# Patient Record
Sex: FEMALE | Race: BLACK OR AFRICAN AMERICAN | NOT HISPANIC OR LATINO | Employment: UNEMPLOYED | URBAN - METROPOLITAN AREA
[De-identification: names, ages, dates, MRNs, and addresses within clinical notes are randomized per-mention and may not be internally consistent; named-entity substitution may affect disease eponyms.]

---

## 2017-01-03 ENCOUNTER — OFFICE VISIT (OUTPATIENT)
Dept: SURGERY | Facility: CLINIC | Age: 52
End: 2017-01-03
Payer: MEDICAID

## 2017-01-03 VITALS
SYSTOLIC BLOOD PRESSURE: 143 MMHG | WEIGHT: 240 LBS | DIASTOLIC BLOOD PRESSURE: 73 MMHG | BODY MASS INDEX: 37.67 KG/M2 | HEART RATE: 99 BPM | TEMPERATURE: 98 F | HEIGHT: 67 IN

## 2017-01-03 DIAGNOSIS — R10.13 EPIGASTRIC PAIN: Primary | ICD-10-CM

## 2017-01-03 PROCEDURE — 99024 POSTOP FOLLOW-UP VISIT: CPT | Mod: ,,, | Performed by: PHYSICIAN ASSISTANT

## 2017-01-03 PROCEDURE — 99999 PR PBB SHADOW E&M-EST. PATIENT-LVL III: CPT | Mod: PBBFAC,,, | Performed by: PHYSICIAN ASSISTANT

## 2017-01-03 PROCEDURE — 99213 OFFICE O/P EST LOW 20 MIN: CPT | Mod: PBBFAC | Performed by: PHYSICIAN ASSISTANT

## 2017-01-03 NOTE — PROGRESS NOTES
Subjective:       Patient ID: Edita Riley is a 51 y.o. female.    Chief Complaint: Post Op Lap Nena    HPI Comments: Ms. Riley is well known to Dr. Goldberg's service, s/p lap nena 11/9/16. She is here today for abdominal pain which has been present off and on since surgery. She states the pain is in the epigastric area and occurs mostly at night. She initially does not recall which medicine she takes for it, but then admits that she takes nexium for the pain and it does relieve her symptoms. She denies vomiting, and constipation. She has diarrhea depending on what she eats. Her abdominal pain is exacerbated by specific foods. Her  gives examples such as juices and tomato sauce. She reports to sleeping on multiple pillows at night. She has been worked up by GI prior to lap nena and was found to have Schatzki ring and hiatal hernia.    Review of Systems   Constitutional: Negative for chills and fever.   HENT: Negative for congestion and rhinorrhea.    Eyes: Negative for photophobia and visual disturbance.   Respiratory: Negative for cough and shortness of breath.    Cardiovascular: Negative for chest pain and palpitations.   Gastrointestinal: Positive for abdominal pain and diarrhea. Negative for nausea and vomiting.   Endocrine: Negative for cold intolerance and heat intolerance.   Musculoskeletal: Negative for arthralgias and myalgias.   Skin: Negative for rash and wound.   Allergic/Immunologic: Negative for immunocompromised state.   Neurological: Negative for tremors and weakness.   Hematological: Negative for adenopathy.   Psychiatric/Behavioral: Negative for agitation.       Objective:      Physical Exam   Constitutional: She is oriented to person, place, and time. She appears well-developed and well-nourished. No distress.   HENT:   Head: Normocephalic and atraumatic.   Eyes: Pupils are equal, round, and reactive to light.   Neck: Normal range of motion. Neck supple.    Cardiovascular: Normal rate and regular rhythm.    Pulmonary/Chest: Effort normal. No respiratory distress.   Abdominal: Soft. She exhibits no distension. There is no tenderness.   Musculoskeletal: Normal range of motion. She exhibits no edema or tenderness.   Neurological: She is alert and oriented to person, place, and time.   Skin: Skin is warm and dry.   Psychiatric: She has a normal mood and affect.       Assessment:   50 yo female s/p lap nena 11/9/16; with abdominal pain  Plan:   Recommend patient to follow back up with GI regarding hiatal and schatzki ring  If surgical intervention is necessary will refer to [facility] or Srir[facility] for hiatal hernia repair

## 2017-01-30 ENCOUNTER — TELEPHONE (OUTPATIENT)
Dept: GASTROENTEROLOGY | Facility: CLINIC | Age: 52
End: 2017-01-30

## 2017-01-31 ENCOUNTER — OFFICE VISIT (OUTPATIENT)
Dept: GASTROENTEROLOGY | Facility: CLINIC | Age: 52
End: 2017-01-31
Payer: MEDICAID

## 2017-01-31 VITALS
HEIGHT: 67 IN | DIASTOLIC BLOOD PRESSURE: 82 MMHG | HEART RATE: 103 BPM | WEIGHT: 234.81 LBS | BODY MASS INDEX: 36.85 KG/M2 | SYSTOLIC BLOOD PRESSURE: 122 MMHG

## 2017-01-31 DIAGNOSIS — R10.13 EPIGASTRIC ABDOMINAL PAIN: Primary | ICD-10-CM

## 2017-01-31 PROCEDURE — 99213 OFFICE O/P EST LOW 20 MIN: CPT | Mod: PBBFAC | Performed by: INTERNAL MEDICINE

## 2017-01-31 PROCEDURE — 99999 PR PBB SHADOW E&M-EST. PATIENT-LVL III: CPT | Mod: PBBFAC,,, | Performed by: INTERNAL MEDICINE

## 2017-01-31 PROCEDURE — 99214 OFFICE O/P EST MOD 30 MIN: CPT | Mod: S$PBB,,, | Performed by: INTERNAL MEDICINE

## 2017-01-31 RX ORDER — SUCRALFATE 1 G/1
1 TABLET ORAL 2 TIMES DAILY PRN
Qty: 60 TABLET | Refills: 1 | Status: SHIPPED | OUTPATIENT
Start: 2017-01-31 | End: 2017-04-07 | Stop reason: SDUPTHER

## 2017-01-31 NOTE — PROGRESS NOTES
REASON FOR VISIT:  Lower chest and epigastric abdominal pain.    HISTORY OF PRESENT ILLNESS:  Ms. Riley is a 52-year-old who was previously   seen by me in September for upper abdominal pain and she subsequently underwent   an upper endoscopy and colonoscopy, which revealed a Schatzki's ring and a   medium-sized hiatal hernia.  Biopsies for H. pylori were negative.  A   colonoscopy was rather unremarkable except for a 3-mm colon polyp that was   completely resected and found to be a hyperplastic polyp.  Subsequent imaging   revealed cholelithiasis without cholecystitis; however, the patient underwent   cholecystectomy because of continued complaints of epigastric pain.  She did   well and currently is complaining of retrosternal lower chest pain radiating   into her epigastric region, which is usually triggered by food, especially   tomato gravy or spicy foods.  She is currently on Nexium daily and we talked   about adding Carafate suspension on an as needed basis, but also we discussed   about checking her labs today to evaluate for liver function tests and lipase.    Denies any alcohol use.    PAST MEDICAL, SURGICAL, SOCIAL AND FAMILY HISTORY:  Reviewed.    MEDICATIONS AND ALLERGIES:  Reviewed.    REVIEW OF SYSTEMS:  CONSTITUTIONAL:  No fever.  No chills.  No malaise.  No fatigue.  EYES:  No visual changes.  ENT:  No odynophagia or hoarseness of voice.  CARDIOVASCULAR:  No angina or palpitations.  RESPIRATORY:  No shortness of breath or wheezing.  GASTROINTESTINAL:  See HPI.  She is also complaining of some occasional   straining with her bowels.  No blood in the stool.    PHYSICAL EXAMINATION:  VITAL SIGNS:  See EPIC.  GENERAL:  Awake, alert and oriented x3, in no acute distress.  NECK:  Supple.  No carotid bruit.  ABDOMEN:  Obese and soft.  Mild tenderness to deep palpation in the epigastrium.    No guarding or rigidity.  Bowel sounds are normal.  No abdominal bruits heard.  EYES:  Conjunctivae are  anicteric.  CARDIOVASCULAR:  S1 and S2 are normal.  RESPIRATORY:  Bilateral air entry equal.  LOWER EXTREMITY:  No pedal edema.    IMPRESSION:  Epigastric abdominal pain -- etiology unclear; however, GERD could   be playing a role.    RECOMMENDATIONS:  1.  Continue Nexium daily.  2.  Add Carafate 1 g suspension p.o. b.i.d. p.r.n.  3.  Check CMP and lipase.  4.  May consider a repeat ultrasound of the right upper quadrant in the future   if the pain continues to evaluate for choledocholithiasis.    A total of 25 minutes were spent with the patient, more than 50% in counseling.      ACT/HN  dd: 01/31/2017 11:14:54 (CST)  td: 02/01/2017 04:15:27 (CST)  Doc ID   #[id number]  Job ID #[id number]    CC:

## 2017-01-31 NOTE — MR AVS SNAPSHOT
[facility]  [address]  [facility] [address]  Phone: [phone]  Fax: [phone]                  Edita Riley   2017 11:00 AM   Office Visit    Description:  Female : 1965   Provider:  Arden Gutiérrez MD   Department:  [facility]           Reason for Visit     Follow-up           Diagnoses this Visit        Comments    Epigastric abdominal pain    -  Primary            To Do List           Future Appointments        Provider Department Dept Phone    2017 11:40 AM LAB, APPOINTMENT [place] [facility] [phone]    3/2/2017 10:00 AM Hammad Lackey MD [facility] [phone]      Goals (5 Years of Data)     None       These Medications        Disp Refills Start End    sucralfate (CARAFATE) 1 gram tablet 60 tablet 1 2017     Take 1 tablet (1 g total) by mouth 2 (two) times daily as needed. - Oral    Pharmacy: [facility]/pharmacy #[id number] - [place], [place] - [address]. Ph #: [phone]         [facility]     [facility] Care [facility] -  Assistance  Registered nurses in the [facility] provide clinical advisement, health education, appointment booking, and other advisory services.  Call for this free service at [phone].             Medications           Message regarding Medications     Verify the changes and/or additions to your medication regime listed below are the same as discussed with your clinician today.  If any of these changes or additions are incorrect, please notify your healthcare provider.        START taking these NEW medications        Refills    sucralfate (CARAFATE) 1 gram tablet 1    Sig: Take 1 tablet (1 g total) by mouth 2 (two) times daily as needed.    Class: Normal    Route: Oral           Verify that the below list of medications is an accurate representation of the medications you are currently taking.  If none reported,  "the list may be blank. If incorrect, please contact your healthcare provider. Carry this list with you in case of emergency.           Current Medications     duloxetine (CYMBALTA) 60 MG capsule Take 1 capsule (60 mg total) by mouth once daily.    esomeprazole (NEXIUM) 40 MG capsule Take 1 capsule (40 mg total) by mouth before breakfast.    estradiol (ESTRACE) 0.01 % (0.1 mg/gram) vaginal cream Place 1 g vaginally once daily.    estradiol (ESTRACE) 1 MG tablet Take 1 tablet (1 mg total) by mouth once daily.    lisinopril (PRINIVIL,ZESTRIL) 5 MG tablet Take 1 tablet (5 mg total) by mouth once daily.    meclizine (ANTIVERT) 25 mg tablet Take 1 tablet (25 mg total) by mouth 3 (three) times daily as needed for Dizziness.    sucralfate (CARAFATE) 1 gram tablet Take 1 tablet (1 g total) by mouth 2 (two) times daily as needed.           Clinical Reference Information           Vital Signs - Last Recorded  Most recent update: 1/31/2017 10:55 AM by Mohan Short MA    BP Pulse Ht Wt BMI    122/82 103 5' 7" (1.702 m) 106.5 kg (234 lb 12.6 oz) 36.77 kg/m2      Blood Pressure          Most Recent Value    BP  122/82      Allergies as of 1/31/2017     Bee Sting [Allergen Ext-venom-honey Bee]    Grass Pollen-bermuda, Standard      Immunizations Administered on Date of Encounter - 1/31/2017     None      Orders Placed During Today's Visit     Future Labs/Procedures Expected by Expires    Comprehensive metabolic panel  1/31/2017 4/1/2018    Lipase  1/31/2017 4/1/2018      [facility] Sign-Up     Activating your [facility] account is as easy as 1-2-3!     1) Visit [email or web address], select Sign Up Now, enter this activation code and your date of birth, then select Next.  [email or web address]  Expires: 3/17/2017 11:11 AM      2) Create a username and password to use when you visit [facility] in the future and select a security question in case you lose your password and select Next.    3) Enter your e-mail address and click Sign " Up!    Additional Information  If you have questions, please e-mail [email or web address] or call [phone] to talk to our [facility] staff. Remember, [facility] is NOT to be used for urgent needs. For medical emergencies, dial 911.

## 2017-02-01 ENCOUNTER — TELEPHONE (OUTPATIENT)
Dept: GASTROENTEROLOGY | Facility: CLINIC | Age: 52
End: 2017-02-01

## 2017-02-01 NOTE — TELEPHONE ENCOUNTER
----- Message from Arden Gutiérrez MD sent at 2/1/2017  8:23 AM CST -----  Labs are stable. Follow up in 6 weeks.

## 2017-02-07 DIAGNOSIS — R23.2 HOT FLASHES: ICD-10-CM

## 2017-02-07 RX ORDER — ESTRADIOL 1 MG/1
TABLET ORAL
Qty: 30 TABLET | Refills: 2 | Status: SHIPPED | OUTPATIENT
Start: 2017-02-07 | End: 2017-04-06 | Stop reason: SDUPTHER

## 2017-03-01 DIAGNOSIS — I10 ESSENTIAL HYPERTENSION: ICD-10-CM

## 2017-03-02 NOTE — TELEPHONE ENCOUNTER
Refill requested of omeprazole but pt's med list has omeprazole. Please clarify which she is taking. Thanks!

## 2017-03-02 NOTE — TELEPHONE ENCOUNTER
[place], Refill requested of omeprazole but pt's med list has esomeprazole. Please clarify which she is taking. Thanks!

## 2017-03-03 RX ORDER — OMEPRAZOLE 40 MG/1
CAPSULE, DELAYED RELEASE ORAL
Qty: 30 CAPSULE | Refills: 0 | Status: SHIPPED | OUTPATIENT
Start: 2017-03-03 | End: 2017-03-30 | Stop reason: SDUPTHER

## 2017-03-03 RX ORDER — LISINOPRIL 5 MG/1
TABLET ORAL
Qty: 30 TABLET | Refills: 0 | Status: SHIPPED | OUTPATIENT
Start: 2017-03-03 | End: 2017-03-30 | Stop reason: SDUPTHER

## 2017-03-03 NOTE — TELEPHONE ENCOUNTER
Refill requested of omeprazole but pt's med list has esomeprazole. Please clarify which she is taking. Thanks!

## 2017-03-28 ENCOUNTER — OFFICE VISIT (OUTPATIENT)
Dept: INTERNAL MEDICINE | Facility: CLINIC | Age: 52
End: 2017-03-28
Payer: MEDICAID

## 2017-03-28 ENCOUNTER — OFFICE VISIT (OUTPATIENT)
Dept: GASTROENTEROLOGY | Facility: CLINIC | Age: 52
End: 2017-03-28
Payer: MEDICAID

## 2017-03-28 VITALS
SYSTOLIC BLOOD PRESSURE: 130 MMHG | WEIGHT: 238.31 LBS | HEIGHT: 67 IN | DIASTOLIC BLOOD PRESSURE: 84 MMHG | BODY MASS INDEX: 37.4 KG/M2 | HEART RATE: 78 BPM

## 2017-03-28 VITALS
WEIGHT: 238.13 LBS | BODY MASS INDEX: 37.37 KG/M2 | DIASTOLIC BLOOD PRESSURE: 80 MMHG | SYSTOLIC BLOOD PRESSURE: 137 MMHG | HEART RATE: 91 BPM | HEIGHT: 67 IN

## 2017-03-28 DIAGNOSIS — I10 ESSENTIAL HYPERTENSION: ICD-10-CM

## 2017-03-28 DIAGNOSIS — M79.7 FIBROMYALGIA: ICD-10-CM

## 2017-03-28 DIAGNOSIS — R23.2 HOT FLASHES: ICD-10-CM

## 2017-03-28 DIAGNOSIS — Z23 NEED FOR TDAP VACCINATION: ICD-10-CM

## 2017-03-28 DIAGNOSIS — G47.9 SLEEP TROUBLE: ICD-10-CM

## 2017-03-28 DIAGNOSIS — K21.9 GASTROESOPHAGEAL REFLUX DISEASE, ESOPHAGITIS PRESENCE NOT SPECIFIED: Primary | ICD-10-CM

## 2017-03-28 DIAGNOSIS — K44.9 HIATAL HERNIA: ICD-10-CM

## 2017-03-28 DIAGNOSIS — Z00.00 ANNUAL PHYSICAL EXAM: Primary | ICD-10-CM

## 2017-03-28 DIAGNOSIS — R42 VERTIGO: ICD-10-CM

## 2017-03-28 PROCEDURE — 99213 OFFICE O/P EST LOW 20 MIN: CPT | Mod: S$PBB,,, | Performed by: INTERNAL MEDICINE

## 2017-03-28 PROCEDURE — 99213 OFFICE O/P EST LOW 20 MIN: CPT | Mod: PBBFAC | Performed by: INTERNAL MEDICINE

## 2017-03-28 PROCEDURE — 99999 PR PBB SHADOW E&M-EST. PATIENT-LVL III: CPT | Mod: PBBFAC,,, | Performed by: INTERNAL MEDICINE

## 2017-03-28 PROCEDURE — 99396 PREV VISIT EST AGE 40-64: CPT | Mod: S$PBB,,, | Performed by: INTERNAL MEDICINE

## 2017-03-28 RX ORDER — CYCLOBENZAPRINE HCL 10 MG
TABLET ORAL
Refills: 1 | COMMUNITY
Start: 2017-03-23 | End: 2020-03-12

## 2017-03-28 RX ORDER — MECLIZINE HYDROCHLORIDE 25 MG/1
25 TABLET ORAL 3 TIMES DAILY PRN
Qty: 30 TABLET | Refills: 6 | Status: SHIPPED | OUTPATIENT
Start: 2017-03-28 | End: 2017-11-16 | Stop reason: SDUPTHER

## 2017-03-28 NOTE — PROGRESS NOTES
Subjective:       Patient ID: Edita Riley is a 52 y.o. female.    Chief Complaint: Follow-up    HPI    Last visit with me 11/2016. Referred to [facility] at that time but no visits made. Seen by Gastroenterology and General Surgery, had cholecystectomy. Has appointment with Gastroenterology today.     Reports feeling better since cholecystectomy but still with occasionally abdominal pain, thinks due to hiatal hernia. Taking blood pressure meds, sees Gastroenterology later today. Going to be seeing Neurology for dropping things out of her hand. Reports legs and neck are also causing problems. Has arthralgias in legs, knees feel stiff, reports osteoarthritis. Reports whole leg hurts.    Never had sleep study. Reports when getting up in morning has bad sweats.    Reviewed PMH, PSH, SH, FH, allergies, and medications.     Review of Systems   All other systems reviewed and are negative.      Objective:      Physical Exam   Constitutional: She is oriented to person, place, and time. No distress.   -American woman whose Body mass index is 37.33 kg/(m^2).    HENT:   Head: Atraumatic.   Right Ear: Tympanic membrane normal.   Left Ear: Tympanic membrane normal.   Mouth/Throat: Oropharynx is clear and moist. No oropharyngeal exudate.   Eyes: Pupils are equal, round, and reactive to light. Right eye exhibits no discharge. Left eye exhibits no discharge.   Neck: Normal range of motion. No thyromegaly present.   Cardiovascular: Normal rate, regular rhythm and normal heart sounds.    Pulmonary/Chest: Effort normal and breath sounds normal. No stridor. She has no wheezes. She has no rales.   Abdominal: Soft. She exhibits no distension and no mass. There is no hepatosplenomegaly. There is no tenderness. There is no guarding and negative Kendrick's sign.   Musculoskeletal: She exhibits tenderness (to deep palpation throughout b/L leg muscles). She exhibits no edema.   Lymphadenopathy:     She has no cervical adenopathy.  "  Neurological: She is alert and oriented to person, place, and time.   left sided facial palsy   Skin: Skin is warm and dry. No rash noted.   Psychiatric: She has a normal mood and affect. Her behavior is normal.   Nursing note and vitals reviewed.      Vitals:    03/28/17 1056 03/28/17 1123   BP: (!) 144/88 130/84   BP Location: Right arm Right arm   Patient Position: Sitting Sitting   BP Method: Manual Manual   Pulse: 78    Weight: 108.1 kg (238 lb 5.1 oz)    Height: 5' 7" (1.702 m)      Body mass index is 37.33 kg/(m^2).    RESULTS: Reviewed labs from last 3 months    I have personally checked the blood pressure and documented my findings.     Assessment:       1. Annual physical exam    2. Vertigo    3. Need for Tdap vaccination    4. Hiatal hernia    5. Essential hypertension    6. Fibromyalgia    7. Sleep trouble    8. Hot flashes        Plan:   Edita was seen today for follow-up.    Diagnoses and all orders for this visit:    Annual physical exam:  Age-appropriate health screening reviewed, indicated tests ordered.     Vertigo:  Prior diagnosis, well controlled on current management, uses Antivert PRN. No changes at this time, will continue to monitor.   -     meclizine (ANTIVERT) 25 mg tablet; Take 1 tablet (25 mg total) by mouth 3 (three) times daily as needed for Dizziness.    Need for Tdap vaccination  -     Tdap Vaccine    Hiatal hernia:  Continue follow up with Gastroenterology for evaluation and management.    Essential hypertension:  Prior diagnosis, well controlled on current management. No changes at this time, will continue to monitor.     Fibromyalgia:  Continue duloxetine and stretching exercise, refer to sleep clinic.  -     Ambulatory consult for Sleep Study    Sleep trouble:  Reports having trouble with sleeping, given obesity, HTN, and fibromyalgia refer to Sleep Clinic.  -     Ambulatory consult for Sleep Study    Hot flashes:  Mostly occurring at night, had BSO 2 yrs ago, takes estrogen " daily. No weight loss or other symptoms to suggest malignancy or chronic infection causing sweats.    Return in about 6 months (around 9/28/2017).  Hammad Lackey MD  Internal Medicine    Portions of this note were completed using [employer]oon[employer] dictation software. Please excuse typographical or syntax errors.

## 2017-03-28 NOTE — PROGRESS NOTES
REASON FOR VISIT:  Epigastric abdominal pain.    HISTORY OF PRESENT ILLNESS:  Ms. Riley was last seen in January with lower   chest and epigastric pain.  She was already on Nexium and had previously   undergone an upper endoscopy where there was evidence of mild Schatzki's ring   and was dilated up to 20 mm.  Biopsies for H. pylori were negative.  She   subsequently underwent cholecystectomy with evidence of chronic cholecystitis   and cholelithiasis.  She was on Carafate suspension during the last clinic visit   and she has been taking it on an as needed basis with good relief of her   symptoms.  We had checked her CMP and lipase, which were unremarkable.  Today,   she returns with no new complaints.  Overall, she is feeling much better.  We   discussed about continuing Nexium daily and using Carafate on a p.r.n. basis.    She will call back and set up a clinic appointment sooner than a year if she has   worsening of her symptoms.    PAST MEDICAL, SURGICAL, SOCIAL AND FAMILY HISTORY:  Reviewed.    MEDICATIONS AND ALLERGIES:  Reviewed.    REVIEW OF SYSTEMS:  CONSTITUTIONAL:  No fever.  No chills.  No unintentional weight loss.  Appetite   is stable.  EYES:  No visual changes.  ENT:  No odynophagia or hoarseness of voice.  CARDIOVASCULAR:  No angina or palpitations.  RESPIRATORY:  No shortness of breath or wheezing.  GASTROINTESTINAL:  See HPI.    PHYSICAL EXAMINATION:  VITAL SIGNS:  See EPIC.  GENERAL:  Awake, alert and oriented x3, in no acute distress.  No physical exam was done today.  About 15 minutes were spent with the patient,   more than 50% in counseling regarding taking her Nexium daily and using Carafate   only on an as needed basis.    IMPRESSION:  GERD, symptoms well controlled.  No recent bout of epigastric   abdominal pain.    RECOMMENDATIONS:  1.  Continue Nexium daily and use Carafate suspension p.r.n.  2.  Follow up in GI Clinic in a year or sooner with any complaints.      ACT/HN  dd:  03/28/2017 16:25:51 (CDT)  td: 03/29/2017 15:46:10 (CDT)  Doc ID   #[id number]  Job ID #[id number]    CC:

## 2017-03-28 NOTE — MR AVS SNAPSHOT
[facility]  [address]  [facility] [address]  Phone: [phone]  Fax: [phone]                  Edita Riley   3/28/2017 11:00 AM   Office Visit    Description:  Female : 1965   Provider:  Hammad Lackey MD   Department:  [facility]           Reason for Visit     Follow-up           Diagnoses this Visit        Comments    Annual physical exam    -  Primary     Vertigo         Need for Tdap vaccination         Hiatal hernia         Essential hypertension         Fibromyalgia         Sleep trouble         Hot flashes                To Do List           Future Appointments        Provider Department Dept Phone    3/28/2017 4:00 PM Arden Gutiérrez MD [facility] - Gastroenterology [phone]      Goals (5 Years of Data)     None      Follow-Up and Disposition     Return in about 6 months (around 2017).       These Medications        Disp Refills Start End    meclizine (ANTIVERT) 25 mg tablet 30 tablet 6 3/28/2017     Take 1 tablet (25 mg total) by mouth 3 (three) times daily as needed for Dizziness. - Oral    Pharmacy: [facility] #[id number] - [place], [place] - [address].  #: [phone]         [facility]     [facility] On Call Nurse Care Line -  Assistance  Registered nurses in the [facility] On Call Center provide clinical advisement, health education, appointment booking, and other advisory services.  Call for this free service at [phone].             Medications           Message regarding Medications     Verify the changes and/or additions to your medication regime listed below are the same as discussed with your clinician today.  If any of these changes or additions are incorrect, please notify your healthcare provider.             Verify that the below list of medications is an accurate representation of the medications you are currently taking.  If none reported, the list may be blank. If incorrect,  "please contact your healthcare provider. Carry this list with you in case of emergency.           Current Medications     cyclobenzaprine (FLEXERIL) 10 MG tablet TAKE 1 TABLET (10 MG TOTAL) BY MOUTH NIGHTLY AS NEEDED FOR MUSCLE SPASMS.    duloxetine (CYMBALTA) 60 MG capsule Take 1 capsule (60 mg total) by mouth once daily.    estradiol (ESTRACE) 0.01 % (0.1 mg/gram) vaginal cream Place 1 g vaginally once daily.    estradiol (ESTRACE) 1 MG tablet TAKE ONE TABLET BY MOUTH DAILY    lisinopril (PRINIVIL,ZESTRIL) 5 MG tablet TAKE ONE TABLET BY MOUTH EVERY DAY FOR BLOOD PRESSURE    meclizine (ANTIVERT) 25 mg tablet Take 1 tablet (25 mg total) by mouth 3 (three) times daily as needed for Dizziness.    omeprazole (PRILOSEC) 40 MG capsule TAKE ONE CAPSULE BY MOUTH DAILY FOR STOMACH    sucralfate (CARAFATE) 1 gram tablet Take 1 tablet (1 g total) by mouth 2 (two) times daily as needed.           Clinical Reference Information           Your Vitals Were     BP Pulse Height Weight BMI    130/84 (BP Location: Right arm, Patient Position: Sitting, BP Method: Manual) 78 5' 7" (1.702 m) 108.1 kg (238 lb 5.1 oz) 37.33 kg/m2      Blood Pressure          Most Recent Value    BP  130/84      Allergies as of 3/28/2017     Bee Sting [Allergen Ext-venom-honey Bee]    Grass Pollen-bermuda, Standard      Immunizations Administered on Date of Encounter - 3/28/2017     Name Date Dose VIS Date Route    TDAP 3/28/2017 0.5 mL 2/24/2015 Intramuscular      Orders Placed During Today's Visit      Normal Orders This Visit    Ambulatory consult for Sleep Study     Tdap Vaccine       [facility] Sign-Up     Activating your [facility] account is as easy as 1-2-3!     1) Visit [email or web address], select Sign Up Now, enter this activation code and your date of birth, then select Next.  [email or web address]  Expires: 5/12/2017 11:46 AM      2) Create a username and password to use when you visit [facility] in the future and select a security question in case you " lose your password and select Next.    3) Enter your e-mail address and click Sign Up!    Additional Information  If you have questions, please e-mail [email or web address] or call [phone] to talk to our [facility] staff. Remember, [facility] is NOT to be used for urgent needs. For medical emergencies, dial 911.         Language Assistance Services     ATTENTION: Language assistance services are available, free of charge. Please call [phone].      ATENCIÓN: Si habla español, tiene a ocasio disposición servicios gratuitos de asistencia lingüística. Llame al [phone].     CHÚ Ý: N?u b?n nói Ti?ng Vi?t, có các d?ch v? h? tr? ngôn ng? mi?n phí dành cho b?n. G?i s? [phone].         [facility] complies with applicable Federal civil rights laws and does not discriminate on the basis of race, color, national origin, age, disability, or sex.

## 2017-03-28 NOTE — MR AVS SNAPSHOT
[facility]  [address]  [facility] [address]  Phone: [phone]  Fax: [phone]                  Edita Riley   3/28/2017 4:00 PM   Office Visit    Description:  Female : 1965   Provider:  Arden Gutiérrez MD   Department:  [facility]           Reason for Visit     Follow-up                To Do List           Future Appointments        Provider Department Dept Phone    2017 9:20 AM Marybeth Beaulieu MD [facility] [place], [facility] [phone]    2017 4:20 PM Refugio Benjamin MD [facility] [phone]      Goals (5 Years of Data)     None      [facility]     [facility] Line -  Assistance  Registered nurses in the [facility] provide clinical advisement, health education, appointment booking, and other advisory services.  Call for this free service at [phone].             Medications           Message regarding Medications     Verify the changes and/or additions to your medication regime listed below are the same as discussed with your clinician today.  If any of these changes or additions are incorrect, please notify your healthcare provider.             Verify that the below list of medications is an accurate representation of the medications you are currently taking.  If none reported, the list may be blank. If incorrect, please contact your healthcare provider. Carry this list with you in case of emergency.           Current Medications     cyclobenzaprine (FLEXERIL) 10 MG tablet TAKE 1 TABLET (10 MG TOTAL) BY MOUTH NIGHTLY AS NEEDED FOR MUSCLE SPASMS.    duloxetine (CYMBALTA) 60 MG capsule Take 1 capsule (60 mg total) by mouth once daily.    estradiol (ESTRACE) 0.01 % (0.1 mg/gram) vaginal cream Place 1 g vaginally once daily.    estradiol (ESTRACE) 1 MG tablet TAKE ONE TABLET BY MOUTH DAILY    lisinopril (PRINIVIL,ZESTRIL) 5 MG tablet TAKE ONE TABLET BY MOUTH  "EVERY DAY FOR BLOOD PRESSURE    meclizine (ANTIVERT) 25 mg tablet Take 1 tablet (25 mg total) by mouth 3 (three) times daily as needed for Dizziness.    omeprazole (PRILOSEC) 40 MG capsule TAKE ONE CAPSULE BY MOUTH DAILY FOR STOMACH    sucralfate (CARAFATE) 1 gram tablet Take 1 tablet (1 g total) by mouth 2 (two) times daily as needed.           Clinical Reference Information           Your Vitals Were     BP Pulse Height Weight BMI    137/80 91 5' 7" (1.702 m) 108 kg (238 lb 1.6 oz) 37.29 kg/m2      Blood Pressure          Most Recent Value    BP  137/80      Allergies as of 3/28/2017     Bee Sting [Allergen Ext-venom-honey Bee]    Grass Pollen-bermuda, Standard      Immunizations Administered on Date of Encounter - 3/28/2017     Name Date Dose VIS Date Route    TDAP 3/28/2017 0.5 mL 2/24/2015 Intramuscular      [facility] Sign-Up     Activating your [facility] account is as easy as 1-2-3!     1) Visit [email or web address], select Sign Up Now, enter this activation code and your date of birth, then select Next.  [email or web address]  Expires: 5/12/2017 11:46 AM      2) Create a username and password to use when you visit [facility] in the future and select a security question in case you lose your password and select Next.    3) Enter your e-mail address and click Sign Up!    Additional Information  If you have questions, please e-mail [email or web address] or call [phone] to talk to our [facility] staff. Remember, [facility] is NOT to be used for urgent needs. For medical emergencies, dial 911.         Language Assistance Services     ATTENTION: Language assistance services are available, free of charge. Please call [phone].      ATENCIÓN: Si habla johnyañol, tiene a ocasio disposición servicios gratuitos de asistencia lingüística. Llame al [phone].     CHÚ Ý: N?u b?n nói Ti?ng Vi?t, có các d?ch v? h? tr? ngôn ng? mi?n phí dành cho b?n. G?i s? [phone].         [facility] complies with " applicable Federal civil rights laws and does not discriminate on the basis of race, color, national origin, age, disability, or sex.

## 2017-03-30 DIAGNOSIS — I10 ESSENTIAL HYPERTENSION: ICD-10-CM

## 2017-03-30 RX ORDER — OMEPRAZOLE 40 MG/1
CAPSULE, DELAYED RELEASE ORAL
Qty: 30 CAPSULE | Refills: 11 | Status: SHIPPED | OUTPATIENT
Start: 2017-03-30 | End: 2017-11-16 | Stop reason: SDUPTHER

## 2017-03-30 RX ORDER — LISINOPRIL 5 MG/1
TABLET ORAL
Qty: 30 TABLET | Refills: 11 | Status: SHIPPED | OUTPATIENT
Start: 2017-03-30 | End: 2017-07-20 | Stop reason: DRUGHIGH

## 2017-04-06 DIAGNOSIS — R23.2 HOT FLASHES: ICD-10-CM

## 2017-04-06 RX ORDER — ESTRADIOL 1 MG/1
TABLET ORAL
Qty: 30 TABLET | Refills: 11 | Status: SHIPPED | OUTPATIENT
Start: 2017-04-06 | End: 2017-07-11

## 2017-04-07 RX ORDER — SUCRALFATE 1 G/1
TABLET ORAL
Qty: 60 TABLET | Refills: 1 | Status: SHIPPED | OUTPATIENT
Start: 2017-04-07 | End: 2017-06-02 | Stop reason: SDUPTHER

## 2017-05-18 ENCOUNTER — OFFICE VISIT (OUTPATIENT)
Dept: SLEEP MEDICINE | Facility: CLINIC | Age: 52
End: 2017-05-18
Payer: MEDICAID

## 2017-05-18 VITALS
HEART RATE: 96 BPM | BODY MASS INDEX: 37.89 KG/M2 | WEIGHT: 241.38 LBS | HEIGHT: 67 IN | SYSTOLIC BLOOD PRESSURE: 130 MMHG | DIASTOLIC BLOOD PRESSURE: 79 MMHG

## 2017-05-18 DIAGNOSIS — G47.30 SLEEP APNEA, UNSPECIFIED TYPE: Primary | ICD-10-CM

## 2017-05-18 PROCEDURE — 99999 PR PBB SHADOW E&M-EST. PATIENT-LVL III: CPT | Mod: PBBFAC,,, | Performed by: PSYCHIATRY & NEUROLOGY

## 2017-05-18 PROCEDURE — 99213 OFFICE O/P EST LOW 20 MIN: CPT | Mod: PBBFAC | Performed by: PSYCHIATRY & NEUROLOGY

## 2017-05-18 PROCEDURE — 99204 OFFICE O/P NEW MOD 45 MIN: CPT | Mod: S$PBB,,, | Performed by: PSYCHIATRY & NEUROLOGY

## 2017-05-18 NOTE — PATIENT INSTRUCTIONS
SLEEP LAB ([facility] or [facility]) will contact you to schedulethe sleep study. Their number is [phone] (ext 1). Please call them if you do not hear from them in 10 business days from now.  The [facility] is located on 7th floor of the [facility]; [place] [facility] is located in [facility].    SLEEP CLINIC (my assistant) will call you when the sleep study results are ready - if you have not heard from us by 2 weeks from the date of the study, please call [phone] (ext 2) or you can use [facility] to contact me.    You are advised to abstain from driving should you feel sleepy or drowsy.

## 2017-05-18 NOTE — PROGRESS NOTES
Edita Riley  was seen at the request of  Hammad Lackey MD for sleep evaluation.    05/18/2017 INITIAL HISTORY OF PRESENT ILLNESS:  Edita Riley is a 52 y.o. female is here to be evaluated for a sleep disorder.       CHIEF COMPLAINT:      The patient's complaints include excessive daytime sleepiness, excessive daytime fatigue, snoring,  witnessed breathing pauses,  gasping for air in sleep and interrupted sleep since 3 years ago.    Lifting head of the bed helped some - but still tired    Gained some weight prior the onset of her symptoms.    Reports  dry mouth and sore throat  Reports nasal congestion   Reports occasional  morning headaches  Reports  interrupted sleep  Denies frequent leg movements; but reports leg pain  Denies symptoms concerning for parasomnia    The ESS (Providence Sleepiness Score) taken on initial visit is 21 /24    The patient never had tonsillectomy, adenoidectomy or UPPP     At times feels lightheaded and week in her legs/drowsy when walking      SLEEP ROUTINE AND LIFESTYLE 05/18/2017 :    Occupation:    Bed partner:      Time to bed - wake up time on a workday : 10 to 9  Time to bed - wake up time on a day off: 12 to  10  Sleep onset latency: 1 hour  Disruptions or awakenings: 2-3  Time to fall back into sleep: 15 min   Perceived sleep quality: 3/5  Perceived total sleep time:  Sometimes 6 hrs  hours.  Daytime naps: 3    Exercise routine: no  Caffeine:  Coffee   Sometimes - 1 cup a day and later to stay awake     PREVIOUS SLEEP STUDIES:   NOne      DME:       PAST MEDICAL HISTORY:    Active Ambulatory Problems     Diagnosis Date Noted    Osteoarthritis of back 05/04/2015    Hiatal hernia 05/04/2015    Essential hypertension 05/04/2015    Lower extremity pain, diffuse 07/24/2015    Weakness of both lower extremities 07/24/2015    Impaired functional mobility, balance, gait, and endurance 07/24/2015    Schatzki's ring 09/14/2015    Colon polyp 09/14/2015     Internal hemorrhoids 09/14/2015    Cardiovascular event risk -- low 09/14/2015    Hemifacial spasm 09/16/2015    Depression 09/16/2015    Fibromyalgia 09/16/2015    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    Blood in stool 10/24/2016     Resolved Ambulatory Problems     Diagnosis Date Noted    Cervical cancer 05/04/2015    Biliary colic 11/09/2016     Past Medical History:   Diagnosis Date    Cervical cancer 2014    GERD (gastroesophageal reflux disease)     Hiatal hernia 2014    Hypertension     Lactose intolerance     Osteoarthritis of back     Stroke 1972                PAST SURGICAL HISTORY:    Past Surgical History:   Procedure Laterality Date    BILATERAL OOPHORECTOMY  2015    CHOLECYSTECTOMY  11/09/2016    [facility], path showed chronic cholecystitis and gallstones    COLONOSCOPY  2014    COLONOSCOPY N/A 10/24/2016    at [facility], Dr Thomas    ESOPHAGOGASTRODUODENOSCOPY  2014    HYSTERECTOMY  2015         FAMILY HISTORY:                Family History   Problem Relation Age of Onset    Heart disease Sister     Heart disease Maternal Grandmother     Colon cancer Father     Esophageal cancer Father     Cancer Father      Lung    Cancer Mother      Cervical    Rectal cancer Neg Hx     Stomach cancer Neg Hx     Crohn's disease Neg Hx     Ulcerative colitis Neg Hx        SOCIAL HISTORY:          Tobacco:   History   Smoking Status    Never Smoker   Smokeless Tobacco    Never Used       alcohol use:    History   Alcohol Use No                   ALLERGIES:    Review of patient's allergies indicates:   Allergen Reactions    Bee sting [allergen ext-venom-honey bee]      Rash      Grass pollen-bermuda, standard      rash       CURRENT MEDICATIONS:    Current Outpatient Prescriptions   Medication Sig Dispense Refill    cyclobenzaprine (FLEXERIL) 10 MG tablet TAKE 1 TABLET (10 MG TOTAL) BY MOUTH NIGHTLY AS NEEDED FOR MUSCLE SPASMS.  1    estradiol (ESTRACE) 1 MG tablet TAKE ONE TABLET BY  "MOUTH DAILY 30 tablet 11    lisinopril (PRINIVIL,ZESTRIL) 5 MG tablet TAKE ONE TABLET BY MOUTH EVERY DAY FOR BLOOD PRESSURE 30 tablet 11    meclizine (ANTIVERT) 25 mg tablet Take 1 tablet (25 mg total) by mouth 3 (three) times daily as needed for Dizziness. 30 tablet 6    omeprazole (PRILOSEC) 40 MG capsule TAKE ONE CAPSULE BY MOUTH DAILY FOR STOMACH 30 capsule 11    sucralfate (CARAFATE) 1 gram tablet TAKE 1 TABLET (1 G TOTAL) BY MOUTH 2 (TWO) TIMES DAILY AS NEEDED. 60 tablet 1    duloxetine (CYMBALTA) 60 MG capsule Take 1 capsule (60 mg total) by mouth once daily. 90 capsule 3     No current facility-administered medications for this visit.                       REVIEW OF SYSTEMS:   Sleep related symptoms as per HPI    reports weight gain 20 lbs up and down  Reports dyspnea  Reports occasional palpitations  Reports acid reflux - hiatal hernia  Reports polyuria  Reports  mood diturbance  Denies  anemia  Reports  muscle pain  Denies  Gait imbalance    Otherwise, a balance of 10 systems reviewed is negative.    PHYSICAL EXAM:  /79 (BP Location: Left arm, Patient Position: Sitting, BP Method: Automatic)  Pulse 96  Ht 5' 7" (1.702 m)  Wt 109.5 kg (241 lb 6.5 oz)  BMI 37.81 kg/m2  GENERAL: Overweight body habitus, well groomed.  HEENT:   HEENT:  Conjunctivae are non-erythematous; Pupils equal, round, and reactive to light; Nose is symmetrical; Nasal mucosa is pink and moist; Septum is midline; Inferior turbinates are normal; Nasal airflow is diminshed; Posterior pharynx is pink; Modified Mallampati:IV; Posterior palate is low; Tonsils not visualized; Uvula is wide and elongated;Tongue is enlarged; Dentition is fair; No TMJ tenderness; Jaw opening and protrusion without click and without discomfort.  NECK: Supple. Neck circumference is 16 inches. No thyromegaly. No palpable nodes.     SKIN: On face and neck: No abrasions, no rashes, no lesions.  No subcutaneous nodules are palpable.  RESPIRATORY: Chest is " "clear to auscultation.  Normal chest expansion and non-labored breathing at rest.  CARDIOVASCULAR: Normal S1, S2.  No murmurs, gallops or rubs. No carotid bruits bilaterally.  No edema. No clubbing. No cyanosis.    NEURO: Oriented to time, place and person. Normal attention span and concentration. Gait normal.    PSYCH: Affect is full. Mood is normal  MUSCULOSKELETAL: Moves 4 extremities. Gait normal.         Using [facility]: No      ASSESSMENT:    1. JENN (obstructive sleep apnea). The patient symptomatically has  excessive daytime sleepiness, snoring,  witnessed breathing pauses, excessive daytime fatigue, gasping for air in sleep and interrupted sleep  with exam findings of "a crowded oral airway and elevated body mass index. The patient has medical co-morbidities of hypertension, fibromyalgia and depression which can be worsened by JENN. This warrants further investigation for possible obstructive sleep apnea.    2. Lightheadedness, at times her legs would give away - I can not explain that by sleep apnea - related sleepiness; cataplexy is in the differential, but it is unusual to develop narcolepsy at this age.  Cardiovascular work up to be considered.       PLAN:    Diagnostic: Polysomnogram in lab. The nature of this procedure and its indication was discussed with the patient. she would  like to come discuss PSG results.    Weight loss strategies were discussed in detail     PSG in lab testing       More than 25 minutes of this 45 minutes visit was spent in counseling: during our discussion today, we talked about the etiology of  JENN as well as the potential ramifications of untreated sleep apnea, which could include daytime sleepiness, hypertension, heart disease and/or stroke.  We discussed potential treatment options, which could include weight loss, body positioning, continuous positive airway pressure (CPAP), or referral for surgical consideration. Meanwhile, she  is urged to avoid supine sleep, weight " gain and alcoholic beverages since all of these can worsen JENN.     Precautions: The patient was advised to abstain from driving should he feel sleepy or drowsy.    Follow up: MD/NP  after the sleep study has been completed.     Thank you for allowing me the opportunity to participate in the care of your patient.    This visit summary will be sent to referring provider via [employer]

## 2017-05-18 NOTE — LETTER
May 18, 2017      Hammad Lackey MD  [address]  [facility] [address]           [facility]  [address]  [facility] [address]  Phone: [phone]          Patient: Edita Riley   MR Number: [id number]   YOB: 1965   Date of Visit: 5/18/2017       Dear Dr. Hammad Lackey:    Thank you for referring Edita Riley to me for evaluation. Attached you will find relevant portions of my assessment and plan of care.    If you have questions, please do not hesitate to call me. I look forward to following Edita Riley along with you.    Sincerely,    Marybeth Beaulieu MD    Enclosure  CC:  No Recipients    If you would like to receive this communication electronically, please contact [email or web address] or [phone] to request more information on [employer] access.    For providers and/or their staff who would like to refer a patient to [facility], please contact us through our one-stop-shop provider referral line, [facility], at [phone].    If you feel you have received this communication in error or would no longer like to receive these types of communications, please e-mail [email or web address]

## 2017-05-18 NOTE — MR AVS SNAPSHOT
[facility]  [address]  [facility] [address]  Phone: [phone]                  Edita Riley   2017 9:20 AM   Office Visit    Description:  Female : 1965   Provider:  Marybeth Beaulieu MD   Department:  [facility]           Reason for Visit     Fatigue     Sleep Apnea           Diagnoses this Visit        Comments    Sleep apnea, unspecified type    -  Primary            To Do List           Future Appointments        Provider Department Dept Phone    2017 4:20 PM Refugio Benjamin MD [facility] [phone]      Goals (5 Years of Data)     None      [facility]     [facility] On Call Nurse Care Line -  Assistance  Unless otherwise directed by your provider, please contact [facility], our nurse care line that is available for  assistance.     Registered nurses in the [facility] Center provide: appointment scheduling, clinical advisement, health education, and other advisory services.  Call: [phone] (toll free)               Medications           Message regarding Medications     Verify the changes and/or additions to your medication regime listed below are the same as discussed with your clinician today.  If any of these changes or additions are incorrect, please notify your healthcare provider.             Verify that the below list of medications is an accurate representation of the medications you are currently taking.  If none reported, the list may be blank. If incorrect, please contact your healthcare provider. Carry this list with you in case of emergency.           Current Medications     cyclobenzaprine (FLEXERIL) 10 MG tablet TAKE 1 TABLET (10 MG TOTAL) BY MOUTH NIGHTLY AS NEEDED FOR MUSCLE SPASMS.    estradiol (ESTRACE) 1 MG tablet TAKE ONE TABLET BY MOUTH DAILY    lisinopril (PRINIVIL,ZESTRIL) 5 MG tablet TAKE ONE TABLET BY MOUTH EVERY DAY FOR BLOOD PRESSURE    meclizine  "(ANTIVERT) 25 mg tablet Take 1 tablet (25 mg total) by mouth 3 (three) times daily as needed for Dizziness.    omeprazole (PRILOSEC) 40 MG capsule TAKE ONE CAPSULE BY MOUTH DAILY FOR STOMACH    sucralfate (CARAFATE) 1 gram tablet TAKE 1 TABLET (1 G TOTAL) BY MOUTH 2 (TWO) TIMES DAILY AS NEEDED.    duloxetine (CYMBALTA) 60 MG capsule Take 1 capsule (60 mg total) by mouth once daily.           Clinical Reference Information           Your Vitals Were     BP Pulse Height Weight BMI    130/79 (BP Location: Left arm, Patient Position: Sitting, BP Method: Automatic) 96 5' 7" (1.702 m) 109.5 kg (241 lb 6.5 oz) 37.81 kg/m2      Blood Pressure          Most Recent Value    BP  130/79      Allergies as of 5/18/2017     Bee Sting [Allergen Ext-venom-honey Bee]    Grass Pollen-bermuda, Standard      Immunizations Administered on Date of Encounter - 5/18/2017     None      Orders Placed During Today's Visit     Future Labs/Procedures Expected by Expires    Polysomnogram (CPAP will be added if patient meets diagnostic criteria.)  As directed 5/18/2018      [facility] Sign-Up     Activating your [facility] account is as easy as 1-2-3!     1) Visit [email or web address], select Sign Up Now, enter this activation code and your date of birth, then select Next.  [email or web address]  Expires: 7/2/2017 10:40 AM      2) Create a username and password to use when you visit [facility] in the future and select a security question in case you lose your password and select Next.    3) Enter your e-mail address and click Sign Up!    Additional Information  If you have questions, please e-mail [email or web address] or call [phone] to talk to our [facility] staff. Remember, [facility] is NOT to be used for urgent needs. For medical emergencies, dial 911.         [facility]    SLEEP LAB ([facility] Good) will contact you to schedulethe sleep study. Their number is [phone] (ext 1). Please call them if you do not hear from them in 10 business days from " now.  The [facility] is located on 7th floor of the [facility]; [place] lab is located in [facility].    SLEEP CLINIC (my assistant) will call you when the sleep study results are ready - if you have not heard from us by 2 weeks from the date of the study, please call [phone] (ext 2) or you can use [facility] to contact me.    You are advised to abstain from driving should you feel sleepy or drowsy.         Language Assistance Services     ATTENTION: Language assistance services are available, free of charge. Please call [phone].      ATENCIÓN: Si habla español, tiene a ocasio disposición servicios gratuitos de asistencia lingüística. Llame al [phone].     CHÚ Ý: N?u b?n nói Ti?ng Vi?t, có các d?ch v? h? tr? ngôn ng? mi?n phí dành cho b?n. G?i s? [phone].         [facility] complies with applicable Federal civil rights laws and does not discriminate on the basis of race, color, national origin, age, disability, or sex.

## 2017-05-30 ENCOUNTER — HOSPITAL ENCOUNTER (OUTPATIENT)
Dept: SLEEP MEDICINE | Facility: OTHER | Age: 52
Discharge: HOME | End: 2017-05-30
Attending: PSYCHIATRY & NEUROLOGY
Payer: MEDICAID

## 2017-05-30 DIAGNOSIS — G47.20 SLEEP STAGE DYSFUNCTION: ICD-10-CM

## 2017-05-30 DIAGNOSIS — G47.30 SLEEP APNEA, UNSPECIFIED TYPE: ICD-10-CM

## 2017-05-30 PROCEDURE — 95810 POLYSOM 6/> YRS 4/> PARAM: CPT

## 2017-05-30 PROCEDURE — 95810 POLYSOM 6/> YRS 4/> PARAM: CPT | Mod: 26,,, | Performed by: PSYCHIATRY & NEUROLOGY

## 2017-05-31 NOTE — PROGRESS NOTES
Edita Riley was here for an overnight sleep study on 05/30/2017.  Pt education/cpap explanation given prior to study.    Pt did not meet Medicaid criteria for cpap. Some respiratory events are observed. Several did not meet  desat criteria.  Occasional soft snore noted. Poor sleep in early study.  Pt without complaint of distress or discomfort. Period of wake in later study- pt did report arthritic pain.  Pt reported this a typical night.    EKG Lead 2 appears with rare atrial couplet.  Low saturation 89%.    No technical difficulties to report.

## 2017-06-05 RX ORDER — SUCRALFATE 1 G/1
TABLET ORAL
Qty: 60 TABLET | Refills: 1 | Status: SHIPPED | OUTPATIENT
Start: 2017-06-05 | End: 2017-07-30 | Stop reason: SDUPTHER

## 2017-06-08 ENCOUNTER — TELEPHONE (OUTPATIENT)
Dept: SLEEP MEDICINE | Facility: CLINIC | Age: 52
End: 2017-06-08

## 2017-07-11 ENCOUNTER — OFFICE VISIT (OUTPATIENT)
Dept: OBSTETRICS AND GYNECOLOGY | Facility: CLINIC | Age: 52
End: 2017-07-11
Payer: MEDICAID

## 2017-07-11 VITALS
SYSTOLIC BLOOD PRESSURE: 126 MMHG | BODY MASS INDEX: 37.96 KG/M2 | HEIGHT: 67 IN | DIASTOLIC BLOOD PRESSURE: 86 MMHG | WEIGHT: 241.88 LBS

## 2017-07-11 DIAGNOSIS — Z12.4 PAP SMEAR FOR CERVICAL CANCER SCREENING: ICD-10-CM

## 2017-07-11 DIAGNOSIS — N95.2 VAGINAL ATROPHY: ICD-10-CM

## 2017-07-11 DIAGNOSIS — N89.8 VAGINAL DISCHARGE: Primary | ICD-10-CM

## 2017-07-11 DIAGNOSIS — R23.2 HOT FLASHES: ICD-10-CM

## 2017-07-11 PROCEDURE — 99213 OFFICE O/P EST LOW 20 MIN: CPT | Mod: S$PBB,,, | Performed by: OBSTETRICS & GYNECOLOGY

## 2017-07-11 PROCEDURE — 87480 CANDIDA DNA DIR PROBE: CPT

## 2017-07-11 PROCEDURE — 87660 TRICHOMONAS VAGIN DIR PROBE: CPT

## 2017-07-11 PROCEDURE — 99999 PR PBB SHADOW E&M-EST. PATIENT-LVL III: CPT | Mod: PBBFAC,,, | Performed by: OBSTETRICS & GYNECOLOGY

## 2017-07-11 PROCEDURE — 88142 CYTOPATH C/V THIN LAYER: CPT

## 2017-07-11 PROCEDURE — 99213 OFFICE O/P EST LOW 20 MIN: CPT | Mod: PBBFAC | Performed by: OBSTETRICS & GYNECOLOGY

## 2017-07-11 NOTE — PROGRESS NOTES
Gynecology    SUBJECTIVE:     Chief Complaint: Vaginal Discharge and Vaginal Itching       History of Present Illness:  52 year old who presents with two weeks of itching.  She reports some discharge as well with an odor. Also reports some burning.      Patient also reports a history of cervical cancer.  Reports that she had only her ovaries removed.  No radiation or chemotherapy? Ultrasound from 2016 here reveals absent uterus, tubes and ovaries.  Prior examinations from gyn's here also confirm.  Patient is unsure of actual history.  Surgery was done in [facility]- 2014. No records.     Review of Systems:  Review of Systems   Genitourinary: Positive for vaginal discharge and vaginal odor. Negative for pelvic pain, vaginal bleeding and vaginal pain.        OBJECTIVE:     Physical Exam:  Physical Exam   Constitutional: She is oriented to person, place, and time. She appears well-developed and well-nourished.   Pulmonary/Chest: Effort normal.   Abdominal: Soft.   Genitourinary: No labial fusion. There is no rash, tenderness, lesion or injury on the right labia. There is no rash, tenderness, lesion or injury on the left labia. No erythema, tenderness or bleeding in the vagina. No foreign body in the vagina. No signs of injury around the vagina. Vaginal discharge found.   Genitourinary Comments: Very narrow pelvis, vaginal atrophy, difficult exam;   Neurological: She is alert and oriented to person, place, and time.   Psychiatric: She has a normal mood and affect. Her behavior is normal. Judgment and thought content normal.   Nursing note and vitals reviewed.        ASSESSMENT:       ICD-10-CM ICD-9-CM    1. Vaginal discharge N89.8 623.5 Vaginosis Screen by DNA Probe   2. Hot flashes R23.2 782.62    3. Pap smear for cervical cancer screening Z12.4 V76.2 Liquid-based pap smear, screening   4. Vaginal atrophy N95.2 627.3 conjugated estrogens (PREMARIN) vaginal cream          Plan:      Edita was seen today for vaginal  discharge and vaginal itching.    Diagnoses and all orders for this visit:    Vaginal discharge  -     Vaginosis Screen by DNA Probe    Hot flashes    Pap smear for cervical cancer screening  -     Liquid-based pap smear, screening    Vaginal atrophy  -     conjugated estrogens (PREMARIN) vaginal cream; Place 0.5 g vaginally twice a week.    - vaginal estrogen sent to pharmacy; discussed use with patient  - pap smear today as patient reports cervical cancer was in 2014; last pap normal in 2015  - vaginosis screen for vaginal discharge  - will call/mail results to patient    Orders Placed This Encounter   Procedures    Vaginosis Screen by DNA Probe       Return for annual or prn.    Aisha Vega

## 2017-07-12 ENCOUNTER — TELEPHONE (OUTPATIENT)
Dept: OBSTETRICS AND GYNECOLOGY | Facility: CLINIC | Age: 52
End: 2017-07-12

## 2017-07-12 DIAGNOSIS — B37.9 YEAST INFECTION: Primary | ICD-10-CM

## 2017-07-12 LAB
CANDIDA RRNA VAG QL PROBE: POSITIVE
G VAGINALIS RRNA GENITAL QL PROBE: NEGATIVE
T VAGINALIS RRNA GENITAL QL PROBE: NEGATIVE

## 2017-07-12 RX ORDER — FLUCONAZOLE 150 MG/1
150 TABLET ORAL ONCE
Qty: 1 TABLET | Refills: 0 | Status: SHIPPED | OUTPATIENT
Start: 2017-07-12 | End: 2017-07-12

## 2017-07-12 NOTE — TELEPHONE ENCOUNTER
Called and informed pt that she does have a yeast infection and we sent Diflucan to her pharmacy. Pt verbalized understanding.

## 2017-07-12 NOTE — TELEPHONE ENCOUNTER
----- Message from Aisha Vega MD sent at 7/12/2017  9:05 AM CDT -----  Results reviewed.  Please call patient and notify her that she has a yeast infection.  I sent diflucan to her pharmacy.

## 2017-07-13 ENCOUNTER — TELEPHONE (OUTPATIENT)
Dept: NEUROLOGY | Facility: CLINIC | Age: 52
End: 2017-07-13

## 2017-07-13 NOTE — TELEPHONE ENCOUNTER
Spoke w/pt-    Explained that appt need to be rescheduled to a sooner time because provider would not be available. Agreed and rescheduled to same day @ 11:40am.

## 2017-07-20 ENCOUNTER — OFFICE VISIT (OUTPATIENT)
Dept: NEUROLOGY | Facility: CLINIC | Age: 52
End: 2017-07-20
Payer: MEDICAID

## 2017-07-20 VITALS
HEIGHT: 67 IN | SYSTOLIC BLOOD PRESSURE: 146 MMHG | HEART RATE: 79 BPM | DIASTOLIC BLOOD PRESSURE: 87 MMHG | WEIGHT: 242.5 LBS | BODY MASS INDEX: 38.06 KG/M2

## 2017-07-20 DIAGNOSIS — G56.01 CARPAL TUNNEL SYNDROME ON RIGHT: ICD-10-CM

## 2017-07-20 DIAGNOSIS — M79.7 FIBROMYALGIA: ICD-10-CM

## 2017-07-20 DIAGNOSIS — I10 ESSENTIAL HYPERTENSION: ICD-10-CM

## 2017-07-20 DIAGNOSIS — F32.A DEPRESSION, UNSPECIFIED DEPRESSION TYPE: ICD-10-CM

## 2017-07-20 DIAGNOSIS — G51.0 FACIAL PALSY: ICD-10-CM

## 2017-07-20 DIAGNOSIS — G51.39 HEMIFACIAL SPASM: Primary | ICD-10-CM

## 2017-07-20 PROCEDURE — 99214 OFFICE O/P EST MOD 30 MIN: CPT | Mod: S$PBB,,, | Performed by: PSYCHIATRY & NEUROLOGY

## 2017-07-20 PROCEDURE — 99213 OFFICE O/P EST LOW 20 MIN: CPT | Mod: PBBFAC | Performed by: PSYCHIATRY & NEUROLOGY

## 2017-07-20 PROCEDURE — 99999 PR PBB SHADOW E&M-EST. PATIENT-LVL III: CPT | Mod: PBBFAC,,, | Performed by: PSYCHIATRY & NEUROLOGY

## 2017-07-20 RX ORDER — DULOXETIN HYDROCHLORIDE 60 MG/1
60 CAPSULE, DELAYED RELEASE ORAL DAILY
Qty: 90 CAPSULE | Refills: 3 | Status: SHIPPED | OUTPATIENT
Start: 2017-07-20 | End: 2018-06-20 | Stop reason: SDUPTHER

## 2017-07-20 RX ORDER — LISINOPRIL 10 MG/1
10 TABLET ORAL DAILY
Qty: 90 TABLET | Refills: 3 | Status: SHIPPED | OUTPATIENT
Start: 2017-07-20 | End: 2018-06-20 | Stop reason: SDUPTHER

## 2017-07-20 NOTE — LETTER
July 20, 2017      Hammad Lackey MD  [address]  [place] [address]           [facility]  [address]  [place] [address]  Phone: [phone]          Patient: Edita Riley   MR Number: [id number]   YOB: 1965   Date of Visit: 7/20/2017       Dear Dr. Hammad Lackey:    Thank you for referring Edita Riley to me for evaluation. Attached you will find relevant portions of my assessment and plan of care.    If you have questions, please do not hesitate to call me. I look forward to following Edita Riley along with you.    Sincerely,    Refugio Benjamin MD    Enclosure  CC:  No Recipients    If you would like to receive this communication electronically, please contact [email or web address] or [phone] to request more information on [facility] [employer] access.    For providers and/or their staff who would like to refer a patient to [facility], please contact us through our one-stop-shop provider referral line, [facility], at [phone].    If you feel you have received this communication in error or would no longer like to receive these types of communications, please e-mail [email or web address]

## 2017-07-20 NOTE — PROGRESS NOTES
"Neurology Clinic Follow-Up Note    Impression:  1. 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 Evidence of fibromyalgia  3. Depression  4. Evidence of R CTS  5. HTN: uncontrolled    Plan:  1. Restart Cymbalta 60mg/d  2. R wrist splint  3. Call in 1 month if not better  4. Increase lisinospril to 10mg/d and f/u with Dr. Lackey re BP  5. RTC annually or sooner, prn      Problem List Items Addressed This Visit        1 - High    Carpal tunnel syndrome on right    Facial palsy    Hemifacial spasm - Primary       2     Depression    Essential hypertension    Fibromyalgia      Other Visit Diagnoses    None.         Patient returns for follow-up of above.  Face: hemifacial spasm worse since she ran out of Cymbalta a few months ago  Depression: worse since stopping Cymbalta  FM:  Worse since stopping Cymbalta  BP has been running high  New complaint of R hand pain and dropping objects from R hand.  Awakens from sleep with numbness      Outpatient Prescriptions Marked as Taking for the 7/20/17 encounter (Office Visit) with Refugio Benjamin MD   Medication Sig Dispense Refill    conjugated estrogens (PREMARIN) vaginal cream Place 0.5 g vaginally twice a week. 45 g 1    cyclobenzaprine (FLEXERIL) 10 MG tablet TAKE 1 TABLET (10 MG TOTAL) BY MOUTH NIGHTLY AS NEEDED FOR MUSCLE SPASMS.  1    meclizine (ANTIVERT) 25 mg tablet Take 1 tablet (25 mg total) by mouth 3 (three) times daily as needed for Dizziness. 30 tablet 6    omeprazole (PRILOSEC) 40 MG capsule TAKE ONE CAPSULE BY MOUTH DAILY FOR STOMACH 30 capsule 11    sucralfate (CARAFATE) 1 gram tablet TAKE 1 TABLET (1 G TOTAL) BY MOUTH 2 (TWO) TIMES DAILY AS NEEDED. 60 tablet 1    [DISCONTINUED] lisinopril (PRINIVIL,ZESTRIL) 5 MG tablet TAKE ONE TABLET BY MOUTH EVERY DAY FOR BLOOD PRESSURE 30 tablet 11       BP (!) 146/87   Pulse 79   Ht 5' 7" (1.702 m)   Wt 110 kg (242 lb 8.1 oz)   BMI 37.98 kg/m²   Overweight.  Awake, alert and " oriented.  Language is normal. EOMF without nystagmus, VFF, L LMN facial weakness with L HFS. No UE drift.  No extinction to double simultaneous tactile stimulation.  Muscle power is normal.  + R Tinel's and R Phalan's.  Sensation intact. + diffuse muscle tenderness. Gait is steady.    Refugio Benjamin MD

## 2017-07-30 RX ORDER — SUCRALFATE 1 G/1
TABLET ORAL
Qty: 60 TABLET | Refills: 1 | Status: SHIPPED | OUTPATIENT
Start: 2017-07-30 | End: 2017-11-16 | Stop reason: SDUPTHER

## 2017-08-02 ENCOUNTER — TELEPHONE (OUTPATIENT)
Dept: INTERNAL MEDICINE | Facility: CLINIC | Age: 52
End: 2017-08-02

## 2017-08-02 DIAGNOSIS — Z12.31 VISIT FOR SCREENING MAMMOGRAM: Primary | ICD-10-CM

## 2017-08-02 NOTE — TELEPHONE ENCOUNTER
----- Message from Katie Rothman MA sent at 8/2/2017  2:49 PM CDT -----  Contact: self - [phone]  Type: Orders Request    What orders/ testing are being requested? Mammogram     Is there a future appointment scheduled for the patient with PCP? No     When?    Comments: Please call. Thanks!

## 2017-08-07 ENCOUNTER — HOSPITAL ENCOUNTER (OUTPATIENT)
Dept: RADIOLOGY | Facility: HOSPITAL | Age: 52
Discharge: HOME | End: 2017-08-07
Attending: INTERNAL MEDICINE
Payer: MEDICAID

## 2017-08-07 VITALS — HEIGHT: 67 IN | WEIGHT: 242 LBS | BODY MASS INDEX: 37.98 KG/M2

## 2017-08-07 DIAGNOSIS — Z12.31 VISIT FOR SCREENING MAMMOGRAM: ICD-10-CM

## 2017-08-07 PROCEDURE — 77063 BREAST TOMOSYNTHESIS BI: CPT | Mod: 26,,, | Performed by: RADIOLOGY

## 2017-08-07 PROCEDURE — 77067 SCR MAMMO BI INCL CAD: CPT | Mod: 26,,, | Performed by: RADIOLOGY

## 2017-08-07 PROCEDURE — 77067 SCR MAMMO BI INCL CAD: CPT | Mod: TC

## 2017-09-29 PROCEDURE — 96374 THER/PROPH/DIAG INJ IV PUSH: CPT

## 2017-09-29 PROCEDURE — 93010 ELECTROCARDIOGRAM REPORT: CPT | Mod: ,,, | Performed by: INTERNAL MEDICINE

## 2017-09-29 PROCEDURE — 93005 ELECTROCARDIOGRAM TRACING: CPT

## 2017-09-29 PROCEDURE — 99285 EMERGENCY DEPT VISIT HI MDM: CPT | Mod: ,,, | Performed by: EMERGENCY MEDICINE

## 2017-09-29 PROCEDURE — 99284 EMERGENCY DEPT VISIT MOD MDM: CPT | Mod: 25

## 2017-09-30 ENCOUNTER — HOSPITAL ENCOUNTER (EMERGENCY)
Facility: HOSPITAL | Age: 52
Discharge: HOME | End: 2017-09-30
Attending: EMERGENCY MEDICINE
Payer: MEDICAID

## 2017-09-30 VITALS
HEART RATE: 80 BPM | RESPIRATION RATE: 18 BRPM | WEIGHT: 233 LBS | SYSTOLIC BLOOD PRESSURE: 134 MMHG | DIASTOLIC BLOOD PRESSURE: 65 MMHG | TEMPERATURE: 98 F | HEIGHT: 67 IN | OXYGEN SATURATION: 98 % | BODY MASS INDEX: 36.57 KG/M2

## 2017-09-30 DIAGNOSIS — R07.9 CHEST PAIN: Primary | ICD-10-CM

## 2017-09-30 DIAGNOSIS — R10.9 ABDOMINAL PAIN, UNSPECIFIED LOCATION: ICD-10-CM

## 2017-09-30 LAB
ALBUMIN SERPL BCP-MCNC: 3.3 G/DL
ALP SERPL-CCNC: 131 U/L
ALT SERPL W/O P-5'-P-CCNC: 17 U/L
ANION GAP SERPL CALC-SCNC: 9 MMOL/L
AST SERPL-CCNC: 18 U/L
BASOPHILS # BLD AUTO: 0.04 K/UL
BASOPHILS NFR BLD: 0.4 %
BILIRUB SERPL-MCNC: 0.3 MG/DL
BILIRUB UR QL STRIP: NEGATIVE
BUN SERPL-MCNC: 10 MG/DL
BUN SERPL-MCNC: 10 MG/DL (ref 6–30)
CALCIUM SERPL-MCNC: 9.3 MG/DL
CHLORIDE SERPL-SCNC: 100 MMOL/L (ref 95–110)
CHLORIDE SERPL-SCNC: 101 MMOL/L
CLARITY UR REFRACT.AUTO: CLEAR
CO2 SERPL-SCNC: 27 MMOL/L
COLOR UR AUTO: YELLOW
CREAT SERPL-MCNC: 1.2 MG/DL (ref 0.5–1.4)
CREAT SERPL-MCNC: 1.3 MG/DL
DIFFERENTIAL METHOD: NORMAL
EOSINOPHIL # BLD AUTO: 0.1 K/UL
EOSINOPHIL NFR BLD: 0.9 %
ERYTHROCYTE [DISTWIDTH] IN BLOOD BY AUTOMATED COUNT: 13.8 %
EST. GFR  (AFRICAN AMERICAN): 53.7 ML/MIN/1.73 M^2
EST. GFR  (NON AFRICAN AMERICAN): 46.6 ML/MIN/1.73 M^2
GLUCOSE SERPL-MCNC: 102 MG/DL (ref 70–110)
GLUCOSE SERPL-MCNC: 97 MG/DL
GLUCOSE UR QL STRIP: NEGATIVE
HCT VFR BLD AUTO: 41.5 %
HCT VFR BLD CALC: 44 %PCV (ref 36–54)
HGB BLD-MCNC: 13.5 G/DL
HGB UR QL STRIP: NEGATIVE
KETONES UR QL STRIP: NEGATIVE
LEUKOCYTE ESTERASE UR QL STRIP: NEGATIVE
LIPASE SERPL-CCNC: 15 U/L
LYMPHOCYTES # BLD AUTO: 2.6 K/UL
LYMPHOCYTES NFR BLD: 24.2 %
MCH RBC QN AUTO: 28.4 PG
MCHC RBC AUTO-ENTMCNC: 32.5 G/DL
MCV RBC AUTO: 87 FL
MONOCYTES # BLD AUTO: 1 K/UL
MONOCYTES NFR BLD: 9.6 %
NEUTROPHILS # BLD AUTO: 6.9 K/UL
NEUTROPHILS NFR BLD: 64.6 %
NITRITE UR QL STRIP: NEGATIVE
PH UR STRIP: 5 [PH] (ref 5–8)
PLATELET # BLD AUTO: 286 K/UL
PMV BLD AUTO: 10.9 FL
POC IONIZED CALCIUM: 1.15 MMOL/L (ref 1.06–1.42)
POC TCO2 (MEASURED): 26 MMOL/L (ref 23–29)
POTASSIUM BLD-SCNC: 3.9 MMOL/L (ref 3.5–5.1)
POTASSIUM SERPL-SCNC: 3.9 MMOL/L
PROT SERPL-MCNC: 7.8 G/DL
PROT UR QL STRIP: NEGATIVE
RBC # BLD AUTO: 4.76 M/UL
SAMPLE: NORMAL
SODIUM BLD-SCNC: 137 MMOL/L (ref 136–145)
SODIUM SERPL-SCNC: 137 MMOL/L
SP GR UR STRIP: >=1.03 (ref 1–1.03)
URN SPEC COLLECT METH UR: ABNORMAL
UROBILINOGEN UR STRIP-ACNC: NEGATIVE EU/DL
WBC # BLD AUTO: 10.71 K/UL

## 2017-09-30 PROCEDURE — 83690 ASSAY OF LIPASE: CPT

## 2017-09-30 PROCEDURE — 85025 COMPLETE CBC W/AUTO DIFF WBC: CPT

## 2017-09-30 PROCEDURE — 25000003 PHARM REV CODE 250: Performed by: EMERGENCY MEDICINE

## 2017-09-30 PROCEDURE — 80053 COMPREHEN METABOLIC PANEL: CPT

## 2017-09-30 PROCEDURE — 25500020 PHARM REV CODE 255: Performed by: EMERGENCY MEDICINE

## 2017-09-30 PROCEDURE — 81003 URINALYSIS AUTO W/O SCOPE: CPT

## 2017-09-30 PROCEDURE — 63600175 PHARM REV CODE 636 W HCPCS: Performed by: EMERGENCY MEDICINE

## 2017-09-30 RX ORDER — ONDANSETRON 2 MG/ML
4 INJECTION INTRAMUSCULAR; INTRAVENOUS
Status: COMPLETED | OUTPATIENT
Start: 2017-09-30 | End: 2017-09-30

## 2017-09-30 RX ADMIN — ALUMINUM HYDROXIDE, MAGNESIUM HYDROXIDE, AND SIMETHICONE 50 ML: 200; 200; 20 SUSPENSION ORAL at 01:09

## 2017-09-30 RX ADMIN — IOHEXOL 100 ML: 350 INJECTION, SOLUTION INTRAVENOUS at 02:09

## 2017-09-30 RX ADMIN — ONDANSETRON 4 MG: 2 INJECTION INTRAMUSCULAR; INTRAVENOUS at 01:09

## 2017-09-30 NOTE — ED PROVIDER NOTES
Encounter Date: 9/29/2017    SCRIBE #1 NOTE: I, Trudy Bucio, am scribing for, and in the presence of,  Dr. Lynn. I have scribed the entire note.       History     Chief Complaint   Patient presents with    Hernia     c/o hiatal hernia pain since monday.      Time patient was seen by the provider: 1:19 AM      The patient is a 52 y.o. female with hx of: HTN, hiatal hernia, stroke, GERD, and cervical cancer that presents to the ED with a complaint of abdominal pain that onset 5 days ago and has been constant. She believes that this pain is related to a hernia that she has had for the past 2 years. Pain is similar to previous episodes. Pt also complains of chest tightness and SOB and notes 2 episodes of vomiting 5 days ago. She takes Carafate which usually helps but is currently not providing relief of sx. She notes a BM every 2-3 days as baseline and also complains of having chills and night sweats since her hernia. She denies being seen by GI.         The history is provided by the patient, the spouse and medical records.     Review of patient's allergies indicates:   Allergen Reactions    Bee sting [allergen ext-venom-honey bee]      Rash      Grass pollen-bermuda, standard      rash     Past Medical History:   Diagnosis Date    Cervical cancer 2014    GERD (gastroesophageal reflux disease)     Hiatal hernia 2014    Hypertension     Lactose intolerance     Osteoarthritis of back     Stroke 1972     Past Surgical History:   Procedure Laterality Date    BILATERAL OOPHORECTOMY  2015    CHOLECYSTECTOMY  11/09/2016    Done at [facility], path showed chronic cholecystitis and gallstones    COLONOSCOPY  2014    COLONOSCOPY N/A 10/24/2016    at [facility], Dr Thomas    ESOPHAGOGASTRODUODENOSCOPY  2014    HYSTERECTOMY  2015    OOPHORECTOMY       Family History   Problem Relation Age of Onset    Heart disease Sister     Heart disease Maternal Grandmother     Colon cancer Father     Esophageal cancer  Father     Cancer Father      Lung    Cancer Mother      Cervical    Rectal cancer Neg Hx     Stomach cancer Neg Hx     Crohn's disease Neg Hx     Ulcerative colitis Neg Hx      Social History   Substance Use Topics    Smoking status: Never Smoker    Smokeless tobacco: Never Used    Alcohol use No     Review of Systems   Constitutional: Positive for chills and diaphoresis. Negative for fever.   HENT: Negative for nosebleeds and sore throat.    Eyes: Negative for visual disturbance.   Respiratory: Positive for chest tightness and shortness of breath.    Cardiovascular: Negative for chest pain.   Gastrointestinal: Positive for abdominal pain and vomiting.   Genitourinary: Negative for dysuria and hematuria.   Musculoskeletal: Negative for back pain.   Skin: Negative for rash.   Neurological: Negative for weakness.       Physical Exam     Initial Vitals [09/29/17 2142]   BP Pulse Resp Temp SpO2   (!) 164/79 91 18 97.7 °F (36.5 °C) 100 %      MAP       107.33         Physical Exam    Nursing note and vitals reviewed.  Constitutional: She appears well-developed and well-nourished. She is not diaphoretic. No distress.   HENT:   Head: Normocephalic and atraumatic.   Mouth/Throat: Oropharynx is clear and moist.   Eyes: EOM are normal. Pupils are equal, round, and reactive to light.   Neck: Normal range of motion. Neck supple. No JVD present.   Cardiovascular: Normal rate, regular rhythm, normal heart sounds and intact distal pulses.   Pulmonary/Chest: Breath sounds normal. No respiratory distress. She has no wheezes. She has no rhonchi. She has no rales.   Abdominal: Soft. She exhibits no distension. There is tenderness (diffuse).   Musculoskeletal: Normal range of motion. She exhibits no edema (no leg edema).   Lymphadenopathy:     She has no cervical adenopathy.   Neurological: She is alert and oriented to person, place, and time. She has normal strength. No cranial nerve deficit or sensory deficit.   Skin: Skin  is warm and dry.         ED Course   Procedures  Labs Reviewed   COMPREHENSIVE METABOLIC PANEL - Abnormal; Notable for the following:        Result Value    Albumin 3.3 (*)     eGFR if  53.7 (*)     eGFR if non  46.6 (*)     All other components within normal limits   URINALYSIS, REFLEX TO URINE CULTURE - Abnormal; Notable for the following:     Specific Gravity, UA >=1.030 (*)     All other components within normal limits    Narrative:     Preferred Collection Type->Urine, Clean Catch   CBC W/ AUTO DIFFERENTIAL   LIPASE   ISTAT PROCEDURE     EKG Readings: (Independently Interpreted)   NSR at 83 bpm with no ischemic changes       X-Rays:   Independently Interpreted Readings:   Abdomen:   Abdomen and Pelvis CT with Contrast - No acute changes.     Medical Decision Making:   History:   Old Medical Records: I decided to obtain old medical records.  Old Records Summarized: records from previous admission(s).  Initial Assessment:   This is an emergent evaluation of a 52 y.o. female presenting with abdominal pain. My initial differential diagnoses include, but are not limited to: bowel obstruction, colitis, gastritis, hernia.     2:53 AM Labs reviewed with no significant abnormality. CT Abd/Pel shows no acute process. Pt stable for discharge.   Clinical Tests:   Lab Tests: Ordered and Reviewed  Radiological Study: Ordered and Reviewed  Medical Tests: Ordered and Reviewed            Scribe Attestation:   Scribe #1: I performed the above scribed service and the documentation accurately describes the services I performed. I attest to the accuracy of the note.    Attending Attestation:           Physician Attestation for Scribe:  Physician Attestation Statement for Scribe #1: I, Dr. Lynn, reviewed documentation, as scribed by Trudy Bucio in my presence, and it is both accurate and complete.                 ED Course      Clinical Impression:   The primary encounter diagnosis was Chest  pain. A diagnosis of Abdominal pain, unspecified location was also pertinent to this visit.    Disposition:   Disposition: Discharged  Condition: Stable                        Chuyita Lynn MD  10/01/17 7158

## 2017-10-02 ENCOUNTER — NURSE TRIAGE (OUTPATIENT)
Dept: ADMINISTRATIVE | Facility: CLINIC | Age: 52
End: 2017-10-02

## 2017-10-02 ENCOUNTER — TELEPHONE (OUTPATIENT)
Dept: GASTROENTEROLOGY | Facility: CLINIC | Age: 52
End: 2017-10-02

## 2017-10-02 NOTE — TELEPHONE ENCOUNTER
Reason for Disposition   SEVERE chest pain    Protocols used: ST CHEST PAIN-A-OH    Edita is calling to report chest pain that is a 10. Per protocol advised ER. Edita states that she went to ER on Friday and they could not find anything.  States she has a hiatal hernia and knows this is what is causing pain.  Again advised ER.  Edita refused disposition.  Please contact Edita to advise.  She can be reached at [phone].

## 2017-10-02 NOTE — TELEPHONE ENCOUNTER
----- Message from Ashley Murry MA sent at 10/2/2017 12:49 PM CDT -----  Contact: self  [phone]  States she is calling due to having problems with pain all over due to her hernia.

## 2017-10-02 NOTE — TELEPHONE ENCOUNTER
Pt spoke with Gastroenterology and is going to be seen with them. Will defer to them for continued management of pain due to hiatal hernia.

## 2017-10-06 ENCOUNTER — TELEPHONE (OUTPATIENT)
Dept: GASTROENTEROLOGY | Facility: CLINIC | Age: 52
End: 2017-10-06

## 2017-10-06 ENCOUNTER — OFFICE VISIT (OUTPATIENT)
Dept: GASTROENTEROLOGY | Facility: CLINIC | Age: 52
End: 2017-10-06
Payer: MEDICAID

## 2017-10-06 VITALS
BODY MASS INDEX: 35.12 KG/M2 | SYSTOLIC BLOOD PRESSURE: 135 MMHG | HEIGHT: 67 IN | DIASTOLIC BLOOD PRESSURE: 82 MMHG | HEART RATE: 93 BPM | WEIGHT: 223.75 LBS

## 2017-10-06 DIAGNOSIS — M54.50 CHRONIC BILATERAL LOW BACK PAIN WITHOUT SCIATICA: ICD-10-CM

## 2017-10-06 DIAGNOSIS — K21.9 GASTROESOPHAGEAL REFLUX DISEASE, ESOPHAGITIS PRESENCE NOT SPECIFIED: ICD-10-CM

## 2017-10-06 DIAGNOSIS — G89.29 CHRONIC BILATERAL LOW BACK PAIN WITHOUT SCIATICA: ICD-10-CM

## 2017-10-06 DIAGNOSIS — M54.9 UPPER BACK PAIN: Primary | ICD-10-CM

## 2017-10-06 PROCEDURE — 99213 OFFICE O/P EST LOW 20 MIN: CPT | Mod: S$PBB,,, | Performed by: INTERNAL MEDICINE

## 2017-10-06 PROCEDURE — 99999 PR PBB SHADOW E&M-EST. PATIENT-LVL IV: CPT | Mod: PBBFAC,,, | Performed by: INTERNAL MEDICINE

## 2017-10-06 PROCEDURE — 99214 OFFICE O/P EST MOD 30 MIN: CPT | Mod: PBBFAC | Performed by: INTERNAL MEDICINE

## 2017-10-06 RX ORDER — LISINOPRIL 5 MG/1
5 TABLET ORAL DAILY
Refills: 11 | COMMUNITY
Start: 2017-07-25 | End: 2017-10-17

## 2017-10-06 RX ORDER — ESTRADIOL 1 MG/1
1 TABLET ORAL DAILY
Refills: 11 | Status: ON HOLD | COMMUNITY
Start: 2017-09-07 | End: 2018-12-06 | Stop reason: HOSPADM

## 2017-10-06 NOTE — PROGRESS NOTES
REASON FOR VISIT:  Generalized abdominal pain and regurgitation of gastric   contents.    HISTORY OF PRESENT ILLNESS:  Ms. Riley is a 52-year-old with known   medium-sized hiatal hernia who has been complaining of generalized abdominal   pain, back pain and diffuse muscle pain.  She was recently in the Emergency Room   and CT of the abdomen did not reveal any acute pathology.  She also has   intermittent issues with dysphagia, but currently her main issue is generalized   body pain.  She also complains of intermittent pain down her lower back into her   thigh, but not consistent with sciatica.  No fevers, no chills.  She continues   to be on Carafate on a p.r.n. basis along with omeprazole 40 mg daily.    PAST MEDICAL, SURGICAL, SOCIAL AND FAMILY HISTORY:  Reviewed.    MEDICATIONS AND ALLERGIES:  Reviewed.    REVIEW OF SYSTEMS:  CONSTITUTIONAL:  No fever, no chills.  Appetite is stable.  CARDIOVASCULAR:  No angina or palpitation.  RESPIRATORY:  No shortness of breath or wheezing.  GASTROINTESTINAL:  See HPI.    PHYSICAL EXAMINATION:  VITAL SIGNS:  See EPIC.  GENERAL:  Awake, alert and oriented x3, in no acute distress.  ABDOMEN:  Obese, soft, mild diffuse tenderness to palpation.  No guarding or   rigidity.  There is tenderness along her muscles of the upper extremity and   lower extremity.    Previous labs and imaging reviewed.    IMPRESSION:  Generalized abdominal pain, upper and low back pain - etiology   unclear, not certain if this is secondary to her fibromyalgia, although given   the fact that she does have intermittent regurgitation of gastric contents, we   will proceed with further evaluation.    RECOMMENDATIONS:  1.  Barium esophagram.  2.  Schedule MRI of the thoracic and lumbar spine and schedule a referral to   Rheumatology to see if she needs further evaluation besides the current   diagnosis of fibromyalgia.  Further recommendation will be based on the above.      ACT/HN  dd: 10/06/2017  10:29:58 (CDT)  td: 10/06/2017 22:11:25 (GEORGE)  Doc ID   #[id number]  Job ID #[id number]    CC:

## 2017-10-06 NOTE — TELEPHONE ENCOUNTER
----- Message from Ashley Murry MA sent at 10/6/2017 12:16 PM CDT -----  Contact: self  [phone]  States she saw him today and she needs the nurse to change the appointment.

## 2017-10-11 ENCOUNTER — HOSPITAL ENCOUNTER (OUTPATIENT)
Dept: RADIOLOGY | Facility: HOSPITAL | Age: 52
Discharge: HOME | End: 2017-10-11
Attending: INTERNAL MEDICINE
Payer: MEDICAID

## 2017-10-11 ENCOUNTER — TELEPHONE (OUTPATIENT)
Dept: GASTROENTEROLOGY | Facility: CLINIC | Age: 52
End: 2017-10-11

## 2017-10-11 DIAGNOSIS — K21.9 GASTROESOPHAGEAL REFLUX DISEASE, ESOPHAGITIS PRESENCE NOT SPECIFIED: ICD-10-CM

## 2017-10-11 PROCEDURE — 74220 X-RAY XM ESOPHAGUS 1CNTRST: CPT | Mod: 26,,, | Performed by: RADIOLOGY

## 2017-10-11 PROCEDURE — 74220 X-RAY XM ESOPHAGUS 1CNTRST: CPT | Mod: TC

## 2017-10-11 NOTE — TELEPHONE ENCOUNTER
----- Message from Arden Gutiérrez MD sent at 10/11/2017  3:35 PM CDT -----  Barium esophagram was normal.

## 2017-10-12 ENCOUNTER — TELEPHONE (OUTPATIENT)
Dept: GASTROENTEROLOGY | Facility: CLINIC | Age: 52
End: 2017-10-12

## 2017-10-12 NOTE — TELEPHONE ENCOUNTER
Spoke with pt informed her that the mri has been cancelled she needs to see back and spine dr. cedillo scheduled for pt and poncho mailed ,   [facility] sent a message for me to inform pt to go to [facility] or [facility] patient is unable to get an appt

## 2017-10-12 NOTE — TELEPHONE ENCOUNTER
----- Message from Lea Townsend RN sent at 10/12/2017  1:55 PM CDT -----  Contact: [phone]  Mohan,  I do not have any open appointments at this time, you can refer the pt. To [facility] @ [phone] or [facility] [phone].    Thanks, Lea  ----- Message -----  From: Mohan Short MA  Sent: 10/6/2017  10:50 AM  To: AMY Bowers Dr. would like this patient to be seen in rheumatology pt has medicaid and her diagnosis is fibromyalgia please call patient to schedule appointment   Thanks

## 2017-10-13 ENCOUNTER — NURSE TRIAGE (OUTPATIENT)
Dept: ADMINISTRATIVE | Facility: CLINIC | Age: 52
End: 2017-10-13

## 2017-10-13 NOTE — TELEPHONE ENCOUNTER
Pt called re MRI. Insurance wont cover procedure at [facility]. Pt Told to go to [facility]. Office message sent to ROLY RAMSAY to call pt as she states that she has repeated tried to get through to [facility] without success. Call back with questions.     Reason for Disposition   [1] Follow-up call to recent contact AND [2] information only call, no triage required    Protocols used: ST INFORMATION ONLY CALL-A-

## 2017-10-16 ENCOUNTER — TELEPHONE (OUTPATIENT)
Dept: INTERNAL MEDICINE | Facility: CLINIC | Age: 52
End: 2017-10-16

## 2017-10-16 DIAGNOSIS — M54.9 UPPER BACK PAIN: Primary | ICD-10-CM

## 2017-10-16 DIAGNOSIS — G89.29 CHRONIC MIDLINE LOW BACK PAIN WITHOUT SCIATICA: ICD-10-CM

## 2017-10-16 DIAGNOSIS — M54.50 CHRONIC MIDLINE LOW BACK PAIN WITHOUT SCIATICA: ICD-10-CM

## 2017-10-16 NOTE — TELEPHONE ENCOUNTER
----- Message from Makenna Medina sent at 10/16/2017  9:24 AM CDT -----  Contact: self   Patient would like to get a referral.  Does the patient already have the specialty clinic appointment scheduled:  No   If yes, what date is the appointment scheduled:  no   Referral to what specialty:  MRI   Reason (be specific):   Have Mri done as [facility]   Does the patient want the referral with a specific physician:  no  Is this an [facility] or [facility]:  [facility]  Comments:      ##Advise the patient that once the physician approves this either a nurse or the  will return their call##

## 2017-10-16 NOTE — TELEPHONE ENCOUNTER
New MRI orders for lumbar and thoracic spine placed into system. Please schedule this appointment with the patient.    Or, is the patient going to have the MRI done at [facility]? If so I will need to put in as an external order and have it printed and signed. Please confirm with the patient.

## 2017-10-16 NOTE — TELEPHONE ENCOUNTER
----- Message from Jerson Heaton sent at 10/16/2017  1:04 PM CDT -----  Contact: Pt at [phone]  Patient would like to get medical advice.  Symptoms (please be specific):  Muscle aches all over body   How long has patient had these symptoms:  About 3 weeks  Pharmacy name and phone #:  [id number] - [place], [place] - [address]   [phone] (Phone)  [phone] (Fax)      Any drug allergies:  See chart  Comments:

## 2017-10-16 NOTE — TELEPHONE ENCOUNTER
Pt currently has an order for a MRI, but she is stating that it needs to come as a referral from you per her insurance. Please advise. Thank you.

## 2017-10-17 ENCOUNTER — TELEPHONE (OUTPATIENT)
Dept: GASTROENTEROLOGY | Facility: CLINIC | Age: 52
End: 2017-10-17

## 2017-10-17 ENCOUNTER — OFFICE VISIT (OUTPATIENT)
Dept: INTERNAL MEDICINE | Facility: CLINIC | Age: 52
End: 2017-10-17
Payer: MEDICAID

## 2017-10-17 VITALS
SYSTOLIC BLOOD PRESSURE: 135 MMHG | DIASTOLIC BLOOD PRESSURE: 69 MMHG | WEIGHT: 225.31 LBS | BODY MASS INDEX: 35.36 KG/M2 | HEIGHT: 67 IN | HEART RATE: 108 BPM

## 2017-10-17 DIAGNOSIS — M79.7 FIBROMYALGIA: Primary | ICD-10-CM

## 2017-10-17 PROCEDURE — 99214 OFFICE O/P EST MOD 30 MIN: CPT | Mod: PBBFAC | Performed by: INTERNAL MEDICINE

## 2017-10-17 PROCEDURE — 99214 OFFICE O/P EST MOD 30 MIN: CPT | Mod: S$PBB,,, | Performed by: INTERNAL MEDICINE

## 2017-10-17 PROCEDURE — 99999 PR PBB SHADOW E&M-EST. PATIENT-LVL IV: CPT | Mod: PBBFAC,,, | Performed by: INTERNAL MEDICINE

## 2017-10-17 RX ORDER — PREGABALIN 75 MG/1
75 CAPSULE ORAL 2 TIMES DAILY
Qty: 60 CAPSULE | Refills: 3 | Status: SHIPPED | OUTPATIENT
Start: 2017-10-17 | End: 2017-10-18

## 2017-10-17 NOTE — TELEPHONE ENCOUNTER
Ma spoke with pt , and told patient that [facility] could not offer her an appt and she needs to contact [facility] or [facility], pt asked about her mri , it was denied dr almaguer referred pt to back and spine dr

## 2017-10-17 NOTE — PROGRESS NOTES
"Subjective:       Patient ID: Edita Riley is a 52 y.o. female.    Chief Complaint: Pain (x three weeks, generalized )    Ms. Riley is a 51 yo AAF w/ fibromyalgia, CN7 palsy, 2014 cervical cancer presenting to clinic today c/o 1 month history of progressive, constant 10/10 diffuse pain in every muscle. She reports this pain is not entirely like her fibromyalgia pain.  She went to PT last year and only noted mild improvement in her pain and currently takes flexeril & cymbalta with only minimal improvement noted.  There is nothing that makes her pain better and all things make her pain worse.  On review of systems she is pan-positive for all findings except active wound, hematochezia, melena, and hematuria.         Review of Systems   Constitutional: Positive for chills and fever.   HENT: Positive for ear pain, hearing loss, mouth sores and sore throat.    Eyes: Positive for photophobia, pain and visual disturbance.   Respiratory: Positive for apnea, cough, chest tightness and shortness of breath.    Cardiovascular: Positive for chest pain and palpitations.   Gastrointestinal: Positive for abdominal distention, abdominal pain, constipation, diarrhea, nausea, rectal pain and vomiting. Negative for anal bleeding and blood in stool.   Endocrine: Positive for cold intolerance, heat intolerance, polydipsia and polyuria.   Genitourinary: Positive for dysuria. Negative for hematuria.   Musculoskeletal: Positive for arthralgias, back pain, myalgias and neck stiffness.   Skin: Positive for rash. Negative for wound.   Neurological: Positive for dizziness, syncope and numbness.       Objective:       Vitals:    10/17/17 1542   BP: 135/69   Pulse: 108   Weight: 102.2 kg (225 lb 5 oz)   Height: 5' 7" (1.702 m)       Physical Exam   Constitutional: She is oriented to person, place, and time. She appears well-developed and well-nourished.   HENT:   Head: Normocephalic and atraumatic.   Mouth/Throat: Oropharynx is " clear and moist. No oropharyngeal exudate.   Eyes: Conjunctivae and EOM are normal. Pupils are equal, round, and reactive to light.   Neck: Normal range of motion. Neck supple. No thyromegaly present.   Cardiovascular: Regular rhythm, normal heart sounds and intact distal pulses.  Tachycardia present.  Exam reveals no gallop and no friction rub.    No murmur heard.  Pulmonary/Chest: Effort normal and breath sounds normal. No respiratory distress. She has no wheezes. She has no rales. She exhibits no tenderness.   Abdominal: Soft. Bowel sounds are normal. She exhibits no distension. There is generalized tenderness.   Musculoskeletal: Normal range of motion. She exhibits tenderness (Diffuse tenderness to all palpation). She exhibits no edema.   Neurological: She is alert and oriented to person, place, and time. A cranial nerve deficit (R CN7 ) is present.   Skin: Skin is warm and dry. No erythema.   Vitals reviewed.      Assessment:       1. Fibromyalgia        Plan:     1. Fibromyalgia  Given the diffuse nature of the pain and paucity of other physical exam findings, most likley her diffuse pain is 2/2 to her long-standing fibromyalgia  -Start Lyrica 75 mg po BID  -Ambulatory referral to PT  -f/u w/ her PCP in 4 weeks    Ender Mahan MD  PGY - 2  Pager: [phone]

## 2017-10-17 NOTE — TELEPHONE ENCOUNTER
----- Message from Kathleen Pallavi sent at 10/17/2017  1:39 PM CDT -----  Contact: self - [phone]  Brock - wants to talk to you re her 3 procedures - states you need to call [facility] for prior auth at  -  Please call patient at

## 2017-10-17 NOTE — PATIENT INSTRUCTIONS
Understanding Fibromyalgia     People with fibromyalgia tend to have at least 11 of the 18 tender points shown above.     Fibromyalgia is a condition that causes pain at specific points on the body, stiffness, and fatigue.  What are the symptoms of fibromyalgia?  In most cases, you will have tender points on your body. These points are very sore, especially when touched. Finding several of these points helps your healthcare provider diagnose fibromyalgia.  Along with the tender points, you may have some or all of the following symptoms:  · Constant tiredness (fatigue), even after a full nights sleep (non-restorative sleep)  · A burning or throbbing pain in many parts of the body (this pain may vary during the day)  · Stiffness or aching all over your body  · Numbness or tingling in your arms and legs  · Trouble sleeping  · Bowel problems (bloating, diarrhea, constipation)  · Headaches  · Depression  How is fibromyalgia diagnosed?  There is no lab test to diagnose fibromyalgia. Instead, your healthcare provider will take your health history and examine your joints and muscles. Certain criteria are used when diagnosing fibromyalgia. Symptoms need to be present for at least 3 months. Tests may be done to rule out other conditions with similar symptoms. Then, together you can design a plan to help you manage your symptoms.   How is fibromyalgia treated?  Several medications are approved to treat fibromyalgia. Two were originally made to treat depression. They are duloxetine, and milnacipran. A third, called pregaballin, was developed to treat nerve pain. Certain medicines used to treat depression have been helpful with fibromyalgia. Other medications include pain relievers such as acetaminophen or stronger narcotics. These may be prescribed short term.  NSAIDs (nonsteroidal anti-inflammatory drugs) including aspirin, ibuprofen, and naproxen are used to relieve pain.  Getting enough sleep, regular exercise, and eating  a healthy diet can help manage symptoms. Cognitive behavioral therapy (a form of psychotherapy) can be helpful for fibromyalgia. Some people find alternative treatments such as massage, chiropractic treatments, biofeedback, or acupuncture also help with symptoms.  Date Last Reviewed: 2/14/2016  © 9290-3930 [employer]. [address] [place] [address], [place] [address]. All rights reserved. This information is not intended as a substitute for professional medical care. Always follow your healthcare professional's instructions.        Managing Fibromyalgia    Fibromyalgia is a chronic illness that causes pain in specific points on the body, stiffness, and fatigue. But it doesnt have to keep you from doing what you enjoy. You can feel better. You and your healthcare provider can work together to develop a plan.  Take medications as directed  You may be given medications to help reduce pain and improve sleep.  Several medications are approved to treat fibromyalgia. Two were originally designed to treat depression. They are duloxetine, and milnacipran. A third, called pregaballin, was developed to treat nerve pain. Other medications include pain relievers such as acetaminophen or stronger narcotics. These may be prescribed short term.  NSAIDs (non-steroidal anti-inflammatory drugs) include aspirin, ibuprofen and naproxen are used to relieve pain.  Get exercise  Gentle exercise can help lessen your pain. Try these tips:  · Choose activities that are gentle on your joints, such as walking, biking, and swimming or other water exercises.  · Dont push yourself too hard. Build up your strength and endurance slowly, over time.  · Stick to it. For the most relief, exercise should become part of your daily life.  Get a good nights rest  To help you get more sleep, try the tips below:  · Sleep only in a bed, not on a couch or chair.  · Dont watch TV, read, or work in bed.  · Go to bed and get up at the same time each  day.  · Try to avoid naps.  · Avoid alcohol, caffeine, and tobacco for at least 3 hours before going to bed.  · Avoid fluids in the evening to avoid having to get up to urinate.  Other things you can do  No one knows what causes fibromyalgia. But stress, poor eating habits, and extra weight can make it worse. These tips may help you feel better:  · Eat a balanced diet with plenty of fruits and vegetables, whole grains, lean protein, and low-fat or nonfat dairy products.  · Maintain a healthy weight.  · Learn ways to reduce or manage the stress in your life.  · Ask your healthcare provider for resources to help you make changes. Or check out the resources below.  Resources  · Arthritis Foundation  www.arthritis.org  · National Fibromyalgia Association  www.fmaware.org  · American Fibromyalgia Syndrome Association  www.afsafund.org  Date Last Reviewed: 2/14/2016  © 6900-4925 [employer]. [address] [place] [address], [place] [address]. All rights reserved. This information is not intended as a substitute for professional medical care. Always follow your healthcare professional's instructions.

## 2017-10-17 NOTE — PROGRESS NOTES
I have personally interviewed and examined this patient and agree with resident's note as below.   Chiara Rodriguez MD

## 2017-10-18 ENCOUNTER — TELEPHONE (OUTPATIENT)
Dept: INTERNAL MEDICINE | Facility: CLINIC | Age: 52
End: 2017-10-18

## 2017-10-18 RX ORDER — GABAPENTIN 300 MG/1
CAPSULE ORAL
Qty: 60 CAPSULE | Refills: 1 | Status: SHIPPED | OUTPATIENT
Start: 2017-10-18 | End: 2017-12-12 | Stop reason: SDUPTHER

## 2017-10-18 NOTE — TELEPHONE ENCOUNTER
----- Message from Krista Adkins sent at 10/18/2017 12:01 PM CDT -----  Contact: Self [phone]  Pt states she seen Dr Mahan yesterday and was prescribed Lyrica 75 mg but she states it is to expensive and would like to know can it be changed ,please call

## 2017-10-18 NOTE — TELEPHONE ENCOUNTER
I have ordered for her a trial of Gabapentin until she sees her PCP. Please call her and tell her she can start Gabapentin 300 mg one at night for three days, then she can increase to 300 mg twice a day. Her PCP can increase the dose in the future when he sees her.

## 2017-11-03 ENCOUNTER — CLINICAL SUPPORT (OUTPATIENT)
Dept: REHABILITATION | Facility: HOSPITAL | Age: 52
End: 2017-11-03
Payer: MEDICAID

## 2017-11-03 DIAGNOSIS — R26.2 DIFFICULTY WALKING: Primary | ICD-10-CM

## 2017-11-03 DIAGNOSIS — R53.1 WEAKNESS: ICD-10-CM

## 2017-11-03 PROCEDURE — G8979 MOBILITY GOAL STATUS: HCPCS | Mod: CK

## 2017-11-03 PROCEDURE — 97162 PT EVAL MOD COMPLEX 30 MIN: CPT

## 2017-11-03 PROCEDURE — 97110 THERAPEUTIC EXERCISES: CPT

## 2017-11-03 PROCEDURE — G8978 MOBILITY CURRENT STATUS: HCPCS | Mod: CL

## 2017-11-03 NOTE — PLAN OF CARE
OUTPATIENT PHYSICAL THERAPY  PHYSICAL THERAPY EVALUATION    Name: Edita Hardin [facility]  Clinic Number: [id number]    Diagnosis:   Encounter Diagnoses   Name Primary?    Weakness     Difficulty walking      Physician: Ender Mahan August, *  Treatment Orders: PT Eval and Treat    History     Past Medical History:   Diagnosis Date    Cervical cancer 2014    GERD (gastroesophageal reflux disease)     Hiatal hernia 2014    Hypertension     Lactose intolerance     Osteoarthritis of back     Stroke 1972     Current Outpatient Prescriptions   Medication Sig    arm brace (NEOPRENE WRIST SPLINT SUPPORT) Misc 1 Units by Misc.(Non-Drug; Combo Route) route every evening.    conjugated estrogens (PREMARIN) vaginal cream Place 0.5 g vaginally twice a week.    cyclobenzaprine (FLEXERIL) 10 MG tablet TAKE 1 TABLET (10 MG TOTAL) BY MOUTH NIGHTLY AS NEEDED FOR MUSCLE SPASMS.    duloxetine (CYMBALTA) 60 MG capsule Take 1 capsule (60 mg total) by mouth once daily.    estradiol (ESTRACE) 1 MG tablet Take 1 mg by mouth once daily.    gabapentin (NEURONTIN) 300 MG capsule Take one 300 mg capsule once at night for three days, then increase to 300 mg twice a day.    lisinopril 10 MG tablet Take 1 tablet (10 mg total) by mouth once daily.    meclizine (ANTIVERT) 25 mg tablet Take 1 tablet (25 mg total) by mouth 3 (three) times daily as needed for Dizziness.    omeprazole (PRILOSEC) 40 MG capsule TAKE ONE CAPSULE BY MOUTH DAILY FOR STOMACH    sucralfate (CARAFATE) 1 gram tablet TAKE 1 TABLET (1 G TOTAL) BY MOUTH 2 (TWO) TIMES DAILY AS NEEDED.     No current facility-administered medications for this visit.      Review of patient's allergies indicates:   Allergen Reactions    Bee sting [allergen ext-venom-honey bee]      Rash      Grass pollen-bermuda, standard      rash     Nutrition:  Overweight  Mental status :confused    Precautions: standard; fall risk    Evaluation Date: 11/3/17  Visit # authorized:  1/13  Authorization period: 12/31/17  Plan of care Expiration: 1/12/17    MD referral: Ender Mahan, *    Subjective   PSH: 2015: cervical CA excision (removed ovaries); 2017: gall stone excision    PLOF:light cooking/household tasks; lives in an apartment with     Occupation: Pt is unable to work; not deemed disabled    Primary concern/ Chief complaints:    Edita is a 52 y.o. female that presents to [facility] secondary to fibromyalgia. Injury/surgery occurred approximately: 2015. Pt. presents with the following co-morbidities and personal factors that directly impact her plan of care: pain tolerance, decreased exercise ability, and sedentary lifestyle.   X-ray/MRI was taken and revealed no results listed; arthritis per pt. report. Pt reports numbness and tingling right hand and bilateral LEs (from feet to knees).     Onset/JENNIFER: gradual: started experiencing pain in 2015 had therapy with some improvement. Pt. has gradually declined over the past year. Pt. had a fall last week when she reached out of the bed to the answer the phone. Pt. rolled OOB landing onto her left side. Pt.'s current symptoms include bilateral diffuse leg pain, entire spine lumbar spine being greater than thoracic, and balance deficit/fall risk.    Previous treatment: 2015: PT    Pain Scale: Edita rates pain on a scale of 0-10 to be 9 at worst; 9 currently; 8 at best .    Aggravating Factors: a lot of physical activity, sleep disturbance, moderate activities, lifting/carrying objects, work (unable to perform), community ambulation, grooming/dressing, and prolonged sitting  Relieving Factors: gabapentin, flexiril, exercises    ADLs: Pt has a decreased ability to perform ADLs such as see above.    Patient Goals: Pt would like to decrease pain and increase function so she can return to her normal daily activities: with increased strength, endurance, decreased risk of falls, and manageable  symptoms    Objective     Posture Alignment :hyper lumbar lordosis    Sensation: Light Touch: Intact: bilateral UEs and LEs    ROM:   Lumbar flexion and SB bilaterally: WFL     UPPER EXTREMITIES: AROM/PROM: (measured in degrees):   · (R) UE: limited as follows: flexion: 130 deg.; abduction: 110 deg.   · (L) UE: limited as follows: 130 deg.; abduction: 110 deg.           LOWER EXTREMITIES: AROM/PROM: (measured in degrees)  ·  (R) LE: limited as follows:  knee extension: -10 from neutral  · Hamstrings Length: 65 degrees  · Ankle Dorsiflexion:   0 degrees  ·  (L) LE: limited as follows: knee extension: -10 from neutral  · Hamstrings Length: 65 degrees  · Ankle Dorsiflexion: 0 degrees    Upper Extremity Strength: (graded 1-5 out of 5)    RUE LUE   Shoulder flexion: 3+/5 3+/5   Shoulder Abduction: 3+/5 3+/5   Elbow flexion: 4+/5 4+/5   Elbow extension: 4+/5 4+/5   Wrist flexion: 4-/5 4-/5   Wrist extension: 4-/5 4-/5    gross assessment 4+/5 4+/5     Lower Extremity Strength: (graded 1-5 out of 5)    RLE LLE   Hip flexion:  4-/5 4-/5   Hip IR 4-/5 4-/5   Hip ER 4-/5 4-/5   Knee extension: 4+/5 4+/5   Great toe extension 3-/5 3-/5   Ankle DF: 4+/5 4+/5   Posterior fibers of Gluteus medius 3+/5 4-/5   PF 4+/5 4+/5   Knee Flexion 4-/5 4+/5   Hip extension 3/5 3/5     Special Tests  SLR: bilateral 70 deg.    GAIT: Edita ambulates 100 feet with no assistive device with supervision. Pt. ambulates with antalgia: decreased stride/step length, decreased stance time left LE, decreased TKE at heel strike, and slightly unsteady.    TREATMENT:  Therapeutic exercise: Edita received therapeutic exercises to develop strength and endurance, flexibility for 10 minutes including:UHHSGLE: bilateral piriformis stretch, modified push/pull, and sciatic nerve glide  Functional Status Measures:    Intake Score     Pts Physical FS Primary Measure      26                          Risk Adjustment Statistical FOTO     40        PT reviewed  FOTO scores for Edita Hardin [facility] on 11/03/2017.   FOTO scores were entered into [employer] - see media section.    History  Co-morbidities and personal factors that may impact the plan of care Examination  Body Structures and Functions, activity limitations and participation restrictions that may impact the plan of care Clinical Presentation   Decision Making/ Complexity Score   Co-morbidities:   stroke, lumbar DDD, and deconditioned            Personal Factors:   low pain tolerance Body Regions: bilateral shoulders, hips, knees, and lumbar/SIJ    Body Systems: Decreased bilateral shoulders/hips/Lumbar spine AROM; pelvic dysfunction; shoulder core hip lumbopelvic weakness; poor posture; pain with transitional activities, decrease exercise ability, and pain with ADLs.     Activity limitations: community ambulation, prolonged sitting/standing, lifting, stooping/bending, household activities/cooking, stair negotiation, and sleep disturbance    Participation Restrictions: social activities and ability to work evolving with changing clinical characteristics   moderate     Clinical Presentation/complexity category  Moderate complexity category: pt. has 1-2 personal factors and/or co-morbidities directly  impacting POC, 3 or more body system impairments/functional limitations/participation restrictions; as well as, condition is evolving with changing clinical characteristics.    Pt. Education: Pt/family educational information was provided, including: role of PT, goals for PT, scheduling - pt verbalized understanding. Instructed pt. regarding: proper form/technique with all exercises. Pt demo good understanding of the education provided. Edita demonstrated good return demonstration of activities.  · Pt has no cultural, educational or language barriers to learning provided.    Medical necessity is demonstrated by the following IMPAIRMENTS/PROBLEM LIST:   1) Pain limiting function   2) Posture dysfunction   3)  Shoulder/Core/Lumbar/LE weakness   4) Decreased thoracic/lumbar joint mobility   5) Decreased Shoulder/Lumbar ROM   6) Decreased soft tissue extensibility/fascia restriction   7) Decreased LE flexibility: bilateral piriformis and hamstrings   8) Lack of HEP   9) LE paresthesia bilateral LEs (more than likely neuropathy)   10) pelvis dysfunction    GOALS:   Short Term Goals:  4 weeks  1. Report decreased lumbar pain </= 5/10 at worst to increase tolerance for prolonged sitting/standing  2. Pt. to demonstrate proper cervical and scapula retraction requiring min. to no verbal cues from PT  3. Pt. to be independent with symptom management/pelvis correction  4. Pt to tolerate HEP to improve ROM and independence with ADL's    Long Term Goals: 8 weeks  1. Report decreased lumbar pain </=  2/10 at worst to increase tolerance for prolonged sitting/standing  2. Pt. to demonstrate proper cervical and scapula retraction requiring no verbal cues from PT  3. Pt. to demonstrate increased shoulder elevation to 160 deg. to improve ability to reach/lift OH  4. Pt. to demonstrate increased MMT for core/lumbar paraspinals to 3/5 to increase endurance with prolonged sitting.   5. Pt. to demonstrate increased MMT for bilateral gluteus medius to 4/5 to increase stability during ambulation on uneven surfaces.  6. Pt. to demonstrate Increased MMT for bilateral hip flexor to 4/5 to increase tolerance for ADL and work activities.   7. Pt to be independent with HEP to improve ROM and independence with ADL's  8. Pt. to report a decrease in bilateral LE paresthesia by >/= 50% to improve ability to perform community ambulation  Assessment     This is a 52 y.o. female referred to outpatient physical therapy who presents with a medical diagnosis of fibromyalgia and PT diagnosis of weakness and difficulty walking demonstrating joint dysfunction and functional limitation as described above. Level of complexity is moderate difficulty;  based on  patient's past medical history including the above co-morbidities and personal factors; functional limitations, and clinical presentation directly impacting his/her plan of care.    The goals were discussed with the patient and she is in agreement with proposed treatment plan. Pt was given a HEP: see above. Pt verbally understood instructions and demonstrated proper form/ technique. Pt was advised to perform these exercises free of pain, and to discontinue use if symptoms persist/worsen. Pt will benefit from physical therapy services in order to maximize pain free functional independence.     Plan     Outpatient physical therapy 1- 2 times weekly to include: pt education, HEP, therapeutic exercises, therapeutic activities, neuromuscular re-education/ balance exercises, soft tissue and joint mobilizations; aquatic therapy with transition to land based regimen after reassessment, and modalities prn.     Cont PT for  8 weeks. Pt may be seen by PTA as part of the rehabilitation team.     I certify the need for these services furnished under this plan of treatment and while under my care.  ____________________________________ Physician/Referring Practitioner   Date of Signature

## 2017-11-06 ENCOUNTER — CLINICAL SUPPORT (OUTPATIENT)
Dept: REHABILITATION | Facility: HOSPITAL | Age: 52
End: 2017-11-06
Payer: MEDICAID

## 2017-11-06 DIAGNOSIS — N95.2 VAGINAL ATROPHY: ICD-10-CM

## 2017-11-06 DIAGNOSIS — R26.2 DIFFICULTY WALKING: ICD-10-CM

## 2017-11-06 DIAGNOSIS — R53.1 WEAKNESS: ICD-10-CM

## 2017-11-06 PROCEDURE — 97110 THERAPEUTIC EXERCISES: CPT

## 2017-11-06 RX ORDER — ESTRADIOL 1 MG/1
1 TABLET ORAL DAILY
Qty: 90 TABLET | Refills: 0 | OUTPATIENT
Start: 2017-11-06

## 2017-11-06 NOTE — PROGRESS NOTES
Aquatic Progress Note  Time In: 1620  Time Out: 1720    Subjective  Pt reports to tx today stating that she continues to have pain and weakness of UE and LE bilaterally.  Reports compliant with HEP.    Pt pain level(0-10 pain scale, 0 being no pain and 10 being the worst): Pre-tx  8/10.  Post-tx    7/10               Objective    Treatment: Pt was instructed in and performed therapeutic exercises to develop  for 60 minutes. Patient performed therapeutic exercises consisting of the following exercises:    Warm-up Laps 2 x each  fwd/bkw/lat     Stretches: 2 x 30 sec  HS  Quad     LE exs: 10x each with vc  Mini Squats with QS  Heel Raise with GS  HS curl  Hip flx/ext  Hip flex/LAQ  Hip abd/add  Lunges fwd/lat       UE exs: 15x each with vc  Shld flex/ext apo   Shld abd/add apo  Lat pull ytb  Horiz Row rtc     Endurance: 2 min  Marching    Cool down laps 1 x each  fwd/bkw/lat      Patient was not issued HEP for pool.      Assessment  Pt's tolerated initial aquatic tx well w/ no increase in symptoms. Tx focused on BLE flexibility/strengthening and hip/core stabilization. Pt needed vc throughout all ex's. Will progress as tolerated.  Patient will continue to benefit from skilled PT intervention.    Edita Inez Rosemary is making good progress towards established goals.    Anticipated barriers to physical therapy: None    GOALS:   Short Term Goals:  4 weeks  1. Report decreased lumbar pain </= 5/10 at worst to increase tolerance for prolonged sitting/standing  2. Pt. to demonstrate proper cervical and scapula retraction requiring min. to no verbal cues from PT  3. Pt. to be independent with symptom management/pelvis correction  4. Pt to tolerate HEP to improve ROM and independence with ADL's     Long Term Goals: 8 weeks  1. Report decreased lumbar pain </=  2/10 at worst to increase tolerance for prolonged sitting/standing  2. Pt. to demonstrate proper cervical and scapula retraction requiring no verbal cues  from PT  3. Pt. to demonstrate increased shoulder elevation to 160 deg. to improve ability to reach/lift OH  4. Pt. to demonstrate increased MMT for core/lumbar paraspinals to 3/5 to increase endurance with prolonged sitting.   5. Pt. to demonstrate increased MMT for bilateral gluteus medius to 4/5 to increase stability during ambulation on uneven surfaces.  6. Pt. to demonstrate Increased MMT for bilateral hip flexor to 4/5 to increase tolerance for ADL and work activities.   7. Pt to be independent with HEP to improve ROM and independence with ADL's  8. Pt. to report a decrease in bilateral LE paresthesia by >/= 50% to improve ability to perform community ambulation      This is a 52 y.o. female referred to outpatient physical therapy who presents with a medical diagnosis of fibromyalgia and PT diagnosis of weakness and difficulty walking demonstrating joint dysfunction and functional limitation as described above. Level of complexity is moderate difficulty;  based on patient's past medical history including the above co-morbidities and personal factors; functional limitations, and clinical presentation directly impacting his/her plan of care.     The goals were discussed with the patient and she is in agreement with proposed treatment plan. Pt was given a HEP: see above. Pt verbally understood instructions and demonstrated proper form/ technique. Pt was advised to perform these exercises free of pain, and to discontinue use if symptoms persist/worsen. Pt will benefit from physical therapy services in order to maximize pain free functional independence.     Plan  Outpatient physical therapy 1- 2 times weekly to include: pt education, HEP, therapeutic exercises, therapeutic activities, neuromuscular re-education/ balance exercises, soft tissue and joint mobilizations; aquatic therapy with transition to land based regimen after reassessment, and modalities prn.      Cont PT for  8 weeks. Pt may be seen by PTA as  part of the rehabilitation team.

## 2017-11-10 ENCOUNTER — CLINICAL SUPPORT (OUTPATIENT)
Dept: REHABILITATION | Facility: HOSPITAL | Age: 52
End: 2017-11-10
Payer: MEDICAID

## 2017-11-10 DIAGNOSIS — R53.1 WEAKNESS: Primary | ICD-10-CM

## 2017-11-10 PROCEDURE — 97113 AQUATIC THERAPY/EXERCISES: CPT

## 2017-11-10 NOTE — PROGRESS NOTES
"                                                                                                      Aquatic Progress Note      Total treatment time: 60    Time In: 3:55  Time Out: 5:00      Subjective  Edita states "that her pn is 8/10 upon arrival today, 6/10 post tx, she reports no increase in sx post last tx.      Objective    Treatment: Edita was instructed in and performed therapeutic exercises to develop strength, endurance, ROM, flexibility, balance, posture and core stabilization for 60 minutes, progressing with reps on this date. Patient performed therapeutic exercises consisting of warm up laps without resistance, PROM/Stretching, UE strengthening, LE strengthening, balance exercises, isometrics, Endurance, march, theraband and cool down.      Warm-up Laps 3 x each  fwd/bkw/lat     Stretches: 3 x 30 sec  HS  Quad     LE exs: 12x each with vc  Mini Squats with QS  Heel Raise with GS  HS curl  Hip flx/ext  LAQ  Hip abd/add  Lunges fwd/lat        UE exs: 15x each with vc  Shld flex/ext    Shld abd/add   Lat pull ytb  Horiz Row ytc     Endurance: 3 min  Marching     Cool down laps 1 x each  fwd/bkw/lat   Patient was not issued HEP for pool.      Assessment  Patient's tolerance to treatment with progression of ther ex was good with benefit of decreased pn post tx as above. This is a 52 y.o. female referred to outpatient physical therapy who presents with a medical diagnosis of fibromyalgia and PT diagnosis of weakness and difficulty walking demonstrating joint dysfunction and functional limitation as described above. Level of complexity is moderate difficulty;  based on patient's past medical history including the above co-morbidities and personal factors; functional limitations, and clinical presentation directly impacting his/her plan of care.  Patient will continue to benefit from skilled PT intervention.    Edita is making good progress towards established goals.      Plan  Continue 2x per " week.

## 2017-11-13 ENCOUNTER — CLINICAL SUPPORT (OUTPATIENT)
Dept: REHABILITATION | Facility: HOSPITAL | Age: 52
End: 2017-11-13
Payer: MEDICAID

## 2017-11-13 DIAGNOSIS — R53.1 WEAKNESS: ICD-10-CM

## 2017-11-13 DIAGNOSIS — R26.2 DIFFICULTY WALKING: ICD-10-CM

## 2017-11-13 PROCEDURE — 97110 THERAPEUTIC EXERCISES: CPT

## 2017-11-13 NOTE — PROGRESS NOTES
Aquatic Progress Note      Total treatment time: 60    Time In: 4:30  Time Out: 5:30      Subjective  Edita states that her pn is 8/10 upon arrival today, 6/10 post tx, she reports no increase in sx post last tx.       Objective    Treatment: Edita was instructed in and performed therapeutic exercises to develop strength, endurance, ROM, flexibility, balance, posture and core stabilization for 60 minutes, progressing with reps on this date. Patient performed therapeutic exercises consisting of warm up laps without resistance, PROM/Stretching, UE strengthening, LE strengthening, balance exercises, isometrics, Endurance, march, theraband and cool down.      Warm-up Laps 3 x each  fwd/bkw/lat     Stretches: 3 x 30 sec  HS  Quad     LE exs: 15x each with vc  Mini Squats with QS  Heel Raise with GS  HS curl  Hip flx/ext  LAQ  Hip abd/add  Lunges fwd/lat         UE exs: 20x each with vc  Shld flex/ext    Shld abd/add   Lat pull ytb  Horiz Row ytc     Endurance: 3 min  Marching     Cool down laps 1 x each  Fwd/bkw/lat    Patient was not issued HEP for pool.      Assessment  Patient's tolerance to treatment with progression of ther ex was good with benefit of decreased pn post tx as above. This is a 52 y.o. female referred to outpatient physical therapy who presents with a medical diagnosis of fibromyalgia and PT diagnosis of weakness and difficulty walking demonstrating joint dysfunction and functional limitation as described above. Level of complexity is moderate difficulty;  based on patient's past medical history including the above co-morbidities and personal factors; functional limitations, and clinical presentation directly impacting his/her plan of care.  Patient will continue to benefit from skilled PT intervention.    Edita is making good progress towards established goals.      Plan  Continue 2x per  week.

## 2017-11-15 ENCOUNTER — TELEPHONE (OUTPATIENT)
Dept: INTERNAL MEDICINE | Facility: CLINIC | Age: 52
End: 2017-11-15

## 2017-11-16 ENCOUNTER — OFFICE VISIT (OUTPATIENT)
Dept: INTERNAL MEDICINE | Facility: CLINIC | Age: 52
End: 2017-11-16
Payer: MEDICAID

## 2017-11-16 ENCOUNTER — LAB VISIT (OUTPATIENT)
Dept: LAB | Facility: HOSPITAL | Age: 52
End: 2017-11-16
Payer: MEDICAID

## 2017-11-16 VITALS
BODY MASS INDEX: 35.33 KG/M2 | SYSTOLIC BLOOD PRESSURE: 140 MMHG | HEART RATE: 93 BPM | HEIGHT: 67 IN | WEIGHT: 225.06 LBS | DIASTOLIC BLOOD PRESSURE: 75 MMHG

## 2017-11-16 DIAGNOSIS — F51.01 PRIMARY INSOMNIA: ICD-10-CM

## 2017-11-16 DIAGNOSIS — E66.9 OBESITY, CLASS II, BMI 35-39.9: ICD-10-CM

## 2017-11-16 DIAGNOSIS — K21.00 GASTROESOPHAGEAL REFLUX DISEASE WITH ESOPHAGITIS: ICD-10-CM

## 2017-11-16 DIAGNOSIS — R42 VERTIGO: ICD-10-CM

## 2017-11-16 DIAGNOSIS — E66.9 OBESITY, CLASS II, BMI 35-39.9: Primary | ICD-10-CM

## 2017-11-16 DIAGNOSIS — Z00.00 HEALTHCARE MAINTENANCE: ICD-10-CM

## 2017-11-16 LAB
25(OH)D3+25(OH)D2 SERPL-MCNC: 9 NG/ML
CHOLEST SERPL-MCNC: 236 MG/DL
CHOLEST/HDLC SERPL: 3 {RATIO}
ESTIMATED AVG GLUCOSE: 114 MG/DL
HBA1C MFR BLD HPLC: 5.6 %
HDLC SERPL-MCNC: 80 MG/DL
HDLC SERPL: 33.9 %
LDLC SERPL CALC-MCNC: 109.4 MG/DL
NONHDLC SERPL-MCNC: 156 MG/DL
T4 FREE SERPL-MCNC: 0.91 NG/DL
TRIGL SERPL-MCNC: 233 MG/DL
TSH SERPL DL<=0.005 MIU/L-ACNC: 0.87 UIU/ML

## 2017-11-16 PROCEDURE — 84439 ASSAY OF FREE THYROXINE: CPT

## 2017-11-16 PROCEDURE — 99213 OFFICE O/P EST LOW 20 MIN: CPT | Mod: PBBFAC | Performed by: STUDENT IN AN ORGANIZED HEALTH CARE EDUCATION/TRAINING PROGRAM

## 2017-11-16 PROCEDURE — 99214 OFFICE O/P EST MOD 30 MIN: CPT | Mod: S$PBB,,, | Performed by: STUDENT IN AN ORGANIZED HEALTH CARE EDUCATION/TRAINING PROGRAM

## 2017-11-16 PROCEDURE — 36415 COLL VENOUS BLD VENIPUNCTURE: CPT

## 2017-11-16 PROCEDURE — 99999 PR PBB SHADOW E&M-EST. PATIENT-LVL III: CPT | Mod: PBBFAC,,, | Performed by: STUDENT IN AN ORGANIZED HEALTH CARE EDUCATION/TRAINING PROGRAM

## 2017-11-16 PROCEDURE — 84443 ASSAY THYROID STIM HORMONE: CPT

## 2017-11-16 PROCEDURE — 82306 VITAMIN D 25 HYDROXY: CPT

## 2017-11-16 PROCEDURE — 83036 HEMOGLOBIN GLYCOSYLATED A1C: CPT

## 2017-11-16 PROCEDURE — 80061 LIPID PANEL: CPT

## 2017-11-16 RX ORDER — MECLIZINE HYDROCHLORIDE 25 MG/1
25 TABLET ORAL 3 TIMES DAILY PRN
Qty: 30 TABLET | Refills: 6 | Status: SHIPPED | OUTPATIENT
Start: 2017-11-16 | End: 2019-08-22 | Stop reason: SDUPTHER

## 2017-11-16 RX ORDER — OMEPRAZOLE 40 MG/1
40 CAPSULE, DELAYED RELEASE ORAL DAILY
Qty: 90 CAPSULE | Refills: 3 | Status: SHIPPED | OUTPATIENT
Start: 2017-11-16 | End: 2018-10-18 | Stop reason: SDUPTHER

## 2017-11-16 RX ORDER — TRAZODONE HYDROCHLORIDE 50 MG/1
50 TABLET ORAL NIGHTLY
Qty: 90 TABLET | Refills: 3 | Status: SHIPPED | OUTPATIENT
Start: 2017-11-16 | End: 2018-10-18 | Stop reason: SDUPTHER

## 2017-11-16 RX ORDER — SUCRALFATE 1 G/1
1 TABLET ORAL 2 TIMES DAILY
Qty: 180 TABLET | Refills: 3 | Status: SHIPPED | OUTPATIENT
Start: 2017-11-16 | End: 2018-11-16

## 2017-11-16 RX ORDER — ESTRADIOL 1 MG/1
1 TABLET ORAL DAILY
Qty: 90 TABLET | Refills: 3 | OUTPATIENT
Start: 2017-11-16

## 2017-11-16 NOTE — PROGRESS NOTES
Subjective:       Patient ID: Edita Riley is a 52 y.o. female.    Chief Complaint: Annual Exam    52AAF presents for 6mo follow up of chronic medical problems.    Fibromyalgia: Symptoms improving with physical therapy. Presently engaging in pool exercises several days per week.   Obesity: 5+lbs weight loss since her last visit with PCP.  Hiatal hernia, GERD: Evaluated by GI who performed esophagram 10/11/17 with findings: (1)Minimal esophageal dysmotility (2)No reflux identified during the examination.   Her acid reflux symptoms are presently controlled on daily omeprazole, Carafate. She requests refills.  HTN: 140/75 today. Patient is not monitoring at home. Reports compliance with lisinopril 10mg daily.  Healthcare maintenance: Influenzae vaccine is due.      Review of Systems   Constitutional: Negative for chills, fatigue and fever.   HENT: Negative for congestion and rhinorrhea.    Eyes: Negative for pain and redness.   Respiratory: Negative for cough, chest tightness, shortness of breath and wheezing.    Cardiovascular: Negative for chest pain, palpitations and leg swelling.   Gastrointestinal: Negative for abdominal pain, constipation, diarrhea, nausea and vomiting.   Endocrine: Negative for cold intolerance, heat intolerance, polydipsia and polyuria.   Genitourinary: Negative for dysuria and hematuria.   Musculoskeletal: Positive for arthralgias and myalgias.   Allergic/Immunologic: Negative for environmental allergies, food allergies and immunocompromised state.   Neurological: Positive for weakness. Negative for dizziness, light-headedness and headaches.   Hematological: Negative for adenopathy. Does not bruise/bleed easily.   Psychiatric/Behavioral: Negative for agitation and confusion.       Objective:      Physical Exam   Constitutional: She is oriented to person, place, and time. No distress.   -American woman whose Body mass index is 37.33 kg/(m^2).    HENT:   Head: Atraumatic.    Right Ear: Tympanic membrane normal.   Left Ear: Tympanic membrane normal.   Mouth/Throat: Oropharynx is clear and moist. No oropharyngeal exudate.   Eyes: Pupils are equal, round, and reactive to light. Right eye exhibits no discharge. Left eye exhibits no discharge.   Neck: Normal range of motion. No thyromegaly present.   Cardiovascular: Normal rate, regular rhythm and normal heart sounds.    Pulmonary/Chest: Effort normal and breath sounds normal. No stridor. She has no wheezes. She has no rales.   Abdominal: Soft. She exhibits no distension and no mass. There is no hepatosplenomegaly. There is no tenderness. There is no guarding and negative Kendrick's sign.   Musculoskeletal: She exhibits tenderness (to deep palpation throughout b/L leg muscles). She exhibits no edema.   Lymphadenopathy:     She has no cervical adenopathy.   Neurological: She is alert and oriented to person, place, and time.   left sided facial palsy   Skin: Skin is warm and dry. No rash noted.   Psychiatric: She has a normal mood and affect. Her behavior is normal.   Nursing note and vitals reviewed.      Assessment:       1. Obesity, Class II, BMI 35-39.9    2. Vertigo    3. Gastroesophageal reflux disease with esophagitis    4. Healthcare maintenance    5. Primary insomnia        Plan:       Edita was seen today for annual exam.    - PMH, PSH, Allergies, Medications, SH, FH reviewed and updated in medical record.   - Healthcare maintenance brought up to date as detailed below  - Serum studies sent for obesity related risk factors  - Refill prescriptions provided  - Encouraged patient to continue active lifestyle in support of weight loss  - RTC 6mo for routine follow up     Diagnoses and all orders for this visit:    Obesity, Class II, BMI 35-39.9  -     Lipid panel; Future  -     Hemoglobin A1c; Future  -     TSH; Future  -     VITAMIN D; Future  -     T4, free; Future    Vertigo  -     meclizine (ANTIVERT) 25 mg tablet; Take 1 tablet  (25 mg total) by mouth 3 (three) times daily as needed for Dizziness.    Gastroesophageal reflux disease with esophagitis  -     omeprazole (PRILOSEC) 40 MG capsule; Take 1 capsule (40 mg total) by mouth once daily.  -     sucralfate (CARAFATE) 1 gram tablet; Take 1 tablet (1 g total) by mouth 2 (two) times daily.    Healthcare maintenance  -     Lipid panel; Future  -     Hemoglobin A1c; Future  -     TSH; Future  -     VITAMIN D; Future  -     T4, free; Future    Primary insomnia  -     traZODone (DESYREL) 50 MG tablet; Take 1 tablet (50 mg total) by mouth every evening.

## 2017-11-16 NOTE — TELEPHONE ENCOUNTER
The patient is already on topical estrogen treatment from gynecology.  I recommend she discuss this with gynecology to determine if it is appropriate to be taking oral estrogen supplementation as well.

## 2017-11-17 ENCOUNTER — CLINICAL SUPPORT (OUTPATIENT)
Dept: REHABILITATION | Facility: HOSPITAL | Age: 52
End: 2017-11-17
Payer: MEDICAID

## 2017-11-17 ENCOUNTER — TELEPHONE (OUTPATIENT)
Dept: INTERNAL MEDICINE | Facility: CLINIC | Age: 52
End: 2017-11-17

## 2017-11-17 DIAGNOSIS — R26.2 DIFFICULTY WALKING: Primary | ICD-10-CM

## 2017-11-17 PROCEDURE — 97113 AQUATIC THERAPY/EXERCISES: CPT

## 2017-11-17 NOTE — PROGRESS NOTES
"                                                                                                      Aquatic Progress Note      Total treatment time: 60    Time In: 3:00  Time Out: 4:00      Subjective  Edita states that her pn is "round about a 7 in my legs", she reports no increase in sx post last tx.       Objective    Treatment: Edita was instructed in and performed therapeutic exercises to develop strength, endurance, ROM, flexibility, balance, posture and core stabilization for 60 minutes, progressing with reps on this date. Patient performed therapeutic exercises consisting of warm up laps without resistance, PROM/Stretching, UE strengthening, LE strengthening, balance exercises, isometrics, Endurance, march, theraband and cool down.      Warm-up Laps 3 x each  fwd/bkw/lat     Stretches: 3 x 30 sec  HS  Quad     LE exs: 20x each with vc  Mini Squats with QS  Heel Raise with GS  HS curl  Hip flx/ext  LAQ  Hip abd/add  Lunges fwd/lat         UE exs: 20x each with vc  Shld flex/ext    Shld abd/add   Lat pull ytb  Horiz Row ytc     Endurance: 4 min  Marching     Cool down laps 1 x each  Fwd/bkw/lat    Patient was not issued HEP for pool.      Assessment  Patient's tolerance to treatment with progression of ther ex was good w/o c/o pn throughout tx and no increase in sx post tx. This is a 52 y.o. female referred to outpatient physical therapy who presents with a medical diagnosis of fibromyalgia and PT diagnosis of weakness and difficulty walking demonstrating joint dysfunction and functional limitation as described above. Level of complexity is moderate difficulty;  based on patient's past medical history including the above co-morbidities and personal factors; functional limitations, and clinical presentation directly impacting his/her plan of care.  Patient will continue to benefit from skilled PT intervention.    Edita is making good progress towards established goals.      Plan  Continue 2x per " week.

## 2017-11-20 ENCOUNTER — CLINICAL SUPPORT (OUTPATIENT)
Dept: REHABILITATION | Facility: HOSPITAL | Age: 52
End: 2017-11-20
Payer: MEDICAID

## 2017-11-20 DIAGNOSIS — R53.1 WEAKNESS: ICD-10-CM

## 2017-11-20 DIAGNOSIS — R26.2 DIFFICULTY WALKING: ICD-10-CM

## 2017-11-20 PROCEDURE — 97110 THERAPEUTIC EXERCISES: CPT

## 2017-11-20 RX ORDER — ESTRADIOL 1 MG/1
1 TABLET ORAL DAILY
Qty: 90 TABLET | Refills: 0 | OUTPATIENT
Start: 2017-11-20

## 2017-11-20 NOTE — PROGRESS NOTES
Teaching Statement  I have personally taken the history and examined this patient and agree with the resident's history, exam and assessment and plan as stated above.  Zbigniew Goldstein MD

## 2017-11-20 NOTE — PROGRESS NOTES
Aquatic Progress Note      Total treatment time: 60    Time In: 4:45  Time Out: 5:45      Subjective  Edita reported 6/10 pain down BLE upon arrival and reports no 5/10 BLE pain post tx session.       Objective    Treatment: Edita was instructed in and performed therapeutic exercises to develop strength, endurance, ROM, flexibility, balance, posture and core stabilization for 60 minutes, progressing with reps on this date. Patient performed therapeutic exercises consisting of warm up laps without resistance, PROM/Stretching, UE strengthening, LE strengthening, balance exercises, isometrics, Endurance, march, theraband and cool down.      Warm-up Laps 3 x each  fwd/bkw/lat     Stretches: 3 x 30 sec  HS  Quad     LE exs: 20x each with vc  Mini Squats with QS  Heel Raise with GS  HS curl  Hip flx/ext  LAQ  Hip abd/add  Lunges fwd/lat         UE exs: 20x each with vc  Shld flex/ext    Shld abd/add   Lat pull ytb  Horiz Row ytc     Endurance: 4 min  Marching     Cool down laps 1 x each  Fwd/bkw/lat    Patient was not issued HEP for pool.      Assessment  Patient's tolerance to treatment was good as pt reported a decrease in BLE pain post aquatic session. This is a 52 y.o. female referred to outpatient physical therapy who presents with a medical diagnosis of fibromyalgia and PT diagnosis of weakness and difficulty walking demonstrating joint dysfunction and functional limitation as described above. Level of complexity is moderate difficulty;  based on patient's past medical history including the above co-morbidities and personal factors; functional limitations, and clinical presentation directly impacting his/her plan of care.  Patient will continue to benefit from skilled PT intervention.    Edita is making good progress towards established goals.      Plan  Continue 2x per week.

## 2017-11-28 ENCOUNTER — CLINICAL SUPPORT (OUTPATIENT)
Dept: REHABILITATION | Facility: HOSPITAL | Age: 52
End: 2017-11-28
Payer: MEDICAID

## 2017-11-29 ENCOUNTER — CLINICAL SUPPORT (OUTPATIENT)
Dept: REHABILITATION | Facility: HOSPITAL | Age: 52
End: 2017-11-29
Payer: MEDICAID

## 2017-11-29 PROCEDURE — 97110 THERAPEUTIC EXERCISES: CPT

## 2017-11-29 NOTE — PROGRESS NOTES
Physical Therapy Reassessment      Total treatment time: 60    Time In: 4:15  Time Out: 5  30 min. 1:1    Subjective  Edita reports increased back, sides, and feet pain. Notes some relief of knees in the pool, but not back and feet. Notes compliance with HEP.      Objective  UPPER EXTREMITIES: AROM/PROM: (measured in degrees):   · (R) UE: limited as follows: flexion: 130 deg.; abduction: 130 deg.   · (L) UE: limited as follows: 130 deg.; abduction: 130 deg.            LOWER EXTREMITIES: AROM/PROM: (measured in degrees)  ·  (R) LE: limited as follows:  knee extension: -10 from neutral  ? Hamstrings Length: 65 degrees  ? Ankle Dorsiflexion:   0 degrees  ·  (L) LE: limited as follows: knee extension: -10 from neutral  ? Hamstrings Length: 65 degrees  ? Ankle Dorsiflexion: 0 degrees     Upper Extremity Strength: (graded 1-5 out of 5)     RUE LUE   Shoulder flexion: 3+/5 3+/5   Shoulder Abduction: 3+/5 3+/5   Elbow flexion: 4+/5 4+/5   Elbow extension: 4+/5 4+/5   Wrist flexion: 4/5 4/5   Wrist extension: 4/5 4/5    gross assessment 5/5 5/5      Lower Extremity Strength: (graded 1-5 out of 5)     RLE LLE   Hip flexion:  4-/5 4-/5   Hip IR 4/5 4/5   Hip ER 4/5 4/5   Knee extension: 4+/5 4+/5   Great toe extension 3-/5 3-/5   Ankle DF: 4+/5 4+/5   Posterior fibers of Gluteus medius 4-/5 4-/5   PF 4+/5 4+/5   Knee Flexion 4+/5 4+/5   Hip extension 3/5 3/5      Special Tests  SLR: bilateral 70 deg.    Treatment: Edita was instructed in and performed therapeutic exercises to develop strength, endurance, ROM, flexibility, balance, posture and core stabilization for 60 minutes, progressing with reps on this date.   Supine  · piriformis stretch: 3x30 sec. bilateral  · modified push/pull  · sciatic nerve glide: 3x10  · TA brace: 2x8    Assessment  Patient had some improved LE strength since starting therapy. Pt. notes compliance with HEP and has some relief. Pt. agreed with transition to land based regimen. Pt. will  schedule remaining visits on land.Will continue to progress as casandra.    This is a 52 y.o. female referred to outpatient physical therapy who presents with a medical diagnosis of fibromyalgia and PT diagnosis of weakness and difficulty walking demonstrating joint dysfunction and functional limitation as described above. Level of complexity is moderate difficulty;  based on patient's past medical history including the above co-morbidities and personal factors; functional limitations, and clinical presentation directly impacting his/her plan of care.  Patient will continue to benefit from skilled PT intervention.    Edita is making good progress towards established goals.      Plan  Continue 2x per week.

## 2017-12-06 ENCOUNTER — CLINICAL SUPPORT (OUTPATIENT)
Dept: REHABILITATION | Facility: HOSPITAL | Age: 52
End: 2017-12-06
Payer: MEDICAID

## 2017-12-06 PROCEDURE — 97110 THERAPEUTIC EXERCISES: CPT

## 2017-12-08 ENCOUNTER — CLINICAL SUPPORT (OUTPATIENT)
Dept: REHABILITATION | Facility: HOSPITAL | Age: 52
End: 2017-12-08
Payer: MEDICAID

## 2017-12-08 DIAGNOSIS — R26.2 DIFFICULTY WALKING: ICD-10-CM

## 2017-12-08 DIAGNOSIS — R53.1 WEAKNESS: Primary | ICD-10-CM

## 2017-12-08 PROCEDURE — 97110 THERAPEUTIC EXERCISES: CPT

## 2017-12-08 NOTE — PROGRESS NOTES
"Physical Therapy Progress Note    Evaluation Date: 11/3/17  Visit # authorized: 1/13  Authorization period: 12/31/17  Plan of care Expiration: 1/12/17  Time In: 3:00  Time Out: 3:50  50 min. 1:1    Subjective  Edita reports that back, sides, and feet pain continue. Pt also reports that pain is worse at night. Notes performing HEP every morning.    Objective     Treatment: Edita was instructed in and performed therapeutic exercises to develop strength, endurance, ROM, flexibility, balance, posture and core stabilization for 50 minutes, progressing with reps on this date.   warm-up:  · recumbent bike: no resistance 10'  Supine  · piriformis stretch: 3x30 sec. bilateral  · modified push/pull: 3x5"  · sciatic nerve glide: 3x10  · TA brace: 2x10  · TA brace with hip adduction with ball squeeze: 2x10  · TA brace with alternating march: 2x10  · SLR 1x8  · Knee fall outs 2 x 8    Assessment  Patient tolerated tx progression well during today's visit. Pt required mod verbal cues for proper technique. Pt. reports compliance with HEP. Will continue to progress as casandra.    This is a 52 y.o. female referred to outpatient physical therapy who presents with a medical diagnosis of fibromyalgia and PT diagnosis of weakness and difficulty walking demonstrating joint dysfunction and functional limitation as described above. Level of complexity is moderate difficulty;  based on patient's past medical history including the above co-morbidities and personal factors; functional limitations, and clinical presentation directly impacting his/her plan of care.  Patient will continue to benefit from skilled PT intervention.    Edita is making good progress towards established goals.      Plan  Continue 2x per week.  "

## 2017-12-12 ENCOUNTER — CLINICAL SUPPORT (OUTPATIENT)
Dept: REHABILITATION | Facility: HOSPITAL | Age: 52
End: 2017-12-12
Payer: MEDICAID

## 2017-12-12 PROCEDURE — 97110 THERAPEUTIC EXERCISES: CPT

## 2017-12-12 NOTE — PROGRESS NOTES
"Physical Therapy Progress Note    Evaluation Date: 11/3/17  Visit # authorized: 11/13  Authorization period: 12/31/17  Plan of care Expiration: 1/12/17  Time In: 10  Time Out: 11:30  50 min. 1:1    Subjective  Edita reports she has a lot of pain in her low back and pain to the legs: 9 out of 10. Notes compliance with HEP.    Objective     Treatment: Edita was instructed in and performed therapeutic exercises to develop strength, endurance, ROM, flexibility, balance, posture and core stabilization for 45 minutes  warm-up:  · recumbent bike: no resistance 10'  Supine  · DKTC: 3x20 sec.   · piriformis stretch: 3x30 sec. bilateral  · modified push/pull: 3x5"  · sciatic nerve glide: 3x10  · TA brace with hip adduction with ball squeeze: 3x8  · TA brace with alternating march:3x8  · SLR not performed due to lack core stability  · TA with LTR: 2x8  Sidelying  · s/l clams: 2x8    Manual therapy: Edita  received the following manual therapy techniques x 10 min. to include soft tissue and joint mobilizations were applied to the: bilateral piriformis To include:   Soft tissue mobilization  · MFR bilateral piriformis     Assessment   Patient had fair tolerance to manual therapy and exercise denying increased pain. Pt. appears to have great difficulty with understanding with pain scale as she does not demonstrate grimacing and/or complaint of pain during tx. However, pt. reports very high pain scores. Pt. was reinforced to not force through pain during tx and to communicate reaction to treatment next visit to determine appropriateness of regimen. Pt. has only a few authorized visits and will be given an updated land HEP. Pt. is also advised to either seek a medical fitness referral or seek another gym membership to continue with aquatic regimen in addition to land HEP. Will continue to progress core hip lumbopelvic stabilization as casandra. to ensure replication upon discharge.    This is a 52 y.o. female referred to " outpatient physical therapy who presents with a medical diagnosis of fibromyalgia and PT diagnosis of weakness and difficulty walking demonstrating joint dysfunction and functional limitation as described above. Level of complexity is moderate difficulty;  based on patient's past medical history including the above co-morbidities and personal factors; functional limitations, and clinical presentation directly impacting his/her plan of care.  Patient will continue to benefit from skilled PT intervention.    Edita is making good progress towards established goals.      Plan  Continue 2x per week.

## 2017-12-13 RX ORDER — GABAPENTIN 300 MG/1
300 CAPSULE ORAL 2 TIMES DAILY
Qty: 180 CAPSULE | Refills: 1 | Status: SHIPPED | OUTPATIENT
Start: 2017-12-13 | End: 2018-05-23 | Stop reason: SDUPTHER

## 2017-12-14 ENCOUNTER — CLINICAL SUPPORT (OUTPATIENT)
Dept: REHABILITATION | Facility: HOSPITAL | Age: 52
End: 2017-12-14
Payer: MEDICAID

## 2017-12-14 PROCEDURE — 97110 THERAPEUTIC EXERCISES: CPT

## 2017-12-14 NOTE — PROGRESS NOTES
"Physical Therapy Progress Note    Evaluation Date: 11/3/17  Visit # authorized: 12/13  Authorization period: 12/31/17  Plan of care Expiration: 1/12/17  Time In: 10  Time Out: 11:15  60 min. 1:1    Subjective  Edita reports she denies low back pain today only bilateral ankle pain about an 8 out of 10 today's session.    Objective     Treatment: Edita was instructed in and performed therapeutic exercises to develop strength, endurance, ROM, flexibility, balance, posture and core stabilization for 55 minutes  warm-up:  · recumbent bike: no resistance 10'  Supine  · DKTC: 3x20 sec.   · piriformis stretch: 3x45 sec. bilateral  · modified push/pull: 3x5"  · sciatic nerve glide: 3x10  · TA brace with hip adduction with ball squeeze: 3x8  · TA brace with alternating march:3x8  · SLR not performed due to lack core stability  · TA with LTR: 2x8  Sidelying  · s/l clams: 2x8 bilaterally    Manual therapy: Edita  received the following manual therapy techniques x 10 min. to include soft tissue and joint mobilizations were applied to the: bilateral piriformis To include:   Soft tissue mobilization  · MFR bilateral piriformis NP     Assessment   Patient had a lot more tolerance for exercise denying any pain. Pt. required less verbal cues to initiate and carry-out exercises. Fair progression. Will consider discharge next week due to insurance restriction.    This is a 52 y.o. female referred to outpatient physical therapy who presents with a medical diagnosis of fibromyalgia and PT diagnosis of weakness and difficulty walking demonstrating joint dysfunction and functional limitation as described above. Level of complexity is moderate difficulty;  based on patient's past medical history including the above co-morbidities and personal factors; functional limitations, and clinical presentation directly impacting his/her plan of care.  Patient will continue to benefit from skilled PT intervention.    Edita is making good " progress towards established goals.      Plan  Continue 2x per week.

## 2017-12-18 ENCOUNTER — CLINICAL SUPPORT (OUTPATIENT)
Dept: REHABILITATION | Facility: HOSPITAL | Age: 52
End: 2017-12-18
Payer: MEDICAID

## 2017-12-18 DIAGNOSIS — R26.2 DIFFICULTY WALKING: ICD-10-CM

## 2017-12-18 DIAGNOSIS — R53.1 WEAKNESS: Primary | ICD-10-CM

## 2017-12-18 NOTE — PROGRESS NOTES
"Physical Therapy Progress Note    Evaluation Date: 11/3/17  Visit # authorized: 12/13  Authorization period: 12/31/17  Plan of care Expiration: 1/12/17  Encounter Diagnoses   Name Primary?    Weakness Yes    Difficulty walking      Time In: 10  Time Out: 11:30  40 min. 1:1    Subjective  Edita reports low back pain with minimal leg pain. Pt. reports compliance with HEP several times per day.    Objective     Treatment: Edita was instructed in and performed therapeutic exercises to develop strength, endurance, ROM, flexibility, balance, posture and core stabilization for 55 minutes  warm-up:  · recumbent bike: no resistance 10'  Supine  · DKTC: 3x30 sec.   · piriformis stretch: 3x45 sec. bilateral  · modified push/pull: 3x5"  · sciatic nerve glide: 3x10  · TA brace with hip adduction with ball squeeze: 3x8  · TA brace with alternating march:3x8  · TA with LTR: 2x10  Sidelying  · s/l clams: 2x8 bilaterally (with verbal/tactile cues for proper exercise technique)    Manual therapy: Edita  received the following manual therapy techniques x 10 min. to include soft tissue and joint mobilizations were applied to the: bilateral piriformis To include:   Soft tissue mobilization  · MFR bilateral piriformis NP     Assessment   Patient had a lot more tolerance for exercise denying any pain. Pt. required less verbal cues to initiate and carry-out exercises. Fair progression. Will consider discharge next week due to insurance restriction.    This is a 52 y.o. female referred to outpatient physical therapy who presents with a medical diagnosis of fibromyalgia and PT diagnosis of weakness and difficulty walking demonstrating joint dysfunction and functional limitation as described above. Level of complexity is moderate difficulty;  based on patient's past medical history including the above co-morbidities and personal factors; functional limitations, and clinical presentation directly impacting his/her plan of " care.  Patient will continue to benefit from skilled PT intervention.    Edita is making good progress towards established goals.      Plan  Continue 2x per week.

## 2017-12-20 ENCOUNTER — CLINICAL SUPPORT (OUTPATIENT)
Dept: REHABILITATION | Facility: HOSPITAL | Age: 52
End: 2017-12-20
Payer: MEDICAID

## 2017-12-20 PROCEDURE — 97110 THERAPEUTIC EXERCISES: CPT

## 2017-12-20 NOTE — PROGRESS NOTES
"Physical Therapy Progress Note    Evaluation Date: 11/3/17  Visit # authorized: 13/13  Authorization period: 12/31/17  Plan of care Expiration: 1/12/17  Time In: 10  Time Out: 11:30  45min. 1:1    Subjective  Edita reports feeling real good denying increased pain. Pt. notes compliance with HEP and agrees with plan to continue with her HEP.    Objective     Treatment: Edita was instructed in and performed therapeutic exercises to develop strength, endurance, ROM, flexibility, balance, posture and core stabilization for 55 minutes  UPPER EXTREMITIES: AROM/PROM: (measured in degrees): bold indicates increased  · (R) UE: limited as follows: flexion: 135 deg.; abduction: 160 deg.   · (L) UE: limited as follows: 135 deg.; abduction: 160 deg.        LOWER EXTREMITIES: AROM/PROM: (measured in degrees)  ·  (R) LE: limited as follows:  knee extension: -10 from neutral  ? Hamstrings Length: 65 degrees  ? Ankle Dorsiflexion:   0 degrees  ·  (L) LE: limited as follows: knee extension: -10 from neutral  ? Hamstrings Length: 65 degrees  ? Ankle Dorsiflexion: 0 degrees     Upper Extremity Strength: (graded 1-5 out of 5)     RUE LUE   Shoulder flexion: 4-/5 4-/5   Shoulder Abduction: 4-/5 4-/5   Elbow flexion: 4+/5 4+/5   Elbow extension: 4+/5 4+/5   Wrist flexion: 4/5 4/5   Wrist extension: 4/5 4/5    gross assessment 5/5 5/5      Lower Extremity Strength: (graded 1-5 out of 5)     RLE LLE   Hip flexion:  4+/5 4/5   Hip IR 4/5 4/5   Hip ER 4/5 4/5   Knee extension: 4+/5 4+/5   Great toe extension 3-/5 3-/5   Ankle DF: 4+/5 4+/5   Posterior fibers of Gluteus medius 4-/5 4-/5   PF 4+/5 4+/5   Knee Flexion 4+/5 4+/5   Hip extension 3+/5 3+/5     warm-up:  · recumbent bike: no resistance 10'  Supine  · DKTC: 3x30 sec.   · piriformis stretch: 3x45 sec. bilateral  · modified push/pull: 3x5"  · sciatic nerve glide: 3x10  · TA brace with hip adduction with ball squeeze and bridge: 2x8  · TA brace with alternating march:3x8  · TA " with LTR: 2x10  Sidelying  · s/l clams: 2x8 bilaterally (with verbal/tactile cues for proper exercise technique)  · standing gastroc stretch: 3x30 sec.       Assessment   Patient had improved tolerance to exercise regimen denying increased pain. Pt. had increased UE AROM, overall strength, and function. Pt. is more independent with her exercise regimen and notes compliance. Pt. was given an updated HEP including two new exercises. Pt. noted a decrease in her FOTO survey, which is more than likely related to comprehension of questionnaire. Pt. is discharged at this time to continue on her own due to insurance restrictions.     This is a 52 y.o. female referred to outpatient physical therapy who presents with a medical diagnosis of fibromyalgia and PT diagnosis of weakness and difficulty walking demonstrating joint dysfunction and functional limitation as described above. Level of complexity is moderate difficulty;  based on patient's past medical history including the above co-morbidities and personal factors; functional limitations, and clinical presentation directly impacting his/her plan of care.  Patient will continue to benefit from skilled PT intervention.    Edita is making good progress towards established goals.  Medical necessity is demonstrated by the following IMPAIRMENTS/PROBLEM LIST:              1) Pain limiting function              2) Posture dysfunction              3) Shoulder/Core/Lumbar/LE weakness              4) Decreased thoracic/lumbar joint mobility              5) Decreased Shoulder/Lumbar ROM              6) Decreased soft tissue extensibility/fascia restriction              7) Decreased LE flexibility: bilateral piriformis and hamstrings              8) Lack of HEP              9) LE paresthesia bilateral LEs (more than likely neuropathy)              10) pelvis dysfunction     GOALS:   Short Term Goals:  4 weeks  1. Report decreased lumbar pain </= 5/10 at worst to increase tolerance  for prolonged sitting/standing MET  2. Pt. to demonstrate proper cervical and scapula retraction requiring min. to no verbal cues from PT MET  3. Pt. to be independent with symptom management/pelvis correction MET  4. Pt to tolerate HEP to improve ROM and independence with ADL's MET     Long Term Goals: 8 weeks  1. Report decreased lumbar pain </=  2/10 at worst to increase tolerance for prolonged sitting/standing MET  2. Pt. to demonstrate proper cervical and scapula retraction requiring no verbal cues from PT MET  3. Pt. to demonstrate increased shoulder elevation to 160 deg. to improve ability to reach/lift OH ongoing with HEP  4. Pt. to demonstrate increased MMT for core/lumbar paraspinals to 3/5 to increase endurance with prolonged sitting. MET  5. Pt. to demonstrate increased MMT for bilateral gluteus medius to 4/5 to increase stability during ambulation on uneven surfaces. ongoing with HEP  6. Pt. to demonstrate Increased MMT for bilateral hip flexor to 4/5 to increase tolerance for ADL and work activities. ongoing with HEP  7. Pt to be independent with HEP to improve ROM and independence with ADL's MET  8. Pt. to report a decrease in bilateral LE paresthesia by >/= 50% to improve ability to perform community ambulation MET    Plan  Continue 2x per week.

## 2018-01-16 ENCOUNTER — TELEPHONE (OUTPATIENT)
Dept: GASTROENTEROLOGY | Facility: CLINIC | Age: 53
End: 2018-01-16

## 2018-01-16 NOTE — TELEPHONE ENCOUNTER
----- Message from Kathleen Olivo sent at 1/16/2018  8:49 AM CST -----  Contact: self - [phone]  Brock - needs you to send orders to [facility] so she can have 2 mri's - please call patient at

## 2018-01-16 NOTE — TELEPHONE ENCOUNTER
Orders placed in the mail for patient to bring to [facility] , patient states she didn't have fax number for [facility]

## 2018-01-19 ENCOUNTER — TELEPHONE (OUTPATIENT)
Dept: GASTROENTEROLOGY | Facility: CLINIC | Age: 53
End: 2018-01-19

## 2018-01-19 NOTE — TELEPHONE ENCOUNTER
----- Message from Kathleen Olivo sent at 1/19/2018 10:30 AM CST -----  Contact: self [phone]  Brock - wants orders for mri at [facility] - please call patient at [phone]

## 2018-02-07 ENCOUNTER — TELEPHONE (OUTPATIENT)
Dept: GASTROENTEROLOGY | Facility: CLINIC | Age: 53
End: 2018-02-07

## 2018-02-07 ENCOUNTER — TELEPHONE (OUTPATIENT)
Dept: INTERNAL MEDICINE | Facility: CLINIC | Age: 53
End: 2018-02-07

## 2018-02-07 NOTE — TELEPHONE ENCOUNTER
----- Message from Kathleen Olivo sent at 2/7/2018 10:38 AM CST -----  Contact: self - [phone]  Brock - wants to talk to you re her kidneys - please call patient at  [phone]

## 2018-02-07 NOTE — TELEPHONE ENCOUNTER
----- Message from Tiff Meyer sent at 2/7/2018 10:52 AM CST -----  Contact: Patient [phone]  Requesting a Referral    Requesting to see: Urology    Reason for request: pain in the kidneys    Specific physician requested: No    Please call patient to schedule appt when referral has been placed.    Thank You

## 2018-02-07 NOTE — TELEPHONE ENCOUNTER
Pt states she has been having some pain around kidney area. Been going on for a few days now would like to get a consult in for her to be seen Urology

## 2018-02-07 NOTE — TELEPHONE ENCOUNTER
A spoke with pt , and pt was advised to contact her pcp or rhematology per dr almaguer last note , patient states that she had her mri done at [facility] and waiting for the results , patient states she would contact her pcp or nephrology  for her kidney pain

## 2018-02-08 NOTE — TELEPHONE ENCOUNTER
She needs to be evaluated to help determine the source of the pain; if it is a kidney problem she can be referred to Urology. Please schedule an urgent care visit for flank pain with an available provider.

## 2018-02-10 ENCOUNTER — OFFICE VISIT (OUTPATIENT)
Dept: URGENT CARE | Facility: CLINIC | Age: 53
End: 2018-02-10
Payer: MEDICAID

## 2018-02-10 VITALS
RESPIRATION RATE: 18 BRPM | HEIGHT: 66 IN | WEIGHT: 212 LBS | SYSTOLIC BLOOD PRESSURE: 142 MMHG | TEMPERATURE: 99 F | OXYGEN SATURATION: 98 % | HEART RATE: 91 BPM | DIASTOLIC BLOOD PRESSURE: 84 MMHG | BODY MASS INDEX: 34.07 KG/M2

## 2018-02-10 DIAGNOSIS — R35.0 FREQUENCY OF URINATION: ICD-10-CM

## 2018-02-10 DIAGNOSIS — M54.41 CHRONIC BILATERAL LOW BACK PAIN WITH BILATERAL SCIATICA: Primary | ICD-10-CM

## 2018-02-10 DIAGNOSIS — M26.622 ARTHRALGIA OF LEFT TEMPOROMANDIBULAR JOINT: ICD-10-CM

## 2018-02-10 DIAGNOSIS — G89.29 CHRONIC BILATERAL LOW BACK PAIN WITH BILATERAL SCIATICA: Primary | ICD-10-CM

## 2018-02-10 DIAGNOSIS — M54.42 CHRONIC BILATERAL LOW BACK PAIN WITH BILATERAL SCIATICA: Primary | ICD-10-CM

## 2018-02-10 DIAGNOSIS — Z86.73 HISTORY OF STROKE: ICD-10-CM

## 2018-02-10 LAB
BILIRUB UR QL STRIP: NEGATIVE
GLUCOSE UR QL STRIP: NEGATIVE
KETONES UR QL STRIP: NEGATIVE
LEUKOCYTE ESTERASE UR QL STRIP: NEGATIVE
PH, POC UA: 5.5
POC BLOOD, URINE: NEGATIVE
POC NITRATES, URINE: NEGATIVE
PROT UR QL STRIP: NEGATIVE
SP GR UR STRIP: 1.02 (ref 1–1.03)
UROBILINOGEN UR STRIP-ACNC: POSITIVE (ref 0.1–1.1)

## 2018-02-10 PROCEDURE — 81003 URINALYSIS AUTO W/O SCOPE: CPT | Mod: QW,S$GLB,, | Performed by: NURSE PRACTITIONER

## 2018-02-10 PROCEDURE — 99214 OFFICE O/P EST MOD 30 MIN: CPT | Mod: 25,S$GLB,, | Performed by: NURSE PRACTITIONER

## 2018-02-10 PROCEDURE — 3008F BODY MASS INDEX DOCD: CPT | Mod: S$GLB,,, | Performed by: NURSE PRACTITIONER

## 2018-02-10 NOTE — PATIENT INSTRUCTIONS
You may take Tylenol Extra Strength 1000 mg (2 tablets) every 8 hours, with a maximum daily dose of 3,000 mg for your chronic pain.    Please follow up with your Primary Care Provider for this chronic issue.      Back Pain (Acute or Chronic)    Back pain is one of the most common problems. The good news is that most people feel better in 1 to 2 weeks, and most of the rest in 1 to 2 months. Most people can remain active.  People experience and describe pain differently; not everyone is the same.  · The pain can be sharp, stabbing, shooting, aching, cramping or burning.  · Movement, standing, bending, lifting, sitting, or walking may worsen pain.  · It can be localized to one spot or area, or it can be more generalized.  · It can spread or radiate upwards, to the front, or go down your arms or legs (sciatica).  · It can cause muscle spasm.  Most of the time, mechanical problems with the muscles or spine cause the pain. Mechanical problems are usually caused by an injury to the muscles or ligaments. While illness can cause back pain, it is usually not caused by a serious illness. Mechanical problems include:   · Physical activity such as sports, exercise, work, or normal activity  · Overexertion, lifting, pushing, pulling incorrectly or too aggressively  · Sudden twisting, bending, or stretching from an accident, or accidental movement  · Poor posture  · Stretching or moving wrong, without noticing pain at the time  · Poor coordination, lack of regular exercise (check with your doctor about this)  · Spinal disc disease or arthritis  · Stress  Pain can also be related to pregnancy, or illness like appendicitis, bladder or kidney infections, pelvic infections, and many other things.  Acute back pain usually gets better in 1 to 2 weeks. Back pain related to disk disease, arthritis in the spinal joints or spinal stenosis (narrowing of the spinal canal) can become chronic and last for months or years.  Unless you had a  physical injury (for example, a car accident or fall) X-rays are usually not needed for the initial evaluation of back pain. If pain continues and does not respond to medical treatment, X-rays and other tests may be needed.  Home care  Try these home care recommendations:  · When in bed, try to find a position of comfort. A firm mattress is best. Try lying flat on your back with pillows under your knees. You can also try lying on your side with your knees bent up towards your chest and a pillow between your knees.  · At first, do not try to stretch out the sore spots. If there is a strain, it is not like the good soreness you get after exercising without an injury. In this case, stretching may make it worse.  · Avoid prolong sitting, long car rides, or travel. This puts more stress on the lower back than standing or walking.  · During the first 24 to 72 hours after an acute injury or flare up of chronic back pain, apply an ice pack to the painful area for 20 minutes and then remove it for 20 minutes. Do this over a period of 60 to 90 minutes or several times a day. This will reduce swelling and pain. Wrap the ice pack in a thin towel or plastic to protect your skin.  · You can start with ice, then switch to heat. Heat (hot shower, hot bath, or heating pad) reduces pain and works well for muscle spasms. Heat can be applied to the painful area for 20 minutes then remove it for 20 minutes. Do this over a period of 60 to 90 minutes or several times a day. Do not sleep on a heating pad. It can lead to skin burns or tissue damage.  · You can alternate ice and heat therapy. Talk with your doctor about the best treatment for your back pain.  · Therapeutic massage can help relax the back muscles without stretching them.  · Be aware of safe lifting methods and do not lift anything without stretching first.  Medicines  Talk to your doctor before using medicine, especially if you have other medical problems or are taking other  medicines.  · You may use over-the-counter medicine as directed on the bottle to control pain, unless another pain medicine was prescribed. If you have chronic conditions like diabetes, liver or kidney disease, stomach ulcers, or gastrointestinal bleeding, or are taking blood thinners, talk to your doctor before taking any medicine.  · Be careful if you are given a prescription medicines, narcotics, or medicine for muscle spasms. They can cause drowsiness, affect your coordination, reflexes, and judgement. Do not drive or operate heavy machinery.  Follow-up care  Follow up with your healthcare provider, or as advised.   A radiologist will review any X-rays that were taken. Your provide will notify you of any new findings that may affect your care.  Call 911  Call emergency services if any of the following occur:  · Trouble breathing  · Confusion  · Very drowsy or trouble awakening  · Fainting or loss of consciousness  · Rapid or very slow heart rate  · Loss of bowel or bladder control  When to seek medical advice  Call your healthcare provider right away if any of these occur:   · Pain becomes worse or spreads to your legs  · Weakness or numbness in one or both legs  · Numbness in the groin or genital area  Date Last Reviewed: 7/1/2016 © 2000-2017 [employer]. [address] [place] [address], [place] [address]. All rights reserved. This information is not intended as a substitute for professional medical care. Always follow your healthcare professional's instructions.        Self-Care for Temporomandibular Disorders (TMD)  You have temporomandibular disorder (TMD). This term describes a group of problems related to the temporomandibular joint (TMJ) and nearby muscles. The TMJ is located where the upper and lower jaws meet. Treatment will get your jaw back to normal function. But your care doesnt end there. Once youve had TMD, its important to avoid reinjury. Get in the habit of doing self-checks. This can  make you aware of any symptoms that begin to return, so you can take action right away.    Doing self-checks  Make it a habit to assess your body a few times each day. Try writing yourself a reminder. Or set an alarm on your watch or computer. When doing a self-check, ask yourself:  · Do I feel stressed?  · Are my muscles tense?  · Am I grinding or clenching my teeth?  · Is my posture healthy for my body?  · Is there anything I can do to make myself more comfortable?  If you answer yes to any of the questions above, you need to take action. Adjusting your posture or taking a short break can help prevent or relieve TMD symptoms.  Listening to your body  Many people get used to ignoring pain. But pain is a signal that your body needs care. To maintain your TMJ health:  · Avoid hard or chewy foods. Even if you feel fine, eating such foods can trigger symptoms again.  · Be aware of your body. Dont ignore TMD symptoms. The nagging pain in your neck or jaw may be a sign that you need care.  · Be sure to keep follow-up appointments with your health care team.  Managing stress  Stress is a key factor in TMD. Stress can cause you to clench your muscles or grind your teeth. It can also affect your sleep, reducing your bodys ability to heal. Here are a few tips to manage stress:  · Learn ways to relax. Try listening to music or gently stretching. Take a few slow deep breaths. Or, close your eyes and imagine a place or object that is calming.  · Get plenty of rest and sleep.  · Set goals you know you can attain.  · Make time for people and things you enjoy.  · Ask for help if you need it. Friends and family can run errands and cook meals for you.   Staying active  Activity helps the body in many ways. You stay looser and more relaxed. It also helps keep muscles and tissues conditioned. That way you can heal faster and make reinjury less likely. Here are some tips to get you started:  · Talk to your health care provider  before starting an exercise program.  · Always warm up and stretch before each activity. This helps prevent injury.  · Try walking or swimming. These activities are easy on your joints. They also benefit your heart and lungs.  · Try yoga or bairon chi. These are relaxing activities known for reducing stress.  Date Last Reviewed: 7/13/2015  © 4837-0816 [employer]. [address] [place] [address], [place] [address]. All rights reserved. This information is not intended as a substitute for professional medical care. Always follow your healthcare professional's instructions.

## 2018-02-10 NOTE — PROGRESS NOTES
"Subjective:       Patient ID: Edita Riley is a 53 y.o. female.    Vitals:  height is 5' 6" (1.676 m) and weight is 96.2 kg (212 lb). Her tympanic temperature is 98.5 °F (36.9 °C). Her blood pressure is 142/84 (abnormal) and her pulse is 91. Her respiration is 18 and oxygen saturation is 98%.     Chief Complaint: Back Pain and Otalgia    Chronic low back pain exacerbation without trauma or injury and left ear otalgia.  Takes gabapentin daily, but no other medication used for this pain. Has had this current exacerbation for 5 weeks and only contacted her PCP 2-3 days ago who suggested Urgent Care for this chronic issue.    Endorses frequency of urination and ran UA for possible UTI which was negative.    Pertinent Hx of stroke with left side hemiplegia.        Back Pain   This is a new problem. The current episode started 1 to 4 weeks ago. The problem occurs constantly. The problem has been gradually worsening since onset. The pain radiates to the left thigh, right thigh, right knee, left knee, left foot and right foot. The pain is at a severity of 10/10. The pain is severe. The symptoms are aggravated by twisting, urinating, lying down, position, standing and sitting. Stiffness is present all day. Associated symptoms include headaches. Pertinent negatives include no abdominal pain, dysuria or fever.   Otalgia    There is pain in the left ear. This is a new problem. The current episode started 1 to 4 weeks ago. The problem occurs constantly. The problem has been gradually worsening. There has been no fever. The pain is at a severity of 9/10. The pain is severe. Associated symptoms include headaches. Pertinent negatives include no abdominal pain or vomiting.     Review of Systems   Constitution: Negative for chills and fever.   HENT: Positive for ear pain.    Skin: Negative for itching.   Musculoskeletal: Positive for back pain.   Gastrointestinal: Negative for abdominal pain, nausea and vomiting. "   Genitourinary: Positive for frequency. Negative for dysuria, genital sores, hematuria, missed menses, non-menstrual bleeding and urgency.   Neurological: Positive for headaches.       Objective:      Physical Exam   Constitutional: She is oriented to person, place, and time. Vital signs are normal. She appears well-developed and well-nourished. She is active and cooperative. No distress.   HENT:   Head: Normocephalic and atraumatic.   Right Ear: Hearing, tympanic membrane, external ear and ear canal normal.   Left Ear: Hearing, tympanic membrane, external ear and ear canal normal.   Nose: Nose normal.   Mouth/Throat: Oropharynx is clear and moist and mucous membranes are normal.   Tenderness and crepitus over TMJ (left)   Eyes: Conjunctivae and lids are normal.   Neck: Trachea normal, normal range of motion, full passive range of motion without pain and phonation normal. Neck supple.   Cardiovascular: Normal rate, regular rhythm, normal heart sounds, intact distal pulses and normal pulses.    Pulmonary/Chest: Effort normal and breath sounds normal.   Abdominal: Soft. Normal appearance and bowel sounds are normal. She exhibits no abdominal bruit, no pulsatile midline mass and no mass.   Musculoskeletal: She exhibits no edema or deformity.        Lumbar back: She exhibits decreased range of motion. She exhibits no tenderness, no swelling and no deformity.   No TTP   Neurological: She is alert and oriented to person, place, and time. She has normal strength and normal reflexes. No sensory deficit.   Skin: Skin is warm, dry and intact. She is not diaphoretic.   Psychiatric: She has a normal mood and affect. Her speech is normal and behavior is normal. Judgment and thought content normal. Cognition and memory are normal.   Nursing note and vitals reviewed.      POCT Urinalysis, Dipstick, Automated, W/O Scope   Order: [id number]   Status:  Final result   Visible to patient:  No (Not Released)   Next appt:  None   Dx:   Frequency of urination    Ref Range & Units 10:12   POC Blood, Urine Negative Negative    POC Bilirubin, Urine Negative Negative    POC Urobilinogen, Urine 0.1 - 1.1 Positive    Comments: 1.0 mg/dL   POC Ketones, Urine Negative Negative    POC Protein, Urine Negative Negative    POC Nitrates, Urine Negative Negative    POC Glucose, Urine Negative Negative    pH, UA  5.5    POC Specific Gravity, Urine 1.003 - 1.029 1.025    POC Leukocytes, Urine Negative Negative    Resulting Agency  [facility]             Assessment:       1. Chronic bilateral low back pain with bilateral sciatica    2. Arthralgia of left temporomandibular joint    3. Frequency of urination    4. History of stroke        Plan:         Chronic bilateral low back pain with bilateral sciatica    Arthralgia of left temporomandibular joint    Frequency of urination  -     POCT Urinalysis, Dipstick, Automated, W/O Scope    History of stroke      Patient Instructions     You may take Tylenol Extra Strength 1000 mg (2 tablets) every 8 hours, with a maximum daily dose of 3,000 mg for your chronic pain.    Please follow up with your Primary Care Provider for this chronic issue.      Back Pain (Acute or Chronic)    Back pain is one of the most common problems. The good news is that most people feel better in 1 to 2 weeks, and most of the rest in 1 to 2 months. Most people can remain active.  People experience and describe pain differently; not everyone is the same.  · The pain can be sharp, stabbing, shooting, aching, cramping or burning.  · Movement, standing, bending, lifting, sitting, or walking may worsen pain.  · It can be localized to one spot or area, or it can be more generalized.  · It can spread or radiate upwards, to the front, or go down your arms or legs (sciatica).  · It can cause muscle spasm.  Most of the time, mechanical problems with the muscles or spine cause the pain. Mechanical problems are usually caused by an injury to the muscles or  ligaments. While illness can cause back pain, it is usually not caused by a serious illness. Mechanical problems include:   · Physical activity such as sports, exercise, work, or normal activity  · Overexertion, lifting, pushing, pulling incorrectly or too aggressively  · Sudden twisting, bending, or stretching from an accident, or accidental movement  · Poor posture  · Stretching or moving wrong, without noticing pain at the time  · Poor coordination, lack of regular exercise (check with your doctor about this)  · Spinal disc disease or arthritis  · Stress  Pain can also be related to pregnancy, or illness like appendicitis, bladder or kidney infections, pelvic infections, and many other things.  Acute back pain usually gets better in 1 to 2 weeks. Back pain related to disk disease, arthritis in the spinal joints or spinal stenosis (narrowing of the spinal canal) can become chronic and last for months or years.  Unless you had a physical injury (for example, a car accident or fall) X-rays are usually not needed for the initial evaluation of back pain. If pain continues and does not respond to medical treatment, X-rays and other tests may be needed.  Home care  Try these home care recommendations:  · When in bed, try to find a position of comfort. A firm mattress is best. Try lying flat on your back with pillows under your knees. You can also try lying on your side with your knees bent up towards your chest and a pillow between your knees.  · At first, do not try to stretch out the sore spots. If there is a strain, it is not like the good soreness you get after exercising without an injury. In this case, stretching may make it worse.  · Avoid prolong sitting, long car rides, or travel. This puts more stress on the lower back than standing or walking.  · During the first 24 to 72 hours after an acute injury or flare up of chronic back pain, apply an ice pack to the painful area for 20 minutes and then remove it for  20 minutes. Do this over a period of 60 to 90 minutes or several times a day. This will reduce swelling and pain. Wrap the ice pack in a thin towel or plastic to protect your skin.  · You can start with ice, then switch to heat. Heat (hot shower, hot bath, or heating pad) reduces pain and works well for muscle spasms. Heat can be applied to the painful area for 20 minutes then remove it for 20 minutes. Do this over a period of 60 to 90 minutes or several times a day. Do not sleep on a heating pad. It can lead to skin burns or tissue damage.  · You can alternate ice and heat therapy. Talk with your doctor about the best treatment for your back pain.  · Therapeutic massage can help relax the back muscles without stretching them.  · Be aware of safe lifting methods and do not lift anything without stretching first.  Medicines  Talk to your doctor before using medicine, especially if you have other medical problems or are taking other medicines.  · You may use over-the-counter medicine as directed on the bottle to control pain, unless another pain medicine was prescribed. If you have chronic conditions like diabetes, liver or kidney disease, stomach ulcers, or gastrointestinal bleeding, or are taking blood thinners, talk to your doctor before taking any medicine.  · Be careful if you are given a prescription medicines, narcotics, or medicine for muscle spasms. They can cause drowsiness, affect your coordination, reflexes, and judgement. Do not drive or operate heavy machinery.  Follow-up care  Follow up with your healthcare provider, or as advised.   A radiologist will review any X-rays that were taken. Your provide will notify you of any new findings that may affect your care.  Call 911  Call emergency services if any of the following occur:  · Trouble breathing  · Confusion  · Very drowsy or trouble awakening  · Fainting or loss of consciousness  · Rapid or very slow heart rate  · Loss of bowel or bladder  control  When to seek medical advice  Call your healthcare provider right away if any of these occur:   · Pain becomes worse or spreads to your legs  · Weakness or numbness in one or both legs  · Numbness in the groin or genital area  Date Last Reviewed: 7/1/2016 © 2000-2017 [employer]. [address] [place] [id number]. All rights reserved. This information is not intended as a substitute for professional medical care. Always follow your healthcare professional's instructions.        Self-Care for Temporomandibular Disorders (TMD)  You have temporomandibular disorder (TMD). This term describes a group of problems related to the temporomandibular joint (TMJ) and nearby muscles. The TMJ is located where the upper and lower jaws meet. Treatment will get your jaw back to normal function. But your care doesnt end there. Once youve had TMD, its important to avoid reinjury. Get in the habit of doing self-checks. This can make you aware of any symptoms that begin to return, so you can take action right away.    Doing self-checks  Make it a habit to assess your body a few times each day. Try writing yourself a reminder. Or set an alarm on your watch or computer. When doing a self-check, ask yourself:  · Do I feel stressed?  · Are my muscles tense?  · Am I grinding or clenching my teeth?  · Is my posture healthy for my body?  · Is there anything I can do to make myself more comfortable?  If you answer yes to any of the questions above, you need to take action. Adjusting your posture or taking a short break can help prevent or relieve TMD symptoms.  Listening to your body  Many people get used to ignoring pain. But pain is a signal that your body needs care. To maintain your TMJ health:  · Avoid hard or chewy foods. Even if you feel fine, eating such foods can trigger symptoms again.  · Be aware of your body. Dont ignore TMD symptoms. The nagging pain in your neck or jaw may be a sign that you  need care.  · Be sure to keep follow-up appointments with your health care team.  Managing stress  Stress is a key factor in TMD. Stress can cause you to clench your muscles or grind your teeth. It can also affect your sleep, reducing your bodys ability to heal. Here are a few tips to manage stress:  · Learn ways to relax. Try listening to music or gently stretching. Take a few slow deep breaths. Or, close your eyes and imagine a place or object that is calming.  · Get plenty of rest and sleep.  · Set goals you know you can attain.  · Make time for people and things you enjoy.  · Ask for help if you need it. Friends and family can run errands and cook meals for you.   Staying active  Activity helps the body in many ways. You stay looser and more relaxed. It also helps keep muscles and tissues conditioned. That way you can heal faster and make reinjury less likely. Here are some tips to get you started:  · Talk to your health care provider before starting an exercise program.  · Always warm up and stretch before each activity. This helps prevent injury.  · Try walking or swimming. These activities are easy on your joints. They also benefit your heart and lungs.  · Try yoga or bairon chi. These are relaxing activities known for reducing stress.  Date Last Reviewed: 7/13/2015  © 7032-5054 [employer]. [address] [place] [address], [place] [address]. All rights reserved. This information is not intended as a substitute for professional medical care. Always follow your healthcare professional's instructions.

## 2018-03-11 ENCOUNTER — HOSPITAL ENCOUNTER (EMERGENCY)
Facility: HOSPITAL | Age: 53
Discharge: HOME | End: 2018-03-11
Attending: EMERGENCY MEDICINE
Payer: MEDICAID

## 2018-03-11 VITALS
HEART RATE: 83 BPM | DIASTOLIC BLOOD PRESSURE: 72 MMHG | OXYGEN SATURATION: 96 % | TEMPERATURE: 98 F | SYSTOLIC BLOOD PRESSURE: 120 MMHG | RESPIRATION RATE: 18 BRPM | HEIGHT: 67 IN

## 2018-03-11 DIAGNOSIS — M54.50 LUMBAGO: ICD-10-CM

## 2018-03-11 DIAGNOSIS — M54.9 BACK PAIN, UNSPECIFIED BACK LOCATION, UNSPECIFIED BACK PAIN LATERALITY, UNSPECIFIED CHRONICITY: Primary | ICD-10-CM

## 2018-03-11 LAB
BACTERIA #/AREA URNS AUTO: NORMAL /HPF
BILIRUB UR QL STRIP: NEGATIVE
BUN SERPL-MCNC: 15 MG/DL (ref 6–30)
CHLORIDE SERPL-SCNC: 102 MMOL/L (ref 95–110)
CLARITY UR REFRACT.AUTO: ABNORMAL
COLOR UR AUTO: YELLOW
CREAT SERPL-MCNC: 0.9 MG/DL (ref 0.5–1.4)
GLUCOSE SERPL-MCNC: 104 MG/DL (ref 70–110)
GLUCOSE UR QL STRIP: NEGATIVE
HCT VFR BLD CALC: 43 %PCV (ref 36–54)
HGB UR QL STRIP: NEGATIVE
KETONES UR QL STRIP: NEGATIVE
LEUKOCYTE ESTERASE UR QL STRIP: NEGATIVE
MICROSCOPIC COMMENT: NORMAL
NITRITE UR QL STRIP: NEGATIVE
PH UR STRIP: 5 [PH] (ref 5–8)
POC IONIZED CALCIUM: 1.03 MMOL/L (ref 1.06–1.42)
POC TCO2 (MEASURED): 28 MMOL/L (ref 23–29)
POTASSIUM BLD-SCNC: 4.2 MMOL/L (ref 3.5–5.1)
PROT UR QL STRIP: NEGATIVE
RBC #/AREA URNS AUTO: 1 /HPF (ref 0–4)
SAMPLE: ABNORMAL
SODIUM BLD-SCNC: 137 MMOL/L (ref 136–145)
SP GR UR STRIP: 1.02 (ref 1–1.03)
URN SPEC COLLECT METH UR: ABNORMAL
UROBILINOGEN UR STRIP-ACNC: 2 EU/DL
WBC #/AREA URNS AUTO: 1 /HPF (ref 0–5)

## 2018-03-11 PROCEDURE — 81001 URINALYSIS AUTO W/SCOPE: CPT

## 2018-03-11 PROCEDURE — 99283 EMERGENCY DEPT VISIT LOW MDM: CPT

## 2018-03-11 PROCEDURE — 25000003 PHARM REV CODE 250: Performed by: EMERGENCY MEDICINE

## 2018-03-11 PROCEDURE — 99282 EMERGENCY DEPT VISIT SF MDM: CPT | Mod: ,,, | Performed by: EMERGENCY MEDICINE

## 2018-03-11 RX ORDER — ACETAMINOPHEN 500 MG
1000 TABLET ORAL
Status: COMPLETED | OUTPATIENT
Start: 2018-03-11 | End: 2018-03-11

## 2018-03-11 RX ORDER — TRAMADOL HYDROCHLORIDE 50 MG/1
50 TABLET ORAL EVERY 6 HOURS PRN
COMMUNITY
End: 2020-03-12

## 2018-03-11 RX ADMIN — ACETAMINOPHEN 1000 MG: 500 TABLET ORAL at 12:03

## 2018-03-11 NOTE — ED TRIAGE NOTES
"Patient states mid lower back pain for a "long time" worse x "many years" On Flexeril and Tramadol for  Fibromyalgia.   "

## 2018-03-11 NOTE — ED PROVIDER NOTES
"Encounter Date: 3/11/2018       History     Chief Complaint   Patient presents with    Back Pain     back has been hurting for months. pain getting worse and she "can barely walk". reports hx of kidney problems     Pt here with multiple complaints.  Reports back pain, lumbar region, x months, constant, no trauma, previous recent evaluations for this complaint by providers in [facility].  Pt reports numbness/weakness over her entire body but not localized to LE.  Also reports "dark urine" x months which she says is 2/2 her kidneys failing (though on review of EPIC, creatinine 5 months ago normal).  Also c/o dark stools x months, no BRBPR.  Does take MOM.        The history is provided by the patient.   Back Pain    Associated symptoms include numbness. Pertinent negatives include no chest pain, no fever, no headaches, no abdominal pain and no dysuria.     Review of patient's allergies indicates:   Allergen Reactions    Bee sting [allergen ext-venom-honey bee]      Rash      Grass pollen-bermuda, standard      rash     Past Medical History:   Diagnosis Date    Cervical cancer 2014    Chronic back pain     Fibromyalgia     GERD (gastroesophageal reflux disease)     Hiatal hernia 2014    Hypertension     Lactose intolerance     Osteoarthritis of back     Stroke 1972     Past Surgical History:   Procedure Laterality Date    BILATERAL OOPHORECTOMY  2015    CHOLECYSTECTOMY  11/09/2016    Done at [facility], path showed chronic cholecystitis and gallstones    COLONOSCOPY  2014    [facility]    ESOPHAGOGASTRODUODENOSCOPY  2014    HYSTERECTOMY  2015    OOPHORECTOMY       Family History   Problem Relation Age of Onset    Heart disease Sister     Heart disease Maternal Grandmother     Colon cancer Father     Esophageal cancer Father     Cancer Father      Lung    Cancer Mother      Cervical    Rectal cancer Neg Hx     Stomach cancer Neg Hx     Crohn's disease " Neg Hx     Ulcerative colitis Neg Hx      Social History   Substance Use Topics    Smoking status: Never Smoker    Smokeless tobacco: Never Used    Alcohol use No     Review of Systems   Constitutional: Negative for chills and fever.   Respiratory: Negative for shortness of breath.    Cardiovascular: Negative for chest pain.   Gastrointestinal: Negative for abdominal pain.   Genitourinary: Negative for difficulty urinating and dysuria.   Musculoskeletal: Positive for back pain. Negative for gait problem and neck pain.   Neurological: Positive for numbness. Negative for headaches.       Physical Exam     Initial Vitals [03/11/18 1154]   BP Pulse Resp Temp SpO2   (!) 182/76 93 18 97.9 °F (36.6 °C) 98 %      MAP       111.33         Physical Exam    Nursing note and vitals reviewed.  Constitutional: She appears well-developed and well-nourished. She is not diaphoretic. No distress.   HENT:   Head: Normocephalic and atraumatic.   Mouth/Throat: Oropharynx is clear and moist.   Eyes: Conjunctivae are normal.   Neck: Neck supple.   Cardiovascular: Normal rate.   Pulmonary/Chest: No respiratory distress.   Abdominal: Soft. She exhibits no distension. There is no tenderness.   Genitourinary: Rectal exam shows guaiac negative stool. Guaiac negative stool.   Musculoskeletal: She exhibits no edema.   Neurological: She is alert and oriented to person, place, and time. She has normal strength. No sensory deficit.   Skin: Skin is warm and dry. No pallor.   Psychiatric: She has a normal mood and affect.         ED Course   Procedures  Labs Reviewed   URINALYSIS, REFLEX TO URINE CULTURE - Abnormal; Notable for the following:        Result Value    Appearance, UA Cloudy (*)     All other components within normal limits    Narrative:     Preferred Collection Type->Urine, Clean Catch   ISTAT PROCEDURE - Abnormal; Notable for the following:     POC Ionized Calcium 1.03 (*)     All other components within normal limits   URINALYSIS  "MICROSCOPIC    Narrative:     Preferred Collection Type->Urine, Clean Catch             Medical Decision Making:   Initial Assessment:   54 yo f, h/o fibromyalgia, chronic back pain, stroke, here with multiple complaints, all ongoing x months without acute change.  Back pain, no red flags (LE strength 5/5, able to ambulate in the room), black stools but light stools on rectal exam, guaiac negative and "dark urine" in setting of normal recent kidney function.  Exam benign except for elevated BP and Pt has asymptomatic HTN.  Per most recent [facility] clinical policy, there is no indication for emergent work-up or treatment in the ED.  Pt to f/u in outpatient setting with PMD for further management of HTN.    ED Management:  Will get UA and ISTAT  If normal, safe for d/c home              Attending Attestation:             Attending ED Notes:   1:32 PM   ISTAT normal, UA normal  Will d/c home             Clinical Impression:   The encounter diagnosis was Back pain, unspecified back location, unspecified back pain laterality, unspecified chronicity.       I, Dr. Gissel Bryson, personally performed the services described in this documentation. All medical record entries made by the scribe were at my direction and in my presence.  I have reviewed the chart and agree that the record reflects my personal performance and is accurate and complete. Gissel Bryson MD.  4:44 PM 03/12/2018                        Gissel Bryson MD  03/12/18 [id number]    "

## 2018-03-11 NOTE — ED NOTES
"Patient identifiers verified and correct for Ms Riley  C/C: Low back pain, chronic  APPEARANCE: awake and alert in NAD.  SKIN: warm, dry and intact. No breakdown or bruising.  MUSCULOSKELETAL: Patient moving all extremities spontaneously, no obvious swelling or deformities noted. Ambulates independently.  RESPIRATORY: Denies shortness of breath.Respirations unlabored.   CARDIAC: Denies CP, 2+ distal pulses; no peripheral edema  ABDOMEN: S/ND/NT, Denies nausea  : voids spontaneously, denies difficulty, states urine dark colored.   Neurologic: AAO x 4; follows commands equal strength in all extremities; denies numbness/tingling. Denies dizziness Denies weakness, states "kidneys are bad" STates mid lower back pain that radiates down both legs, denies numbness  "

## 2018-03-12 ENCOUNTER — TELEPHONE (OUTPATIENT)
Dept: GASTROENTEROLOGY | Facility: CLINIC | Age: 53
End: 2018-03-12

## 2018-03-12 ENCOUNTER — PES CALL (OUTPATIENT)
Dept: ADMINISTRATIVE | Facility: CLINIC | Age: 53
End: 2018-03-12

## 2018-03-12 NOTE — TELEPHONE ENCOUNTER
----- Message from Donna Dorado sent at 3/12/2018  3:14 PM CDT -----  Contact: Pt  Pt is returning call and can be reached at [phone].    Thank you

## 2018-03-12 NOTE — TELEPHONE ENCOUNTER
----- Message from Kathleen Olivo sent at 3/12/2018  2:44 PM CDT -----  Contact: self - [phone]  Brock - is calling to schedule a cscope - please call patient at [phone]

## 2018-03-12 NOTE — TELEPHONE ENCOUNTER
Ma returned pt call no answer message left on pt vm , per pathology reports pt is not due for procedure until 2021 last procedure was in 2016 and needs repeat in 5 years

## 2018-03-22 DIAGNOSIS — R23.2 HOT FLASHES: ICD-10-CM

## 2018-03-22 RX ORDER — ESTRADIOL 1 MG/1
TABLET ORAL
Qty: 30 TABLET | Refills: 11 | OUTPATIENT
Start: 2018-03-22

## 2018-04-10 ENCOUNTER — OFFICE VISIT (OUTPATIENT)
Dept: INTERNAL MEDICINE | Facility: CLINIC | Age: 53
End: 2018-04-10
Payer: MEDICAID

## 2018-04-10 ENCOUNTER — TELEPHONE (OUTPATIENT)
Dept: INTERNAL MEDICINE | Facility: CLINIC | Age: 53
End: 2018-04-10

## 2018-04-10 ENCOUNTER — LAB VISIT (OUTPATIENT)
Dept: LAB | Facility: HOSPITAL | Age: 53
End: 2018-04-10
Attending: INTERNAL MEDICINE
Payer: MEDICAID

## 2018-04-10 ENCOUNTER — TELEPHONE (OUTPATIENT)
Dept: UROGYNECOLOGY | Facility: CLINIC | Age: 53
End: 2018-04-10

## 2018-04-10 VITALS
SYSTOLIC BLOOD PRESSURE: 122 MMHG | BODY MASS INDEX: 36.16 KG/M2 | DIASTOLIC BLOOD PRESSURE: 82 MMHG | WEIGHT: 230.38 LBS | HEIGHT: 67 IN | HEART RATE: 96 BPM

## 2018-04-10 DIAGNOSIS — E55.9 VITAMIN D DEFICIENCY: ICD-10-CM

## 2018-04-10 DIAGNOSIS — G89.29 CHRONIC MIDLINE THORACIC BACK PAIN: ICD-10-CM

## 2018-04-10 DIAGNOSIS — K92.1 MELENA: ICD-10-CM

## 2018-04-10 DIAGNOSIS — R10.9 CHRONIC ABDOMINAL PAIN: Primary | ICD-10-CM

## 2018-04-10 DIAGNOSIS — G89.29 CHRONIC MIDLINE LOW BACK PAIN WITHOUT SCIATICA: ICD-10-CM

## 2018-04-10 DIAGNOSIS — H92.02 LEFT EAR PAIN: ICD-10-CM

## 2018-04-10 DIAGNOSIS — G89.29 CHRONIC ABDOMINAL PAIN: Primary | ICD-10-CM

## 2018-04-10 DIAGNOSIS — R79.89 ELEVATED SERUM CREATININE: ICD-10-CM

## 2018-04-10 DIAGNOSIS — M54.50 CHRONIC MIDLINE LOW BACK PAIN WITHOUT SCIATICA: ICD-10-CM

## 2018-04-10 DIAGNOSIS — G47.20 SLEEP STAGE DYSFUNCTION: ICD-10-CM

## 2018-04-10 DIAGNOSIS — M54.6 CHRONIC MIDLINE THORACIC BACK PAIN: ICD-10-CM

## 2018-04-10 DIAGNOSIS — R30.0 DYSURIA: ICD-10-CM

## 2018-04-10 LAB
25(OH)D3+25(OH)D2 SERPL-MCNC: 13 NG/ML
ANION GAP SERPL CALC-SCNC: 8 MMOL/L
BACTERIA #/AREA URNS AUTO: NORMAL /HPF
BILIRUB UR QL STRIP: NEGATIVE
BUN SERPL-MCNC: 11 MG/DL
CALCIUM SERPL-MCNC: 9 MG/DL
CHLORIDE SERPL-SCNC: 104 MMOL/L
CLARITY UR REFRACT.AUTO: CLEAR
CO2 SERPL-SCNC: 28 MMOL/L
COLOR UR AUTO: YELLOW
CREAT SERPL-MCNC: 0.9 MG/DL
ERYTHROCYTE [DISTWIDTH] IN BLOOD BY AUTOMATED COUNT: 14 %
EST. GFR  (AFRICAN AMERICAN): >60 ML/MIN/1.73 M^2
EST. GFR  (NON AFRICAN AMERICAN): >60 ML/MIN/1.73 M^2
GLUCOSE SERPL-MCNC: 102 MG/DL
GLUCOSE UR QL STRIP: NEGATIVE
HCT VFR BLD AUTO: 39.5 %
HGB BLD-MCNC: 12.6 G/DL
HGB UR QL STRIP: NEGATIVE
KETONES UR QL STRIP: NEGATIVE
LEUKOCYTE ESTERASE UR QL STRIP: NEGATIVE
MCH RBC QN AUTO: 28.3 PG
MCHC RBC AUTO-ENTMCNC: 31.9 G/DL
MCV RBC AUTO: 89 FL
MICROSCOPIC COMMENT: NORMAL
NITRITE UR QL STRIP: NEGATIVE
PH UR STRIP: 6 [PH] (ref 5–8)
PLATELET # BLD AUTO: 290 K/UL
PMV BLD AUTO: 9.9 FL
POTASSIUM SERPL-SCNC: 3.9 MMOL/L
PROT UR QL STRIP: NEGATIVE
RBC # BLD AUTO: 4.45 M/UL
RBC #/AREA URNS AUTO: 1 /HPF (ref 0–4)
SODIUM SERPL-SCNC: 140 MMOL/L
SP GR UR STRIP: 1.02 (ref 1–1.03)
SQUAMOUS #/AREA URNS AUTO: 3 /HPF
URN SPEC COLLECT METH UR: NORMAL
UROBILINOGEN UR STRIP-ACNC: 4 EU/DL
WBC # BLD AUTO: 9.1 K/UL
WBC #/AREA URNS AUTO: 1 /HPF (ref 0–5)

## 2018-04-10 PROCEDURE — 80048 BASIC METABOLIC PNL TOTAL CA: CPT

## 2018-04-10 PROCEDURE — 99214 OFFICE O/P EST MOD 30 MIN: CPT | Mod: PBBFAC | Performed by: INTERNAL MEDICINE

## 2018-04-10 PROCEDURE — 81001 URINALYSIS AUTO W/SCOPE: CPT

## 2018-04-10 PROCEDURE — 99215 OFFICE O/P EST HI 40 MIN: CPT | Mod: S$PBB,,, | Performed by: INTERNAL MEDICINE

## 2018-04-10 PROCEDURE — 36415 COLL VENOUS BLD VENIPUNCTURE: CPT

## 2018-04-10 PROCEDURE — 82306 VITAMIN D 25 HYDROXY: CPT

## 2018-04-10 PROCEDURE — 99999 PR PBB SHADOW E&M-EST. PATIENT-LVL IV: CPT | Mod: PBBFAC,,, | Performed by: INTERNAL MEDICINE

## 2018-04-10 PROCEDURE — 85027 COMPLETE CBC AUTOMATED: CPT

## 2018-04-10 RX ORDER — ERGOCALCIFEROL 1.25 MG/1
50000 CAPSULE ORAL WEEKLY
Qty: 4 CAPSULE | Refills: 0 | Status: SHIPPED | OUTPATIENT
Start: 2018-04-10 | End: 2018-05-05 | Stop reason: SDUPTHER

## 2018-04-10 RX ORDER — ACETAMINOPHEN 500 MG
1 TABLET ORAL DAILY
COMMUNITY
End: 2020-05-04

## 2018-04-10 NOTE — TELEPHONE ENCOUNTER
Left voice message for pt to give the office a call back at [phone] to schedule consult for dysuria

## 2018-04-10 NOTE — PROGRESS NOTES
Subjective:       Patient ID: Edita Riley is a 53 y.o. female.    Chief Complaint: Back Pain (years) and Flank Pain (years)    HPI    Last visit with me 03/2017. Since then seen by Gastroenterology, Sleep Med, Gynecology, Neurology, Internal Medicine. Has gone thru Physical Therapy, also seen in ER for back pain last month. Reports pain in spine, also has bowel problems and ear pain. Has appointment in July with spine doctor for further evaluation. Reports dysuria and cloudy urine. Dark color, going on for 1 year. Also black stool, going on for years. Alternating diarrhea and constipation. Reports ear pain and tinnitus on left side. Also with urinary frequency.     Reviewed PMH, PSH, SH, FH, allergies, and medications.     Review of Systems    As per HPI    Objective:      Physical Exam   Constitutional: No distress.   -American woman whose Body mass index is 36.08 kg/m².    HENT:   Right Ear: Tympanic membrane normal. No tenderness. No middle ear effusion.   Left Ear: Tympanic membrane normal. No tenderness.  No middle ear effusion.   Mouth/Throat: Oropharynx is clear and moist. No oropharyngeal exudate.   Neck: No thyromegaly present.   Cardiovascular: Normal rate, regular rhythm and normal heart sounds.    Pulmonary/Chest: Effort normal and breath sounds normal. No stridor. She has no wheezes. She has no rales.   Abdominal: Soft. She exhibits no distension and no mass. There is tenderness (diffuse tenderness to moderate palpation throughout abdomen). There is no guarding.   Musculoskeletal: She exhibits tenderness (diffuse muscle tenderness to deep palpation). She exhibits no edema.        Lumbar back: She exhibits tenderness (paraspinal mm and overlying spine diffusely). She exhibits no swelling and no edema.   Lymphadenopathy:     She has no cervical adenopathy.   Psychiatric:   Affect generally restricted to frustration. Speech slightly dysarthric at baseline from facial hemiparesis.  "Thought process linear.   Nursing note and vitals reviewed.      Vitals:    04/10/18 1400   BP: 122/82   BP Location: Right arm   Patient Position: Sitting   BP Method: Large (Manual)   Pulse: 96   Weight: 104.5 kg (230 lb 6.1 oz)   Height: 5' 7" (1.702 m)     Body mass index is 36.08 kg/m².    RESULTS: Reviewed labs and urine results from last 12 months. Reviewed CT scan abdomen 09/2017.    Assessment:       1. Chronic abdominal pain    2. Chronic midline low back pain without sciatica    3. Chronic midline thoracic back pain    4. Dysuria    5. Elevated serum creatinine    6. Sleep stage dysfunction    7. Melena    8. Vitamin D deficiency    9. Left ear pain        Plan:   Edita was seen today for back pain and flank pain.    Diagnoses and all orders for this visit:    Chronic abdominal pain:  Long-standing problem, unclear etiology.  Patient has had upper endoscopy and routine follow-up with gastroenterology.  They have been notified via the phone of her ongoing complaints.  Possibly related to vitamin D deficiency, start supplementation.    Chronic midline low back pain without sciatica:  Long standing problem.  Pain is persisting and has not improved.  Has been going through rehabilitation.  Has upcoming appointment for orthopedics.  Reports she had an MRI at [facility], I am unable to access these records at this time.  I will fax a release of records requested to [facility] for the results.  -     Cancel: X-Ray Lumbar Spine Ap And Lateral; Future    Chronic midline thoracic back pain:  Long standing problem, not improved, request MRI records.  -     Cancel: X-Ray Thoracic Spine AP Lateral; Future    Dysuria:  Prior diagnosis, long-standing problem.  Recent urine tests in the emergency room were negative.  Check urinalysis again.  Refer to Urogyn for evaluation, possibly interstitial cystitis.  -     Urinalysis  -     Ambulatory consult to Urogynecology    Elevated serum creatinine:  New problem, seen on recent labs, " only mild Cr elevated, possibly secondary to dehydration, recheck today on labs.  -     Basic metabolic panel; Future    Sleep stage dysfunction:  Prior dx, seen on PSG in June 2017, unclear if problem has resolved, if sleep problems recur in future refer to Sleep Med.    Melena:  Prior dx, pt reports years of black stool but colonoscopy and EGD were negative in last 2 years. FOBT in ER negative. Check for chronic blood loss on CBC.  -     CBC Without Differential; Future    Vitamin D deficiency:  New problem, start daily supplement and take D2 for 4 weeks.  -     Vitamin D; Future  -     ergocalciferol (ERGOCALCIFEROL) 50,000 unit Cap; Take 1 capsule (50,000 Units total) by mouth once a week.    Left ear pain:  Chronic per the patient, however no evidence of otitis, no tenderness to palpation on exam. Unclear etiology, if persists refer to ENT for evaluation.    Follow-up in about 6 months (around 10/10/2018).  Hammad Lackey MD  Internal Medicine    Portions of this note were completed using [employer] dictation software. Please excuse typographical or syntax errors.

## 2018-04-10 NOTE — PATIENT INSTRUCTIONS
Dr Lackey will send in a weekly vitamin D2 supplement; take 1 capsule once a week for 4 weeks. Please start a daily Vitamin D3 supplement, taking 2000 (two thousand) units (IU) once a day. This can be found over-the-counter.    Please review with your Social Security team what paperwork is required for your disability evaluation.

## 2018-04-10 NOTE — TELEPHONE ENCOUNTER
----- Message from Dana Garcia sent at 4/10/2018  3:14 PM CDT -----  Patient needs appt with Urogyn

## 2018-04-11 NOTE — TELEPHONE ENCOUNTER
Please call the patient to notify that her vitamin D levels are low, she should continue the supplements as discussed at our visit. Her kidneys and urine function are normal. All other labs are normal as well.

## 2018-04-12 ENCOUNTER — TELEPHONE (OUTPATIENT)
Dept: INTERNAL MEDICINE | Facility: CLINIC | Age: 53
End: 2018-04-12

## 2018-04-12 NOTE — TELEPHONE ENCOUNTER
No sign of urine infection, no treatment recommendations at this time as I'm not sure why she is having the problems. She has been referred to urogyn for this problem. She was called today and they left a message for her to call their clinic at [phone] to schedule an appointment. Please encourage her to do the same.

## 2018-04-12 NOTE — TELEPHONE ENCOUNTER
----- Message from Kayla Hull RN sent at 4/12/2018  4:21 PM CDT -----  Contact: [phone]      ----- Message -----  From: Aarti Mitchell  Sent: 4/12/2018   4:01 PM  To: [facility]ue[facility] Staff    Patient would like to get medical advice.  Symptoms (please be specific):  Burning when urination   How long has patient had these symptoms:  2 years  Pharmacy name and phone #:  n/a  Any drug allergies:  n/a  Comments: Wants to talk to

## 2018-04-16 ENCOUNTER — OFFICE VISIT (OUTPATIENT)
Dept: OBSTETRICS AND GYNECOLOGY | Facility: CLINIC | Age: 53
End: 2018-04-16
Payer: MEDICAID

## 2018-04-16 VITALS
HEIGHT: 67 IN | WEIGHT: 231.69 LBS | DIASTOLIC BLOOD PRESSURE: 84 MMHG | SYSTOLIC BLOOD PRESSURE: 138 MMHG | BODY MASS INDEX: 36.36 KG/M2

## 2018-04-16 DIAGNOSIS — R30.0 DYSURIA: ICD-10-CM

## 2018-04-16 DIAGNOSIS — N89.8 VAGINAL IRRITATION: Primary | ICD-10-CM

## 2018-04-16 PROCEDURE — 87480 CANDIDA DNA DIR PROBE: CPT

## 2018-04-16 PROCEDURE — 87086 URINE CULTURE/COLONY COUNT: CPT

## 2018-04-16 PROCEDURE — 99213 OFFICE O/P EST LOW 20 MIN: CPT | Mod: S$PBB,,, | Performed by: OBSTETRICS & GYNECOLOGY

## 2018-04-16 PROCEDURE — 99212 OFFICE O/P EST SF 10 MIN: CPT | Mod: PBBFAC | Performed by: OBSTETRICS & GYNECOLOGY

## 2018-04-16 PROCEDURE — 99999 PR PBB SHADOW E&M-EST. PATIENT-LVL II: CPT | Mod: PBBFAC,,, | Performed by: OBSTETRICS & GYNECOLOGY

## 2018-04-16 PROCEDURE — 87510 GARDNER VAG DNA DIR PROBE: CPT

## 2018-04-16 PROCEDURE — 87088 URINE BACTERIA CULTURE: CPT

## 2018-04-16 NOTE — LETTER
April 19, 2018      Hammad Lackey MD  [address]  [facility] [address]           [facility] [address]  [address]  [address]  [facility] [address]  Phone: [phone]  Fax: [phone]          Patient: Edita Riley   MR Number: [id number]   YOB: 1965   Date of Visit: 4/16/2018       Dear Dr. Hammad Lackey:    Thank you for referring Edita Riley to me for evaluation. Attached you will find relevant portions of my assessment and plan of care.    If you have questions, please do not hesitate to call me. I look forward to following Edita Riley along with you.    Sincerely,        Enclosure  CC:  No Recipients    If you would like to receive this communication electronically, please contact [email or web address] or [phone] to request more information on [employer] access.    For providers and/or their staff who would like to refer a patient to [facility], please contact us through our one-stop-shop provider referral line, Critical access hospitalierge, at [phone].    If you feel you have received this communication in error or would no longer like to receive these types of communications, please e-mail [email or web address]

## 2018-04-16 NOTE — PROGRESS NOTES
Chief Complaint   Patient presents with    Well Woman       HPI:  53 y.o. female     No LMP recorded. Patient has had a hysterectomy.    - s/p hysterectomy, BSO in / for possible cervical cancer; patient did not have radiation treatment  - Pap in  and  was normal    - Patient uses estrace vaginal cream for atrophic vaginitis    - Today she presents with vaginal irritation and dysuria for at least the past week  - Recent urinalysis was normal    Contraception: s/p hysterectomy  Pap: 2017, NILM  Mammogram: 2017, BIRADS1    Past Medical History:   Diagnosis Date    Cervical cancer     Chronic back pain     Fibromyalgia     GERD (gastroesophageal reflux disease)     Hiatal hernia     Hypertension     Lactose intolerance     Osteoarthritis of back     Stroke      Past Surgical History:   Procedure Laterality Date    BILATERAL OOPHORECTOMY      CHOLECYSTECTOMY  2016    Done at [facility], path showed chronic cholecystitis and gallstones    COLONOSCOPY      COLONOSCOPY N/A 10/24/2016    at [facility], Dr Thomas    ESOPHAGOGASTRODUODENOSCOPY      HYSTERECTOMY  2015    OOPHORECTOMY         Social History   Substance Use Topics    Smoking status: Never Smoker    Smokeless tobacco: Never Used    Alcohol use No     Family History   Problem Relation Age of Onset    Heart disease Sister     Heart disease Maternal Grandmother     Colon cancer Father     Esophageal cancer Father     Cancer Father      Lung    Cancer Mother      Cervical    Rectal cancer Neg Hx     Stomach cancer Neg Hx     Crohn's disease Neg Hx     Ulcerative colitis Neg Hx      OB History    Para Term  AB Living   1 1 0     2   SAB TAB Ectopic Multiple Live Births         2        # Outcome Date GA Lbr Yeison/2nd Weight Sex Delivery Anes PTL Lv   1 Para                   MEDICATIONS: Reviewed with patient.  ALLERGIES: Bee sting [allergen ext-venom-honey bee] and Grass  "pollen-bermuda, standard     ROS:  Review of Systems   Constitutional: Negative for fever.   Respiratory: Negative for shortness of breath.    Cardiovascular: Negative for chest pain.   Gastrointestinal: Negative for abdominal pain, nausea and vomiting.   Genitourinary: Positive for dysuria and vaginal discharge.   Neurological: Negative for headaches.       PHYSICAL EXAM:    /84   Ht 5' 7" (1.702 m)   Wt 105.1 kg (231 lb 11.3 oz)   BMI 36.29 kg/m²     Physical Exam:   Constitutional: She is oriented to person, place, and time. She appears well-developed.    HENT:   Head: Normocephalic.       Pulmonary/Chest: Effort normal.          Genitourinary: Cervix is normal. Vaginal discharge found. Cervix exhibits absence.   Genitourinary Comments: External genitalia: Normal  Urethra: No tenderness; normal meatus  Bladder: No tenderness               Neurological: She is alert and oriented to person, place, and time.     Psychiatric: She has a normal mood and affect.         ASSESSMENT & PLAN:   Vaginal irritation  -     Vaginosis Screen by DNA Probe    Dysuria  -     Urine culture          - Vaginal discharge on exam  - Check vaginosis screen and treat based on results    - Check urine culture  "

## 2018-04-18 ENCOUNTER — TELEPHONE (OUTPATIENT)
Dept: OBSTETRICS AND GYNECOLOGY | Facility: CLINIC | Age: 53
End: 2018-04-18

## 2018-04-18 DIAGNOSIS — B37.31 VAGINITIS DUE TO CANDIDA: Primary | ICD-10-CM

## 2018-04-18 LAB — BACTERIA UR CULT: NORMAL

## 2018-04-18 RX ORDER — FLUCONAZOLE 150 MG/1
150 TABLET ORAL ONCE
Qty: 1 TABLET | Refills: 0 | Status: SHIPPED | OUTPATIENT
Start: 2018-04-18 | End: 2018-04-18

## 2018-04-24 ENCOUNTER — TELEPHONE (OUTPATIENT)
Dept: GASTROENTEROLOGY | Facility: CLINIC | Age: 53
End: 2018-04-24

## 2018-04-24 NOTE — TELEPHONE ENCOUNTER
----- Message from Ashley Murry MA sent at 4/23/2018  1:51 PM CDT -----  Contact: self  [phone]  States she is calling regarding if you received the MRI from [facility].  States they were to sent it to you since you ordered it.

## 2018-04-25 ENCOUNTER — TELEPHONE (OUTPATIENT)
Dept: GASTROENTEROLOGY | Facility: CLINIC | Age: 53
End: 2018-04-25

## 2018-04-25 DIAGNOSIS — R93.89 ABNORMAL MRI: Primary | ICD-10-CM

## 2018-04-25 NOTE — TELEPHONE ENCOUNTER
----- Message from Arden Gutiérrez MD sent at 4/25/2018  1:37 PM CDT -----  MRI scan showed a disc bulge and an enlarged lymph nose. Will refer to Back specialist  for evaluation. A CT scan will be done to evaluate the lymph node. Please schedule consult with Back specialist and CT scan.

## 2018-04-27 ENCOUNTER — HOSPITAL ENCOUNTER (OUTPATIENT)
Dept: RADIOLOGY | Facility: HOSPITAL | Age: 53
Discharge: HOME | End: 2018-04-27
Attending: INTERNAL MEDICINE
Payer: MEDICAID

## 2018-04-27 DIAGNOSIS — R93.89 ABNORMAL MRI: ICD-10-CM

## 2018-04-27 PROCEDURE — 25500020 PHARM REV CODE 255: Performed by: INTERNAL MEDICINE

## 2018-04-27 PROCEDURE — 74177 CT ABD & PELVIS W/CONTRAST: CPT | Mod: TC

## 2018-04-27 PROCEDURE — 74177 CT ABD & PELVIS W/CONTRAST: CPT | Mod: 26,,, | Performed by: RADIOLOGY

## 2018-04-27 RX ADMIN — IOHEXOL 15 ML: 350 INJECTION, SOLUTION INTRAVENOUS at 04:04

## 2018-04-27 RX ADMIN — IOHEXOL 100 ML: 350 INJECTION, SOLUTION INTRAVENOUS at 05:04

## 2018-04-27 RX ADMIN — IOHEXOL 15 ML: 350 INJECTION, SOLUTION INTRAVENOUS at 03:04

## 2018-04-28 DIAGNOSIS — R93.5 ABNORMAL ABDOMINAL CT SCAN: Primary | ICD-10-CM

## 2018-04-28 NOTE — PROGRESS NOTES
Please notify patient, the CT scan revealed enlarged lymph nodes. Will schedule colonoscopy first (order placed).

## 2018-04-30 ENCOUNTER — TELEPHONE (OUTPATIENT)
Dept: GASTROENTEROLOGY | Facility: CLINIC | Age: 53
End: 2018-04-30

## 2018-04-30 DIAGNOSIS — Z12.11 SPECIAL SCREENING FOR MALIGNANT NEOPLASMS, COLON: Primary | ICD-10-CM

## 2018-04-30 RX ORDER — POLYETHYLENE GLYCOL 3350, SODIUM SULFATE ANHYDROUS, SODIUM BICARBONATE, SODIUM CHLORIDE, POTASSIUM CHLORIDE 236; 22.74; 6.74; 5.86; 2.97 G/4L; G/4L; G/4L; G/4L; G/4L
4 POWDER, FOR SOLUTION ORAL ONCE
Qty: 4000 ML | Refills: 0 | Status: SHIPPED | OUTPATIENT
Start: 2018-04-30 | End: 2018-04-30

## 2018-04-30 NOTE — TELEPHONE ENCOUNTER
Ma spoke with pt ,test results given pt verbalized understanding, pt was transferred to endo to schedule her procedure

## 2018-04-30 NOTE — TELEPHONE ENCOUNTER
----- Message from Arden Gutiérrez MD sent at 4/28/2018 10:26 AM CDT -----  Please notify patient, the CT scan revealed enlarged lymph nodes. Will schedule colonoscopy first (order placed).

## 2018-05-02 ENCOUNTER — TELEPHONE (OUTPATIENT)
Dept: OBSTETRICS AND GYNECOLOGY | Facility: CLINIC | Age: 53
End: 2018-05-02

## 2018-05-02 DIAGNOSIS — N30.00 ACUTE CYSTITIS WITHOUT HEMATURIA: Primary | ICD-10-CM

## 2018-05-02 RX ORDER — SULFAMETHOXAZOLE AND TRIMETHOPRIM 800; 160 MG/1; MG/1
1 TABLET ORAL 2 TIMES DAILY
Qty: 14 TABLET | Refills: 0 | Status: SHIPPED | OUTPATIENT
Start: 2018-05-02 | End: 2018-05-09

## 2018-05-03 NOTE — TELEPHONE ENCOUNTER
Called pt and informed pt that urine showed infection and that medication was sent to her pharmacy. Pt verbalized understanding

## 2018-05-05 DIAGNOSIS — E55.9 VITAMIN D DEFICIENCY: ICD-10-CM

## 2018-05-07 RX ORDER — ERGOCALCIFEROL 1.25 MG/1
50000 CAPSULE ORAL WEEKLY
Qty: 12 CAPSULE | Refills: 3 | Status: SHIPPED | OUTPATIENT
Start: 2018-05-07 | End: 2018-06-06

## 2018-05-10 DIAGNOSIS — E55.9 VITAMIN D DEFICIENCY: ICD-10-CM

## 2018-05-11 RX ORDER — ERGOCALCIFEROL 1.25 MG/1
50000 CAPSULE ORAL WEEKLY
Qty: 4 CAPSULE | Refills: 0 | OUTPATIENT
Start: 2018-05-11 | End: 2018-06-10

## 2018-05-18 ENCOUNTER — ANESTHESIA EVENT (OUTPATIENT)
Dept: ENDOSCOPY | Facility: HOSPITAL | Age: 53
End: 2018-05-18
Payer: MEDICAID

## 2018-05-18 ENCOUNTER — HOSPITAL ENCOUNTER (OUTPATIENT)
Facility: HOSPITAL | Age: 53
Discharge: HOME | End: 2018-05-18
Attending: INTERNAL MEDICINE | Admitting: INTERNAL MEDICINE
Payer: MEDICAID

## 2018-05-18 ENCOUNTER — SURGERY (OUTPATIENT)
Age: 53
End: 2018-05-18

## 2018-05-18 ENCOUNTER — ANESTHESIA (OUTPATIENT)
Dept: ENDOSCOPY | Facility: HOSPITAL | Age: 53
End: 2018-05-18
Payer: MEDICAID

## 2018-05-18 VITALS
BODY MASS INDEX: 36.26 KG/M2 | SYSTOLIC BLOOD PRESSURE: 143 MMHG | TEMPERATURE: 98 F | RESPIRATION RATE: 18 BRPM | DIASTOLIC BLOOD PRESSURE: 72 MMHG | HEART RATE: 86 BPM | WEIGHT: 231 LBS | OXYGEN SATURATION: 98 % | HEIGHT: 67 IN

## 2018-05-18 DIAGNOSIS — R93.5 ABNORMAL CT OF THE ABDOMEN: Primary | ICD-10-CM

## 2018-05-18 DIAGNOSIS — R59.0 RETROPERITONEAL LYMPHADENOPATHY: Primary | ICD-10-CM

## 2018-05-18 PROCEDURE — 45380 COLONOSCOPY AND BIOPSY: CPT | Performed by: INTERNAL MEDICINE

## 2018-05-18 PROCEDURE — 37000008 HC ANESTHESIA 1ST 15 MINUTES: Performed by: INTERNAL MEDICINE

## 2018-05-18 PROCEDURE — 25000003 PHARM REV CODE 250: Performed by: INTERNAL MEDICINE

## 2018-05-18 PROCEDURE — 63600175 PHARM REV CODE 636 W HCPCS: Performed by: NURSE ANESTHETIST, CERTIFIED REGISTERED

## 2018-05-18 PROCEDURE — 88305 TISSUE EXAM BY PATHOLOGIST: CPT | Mod: 26,,, | Performed by: PATHOLOGY

## 2018-05-18 PROCEDURE — 27201012 HC FORCEPS, HOT/COLD, DISP: Performed by: INTERNAL MEDICINE

## 2018-05-18 PROCEDURE — 45380 COLONOSCOPY AND BIOPSY: CPT | Mod: ,,, | Performed by: INTERNAL MEDICINE

## 2018-05-18 PROCEDURE — 00811 ANES LWR INTST NDSC NOS: CPT | Performed by: INTERNAL MEDICINE

## 2018-05-18 PROCEDURE — 37000009 HC ANESTHESIA EA ADD 15 MINS: Performed by: INTERNAL MEDICINE

## 2018-05-18 PROCEDURE — E9220 PRA ENDO ANESTHESIA: HCPCS | Mod: ,,, | Performed by: NURSE ANESTHETIST, CERTIFIED REGISTERED

## 2018-05-18 PROCEDURE — 88305 TISSUE EXAM BY PATHOLOGIST: CPT | Performed by: PATHOLOGY

## 2018-05-18 RX ORDER — SODIUM CHLORIDE 9 MG/ML
INJECTION, SOLUTION INTRAVENOUS CONTINUOUS
Status: DISCONTINUED | OUTPATIENT
Start: 2018-05-18 | End: 2018-05-18 | Stop reason: HOSPADM

## 2018-05-18 RX ORDER — PROPOFOL 10 MG/ML
VIAL (ML) INTRAVENOUS
Status: DISCONTINUED | OUTPATIENT
Start: 2018-05-18 | End: 2018-05-18

## 2018-05-18 RX ORDER — PROPOFOL 10 MG/ML
VIAL (ML) INTRAVENOUS CONTINUOUS PRN
Status: DISCONTINUED | OUTPATIENT
Start: 2018-05-18 | End: 2018-05-18

## 2018-05-18 RX ORDER — LIDOCAINE HCL/PF 100 MG/5ML
SYRINGE (ML) INTRAVENOUS
Status: DISCONTINUED | OUTPATIENT
Start: 2018-05-18 | End: 2018-05-18

## 2018-05-18 RX ADMIN — SODIUM CHLORIDE: 9 INJECTION, SOLUTION INTRAVENOUS at 01:05

## 2018-05-18 RX ADMIN — PROPOFOL 80 MG: 10 INJECTION, EMULSION INTRAVENOUS at 01:05

## 2018-05-18 RX ADMIN — PROPOFOL 20 MG: 10 INJECTION, EMULSION INTRAVENOUS at 01:05

## 2018-05-18 RX ADMIN — PROPOFOL 150 MCG/KG/MIN: 10 INJECTION, EMULSION INTRAVENOUS at 01:05

## 2018-05-18 RX ADMIN — LIDOCAINE HYDROCHLORIDE 50 MG: 20 INJECTION, SOLUTION INTRAVENOUS at 01:05

## 2018-05-18 NOTE — TRANSFER OF CARE
"Anesthesia Transfer of Care Note    [facility]    Procedure(s) Performed: Procedure(s) (LRB):  COLONOSCOPY (N/A)    Patient location: PACU    Anesthesia Type: general    Transport from OR: Transported from OR on 6-10 L/min O2 by face mask with adequate spontaneous ventilation    Post pain: adequate analgesia    Post assessment: no apparent anesthetic complications and tolerated procedure well    Post vital signs: stable    Level of consciousness: awake and alert    Nausea/Vomiting: no nausea/vomiting    Complications: none    Transfer of care protocol was followed      Last vitals:   Visit Vitals  /68 (BP Location: Left arm, Patient Position: Lying)   Pulse 92   Temp 36.7 °C (98.1 °F) (Temporal)   Resp 16   Ht 5' 7" (1.702 m)   Wt 104.8 kg (231 lb)   SpO2 97%   Breastfeeding? No   BMI 36.18 kg/m²     "

## 2018-05-18 NOTE — H&P
Short Stay Endoscopy History and Physical    PCP - Hammad Lackey MD    Procedure - Colonoscopy  Sedation: GA  ASA - per anesthesia  Mallampati - per anesthesia  History of Anesthesia problems - no  Family history Anesthesia problems -  no     HPI:  This is a 53 y.o. female here for evaluation of : Abnormal CT scan with lymphadenopathy    Reflux - no  Dysphagia - no  Abdominal pain - yes  Diarrhea - no    ROS:  Constitutional: No fevers, chills, No weight loss  ENT: No allergies  CV: No chest pain  Pulm: No cough, No shortness of breath  Ophtho: No vision changes  GI: see HPI  Medical History:  has a past medical history of Cervical cancer (2014); Chronic back pain; Fibromyalgia; GERD (gastroesophageal reflux disease); Hiatal hernia (2014); Hypertension; Lactose intolerance; Osteoarthritis of back; and Stroke (1972).    Surgical History:  has a past surgical history that includes Hysterectomy (2015); Esophagogastroduodenoscopy (2014); Colonoscopy (2014); Bilateral oophorectomy (2015); Colonoscopy (N/A, 10/24/2016); Cholecystectomy (11/09/2016); and Oophorectomy.    Family History: family history includes Cancer in her father and mother; Colon cancer in her father; Esophageal cancer in her father; Heart disease in her maternal grandmother and sister.. Otherwise no colon cancer, inflammatory bowel disease, or GI malignancies.    Social History:  reports that she has never smoked. She has never used smokeless tobacco. She reports that she does not drink alcohol or use drugs.    Review of patient's allergies indicates:   Allergen Reactions    Bee sting [allergen ext-venom-honey bee]      Rash      Grass pollen-bermuda, standard      rash       Medications:   Prescriptions Prior to Admission   Medication Sig Dispense Refill Last Dose    cholecalciferol, vitamin D3, 2,000 unit Cap Take 1 capsule by mouth once daily.   5/17/2018 at Unknown time    cyclobenzaprine (FLEXERIL) 10 MG tablet TAKE 1 TABLET (10 MG TOTAL) BY  MOUTH NIGHTLY AS NEEDED FOR MUSCLE SPASMS.  1 5/17/2018 at Unknown time    duloxetine (CYMBALTA) 60 MG capsule Take 1 capsule (60 mg total) by mouth once daily. 90 capsule 3 5/17/2018 at Unknown time    gabapentin (NEURONTIN) 300 MG capsule Take 1 capsule (300 mg total) by mouth 2 (two) times daily. 180 capsule 1 5/17/2018 at Unknown time    lisinopril 10 MG tablet Take 1 tablet (10 mg total) by mouth once daily. 90 tablet 3 5/17/2018 at Unknown time    meclizine (ANTIVERT) 25 mg tablet Take 1 tablet (25 mg total) by mouth 3 (three) times daily as needed for Dizziness. 30 tablet 6 5/17/2018 at Unknown time    omeprazole (PRILOSEC) 40 MG capsule Take 1 capsule (40 mg total) by mouth once daily. 90 capsule 3 5/17/2018 at Unknown time    sucralfate (CARAFATE) 1 gram tablet Take 1 tablet (1 g total) by mouth 2 (two) times daily. 180 tablet 3 5/17/2018 at Unknown time    traMADol (ULTRAM) 50 mg tablet Take 50 mg by mouth every 6 (six) hours as needed for Pain.   5/17/2018 at Unknown time    traZODone (DESYREL) 50 MG tablet Take 1 tablet (50 mg total) by mouth every evening. 90 tablet 3 5/17/2018 at Unknown time    VITAMIN D2 50,000 unit capsule TAKE 1 CAPSULE (50,000 UNITS TOTAL) BY MOUTH ONCE A WEEK. 12 capsule 3 5/17/2018 at Unknown time    arm brace (NEOPRENE WRIST SPLINT SUPPORT) Misc 1 Units by Misc.(Non-Drug; Combo Route) route every evening. 1 each 0 Taking    conjugated estrogens (PREMARIN) vaginal cream Place 0.5 g vaginally twice a week. 45 g 1 Taking    estradiol (ESTRACE) 1 MG tablet Take 1 mg by mouth once daily.  11 Taking       Objective Findings:    Vital Signs: Per nursing notes.    Physical Exam:  General Appearance: Well appearing in no acute distress  Head:   Normocephalic, without obvious abnormality  Eyes:    No scleral icterus  Airway: Open  Neck: No restriction in mobility  Lungs: CTA bilaterally in anterior and posterior fields, no wheezes, no crackles.  Heart:  Regular rate and  rhythm, S1, S2 normal, no murmurs heard  Abdomen: Soft, non tender, non distended      Labs:  Lab Results   Component Value Date    WBC 9.10 04/10/2018    HGB 12.6 04/10/2018    HCT 39.5 04/10/2018     04/10/2018    CHOL 236 (H) 11/16/2017    TRIG 233 (H) 11/16/2017    HDL 80 (H) 11/16/2017    ALT 17 09/30/2017    AST 18 09/30/2017     04/10/2018    K 3.9 04/10/2018     04/10/2018    CREATININE 0.9 04/10/2018    BUN 11 04/10/2018    CO2 28 04/10/2018    TSH 0.869 11/16/2017    GLUF 94 09/20/2016    HGBA1C 5.6 11/16/2017         I have explained the risks and benefits of endoscopy procedures to the patient including but not limited to bleeding, perforation, infection, and death.    Thank you so much for allowing me to participate in the care of Edita Gutiérrez MD

## 2018-05-18 NOTE — DISCHARGE INSTRUCTIONS
Colonoscopy     A camera attached to a flexible tube with a viewing lens is used to take video pictures.     Colonoscopy is a test to view the inside of your lower digestive tract (colon and rectum). Sometimes it can show the last part of the small intestine (ileum). During the test, small pieces of tissue may be removed for testing. This is called a biopsy. Small growths, such as polyps, may also be removed.   Why is colonoscopy done?  The test is done to help look for colon cancer. And it can help find the source of abdominal pain, bleeding, and changes in bowel habits. It may be needed once a year, depending on factors such as your:  · Age  · Health history  · Family health history  · Symptoms  · Results from any prior colonoscopy  Risks and possible complications  These include:  · Bleeding               · A puncture or tear in the colon   · Risks of anesthesia  · A cancer lesion not being seen  Getting ready   To prepare for the test:  · Talk with your healthcare provider about the risks of the test (see below). Also ask your healthcare provider about alternatives to the test.  · Tell your healthcare provider about any medicines you take. Also tell him or her about any health conditions you may have.  · Make sure your rectum and colon are empty for the test. Follow the diet and bowel prep instructions exactly. If you dont, the test may need to be rescheduled.  · Plan for a friend or family member to drive you home after the test.     Colonoscopy provides an inside view of the entire colon.     You may discuss the results with your doctor right away or at a future visit.  During the test   The test is usually done in the hospital on an outpatient basis. This means you go home the same day. The procedure takes about 30 minutes. During that time:  · You are given relaxing (sedating) medicine through an IV line. You may be drowsy, or fully asleep.  · The healthcare provider will first give you a physical exam to  check for anal and rectal problems.  · Then the anus is lubricated and the scope inserted.  · If you are awake, you may have a feeling similar to needing to have a bowel movement. You may also feel pressure as air is pumped into the colon. Its OK to pass gas during the procedure.  · Biopsy, polyp removal, or other treatments may be done during the test.  After the test   You may have gas right after the test. It can help to try to pass it to help prevent later bloating. Your healthcare provider may discuss the results with you right away. Or you may need to schedule a follow-up visit to talk about the results. After the test, you can go back to your normal eating and other activities. You may be tired from the sedation and need to rest for a few hours.  Date Last Reviewed: 11/1/2016 © 2000-2017 [employer]. [address] [place] [address], [place] [address]. All rights reserved. This information is not intended as a substitute for professional medical care. Always follow your healthcare professional's instructions.

## 2018-05-18 NOTE — ANESTHESIA PREPROCEDURE EVALUATION
05/18/2018  Edita Riley is a 53 y.o., female.  Patient Active Problem List   Diagnosis    Osteoarthritis of back    Hiatal hernia    Essential hypertension    Lower extremity pain, diffuse    Weakness of both lower extremities    Impaired functional mobility, balance, gait, and endurance    Schatzki's ring    Colon polyp    Internal hemorrhoids    Cardiovascular event risk -- low    Hemifacial spasm    Depression    Fibromyalgia    Facial palsy    Blood in stool    Sleep stage dysfunction    Carpal tunnel syndrome on right    Weakness    Difficulty walking    Gastroesophageal reflux disease with esophagitis    Abnormal CT of the abdomen     Past Medical History:   Diagnosis Date    Cervical cancer 2014    Chronic back pain     Fibromyalgia     GERD (gastroesophageal reflux disease)     Hiatal hernia 2014    Hypertension     Lactose intolerance     Osteoarthritis of back     Stroke 1972         Anesthesia Evaluation    I have reviewed the Patient Summary Reports.     I have reviewed the Medications.     Review of Systems  Anesthesia Hx:  No problems with previous Anesthesia Denies Hx of Anesthetic complications  Denies Family Hx of Anesthesia complications.   Denies Personal Hx of Anesthesia complications.   Hematology/Oncology:  Hematology Normal   Oncology Normal     EENT/Dental:EENT/Dental Normal   Cardiovascular:   Exercise tolerance: good Hypertension    Pulmonary:  Pulmonary Normal    Renal/:  Renal/ Normal     Hepatic/GI:   Bowel Prep. Hiatal Hernia, GERD    Musculoskeletal:   Arthritis     Neurological:   CVA (left sided weakness), residual symptoms Neuromuscular Disease,    Endocrine:  Endocrine Normal    Dermatological:  Skin Normal    Psych:  Psychiatric Normal           Physical Exam  General:  Well nourished    Airway/Jaw/Neck:  Airway Findings:  Mouth Opening: Normal Tongue: Normal  General Airway Assessment: Adult  Mallampati: III  TM Distance: Normal, at least 6 cm  Jaw/Neck Findings:  Neck ROM: Normal ROM     Eyes/Ears/Nose:  EYES/EARS/NOSE FINDINGS: Normal   Dental:  Dental Findings: In tact    Chest/Lungs:  Chest/Lungs Findings: Clear to auscultation, Normal Respiratory Rate     Heart/Vascular:  Heart Findings: Rate: Normal  Sounds: Normal     Abdomen:  Abdomen Findings: Normal    Musculoskeletal:  Musculoskeletal Findings: Normal   Skin:  Skin Findings: Normal    Mental Status:  Mental Status Findings:  Cooperative, Alert and Oriented         Anesthesia Plan  Type of Anesthesia, risks & benefits discussed:  Anesthesia Type:  general  Patient's Preference: General  Intra-op Monitoring Plan: standard ASA monitors  Intra-op Monitoring Plan Comments:   Post Op Pain Control Plan:   Post Op Pain Control Plan Comments:   Induction:   IV  Beta Blocker:  Patient is not currently on a Beta-Blocker (No further documentation required).       Informed Consent: Patient understands risks and agrees with Anesthesia plan.  Questions answered. Anesthesia consent signed with patient.  ASA Score: 3     Day of Surgery Review of History & Physical: I have interviewed and examined the patient. I have reviewed the patient's H&P dated:  There are no significant changes.  H&P update referred to the provider.     Anesthesia Plan Notes: NPO confirmed        Ready For Surgery From Anesthesia Perspective.

## 2018-05-18 NOTE — [employer] PATIENT INSTRUCTIONS
Discharge Summary/Instructions after an Endoscopic Procedure  Patient Name: Edita Riley  Patient MRN: [id number]  Patient YOB: 1965  Friday, May 18, 2018  Arden Gutiérrez MD  RESTRICTIONS:  During your procedure today, you received medications for sedation.  These   medications may affect your judgment, balance and coordination.  Therefore,   for 24 hours, you have the following restrictions:   - DO NOT drive a car, operate machinery, make legal/financial decisions,   sign important papers or drink alcohol.    ACTIVITY:  The following day: return to full activity including work, except no heavy   lifting, straining or running for 3 days if polyps were removed.  DIET:  Eat and drink normally unless instructed otherwise.     TREATMENT FOR COMMON SIDE EFFECTS:  - Mild abdominal pain, nausea, belching, bloating or excessive gas:  rest,   eat lightly and use a heating pad.  - Sore Throat: treat with throat lozenges and/or gargle with warm salt   water.  - Because air was used during the procedure, expelling large amounts of air   from your rectum or belching is normal.  - If a bowel prep was taken, you may not have a bowel movement for 1-3 days.    This is normal.  SYMPTOMS TO WATCH FOR AND REPORT TO YOUR PHYSICIAN:  1. Abdominal pain or bloating, other than gas cramps.  2. Chest pain.  3. Back pain.  4. Signs of infection such as: chills or fever occurring within 24 hours   after the procedure.  5. Rectal bleeding, which would show as bright red, maroon, or black stools.   (A tablespoon of blood from the rectum is not serious, especially if   hemorrhoids are present.)  6. Vomiting.  7. Weakness or dizziness.  GO DIRECTLY TO THE NEAREST EMERGENCY ROOM IF YOU HAVE ANY OF THE FOLLOWING:      Difficulty breathing              Chills and/or fever over 101 F   Persistent vomiting and/or vomiting blood   Severe abdominal pain   Severe chest pain   Black, tarry stools   Bleeding- more than one  tablespoon   Any other symptom or condition that you feel may need urgent attention  Your doctor recommends these additional instructions:  If any biopsies were taken, your doctors clinic will contact you in 1 to 2   weeks with any results.  - Patient has a contact number available for emergencies.  The signs and   symptoms of potential delayed complications were discussed with the   patient.  Return to normal activities tomorrow.  Written discharge   instructions were provided to the patient.   - Discharge patient to home.   - Resume previous diet.   - Continue present medications.   - Refer to a gynecologist.     - Repeat colonoscopy in 5 years for surveillance.  For questions, problems or results please call your physician - Arden Gutiérrez MD at Work:  [phone].  [facility], EMERGENCY ROOM PHONE NUMBER: [phone]  IF A COMPLICATION OR EMERGENCY SITUATION ARISES AND YOU ARE UNABLE TO REACH   YOUR PHYSICIAN - GO DIRECTLY TO THE EMERGENCY ROOM.  Arden Gutiérrez MD  5/18/2018 2:01:17 PM  This report has been verified and signed electronically.  [employer]

## 2018-05-18 NOTE — ANESTHESIA POSTPROCEDURE EVALUATION
"Anesthesia Post Evaluation    [facility]    Procedure(s) Performed: Procedure(s) (LRB):  COLONOSCOPY (N/A)    Final Anesthesia Type: general  Patient location during evaluation: PACU  Patient participation: Yes- Able to Participate  Level of consciousness: awake and alert and oriented  Post-procedure vital signs: reviewed and stable  Pain management: adequate  Airway patency: patent  PONV status at discharge: No PONV  Anesthetic complications: no      Cardiovascular status: stable  Respiratory status: unassisted, spontaneous ventilation and room air  Hydration status: euvolemic  Follow-up not needed.        Visit Vitals  /68 (BP Location: Left arm, Patient Position: Lying)   Pulse 92   Temp 36.7 °C (98.1 °F) (Temporal)   Resp 16   Ht 5' 7" (1.702 m)   Wt 104.8 kg (231 lb)   SpO2 97%   Breastfeeding? No   BMI 36.18 kg/m²       Pain/Caitie Score: Pain Assessment Performed: Yes (5/18/2018  2:03 PM)  Presence of Pain: non-verbal indicators absent (5/18/2018  2:03 PM)  Caitie Score: 9 (5/18/2018  2:04 PM)      "

## 2018-05-19 DIAGNOSIS — E55.9 VITAMIN D DEFICIENCY: ICD-10-CM

## 2018-05-21 ENCOUNTER — TELEPHONE (OUTPATIENT)
Dept: GASTROENTEROLOGY | Facility: CLINIC | Age: 53
End: 2018-05-21

## 2018-05-21 RX ORDER — ERGOCALCIFEROL 1.25 MG/1
50000 CAPSULE ORAL WEEKLY
Qty: 4 CAPSULE | Refills: 0 | OUTPATIENT
Start: 2018-05-21 | End: 2018-06-20

## 2018-05-21 NOTE — TELEPHONE ENCOUNTER
----- Message from Arden Gutiérrez MD sent at 5/18/2018  2:09 PM CDT -----  Please schedule a visit with Gynecology for history of cervical cancer and lymphadenopathy.    Please schedule follow up with me in July.

## 2018-05-23 ENCOUNTER — TELEPHONE (OUTPATIENT)
Dept: GASTROENTEROLOGY | Facility: CLINIC | Age: 53
End: 2018-05-23

## 2018-05-23 RX ORDER — GABAPENTIN 300 MG/1
300 CAPSULE ORAL 2 TIMES DAILY
Qty: 180 CAPSULE | Refills: 1 | Status: SHIPPED | OUTPATIENT
Start: 2018-05-23 | End: 2018-07-26 | Stop reason: SDUPTHER

## 2018-05-23 NOTE — TELEPHONE ENCOUNTER
----- Message from Arden Gutiérrez MD sent at 5/23/2018  1:14 PM CDT -----  Please notify patient, the colon polyp was benign.

## 2018-05-25 ENCOUNTER — TELEPHONE (OUTPATIENT)
Dept: ENDOSCOPY | Facility: HOSPITAL | Age: 53
End: 2018-05-25

## 2018-05-26 DIAGNOSIS — E55.9 VITAMIN D DEFICIENCY: ICD-10-CM

## 2018-05-27 RX ORDER — ERGOCALCIFEROL 1.25 MG/1
50000 CAPSULE ORAL WEEKLY
Qty: 4 CAPSULE | Refills: 0 | OUTPATIENT
Start: 2018-05-27 | End: 2018-06-26

## 2018-06-08 ENCOUNTER — OFFICE VISIT (OUTPATIENT)
Dept: OBSTETRICS AND GYNECOLOGY | Facility: CLINIC | Age: 53
End: 2018-06-08
Payer: MEDICAID

## 2018-06-08 VITALS
SYSTOLIC BLOOD PRESSURE: 110 MMHG | HEIGHT: 67 IN | WEIGHT: 229.75 LBS | BODY MASS INDEX: 36.06 KG/M2 | DIASTOLIC BLOOD PRESSURE: 78 MMHG

## 2018-06-08 DIAGNOSIS — Z01.419 ENCOUNTER FOR GYNECOLOGICAL EXAMINATION WITHOUT ABNORMAL FINDING: Primary | ICD-10-CM

## 2018-06-08 DIAGNOSIS — Z85.41 HISTORY OF CERVICAL CANCER: ICD-10-CM

## 2018-06-08 DIAGNOSIS — Z90.710 VAGINAL PAP SMEAR FOLLOWING HYSTERECTOMY FOR MALIGNANCY: ICD-10-CM

## 2018-06-08 DIAGNOSIS — Z12.31 SCREENING MAMMOGRAM, ENCOUNTER FOR: ICD-10-CM

## 2018-06-08 DIAGNOSIS — Z08 VAGINAL PAP SMEAR FOLLOWING HYSTERECTOMY FOR MALIGNANCY: ICD-10-CM

## 2018-06-08 PROCEDURE — 87624 HPV HI-RISK TYP POOLED RSLT: CPT

## 2018-06-08 PROCEDURE — 99214 OFFICE O/P EST MOD 30 MIN: CPT | Mod: PBBFAC | Performed by: OBSTETRICS & GYNECOLOGY

## 2018-06-08 PROCEDURE — 88175 CYTOPATH C/V AUTO FLUID REDO: CPT

## 2018-06-08 PROCEDURE — 99396 PREV VISIT EST AGE 40-64: CPT | Mod: S$PBB,,, | Performed by: OBSTETRICS & GYNECOLOGY

## 2018-06-08 PROCEDURE — 99999 PR PBB SHADOW E&M-EST. PATIENT-LVL IV: CPT | Mod: PBBFAC,,, | Performed by: OBSTETRICS & GYNECOLOGY

## 2018-06-08 NOTE — LETTER
June 8, 2018      Arden Gutiérrez MD  [address]  [facility] [address]           [facility] 5th Floor  [address]  [facility] [address]  Phone: [phone]          Patient: Edita Riley   MR Number: [id number]   YOB: 1965   Date of Visit: 6/8/2018       Dear Dr. Arden Gutiérrez:    Thank you for referring Edita Riley to me for evaluation. Attached you will find relevant portions of my assessment and plan of care.    If you have questions, please do not hesitate to call me. I look forward to following Edita Riley along with you.    Sincerely,    Ursula Gauthier MD    Enclosure  CC:  No Recipients    If you would like to receive this communication electronically, please contact [email or web address] or [phone] to request more information on [employer] access.    For providers and/or their staff who would like to refer a patient to [facility], please contact us through our one-stop-shop provider referral line, [facility], at [phone].    If you feel you have received this communication in error or would no longer like to receive these types of communications, please e-mail [email or web address]

## 2018-06-08 NOTE — PROGRESS NOTES
Subjective:       Patient ID: Edita Riley is a 53 y.o. female.    Chief Complaint:  Gynecologic Exam      History of Present Illness  HPI  Edita Riley is a 53 y.o. female  NEW TO ME here for her annual GYN exam.   SHe has a history of hysterectomy for cervical cancer in .  denies vaginal itching or irritation.  Denies vaginal discharge.  She is sexually active. She has had 1 partner for the past 19 years .   History of abnormal pap: Yes - Cervical cancer   Last Pap: approximate date  and was normal  Last MMG: normal--routine follow-up in 12 months  Last Colonoscopy:  colonoscopy 1 month ago without abnormalities.  denies domestic violence. She does feel safe at home.     Past Medical History:   Diagnosis Date    Cervical cancer     Chronic back pain     Fibromyalgia     GERD (gastroesophageal reflux disease)     Hiatal hernia     Hypertension     Lactose intolerance     Osteoarthritis of back     Stroke      Past Surgical History:   Procedure Laterality Date    BILATERAL OOPHORECTOMY      CHOLECYSTECTOMY  2016    Done at [facility], path showed chronic cholecystitis and gallstones    COLONOSCOPY      COLONOSCOPY N/A 10/24/2016    at [facility], Dr Thomas    COLONOSCOPY N/A 2018    Procedure: COLONOSCOPY;  Surgeon: Arden Gutiérrez MD;  Location: [address]);  Service: Endoscopy;  Laterality: N/A;    ESOPHAGOGASTRODUODENOSCOPY      HYSTERECTOMY      [facility] for cervical cancer    OOPHORECTOMY       Social History     Social History    Marital status:      Spouse name: N/A    Number of children: N/A    Years of education: N/A     Occupational History    Not on file.     Social History Main Topics    Smoking status: Never Smoker    Smokeless tobacco: Never Used    Alcohol use No    Drug use: No    Sexual activity: Yes     Partners: Male     Birth control/ protection: None      Comment:  19  "years since      Other Topics Concern    Not on file     Social History Narrative    No narrative on file     Family History   Problem Relation Age of Onset    Heart disease Sister     Heart disease Maternal Grandmother     Colon cancer Father     Esophageal cancer Father     Cancer Father         Lung-smoker    Cancer Mother         Cervical    Cervical cancer Mother     Breast cancer Maternal Aunt     Rectal cancer Neg Hx     Stomach cancer Neg Hx     Crohn's disease Neg Hx     Ulcerative colitis Neg Hx     Diabetes Neg Hx     Hypertension Neg Hx      OB History      Para Term  AB Living    1 1 1 0 0 2    SAB TAB Ectopic Multiple Live Births    0 0 0 1 2          /78   Ht 5' 7" (1.702 m)   Wt 104.2 kg (229 lb 11.5 oz)   LMP 2014 (Approximate)   BMI 35.98 kg/m²         GYN & OB History    Date of Last Pap: 2017    OB History    Para Term  AB Living   1 1 1 0 0 2   SAB TAB Ectopic Multiple Live Births   0 0 0 1 2      # Outcome Date GA Lbr Yeison/2nd Weight Sex Delivery Anes PTL Lv   1A Term      CS-LTranv   PAOLA   1B Term      CS-LTranv   PAOLA          Review of Systems  Review of Systems   Constitutional: Negative for activity change, appetite change, fatigue and unexpected weight change.   HENT: Negative.    Eyes: Negative for visual disturbance.   Respiratory: Negative for shortness of breath and wheezing.    Cardiovascular: Negative for chest pain, palpitations and leg swelling.   Gastrointestinal: Negative for abdominal pain, bloating and blood in stool.   Endocrine: Negative for diabetes, hair loss and hot flashes.   Genitourinary: Negative for decreased libido, dyspareunia and vaginal discharge.   Musculoskeletal: Negative for back pain and joint swelling.   Skin:  Negative for no acne and hair changes.   Neurological: Negative for headaches.   Hematological: Does not bruise/bleed easily.   Psychiatric/Behavioral: Negative for depression and " sleep disturbance. The patient is not nervous/anxious.    Breast: Negative for breast pain and nipple discharge          Objective:    Physical Exam:   Constitutional: She is oriented to person, place, and time. She appears well-developed and well-nourished.    HENT:   Head: Normocephalic and atraumatic.    Eyes: EOM are normal. Pupils are equal, round, and reactive to light.    Neck: Normal range of motion. Neck supple.    Cardiovascular: Normal rate and regular rhythm.     Pulmonary/Chest: Effort normal and breath sounds normal.   BREASTS:  no mass, no tenderness, no deformity and no retraction. Right breast exhibits no inverted nipple, no mass, no nipple discharge, no skin change, no tenderness, no bleeding and no swelling. Left breast exhibits no inverted nipple, no mass, no nipple discharge, no skin change, no tenderness, no bleeding and no swelling. Breasts are symmetrical.              Abdominal: Soft. Bowel sounds are normal.     Genitourinary: Pelvic exam was performed with patient supine.   Genitourinary Comments: PELVIC: Normal external female genitalia without lesions. Normal hair distribution. Adequate perineal body, normal urethral meatus. Vagina atrophic without lesions or discharge. No significant cystocele or rectocele. Bimanual exam shows uterus and cervix to be surgically absent. Adnexa without masses or tenderness.  RECTAL: Deferred             Musculoskeletal: Normal range of motion and moves all extremeties.       Neurological: She is alert and oriented to person, place, and time.    Skin: Skin is warm and dry.    Psychiatric: She has a normal mood and affect.          Assessment:        1. Encounter for gynecological examination without abnormal finding    2. History of cervical cancer    3. Vaginal Pap smear following hysterectomy for malignancy    4. Screening mammogram, encounter for                Plan:      1. Encounter for gynecological examination without abnormal  finding  COUNSELING:  The patient was counseled today on osteoporosis prevention, calcium supplementation, and regular weight bearing exercise. The patient was also counseled today on ACS PAP guidelines, with recommendations for yearly pelvic exams unless their uterus, cervix, and ovaries were removed for benign reasons; in that case, examinations every 3-5 years are recommended. The patient was also counseled regarding monthly breast self-examination, routine STD screening for at-risk populations, prophylactic immunizations for transmitted infections such as HPV, Pertussis, or Influenza as appropriate, and yearly mammograms when indicated by ACS guidelines. She was advised to see her primary care physician for all other health maintenance.   FOLLOW-UP with me for next routine visit.         2. History of cervical cancer    - Liquid-based pap smear, screening  - HPV High Risk Genotypes, PCR    3. Vaginal Pap smear following hysterectomy for malignancy    - Liquid-based pap smear, screening  - HPV High Risk Genotypes, PCR    4. Screening mammogram, encounter for    - Mammo Digital Screening Bilat with Tomosynthesis CAD; Future       Follow-up in about 1 year (around 6/8/2019).

## 2018-06-13 LAB
HPV HR 12 DNA CVX QL NAA+PROBE: NEGATIVE
HPV16 AG SPEC QL: NEGATIVE
HPV18 DNA SPEC QL NAA+PROBE: NEGATIVE

## 2018-06-14 ENCOUNTER — TELEPHONE (OUTPATIENT)
Dept: GASTROENTEROLOGY | Facility: CLINIC | Age: 53
End: 2018-06-14

## 2018-06-14 DIAGNOSIS — R59.9 ENLARGED LYMPH NODES: ICD-10-CM

## 2018-06-14 NOTE — PROGRESS NOTES
Please notify patient, we will repeat CT scan next month (please schedule) to look at the lymph nodes.

## 2018-06-14 NOTE — TELEPHONE ENCOUNTER
----- Message from Arden Gutiérrez MD sent at 6/14/2018  7:26 AM CDT -----  Please notify patient, we will repeat CT scan next month (please schedule) to look at the lymph nodes.

## 2018-06-20 RX ORDER — LISINOPRIL 10 MG/1
10 TABLET ORAL DAILY
Qty: 90 TABLET | Refills: 3 | Status: SHIPPED | OUTPATIENT
Start: 2018-06-20 | End: 2019-04-15 | Stop reason: SDUPTHER

## 2018-06-20 RX ORDER — DULOXETIN HYDROCHLORIDE 60 MG/1
60 CAPSULE, DELAYED RELEASE ORAL DAILY
Qty: 90 CAPSULE | Refills: 3 | Status: SHIPPED | OUTPATIENT
Start: 2018-06-20 | End: 2019-04-15 | Stop reason: SDUPTHER

## 2018-07-19 ENCOUNTER — TELEPHONE (OUTPATIENT)
Dept: SPINE | Facility: CLINIC | Age: 53
End: 2018-07-19

## 2018-07-19 DIAGNOSIS — M54.5 LOW BACK PAIN, UNSPECIFIED BACK PAIN LATERALITY, UNSPECIFIED CHRONICITY, WITH SCIATICA PRESENCE UNSPECIFIED: Primary | ICD-10-CM

## 2018-07-20 ENCOUNTER — HOSPITAL ENCOUNTER (OUTPATIENT)
Dept: RADIOLOGY | Facility: OTHER | Age: 53
Discharge: HOME | End: 2018-07-20
Attending: PHYSICIAN ASSISTANT
Payer: MEDICAID

## 2018-07-20 ENCOUNTER — OFFICE VISIT (OUTPATIENT)
Dept: GASTROENTEROLOGY | Facility: CLINIC | Age: 53
End: 2018-07-20
Payer: MEDICAID

## 2018-07-20 ENCOUNTER — OFFICE VISIT (OUTPATIENT)
Dept: SPINE | Facility: CLINIC | Age: 53
End: 2018-07-20
Payer: MEDICAID

## 2018-07-20 ENCOUNTER — TELEPHONE (OUTPATIENT)
Dept: GASTROENTEROLOGY | Facility: CLINIC | Age: 53
End: 2018-07-20

## 2018-07-20 VITALS
BODY MASS INDEX: 36.88 KG/M2 | WEIGHT: 235 LBS | HEIGHT: 67 IN | SYSTOLIC BLOOD PRESSURE: 132 MMHG | DIASTOLIC BLOOD PRESSURE: 83 MMHG | HEART RATE: 92 BPM

## 2018-07-20 VITALS
SYSTOLIC BLOOD PRESSURE: 146 MMHG | HEIGHT: 67 IN | BODY MASS INDEX: 35.94 KG/M2 | DIASTOLIC BLOOD PRESSURE: 79 MMHG | HEART RATE: 83 BPM | WEIGHT: 229 LBS

## 2018-07-20 DIAGNOSIS — M54.5 LOW BACK PAIN, UNSPECIFIED BACK PAIN LATERALITY, UNSPECIFIED CHRONICITY, WITH SCIATICA PRESENCE UNSPECIFIED: ICD-10-CM

## 2018-07-20 DIAGNOSIS — M54.41 CHRONIC BILATERAL LOW BACK PAIN WITH BILATERAL SCIATICA: ICD-10-CM

## 2018-07-20 DIAGNOSIS — R59.0 RETROPERITONEAL LYMPHADENOPATHY: Primary | ICD-10-CM

## 2018-07-20 DIAGNOSIS — M54.17 THORACIC AND LUMBOSACRAL NEURITIS: ICD-10-CM

## 2018-07-20 DIAGNOSIS — M54.14 THORACIC AND LUMBOSACRAL NEURITIS: ICD-10-CM

## 2018-07-20 DIAGNOSIS — M54.42 CHRONIC BILATERAL LOW BACK PAIN WITH BILATERAL SCIATICA: ICD-10-CM

## 2018-07-20 DIAGNOSIS — G89.29 CHRONIC BILATERAL LOW BACK PAIN WITH BILATERAL SCIATICA: ICD-10-CM

## 2018-07-20 PROCEDURE — 99214 OFFICE O/P EST MOD 30 MIN: CPT | Mod: PBBFAC,25 | Performed by: PHYSICIAN ASSISTANT

## 2018-07-20 PROCEDURE — 99999 PR PBB SHADOW E&M-EST. PATIENT-LVL IV: CPT | Mod: PBBFAC,,, | Performed by: INTERNAL MEDICINE

## 2018-07-20 PROCEDURE — 72120 X-RAY BEND ONLY L-S SPINE: CPT | Mod: TC,FY

## 2018-07-20 PROCEDURE — 72120 X-RAY BEND ONLY L-S SPINE: CPT | Mod: 26,,, | Performed by: RADIOLOGY

## 2018-07-20 PROCEDURE — 72100 X-RAY EXAM L-S SPINE 2/3 VWS: CPT | Mod: 26,,, | Performed by: RADIOLOGY

## 2018-07-20 PROCEDURE — 99214 OFFICE O/P EST MOD 30 MIN: CPT | Mod: PBBFAC,25,27 | Performed by: INTERNAL MEDICINE

## 2018-07-20 PROCEDURE — 99204 OFFICE O/P NEW MOD 45 MIN: CPT | Mod: S$PBB,,, | Performed by: PHYSICIAN ASSISTANT

## 2018-07-20 PROCEDURE — 99213 OFFICE O/P EST LOW 20 MIN: CPT | Mod: S$PBB,,, | Performed by: INTERNAL MEDICINE

## 2018-07-20 PROCEDURE — 99999 PR PBB SHADOW E&M-EST. PATIENT-LVL IV: CPT | Mod: PBBFAC,,, | Performed by: PHYSICIAN ASSISTANT

## 2018-07-20 NOTE — TELEPHONE ENCOUNTER
----- Message from Annie Chavez sent at 7/20/2018 12:17 PM CDT -----  Good afternoon   Patient was seen in our clinic today.  She stated that your office has her MRI disk and report.  Is there anyway that you can get that sent over to us so we can review that?  Please let me know.  Thank you     Constanza
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
## 2018-07-20 NOTE — PROGRESS NOTES
REASON FOR VISIT:  Back pain and generalized abdominal pain.    HISTORY OF PRESENT ILLNESS:  Ms. Riley was last seen in clinic on   10/06/2017, been complaining about some generalized abdominal pain, back pain   and diffuse muscle pain.  She is also having intermittent dysphagia.  She   subsequently underwent an MRI scan, which was rather unremarkable with some disc   bulge, but a retroperitoneal lymph node was found and subsequent CT imaging of   the abdomen and pelvis with contrast revealed a 2 cm soft tissue nodule adjacent   to the right common iliac bifurcation suspicious for enlarged lymph node.    Today, we discussed about repeating the CT abdomen to see if this was mostly of   reactive etiology and if the size has increased or is still present.  We will   consider sampling of the lymph node with referral to General Surgery.    Otherwise, the patient continues to have low back pain and we will defer to   Spine Services for further evaluation regarding the disc bulge.  The patient   will try to obtain the MRI disc for further evaluation.    PAST MEDICAL, SURGICAL, SOCIAL AND FAMILY HISTORY:  Otherwise reviewed.    MEDICATIONS AND ALLERGIES:  Reviewed.    REVIEW OF SYSTEMS:  CONSTITUTIONAL:  No fever, no chills.  Weight gain.  Appetite is normal.  CARDIOVASCULAR:  No angina or palpitation.  RESPIRATORY:  No shortness of breath or wheezing.  GASTROINTESTINAL:  See HPI.  No blood in the stool.  Denies any dysphagia.    PHYSICAL EXAMINATION:  VITAL SIGNS:  See EPIC.  GENERAL:  Awake, alert and oriented x3, no acute distress.  No physical exam done today.  About 15 minutes spent with the patient with more   than 50% in counseling.    IMPRESSION:  1.  Low back pain.  2.  Incidental finding of retroperitoneal lymphadenopathy.    RECOMMENDATION:  1.  Repeat CT of the abdomen and pelvis with contrast.  2.  Spine Center evaluation for back pain.  3.  Further recommendation based on the repeat CT.      ACT/HN  dd:  07/20/2018 13:21:35 (CDT)  td: 07/20/2018 15:26:56 (ALMAZT)  Doc ID   #[id number]  Job ID #[id number]    CC:

## 2018-07-20 NOTE — LETTER
July 20, 2018      Hammad Lackey MD  [address]  [place] [address]           [facility] - Gastroenterology  [address]  [place] [address]  Phone: [phone]  Fax: [phone]          Patient: Edita Riley   MR Number: [id number]   YOB: 1965   Date of Visit: 7/20/2018       Dear Dr. Hammad Lackey:    Thank you for referring Edita Riley to me for evaluation. Attached you will find relevant portions of my assessment and plan of care.    If you have questions, please do not hesitate to call me. I look forward to following Edita Riley along with you.    Sincerely,    Arden Gutiérrez MD    Enclosure  CC:  No Recipients    If you would like to receive this communication electronically, please contact [email or web address] or [phone] to request more information on [employer] Link access.    For providers and/or their staff who would like to refer a patient to [facility], please contact us through our one-stop-shop provider referral line, [facility], at [phone].    If you feel you have received this communication in error or would no longer like to receive these types of communications, please e-mail [email or web address]

## 2018-07-24 ENCOUNTER — HOSPITAL ENCOUNTER (OUTPATIENT)
Dept: RADIOLOGY | Facility: HOSPITAL | Age: 53
Discharge: HOME | End: 2018-07-24
Attending: INTERNAL MEDICINE
Payer: MEDICAID

## 2018-07-24 ENCOUNTER — TELEPHONE (OUTPATIENT)
Dept: GASTROENTEROLOGY | Facility: CLINIC | Age: 53
End: 2018-07-24

## 2018-07-24 DIAGNOSIS — R59.9 ENLARGED LYMPH NODES: ICD-10-CM

## 2018-07-24 LAB
CREAT SERPL-MCNC: 0.8 MG/DL (ref 0.5–1.4)
SAMPLE: NORMAL

## 2018-07-24 PROCEDURE — 74177 CT ABD & PELVIS W/CONTRAST: CPT | Mod: 26,,, | Performed by: RADIOLOGY

## 2018-07-24 PROCEDURE — 74177 CT ABD & PELVIS W/CONTRAST: CPT | Mod: TC

## 2018-07-24 PROCEDURE — 25500020 PHARM REV CODE 255: Performed by: INTERNAL MEDICINE

## 2018-07-24 RX ADMIN — IOHEXOL 100 ML: 350 INJECTION, SOLUTION INTRAVENOUS at 01:07

## 2018-07-24 RX ADMIN — IOHEXOL 15 ML: 350 INJECTION, SOLUTION INTRAVENOUS at 01:07

## 2018-07-24 NOTE — TELEPHONE ENCOUNTER
----- Message from Arden Gutiérrez MD sent at 7/24/2018  2:26 PM CDT -----  Please notify patient the abdominal lymph nodes are stable. Please schedule Gen Surgery consult with  for enlarged abdominal lymph nodes.

## 2018-07-26 ENCOUNTER — LAB VISIT (OUTPATIENT)
Dept: LAB | Facility: HOSPITAL | Age: 53
End: 2018-07-26
Payer: MEDICAID

## 2018-07-26 ENCOUNTER — OFFICE VISIT (OUTPATIENT)
Dept: INTERNAL MEDICINE | Facility: CLINIC | Age: 53
End: 2018-07-26
Payer: MEDICAID

## 2018-07-26 VITALS
HEIGHT: 67 IN | BODY MASS INDEX: 37.16 KG/M2 | HEART RATE: 95 BPM | SYSTOLIC BLOOD PRESSURE: 131 MMHG | WEIGHT: 236.75 LBS | DIASTOLIC BLOOD PRESSURE: 70 MMHG

## 2018-07-26 DIAGNOSIS — R10.9 CHRONIC ABDOMINAL PAIN: ICD-10-CM

## 2018-07-26 DIAGNOSIS — I10 ESSENTIAL HYPERTENSION: ICD-10-CM

## 2018-07-26 DIAGNOSIS — K21.00 GASTROESOPHAGEAL REFLUX DISEASE WITH ESOPHAGITIS: ICD-10-CM

## 2018-07-26 DIAGNOSIS — E55.9 VITAMIN D DEFICIENCY: ICD-10-CM

## 2018-07-26 DIAGNOSIS — M79.7 FIBROMYALGIA: ICD-10-CM

## 2018-07-26 DIAGNOSIS — G89.29 CHRONIC BACK PAIN, UNSPECIFIED BACK LOCATION, UNSPECIFIED BACK PAIN LATERALITY: Primary | ICD-10-CM

## 2018-07-26 DIAGNOSIS — M54.9 CHRONIC BACK PAIN, UNSPECIFIED BACK LOCATION, UNSPECIFIED BACK PAIN LATERALITY: Primary | ICD-10-CM

## 2018-07-26 DIAGNOSIS — G89.29 CHRONIC ABDOMINAL PAIN: ICD-10-CM

## 2018-07-26 LAB — 25(OH)D3+25(OH)D2 SERPL-MCNC: 18 NG/ML

## 2018-07-26 PROCEDURE — 99213 OFFICE O/P EST LOW 20 MIN: CPT | Mod: S$PBB,,, | Performed by: STUDENT IN AN ORGANIZED HEALTH CARE EDUCATION/TRAINING PROGRAM

## 2018-07-26 PROCEDURE — 99999 PR PBB SHADOW E&M-EST. PATIENT-LVL IV: CPT | Mod: PBBFAC,,, | Performed by: STUDENT IN AN ORGANIZED HEALTH CARE EDUCATION/TRAINING PROGRAM

## 2018-07-26 PROCEDURE — 36415 COLL VENOUS BLD VENIPUNCTURE: CPT

## 2018-07-26 PROCEDURE — 99214 OFFICE O/P EST MOD 30 MIN: CPT | Mod: PBBFAC | Performed by: STUDENT IN AN ORGANIZED HEALTH CARE EDUCATION/TRAINING PROGRAM

## 2018-07-26 PROCEDURE — 82306 VITAMIN D 25 HYDROXY: CPT

## 2018-07-26 RX ORDER — GABAPENTIN 300 MG/1
600 CAPSULE ORAL 2 TIMES DAILY
Qty: 180 CAPSULE | Refills: 1 | Status: SHIPPED | OUTPATIENT
Start: 2018-07-26 | End: 2018-07-26 | Stop reason: SDUPTHER

## 2018-07-26 RX ORDER — GABAPENTIN 300 MG/1
600 CAPSULE ORAL 2 TIMES DAILY
Qty: 180 CAPSULE | Refills: 1 | Status: SHIPPED | OUTPATIENT
Start: 2018-07-26 | End: 2018-11-11 | Stop reason: SDUPTHER
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
## 2018-08-08 ENCOUNTER — HOSPITAL ENCOUNTER (OUTPATIENT)
Dept: RADIOLOGY | Facility: HOSPITAL | Age: 53
Discharge: HOME | End: 2018-08-08
Attending: OBSTETRICS & GYNECOLOGY
Payer: MEDICAID

## 2018-08-08 DIAGNOSIS — Z12.31 SCREENING MAMMOGRAM, ENCOUNTER FOR: ICD-10-CM

## 2018-08-08 PROCEDURE — 77063 BREAST TOMOSYNTHESIS BI: CPT | Mod: 26,,, | Performed by: RADIOLOGY

## 2018-08-08 PROCEDURE — 77067 SCR MAMMO BI INCL CAD: CPT | Mod: 26,,, | Performed by: RADIOLOGY

## 2018-08-08 PROCEDURE — 77067 SCR MAMMO BI INCL CAD: CPT | Mod: TC

## 2018-08-09 ENCOUNTER — TELEPHONE (OUTPATIENT)
Dept: HEPATOLOGY | Facility: HOSPITAL | Age: 53
End: 2018-08-09

## 2018-08-09 NOTE — TELEPHONE ENCOUNTER
Called patient to inform her that her vit D level was low, 18 from lab at last appointment. She is currently on Vit D3 (cholecalciferol) 2,000 units daily. I instructed her to take this supplement twice daily, instead of just once. I will repeat this vit D level when she comes back to her next visit in 3 months.     6:43 PM   8/9/2018

## 2018-08-23 ENCOUNTER — OFFICE VISIT (OUTPATIENT)
Dept: SURGERY | Facility: CLINIC | Age: 53
End: 2018-08-23
Payer: MEDICAID

## 2018-08-23 VITALS
DIASTOLIC BLOOD PRESSURE: 81 MMHG | WEIGHT: 237 LBS | BODY MASS INDEX: 37.2 KG/M2 | HEIGHT: 67 IN | TEMPERATURE: 98 F | SYSTOLIC BLOOD PRESSURE: 136 MMHG | HEART RATE: 85 BPM

## 2018-08-23 DIAGNOSIS — R59.9 ENLARGED LYMPH NODES: Primary | ICD-10-CM

## 2018-08-23 DIAGNOSIS — R59.1 LYMPHADENOPATHY: Primary | ICD-10-CM

## 2018-08-23 PROCEDURE — 99214 OFFICE O/P EST MOD 30 MIN: CPT | Mod: S$PBB,,, | Performed by: SURGERY

## 2018-08-23 PROCEDURE — 99999 PR PBB SHADOW E&M-EST. PATIENT-LVL III: CPT | Mod: PBBFAC,,, | Performed by: SURGERY

## 2018-08-23 PROCEDURE — 99213 OFFICE O/P EST LOW 20 MIN: CPT | Mod: PBBFAC | Performed by: SURGERY

## 2018-08-23 NOTE — PROGRESS NOTES
History & Physical    SUBJECTIVE:     History of Present Illness:  Patient is a 53 y.o. female presents with chronic abdominal pain referred for consideration of biopsy of a 2 cm iliac artery bifurcation node  Node was evident on scan in 4/18 and again in 7/18 - no change  Patient's symptoms are non specific, diffuse, have not resulted in weight loss  Patient already carries a diagnosis of diffuse pain syndromes including fibromyalgia       Chief Complaint   Patient presents with    Consult       Review of patient's allergies indicates:   Allergen Reactions    Bee sting [allergen ext-venom-honey bee]      Rash      Grass pollen-bermuda, standard      rash       Current Outpatient Medications   Medication Sig Dispense Refill    arm brace (NEOPRENE WRIST SPLINT SUPPORT) Misc 1 Units by Misc.(Non-Drug; Combo Route) route every evening. 1 each 0    cholecalciferol, vitamin D3, 2,000 unit Cap Take 1 capsule by mouth once daily.      conjugated estrogens (PREMARIN) vaginal cream Place 0.5 g vaginally twice a week. 45 g 1    cyclobenzaprine (FLEXERIL) 10 MG tablet TAKE 1 TABLET (10 MG TOTAL) BY MOUTH NIGHTLY AS NEEDED FOR MUSCLE SPASMS.  1    DULoxetine (CYMBALTA) 60 MG capsule TAKE 1 CAPSULE (60 MG TOTAL) BY MOUTH ONCE DAILY. 90 capsule 3    estradiol (ESTRACE) 1 MG tablet Take 1 mg by mouth once daily.  11    gabapentin (NEURONTIN) 300 MG capsule Take 2 capsules (600 mg total) by mouth 2 (two) times daily. 180 capsule 1    lisinopril 10 MG tablet TAKE 1 TABLET (10 MG TOTAL) BY MOUTH ONCE DAILY. 90 tablet 3    meclizine (ANTIVERT) 25 mg tablet Take 1 tablet (25 mg total) by mouth 3 (three) times daily as needed for Dizziness. 30 tablet 6    omeprazole (PRILOSEC) 40 MG capsule Take 1 capsule (40 mg total) by mouth once daily. 90 capsule 3    sucralfate (CARAFATE) 1 gram tablet Take 1 tablet (1 g total) by mouth 2 (two) times daily. 180 tablet 3    traMADol (ULTRAM) 50 mg tablet Take 50 mg by mouth every 6  (six) hours as needed for Pain.      traZODone (DESYREL) 50 MG tablet Take 1 tablet (50 mg total) by mouth every evening. 90 tablet 3     No current facility-administered medications for this visit.        Past Medical History:   Diagnosis Date    Cervical cancer 2014    Chronic back pain     Fibromyalgia     GERD (gastroesophageal reflux disease)     Hiatal hernia 2014    Hypertension     Lactose intolerance     Osteoarthritis of back     Stroke 1972     Past Surgical History:   Procedure Laterality Date    BILATERAL OOPHORECTOMY  2015    CHOLECYSTECTOMY  11/09/2016    Done at [facility], [facility] showed chronic cholecystitis and gallstones    cold knife conization  2014    COLONOSCOPY  2014    ESOPHAGOGASTRODUODENOSCOPY  2014    HYSTERECTOMY  2014    [facility] for cervical cancer    OOPHORECTOMY       Family History   Problem Relation Age of Onset    Heart disease Sister     Heart disease Maternal Grandmother     Colon cancer Father     Esophageal cancer Father     Cancer Father         Lung-smoker    Cancer Mother         Cervical    Cervical cancer Mother     Breast cancer Maternal Aunt     Rectal cancer Neg Hx     Stomach cancer Neg Hx     Crohn's disease Neg Hx     Ulcerative colitis Neg Hx     Diabetes Neg Hx     Hypertension Neg Hx      Social History     Tobacco Use    Smoking status: Never Smoker    Smokeless tobacco: Never Used   Substance Use Topics    Alcohol use: No     Alcohol/week: 0.0 oz    Drug use: No        Review of Systems:  Review of Systems   Constitutional: Positive for activity change, appetite change and fatigue. Negative for unexpected weight change.   Respiratory: Negative.    Cardiovascular: Negative.    Gastrointestinal: Positive for abdominal pain. Negative for abdominal distention and anal bleeding.   Endocrine: Negative.    Neurological: Negative.    Hematological: Positive for adenopathy. Does not bruise/bleed easily.       OBJECTIVE:     Vital Signs (Most  "Recent)  Temp: 98 °F (36.7 °C) (08/23/18 0950)  Pulse: 85 (08/23/18 0950)  BP: 136/81 (08/23/18 0950)  5' 7" (1.702 m)  107.5 kg (236 lb 15.9 oz)     Physical Exam:  Physical Exam   Constitutional: She is oriented to person, place, and time. She appears well-developed and well-nourished. She appears distressed.   HENT:   Head: Normocephalic and atraumatic.   Neck: Normal range of motion. Neck supple. No JVD present. No tracheal deviation present. No thyromegaly present.   Cardiovascular: Normal rate and regular rhythm.   Pulmonary/Chest: Effort normal and breath sounds normal.   Abdominal: Soft. Bowel sounds are normal. She exhibits no distension. There is no tenderness. There is no guarding.   Musculoskeletal: Normal range of motion.   Lymphadenopathy:     She has no cervical adenopathy.   Neurological: She is alert and oriented to person, place, and time.   Skin: She is not diaphoretic.           ASSESSMENT/PLAN:     Iliac adenopathy  Stable on imaging surveillance    PLAN:Plan     PET scan  If negative would not pursue further  If positive will consider biopsy - Given location neither needle or minimally invasive approach would be possible       "

## 2018-08-31 ENCOUNTER — HOSPITAL ENCOUNTER (OUTPATIENT)
Dept: RADIOLOGY | Facility: HOSPITAL | Age: 53
Discharge: HOME | End: 2018-08-31
Attending: SURGERY
Payer: MEDICAID

## 2018-08-31 ENCOUNTER — TELEPHONE (OUTPATIENT)
Dept: SURGERY | Facility: CLINIC | Age: 53
End: 2018-08-31

## 2018-08-31 DIAGNOSIS — R59.9 ENLARGED LYMPH NODES: ICD-10-CM

## 2018-08-31 LAB — POCT GLUCOSE: 128 MG/DL (ref 70–110)

## 2018-08-31 PROCEDURE — 78815 PET IMAGE W/CT SKULL-THIGH: CPT | Mod: TC

## 2018-08-31 PROCEDURE — A9552 F18 FDG: HCPCS

## 2018-08-31 PROCEDURE — 78815 PET IMAGE W/CT SKULL-THIGH: CPT | Mod: 26,PI,, | Performed by: RADIOLOGY

## 2018-08-31 NOTE — TELEPHONE ENCOUNTER
Call to pt per Dr Reed to have pt come in for f/u appt to discuss PET results and schedule lymph node bx. Pt scheduled 9/6/18 at 8:45 am.

## 2018-09-06 ENCOUNTER — OFFICE VISIT (OUTPATIENT)
Dept: SURGERY | Facility: CLINIC | Age: 53
End: 2018-09-06
Payer: MEDICAID

## 2018-09-06 VITALS
HEIGHT: 67 IN | DIASTOLIC BLOOD PRESSURE: 79 MMHG | WEIGHT: 238.13 LBS | HEART RATE: 88 BPM | TEMPERATURE: 98 F | SYSTOLIC BLOOD PRESSURE: 158 MMHG | BODY MASS INDEX: 37.37 KG/M2

## 2018-09-06 DIAGNOSIS — R59.0 RETROPERITONEAL LYMPHADENOPATHY: Primary | ICD-10-CM

## 2018-09-06 DIAGNOSIS — R59.1 LYMPHADENOPATHY: Primary | ICD-10-CM

## 2018-09-06 PROCEDURE — 99213 OFFICE O/P EST LOW 20 MIN: CPT | Mod: PBBFAC | Performed by: SURGERY

## 2018-09-06 PROCEDURE — 99213 OFFICE O/P EST LOW 20 MIN: CPT | Mod: S$PBB,,, | Performed by: SURGERY

## 2018-09-06 PROCEDURE — 99999 PR PBB SHADOW E&M-EST. PATIENT-LVL III: CPT | Mod: PBBFAC,,, | Performed by: SURGERY

## 2018-09-06 NOTE — PROGRESS NOTES
History of Present Illness:  Patient is a 53 y.o. female presents with chronic abdominal pain referred for consideration of biopsy of a 2 cm iliac artery bifurcation node  Node was evident on scan in 4/18 and again in 7/18 - no change  Patient's symptoms are non specific, diffuse, have not resulted in weight loss  Patient already carries a diagnosis of diffuse pain syndromes including fibromyalgia            Chief Complaint   Patient presents with    Consult               Review of patient's allergies indicates:   Allergen Reactions    Bee sting [allergen ext-venom-honey bee]         Rash       Grass pollen-bermuda, standard         rash         Current Medications          Current Outpatient Medications   Medication Sig Dispense Refill    arm brace (NEOPRENE WRIST SPLINT SUPPORT) Misc 1 Units by Misc.(Non-Drug; Combo Route) route every evening. 1 each 0    cholecalciferol, vitamin D3, 2,000 unit Cap Take 1 capsule by mouth once daily.        conjugated estrogens (PREMARIN) vaginal cream Place 0.5 g vaginally twice a week. 45 g 1    cyclobenzaprine (FLEXERIL) 10 MG tablet TAKE 1 TABLET (10 MG TOTAL) BY MOUTH NIGHTLY AS NEEDED FOR MUSCLE SPASMS.   1    DULoxetine (CYMBALTA) 60 MG capsule TAKE 1 CAPSULE (60 MG TOTAL) BY MOUTH ONCE DAILY. 90 capsule 3    estradiol (ESTRACE) 1 MG tablet Take 1 mg by mouth once daily.   11    gabapentin (NEURONTIN) 300 MG capsule Take 2 capsules (600 mg total) by mouth 2 (two) times daily. 180 capsule 1    lisinopril 10 MG tablet TAKE 1 TABLET (10 MG TOTAL) BY MOUTH ONCE DAILY. 90 tablet 3    meclizine (ANTIVERT) 25 mg tablet Take 1 tablet (25 mg total) by mouth 3 (three) times daily as needed for Dizziness. 30 tablet 6    omeprazole (PRILOSEC) 40 MG capsule Take 1 capsule (40 mg total) by mouth once daily. 90 capsule 3    sucralfate (CARAFATE) 1 gram tablet Take 1 tablet (1 g total) by mouth 2 (two) times daily. 180 tablet 3    traMADol (ULTRAM) 50 mg tablet Take 50 mg by  mouth every 6 (six) hours as needed for Pain.        traZODone (DESYREL) 50 MG tablet Take 1 tablet (50 mg total) by mouth every evening. 90 tablet 3      No current facility-administered medications for this visit.                  Past Medical History:   Diagnosis Date    Cervical cancer 2014    Chronic back pain      Fibromyalgia      GERD (gastroesophageal reflux disease)      Hiatal hernia 2014    Hypertension      Lactose intolerance      Osteoarthritis of back      Stroke 1972            Past Surgical History:   Procedure Laterality Date    BILATERAL OOPHORECTOMY   2015    CHOLECYSTECTOMY   11/09/2016     Done at [facility], path showed chronic cholecystitis and gallstones    cold knife conization   2014    COLONOSCOPY   2014    ESOPHAGOGASTRODUODENOSCOPY   2014    HYSTERECTOMY   2014     [facility] for cervical cancer    OOPHORECTOMY          Family History   Problem Relation Age of Onset    Heart disease Sister      Heart disease Maternal Grandmother      Colon cancer Father      Esophageal cancer Father      Cancer Father           Lung-smoker    Cancer Mother           Cervical    Cervical cancer Mother      Breast cancer Maternal Aunt      Rectal cancer Neg Hx      Stomach cancer Neg Hx      Crohn's disease Neg Hx      Ulcerative colitis Neg Hx      Diabetes Neg Hx      Hypertension Neg Hx        Social History            Tobacco Use    Smoking status: Never Smoker    Smokeless tobacco: Never Used   Substance Use Topics    Alcohol use: No       Alcohol/week: 0.0 oz    Drug use: No         Review of Systems:  Review of Systems   Constitutional: Positive for activity change, appetite change and fatigue. Negative for unexpected weight change.   Respiratory: Negative.    Cardiovascular: Negative.    Gastrointestinal: Positive for abdominal pain. Negative for abdominal distention and anal bleeding.   Endocrine: Negative.    Neurological: Negative.    Hematological: Positive for  "adenopathy. Does not bruise/bleed easily.         OBJECTIVE:      Vital Signs (Most Recent)  Temp: 98 °F (36.7 °C) (08/23/18 0950)  Pulse: 85 (08/23/18 0950)  BP: 136/81 (08/23/18 0950)  5' 7" (1.702 m)  107.5 kg (236 lb 15.9 oz)      Physical Exam:  Physical Exam   Constitutional: She is oriented to person, place, and time. She appears well-developed and well-nourished. She appears distressed.   HENT:   Head: Normocephalic and atraumatic.   Neck: Normal range of motion. Neck supple. No JVD present. No tracheal deviation present. No thyromegaly present.   Cardiovascular: Normal rate and regular rhythm.   Pulmonary/Chest: Effort normal and breath sounds normal.   Abdominal: Soft. Bowel sounds are normal. She exhibits no distension. There is no tenderness. There is no guarding.   Musculoskeletal: Normal range of motion.   Lymphadenopathy:     She has no cervical adenopathy.   Neurological: She is alert and oriented to person, place, and time.   Skin: She is not diaphoretic.               ASSESSMENT/PLAN:      Iliac adenopathy  Stable on imaging surveillance  A PET revealed that the node while stable has an SUV of 10    PLAN Given the node's location (at the iliac bifurcation)  Open biopsy will be required  Planned via a right retroperitoneal approach Monday Sept 17 "

## 2018-09-12 DIAGNOSIS — R59.1 LYMPHADENOPATHY: ICD-10-CM

## 2018-09-12 RX ORDER — SODIUM CHLORIDE 9 MG/ML
INJECTION, SOLUTION INTRAVENOUS CONTINUOUS
Status: CANCELLED | OUTPATIENT
Start: 2018-09-12

## 2018-09-15 ENCOUNTER — ANESTHESIA EVENT (OUTPATIENT)
Dept: SURGERY | Facility: HOSPITAL | Age: 53
DRG: 825 | End: 2018-09-15
Payer: MEDICAID

## 2018-09-15 NOTE — ANESTHESIA PREPROCEDURE EVALUATION
[facility]  Anesthesia Pre-Operative Evaluation         Patient Name: Edita Riley  YOB: 1965  MRN: [id number]    SUBJECTIVE:     Pre-operative evaluation for Procedure(s) (LRB):  LYMPHADENECTOMY, RETROPERITONEUM (Right)     09/15/2018    Edita Riley is a 53 y.o. female w/ a significant PMHx of HTN, GERD 2/2 hiatal hernia (well controlled), h/o CVA with residual LSW and facial droop, diffuse pain syndromes including fibromyalgia who is followed in general surgery clinic for chronic abdominal pain, referred for consideration of biopsy of a 2 cm iliac artery bifurcation node. Node was evident on scan in 4/18 and again in 7/18 - no change noted. Patient's symptoms are non specific, diffuse, have not resulted in weight loss.     Patient now presents for the above procedure(s).  Planned via a right retroperitoneal approach.      LDA: None documented.    Prev airway: Easy mask ventilation with oral airway. Grade II view on DL, no complications listed.    Drips: None documented.    Patient Active Problem List   Diagnosis    Osteoarthritis of back    Hiatal hernia    Essential hypertension    Lower extremity pain, diffuse    Weakness of both lower extremities    Impaired functional mobility, balance, gait, and endurance    Schatzki's ring    Colon polyp    Internal hemorrhoids    Cardiovascular event risk -- low    Hemifacial spasm    Depression    Fibromyalgia    Facial palsy    Blood in stool    Sleep stage dysfunction    Carpal tunnel syndrome on right    Weakness    Difficulty walking    Gastroesophageal reflux disease with esophagitis    Abnormal CT of the abdomen       Review of patient's allergies indicates:   Allergen Reactions    Bee sting [allergen ext-venom-honey bee]      Rash      Grass pollen-bermuda, standard      rash       Current Outpatient Medications:  No current facility-administered medications for this encounter.      Current Outpatient Medications:     arm brace (NEOPRENE WRIST SPLINT SUPPORT) Misc, 1 Units by Misc.(Non-Drug; Combo Route) route every evening., Disp: 1 each, Rfl: 0    cholecalciferol, vitamin D3, 2,000 unit Cap, Take 1 capsule by mouth once daily., Disp: , Rfl:     conjugated estrogens (PREMARIN) vaginal cream, Place 0.5 g vaginally twice a week., Disp: 45 g, Rfl: 1    cyclobenzaprine (FLEXERIL) 10 MG tablet, TAKE 1 TABLET (10 MG TOTAL) BY MOUTH NIGHTLY AS NEEDED FOR MUSCLE SPASMS., Disp: , Rfl: 1    DULoxetine (CYMBALTA) 60 MG capsule, TAKE 1 CAPSULE (60 MG TOTAL) BY MOUTH ONCE DAILY., Disp: 90 capsule, Rfl: 3    estradiol (ESTRACE) 1 MG tablet, Take 1 mg by mouth once daily., Disp: , Rfl: 11    gabapentin (NEURONTIN) 300 MG capsule, Take 2 capsules (600 mg total) by mouth 2 (two) times daily., Disp: 180 capsule, Rfl: 1    lisinopril 10 MG tablet, TAKE 1 TABLET (10 MG TOTAL) BY MOUTH ONCE DAILY., Disp: 90 tablet, Rfl: 3    meclizine (ANTIVERT) 25 mg tablet, Take 1 tablet (25 mg total) by mouth 3 (three) times daily as needed for Dizziness., Disp: 30 tablet, Rfl: 6    omeprazole (PRILOSEC) 40 MG capsule, Take 1 capsule (40 mg total) by mouth once daily., Disp: 90 capsule, Rfl: 3    sucralfate (CARAFATE) 1 gram tablet, Take 1 tablet (1 g total) by mouth 2 (two) times daily., Disp: 180 tablet, Rfl: 3    traMADol (ULTRAM) 50 mg tablet, Take 50 mg by mouth every 6 (six) hours as needed for Pain., Disp: , Rfl:     traZODone (DESYREL) 50 MG tablet, Take 1 tablet (50 mg total) by mouth every evening., Disp: 90 tablet, Rfl: 3    Past Surgical History:   Procedure Laterality Date    BILATERAL OOPHORECTOMY  2015    CHOLECYSTECTOMY  11/09/2016    Done at [facility], path showed chronic cholecystitis and gallstones    CHOLECYSTECTOMY-LAPAROSCOPIC N/A 11/9/2016    Performed by [facility] Goldberg[facility] at [facility] OR 2ND FLR    cold knife conization  2014    COLONOSCOPY  2014    COLONOSCOPY N/A 10/24/2016    at  [facility], Dr Thomas    COLONOSCOPY N/A 5/18/2018    Procedure: COLONOSCOPY;  Surgeon: Arden Gutiérrez MD;  Location: [facility] (4TH FLR);  Service: Endoscopy;  Laterality: N/A;    COLONOSCOPY N/A 5/18/2018    Performed by [facility]den [facility]rez [facility] at [facility] ENDO (4TH FLR)    COLONOSCOPY N/A 10/24/2016    Performed by Arden Gutiérrez MD at [facility] (4TH FLR)    ESOPHAGOGASTRODUODENOSCOPY  2014    ESOPHAGOGASTRODUODENOSCOPY (EGD) N/A 10/24/2016    Performed by Arden Gutiérrez MD at [facility] (4TH FLR)    HYSTERECTOMY  2014    [facility] for cervical cancer    OOPHORECTOMY         Social History     Socioeconomic History    Marital status:      Spouse name: Not on file    Number of children: Not on file    Years of education: Not on file    Highest education level: Not on file   Social Needs    Financial resource strain: Not on file    Food insecurity - worry: Not on file    Food insecurity - inability: Not on file    Transportation needs - medical: Not on file    Transportation needs - non-medical: Not on file   Occupational History    Not on file   Tobacco Use    Smoking status: Never Smoker    Smokeless tobacco: Never Used   Substance and Sexual Activity    Alcohol use: No     Alcohol/week: 0.0 oz    Drug use: No    Sexual activity: Yes     Partners: Male     Birth control/protection: None     Comment:  19 years since 1999   Other Topics Concern    Not on file   Social History Narrative    Not on file       OBJECTIVE:     Vital Signs Range (Last 24H):         Significant Labs:  Lab Results   Component Value Date    WBC 9.10 04/10/2018    HGB 12.6 04/10/2018    HCT 39.5 04/10/2018     04/10/2018    CHOL 236 (H) 11/16/2017    TRIG 233 (H) 11/16/2017    HDL 80 (H) 11/16/2017    ALT 17 09/30/2017    AST 18 09/30/2017     04/10/2018    K 3.9 04/10/2018     04/10/2018    CREATININE 0.9 04/10/2018    BUN 11 04/10/2018    CO2 28 04/10/2018    TSH 0.869 11/16/2017    GLUF 94  09/20/2016    HGBA1C 5.6 11/16/2017       Diagnostic Studies:  NM Pet CT Routine  Right common iliac lymph node suspicious for malignancy.  This could be benign or malignant lymphoproliferative disorder metastatic disease.  This is an a risky position for percutaneous biopsy.    EKG:  Vent. Rate : 091 BPM     Atrial Rate : 091 BPM     P-R Int : 148 ms          QRS Dur : 080 ms      QT Int : 378 ms       P-R-T Axes : 071 098 071 degrees     QTc Int : 464 ms    Normal sinus rhythm  Rightward axis  When compared with ECG of 03-NOV-2016 12:32,  No significant change was found  Confirmed by GHADA CHANG MD ([id number]) on 9/30/2017 3:09:22 PM    2D ECHO:  No results found for this or any previous visit.      ASSESSMENT/PLAN:       Anesthesia Evaluation    I have reviewed the Patient Summary Reports.     I have reviewed the Medications.     Review of Systems  Anesthesia Hx:  No problems with previous Anesthesia  History of prior surgery of interest to airway management or planning: Previous anesthesia: General  Denies Personal Hx of Anesthesia complications.   Hematology/Oncology:  Hematology Normal   Oncology Normal     EENT/Dental:EENT/Dental Normal   Cardiovascular:   Hypertension    Pulmonary:  Pulmonary Normal    Renal/:  Renal/ Normal     Hepatic/GI:   Hiatal Hernia, GERD, poorly controlled    Musculoskeletal:   Arthritis     Neurological:   CVA (with LSW and facial droop), residual symptoms Diffuse pain syndrome with fibromyalgia.   Psych:   depression          Physical Exam  General:  Well nourished, Obesity    Airway/Jaw/Neck:  Airway Findings: Mouth Opening: Normal Tongue: Normal  General Airway Assessment: Adult  Mallampati: III  Improves to III with phonation.  TM Distance: Normal, at least 6 cm  Jaw/Neck Findings:  Neck ROM: Extension Decreased, Mild      Dental:  Dental Findings: In tact   Chest/Lungs:  Chest/Lungs Findings: Clear to auscultation, Normal Respiratory Rate     Heart/Vascular:  Heart Findings:  Rate: Normal  Rhythm: Regular Rhythm     Abdomen:  Abdomen Findings:  Normal, Soft, Nontender     Musculoskeletal:  Musculoskeletal Findings: Normal    Mental Status:  Mental Status Findings:  Cooperative, Alert and Oriented         Anesthesia Plan  Type of Anesthesia, risks & benefits discussed:  Anesthesia Type:  general  Patient's Preference: General  Intra-op Monitoring Plan: standard ASA monitors  Intra-op Monitoring Plan Comments:   Post Op Pain Control Plan: multimodal analgesia, IV/PO Opioids PRN and per primary service following discharge from PACU  Post Op Pain Control Plan Comments:   Induction:   IV  Beta Blocker:  Patient is not currently on a Beta-Blocker (No further documentation required).       Informed Consent: Patient understands risks and agrees with Anesthesia plan.  Questions answered. Anesthesia consent signed with patient.  ASA Score: 3     Day of Surgery Review of History & Physical:    H&P update referred to the surgeon.         Ready For Surgery From Anesthesia Perspective.

## 2018-09-17 ENCOUNTER — HOSPITAL ENCOUNTER (INPATIENT)
Facility: HOSPITAL | Age: 53
LOS: 3 days | Discharge: HOME | DRG: 825 | End: 2018-09-20
Attending: SURGERY | Admitting: SURGERY
Payer: MEDICAID

## 2018-09-17 ENCOUNTER — ANESTHESIA (OUTPATIENT)
Dept: SURGERY | Facility: HOSPITAL | Age: 53
DRG: 825 | End: 2018-09-17
Payer: MEDICAID

## 2018-09-17 DIAGNOSIS — R59.1 LYMPHADENOPATHY: ICD-10-CM

## 2018-09-17 DIAGNOSIS — R93.5 ABNORMAL CT OF THE ABDOMEN: Primary | ICD-10-CM

## 2018-09-17 PROCEDURE — 88184 FLOWCYTOMETRY/ TC 1 MARKER: CPT | Performed by: PATHOLOGY

## 2018-09-17 PROCEDURE — 25000003 PHARM REV CODE 250: Performed by: SURGERY

## 2018-09-17 PROCEDURE — 71000033 HC RECOVERY, INTIAL HOUR: Performed by: SURGERY

## 2018-09-17 PROCEDURE — 88189 FLOWCYTOMETRY/READ 16 & >: CPT | Mod: ,,, | Performed by: PATHOLOGY

## 2018-09-17 PROCEDURE — 49010 EXPLORATION BEHIND ABDOMEN: CPT | Mod: ,,, | Performed by: SURGERY

## 2018-09-17 PROCEDURE — 37000008 HC ANESTHESIA 1ST 15 MINUTES: Performed by: SURGERY

## 2018-09-17 PROCEDURE — 04BC0ZX EXCISION OF RIGHT COMMON ILIAC ARTERY, OPEN APPROACH, DIAGNOSTIC: ICD-10-PCS | Performed by: SURGERY

## 2018-09-17 PROCEDURE — 25000003 PHARM REV CODE 250: Performed by: STUDENT IN AN ORGANIZED HEALTH CARE EDUCATION/TRAINING PROGRAM

## 2018-09-17 PROCEDURE — 27201423 OPTIME MED/SURG SUP & DEVICES STERILE SUPPLY: Performed by: SURGERY

## 2018-09-17 PROCEDURE — 37000009 HC ANESTHESIA EA ADD 15 MINS: Performed by: SURGERY

## 2018-09-17 PROCEDURE — 25000003 PHARM REV CODE 250: Performed by: PHYSICIAN ASSISTANT

## 2018-09-17 PROCEDURE — 36000707: Performed by: SURGERY

## 2018-09-17 PROCEDURE — 27000221 HC OXYGEN, UP TO 24 HOURS

## 2018-09-17 PROCEDURE — 63600175 PHARM REV CODE 636 W HCPCS: Performed by: PHYSICIAN ASSISTANT

## 2018-09-17 PROCEDURE — 63600175 PHARM REV CODE 636 W HCPCS: Performed by: STUDENT IN AN ORGANIZED HEALTH CARE EDUCATION/TRAINING PROGRAM

## 2018-09-17 PROCEDURE — 11000001 HC ACUTE MED/SURG PRIVATE ROOM

## 2018-09-17 PROCEDURE — 94761 N-INVAS EAR/PLS OXIMETRY MLT: CPT

## 2018-09-17 PROCEDURE — 94799 UNLISTED PULMONARY SVC/PX: CPT

## 2018-09-17 PROCEDURE — 25000003 PHARM REV CODE 250: Performed by: ANESTHESIOLOGY

## 2018-09-17 PROCEDURE — 36000706: Performed by: SURGERY

## 2018-09-17 PROCEDURE — 88185 FLOWCYTOMETRY/TC ADD-ON: CPT | Performed by: PATHOLOGY

## 2018-09-17 PROCEDURE — 71000039 HC RECOVERY, EACH ADD'L HOUR: Performed by: SURGERY

## 2018-09-17 PROCEDURE — D9220A PRA ANESTHESIA: Mod: ,,, | Performed by: ANESTHESIOLOGY

## 2018-09-17 RX ORDER — SUCRALFATE 1 G/1
1 TABLET ORAL 2 TIMES DAILY
Status: DISCONTINUED | OUTPATIENT
Start: 2018-09-17 | End: 2018-09-20 | Stop reason: HOSPADM

## 2018-09-17 RX ORDER — LIDOCAINE HCL/PF 100 MG/5ML
SYRINGE (ML) INTRAVENOUS
Status: DISCONTINUED | OUTPATIENT
Start: 2018-09-17 | End: 2018-09-17

## 2018-09-17 RX ORDER — NALOXONE HCL 0.4 MG/ML
0.02 VIAL (ML) INJECTION
Status: DISCONTINUED | OUTPATIENT
Start: 2018-09-17 | End: 2018-09-20 | Stop reason: HOSPADM

## 2018-09-17 RX ORDER — ACETAMINOPHEN 10 MG/ML
INJECTION, SOLUTION INTRAVENOUS
Status: DISCONTINUED | OUTPATIENT
Start: 2018-09-17 | End: 2018-09-17

## 2018-09-17 RX ORDER — POLYETHYLENE GLYCOL 3350 17 G/17G
17 POWDER, FOR SOLUTION ORAL DAILY
Status: DISCONTINUED | OUTPATIENT
Start: 2018-09-18 | End: 2018-09-20 | Stop reason: HOSPADM

## 2018-09-17 RX ORDER — MIDAZOLAM HYDROCHLORIDE 1 MG/ML
INJECTION, SOLUTION INTRAMUSCULAR; INTRAVENOUS
Status: DISCONTINUED | OUTPATIENT
Start: 2018-09-17 | End: 2018-09-17

## 2018-09-17 RX ORDER — SODIUM CHLORIDE 9 MG/ML
INJECTION, SOLUTION INTRAVENOUS CONTINUOUS
Status: DISCONTINUED | OUTPATIENT
Start: 2018-09-17 | End: 2018-09-18

## 2018-09-17 RX ORDER — CEFAZOLIN SODIUM 1 G/3ML
2 INJECTION, POWDER, FOR SOLUTION INTRAMUSCULAR; INTRAVENOUS
Status: COMPLETED | OUTPATIENT
Start: 2018-09-17 | End: 2018-09-17

## 2018-09-17 RX ORDER — GABAPENTIN 300 MG/1
600 CAPSULE ORAL 2 TIMES DAILY
Status: DISCONTINUED | OUTPATIENT
Start: 2018-09-17 | End: 2018-09-20 | Stop reason: HOSPADM

## 2018-09-17 RX ORDER — DIPHENHYDRAMINE HCL 25 MG
25 CAPSULE ORAL EVERY 4 HOURS PRN
Status: DISCONTINUED | OUTPATIENT
Start: 2018-09-17 | End: 2018-09-20 | Stop reason: HOSPADM

## 2018-09-17 RX ORDER — SODIUM CHLORIDE 9 MG/ML
INJECTION, SOLUTION INTRAVENOUS CONTINUOUS
Status: DISCONTINUED | OUTPATIENT
Start: 2018-09-17 | End: 2018-09-19

## 2018-09-17 RX ORDER — GLYCOPYRROLATE 0.2 MG/ML
INJECTION INTRAMUSCULAR; INTRAVENOUS
Status: DISCONTINUED | OUTPATIENT
Start: 2018-09-17 | End: 2018-09-17

## 2018-09-17 RX ORDER — OXYCODONE AND ACETAMINOPHEN 5; 325 MG/1; MG/1
1 TABLET ORAL
Status: DISCONTINUED | OUTPATIENT
Start: 2018-09-17 | End: 2018-09-17 | Stop reason: HOSPADM

## 2018-09-17 RX ORDER — OXYCODONE AND ACETAMINOPHEN 5; 325 MG/1; MG/1
1 TABLET ORAL EVERY 4 HOURS PRN
Status: DISCONTINUED | OUTPATIENT
Start: 2018-09-17 | End: 2018-09-20 | Stop reason: HOSPADM

## 2018-09-17 RX ORDER — ROCURONIUM BROMIDE 10 MG/ML
INJECTION, SOLUTION INTRAVENOUS
Status: DISCONTINUED | OUTPATIENT
Start: 2018-09-17 | End: 2018-09-17

## 2018-09-17 RX ORDER — ONDANSETRON 2 MG/ML
4 INJECTION INTRAMUSCULAR; INTRAVENOUS ONCE AS NEEDED
Status: DISCONTINUED | OUTPATIENT
Start: 2018-09-17 | End: 2018-09-17 | Stop reason: HOSPADM

## 2018-09-17 RX ORDER — FENTANYL CITRATE 50 UG/ML
INJECTION, SOLUTION INTRAMUSCULAR; INTRAVENOUS
Status: DISCONTINUED | OUTPATIENT
Start: 2018-09-17 | End: 2018-09-17

## 2018-09-17 RX ORDER — ONDANSETRON 2 MG/ML
INJECTION INTRAMUSCULAR; INTRAVENOUS
Status: DISCONTINUED | OUTPATIENT
Start: 2018-09-17 | End: 2018-09-17

## 2018-09-17 RX ORDER — KETAMINE HCL IN 0.9 % NACL 50 MG/5 ML
SYRINGE (ML) INTRAVENOUS
Status: DISCONTINUED | OUTPATIENT
Start: 2018-09-17 | End: 2018-09-17

## 2018-09-17 RX ORDER — LIDOCAINE HYDROCHLORIDE 10 MG/ML
1 INJECTION, SOLUTION EPIDURAL; INFILTRATION; INTRACAUDAL; PERINEURAL ONCE
Status: COMPLETED | OUTPATIENT
Start: 2018-09-17 | End: 2018-09-17

## 2018-09-17 RX ORDER — ONDANSETRON 2 MG/ML
4 INJECTION INTRAMUSCULAR; INTRAVENOUS EVERY 8 HOURS PRN
Status: DISCONTINUED | OUTPATIENT
Start: 2018-09-17 | End: 2018-09-20 | Stop reason: HOSPADM

## 2018-09-17 RX ORDER — NEOSTIGMINE METHYLSULFATE 1 MG/ML
INJECTION, SOLUTION INTRAVENOUS
Status: DISCONTINUED | OUTPATIENT
Start: 2018-09-17 | End: 2018-09-17

## 2018-09-17 RX ORDER — ENOXAPARIN SODIUM 100 MG/ML
40 INJECTION SUBCUTANEOUS EVERY 24 HOURS
Status: DISCONTINUED | OUTPATIENT
Start: 2018-09-18 | End: 2018-09-20 | Stop reason: HOSPADM

## 2018-09-17 RX ORDER — SODIUM CHLORIDE 0.9 % (FLUSH) 0.9 %
3 SYRINGE (ML) INJECTION
Status: DISCONTINUED | OUTPATIENT
Start: 2018-09-17 | End: 2018-09-17 | Stop reason: HOSPADM

## 2018-09-17 RX ORDER — OXYCODONE AND ACETAMINOPHEN 10; 325 MG/1; MG/1
1 TABLET ORAL EVERY 4 HOURS PRN
Status: DISCONTINUED | OUTPATIENT
Start: 2018-09-17 | End: 2018-09-20 | Stop reason: HOSPADM

## 2018-09-17 RX ORDER — TRAZODONE HYDROCHLORIDE 50 MG/1
50 TABLET ORAL NIGHTLY
Status: DISCONTINUED | OUTPATIENT
Start: 2018-09-17 | End: 2018-09-20 | Stop reason: HOSPADM

## 2018-09-17 RX ORDER — DULOXETIN HYDROCHLORIDE 60 MG/1
60 CAPSULE, DELAYED RELEASE ORAL DAILY
Status: DISCONTINUED | OUTPATIENT
Start: 2018-09-18 | End: 2018-09-20 | Stop reason: HOSPADM

## 2018-09-17 RX ORDER — PANTOPRAZOLE SODIUM 40 MG/1
40 TABLET, DELAYED RELEASE ORAL DAILY
Status: DISCONTINUED | OUTPATIENT
Start: 2018-09-18 | End: 2018-09-20 | Stop reason: HOSPADM

## 2018-09-17 RX ORDER — PROPOFOL 10 MG/ML
VIAL (ML) INTRAVENOUS
Status: DISCONTINUED | OUTPATIENT
Start: 2018-09-17 | End: 2018-09-17

## 2018-09-17 RX ORDER — HYDROMORPHONE HYDROCHLORIDE 1 MG/ML
0.2 INJECTION, SOLUTION INTRAMUSCULAR; INTRAVENOUS; SUBCUTANEOUS EVERY 5 MIN PRN
Status: DISCONTINUED | OUTPATIENT
Start: 2018-09-17 | End: 2018-09-17 | Stop reason: HOSPADM

## 2018-09-17 RX ADMIN — TRAZODONE HYDROCHLORIDE 50 MG: 50 TABLET ORAL at 09:09

## 2018-09-17 RX ADMIN — SODIUM CHLORIDE: 0.9 INJECTION, SOLUTION INTRAVENOUS at 11:09

## 2018-09-17 RX ADMIN — ROCURONIUM BROMIDE 40 MG: 10 INJECTION, SOLUTION INTRAVENOUS at 01:09

## 2018-09-17 RX ADMIN — NEOSTIGMINE METHYLSULFATE 5 MG: 1 INJECTION INTRAVENOUS at 02:09

## 2018-09-17 RX ADMIN — CEFAZOLIN 2 G: 330 INJECTION, POWDER, FOR SOLUTION INTRAMUSCULAR; INTRAVENOUS at 01:09

## 2018-09-17 RX ADMIN — PROPOFOL 200 MG: 10 INJECTION, EMULSION INTRAVENOUS at 01:09

## 2018-09-17 RX ADMIN — Medication 0.2 MG: at 03:09

## 2018-09-17 RX ADMIN — Medication 10 MG: at 02:09

## 2018-09-17 RX ADMIN — Medication 0.2 MG: at 04:09

## 2018-09-17 RX ADMIN — GABAPENTIN 600 MG: 300 CAPSULE ORAL at 09:09

## 2018-09-17 RX ADMIN — SUCRALFATE 1 G: 1 TABLET ORAL at 09:09

## 2018-09-17 RX ADMIN — ACETAMINOPHEN 1000 MG: 10 INJECTION, SOLUTION INTRAVENOUS at 01:09

## 2018-09-17 RX ADMIN — GLYCOPYRROLATE 0.6 MG: 0.2 INJECTION, SOLUTION INTRAMUSCULAR; INTRAVENOUS at 02:09

## 2018-09-17 RX ADMIN — SODIUM CHLORIDE: 0.9 INJECTION, SOLUTION INTRAVENOUS at 06:09

## 2018-09-17 RX ADMIN — LIDOCAINE HYDROCHLORIDE 100 MG: 20 INJECTION, SOLUTION INTRAVENOUS at 01:09

## 2018-09-17 RX ADMIN — ROCURONIUM BROMIDE 10 MG: 10 INJECTION, SOLUTION INTRAVENOUS at 02:09

## 2018-09-17 RX ADMIN — Medication 0.2 MG: at 05:09

## 2018-09-17 RX ADMIN — ONDANSETRON 4 MG: 2 INJECTION INTRAMUSCULAR; INTRAVENOUS at 02:09

## 2018-09-17 RX ADMIN — FENTANYL CITRATE 25 MCG: 50 INJECTION, SOLUTION INTRAMUSCULAR; INTRAVENOUS at 02:09

## 2018-09-17 RX ADMIN — ROCURONIUM BROMIDE 10 MG: 10 INJECTION, SOLUTION INTRAVENOUS at 01:09

## 2018-09-17 RX ADMIN — Medication 30 MG: at 01:09

## 2018-09-17 RX ADMIN — FENTANYL CITRATE 25 MCG: 50 INJECTION, SOLUTION INTRAMUSCULAR; INTRAVENOUS at 01:09

## 2018-09-17 RX ADMIN — FENTANYL CITRATE 50 MCG: 50 INJECTION, SOLUTION INTRAMUSCULAR; INTRAVENOUS at 02:09

## 2018-09-17 RX ADMIN — LIDOCAINE HYDROCHLORIDE 10 MG: 10 INJECTION, SOLUTION EPIDURAL; INFILTRATION; INTRACAUDAL; PERINEURAL at 11:09

## 2018-09-17 RX ADMIN — SODIUM CHLORIDE: 0.9 INJECTION, SOLUTION INTRAVENOUS at 01:09

## 2018-09-17 RX ADMIN — FENTANYL CITRATE 25 MCG: 50 INJECTION, SOLUTION INTRAMUSCULAR; INTRAVENOUS at 03:09

## 2018-09-17 RX ADMIN — FENTANYL CITRATE 50 MCG: 50 INJECTION, SOLUTION INTRAMUSCULAR; INTRAVENOUS at 01:09

## 2018-09-17 RX ADMIN — MIDAZOLAM HYDROCHLORIDE 2 MG: 1 INJECTION, SOLUTION INTRAMUSCULAR; INTRAVENOUS at 01:09

## 2018-09-17 RX ADMIN — SODIUM CHLORIDE, SODIUM GLUCONATE, SODIUM ACETATE, POTASSIUM CHLORIDE, MAGNESIUM CHLORIDE, SODIUM PHOSPHATE, DIBASIC, AND POTASSIUM PHOSPHATE: .53; .5; .37; .037; .03; .012; .00082 INJECTION, SOLUTION INTRAVENOUS at 02:09

## 2018-09-17 RX ADMIN — OXYCODONE HYDROCHLORIDE AND ACETAMINOPHEN 1 TABLET: 5; 325 TABLET ORAL at 03:09

## 2018-09-17 NOTE — OP NOTE
DATE OF PROCEDURE:  09/17/2018    PREOPERATIVE DIAGNOSIS:  Retroperitoneal lymphadenopathy.    POSTOPERATIVE DIAGNOSIS:  Retroperitoneal lymphadenopathy.    PROCEDURE PERFORMED:  Incisional biopsy of a common iliac lymph node.    SURGEON:  Sebas Reed M.D.    ASSISTANT:  Dr. Joyner.    ANESTHESIA:  General.    BLOOD LOSS:  Minimal.    COMPLICATIONS:  None.    INDICATIONS:  This 53-year-old patient had an incidental finding of an enlarged   lymph node, which has been followed.  It demonstrated enlargement and on PET   scan has an associated SUV of greater than 10, raising the concern for   malignancy.  The patient has a multitude of systemic illnesses that are   difficult to explain and so therefore the need to rule out malignancy is of   increased importance.    OPERATIVE REPORT IN DETAIL:  The patient was brought to the Operating Room,   placed in the supine position, and prepped and draped in sterile fashion.  Once   satisfactory general anesthesia was induced, an incision was made in the right   lower abdomen along the inguinal ligament and then curving up toward the   anterior superior iliac spine.  Skin and subcutaneous tissues were divided.  The   abdominal wall musculature was divided laterally, sweeping the peritoneum and   its contents medially.  The external iliac artery was identified and then   followed proximally at the bifurcation of the right and left of the common iliac   into the right internal and external iliac.  A hard 25 mm lymph node was   identified.  This was wedged between the pelvic bone, the ureter and the ureter   anteriorly, the pelvis posteriorly and the iliac artery and vein medially.  A   decision was made to simply perform an incisional biopsy, which was done with a   knife without cautery in order to provide the optimal specimen.  Specimen was   placed in saline and sent immediately to Pathology for lymphoma studies.    Hemostasis was achieved.  Oozing was controlled with  the application of Darell:    The abdominal wall was closed in layers of absorbable suture, starting with a   #1 PDS and then a 2-0 and 3-0 Vicryl.  Subcutaneous was irrigated and skin was   reapproximated with clips.  Needle, sponge and instrument counts were correct.    The patient remained stable throughout.      GF/HN  dd: 09/17/2018 14:53:48 (CDT)  td: 09/17/2018 18:17:24 (CDT)  Doc ID   #[id number]  Job ID #[id number]    CC:

## 2018-09-17 NOTE — TRANSFER OF CARE
"Anesthesia Transfer of Care Note    Patient: Edita Riley    Procedure(s) Performed: Procedure(s) (LRB):  LYMPHADENECTOMY, RETROPERITONEUM (Right)    Patient location: PACU    Anesthesia Type: general    Transport from OR: Transported from OR on 6-10 L/min O2 by face mask with adequate spontaneous ventilation    Post pain: adequate analgesia    Post assessment: no apparent anesthetic complications    Post vital signs: stable    Level of consciousness: awake    Nausea/Vomiting: no nausea/vomiting    Complications: none    Transfer of care protocol was followed      Last vitals:   Visit Vitals  BP (!) 175/80 (BP Location: Left arm, Patient Position: Lying)   Pulse 82   Temp 36.6 °C (97.9 °F) (Oral)   Resp 17   Ht 5' 9" (1.753 m)   Wt 107.5 kg (237 lb)   LMP 06/08/2014 (Approximate)   SpO2 97%   Breastfeeding? No   BMI 35.00 kg/m²     "

## 2018-09-17 NOTE — NURSING TRANSFER
Nursing Transfer Note      9/17/2018     Transfer 523 B    Transfer via bed    Transfer with SCDs. 2 L NC    Transported by x 2 transporter    Medicines sent: none    Chart send with patient: yes    Notified:, RN on the floor    Patient reassessed at: upon arrival to the unit

## 2018-09-17 NOTE — INTERVAL H&P NOTE
The patient has been examined and the H&P has been reviewed:    I concur with the findings and no changes have occurred since H&P was written.    Anesthesia/Surgery risks, benefits and alternative options discussed and understood by patient/family.          Active Hospital Problems    Diagnosis  POA    Lymphadenopathy [R59.1]  Yes      Resolved Hospital Problems   No resolved problems to display.

## 2018-09-17 NOTE — PLAN OF CARE
POC reviewed with pt, acknowledged understanding. Pt AAO x 4. Pt remains free of falls/injuries. Pt on telemetry remains SR. Pt tolerating clear liquid diet. Pt pain controlled with prescribed meds. Pt wearing SCD pumps. Pt on 2 L NC denies SOB. No acute events throughout shift. No distress noted, will continue to monitor.

## 2018-09-17 NOTE — BRIEF OP NOTE
[facility]  Brief Operative Note    SUMMARY     Surgery Date: 9/17/2018     Surgeon(s) and Role:     * Sebas Reed MD - Primary     * Mehga Joyner MD - Resident - Assisting        Pre-op Diagnosis:  Lymphadenopathy [R59.1]    Post-op Diagnosis:  Post-Op Diagnosis Codes:     * Lymphadenopathy [R59.1]    Procedure(s) (LRB):  LYMPHADENECTOMY, RETROPERITONEUM (Right)    Anesthesia: General    Description of Procedure: Retroperitoneal approach incisional biopsy of approx 2 cm hard lymph node by right iliac artery and right ureter    Description of the findings of the procedure: see op note    Estimated Blood Loss: * No values recorded between 9/17/2018  1:43 PM and 9/17/2018  3:10 PM *         Specimens:   Specimen (12h ago, onward)    None

## 2018-09-18 LAB
ANION GAP SERPL CALC-SCNC: 9 MMOL/L
BASOPHILS # BLD AUTO: 0.04 K/UL
BASOPHILS NFR BLD: 0.3 %
BUN SERPL-MCNC: 8 MG/DL
CALCIUM SERPL-MCNC: 9 MG/DL
CHLORIDE SERPL-SCNC: 103 MMOL/L
CO2 SERPL-SCNC: 26 MMOL/L
CREAT SERPL-MCNC: 0.8 MG/DL
DIFFERENTIAL METHOD: ABNORMAL
EOSINOPHIL # BLD AUTO: 0.1 K/UL
EOSINOPHIL NFR BLD: 0.6 %
ERYTHROCYTE [DISTWIDTH] IN BLOOD BY AUTOMATED COUNT: 14.4 %
EST. GFR  (AFRICAN AMERICAN): >60 ML/MIN/1.73 M^2
EST. GFR  (NON AFRICAN AMERICAN): >60 ML/MIN/1.73 M^2
FLOW CYTOMETRY ANTIBODIES ANALYZED - TISSUE: NORMAL
FLOW CYTOMETRY COMMENT - TISSUE: NORMAL
FLOW CYTOMETRY INTERPRETATION - TISSUE: NORMAL
GLUCOSE SERPL-MCNC: 128 MG/DL
HCT VFR BLD AUTO: 41.5 %
HGB BLD-MCNC: 12.5 G/DL
IMM GRANULOCYTES # BLD AUTO: 0.02 K/UL
IMM GRANULOCYTES NFR BLD AUTO: 0.2 %
LYMPHOCYTES # BLD AUTO: 1.8 K/UL
LYMPHOCYTES NFR BLD: 15.2 %
MAGNESIUM SERPL-MCNC: 2 MG/DL
MCH RBC QN AUTO: 27.7 PG
MCHC RBC AUTO-ENTMCNC: 30.1 G/DL
MCV RBC AUTO: 92 FL
MONOCYTES # BLD AUTO: 1.3 K/UL
MONOCYTES NFR BLD: 10.8 %
NEUTROPHILS # BLD AUTO: 8.5 K/UL
NEUTROPHILS NFR BLD: 72.9 %
NRBC BLD-RTO: 0 /100 WBC
PHOSPHATE SERPL-MCNC: 3.3 MG/DL
PLATELET # BLD AUTO: 297 K/UL
PMV BLD AUTO: 10.9 FL
POTASSIUM SERPL-SCNC: 4.2 MMOL/L
RBC # BLD AUTO: 4.51 M/UL
SODIUM SERPL-SCNC: 138 MMOL/L
SPECIMEN TYPE - TISSUE: NORMAL
WBC # BLD AUTO: 11.63 K/UL

## 2018-09-18 PROCEDURE — 88305 TISSUE EXAM BY PATHOLOGIST: CPT | Performed by: PATHOLOGY

## 2018-09-18 PROCEDURE — 88342 IMHCHEM/IMCYTCHM 1ST ANTB: CPT | Mod: 26,,, | Performed by: PATHOLOGY

## 2018-09-18 PROCEDURE — 11000001 HC ACUTE MED/SURG PRIVATE ROOM

## 2018-09-18 PROCEDURE — 84100 ASSAY OF PHOSPHORUS: CPT

## 2018-09-18 PROCEDURE — 63600175 PHARM REV CODE 636 W HCPCS: Performed by: SURGERY

## 2018-09-18 PROCEDURE — 88342 IMHCHEM/IMCYTCHM 1ST ANTB: CPT | Performed by: PATHOLOGY

## 2018-09-18 PROCEDURE — 88341 IMHCHEM/IMCYTCHM EA ADD ANTB: CPT | Mod: 26,,, | Performed by: PATHOLOGY

## 2018-09-18 PROCEDURE — 94799 UNLISTED PULMONARY SVC/PX: CPT

## 2018-09-18 PROCEDURE — 88305 TISSUE EXAM BY PATHOLOGIST: CPT | Mod: 26,,, | Performed by: PATHOLOGY

## 2018-09-18 PROCEDURE — 83735 ASSAY OF MAGNESIUM: CPT

## 2018-09-18 PROCEDURE — 25000003 PHARM REV CODE 250: Performed by: SURGERY

## 2018-09-18 PROCEDURE — 36415 COLL VENOUS BLD VENIPUNCTURE: CPT

## 2018-09-18 PROCEDURE — 80048 BASIC METABOLIC PNL TOTAL CA: CPT

## 2018-09-18 PROCEDURE — 85025 COMPLETE CBC W/AUTO DIFF WBC: CPT

## 2018-09-18 RX ADMIN — ENOXAPARIN SODIUM 40 MG: 100 INJECTION SUBCUTANEOUS at 06:09

## 2018-09-18 RX ADMIN — SODIUM CHLORIDE: 0.9 INJECTION, SOLUTION INTRAVENOUS at 07:09

## 2018-09-18 RX ADMIN — SUCRALFATE 1 G: 1 TABLET ORAL at 09:09

## 2018-09-18 RX ADMIN — OXYCODONE HYDROCHLORIDE AND ACETAMINOPHEN 1 TABLET: 10; 325 TABLET ORAL at 07:09

## 2018-09-18 RX ADMIN — PANTOPRAZOLE SODIUM 40 MG: 40 TABLET, DELAYED RELEASE ORAL at 08:09

## 2018-09-18 RX ADMIN — GABAPENTIN 600 MG: 300 CAPSULE ORAL at 08:09

## 2018-09-18 RX ADMIN — SUCRALFATE 1 G: 1 TABLET ORAL at 08:09

## 2018-09-18 RX ADMIN — SODIUM CHLORIDE: 0.9 INJECTION, SOLUTION INTRAVENOUS at 06:09

## 2018-09-18 RX ADMIN — TRAZODONE HYDROCHLORIDE 50 MG: 50 TABLET ORAL at 09:09

## 2018-09-18 RX ADMIN — DULOXETINE 60 MG: 60 CAPSULE, DELAYED RELEASE ORAL at 08:09

## 2018-09-18 RX ADMIN — GABAPENTIN 600 MG: 300 CAPSULE ORAL at 09:09

## 2018-09-18 RX ADMIN — POLYETHYLENE GLYCOL 3350 17 G: 17 POWDER, FOR SOLUTION ORAL at 08:09

## 2018-09-18 NOTE — PLAN OF CARE
Problem: Mobility, Physical Impaired (Adult)  Intervention: [employer]  Pt sqat up in chair a few hours, abulated to chair with assist x2

## 2018-09-18 NOTE — SUBJECTIVE & OBJECTIVE
Interval History:   Patient seen and examined, no acute events overnight  Denies N/V/F/BM  +abdominal pain  Afebrile/VSS    Medications:  Continuous Infusions:   sodium chloride 0.9% 75 mL/hr at 09/18/18 0714     Scheduled Meds:   DULoxetine  60 mg Oral Daily    enoxaparin  40 mg Subcutaneous Daily    gabapentin  600 mg Oral BID    pantoprazole  40 mg Oral Daily    polyethylene glycol  17 g Oral Daily    sucralfate  1 g Oral BID    traZODone  50 mg Oral QHS     PRN Meds:diphenhydrAMINE, naloxone, ondansetron, oxyCODONE-acetaminophen, oxyCODONE-acetaminophen, promethazine (PHENERGAN) IVPB     Review of patient's allergies indicates:   Allergen Reactions    Bee sting [allergen ext-venom-honey bee]      Rash      Grass pollen-bermuda, standard      rash     Objective:     Vital Signs (Most Recent):  Temp: 97.8 °F (36.6 °C) (09/18/18 0544)  Pulse: 94 (09/18/18 0544)  Resp: 18 (09/18/18 0544)  BP: 136/72 (09/18/18 0544)  SpO2: 98 % (09/18/18 0544) Vital Signs (24h Range):  Temp:  [96.3 °F (35.7 °C)-98.7 °F (37.1 °C)] 97.8 °F (36.6 °C)  Pulse:  [73-94] 94  Resp:  [7-20] 18  SpO2:  [92 %-100 %] 98 %  BP: (119-175)/(56-94) 136/72     Weight: 114 kg (251 lb 5.2 oz)  Body mass index is 37.11 kg/m².    Intake/Output - Last 3 Shifts       09/16 0700 - 09/17 0659 09/17 0700 - 09/18 0659 09/18 0700 - 09/19 0659    P.O.  420     I.V. (mL/kg)  1990 (17.5)     Total Intake(mL/kg)  2410 (21.1)     Urine (mL/kg/hr)  1575     Total Output  1575     Net  +835                  Physical Exam   Constitutional: She appears well-developed and well-nourished. No distress.   HENT:   Head: Normocephalic and atraumatic.   Cardiovascular: Normal rate and regular rhythm.   Pulmonary/Chest: Effort normal. No respiratory distress.   Abdominal:   Soft, appropriate TTP  Surgical dressing in place - clean, dry and intact       Significant Labs:  BMP:   Recent Labs   Lab  09/18/18   0526   GLU  128*   NA  138   K  4.2   CL  103   CO2  26   BUN   8   CREATININE  0.8   CALCIUM  9.0   MG  2.0     CMP:   Recent Labs   Lab  09/18/18   0526   GLU  128*   CALCIUM  9.0   NA  138   K  4.2   CO2  26   CL  103   BUN  8   CREATININE  0.8

## 2018-09-18 NOTE — PROGRESS NOTES
[facility]  General Surgery  Progress Note    Subjective:       Post-Op Info:  Procedure(s) (LRB):  LYMPHADENECTOMY, RETROPERITONEUM (Right)   1 Day Post-Op     Interval History:   Patient seen and examined, no acute events overnight  Denies N/V/F/BM  +abdominal pain  Afebrile/VSS    Medications:  Continuous Infusions:   sodium chloride 0.9% 75 mL/hr at 09/18/18 0714     Scheduled Meds:   DULoxetine  60 mg Oral Daily    enoxaparin  40 mg Subcutaneous Daily    gabapentin  600 mg Oral BID    pantoprazole  40 mg Oral Daily    polyethylene glycol  17 g Oral Daily    sucralfate  1 g Oral BID    traZODone  50 mg Oral QHS     PRN Meds:diphenhydrAMINE, naloxone, ondansetron, oxyCODONE-acetaminophen, oxyCODONE-acetaminophen, promethazine (PHENERGAN) IVPB     Review of patient's allergies indicates:   Allergen Reactions    Bee sting [allergen ext-venom-honey bee]      Rash      Grass pollen-bermuda, standard      rash     Objective:     Vital Signs (Most Recent):  Temp: 97.8 °F (36.6 °C) (09/18/18 0544)  Pulse: 94 (09/18/18 0544)  Resp: 18 (09/18/18 0544)  BP: 136/72 (09/18/18 0544)  SpO2: 98 % (09/18/18 0544) Vital Signs (24h Range):  Temp:  [96.3 °F (35.7 °C)-98.7 °F (37.1 °C)] 97.8 °F (36.6 °C)  Pulse:  [73-94] 94  Resp:  [7-20] 18  SpO2:  [92 %-100 %] 98 %  BP: (119-175)/(56-94) 136/72     Weight: 114 kg (251 lb 5.2 oz)  Body mass index is 37.11 kg/m².    Intake/Output - Last 3 Shifts       09/16 0700 - 09/17 0659 09/17 0700 - 09/18 0659 09/18 0700 - 09/19 0659    P.O.  420     I.V. (mL/kg)  1990 (17.5)     Total Intake(mL/kg)  2410 (21.1)     Urine (mL/kg/hr)  1575     Total Output  1575     Net  +835                  Physical Exam   Constitutional: She appears well-developed and well-nourished. No distress.   HENT:   Head: Normocephalic and atraumatic.   Cardiovascular: Normal rate and regular rhythm.   Pulmonary/Chest: Effort normal. No respiratory distress.   Abdominal:   Soft,  appropriate TTP  Surgical dressing in place - clean, dry and intact       Significant Labs:  BMP:   Recent Labs   Lab  09/18/18   0526   GLU  128*   NA  138   K  4.2   CL  103   CO2  26   BUN  8   CREATININE  0.8   CALCIUM  9.0   MG  2.0     CMP:   Recent Labs   Lab  09/18/18   0526   GLU  128*   CALCIUM  9.0   NA  138   K  4.2   CO2  26   CL  103   BUN  8   CREATININE  0.8     Assessment/Plan:     * Lymphadenopathy    52 yo female s/p retroperitoneal lymph node excision    -regular diet  -IVF NS 75  -d/c nino  -pain/nausea meds PRN  -bowel regimen  -DVT/GI prophylaxis        Sleep stage dysfunction    Home meds  -trazadone        Fibromyalgia    Home meds  -gabapentin        Depression    Home meds  -cymbalta            STACEY AdanC   [id number]  General Surgery  [facility]

## 2018-09-18 NOTE — ASSESSMENT & PLAN NOTE
54 yo female s/p retroperitoneal lymph node excision    -regular diet  -IVF NS 75  -d/c nino  -pain/nausea meds PRN  -bowel regimen  -DVT/GI prophylaxis

## 2018-09-18 NOTE — PLAN OF CARE
Patient lives in a 1 story house w/3 MANAV, w/railings, w/spouse & daughter. Patient was independent & agile prior to admit. No needs determined.        09/18/18 1215   Discharge Assessment   Assessment Type Discharge Planning Assessment   Confirmed/corrected address and phone number on facesheet? Yes   Assessment information obtained from? Patient;Medical Record;Other  (Spouse)   Expected Length of Stay (days) 2   Communicated expected length of stay with patient/caregiver no  (Per MD)   Prior to hospitilization cognitive status: Alert/Oriented;No Deficits   Prior to hospitalization functional status: Independent   Current cognitive status: Alert/Oriented;No Deficits   Current Functional Status: Independent;Needs Assistance   Facility Arrived From: (N/A)   Lives With spouse;child(abigail), dependent  (18 yr old daughter)   Able to Return to Prior Arrangements yes   Is patient able to care for self after discharge? Yes   Who are your caregiver(s) and their phone number(s)? (Hammad Riley Spouse [phone] )   Patient's perception of discharge disposition home or selfcare   Readmission Within The Last 30 Days no previous admission in last 30 days   Patient currently being followed by outpatient case management? No   Patient currently receives any other outside agency services? No   Equipment Currently Used at Home none   Do you have any problems affording any of your prescribed medications? No   Is the patient taking medications as prescribed? yes   Does the patient have transportation home? Yes   Transportation Available car;family or friend will provide   Dialysis Name and Scheduled days N/A   Does the patient receive services at the [facility]? No   Discharge Plan A Home with family   Discharge Plan B Home with family   Patient/Family In Agreement With Plan yes

## 2018-09-18 NOTE — ANESTHESIA POSTPROCEDURE EVALUATION
"Anesthesia Post Evaluation    Patient: Edita Riley    Procedure(s) Performed: Procedure(s) (LRB):  LYMPHADENECTOMY, RETROPERITONEUM (Right)      Patient location during evaluation: floor  Patient participation: Yes- Able to Participate  Level of consciousness: awake and alert and oriented  Post-procedure vital signs: reviewed and stable  Pain management: adequate  Airway patency: patent  PONV status at discharge: No PONV  Anesthetic complications: no      Cardiovascular status: blood pressure returned to baseline and hemodynamically stable  Respiratory status: unassisted and spontaneous ventilation  Hydration status: euvolemic  Follow-up not needed.        Visit Vitals  /62 (BP Location: Left arm, Patient Position: Sitting)   Pulse 93   Temp 37 °C (98.6 °F) (Oral)   Resp 18   Ht 5' 9" (1.753 m)   Wt 114 kg (251 lb 5.2 oz)   LMP 06/08/2014 (Approximate)   SpO2 99%   Breastfeeding? No   BMI 37.11 kg/m²       Pain/Caitie Score: Pain Assessment Performed: Yes (9/18/2018  6:00 AM)  Presence of Pain: denies (9/18/2018  6:00 AM)  Pain Rating Prior to Med Admin: 8 (9/18/2018  7:16 AM)  Caitie Score: 9 (9/17/2018  5:38 PM)        "

## 2018-09-19 ENCOUNTER — TELEPHONE (OUTPATIENT)
Dept: SURGERY | Facility: CLINIC | Age: 53
End: 2018-09-19

## 2018-09-19 LAB
ANION GAP SERPL CALC-SCNC: 5 MMOL/L
BASOPHILS # BLD AUTO: 0.04 K/UL
BASOPHILS NFR BLD: 0.3 %
BUN SERPL-MCNC: 7 MG/DL
CALCIUM SERPL-MCNC: 9.1 MG/DL
CHLORIDE SERPL-SCNC: 106 MMOL/L
CO2 SERPL-SCNC: 28 MMOL/L
CREAT SERPL-MCNC: 0.9 MG/DL
DIFFERENTIAL METHOD: ABNORMAL
EOSINOPHIL # BLD AUTO: 0.2 K/UL
EOSINOPHIL NFR BLD: 1.4 %
ERYTHROCYTE [DISTWIDTH] IN BLOOD BY AUTOMATED COUNT: 14.6 %
EST. GFR  (AFRICAN AMERICAN): >60 ML/MIN/1.73 M^2
EST. GFR  (NON AFRICAN AMERICAN): >60 ML/MIN/1.73 M^2
GLUCOSE SERPL-MCNC: 126 MG/DL
HCT VFR BLD AUTO: 39.1 %
HGB BLD-MCNC: 12.2 G/DL
IMM GRANULOCYTES # BLD AUTO: 0.07 K/UL
IMM GRANULOCYTES NFR BLD AUTO: 0.5 %
LYMPHOCYTES # BLD AUTO: 2.4 K/UL
LYMPHOCYTES NFR BLD: 17.9 %
MAGNESIUM SERPL-MCNC: 2.2 MG/DL
MCH RBC QN AUTO: 28.4 PG
MCHC RBC AUTO-ENTMCNC: 31.2 G/DL
MCV RBC AUTO: 91 FL
MONOCYTES # BLD AUTO: 1.9 K/UL
MONOCYTES NFR BLD: 14.3 %
NEUTROPHILS # BLD AUTO: 8.7 K/UL
NEUTROPHILS NFR BLD: 65.6 %
NRBC BLD-RTO: 0 /100 WBC
PHOSPHATE SERPL-MCNC: 2.3 MG/DL
PLATELET # BLD AUTO: 260 K/UL
PMV BLD AUTO: 10.6 FL
POTASSIUM SERPL-SCNC: 4.1 MMOL/L
RBC # BLD AUTO: 4.29 M/UL
SODIUM SERPL-SCNC: 139 MMOL/L
WBC # BLD AUTO: 13.31 K/UL

## 2018-09-19 PROCEDURE — 63600175 PHARM REV CODE 636 W HCPCS: Performed by: SURGERY

## 2018-09-19 PROCEDURE — 84100 ASSAY OF PHOSPHORUS: CPT

## 2018-09-19 PROCEDURE — 97535 SELF CARE MNGMENT TRAINING: CPT

## 2018-09-19 PROCEDURE — 36415 COLL VENOUS BLD VENIPUNCTURE: CPT

## 2018-09-19 PROCEDURE — 25000003 PHARM REV CODE 250: Performed by: SURGERY

## 2018-09-19 PROCEDURE — 80048 BASIC METABOLIC PNL TOTAL CA: CPT

## 2018-09-19 PROCEDURE — 97165 OT EVAL LOW COMPLEX 30 MIN: CPT

## 2018-09-19 PROCEDURE — 63600175 PHARM REV CODE 636 W HCPCS: Performed by: STUDENT IN AN ORGANIZED HEALTH CARE EDUCATION/TRAINING PROGRAM

## 2018-09-19 PROCEDURE — 85025 COMPLETE CBC W/AUTO DIFF WBC: CPT

## 2018-09-19 PROCEDURE — 11000001 HC ACUTE MED/SURG PRIVATE ROOM

## 2018-09-19 PROCEDURE — 97161 PT EVAL LOW COMPLEX 20 MIN: CPT

## 2018-09-19 PROCEDURE — 25000003 PHARM REV CODE 250: Performed by: STUDENT IN AN ORGANIZED HEALTH CARE EDUCATION/TRAINING PROGRAM

## 2018-09-19 PROCEDURE — 83735 ASSAY OF MAGNESIUM: CPT

## 2018-09-19 RX ORDER — KETOROLAC TROMETHAMINE 30 MG/ML
30 INJECTION, SOLUTION INTRAMUSCULAR; INTRAVENOUS EVERY 8 HOURS
Status: DISCONTINUED | OUTPATIENT
Start: 2018-09-19 | End: 2018-09-20 | Stop reason: HOSPADM

## 2018-09-19 RX ORDER — SODIUM,POTASSIUM PHOSPHATES 280-250MG
2 POWDER IN PACKET (EA) ORAL
Status: DISPENSED | OUTPATIENT
Start: 2018-09-19 | End: 2018-09-19

## 2018-09-19 RX ADMIN — SODIUM CHLORIDE, SODIUM LACTATE, POTASSIUM CHLORIDE, AND CALCIUM CHLORIDE 1000 ML: .6; .31; .03; .02 INJECTION, SOLUTION INTRAVENOUS at 03:09

## 2018-09-19 RX ADMIN — DULOXETINE 60 MG: 60 CAPSULE, DELAYED RELEASE ORAL at 08:09

## 2018-09-19 RX ADMIN — GABAPENTIN 600 MG: 300 CAPSULE ORAL at 08:09

## 2018-09-19 RX ADMIN — POLYETHYLENE GLYCOL 3350 17 G: 17 POWDER, FOR SOLUTION ORAL at 08:09

## 2018-09-19 RX ADMIN — SUCRALFATE 1 G: 1 TABLET ORAL at 08:09

## 2018-09-19 RX ADMIN — TRAZODONE HYDROCHLORIDE 50 MG: 50 TABLET ORAL at 08:09

## 2018-09-19 RX ADMIN — KETOROLAC TROMETHAMINE 30 MG: 30 INJECTION, SOLUTION INTRAMUSCULAR at 10:09

## 2018-09-19 RX ADMIN — PANTOPRAZOLE SODIUM 40 MG: 40 TABLET, DELAYED RELEASE ORAL at 08:09

## 2018-09-19 RX ADMIN — KETOROLAC TROMETHAMINE 30 MG: 30 INJECTION, SOLUTION INTRAMUSCULAR at 05:09

## 2018-09-19 RX ADMIN — POTASSIUM & SODIUM PHOSPHATES POWDER PACK 280-160-250 MG 2 PACKET: 280-160-250 PACK at 01:09

## 2018-09-19 RX ADMIN — ENOXAPARIN SODIUM 40 MG: 100 INJECTION SUBCUTANEOUS at 05:09

## 2018-09-19 NOTE — PT/OT/SLP EVAL
Occupational Therapy   Evaluation    Name: Edita Riley  MRN: [id number]  Admitting Diagnosis:  Lymphadenopathy 2 Days Post-Op    Recommendations:     Discharge Recommendations: home with home health  Discharge Equipment Recommendations:  none  Barriers to discharge:  None    History:     Occupational Profile:  Living Environment: Pt lives in a h w/ spouse and daughter w/ 3 steps to enter and L HR.   Previous level of function: Indep  Roles and Routines: N/A  Equipment Used at Home:  none  Assistance upon Discharge: Pt has assistance upon D/C.     Past Medical History:   Diagnosis Date    Cervical cancer 2014    Chronic back pain     Fibromyalgia     GERD (gastroesophageal reflux disease)     Hiatal hernia 2014    Hypertension     Lactose intolerance     Osteoarthritis of back     Stroke 1972       Past Surgical History:   Procedure Laterality Date    BILATERAL OOPHORECTOMY  2015    CHOLECYSTECTOMY  11/09/2016    Done at [facility], path showed chronic cholecystitis and gallstones    CHOLECYSTECTOMY-LAPAROSCOPIC N/A 11/9/2016    [facility] 2ND FLR    cold knife conization  2014    COLONOSCOPY  2014    [facility]    COLONOSCOPY N/A 5/18/2018    Procedure: COLONOSCOPY;  Surgeon: Arden Gutiérrez MD;  Location: [facility] (4TH FLR);  Service: Endoscopy;  Laterality: N/A;    COLONOSCOPY N/A 5/18/2018    Performed by Arden Gutiérrez MD at [facility] (4TH FLR)    COLONOSCOPY N/A 10/24/2016    Performed by Arden Gutiérrez MD at [facility] (4TH FLR)    ESOPHAGOGASTRODUODENOSCOPY  2014    ESOPHAGOGASTRODUODENOSCOPY (EGD) N/A 10/24/2016    Performed by Arden Gutiérrez MD at [facility] (4TH FLR)    HYSTERECTOMY  2014    [facility] for cervical cancer    LYMPHADENECTOMY, RETROPERITONEUM Right 9/17/2018    [facility] 2ND FLR    OOPHORECTOMY      RETROPERITONEAL LYMPHADENECTOMY Right 9/17/2018     Procedure: LYMPHADENECTOMY, RETROPERITONEUM;  Surgeon: Sebas Reed MD;  Location: [facility] OR [address] [place] [address];  Service: General;  Laterality: Right;  ILIAC       Subjective     Chief Complaint: no complaints   Patient/Family Comments/goals: return home    Pain/Comfort:  · Pain Rating 1: 10/10  · Location - Side 1: Bilateral  · Location - Orientation 1: generalized  · Location 1: abdomen  · Pain Addressed 1: Reposition, Distraction  · Pain Rating Post-Intervention 1: (did not rate)    Patients cultural, spiritual, Christianity conflicts given the current situation:      Objective:     Communicated with: RN prior to session.  Patient found call button in reach and UIC and   upon OT entry to room.    General Precautions: Standard, fall   Orthopedic Precautions:N/A   Braces: N/A     Occupational Performance:    Functional Mobility/Transfers:  · Patient completed Sit <> Stand Transfer with stand by assistance  with  no assistive device   · Functional Mobility: Pt ambulated in hallway w/ PT. Refer to PT note for details.     Activities of Daily Living:  · Grooming: stand by assistance oral and facial hygiene   · Upper Body Dressing: minimum assistance donned gown as robe  · Lower Body Dressing: stand by assistance donned/doffed socks seated UIC.     Cognitive/Visual Perceptual:  Cognitive/Psychosocial Skills:     -       Oriented to: Person, Place, Time and Situation   -       Follows Commands/attention:Follows multistep  commands  -       Communication: clear/fluent  -       Memory: No Deficits noted  -       Safety awareness/insight to disability: intact   -       Mood/Affect/Coping skills/emotional control: Appropriate to situation  Visual/Perceptual:      -Intact      Physical Exam:  Balance:    -       Pt displayed fair + to good overall balance   Postural examination/scapula alignment:    -       Rounded shoulders  -       Forward head  Skin integrity: Visible skin intact  Upper Extremity Range of Motion:     -        "Right Upper Extremity: WFL    -       Left Upper Extremity: WFL      Upper Extremity Strength:    -       Right Upper Extremity: WFL  -       Left Upper Extremity: WFL     Strength:    -       Right Upper Extremity: WFL    -       Left Upper Extremity: WFL    Fine Motor Coordination:    -       Intact  Gross motor coordination:   WFL    AMPAC 6 Click ADL:  AMPAC Total Score: 21    Treatment & Education:  Pt educated on POC.  Pt educated on LBD technique of figure 4 position for LBD.   Education:    Patient left up in chair with all lines intact and call button in reach    Assessment:     Edita Riley is a 53 y.o. female with a medical diagnosis of Lymphadenopathy.  She presents with the following performance deficits affecting function: weakness, impaired endurance, impaired self care skills, impaired functional mobilty, gait instability, impaired balance, decreased lower extremity function, pain.      Rehab Prognosis: Good; patient would benefit from acute skilled OT services to address these deficits and reach maximum level of function.         Clinical Decision Makin.  OT Low:  "Pt evaluation falls under low complexity for evaluation coding due to performance deficits noted in 1-3 areas as stated above and 0 co-morbities affecting current functional status. Data obtained from problem focused assessments. No modifications or assistance was required for completion of evaluation. Only brief occupational profile and history review completed."     Plan:     Patient to be seen 2 x/week to address the above listed problems via self-care/home management, therapeutic activities, therapeutic exercises  · Plan of Care Expires: 10/19/18  · Plan of Care Reviewed with: patient    This Plan of care has been discussed with the patient who was involved in its development and understands and is in agreement with the identified goals and treatment plan    GOALS:   Multidisciplinary Problems     Occupational " Therapy Goals        Problem: Occupational Therapy Goal    Goal Priority Disciplines Outcome Interventions   Occupational Therapy Goal     OT, PT/OT     Description:  Goals to be met by: 9/26/2018     Patient will increase functional independence with ADLs by performing:    UE Dressing with [place].  LE Dressing with [place].  Grooming while seated at sink with [place].  Toileting from toilet with [place] for hygiene and clothing management.   Toilet transfer to toilet with [place].                      Time Tracking:     OT Date of Treatment: 09/19/18  OT Start Time: 0937  OT Stop Time: 0953  OT Total Time (min): 16 min    Billable Minutes:Evaluation 8 minutes  Self Care/Home Management 8 minutes    Oziel Raya, OT  9/19/2018

## 2018-09-19 NOTE — TELEPHONE ENCOUNTER
Spoke with pt's  regarding post op appt. Appt scheduled 10/4/18 at 10:15 am and reminder slip mailed to pt's home.

## 2018-09-19 NOTE — SUBJECTIVE & OBJECTIVE
Interval History:   Patient seen and examined, no acute events overnight  Denies N/V  +F/BM  Abdominal pain improved  Mild tachycardia, afebrile    Medications:  Continuous Infusions:   sodium chloride 0.9% 75 mL/hr at 09/18/18 1843     Scheduled Meds:   DULoxetine  60 mg Oral Daily    enoxaparin  40 mg Subcutaneous Daily    gabapentin  600 mg Oral BID    pantoprazole  40 mg Oral Daily    polyethylene glycol  17 g Oral Daily    sucralfate  1 g Oral BID    traZODone  50 mg Oral QHS     PRN Meds:diphenhydrAMINE, naloxone, ondansetron, oxyCODONE-acetaminophen, oxyCODONE-acetaminophen, promethazine (PHENERGAN) IVPB     Review of patient's allergies indicates:   Allergen Reactions    Bee sting [allergen ext-venom-honey bee]      Rash      Grass pollen-bermuda, standard      rash     Objective:     Vital Signs (Most Recent):  Temp: 96.6 °F (35.9 °C) (09/19/18 0437)  Pulse: 97 (09/19/18 0437)  Resp: 18 (09/19/18 0437)  BP: 138/82 (09/19/18 0437)  SpO2: 100 % (09/19/18 0437) Vital Signs (24h Range):  Temp:  [96.6 °F (35.9 °C)-98.7 °F (37.1 °C)] 96.6 °F (35.9 °C)  Pulse:  [] 97  Resp:  [18] 18  SpO2:  [97 %-100 %] 100 %  BP: (128-139)/(62-82) 138/82     Weight: 114 kg (251 lb 5.2 oz)  Body mass index is 37.11 kg/m².    Intake/Output - Last 3 Shifts       09/17 0700 - 09/18 0659 09/18 0700 - 09/19 0659    P.O. 420 1420    I.V. (mL/kg) 1990 (17.5) 2235 (19.6)    Total Intake(mL/kg) 2410 (21.1) 3655 (32.1)    Urine (mL/kg/hr) 1575 2300 (0.8)    Total Output 1575 2300    Net +835 +1355          Urine Occurrence  1 x          Physical Exam   Constitutional: She appears well-developed and well-nourished. No distress.   HENT:   Head: Normocephalic and atraumatic.   Cardiovascular: Normal rate and regular rhythm.   Pulmonary/Chest: Effort normal. No respiratory distress.   Abdominal:   Soft, appropriate TTP  Surgical dressing in place - clean, dry and intact       Significant Labs:  CBC:   Recent Labs   Lab   09/19/18   0453   WBC  13.31*   RBC  4.29   HGB  12.2   HCT  39.1   PLT  260   MCV  91   MCH  28.4   MCHC  31.2*     BMP:   Recent Labs   Lab  09/18/18   0526   GLU  128*   NA  138   K  4.2   CL  103   CO2  26   BUN  8   CREATININE  0.8   CALCIUM  9.0   MG  2.0     CMP:   Recent Labs   Lab  09/18/18   0526   GLU  128*   CALCIUM  9.0   NA  138   K  4.2   CO2  26   CL  103   BUN  8   CREATININE  0.8

## 2018-09-19 NOTE — PLAN OF CARE
Problem: Occupational Therapy Goal  Goal: Occupational Therapy Goal  Goals to be met by: 9/26/2018     Patient will increase functional independence with ADLs by performing:    UE Dressing with [place].  LE Dressing with [place].  Grooming while seated at sink with [place].  Toileting from toilet with [place] for hygiene and clothing management.   Toilet transfer to toilet with [place].    Initiate OT POC    Comments: Oziel Raya OTR/L  9/19/2018

## 2018-09-19 NOTE — TELEPHONE ENCOUNTER
----- Message from Chiara Baeza sent at 9/19/2018  2:54 PM CDT -----  Contact: Inna /  Caller is calling to book an appt for the pt , it's suppose to be a follow up appt for 2 weeks , which is 10/01/18 , please call the pt at.[phone]

## 2018-09-19 NOTE — PT/OT/SLP EVAL
"Physical Therapy Evaluation    Patient Name:  Edita Riley   MRN:  [id number]    Recommendations:     Discharge Recommendations:  home health PT   Discharge Equipment Recommendations: none   Barriers to discharge: Inaccessible home 3 MANAV with L UE rail    Assessment:     Edita Riley is a 53 y.o. female admitted with a medical diagnosis of Lymphadenopathy.  She presents with the following impairments/functional limitations:  weakness, impaired endurance, gait instability, impaired functional mobilty, pain . Pt is motivated to progress with mobility.    Rehab Prognosis:  good; patient would benefit from acute skilled PT services to address these deficits and reach maximum level of function.      Recent Surgery: Procedure(s) (LRB):  LYMPHADENECTOMY, RETROPERITONEUM (Right) 2 Days Post-Op    Plan:     During this hospitalization, patient to be seen 3 x/week to address the above listed problems via therapeutic activities, gait training, therapeutic exercises, neuromuscular re-education  · Plan of Care Expires:  10/19/18   Plan of Care Reviewed with: patient    Subjective     Communicated with nurse prior to session.  Patient found sittingup  In bedside chair upon PT entry to room, agreeable to evaluation.    "Am I doing OK?"    Pain/Comfort:  · Pain Rating 1: 9/10  · Location - Orientation 1: generalized  · Location 1: abdomen  · Pain Addressed 1: Reposition, Distraction  · Pain Rating Post-Intervention 1: 9/10    Patients cultural, spiritual, [employer] conflicts given the current situation: none noted    Living Environment:  Pt lives with  and 18 yr old daughter in a 1 story home with 3 MANAV with L HR.  Prior to admission, patients level of function was independent.  Patient has the following equipment: none.   Upon discharge, patient will have assistance from .    Objective:     Patient found with: (none)     General Precautions: Standard, fall   Orthopedic Precautions:N/A "   Braces: N/A     Exams:  · Cognitive Exam:  Patient is oriented to Person, Place, Time and Situation  · Postural Exam:  Patient presented with the following abnormalities:    · -       Rounded shoulders  · -       Forward head  · Sensation:    · -       Intact  light/touch B LE  · RLE ROM: WFL  · RLE Strength: WFL except hip flex at least 3/5  · LLE ROM: WFL  · LLE Strength: WFL except hip flex at least 3/5    Functional Mobility:  · Transfers:     · Sit to Stand:  contact guard assistance with no AD  · Gait: 100ft with HHA at first then with no AD with CGA. pt peformed gait with flexed trunk and at slow pace.    AM-PAC 6 CLICK MOBILITY  Total Score:18     Patient left up in chair with all lines intact, call button in reach and nurse notified.    GOALS:   Multidisciplinary Problems     Physical Therapy Goals        Problem: Physical Therapy Goal    Goal Priority Disciplines Outcome Goal Variances Interventions   Physical Therapy Goal     PT, PT/OT Ongoing (interventions implemented as appropriate)     Description:  PT goals until 9/25/18    1. Pt supine to sit with supervision-not met  2. Pt sit to supine with supervision-not met  3. Pt sit to stand with no AD with supervision-not met  4. Pt to perform gait 250ft with no AD with supervision.-not met  5. Pt to up/down 3 steps with L UE rail with SBA.-not met                      History:     Past Medical History:   Diagnosis Date    Cervical cancer 2014    Chronic back pain     Fibromyalgia     GERD (gastroesophageal reflux disease)     Hiatal hernia 2014    Hypertension     Lactose intolerance     Osteoarthritis of back     Stroke 1972       Past Surgical History:   Procedure Laterality Date    BILATERAL OOPHORECTOMY  2015    CHOLECYSTECTOMY  11/09/2016    Done at [facility] showed chronic cholecystitis and gallstones    CHOLECYSTECTOMY-LAPAROSCOPIC N/A 11/9/2016    Performed by Joshua Goldberg[facility] at [facility] OR [address] [place] [address]    cold knife conization  2014     COLONOSCOPY  2014    COLONOSCOPY N/A 10/24/2016    at [facility], Dr Thomas    COLONOSCOPY N/A 5/18/2018    Procedure: COLONOSCOPY;  Surgeon: Arden Gutiérrez MD;  Location: [facility] (4TH FLR);  Service: Endoscopy;  Laterality: N/A;    COLONOSCOPY N/A 5/18/2018    Performed by Arden Gutiérrez MD at [facility] ENDO (4TH FLR)    COLONOSCOPY N/A 10/24/2016    Performed by Arden Gutiérrez MD at [facility] ENDO (4TH FLR)    ESOPHAGOGASTRODUODENOSCOPY  2014    ESOPHAGOGASTRODUODENOSCOPY (EGD) N/A 10/24/2016    Performed by Arden Gutiérrez MD at [facility] ENDO (4TH FLR)    HYSTERECTOMY  2014    H for cervical cancer    LYMPHADENECTOMY, RETROPERITONEUM Right 9/17/2018    Performed by Sebas Reed MD at [facility] OR 2ND FLR    OOPHORECTOMY      RETROPERITONEAL LYMPHADENECTOMY Right 9/17/2018    Procedure: LYMPHADENECTOMY, RETROPERITONEUM;  Surgeon: Sebas Reed MD;  Location: [facility] OR [address] [place] [address];  Service: General;  Laterality: Right;  ILIAC       Clinical Decision Making:     History  Co-morbidities and personal factors that may impact the plan of care Examination  Body Structures and Functions, activity limitations and participation restrictions that may impact the plan of care Clinical Presentation   Decision Making/ Complexity Score   Co-morbidities:   [] Time since onset of injury / illness / exacerbation  [] Status of current condition  []Patient's cognitive status and safety concerns    [] Multiple Medical Problems (see med hx)  Personal Factors:   [] Patient's age  [] Prior Level of function   [x] Patient's home situation (environment and family support)  [] Patient's level of motivation  [] Expected progression of patient      HISTORY:(criteria)    [] 54474 - no personal factors/history    [x] 05921 - has 1-2 personal factor/comorbidity     [] 87267 - has >3 personal factor/comorbidity     Body Regions:  [] Objective examination findings  [] Head     []  Neck  [] Trunk   [] Upper Extremity  [] Lower  Extremity    Body Systems:  [] For communication ability, affect, cognition, language, and learning style: the assessment of the ability to make needs known, consciousness, orientation (person, place, and time), expected emotional /behavioral responses, and learning preferences (eg, learning barriers, education  needs)  [x] For the neuromuscular system: a general assessment of gross coordinated movement (eg, balance, gait, locomotion, transfers, and transitions) and motor function  (motor control and motor learning)  [] For the musculoskeletal system: the assessment of gross symmetry, gross range of motion, gross strength, height, and weight  [] For the integumentary system: the assessment of pliability(texture), presence of scar formation, skin color, and skin integrity  [] For cardiovascular/pulmonary system: the assessment of heart rate, respiratory rate, blood pressure, and edema     Activity limitations:    [] Patient's cognitive status and saf ety concerns          [] Status of current condition      [] Weight bearing restriction  [] Cardiopulmunary Restriction    Participation Restrictions:   [] Goals and goal agreement with the patient     [] Rehab potential (prognosis) and probable outcome      Examination of Body System: (criteria)    [x] 96561 - addressing 1-2 elements    [] 37615 - addressing a total of 3 or more elements     [] 97099 -  Addressing a total of 4 or more elements         Clinical Presentation: (criteria)  Stable - 69630     On examination of body system using standardized tests and measures patient presents with (CHOOSE ONE) elements from any of the following: body structures and functions, activity limitations, and/or participation restrictions.  Leading to a clinical presentation that is considered (CHOOSE ONE)                              Clinical Decision Making  (Eval Complexity):  Low- 58115     Time Tracking:     PT Received On: 09/19/18  PT Start Time: 0932     PT Stop Time:  0959  PT Total Time (min): 27 min     Billable Minutes: Evaluation 27      Chana Sharp, PT  09/19/2018

## 2018-09-19 NOTE — PLAN OF CARE
Spoke to patient & spouse regarding PT/OT recs: HH PT/OT. Informed them her insurance, being a Medicaid insurer, does not have HH PT/OT as a benefit, but they do have OP PT where the  can order OP PT, CM would give them the order, and they would have to call to make arrangements near their home;Family will need to  drive her there 2-3 times/week. Asked them if patient would like this? They verbalized their understanding. Patient deferred to her spouse. Spouse wants patient to get OP PT.

## 2018-09-19 NOTE — PLAN OF CARE
Problem: Physical Therapy Goal  Goal: Physical Therapy Goal  PT goals until 9/25/18    1. Pt supine to sit with supervision-not met  2. Pt sit to supine with supervision-not met  3. Pt sit to stand with no AD with supervision-not met  4. Pt to perform gait 250ft with no AD with supervision.-not met  5. Pt to up/down 3 steps with L UE rail with SBA.-not met    Outcome: Ongoing (interventions implemented as appropriate)  Pt's goals set and pt will benefit from skilled PT services to work towards improved functional mobility including: bed mobility, transfers, up/down steps, and gait.   Chana Sharp, PT  9/19/2018

## 2018-09-19 NOTE — PLAN OF CARE
Problem: Mobility, Physical Impaired (Adult)  Intervention: Monitor/Assist with Self Care  Pt ambulating in fonseca and to bathroom with assitance x1. Tolerating well.

## 2018-09-19 NOTE — PROGRESS NOTES
[facility]  General Surgery  Progress Note    Subjective:     Post-Op Info:  Procedure(s) (LRB):  LYMPHADENECTOMY, RETROPERITONEUM (Right)   2 Days Post-Op     Interval History:   Patient seen and examined, no acute events overnight  Denies N/V  +F/BM  Abdominal pain improved  Mild tachycardia, afebrile    Medications:  Continuous Infusions:   sodium chloride 0.9% 75 mL/hr at 09/18/18 1843     Scheduled Meds:   DULoxetine  60 mg Oral Daily    enoxaparin  40 mg Subcutaneous Daily    gabapentin  600 mg Oral BID    pantoprazole  40 mg Oral Daily    polyethylene glycol  17 g Oral Daily    sucralfate  1 g Oral BID    traZODone  50 mg Oral QHS     PRN Meds:diphenhydrAMINE, naloxone, ondansetron, oxyCODONE-acetaminophen, oxyCODONE-acetaminophen, promethazine (PHENERGAN) IVPB     Review of patient's allergies indicates:   Allergen Reactions    Bee sting [allergen ext-venom-honey bee]      Rash      Grass pollen-bermuda, standard      rash     Objective:     Vital Signs (Most Recent):  Temp: 96.6 °F (35.9 °C) (09/19/18 0437)  Pulse: 97 (09/19/18 0437)  Resp: 18 (09/19/18 0437)  BP: 138/82 (09/19/18 0437)  SpO2: 100 % (09/19/18 0437) Vital Signs (24h Range):  Temp:  [96.6 °F (35.9 °C)-98.7 °F (37.1 °C)] 96.6 °F (35.9 °C)  Pulse:  [] 97  Resp:  [18] 18  SpO2:  [97 %-100 %] 100 %  BP: (128-139)/(62-82) 138/82     Weight: 114 kg (251 lb 5.2 oz)  Body mass index is 37.11 kg/m².    Intake/Output - Last 3 Shifts       09/17 0700 - 09/18 0659 09/18 0700 - 09/19 0659    P.O. 420 1420    I.V. (mL/kg) 1990 (17.5) 2235 (19.6)    Total Intake(mL/kg) 2410 (21.1) 3655 (32.1)    Urine (mL/kg/hr) 1575 2300 (0.8)    Total Output 1575 2300    Net +835 +1355          Urine Occurrence  1 x          Physical Exam   Constitutional: She appears well-developed and well-nourished. No distress.   HENT:   Head: Normocephalic and atraumatic.   Cardiovascular: Normal rate and regular rhythm.   Pulmonary/Chest: Effort  normal. No respiratory distress.   Abdominal:   Soft, appropriate TTP  Surgical dressing in place - clean, dry and intact       Significant Labs:  CBC:   Recent Labs   Lab  09/19/18   0453   WBC  13.31*   RBC  4.29   HGB  12.2   HCT  39.1   PLT  260   MCV  91   MCH  28.4   MCHC  31.2*     BMP:   Recent Labs   Lab  09/18/18   0526   GLU  128*   NA  138   K  4.2   CL  103   CO2  26   BUN  8   CREATININE  0.8   CALCIUM  9.0   MG  2.0     CMP:   Recent Labs   Lab  09/18/18   0526   GLU  128*   CALCIUM  9.0   NA  138   K  4.2   CO2  26   CL  103   BUN  8   CREATININE  0.8     Assessment/Plan:     * Lymphadenopathy    52 yo female s/p retroperitoneal lymph node excision    -regular diet  -d/c IVF  -pain/nausea meds PRN  -bowel regimen  -PT/OT  -DVT/GI prophylaxis  -stable for discharge         Sleep stage dysfunction    Home meds  -trazadone        Fibromyalgia    Home meds  -gabapentin        Depression    Home meds  -cymbalta            Anel Alcaraz PA-C   [id number]  General Surgery  [facility]

## 2018-09-19 NOTE — ASSESSMENT & PLAN NOTE
54 yo female s/p retroperitoneal lymph node excision    -regular diet  -d/c IVF  -pain/nausea meds PRN  -bowel regimen  -PT/OT  -DVT/GI prophylaxis  -stable for discharge

## 2018-09-20 VITALS
SYSTOLIC BLOOD PRESSURE: 137 MMHG | WEIGHT: 251.31 LBS | TEMPERATURE: 98 F | HEIGHT: 69 IN | DIASTOLIC BLOOD PRESSURE: 71 MMHG | RESPIRATION RATE: 16 BRPM | BODY MASS INDEX: 37.22 KG/M2 | HEART RATE: 106 BPM | OXYGEN SATURATION: 95 %

## 2018-09-20 LAB
ANION GAP SERPL CALC-SCNC: 7 MMOL/L
BASOPHILS # BLD AUTO: 0.04 K/UL
BASOPHILS NFR BLD: 0.4 %
BUN SERPL-MCNC: 10 MG/DL
CALCIUM SERPL-MCNC: 9.1 MG/DL
CHLORIDE SERPL-SCNC: 104 MMOL/L
CO2 SERPL-SCNC: 28 MMOL/L
CREAT SERPL-MCNC: 0.8 MG/DL
DIFFERENTIAL METHOD: ABNORMAL
EOSINOPHIL # BLD AUTO: 0.2 K/UL
EOSINOPHIL NFR BLD: 2 %
ERYTHROCYTE [DISTWIDTH] IN BLOOD BY AUTOMATED COUNT: 14.6 %
EST. GFR  (AFRICAN AMERICAN): >60 ML/MIN/1.73 M^2
EST. GFR  (NON AFRICAN AMERICAN): >60 ML/MIN/1.73 M^2
GLUCOSE SERPL-MCNC: 131 MG/DL
HCT VFR BLD AUTO: 36.9 %
HGB BLD-MCNC: 11.7 G/DL
IMM GRANULOCYTES # BLD AUTO: 0.05 K/UL
IMM GRANULOCYTES NFR BLD AUTO: 0.5 %
LYMPHOCYTES # BLD AUTO: 2.2 K/UL
LYMPHOCYTES NFR BLD: 20 %
MAGNESIUM SERPL-MCNC: 2 MG/DL
MCH RBC QN AUTO: 28.8 PG
MCHC RBC AUTO-ENTMCNC: 31.7 G/DL
MCV RBC AUTO: 91 FL
MONOCYTES # BLD AUTO: 1.3 K/UL
MONOCYTES NFR BLD: 12.2 %
NEUTROPHILS # BLD AUTO: 7.1 K/UL
NEUTROPHILS NFR BLD: 64.9 %
NRBC BLD-RTO: 0 /100 WBC
PHOSPHATE SERPL-MCNC: 2.9 MG/DL
PLATELET # BLD AUTO: 253 K/UL
PMV BLD AUTO: 10.6 FL
POTASSIUM SERPL-SCNC: 4 MMOL/L
RBC # BLD AUTO: 4.06 M/UL
SODIUM SERPL-SCNC: 139 MMOL/L
WBC # BLD AUTO: 10.94 K/UL

## 2018-09-20 PROCEDURE — 25000003 PHARM REV CODE 250: Performed by: SURGERY

## 2018-09-20 PROCEDURE — 83735 ASSAY OF MAGNESIUM: CPT

## 2018-09-20 PROCEDURE — 80048 BASIC METABOLIC PNL TOTAL CA: CPT

## 2018-09-20 PROCEDURE — 63600175 PHARM REV CODE 636 W HCPCS: Performed by: STUDENT IN AN ORGANIZED HEALTH CARE EDUCATION/TRAINING PROGRAM

## 2018-09-20 PROCEDURE — 84100 ASSAY OF PHOSPHORUS: CPT

## 2018-09-20 PROCEDURE — 85025 COMPLETE CBC W/AUTO DIFF WBC: CPT

## 2018-09-20 PROCEDURE — 36415 COLL VENOUS BLD VENIPUNCTURE: CPT

## 2018-09-20 RX ORDER — OXYCODONE AND ACETAMINOPHEN 5; 325 MG/1; MG/1
1 TABLET ORAL EVERY 4 HOURS PRN
Qty: 28 TABLET | Refills: 0 | Status: SHIPPED | OUTPATIENT
Start: 2018-09-20 | End: 2020-03-12

## 2018-09-20 RX ADMIN — POLYETHYLENE GLYCOL 3350 17 G: 17 POWDER, FOR SOLUTION ORAL at 09:09

## 2018-09-20 RX ADMIN — SUCRALFATE 1 G: 1 TABLET ORAL at 09:09

## 2018-09-20 RX ADMIN — OXYCODONE HYDROCHLORIDE AND ACETAMINOPHEN 1 TABLET: 5; 325 TABLET ORAL at 04:09

## 2018-09-20 RX ADMIN — KETOROLAC TROMETHAMINE 30 MG: 30 INJECTION, SOLUTION INTRAMUSCULAR at 05:09

## 2018-09-20 RX ADMIN — DULOXETINE 60 MG: 60 CAPSULE, DELAYED RELEASE ORAL at 09:09

## 2018-09-20 RX ADMIN — PANTOPRAZOLE SODIUM 40 MG: 40 TABLET, DELAYED RELEASE ORAL at 09:09

## 2018-09-20 RX ADMIN — GABAPENTIN 600 MG: 300 CAPSULE ORAL at 09:09

## 2018-09-20 NOTE — SUBJECTIVE & OBJECTIVE
Interval History:   Patient seen and examined, no acute events overnight  Denies N/V  +F/BM  Abdominal pain improved  Mild tachycardia, afebrile    Medications:  Continuous Infusions:    Scheduled Meds:   DULoxetine  60 mg Oral Daily    enoxaparin  40 mg Subcutaneous Daily    gabapentin  600 mg Oral BID    ketorolac  30 mg Intravenous Q8H    pantoprazole  40 mg Oral Daily    polyethylene glycol  17 g Oral Daily    sucralfate  1 g Oral BID    traZODone  50 mg Oral QHS     PRN Meds:diphenhydrAMINE, naloxone, ondansetron, oxyCODONE-acetaminophen, oxyCODONE-acetaminophen, promethazine (PHENERGAN) IVPB     Review of patient's allergies indicates:   Allergen Reactions    Bee sting [allergen ext-venom-honey bee]      Rash      Grass pollen-bermuda, standard      rash     Objective:     Vital Signs (Most Recent):  Temp: 97.5 °F (36.4 °C) (09/20/18 0559)  Pulse: 103 (09/20/18 0559)  Resp: 18 (09/20/18 0559)  BP: 137/80 (09/20/18 0559)  SpO2: (!) 93 % (09/20/18 0559) Vital Signs (24h Range):  Temp:  [97.1 °F (36.2 °C)-99.5 °F (37.5 °C)] 97.5 °F (36.4 °C)  Pulse:  [] 103  Resp:  [16-18] 18  SpO2:  [93 %-96 %] 93 %  BP: (137-173)/(71-89) 137/80     Weight: 114 kg (251 lb 5.2 oz)  Body mass index is 37.11 kg/m².    Intake/Output - Last 3 Shifts       09/18 0700 - 09/19 0659 09/19 0700 - 09/20 0659 09/20 0700 - 09/21 0659    P.O. 1420 1000     I.V. (mL/kg) 2235 (19.6)      Total Intake(mL/kg) 3655 (32.1) 1000 (8.8)     Urine (mL/kg/hr) 2300 (0.8)      Total Output 2300      Net +1355 +1000            Urine Occurrence 1 x 4 x           Physical Exam   Constitutional: She appears well-developed and well-nourished. No distress.   HENT:   Head: Normocephalic and atraumatic.   Cardiovascular: Normal rate and regular rhythm.   Pulmonary/Chest: Effort normal. No respiratory distress.   Abdominal:   Soft, appropriate TTP  Incision with staples- clean, dry and intact       Significant Labs:  CBC:   Recent Labs   Lab   09/20/18 0452   WBC  10.94   RBC  4.06   HGB  11.7*   HCT  36.9*   PLT  253   MCV  91   MCH  28.8   MCHC  31.7*     BMP:   Recent Labs   Lab  09/20/18 0452   GLU  131*   NA  139   K  4.0   CL  104   CO2  28   BUN  10   CREATININE  0.8   CALCIUM  9.1   MG  2.0     CMP:   Recent Labs   Lab  09/20/18 0452   GLU  131*   CALCIUM  9.1   NA  139   K  4.0   CO2  28   CL  104   BUN  10   CREATININE  0.8

## 2018-09-20 NOTE — PLAN OF CARE
09/20/18 1000   Final Note   Assessment Type Final Discharge Note   Discharge Disposition Home  (OP PT)   What phone number can be called within the next 1-3 days to see how you are doing after discharge? ([phone])   Hospital Follow Up  Appt(s) scheduled? Yes   Discharge plans and expectations educations in teach back method with documentation complete? Yes   Right Care Referral Info   Post Acute Recommendation (Incomplete)

## 2018-09-20 NOTE — PROGRESS NOTES
[facility]  General Surgery  Progress Note    Subjective:     History of Present Illness:  No notes on file    Post-Op Info:  Procedure(s) (LRB):  LYMPHADENECTOMY, RETROPERITONEUM (Right)   3 Days Post-Op     Interval History:   Patient seen and examined, no acute events overnight  Denies N/V  +F/BM  Abdominal pain improved  Mild tachycardia, afebrile    Medications:  Continuous Infusions:    Scheduled Meds:   DULoxetine  60 mg Oral Daily    enoxaparin  40 mg Subcutaneous Daily    gabapentin  600 mg Oral BID    ketorolac  30 mg Intravenous Q8H    pantoprazole  40 mg Oral Daily    polyethylene glycol  17 g Oral Daily    sucralfate  1 g Oral BID    traZODone  50 mg Oral QHS     PRN Meds:diphenhydrAMINE, naloxone, ondansetron, oxyCODONE-acetaminophen, oxyCODONE-acetaminophen, promethazine (PHENERGAN) IVPB     Review of patient's allergies indicates:   Allergen Reactions    Bee sting [allergen ext-venom-honey bee]      Rash      Grass pollen-bermuda, standard      rash     Objective:     Vital Signs (Most Recent):  Temp: 97.5 °F (36.4 °C) (09/20/18 0559)  Pulse: 103 (09/20/18 0559)  Resp: 18 (09/20/18 0559)  BP: 137/80 (09/20/18 0559)  SpO2: (!) 93 % (09/20/18 0559) Vital Signs (24h Range):  Temp:  [97.1 °F (36.2 °C)-99.5 °F (37.5 °C)] 97.5 °F (36.4 °C)  Pulse:  [] 103  Resp:  [16-18] 18  SpO2:  [93 %-96 %] 93 %  BP: (137-173)/(71-89) 137/80     Weight: 114 kg (251 lb 5.2 oz)  Body mass index is 37.11 kg/m².    Intake/Output - Last 3 Shifts       09/18 0700 - 09/19 0659 09/19 0700 - 09/20 0659 09/20 0700 - 09/21 0659    P.O. 1420 1000     I.V. (mL/kg) 2235 (19.6)      Total Intake(mL/kg) 3655 (32.1) 1000 (8.8)     Urine (mL/kg/hr) 2300 (0.8)      Total Output 2300      Net +1355 +1000            Urine Occurrence 1 x 4 x           Physical Exam   Constitutional: She appears well-developed and well-nourished. No distress.   HENT:   Head: Normocephalic and atraumatic.   Cardiovascular:  Normal rate and regular rhythm.   Pulmonary/Chest: Effort normal. No respiratory distress.   Abdominal:   Soft, appropriate TTP  Incision with staples- clean, dry and intact       Significant Labs:  CBC:   Recent Labs   Lab  09/20/18   0452   WBC  10.94   RBC  4.06   HGB  11.7*   HCT  36.9*   PLT  253   MCV  91   MCH  28.8   MCHC  31.7*     BMP:   Recent Labs   Lab  09/20/18   0452   GLU  131*   NA  139   K  4.0   CL  104   CO2  28   BUN  10   CREATININE  0.8   CALCIUM  9.1   MG  2.0     CMP:   Recent Labs   Lab  09/20/18   0452   GLU  131*   CALCIUM  9.1   NA  139   K  4.0   CO2  28   CL  104   BUN  10   CREATININE  0.8     Assessment/Plan:     * Lymphadenopathy    54 yo female s/p retroperitoneal lymph node excision POD#2    -regular diet  -pain/nausea meds PRN  -bowel regimen  -PT/OT  -DVT/GI prophylaxis  -stable for discharge after breakfast        Sleep stage dysfunction    Home meds  -trazadone        Fibromyalgia    Home meds  -gabapentin        Depression    Home meds  -cymbalta            Megha Joyner MD  General Surgery  [facility]

## 2018-09-20 NOTE — PROGRESS NOTES
Patient ambulated down the hallway with standby assistance.  Patient does not complain of any pain.  Will continue to monitor.

## 2018-09-20 NOTE — DISCHARGE SUMMARY
[facility]  General Surgery  Discharge Summary      Patient Name: Edita Riley  MRN: [id number]  Admission Date: 9/17/2018  Hospital Length of Stay: 3 days  Discharge Date and Time:  09/20/2018 7:49 AM  Attending Physician: Jose Antonio Reed MD   Discharging Provider: Megha Joyner MD  Primary Care Provider: Hammad Lackey MD     HPI: Patient is a 53 y.o. female presents with chronic abdominal pain referred for consideration of biopsy of a 2 cm iliac artery bifurcation node  Node was evident on scan in 4/18 and again in 7/18 - no change  Patient's symptoms are non specific, diffuse, have not resulted in weight loss  Patient already carries a diagnosis of diffuse pain syndromes including fibromyalgia        Procedure(s) (LRB):  LYMPHADENECTOMY, RETROPERITONEUM (Right)     Hospital Course: The patient tolerated the procedure well. Her diet was advanced to regular. Her pain was controlled and was close to her baseline level of abdominal pain. She ambulated. She had return of bowel function. She was discharged to home on POD3.     Consults:   Consults (From admission, onward)        Status Ordering Provider     IP consult to case management  Once     Provider:  (Not yet assigned)    Acknowledged JOSE ANTONIO REED          Significant Diagnostic Studies: Labs:   CBC   Recent Labs   Lab  09/19/18   0453  09/20/18   0452   WBC  13.31*  10.94   HGB  12.2  11.7*   HCT  39.1  36.9*   PLT  260  253       Pending Diagnostic Studies:     None        Final Active Diagnoses:    Diagnosis Date Noted POA    PRINCIPAL PROBLEM:  Lymphadenopathy [R59.1] 09/17/2018 Yes    Sleep stage dysfunction [G47.20]  Yes    Depression [F32.9] 09/16/2015 Yes    Fibromyalgia [M79.7] 09/16/2015 Yes      Problems Resolved During this Admission:      Discharged Condition: stable    Disposition: Home or Self Care    Follow Up:  Follow-up Information     Jose Antonio Reed MD In 2 weeks.    Specialties:  General  Surgery, Surgery  Why:  Post-op Appt: Office to schedule & call you w/Appt.   Contact information:  [address]  [facility] [address]  [phone]             Order given to patient for Ambulatory Referral to Physical/Occupational Therapy..    Why:  Patient & spouse instructed they need to arrange. List of local Outpatient Physical Therapy companies given.(PT/OT recs: Home Health PT/OT. Informed pt & spouse patients' insurance, being a Medicaid insurer, does not have HH PT/OT as a benefit.)                Patient Instructions:      Ambulatory Referral to Physical/Occupational Therapy   Referral Priority: Routine Referral Type: Physical Medicine   Referral Reason: Specialty Services Required   Number of Visits Requested: 1     Diet Adult Regular     Other restrictions (specify):   Order Comments: No driving while still taking pain medications daily.   Take a stool softener while taking pain medications daily.   No lifting more than 10 lbs for 6 weeks.   Ok to shower     Notify your health care provider if you experience any of the following:  temperature >100.4     Notify your health care provider if you experience any of the following:  persistent nausea and vomiting or diarrhea     Notify your health care provider if you experience any of the following:  severe uncontrolled pain     Notify your health care provider if you experience any of the following:  redness, tenderness, or signs of infection (pain, swelling, redness, odor or green/yellow discharge around incision site)     Notify your health care provider if you experience any of the following:  worsening rash     Notify your health care provider if you experience any of the following:  increased confusion or weakness     Notify your health care provider if you experience any of the following:  persistent dizziness, light-headedness, or visual disturbances     No dressing needed     Medications:  Reconciled Home Medications:      Medication List       START taking these medications    oxyCODONE-acetaminophen 5-325 mg per tablet  Commonly known as:  PERCOCET  Take 1 tablet by mouth every 4 (four) hours as needed.        CONTINUE taking these medications    arm brace Misc  Commonly known as:  NEOPRENE WRIST SPLINT SUPPORT  1 Units by Misc.(Non-Drug; Combo Route) route every evening.     cholecalciferol (vitamin D3) 2,000 unit Cap  Commonly known as:  VITAMIN D3  Take 1 capsule by mouth once daily.     conjugated estrogens vaginal cream  Commonly known as:  PREMARIN  Place 0.5 g vaginally twice a week.     cyclobenzaprine 10 MG tablet  Commonly known as:  FLEXERIL  TAKE 1 TABLET (10 MG TOTAL) BY MOUTH NIGHTLY AS NEEDED FOR MUSCLE SPASMS.     DULoxetine 60 MG capsule  Commonly known as:  CYMBALTA  TAKE 1 CAPSULE (60 MG TOTAL) BY MOUTH ONCE DAILY.     estradiol 1 MG tablet  Commonly known as:  ESTRACE  Take 1 mg by mouth once daily.     gabapentin 300 MG capsule  Commonly known as:  NEURONTIN  Take 2 capsules (600 mg total) by mouth 2 (two) times daily.     lisinopril 10 MG tablet  TAKE 1 TABLET (10 MG TOTAL) BY MOUTH ONCE DAILY.     meclizine 25 mg tablet  Commonly known as:  ANTIVERT  Take 1 tablet (25 mg total) by mouth 3 (three) times daily as needed for Dizziness.     omeprazole 40 MG capsule  Commonly known as:  PRILOSEC  Take 1 capsule (40 mg total) by mouth once daily.     sucralfate 1 gram tablet  Commonly known as:  CARAFATE  Take 1 tablet (1 g total) by mouth 2 (two) times daily.     traMADol 50 mg tablet  Commonly known as:  ULTRAM  Take 50 mg by mouth every 6 (six) hours as needed for Pain.     traZODone 50 MG tablet  Commonly known as:  DESYREL  Take 1 tablet (50 mg total) by mouth every evening.            Megha Joyner MD  General Surgery  [facility]

## 2018-09-20 NOTE — ASSESSMENT & PLAN NOTE
52 yo female s/p retroperitoneal lymph node excision POD#2    -regular diet  -pain/nausea meds PRN  -bowel regimen  -PT/OT  -DVT/GI prophylaxis  -stable for discharge after breakfast

## 2018-09-24 NOTE — PHYSICIAN QUERY
PT Name: Edita Riley  MR #: [id number]     Physician Query Form - Documentation Clarification      CDS/: Brandy E Capley               Contact information:  Spectralink:  [phone]    This form is a permanent document in the medical record.     Query Date: September 24, 2018    By submitting this query, we are merely seeking further clarification of documentation. Please utilize your independent clinical judgment when addressing the question(s) below.    The Medical record reflects the following:    Supporting Clinical Findings Location in Medical Record     Phosphorus: 2.3 (L)     Labs 9/19/2018 04:53     potassium, sodium phosphates 280-160-250 mg packet 2 packetDose: 2 packet : Oral : Before meals & nightly      MAR 9/19 1309                                                                            Doctor, Please specify diagnosis or diagnoses associated with above clinical findings.    Provider Use Only       ( x )  Hypophosphatemia     (  )  Other (please specify):  ________________________                                                                                                             [  ] Clinically undetermined

## 2018-09-24 NOTE — PHYSICIAN QUERY
PT Name: Edita Riley  MR #: [id number]    Physician Query Form - Pathology Findings Clarification     CDS/: Brandy E Capley               Contact information:  Spectralink:  [phone]  This form is a permanent document in the medical record.     Query Date: September 24, 2018      By submitting this query, we are merely seeking further clarification of documentation.  Please utilize your independent clinical judgment when addressing the question(s) below.      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 THE RESULTS OF  ADDITIONAL IMMUNOSTAINS ARE AS FOLLOWS: CK5/6, P63 AND CK7 ARE POSITIVE. TTF, S100 AND  CK20 ARE ALL NEGATIVE. THE CONTROLS STAIN APPROPRIATELY.  NOTE: GIVEN THESE RESULTS THIS MAY REPRESENT A PRIMARY IN THE URINARY OR LOWER GYN  TRACTS. DR. JAMESON HAS ALSO REVIEWED THIS CASE AND CONCURS WITH THE DIAGNOSIS.  CD20    FINAL PATHOLOGIC DIAGNOSIS  RIGHT ILIAC LYMPH NODE SHOWING METASTATIC CARCINOMA. PANCYTOKERATIN STAIN IS STRONGLY  POSITIVE. S100, CD3, CD 20 AND CD 30 ARE NEGATIVE IN TUMOR CELLS. THE CONTROLS STAIN  APPROPRIATELY. ADDITIONAL IMMUNOSTAINS ARE PENDING WITH REPORT TO FOLLOW.  NOTE: WE ADVISE CORRELATION WITH CLINICAL AND RADIOGRAPHIC FINDINGS. THIS CASE WAS  REVIEWED BY DR. PAZ WHO CONCURS WITH THE DIAGNOSIS.  CD20                                   POSTOPERATIVE DIAGNOSIS: Retroperitoneal lymphadenopathy    PROCEDURE PERFORMED: Incisional biopsy of a common iliac lymph node.         Surgical pathology 9/18                              Op note 9/17     Please document the clinical significance of the Pathologists findings of ___metastatic carcinoma of lymph node____.          [ x ] I agree with the Pathology Findings        [  ] I do not agree with the Pathology Findings        [  ] Clinically Undetermined         [  ] Other/Clarification of Findings: ______________________________________________    Please document in your progress notes daily for the duration of treatment until resolved and include in your discharge summary.

## 2018-09-26 DIAGNOSIS — C79.9 METASTATIC CARCINOMA: Primary | ICD-10-CM

## 2018-09-26 NOTE — PROGRESS NOTES
Physical Therapy Discharge Summary    Name: Edita Riley  MRN: [id number]   Principal Problem: Lymphadenopathy     Patient Discharged from acute Physical Therapy on 9/20/18.  Please refer to prior PT noted date on 9/19/18 for functional status.     Assessment:     Patient appropriate for care in another setting.    Objective:     GOALS:   Multidisciplinary Problems     Physical Therapy Goals        Problem: Physical Therapy Goal    Goal Priority Disciplines Outcome Goal Variances Interventions   Physical Therapy Goal     PT, PT/OT Ongoing (interventions implemented as appropriate)     Description:  PT goals until 9/25/18    1. Pt supine to sit with supervision-not met  2. Pt sit to supine with supervision-not met  3. Pt sit to stand with no AD with supervision-not met  4. Pt to perform gait 250ft with no AD with supervision.-not met  5. Pt to up/down 3 steps with L UE rail with SBA.-not met                  No goals met due to pt seen for eval only prior to D/c    Reasons for Discontinuation of Therapy Services  Transfer to alternate level of care.      Plan:     Patient Discharged to: Outpatient Therapy Services.    Chana Sharp, PT  9/26/2018

## 2018-09-27 DIAGNOSIS — R59.1 LYMPHADENOPATHY: Primary | ICD-10-CM

## 2018-10-04 ENCOUNTER — OFFICE VISIT (OUTPATIENT)
Dept: SURGERY | Facility: CLINIC | Age: 53
End: 2018-10-04
Payer: MEDICAID

## 2018-10-04 VITALS
HEIGHT: 69 IN | DIASTOLIC BLOOD PRESSURE: 69 MMHG | WEIGHT: 236.13 LBS | BODY MASS INDEX: 34.97 KG/M2 | HEART RATE: 86 BPM | SYSTOLIC BLOOD PRESSURE: 134 MMHG | TEMPERATURE: 98 F

## 2018-10-04 DIAGNOSIS — C79.9 METASTATIC ADENOCARCINOMA: Primary | ICD-10-CM

## 2018-10-04 PROCEDURE — 99024 POSTOP FOLLOW-UP VISIT: CPT | Mod: ,,, | Performed by: SURGERY

## 2018-10-04 PROCEDURE — 99213 OFFICE O/P EST LOW 20 MIN: CPT | Mod: PBBFAC | Performed by: SURGERY

## 2018-10-04 PROCEDURE — 99999 PR PBB SHADOW E&M-EST. PATIENT-LVL III: CPT | Mod: PBBFAC,,, | Performed by: SURGERY

## 2018-10-04 NOTE — PROGRESS NOTES
[facility]  Clinic Note    SUBJECTIVE:  The patient is a 53 y.o. y/o female 2 weeks s/p retroperitoneal lymphadenectomy. Denies pain, fevers, chills, nausea, vomiting, diarrhea, or constipation. Diet is progressing with normal appetite and bowel function. Denies redness around or drainage from incisions. Patient informed about path report findings and referred to Gyn onc service for further follow-up. Pain is moderate but controlled with oxycodone qnightly.     OBJECTIVE:  GEN: female in NAD  ABD: soft, non-tender, non-distended  INCISIONS: healing well without signs of infection or hernia    ASSESSMENT/PLAN:  Doing well 2 weeks s/p lymphadenectomy for enlarged lymph node. Staples removed today, incision is healing well. Patient is advised to avoid heavy lifting or strenuous activity for another 2-4 weeks. Patient may bathe and continue to take a regular diet. Will follow-up with gyn onc Dr. Lemon for further workup of positive lymph node. All questions answered; patient is comfortable with follow-up plan.      Gissel Juárez MD  General Surgery, PGY-III  Pager: [phone]     I have personally performed a detailed history and physical examination on this patient. My findings are summarized in the resident's note included in the record.  Patient instructed to bring her records from 2015 surgery in [place] to Dr Lemon's appointment

## 2018-10-05 ENCOUNTER — HOSPITAL ENCOUNTER (OUTPATIENT)
Dept: RADIOLOGY | Facility: HOSPITAL | Age: 53
Discharge: HOME | End: 2018-10-05
Attending: OBSTETRICS & GYNECOLOGY
Payer: MEDICAID

## 2018-10-05 DIAGNOSIS — R59.1 LYMPHADENOPATHY: ICD-10-CM

## 2018-10-05 PROCEDURE — 76856 US EXAM PELVIC COMPLETE: CPT | Mod: TC

## 2018-10-05 PROCEDURE — 76830 TRANSVAGINAL US NON-OB: CPT | Mod: 26,,, | Performed by: RADIOLOGY

## 2018-10-05 PROCEDURE — 76830 TRANSVAGINAL US NON-OB: CPT | Mod: TC

## 2018-10-05 PROCEDURE — 76856 US EXAM PELVIC COMPLETE: CPT | Mod: 26,,, | Performed by: RADIOLOGY

## 2018-10-07 DIAGNOSIS — K21.00 GASTROESOPHAGEAL REFLUX DISEASE WITH ESOPHAGITIS: ICD-10-CM

## 2018-10-07 DIAGNOSIS — F51.01 PRIMARY INSOMNIA: ICD-10-CM

## 2018-10-07 RX ORDER — OMEPRAZOLE 40 MG/1
40 CAPSULE, DELAYED RELEASE ORAL DAILY
Qty: 90 CAPSULE | Refills: 3 | Status: CANCELLED | OUTPATIENT
Start: 2018-10-07

## 2018-10-07 RX ORDER — TRAZODONE HYDROCHLORIDE 50 MG/1
50 TABLET ORAL NIGHTLY
Qty: 90 TABLET | Refills: 3 | Status: CANCELLED | OUTPATIENT
Start: 2018-10-07

## 2018-10-08 ENCOUNTER — OFFICE VISIT (OUTPATIENT)
Dept: INTERNAL MEDICINE | Facility: CLINIC | Age: 53
End: 2018-10-08
Payer: MEDICAID

## 2018-10-08 VITALS
WEIGHT: 235.69 LBS | OXYGEN SATURATION: 96 % | DIASTOLIC BLOOD PRESSURE: 75 MMHG | BODY MASS INDEX: 34.91 KG/M2 | HEART RATE: 93 BPM | HEIGHT: 69 IN | SYSTOLIC BLOOD PRESSURE: 125 MMHG

## 2018-10-08 DIAGNOSIS — R59.1 LYMPHADENOPATHY: ICD-10-CM

## 2018-10-08 DIAGNOSIS — M54.5 CHRONIC BILATERAL LOW BACK PAIN, WITH SCIATICA PRESENCE UNSPECIFIED: Primary | ICD-10-CM

## 2018-10-08 DIAGNOSIS — G89.29 CHRONIC BILATERAL LOW BACK PAIN, WITH SCIATICA PRESENCE UNSPECIFIED: Primary | ICD-10-CM

## 2018-10-08 PROCEDURE — 99215 OFFICE O/P EST HI 40 MIN: CPT | Mod: PBBFAC | Performed by: STUDENT IN AN ORGANIZED HEALTH CARE EDUCATION/TRAINING PROGRAM

## 2018-10-08 PROCEDURE — 99999 PR PBB SHADOW E&M-EST. PATIENT-LVL V: CPT | Mod: PBBFAC,,, | Performed by: STUDENT IN AN ORGANIZED HEALTH CARE EDUCATION/TRAINING PROGRAM

## 2018-10-08 PROCEDURE — 99213 OFFICE O/P EST LOW 20 MIN: CPT | Mod: S$PBB,,, | Performed by: STUDENT IN AN ORGANIZED HEALTH CARE EDUCATION/TRAINING PROGRAM

## 2018-10-08 NOTE — PROGRESS NOTES
"Edita Riley  1965        Subjective     Chief Complaint:    History of Present Illness:  Ms. Edita Riley is a 53 y.o. female here for a hospital follow-up. She recently had a retroperitoneal lymphadenectomy on 9/17 of an enlarged lymph node that was an incidental finding on CT abdomen originally performed to evaluate for chronic abdominal pain. The lymph node had demonstrated on PET scan to have an associated SUV of greater than 10 that was concerning for malignancy. Pathology report shows metastatic high-grade carcinoma growing with squamous cell features, suggesting possible primary in the urinary or lower gyn tracts. Her next appointment with gyn onc is 10/12. She notes right sided soreness since surgery was done. She notes this soreness mostly when she walks. She was prescribed Narco for this pain and it seems to help. She denies nausea and vomiting.    In addition, she states that she still has chronic lower back pain. She does not take anything for it at home except "Icy-hot" cream that helps only mildly. She states that the back pain is 9/10 on most days and is worsened with bending over. She states that this is the same pain that she has been having for years. The pain is located in her lower back and sometimes shoots down her legs bilaterally. She thinks that the pain is worse at night. She denies bowel and urinary incontinence. She does note some pain when she walks but states that it is mostly due to pulling around her surgical incision (which was retroperitoneal approach) on the right side of her abdomen and not her back. She was referred to the health back program for last appointment but was unable to be accepted due to insurance. A prior MRI spine from January 2018 performed at [facility] showed "L4-L5 circumferential disc bulge with superimposed midline protrusion and annular fissure, mild thecal sac narrowing, no significant foraminal narrowing."  She reports that " she doesn't think that she would be able to take therapy since she will likely have to be doing chemo soon.       Review of Systems   Constitutional: Negative for chills and fever.   HENT: Negative for hearing loss and sore throat.    Eyes: Negative for blurred vision and double vision.   Respiratory: Negative for cough and shortness of breath.    Cardiovascular: Negative for chest pain and leg swelling.   Gastrointestinal: Positive for abdominal pain. Negative for constipation, diarrhea, nausea and vomiting.   Genitourinary: Negative for dysuria and hematuria.   Musculoskeletal: Positive for back pain. Negative for falls and myalgias.   Skin: Negative for rash.   Neurological: Negative for dizziness, speech change, weakness and headaches.   Psychiatric/Behavioral: Negative for depression. The patient is not nervous/anxious.        PAST HISTORY:     Past Medical History:   Diagnosis Date    Cervical cancer 2014    Chronic back pain     Fibromyalgia     GERD (gastroesophageal reflux disease)     Hiatal hernia 2014    Hypertension     Lactose intolerance     Osteoarthritis of back     Stroke 1972       Past Surgical History:   Procedure Laterality Date    BILATERAL OOPHORECTOMY  2015    CHOLECYSTECTOMY  11/09/2016    Done at [facility], path showed chronic cholecystitis and gallstones    CHOLECYSTECTOMY-LAPAROSCOPIC N/A 11/9/2016    Performed by Joshua Goldberg[facility] 2ND FLR    cold knife conization  2014    COLONOSCOPY  2014    COLONOSCOPY N/A 10/24/2016    at [facility], Dr Thomas    COLONOSCOPY N/A 5/18/2018    Procedure: COLONOSCOPY;  Surgeon: Arden Gutiérrez MD;  Location: [facility] (4TH FLR);  Service: Endoscopy;  Laterality: N/A;    COLONOSCOPY N/A 5/18/2018    Performed by Arden Gutiérrez MD at [facility] (4TH FLR)    COLONOSCOPY N/A 10/24/2016    Performed by Arden Gutiérrez MD at [facility] (4TH FLR)    ESOPHAGOGASTRODUODENOSCOPY  2014    ESOPHAGOGASTRODUODENOSCOPY (EGD) N/A  10/24/2016    Performed by Arden Gutiérrez MD at [facility] (4TH FLR)    HYSTERECTOMY  2014    [facility] for cervical cancer    LYMPHADENECTOMY, RETROPERITONEUM Right 9/17/2018    Performed by Sebas Reed MD at [facility] OR 2ND FLR    OOPHORECTOMY      RETROPERITONEAL LYMPHADENECTOMY Right 9/17/2018    Procedure: LYMPHADENECTOMY, RETROPERITONEUM;  Surgeon: Sebas Reed MD;  [facility];  Service: General;  Laterality: Right;  ILIAC       Family History   Problem Relation Age of Onset    Heart disease Sister     Heart disease Maternal Grandmother     Colon cancer Father     Esophageal cancer Father     Cancer Father         Lung-smoker    Cancer Mother         Cervical    Cervical cancer Mother     Breast cancer Maternal Aunt     Rectal cancer Neg Hx     Stomach cancer Neg Hx     Crohn's disease Neg Hx     Ulcerative colitis Neg Hx     Diabetes Neg Hx     Hypertension Neg Hx        Social History     Socioeconomic History    Marital status:      Spouse name: None    Number of children: None    Years of education: None    Highest education level: None   Social Needs    Financial resource strain: None    Food insecurity - worry: None    Food insecurity - inability: None    Transportation needs - medical: None    Transportation needs - non-medical: None   Occupational History    None   Tobacco Use    Smoking status: Never Smoker    Smokeless tobacco: Never Used   Substance and Sexual Activity    Alcohol use: No     Alcohol/week: 0.0 oz    Drug use: No    Sexual activity: Yes     Partners: Male     Birth control/protection: None     Comment:  19 years since 1999   Other Topics Concern    None   Social History Narrative    None       MEDICATIONS & ALLERGIES:     Current Outpatient Medications on File Prior to Visit   Medication Sig    arm brace (NEOPRENE WRIST SPLINT SUPPORT) Misc 1 Units by Misc.(Non-Drug; Combo Route) route every evening.     "cholecalciferol, vitamin D3, 2,000 unit Cap Take 1 capsule by mouth once daily.    conjugated estrogens (PREMARIN) vaginal cream Place 0.5 g vaginally twice a week.    cyclobenzaprine (FLEXERIL) 10 MG tablet TAKE 1 TABLET (10 MG TOTAL) BY MOUTH NIGHTLY AS NEEDED FOR MUSCLE SPASMS.    DULoxetine (CYMBALTA) 60 MG capsule TAKE 1 CAPSULE (60 MG TOTAL) BY MOUTH ONCE DAILY.    estradiol (ESTRACE) 1 MG tablet Take 1 mg by mouth once daily.    gabapentin (NEURONTIN) 300 MG capsule Take 2 capsules (600 mg total) by mouth 2 (two) times daily.    lisinopril 10 MG tablet TAKE 1 TABLET (10 MG TOTAL) BY MOUTH ONCE DAILY.    meclizine (ANTIVERT) 25 mg tablet Take 1 tablet (25 mg total) by mouth 3 (three) times daily as needed for Dizziness.    omeprazole (PRILOSEC) 40 MG capsule Take 1 capsule (40 mg total) by mouth once daily.    oxyCODONE-acetaminophen (PERCOCET) 5-325 mg per tablet Take 1 tablet by mouth every 4 (four) hours as needed.    sucralfate (CARAFATE) 1 gram tablet Take 1 tablet (1 g total) by mouth 2 (two) times daily.    traMADol (ULTRAM) 50 mg tablet Take 50 mg by mouth every 6 (six) hours as needed for Pain.    traZODone (DESYREL) 50 MG tablet Take 1 tablet (50 mg total) by mouth every evening.     No current facility-administered medications on file prior to visit.        Review of patient's allergies indicates:   Allergen Reactions    Bee sting [allergen ext-venom-honey bee]      Rash      Grass pollen-bermuda, standard      rash       OBJECTIVE:     Vital Signs:  Vitals:    10/08/18 0905   BP: 125/75   BP Location: Left arm   Patient Position: Sitting   BP Method: Large (Manual)   Pulse: 93   SpO2: 96%   Weight: 106.9 kg (235 lb 10.8 oz)   Height: 5' 9" (1.753 m)       Body mass index is 34.8 kg/m².     Physical Exam:  General:  Well developed, well nourished, no acute distress  Head: Normocephalic, atraumatic  Eyes: PERRL, EOMI, clear sclera  Throat: No posterior pharyngeal erythema or exudate, no " tonsillar exudate  Neck: supple, normal ROM, no thyromegaly   CVS:  RRR, S1 and S2 normal, no murmurs, rubs, gallops, 2+ peripheral pulses  Resp:  Lungs clear to auscultation, no wheezes, rales, rhonchi, cough  GI:  Abdomen soft, non-distended, normoactive bowel sounds; slight tenderness on right side with deep palpation, well healing surgical right-sided incision without erythema, purulence or drainage.  MSK:  No muscle atrophy, cyanosis, peripheral edema   Skin:  No rashes, ulcers, erythema  Neuro:  Facial weakness on left side; positive straight leg test on right. Strength 5/5 in LE bilaterally. No pain elicited on flexion or extension of spine.  Psych:  Appropriate mood and affect, normal judgement    Laboratory  Lab Results   Component Value Date    WBC 10.94 09/20/2018    HGB 11.7 (L) 09/20/2018    HCT 36.9 (L) 09/20/2018    MCV 91 09/20/2018     09/20/2018     Lab Results   Component Value Date     (H) 09/20/2018     09/20/2018    K 4.0 09/20/2018     09/20/2018    CO2 28 09/20/2018    BUN 10 09/20/2018    CREATININE 0.8 09/20/2018    CALCIUM 9.1 09/20/2018    MG 2.0 09/20/2018     No results found for: INR, PROTIME  Lab Results   Component Value Date    HGBA1C 5.6 11/16/2017           ASSESSMENT & PLAN:   [facility] 53 y.[facility] female presenting for hospital follow up after recent retroperitoneal lymphadenectomy (9/17).     1. Chronic Lower Back Pain  -previous MRI from 1/2018 at [place] showed L4-L5 circumferential disc bulge with superimposed midline protrusion and annular fissure, mild thecal sac narrowing, no significant foraminal narrowing  -unclear if pain represents underlying Degenerative Disc Disease; Patient denies bowel incontinence and urinary incontinence.   -Advised to alternate between ibprofen and tylenol  -Will not repeat MRI today since Gyn Onc will likely perform MRI for cancer staging and evaluation at appointment this week    2. Malignant  Lymphadenopathy  -Patient recently diagnosed by pathology reports with metastatic high-grade carcinoma growing with squamous cell features, suggesting possible primary in the urinary or lower gyn tracts  -Next appointment with Gyn Onc this week on 10/12    RTC in 3 months for follow up.     Patient case discussed with Dr. Gilliam.     Audrey Mustafa MD  Internal Medicine PGY1  [facility]  [address], [place] [id number] [phone]

## 2018-10-08 NOTE — PROGRESS NOTES
I have reviewed the notes, assessments, and/or procedures performed by Dr. Mustafa, I concur with her/his documentation of Edita Riley.

## 2018-10-09 ENCOUNTER — DOCUMENTATION ONLY (OUTPATIENT)
Dept: GYNECOLOGIC ONCOLOGY | Facility: CLINIC | Age: 53
End: 2018-10-09

## 2018-10-09 NOTE — PROGRESS NOTES
Referring physician:  Dr. Sebas Reed  Reason for referral:  Incisional biopsy of retroperitoneal periaortic node that shows squamous cell carcinoma consistent with gynecologic primary      Sept 2018: chronic abdominal pain referred for consideration of biopsy of a 2 cm right common iliac artery bifurcation node  Node was evident on scan in 4/18 and again in 7/18 - no change  Patient's symptoms are non specific, diffuse, have not resulted in weight loss  Patient already carries a diagnosis of diffuse pain syndromes including fibromyalgia  Underwent retroperitoneal approach for incisional biopsy of right common iliac LN. Pathology showed:  Supplemental Diagnosis  METASTATIC HIGH-GRADE CARCINOMA GROWING WITH SQUAMOUS CELL FEATURES. THE RESULTS OF  ADDITIONAL IMMUNOSTAINS ARE AS FOLLOWS: CK5/6, P63 AND CK7 ARE POSITIVE. TTF, S100 AND  CK20 ARE ALL NEGATIVE. THE CONTROLS STAIN APPROPRIATELY.  NOTE: GIVEN THESE RESULTS THIS MAY REPRESENT A PRIMARY IN THE URINARY OR LOWER GYN  TRACTS. DR. JAMESON HAS ALSO REVIEWED THIS CASE AND CONCURS WITH THE DIAGNOSIS.    Pap:  June 6, 2018:  Normal    She has a history of hysterectomy for cervical cancer in 2014.

## 2018-10-11 PROBLEM — Z85.41 HISTORY OF CERVICAL CANCER: Status: ACTIVE | Noted: 2018-10-11

## 2018-10-11 PROBLEM — C77.9 METASTATIC SQUAMOUS CELL CARCINOMA TO LYMPH NODE: Status: ACTIVE | Noted: 2018-10-11

## 2018-10-11 NOTE — PROGRESS NOTES
Subjective:       Patient ID: Edita Riley is a 53 y.o. female.    Chief Complaint: Metastatic Carcinoma (Consult )    HPI     Referring physician:  Dr. Sebas Reed  Reason for referral:  Incisional biopsy of retroperitoneal periaortic node that shows squamous cell carcinoma consistent with gynecologic primary        Aug 2018: chronic abdominal pain referred for consideration of biopsy of a 2 cm right common iliac artery bifurcation node  Node was evident on scan in 4/18 and again in 7/18 - no change  Patient's symptoms are non specific, diffuse, have not resulted in weight loss  Patient already carries a diagnosis of diffuse pain syndromes including fibromyalgia      Aug 2018: PET scan Right common iliac lymph node suspicious for malignancy.  This could be benign or malignant lymphoproliferative disorder metastatic disease.  This is an a risky position for percutaneous biopsy.     Sept 17, 2018: Underwent retroperitoneal approach for incisional biopsy of right common iliac LN. Pathology showed:  Supplemental Diagnosis  METASTATIC HIGH-GRADE CARCINOMA GROWING WITH SQUAMOUS CELL FEATURES. THE RESULTS OF  ADDITIONAL IMMUNOSTAINS ARE AS FOLLOWS: CK5/6, P63 AND CK7 ARE POSITIVE. TTF, S100 AND  CK20 ARE ALL NEGATIVE. THE CONTROLS STAIN APPROPRIATELY.  NOTE: GIVEN THESE RESULTS THIS MAY REPRESENT A PRIMARY IN THE URINARY OR LOWER GYN  TRACTS. DR. JAMESON HAS ALSO REVIEWED THIS CASE AND CONCURS WITH THE DIAGNOSIS.     Pap:  June 6, 2018:  Normal     She has a history of hysterectomy for cervical cancer in 2015 in [place], [place]. Hyst was for abnormal pap. Incidental finding of cervical cancer on the hyst specimen. She does not know any other details.              Review of Systems   Constitutional: Positive for chills and fever (chronic temperatures due to fibromyalgia. ). Negative for fatigue.   Respiratory: Positive for cough and shortness of breath. Negative for wheezing.    Cardiovascular: Negative for  "chest pain, palpitations and leg swelling.   Gastrointestinal: Negative for abdominal pain, constipation, diarrhea, nausea and vomiting.   Genitourinary: Negative for difficulty urinating, dysuria, frequency, genital sores, hematuria, urgency, vaginal bleeding, vaginal discharge and vaginal pain.   Musculoskeletal: Positive for myalgias. Negative for gait problem.   Neurological: Positive for dizziness, weakness and headaches. Negative for numbness.   Hematological: Negative for adenopathy. Does not bruise/bleed easily.   Psychiatric/Behavioral: The patient is not nervous/anxious.        Objective:   /79   Pulse 100   Ht 5' 9" (1.753 m)   Wt 104.5 kg (230 lb 6.1 oz)   LMP 06/08/2014 (Approximate)   BMI 34.02 kg/m²      Physical Exam   Constitutional: She is oriented to person, place, and time. She appears well-developed and well-nourished.   HENT:   Head: Normocephalic and atraumatic.   Eyes: No scleral icterus.   Neck: No tracheal deviation present. No thyroid mass and no thyromegaly present.   Cardiovascular: Normal rate and regular rhythm.   Pulmonary/Chest: Effort normal and breath sounds normal. She has no wheezes. Right breast exhibits no mass, no nipple discharge, no skin change and no tenderness. Left breast exhibits no mass, no nipple discharge, no skin change and no tenderness.   Abdominal: She exhibits no distension and no mass. There is no hepatosplenomegaly. There is no tenderness. There is no rebound and no guarding.   Genitourinary:   Genitourinary Comments: Bimanual exam:  Vulva: no lesions. Normal appearance  Urethra: Normal size and location. No lesions  Bladder: No masses or tenderness.  Vagina: normal mucosa. No lesion  Cervix: absent.   Uterus: absent.  Adnexa: no masses.  Rectovaginal: No posterior cul de sac thickening or nodularity.  Rectal: no masses. Nontender. Normal tone.      Musculoskeletal: She exhibits no edema or tenderness.   Lymphadenopathy:     She has no cervical " adenopathy.     She has no axillary adenopathy.        Right: No inguinal and no supraclavicular adenopathy present.        Left: No inguinal and no supraclavicular adenopathy present.   Neurological: She is alert and oriented to person, place, and time.   Skin: Skin is warm and dry. No rash noted.   Psychiatric: She has a normal mood and affect. Her behavior is normal. Judgment and thought content normal.       Assessment:       1. 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 History of cervical cancer        Plan:   Metastatic squamous cell carcinoma to lymph node  Most likely metastatic SCCA from the cervix.   I have requested op and pathology report from [facility] to review.   Would recommend RT.   Had negative PET except for right pelvic node.     Patient with increased stress/depression over death of daughter in March 2018.     -     Ambulatory Referral to Hematology/Oncology/Psychology  -     Ambulatory referral to Radiation Oncology    History of cervical cancer    -     Ambulatory referral to Radiation Oncology      Distress Screening Results: Psychosocial Distress screening score of Distress Score: 10 noted and reviewed. A referral to Dr. Kumar Psychologist initiated.

## 2018-10-12 ENCOUNTER — INITIAL CONSULT (OUTPATIENT)
Dept: GYNECOLOGIC ONCOLOGY | Facility: CLINIC | Age: 53
End: 2018-10-12
Payer: MEDICAID

## 2018-10-12 ENCOUNTER — TELEPHONE (OUTPATIENT)
Dept: GYNECOLOGIC ONCOLOGY | Facility: CLINIC | Age: 53
End: 2018-10-12

## 2018-10-12 ENCOUNTER — TELEPHONE (OUTPATIENT)
Dept: UROLOGY | Facility: CLINIC | Age: 53
End: 2018-10-12

## 2018-10-12 VITALS
SYSTOLIC BLOOD PRESSURE: 133 MMHG | HEART RATE: 100 BPM | BODY MASS INDEX: 34.12 KG/M2 | HEIGHT: 69 IN | WEIGHT: 230.38 LBS | DIASTOLIC BLOOD PRESSURE: 79 MMHG

## 2018-10-12 DIAGNOSIS — Z85.41 HISTORY OF CERVICAL CANCER: ICD-10-CM

## 2018-10-12 DIAGNOSIS — C77.9 METASTATIC SQUAMOUS CELL CARCINOMA TO LYMPH NODE: Primary | ICD-10-CM

## 2018-10-12 PROCEDURE — 99205 OFFICE O/P NEW HI 60 MIN: CPT | Mod: S$PBB,,, | Performed by: OBSTETRICS & GYNECOLOGY

## 2018-10-12 PROCEDURE — 99214 OFFICE O/P EST MOD 30 MIN: CPT | Mod: PBBFAC | Performed by: OBSTETRICS & GYNECOLOGY

## 2018-10-12 PROCEDURE — 88175 CYTOPATH C/V AUTO FLUID REDO: CPT

## 2018-10-12 PROCEDURE — 99999 PR PBB SHADOW E&M-EST. PATIENT-LVL IV: CPT | Mod: PBBFAC,,, | Performed by: OBSTETRICS & GYNECOLOGY

## 2018-10-12 NOTE — TELEPHONE ENCOUNTER
----- Message from Veena Altamirano sent at 10/12/2018  3:05 PM CDT -----  RE: EMMA SWARTZ [[id number]]  Dr Reed /referring the above pt to see Dr Smith/please contact pt at     RE: METASTATIC HIGH-GRADE CARCINOMA GROWING WITH SQUAMOUS CELL FEATURES. THE RESULTS OF  ADDITIONAL IMMUNOSTAINS ARE AS FOLLOWS: CK5/6, P63 AND CK7 ARE POSITIVE. TTF, S100 AND  CK20 ARE ALL NEGATIVE. THE CONTROLS STAIN APPROPRIATELY.  NOTE: GIVEN THESE RESULTS THIS MAY REPRESENT A PRIMARY IN THE URINARY OR LOWER GYN  TRACTS. DR. JAMESON HAS ALSO REVIEWED THIS CASE AND CONCURS WITH THE DIAGNOSIS

## 2018-10-12 NOTE — LETTER
October 14, 2018      Sebas Reed MD  [address]  [facility] [address]           [facility] - GYN Oncology  [address]  [facility] [address]  Phone: [phone]          Patient: Edita Riley   MR Number: [id number]   YOB: 1965   Date of Visit: 10/12/2018       Dear Dr. Sebas Reed:    Thank you for referring Edita Riley to me for evaluation. Attached you will find relevant portions of my assessment and plan of care.    If you have questions, please do not hesitate to call me. I look forward to following Edita Riley along with you.    Sincerely,    Refugio Lemon MD    Enclosure  CC:  No Recipients    If you would like to receive this communication electronically, please contact [email or web address] or [phone] to request more information on [employer] access.    For providers and/or their staff who would like to refer a patient to [facility], please contact us through our one-stop-shop provider referral line, [facility], at [phone].    If you feel you have received this communication in error or would no longer like to receive these types of communications, please e-mail [email or web address]

## 2018-10-15 ENCOUNTER — OFFICE VISIT (OUTPATIENT)
Dept: UROLOGY | Facility: CLINIC | Age: 53
End: 2018-10-15
Payer: MEDICAID

## 2018-10-15 VITALS
BODY MASS INDEX: 34.29 KG/M2 | HEART RATE: 90 BPM | HEIGHT: 69 IN | WEIGHT: 231.5 LBS | SYSTOLIC BLOOD PRESSURE: 132 MMHG | DIASTOLIC BLOOD PRESSURE: 79 MMHG

## 2018-10-15 DIAGNOSIS — Z85.41 HISTORY OF CERVICAL CANCER: ICD-10-CM

## 2018-10-15 DIAGNOSIS — C77.9 METASTATIC SQUAMOUS CELL CARCINOMA TO LYMPH NODE: Primary | ICD-10-CM

## 2018-10-15 PROCEDURE — 99999 PR PBB SHADOW E&M-EST. PATIENT-LVL III: CPT | Mod: PBBFAC,,, | Performed by: UROLOGY

## 2018-10-15 PROCEDURE — 99204 OFFICE O/P NEW MOD 45 MIN: CPT | Mod: S$PBB,,, | Performed by: UROLOGY

## 2018-10-15 PROCEDURE — 88112 CYTOPATH CELL ENHANCE TECH: CPT | Mod: 26,,, | Performed by: PATHOLOGY

## 2018-10-15 PROCEDURE — 88112 CYTOPATH CELL ENHANCE TECH: CPT | Performed by: PATHOLOGY

## 2018-10-15 PROCEDURE — 99213 OFFICE O/P EST LOW 20 MIN: CPT | Mod: PBBFAC | Performed by: UROLOGY

## 2018-10-15 RX ORDER — AMOXICILLIN AND CLAVULANATE POTASSIUM 875; 125 MG/1; MG/1
1 TABLET, FILM COATED ORAL ONCE
Status: CANCELLED | OUTPATIENT
Start: 2018-10-15 | End: 2018-10-15

## 2018-10-15 NOTE — PROGRESS NOTES
Subjective:       Patient ID: Edita Riley is a 53 y.o. female.    Chief Complaint: Advice Only      HPI: Edita Riley is a 53 y.o. 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    The patient underwent a recent open excisional biopsy of an iliac lymph node by Dr. Reed. The node was PET positive. Pathology revealed metastatic squamous cell cancer. The patient has a history of a hysterectomy for cervical cancer in 2015 in [place], [place]. The hysterectomy was done for an abnormal pap smear. Incidental finding of cervical cancer were on the hysterectomy specimen. She does not know any other details.    The patient denies hematuria or voiding symptoms.    She has no history of smoking or occupational exposures.    She presents today to discuss urologic evaluation of her recent diagnosis.    Review of patient's allergies indicates:   Allergen Reactions    Bee sting [allergen ext-venom-honey bee]      Rash      Grass pollen-bermuda, standard      rash       Current Outpatient Medications   Medication Sig Dispense Refill    arm brace (NEOPRENE WRIST SPLINT SUPPORT) Misc 1 Units by Misc.(Non-Drug; Combo Route) route every evening. 1 each 0    cholecalciferol, vitamin D3, 2,000 unit Cap Take 1 capsule by mouth once daily.      conjugated estrogens (PREMARIN) vaginal cream Place 0.5 g vaginally twice a week. 45 g 1    cyclobenzaprine (FLEXERIL) 10 MG tablet TAKE 1 TABLET (10 MG TOTAL) BY MOUTH NIGHTLY AS NEEDED FOR MUSCLE SPASMS.  1    DULoxetine (CYMBALTA) 60 MG capsule TAKE 1 CAPSULE (60 MG TOTAL) BY MOUTH ONCE DAILY. 90 capsule 3    estradiol (ESTRACE) 1 MG tablet Take 1 mg by mouth once daily.  11    gabapentin (NEURONTIN) 300 MG capsule Take 2 capsules (600 mg total) by mouth 2 (two) times daily. 180 capsule 1    lisinopril 10 MG tablet TAKE 1 TABLET (10 MG TOTAL) BY MOUTH  ONCE DAILY. 90 tablet 3    meclizine (ANTIVERT) 25 mg tablet Take 1 tablet (25 mg total) by mouth 3 (three) times daily as needed for Dizziness. 30 tablet 6    omeprazole (PRILOSEC) 40 MG capsule Take 1 capsule (40 mg total) by mouth once daily. 90 capsule 3    oxyCODONE-acetaminophen (PERCOCET) 5-325 mg per tablet Take 1 tablet by mouth every 4 (four) hours as needed. 28 tablet 0    sucralfate (CARAFATE) 1 gram tablet Take 1 tablet (1 g total) by mouth 2 (two) times daily. 180 tablet 3    traMADol (ULTRAM) 50 mg tablet Take 50 mg by mouth every 6 (six) hours as needed for Pain.      traZODone (DESYREL) 50 MG tablet Take 1 tablet (50 mg total) by mouth every evening. 90 tablet 3     No current facility-administered medications for this visit.        Past Medical History:   Diagnosis Date    Cervical cancer 2014    Chronic back pain     Fibromyalgia     GERD (gastroesophageal reflux disease)     Hiatal hernia 2014    History of cervical cancer 10/11/2018    Hypertension     Lactose intolerance     Metastatic squamous cell carcinoma to lymph node 10/11/2018    Osteoarthritis of back     Stroke 1972       Past Surgical History:   Procedure Laterality Date    BILATERAL OOPHORECTOMY  2015    CHOLECYSTECTOMY  11/09/2016    Done at [facility], path showed chronic cholecystitis and gallstones    CHOLECYSTECTOMY-LAPAROSCOPIC N/A 11/9/2016    Performed by Joshua Goldberg, [facility] at [facility] OR 2ND FLR    cold knife conization  2014    COLONOSCOPY  2014    COLONOSCOPY N/A 10/24/2016    at [facility], Dr Thomas    COLONOSCOPY N/A 5/18/2018    Procedure: COLONOSCOPY;  Surgeon: Arden Gutiérrez MD;  Location: [facility] (4TH FLR);  Service: Endoscopy;  Laterality: N/A;    COLONOSCOPY N/A 5/18/2018    Performed by Arden Gutiérrez MD at [facility] (4TH FLR)    COLONOSCOPY N/A 10/24/2016    Performed by Arden Gutiérrez MD at [facility] (4TH FLR)    ESOPHAGOGASTRODUODENOSCOPY  2014    ESOPHAGOGASTRODUODENOSCOPY (EGD)  N/A 10/24/2016    Performed by Arden G[facility]érrez MD at [facility] ENDO (4TH FLR)    HYSTERECTOMY  2014    [facility]H for cervical cancer    LYMPHADENECTOMY, RETROPERITONEUM Right 9/17/2018    Performed by Sebas Reed MD at [facility] OR 2ND FLR    OOPHORECTOMY      RETROPERITONEAL LYMPHADENECTOMY Right 9/17/2018    Procedure: LYMPHADENECTOMY, RETROPERITONEUM;  Surgeon: Sebas Reed MD;  Location: [facility] OR 2ND FLR;  Service: General;  Laterality: Right;  ILIAC       Family History   Problem Relation Age of Onset    Heart disease Sister     Heart disease Maternal Grandmother     Colon cancer Father     Esophageal cancer Father     Cancer Father         Lung-smoker    Cancer Mother         Cervical    Cervical cancer Mother     Breast cancer Maternal Aunt     Rectal cancer Neg Hx     Stomach cancer Neg Hx     Crohn's disease Neg Hx     Ulcerative colitis Neg Hx     Diabetes Neg Hx     Hypertension Neg Hx        Review of Systems    Review of Systems   Constitutional: Negative for fever, chills, activity change, appetite change and unexpected weight change.   HENT: Negative for congestion, nosebleeds, sneezing, sore throat and trouble swallowing.    Eyes: Negative for pain, discharge, redness and visual disturbance.   Respiratory: Negative for cough, choking, chest tightness and shortness of breath.    Cardiovascular: Negative for chest pain, palpitations and leg swelling.   Gastrointestinal: Negative for nausea, vomiting, abdominal pain, diarrhea, blood in stool, abdominal distention and anal bleeding.  Genitourinary: As documented per HPI   Endocrine: Negative for cold intolerance, heat intolerance, polydipsia, polyphagia and polyuria.   Musculoskeletal: Negative for myalgias, gait problem, neck pain and neck stiffness.   Skin: Negative for color change, pallor, rash and wound.   Allergic/Immunologic: Negative for immunocompromised state.   Neurological: Negative for seizures, facial asymmetry, speech  difficulty, weakness and light-headedness.   Hematological: Negative for adenopathy. Does not bruise/bleed easily.   Psychiatric/Behavioral: Negative for hallucinations, behavioral problems, self-injury and agitation. The patient is not hyperactive.      All other systems were reviewed and were negative.    Objective:     Vitals:    10/15/18 1038   BP: 132/79   Pulse: 90     Physical Exam   Vitals reviewed.  Constitutional: She is oriented to person, place, and time. She appears well-developed and well-nourished. No distress.   HENT:   Head: Normocephalic and atraumatic.   Right Ear: External ear normal.   Left Ear: External ear normal.   Nose: Nose normal.   Eyes: EOM are normal. Pupils are equal, round, and reactive to light. Right eye exhibits no discharge. Left eye exhibits no discharge.   Neck: Normal range of motion. Neck supple. No tracheal deviation present. No thyroid enlargement or tenderness.  Cardiovascular: Normal heart sounds and intact distal pulses. No signs of peripheral edema.    Pulmonary/Chest: Effort normal and breath sounds normal. No respiratory distress. She has no wheezes.   Abdominal: Soft. Bowel sounds are normal. She exhibits no distension. There is no tenderness. There is no rebound. No hepatic or splenic enlargement.   Musculoskeletal: Normal range of motion. She exhibits no edema.   Neurological: She is alert and oriented to person, place, and time. Coordination normal.   Skin: Skin is warm and dry. She is not diaphoretic.   Lymphatic: No palpable lymph nodes.  Psychiatric: She has a normal mood and affect. Her behavior is normal. Judgment and thought content normal.     Lab Results   Component Value Date    CREATININE 0.8 09/20/2018     Lab Results   Component Value Date    EGFRNONAA >60.0 09/20/2018     Lab Results   Component Value Date    ESTGFRAFRICA >60.0 09/20/2018     I have personally reviewed all relevant  imaging.    CT abdomen/pelvis with contrast (7/24/18): Kidneys are  normal in size and location.  No hydronephrosis or renal mass.  Visualized ureters are normal in course to the urinary bladder.  Urinary bladder is normal.  Multiple phleboliths are noted in the pelvis.  Uterus and ovaries are surgically removed. There is redemonstration of an enlarged lymph node at the bifurcation of the right common iliac measuring 2.0 cm (previously measured 2.0 cm).  Multiple stable mildly enlarged lymph nodes are again identified along the bilateral iliac chains.  Left iliac lymph node measures 1.1 cm (previously 1.2 cm).  Right iliac lymph node measures 1.0 cm (previously 1.0 cm).  A few stable periaortic lymph nodes are also noted, unchanged.    Assessment:       1. 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 History of cervical cancer        Plan:     Edita was seen today for advice only.    Diagnoses and all orders for this visit:    Metastatic squamous cell carcinoma to lymph node  -     Cystoscopy; Future  -     Cytology, urine    History of cervical cancer  -     Cystoscopy; Future  -     Cytology, urine    Other orders  -     amoxicillin-clavulanate 875-125mg per tablet 1 tablet; Take 1 tablet by mouth once.    The patient has metastatic squamous cell cancer of unclear origin, however it is more likely to be gynecologic than urologic.    Nevertheless, I do think cystoscopy is indicated to rule out a bladder primary. We will also check her urine cytology today for abnormal cells.    If this evaluation is negative, I would favor a gynecologic origin.    I answered all her questions.    I encouraged her or any of her family members to call or email me with questions and/or concerns.    I spent 45 minutes with the patient of which more than half was spent in coordinating the patient's care as well as in direct consultation with the patient in regards to our treatment and plan.

## 2018-10-15 NOTE — H&P (VIEW-ONLY)
Subjective:       Patient ID: Edita Riley is a 53 y.o. female.    Chief Complaint: Advice Only      HPI: Edita Riley is a 53 y.o. 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    The patient underwent a recent open excisional biopsy of an iliac lymph node by Dr. Reed. The node was PET positive. Pathology revealed metastatic squamous cell cancer. The patient has a history of a hysterectomy for cervical cancer in 2015 in [place], [place]. The hysterectomy was done for an abnormal pap smear. Incidental finding of cervical cancer were on the hysterectomy specimen. She does not know any other details.    The patient denies hematuria or voiding symptoms.    She has no history of smoking or occupational exposures.    She presents today to discuss urologic evaluation of her recent diagnosis.    Review of patient's allergies indicates:   Allergen Reactions    Bee sting [allergen ext-venom-honey bee]      Rash      Grass pollen-bermuda, standard      rash       Current Outpatient Medications   Medication Sig Dispense Refill    arm brace (NEOPRENE WRIST SPLINT SUPPORT) Misc 1 Units by Misc.(Non-Drug; Combo Route) route every evening. 1 each 0    cholecalciferol, vitamin D3, 2,000 unit Cap Take 1 capsule by mouth once daily.      conjugated estrogens (PREMARIN) vaginal cream Place 0.5 g vaginally twice a week. 45 g 1    cyclobenzaprine (FLEXERIL) 10 MG tablet TAKE 1 TABLET (10 MG TOTAL) BY MOUTH NIGHTLY AS NEEDED FOR MUSCLE SPASMS.  1    DULoxetine (CYMBALTA) 60 MG capsule TAKE 1 CAPSULE (60 MG TOTAL) BY MOUTH ONCE DAILY. 90 capsule 3    estradiol (ESTRACE) 1 MG tablet Take 1 mg by mouth once daily.  11    gabapentin (NEURONTIN) 300 MG capsule Take 2 capsules (600 mg total) by mouth 2 (two) times daily. 180 capsule 1    lisinopril 10 MG tablet TAKE 1 TABLET (10 MG TOTAL) BY MOUTH  ONCE DAILY. 90 tablet 3    meclizine (ANTIVERT) 25 mg tablet Take 1 tablet (25 mg total) by mouth 3 (three) times daily as needed for Dizziness. 30 tablet 6    omeprazole (PRILOSEC) 40 MG capsule Take 1 capsule (40 mg total) by mouth once daily. 90 capsule 3    oxyCODONE-acetaminophen (PERCOCET) 5-325 mg per tablet Take 1 tablet by mouth every 4 (four) hours as needed. 28 tablet 0    sucralfate (CARAFATE) 1 gram tablet Take 1 tablet (1 g total) by mouth 2 (two) times daily. 180 tablet 3    traMADol (ULTRAM) 50 mg tablet Take 50 mg by mouth every 6 (six) hours as needed for Pain.      traZODone (DESYREL) 50 MG tablet Take 1 tablet (50 mg total) by mouth every evening. 90 tablet 3     No current facility-administered medications for this visit.        Past Medical History:   Diagnosis Date    Cervical cancer 2014    Chronic back pain     Fibromyalgia     GERD (gastroesophageal reflux disease)     Hiatal hernia 2014    History of cervical cancer 10/11/2018    Hypertension     Lactose intolerance     Metastatic squamous cell carcinoma to lymph node 10/11/2018    Osteoarthritis of back     Stroke 1972       Past Surgical History:   Procedure Laterality Date    BILATERAL OOPHORECTOMY  2015    CHOLECYSTECTOMY  11/09/2016    Done at [facility], path showed chronic cholecystitis and gallstones    CHOLECYSTECTOMY-LAPAROSCOPIC N/A 11/9/2016    Performed by Joshua Goldberg[facility] at [facility] OR 2ND FLR    cold knife conization  2014    COLONOSCOPY  2014    COLONOSCOPY N/A 10/24/2016    at [facility], Dr Thomas    COLONOSCOPY N/A 5/18/2018    Procedure: COLONOSCOPY;  Surgeon: Arden Gutiérrez MD;  [facility] ([facility]);  Service: Endoscopy;  Laterality: N/A;    COLONOSCOPY N/A 5/18/2018    [facility]    COLONOSCOPY N/A 10/24/2016    [facility]    ESOPHAGOGASTRODUODENOSCOPY  2014    ESOPHAGOGASTRODUODENOSCOPY (EGD)  N/A 10/24/2016    [facility] at [facility] ENDO (4TH FLR)    HYSTERECTOMY  2014    TVH for cervical cancer    LYMPHADENECTOMY, RETROPERITONEUM Right 9/17/2018    Performed [facility] Sebas Reed [facility] at [facility] OR 2ND FLR    OOPHORECTOMY      RETROPERITONEAL LYMPHADENECTOMY Right 9/17/2018    Procedure: LYMPHADENECTOMY, RETROPERITONEUM;  Surgeon: Sebas Reed MD;  Location: [facility] OR 2ND FLR;  Service: General;  Laterality: Right;  ILIAC       Family History   Problem Relation Age of Onset    Heart disease Sister     Heart disease Maternal Grandmother     Colon cancer Father     Esophageal cancer Father     Cancer Father         Lung-smoker    Cancer Mother         Cervical    Cervical cancer Mother     Breast cancer Maternal Aunt     Rectal cancer Neg Hx     Stomach cancer Neg Hx     Crohn's disease Neg Hx     Ulcerative colitis Neg Hx     Diabetes Neg Hx     Hypertension Neg Hx        Review of Systems    Review of Systems   Constitutional: Negative for fever, chills, activity change, appetite change and unexpected weight change.   HENT: Negative for congestion, nosebleeds, sneezing, sore throat and trouble swallowing.    Eyes: Negative for pain, discharge, redness and visual disturbance.   Respiratory: Negative for cough, choking, chest tightness and shortness of breath.    Cardiovascular: Negative for chest pain, palpitations and leg swelling.   Gastrointestinal: Negative for nausea, vomiting, abdominal pain, diarrhea, blood in stool, abdominal distention and anal bleeding.  Genitourinary: As documented per HPI   Endocrine: Negative for cold intolerance, heat intolerance, polydipsia, polyphagia and polyuria.   Musculoskeletal: Negative for myalgias, gait problem, neck pain and neck stiffness.   Skin: Negative for color change, pallor, rash and wound.   Allergic/Immunologic: Negative for immunocompromised state.   Neurological: Negative for seizures, facial asymmetry, speech  difficulty, weakness and light-headedness.   Hematological: Negative for adenopathy. Does not bruise/bleed easily.   Psychiatric/Behavioral: Negative for hallucinations, behavioral problems, self-injury and agitation. The patient is not hyperactive.      All other systems were reviewed and were negative.    Objective:     Vitals:    10/15/18 1038   BP: 132/79   Pulse: 90     Physical Exam   Vitals reviewed.  Constitutional: She is oriented to person, place, and time. She appears well-developed and well-nourished. No distress.   HENT:   Head: Normocephalic and atraumatic.   Right Ear: External ear normal.   Left Ear: External ear normal.   Nose: Nose normal.   Eyes: EOM are normal. Pupils are equal, round, and reactive to light. Right eye exhibits no discharge. Left eye exhibits no discharge.   Neck: Normal range of motion. Neck supple. No tracheal deviation present. No thyroid enlargement or tenderness.  Cardiovascular: Normal heart sounds and intact distal pulses. No signs of peripheral edema.    Pulmonary/Chest: Effort normal and breath sounds normal. No respiratory distress. She has no wheezes.   Abdominal: Soft. Bowel sounds are normal. She exhibits no distension. There is no tenderness. There is no rebound. No hepatic or splenic enlargement.   Musculoskeletal: Normal range of motion. She exhibits no edema.   Neurological: She is alert and oriented to person, place, and time. Coordination normal.   Skin: Skin is warm and dry. She is not diaphoretic.   Lymphatic: No palpable lymph nodes.  Psychiatric: She has a normal mood and affect. Her behavior is normal. Judgment and thought content normal.     Lab Results   Component Value Date    CREATININE 0.8 09/20/2018     Lab Results   Component Value Date    EGFRNONAA >60.0 09/20/2018     Lab Results   Component Value Date    ESTGFRAFRICA >60.0 09/20/2018     I have personally reviewed all relevant  imaging.    CT abdomen/pelvis with contrast (7/24/18): Kidneys are  normal in size and location.  No hydronephrosis or renal mass.  Visualized ureters are normal in course to the urinary bladder.  Urinary bladder is normal.  Multiple phleboliths are noted in the pelvis.  Uterus and ovaries are surgically removed. There is redemonstration of an enlarged lymph node at the bifurcation of the right common iliac measuring 2.0 cm (previously measured 2.0 cm).  Multiple stable mildly enlarged lymph nodes are again identified along the bilateral iliac chains.  Left iliac lymph node measures 1.1 cm (previously 1.2 cm).  Right iliac lymph node measures 1.0 cm (previously 1.0 cm).  A few stable periaortic lymph nodes are also noted, unchanged.    Assessment:       1. 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 History of cervical cancer        Plan:     Edita was seen today for advice only.    Diagnoses and all orders for this visit:    Metastatic squamous cell carcinoma to lymph node  -     Cystoscopy; Future  -     Cytology, urine    History of cervical cancer  -     Cystoscopy; Future  -     Cytology, urine    Other orders  -     amoxicillin-clavulanate 875-125mg per tablet 1 tablet; Take 1 tablet by mouth once.    The patient has metastatic squamous cell cancer of unclear origin, however it is more likely to be gynecologic than urologic.    Nevertheless, I do think cystoscopy is indicated to rule out a bladder primary. We will also check her urine cytology today for abnormal cells.    If this evaluation is negative, I would favor a gynecologic origin.    I answered all her questions.    I encouraged her or any of her family members to call or email me with questions and/or concerns.    I spent 45 minutes with the patient of which more than half was spent in coordinating the patient's care as well as in direct consultation with the patient in regards to our treatment and plan.

## 2018-10-15 NOTE — PATIENT INSTRUCTIONS
Cystoscopy    Cystoscopy is a procedure that lets your doctor look directly inside your urethra and bladder. It can be used to:  · Help diagnose a problem with your urethra, bladder, or kidneys.  · Take a sample (biopsy) of bladder or urethral tissue.  · Treat certain problems (such as removing kidney stones).  · Place a stent to bypass an obstruction.  · Take special X-rays of the kidneys.  Based on the findings, your doctor may recommend other tests or treatments.  What is a cystoscope?  A cystoscope is a telescope-like instrument that contains lenses and fiberoptics (small glass wires that make bright light). The cystoscope may be straight and rigid, or flexible to bend around curves in the urethra. The doctor may look directly into the cystoscope, or project the image onto a monitor.  Getting ready  · Ask your doctor if you should stop taking any medicines before the procedure.  · Ask whether you should avoid eating or drinking anything after midnight before the procedure.  · Follow any other instructions your doctor gives you.  Tell your doctor before the exam if you:  · Take any medicines, such as aspirin or blood thinners  · Have allergies to any medicines  · Are pregnant   The procedure  Cystoscopy is done in the doctors office, surgery center, or hospital. The doctor and a nurse are present during the procedure. It takes only a few minutes, longer if a biopsy, X-ray, or treatment needs to be done.  During the procedure:  · You lie on an exam table on your back, knees bent and legs apart. You are covered with a drape.  · Your urethra and the area around it are washed. Anesthetic jelly may be applied to numb the urethra. Other pain medicine is usually not needed. In some cases, you may be offered a mild sedative to help you relax. If a more extensive procedure is to be done, such as a biopsy or kidney stone removal, general anesthesia may be needed.  · The cystoscope is inserted. A sterile fluid is put  into the bladder to expand it. You may feel pressure from this fluid.  · When the procedure is done, the cystoscope is removed.  After the procedure  If you had a sedative, general anesthesia, or spinal anesthesia, you must have someone drive you home. Once youre home:  · Drink plenty of fluids.  · You may have burning or light bleeding when you urinate--this is normal.  · Medicines may be prescribed to ease any discomfort or prevent infection. Take these as directed.  · Call your doctor if you have heavy bleeding or blood clots, burning that lasts more than a day, a fever over 100°F  (38° C), or trouble urinating.  Date Last Reviewed: 1/1/2017  © 5943-1031 [employer]. [address] [place] [address], [place] [address]. All rights reserved. This information is not intended as a substitute for professional medical care. Always follow your healthcare professional's instructions.

## 2018-10-15 NOTE — LETTER
October 15, 2018      Sebas Reed MD  [address] [facility] [address]           [facility]  [address]  [facility] [address]  Phone: [phone]          Patient: Edita Riley   MR Number: [id number]   YOB: 1965   Date of Visit: 10/15/2018       Dear Dr. Sebas Reed:    Thank you for referring Edita Riley to me for evaluation. Attached you will find relevant portions of my assessment and plan of care.    If you have questions, please do not hesitate to call me. I look forward to following Edita Riley along with you.    Sincerely,    John Smith MD    Enclosure  CC:  No Recipients    If you would like to receive this communication electronically, please contact [email or web address] or [phone] to request more information on [facility] access.    For providers and/or their staff who would like to refer a patient to [facility], please contact us through our one-stop-shop provider referral line, [facility], at [phone].    If you feel you have received this communication in error or would no longer like to receive these types of communications, please e-mail [email or web address]

## 2018-10-16 ENCOUNTER — TELEPHONE (OUTPATIENT)
Dept: GYNECOLOGIC ONCOLOGY | Facility: CLINIC | Age: 53
End: 2018-10-16

## 2018-10-17 ENCOUNTER — OFFICE VISIT (OUTPATIENT)
Dept: PSYCHIATRY | Facility: CLINIC | Age: 53
End: 2018-10-17
Attending: UROLOGY
Payer: MEDICAID

## 2018-10-17 ENCOUNTER — TELEPHONE (OUTPATIENT)
Dept: INFUSION THERAPY | Facility: HOSPITAL | Age: 53
End: 2018-10-17

## 2018-10-17 DIAGNOSIS — F33.3 SEVERE RECURRENT MAJOR DEPRESSION WITH PSYCHOTIC FEATURES: Primary | ICD-10-CM

## 2018-10-17 DIAGNOSIS — F41.1 GENERALIZED ANXIETY DISORDER: ICD-10-CM

## 2018-10-17 DIAGNOSIS — F43.10 PTSD (POST-TRAUMATIC STRESS DISORDER): ICD-10-CM

## 2018-10-17 PROCEDURE — 99212 OFFICE O/P EST SF 10 MIN: CPT | Mod: PBBFAC | Performed by: PSYCHOLOGIST

## 2018-10-17 PROCEDURE — 90791 PSYCH DIAGNOSTIC EVALUATION: CPT | Mod: PBBFAC | Performed by: PSYCHOLOGIST

## 2018-10-17 PROCEDURE — 99999 PR PBB SHADOW E&M-EST. PATIENT-LVL II: CPT | Mod: PBBFAC,,, | Performed by: PSYCHOLOGIST

## 2018-10-17 PROCEDURE — 90791 PSYCH DIAGNOSTIC EVALUATION: CPT | Mod: HP,HB,S$PBB, | Performed by: PSYCHOLOGIST

## 2018-10-17 NOTE — LETTER
October 17, 2018        Refugio Lemon MD  [address] [facility] [address]             [facility]  [address] [facility] [address]  Phone: [phone]  Fax: [phone]   Patient: Edita Riley   MR Number: [id number]   YOB: 1965   Date of Visit: 10/17/2018       Dear Dr. Lemon:    Thank you for referring Edita Riley to me for evaluation. Below are the relevant portions of my assessment and plan of care.    Edita Riley is a 53 y.o. female referred by Refugio Lemon MD for psychological evaluation and treatment.  Ms. Riley appears to be coping with difficulty with her diagnosis and proposed treatment course due to her pre-existing mental health challenges (severe major depression with psychotic features, severe anxiety, PTSD) and bereavement related to the suicide death of her daughter in March 2018. She is interested in CBT to learn coping strategies and will follow up with me for that purpose.  She was very strongly encouraged to see a psychiatrist for medication management due to her ongoing auditory hallucinations and suicidal ideation. She does not currently meet criteria for PEC.  She is reluctant to see psychiatry, but her  is supportive of consultation. Patient and her  were reminded of the availability of crisis support, if needed, through calling 911 or going to the nearest ED.      If you have questions, please do not hesitate to call me. I look forward to following Edita along with you.    Sincerely,      Enoc Martinez, PhD           CC  No Recipients
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
## 2018-10-18 DIAGNOSIS — F51.01 PRIMARY INSOMNIA: ICD-10-CM

## 2018-10-18 DIAGNOSIS — K21.00 GASTROESOPHAGEAL REFLUX DISEASE WITH ESOPHAGITIS: ICD-10-CM

## 2018-10-18 RX ORDER — OMEPRAZOLE 40 MG/1
40 CAPSULE, DELAYED RELEASE ORAL DAILY
Qty: 90 CAPSULE | Refills: 3 | Status: SHIPPED | OUTPATIENT
Start: 2018-10-18 | End: 2019-04-15 | Stop reason: SDUPTHER

## 2018-10-18 RX ORDER — TRAZODONE HYDROCHLORIDE 50 MG/1
50 TABLET ORAL NIGHTLY
Qty: 90 TABLET | Refills: 3 | Status: SHIPPED | OUTPATIENT
Start: 2018-10-18 | End: 2019-11-13 | Stop reason: SDUPTHER

## 2018-10-19 ENCOUNTER — HOSPITAL ENCOUNTER (OUTPATIENT)
Dept: UROLOGY | Facility: HOSPITAL | Age: 53
Discharge: HOME | End: 2018-10-19
Attending: UROLOGY
Payer: MEDICAID

## 2018-10-19 ENCOUNTER — TELEPHONE (OUTPATIENT)
Dept: GYNECOLOGIC ONCOLOGY | Facility: CLINIC | Age: 53
End: 2018-10-19

## 2018-10-19 VITALS
RESPIRATION RATE: 18 BRPM | HEART RATE: 100 BPM | WEIGHT: 233.69 LBS | BODY MASS INDEX: 34.61 KG/M2 | TEMPERATURE: 98 F | HEIGHT: 69 IN | DIASTOLIC BLOOD PRESSURE: 74 MMHG | SYSTOLIC BLOOD PRESSURE: 149 MMHG

## 2018-10-19 DIAGNOSIS — C77.9 METASTATIC SQUAMOUS CELL CARCINOMA TO LYMPH NODE: ICD-10-CM

## 2018-10-19 DIAGNOSIS — Z85.41 HISTORY OF CERVICAL CANCER: ICD-10-CM

## 2018-10-19 PROCEDURE — 52000 CYSTOURETHROSCOPY: CPT | Mod: ,,, | Performed by: UROLOGY

## 2018-10-19 PROCEDURE — 52000 CYSTOURETHROSCOPY: CPT

## 2018-10-19 RX ORDER — LIDOCAINE HYDROCHLORIDE 20 MG/ML
JELLY TOPICAL
Status: COMPLETED | OUTPATIENT
Start: 2018-10-19 | End: 2018-10-19

## 2018-10-19 RX ORDER — AMOXICILLIN AND CLAVULANATE POTASSIUM 875; 125 MG/1; MG/1
1 TABLET, FILM COATED ORAL ONCE
Status: COMPLETED | OUTPATIENT
Start: 2018-10-19 | End: 2018-10-19

## 2018-10-19 RX ADMIN — AMOXICILLIN AND CLAVULANATE POTASSIUM 1 TABLET: 875; 125 TABLET, FILM COATED ORAL at 03:10

## 2018-10-19 RX ADMIN — LIDOCAINE HYDROCHLORIDE: 20 JELLY TOPICAL at 02:10

## 2018-10-19 NOTE — PROCEDURES
10/19/2018     Pre Procedure Diagnosis: Metastatic squamous cell carcinoma of unknown primary    Post Procedure Diagnosis: same    Anesthesia: 10 cc 2% lidocaine jelly applied per urethra.    14 FR Flexible Olympus cystoscope used.    FINDINGS: No obvious bladder or urethral mucosal lesion    Specimens: None    The patient was taken to the cystoscopy suite and placed in supine position with legs in frog legged position.  The genitalia was prepped and draped  in the usual sterile fashion.  Two percent lidocaine jelly was inserted in the urethra.  After sufficent time had passed to allow good local anesthesia, the cystoscope was inserted in the urethra.     The dome, anterior, posterior and lateral walls were examined systematically.  The ureteral orifices  in their usual position and configuration.  The cystoscope was turned upon itself 180 degrees to visualize the bladder neck. There was no obvious mucosal lesion to account for metastatic squamous cell carcinoma. The cystoscope was then brought to the level of the bladder neck, the water was turned on and the urethra was visualized.  The cystoscope was removed and the patient was instructed to urinate proir to leaving the office.     ASSESSMENT/PLAN: Patient status post flexible cystoscopy.  1. Push fluids for 24 hours.  2. May see blood in the urine, this should gradually improve over the next 2-3 days.  3. The patient was instructed to return to the office or go to the emergency should fever, chills, cloudy urine, or inability to urinate develop.  4. Follow up as needed.

## 2018-10-19 NOTE — TELEPHONE ENCOUNTER
----- Message from Refugio Lemon MD sent at 10/19/2018  3:04 PM CDT -----  Please send normal pap letter. Repeat in 1 year.

## 2018-10-19 NOTE — PATIENT INSTRUCTIONS
What to Expect After a Cystoscopy  For the next 24-48 hours, you may feel a mild burning when you urinate. This burning is normal and expected. Drink plenty of water to dilute the urine to help relieve the burning sensation. You may also see a small amount of blood in your urine after the procedure.    Unless you are already taking antibiotics, you may be given an antibiotic after the test to prevent infection.    Signs and Symptoms to Report  Call the [facility] at [phone] if you develop any of the following:  · Fever of 101 degrees or higher  · Chills or persistent bleeding  · Inability to urinate

## 2018-10-23 ENCOUNTER — TELEPHONE (OUTPATIENT)
Dept: INFUSION THERAPY | Facility: HOSPITAL | Age: 53
End: 2018-10-23

## 2018-10-24 ENCOUNTER — INITIAL CONSULT (OUTPATIENT)
Dept: RADIATION ONCOLOGY | Facility: CLINIC | Age: 53
End: 2018-10-24
Payer: MEDICAID

## 2018-10-24 VITALS
HEIGHT: 69 IN | WEIGHT: 235 LBS | HEART RATE: 101 BPM | RESPIRATION RATE: 18 BRPM | BODY MASS INDEX: 34.8 KG/M2 | DIASTOLIC BLOOD PRESSURE: 67 MMHG | SYSTOLIC BLOOD PRESSURE: 140 MMHG | TEMPERATURE: 97 F

## 2018-10-24 DIAGNOSIS — C77.9 METASTATIC SQUAMOUS CELL CARCINOMA TO LYMPH NODE: ICD-10-CM

## 2018-10-24 DIAGNOSIS — Z85.41 HISTORY OF CERVICAL CANCER: Primary | ICD-10-CM

## 2018-10-24 PROCEDURE — 99204 OFFICE O/P NEW MOD 45 MIN: CPT | Mod: S$PBB,,, | Performed by: RADIOLOGY

## 2018-10-24 PROCEDURE — 99999 PR PBB SHADOW E&M-EST. PATIENT-LVL IV: CPT | Mod: PBBFAC,,, | Performed by: RADIOLOGY

## 2018-10-24 PROCEDURE — 99214 OFFICE O/P EST MOD 30 MIN: CPT | Mod: PBBFAC | Performed by: RADIOLOGY

## 2018-10-24 NOTE — LETTER
October 25, 2018      Refugio Lemon MD  [address]  [facility] [address]           [facility]  [address]  [facility] [address]  Phone: [phone]          Patient: Edita Riley   MR Number: [id number]   YOB: 1965   Date of Visit: 10/24/2018       Dear Dr. Refugio Lemon:    Thank you for referring Edita Riley to me for evaluation. Attached you will find relevant portions of my assessment and plan of care.    If you have questions, please do not hesitate to call me. I look forward to following Edita Riley along with you.    Sincerely,    Kristan Fagan MD    Enclosure  CC:  No Recipients    If you would like to receive this communication electronically, please contact [email or web address] or [phone] to request more information on [employer] access.    For providers and/or their staff who would like to refer a patient to [facility], please contact us through our one-stop-shop provider referral line, [facility], at [phone].    If you feel you have received this communication in error or would no longer like to receive these types of communications, please e-mail [email or web address]

## 2018-10-24 NOTE — PROGRESS NOTES
REFERRING PHYSICIAN:   Refugio Lemon M.D., Sebas Reed MD    DIAGNOSIS:  History of cervical cancer now with pelvic lymph node recurrence    HISTORY OF PRESENT ILLNESS:   Ms. Riley is a 53-year-old female with history fibromyalgia, stroke at the age of 7, and cervical cancer status post hysterectomy in 2015 in [place], [place] who was recently found to have a pelvic lymph node recurrence after evaluation for generalized malaise and abdominal pain.  Workup with CT of the abdomen and pelvis on July 24, 2018 revealed an enlarged lymph node at the bifurcation of the right common iliac measuring 2 cm.  There was also mild enlargement of the liver with hypoattenuation suggestive of steatosis.  A PET scan on August 31, 2018 revealed hypermetabolic activity associated with the right iliac lymph node with SUV max of 10.  There are low-grade axillary and inguinal lymph nodes noted with SUV max less than 2.  There is no signs of metastatic disease.  On is September 18, 2018, she underwent incisional biopsy of this lesion with pathology revealing metastatic high-grade carcinoma with squamous cell features most likely GYN origin.  A Pap smear on October 12, 2018 is negative for malignancy.  She also underwent evaluation by Urology which was negative for any urological origin.  She is here today for recommendations regarding further treatment.    At present, she reports generalized fatigue secondary to fibromyalgia.  She denies vaginal bleeding or discharge. She also denies fever, night sweats, or weight loss. Regarding her history of stroke, she reports slurred speech, left-sided facial droop and left-sided upper and lower extremity weakness at the age of 7 with residual left-sided facial droop and left lower extremity weakness.  Of note, she also has history of depression related to death of her daughter earlier this year by suicide.  Dr. Lemon has already placed an ambulatory consultation to psychiatry for her.    REVIEW  OF SYSTEMS:  As above.  In addition, patient denies headaches, visual problems, dizziness, chest pain, shortness of breath, cough, nausea, vomiting, diarrhea, or any new bony pains. Patient also denies easy bruising, skin rashes, or numbness or tingling.    GYN HISTORY:      ECO    PAST MEDICAL HISTORY:  Past Medical History:   Diagnosis Date    Anxiety     Cervical cancer     Chronic back pain     Depression     Fibromyalgia     GERD (gastroesophageal reflux disease)     Hiatal hernia     History of cervical cancer 10/11/2018    Hx of psychiatric care     Cymbalta, trazodone    Hypertension     Lactose intolerance     Metastatic squamous cell carcinoma to lymph node 10/11/2018    Osteoarthritis of back     Psychiatric problem     Stroke 1972       PAST SURGICAL HISTORY:  Past Surgical History:   Procedure Laterality Date    BILATERAL OOPHORECTOMY      CHOLECYSTECTOMY  2016    Done at [facility], path showed chronic cholecystitis and gallstones    CHOLECYSTECTOMY-LAPAROSCOPIC N/A 2016    Performed by Joshua Goldberg, [facility] at [facility] OR 2ND FLR    cold knife conization      COLONOSCOPY      COLONOSCOPY N/A 10/24/2016    at [facility], Dr Thomas    COLONOSCOPY N/A 2018    Procedure: COLONOSCOPY;  Surgeon: Arden Gutiérrez MD;  Location: [facility] (4TH FLR);  Service: Endoscopy;  Laterality: N/A;    COLONOSCOPY N/A 2018    Performed by Arden Gutiérrez MD at [facility] (4TH FLR)    COLONOSCOPY N/A 10/24/2016    Performed by Arden Gutiérrez MD at [facility] (4TH FLR)    ESOPHAGOGASTRODUODENOSCOPY      ESOPHAGOGASTRODUODENOSCOPY (EGD) N/A 10/24/2016    Performed by Arden Gutiérrez MD at [facility] (4TH FLR)    HYSTERECTOMY      [facility] for [facility] cancer    LYMPHADENECTOMY, RETROPERITONEUM Right 2018    Performed by Sebas Reed MD at [facility] OR 2ND FLR    OOPHORECTOMY      RETROPERITONEAL LYMPHADENECTOMY Right 2018    Procedure:  LYMPHADENECTOMY, RETROPERITONEUM;  Surgeon: Sebas Reed MD;  Location: [facility] OR [address] [place] [address];  Service: General;  Laterality: Right;  ILIAC       ALLERGIES:   Review of patient's allergies indicates:   Allergen Reactions    Bee sting [allergen ext-venom-honey bee]      Rash      Grass pollen-bermuda, standard      rash       MEDICATIONS:  Current Outpatient Medications   Medication Sig    arm brace (NEOPRENE WRIST SPLINT SUPPORT) Misc 1 Units by Misc.(Non-Drug; Combo Route) route every evening.    cholecalciferol, vitamin D3, 2,000 unit Cap Take 1 capsule by mouth once daily.    conjugated estrogens (PREMARIN) vaginal cream Place 0.5 g vaginally twice a week.    cyclobenzaprine (FLEXERIL) 10 MG tablet TAKE 1 TABLET (10 MG TOTAL) BY MOUTH NIGHTLY AS NEEDED FOR MUSCLE SPASMS.    DULoxetine (CYMBALTA) 60 MG capsule TAKE 1 CAPSULE (60 MG TOTAL) BY MOUTH ONCE DAILY.    gabapentin (NEURONTIN) 300 MG capsule Take 2 capsules (600 mg total) by mouth 2 (two) times daily.    lisinopril 10 MG tablet TAKE 1 TABLET (10 MG TOTAL) BY MOUTH ONCE DAILY.    meclizine (ANTIVERT) 25 mg tablet Take 1 tablet (25 mg total) by mouth 3 (three) times daily as needed for Dizziness.    omeprazole (PRILOSEC) 40 MG capsule Take 1 capsule (40 mg total) by mouth once daily.    oxyCODONE-acetaminophen (PERCOCET) 5-325 mg per tablet Take 1 tablet by mouth every 4 (four) hours as needed.    sucralfate (CARAFATE) 1 gram tablet Take 1 tablet (1 g total) by mouth 2 (two) times daily.    traMADol (ULTRAM) 50 mg tablet Take 50 mg by mouth every 6 (six) hours as needed for Pain.    traZODone (DESYREL) 50 MG tablet Take 1 tablet (50 mg total) by mouth every evening.    estradiol (ESTRACE) 1 MG tablet Take 1 mg by mouth once daily.     No current facility-administered medications for this visit.        SOCIAL HISTORY:  Social History     Socioeconomic History    Marital status:      Spouse name: Hammad    Number of children: 2     "Years of education: Not on file    Highest education level: Not on file   Social Needs    Financial resource strain: Not on file    Food insecurity - worry: Not on file    Food insecurity - inability: Not on file    Transportation needs - medical: Not on file    Transportation needs - non-medical: Not on file   Occupational History    Not on file   Tobacco Use    Smoking status: Never Smoker    Smokeless tobacco: Never Used   Substance and Sexual Activity    Alcohol use: No     Alcohol/week: 0.0 oz    Drug use: No    Sexual activity: Yes     Partners: Male     Birth control/protection: None     Comment:  19 years since    Other Topics Concern    Patient feels they ought to cut down on drinking/drug use Not Asked    Patient annoyed by others criticizing their drinking/drug use Not Asked    Patient has felt bad or guilty about drinking/drug use Not Asked    Patient has had a drink/used drugs as an eye opener in the AM Not Asked   Social History Narrative    , twin daughters (1  2018), disabled due to childhood stroke, Christian sophia       FAMILY HISTORY:  Family History   Problem Relation Age of Onset    Heart disease Sister     Heart disease Maternal Grandmother     Colon cancer Father     Esophageal cancer Father     Cancer Father         Lung-smoker    Cancer Mother         Cervical    Cervical cancer Mother     Breast cancer Maternal Aunt     Suicide Daughter         jumped from parking structure    Drug abuse Daughter     Drug abuse Daughter     Rectal cancer Neg Hx     Stomach cancer Neg Hx     Crohn's disease Neg Hx     Ulcerative colitis Neg Hx     Diabetes Neg Hx     Hypertension Neg Hx          PHYSICAL EXAMINATION:  Vitals:    10/24/18 0938   BP: (!) 140/67   Pulse: 101   Resp: 18   Temp: 97.1 °F (36.2 °C)   TempSrc: Oral   Weight: 106.6 kg (235 lb)   Height: 5' 9" (1.753 m)   Body mass index is 34.7 kg/m².  GENERAL: Patient is alert " and oriented, in no acute distress.  HEENT:Extraocular muscles are intact.  Oropharynx is clear without lesions.  There is no cervical or supraclavicular lymphadenopathy palpated.  No thyromegaly noted.  HEART: Regular rate and rhythm.  LUNGS: Clear to auscultation bilaterally.  ABDOMEN:  Abdomen is obese.  Soft, nontender, nondistended, without hepatosplenomegaly.  Normoactive bowel sounds.  PELVIC EXAM:  A speculum examination reveals intact vaginal cuff with no suspicious lesions.  EXTREMITIES: No clubbing, cyanosis, or edema.  NEUROLOGICAL: Cranial nerve II through XII grossly intact.  Sensation is intact.  Strength is 5 out of 5 in the upper and lower extremities bilaterally.     ASSESSMENT:   This is a 53-year-old female with history of cervical cancer in 2015 who underwent hysterectomy at an outside facility, now with pelvic lymphadenopathy who underwent incisional biopsy on September 18, 2018 with pathology revealing high-grade carcinoma with squamous cell features.    PLAN:   After discussion with the patient and her , they might have some records from her previous diagnosis at home.  She is therefore requested to bring them in for my review.  A records release form was also signed to obtain any information regarding her diagnosis of cervical cancer and treatment in 2015 from [place], [place] (Dr. Wilder?).  She reports that she did not get chemotherapy or radiation after hysterectomy that time.  Therefore, given the pelvic lymph node recurrence, I recommend whole pelvic radiation with boost to the hypermetabolic lymph robert area with additional boost to the vaginal cuff.  I plan to deliver 4500 cGy to pelvis with 500 cGy boost. I plan to use IMRT technique for the external beam radiation to spare small bowel, bladder, rectum, and other normal structures.  She is unsure about plans for chemotherapy.  I plan to discuss that with Dr. Lemon.    The risks, benefits, and side effects of radiation were  explained in detail to the patient.  All questions were answered and informed consent was signed.  I plan to see the patient back for radiation planning CT.    Psychosocial Distress screening score of Distress Score: 9 noted and reviewed. A referral to  Psychologist already initiated by Dr. Lemon per his note.    I spent approximately 60 minutes reviewing the available records and evaluating the patient, out of which over 50% of the time was spent face to face with the patient in counseling and coordinating this patient's care.

## 2018-10-24 NOTE — PATIENT INSTRUCTIONS
Radiation Therapy Treatment  Radiation therapy can help you in your fight against cancer. It begins with a session to discuss treatment with your doctor. If you and your doctor decide on radiation, you will return for a simulation. The simulation is a planning session that helps the doctor target your cancer. He or she will design a radiation plan to protect normal tissues. When the simulation and plan are completed, you will begin your daily treatments. Treatment is usually once daily Monday to Friday. It takes less than a half an hour. Sometimes you may need radiation twice a day, with usually 6 hours between treatments. After the course of radiation is complete, you will be scheduled for follow-up appointments. This is to make sure the cancer is under control. The follow-ups will also make sure that any side effects from the treatment are taken care of.  Radiation therapy uses high-energy X-rays to kill cancer cells.   Your treatment planning visit: The simulation  Your radiation therapy team uses a special machine called a simulator to map out your treatment. The simulator is usually an X-ray machine (fluoroscopy), CT scanner, MRI scanner, or PET-CT scanner machine. Laser lights act as guides to help position your body accurately. During this visit:  · The team figures out the best position for your body. They make notes in your chart so youll be placed the same way each time.  · You may use special devices to keep your body correctly positioned and still during treatment. These may include molds, masks, rests, and blocks.  · The team makes ink marks on your skin. These will help you get in the same position for each treatment. Tiny permanent tattoos may also be used.   · Markers such as metal balls or wires may be put on or in your body. Sometime these are taped to the skin to help with the imaging process. These work with the X-rays to position your body. The markers are removed when the visit is  over.  After the team has the imaging and data, the information is sent into the computer planning system. Your doctor and the team of physicists and dosimetrists design a treatment field. The field will best target your cancer and how it might spread. It will also help limit radiation to nearby normal tissues.  Your treatments  Each treatment usually takes 10 to 30 minutes. You may need to change into a hospital gown. The radiation therapist puts you in the correct position on the treatment table, then leaves the room. Sometimes you may need more imaging before each treatment. The machine may take digital X-rays or a CT scan to help make sure you are lined up correctly. During treatment, lie as still as you can and breathe normally. You will hear noises coming from the machine. You can talk to the radiation therapist, who watches you from the control room on a TV monitor. After treatment, the therapist will help you off the table. You can then get dressed and go back to your normal activities.  Date Last Reviewed: 1/14/2016 © 2000-2017 [employer]. [address] [place] [address]. All rights reserved. This information is not intended as a substitute for professional medical care. Always follow your healthcare professional's instructions.        Discharge Instructions for Radiation Therapy  Radiation therapy uses high-energy X-rays to kill cancer cells and help you in your fight against cancer. Radiation destroys cancer cells gradually, over time. The goal of therapy is to focus on and kill as many cancer cells as possible. Radiation can also damage or kill some of the normal cells that are closest to the tumor. Damaged normal cells can repair themselves, often within a few days.  Caring for your skin  You should ask your healthcare provider for specific products that he or she recommends for washing and bathing. In general, use a mild nondetergent soap and warm (not hot) water to clean the  "area receiving radiation. Pat the region dry rather than rubbing.  Your healthcare provider may give you products to moisturize the skin and prevent infection. The goal is to prevent cracks or breaks in the skin that may be sensitive from treatment:   · Dont be surprised if your treatment causes skin redness, and a type of "sunburn" over time. Some radiation treatments can cause this.   · Ask your therapy team what lotion to use. Also ask for directions about when and how to apply it.  · Avoid prolonged or direct sunlight on the treated area. Ask your therapy team about using a sunscreen. You do not have to avoid going outside altogether, but must take appropriate precautions.  · Dont remove ink marks unless your radiation therapist says its OK. Dont scrub or use soap on the marks when you wash. Let water run over them and pat them dry.  · Protect your skin from heat or cold. Avoid hot tubs, saunas, heating pads, or ice packs.  · Avoid clothing that causes friction or rubbing on the skin.  Fighting fatigue  Radiation therapy may cause you to feel tired. Your body is working hard to heal and repair itself. To feel better, try these things:  · Do light exercise each day. Take short walks.  · Plan tasks for the times when you tend to have the most energy. Ask for help when you need it.  · Relax before you go to bed. This will help you sleep better. Try reading or listening to soothing music.  Coping with appetite changes  Here are ways to cope:  · Tell your therapy team if you find it hard to eat or you have no appetite. You may be referred to a nutritionist to help you with meal planning.  · Radiation to certain internal sites can cause nausea, depending on the location of treatment. This can affect your appetite. Think of healthy eating as part of your treatment. Try these tips:  ¨ Eat slowly.  ¨ Eat small meals several times a day.  ¨ Eat more food when youre feeling better.  ¨ Ask others to keep you company " when you eat.  ¨ Stock up on easy-to-prepare foods.  ¨ Eat foods high in protein and calories. Your healthcare provider may recommend liquid meal supplements.  ¨ Drink plenty of water and other fluids.  ¨ Ask your healthcare provider before taking any vitamins or over-the-counter supplements. Such products are not regulated by the FDA and can sometimes interfere with your treatments.   Dealing with other side effects  Here are suggestions to deal with other side effects:   · Be prepared for hair loss in the area being treated. The hair loss may be permanent. Be sure to discuss this with your healthcare provider.  · Sip cool water if your mouth or throat becomes dry or sore. Ice chips may also help.  · Tell your healthcare provider if you have diarrhea or constipation. You may be given a special diet.  · If you have trouble swallowing liquids, tell your healthcare provider.  Follow-up  Make a follow-up appointment as directed by your healthcare provider.     When to call your healthcare provider  Call your healthcare provider right away if you have any of the following:  · Unexpected headaches  · Trouble concentrating  · Ongoing fatigue  · Wheezing, shortness of breath, or trouble breathing  · Pain that doesnt go away, especially if its always in the same place  · New or unusual lumps, bumps, or swelling  · Dizziness or lightheadedness  · Unusual rashes, bruises, or bleeding  · Fever of 100.4°F (38°C) or higher, or chills  · Nausea and vomiting  · Diarrhea that doesnt improve with time  · Skin breakdown; significant pain due to skin irritation   Date Last Reviewed: 1/13/2016  © 6912-3508 [employer]. [address] [place] [address], [place] [address]. All rights reserved. This information is not intended as a substitute for professional medical care. Always follow your healthcare professional's instructions.        Radiation Therapy: Managing Short-Term Side Effects     Take short walks daily.   Radiation  therapy uses high-energy X-rays or particles to kill cancer cells. Some normal cells can also be affected. This causes side effects such as dry skin, tiredness (fatigue), or changes in your appetite. Most side effects go away when your radiation therapy is over.  Having side effects of radiation therapy does not mean that your cancer is getting worse or that therapy isnt working.   Caring for your skin  Skin problems may happen where your body gets radiation. Your skin may become dry, itchy, red, and peeling. It may darken in that spot, like a tan. To care for your skin:  · Dont scrub on the treatment area. Clean that area of the skin every day. Use warm water and mild soap, or as your healthcare provider advises. Pat the skin afterward or let it air dry.  · Ask your therapy team what lotion to use and when to use it.  · Keep the treated area out of the sun. Ask your team about using a sunscreen.  · Don't remove ink marks unless your radiation therapist says you can. Dont scrub the marks when you wash. Let water run over them and pat them dry.  · Protect your skin from heat or cold. Avoid hot tubs, saunas, hot pads, and ice packs.  · Wear soft, loose clothing to avoid rubbing skin.  Fighting tiredness  The cancer itself or the radiation therapy may cause you to feel tired. Your body is working hard to heal and repair itself. To feel better:  · Try light exercise each day. Take short walks.  · Plan tasks for the times when you tend to have the most energy. Ask for help when you need it.  · Relax before you go to bed to sleep better. Try reading or listening to soothing music.  · Be sure to let your cancer care team know if you continue to have fatigue that is not getting better. They may be able to offer ways to help.   Coping with appetite changes  Tell your therapy team if you find it hard to eat or have no appetite. You may need to see a nutritionist. This is a healthcare provider with special training in meal  planning. To keep your strength up, you need to eat well and maintain your weight. Think of healthy eating as part of your treatment. Try these tips:  · Eat slowly.  · Eat small meals several times a day.  · Eat more food when youre feeling better, even if it is not mealtime.  · Ask others to keep you company when you eat.  · Stock up on easy-to-prepare foods.  · Eat foods high in protein and calories.  · Drink plenty of water and other fluids.  · Ask your healthcare provider before taking any vitamins.  Site-specific side effects  These side effects include the following:   · You may lose hair in the area being treated. The hair often grows back after treatment.  · Your mouth or throat can become dry or sore if your head or neck is being treated. Sip cool water to help ease discomfort.  · Nausea and bowel changes can happen with radiation to the pelvic region. Tell your healthcare provider if you have nausea, diarrhea, or constipation. You may need to take medicine or follow a special diet.  Talk with your healthcare team  Radiation therapy can also have other side effects, including some that might not show up until years later. Be sure to talk with your healthcare team about what to expect with the type of radiation therapy you are getting, including when you should call them with concerns.   Date Last Reviewed: 5/1/2016  © 4316-6680 [employer]. [address] [place] [address], [place] [address]. All rights reserved. This information is not intended as a substitute for professional medical care. Always follow your healthcare professional's instructions.      Return for ct/ism as scheduled.

## 2018-10-29 ENCOUNTER — HOSPITAL ENCOUNTER (OUTPATIENT)
Dept: RADIATION THERAPY | Facility: HOSPITAL | Age: 53
Discharge: HOME | End: 2018-10-29
Attending: RADIOLOGY
Payer: MEDICAID

## 2018-10-29 PROCEDURE — 77263 THER RADIOLOGY TX PLNG CPLX: CPT | Mod: ,,, | Performed by: RADIOLOGY

## 2018-10-29 PROCEDURE — 77290 THER RAD SIMULAJ FIELD CPLX: CPT | Mod: 26,,, | Performed by: RADIOLOGY

## 2018-10-29 PROCEDURE — 77290 THER RAD SIMULAJ FIELD CPLX: CPT | Mod: TC | Performed by: RADIOLOGY

## 2018-10-29 PROCEDURE — 77334 RADIATION TREATMENT AID(S): CPT | Mod: 26,,, | Performed by: RADIOLOGY

## 2018-10-29 PROCEDURE — 77334 RADIATION TREATMENT AID(S): CPT | Mod: TC | Performed by: RADIOLOGY

## 2018-10-29 PROCEDURE — 77014 HC CT GUIDANCE RADIATION THERAPY FLDS PLACEMENT: CPT | Mod: TC | Performed by: RADIOLOGY

## 2018-10-30 DIAGNOSIS — C77.9 METASTATIC SQUAMOUS CELL CARCINOMA TO LYMPH NODE: Primary | ICD-10-CM

## 2018-11-01 ENCOUNTER — HOSPITAL ENCOUNTER (OUTPATIENT)
Dept: RADIATION THERAPY | Facility: HOSPITAL | Age: 53
Discharge: HOME | End: 2018-11-01
Attending: RADIOLOGY
Payer: MEDICAID

## 2018-11-05 ENCOUNTER — LAB VISIT (OUTPATIENT)
Dept: LAB | Facility: HOSPITAL | Age: 53
End: 2018-11-05
Payer: MEDICAID

## 2018-11-05 ENCOUNTER — CLINICAL SUPPORT (OUTPATIENT)
Dept: HEMATOLOGY/ONCOLOGY | Facility: CLINIC | Age: 53
End: 2018-11-05
Payer: MEDICAID

## 2018-11-05 ENCOUNTER — TELEPHONE (OUTPATIENT)
Dept: GYNECOLOGIC ONCOLOGY | Facility: CLINIC | Age: 53
End: 2018-11-05

## 2018-11-05 DIAGNOSIS — C77.9 METASTATIC SQUAMOUS CELL CARCINOMA TO LYMPH NODE: Primary | ICD-10-CM

## 2018-11-05 DIAGNOSIS — C77.9 METASTATIC SQUAMOUS CELL CARCINOMA TO LYMPH NODE: ICD-10-CM

## 2018-11-05 LAB
ANION GAP SERPL CALC-SCNC: 8 MMOL/L
BUN SERPL-MCNC: 12 MG/DL
CALCIUM SERPL-MCNC: 9.4 MG/DL
CHLORIDE SERPL-SCNC: 104 MMOL/L
CO2 SERPL-SCNC: 27 MMOL/L
CREAT SERPL-MCNC: 0.9 MG/DL
ERYTHROCYTE [DISTWIDTH] IN BLOOD BY AUTOMATED COUNT: 14.2 %
EST. GFR  (AFRICAN AMERICAN): >60 ML/MIN/1.73 M^2
EST. GFR  (NON AFRICAN AMERICAN): >60 ML/MIN/1.73 M^2
GLUCOSE SERPL-MCNC: 111 MG/DL
HCT VFR BLD AUTO: 39.9 %
HGB BLD-MCNC: 12.2 G/DL
IMM GRANULOCYTES # BLD AUTO: 0.02 K/UL
MCH RBC QN AUTO: 27.2 PG
MCHC RBC AUTO-ENTMCNC: 30.6 G/DL
MCV RBC AUTO: 89 FL
NEUTROPHILS # BLD AUTO: 6.2 K/UL
PLATELET # BLD AUTO: 317 K/UL
PMV BLD AUTO: 10.7 FL
POTASSIUM SERPL-SCNC: 4.2 MMOL/L
RBC # BLD AUTO: 4.49 M/UL
SODIUM SERPL-SCNC: 139 MMOL/L
WBC # BLD AUTO: 10.6 K/UL

## 2018-11-05 PROCEDURE — 36415 COLL VENOUS BLD VENIPUNCTURE: CPT

## 2018-11-05 PROCEDURE — 80048 BASIC METABOLIC PNL TOTAL CA: CPT

## 2018-11-05 PROCEDURE — 85027 COMPLETE CBC AUTOMATED: CPT

## 2018-11-05 RX ORDER — HEPARIN 100 UNIT/ML
500 SYRINGE INTRAVENOUS
Status: CANCELLED | OUTPATIENT
Start: 2018-11-07

## 2018-11-05 RX ORDER — SODIUM CHLORIDE 0.9 % (FLUSH) 0.9 %
10 SYRINGE (ML) INJECTION
Status: CANCELLED | OUTPATIENT
Start: 2018-11-07

## 2018-11-05 NOTE — TELEPHONE ENCOUNTER
----- Message from Refugio Lemon MD sent at 11/5/2018  5:29 PM CST -----  Labs are good for chemotherapy. Please let her know.

## 2018-11-05 NOTE — PROGRESS NOTES
Patient completed chemo class:  Viewed video presentation, received binder that has medication information specific to the patient, participated in Q&A.    Speakers included the  (Zina) and Dietician (Verónica).   The group was oriented to the [facility]fusion [facility].

## 2018-11-06 DIAGNOSIS — K21.00 GASTROESOPHAGEAL REFLUX DISEASE WITH ESOPHAGITIS: ICD-10-CM

## 2018-11-06 PROCEDURE — 77301 RADIOTHERAPY DOSE PLAN IMRT: CPT | Mod: TC | Performed by: RADIOLOGY

## 2018-11-06 PROCEDURE — 77301 RADIOTHERAPY DOSE PLAN IMRT: CPT | Mod: 26,,, | Performed by: RADIOLOGY

## 2018-11-06 RX ORDER — SUCRALFATE 1 G/1
1 TABLET ORAL 2 TIMES DAILY
Qty: 180 TABLET | Refills: 3 | Status: CANCELLED | OUTPATIENT
Start: 2018-11-06

## 2018-11-07 ENCOUNTER — DOCUMENTATION ONLY (OUTPATIENT)
Dept: RADIATION ONCOLOGY | Facility: CLINIC | Age: 53
End: 2018-11-07

## 2018-11-07 ENCOUNTER — INFUSION (OUTPATIENT)
Dept: INFUSION THERAPY | Facility: HOSPITAL | Age: 53
End: 2018-11-07
Attending: OBSTETRICS & GYNECOLOGY
Payer: MEDICAID

## 2018-11-07 VITALS
TEMPERATURE: 98 F | SYSTOLIC BLOOD PRESSURE: 142 MMHG | DIASTOLIC BLOOD PRESSURE: 66 MMHG | RESPIRATION RATE: 18 BRPM | HEART RATE: 92 BPM

## 2018-11-07 DIAGNOSIS — C77.9 METASTATIC SQUAMOUS CELL CARCINOMA TO LYMPH NODE: Primary | ICD-10-CM

## 2018-11-07 PROCEDURE — 77470 SPECIAL RADIATION TREATMENT: CPT | Mod: 26,59,, | Performed by: RADIOLOGY

## 2018-11-07 PROCEDURE — 77470 SPECIAL RADIATION TREATMENT: CPT | Mod: 59,TC | Performed by: RADIOLOGY

## 2018-11-07 PROCEDURE — 77417 THER RADIOLOGY PORT IMAGE(S): CPT | Performed by: RADIOLOGY

## 2018-11-07 PROCEDURE — 96367 TX/PROPH/DG ADDL SEQ IV INF: CPT

## 2018-11-07 PROCEDURE — 63600175 PHARM REV CODE 636 W HCPCS: Mod: JG | Performed by: OBSTETRICS & GYNECOLOGY

## 2018-11-07 PROCEDURE — 77338 DESIGN MLC DEVICE FOR IMRT: CPT | Mod: TC | Performed by: RADIOLOGY

## 2018-11-07 PROCEDURE — 96413 CHEMO IV INFUSION 1 HR: CPT

## 2018-11-07 PROCEDURE — 77338 DESIGN MLC DEVICE FOR IMRT: CPT | Mod: 26,,, | Performed by: RADIOLOGY

## 2018-11-07 PROCEDURE — 77300 RADIATION THERAPY DOSE PLAN: CPT | Mod: TC | Performed by: RADIOLOGY

## 2018-11-07 PROCEDURE — 96361 HYDRATE IV INFUSION ADD-ON: CPT

## 2018-11-07 PROCEDURE — 77300 RADIATION THERAPY DOSE PLAN: CPT | Mod: 26,,, | Performed by: RADIOLOGY

## 2018-11-07 PROCEDURE — 77386 HC IMRT, COMPLEX: CPT | Performed by: RADIOLOGY

## 2018-11-07 PROCEDURE — 77387 GUIDANCE FOR RADJ TX DLVR: CPT | Mod: ,,, | Performed by: RADIOLOGY

## 2018-11-07 PROCEDURE — 25000003 PHARM REV CODE 250: Performed by: OBSTETRICS & GYNECOLOGY

## 2018-11-07 RX ORDER — SODIUM CHLORIDE 0.9 % (FLUSH) 0.9 %
10 SYRINGE (ML) INJECTION
Status: DISCONTINUED | OUTPATIENT
Start: 2018-11-07 | End: 2018-11-07 | Stop reason: HOSPADM

## 2018-11-07 RX ORDER — HEPARIN 100 UNIT/ML
500 SYRINGE INTRAVENOUS
Status: DISCONTINUED | OUTPATIENT
Start: 2018-11-07 | End: 2018-11-07 | Stop reason: HOSPADM

## 2018-11-07 RX ADMIN — CISPLATIN 91 MG: 100 INJECTION, SOLUTION INTRAVENOUS at 11:11

## 2018-11-07 RX ADMIN — SODIUM CHLORIDE: 0.9 INJECTION, SOLUTION INTRAVENOUS at 09:11

## 2018-11-07 RX ADMIN — DEXAMETHASONE SODIUM PHOSPHATE: 4 INJECTION, SOLUTION INTRA-ARTICULAR; INTRALESIONAL; INTRAMUSCULAR; INTRAVENOUS; SOFT TISSUE at 10:11

## 2018-11-07 RX ADMIN — SODIUM CHLORIDE: 0.9 INJECTION, SOLUTION INTRAVENOUS at 12:11

## 2018-11-07 NOTE — PLAN OF CARE
Problem: Chemotherapy Effects (Adult)  Goal: Signs and Symptoms of Listed Potential Problems Will be Absent, Minimized or Managed (Chemotherapy Effects)  Signs and symptoms of listed potential problems will be absent, minimized or managed by discharge/transition of care (reference Chemotherapy Effects (Adult) CPG).  Outcome: Ongoing (interventions implemented as appropriate)  Does have preexisting conditions. Educated on chemo drug, side effects, and self management. Pt voiced understanding.  Pt did attend chemo class.

## 2018-11-07 NOTE — PLAN OF CARE
Problem: Patient Care Overview  Goal: Plan of Care Review  Outcome: Ongoing (interventions implemented as appropriate)  Day 1 of XRT to the cervix. Nursing education done and handout given and discussed.

## 2018-11-07 NOTE — PLAN OF CARE
Problem: Patient Care Overview  Goal: Plan of Care Review  Outcome: Ongoing (interventions implemented as appropriate)  Tolerated treatment well.  Advised to call MD for any problems or concerns.  AVS given. RTC in 1 week. NAD noted upon discharge.

## 2018-11-08 DIAGNOSIS — K21.00 GASTROESOPHAGEAL REFLUX DISEASE WITH ESOPHAGITIS: ICD-10-CM

## 2018-11-08 PROCEDURE — 77386 HC IMRT, COMPLEX: CPT | Performed by: RADIOLOGY

## 2018-11-08 PROCEDURE — 77387 GUIDANCE FOR RADJ TX DLVR: CPT | Mod: ,,, | Performed by: RADIOLOGY

## 2018-11-08 RX ORDER — SUCRALFATE 1 G/1
1 TABLET ORAL 2 TIMES DAILY
Qty: 180 TABLET | Refills: 3 | Status: CANCELLED | OUTPATIENT
Start: 2018-11-08 | End: 2019-11-08

## 2018-11-08 NOTE — TELEPHONE ENCOUNTER
----- Message from Richie Anne sent at 11/8/2018 10:39 AM CST -----  Contact: Pharmacy [facility] [phone]   RX request - refill or new RX.  Is this a refill or new RX:  Refill  RX name and strength: sucralfate (CARAFATE) 1 gram tablet     Pharmacy name and phone #: [facility]/PH[facility]ACY #[id number] - [place], [place] - [address]    Please call an advise  Thank you

## 2018-11-09 DIAGNOSIS — K21.00 GASTROESOPHAGEAL REFLUX DISEASE WITH ESOPHAGITIS: ICD-10-CM

## 2018-11-09 PROCEDURE — 77387 GUIDANCE FOR RADJ TX DLVR: CPT | Mod: ,,, | Performed by: RADIOLOGY

## 2018-11-09 PROCEDURE — 77386 HC IMRT, COMPLEX: CPT | Performed by: RADIOLOGY

## 2018-11-09 RX ORDER — SUCRALFATE 1 G/1
1 TABLET ORAL 2 TIMES DAILY
Qty: 180 TABLET | Refills: 3 | OUTPATIENT
Start: 2018-11-09

## 2018-11-11 RX ORDER — GABAPENTIN 300 MG/1
300 CAPSULE ORAL 2 TIMES DAILY
Qty: 180 CAPSULE | Refills: 3 | Status: SHIPPED | OUTPATIENT
Start: 2018-11-11 | End: 2019-09-03 | Stop reason: SDUPTHER

## 2018-11-12 ENCOUNTER — LAB VISIT (OUTPATIENT)
Dept: LAB | Facility: HOSPITAL | Age: 53
End: 2018-11-12
Payer: MEDICAID

## 2018-11-12 DIAGNOSIS — C77.9 METASTATIC SQUAMOUS CELL CARCINOMA TO LYMPH NODE: ICD-10-CM

## 2018-11-12 LAB
ANION GAP SERPL CALC-SCNC: 7 MMOL/L
BUN SERPL-MCNC: 13 MG/DL
CALCIUM SERPL-MCNC: 9.6 MG/DL
CHLORIDE SERPL-SCNC: 100 MMOL/L
CO2 SERPL-SCNC: 28 MMOL/L
CREAT SERPL-MCNC: 1.2 MG/DL
ERYTHROCYTE [DISTWIDTH] IN BLOOD BY AUTOMATED COUNT: 13.9 %
EST. GFR  (AFRICAN AMERICAN): 58.8 ML/MIN/1.73 M^2
EST. GFR  (NON AFRICAN AMERICAN): 51 ML/MIN/1.73 M^2
GLUCOSE SERPL-MCNC: 166 MG/DL
HCT VFR BLD AUTO: 39.7 %
HGB BLD-MCNC: 12.8 G/DL
IMM GRANULOCYTES # BLD AUTO: 0.02 K/UL
MCH RBC QN AUTO: 27.4 PG
MCHC RBC AUTO-ENTMCNC: 32.2 G/DL
MCV RBC AUTO: 85 FL
NEUTROPHILS # BLD AUTO: 6 K/UL
PLATELET # BLD AUTO: 310 K/UL
PMV BLD AUTO: 9.9 FL
POTASSIUM SERPL-SCNC: 3.8 MMOL/L
RBC # BLD AUTO: 4.67 M/UL
SODIUM SERPL-SCNC: 135 MMOL/L
WBC # BLD AUTO: 8.26 K/UL

## 2018-11-12 PROCEDURE — 77386 HC IMRT, COMPLEX: CPT | Performed by: RADIOLOGY

## 2018-11-12 PROCEDURE — 36415 COLL VENOUS BLD VENIPUNCTURE: CPT

## 2018-11-12 PROCEDURE — 80048 BASIC METABOLIC PNL TOTAL CA: CPT

## 2018-11-12 PROCEDURE — 85027 COMPLETE CBC AUTOMATED: CPT

## 2018-11-12 PROCEDURE — 77387 GUIDANCE FOR RADJ TX DLVR: CPT | Mod: ,,, | Performed by: RADIOLOGY

## 2018-11-12 RX ORDER — SODIUM CHLORIDE 0.9 % (FLUSH) 0.9 %
10 SYRINGE (ML) INJECTION
Status: CANCELLED | OUTPATIENT
Start: 2018-11-14

## 2018-11-12 RX ORDER — HEPARIN 100 UNIT/ML
500 SYRINGE INTRAVENOUS
Status: CANCELLED | OUTPATIENT
Start: 2018-11-14

## 2018-11-13 ENCOUNTER — DOCUMENTATION ONLY (OUTPATIENT)
Dept: RADIATION ONCOLOGY | Facility: CLINIC | Age: 53
End: 2018-11-13

## 2018-11-13 ENCOUNTER — TELEPHONE (OUTPATIENT)
Dept: GYNECOLOGIC ONCOLOGY | Facility: CLINIC | Age: 53
End: 2018-11-13

## 2018-11-13 PROCEDURE — 77386 HC IMRT, COMPLEX: CPT | Performed by: RADIOLOGY

## 2018-11-13 PROCEDURE — 77387 GUIDANCE FOR RADJ TX DLVR: CPT | Mod: ,,, | Performed by: RADIOLOGY

## 2018-11-13 PROCEDURE — 77336 RADIATION PHYSICS CONSULT: CPT | Performed by: RADIOLOGY

## 2018-11-13 NOTE — PLAN OF CARE
Problem: Patient Care Overview  Goal: Plan of Care Review  Outcome: Ongoing (interventions implemented as appropriate)  Pt. On day 4 of xrt to pelvis.  Pt. Has chemo on Wednesday.  No c/o.  Just started.

## 2018-11-14 ENCOUNTER — OFFICE VISIT (OUTPATIENT)
Dept: GYNECOLOGIC ONCOLOGY | Facility: CLINIC | Age: 53
End: 2018-11-14
Payer: MEDICAID

## 2018-11-14 ENCOUNTER — INFUSION (OUTPATIENT)
Dept: INFUSION THERAPY | Facility: HOSPITAL | Age: 53
End: 2018-11-14
Attending: OBSTETRICS & GYNECOLOGY
Payer: MEDICAID

## 2018-11-14 VITALS
DIASTOLIC BLOOD PRESSURE: 80 MMHG | WEIGHT: 229.5 LBS | HEART RATE: 109 BPM | SYSTOLIC BLOOD PRESSURE: 127 MMHG | BODY MASS INDEX: 33.99 KG/M2 | HEIGHT: 69 IN

## 2018-11-14 VITALS — RESPIRATION RATE: 20 BRPM | HEART RATE: 94 BPM | DIASTOLIC BLOOD PRESSURE: 61 MMHG | SYSTOLIC BLOOD PRESSURE: 111 MMHG

## 2018-11-14 DIAGNOSIS — Z51.11 CHEMOTHERAPY MANAGEMENT, ENCOUNTER FOR: ICD-10-CM

## 2018-11-14 DIAGNOSIS — Z85.41 HISTORY OF CERVICAL CANCER: ICD-10-CM

## 2018-11-14 DIAGNOSIS — G62.0 NEUROPATHY DUE TO CHEMOTHERAPEUTIC DRUG: ICD-10-CM

## 2018-11-14 DIAGNOSIS — T45.1X5A NEUROPATHY DUE TO CHEMOTHERAPEUTIC DRUG: ICD-10-CM

## 2018-11-14 DIAGNOSIS — K90.9 DIARRHEA DUE TO MALABSORPTION: ICD-10-CM

## 2018-11-14 DIAGNOSIS — C77.9 METASTATIC SQUAMOUS CELL CARCINOMA TO LYMPH NODE: Primary | ICD-10-CM

## 2018-11-14 DIAGNOSIS — R19.7 DIARRHEA DUE TO MALABSORPTION: ICD-10-CM

## 2018-11-14 PROCEDURE — 25000003 PHARM REV CODE 250: Performed by: OBSTETRICS & GYNECOLOGY

## 2018-11-14 PROCEDURE — 96361 HYDRATE IV INFUSION ADD-ON: CPT

## 2018-11-14 PROCEDURE — 77417 THER RADIOLOGY PORT IMAGE(S): CPT | Performed by: RADIOLOGY

## 2018-11-14 PROCEDURE — 99999 PR PBB SHADOW E&M-EST. PATIENT-LVL III: CPT | Mod: PBBFAC,,, | Performed by: OBSTETRICS & GYNECOLOGY

## 2018-11-14 PROCEDURE — 96367 TX/PROPH/DG ADDL SEQ IV INF: CPT

## 2018-11-14 PROCEDURE — 96413 CHEMO IV INFUSION 1 HR: CPT

## 2018-11-14 PROCEDURE — 99213 OFFICE O/P EST LOW 20 MIN: CPT | Mod: PBBFAC | Performed by: OBSTETRICS & GYNECOLOGY

## 2018-11-14 PROCEDURE — 63600175 PHARM REV CODE 636 W HCPCS: Performed by: OBSTETRICS & GYNECOLOGY

## 2018-11-14 PROCEDURE — 77387 GUIDANCE FOR RADJ TX DLVR: CPT | Mod: ,,, | Performed by: RADIOLOGY

## 2018-11-14 PROCEDURE — 99214 OFFICE O/P EST MOD 30 MIN: CPT | Mod: S$PBB,,, | Performed by: OBSTETRICS & GYNECOLOGY

## 2018-11-14 PROCEDURE — 77386 HC IMRT, COMPLEX: CPT | Performed by: RADIOLOGY

## 2018-11-14 RX ORDER — DIPHENOXYLATE HYDROCHLORIDE AND ATROPINE SULFATE 2.5; .025 MG/1; MG/1
TABLET ORAL
Qty: 30 TABLET | Refills: 1 | Status: SHIPPED | OUTPATIENT
Start: 2018-11-14 | End: 2019-05-23

## 2018-11-14 RX ADMIN — SODIUM CHLORIDE: 0.9 INJECTION, SOLUTION INTRAVENOUS at 11:11

## 2018-11-14 RX ADMIN — SODIUM CHLORIDE: 0.9 INJECTION, SOLUTION INTRAVENOUS at 01:11

## 2018-11-14 RX ADMIN — DEXAMETHASONE SODIUM PHOSPHATE: 4 INJECTION, SOLUTION INTRA-ARTICULAR; INTRALESIONAL; INTRAMUSCULAR; INTRAVENOUS; SOFT TISSUE at 12:11

## 2018-11-14 RX ADMIN — CISPLATIN 91 MG: 1 INJECTION, SOLUTION INTRAVENOUS at 12:11

## 2018-11-14 NOTE — PLAN OF CARE
Problem: Patient Care Overview  Goal: Plan of Care Review  Outcome: Ongoing (interventions implemented as appropriate)  Tolerated chemo infusion without difficulty. No complaints voiced. AVS given to pt.  Instructed to notify MD with any concerns or problems. Pt verbalized understanding.

## 2018-11-14 NOTE — PROGRESS NOTES
Subjective:       Patient ID: Edita Riley is a 53 y.o. female.    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   History of cervical cancer years ago.   Having some diarrhea. 2 stools a day.     Chemotherapy labs have been reviewed and are appropriate for treatment.     Her oncologic history is:  Referring physician:  Dr. Sebas Reed  Reason for referral:  Incisional biopsy of retroperitoneal periaortic node that shows squamous cell carcinoma consistent with gynecologic primary        Aug 2018: chronic abdominal pain referred for consideration of biopsy of a 2 cm right common iliac artery bifurcation node  Node was evident on scan in 4/18 and again in 7/18 - no change  Patient's symptoms are non specific, diffuse, have not resulted in weight loss  Patient already carries a diagnosis of diffuse pain syndromes including fibromyalgia        Aug 2018: PET scan Right common iliac lymph node suspicious for malignancy.  This could be benign or malignant lymphoproliferative disorder metastatic disease.  This is an a risky position for percutaneous biopsy.      Sept 17, 2018: Underwent retroperitoneal approach for incisional biopsy of right common iliac LN. Pathology showed:  Supplemental Diagnosis  METASTATIC HIGH-GRADE CARCINOMA GROWING WITH SQUAMOUS CELL FEATURES. THE RESULTS OF  ADDITIONAL IMMUNOSTAINS ARE AS FOLLOWS: CK5/6, P63 AND CK7 ARE POSITIVE. TTF, S100 AND  CK20 ARE ALL NEGATIVE. THE CONTROLS STAIN APPROPRIATELY.  NOTE: GIVEN THESE RESULTS THIS MAY REPRESENT A PRIMARY IN THE URINARY OR LOWER GYN  TRACTS. DR. JAMESON HAS ALSO REVIEWED THIS CASE AND CONCURS WITH THE DIAGNOSIS.     Pap:  June 6, 2018:  Normal     She has a history of hysterectomy for cervical cancer in 2015 in [place], [place]. Hyst was for abnormal pap. Incidental finding of cervical cancer on the hyst specimen.  "She does not know any other details.            Review of Systems   Constitutional: Negative for chills, fatigue and fever.   Respiratory: Negative for cough, shortness of breath and wheezing.    Cardiovascular: Negative for chest pain, palpitations and leg swelling.   Gastrointestinal: Negative for abdominal pain, constipation, diarrhea, nausea and vomiting.   Genitourinary: Negative for difficulty urinating, dysuria, frequency, genital sores, hematuria, urgency, vaginal bleeding, vaginal discharge and vaginal pain.   Musculoskeletal: Negative for gait problem.   Neurological: Positive for numbness. Negative for weakness.   Hematological: Negative for adenopathy. Does not bruise/bleed easily.   Psychiatric/Behavioral: The patient is not nervous/anxious.        Objective:   /80   Pulse 109   Ht 5' 9" (1.753 m)   Wt 104.1 kg (229 lb 8 oz)   LMP 06/08/2014 (Approximate)   BMI 33.89 kg/m²      Physical Exam   Constitutional: She is oriented to person, place, and time. She appears well-developed and well-nourished.   HENT:   Head: Normocephalic and atraumatic.   Eyes: No scleral icterus.   Neck: No tracheal deviation present. No thyromegaly present.   Cardiovascular: Normal rate and regular rhythm.   Pulmonary/Chest: Effort normal and breath sounds normal.   Abdominal: Soft. She exhibits no distension and no mass. There is no tenderness. There is no rebound and no guarding. No hernia.   Genitourinary:   Genitourinary Comments: Not performed.    Musculoskeletal: She exhibits no edema or tenderness.   Lymphadenopathy:     She has no cervical adenopathy.   Neurological: She is alert and oriented to person, place, and time.   Skin: Skin is warm and dry.   Psychiatric: She has a normal mood and affect. Her behavior is normal. Judgment and thought content normal.       Assessment:       1. 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 History of cervical cancer    3. 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 drug "    4. Chemotherapy management, encounter for    5. Diarrhea due to malabsorption        Plan:   Metastatic squamous cell carcinoma to lymph node  Proceed with cycle 2  RTC in 1 week.   History of cervical cancer    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 drug  Start glutamine.   Chemotherapy management, encounter for    Diarrhea due to RT  Rx sent for Lomotil

## 2018-11-15 PROCEDURE — 77386 HC IMRT, COMPLEX: CPT | Performed by: RADIOLOGY

## 2018-11-15 PROCEDURE — 77387 GUIDANCE FOR RADJ TX DLVR: CPT | Mod: ,,, | Performed by: RADIOLOGY

## 2018-11-16 PROCEDURE — 77386 HC IMRT, COMPLEX: CPT | Performed by: RADIOLOGY

## 2018-11-16 PROCEDURE — 77387 GUIDANCE FOR RADJ TX DLVR: CPT | Mod: ,,, | Performed by: RADIOLOGY

## 2018-11-19 ENCOUNTER — LAB VISIT (OUTPATIENT)
Dept: LAB | Facility: HOSPITAL | Age: 53
End: 2018-11-19
Attending: OBSTETRICS & GYNECOLOGY
Payer: MEDICAID

## 2018-11-19 DIAGNOSIS — C77.9 METASTATIC SQUAMOUS CELL CARCINOMA TO LYMPH NODE: ICD-10-CM

## 2018-11-19 LAB
ANION GAP SERPL CALC-SCNC: 8 MMOL/L
BUN SERPL-MCNC: 19 MG/DL
CALCIUM SERPL-MCNC: 9.2 MG/DL
CHLORIDE SERPL-SCNC: 101 MMOL/L
CO2 SERPL-SCNC: 30 MMOL/L
CREAT SERPL-MCNC: 1 MG/DL
ERYTHROCYTE [DISTWIDTH] IN BLOOD BY AUTOMATED COUNT: 13.7 %
EST. GFR  (AFRICAN AMERICAN): >60 ML/MIN/1.73 M^2
EST. GFR  (NON AFRICAN AMERICAN): >60 ML/MIN/1.73 M^2
GLUCOSE SERPL-MCNC: 121 MG/DL
HCT VFR BLD AUTO: 37.1 %
HGB BLD-MCNC: 11.9 G/DL
IMM GRANULOCYTES # BLD AUTO: 0.03 K/UL
MAGNESIUM SERPL-MCNC: 1.5 MG/DL
MCH RBC QN AUTO: 27.3 PG
MCHC RBC AUTO-ENTMCNC: 32.1 G/DL
MCV RBC AUTO: 85 FL
NEUTROPHILS # BLD AUTO: 5.1 K/UL
PLATELET # BLD AUTO: 182 K/UL
PMV BLD AUTO: 9.7 FL
POTASSIUM SERPL-SCNC: 3.6 MMOL/L
RBC # BLD AUTO: 4.36 M/UL
SODIUM SERPL-SCNC: 139 MMOL/L
WBC # BLD AUTO: 6.49 K/UL

## 2018-11-19 PROCEDURE — 83735 ASSAY OF MAGNESIUM: CPT

## 2018-11-19 PROCEDURE — 85027 COMPLETE CBC AUTOMATED: CPT

## 2018-11-19 PROCEDURE — 80048 BASIC METABOLIC PNL TOTAL CA: CPT

## 2018-11-19 PROCEDURE — 36415 COLL VENOUS BLD VENIPUNCTURE: CPT

## 2018-11-20 DIAGNOSIS — E83.42 HYPOMAGNESEMIA: ICD-10-CM

## 2018-11-20 PROCEDURE — 77386 HC IMRT, COMPLEX: CPT | Performed by: RADIOLOGY

## 2018-11-20 PROCEDURE — 77387 GUIDANCE FOR RADJ TX DLVR: CPT | Mod: ,,, | Performed by: RADIOLOGY

## 2018-11-20 RX ORDER — LANOLIN ALCOHOL/MO/W.PET/CERES
400 CREAM (GRAM) TOPICAL DAILY
Qty: 30 TABLET | Refills: 3 | Status: ON HOLD | COMMUNITY
Start: 2018-11-20 | End: 2018-12-06 | Stop reason: SDUPTHER

## 2018-11-21 ENCOUNTER — INFUSION (OUTPATIENT)
Dept: INFUSION THERAPY | Facility: HOSPITAL | Age: 53
End: 2018-11-21
Attending: OBSTETRICS & GYNECOLOGY
Payer: MEDICAID

## 2018-11-21 ENCOUNTER — OFFICE VISIT (OUTPATIENT)
Dept: GYNECOLOGIC ONCOLOGY | Facility: CLINIC | Age: 53
End: 2018-11-21
Payer: MEDICAID

## 2018-11-21 VITALS
RESPIRATION RATE: 20 BRPM | HEART RATE: 106 BPM | SYSTOLIC BLOOD PRESSURE: 138 MMHG | TEMPERATURE: 98 F | DIASTOLIC BLOOD PRESSURE: 81 MMHG

## 2018-11-21 VITALS
HEART RATE: 91 BPM | DIASTOLIC BLOOD PRESSURE: 64 MMHG | BODY MASS INDEX: 34.11 KG/M2 | SYSTOLIC BLOOD PRESSURE: 133 MMHG | WEIGHT: 231 LBS

## 2018-11-21 DIAGNOSIS — E83.42 HYPOMAGNESEMIA: ICD-10-CM

## 2018-11-21 DIAGNOSIS — C77.9 METASTATIC SQUAMOUS CELL CARCINOMA TO LYMPH NODE: Primary | ICD-10-CM

## 2018-11-21 DIAGNOSIS — Z85.41 HISTORY OF CERVICAL CANCER: ICD-10-CM

## 2018-11-21 DIAGNOSIS — Z51.11 CHEMOTHERAPY MANAGEMENT, ENCOUNTER FOR: ICD-10-CM

## 2018-11-21 PROCEDURE — 96413 CHEMO IV INFUSION 1 HR: CPT

## 2018-11-21 PROCEDURE — 96367 TX/PROPH/DG ADDL SEQ IV INF: CPT

## 2018-11-21 PROCEDURE — 99213 OFFICE O/P EST LOW 20 MIN: CPT | Mod: PBBFAC,25 | Performed by: OBSTETRICS & GYNECOLOGY

## 2018-11-21 PROCEDURE — 77386 HC IMRT, COMPLEX: CPT | Performed by: RADIOLOGY

## 2018-11-21 PROCEDURE — 77387 GUIDANCE FOR RADJ TX DLVR: CPT | Mod: ,,, | Performed by: RADIOLOGY

## 2018-11-21 PROCEDURE — 63600175 PHARM REV CODE 636 W HCPCS: Performed by: OBSTETRICS & GYNECOLOGY

## 2018-11-21 PROCEDURE — 99999 PR PBB SHADOW E&M-EST. PATIENT-LVL III: CPT | Mod: PBBFAC,,, | Performed by: OBSTETRICS & GYNECOLOGY

## 2018-11-21 PROCEDURE — 96361 HYDRATE IV INFUSION ADD-ON: CPT

## 2018-11-21 PROCEDURE — 99214 OFFICE O/P EST MOD 30 MIN: CPT | Mod: S$PBB,,, | Performed by: OBSTETRICS & GYNECOLOGY

## 2018-11-21 PROCEDURE — 25000003 PHARM REV CODE 250: Performed by: OBSTETRICS & GYNECOLOGY

## 2018-11-21 RX ORDER — SODIUM CHLORIDE 0.9 % (FLUSH) 0.9 %
10 SYRINGE (ML) INJECTION
Status: CANCELLED | OUTPATIENT
Start: 2018-11-21

## 2018-11-21 RX ORDER — HEPARIN 100 UNIT/ML
500 SYRINGE INTRAVENOUS
Status: CANCELLED | OUTPATIENT
Start: 2018-11-21

## 2018-11-21 RX ADMIN — SODIUM CHLORIDE: 0.9 INJECTION, SOLUTION INTRAVENOUS at 10:11

## 2018-11-21 RX ADMIN — DEXAMETHASONE SODIUM PHOSPHATE: 4 INJECTION, SOLUTION INTRA-ARTICULAR; INTRALESIONAL; INTRAMUSCULAR; INTRAVENOUS; SOFT TISSUE at 11:11

## 2018-11-21 RX ADMIN — CISPLATIN 91 MG: 100 INJECTION, SOLUTION INTRAVENOUS at 12:11

## 2018-11-21 RX ADMIN — SODIUM CHLORIDE: 0.9 INJECTION, SOLUTION INTRAVENOUS at 01:11

## 2018-11-21 NOTE — PROGRESS NOTES
Subjective:       Patient ID: Edita Riley is a 53 y.o. female.    Chief Complaint: Chemotherapy (cisplantin weekly week 3)    HPI     Patient comes in today for cycle 3 of cisplatin for concurrent chemoradiation for SCCA of a right common iliac LN.   History of cervical cancer years ago.   Diarrhea better with lomotil. Taking lomotil eery morning.        .   Chemotherapy labs have been reviewed and are appropriate for treatment.      Her oncologic history is:  Referring physician:  Dr. Sebas Reed  Reason for referral:  Incisional biopsy of retroperitoneal periaortic node that shows squamous cell carcinoma consistent with gynecologic primary        Aug 2018: chronic abdominal pain referred for consideration of biopsy of a 2 cm right common iliac artery bifurcation node  Node was evident on scan in 4/18 and again in 7/18 - no change  Patient's symptoms are non specific, diffuse, have not resulted in weight loss  Patient already carries a diagnosis of diffuse pain syndromes including fibromyalgia        Aug 2018: PET scan Right common iliac lymph node suspicious for malignancy.  This could be benign or malignant lymphoproliferative disorder metastatic disease.  This is an a risky position for percutaneous biopsy.      Sept 17, 2018: Underwent retroperitoneal approach for incisional biopsy of right common iliac LN. Pathology showed:  Supplemental Diagnosis  METASTATIC HIGH-GRADE CARCINOMA GROWING WITH SQUAMOUS CELL FEATURES. THE RESULTS OF  ADDITIONAL IMMUNOSTAINS ARE AS FOLLOWS: CK5/6, P63 AND CK7 ARE POSITIVE. TTF, S100 AND  CK20 ARE ALL NEGATIVE. THE CONTROLS STAIN APPROPRIATELY.  NOTE: GIVEN THESE RESULTS THIS MAY REPRESENT A PRIMARY IN THE URINARY OR LOWER GYN  TRACTS. DR. JAMESON HAS ALSO REVIEWED THIS CASE AND CONCURS WITH THE DIAGNOSIS.     Pap:  June 6, 2018:  Normal     She has a history of hysterectomy for cervical cancer in 2015 in [place], [place]. Hyst was for abnormal pap. Incidental finding  of cervical cancer on the hyst specimen. She does not know any other details.            Review of Systems   Constitutional: Negative for chills, fatigue and fever.   Respiratory: Negative for cough, shortness of breath and wheezing.    Cardiovascular: Negative for chest pain, palpitations and leg swelling.   Gastrointestinal: Negative for abdominal pain, constipation, diarrhea, nausea and vomiting.   Genitourinary: Negative for difficulty urinating, dysuria, frequency, genital sores, hematuria, urgency, vaginal bleeding, vaginal discharge and vaginal pain.   Musculoskeletal: Negative for gait problem.   Neurological: Positive for weakness (grade 1). Negative for numbness.   Hematological: Negative for adenopathy. Does not bruise/bleed easily.   Psychiatric/Behavioral: The patient is not nervous/anxious.        Objective:   /64   Pulse 91   Wt 104.8 kg (231 lb)   LMP 06/08/2014 (Approximate)   BMI 34.11 kg/m²      Physical Exam   Constitutional: She is oriented to person, place, and time. She appears well-developed and well-nourished.   HENT:   Head: Normocephalic and atraumatic.   Eyes: No scleral icterus.   Neck: No tracheal deviation present. No thyromegaly present.   Cardiovascular: Normal rate and regular rhythm.   Pulmonary/Chest: Effort normal and breath sounds normal.   Abdominal: Soft. She exhibits no distension and no mass. There is no tenderness. There is no rebound and no guarding. No hernia.   Genitourinary:   Genitourinary Comments: Not performed.    Musculoskeletal: She exhibits no edema or tenderness.   Lymphadenopathy:     She has no cervical adenopathy.   Neurological: She is alert and oriented to person, place, and time.   Skin: Skin is warm and dry.   Psychiatric: She has a normal mood and affect. Her behavior is normal. Judgment and thought content normal.       Assessment:       1. 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 Hypomagnesemia    3. History of cervical cancer     4. Chemotherapy management, encounter for        Plan:   Metastatic squamous cell carcinoma to lymph node  Proceed with cycle 3   RTC in 1 week   Hypomagnesemia  Increase mag oxide to bid  History of cervical cancer    Chemotherapy management, encounter for

## 2018-11-21 NOTE — PLAN OF CARE
Problem: Patient Care Overview  Goal: Plan of Care Review  Outcome: Ongoing (interventions implemented as appropriate)  Pt tolerated Cisplatin with no complications. Pt instructed to call MD with any problems. NAD. Pt discharged home independently with family member at side.

## 2018-11-23 PROCEDURE — 77386 HC IMRT, COMPLEX: CPT | Performed by: RADIOLOGY

## 2018-11-23 PROCEDURE — 77417 THER RADIOLOGY PORT IMAGE(S): CPT | Performed by: RADIOLOGY

## 2018-11-23 PROCEDURE — 77387 GUIDANCE FOR RADJ TX DLVR: CPT | Mod: ,,, | Performed by: RADIOLOGY

## 2018-11-26 ENCOUNTER — TELEPHONE (OUTPATIENT)
Dept: GYNECOLOGIC ONCOLOGY | Facility: CLINIC | Age: 53
End: 2018-11-26

## 2018-11-26 ENCOUNTER — LAB VISIT (OUTPATIENT)
Dept: LAB | Facility: HOSPITAL | Age: 53
End: 2018-11-26
Attending: OBSTETRICS & GYNECOLOGY
Payer: MEDICAID

## 2018-11-26 DIAGNOSIS — C77.9 METASTATIC SQUAMOUS CELL CARCINOMA TO LYMPH NODE: ICD-10-CM

## 2018-11-26 LAB
ANION GAP SERPL CALC-SCNC: 11 MMOL/L
BUN SERPL-MCNC: 18 MG/DL
CALCIUM SERPL-MCNC: 7.7 MG/DL
CHLORIDE SERPL-SCNC: 98 MMOL/L
CO2 SERPL-SCNC: 27 MMOL/L
CREAT SERPL-MCNC: 1.1 MG/DL
ERYTHROCYTE [DISTWIDTH] IN BLOOD BY AUTOMATED COUNT: 14 %
EST. GFR  (AFRICAN AMERICAN): >60 ML/MIN/1.73 M^2
EST. GFR  (NON AFRICAN AMERICAN): 56.7 ML/MIN/1.73 M^2
GLUCOSE SERPL-MCNC: 202 MG/DL
HCT VFR BLD AUTO: 37 %
HGB BLD-MCNC: 11.4 G/DL
IMM GRANULOCYTES # BLD AUTO: 0.02 K/UL
MAGNESIUM SERPL-MCNC: 1.2 MG/DL
MCH RBC QN AUTO: 26.7 PG
MCHC RBC AUTO-ENTMCNC: 30.8 G/DL
MCV RBC AUTO: 87 FL
NEUTROPHILS # BLD AUTO: 3.1 K/UL
PLATELET # BLD AUTO: 123 K/UL
PMV BLD AUTO: 9.6 FL
POTASSIUM SERPL-SCNC: 3.5 MMOL/L
RBC # BLD AUTO: 4.27 M/UL
SODIUM SERPL-SCNC: 136 MMOL/L
WBC # BLD AUTO: 4.09 K/UL

## 2018-11-26 PROCEDURE — 80048 BASIC METABOLIC PNL TOTAL CA: CPT

## 2018-11-26 PROCEDURE — 77387 GUIDANCE FOR RADJ TX DLVR: CPT | Mod: ,,, | Performed by: RADIOLOGY

## 2018-11-26 PROCEDURE — 83735 ASSAY OF MAGNESIUM: CPT

## 2018-11-26 PROCEDURE — 85027 COMPLETE CBC AUTOMATED: CPT

## 2018-11-26 PROCEDURE — 77386 HC IMRT, COMPLEX: CPT | Performed by: RADIOLOGY

## 2018-11-26 PROCEDURE — 36415 COLL VENOUS BLD VENIPUNCTURE: CPT

## 2018-11-26 PROCEDURE — 77336 RADIATION PHYSICS CONSULT: CPT | Performed by: RADIOLOGY

## 2018-11-26 RX ORDER — HEPARIN 100 UNIT/ML
500 SYRINGE INTRAVENOUS
Status: CANCELLED | OUTPATIENT
Start: 2018-11-28

## 2018-11-26 RX ORDER — SODIUM CHLORIDE 0.9 % (FLUSH) 0.9 %
10 SYRINGE (ML) INJECTION
Status: CANCELLED | OUTPATIENT
Start: 2018-11-28

## 2018-11-26 NOTE — TELEPHONE ENCOUNTER
Called patient per Dr. Lemon informing to increase her magnesium pills to 2 tablets a day. Patient voice understanding. BALTAZAR

## 2018-11-26 NOTE — TELEPHONE ENCOUNTER
----- Message from Refugio Lemon MD sent at 11/26/2018  1:55 PM CST -----  Please call and tell her to increase her magnesium to 2 tablets a day. Will also need IV magnesium when she comes in for chemotherapy please let infusion center know.

## 2018-11-27 ENCOUNTER — TELEPHONE (OUTPATIENT)
Dept: GYNECOLOGIC ONCOLOGY | Facility: CLINIC | Age: 53
End: 2018-11-27

## 2018-11-27 ENCOUNTER — PROCEDURE VISIT (OUTPATIENT)
Dept: RADIATION ONCOLOGY | Facility: CLINIC | Age: 53
End: 2018-11-27
Payer: MEDICAID

## 2018-11-27 DIAGNOSIS — Z85.41 HISTORY OF CERVICAL CANCER: ICD-10-CM

## 2018-11-27 DIAGNOSIS — C77.9 METASTATIC SQUAMOUS CELL CARCINOMA TO LYMPH NODE: Primary | ICD-10-CM

## 2018-11-27 PROCEDURE — 77290 THER RAD SIMULAJ FIELD CPLX: CPT | Mod: 26,,, | Performed by: RADIOLOGY

## 2018-11-27 PROCEDURE — 77290 THER RAD SIMULAJ FIELD CPLX: CPT | Mod: TC | Performed by: RADIOLOGY

## 2018-11-27 PROCEDURE — 77386 HC IMRT, COMPLEX: CPT | Performed by: RADIOLOGY

## 2018-11-27 PROCEDURE — 77387 GUIDANCE FOR RADJ TX DLVR: CPT | Mod: ,,, | Performed by: RADIOLOGY

## 2018-11-27 PROCEDURE — 77334 RADIATION TREATMENT AID(S): CPT | Mod: 26,,, | Performed by: RADIOLOGY

## 2018-11-27 PROCEDURE — 77014 HC CT GUIDANCE RADIATION THERAPY FLDS PLACEMENT: CPT | Mod: TC | Performed by: RADIOLOGY

## 2018-11-27 PROCEDURE — 77334 RADIATION TREATMENT AID(S): CPT | Mod: TC,59 | Performed by: RADIOLOGY

## 2018-11-27 NOTE — PROCEDURES
Ms. Riley is here today for treatment planning CT for vaginal cuff brachytherapy.  Patient is currently receiving whole pelvic radiation concurrent with chemotherapy.    She was placed supine on the CT table and a 2.5 cm vaginal cylinder was inserted into the vagina.  Images were obtained and verified.  Patient tolerated the procedure without any unexpected side effects.  She will continue whole pelvic radiation concurrent with chemotherapy as planned.  Upon completion of 4500 cGy, I plan to deliver additional boost to the periaortic lymph nodes as well as HDR brachytherapy to the vaginal cuff.

## 2018-11-28 ENCOUNTER — OFFICE VISIT (OUTPATIENT)
Dept: GYNECOLOGIC ONCOLOGY | Facility: CLINIC | Age: 53
End: 2018-11-28
Payer: MEDICAID

## 2018-11-28 ENCOUNTER — TELEPHONE (OUTPATIENT)
Dept: INFUSION THERAPY | Facility: HOSPITAL | Age: 53
End: 2018-11-28

## 2018-11-28 ENCOUNTER — INFUSION (OUTPATIENT)
Dept: INFUSION THERAPY | Facility: HOSPITAL | Age: 53
End: 2018-11-28
Attending: OBSTETRICS & GYNECOLOGY
Payer: MEDICAID

## 2018-11-28 ENCOUNTER — DOCUMENTATION ONLY (OUTPATIENT)
Dept: GYNECOLOGIC ONCOLOGY | Facility: CLINIC | Age: 53
End: 2018-11-28

## 2018-11-28 ENCOUNTER — TELEPHONE (OUTPATIENT)
Dept: GYNECOLOGIC ONCOLOGY | Facility: CLINIC | Age: 53
End: 2018-11-28

## 2018-11-28 VITALS
SYSTOLIC BLOOD PRESSURE: 120 MMHG | HEART RATE: 103 BPM | HEIGHT: 69 IN | BODY MASS INDEX: 33.8 KG/M2 | WEIGHT: 228.19 LBS | DIASTOLIC BLOOD PRESSURE: 71 MMHG

## 2018-11-28 VITALS
RESPIRATION RATE: 18 BRPM | SYSTOLIC BLOOD PRESSURE: 144 MMHG | TEMPERATURE: 99 F | HEIGHT: 69 IN | BODY MASS INDEX: 33.77 KG/M2 | HEART RATE: 106 BPM | DIASTOLIC BLOOD PRESSURE: 70 MMHG | WEIGHT: 228 LBS

## 2018-11-28 DIAGNOSIS — C77.9 METASTATIC SQUAMOUS CELL CARCINOMA TO LYMPH NODE: Primary | ICD-10-CM

## 2018-11-28 DIAGNOSIS — Z51.11 CHEMOTHERAPY MANAGEMENT, ENCOUNTER FOR: ICD-10-CM

## 2018-11-28 DIAGNOSIS — E83.42 HYPOMAGNESEMIA: ICD-10-CM

## 2018-11-28 PROCEDURE — 99213 OFFICE O/P EST LOW 20 MIN: CPT | Mod: PBBFAC | Performed by: OBSTETRICS & GYNECOLOGY

## 2018-11-28 PROCEDURE — 96367 TX/PROPH/DG ADDL SEQ IV INF: CPT

## 2018-11-28 PROCEDURE — 96365 THER/PROPH/DIAG IV INF INIT: CPT

## 2018-11-28 PROCEDURE — 77386 HC IMRT, COMPLEX: CPT | Performed by: RADIOLOGY

## 2018-11-28 PROCEDURE — 25000003 PHARM REV CODE 250: Performed by: OBSTETRICS & GYNECOLOGY

## 2018-11-28 PROCEDURE — 77387 GUIDANCE FOR RADJ TX DLVR: CPT | Mod: ,,, | Performed by: RADIOLOGY

## 2018-11-28 PROCEDURE — 96413 CHEMO IV INFUSION 1 HR: CPT

## 2018-11-28 PROCEDURE — 63600175 PHARM REV CODE 636 W HCPCS: Performed by: OBSTETRICS & GYNECOLOGY

## 2018-11-28 PROCEDURE — 96366 THER/PROPH/DIAG IV INF ADDON: CPT

## 2018-11-28 PROCEDURE — 96361 HYDRATE IV INFUSION ADD-ON: CPT

## 2018-11-28 PROCEDURE — 99214 OFFICE O/P EST MOD 30 MIN: CPT | Mod: S$PBB,,, | Performed by: OBSTETRICS & GYNECOLOGY

## 2018-11-28 PROCEDURE — 99999 PR PBB SHADOW E&M-EST. PATIENT-LVL III: CPT | Mod: PBBFAC,,, | Performed by: OBSTETRICS & GYNECOLOGY

## 2018-11-28 RX ORDER — HEPARIN 100 UNIT/ML
500 SYRINGE INTRAVENOUS
Status: DISCONTINUED | OUTPATIENT
Start: 2018-11-28 | End: 2018-11-28 | Stop reason: HOSPADM

## 2018-11-28 RX ORDER — MAGNESIUM SULFATE HEPTAHYDRATE 40 MG/ML
2 INJECTION, SOLUTION INTRAVENOUS
Status: COMPLETED | OUTPATIENT
Start: 2018-11-28 | End: 2018-11-28

## 2018-11-28 RX ORDER — SODIUM CHLORIDE 0.9 % (FLUSH) 0.9 %
10 SYRINGE (ML) INJECTION
Status: DISCONTINUED | OUTPATIENT
Start: 2018-11-28 | End: 2018-11-28 | Stop reason: HOSPADM

## 2018-11-28 RX ADMIN — DEXAMETHASONE SODIUM PHOSPHATE: 4 INJECTION, SOLUTION INTRA-ARTICULAR; INTRALESIONAL; INTRAMUSCULAR; INTRAVENOUS; SOFT TISSUE at 11:11

## 2018-11-28 RX ADMIN — MAGNESIUM SULFATE HEPTAHYDRATE 2 G: 40 INJECTION, SOLUTION INTRAVENOUS at 09:11

## 2018-11-28 RX ADMIN — CISPLATIN 91 MG: 1 INJECTION INTRAVENOUS at 11:11

## 2018-11-28 RX ADMIN — SODIUM CHLORIDE: 0.9 INJECTION, SOLUTION INTRAVENOUS at 09:11

## 2018-11-28 RX ADMIN — SODIUM CHLORIDE: 0.9 INJECTION, SOLUTION INTRAVENOUS at 12:11

## 2018-11-28 NOTE — PROGRESS NOTES
Pt. Seen per MD request. Met with pt. In chemo infusion and discussed recent recurrence of cancer and how she is coping with treatment and illness. Pt. States that she has had a hard time recently as she lost her 19 y.o. daughter earlier this year from suicide. Pt. Had twin girls and states 1 twin was going thru a lot and did not talk about her issues and unfortunately took her life. Pt. States that this has been extremely difficult for her, her  and her living twin daughter. Pt. Also states that she has had a hard time with recurrence of her cancer. She states that her living twin daughter is also having a hard time with loss of sister and now her mother is ill. Provided support to the pt. And also inquired about pts. Willingness to go to counseling. Pt. Very interested. Will speak with Dr. Lemon about a referral to Psychology/Oncology (Dr. Chantal Martinez). Pt. Also inquired about help with getting SSI, will speak with disability office to see if pt. Would qualify for SSI. Pt. Aware of the plan and in agreement. Will follow.

## 2018-11-28 NOTE — PROGRESS NOTES
Subjective:       Patient ID: Edita Riley is a 53 y.o. female.    Chief Complaint: metastatic squamous cell carcinmoa to lymph node and Chemotherapy (Cisplatin C4)    HPI     Patient comes in today for cycle 4 of cisplatin for concurrent chemoradiation for SCCA of a right common iliac LN.   History of cervical cancer years ago.   Diarrhea better with lomotil. Taking lomotil every morning.     Reports that she tried to hurt herself last week by bending her fingers backwards. No suicidal ideation.     Chemo labs shows low mag. Given 2 grams IV and on 800 mg mag oxide daily.        .   Chemotherapy labs have been reviewed and are appropriate for treatment.      Her oncologic history is:  Referring physician:  Dr. Sebas Rede  Reason for referral:  Incisional biopsy of retroperitoneal periaortic node that shows squamous cell carcinoma consistent with gynecologic primary        Aug 2018: chronic abdominal pain referred for consideration of biopsy of a 2 cm right common iliac artery bifurcation node  Node was evident on scan in 4/18 and again in 7/18 - no change  Patient's symptoms are non specific, diffuse, have not resulted in weight loss  Patient already carries a diagnosis of diffuse pain syndromes including fibromyalgia        Aug 2018: PET scan Right common iliac lymph node suspicious for malignancy.  This could be benign or malignant lymphoproliferative disorder metastatic disease.  This is an a risky position for percutaneous biopsy.      Sept 17, 2018: Underwent retroperitoneal approach for incisional biopsy of right common iliac LN. Pathology showed:  Supplemental Diagnosis  METASTATIC HIGH-GRADE CARCINOMA GROWING WITH SQUAMOUS CELL FEATURES. THE RESULTS OF  ADDITIONAL IMMUNOSTAINS ARE AS FOLLOWS: CK5/6, P63 AND CK7 ARE POSITIVE. TTF, S100 AND  CK20 ARE ALL NEGATIVE. THE CONTROLS STAIN APPROPRIATELY.  NOTE: GIVEN THESE RESULTS THIS MAY REPRESENT A PRIMARY IN THE URINARY OR LOWER GYN  TRACTS.   "TRINO HAS ALSO REVIEWED THIS CASE AND CONCURS WITH THE DIAGNOSIS.     Pap:  June 6, 2018:  Normal     She has a history of hysterectomy for cervical cancer in 2015 in [place], [place]. Hyst was for abnormal pap. Incidental finding of cervical cancer on the hyst specimen. She does not know any other details.            Review of Systems   Constitutional: Negative for chills, fatigue and fever.   Respiratory: Negative for cough, shortness of breath and wheezing.    Cardiovascular: Negative for chest pain, palpitations and leg swelling.   Gastrointestinal: Positive for nausea and vomiting. Negative for abdominal pain, constipation and diarrhea.   Genitourinary: Negative for difficulty urinating, dysuria, frequency, genital sores, hematuria, urgency, vaginal bleeding, vaginal discharge and vaginal pain.   Musculoskeletal: Negative for gait problem.   Neurological: Positive for numbness (bilateral feet. numbness. ). Negative for weakness.   Hematological: Negative for adenopathy. Does not bruise/bleed easily.   Psychiatric/Behavioral: The patient is not nervous/anxious.        Objective:   /71   Pulse 103   Ht 5' 9" (1.753 m)   Wt 103.5 kg (228 lb 2.8 oz)   LMP 06/08/2014 (Approximate)   BMI 33.70 kg/m²      Physical Exam   Constitutional: She is oriented to person, place, and time. She appears well-developed and well-nourished.   HENT:   Head: Normocephalic and atraumatic.   Eyes: No scleral icterus.   Neck: No tracheal deviation present. No thyromegaly present.   Cardiovascular: Normal rate and regular rhythm.   Pulmonary/Chest: Effort normal and breath sounds normal.   Abdominal: Soft. She exhibits no distension and no mass. There is no tenderness. There is no rebound and no guarding. No hernia.   Genitourinary:   Genitourinary Comments: Not performed.    Musculoskeletal: She exhibits no edema or tenderness.   Lymphadenopathy:     She has no cervical adenopathy.   Neurological: She is alert and oriented to " person, place, and time.   Skin: Skin is warm and dry.   Psychiatric: She has a normal mood and affect. Her behavior is normal. Judgment and thought content normal.       Assessment:       1. 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 Chemotherapy management, encounter for    3. Hypomagnesemia        Plan:   Metastatic squamous cell carcinoma to lymph node  Proceed with cycle 4.   RTC in 1 week.   Repeat mag today.   Chemotherapy management, encounter for    Hypomagnesemia  -     Magnesium; Future; Expected date: 11/28/2018

## 2018-11-28 NOTE — PLAN OF CARE
Problem: Patient Care Overview  Goal: Plan of Care Review  Outcome: Ongoing (interventions implemented as appropriate)  Pt tolerated ivf , Mg rider and cisplatin without issue, pt to rtc 12/5/18, no distress noted upon d/c to home

## 2018-11-28 NOTE — PLAN OF CARE
Problem: Chemotherapy Effects (Adult)  Goal: Signs and Symptoms of Listed Potential Problems Will be Absent, Minimized or Managed (Chemotherapy Effects)  Signs and symptoms of listed potential problems will be absent, minimized or managed by discharge/transition of care (reference Chemotherapy Effects (Adult) CPG).  Outcome: Ongoing (interventions implemented as appropriate)  Pt here for cisplatin,ivf and Mg rider, labs, hx, meds, allergies reviewed, pt with no complaints at this time, reclined in chair, continue to monitor

## 2018-11-29 DIAGNOSIS — F32.A DEPRESSION, UNSPECIFIED DEPRESSION TYPE: Primary | ICD-10-CM

## 2018-11-29 PROCEDURE — 77386 HC IMRT, COMPLEX: CPT | Performed by: RADIOLOGY

## 2018-11-29 PROCEDURE — 77387 GUIDANCE FOR RADJ TX DLVR: CPT | Mod: ,,, | Performed by: RADIOLOGY

## 2018-11-30 PROCEDURE — 77417 THER RADIOLOGY PORT IMAGE(S): CPT | Performed by: RADIOLOGY

## 2018-11-30 PROCEDURE — 77387 GUIDANCE FOR RADJ TX DLVR: CPT | Mod: ,,, | Performed by: RADIOLOGY

## 2018-11-30 PROCEDURE — 77386 HC IMRT, COMPLEX: CPT | Performed by: RADIOLOGY

## 2018-12-02 ENCOUNTER — HOSPITAL ENCOUNTER (INPATIENT)
Facility: HOSPITAL | Age: 53
LOS: 4 days | Discharge: HOME | DRG: 101 | End: 2018-12-06
Attending: EMERGENCY MEDICINE | Admitting: EMERGENCY MEDICINE
Payer: MEDICAID

## 2018-12-02 DIAGNOSIS — Z74.09 IMPAIRED FUNCTIONAL MOBILITY, BALANCE, GAIT, AND ENDURANCE: ICD-10-CM

## 2018-12-02 DIAGNOSIS — F43.10 PTSD (POST-TRAUMATIC STRESS DISORDER): ICD-10-CM

## 2018-12-02 DIAGNOSIS — G62.0 NEUROPATHY DUE TO CHEMOTHERAPEUTIC DRUG: ICD-10-CM

## 2018-12-02 DIAGNOSIS — G51.39 HEMIFACIAL SPASM: ICD-10-CM

## 2018-12-02 DIAGNOSIS — R53.1 WEAKNESS: ICD-10-CM

## 2018-12-02 DIAGNOSIS — G47.20 SLEEP STAGE DYSFUNCTION: ICD-10-CM

## 2018-12-02 DIAGNOSIS — F33.3 SEVERE EPISODE OF RECURRENT MAJOR DEPRESSIVE DISORDER, WITH PSYCHOTIC FEATURES: ICD-10-CM

## 2018-12-02 DIAGNOSIS — C77.9 METASTATIC SQUAMOUS CELL CARCINOMA TO LYMPH NODE: ICD-10-CM

## 2018-12-02 DIAGNOSIS — E83.51 HYPOCALCEMIA: ICD-10-CM

## 2018-12-02 DIAGNOSIS — F41.1 GENERALIZED ANXIETY DISORDER: ICD-10-CM

## 2018-12-02 DIAGNOSIS — G51.0 FACIAL PALSY: ICD-10-CM

## 2018-12-02 DIAGNOSIS — R29.898 WEAKNESS OF BOTH LOWER EXTREMITIES: ICD-10-CM

## 2018-12-02 DIAGNOSIS — R26.2 DIFFICULTY WALKING: ICD-10-CM

## 2018-12-02 DIAGNOSIS — F33.3 SEVERE RECURRENT MAJOR DEPRESSION WITH PSYCHOTIC FEATURES: ICD-10-CM

## 2018-12-02 DIAGNOSIS — E83.42 HYPOMAGNESEMIA: ICD-10-CM

## 2018-12-02 DIAGNOSIS — T45.1X5A NEUROPATHY DUE TO CHEMOTHERAPEUTIC DRUG: ICD-10-CM

## 2018-12-02 DIAGNOSIS — E87.6 HYPOKALEMIA: Primary | ICD-10-CM

## 2018-12-02 DIAGNOSIS — R94.31 QT PROLONGATION: ICD-10-CM

## 2018-12-02 DIAGNOSIS — Z85.41 HISTORY OF CERVICAL CANCER: ICD-10-CM

## 2018-12-02 DIAGNOSIS — R56.9 SEIZURE-LIKE ACTIVITY: ICD-10-CM

## 2018-12-02 PROBLEM — E87.8 ELECTROLYTE DISORDER: Status: ACTIVE | Noted: 2018-12-02

## 2018-12-02 PROBLEM — I63.9 STROKE: Status: ACTIVE | Noted: 2018-12-02

## 2018-12-02 LAB
ALBUMIN SERPL BCP-MCNC: 3.4 G/DL
ALP SERPL-CCNC: 120 U/L
ALT SERPL W/O P-5'-P-CCNC: 54 U/L
ANION GAP SERPL CALC-SCNC: 17 MMOL/L
APAP SERPL-MCNC: <3 UG/ML
AST SERPL-CCNC: 31 U/L
BASOPHILS # BLD AUTO: 0.01 K/UL
BASOPHILS NFR BLD: 0.2 %
BILIRUB SERPL-MCNC: 0.6 MG/DL
BUN SERPL-MCNC: 19 MG/DL
CALCIUM SERPL-MCNC: 7 MG/DL
CHLORIDE SERPL-SCNC: 96 MMOL/L
CHOLEST SERPL-MCNC: 174 MG/DL
CHOLEST/HDLC SERPL: 2.5 {RATIO}
CO2 SERPL-SCNC: 24 MMOL/L
CREAT SERPL-MCNC: 1.2 MG/DL
DIFFERENTIAL METHOD: ABNORMAL
EOSINOPHIL # BLD AUTO: 0 K/UL
EOSINOPHIL NFR BLD: 0.5 %
ERYTHROCYTE [DISTWIDTH] IN BLOOD BY AUTOMATED COUNT: 14 %
EST. GFR  (AFRICAN AMERICAN): 58.8 ML/MIN/1.73 M^2
EST. GFR  (NON AFRICAN AMERICAN): 51 ML/MIN/1.73 M^2
ETHANOL SERPL-MCNC: <10 MG/DL
GLUCOSE SERPL-MCNC: 197 MG/DL
HCT VFR BLD AUTO: 35 %
HDLC SERPL-MCNC: 69 MG/DL
HDLC SERPL: 39.7 %
HGB BLD-MCNC: 11.5 G/DL
IMM GRANULOCYTES # BLD AUTO: 0.02 K/UL
IMM GRANULOCYTES NFR BLD AUTO: 0.3 %
INR PPP: 1
LDLC SERPL CALC-MCNC: 76 MG/DL
LIPASE SERPL-CCNC: 61 U/L
LYMPHOCYTES # BLD AUTO: 0.4 K/UL
LYMPHOCYTES NFR BLD: 6.6 %
MAGNESIUM SERPL-MCNC: 0.7 MG/DL
MCH RBC QN AUTO: 27.8 PG
MCHC RBC AUTO-ENTMCNC: 32.9 G/DL
MCV RBC AUTO: 85 FL
MONOCYTES # BLD AUTO: 0.6 K/UL
MONOCYTES NFR BLD: 10.4 %
NEUTROPHILS # BLD AUTO: 4.7 K/UL
NEUTROPHILS NFR BLD: 82 %
NONHDLC SERPL-MCNC: 105 MG/DL
NRBC BLD-RTO: 0 /100 WBC
PLATELET # BLD AUTO: 135 K/UL
PMV BLD AUTO: 9.5 FL
POCT GLUCOSE: 163 MG/DL (ref 70–110)
POCT GLUCOSE: 176 MG/DL (ref 70–110)
POTASSIUM SERPL-SCNC: 3.2 MMOL/L
PROT SERPL-MCNC: 6.9 G/DL
PROTHROMBIN TIME: 10.8 SEC
RBC # BLD AUTO: 4.13 M/UL
SALICYLATES SERPL-MCNC: <5 MG/DL
SODIUM SERPL-SCNC: 137 MMOL/L
TRIGL SERPL-MCNC: 145 MG/DL
TROPONIN I SERPL DL<=0.01 NG/ML-MCNC: <0.006 NG/ML
TSH SERPL DL<=0.005 MIU/L-ACNC: 0.53 UIU/ML
WBC # BLD AUTO: 5.75 K/UL

## 2018-12-02 PROCEDURE — 93010 ELECTROCARDIOGRAM REPORT: CPT | Mod: ,,, | Performed by: INTERNAL MEDICINE

## 2018-12-02 PROCEDURE — 96375 TX/PRO/DX INJ NEW DRUG ADDON: CPT

## 2018-12-02 PROCEDURE — 83690 ASSAY OF LIPASE: CPT

## 2018-12-02 PROCEDURE — 84443 ASSAY THYROID STIM HORMONE: CPT

## 2018-12-02 PROCEDURE — C9254 INJECTION, LACOSAMIDE: HCPCS | Performed by: STUDENT IN AN ORGANIZED HEALTH CARE EDUCATION/TRAINING PROGRAM

## 2018-12-02 PROCEDURE — 63600175 PHARM REV CODE 636 W HCPCS: Performed by: STUDENT IN AN ORGANIZED HEALTH CARE EDUCATION/TRAINING PROGRAM

## 2018-12-02 PROCEDURE — 83735 ASSAY OF MAGNESIUM: CPT

## 2018-12-02 PROCEDURE — 80061 LIPID PANEL: CPT

## 2018-12-02 PROCEDURE — 12000002 HC ACUTE/MED SURGE SEMI-PRIVATE ROOM

## 2018-12-02 PROCEDURE — 96365 THER/PROPH/DIAG IV INF INIT: CPT | Mod: 59

## 2018-12-02 PROCEDURE — 84484 ASSAY OF TROPONIN QUANT: CPT

## 2018-12-02 PROCEDURE — 99285 EMERGENCY DEPT VISIT HI MDM: CPT | Mod: 25

## 2018-12-02 PROCEDURE — 80329 ANALGESICS NON-OPIOID 1 OR 2: CPT

## 2018-12-02 PROCEDURE — 96366 THER/PROPH/DIAG IV INF ADDON: CPT

## 2018-12-02 PROCEDURE — 82962 GLUCOSE BLOOD TEST: CPT

## 2018-12-02 PROCEDURE — 63600175 PHARM REV CODE 636 W HCPCS: Performed by: EMERGENCY MEDICINE

## 2018-12-02 PROCEDURE — 80320 DRUG SCREEN QUANTALCOHOLS: CPT

## 2018-12-02 PROCEDURE — 99285 EMERGENCY DEPT VISIT HI MDM: CPT | Mod: ,,, | Performed by: EMERGENCY MEDICINE

## 2018-12-02 PROCEDURE — 25000003 PHARM REV CODE 250: Performed by: EMERGENCY MEDICINE

## 2018-12-02 PROCEDURE — 99233 SBSQ HOSP IP/OBS HIGH 50: CPT | Mod: ,,, | Performed by: PSYCHIATRY & NEUROLOGY

## 2018-12-02 PROCEDURE — 80307 DRUG TEST PRSMV CHEM ANLYZR: CPT

## 2018-12-02 PROCEDURE — 85610 PROTHROMBIN TIME: CPT

## 2018-12-02 PROCEDURE — 85025 COMPLETE CBC W/AUTO DIFF WBC: CPT

## 2018-12-02 PROCEDURE — 80053 COMPREHEN METABOLIC PANEL: CPT

## 2018-12-02 PROCEDURE — 96367 TX/PROPH/DG ADDL SEQ IV INF: CPT

## 2018-12-02 PROCEDURE — 93005 ELECTROCARDIOGRAM TRACING: CPT

## 2018-12-02 PROCEDURE — 20600001 HC STEP DOWN PRIVATE ROOM

## 2018-12-02 RX ORDER — LISINOPRIL 10 MG/1
10 TABLET ORAL DAILY
Status: DISCONTINUED | OUTPATIENT
Start: 2018-12-03 | End: 2018-12-02

## 2018-12-02 RX ORDER — POTASSIUM CHLORIDE 7.45 MG/ML
10 INJECTION INTRAVENOUS
Status: COMPLETED | OUTPATIENT
Start: 2018-12-02 | End: 2018-12-02

## 2018-12-02 RX ORDER — DULOXETIN HYDROCHLORIDE 60 MG/1
60 CAPSULE, DELAYED RELEASE ORAL DAILY
Status: DISCONTINUED | OUTPATIENT
Start: 2018-12-03 | End: 2018-12-02

## 2018-12-02 RX ORDER — DEXTROSE MONOHYDRATE, SODIUM CHLORIDE, AND POTASSIUM CHLORIDE 50; 1.49; 4.5 G/1000ML; G/1000ML; G/1000ML
INJECTION, SOLUTION INTRAVENOUS CONTINUOUS
Status: DISCONTINUED | OUTPATIENT
Start: 2018-12-02 | End: 2018-12-06 | Stop reason: HOSPADM

## 2018-12-02 RX ORDER — TRAZODONE HYDROCHLORIDE 50 MG/1
50 TABLET ORAL NIGHTLY
Status: DISCONTINUED | OUTPATIENT
Start: 2018-12-02 | End: 2018-12-02

## 2018-12-02 RX ORDER — ONDANSETRON 2 MG/ML
8 INJECTION INTRAMUSCULAR; INTRAVENOUS EVERY 6 HOURS PRN
Status: DISCONTINUED | OUTPATIENT
Start: 2018-12-02 | End: 2018-12-06 | Stop reason: HOSPADM

## 2018-12-02 RX ORDER — PANTOPRAZOLE SODIUM 40 MG/10ML
40 INJECTION, POWDER, LYOPHILIZED, FOR SOLUTION INTRAVENOUS DAILY
Status: DISCONTINUED | OUTPATIENT
Start: 2018-12-03 | End: 2018-12-03

## 2018-12-02 RX ORDER — SODIUM CHLORIDE 0.9 % (FLUSH) 0.9 %
3 SYRINGE (ML) INJECTION
Status: DISCONTINUED | OUTPATIENT
Start: 2018-12-02 | End: 2018-12-06 | Stop reason: HOSPADM

## 2018-12-02 RX ORDER — LORAZEPAM 2 MG/ML
0.5 INJECTION INTRAMUSCULAR 3 TIMES DAILY PRN
Status: DISCONTINUED | OUTPATIENT
Start: 2018-12-02 | End: 2018-12-06 | Stop reason: HOSPADM

## 2018-12-02 RX ORDER — HYDROMORPHONE HYDROCHLORIDE 1 MG/ML
0.5 INJECTION, SOLUTION INTRAMUSCULAR; INTRAVENOUS; SUBCUTANEOUS
Status: DISCONTINUED | OUTPATIENT
Start: 2018-12-02 | End: 2018-12-03

## 2018-12-02 RX ORDER — LORAZEPAM 2 MG/ML
2 INJECTION INTRAMUSCULAR
Status: COMPLETED | OUTPATIENT
Start: 2018-12-02 | End: 2018-12-02

## 2018-12-02 RX ORDER — GABAPENTIN 300 MG/1
300 CAPSULE ORAL 2 TIMES DAILY
Status: DISCONTINUED | OUTPATIENT
Start: 2018-12-02 | End: 2018-12-02

## 2018-12-02 RX ORDER — PANTOPRAZOLE SODIUM 40 MG/1
40 TABLET, DELAYED RELEASE ORAL DAILY
Status: DISCONTINUED | OUTPATIENT
Start: 2018-12-03 | End: 2018-12-02

## 2018-12-02 RX ADMIN — MAGNESIUM SULFATE HEPTAHYDRATE 1 G: 500 INJECTION, SOLUTION INTRAMUSCULAR; INTRAVENOUS at 08:12

## 2018-12-02 RX ADMIN — LORAZEPAM 2 MG: 2 INJECTION INTRAMUSCULAR; INTRAVENOUS at 08:12

## 2018-12-02 RX ADMIN — LACOSAMIDE 200 MG: 10 INJECTION INTRAVENOUS at 11:12

## 2018-12-02 RX ADMIN — CALCIUM GLUCONATE 1 G: 94 INJECTION, SOLUTION INTRAVENOUS at 09:12

## 2018-12-02 RX ADMIN — POTASSIUM CHLORIDE 10 MEQ: 7.46 INJECTION, SOLUTION INTRAVENOUS at 10:12

## 2018-12-02 RX ADMIN — POTASSIUM CHLORIDE 10 MEQ: 7.46 INJECTION, SOLUTION INTRAVENOUS at 09:12

## 2018-12-03 ENCOUNTER — HOSPITAL ENCOUNTER (OUTPATIENT)
Dept: RADIATION THERAPY | Facility: HOSPITAL | Age: 53
Discharge: HOME | End: 2018-12-03
Attending: RADIOLOGY
Payer: MEDICAID

## 2018-12-03 LAB
ALBUMIN SERPL BCP-MCNC: 3.1 G/DL
ALP SERPL-CCNC: 112 U/L
ALT SERPL W/O P-5'-P-CCNC: 51 U/L
ANION GAP SERPL CALC-SCNC: 11 MMOL/L
ANION GAP SERPL CALC-SCNC: 11 MMOL/L
AST SERPL-CCNC: 28 U/L
BASOPHILS # BLD AUTO: 0.01 K/UL
BASOPHILS NFR BLD: 0.2 %
BILIRUB SERPL-MCNC: 0.6 MG/DL
BUN SERPL-MCNC: 12 MG/DL
BUN SERPL-MCNC: 15 MG/DL
BUN SERPL-MCNC: 18 MG/DL (ref 6–30)
CALCIUM SERPL-MCNC: 7 MG/DL
CALCIUM SERPL-MCNC: 7.5 MG/DL
CHLORIDE SERPL-SCNC: 95 MMOL/L (ref 95–110)
CHLORIDE SERPL-SCNC: 99 MMOL/L
CHLORIDE SERPL-SCNC: 99 MMOL/L
CO2 SERPL-SCNC: 27 MMOL/L
CO2 SERPL-SCNC: 28 MMOL/L
CREAT SERPL-MCNC: 1 MG/DL
CREAT SERPL-MCNC: 1.1 MG/DL
CREAT SERPL-MCNC: 1.1 MG/DL (ref 0.5–1.4)
DIFFERENTIAL METHOD: ABNORMAL
EOSINOPHIL # BLD AUTO: 0.1 K/UL
EOSINOPHIL NFR BLD: 1 %
ERYTHROCYTE [DISTWIDTH] IN BLOOD BY AUTOMATED COUNT: 14.1 %
EST. GFR  (AFRICAN AMERICAN): >60 ML/MIN/1.73 M^2
EST. GFR  (AFRICAN AMERICAN): >60 ML/MIN/1.73 M^2
EST. GFR  (NON AFRICAN AMERICAN): 56.7 ML/MIN/1.73 M^2
EST. GFR  (NON AFRICAN AMERICAN): >60 ML/MIN/1.73 M^2
GLUCOSE SERPL-MCNC: 128 MG/DL
GLUCOSE SERPL-MCNC: 162 MG/DL
GLUCOSE SERPL-MCNC: 196 MG/DL (ref 70–110)
HCT VFR BLD AUTO: 32.7 %
HCT VFR BLD CALC: 35 %PCV (ref 36–54)
HGB BLD-MCNC: 11 G/DL
IMM GRANULOCYTES # BLD AUTO: 0.01 K/UL
IMM GRANULOCYTES NFR BLD AUTO: 0.2 %
LYMPHOCYTES # BLD AUTO: 0.3 K/UL
LYMPHOCYTES NFR BLD: 6 %
MAGNESIUM SERPL-MCNC: 0.9 MG/DL
MAGNESIUM SERPL-MCNC: 1.1 MG/DL
MCH RBC QN AUTO: 28.3 PG
MCHC RBC AUTO-ENTMCNC: 33.6 G/DL
MCV RBC AUTO: 84 FL
MONOCYTES # BLD AUTO: 0.6 K/UL
MONOCYTES NFR BLD: 12.5 %
NEUTROPHILS # BLD AUTO: 3.9 K/UL
NEUTROPHILS NFR BLD: 80.1 %
NRBC BLD-RTO: 0 /100 WBC
PHOSPHATE SERPL-MCNC: 3 MG/DL
PLATELET # BLD AUTO: 123 K/UL
PMV BLD AUTO: 9.6 FL
POC IONIZED CALCIUM: 0.75 MMOL/L (ref 1.06–1.42)
POC TCO2 (MEASURED): 25 MMOL/L (ref 23–29)
POTASSIUM BLD-SCNC: 3.1 MMOL/L (ref 3.5–5.1)
POTASSIUM SERPL-SCNC: 3.1 MMOL/L
POTASSIUM SERPL-SCNC: 3.3 MMOL/L
PROT SERPL-MCNC: 6.5 G/DL
RBC # BLD AUTO: 3.89 M/UL
SAMPLE: ABNORMAL
SODIUM BLD-SCNC: 136 MMOL/L (ref 136–145)
SODIUM SERPL-SCNC: 137 MMOL/L
SODIUM SERPL-SCNC: 138 MMOL/L
WBC # BLD AUTO: 4.81 K/UL

## 2018-12-03 PROCEDURE — 80048 BASIC METABOLIC PNL TOTAL CA: CPT

## 2018-12-03 PROCEDURE — 36415 COLL VENOUS BLD VENIPUNCTURE: CPT

## 2018-12-03 PROCEDURE — G8996 SWALLOW CURRENT STATUS: HCPCS | Mod: CH

## 2018-12-03 PROCEDURE — 80053 COMPREHEN METABOLIC PANEL: CPT

## 2018-12-03 PROCEDURE — 85025 COMPLETE CBC W/AUTO DIFF WBC: CPT

## 2018-12-03 PROCEDURE — 83735 ASSAY OF MAGNESIUM: CPT

## 2018-12-03 PROCEDURE — 99231 SBSQ HOSP IP/OBS SF/LOW 25: CPT | Mod: ,,, | Performed by: OBSTETRICS & GYNECOLOGY

## 2018-12-03 PROCEDURE — 99233 SBSQ HOSP IP/OBS HIGH 50: CPT | Mod: ,,, | Performed by: PSYCHIATRY & NEUROLOGY

## 2018-12-03 PROCEDURE — 25000003 PHARM REV CODE 250: Performed by: STUDENT IN AN ORGANIZED HEALTH CARE EDUCATION/TRAINING PROGRAM

## 2018-12-03 PROCEDURE — 84100 ASSAY OF PHOSPHORUS: CPT

## 2018-12-03 PROCEDURE — G8998 SWALLOW D/C STATUS: HCPCS | Mod: CH

## 2018-12-03 PROCEDURE — 92610 EVALUATE SWALLOWING FUNCTION: CPT

## 2018-12-03 PROCEDURE — 90832 PSYTX W PT 30 MINUTES: CPT | Mod: HP,,, | Performed by: PSYCHOLOGIST

## 2018-12-03 PROCEDURE — C9113 INJ PANTOPRAZOLE SODIUM, VIA: HCPCS | Performed by: STUDENT IN AN ORGANIZED HEALTH CARE EDUCATION/TRAINING PROGRAM

## 2018-12-03 PROCEDURE — 63600175 PHARM REV CODE 636 W HCPCS: Performed by: STUDENT IN AN ORGANIZED HEALTH CARE EDUCATION/TRAINING PROGRAM

## 2018-12-03 PROCEDURE — 77336 RADIATION PHYSICS CONSULT: CPT | Performed by: RADIOLOGY

## 2018-12-03 PROCEDURE — G8997 SWALLOW GOAL STATUS: HCPCS | Mod: CH

## 2018-12-03 PROCEDURE — 95816 EEG AWAKE AND DROWSY: CPT

## 2018-12-03 PROCEDURE — C9254 INJECTION, LACOSAMIDE: HCPCS | Performed by: STUDENT IN AN ORGANIZED HEALTH CARE EDUCATION/TRAINING PROGRAM

## 2018-12-03 PROCEDURE — 20600001 HC STEP DOWN PRIVATE ROOM

## 2018-12-03 PROCEDURE — 95816 EEG AWAKE AND DROWSY: CPT | Mod: 26,,, | Performed by: PSYCHIATRY & NEUROLOGY

## 2018-12-03 RX ORDER — MAGNESIUM SULFATE HEPTAHYDRATE 40 MG/ML
1 INJECTION, SOLUTION INTRAVENOUS CONTINUOUS
Status: DISCONTINUED | OUTPATIENT
Start: 2018-12-03 | End: 2018-12-03

## 2018-12-03 RX ORDER — DULOXETIN HYDROCHLORIDE 60 MG/1
60 CAPSULE, DELAYED RELEASE ORAL DAILY
Status: DISCONTINUED | OUTPATIENT
Start: 2018-12-03 | End: 2018-12-06 | Stop reason: HOSPADM

## 2018-12-03 RX ORDER — MAGNESIUM SULFATE HEPTAHYDRATE 40 MG/ML
2 INJECTION, SOLUTION INTRAVENOUS ONCE
Status: COMPLETED | OUTPATIENT
Start: 2018-12-03 | End: 2018-12-03

## 2018-12-03 RX ORDER — PANTOPRAZOLE SODIUM 40 MG/1
40 TABLET, DELAYED RELEASE ORAL DAILY
Status: DISCONTINUED | OUTPATIENT
Start: 2018-12-04 | End: 2018-12-06 | Stop reason: HOSPADM

## 2018-12-03 RX ORDER — HYDROCODONE BITARTRATE AND ACETAMINOPHEN 5; 325 MG/1; MG/1
1 TABLET ORAL EVERY 6 HOURS PRN
Status: DISCONTINUED | OUTPATIENT
Start: 2018-12-03 | End: 2018-12-06 | Stop reason: HOSPADM

## 2018-12-03 RX ORDER — ACETAMINOPHEN 325 MG/1
650 TABLET ORAL EVERY 6 HOURS PRN
Status: DISCONTINUED | OUTPATIENT
Start: 2018-12-03 | End: 2018-12-06 | Stop reason: HOSPADM

## 2018-12-03 RX ORDER — TRAZODONE HYDROCHLORIDE 50 MG/1
50 TABLET ORAL NIGHTLY
Status: DISCONTINUED | OUTPATIENT
Start: 2018-12-03 | End: 2018-12-06 | Stop reason: HOSPADM

## 2018-12-03 RX ORDER — GABAPENTIN 300 MG/1
300 CAPSULE ORAL 2 TIMES DAILY
Status: DISCONTINUED | OUTPATIENT
Start: 2018-12-03 | End: 2018-12-06 | Stop reason: HOSPADM

## 2018-12-03 RX ORDER — LISINOPRIL 10 MG/1
10 TABLET ORAL DAILY
Status: DISCONTINUED | OUTPATIENT
Start: 2018-12-03 | End: 2018-12-06 | Stop reason: HOSPADM

## 2018-12-03 RX ORDER — POTASSIUM CHLORIDE 7.45 MG/ML
10 INJECTION INTRAVENOUS
Status: COMPLETED | OUTPATIENT
Start: 2018-12-03 | End: 2018-12-03

## 2018-12-03 RX ORDER — HYDROCODONE BITARTRATE AND ACETAMINOPHEN 10; 325 MG/1; MG/1
1 TABLET ORAL EVERY 6 HOURS PRN
Status: DISCONTINUED | OUTPATIENT
Start: 2018-12-03 | End: 2018-12-06 | Stop reason: HOSPADM

## 2018-12-03 RX ADMIN — SODIUM CHLORIDE 100 MG: 9 INJECTION, SOLUTION INTRAVENOUS at 10:12

## 2018-12-03 RX ADMIN — LISINOPRIL 10 MG: 10 TABLET ORAL at 03:12

## 2018-12-03 RX ADMIN — DEXTROSE MONOHYDRATE, SODIUM CHLORIDE, AND POTASSIUM CHLORIDE: 50; 4.5; 1.49 INJECTION, SOLUTION INTRAVENOUS at 08:12

## 2018-12-03 RX ADMIN — DEXTROSE MONOHYDRATE, SODIUM CHLORIDE, AND POTASSIUM CHLORIDE: 50; 4.5; 1.49 INJECTION, SOLUTION INTRAVENOUS at 12:12

## 2018-12-03 RX ADMIN — MAGNESIUM SULFATE IN WATER 2 G: 40 INJECTION, SOLUTION INTRAVENOUS at 08:12

## 2018-12-03 RX ADMIN — DULOXETINE 60 MG: 60 CAPSULE, DELAYED RELEASE ORAL at 03:12

## 2018-12-03 RX ADMIN — TRAZODONE HYDROCHLORIDE 50 MG: 50 TABLET ORAL at 09:12

## 2018-12-03 RX ADMIN — PANTOPRAZOLE SODIUM 40 MG: 40 INJECTION, POWDER, FOR SOLUTION INTRAVENOUS at 08:12

## 2018-12-03 RX ADMIN — POTASSIUM CHLORIDE 10 MEQ: 10 INJECTION, SOLUTION INTRAVENOUS at 02:12

## 2018-12-03 RX ADMIN — CALCIUM GLUCONATE 1000 MG: 94 INJECTION, SOLUTION INTRAVENOUS at 11:12

## 2018-12-03 RX ADMIN — POTASSIUM CHLORIDE 10 MEQ: 10 INJECTION, SOLUTION INTRAVENOUS at 01:12

## 2018-12-03 RX ADMIN — SODIUM CHLORIDE 100 MG: 9 INJECTION, SOLUTION INTRAVENOUS at 11:12

## 2018-12-03 RX ADMIN — GABAPENTIN 300 MG: 300 CAPSULE ORAL at 09:12

## 2018-12-03 NOTE — PT/OT/SLP EVAL
Speech Language Pathology Evaluation  Bedside Swallow    Patient Name:  Edita Riley   MRN:  [id number]   [id number]    Admitting Diagnosis: Seizure-like activity    Recommendations:                 General Recommendations:  Speech language evaluation and Cognitive-linguistic evaluation; GI consult should patient continue to report globus sensation in chest when eating/drinking  Diet recommendations:  Regular, Thin   Aspiration Precautions: Standard aspiration precautions   General Precautions: Standard,    Communication strategies:  none    History:     Past Medical History:   Diagnosis Date    Anxiety     Cervical cancer 2014    Chronic back pain     Depression     Diarrhea due to malabsorption 11/14/2018    Fibromyalgia     GERD (gastroesophageal reflux disease)     Hiatal hernia 2014    History of cervical cancer 10/11/2018    Hx of psychiatric care     Cymbalta, trazodone    Hypertension     Hypomagnesemia 11/21/2018    Lactose intolerance     Metastatic squamous cell carcinoma to lymph node 10/11/2018    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 drug 11/14/2018    Osteoarthritis of back     Psychiatric problem     Stroke 1972       Past Surgical History:   Procedure Laterality Date    BILATERAL OOPHORECTOMY  2015    CHOLECYSTECTOMY  11/09/2016    Done at [facility], path showed chronic cholecystitis and gallstones    CHOLECYSTECTOMY-LAPAROSCOPIC N/A 11/9/2016    Performed by Joshua Goldberg, MD at [facility] OR 2ND FLR    cold knife conization  2014    COLONOSCOPY  2014    COLONOSCOPY N/A 10/24/2016    at [facility], Dr Thomas    COLONOSCOPY N/A 5/18/2018    Procedure: COLONOSCOPY;  Surgeon: Arden Gutiérrez MD;  Location: [facility] (4TH FLR);  Service: Endoscopy;  Laterality: N/A;    COLONOSCOPY N/A 5/18/2018    Performed by Arden Gutiérrez MD at [facility] (4TH FLR)    COLONOSCOPY N/A 10/24/2016    Performed by Arden Gutiérrez MD at [facility] (4TH FLR)    ESOPHAGOGASTRODUODENOSCOPY  2014     ESOPHAGOGASTRODUODENOSCOPY (EGD) N/A 10/24/2016    Performed by Arden Gutiérrez MD at [facility] ENDO (4TH FLR)    HYSTERECTOMY  2014    [facility] for cervical cancer    LYMPHADENECTOMY, RETROPERITONEUM Right 9/17/2018    Performed by Sebas Reed MD at [facility] OR 2ND FLR    OOPHORECTOMY      RETROPERITONEAL LYMPHADENECTOMY Right 9/17/2018    Procedure: LYMPHADENECTOMY, RETROPERITONEUM;  Surgeon: Sebas Reed MD;  [facility];  Service: General;  Laterality: Right;  ILIAC       Social History: Patient lives with  and daughter (19).    Chest X-Rays: No definite acute cardiopulmonary finding identified on this hypoventilatory single-view.    Brain MRI: No evidence of diffusion restriction suggest acute infarction.  No acute intracranial process on this limited examination.  Unremarkable MRA of the intracranial circulation.    Prior diet: reg/thin    Subjective     Patient awake and cooperative.   Patient goals: to eat     Pain/Comfort:  · Pain Rating 1: 0/10    Objective:     Oral Musculature Evaluation  · Oral Musculature: left weakness, facial asymmetry present(asymmetry is baseline per patient)  · Dentition: present and adequate  · Mucosal Quality: good  · Mandibular Strength and Mobility: WFL  · Oral Labial Strength and Mobility: impaired retraction  · Lingual Strength and Mobility: WFL  · Buccal Strength and Mobility: WFL  · Volitional Cough: elicited  · Volitional Swallow: timely  · Voice Prior to PO Intake: clear; hoarse vocal quality improving as session continued    Bedside Swallow Eval:   Consistencies Assessed:  · Thin liquids ips of water via cup and straw x 6 ounces  · Puree tsp bites of apple sauce (whole cup)  · Solids bites of julieta cracker (whole packet)     Oral Phase:   · WFL     Pharyngeal Phase:   · no overt clinical signs/symptoms of aspiration  · no overt clinical signs/symptoms of pharyngeal dysphagia    Patient reporting inconsistent globus sensation and pointing  below sternal notch as indication for globus sensation.      Compensatory Strategies  · None     Treatment: SLP provided patient education on SLP role, s/s and risks of aspiraiton, safe swallow precautions, and POC. Patient v/u of all discussed. White board updated.     Noted aphasia and dysarthria noted in chart review. SLP screened patient's speech, language, and cognition. Patient presents with inconsistent hoarse vocal quality which was noted to improve as session continued. She reports inconsistent slurred speech which was not appreciated throughout ST session. Patient reporting difficulty finding words. She reports her speech, language, and cognition are not at baseline. With the exception of hoarse vocal quality, no speech and language impairments appreciated, however, patient may benefit from a Djsxav-Mwdksztw-Awyfnkmbk Evaluation to assess for further ST needs.     Assessment:     Edita Riley is a 53 y.o. female with no overt s/s of aspiration or difficulty swallowing all consistencies trialed. Patient would benefit from a Tyyqke-Rzcqdskg-Rfufaydxy Evaluation 2/2 patient report of word finding difficulties and impaired speech.    Goals:   Multidisciplinary Problems     SLP Goals        Problem: SLP Goal    Goal Priority Disciplines Outcome   SLP Goal     SLP Ongoing (interventions implemented as appropriate)   Description:  Speech Therapy Short Term Goals  Goal expected to be met by 12/10  1. Pt will participate in a bedside swallow study to determine the safest and least restrictive possible po diet with possible updated goals to follow pending results. -MET  2. Pt will participate in a speech, language, and cognitive evaluation with possible updated goals to follow pending results.                        Plan:     · Patient to be seen:  3 x/week   · Plan of Care expires:  01/01/19  · Plan of Care reviewed with:  patient   · SLP Follow-Up:  Yes       Discharge recommendations:  home,  outpatient speech therapy(OP ST vs. no needs)   Barriers to Discharge:  None per ST discipline    Time Tracking:     SLP Treatment Date:   12/03/18  Speech Start Time:  1143  Speech Stop Time:  1200     Speech Total Time (min):  17 min    Billable Minutes: Eval Swallow and Oral Function 17    VENUS Gill, CCC-SLP   Pager: [phone]  12/03/2018

## 2018-12-03 NOTE — CONSULTS
"[facility]  Psychology  Progress Note  Individual Psychotherapy (PhD/LCSW)    Patient Name: Edita Riley  MRN: [id number]    Patient Class: IP- Inpatient  Admission Date: 12/2/2018  Hospital Length of Stay: 1 days  Attending Physician: Refugio Lemon MD  Primary Care Provider: Hammad Lackey MD    Therapeutic Intervention: Met with patient.  Inpatient/Partial Hospital - Supportive psychotherapy 30 min - CPT Code 40849    Chief Complaint/Reason for Encounter: depression, adaptation to disease and treatment     Interval History and Content of Current Session: Patient previously assessed by me as an outpatient on 10/17/18 (see assessment note).  She has not followed up with recommendations for outpatient psychotherapy or evaluation by a psychiatrist. Patient states her cancer treatments have consumed her attention and caused n/v/d which have prevented her from following through with care plan. She continues to report significant depressed mood, anhedonia, psychomotor retardation, poor concentration and social isolation. She continues to experience mood congruent auditory hallucinations (most recently last week, telling her to "just give up").  She reports suicidal ideation without intent or plan. Patient is willing to participate in follow-up visits both inpatient and outpatient.  She is willing to see psychiatry as an inpatient, if possible.  She continues to take her Cymbalta and Desyrel as prescribed with minimal benefit (x 1 year? - initially prescribed in [place]).    Risk Parameters:  Patient reports suicidal ideation: without intent or plan  Patient reports no homicidal ideation  Patient reports no self-injurious behavior  Patient reports no violent behavior    Verbal Deficits: None ; mild hoarseness; no word finding difficulties noted during this visit    Patient's response to intervention:  The patient's response to intervention is accepting.    Progress toward goals and other " mental status changes:  The patient's progress toward goals is not progressing.    Diagnostic Impression - Plan:     Severe episode of recurrent major depressive disorder, with psychotic features    Patient with 5 year history of depression, currently at its worst       Ongoing depression with mood congruent auditory hallucinations and suicidal ideation without intent or plan.  Patient treated x 1 year (approximately) with current psychotropic regimen (Cymbalta, Desyrel) with minimal perceived benefit.    Patient with limited ability to follow through with outpatient psychiatric referral- May benefit from inpatient psychiatry consult, if medically appropriate.    Patient is willing to follow-up with me for supportive therapy inpatient and outpatient, as able.           Treatment Plan:  · Target symptoms: depression and anxiety  · Why chosen therapy is appropriate versus another modality: relevant to diagnosis, evidence based practice  · Outcome monitoring methods: self-report, checklist/rating scale  · Therapeutic intervention type: behavior modifying psychotherapy, supportive psychotherapy    Plan:  individual psychotherapy and consult psychiatrist for medication evaluation    Length of Service (minutes): 30    Enoc Martinez, PhD  Psychology  [facility]

## 2018-12-03 NOTE — ASSESSMENT & PLAN NOTE
Hypomagnesemia, hypokalemia and hypocalcemia on admit  Provoking factors for seizure  --management per primary team

## 2018-12-03 NOTE — PROGRESS NOTES
[facility]  Gynecologic Oncology  Progress Note      Patient Name: Edita Riley  MRN: [id number]  Admission Date: 12/2/2018  Hospital Length of Stay: 1 days  Attending Provider: Refugio Lemon MD  Primary Care Provider: Hammad Lackey MD  Principal Problem: Seizure-like activity    Follow-up For: * No surgery found *  Post-Operative Day:    Subjective:      History of Present Illness:  Pt is a 53 year old female with metastatic cervical cancer who presented to the ED with aphasia and hypertonic extremities. Per patient history, she was sitting at home and had sudden onset fist clenching/muscle spasms and was unable to speak. Says the symptoms are starting to improve but still present - speech continues to be slurred. In ED, stroke code was called - imaging showed no evidence of stroke. Labs did show low magnesium, calcium, and potassium, however. Gyn Onc was consulted for further management. Pt reports that nothing like this has happened to her before. Is on chemotherapy and radiation for her recurrent cancer. (She is s/p surgery in 2015.) S/p 4 cycles of cisplatin (last 11/28.) Patient has experienced vomiting for the past few days. Has not been able to eat/drink much. Mild abdominal pain. No vaginal bleeding. Should be noted that patient was referred to social work last week for concerns of self-harm behavior.     Hospital Course:  12/02/2018 Admitted to gyn onc service for seizure-like activity. [facility] neuro consult ruled out stroke. Gen neuro consult placed. Started on lacosamide per [facility] neuro recs. Correcting electrolyte abnormalities.   12/03/2018 Pt reports some improvement in symptoms but continued difficulty with speech and hypertonia. On tele monitoring.    Oncology Treatment Plan:   OP GYN CISPLATIN (Weekly)    Oncology History:   Her oncologic history is:  Referring physician:  Dr. Sebas Reed  Reason for referral:  Incisional biopsy of retroperitoneal periaortic node that  shows squamous cell carcinoma consistent with gynecologic primary     Aug 2018: chronic abdominal pain referred for consideration of biopsy of a 2 cm right common iliac artery bifurcation node  Node was evident on scan in 4/18 and again in 7/18 - no change  Patient's symptoms are non specific, diffuse, have not resulted in weight loss  Patient already carries a diagnosis of diffuse pain syndromes including fibromyalgia     Aug 2018: PET scan Right common iliac lymph node suspicious for malignancy.  This could be benign or malignant lymphoproliferative disorder metastatic disease.  This is an a risky position for percutaneous biopsy.      Sept 17, 2018: Underwent retroperitoneal approach for incisional biopsy of right common iliac LN. Pathology showed:  Supplemental Diagnosis  METASTATIC HIGH-GRADE CARCINOMA GROWING WITH SQUAMOUS CELL FEATURES. THE RESULTS OF  ADDITIONAL IMMUNOSTAINS ARE AS FOLLOWS: CK5/6, P63 AND CK7 ARE POSITIVE. TTF, S100 AND  CK20 ARE ALL NEGATIVE. THE CONTROLS STAIN APPROPRIATELY.  NOTE: GIVEN THESE RESULTS THIS MAY REPRESENT A PRIMARY IN THE URINARY OR LOWER GYN  TRACTS. DR. JAMESON HAS ALSO REVIEWED THIS CASE AND CONCURS WITH THE DIAGNOSIS.     Pap:  June 6, 2018:  Normal     She has a history of hysterectomy for cervical cancer in 2015 in [place], [place]. Hyst was for abnormal pap. Incidental finding of cervical cancer on the hyst specimen. She does not know any other details.         Past Medical History:   Diagnosis Date    Anxiety     Cervical cancer 2014    Chronic back pain     Depression     Diarrhea due to malabsorption 11/14/2018    Fibromyalgia     GERD (gastroesophageal reflux disease)     Hiatal hernia 2014    History of cervical cancer 10/11/2018    Hx of psychiatric care     Cymbalta, trazodone    Hypertension     Hypomagnesemia 11/21/2018    Lactose intolerance     Metastatic squamous cell carcinoma to lymph node 10/11/2018    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 drug  11/14/2018    Osteoarthritis of back     Psychiatric problem     Stroke 1972     Past Surgical History:   Procedure Laterality Date    BILATERAL OOPHORECTOMY  2015    CHOLECYSTECTOMY  11/09/2016    Done at [facility], path showed chronic cholecystitis and gallstones    CHOLECYSTECTOMY-LAPAROSCOPIC N/A 11/9/2016    Performed by Joshua Goldberg, [facility] at [facility] OR 2ND FLR    cold knife conization  2014    COLONOSCOPY  2014    COLONOSCOPY N/A 10/24/2016    at [facility], Dr Thomas    COLONOSCOPY N/A 5/18/2018    Procedure: COLONOSCOPY;  Surgeon: Arden Gutiérrez MD;  Location: [facility] (4TH FLR);  Service: Endoscopy;  Laterality: N/A;    COLONOSCOPY N/A 5/18/2018    Performed by Arden Gutiérrez MD at [facility] (4TH FLR)    COLONOSCOPY N/A 10/24/2016    Performed by Arden Gutiérrez MD at [facility] (4TH FLR)    ESOPHAGOGASTRODUODENOSCOPY  2014    ESOPHAGOGASTRODUODENOSCOPY (EGD) N/A 10/24/2016    Performed by Arden Gutiérrez MD at [facility] (4TH FLR)    HYSTERECTOMY  2014    [facility] for [facility]    LYMPHADENECTOMY, RETROPERITONEUM Right 9/17/2018    Performed by Sebas Reed MD at [facility] OR 2ND FLR    OOPHORECTOMY      RETROPERITONEAL LYMPHADENECTOMY Right 9/17/2018    Procedure: LYMPHADENECTOMY, RETROPERITONEUM;  Surgeon: Sebas Reed MD;  Location: [facility] OR [address] [place] [address];  Service: General;  Laterality: Right;  ILIAC     Family History     Problem Relation (Age of Onset)    Breast cancer Maternal Aunt    Cancer Father, Mother    Cervical cancer Mother    Colon cancer Father    Drug abuse Daughter, Daughter    Esophageal cancer Father    Heart disease Sister, Maternal Grandmother    Suicide Daughter        Tobacco Use    Smoking status: Never Smoker    Smokeless tobacco: Never Used   Substance and Sexual Activity    Alcohol use: No     Alcohol/week: 0.0 oz    Drug use: No    Sexual activity: Yes     Partners: Male     Birth control/protection: None     Comment:  19 years since 1999        PTA Medications   Medication Sig    arm brace (NEOPRENE WRIST SPLINT SUPPORT) Misc 1 Units by Misc.(Non-Drug; Combo Route) route every evening.    cholecalciferol, vitamin D3, 2,000 unit Cap Take 1 capsule by mouth once daily.    conjugated estrogens (PREMARIN) vaginal cream Place 0.5 g vaginally twice a week.    cyclobenzaprine (FLEXERIL) 10 MG tablet TAKE 1 TABLET (10 MG TOTAL) BY MOUTH NIGHTLY AS NEEDED FOR MUSCLE SPASMS.    diphenoxylate-atropine 2.5-0.025 mg (LOMOTIL) 2.5-0.025 mg per tablet Take 1 after each bowel movement. Up to 8 per day.    DULoxetine (CYMBALTA) 60 MG capsule TAKE 1 CAPSULE (60 MG TOTAL) BY MOUTH ONCE DAILY.    estradiol (ESTRACE) 1 MG tablet Take 1 mg by mouth once daily.    gabapentin (NEURONTIN) 300 MG capsule TAKE 1 CAPSULE (300 MG TOTAL) BY MOUTH 2 (TWO) TIMES DAILY.    lisinopril 10 MG tablet TAKE 1 TABLET (10 MG TOTAL) BY MOUTH ONCE DAILY.    magnesium oxide (MAG-OX) 400 mg (241.3 mg magnesium) tablet Take 1 tablet (400 mg total) by mouth once daily.    meclizine (ANTIVERT) 25 mg tablet Take 1 tablet (25 mg total) by mouth 3 (three) times daily as needed for Dizziness.    omeprazole (PRILOSEC) 40 MG capsule Take 1 capsule (40 mg total) by mouth once daily.    oxyCODONE-acetaminophen (PERCOCET) 5-325 mg per tablet Take 1 tablet by mouth every 4 (four) hours as needed.    traMADol (ULTRAM) 50 mg tablet Take 50 mg by mouth every 6 (six) hours as needed for Pain.    traZODone (DESYREL) 50 MG tablet Take 1 tablet (50 mg total) by mouth every evening.       Review of patient's allergies indicates:   Allergen Reactions    Bee sting [allergen ext-venom-honey bee]      Rash      Grass pollen-bermuda, standard      rash       Review of Systems   Constitutional: Negative for chills and fever.   Eyes: Negative for visual disturbance.   Respiratory: Negative for shortness of breath.    Cardiovascular: Positive for chest pain (chronic).   Gastrointestinal: Positive for  abdominal pain. Negative for nausea and vomiting.   Genitourinary: Negative for vaginal bleeding and vaginal discharge.   Neurological: Negative for headaches.        Slurred speech, hypertonic upper limbs      Objective:     Vital Signs (Most Recent):  Temp: 97.8 °F (36.6 °C) (12/03/18 0443)  Pulse: 83 (12/03/18 0443)  Resp: 16 (12/03/18 0443)  BP: (!) 141/84 (12/03/18 0443)  SpO2: 98 % (12/03/18 0443) Vital Signs (24h Range):  Temp:  [97.8 °F (36.6 °C)-98.1 °F (36.7 °C)] 97.8 °F (36.6 °C)  Pulse:  [] 83  Resp:  [16-18] 16  SpO2:  [98 %-100 %] 98 %  BP: (126-141)/(80-94) 141/84     Weight: 100.5 kg (221 lb 9 oz)  Body mass index is 33.69 kg/m².    Physical Exam:   Constitutional: She is oriented to person, place, and time. She appears well-developed and well-nourished. No distress.    HENT:   Head: Normocephalic and atraumatic.       Pulmonary/Chest: Effort normal. No respiratory distress.        Abdominal: Soft. She exhibits no distension. There is no tenderness. There is no rebound.             Musculoskeletal: Moves all extremeties.       Neurological: She is alert and oriented to person, place, and time.   Negative chvostek sign, continued hypertonia throughout, continued difficulty with speaking but appears to be improving    Skin: Skin is warm and dry. She is not diaphoretic.    Psychiatric: She has a normal mood and affect. Her behavior is normal. Judgment and thought content normal.       Laboratory:  CBC:   Recent Labs   Lab 12/02/18 1926   WBC 5.75   HGB 11.5*   HCT 35.0*   *    and CMP:   Recent Labs   Lab 12/02/18 1926      K 3.2*   CL 96   CO2 24   *   BUN 19   CREATININE 1.2   CALCIUM 7.0*   PROT 6.9   ALBUMIN 3.4*   BILITOT 0.6   ALKPHOS 120   AST 31   ALT 54*   ANIONGAP 17*   EGFRNONAA 51.0*     Mg: .7    Diagnostic Results:  MRI: No evidence of diffusion restriction suggest acute infarction. No acute intracranial process on this limited examination. Unremarkable MRA of  the intracranial circulation.  CT: No convincing acute intracranial abnormalities, noting motion limited exam.  CXR: No definite acute cardiopulmonary finding identified on this hypoventilatory single-view.    Assessment/Plan:     * Seizure-like activity    --underwent stroke eval per [facility] neuro: low suspicion for stroke  --episode may be related to seizure: general neurology consult placed and paged  ----will start lacosamide empirically per [facility] neuro recs  ----pt may need EEG. Will defer to gen neuro.  --electrolyte abnormalities noted on admission: will replace Ca2+, Mg, K and recheck in am  --holding all PO meds until neuro eval. May need speech eval prior to resuming PO.     Electrolyte disorder    --Mg .7, Ca2+ 7.0 (corrected - 7.5), K 3.2  --replacement ordered by ED physician  --am labs pending. 2g additional MgSO4 ordered for this am.   --continue repletion PRN     Metastatic squamous cell carcinoma to lymph node    --h/o cervical cancer s/p surgery in 2015  --now on cisplatin (s/p 4 cycles, last 11/28) and radiation (M-F)  --no evidence of brain mets on imaging, consider MRI w contrast to confirm     Gastroesophageal reflux disease with esophagitis    --IV protonix. Switch to PO once tolerating.     Depression    --h/o suicide attempt, recent sw encounter for selfharm behavior  --death of daughter by suicide earlier this year  --no suicidal ideation at this time     Essential hypertension    --BP: (126-141)/(80-94) 141/84  --holding home lisinopril until able to safely take PO meds         VTE Risk Mitigation (From admission, onward)        Ordered     Place sequential compression device  Until discontinued      12/02/18 2111     IP VTE HIGH RISK PATIENT  Once      12/02/18 2111          Pelon Burden MD  Gynecologic Oncology  [facility]

## 2018-12-03 NOTE — ASSESSMENT & PLAN NOTE
Patient found to have difficulty speaking with rigid extremities, clenched hands and UE convulsions upon awakening from nap yesterday at 6 pm. No previous hx of seizures, head trauma or CNS infections.   Of note, she has been sleep deprived with decreased PO intake over past few days after recent chemo 11/28. Stroke code called in ED and MRI/MRA unremarkable for acute intracranial pathology. She was empirically started on Vimpat 100 mg BID after 200 mg load.     >>Suspect provoked seizure, but will complete new onset seizure workup with repeat imaging and EEG    Recommendations:  --repeat MRI brain W WO contrast (epilepsy protocol)   --routine EEG  --serum paraneoplastic panel   --pending results of above, will decide whether continuation of Vimpat is necessary

## 2018-12-03 NOTE — PLAN OF CARE
Problem: SLP Goal  Goal: SLP Goal  Speech Therapy Short Term Goals  Goal expected to be met by 12/10  1. Pt will participate in a bedside swallow study to determine the safest and least restrictive possible po diet with possible updated goals to follow pending results. -MET  2. Pt will participate in a speech, language, and cognitive evaluation with possible updated goals to follow pending results.      Outcome: Ongoing (interventions implemented as appropriate)  Bedside Swallow Study completed. Recommend: regular diet, thin liquids, standard aspiration precautions. Patient reporting speech and language impairments and would benefit from a Nncmtn-Vmsyrobq-Oqbrxqqoj Evaluation.   EDGARDO Morley., CCC-SLP  Pager: [phone]  12/03/2018

## 2018-12-03 NOTE — HOSPITAL COURSE
12/02/2018 Admitted to gyn onc service for seizure-like activity. [facility] neuro consult ruled out stroke. Gen neuro consult placed. Started on lacosamide per [facility] neuro recs. Correcting electrolyte abnormalities.   12/03/2018 Pt reports some improvement in symptoms but continued difficulty with speech and hypertonia. On tele monitoring. Was evaluated by neuro and heme/onc psych. Desires psychiatry consult to discuss medications. EEG performed, awaiting results.  12/04/2018 [place] dc'ed per neuro recs. Am lyte replacement - pm labs wnl.   12/05/2018 Starting abilify per psych recs. MRI normal. PT/OT consults completed - rec outpatient therapy.  12/06/2018 Stable for dc. Home today w outpatient f/u in neuro, psychology, psychiatry, speech, PT, OT.

## 2018-12-03 NOTE — SUBJECTIVE & OBJECTIVE
"Therapeutic Intervention: Met with patient.  Inpatient/Partial Hospital - Supportive psychotherapy 30 min - CPT Code 44022    Chief Complaint/Reason for Encounter: depression, adaptation to disease and treatment     Interval History and Content of Current Session: Patient previously assessed by me as an outpatient on 10/17/18 (see assessment note).  She has not followed up with recommendations for outpatient psychotherapy or evaluation by a psychiatrist. Patient states her cancer treatments have consumed her attention and caused n/v/d which have prevented her from following through with care plan. She continues to report significant depressed mood, anhedonia, psychomotor retardation, poor concentration and social isolation. She continues to experience mood congruent auditory hallucinations (most recently last week, telling her to "just give up").  She reports suicidal ideation without intent or plan. Patient is willing to participate in follow-up visits both inpatient and outpatient.  She is willing to see psychiatry as an inpatient, if possible.  She continues to take her Cymbalta and Desyrel as prescribed with minimal benefit (x 1 year? - initially prescribed in [place]).    Risk Parameters:  Patient reports suicidal ideation: without intent or plan  Patient reports no homicidal ideation  Patient reports no self-injurious behavior  Patient reports no violent behavior    Verbal Deficits: None ; mild hoarseness; no word finding difficulties noted during this visit    Patient's response to intervention:  The patient's response to intervention is accepting.    Progress toward goals and other mental status changes:  The patient's progress toward goals is not progressing.  "

## 2018-12-03 NOTE — SUBJECTIVE & OBJECTIVE
Oncology Treatment Plan:   OP GYN CISPLATIN (Weekly)    Oncology History:   Her oncologic history is:  Referring physician:  Dr. Sebas Reed  Reason for referral:  Incisional biopsy of retroperitoneal periaortic node that shows squamous cell carcinoma consistent with gynecologic primary     Aug 2018: chronic abdominal pain referred for consideration of biopsy of a 2 cm right common iliac artery bifurcation node  Node was evident on scan in 4/18 and again in 7/18 - no change  Patient's symptoms are non specific, diffuse, have not resulted in weight loss  Patient already carries a diagnosis of diffuse pain syndromes including fibromyalgia     Aug 2018: PET scan Right common iliac lymph node suspicious for malignancy.  This could be benign or malignant lymphoproliferative disorder metastatic disease.  This is an a risky position for percutaneous biopsy.      Sept 17, 2018: Underwent retroperitoneal approach for incisional biopsy of right common iliac LN. Pathology showed:  Supplemental Diagnosis  METASTATIC HIGH-GRADE CARCINOMA GROWING WITH SQUAMOUS CELL FEATURES. THE RESULTS OF  ADDITIONAL IMMUNOSTAINS ARE AS FOLLOWS: CK5/6, P63 AND CK7 ARE POSITIVE. TTF, S100 AND  CK20 ARE ALL NEGATIVE. THE CONTROLS STAIN APPROPRIATELY.  NOTE: GIVEN THESE RESULTS THIS MAY REPRESENT A PRIMARY IN THE URINARY OR LOWER GYN  TRACTS. DR. JAMESON HAS ALSO REVIEWED THIS CASE AND CONCURS WITH THE DIAGNOSIS.     Pap:  June 6, 2018:  Normal     She has a history of hysterectomy for cervical cancer in 2015 in [place], [place]. Hyst was for abnormal pap. Incidental finding of cervical cancer on the hyst specimen. She does not know any other details.         Past Medical History:   Diagnosis Date    Anxiety     Cervical cancer 2014    Chronic back pain     Depression     Diarrhea due to malabsorption 11/14/2018    Fibromyalgia     GERD (gastroesophageal reflux disease)     Hiatal hernia 2014    History of cervical cancer 10/11/2018    Hx of  psychiatric care     Cymbalta, trazodone    Hypertension     Hypomagnesemia 11/21/2018    Lactose intolerance     Metastatic squamous cell carcinoma to lymph node 10/11/2018    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 drug 11/14/2018    Osteoarthritis of back     Psychiatric problem     Stroke 1972     Past Surgical History:   Procedure Laterality Date    BILATERAL OOPHORECTOMY  2015    CHOLECYSTECTOMY  11/09/2016    Done at [facility], path showed chronic cholecystitis and gallstones    CHOLECYSTECTOMY-LAPAROSCOPIC N/A 11/9/2016    Performed by Joshua Goldberg, [facility] at [facility] OR 2ND FLR    cold knife conization  2014    COLONOSCOPY  2014    [facility]    COLONOSCOPY N/A 5/18/2018    Procedure: COLONOSCOPY;  Surgeon: Arden Gutiérrez MD;  Location: [facility] (4TH FLR);  Service: Endoscopy;  Laterality: N/A;    COLONOSCOPY N/A 5/18/2018    Performed by Arden Gutiérrez MD at [facility] ENDO (4TH FLR)    COLONOSCOPY N/A 10/24/2016    Performed by Arden Gutiérrez MD at [facility] (4TH FLR)    ESOPHAGOGASTRODUODENOSCOPY  2014    ESOPHAGOGASTRODUODENOSCOPY (EGD) N/A 10/24/2016    Performed by Arden Gutiérrez MD at [facility] (4TH FLR)    HYSTERECTOMY  2014    [facility] for [facility]    LYMPHADENECTOMY, RETROPERITONEUM Right 9/17/2018    Performed by Sebas Reed MD at [facility] OR 2ND FLR    OOPHORECTOMY      RETROPERITONEAL LYMPHADENECTOMY Right 9/17/2018    Procedure: LYMPHADENECTOMY, RETROPERITONEUM;  Surgeon: Sebas Reed MD;  Location: [facility] OR [address] [place] [address];  Service: General;  Laterality: Right;  ILIAC     Family History     Problem Relation (Age of Onset)    Breast cancer Maternal Aunt    Cancer Father, Mother    Cervical cancer Mother    Colon cancer Father    Drug abuse Daughter, Daughter    Esophageal cancer Father    Heart disease Sister, Maternal Grandmother    Suicide Daughter        Tobacco Use    Smoking status: Never Smoker    Smokeless tobacco: Never  Used   Substance and Sexual Activity    Alcohol use: No     Alcohol/week: 0.0 oz    Drug use: No    Sexual activity: Yes     Partners: Male     Birth control/protection: None     Comment:  19 years since 1999       PTA Medications   Medication Sig    arm brace (NEOPRENE WRIST SPLINT SUPPORT) Misc 1 Units by Misc.(Non-Drug; Combo Route) route every evening.    cholecalciferol, vitamin D3, 2,000 unit Cap Take 1 capsule by mouth once daily.    conjugated estrogens (PREMARIN) vaginal cream Place 0.5 g vaginally twice a week.    cyclobenzaprine (FLEXERIL) 10 MG tablet TAKE 1 TABLET (10 MG TOTAL) BY MOUTH NIGHTLY AS NEEDED FOR MUSCLE SPASMS.    diphenoxylate-atropine 2.5-0.025 mg (LOMOTIL) 2.5-0.025 mg per tablet Take 1 after each bowel movement. Up to 8 per day.    DULoxetine (CYMBALTA) 60 MG capsule TAKE 1 CAPSULE (60 MG TOTAL) BY MOUTH ONCE DAILY.    estradiol (ESTRACE) 1 MG tablet Take 1 mg by mouth once daily.    gabapentin (NEURONTIN) 300 MG capsule TAKE 1 CAPSULE (300 MG TOTAL) BY MOUTH 2 (TWO) TIMES DAILY.    lisinopril 10 MG tablet TAKE 1 TABLET (10 MG TOTAL) BY MOUTH ONCE DAILY.    magnesium oxide (MAG-OX) 400 mg (241.3 mg magnesium) tablet Take 1 tablet (400 mg total) by mouth once daily.    meclizine (ANTIVERT) 25 mg tablet Take 1 tablet (25 mg total) by mouth 3 (three) times daily as needed for Dizziness.    omeprazole (PRILOSEC) 40 MG capsule Take 1 capsule (40 mg total) by mouth once daily.    oxyCODONE-acetaminophen (PERCOCET) 5-325 mg per tablet Take 1 tablet by mouth every 4 (four) hours as needed.    traMADol (ULTRAM) 50 mg tablet Take 50 mg by mouth every 6 (six) hours as needed for Pain.    traZODone (DESYREL) 50 MG tablet Take 1 tablet (50 mg total) by mouth every evening.       Review of patient's allergies indicates:   Allergen Reactions    Bee sting [allergen ext-venom-honey bee]      Rash      Grass pollen-bermuda, standard      rash       Review of Systems    Constitutional: Negative for chills and fever.   Eyes: Negative for visual disturbance.   Respiratory: Negative for shortness of breath.    Cardiovascular: Positive for chest pain (chronic).   Gastrointestinal: Positive for abdominal pain. Negative for nausea and vomiting.   Genitourinary: Negative for vaginal bleeding and vaginal discharge.   Neurological: Negative for headaches.        Slurred speech, hypertonic upper limbs      Objective:     Vital Signs (Most Recent):  Temp: 97.8 °F (36.6 °C) (12/03/18 0443)  Pulse: 83 (12/03/18 0443)  Resp: 16 (12/03/18 0443)  BP: (!) 141/84 (12/03/18 0443)  SpO2: 98 % (12/03/18 0443) Vital Signs (24h Range):  Temp:  [97.8 °F (36.6 °C)-98.1 °F (36.7 °C)] 97.8 °F (36.6 °C)  Pulse:  [] 83  Resp:  [16-18] 16  SpO2:  [98 %-100 %] 98 %  BP: (126-141)/(80-94) 141/84     Weight: 100.5 kg (221 lb 9 oz)  Body mass index is 33.69 kg/m².    Physical Exam:   Constitutional: She is oriented to person, place, and time. She appears well-developed and well-nourished. No distress.    HENT:   Head: Normocephalic and atraumatic.       Pulmonary/Chest: Effort normal. No respiratory distress.        Abdominal: Soft. She exhibits no distension. There is no tenderness. There is no rebound.             Musculoskeletal: Moves all extremeties.       Neurological: She is alert and oriented to person, place, and time.   Negative chvostek sign, continued hypertonia throughout, continued difficulty with speaking but appears to be improving    Skin: Skin is warm and dry. She is not diaphoretic.    Psychiatric: She has a normal mood and affect. Her behavior is normal. Judgment and thought content normal.       Laboratory:  CBC:   Recent Labs   Lab 12/02/18 1926   WBC 5.75   HGB 11.5*   HCT 35.0*   *    and CMP:   Recent Labs   Lab 12/02/18 1926      K 3.2*   CL 96   CO2 24   *   BUN 19   CREATININE 1.2   CALCIUM 7.0*   PROT 6.9   ALBUMIN 3.4*   BILITOT 0.6   ALKPHOS 120   AST 31    ALT 54*   ANIONGAP 17*   EGFRNONAA 51.0*     Mg: .7    Diagnostic Results:  MRI: No evidence of diffusion restriction suggest acute infarction. No acute intracranial process on this limited examination. Unremarkable MRA of the intracranial circulation.  CT: No convincing acute intracranial abnormalities, noting motion limited exam.  CXR: No definite acute cardiopulmonary finding identified on this hypoventilatory single-view.

## 2018-12-03 NOTE — PLAN OF CARE
Problem: Patient Care Overview  Goal: Plan of Care Review  Outcome: Ongoing (interventions implemented as appropriate)  Pt alert, oriented.  Reported minor pain; refused PRN medication.  No nausea, vomiting, or diarrhea.  IV fluids D5 1/2 NS with 20 mEq K @ 125 ml/hr.  Up with assistance x 1.  Neuro checks every 4 hours.  Cardiac monitoring cont.  RA.  NPO.  Expressive dysphagia; difficulty swallowing reported per patient.  AM labs.  SCDs.  Neurology consult.  Bed alarm on.  Safety precautions in place.  Stable vital signs.  Call light, bedside table in reach.  Will continue to monitor.

## 2018-12-03 NOTE — CONSULTS
[facility] [address]  Vascular Neurology  Comprehensive Stroke Center  Consult Note    Inpatient consult to Vascular (Stroke) Neurology  Consult performed by: Jose Guadalupe Muñoz MD  Consult ordered by: Ilda Hussein MD        Assessment/Plan:     Patient is a 53 y.o. year old female with:    * Seizure    - Pt with sudden onset difficulty talking and jerking of bilateral upper limbs and left facial twitching, Flexed upper limbs bilaterally - Tonic seizures    - MRI brain negative for any acute ischemic changes, no FLAIR changes for potential metastasis, MRI brain w/ contrast can be followed up to rule out metastasis.  - Pt also has stressor as one daughter ammitted suicide and pt also attempted suicide about a week ago.   - rec Vimapt load of 200 mg and start 100 mg BID  - rec admission to IM with consult to Gen Neuro  - rec EEG.              STROKE DOCUMENTATION     Acute Stroke Times   Last Known Normal Date: 12/02/18  Last Known Normal Time: 1700  Symptom Onset Date: 12/02/18  Stroke Team Called Date: 12/02/18  Stroke Team Called Time: 1900  Stroke Team Arrival Date: 12/02/18  Stroke Team Arrival Time: 1900  CT Interpretation Time: 1910    NIH Scale:  Interval: baseline (upon arrival/admit)  1a. Level Of Consciousness: 0-->Alert: keenly responsive  1b. LOC Questions: 0-->Answers both questions correctly  1c. LOC Commands: 0-->Performs both tasks correctly  2. Best Gaze: 0-->Normal  3. Visual: 0-->No visual loss  4. Facial Palsy: 1-->Minor paralysis (flattened nasolabial fold, asymmetry on smiling)  5a. Motor Arm, Left: 0-->No drift: limb holds 90 (or 45) degrees for full 10 secs  5b. Motor Arm, Right: 0-->No drift: limb holds 90 (or 45) degrees for full 10 secs  6a. Motor Leg, Left: 0-->No drift: leg holds 30 degree position for full 5 secs  6b. Motor Leg, Right: 0-->No drift: leg holds 30 degree position for full 5 secs  7. Limb Ataxia: 0-->Absent  8. Sensory: 0-->Normal: no sensory  loss  9. Best Language: 1-->Mild-to-moderate aphasia: some obvious loss of fluency or facility of comprehension, without significant limitation on ideas expressed or form of expression. Reduction of speech and/or comprehension, however, makes conversation. . . (see row details)  10. Dysarthria: 1-->Mild-to-moderate dysarthria: patient slurs at least some words and, at worst, can be understood with some difficulty  11. Extinction and Inattention (formerly Neglect): 0-->No abnormality  Total (NIH Stroke Scale): 3    Modified [place] Score: 0  Ivan Coma Scale:12   ABCD2 Score:    [id number] Score:   HAS -BLED Score:   ICH Score:   Hunt & López Classification:       Thrombolysis Candidate? No, Out of window , Strong suspicion for stroke mimic or alternative diagnosis       Interventional Revascularization Candidate?   Is the patient eligible for mechanical endovascular reperfusion (YONATAN)?  Stroke Mimic      Hemorrhagic change of an Ischemic Stroke: Does this patient have an ischemic stroke with hemorrhagic changes? No     Subjective:     History of Present Illness:  Ms Riley is a 52 y/o AAF with PMHx of HTN, Metastatic SCC, cervical cancer, hemifacial spasm, Fibromyalgia, depression  was brought to the ED per  for sudden onset change in speech.     Hx provided by  at bedside. Per  pt was relatively all right at 5 pm. She went to bed and woke up at 6 pm calling out his name. She was found to have flexed arm intermittently jerking for 15 mins with twitching and drooling from the left side. Not able to talk. He said it stopped on reaching the ED. While in the ED pt was able to talk short sentences and followed all commands. She was not weak on either side of her body. Twitching on left side of the face was noticed with bilateral wrist flexed. CTH was unremarkable.           Past Medical History:   Diagnosis Date    Anxiety     Cervical cancer 2014    Chronic back pain     Depression      Diarrhea due to malabsorption 11/14/2018    Fibromyalgia     GERD (gastroesophageal reflux disease)     Hiatal hernia 2014    History of cervical cancer 10/11/2018    Hx of psychiatric care     Cymbalta, trazodone    Hypertension     Hypomagnesemia 11/21/2018    Lactose intolerance     Metastatic squamous cell carcinoma to lymph node 10/11/2018    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 drug 11/14/2018    Osteoarthritis of back     Psychiatric problem     Stroke 1972     Past Surgical History:   Procedure Laterality Date    BILATERAL OOPHORECTOMY  2015    CHOLECYSTECTOMY  11/09/2016    Done at [facility], path showed chronic cholecystitis and gallstones    CHOLECYSTECTOMY-LAPAROSCOPIC N/A 11/9/2016    [facility] by [facility] 2ND FLR    cold knife conization  2014    COLONOSCOPY  2014    COLONOSCOPY N/A 10/24/2016    at [facility], Dr Thomas    COLONOSCOPY N/A 5/18/2018    Procedure: COLONOSCOPY;  Surgeon: Arden Gutiérrez MD;  Location: [facility] (4TH FLR);  Service: Endoscopy;  Laterality: N/A;    COLONOSCOPY N/A 5/18/2018    Performed by Arden Gutiérrez MD at [facility] (4TH FLR)    COLONOSCOPY N/A 10/24/2016    Performed by Arden Gutiérrez MD at [facility] (4TH FLR)    ESOPHAGOGASTRODUODENOSCOPY  2014    ESOPHAGOGASTRODUODENOSCOPY (EGD) N/A 10/24/2016    Performed by Arden [facility]utiérrez MD at [facility] (4TH FLR)    HYSTERECTOMY  2014    [facility] for [facility] cancer    LYMPHADENECTOMY, RETROPERITONEUM Right 9/17/2018    [facility]formed by Sebas Reed [facility] at [facility] 2ND FLR    OOPHORECTOMY      RETROPERITONEAL LYMPHADENECTOMY Right 9/17/2018    Procedure: LYMPHADENECTOMY, RETROPERITONEUM;  Surgeon: Sebas Reed MD;  Location: [facility] OR [address] [place] [address];  Service: General;  Laterality: Right;  ILIAC     Family History   Problem Relation Age of Onset    Heart disease Sister     Heart disease Maternal Grandmother     Colon cancer Father     Esophageal cancer Father     Cancer Father          Lung-smoker    Cancer Mother         Cervical    Cervical cancer Mother     Breast cancer Maternal Aunt     Suicide Daughter         jumped from parking structure    Drug abuse Daughter     Drug abuse Daughter     Rectal cancer Neg Hx     Stomach cancer Neg Hx     Crohn's disease Neg Hx     Ulcerative colitis Neg Hx     Diabetes Neg Hx     Hypertension Neg Hx      Social History     Tobacco Use    Smoking status: Never Smoker    Smokeless tobacco: Never Used   Substance Use Topics    Alcohol use: No     Alcohol/week: 0.0 oz    Drug use: No     Review of patient's allergies indicates:   Allergen Reactions    Bee sting [allergen ext-venom-honey bee]      Rash      Grass pollen-bermuda, standard      rash       Medications: I have reviewed the current medication administration record.      (Not in a hospital admission)    Review of Systems   Constitutional: Positive for diaphoresis and fatigue. Negative for appetite change, chills and fever.   HENT: Negative for ear discharge and ear pain.    Eyes: Negative for pain and itching.   Respiratory: Negative for cough and choking.    Gastrointestinal: Negative for abdominal pain.   Genitourinary: Negative for flank pain.   Neurological: Positive for seizures and facial asymmetry.     Objective:     Vital Signs (Most Recent):  Temp: 97.8 °F (36.6 °C) (12/02/18 1845)  Pulse: (!) 116 (12/02/18 1845)  Resp: 18 (12/02/18 1845)  BP: (!) 140/84 (12/02/18 1845)  SpO2: 100 % (12/02/18 1845)    Vital Signs Range (Last 24H):  Temp:  [97.8 °F (36.6 °C)]   Pulse:  [116]   Resp:  [18]   BP: (140)/(84)   SpO2:  [100 %]     Physical Exam    Neurological Exam:     MSE- Alert and awake, Orientation x 3 with intact comprehension of speech, follows complex commands, limited vocabulary, naming intact   CN - Pupils reactive to light, VF intact, EOMI, bilateral facial symmetry and sensation intact   Strength - at least 4 / 5 all 4 ext   Sensory - intact LT all 4 limbs   Co  - ordination - intact FTN and HTS       Laboratory:  CMP: No results for input(s): GLUCOSE, CALCIUM, ALBUMIN, PROT, NA, K, CO2, CL, BUN, CREATININE, ALKPHOS, ALT, AST, BILITOT in the last 24 hours.  CBC:   Recent Labs   Lab 11/26/18  1101   WBC 4.09   RBC 4.27   HGB 11.4*   HCT 37.0   *   MCV 87   MCH 26.7*   MCHC 30.8*     Lipid Panel: No results for input(s): CHOL, LDLCALC, HDL, TRIG in the last 168 hours.  Coagulation: No results for input(s): PT, INR, APTT in the last 168 hours.  Hgb A1C: No results for input(s): HGBA1C in the last 168 hours.  TSH: No results for input(s): TSH in the last 168 hours.    Diagnostic Results:      Brain imaging:    MRI brain - unremarkable, no ischemic changes     Vessel Imaging:    MRA brain - neg for any acute occlusion     Cardiac Evaluation:     None       Jose Guadalupe Muñoz MD  [facility] Stroke [facility]  Department of Vascular Neurology   [facility]

## 2018-12-03 NOTE — HPI
53 y.o. Female with HTN, facial palsy with hemifacial spasm, fibromyalgia, depression, hx of cervical cancer s/p hysterectomy (2015) and metastatic SCC to lymph nodes s/p chemoradiation (4 cycles of cisplatin, last dose 11/28) presented to ED 12/2/18 for seizure activity at home. Per chart review, patient was in normal state of health at 5 pm then  went to wake her from nap around 6 pm and she had difficulty speaking, was clenching her hands and extremities were rigid. She had intermittent jerking of her arms for ~15 minutes. No reports of tongue biting, urinary or bowel incontinence. Of note, she did receive chemo last week and has been vomiting with decreased PO intake. Denies medication changes. Denies hx of seizures, head trauma and CNS infections. Reports chronic left facial droop and was previously seen by Dr. Benjamin (07/2017) for left LMN CN VII palsy (since age 7) and hemifacial spasms. In ED, she was slurring her speech and stroke code was called. Vascular Neurology evaluated patient and MRI/MRA brain negative for acute intracranial pathology. Labs were remarkable for hypomagnesemia, hypocalcemia and hypokalemia. She was loaded with 200 mg of Vimpat and started on 100 mg BID.

## 2018-12-03 NOTE — ASSESSMENT & PLAN NOTE
--underwent stroke eval per [facility] neuro: low suspicion for stroke  --episode may be related to seizure: general neurology consult placed and paged  ----will start lacosamide empirically per [facility] neuro recs  ----pt may need EEG. Will defer to gen neuro.  --electrolyte abnormalities noted on admission: will replace Ca2+, Mg, K and recheck in am  --holding all PO meds until neuro eval. May need speech eval prior to resuming PO.

## 2018-12-03 NOTE — ASSESSMENT & PLAN NOTE
- Pt with sudden onset difficulty talking and jerking of bilateral upper limbs and left facial twitching, Flexed upper limbs bilaterally - Tonic seizures    - MRI brain negative for any acute ischemic changes, no FLAIR changes for potential metastasis, MRI brain w/ contrast can be followed up to rule out metastasis.  - Pt also has stressor as one daughter ammitted suicide and pt also attempted suicide about a week ago.   - rec Vimapt load of 200 mg and start 100 mg BID  - rec admission to IM with consult to Gen Neuro  - rec EEG.

## 2018-12-03 NOTE — HPI
Ms Riley is a 52 y/o AAF with PMHx of HTN, Metastatic SCC, cervical cancer, hemifacial spasm, Fibromyalgia, depression  was brought to the ED per  for sudden onset change in speech.     Hx provided by  at bedside. Per  pt was relatively all right at 5 pm. She went to bed and woke up at 6 pm calling out his name. She was found to have flexed arm intermittently jerking for 15 mins with twitching and drooling from the left side. Not able to talk. He said it stopped on reaching the ED. While in the ED pt was able to talk short sentences and followed all commands. She was not weak on either side of her body. Twitching on left side of the face was noticed with bilateral wrist flexed. CTH was unremarkable.

## 2018-12-03 NOTE — ASSESSMENT & PLAN NOTE
Hx of cervical CA (2015) and now on chemoradiation (radiation M-F) and cisplatin weekly, last given 11/28  --management per GYN ONC

## 2018-12-03 NOTE — ASSESSMENT & PLAN NOTE
--Mg .7, Ca2+ 7.0 (corrected - 7.5), K 3.2  --replacement ordered by ED physician  --am labs pending. 2g additional MgSO4 ordered for this am.   --continue repletion PRN

## 2018-12-03 NOTE — ASSESSMENT & PLAN NOTE
--h/o suicide attempt, recent  encounter for selfharm behavior  --death of daughter by suicide earlier this year  --no suicidal ideation at this time

## 2018-12-03 NOTE — HPI
Edita Riley, a 53 y.o. female, for therapy visit.  Met with patient.    Chief Complaint/Reason for Encounter: depression, adaptation to disease and treatment

## 2018-12-03 NOTE — ED NOTES
Patient identifiers for [facility] 53 y.o. female checked and correct.  Chief Complaint   Patient presents with    Aphasia     pt ws lying in bed, called for family member, and when he  got there pt couldn't get her words out and hands were clenched shut. on chemo and radiation. hx of stroke. SOB as well. LkN: today at 6pm     Past Medical History:   Diagnosis Date    Anxiety     Cervical cancer 2014    Chronic back pain     Depression     Diarrhea due to malabsorption 11/14/2018    Fibromyalgia     GERD (gastroesophageal reflux disease)     Hiatal hernia 2014    History of cervical cancer 10/11/2018    Hx of psychiatric care     Cymbalta, trazodone    Hypertension     Hypomagnesemia 11/21/2018    Lactose intolerance     Metastatic squamous cell carcinoma to lymph node 10/11/2018    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 drug 11/14/2018    Osteoarthritis of back     Psychiatric problem     Stroke 1972     Allergies reported:   Review of patient's allergies indicates:   Allergen Reactions    Bee sting [allergen ext-venom-honey bee]      Rash      Grass pollen-bermuda, standard      rash         LOC: Patient is awake, alert, and aware of environment with an appropriate affect. Patient is oriented x 3, slurred speech.  APPEARANCE: Patient is tearful with complaint of pain to bilateral hands. Patient is clean and well groomed, patient's clothing is properly fastened.  SKIN: The skin is warm and diaphoretic. Patient has normal skin turgor and moist mucus membranes. Skin is intact; no bruising or breakdown noted.  MUSKULOSKELETAL: Patient has limited ROM to bilateral hands, hand cramping noted to bilateral hands, Pulses intact.   RESPIRATORY: Airway is open and patent. Respirations are spontaneous and non-labored with normal effort and rate, BBS=clear  CARDIAC: Patient has a normal rate and rhythm. SR on cardiac monitor,No peripheral edema noted.   ABDOMEN: No distention noted. Bowel sounds  active in all 4 quadrants. Soft and non-tender upon palpation.  NEUROLOGICAL: pupils PERRL. Left facial droop noted. No drift noted x 4 extremities. Normal sensation in all extremities when touched with finger.

## 2018-12-03 NOTE — H&P
[facility]  Gynecologic Oncology  H&P    Patient Name: Edita Riley  MRN: [id number]  Admission Date: 12/2/2018  Primary Care Provider: Hammad Lackey MD   Principal Problem: Seizure-like activity    Subjective:     Chief Complaint/Reason for Admission: seizure like activity    History of Present Illness:  Pt is a 53 year old female with metastatic cervical cancer who presented to the ED with aphasia and hypertonic extremities. Per patient history, she was sitting at home and had sudden onset fist clenching/muscle spasms and was unable to speak. Says the symptoms are starting to improve but still present - speech continues to be slurred. In ED, stroke code was called - imaging showed no evidence of stroke. Labs did show low magnesium, calcium, and potassium, however. Gyn Onc was consulted for further management. Pt reports that nothing like this has happened to her before. Is on chemotherapy and radiation for her recurrent cancer. (She is s/p surgery in 2015.) S/p 4 cycles of cisplatin (last 11/28.) Patient has experienced vomiting for the past few days. Has not been able to eat/drink much. Mild abdominal pain. No vaginal bleeding. Should be noted that patient was referred to social work last week for concerns of self-harm behavior.     Hospital Course:  12/02/2018 Admitted to gyn onc service for seizure-like activity. Vas neuro consult ruled out stroke. Gen neuro consult placed. Started on lacosamide per vas neuro recs. Correcting electrolyte abnormalities.     Oncology Treatment Plan:   OP GYN CISPLATIN (Weekly)    Oncology History:   Her oncologic history is:  Referring physician:  Dr. Sebas Reed  Reason for referral:  Incisional biopsy of retroperitoneal periaortic node that shows squamous cell carcinoma consistent with gynecologic primary     Aug 2018: chronic abdominal pain referred for consideration of biopsy of a 2 cm right common iliac artery bifurcation node  Node was  evident on scan in 4/18 and again in 7/18 - no change  Patient's symptoms are non specific, diffuse, have not resulted in weight loss  Patient already carries a diagnosis of diffuse pain syndromes including fibromyalgia     Aug 2018: PET scan Right common iliac lymph node suspicious for malignancy.  This could be benign or malignant lymphoproliferative disorder metastatic disease.  This is an a risky position for percutaneous biopsy.      Sept 17, 2018: Underwent retroperitoneal approach for incisional biopsy of right common iliac LN. Pathology showed:  Supplemental Diagnosis  METASTATIC HIGH-GRADE CARCINOMA GROWING WITH SQUAMOUS CELL FEATURES. THE RESULTS OF  ADDITIONAL IMMUNOSTAINS ARE AS FOLLOWS: CK5/6, P63 AND CK7 ARE POSITIVE. TTF, S100 AND  CK20 ARE ALL NEGATIVE. THE CONTROLS STAIN APPROPRIATELY.  NOTE: GIVEN THESE RESULTS THIS MAY REPRESENT A PRIMARY IN THE URINARY OR LOWER GYN  TRACTS. DR. JAMESON HAS ALSO REVIEWED THIS CASE AND CONCURS WITH THE DIAGNOSIS.     Pap:  June 6, 2018:  Normal     She has a history of hysterectomy for cervical cancer in 2015 in [place], [place]. Hyst was for abnormal pap. Incidental finding of cervical cancer on the hyst specimen. She does not know any other details.         Past Medical History:   Diagnosis Date    Anxiety     Cervical cancer 2014    Chronic back pain     Depression     Diarrhea due to malabsorption 11/14/2018    Fibromyalgia     GERD (gastroesophageal reflux disease)     Hiatal hernia 2014    History of cervical cancer 10/11/2018    Hx of psychiatric care     Cymbalta, trazodone    Hypertension     Hypomagnesemia 11/21/2018    Lactose intolerance     Metastatic squamous cell carcinoma to lymph node 10/11/2018    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 drug 11/14/2018    Osteoarthritis of back     Psychiatric problem     Stroke 1972     Past Surgical History:   Procedure Laterality Date    BILATERAL OOPHORECTOMY  2015    CHOLECYSTECTOMY  11/09/2016     Done at [facility], path showed chronic cholecystitis and gallstones    CHOLECYSTECTOMY-LAPAROSCOPIC N/A 11/9/2016    [facility] 2ND FLR    cold knife conization  2014    COLONOSCOPY  2014    COLONOSCOPY N/A 10/24/2016    at [facility], Dr Thomas    COLONOSCOPY N/A 5/18/2018    Procedure: COLONOSCOPY;  Surgeon: Arden Gutiérrez MD;  Location: [facility] (4TH FLR);  Service: Endoscopy;  Laterality: N/A;    COLONOSCOPY N/A 5/18/2018    [facility] at [facility] (4TH FLR)    COLONOSCOPY N/A 10/24/2016    [facility] at [facility] (4TH FLR)    ESOPHAGOGASTRODUODENOSCOPY  2014    ESOPHAGOGASTRODUODENOSCOPY (EGD) N/A 10/24/2016    Per[facility] by Ar[facility] at [facility] (4TH FLR)    HYSTERECTOMY  2014    [facility] for cervical cancer    LYMPHADENECTOMY, RETROPERITONEUM Right 9/17/2018    [facility] 2ND FLR    OOPHORECTOMY      RETROPERITONEAL LYMPHADENECTOMY Right 9/17/2018    Procedure: LYMPHADENECTOMY, RETROPERITONEUM;  Surgeon: Sebas Reed MD;  Location: [facility] OR [address] [place] [address];  Service: General;  Laterality: Right;  ILIAC     Family History     Problem Relation (Age of Onset)    Breast cancer Maternal Aunt    Cancer Father, Mother    Cervical cancer Mother    Colon cancer Father    Drug abuse Daughter, Daughter    Esophageal cancer Father    Heart disease Sister, Maternal Grandmother    Suicide Daughter        Tobacco Use    Smoking status: Never Smoker    Smokeless tobacco: Never Used   Substance and Sexual Activity    Alcohol use: No     Alcohol/week: 0.0 oz    Drug use: No    Sexual activity: Yes     Partners: Male     Birth control/protection: None     Comment:  19 years since 1999         (Not in a hospital admission)    Review of patient's allergies indicates:   Allergen Reactions    Bee sting [allergen ext-venom-honey bee]      Rash      Grass pollen-bermuda, standard      rash        Review of Systems   Constitutional: Negative for chills and fever.   Eyes: Negative for visual disturbance.   Respiratory: Negative for shortness of breath.    Cardiovascular: Positive for chest pain (chronic).   Gastrointestinal: Positive for abdominal pain, nausea and vomiting.   Genitourinary: Negative for vaginal bleeding and vaginal discharge.   Neurological: Negative for headaches.        Slurred speech, hypertonic upper limbs      Objective:     Vital Signs (Most Recent):  Temp: 97.8 °F (36.6 °C) (12/02/18 1845)  Pulse: 89 (12/02/18 2031)  Resp: 18 (12/02/18 2031)  BP: 137/89 (12/02/18 2031)  SpO2: 100 % (12/02/18 2031) Vital Signs (24h Range):  Temp:  [97.8 °F (36.6 °C)] 97.8 °F (36.6 °C)  Pulse:  [] 89  Resp:  [18] 18  SpO2:  [100 %] 100 %  BP: (137-140)/(84-89) 137/89     Weight: 103 kg (227 lb)  Body mass index is 34.52 kg/m².    Physical Exam:   Constitutional: She is oriented to person, place, and time. She appears well-developed and well-nourished. No distress.    HENT:   Head: Normocephalic and atraumatic.       Pulmonary/Chest: Effort normal. No respiratory distress.        Abdominal: Soft. She exhibits no distension. There is no tenderness. There is no rebound.             Musculoskeletal: Moves all extremeties.       Neurological: She is alert and oriented to person, place, and time.   Does not appear post-ictal, + assymetric smile, facial movements otherwise symmetric, EOMI, sensation in tact throughout, strength equal in bilateral upper limbs, negative Romberg    Skin: Skin is warm and dry. She is not diaphoretic.    Psychiatric: She has a normal mood and affect. Her behavior is normal. Judgment and thought content normal.       Laboratory:  CBC:   Recent Labs   Lab 12/02/18 1926   WBC 5.75   HGB 11.5*   HCT 35.0*   *    and CMP:   Recent Labs   Lab 12/02/18 1926      K 3.2*   CL 96   CO2 24   *   BUN 19   CREATININE 1.2   CALCIUM 7.0*   PROT 6.9   ALBUMIN 3.4*    BILITOT 0.6   ALKPHOS 120   AST 31   ALT 54*   ANIONGAP 17*   EGFRNONAA 51.0*     Mg: .7    Diagnostic Results:  MRI: No evidence of diffusion restriction suggest acute infarction. No acute intracranial process on this limited examination. Unremarkable MRA of the intracranial circulation.  CT: No convincing acute intracranial abnormalities, noting motion limited exam.  CXR: No definite acute cardiopulmonary finding identified on this hypoventilatory single-view.    Assessment/Plan:     * Seizure-like activity    --underwent stroke eval per [facility] neuro: low suspicion for stroke  --episode may be related to seizure: general neurology consult placed and paged  ----will start lacosamide empirically per [facility] neuro recs  ----pt may need EEG. Will defer to gen neuro.  --electrolyte abnormalities noted on admission: will replace Ca2+, Mg, K and recheck in am  --holding all PO meds until neuro eval. May need speech eval prior to resuming PO.     Electrolyte disorder    --Mg .7, Ca2+ 7.0 (corrected - 7.5), K 3.2  --replacement ordered by ED physician  --recheck labs in am, replace PRN     Metastatic squamous cell carcinoma to lymph node    --h/o cervical cancer s/p surgery in 2015  --now on cisplatin (s/p 4 cycles, last 11/28) and radiation (M-F)  --no evidence of brain mets on imaging, consider MRI w contrast to confirm     Gastroesophageal reflux disease with esophagitis    --IV protonix. Switch to PO once tolerating.     Depression    --h/o suicide attempt, recent sw encounter for selfharm behavior  --death of daughter by suicide earlier this year  --no suicidal ideation at this time     Essential hypertension    --BP: (137-140)/(84-89) 137/89   --holding home lisinopril until able to safely take PO meds         Discussed case with Dr. Lowery who agrees with plan. General neurology paged. Awaiting call back.     Pelon Burden MD  Gynecologic Oncology  [facility]

## 2018-12-03 NOTE — CONSULTS
[facility]  Neurology  Consult Note    Patient Name: Edita Riley  MRN: [id number]  Admission Date: 12/2/2018  Hospital Length of Stay: 1 days  Code Status: Full Code   Attending Provider: Refugio Lemon MD   Consulting Provider: Kandice Christianson PA-C  Primary Care Physician: Hammad Lackey MD  Principal Problem:Seizure-like activity    Inpatient consult to Neurology  Consult performed by: Kandice Christianson PA-C  Consult ordered by: Pelon Burden MD         Subjective:     Chief Complaint:  seizure     HPI:   53 y.o. Female with HTN, facial palsy with hemifacial spasm, fibromyalgia, depression, hx of cervical cancer s/p hysterectomy (2015) and metastatic SCC to lymph nodes s/p chemoradiation (4 cycles of cisplatin, last dose 11/28) presented to ED 12/2/18 for seizure activity at home. Per chart review, patient was in normal state of health at 5 pm then  went to wake her from nap around 6 pm and she had difficulty speaking, was clenching her hands and extremities were rigid. She had intermittent jerking of her arms for ~15 minutes. No reports of tongue biting, urinary or bowel incontinence. Of note, she did receive chemo last week and has been vomiting with decreased PO intake. Denies medication changes. Denies hx of seizures, head trauma and CNS infections. Reports chronic left facial droop and was previously seen by Dr. Benjamin (07/2017) for left LMN CN VII palsy (since age 7) and hemifacial spasms. In ED, she was slurring her speech and stroke code was called. Vascular Neurology evaluated patient and MRI/MRA brain negative for acute intracranial pathology. Labs were remarkable for hypomagnesemia, hypocalcemia and hypokalemia. She was loaded with 200 mg of Vimpat and started on 100 mg BID.      Past Medical History:   Diagnosis Date    Anxiety     Cervical cancer 2014    Chronic back pain     Depression     Diarrhea due to malabsorption 11/14/2018    Fibromyalgia      GERD (gastroesophageal reflux disease)     Hiatal hernia 2014    History of cervical cancer 10/11/2018    Hx of psychiatric care     Cymbalta, trazodone    Hypertension     Hypomagnesemia 11/21/2018    Lactose intolerance     Metastatic squamous cell carcinoma to lymph node 10/11/2018    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 drug 11/14/2018    Osteoarthritis of back     Psychiatric problem     Stroke 1972     Past Surgical History:   Procedure Laterality Date    BILATERAL OOPHORECTOMY  2015    CHOLECYSTECTOMY  11/09/2016    Done at [facility], path showed chronic cholecystitis and gallstones    CHOLECYSTECTOMY-LAPAROSCOPIC N/A 11/9/2016    [facility] 2[facility]    cold knife conization  2014    COLONOSCOPY  2014    COLONOSCOPY N/A 10/24/2016    at [facility], Dr Thomas    COLONOSCOPY N/A 5/18/2018    Procedure: COLONOSCOPY;  Surgeon: Arden Gutiérrez MD;  Location: [facility] ([address] [place] [address]);  Service: Endoscopy;  Laterality: N/A;    COLONOSCOPY N/A 5/18/2018    [facility] by [facility] at [facility] (4TH FLR)    COLONOSCOPY N/A 10/24/2016    Per[facility] by [facility] at [facility] (4TH FLR)    ESOPHAGOGASTRODUODENOSCOPY  2014    ESOPHAGOGASTRODUODENOSCOPY (EGD) N/A 10/24/2016    Performed by Arden Gutirérez MD at [facility] (4TH FLR)    HYSTERECTOMY  2014    [facility] for cervical cancer    LYMPHADENECTOMY, RETROPERITONEUM Right 9/17/2018    Performed by Sebas Reed [facility] at [facility] OR 2ND FLR    OOPHORECTOMY      RETROPERITONEAL LYMPHADENECTOMY Right 9/17/2018    Procedure: LYMPHADENECTOMY, RETROPERITONEUM;  Surgeon: Sebas Reed MD;  Location: [facility] OR [address] [place] [address];  Service: General;  Laterality: Right;  ILIAC     Review of patient's allergies indicates:   Allergen Reactions    Bee sting [allergen ext-venom-honey bee]      Rash      Grass pollen-bermuda, standard      rash     No current facility-administered medications on file prior to encounter.       Current Outpatient Medications on File Prior to Encounter   Medication Sig    arm brace (NEOPRENE WRIST SPLINT SUPPORT) Misc 1 Units by Misc.(Non-Drug; Combo Route) route every evening.    cholecalciferol, vitamin D3, 2,000 unit Cap Take 1 capsule by mouth once daily.    conjugated estrogens (PREMARIN) vaginal cream Place 0.5 g vaginally twice a week.    cyclobenzaprine (FLEXERIL) 10 MG tablet TAKE 1 TABLET (10 MG TOTAL) BY MOUTH NIGHTLY AS NEEDED FOR MUSCLE SPASMS.    diphenoxylate-atropine 2.5-0.025 mg (LOMOTIL) 2.5-0.025 mg per tablet Take 1 after each bowel movement. Up to 8 per day.    DULoxetine (CYMBALTA) 60 MG capsule TAKE 1 CAPSULE (60 MG TOTAL) BY MOUTH ONCE DAILY.    estradiol (ESTRACE) 1 MG tablet Take 1 mg by mouth once daily.    gabapentin (NEURONTIN) 300 MG capsule TAKE 1 CAPSULE (300 MG TOTAL) BY MOUTH 2 (TWO) TIMES DAILY.    lisinopril 10 MG tablet TAKE 1 TABLET (10 MG TOTAL) BY MOUTH ONCE DAILY.    magnesium oxide (MAG-OX) 400 mg (241.3 mg magnesium) tablet Take 1 tablet (400 mg total) by mouth once daily.    meclizine (ANTIVERT) 25 mg tablet Take 1 tablet (25 mg total) by mouth 3 (three) times daily as needed for Dizziness.    omeprazole (PRILOSEC) 40 MG capsule Take 1 capsule (40 mg total) by mouth once daily.    oxyCODONE-acetaminophen (PERCOCET) 5-325 mg per tablet Take 1 tablet by mouth every 4 (four) hours as needed.    traMADol (ULTRAM) 50 mg tablet Take 50 mg by mouth every 6 (six) hours as needed for Pain.    traZODone (DESYREL) 50 MG tablet Take 1 tablet (50 mg total) by mouth every evening.     Family History     Problem Relation (Age of Onset)    Breast cancer Maternal Aunt    Cancer Father, Mother    Cervical cancer Mother    Colon cancer Father    Drug abuse Daughter, Daughter    Esophageal cancer Father    Heart disease Sister, Maternal Grandmother    Suicide Daughter        Tobacco Use    Smoking status: Never Smoker    Smokeless tobacco: Never Used   Substance  and Sexual Activity    Alcohol use: No     Alcohol/week: 0.0 oz    Drug use: No    Sexual activity: Yes     Partners: Male     Birth control/protection: None     Comment:  19 years since 1999     Review of Systems   Constitutional: Positive for fatigue. Negative for fever.   HENT: Negative for trouble swallowing.    Eyes: Negative for visual disturbance.   Musculoskeletal: Negative for neck stiffness.   Allergic/Immunologic: Positive for immunocompromised state.   Neurological: Positive for facial asymmetry. Negative for dizziness, tremors, weakness, light-headedness and headaches.     Objective:     Vital Signs (Most Recent):  Temp: 97.6 °F (36.4 °C) (12/03/18 1121)  Pulse: 85 (12/03/18 1129)  Resp: 18 (12/03/18 1121)  BP: 134/74 (12/03/18 1121)  SpO2: 99 % (12/03/18 1121) Vital Signs (24h Range):  Temp:  [97.6 °F (36.4 °C)-98.2 °F (36.8 °C)] 97.6 °F (36.4 °C)  Pulse:  [] 85  Resp:  [16-19] 18  SpO2:  [97 %-100 %] 99 %  BP: (126-141)/(74-94) 134/74     Weight: 100.5 kg (221 lb 9 oz)  Body mass index is 33.69 kg/m².    Physical Exam   Eyes: EOM are normal. Pupils are equal, round, and reactive to light.   Neurological: She has a normal Finger-Nose-Finger Test.   Reflex Scores:       Bicep reflexes are 2+ on the right side and 2+ on the left side.       Brachioradialis reflexes are 2+ on the right side and 2+ on the left side.       Patellar reflexes are 3+ on the right side and 3+ on the left side.      NEUROLOGICAL EXAMINATION:     MENTAL STATUS   Oriented to person.   Oriented to place. Oriented to city.   Oriented to year, month and day.   Follows 2 step commands.   Attention: normal. Concentration: normal.   Level of consciousness: alert  Knowledge: good.   Normal comprehension.     CRANIAL NERVES     CN III, IV, VI   Pupils are equal, round, and reactive to light.  Extraocular motions are normal.   Nystagmus: none   Diplopia: none    CN VIII   Hearing: intact    CN IX, X   Palate:  symmetric    CN XII   Tongue atrophy: no bite marks.       Left hemifacial spasm     MOTOR EXAM   Muscle bulk: normal  Overall muscle tone: normal  Right arm pronator drift: absent  Left arm pronator drift: absent       Moving all extremities antigravity      REFLEXES     Reflexes   Right brachioradialis: 2+  Left brachioradialis: 2+  Right biceps: 2+  Left biceps: 2+  Right patellar: 3+  Left patellar: 3+  Right plantar: normal  Left plantar: normal    SENSORY EXAM   Light touch normal.   Right leg vibration: decreased from ankle  Left leg vibration: decreased from ankle    GAIT AND COORDINATION     Gait  Gait: (deferred)     Coordination   Finger to nose coordination: normal    Tremor   Resting tremor: absent    Significant Labs:   CBC:   Recent Labs   Lab 12/02/18 1926 12/02/18  1934 12/03/18  0614   WBC 5.75  --  4.81   HGB 11.5*  --  11.0*   HCT 35.0* 35* 32.7*   *  --  123*     CMP:   Recent Labs   Lab 12/02/18 1926 12/03/18  0613   * 128*    138   K 3.2* 3.3*   CL 96 99   CO2 24 28   BUN 19 15   CREATININE 1.2 1.0   CALCIUM 7.0* 7.0*   MG 0.7* 0.9*   PROT 6.9 6.5   ALBUMIN 3.4* 3.1*   BILITOT 0.6 0.6   ALKPHOS 120 112   AST 31 28   ALT 54* 51*   ANIONGAP 17* 11   EGFRNONAA 51.0* >60.0     Inflammatory Markers: No results for input(s): SEDRATE, CRP, PROCAL in the last 48 hours.  Urine Culture: No results for input(s): LABURIN in the last 48 hours.  Urine Studies: No results for input(s): COLORU, APPEARANCEUA, PHUR, SPECGRAV, PROTEINUA, GLUCUA, KETONESU, BILIRUBINUA, OCCULTUA, NITRITE, UROBILINOGEN, LEUKOCYTESUR, RBCUA, WBCUA, BACTERIA, SQUAMEPITHEL, HYALINECASTS in the last 48 hours.    Invalid input(s): WRIGHTSUR  All pertinent lab results from the past 24 hours have been reviewed.    Significant Imaging: I have reviewed and interpreted all pertinent imaging results/findings within the past 24 hours.     CT Head WO contrast (12/2/18):  There is no evidence of acute major vascular  territory infarct, hemorrhage, or mass.  There is no hydrocephalus.  There are no abnormal extra-axial fluid collections.  The paranasal sinuses and mastoid air cells are clear, and there is no evidence of calvarial fracture.  The visualized soft tissues are unremarkable.    MRI/MRA Brain WO contrast (12/2/18):  No evidence of diffusion restriction suggest acute infarction. No acute intracranial process on this limited examination.  Unremarkable MRA of the intracranial circulation.    Assessment and Plan:     * Seizure-like activity    Patient found to have difficulty speaking with rigid extremities, clenched hands and UE convulsions upon awakening from nap yesterday at 6 pm. No previous hx of seizures, head trauma or CNS infections.   Of note, she has been sleep deprived with decreased PO intake over past few days after recent chemo 11/28. Stroke code called in ED and MRI/MRA unremarkable for acute intracranial pathology. She was empirically started on Vimpat 100 mg BID after 200 mg load.     >>Suspect provoked seizure in setting of numerous metabolic derangements, but will complete new onset seizure workup with repeat imaging and EEG    Recommendations:  --repeat MRI brain W WO contrast (epilepsy protocol)   --routine EEG  --serum paraneoplastic panel   --pending results of above, will decide whether continuation of Vimpat is necessary    Electrolyte disorder    Hypomagnesemia, hypokalemia and hypocalcemia on admit  Provoking factors for seizure  --management per primary team   Metastatic squamous cell carcinoma to lymph node    Hx of cervical CA (2015) and now on chemoradiation (radiation M-F) and cisplatin weekly, last given 11/28  --management per GYN ONC     VTE Risk Mitigation (From admission, onward)        Ordered     Place sequential compression device  Until discontinued      12/02/18 2111     IP VTE HIGH RISK PATIENT  Once      12/02/18 2111        Thank you for your consult. I will follow-up with  patient. Please contact us if you have any additional questions.    Kandice Christianson PA-C  General Neurology Consult  Neuro Consult [facility] # [id number]

## 2018-12-03 NOTE — SUBJECTIVE & OBJECTIVE
Past Medical History:   Diagnosis Date    Anxiety     Cervical cancer 2014    Chronic back pain     Depression     Diarrhea due to malabsorption 11/14/2018    Fibromyalgia     GERD (gastroesophageal reflux disease)     Hiatal hernia 2014    History of cervical cancer 10/11/2018    Hx of psychiatric care     Cymbalta, trazodone    Hypertension     Hypomagnesemia 11/21/2018    Lactose intolerance     Metastatic squamous cell carcinoma to lymph node 10/11/2018    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 drug 11/14/2018    Osteoarthritis of back     Psychiatric problem     Stroke 1972     Past Surgical History:   Procedure Laterality Date    BILATERAL OOPHORECTOMY  2015    CHOLECYSTECTOMY  11/09/2016    Done at [facility], path showed chronic cholecystitis and gallstones    CHOLECYSTECTOMY-LAPAROSCOPIC N/A 11/9/2016    Performed by Joshua Goldberg, MD at [facility] OR [facility]    cold knife conization  2014    COLONOSCOPY  2014    COLONOSCOPY N/A 10/24/2016    at [facility], Dr Thomas    COLONOSCOPY N/A 5/18/2018    Procedure: COLONOSCOPY;  Surgeon: Arden Gutiérrez MD;  Location: [facility] ([address] [place] [address]);  Service: Endoscopy;  Laterality: N/A;    COLONOSCOPY N/A 5/18/2018    Performed by Arden Gutiérrez MD at [facility] (4TH FLR)    COLONOSCOPY N/A 10/24/2016    Performed by Arden Gutiérrez MD at [facility] (4TH FLR)    ESOPHAGOGASTRODUODENOSCOPY  2014    ESOPHAGOGASTRODUODENOSCOPY (EGD) N/A 10/24/2016    Performed by Arden Gutiérrez MD at [facility] (4TH FLR)    HYSTERECTOMY  2014    TV for cervical cancer    LYMPHADENECTOMY, RETROPERITONEUM Right 9/17/2018    Performed by Sebas Reed MD at [facility] OR 2ND FLR    OOPHORECTOMY      RETROPERITONEAL LYMPHADENECTOMY Right 9/17/2018    Procedure: LYMPHADENECTOMY, RETROPERITONEUM;  Surgeon: Sebas Reed MD;  Location: [facility] OR [address] [place] [address];  Service: General;  Laterality: Right;  ILIAC     Review of patient's allergies indicates:   Allergen Reactions     Bee sting [allergen ext-venom-honey bee]      Rash      Grass pollen-bermuda, standard      rash     No current facility-administered medications on file prior to encounter.      Current Outpatient Medications on File Prior to Encounter   Medication Sig    arm brace (NEOPRENE WRIST SPLINT SUPPORT) Misc 1 Units by Misc.(Non-Drug; Combo Route) route every evening.    cholecalciferol, vitamin D3, 2,000 unit Cap Take 1 capsule by mouth once daily.    conjugated estrogens (PREMARIN) vaginal cream Place 0.5 g vaginally twice a week.    cyclobenzaprine (FLEXERIL) 10 MG tablet TAKE 1 TABLET (10 MG TOTAL) BY MOUTH NIGHTLY AS NEEDED FOR MUSCLE SPASMS.    diphenoxylate-atropine 2.5-0.025 mg (LOMOTIL) 2.5-0.025 mg per tablet Take 1 after each bowel movement. Up to 8 per day.    DULoxetine (CYMBALTA) 60 MG capsule TAKE 1 CAPSULE (60 MG TOTAL) BY MOUTH ONCE DAILY.    estradiol (ESTRACE) 1 MG tablet Take 1 mg by mouth once daily.    gabapentin (NEURONTIN) 300 MG capsule TAKE 1 CAPSULE (300 MG TOTAL) BY MOUTH 2 (TWO) TIMES DAILY.    lisinopril 10 MG tablet TAKE 1 TABLET (10 MG TOTAL) BY MOUTH ONCE DAILY.    magnesium oxide (MAG-OX) 400 mg (241.3 mg magnesium) tablet Take 1 tablet (400 mg total) by mouth once daily.    meclizine (ANTIVERT) 25 mg tablet Take 1 tablet (25 mg total) by mouth 3 (three) times daily as needed for Dizziness.    omeprazole (PRILOSEC) 40 MG capsule Take 1 capsule (40 mg total) by mouth once daily.    oxyCODONE-acetaminophen (PERCOCET) 5-325 mg per tablet Take 1 tablet by mouth every 4 (four) hours as needed.    traMADol (ULTRAM) 50 mg tablet Take 50 mg by mouth every 6 (six) hours as needed for Pain.    traZODone (DESYREL) 50 MG tablet Take 1 tablet (50 mg total) by mouth every evening.     Family History     Problem Relation (Age of Onset)    Breast cancer Maternal Aunt    Cancer Father, Mother    Cervical cancer Mother    Colon cancer Father    Drug abuse Daughter, Daughter    Esophageal  cancer Father    Heart disease Sister, Maternal Grandmother    Suicide Daughter        Tobacco Use    Smoking status: Never Smoker    Smokeless tobacco: Never Used   Substance and Sexual Activity    Alcohol use: No     Alcohol/week: 0.0 oz    Drug use: No    Sexual activity: Yes     Partners: Male     Birth control/protection: None     Comment:  19 years since 1999     Review of Systems   Constitutional: Positive for fatigue. Negative for fever.   HENT: Negative for trouble swallowing.    Eyes: Negative for visual disturbance.   Musculoskeletal: Negative for neck stiffness.   Allergic/Immunologic: Positive for immunocompromised state.   Neurological: Positive for facial asymmetry. Negative for dizziness, tremors, weakness, light-headedness and headaches.     Objective:     Vital Signs (Most Recent):  Temp: 97.6 °F (36.4 °C) (12/03/18 1121)  Pulse: 85 (12/03/18 1129)  Resp: 18 (12/03/18 1121)  BP: 134/74 (12/03/18 1121)  SpO2: 99 % (12/03/18 1121) Vital Signs (24h Range):  Temp:  [97.6 °F (36.4 °C)-98.2 °F (36.8 °C)] 97.6 °F (36.4 °C)  Pulse:  [] 85  Resp:  [16-19] 18  SpO2:  [97 %-100 %] 99 %  BP: (126-141)/(74-94) 134/74     Weight: 100.5 kg (221 lb 9 oz)  Body mass index is 33.69 kg/m².    Physical Exam   Eyes: EOM are normal. Pupils are equal, round, and reactive to light.   Neurological: She has a normal Finger-Nose-Finger Test.   Reflex Scores:       Bicep reflexes are 2+ on the right side and 2+ on the left side.       Brachioradialis reflexes are 2+ on the right side and 2+ on the left side.       Patellar reflexes are 3+ on the right side and 3+ on the left side.      NEUROLOGICAL EXAMINATION:     MENTAL STATUS   Oriented to person.   Oriented to place. Oriented to city.   Oriented to year, month and day.   Follows 2 step commands.   Attention: normal. Concentration: normal.   Level of consciousness: alert  Knowledge: good.   Normal comprehension.     CRANIAL NERVES     CN III, IV, VI    Pupils are equal, round, and reactive to light.  Extraocular motions are normal.   Nystagmus: none   Diplopia: none    CN VIII   Hearing: intact    CN IX, X   Palate: symmetric    CN XII   Tongue atrophy: no bite marks.       Left hemifacial spasm     MOTOR EXAM   Muscle bulk: normal  Overall muscle tone: normal  Right arm pronator drift: absent  Left arm pronator drift: absent       Moving all extremities antigravity      REFLEXES     Reflexes   Right brachioradialis: 2+  Left brachioradialis: 2+  Right biceps: 2+  Left biceps: 2+  Right patellar: 3+  Left patellar: 3+  Right plantar: normal  Left plantar: normal    SENSORY EXAM   Light touch normal.   Right leg vibration: decreased from ankle  Left leg vibration: decreased from ankle    GAIT AND COORDINATION     Gait  Gait: (deferred)     Coordination   Finger to nose coordination: normal    Tremor   Resting tremor: absent    Significant Labs:   CBC:   Recent Labs   Lab 12/02/18 1926 12/02/18 1934 12/03/18  0614   WBC 5.75  --  4.81   HGB 11.5*  --  11.0*   HCT 35.0* 35* 32.7*   *  --  123*     CMP:   Recent Labs   Lab 12/02/18 1926 12/03/18  0613   * 128*    138   K 3.2* 3.3*   CL 96 99   CO2 24 28   BUN 19 15   CREATININE 1.2 1.0   CALCIUM 7.0* 7.0*   MG 0.7* 0.9*   PROT 6.9 6.5   ALBUMIN 3.4* 3.1*   BILITOT 0.6 0.6   ALKPHOS 120 112   AST 31 28   ALT 54* 51*   ANIONGAP 17* 11   EGFRNONAA 51.0* >60.0     Inflammatory Markers: No results for input(s): SEDRATE, CRP, PROCAL in the last 48 hours.  Urine Culture: No results for input(s): LABURIN in the last 48 hours.  Urine Studies: No results for input(s): COLORU, APPEARANCEUA, PHUR, SPECGRAV, PROTEINUA, GLUCUA, KETONESU, BILIRUBINUA, OCCULTUA, NITRITE, UROBILINOGEN, LEUKOCYTESUR, RBCUA, WBCUA, BACTERIA, SQUAMEPITHEL, HYALINECASTS in the last 48 hours.    Invalid input(s): WRIGHTSUR  All pertinent lab results from the past 24 hours have been reviewed.    Significant Imaging: I have reviewed  and interpreted all pertinent imaging results/findings within the past 24 hours.     CT Head WO contrast (12/2/18):  There is no evidence of acute major vascular territory infarct, hemorrhage, or mass.  There is no hydrocephalus.  There are no abnormal extra-axial fluid collections.  The paranasal sinuses and mastoid air cells are clear, and there is no evidence of calvarial fracture.  The visualized soft tissues are unremarkable.    MRI/MRA Brain WO contrast (12/2/18):  No evidence of diffusion restriction suggest acute infarction. No acute intracranial process on this limited examination.  Unremarkable MRA of the intracranial circulation.

## 2018-12-03 NOTE — ASSESSMENT & PLAN NOTE
--h/o cervical cancer s/p surgery in 2015  --now on cisplatin (s/p 4 cycles, last 11/28) and radiation (M-F)  --no evidence of brain mets on imaging, consider MRI w contrast to confirm

## 2018-12-03 NOTE — HPI
Pt is a 53 year old female with metastatic cervical cancer who presented to the ED with aphasia and hypertonic extremities. Per patient history, she was sitting at home and had sudden onset fist clenching/muscle spasms and was unable to speak. Says the symptoms are starting to improve but still present - speech continues to be slurred. In ED, stroke code was called - imaging showed no evidence of stroke. Labs did show low magnesium, calcium, and potassium, however. Gyn Onc was consulted for further management. Pt reports that nothing like this has happened to her before. Is on chemotherapy and radiation for her recurrent cancer. (She is s/p surgery in 2015.) S/p 4 cycles of cisplatin (last 11/28.) Patient has experienced vomiting for the past few days. Has not been able to eat/drink much. Mild abdominal pain. No vaginal bleeding. Should be noted that patient was referred to social work last week for concerns of self-harm behavior.

## 2018-12-03 NOTE — ASSESSMENT & PLAN NOTE
--Mg .7, Ca2+ 7.0 (corrected - 7.5), K 3.2  --replacement ordered by ED physician  --recheck labs in am, replace PRN

## 2018-12-03 NOTE — CONSULTS
Patient seen and evaluated. Will admit to gyn onc service. Full H&P to follow.    Pelon Burden MD  PGY2, OBGYN  [facility]

## 2018-12-03 NOTE — SUBJECTIVE & OBJECTIVE
Past Medical History:   Diagnosis Date    Anxiety     Cervical cancer 2014    Chronic back pain     Depression     Diarrhea due to malabsorption 11/14/2018    Fibromyalgia     GERD (gastroesophageal reflux disease)     Hiatal hernia 2014    History of cervical cancer 10/11/2018    Hx of psychiatric care     Cymbalta, trazodone    Hypertension     Hypomagnesemia 11/21/2018    Lactose intolerance     Metastatic squamous cell carcinoma to lymph node 10/11/2018    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 drug 11/14/2018    Osteoarthritis of back     Psychiatric problem     Stroke 1972     Past Surgical History:   Procedure Laterality Date    BILATERAL OOPHORECTOMY  2015    CHOLECYSTECTOMY  11/09/2016    Done at [facility], path showed chronic cholecystitis and gallstones    CHOLECYSTECTOMY-LAPAROSCOPIC N/A 11/9/2016    Performed by Joshua Goldberg, MD at [facility] OR 2ND [facility]    cold knife conization  2014    COLONOSCOPY  2014    COLONOSCOPY N/A 10/24/2016    at [facility], Dr Thomas    COLONOSCOPY N/A 5/18/2018    Procedure: COLONOSCOPY;  Surgeon: Arden Gutiérrez MD;  Location: [facility] ([address] [place] [address]);  Service: Endoscopy;  Laterality: N/A;    COLONOSCOPY N/A 5/18/2018    Performed by Arden Gutiérrez MD at [facility] (4TH FLR)    COLONOSCOPY N/A 10/24/2016    Performed by Arden Gutiérrez MD at [facility] (4TH FLR)    ESOPHAGOGASTRODUODENOSCOPY  2014    ESOPHAGOGASTRODUODENOSCOPY (EGD) N/A 10/24/2016    Performed by Arden Gutiérrez MD at [facility] (4TH FLR)    HYSTERECTOMY  2014    [facility] for [facility] cancer    LYMPHADENECTOMY, RETROPERITONEUM Right 9/17/2018    Performed by Sebas Reed MD at [facility] OR 2ND FLR    OOPHORECTOMY      RETROPERITONEAL LYMPHADENECTOMY Right 9/17/2018    Procedure: LYMPHADENECTOMY, RETROPERITONEUM;  Surgeon: Sebas Reed MD;  Location: [facility] OR [address] [place] [address];  Service: General;  Laterality: Right;  ILIAC     Family History   Problem Relation Age of Onset    Heart disease  Sister     Heart disease Maternal Grandmother     Colon cancer Father     Esophageal cancer Father     Cancer Father         Lung-smoker    Cancer Mother         Cervical    Cervical cancer Mother     Breast cancer Maternal Aunt     Suicide Daughter         jumped from parking structure    Drug abuse Daughter     Drug abuse Daughter     Rectal cancer Neg Hx     Stomach cancer Neg Hx     Crohn's disease Neg Hx     Ulcerative colitis Neg Hx     Diabetes Neg Hx     Hypertension Neg Hx      Social History     Tobacco Use    Smoking status: Never Smoker    Smokeless tobacco: Never Used   Substance Use Topics    Alcohol use: No     Alcohol/week: 0.0 oz    Drug use: No     Review of patient's allergies indicates:   Allergen Reactions    Bee sting [allergen ext-venom-honey bee]      Rash      Grass pollen-bermuda, standard      rash       Medications: I have reviewed the current medication administration record.      (Not in a hospital admission)    Review of Systems   Constitutional: Positive for diaphoresis and fatigue. Negative for appetite change, chills and fever.   HENT: Negative for ear discharge and ear pain.    Eyes: Negative for pain and itching.   Respiratory: Negative for cough and choking.    Gastrointestinal: Negative for abdominal pain.   Genitourinary: Negative for flank pain.   Neurological: Positive for seizures and facial asymmetry.     Objective:     Vital Signs (Most Recent):  Temp: 97.8 °F (36.6 °C) (12/02/18 1845)  Pulse: (!) 116 (12/02/18 1845)  Resp: 18 (12/02/18 1845)  BP: (!) 140/84 (12/02/18 1845)  SpO2: 100 % (12/02/18 1845)    Vital Signs Range (Last 24H):  Temp:  [97.8 °F (36.6 °C)]   Pulse:  [116]   Resp:  [18]   BP: (140)/(84)   SpO2:  [100 %]     Physical Exam    Neurological Exam:     MSE- Alert and awake, Orientation x 3 with intact comprehension of speech, follows complex commands, limited vocabulary, naming intact   CN - Pupils reactive to light, VF intact, EOMI,  bilateral facial symmetry and sensation intact   Strength - at least 4 / 5 all 4 ext   Sensory - intact LT all 4 limbs   Co - ordination - intact FTN and HTS       Laboratory:  CMP: No results for input(s): GLUCOSE, CALCIUM, ALBUMIN, PROT, NA, K, CO2, CL, BUN, CREATININE, ALKPHOS, ALT, AST, BILITOT in the last 24 hours.  CBC:   Recent Labs   Lab 11/26/18  1101   WBC 4.09   RBC 4.27   HGB 11.4*   HCT 37.0   *   MCV 87   MCH 26.7*   MCHC 30.8*     Lipid Panel: No results for input(s): CHOL, LDLCALC, HDL, TRIG in the last 168 hours.  Coagulation: No results for input(s): PT, INR, APTT in the last 168 hours.  Hgb A1C: No results for input(s): HGBA1C in the last 168 hours.  TSH: No results for input(s): TSH in the last 168 hours.    Diagnostic Results:      Brain imaging:    MRI brain - unremarkable, no ischemic changes     Vessel Imaging:    MRA brain - neg for any acute occlusion     Cardiac Evaluation:     None

## 2018-12-03 NOTE — ASSESSMENT & PLAN NOTE
--underwent stroke eval per [facility] neuro: low suspicion for stroke  --episode may be related to seizure: general neurology consult placed and paged  ----will start lacosamide empirically per [facility] neuro recs  ----pt may need EEG. Will defer to gen neuro.  --electrolyte abnormalities noted on admission: will replace Ca2+, Mg, K and recheck in am  --holding all PO meds until neuro eval. May need speech eval prior to resuming PO.

## 2018-12-03 NOTE — PLAN OF CARE
Problem: Patient Care Overview  Goal: Plan of Care Review  Outcome: Ongoing (interventions implemented as appropriate)  Pt AAOx4. Pt placed on regular diet this shift; tolerating well. IV magnesium, calcium, and potassium replaced this shift; pt tolerated well. IVF maintained at 125 ml/hour. Pt has remained free from injury this shift. Pt had no c/o nausea or pain this shift. Bed in low locked position. Call light and personal belongings within reach. Side rails up x2. Nonskid socks in place. All questions and comments addressed at this time. Will continue to monitor.  Fall, Pressure ulcer, infection precautions continued.

## 2018-12-03 NOTE — PLAN OF CARE
CM rounded on patient. White board updated. Patient has a history of cervical cancer s/p hysterectomy 2014 and bilateral oophorectomy 2015 receiving chemoradiation (radiation M-F) and cisplatin weekly. Patient is s/p 4 cycles of cisplatin (last dose 11/28/18). Patient admitted on 12/2/18 for seizure-like activity and electrolyte abnormalities. Electrolyte replacements given, see MAR for details. Daily labs ordered. MD consulted Vascular Neurology and Neurology. Vascular Neurology ruled out stroke. Neurology recs for repeat MRI brain (epilepsy protocol), routine EEG and serum paraneoplastic panel. Patient placed on Vimpat 100 mg BID after 200 mg loading dose. Patient resting comfortably in bed. Patient c/o mild back pain but denied the need for pain medication when CM discussed PRN pain medication orders. VSS. Patient receiving IVF: dextrose 5 % and 0.45 % NaCl with KCl 20 mEq infusion  :  125 mL/hr  :  Intravenous  :  Continuous. CM and SW to continue to follow for any discharge needs. No discharge identified at this time. Patient stated her  is her support at home and will provide transportation at discharge. CM to schedule follow up appts at time of discharge.    Parul Talbert RN, CM  [facility]   [phone] -[phone]

## 2018-12-03 NOTE — ASSESSMENT & PLAN NOTE
Ongoing depression with mood congruent auditory hallucinations and suicidal ideation without intent or plan.  Patient treated x 1 year (approximately) with current psychotropic regimen (Cymbalta, Desyrel) with minimal perceived benefit.    Patient with limited ability to follow through with outpatient psychiatric referral- May benefit from inpatient psychiatry consult, if medically appropriate.    Patient is willing to follow-up with me for supportive therapy inpatient and outpatient, as able.

## 2018-12-03 NOTE — ED PROVIDER NOTES
Encounter Date: 12/2/2018       History     Chief Complaint   Patient presents with    Aphasia     pt ws lying in bed, called for family member, and when he  got there pt couldn't get her words out and hands were clenched shut. on chemo and radiation. hx of stroke. SOB as well. LkN: today at 6pm       Patient brought in by her  via private vehicle because she was last seen normal at 5:00 p.m. and at 6:00 p.m. when he went to wake her from her usual nap he found that she was aphasic.  Her speech is slightly improving at this time.  She has an old left facial droop per the  that resolves at 1 point but has returned.  She is clenching both of her hands.  Yesterday she was vomiting because she was on chemo this week.  Typical vomiting post chemo.  No abdominal pain at this time.  Otherwise no focal neurological deficits reported by the .  Stroke code was called from triage the patient went directly to CT scan.      She has a history of hysterectomy for cervical cancer in 2015 in [place], [place]  Cycle 4 of cisplatin for concurrent chemoradiation for SCCA of a right common iliac LN on 11/28  History of cervical cancer years ago.     Reports that she tried to hurt herself last week by bending her fingers backwards on recent note from office. No suicidal ideation.      Chemo labs shows low mag 4 d ago. Given 2 grams IV and on 800 mg mag oxide daily.        .     Her oncologic history is:  Referring physician:  Dr. Sebas Reed  Reason for referral:  Incisional biopsy of retroperitoneal periaortic node that shows squamous cell carcinoma consistent with gynecologic primary              Review of patient's allergies indicates:   Allergen Reactions    Bee sting [allergen ext-venom-honey bee]      Rash      Grass pollen-bermuda, standard      rash     Past Medical History:   Diagnosis Date    Anxiety     Cervical cancer 2014    Chronic back pain     Depression     Diarrhea due to malabsorption  11/14/2018    Fibromyalgia     GERD (gastroesophageal reflux disease)     Hiatal hernia 2014    History of cervical cancer 10/11/2018    Hx of psychiatric care     Cymbalta, trazodone    Hypertension     Hypomagnesemia 11/21/2018    Lactose intolerance     Metastatic squamous cell carcinoma to lymph node 10/11/2018    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 drug 11/14/2018    Osteoarthritis of back     Psychiatric problem     Stroke 1972     Past Surgical History:   Procedure Laterality Date    BILATERAL OOPHORECTOMY  2015    CHOLECYSTECTOMY  11/09/2016    Done at [facility], path showed chronic cholecystitis and gallstones    CHOLECYSTECTOMY-LAPAROSCOPIC N/A 11/9/2016    [facility] 2[facility]    cold knife conization  2014    COLONOSCOPY  2014    COLONOSCOPY N/A 10/24/2016    at [facility], Dr Thomas    COLONOSCOPY N/A 5/18/2018    Procedure: COLONOSCOPY;  Surgeon: Arden Gutiérrez MD;  Location: [facility] (4TH [facility]);  Service: Endoscopy;  Laterality: N/A;    COLONOSCOPY N/A 5/18/2018    Performed by Arden [facility]rez [facility] at [facility] (4TH FLR)    COLONOSCOPY N/A 10/24/2016    Performed by Arden Gutiérrez [facility] at [facility] (4TH FLR)    ESOPHAGOGASTRODUODENOSCOPY  2014    ESOPHAGOGASTRODUODENOSCOPY (EGD) N/A 10/24/2016    Performed by Arden Guti[facility]rez MD at [facility] (4TH FLR)    HYSTERECTOMY  2014    [facility] for cervical cancer    LYMPHADENECTOMY, RETROPERITONEUM Right 9/17/2018    [facility]    OOPHORECTOMY      RETROPERITONEAL LYMPHADENECTOMY Right 9/17/2018    Procedure: LYMPHADENECTOMY, RETROPERITONEUM;  Surgeon: Sebas Reed MD;  Location: [facility] OR [address] [place] [address];  Service: General;  Laterality: Right;  ILIAC     Family History   Problem Relation Age of Onset    Heart disease Sister     Heart disease Maternal Grandmother     Colon cancer Father     Esophageal cancer Father     Cancer Father         Lung-smoker    Cancer  Mother         Cervical    Cervical cancer Mother     Breast cancer Maternal Aunt     Suicide Daughter         jumped from parking structure    Drug abuse Daughter     Drug abuse Daughter     Rectal cancer Neg Hx     Stomach cancer Neg Hx     Crohn's disease Neg Hx     Ulcerative colitis Neg Hx     Diabetes Neg Hx     Hypertension Neg Hx      Social History     Tobacco Use    Smoking status: Never Smoker    Smokeless tobacco: Never Used   Substance Use Topics    Alcohol use: No     Alcohol/week: 0.0 oz    Drug use: No     Review of Systems   Constitutional: Negative.    HENT: Negative.    Eyes: Negative.    Respiratory: Negative.    Cardiovascular: Negative.    Gastrointestinal: Negative.    Endocrine: Negative.    Genitourinary: Negative.    Musculoskeletal:        Spasm in bilateral wrist.  Clinching bilateral hands.   Allergic/Immunologic: Negative.    Neurological:        Left facial droop.  Also slurred speech   Hematological: Negative.        Physical Exam     Initial Vitals [12/02/18 1845]   BP Pulse Resp Temp SpO2   (!) 140/84 (!) 116 18 97.8 °F (36.6 °C) 100 %      MAP       --         Physical Exam    Nursing note and vitals reviewed.  Constitutional: She appears well-developed and well-nourished.   HENT:   Head: Normocephalic and atraumatic.   Mouth/Throat: Oropharynx is clear and moist.   Patient clenching mouth; speech slurred.  Able to pronounce name.  Left facial droop.   Eyes: EOM are normal. Pupils are equal, round, and reactive to light.   Neck: Neck supple.   Cardiovascular: Normal rate, normal heart sounds and intact distal pulses.   Pulmonary/Chest: Breath sounds normal.   Abdominal: Soft.   Neurological: She is alert. She has normal strength. No sensory deficit. GCS score is 15. GCS eye subscore is 4. GCS verbal subscore is 5. GCS motor subscore is 6.   Maik bilateral hands and flexing the wrist.  Slight left facial droop.  Slurred speech.  Bilateral lower extremity strength  intact. Patient able to say name and date of birth with slurred speech.   Skin: Skin is warm and dry.   Psychiatric:   Anxious         ED Course   Procedures  Labs Reviewed   POCT GLUCOSE - Abnormal; Notable for the following components:       Result Value    POCT Glucose 176 (*)     All other components within normal limits   CBC W/ AUTO DIFFERENTIAL   COMPREHENSIVE METABOLIC PANEL   PROTIME-INR   TSH   LIPID PANEL   POCT GLUCOSE, HAND-HELD DEVICE     EKG Readings: (Independently Interpreted)   Normal sinus rhythm at 99.  Normal intervals.  .  No change from previous.  No STEMI.       Imaging Results          CT Head Without Contrast (In process)                               Attending Attestation:             Attending ED Notes:   Patient last seen normal at 5:00 p.m..  Slurred speech since 6:00 p.m..  Slight facial droop.  Patient minimally improved after CT scan with for intelligible speech.  Stroke team at bedside requesting MRI.  We will send labs.  Note from end of November reports patient had had some suicidal thoughts.  We will check for co ingestion.  She has no suicidal ideation reported at this time.       phone number is [phone] his name is all over.  Patient still with some slow overall speech and generalized weakness. I spoke with stroke team.  No stroke on imaging.  We will admit this patient for low magnesium calcium and potassium.  She has had her gallbladder taken out.  She has no abdominal pain or tenderness on palpation. No pain on palpation. She reports she vomited like this after her chemo.  She has scheduled radiation tomorrow at 2:00 pm    I will call Oncology team.  Corrected calcium is 7.5.    I spoke with Gynecology on-call.  They will admit this patient to observation.          ED Course as of Dec 02 2023   Sun Dec 02, 2018   2022 MRI Brain Without Contrast [RB]      ED Course User Index  [RB] Ilda Hussein MD     Clinical Impression:  Hypokalemia, hypomagnesemia,  hypocalcemia.                                Ilda Hussein MD  12/02/18 1914       Ilda Hussein MD  12/02/18 2029       Ilda Hussein MD  12/02/18 2047

## 2018-12-03 NOTE — SUBJECTIVE & OBJECTIVE
Oncology Treatment Plan:   OP GYN CISPLATIN (Weekly)    Oncology History:   Her oncologic history is:  Referring physician:  Dr. Sebas Reed  Reason for referral:  Incisional biopsy of retroperitoneal periaortic node that shows squamous cell carcinoma consistent with gynecologic primary     Aug 2018: chronic abdominal pain referred for consideration of biopsy of a 2 cm right common iliac artery bifurcation node  Node was evident on scan in 4/18 and again in 7/18 - no change  Patient's symptoms are non specific, diffuse, have not resulted in weight loss  Patient already carries a diagnosis of diffuse pain syndromes including fibromyalgia     Aug 2018: PET scan Right common iliac lymph node suspicious for malignancy.  This could be benign or malignant lymphoproliferative disorder metastatic disease.  This is an a risky position for percutaneous biopsy.      Sept 17, 2018: Underwent retroperitoneal approach for incisional biopsy of right common iliac LN. Pathology showed:  Supplemental Diagnosis  METASTATIC HIGH-GRADE CARCINOMA GROWING WITH SQUAMOUS CELL FEATURES. THE RESULTS OF  ADDITIONAL IMMUNOSTAINS ARE AS FOLLOWS: CK5/6, P63 AND CK7 ARE POSITIVE. TTF, S100 AND  CK20 ARE ALL NEGATIVE. THE CONTROLS STAIN APPROPRIATELY.  NOTE: GIVEN THESE RESULTS THIS MAY REPRESENT A PRIMARY IN THE URINARY OR LOWER GYN  TRACTS. DR. JAMESON HAS ALSO REVIEWED THIS CASE AND CONCURS WITH THE DIAGNOSIS.     Pap:  June 6, 2018:  Normal     She has a history of hysterectomy for cervical cancer in 2015 in [place], [place]. Hyst was for abnormal pap. Incidental finding of cervical cancer on the hyst specimen. She does not know any other details.         Past Medical History:   Diagnosis Date    Anxiety     Cervical cancer 2014    Chronic back pain     Depression     Diarrhea due to malabsorption 11/14/2018    Fibromyalgia     GERD (gastroesophageal reflux disease)     Hiatal hernia 2014    History of cervical cancer 10/11/2018    Hx of  psychiatric care     Cymbalta, trazodone    Hypertension     Hypomagnesemia 11/21/2018    Lactose intolerance     Metastatic squamous cell carcinoma to lymph node 10/11/2018    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 drug 11/14/2018    Osteoarthritis of back     Psychiatric problem     Stroke 1972     Past Surgical History:   Procedure Laterality Date    BILATERAL OOPHORECTOMY  2015    CHOLECYSTECTOMY  11/09/2016    Done at [facility], path showed chronic cholecystitis and gallstones    CHOLECYSTECTOMY-LAPAROSCOPIC N/A 11/9/2016    [facility] 2ND F[facility]    cold knife conization  2014    COLONOSCOPY  2014    [facility]    COLONOSCOPY N/A 5/18/2018    Procedure: COLONOSCOPY;  Surgeon: Arden Gutiérrez MD;  Location: [facility] (4TH FLR);  Service: Endoscopy;  Laterality: N/A;    COLONOSCOPY N/A 5/18/2018    [facility] Ar[facility] (4[facility])    COLONOSCOPY N/A 10/24/2016    Performed by Ar[facility] at [facility] (4TH FLR)    ESOPHAGOGASTRODUODENOSCOPY  2014    ESOPHAGOGASTRODUODENOSCOPY (EGD) N/A 10/24/2016    Performed by Arden [facility]rez MD at [facility] (4TH FLR)    HYSTERECTOMY  2014    [facility] for cervical cancer    LYMPHADENECTOMY, RETROPERITONEUM Right 9/17/2018    [facility] by Sebas Reed [facility] at [facility] 2ND [facility]    OOPHORECTOMY      RETROPERITONEAL LYMPHADENECTOMY Right 9/17/2018    Procedure: LYMPHADENECTOMY, RETROPERITONEUM;  Surgeon: Sebas Reed MD;  Location: [facility] OR [address] [place] [address];  Service: General;  Laterality: Right;  ILIAC     Family History     Problem Relation (Age of Onset)    Breast cancer Maternal Aunt    Cancer Father, Mother    Cervical cancer Mother    Colon cancer Father    Drug abuse Daughter, Daughter    Esophageal cancer Father    Heart disease Sister, Maternal Grandmother    Suicide Daughter        Tobacco Use    Smoking status: Never Smoker    Smokeless tobacco: Never  Used   Substance and Sexual Activity    Alcohol use: No     Alcohol/week: 0.0 oz    Drug use: No    Sexual activity: Yes     Partners: Male     Birth control/protection: None     Comment:  19 years since 1999         (Not in a hospital admission)    Review of patient's allergies indicates:   Allergen Reactions    Bee sting [allergen ext-venom-honey bee]      Rash      Grass pollen-bermuda, standard      rash       Review of Systems   Constitutional: Negative for chills and fever.   Eyes: Negative for visual disturbance.   Respiratory: Negative for shortness of breath.    Cardiovascular: Positive for chest pain (chronic).   Gastrointestinal: Positive for abdominal pain, nausea and vomiting.   Genitourinary: Negative for vaginal bleeding and vaginal discharge.   Neurological: Negative for headaches.        Slurred speech, hypertonic upper limbs      Objective:     Vital Signs (Most Recent):  Temp: 97.8 °F (36.6 °C) (12/02/18 1845)  Pulse: 89 (12/02/18 2031)  Resp: 18 (12/02/18 2031)  BP: 137/89 (12/02/18 2031)  SpO2: 100 % (12/02/18 2031) Vital Signs (24h Range):  Temp:  [97.8 °F (36.6 °C)] 97.8 °F (36.6 °C)  Pulse:  [] 89  Resp:  [18] 18  SpO2:  [100 %] 100 %  BP: (137-140)/(84-89) 137/89     Weight: 103 kg (227 lb)  Body mass index is 34.52 kg/m².    Physical Exam:   Constitutional: She is oriented to person, place, and time. She appears well-developed and well-nourished. No distress.    HENT:   Head: Normocephalic and atraumatic.       Pulmonary/Chest: Effort normal. No respiratory distress.        Abdominal: Soft. She exhibits no distension. There is no tenderness. There is no rebound.             Musculoskeletal: Moves all extremeties.       Neurological: She is alert and oriented to person, place, and time.   Does not appear post-ictal, + assymetric smile, facial movements otherwise symmetric, EOMI, sensation in tact throughout, strength equal in bilateral upper limbs, negative Romberg    Skin:  Skin is warm and dry. She is not diaphoretic.    Psychiatric: She has a normal mood and affect. Her behavior is normal. Judgment and thought content normal.       Laboratory:  CBC:   Recent Labs   Lab 12/02/18 1926   WBC 5.75   HGB 11.5*   HCT 35.0*   *    and CMP:   Recent Labs   Lab 12/02/18 1926      K 3.2*   CL 96   CO2 24   *   BUN 19   CREATININE 1.2   CALCIUM 7.0*   PROT 6.9   ALBUMIN 3.4*   BILITOT 0.6   ALKPHOS 120   AST 31   ALT 54*   ANIONGAP 17*   EGFRNONAA 51.0*     Mg: .7    Diagnostic Results:  MRI: No evidence of diffusion restriction suggest acute infarction. No acute intracranial process on this limited examination. Unremarkable MRA of the intracranial circulation.  CT: No convincing acute intracranial abnormalities, noting motion limited exam.  CXR: No definite acute cardiopulmonary finding identified on this hypoventilatory single-view.

## 2018-12-03 NOTE — ED NOTES
Attempted to obtain clean catch urine specimen, pt unable to urinate at this time, Dr. Espinoza made aware.

## 2018-12-04 LAB
ALBUMIN SERPL BCP-MCNC: 3 G/DL
ALP SERPL-CCNC: 118 U/L
ALT SERPL W/O P-5'-P-CCNC: 50 U/L
ANION GAP SERPL CALC-SCNC: 8 MMOL/L
ANION GAP SERPL CALC-SCNC: 9 MMOL/L
AST SERPL-CCNC: 24 U/L
BASOPHILS # BLD AUTO: 0.01 K/UL
BASOPHILS NFR BLD: 0.3 %
BILIRUB SERPL-MCNC: 0.4 MG/DL
BUN SERPL-MCNC: 8 MG/DL
BUN SERPL-MCNC: 9 MG/DL
CALCIUM SERPL-MCNC: 7.3 MG/DL
CALCIUM SERPL-MCNC: 8 MG/DL
CHLORIDE SERPL-SCNC: 100 MMOL/L
CHLORIDE SERPL-SCNC: 103 MMOL/L
CO2 SERPL-SCNC: 27 MMOL/L
CO2 SERPL-SCNC: 29 MMOL/L
CREAT SERPL-MCNC: 0.8 MG/DL
CREAT SERPL-MCNC: 1 MG/DL
DIFFERENTIAL METHOD: ABNORMAL
EOSINOPHIL # BLD AUTO: 0.1 K/UL
EOSINOPHIL NFR BLD: 1.5 %
ERYTHROCYTE [DISTWIDTH] IN BLOOD BY AUTOMATED COUNT: 14.3 %
EST. GFR  (AFRICAN AMERICAN): >60 ML/MIN/1.73 M^2
EST. GFR  (AFRICAN AMERICAN): >60 ML/MIN/1.73 M^2
EST. GFR  (NON AFRICAN AMERICAN): >60 ML/MIN/1.73 M^2
EST. GFR  (NON AFRICAN AMERICAN): >60 ML/MIN/1.73 M^2
GLUCOSE SERPL-MCNC: 113 MG/DL
GLUCOSE SERPL-MCNC: 130 MG/DL
HCT VFR BLD AUTO: 32.8 %
HGB BLD-MCNC: 10.5 G/DL
IMM GRANULOCYTES # BLD AUTO: 0.01 K/UL
IMM GRANULOCYTES NFR BLD AUTO: 0.3 %
LYMPHOCYTES # BLD AUTO: 0.2 K/UL
LYMPHOCYTES NFR BLD: 7.3 %
MAGNESIUM SERPL-MCNC: 1.5 MG/DL
MAGNESIUM SERPL-MCNC: 1.9 MG/DL
MCH RBC QN AUTO: 27.3 PG
MCHC RBC AUTO-ENTMCNC: 32 G/DL
MCV RBC AUTO: 85 FL
MONOCYTES # BLD AUTO: 0.5 K/UL
MONOCYTES NFR BLD: 13.9 %
NEUTROPHILS # BLD AUTO: 2.5 K/UL
NEUTROPHILS NFR BLD: 76.7 %
NRBC BLD-RTO: 0 /100 WBC
PHOSPHATE SERPL-MCNC: 3.1 MG/DL
PLATELET # BLD AUTO: 128 K/UL
PMV BLD AUTO: 9.7 FL
POTASSIUM SERPL-SCNC: 3.3 MMOL/L
POTASSIUM SERPL-SCNC: 3.5 MMOL/L
PROT SERPL-MCNC: 6.2 G/DL
RBC # BLD AUTO: 3.85 M/UL
SODIUM SERPL-SCNC: 138 MMOL/L
SODIUM SERPL-SCNC: 138 MMOL/L
WBC # BLD AUTO: 3.31 K/UL

## 2018-12-04 PROCEDURE — 25000003 PHARM REV CODE 250: Performed by: STUDENT IN AN ORGANIZED HEALTH CARE EDUCATION/TRAINING PROGRAM

## 2018-12-04 PROCEDURE — G9174 SPEECH LANG CURRENT STATUS: HCPCS | Mod: CJ

## 2018-12-04 PROCEDURE — 86256 FLUORESCENT ANTIBODY TITER: CPT | Mod: 91

## 2018-12-04 PROCEDURE — 63600175 PHARM REV CODE 636 W HCPCS: Performed by: STUDENT IN AN ORGANIZED HEALTH CARE EDUCATION/TRAINING PROGRAM

## 2018-12-04 PROCEDURE — 80053 COMPREHEN METABOLIC PANEL: CPT

## 2018-12-04 PROCEDURE — G9175 SPEECH LANG GOAL STATUS: HCPCS | Mod: CI

## 2018-12-04 PROCEDURE — A9585 GADOBUTROL INJECTION: HCPCS | Performed by: OBSTETRICS & GYNECOLOGY

## 2018-12-04 PROCEDURE — 93010 ELECTROCARDIOGRAM REPORT: CPT | Mod: ,,, | Performed by: INTERNAL MEDICINE

## 2018-12-04 PROCEDURE — 92523 SPEECH SOUND LANG COMPREHEN: CPT

## 2018-12-04 PROCEDURE — 90792 PSYCH DIAG EVAL W/MED SRVCS: CPT | Mod: AF,HB,, | Performed by: PSYCHIATRY & NEUROLOGY

## 2018-12-04 PROCEDURE — 80048 BASIC METABOLIC PNL TOTAL CA: CPT

## 2018-12-04 PROCEDURE — 85025 COMPLETE CBC W/AUTO DIFF WBC: CPT

## 2018-12-04 PROCEDURE — 36415 COLL VENOUS BLD VENIPUNCTURE: CPT

## 2018-12-04 PROCEDURE — 83735 ASSAY OF MAGNESIUM: CPT | Mod: 91

## 2018-12-04 PROCEDURE — 99231 SBSQ HOSP IP/OBS SF/LOW 25: CPT | Mod: ,,, | Performed by: OBSTETRICS & GYNECOLOGY

## 2018-12-04 PROCEDURE — 99232 SBSQ HOSP IP/OBS MODERATE 35: CPT | Mod: ,,, | Performed by: PSYCHIATRY & NEUROLOGY

## 2018-12-04 PROCEDURE — 25500020 PHARM REV CODE 255: Performed by: OBSTETRICS & GYNECOLOGY

## 2018-12-04 PROCEDURE — 20600001 HC STEP DOWN PRIVATE ROOM

## 2018-12-04 PROCEDURE — 93005 ELECTROCARDIOGRAM TRACING: CPT

## 2018-12-04 PROCEDURE — 84100 ASSAY OF PHOSPHORUS: CPT

## 2018-12-04 RX ORDER — GADOBUTROL 604.72 MG/ML
10 INJECTION INTRAVENOUS
Status: COMPLETED | OUTPATIENT
Start: 2018-12-04 | End: 2018-12-04

## 2018-12-04 RX ORDER — LACOSAMIDE 50 MG/1
100 TABLET ORAL EVERY 12 HOURS
Status: DISCONTINUED | OUTPATIENT
Start: 2018-12-04 | End: 2018-12-04

## 2018-12-04 RX ORDER — MAGNESIUM SULFATE HEPTAHYDRATE 40 MG/ML
2 INJECTION, SOLUTION INTRAVENOUS ONCE
Status: COMPLETED | OUTPATIENT
Start: 2018-12-04 | End: 2018-12-04

## 2018-12-04 RX ORDER — CALCIUM CARBONATE 200(500)MG
1000 TABLET,CHEWABLE ORAL 3 TIMES DAILY
Status: COMPLETED | OUTPATIENT
Start: 2018-12-04 | End: 2018-12-04

## 2018-12-04 RX ADMIN — GABAPENTIN 300 MG: 300 CAPSULE ORAL at 09:12

## 2018-12-04 RX ADMIN — GADOBUTROL 10 ML: 604.72 INJECTION INTRAVENOUS at 07:12

## 2018-12-04 RX ADMIN — TRAZODONE HYDROCHLORIDE 50 MG: 50 TABLET ORAL at 09:12

## 2018-12-04 RX ADMIN — POTASSIUM BICARBONATE 25 MEQ: 25 TABLET, EFFERVESCENT ORAL at 08:12

## 2018-12-04 RX ADMIN — MAGNESIUM SULFATE IN WATER 2 G: 40 INJECTION, SOLUTION INTRAVENOUS at 03:12

## 2018-12-04 RX ADMIN — LACOSAMIDE 100 MG: 50 TABLET, FILM COATED ORAL at 08:12

## 2018-12-04 RX ADMIN — CALCIUM CARBONATE (ANTACID) CHEW TAB 500 MG 1000 MG: 500 CHEW TAB at 03:12

## 2018-12-04 RX ADMIN — DULOXETINE 60 MG: 60 CAPSULE, DELAYED RELEASE ORAL at 08:12

## 2018-12-04 RX ADMIN — DEXTROSE MONOHYDRATE, SODIUM CHLORIDE, AND POTASSIUM CHLORIDE: 50; 4.5; 1.49 INJECTION, SOLUTION INTRAVENOUS at 05:12

## 2018-12-04 RX ADMIN — LISINOPRIL 10 MG: 10 TABLET ORAL at 08:12

## 2018-12-04 RX ADMIN — GABAPENTIN 300 MG: 300 CAPSULE ORAL at 08:12

## 2018-12-04 RX ADMIN — PANTOPRAZOLE SODIUM 40 MG: 40 TABLET, DELAYED RELEASE ORAL at 08:12

## 2018-12-04 RX ADMIN — CALCIUM CARBONATE (ANTACID) CHEW TAB 500 MG 1000 MG: 500 CHEW TAB at 09:12

## 2018-12-04 RX ADMIN — POTASSIUM BICARBONATE 25 MEQ: 25 TABLET, EFFERVESCENT ORAL at 03:12

## 2018-12-04 RX ADMIN — MAGNESIUM SULFATE IN WATER 2 G: 40 INJECTION, SOLUTION INTRAVENOUS at 08:12

## 2018-12-04 NOTE — SUBJECTIVE & OBJECTIVE
Interval History: Pt reporting some improvement in symptoms. Was evaluated by neuro and heme/onc psych yesterday. Requests psychiatry consult for med management for depression. Denies pain, nausea, vomiting.     Scheduled Meds:   DULoxetine  60 mg Oral Daily    gabapentin  300 mg Oral BID    lacosamide  100 mg Oral Q12H    lisinopril  10 mg Oral Daily    pantoprazole  40 mg Oral Daily    traZODone  50 mg Oral QHS     Continuous Infusions:   dextrose 5 % and 0.45 % NaCl with KCl 20 mEq 125 mL/hr at 12/03/18 0835     PRN Meds:acetaminophen, HYDROcodone-acetaminophen, HYDROcodone-acetaminophen, lorazepam, ondansetron, sodium chloride 0.9%    Review of patient's allergies indicates:   Allergen Reactions    Bee sting [allergen ext-venom-honey bee]      Rash      Grass pollen-bermuda, standard      rash       Objective:     Vital Signs (Most Recent):  Temp: 97.5 °F (36.4 °C) (12/04/18 0412)  Pulse: 90 (12/04/18 0412)  Resp: 18 (12/04/18 0412)  BP: 129/78 (12/04/18 0412)  SpO2: 96 % (12/04/18 0412) Vital Signs (24h Range):  Temp:  [97.5 °F (36.4 °C)-98.2 °F (36.8 °C)] 97.5 °F (36.4 °C)  Pulse:  [] 90  Resp:  [18-20] 18  SpO2:  [93 %-99 %] 96 %  BP: (129-150)/(70-82) 129/78     Weight: 100.5 kg (221 lb 9 oz)  Body mass index is 33.69 kg/m².    Intake/Output - Last 3 Shifts       12/02 0700 - 12/03 0659 12/03 0700 - 12/04 0659    P.O.  480    I.V. (mL/kg) 585.4 (5.8) 3175 (31.6)    IV Piggyback  500    Total Intake(mL/kg) 585.4 (5.8) 4155 (41.3)    Urine (mL/kg/hr)  750 (0.3)    Stool  0    Total Output  750    Net +585.4 +3405          Urine Occurrence 1 x 1 x    Stool Occurrence  1 x             Physical Exam:   Constitutional: She is oriented to person, place, and time. She appears well-developed and well-nourished. No distress.    HENT:   Head: Normocephalic and atraumatic.       Pulmonary/Chest: Effort normal. No respiratory distress.        Abdominal: Soft. She exhibits no distension. There is no  tenderness. There is no rebound and no guarding.             Musculoskeletal: Moves all extremeties.       Neurological: She is alert and oriented to person, place, and time.   Tone now normal in upper extremities. Possible increased tone in lower extremities but appears to be related to patient resisting exam.     Skin: Skin is warm and dry. She is not diaphoretic.    Psychiatric:   Flat affect       Lines/Drains/Airways     Peripheral Intravenous Line                 Peripheral IV - Single Lumen 12/02/18 1920 Left Forearm 1 day         Peripheral IV - Single Lumen 12/02/18 2107 Right Hand 1 day                Laboratory:  BMP:   Recent Labs   Lab 12/02/18 1926 12/03/18  0613 12/03/18  1635   * 128* 162*    138 137   K 3.2* 3.3* 3.1*   CL 96 99 99   CO2 24 28 27   BUN 19 15 12   CREATININE 1.2 1.0 1.1   CALCIUM 7.0* 7.0* 7.5*   MG 0.7* 0.9* 1.1*    and CBC:   Recent Labs   Lab 12/02/18 1926 12/02/18  1934 12/03/18  0614 12/04/18  0540   WBC 5.75  --  4.81 3.31*   HGB 11.5*  --  11.0* 10.5*   HCT 35.0* 35* 32.7* 32.8*   *  --  123* 128*     Mag: .7 > .9 > 1.1  Awaiting am labs    Diagnostic Results:  EEG pending

## 2018-12-04 NOTE — PT/OT/SLP EVAL
Speech Language Pathology Evaluation  Cognitive Communication    Patient Name:  Edita Riley   MRN:  [id number]   [id number]    Admitting Diagnosis: Seizure-like activity    Recommendations:     Recommendations:                General Recommendations:  Cognitive-linguistic therapy, consider ENT consult should hoarse vocal quality persist  Diet recommendations:  Regular, Thin   Aspiration Precautions: Standard aspiration precautions   General Precautions: Standard,    Communication strategies:  none    History:     Past Medical History:   Diagnosis Date    Anxiety     Cervical cancer 2014    Chronic back pain     Depression     Diarrhea due to malabsorption 11/14/2018    Fibromyalgia     GERD (gastroesophageal reflux disease)     Hiatal hernia 2014    History of cervical cancer 10/11/2018    Hx of psychiatric care     Cymbalta, trazodone    Hypertension     Hypomagnesemia 11/21/2018    Lactose intolerance     Metastatic squamous cell carcinoma to lymph node 10/11/2018    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 drug 11/14/2018    Osteoarthritis of back     Psychiatric problem     Stroke 1972       Past Surgical History:   Procedure Laterality Date    BILATERAL OOPHORECTOMY  2015    CHOLECYSTECTOMY  11/09/2016    Done at [facility], path showed chronic cholecystitis and gallstones    CHOLECYSTECTOMY-LAPAROSCOPIC N/A 11/9/2016    Performed by Joshua Goldberg, MD at [facility] OR 2ND FLR    cold knife conization  2014    COLONOSCOPY  2014    [facility]    COLONOSCOPY N/A 5/18/2018    Procedure: COLONOSCOPY;  Surgeon: Arden Gutiérrez MD;  Location: [facility] ([address] [place] [address]);  Service: Endoscopy;  Laterality: N/A;    COLONOSCOPY N/A 5/18/2018    [facility]    COLONOSCOPY N/A 10/24/2016    [facility]    ESOPHAGOGASTRODUODENOSCOPY  2014    ESOPHAGOGASTRODUODENOSCOPY (EGD) N/A  10/24/2016    Performed by Arden [facility]rez MD at [facility] ENDO (4TH FLR)    HYSTERECTOMY  2014    [facility] for cervical cancer    LYMPHADENECTOMY, RETROPERITONEUM Right 9/17/2018    Performed by Sebas Reed MD at [facility] OR 2ND FLR    OOPHORECTOMY      RETROPERITONEAL LYMPHADENECTOMY Right 9/17/2018    Procedure: LYMPHADENECTOMY, RETROPERITONEUM;  Surgeon: Sebas Reed MD;  Location: [facility] OR 2ND FLR;  Service: General;  Laterality: Right;  ILIAC       Social History: Patient lives with daughter (19) and .    Occupation/hobbies/homemaking: Patient does not work. She enjoys fishing.       Subjective     Patient found awake, cooperative, and speaking with neurology team upon ST entry.     Pain/Comfort:  · Pain Rating 1: 0/10    Objective:     COGNITIVE STATUS:  Behavioral Observations: alert, appropriate and cooperative-  Memory:  · Immediate: WFL  · Short Term: WFL  · Long Term: WFL  Orientation: oriented x4  Attention: WFL.  Problem Solving: mildly impaired  Insight Awareness: WFL  Sequencing:   · Functional Events: WFL  · Mental Manipulation: TBA  Pragmatics: WFL  Simple Money/Time Management: impaired money and time management skills     LANGUAGE:   Receptive Language:  Simple y/n Questions: WFL  Complex y/n Questions: WFL  Identification: WFL  1 Step Directions: WFL  2 Step Directions: impaired with before/after statements  Complex Directions: impaired despite repetition    Expressive Language: Patient reports some word finding difficulties, however, none noted in conversation. Will continue to monitor.  Naming:   · Divergent: WFL  · Convergent: impaired  · Confrontational: WFL  Automatic Speech: WFL  Sentence Completion: WFL  Responsive Speech: WFL  Repetition: WFL    Motor Speech: WFL    Voice: hoarse vocal quality which appears to fluctuate throughout session; patient reports hoarse vocal quality is new to current admission    Augmentative Alternative Communication: no    Reading: tba     Writing:  tba     Visual-Spatial: tba      Treatment: SLP provided patient education on SLP role, importance of vocal hygiene, and POC. Patient verbalized understanding of all discussed.     Assessment:   Edita Riley is a 53 y.o. female with an SLP diagnosis of Cognitive-Linguistic Impairment and hoarse vocal quality. ST will continue to follow. Should hoarseness persist, patient would benefit form an ENT consult.     Goals:   Multidisciplinary Problems     SLP Goals        Problem: SLP Goal    Goal Priority Disciplines Outcome   SLP Goal     SLP Ongoing (interventions implemented as appropriate)   Description:  Speech Therapy Short Term Goals  Goal expected to be met by 12/18  1. Patient will answer functional problem solving questions with 85% acc no cues.   2. Patient will solve simple money/time management problems with 75% acc min cues.   3. Patient will complete convergent naming tasks with 90% acc no cues.   4. Patient will follow functional complex directions with 85% acc given one repetition.  5. Patient will participate in further assessment of mental manipulation, reading, and writing skills.     Speech Therapy Short Term Goals  Goal expected to be met by 12/10  1. Pt will participate in a bedside swallow study to determine the safest and least restrictive possible po diet with possible updated goals to follow pending results. -MET  2. Pt will participate in a speech, language, and cognitive evaluation with possible updated goals to follow pending results. -MET                       Plan:   · Patient to be seen:  3 x/week   · Plan of Care expires:  01/01/19  · Plan of Care reviewed with:  patient   · SLP Follow-Up:  Yes       Discharge recommendations:  Discharge Facility/Level Of Care Needs: home, outpatient speech therapy   Barriers to Discharge:  None    Time Tracking:   SLP Treatment Date:   12/04/18  Speech Start Time:  1113  Speech Stop Time:  1130     Speech Total Time (min):  17  min    Billable Minutes: Eval 17     VENUS Gill, CCC-SLP   Pager: [phone]  12/04/2018

## 2018-12-04 NOTE — PLAN OF CARE
Problem: Patient Care Overview  Goal: Plan of Care Review  Outcome: Ongoing (interventions implemented as appropriate)  Pt involved in plan of care and communicating needs throughout shift.  Up in room with SBA; generalized weakness noted.  No c/o pain or discomfort today.  Tolerating regular diet, voiding without difficulty; had BM today.  Telemetry monitoring maintained; NSR with HR 80s-90s.  2g mag sulfate and a total of 50mEq K-lyte admin as ordered for mg=1.5, K=3.3.  EKG done as ordered; MRI of brain ordered for this afternoon.  All VSS; no acute events so far this shift.  Pt remaining free from falls or injury throughout shift; bed in lowest position; call light within reach.  Pt instructed to call for assistance as needed.  Q1H rounding done on pt.

## 2018-12-04 NOTE — ASSESSMENT & PLAN NOTE
--h/o suicide attempt, recent sw encounter for selfharm behavior  --death of daughter by suicide earlier this year  --seen by heme/onc psych -- reports suicidal ideation but no intent/plan  --requesting psychiatry consult for medication management  --continue home cymbalta, trazodone
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
## 2018-12-04 NOTE — PLAN OF CARE
Problem: Patient Care Overview  Goal: Plan of Care Review  Outcome: Ongoing (interventions implemented as appropriate)  Uneventful shift. Pt has had no c/o pain. IVF infusing. Pt Mg and K replaced this shift. Pt has remained free from injury this shift. Bed in low locked position. Call light and personal belongings within reach. Side rails up x2. Nonskid socks in place. Pt instructed to call with any needs. Will continue to monitor.

## 2018-12-04 NOTE — ASSESSMENT & PLAN NOTE
--Mg .7, Ca2+ 7.0 (corrected - 7.5), K 3.2 upon admission  --replacement ordered by ED physician  --continuing replacement PRN. Twice daily labs.

## 2018-12-04 NOTE — ASSESSMENT & PLAN NOTE
--underwent stroke eval per [facility] neuro: low suspicion for stroke  --episode may be related to seizure: neurology on board, appreciate recs  ----EEG performed yesterday  ----lacosamide 100mg BID  --continuing electrolyte replacement  --s/p speech eval: regular diet and PO meds okay
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
## 2018-12-04 NOTE — PLAN OF CARE
Problem: SLP Goal  Goal: SLP Goal  Speech Therapy Short Term Goals  Goal expected to be met by 12/18  1. Patient will answer functional problem solving questions with 85% acc no cues.   2. Patient will solve simple money/time management problems with 75% acc min cues.   3. Patient will complete convergent naming tasks with 90% acc no cues.   4. Patient will follow functional complex directions with 85% acc given one repetition.  5. Patient will participate in further assessment of mental manipulation, reading, and writing skills.     Speech Therapy Short Term Goals  Goal expected to be met by 12/10  1. Pt will participate in a bedside swallow study to determine the safest and least restrictive possible po diet with possible updated goals to follow pending results. -MET  2. Pt will participate in a speech, language, and cognitive evaluation with possible updated goals to follow pending results. -MET     Outcome: Ongoing (interventions implemented as appropriate)  Nlnxno-Vjsqslau-Gskjnlkae Evaluation completed. Patient presents with cognitive-linguistic impairments. ST will continue to follow.   EDGARDO Morley., CCC-SLP  Pager: [phone]  12/04/2018

## 2018-12-04 NOTE — ASSESSMENT & PLAN NOTE
Patient with long standing depression that is not well controlled on her current medication regimen.  Did report some mild benefits, but is not able to fully articulate them during my assessment.  I reviewed the risks vs benefits of various treatment options and she has elected to proceed with starting Abilify 2 mg to augment her Cymbalta and to address auditory hallucinations.  Could also consider increasing her Cymbalta to 90 mg.  She does not at this time meet legal criteria for an involuntary psychiatric hold and it is recommended that she follow up with an outpatient psychiatrist.    Recommendations:  1) Start Abilify 2 mg daily  2) Continue Cymbalta dose at 60 mg for now.  3) Will order repeat EKG.  4) Continue psychotherapy as outpatient.  5) Follow up with psychiatry as outpatient.

## 2018-12-04 NOTE — ASSESSMENT & PLAN NOTE
Patient found to have difficulty speaking with rigid extremities, clenched hands and UE convulsions upon awakening from nap yesterday at 6 pm. No previous hx of seizures, head trauma or CNS infections.   Of note, she has been sleep deprived with decreased PO intake and N/V over past few days after recent chemo 11/28. Stroke code called in ED and MRI/MRA unremarkable for acute intracranial pathology. She was empirically started on Vimpat 100 mg BID after 200 mg load.     >>Suspect provoked seizure vs. convulsive syncope in setting of hypocalcemia, hypokalemia and hypomagnesemia    12/4-no events overnight, routine EEG was unremarkable for epileptiform discharges    Recommendations:  --please call back if abnormality on repeat MRI brain W WO contrast (epilepsy protocol)  --discontinue Vimpat as it was one event and likely provoked by metabolic deragements  --serum paraneoplastic panel pending
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
## 2018-12-04 NOTE — PROCEDURES
DATE OF PROCEDURE:  12/03/2018    EEG #:  [id number].    REQUESTING PHYSICIAN:  Dr. Lemon.    LOCATION OF SERVICE:  858.    ELECTROENCEPHALOGRAM REPORT     METHODOLOGY:  Electroencephalographic (EEG) recording is recorded with   electrodes placed according to the International 10-20 placement system.  Thirty   two (32) channels of digital signal (sampling rate of 512/sec), including T1   and T2, were simultaneously recorded from the scalp and may include EKG, EMG,   and/or eye monitors.  Recording band pass was 0.1 to 512 Hz.  Digital video   recording of the patient is simultaneously recorded with the EEG.  The patient   is instructed to report clinical symptoms which may occur during the recording   session.  EEG and video recording are stored and archived in digital format.    Activation procedures, which include photic stimulation, hyperventilation and   instructing patients to perform simple tasks, are done in selected patients  The EEG is displayed on a monitor screen and can be reviewed using different   montages.  Computer assisted-analysis is employed to detect spike and   electrographic seizure activity.   The entire record is submitted for computer   analysis.  The entire recording is visually reviewed, and the times identified   by computer analysis as being spikes or seizures are reviewed again.    Compressed spectral analysis (CSA) is also performed on the activity recorded   from each individual channel.  This is displayed as a power display of   frequencies from 0 to 30 Hz over time.   The CSA is reviewed looking for   asymmetries in power between homologous areas of the scalp, then compared with   the original EEG recording.    [employer] software was also utilized in the review of this study.  This software   suite analyzes the EEG recording in multiple domains.  Coherence and rhythmicity   are computed to identify EEG sections which may contain organized seizures.    Each channel undergoes analysis to  detect the presence of spike and sharp waves   which have special and morphological characteristics of epileptic activity.  The   routine EEG recording is converted from special into frequency domain.  This is   then displayed comparing homologous areas to identify areas of significant   asymmetry.  Algorithm to identify non-cortically generated artifact is used to   separate artifact from the EEG.      EEG FINDINGS:  Recording was obtained at the patient's bedside in the hospital   room.  The patient was awake and moving around at that time.  Background in   general was a low-to-midrange beta activity, which was seen diffusely and was   fairly rhythmic.  Occasionally, an 8 to 9 Hz rhythm was seen in the occipital   leads.  Drowsiness or sleep was not recorded.  No lateralized or focal changes   were noted and no spike or sharp wave activity was seen.  The patient was asked   questions and correctly answered the location, the date, the year and the name   of the president.  Hyperventilation and photic stimulation were not carried out.    IMPRESSION:  Normal EEG.    CLINICAL CORRELATION:  The patient is a 53-year-old female with a history of   anxiety, depression, hypertension and prior strokes.  This tracing, however,   does not show evidence of cortical dysfunction.  The prior strokes, most of them   are either small or subcortical in location.  The patient presented with an   aphasia and hypertonic extremities and this recording does not show any evidence   for cortical dysfunction nor an epileptic process.      RR/HN  dd: 12/04/2018 10:28:38 (CST)  td: 12/04/2018 11:00:49 (CST)  Doc ID   #[id number]  Job ID #[id number]    CC:

## 2018-12-04 NOTE — SUBJECTIVE & OBJECTIVE
Subjective:     Interval History:   Routine EEG was unremarkable, awaiting repeat MRI brain W WO contrast   Doing well overnight besides mild headache  Still with numerous electrolyte derangements     Current Neurological Medications:   --Vimpat 100 mg BID    Current Facility-Administered Medications   Medication Dose Route Frequency Provider Last Rate Last Dose    acetaminophen tablet 650 mg  650 mg Oral Q6H PRN Pelon Burden MD        calcium carbonate 200 mg calcium (500 mg) chewable tablet 1,000 mg  1,000 mg Oral TID Pelon Burden MD   1,000 mg at 12/04/18 0901    dextrose 5 % and 0.45 % NaCl with KCl 20 mEq infusion   Intravenous Continuous Pelon Burden  mL/hr at 12/04/18 0545      DULoxetine DR capsule 60 mg  60 mg Oral Daily Pelon Burden MD   60 mg at 12/04/18 0855    gabapentin capsule 300 mg  300 mg Oral BID Pelon Burden MD   300 mg at 12/04/18 0855    HYDROcodone-acetaminophen  mg per tablet 1 tablet  1 tablet Oral Q6H PRN Pelon Burden MD        HYDROcodone-acetaminophen 5-325 mg per tablet 1 tablet  1 tablet Oral Q6H PRN Pelon Burden MD        lacosamide tablet 100 mg  100 mg Oral Q12H Pelon Burden MD   100 mg at 12/04/18 0855    lisinopril tablet 10 mg  10 mg Oral Daily Pelon Burden MD   10 mg at 12/04/18 0855    lorazepam injection 0.5 mg  0.5 mg Intravenous TID PRN Pelon Burden MD        ondansetron injection 8 mg  8 mg Intravenous Q6H PRN Pelon Burden MD        pantoprazole EC tablet 40 mg  40 mg Oral Daily Pelon Burden MD   40 mg at 12/04/18 0855    sodium chloride 0.9% flush 3 mL  3 mL Intravenous PRN Pelon Burden MD        traZODone tablet 50 mg  50 mg Oral QHS Pelon Burden MD   50 mg at 12/03/18 2125     Review of Systems   HENT: Negative for trouble swallowing and voice change.    Gastrointestinal: Negative for nausea and vomiting.   Allergic/Immunologic: Positive for immunocompromised state.   Neurological: Positive for facial asymmetry and  headaches. Negative for dizziness, tremors, seizures, speech difficulty and weakness.   Psychiatric/Behavioral: Negative for confusion and sleep disturbance.     Objective:     Vital Signs (Most Recent):  Temp: 98.6 °F (37 °C) (12/04/18 0854)  Pulse: 97 (12/04/18 0854)  Resp: 17 (12/04/18 0854)  BP: (!) 167/85 (12/04/18 0854)  SpO2: 98 % (12/04/18 0854) Vital Signs (24h Range):  Temp:  [97.5 °F (36.4 °C)-98.6 °F (37 °C)] 98.6 °F (37 °C)  Pulse:  [] 97  Resp:  [17-20] 17  SpO2:  [93 %-98 %] 98 %  BP: (129-167)/(70-85) 167/85     Weight: 100.5 kg (221 lb 9 oz)  Body mass index is 33.69 kg/m².    Physical Exam   Eyes: EOM are normal.   Neurological: She has a normal Finger-Nose-Finger Test.     NEUROLOGICAL EXAMINATION:     MENTAL STATUS   Oriented to place.   Oriented to year and month.   Follows 2 step commands.   Attention: normal. Concentration: normal.   Level of consciousness: alert  Knowledge: good.   Normal comprehension.     CRANIAL NERVES     CN III, IV, VI   Extraocular motions are normal.   Nystagmus: none   Ophthalmoparesis: none    CN VII   Left facial weakness: peripheral    CN VIII   Hearing: intact    CN IX, X   Palate: symmetric    CN XII   CN XII normal.     MOTOR EXAM   Muscle bulk: normal  Overall muscle tone: normal  Right arm pronator drift: absent  Left arm pronator drift: absent       Moving all extremities equally and antigravity      GAIT AND COORDINATION     Gait  Gait: (deferred)     Coordination   Finger to nose coordination: normal    Tremor   Resting tremor: absent    Significant Labs:   Blood Culture: No results for input(s): LABBLOO in the last 48 hours.  CBC:   Recent Labs   Lab 12/02/18 1926 12/02/18  1934 12/03/18  0614 12/04/18  0540   WBC 5.75  --  4.81 3.31*   HGB 11.5*  --  11.0* 10.5*   HCT 35.0* 35* 32.7* 32.8*   *  --  123* 128*     CMP:   Recent Labs   Lab 12/02/18 1926 12/03/18  0613 12/03/18  1635 12/04/18  0540   * 128* 162* 113*    138 137 138    K 3.2* 3.3* 3.1* 3.3*   CL 96 99 99 103   CO2 24 28 27 27   BUN 19 15 12 9   CREATININE 1.2 1.0 1.1 0.8   CALCIUM 7.0* 7.0* 7.5* 7.3*   MG 0.7* 0.9* 1.1* 1.5*   PROT 6.9 6.5  --  6.2   ALBUMIN 3.4* 3.1*  --  3.0*   BILITOT 0.6 0.6  --  0.4   ALKPHOS 120 112  --  118   AST 31 28  --  24   ALT 54* 51*  --  50*   ANIONGAP 17* 11 11 8   EGFRNONAA 51.0* >60.0 56.7* >60.0     Inflammatory Markers: No results for input(s): SEDRATE, CRP, PROCAL in the last 48 hours.  Urine Culture: No results for input(s): LABURIN in the last 48 hours.  Urine Studies: No results for input(s): COLORU, APPEARANCEUA, PHUR, SPECGRAV, PROTEINUA, GLUCUA, KETONESU, BILIRUBINUA, OCCULTUA, NITRITE, UROBILINOGEN, LEUKOCYTESUR, RBCUA, WBCUA, BACTERIA, SQUAMEPITHEL, HYALINECASTS in the last 48 hours.    Invalid input(s): WRIGHTSUR  All pertinent lab results from the past 24 hours have been reviewed.    Routine EEG (12/3/18):  Normal, no evidence of cortical dysfunction or epileptiform discharges    Significant Imaging: I have reviewed and interpreted all pertinent imaging results/findings within the past 24 hours.     MRI/MRA Brain WO contrast (12/2/18):  No evidence of diffusion restriction suggest acute infarction.Unremarkable MRA of the intracranial circulation.

## 2018-12-04 NOTE — PROGRESS NOTES
[facility]  Gynecologic Oncology  Progress Note      Patient Name: Edita Riley  MRN: [id number]  Admission Date: 12/2/2018  Hospital Length of Stay: 2 days  Attending Provider: Refugio Lemon MD  Primary Care Provider: Hammad Lackey MD  Principal Problem: Seizure-like activity    Follow-up For: * No surgery found *  Post-Operative Day:    Subjective:      History of Present Illness:  Pt is a 53 year old female with metastatic cervical cancer who presented to the ED with aphasia and hypertonic extremities. Per patient history, she was sitting at home and had sudden onset fist clenching/muscle spasms and was unable to speak. Says the symptoms are starting to improve but still present - speech continues to be slurred. In ED, stroke code was called - imaging showed no evidence of stroke. Labs did show low magnesium, calcium, and potassium, however. Gyn Onc was consulted for further management. Pt reports that nothing like this has happened to her before. Is on chemotherapy and radiation for her recurrent cancer. (She is s/p surgery in 2015.) S/p 4 cycles of cisplatin (last 11/28.) Patient has experienced vomiting for the past few days. Has not been able to eat/drink much. Mild abdominal pain. No vaginal bleeding. Should be noted that patient was referred to social work last week for concerns of self-harm behavior.     Hospital Course:  12/02/2018 Admitted to gyn onc service for seizure-like activity. [facility] neuro consult ruled out stroke. Gen neuro consult placed. Started on lacosamide per [facility] neuro recs. Correcting electrolyte abnormalities.   12/03/2018 Pt reports some improvement in symptoms but continued difficulty with speech and hypertonia. On tele monitoring. Was evaluated by neuro and heme/onc psych. Desires psychiatry consult to discuss medications. EEG performed, awaiting results.  12/04/2018 TONIO. Continuing to replace electrolytes PRN.    Interval History: Pt reporting some  improvement in symptoms. Was evaluated by neuro and heme/onc psych yesterday. Requests psychiatry consult for med management for depression. Denies pain, nausea, vomiting.     Scheduled Meds:   DULoxetine  60 mg Oral Daily    gabapentin  300 mg Oral BID    lacosamide  100 mg Oral Q12H    lisinopril  10 mg Oral Daily    pantoprazole  40 mg Oral Daily    traZODone  50 mg Oral QHS     Continuous Infusions:   dextrose 5 % and 0.45 % NaCl with KCl 20 mEq 125 mL/hr at 12/03/18 0835     PRN Meds:acetaminophen, HYDROcodone-acetaminophen, HYDROcodone-acetaminophen, lorazepam, ondansetron, sodium chloride 0.9%    Review of patient's allergies indicates:   Allergen Reactions    Bee sting [allergen ext-venom-honey bee]      Rash      Grass pollen-bermuda, standard      rash       Objective:     Vital Signs (Most Recent):  Temp: 97.5 °F (36.4 °C) (12/04/18 0412)  Pulse: 90 (12/04/18 0412)  Resp: 18 (12/04/18 0412)  BP: 129/78 (12/04/18 0412)  SpO2: 96 % (12/04/18 0412) Vital Signs (24h Range):  Temp:  [97.5 °F (36.4 °C)-98.2 °F (36.8 °C)] 97.5 °F (36.4 °C)  Pulse:  [] 90  Resp:  [18-20] 18  SpO2:  [93 %-99 %] 96 %  BP: (129-150)/(70-82) 129/78     Weight: 100.5 kg (221 lb 9 oz)  Body mass index is 33.69 kg/m².    Intake/Output - Last 3 Shifts       12/02 0700 - 12/03 0659 12/03 0700 - 12/04 0659    P.O.  480    I.V. (mL/kg) 585.4 (5.8) 3175 (31.6)    IV Piggyback  500    Total Intake(mL/kg) 585.4 (5.8) 4155 (41.3)    Urine (mL/kg/hr)  750 (0.3)    Stool  0    Total Output  750    Net +585.4 +3405          Urine Occurrence 1 x 1 x    Stool Occurrence  1 x             Physical Exam:   Constitutional: She is oriented to person, place, and time. She appears well-developed and well-nourished. No distress.    HENT:   Head: Normocephalic and atraumatic.       Pulmonary/Chest: Effort normal. No respiratory distress.        Abdominal: Soft. She exhibits no distension. There is no tenderness. There is no rebound and no  guarding.             Musculoskeletal: Moves all extremeties.       Neurological: She is alert and oriented to person, place, and time.   Tone now normal in upper extremities. Possible increased tone in lower extremities but appears to be related to patient resisting exam.     Skin: Skin is warm and dry. She is not diaphoretic.    Psychiatric:   Flat affect       Lines/Drains/Airways     Peripheral Intravenous Line                 Peripheral IV - Single Lumen 12/02/18 1920 Left Forearm 1 day         Peripheral IV - Single Lumen 12/02/18 2107 Right Hand 1 day                Laboratory:  BMP:   Recent Labs   Lab 12/02/18 1926 12/03/18  0613 12/03/18  1635   * 128* 162*    138 137   K 3.2* 3.3* 3.1*   CL 96 99 99   CO2 24 28 27   BUN 19 15 12   CREATININE 1.2 1.0 1.1   CALCIUM 7.0* 7.0* 7.5*   MG 0.7* 0.9* 1.1*    and CBC:   Recent Labs   Lab 12/02/18 1926 12/02/18  1934 12/03/18  0614 12/04/18  0540   WBC 5.75  --  4.81 3.31*   HGB 11.5*  --  11.0* 10.5*   HCT 35.0* 35* 32.7* 32.8*   *  --  123* 128*     Mag: .7 > .9 > 1.1  Awaiting am labs    Diagnostic Results:  EEG pending    Assessment/Plan:     * Seizure-like activity    --underwent stroke eval per [facility] neuro: low suspicion for stroke  --episode may be related to seizure: neurology on board, appreciate recs  ----EEG performed yesterday  ----lacosamide 100mg BID  --continuing electrolyte replacement  --s/p speech eval: regular diet and PO meds okay     Electrolyte disorder    --Mg .7, Ca2+ 7.0 (corrected - 7.5), K 3.2 upon admission  --replacement ordered by ED physician  --continuing replacement PRN. Twice daily labs.     Metastatic squamous cell carcinoma to lymph node    --h/o cervical cancer s/p surgery in 2015  --now on cisplatin (s/p 4 cycles, last 11/28) and radiation (M-F)  --no evidence of brain mets on imaging, consider MRI w contrast to confirm     Gastroesophageal reflux disease with esophagitis    --PO protonix daily     Severe  episode of recurrent major depressive disorder, with psychotic features    --h/o suicide attempt, recent sw encounter for selfharm behavior  --death of daughter by suicide earlier this year  --seen by heme/onc psych -- reports suicidal ideation but no intent/plan  --requesting psychiatry consult for medication management  --continue home cymbalta, trazodone      Essential hypertension    --BP: (129-150)/(70-82) 129/78  --continue home lisinopril         VTE Risk Mitigation (From admission, onward)        Ordered     Place sequential compression device  Until discontinued      12/02/18 2111     IP VTE HIGH RISK PATIENT  Once      12/02/18 2111          Was nino catheter removed? N/a no nino in place    DISPO: continue inpatient care. Electrolyte replacement. Psychiatry consult.     Pelon Burden MD  Gynecologic Oncology  [facility]

## 2018-12-04 NOTE — PROGRESS NOTES
[facility]  Neurology  Progress Note    Patient Name: Edita Riley  MRN: [id number]  Admission Date: 12/2/2018  Hospital Length of Stay: 2 days  Code Status: Full Code   Attending Provider: Refugio Lemon MD  Primary Care Physician: Hammad Lackey MD   Principal Problem:Seizure-like activity      Subjective:     Interval History:   Routine EEG was unremarkable, awaiting repeat MRI brain W WO contrast   Doing well overnight besides mild headache  Still with numerous electrolyte derangements     Current Neurological Medications:   --Vimpat 100 mg BID    Current Facility-Administered Medications   Medication Dose Route Frequency Provider Last Rate Last Dose    acetaminophen tablet 650 mg  650 mg Oral Q6H PRN Pelon Burden MD        calcium carbonate 200 mg calcium (500 mg) chewable tablet 1,000 mg  1,000 mg Oral TID Pelon Burden MD   1,000 mg at 12/04/18 0901    dextrose 5 % and 0.45 % NaCl with KCl 20 mEq infusion   Intravenous Continuous Pelon Burden  mL/hr at 12/04/18 0545      DULoxetine DR capsule 60 mg  60 mg Oral Daily Pelon Burden MD   60 mg at 12/04/18 0855    gabapentin capsule 300 mg  300 mg Oral BID Pelon Burden MD   300 mg at 12/04/18 0855    HYDROcodone-acetaminophen  mg per tablet 1 tablet  1 tablet Oral Q6H PRN Pelon Burden MD        HYDROcodone-acetaminophen 5-325 mg per tablet 1 tablet  1 tablet Oral Q6H PRN Pelon Burden MD        lacosamide tablet 100 mg  100 mg Oral Q12H Pelon Burden MD   100 mg at 12/04/18 0855    lisinopril tablet 10 mg  10 mg Oral Daily Pelon Burden MD   10 mg at 12/04/18 0855    lorazepam injection 0.5 mg  0.5 mg Intravenous TID PRN Pelon Burden MD        ondansetron injection 8 mg  8 mg Intravenous Q6H PRN Pelon Burden MD        pantoprazole EC tablet 40 mg  40 mg Oral Daily Pelon Burden MD   40 mg at 12/04/18 0855    sodium chloride 0.9% flush 3 mL  3 mL Intravenous PRN Pelon Burden MD         traZODone tablet 50 mg  50 mg Oral QHS Pelon Burden MD   50 mg at 12/03/18 2125     Review of Systems   HENT: Negative for trouble swallowing and voice change.    Gastrointestinal: Negative for nausea and vomiting.   Allergic/Immunologic: Positive for immunocompromised state.   Neurological: Positive for facial asymmetry and headaches. Negative for dizziness, tremors, seizures, speech difficulty and weakness.   Psychiatric/Behavioral: Negative for confusion and sleep disturbance.     Objective:     Vital Signs (Most Recent):  Temp: 98.6 °F (37 °C) (12/04/18 0854)  Pulse: 97 (12/04/18 0854)  Resp: 17 (12/04/18 0854)  BP: (!) 167/85 (12/04/18 0854)  SpO2: 98 % (12/04/18 0854) Vital Signs (24h Range):  Temp:  [97.5 °F (36.4 °C)-98.6 °F (37 °C)] 98.6 °F (37 °C)  Pulse:  [] 97  Resp:  [17-20] 17  SpO2:  [93 %-98 %] 98 %  BP: (129-167)/(70-85) 167/85     Weight: 100.5 kg (221 lb 9 oz)  Body mass index is 33.69 kg/m².    Physical Exam   Eyes: EOM are normal.   Neurological: She has a normal Finger-Nose-Finger Test.     NEUROLOGICAL EXAMINATION:     MENTAL STATUS   Oriented to place.   Oriented to year and month.   Follows 2 step commands.   Attention: normal. Concentration: normal.   Level of consciousness: alert  Knowledge: good.   Normal comprehension.     CRANIAL NERVES     CN III, IV, VI   Extraocular motions are normal.   Nystagmus: none   Ophthalmoparesis: none    CN VII   Left facial weakness: peripheral    CN VIII   Hearing: intact    CN IX, X   Palate: symmetric    CN XII   CN XII normal.     MOTOR EXAM   Muscle bulk: normal  Overall muscle tone: normal  Right arm pronator drift: absent  Left arm pronator drift: absent       Moving all extremities equally and antigravity      GAIT AND COORDINATION     Gait  Gait: (deferred)     Coordination   Finger to nose coordination: normal    Tremor   Resting tremor: absent    Significant Labs:   Blood Culture: No results for input(s): LABBLOO in the last 48  hours.  CBC:   Recent Labs   Lab 12/02/18  1926 12/02/18  1934 12/03/18  0614 12/04/18  0540   WBC 5.75  --  4.81 3.31*   HGB 11.5*  --  11.0* 10.5*   HCT 35.0* 35* 32.7* 32.8*   *  --  123* 128*     CMP:   Recent Labs   Lab 12/02/18  1926 12/03/18  0613 12/03/18  1635 12/04/18  0540   * 128* 162* 113*    138 137 138   K 3.2* 3.3* 3.1* 3.3*   CL 96 99 99 103   CO2 24 28 27 27   BUN 19 15 12 9   CREATININE 1.2 1.0 1.1 0.8   CALCIUM 7.0* 7.0* 7.5* 7.3*   MG 0.7* 0.9* 1.1* 1.5*   PROT 6.9 6.5  --  6.2   ALBUMIN 3.4* 3.1*  --  3.0*   BILITOT 0.6 0.6  --  0.4   ALKPHOS 120 112  --  118   AST 31 28  --  24   ALT 54* 51*  --  50*   ANIONGAP 17* 11 11 8   EGFRNONAA 51.0* >60.0 56.7* >60.0     Inflammatory Markers: No results for input(s): SEDRATE, CRP, PROCAL in the last 48 hours.  Urine Culture: No results for input(s): LABURIN in the last 48 hours.  Urine Studies: No results for input(s): COLORU, APPEARANCEUA, PHUR, SPECGRAV, PROTEINUA, GLUCUA, KETONESU, BILIRUBINUA, OCCULTUA, NITRITE, UROBILINOGEN, LEUKOCYTESUR, RBCUA, WBCUA, BACTERIA, SQUAMEPITHEL, HYALINECASTS in the last 48 hours.    Invalid input(s): WRIGHTSUR  All pertinent lab results from the past 24 hours have been reviewed.    Routine EEG (12/3/18):  Normal, no evidence of cortical dysfunction or epileptiform discharges    Significant Imaging: I have reviewed and interpreted all pertinent imaging results/findings within the past 24 hours.     MRI/MRA Brain WO contrast (12/2/18):  No evidence of diffusion restriction suggest acute infarction.Unremarkable MRA of the intracranial circulation.    Assessment and Plan:     * Seizure-like activity    Patient found to have difficulty speaking with rigid extremities, clenched hands and UE convulsions upon awakening from nap yesterday at 6 pm. No previous hx of seizures, head trauma or CNS infections.   Of note, she has been sleep deprived with decreased PO intake and N/V over past few days after  recent chemo 11/28. Stroke code called in ED and MRI/MRA unremarkable for acute intracranial pathology. She was empirically started on Vimpat 100 mg BID after 200 mg load.     >>Suspect provoked seizure vs. convulsive syncope in setting of hypocalcemia, hypokalemia and hypomagnesemia    12/4-no events overnight, routine EEG was unremarkable for epileptiform discharges    Recommendations:  --please call back if abnormality on repeat MRI brain W WO contrast (epilepsy protocol)  --discontinue Vimpat as it was one event and likely provoked by metabolic deragements  --serum paraneoplastic panel pending  --f/u with Dr. Villarreal in outpatient clinic in 4-6 weeks   Electrolyte disorder    Hypomagnesemia, hypokalemia and hypocalcemia on admit  Provoking factors for seizure  --management per primary team   Metastatic squamous cell carcinoma to lymph node    Hx of cervical CA (2015) and now on chemoradiation (radiation M-F) and cisplatin weekly, last given 11/28  --management per GYN ONC     VTE Risk Mitigation (From admission, onward)        Ordered     Place sequential compression device  Until discontinued      12/02/18 2111     IP VTE HIGH RISK PATIENT  Once      12/02/18 2111        Neurology to sign off. Please call back with any questions or clarifications    Kandice Christianson PA-C  General Neurology Consult  Neuro Consult [employer] # [id number]

## 2018-12-05 ENCOUNTER — TELEPHONE (OUTPATIENT)
Dept: NEUROLOGY | Facility: CLINIC | Age: 53
End: 2018-12-05

## 2018-12-05 LAB
ALBUMIN SERPL BCP-MCNC: 2.9 G/DL
ALP SERPL-CCNC: 131 U/L
ALT SERPL W/O P-5'-P-CCNC: 47 U/L
ANION GAP SERPL CALC-SCNC: 7 MMOL/L
ANION GAP SERPL CALC-SCNC: 8 MMOL/L
AST SERPL-CCNC: 22 U/L
BASOPHILS # BLD AUTO: 0.01 K/UL
BASOPHILS NFR BLD: 0.3 %
BILIRUB SERPL-MCNC: 0.3 MG/DL
BUN SERPL-MCNC: 6 MG/DL
BUN SERPL-MCNC: 7 MG/DL
CALCIUM SERPL-MCNC: 8 MG/DL
CALCIUM SERPL-MCNC: 8.5 MG/DL
CHLORIDE SERPL-SCNC: 103 MMOL/L
CHLORIDE SERPL-SCNC: 99 MMOL/L
CO2 SERPL-SCNC: 29 MMOL/L
CO2 SERPL-SCNC: 32 MMOL/L
CREAT SERPL-MCNC: 0.9 MG/DL
CREAT SERPL-MCNC: 1 MG/DL
DIFFERENTIAL METHOD: ABNORMAL
EOSINOPHIL # BLD AUTO: 0 K/UL
EOSINOPHIL NFR BLD: 0.9 %
ERYTHROCYTE [DISTWIDTH] IN BLOOD BY AUTOMATED COUNT: 14.2 %
EST. GFR  (AFRICAN AMERICAN): >60 ML/MIN/1.73 M^2
EST. GFR  (AFRICAN AMERICAN): >60 ML/MIN/1.73 M^2
EST. GFR  (NON AFRICAN AMERICAN): >60 ML/MIN/1.73 M^2
EST. GFR  (NON AFRICAN AMERICAN): >60 ML/MIN/1.73 M^2
GLUCOSE SERPL-MCNC: 132 MG/DL
GLUCOSE SERPL-MCNC: 137 MG/DL
HCT VFR BLD AUTO: 31.8 %
HGB BLD-MCNC: 10.5 G/DL
IMM GRANULOCYTES # BLD AUTO: 0.02 K/UL
IMM GRANULOCYTES NFR BLD AUTO: 0.6 %
LYMPHOCYTES # BLD AUTO: 0.3 K/UL
LYMPHOCYTES NFR BLD: 8.6 %
MAGNESIUM SERPL-MCNC: 1 MG/DL
MAGNESIUM SERPL-MCNC: 1.5 MG/DL
MCH RBC QN AUTO: 28.1 PG
MCHC RBC AUTO-ENTMCNC: 33 G/DL
MCV RBC AUTO: 85 FL
MONOCYTES # BLD AUTO: 0.5 K/UL
MONOCYTES NFR BLD: 15.4 %
NEUTROPHILS # BLD AUTO: 2.4 K/UL
NEUTROPHILS NFR BLD: 74.2 %
NRBC BLD-RTO: 0 /100 WBC
PHOSPHATE SERPL-MCNC: 2.9 MG/DL
PLATELET # BLD AUTO: 118 K/UL
PMV BLD AUTO: 9.8 FL
POTASSIUM SERPL-SCNC: 3.7 MMOL/L
POTASSIUM SERPL-SCNC: 3.8 MMOL/L
PROT SERPL-MCNC: 6.3 G/DL
RBC # BLD AUTO: 3.74 M/UL
SODIUM SERPL-SCNC: 138 MMOL/L
SODIUM SERPL-SCNC: 140 MMOL/L
WBC # BLD AUTO: 3.25 K/UL

## 2018-12-05 PROCEDURE — 77387 GUIDANCE FOR RADJ TX DLVR: CPT | Mod: TC | Performed by: RADIOLOGY

## 2018-12-05 PROCEDURE — 80048 BASIC METABOLIC PNL TOTAL CA: CPT

## 2018-12-05 PROCEDURE — 77387 GUIDANCE FOR RADJ TX DLVR: CPT | Mod: ,,, | Performed by: RADIOLOGY

## 2018-12-05 PROCEDURE — 85025 COMPLETE CBC W/AUTO DIFF WBC: CPT

## 2018-12-05 PROCEDURE — 97165 OT EVAL LOW COMPLEX 30 MIN: CPT

## 2018-12-05 PROCEDURE — 84100 ASSAY OF PHOSPHORUS: CPT

## 2018-12-05 PROCEDURE — 99231 SBSQ HOSP IP/OBS SF/LOW 25: CPT | Mod: ,,, | Performed by: OBSTETRICS & GYNECOLOGY

## 2018-12-05 PROCEDURE — 97161 PT EVAL LOW COMPLEX 20 MIN: CPT

## 2018-12-05 PROCEDURE — 20600001 HC STEP DOWN PRIVATE ROOM

## 2018-12-05 PROCEDURE — 25000003 PHARM REV CODE 250: Performed by: STUDENT IN AN ORGANIZED HEALTH CARE EDUCATION/TRAINING PROGRAM

## 2018-12-05 PROCEDURE — 36415 COLL VENOUS BLD VENIPUNCTURE: CPT

## 2018-12-05 PROCEDURE — 83735 ASSAY OF MAGNESIUM: CPT

## 2018-12-05 PROCEDURE — 77386 HC IMRT, COMPLEX: CPT | Performed by: RADIOLOGY

## 2018-12-05 PROCEDURE — 63600175 PHARM REV CODE 636 W HCPCS: Performed by: STUDENT IN AN ORGANIZED HEALTH CARE EDUCATION/TRAINING PROGRAM

## 2018-12-05 PROCEDURE — 80053 COMPREHEN METABOLIC PANEL: CPT

## 2018-12-05 PROCEDURE — 92507 TX SP LANG VOICE COMM INDIV: CPT

## 2018-12-05 RX ORDER — LANOLIN ALCOHOL/MO/W.PET/CERES
400 CREAM (GRAM) TOPICAL 2 TIMES DAILY
Status: DISCONTINUED | OUTPATIENT
Start: 2018-12-05 | End: 2018-12-06 | Stop reason: HOSPADM

## 2018-12-05 RX ORDER — ARIPIPRAZOLE 2 MG/1
2 TABLET ORAL DAILY
Status: DISCONTINUED | OUTPATIENT
Start: 2018-12-05 | End: 2018-12-06 | Stop reason: HOSPADM

## 2018-12-05 RX ORDER — MAGNESIUM SULFATE HEPTAHYDRATE 40 MG/ML
2 INJECTION, SOLUTION INTRAVENOUS ONCE
Status: COMPLETED | OUTPATIENT
Start: 2018-12-05 | End: 2018-12-05

## 2018-12-05 RX ADMIN — GABAPENTIN 300 MG: 300 CAPSULE ORAL at 08:12

## 2018-12-05 RX ADMIN — MAGNESIUM SULFATE IN WATER 2 G: 40 INJECTION, SOLUTION INTRAVENOUS at 09:12

## 2018-12-05 RX ADMIN — DEXTROSE MONOHYDRATE, SODIUM CHLORIDE, AND POTASSIUM CHLORIDE: 50; 4.5; 1.49 INJECTION, SOLUTION INTRAVENOUS at 11:12

## 2018-12-05 RX ADMIN — ARIPIPRAZOLE 2 MG: 2 TABLET ORAL at 08:12

## 2018-12-05 RX ADMIN — DULOXETINE 60 MG: 60 CAPSULE, DELAYED RELEASE ORAL at 08:12

## 2018-12-05 RX ADMIN — DEXTROSE MONOHYDRATE, SODIUM CHLORIDE, AND POTASSIUM CHLORIDE: 50; 4.5; 1.49 INJECTION, SOLUTION INTRAVENOUS at 03:12

## 2018-12-05 RX ADMIN — MAGNESIUM OXIDE TAB 400 MG (241.3 MG ELEMENTAL MG) 400 MG: 400 (241.3 MG) TAB at 08:12

## 2018-12-05 RX ADMIN — TRAZODONE HYDROCHLORIDE 50 MG: 50 TABLET ORAL at 08:12

## 2018-12-05 RX ADMIN — LISINOPRIL 10 MG: 10 TABLET ORAL at 08:12

## 2018-12-05 RX ADMIN — MAGNESIUM OXIDE TAB 400 MG (241.3 MG ELEMENTAL MG) 400 MG: 400 (241.3 MG) TAB at 09:12

## 2018-12-05 RX ADMIN — DEXTROSE MONOHYDRATE, SODIUM CHLORIDE, AND POTASSIUM CHLORIDE: 50; 4.5; 1.49 INJECTION, SOLUTION INTRAVENOUS at 07:12

## 2018-12-05 RX ADMIN — PANTOPRAZOLE SODIUM 40 MG: 40 TABLET, DELAYED RELEASE ORAL at 08:12

## 2018-12-05 RX ADMIN — MAGNESIUM SULFATE HEPTAHYDRATE 1 G: 500 INJECTION, SOLUTION INTRAMUSCULAR; INTRAVENOUS at 07:12

## 2018-12-05 NOTE — HOSPITAL COURSE
"12/05/2018  Chart reviewed. Upon initiation of interview, pt was lying in bed, dressed in hospital gown. No distress noted, patient agreeable and cooperative with interview. No acute events overnight. Patient states she is feeling "ok" this morning. Patient reports sleeping "well," and has a "fair" appetite. No somatic complaints. Patient has been compliant with medications.Tolerating medications without adverse side effects. Denies SI, HI, AVH, delusions, or paranoia. No psychiatric PRNs required.  Will receive first dose of Abilify this AM.     12/06/2018  Chart reviewed. Upon initiation of interview, pt was lying in bed, dressed in hospital gown. No distress noted, patient agreeable and cooperative with interview. No acute events overnight. Patient states she is feeling "better" this morning. Patient reports sleeping "well," and has a "fair" appetite. No somatic complaints. Patient has been compliant with medications.Tolerating medications without adverse side effects. Denies SI, HI, AVH, delusions, or paranoia. No psychiatric PRNs required.  She has tolerated the initiation of Abilify well.  Discussed with her that it will take time for the augmentation to take full effect, but that she should begin to notice an improvement in her mood over the next several weeks.  Encouraged her to follow up with outpatient psychiatry and gave resources for [facility].  She expressed understanding of the importance of this for further monitoring of her response and future titration of medications.    "

## 2018-12-05 NOTE — PT/OT/SLP EVAL
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, wife, homemaker  Equipment Used at Home:  none  Assistance upon Discharge: yes    Past Medical History:   Diagnosis Date    Anxiety     Cervical cancer 2014    Chronic back pain     Depression     Diarrhea due to malabsorption 11/14/2018    Fibromyalgia     GERD (gastroesophageal reflux disease)     Hiatal hernia 2014    History of cervical cancer 10/11/2018    Hx of psychiatric care     Cymbalta, trazodone    Hypertension     Hypomagnesemia 11/21/2018    Lactose intolerance     Metastatic squamous cell carcinoma to lymph node 10/11/2018    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 drug 11/14/2018    Osteoarthritis of back     Psychiatric problem     Stroke 1972       Past Surgical History:   Procedure Laterality Date    BILATERAL OOPHORECTOMY  2015    CHOLECYSTECTOMY  11/09/2016    Done at [facility], path showed chronic cholecystitis and gallstones    CHOLECYSTECTOMY-LAPAROSCOPIC N/A 11/9/2016    Performed by Joshua Goldberg, MD at [facility] OR 2ND FLR    cold knife conization  2014    COLONOSCOPY  2014    COLONOSCOPY N/A 10/24/2016    at [facility], Dr Thomas    COLONOSCOPY N/A 5/18/2018    Procedure: COLONOSCOPY;  Surgeon: Arden Gutiérrez MD;  Location: [facility] ([address] [place] [address]);  Service: Endoscopy;  Laterality: N/A;    COLONOSCOPY N/A 5/18/2018    Performed by Arden Gutiérrez MD at [facility] (4TH FLR)    COLONOSCOPY N/A 10/24/2016    Performed by Arden Gutiérrez MD at [facility] (4TH FLR)    ESOPHAGOGASTRODUODENOSCOPY  2014     "ESOPHAGOGASTRODUODENOSCOPY (EGD) N/A 10/24/2016    Performed by Arden Gutiérrez MD at [facility] ENDO (4TH FLR)    HYSTERECTOMY  2014    TVH for cervical cancer    LYMPHADENECTOMY, RETROPERITONEUM Right 9/17/2018    Performed by Sebas Reed MD at [facility] OR 2ND FLR    OOPHORECTOMY      RETROPERITONEAL LYMPHADENECTOMY Right 9/17/2018    Procedure: LYMPHADENECTOMY, RETROPERITONEUM;  Surgeon: Sebas Reed MD;  Location: [facility] OR 2ND FLR;  Service: General;  Laterality: Right;  ILIAC       Subjective     Chief Complaint: says she feels better  Patient/Family Comments/goals: "thank you"    Pain/Comfort:  · Pain Rating 1: 8/10(legs)  · Pain Addressed 1: Reposition, Distraction  · Pain Rating Post-Intervention 1: 8/10    Patients cultural, spiritual, Yarsani conflicts given the current situation: none    Objective:     Communicated with: nsg and PT prior to session.  Patient found with: all lines intact and telemetry, peripheral IV upon OT entry to room.    General Precautions: Standard, fall   Orthopedic Precautions:    Braces:       Occupational Performance:    Bed Mobility:    · Spvn    Functional Mobility/Transfers:  · T/f SBA  · Functional Mobility: ambulated CGA x community distance without AD    Activities of Daily Living:  · UE dress with gown with min assist  · Feeding independent  · Min assist LE dress    Cognitive/Visual Perceptual:  Follows basic commands, oriented x 3 and to situation    Physical Exam:  5/5 BUE strength, intact sensation, grossly intact FM/GM coordination    AMPAC 6 Click ADL:  AMPAC Total Score: 20    Treatment & Education:  -Pt edu on OT role/POC  -Importance of OOB activity with staff assistance  -Safety during functional t/f and mobility   - Multiple self care tasks completed-- assistance level noted above  - All questions/concerns answered within OT scope of practice           Education:    Patient left up in chair with all lines intact and call button in reach    Assessment: " "    Edita [facility] is a 53 y.o. female with a medical diagnosis of Seizure-like activity.  She presents with the following performance deficits affecting function: weakness, impaired endurance, impaired self care skills, impaired functional mobilty.      Rehab Prognosis: Good; patient would benefit from acute skilled OT services to address these deficits and reach maximum level of function.         Clinical Decision Makin.  OT Low:  "Pt evaluation falls under low complexity for evaluation coding due to performance deficits noted in 1-3 areas as stated above and 0 co-morbities affecting current functional status. Data obtained from problem focused assessments. No modifications or assistance was required for completion of evaluation. Only brief occupational profile and history review completed."     Plan:     Patient to be seen 3 x/week to address the above listed problems via self-care/home management, therapeutic activities, therapeutic exercises  · Plan of Care Expires: 19  · Plan of Care Reviewed with: patient    This Plan of care has been discussed with the patient who was involved in its development and understands and is in agreement with the identified goals and treatment plan    GOALS:   Multidisciplinary Problems     Occupational Therapy Goals        Problem: Occupational Therapy Goal    Goal Priority Disciplines Outcome Interventions   Occupational Therapy Goal     OT, PT/OT Ongoing (interventions implemented as appropriate)    Description:  Goals to be met by: 18     Patient will increase functional independence with ADLs by performing:    UE Dressing with Supervision.  LE Dressing with Supervision.  Grooming while standing at sink with Supervision.  Toileting from toilet with Supervision for hygiene and clothing management.   Toilet transfer to toilet with Supervision.                      Time Tracking:     OT Date of Treatment: 18  OT Start Time: 1249  OT Stop Time: " 1306  OT Total Time (min): 17 min    Billable Minutes:Evaluation 17 min    Cassidy Sampson OT  12/5/2018

## 2018-12-05 NOTE — PLAN OF CARE
Per MD request- CM requested follow up appointments for patient with outpatient speech therapy, Neurology and Psychiatry. Appt with Neurology pending. Psychiatry follow up scheduled for 12/13/18. Internal outpatient ambulatory referral sent for speech therapy.    Future Appointments   Date Time Provider Department Center   12/13/2018  9:00 AM Enoc Martinez, PhD [facility] KALYAN Fernando   1/14/2019 10:15 AM Audrey Mustafa MD [facility] Ralph Grafy W     Parul Talbert RN, CM  [facility]  [phone]

## 2018-12-05 NOTE — PROGRESS NOTES
Admit Assessment    Patient Identification  Edita Riley   :  1965  Admit Date:  2018  Attending Provider:  Refugio Lemon MD              Referral:   Pt was admitted to  with a diagnosis of Seizure-like activity, and was admitted this hospital stay due to Hypocalcemia [E83.51]  Hypokalemia [E87.6]  Hypomagnesemia [E83.42]  Stroke [I63.9].   is involved was referred to the Social Work Department via routine referral.  Patient presents as a 53 y.o. year old  female.    Persons interviewed: patient and spouse via phone (Hammad)    Living Situation:  Pt. Resides at home with her spouse and 19 y.o. Daughter. Pt. States that she has been independent with all adl's prior to admission. Pt. Reports that her  ([phone]) is her emergency contact and that he helps with needs as necessary.     Resides at [address] [place] [address], phone: [phone] (home).      Functional Status Prior  Ambulation: 2-->assistive person  Transferrin-->assistive person  Toiletin-->assistive person    Current or Past Agencies and Description of Services/Supplies    DME  Agency Name: none  Agency Phone Number: n/a       Home Health  Agency Name: none  Agency Phone Number: n/a  Services: n/a    IV Infusion  Agency Name: none  Agency Phone Number: n/a    Nutrition: oral    Outpatient Pharmacy:     [facility] #[id number] - [place], [place] - [address].  [address] [place] [address]  Phone: [phone] Fax: [phone]      Patient Preference of agencies include: none noted    Patient/Caregiver informed of right to choose providers or agencies.  Patient provides permission to release any necessary information to [facility] and to Non[facility] agencies as needed to facilitate patient care, treatment planning, and patient discharge planning.  Written and verbal resources provided.      Coping: pt. States that she is  having a difficult time due to recent death of her daughter. Daughter was a identical twin and pt. Says that she and her  are having to help living daughter cope with the loss of her sister and pt. States this has been a difficult time for the entire family. Pt. Also states that she has having to manage her recurrence of her cancer and this has also added a great deal of stress to her. Pt. Interested in meeting with psychologist (Dr. Martinez). Informed Dr. Lemon of the above so that referral can be made.           Adjustment to Diagnosis and Treatment: having a difficult time      Emotional/Behavioral/Cognitive Issues: none noted            History/Current Symptoms of Anxiety/Depression: Yes (due to illness and death of daughter)  History/Current Substance Use:   Social History     Tobacco Use    Smoking status: Never Smoker    Smokeless tobacco: Never Used   Substance and Sexual Activity    Alcohol use: No     Alcohol/week: 0.0 oz    Drug use: No    Sexual activity: Yes     Partners: Male     Birth control/protection: None     Comment:  19 years since        Indications of Abuse/Neglect: No:   Abuse Screen  Do You Feel Unsafe at Home, Work or School?: no    Financial:  Payor/Plan Subscr  Sex Relation Sub. Ins. ID Effective Group Num   1. MEDICAID - AM* MAIA SWARTZ* 1965 Female  [id number]* 12                                    [address]                            Other identified concerns/needs: pt. And spouse inquired about pt. Getting SSI payments as pt. States that her SSI payment was stopped once she  her  (19yrs ago), explained to pt/spouse that coy'er contacted Medicaid rep at [facility] and was informed that SSI was likely stopped due to husbands income. Per  he receives $2100/ month from social security and [employer]. It is likely that spouse income is above the SSI limit. Encouraged pt/spouse to contact social security to discuss concerns and to get  a firm answer. Pt. And spouse express understanding.     Plan: to return home with help from spouse.     Interventions/Referrals: TBD  Patient/caregiver engaged in treatment planning process.     providing psychosocial and supportive counseling, resources, education, assistance and discharge planning as appropriate.  Patient/caregiver state understanding of  available resources,  following, remains available. Provided pt. With sw'er phone # and encouraged her to call if needed. Will follow.

## 2018-12-05 NOTE — ASSESSMENT & PLAN NOTE
Patient with long standing depression that is not well controlled on her current medication regimen.  Did report some mild benefits, but is not able to fully articulate them during my assessment.  I reviewed the risks vs benefits of various treatment options and she has elected to proceed with starting Abilify 2 mg to augment her Cymbalta and to address auditory hallucinations.  Could also consider increasing her Cymbalta to 90 mg.  She does not at this time meet legal criteria for an involuntary psychiatric hold and it is recommended that she follow up with an outpatient psychiatrist.  On interview this AM has not yet received Abilify dose however will reassess this afternoon to ensure that she has no acute adverse effects.    Recommendations:  1) Continue Abilify 2 mg daily  2) Continue Cymbalta dose at 60 mg for now.  3) Will order repeat EKG.  4) Continue psychotherapy as outpatient.  5) Follow up with psychiatry as outpatient.

## 2018-12-05 NOTE — PROGRESS NOTES
[facility]  Gynecologic Oncology  Progress Note      Patient Name: Edita Riley  MRN: [id number]  Admission Date: 12/2/2018  Hospital Length of Stay: 3 days  Attending Provider: Refugio Lemon MD  Primary Care Provider: Hammad Lackey MD  Principal Problem: Seizure-like activity    Follow-up For: * No surgery found *  Post-Operative Day:    Subjective:      History of Present Illness:  Pt is a 53 year old female with metastatic cervical cancer who presented to the ED with aphasia and hypertonic extremities. Per patient history, she was sitting at home and had sudden onset fist clenching/muscle spasms and was unable to speak. Says the symptoms are starting to improve but still present - speech continues to be slurred. In ED, stroke code was called - imaging showed no evidence of stroke. Labs did show low magnesium, calcium, and potassium, however. Gyn Onc was consulted for further management. Pt reports that nothing like this has happened to her before. Is on chemotherapy and radiation for her recurrent cancer. (She is s/p surgery in 2015.) S/p 4 cycles of cisplatin (last 11/28.) Patient has experienced vomiting for the past few days. Has not been able to eat/drink much. Mild abdominal pain. No vaginal bleeding. Should be noted that patient was referred to social work last week for concerns of self-harm behavior.     Hospital Course:  12/02/2018 Admitted to gyn onc service for seizure-like activity. [facility] neuro consult ruled out stroke. Gen neuro consult placed. Started on lacosamide per [facility] neuro recs. Correcting electrolyte abnormalities.   12/03/2018 Pt reports some improvement in symptoms but continued difficulty with speech and hypertonia. On tele monitoring. Was evaluated by neuro and heme/onc psych. Desires psychiatry consult to discuss medications. EEG performed, awaiting results.  12/04/2018 kentrell neff'ed per neuro recs. Am lyte replacement - pm labs wnl.   12/05/2018 Starting  abilify per psych recs. Awaiting PT consult. Awaiting repeat MRI results.    Interval History: Pt reporting some improvement in symptoms. Was seen by psychiatry yesterday - started on abilify. Says she still feels weakness in her lower extremities. Denies pain, nausea, vomiting.     Scheduled Meds:   ARIPiprazole  2 mg Oral Daily    DULoxetine  60 mg Oral Daily    gabapentin  300 mg Oral BID    lisinopril  10 mg Oral Daily    pantoprazole  40 mg Oral Daily    traZODone  50 mg Oral QHS     Continuous Infusions:   dextrose 5 % and 0.45 % NaCl with KCl 20 mEq 125 mL/hr at 12/05/18 0306     PRN Meds:acetaminophen, HYDROcodone-acetaminophen, HYDROcodone-acetaminophen, lorazepam, ondansetron, sodium chloride 0.9%    Review of patient's allergies indicates:   Allergen Reactions    Bee sting [allergen ext-venom-honey bee]      Rash      Grass pollen-bermuda, standard      rash       Objective:     Vital Signs (Most Recent):  Temp: 97.7 °F (36.5 °C) (12/05/18 0305)  Pulse: 78 (12/05/18 0600)  Resp: 18 (12/05/18 0305)  BP: 139/83 (12/05/18 0305)  SpO2: 98 % (12/05/18 0305) Vital Signs (24h Range):  Temp:  [96.7 °F (35.9 °C)-98.6 °F (37 °C)] 97.7 °F (36.5 °C)  Pulse:  [] 78  Resp:  [16-18] 18  SpO2:  [95 %-99 %] 98 %  BP: (128-167)/(75-85) 139/83     Weight: 100.5 kg (221 lb 9 oz)  Body mass index is 33.69 kg/m².    Intake/Output - Last 3 Shifts       12/03 0700 - 12/04 0659 12/04 0700 - 12/05 0659    P.O. 480 1200    I.V. (mL/kg) 3175 (31.6) 3037.5 (30.2)    IV Piggyback 500     Total Intake(mL/kg) 4155 (41.3) 4237.5 (42.2)    Urine (mL/kg/hr) 750 (0.3) 600 (0.2)    Stool 0 0    Total Output 750 600    Net +3405 +3637.5          Urine Occurrence 1 x 4 x    Stool Occurrence 1 x 1 x             Physical Exam:   Constitutional: She is oriented to person, place, and time. She appears well-developed and well-nourished. No distress.    HENT:   Head: Normocephalic and atraumatic.       Pulmonary/Chest: Effort normal.  No respiratory distress.        Abdominal: Soft. She exhibits no distension. There is no tenderness. There is no rebound and no guarding.             Musculoskeletal: Moves all extremeties.       Neurological: She is alert and oriented to person, place, and time.   Tone now normal in upper extremities. Possible increased tone in lower extremities but appears to be related to patient resisting exam. Will not left legs off of bed but can wiggle toes.    Skin: Skin is warm and dry. She is not diaphoretic.    Psychiatric:   Flat affect       Lines/Drains/Airways     Peripheral Intravenous Line                 Peripheral IV - Single Lumen 12/02/18 1920 Left Forearm 2 days         Peripheral IV - Single Lumen 12/02/18 2107 Right Hand 2 days                Laboratory:  BMP:   Recent Labs   Lab 12/03/18  1635 12/04/18  0540 12/04/18  1520   * 113* 130*    138 138   K 3.1* 3.3* 3.5   CL 99 103 100   CO2 27 27 29   BUN 12 9 8   CREATININE 1.1 0.8 1.0   CALCIUM 7.5* 7.3* 8.0*   MG 1.1* 1.5* 1.9    and CBC:   Recent Labs   Lab 12/04/18  0540   WBC 3.31*   HGB 10.5*   HCT 32.8*   *     Mag: .7 > .9 > 1.1  Awaiting am labs    Diagnostic Results:  EEG normal per neurology note  Repeat MRI pending    Assessment/Plan:     * Seizure-like activity    --underwent stroke eval per [facility] neuro: low suspicion for stroke  --episode may be related to seizure: neurology on board, appreciate recs  ----EEG performed - normal per neuro note  [facility]  ----awaiting MRI w/w/o contrast (epilepsy protocol)  --continuing electrolyte replacement PRN  --s/p speech eval: regular diet and PO meds okay - rec outpatient speech therapy  --awaiting PT consult     Electrolyte disorder    --Mg .7, Ca2+ 7.0 (corrected - 7.5), K 3.2 upon admission  --replacement ordered by ED physician  --continuing replacement PRN. Labs normal yesterday afternoon. Awaiting am labs.     Metastatic squamous cell carcinoma to lymph node    --h/o  cervical cancer s/p surgery in 2015  --now on cisplatin (s/p 4 cycles, last 11/28) and radiation (M-F)  --no evidence of brain mets on imaging, MRI w contrast performed - pending results     Gastroesophageal reflux disease with esophagitis    --PO protonix daily     Severe episode of recurrent major depressive disorder, with psychotic features    --h/o suicide attempt, recent sw encounter for selfharm behavior  --death of daughter by suicide earlier this year  --seen by heme/onc psych -- reports suicidal ideation but no intent/plan  --psych on board  --starting abilify per psych recs  --continue home cymbalta, trazodone   --will need outpatient psychiatry follow up     Essential hypertension    --BP: (128-167)/(75-85) 139/83  --continue home lisinopril       VTE Risk Mitigation (From admission, onward)        Ordered     Place sequential compression device  Until discontinued      12/02/18 2111     IP VTE HIGH RISK PATIENT  Once      12/02/18 2111        DISPO: anticipate dc today vs tomorrow. Awaiting am labs and PT recs.    Pelon Burden MD  Gynecologic Oncology  [facility]

## 2018-12-05 NOTE — ASSESSMENT & PLAN NOTE
--Mg .7, Ca2+ 7.0 (corrected - 7.5), K 3.2 upon admission  --replacement ordered by ED physician  --continuing replacement PRN. Labs normal yesterday afternoon. Awaiting am labs.

## 2018-12-05 NOTE — PLAN OF CARE
Problem: Patient Care Overview  Goal: Plan of Care Review  Outcome: Ongoing (interventions implemented as appropriate)  Plan of care reviewed with patient. No acute events this shift. Radiation completed. IVF infusing. Bed low and locked with call light in reach. NAD at this time. Will monitor.

## 2018-12-05 NOTE — PT/OT/SLP PROGRESS
Speech Language Pathology Treatment    Patient Name:  Edita Riley   MRN:  [id number]   [id number]    Admitting Diagnosis: Seizure-like activity    Recommendations:                 General Recommendations:  Cognitive-linguistic therapy, consider ENT consult should hoarse vocal quality persist.  Diet recommendations:  Regular, Liquid Diet Level: Thin   Aspiration Precautions: Standard aspiration precautions   General Precautions: Standard,    Communication strategies:  none    Subjective     Patient awake and cooperative.   Patient goals: to get better     Pain/Comfort:  ·   no pain reported    Objective:     Has the patient been evaluated by SLP for swallowing?   Yes  Keep patient NPO? No   Current Respiratory Status: room air      Reading, writing, and visualspatial skills evaluated and noted to be at baseline. Patient completed 3 word mental manipulation tasks with 100% acc no repetitions and 4 word tasks with 40% acc given repetitions. She answered simple money/time management questions given moderate-max cues. She completed convergent naming tasks with 85% accuracy. She answered basic problem solving questions with 80% accuracy no cues and 100% acc given min cues. Patient's vocal quality intermittently improved throughout session, however, noted continue hoarseness. Patient reports noted improvement s/p drinking water. SLP reviewed good vocal hygiene recommendations. Patient verbalized understanding. SLP reviewed memory strategies, SLP POC, and ways to target language/cognition independently. She verbalized understanding.     Assessment:     Edita Riley is a 53 y.o. female with an SLP diagnosis of mild Cognitive-Linguistic Impairment. ST will continue to follow.      Goals:   Multidisciplinary Problems     SLP Goals        Problem: SLP Goal    Goal Priority Disciplines Outcome   SLP Goal     SLP Ongoing (interventions implemented as appropriate)   Description:  Speech Therapy Short Term  Goals  Goal expected to be met by 12/18  1. Patient will answer functional problem solving questions with 85% acc no cues.   2. Patient will solve simple money/time management problems with 75% acc min cues.   3. Patient will complete convergent naming tasks with 90% acc no cues.   4. Patient will follow functional complex directions with 85% acc given one repetition.  5. Patient will participate in further assessment of mental manipulation, reading, and writing skills. -MET    Speech Therapy Short Term Goals  Goal expected to be met by 12/10  1. Pt will participate in a bedside swallow study to determine the safest and least restrictive possible po diet with possible updated goals to follow pending results. -MET  2. Pt will participate in a speech, language, and cognitive evaluation with possible updated goals to follow pending results. -MET                        Plan:     · Patient to be seen:  3 x/week   · Plan of Care expires:  01/01/19  · Plan of Care reviewed with:  patient   · SLP Follow-Up:  Yes       Discharge recommendations:  home, outpatient speech therapy   Barriers to Discharge:  None per ST discipline    Time Tracking:     SLP Treatment Date:   12/05/18  Speech Start Time:  0954  Speech Stop Time:  1020     Speech Total Time (min):  26 min    Billable Minutes: Speech Therapy Individual 26    VENUS Gill, CCC-SLP   Pager: [phone]  12/05/2018

## 2018-12-05 NOTE — PT/OT/SLP EVAL
Physical Therapy Evaluation    Patient Name:  Edita Riley   MRN:  [id number]    Recommendations:     Discharge Recommendations:  home health PT, home health OT   Discharge Equipment Recommendations: none   Barriers to discharge: Inaccessible home (3 MANAV)    Assessment:     Edita Riley is a 53 y.o. female admitted with a medical diagnosis of Seizure-like activity.  She presents with the following impairments/functional limitations:  weakness, gait instability, impaired endurance, impaired balance, impaired self care skills, impaired functional mobilty. Pt able to complete functional mobility without physical assist or use of DME. Ambulated greater than household distance, but with some instability and altered mechanics noted. Decreased endurance and generalized weakness noted, limiting further gait distance. Pt would benefit from skilled acute PT in order to address current deficits and progress functional mobility.     Rehab Prognosis:  good; patient would benefit from acute skilled PT services to address these deficits and reach maximum level of function.      Recent Surgery: * No surgery found *      Plan:     During this hospitalization, patient to be seen 3 x/week to address the above listed problems via gait training, therapeutic activities, therapeutic exercises, neuromuscular re-education  · Plan of Care Expires:  01/03/19   Plan of Care Reviewed with: patient    Subjective     Communicated with RN prior to session.  Patient found long-sitting in bed upon PT entry to room, agreeable to evaluation.      Chief Complaint: none noted   Patient comments/goals: return home   Pain/Comfort:  · Pain Rating 1: 8/10  · Location - Side 1: Bilateral  · Location 1: leg  · Pain Addressed 1: Reposition, Distraction    Patients cultural, spiritual, Denominational conflicts given the current situation: none noted     Living Environment:  Pt lives with her  and 20 y/o daughter in an apt with 3 MANAV,  L handrail.   Prior to admission, patient was independent; but required assist just prior to admission.  Patient has the following equipment: none.  DME owned (not currently used): none.  Upon discharge, patient will have assistance from  and daughter.    Objective:     Patient found with: telemetry, peripheral IV     General Precautions: Standard, fall, seizure   Orthopedic Precautions:N/A   Braces: N/A     Exams:  · Cognitive Exam:  Patient is oriented to Person, Place, Time and Situation  · Fine Motor Coordination:    · -       Intact  · Rigid movements noted BUE  · Sensation:    · -       Impaired  light/touch B feet  · RLE ROM: WFL  · RLE Strength: WFL  · LLE ROM: WFL  · LLE Strength: WFL    Functional Mobility:  · Bed Mobility:     · Supine to Sit: supervision and with HOB elevated (long-sit > sit EOB)  · Transfers:     · Sit to Stand:  contact guard assistance with no AD  · Gait: 140 ft. with CGA and no AD  · Decreased karma, decreased step length, impaired weight-shifting ability, decreased toe-floor clearance, decreased arm swing, decreased fluidity of movements  · Cues for increased arm swing and increased fluidity of movements     AM-PAC 6 CLICK MOBILITY  Total Score:20       Therapeutic Activities and Exercises:   Pt educated on role of PT and PT POC. Pt verbalized understanding.   Pt instructed to have staff assist for all standing tasks/transfers. Pt v/u.     Patient left up in chair with all lines intact and call button in reach.    GOALS:   Multidisciplinary Problems     Physical Therapy Goals        Problem: Physical Therapy Goal    Goal Priority Disciplines Outcome Goal Variances Interventions   Physical Therapy Goal     PT, PT/OT Ongoing (interventions implemented as appropriate)     Description:  Goals to be met by: 12/15/18     Patient will increase functional independence with mobility by performin. Supine to sit with Mod-I  2. Sit to stand transfer with Supervision  3. Gait  x  150 feet with Supervision using LRAD if needed.   4. Ascend/descend 3 stair with left Handrails Contact Guard Assistance.   5. Lower extremity exercise program x15 reps, with supervision, in order to increase LE strength and (I) with functional mobility.                       History:     Past Medical History:   Diagnosis Date    Anxiety     Cervical cancer 2014    Chronic back pain     Depression     Diarrhea due to malabsorption 11/14/2018    Fibromyalgia     GERD (gastroesophageal reflux disease)     Hiatal hernia 2014    History of cervical cancer 10/11/2018    Hx of psychiatric care     Cymbalta, trazodone    Hypertension     Hypomagnesemia 11/21/2018    Lactose intolerance     Metastatic squamous cell carcinoma to lymph node 10/11/2018    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 drug 11/14/2018    Osteoarthritis of back     Psychiatric problem     Stroke 1972       Past Surgical History:   Procedure Laterality Date    BILATERAL OOPHORECTOMY  2015    CHOLECYSTECTOMY  11/09/2016    Done at [facility], path showed chronic cholecystitis and gallstones    CHOLECYSTECTOMY-LAPAROSCOPIC N/A 11/9/2016    Performed by Joshua Goldberg, [facility] at [facility] OR [facility]    cold knife conization  2014    COLONOSCOPY  2014    COLONOSCOPY N/A 10/24/2016    at [facility], Dr Thomas    COLONOSCOPY N/A 5/18/2018    Procedure: COLONOSCOPY;  Surgeon: Arden Gutiérrez MD;  Location: [facility] ([facility]);  Service: Endoscopy;  Laterality: N/A;    COLONOSCOPY N/A 5/18/2018    Performed by Arden Gutiérrez MD at [facility] ([facility])    COLONOSCOPY N/A 10/24/2016    Performed by Arden Gutiérrez MD at [facility] ([facility])    ESOPHAGOGASTRODUODENOSCOPY  2014    ESOPHAGOGASTRODUODENOSCOPY (EGD) N/A 10/24/2016    Performed by Arden Gutiérrez MD at [facility] ([facility])    HYSTERECTOMY  2014    [facility] for cervical [facility]    LYMPHADENECTOMY, RETROPERITONEUM Right 9/17/2018    Performed by Sebas Reed MD at [facility] OR [facility]     OOPHORECTOMY      RETROPERITONEAL LYMPHADENECTOMY Right 9/17/2018    Procedure: LYMPHADENECTOMY, RETROPERITONEUM;  Surgeon: Sebas Reed MD;  Location: [facility] OR [address] [place] [address];  Service: General;  Laterality: Right;  ILIAC       Clinical Decision Making:     History  Co-morbidities and personal factors that may impact the plan of care Examination  Body Structures and Functions, activity limitations and participation restrictions that may impact the plan of care Clinical Presentation   Decision Making/ Complexity Score   Co-morbidities:   [x] Time since onset of injury / illness / exacerbation  [] Status of current condition  []Patient's cognitive status and safety concerns    [x] Multiple Medical Problems (see med hx)  Personal Factors:   [] Patient's age  [] Prior Level of function   [] Patient's home situation (environment and family support)  [] Patient's level of motivation  [] Expected progression of patient      HISTORY:(criteria)    [] 78908 - no personal factors/history    [x] 34913 - has 1-2 personal factor/comorbidity     [] 11498 - has >3 personal factor/comorbidity     Body Regions:  [x] Objective examination findings  [] Head     []  Neck  [] Trunk   [] Upper Extremity  [] Lower Extremity    Body Systems:  [] For communication ability, affect, cognition, language, and learning style: the assessment of the ability to make needs known, consciousness, orientation (person, place, and time), expected emotional /behavioral responses, and learning preferences (eg, learning barriers, education  needs)  [x] For the neuromuscular system: a general assessment of gross coordinated movement (eg, balance, gait, locomotion, transfers, and transitions) and motor function  (motor control and motor learning)  [x] For the musculoskeletal system: the assessment of gross symmetry, gross range of motion, gross strength, height, and weight  [] For the integumentary system: the assessment of pliability(texture), presence of  scar formation, skin color, and skin integrity  [] For cardiovascular/pulmonary system: the assessment of heart rate, respiratory rate, blood pressure, and edema     Activity limitations:    [] Patient's cognitive status and saf ety concerns          [] Status of current condition      [] Weight bearing restriction  [] Cardiopulmunary Restriction    Participation Restrictions:   [] Goals and goal agreement with the patient     [] Rehab potential (prognosis) and probable outcome      Examination of Body System: (criteria)    [] 30509 - addressing 1-2 elements    [x] 25621 - addressing a total of 3 or more elements     [] 71481 -  Addressing a total of 4 or more elements         Clinical Presentation: (criteria)  Stable - 37727     On examination of body system using standardized tests and measures patient presents with 3 or more elements from any of the following: body structures and functions, activity limitations, and/or participation restrictions.  Leading to a clinical presentation that is considered stable and/or uncomplicated                              Clinical Decision Making  (Eval Complexity):  Low- 11257     Time Tracking:     PT Received On: 12/05/18  PT Start Time: 1245     PT Stop Time: 1305  PT Total Time (min): 20 min      Billable Minutes: Evaluation 20  (co-eval with OT)    Sofia Amaral, PT, DPT   12/5/2018  [phone]
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
## 2018-12-05 NOTE — PLAN OF CARE
Problem: Occupational Therapy Goal  Goal: Occupational Therapy Goal  Goals to be met by: 12/12/18     Patient will increase functional independence with ADLs by performing:    UE Dressing with Supervision.  LE Dressing with Supervision.  Grooming while standing at sink with Supervision.  Toileting from toilet with Supervision for hygiene and clothing management.   Toilet transfer to toilet with Supervision.    Outcome: Ongoing (interventions implemented as appropriate)  Goals established  Cassidy Sampson OTR

## 2018-12-05 NOTE — TELEPHONE ENCOUNTER
Patient's  says patient is still in the hospital and they will call to make a follow up appointment when she is released.
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
## 2018-12-05 NOTE — ASSESSMENT & PLAN NOTE
--h/o cervical cancer s/p surgery in 2015  --now on cisplatin (s/p 4 cycles, last 11/28) and radiation (M-F)  --no evidence of brain mets on imaging, MRI w contrast performed - pending results

## 2018-12-05 NOTE — PLAN OF CARE
Problem: SLP Goal  Goal: SLP Goal  Speech Therapy Short Term Goals  Goal expected to be met by 12/18  1. Patient will answer functional problem solving questions with 85% acc no cues.   2. Patient will solve simple money/time management problems with 75% acc min cues.   3. Patient will complete convergent naming tasks with 90% acc no cues.   4. Patient will follow functional complex directions with 85% acc given one repetition.  5. Patient will participate in further assessment of mental manipulation, reading, and writing skills. -MET    Speech Therapy Short Term Goals  Goal expected to be met by 12/10  1. Pt will participate in a bedside swallow study to determine the safest and least restrictive possible po diet with possible updated goals to follow pending results. -MET  2. Pt will participate in a speech, language, and cognitive evaluation with possible updated goals to follow pending results. -MET      Outcome: Ongoing (interventions implemented as appropriate)  Goals remain appropriate. ST will continue to follow.   EDGARDO Morley., CCC-SLP  Pager: [phone]  12/05/2018

## 2018-12-05 NOTE — ASSESSMENT & PLAN NOTE
--h/o suicide attempt, recent sw encounter for selfharm behavior  --death of daughter by suicide earlier this year  --seen by heme/onc psych -- reports suicidal ideation but no intent/plan  --psych on board  --starting abilify per psych recs  --continue home cymbalta, trazodone   --will need outpatient psychiatry follow up

## 2018-12-05 NOTE — ASSESSMENT & PLAN NOTE
--underwent stroke eval per [facility] neuro: low suspicion for stroke  --episode may be related to seizure: neurology on board, appreciate recs  ----EEG performed - normal per neuro note  ----lacosamide dc'ed  ----awaiting MRI w/w/o contrast (epilepsy protocol)  --continuing electrolyte replacement PRN  --s/p speech eval: regular diet and PO meds okay - rec outpatient speech therapy  --awaiting PT consult

## 2018-12-05 NOTE — SUBJECTIVE & OBJECTIVE
Interval History: Pt reporting some improvement in symptoms. Was seen by psychiatry yesterday - started on abilify. Says she still feels weakness in her lower extremities. Denies pain, nausea, vomiting.     Scheduled Meds:   ARIPiprazole  2 mg Oral Daily    DULoxetine  60 mg Oral Daily    gabapentin  300 mg Oral BID    lisinopril  10 mg Oral Daily    pantoprazole  40 mg Oral Daily    traZODone  50 mg Oral QHS     Continuous Infusions:   dextrose 5 % and 0.45 % NaCl with KCl 20 mEq 125 mL/hr at 12/05/18 0306     PRN Meds:acetaminophen, HYDROcodone-acetaminophen, HYDROcodone-acetaminophen, lorazepam, ondansetron, sodium chloride 0.9%    Review of patient's allergies indicates:   Allergen Reactions    Bee sting [allergen ext-venom-honey bee]      Rash      Grass pollen-bermuda, standard      rash       Objective:     Vital Signs (Most Recent):  Temp: 97.7 °F (36.5 °C) (12/05/18 0305)  Pulse: 78 (12/05/18 0600)  Resp: 18 (12/05/18 0305)  BP: 139/83 (12/05/18 0305)  SpO2: 98 % (12/05/18 0305) Vital Signs (24h Range):  Temp:  [96.7 °F (35.9 °C)-98.6 °F (37 °C)] 97.7 °F (36.5 °C)  Pulse:  [] 78  Resp:  [16-18] 18  SpO2:  [95 %-99 %] 98 %  BP: (128-167)/(75-85) 139/83     Weight: 100.5 kg (221 lb 9 oz)  Body mass index is 33.69 kg/m².    Intake/Output - Last 3 Shifts       12/03 0700 - 12/04 0659 12/04 0700 - 12/05 0659    P.O. 480 1200    I.V. (mL/kg) 3175 (31.6) 3037.5 (30.2)    IV Piggyback 500     Total Intake(mL/kg) 4155 (41.3) 4237.5 (42.2)    Urine (mL/kg/hr) 750 (0.3) 600 (0.2)    Stool 0 0    Total Output 750 600    Net +3405 +3637.5          Urine Occurrence 1 x 4 x    Stool Occurrence 1 x 1 x             Physical Exam:   Constitutional: She is oriented to person, place, and time. She appears well-developed and well-nourished. No distress.    HENT:   Head: Normocephalic and atraumatic.       Pulmonary/Chest: Effort normal. No respiratory distress.        Abdominal: Soft. She exhibits no distension.  There is no tenderness. There is no rebound and no guarding.             Musculoskeletal: Moves all extremeties.       Neurological: She is alert and oriented to person, place, and time.   Tone now normal in upper extremities. Possible increased tone in lower extremities but appears to be related to patient resisting exam. Will not left legs off of bed but can wiggle toes.    Skin: Skin is warm and dry. She is not diaphoretic.    Psychiatric:   Flat affect       Lines/Drains/Airways     Peripheral Intravenous Line                 Peripheral IV - Single Lumen 12/02/18 1920 Left Forearm 2 days         Peripheral IV - Single Lumen 12/02/18 2107 Right Hand 2 days                Laboratory:  BMP:   Recent Labs   Lab 12/03/18  1635 12/04/18  0540 12/04/18  1520   * 113* 130*    138 138   K 3.1* 3.3* 3.5   CL 99 103 100   CO2 27 27 29   BUN 12 9 8   CREATININE 1.1 0.8 1.0   CALCIUM 7.5* 7.3* 8.0*   MG 1.1* 1.5* 1.9    and CBC:   Recent Labs   Lab 12/04/18  0540   WBC 3.31*   HGB 10.5*   HCT 32.8*   *     Mag: .7 > .9 > 1.1  Awaiting am labs    Diagnostic Results:  EEG normal per neurology note  Repeat MRI pending

## 2018-12-05 NOTE — SUBJECTIVE & OBJECTIVE
"Interval History: see hospital course    Family History     Problem Relation (Age of Onset)    Breast cancer Maternal Aunt    Cancer Father, Mother    Cervical cancer Mother    Colon cancer Father    Drug abuse Daughter, Daughter    Esophageal cancer Father    Heart disease Sister, Maternal Grandmother    Suicide Daughter        Tobacco Use    Smoking status: Never Smoker    Smokeless tobacco: Never Used   Substance and Sexual Activity    Alcohol use: No     Alcohol/week: 0.0 oz    Drug use: No    Sexual activity: Yes     Partners: Male     Birth control/protection: None     Comment:  17 years since 1999     Psychotherapeutics (From admission, onward)    Start     Stop Route Frequency Ordered    12/05/18 0900  ARIPiprazole tablet 2 mg      -- Oral Daily 12/05/18 0606    12/03/18 2100  traZODone tablet 50 mg      -- Oral Nightly 12/03/18 1353    12/03/18 1500  DULoxetine DR capsule 60 mg      -- Oral Daily 12/03/18 1353    12/02/18 2326  lorazepam injection 0.5 mg      -- IV 3 times daily PRN 12/02/18 2227           Review of Systems  Objective:     Vital Signs (Most Recent):  Temp: 97.4 °F (36.3 °C) (12/05/18 0745)  Pulse: 87 (12/05/18 0814)  Resp: 16 (12/05/18 0745)  BP: (!) 146/81 (12/05/18 0745)  SpO2: 95 % (12/05/18 0745) Vital Signs (24h Range):  Temp:  [96.7 °F (35.9 °C)-98.3 °F (36.8 °C)] 97.4 °F (36.3 °C)  Pulse:  [] 87  Resp:  [16-18] 16  SpO2:  [95 %-99 %] 95 %  BP: (128-146)/(75-84) 146/81     Height: 5' 8" (172.7 cm)  Weight: 100.5 kg (221 lb 9 oz)  Body mass index is 33.69 kg/m².      Intake/Output Summary (Last 24 hours) at 12/5/2018 1025  Last data filed at 12/5/2018 0921  Gross per 24 hour   Intake 3328.33 ml   Output 500 ml   Net 2828.33 ml       Physical Exam   Psychiatric:   Mental Status Exam:  Appearance: age appropriate, overweight, and appropriately dressed  Level of Consciousness: alert  Behavior/Cooperation: normal, friendly and cooperative, psychomotor retardation, eye " "contact normal   Speech: normal pitch, normal volume, slowed, mumbled at times  Language: english, fluid  Orientation: person, place, situation, time/date  Attention Span/Concentration: able to report months of the year in reverse however delayed processing speed was noted.  Memory: Remote intact and Recent intact  Mood: "alright"  Affect: constricted  Thought Process: linear, normal and logical  Associations: no loosening of associations  Thought Content: Denies SI/HI/AH/VH does not appear to be responding to internal stimuli at this time.  Fund of Knowledge: Intact  Insight: fair  Judgment: improving        Significant Labs:   Last 24 Hours:   Recent Lab Results       12/05/18  0714   12/04/18  1520        Immature Granulocytes 0.6       Immature Grans (Abs) 0.02  Comment:  Mild elevation in immature granulocytes is non specific and   can be seen in a variety of conditions including stress response,   acute inflammation, trauma and pregnancy. Correlation with other   laboratory and clinical findings is essential.         Albumin 2.9       Alkaline Phosphatase 131       ALT 47       Anion Gap 8 9     AST 22       Baso # 0.01       Basophil% 0.3       Total Bilirubin 0.3  Comment:  For infants and newborns, interpretation of results should be based  on gestational age, weight and in agreement with clinical  observations.  Premature Infant recommended reference ranges:  Up to 24 hours.............<8.0 mg/dL  Up to 48 hours............<12.0 mg/dL  3-5 days..................<15.0 mg/dL  6-29 days.................<15.0 mg/dL         BUN, Bld 7 8     Calcium 8.0 8.0     Chloride 103 100     CO2 29 29     Creatinine 0.9 1.0     Differential Method Automated       eGFR if  >60.0 >60.0     eGFR if non  >60.0  Comment:  Calculation used to obtain the estimated glomerular filtration  rate (eGFR) is the CKD-EPI equation.    >60.0  Comment:  Calculation used to obtain the estimated glomerular " filtration  rate (eGFR) is the CKD-EPI equation.        Eos # 0.0       Eosinophil% 0.9       Glucose 132 130     Gran # (ANC) 2.4       Gran% 74.2       Hematocrit 31.8       Hemoglobin 10.5       Lymph # 0.3       Lymph% 8.6       Magnesium 1.0 1.9     MCH 28.1       MCHC 33.0       MCV 85       Mono # 0.5       Mono% 15.4       MPV 9.8       nRBC 0       Phosphorus 2.9       Platelets 118       Potassium 3.7 3.5     Total Protein 6.3       RBC 3.74       RDW 14.2       Sodium 140 138     WBC 3.25             Significant Imaging: I have reviewed all pertinent imaging results/findings within the past 24 hours.

## 2018-12-05 NOTE — PLAN OF CARE
Problem: Physical Therapy Goal  Goal: Physical Therapy Goal  Goals to be met by: 12/15/18     Patient will increase functional independence with mobility by performin. Supine to sit with Mod-I  2. Sit to stand transfer with Supervision  3. Gait  x 150 feet with Supervision using LRAD if needed.   4. Ascend/descend 3 stair with left Handrails Contact Guard Assistance.   5. Lower extremity exercise program x15 reps, with supervision, in order to increase LE strength and (I) with functional mobility.     Outcome: Ongoing (interventions implemented as appropriate)  PT evaluation complete and appropriate goals established.    Sofia Amaral, PT, DPT   2018  [phone]

## 2018-12-06 ENCOUNTER — DOCUMENTATION ONLY (OUTPATIENT)
Dept: RADIATION ONCOLOGY | Facility: CLINIC | Age: 53
End: 2018-12-06

## 2018-12-06 ENCOUNTER — TELEPHONE (OUTPATIENT)
Dept: NEUROLOGY | Facility: CLINIC | Age: 53
End: 2018-12-06

## 2018-12-06 VITALS
SYSTOLIC BLOOD PRESSURE: 144 MMHG | BODY MASS INDEX: 33.58 KG/M2 | WEIGHT: 221.56 LBS | OXYGEN SATURATION: 99 % | HEIGHT: 68 IN | DIASTOLIC BLOOD PRESSURE: 83 MMHG | TEMPERATURE: 98 F | HEART RATE: 85 BPM | RESPIRATION RATE: 18 BRPM

## 2018-12-06 LAB
ALBUMIN SERPL BCP-MCNC: 2.9 G/DL
ALP SERPL-CCNC: 136 U/L
ALT SERPL W/O P-5'-P-CCNC: 43 U/L
ANION GAP SERPL CALC-SCNC: 8 MMOL/L
AST SERPL-CCNC: 18 U/L
BASOPHILS # BLD AUTO: 0.01 K/UL
BASOPHILS NFR BLD: 0.3 %
BILIRUB SERPL-MCNC: 0.3 MG/DL
BUN SERPL-MCNC: 9 MG/DL
CALCIUM SERPL-MCNC: 8.1 MG/DL
CHLORIDE SERPL-SCNC: 100 MMOL/L
CO2 SERPL-SCNC: 30 MMOL/L
CREAT SERPL-MCNC: 0.8 MG/DL
DIFFERENTIAL METHOD: ABNORMAL
EOSINOPHIL # BLD AUTO: 0 K/UL
EOSINOPHIL NFR BLD: 0.8 %
ERYTHROCYTE [DISTWIDTH] IN BLOOD BY AUTOMATED COUNT: 14.1 %
EST. GFR  (AFRICAN AMERICAN): >60 ML/MIN/1.73 M^2
EST. GFR  (NON AFRICAN AMERICAN): >60 ML/MIN/1.73 M^2
GLUCOSE SERPL-MCNC: 154 MG/DL
HCT VFR BLD AUTO: 31.1 %
HGB BLD-MCNC: 9.9 G/DL
IMM GRANULOCYTES # BLD AUTO: 0.01 K/UL
IMM GRANULOCYTES NFR BLD AUTO: 0.3 %
LYMPHOCYTES # BLD AUTO: 0.3 K/UL
LYMPHOCYTES NFR BLD: 8.2 %
MAGNESIUM SERPL-MCNC: 1.2 MG/DL
MCH RBC QN AUTO: 27.1 PG
MCHC RBC AUTO-ENTMCNC: 31.8 G/DL
MCV RBC AUTO: 85 FL
MONOCYTES # BLD AUTO: 0.5 K/UL
MONOCYTES NFR BLD: 14.9 %
NEUTROPHILS # BLD AUTO: 2.7 K/UL
NEUTROPHILS NFR BLD: 75.5 %
NRBC BLD-RTO: 0 /100 WBC
PLATELET # BLD AUTO: 120 K/UL
PMV BLD AUTO: 9.6 FL
POTASSIUM SERPL-SCNC: 3.9 MMOL/L
PROT SERPL-MCNC: 6.3 G/DL
RBC # BLD AUTO: 3.65 M/UL
SODIUM SERPL-SCNC: 138 MMOL/L
WBC # BLD AUTO: 3.55 K/UL

## 2018-12-06 PROCEDURE — 25000003 PHARM REV CODE 250: Performed by: STUDENT IN AN ORGANIZED HEALTH CARE EDUCATION/TRAINING PROGRAM

## 2018-12-06 PROCEDURE — 80053 COMPREHEN METABOLIC PANEL: CPT

## 2018-12-06 PROCEDURE — 85025 COMPLETE CBC W/AUTO DIFF WBC: CPT

## 2018-12-06 PROCEDURE — 99231 SBSQ HOSP IP/OBS SF/LOW 25: CPT | Mod: ,,, | Performed by: OBSTETRICS & GYNECOLOGY

## 2018-12-06 PROCEDURE — 77300 RADIATION THERAPY DOSE PLAN: CPT | Mod: TC | Performed by: RADIOLOGY

## 2018-12-06 PROCEDURE — 77338 DESIGN MLC DEVICE FOR IMRT: CPT | Mod: TC | Performed by: RADIOLOGY

## 2018-12-06 PROCEDURE — 77387 GUIDANCE FOR RADJ TX DLVR: CPT | Mod: TC | Performed by: RADIOLOGY

## 2018-12-06 PROCEDURE — 83735 ASSAY OF MAGNESIUM: CPT

## 2018-12-06 PROCEDURE — 77300 RADIATION THERAPY DOSE PLAN: CPT | Mod: 26,,, | Performed by: RADIOLOGY

## 2018-12-06 PROCEDURE — 77338 DESIGN MLC DEVICE FOR IMRT: CPT | Mod: 26,,, | Performed by: RADIOLOGY

## 2018-12-06 PROCEDURE — 77386 HC IMRT, COMPLEX: CPT | Performed by: RADIOLOGY

## 2018-12-06 PROCEDURE — 63600175 PHARM REV CODE 636 W HCPCS: Performed by: STUDENT IN AN ORGANIZED HEALTH CARE EDUCATION/TRAINING PROGRAM

## 2018-12-06 PROCEDURE — 77387 GUIDANCE FOR RADJ TX DLVR: CPT | Mod: ,,, | Performed by: RADIOLOGY

## 2018-12-06 PROCEDURE — 36415 COLL VENOUS BLD VENIPUNCTURE: CPT

## 2018-12-06 RX ORDER — SUMATRIPTAN 50 MG/1
50 TABLET, FILM COATED ORAL ONCE
Status: COMPLETED | OUTPATIENT
Start: 2018-12-06 | End: 2018-12-06

## 2018-12-06 RX ORDER — POTASSIUM CHLORIDE 20 MEQ/1
TABLET, EXTENDED RELEASE ORAL DAILY
Qty: 30 TABLET | Refills: 0 | Status: SHIPPED | OUTPATIENT
Start: 2018-12-06 | End: 2019-01-18 | Stop reason: SDUPTHER

## 2018-12-06 RX ORDER — LANOLIN ALCOHOL/MO/W.PET/CERES
400 CREAM (GRAM) TOPICAL 3 TIMES DAILY
Qty: 30 TABLET | Refills: 3 | COMMUNITY
Start: 2018-12-06 | End: 2019-01-18 | Stop reason: SDUPTHER

## 2018-12-06 RX ORDER — ARIPIPRAZOLE 2 MG/1
2 TABLET ORAL DAILY
Qty: 30 TABLET | Refills: 11 | Status: SHIPPED | OUTPATIENT
Start: 2018-12-06 | End: 2019-09-19

## 2018-12-06 RX ORDER — MAGNESIUM SULFATE HEPTAHYDRATE 40 MG/ML
2 INJECTION, SOLUTION INTRAVENOUS ONCE
Status: COMPLETED | OUTPATIENT
Start: 2018-12-06 | End: 2018-12-06

## 2018-12-06 RX ORDER — IBUPROFEN 400 MG/1
800 TABLET ORAL ONCE
Status: COMPLETED | OUTPATIENT
Start: 2018-12-06 | End: 2018-12-06

## 2018-12-06 RX ADMIN — PANTOPRAZOLE SODIUM 40 MG: 40 TABLET, DELAYED RELEASE ORAL at 08:12

## 2018-12-06 RX ADMIN — ARIPIPRAZOLE 2 MG: 2 TABLET ORAL at 08:12

## 2018-12-06 RX ADMIN — GABAPENTIN 300 MG: 300 CAPSULE ORAL at 08:12

## 2018-12-06 RX ADMIN — SUMATRIPTAN SUCCINATE 50 MG: 50 TABLET ORAL at 11:12

## 2018-12-06 RX ADMIN — IBUPROFEN 800 MG: 400 TABLET, FILM COATED ORAL at 08:12

## 2018-12-06 RX ADMIN — DEXTROSE MONOHYDRATE, SODIUM CHLORIDE, AND POTASSIUM CHLORIDE: 50; 4.5; 1.49 INJECTION, SOLUTION INTRAVENOUS at 04:12

## 2018-12-06 RX ADMIN — MAGNESIUM SULFATE IN WATER 2 G: 40 INJECTION, SOLUTION INTRAVENOUS at 11:12

## 2018-12-06 RX ADMIN — DULOXETINE 60 MG: 60 CAPSULE, DELAYED RELEASE ORAL at 08:12

## 2018-12-06 RX ADMIN — LISINOPRIL 10 MG: 10 TABLET ORAL at 08:12

## 2018-12-06 RX ADMIN — MAGNESIUM OXIDE TAB 400 MG (241.3 MG ELEMENTAL MG) 400 MG: 400 (241.3 MG) TAB at 08:12

## 2018-12-06 NOTE — ASSESSMENT & PLAN NOTE
--h/o cervical cancer s/p surgery in 2015  --now on cisplatin (s/p 4 cycles, last 11/28) and radiation (M-F)  --no evidence of brain mets on imaging

## 2018-12-06 NOTE — SUBJECTIVE & OBJECTIVE
Interval History: Pt reporting migraine today. Otherwise no complaints. Tolerating diet. Some pain/weakness in legs but improved. No nausea/vomiting.     Scheduled Meds:   ARIPiprazole  2 mg Oral Daily    DULoxetine  60 mg Oral Daily    gabapentin  300 mg Oral BID    ibuprofen  800 mg Oral Once    lisinopril  10 mg Oral Daily    magnesium oxide  400 mg Oral BID    pantoprazole  40 mg Oral Daily    traZODone  50 mg Oral QHS     Continuous Infusions:   dextrose 5 % and 0.45 % NaCl with KCl 20 mEq 125 mL/hr at 12/06/18 0459     PRN Meds:acetaminophen, HYDROcodone-acetaminophen, HYDROcodone-acetaminophen, lorazepam, ondansetron, sodium chloride 0.9%    Review of patient's allergies indicates:   Allergen Reactions    Bee sting [allergen ext-venom-honey bee]      Rash      Grass pollen-bermuda, standard      rash       Objective:     Vital Signs (Most Recent):  Temp: 97.8 °F (36.6 °C) (12/06/18 0438)  Pulse: 95 (12/06/18 0438)  Resp: 20 (12/06/18 0438)  BP: 139/78 (12/06/18 0438)  SpO2: 97 % (12/06/18 0438) Vital Signs (24h Range):  Temp:  [97.4 °F (36.3 °C)-98.3 °F (36.8 °C)] 97.8 °F (36.6 °C)  Pulse:  [81-95] 95  Resp:  [16-20] 20  SpO2:  [95 %-100 %] 97 %  BP: (126-156)/(77-84) 139/78     Weight: 100.5 kg (221 lb 9 oz)  Body mass index is 33.69 kg/m².    Intake/Output - Last 3 Shifts       12/04 0700 - 12/05 0659 12/05 0700 - 12/06 0659    P.O. 1200     I.V. (mL/kg) 3037.5 (30.2) 2812.5 (28)    Total Intake(mL/kg) 4237.5 (42.2) 2812.5 (28)    Urine (mL/kg/hr) 600 (0.2) 500 (0.2)    Stool 0     Total Output 600 500    Net +3637.5 +2312.5          Urine Occurrence 4 x 1 x    Stool Occurrence 1 x              Physical Exam:   Constitutional: She is oriented to person, place, and time. She appears well-developed and well-nourished. No distress.    HENT:   Head: Normocephalic and atraumatic.       Pulmonary/Chest: Effort normal. No respiratory distress.        Abdominal: Soft. She exhibits no distension. There is  no tenderness. There is no rebound and no guarding.             Musculoskeletal: Moves all extremeties.       Neurological: She is alert and oriented to person, place, and time.    Skin: Skin is warm and dry. She is not diaphoretic.    Psychiatric:   Flat affect, quiet/hoarse voice       Lines/Drains/Airways     Peripheral Intravenous Line                 Peripheral IV - Single Lumen 12/02/18 1920 Left Forearm 3 days         Peripheral IV - Single Lumen 12/02/18 2107 Right Hand 3 days                Laboratory:  BMP:   Recent Labs   Lab 12/04/18  1520 12/05/18  0714 12/05/18  1605   * 132* 137*    140 138   K 3.5 3.7 3.8    103 99   CO2 29 29 32*   BUN 8 7 6   CREATININE 1.0 0.9 1.0   CALCIUM 8.0* 8.0* 8.5*   MG 1.9 1.0* 1.5*    and CBC:   Recent Labs   Lab 12/05/18  0714 12/06/18  0500   WBC 3.25* 3.55*   HGB 10.5* 9.9*   HCT 31.8* 31.1*   * 120*     Mag: .7 > .9 > 1.1>1.5  Awaiting am labs    Diagnostic Results:  EEG normal per neurology note  Repeat MRI normal

## 2018-12-06 NOTE — CARE UPDATE
[facility]    HOME HEALTH ORDERS  FACE TO FACE ENCOUNTER    Patient Name: Edita Riley  YOB: 1965    PCP: Hammad Lackey MD   [facility] [address] [facility] [place] [address]  PCP Phone Number: [phone]  PCP Fax: [phone]    Encounter Date: 12/06/2018    Admit to Home Health    Diagnoses:  Active Hospital Problems    Diagnosis  POA    *Seizure-like activity [R56.9]  Yes    Stroke [I63.9]  Yes    Electrolyte disorder [E87.8]  Yes    Metastatic squamous cell carcinoma to lymph node [C77.9]  Yes    Gastroesophageal reflux disease with esophagitis [K21.0]  Yes    Severe episode of recurrent major depressive disorder, with psychotic features [F33.3]  Yes     Patient with 5 year history of depression, currently at its worst       Essential hypertension [I10]  Yes      Resolved Hospital Problems   No resolved problems to display.       Future Appointments   Date Time Provider Department Center   12/13/2018  9:00 AM Enoc Martinez, PhD [facility]   1/14/2019 10:15 AM Audrey Mustafa MD [facility] PCW     Follow-up Information     Refugio Lemon MD.    Specialty:  Gynecologic Oncology  Why:  As scheduled by the clinic  Contact information:  [address]  [facility] [address]  [phone]                     I have seen and examined this patient face to face today. My clinical findings that support the need for the home health skilled services and home bound status are the following:  Weakness/numbness causing balance and gait disturbance due to Weakness/Debility and Malignancy/Cancer making it taxing to leave home.    Allergies:  Review of patient's allergies indicates:   Allergen Reactions    Bee sting [allergen ext-venom-honey bee]      Rash      Grass pollen-bermuda, standard      rash       Diet: regular diet    Activities: activity as tolerated    Nursing:   SN to complete comprehensive assessment including  routine vital signs. Instruct on disease process and s/s of complications to report to MD. Review/verify medication list sent home with the patient at time of discharge  and instruct patient/caregiver as needed. Frequency may be adjusted depending on start of care date.    Notify MD if SBP > 160 or < 90; DBP > 90 or < 50; HR > 120 or < 50; Temp > 101; Other:   Altered mental status, uncontrolled pain      CONSULTS:    Physical Therapy to evaluate and treat. Evaluate for home safety and equipment needs; Establish/upgrade home exercise program. Perform / instruct on therapeutic exercises, gait training, transfer training, and Range of Motion.  Occupational Therapy to evaluate and treat. Evaluate home environment for safety and equipment needs. Perform/Instruct on transfers, ADL training, ROM, and therapeutic exercises.  Speech Therapy  to evaluate and treat for  Language and Cognition.    MISCELLANEOUS CARE:  N/A    WOUND CARE ORDERS  n/a      Medications: Review discharge medications with patient and family and provide education.      Current Discharge Medication List      CONTINUE these medications which have NOT CHANGED    Details   arm brace (NEOPRENE WRIST SPLINT SUPPORT) Misc 1 Units by Misc.(Non-Drug; Combo Route) route every evening.  Qty: 1 each, Refills: 0      cholecalciferol, vitamin D3, 2,000 unit Cap Take 1 capsule by mouth once daily.      conjugated estrogens (PREMARIN) vaginal cream Place 0.5 g vaginally twice a week.  Qty: 45 g, Refills: 1    Associated Diagnoses: Vaginal atrophy      cyclobenzaprine (FLEXERIL) 10 MG tablet TAKE 1 TABLET (10 MG TOTAL) BY MOUTH NIGHTLY AS NEEDED FOR MUSCLE SPASMS.  Refills: 1      diphenoxylate-atropine 2.5-0.025 mg (LOMOTIL) 2.5-0.025 mg per tablet Take 1 after each bowel movement. Up to 8 per day.  Qty: 30 tablet, Refills: 1    Associated Diagnoses: Diarrhea due to malabsorption      DULoxetine (CYMBALTA) 60 MG capsule TAKE 1 CAPSULE (60 MG TOTAL) BY MOUTH ONCE  DAILY.  Qty: 90 capsule, Refills: 3      estradiol (ESTRACE) 1 MG tablet Take 1 mg by mouth once daily.  Refills: 11      gabapentin (NEURONTIN) 300 MG capsule TAKE 1 CAPSULE (300 MG TOTAL) BY MOUTH 2 (TWO) TIMES DAILY.  Qty: 180 capsule, Refills: 3      lisinopril 10 MG tablet TAKE 1 TABLET (10 MG TOTAL) BY MOUTH ONCE DAILY.  Qty: 90 tablet, Refills: 3      magnesium oxide (MAG-OX) 400 mg (241.3 mg magnesium) tablet Take 1 tablet (400 mg total) by mouth once daily.  Qty: 30 tablet, Refills: 3    Associated Diagnoses: Hypomagnesemia      meclizine (ANTIVERT) 25 mg tablet Take 1 tablet (25 mg total) by mouth 3 (three) times daily as needed for Dizziness.  Qty: 30 tablet, Refills: 6    Associated Diagnoses: Vertigo      omeprazole (PRILOSEC) 40 MG capsule Take 1 capsule (40 mg total) by mouth once daily.  Qty: 90 capsule, Refills: 3    Associated Diagnoses: Gastroesophageal reflux disease with esophagitis      oxyCODONE-acetaminophen (PERCOCET) 5-325 mg per tablet Take 1 tablet by mouth every 4 (four) hours as needed.  Qty: 28 tablet, Refills: 0      traMADol (ULTRAM) 50 mg tablet Take 50 mg by mouth every 6 (six) hours as needed for Pain.      traZODone (DESYREL) 50 MG tablet Take 1 tablet (50 mg total) by mouth every evening.  Qty: 90 tablet, Refills: 3    Associated Diagnoses: Primary insomnia             I certify that this patient is confined to her home and needs physical therapy, speech therapy and occupational therapy.    Pelon Burden MD  PGY2, OBN  [facility]

## 2018-12-06 NOTE — ASSESSMENT & PLAN NOTE
--underwent stroke eval per [facility] neuro: low suspicion for stroke  --episode may be related to seizure: neurology on board, appreciate recs  ----EEG performed - normal per neuro note  ----lacosamide dc'ed  ----repeat MRI w ctst unremarkable  --continuing electrolyte replacement PRN  --s/p speech eval: regular diet and PO meds okay - rec outpatient speech therapy  --PT/OT consult: rec outpatient therapy

## 2018-12-06 NOTE — PLAN OF CARE
Problem: Patient Care Overview  Goal: Plan of Care Review  Outcome: Ongoing (interventions implemented as appropriate)  Mrs. Riley is AAO, with a flat affect. She denies pain, SOB, and N/V. No seizure activity noted this shift. D5 1/2 with 20 mEq KCl infusing via pump to PIV at 125 ml/hr. Bed in lowest position, rails up x 2, wheels locked, call bell within reach, patient reminded to call nurse for assistance prior to attempting to get out of bed. Patient verbalized understanding and agreement.

## 2018-12-06 NOTE — TELEPHONE ENCOUNTER
----- Message from Parul Talbert RN sent at 12/6/2018  8:20 AM CST -----  Contact: Case Management Parul Talbert RN  Patient has been seen inpatient by Dr. Villarreal and Kandice Christianson PA-C. Plans for patient to discharging today. Patient needs a follow up appt with Neurology outpatient. Thank you!
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
## 2018-12-06 NOTE — PROGRESS NOTES
[facility]  Gynecologic Oncology  Progress Note      Patient Name: Edita Riley  MRN: [id number]  Admission Date: 12/2/2018  Hospital Length of Stay: 4 days  Attending Provider: Refugio Lemon MD  Primary Care Provider: Hammad Lackey MD  Principal Problem: Seizure-like activity    Follow-up For: * No surgery found *  Post-Operative Day:    Subjective:      History of Present Illness:  Pt is a 53 year old female with metastatic cervical cancer who presented to the ED with aphasia and hypertonic extremities. Per patient history, she was sitting at home and had sudden onset fist clenching/muscle spasms and was unable to speak. Says the symptoms are starting to improve but still present - speech continues to be slurred. In ED, stroke code was called - imaging showed no evidence of stroke. Labs did show low magnesium, calcium, and potassium, however. Gyn Onc was consulted for further management. Pt reports that nothing like this has happened to her before. Is on chemotherapy and radiation for her recurrent cancer. (She is s/p surgery in 2015.) S/p 4 cycles of cisplatin (last 11/28.) Patient has experienced vomiting for the past few days. Has not been able to eat/drink much. Mild abdominal pain. No vaginal bleeding. Should be noted that patient was referred to social work last week for concerns of self-harm behavior.     Hospital Course:  12/02/2018 Admitted to gyn onc service for seizure-like activity. [facility] neuro consult ruled out stroke. Gen neuro consult placed. Started on lacosamide per [facility] neuro recs. Correcting electrolyte abnormalities.   12/03/2018 Pt reports some improvement in symptoms but continued difficulty with speech and hypertonia. On tele monitoring. Was evaluated by neuro and heme/onc psych. Desires psychiatry consult to discuss medications. EEG performed, awaiting results.  12/04/2018 kentrell neff'ed per neuro recs. Am lyte replacement - pm labs wnl.   12/05/2018 Starting  abilify per psych recs. MRI normal. PT/OT consults completed - rec outpatient therapy.  12/06/2018 Stable for dc. Home today w outpatient f/u in neuro, psychology, psychiatry, speech, PT, OT.    Interval History: Pt reporting migraine today. Otherwise no complaints. Tolerating diet. Some pain/weakness in legs but improved. No nausea/vomiting.     Scheduled Meds:   ARIPiprazole  2 mg Oral Daily    DULoxetine  60 mg Oral Daily    gabapentin  300 mg Oral BID    ibuprofen  800 mg Oral Once    lisinopril  10 mg Oral Daily    magnesium oxide  400 mg Oral BID    pantoprazole  40 mg Oral Daily    traZODone  50 mg Oral QHS     Continuous Infusions:   dextrose 5 % and 0.45 % NaCl with KCl 20 mEq 125 mL/hr at 12/06/18 0459     PRN Meds:acetaminophen, HYDROcodone-acetaminophen, HYDROcodone-acetaminophen, lorazepam, ondansetron, sodium chloride 0.9%    Review of patient's allergies indicates:   Allergen Reactions    Bee sting [allergen ext-venom-honey bee]      Rash      Grass pollen-bermuda, standard      rash       Objective:     Vital Signs (Most Recent):  Temp: 97.8 °F (36.6 °C) (12/06/18 0438)  Pulse: 95 (12/06/18 0438)  Resp: 20 (12/06/18 0438)  BP: 139/78 (12/06/18 0438)  SpO2: 97 % (12/06/18 0438) Vital Signs (24h Range):  Temp:  [97.4 °F (36.3 °C)-98.3 °F (36.8 °C)] 97.8 °F (36.6 °C)  Pulse:  [81-95] 95  Resp:  [16-20] 20  SpO2:  [95 %-100 %] 97 %  BP: (126-156)/(77-84) 139/78     Weight: 100.5 kg (221 lb 9 oz)  Body mass index is 33.69 kg/m².    Intake/Output - Last 3 Shifts       12/04 0700 - 12/05 0659 12/05 0700 - 12/06 0659    P.O. 1200     I.V. (mL/kg) 3037.5 (30.2) 2812.5 (28)    Total Intake(mL/kg) 4237.5 (42.2) 2812.5 (28)    Urine (mL/kg/hr) 600 (0.2) 500 (0.2)    Stool 0     Total Output 600 500    Net +3637.5 +2312.5          Urine Occurrence 4 x 1 x    Stool Occurrence 1 x              Physical Exam:   Constitutional: She is oriented to person, place, and time. She appears well-developed and  well-nourished. No distress.    HENT:   Head: Normocephalic and atraumatic.       Pulmonary/Chest: Effort normal. No respiratory distress.        Abdominal: Soft. She exhibits no distension. There is no tenderness. There is no rebound and no guarding.             Musculoskeletal: Moves all extremeties.       Neurological: She is alert and oriented to person, place, and time.    Skin: Skin is warm and dry. She is not diaphoretic.    Psychiatric:   Flat affect, quiet/hoarse voice       Lines/Drains/Airways     Peripheral Intravenous Line                 Peripheral IV - Single Lumen 12/02/18 1920 Left Forearm 3 days         Peripheral IV - Single Lumen 12/02/18 2107 Right Hand 3 days                Laboratory:  BMP:   Recent Labs   Lab 12/04/18  1520 12/05/18  0714 12/05/18  1605   * 132* 137*    140 138   K 3.5 3.7 3.8    103 99   CO2 29 29 32*   BUN 8 7 6   CREATININE 1.0 0.9 1.0   CALCIUM 8.0* 8.0* 8.5*   MG 1.9 1.0* 1.5*    and CBC:   Recent Labs   Lab 12/05/18  0714 12/06/18  0500   WBC 3.25* 3.55*   HGB 10.5* 9.9*   HCT 31.8* 31.1*   * 120*     Mag: .7 > .9 > 1.1>1.5  Awaiting am labs    Diagnostic Results:  EEG normal per neurology note  Repeat MRI normal    Assessment/Plan:     * Seizure-like activity    --underwent stroke eval per [facility] neuro: low suspicion for stroke  --episode may be related to seizure: neurology on board, [facility]  ----EEG performed - normal per neuro note  ----lacosamide dc'ed  ----repeat MRI w ctst unremarkable  --continuing electrolyte replacement PRN  --s/p speech eval: regular diet and PO meds okay - [facility] outpatient speech therapy  --PT/OT consult: [facility] outpatient therapy     Electrolyte disorder    --Mg .7, Ca2+ 7.0 (corrected - 7.5), K 3.2 upon admission  --replacement ordered by ED physician  --resolved. Awaiting am labs to confirm.     Metastatic squamous cell carcinoma to lymph node    --h/o cervical cancer s/p surgery in 2015  --now on cisplatin (s/p  4 cycles, last 11/28) and radiation (M-F)  --no evidence of brain mets on imaging     Gastroesophageal reflux disease with esophagitis    --PO protonix daily     Severe episode of recurrent major depressive disorder, with psychotic features    --h/o suicide attempt, recent sw encounter for selfharm behavior  --death of daughter by suicide earlier this year  --seen by heme/onc psych -- reports suicidal ideation but no intent/plan  --psych on board  --starting abilify per psych recs  --continue home cymbalta, trazodone   --will need outpatient psychiatry follow up     Essential hypertension    --BP: (126-156)/(77-84) 139/78  --continue home lisinopril       VTE Risk Mitigation (From admission, onward)        Ordered     Place sequential compression device  Until discontinued      12/02/18 2111     IP VTE HIGH RISK PATIENT  Once      12/02/18 2111        DISPO: home today. Outpatient neuro, psychology, psychiatry, speech f/u. Home PT & OT.     Pelon Burden MD  Gynecologic Oncology  [facility]

## 2018-12-06 NOTE — ASSESSMENT & PLAN NOTE
Patient with long standing depression that is not well controlled on her current medication regimen.  Did report some mild benefits, but is not able to fully articulate them during my assessment.  I reviewed the risks vs benefits of various treatment options and she has elected to proceed with starting Abilify 2 mg to augment her Cymbalta and to address auditory hallucinations. She does not at this time meet legal criteria for an involuntary psychiatric hold and it is recommended that she follow up with an outpatient psychiatrist.  Patient is planned to be discharged today and thus will not make any adjustments to her regimen.  However could also consider increasing her Cymbalta to 90 mg as an outpatient when she follows up with [facility].    Recommendations:  1) Continue Abilify 2 mg daily  2) Continue Cymbalta dose at 60 mg for now.  3) Consider repeat EKG. Last QTc was 473ms   4) Continue psychotherapy as outpatient.  5) Follow up with psychiatry as outpatient. [facility] contact information provided at bedside.

## 2018-12-06 NOTE — DISCHARGE SUMMARY
[facility]  Gynecologic Oncology  Discharge Summary    Patient Name: Edita Riley  MRN: [id number]  Admission Date: 12/2/2018  Hospital Length of Stay: 4 days  Discharge Date and Time:  12/06/2018 12:10 PM  Attending Physician: Refugio Lemon MD   Discharging Provider: Pelon Burden MD  Primary Care Provider: Hammad Lackey MD    HPI:   Pt is a 53 year old female with metastatic cervical cancer who presented to the ED with aphasia and hypertonic extremities. Per patient history, she was sitting at home and had sudden onset fist clenching/muscle spasms and was unable to speak. Says the symptoms are starting to improve but still present - speech continues to be slurred. In ED, stroke code was called - imaging showed no evidence of stroke. Labs did show low magnesium, calcium, and potassium, however. Gyn Onc was consulted for further management. Pt reports that nothing like this has happened to her before. Is on chemotherapy and radiation for her recurrent cancer. (She is s/p surgery in 2015.) S/p 4 cycles of cisplatin (last 11/28.) Patient has experienced vomiting for the past few days. Has not been able to eat/drink much. Mild abdominal pain. No vaginal bleeding. Should be noted that patient was referred to social work last week for concerns of self-harm behavior.     Hospital Course:  12/02/2018 Admitted to gyn onc service for seizure-like activity. [facility] neuro consult ruled out stroke. Gen neuro consult placed. Started on lacosamide per vas neuro recs. Correcting electrolyte abnormalities.   12/03/2018 Pt reports some improvement in symptoms but continued difficulty with speech and hypertonia. On tele monitoring. Was evaluated by neuro and heme/onc psych. Desires psychiatry consult to discuss medications. EEG performed, awaiting results.  12/04/2018 kentrell neff'ed per neuro recs. Am lyte replacement - pm labs wnl.   12/05/2018 Starting abilify per psych recs. MRI normal. PT/OT consults  completed - rec outpatient therapy.  12/06/2018 Stable for dc. Home today w outpatient f/u in neuro, psychology, psychiatry, speech, PT, OT.    * No surgery found *     Consults (From admission, onward)        Status Ordering Provider     Inpatient consult to Gynecologic Oncology  Once     Provider:  (Not yet assigned)    JACQUI Ramirez     Inpatient consult to Hematology/Oncology Psychology  Once     Provider:  (Not yet assigned)    Completed PRAMOD RADER     Inpatient consult to Neurology  Once     Provider:  (Not yet assigned)    Completed PRAMOD RADER     Inpatient consult to Psychiatry  Once     Provider:  (Not yet assigned)    Completed PRAMOD RADER     Inpatient consult to Vascular (Stroke) Neurology  Once     Provider:  (Not yet assigned)    Completed JACQUI DORSEY          Significant Diagnostic Studies: Labs:   CMP   Recent Labs   Lab 12/05/18  0714 12/05/18  1605 12/06/18  0500    138 138   K 3.7 3.8 3.9    99 100   CO2 29 32* 30*   * 137* 154*   BUN 7 6 9   CREATININE 0.9 1.0 0.8   CALCIUM 8.0* 8.5* 8.1*   PROT 6.3  --  6.3   ALBUMIN 2.9*  --  2.9*   BILITOT 0.3  --  0.3   ALKPHOS 131  --  136*   AST 22  --  18   ALT 47*  --  43   ANIONGAP 8 7* 8   ESTGFRAFRICA >60.0 >60.0 >60.0   EGFRNONAA >60.0 >60.0 >60.0    and CBC   Recent Labs   Lab 12/05/18  0714 12/06/18  0500   WBC 3.25* 3.55*   HGB 10.5* 9.9*   HCT 31.8* 31.1*   * 120*       Pending Diagnostic Studies:     Procedure Component Value Units Date/Time    Paraneoplastic Autoantibody Evaulation, Serum [[id number]] Collected:  12/04/18 0844    Order Status:  Sent Lab Status:  In process Updated:  12/04/18 0912    Specimen:  Blood         Final Active Diagnoses:    Diagnosis Date Noted POA    PRINCIPAL PROBLEM:  Seizure-like activity [R56.9] 12/02/2018 Yes    Stroke [I63.9] 12/02/2018 Yes    Electrolyte disorder [E87.8] 12/02/2018 Yes    Metastatic squamous cell carcinoma to lymph node [C77.9]  10/11/2018 Yes    Gastroesophageal reflux disease with esophagitis [K21.0] 11/16/2017 Yes    Severe episode of recurrent major depressive disorder, with psychotic features [F33.3] 09/16/2015 Yes    Essential hypertension [I10] 05/04/2015 Yes      Problems Resolved During this Admission:        Does this patient meet criteria for extended DVT prophylaxis? No, because no surgery this admission    Discharged Condition: good    Disposition: Home or Self Care    Follow Up:  Follow-up Information     Refugio Lemon MD. Go on 12/14/2018.    Specialty:  Gynecologic Oncology  Why:  Follow-Up Appointment at 9:45 am  Contact information:  [address]  [facility] [address]  [phone]             Enoc Martinez, PhD. Go on 12/13/2018.    Specialty:  Psychology  Why:  Follow-Up Appointment at 9:00 am  Contact information:  [address]  [facility] [address]  [phone]             Audrey Mustafa MD. Go on 1/14/2019.    Specialty:  Internal Medicine  Why:  Follow-Up Appointment at 10:15 am  Contact information:  [address]  [facility] [address]  [phone]                 Patient Instructions:      Ambulatory Referral to Speech Therapy   Referral Priority: Urgent Referral Type: Speech Therapy   Referral Reason: Specialty Services Required   Requested Specialty: Speech Pathology   Number of Visits Requested: 1     Ambulatory referral to Outpatient Case Management   Referral Priority: Urgent Referral Type: Consultation   Referral Reason: Specialty Services Required   Number of Visits Requested: 1     Diet Adult Regular     Notify your health care provider if you experience any of the following:  temperature >100.4     Notify your health care provider if you experience any of the following:  persistent nausea and vomiting or diarrhea     Notify your health care provider if you experience any of the following:  severe uncontrolled pain     Notify your health care provider if you experience any  of the following:  persistent dizziness, light-headedness, or visual disturbances     Notify your health care provider if you experience any of the following:  increased confusion or weakness     Activity as tolerated     Medications:  Reconciled Home Medications:      Medication List      START taking these medications    ARIPiprazole 2 MG Tab  Commonly known as:  ABILIFY  Take 1 tablet (2 mg total) by mouth once daily.     potassium chloride SA 20 MEQ tablet  Commonly known as:  K-DUR,KLOR-CON  Take 1 tablet by mouth once daily.        CHANGE how you take these medications    magnesium oxide 400 mg (241.3 mg magnesium) tablet  Commonly known as:  MAG-OX  Take 1 tablet (400 mg total) by mouth 3 (three) times daily.  What changed:  when to take this        CONTINUE taking these medications    arm brace Misc  Commonly known as:  NEOPRENE WRIST SPLINT SUPPORT  1 Units by Misc.(Non-Drug; Combo Route) route every evening.     cholecalciferol (vitamin D3) 2,000 unit Cap  Commonly known as:  VITAMIN D3  Take 1 capsule by mouth once daily.     cyclobenzaprine 10 MG tablet  Commonly known as:  FLEXERIL  TAKE 1 TABLET (10 MG TOTAL) BY MOUTH NIGHTLY AS NEEDED FOR MUSCLE SPASMS.     diphenoxylate-atropine 2.5-0.025 mg 2.5-0.025 mg per tablet  Commonly known as:  LOMOTIL  Take 1 after each bowel movement. Up to 8 per day.     DULoxetine 60 MG capsule  Commonly known as:  CYMBALTA  TAKE 1 CAPSULE (60 MG TOTAL) BY MOUTH ONCE DAILY.     gabapentin 300 MG capsule  Commonly known as:  NEURONTIN  TAKE 1 CAPSULE (300 MG TOTAL) BY MOUTH 2 (TWO) TIMES DAILY.     lisinopril 10 MG tablet  TAKE 1 TABLET (10 MG TOTAL) BY MOUTH ONCE DAILY.     meclizine 25 mg tablet  Commonly known as:  ANTIVERT  Take 1 tablet (25 mg total) by mouth 3 (three) times daily as needed for Dizziness.     omeprazole 40 MG capsule  Commonly known as:  PRILOSEC  Take 1 capsule (40 mg total) by mouth once daily.     oxyCODONE-acetaminophen 5-325 mg per  tablet  Commonly known as:  PERCOCET  Take 1 tablet by mouth every 4 (four) hours as needed.     traMADol 50 mg tablet  Commonly known as:  ULTRAM  Take 50 mg by mouth every 6 (six) hours as needed for Pain.     traZODone 50 MG tablet  Commonly known as:  DESYREL  Take 1 tablet (50 mg total) by mouth every evening.        STOP taking these medications    conjugated estrogens vaginal cream  Commonly known as:  PREMARIN     estradiol 1 MG tablet  Commonly known as:  ESTRDB Burden MD  Gynecologic Oncology  [facility]

## 2018-12-06 NOTE — ASSESSMENT & PLAN NOTE
--Mg .7, Ca2+ 7.0 (corrected - 7.5), K 3.2 upon admission  --replacement ordered by ED physician  --resolved. Awaiting am labs to confirm.

## 2018-12-06 NOTE — PLAN OF CARE
Discharge and follow-up instructions to be completed by the bedside nurse.    Patient has Medicaid. Plans for outpatient PT/OT. Outpatient case management referral placed for follow up with psychiatrist appt. Patient is scheduled to see her psychologist on 12/13/18. Patient given information for [facility] for psychiatry (for patient's with Medicaid) by the psychiatrist that saw her inpatient this admit. Neurology follow up requested. Patient scheduled to follow up with Dr. Lemon. Outpatient referral to speech therapy placed as well.    Future Appointments   Date Time Provider Department Center   12/13/2018  9:00 AM Enoc Martinez, PhD [facility] CAN [facility]   12/14/2018  9:45 AM Refugio Lemon MD [facility] GYN ONC [facility]   1/14/2019 10:15 AM Audrey Mustafa MD [facility] IM [facility]      12/06/18 1231   Final Note   Assessment Type Final Discharge Note   Anticipated Discharge Disposition Home   What phone number can be called within the next 1-3 days to see how you are doing after discharge? ([phone])   Hospital Follow Up  Appt(s) scheduled? Yes   Discharge plans and expectations educations in teach back method with documentation complete? Yes

## 2018-12-06 NOTE — SUBJECTIVE & OBJECTIVE
"Interval History: see hospital course    Family History     Problem Relation (Age of Onset)    Breast cancer Maternal Aunt    Cancer Father, Mother    Cervical cancer Mother    Colon cancer Father    Drug abuse Daughter, Daughter    Esophageal cancer Father    Heart disease Sister, Maternal Grandmother    Suicide Daughter        Tobacco Use    Smoking status: Never Smoker    Smokeless tobacco: Never Used   Substance and Sexual Activity    Alcohol use: No     Alcohol/week: 0.0 oz    Drug use: No    Sexual activity: Yes     Partners: Male     Birth control/protection: None     Comment:  19 years since 1999     Psychotherapeutics (From admission, onward)    Start     Stop Route Frequency Ordered    12/05/18 0900  ARIPiprazole tablet 2 mg      -- Oral Daily 12/05/18 0606    12/03/18 2100  traZODone tablet 50 mg      -- Oral Nightly 12/03/18 1353    12/03/18 1500  DULoxetine DR capsule 60 mg      -- Oral Daily 12/03/18 1353    12/02/18 2326  lorazepam injection 0.5 mg      -- IV 3 times daily PRN 12/02/18 2227           Review of Systems  Objective:     Vital Signs (Most Recent):  Temp: 98.7 °F (37.1 °C) (12/06/18 0800)  Pulse: 95 (12/06/18 0800)  Resp: 20 (12/06/18 0800)  BP: (!) 146/79 (12/06/18 0800)  SpO2: 95 % (12/06/18 0800) Vital Signs (24h Range):  Temp:  [97.4 °F (36.3 °C)-98.7 °F (37.1 °C)] 98.7 °F (37.1 °C)  Pulse:  [81-96] 95  Resp:  [18-20] 20  SpO2:  [95 %-100 %] 95 %  BP: (126-156)/(77-84) 146/79     Height: 5' 8" (172.7 cm)  Weight: 100.5 kg (221 lb 9 oz)  Body mass index is 33.69 kg/m².      Intake/Output Summary (Last 24 hours) at 12/6/2018 0835  Last data filed at 12/6/2018 0400  Gross per 24 hour   Intake 2812.5 ml   Output 500 ml   Net 2312.5 ml       Physical Exam   Psychiatric:   Appearance: age appropriate, overweight, and appropriately dressed  Level of Consciousness: alert  Behavior/Cooperation: normal, friendly and cooperative, psychomotor retardation, eye contact normal   Speech: " "normal pitch, normal volume, slowed, mumbled at times  Language: english, fluid  Orientation: person, place, situation, time/date  Attention Span/Concentration: able to report months of the year in reverse however delayed processing speed was noted.  Memory: Remote intact and Recent intact  Mood: "fine"  Affect: restricted, but with improving range approaching euthymia  Thought Process: linear, normal and logical  Associations: no loosening of associations  Thought Content: Denies SI/HI/AH/VH does not appear to be responding to internal stimuli at this time.  Fund of Knowledge: Intact  Insight: fair  Judgment: improving         Significant Labs:   Last 24 Hours:   Recent Lab Results       12/06/18  0500   12/05/18  1605        Immature Granulocytes 0.3       Immature Grans (Abs) 0.01  Comment:  Mild elevation in immature granulocytes is non specific and   can be seen in a variety of conditions including stress response,   acute inflammation, trauma and pregnancy. Correlation with other   laboratory and clinical findings is essential.         Albumin 2.9       Alkaline Phosphatase 136       ALT 43       Anion Gap 8 7     AST 18       Baso # 0.01       Basophil% 0.3       Total Bilirubin 0.3  Comment:  For infants and newborns, interpretation of results should be based  on gestational age, weight and in agreement with clinical  observations.  Premature Infant recommended reference ranges:  Up to 24 hours.............<8.0 mg/dL  Up to 48 hours............<12.0 mg/dL  3-5 days..................<15.0 mg/dL  6-29 days.................<15.0 mg/dL         BUN, Bld 9 6     Calcium 8.1 8.5     Chloride 100 99     CO2 30 32     Creatinine 0.8 1.0     Differential Method Automated       eGFR if  >60.0 >60.0     eGFR if non  >60.0  Comment:  Calculation used to obtain the estimated glomerular filtration  rate (eGFR) is the CKD-EPI equation.    >60.0  Comment:  Calculation used to obtain the " estimated glomerular filtration  rate (eGFR) is the CKD-EPI equation.        Eos # 0.0       Eosinophil% 0.8       Glucose 154 137     Gran # (ANC) 2.7       Gran% 75.5       Hematocrit 31.1       Hemoglobin 9.9       Lymph # 0.3       Lymph% 8.2       Magnesium 1.2 1.5     MCH 27.1       MCHC 31.8       MCV 85       Mono # 0.5       Mono% 14.9       MPV 9.6       nRBC 0       Platelets 120       Potassium 3.9 3.8     Total Protein 6.3       RBC 3.65       RDW 14.1       Sodium 138 138     WBC 3.55             Significant Imaging: I have reviewed all pertinent imaging results/findings within the past 24 hours.

## 2018-12-06 NOTE — TELEPHONE ENCOUNTER
I spoke with the patient's , Mr. Cueva and he says they are still at the hospital and he wants to wait until she is actually discharged and see when Ms. Kohler would be up to coming to the visit. So they will call back to schedule the appointment.

## 2018-12-06 NOTE — PROGRESS NOTES
Attempted to call pt. To discuss outpt. PT but pt. Off floor getting radiation treatment. Spoke with pts.  and he states that he will be able to provide pt. Transportation to PT appointments if needed.  will speak with pt. About PT and will await call from office about scheduling appt. No other needs identified.

## 2018-12-07 PROCEDURE — 77386 HC IMRT, COMPLEX: CPT | Performed by: RADIOLOGY

## 2018-12-07 PROCEDURE — 77387 GUIDANCE FOR RADJ TX DLVR: CPT | Mod: ,,, | Performed by: RADIOLOGY

## 2018-12-07 NOTE — NURSING
Pt discharged per MD order. No needs assessed at this time. Pt verbalized understanding of discharge instructions. PIV removed, catheter tip intact. Pt wheeled off of unit by .

## 2018-12-10 PROCEDURE — 77417 THER RADIOLOGY PORT IMAGE(S): CPT | Performed by: RADIOLOGY

## 2018-12-10 PROCEDURE — 77386 HC IMRT, COMPLEX: CPT | Performed by: RADIOLOGY

## 2018-12-11 PROCEDURE — 77386 HC IMRT, COMPLEX: CPT | Performed by: RADIOLOGY

## 2018-12-12 ENCOUNTER — DOCUMENTATION ONLY (OUTPATIENT)
Dept: RADIATION ONCOLOGY | Facility: CLINIC | Age: 53
End: 2018-12-12

## 2018-12-12 PROCEDURE — 77336 RADIATION PHYSICS CONSULT: CPT | Performed by: RADIOLOGY

## 2018-12-12 PROCEDURE — 77386 HC IMRT, COMPLEX: CPT | Performed by: RADIOLOGY

## 2018-12-13 ENCOUNTER — TELEPHONE (OUTPATIENT)
Dept: GYNECOLOGIC ONCOLOGY | Facility: CLINIC | Age: 53
End: 2018-12-13

## 2018-12-13 ENCOUNTER — OFFICE VISIT (OUTPATIENT)
Dept: PSYCHIATRY | Facility: CLINIC | Age: 53
End: 2018-12-13
Payer: MEDICAID

## 2018-12-13 DIAGNOSIS — F33.3 SEVERE EPISODE OF RECURRENT MAJOR DEPRESSIVE DISORDER, WITH PSYCHOTIC FEATURES: Primary | ICD-10-CM

## 2018-12-13 PROCEDURE — 99999 PR PBB SHADOW E&M-EST. PATIENT-LVL II: CPT | Mod: PBBFAC,,, | Performed by: PSYCHOLOGIST

## 2018-12-13 PROCEDURE — 90834 PSYTX W PT 45 MINUTES: CPT | Mod: AH,HB,S$PBB, | Performed by: PSYCHOLOGIST

## 2018-12-13 PROCEDURE — 77386 HC IMRT, COMPLEX: CPT | Performed by: RADIOLOGY

## 2018-12-13 PROCEDURE — 99212 OFFICE O/P EST SF 10 MIN: CPT | Mod: PBBFAC,25 | Performed by: PSYCHOLOGIST

## 2018-12-13 PROCEDURE — 77387 GUIDANCE FOR RADJ TX DLVR: CPT | Mod: ,,, | Performed by: RADIOLOGY

## 2018-12-13 PROCEDURE — 90834 PSYTX W PT 45 MINUTES: CPT | Mod: PBBFAC | Performed by: PSYCHOLOGIST

## 2018-12-13 NOTE — PROGRESS NOTES
PSYCHO-ONCOLOGY NOTE/ Individual Psychotherapy     Date: 12/13/2018   [facility]        Therapeutic Intervention: Met with patient.  Outpatient - Behavior modifying psychotherapy 45 min - CPT code 31442    The patient's last visit with me was on 10/17/2018.    Problem list  Patient Active Problem List   Diagnosis    Osteoarthritis of back    Hiatal hernia    Essential hypertension    Lower extremity pain, diffuse    Weakness of both lower extremities    Impaired functional mobility, balance, gait, and endurance    Schatzki's ring    Colon polyp    Internal hemorrhoids    Cardiovascular event risk -- low    Hemifacial spasm    Severe episode of recurrent major depressive disorder, with psychotic features    Fibromyalgia    Facial palsy    Blood in stool    Sleep stage dysfunction    Carpal tunnel syndrome on right    Weakness    Difficulty walking    Gastroesophageal reflux disease with esophagitis    Abnormal CT of the abdomen    Lymphadenopathy    History of cervical cancer    Metastatic squamous cell carcinoma to lymph node    Severe recurrent major depression with psychotic features    Generalized anxiety disorder    PTSD (post-traumatic stress disorder)    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 drug    Diarrhea due to malabsorption    Hypomagnesemia    Hypomagnesemia    Seizure-like activity    Stroke    Electrolyte disorder       Chief complaint/reason for encounter: depression and anxiety   Met with patient to evaluate psychosocial adaptation to diagnosis/treatment/survivorship of metastatic cervical cancer    Current Medications  Current Outpatient Medications   Medication    ARIPiprazole (ABILIFY) 2 MG Tab    arm brace (NEOPRENE WRIST SPLINT SUPPORT) Misc    cholecalciferol, vitamin D3, 2,000 unit Cap    cyclobenzaprine (FLEXERIL) 10 MG tablet    diphenoxylate-atropine 2.5-0.025 mg (LOMOTIL) 2.5-0.025 mg per tablet    DULoxetine (CYMBALTA) 60 MG capsule     gabapentin (NEURONTIN) 300 MG capsule    lisinopril 10 MG tablet    magnesium oxide (MAG-OX) 400 mg (241.3 mg magnesium) tablet    meclizine (ANTIVERT) 25 mg tablet    omeprazole (PRILOSEC) 40 MG capsule    oxyCODONE-acetaminophen (PERCOCET) 5-325 mg per tablet    potassium chloride SA (K-DUR,KLOR-CON) 20 MEQ tablet    traMADol (ULTRAM) 50 mg tablet    traZODone (DESYREL) 50 MG tablet     No current facility-administered medications for this visit.        Objective:  Edita Riley arrived promptly for the session.  Ms. Riley was independently ambulatory at the time of session. The patient was fully cooperative throughout the session.  Appearance: age appropriate, casually  dressed, adequately  groomed  Behavior/Cooperation: friendly and cooperative  Speech: inflexionless , slowed  and slightly slurred  Mood: dysthymic  Affect: blunted  Thought Process: goal-directed, logical  Thought Content: normal,  No delusions or paranoia; did not appear to be responding to internal stimuli during the session  Orientation: grossly intact  Memory: Grossly intact  Attention Span/Concentration: Attends to session without distraction; reports no difficulty  Fund of Knowledge: average  Estimate of Intelligence: average from verbal skills and history  Cognition: grossly intact  Insight: patient has awareness of illness; good insight into own behavior and behavior of others  Judgment: the patient's behavior is adequate to circumstances    Interval history and content of current session: Patient discussed coping with side effects of radiation treatment.  Discussed current adaptation to disease and treatment status and family's adaptation to disease and treatment status. Reports to be coping in an adequate manner. She reports her family is coping well.     Discussed medication adherence with patient. She is not taking her Abilify regularly.  Discussed importance of routine adherence to this medication. She has  "noticed a decrease in incidence of auditory hallucinations since starting the Abilify.  Patient also discussed struggles to "keep food down" and beliefs about inability to eat certain types of food and drinks. Patient is amenable to contact with RD.     Identified and evaluated psychosocial and environmental stressors secondary to diagnosis and treatment.  Examined proactive behaviors that may be implemented to minimize or ameliorate psychosocial stressors secondary to diagnosis and treatment.     Risk parameters:   Patient reports no homicidal ideation  Patient reports no self-injurious behavior  Patient reports no violent behavior   Patient reports mild suicidal ideation without intent or plan   Safety needs:  None at this time      Verbal deficits: slight slurring, no word-finding difficulty during today's visit     Patient's response to intervention:The patient's response to intervention is accepting.     Progress toward goals and other mental status changes:  The patient's progress toward goals is fair .      Progress to date:Progress as Expected      Goals from last visit: Not attempted      Patient reported outcomes:  Distress Thermometer:   Distress Score    Distress Score: 8        Practical Problems Physical Problems                                                   Family Problems                                         Emotional Problems                                                         Spiritual/Religions Concerns               Other Problems             PHQ-9=  22; 26 at intake   QUOC-7= 18; 19 at intake      Client Strengths: good social support, commitment to wellness    Diagnosis:     ICD-10-CM ICD-9-CM   1. Severe episode of recurrent major depressive disorder, with psychotic features F33.3 296.34       · Treatment Plan:individual psychotherapy and consult psychiatrist for medication evaluation  (JPA)  · Target symptoms: depression, anxiety   · Why chosen therapy is appropriate versus " another modality: relevant to diagnosis, evidence based practice  · Outcome monitoring methods: self-report, checklist/rating scale  · Therapeutic intervention type: behavior modifying psychotherapy  · Prognosis: Fair      Behavioral goals:    Exercise:  Increase walking to combat radiation-related fatigue   Stress management:   Social engagement:   Nutrition:   Smoking Cessation:   Therapy:  Buy a pillbox to improve medication adherence    Call [facility] now for appt due to lag time in new patient visits    Return to clinic: 3 weeks     Length of Service (minutes direct face-to-face contact): 45    Enoc Martinez, PhD  [place] License #[id number]

## 2018-12-14 ENCOUNTER — TELEPHONE (OUTPATIENT)
Dept: GYNECOLOGIC ONCOLOGY | Facility: CLINIC | Age: 53
End: 2018-12-14

## 2018-12-14 ENCOUNTER — LAB VISIT (OUTPATIENT)
Dept: LAB | Facility: HOSPITAL | Age: 53
End: 2018-12-14
Attending: OBSTETRICS & GYNECOLOGY
Payer: MEDICAID

## 2018-12-14 ENCOUNTER — OFFICE VISIT (OUTPATIENT)
Dept: GYNECOLOGIC ONCOLOGY | Facility: CLINIC | Age: 53
End: 2018-12-14
Payer: MEDICAID

## 2018-12-14 VITALS
SYSTOLIC BLOOD PRESSURE: 138 MMHG | HEIGHT: 68 IN | DIASTOLIC BLOOD PRESSURE: 63 MMHG | WEIGHT: 220 LBS | BODY MASS INDEX: 33.34 KG/M2 | HEART RATE: 104 BPM

## 2018-12-14 DIAGNOSIS — E87.8 ELECTROLYTE DISORDER: ICD-10-CM

## 2018-12-14 DIAGNOSIS — E83.42 HYPOMAGNESEMIA: ICD-10-CM

## 2018-12-14 DIAGNOSIS — E87.8 ELECTROLYTE DISORDER: Primary | ICD-10-CM

## 2018-12-14 DIAGNOSIS — C77.9 METASTATIC SQUAMOUS CELL CARCINOMA TO LYMPH NODE: Primary | ICD-10-CM

## 2018-12-14 LAB
ALBUMIN SERPL BCP-MCNC: 3.3 G/DL
ALP SERPL-CCNC: 120 U/L
ALT SERPL W/O P-5'-P-CCNC: 57 U/L
ANION GAP SERPL CALC-SCNC: 10 MMOL/L
AST SERPL-CCNC: 38 U/L
BILIRUB SERPL-MCNC: 0.4 MG/DL
BUN SERPL-MCNC: 9 MG/DL
CA-I BLDV-SCNC: 0.87 MMOL/L
CALCIUM SERPL-MCNC: 7.6 MG/DL
CHLORIDE SERPL-SCNC: 104 MMOL/L
CO2 SERPL-SCNC: 28 MMOL/L
CREAT SERPL-MCNC: 1.2 MG/DL
EST. GFR  (AFRICAN AMERICAN): 58.8 ML/MIN/1.73 M^2
EST. GFR  (NON AFRICAN AMERICAN): 51 ML/MIN/1.73 M^2
GLUCOSE SERPL-MCNC: 127 MG/DL
MAGNESIUM SERPL-MCNC: 0.9 MG/DL
POTASSIUM SERPL-SCNC: 4 MMOL/L
PROT SERPL-MCNC: 7.1 G/DL
SODIUM SERPL-SCNC: 142 MMOL/L

## 2018-12-14 PROCEDURE — 77387 GUIDANCE FOR RADJ TX DLVR: CPT | Mod: ,,, | Performed by: RADIOLOGY

## 2018-12-14 PROCEDURE — 99214 OFFICE O/P EST MOD 30 MIN: CPT | Mod: S$PBB,,, | Performed by: OBSTETRICS & GYNECOLOGY

## 2018-12-14 PROCEDURE — 82330 ASSAY OF CALCIUM: CPT

## 2018-12-14 PROCEDURE — 77386 HC IMRT, COMPLEX: CPT | Performed by: RADIOLOGY

## 2018-12-14 PROCEDURE — 99213 OFFICE O/P EST LOW 20 MIN: CPT | Mod: PBBFAC | Performed by: OBSTETRICS & GYNECOLOGY

## 2018-12-14 PROCEDURE — 83735 ASSAY OF MAGNESIUM: CPT

## 2018-12-14 PROCEDURE — 80053 COMPREHEN METABOLIC PANEL: CPT

## 2018-12-14 PROCEDURE — 36415 COLL VENOUS BLD VENIPUNCTURE: CPT

## 2018-12-14 PROCEDURE — 99999 PR PBB SHADOW E&M-EST. PATIENT-LVL III: CPT | Mod: PBBFAC,,, | Performed by: OBSTETRICS & GYNECOLOGY

## 2018-12-14 NOTE — TELEPHONE ENCOUNTER
----- Message from Refugio Lemon MD sent at 12/14/2018  3:29 PM CST -----  LM for patient to call me.   Left message to start Tums 2 tablets a day and to increase mag oxide to 5 tablets a day. Repeat labs in  1 week. Order placed. Needs lab appt. Thank you.

## 2018-12-14 NOTE — PROGRESS NOTES
Subjective:       Patient ID: Edita Riley is a 53 y.o. female.    Chief Complaint: metastatic squamous cell carcinoma to lymph node and Follow-up (post op )    HPI     Patient comes in today for follow up. Recent hospitalization for electrolyte abnormalities and seizure like activity. And worsening depression. Electrolytes corrected. Started Abilify. antiseizure medication was stopped prior to discharge.     She is feeling better.       Pap:  June 6, 2018: Normal      Her oncologic history is:  Referring physician:  Dr. Sebas Reed  Reason for referral:  Incisional biopsy of retroperitoneal periaortic node that shows squamous cell carcinoma consistent with gynecologic primary        Aug 2018: chronic abdominal pain referred for consideration of biopsy of a 2 cm right common iliac artery bifurcation node  Node was evident on scan in 4/18 and again in 7/18 - no change  Patient's symptoms are non specific, diffuse, have not resulted in weight loss  Patient already carries a diagnosis of diffuse pain syndromes including fibromyalgia        Aug 2018: PET scan Right common iliac lymph node suspicious for malignancy.  This could be benign or malignant lymphoproliferative disorder metastatic disease.  This is an a risky position for percutaneous biopsy.      Sept 17, 2018: Underwent retroperitoneal approach for incisional biopsy of right common iliac LN. Pathology showed:  Supplemental Diagnosis  METASTATIC HIGH-GRADE CARCINOMA GROWING WITH SQUAMOUS CELL FEATURES. THE RESULTS OF  ADDITIONAL IMMUNOSTAINS ARE AS FOLLOWS: CK5/6, P63 AND CK7 ARE POSITIVE. TTF, S100 AND  CK20 ARE ALL NEGATIVE. THE CONTROLS STAIN APPROPRIATELY.  NOTE: GIVEN THESE RESULTS THIS MAY REPRESENT A PRIMARY IN THE URINARY OR LOWER GYN  TRACTS. DR. JAMESON HAS ALSO REVIEWED THIS CASE AND CONCURS WITH THE DIAGNOSIS.          She has a history of hysterectomy for cervical cancer in 2015 in [place], [place]. Hyst was for abnormal pap. Incidental  "finding of cervical cancer on the hyst specimen. She does not know any other details.      Nov 2018: started concurrent chemoradiation.     Dec 2018: admitted with hypokalemia, hypomagnesemia and hypocalcemia. Seizure like activity. Worsening depression. Electrolyte replacement. Started on Abilify by psychiatry. S/p 4 cycles of cisplatin.      Review of Systems   Constitutional: Negative for chills, fatigue and fever.   Respiratory: Negative for cough, shortness of breath and wheezing.    Cardiovascular: Negative for chest pain, palpitations and leg swelling.   Gastrointestinal: Negative for abdominal pain, constipation, diarrhea, nausea and vomiting.   Genitourinary: Negative for difficulty urinating, dysuria, frequency, genital sores, hematuria, urgency, vaginal bleeding, vaginal discharge and vaginal pain.   Musculoskeletal: Negative for gait problem.   Neurological: Negative for weakness and numbness.   Hematological: Negative for adenopathy. Does not bruise/bleed easily.   Psychiatric/Behavioral: The patient is not nervous/anxious.        Objective:   /63   Pulse 104   Ht 5' 8" (1.727 m)   Wt 99.8 kg (220 lb 0.3 oz)   LMP 06/08/2014 (Approximate)   BMI 33.45 kg/m²      Physical Exam   Constitutional: She is oriented to person, place, and time. She appears well-developed and well-nourished.   HENT:   Head: Normocephalic and atraumatic.   Eyes: No scleral icterus.   Neck: No tracheal deviation present. No thyromegaly present.   Cardiovascular: Normal rate and regular rhythm.   Pulmonary/Chest: Effort normal and breath sounds normal.   Abdominal: Soft. She exhibits no distension and no mass. There is no tenderness. There is no rebound and no guarding. No hernia.   Genitourinary:   Genitourinary Comments: Not performed.    Musculoskeletal: She exhibits no edema or tenderness.   Lymphadenopathy:     She has no cervical adenopathy.   Neurological: She is alert and oriented to person, place, and time. "   Skin: Skin is warm and dry.   Psychiatric: She has a normal mood and affect. Her behavior is normal. Judgment and thought content normal.       Assessment:       1. 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 Hypomagnesemia    3. Electrolyte disorder        Plan:   Metastatic squamous cell carcinoma to lymph node  Complete RT.   Stop cisplatin    RTC in 2 months with CT scan.     Check labs today.     -     NM PET CT Routine Skull to Mid Thigh; Future; Expected date: 12/14/2018    Hypomagnesemia  -     Magnesium; Future; Expected date: 12/14/2018    Electrolyte disorder  -     Calcium, ionized; Future; Expected date: 12/14/2018  -     Comprehensive metabolic panel; Future; Expected date: 12/14/2018

## 2018-12-14 NOTE — TELEPHONE ENCOUNTER
Spoke with pt. Pt informed per Dr. Lemon, to take tums 2 tablets a day and to increase mag oxide to 5 tablets a day. Repeat labs in  1 week. Pt given date and time for labs. She voiced understanding

## 2018-12-17 ENCOUNTER — DOCUMENTATION ONLY (OUTPATIENT)
Dept: RADIATION ONCOLOGY | Facility: CLINIC | Age: 53
End: 2018-12-17

## 2018-12-17 ENCOUNTER — TELEPHONE (OUTPATIENT)
Dept: GYNECOLOGIC ONCOLOGY | Facility: CLINIC | Age: 53
End: 2018-12-17

## 2018-12-17 PROCEDURE — 77387 GUIDANCE FOR RADJ TX DLVR: CPT | Mod: ,,, | Performed by: RADIOLOGY

## 2018-12-17 PROCEDURE — 77386 HC IMRT, COMPLEX: CPT | Performed by: RADIOLOGY

## 2018-12-17 NOTE — TELEPHONE ENCOUNTER
----- Message from Refugio Lemon MD sent at 12/17/2018 12:40 PM CST -----  Discussed with patient. Knows to take ca and mag. Repeat labs.

## 2018-12-18 PROCEDURE — 77370 RADIATION PHYSICS CONSULT: CPT | Performed by: RADIOLOGY

## 2018-12-18 PROCEDURE — 77295 3-D RADIOTHERAPY PLAN: CPT | Mod: 26,,, | Performed by: RADIOLOGY

## 2018-12-18 PROCEDURE — 77417 THER RADIOLOGY PORT IMAGE(S): CPT | Performed by: RADIOLOGY

## 2018-12-18 PROCEDURE — 77386 HC IMRT, COMPLEX: CPT | Performed by: RADIOLOGY

## 2018-12-18 PROCEDURE — 77470 SPECIAL RADIATION TREATMENT: CPT | Mod: 26,59,, | Performed by: RADIOLOGY

## 2018-12-18 PROCEDURE — 77300 RADIATION THERAPY DOSE PLAN: CPT | Mod: TC | Performed by: RADIOLOGY

## 2018-12-18 PROCEDURE — 77470 SPECIAL RADIATION TREATMENT: CPT | Mod: 59,TC | Performed by: RADIOLOGY

## 2018-12-18 PROCEDURE — 77295 3-D RADIOTHERAPY PLAN: CPT | Mod: TC | Performed by: RADIOLOGY

## 2018-12-18 PROCEDURE — 77387 GUIDANCE FOR RADJ TX DLVR: CPT | Mod: ,,, | Performed by: RADIOLOGY

## 2018-12-18 PROCEDURE — 77300 RADIATION THERAPY DOSE PLAN: CPT | Mod: 26,,, | Performed by: RADIOLOGY

## 2018-12-19 ENCOUNTER — DOCUMENTATION ONLY (OUTPATIENT)
Dept: RADIATION ONCOLOGY | Facility: CLINIC | Age: 53
End: 2018-12-19

## 2018-12-19 ENCOUNTER — PROCEDURE VISIT (OUTPATIENT)
Dept: RADIATION ONCOLOGY | Facility: CLINIC | Age: 53
End: 2018-12-19
Payer: MEDICAID

## 2018-12-19 DIAGNOSIS — Z85.41 HISTORY OF CERVICAL CANCER: ICD-10-CM

## 2018-12-19 DIAGNOSIS — C77.9 METASTATIC SQUAMOUS CELL CARCINOMA TO LYMPH NODE: Primary | ICD-10-CM

## 2018-12-19 LAB
ACHR BIND AB SER-SCNC: 0 NMOL/L
AMPHIPHYSIN AB TITR SER: NEGATIVE TITER
CV2 IGG TITR SER: NEGATIVE TITER
GLIAL NUC TYPE 1 AB TITR SER: NEGATIVE TITER
HU1 AB TITR SER: NEGATIVE TITER
HU2 AB TITR SER IF: NEGATIVE TITER
HU3 AB TITR SER: NEGATIVE TITER
IMMUNOLOGIST REVIEW: ABNORMAL
NACHR AB SER-SCNC: 0 NMOL/L
PAVAL REFLEX TEST ADDED: ABNORMAL
PCA-1 AB TITR SER: NEGATIVE TITER
PCA-2 AB TITR SER: NEGATIVE TITER
PCA-TR AB TITR SER: NEGATIVE TITER
STRIA MUS AB TITR SER: ABNORMAL TITER
VGCC-N BIND AB SER-SCNC: 0 NMOL/L
VGCC-P/Q BIND AB SER-SCNC: 0 NMOL/L
VGKC AB SER-SCNC: 0 NMOL/L

## 2018-12-19 NOTE — NURSING
12/19/2018  Nurse: Carmen Carrillo    Procedure: Vaginal Cylinder    1415: Patient arrived from Home.  Time out performed.    1421: 2.5 cm vaginal cylinder inserted.  hdr treatment given with full bladder.  Pat. Tolerated well.

## 2018-12-19 NOTE — PLAN OF CARE
Problem: Adult Inpatient Plan of Care  Goal: Plan of Care Review  Outcome: Ongoing (interventions implemented as appropriate)  Pt. On day 26 of xrt to pelvis.  Pt. Doing well.  No c/o.  No n/v/d.  Pt. To start HDR tx this week.

## 2018-12-19 NOTE — PROCEDURES
PROCEDURE NOTE:         REFERRING PHYSICIAN: Sebas Reed MD      DIAGNOSIS: History of cervical cancer now with pelvic lymph node recurrence    Ms. Riley is a 53-year-old female with history fibromyalgia, stroke at the age of 7, and cervical cancer status post hysterectomy in 2015 in [place], [place] who was recently found to have a pelvic lymph node recurrence after evaluation for generalized malaise and abdominal pain. Workup with CT of the abdomen and pelvis on July 24, 2018 revealed an enlarged lymph node at the bifurcation of the right common iliac measuring 2 cm. There was also mild enlargement of the liver with hypoattenuation suggestive of steatosis. A PET scan on August 31, 2018 revealed hypermetabolic activity associated with the right iliac lymph node with SUV max of 10. There are low-grade axillary and inguinal lymph nodes noted with SUV max less than 2. There is no signs of metastatic disease. On is September 18, 2018, she underwent incisional biopsy of this lesion with pathology revealing metastatic high-grade carcinoma with squamous cell features most likely GYN origin. A Pap smear on October 12, 2018 is negative for malignancy. She also underwent evaluation by Urology which was negative for any urological origin.     She is currently receiving external beam radiation to the pelvis with a boost to the pelvic lymph node.  She is also recommended to undergo vaginal cuff brachytherapy to reduce her chance of recurrence. She is here today for her 1st treatment.      DATE OF PROCEDURE: 12/19/2018      PROCEDURE: Intracavitary vaginal HDR #1      AREA TREATED: Vaginal cuff and upper 1/2 of the vagina      TREATMENT METHOD: Insertion of 2.5 cm vaginal cylinder into vagina      RADIATION: Iridium 192, high-dose-rate      DEPTH OF CALCULATION:  vaginal (cylinder) surface      DOSE: 5.0 gray      Time Out:   Performed by Carmen Carrillo RN  Identifiers used: Name and date of birth      She was brought to the  HDR delivery room and a 2.5 cm vaginal cylinder was placed into the vagina. The brachytherapy catheter was connected to the cylinder and the treatment was delivered. She received the 1st out of 3 fractions of HDR vaginal brachytherapy as above. She tolerated the treatment without any unexpected side effects. She will return in 1 week to continue her treatment. I was present during the entire procedure.  She will continue daily external beam treatments as planned.

## 2018-12-20 PROCEDURE — 77386 HC IMRT, COMPLEX: CPT | Performed by: RADIOLOGY

## 2018-12-20 PROCEDURE — 77387 GUIDANCE FOR RADJ TX DLVR: CPT | Mod: ,,, | Performed by: RADIOLOGY

## 2018-12-21 ENCOUNTER — LAB VISIT (OUTPATIENT)
Dept: LAB | Facility: HOSPITAL | Age: 53
End: 2018-12-21
Attending: OBSTETRICS & GYNECOLOGY
Payer: MEDICAID

## 2018-12-21 ENCOUNTER — TELEPHONE (OUTPATIENT)
Dept: GYNECOLOGIC ONCOLOGY | Facility: CLINIC | Age: 53
End: 2018-12-21

## 2018-12-21 DIAGNOSIS — E87.8 ELECTROLYTE DISORDER: ICD-10-CM

## 2018-12-21 LAB
ANION GAP SERPL CALC-SCNC: 10 MMOL/L
BUN SERPL-MCNC: 11 MG/DL
CA-I BLDV-SCNC: 0.92 MMOL/L
CALCIUM SERPL-MCNC: 7.5 MG/DL
CHLORIDE SERPL-SCNC: 105 MMOL/L
CO2 SERPL-SCNC: 26 MMOL/L
CREAT SERPL-MCNC: 1.1 MG/DL
EST. GFR  (AFRICAN AMERICAN): >60 ML/MIN/1.73 M^2
EST. GFR  (NON AFRICAN AMERICAN): 56.7 ML/MIN/1.73 M^2
GLUCOSE SERPL-MCNC: 201 MG/DL
MAGNESIUM SERPL-MCNC: 0.8 MG/DL
POTASSIUM SERPL-SCNC: 4.1 MMOL/L
SODIUM SERPL-SCNC: 141 MMOL/L

## 2018-12-21 PROCEDURE — 36415 COLL VENOUS BLD VENIPUNCTURE: CPT

## 2018-12-21 PROCEDURE — 77386 HC IMRT, COMPLEX: CPT | Performed by: RADIOLOGY

## 2018-12-21 PROCEDURE — 80048 BASIC METABOLIC PNL TOTAL CA: CPT

## 2018-12-21 PROCEDURE — 82330 ASSAY OF CALCIUM: CPT

## 2018-12-21 PROCEDURE — 77387 GUIDANCE FOR RADJ TX DLVR: CPT | Mod: ,,, | Performed by: RADIOLOGY

## 2018-12-21 PROCEDURE — 77336 RADIATION PHYSICS CONSULT: CPT | Performed by: RADIOLOGY

## 2018-12-21 PROCEDURE — 83735 ASSAY OF MAGNESIUM: CPT

## 2018-12-24 PROCEDURE — 77386 HC IMRT, COMPLEX: CPT | Performed by: RADIOLOGY

## 2018-12-24 PROCEDURE — 77387 GUIDANCE FOR RADJ TX DLVR: CPT | Mod: ,,, | Performed by: RADIOLOGY

## 2018-12-26 ENCOUNTER — LAB VISIT (OUTPATIENT)
Dept: LAB | Facility: HOSPITAL | Age: 53
End: 2018-12-26
Attending: OBSTETRICS & GYNECOLOGY
Payer: MEDICAID

## 2018-12-26 DIAGNOSIS — E83.42 HYPOMAGNESEMIA: ICD-10-CM

## 2018-12-26 DIAGNOSIS — C77.9 METASTATIC SQUAMOUS CELL CARCINOMA TO LYMPH NODE: ICD-10-CM

## 2018-12-26 LAB
ANION GAP SERPL CALC-SCNC: 11 MMOL/L
BUN SERPL-MCNC: 10 MG/DL
CALCIUM SERPL-MCNC: 8.2 MG/DL
CHLORIDE SERPL-SCNC: 104 MMOL/L
CO2 SERPL-SCNC: 26 MMOL/L
CREAT SERPL-MCNC: 1.1 MG/DL
EST. GFR  (AFRICAN AMERICAN): >60 ML/MIN/1.73 M^2
EST. GFR  (NON AFRICAN AMERICAN): 56.7 ML/MIN/1.73 M^2
GLUCOSE SERPL-MCNC: 147 MG/DL
MAGNESIUM SERPL-MCNC: 1 MG/DL
POTASSIUM SERPL-SCNC: 4 MMOL/L
SODIUM SERPL-SCNC: 141 MMOL/L

## 2018-12-26 PROCEDURE — 83735 ASSAY OF MAGNESIUM: CPT

## 2018-12-26 PROCEDURE — 36415 COLL VENOUS BLD VENIPUNCTURE: CPT

## 2018-12-26 PROCEDURE — 77387 GUIDANCE FOR RADJ TX DLVR: CPT | Mod: ,,, | Performed by: RADIOLOGY

## 2018-12-26 PROCEDURE — 80048 BASIC METABOLIC PNL TOTAL CA: CPT

## 2018-12-26 PROCEDURE — 77386 HC IMRT, COMPLEX: CPT | Performed by: RADIOLOGY

## 2018-12-27 ENCOUNTER — PROCEDURE VISIT (OUTPATIENT)
Dept: RADIATION ONCOLOGY | Facility: CLINIC | Age: 53
End: 2018-12-27
Payer: MEDICAID

## 2018-12-27 DIAGNOSIS — C77.9 METASTATIC SQUAMOUS CELL CARCINOMA TO LYMPH NODE: Primary | ICD-10-CM

## 2018-12-27 DIAGNOSIS — R59.1 LYMPHADENOPATHY: ICD-10-CM

## 2018-12-27 PROCEDURE — 77770 HDR RDNCL NTRSTL/ICAV BRCHTX: CPT | Mod: TC | Performed by: RADIOLOGY

## 2018-12-27 PROCEDURE — 57156 INS VAG BRACHYTX DEVICE: CPT | Mod: 26,,, | Performed by: RADIOLOGY

## 2018-12-27 PROCEDURE — C1717 BRACHYTX, NON-STR,HDR IR-192: HCPCS | Performed by: RADIOLOGY

## 2018-12-27 PROCEDURE — 57156 INS VAG BRACHYTX DEVICE: CPT | Mod: TC | Performed by: RADIOLOGY

## 2018-12-27 PROCEDURE — 77770 HDR RDNCL NTRSTL/ICAV BRCHTX: CPT | Mod: 26,,, | Performed by: RADIOLOGY

## 2018-12-27 NOTE — NURSING
12/27/2018  Nurse: Carmen Carrillo    Procedure: Vaginal Cylinder    1315: Patient arrived from Home.  Time out performed.    1321: 2.5 cm vaginal cylinder inserted.  Hdr treatment given with full bladder.  Pt. Tolerated well.

## 2018-12-27 NOTE — PROCEDURES
PROCEDURE NOTE:         REFERRING PHYSICIAN: Sebas Reed MD      DIAGNOSIS: History of cervical cancer now with pelvic lymph node recurrence     Ms. Riley is a 53-year-old female with history fibromyalgia, stroke at the age of 7, and cervical cancer status post hysterectomy in 2015 in [place], [place] who was recently found to have a pelvic lymph node recurrence after evaluation for generalized malaise and abdominal pain. Workup with CT of the abdomen and pelvis on July 24, 2018 revealed an enlarged lymph node at the bifurcation of the right common iliac measuring 2 cm. There was also mild enlargement of the liver with hypoattenuation suggestive of steatosis. A PET scan on August 31, 2018 revealed hypermetabolic activity associated with the right iliac lymph node with SUV max of 10. There are low-grade axillary and inguinal lymph nodes noted with SUV max less than 2. There is no signs of metastatic disease. On is September 18, 2018, she underwent incisional biopsy of this lesion with pathology revealing metastatic high-grade carcinoma with squamous cell features most likely GYN origin. A Pap smear on October 12, 2018 is negative for malignancy. She also underwent evaluation by Urology which was negative for any urological origin.      She is currently receiving external beam radiation to the pelvis with a boost to the pelvic lymph node.  She is also recommended to undergo vaginal cuff brachytherapy to reduce her chance of recurrence. She is here today for her 2nd treatment.      DATE OF PROCEDURE: 12/27/2018      PROCEDURE: Intracavitary vaginal HDR #2      AREA TREATED: Vaginal cuff and upper 1/2 of the vagina      TREATMENT METHOD: Insertion of 2.5 cm vaginal cylinder into vagina      RADIATION: Iridium 192, high-dose-rate      DEPTH OF CALCULATION:  vaginal (cylinder) surface      DOSE: 5.0 gray      Time Out:   Performed by Carmen Carrillo RN  Identifiers used: Name and date of birth      She was brought to  the HDR delivery room and a 2.5 cm vaginal cylinder was placed into the vagina. The brachytherapy catheter was connected to the cylinder and the treatment was delivered. She received the 2nd out of 3 fractions of HDR vaginal brachytherapy as above. She tolerated the treatment without any unexpected side effects. She will return in 1 week to continue her treatment. I was present during the entire procedure.  She will continue daily external beam treatments as planned.

## 2018-12-28 ENCOUNTER — OUTPATIENT CASE MANAGEMENT (OUTPATIENT)
Dept: ADMINISTRATIVE | Facility: OTHER | Age: 53
End: 2018-12-28

## 2018-12-28 PROCEDURE — 77386 HC IMRT, COMPLEX: CPT | Performed by: RADIOLOGY

## 2018-12-28 PROCEDURE — 77387 GUIDANCE FOR RADJ TX DLVR: CPT | Mod: ,,, | Performed by: RADIOLOGY

## 2018-12-28 NOTE — PROGRESS NOTES
Please note the following patient's information was forwarded to the Medicaid Case Management office on 12/7/18.    Please contact Outpatient Complex Care Management at ext [phone] with any questions.    Thank you,    Vonda Duran, [facility]  Outpatient Care Mgmt.  [phone]

## 2018-12-31 PROCEDURE — 77386 HC IMRT, COMPLEX: CPT | Performed by: RADIOLOGY

## 2018-12-31 PROCEDURE — 77336 RADIATION PHYSICS CONSULT: CPT | Performed by: RADIOLOGY

## 2018-12-31 PROCEDURE — 77387 GUIDANCE FOR RADJ TX DLVR: CPT | Mod: ,,, | Performed by: RADIOLOGY

## 2019-01-02 ENCOUNTER — HOSPITAL ENCOUNTER (OUTPATIENT)
Dept: RADIATION THERAPY | Facility: HOSPITAL | Age: 54
Discharge: HOME | End: 2019-01-02
Attending: RADIOLOGY
Payer: MEDICAID

## 2019-01-02 PROCEDURE — 77386 HC IMRT, COMPLEX: CPT | Performed by: RADIOLOGY

## 2019-01-02 PROCEDURE — 77387 GUIDANCE FOR RADJ TX DLVR: CPT | Mod: ,,, | Performed by: RADIOLOGY

## 2019-01-02 PROCEDURE — 77387 PR GUIDANCE FOR RADIATION TREATMENT DELIVERY: ICD-10-PCS | Mod: ,,, | Performed by: RADIOLOGY

## 2019-01-03 ENCOUNTER — TREATMENT PLANNING (OUTPATIENT)
Dept: RADIATION ONCOLOGY | Facility: CLINIC | Age: 54
End: 2019-01-03

## 2019-01-03 ENCOUNTER — DOCUMENTATION ONLY (OUTPATIENT)
Dept: RADIATION ONCOLOGY | Facility: CLINIC | Age: 54
End: 2019-01-03

## 2019-01-03 PROCEDURE — C1717 BRACHYTX, NON-STR,HDR IR-192: HCPCS | Performed by: RADIOLOGY

## 2019-01-03 PROCEDURE — 77770 PR HDR RDNCL NTRSTL/ICAV BRCHTX 1 CH: ICD-10-PCS | Mod: 26,,, | Performed by: RADIOLOGY

## 2019-01-03 PROCEDURE — 57156 INS VAG BRACHYTX DEVICE: CPT | Mod: 26,,, | Performed by: RADIOLOGY

## 2019-01-03 PROCEDURE — 57156 INS VAG BRACHYTX DEVICE: CPT | Mod: TC | Performed by: RADIOLOGY

## 2019-01-03 PROCEDURE — 77770 HDR RDNCL NTRSTL/ICAV BRCHTX: CPT | Mod: 26,,, | Performed by: RADIOLOGY

## 2019-01-03 PROCEDURE — 57156 PR INSERT VAGINAL RADIATION DEVICE: ICD-10-PCS | Mod: 26,,, | Performed by: RADIOLOGY

## 2019-01-03 PROCEDURE — 77770 HDR RDNCL NTRSTL/ICAV BRCHTX: CPT | Mod: TC | Performed by: RADIOLOGY

## 2019-01-04 ENCOUNTER — DOCUMENTATION ONLY (OUTPATIENT)
Dept: RADIATION ONCOLOGY | Facility: CLINIC | Age: 54
End: 2019-01-04

## 2019-01-04 NOTE — PLAN OF CARE
Problem: Adult Inpatient Plan of Care  Goal: Plan of Care Review  Outcome: Outcome(s) achieved Date Met: 01/04/19  Pt. Completed xrt and hdr to pelvis and vaginal cuff on 1/3.  RTC 6 weeks.

## 2019-01-10 ENCOUNTER — OFFICE VISIT (OUTPATIENT)
Dept: PSYCHIATRY | Facility: CLINIC | Age: 54
End: 2019-01-10
Payer: MEDICAID

## 2019-01-10 DIAGNOSIS — F33.3 SEVERE EPISODE OF RECURRENT MAJOR DEPRESSIVE DISORDER, WITH PSYCHOTIC FEATURES: Primary | ICD-10-CM

## 2019-01-10 DIAGNOSIS — F41.1 GENERALIZED ANXIETY DISORDER: ICD-10-CM

## 2019-01-10 PROCEDURE — 99212 OFFICE O/P EST SF 10 MIN: CPT | Mod: PBBFAC,25 | Performed by: PSYCHOLOGIST

## 2019-01-10 PROCEDURE — 90834 PSYTX W PT 45 MINUTES: CPT | Mod: AH,HB,S$PBB, | Performed by: PSYCHOLOGIST

## 2019-01-10 PROCEDURE — 99999 PR PBB SHADOW E&M-EST. PATIENT-LVL II: CPT | Mod: PBBFAC,,, | Performed by: PSYCHOLOGIST

## 2019-01-10 PROCEDURE — 99999 PR PBB SHADOW E&M-EST. PATIENT-LVL II: ICD-10-PCS | Mod: PBBFAC,,, | Performed by: PSYCHOLOGIST

## 2019-01-10 PROCEDURE — 90834 PR PSYCHOTHERAPY W/PATIENT, 45 MIN: ICD-10-PCS | Mod: AH,HB,S$PBB, | Performed by: PSYCHOLOGIST

## 2019-01-10 PROCEDURE — 90834 PSYTX W PT 45 MINUTES: CPT | Mod: PBBFAC | Performed by: PSYCHOLOGIST

## 2019-01-10 NOTE — PROGRESS NOTES
PSYCHO-ONCOLOGY NOTE/ Individual Psychotherapy     Date: 1/10/2019   [facility]        Therapeutic Intervention: Met with patient.  Outpatient - Behavior modifying psychotherapy 45 min - CPT code 11672    The patient's last visit with me was on 12/13/2018.     Problem list  Patient Active Problem List   Diagnosis    Osteoarthritis of back    Hiatal hernia    Essential hypertension    Lower extremity pain, diffuse    Weakness of both lower extremities    Impaired functional mobility, balance, gait, and endurance    Schatzki's ring    Colon polyp    Internal hemorrhoids    Cardiovascular event risk -- low    Hemifacial spasm    Severe episode of recurrent major depressive disorder, with psychotic features    Fibromyalgia    Facial palsy    Blood in stool    Sleep stage dysfunction    Carpal tunnel syndrome on right    Weakness    Difficulty walking    Gastroesophageal reflux disease with esophagitis    Abnormal CT of the abdomen    Lymphadenopathy    History of cervical cancer    Metastatic squamous cell carcinoma to lymph node    Severe recurrent major depression with psychotic features    Generalized anxiety disorder    PTSD (post-traumatic stress disorder)    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 drug    Diarrhea due to malabsorption    Hypomagnesemia    Hypomagnesemia    Seizure-like activity    Stroke    Electrolyte disorder       Chief complaint/reason for encounter: depression and anxiety   Met with patient to evaluate psychosocial adaptation to diagnosis/treatment/survivorship of metastatic cervical cancer    Current Medications  Current Outpatient Medications   Medication    ARIPiprazole (ABILIFY) 2 MG Tab    arm brace (NEOPRENE WRIST SPLINT SUPPORT) Misc    cholecalciferol, vitamin D3, 2,000 unit Cap    cyclobenzaprine (FLEXERIL) 10 MG tablet    diphenoxylate-atropine 2.5-0.025 mg (LOMOTIL) 2.5-0.025 mg per tablet    DULoxetine (CYMBALTA) 60 MG capsule     gabapentin (NEURONTIN) 300 MG capsule    lisinopril 10 MG tablet    magnesium oxide (MAG-OX) 400 mg (241.3 mg magnesium) tablet    meclizine (ANTIVERT) 25 mg tablet    omeprazole (PRILOSEC) 40 MG capsule    oxyCODONE-acetaminophen (PERCOCET) 5-325 mg per tablet    potassium chloride SA (K-DUR,KLOR-CON) 20 MEQ tablet    traMADol (ULTRAM) 50 mg tablet    traZODone (DESYREL) 50 MG tablet     No current facility-administered medications for this visit.        Objective:  Edita Riley arrived promptly for the session.  Ms. Riley was independently ambulatory at the time of session. The patient was fully cooperative throughout the session.  Appearance: age appropriate, casually  dressed, adequately  groomed  Behavior/Cooperation: friendly and cooperative  Speech: inflexionless , slowed  and slightly slurred  Mood: euthymic  Affect: blunted  Thought Process: goal-directed, logical  Thought Content: normal,  No delusions or paranoia; did not appear to be responding to internal stimuli during the session  Orientation: grossly intact  Memory: Grossly intact  Attention Span/Concentration: Attends to session without distraction; reports no difficulty  Fund of Knowledge: average  Estimate of Intelligence: average from verbal skills and history  Cognition: grossly intact  Insight: patient has awareness of illness; good insight into own behavior and behavior of others  Judgment: the patient's behavior is adequate to circumstances    Interval history and content of current session: Patient discussed improved emotional and physical functioning with completion of radiation treatment. Discussed current adaptation to disease and treatment status and family's adaptation to disease and treatment status. Reports to be coping in an adequate manner. She reports her family is coping well, especially now that treatment is completed.     Discussed medication adherence with patient. She is taking her Abilify regularly  "and notes improvement to hallucinations ("hardly ever happen now").  Patient discussed improved mood and decreased worry with routine use of Cymbalta and trazodone for sleep. Reinforced the importance of [facility] to ensure [facility] to psychotropic medications.     Identified and evaluated psychosocial and environmental stressors secondary to diagnosis and treatment.  Examined proactive behaviors that may be implemented to minimize or ameliorate psychosocial stressors secondary to diagnosis and treatment.     Risk parameters:   Patient reports no homicidal ideation  Patient reports no self-injurious behavior  Patient reports no violent behavior   Patient reports mild suicidal ideation without intent or plan   Safety needs:  None at this time      Verbal deficits: slight slurring, no word-finding difficulty during today's visit     Patient's response to intervention:The patient's response to intervention is accepting.     Progress toward goals and other mental status changes:  The patient's progress toward goals is fair .      Progress to date:Progress as Expected      Goals from last visit: Attempted, partially met      Patient reported outcomes:  Distress Thermometer:   Distress Score    Distress Score: 6        Practical Problems Physical Problems                                                   Family Problems                                         Emotional Problems                                                         Spiritual/Religions Concerns               Other Problems             PHQ-9=  18; 26 at intake   QUOC-7= 10; 19 at intake      Client Strengths: good social support, commitment to wellness    Diagnosis:     ICD-10-CM ICD-9-CM   1. Severe episode of recurrent major depressive disorder, with psychotic features F33.3 296.34   2. Generalized anxiety disorder F41.1 300.02       · Treatment Plan:individual psychotherapy and consult psychiatrist for medication evaluation  " ([facility])  · Target symptoms: depression, anxiety   · Why chosen therapy is appropriate versus another modality: relevant to diagnosis, evidence based practice  · Outcome monitoring methods: self-report, checklist/rating scale  · Therapeutic intervention type: behavior modifying psychotherapy  · Prognosis: Fair      Behavioral goals:    Exercise:     Stress management: reading, Pac Man game   Social engagement: return to Judaism ?   Nutrition:   Smoking Cessation:   Therapy:  Buy a pillbox to improve medication adherence    Call [facility] now for appt due to lag time in new patient visits    Return to clinic: 1 month     Length of Service (minutes direct face-to-face contact): 45    Enoc Martinez, PhD  [place] License #[id number]

## 2019-01-14 ENCOUNTER — OFFICE VISIT (OUTPATIENT)
Dept: INTERNAL MEDICINE | Facility: CLINIC | Age: 54
End: 2019-01-14
Payer: MEDICAID

## 2019-01-14 ENCOUNTER — LAB VISIT (OUTPATIENT)
Dept: LAB | Facility: HOSPITAL | Age: 54
End: 2019-01-14
Payer: MEDICAID

## 2019-01-14 VITALS
WEIGHT: 226.19 LBS | BODY MASS INDEX: 34.28 KG/M2 | HEART RATE: 109 BPM | DIASTOLIC BLOOD PRESSURE: 85 MMHG | SYSTOLIC BLOOD PRESSURE: 135 MMHG | HEIGHT: 68 IN | OXYGEN SATURATION: 97 %

## 2019-01-14 DIAGNOSIS — E87.8 ELECTROLYTE DISORDER: ICD-10-CM

## 2019-01-14 DIAGNOSIS — G89.29 CHRONIC BILATERAL LOW BACK PAIN WITH LEFT-SIDED SCIATICA: Primary | ICD-10-CM

## 2019-01-14 DIAGNOSIS — M54.42 CHRONIC BILATERAL LOW BACK PAIN WITH LEFT-SIDED SCIATICA: Primary | ICD-10-CM

## 2019-01-14 LAB
ANION GAP SERPL CALC-SCNC: 11 MMOL/L
BUN SERPL-MCNC: 13 MG/DL
CALCIUM SERPL-MCNC: 8.9 MG/DL
CHLORIDE SERPL-SCNC: 103 MMOL/L
CO2 SERPL-SCNC: 29 MMOL/L
CREAT SERPL-MCNC: 1.1 MG/DL
EST. GFR  (AFRICAN AMERICAN): >60 ML/MIN/1.73 M^2
EST. GFR  (NON AFRICAN AMERICAN): 56 ML/MIN/1.73 M^2
GLUCOSE SERPL-MCNC: 187 MG/DL
MAGNESIUM SERPL-MCNC: 1.4 MG/DL
POTASSIUM SERPL-SCNC: 3.8 MMOL/L
SODIUM SERPL-SCNC: 143 MMOL/L

## 2019-01-14 PROCEDURE — 99214 OFFICE O/P EST MOD 30 MIN: CPT | Mod: PBBFAC | Performed by: STUDENT IN AN ORGANIZED HEALTH CARE EDUCATION/TRAINING PROGRAM

## 2019-01-14 PROCEDURE — 36415 COLL VENOUS BLD VENIPUNCTURE: CPT

## 2019-01-14 PROCEDURE — 99999 PR PBB SHADOW E&M-EST. PATIENT-LVL IV: CPT | Mod: PBBFAC,,, | Performed by: STUDENT IN AN ORGANIZED HEALTH CARE EDUCATION/TRAINING PROGRAM

## 2019-01-14 PROCEDURE — 99213 PR OFFICE/OUTPT VISIT, EST, LEVL III, 20-29 MIN: ICD-10-PCS | Mod: S$PBB,,, | Performed by: STUDENT IN AN ORGANIZED HEALTH CARE EDUCATION/TRAINING PROGRAM

## 2019-01-14 PROCEDURE — 80048 BASIC METABOLIC PNL TOTAL CA: CPT

## 2019-01-14 PROCEDURE — 83735 ASSAY OF MAGNESIUM: CPT

## 2019-01-14 PROCEDURE — 99213 OFFICE O/P EST LOW 20 MIN: CPT | Mod: S$PBB,,, | Performed by: STUDENT IN AN ORGANIZED HEALTH CARE EDUCATION/TRAINING PROGRAM

## 2019-01-14 PROCEDURE — 99999 PR PBB SHADOW E&M-EST. PATIENT-LVL IV: ICD-10-PCS | Mod: PBBFAC,,, | Performed by: STUDENT IN AN ORGANIZED HEALTH CARE EDUCATION/TRAINING PROGRAM

## 2019-01-14 RX ORDER — POTASSIUM CHLORIDE 20 MEQ/1
TABLET, EXTENDED RELEASE ORAL DAILY
Qty: 30 TABLET | Refills: 11 | Status: CANCELLED | OUTPATIENT
Start: 2019-01-14
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
## 2019-01-16 NOTE — PROGRESS NOTES
I have reviewed the notes, assessments, and/or procedures performed by Dr. Snider, I concur with her/his documentation of Edita Riley.

## 2019-01-18 ENCOUNTER — TELEPHONE (OUTPATIENT)
Dept: INTERNAL MEDICINE | Facility: CLINIC | Age: 54
End: 2019-01-18

## 2019-01-18 DIAGNOSIS — E83.42 HYPOMAGNESEMIA: ICD-10-CM

## 2019-01-18 DIAGNOSIS — E87.8 ELECTROLYTE ABNORMALITY: Primary | ICD-10-CM

## 2019-01-18 RX ORDER — POTASSIUM CHLORIDE 20 MEQ/1
TABLET, EXTENDED RELEASE ORAL DAILY
Qty: 30 TABLET | Refills: 1 | Status: SHIPPED | OUTPATIENT
Start: 2019-01-18 | End: 2019-04-15 | Stop reason: SDUPTHER

## 2019-01-18 RX ORDER — LANOLIN ALCOHOL/MO/W.PET/CERES
400 CREAM (GRAM) TOPICAL 3 TIMES DAILY
Qty: 90 TABLET | Refills: 1 | COMMUNITY
Start: 2019-01-18 | End: 2020-05-04

## 2019-01-18 NOTE — TELEPHONE ENCOUNTER
Called patient to inform about low Magnesium. She stated that she was taking Magnesium oxide 400mg TID and had only had few tablets left. Also stated out of potassium supplements 20meq which she was taking on daily basis. Refilled Magnesium and Potassium supplements. Informed will schedule Lab appointment for next two weeks to monitor potassium and Magnesium.              Ms. Blackwell,  Please schedule lab appointment for patient for 2 weeks from now for BMP and Mg.    Thank you

## 2019-01-21 ENCOUNTER — TELEPHONE (OUTPATIENT)
Dept: INTERNAL MEDICINE | Facility: CLINIC | Age: 54
End: 2019-01-21

## 2019-01-22 ENCOUNTER — TELEPHONE (OUTPATIENT)
Dept: GYNECOLOGIC ONCOLOGY | Facility: CLINIC | Age: 54
End: 2019-01-22

## 2019-02-04 ENCOUNTER — LAB VISIT (OUTPATIENT)
Dept: LAB | Facility: HOSPITAL | Age: 54
End: 2019-02-04
Payer: MEDICAID

## 2019-02-04 DIAGNOSIS — E87.8 ELECTROLYTE ABNORMALITY: ICD-10-CM

## 2019-02-04 LAB
ANION GAP SERPL CALC-SCNC: 9 MMOL/L
BUN SERPL-MCNC: 18 MG/DL
CALCIUM SERPL-MCNC: 9.4 MG/DL
CHLORIDE SERPL-SCNC: 104 MMOL/L
CO2 SERPL-SCNC: 26 MMOL/L
CREAT SERPL-MCNC: 1.3 MG/DL
EST. GFR  (AFRICAN AMERICAN): 53 ML/MIN/1.73 M^2
EST. GFR  (NON AFRICAN AMERICAN): 46 ML/MIN/1.73 M^2
GLUCOSE SERPL-MCNC: 173 MG/DL
MAGNESIUM SERPL-MCNC: 1.7 MG/DL
POTASSIUM SERPL-SCNC: 4.2 MMOL/L
SODIUM SERPL-SCNC: 139 MMOL/L

## 2019-02-04 PROCEDURE — 80048 BASIC METABOLIC PNL TOTAL CA: CPT

## 2019-02-04 PROCEDURE — 36415 COLL VENOUS BLD VENIPUNCTURE: CPT

## 2019-02-04 PROCEDURE — 83735 ASSAY OF MAGNESIUM: CPT

## 2019-02-11 ENCOUNTER — TELEPHONE (OUTPATIENT)
Dept: HEPATOLOGY | Facility: HOSPITAL | Age: 54
End: 2019-02-11

## 2019-02-12 ENCOUNTER — OFFICE VISIT (OUTPATIENT)
Dept: PSYCHIATRY | Facility: CLINIC | Age: 54
End: 2019-02-12
Payer: MEDICAID

## 2019-02-12 DIAGNOSIS — F41.1 GENERALIZED ANXIETY DISORDER: ICD-10-CM

## 2019-02-12 DIAGNOSIS — F33.3 SEVERE EPISODE OF RECURRENT MAJOR DEPRESSIVE DISORDER, WITH PSYCHOTIC FEATURES: Primary | ICD-10-CM

## 2019-02-12 PROCEDURE — 90834 PSYTX W PT 45 MINUTES: CPT | Mod: PBBFAC | Performed by: PSYCHOLOGIST

## 2019-02-12 PROCEDURE — 99212 OFFICE O/P EST SF 10 MIN: CPT | Mod: PBBFAC | Performed by: PSYCHOLOGIST

## 2019-02-12 PROCEDURE — 90834 PSYTX W PT 45 MINUTES: CPT | Mod: HP,HB,S$PBB, | Performed by: PSYCHOLOGIST

## 2019-02-12 PROCEDURE — 99999 PR PBB SHADOW E&M-EST. PATIENT-LVL II: CPT | Mod: PBBFAC,,, | Performed by: PSYCHOLOGIST

## 2019-02-12 PROCEDURE — 99999 PR PBB SHADOW E&M-EST. PATIENT-LVL II: ICD-10-PCS | Mod: PBBFAC,,, | Performed by: PSYCHOLOGIST

## 2019-02-12 PROCEDURE — 90834 PR PSYCHOTHERAPY W/PATIENT, 45 MIN: ICD-10-PCS | Mod: HP,HB,S$PBB, | Performed by: PSYCHOLOGIST

## 2019-02-12 NOTE — TELEPHONE ENCOUNTER
Called patient to inform that Magnesium and Potassium were within normal limits. Also informed that creatinine was slightly elevated from 1.1 to 1.3 and encouraged patient to continue to remain hydrated with lots of fluids. Patient voiced understanding.    2/11/19   7:00PM

## 2019-02-13 ENCOUNTER — HOSPITAL ENCOUNTER (OUTPATIENT)
Dept: RADIOLOGY | Facility: HOSPITAL | Age: 54
Discharge: HOME | End: 2019-02-13
Attending: OBSTETRICS & GYNECOLOGY
Payer: MEDICAID

## 2019-02-13 DIAGNOSIS — C77.9 METASTATIC SQUAMOUS CELL CARCINOMA TO LYMPH NODE: ICD-10-CM

## 2019-02-13 LAB — POCT GLUCOSE: 101 MG/DL (ref 70–110)

## 2019-02-13 PROCEDURE — 78815 NM PET CT ROUTINE: ICD-10-PCS | Mod: 26,PS,, | Performed by: RADIOLOGY

## 2019-02-13 PROCEDURE — 78815 PET IMAGE W/CT SKULL-THIGH: CPT | Mod: 26,PS,, | Performed by: RADIOLOGY

## 2019-02-13 PROCEDURE — 78815 PET IMAGE W/CT SKULL-THIGH: CPT | Mod: TC

## 2019-02-13 PROCEDURE — A9552 F18 FDG: HCPCS

## 2019-02-14 NOTE — PROGRESS NOTES
Subjective:       Patient ID: Edita Riley is a 54 y.o. female.    Chief Complaint: Metastatic squamous cell carcinoma to lymph node (2 month)    HPI   Patient comes in for follow up for recurrent SCCA of the cervix.    Completed concurrent chemoradiation in  2019.     2019: PET:   Right common iliac lymph node, shows max SUV of 5.6 on today's exam versus 10.2 on prior exam.  A subcentimeter lymph node is seen in CT in this region.    The previously seen avid left axillary lymph node is no longer visualized is FDG avid.            Pap:  2018: Normal  MM2018: normal        Her oncologic history is:  Referring physician:  Dr. Sebas Reed  Reason for referral:  Incisional biopsy of retroperitoneal periaortic node that shows squamous cell carcinoma consistent with gynecologic primary        Aug 2018: chronic abdominal pain referred for consideration of biopsy of a 2 cm right common iliac artery bifurcation node  Node was evident on scan in  and again in  - no change  Patient's symptoms are non specific, diffuse, have not resulted in weight loss  Patient already carries a diagnosis of diffuse pain syndromes including fibromyalgia        Aug 2018: PET scan Right common iliac lymph node suspicious for malignancy.  This could be benign or malignant lymphoproliferative disorder metastatic disease.  This is an a risky position for percutaneous biopsy.      2018: Underwent retroperitoneal approach for incisional biopsy of right common iliac LN. Pathology showed:  Supplemental Diagnosis  METASTATIC HIGH-GRADE CARCINOMA GROWING WITH SQUAMOUS CELL FEATURES. THE RESULTS OF  ADDITIONAL IMMUNOSTAINS ARE AS FOLLOWS: CK5/6, P63 AND CK7 ARE POSITIVE. TTF, S100 AND  CK20 ARE ALL NEGATIVE. THE CONTROLS STAIN APPROPRIATELY.  NOTE: GIVEN THESE RESULTS THIS MAY REPRESENT A PRIMARY IN THE URINARY OR LOWER GYN  TRACTS. DR. JAMESON HAS ALSO REVIEWED THIS CASE AND CONCURS WITH THE  "DIAGNOSIS.           She has a history of hysterectomy for cervical cancer in 2015 in [place], [place]. Hyst was for abnormal pap. Incidental finding of cervical cancer on the hyst specimen. She does not know any other details.      Nov 2018: started concurrent chemoradiation.      Dec 2018: admitted with hypokalemia, hypomagnesemia and hypocalcemia. Seizure like activity. Worsening depression. Electrolyte replacement. Started on Abilify by psychiatry. S/p 4 cycles of cisplatin.     Jan 2019: completed "WPRT and 4 cycles of cisplatin. Received only 4 due to electrolyte abnormalities.     FEb 2019:PET:   Right common iliac lymph node, shows max SUV of 5.6 on today's exam versus 10.2 on prior exam.  A subcentimeter lymph node is seen in CT in this region.    The previously seen avid left axillary lymph node is no longer visualized is FDG avid.             Review of Systems   Constitutional: Positive for fatigue. Negative for chills and fever.   Respiratory: Negative for cough, shortness of breath and wheezing.    Cardiovascular: Negative for chest pain, palpitations and leg swelling.   Gastrointestinal: Negative for abdominal pain, constipation, diarrhea, nausea and vomiting.   Genitourinary: Negative for difficulty urinating, dysuria, frequency, genital sores, hematuria, urgency, vaginal bleeding, vaginal discharge and vaginal pain.   Musculoskeletal: Negative for gait problem.   Neurological: Positive for weakness and numbness (grade 1 finger tips).   Hematological: Negative for adenopathy. Does not bruise/bleed easily.   Psychiatric/Behavioral: The patient is not nervous/anxious.        Objective:   /65   Pulse 100   Ht 5' 8" (1.727 m)   Wt 104.5 kg (230 lb 6.1 oz)   LMP 06/08/2014 (Approximate)   BMI 35.03 kg/m²      Physical Exam   Constitutional: She is oriented to person, place, and time. She appears well-developed and well-nourished.   HENT:   Head: Normocephalic and atraumatic.   Eyes: No scleral " icterus.   Neck: No tracheal deviation present. No thyromegaly present.   Cardiovascular: Normal rate and regular rhythm.   Pulmonary/Chest: Effort normal and breath sounds normal.   Abdominal: Soft. She exhibits no distension and no mass. There is no tenderness. There is no rebound and no guarding. No hernia.   Genitourinary:   Genitourinary Comments: Not performed.    Musculoskeletal: She exhibits no edema or tenderness.   Lymphadenopathy:     She has no cervical adenopathy.   Neurological: She is alert and oriented to person, place, and time.   Skin: Skin is warm and dry.   Psychiatric: She has a normal mood and affect. Her behavior is normal. Judgment and thought content normal.       Assessment:       1. 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 History of cervical cancer        Plan:   Metastatic squamous cell carcinoma to lymph node    STEVEN    RTC in 3 months.   Repeat PET scan in 3 months to confirm STEVEN     -     NM PET CT Routine Skull to Mid Thigh; Future; Expected date: 05/19/2019    History of cervical cancer  -     NM PET CT Routine Skull to Mid Thigh; Future; Expected date: 05/19/2019

## 2019-02-18 ENCOUNTER — TELEPHONE (OUTPATIENT)
Dept: GYNECOLOGIC ONCOLOGY | Facility: CLINIC | Age: 54
End: 2019-02-18

## 2019-02-19 ENCOUNTER — OFFICE VISIT (OUTPATIENT)
Dept: GYNECOLOGIC ONCOLOGY | Facility: CLINIC | Age: 54
End: 2019-02-19
Payer: MEDICAID

## 2019-02-19 VITALS
SYSTOLIC BLOOD PRESSURE: 107 MMHG | DIASTOLIC BLOOD PRESSURE: 65 MMHG | HEIGHT: 68 IN | WEIGHT: 230.38 LBS | BODY MASS INDEX: 34.92 KG/M2 | HEART RATE: 100 BPM

## 2019-02-19 DIAGNOSIS — C77.9 METASTATIC SQUAMOUS CELL CARCINOMA TO LYMPH NODE: Primary | ICD-10-CM

## 2019-02-19 DIAGNOSIS — Z85.41 HISTORY OF CERVICAL CANCER: ICD-10-CM

## 2019-02-19 PROCEDURE — 99999 PR PBB SHADOW E&M-EST. PATIENT-LVL III: ICD-10-PCS | Mod: PBBFAC,,, | Performed by: OBSTETRICS & GYNECOLOGY

## 2019-02-19 PROCEDURE — 99213 OFFICE O/P EST LOW 20 MIN: CPT | Mod: PBBFAC | Performed by: OBSTETRICS & GYNECOLOGY

## 2019-02-19 PROCEDURE — 99214 OFFICE O/P EST MOD 30 MIN: CPT | Mod: S$PBB,,, | Performed by: OBSTETRICS & GYNECOLOGY

## 2019-02-19 PROCEDURE — 99999 PR PBB SHADOW E&M-EST. PATIENT-LVL III: CPT | Mod: PBBFAC,,, | Performed by: OBSTETRICS & GYNECOLOGY

## 2019-02-19 PROCEDURE — 99214 PR OFFICE/OUTPT VISIT, EST, LEVL IV, 30-39 MIN: ICD-10-PCS | Mod: S$PBB,,, | Performed by: OBSTETRICS & GYNECOLOGY

## 2019-02-26 ENCOUNTER — TELEPHONE (OUTPATIENT)
Dept: INFUSION THERAPY | Facility: HOSPITAL | Age: 54
End: 2019-02-26

## 2019-03-12 ENCOUNTER — OFFICE VISIT (OUTPATIENT)
Dept: RADIATION ONCOLOGY | Facility: CLINIC | Age: 54
End: 2019-03-12
Payer: MEDICAID

## 2019-03-12 VITALS
SYSTOLIC BLOOD PRESSURE: 136 MMHG | BODY MASS INDEX: 34.44 KG/M2 | WEIGHT: 232.5 LBS | RESPIRATION RATE: 16 BRPM | TEMPERATURE: 98 F | HEART RATE: 89 BPM | DIASTOLIC BLOOD PRESSURE: 71 MMHG | HEIGHT: 69 IN

## 2019-03-12 DIAGNOSIS — C77.9 METASTATIC SQUAMOUS CELL CARCINOMA TO LYMPH NODE: Primary | ICD-10-CM

## 2019-03-12 DIAGNOSIS — Z85.41 HISTORY OF CERVICAL CANCER: ICD-10-CM

## 2019-03-12 PROCEDURE — 99999 PR PBB SHADOW E&M-EST. PATIENT-LVL III: CPT | Mod: PBBFAC,,, | Performed by: RADIOLOGY

## 2019-03-12 PROCEDURE — 99213 OFFICE O/P EST LOW 20 MIN: CPT | Mod: PBBFAC | Performed by: RADIOLOGY

## 2019-03-12 PROCEDURE — 99999 PR PBB SHADOW E&M-EST. PATIENT-LVL III: ICD-10-PCS | Mod: PBBFAC,,, | Performed by: RADIOLOGY

## 2019-03-12 PROCEDURE — 99213 OFFICE O/P EST LOW 20 MIN: CPT | Mod: S$PBB,,, | Performed by: RADIOLOGY

## 2019-03-12 PROCEDURE — 99213 PR OFFICE/OUTPT VISIT, EST, LEVL III, 20-29 MIN: ICD-10-PCS | Mod: S$PBB,,, | Performed by: RADIOLOGY

## 2019-03-12 NOTE — PROGRESS NOTES
REFERRING PHYSICIAN: Sebas Reed MD    DIAGNOSIS: History of cervical cancer now with pelvic lymph node recurrence     Radiation Treatment Summary:  Ms. Riley completed radiation to the pelvis and periaortic lymph nodes concurrent with chemotherapy as shown below.    Treatment Site Energy Dose/Fx (Gy) #Fx Total Dose (Gy) Start Date End Date   PELVIS 6X 1.8 25 / 25 45 11/7/2018 12/17/2018   Right LN BOOST 6X 1.8 8 / 8 14.4 12/18/2018 1/2/2019   Vaginal cuff/ upper vagina HDR 5 3/3 15 12/19/2018 1/3/2019     INTERVAL HISTORY:   Ms. Riley is a 54-year-old female with history fibromyalgia, major depressive disorder, stroke at the age of 7, and cervical cancer status post hysterectomy in 2015 in [place], [place] who was recently found to have a pelvic lymph node recurrence after evaluation for generalized malaise and abdominal pain. Workup with CT of the abdomen and pelvis on July 24, 2018 revealed an enlarged lymph node at the bifurcation of the right common iliac measuring 2 cm. There was also mild enlargement of the liver with hypoattenuation suggestive of steatosis. A PET scan on August 31, 2018 revealed hypermetabolic activity associated with the right iliac lymph node with SUV max of 10. There are low-grade axillary and inguinal lymph nodes noted with SUV max less than 2. There is no signs of metastatic disease. On is September 18, 2018, she underwent incisional biopsy of this lesion with pathology revealing metastatic high-grade carcinoma with squamous cell features most likely GYN origin. A Pap smear on October 12, 2018 is negative for malignancy. She also underwent evaluation by Urology which was negative for any urological origin. She received definitive radiation to the pelvis followed by a boost to the right iliac region concurrent with chemotherapy as above.  She is here today for a routine follow-up.    Since completion of radiation, she underwent a PET scan on February 13, 2019 which reveals  "decrease in the uptake involving the right common iliac lymph node down to 5.6 compared to 10.2 previously.  The previously seen left axillary lymph node is no longer visualized.  She is planned to repeat a PET scan on May 22, 2019.  At present, she reports occasional loose stools which was present prior to her treatment.  She denies nausea, vomiting, vaginal bleeding, vaginal discharge, dysuria, hematuria, fever, night sweats, or weight loss.    PHYSICAL EXAMINATION:  VITAL SINGS: /71   Pulse 89   Temp 97.5 °F (36.4 °C) (Oral)   Resp 16   Ht 5' 9" (1.753 m)   Wt 105.5 kg (232 lb 8 oz)   LMP 06/08/2014 (Approximate)   BMI 34.33 kg/m²   GENERAL: Patient is alert and oriented, in no acute distress.  HEENT: Extraocular muscles are intact.  Oropharynx is clear without lesions.  There is no cervical or supraclavicular adenopathy palpated.    CHEST: Breath sounds clear bilaterally.  No rales.  No rhonchi.  Unlabored respirations.  CARDIOVASCULAR: Normal S1, S2.  Normal rate.  Regular rhythm.  ABDOMEN: Bowel sounds normal.  No tenderness.  No abdominal distention.  No hepatomegaly.  No splenomegaly.  PELVIC EXAM:  A speculum examination reveals intact vaginal cuff with no suspicious lesions.  EXTREMITIES: No clubbing, cyanosis, edema  NEUROLOGIC: Cranial nerves II through XII are grossly intact.  Sensation is intact.  Strength is 5 out of 5 in the upper and lower extremities bilaterally.    ASSESSMENT:   This is a 54-year-old female with history of cervical cancer in 2015 who underwent hysterectomy at an outside facility who was recently found to have pelvic lymphadenopathy with incisional biopsy on September 18, 2018 revealing high-grade carcinoma with squamous cell features, for which she completed concurrent chemotherapy and pelvic and vaginal cuff irradiation.    PLAN:   Ms. Riley is recovering from the radiation without any unexpected side effects.  Her PET scan post treatment was done approximately " 1 month after completion of radiation.  The uptake noted in the right common iliac lymph node has decreased down to 5, previously 10, which I suspect is still post radiation inflammatory response  She is planned for repeat PET scan in May 22, 2019.  I plan to follow up with the results of that.  She will continue follow-up with Dr. Quiles as planned.  I plan to see her back in approximately 4 months, unless symptoms warrant otherwise.

## 2019-03-12 NOTE — PATIENT INSTRUCTIONS
Repeat PET Scan as scheduled on May 20, 2019 and follow up with Dr. Lemon as planned.   Return to see me in 4 months.

## 2019-03-13 ENCOUNTER — DOCUMENTATION ONLY (OUTPATIENT)
Dept: RADIATION ONCOLOGY | Facility: CLINIC | Age: 54
End: 2019-03-13

## 2019-03-18 ENCOUNTER — OFFICE VISIT (OUTPATIENT)
Dept: PSYCHIATRY | Facility: CLINIC | Age: 54
End: 2019-03-18
Payer: MEDICAID

## 2019-03-18 DIAGNOSIS — F33.2 SEVERE RECURRENT MAJOR DEPRESSION WITHOUT PSYCHOTIC FEATURES: Primary | ICD-10-CM

## 2019-03-18 DIAGNOSIS — F41.1 GENERALIZED ANXIETY DISORDER: ICD-10-CM

## 2019-03-18 PROCEDURE — 90834 PR PSYCHOTHERAPY W/PATIENT, 45 MIN: ICD-10-PCS | Mod: HP,HB,S$PBB, | Performed by: PSYCHOLOGIST

## 2019-03-18 PROCEDURE — 99999 PR PBB SHADOW E&M-EST. PATIENT-LVL II: CPT | Mod: PBBFAC,,, | Performed by: PSYCHOLOGIST

## 2019-03-18 PROCEDURE — 90834 PSYTX W PT 45 MINUTES: CPT | Mod: PBBFAC | Performed by: PSYCHOLOGIST

## 2019-03-18 PROCEDURE — 99212 OFFICE O/P EST SF 10 MIN: CPT | Mod: PBBFAC,25 | Performed by: PSYCHOLOGIST

## 2019-03-18 PROCEDURE — 90834 PSYTX W PT 45 MINUTES: CPT | Mod: HP,HB,S$PBB, | Performed by: PSYCHOLOGIST

## 2019-03-18 PROCEDURE — 99999 PR PBB SHADOW E&M-EST. PATIENT-LVL II: ICD-10-PCS | Mod: PBBFAC,,, | Performed by: PSYCHOLOGIST

## 2019-03-18 NOTE — PROGRESS NOTES
PSYCHO-ONCOLOGY NOTE/ Individual Psychotherapy     Date: 3/18/2019   [facility]        Therapeutic Intervention: Met with patient.  Outpatient - Behavior modifying psychotherapy 45 min - CPT code 53698    The patient's last visit with me was on 2/12/19.     Problem list  Patient Active Problem List   Diagnosis    Osteoarthritis of back    Hiatal hernia    Essential hypertension    Lower extremity pain, diffuse    Weakness of both lower extremities    Impaired functional mobility, balance, gait, and endurance    Schatzki's ring    Colon polyp    Internal hemorrhoids    Cardiovascular event risk -- low    Hemifacial spasm    Severe episode of recurrent major depressive disorder, with psychotic features    Fibromyalgia    Facial palsy    Blood in stool    Sleep stage dysfunction    Carpal tunnel syndrome on right    Weakness    Difficulty walking    Gastroesophageal reflux disease with esophagitis    Abnormal CT of the abdomen    Lymphadenopathy    History of cervical cancer    Metastatic squamous cell carcinoma to lymph node    Severe recurrent major depression without psychotic features    Generalized anxiety disorder    PTSD (post-traumatic stress disorder)    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 drug    Diarrhea due to malabsorption    Hypomagnesemia    Hypomagnesemia    Seizure-like activity    Stroke    Electrolyte disorder       Chief complaint/reason for encounter: depression and anxiety   Met with patient to evaluate psychosocial adaptation to diagnosis/treatment/survivorship of metastatic cervical cancer    Current Medications  Current Outpatient Medications   Medication    ARIPiprazole (ABILIFY) 2 MG Tab    arm brace (NEOPRENE WRIST SPLINT SUPPORT) Misc    cholecalciferol, vitamin D3, 2,000 unit Cap    cyclobenzaprine (FLEXERIL) 10 MG tablet    diphenoxylate-atropine 2.5-0.025 mg (LOMOTIL) 2.5-0.025 mg per tablet    DULoxetine (CYMBALTA) 60 MG capsule     gabapentin (NEURONTIN) 300 MG capsule    lisinopril 10 MG tablet    magnesium oxide (MAG-OX) 400 mg (241.3 mg magnesium) tablet    meclizine (ANTIVERT) 25 mg tablet    omeprazole (PRILOSEC) 40 MG capsule    oxyCODONE-acetaminophen (PERCOCET) 5-325 mg per tablet    potassium chloride SA (K-DUR,KLOR-CON) 20 MEQ tablet    traMADol (ULTRAM) 50 mg tablet    traZODone (DESYREL) 50 MG tablet     No current facility-administered medications for this visit.        Objective:  Edita Riley arrived promptly for the session.  Ms. Riley was independently ambulatory at the time of session. The patient was fully cooperative throughout the session.  Appearance: age appropriate, casually  dressed, adequately  groomed  Behavior/Cooperation: friendly and cooperative  Speech: slowed, slightly slurred  Mood: euthymic  Affect: blunted  Thought Process: goal-directed, logical  Thought Content: normal,  No delusions or paranoia; did not appear to be responding to internal stimuli during the session  Orientation: grossly intact  Memory: Grossly intact  Attention Span/Concentration: Attends to session without distraction; reports no difficulty  Fund of Knowledge: average  Estimate of Intelligence: average from verbal skills and history  Cognition: grossly intact  Insight: patient has awareness of illness; good insight into own behavior and behavior of others  Judgment: the patient's behavior is adequate to circumstances    Interval history and content of current session: Patient discussed continued improvement in emotional and physical functioning. Discussed current adaptation to disease and treatment status and family's adaptation to disease and treatment status. Reports to be coping in an adequate manner. She reports her family is coping well, despite recent 1 year anniversary of daughter's death.  Patient supported by sophia community through the anniversary.    Discussed medication adherence with patient. She is  taking her Cymbalta and trazodone, regularly. She is not taking Abilify, but denies experiencing hallucinations at the present time. Reinforced the importance of setting up follow-up appt with [facility] to ensure access to psychotropic medications.     Identified and evaluated psychosocial and environmental stressors secondary to diagnosis and treatment.  Patient discussed decreased stamina/increased fatigue (sleeping 13-14 hours/day). Discussed setting small daily goals to increase activity level. Patient continues to take trazodone for sleep. Encouraged her to discuss this with psychiatry to determine continued need for this medication. Patient has returned to [employer] activities and regular walking.       Risk parameters:   Patient reports no homicidal ideation  Patient reports no self-injurious behavior  Patient reports no violent behavior   Patient reports mild suicidal ideation without intent or plan   Safety needs:  None at this time      Verbal deficits: slight slurring, no word-finding difficulty during today's visit     Patient's response to intervention:The patient's response to intervention is accepting.     Progress toward goals and other mental status changes:  The patient's progress toward goals is fair .      Progress to date:Progress as Expected      Goals from last visit: Attempted, partially met      Patient reported outcomes:  Distress Thermometer:   Distress Score    Distress Score: 5        Practical Problems Physical Problems                                                   Family Problems                                         Emotional Problems                                                         Spiritual/Religions Concerns               Other Problems             PHQ-9=  16; 26 at intake   QUOC-7= 9; 19 at intake      Client Strengths: good social support, commitment to wellness    Diagnosis:     ICD-10-CM ICD-9-CM   1. Generalized anxiety disorder F41.1 300.02   2. Severe recurrent major  depression without psychotic features F33.2 296.33       · Treatment Plan:individual psychotherapy and consult psychiatrist for medication evaluation  ([facility])  · Target symptoms: depression, anxiety   · Why chosen therapy is appropriate versus another modality: relevant to diagnosis, evidence based practice  · Outcome monitoring methods: self-report, checklist/rating scale  · Therapeutic intervention type: behavior modifying psychotherapy  · Prognosis: Fair      Behavioral goals:    Exercise:  Get pedometer to set daily goals   Stress management:    Social engagement: continue Jain activities   Nutrition:   Smoking Cessation:   Therapy:  Call [facility] now for psychiatry appt     Return to clinic: 1 month     Length of Service (minutes direct face-to-face contact): 45    Enoc Martinez, PhD  [place] License #[id number]

## 2019-04-15 ENCOUNTER — OFFICE VISIT (OUTPATIENT)
Dept: INTERNAL MEDICINE | Facility: CLINIC | Age: 54
End: 2019-04-15
Payer: MEDICAID

## 2019-04-15 ENCOUNTER — LAB VISIT (OUTPATIENT)
Dept: LAB | Facility: HOSPITAL | Age: 54
End: 2019-04-15
Payer: MEDICAID

## 2019-04-15 ENCOUNTER — TELEPHONE (OUTPATIENT)
Dept: INTERNAL MEDICINE | Facility: CLINIC | Age: 54
End: 2019-04-15

## 2019-04-15 VITALS
BODY MASS INDEX: 35.17 KG/M2 | WEIGHT: 237.44 LBS | HEIGHT: 69 IN | HEART RATE: 83 BPM | DIASTOLIC BLOOD PRESSURE: 75 MMHG | OXYGEN SATURATION: 97 % | SYSTOLIC BLOOD PRESSURE: 115 MMHG

## 2019-04-15 DIAGNOSIS — E87.8 ELECTROLYTE ABNORMALITY: ICD-10-CM

## 2019-04-15 DIAGNOSIS — F51.01 PRIMARY INSOMNIA: ICD-10-CM

## 2019-04-15 DIAGNOSIS — K21.00 GASTROESOPHAGEAL REFLUX DISEASE WITH ESOPHAGITIS: ICD-10-CM

## 2019-04-15 DIAGNOSIS — R07.89 CHEST DISCOMFORT: Primary | ICD-10-CM

## 2019-04-15 DIAGNOSIS — R07.89 OTHER CHEST PAIN: ICD-10-CM

## 2019-04-15 LAB
ANION GAP SERPL CALC-SCNC: 7 MMOL/L (ref 8–16)
BUN SERPL-MCNC: 15 MG/DL (ref 6–20)
CALCIUM SERPL-MCNC: 10.1 MG/DL (ref 8.7–10.5)
CHLORIDE SERPL-SCNC: 105 MMOL/L (ref 95–110)
CO2 SERPL-SCNC: 28 MMOL/L (ref 23–29)
CREAT SERPL-MCNC: 1 MG/DL (ref 0.5–1.4)
EST. GFR  (AFRICAN AMERICAN): >60 ML/MIN/1.73 M^2
EST. GFR  (NON AFRICAN AMERICAN): >60 ML/MIN/1.73 M^2
GLUCOSE SERPL-MCNC: 98 MG/DL (ref 70–110)
MAGNESIUM SERPL-MCNC: 1.9 MG/DL (ref 1.6–2.6)
POTASSIUM SERPL-SCNC: 4 MMOL/L (ref 3.5–5.1)
SODIUM SERPL-SCNC: 140 MMOL/L (ref 136–145)

## 2019-04-15 PROCEDURE — 93010 ELECTROCARDIOGRAM REPORT: CPT | Mod: S$PBB,,, | Performed by: INTERNAL MEDICINE

## 2019-04-15 PROCEDURE — 99213 PR OFFICE/OUTPT VISIT, EST, LEVL III, 20-29 MIN: ICD-10-PCS | Mod: S$PBB,,, | Performed by: STUDENT IN AN ORGANIZED HEALTH CARE EDUCATION/TRAINING PROGRAM

## 2019-04-15 PROCEDURE — 93010 EKG 12-LEAD: ICD-10-PCS | Mod: S$PBB,,, | Performed by: INTERNAL MEDICINE

## 2019-04-15 PROCEDURE — 99213 OFFICE O/P EST LOW 20 MIN: CPT | Mod: S$PBB,,, | Performed by: STUDENT IN AN ORGANIZED HEALTH CARE EDUCATION/TRAINING PROGRAM

## 2019-04-15 PROCEDURE — 36415 COLL VENOUS BLD VENIPUNCTURE: CPT

## 2019-04-15 PROCEDURE — 99999 PR PBB SHADOW E&M-EST. PATIENT-LVL IV: ICD-10-PCS | Mod: PBBFAC,,, | Performed by: STUDENT IN AN ORGANIZED HEALTH CARE EDUCATION/TRAINING PROGRAM

## 2019-04-15 PROCEDURE — 99999 PR PBB SHADOW E&M-EST. PATIENT-LVL IV: CPT | Mod: PBBFAC,,, | Performed by: STUDENT IN AN ORGANIZED HEALTH CARE EDUCATION/TRAINING PROGRAM

## 2019-04-15 PROCEDURE — 93005 ELECTROCARDIOGRAM TRACING: CPT | Mod: PBBFAC | Performed by: INTERNAL MEDICINE

## 2019-04-15 PROCEDURE — 83735 ASSAY OF MAGNESIUM: CPT

## 2019-04-15 PROCEDURE — 80048 BASIC METABOLIC PNL TOTAL CA: CPT

## 2019-04-15 PROCEDURE — 99214 OFFICE O/P EST MOD 30 MIN: CPT | Mod: PBBFAC,25 | Performed by: STUDENT IN AN ORGANIZED HEALTH CARE EDUCATION/TRAINING PROGRAM

## 2019-04-15 RX ORDER — POTASSIUM CHLORIDE 20 MEQ/1
TABLET, EXTENDED RELEASE ORAL DAILY
Qty: 30 TABLET | Refills: 1 | Status: SHIPPED | OUTPATIENT
Start: 2019-04-15 | End: 2019-04-15 | Stop reason: CLARIF

## 2019-04-15 RX ORDER — OMEPRAZOLE 40 MG/1
40 CAPSULE, DELAYED RELEASE ORAL DAILY
Qty: 90 CAPSULE | Refills: 3 | Status: SHIPPED | OUTPATIENT
Start: 2019-04-15 | End: 2019-09-19 | Stop reason: SDUPTHER

## 2019-04-15 RX ORDER — DULOXETIN HYDROCHLORIDE 60 MG/1
60 CAPSULE, DELAYED RELEASE ORAL DAILY
Qty: 90 CAPSULE | Refills: 3 | Status: SHIPPED | OUTPATIENT
Start: 2019-04-15 | End: 2020-05-13

## 2019-04-15 RX ORDER — TRAZODONE HYDROCHLORIDE 50 MG/1
50 TABLET ORAL NIGHTLY
Qty: 90 TABLET | Refills: 3 | Status: CANCELLED | OUTPATIENT
Start: 2019-04-15 | End: 2020-04-14

## 2019-04-15 RX ORDER — LISINOPRIL 10 MG/1
10 TABLET ORAL DAILY
Qty: 90 TABLET | Refills: 3 | Status: SHIPPED | OUTPATIENT
Start: 2019-04-15 | End: 2019-09-19 | Stop reason: SDUPTHER

## 2019-04-15 NOTE — PROGRESS NOTES
"Edita Riley  1965        Subjective     Chief Complaint: chest discomfort    History of Present Illness:  Ms. Edita Riley is a 54 y.o. female with metastatic squamous cell carcinoma (followed by Dr. Lemon with GynOnc), depression, HTN, fibromyalgia and chronic back pain presents for evaluation of chest discomfort. She states that it has been occurring for the past month. She describes the chest discomfort as 9/10 "burning" sensation located in the Mid-sternal which occasionally radiates to the back.  She reports that it is associated with nocturnal cough and SOB.The burning is worse with when laying down and with spicy foods. She believes that the chest discomfort is associated with food; however, she states that she occasionally feels this discomfort when going up a flight of stairs. She states that she ran out of her omeprazole 1 month ago and reported that the symptoms started shortly after this.    She does not have history of unknown cardiac disease. She follows with gyn onc and radiation oncology. She has undergone 4 cycles of cisplatin and concurrent radiation.    Review of Systems   Constitutional: Negative for chills and fever.   HENT: Negative for hearing loss and sore throat.    Eyes: Negative for blurred vision and double vision.   Respiratory: Positive for cough and shortness of breath.    Cardiovascular: Negative for chest pain and leg swelling.   Gastrointestinal: Negative for constipation, diarrhea, nausea and vomiting.   Genitourinary: Negative for dysuria and hematuria.   Musculoskeletal: Negative for falls and myalgias.   Skin: Negative for rash.   Neurological: Negative for dizziness, speech change, weakness and headaches.   Psychiatric/Behavioral: Negative for depression. The patient is not nervous/anxious.        PAST HISTORY:     Past Medical History:   Diagnosis Date    Anxiety     Cervical cancer 2014    Chronic back pain     Depression     Diarrhea due " to malabsorption 11/14/2018    Fibromyalgia     GERD (gastroesophageal reflux disease)     Hiatal hernia 2014    History of cervical cancer 10/11/2018    Hx of psychiatric care     Cymbalta, trazodone    Hypertension     Hypomagnesemia 11/21/2018    Lactose intolerance     Metastatic squamous cell carcinoma to lymph node 10/11/2018    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 drug 11/14/2018    Osteoarthritis of back     Psychiatric problem     Stroke 1972       Past Surgical History:   Procedure Laterality Date    BILATERAL OOPHORECTOMY  2015    CHOLECYSTECTOMY  11/09/2016    Done at [facility], path showed chronic cholecystitis and gallstones    CHOLECYSTECTOMY-LAPAROSCOPIC N/A 11/9/2016    Performed by Joshua [facility], [facility] at [facility] OR 2ND FLR    cold knife conization  2014    COLONOSCOPY  2014    COLONOSCOPY N/A 5/18/2018    Performed by Arden Gutiérrez MD at [facility] ENDO (4TH FLR)    COLONOSCOPY N/A 10/24/2016    Per[facility]den [facility]rez [facility] at [facility] (4TH FLR)    ESOPHAGOGASTRODUODENOSCOPY  2014    ESOPHAGOGASTRODUODENOSCOPY (EGD) N/A 10/24/2016    Performed by Arden [facility]rez [facility] at [facility] (4TH FLR)    HYSTERECTOMY  2014    [facility] for cervical [facility]    LYMPHADENECTOMY, RETROPERITONEUM Right 9/17/2018    Performed by Sebas Reed [facility] at [facility] OR 2ND FLR    OOPHORECTOMY         Family History   Problem Relation Age of Onset    Heart disease Sister     Heart disease Maternal Grandmother     Colon cancer Father     Esophageal cancer Father     Cancer Father         Lung-smoker    Cancer Mother         Cervical    Cervical cancer Mother     Breast cancer Maternal Aunt     Suicide Daughter         jumped from parking structure    Drug abuse Daughter     Drug abuse Daughter     Rectal cancer Neg Hx     Stomach cancer Neg Hx     Crohn's disease Neg Hx     Ulcerative colitis Neg Hx     Diabetes Neg Hx     Hypertension Neg Hx        Social History     Socioeconomic History     Marital status:      Spouse name: Hammad    Number of children: 2    Years of education: Not on file    Highest education level: Not on file   Occupational History    Not on file   Social Needs    Financial resource strain: Not on file    Food insecurity:     Worry: Not on file     Inability: Not on file    Transportation needs:     Medical: Not on file     Non-medical: Not on file   Tobacco Use    Smoking status: Never Smoker    Smokeless tobacco: Never Used   Substance and Sexual Activity    Alcohol use: No     Alcohol/week: 0.0 oz    Drug use: No    Sexual activity: Yes     Partners: Male     Birth control/protection: None     Comment:  19 years since    Lifestyle    Physical activity:     Days per week: Not on file     Minutes per session: Not on file    Stress: Not on file   Relationships    Social connections:     Talks on phone: Not on file     Gets together: Not on file     Attends Worship service: Not on file     Active member of club or organization: Not on file     Attends meetings of clubs or organizations: Not on file     Relationship status: Not on file   Other Topics Concern    Patient feels they ought to cut down on drinking/drug use Not Asked    Patient annoyed by others criticizing their drinking/drug use Not Asked    Patient has felt bad or guilty about drinking/drug use Not Asked    Patient has had a drink/used drugs as an eye opener in the AM Not Asked   Social History Narrative    , twin daughters (1  2018), disabled due to childhood stroke, Shinto sophia       MEDICATIONS & ALLERGIES:     Current Outpatient Medications on File Prior to Visit   Medication Sig    ARIPiprazole (ABILIFY) 2 MG Tab Take 1 tablet (2 mg total) by mouth once daily.    arm brace (NEOPRENE WRIST SPLINT SUPPORT) Misc 1 Units by Misc.(Non-Drug; Combo Route) route every evening.    cholecalciferol, vitamin D3, 2,000 unit Cap Take 1 capsule by mouth  "once daily.    cyclobenzaprine (FLEXERIL) 10 MG tablet TAKE 1 TABLET (10 MG TOTAL) BY MOUTH NIGHTLY AS NEEDED FOR MUSCLE SPASMS.    diphenoxylate-atropine 2.5-0.025 mg (LOMOTIL) 2.5-0.025 mg per tablet Take 1 after each bowel movement. Up to 8 per day.    DULoxetine (CYMBALTA) 60 MG capsule TAKE 1 CAPSULE (60 MG TOTAL) BY MOUTH ONCE DAILY.    gabapentin (NEURONTIN) 300 MG capsule TAKE 1 CAPSULE (300 MG TOTAL) BY MOUTH 2 (TWO) TIMES DAILY.    lisinopril 10 MG tablet TAKE 1 TABLET (10 MG TOTAL) BY MOUTH ONCE DAILY.    magnesium oxide (MAG-OX) 400 mg (241.3 mg magnesium) tablet Take 1 tablet (400 mg total) by mouth 3 (three) times daily.    meclizine (ANTIVERT) 25 mg tablet Take 1 tablet (25 mg total) by mouth 3 (three) times daily as needed for Dizziness.    omeprazole (PRILOSEC) 40 MG capsule Take 1 capsule (40 mg total) by mouth once daily.    oxyCODONE-acetaminophen (PERCOCET) 5-325 mg per tablet Take 1 tablet by mouth every 4 (four) hours as needed.    potassium chloride SA (K-DUR,KLOR-CON) 20 MEQ tablet Take 1 tablet by mouth once daily.    traMADol (ULTRAM) 50 mg tablet Take 50 mg by mouth every 6 (six) hours as needed for Pain.    traZODone (DESYREL) 50 MG tablet Take 1 tablet (50 mg total) by mouth every evening.     No current facility-administered medications on file prior to visit.        Review of patient's allergies indicates:   Allergen Reactions    Bee sting [allergen ext-venom-honey bee]      Rash      Grass pollen-bermuda, standard      rash       OBJECTIVE:     Vital Signs:  Vitals:    04/15/19 0938   BP: 115/75   BP Location: Left arm   Patient Position: Sitting   BP Method: Large (Manual)   Pulse: 83   SpO2: 97%   Weight: 107.7 kg (237 lb 7 oz)   Height: 5' 9" (1.753 m)       Body mass index is 35.06 kg/m².     Physical Exam:  General:  Well developed, well nourished, no acute distress  Head: Normocephalic, atraumatic  Eyes: PERRL, EOMI, clear sclera  Throat: No posterior pharyngeal " "erythema or exudate, no tonsillar exudate  Neck: supple, normal ROM, no thyromegaly   CVS:  RRR, S1 and S2 normal, no murmurs, rubs, gallops, 2+ peripheral pulses  Resp:  Lungs clear to auscultation, no wheezes, rales, rhonchi, cough  GI:  Abdomen soft, non-tender, non-distended, normoactive bowel sounds  MSK:  No muscle atrophy, cyanosis, peripheral edema   Skin:  No rashes, ulcers, erythema  Neuro:  CNII-XII grossly intact, no focal deficits noted  Psych:  Appropriate mood and affect, normal judgement    Laboratory  Lab Results   Component Value Date    WBC 3.55 (L) 12/06/2018    HGB 9.9 (L) 12/06/2018    HCT 31.1 (L) 12/06/2018    MCV 85 12/06/2018     (L) 12/06/2018     Lab Results   Component Value Date     (H) 02/04/2019     02/04/2019    K 4.2 02/04/2019     02/04/2019    CO2 26 02/04/2019    BUN 18 02/04/2019    CREATININE 1.3 02/04/2019    CALCIUM 9.4 02/04/2019    MG 1.7 02/04/2019     Lab Results   Component Value Date    INR 1.0 12/02/2018     Lab Results   Component Value Date    HGBA1C 5.6 11/16/2017       ASSESSMENT & PLAN:   Ms. Edita Riley is a 54 y.o. female    1. Chest Discomfort  Patient complains of chest discomfort described as a "burning"  Associated with noctural cough and shortness of breath  Etiology likely due to GERD, however given reported associated SOB can not rule out CAD  -In office EKG- normal sinus, no ST elevations or ischemic changes  -will order Stress echo to rule of ischemic causes  -will refill omeprazole    2. Electrolyte abnormality  History of electrolyte abnormalities from previous hospitalization in Dec 2018 where was found to have hypokalemia and hypomagnesia due to nausea and vomiting likely chemo induced  Currently on Potassium supplements  -will get BMP to assess for need to continue K+ supplements  - Basic metabolic panel; Future  - Magnesium; Future      RTC in 3 months    Discussed with Dr. Tawana Mustafa " MD  Internal Medicine PGY1  [facility]  [address] [place] [address]  [phone]

## 2019-04-15 NOTE — TELEPHONE ENCOUNTER
Spoke with patient and notified that lab results for BMP and hemoglobin A1C were within normal limits. Also notified that potassium was in normal limits.    4/15/19

## 2019-04-17 ENCOUNTER — HOSPITAL ENCOUNTER (OUTPATIENT)
Dept: CARDIOLOGY | Facility: CLINIC | Age: 54
Discharge: HOME | End: 2019-04-17
Attending: STUDENT IN AN ORGANIZED HEALTH CARE EDUCATION/TRAINING PROGRAM
Payer: MEDICAID

## 2019-04-17 VITALS — HEIGHT: 69 IN | BODY MASS INDEX: 35.1 KG/M2 | WEIGHT: 237 LBS

## 2019-04-17 DIAGNOSIS — R07.89 CHEST DISCOMFORT: ICD-10-CM

## 2019-04-17 LAB
ASCENDING AORTA: 2.78 CM
AV INDEX (PROSTH): 0.82
AV MEAN GRADIENT: 4.41 MMHG
AV PEAK GRADIENT: 7.95 MMHG
AV VALVE AREA: 2.54 CM2
AV VELOCITY RATIO: 0.81
BSA FOR ECHO PROCEDURE: 2.29 M2
CV ECHO LV RWT: 0.37 CM
CV STRESS BASE HR: 81 BPM
DIASTOLIC BLOOD PRESSURE: 73 MMHG
DOP CALC AO PEAK VEL: 1.41 M/S
DOP CALC AO VTI: 30.33 CM
DOP CALC LVOT AREA: 3.08 CM2
DOP CALC LVOT DIAMETER: 1.98 CM
DOP CALC LVOT PEAK VEL: 1.15 M/S
DOP CALC LVOT STROKE VOLUME: 76.94 CM3
DOP CALCLVOT PEAK VEL VTI: 25 CM
E WAVE DECELERATION TIME: 219.17 MSEC
E/A RATIO: 1
E/E' RATIO: 7.44
ECHO LV POSTERIOR WALL: 0.78 CM (ref 0.6–1.1)
FRACTIONAL SHORTENING: 38 % (ref 28–44)
INTERVENTRICULAR SEPTUM: 0.79 CM (ref 0.6–1.1)
IVRT: 0.08 MSEC
LA MAJOR: 5.22 CM
LA MINOR: 5.26 CM
LA WIDTH: 4.29 CM
LEFT ATRIUM SIZE: 3.54 CM
LEFT ATRIUM VOLUME INDEX: 30.5 ML/M2
LEFT ATRIUM VOLUME: 67.64 CM3
LEFT INTERNAL DIMENSION IN SYSTOLE: 2.63 CM (ref 2.1–4)
LEFT VENTRICLE DIASTOLIC VOLUME INDEX: 36.1 ML/M2
LEFT VENTRICLE DIASTOLIC VOLUME: 80.14 ML
LEFT VENTRICLE MASS INDEX: 45.2 G/M2
LEFT VENTRICLE SYSTOLIC VOLUME INDEX: 11.4 ML/M2
LEFT VENTRICLE SYSTOLIC VOLUME: 25.38 ML
LEFT VENTRICULAR INTERNAL DIMENSION IN DIASTOLE: 4.24 CM (ref 3.5–6)
LEFT VENTRICULAR MASS: 100.36 G
LV LATERAL E/E' RATIO: 6.7
LV SEPTAL E/E' RATIO: 8.38
MV PEAK A VEL: 0.67 M/S
MV PEAK E VEL: 0.67 M/S
OHS CV CPX 1 MINUTE RECOVERY HEART RATE: 121 BPM
OHS CV CPX 85 PERCENT MAX PREDICTED HEART RATE MALE: 133
OHS CV CPX ESTIMATED METS: 6
OHS CV CPX MAX PREDICTED HEART RATE: 157
OHS CV CPX PATIENT IS FEMALE: 1
OHS CV CPX PATIENT IS MALE: 0
OHS CV CPX PEAK DIASTOLIC BLOOD PRESSURE: 96 MMHG
OHS CV CPX PEAK HEAR RATE: 150 BPM
OHS CV CPX PEAK RATE PRESSURE PRODUCT: NORMAL
OHS CV CPX PEAK SYSTOLIC BLOOD PRESSURE: 186 MMHG
OHS CV CPX PERCENT MAX PREDICTED HEART RATE ACHIEVED: 96
OHS CV CPX PERCENT TARGET HEART RATE ACHIEVED: 112.78
OHS CV CPX RATE PRESSURE PRODUCT PRESENTING: NORMAL
OHS CV CPX TARGET HEART RATE: 133
PISA TR MAX VEL: 2.6 M/S
PULM VEIN S/D RATIO: 1.13
PV PEAK D VEL: 0.56 M/S
PV PEAK S VEL: 0.63 M/S
RA MAJOR: 4.8 CM
RA WIDTH: 3.71 CM
RIGHT VENTRICULAR END-DIASTOLIC DIMENSION: 3.35 CM
RV TISSUE DOPPLER FREE WALL SYSTOLIC VELOCITY 1 (APICAL 4 CHAMBER VIEW): 11.95 M/S
SINUS: 3.09 CM
STJ: 2.69 CM
STRESS ECHO POST EXERCISE DUR MIN: 4 MIN
STRESS ECHO POST EXERCISE DUR SEC: 0
SYSTOLIC BLOOD PRESSURE: 125 MMHG
TDI LATERAL: 0.1
TDI SEPTAL: 0.08
TDI: 0.09
TR MAX PG: 27.04 MMHG
TRICUSPID ANNULAR PLANE SYSTOLIC EXCURSION: 2.13 CM

## 2019-04-17 PROCEDURE — 93351 STRESS TTE COMPLETE: CPT | Mod: PBBFAC | Performed by: INTERNAL MEDICINE

## 2019-04-17 PROCEDURE — 93351 ECHOCARDIOGRAM STRESS TEST WITH COLOR FLOW DOPPLER (CUPID ONLY): ICD-10-PCS | Mod: 26,S$PBB,, | Performed by: INTERNAL MEDICINE

## 2019-04-24 ENCOUNTER — TELEPHONE (OUTPATIENT)
Dept: GYNECOLOGIC ONCOLOGY | Facility: CLINIC | Age: 54
End: 2019-04-24

## 2019-04-24 DIAGNOSIS — C77.9 METASTATIC SQUAMOUS CELL CARCINOMA TO LYMPH NODE: Primary | ICD-10-CM

## 2019-05-17 ENCOUNTER — TELEPHONE (OUTPATIENT)
Dept: GYNECOLOGIC ONCOLOGY | Facility: CLINIC | Age: 54
End: 2019-05-17

## 2019-05-17 NOTE — TELEPHONE ENCOUNTER
Spoke with pt. Pt informed pet scan and blood work was rescheduled for Monday 5/20/19 due to the pre authorization department waiting on approval from insurance company. Pt informed of her appt times, no fast four hours before scans. Pt voiced understanding

## 2019-05-17 NOTE — TELEPHONE ENCOUNTER
----- Message from Nishicuco Ramos sent at 5/17/2019  2:05 PM CDT -----  Contact: EMMA SWARTZ [[id number]]  Name of Who is Calling: EMMA SWARTZ [[id number]]      What is the request in detail: Please call the patient regarding her PET Scan.       Can the clinic reply by [facility]:no      What Number to [facility] Back [facility]: [phone]

## 2019-05-20 ENCOUNTER — HOSPITAL ENCOUNTER (OUTPATIENT)
Dept: RADIOLOGY | Facility: HOSPITAL | Age: 54
Discharge: HOME | End: 2019-05-20
Attending: OBSTETRICS & GYNECOLOGY
Payer: MEDICAID

## 2019-05-20 DIAGNOSIS — C77.9 METASTATIC SQUAMOUS CELL CARCINOMA TO LYMPH NODE: ICD-10-CM

## 2019-05-20 PROCEDURE — 25500020 PHARM REV CODE 255: Performed by: OBSTETRICS & GYNECOLOGY

## 2019-05-20 PROCEDURE — 74177 CT CHEST ABDOMEN PELVIS WITH CONTRAST (XPD): ICD-10-PCS | Mod: 26,,, | Performed by: RADIOLOGY

## 2019-05-20 PROCEDURE — 74177 CT ABD & PELVIS W/CONTRAST: CPT | Mod: 26,,, | Performed by: RADIOLOGY

## 2019-05-20 PROCEDURE — 74177 CT ABD & PELVIS W/CONTRAST: CPT | Mod: TC

## 2019-05-20 PROCEDURE — 71260 CT THORAX DX C+: CPT | Mod: 26,,, | Performed by: RADIOLOGY

## 2019-05-20 PROCEDURE — 71260 CT CHEST ABDOMEN PELVIS WITH CONTRAST (XPD): ICD-10-PCS | Mod: 26,,, | Performed by: RADIOLOGY

## 2019-05-20 RX ADMIN — IOHEXOL 100 ML: 350 INJECTION, SOLUTION INTRAVENOUS at 04:05

## 2019-05-21 NOTE — PROGRESS NOTES
Subjective:       Patient ID: Edita Riley is a 54 y.o. female.    Chief Complaint: Metastatic squamous cell carcinoma to lymph node (3mth f/u)    HPI     Patient comes in for follow up for recurrent SCCA of the cervix.     Completed concurrent chemoradiation in  2019.      May 2019: CT CAP: no evidence for disease. (CT done for insurance reason).  Previous imaging was a PET scan         Denies vaginal bleeding.   No complaints.       Pap:  2018: Normal  MM2018: normal        Her oncologic history is:  Referring physician:  Dr. Sebas Reed  Reason for referral:  Incisional biopsy of retroperitoneal periaortic node that shows squamous cell carcinoma consistent with gynecologic primary        Aug 2018: chronic abdominal pain referred for consideration of biopsy of a 2 cm right common iliac artery bifurcation node  Node was evident on scan in  and again in  - no change  Patient's symptoms are non specific, diffuse, have not resulted in weight loss  Patient already carries a diagnosis of diffuse pain syndromes including fibromyalgia        Aug 2018: PET scan Right common iliac lymph node suspicious for malignancy.  This could be benign or malignant lymphoproliferative disorder metastatic disease.  This is an a risky position for percutaneous biopsy.      2018: Underwent retroperitoneal approach for incisional biopsy of right common iliac LN. Pathology showed:  Supplemental Diagnosis  METASTATIC HIGH-GRADE CARCINOMA GROWING WITH SQUAMOUS CELL FEATURES. THE RESULTS OF  ADDITIONAL IMMUNOSTAINS ARE AS FOLLOWS: CK5/6, P63 AND CK7 ARE POSITIVE. TTF, S100 AND  CK20 ARE ALL NEGATIVE. THE CONTROLS STAIN APPROPRIATELY.  NOTE: GIVEN THESE RESULTS THIS MAY REPRESENT A PRIMARY IN THE URINARY OR LOWER GYN  TRACTS. DR. JAMESON HAS ALSO REVIEWED THIS CASE AND CONCURS WITH THE DIAGNOSIS.           She has a history of hysterectomy for cervical cancer in  in [place], [place].   "was for abnormal pap. Incidental finding of cervical cancer on the hyst specimen. She does not know any other details.      Nov 2018: started concurrent chemoradiation.      Dec 2018: admitted with hypokalemia, hypomagnesemia and hypocalcemia. Seizure like activity. Worsening depression. Electrolyte replacement. Started on Abilify by psychiatry. S/p 4 cycles of cisplatin.      Jan 2019: completed "WPRT and 4 cycles of cisplatin. Received only 4 due to electrolyte abnormalities.      Feb 2019:PET:   Right common iliac lymph node, shows max SUV of 5.6 on today's exam versus 10.2 on prior exam.  A subcentimeter lymph node is seen in CT in this region.    The previously seen avid left axillary lymph node is no longer visualized is FDG avid.              Review of Systems   Constitutional: Negative for chills, fatigue and fever.   Respiratory: Negative for cough, shortness of breath and wheezing.    Cardiovascular: Negative for chest pain, palpitations and leg swelling.   Gastrointestinal: Negative for abdominal pain, constipation, diarrhea, nausea and vomiting.   Genitourinary: Negative for difficulty urinating, dysuria, frequency, genital sores, hematuria, urgency, vaginal bleeding, vaginal discharge and vaginal pain.   Musculoskeletal: Negative for gait problem.   Neurological: Negative for weakness and numbness.   Hematological: Negative for adenopathy. Does not bruise/bleed easily.   Psychiatric/Behavioral: The patient is not nervous/anxious.        Objective:   /73   Pulse 95   Wt 103.9 kg (229 lb)   LMP 06/08/2014 (Approximate)   BMI 33.82 kg/m²      Physical Exam   Constitutional: She is oriented to person, place, and time. She appears well-developed and well-nourished.   HENT:   Head: Normocephalic and atraumatic.   Eyes: No scleral icterus.   Neck: Neck supple. No tracheal deviation present. No thyroid mass and no thyromegaly present.   Cardiovascular: Normal rate and regular rhythm. "   Pulmonary/Chest: Effort normal and breath sounds normal. She has no wheezes.   Abdominal: Soft. She exhibits no distension and no mass. There is no hepatosplenomegaly. There is no tenderness. There is no rebound and no guarding.   Genitourinary:   Genitourinary Comments: Bimanual exam:  Vulva: no lesions. Normal appearance  Urethra: Normal size and location. No lesions  Bladder: No masses or tenderness.  Vagina: normal mucosa. No lesion  Cervix: absent.   Uterus: absent.  Adnexa: no masses.  Rectovaginal: Not performed     Musculoskeletal: She exhibits no edema or tenderness.   Lymphadenopathy:     She has no cervical adenopathy.     She has no axillary adenopathy.        Right: No inguinal and no supraclavicular adenopathy present.        Left: No inguinal and no supraclavicular adenopathy present.   Neurological: She is alert and oriented to person, place, and time.   Skin: Skin is warm and dry.   Psychiatric: She has a normal mood and affect. Her behavior is normal. Judgment and thought content normal.       Assessment:       1. 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 History of cervical cancer    3. Screening mammogram, encounter for        Plan:   Metastatic squamous cell carcinoma to lymph node   STEVEN    RTC in 3 months. Will need WWE.  Repeat imaging in 6 months.   Schedule mmg  History of cervical cancer

## 2019-05-22 ENCOUNTER — TELEPHONE (OUTPATIENT)
Dept: GYNECOLOGIC ONCOLOGY | Facility: CLINIC | Age: 54
End: 2019-05-22

## 2019-05-23 ENCOUNTER — OFFICE VISIT (OUTPATIENT)
Dept: GYNECOLOGIC ONCOLOGY | Facility: CLINIC | Age: 54
End: 2019-05-23
Payer: MEDICAID

## 2019-05-23 VITALS
DIASTOLIC BLOOD PRESSURE: 73 MMHG | HEART RATE: 95 BPM | BODY MASS INDEX: 33.82 KG/M2 | SYSTOLIC BLOOD PRESSURE: 123 MMHG | WEIGHT: 229 LBS

## 2019-05-23 DIAGNOSIS — C77.9 METASTATIC SQUAMOUS CELL CARCINOMA TO LYMPH NODE: Primary | ICD-10-CM

## 2019-05-23 DIAGNOSIS — Z85.41 HISTORY OF CERVICAL CANCER: ICD-10-CM

## 2019-05-23 DIAGNOSIS — Z12.31 SCREENING MAMMOGRAM, ENCOUNTER FOR: ICD-10-CM

## 2019-05-23 PROCEDURE — 99214 OFFICE O/P EST MOD 30 MIN: CPT | Mod: S$PBB,,, | Performed by: OBSTETRICS & GYNECOLOGY

## 2019-05-23 PROCEDURE — 99999 PR PBB SHADOW E&M-EST. PATIENT-LVL III: CPT | Mod: PBBFAC,,, | Performed by: OBSTETRICS & GYNECOLOGY

## 2019-05-23 PROCEDURE — 99999 PR PBB SHADOW E&M-EST. PATIENT-LVL III: ICD-10-PCS | Mod: PBBFAC,,, | Performed by: OBSTETRICS & GYNECOLOGY

## 2019-05-23 PROCEDURE — 99214 PR OFFICE/OUTPT VISIT, EST, LEVL IV, 30-39 MIN: ICD-10-PCS | Mod: S$PBB,,, | Performed by: OBSTETRICS & GYNECOLOGY

## 2019-05-23 PROCEDURE — 99213 OFFICE O/P EST LOW 20 MIN: CPT | Mod: PBBFAC | Performed by: OBSTETRICS & GYNECOLOGY

## 2019-05-29 ENCOUNTER — HOSPITAL ENCOUNTER (EMERGENCY)
Facility: HOSPITAL | Age: 54
Discharge: HOME | End: 2019-05-29
Attending: EMERGENCY MEDICINE
Payer: MEDICAID

## 2019-05-29 VITALS
WEIGHT: 232 LBS | HEIGHT: 69 IN | BODY MASS INDEX: 34.36 KG/M2 | RESPIRATION RATE: 14 BRPM | HEART RATE: 84 BPM | OXYGEN SATURATION: 95 % | TEMPERATURE: 98 F | SYSTOLIC BLOOD PRESSURE: 135 MMHG | DIASTOLIC BLOOD PRESSURE: 71 MMHG

## 2019-05-29 DIAGNOSIS — K21.9 GASTROESOPHAGEAL REFLUX DISEASE, ESOPHAGITIS PRESENCE NOT SPECIFIED: Primary | ICD-10-CM

## 2019-05-29 DIAGNOSIS — R07.9 CHEST PAIN: ICD-10-CM

## 2019-05-29 LAB
ALBUMIN SERPL BCP-MCNC: 3.4 G/DL (ref 3.5–5.2)
ALP SERPL-CCNC: 106 U/L (ref 55–135)
ALT SERPL W/O P-5'-P-CCNC: 15 U/L (ref 10–44)
ANION GAP SERPL CALC-SCNC: 12 MMOL/L (ref 8–16)
AST SERPL-CCNC: 14 U/L (ref 10–40)
BASOPHILS # BLD AUTO: 0.02 K/UL (ref 0–0.2)
BASOPHILS NFR BLD: 0.4 % (ref 0–1.9)
BILIRUB SERPL-MCNC: 0.3 MG/DL (ref 0.1–1)
BUN SERPL-MCNC: 16 MG/DL (ref 6–20)
CALCIUM SERPL-MCNC: 9.4 MG/DL (ref 8.7–10.5)
CHLORIDE SERPL-SCNC: 103 MMOL/L (ref 95–110)
CO2 SERPL-SCNC: 24 MMOL/L (ref 23–29)
CREAT SERPL-MCNC: 1.2 MG/DL (ref 0.5–1.4)
DIFFERENTIAL METHOD: ABNORMAL
EOSINOPHIL # BLD AUTO: 0.1 K/UL (ref 0–0.5)
EOSINOPHIL NFR BLD: 2.1 % (ref 0–8)
ERYTHROCYTE [DISTWIDTH] IN BLOOD BY AUTOMATED COUNT: 13.2 % (ref 11.5–14.5)
EST. GFR  (AFRICAN AMERICAN): 58.4 ML/MIN/1.73 M^2
EST. GFR  (NON AFRICAN AMERICAN): 50.6 ML/MIN/1.73 M^2
GLUCOSE SERPL-MCNC: 164 MG/DL (ref 70–110)
HCT VFR BLD AUTO: 36.9 % (ref 37–48.5)
HGB BLD-MCNC: 11.8 G/DL (ref 12–16)
IMM GRANULOCYTES # BLD AUTO: 0.02 K/UL (ref 0–0.04)
IMM GRANULOCYTES NFR BLD AUTO: 0.4 % (ref 0–0.5)
LIPASE SERPL-CCNC: 19 U/L (ref 4–60)
LYMPHOCYTES # BLD AUTO: 1 K/UL (ref 1–4.8)
LYMPHOCYTES NFR BLD: 19 % (ref 18–48)
MCH RBC QN AUTO: 29.1 PG (ref 27–31)
MCHC RBC AUTO-ENTMCNC: 32 G/DL (ref 32–36)
MCV RBC AUTO: 91 FL (ref 82–98)
MONOCYTES # BLD AUTO: 0.4 K/UL (ref 0.3–1)
MONOCYTES NFR BLD: 7.9 % (ref 4–15)
NEUTROPHILS # BLD AUTO: 3.7 K/UL (ref 1.8–7.7)
NEUTROPHILS NFR BLD: 70.2 % (ref 38–73)
NRBC BLD-RTO: 0 /100 WBC
PLATELET # BLD AUTO: 209 K/UL (ref 150–350)
PMV BLD AUTO: 10.3 FL (ref 9.2–12.9)
POTASSIUM SERPL-SCNC: 3.9 MMOL/L (ref 3.5–5.1)
PROT SERPL-MCNC: 7.2 G/DL (ref 6–8.4)
RBC # BLD AUTO: 4.06 M/UL (ref 4–5.4)
SODIUM SERPL-SCNC: 139 MMOL/L (ref 136–145)
TROPONIN I SERPL DL<=0.01 NG/ML-MCNC: 0.01 NG/ML (ref 0–0.03)
TROPONIN I SERPL DL<=0.01 NG/ML-MCNC: <0.006 NG/ML (ref 0–0.03)
WBC # BLD AUTO: 5.31 K/UL (ref 3.9–12.7)

## 2019-05-29 PROCEDURE — 80053 COMPREHEN METABOLIC PANEL: CPT

## 2019-05-29 PROCEDURE — 93005 ELECTROCARDIOGRAM TRACING: CPT

## 2019-05-29 PROCEDURE — 99285 EMERGENCY DEPT VISIT HI MDM: CPT | Mod: 25

## 2019-05-29 PROCEDURE — 25000003 PHARM REV CODE 250: Performed by: PHYSICIAN ASSISTANT

## 2019-05-29 PROCEDURE — S0028 INJECTION, FAMOTIDINE, 20 MG: HCPCS | Performed by: PHYSICIAN ASSISTANT

## 2019-05-29 PROCEDURE — 83690 ASSAY OF LIPASE: CPT

## 2019-05-29 PROCEDURE — 85025 COMPLETE CBC W/AUTO DIFF WBC: CPT

## 2019-05-29 PROCEDURE — 84484 ASSAY OF TROPONIN QUANT: CPT

## 2019-05-29 PROCEDURE — 96374 THER/PROPH/DIAG INJ IV PUSH: CPT

## 2019-05-29 PROCEDURE — 96361 HYDRATE IV INFUSION ADD-ON: CPT

## 2019-05-29 PROCEDURE — 93010 ELECTROCARDIOGRAM REPORT: CPT | Mod: ,,, | Performed by: INTERNAL MEDICINE

## 2019-05-29 PROCEDURE — 93010 EKG 12-LEAD: ICD-10-PCS | Mod: ,,, | Performed by: INTERNAL MEDICINE

## 2019-05-29 PROCEDURE — 99285 EMERGENCY DEPT VISIT HI MDM: CPT | Mod: ,,, | Performed by: PHYSICIAN ASSISTANT

## 2019-05-29 PROCEDURE — 99285 PR EMERGENCY DEPT VISIT,LEVEL V: ICD-10-PCS | Mod: ,,, | Performed by: PHYSICIAN ASSISTANT

## 2019-05-29 RX ORDER — FAMOTIDINE 10 MG/ML
20 INJECTION INTRAVENOUS
Status: COMPLETED | OUTPATIENT
Start: 2019-05-29 | End: 2019-05-29

## 2019-05-29 RX ORDER — MAG HYDROX/ALUMINUM HYD/SIMETH 200-200-20
15 SUSPENSION, ORAL (FINAL DOSE FORM) ORAL
Status: COMPLETED | OUTPATIENT
Start: 2019-05-29 | End: 2019-05-29

## 2019-05-29 RX ORDER — ATROPINE SULFATE 0.1 MG/ML
0.4 INJECTION INTRAVENOUS ONCE
Status: DISCONTINUED | OUTPATIENT
Start: 2019-05-29 | End: 2019-05-29 | Stop reason: CLARIF

## 2019-05-29 RX ADMIN — ALUMINUM HYDROXIDE, MAGNESIUM HYDROXIDE, AND SIMETHICONE 15 ML: 200; 200; 20 SUSPENSION ORAL at 01:05

## 2019-05-29 RX ADMIN — FAMOTIDINE 20 MG: 10 INJECTION, SOLUTION INTRAVENOUS at 01:05

## 2019-05-29 RX ADMIN — SODIUM CHLORIDE 1000 ML: 0.9 INJECTION, SOLUTION INTRAVENOUS at 01:05

## 2019-05-29 NOTE — DISCHARGE INSTRUCTIONS
Take omeprazole at home.  Also be sure to take magnesium tablets.  Follow up primary provider next week.  Return to the ED immediately if he developed different chest pain worsening of symptoms, fever or cough.    Our goal in the emergency department is to always give you outstanding care and exceptional service. You may receive a survey by mail or e-mail in the next week regarding your experience in our ED. We would greatly appreciate your completing and returning the survey. Your feedback provides us with a way to recognize our staff who give very good care and it helps us learn how to improve when your experience was below our aspiration of excellence.

## 2019-05-29 NOTE — PROVIDER PROGRESS NOTES - EMERGENCY DEPT.
Encounter Date: 5/29/2019    ED Physician Progress Notes       SCRIBE NOTE: I, Lissette Tolbert, am scribing for, and in the presence of,  Dr. Treviño.  Physician Statement: I, Dr. Treviño, personally performed the services described in this documentation as scribed by Lissette Tolbert in my presence, and it is both accurate and complete.      EKG - STEMI Decision  Initial Reading: No STEMI present.

## 2019-05-29 NOTE — ED PROVIDER NOTES
"Encounter Date: 5/29/2019    SCRIBE #1 NOTE: I, Son Ariadne, am scribing for, and in the presence of,  Dr. Kumar . I have scribed the following portions of the note - the EKG reading.       History     Chief Complaint   Patient presents with    Back Pain     reports midsternal chest pain radiating to upper back that began today.  also reports some shortness of breath    Chest Pain     Ms Riley is a 54yoF presents for chest pain radiating to back; pertinent PMHx GERD and hiatal hernia, history of cervical cancer with metastasis to lymph nodes, anxiety, chronic back pain, HTN, history psychiatric care, history of hysterectomy. Patient endorses onset of sharp centralized substernal chest pain that radiates straight to back this morning while bending over to  something.  She tried eating some cereal afterwards though this worsened pain.  Pain is present on arrival.  This pain also made her short of breath and nauseated.  Pain does not improve with leaning forward.  She has seen cardiology for similar complaint last month was thought to be GI in etiology; she had a negative stress test last month.  She has started taking her omeprazole.  She also reports "fever at home up to 104F.  But I have had it for weeks I always get hot".  She denies chills, productive cough, other abdominal pain, vomiting, changes in vision, headache, syncope.    The patients available PMH, PSH, Social History, medications, allergies, and triage vital signs were reviewed just prior to their medical evaluation.  A ten point review of systems was completed and is negative except as documented above.  Patient denies any other acute medical complaint.          Review of patient's allergies indicates:   Allergen Reactions    Bee sting [allergen ext-venom-honey bee]      Rash      Grass pollen-bermuda, standard      rash     Past Medical History:   Diagnosis Date    Anxiety     Cervical cancer 2014    Chronic back pain     " Depression     Diarrhea due to malabsorption 11/14/2018    Fibromyalgia     GERD (gastroesophageal reflux disease)     Hiatal hernia 2014    History of cervical cancer 10/11/2018    Hx of psychiatric care     Cymbalta, trazodone    Hypertension     Hypomagnesemia 11/21/2018    Lactose intolerance     Metastatic squamous cell carcinoma to lymph node 10/11/2018    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 drug 11/14/2018    Osteoarthritis of back     Psychiatric problem     Stroke 1972     Past Surgical History:   Procedure Laterality Date    BILATERAL OOPHORECTOMY  2015    CHOLECYSTECTOMY  11/09/2016    Done at [facility], path showed chronic cholecystitis and gallstones    CHOLECYSTECTOMY-LAPAROSCOPIC N/A 11/9/2016    Performed by Joshua Goldberg[facility] at [facility] OR 2ND FLR    cold knife conization  2014    COLONOSCOPY  2014    COLONOSCOPY N/A 5/18/2018    Performed by Arden Gutiérrez MD at [facility] ENDO (4TH FLR)    COLONOSCOPY N/A 10/24/2016    Performed by Arden Gutiérrez MD at [facility] ENDO (4TH FLR)    ESOPHAGOGASTRODUODENOSCOPY  2014    ESOPHAGOGASTRODUODENOSCOPY (EGD) N/A 10/24/2016    Performed by Arden Gutiérrez MD at [facility] ENDO (4TH FLR)    HYSTERECTOMY  2014    [facility]    LYMPHADENECTOMY, RETROPERITONEUM Right 9/17/2018    Performed by Sebas Reed MD at [facility] OR 2ND FLR    OOPHORECTOMY       Family History   Problem Relation Age of Onset    Heart disease Sister     Heart disease Maternal Grandmother     Colon cancer Father     Esophageal cancer Father     Cancer Father         Lung-smoker    Cancer Mother         Cervical    Cervical cancer Mother     Breast cancer Maternal Aunt     Suicide Daughter         jumped from parking structure    Drug abuse Daughter     Drug abuse Daughter     Rectal cancer Neg Hx     Stomach cancer Neg Hx     Crohn's disease Neg Hx     Ulcerative colitis Neg Hx     Diabetes Neg Hx     Hypertension Neg Hx      Social History      Tobacco Use    Smoking status: Never Smoker    Smokeless tobacco: Never Used   Substance Use Topics    Alcohol use: No     Alcohol/week: 0.0 oz    Drug use: No     Review of Systems   Constitutional: Negative for chills and fatigue.   HENT: Negative for sore throat and trouble swallowing.         Sour taste in mouth   Respiratory: Negative for cough and shortness of breath (Since resolved).    Cardiovascular: Positive for chest pain. Negative for palpitations and leg swelling.   Gastrointestinal: Positive for abdominal pain and nausea. Negative for constipation, diarrhea and vomiting.   Genitourinary: Negative for dysuria, flank pain and pelvic pain.   Musculoskeletal: Positive for back pain ( radiates to back). Negative for gait problem, myalgias, neck pain and neck stiffness.   Skin: Negative for pallor and rash.   Neurological: Negative for dizziness, weakness, numbness and headaches.   Psychiatric/Behavioral: Negative for agitation and confusion.       Physical Exam     Initial Vitals [05/29/19 1230]   BP Pulse Resp Temp SpO2   (!) 155/75 88 18 97.5 °F (36.4 °C) 98 %      MAP       --         Physical Exam    Vitals reviewed.  Constitutional: She appears well-developed and well-nourished. She is not diaphoretic. No distress.   Nontoxic appearing female in NAD, VSS, afebrile, 96% on RA.     HENT:   Head: Normocephalic and atraumatic.   Right Ear: External ear normal.   Left Ear: External ear normal.   Nose: Nose normal.   Mouth/Throat: Oropharynx is clear and moist. No oropharyngeal exudate.   Eyes: Conjunctivae and EOM are normal. Pupils are equal, round, and reactive to light. No scleral icterus.   Neck: Normal range of motion. Neck supple.   Cardiovascular: Normal rate, regular rhythm and intact distal pulses.   Pulmonary/Chest: Breath sounds normal. She exhibits tenderness (Over lower sternum and epigastrium).   Abdominal: Soft. Bowel sounds are normal. She exhibits no distension. There is tenderness.  There is no rebound and no guarding.   Musculoskeletal: Normal range of motion. She exhibits no edema.   Neurological: She is alert and oriented to person, place, and time. She has normal strength. No cranial nerve deficit or sensory deficit.   Skin: Skin is warm and dry. Capillary refill takes less than 2 seconds. No rash noted. No erythema. No pallor.   Psychiatric: She has a normal mood and affect. Judgment and thought content normal.         ED Course   Procedures  Labs Reviewed   COMPREHENSIVE METABOLIC PANEL - Abnormal; Notable for the following components:       Result Value    Glucose 164 (*)     Albumin 3.4 (*)     eGFR if  58.4 (*)     eGFR if non  50.6 (*)     All other components within normal limits   CBC W/ AUTO DIFFERENTIAL - Abnormal; Notable for the following components:    Hemoglobin 11.8 (*)     Hematocrit 36.9 (*)     All other components within normal limits   TROPONIN I   LIPASE   TROPONIN I     EKG Readings: (Independently Interpreted)   Initial Reading: No STEMI. Rhythm: Normal Sinus Rhythm. Heart Rate: 94. Axis: Normal.   Normal intervals     ECG Results          EKG 12-lead (Chest Pain) Age >30 (Final result)  Result time 05/29/19 13:05:21    Final result by Interface, [facility] (05/29/19 13:05:21)                 Narrative:    Test Reason : R07.9,    Vent. Rate : 094 BPM     Atrial Rate : 094 BPM     P-R Int : 144 ms          QRS Dur : 078 ms      QT Int : 364 ms       P-R-T Axes : 035 050 040 degrees     QTc Int : 455 ms    Normal sinus rhythm  Possible Left atrial enlargement  Otherwise normal ECG    When compared with ECG of 17-APR-2019 10:36,  Possible  Left atrial enlargement  is new  Confirmed by PAULINA RAMIREZ MD ([id number]) on 5/29/2019 1:05:09 PM    Referred By:             Confirmed By:PAULINA RAMIREZ MD                            Imaging Results          X-Ray Chest PA And Lateral (Final result)  Result time 05/29/19 13:44:52    Final result by Gokul CUADRA  MD Antony (05/29/19 13:44:52)                 Impression:      See above      Electronically signed by: Gokul Hardy MD  Date:    05/29/2019  Time:    13:44             Narrative:    EXAMINATION:  XR CHEST PA AND LATERAL    CLINICAL HISTORY:  Chest pain, unspecified    TECHNIQUE:  PA and lateral views of the chest were performed.    COMPARISON:  None    FINDINGS:  Heart size normal.  The lungs are clear.  No pleural effusion                                 Medical Decision Making:   History:   Old Medical Records: I decided to obtain old medical records.  Old Records Summarized: records from clinic visits.       <> Summary of Records:     Initial Assessment:   Patient presents with chest pain radiating to back, has seen cardiology for this last month.  No infectious symptoms, VSS, afebrile, equal pulses and BP in both arms.  Differential Diagnosis:   DDx includes GERD, pancreatitis, or aortic dissection, PNA. Physical exam and history taking lower clinical suspicion for PE, pericarditis, viral myositis, acute abdomen, necrotizing pancreatitis.  Independently Interpreted Test(s):   I have ordered and independently interpreted X-rays - see prior notes.  I have ordered and independently interpreted EKG Reading(s) - see prior notes  Clinical Tests:   Lab Tests: Ordered and Reviewed  Radiological Study: Ordered and Reviewed  Medical Tests: Ordered and Reviewed  ED Management:  EKG shows no acute ischemic changes, normal axis, regular intervals, possible left atrial enlargement.  Labs are largely unremarkable, hemoglobin at baseline.  Troponin unremarkable though will trend.  Lipase unremarkable. Chest x-ray is without acute findings.  Given Maalox, IV Pepcid and IV fluids for symptoms.  Update:  Patient reports improvement in symptoms with aforementioned treatments.  Repeat troponin unremarkable. I do not suspect emergent etiology in symptoms to to workup.  I suspect this is secondary to GERD and recommend patient have  close follow-up PCP.  Encouraged to continue Prilosec Therapy and start taking Mag-ox tablets as prescribed. Patient agreed to plan of care and voiced understanding. Discharged in stable condition with strict ED return precautions.    Danita Duran PA-C  05/29/2019    I discussed the following case, diagnosis and plan of care with attending physician.      Additional MDM:     Well's Criteria Score:  -Clinical symptoms of DVT (leg swelling, pain with palpation) = 0.0  -Other diagnosis less likely than pulmonary embolism =            0.0  -Heart Rate >100 =   0.0  -Immobilization (= or > than 3 days) or surgery in the previous 4 weeks = 0.0  -Previous DVT/PE = 0.0  -Hemoptysis =          0.0  -Malignancy =           1.0  Well's Probability Score =    1               Scribe Attestation:   Scribe #1: I performed the above scribed service and the documentation accurately describes the services I performed. I attest to the accuracy of the note.               Clinical Impression:       ICD-10-CM ICD-9-CM   1. Gastroesophageal reflux disease, esophagitis presence not specified K21.9 530.81   2. Chest pain R07.9 786.50         Disposition:   Disposition: Discharged  Condition: Stable                        Danita Duran PA-C  05/29/19 1055

## 2019-06-11 NOTE — TELEPHONE ENCOUNTER
This medication was discontinued by her Gynecology team in July 2017, she was prescribed topical estrogen cream instead.   
t advised  
negative...

## 2019-06-18 NOTE — PROGRESS NOTES
PROCEDURE NOTE:         REFERRING PHYSICIAN: Sebas Reed MD      DIAGNOSIS: History of cervical cancer now with pelvic lymph node recurrence     Ms. Riley is a 53-year-old female with history fibromyalgia, stroke at the age of 7, and cervical cancer status post hysterectomy in 2015 in [place], [place] who was recently found to have a pelvic lymph node recurrence after evaluation for generalized malaise and abdominal pain. Workup with CT of the abdomen and pelvis on July 24, 2018 revealed an enlarged lymph node at the bifurcation of the right common iliac measuring 2 cm. There was also mild enlargement of the liver with hypoattenuation suggestive of steatosis. A PET scan on August 31, 2018 revealed hypermetabolic activity associated with the right iliac lymph node with SUV max of 10. There are low-grade axillary and inguinal lymph nodes noted with SUV max less than 2. There is no signs of metastatic disease. On is September 18, 2018, she underwent incisional biopsy of this lesion with pathology revealing metastatic high-grade carcinoma with squamous cell features most likely GYN origin. A Pap smear on October 12, 2018 is negative for malignancy. She also underwent evaluation by Urology which was negative for any urological origin.      She is currently receiving external beam radiation to the pelvis with a boost to the pelvic lymph node.  She is also recommended to undergo vaginal cuff brachytherapy to reduce her chance of recurrence. She is here today for her 2nd treatment.      DATE OF PROCEDURE: 1/3/2018      PROCEDURE: Intracavitary vaginal HDR #2      AREA TREATED: Vaginal cuff and upper 1/2 of the vagina      TREATMENT METHOD: Insertion of 2.5 cm vaginal cylinder into vagina      RADIATION: Iridium 192, high-dose-rate      DEPTH OF CALCULATION:  vaginal (cylinder) surface      DOSE: 5.0 gray      Time Out:   Performed by Carmen Carrillo RN  Identifiers used: Name and date of birth      She was brought to the  HDR delivery room and a 2.5 cm vaginal cylinder was placed into the vagina. The brachytherapy catheter was connected to the cylinder and the treatment was delivered. She received the 2nd out of 3 fractions of HDR vaginal brachytherapy as above. She tolerated the treatment without any unexpected side effects. She will return in 1 week to continue her treatment. I was present during the entire procedure.  She will continue daily external beam treatments as planned.

## 2019-07-10 ENCOUNTER — TELEPHONE (OUTPATIENT)
Dept: INTERNAL MEDICINE | Facility: CLINIC | Age: 54
End: 2019-07-10

## 2019-07-10 NOTE — TELEPHONE ENCOUNTER
----- Message from Rossana Link sent at 7/10/2019  1:02 PM CDT -----  Contact: [facility]/pharmacy #[id number] - [place], [place] - [address].[phone] (Phone)  Re: Audrey Mustafa    Patient is calling for an RX refill or new RX.  Is this a refill or new RX:  refill  RX name and strength: Kdur 20 meq  Directions (copy/paste from chart):    Is this a 30 day or 90 day RX:    Local pharmacy or mail order pharmacy:    Pharmacy name and phone # (copy/paste from chart):   [facility]/pharmacy #[id number] - [place], [place] - [address] [phone] (Phone)  [phone] (Fax)      Comments:

## 2019-08-05 ENCOUNTER — CLINICAL SUPPORT (OUTPATIENT)
Dept: SMOKING CESSATION | Facility: CLINIC | Age: 54
End: 2019-08-05
Payer: COMMERCIAL

## 2019-08-05 PROCEDURE — 99999 PR PBB SHADOW E&M-EST. PATIENT-LVL I: CPT | Mod: PBBFAC,,,

## 2019-08-05 PROCEDURE — 99999 PR PBB SHADOW E&M-EST. PATIENT-LVL I: ICD-10-PCS | Mod: PBBFAC,,,

## 2019-08-06 ENCOUNTER — OFFICE VISIT (OUTPATIENT)
Dept: RADIATION ONCOLOGY | Facility: CLINIC | Age: 54
End: 2019-08-06
Payer: MEDICAID

## 2019-08-06 VITALS
BODY MASS INDEX: 35.44 KG/M2 | SYSTOLIC BLOOD PRESSURE: 139 MMHG | RESPIRATION RATE: 16 BRPM | HEIGHT: 69 IN | WEIGHT: 239.31 LBS | DIASTOLIC BLOOD PRESSURE: 67 MMHG | HEART RATE: 94 BPM | TEMPERATURE: 98 F

## 2019-08-06 DIAGNOSIS — C77.9 METASTATIC SQUAMOUS CELL CARCINOMA TO LYMPH NODE: Primary | ICD-10-CM

## 2019-08-06 DIAGNOSIS — Z85.41 HISTORY OF CERVICAL CANCER: ICD-10-CM

## 2019-08-06 PROCEDURE — 99213 OFFICE O/P EST LOW 20 MIN: CPT | Mod: PBBFAC | Performed by: RADIOLOGY

## 2019-08-06 PROCEDURE — 99213 OFFICE O/P EST LOW 20 MIN: CPT | Mod: S$PBB,,, | Performed by: RADIOLOGY

## 2019-08-06 PROCEDURE — 99213 PR OFFICE/OUTPT VISIT, EST, LEVL III, 20-29 MIN: ICD-10-PCS | Mod: S$PBB,,, | Performed by: RADIOLOGY

## 2019-08-06 PROCEDURE — 99999 PR PBB SHADOW E&M-EST. PATIENT-LVL III: CPT | Mod: PBBFAC,,, | Performed by: RADIOLOGY

## 2019-08-06 PROCEDURE — 99999 PR PBB SHADOW E&M-EST. PATIENT-LVL III: ICD-10-PCS | Mod: PBBFAC,,, | Performed by: RADIOLOGY

## 2019-08-06 NOTE — PROGRESS NOTES
REFERRING PHYSICIAN: Sebas Reed MD     DIAGNOSIS: History of cervical cancer now with pelvic lymph node recurrence      Radiation Treatment Summary:  Ms. Riley completed radiation to the pelvis and periaortic lymph nodes concurrent with chemotherapy as shown below.     Treatment Site Energy Dose/Fx (Gy) #Fx Total Dose (Gy) Start Date End Date   PELVIS 6X 1.8 25 / 25 45 11/7/2018 12/17/2018   Right LN BOOST 6X 1.8 8 / 8 14.4 12/18/2018 1/2/2019   Vaginal cuff/ upper vagina HDR 5 3/3 15 12/19/2018 1/3/2019      INTERVAL HISTORY:   Ms. Riley is a 54-year-old female with history fibromyalgia, major depressive disorder, stroke at the age of 7, and cervical cancer status post hysterectomy in 2015 in [place], [place] who was recently found to have a pelvic lymph node recurrence after evaluation for generalized malaise and abdominal pain. Workup with CT of the abdomen and pelvis on July 24, 2018 revealed an enlarged lymph node at the bifurcation of the right common iliac measuring 2 cm. There was also mild enlargement of the liver with hypoattenuation suggestive of steatosis. A PET scan on August 31, 2018 revealed hypermetabolic activity associated with the right iliac lymph node with SUV max of 10. There are low-grade axillary and inguinal lymph nodes noted with SUV max less than 2. There is no signs of metastatic disease. On is September 18, 2018, she underwent incisional biopsy of this lesion with pathology revealing metastatic high-grade carcinoma with squamous cell features most likely GYN origin. A Pap smear on October 12, 2018 is negative for malignancy. She also underwent evaluation by Urology which was negative for any urological origin. She received definitive radiation to the pelvis followed by a boost to the right iliac region concurrent with chemotherapy as above.  She is here today for a routine follow-up.     Since completion of radiation, she underwent a PET scan on February 13, 2019 which  "reveals decrease in the uptake involving the right common iliac lymph node down to 5.6 compared to 10.2 previously.  The previously seen left axillary lymph node is no longer visualized. Since I saw her last, she underwent a repeat CT of the chest, abdomen, and pelvis on May 20, 2019 which revealed no abnormal pelvic masses to suggest recurrence or residual malignancy.  The previously demonstrated lymph node at the level of the right common iliac bifurcation is no longer seen.  No new enlarging lymph nodes are noted.  At present, she reports occasional loose stools which was present prior to her treatment.  She denies nausea, vomiting, vaginal bleeding, vaginal discharge, dysuria, hematuria, fever, night sweats, or weight loss.    PHYSICAL EXAMINATION:  VITAL SINGS: /67   Pulse 94   Temp 97.9 °F (36.6 °C) (Oral)   Resp 16   Ht 5' 9" (1.753 m)   Wt 108.5 kg (239 lb 4.8 oz)   LMP 06/08/2014 (Approximate)   BMI 35.34 kg/m²   GENERAL: Patient is alert and oriented, in no acute distress.  HEENT: Extraocular muscles are intact.  Oropharynx is clear without lesions.  There is no cervical or supraclavicular adenopathy palpated.    CHEST: Breath sounds clear bilaterally.  No rales.  No rhonchi.  Unlabored respirations.  CARDIOVASCULAR: Normal S1, S2.  Normal rate.  Regular rhythm.  ABDOMEN: Bowel sounds normal.  No tenderness.  No abdominal distention.  No hepatomegaly.  No splenomegaly.  PELVIC EXAM:  A speculum examination reveals intact vaginal cuff with no suspicious lesions.  EXTREMITIES: No clubbing, cyanosis, edema  NEUROLOGIC: Cranial nerves II through XII are grossly intact.  Sensation is intact.  Strength is 5 out of 5 in the upper and lower extremities bilaterally.    ASSESSMENT:   This is a 54-year-old female with history of cervical cancer in 2015 who underwent hysterectomy at an outside facility who was recently found to have pelvic lymphadenopathy with incisional biopsy on September 18, 2018 " revealing high-grade carcinoma with squamous cell features, for which she completed concurrent chemotherapy and pelvic and vaginal cuff irradiation.    PLAN:   Ms. Riley is doing well from the standpoint of cervical cancer.  Her previously noted pelvic lymphadenopathy has resolved after radiation.  She will repeat imaging on scheduled per Dr. Lemon in 3 months.  She also has a follow-up with Dr. Lemon in 2 weeks.  I plan to see her back in approximately 6 months, unless symptoms warrant otherwise.

## 2019-08-08 ENCOUNTER — TELEPHONE (OUTPATIENT)
Dept: NEUROLOGY | Facility: HOSPITAL | Age: 54
End: 2019-08-08

## 2019-08-08 NOTE — TELEPHONE ENCOUNTER
----- Message from Sanford Barajas sent at 8/8/2019  2:10 PM CDT -----  Contact: Patient @ [phone]  Patient calling to schedule a f/u appt. pls call

## 2019-08-09 ENCOUNTER — HOSPITAL ENCOUNTER (OUTPATIENT)
Dept: RADIOLOGY | Facility: HOSPITAL | Age: 54
Discharge: HOME | End: 2019-08-09
Attending: OBSTETRICS & GYNECOLOGY
Payer: MEDICAID

## 2019-08-09 DIAGNOSIS — Z12.31 SCREENING MAMMOGRAM, ENCOUNTER FOR: ICD-10-CM

## 2019-08-09 PROCEDURE — 77063 MAMMO DIGITAL SCREENING BILAT WITH TOMOSYNTHESIS_CAD: ICD-10-PCS | Mod: 26,,, | Performed by: RADIOLOGY

## 2019-08-09 PROCEDURE — 77063 BREAST TOMOSYNTHESIS BI: CPT | Mod: 26,,, | Performed by: RADIOLOGY

## 2019-08-09 PROCEDURE — 77067 SCR MAMMO BI INCL CAD: CPT | Mod: 26,,, | Performed by: RADIOLOGY

## 2019-08-09 PROCEDURE — 77067 SCR MAMMO BI INCL CAD: CPT | Mod: TC

## 2019-08-09 PROCEDURE — 77067 MAMMO DIGITAL SCREENING BILAT WITH TOMOSYNTHESIS_CAD: ICD-10-PCS | Mod: 26,,, | Performed by: RADIOLOGY

## 2019-08-22 ENCOUNTER — TELEPHONE (OUTPATIENT)
Dept: ADMINISTRATIVE | Facility: OTHER | Age: 54
End: 2019-08-22

## 2019-08-22 DIAGNOSIS — R42 VERTIGO: ICD-10-CM

## 2019-08-22 PROBLEM — E83.42 HYPOMAGNESEMIA: Status: RESOLVED | Noted: 2018-11-20 | Resolved: 2019-08-22

## 2019-08-22 NOTE — TELEPHONE ENCOUNTER
----- Message from Jayda Vinny sent at 8/22/2019 11:24 AM CDT -----  Contact: Self Call [phone]  Patient is calling for an RX refill or new RX.  Is this a refill or new RX:  Refill  RX name and strength: Meclizine (ANTIVERT) 25 mg tablet  Directions (copy/paste from chart):  Route: Take 1 tablet (25 mg total) by mouth 3 (three) times daily as needed for Dizziness. - Oral  Is this a 30 day or 90 day RX: 30   Local pharmacy or mail order pharmacy:  Local  Pharmacy name and phone # (copy/paste from chart): [facility]  [phone] (Phone)  [phone] (Fax)      Comments:  Also she would like a muscle relaxer called in  for he back spasms.

## 2019-08-22 NOTE — PROGRESS NOTES
Subjective:       Patient ID: Edita Riley is a 54 y.o. female.    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  Denies vaginal bleeding.   No complaints.       Pap:  2018: Normal  MM2019: normal        Her oncologic history is:  Referring physician:  Dr. Sebas Reed  Reason for referral:  Incisional biopsy of retroperitoneal periaortic node that shows squamous cell carcinoma consistent with gynecologic primary        Aug 2018: chronic abdominal pain referred for consideration of biopsy of a 2 cm right common iliac artery bifurcation node  Node was evident on scan in  and again in  - no change  Patient's symptoms are non specific, diffuse, have not resulted in weight loss  Patient already carries a diagnosis of diffuse pain syndromes including fibromyalgia        Aug 2018: PET scan Right common iliac lymph node suspicious for malignancy.  This could be benign or malignant lymphoproliferative disorder metastatic disease.  This is an a risky position for percutaneous biopsy.      2018: Underwent retroperitoneal approach for incisional biopsy of right common iliac LN. Pathology showed:  Supplemental Diagnosis  METASTATIC HIGH-GRADE CARCINOMA GROWING WITH SQUAMOUS CELL FEATURES. THE RESULTS OF  ADDITIONAL IMMUNOSTAINS ARE AS FOLLOWS: CK5/6, P63 AND CK7 ARE POSITIVE. TTF, S100 AND  CK20 ARE ALL NEGATIVE. THE CONTROLS STAIN APPROPRIATELY.  NOTE: GIVEN THESE RESULTS THIS MAY REPRESENT A PRIMARY IN THE URINARY OR LOWER GYN  TRACTS. DR. JAMESON HAS ALSO REVIEWED THIS CASE AND CONCURS WITH THE DIAGNOSIS.           She has a history of hysterectomy for cervical cancer in 2015 in [place], [place]. Hyst was for abnormal pap. Incidental finding of cervical cancer on the hyst specimen. She does not know any other details.      2018: started concurrent chemoradiation.      Dec  "2018: admitted with hypokalemia, hypomagnesemia and hypocalcemia. Seizure like activity. Worsening depression. Electrolyte replacement. Started on Abilify by psychiatry. S/p 4 cycles of cisplatin.      Jan 2019: completed "WPRT and 4 cycles of cisplatin. Received only 4 due to electrolyte abnormalities.      Feb 2019:PET:     Right common iliac lymph node, shows max SUV of 5.6 on today's exam versus 10.2 on prior exam.  A subcentimeter lymph node is seen in CT in this region.    The previously seen avid left axillary lymph node is no longer visualized is FDG avid.           May 2019: CT CAP: no evidence for disease. (CT done for insurance reason).  Previous imaging was a PET scan          Review of Systems   Constitutional: Negative for chills, fatigue and fever.   Eyes: Negative for visual disturbance.   Respiratory: Negative for cough, shortness of breath and wheezing.    Cardiovascular: Negative for chest pain, palpitations and leg swelling.   Gastrointestinal: Negative for abdominal distention, abdominal pain, constipation, diarrhea, nausea and vomiting.   Genitourinary: Negative for difficulty urinating, dysuria, frequency, genital sores, hematuria, pelvic pain, urgency, vaginal bleeding, vaginal discharge and vaginal pain.   Musculoskeletal: Negative for gait problem and neck stiffness.   Skin: Negative for rash.   Neurological: Negative for seizures and weakness.   Hematological: Negative for adenopathy. Does not bruise/bleed easily.   Psychiatric/Behavioral: The patient is not nervous/anxious.        Objective:   /63   Pulse 96   Ht 5' 9" (1.753 m)   Wt 105.7 kg (233 lb 0.4 oz)   LMP 06/08/2014 (Approximate)   BMI 34.41 kg/m²      Physical Exam   Constitutional: She is oriented to person, place, and time. She appears well-developed and well-nourished.   HENT:   Head: Normocephalic and atraumatic.   Eyes: No scleral icterus.   Neck: No tracheal deviation present. No thyroid mass and no thyromegaly " present.   Cardiovascular: Normal rate and regular rhythm.   Pulmonary/Chest: Effort normal and breath sounds normal. She has no wheezes. Right breast exhibits no mass, no nipple discharge, no skin change and no tenderness. Left breast exhibits no mass, no nipple discharge, no skin change and no tenderness.   Abdominal: She exhibits no distension and no mass. There is no hepatosplenomegaly. There is no tenderness. There is no rebound and no guarding.   Genitourinary:   Genitourinary Comments: Bimanual exam:  Vulva: no lesions. Normal appearance  Urethra: Normal size and location. No lesions  Bladder: No masses or tenderness.  Vagina: normal mucosa. No lesion  Cervix: absent.   Uterus: absent.  Adnexa: no masses.  Rectovaginal: No posterior cul de sac thickening or nodularity.  Rectal: no masses. Nontender. Normal tone.      Musculoskeletal: She exhibits no edema or tenderness.   Lymphadenopathy:     She has no cervical adenopathy.     She has no axillary adenopathy.        Right: No inguinal and no supraclavicular adenopathy present.        Left: No inguinal and no supraclavicular adenopathy present.   Neurological: She is alert and oriented to person, place, and time.   Skin: Skin is warm and dry. No rash noted.   Psychiatric: She has a normal mood and affect. Her behavior is normal. Judgment and thought content normal.       Assessment:       1. 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 History of cervical cancer    3. Screening for malignant neoplasm of cervix    4. Well woman exam with routine gynecological exam        Plan:   Metastatic squamous cell carcinoma to lymph node   STEVEN    RTC in 3 months.   -     Basic metabolic panel; Future; Expected date: 11/23/2019  -     CT Abdomen Pelvis With Contrast; Future; Expected date: 11/23/2019    History of cervical cancer  -     Liquid-based pap smear, screening    Screening for malignant neoplasm of cervix    Well woman exam with routine gynecological  exam

## 2019-08-23 ENCOUNTER — OFFICE VISIT (OUTPATIENT)
Dept: GYNECOLOGIC ONCOLOGY | Facility: CLINIC | Age: 54
End: 2019-08-23
Payer: MEDICAID

## 2019-08-23 VITALS
SYSTOLIC BLOOD PRESSURE: 123 MMHG | DIASTOLIC BLOOD PRESSURE: 63 MMHG | BODY MASS INDEX: 34.51 KG/M2 | HEIGHT: 69 IN | WEIGHT: 233 LBS | HEART RATE: 96 BPM

## 2019-08-23 DIAGNOSIS — Z01.419 WELL WOMAN EXAM WITH ROUTINE GYNECOLOGICAL EXAM: ICD-10-CM

## 2019-08-23 DIAGNOSIS — Z85.41 HISTORY OF CERVICAL CANCER: ICD-10-CM

## 2019-08-23 DIAGNOSIS — C77.9 METASTATIC SQUAMOUS CELL CARCINOMA TO LYMPH NODE: Primary | ICD-10-CM

## 2019-08-23 DIAGNOSIS — Z12.4 SCREENING FOR MALIGNANT NEOPLASM OF CERVIX: ICD-10-CM

## 2019-08-23 PROCEDURE — 99999 PR PBB SHADOW E&M-EST. PATIENT-LVL III: ICD-10-PCS | Mod: PBBFAC,,, | Performed by: OBSTETRICS & GYNECOLOGY

## 2019-08-23 PROCEDURE — 99396 PREV VISIT EST AGE 40-64: CPT | Mod: S$PBB,,, | Performed by: OBSTETRICS & GYNECOLOGY

## 2019-08-23 PROCEDURE — 88175 CYTOPATH C/V AUTO FLUID REDO: CPT

## 2019-08-23 PROCEDURE — 99396 PR PREVENTIVE VISIT,EST,40-64: ICD-10-PCS | Mod: S$PBB,,, | Performed by: OBSTETRICS & GYNECOLOGY

## 2019-08-23 PROCEDURE — 99213 OFFICE O/P EST LOW 20 MIN: CPT | Mod: PBBFAC | Performed by: OBSTETRICS & GYNECOLOGY

## 2019-08-23 PROCEDURE — 99999 PR PBB SHADOW E&M-EST. PATIENT-LVL III: CPT | Mod: PBBFAC,,, | Performed by: OBSTETRICS & GYNECOLOGY

## 2019-08-23 RX ORDER — POTASSIUM CHLORIDE 20 MEQ/1
20 TABLET, EXTENDED RELEASE ORAL DAILY
Refills: 1 | COMMUNITY
Start: 2019-06-10 | End: 2020-05-04

## 2019-08-23 RX ORDER — MECLIZINE HYDROCHLORIDE 25 MG/1
25 TABLET ORAL 3 TIMES DAILY PRN
Qty: 30 TABLET | Refills: 6 | Status: SHIPPED | OUTPATIENT
Start: 2019-08-23 | End: 2019-09-19 | Stop reason: SDUPTHER

## 2019-08-30 ENCOUNTER — TELEPHONE (OUTPATIENT)
Dept: GYNECOLOGIC ONCOLOGY | Facility: CLINIC | Age: 54
End: 2019-08-30

## 2019-08-30 NOTE — TELEPHONE ENCOUNTER
----- Message from Refugio Lemon MD sent at 8/30/2019 12:42 PM CDT -----  Please send normal pap letter. Repeat in 1 year. Thank you.

## 2019-09-03 ENCOUNTER — OFFICE VISIT (OUTPATIENT)
Dept: NEUROLOGY | Facility: CLINIC | Age: 54
End: 2019-09-03
Payer: MEDICAID

## 2019-09-03 VITALS
BODY MASS INDEX: 33.92 KG/M2 | SYSTOLIC BLOOD PRESSURE: 109 MMHG | HEIGHT: 69 IN | DIASTOLIC BLOOD PRESSURE: 73 MMHG | HEART RATE: 97 BPM | WEIGHT: 229 LBS

## 2019-09-03 DIAGNOSIS — G51.39 HEMIFACIAL SPASM: ICD-10-CM

## 2019-09-03 DIAGNOSIS — G51.0 FACIAL PALSY: ICD-10-CM

## 2019-09-03 DIAGNOSIS — M79.7 FIBROMYALGIA: Primary | ICD-10-CM

## 2019-09-03 DIAGNOSIS — G56.01 CARPAL TUNNEL SYNDROME ON RIGHT: ICD-10-CM

## 2019-09-03 PROCEDURE — 99214 OFFICE O/P EST MOD 30 MIN: CPT | Mod: PBBFAC | Performed by: PSYCHIATRY & NEUROLOGY

## 2019-09-03 PROCEDURE — 99999 PR PBB SHADOW E&M-EST. PATIENT-LVL IV: CPT | Mod: PBBFAC,,, | Performed by: PSYCHIATRY & NEUROLOGY

## 2019-09-03 PROCEDURE — 99214 PR OFFICE/OUTPT VISIT, EST, LEVL IV, 30-39 MIN: ICD-10-PCS | Mod: S$PBB,,, | Performed by: PSYCHIATRY & NEUROLOGY

## 2019-09-03 PROCEDURE — 99214 OFFICE O/P EST MOD 30 MIN: CPT | Mod: S$PBB,,, | Performed by: PSYCHIATRY & NEUROLOGY

## 2019-09-03 PROCEDURE — 99999 PR PBB SHADOW E&M-EST. PATIENT-LVL IV: ICD-10-PCS | Mod: PBBFAC,,, | Performed by: PSYCHIATRY & NEUROLOGY

## 2019-09-03 RX ORDER — GABAPENTIN 300 MG/1
300 CAPSULE ORAL 3 TIMES DAILY
Qty: 270 CAPSULE | Refills: 3 | Status: ON HOLD | OUTPATIENT
Start: 2019-09-03 | End: 2020-12-03 | Stop reason: HOSPADM

## 2019-09-03 NOTE — PROGRESS NOTES
Neurology Clinic Follow-Up Note    Impression:  1. Musculoskeletal pain, R>L: suspect FM  2. L LMN CN VII palsy since age 7 with hemifacial spasm: suspect remote Bell's  3. R CTS, responsive to splinting   4. Depression  5. HTN: controlled    Plan:  1. Continue Cymbalta 60mg/d  2. Increase gabapentin to 300mg tid  3. Refer to Dr. Ramirez PM&R  4. She may benefit from physical therapy but I will defer to Dr. Ramirez  5. Continue R wrist splint  6. F/U with Dr. Ramirez for above; return to me prn      Problem List Items Addressed This Visit        1 - High    Hemifacial spasm    Facial palsy    Carpal tunnel syndrome on right       2     Fibromyalgia - Primary    Relevant Orders    Ambulatory Referral to Physical Medicine Rehab          Patient returns for follow-up of above and R sided pain.  Restarting Cymbalta helped with pain but she developed worse R sided pain that started a couple months ago.  Pain is constant and affects arm, R chest/abdomen and leg.  Hard to identify triggers or alleviating factors.  She thinks there may be weakness.  R wrist splint helps significantly with R hand pain/numbness  Face: stable  Depression: stable  BP: compliant with meds    Outpatient Medications Marked as Taking for the 9/3/19 encounter (Office Visit) with Refugio Benjamin MD   Medication Sig Dispense Refill    ARIPiprazole (ABILIFY) 2 MG Tab Take 1 tablet (2 mg total) by mouth once daily. 30 tablet 11    cholecalciferol, vitamin D3, 2,000 unit Cap Take 1 capsule by mouth once daily.      cyclobenzaprine (FLEXERIL) 10 MG tablet TAKE 1 TABLET (10 MG TOTAL) BY MOUTH NIGHTLY AS NEEDED FOR MUSCLE SPASMS.  1    DULoxetine (CYMBALTA) 60 MG capsule Take 1 capsule (60 mg total) by mouth once daily. 90 capsule 3    gabapentin (NEURONTIN) 300 MG capsule Take 1 capsule (300 mg total) by mouth 3 (three) times daily. 270 capsule 3    lisinopril 10 MG tablet Take 1 tablet (10 mg total) by mouth once daily. 90 tablet 3     "magnesium oxide (MAG-OX) 400 mg (241.3 mg magnesium) tablet Take 1 tablet (400 mg total) by mouth 3 (three) times daily. 90 tablet 1    meclizine (ANTIVERT) 25 mg tablet Take 1 tablet (25 mg total) by mouth 3 (three) times daily as needed for Dizziness. 30 tablet 6    omeprazole (PRILOSEC) 40 MG capsule Take 1 capsule (40 mg total) by mouth once daily. 90 capsule 3    oxyCODONE-acetaminophen (PERCOCET) 5-325 mg per tablet Take 1 tablet by mouth every 4 (four) hours as needed. 28 tablet 0    potassium chloride SA (K-DUR,KLOR-CON) 20 MEQ tablet Take 20 mEq by mouth once daily.  1    traMADol (ULTRAM) 50 mg tablet Take 50 mg by mouth every 6 (six) hours as needed for Pain.      traZODone (DESYREL) 50 MG tablet Take 1 tablet (50 mg total) by mouth every evening. 90 tablet 3    [DISCONTINUED] gabapentin (NEURONTIN) 300 MG capsule TAKE 1 CAPSULE (300 MG TOTAL) BY MOUTH 2 (TWO) TIMES DAILY. 180 capsule 3       /73   Pulse 97   Ht 5' 9" (1.753 m)   Wt 103.9 kg (229 lb)   LMP 06/08/2014 (Approximate)   BMI 33.82 kg/m²   Overweight.  Awake, alert and oriented.  Language is normal. EOMF without nystagmus, VFF, L LMN facial weakness with L HFS. No UE drift.  No extinction to double simultaneous tactile stimulation.  Muscle power is normal including R APB.  Sensation intact. + diffuse muscle tenderness. DTRs 2+ UEs; 2++ KJs, 2+ AJs, no clonus. Gait is steady.    No results found for: SEDRATE  No results found for: CKTOTAL    Refugio Benjamin MD  "

## 2019-09-05 ENCOUNTER — TELEPHONE (OUTPATIENT)
Dept: NEUROLOGY | Facility: CLINIC | Age: 54
End: 2019-09-05

## 2019-09-05 NOTE — TELEPHONE ENCOUNTER
----- Message from Abigail Awan sent at 9/5/2019  4:02 PM CDT -----  Contact: pt @ [phone]  Calling to speak with someone in Dr. Benjamin's office regarding the referral to another doctor that Dr. Benjamin mentioned during her visit.please call.

## 2019-09-19 ENCOUNTER — IMMUNIZATION (OUTPATIENT)
Dept: INTERNAL MEDICINE | Facility: CLINIC | Age: 54
End: 2019-09-19
Payer: MEDICAID

## 2019-09-19 ENCOUNTER — OFFICE VISIT (OUTPATIENT)
Dept: INTERNAL MEDICINE | Facility: CLINIC | Age: 54
End: 2019-09-19
Payer: MEDICAID

## 2019-09-19 VITALS
DIASTOLIC BLOOD PRESSURE: 78 MMHG | SYSTOLIC BLOOD PRESSURE: 110 MMHG | HEIGHT: 69 IN | HEART RATE: 88 BPM | WEIGHT: 233.69 LBS | OXYGEN SATURATION: 95 % | BODY MASS INDEX: 34.61 KG/M2

## 2019-09-19 DIAGNOSIS — K21.00 GASTROESOPHAGEAL REFLUX DISEASE WITH ESOPHAGITIS: ICD-10-CM

## 2019-09-19 DIAGNOSIS — R42 VERTIGO: ICD-10-CM

## 2019-09-19 DIAGNOSIS — I10 HYPERTENSION, UNSPECIFIED TYPE: ICD-10-CM

## 2019-09-19 DIAGNOSIS — Z13.1 SCREENING FOR DIABETES MELLITUS: Primary | ICD-10-CM

## 2019-09-19 PROCEDURE — 99999 PR PBB SHADOW E&M-EST. PATIENT-LVL IV: CPT | Mod: PBBFAC,,, | Performed by: STUDENT IN AN ORGANIZED HEALTH CARE EDUCATION/TRAINING PROGRAM

## 2019-09-19 PROCEDURE — 90471 IMMUNIZATION ADMIN: CPT | Mod: PBBFAC

## 2019-09-19 PROCEDURE — 99213 OFFICE O/P EST LOW 20 MIN: CPT | Mod: S$PBB,,, | Performed by: STUDENT IN AN ORGANIZED HEALTH CARE EDUCATION/TRAINING PROGRAM

## 2019-09-19 PROCEDURE — 99213 PR OFFICE/OUTPT VISIT, EST, LEVL III, 20-29 MIN: ICD-10-PCS | Mod: S$PBB,,, | Performed by: STUDENT IN AN ORGANIZED HEALTH CARE EDUCATION/TRAINING PROGRAM

## 2019-09-19 PROCEDURE — 99214 OFFICE O/P EST MOD 30 MIN: CPT | Mod: PBBFAC,25 | Performed by: STUDENT IN AN ORGANIZED HEALTH CARE EDUCATION/TRAINING PROGRAM

## 2019-09-19 PROCEDURE — 99999 PR PBB SHADOW E&M-EST. PATIENT-LVL IV: ICD-10-PCS | Mod: PBBFAC,,, | Performed by: STUDENT IN AN ORGANIZED HEALTH CARE EDUCATION/TRAINING PROGRAM

## 2019-09-19 RX ORDER — LISINOPRIL 10 MG/1
10 TABLET ORAL DAILY
Qty: 90 TABLET | Refills: 3 | Status: ON HOLD | OUTPATIENT
Start: 2019-09-19 | End: 2020-08-15 | Stop reason: HOSPADM

## 2019-09-19 RX ORDER — OMEPRAZOLE 40 MG/1
40 CAPSULE, DELAYED RELEASE ORAL DAILY
Qty: 90 CAPSULE | Refills: 3 | Status: SHIPPED | OUTPATIENT
Start: 2019-09-19 | End: 2020-02-06 | Stop reason: SDUPTHER

## 2019-09-19 RX ORDER — MECLIZINE HYDROCHLORIDE 25 MG/1
25 TABLET ORAL 3 TIMES DAILY PRN
Qty: 30 TABLET | Refills: 6 | Status: SHIPPED | OUTPATIENT
Start: 2019-09-19 | End: 2020-05-04

## 2019-09-19 NOTE — PROGRESS NOTES
Clinic Note  9/19/2019      Subjective:       Patient ID:  Edita is a 54 y.o. female being seen for an established visit.    Chief Complaint: annual appointment    HPI    Edita Riley is a 54 y.o. female with history of metastatic squamous cell carcinoma (followed by Dr. Lemon with [facility]), major depression, HTN, fibromyalgia, CVA at age 7, and chronic back pain who presents for an annual exam. She requests refills today.    History of recurrent cervical cancer: she follows with Dr. Lemon (gyn onc) and Dr. Fagan (radiation oncology). She is s/p 4 cycles of cisplatin and concurrent chemoradiation. Per last Gyn onc clinic note form 8/2019, most recent CT showing no evidence of disease.    Major Depression Disorder: follow with psychology, last appointment 3/18/19. She was told to follow up as needed. She had been started on Abilify during her admission in 12/2019 but states that she has not taken this for the past several months since discharge from the hospital. She denies visual or auditory hallucinations. She does not have thoughts of wanting to harm herselt. She is currently on cymbalta.     HTN: controlled, currently takes Lisinopril 10mg daily. She requests a refill on this today.    GERD: she takes omeprazole daily.  She reports having symptoms of chest discomfort about 4 months ago after eating but states that this has improved since she remains sitting up after eating as opposed to lying down which she had previously done. She requests refill on omeprazole today.    Chronic lower Back pain: She states that she has had chronic back pain for the past few years. She feels that the pain has gotten worse in the last few months. She reports that the is achy 9/10 pain and worse with bending of the spine. She states that the pain goes away almost completely where she is lying down in her recliner. She occasionally feels sciatica symptoms in her right leg. She does not have bowel or bladder  incontinence. She is scheduled to see physical medicine and rehabilitation next Thursday.    Dyspnea: She reports having dyspnea with exertion for the past year. She states that this occurs when she is working around her house but that she is able to still perform her daily ADLs. Stress test from 4/2019 was negative for ischemia with EF 65% and normal diastolic function. CT chest from 4/2019 shows normal lung tissue without suggestion of fibrosis. She denies chest pain, palpitations, or claudication. She states that she does not exercise on home. She denies wheezes and is a non-smoker.    HM: She is up to date on her colonoscopy and MMG. She is also up to date on her Tdap booster vaccine. She is amendable to getting the flu vaccine today.       Review of Systems   Constitutional: Negative for chills and fever.   HENT: Negative for hearing loss and sore throat.    Eyes: Negative for blurred vision and double vision.   Respiratory: Positive for shortness of breath. Negative for cough.    Cardiovascular: Negative for chest pain and leg swelling.   Gastrointestinal: Negative for constipation, diarrhea, nausea and vomiting.   Genitourinary: Negative for dysuria and hematuria.   Musculoskeletal: Negative for falls and myalgias.   Skin: Negative for itching and rash.   Neurological: Negative for dizziness, speech change, weakness and headaches.   Psychiatric/Behavioral: Negative for hallucinations. The patient is not nervous/anxious.        Past Medical History:   Diagnosis Date    Anxiety     Cervical cancer 2014    Chronic back pain     Depression     Diarrhea due to malabsorption 11/14/2018    Fibromyalgia     GERD (gastroesophageal reflux disease)     Hiatal hernia 2014    History of cervical cancer 10/11/2018    Hx of psychiatric care     Cymbalta, trazodone    Hypertension     Hypomagnesemia 11/21/2018    Lactose intolerance     Metastatic squamous cell carcinoma to lymph node 10/11/2018    Neuropathy  due to chemotherapeutic drug 11/14/2018    Osteoarthritis of back     Psychiatric problem     Stroke 1972       Family History   Problem Relation Age of Onset    Heart disease Sister     Heart disease Maternal Grandmother     Colon cancer Father     Esophageal cancer Father     Cancer Father         Lung-smoker    Cancer Mother         Cervical    Cervical cancer Mother     Breast cancer Maternal Aunt     Suicide Daughter         jumped from parking structure    Drug abuse Daughter     Drug abuse Daughter     Rectal cancer Neg Hx     Stomach cancer Neg Hx     Crohn's disease Neg Hx     Ulcerative colitis Neg Hx     Diabetes Neg Hx     Hypertension Neg Hx         reports that she has quit smoking. She has never used smokeless tobacco. She reports that she does not drink alcohol or use drugs.    Medication List with Changes/Refills   Current Medications    CHOLECALCIFEROL, VITAMIN D3, 2,000 UNIT CAP    Take 1 capsule by mouth once daily.    CYCLOBENZAPRINE (FLEXERIL) 10 MG TABLET    TAKE 1 TABLET (10 MG TOTAL) BY MOUTH NIGHTLY AS NEEDED FOR MUSCLE SPASMS.    DULOXETINE (CYMBALTA) 60 MG CAPSULE    Take 1 capsule (60 mg total) by mouth once daily.    GABAPENTIN (NEURONTIN) 300 MG CAPSULE    Take 1 capsule (300 mg total) by mouth 3 (three) times daily.    MAGNESIUM OXIDE (MAG-OX) 400 MG (241.3 MG MAGNESIUM) TABLET    Take 1 tablet (400 mg total) by mouth 3 (three) times daily.    OXYCODONE-ACETAMINOPHEN (PERCOCET) 5-325 MG PER TABLET    Take 1 tablet by mouth every 4 (four) hours as needed.    POTASSIUM CHLORIDE SA (K-DUR,KLOR-CON) 20 MEQ TABLET    Take 20 mEq by mouth once daily.    TRAMADOL (ULTRAM) 50 MG TABLET    Take 50 mg by mouth every 6 (six) hours as needed for Pain.    TRAZODONE (DESYREL) 50 MG TABLET    Take 1 tablet (50 mg total) by mouth every evening.   Changed and/or Refilled Medications    Modified Medication Previous Medication    LISINOPRIL 10 MG TABLET lisinopril 10 MG tablet        Take 1 tablet (10 mg total) by mouth once daily.    Take 1 tablet (10 mg total) by mouth once daily.    MECLIZINE (ANTIVERT) 25 MG TABLET meclizine (ANTIVERT) 25 mg tablet       Take 1 tablet (25 mg total) by mouth 3 (three) times daily as needed for Dizziness.    Take 1 tablet (25 mg total) by mouth 3 (three) times daily as needed for Dizziness.    OMEPRAZOLE (PRILOSEC) 40 MG CAPSULE omeprazole (PRILOSEC) 40 MG capsule       Take 1 capsule (40 mg total) by mouth once daily.    Take 1 capsule (40 mg total) by mouth once daily.   Discontinued Medications    ARIPIPRAZOLE (ABILIFY) 2 MG TAB    Take 1 tablet (2 mg total) by mouth once daily.     Review of patient's allergies indicates:   Allergen Reactions    Bee sting [allergen ext-venom-honey bee]      Rash      Grass pollen-bermuda, standard      rash       Patient Active Problem List   Diagnosis    Osteoarthritis of back    Hiatal hernia    Essential hypertension    Lower extremity pain, diffuse    Weakness of both lower extremities    Impaired functional mobility, balance, gait, and endurance    Schatzki's ring    Colon polyp    Internal hemorrhoids    Cardiovascular event risk -- low    Hemifacial spasm    Severe episode of recurrent major depressive disorder, with psychotic features    Fibromyalgia    Facial palsy    Blood in stool    Sleep stage dysfunction    Carpal tunnel syndrome on right    Weakness    Difficulty walking    Gastroesophageal reflux disease with esophagitis    Abnormal CT of the abdomen    Lymphadenopathy    History of cervical cancer    Metastatic squamous cell carcinoma to lymph node    Severe recurrent major depression without psychotic features    Generalized anxiety disorder    PTSD (post-traumatic stress disorder)    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 drug    Diarrhea due to malabsorption    Seizure-like activity    Stroke    Electrolyte disorder           Objective:      /78 (BP Location: Left  "arm, Patient Position: Sitting, BP Method: Large (Manual))   Pulse 88   Ht 5' 9" (1.753 m)   Wt 106 kg (233 lb 11 oz)   LMP 06/08/2014 (Approximate)   SpO2 95%   BMI 34.51 kg/m²   Estimated body mass index is 34.51 kg/m² as calculated from the following:    Height as of this encounter: 5' 9" (1.753 m).    Weight as of this encounter: 106 kg (233 lb 11 oz).  Physical Exam   Constitutional: She is oriented to person, place, and time and well-developed, well-nourished, and in no distress. No distress.   HENT:   Head: Normocephalic and atraumatic.   Right Ear: External ear normal.   Left Ear: External ear normal.   Nose: Nose normal.   Eyes: Conjunctivae and EOM are normal. Right eye exhibits no discharge. Left eye exhibits no discharge. No scleral icterus.   Neck: No thyromegaly present.   Cardiovascular: Normal rate, regular rhythm and normal heart sounds. Exam reveals no gallop and no friction rub.   No murmur heard.  Pulmonary/Chest: Effort normal and breath sounds normal. No respiratory distress. She has no wheezes. She has no rales.   Abdominal: Soft. Bowel sounds are normal. She exhibits no distension. There is no tenderness. There is no rebound and no guarding.   Musculoskeletal: She exhibits no edema.   Neurological: She is alert and oriented to person, place, and time. GCS score is 15.   Skin: Skin is warm and dry. She is not diaphoretic.   Psychiatric: Mood, memory, affect and judgment normal.   Nursing note and vitals reviewed.        Assessment and Plan:         Edita was seen today for medication refill.    Diagnoses and all orders for this visit:    Hypertension, unspecified type  - controlled, will refill lisinopril 10mg today    Gastroesophageal reflux disease with esophagitis  - stable, will refill omeprazole 40mg daily    History of Recurrent Cervical Cancer  -follows with Gyn onc (next appointment 11/2019) and radiation oncology    Major Depression Disorder  -stable  -denies visual or " auditory hallucinations, no SI  -continue cymbalta    Chronic Lower Back Pain  -scheduled to see physical medicine and rehabilitation next week    Dyspnea:  -stress echo from 4/2019 was negative for ischemia with EF 65% and normal diastolic function;   -CT chest from 4/2019 shows normal lung tissue imaging without suggestion of fibrosis.  -etiology likely due to deconditioning, discussed need for incorporating physical activity into daily schedule  -scheduled to see physical medicine and rehabilitation next week    Health Maintenance  - MMG and c-scope up to date  -vaccines up to date  -will get hemoglobin a1c as last on file was 2017   -     Hemoglobin A1c; Future      Follow up in about 6 months (around 3/19/2020).    Other Orders Placed This Visit:  Orders Placed This Encounter   Procedures    Hemoglobin A1c         Audrey Mustafa    Discussed with Dr. Monroe

## 2019-09-23 NOTE — PROGRESS NOTES
I have reviewed the notes, assessments, and/or procedures performed by Dr. Mustafa, I concur with her documentation of Edita Riley.

## 2019-09-25 ENCOUNTER — INITIAL CONSULT (OUTPATIENT)
Dept: PHYSICAL MEDICINE AND REHAB | Facility: CLINIC | Age: 54
End: 2019-09-25
Payer: MEDICAID

## 2019-09-25 ENCOUNTER — HOSPITAL ENCOUNTER (OUTPATIENT)
Dept: RADIOLOGY | Facility: HOSPITAL | Age: 54
Discharge: HOME | End: 2019-09-25
Attending: PHYSICAL MEDICINE & REHABILITATION
Payer: MEDICAID

## 2019-09-25 VITALS
SYSTOLIC BLOOD PRESSURE: 114 MMHG | BODY MASS INDEX: 34.51 KG/M2 | WEIGHT: 233 LBS | HEART RATE: 91 BPM | DIASTOLIC BLOOD PRESSURE: 76 MMHG | HEIGHT: 69 IN

## 2019-09-25 DIAGNOSIS — M79.7 FIBROMYALGIA: Primary | ICD-10-CM

## 2019-09-25 DIAGNOSIS — M17.31 POST-TRAUMATIC OSTEOARTHRITIS OF RIGHT KNEE: ICD-10-CM

## 2019-09-25 DIAGNOSIS — G56.01 CARPAL TUNNEL SYNDROME ON RIGHT: ICD-10-CM

## 2019-09-25 DIAGNOSIS — M47.816 LUMBAR SPONDYLOSIS: ICD-10-CM

## 2019-09-25 PROCEDURE — 99999 PR PBB SHADOW E&M-EST. PATIENT-LVL III: CPT | Mod: PBBFAC,,, | Performed by: PHYSICAL MEDICINE & REHABILITATION

## 2019-09-25 PROCEDURE — 73560 X-RAY EXAM OF KNEE 1 OR 2: CPT | Mod: 26,50,, | Performed by: RADIOLOGY

## 2019-09-25 PROCEDURE — 73560 X-RAY EXAM OF KNEE 1 OR 2: CPT | Mod: TC,50,FY,PO

## 2019-09-25 PROCEDURE — 99204 PR OFFICE/OUTPT VISIT, NEW, LEVL IV, 45-59 MIN: ICD-10-PCS | Mod: S$PBB,,, | Performed by: PHYSICAL MEDICINE & REHABILITATION

## 2019-09-25 PROCEDURE — 99213 OFFICE O/P EST LOW 20 MIN: CPT | Mod: PBBFAC,PO | Performed by: PHYSICAL MEDICINE & REHABILITATION

## 2019-09-25 PROCEDURE — 99999 PR PBB SHADOW E&M-EST. PATIENT-LVL III: ICD-10-PCS | Mod: PBBFAC,,, | Performed by: PHYSICAL MEDICINE & REHABILITATION

## 2019-09-25 PROCEDURE — 99204 OFFICE O/P NEW MOD 45 MIN: CPT | Mod: S$PBB,,, | Performed by: PHYSICAL MEDICINE & REHABILITATION

## 2019-09-25 PROCEDURE — 73560 XR KNEE AP STANDING WITH BOTH LATERAL: ICD-10-PCS | Mod: 26,50,, | Performed by: RADIOLOGY

## 2019-09-25 NOTE — LETTER
September 25, 2019      Refugio Benjamin MD  [address]  [facility] [address]           [facility]  [address]  [facility] [address]  Phone: [phone]          Patient: Edita Riley   MR Number: [id number]   YOB: 1965   Date of Visit: 9/25/2019       Dear Dr. Refugio Benjamin:    Thank you for referring Edita Riley to me for evaluation. Attached you will find relevant portions of my assessment and plan of care.    If you have questions, please do not hesitate to call me. I look forward to following Edita Riley along with you.    Sincerely,    Carlos Ramirez, DO    Enclosure  CC:  No Recipients    If you would like to receive this communication electronically, please contact [email or web address] or [phone] to request more information on [employer] Link access.    For providers and/or their staff who would like to refer a patient to [facility], please contact us through our one-stop-shop provider referral line, [facility], at [phone].    If you feel you have received this communication in error or would no longer like to receive these types of communications, please e-mail [email or web address]

## 2019-09-25 NOTE — PROGRESS NOTES
[facility] Initial Visit Note    Chief Complaint: Diffuse MSK pain    HPI: Edita Riley is a 54 y.o. female who presents for evaluation of diffuse musculoskeletal pain (low back, right leg, flanks, right hand, shoulders), referred by Dr. Refugio Benjamin.    Pain is located at low back, right leg, flanks, right hand, shoulders without radiation.   No one pain worse than another.    It has been present for several years, and began without inciting injury.  The pain is described as punching, sharp    It is aggravated by moving around  It is alleviated by rest  She does get right hand swelling, and feet swelling     (+) numbness/tingling- in right hand  (+) weakness- right knee  (--) bowel/bladder incontinence  (--) recent fevers  (+) poor sleep  (+) stress by the chronic pain    Therapeutic interventions thus far:  Medications: flexeril (helped a little), gabapentin (does help), percocet (doesn't help), tramadol (doesn't help), cymbalta (does help)  Lifestyle changes: eats poor diet  Bracing/Modalities: heat (does help some)  Therapy: PT (did aquatherapy last year)  Injections: none  Surgery: none      PMH:   Past Medical History:   Diagnosis Date    Anxiety     Cervical cancer 2014    Chronic back pain     Depression     Diarrhea due to malabsorption 11/14/2018    Fibromyalgia     GERD (gastroesophageal reflux disease)     Hiatal hernia 2014    History of cervical cancer 10/11/2018    Hx of psychiatric care     Cymbalta, trazodone    Hypertension     Hypomagnesemia 11/21/2018    Lactose intolerance     Metastatic squamous cell carcinoma to lymph node 10/11/2018    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 drug 11/14/2018    Osteoarthritis of back     Psychiatric problem     Stroke 1972                 Past Surgical History:   Procedure Laterality Date    BILATERAL OOPHORECTOMY  2015    CHOLECYSTECTOMY  11/09/2016    Done at [facility], path showed chronic cholecystitis and gallstones     cold knife conization  2014    COLONOSCOPY  2014    [facility]    COLONOSCOPY N/A 5/18/2018    Procedure: COLONOSCOPY;  Surgeon: Arden Gutiérrez MD;  Location: [facility] (4TH FLR);  Service: Endoscopy;  Laterality: N/A;    ESOPHAGOGASTRODUODENOSCOPY  2014    HYSTERECTOMY  2014    [facility] for cervical cancer    OOPHORECTOMY      RETROPERITONEAL LYMPHADENECTOMY Right 9/17/2018    Procedure: LYMPHADENECTOMY, RETROPERITONEUM;  Surgeon: Sebas Reed MD;  [facility];  Service: General;  Laterality: Right;  ILIAC       Family Hx:   Family History   Problem Relation Age of Onset    Heart disease Sister     Heart disease Maternal Grandmother     Colon cancer Father     Esophageal cancer Father     Cancer Father         Lung-smoker    Cancer Mother         Cervical    Cervical cancer Mother     Breast cancer Maternal Aunt     Suicide Daughter         jumped from parking structure    Drug abuse Daughter     Drug abuse Daughter     Rectal cancer Neg Hx     Stomach cancer Neg Hx     Crohn's disease Neg Hx     Ulcerative colitis Neg Hx     Diabetes Neg Hx     Hypertension Neg Hx         Social History:   Social History     Socioeconomic History    Marital status:      Spouse name: Hammad    Number of children: 2    Years of education: Not on file    Highest education level: Not on file   Occupational History    Not on file   Social Needs    Financial resource strain: Not on file    Food insecurity:     Worry: Not on file     Inability: Not on file    Transportation needs:     Medical: Not on file     Non-medical: Not on file   Tobacco Use    Smoking status: Former Smoker    Smokeless tobacco: Never Used   Substance and Sexual Activity    Alcohol use: No     Alcohol/week: 0.0 standard drinks    Drug use: No    Sexual activity: Yes     Partners: Male     Birth control/protection: None     Comment:  19 years since 1999    Lifestyle    Physical activity:     Days per week: Not on file     Minutes per session: Not on file    Stress: Not on file   Relationships    Social connections:     Talks on phone: Not on file     Gets together: Not on file     Attends Taoist service: Not on file     Active member of club or organization: Not on file     Attends meetings of clubs or organizations: Not on file     Relationship status: Not on file   Other Topics Concern    Patient feels they ought to cut down on drinking/drug use Not Asked    Patient annoyed by others criticizing their drinking/drug use Not Asked    Patient has felt bad or guilty about drinking/drug use Not Asked    Patient has had a drink/used drugs as an eye opener in the AM Not Asked   Social History Narrative    , twin daughters (1  2018), disabled due to childhood stroke, Zoroastrianism sophia       Allergies:   Review of patient's allergies indicates:   Allergen Reactions    Bee sting [allergen ext-venom-honey bee]      Rash      Grass pollen-bermuda, standard      rash       Current Meds:   Current Outpatient Medications   Medication Sig Dispense Refill    cholecalciferol, vitamin D3, 2,000 unit Cap Take 1 capsule by mouth once daily.      cyclobenzaprine (FLEXERIL) 10 MG tablet TAKE 1 TABLET (10 MG TOTAL) BY MOUTH NIGHTLY AS NEEDED FOR MUSCLE SPASMS.  1    DULoxetine (CYMBALTA) 60 MG capsule Take 1 capsule (60 mg total) by mouth once daily. 90 capsule 3    gabapentin (NEURONTIN) 300 MG capsule Take 1 capsule (300 mg total) by mouth 3 (three) times daily. 270 capsule 3    lisinopril 10 MG tablet Take 1 tablet (10 mg total) by mouth once daily. 90 tablet 3    magnesium oxide (MAG-OX) 400 mg (241.3 mg magnesium) tablet Take 1 tablet (400 mg total) by mouth 3 (three) times daily. 90 tablet 1    meclizine (ANTIVERT) 25 mg tablet Take 1 tablet (25 mg total) by mouth 3 (three) times daily as needed for Dizziness. 30 tablet 6    omeprazole  "(PRILOSEC) 40 MG capsule Take 1 capsule (40 mg total) by mouth once daily. 90 capsule 3    oxyCODONE-acetaminophen (PERCOCET) 5-325 mg per tablet Take 1 tablet by mouth every 4 (four) hours as needed. 28 tablet 0    potassium chloride SA (K-DUR,KLOR-CON) 20 MEQ tablet Take 20 mEq by mouth once daily.  1    traMADol (ULTRAM) 50 mg tablet Take 50 mg by mouth every 6 (six) hours as needed for Pain.      traZODone (DESYREL) 50 MG tablet Take 1 tablet (50 mg total) by mouth every evening. 90 tablet 3     No current facility-administered medications for this visit.        Review of Systems:  11 Point ROS (constitutional,HEENT,Resp,CV,GI,,Skin,Endo/Heme/Allergy,Psych,Neuro,MSK) negative aside from what is mentioned in HPI above.      Physical Exam:  /76   Pulse 91   Ht 5' 9" (1.753 m)   Wt 105.7 kg (233 lb)   LMP 06/08/2014 (Approximate)   BMI 34.41 kg/m²   General/Constitutional: Awake, in no acute distress  Psych: Normal affect  CV: Warm, well perfused extremities  Resp: Normal rate, unlabored breathing  Skin: No erythema or rash on exposed skin  Lymph: No lympedema  Lumbar Spine Exam:  Inspection: Pelvis is asymmetric,  Lumbar lordotic curvature is normal (though she assumes a flexed forward posture)  Palpation: There was tenderness to palpation diffusely along lumbar paraspinals, thoracic paraspinals, socorro-scapular muscles, traps, hamstrings, and thighs- no triggering, only tenderpoints  ROM: ROM was Slightly decreased.  There was pain with ROM.    Provocative tests:   (--) SLR- pain in low back without radicular symptoms  Provocative testing difficult as most maneuvers did cause pain  Neurovascular test:  Feet are warm and well perfused.    Reflexes are normal and symmetric in the lower extremities  Motor exam showed 5/5 strength in the bilateral hip flexors, knee extensors, ankle dorsiflexors, ankle plantarflexors, and toe extensors.    right Knee Exam:  Inspection: No gross abnormalities on exam.  " No joint warmth or erythema.    Palpation: There was tenderness to palpation diffusely.(+) crepitus  ROM: ROM was Normal.  There was not pain with ROM.  Provocative tests:   (--) Lachmans- no significant pain, but there is laxity  (--) McMurrays    Labs:  Sodium   Date Value Ref Range Status   05/29/2019 139 136 - 145 mmol/L Final     Potassium   Date Value Ref Range Status   05/29/2019 3.9 3.5 - 5.1 mmol/L Final     Chloride   Date Value Ref Range Status   05/29/2019 103 95 - 110 mmol/L Final     CO2   Date Value Ref Range Status   05/29/2019 24 23 - 29 mmol/L Final     Glucose   Date Value Ref Range Status   05/29/2019 164 (H) 70 - 110 mg/dL Final     BUN, Bld   Date Value Ref Range Status   05/29/2019 16 6 - 20 mg/dL Final     Creatinine   Date Value Ref Range Status   05/29/2019 1.2 0.5 - 1.4 mg/dL Final     Calcium   Date Value Ref Range Status   05/29/2019 9.4 8.7 - 10.5 mg/dL Final     Total Protein   Date Value Ref Range Status   05/29/2019 7.2 6.0 - 8.4 g/dL Final     Albumin   Date Value Ref Range Status   05/29/2019 3.4 (L) 3.5 - 5.2 g/dL Final     Total Bilirubin   Date Value Ref Range Status   05/29/2019 0.3 0.1 - 1.0 mg/dL Final     Comment:     For infants and newborns, interpretation of results should be based  on gestational age, weight and in agreement with clinical  observations.  Premature Infant recommended reference ranges:  Up to 24 hours.............<8.0 mg/dL  Up to 48 hours............<12.0 mg/dL  3-5 days..................<15.0 mg/dL  6-29 days.................<15.0 mg/dL       Alkaline Phosphatase   Date Value Ref Range Status   05/29/2019 106 55 - 135 U/L Final     AST   Date Value Ref Range Status   05/29/2019 14 10 - 40 U/L Final     ALT   Date Value Ref Range Status   05/29/2019 15 10 - 44 U/L Final     Anion Gap   Date Value Ref Range Status   05/29/2019 12 8 - 16 mmol/L Final     eGFR if    Date Value Ref Range Status   05/29/2019 58.4 (A) >60 mL/min/1.73 m^2 Final      eGFR if non    Date Value Ref Range Status   05/29/2019 50.6 (A) >60 mL/min/1.73 m^2 Final     Comment:     Calculation used to obtain the estimated glomerular filtration  rate (eGFR) is the CKD-EPI equation.        Lab Results   Component Value Date    WBC 5.31 05/29/2019    HGB 11.8 (L) 05/29/2019    HCT 36.9 (L) 05/29/2019    MCV 91 05/29/2019     05/29/2019     No results found for: CRP  No results found for: SEDRATE  Lab Results   Component Value Date    HGBA1C 5.6 11/16/2017     Vitamin D (4/10/2018): 13      Imaging: I personally reviewed imaging today  XR L spine (7/19/2018): Vertebral bodies are normal in height without evidence of fracture.  Normal sagittal alignment is preserved.  No spondylolisthesis. No abnormal translation with flexion and extension.  Subtle endplate sclerosis at a few levels.  Intervertebral disc heights are well maintained.  Minimal facet arthropathy.    Impression: Edita Riley is a 54 y.o. female who presents with:  1. Diffuse musculoskeletal pain- myofascial in nature, suspect fibromyalgia.  Affects arms and legs bilaterally, and axially   2. Right knee pain- history of remote trauma, exam with laxity of ACL.  Also with left knee pain, but less so.  I suspect that she has osteoarthritis of her knees as well  3. Low back pain- mild spondylosis on imaging from 2018.  I suspect that most of this is biomechanical and myofascial, related to #1 above  4. Carpal tunnel syndrome right    Plan:   - XR bilateral knees standing  - Physical Therapy to work on core stabilization, knee strengthening and ultimately to progress to a HEP including aerobic exercise  - Depending on which compartments are affected with knee arthritis, knee hinged brace versus medial offloader brace.  Will await films before prescribing  - Continue with wrist splint for CTS as has been helpful  - Pharmcotherapy wise, I don't think there is much to add.  She takes Cymbalta which  can help both with the fibromyalgia and knee arthritis.  She also takes gabapentin 300mg TID.  She is also currently taking magensium, which can help with sleep and muscle tightness.  Will try to avoid PO NSAID given her GI history.    - Discussed lifestyle changes today- anti-inflammatory and gluten free diet.  She is open to trying.    - Follow up with me in 3 months to follow progress    Carlos Ramirez, DO  Physical Medicine and Rehabilitation      Addendum (9/25/2019 130 PM)  Reviewed XR- only mild osteoarthritis on imaging, and probably a bit worse on the left, less symptomatic side.  Will Rx a neoprene knee sleeve instead of more rigid hinged brace to give some support for the right knee.  No need to offload medial compartment given these findings.  Attempted to call patient to explain, but no answer.

## 2019-09-25 NOTE — PATIENT INSTRUCTIONS
"Anti-inflammatory diet general tips:  1. Plant based (does not have to be plant exclusive).  Aim for 75% of your plate being plants.  2. Eat real, whole foods.  Avoid processed food (any food in can or box, or with ingredients that your grandmother wouldn't know.  Especially avoid hydrogenated oils and refined processed sugar)  3. Eat a variety of foods of different colors.  "Eat The [place]"  4. Eat healthy fats (ex: olive oil, avocado oil, nuts, omega 3 rich fatty fish, walnuts, coconut) and avoid any hydrogenated/processed fats, soybean oil, peanut oil, or corn oil  5. Drink plenty of water  6. Consider avoiding or significantly limiting common food sensitivities (dairy, gluten, soy)  7.  Try to incorporate known anti-inflammatory spices into your diet unless you have a known allergy to them (turmeric, aki, garlic, allspice, cinnamon, cumin, fennel for example).    "

## 2019-10-16 ENCOUNTER — CLINICAL SUPPORT (OUTPATIENT)
Dept: REHABILITATION | Facility: HOSPITAL | Age: 54
End: 2019-10-16
Attending: PHYSICAL MEDICINE & REHABILITATION
Payer: MEDICAID

## 2019-10-16 DIAGNOSIS — M54.41 BILATERAL LOW BACK PAIN WITH BILATERAL SCIATICA, UNSPECIFIED CHRONICITY: ICD-10-CM

## 2019-10-16 DIAGNOSIS — M53.86 DECREASED ROM OF LUMBAR SPINE: ICD-10-CM

## 2019-10-16 DIAGNOSIS — M25.562 ACUTE PAIN OF BOTH KNEES: ICD-10-CM

## 2019-10-16 DIAGNOSIS — R29.898 LEG WEAKNESS, BILATERAL: ICD-10-CM

## 2019-10-16 DIAGNOSIS — M54.42 BILATERAL LOW BACK PAIN WITH BILATERAL SCIATICA, UNSPECIFIED CHRONICITY: ICD-10-CM

## 2019-10-16 DIAGNOSIS — M25.561 ACUTE PAIN OF BOTH KNEES: ICD-10-CM

## 2019-10-16 PROBLEM — M25.569 KNEE PAIN: Status: ACTIVE | Noted: 2019-10-16

## 2019-10-16 PROBLEM — M54.9 BACK PAIN: Status: ACTIVE | Noted: 2019-10-16

## 2019-10-16 PROCEDURE — 97110 THERAPEUTIC EXERCISES: CPT

## 2019-10-16 PROCEDURE — 97161 PT EVAL LOW COMPLEX 20 MIN: CPT
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
## 2019-10-22 ENCOUNTER — CLINICAL SUPPORT (OUTPATIENT)
Dept: REHABILITATION | Facility: HOSPITAL | Age: 54
End: 2019-10-22
Attending: PHYSICAL MEDICINE & REHABILITATION
Payer: MEDICAID

## 2019-10-22 DIAGNOSIS — M54.42 BILATERAL LOW BACK PAIN WITH BILATERAL SCIATICA, UNSPECIFIED CHRONICITY: ICD-10-CM

## 2019-10-22 DIAGNOSIS — M25.562 ACUTE PAIN OF BOTH KNEES: ICD-10-CM

## 2019-10-22 DIAGNOSIS — M54.41 BILATERAL LOW BACK PAIN WITH BILATERAL SCIATICA, UNSPECIFIED CHRONICITY: ICD-10-CM

## 2019-10-22 DIAGNOSIS — M25.561 ACUTE PAIN OF BOTH KNEES: ICD-10-CM

## 2019-10-22 DIAGNOSIS — R29.898 LEG WEAKNESS, BILATERAL: ICD-10-CM

## 2019-10-22 DIAGNOSIS — M53.86 DECREASED ROM OF LUMBAR SPINE: ICD-10-CM

## 2019-10-22 PROCEDURE — 97110 THERAPEUTIC EXERCISES: CPT

## 2019-10-22 NOTE — PROGRESS NOTES
"  Physical Therapy Daily Treatment Note     Name: Edita Hardin [facility]  Clinic Number: [id number]    Therapy Diagnosis:   Encounter Diagnoses   Name Primary?    Bilateral low back pain with bilateral sciatica, unspecified chronicity     Acute pain of both knees     Leg weakness, bilateral     Decreased ROM of lumbar spine      Physician: Carlos Ramirez,*    Visit Date: 10/22/2019    Physician Orders: PT Eval and Treat   Medical Diagnosis from Referral:   M17.31 (ICD-10-CM) - Post-traumatic osteoarthritis of right knee  M47.816 (ICD-10-CM) - Lumbar spondylosis  M79.7 (ICD-10-CM) - Fibromyalgia  Evaluation Date: 10/16/2019  Authorization Period Expiration: 10/31/2019  Plan of Care Expiration: 11/29/2019  Visit # / Visits authorized: 2/ TBD  FOTO: 2/5        Time In: 8:20 am   Time Out: 9:03 am  Total Billable Time: 30 minutes    Precautions: Standard and HTN, hx of cancer    Subjective     Pt reports: experiencing pain in (B) lower back extending down back of (B) LE prior to therapy session.   She was compliant with home exercise program.  Response to previous treatment: last session was initial evaluation   Functional change: none    Pain: 9/10  Location: bilateral lower back extending into (B) LE      Objective       Edita received therapeutic exercises to develop strength, ROM, flexibility, posture and core stabilization for 43 minutes including:  Nu step x 5'   Bridges 2x10   Pelvic Tilting 5" hold x 15  Supine hamstring stretch 3x30" B  Ball squeezes 5"x20  hookyling hip abduction 2x10 PTB   SAQ 2x10 B  Seated scap retractions 1x10      Home Exercises Provided and Patient Education Provided     Education provided:   - Continue with HEP     Written Home Exercises Provided: Patient instructed to cont prior HEP.  Exercises were reviewed and Edita was able to demonstrate them prior to the end of the session.  Edita demonstrated good  understanding of the education provided.     See EMR under " Patient Instructions for exercises provided initial evaluation.      Assessment     Pt required multiple VC/TC for proper technique when performing scapular retractions. Pt was able to perform all therex without c/o increased pain. Pt reported experiencing a little decrease in pain at the end of therapy session compared to beginning of therapy session.   Edita is progressing well towards her goals.   Pt prognosis is Fair.     Pt will continue to benefit from skilled outpatient physical therapy to address the deficits listed in the problem list box on initial evaluation, provide pt/family education and to maximize pt's level of independence in the home and community environment.     Pt's spiritual, cultural and educational needs considered and pt agreeable to plan of care and goals.    Anticipated barriers to physical therapy: Poor historian and recall ability    Goals:     Short Term Goals (3 Weeks):  1. Pt will be compliant with HEP to supplement PT in decreasing pain with functional mobility.  2. Pt will improve lumbar ROM by 5-10 degrees in all planes to improve functional mobility.  3. Pt will improve impaired LE MMTs by 1/2 score to improve strength for functional tasks.  4. Pt will reduce pain to a 6/10 pain during all lumbar and LE rom  Long Term Goals (6 Weeks):   1. Pt will improve FOTO score to </= 65% limited to decrease perceived limitation with maintaining/changing body position.   2. Pt will perform pallof press with good control to demonstrate improved core strength.  3. Pt will improve impaired LE MMTs by 1 score to improve strength for functional tasks.  4. Pt will report no pain during lumbar and LE ROM to promote functional mobility.     Plan     Continue per POC, progress as tolerated    Charito Be, PT

## 2019-10-29 ENCOUNTER — CLINICAL SUPPORT (OUTPATIENT)
Dept: REHABILITATION | Facility: HOSPITAL | Age: 54
End: 2019-10-29
Attending: PHYSICAL MEDICINE & REHABILITATION
Payer: MEDICAID

## 2019-10-29 DIAGNOSIS — M54.42 BILATERAL LOW BACK PAIN WITH BILATERAL SCIATICA, UNSPECIFIED CHRONICITY: ICD-10-CM

## 2019-10-29 DIAGNOSIS — M54.41 BILATERAL LOW BACK PAIN WITH BILATERAL SCIATICA, UNSPECIFIED CHRONICITY: ICD-10-CM

## 2019-10-29 DIAGNOSIS — M25.561 ACUTE PAIN OF BOTH KNEES: ICD-10-CM

## 2019-10-29 DIAGNOSIS — M25.562 ACUTE PAIN OF BOTH KNEES: ICD-10-CM

## 2019-10-29 DIAGNOSIS — M53.86 DECREASED ROM OF LUMBAR SPINE: ICD-10-CM

## 2019-10-29 DIAGNOSIS — R29.898 LEG WEAKNESS, BILATERAL: ICD-10-CM

## 2019-10-29 PROCEDURE — 97110 THERAPEUTIC EXERCISES: CPT

## 2019-10-29 NOTE — PROGRESS NOTES
"  Physical Therapy Daily Treatment Note     Name: Edita Hardin [facility]  Clinic Number: [id number]    Therapy Diagnosis:   Encounter Diagnoses   Name Primary?    Bilateral low back pain with bilateral sciatica, unspecified chronicity     Acute pain of both knees     Leg weakness, bilateral     Decreased ROM of lumbar spine      Physician: Carlos Ramirez,*    Visit Date: 10/29/2019    Physician Orders: PT Eval and Treat   Medical Diagnosis from Referral:   M17.31 (ICD-10-CM) - Post-traumatic osteoarthritis of right knee  M47.816 (ICD-10-CM) - Lumbar spondylosis  M79.7 (ICD-10-CM) - Fibromyalgia  Evaluation Date: 10/16/2019  Authorization Period Expiration: 11/29/2019  Plan of Care Expiration: 11/29/2019  Visit # / Visits authorized: 3/12  FOTO: 3/5        Time In: 9:00 am   Time Out: 9:48 am  Total Billable Time: 43 minutes    Precautions: Standard and HTN, hx of cancer    Subjective     Pt reports: her back is pretty good today; mild pain.   She was compliant with home exercise program.  Response to previous treatment: no adverse effects   Functional change: none    Pain: 4/10  Location: bilateral lower back extending into (B) LE      Objective       Edita received therapeutic exercises to develop strength, ROM, flexibility, posture and core stabilization for 48 minutes including:    Nu step x 5'   Bridges 2x10   Pelvic Tilting 5" hold x 20  Supine hamstring stretch 3x30" B  SLR x10   Ball squeezes 5"x20  hookyling hip abduction 3x10 PTB   SAQ 2x10 B  Seated scap retractions 2x10      Home Exercises Provided and Patient Education Provided     Education provided:   - Continue with HEP     Written Home Exercises Provided: Patient instructed to cont prior HEP.  Exercises were reviewed and Edita was able to demonstrate them prior to the end of the session.  Edita demonstrated good  understanding of the education provided.     See EMR under Patient Instructions for exercises provided initial " evaluation.      Assessment     Pt required verbal cueing and demonstration to be able to perform most exercises correctly today. Pt was able to perform all therex without c/o increased pain. Pt given theraband to perform exercises at home.   Edita is progressing well towards her goals.   Pt prognosis is Fair.     Pt will continue to benefit from skilled outpatient physical therapy to address the deficits listed in the problem list box on initial evaluation, provide pt/family education and to maximize pt's level of independence in the home and community environment.     Pt's spiritual, cultural and educational needs considered and pt agreeable to plan of care and goals.    Anticipated barriers to physical therapy: Poor historian and recall ability    Goals:     Short Term Goals (3 Weeks):  1. Pt will be compliant with HEP to supplement PT in decreasing pain with functional mobility.  2. Pt will improve lumbar ROM by 5-10 degrees in all planes to improve functional mobility.  3. Pt will improve impaired LE MMTs by 1/2 score to improve strength for functional tasks.  4. Pt will reduce pain to a 6/10 pain during all lumbar and LE rom  Long Term Goals (6 Weeks):   1. Pt will improve FOTO score to </= 65% limited to decrease perceived limitation with maintaining/changing body position.   2. Pt will perform pallof press with good control to demonstrate improved core strength.  3. Pt will improve impaired LE MMTs by 1 score to improve strength for functional tasks.  4. Pt will report no pain during lumbar and LE ROM to promote functional mobility.     Plan     Continue per POC, progress as tolerated    Jessy Tijerina, PT

## 2019-10-31 ENCOUNTER — CLINICAL SUPPORT (OUTPATIENT)
Dept: REHABILITATION | Facility: HOSPITAL | Age: 54
End: 2019-10-31
Attending: PHYSICAL MEDICINE & REHABILITATION
Payer: MEDICAID

## 2019-10-31 DIAGNOSIS — M25.561 ACUTE PAIN OF BOTH KNEES: ICD-10-CM

## 2019-10-31 DIAGNOSIS — M25.562 ACUTE PAIN OF BOTH KNEES: ICD-10-CM

## 2019-10-31 DIAGNOSIS — M54.42 BILATERAL LOW BACK PAIN WITH BILATERAL SCIATICA, UNSPECIFIED CHRONICITY: ICD-10-CM

## 2019-10-31 DIAGNOSIS — M54.41 BILATERAL LOW BACK PAIN WITH BILATERAL SCIATICA, UNSPECIFIED CHRONICITY: ICD-10-CM

## 2019-10-31 DIAGNOSIS — M53.86 DECREASED ROM OF LUMBAR SPINE: ICD-10-CM

## 2019-10-31 DIAGNOSIS — R29.898 LEG WEAKNESS, BILATERAL: ICD-10-CM

## 2019-10-31 PROCEDURE — 97110 THERAPEUTIC EXERCISES: CPT

## 2019-10-31 NOTE — PROGRESS NOTES
"  Physical Therapy Daily Treatment Note     Name: Edita Hardin [facility]  Clinic Number: [id number]    Therapy Diagnosis:   Encounter Diagnoses   Name Primary?    Bilateral low back pain with bilateral sciatica, unspecified chronicity     Acute pain of both knees     Leg weakness, bilateral     Decreased ROM of lumbar spine      Physician: Carlos Ramirez,*    Visit Date: 10/31/2019    Physician Orders: PT Eval and Treat   Medical Diagnosis from Referral:   M17.31 (ICD-10-CM) - Post-traumatic osteoarthritis of right knee  M47.816 (ICD-10-CM) - Lumbar spondylosis  M79.7 (ICD-10-CM) - Fibromyalgia  Evaluation Date: 10/16/2019  Authorization Period Expiration: 11/29/2019  Plan of Care Expiration: 11/29/2019  Visit # / Visits authorized: 4/12  FOTO: 4/5        Time In: 9:00 am   Time Out: 9:55 am  Total Billable Time: 50 minutes    Precautions: Standard and HTN, hx of cancer    Subjective     Pt reports: her back is a little sore today but not too bad   She was compliant with home exercise program.  Response to previous treatment: no adverse effects   Functional change: none    Pain: 4/10  Location: bilateral lower back extending into (B) LE      Objective       Edita received therapeutic exercises to develop strength, ROM, flexibility, posture and core stabilization for 55 minutes including:    Nu step x 5'   Bridges 3x10   Pelvic Tilting 5" hold x 20  Supine hamstring stretch 3x30" B  SLR x20   Ball squeezes 5"x20  hookyling hip abduction 3x10 PTB   SAQ 2x10 2# B  Seated scap retractions 2x10      Home Exercises Provided and Patient Education Provided     Education provided:   - Continue with HEP     Written Home Exercises Provided: Patient instructed to cont prior HEP.  Exercises were reviewed and Edita was able to demonstrate them prior to the end of the session.  Edita demonstrated good  understanding of the education provided.     See EMR under Patient Instructions for exercises provided " initial evaluation.      Assessment     Pt required verbal cueing for correct form for with SAQs to not lift entire leg and just to straighten knee. Pt required manual assist on SLR with RLE towards end of set due to muscle fatigue. Pt was able to perform all therex without c/o increased pain.   Edita is progressing well towards her goals.   Pt prognosis is Fair.     Pt will continue to benefit from skilled outpatient physical therapy to address the deficits listed in the problem list box on initial evaluation, provide pt/family education and to maximize pt's level of independence in the home and community environment.     Pt's spiritual, cultural and educational needs considered and pt agreeable to plan of care and goals.    Anticipated barriers to physical therapy: Poor historian and recall ability    Goals:     Short Term Goals (3 Weeks):  1. Pt will be compliant with HEP to supplement PT in decreasing pain with functional mobility.  2. Pt will improve lumbar ROM by 5-10 degrees in all planes to improve functional mobility.  3. Pt will improve impaired LE MMTs by 1/2 score to improve strength for functional tasks.  4. Pt will reduce pain to a 6/10 pain during all lumbar and LE rom  Long Term Goals (6 Weeks):   1. Pt will improve FOTO score to </= 65% limited to decrease perceived limitation with maintaining/changing body position.   2. Pt will perform pallof press with good control to demonstrate improved core strength.  3. Pt will improve impaired LE MMTs by 1 score to improve strength for functional tasks.  4. Pt will report no pain during lumbar and LE ROM to promote functional mobility.     Plan     Continue per POC, progress as tolerated    Jessy Tijerina, PT

## 2019-11-08 ENCOUNTER — CLINICAL SUPPORT (OUTPATIENT)
Dept: REHABILITATION | Facility: HOSPITAL | Age: 54
End: 2019-11-08
Attending: PHYSICAL MEDICINE & REHABILITATION
Payer: MEDICAID

## 2019-11-08 DIAGNOSIS — R29.898 LEG WEAKNESS, BILATERAL: ICD-10-CM

## 2019-11-08 DIAGNOSIS — M53.86 DECREASED ROM OF LUMBAR SPINE: ICD-10-CM

## 2019-11-08 DIAGNOSIS — M25.561 ACUTE PAIN OF BOTH KNEES: ICD-10-CM

## 2019-11-08 DIAGNOSIS — M54.41 BILATERAL LOW BACK PAIN WITH BILATERAL SCIATICA, UNSPECIFIED CHRONICITY: ICD-10-CM

## 2019-11-08 DIAGNOSIS — M25.562 ACUTE PAIN OF BOTH KNEES: ICD-10-CM

## 2019-11-08 DIAGNOSIS — M54.42 BILATERAL LOW BACK PAIN WITH BILATERAL SCIATICA, UNSPECIFIED CHRONICITY: ICD-10-CM

## 2019-11-08 PROCEDURE — 97110 THERAPEUTIC EXERCISES: CPT

## 2019-11-08 NOTE — PROGRESS NOTES
"  Physical Therapy Daily Treatment Note     Name: Edita Hardin [facility]  Clinic Number: [id number]    Therapy Diagnosis:   Encounter Diagnoses   Name Primary?    Bilateral low back pain with bilateral sciatica, unspecified chronicity     Acute pain of both knees     Leg weakness, bilateral     Decreased ROM of lumbar spine      Physician: Carlos Ramirez,*    Visit Date: 11/8/2019    Physician Orders: PT Eval and Treat   Medical Diagnosis from Referral:   M17.31 (ICD-10-CM) - Post-traumatic osteoarthritis of right knee  M47.816 (ICD-10-CM) - Lumbar spondylosis  M79.7 (ICD-10-CM) - Fibromyalgia  Evaluation Date: 10/16/2019  Authorization Period Expiration: 11/29/2019  Plan of Care Expiration: 11/29/2019  Visit # / Visits authorized: 5/12  FOTO: 5/5        Time In: 1100  Time Out: 1205  Total Billable Time: 40 minutes    Precautions: Standard and HTN, hx of cancer    Subjective     Pt reports: She has pain in the front and back of her legs today, but not other complaints as of today  She was compliant with home exercise program.  Response to previous treatment: no adverse effects   Functional change: none    Pain: 8/10  Location: bilateral lower back extending into (B) LE      Objective       Edita received therapeutic exercises to develop strength, ROM, flexibility, posture and core stabilization for 55 minutes including:    Nu step x 5'   Bridges 3x10   Pelvic Tilting x 20 w/ kickouts  Supine hamstring stretch 3x30" B  SLR x20 3#  Ball squeezes 5"x20  hookyling hip abduction 3x10 OTB   SAQ 2x10 3# B  Seated scap retractions 2x10    Heat 10' on B knees    FOTO; 74% limitation     Home Exercises Provided and Patient Education Provided     Education provided:   - Continue with HEP     Written Home Exercises Provided: Patient instructed to cont prior HEP.  Exercises were reviewed and Edita was able to demonstrate them prior to the end of the session.  Edita demonstrated good  understanding of the " education provided.     See EMR under Patient Instructions for exercises provided initial evaluation.      Assessment     Pt tolerated all increased intensity in regard to LE and core strengthening without any adverse effects. Pt continues to require verbal cueing for correct form for with SAQs to not lift entire leg and just to straighten knee. Continue to progress as tolerated.   Edita is progressing well towards her goals.   Pt prognosis is Fair.     Pt will continue to benefit from skilled outpatient physical therapy to address the deficits listed in the problem list box on initial evaluation, provide pt/family education and to maximize pt's level of independence in the home and community environment.     Pt's spiritual, cultural and educational needs considered and pt agreeable to plan of care and goals.    Anticipated barriers to physical therapy: Poor historian and recall ability    Goals:     Short Term Goals (3 Weeks):  1. Pt will be compliant with HEP to supplement PT in decreasing pain with functional mobility.  2. Pt will improve lumbar ROM by 5-10 degrees in all planes to improve functional mobility.  3. Pt will improve impaired LE MMTs by 1/2 score to improve strength for functional tasks.  4. Pt will reduce pain to a 6/10 pain during all lumbar and LE rom  Long Term Goals (6 Weeks):   1. Pt will improve FOTO score to </= 65% limited to decrease perceived limitation with maintaining/changing body position.   2. Pt will perform pallof press with good control to demonstrate improved core strength.  3. Pt will improve impaired LE MMTs by 1 score to improve strength for functional tasks.  4. Pt will report no pain during lumbar and LE ROM to promote functional mobility.     Plan     Continue per POC, progress as tolerated    Jose A Kim, PT

## 2019-11-13 ENCOUNTER — CLINICAL SUPPORT (OUTPATIENT)
Dept: REHABILITATION | Facility: HOSPITAL | Age: 54
End: 2019-11-13
Attending: PHYSICAL MEDICINE & REHABILITATION
Payer: MEDICAID

## 2019-11-13 DIAGNOSIS — F51.01 PRIMARY INSOMNIA: ICD-10-CM

## 2019-11-13 DIAGNOSIS — M53.86 DECREASED ROM OF LUMBAR SPINE: ICD-10-CM

## 2019-11-13 DIAGNOSIS — M54.41 BILATERAL LOW BACK PAIN WITH BILATERAL SCIATICA, UNSPECIFIED CHRONICITY: ICD-10-CM

## 2019-11-13 DIAGNOSIS — M54.42 BILATERAL LOW BACK PAIN WITH BILATERAL SCIATICA, UNSPECIFIED CHRONICITY: ICD-10-CM

## 2019-11-13 DIAGNOSIS — M25.562 ACUTE PAIN OF BOTH KNEES: ICD-10-CM

## 2019-11-13 DIAGNOSIS — R29.898 LEG WEAKNESS, BILATERAL: ICD-10-CM

## 2019-11-13 DIAGNOSIS — M25.561 ACUTE PAIN OF BOTH KNEES: ICD-10-CM

## 2019-11-13 PROCEDURE — 97110 THERAPEUTIC EXERCISES: CPT

## 2019-11-13 RX ORDER — TRAZODONE HYDROCHLORIDE 50 MG/1
50 TABLET ORAL NIGHTLY
Qty: 30 TABLET | Refills: 11 | Status: ON HOLD | OUTPATIENT
Start: 2019-11-13 | End: 2020-12-03 | Stop reason: HOSPADM

## 2019-11-13 NOTE — PROGRESS NOTES
"  Physical Therapy Daily Treatment Note     Name: Edita Hardin [facility]  Clinic Number: [id number]    Therapy Diagnosis:   Encounter Diagnoses   Name Primary?    Bilateral low back pain with bilateral sciatica, unspecified chronicity     Acute pain of both knees     Leg weakness, bilateral     Decreased ROM of lumbar spine      Physician: Carlos Ramirez,*    Visit Date: 11/13/2019    Physician Orders: PT Eval and Treat   Medical Diagnosis from Referral:   M17.31 (ICD-10-CM) - Post-traumatic osteoarthritis of right knee  M47.816 (ICD-10-CM) - Lumbar spondylosis  M79.7 (ICD-10-CM) - Fibromyalgia  Evaluation Date: 10/16/2019  Authorization Period Expiration: 11/29/2019  Plan of Care Expiration: 11/29/2019  Visit # / Visits authorized: 6/12  FOTO: 6    Time In: 11:02 am   Time Out: 12:06 pm   Total Billable Time: 31 minutes    Precautions: Standard and HTN, hx of cancer    Subjective     Pt reports: just experiencing pain in lower back today.   She was compliant with home exercise program.  Response to previous treatment: no adverse effects  Functional change: none    Pain: 9/10  Location: bilateral back      Objective       Edita received therapeutic exercises to develop strength, ROM, flexibility, posture and core stabilization for 64 minutes including:    Nu step x 5'   Bridges 3x10   Pelvic Tilting x 20 w/ kickouts  Supine hamstring stretch 3x30" B  SLR x20 3#  Ball squeezes 5"x20  hookyling hip abduction 3x10 OTB   SAQ 2x10 3# B  Seated scap retractions 3x10 3" hold  gastroc stretch 3x30"       Home Exercises Provided and Patient Education Provided     Education provided:   - Continue with HEP     Written Home Exercises Provided: Patient instructed to cont prior HEP.  Exercises were reviewed and Edita was able to demonstrate them prior to the end of the session.  Edita demonstrated good  understanding of the education provided.     See EMR under Patient Instructions for exercises provided " initial evaluation.      Assessment     Pt was able to tolerate all therex without c/o increased pain. Pt reported feeling decreased pain at the end of therapy session compared to beginning of therapy session.   Edita is progressing well towards her goals.   Pt prognosis is Fair.     Pt will continue to benefit from skilled outpatient physical therapy to address the deficits listed in the problem list box on initial evaluation, provide pt/family education and to maximize pt's level of independence in the home and community environment.     Pt's spiritual, cultural and educational needs considered and pt agreeable to plan of care and goals.    Anticipated barriers to physical therapy: Poor historian and recall ability    Goals:     Short Term Goals (3 Weeks):  1. Pt will be compliant with HEP to supplement PT in decreasing pain with functional mobility.  2. Pt will improve lumbar ROM by 5-10 degrees in all planes to improve functional mobility.  3. Pt will improve impaired LE MMTs by 1/2 score to improve strength for functional tasks.  4. Pt will reduce pain to a 6/10 pain during all lumbar and LE rom  Long Term Goals (6 Weeks):   1. Pt will improve FOTO score to </= 65% limited to decrease perceived limitation with maintaining/changing body position.   2. Pt will perform pallof press with good control to demonstrate improved core strength.  3. Pt will improve impaired LE MMTs by 1 score to improve strength for functional tasks.  4. Pt will report no pain during lumbar and LE ROM to promote functional mobility.       Plan     Continue per POC, progress as tolerated    Charito Be, PT

## 2019-11-15 ENCOUNTER — CLINICAL SUPPORT (OUTPATIENT)
Dept: REHABILITATION | Facility: HOSPITAL | Age: 54
End: 2019-11-15
Attending: PHYSICAL MEDICINE & REHABILITATION
Payer: MEDICAID

## 2019-11-15 DIAGNOSIS — M54.41 BILATERAL LOW BACK PAIN WITH BILATERAL SCIATICA, UNSPECIFIED CHRONICITY: ICD-10-CM

## 2019-11-15 DIAGNOSIS — R29.898 LEG WEAKNESS, BILATERAL: ICD-10-CM

## 2019-11-15 DIAGNOSIS — M54.42 BILATERAL LOW BACK PAIN WITH BILATERAL SCIATICA, UNSPECIFIED CHRONICITY: ICD-10-CM

## 2019-11-15 DIAGNOSIS — M25.561 ACUTE PAIN OF BOTH KNEES: ICD-10-CM

## 2019-11-15 DIAGNOSIS — M25.562 ACUTE PAIN OF BOTH KNEES: ICD-10-CM

## 2019-11-15 DIAGNOSIS — M53.86 DECREASED ROM OF LUMBAR SPINE: ICD-10-CM

## 2019-11-15 PROCEDURE — 97110 THERAPEUTIC EXERCISES: CPT

## 2019-11-15 NOTE — PROGRESS NOTES
"  Physical Therapy Daily Treatment Note     Name: Edita Hardin [facility]  Clinic Number: [id number]    Therapy Diagnosis:   Encounter Diagnoses   Name Primary?    Bilateral low back pain with bilateral sciatica, unspecified chronicity     Acute pain of both knees     Leg weakness, bilateral     Decreased ROM of lumbar spine      Physician: Carlos Ramirez,*    Visit Date: 11/15/2019    Physician Orders: PT Eval and Treat   Medical Diagnosis from Referral:   M17.31 (ICD-10-CM) - Post-traumatic osteoarthritis of right knee  M47.816 (ICD-10-CM) - Lumbar spondylosis  M79.7 (ICD-10-CM) - Fibromyalgia  Evaluation Date: 10/16/2019  Authorization Period Expiration: 11/29/2019  Plan of Care Expiration: 11/29/2019  Visit # / Visits authorized: 7/12  FOTO: 7    Time In: 1005   Time Out: 1100   Total Billable Time: 30 minutes    Precautions: Standard and HTN, hx of cancer    Subjective     Pt reports: She feels a lot better when she comes to therapy.   She was compliant with home exercise program.  Response to previous treatment: no adverse effects  Functional change: none    Pain: 9/10  Location: bilateral back      Objective       Edita received therapeutic exercises to develop strength, ROM, flexibility, posture and core stabilization for 55 minutes including:    Nu step x 5'   Bridges 3x10   Pelvic Tilting x 20 w/ kickouts  Supine hamstring stretch 3x30" B  SLR x20 3#  Ball squeezes 5"x20  hookyling hip abduction 3x10 OTB   SAQ 2x10 3# B  Seated scap retractions 3x10 3" hold  gastroc stretch 3x30"       Home Exercises Provided and Patient Education Provided     Education provided:   - Continue with HEP     Written Home Exercises Provided: Patient instructed to cont prior HEP.  Exercises were reviewed and Edita was able to demonstrate them prior to the end of the session.  Edita demonstrated good  understanding of the education provided.     See EMR under Patient Instructions for exercises provided " initial evaluation.      Assessment     Pt with increased time to perform all therex and is easily distracted. Patient reports that she feels great after leaving therapy session despite reporting that her pain was a 7/10. Patient is inconsistent with reporting symptoms and history of pain Continue to progress as tolerated.   Edita is progressing well towards her goals.   Pt prognosis is Fair.     Pt will continue to benefit from skilled outpatient physical therapy to address the deficits listed in the problem list box on initial evaluation, provide pt/family education and to maximize pt's level of independence in the home and community environment.     Pt's spiritual, cultural and educational needs considered and pt agreeable to plan of care and goals.    Anticipated barriers to physical therapy: Poor historian and recall ability    Goals:     Short Term Goals (3 Weeks):  1. Pt will be compliant with HEP to supplement PT in decreasing pain with functional mobility.  2. Pt will improve lumbar ROM by 5-10 degrees in all planes to improve functional mobility.  3. Pt will improve impaired LE MMTs by 1/2 score to improve strength for functional tasks.  4. Pt will reduce pain to a 6/10 pain during all lumbar and LE rom  Long Term Goals (6 Weeks):   1. Pt will improve FOTO score to </= 65% limited to decrease perceived limitation with maintaining/changing body position.   2. Pt will perform pallof press with good control to demonstrate improved core strength.  3. Pt will improve impaired LE MMTs by 1 score to improve strength for functional tasks.  4. Pt will report no pain during lumbar and LE ROM to promote functional mobility.       Plan     Continue per POC, progress as tolerated    Jose A Kim, PT

## 2019-11-19 ENCOUNTER — CLINICAL SUPPORT (OUTPATIENT)
Dept: REHABILITATION | Facility: HOSPITAL | Age: 54
End: 2019-11-19
Attending: PHYSICAL MEDICINE & REHABILITATION
Payer: MEDICAID

## 2019-11-19 DIAGNOSIS — M53.86 DECREASED ROM OF LUMBAR SPINE: ICD-10-CM

## 2019-11-19 DIAGNOSIS — M25.561 ACUTE PAIN OF BOTH KNEES: ICD-10-CM

## 2019-11-19 DIAGNOSIS — M54.42 BILATERAL LOW BACK PAIN WITH BILATERAL SCIATICA, UNSPECIFIED CHRONICITY: ICD-10-CM

## 2019-11-19 DIAGNOSIS — M25.562 ACUTE PAIN OF BOTH KNEES: ICD-10-CM

## 2019-11-19 DIAGNOSIS — M54.41 BILATERAL LOW BACK PAIN WITH BILATERAL SCIATICA, UNSPECIFIED CHRONICITY: ICD-10-CM

## 2019-11-19 DIAGNOSIS — R29.898 LEG WEAKNESS, BILATERAL: ICD-10-CM

## 2019-11-19 PROCEDURE — 97110 THERAPEUTIC EXERCISES: CPT

## 2019-11-19 NOTE — PROGRESS NOTES
"  Physical Therapy Daily Treatment Note     Name: Edita Hardin [facility]  Clinic Number: [id number]    Therapy Diagnosis:   Encounter Diagnoses   Name Primary?    Bilateral low back pain with bilateral sciatica, unspecified chronicity     Acute pain of both knees     Leg weakness, bilateral     Decreased ROM of lumbar spine      Physician: Carlos Ramirez,*    Visit Date: 11/19/2019    Physician Orders: PT Eval and Treat   Medical Diagnosis from Referral:   M17.31 (ICD-10-CM) - Post-traumatic osteoarthritis of right knee  M47.816 (ICD-10-CM) - Lumbar spondylosis  M79.7 (ICD-10-CM) - Fibromyalgia  Evaluation Date: 10/16/2019  Authorization Period Expiration: 11/29/2019  Plan of Care Expiration: 11/29/2019  Visit # / Visits authorized: 8/12  FOTO: 8    Time In: 9:30 am   Time Out: 10:02 am   Total Billable Time: 32 minutes    Precautions: Standard and HTN, hx of cancer    Subjective     Pt reports: that her knees were hurting, but back was feeling good prior to therapy session.   She was compliant with home exercise program.  Response to previous treatment: no adverse effects   Functional change: none    Pain: (B) knee pain, not subject to scale     Objective       Edita received therapeutic exercises to develop strength, ROM, flexibility, posture and core stabilization for 32 minutes including below. See note by Jessy Tijerina PT for remainder of session. SG= therex performed with Jessy Tijerina PT.     Nu step x 5' - SG  Bridges 3x10 - SG  Pelvic Tilting x 20 w/ kickouts - SG  Supine hamstring stretch 3x30" B - SG  SLR x20 3#  Ball squeezes 5"x20 - SG   hookyling hip abduction 3x10 OTB   SAQ 2x10 3# B  Seated scap retractions 3x10 3" hold  gastroc stretch 3x30"   Seated hamstring curls RTB 2x10 B        Home Exercises Provided and Patient Education Provided     Education provided:   - Continue with HEP     Written Home Exercises Provided: Patient instructed to cont prior HEP.  Exercises were reviewed and " Edita was able to demonstrate them prior to the end of the session.  Edita demonstrated good  understanding of the education provided.     See EMR under Patient Instructions for exercises provided initial evaluation.      Assessment     Pt was able to tolerate all therex including addition of seated hamstring curls without c/o increased pain. Pt reported feeling good and no increased pain at the end of therapy session. See note by Jessy Tijerina PT for remainder of session.   Edita is progressing well towards her goals.   Pt prognosis is Fair.     Pt will continue to benefit from skilled outpatient physical therapy to address the deficits listed in the problem list box on initial evaluation, provide pt/family education and to maximize pt's level of independence in the home and community environment.     Pt's spiritual, cultural and educational needs considered and pt agreeable to plan of care and goals.    Anticipated barriers to physical therapy: Poor historian and recall ability    Goals:     Short Term Goals (3 Weeks):  1. Pt will be compliant with HEP to supplement PT in decreasing pain with functional mobility.  2. Pt will improve lumbar ROM by 5-10 degrees in all planes to improve functional mobility.  3. Pt will improve impaired LE MMTs by 1/2 score to improve strength for functional tasks.  4. Pt will reduce pain to a 6/10 pain during all lumbar and LE rom  Long Term Goals (6 Weeks):   1. Pt will improve FOTO score to </= 65% limited to decrease perceived limitation with maintaining/changing body position.   2. Pt will perform pallof press with good control to demonstrate improved core strength.  3. Pt will improve impaired LE MMTs by 1 score to improve strength for functional tasks.  4. Pt will report no pain during lumbar and LE ROM to promote functional mobility.         Plan     Continue per POC, progress as tolerated    Charito Be, PT

## 2019-11-19 NOTE — PROGRESS NOTES
"  Physical Therapy Daily Treatment Note     Name: Edita Hardin [facility]  Clinic Number: [id number]    Therapy Diagnosis:   Encounter Diagnoses   Name Primary?    Bilateral low back pain with bilateral sciatica, unspecified chronicity     Acute pain of both knees     Leg weakness, bilateral     Decreased ROM of lumbar spine      Physician: Carlos Ramirez,*    Visit Date: 11/19/2019    Physician Orders: PT Eval and Treat   Medical Diagnosis from Referral:   M17.31 (ICD-10-CM) - Post-traumatic osteoarthritis of right knee  M47.816 (ICD-10-CM) - Lumbar spondylosis  M79.7 (ICD-10-CM) - Fibromyalgia  Evaluation Date: 10/16/2019  Authorization Period Expiration: 11/29/2019  Plan of Care Expiration: 11/29/2019  Visit # / Visits authorized: 8/12  FOTO: 8    Time In: 9:05 am  Time Out: 9:30 am  Total Billable Time: 15 minutes    Precautions: Standard and HTN, hx of cancer    Subjective     Pt reports: Her knees are not feeling good today but her back is doing better.   She was compliant with home exercise program.  Response to previous treatment: no adverse effects  Functional change: none    Pain: 9/10  Location: bilateral back      Objective       Edita received therapeutic exercises to develop strength, ROM, flexibility, posture and core stabilization for 25 minutes including:    *See note by Charito Be, PT*    Nu step x 5'   Bridges 3x10   Pelvic Tilting x 20 w/ kickouts  Supine hamstring stretch 3x30" B  SLR x20 3#- performed with charito  Ball squeezes 5"x20  hookyling hip abduction 3x10 OTB performed with charito  SAQ 2x10 3# B performed with charito  Seated scap retractions 3x10 3" hold performed with charito  gastroc stretch 3x30" performed with charito      Home Exercises Provided and Patient Education Provided     Education provided:   - Continue with HEP     Written Home Exercises Provided: Patient instructed to cont prior HEP.  Exercises were reviewed and Edita was able to demonstrate them " prior to the end of the session.  dEita demonstrated good  understanding of the education provided.     See EMR under Patient Instructions for exercises provided initial evaluation.      Assessment     Pt tolerated therex well today with no reports of increase pain.  Continue to progress as tolerated.   Edita is progressing well towards her goals.   Pt prognosis is Fair.     Pt will continue to benefit from skilled outpatient physical therapy to address the deficits listed in the problem list box on initial evaluation, provide pt/family education and to maximize pt's level of independence in the home and community environment.     Pt's spiritual, cultural and educational needs considered and pt agreeable to plan of care and goals.    Anticipated barriers to physical therapy: Poor historian and recall ability    Goals:     Short Term Goals (3 Weeks):  1. Pt will be compliant with HEP to supplement PT in decreasing pain with functional mobility.  2. Pt will improve lumbar ROM by 5-10 degrees in all planes to improve functional mobility.  3. Pt will improve impaired LE MMTs by 1/2 score to improve strength for functional tasks.  4. Pt will reduce pain to a 6/10 pain during all lumbar and LE rom  Long Term Goals (6 Weeks):   1. Pt will improve FOTO score to </= 65% limited to decrease perceived limitation with maintaining/changing body position.   2. Pt will perform pallof press with good control to demonstrate improved core strength.  3. Pt will improve impaired LE MMTs by 1 score to improve strength for functional tasks.  4. Pt will report no pain during lumbar and LE ROM to promote functional mobility.       Plan     Continue per POC, progress as tolerated    Jessy Tijerina, PT

## 2019-11-21 ENCOUNTER — CLINICAL SUPPORT (OUTPATIENT)
Dept: REHABILITATION | Facility: HOSPITAL | Age: 54
End: 2019-11-21
Attending: PHYSICAL MEDICINE & REHABILITATION
Payer: MEDICAID

## 2019-11-21 DIAGNOSIS — R29.898 LEG WEAKNESS, BILATERAL: ICD-10-CM

## 2019-11-21 DIAGNOSIS — M53.86 DECREASED ROM OF LUMBAR SPINE: ICD-10-CM

## 2019-11-21 DIAGNOSIS — M54.41 BILATERAL LOW BACK PAIN WITH BILATERAL SCIATICA, UNSPECIFIED CHRONICITY: ICD-10-CM

## 2019-11-21 DIAGNOSIS — M25.562 ACUTE PAIN OF BOTH KNEES: ICD-10-CM

## 2019-11-21 DIAGNOSIS — M54.42 BILATERAL LOW BACK PAIN WITH BILATERAL SCIATICA, UNSPECIFIED CHRONICITY: ICD-10-CM

## 2019-11-21 DIAGNOSIS — M25.561 ACUTE PAIN OF BOTH KNEES: ICD-10-CM

## 2019-11-21 PROCEDURE — 97110 THERAPEUTIC EXERCISES: CPT

## 2019-11-21 NOTE — PROGRESS NOTES
"  Physical Therapy Daily Treatment Note     Name: Edita Hardin [facility]  Clinic Number: [id number]    Therapy Diagnosis:   Encounter Diagnoses   Name Primary?    Bilateral low back pain with bilateral sciatica, unspecified chronicity     Acute pain of both knees     Leg weakness, bilateral     Decreased ROM of lumbar spine      Physician: Carlos Ramirez,*    Visit Date: 11/21/2019    Physician Orders: PT Eval and Treat   Medical Diagnosis from Referral:   M17.31 (ICD-10-CM) - Post-traumatic osteoarthritis of right knee  M47.816 (ICD-10-CM) - Lumbar spondylosis  M79.7 (ICD-10-CM) - Fibromyalgia  Evaluation Date: 10/16/2019  Authorization Period Expiration: 11/29/2019  Plan of Care Expiration: 11/29/2019  Visit # / Visits authorized: 9/12  FOTO: 9    Time In: 9:00 am   Time Out: 9:52 am   Total Billable Time: 52 minutes    Precautions: Standard and HTN, hx of cancer    Subjective     Pt reports: that her back and her knees are bothering her this morning.   She was compliant with home exercise program.  Response to previous treatment: no adverse effects   Functional change: none    Pain: (B) knee pain, not subject to scale     Objective       Edita received therapeutic exercises to develop strength, ROM, flexibility, posture and core stabilization for 52 minutes including below.     Nu step x 5'   Bridges 3x10  Pelvic Tilting x 20 w/ kickouts   Supine hamstring stretch 3x30" B   SLR x20 3#  Ball squeezes 5"x20 -   hookyling hip abduction 3x10 OTB   SAQ 3x10 3# B  Seated scap retractions 3x10 3" hold  gastroc stretch 3x30"   Seated hamstring curls RTB 2x10 B         Home Exercises Provided and Patient Education Provided     Education provided:   - Continue with HEP     Written Home Exercises Provided: Patient instructed to cont prior HEP.  Exercises were reviewed and Edita was able to demonstrate them prior to the end of the session.  Edita demonstrated good  understanding of the education " provided.     See EMR under Patient Instructions for exercises provided initial evaluation.      Assessment     Pt was able to tolerate all therex without c/o increased pain. Pt reported feeling good and no increased pain at the end of therapy session.   Edita is progressing well towards her goals.   Pt prognosis is Fair.     Pt will continue to benefit from skilled outpatient physical therapy to address the deficits listed in the problem list box on initial evaluation, provide pt/family education and to maximize pt's level of independence in the home and community environment.     Pt's spiritual, cultural and educational needs considered and pt agreeable to plan of care and goals.    Anticipated barriers to physical therapy: Poor historian and recall ability    Goals:     Short Term Goals (3 Weeks):  1. Pt will be compliant with HEP to supplement PT in decreasing pain with functional mobility.  2. Pt will improve lumbar ROM by 5-10 degrees in all planes to improve functional mobility.  3. Pt will improve impaired LE MMTs by 1/2 score to improve strength for functional tasks.  4. Pt will reduce pain to a 6/10 pain during all lumbar and LE rom  Long Term Goals (6 Weeks):   1. Pt will improve FOTO score to </= 65% limited to decrease perceived limitation with maintaining/changing body position.   2. Pt will perform pallof press with good control to demonstrate improved core strength.  3. Pt will improve impaired LE MMTs by 1 score to improve strength for functional tasks.  4. Pt will report no pain during lumbar and LE ROM to promote functional mobility.         Plan     Continue per POC, progress as tolerated    Jessy Tijerina, PT

## 2019-11-22 ENCOUNTER — TELEPHONE (OUTPATIENT)
Dept: GYNECOLOGIC ONCOLOGY | Facility: CLINIC | Age: 54
End: 2019-11-22

## 2019-11-22 NOTE — TELEPHONE ENCOUNTER
----- Message from Linda Varner RN sent at 11/22/2019  4:40 PM CST -----  Contact: EMMA SWARTZ [[id number]]  Please contact pt to get her scheduled.    Thanks  Linda  ----- Message -----  From: Del Bird, Patient Care Assistant  Sent: 11/22/2019   4:23 PM CST  To: [facility] JARA Staff    Name of Who is Calling: EMMA SWARTZ [[id number]]    What is the request in detail:Requesting a call back in regards of being scheduled for 11/27/19. Please contact to further discuss and advise      Can the clinic reply by [facility]: No    [facility]:   [phone]

## 2019-11-26 ENCOUNTER — HOSPITAL ENCOUNTER (OUTPATIENT)
Dept: RADIOLOGY | Facility: HOSPITAL | Age: 54
Discharge: HOME | End: 2019-11-26
Attending: OBSTETRICS & GYNECOLOGY
Payer: MEDICAID

## 2019-11-26 DIAGNOSIS — C77.9 METASTATIC SQUAMOUS CELL CARCINOMA TO LYMPH NODE: ICD-10-CM

## 2019-11-26 PROCEDURE — 74177 CT ABD & PELVIS W/CONTRAST: CPT | Mod: 26,,, | Performed by: RADIOLOGY

## 2019-11-26 PROCEDURE — 74177 CT ABD & PELVIS W/CONTRAST: CPT | Mod: TC

## 2019-11-26 PROCEDURE — 25500020 PHARM REV CODE 255: Performed by: OBSTETRICS & GYNECOLOGY

## 2019-11-26 PROCEDURE — 74177 CT ABDOMEN PELVIS WITH CONTRAST: ICD-10-PCS | Mod: 26,,, | Performed by: RADIOLOGY

## 2019-11-26 RX ADMIN — IOHEXOL 15 ML: 350 INJECTION, SOLUTION INTRAVENOUS at 06:11

## 2019-11-26 RX ADMIN — IOHEXOL 15 ML: 350 INJECTION, SOLUTION INTRAVENOUS at 05:11

## 2019-11-26 RX ADMIN — IOHEXOL 100 ML: 350 INJECTION, SOLUTION INTRAVENOUS at 07:11

## 2019-11-27 ENCOUNTER — OFFICE VISIT (OUTPATIENT)
Dept: GYNECOLOGIC ONCOLOGY | Facility: CLINIC | Age: 54
End: 2019-11-27
Payer: MEDICAID

## 2019-11-27 VITALS
HEART RATE: 90 BPM | HEIGHT: 69 IN | DIASTOLIC BLOOD PRESSURE: 70 MMHG | WEIGHT: 232.81 LBS | SYSTOLIC BLOOD PRESSURE: 141 MMHG | BODY MASS INDEX: 34.48 KG/M2

## 2019-11-27 DIAGNOSIS — C77.9 METASTATIC SQUAMOUS CELL CARCINOMA TO LYMPH NODE: Primary | ICD-10-CM

## 2019-11-27 DIAGNOSIS — G62.0 NEUROPATHY DUE TO CHEMOTHERAPEUTIC DRUG: ICD-10-CM

## 2019-11-27 DIAGNOSIS — T45.1X5A NEUROPATHY DUE TO CHEMOTHERAPEUTIC DRUG: ICD-10-CM

## 2019-11-27 DIAGNOSIS — Z85.41 HISTORY OF CERVICAL CANCER: ICD-10-CM

## 2019-11-27 PROCEDURE — 99999 PR PBB SHADOW E&M-EST. PATIENT-LVL III: CPT | Mod: PBBFAC,,, | Performed by: OBSTETRICS & GYNECOLOGY

## 2019-11-27 PROCEDURE — 99999 PR PBB SHADOW E&M-EST. PATIENT-LVL III: ICD-10-PCS | Mod: PBBFAC,,, | Performed by: OBSTETRICS & GYNECOLOGY

## 2019-11-27 PROCEDURE — 99213 OFFICE O/P EST LOW 20 MIN: CPT | Mod: PBBFAC | Performed by: OBSTETRICS & GYNECOLOGY

## 2019-11-27 PROCEDURE — 99214 OFFICE O/P EST MOD 30 MIN: CPT | Mod: S$PBB,,, | Performed by: OBSTETRICS & GYNECOLOGY

## 2019-11-27 PROCEDURE — 99214 PR OFFICE/OUTPT VISIT, EST, LEVL IV, 30-39 MIN: ICD-10-PCS | Mod: S$PBB,,, | Performed by: OBSTETRICS & GYNECOLOGY

## 2019-11-27 NOTE — PROGRESS NOTES
Subjective:       Patient ID: Edita Riley is a 54 y.o. female.    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  Denies vaginal bleeding.   No complaints.      2019: CT AP: STEVEN     Pap:  Aug 23, 2019: Normal  MM2019: normal   CBE: Aug 2019     Her oncologic history is:  Referring physician:  Dr. Sebas Reed  Reason for referral:  Incisional biopsy of retroperitoneal periaortic node that shows squamous cell carcinoma consistent with gynecologic primary        Aug 2018: chronic abdominal pain referred for consideration of biopsy of a 2 cm right common iliac artery bifurcation node  Node was evident on scan in  and again in  - no change  Patient's symptoms are non specific, diffuse, have not resulted in weight loss  Patient already carries a diagnosis of diffuse pain syndromes including fibromyalgia        Aug 2018: PET scan Right common iliac lymph node suspicious for malignancy.  This could be benign or malignant lymphoproliferative disorder metastatic disease.  This is an a risky position for percutaneous biopsy.      2018: Underwent retroperitoneal approach for incisional biopsy of right common iliac LN. Pathology showed:  Supplemental Diagnosis  METASTATIC HIGH-GRADE CARCINOMA GROWING WITH SQUAMOUS CELL FEATURES. THE RESULTS OF  ADDITIONAL IMMUNOSTAINS ARE AS FOLLOWS: CK5/6, P63 AND CK7 ARE POSITIVE. TTF, S100 AND  CK20 ARE ALL NEGATIVE. THE CONTROLS STAIN APPROPRIATELY.  NOTE: GIVEN THESE RESULTS THIS MAY REPRESENT A PRIMARY IN THE URINARY OR LOWER GYN  TRACTS. DR. JAMESON HAS ALSO REVIEWED THIS CASE AND CONCURS WITH THE DIAGNOSIS.           She has a history of hysterectomy for cervical cancer in 2015 in [place], [place]. Hyst was for abnormal pap. Incidental finding of cervical cancer on the hyst specimen. She does not know any other details.      2018: started concurrent  "chemoradiation.      Dec 2018: admitted with hypokalemia, hypomagnesemia and hypocalcemia. Seizure like activity. Worsening depression. Electrolyte replacement. Started on Abilify by psychiatry. S/p 4 cycles of cisplatin.      Jan 2019: completed "WPRT and 4 cycles of cisplatin. Received only 4 due to electrolyte abnormalities.      Feb 2019:PET:      Right common iliac lymph node, shows max SUV of 5.6 on today's exam versus 10.2 on prior exam.  A subcentimeter lymph node is seen in CT in this region.    The previously seen avid left axillary lymph node is no longer visualized is FDG avid.             May 2019: CT CAP: no evidence for disease. (CT done for insurance reason).  Previous imaging was a PET scan          Review of Systems   Constitutional: Positive for chills (chronic) and fever (chronic low grade). Negative for fatigue.   Respiratory: Negative for cough, shortness of breath and wheezing.    Cardiovascular: Negative for chest pain, palpitations and leg swelling.   Gastrointestinal: Negative for abdominal pain, constipation, diarrhea, nausea and vomiting.   Genitourinary: Negative for difficulty urinating, dysuria, frequency, genital sores, hematuria, urgency, vaginal bleeding, vaginal discharge and vaginal pain.   Musculoskeletal:        Fibromyalgia   Neurological: Positive for weakness and numbness (fingers. grade 1. ).   Hematological: Negative for adenopathy. Does not bruise/bleed easily.   Psychiatric/Behavioral: The patient is not nervous/anxious.        Objective:   BP (!) 141/70 (BP Location: Left arm, Patient Position: Sitting, BP Method: Large (Automatic))   Pulse 90   Ht 5' 9" (1.753 m)   Wt 105.6 kg (232 lb 12.9 oz)   LMP 06/08/2014 (Approximate)   BMI 34.38 kg/m²      Physical Exam   Constitutional: She is oriented to person, place, and time. She appears well-developed and well-nourished.   HENT:   Head: Normocephalic and atraumatic.   Eyes: No scleral icterus.   Neck: Neck supple. No " tracheal deviation present. No thyroid mass and no thyromegaly present.   Cardiovascular: Normal rate and regular rhythm.   Pulmonary/Chest: Effort normal and breath sounds normal. She has no wheezes.   Abdominal: Soft. She exhibits no distension and no mass. There is no hepatosplenomegaly. There is no tenderness. There is no rebound and no guarding.   Genitourinary:   Genitourinary Comments: Bimanual exam:  Vulva: no lesions. Normal appearance  Urethra: Normal size and location. No lesions  Bladder: No masses or tenderness.  Vagina: normal mucosa. No lesion  Cervix: absent.   Uterus: absent.  Adnexa: no masses.  Rectovaginal: Not performed     Musculoskeletal: She exhibits no edema or tenderness.   Lymphadenopathy:     She has no cervical adenopathy.     She has no axillary adenopathy.        Right: No inguinal and no supraclavicular adenopathy present.        Left: No inguinal and no supraclavicular adenopathy present.   Neurological: She is alert and oriented to person, place, and time.   Skin: Skin is warm and dry.   Psychiatric: She has a normal mood and affect. Her behavior is normal. Judgment and thought content normal.       Assessment:       1. 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 History of cervical cancer    3. 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     History of cervical cancer    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 drug  Stable. Continue gabapentin.

## 2020-01-03 ENCOUNTER — DOCUMENTATION ONLY (OUTPATIENT)
Dept: REHABILITATION | Facility: HOSPITAL | Age: 55
End: 2020-01-03

## 2020-01-03 PROBLEM — R29.898 LEG WEAKNESS, BILATERAL: Status: RESOLVED | Noted: 2019-10-16 | Resolved: 2020-01-03

## 2020-01-03 PROBLEM — M25.569 KNEE PAIN: Status: RESOLVED | Noted: 2019-10-16 | Resolved: 2020-01-03

## 2020-01-03 PROBLEM — M53.86 DECREASED ROM OF LUMBAR SPINE: Status: RESOLVED | Noted: 2019-10-16 | Resolved: 2020-01-03

## 2020-01-03 PROBLEM — M54.9 BACK PAIN: Status: RESOLVED | Noted: 2019-10-16 | Resolved: 2020-01-03

## 2020-01-03 NOTE — PROGRESS NOTES
Outpatient Therapy Discharge Summary     Name: Edita Hardin [facility]  Clinic Number: [id number]    Therapy Diagnosis:        Encounter Diagnoses   Name Primary?    Bilateral low back pain with bilateral sciatica, unspecified chronicity      Acute pain of both knees      Leg weakness, bilateral      Decreased ROM of lumbar spine        Physician: Carlos Ramirez,*     Visit Date: 11/21/2019     Physician Orders: PT Eval and Treat   Medical Diagnosis from Referral:   M17.31 (ICD-10-CM) - Post-traumatic osteoarthritis of right knee  M47.816 (ICD-10-CM) - Lumbar spondylosis  M79.7 (ICD-10-CM) - Fibromyalgia  Evaluation Date: 10/16/2019      Date of Last visit: 11/21/19  Total Visits Received: 9      Assessment    Goals: goals not re-assessed yet    Short Term Goals (3 Weeks):  1. Pt will be compliant with HEP to supplement PT in decreasing pain with functional mobility.  2. Pt will improve lumbar ROM by 5-10 degrees in all planes to improve functional mobility.  3. Pt will improve impaired LE MMTs by 1/2 score to improve strength for functional tasks.  4. Pt will reduce pain to a 6/10 pain during all lumbar and LE rom  Long Term Goals (6 Weeks):   1. Pt will improve FOTO score to </= 65% limited to decrease perceived limitation with maintaining/changing body position.   2. Pt will perform pallof press with good control to demonstrate improved core strength.  3. Pt will improve impaired LE MMTs by 1 score to improve strength for functional tasks.  4. Pt will report no pain during lumbar and LE ROM to promote functional mobility.     Discharge reason: Patient requested discharge    Plan   This patient is discharged from Physical Therapy

## 2020-01-15 ENCOUNTER — TELEPHONE (OUTPATIENT)
Dept: INTERNAL MEDICINE | Facility: CLINIC | Age: 55
End: 2020-01-15

## 2020-01-15 NOTE — TELEPHONE ENCOUNTER
----- Message from Rajeev Brambila sent at 1/15/2020  3:33 PM CST -----  Contact: Lisette [facility] ([facility] [phone]  Lisette states she need some back ground information on pt in regards to living beyond pain program, please advise.

## 2020-01-17 ENCOUNTER — TELEPHONE (OUTPATIENT)
Dept: INTERNAL MEDICINE | Facility: CLINIC | Age: 55
End: 2020-01-17

## 2020-01-17 NOTE — TELEPHONE ENCOUNTER
Spoke with case cl   States pt is requesting  A referral to pain management  Also wanting to get her ssi   Explained we do not file that type of paper work she would need to see someone else  Also she should set up an appointment for f\u in clinic

## 2020-01-17 NOTE — TELEPHONE ENCOUNTER
----- Message from Shruthi Hyde sent at 1/16/2020 11:46 AM CST -----  [phone]  Request for call back from Britt    Please call and advise  Thank you

## 2020-02-05 ENCOUNTER — TELEPHONE (OUTPATIENT)
Dept: INTERNAL MEDICINE | Facility: CLINIC | Age: 55
End: 2020-02-05

## 2020-02-05 NOTE — TELEPHONE ENCOUNTER
Omeprazole refill placed.  1:32   2/6/20  LR    ----- Message from Lisa Gutiérrez sent at 2/5/2020 10:25 AM CST -----  Contact: self   Patient state she need a refill for her stomach medicine. Patient do not know the name of the medication. The patient pharmacy is [facility]/pharm[facility] #[id number] - [place], [place] - [address] [phone] (Phone)  [phone] (Fax). Please advise.

## 2020-02-06 DIAGNOSIS — K21.00 GASTROESOPHAGEAL REFLUX DISEASE WITH ESOPHAGITIS: ICD-10-CM

## 2020-02-06 RX ORDER — OMEPRAZOLE 40 MG/1
40 CAPSULE, DELAYED RELEASE ORAL DAILY
Qty: 90 CAPSULE | Refills: 3 | Status: SHIPPED | OUTPATIENT
Start: 2020-02-06 | End: 2020-02-07 | Stop reason: SDUPTHER

## 2020-02-07 DIAGNOSIS — K21.00 GASTROESOPHAGEAL REFLUX DISEASE WITH ESOPHAGITIS: ICD-10-CM

## 2020-02-07 RX ORDER — OMEPRAZOLE 40 MG/1
40 CAPSULE, DELAYED RELEASE ORAL DAILY
Qty: 90 CAPSULE | Refills: 3 | Status: SHIPPED | OUTPATIENT
Start: 2020-02-07 | End: 2020-07-10

## 2020-02-07 NOTE — TELEPHONE ENCOUNTER
----- Message from Sofia S Wale sent at 2/7/2020 11:24 AM CST -----  Contact: Pt self Mobile [phone]  Patient is calling for an RX refill or new RX.  Is this a refill or new RX:  Refill  RX name and strength: omeprazole (PRILOSEC) 40 MG capsule  Directions (copy/paste from chart):  N/A  Is this a 30 day or 90 day RX:  90  Local pharmacy or mail order pharmacy:  [facility]/pharmacy #[id number] [facility] [place] [address] [address]  Pharmacy name and phone # [phone], fax# [phone]

## 2020-02-18 ENCOUNTER — OFFICE VISIT (OUTPATIENT)
Dept: RADIATION ONCOLOGY | Facility: CLINIC | Age: 55
End: 2020-02-18
Payer: MEDICAID

## 2020-02-18 VITALS
DIASTOLIC BLOOD PRESSURE: 73 MMHG | TEMPERATURE: 98 F | HEIGHT: 69 IN | SYSTOLIC BLOOD PRESSURE: 129 MMHG | BODY MASS INDEX: 35.28 KG/M2 | WEIGHT: 238.19 LBS | HEART RATE: 78 BPM | RESPIRATION RATE: 16 BRPM

## 2020-02-18 DIAGNOSIS — Z85.41 HISTORY OF CERVICAL CANCER: ICD-10-CM

## 2020-02-18 DIAGNOSIS — C77.9 METASTATIC SQUAMOUS CELL CARCINOMA TO LYMPH NODE: Primary | ICD-10-CM

## 2020-02-18 PROCEDURE — 99999 PR PBB SHADOW E&M-EST. PATIENT-LVL III: CPT | Mod: PBBFAC,,, | Performed by: RADIOLOGY

## 2020-02-18 PROCEDURE — 99213 OFFICE O/P EST LOW 20 MIN: CPT | Mod: S$PBB,,, | Performed by: RADIOLOGY

## 2020-02-18 PROCEDURE — 99213 PR OFFICE/OUTPT VISIT, EST, LEVL III, 20-29 MIN: ICD-10-PCS | Mod: S$PBB,,, | Performed by: RADIOLOGY

## 2020-02-18 PROCEDURE — 99999 PR PBB SHADOW E&M-EST. PATIENT-LVL III: ICD-10-PCS | Mod: PBBFAC,,, | Performed by: RADIOLOGY

## 2020-02-18 PROCEDURE — 99213 OFFICE O/P EST LOW 20 MIN: CPT | Mod: PBBFAC | Performed by: RADIOLOGY

## 2020-02-18 NOTE — PROGRESS NOTES
REFERRING PHYSICIAN: Sebas Reed MD, Refugio Lemon M.D.     DIAGNOSIS: History of cervical cancer with pelvic lymph node recurrence      Radiation Treatment Summary:  Ms. Riley completed radiation to the pelvis and periaortic lymph nodes concurrent with chemotherapy as shown below.     Treatment Site Energy Dose/Fx (Gy) #Fx Total Dose (Gy) Start Date End Date   PELVIS 6X 1.8 25 / 25 45 11/7/2018 12/17/2018   Right LN BOOST 6X 1.8 8 / 8 14.4 12/18/2018 1/2/2019   Vaginal cuff/ upper vagina HDR 5 3/3 15 12/19/2018 1/3/2019      INTERVAL HISTORY:   Ms. Riley is a 55-year-old female with history fibromyalgia, major depressive disorder, stroke at the age of 7, and cervical cancer status post hysterectomy in 2015 in [place], [place] who was recently found to have a pelvic lymph node recurrence after evaluation for generalized malaise and abdominal pain. Workup with CT of the abdomen and pelvis on July 24, 2018 revealed an enlarged lymph node at the bifurcation of the right common iliac measuring 2 cm. There was also mild enlargement of the liver with hypoattenuation suggestive of steatosis. A PET scan on August 31, 2018 revealed hypermetabolic activity associated with the right iliac lymph node with SUV max of 10. There are low-grade axillary and inguinal lymph nodes noted with SUV max less than 2. There is no signs of metastatic disease. On is September 18, 2018, she underwent incisional biopsy of this lesion with pathology revealing metastatic high-grade carcinoma with squamous cell features most likely GYN origin. A Pap smear on October 12, 2018 is negative for malignancy. She also underwent evaluation by Urology which was negative for any urological origin. She received definitive radiation to the pelvis followed by a boost to the right iliac region concurrent with chemotherapy as above.  She is here today for a routine follow-up.     Since completion of radiation, she underwent a PET scan on February 13,  "2019 which reveals decrease in the uptake involving the right common iliac lymph node down to 5.6 compared to 10.2 previously.  The previously seen left axillary lymph node is no longer visualized. Since I saw her last, she underwent a repeat CT of the chest, abdomen, and pelvis on May 20, 2019 which revealed no abnormal pelvic masses to suggest recurrence or residual malignancy.  The previously demonstrated lymph node at the level of the right common iliac bifurcation is no longer seen.  No new enlarging lymph nodes are noted.  At present, she reports occasional loose stools which was present prior to her treatment.  She denies nausea, vomiting, vaginal bleeding, vaginal discharge, dysuria, hematuria, fever, night sweats, or weight loss.    Since I saw her last, she underwent a CT of the abdomen and pelvis with contrast on November 26, 2019 which reveals no evidence of recurrence.  She is scheduled to follow-up with Dr. Lemon on March 4, 2020.    PHYSICAL EXAMINATION:  VITAL SINGS: /73   Pulse 78   Temp 98.1 °F (36.7 °C) (Oral)   Resp 16   Ht 5' 9" (1.753 m)   Wt 108 kg (238 lb 3.2 oz)   LMP 06/08/2014 (Approximate)   BMI 35.18 kg/m²   GENERAL: Patient is alert and oriented, in no acute distress.  HEENT: Extraocular muscles are intact.  Oropharynx is clear without lesions.  There is no cervical or supraclavicular adenopathy palpated.    CHEST: Breath sounds clear bilaterally.  No rales.  No rhonchi.  Unlabored respirations.  CARDIOVASCULAR: Normal S1, S2.  Normal rate.  Regular rhythm.  ABDOMEN: Bowel sounds normal.  No tenderness.  No abdominal distention.  No hepatomegaly.  No splenomegaly.  PELVIC EXAM:  A speculum examination reveals intact vaginal cuff with no suspicious lesions.  EXTREMITIES: No clubbing, cyanosis, edema  NEUROLOGIC: Cranial nerves II through XII are grossly intact.  Sensation is intact.  Strength is 5 out of 5 in the upper and lower extremities bilaterally.    ASSESSMENT:   This " is a 55-year-old female with history of cervical cancer in 2015 who underwent hysterectomy at an outside facility who was recently found to have pelvic lymphadenopathy with incisional biopsy on September 18, 2018 revealing high-grade carcinoma with squamous cell features, for which she completed concurrent chemotherapy and pelvic and vaginal cuff irradiation.     PLAN:   Ms. Riley is doing well from the standpoint of cervical cancer.  Her recent CT in November 2019 is without evidence of recurrent disease. She will continue follow-up with Dr. Lemon on March 4, 2020 as planned. I plan to see her back as needed, unless symptoms warrant otherwise.

## 2020-02-21 ENCOUNTER — OFFICE VISIT (OUTPATIENT)
Dept: PHYSICAL MEDICINE AND REHAB | Facility: CLINIC | Age: 55
End: 2020-02-21
Payer: MEDICAID

## 2020-02-21 VITALS
HEIGHT: 69 IN | DIASTOLIC BLOOD PRESSURE: 75 MMHG | HEART RATE: 92 BPM | BODY MASS INDEX: 35.25 KG/M2 | WEIGHT: 238 LBS | SYSTOLIC BLOOD PRESSURE: 110 MMHG

## 2020-02-21 DIAGNOSIS — M79.7 FIBROMYALGIA: Primary | ICD-10-CM

## 2020-02-21 DIAGNOSIS — M17.0 PRIMARY OSTEOARTHRITIS OF BOTH KNEES: ICD-10-CM

## 2020-02-21 PROCEDURE — 99999 PR PBB SHADOW E&M-EST. PATIENT-LVL III: ICD-10-PCS | Mod: PBBFAC,,, | Performed by: PHYSICAL MEDICINE & REHABILITATION

## 2020-02-21 PROCEDURE — 99213 OFFICE O/P EST LOW 20 MIN: CPT | Mod: PBBFAC | Performed by: PHYSICAL MEDICINE & REHABILITATION

## 2020-02-21 PROCEDURE — 99999 PR PBB SHADOW E&M-EST. PATIENT-LVL III: CPT | Mod: PBBFAC,,, | Performed by: PHYSICAL MEDICINE & REHABILITATION

## 2020-02-21 PROCEDURE — 99214 PR OFFICE/OUTPT VISIT, EST, LEVL IV, 30-39 MIN: ICD-10-PCS | Mod: S$PBB,,, | Performed by: PHYSICAL MEDICINE & REHABILITATION

## 2020-02-21 PROCEDURE — 99214 OFFICE O/P EST MOD 30 MIN: CPT | Mod: S$PBB,,, | Performed by: PHYSICAL MEDICINE & REHABILITATION

## 2020-02-21 NOTE — PROGRESS NOTES
[facility] Follow up Visit Note    HPI: Edita Riley is a 55 y.o. female who presents for follow up of diffuse myofascial pain.    Interval History (2/21/2020):  At last visit, RX'd PT, XR of the knees, and discussed lifestyle changes.  She was progressing well in PT per notes, and self-discharged due to having to care for her  who was diagnosed with stage 3 colon cancer.  XR showed mild DJD, and neoprene knee sleeve was recommended.  Since last visit, symptoms are unchanged because of this.  She hasn't yet started the diet we discussed because of this as well.  Low back pain is non-radicular.  Knee pain is diffuse bilaterally.  As below, pain is roughly the same everywhere and is diffuse.        Recall (9/25/19):   Edita Riley is a 54 y.o. female who presents for evaluation of diffuse musculoskeletal pain (low back, right leg, flanks, right hand, shoulders), referred by Dr. Refugio Benjamin.     Pain is located at low back, right leg, flanks, right hand, shoulders without radiation.   No one pain worse than another.    It has been present for several years, and began without inciting injury.  The pain is described as punching, sharp    It is aggravated by moving around  It is alleviated by rest  She does get right hand swelling, and feet swelling      (+) numbness/tingling- in right hand  (+) weakness- right knee  (--) bowel/bladder incontinence  (--) recent fevers  (+) poor sleep  (+) stress by the chronic pain     Therapeutic interventions thus far:  Medications: flexeril (helped a little), gabapentin (does help), percocet (doesn't help), tramadol (doesn't help), cymbalta (does help)  Lifestyle changes: eats poor diet  Bracing/Modalities: heat (does help some)  Therapy: PT (did aquatherapy last year)  Injections: none  Surgery: none    PMH:   Past Medical History:   Diagnosis Date    Anxiety     Cervical cancer 2014    Chronic back pain     Depression     Diarrhea due to  malabsorption 11/14/2018    Fibromyalgia     GERD (gastroesophageal reflux disease)     Hiatal hernia 2014    History of cervical cancer 10/11/2018    Hx of psychiatric care     Cymbalta, trazodone    Hypertension     Hypomagnesemia 11/21/2018    Lactose intolerance     Metastatic squamous cell carcinoma to lymph node 10/11/2018    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 drug 11/14/2018    Osteoarthritis of back     Psychiatric problem     Stroke 1972                 Past Surgical History:   Procedure Laterality Date    BILATERAL OOPHORECTOMY  2015    CHOLECYSTECTOMY  11/09/2016    Done at [facility], path showed chronic cholecystitis and gallstones    cold knife conization  2014    COLONOSCOPY  2014    COLONOSCOPY N/A 10/24/2016    at [facility], Dr Gutiérrez    COLONOSCOPY N/A 5/18/2018    Procedure: COLONOSCOPY;  Surgeon: Arden Gutiérrez MD;  [facility] (4TH FLR);  Service: Endoscopy;  Laterality: N/A;    ESOPHAGOGASTRODUODENOSCOPY  2014    HYSTERECTOMY  2014    [facility] for cervical [facility]    OOPHORECTOMY      RETROPERITONEAL LYMPHADENECTOMY Right 9/17/2018    Procedure: LYMPHADENECTOMY, RETROPERITONEUM;  Surgeon: Sebas Reed MD;  [facility] OR [facility] FLR;  Service: General;  Laterality: Right;  ILIAC       Family Hx:   Family History   Problem Relation Age of Onset    Heart disease Sister     Heart disease Maternal Grandmother     Colon cancer Father     Esophageal cancer Father     Cancer Father         Lung-smoker    Cancer Mother         Cervical    Cervical cancer Mother     Breast cancer Maternal Aunt     Suicide Daughter         jumped from parking structure    Drug abuse Daughter     Drug abuse Daughter     Rectal cancer Neg Hx     Stomach cancer Neg Hx     Crohn's disease Neg Hx     Ulcerative colitis Neg Hx     Diabetes Neg Hx     Hypertension Neg Hx         Social History:   Social History     Socioeconomic History    Marital status:      Spouse name:  Hammad    Number of children: 2    Years of education: Not on file    Highest education level: Not on file   Occupational History    Not on file   Social Needs    Financial resource strain: Not on file    Food insecurity:     Worry: Not on file     Inability: Not on file    Transportation needs:     Medical: Not on file     Non-medical: Not on file   Tobacco Use    Smoking status: Former Smoker    Smokeless tobacco: Never Used   Substance and Sexual Activity    Alcohol use: No     Alcohol/week: 0.0 standard drinks    Drug use: No    Sexual activity: Yes     Partners: Male     Birth control/protection: None     Comment:  19 years since    Lifestyle    Physical activity:     Days per week: Not on file     Minutes per session: Not on file    Stress: Not on file   Relationships    Social connections:     Talks on phone: Not on file     Gets together: Not on file     Attends [employer] service: Not on file     Active member of club or organization: Not on file     Attends meetings of clubs or organizations: Not on file     Relationship status: Not on file   Other Topics Concern    Patient feels they ought to cut down on drinking/drug use Not Asked    Patient annoyed by others criticizing their drinking/drug use Not Asked    Patient has felt bad or guilty about drinking/drug use Not Asked    Patient has had a drink/used drugs as an eye opener in the AM Not Asked   Social History Narrative    , twin daughters (1  2018), disabled due to childhood stroke, Scientologist sophia       Allergies:   Review of patient's allergies indicates:   Allergen Reactions    Bee sting [allergen ext-venom-honey bee]      Rash      Grass pollen-bermuda, standard      rash       Current Meds:   Current Outpatient Medications   Medication Sig Dispense Refill    cholecalciferol, vitamin D3, 2,000 unit Cap Take 1 capsule by mouth once daily.      cyclobenzaprine (FLEXERIL) 10 MG tablet TAKE  "1 TABLET (10 MG TOTAL) BY MOUTH NIGHTLY AS NEEDED FOR MUSCLE SPASMS.  1    DULoxetine (CYMBALTA) 60 MG capsule Take 1 capsule (60 mg total) by mouth once daily. 90 capsule 3    gabapentin (NEURONTIN) 300 MG capsule Take 1 capsule (300 mg total) by mouth 3 (three) times daily. 270 capsule 3    lisinopril 10 MG tablet Take 1 tablet (10 mg total) by mouth once daily. 90 tablet 3    magnesium oxide (MAG-OX) 400 mg (241.3 mg magnesium) tablet Take 1 tablet (400 mg total) by mouth 3 (three) times daily. 90 tablet 1    meclizine (ANTIVERT) 25 mg tablet Take 1 tablet (25 mg total) by mouth 3 (three) times daily as needed for Dizziness. 30 tablet 6    omeprazole (PRILOSEC) 40 MG capsule Take 1 capsule (40 mg total) by mouth once daily. 90 capsule 3    oxyCODONE-acetaminophen (PERCOCET) 5-325 mg per tablet Take 1 tablet by mouth every 4 (four) hours as needed. 28 tablet 0    potassium chloride SA (K-DUR,KLOR-CON) 20 MEQ tablet Take 20 mEq by mouth once daily.  1    traMADol (ULTRAM) 50 mg tablet Take 50 mg by mouth every 6 (six) hours as needed for Pain.      traZODone (DESYREL) 50 MG tablet TAKE 1 TABLET (50 MG TOTAL) BY MOUTH EVERY EVENING. 30 tablet 11     No current facility-administered medications for this visit.        Review of Systems:  11 Point ROS (constitutional,HEENT,Resp,CV,GI,,Skin,Endo/Heme/Allergy,Psych,Neuro,MSK) negative aside from what is mentioned in HPI above.      Physical Exam:  /75   Pulse 92   Ht 5' 9" (1.753 m)   Wt 108 kg (238 lb)   LMP 06/08/2014 (Approximate)   BMI 35.15 kg/m²   General/Constitutional: Awake, in no acute distress  Psych: Normal affect  CV: Warm, well perfused extremities  Resp: Normal rate, unlabored breathing  Skin: No erythema or rash on exposed skin  Lymph: No lymphedema  Lumbar Spine Exam:  Inspection: Pelvis is asymmetric,  Lumbar lordotic curvature is normal  Palpation: There was tenderness to palpation diffusely along lumbar paraspinals, thoracic " paraspinals, socorro-scapular muscles, traps, hamstrings, and thighs- no triggering, only tenderpoints  ROM: ROM was Slightly decreased.  There was no significant pain with ROM.    Provocative tests:   (--) SLR- pain in low back without radicular symptoms  Neurovascular test:  Feet are warm and well perfused.    Reflexes are normal and symmetric in the lower extremities  Motor exam showed 5/5 strength in the bilateral hip flexors, knee extensors, ankle dorsiflexors, ankle plantarflexors, and toe extensors.    Bilateral Knee Exam:  Inspection: No gross abnormalities on exam.  No joint warmth or erythema.    Palpation: There was tenderness to palpation diffusely.(+) crepitus  ROM: ROM was Normal.  There was not pain with ROM.  Provocative tests:   (--) Lachmans- no pain, but there is mild laxity bilaterally  (--) McMurrays    Diagnostics:  XR L spine (7/19/2018): Vertebral bodies are normal in height without evidence of fracture.  Normal sagittal alignment is preserved.  No spondylolisthesis. No abnormal translation with flexion and extension.  Subtle endplate sclerosis at a few levels.  Intervertebral disc heights are well maintained.  Minimal facet arthropathy.  XR knees standing (9/25/19): No significant joint space narrowing.  No fracture or dislocation.  No bone destruction identified.  Mild DJD.       Impression: Edita Riley is a 55 y.o. female who presents with:  1. Diffuse musculoskeletal pain- myofascial in nature, suspect fibromyalgia.  Affects arms and legs bilaterally, and axially   2. Right knee pain- history of remote trauma, exam with laxity of ACL.  Also with left knee pain, but less so. XR with mild DJD in both knees.    3. Low back pain- mild spondylosis on imaging from 2018.  I suspect that most of this is biomechanical and myofascial, related to #1 above  4. Carpal tunnel syndrome right     Note: has not made any progress as after our last visit, her  was diagnosed with colon  cancer and so she has been caring for him.      Plan:   - Continue Home exercise program for now- once her  is stable and she is able to return to PT, I instructed her to let me know and I will place a new order for PT again.    - Rx neoprene knee sleeves- ordered again today  - Continue with wrist splint for CTS  - As per last note, pharmcotherapy wise, I don't think there is much to add.  She takes Cymbalta which can help both with the fibromyalgia and knee arthritis.  She also takes gabapentin 300mg TID.  She is also currently taking magensium, which can help with sleep and muscle tightness.  Will try to avoid PO NSAID given her GI history.    - Discussed again today healthy lifestyle modifications- anti-inflammatory and gluten free diet.  Difficult now with having to care for her , but she is aware and had no specific questions today on this.    - Follow up with me PRLESLIE Ramirez, DO  Physical Medicine and Rehabilitation

## 2020-03-03 ENCOUNTER — TELEPHONE (OUTPATIENT)
Dept: ADMINISTRATIVE | Facility: OTHER | Age: 55
End: 2020-03-03

## 2020-03-03 NOTE — PROGRESS NOTES
Subjective:       Patient ID: Edita Riley is a 55 y.o. female.    Chief Complaint: Follow-up (3mos fu)    HPI     Patient comes in for follow up for recurrent SCCA of the cervix.  Denies vaginal bleeding.   No complaints.       2019: CT AP: STEVEN     Pap:  Aug 23, 2019: Normal  MM2019: normal   CBE: Aug 2019     Her oncologic history is:  Referring physician:  Dr. Sebas Reed  Reason for referral:  Incisional biopsy of retroperitoneal periaortic node that shows squamous cell carcinoma consistent with gynecologic primary        Aug 2018: chronic abdominal pain referred for consideration of biopsy of a 2 cm right common iliac artery bifurcation node  Node was evident on scan in  and again in  - no change  Patient's symptoms are non specific, diffuse, have not resulted in weight loss  Patient already carries a diagnosis of diffuse pain syndromes including fibromyalgia        Aug 2018: PET scan Right common iliac lymph node suspicious for malignancy.  This could be benign or malignant lymphoproliferative disorder metastatic disease.  This is an a risky position for percutaneous biopsy.      2018: Underwent retroperitoneal approach for incisional biopsy of right common iliac LN. Pathology showed:  Supplemental Diagnosis  METASTATIC HIGH-GRADE CARCINOMA GROWING WITH SQUAMOUS CELL FEATURES. THE RESULTS OF  ADDITIONAL IMMUNOSTAINS ARE AS FOLLOWS: CK5/6, P63 AND CK7 ARE POSITIVE. TTF, S100 AND  CK20 ARE ALL NEGATIVE. THE CONTROLS STAIN APPROPRIATELY.  NOTE: GIVEN THESE RESULTS THIS MAY REPRESENT A PRIMARY IN THE URINARY OR LOWER GYN  TRACTS. DR. JAMESON HAS ALSO REVIEWED THIS CASE AND CONCURS WITH THE DIAGNOSIS.           She has a history of hysterectomy for cervical cancer in 2015 in [place], [place]. Hyst was for abnormal pap. Incidental finding of cervical cancer on the hyst specimen. She does not know any other details.      2018: started concurrent chemoradiation.      Dec 2018:  "admitted with hypokalemia, hypomagnesemia and hypocalcemia. Seizure like activity. Worsening depression. Electrolyte replacement. Started on Abilify by psychiatry. S/p 4 cycles of cisplatin.      Jan 2019: completed "WPRT and 4 cycles of cisplatin. Received only 4 due to electrolyte abnormalities.      Feb 2019:PET: Right common iliac lymph node, shows max SUV of 5.6 on today's exam versus 10.2 on prior exam.  A subcentimeter lymph node is seen in CT in this region.    The previously seen avid left axillary lymph node is no longer visualized is FDG avid.             May 2019: CT CAP: no evidence for disease. (CT done for insurance reason).  Previous imaging was a PET scan             Review of Systems   Constitutional: Negative for chills, fatigue and fever.   Respiratory: Negative for cough, shortness of breath and wheezing.    Cardiovascular: Negative for chest pain, palpitations and leg swelling.   Gastrointestinal: Negative for abdominal pain, constipation, diarrhea, nausea and vomiting.   Genitourinary: Negative for difficulty urinating, dysuria, frequency, genital sores, hematuria, urgency, vaginal bleeding, vaginal discharge and vaginal pain.   Neurological: Negative for weakness.   Hematological: Negative for adenopathy. Does not bruise/bleed easily.   Psychiatric/Behavioral: The patient is not nervous/anxious.        Objective:   /65 (BP Location: Left arm, Patient Position: Sitting, BP Method: Large (Automatic))   Pulse 89   Ht 5' 9" (1.753 m)   Wt 106.6 kg (235 lb 0.2 oz)   LMP 06/08/2014 (Approximate)   BMI 34.71 kg/m²      Physical Exam   Constitutional: She is oriented to person, place, and time. She appears well-developed and well-nourished.   HENT:   Head: Normocephalic and atraumatic.   Eyes: No scleral icterus.   Neck: Neck supple. No tracheal deviation present. No thyroid mass and no thyromegaly present.   Cardiovascular: Normal rate and regular rhythm.   Pulmonary/Chest: Effort normal " and breath sounds normal. She has no wheezes.   Abdominal: Soft. She exhibits no distension and no mass. There is no hepatosplenomegaly. There is no tenderness. There is no rebound and no guarding.   Genitourinary:   Genitourinary Comments: Bimanual exam:  Vulva: no lesions. Normal appearance  Urethra: Normal size and location. No lesions  Bladder: No masses or tenderness.  Vagina: normal mucosa. No lesion  Cervix: absent.   Uterus: absent.  Adnexa: no masses.  Rectovaginal: Not performed     Musculoskeletal: She exhibits no edema or tenderness.   Lymphadenopathy:     She has no cervical adenopathy.     She has no axillary adenopathy.        Right: No inguinal and no supraclavicular adenopathy present.        Left: No inguinal and no supraclavicular adenopathy present.   Neurological: She is alert and oriented to person, place, and time.   Skin: Skin is warm and dry.   Psychiatric: She has a normal mood and affect. Her behavior is normal. Judgment and thought content normal.       Assessment:       1. History of cervical cancer    2. Metastatic squamous cell carcinoma to lymph node        Plan:   History of cervical cancer    Metastatic squamous cell carcinoma to lymph node       STEVEN    RTC in 3 months.

## 2020-03-04 ENCOUNTER — OFFICE VISIT (OUTPATIENT)
Dept: GYNECOLOGIC ONCOLOGY | Facility: CLINIC | Age: 55
End: 2020-03-04
Payer: MEDICAID

## 2020-03-04 ENCOUNTER — TELEPHONE (OUTPATIENT)
Dept: INTERNAL MEDICINE | Facility: CLINIC | Age: 55
End: 2020-03-04

## 2020-03-04 VITALS
DIASTOLIC BLOOD PRESSURE: 65 MMHG | SYSTOLIC BLOOD PRESSURE: 115 MMHG | BODY MASS INDEX: 34.8 KG/M2 | HEART RATE: 89 BPM | HEIGHT: 69 IN | WEIGHT: 235 LBS

## 2020-03-04 DIAGNOSIS — Z85.41 HISTORY OF CERVICAL CANCER: Primary | ICD-10-CM

## 2020-03-04 DIAGNOSIS — C77.9 METASTATIC SQUAMOUS CELL CARCINOMA TO LYMPH NODE: ICD-10-CM

## 2020-03-04 PROCEDURE — 99214 PR OFFICE/OUTPT VISIT, EST, LEVL IV, 30-39 MIN: ICD-10-PCS | Mod: S$PBB,,, | Performed by: OBSTETRICS & GYNECOLOGY

## 2020-03-04 PROCEDURE — 99214 OFFICE O/P EST MOD 30 MIN: CPT | Mod: S$PBB,,, | Performed by: OBSTETRICS & GYNECOLOGY

## 2020-03-04 PROCEDURE — 99213 OFFICE O/P EST LOW 20 MIN: CPT | Mod: PBBFAC | Performed by: OBSTETRICS & GYNECOLOGY

## 2020-03-04 PROCEDURE — 99999 PR PBB SHADOW E&M-EST. PATIENT-LVL III: CPT | Mod: PBBFAC,,, | Performed by: OBSTETRICS & GYNECOLOGY

## 2020-03-04 PROCEDURE — 99999 PR PBB SHADOW E&M-EST. PATIENT-LVL III: ICD-10-PCS | Mod: PBBFAC,,, | Performed by: OBSTETRICS & GYNECOLOGY

## 2020-03-04 NOTE — LETTER
Josee Paulson is a 35year old female [id number] Patient's last menstrual period was 01/03/2019. Patient presents with:  Gyn Problem: LEFT BREAST PAIN     Last seen 9/26/17. Having left breast discomfort since 11/2018.   Achy discomfort along lateral surendra March 4, 2020      Audrey Mustafa MD  [address]  [facility] [address]           [facility] - GYN Oncology  [address]  [facility] [address]  Phone: [phone]          Patient: Edita Riley   MR Number: [id number]   YOB: 1965   Date of Visit: 3/4/2020       Dear Dr. Audrey Mustafa:    Thank you for referring Edita Riley to me for evaluation. Attached you will find relevant portions of my assessment and plan of care.    If you have questions, please do not hesitate to call me. I look forward to following Edita Riley along with you.    Sincerely,    Refugio Lemon MD    Enclosure  CC:  No Recipients    If you would like to receive this communication electronically, please contact [email or web address] or [phone] to request more information on [facility] [employer] access.    For providers and/or their staff who would like to refer a patient to [facility], please contact us through our one-stop-shop provider referral line, [facility], at [phone].    If you feel you have received this communication in error or would no longer like to receive these types of communications, please e-mail [email or web address]          Cap Take by mouth. Disp:  Rfl:    Shira, Zingiber officinalis, (SHIRA ROOT) 550 MG Oral Cap Take by mouth. Disp:  Rfl:    Sertraline HCl 100 MG Oral Tab Take 1 tablet (100 mg total) by mouth daily.  Disp: 90 tablet Rfl: 1   Insulin Pen Needle (BD PEN NEED month.       Requested Prescriptions      No prescriptions requested or ordered in this encounter

## 2020-03-04 NOTE — TELEPHONE ENCOUNTER
Insurance will not covered  For her Prilosec   Would please change it   She will  her mri from [place] ,

## 2020-03-06 ENCOUNTER — TELEPHONE (OUTPATIENT)
Dept: PHYSICAL MEDICINE AND REHAB | Facility: CLINIC | Age: 55
End: 2020-03-06

## 2020-03-06 NOTE — TELEPHONE ENCOUNTER
Returned pt call no answer lft msg to advise pt our office will not send over a DILCIA to [facility] because Dr. Ramirez is not requesting any MRI records.     ----- Message from Lopez Pollock sent at 3/6/2020 10:33 AM CST -----  Contact: pt @ [phone]  Pt states [facility] is needing paperwork w/ name/ so they can release MRI results the doctor is requesting. Pls fax to .

## 2020-03-11 ENCOUNTER — TELEPHONE (OUTPATIENT)
Dept: NEUROLOGY | Facility: CLINIC | Age: 55
End: 2020-03-11

## 2020-03-11 NOTE — TELEPHONE ENCOUNTER
----- Message from Sanford Barajas sent at 3/11/2020 12:42 PM CDT -----  Contact: Patient @ [phone]  Patient returning a missed call, pls return call

## 2020-03-12 ENCOUNTER — HOSPITAL ENCOUNTER (EMERGENCY)
Facility: HOSPITAL | Age: 55
Discharge: HOME | End: 2020-03-12
Attending: EMERGENCY MEDICINE
Payer: MEDICAID

## 2020-03-12 ENCOUNTER — TELEPHONE (OUTPATIENT)
Dept: PHYSICAL MEDICINE AND REHAB | Facility: CLINIC | Age: 55
End: 2020-03-12

## 2020-03-12 VITALS
WEIGHT: 235 LBS | DIASTOLIC BLOOD PRESSURE: 90 MMHG | TEMPERATURE: 98 F | OXYGEN SATURATION: 98 % | SYSTOLIC BLOOD PRESSURE: 180 MMHG | RESPIRATION RATE: 16 BRPM | HEART RATE: 78 BPM | BODY MASS INDEX: 34.8 KG/M2 | HEIGHT: 69 IN

## 2020-03-12 DIAGNOSIS — G89.29 CHRONIC BILATERAL LOW BACK PAIN WITHOUT SCIATICA: Primary | ICD-10-CM

## 2020-03-12 DIAGNOSIS — M54.50 CHRONIC BILATERAL LOW BACK PAIN WITHOUT SCIATICA: Primary | ICD-10-CM

## 2020-03-12 PROCEDURE — 99285 PR EMERGENCY DEPT VISIT,LEVEL V: ICD-10-PCS | Mod: ,,, | Performed by: EMERGENCY MEDICINE

## 2020-03-12 PROCEDURE — 99285 EMERGENCY DEPT VISIT HI MDM: CPT | Mod: ,,, | Performed by: EMERGENCY MEDICINE

## 2020-03-12 PROCEDURE — 25000003 PHARM REV CODE 250: Performed by: EMERGENCY MEDICINE

## 2020-03-12 PROCEDURE — 96372 THER/PROPH/DIAG INJ SC/IM: CPT

## 2020-03-12 PROCEDURE — 99284 EMERGENCY DEPT VISIT MOD MDM: CPT | Mod: 25

## 2020-03-12 PROCEDURE — 63600175 PHARM REV CODE 636 W HCPCS: Performed by: EMERGENCY MEDICINE

## 2020-03-12 RX ORDER — TRAMADOL HYDROCHLORIDE 50 MG/1
50 TABLET ORAL EVERY 6 HOURS PRN
Qty: 12 TABLET | Refills: 0 | Status: SHIPPED | OUTPATIENT
Start: 2020-03-12 | End: 2020-03-15

## 2020-03-12 RX ORDER — CYCLOBENZAPRINE HCL 10 MG
10 TABLET ORAL
Status: COMPLETED | OUTPATIENT
Start: 2020-03-12 | End: 2020-03-12

## 2020-03-12 RX ORDER — TIZANIDINE 2 MG/1
2 TABLET ORAL EVERY 6 HOURS PRN
Qty: 12 TABLET | Refills: 0 | Status: SHIPPED | OUTPATIENT
Start: 2020-03-12 | End: 2020-03-15

## 2020-03-12 RX ORDER — MORPHINE SULFATE 4 MG/ML
4 INJECTION, SOLUTION INTRAMUSCULAR; INTRAVENOUS
Status: COMPLETED | OUTPATIENT
Start: 2020-03-12 | End: 2020-03-12

## 2020-03-12 RX ADMIN — CYCLOBENZAPRINE 10 MG: 10 TABLET, FILM COATED ORAL at 03:03

## 2020-03-12 RX ADMIN — MORPHINE SULFATE 4 MG: 4 INJECTION INTRAVENOUS at 03:03

## 2020-03-12 NOTE — ED PROVIDER NOTES
Encounter Date: 3/12/2020    SCRIBE #1 NOTE: I, Rufus Fitch, am scribing for, and in the presence of,  Dr. Meadows. I have scribed the following portions of the note - Other sections scribed: HPI, ROS, PE.       History     Chief Complaint   Patient presents with    Back Pain     Edita Riley is a 55 y.o. female who has a past medical history of Anxiety, Chronic back pain, Fibromyalgia, GERD,  Hypertension, and neuropathy presents to the ED due to right back pain that radiates to the left. Lifting objects exacerbates this pain. This has been hampering the patient for the past year. MRI was done in 2018, but she does not remember what the findings were. At home pain medications include gabapentin and oxycodone.  Patient denies any new weakness to bilateral lower extremities or worsening of her neuropathy to lower extremities.  Patient denies any urinary or fecal incontinence.    The history is provided by the patient and medical records.     Review of patient's allergies indicates:   Allergen Reactions    Bee sting [allergen ext-venom-honey bee]      Rash      Grass pollen-bermuda, standard      rash     Past Medical History:   Diagnosis Date    Anxiety     Cervical cancer 2014    Chronic back pain     Depression     Diarrhea due to malabsorption 11/14/2018    Fibromyalgia     GERD (gastroesophageal reflux disease)     Hiatal hernia 2014    History of cervical cancer 10/11/2018    Hx of psychiatric care     Cymbalta, trazodone    Hypertension     Hypomagnesemia 11/21/2018    Lactose intolerance     Metastatic squamous cell carcinoma to lymph node 10/11/2018    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 drug 11/14/2018    Osteoarthritis of back     Psychiatric problem     Stroke 1972     Past Surgical History:   Procedure Laterality Date    BILATERAL OOPHORECTOMY  2015    CHOLECYSTECTOMY  11/09/2016    Done at [facility], path showed chronic cholecystitis and gallstones    cold knife  conization  2014    COLONOSCOPY  2014    COLONOSCOPY N/A 10/24/2016    at [facility], Dr Gutiérrez    COLONOSCOPY N/A 5/18/2018    Procedure: COLONOSCOPY;  Surgeon: Arden Gutiérrez MD;  Location: [facility] (4TH FLR);  Service: Endoscopy;  Laterality: N/A;    ESOPHAGOGASTRODUODENOSCOPY  2014    HYSTERECTOMY  2014    [facility] for cervical [facility]    OOPHORECTOMY      RETROPERITONEAL LYMPHADENECTOMY Right 9/17/2018    Procedure: LYMPHADENECTOMY, RETROPERITONEUM;  Surgeon: Sebas Reed MD;  [facility];  Service: General;  Laterality: Right;  ILIAC     Family History   Problem Relation Age of Onset    Heart disease Sister     Heart disease Maternal Grandmother     Colon cancer Father     Esophageal cancer Father     Cancer Father         Lung-smoker    Cancer Mother         Cervical    Cervical cancer Mother     Breast cancer Maternal Aunt     Suicide Daughter         jumped from parking structure    Drug abuse Daughter     Drug abuse Daughter     Rectal cancer Neg Hx     Stomach cancer Neg Hx     Crohn's disease Neg Hx     Ulcerative colitis Neg Hx     Diabetes Neg Hx     Hypertension Neg Hx      Social History     Tobacco Use    Smoking status: Former Smoker    Smokeless tobacco: Never Used   Substance Use Topics    Alcohol use: No     Alcohol/week: 0.0 standard drinks    Drug use: No     Review of Systems   Constitutional: Negative for chills and fever.   HENT: Negative for sore throat and trouble swallowing.    Eyes: Negative for pain and redness.   Respiratory: Negative for cough and shortness of breath.    Cardiovascular: Negative for chest pain and palpitations.   Gastrointestinal: Negative for nausea and vomiting.   Genitourinary: Negative for dysuria and frequency.   Musculoskeletal: Positive for back pain (radiating to her flanks). Negative for neck pain.   Skin: Negative for rash and wound.   Neurological: Negative for light-headedness and headaches.    Psychiatric/Behavioral: Negative for behavioral problems and confusion.   All other systems reviewed and are negative.      Physical Exam     Initial Vitals [03/12/20 1436]   BP Pulse Resp Temp SpO2   (!) 180/90 78 16 97.8 °F (36.6 °C) 98 %      MAP       --         Physical Exam    Nursing note and vitals reviewed.  Constitutional: She appears well-developed and well-nourished. She is not diaphoretic. No distress.   HENT:   Head: Normocephalic and atraumatic.   Nose: Nose normal.   Mouth/Throat: Oropharynx is clear and moist.   Eyes: EOM are normal. Pupils are equal, round, and reactive to light. No scleral icterus.   Neck: Normal range of motion. Neck supple. No tracheal deviation present. No JVD present.   Cardiovascular: Normal rate, regular rhythm, normal heart sounds and intact distal pulses. Exam reveals no friction rub.    No murmur heard.  Pulmonary/Chest: Breath sounds normal. No respiratory distress. She has no wheezes. She has no rhonchi. She has no rales. She exhibits no tenderness.   Abdominal: Soft. Bowel sounds are normal. She exhibits no distension. There is no tenderness. There is no rebound and no guarding.   Musculoskeletal: Normal range of motion. She exhibits no edema or tenderness.   Back:  No C/T/L-spine tenderness to palpation or percussion, positive pain with rotation and flexion, no tenderness to palpation of soft tissue, negative straight leg raise bilaterally, 5/5 motor strength bilateral lower extremities    Bilateral knees with braces present   Lymphadenopathy:     She has no cervical adenopathy.   Neurological: She is alert and oriented to person, place, and time. She has normal strength. No cranial nerve deficit.   Skin: Skin is warm and dry. Capillary refill takes less than 2 seconds. No rash noted. No erythema.   Psychiatric: She has a normal mood and affect. Thought content normal.         ED Course   Procedures  Labs Reviewed - No data to display       Imaging Results    None        X-Rays:   Independently Interpreted Readings:   Other Readings:  X-ray L-spine:  No fracture, no dislocation, no bony masses noted    Medical Decision Making:   History:   Old Medical Records: I decided to obtain old medical records.  Old Records Summarized: records from clinic visits.       <> Summary of Records: Reviewed and summarized the old medical record and it showed  Clinic visit to PMR 2/21/2020  Impression:  1. Diffuse musculoskeletal pain- myofascial in nature, suspect fibromyalgia.  Affects arms and legs bilaterally, and axially   2. Right knee pain- history of remote trauma, exam with laxity of ACL.  Also with left knee pain, but less so. XR with mild DJD in both knees.    3. Low back pain- mild spondylosis on imaging from 2018.  I suspect that most of this is biomechanical and myofascial, related to #1 above  4. Carpal tunnel syndrome right   Note: has not made any progress as after our last visit, her  was diagnosed with colon cancer and so she has been caring for him.     Plan:   - Continue Home exercise program for now- once her  is stable and she is able to return to PT, I instructed her to let me know and I will place a new order for PT again.    - Rx neoprene knee sleeves- ordered again today  - Continue with wrist splint for CTS  - As per last note, pharmcotherapy wise, I don't think there is much to add.  She takes Cymbalta which can help both with the fibromyalgia and knee arthritis.  She also takes gabapentin 300mg TID.  She is also currently taking magensium, which can help with sleep and muscle tightness.  Will try to avoid PO NSAID given her GI history.    - Discussed again today healthy lifestyle modifications- anti-inflammatory and gluten free diet.  Difficult now with having to care for her , but she is aware and had no specific questions today on this.    - Follow up with me PRN     Differential Diagnosis:   Cauda equina syndrome, diskitis/osteomyelitis,  epidural/paraspinal abscess, AAA, aortic dissection, post-op/hardware infection, trauma/vertebral fracture, spinal cord injury, disc herniation, spinal stenosis, sciatica, radiculopathy, neoplasm, lumbar muscle strain, muscle spasm, neuropathic pain, UTI/pyelonephritis, nephrolithiasis.    Independently Interpreted Test(s):   I have ordered and independently interpreted X-rays - see prior notes.  Clinical Tests:   Radiological Study: Ordered and Reviewed  ED Management:  After complete evaluation, including thorough history and physical exam, the patient's symptoms are most likely due to mechanical/MSK back pain. There are no concerning features of bilateral weakness, bowel/bladder incontinence, significant new motor/sensory deficits, or saddle anesthesia to suggest acute cauda equina syndrome. On physical exam, there is no focal midline tenderness or evidence of significant trauma to suggest fracture or injury. There is no fever, immunocompromise, history of recent surgery, or erythema/fluctuance to suggest epidural hematoma, infection, or abscess.              Scribe Attestation:   Scribe #1: I performed the above scribed service and the documentation accurately describes the services I performed. I attest to the accuracy of the note.            ED Course as of Mar 12 1544   Thu Mar 12, 2020   1543 Multiple attempts were made to find the patient and discuss her Xray, see if her pain had improved and to discuss discharge / follow up plan, but the patient could not be found.  Security reports that the last time he saw the patient she walked out of the emergency department and got into a vehicle.  Patient did not return phone calls.  Discharge paperwork and prescriptions were left with results waiting room nurse.   [MA]      ED Course User Index  [MA] Lyndon Meadows MD                Clinical Impression:       ICD-10-CM ICD-9-CM   1. Chronic bilateral low back pain without sciatica M54.5 724.2    G89.29 338.29                                 Lyndon Meadows MD  03/14/20 9216

## 2020-03-12 NOTE — ED NOTES
Patient identifiers have been checked and are correct.      LOC: The patient is awake, alert, and aware of environment. The patient is oriented x 3 and speaking appropriately.   APPEARANCE: No acute distress noted.   SKIN: The skin is warm, dry  RESPIRATORY: Airway is open and patent. Bilateral chest rise and fall. Respirations are spontaneous, even and unlabored. Normal effort and rate noted. No accessory muscle use noted.   CARDIAC: Patient has a normal rate    URINARY:  Voids independently.   NEUROLOGIC: Eyes open spontaneously. Speech clear. Tolerating saliva secretions well. Able to follow commands, demonstrating ability to actively and appropriately communicate within context of current conversation. Symmetrical facial muscles. Moving all extremities. Movement is purposeful.   MUSCULOSKELETAL: No obvious deformities noted. C/o mid lower back pain.

## 2020-03-12 NOTE — TELEPHONE ENCOUNTER
Returned pts call on number provided in message spoke with family member he state he brought pt to main campus. Pt has been admitted.     ----- Message from Lopez Pollock sent at 3/12/2020  1:55 PM CDT -----  Contact: pt @ [phone]  Pt asking to speak w/ the nurse, pt states she's having serious back pain

## 2020-03-12 NOTE — ED TRIAGE NOTES
C/o lower back pain since yesterday after doing house work. Denies any problems with bowel or bladder. Has rx for tramadol and flexeril but meds not helping.

## 2020-03-13 ENCOUNTER — TELEPHONE (OUTPATIENT)
Dept: INTERNAL MEDICINE | Facility: CLINIC | Age: 55
End: 2020-03-13

## 2020-03-13 ENCOUNTER — TELEPHONE (OUTPATIENT)
Dept: NEUROLOGY | Facility: CLINIC | Age: 55
End: 2020-03-13

## 2020-03-13 NOTE — TELEPHONE ENCOUNTER
----- Message from Lopez Pollock sent at 3/13/2020 11:02 AM CDT -----  Contact: pt @ [phone]  Pt asking to speak w/ the nurse, states she took XRAYS at ED recently and wants the doctor to be aware

## 2020-03-13 NOTE — TELEPHONE ENCOUNTER
----- Message from Sofia Echevarria sent at 3/13/2020  2:19 PM CDT -----  Contact: Pt self Mobile [phone]  Patient is calling in regards to her saying that she have pain in her lower back since yesterday and she would like for you to write her a script for some muscle relaxer's and she would like for you to send it too..    [id number] - [place], [place] - [facility] [address].  Phone# [phone], fax# [phone]

## 2020-03-21 ENCOUNTER — HOSPITAL ENCOUNTER (INPATIENT)
Facility: HOSPITAL | Age: 55
LOS: 1 days | Discharge: HOME HEALTH | DRG: 660 | End: 2020-03-22
Attending: EMERGENCY MEDICINE | Admitting: EMERGENCY MEDICINE
Payer: MEDICAID

## 2020-03-21 ENCOUNTER — ANESTHESIA EVENT (OUTPATIENT)
Dept: SURGERY | Facility: HOSPITAL | Age: 55
DRG: 660 | End: 2020-03-21
Payer: MEDICAID

## 2020-03-21 ENCOUNTER — ANESTHESIA (OUTPATIENT)
Dept: SURGERY | Facility: HOSPITAL | Age: 55
DRG: 660 | End: 2020-03-21
Payer: MEDICAID

## 2020-03-21 DIAGNOSIS — N39.0 URINARY TRACT INFECTION WITHOUT HEMATURIA, SITE UNSPECIFIED: ICD-10-CM

## 2020-03-21 DIAGNOSIS — C77.9 METASTATIC SQUAMOUS CELL CARCINOMA TO LYMPH NODE: Chronic | ICD-10-CM

## 2020-03-21 DIAGNOSIS — N17.9 AKI (ACUTE KIDNEY INJURY): Primary | ICD-10-CM

## 2020-03-21 DIAGNOSIS — N13.30 HYDRONEPHROSIS, UNSPECIFIED HYDRONEPHROSIS TYPE: ICD-10-CM

## 2020-03-21 DIAGNOSIS — N13.30 HYDRONEPHROSIS OF RIGHT KIDNEY: ICD-10-CM

## 2020-03-21 PROBLEM — K21.00 GASTROESOPHAGEAL REFLUX DISEASE WITH ESOPHAGITIS: Chronic | Status: ACTIVE | Noted: 2017-11-16

## 2020-03-21 PROBLEM — F33.2 SEVERE RECURRENT MAJOR DEPRESSION WITHOUT PSYCHOTIC FEATURES: Chronic | Status: ACTIVE | Noted: 2018-10-17

## 2020-03-21 PROBLEM — Z85.41 HISTORY OF CERVICAL CANCER: Chronic | Status: ACTIVE | Noted: 2018-10-11

## 2020-03-21 PROBLEM — N12 PYELONEPHRITIS OF RIGHT KIDNEY: Status: ACTIVE | Noted: 2020-03-21

## 2020-03-21 LAB
ALBUMIN SERPL BCP-MCNC: 2.6 G/DL (ref 3.5–5.2)
ALP SERPL-CCNC: 147 U/L (ref 55–135)
ALT SERPL W/O P-5'-P-CCNC: 20 U/L (ref 10–44)
AMORPH CRY UR QL COMP ASSIST: ABNORMAL
ANION GAP SERPL CALC-SCNC: 10 MMOL/L (ref 8–16)
AST SERPL-CCNC: 22 U/L (ref 10–40)
BACTERIA #/AREA URNS AUTO: ABNORMAL /HPF
BASOPHILS # BLD AUTO: 0.05 K/UL (ref 0–0.2)
BASOPHILS NFR BLD: 0.3 % (ref 0–1.9)
BILIRUB SERPL-MCNC: 0.8 MG/DL (ref 0.1–1)
BILIRUB UR QL STRIP: NEGATIVE
BUN SERPL-MCNC: 18 MG/DL (ref 6–20)
CALCIUM SERPL-MCNC: 9.4 MG/DL (ref 8.7–10.5)
CHLORIDE SERPL-SCNC: 103 MMOL/L (ref 95–110)
CLARITY UR REFRACT.AUTO: ABNORMAL
CO2 SERPL-SCNC: 23 MMOL/L (ref 23–29)
COLOR UR AUTO: ABNORMAL
CREAT SERPL-MCNC: 1.9 MG/DL (ref 0.5–1.4)
DIFFERENTIAL METHOD: ABNORMAL
EOSINOPHIL # BLD AUTO: 0.3 K/UL (ref 0–0.5)
EOSINOPHIL NFR BLD: 1.8 % (ref 0–8)
ERYTHROCYTE [DISTWIDTH] IN BLOOD BY AUTOMATED COUNT: 15.5 % (ref 11.5–14.5)
EST. GFR  (AFRICAN AMERICAN): 33.3 ML/MIN/1.73 M^2
EST. GFR  (NON AFRICAN AMERICAN): 28.9 ML/MIN/1.73 M^2
GLUCOSE SERPL-MCNC: 177 MG/DL (ref 70–110)
GLUCOSE UR QL STRIP: NEGATIVE
HCT VFR BLD AUTO: 34.2 % (ref 37–48.5)
HGB BLD-MCNC: 10.5 G/DL (ref 12–16)
HGB UR QL STRIP: ABNORMAL
HYALINE CASTS UR QL AUTO: 0 /LPF
IMM GRANULOCYTES # BLD AUTO: 0.15 K/UL (ref 0–0.04)
IMM GRANULOCYTES NFR BLD AUTO: 1 % (ref 0–0.5)
KETONES UR QL STRIP: NEGATIVE
LEUKOCYTE ESTERASE UR QL STRIP: NEGATIVE
LIPASE SERPL-CCNC: 10 U/L (ref 4–60)
LYMPHOCYTES # BLD AUTO: 1.4 K/UL (ref 1–4.8)
LYMPHOCYTES NFR BLD: 9.5 % (ref 18–48)
MCH RBC QN AUTO: 27.2 PG (ref 27–31)
MCHC RBC AUTO-ENTMCNC: 30.7 G/DL (ref 32–36)
MCV RBC AUTO: 89 FL (ref 82–98)
MICROSCOPIC COMMENT: ABNORMAL
MONOCYTES # BLD AUTO: 1.6 K/UL (ref 0.3–1)
MONOCYTES NFR BLD: 11.1 % (ref 4–15)
NEUTROPHILS # BLD AUTO: 11 K/UL (ref 1.8–7.7)
NEUTROPHILS NFR BLD: 76.3 % (ref 38–73)
NITRITE UR QL STRIP: NEGATIVE
NRBC BLD-RTO: 0 /100 WBC
PH UR STRIP: 5 [PH] (ref 5–8)
PLATELET # BLD AUTO: 356 K/UL (ref 150–350)
PMV BLD AUTO: 10.5 FL (ref 9.2–12.9)
POTASSIUM SERPL-SCNC: 3.5 MMOL/L (ref 3.5–5.1)
PROT SERPL-MCNC: 8 G/DL (ref 6–8.4)
PROT UR QL STRIP: ABNORMAL
RBC # BLD AUTO: 3.86 M/UL (ref 4–5.4)
RBC #/AREA URNS AUTO: 7 /HPF (ref 0–4)
SODIUM SERPL-SCNC: 136 MMOL/L (ref 136–145)
SP GR UR STRIP: 1.02 (ref 1–1.03)
SQUAMOUS #/AREA URNS AUTO: 3 /HPF
URN SPEC COLLECT METH UR: ABNORMAL
WBC # BLD AUTO: 14.36 K/UL (ref 3.9–12.7)
WBC #/AREA URNS AUTO: 15 /HPF (ref 0–5)

## 2020-03-21 PROCEDURE — 36000707: Performed by: UROLOGY

## 2020-03-21 PROCEDURE — 88342 IMHCHEM/IMCYTCHM 1ST ANTB: CPT | Performed by: PATHOLOGY

## 2020-03-21 PROCEDURE — 80053 COMPREHEN METABOLIC PANEL: CPT

## 2020-03-21 PROCEDURE — 36000706: Performed by: UROLOGY

## 2020-03-21 PROCEDURE — 88342 IMHCHEM/IMCYTCHM 1ST ANTB: CPT | Mod: 26,,, | Performed by: PATHOLOGY

## 2020-03-21 PROCEDURE — 74420 PR  X-RAY RETROGRADE PYELOGRAM: ICD-10-PCS | Mod: 26,ICN,, | Performed by: UROLOGY

## 2020-03-21 PROCEDURE — 88305 TISSUE EXAM BY PATHOLOGIST: CPT | Mod: 26,,, | Performed by: PATHOLOGY

## 2020-03-21 PROCEDURE — 88341 PR IHC OR ICC EACH ADD'L SINGLE ANTIBODY  STAINPR: ICD-10-PCS | Mod: 26,,, | Performed by: PATHOLOGY

## 2020-03-21 PROCEDURE — 25500020 PHARM REV CODE 255: Performed by: UROLOGY

## 2020-03-21 PROCEDURE — 25000003 PHARM REV CODE 250: Performed by: NURSE ANESTHETIST, CERTIFIED REGISTERED

## 2020-03-21 PROCEDURE — 96375 TX/PRO/DX INJ NEW DRUG ADDON: CPT

## 2020-03-21 PROCEDURE — C1752 CATH,HEMODIALYSIS,SHORT-TERM: HCPCS | Performed by: UROLOGY

## 2020-03-21 PROCEDURE — 99285 PR EMERGENCY DEPT VISIT,LEVEL V: ICD-10-PCS | Mod: ,,, | Performed by: EMERGENCY MEDICINE

## 2020-03-21 PROCEDURE — 25000003 PHARM REV CODE 250: Performed by: STUDENT IN AN ORGANIZED HEALTH CARE EDUCATION/TRAINING PROGRAM

## 2020-03-21 PROCEDURE — 99223 PR INITIAL HOSPITAL CARE,LEVL III: ICD-10-PCS | Mod: ,,, | Performed by: INTERNAL MEDICINE

## 2020-03-21 PROCEDURE — 88341 IMHCHEM/IMCYTCHM EA ADD ANTB: CPT | Performed by: PATHOLOGY

## 2020-03-21 PROCEDURE — 88342 CHG IMMUNOCYTOCHEMISTRY: ICD-10-PCS | Mod: 26,,, | Performed by: PATHOLOGY

## 2020-03-21 PROCEDURE — 83690 ASSAY OF LIPASE: CPT

## 2020-03-21 PROCEDURE — 87186 SC STD MICRODIL/AGAR DIL: CPT

## 2020-03-21 PROCEDURE — 96361 HYDRATE IV INFUSION ADD-ON: CPT

## 2020-03-21 PROCEDURE — C1769 GUIDE WIRE: HCPCS | Performed by: UROLOGY

## 2020-03-21 PROCEDURE — 74420 UROGRAPHY RTRGR +-KUB: CPT | Mod: 26,ICN,, | Performed by: UROLOGY

## 2020-03-21 PROCEDURE — C2617 STENT, NON-COR, TEM W/O DEL: HCPCS | Performed by: UROLOGY

## 2020-03-21 PROCEDURE — 71000033 HC RECOVERY, INTIAL HOUR: Performed by: UROLOGY

## 2020-03-21 PROCEDURE — 81001 URINALYSIS AUTO W/SCOPE: CPT

## 2020-03-21 PROCEDURE — 88341 IMHCHEM/IMCYTCHM EA ADD ANTB: CPT | Mod: 26,,, | Performed by: PATHOLOGY

## 2020-03-21 PROCEDURE — 11000001 HC ACUTE MED/SURG PRIVATE ROOM

## 2020-03-21 PROCEDURE — D9220A PRA ANESTHESIA: ICD-10-PCS | Mod: ANES,,, | Performed by: ANESTHESIOLOGY

## 2020-03-21 PROCEDURE — 71000015 HC POSTOP RECOV 1ST HR: Performed by: UROLOGY

## 2020-03-21 PROCEDURE — 37000008 HC ANESTHESIA 1ST 15 MINUTES: Performed by: UROLOGY

## 2020-03-21 PROCEDURE — 87088 URINE BACTERIA CULTURE: CPT

## 2020-03-21 PROCEDURE — 25000003 PHARM REV CODE 250: Performed by: UROLOGY

## 2020-03-21 PROCEDURE — 52235 CYSTOSCOPY AND TREATMENT: CPT | Mod: ICN,,, | Performed by: UROLOGY

## 2020-03-21 PROCEDURE — 87086 URINE CULTURE/COLONY COUNT: CPT

## 2020-03-21 PROCEDURE — D9220A PRA ANESTHESIA: Mod: CRNA,,, | Performed by: NURSE ANESTHETIST, CERTIFIED REGISTERED

## 2020-03-21 PROCEDURE — 85025 COMPLETE CBC W/AUTO DIFF WBC: CPT

## 2020-03-21 PROCEDURE — 99285 EMERGENCY DEPT VISIT HI MDM: CPT | Mod: 25

## 2020-03-21 PROCEDURE — 63600175 PHARM REV CODE 636 W HCPCS: Performed by: EMERGENCY MEDICINE

## 2020-03-21 PROCEDURE — 96374 THER/PROPH/DIAG INJ IV PUSH: CPT

## 2020-03-21 PROCEDURE — 99223 1ST HOSP IP/OBS HIGH 75: CPT | Mod: ,,, | Performed by: INTERNAL MEDICINE

## 2020-03-21 PROCEDURE — 52332 PR CYSTOSCOPY,INSERT URETERAL STENT: ICD-10-PCS | Mod: 50,59,ICN, | Performed by: UROLOGY

## 2020-03-21 PROCEDURE — 27201423 OPTIME MED/SURG SUP & DEVICES STERILE SUPPLY: Performed by: UROLOGY

## 2020-03-21 PROCEDURE — 99285 EMERGENCY DEPT VISIT HI MDM: CPT | Mod: ,,, | Performed by: EMERGENCY MEDICINE

## 2020-03-21 PROCEDURE — 63600175 PHARM REV CODE 636 W HCPCS: Performed by: ANESTHESIOLOGY

## 2020-03-21 PROCEDURE — D9220A PRA ANESTHESIA: ICD-10-PCS | Mod: CRNA,,, | Performed by: NURSE ANESTHETIST, CERTIFIED REGISTERED

## 2020-03-21 PROCEDURE — 63600175 PHARM REV CODE 636 W HCPCS: Performed by: NURSE ANESTHETIST, CERTIFIED REGISTERED

## 2020-03-21 PROCEDURE — C1758 CATHETER, URETERAL: HCPCS | Performed by: UROLOGY

## 2020-03-21 PROCEDURE — 37000009 HC ANESTHESIA EA ADD 15 MINS: Performed by: UROLOGY

## 2020-03-21 PROCEDURE — 88305 TISSUE EXAM BY PATHOLOGIST: CPT | Performed by: PATHOLOGY

## 2020-03-21 PROCEDURE — D9220A PRA ANESTHESIA: Mod: ANES,,, | Performed by: ANESTHESIOLOGY

## 2020-03-21 PROCEDURE — 52235 PR CYSTOURETHROSCOPY,FULGUR 2-5 CM LESN: ICD-10-PCS | Mod: ICN,,, | Performed by: UROLOGY

## 2020-03-21 PROCEDURE — 87040 BLOOD CULTURE FOR BACTERIA: CPT | Mod: 59

## 2020-03-21 PROCEDURE — 71000016 HC POSTOP RECOV ADDL HR: Performed by: UROLOGY

## 2020-03-21 PROCEDURE — 87077 CULTURE AEROBIC IDENTIFY: CPT

## 2020-03-21 PROCEDURE — 88305 TISSUE EXAM BY PATHOLOGIST: ICD-10-PCS | Mod: 26,,, | Performed by: PATHOLOGY

## 2020-03-21 PROCEDURE — 52332 CYSTOSCOPY AND TREATMENT: CPT | Mod: 50,59,ICN, | Performed by: UROLOGY

## 2020-03-21 DEVICE — STENT URETERAL UNIV 6FR 24CM
Type: IMPLANTABLE DEVICE | Site: URETER | Status: NON-FUNCTIONAL
Removed: 2020-06-01

## 2020-03-21 DEVICE — STENT URETERAL UNIV 6FR 26CM
Type: IMPLANTABLE DEVICE | Site: URETER | Status: NON-FUNCTIONAL
Removed: 2020-06-01

## 2020-03-21 RX ORDER — DULOXETIN HYDROCHLORIDE 60 MG/1
60 CAPSULE, DELAYED RELEASE ORAL DAILY
Status: DISCONTINUED | OUTPATIENT
Start: 2020-03-21 | End: 2020-03-22 | Stop reason: HOSPADM

## 2020-03-21 RX ORDER — FENTANYL CITRATE 50 UG/ML
INJECTION, SOLUTION INTRAMUSCULAR; INTRAVENOUS
Status: DISCONTINUED | OUTPATIENT
Start: 2020-03-21 | End: 2020-03-21

## 2020-03-21 RX ORDER — OXYBUTYNIN CHLORIDE 5 MG/1
5 TABLET ORAL 3 TIMES DAILY
Status: DISCONTINUED | OUTPATIENT
Start: 2020-03-21 | End: 2020-03-22 | Stop reason: HOSPADM

## 2020-03-21 RX ORDER — DEXAMETHASONE SODIUM PHOSPHATE 4 MG/ML
INJECTION, SOLUTION INTRA-ARTICULAR; INTRALESIONAL; INTRAMUSCULAR; INTRAVENOUS; SOFT TISSUE
Status: DISCONTINUED | OUTPATIENT
Start: 2020-03-21 | End: 2020-03-21

## 2020-03-21 RX ORDER — MIDAZOLAM HYDROCHLORIDE 1 MG/ML
INJECTION INTRAMUSCULAR; INTRAVENOUS
Status: DISCONTINUED | OUTPATIENT
Start: 2020-03-21 | End: 2020-03-21

## 2020-03-21 RX ORDER — SUCCINYLCHOLINE CHLORIDE 20 MG/ML
INJECTION INTRAMUSCULAR; INTRAVENOUS
Status: DISCONTINUED | OUTPATIENT
Start: 2020-03-21 | End: 2020-03-21

## 2020-03-21 RX ORDER — ONDANSETRON 2 MG/ML
4 INJECTION INTRAMUSCULAR; INTRAVENOUS ONCE AS NEEDED
Status: DISCONTINUED | OUTPATIENT
Start: 2020-03-21 | End: 2020-03-22 | Stop reason: HOSPADM

## 2020-03-21 RX ORDER — IBUPROFEN 200 MG
24 TABLET ORAL
Status: DISCONTINUED | OUTPATIENT
Start: 2020-03-21 | End: 2020-03-22 | Stop reason: HOSPADM

## 2020-03-21 RX ORDER — ROCURONIUM BROMIDE 10 MG/ML
INJECTION, SOLUTION INTRAVENOUS
Status: DISCONTINUED | OUTPATIENT
Start: 2020-03-21 | End: 2020-03-21

## 2020-03-21 RX ORDER — CEFTRIAXONE 1 G/1
1 INJECTION, POWDER, FOR SOLUTION INTRAMUSCULAR; INTRAVENOUS
Status: DISCONTINUED | OUTPATIENT
Start: 2020-03-22 | End: 2020-03-22 | Stop reason: HOSPADM

## 2020-03-21 RX ORDER — ONDANSETRON 2 MG/ML
INJECTION INTRAMUSCULAR; INTRAVENOUS
Status: DISCONTINUED | OUTPATIENT
Start: 2020-03-21 | End: 2020-03-21

## 2020-03-21 RX ORDER — PHENYLEPHRINE HCL IN 0.9% NACL 1 MG/10 ML
SYRINGE (ML) INTRAVENOUS
Status: DISCONTINUED | OUTPATIENT
Start: 2020-03-21 | End: 2020-03-21

## 2020-03-21 RX ORDER — ONDANSETRON 8 MG/1
8 TABLET, ORALLY DISINTEGRATING ORAL EVERY 8 HOURS PRN
Status: DISCONTINUED | OUTPATIENT
Start: 2020-03-21 | End: 2020-03-22 | Stop reason: HOSPADM

## 2020-03-21 RX ORDER — ACETAMINOPHEN 325 MG/1
650 TABLET ORAL EVERY 4 HOURS PRN
Status: DISCONTINUED | OUTPATIENT
Start: 2020-03-21 | End: 2020-03-22 | Stop reason: HOSPADM

## 2020-03-21 RX ORDER — LIDOCAINE HCL/PF 100 MG/5ML
SYRINGE (ML) INTRAVENOUS
Status: DISCONTINUED | OUTPATIENT
Start: 2020-03-21 | End: 2020-03-21

## 2020-03-21 RX ORDER — TRAZODONE HYDROCHLORIDE 50 MG/1
50 TABLET ORAL NIGHTLY
Status: DISCONTINUED | OUTPATIENT
Start: 2020-03-21 | End: 2020-03-22 | Stop reason: HOSPADM

## 2020-03-21 RX ORDER — GABAPENTIN 100 MG/1
100 CAPSULE ORAL 3 TIMES DAILY
Status: DISCONTINUED | OUTPATIENT
Start: 2020-03-21 | End: 2020-03-22 | Stop reason: HOSPADM

## 2020-03-21 RX ORDER — LIDOCAINE HYDROCHLORIDE 20 MG/ML
JELLY TOPICAL
Status: DISCONTINUED | OUTPATIENT
Start: 2020-03-21 | End: 2020-03-21 | Stop reason: HOSPADM

## 2020-03-21 RX ORDER — FENTANYL CITRATE 50 UG/ML
25 INJECTION, SOLUTION INTRAMUSCULAR; INTRAVENOUS EVERY 5 MIN PRN
Status: COMPLETED | OUTPATIENT
Start: 2020-03-21 | End: 2020-03-21

## 2020-03-21 RX ORDER — PROPOFOL 10 MG/ML
VIAL (ML) INTRAVENOUS
Status: DISCONTINUED | OUTPATIENT
Start: 2020-03-21 | End: 2020-03-21

## 2020-03-21 RX ORDER — IBUPROFEN 200 MG
16 TABLET ORAL
Status: DISCONTINUED | OUTPATIENT
Start: 2020-03-21 | End: 2020-03-22 | Stop reason: HOSPADM

## 2020-03-21 RX ORDER — HYDROMORPHONE HYDROCHLORIDE 1 MG/ML
0.2 INJECTION, SOLUTION INTRAMUSCULAR; INTRAVENOUS; SUBCUTANEOUS EVERY 5 MIN PRN
Status: DISCONTINUED | OUTPATIENT
Start: 2020-03-21 | End: 2020-03-22

## 2020-03-21 RX ORDER — MORPHINE SULFATE 4 MG/ML
4 INJECTION, SOLUTION INTRAMUSCULAR; INTRAVENOUS
Status: COMPLETED | OUTPATIENT
Start: 2020-03-21 | End: 2020-03-21

## 2020-03-21 RX ORDER — HEPARIN SODIUM 5000 [USP'U]/ML
5000 INJECTION, SOLUTION INTRAVENOUS; SUBCUTANEOUS EVERY 8 HOURS
Status: DISCONTINUED | OUTPATIENT
Start: 2020-03-21 | End: 2020-03-21

## 2020-03-21 RX ORDER — TAMSULOSIN HYDROCHLORIDE 0.4 MG/1
0.4 CAPSULE ORAL DAILY
Status: DISCONTINUED | OUTPATIENT
Start: 2020-03-22 | End: 2020-03-22 | Stop reason: HOSPADM

## 2020-03-21 RX ORDER — SODIUM CHLORIDE 0.9 % (FLUSH) 0.9 %
10 SYRINGE (ML) INJECTION
Status: DISCONTINUED | OUTPATIENT
Start: 2020-03-21 | End: 2020-03-22 | Stop reason: HOSPADM

## 2020-03-21 RX ORDER — ONDANSETRON 2 MG/ML
4 INJECTION INTRAMUSCULAR; INTRAVENOUS
Status: COMPLETED | OUTPATIENT
Start: 2020-03-21 | End: 2020-03-21

## 2020-03-21 RX ORDER — GLUCAGON 1 MG
1 KIT INJECTION
Status: DISCONTINUED | OUTPATIENT
Start: 2020-03-21 | End: 2020-03-22 | Stop reason: HOSPADM

## 2020-03-21 RX ORDER — CEFTRIAXONE 1 G/1
1 INJECTION, POWDER, FOR SOLUTION INTRAMUSCULAR; INTRAVENOUS
Status: COMPLETED | OUTPATIENT
Start: 2020-03-21 | End: 2020-03-21

## 2020-03-21 RX ADMIN — SUCCINYLCHOLINE CHLORIDE 100 MG: 20 INJECTION, SOLUTION INTRAMUSCULAR; INTRAVENOUS at 04:03

## 2020-03-21 RX ADMIN — ROCURONIUM BROMIDE 20 MG: 10 INJECTION, SOLUTION INTRAVENOUS at 04:03

## 2020-03-21 RX ADMIN — ONDANSETRON 4 MG: 2 INJECTION INTRAMUSCULAR; INTRAVENOUS at 04:03

## 2020-03-21 RX ADMIN — CEFTRIAXONE SODIUM 1 G: 1 INJECTION, POWDER, FOR SOLUTION INTRAMUSCULAR; INTRAVENOUS at 12:03

## 2020-03-21 RX ADMIN — Medication 100 MCG: at 04:03

## 2020-03-21 RX ADMIN — FENTANYL CITRATE 100 MCG: 50 INJECTION, SOLUTION INTRAMUSCULAR; INTRAVENOUS at 04:03

## 2020-03-21 RX ADMIN — SODIUM CHLORIDE 1000 ML: 0.9 INJECTION, SOLUTION INTRAVENOUS at 08:03

## 2020-03-21 RX ADMIN — FENTANYL CITRATE 25 MCG: 50 INJECTION INTRAMUSCULAR; INTRAVENOUS at 05:03

## 2020-03-21 RX ADMIN — HYDROMORPHONE HYDROCHLORIDE 0.2 MG: 1 INJECTION, SOLUTION INTRAMUSCULAR; INTRAVENOUS; SUBCUTANEOUS at 07:03

## 2020-03-21 RX ADMIN — ONDANSETRON 4 MG: 2 INJECTION INTRAMUSCULAR; INTRAVENOUS at 08:03

## 2020-03-21 RX ADMIN — PROPOFOL 50 MG: 10 INJECTION, EMULSION INTRAVENOUS at 04:03

## 2020-03-21 RX ADMIN — DEXAMETHASONE SODIUM PHOSPHATE 4 MG: 4 INJECTION, SOLUTION INTRA-ARTICULAR; INTRALESIONAL; INTRAMUSCULAR; INTRAVENOUS; SOFT TISSUE at 04:03

## 2020-03-21 RX ADMIN — GABAPENTIN 100 MG: 100 CAPSULE ORAL at 08:03

## 2020-03-21 RX ADMIN — TRAZODONE HYDROCHLORIDE 50 MG: 50 TABLET ORAL at 08:03

## 2020-03-21 RX ADMIN — MIDAZOLAM HYDROCHLORIDE 1 MG: 1 INJECTION, SOLUTION INTRAMUSCULAR; INTRAVENOUS at 04:03

## 2020-03-21 RX ADMIN — OXYBUTYNIN CHLORIDE 5 MG: 5 TABLET ORAL at 08:03

## 2020-03-21 RX ADMIN — ROCURONIUM BROMIDE 5 MG: 10 INJECTION, SOLUTION INTRAVENOUS at 04:03

## 2020-03-21 RX ADMIN — LIDOCAINE HYDROCHLORIDE 100 MG: 20 INJECTION, SOLUTION INTRAVENOUS at 04:03

## 2020-03-21 RX ADMIN — SODIUM CHLORIDE, SODIUM GLUCONATE, SODIUM ACETATE, POTASSIUM CHLORIDE, MAGNESIUM CHLORIDE, SODIUM PHOSPHATE, DIBASIC, AND POTASSIUM PHOSPHATE: .53; .5; .37; .037; .03; .012; .00082 INJECTION, SOLUTION INTRAVENOUS at 03:03

## 2020-03-21 RX ADMIN — INDIGO CARMINE 40 MG: 8 INJECTION, SOLUTION INTRAMUSCULAR; INTRAVENOUS at 04:03

## 2020-03-21 RX ADMIN — MORPHINE SULFATE 4 MG: 4 INJECTION INTRAVENOUS at 12:03

## 2020-03-21 RX ADMIN — PROPOFOL 150 MG: 10 INJECTION, EMULSION INTRAVENOUS at 04:03

## 2020-03-21 RX ADMIN — SUGAMMADEX 200 MG: 100 INJECTION, SOLUTION INTRAVENOUS at 05:03

## 2020-03-21 RX ADMIN — SODIUM CHLORIDE 1000 ML: 0.9 INJECTION, SOLUTION INTRAVENOUS at 10:03

## 2020-03-21 NOTE — ANESTHESIA PROCEDURE NOTES
Intubation  Performed by: Cathy Culp CRNA  Authorized by: Micah Watson MD     Intubation:     Induction:  Rapid sequence induction    Intubated:  Postinduction    Mask Ventilation:  N/a    Attempts:  1    Attempted By:  CRNA    Method of Intubation:  Video laryngoscopy    Blade:  Tian 3    Laryngeal View Grade: Grade I - full view of chords      Difficult Airway Encountered?: No      Complications:  None    Airway Device:  Oral endotracheal tube    Airway Device Size:  7.0    Style/Cuff Inflation:  Cuffed    Inflation Amount (mL):  5    Tube secured:  21    Secured at:  The lips    Placement Verified By:  Capnometry    Complicating Factors:  Anterior larynx and large prominent central incisors  Notes:      Used tian on 1st attempt due to prior documentation of anterior airway.  Grade ii and iii views obtain with das and mac, respectively, in past.

## 2020-03-21 NOTE — OP NOTE
[facility]  Operative Note    Date: 03/21/2020    Pre-Op Diagnosis: right hydronephrosis     Post-Op Diagnosis: same, radiation cystitis vs bladder tumor    Procedure(s) Performed:   1.  Cystoscopy with bilateral retrograde pyelograms  2.  Bilateral ureteral stent placement   3.  TURBT of areas lateral to each UO that were concerning for radiation cystitis vs bladder tumor  4.  Fluoroscopy < 1 hour    Specimen(s):   Bladder tumor    Staff Surgeon: Leslie Balbuena MD    Assistant Surgeon: Mana Wilks MD    Anesthesia: General endotracheal anesthesia    Indications: Edita Riley is a 55 y.o. female with history of cervical CA s/p radiation admitted with right flank pain and ODESSA. CT showed new right hydronephrosis.      Findings:   - Throughout the bladder base there were areas of heaped up tissue concerning for bladder tumor versus radiation cystitis.  The areas lateral to each UO were resected and tissue was sent for pathology  - the right UO was very difficult to find due to a heaped up tissue in the area of the UO, it was cannulated using a motion wire in an Albarrans bridge with a 70 degree lens   - decision was made to place a left ureteral stent in addition to a right ureteral stent due to COVID precautions - negating the need to return to the hospital in the case of left hydronephrosis in the future  - Right retrograde pyelogram significant for hydronephrosis  - left retrograde pyelogram showed small intrarenal pelvis, delicate ureter with normal course and caliber    Estimated Blood Loss: min    Drains:   6 Fr x 24 cm JJ ureteral stent without strings on the right  6 Fr x 26 cm JJ ureteral stent without strings on the left  20 Fr nino catheter     Procedure in Detail:  After risks, benefits and possible complications of cystoscopy were explained, the patient elected to undergo the procedure and informed consent was obtained. All questions were answered in the  socorro-operative area. The patient was transferred to the cystoscopy suite and placed in the supine position.  SCDs were applied and working.  Anesthesia was administered.  Once the patient was adequately sedated, she was placed in the dorsal lithotomy position and prepped and draped in the usual sterile fashion.  Time out was performed, socorro-procedural antibiotics were confirmed.     A rigid cystoscope in a 22 Fr sheath was introduced into the bladder per urethra. This passed easily.  The entire urethra was visualized which showed no masses or strictures.  Formal cystoscopy was performed which revealed areas of heaped up erythematous tissue at the bladder base and lateral to each UO that were concerning for changes due to radiation versus bladder tumor.  The left UO was visible, however the right UO was difficult to find due to being embedded within the heaped up tissue.  Methylene blue was given to the patient to help locate the right UO, however we only saw blue dye coming from the left UO.     The standard bridge was exchanged for an Sudeep bridge.  The 30 degree lens was exchanged 70 degree lens.  In using this combination we were able to find enter the right UO with the motion wire.  Using fluoro we were able to confirm the placement of the motion wire within renal pelvis.  We inserted our 5 Kazakh ureteral catheter over the wire and performed a retrograde pyelogram showing hydronephrosis.  We then removed the 5 Kazakh ureteral catheter and exchanged it for a 6 Fr by 24 cm JJ ureteral stent. A 180 degree coil was observed in the renal pelvis as well as the bladder using fluoroscopy.  A 180 degree coil was also seen using direct visualization in the bladder.     We then turned our attention to the left side.  We we decided it would be of the patient's best interest to place a left ureteral stent at this time. Using the rigid cystoscope, we were able to advance the motion wire just inside the left UO.  We then  placed the 5 Finnish ureteral catheter over the wire and performed a retrograde pyelogram.  This left retrograde pyelogram showed a delicate ureter with normal course and caliber.  There were no filling defects.  The kidney had a small intrarenal pelvis.  We then inserted a 6 Fr x 26 cm JJ ureteral stent without strings over the wire to the level of the renal pelvis under direct vision as well as fluoroscopy. The guide wire was removed.  A 180 degree coil was observed in the renal pelvis as well as the bladder using fluoroscopy.  A 180 degree coil was also seen using direct visualization in the bladder.     We then turned our attention to the areas of heaped up erythematous tissue most apparent lateral to each UO.  We exchanged our cystoscope for a resectoscope.  We proceeded to resect concerning areas lateral to each UO - 1cm on each side. The tissue specimens were irrigated out from the bladder and passed off the field for analysis.  Hemostasis was achieved.    The bladder was drained, and the patient was removed from lithotomy.     The patient tolerated the procedure well and was transferred to recovery in stable condition.    Disposition:  The patient is currently admitted to Internal Medicine.  She will maintain her Little catheter overnight.  We will arrange for her follow-up with Dr. Balbuena in 2 months.  She will be notified of pathology results in 2 weeks. Recommend flomax daily and oxybutynin PRN bladder spasms.    MD ISAAK Bernard was present for the entire case and agree with the above note.

## 2020-03-21 NOTE — ASSESSMENT & PLAN NOTE
Hx of fibromyalgia and chronic pain syndromes. HOME med = duloxetine and gabapentin    Plan:  - continue home med

## 2020-03-21 NOTE — HPI
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  Patient states she started developing mid abdominal pain over the past two weeks that has now migrated predominantly to her right flank.   The abdominal pain is intermittent in nature and has been worsening.  She has also developed nausea and vomiting over the past two days to the point that she cannot keep anything down.  She also notes burning with urination over this time period.  She endorses an 8lb weight loss over the last month.  She states that she has a cough productive of light yellow mucus for the past few weeks but denies any runny nose, congestion, muscle aches or fevers.  She denies any melena, blood stools, diarrhea, or constipation.  She denies any recent exposure to COVID-19.     In the ED, patient is afebrile and HD stable.  Labs are remarkable for an elevated WBC to 14k and elevated Cr to 1.9 (baseline around 1.2.)  UA was negative for nitrites or leukocytes but notes 15 WBC.  CT abdomen moderate right-sided hydroureteronephrosis.  No renal stones are visualized.  She was given 2L of NS and 1x dose of IV ceftriaxone.  Urology was consulted in the ED who note no immediate intervention.  Patient admitted to hospital medicine for further work up.      Oncology history per Dr. Quiles:   "She has a history of hysterectomy for cervical cancer in 2015 in [place], [place]. Hyst was for abnormal pap.  Incidental finding of cervical cancer on the hyst specimen.  Incisional biopsy of retroperitoneal periaortic node that shows squamous cell carcinoma consistent with gynecologic primary   Aug 2018: PET scan Right common iliac lymph node suspicious for malignancy.  This could be benign or malignant lymphoproliferative disorder metastatic " "disease.  This is an a risky position for percutaneous biopsy.    Nov 2018: started concurrent chemoradiation.  S/p 4 cycles of cisplatin    Jan 2019: completed "WPRT and 4 cycles of cisplatin. Received only 4 due to electrolyte abnormalities.    Feb 2019:PET: Right common iliac lymph node, shows max SUV of 5.6 on today's exam versus 10.2 on prior exam.  A subcentimeter lymph node is seen in CT in this region.      The previously seen avid left axillary lymph node is no longer visualized is FDG avid.   May 2019: CT CAP: no evidence for disease. (CT done for insurance reason).  Previous imaging was a PET scan"    "

## 2020-03-21 NOTE — TRANSFER OF CARE
"Anesthesia Transfer of Care Note    [facility]    Procedure(s) Performed: Procedure(s) (LRB):  CYSTOSCOPY, WITH RETROGRADE PYELOGRAM, (Bilateral)  TURBT (TRANSURETHRAL RESECTION OF BLADDER TUMOR) (N/A)    Patient location: PACU    Anesthesia Type: general    Transport from OR: Transported from OR on 6-10 L/min O2 by NC with adequate spontaneous ventilation    Post pain: Adequate analgesia    Post assessment: no apparent anesthetic complications    Last vitals:   Visit Vitals  /72 (BP Location: Right arm, Patient Position: Lying)   Pulse 103   Temp 36.5 °C (97.7 °F) (Temporal)   Resp 15   Ht 5' 9" (1.753 m)   Wt 106.6 kg (235 lb)   LMP 06/08/2014 (Approximate)   SpO2 100%   Breastfeeding? No   BMI 34.70 kg/m²       Post vital signs: stable    Level of consciousness: awake and alert     Nausea/Vomiting: no nausea/no vomiting    Complications: none  "

## 2020-03-21 NOTE — ED TRIAGE NOTES
C/o abdominal pain that began last week.  Denies diarrhea/constipation/fever/dysuria. Pain radiates to lower back. Denies dysuria. C/O n/v for last two days.

## 2020-03-21 NOTE — H&P
[facility] Hospital Medicine  History & Physical    Patient Name: Edita Riley  MRN: [id number]  Admission Date: 3/21/2020  Attending Physician: Diomedes Fisher MD   Primary Care Provider: Audrey Mustafa MD    [facility] Medicine Team: [facility] MED 1 Pelon Crews MD     Patient information was obtained from patient, past medical records and ER records.     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  Patient states she started developing mid abdominal pain over the past two weeks that has now migrated predominantly to her right flank.   The abdominal pain is intermittent in nature and has been worsening.  She has also developed nausea and vomiting over the past two days to the point that she cannot keep anything down.  She also notes burning with urination over this time period.  She endorses an 8lb weight loss over the last month.  She states that she has a cough productive of light yellow mucus for the past few weeks but denies any runny nose, congestion, muscle aches or fevers.  She denies any melena, blood stools, diarrhea, or constipation.  She denies any recent exposure to COVID-19.     In the ED, patient is afebrile and HD stable.  Labs are remarkable for an elevated WBC to 14k and elevated Cr to 1.9 (baseline around 1.2.)  UA was negative for nitrites or leukocytes but notes 15 WBC.  CT abdomen moderate right-sided hydroureteronephrosis.  No renal stones are visualized.  She was given 2L of NS and 1x dose of IV ceftriaxone.  Urology was consulted in the ED  "who note no immediate intervention.  Patient admitted to hospital medicine for further work up.      Oncology history per Dr. Quiles:   "She has a history of hysterectomy for cervical cancer in 2015 in [place], [place]. Hyst was for abnormal pap.  Incidental finding of cervical cancer on the hyst specimen.  Incisional biopsy of retroperitoneal periaortic node that shows squamous cell carcinoma consistent with gynecologic primary   Aug 2018: PET scan Right common iliac lymph node suspicious for malignancy.  This could be benign or malignant lymphoproliferative disorder metastatic disease.  This is an a risky position for percutaneous biopsy.    Nov 2018: started concurrent chemoradiation.  S/p 4 cycles of cisplatin    Jan 2019: completed "WPRT and 4 cycles of cisplatin. Received only 4 due to electrolyte abnormalities.    Feb 2019:PET: Right common iliac lymph node, shows max SUV of 5.6 on today's exam versus 10.2 on prior exam.  A subcentimeter lymph node is seen in CT in this region.      The previously seen avid left axillary lymph node is no longer visualized is FDG avid.   May 2019: CT CAP: no evidence for disease. (CT done for insurance reason).  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 drug 11/14/2018    Osteoarthritis of back     Psychiatric problem     Stroke 1972       Past Surgical History:   Procedure Laterality Date    BILATERAL OOPHORECTOMY  2015    CHOLECYSTECTOMY  11/09/2016    Done at [facility], path " showed chronic cholecystitis and gallstones    cold knife conization  2014    COLONOSCOPY  2014    [facility]    COLONOSCOPY N/A 5/18/2018    Procedure: COLONOSCOPY;  Surgeon: Arden Gutiérrez MD;  Location: [facility] (4TH FLR);  Service: Endoscopy;  Laterality: N/A;    ESOPHAGOGASTRODUODENOSCOPY  2014    HYSTERECTOMY  2014    [facility] for cervical cancer    OOPHORECTOMY      RETROPERITONEAL LYMPHADENECTOMY Right 9/17/2018    Procedure: LYMPHADENECTOMY, RETROPERITONEUM;  Surgeon: Sebas Reed MD;  Location: [facility] OR 2ND FLR;  Service: General;  Laterality: Right;  ILIAC       Review of patient's allergies indicates:   Allergen Reactions    Bee sting [allergen ext-venom-honey bee]      Rash      Grass pollen-bermuda, standard      rash       No current facility-administered medications on file prior to encounter.      Current Outpatient Medications on File Prior to Encounter   Medication Sig    cholecalciferol, vitamin D3, 2,000 unit Cap Take 1 capsule by mouth once daily.    DULoxetine (CYMBALTA) 60 MG capsule Take 1 capsule (60 mg total) by mouth once daily.    gabapentin (NEURONTIN) 300 MG capsule Take 1 capsule (300 mg total) by mouth 3 (three) times daily.    lisinopril 10 MG tablet Take 1 tablet (10 mg total) by mouth once daily.    magnesium oxide (MAG-OX) 400 mg (241.3 mg magnesium) tablet Take 1 tablet (400 mg total) by mouth 3 (three) times daily.    meclizine (ANTIVERT) 25 mg tablet Take 1 tablet (25 mg total) by mouth 3 (three) times daily as needed for Dizziness.    potassium chloride SA (K-DUR,KLOR-CON) 20 MEQ tablet Take 20 mEq by mouth once daily.    traZODone (DESYREL) 50 MG tablet TAKE 1 TABLET (50 MG TOTAL) BY MOUTH EVERY EVENING.    omeprazole (PRILOSEC) 40 MG capsule Take 1 capsule (40 mg total) by mouth once daily.     Family History     Problem Relation (Age of Onset)    Breast cancer Maternal Aunt    Cancer Father, Mother    Cervical  cancer Mother    Colon cancer Father    Drug abuse Daughter, Daughter    Esophageal cancer Father    Heart disease Sister, Maternal Grandmother    Suicide Daughter        Tobacco Use    Smoking status: Former Smoker    Smokeless tobacco: Never Used   Substance and Sexual Activity    Alcohol use: No     Alcohol/week: 0.0 standard drinks    Drug use: No    Sexual activity: Yes     Partners: Male     Birth control/protection: None     Comment:  19 years since 1999     Review of Systems   Constitutional: Positive for chills and unexpected weight change (8-9 lb in 1 month). Negative for fever.   HENT: Negative for congestion, rhinorrhea and sore throat.    Respiratory: Positive for cough and shortness of breath (chronic per patient).    Cardiovascular: Negative for chest pain and leg swelling.   Gastrointestinal: Positive for abdominal pain, nausea and vomiting. Negative for abdominal distention and diarrhea.   Genitourinary: Positive for difficulty urinating, dysuria and flank pain. Negative for hematuria.   Musculoskeletal: Negative for arthralgias and back pain.   Skin: Negative for pallor and rash.   Neurological: Negative for weakness and headaches.   Psychiatric/Behavioral: Negative for agitation and confusion.     Objective:     Vital Signs (Most Recent):  Temp: 98 °F (36.7 °C) (03/21/20 1033)  Pulse: 94 (03/21/20 1033)  Resp: 14 (03/21/20 1033)  BP: (!) 143/85 (03/21/20 1033)  SpO2: 98 % (03/21/20 1033) Vital Signs (24h Range):  Temp:  [97.7 °F (36.5 °C)-98 °F (36.7 °C)] 98 °F (36.7 °C)  Pulse:  [] 94  Resp:  [14-18] 14  SpO2:  [96 %-98 %] 98 %  BP: (132-143)/(82-85) 143/85     Weight: 106.6 kg (235 lb)  Body mass index is 34.7 kg/m².    Physical Exam   Constitutional: She is oriented to person, place, and time.   Patient is a pleasant, elderly 54 yo F who appears older than stated, awake, alert and in NAD.    HENT:   Head: Normocephalic and atraumatic.   Eyes: Pupils are equal, round, and  reactive to light. Conjunctivae and EOM are normal.   Neck: Normal range of motion. Neck supple.   Cardiovascular: Normal rate, regular rhythm and normal heart sounds.   No murmur heard.  Pulmonary/Chest: Effort normal and breath sounds normal. No respiratory distress. She has no wheezes.   Abdominal: Soft. Bowel sounds are normal. She exhibits no distension. There is tenderness.   Tenderness noted along BL flanks   Musculoskeletal: Normal range of motion. She exhibits no edema.   Neurological: She is alert and oriented to person, place, and time.   Skin: Skin is warm and dry. Capillary refill takes less than 2 seconds.   Psychiatric: She has a normal mood and affect.         CRANIAL NERVES     CN III, IV, VI   Pupils are equal, round, and reactive to light.  Extraocular motions are normal.     Significant Labs: All pertinent labs within the past 24 hours have been reviewed.     Recent Labs   Lab 03/21/20  0833   WBC 14.36*   HGB 10.5*   HCT 34.2*   *   MCV 89   RDW 15.5*      K 3.5      CO2 23   BUN 18   CREATININE 1.9*   *   PROT 8.0   ALBUMIN 2.6*   BILITOT 0.8   AST 22   ALKPHOS 147*   ALT 20       Significant Imaging: I have reviewed all pertinent imaging results/findings within the past 24 hours.       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  Labs revealed an elevated WBC (14k) and elevated Cr to 1.9 (baseline 1.2).  UA was negative for nitrites or leukocytes but notes 15 WBC.  CT abdomen moderate right-sided hydroureteronephrosis.  No renal stones are visualized.  She was given 2L of NS and 1x dose of IV ceftriaxone.      DDx of ODESSA includes but is not limited to: infection given elevated WBC and  burning with urination, UA with 15 WBCs vs. Pre-renal given hx of increased N/V and poor PO intake vs. Recurrent cancer given hx of unilateral hydroureteronephrosis in the setting of hx of metastatic cervical cancer and recent weight loss.  Also there is no obvious sign of obstructive stone on imaging to indicate a more likely cause of unilateral hydro.     Plan:  - Urology plans to preform cystoscopy today to assess for any obstruction.  Patient may likely require a stent.   - NPO; hold DVT ppx   - continue IV ceftriaxone for UTI   - s/p 2L of NS in ED. Encourage increased PO intake  - strict I/O; monitor fluid output   - Avoid nephrotoxic agents including NSAIDs/ ACEi/ ARBs  - zofran PRN       Severe recurrent major depression without psychotic features  HOME med = Duloxetine 60mg PO     Plan:  -continue home med      Metastatic squamous cell carcinoma to lymph node  History of cervical cancer     Patient with hx of recurrent metastatic SCCA.  She follows with Dr. Lemon (gyn onc) and Dr. Fagan (radiation oncology).   S/p 4 cycles of cisplatin and concurrent chemoradiation in 2018.  - May 2019: CT CAP: no evidence for disease.     Plan:  - consulted gyn onc, appreciate recommendations        Gastroesophageal reflux disease with esophagitis  HOME = omeprazole 40mg PO QD    Plan:  - continue PPI       Fibromyalgia  Hx of fibromyalgia and chronic pain syndromes. HOME med = duloxetine and gabapentin    Plan:  - continue home med       Essential hypertension  Hx of HTN on lisinopril 10mg PO .  Blood pressure appears normotensive on admission.     Plan:  - HOLD ACEi in the setting of an ODESSA        VTE Risk Mitigation (From admission, onward)    None             Pelon Crews MD  Department of Hospital Medicine   [facility]

## 2020-03-21 NOTE — HPI
This is a 55 YOF with history fibromyalgia, major depressive disorder, and cervical cancer status post hysterectomy in 2015 who was then found to have a pelvic lymph node recurrence after evaluation for generalized malaise and abdominal pain. Workup with CT of the abdomen and pelvis on 6/24/2018 revealed an enlarged lymph node at the bifurcation of the right common iliac measuring 2 cm.  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 She also underwent evaluation by Urology including cystoscopy 10/2018 which was negative for any urological origin. She completed definitive chemo XRT on 1/3/19.     She presents to the ER with right flank pain, abdominal pain x 2 days with associated nausea and vomiting.   Here she is AF, VSS  Her Cr is increased at 1.9 from baseline 1.1. WBC 14   CT done shows new R sided hydroureteronephrosis with no obvious cause.   UA not concerning for infection.

## 2020-03-21 NOTE — ANESTHESIA PREPROCEDURE EVALUATION
[facility]  Anesthesia Pre-Operative Evaluation         Patient Name: Edita Riley  YOB: 1965  MRN: [id number]    SUBJECTIVE:     Pre-operative evaluation for Procedure(s) (LRB):  CYSTOSCOPY, WITH RETROGRADE PYELOGRAM, possible stent insertion (Right)     03/21/2020    Edita Riley is a 55 y.o. 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  Labs revealed an elevated WBC (14k) and elevated Cr to 1.9 (baseline 1.2).  UA was negative for nitrites or leukocytes but notes 15 WBC.  CT abdomen moderate right-sided hydroureteronephrosis.    Pt last meal was about 5pm last night. No recent respiratory illness. But she did say her difficulty swallowing, johny w solid food had worsened over the last 2 weeks    Patient now presents for the above procedure(s).      LDA:        Peripheral IV - Single Lumen 03/21/20 0815 20 G Left Forearm (Active)   Site Assessment Clean;Dry;Intact 3/21/2020  8:15 AM   Line Status Blood return noted;Flushed;Saline locked 3/21/2020  8:15 AM   Dressing Intervention First dressing 3/21/2020  8:15 AM   Number of days: 0       Prev airway:   Airway (Primary) Present Prior to Hospital Arrival?: No; Placement Date: 09/17/18; Placement Time: 1330; Method of Intubation: Direct laryngoscopy; Inserted by: Anesthesia Resident; Airway Device: Endotracheal Tube; Mask Ventilation: Easy; Intubated: Postinduction; Blade: Patricia #3; Airway Device Size: 7.0; Style: Cuffed; Cuff Inflation: Minimal occlusive pressure; Placement Verified By:  Auscultation, Capnometry, ETT Condensation; Grade: Grade III (Only arytenoids seen); Complicating Factors: Obesity, Oropharyngeal edema or fat, Anterior larynx; Intubation Findings: Positive EtCO2, Bilateral breath sounds, Atraumatic/Condition of teeth unchanged;  Depth of Insertion: 21; Securment: Lips; Complications: None; Breath Sounds: Equal Bilateral; Insertion Attempts: 1; Removal Date: 09/17/18;  Removal Time: 1504         Drips: None documented.      Patient Active Problem List   Diagnosis    Osteoarthritis of back    Hiatal hernia    Essential hypertension    Lower extremity pain, diffuse    Weakness of both lower extremities    Impaired functional mobility, balance, gait, and endurance    Schatzki's ring    Colon polyp    Internal hemorrhoids    Cardiovascular event risk -- low    Hemifacial spasm    Severe episode of recurrent major depressive disorder, with psychotic features    Fibromyalgia    Facial palsy    Blood in stool    Sleep stage dysfunction    Carpal tunnel syndrome on right    Weakness    Difficulty walking    Gastroesophageal reflux disease with esophagitis    Abnormal CT of the abdomen    Lymphadenopathy    History of cervical cancer    Metastatic squamous cell carcinoma to lymph node    Severe recurrent major depression without psychotic features    Generalized anxiety disorder    PTSD (post-traumatic stress disorder)    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 drug    Diarrhea due to malabsorption    Seizure-like activity    Stroke    Electrolyte disorder    ODESSA (acute kidney injury)       Review of patient's allergies indicates:   Allergen Reactions    Bee sting [allergen ext-venom-honey bee]      Rash      Grass pollen-bermuda, standard      rash       Current Inpatient Medications:   [START ON 3/22/2020] cefTRIAXone (ROCEPHIN) IVPB  1 g Intravenous Q24H    DULoxetine  60 mg Oral Daily    gabapentin  100 mg Oral TID    traZODone  50 mg Oral QHS       No  current facility-administered medications on file prior to encounter.      Current Outpatient Medications on File Prior to Encounter   Medication Sig Dispense Refill    cholecalciferol, vitamin D3, 2,000 unit Cap Take 1 capsule by mouth once daily.      DULoxetine (CYMBALTA) 60 MG capsule Take 1 capsule (60 mg total) by mouth once daily. 90 capsule 3    gabapentin (NEURONTIN) 300 MG capsule Take 1 capsule (300 mg total) by mouth 3 (three) times daily. 270 capsule 3    lisinopril 10 MG tablet Take 1 tablet (10 mg total) by mouth once daily. 90 tablet 3    magnesium oxide (MAG-OX) 400 mg (241.3 mg magnesium) tablet Take 1 tablet (400 mg total) by mouth 3 (three) times daily. 90 tablet 1    meclizine (ANTIVERT) 25 mg tablet Take 1 tablet (25 mg total) by mouth 3 (three) times daily as needed for Dizziness. 30 tablet 6    potassium chloride SA (K-DUR,KLOR-CON) 20 MEQ tablet Take 20 mEq by mouth once daily.  1    traZODone (DESYREL) 50 MG tablet TAKE 1 TABLET (50 MG TOTAL) BY MOUTH EVERY EVENING. 30 tablet 11    omeprazole (PRILOSEC) 40 MG capsule Take 1 capsule (40 mg total) by mouth once daily. 90 capsule 3       Past Surgical History:   Procedure Laterality Date    BILATERAL OOPHORECTOMY  2015    CHOLECYSTECTOMY  11/09/2016    Done at [facility], path showed chronic cholecystitis and gallstones    cold knife conization  2014    COLONOSCOPY  2014    [facility]    COLONOSCOPY N/A 5/18/2018    Procedure: COLONOSCOPY;  Surgeon: Arden Gutiérrez MD;  Location: [facility] (4TH FLR);  Service: Endoscopy;  Laterality: N/A;    ESOPHAGOGASTRODUODENOSCOPY  2014    [facility]ST[facility]CTOMY  2014    [facility] for [facility]    OOPHORECTOMY      RETROPERITONEAL LYMPHADENECTOMY Right 9/17/2018    Procedure: LYMPHADENECTOMY, RETROPERITONEUM;  Surgeon: Sebas Reed MD;  [facility];  Service: General;  Laterality: Right;  ILIAC       Social History     Socioeconomic  History    Marital status:      Spouse name: Hammad    Number of children: 2    Years of education: Not on file    Highest education level: Not on file   Occupational History    Not on file   Social Needs    Financial resource strain: Not on file    Food insecurity:     Worry: Not on file     Inability: Not on file    Transportation needs:     Medical: Not on file     Non-medical: Not on file   Tobacco Use    Smoking status: Former Smoker    Smokeless tobacco: Never Used   Substance and Sexual Activity    Alcohol use: No     Alcohol/week: 0.0 standard drinks    Drug use: No    Sexual activity: Yes     Partners: Male     Birth control/protection: None     Comment:  19 years since    Lifestyle    Physical activity:     Days per week: Not on file     Minutes per session: Not on file    Stress: Not on file   Relationships    Social connections:     Talks on phone: Not on file     Gets together: Not on file     Attends Anglican service: Not on file     Active member of club or organization: Not on file     Attends meetings of clubs or organizations: Not on file     Relationship status: Not on file   Other Topics Concern    Patient feels they ought to cut down on drinking/drug use Not Asked    Patient annoyed by others criticizing their drinking/drug use Not Asked    Patient has felt bad or guilty about drinking/drug use Not Asked    Patient has had a drink/used drugs as an eye opener in the AM Not Asked   Social History Narrative    , twin daughters (1  2018), disabled due to childhood stroke, Confucianism sophia       OBJECTIVE:     Vital Signs Range (Last 24H):  Temp:  [36.5 °C (97.7 °F)-36.7 °C (98 °F)]   Pulse:  []   Resp:  [14-18]   BP: (132-143)/(82-85)   SpO2:  [96 %-98 %]       Significant Labs:  Lab Results   Component Value Date    WBC 14.36 (H) 2020    HGB 10.5 (L) 2020    HCT 34.2 (L) 2020     (H) 2020    CHOL  174 12/02/2018    TRIG 145 12/02/2018    HDL 69 12/02/2018    ALT 20 03/21/2020    AST 22 03/21/2020     03/21/2020    K 3.5 03/21/2020     03/21/2020    CREATININE 1.9 (H) 03/21/2020    BUN 18 03/21/2020    CO2 23 03/21/2020    TSH 0.527 12/02/2018    INR 1.0 12/02/2018    GLUF 94 09/20/2016    HGBA1C 5.6 11/16/2017       Diagnostic Studies: No relevant studies.    EKG:   Results for orders placed or performed during the hospital encounter of 05/29/19   EKG 12-lead (Chest Pain) Age >30    Collection Time: 05/29/19 12:35 PM    Narrative    Test Reason : R07.9,    Vent. Rate : 094 BPM     Atrial Rate : 094 BPM     P-R Int : 144 ms          QRS Dur : 078 ms      QT Int : 364 ms       P-R-T Axes : 035 050 040 degrees     QTc Int : 455 ms    Normal sinus rhythm  Possible Left atrial enlargement  Otherwise normal ECG    When compared with ECG of 17-APR-2019 10:36,  Possible  Left atrial enlargement  is new  Confirmed by PAULINA RAMIREZ MD ([id number]) on 5/29/2019 1:05:09 PM    Referred By:             Confirmed By:PAULINA RAMIREZ MD       2D ECHO:  TTE:  No results found for this or any previous visit.    VALERIA:  No results found for this or any previous visit.    ASSESSMENT/PLAN:       Anesthesia Evaluation    I have reviewed the Patient Summary Reports.     I have reviewed the Medications.     Review of Systems  Anesthesia Hx:  No problems with previous Anesthesia  History of prior surgery of interest to airway management or planning: Previous anesthesia: General Denies Family Hx of Anesthesia complications.   Denies Personal Hx of Anesthesia complications.   Cardiovascular:   Hypertension    Renal/:   Chronic Renal Disease    Hepatic/GI:   Hiatal Hernia, GERD    Musculoskeletal:   Arthritis     Neurological:   CVA Neuromuscular Disease,    Psych:   Psychiatric History          Physical Exam  General:  Well nourished, Obesity    Airway/Jaw/Neck:  Airway Findings: Mouth Opening: Normal Tongue: Large  General Airway  Assessment: Adult  Mallampati: IV  Improves to III with phonation.  TM Distance: 4 - 6 cm  Jaw/Neck Findings:  Neck ROM: Normal ROM     Eyes/Ears/Nose:  EYES/EARS/NOSE FINDINGS: Normal   Dental:  Dental Findings:    Chest/Lungs:  Chest/Lungs Findings: Clear to auscultation, Normal Respiratory Rate     Heart/Vascular:  Heart Findings: Rate: Normal  Rhythm: Regular Rhythm  Sounds: Normal  Vascular Findings:  Vascular Access: Peripheral IV(s)     Abdomen:  Abdomen Findings:  Soft, Tenderness  Right flank tenderness   Musculoskeletal:  Musculoskeletal Findings:    Skin:  Skin Findings:     Mental Status:  Mental Status Findings:  Cooperative, Alert and Oriented         Anesthesia Plan  Type of Anesthesia, risks & benefits discussed:  Anesthesia Type:  general, MAC  Patient's Preference:   Intra-op Monitoring Plan: standard ASA monitors  Intra-op Monitoring Plan Comments:   Post Op Pain Control Plan: per primary service following discharge from PACU, IV/PO Opioids PRN and multimodal analgesia  Post Op Pain Control Plan Comments:   Induction:   IV  Beta Blocker:  Patient is not currently on a Beta-Blocker (No further documentation required).       Informed Consent: Patient understands risks and agrees with Anesthesia plan.  Questions answered. Anesthesia consent signed with patient.  ASA Score: 3     Day of Surgery Review of History & Physical:            Ready For Surgery From Anesthesia Perspective.

## 2020-03-21 NOTE — ED PROVIDER NOTES
Encounter Date: 3/21/2020    SCRIBE #1 NOTE: I, Rufus Fitch, am scribing for, and in the presence of,  Dr. Li. I have scribed the following portions of the note - Other sections scribed: HPI, ROS, PE, MDM.       History     Chief Complaint   Patient presents with    Abdominal Pain     started last week, vomiting for 2 days     Edita Riley is a 55 y.o. female who has a past medical history of Anxiety, Cervical cancer, diarrhea due to malabsorption, Fibromyalgia, GERD, Hypertension, Hypomagnesemia, Lactose intolerance, and Stroke. Patient presents with 2 days of mid abdomen discomfort with associated nausea and vomiting. Denies fever or diarrhea. She has these symptoms periodically. These started gradually and have been getting worse. Denies any exposure to coronavirus even though this is a endemic area. Review of symptoms otherwise negative.     The history is provided by the patient and medical records.     Review of patient's allergies indicates:   Allergen Reactions    Bee sting [allergen ext-venom-honey bee]      Rash      Grass pollen-bermuda, standard      rash     Past Medical History:   Diagnosis Date    Anxiety     Cervical cancer 2014    Chronic back pain     Depression     Diarrhea due to malabsorption 11/14/2018    Fibromyalgia     GERD (gastroesophageal reflux disease)     Hiatal hernia 2014    History of cervical cancer 10/11/2018    Hx of psychiatric care     Cymbalta, trazodone    Hypertension     Hypomagnesemia 11/21/2018    Lactose intolerance     Metastatic squamous cell carcinoma to lymph node 10/11/2018    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 drug 11/14/2018    Osteoarthritis of back     Psychiatric problem     Stroke 1972     Past Surgical History:   Procedure Laterality Date    BILATERAL OOPHORECTOMY  2015    CHOLECYSTECTOMY  11/09/2016    Done at [facility], path showed chronic cholecystitis and gallstones    cold knife conization  2014    COLONOSCOPY   2014    COLONOSCOPY N/A 10/24/2016    at [facility], Dr Thomas    COLONOSCOPY N/A 5/18/2018    Procedure: COLONOSCOPY;  Surgeon: Arden Gutiérrez MD;  Location: [facility] (4TH FLR);  Service: Endoscopy;  Laterality: N/A;    ESOPHAGOGASTRODUODENOSCOPY  2014    HYSTERECTOMY  2014    TVH for cervical cancer    OOPHORECTOMY      RETROPERITONEAL LYMPHADENECTOMY Right 9/17/2018    Procedure: LYMPHADENECTOMY, RETROPERITONEUM;  Surgeon: Sebas Reed MD;  Location: [facility] OR 2ND FLR;  Service: General;  Laterality: Right;  ILIAC     Family History   Problem Relation Age of Onset    Heart disease Sister     Heart disease Maternal Grandmother     Colon cancer Father     Esophageal cancer Father     Cancer Father         Lung-smoker    Cancer Mother         Cervical    Cervical cancer Mother     Breast cancer Maternal Aunt     Suicide Daughter         jumped from parking structure    Drug abuse Daughter     Drug abuse Daughter     Rectal cancer Neg Hx     Stomach cancer Neg Hx     Crohn's disease Neg Hx     Ulcerative colitis Neg Hx     Diabetes Neg Hx     Hypertension Neg Hx      Social History     Tobacco Use    Smoking status: Former Smoker    Smokeless tobacco: Never Used   Substance Use Topics    Alcohol use: No     Alcohol/week: 0.0 standard drinks    Drug use: No     Review of Systems   Constitutional: Negative for fever.   HENT: Negative for sore throat and trouble swallowing.    Eyes: Negative for pain and redness.   Respiratory: Negative for cough and shortness of breath.    Cardiovascular: Negative for chest pain and palpitations.   Gastrointestinal: Positive for abdominal pain, nausea and vomiting. Negative for diarrhea.   Genitourinary: Negative for dysuria and frequency.   Musculoskeletal: Negative for back pain and neck pain.   Skin: Negative for rash and wound.   Neurological: Negative for light-headedness and headaches.   Psychiatric/Behavioral: Negative for behavioral problems and  confusion.       Physical Exam     Initial Vitals [03/21/20 0744]   BP Pulse Resp Temp SpO2   132/82 104 18 97.7 °F (36.5 °C) 96 %      MAP       --         Physical Exam    Nursing note and vitals reviewed.  Constitutional: She appears well-developed and well-nourished. She is not diaphoretic. No distress.   Dehydrated   HENT:   Head: Normocephalic and atraumatic.   Eyes: EOM are normal. Pupils are equal, round, and reactive to light.   Neck: Neck supple.   Cardiovascular: Normal rate, regular rhythm, normal heart sounds and intact distal pulses.   Pulmonary/Chest: Breath sounds normal. No respiratory distress. She has no wheezes. She has no rhonchi. She has no rales.   Abdominal: Soft. There is tenderness (diffuse). There is no rebound and no guarding.   Neurological: She is alert and oriented to person, place, and time.   Skin: Skin is warm and dry. Capillary refill takes less than 2 seconds.         ED Course   Procedures  Labs Reviewed   CBC W/ AUTO DIFFERENTIAL - Abnormal; Notable for the following components:       Result Value    WBC 14.36 (*)     RBC 3.86 (*)     Hemoglobin 10.5 (*)     Hematocrit 34.2 (*)     Mean Corpuscular Hemoglobin Conc 30.7 (*)     RDW 15.5 (*)     Platelets 356 (*)     Immature Granulocytes 1.0 (*)     Gran # (ANC) 11.0 (*)     Immature Grans (Abs) 0.15 (*)     Mono # 1.6 (*)     Gran% 76.3 (*)     Lymph% 9.5 (*)     All other components within normal limits   COMPREHENSIVE METABOLIC PANEL - Abnormal; Notable for the following components:    Glucose 177 (*)     Creatinine 1.9 (*)     Albumin 2.6 (*)     Alkaline Phosphatase 147 (*)     eGFR if  33.3 (*)     eGFR if non  28.9 (*)     All other components within normal limits   URINALYSIS, REFLEX TO URINE CULTURE - Abnormal; Notable for the following components:    Appearance, UA Cloudy (*)     Protein, UA 2+ (*)     Occult Blood UA 1+ (*)     All other components within normal limits    Narrative:      Preferred Collection Type->Urine, Clean Catch   URINALYSIS MICROSCOPIC - Abnormal; Notable for the following components:    RBC, UA 7 (*)     WBC, UA 15 (*)     All other components within normal limits    Narrative:     Preferred Collection Type->Urine, Clean Catch   CULTURE, URINE   CULTURE, BLOOD   CULTURE, BLOOD   LIPASE   SPECIMEN TO PATHOLOGY, SURGERY   POCT GLUCOSE MONITORING CONTINUOUS   POCT GLUCOSE MONITORING CONTINUOUS          Imaging Results          SURG FL Surgery Fluoro Usage (In process)                CT Abdomen Pelvis  Without Contrast (Final result)  Result time 03/21/20 10:53:22    Final result by Fermín Jacobson MD (03/21/20 10:53:22)                 Impression:      Interval development of moderate right-sided hydroureteronephrosis.  No renal stones are visualized.  Given patient's history of hysterectomy and right common iliac lymph node resection secondary to metastatic cervical cancer, findings raise the concern for recurrence although no obvious mass is visualized.    Probable hepatic steatosis.      Electronically signed by: Fermín Jacobson MD  Date:    03/21/2020  Time:    10:53             Narrative:    EXAMINATION:  CT ABDOMEN PELVIS WITHOUT CONTRAST    CLINICAL HISTORY:  Abdominal pain, unspecified;    TECHNIQUE:  Low dose axial images, sagittal and coronal reformations were obtained from the lung bases to the pubic symphysis, Oral contrast was not administered.    COMPARISON:  CT abdomen pelvis from 11/26/2019    FINDINGS:  Heart: Normal in size. No pericardial effusion.    Lung Bases: Well aerated, without consolidation or pleural fluid.    Liver: Normal in size.  Diffuse hypoattenuation of the liver suggestive of hepatic steatosis.  No focal hepatic lesions.    Gallbladder: Surgically absent.    Bile Ducts: No evidence of dilated ducts.    Pancreas: No mass or peripancreatic fat stranding.    Spleen: Unremarkable.    Adrenals: Unremarkable.    Kidneys/ Ureters: Interval  development of moderate right-sided hydronephrosis and hydroureter with adjacent fat stranding/inflammatory changes.  No stone is visualized.  Given patient's history of hysterectomy and right common iliac lymph node resection secondary to metastatic cervical carcinoma, underlying malignancy is not excluded although no obstructing mass is visualized.  Findings may be secondary to postsurgical changes, however no evidence of hydronephrosis was visualized on exam from 11/26/2019.  Left kidney is normal in appearance.    Bladder: Urinary bladder is decompressed and not well evaluated.    Reproductive organs: Uterus is surgically absent.    GI Tract/Mesentery: No evidence of bowel obstruction or inflammation.    Peritoneal Space: No ascites. No free air.    Retroperitoneum: No significant adenopathy.    Abdominal wall: Unremarkable.    Vasculature: No significant atherosclerosis or aneurysm.    Bones: No acute fracture.                                 Medical Decision Making:   History:   Old Medical Records: I decided to obtain old medical records.  Initial Assessment:   Patient with abdominal pain and vomiting with signs of dehydration. Will check labs, obtain CT abdomen, administer IV fluids, and administer zofran. This does not appear to be an infectious process so will not isolate. Does not appear to be a cardiac case based on symptoms.   Independently Interpreted Test(s):   I have ordered and independently interpreted X-rays - see prior notes.  Clinical Tests:   Lab Tests: Ordered and Reviewed  Radiological Study: Ordered and Reviewed  ED Management:  11:48 AM  Infected urine, ODESSA, and hydronephrosis. No stone present. Will discuss with urology and internal medicine.     Discussed case with Urology.  They will evaluate in the ED. Recommended admission to Internal Medicine  Discussed with Medicine admit team.  They will notify the attending            Scribe Attestation:   Scribe #1: I performed the above scribed  service and the documentation accurately describes the services I performed. I attest to the accuracy of the note.            ED Course as of Mar 21 1849   Sat Mar 21, 2020   1159 Creatinine(!): 1.9 [ES]   1200 Creatinine(!): 1.9 [ES]      ED Course User Index  [ES] Jian Li MD                Clinical Impression:       ICD-10-CM ICD-9-CM   1. ODESSA (acute kidney injury) N17.9 584.9   2. Urinary tract infection without hematuria, site unspecified N39.0 599.0   3. Hydronephrosis, unspecified hydronephrosis type N13.30 591   4. Hydronephrosis of right kidney N13.30 591         Disposition:   Disposition: Admitted  Condition: Stable     ED Disposition Condition    Admit                           Jian Li MD  03/21/20 1232       Jian Li MD  03/21/20 1232       Jian Li MD  03/21/20 1842

## 2020-03-21 NOTE — SUBJECTIVE & OBJECTIVE
Past Medical History:   Diagnosis Date    Anxiety     Cervical cancer 2014    Chronic back pain     Depression     Diarrhea due to malabsorption 11/14/2018    Fibromyalgia     GERD (gastroesophageal reflux disease)     Hiatal hernia 2014    History of cervical cancer 10/11/2018    Hx of psychiatric care     Cymbalta, trazodone    Hypertension     Hypomagnesemia 11/21/2018    Lactose intolerance     Metastatic squamous cell carcinoma to lymph node 10/11/2018    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 drug 11/14/2018    Osteoarthritis of back     Psychiatric problem     Stroke 1972       Past Surgical History:   Procedure Laterality Date    BILATERAL OOPHORECTOMY  2015    CHOLECYSTECTOMY  11/09/2016    Done at [facility], path showed chronic cholecystitis and gallstones    cold knife conization  2014    COLONOSCOPY  2014    [facility]    COLONOSCOPY N/A 5/18/2018    Procedure: COLONOSCOPY;  Surgeon: Arden Gutiérrez MD;  [facility]  Service: Endoscopy;  Laterality: N/A;    ESOPHAGOGASTRODUODENOSCOPY  2014    HYSTERECTOMY  2014    TVH for cervical cancer    OOPHORECTOMY      RETROPERITONEAL LYMPHADENECTOMY Right 9/17/2018    Procedure: LYMPHADENECTOMY, RETROPERITONEUM;  Surgeon: Sebas Reed MD;  Location: [facility] OR 2ND FLR;  Service: General;  Laterality: Right;  ILIAC       Review of patient's allergies indicates:   Allergen Reactions    Bee sting [allergen ext-venom-honey bee]      Rash      Grass pollen-bermuda, standard      rash       No current facility-administered medications on file prior to encounter.      Current Outpatient Medications on File Prior to Encounter   Medication Sig    cholecalciferol, vitamin D3, 2,000 unit Cap Take 1 capsule by mouth once daily.    DULoxetine (CYMBALTA) 60 MG capsule Take 1 capsule (60 mg total) by mouth once daily.    gabapentin (NEURONTIN) 300 MG capsule Take 1 capsule (300 mg total)  by mouth 3 (three) times daily.    lisinopril 10 MG tablet Take 1 tablet (10 mg total) by mouth once daily.    magnesium oxide (MAG-OX) 400 mg (241.3 mg magnesium) tablet Take 1 tablet (400 mg total) by mouth 3 (three) times daily.    meclizine (ANTIVERT) 25 mg tablet Take 1 tablet (25 mg total) by mouth 3 (three) times daily as needed for Dizziness.    potassium chloride SA (K-DUR,KLOR-CON) 20 MEQ tablet Take 20 mEq by mouth once daily.    traZODone (DESYREL) 50 MG tablet TAKE 1 TABLET (50 MG TOTAL) BY MOUTH EVERY EVENING.    omeprazole (PRILOSEC) 40 MG capsule Take 1 capsule (40 mg total) by mouth once daily.     Family History     Problem Relation (Age of Onset)    Breast cancer Maternal Aunt    Cancer Father, Mother    Cervical cancer Mother    Colon cancer Father    Drug abuse Daughter, Daughter    Esophageal cancer Father    Heart disease Sister, Maternal Grandmother    Suicide Daughter        Tobacco Use    Smoking status: Former Smoker    Smokeless tobacco: Never Used   Substance and Sexual Activity    Alcohol use: No     Alcohol/week: 0.0 standard drinks    Drug use: No    Sexual activity: Yes     Partners: Male     Birth control/protection: None     Comment:  19 years since 1999     Review of Systems   Constitutional: Positive for chills and unexpected weight change (8-9 lb in 1 month). Negative for fever.   HENT: Negative for congestion, rhinorrhea and sore throat.    Respiratory: Positive for cough and shortness of breath (chronic per patient).    Cardiovascular: Negative for chest pain and leg swelling.   Gastrointestinal: Positive for abdominal pain, nausea and vomiting. Negative for abdominal distention and diarrhea.   Genitourinary: Positive for difficulty urinating, dysuria and flank pain. Negative for hematuria.   Musculoskeletal: Negative for arthralgias and back pain.   Skin: Negative for pallor and rash.   Neurological: Negative for weakness and headaches.    Psychiatric/Behavioral: Negative for agitation and confusion.     Objective:     Vital Signs (Most Recent):  Temp: 98 °F (36.7 °C) (03/21/20 1033)  Pulse: 94 (03/21/20 1033)  Resp: 14 (03/21/20 1033)  BP: (!) 143/85 (03/21/20 1033)  SpO2: 98 % (03/21/20 1033) Vital Signs (24h Range):  Temp:  [97.7 °F (36.5 °C)-98 °F (36.7 °C)] 98 °F (36.7 °C)  Pulse:  [] 94  Resp:  [14-18] 14  SpO2:  [96 %-98 %] 98 %  BP: (132-143)/(82-85) 143/85     Weight: 106.6 kg (235 lb)  Body mass index is 34.7 kg/m².    Physical Exam   Constitutional: She is oriented to person, place, and time.   Patient is a pleasant, elderly 54 yo F who appears older than stated, awake, alert and in NAD.    HENT:   Head: Normocephalic and atraumatic.   Eyes: Pupils are equal, round, and reactive to light. Conjunctivae and EOM are normal.   Neck: Normal range of motion. Neck supple.   Cardiovascular: Normal rate, regular rhythm and normal heart sounds.   No murmur heard.  Pulmonary/Chest: Effort normal and breath sounds normal. No respiratory distress. She has no wheezes.   Abdominal: Soft. Bowel sounds are normal. She exhibits no distension. There is tenderness.   Tenderness noted along BL flanks   Musculoskeletal: Normal range of motion. She exhibits no edema.   Neurological: She is alert and oriented to person, place, and time.   Skin: Skin is warm and dry. Capillary refill takes less than 2 seconds.   Psychiatric: She has a normal mood and affect.         CRANIAL NERVES     CN III, IV, VI   Pupils are equal, round, and reactive to light.  Extraocular motions are normal.     Significant Labs: All pertinent labs within the past 24 hours have been reviewed.     Recent Labs   Lab 03/21/20  0833   WBC 14.36*   HGB 10.5*   HCT 34.2*   *   MCV 89   RDW 15.5*      K 3.5      CO2 23   BUN 18   CREATININE 1.9*   *   PROT 8.0   ALBUMIN 2.6*   BILITOT 0.8   AST 22   ALKPHOS 147*   ALT 20       Significant Imaging: I have reviewed  all pertinent imaging results/findings within the past 24 hours.

## 2020-03-21 NOTE — ED NOTES
LOC: The patient is awake and alert; oriented x 3 and speaking appropriately.  APPEARANCE: Patient resting comfortably, patient is clean and well groomed  SKIN: warm and dry, normal skin turgor & moist mucus membranes, skin intact, no breakdown noted.  MUSCULOSKELETAL: Patient moving all extremities well, no obvious swelling or deformities noted, c/o lower back pain  RESPIRATORY: Airway is open and patent, respirations are spontaneous, normal effort and rate  CARDIAC: Patient has a normal rate, no peripheral edema noted, capillary refill < 3 seconds; No complaints of chest pain   ABDOMEN: Soft and non tender to palpation, no distention noted. Last BM last night. Denies d/constipation or dysuria.  Pain radiates to lower back - onset 1 week. C/O n/b x 2 days

## 2020-03-21 NOTE — CONSULTS
[facility]  Urology  Consult Note    Patient Name: Edita Riley  MRN: [id number]  Admission Date: 3/21/2020  Hospital Length of Stay: 0   Code Status: Full Code   Attending Provider: Diomedes Fisher MD   Consulting Provider: Mana Wilks MD  Primary Care Physician: Audrey Mustafa MD  Principal Problem:ODESSA (acute kidney injury)    Inpatient consult to Urology  Consult performed by: Mana Wilks MD  Consult ordered by: Jian Li MD          Subjective:     HPI:  This is a 55 YOF with history fibromyalgia, major depressive disorder, and cervical cancer status post hysterectomy in 2015 who was then found to have a pelvic lymph node recurrence after evaluation for generalized malaise and abdominal pain. Workup with CT of the abdomen and pelvis on 6/24/2018 revealed an enlarged lymph node at the bifurcation of the right common iliac measuring 2 cm.  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 She also underwent evaluation by Urology including cystoscopy 10/2018 which was negative for any urological origin. She completed definitive chemo XRT on 1/3/19.     She presents to the ER with right flank pain, abdominal pain x 2 days with associated nausea and vomiting.   Here she is AF, VSS  Her Cr is increased at 1.9 from baseline 1.1. WBC 14   CT done shows new R sided hydroureteronephrosis with no obvious cause.   UA not concerning for infection.     Past Medical History:   Diagnosis Date    Anxiety     Cervical cancer 2014    Chronic back pain     Depression     Diarrhea due to malabsorption 11/14/2018    Fibromyalgia     GERD (gastroesophageal reflux disease)     Hiatal hernia 2014    History of cervical cancer 10/11/2018    Hx of psychiatric care     Cymbalta, trazodone    Hypertension     Hypomagnesemia 11/21/2018    Lactose intolerance     Metastatic  squamous cell carcinoma to lymph node 10/11/2018    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 drug 11/14/2018    Osteoarthritis of back     Psychiatric problem     Stroke 1972       Past Surgical History:   Procedure Laterality Date    BILATERAL OOPHORECTOMY  2015    CHOLECYSTECTOMY  11/09/2016    Done at [facility], path showed chronic cholecystitis and gallstones    cold knife conization  2014    COLONOSCOPY  2014    [facility]    COLONOSCOPY N/A 5/18/2018    Procedure: COLONOSCOPY;  Surgeon: Arden Gutiérrez MD;  Location: [facility] (4TH FLR);  Service: Endoscopy;  Laterality: N/A;    ESOPHAGOGASTRODUODENOSCOPY  2014    HYSTERECTOMY  2014    [facility] for cervical cancer    OOPHORECTOMY      RETROPERITONEAL LYMPHADENECTOMY Right 9/17/2018    Procedure: LYMPHADENECTOMY, RETROPERITONEUM;  Surgeon: Sebas Reed MD;  Location: [facility] OR 2ND FLR;  Service: General;  Laterality: Right;  ILIAC       Review of patient's allergies indicates:   Allergen Reactions    Bee sting [allergen ext-venom-honey bee]      Rash      Grass pollen-bermuda, standard      rash       Family History     Problem Relation (Age of Onset)    Breast cancer Maternal Aunt    Cancer Father, Mother    Cervical cancer Mother    Colon cancer Father    Drug abuse Daughter, Daughter    Esophageal cancer Father    Heart disease Sister, Maternal Grandmother    Suicide Daughter          Tobacco Use    Smoking status: Former Smoker    Smokeless tobacco: Never Used   Substance and Sexual Activity    Alcohol use: No     Alcohol/week: 0.0 standard drinks    Drug use: No    Sexual activity: Yes     Partners: Male     Birth control/protection: None     Comment:  19 years since 1999       Review of Systems   Constitutional: Negative for activity change, appetite change, fatigue and fever.   HENT: Negative for facial swelling and hearing loss.    Eyes: Negative for discharge and itching.   Respiratory:  Negative for chest tightness and shortness of breath.    Cardiovascular: Negative for chest pain and leg swelling.   Gastrointestinal: Negative for abdominal distention, abdominal pain, constipation, diarrhea, nausea and vomiting.   Genitourinary: Positive for dysuria and flank pain (R). Negative for decreased urine volume, difficulty urinating, frequency, hematuria and urgency.   Musculoskeletal: Negative for arthralgias, back pain and neck pain.   Skin: Negative for color change and pallor.   Neurological: Negative for dizziness, facial asymmetry and light-headedness.   Hematological: Negative for adenopathy.   Psychiatric/Behavioral: Negative for agitation and decreased concentration. The patient is not nervous/anxious.        Objective:     Temp:  [97.7 °F (36.5 °C)-98 °F (36.7 °C)] 98 °F (36.7 °C)  Pulse:  [] 94  Resp:  [14-18] 14  SpO2:  [96 %-98 %] 98 %  BP: (132-143)/(82-85) 143/85     Body mass index is 34.7 kg/m².           Drains     None                 Physical Exam   Constitutional: She is oriented to person, place, and time. She appears well-developed and well-nourished. No distress.   HENT:   Head: Normocephalic and atraumatic.   Eyes: No scleral icterus.   Neck: No tracheal deviation present.   Cardiovascular: Normal rate.    Pulmonary/Chest: Effort normal. No respiratory distress.   Abdominal: Soft. She exhibits no distension. There is no tenderness.   Genitourinary:   Genitourinary Comments: R>L flank tenderness   Musculoskeletal: Normal range of motion.   Neurological: She is alert and oriented to person, place, and time.   Skin: Skin is warm and dry.     Psychiatric: She has a normal mood and affect. Her behavior is normal.       Significant Labs:    BMP:  Recent Labs   Lab 03/21/20  0833      K 3.5      CO2 23   BUN 18   CREATININE 1.9*   CALCIUM 9.4       CBC:  Recent Labs   Lab 03/21/20  0833   WBC 14.36*   HGB 10.5*   HCT 34.2*   *       All pertinent labs results  from the past 24 hours have been reviewed.    Significant Imaging:  All pertinent imaging results/findings from the past 24 hours have been reviewed.  R hydroureteronephrosis down to bladder, no stones  L kidney and ureter unremarkable                  Assessment and Plan:     Hydronephrosis of right kidney  -- To OR today for cystoscopy and right retrograde pyelogram  -- please keep NPO for procedure  -- consent obtained  -- patient is currently admitted to medicine            VTE Risk Mitigation (From admission, onward)    None          Thank you for your consult. I will follow-up with patient. Please contact us if you have any additional questions.    Mana Wilks MD  Urology  [facility]    Patient was seen and the CT scan reviewed.  Agree with the above note.  Will take for urgent stent placement.

## 2020-03-21 NOTE — SUBJECTIVE & OBJECTIVE
Past Medical History:   Diagnosis Date    Anxiety     Cervical cancer 2014    Chronic back pain     Depression     Diarrhea due to malabsorption 11/14/2018    Fibromyalgia     GERD (gastroesophageal reflux disease)     Hiatal hernia 2014    History of cervical cancer 10/11/2018    Hx of psychiatric care     Cymbalta, trazodone    Hypertension     Hypomagnesemia 11/21/2018    Lactose intolerance     Metastatic squamous cell carcinoma to lymph node 10/11/2018    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 drug 11/14/2018    Osteoarthritis of back     Psychiatric problem     Stroke 1972       Past Surgical History:   Procedure Laterality Date    BILATERAL OOPHORECTOMY  2015    CHOLECYSTECTOMY  11/09/2016    Done at [facility], path showed chronic cholecystitis and gallstones    cold knife conization  2014    COLONOSCOPY  2014    COLONOSCOPY N/A 10/24/2016    at [facility] Dr Gutiérrez    COLONOSCOPY N/A 5/18/2018    Procedure: COLONOSCOPY;  Surgeon: Arden Gutiérrez MD;  Location: [facility] (4TH FLR);  Service: Endoscopy;  Laterality: N/A;    ESOPHAGOGASTRODUODENOSCOPY  2014    HYSTERECTOMY  2014    [facility] for cervical cancer    OOPHORECTOMY      RETROPERITONEAL LYMPHADENECTOMY Right 9/17/2018    Procedure: LYMPHADENECTOMY, RETROPERITONEUM;  Surgeon: Sebas Reed MD;  Location: [facility] OR 2ND FLR;  Service: General;  Laterality: Right;  ILIAC       Review of patient's allergies indicates:   Allergen Reactions    Bee sting [allergen ext-venom-honey bee]      Rash      Grass pollen-bermuda, standard      rash       Family History     Problem Relation (Age of Onset)    Breast cancer Maternal Aunt    Cancer Father, Mother    Cervical cancer Mother    Colon cancer Father    Drug abuse Daughter, Daughter    Esophageal cancer Father    Heart disease Sister, Maternal Grandmother    Suicide Daughter          Tobacco Use    Smoking status: Former Smoker    Smokeless tobacco: Never Used   Substance and Sexual  Activity    Alcohol use: No     Alcohol/week: 0.0 standard drinks    Drug use: No    Sexual activity: Yes     Partners: Male     Birth control/protection: None     Comment:  19 years since 1999       Review of Systems   Constitutional: Negative for activity change, appetite change, fatigue and fever.   HENT: Negative for facial swelling and hearing loss.    Eyes: Negative for discharge and itching.   Respiratory: Negative for chest tightness and shortness of breath.    Cardiovascular: Negative for chest pain and leg swelling.   Gastrointestinal: Negative for abdominal distention, abdominal pain, constipation, diarrhea, nausea and vomiting.   Genitourinary: Positive for dysuria and flank pain (R). Negative for decreased urine volume, difficulty urinating, frequency, hematuria and urgency.   Musculoskeletal: Negative for arthralgias, back pain and neck pain.   Skin: Negative for color change and pallor.   Neurological: Negative for dizziness, facial asymmetry and light-headedness.   Hematological: Negative for adenopathy.   Psychiatric/Behavioral: Negative for agitation and decreased concentration. The patient is not nervous/anxious.        Objective:     Temp:  [97.7 °F (36.5 °C)-98 °F (36.7 °C)] 98 °F (36.7 °C)  Pulse:  [] 94  Resp:  [14-18] 14  SpO2:  [96 %-98 %] 98 %  BP: (132-143)/(82-85) 143/85     Body mass index is 34.7 kg/m².           Drains     None                 Physical Exam   Constitutional: She is oriented to person, place, and time. She appears well-developed and well-nourished. No distress.   HENT:   Head: Normocephalic and atraumatic.   Eyes: No scleral icterus.   Neck: No tracheal deviation present.   Cardiovascular: Normal rate.    Pulmonary/Chest: Effort normal. No respiratory distress.   Abdominal: Soft. She exhibits no distension. There is no tenderness.   Genitourinary:   Genitourinary Comments: R>L flank tenderness   Musculoskeletal: Normal range of motion.   Neurological:  She is alert and oriented to person, place, and time.   Skin: Skin is warm and dry.     Psychiatric: She has a normal mood and affect. Her behavior is normal.       Significant Labs:    BMP:  Recent Labs   Lab 03/21/20  0833      K 3.5      CO2 23   BUN 18   CREATININE 1.9*   CALCIUM 9.4       CBC:  Recent Labs   Lab 03/21/20  0833   WBC 14.36*   HGB 10.5*   HCT 34.2*   *       All pertinent labs results from the past 24 hours have been reviewed.    Significant Imaging:  All pertinent imaging results/findings from the past 24 hours have been reviewed.  R hydroureteronephrosis down to bladder, no stones  L kidney and ureter unremarkable

## 2020-03-21 NOTE — ASSESSMENT & PLAN NOTE
History of cervical cancer     Patient with hx of recurrent metastatic SCCA.  She follows with Dr. Lemon (gyn onc) and Dr. Fagan (radiation oncology).   S/p 4 cycles of cisplatin and concurrent chemoradiation in 2018.  - May 2019: CT CAP: no evidence for disease.     Plan:  - consulted gyn onc, appreciate recommendations

## 2020-03-21 NOTE — ASSESSMENT & PLAN NOTE
-- To OR today for cystoscopy and right retrograde pyelogram  -- please keep NPO for procedure  -- consent obtained  -- patient is currently admitted to medicine

## 2020-03-21 NOTE — ASSESSMENT & PLAN NOTE
Hx of HTN on lisinopril 10mg PO .  Blood pressure appears normotensive on admission.     Plan:  - HOLD ACEi in the setting of an ODESSA

## 2020-03-21 NOTE — ASSESSMENT & PLAN NOTE
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  Labs revealed an elevated WBC (14k) and elevated Cr to 1.9 (baseline 1.2).  UA was negative for nitrites or leukocytes but notes 15 WBC.  CT abdomen moderate right-sided hydroureteronephrosis.  No renal stones are visualized.  She was given 2L of NS and 1x dose of IV ceftriaxone.      DDx of ODESSA includes but is not limited to: infection given elevated WBC and burning with urination vs pre-renal given hx of increased N/V and poor PO intake vs. Recurrent cancer given hx of unilateral hydroureteronephrosis in the setting of hx of metastatic cervical cancer and recent weight loss.  Also there is no obvious sign of obstructive stone on imaging to indicate a more likely cause of unilateral hydro.     Plan:  - Urology plans to preform cystoscopy today to assess for any obstruction.  Patient may likely require a stent.   - NPO; hold DVT ppx   - continue IV ceftriaxone for UTI   - s/p 2L of NS in ED. Encourage increased PO intake  - strict I/O; monitor fluid output   - Avoid nephrotoxic agents including NSAIDs/ ACEi/ ARBs  - zofran PRN

## 2020-03-21 NOTE — ANESTHESIA POSTPROCEDURE EVALUATION
Anesthesia Post Evaluation    [facility]    Procedure(s) Performed: Procedure(s) (LRB):  CYSTOSCOPY, WITH RETROGRADE PYELOGRAM, (Bilateral)  TURBT (TRANSURETHRAL RESECTION OF BLADDER TUMOR) (N/A)    Final Anesthesia Type: general    Patient location during evaluation: PACU  Patient participation: Yes- Able to Participate  Level of consciousness: awake  Post-procedure vital signs: reviewed and stable  Pain management: adequate  Airway patency: patent    PONV status at discharge: No PONV  Anesthetic complications: no      Cardiovascular status: blood pressure returned to baseline  Respiratory status: unassisted  Hydration status: euvolemic  Follow-up not needed.          Vitals Value Taken Time   /72 3/21/2020  5:45 PM   Temp 36.5 °C (97.7 °F) 3/21/2020  5:43 PM   Pulse 93 3/21/2020  5:59 PM   Resp 14 3/21/2020  5:59 PM   SpO2 100 % 3/21/2020  5:59 PM   Vitals shown include unvalidated device data.      No case tracking events are documented in the log.      Pain/Caitie Score: Pain Rating Prior to Med Admin: 6 (3/21/2020  5:55 PM)  Caitie Score: 8 (3/21/2020  5:50 PM)

## 2020-03-22 VITALS
DIASTOLIC BLOOD PRESSURE: 71 MMHG | HEIGHT: 69 IN | OXYGEN SATURATION: 95 % | WEIGHT: 237 LBS | RESPIRATION RATE: 18 BRPM | SYSTOLIC BLOOD PRESSURE: 123 MMHG | BODY MASS INDEX: 35.1 KG/M2 | TEMPERATURE: 96 F | HEART RATE: 87 BPM

## 2020-03-22 LAB
ALBUMIN SERPL BCP-MCNC: 2.1 G/DL (ref 3.5–5.2)
ALP SERPL-CCNC: 198 U/L (ref 55–135)
ALT SERPL W/O P-5'-P-CCNC: 33 U/L (ref 10–44)
ANION GAP SERPL CALC-SCNC: 8 MMOL/L (ref 8–16)
AST SERPL-CCNC: 37 U/L (ref 10–40)
BASOPHILS # BLD AUTO: 0.02 K/UL (ref 0–0.2)
BASOPHILS NFR BLD: 0.2 % (ref 0–1.9)
BILIRUB SERPL-MCNC: 0.6 MG/DL (ref 0.1–1)
BUN SERPL-MCNC: 20 MG/DL (ref 6–20)
CALCIUM SERPL-MCNC: 8.8 MG/DL (ref 8.7–10.5)
CHLORIDE SERPL-SCNC: 107 MMOL/L (ref 95–110)
CO2 SERPL-SCNC: 25 MMOL/L (ref 23–29)
CREAT SERPL-MCNC: 1.4 MG/DL (ref 0.5–1.4)
DIFFERENTIAL METHOD: ABNORMAL
EOSINOPHIL # BLD AUTO: 0 K/UL (ref 0–0.5)
EOSINOPHIL NFR BLD: 0.3 % (ref 0–8)
ERYTHROCYTE [DISTWIDTH] IN BLOOD BY AUTOMATED COUNT: 15.9 % (ref 11.5–14.5)
EST. GFR  (AFRICAN AMERICAN): 48.1 ML/MIN/1.73 M^2
EST. GFR  (NON AFRICAN AMERICAN): 41.7 ML/MIN/1.73 M^2
GLUCOSE SERPL-MCNC: 133 MG/DL (ref 70–110)
HCT VFR BLD AUTO: 34.1 % (ref 37–48.5)
HGB BLD-MCNC: 10.2 G/DL (ref 12–16)
IMM GRANULOCYTES # BLD AUTO: 0.09 K/UL (ref 0–0.04)
IMM GRANULOCYTES NFR BLD AUTO: 0.8 % (ref 0–0.5)
LYMPHOCYTES # BLD AUTO: 1 K/UL (ref 1–4.8)
LYMPHOCYTES NFR BLD: 8.7 % (ref 18–48)
MAGNESIUM SERPL-MCNC: 2.2 MG/DL (ref 1.6–2.6)
MCH RBC QN AUTO: 27.5 PG (ref 27–31)
MCHC RBC AUTO-ENTMCNC: 29.9 G/DL (ref 32–36)
MCV RBC AUTO: 92 FL (ref 82–98)
MONOCYTES # BLD AUTO: 0.8 K/UL (ref 0.3–1)
MONOCYTES NFR BLD: 7.1 % (ref 4–15)
NEUTROPHILS # BLD AUTO: 9.4 K/UL (ref 1.8–7.7)
NEUTROPHILS NFR BLD: 82.9 % (ref 38–73)
NRBC BLD-RTO: 0 /100 WBC
PHOSPHATE SERPL-MCNC: 4.6 MG/DL (ref 2.7–4.5)
PLATELET # BLD AUTO: 365 K/UL (ref 150–350)
PMV BLD AUTO: 10.8 FL (ref 9.2–12.9)
POTASSIUM SERPL-SCNC: 4.5 MMOL/L (ref 3.5–5.1)
PROT SERPL-MCNC: 7 G/DL (ref 6–8.4)
RBC # BLD AUTO: 3.71 M/UL (ref 4–5.4)
SODIUM SERPL-SCNC: 140 MMOL/L (ref 136–145)
WBC # BLD AUTO: 11.33 K/UL (ref 3.9–12.7)

## 2020-03-22 PROCEDURE — 97165 OT EVAL LOW COMPLEX 30 MIN: CPT

## 2020-03-22 PROCEDURE — 84100 ASSAY OF PHOSPHORUS: CPT

## 2020-03-22 PROCEDURE — 99238 PR HOSPITAL DISCHARGE DAY,<30 MIN: ICD-10-PCS | Mod: ,,, | Performed by: INTERNAL MEDICINE

## 2020-03-22 PROCEDURE — 85025 COMPLETE CBC W/AUTO DIFF WBC: CPT

## 2020-03-22 PROCEDURE — 25000003 PHARM REV CODE 250: Performed by: STUDENT IN AN ORGANIZED HEALTH CARE EDUCATION/TRAINING PROGRAM

## 2020-03-22 PROCEDURE — 97535 SELF CARE MNGMENT TRAINING: CPT

## 2020-03-22 PROCEDURE — 63600175 PHARM REV CODE 636 W HCPCS: Performed by: STUDENT IN AN ORGANIZED HEALTH CARE EDUCATION/TRAINING PROGRAM

## 2020-03-22 PROCEDURE — 99238 HOSP IP/OBS DSCHRG MGMT 30/<: CPT | Mod: ,,, | Performed by: INTERNAL MEDICINE

## 2020-03-22 PROCEDURE — 97116 GAIT TRAINING THERAPY: CPT

## 2020-03-22 PROCEDURE — 80053 COMPREHEN METABOLIC PANEL: CPT

## 2020-03-22 PROCEDURE — 36415 COLL VENOUS BLD VENIPUNCTURE: CPT

## 2020-03-22 PROCEDURE — 83735 ASSAY OF MAGNESIUM: CPT

## 2020-03-22 PROCEDURE — 97161 PT EVAL LOW COMPLEX 20 MIN: CPT

## 2020-03-22 RX ORDER — OXYCODONE HYDROCHLORIDE 5 MG/1
5 TABLET ORAL EVERY 6 HOURS PRN
Status: DISCONTINUED | OUTPATIENT
Start: 2020-03-22 | End: 2020-03-22 | Stop reason: HOSPADM

## 2020-03-22 RX ORDER — TAMSULOSIN HYDROCHLORIDE 0.4 MG/1
0.4 CAPSULE ORAL DAILY
Qty: 30 CAPSULE | Refills: 11 | Status: SHIPPED | OUTPATIENT
Start: 2020-03-23 | End: 2020-05-04

## 2020-03-22 RX ORDER — LEVOFLOXACIN 750 MG/1
750 TABLET ORAL DAILY
Qty: 5 TABLET | Refills: 0 | Status: SHIPPED | OUTPATIENT
Start: 2020-03-23 | End: 2020-03-26 | Stop reason: ALTCHOICE

## 2020-03-22 RX ADMIN — TAMSULOSIN HYDROCHLORIDE 0.4 MG: 0.4 CAPSULE ORAL at 08:03

## 2020-03-22 RX ADMIN — OXYCODONE HYDROCHLORIDE 5 MG: 5 TABLET ORAL at 12:03

## 2020-03-22 RX ADMIN — GABAPENTIN 100 MG: 100 CAPSULE ORAL at 08:03

## 2020-03-22 RX ADMIN — CEFTRIAXONE SODIUM 1 G: 1 INJECTION, POWDER, FOR SOLUTION INTRAMUSCULAR; INTRAVENOUS at 12:03

## 2020-03-22 RX ADMIN — OXYBUTYNIN CHLORIDE 5 MG: 5 TABLET ORAL at 08:03

## 2020-03-22 RX ADMIN — ACETAMINOPHEN 650 MG: 325 TABLET ORAL at 10:03

## 2020-03-22 RX ADMIN — DULOXETINE 60 MG: 60 CAPSULE, DELAYED RELEASE ORAL at 09:03

## 2020-03-22 NOTE — ASSESSMENT & PLAN NOTE
- Noted per CT imaging on intake to the ED. Confirmed with retrograde pyelogram in OR per Urology  - Etiology unknown yet fibrosis secondary to pelvic radiation vs. Mass effect secondary to recurrent disease are on differential. While mass effect is possible no evidence of mass of most current or recent CT scans.  - S/P BL ureteral stents per urology who is following

## 2020-03-22 NOTE — DISCHARGE SUMMARY
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  Patient states she started developing mid abdominal pain over the past two weeks that has now migrated predominantly to her right flank.   The abdominal pain is intermittent in nature and has been worsening.  She has also developed nausea and vomiting over the past two days to the point that she cannot keep anything down.  She also notes burning with urination over this time period.  She endorses an 8lb weight loss over the last month.  She states that she has a cough productive of light yellow mucus for the past few weeks but denies any runny nose, congestion, muscle aches or fevers.  She denies any melena, blood stools, diarrhea, or constipation.  She denies any recent exposure to COVID-19.     In the ED, patient is afebrile and HD stable.  Labs are remarkable for an elevated WBC to 14k and elevated Cr to 1.9 (baseline around 1.2.)  UA was negative for nitrites or leukocytes but notes 15 WBC.  CT abdomen moderate right-sided hydroureteronephrosis.  No renal stones are visualized.  She was given 2L of NS and 1x dose of IV ceftriaxone.  Urology was consulted in the ED who note no immediate intervention.  Patient admitted to hospital medicine for further work up.      Oncology history per Dr. Quiles:   "She has a history of " "hysterectomy for cervical cancer in 2015 in [place], [place]. Hyst was for abnormal pap.  Incidental finding of cervical cancer on the hyst specimen.  Incisional biopsy of retroperitoneal periaortic node that shows squamous cell carcinoma consistent with gynecologic primary   Aug 2018: PET scan Right common iliac lymph node suspicious for malignancy.  This could be benign or malignant lymphoproliferative disorder metastatic disease.  This is an a risky position for percutaneous biopsy.    Nov 2018: started concurrent chemoradiation.  S/p 4 cycles of cisplatin    Jan 2019: completed "WPRT and 4 cycles of cisplatin. Received only 4 due to electrolyte abnormalities.    Feb 2019:PET: Right common iliac lymph node, shows max SUV of 5.6 on today's exam versus 10.2 on prior exam.  A subcentimeter lymph node is seen in CT in this region.      The previously seen avid left axillary lymph node is no longer visualized is FDG avid.   May 2019: CT CAP: no evidence for disease. (CT done for insurance reason).  Previous imaging was a PET scan"      Procedure(s) (LRB):  CYSTOSCOPY, WITH RETROGRADE PYELOGRAM, (Bilateral)  TURBT (TRANSURETHRAL RESECTION OF BLADDER TUMOR) (N/A)      Hospital Course:   Patient admitted due to possible pyelonephritis in the setting of hydronephrosis that is kidney stone negative on CT and an ODESSA.  Patient started on IV ceftriaxone.  Urology was consulted and preformed a cystoscopy and noted heaped up tissue concerning for bladder tumor versus radiation cystitis.    S/p BL stent placement and TURBT.  Patient's WBC is down trending and her Cr has improved.  Patient's nino catheter was removed and she passed a voiding trial.  Her pain has also improved, and she has remained afebrile.  Patielnt will need folow up in 2 weeks with Dr Quiles to review biopsy taken during the procedure.  Will discharge patient on antibiotics to complete a 5 day course of Levaquin.  Continue flomax on discharge. Follow up with " Urology outpatient.      Interval History: Patient states that her pain has improved this morning since arrival at the hospital.  CORNELIUS.  Patient's nino was removed this AM.      Review of Systems   Constitutional: Negative for chills and fever.   Respiratory: Negative for cough and shortness of breath.    Cardiovascular: Negative for chest pain and leg swelling.   Gastrointestinal: Negative for abdominal distention, abdominal pain, diarrhea, nausea and vomiting.   Genitourinary: Negative for difficulty urinating and dysuria.   Musculoskeletal: Negative for arthralgias and back pain.   Skin: Negative for pallor and rash.   Psychiatric/Behavioral: Negative for agitation and confusion.     Objective:     Vital Signs (Most Recent):  Temp: 96 °F (35.6 °C) (03/22/20 0806)  Pulse: 86 (03/22/20 0806)  Resp: 16 (03/22/20 0806)  BP: 117/68 (03/22/20 0806)  SpO2: 96 % (03/22/20 0806) Vital Signs (24h Range):  Temp:  [96 °F (35.6 °C)-98.4 °F (36.9 °C)] 96 °F (35.6 °C)  Pulse:  [] 86  Resp:  [12-16] 16  SpO2:  [95 %-100 %] 96 %  BP: (105-143)/(58-85) 117/68     Weight: 106.6 kg (235 lb)  Body mass index is 34.7 kg/m².    Intake/Output Summary (Last 24 hours) at 3/22/2020 0938  Last data filed at 3/22/2020 0428  Gross per 24 hour   Intake --   Output 700 ml   Net -700 ml      Physical Exam   Constitutional: She is oriented to person, place, and time.   Patient is a pleasant, elderly 56 yo F who appears older than stated, awake, alert and in NAD.    HENT:   Head: Normocephalic and atraumatic.   Eyes: Pupils are equal, round, and reactive to light. Conjunctivae and EOM are normal.   Neck: Normal range of motion. Neck supple.   Cardiovascular: Normal rate, regular rhythm and normal heart sounds.   No murmur heard.  Pulmonary/Chest: Effort normal and breath sounds normal. No respiratory distress. She has no wheezes.   Abdominal: Soft. Bowel sounds are normal. She exhibits no distension. There is no tenderness.    Musculoskeletal: Normal range of motion. She exhibits no edema.   Neurological: She is alert and oriented to person, place, and time.   Skin: Skin is warm and dry. Capillary refill takes less than 2 seconds.   Psychiatric: She has a normal mood and affect.     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  Labs revealed an elevated WBC (14k) and elevated Cr to 1.9 (baseline 1.2).  UA was negative for nitrites or leukocytes but notes 15 WBC.  CT abdomen moderate right-sided hydroureteronephrosis.  No renal stones are visualized.  She was given 2L of NS and 1x dose of IV ceftriaxone.      DDx of ODESSA includes but is not limited to: infection given elevated WBC and burning with urination vs pre-renal given hx of increased N/V and poor PO intake vs. Recurrent cancer given hx of unilateral hydroureteronephrosis in the setting of hx of metastatic cervical cancer and recent weight loss.  Also there is no obvious sign of obstructive stone on imaging to indicate a more likely cause of unilateral hydro.     Plan:  - continue Levofloxacin PO to complete a 7 day course  - Urology was consulted and preformed a cystoscopy and noted heaped up tissue concerning for bladder tumor versus radiation cystitis.     - S/p BL stent placement and TURBT.  - Biopsy taken during the procedure.  Follow up in Gyn Onc clinic.   - s/p 2L of NS in ED. Encourage  increased PO intake  - strict I/O; monitor fluid output   - Avoid nephrotoxic agents including NSAIDs/ ACEi/ ARBs  - zofran PRN         ODESSA (acute kidney injury)  See above      Metastatic squamous cell carcinoma to lymph node  History of cervical cancer     Patient with hx of recurrent metastatic SCCA.  She follows with Dr. Lemon (gyn onc) and Dr. Fagan (radiation oncology).   S/p 4 cycles of cisplatin and concurrent chemoradiation in 2018.  - May 2019: CT CAP: no evidence for disease.     Plan:  - consulted gyn onc, appreciate recommendations   - f/u outpatient for results of biopsy        Gastroesophageal reflux disease with esophagitis  HOME = omeprazole 40mg PO QD    Plan:  - continue PPI       Fibromyalgia  Hx of fibromyalgia and chronic pain syndromes. HOME med = duloxetine and gabapentin    Plan:  - continue home med       Essential hypertension  Hx of HTN on lisinopril 10mg PO .  Blood pressure appears normotensive on admission.     Plan:  - continue home med on discharge        Final Active Diagnoses:    Diagnosis Date Noted POA    PRINCIPAL PROBLEM:  Pyelonephritis of right kidney [N12] 03/21/2020 Yes    ODESSA (acute kidney injury) [N17.9] 03/21/2020 Yes    Hydronephrosis of right kidney [N13.30] 03/21/2020 Yes    History of cervical cancer [Z85.41] 10/11/2018 Not Applicable     Chronic    Metastatic squamous cell carcinoma to lymph node [C77.9] 10/11/2018 Yes     Chronic    Gastroesophageal reflux disease with esophagitis [K21.0] 11/16/2017 Yes     Chronic    Severe episode of recurrent major depressive disorder, with psychotic features [F33.3] 09/16/2015 Yes     Chronic    Fibromyalgia [M79.7] 09/16/2015 Yes     Chronic    Impaired functional mobility, balance, gait, and endurance [Z74.09] 07/24/2015 Yes     Chronic    Essential hypertension [I10] 05/04/2015 Yes     Chronic    Osteoarthritis of back [M47.815] 05/04/2015 Yes     Chronic      Problems Resolved During this Admission:        Discharged Condition: fair    Disposition: Home-Health Care [facility]    Follow Up:  Follow-up Information     [facility].    Specialty:  Urology  Contact information:  [address]  [facility] [id number] [phone]           [facility] - GYN Oncology.    Specialty:  Gynecologic Oncology  Contact information:  [address]  [facility] [id number] [phone]  Additional information:  [facility], 2nd Floor.                                                            **If you're coming for Chemotherapy class, please go to the 3rd Floor**           Audrey Mustafa MD.    Specialty:  Internal Medicine  Contact information:  [address]  [facility] [address]  [phone]                 Patient Instructions:   No discharge procedures on file.    Significant Diagnostic Studies: Labs:   CMP   Recent Labs   Lab 03/21/20  0833 03/22/20  0430    140   K 3.5 4.5    107   CO2 23 25   * 133*   BUN 18 20   CREATININE 1.9* 1.4   CALCIUM 9.4 8.8   PROT 8.0 7.0   ALBUMIN 2.6* 2.1*   BILITOT 0.8 0.6   ALKPHOS 147* 198*   AST 22 37   ALT 20 33   ANIONGAP 10 8   ESTGFRAFRICA 33.3* 48.1*   EGFRNONAA 28.9* 41.7*    and CBC   Recent Labs   Lab 03/21/20  0833 03/22/20  0430   WBC 14.36* 11.33   HGB 10.5* 10.2*   HCT 34.2* 34.1*   * 365*       Pending Diagnostic Studies:     Procedure Component Value Units Date/Time    SURG FL Surgery Fluoro Usage [[id number]] Resulted:  03/21/20 1533    Order Status:  Sent Lab Status:  In process Updated:  03/21/20 1533    Specimen to Pathology, Surgery Urology [[id number]] Collected:  03/21/20 1732    Order Status:  Sent Lab Status:  In process Updated:  03/21/20 1732         Medications:  Reconciled Home Medications:      Medication List      START taking these medications    levoFLOXacin 750 MG tablet  Commonly known as:  LEVAQUIN  Take 1 tablet (750 mg total) by mouth once daily. for 5 days  Start taking on:  March 23, 2020      tamsulosin 0.4 mg Cap  Commonly known as:  FLOMAX  Take 1 capsule (0.4 mg total) by mouth once daily.  Start taking on:  March 23, 2020        CONTINUE taking these medications    cholecalciferol (vitamin D3) 50 mcg (2,000 unit) Cap  Commonly known as:  VITAMIN D3  Take 1 capsule by mouth once daily.     DULoxetine 60 MG capsule  Commonly known as:  CYMBALTA  Take 1 capsule (60 mg total) by mouth once daily.     gabapentin 300 MG capsule  Commonly known as:  NEURONTIN  Take 1 capsule (300 mg total) by mouth 3 (three) times daily.     lisinopriL 10 MG tablet  Take 1 tablet (10 mg total) by mouth once daily.     magnesium oxide 400 mg (241.3 mg magnesium) tablet  Commonly known as:  MAG-OX  Take 1 tablet (400 mg total) by mouth 3 (three) times daily.     meclizine 25 mg tablet  Commonly known as:  ANTIVERT  Take 1 tablet (25 mg total) by mouth 3 (three) times daily as needed for Dizziness.     omeprazole 40 MG capsule  Commonly known as:  PRILOSEC  Take 1 capsule (40 mg total) by mouth once daily.     potassium chloride SA 20 MEQ tablet  Commonly known as:  K-DUR,KLOR-CON  Take 20 mEq by mouth once daily.     traZODone 50 MG tablet  Commonly known as:  DESYREL  TAKE 1 TABLET (50 MG TOTAL) BY MOUTH EVERY EVENING.            Indwelling Lines/Drains at time of discharge:   Lines/Drains/Airways     None                 Time spent on the discharge of patient: 35 minutes  Patient was seen and examined on the date of discharge and determined to be suitable for discharge.         Pelon Crews MD  Department of Hospital Medicine  [facility]

## 2020-03-22 NOTE — NURSING
Reviewed discharge instructions with patient, discussed medications ordered and how to take them, IV removed, spoke with [facility] and when transport brings patient to front of hospital for  to  will notify pharmacy so they can be ready with medications at [facility].  Patient will call  for  .  Patient verbalized understanding of discharge instructions.

## 2020-03-22 NOTE — PLAN OF CARE
[facility]    HOME HEALTH ORDERS  FACE TO FACE ENCOUNTER    Patient Name: Edita Riley  YOB: 1965    PCP: Audrey Mustafa MD   [facility] [address]  PCP Phone Number: [phone]  PCP Fax: [phone]    Encounter Date: 03/22/2020    Admit to Home Health    Diagnoses:  Active Hospital Problems    Diagnosis  POA    *Pyelonephritis of right kidney [N12]  Yes    ODESSA (acute kidney injury) [N17.9]  Yes    Hydronephrosis of right kidney [N13.30]  Yes    History of cervical cancer [Z85.41]  Not Applicable     Chronic    Metastatic squamous cell carcinoma to lymph node [C77.9]  Yes     Chronic    Gastroesophageal reflux disease with esophagitis [K21.0]  Yes     Chronic    Severe episode of recurrent major depressive disorder, with psychotic features [F33.3]  Yes     Chronic     Patient with 5 year history of depression, currently at its worst       Fibromyalgia [M79.7]  Yes     Chronic    Impaired functional mobility, balance, gait, and endurance [Z74.09]  Yes     Chronic    Essential hypertension [I10]  Yes     Chronic    Osteoarthritis of back [M47.815]  Yes     Chronic      Resolved Hospital Problems   No resolved problems to display.       Future Appointments   Date Time Provider Department Center   6/4/2020 10:00 AM Refugio Lemon MD [facility] GYN ONC [facility]     Follow-up Information     [facility].    Specialty:  Urology  Contact information[facility]  [facility] [address]  [phone]           [facility] - GYN Oncology.    Specialty:  Gynecologic Oncology  Contact information:  [address] [facility] [id number] [phone]  Additional information:  [facility], 2nd Floor.                                                            **If you're coming for Chemotherapy class, please go to the 3rd Floor**           Audrey Mustafa MD.    Specialty:  Internal Medicine  Contact  information:  [address]  [facility] [address]  [phone]                     I have seen and examined this patient face to face today. My clinical findings that support the need for the home health skilled services and home bound status are the following:  Weakness/numbness causing balance and gait disturbance due to Weakness/Debility making it taxing to leave home.    Allergies:  Review of patient's allergies indicates:   Allergen Reactions    Bee sting [allergen ext-venom-honey bee]      Rash      Grass pollen-bermuda, standard      rash       Diet: regular diet    Activities: activity as tolerated    Nursing:   SN to complete comprehensive assessment including routine vital signs. Instruct on disease process and s/s of complications to report to MD. Review/verify medication list sent home with the patient at time of discharge  and instruct patient/caregiver as needed. Frequency may be adjusted depending on start of care date.    Notify MD if SBP > 160 or < 90; DBP > 90 or < 50; HR > 120 or < 50; Temp > 101;       CONSULTS:    Physical Therapy to evaluate and treat. Evaluate for home safety and equipment needs; Establish/upgrade home exercise program. Perform / instruct on therapeutic exercises, gait training, transfer training, and Range of Motion.  Occupational Therapy to evaluate and treat. Evaluate home environment for safety and equipment needs. Perform/Instruct on transfers, ADL training, ROM, and therapeutic exercises.    MISCELLANEOUS CARE:  N/A    WOUND CARE ORDERS  n/a      Medications: Review discharge medications with patient and family and provide education.      Current Discharge Medication List      START taking these medications    Details   levoFLOXacin (LEVAQUIN) 750 MG tablet Take 1 tablet (750 mg total) by mouth once daily. for 5 days  Qty: 5 tablet, Refills: 0    Comments: Please deliver to bedside      tamsulosin (FLOMAX) 0.4 mg Cap Take 1 capsule (0.4 mg total) by mouth once  daily.  Qty: 30 capsule, Refills: 11    Comments: Please deliver to bedside         CONTINUE these medications which have NOT CHANGED    Details   cholecalciferol, vitamin D3, 2,000 unit Cap Take 1 capsule by mouth once daily.      DULoxetine (CYMBALTA) 60 MG capsule Take 1 capsule (60 mg total) by mouth once daily.  Qty: 90 capsule, Refills: 3      gabapentin (NEURONTIN) 300 MG capsule Take 1 capsule (300 mg total) by mouth 3 (three) times daily.  Qty: 270 capsule, Refills: 3      lisinopril 10 MG tablet Take 1 tablet (10 mg total) by mouth once daily.  Qty: 90 tablet, Refills: 3    Associated Diagnoses: Hypertension, unspecified type      magnesium oxide (MAG-OX) 400 mg (241.3 mg magnesium) tablet Take 1 tablet (400 mg total) by mouth 3 (three) times daily.  Qty: 90 tablet, Refills: 1    Associated Diagnoses: Hypomagnesemia      meclizine (ANTIVERT) 25 mg tablet Take 1 tablet (25 mg total) by mouth 3 (three) times daily as needed for Dizziness.  Qty: 30 tablet, Refills: 6    Associated Diagnoses: Vertigo      potassium chloride SA (K-DUR,KLOR-CON) 20 MEQ tablet Take 20 mEq by mouth once daily.  Refills: 1      traZODone (DESYREL) 50 MG tablet TAKE 1 TABLET (50 MG TOTAL) BY MOUTH EVERY EVENING.  Qty: 30 tablet, Refills: 11    Associated Diagnoses: Primary insomnia      omeprazole (PRILOSEC) 40 MG capsule Take 1 capsule (40 mg total) by mouth once daily.  Qty: 90 capsule, Refills: 3    Associated Diagnoses: Gastroesophageal reflux disease with esophagitis             I certify that this patient is confined to her home and needs physical therapy and occupational therapy.

## 2020-03-22 NOTE — HOSPITAL COURSE
Patient admitted due to possible pyelonephritis in the setting of hydronephrosis that is kidney stone negative on CT and an ODESSA.  Patient started on IV ceftriaxone.  Urology was consulted and preformed a cystoscopy and noted heaped up tissue concerning for bladder tumor versus radiation cystitis.    S/p BL stent placement and TURBT.  Patient's WBC is down trending and her Cr has improved.  Patient's nino catheter was removed and she passed a voiding trial.  Her pain has also improved, and she has remained afebrile.  Patielnt will need folow up in 2 weeks with Dr Quiles to review biopsy taken during the procedure.  Will discharge patient on antibiotics to complete a 5 day course of Levaquin.  Continue flomax on discharge. Follow up with Urology outpatient.

## 2020-03-22 NOTE — SUBJECTIVE & OBJECTIVE
Past Medical History:   Diagnosis Date    Anxiety     Cervical cancer 2014    Chronic back pain     Depression     Diarrhea due to malabsorption 11/14/2018    Fibromyalgia     GERD (gastroesophageal reflux disease)     Hiatal hernia 2014    History of cervical cancer 10/11/2018    Hx of psychiatric care     Cymbalta, trazodone    Hypertension     Hypomagnesemia 11/21/2018    Lactose intolerance     Metastatic squamous cell carcinoma to lymph node 10/11/2018    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 drug 11/14/2018    Osteoarthritis of back     Psychiatric problem     Stroke 1972     Past Surgical History:   Procedure Laterality Date    BILATERAL OOPHORECTOMY  2015    CHOLECYSTECTOMY  11/09/2016    Done at [facility], path showed chronic cholecystitis and gallstones    cold knife conization  2014    COLONOSCOPY  2014    COLONOSCOPY N/A 10/24/2016    at [facility], Dr Gutiérrez    COLONOSCOPY N/A 5/18/2018    Procedure: COLONOSCOPY;  Surgeon: Arden Gutiérrez MD;  Location: [facility] (4TH FLR);  Service: Endoscopy;  Laterality: N/A;    ESOPHAGOGASTRODUODENOSCOPY  2014    HYSTERECTOMY  2014    TVH for cervical cancer    OOPHORECTOMY      RETROPERITONEAL LYMPHADENECTOMY Right 9/17/2018    Procedure: LYMPHADENECTOMY, RETROPERITONEUM;  Surgeon: Sebas Reed MD;  [facility];  Service: General;  Laterality: Right;  ILIAC     Family History     Problem Relation (Age of Onset)    Breast cancer Maternal Aunt    Cancer Father, Mother    Cervical cancer Mother    Colon cancer Father    Drug abuse Daughter, Daughter    Esophageal cancer Father    Heart disease Sister, Maternal Grandmother    Suicide Daughter        Tobacco Use    Smoking status: Former Smoker    Smokeless tobacco: Never Used   Substance and Sexual Activity    Alcohol use: No     Alcohol/week: 0.0 standard drinks    Drug use: No    Sexual activity: Yes     Partners: Male     Birth control/protection: None     Comment:   19 years since 1999       PTA Medications   Medication Sig    cholecalciferol, vitamin D3, 2,000 unit Cap Take 1 capsule by mouth once daily.    DULoxetine (CYMBALTA) 60 MG capsule Take 1 capsule (60 mg total) by mouth once daily.    gabapentin (NEURONTIN) 300 MG capsule Take 1 capsule (300 mg total) by mouth 3 (three) times daily.    lisinopril 10 MG tablet Take 1 tablet (10 mg total) by mouth once daily.    magnesium oxide (MAG-OX) 400 mg (241.3 mg magnesium) tablet Take 1 tablet (400 mg total) by mouth 3 (three) times daily.    meclizine (ANTIVERT) 25 mg tablet Take 1 tablet (25 mg total) by mouth 3 (three) times daily as needed for Dizziness.    potassium chloride SA (K-DUR,KLOR-CON) 20 MEQ tablet Take 20 mEq by mouth once daily.    traZODone (DESYREL) 50 MG tablet TAKE 1 TABLET (50 MG TOTAL) BY MOUTH EVERY EVENING.    omeprazole (PRILOSEC) 40 MG capsule Take 1 capsule (40 mg total) by mouth once daily.       Review of patient's allergies indicates:   Allergen Reactions    Bee sting [allergen ext-venom-honey bee]      Rash      Grass pollen-bermuda, standard      rash       Review of Systems   Constitutional: Positive for activity change and unexpected weight change. Negative for chills and fever.   Eyes: Negative for visual disturbance.   Respiratory: Negative for shortness of breath.    Cardiovascular: Negative for chest pain.   Gastrointestinal: Positive for abdominal pain, nausea and vomiting. Negative for diarrhea.   Endocrine: Negative.    Genitourinary: Positive for flank pain. Negative for dysuria, hematuria, vaginal bleeding and vaginal discharge.   Musculoskeletal: Negative for back pain.   Integumentary:  Negative for rash.   Neurological: Negative for headaches.   Hematological: Negative.    Psychiatric/Behavioral: Positive for depression.   Breast: negative.       Objective:     Vital Signs (Most Recent):  Temp: 97.6 °F (36.4 °C) (03/22/20 0425)  Pulse: 82 (03/22/20 0425)  Resp:  "16 (03/22/20 0425)  BP: 105/69 (03/22/20 0425)  SpO2: 95 % (03/22/20 0425) Vital Signs (24h Range):  Temp:  [97.6 °F (36.4 °C)-98.4 °F (36.9 °C)] 97.6 °F (36.4 °C)  Pulse:  [] 82  Resp:  [12-16] 16  SpO2:  [95 %-100 %] 95 %  BP: (105-143)/(58-85) 105/69     Weight: 106.6 kg (235 lb)  Body mass index is 34.7 kg/m².    Physical Exam:   Constitutional: She is oriented to person, place, and time. She appears well-developed and well-nourished. No distress.   Patient appears to be in no acute distress or acute discomfort. Speaking in full sentences.     HENT:   Head: Normocephalic and atraumatic.    Eyes: Conjunctivae are normal.    Neck: Neck supple.    Cardiovascular: Normal rate, regular rhythm and intact distal pulses.     Pulmonary/Chest: Effort normal. No respiratory distress.        Abdominal: Soft. She exhibits no distension. There is no tenderness. There is no rebound and no guarding.     Genitourinary: Vagina normal.   Genitourinary Comments: Minimal bilateral CVA tenderness.  Large melanotic area of groin and upper leg consistent with "birth yahaira." Little in place draining blood tinged urine.            Musculoskeletal: She exhibits no edema.   SCDs in place.        Neurological: She is alert and oriented to person, place, and time.    Skin: Skin is warm and dry. She is not diaphoretic.    Psychiatric: She has a normal mood and affect. Her behavior is normal.       Laboratory:  CBC:   Recent Labs   Lab 03/21/20  0833 03/22/20  0430   WBC 14.36* 11.33   HGB 10.5* 10.2*   HCT 34.2* 34.1*   * 365*   , CMP:   Recent Labs   Lab 03/21/20  0833 03/22/20  0430    140   K 3.5 4.5    107   CO2 23 25   * 133*   BUN 18 20   CREATININE 1.9* 1.4   CALCIUM 9.4 8.8   PROT 8.0 7.0   ALBUMIN 2.6* 2.1*   BILITOT 0.8 0.6   ALKPHOS 147* 198*   AST 22 37   ALT 20 33   ANIONGAP 10 8   EGFRNONAA 28.9* 41.7*   , Urine Studies:   Recent Labs   Lab 03/21/20  0834   COLORU [place]   APPEARANCEUA Cloudy*   PHUR " 5.0   SPECGRAV 1.025   PROTEINUA 2+*   GLUCUA Negative   KETONESU Negative   BILIRUBINUA Negative   OCCULTUA 1+*   NITRITE Negative   LEUKOCYTESUR Negative   RBCUA 7*   WBCUA 15*   BACTERIA Rare   SQUAMEPITHEL 3   HYALINECASTS 0    and All pertinent labs from the last 24 hours have been reviewed.    Diagnostic Results:  CT: Reviewed      Impression       Interval development of moderate right-sided hydroureteronephrosis.  No renal stones are visualized.  Given patient's history of hysterectomy and right common iliac lymph node resection secondary to metastatic cervical cancer, findings raise the concern for recurrence although no obvious mass is visualized.    Probable hepatic steatosis.

## 2020-03-22 NOTE — ASSESSMENT & PLAN NOTE
Hx of HTN on lisinopril 10mg PO .  Blood pressure appears normotensive on admission.     Plan:  - continue home med on discharge

## 2020-03-22 NOTE — SUBJECTIVE & OBJECTIVE
Interval History: Patient states that her pain has improved this morning since arrival at the hospital.  CORNELIUS.  Patient's nino was removed this AM.      Review of Systems   Constitutional: Negative for chills and fever.   Respiratory: Negative for cough and shortness of breath.    Cardiovascular: Negative for chest pain and leg swelling.   Gastrointestinal: Negative for abdominal distention, abdominal pain, diarrhea, nausea and vomiting.   Genitourinary: Negative for difficulty urinating and dysuria.   Musculoskeletal: Negative for arthralgias and back pain.   Skin: Negative for pallor and rash.   Psychiatric/Behavioral: Negative for agitation and confusion.     Objective:     Vital Signs (Most Recent):  Temp: 96 °F (35.6 °C) (03/22/20 0806)  Pulse: 86 (03/22/20 0806)  Resp: 16 (03/22/20 0806)  BP: 117/68 (03/22/20 0806)  SpO2: 96 % (03/22/20 0806) Vital Signs (24h Range):  Temp:  [96 °F (35.6 °C)-98.4 °F (36.9 °C)] 96 °F (35.6 °C)  Pulse:  [] 86  Resp:  [12-16] 16  SpO2:  [95 %-100 %] 96 %  BP: (105-143)/(58-85) 117/68     Weight: 106.6 kg (235 lb)  Body mass index is 34.7 kg/m².    Intake/Output Summary (Last 24 hours) at 3/22/2020 0938  Last data filed at 3/22/2020 0428  Gross per 24 hour   Intake --   Output 700 ml   Net -700 ml      Physical Exam   Constitutional: She is oriented to person, place, and time.   Patient is a pleasant, elderly 56 yo F who appears older than stated, awake, alert and in NAD.    HENT:   Head: Normocephalic and atraumatic.   Eyes: Pupils are equal, round, and reactive to light. Conjunctivae and EOM are normal.   Neck: Normal range of motion. Neck supple.   Cardiovascular: Normal rate, regular rhythm and normal heart sounds.   No murmur heard.  Pulmonary/Chest: Effort normal and breath sounds normal. No respiratory distress. She has no wheezes.   Abdominal: Soft. Bowel sounds are normal. She exhibits no distension. There is no tenderness.   Musculoskeletal: Normal range of  motion. She exhibits no edema.   Neurological: She is alert and oriented to person, place, and time.   Skin: Skin is warm and dry. Capillary refill takes less than 2 seconds.   Psychiatric: She has a normal mood and affect.       Significant Labs: All pertinent labs within the past 24 hours have been reviewed.     Recent Labs   Lab 03/21/20  0833 03/22/20  0430   WBC 14.36* 11.33   HGB 10.5* 10.2*   HCT 34.2* 34.1*   * 365*   MCV 89 92   RDW 15.5* 15.9*    140   K 3.5 4.5    107   CO2 23 25   BUN 18 20   CREATININE 1.9* 1.4   * 133*   PROT 8.0 7.0   ALBUMIN 2.6* 2.1*   BILITOT 0.8 0.6   AST 22 37   ALKPHOS 147* 198*   ALT 20 33       Significant Imaging: I have reviewed all pertinent imaging results/findings within the past 24 hours.

## 2020-03-22 NOTE — ASSESSMENT & PLAN NOTE
- Initial diagnosis on pathological assessment of Hysterectomy specimen in 2015  - 2018 - Metastatic disease noted in right common iliac lymph node  - Underwent subsequent chemo-radiation (4 Cycles with whole pelvis radiation). Chemo discontinued secondary to persistent electrolyte abnormalities  - Most recent CT scans without evidence of disease

## 2020-03-22 NOTE — PLAN OF CARE
Eval completed and POC established     Redd Maxwell, PT, DPT  3/22/2020         Problem: Physical Therapy Goal  Goal: Physical Therapy Goal  Description  Goals to be met by: 2020    Patient will increase functional independence with mobility by performin. Supine to sit with Modified [place]  2. Sit to supine with Modified [place]  3. Sit to stand transfer with Modified [place]  4. Gait  x 100 feet with Stand-by Assistance using LRAD  5. Lower extremity exercise program x15 reps per handout, with independence    Outcome: Ongoing, Progressing

## 2020-03-22 NOTE — ASSESSMENT & PLAN NOTE
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  Labs revealed an elevated WBC (14k) and elevated Cr to 1.9 (baseline 1.2).  UA was negative for nitrites or leukocytes but notes 15 WBC.  CT abdomen moderate right-sided hydroureteronephrosis.  No renal stones are visualized.  She was given 2L of NS and 1x dose of IV ceftriaxone.      DDx of ODESSA includes but is not limited to: infection given elevated WBC and burning with urination vs pre-renal given hx of increased N/V and poor PO intake vs. Recurrent cancer given hx of unilateral hydroureteronephrosis in the setting of hx of metastatic cervical cancer and recent weight loss.  Also there is no obvious sign of obstructive stone on imaging to indicate a more likely cause of unilateral hydro.     Plan:  - continue Levofloxacin PO to complete a 7 day course  - Urology was consulted and preformed a cystoscopy and noted heaped up tissue concerning for bladder tumor versus radiation cystitis.     - S/p BL stent placement and TURBT.  - Biopsy taken during the procedure.  Follow up in Gyn Onc clinic.   - s/p 2L of NS in ED. Encourage increased PO intake  - strict I/O; monitor fluid output   - Avoid nephrotoxic agents including NSAIDs/ ACEi/ ARBs  - zofran PRN

## 2020-03-22 NOTE — ASSESSMENT & PLAN NOTE
-- Cr trending down, WBC trending down s/p b/l stent placement and TURBT   -- voiding trial today   -- we will arrange follow up with Dr. Balbuena in 2 months   -- virtual visit in 1-2 weeks to discuss path     -- patient is ok for discharge from urology perspective, we will sign off   -- please call with questions

## 2020-03-22 NOTE — CONSULTS
[facility]  Gynecologic Oncology  Consult Note    Patient Name: Edita Riley  MRN: [id number]  Admission Date: 3/21/2020  Hospital Length of Stay: 1 days  Attending Provider: Diomedes Fisher MD  Primary Care Provider: Audrey Mustafa MD  Principal Problem: Pyelonephritis of right kidney     Inpatient consult to Gynecologic Oncology  Consult performed by: Vidhi Jackson MD  Consult ordered by: Que Mcgarry MD        Subjective:     Chief Complaint/Reason for Admission: Abdominal Pain/Nausea and Vomiting    History of Present Illness:  Ms. 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 She is known the the gynecology oncology service and is followed by Dr. Lemon. The patient presented to the ED yesterday with complaints abdominal pain, nausea, and vomiting for the past 2 weeks. Patient states she started developing mid abdominal pain over the past two weeks that has now migrated predominantly to her right flank. The abdominal pain is intermittent in nature and has been worsening. She has also developed nausea and vomiting over the past two days to the point that she cannot keep anything down. Apart from a cough the patient denied other concerning signs for Covid.     In the ED, patient is afebrile and HD stable.  Labs are remarkable for an elevated WBC to 14k and elevated Cr to 1.9 (baseline around 1.2.)  UA was negative for nitrites or leukocytes but notes 15 WBC.  CT abdomen moderate right-sided hydroureteronephrosis.  No renal stones are visualized.  She was given 2L of NS and 1x dose of IV ceftriaxone.  Urology was consulted in the ED who note no immediate intervention.  Patient admitted to hospital medicine for further work up. Both the gynecology/oncology and urology service were consulted for assessment and follow-up.    Oncology History  Referring physician:  Dr. Mullen  "Derek  Reason for referral:  Incisional biopsy of retroperitoneal periaortic node that shows squamous cell carcinoma consistent with gynecologic primary    2015:  Underwent hysterectomy in 2015 in [place], [place] for "abnoormal pap smear. Incidental finding of cervical cancer on the hyst specimen. She does not know any other details.       Aug 2018: chronic abdominal pain referred for consideration of biopsy of a 2 cm right common iliac artery bifurcation node  Node was evident on scan in 4/18 and again in 7/18 - no change  Patient's symptoms are non specific, diffuse, have not resulted in weight loss  Patient already carries a diagnosis of diffuse pain syndromes including fibromyalgia     Aug 2018: PET scan Right common iliac lymph node suspicious for malignancy.  This could be benign or malignant lymphoproliferative disorder metastatic disease.  This is an a risky position for percutaneous biopsy.      Sept 17, 2018: Underwent retroperitoneal approach for incisional biopsy of right common iliac LN. Pathology showed:  Supplemental Diagnosis  METASTATIC HIGH-GRADE CARCINOMA GROWING WITH SQUAMOUS CELL FEATURES. THE RESULTS OF  ADDITIONAL IMMUNOSTAINS ARE AS FOLLOWS: CK5/6, P63 AND CK7 ARE POSITIVE. TTF, S100 AND  CK20 ARE ALL NEGATIVE. THE CONTROLS STAIN APPROPRIATELY.  NOTE: GIVEN THESE RESULTS THIS MAY REPRESENT A PRIMARY IN THE URINARY OR LOWER GYN  TRACTS. DR. JAMESON HAS ALSO REVIEWED THIS CASE AND CONCURS WITH THE DIAGNOSIS.     Nov 2018: started concurrent chemoradiation.      Dec 2018: admitted with hypokalemia, hypomagnesemia and hypocalcemia. Seizure like activity. Worsening depression. Electrolyte replacement. Started on Abilify by psychiatry. S/p 4 cycles of cisplatin.      Jan 2019: completed WPRT and 4 cycles of cisplatin. Received only 4 due to electrolyte abnormalities.      Feb 2019:PET: Right common iliac lymph node, shows max SUV of 5.6 on today's exam versus 10.2 on prior exam.  A subcentimeter lymph " node is seen in CT in this region.    The previously seen avid left axillary lymph node is no longer visualized is FDG avid.     May 2019: CT CAP: no evidence for disease. (CT done for insurance reason).  Previous imaging was a PET scan    Nov 2019: CT AP: STEVEN        Past Medical History:   Diagnosis Date    Anxiety     Cervical cancer 2014    Chronic back pain     Depression     Diarrhea due to malabsorption 11/14/2018    Fibromyalgia     GERD (gastroesophageal reflux disease)     Hiatal hernia 2014    History of cervical cancer 10/11/2018    Hx of psychiatric care     Cymbalta, trazodone    Hypertension     Hypomagnesemia 11/21/2018    Lactose intolerance     Metastatic squamous cell carcinoma to lymph node 10/11/2018    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 drug 11/14/2018    Osteoarthritis of back     Psychiatric problem     Stroke 1972     Past Surgical History:   Procedure Laterality Date    BILATERAL OOPHORECTOMY  2015    CHOLECYSTECTOMY  11/09/2016    Done at [facility], path showed chronic cholecystitis and gallstones    cold knife conization  2014    COLONOSCOPY  2014    COLONOSCOPY N/A 10/24/2016    at [facility], Dr Gutiérrez    COLONOSCOPY N/A 5/18/2018    Procedure: COLONOSCOPY;  Surgeon: Arden Gutiérrez MD;  Location: [facility] (4TH FLR);  Service: Endoscopy;  Laterality: N/A;    ESOPHAGOGASTRODUODENOSCOPY  2014    [facility]ST[facility]CTOMY  2014    [facility] for [facility]    OOPHORECTOMY      RETROPERITONEAL LYMPHADENECTOMY Right 9/17/2018    Procedure: LYMPHADENECTOMY, RETROPERITONEUM;  Surgeon: Sebas Reed MD;  Location: [facility] OR 2ND FLR;  Service: General;  Laterality: Right;  ILIAC     Family History     Problem Relation (Age of Onset)    Breast cancer Maternal Aunt    Cancer Father, Mother    Cervical cancer Mother    Colon cancer Father    Drug abuse Daughter, Daughter    Esophageal cancer Father    Heart disease Sister, Maternal Grandmother    Suicide Daughter        Tobacco Use     Smoking status: Former Smoker    Smokeless tobacco: Never Used   Substance and Sexual Activity    Alcohol use: No     Alcohol/week: 0.0 standard drinks    Drug use: No    Sexual activity: Yes     Partners: Male     Birth control/protection: None     Comment:  19 years since 1999       PTA Medications   Medication Sig    cholecalciferol, vitamin D3, 2,000 unit Cap Take 1 capsule by mouth once daily.    DULoxetine (CYMBALTA) 60 MG capsule Take 1 capsule (60 mg total) by mouth once daily.    gabapentin (NEURONTIN) 300 MG capsule Take 1 capsule (300 mg total) by mouth 3 (three) times daily.    lisinopril 10 MG tablet Take 1 tablet (10 mg total) by mouth once daily.    magnesium oxide (MAG-OX) 400 mg (241.3 mg magnesium) tablet Take 1 tablet (400 mg total) by mouth 3 (three) times daily.    meclizine (ANTIVERT) 25 mg tablet Take 1 tablet (25 mg total) by mouth 3 (three) times daily as needed for Dizziness.    potassium chloride SA (K-DUR,KLOR-CON) 20 MEQ tablet Take 20 mEq by mouth once daily.    traZODone (DESYREL) 50 MG tablet TAKE 1 TABLET (50 MG TOTAL) BY MOUTH EVERY EVENING.    omeprazole (PRILOSEC) 40 MG capsule Take 1 capsule (40 mg total) by mouth once daily.       Review of patient's allergies indicates:   Allergen Reactions    Bee sting [allergen ext-venom-honey bee]      Rash      Grass pollen-bermuda, standard      rash       Review of Systems   Constitutional: Positive for activity change and unexpected weight change. Negative for chills and fever.   Eyes: Negative for visual disturbance.   Respiratory: Negative for shortness of breath.    Cardiovascular: Negative for chest pain.   Gastrointestinal: Positive for abdominal pain, nausea and vomiting. Negative for diarrhea.   Endocrine: Negative.    Genitourinary: Positive for flank pain. Negative for dysuria, hematuria, vaginal bleeding and vaginal discharge.   Musculoskeletal: Negative for back pain.   Integumentary:  Negative for rash.  "  Neurological: Negative for headaches.   Hematological: Negative.    Psychiatric/Behavioral: Positive for depression.   Breast: negative.       Objective:     Vital Signs (Most Recent):  Temp: 97.6 °F (36.4 °C) (03/22/20 0425)  Pulse: 82 (03/22/20 0425)  Resp: 16 (03/22/20 0425)  BP: 105/69 (03/22/20 0425)  SpO2: 95 % (03/22/20 0425) Vital Signs (24h Range):  Temp:  [97.6 °F (36.4 °C)-98.4 °F (36.9 °C)] 97.6 °F (36.4 °C)  Pulse:  [] 82  Resp:  [12-16] 16  SpO2:  [95 %-100 %] 95 %  BP: (105-143)/(58-85) 105/69     Weight: 106.6 kg (235 lb)  Body mass index is 34.7 kg/m².    Physical Exam:   Constitutional: She is oriented to person, place, and time. She appears well-developed and well-nourished. No distress.   Patient appears to be in no acute distress or acute discomfort. Speaking in full sentences.     HENT:   Head: Normocephalic and atraumatic.    Eyes: Conjunctivae are normal.    Neck: Neck supple.    Cardiovascular: Normal rate, regular rhythm and intact distal pulses.     Pulmonary/Chest: Effort normal. No respiratory distress.        Abdominal: Soft. She exhibits no distension. There is no tenderness. There is no rebound and no guarding.     Genitourinary: Vagina normal.   Genitourinary Comments: Minimal bilateral CVA tenderness.  Large melanotic area of groin and upper leg consistent with "birth yahaira." Little in place draining blood tinged urine.            Musculoskeletal: She exhibits no edema.   SCDs in place.        Neurological: She is alert and oriented to person, place, and time.    Skin: Skin is warm and dry. She is not diaphoretic.    Psychiatric: She has a normal mood and affect. Her behavior is normal.       Laboratory:  CBC:   Recent Labs   Lab 03/21/20 0833 03/22/20  0430   WBC 14.36* 11.33   HGB 10.5* 10.2*   HCT 34.2* 34.1*   * 365*   , CMP:   Recent Labs   Lab 03/21/20  0833 03/22/20  0430    140   K 3.5 4.5    107   CO2 23 25   * 133*   BUN 18 20   CREATININE " 1.9* 1.4   CALCIUM 9.4 8.8   PROT 8.0 7.0   ALBUMIN 2.6* 2.1*   BILITOT 0.8 0.6   ALKPHOS 147* 198*   AST 22 37   ALT 20 33   ANIONGAP 10 8   EGFRNONAA 28.9* 41.7*   , Urine Studies:   Recent Labs   Lab 03/21/20  0834   COLORU [place]   APPEARANCEUA Cloudy*   PHUR 5.0   SPECGRAV 1.025   PROTEINUA 2+*   GLUCUA Negative   KETONESU Negative   BILIRUBINUA Negative   OCCULTUA 1+*   NITRITE Negative   LEUKOCYTESUR Negative   RBCUA 7*   WBCUA 15*   BACTERIA Rare   SQUAMEPITHEL 3   HYALINECASTS 0    and All pertinent labs from the last 24 hours have been reviewed.    Diagnostic Results:  CT: Reviewed      Impression       Interval development of moderate right-sided hydroureteronephrosis.  No renal stones are visualized.  Given patient's history of hysterectomy and right common iliac lymph node resection secondary to metastatic cervical cancer, findings raise the concern for recurrence although no obvious mass is visualized.    Probable hepatic steatosis.           Assessment/Plan:     * Pyelonephritis of right kidney  - UA was negative for nitrites or leukocytes but 15 WBC  - Afebrile an admission with CVA tenderness. WBC 14 > 11 this AM  - Primary team treating as pyelo with Rocephin 1 g q 24  - Minimal CVA tenderness on exam this AM    Hydronephrosis of right kidney  - Noted per CT imaging on intake to the ED. Confirmed with retrograde pyelogram in OR per Urology  - Etiology unknown yet fibrosis secondary to pelvic radiation vs. Mass effect secondary to recurrent disease are on differential. While mass effect is possible no evidence of mass of most current or recent CT scans.  - S/P BL ureteral stents per urology who is following    ODESSA (acute kidney injury)  - Cr - 1.9 on Admission, 1.1 Baseline > Resolving 1.4 (WNL) this AM  - Likely secondary to right-sided hydronephrosis  - Dehydration secondary to inability to tolerate PO likely contributory  - S/P BL ureteral stents 3/21 per urology  - Good UOP overnight  - Little in  place. Defer management to primaries teams  - Urology on board and following      Metastatic squamous cell carcinoma to lymph node  - Initial diagnosis on pathological assessment of Hysterectomy specimen in 2015  - 2018 - Metastatic disease noted in right common iliac lymph node  - Underwent subsequent chemo-radiation (4 Cycles with whole pelvis radiation). Chemo discontinued secondary to persistent electrolyte abnormalities  - Most recent CT scans without evidence of disease      Patient seen and evaluated by resident and attending. Agree with current clinical evaluation and management. Foresee minimal contribution from the Gynecology/Oncology service at this time. ODESSA appears to be improving. WBCs trending down. Will await results of biopsies taken per Urology. Patient will need outpatient follow-up and pelvic exam with Dr. Lemon in two week. We will arrange. Please contact us with any questions or concerns. Pager [phone].      Thank you for your consult. I will sign off. Please contact us if you have any additional questions.    Vidhi Jackson MD  Gynecologic Oncology  [facility]

## 2020-03-22 NOTE — NURSING
Paged Med Team 1, patient still complaint of pain rating 6 after tylenol, requesting something stronger for pain if possible.

## 2020-03-22 NOTE — SUBJECTIVE & OBJECTIVE
Interval History:  S/p b/l ureteral stent placement and TURBY 3/21  No acute events overnight   Feeling better this morning, no complaints   Little catheter removed at beside     Objective:     Temp:  [96 °F (35.6 °C)-98.4 °F (36.9 °C)] 96 °F (35.6 °C)  Pulse:  [] 86  Resp:  [12-16] 16  SpO2:  [95 %-100 %] 96 %  BP: (105-143)/(58-85) 117/68     Body mass index is 34.7 kg/m².           Drains     None                 Physical Exam   Constitutional: She is oriented to person, place, and time. She appears well-developed and well-nourished. No distress.   HENT:   Head: Normocephalic and atraumatic.   Eyes: No scleral icterus.   Neck: No tracheal deviation present.   Cardiovascular: Normal rate.    Pulmonary/Chest: Effort normal. No respiratory distress.   Abdominal: Soft. She exhibits no distension. There is no tenderness.   Genitourinary:   Genitourinary Comments: R>L flank tenderness   Musculoskeletal: Normal range of motion.   Neurological: She is alert and oriented to person, place, and time.   Skin: Skin is warm and dry.     Psychiatric: She has a normal mood and affect. Her behavior is normal.       Significant Labs:    BMP:  Recent Labs   Lab 03/21/20  0833 03/22/20  0430    140   K 3.5 4.5    107   CO2 23 25   BUN 18 20   CREATININE 1.9* 1.4   CALCIUM 9.4 8.8       CBC:  Recent Labs   Lab 03/21/20  0833 03/22/20  0430   WBC 14.36* 11.33   HGB 10.5* 10.2*   HCT 34.2* 34.1*   * 365*       All pertinent labs results from the past 24 hours have been reviewed.    Significant Imaging:  All pertinent imaging results/findings from the past 24 hours have been reviewed.  R hydroureteronephrosis down to bladder, no stones  L kidney and ureter unremarkable

## 2020-03-22 NOTE — ASSESSMENT & PLAN NOTE
- Cr - 1.9 on Admission, 1.1 Baseline > Resolving 1.4 (WNL) this AM  - Likely secondary to right-sided hydronephrosis  - Dehydration secondary to inability to tolerate PO likely contributory  - S/P BL ureteral stents 3/21 per urology  - Good UOP overnight  - Little in place. Defer management to primaries teams  - Urology on board and following

## 2020-03-22 NOTE — PLAN OF CARE
Problem: Occupational Therapy Goal  Goal: Occupational Therapy Goal  Description  Goals to be met by: 3/30/2020    Patient will increase functional independence with ADLs by performing:    UE Dressing with Supervision.  LE Dressing with Supervision.  Grooming while standing at sink with [place].  Toileting from toilet with Supervision for hygiene and clothing management.   Toilet transfer to toilet with Supervision.     Outcome: Ongoing, Progressing    Oziel Raya OTR/L  3/22/2020

## 2020-03-22 NOTE — PT/OT/SLP EVAL
Occupational Therapy   Evaluation    Name: Edita Riley  MRN: [id number]  Admitting Diagnosis:  ODESSA (acute kidney injury) 1 Day Post-Op    Recommendations:     Discharge Recommendations: home with home health  Discharge Equipment Recommendations:  none  Barriers to discharge:  None    Assessment:     Edita Riley is a 55 y.o. female with a medical diagnosis of ODESSA (acute kidney injury).  She presents with impairments listed below. Pt did well to participate in session, but does displayed deficits for endurance w/ oob activities.  Pt displayed global deconditioning requiring increased assist for ADLs and mobility at this time. Pt would benefit from skilled OT services to improve independence and overall occupational functioning.     Performance deficits affecting function: weakness, impaired endurance, impaired self care skills, impaired functional mobilty, decreased lower extremity function, decreased safety awareness.      Rehab Prognosis: Good; patient would benefit from acute skilled OT services to address these deficits and reach maximum level of function.       Plan:     Patient to be seen 3 x/week to address the above listed problems via self-care/home management, therapeutic activities, therapeutic exercises  · Plan of Care Expires: 04/22/20  · Plan of Care Reviewed with: patient    Subjective     Chief Complaint: No complaints   Patient/Family Comments/goals: return home    Occupational Profile:  Living Environment: Pt lives in a ssh w/ spouse and dtr w/ 3 steps to enter and L HR.   Previous level of function: Assist for ADLs and MOD I for mobility (RW).   Roles and Routines: N/A  Equipment Used at Home:  none  Assistance upon Discharge: Pt has assistance upon D/C.     Pain/Comfort:  · Pain Rating 1: 0/10  · Pain Rating Post-Intervention 1: 0/10    Patients cultural, spiritual, Shinto conflicts given the current situation:      Objective:     Communicated with: RN prior to session.   Patient found HOB elevated with   upon OT entry to room.    General Precautions: Standard, fall   Orthopedic Precautions:N/A   Braces: N/A     Occupational Performance:    Bed Mobility:    · Patient completed Scooting/Bridging with stand by assistance  · Patient completed Supine to Sit with stand by assistance    Functional Mobility/Transfers:  · Patient completed Sit <> Stand Transfer with stand by assistance  with  rolling walker   · Patient completed Bed <> Chair Transfer using Step Transfer technique with stand by assistance with rolling walker  · Functional Mobility: Pt ambulated ~30 ft at a w/ RW.    Activities of Daily Living:  · Feeding:  independence    · Grooming: stand by assistance facial and hand hygiene while standing at the sink   · Upper Body Dressing: minimum assistance donned gown as robe  · Lower Body Dressing: stand by assistance donned socks seated EOB using figure 4 technqiue     Cognitive/Visual Perceptual:  Cognitive/Psychosocial Skills:     -       Oriented to: Person, Place, Time and Situation   -       Follows Commands/attention:Follows multistep  commands  -       Communication: clear/fluent  -       Memory: No Deficits noted  -       Safety awareness/insight to disability: intact   -       Mood/Affect/Coping skills/emotional control: Appropriate to situation  Visual/Perceptual:      -Intact      Physical Exam:  Balance:    -       sba for ambualtion w/ RW  Postural examination/scapula alignment:    -       Rounded shoulders  Skin integrity: Visible skin intact  Upper Extremity Range of Motion:     -       Right Upper Extremity: WFL  -       Left Upper Extremity: WFL  Upper Extremity Strength:    -       Right Upper Extremity: WFL  -       Left Upper Extremity: WFL   Strength:    -       Right Upper Extremity: WFL  -       Left Upper Extremity: WFL  Fine Motor Coordination:    -       Intact  Gross motor coordination:   WFL    AMPAC 6 Click ADL:  AMPAC Total Score: 20    Treatment  & Education:  Pt educated on POC.   Education:    Patient left up in chair with all lines intact and call button in reach    GOALS:   Multidisciplinary Problems     Occupational Therapy Goals        Problem: Occupational Therapy Goal    Goal Priority Disciplines Outcome Interventions   Occupational Therapy Goal     OT, PT/OT Ongoing, Progressing    Description:  Goals to be met by: 3/30/2020    Patient will increase functional independence with ADLs by performing:    UE Dressing with Supervision.  LE Dressing with Supervision.  Grooming while standing at sink with [place].  Toileting from toilet with Supervision for hygiene and clothing management.   Toilet transfer to toilet with Supervision.                      History:     Past Medical History:   Diagnosis Date    Anxiety     Cervical cancer 2014    Chronic back pain     Depression     Diarrhea due to malabsorption 11/14/2018    Fibromyalgia     GERD (gastroesophageal reflux disease)     Hiatal hernia 2014    History of cervical cancer 10/11/2018    Hx of psychiatric care     Cymbalta, trazodone    Hypertension     Hypomagnesemia 11/21/2018    Lactose intolerance     Metastatic squamous cell carcinoma to lymph node 10/11/2018    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 drug 11/14/2018    Osteoarthritis of back     Psychiatric problem     Stroke 1972       Past Surgical History:   Procedure Laterality Date    BILATERAL OOPHORECTOMY  2015    CHOLECYSTECTOMY  11/09/2016    Done at [facility], path showed chronic cholecystitis and gallstones    cold knife conization  2014    COLONOSCOPY  2014    COLONOSCOPY N/A 10/24/2016    at [facility], Dr Thomas    COLONOSCOPY N/A 5/18/2018    Procedure: COLONOSCOPY;  Surgeon: Arden Gutiérrez MD;  Location: [facility] ([address] [place] [address]);  Service: Endoscopy;  Laterality: N/A;    ESOPHAGOGASTRODUODENOSCOPY  2014    HYSTERECTOMY  2014    [facility] for cervical cancer    OOPHORECTOMY      RETROPERITONEAL LYMPHADENECTOMY  Right 9/17/2018    Procedure: LYMPHADENECTOMY, RETROPERITONEUM;  Surgeon: Sebas Reed MD;  Location: [facility] OR [address] [place] [address];  Service: General;  Laterality: Right;  ILIAC       Time Tracking:     OT Date of Treatment: 03/22/20  OT Start Time: 0905  OT Stop Time: 0921  OT Total Time (min): 16 min    Billable Minutes:Evaluation 8 minutes  Self Care/Home Management 8 minutes    Oziel Raya, OT  3/22/2020

## 2020-03-22 NOTE — ASSESSMENT & PLAN NOTE
- UA was negative for nitrites or leukocytes but 15 WBC  - Afebrile an admission with CVA tenderness. WBC 14 > 11 this AM  - Primary team treating as pyelo with Rocephin 1 g q 24  - Minimal CVA tenderness on exam this AM

## 2020-03-22 NOTE — ASSESSMENT & PLAN NOTE
History of cervical cancer     Patient with hx of recurrent metastatic SCCA.  She follows with Dr. Lemon (gyn onc) and Dr. Fagan (radiation oncology).   S/p 4 cycles of cisplatin and concurrent chemoradiation in 2018.  - May 2019: CT CAP: no evidence for disease.     Plan:  - consulted gyn onc, appreciate recommendations   - f/u outpatient for results of biopsy

## 2020-03-22 NOTE — PLAN OF CARE
Quiet hours. No complaints. Vss  Slept well. Little cath patent with rust colored urine, no clots noted. Safety maintained.

## 2020-03-22 NOTE — PROGRESS NOTES
[facility]  Urology  Progress Note    Patient Name: Edita Riley  MRN: [id number]  Admission Date: 3/21/2020  Hospital Length of Stay: 1 days  Code Status: Full Code   Attending Provider: Diomedes Fisher MD   Primary Care Physician: Audrey Mustafa MD    Subjective:     HPI:  This is a 55 YOF with history fibromyalgia, major depressive disorder, and cervical cancer status post hysterectomy in 2015 who was then found to have a pelvic lymph node recurrence after evaluation for generalized malaise and abdominal pain. Workup with CT of the abdomen and pelvis on 6/24/2018 revealed an enlarged lymph node at the bifurcation of the right common iliac measuring 2 cm.  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 She also underwent evaluation by Urology including cystoscopy 10/2018 which was negative for any urological origin. She completed definitive chemo XRT on 1/3/19.     She presents to the ER with right flank pain, abdominal pain x 2 days with associated nausea and vomiting.   Here she is AF, VSS  Her Cr is increased at 1.9 from baseline 1.1. WBC 14   CT done shows new R sided hydroureteronephrosis with no obvious cause.   UA not concerning for infection.     Interval History:  S/p b/l ureteral stent placement and TURBY 3/21  No acute events overnight   Feeling better this morning, no complaints   Little catheter removed at beside     Objective:     Temp:  [96 °F (35.6 °C)-98.4 °F (36.9 °C)] 96 °F (35.6 °C)  Pulse:  [] 86  Resp:  [12-16] 16  SpO2:  [95 %-100 %] 96 %  BP: (105-143)/(58-85) 117/68     Body mass index is 34.7 kg/m².           Drains     None                 Physical Exam   Constitutional: She is oriented to person, place, and time. She appears well-developed and well-nourished. No distress.   HENT:   Head: Normocephalic and atraumatic.   Eyes: No scleral icterus.    Neck: No tracheal deviation present.   Cardiovascular: Normal rate.    Pulmonary/Chest: Effort normal. No respiratory distress.   Abdominal: Soft. She exhibits no distension. There is no tenderness.   Genitourinary:   Genitourinary Comments: R>L flank tenderness   Musculoskeletal: Normal range of motion.   Neurological: She is alert and oriented to person, place, and time.   Skin: Skin is warm and dry.     Psychiatric: She has a normal mood and affect. Her behavior is normal.       Significant Labs:    BMP:  Recent Labs   Lab 03/21/20  0833 03/22/20  0430    140   K 3.5 4.5    107   CO2 23 25   BUN 18 20   CREATININE 1.9* 1.4   CALCIUM 9.4 8.8       CBC:  Recent Labs   Lab 03/21/20  0833 03/22/20  0430   WBC 14.36* 11.33   HGB 10.5* 10.2*   HCT 34.2* 34.1*   * 365*       All pertinent labs results from the past 24 hours have been reviewed.    Significant Imaging:  All pertinent imaging results/findings from the past 24 hours have been reviewed.  R hydroureteronephrosis down to bladder, no stones  L kidney and ureter unremarkable                  Assessment/Plan:     Hydronephrosis of right kidney  -- Cr trending down, WBC trending down s/p b/l stent placement and TURBT   -- voiding trial today   -- we will arrange follow up with Dr. Balbuena in 2 months   -- virtual visit in 1-2 weeks to discuss path     -- patient is ok for discharge from urology perspective, we will sign off   -- please call with questions            VTE Risk Mitigation (From admission, onward)    None          Mana Wilks MD  Urology  [facility]

## 2020-03-22 NOTE — NURSING
Received admit from pacu via stretcher 54 y/o bf s/p cysto with aleah stents and TURBT. Aaox4, no distress noted. No complaints at present. Little cath patent with dark yellow urine. Will continue to monitor.

## 2020-03-22 NOTE — PT/OT/SLP EVAL
Physical Therapy Evaluation    Patient Name:  Edita Riley   MRN:  [id number]    Recommendations:     Discharge Recommendations:  home with home health   Discharge Equipment Recommendations: none   Barriers to discharge: None    Assessment:     Edita Riley is a 55 y.o. female admitted with a medical diagnosis of Pyelonephritis of right kidney.  She presents with the following impairments/functional limitations:  weakness, impaired functional mobilty, impaired balance, impaired endurance, impaired self care skills, gait instability. Pt performing functional mobility near baseline but would benefit from continued skilled acute PT 4x/wk to improve functional mobility.  Recommending pt receive PT services in  setting following discharge from hospital once medically cleared.     Rehab Prognosis: Fair; patient would benefit from acute skilled PT services to address these deficits and reach maximum level of function.    Recent Surgery: Procedure(s) (LRB):  CYSTOSCOPY, WITH RETROGRADE PYELOGRAM, (Bilateral)  TURBT (TRANSURETHRAL RESECTION OF BLADDER TUMOR) (N/A) 1 Day Post-Op    Plan:     During this hospitalization, patient to be seen 4 x/week to address the identified rehab impairments via gait training, therapeutic activities, therapeutic exercises, neuromuscular re-education and progress toward the following goals:    · Plan of Care Expires:  04/22/20    Subjective     Chief Complaint: none noted   Patient/Family Comments/goals: Pt pleasant and willing to participate with therapy on this date.    Pain/Comfort:  · Pain Rating 1: 0/10    Patients cultural, spiritual, Restorationist conflicts given the current situation: no    Living Environment:  Pt lives w/ and her daughter in a first floor apartment w/3 MANAV and L handrail.   Prior to admission, patients level of function was requiring assist from family for both ADLs and amb without AD.  Equipment used at home: walker, rolling.  DME owned  (not currently used): none.  Upon discharge, patient will have assistance from family.    Objective:     Communicated with NSG prior to session.  Patient found HOB elevated with    upon PT entry to room.    General Precautions: Standard, fall   Orthopedic Precautions:N/A   Braces: N/A     Exams:  · Cognitive Exam:  Patient is oriented to Person, Place, Time and Situation  · Gross Motor Coordination:  WFL  · RLE ROM: WFL  · RLE Strength: WFL  · LLE ROM: WFL  · LLE Strength: WFL    Functional Mobility:  · Bed Mobility:     · Supine to Sit: stand by assistance and increased time   · Transfers:     · Sit to Stand:  contact guard assistance with rolling walker  · Gait: 30ft SBA RW  · Balance: SBA      Therapeutic Activities and Exercises:   - Pt educated on:   -PT roles, expectations, and POC    -Safety with mobility   -Benefits of OOB activities to increase strength and functional mobility    -Performing ther ex for increasing LE ROM and strength   -Discharge recommendations     AM-PAC 6 CLICK MOBILITY  Total Score:18     Patient left up in chair with call button in reach.    GOALS:   Multidisciplinary Problems     Physical Therapy Goals        Problem: Physical Therapy Goal    Goal Priority Disciplines Outcome Goal Variances Interventions   Physical Therapy Goal     PT, PT/OT Ongoing, Progressing     Description:  Goals to be met by: 2020    Patient will increase functional independence with mobility by performin. Supine to sit with Modified [place]  2. Sit to supine with Modified [place]  3. Sit to stand transfer with Modified [place]  4. Gait  x 100 feet with Stand-by Assistance using LRAD  5. Lower extremity exercise program x15 reps per handout, with independence                     History:     Past Medical History:   Diagnosis Date    Anxiety     Cervical cancer     Chronic back pain     Depression     Diarrhea due to malabsorption 2018    Fibromyalgia     GERD  (gastroesophageal reflux disease)     Hiatal hernia 2014    History of cervical cancer 10/11/2018    Hx of psychiatric care     Cymbalta, trazodone    Hypertension     Hypomagnesemia 11/21/2018    Lactose intolerance     Metastatic squamous cell carcinoma to lymph node 10/11/2018    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 drug 11/14/2018    Osteoarthritis of back     Psychiatric problem     Stroke 1972       Past Surgical History:   Procedure Laterality Date    BILATERAL OOPHORECTOMY  2015    CHOLECYSTECTOMY  11/09/2016    Done at [facility], path showed chronic cholecystitis and gallstones    cold knife conization  2014    COLONOSCOPY  2014    COLONOSCOPY N/A 10/24/2016    at [facility], Dr Gutiérrez    COLONOSCOPY N/A 5/18/2018    Procedure: COLONOSCOPY;  Surgeon: Arden Gutiérrez MD;  Location: [facility] (4TH FLR);  Service: Endoscopy;  Laterality: N/A;    ESOPHAGOGASTRODUODENOSCOPY  2014    HYSTERECTOMY  2014    [facility] for cervical [facility]    OOPHORECTOMY      RETROPERITONEAL LYMPHADENECTOMY Right 9/17/2018    Procedure: LYMPHADENECTOMY, RETROPERITONEUM;  Surgeon: Sebas Reed MD;  [facility];  Service: General;  Laterality: Right;  ILIAC       Time Tracking:     PT Received On: 03/22/20  PT Start Time: 0903     PT Stop Time: 0920  PT Total Time (min): 17 min     Billable Minutes: Evaluation 8 and Gait Training 9      Alvarez Maxwell, PT  03/22/2020

## 2020-03-22 NOTE — HPI
"Ms. 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 She is known the the gynecology oncology service and is followed by Dr. Lemon. The patient presented to the ED yesterday with complaints abdominal pain, nausea, and vomiting for the past 2 weeks. Patient states she started developing mid abdominal pain over the past two weeks that has now migrated predominantly to her right flank. The abdominal pain is intermittent in nature and has been worsening. She has also developed nausea and vomiting over the past two days to the point that she cannot keep anything down. Apart from a cough the patient denied other concerning signs for Covid.     In the ED, patient is afebrile and HD stable.  Labs are remarkable for an elevated WBC to 14k and elevated Cr to 1.9 (baseline around 1.2.)  UA was negative for nitrites or leukocytes but notes 15 WBC.  CT abdomen moderate right-sided hydroureteronephrosis.  No renal stones are visualized.  She was given 2L of NS and 1x dose of IV ceftriaxone.  Urology was consulted in the ED who note no immediate intervention.  Patient admitted to hospital medicine for further work up. Both the gynecology/oncology and urology service were consulted for assessment and follow-up.    Oncology History  Referring physician:  Dr. Sebas Reed  Reason for referral:  Incisional biopsy of retroperitoneal periaortic node that shows squamous cell carcinoma consistent with gynecologic primary    2015:  Underwent hysterectomy in 2015 in [place], [place] for "abnoormal pap smear. Incidental finding of cervical cancer on the hyst specimen. She does not know any other details.       Aug 2018: chronic abdominal pain referred for consideration of biopsy of a 2 cm right common iliac artery bifurcation node  Node was evident on scan in 4/18 and again in 7/18 - no change  Patient's symptoms are non specific, diffuse, have " not resulted in weight loss  Patient already carries a diagnosis of diffuse pain syndromes including fibromyalgia     Aug 2018: PET scan Right common iliac lymph node suspicious for malignancy.  This could be benign or malignant lymphoproliferative disorder metastatic disease.  This is an a risky position for percutaneous biopsy.      Sept 17, 2018: Underwent retroperitoneal approach for incisional biopsy of right common iliac LN. Pathology showed:  Supplemental Diagnosis  METASTATIC HIGH-GRADE CARCINOMA GROWING WITH SQUAMOUS CELL FEATURES. THE RESULTS OF  ADDITIONAL IMMUNOSTAINS ARE AS FOLLOWS: CK5/6, P63 AND CK7 ARE POSITIVE. TTF, S100 AND  CK20 ARE ALL NEGATIVE. THE CONTROLS STAIN APPROPRIATELY.  NOTE: GIVEN THESE RESULTS THIS MAY REPRESENT A PRIMARY IN THE URINARY OR LOWER GYN  TRACTS. DR. JAMESON HAS ALSO REVIEWED THIS CASE AND CONCURS WITH THE DIAGNOSIS.     Nov 2018: started concurrent chemoradiation.      Dec 2018: admitted with hypokalemia, hypomagnesemia and hypocalcemia. Seizure like activity. Worsening depression. Electrolyte replacement. Started on Abilify by psychiatry. S/p 4 cycles of cisplatin.      Jan 2019: completed WPRT and 4 cycles of cisplatin. Received only 4 due to electrolyte abnormalities.      Feb 2019:PET: Right common iliac lymph node, shows max SUV of 5.6 on today's exam versus 10.2 on prior exam.  A subcentimeter lymph node is seen in CT in this region.    The previously seen avid left axillary lymph node is no longer visualized is FDG avid.     May 2019: CT CAP: no evidence for disease. (CT done for insurance reason).  Previous imaging was a PET scan    Nov 2019: CT AP: STEVEN

## 2020-03-23 ENCOUNTER — TELEPHONE (OUTPATIENT)
Dept: REHABILITATION | Facility: HOSPITAL | Age: 55
End: 2020-03-23

## 2020-03-23 LAB — BACTERIA UR CULT: ABNORMAL

## 2020-03-23 NOTE — TELEPHONE ENCOUNTER
I called ms. Riley today in reference to the referral for outpatient PT/OT. Ms. Steve declined physical therapy at this time due to concerns with the COVID19 virus and not wanting to come to our clinic at this time.    Andria Be, PT

## 2020-03-25 ENCOUNTER — HOSPITAL ENCOUNTER (EMERGENCY)
Facility: HOSPITAL | Age: 55
Discharge: HOME | End: 2020-03-26
Attending: EMERGENCY MEDICINE
Payer: MEDICAID

## 2020-03-25 VITALS
DIASTOLIC BLOOD PRESSURE: 78 MMHG | SYSTOLIC BLOOD PRESSURE: 164 MMHG | RESPIRATION RATE: 16 BRPM | HEART RATE: 101 BPM | OXYGEN SATURATION: 99 % | TEMPERATURE: 98 F

## 2020-03-25 DIAGNOSIS — R10.9 FLANK PAIN: Primary | ICD-10-CM

## 2020-03-25 LAB
BACTERIA #/AREA URNS AUTO: ABNORMAL /HPF
BASOPHILS # BLD AUTO: 0.06 K/UL (ref 0–0.2)
BASOPHILS NFR BLD: 0.4 % (ref 0–1.9)
BILIRUB UR QL STRIP: NEGATIVE
CLARITY UR REFRACT.AUTO: ABNORMAL
COLOR UR AUTO: ABNORMAL
DIFFERENTIAL METHOD: ABNORMAL
EOSINOPHIL # BLD AUTO: 0.2 K/UL (ref 0–0.5)
EOSINOPHIL NFR BLD: 1.4 % (ref 0–8)
ERYTHROCYTE [DISTWIDTH] IN BLOOD BY AUTOMATED COUNT: 15.9 % (ref 11.5–14.5)
GLUCOSE UR QL STRIP: ABNORMAL
HCT VFR BLD AUTO: 38 % (ref 37–48.5)
HGB BLD-MCNC: 11.5 G/DL (ref 12–16)
HGB UR QL STRIP: ABNORMAL
HYALINE CASTS UR QL AUTO: 0 /LPF
IMM GRANULOCYTES # BLD AUTO: 0.1 K/UL (ref 0–0.04)
IMM GRANULOCYTES NFR BLD AUTO: 0.7 % (ref 0–0.5)
KETONES UR QL STRIP: NEGATIVE
LEUKOCYTE ESTERASE UR QL STRIP: NEGATIVE
LYMPHOCYTES # BLD AUTO: 1.5 K/UL (ref 1–4.8)
LYMPHOCYTES NFR BLD: 10.1 % (ref 18–48)
MCH RBC QN AUTO: 28.3 PG (ref 27–31)
MCHC RBC AUTO-ENTMCNC: 30.3 G/DL (ref 32–36)
MCV RBC AUTO: 94 FL (ref 82–98)
MICROSCOPIC COMMENT: ABNORMAL
MONOCYTES # BLD AUTO: 0.7 K/UL (ref 0.3–1)
MONOCYTES NFR BLD: 4.8 % (ref 4–15)
NEUTROPHILS # BLD AUTO: 12 K/UL (ref 1.8–7.7)
NEUTROPHILS NFR BLD: 82.6 % (ref 38–73)
NITRITE UR QL STRIP: NEGATIVE
NRBC BLD-RTO: 0 /100 WBC
PH UR STRIP: 7 [PH] (ref 5–8)
PLATELET # BLD AUTO: 570 K/UL (ref 150–350)
PMV BLD AUTO: 9.9 FL (ref 9.2–12.9)
PROT UR QL STRIP: ABNORMAL
RBC # BLD AUTO: 4.06 M/UL (ref 4–5.4)
RBC #/AREA URNS AUTO: >100 /HPF (ref 0–4)
SP GR UR STRIP: 1.02 (ref 1–1.03)
URN SPEC COLLECT METH UR: ABNORMAL
WBC # BLD AUTO: 14.58 K/UL (ref 3.9–12.7)
WBC #/AREA URNS AUTO: 3 /HPF (ref 0–5)

## 2020-03-25 PROCEDURE — 96361 HYDRATE IV INFUSION ADD-ON: CPT

## 2020-03-25 PROCEDURE — 63600175 PHARM REV CODE 636 W HCPCS: Performed by: PHYSICIAN ASSISTANT

## 2020-03-25 PROCEDURE — 85025 COMPLETE CBC W/AUTO DIFF WBC: CPT

## 2020-03-25 PROCEDURE — 99284 EMERGENCY DEPT VISIT MOD MDM: CPT | Mod: 25

## 2020-03-25 PROCEDURE — 99284 PR EMERGENCY DEPT VISIT,LEVEL IV: ICD-10-PCS | Mod: ,,, | Performed by: PHYSICIAN ASSISTANT

## 2020-03-25 PROCEDURE — 96374 THER/PROPH/DIAG INJ IV PUSH: CPT

## 2020-03-25 PROCEDURE — 96375 TX/PRO/DX INJ NEW DRUG ADDON: CPT

## 2020-03-25 PROCEDURE — 81001 URINALYSIS AUTO W/SCOPE: CPT

## 2020-03-25 PROCEDURE — 99284 EMERGENCY DEPT VISIT MOD MDM: CPT | Mod: ,,, | Performed by: PHYSICIAN ASSISTANT

## 2020-03-25 RX ORDER — MORPHINE SULFATE 4 MG/ML
4 INJECTION, SOLUTION INTRAMUSCULAR; INTRAVENOUS
Status: COMPLETED | OUTPATIENT
Start: 2020-03-25 | End: 2020-03-25

## 2020-03-25 RX ORDER — ONDANSETRON 2 MG/ML
4 INJECTION INTRAMUSCULAR; INTRAVENOUS
Status: COMPLETED | OUTPATIENT
Start: 2020-03-25 | End: 2020-03-25

## 2020-03-25 RX ADMIN — MORPHINE SULFATE 4 MG: 4 INJECTION INTRAVENOUS at 10:03

## 2020-03-25 RX ADMIN — SODIUM CHLORIDE 1000 ML: 0.9 INJECTION, SOLUTION INTRAVENOUS at 10:03

## 2020-03-25 RX ADMIN — ONDANSETRON 4 MG: 2 INJECTION INTRAMUSCULAR; INTRAVENOUS at 10:03

## 2020-03-25 NOTE — PHYSICIAN QUERY
PT Name: Edita Riley  MR #: [id number]     Physician Query Form - Diagnosis Clarification      CDS/: Madelin Muse RN, CDS               Contact information: [email or web address]    This form is a permanent document in the medical record.     Query Date: March 25, 2020    By submitting this query, we are merely seeking further clarification of documentation.  Please utilize your independent clinical judgment when addressing the question(s) below.     The medical record contains the following:      Findings Supporting Clinical Information Location in Medical Record   admitted due to possible pyelonephritis      presents to the ED with complaints of mid abdominal pain, nausea, and vomiting for the past 2 weeks.  Patient states she started developing mid abdominal pain over the past two weeks that has now migrated predominantly to her right flank.    patient is afebrile and HD stable.  Labs are remarkable for an elevated WBC to 14k and elevated Cr to 1.9 (baseline around 1.2.)  UA was negative for nitrites or leukocytes but notes 15 WBC.  CT abdomen moderate right-sided hydroureteronephrosis.  No renal stones are visualized.  She was given 2L of NS and 1x dose of IV ceftriaxone    DDx of ODESSA includes but is not limited to: infection given elevated WBC and burning with urination, UA with 15 WBCs vs. Pre-renal given hx of increased N/V and poor PO intake vs. Recurrent cancer given hx of unilateral hydroureteronephrosis in the setting of hx of metastatic cervical cancer and recent weight loss.  Also there is no obvious sign of obstructive stone on imaging to indicate a more likely cause of unilateral hydro.      Urine is positive for infection and CT showed hydronephrosis.  This is c/w right pyelonephritis.    CT done shows new R sided hydroureteronephrosis with no obvious cause.   UA not concerning for infection.     Throughout the bladder base there were areas of heaped up tissue concerning for  bladder tumor versus radiation cystitis    Patient admitted due to possible pyelonephritis in the setting of hydronephrosis that is kidney stone negative on CT and an ODESSA.  Patient started on IV ceftriaxone.  Urology was consulted and preformed a cystoscopy and noted heaped up tissue concerning for bladder tumor versus radiation cystitis.    S/p BL stent placement and TURBT.  Patient's WBC is down trending and her Cr has improved.  Patient's nino catheter was removed and she passed a voiding trial.  Her pain has also improved, and she has remained afebrile.  Patielnt will need folow up in 2 weeks with Dr Quiles to review biopsy taken during the procedure.  Will discharge patient on antibiotics to complete a 5 day course of Levaquin DC Summary 3/22    H&P 3/21            H&P 3/21              H&P 3/21                  H&P 3/21        Urology Consult 3/21      Op Note 3/21        DC Summary 3/22       Due to conflicting documentation, please clarify if the __pyelonephritis__ diagnosis has been:    [x  ] Ruled In   [  ] Ruled Out   [  ] Other/Clarification of findings (please specify):     [  ] Clinically undetermined     Please document in your progress notes daily for the duration of treatment, until resolved, and include in your discharge summary.

## 2020-03-26 LAB
BACTERIA BLD CULT: NORMAL
BACTERIA BLD CULT: NORMAL
BUN SERPL-MCNC: 12 MG/DL (ref 6–30)
CHLORIDE SERPL-SCNC: 102 MMOL/L (ref 95–110)
CREAT SERPL-MCNC: 1.3 MG/DL (ref 0.5–1.4)
FINAL PATHOLOGIC DIAGNOSIS: NORMAL
GLUCOSE SERPL-MCNC: 141 MG/DL (ref 70–110)
GROSS: NORMAL
HCT VFR BLD CALC: 32 %PCV (ref 36–54)
POC IONIZED CALCIUM: 1.12 MMOL/L (ref 1.06–1.42)
POC TCO2 (MEASURED): 28 MMOL/L (ref 23–29)
POTASSIUM BLD-SCNC: 3.7 MMOL/L (ref 3.5–5.1)
SAMPLE: ABNORMAL
SODIUM BLD-SCNC: 141 MMOL/L (ref 136–145)

## 2020-03-26 RX ORDER — ONDANSETRON 4 MG/1
4 TABLET, ORALLY DISINTEGRATING ORAL EVERY 8 HOURS PRN
Qty: 12 TABLET | Refills: 0 | OUTPATIENT
Start: 2020-03-26 | End: 2020-04-19

## 2020-03-26 RX ORDER — CIPROFLOXACIN 500 MG/1
500 TABLET ORAL 2 TIMES DAILY
Qty: 14 TABLET | Refills: 0 | Status: SHIPPED | OUTPATIENT
Start: 2020-03-26 | End: 2020-04-02

## 2020-03-26 NOTE — ED PROVIDER NOTES
Encounter Date: 3/25/2020       History     Chief Complaint   Patient presents with    Post-op Problem     Cystoscopy 3 days ago. Reports increased low back pain not relieved by Rx. +emesis.     Patient is a 55-year-old female with a past medical history of hypertension, fibromyalgia, cervical cancer, pyelonephritis with recent bilateral stent placement, presenting to the emergency department with complaints of bilateral back pain.  Patient states her symptoms started today.  She states that her pain is more significant on the left side.  She denies any fever chills.  She states that she has not had improvement from the medication, but only appears see taking Levaquin.  She reports associated nausea and vomiting.  She has not contacted Urology.  Of note, patient is a poor historian.This is the extent of the patient's complaints at this time.       The history is provided by the patient.     Review of patient's allergies indicates:   Allergen Reactions    Bee sting [allergen ext-venom-honey bee]      Rash      Grass pollen-bermuda, standard      rash     Past Medical History:   Diagnosis Date    Anxiety     Cervical cancer 2014    Chronic back pain     Depression     Diarrhea due to malabsorption 11/14/2018    Fibromyalgia     GERD (gastroesophageal reflux disease)     Hiatal hernia 2014    History of cervical cancer 10/11/2018    Hx of psychiatric care     Cymbalta, trazodone    Hypertension     Hypomagnesemia 11/21/2018    Lactose intolerance     Metastatic squamous cell carcinoma to lymph node 10/11/2018    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 drug 11/14/2018    Osteoarthritis of back     Psychiatric problem     Stroke 1972     Past Surgical History:   Procedure Laterality Date    BILATERAL OOPHORECTOMY  2015    CHOLECYSTECTOMY  11/09/2016    Done at [facility], path showed chronic cholecystitis and gallstones    cold knife conization  2014    COLONOSCOPY  2014    [facility]    COLONOSCOPY N/A 5/18/2018    Procedure: COLONOSCOPY;  Surgeon: Arden Gutiérrez MD;  Location: [facility] (4TH FLR);  Service: Endoscopy;  Laterality: N/A;    CYSTOSCOPY WITH URETEROSCOPY, RETROGRADE PYELOGRAPHY, AND INSERTION OF STENT Bilateral 3/21/2020    Procedure: CYSTOSCOPY, WITH RETROGRADE PYELOGRAM,;  Surgeon: Leslie Balbuena MD;  Location: [facility] OR 1ST FLR;  Service: Urology;  Laterality: Bilateral;    ESOPHAGOGASTRODUODENOSCOPY  2014    HYSTERECTOMY  2014    TVH for cervical cancer    OOPHORECTOMY      RETROPERITONEAL LYMPHADENECTOMY Right 9/17/2018    Procedure: LYMPHADENECTOMY, RETROPERITONEUM;  Surgeon: Sebas Reed MD;  Location: [facility] OR 2ND FLR;  Service: General;  Laterality: Right;  ILIAC     Family History   Problem Relation Age of Onset    Heart disease Sister     Heart disease Maternal Grandmother     Colon cancer Father     Esophageal cancer Father     Cancer Father         Lung-smoker    Cancer Mother         Cervical    Cervical cancer Mother     Breast cancer Maternal Aunt     Suicide Daughter         jumped from parking structure    Drug abuse Daughter     Drug abuse Daughter     Rectal cancer Neg Hx     Stomach cancer Neg Hx     Crohn's disease Neg Hx     Ulcerative colitis Neg Hx     Diabetes Neg Hx     Hypertension Neg Hx      Social History     Tobacco Use    Smoking status: Former Smoker    Smokeless tobacco: Never Used   Substance Use Topics    Alcohol use: No     Alcohol/week: 0.0 standard drinks    Drug use: No     Review of Systems   Constitutional: Negative for activity change, appetite change, chills, fatigue and fever.   HENT: Negative for congestion, rhinorrhea and sore throat.    Eyes: Negative for photophobia and visual disturbance.   Respiratory: Negative for cough, shortness of breath and wheezing.    Cardiovascular: Negative for chest pain.   Gastrointestinal: Positive for abdominal pain, nausea and vomiting.  Negative for diarrhea.   Genitourinary: Negative for dysuria, hematuria and urgency.   Musculoskeletal: Positive for back pain. Negative for myalgias and neck pain.   Skin: Negative for color change and wound.   Neurological: Negative for weakness and headaches.   Psychiatric/Behavioral: Negative for agitation and confusion.       Physical Exam     Initial Vitals [03/25/20 2010]   BP Pulse Resp Temp SpO2   (!) 164/78 101 16 98.1 °F (36.7 °C) 99 %      MAP       --         Physical Exam    Nursing note and vitals reviewed.  Constitutional: She appears well-developed and well-nourished. She is not diaphoretic.  Non-toxic appearance. She does not have a sickly appearance. No distress.   Uncomfortable appearing  female.  She is pacing and moaning on exam.  She is speaking in shortened sentences.  Poor historian.   HENT:   Head: Normocephalic and atraumatic.   Right Ear: External ear normal.   Left Ear: External ear normal.   Nose: Nose normal.   Mouth/Throat: Oropharynx is clear and moist.   Eyes: Conjunctivae and EOM are normal.   Neck: Normal range of motion. Neck supple.   Cardiovascular: Regular rhythm and normal heart sounds. Tachycardia present.    Pulmonary/Chest: Breath sounds normal. No respiratory distress. She has no wheezes.   Abdominal: Soft. Bowel sounds are normal. She exhibits no distension. There is no tenderness. There is no rebound and no guarding.   Musculoskeletal: Normal range of motion.   Neurological: She is alert and oriented to person, place, and time. GCS eye subscore is 4. GCS verbal subscore is 5. GCS motor subscore is 6.   Skin: Skin is warm.   Psychiatric: She has a normal mood and affect. Her behavior is normal. Judgment and thought content normal.         ED Course   Procedures  Labs Reviewed   URINALYSIS, REFLEX TO URINE CULTURE - Abnormal; Notable for the following components:       Result Value    Color, UA Red (*)     Appearance, UA Cloudy (*)     Protein, UA 3+ (*)      Glucose, UA 2+ (*)     Occult Blood UA 3+ (*)     All other components within normal limits    Narrative:     Preferred Collection Type->Urine, Clean Catch   CBC W/ AUTO DIFFERENTIAL - Abnormal; Notable for the following components:    WBC 14.58 (*)     Hemoglobin 11.5 (*)     Mean Corpuscular Hemoglobin Conc 30.3 (*)     RDW 15.9 (*)     Platelets 570 (*)     Immature Granulocytes 0.7 (*)     Gran # (ANC) 12.0 (*)     Immature Grans (Abs) 0.10 (*)     Gran% 82.6 (*)     Lymph% 10.1 (*)     All other components within normal limits   URINALYSIS MICROSCOPIC - Abnormal; Notable for the following components:    RBC, UA >100 (*)     Bacteria Many (*)     All other components within normal limits    Narrative:     Preferred Collection Type->Urine, Clean Catch   ISTAT PROCEDURE - Abnormal; Notable for the following components:    POC Glucose 141 (*)     POC Hematocrit 32 (*)     All other components within normal limits   ISTAT CHEM8             Medical Decision Making:   Initial Assessment:     Urgent evaluation of a 55-year-old female with past medical history of hypertension, fibromyalgia, cervical cancer, pyelonephritis with recent bilateral stent placement, presenting for evaluation of bilateral back pain, nausea, vomiting.  Patient is afebrile, nontoxic appearing, hemodynamically stable.  Physical exam reveals tachycardia, patient is uncomfortable not allow me to fully examine her.  Recent stent placement for bilateral pyelo.  Will plan for labs, analgesics and reassess.    Clinical Tests:   Lab Tests: Reviewed and Ordered  ED Management:    CBC shows leukocytosis at 14.5, H&H 11.5 and 38.  UA shows significant hematuria.  Chemistries pending.  Will plan to discuss with Urology.    11:15 PM Paged Urology    11:20 PM spoke with Urology.  Likely pain secondary to colic from the bilateral stents. States white count is normal post op.  Recommend patient to take Flomax.  In addition, recommend changing her antibiotic  from Levaquin to Cipro.  Chemistry is still pending.  Will insure that the patient's pain is controlled in addition to normal kidney function.    Chemistry was hemolyzed.  I-STAT shows normal kidney function.  At this point, patient does not require further workup.  She is now sitting comfortably.  I explained that she needs to continue taking Flomax for her pain, she is also given prescription for Zofran to help the nausea.  Antibiotic was changed to Cipro.  Discussed home care.  Discharged in stable condition.The patient was instructed to follow up with a primary care provider in 2 days or to return to the emergency department for worsening symptoms. The treatment plan was discussed with the patient who demonstrated understanding and comfort with plan.    This note was created using [employer] Fluency Direct. There may be typographical errors secondary to dictation.                                    Clinical Impression:     1. Flank pain             ED Disposition Condition    Discharge Stable        ED Prescriptions     Medication Sig Dispense Start Date End Date Auth. Provider    ciprofloxacin HCl (CIPRO) 500 MG tablet Take 1 tablet (500 mg total) by mouth 2 (two) times daily. for 7 days 14 tablet 3/26/2020 4/2/2020 Ashley Culver PA-C    ondansetron (ZOFRAN-ODT) 4 MG TbDL Take 1 tablet (4 mg total) by mouth every 8 (eight) hours as needed. 12 tablet 3/26/2020  Ashley Culver PA-C        Follow-up Information     Follow up With Specialties Details Why Contact Info    Audrey Mustafa MD Internal Medicine   [address] [facility] [address]  [phone]      Leslie Balbuena MD Urology   [address]  [place] [address]  [phone]      [facility] Emergency Medicine   [address] [facility] [id number] [phone]                                     Ashley Culver PA-C  03/26/20 0016

## 2020-03-26 NOTE — ED TRIAGE NOTES
55 y F c/o L flank pain that started today, pt unable to sit still. Pain similar in the past with a UTI and kidney infection. She recently had stents placed bilaterally on 3/21. Reports NV, 2 episodes of emesis. Denies fever, body aches, chills, cp, sob.

## 2020-03-26 NOTE — DISCHARGE INSTRUCTIONS
You need to take the Flomax (Tamsulosin) for your pain at home.    Stop taking the levaquin, START taking the Ciprofloxacin    Use the Zofran as needed for nausea

## 2020-03-27 ENCOUNTER — HOSPITAL ENCOUNTER (EMERGENCY)
Facility: HOSPITAL | Age: 55
Discharge: HOME | End: 2020-03-27
Attending: EMERGENCY MEDICINE
Payer: MEDICAID

## 2020-03-27 VITALS
SYSTOLIC BLOOD PRESSURE: 120 MMHG | BODY MASS INDEX: 34.8 KG/M2 | RESPIRATION RATE: 18 BRPM | TEMPERATURE: 98 F | DIASTOLIC BLOOD PRESSURE: 61 MMHG | HEART RATE: 83 BPM | WEIGHT: 235 LBS | OXYGEN SATURATION: 97 % | HEIGHT: 69 IN

## 2020-03-27 DIAGNOSIS — R10.9 FLANK PAIN: Primary | ICD-10-CM

## 2020-03-27 LAB
ALBUMIN SERPL BCP-MCNC: 2.9 G/DL (ref 3.5–5.2)
ALP SERPL-CCNC: 126 U/L (ref 55–135)
ALT SERPL W/O P-5'-P-CCNC: 22 U/L (ref 10–44)
ANION GAP SERPL CALC-SCNC: 12 MMOL/L (ref 8–16)
AST SERPL-CCNC: 31 U/L (ref 10–40)
BACTERIA #/AREA URNS AUTO: ABNORMAL /HPF
BASOPHILS # BLD AUTO: 0.06 K/UL (ref 0–0.2)
BASOPHILS NFR BLD: 0.5 % (ref 0–1.9)
BILIRUB SERPL-MCNC: 0.5 MG/DL (ref 0.1–1)
BILIRUB UR QL STRIP: NEGATIVE
BUN SERPL-MCNC: 14 MG/DL (ref 6–20)
CALCIUM SERPL-MCNC: 9.4 MG/DL (ref 8.7–10.5)
CHLORIDE SERPL-SCNC: 106 MMOL/L (ref 95–110)
CLARITY UR REFRACT.AUTO: ABNORMAL
CO2 SERPL-SCNC: 25 MMOL/L (ref 23–29)
COLOR UR AUTO: ABNORMAL
CREAT SERPL-MCNC: 1.4 MG/DL (ref 0.5–1.4)
DIFFERENTIAL METHOD: ABNORMAL
EOSINOPHIL # BLD AUTO: 0.2 K/UL (ref 0–0.5)
EOSINOPHIL NFR BLD: 1.8 % (ref 0–8)
ERYTHROCYTE [DISTWIDTH] IN BLOOD BY AUTOMATED COUNT: 15.4 % (ref 11.5–14.5)
EST. GFR  (AFRICAN AMERICAN): 48.1 ML/MIN/1.73 M^2
EST. GFR  (NON AFRICAN AMERICAN): 41.7 ML/MIN/1.73 M^2
GLUCOSE SERPL-MCNC: 133 MG/DL (ref 70–110)
GLUCOSE UR QL STRIP: ABNORMAL
HCT VFR BLD AUTO: 34.1 % (ref 37–48.5)
HGB BLD-MCNC: 10.5 G/DL (ref 12–16)
HGB UR QL STRIP: ABNORMAL
HYALINE CASTS UR QL AUTO: 0 /LPF
IMM GRANULOCYTES # BLD AUTO: 0.04 K/UL (ref 0–0.04)
IMM GRANULOCYTES NFR BLD AUTO: 0.4 % (ref 0–0.5)
KETONES UR QL STRIP: NEGATIVE
LEUKOCYTE ESTERASE UR QL STRIP: NEGATIVE
LIPASE SERPL-CCNC: 19 U/L (ref 4–60)
LYMPHOCYTES # BLD AUTO: 1.7 K/UL (ref 1–4.8)
LYMPHOCYTES NFR BLD: 14.9 % (ref 18–48)
MCH RBC QN AUTO: 28.4 PG (ref 27–31)
MCHC RBC AUTO-ENTMCNC: 30.8 G/DL (ref 32–36)
MCV RBC AUTO: 92 FL (ref 82–98)
MICROSCOPIC COMMENT: ABNORMAL
MONOCYTES # BLD AUTO: 0.7 K/UL (ref 0.3–1)
MONOCYTES NFR BLD: 5.7 % (ref 4–15)
NEUTROPHILS # BLD AUTO: 8.8 K/UL (ref 1.8–7.7)
NEUTROPHILS NFR BLD: 76.7 % (ref 38–73)
NITRITE UR QL STRIP: NEGATIVE
NRBC BLD-RTO: 0 /100 WBC
PH UR STRIP: 7 [PH] (ref 5–8)
PLATELET # BLD AUTO: 576 K/UL (ref 150–350)
PMV BLD AUTO: 9.5 FL (ref 9.2–12.9)
POTASSIUM SERPL-SCNC: 4.1 MMOL/L (ref 3.5–5.1)
PROT SERPL-MCNC: 7.5 G/DL (ref 6–8.4)
PROT UR QL STRIP: ABNORMAL
RBC # BLD AUTO: 3.7 M/UL (ref 4–5.4)
RBC #/AREA URNS AUTO: >100 /HPF (ref 0–4)
SODIUM SERPL-SCNC: 143 MMOL/L (ref 136–145)
SP GR UR STRIP: 1.02 (ref 1–1.03)
SQUAMOUS #/AREA URNS AUTO: 2 /HPF
URN SPEC COLLECT METH UR: ABNORMAL
WBC # BLD AUTO: 11.42 K/UL (ref 3.9–12.7)
WBC #/AREA URNS AUTO: 1 /HPF (ref 0–5)

## 2020-03-27 PROCEDURE — 63600175 PHARM REV CODE 636 W HCPCS: Performed by: EMERGENCY MEDICINE

## 2020-03-27 PROCEDURE — 81001 URINALYSIS AUTO W/SCOPE: CPT

## 2020-03-27 PROCEDURE — 80053 COMPREHEN METABOLIC PANEL: CPT

## 2020-03-27 PROCEDURE — 99284 EMERGENCY DEPT VISIT MOD MDM: CPT | Mod: 25

## 2020-03-27 PROCEDURE — 96374 THER/PROPH/DIAG INJ IV PUSH: CPT

## 2020-03-27 PROCEDURE — 85025 COMPLETE CBC W/AUTO DIFF WBC: CPT

## 2020-03-27 PROCEDURE — 99285 PR EMERGENCY DEPT VISIT,LEVEL V: ICD-10-PCS | Mod: ,,, | Performed by: EMERGENCY MEDICINE

## 2020-03-27 PROCEDURE — 96372 THER/PROPH/DIAG INJ SC/IM: CPT | Mod: 59

## 2020-03-27 PROCEDURE — 83690 ASSAY OF LIPASE: CPT

## 2020-03-27 PROCEDURE — 99285 EMERGENCY DEPT VISIT HI MDM: CPT | Mod: ,,, | Performed by: EMERGENCY MEDICINE

## 2020-03-27 RX ORDER — KETOROLAC TROMETHAMINE 30 MG/ML
15 INJECTION, SOLUTION INTRAMUSCULAR; INTRAVENOUS
Status: COMPLETED | OUTPATIENT
Start: 2020-03-27 | End: 2020-03-27

## 2020-03-27 RX ORDER — MORPHINE SULFATE 4 MG/ML
4 INJECTION, SOLUTION INTRAMUSCULAR; INTRAVENOUS
Status: COMPLETED | OUTPATIENT
Start: 2020-03-27 | End: 2020-03-27

## 2020-03-27 RX ADMIN — KETOROLAC TROMETHAMINE 15 MG: 30 INJECTION, SOLUTION INTRAMUSCULAR; INTRAVENOUS at 02:03

## 2020-03-27 RX ADMIN — MORPHINE SULFATE 4 MG: 4 INJECTION, SOLUTION INTRAMUSCULAR; INTRAVENOUS at 02:03

## 2020-03-27 NOTE — ED NOTES
LOC: The patient is awake and alert; oriented x 3 and speaking appropriately.  APPEARANCE: Patient restless w/ flank and abdominal pain , patient is clean and well groomed  SKIN: warm and dry, normal skin turgor & moist mucus membranes, skin intact, no breakdown noted.  MUSCULOSKELETAL: Patient moving all extremities well, no obvious swelling or deformities noted  RESPIRATORY: Airway is open and patent, ; respirations are spontaneous, normal effort and rate  CARDIAC: Patient has a normal rate, no peripheral edema noted, capillary refill < 3 seconds; No complaints of chest pain   ABDOMEN: Soft and non tender to palpation, no distention noted. Bowel sounds present x 4

## 2020-03-27 NOTE — ED TRIAGE NOTES
C/o flank pain on both sides w. N/v and hematuria. Denies fever or cold s/s. Recently has a stent placed in ureter.

## 2020-03-28 ENCOUNTER — TELEPHONE (OUTPATIENT)
Dept: GYNECOLOGIC ONCOLOGY | Facility: HOSPITAL | Age: 55
End: 2020-03-28

## 2020-03-28 RX ORDER — DOCUSATE SODIUM 100 MG/1
100 CAPSULE, LIQUID FILLED ORAL 2 TIMES DAILY PRN
Qty: 30 CAPSULE | Refills: 0 | Status: ON HOLD | OUTPATIENT
Start: 2020-03-28 | End: 2020-08-15 | Stop reason: HOSPADM

## 2020-03-28 RX ORDER — OXYBUTYNIN CHLORIDE 5 MG/1
5 TABLET ORAL 3 TIMES DAILY PRN
Qty: 90 TABLET | Refills: 0 | Status: SHIPPED | OUTPATIENT
Start: 2020-03-28 | End: 2020-05-04

## 2020-03-29 NOTE — TELEPHONE ENCOUNTER
Patient called stating she is having severe bilateral back pain and emesis. Patient had recent bilateral stent placement on 3/21/20. She was seen in the ED yesterday for similar complaints and was prescribed flomax. Her antibiotic was also changed from levaquin to cipro at the visit. Patient states that she was able to tolerate her breakfast this morning, but has had a few episodes of emesis since then. Her temp is 97.7. She has taken tylenol 650mg, zofran 4mg x2, cipro, and flomax today.     I counseled the patient and her daughter about flank pain after stent placement. I sent oxybutynin and a stool softener to her pharmacy. I also informed her she could take the zofran every 6 hours for nausea. I encouraged her to continue taking daily flomax, cipro, and scheduled tylenol 650mg q6h for pain.     Cynthia Díaz MD  OBGYN, PGY-2

## 2020-03-29 NOTE — ED PROVIDER NOTES
Encounter Date: 3/27/2020       History     Chief Complaint   Patient presents with    Flank Pain     'hx kidney problems,hx uti, seen 2d ago at pcp     HPI     This is a 55-year-old woman with a history of chronic back pain, fibromyalgia, pyelonephritis and hydronephrosis status post ureteral strictures who presents with acute worsening of her bilateral flank pain.  She denies any new fevers, chills, difficulty urinating, reports the pain radiates around to the front from her bilateral flanks.  She has been taking pain pills at home and has not been helping.  Review of patient's allergies indicates:   Allergen Reactions    Bee sting [allergen ext-venom-honey bee]      Rash      Grass pollen-bermuda, standard      rash     Past Medical History:   Diagnosis Date    Anxiety     Cervical cancer 2014    Chronic back pain     Depression     Diarrhea due to malabsorption 11/14/2018    Fibromyalgia     GERD (gastroesophageal reflux disease)     Hiatal hernia 2014    History of cervical cancer 10/11/2018    Hx of psychiatric care     Cymbalta, trazodone    Hypertension     Hypomagnesemia 11/21/2018    Lactose intolerance     Metastatic squamous cell carcinoma to lymph node 10/11/2018    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 drug 11/14/2018    Osteoarthritis of back     Psychiatric problem     Stroke 1972     Past Surgical History:   Procedure Laterality Date    BILATERAL OOPHORECTOMY  2015    CHOLECYSTECTOMY  11/09/2016    Done at [facility], path showed chronic cholecystitis and gallstones    cold knife conization  2014    COLONOSCOPY  2014    COLONOSCOPY N/A 10/24/2016    at [facility], Dr Thomas    COLONOSCOPY N/A 5/18/2018    Procedure: COLONOSCOPY;  Surgeon: Arden Gutiérrez MD;  Location: [address]);  Service: Endoscopy;  Laterality: N/A;    CYSTOSCOPY WITH URETEROSCOPY, RETROGRADE PYELOGRAPHY, AND INSERTION OF STENT Bilateral 3/21/2020    Procedure: CYSTOSCOPY, WITH RETROGRADE PYELOGRAM,;   Surgeon: Leslie Balbuena MD;  Location: [facility] OR 1ST FLR;  Service: Urology;  Laterality: Bilateral;    ESOPHAGOGASTRODUODENOSCOPY  2014    HYSTERECTOMY  2014    TVH for cervical cancer    OOPHORECTOMY      RETROPERITONEAL LYMPHADENECTOMY Right 9/17/2018    Procedure: LYMPHADENECTOMY, RETROPERITONEUM;  Surgeon: Sebas Reed MD;  Location: [facility] OR 2ND FLR;  Service: General;  Laterality: Right;  ILIAC     Family History   Problem Relation Age of Onset    Heart disease Sister     Heart disease Maternal Grandmother     Colon cancer Father     Esophageal cancer Father     Cancer Father         Lung-smoker    Cancer Mother         Cervical    Cervical cancer Mother     Breast cancer Maternal Aunt     Suicide Daughter         jumped from parking structure    Drug abuse Daughter     Drug abuse Daughter     Rectal cancer Neg Hx     Stomach cancer Neg Hx     Crohn's disease Neg Hx     Ulcerative colitis Neg Hx     Diabetes Neg Hx     Hypertension Neg Hx      Social History     Tobacco Use    Smoking status: Former Smoker    Smokeless tobacco: Never Used   Substance Use Topics    Alcohol use: No     Alcohol/week: 0.0 standard drinks    Drug use: No     Review of Systems   Constitutional: Negative for chills, diaphoresis, fatigue and fever.   HENT: Negative for congestion, rhinorrhea and sore throat.    Eyes: Negative for visual disturbance.   Respiratory: Negative for cough, chest tightness and shortness of breath.    Cardiovascular: Negative for chest pain.   Gastrointestinal: Negative for abdominal pain, blood in stool, constipation, diarrhea and vomiting.   Genitourinary: Positive for flank pain. Negative for dysuria, hematuria and urgency.   Musculoskeletal: Negative for back pain.   Skin: Negative for rash.   Neurological: Negative for seizures and syncope.   Hematological: Does not bruise/bleed easily.   Psychiatric/Behavioral: Negative for agitation and hallucinations.       Physical  Exam     Initial Vitals [03/27/20 1329]   BP Pulse Resp Temp SpO2   (!) 163/85 98 18 97.4 °F (36.3 °C) 99 %      MAP       --         Physical Exam  Gen: AxOx3, leaning forward in the chair, moaning, appears uncomfortable, well nourished  Eye: sclera anicteric, EOMI, no conjunctivitis, no periorbital edema  ENT: NCAT, OP clear, neck supple with FROM, no stridor  CVS: RRR, no m/r/g, distal pulses intact/symmetric  Pulm: CTAB, no wheezes, rales or rhonchi, no increased work of breathing  Abd: soft, nontender, nondistended, no organomegaly, there is tenderness to palpation even to light touch over the bilateral CVAs.  Ext: no edema, lesions, rashes, or deformity  Neuro: GCS15, moving all extremities, gait intact  Psych: normal affect, cooperative  ED Course   Procedures  Labs Reviewed   CBC W/ AUTO DIFFERENTIAL - Abnormal; Notable for the following components:       Result Value    RBC 3.70 (*)     Hemoglobin 10.5 (*)     Hematocrit 34.1 (*)     Mean Corpuscular Hemoglobin Conc 30.8 (*)     RDW 15.4 (*)     Platelets 576 (*)     Gran # (ANC) 8.8 (*)     Gran% 76.7 (*)     Lymph% 14.9 (*)     All other components within normal limits   COMPREHENSIVE METABOLIC PANEL - Abnormal; Notable for the following components:    Glucose 133 (*)     Albumin 2.9 (*)     eGFR if  48.1 (*)     eGFR if non  41.7 (*)     All other components within normal limits   URINALYSIS, REFLEX TO URINE CULTURE - Abnormal; Notable for the following components:    Appearance, UA Cloudy (*)     Protein, UA 3+ (*)     Glucose, UA 1+ (*)     Occult Blood UA 2+ (*)     All other components within normal limits    Narrative:     Preferred Collection Type->Urine, Clean Catch   URINALYSIS MICROSCOPIC - Abnormal; Notable for the following components:    RBC, UA >100 (*)     Bacteria Few (*)     All other components within normal limits    Narrative:     Preferred Collection Type->Urine, Clean Catch   LIPASE          Imaging  Results    None          Medical Decision Making:   History:   Old Medical Records: I decided to obtain old medical records.  Old Records Summarized: records from clinic visits and records from previous admission(s).  Initial Assessment:   This is a 55-year-old woman with a history of bilateral hydronephrosis status post ureteral stents, chronic back pain, who presents with acute worsening her pain despite home opiate therapy.  She is afebrile, still able to produce urine, I have overall low suspicion for recurrent pyelonephritis or infected stents.  I suspect she is having some degree of ureteral spasm secondary to stents.  We will check labs including CBC, CMP, and urinalysis to further evaluate.  We will administer IV pain medication.  Clinical Tests:   Lab Tests: Ordered and Reviewed  ED Management:  Patient's labs are reassuring, do not show signs of acute kidney injury, electrolyte abnormality, or urinary tract infection.  Her pain completely resolved after a single dose of IV pain medication.  I suspect she just had gotten behind on her pain medications at home.  I recommended that she follow-up with her urologist for further management as an outpatient.                                 Clinical Impression:       ICD-10-CM ICD-9-CM   1. Flank pain R10.9 789.09             ED Disposition Condition    Discharge Stable        ED Prescriptions     None        Follow-up Information     Follow up With Specialties Details Why Contact Info    Audrey Mustafa MD Internal Medicine Schedule an appointment as soon as possible for a visit   [address] [facility] [address]  [phone]      [facility] Emergency Medicine  If symptoms worsen [address] [facility] [id number] [phone]                                     Betty Langston MD  03/28/20 1947

## 2020-04-14 ENCOUNTER — TELEPHONE (OUTPATIENT)
Dept: ADMINISTRATIVE | Facility: OTHER | Age: 55
End: 2020-04-14

## 2020-04-19 ENCOUNTER — HOSPITAL ENCOUNTER (EMERGENCY)
Facility: HOSPITAL | Age: 55
Discharge: HOME | End: 2020-04-19
Attending: EMERGENCY MEDICINE
Payer: MEDICAID

## 2020-04-19 VITALS
DIASTOLIC BLOOD PRESSURE: 90 MMHG | RESPIRATION RATE: 18 BRPM | OXYGEN SATURATION: 96 % | WEIGHT: 235 LBS | BODY MASS INDEX: 34.7 KG/M2 | HEART RATE: 96 BPM | TEMPERATURE: 98 F | SYSTOLIC BLOOD PRESSURE: 170 MMHG

## 2020-04-19 DIAGNOSIS — R10.30 LOWER ABDOMINAL PAIN: Primary | ICD-10-CM

## 2020-04-19 DIAGNOSIS — N30.40 CYSTITIS, RADIATION: ICD-10-CM

## 2020-04-19 LAB
ALBUMIN SERPL BCP-MCNC: 3.6 G/DL (ref 3.5–5.2)
ALP SERPL-CCNC: 110 U/L (ref 55–135)
ALT SERPL W/O P-5'-P-CCNC: 27 U/L (ref 10–44)
ANION GAP SERPL CALC-SCNC: 11 MMOL/L (ref 8–16)
AST SERPL-CCNC: 21 U/L (ref 10–40)
BACTERIA #/AREA URNS AUTO: ABNORMAL /HPF
BASOPHILS # BLD AUTO: 0.02 K/UL (ref 0–0.2)
BASOPHILS NFR BLD: 0.2 % (ref 0–1.9)
BILIRUB SERPL-MCNC: 0.5 MG/DL (ref 0.1–1)
BILIRUB UR QL STRIP: NEGATIVE
BUN SERPL-MCNC: 10 MG/DL (ref 6–20)
CALCIUM SERPL-MCNC: 9.4 MG/DL (ref 8.7–10.5)
CHLORIDE SERPL-SCNC: 103 MMOL/L (ref 95–110)
CLARITY UR REFRACT.AUTO: CLEAR
CO2 SERPL-SCNC: 27 MMOL/L (ref 23–29)
COLOR UR AUTO: YELLOW
CREAT SERPL-MCNC: 1.4 MG/DL (ref 0.5–1.4)
DIFFERENTIAL METHOD: ABNORMAL
EOSINOPHIL # BLD AUTO: 0.2 K/UL (ref 0–0.5)
EOSINOPHIL NFR BLD: 2 % (ref 0–8)
ERYTHROCYTE [DISTWIDTH] IN BLOOD BY AUTOMATED COUNT: 14.5 % (ref 11.5–14.5)
EST. GFR  (AFRICAN AMERICAN): 48.1 ML/MIN/1.73 M^2
EST. GFR  (NON AFRICAN AMERICAN): 41.7 ML/MIN/1.73 M^2
GLUCOSE SERPL-MCNC: 136 MG/DL (ref 70–110)
GLUCOSE UR QL STRIP: NEGATIVE
HCT VFR BLD AUTO: 37.7 % (ref 37–48.5)
HGB BLD-MCNC: 11.6 G/DL (ref 12–16)
HGB UR QL STRIP: ABNORMAL
HYALINE CASTS UR QL AUTO: 0 /LPF
IMM GRANULOCYTES # BLD AUTO: 0.01 K/UL (ref 0–0.04)
IMM GRANULOCYTES NFR BLD AUTO: 0.1 % (ref 0–0.5)
KETONES UR QL STRIP: NEGATIVE
LEUKOCYTE ESTERASE UR QL STRIP: ABNORMAL
LIPASE SERPL-CCNC: 11 U/L (ref 4–60)
LYMPHOCYTES # BLD AUTO: 1.6 K/UL (ref 1–4.8)
LYMPHOCYTES NFR BLD: 18.9 % (ref 18–48)
MCH RBC QN AUTO: 28.3 PG (ref 27–31)
MCHC RBC AUTO-ENTMCNC: 30.8 G/DL (ref 32–36)
MCV RBC AUTO: 92 FL (ref 82–98)
MICROSCOPIC COMMENT: ABNORMAL
MONOCYTES # BLD AUTO: 0.6 K/UL (ref 0.3–1)
MONOCYTES NFR BLD: 7.6 % (ref 4–15)
NEUTROPHILS # BLD AUTO: 6 K/UL (ref 1.8–7.7)
NEUTROPHILS NFR BLD: 71.2 % (ref 38–73)
NITRITE UR QL STRIP: NEGATIVE
NRBC BLD-RTO: 0 /100 WBC
PH UR STRIP: 8 [PH] (ref 5–8)
PLATELET # BLD AUTO: 293 K/UL (ref 150–350)
PMV BLD AUTO: 9.3 FL (ref 9.2–12.9)
POTASSIUM SERPL-SCNC: 3.6 MMOL/L (ref 3.5–5.1)
PROT SERPL-MCNC: 7.7 G/DL (ref 6–8.4)
PROT UR QL STRIP: ABNORMAL
RBC # BLD AUTO: 4.1 M/UL (ref 4–5.4)
RBC #/AREA URNS AUTO: 69 /HPF (ref 0–4)
SODIUM SERPL-SCNC: 141 MMOL/L (ref 136–145)
SP GR UR STRIP: 1.02 (ref 1–1.03)
SQUAMOUS #/AREA URNS AUTO: 0 /HPF
URN SPEC COLLECT METH UR: ABNORMAL
WBC # BLD AUTO: 8.46 K/UL (ref 3.9–12.7)
WBC #/AREA URNS AUTO: 8 /HPF (ref 0–5)

## 2020-04-19 PROCEDURE — 80053 COMPREHEN METABOLIC PANEL: CPT

## 2020-04-19 PROCEDURE — 25500020 PHARM REV CODE 255: Performed by: EMERGENCY MEDICINE

## 2020-04-19 PROCEDURE — 83690 ASSAY OF LIPASE: CPT

## 2020-04-19 PROCEDURE — 81001 URINALYSIS AUTO W/SCOPE: CPT

## 2020-04-19 PROCEDURE — 96361 HYDRATE IV INFUSION ADD-ON: CPT

## 2020-04-19 PROCEDURE — 99285 EMERGENCY DEPT VISIT HI MDM: CPT | Mod: 25

## 2020-04-19 PROCEDURE — 25000003 PHARM REV CODE 250: Performed by: PHYSICIAN ASSISTANT

## 2020-04-19 PROCEDURE — 85025 COMPLETE CBC W/AUTO DIFF WBC: CPT

## 2020-04-19 PROCEDURE — 99284 PR EMERGENCY DEPT VISIT,LEVEL IV: ICD-10-PCS | Mod: ,,, | Performed by: PHYSICIAN ASSISTANT

## 2020-04-19 PROCEDURE — 99284 EMERGENCY DEPT VISIT MOD MDM: CPT | Mod: ,,, | Performed by: PHYSICIAN ASSISTANT

## 2020-04-19 PROCEDURE — 96375 TX/PRO/DX INJ NEW DRUG ADDON: CPT

## 2020-04-19 PROCEDURE — 63600175 PHARM REV CODE 636 W HCPCS: Performed by: PHYSICIAN ASSISTANT

## 2020-04-19 PROCEDURE — 96374 THER/PROPH/DIAG INJ IV PUSH: CPT

## 2020-04-19 PROCEDURE — 63600175 PHARM REV CODE 636 W HCPCS: Performed by: EMERGENCY MEDICINE

## 2020-04-19 RX ORDER — ONDANSETRON 4 MG/1
4 TABLET, ORALLY DISINTEGRATING ORAL EVERY 8 HOURS PRN
Qty: 15 TABLET | Refills: 0 | Status: SHIPPED | OUTPATIENT
Start: 2020-04-19 | End: 2020-04-19 | Stop reason: SDUPTHER

## 2020-04-19 RX ORDER — ONDANSETRON 4 MG/1
4 TABLET, ORALLY DISINTEGRATING ORAL EVERY 8 HOURS PRN
Qty: 15 TABLET | Refills: 0 | Status: SHIPPED | OUTPATIENT
Start: 2020-04-19 | End: 2020-05-04

## 2020-04-19 RX ORDER — ONDANSETRON 2 MG/ML
4 INJECTION INTRAMUSCULAR; INTRAVENOUS
Status: COMPLETED | OUTPATIENT
Start: 2020-04-19 | End: 2020-04-19

## 2020-04-19 RX ORDER — KETOROLAC TROMETHAMINE 30 MG/ML
10 INJECTION, SOLUTION INTRAMUSCULAR; INTRAVENOUS
Status: DISCONTINUED | OUTPATIENT
Start: 2020-04-19 | End: 2020-04-19

## 2020-04-19 RX ORDER — METOCLOPRAMIDE HYDROCHLORIDE 5 MG/ML
10 INJECTION INTRAMUSCULAR; INTRAVENOUS
Status: COMPLETED | OUTPATIENT
Start: 2020-04-19 | End: 2020-04-19

## 2020-04-19 RX ORDER — HYDROCODONE BITARTRATE AND ACETAMINOPHEN 5; 325 MG/1; MG/1
1 TABLET ORAL
Status: COMPLETED | OUTPATIENT
Start: 2020-04-19 | End: 2020-04-19

## 2020-04-19 RX ADMIN — HYDROCODONE BITARTRATE AND ACETAMINOPHEN 1 TABLET: 5; 325 TABLET ORAL at 04:04

## 2020-04-19 RX ADMIN — ONDANSETRON 4 MG: 2 INJECTION INTRAMUSCULAR; INTRAVENOUS at 03:04

## 2020-04-19 RX ADMIN — KETOROLAC TROMETHAMINE 10 MG: 30 INJECTION, SOLUTION INTRAMUSCULAR; INTRAVENOUS at 03:04

## 2020-04-19 RX ADMIN — METOCLOPRAMIDE 10 MG: 5 INJECTION, SOLUTION INTRAMUSCULAR; INTRAVENOUS at 04:04

## 2020-04-19 RX ADMIN — IOHEXOL 100 ML: 350 INJECTION, SOLUTION INTRAVENOUS at 05:04

## 2020-04-19 RX ADMIN — SODIUM CHLORIDE, SODIUM LACTATE, POTASSIUM CHLORIDE, AND CALCIUM CHLORIDE 1000 ML: .6; .31; .03; .02 INJECTION, SOLUTION INTRAVENOUS at 03:04

## 2020-04-19 NOTE — ED PROVIDER NOTES
Encounter Date: 4/19/2020       History     Chief Complaint   Patient presents with    Abdominal Pain     x 2 weeks. Nausea and vomiting for the past several days     55-year-old female with multiple comorbidities including history of metastatic cervical cancer s/p hysterectomy, hypertension, GERD, fibromyalgia presents for low abdominal pain for about 2 weeks.  Pain is been constant and feels like someone hit me with a brick.  She denies any palliating factors, did not take any medications prior to arrival.  States that pain is worse when she eats greasy food.  He reports associated nausea, multiple episodes of emesis daily bilateral flank pain and shortness of breath.  She denies bloody or coffee-ground appearance of vomit, changes in bowel movements, urinary symptoms, chest pain, fevers or cough.        Review of patient's allergies indicates:   Allergen Reactions    Bee sting [allergen ext-venom-honey bee]      Rash      Grass pollen-bermuda, standard      rash     Past Medical History:   Diagnosis Date    Anxiety     Cervical cancer 2014    Chronic back pain     Depression     Diarrhea due to malabsorption 11/14/2018    Fibromyalgia     GERD (gastroesophageal reflux disease)     Hiatal hernia 2014    History of cervical cancer 10/11/2018    Hx of psychiatric care     Cymbalta, trazodone    Hypertension     Hypomagnesemia 11/21/2018    Lactose intolerance     Metastatic squamous cell carcinoma to lymph node 10/11/2018    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 drug 11/14/2018    Osteoarthritis of back     Psychiatric problem     Stroke 1972     Past Surgical History:   Procedure Laterality Date    BILATERAL OOPHORECTOMY  2015    CHOLECYSTECTOMY  11/09/2016    Done at [facility], path showed chronic cholecystitis and gallstones    cold knife conization  2014    COLONOSCOPY  2014    COLONOSCOPY N/A 10/24/2016    at [facility], Dr Thomas    COLONOSCOPY N/A 5/18/2018    Procedure: COLONOSCOPY;   Surgeon: Arden Gutiérrez MD;  Location: [facility] ENDO (4TH FLR);  Service: Endoscopy;  Laterality: N/A;    CYSTOSCOPY WITH URETEROSCOPY, RETROGRADE PYELOGRAPHY, AND INSERTION OF STENT Bilateral 3/21/2020    Procedure: CYSTOSCOPY, WITH RETROGRADE PYELOGRAM,;  Surgeon: Leslie Balbuena MD;  Location: [facility] OR 1ST FLR;  Service: Urology;  Laterality: Bilateral;    ESOPHAGOGASTRODUODENOSCOPY  2014    HYSTERECTOMY  2014    TVH for cervical cancer    OOPHORECTOMY      RETROPERITONEAL LYMPHADENECTOMY Right 9/17/2018    Procedure: LYMPHADENECTOMY, RETROPERITONEUM;  Surgeon: Sebas Reed MD;  Location: [facility] OR 2ND FLR;  Service: General;  Laterality: Right;  ILIAC     Family History   Problem Relation Age of Onset    Heart disease Sister     Heart disease Maternal Grandmother     Colon cancer Father     Esophageal cancer Father     Cancer Father         Lung-smoker    Cancer Mother         Cervical    Cervical cancer Mother     Breast cancer Maternal Aunt     Suicide Daughter         jumped from parking structure    Drug abuse Daughter     Drug abuse Daughter     Rectal cancer Neg Hx     Stomach cancer Neg Hx     Crohn's disease Neg Hx     Ulcerative colitis Neg Hx     Diabetes Neg Hx     Hypertension Neg Hx      Social History     Tobacco Use    Smoking status: Former Smoker    Smokeless tobacco: Never Used   Substance Use Topics    Alcohol use: No     Alcohol/week: 0.0 standard drinks    Drug use: No     Review of Systems   Constitutional: Negative for chills, diaphoresis, fatigue and fever.   HENT: Negative for sore throat.    Respiratory: Positive for shortness of breath. Negative for cough.    Cardiovascular: Negative for chest pain.   Gastrointestinal: Positive for abdominal pain, nausea and vomiting. Negative for abdominal distention, anal bleeding, blood in stool, constipation, diarrhea and rectal pain.   Genitourinary: Positive for flank pain and pelvic pain. Negative for difficulty  urinating, dysuria, urgency, vaginal bleeding and vaginal discharge.   Musculoskeletal: Positive for back pain. Negative for myalgias.   Skin: Negative for rash.   Neurological: Negative for weakness, light-headedness and headaches.   Hematological: Does not bruise/bleed easily.       Physical Exam     Initial Vitals [04/19/20 0329]   BP Pulse Resp Temp SpO2   (!) 166/93 95 17 97.5 °F (36.4 °C) 96 %      MAP       --         Physical Exam    Nursing note and vitals reviewed.  Constitutional: She appears well-developed and well-nourished. She is not diaphoretic. No distress.   HENT:   Head: Normocephalic and atraumatic.   Eyes: EOM are normal. Pupils are equal, round, and reactive to light.   Neck: Normal range of motion.   Cardiovascular: Normal rate, regular rhythm, normal heart sounds and intact distal pulses. Exam reveals no gallop and no friction rub.    No murmur heard.  Pulmonary/Chest: Breath sounds normal. No respiratory distress. She has no wheezes. She has no rhonchi. She has no rales. She exhibits no tenderness.   Abdominal: Soft. Bowel sounds are normal. She exhibits no distension and no mass. There is generalized tenderness and tenderness in the suprapubic area. There is no rebound and no guarding.   Musculoskeletal: Normal range of motion.   Neurological: She is alert and oriented to person, place, and time.   Skin: Skin is warm and dry.   Psychiatric: She has a normal mood and affect.         ED Course   Procedures  Labs Reviewed   CBC W/ AUTO DIFFERENTIAL - Abnormal; Notable for the following components:       Result Value    Hemoglobin 11.6 (*)     Mean Corpuscular Hemoglobin Conc 30.8 (*)     All other components within normal limits   COMPREHENSIVE METABOLIC PANEL - Abnormal; Notable for the following components:    Glucose 136 (*)     eGFR if  48.1 (*)     eGFR if non  41.7 (*)     All other components within normal limits   LIPASE   URINALYSIS, REFLEX TO URINE  CULTURE          Imaging Results          CT Abdomen Pelvis With Contrast (Final result)  Result time 04/19/20 05:21:42    Final result by Fransisco Richards MD (04/19/20 05:21:42)                 Impression:      1.  Interval placement of bilateral double-J ureteral stents with mild bilateral hydronephrosis.  This appears improved on the right and new on the left compared to prior study.  Additionally, there is mild urothelial enhancement and periureteral inflammatory change.  Clinical correlation with urinalysis for superimposed infectious process is advised.    2.  Nonspecific soft tissue thickening at the base of the bladder, right greater than left.  Malignancy/recurrence not excluded in light of patient's medical history.  Correlation with reported recent cystoscopy and tissue sampling results advised.    3.  Small volume of nonspecific free fluid within the pelvis.    4.  Additional findings as above.      Electronically signed by: Fransisco Richards MD  Date:    04/19/2020  Time:    05:21             Narrative:    EXAMINATION:  CT ABDOMEN PELVIS WITH CONTRAST    CLINICAL HISTORY:  LLQ pain, suspect diverticulitis;RLQ pain, appendicitis suspected;Low abd pain +N/V. History metastatical cervical cancer s/p hysterectomy;    TECHNIQUE:  Low dose axial images, sagittal and coronal reformations were obtained from the lung bases to the pubic symphysis following the IV administration of 100 mL of Omnipaque 350 .  Oral contrast was not given.    COMPARISON:  CT 03/21/2020    FINDINGS:  The lung bases are unremarkable.  There is no pleural fluid present.  The visualized portions of the heart appear normal.    The liver demonstrates decreased attenuation of the hepatic parenchyma suggesting hepatic steatosis.  No new focal hepatic abnormality appreciated.  The portal vein is patent.  The gallbladder is surgically absent.  There is no intra-or extrahepatic biliary ductal dilatation.    The stomach, spleen, pancreas, and  adrenal glands are unremarkable.    The kidneys are normal in size and location.  There are bilateral double-J ureteral stents now present.  There is mild right-sided hydroureteronephrosis, improved from prior examination.  There is mild left-sided hydroureteronephrosis, new from prior examination.  There is mild urothelial enhancement as well as periureteral inflammatory change.  Small focus of air is noted within the urinary bladder lumen which may relate to recent catheterization.  There is nonspecific soft tissue thickening at the base of the bladder, right greater than left, measuring up to approximately 1.5 cm in maximal thickness (axial series 2, image 79).  There is a small volume of free fluid present within the pelvis.  The uterus is surgically absent.  No new bulky pelvic lymphadenopathy appreciated.    The abdominal aorta is normal in course and caliber without significant atherosclerotic calcifications.    The visualized loops of small and large bowel show no evidence of obstruction or inflammation.  No CT evidence to suggest acute appendicitis.  There is no portal venous gas or free intraperitoneal air.    When viewed with bone windows the osseous structures are unremarkable.  The extraperitoneal soft tissues are unremarkable.                                 Medical Decision Making:   History:   Old Medical Records: I decided to obtain old medical records.  Old Records Summarized: records from previous admission(s).       <> Summary of Records: Patient had cystoscopy and bilateral ureteral stent placement performed on 03/21/2020 by Dr. Balbuena for hydronephrosis with ureteral strictures.    Findings:   - Throughout the bladder base there were areas of heaped up tissue concerning for bladder tumor versus radiation cystitis.  The areas lateral to each UO were resected and tissue was sent for pathology  - the right UO was very difficult to find due to a heaped up tissue in the area of the UO, it was  cannulated using a motion wire in an Albarrans bridge with a 70 degree lens   - decision was made to place a left ureteral stent in addition to a right ureteral stent due to COVID precautions - negating the need to return to the hospital in the case of left hydronephrosis in the future  - Right retrograde pyelogram significant for hydronephrosis  - left retrograde pyelogram showed small intrarenal pelvis, delicate ureter with normal course and caliber  Initial Assessment:   55-year-old female presenting for lower abdominal pain with nausea and vomiting.  She is hypertensive otherwise normal vitals.  Abdomen is soft with diffuse mild tenderness to palpation worst in the lower mid abdomen.  Differential Diagnosis:   UTI  Radiation cystitis  Pain from ureteral stents  Dehydration  Gastritis  Given the patient's history of cervical cancer as well as questionable bladder tumor noted on recent ureters copy, concern for intra-abdominal malignancy    Clinical Tests:   Lab Tests: Ordered and Reviewed  Radiological Study: Ordered and Reviewed  ED Management:  Will check labs, give Toradol, Zofran, fluids and reassess.    No improvement after therapy.  Will give Reglan and Norco and do CT abdomen pelvis.    Lab workup is unremarkable.  UA is pending.  CT shows soft tissue thickening at the base of the bladder.  I reviewed the pathology results from the patient's recent cystoscopy which showed signs of radiation cystitis, I suspect that this is the bladder irritation seen on CT scan and likely contributing to the patient's pain.  I explained this to her.  I advised her to follow up with her gynecologic oncologist to discuss possible treatment options.  UA with RBCs, several WBCs but unconvincing for UTI.  Will defer antibiotic treatment pending urine culture. Stressed the importance of follow-up, strict ED return precautions given.  Patient voiced understanding and is comfortable with discharge. I discussed this patient with my  supervising physician.                Attending Attestation:     Physician Attestation Statement for NP/PA:   I discussed this assessment and plan of this patient with the NP/PA, but I did not personally examine the patient. The face to face encounter was performed by the NP/PA.                  ED Course as of Apr 19 0401   Sun Apr 19, 2020   0358 Mild anemia at baseline.  No leukocytosis   CBC W/ AUTO DIFFERENTIAL(!) [CC]      ED Course User Index  [CC] Rajani Moise PA-C                Clinical Impression:       ICD-10-CM ICD-9-CM   1. Lower abdominal pain R10.30 789.09   2. Cystitis, radiation N30.40 595.82         Disposition:   Disposition: Discharged  Condition: Stable                        Rajani Moise PA-C  04/19/20 0601       Gissel Mason MD  04/19/20 0601

## 2020-04-19 NOTE — DISCHARGE INSTRUCTIONS
Your CT scan shows some irritation around sure bladder which may be due to radiation cystitis.  This means irritation of the bladder that was caused by your radiation treatments that you had for your cancer.  I am prescribing medicine that you can take as needed for nausea.  You can take Tylenol at home, up to 3 g daily which is 6 of the 500 mg strength as needed for pain.    Please schedule an appointment with your gynecologic oncologist Dr. Lemon or your primary care doctor as soon as you are able to for follow-up.  If you have severe pain, persistent vomiting or new symptoms such as fever, vomiting blood bloody stool please return to the emergency department.

## 2020-05-04 ENCOUNTER — HOSPITAL ENCOUNTER (EMERGENCY)
Facility: HOSPITAL | Age: 55
Discharge: HOME | End: 2020-05-04
Attending: EMERGENCY MEDICINE
Payer: MEDICAID

## 2020-05-04 VITALS
TEMPERATURE: 98 F | BODY MASS INDEX: 34.36 KG/M2 | OXYGEN SATURATION: 99 % | DIASTOLIC BLOOD PRESSURE: 76 MMHG | SYSTOLIC BLOOD PRESSURE: 141 MMHG | HEART RATE: 79 BPM | RESPIRATION RATE: 15 BRPM | HEIGHT: 69 IN | WEIGHT: 232 LBS

## 2020-05-04 DIAGNOSIS — R31.9 URINARY TRACT INFECTION WITH HEMATURIA, SITE UNSPECIFIED: ICD-10-CM

## 2020-05-04 DIAGNOSIS — N39.0 URINARY TRACT INFECTION WITH HEMATURIA, SITE UNSPECIFIED: ICD-10-CM

## 2020-05-04 DIAGNOSIS — R60.9 DEPENDENT EDEMA: Primary | ICD-10-CM

## 2020-05-04 DIAGNOSIS — R60.0 LOWER EXTREMITY EDEMA: ICD-10-CM

## 2020-05-04 LAB
ALBUMIN SERPL BCP-MCNC: 3.1 G/DL (ref 3.5–5.2)
ALP SERPL-CCNC: 117 U/L (ref 55–135)
ALT SERPL W/O P-5'-P-CCNC: 16 U/L (ref 10–44)
ANION GAP SERPL CALC-SCNC: 11 MMOL/L (ref 8–16)
AST SERPL-CCNC: 14 U/L (ref 10–40)
BACTERIA #/AREA URNS AUTO: ABNORMAL /HPF
BASOPHILS # BLD AUTO: 0.05 K/UL (ref 0–0.2)
BASOPHILS NFR BLD: 0.6 % (ref 0–1.9)
BILIRUB SERPL-MCNC: 0.2 MG/DL (ref 0.1–1)
BILIRUB UR QL STRIP: NEGATIVE
BNP SERPL-MCNC: <10 PG/ML (ref 0–99)
BUN SERPL-MCNC: 18 MG/DL (ref 6–20)
CALCIUM SERPL-MCNC: 9.3 MG/DL (ref 8.7–10.5)
CHLORIDE SERPL-SCNC: 102 MMOL/L (ref 95–110)
CLARITY UR REFRACT.AUTO: ABNORMAL
CO2 SERPL-SCNC: 27 MMOL/L (ref 23–29)
COLOR UR AUTO: ABNORMAL
CREAT SERPL-MCNC: 1.3 MG/DL (ref 0.5–1.4)
DIFFERENTIAL METHOD: ABNORMAL
EOSINOPHIL # BLD AUTO: 0.4 K/UL (ref 0–0.5)
EOSINOPHIL NFR BLD: 4.1 % (ref 0–8)
ERYTHROCYTE [DISTWIDTH] IN BLOOD BY AUTOMATED COUNT: 14.2 % (ref 11.5–14.5)
EST. GFR  (AFRICAN AMERICAN): 52.6 ML/MIN/1.73 M^2
EST. GFR  (NON AFRICAN AMERICAN): 45.6 ML/MIN/1.73 M^2
GLUCOSE SERPL-MCNC: 109 MG/DL (ref 70–110)
GLUCOSE UR QL STRIP: NEGATIVE
HCT VFR BLD AUTO: 35.3 % (ref 37–48.5)
HGB BLD-MCNC: 10.4 G/DL (ref 12–16)
HGB UR QL STRIP: ABNORMAL
HYALINE CASTS UR QL AUTO: 0 /LPF
IMM GRANULOCYTES # BLD AUTO: 0.03 K/UL (ref 0–0.04)
IMM GRANULOCYTES NFR BLD AUTO: 0.3 % (ref 0–0.5)
KETONES UR QL STRIP: NEGATIVE
LEUKOCYTE ESTERASE UR QL STRIP: ABNORMAL
LYMPHOCYTES # BLD AUTO: 1.8 K/UL (ref 1–4.8)
LYMPHOCYTES NFR BLD: 20.7 % (ref 18–48)
MCH RBC QN AUTO: 28.2 PG (ref 27–31)
MCHC RBC AUTO-ENTMCNC: 29.5 G/DL (ref 32–36)
MCV RBC AUTO: 96 FL (ref 82–98)
MICROSCOPIC COMMENT: ABNORMAL
MONOCYTES # BLD AUTO: 0.9 K/UL (ref 0.3–1)
MONOCYTES NFR BLD: 10.7 % (ref 4–15)
NEUTROPHILS # BLD AUTO: 5.5 K/UL (ref 1.8–7.7)
NEUTROPHILS NFR BLD: 63.6 % (ref 38–73)
NITRITE UR QL STRIP: NEGATIVE
NRBC BLD-RTO: 0 /100 WBC
PH UR STRIP: 6 [PH] (ref 5–8)
PLATELET # BLD AUTO: 324 K/UL (ref 150–350)
PMV BLD AUTO: 10.4 FL (ref 9.2–12.9)
POTASSIUM SERPL-SCNC: 4.3 MMOL/L (ref 3.5–5.1)
PROT SERPL-MCNC: 7.6 G/DL (ref 6–8.4)
PROT UR QL STRIP: ABNORMAL
RBC # BLD AUTO: 3.69 M/UL (ref 4–5.4)
RBC #/AREA URNS AUTO: >100 /HPF (ref 0–4)
SODIUM SERPL-SCNC: 140 MMOL/L (ref 136–145)
SP GR UR STRIP: 1.01 (ref 1–1.03)
TROPONIN I SERPL DL<=0.01 NG/ML-MCNC: 0.01 NG/ML (ref 0–0.03)
URN SPEC COLLECT METH UR: ABNORMAL
WBC # BLD AUTO: 8.58 K/UL (ref 3.9–12.7)
WBC #/AREA URNS AUTO: 25 /HPF (ref 0–5)

## 2020-05-04 PROCEDURE — 87106 FUNGI IDENTIFICATION YEAST: CPT

## 2020-05-04 PROCEDURE — 87086 URINE CULTURE/COLONY COUNT: CPT

## 2020-05-04 PROCEDURE — 93010 ELECTROCARDIOGRAM REPORT: CPT | Mod: ,,, | Performed by: INTERNAL MEDICINE

## 2020-05-04 PROCEDURE — 36000 PLACE NEEDLE IN VEIN: CPT

## 2020-05-04 PROCEDURE — 93010 EKG 12-LEAD: ICD-10-PCS | Mod: ,,, | Performed by: INTERNAL MEDICINE

## 2020-05-04 PROCEDURE — 85025 COMPLETE CBC W/AUTO DIFF WBC: CPT

## 2020-05-04 PROCEDURE — 99284 EMERGENCY DEPT VISIT MOD MDM: CPT | Mod: ,,, | Performed by: EMERGENCY MEDICINE

## 2020-05-04 PROCEDURE — 93005 ELECTROCARDIOGRAM TRACING: CPT

## 2020-05-04 PROCEDURE — 94761 N-INVAS EAR/PLS OXIMETRY MLT: CPT

## 2020-05-04 PROCEDURE — 87088 URINE BACTERIA CULTURE: CPT

## 2020-05-04 PROCEDURE — 99284 PR EMERGENCY DEPT VISIT,LEVEL IV: ICD-10-PCS | Mod: ,,, | Performed by: EMERGENCY MEDICINE

## 2020-05-04 PROCEDURE — 81001 URINALYSIS AUTO W/SCOPE: CPT

## 2020-05-04 PROCEDURE — 83880 ASSAY OF NATRIURETIC PEPTIDE: CPT

## 2020-05-04 PROCEDURE — 80053 COMPREHEN METABOLIC PANEL: CPT

## 2020-05-04 PROCEDURE — 99284 EMERGENCY DEPT VISIT MOD MDM: CPT | Mod: 25

## 2020-05-04 PROCEDURE — 84484 ASSAY OF TROPONIN QUANT: CPT

## 2020-05-04 RX ORDER — CEPHALEXIN 500 MG/1
500 CAPSULE ORAL
COMMUNITY
Start: 2020-04-30 | End: 2020-08-06

## 2020-05-04 RX ORDER — CIPROFLOXACIN 500 MG/1
500 TABLET ORAL 2 TIMES DAILY
Qty: 14 TABLET | Refills: 0 | Status: SHIPPED | OUTPATIENT
Start: 2020-05-04 | End: 2020-05-11

## 2020-05-04 RX ORDER — AMLODIPINE BESYLATE 5 MG/1
5 TABLET ORAL
Status: ON HOLD | COMMUNITY
Start: 2020-04-30 | End: 2020-08-15 | Stop reason: HOSPADM

## 2020-05-04 NOTE — ED PROVIDER NOTES
Encounter Date: 5/4/2020       History     Chief Complaint   Patient presents with    Post-op Problem     had surg at [place] april 3 on my kidney, 'i have bags told to follow up with urology     HPI   Mrs. Riley is a very pleasant 54 yo F with a significant past medical history of hypertension, fibromyalgia, prior CVA with residual deficits, history of squamous cell carcinoma of the cervix diagnosed in 2015 with Mets to the right common iliac lymph nodes, status post 4 cycles of cisplatin and chemoradiation, history of radiation cystitis, with bilateral ureteral stents were placed on admission 03/22/2020, with bilateral nephrostomy tubes placed 04/20/2020 at [facility], presenting to the emergency department for lower extremity swelling.    Patient states that this morning she woke up and she had bilateral lower extremity swelling up to the level of the calf, she has not experienced this in the past, has no history of congestive heart failure.  Was causing some discomfort secondary to the swelling, bilateral foot pain.  Patient states that she was in her recliner or significant time yesterday.  Denies fever, chills, nausea, vomiting, diarrhea, abdominal pain, chest pain, back pain, purulent discharge, decreasing urinary output, or other infectious symptoms.  The patient denies paroxysmal nocturnal dyspnea, orthopnea, worsening shortness of breath, denies hand swelling.    Review of patient's allergies indicates:   Allergen Reactions    Bee sting [allergen ext-venom-honey bee]      Rash      Grass pollen-bermuda, standard      rash     Past Medical History:   Diagnosis Date    Anxiety     Cervical cancer 2014    Chronic back pain     Depression     Diarrhea due to malabsorption 11/14/2018    Fibromyalgia     GERD (gastroesophageal reflux disease)     Hiatal hernia 2014    History of cervical cancer 10/11/2018    Hx of psychiatric care     Cymbalta, trazodone    Hypertension      Hypomagnesemia 11/21/2018    Lactose intolerance     Metastatic squamous cell carcinoma to lymph node 10/11/2018    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 drug 11/14/2018    Osteoarthritis of back     Psychiatric problem     Stroke 1972     Past Surgical History:   Procedure Laterality Date    BILATERAL OOPHORECTOMY  2015    CHOLECYSTECTOMY  11/09/2016    Done at [facility], path showed chronic cholecystitis and gallstones    cold knife conization  2014    COLONOSCOPY  2014    COLONOSCOPY N/A 10/24/2016    at [facility], Dr Gutiérrez    COLONOSCOPY N/A 5/18/2018    Procedure: COLONOSCOPY;  Surgeon: Arden Gutiérrez MD;  Location: [facility] (4TH FLR);  Service: Endoscopy;  Laterality: N/A;    CYSTOSCOPY WITH URETEROSCOPY, RETROGRADE PYELOGRAPHY, AND INSERTION OF STENT Bilateral 3/21/2020    Procedure: CYSTOSCOPY, WITH RETROGRADE PYELOGRAM,;  Surgeon: Leslie Balbuena MD;  Location: [facility] OR 1ST FLR;  Service: Urology;  Laterality: Bilateral;    ESOPHAGOGASTRODUODENOSCOPY  2014    HYSTERECTOMY  2014    TVH for cervical cancer    OOPHORECTOMY      RETROPERITONEAL LYMPHADENECTOMY Right 9/17/2018    Procedure: LYMPHADENECTOMY, RETROPERITONEUM;  Surgeon: Sebas Reed MD;  Location: [facility] OR 2ND FLR;  Service: General;  Laterality: Right;  ILIAC     Family History   Problem Relation Age of Onset    Heart disease Sister     Heart disease Maternal Grandmother     Colon cancer Father     Esophageal cancer Father     Cancer Father         Lung-smoker    Cancer Mother         Cervical    Cervical cancer Mother     Breast cancer Maternal Aunt     Suicide Daughter         jumped from parking structure    Drug abuse Daughter     Drug abuse Daughter     Rectal cancer Neg Hx     Stomach cancer Neg Hx     Crohn's disease Neg Hx     Ulcerative colitis Neg Hx     Diabetes Neg Hx     Hypertension Neg Hx      Social History     Tobacco Use    Smoking status: Former Smoker    Smokeless tobacco: Never Used    Substance Use Topics    Alcohol use: No     Alcohol/week: 0.0 standard drinks    Drug use: No     Review of Systems   Constitutional: Negative for appetite change, chills, fatigue and fever.   HENT: Negative for congestion and sore throat.    Eyes: Negative for photophobia and visual disturbance.   Respiratory: Negative for cough, shortness of breath and wheezing.    Cardiovascular: Positive for leg swelling. Negative for chest pain and palpitations.   Gastrointestinal: Negative for abdominal pain, diarrhea, nausea and vomiting.   Genitourinary: Negative for dysuria and flank pain.   Musculoskeletal: Negative for back pain and neck pain.   Skin: Negative for pallor, rash and wound.   Neurological: Negative for dizziness, syncope, weakness, light-headedness and numbness.   Psychiatric/Behavioral: Negative for agitation and confusion.       Physical Exam     Initial Vitals [05/04/20 0902]   BP Pulse Resp Temp SpO2   134/75 86 18 98 °F (36.7 °C) 97 %      MAP       --         Physical Exam    Nursing note and vitals reviewed.  Constitutional: She appears well-developed and well-nourished. She is not diaphoretic. She is cooperative.  Non-toxic appearance. She does not have a sickly appearance. She does not appear ill. No distress.   HENT:   Head: Normocephalic and atraumatic.   Mouth/Throat: Oropharynx is clear and moist. No oropharyngeal exudate.   Eyes: Conjunctivae and EOM are normal. Pupils are equal, round, and reactive to light.   Neck: Normal range of motion.   Cardiovascular: Normal rate, regular rhythm, normal heart sounds and intact distal pulses. Exam reveals no gallop and no friction rub.    No murmur heard.  Pulmonary/Chest: Breath sounds normal. No stridor. No respiratory distress. She has no wheezes. She has no rhonchi. She has no rales.   Abdominal: Soft. She exhibits no distension. There is no tenderness. There is no guarding, no tenderness at McBurney's point and negative Kendrick's sign.    Musculoskeletal: Normal range of motion. She exhibits edema. She exhibits no tenderness.        Back:    Neurological: She is alert and oriented to person, place, and time. GCS score is 15. GCS eye subscore is 4. GCS verbal subscore is 5. GCS motor subscore is 6.   Skin: Skin is warm and dry. Capillary refill takes less than 2 seconds. No rash noted. No erythema.       ED Course   Procedures  Labs Reviewed   CBC W/ AUTO DIFFERENTIAL   COMPREHENSIVE METABOLIC PANEL   B-TYPE NATRIURETIC PEPTIDE   URINALYSIS, REFLEX TO URINE CULTURE   TROPONIN I     HO4  55-year-old female, past medical history notable for malignancy as above, status post chemoradiation, status post bilateral nephrostomy tubes, presenting to the emergency department for evaluation of bilateral lower extremity swelling, sudden onset, this morning, has not experienced anything like this in the past.  Patient denies any chest pain, shortness breath, or orthopnea.  Bedside ultrasound shows good cardiac function, EF appears to be approximately 50%, no pericardial effusion noted, no B lines noted on lung exam, no ascites noted on abdominal exam.  Concern for possible nephrotic syndrome, kidney dysfunction, electrolyte abnormalities, hypoalbuminemia, will order BNP and troponin to rule out congestive heart failure, also represent possible dependent edema secondary to sitting in a recliner for prolonged period of time yesterday.  Patient has no calf pain, no palpable cords on exam.  Labs and imaging ordered and currently pending.   Cain Felipe MD PGY4  05/04/2020 10:15 AM    HO4  Patient has 2+ protein in her urine today, 3+ occult blood 2+ leuk esterase, 6 consistent with patient's prior UAs, currently has no infectious symptoms, H&H is stable, no elevation or white blood cell count, currently 8.58, no concerning electrolyte abnormalities or elevated anion gap, albumin is mildly decreased at 3.1 which appears better than patient's baseline.  BNP  and troponin are within normal limits.  Anticipate discharge home, likely dependent edema secondary to patient sitting in her recliner for prolonged period of time yesterday.  Cain Felipe MD PGY4  05/04/2020 11:00 AM    HO4  Discussed with patient, she has taken her full 14 day course of Keflex, most recent culture data shows that is susceptible to ciprofloxacin, I have opted to treat with Cipro 500 mg b.i.d. for 7 days, patient feels comfortable with this plan, return to the emergency department if new or concerning symptoms develop, or if she has worsening swelling.  Patient agrees to elevate her legs, to not leave them in a gravity dependent position for too long, will continue to monitor her own status.  Cain Felipe MD PGY4  05/04/2020 11:15 AM         Imaging Results    None                                          Clinical Impression:       ICD-10-CM ICD-9-CM   1. Dependent edema R60.9 782.3   2. Lower extremity edema R60.0 782.3   3. Urinary tract infection with hematuria, site unspecified N39.0 599.0    R31.9 599.70                                Cain Felipe MD  Resident  05/04/20 1116

## 2020-05-04 NOTE — ED TRIAGE NOTES
Pt had kidney stent placement on April 30th (bilateral nephrostomy tubes). Noticed leg swelling starting this morning when she woke up.

## 2020-05-04 NOTE — ED NOTES
LOC: The patient is awake, alert and aware of environment with an appropriate affect, the patient is oriented x 3 and speaking appropriately.  APPEARANCE: Patient resting comfortably and in no acute distress, patient is clean and well groomed, patient's clothing is properly fastened.  SKIN: The skin is warm and dry, color consistent with ethnicity, patient has normal skin turgor and moist mucus membranes, skin intact, no breakdown or bruising noted.  RESPIRATORY: Airway is open and patent, respirations are spontaneous, patient has a normal effort and rate, no accessory muscle use noted  ABDOMEN: Soft and non tender to palpation, no distention noted  NEUROLOGIC:  facial expression is symmetrical, patient moving all extremities spontaneously, normal sensation in all extremities when touched with a finger.  Follows all commands appropriately.    Patient states she had kidney stents placed, patient states she has been going very well since the surgery but states she woke up this am and has moderate swelling noted to bilateral lower extremities. Patient denies this ever happening before, cardiac monitoring placed on patient, vss, will continue to monitor

## 2020-05-07 ENCOUNTER — TELEPHONE (OUTPATIENT)
Dept: PHYSICAL MEDICINE AND REHAB | Facility: CLINIC | Age: 55
End: 2020-05-07

## 2020-05-07 ENCOUNTER — TELEPHONE (OUTPATIENT)
Dept: INTERNAL MEDICINE | Facility: CLINIC | Age: 55
End: 2020-05-07

## 2020-05-07 LAB — BACTERIA UR CULT: ABNORMAL

## 2020-05-07 NOTE — TELEPHONE ENCOUNTER
Patient is scheduled to see Dr Araiza.  Reminder letter will be mailed to her address in the chart.      ----- Message from Bandar Cummins MA sent at 5/7/2020  8:25 AM CDT -----  Contact: self @ [phone]  Good morning,    Per Dr. James Araiza has agreed to see this pt. Would you be able to schedule her for your next available? Thanks     ----- Message -----  From: Carlos Ramirez DO  Sent: 5/7/2020   8:17 AM CDT  To: Bandar Cummins MA    I attempted to call her this AM.  She can see either Dr. Araiza or Dr. Pickard- either would be a great fit.  Both have said its ok to have my patients follow up with them if needed.  Please pass this info along to her.  Thanks  ----- Message -----  From: Bandar Cummins MA  Sent: 5/6/2020   3:28 PM CDT  To: Carlos Ramirez, DO    Spoke with pt she is requesting to speak with you regarding her treatment going forward. Which provider do you feel would be best for her? May you send Dr. Araiza a message to see if he would accept her?    ----- Message -----  From: Shruthi Singer  Sent: 5/6/2020   2:54 PM CDT  To: [facility] Staff    Calling to schedule a f/u appt with Dr Ramirez after 5-15-20 but there eis nothing available.  Pls call.

## 2020-05-12 ENCOUNTER — TELEPHONE (OUTPATIENT)
Dept: ADMINISTRATIVE | Facility: OTHER | Age: 55
End: 2020-05-12

## 2020-05-12 NOTE — PROGRESS NOTES
Subjective:       Patient ID: Edita Riley is a 55 y.o. female.    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 S/P chemoRT.   Denies vaginal bleeding.   No complaints.     2020: Recent ED visit for flank pain. Found to have right hydronephrosis. 3/21/2020: Cystoscopy with Dr. Balbuena. Bilateral ureteral stents placed. Throughout the bladder base there were areas of heaped up tissue concerning for bladder tumor versus radiation cystitis.  The areas lateral to each UO were resected and tissue was sent for pathology. Pathology showed:Urothelial mucosa with marked acute and chronic inflammation including numerous eosinophils, vascular  constriction and necrosis of stromal tissue, and urothelial hyperplasia with cytologic atypia suggestive of  radiation cystitis.  No definitive malignancy is identified.    2020:  CT AP: Bilateral double-J ureteral catheters with mild bilateral hydronephrosis.  Nonspecific soft tissue thickening at the base of the bladder right greater left.  Had bilateral nephrostomy tubes placed at [facility]. [place].     May 2020:  ED visit for bilateral lower extremity swelling up to the level of the calf.  Bilateral nephrostomy tubes.  UTI.  Urine cultures showed IJEOMA LUSITANIAE           Pap:  Aug 23, 2019: Normal  MM2019: normal   CBE: Aug 2019     Her oncologic history is:  Referring physician:  Dr. Sebas Reed  Reason for referral:  Incisional biopsy of retroperitoneal periaortic node that shows squamous cell carcinoma consistent with gynecologic primary        Aug 2018: chronic abdominal pain referred for consideration of biopsy of a 2 cm right common iliac artery bifurcation node  Node was evident on scan in  and again in  - no change  Patient's symptoms are non specific, diffuse, have not resulted in weight loss  Patient already carries a diagnosis  "of diffuse pain syndromes including fibromyalgia        Aug 2018: PET scan Right common iliac lymph node suspicious for malignancy.  This could be benign or malignant lymphoproliferative disorder metastatic disease.  This is an a risky position for percutaneous biopsy.      Sept 17, 2018: Underwent retroperitoneal approach for incisional biopsy of right common iliac LN. Pathology showed:  Supplemental Diagnosis  METASTATIC HIGH-GRADE CARCINOMA GROWING WITH SQUAMOUS CELL FEATURES. THE RESULTS OF  ADDITIONAL IMMUNOSTAINS ARE AS FOLLOWS: CK5/6, P63 AND CK7 ARE POSITIVE. TTF, S100 AND  CK20 ARE ALL NEGATIVE. THE CONTROLS STAIN APPROPRIATELY.  NOTE: GIVEN THESE RESULTS THIS MAY REPRESENT A PRIMARY IN THE URINARY OR LOWER GYN  TRACTS. DR. JAMESON HAS ALSO REVIEWED THIS CASE AND CONCURS WITH THE DIAGNOSIS.           She has a history of hysterectomy for cervical cancer in 2015 in [place], [place]. Hyst was for abnormal pap. Incidental finding of cervical cancer on the hyst specimen. She does not know any other details.      Nov 2018: started concurrent chemoradiation.      Dec 2018: admitted with hypokalemia, hypomagnesemia and hypocalcemia. Seizure like activity. Worsening depression. Electrolyte replacement. Started on Abilify by psychiatry. S/p 4 cycles of cisplatin.      Jan 2019: completed "WPRT and 4 cycles of cisplatin. Received only 4 due to electrolyte abnormalities.      Feb 2019:PET: Right common iliac lymph node, shows max SUV of 5.6 on today's exam versus 10.2 on prior exam.  A subcentimeter lymph node is seen in CT in this region.    The previously seen avid left axillary lymph node is no longer visualized is FDG avid.             May 2019: CT CAP: no evidence for disease. (CT done for insurance reason).  Previous imaging was a PET scan    Nov 2019: CT AP: STEVEN            Review of Systems   Constitutional: Negative for chills, fatigue and fever.   Respiratory: Negative for cough, shortness of breath and wheezing.  " "  Cardiovascular: Negative for chest pain, palpitations and leg swelling.   Gastrointestinal: Negative for abdominal pain, constipation, diarrhea, nausea and vomiting.   Genitourinary: Negative for difficulty urinating, dysuria, frequency, genital sores, hematuria, urgency, vaginal bleeding, vaginal discharge and vaginal pain.   Neurological: Negative for weakness.   Hematological: Negative for adenopathy. Does not bruise/bleed easily.   Psychiatric/Behavioral: The patient is not nervous/anxious.        Objective:   /64   Pulse 95   Ht 5' 9" (1.753 m)   Wt 97.9 kg (215 lb 13.3 oz)   LMP 06/08/2014 (Approximate)   BMI 31.87 kg/m²      Physical Exam   Constitutional: She is oriented to person, place, and time. She appears well-developed and well-nourished.   HENT:   Head: Normocephalic and atraumatic.   Eyes: No scleral icterus.   Neck: Neck supple. No tracheal deviation present. No thyroid mass and no thyromegaly present.   Cardiovascular: Normal rate and regular rhythm.   Pulmonary/Chest: Effort normal and breath sounds normal. She has no wheezes.   Abdominal: Soft. She exhibits no distension and no mass. There is no hepatosplenomegaly. There is no tenderness. There is no rebound and no guarding.   Genitourinary:   Genitourinary Comments: Bimanual exam:  Vulva: no lesions. Normal appearance  Urethra: Normal size and location. No lesions  Bladder: No masses or tenderness.  Vagina: normal mucosa. No lesion  Cervix: absent.   Uterus: absent.  Adnexa: no masses.  Rectovaginal: Not performed     Musculoskeletal: She exhibits no edema or tenderness.   Lymphadenopathy:     She has no cervical adenopathy.     She has no axillary adenopathy.        Right: No inguinal and no supraclavicular adenopathy present.        Left: No inguinal and no supraclavicular adenopathy present.   Neurological: She is alert and oriented to person, place, and time.   Skin: Skin is warm and dry.   Psychiatric: She has a normal mood and " affect. Her behavior is normal. Judgment and thought content normal.       Assessment:       1. Screening mammogram, encounter for    2. History of cervical cancer        Plan:   Screening mammogram, encounter for  -     Mammo Digital Screening Bilat; Future; Expected date: 08/10/2020    History of cervical cancer       STEVEN    RTC in 3 months.   WWE in Aug 2020.   Message sent to Dr. Balbuena re: UTI with yeast. Patient has an upcoming appt with her re: nephrostomy tubes.

## 2020-05-13 ENCOUNTER — TELEPHONE (OUTPATIENT)
Dept: UROLOGY | Facility: CLINIC | Age: 55
End: 2020-05-13

## 2020-05-13 ENCOUNTER — OFFICE VISIT (OUTPATIENT)
Dept: GYNECOLOGIC ONCOLOGY | Facility: CLINIC | Age: 55
End: 2020-05-13
Payer: MEDICAID

## 2020-05-13 VITALS
DIASTOLIC BLOOD PRESSURE: 64 MMHG | SYSTOLIC BLOOD PRESSURE: 118 MMHG | HEART RATE: 95 BPM | HEIGHT: 69 IN | WEIGHT: 215.81 LBS | BODY MASS INDEX: 31.96 KG/M2

## 2020-05-13 DIAGNOSIS — Z85.41 HISTORY OF CERVICAL CANCER: Chronic | ICD-10-CM

## 2020-05-13 DIAGNOSIS — Z12.31 SCREENING MAMMOGRAM, ENCOUNTER FOR: Primary | ICD-10-CM

## 2020-05-13 PROCEDURE — 99213 OFFICE O/P EST LOW 20 MIN: CPT | Mod: PBBFAC | Performed by: OBSTETRICS & GYNECOLOGY

## 2020-05-13 PROCEDURE — 99214 OFFICE O/P EST MOD 30 MIN: CPT | Mod: S$PBB,,, | Performed by: OBSTETRICS & GYNECOLOGY

## 2020-05-13 PROCEDURE — 99999 PR PBB SHADOW E&M-EST. PATIENT-LVL III: ICD-10-PCS | Mod: PBBFAC,,, | Performed by: OBSTETRICS & GYNECOLOGY

## 2020-05-13 PROCEDURE — 99999 PR PBB SHADOW E&M-EST. PATIENT-LVL III: CPT | Mod: PBBFAC,,, | Performed by: OBSTETRICS & GYNECOLOGY

## 2020-05-13 PROCEDURE — 99214 PR OFFICE/OUTPT VISIT, EST, LEVL IV, 30-39 MIN: ICD-10-PCS | Mod: S$PBB,,, | Performed by: OBSTETRICS & GYNECOLOGY

## 2020-05-13 NOTE — TELEPHONE ENCOUNTER
Called the patient because Dr. Lemon had sent me a note indicating that the patient had candiduria     Apparently, she went to [facility] with abdominal pain and acute renal failure.  She indicated that a CT scan was done demonstrating bilateral hydronephrosis in spite of the stents being in place and percutaneous nephrostomy tubes were placed according to the chart note in Care Everywhere they requested that the stents be removed at the time of percutaneous nephrostomy placement.  Her Creatinine went from 3.5 on 4/28 (care everywhere) to 1.3 on 5/4/2020 at our lab here.     Discussed that she had stent failure and the patient became argumentative stating that she was not told this by the urology doctors at [facility].  Explained the pathology but after 15 minutes of her expressing frustration and not wanting to live with the tubes, I asked that she come the appointment on the 22nd for further discussion.  Hopefully this will give her time to let the information sink in.     In the meantime, I have asked Dr. Lemon for his opinion regarding her cancer to consider if urinary diversion is feasible.

## 2020-05-14 ENCOUNTER — TELEPHONE (OUTPATIENT)
Dept: GYNECOLOGIC ONCOLOGY | Facility: CLINIC | Age: 55
End: 2020-05-14

## 2020-05-14 ENCOUNTER — TELEPHONE (OUTPATIENT)
Dept: ADMINISTRATIVE | Facility: OTHER | Age: 55
End: 2020-05-14

## 2020-05-14 DIAGNOSIS — C77.9 METASTATIC SQUAMOUS CELL CARCINOMA TO LYMPH NODE: Primary | Chronic | ICD-10-CM

## 2020-05-14 NOTE — TELEPHONE ENCOUNTER
Left message that I have ordered a PET scan to determine status of disease.   I also left a message that this may not be approved by her insurance company in which case I would then order a CT scan of the abdomen and pelvis.

## 2020-05-18 ENCOUNTER — OFFICE VISIT (OUTPATIENT)
Dept: INTERNAL MEDICINE | Facility: CLINIC | Age: 55
End: 2020-05-18
Payer: MEDICAID

## 2020-05-18 VITALS
SYSTOLIC BLOOD PRESSURE: 130 MMHG | HEIGHT: 69 IN | HEART RATE: 97 BPM | BODY MASS INDEX: 32.49 KG/M2 | WEIGHT: 219.38 LBS | DIASTOLIC BLOOD PRESSURE: 78 MMHG | OXYGEN SATURATION: 98 %

## 2020-05-18 DIAGNOSIS — I10 ESSENTIAL HYPERTENSION: Chronic | ICD-10-CM

## 2020-05-18 DIAGNOSIS — D63.1 ANEMIA DUE TO CHRONIC KIDNEY DISEASE, UNSPECIFIED CKD STAGE: ICD-10-CM

## 2020-05-18 DIAGNOSIS — G62.0 NEUROPATHY DUE TO CHEMOTHERAPEUTIC DRUG: Primary | ICD-10-CM

## 2020-05-18 DIAGNOSIS — T45.1X5A NEUROPATHY DUE TO CHEMOTHERAPEUTIC DRUG: Primary | ICD-10-CM

## 2020-05-18 DIAGNOSIS — Z23 NEED FOR SHINGLES VACCINE: ICD-10-CM

## 2020-05-18 DIAGNOSIS — N12 PYELONEPHRITIS OF RIGHT KIDNEY: ICD-10-CM

## 2020-05-18 DIAGNOSIS — N18.9 ANEMIA DUE TO CHRONIC KIDNEY DISEASE, UNSPECIFIED CKD STAGE: ICD-10-CM

## 2020-05-18 DIAGNOSIS — M79.606 LOWER EXTREMITY PAIN, DIFFUSE, UNSPECIFIED LATERALITY: ICD-10-CM

## 2020-05-18 DIAGNOSIS — N13.30 HYDRONEPHROSIS OF RIGHT KIDNEY: ICD-10-CM

## 2020-05-18 DIAGNOSIS — Z85.41 HISTORY OF CERVICAL CANCER: Chronic | ICD-10-CM

## 2020-05-18 DIAGNOSIS — M79.7 FIBROMYALGIA: Chronic | ICD-10-CM

## 2020-05-18 DIAGNOSIS — Z96.0 S/P URETERAL STENT PLACEMENT: ICD-10-CM

## 2020-05-18 PROCEDURE — 99999 PR PBB SHADOW E&M-EST. PATIENT-LVL IV: CPT | Mod: PBBFAC,,, | Performed by: STUDENT IN AN ORGANIZED HEALTH CARE EDUCATION/TRAINING PROGRAM

## 2020-05-18 PROCEDURE — 99214 OFFICE O/P EST MOD 30 MIN: CPT | Mod: PBBFAC | Performed by: STUDENT IN AN ORGANIZED HEALTH CARE EDUCATION/TRAINING PROGRAM

## 2020-05-18 PROCEDURE — 99999 PR PBB SHADOW E&M-EST. PATIENT-LVL IV: ICD-10-PCS | Mod: PBBFAC,,, | Performed by: STUDENT IN AN ORGANIZED HEALTH CARE EDUCATION/TRAINING PROGRAM

## 2020-05-18 PROCEDURE — 99214 PR OFFICE/OUTPT VISIT, EST, LEVL IV, 30-39 MIN: ICD-10-PCS | Mod: S$PBB,,, | Performed by: STUDENT IN AN ORGANIZED HEALTH CARE EDUCATION/TRAINING PROGRAM

## 2020-05-18 PROCEDURE — 99214 OFFICE O/P EST MOD 30 MIN: CPT | Mod: S$PBB,,, | Performed by: STUDENT IN AN ORGANIZED HEALTH CARE EDUCATION/TRAINING PROGRAM

## 2020-05-18 NOTE — ASSESSMENT & PLAN NOTE
Patient on amlodipine 5 daily at home. BP well controlled today in clinic.     Plan  - continue home medication.

## 2020-05-18 NOTE — ASSESSMENT & PLAN NOTE
Patient has been on cephalexin daily for chronic pyelo.    Plan  - continue  - follow up with Urology later this week regarding how long to take this medication

## 2020-05-18 NOTE — ASSESSMENT & PLAN NOTE
Patient prescribed both norco and oxy by [facility] following bilat tube placement.    Plan  - discussed with patient need to decrease intake as these are addictive.  - patient agreeable to above

## 2020-05-18 NOTE — ASSESSMENT & PLAN NOTE
Patient has bilat ureteral stents and tubes.  - Patient has no complaints stating both are draining well.    Plan  - follow up with Urology this week for stent removal.

## 2020-05-18 NOTE — PATIENT INSTRUCTIONS
1. Get shingles vaccine  2. Follow up with Urology and Gyn Onc   3. Decrease taking norco and oxy as you can  4. Please increase iron by following below.  Iron-Deficiency Anemia (Adult)  Red blood cells carry oxygen to the tissues of your body. Anemia is a condition in which you have too few red blood cells. You need iron to make red cells. Anemia makes you feel tired and run down. When anemia becomes severe, your skin becomes pale. You may feel short of breath after physical activity. Other symptoms include:  · Headaches  · Dizziness  · Leg cramps with physical activity  · Drowsiness  · Restless legs  Your anemia is caused by not having enough iron in your body. This may be because of:  · Loss of blood. This can be caused by heavy menstrual periods. It can also be caused by bleeding from the stomach or intestines.  · Poor diet. You may not be eating enough foods that contain iron.  · Inability to absorb iron from the foods you eat  · Pregnancy  If your blood count is low enough, your healthcare provider may prescribe an iron supplement. It usually takes about 2 to 3 months of treatment with iron supplements to correct anemia. Severe cases of anemia need a blood transfusion to quickly ease symptoms and deliver more oxygen to the cells.  Home care  Follow these guidelines when caring for yourself at home:  · Eat foods high in iron. This will boost the amount of iron stored in your body. It is a natural way to build up the number of blood cells. Good sources of iron include beef, liver, spinach and other dark green leafy vegetables, whole grains, beans, and nuts.  · Do not overexert yourself.  · Talk with your healthcare provider before traveling by air or traveling to high altitudes.  Follow-up care  Follow up with your healthcare provider in 2 months, or as advised. This is to have another red blood cell count to be sure your anemia has been fixed.  When to seek medical advice  Call your healthcare provider right  away if any of these occur:  · Shortness of breath or chest pain  · Dizziness or fainting  · Vomiting blood or passing red or black-colored stool   Date Last Reviewed: 2/25/2016 © 2000-2017 [employer]. [address] [place] [address], [place] [address]. All rights reserved. This information is not intended as a substitute for professional medical care. Always follow your healthcare professional's instructions.

## 2020-05-18 NOTE — ASSESSMENT & PLAN NOTE
Patient's labs in April showed anemia of chronic disease as well as iron deficiency.    Plan  - most likely improved with stents and tubes, but encouraged iron intake.

## 2020-05-18 NOTE — ASSESSMENT & PLAN NOTE
Patient follows with Dr. Lemon in Gyn Onc and has appointment later this week.    Plan  - continue follow up with Dr. Lemon

## 2020-05-18 NOTE — PROGRESS NOTES
Clinic Note  5/18/2020      Subjective:       Patient ID:  Edita is a 55 y.o. female being seen to establish care.     Chief Complaint: Follow-up    Ms. Riley is a 54 yo feamle with HTN, fibromyalgia, prior CVA with residual deficits, hx of SCC of cervix in 2015 with mets to right common iliac lymph nodes, status post 4 cycles of cisplatin with chemo, hx of radiation cystitis with bilat stents 3/22/20, with bilat nephrostomy tubes 4/20/20 at [facility].    Patient presented to [facility] ED on 5/4/20 with bilat LE swelling up to calf. UA showed 2+ protein, 3+ occult blood, 2+ leuk esterase, 6 consistent with prior UAs (Cr 1.3 down from 3.5 prior to nephrostomy tubes). Was sent home determine dto be dependent edema secondary to sitting at home for prolonged periods of time.     Patient follows with Dr. Lemon in Gyn/Onc and Dr. Balbuena in Urology. She has PET scan as well as appointments scheduled later this week.    Patient is here today for a follow up. Patient states she is feeling fine. Patient states her urine output is great and she is going to the restroom on her own. Previously, her urine was not completely emptying and was more constipation. Patient denies burning with urination, hematuria, constipation, diarrhea, nausea/vomiting, fevers, chills.     Patient has no complaints or concerns today. Patient is on gabapentin, amlodipine, cephalexin, norco, and oxy. Patient was prescribed the norco and oxy since her surgery. Patient also reports she takes 2 muscle relaxers. Patient reports she takes the norco and oxy a few times a week.     Discussed that patient should not take the pain meds unless required as they are addictive. Patient understands and agrees.     Patient does not smoke, drink, and no recreational drug use. Patient lives with her  and daughter and they've been taking care of her. Patient does not work. Patient has an appointment with Urology on 5/22 for removal of stents.       Past Medical  History:   Diagnosis Date    Anxiety     Cervical cancer 2014    Chronic back pain     Depression     Diarrhea due to malabsorption 11/14/2018    Fibromyalgia     GERD (gastroesophageal reflux disease)     Hiatal hernia 2014    History of cervical cancer 10/11/2018    Hx of psychiatric care     Cymbalta, trazodone    Hypertension     Hypomagnesemia 11/21/2018    Lactose intolerance     Metastatic squamous cell carcinoma to lymph node 10/11/2018    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 drug 11/14/2018    Osteoarthritis of back     Psychiatric problem     Stroke 1972       Past Surgical History:   Procedure Laterality Date    BILATERAL OOPHORECTOMY  2015    CHOLECYSTECTOMY  11/09/2016    Done at [facility], path showed chronic cholecystitis and gallstones    cold knife conization  2014    COLONOSCOPY  2014    [facility]    COLONOSCOPY N/A 5/18/2018    Procedure: COLONOSCOPY;  Surgeon: Arden Gutiérrez MD;  Location: [facility] (4TH FLR);  Service: Endoscopy;  Laterality: N/A;    CYSTOSCOPY WITH URETEROSCOPY, RETROGRADE PYELOGRAPHY, AND INSERTION OF STENT Bilateral 3/21/2020    Procedure: CYSTOSCOPY, WITH RETROGRADE PYELOGRAM,;  Surgeon: Leslie Balbuena MD;  Location: [facility] OR 1ST FLR;  Service: Urology;  Laterality: Bilateral;    ESOPHAGOGASTRODUODENOSCOPY  2014    [facility]ST[facility]CTOMY  [facility]    [facility]    OOPHORECTOMY      RETROPERITONEAL LYMPHADENECTOMY Right 9/17/2018    Procedure: LYMPHADENECTOMY, RETROPERITONEUM;  Surgeon: Sebas Reed MD;  Location: [facility] OR 2ND FLR;  Service: General;  Laterality: Right;  ILIAC       Family History   Problem Relation Age of Onset    Heart disease Sister     Heart disease Maternal Grandmother     Colon cancer Father     Esophageal cancer Father     Cancer Father         Lung-smoker    Cancer Mother         Cervical    Cervical cancer Mother     Breast cancer Maternal Aunt     Suicide Daughter          jumped from parking structure    Drug abuse Daughter     Drug abuse Daughter     Rectal cancer Neg Hx     Stomach cancer Neg Hx     Crohn's disease Neg Hx     Ulcerative colitis Neg Hx     Diabetes Neg Hx     Hypertension Neg Hx        Social History     Socioeconomic History    Marital status:      Spouse name: Hammad    Number of children: 2    Years of education: Not on file    Highest education level: Not on file   Occupational History    Not on file   Social Needs    Financial resource strain: Not on file    Food insecurity:     Worry: Not on file     Inability: Not on file    Transportation needs:     Medical: Not on file     Non-medical: Not on file   Tobacco Use    Smoking status: Former Smoker    Smokeless tobacco: Never Used   Substance and Sexual Activity    Alcohol use: No     Alcohol/week: 0.0 standard drinks    Drug use: No    Sexual activity: Yes     Partners: Male     Birth control/protection: None     Comment:  19 years since    Lifestyle    Physical activity:     Days per week: Not on file     Minutes per session: Not on file    Stress: Not on file   Relationships    Social connections:     Talks on phone: Not on file     Gets together: Not on file     Attends Scientologist service: Not on file     Active member of club or organization: Not on file     Attends meetings of clubs or organizations: Not on file     Relationship status: Not on file   Other Topics Concern    Patient feels they ought to cut down on drinking/drug use Not Asked    Patient annoyed by others criticizing their drinking/drug use Not Asked    Patient has felt bad or guilty about drinking/drug use Not Asked    Patient has had a drink/used drugs as an eye opener in the AM Not Asked   Social History Narrative    , twin daughters (1  2018), disabled due to childhood stroke, Pentecostal sophia         Review of Systems   Constitutional: Negative for chills,  fever and malaise/fatigue.   HENT: Negative for congestion and hearing loss.    Eyes: Negative for blurred vision and double vision.   Respiratory: Negative for cough and shortness of breath.    Cardiovascular: Negative for chest pain and leg swelling.   Gastrointestinal: Negative for constipation, diarrhea and nausea.   Genitourinary: Negative for dysuria, flank pain, frequency and hematuria.   Musculoskeletal: Negative for back pain and neck pain.   Skin: Negative for itching and rash.   Neurological: Negative for weakness and headaches.   Psychiatric/Behavioral: Negative for depression and substance abuse.       Medication List with Changes/Refills   Current Medications    AMLODIPINE (NORVASC) 5 MG TABLET    Take 5 mg by mouth.    CEPHALEXIN (KEFLEX) 500 MG CAPSULE    Take 500 mg by mouth.    DOCUSATE SODIUM (COLACE) 100 MG CAPSULE    Take 1 capsule (100 mg total) by mouth 2 (two) times daily as needed for Constipation.    GABAPENTIN (NEURONTIN) 300 MG CAPSULE    Take 1 capsule (300 mg total) by mouth 3 (three) times daily.    LISINOPRIL 10 MG TABLET    Take 1 tablet (10 mg total) by mouth once daily.    OMEPRAZOLE (PRILOSEC) 40 MG CAPSULE    Take 1 capsule (40 mg total) by mouth once daily.    TRAZODONE (DESYREL) 50 MG TABLET    TAKE 1 TABLET (50 MG TOTAL) BY MOUTH EVERY EVENING.       Patient Active Problem List   Diagnosis    Osteoarthritis of back    Hiatal hernia    Essential hypertension    Lower extremity pain, diffuse    Weakness of both lower extremities    Impaired functional mobility, balance, gait, and endurance    Schatzki's ring    Colon polyp    Internal hemorrhoids    Cardiovascular event risk -- low    Hemifacial spasm    Severe episode of recurrent major depressive disorder, with psychotic features    Fibromyalgia    Facial palsy    Blood in stool    Sleep stage dysfunction    Carpal tunnel syndrome on right    Weakness    Difficulty walking    Gastroesophageal reflux disease  "with esophagitis    Abnormal CT of the abdomen    Lymphadenopathy    History of cervical cancer    Metastatic squamous cell carcinoma to lymph node    Generalized anxiety disorder    PTSD (post-traumatic stress disorder)    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 drug    Diarrhea due to malabsorption    Seizure-like activity    Stroke    Electrolyte disorder    ODESSA (acute kidney injury)    Hydronephrosis of right kidney    Pyelonephritis of right kidney    S/P ureteral stent placement    Anemia due to chronic kidney disease    Need for shingles vaccine               Objective:      /78 (BP Location: Right arm, Patient Position: Sitting, BP Method: Large (Manual))   Pulse 97   Ht 5' 9" (1.753 m)   Wt 99.5 kg (219 lb 5.7 oz)   LMP 06/08/2014 (Approximate)   SpO2 98%   BMI 32.39 kg/m²   Estimated body mass index is 32.39 kg/m² as calculated from the following:    Height as of this encounter: 5' 9" (1.753 m).    Weight as of this encounter: 99.5 kg (219 lb 5.7 oz).  Physical Exam   Constitutional: She is oriented to person, place, and time and well-developed, well-nourished, and in no distress.   Eyes: Pupils are equal, round, and reactive to light. Conjunctivae and EOM are normal.   Cardiovascular: Normal rate, regular rhythm and normal heart sounds.   No murmur heard.  Pulmonary/Chest: Effort normal and breath sounds normal. No respiratory distress. She has no wheezes.   Abdominal: Soft. Bowel sounds are normal. She exhibits no distension. There is no tenderness.   Genitourinary:   Genitourinary Comments: Patient has tubes from bilat flank draining to bag. Urine is clear/light yellow   Musculoskeletal: Normal range of motion. She exhibits edema (slight non pitting bilat LE). She exhibits no tenderness.   Neurological: She is alert and oriented to person, place, and time. No cranial nerve deficit.   Skin: Skin is warm and dry. No erythema.   Psychiatric: Memory, affect and judgment normal. "   Nursing note and vitals reviewed.        Assessment and Plan:         Problem List Items Addressed This Visit        Neuro    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 drug - Primary    Current Assessment & Plan     Patient takes gabapentin daily and states this helps tremendously.    Plan  - continue home medications            Cardiac/Vascular    Essential hypertension (Chronic)    Current Assessment & Plan     Patient on amlodipine 5 daily at home. BP well controlled today in clinic.     Plan  - continue home medication.            Renal/    Hydronephrosis of right kidney    Current Assessment & Plan     See stent assessment and plan            ID    Need for shingles vaccine    Current Assessment & Plan     Patient overdue for shingles vaccine.    Plan  - Patient to receive shingles vaccine today.            Oncology    History of cervical cancer (Chronic)    Current Assessment & Plan     Patient follows with Dr. Lemon in Gyn Onc and has appointment later this week.    Plan  - continue follow up with Dr. Lemon         Anemia due to chronic kidney disease    Current Assessment & Plan     Patient's labs in April showed anemia of chronic disease as well as iron deficiency.    Plan  - most likely improved with stents and tubes, but encouraged iron intake.            Orthopedic    Fibromyalgia (Chronic)    Current Assessment & Plan     Patient takes gabapentin daily    Plan  - continue home medication         Lower extremity pain, diffuse    Current Assessment & Plan     Patient prescribed both norco and oxy by [facility] following bilat tube placement.    Plan  - discussed with patient need to decrease intake as these are addictive.  - patient agreeable to above            Other    Pyelonephritis of right kidney    Current Assessment & Plan     Patient has been on cephalexin daily for chronic pyelo.    Plan  - continue  - follow up with Urology later this week regarding how long to take this medication         S/P ureteral  stent placement    Current Assessment & Plan     Patient has bilat ureteral stents and tubes.  - Patient has no complaints stating both are draining well.    Plan  - follow up with Urology this week for stent removal.                Follow Up:       Edita was seen today for follow-up.    Diagnoses and all orders for this visit:    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 drug    Fibromyalgia    Lower extremity pain, diffuse, unspecified laterality    Pyelonephritis of right kidney    Essential hypertension    History of cervical cancer    Hydronephrosis of right kidney    S/P ureteral stent placement    Anemia due to chronic kidney disease, unspecified CKD stage    Need for shingles vaccine            Other Orders Placed This Visit:  No orders of the defined types were placed in this encounter.            Interview, Assessment, Findings, and Plan discussed with Dr. Mariee.    Follow up in about 6 months (around 11/18/2020).    Cassie Baig MD  Internal Medicine   [phone]

## 2020-05-18 NOTE — ASSESSMENT & PLAN NOTE
Patient takes gabapentin daily and states this helps tremendously.    Plan  - continue home medications

## 2020-05-19 ENCOUNTER — TELEPHONE (OUTPATIENT)
Dept: ADMINISTRATIVE | Facility: OTHER | Age: 55
End: 2020-05-19

## 2020-05-19 DIAGNOSIS — Z85.41 HISTORY OF CERVICAL CANCER: Chronic | ICD-10-CM

## 2020-05-19 DIAGNOSIS — C77.9 METASTATIC SQUAMOUS CELL CARCINOMA TO LYMPH NODE: Primary | Chronic | ICD-10-CM

## 2020-05-19 NOTE — TELEPHONE ENCOUNTER
----- Message from Refugio Lemon MD sent at 5/19/2020  1:07 PM CDT -----  Please call and tell her that PET was denied. Needs BMP and CT scan AP. Order placed.

## 2020-05-21 ENCOUNTER — HOSPITAL ENCOUNTER (OUTPATIENT)
Dept: RADIOLOGY | Facility: HOSPITAL | Age: 55
Discharge: HOME | End: 2020-05-21
Attending: OBSTETRICS & GYNECOLOGY
Payer: MEDICAID

## 2020-05-21 DIAGNOSIS — Z85.41 HISTORY OF CERVICAL CANCER: ICD-10-CM

## 2020-05-21 DIAGNOSIS — C77.9 METASTATIC SQUAMOUS CELL CARCINOMA TO LYMPH NODE: ICD-10-CM

## 2020-05-21 PROCEDURE — 74177 CT ABD & PELVIS W/CONTRAST: CPT | Mod: TC

## 2020-05-21 PROCEDURE — 74177 CT ABDOMEN PELVIS WITH CONTRAST: ICD-10-PCS | Mod: 26,,, | Performed by: RADIOLOGY

## 2020-05-21 PROCEDURE — 25500020 PHARM REV CODE 255: Performed by: OBSTETRICS & GYNECOLOGY

## 2020-05-21 PROCEDURE — 74177 CT ABD & PELVIS W/CONTRAST: CPT | Mod: 26,,, | Performed by: RADIOLOGY

## 2020-05-21 RX ADMIN — IOHEXOL 15 ML: 350 INJECTION, SOLUTION INTRAVENOUS at 06:05

## 2020-05-21 RX ADMIN — IOHEXOL 100 ML: 350 INJECTION, SOLUTION INTRAVENOUS at 06:05

## 2020-05-21 RX ADMIN — IOHEXOL 15 ML: 350 INJECTION, SOLUTION INTRAVENOUS at 05:05

## 2020-05-22 ENCOUNTER — OFFICE VISIT (OUTPATIENT)
Dept: UROLOGY | Facility: CLINIC | Age: 55
End: 2020-05-22
Payer: MEDICAID

## 2020-05-22 VITALS
HEIGHT: 69 IN | BODY MASS INDEX: 32.03 KG/M2 | SYSTOLIC BLOOD PRESSURE: 146 MMHG | HEART RATE: 92 BPM | DIASTOLIC BLOOD PRESSURE: 85 MMHG | WEIGHT: 216.25 LBS

## 2020-05-22 DIAGNOSIS — N13.30 BILATERAL HYDRONEPHROSIS: Primary | ICD-10-CM

## 2020-05-22 DIAGNOSIS — N13.30 HYDRONEPHROSIS OF LEFT KIDNEY: ICD-10-CM

## 2020-05-22 PROCEDURE — 99213 OFFICE O/P EST LOW 20 MIN: CPT | Mod: PBBFAC | Performed by: UROLOGY

## 2020-05-22 PROCEDURE — 99999 PR PBB SHADOW E&M-EST. PATIENT-LVL III: ICD-10-PCS | Mod: PBBFAC,,, | Performed by: UROLOGY

## 2020-05-22 PROCEDURE — 81002 URINALYSIS NONAUTO W/O SCOPE: CPT | Mod: PBBFAC | Performed by: UROLOGY

## 2020-05-22 PROCEDURE — 99213 OFFICE O/P EST LOW 20 MIN: CPT | Mod: S$PBB,,, | Performed by: UROLOGY

## 2020-05-22 PROCEDURE — 99213 PR OFFICE/OUTPT VISIT, EST, LEVL III, 20-29 MIN: ICD-10-PCS | Mod: S$PBB,,, | Performed by: UROLOGY

## 2020-05-22 PROCEDURE — 99999 PR PBB SHADOW E&M-EST. PATIENT-LVL III: CPT | Mod: PBBFAC,,, | Performed by: UROLOGY

## 2020-05-22 RX ORDER — CIPROFLOXACIN 250 MG/1
500 TABLET, FILM COATED ORAL ONCE
Status: CANCELLED | OUTPATIENT
Start: 2020-05-22 | End: 2020-05-22

## 2020-05-22 RX ORDER — LIDOCAINE HYDROCHLORIDE 20 MG/ML
JELLY TOPICAL ONCE
Status: CANCELLED | OUTPATIENT
Start: 2020-05-22 | End: 2020-05-22

## 2020-05-22 NOTE — PROGRESS NOTES
CHIEF COMPLAINT:    Mrs. Riley is a 55 y.o. female presenting for a hospital follow up, stent failure now with percutaneous nephrostromy tubes.     PRESENTING ILLNESS:    Edita Riley is a 55 y.o. female who has a history of cervical cancer status post XRT, was hospitalized in March and was found to have right sided hydronephrosis.  She underwent bladder biopsy and fulguration and because of the XRT, went ahead and placed a left ureteral stents under the same anesthetic.  She later developed renal failure and ended up at [facility] where she had percutaneous nephrostomy tubes placed.  She was sent back for follow up.  She just had a CT scan done to determine if she has any residual cervical cancer, in consideration of urinary diversion.     She finds the percutaneous nephrostomy tubes uncomfortable and would like not to live with them if she can.     She has no symptoms of yeast in the urine.     REVIEW OF SYSTEMS:    Review of Systems   Constitutional: Negative.    HENT: Negative.    Eyes: Negative.    Respiratory: Negative.    Cardiovascular: Negative.    Gastrointestinal: Negative.    Genitourinary: Negative for dysuria.   Musculoskeletal: Positive for back pain (discomfort from perc nephrstomy tubes).   Skin: Negative.    Neurological: Negative.    Endo/Heme/Allergies: Negative.    Psychiatric/Behavioral: Negative.        PATIENT HISTORY:    Past Medical History:   Diagnosis Date    Anxiety     Cervical cancer 2014    Chronic back pain     Depression     Diarrhea due to malabsorption 11/14/2018    Fibromyalgia     GERD (gastroesophageal reflux disease)     Hiatal hernia 2014    History of cervical cancer 10/11/2018    Hx of psychiatric care     Cymbalta, trazodone    Hypertension     Hypomagnesemia 11/21/2018    Lactose intolerance     Metastatic squamous cell carcinoma to lymph node 10/11/2018    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 drug 11/14/2018    Osteoarthritis of  back     Psychiatric problem     Stroke 1972       Past Surgical History:   Procedure Laterality Date    BILATERAL OOPHORECTOMY  2015    CHOLECYSTECTOMY  11/09/2016    Done at [facility], path showed chronic cholecystitis and gallstones    cold knife conization  2014    COLONOSCOPY  2014    [facility]    COLONOSCOPY N/A 5/18/2018    Procedure: COLONOSCOPY;  Surgeon: Arden Gutiérrez MD;  Location: [facility] (4TH FLR);  Service: Endoscopy;  Laterality: N/A;    CYSTOSCOPY WITH URETEROSCOPY, RETROGRADE PYELOGRAPHY, AND INSERTION OF STENT Bilateral 3/21/2020    Procedure: CYSTOSCOPY, WITH RETROGRADE PYELOGRAM,;  Surgeon: Leslie Balbuena MD;  Location: [facility] OR 1ST FLR;  Service: Urology;  Laterality: Bilateral;    ESOPHAGOGASTRODUODENOSCOPY  2014    HYSTERECTOMY  2014    [facility] for cervical cancer    OOPHORECTOMY      RETROPERITONEAL LYMPHADENECTOMY Right 9/17/2018    Procedure: LYMPHADENECTOMY, RETROPERITONEUM;  Surgeon: Sebas Reed MD;  Location: [facility] OR 2ND FLR;  Service: General;  Laterality: Right;  ILIAC       Family History   Problem Relation Age of Onset    Heart disease Sister     Heart disease Maternal Grandmother     Colon cancer Father     Esophageal cancer Father     Cancer Father         Lung-smoker    Cancer Mother         Cervical    Cervical cancer Mother     Breast cancer Maternal Aunt     Suicide Daughter         jumped from parking structure    Drug abuse Daughter     Drug abuse Daughter      Socioeconomic History    Marital status:      Spouse name: Hammad    Number of children: 2   Tobacco Use    Smoking status: Former Smoker    Smokeless tobacco: Never Used   Substance and Sexual Activity    Alcohol use: No     Alcohol/week: 0.0 standard drinks    Drug use: No    Sexual activity: Yes     Partners: Male     Birth control/protection: None     Comment:  19 years since 1999   Social History Narrative    ,  twin daughters (1  2018), disabled due to childhood stroke, Jain sophia       Allergies:  Bee sting [allergen ext-venom-honey bee] and Grass pollen-bermuda, standard    Medications:  Outpatient Encounter Medications as of 2020   Medication Sig Dispense Refill    cephALEXin (KEFLEX) 500 MG capsule Take 500 mg by mouth.      amLODIPine (NORVASC) 5 MG tablet Take 5 mg by mouth.      docusate sodium (COLACE) 100 MG capsule Take 1 capsule (100 mg total) by mouth 2 (two) times daily as needed for Constipation. (Patient not taking: Reported on 2020) 30 capsule 0    gabapentin (NEURONTIN) 300 MG capsule Take 1 capsule (300 mg total) by mouth 3 (three) times daily. 270 capsule 3    lisinopril 10 MG tablet Take 1 tablet (10 mg total) by mouth once daily. (Patient not taking: Reported on 2020) 90 tablet 3    omeprazole (PRILOSEC) 40 MG capsule Take 1 capsule (40 mg total) by mouth once daily. 90 capsule 3    traZODone (DESYREL) 50 MG tablet TAKE 1 TABLET (50 MG TOTAL) BY MOUTH EVERY EVENING. 30 tablet 11     Facility-Administered Encounter Medications as of 2020   Medication Dose Route Frequency Provider Last Rate Last Dose    [DISCONTINUED] iohexoL (OMNIPAQUE 350) injection 15 mL  15 mL Oral ONCE PRN Refugio Lemon MD   15 mL at 20 1800    [DISCONTINUED] iohexoL (OMNIPAQUE 350) injection 15 mL  15 mL Oral ONCE PRN Refugio Lemon MD   15 mL at 20 1730         PHYSICAL EXAMINATION:    The patient generally appears in good health, is appropriately interactive, and is in no apparent distress.    Skin: No lesions.    Mental: Cooperative with normal affect.    Neuro: Grossly intact.    HEENT: Normal. No evidence of lymphadenopathy.    Chest:  normal inspiratory effort.    Abdomen:  Soft, non-tender. No masses or organomegaly. Bladder is not palpable. No evidence of flank discomfort. No evidence of inguinal hernia.    Extremities: No clubbing, cyanosis, or  edema      LABS:    Lab Results   Component Value Date    BUN 18 05/04/2020    CREATININE 1.3 05/04/2020     UA 1.010, pH 5, tr leuk, tr protein, 250 blood, otherwise, negative on Keflex    IMPRESSION:    Encounter Diagnoses   Name Primary?    Bilateral hydronephrosis Yes    Hydronephrosis of left kidney        PLAN:    1.  Discussed that she does not need the stents in place so will schedule for cystoscopy, bilateral stent removal.  2.  Will touch base with Dr. Lemon to determine the status of her cancer.  Discussed possibly diverting the urine with an ileal conduit.

## 2020-05-22 NOTE — PATIENT INSTRUCTIONS
Cystoscopy    Cystoscopy is a procedure that lets your doctor look directly inside your urethra and bladder. It can be used to:  · Help diagnose a problem with your urethra, bladder, or kidneys.  · Take a sample (biopsy) of bladder or urethral tissue.  · Treat certain problems (such as removing kidney stones).  · Place a stent to bypass an obstruction.  · Take special X-rays of the kidneys.  Based on the findings, your doctor may recommend other tests or treatments.  What is a cystoscope?  A cystoscope is a telescope-like instrument that contains lenses and fiberoptics (small glass wires that make bright light). The cystoscope may be straight and rigid, or flexible to bend around curves in the urethra. The doctor may look directly into the cystoscope, or project the image onto a monitor.  Getting ready  · Ask your doctor if you should stop taking any medicines before the procedure.  · Ask whether you should avoid eating or drinking anything after midnight before the procedure.  · Follow any other instructions your doctor gives you.  Tell your doctor before the exam if you:  · Take any medicines, such as aspirin or blood thinners  · Have allergies to any medicines  · Are pregnant   The procedure  Cystoscopy is done in the doctors office, surgery center, or hospital. The doctor and a nurse are present during the procedure. It takes only a few minutes, longer if a biopsy, X-ray, or treatment needs to be done.  During the procedure:  · You lie on an exam table on your back, knees bent and legs apart. You are covered with a drape.  · Your urethra and the area around it are washed. Anesthetic jelly may be applied to numb the urethra. Other pain medicine is usually not needed. In some cases, you may be offered a mild sedative to help you relax. If a more extensive procedure is to be done, such as a biopsy or kidney stone removal, general anesthesia may be needed.  · The cystoscope is inserted. A sterile fluid is put  into the bladder to expand it. You may feel pressure from this fluid.  · When the procedure is done, the cystoscope is removed.  After the procedure  If you had a sedative, general anesthesia, or spinal anesthesia, you must have someone drive you home. Once youre home:  · Drink plenty of fluids.  · You may have burning or light bleeding when you urinate--this is normal.  · Medicines may be prescribed to ease any discomfort or prevent infection. Take these as directed.  · Call your doctor if you have heavy bleeding or blood clots, burning that lasts more than a day, a fever over 100°F  (38° C), or trouble urinating.  Date Last Reviewed: 1/1/2017  © 9412-9379 [employer]. [address] [place] [address], [place] [address]. All rights reserved. This information is not intended as a substitute for professional medical care. Always follow your healthcare professional's instructions.

## 2020-05-27 ENCOUNTER — TELEPHONE (OUTPATIENT)
Dept: GYNECOLOGIC ONCOLOGY | Facility: CLINIC | Age: 55
End: 2020-05-27

## 2020-05-27 NOTE — TELEPHONE ENCOUNTER
Informed that CT scan does not show evidence for recurrence.  Has bladder wall thickening due to prior radiation.    Message has been sent to Dr. Balbuena as well.

## 2020-06-01 ENCOUNTER — PROCEDURE VISIT (OUTPATIENT)
Dept: UROLOGY | Facility: CLINIC | Age: 55
End: 2020-06-01
Payer: MEDICAID

## 2020-06-01 ENCOUNTER — TELEPHONE (OUTPATIENT)
Dept: GYNECOLOGIC ONCOLOGY | Facility: CLINIC | Age: 55
End: 2020-06-01

## 2020-06-01 VITALS
OXYGEN SATURATION: 100 % | BODY MASS INDEX: 31.99 KG/M2 | DIASTOLIC BLOOD PRESSURE: 63 MMHG | RESPIRATION RATE: 17 BRPM | WEIGHT: 216 LBS | SYSTOLIC BLOOD PRESSURE: 131 MMHG | HEART RATE: 86 BPM | HEIGHT: 69 IN | TEMPERATURE: 98 F

## 2020-06-01 DIAGNOSIS — N13.30 BILATERAL HYDRONEPHROSIS: ICD-10-CM

## 2020-06-01 PROCEDURE — 52310 CYSTOSCOPY AND TREATMENT: CPT | Mod: 50,PBBFAC | Performed by: UROLOGY

## 2020-06-01 PROCEDURE — 52310 CYSTOSCOPY AND TREATMENT: CPT | Mod: S$PBB,,, | Performed by: UROLOGY

## 2020-06-01 PROCEDURE — 52310 PR CYSTOSCOPY,REMV CALCULUS,SIMPLE: ICD-10-PCS | Mod: S$PBB,,, | Performed by: UROLOGY

## 2020-06-01 RX ORDER — LIDOCAINE HYDROCHLORIDE 20 MG/ML
JELLY TOPICAL ONCE
Status: COMPLETED | OUTPATIENT
Start: 2020-06-01 | End: 2020-06-01

## 2020-06-01 RX ORDER — CIPROFLOXACIN 250 MG/1
500 TABLET, FILM COATED ORAL ONCE
Status: COMPLETED | OUTPATIENT
Start: 2020-06-01 | End: 2020-06-01

## 2020-06-01 RX ADMIN — LIDOCAINE HYDROCHLORIDE: 20 JELLY TOPICAL at 10:06

## 2020-06-01 RX ADMIN — CIPROFLOXACIN 500 MG: 250 TABLET, FILM COATED ORAL at 10:06

## 2020-06-01 NOTE — PATIENT INSTRUCTIONS
What to Expect After a Cystoscopy with Stent Removal  For the next 24-48 hours, you may feel a mild burning when you urinate. This burning is normal and expected. Drink plenty of water to dilute the urine to help relieve the burning sensation. You may also see a small amount of blood in your urine after the procedure.    Unless you are already taking antibiotics, you may be given an antibiotic after the test to prevent infection.    Signs and Symptoms to Report  Call the [facility] at [phone] if you develop any of the following:  · Fever of 101 degrees or higher  · Chills or persistent bleeding  · Inability to urinate    After hours or on weekends, you may reach a urology resident on call at this number: [phone].

## 2020-06-01 NOTE — PROCEDURES
Procedures     Pre Procedure Diagnosis:  status post bilateral stent placement.  Patient has had stent failure, secondary to cervical cancer, status post XRT    Post Procedure Diagnosis:  same    Procedure:  Cystoscopy, bilateral ureteral stent removal    Anesthesia: 10 cc 2% lidocaine jelly applied per urethra.    14 FR Flexible Olympus cystoscope used.    FINDINGS:  Both Stents were removed in their entirety    The patient was taken to the cystoscopy suite and placed in dorsal lithotomy position.  The genitalia was prepped and draped  in the usual sterile fashion.  Two percent lidocaine jelly was inserted in the urethra.  After sufficent time had passed to allow good local anesthesia, the cystoscope was inserted in the urethra then bladder. The stent was visualized, grasped, and removed along with the cystoscope.  The patient was instructed to urinate proir to leaving the office.     Post procedure medication:  cipro 500 mg x 1     ASSESSMENT/PLAN: 55 year old  woman status post flexible cystoscopy with ureteral stent removal.  1. Push fluids for 24 hours.  2. May see blood in the urine, this should gradually improve over the next 2-3 days.  3. The patient was instructed to return to the office or go to the emergency should fever, chills, cloudy urine, or inability to urinate develop.  4. Follow up in the office for discussion about urinary diversion

## 2020-06-01 NOTE — TELEPHONE ENCOUNTER
Spoke with pt. States she has an appt to see Dr Lemon and the end of the week and then again in august. Informed pt I would confirm this with Dr Lemon but according to his note he wanted her to return to clinic in 3 months which would be august. Denies any other needs.   ----- Message from Poli Izaguirre sent at 6/1/2020  3:19 PM CDT -----  Contact: EMMA SWARTZ [[id number]]   Name of Who is Calling:     What is the request in detail:  Patient request call back in reference to scheduled appointment questions/concerns Please contact to further discuss and advise      Can the clinic reply by [facility]: no     What Number to [facility] Back [facility]:  [phone]

## 2020-06-01 NOTE — Clinical Note
Can you make her an appointment so I can discuss urinary diversion?  OK To be the last appointment of the day (not at Wednesday)  Thursday early afternoon OK.

## 2020-06-02 ENCOUNTER — TELEPHONE (OUTPATIENT)
Dept: GYNECOLOGIC ONCOLOGY | Facility: CLINIC | Age: 55
End: 2020-06-02

## 2020-06-03 ENCOUNTER — TELEPHONE (OUTPATIENT)
Dept: UROLOGY | Facility: CLINIC | Age: 55
End: 2020-06-03

## 2020-06-03 NOTE — TELEPHONE ENCOUNTER
----- Message from Leslie Balbuena MD sent at 6/1/2020 10:53 AM CDT -----  Can you make her an appointment so I can discuss urinary diversion?  OK To be the last appointment of the day (not at Wednesday)  Thursday early afternoon OK.

## 2020-06-03 NOTE — PROGRESS NOTES
"I have personally discused  this patient and agree with the resident's note as stated above with the following thoughts:   /78 (BP Location: Right arm, Patient Position: Sitting, BP Method: Large (Manual))   Pulse 97   Ht 5' 9" (1.753 m)   Wt 99.5 kg (219 lb 5.7 oz)   LMP 06/08/2014 (Approximate)   SpO2 98%   BMI 32.39 kg/m²     Discussed getting vaccines up to date.  Discussed importance of low fat diet and exercise.  Continue the gabapentin for her neuropathy and also for her fibromyalgia.  Looks like she is making her follow-up some schedule so far.    "

## 2020-06-08 ENCOUNTER — OFFICE VISIT (OUTPATIENT)
Dept: UROLOGY | Facility: CLINIC | Age: 55
End: 2020-06-08
Payer: MEDICAID

## 2020-06-08 VITALS
DIASTOLIC BLOOD PRESSURE: 72 MMHG | HEIGHT: 69 IN | WEIGHT: 209.88 LBS | BODY MASS INDEX: 31.09 KG/M2 | SYSTOLIC BLOOD PRESSURE: 135 MMHG | HEART RATE: 92 BPM

## 2020-06-08 DIAGNOSIS — N13.5 BILATERAL URETERAL OBSTRUCTION: Primary | ICD-10-CM

## 2020-06-08 PROCEDURE — 99213 OFFICE O/P EST LOW 20 MIN: CPT | Mod: S$PBB,,, | Performed by: UROLOGY

## 2020-06-08 PROCEDURE — 99213 OFFICE O/P EST LOW 20 MIN: CPT | Mod: PBBFAC | Performed by: UROLOGY

## 2020-06-08 PROCEDURE — 99213 PR OFFICE/OUTPT VISIT, EST, LEVL III, 20-29 MIN: ICD-10-PCS | Mod: S$PBB,,, | Performed by: UROLOGY

## 2020-06-08 PROCEDURE — 99999 PR PBB SHADOW E&M-EST. PATIENT-LVL III: CPT | Mod: PBBFAC,,, | Performed by: UROLOGY

## 2020-06-08 PROCEDURE — 99999 PR PBB SHADOW E&M-EST. PATIENT-LVL III: ICD-10-PCS | Mod: PBBFAC,,, | Performed by: UROLOGY

## 2020-06-08 NOTE — PROGRESS NOTES
CHIEF COMPLAINT:    Mrs. Riley is a 55 y.o. female presenting for a follow up after ureteral stent removal in a patient with history of stent failure.      PRESENTING ILLNESS:    Edita Riley is a 55 y.o. female who has a history of cervical cancer status post XRT, was hospitalized in March, found to have right hydronephrosis.  She had lesions in her bladder which were biopsied and fulgurated, and shown to be inflammation.  At the same time, she had a left stent placed because it was early in the pandemic and since XRT was the etiology of the hydronephrosis on the right, wanted to prevent her from having to come in for the left side.  She later developed renal failure but went to [facility] where percutaneous nephrostomy tubes were placed.      She saw Dr. Lemon in follow up and was found to be cancer free.  She cannot tolerate the percutaneous nephrostomy tubes and is willing to undergo urinary diversion.  Review of her CT scan reveals that the transverse colon is well placed and slightly redundant.      REVIEW OF SYSTEMS:    Review of Systems   Constitutional: Negative.    HENT: Negative.    Eyes: Negative.    Respiratory: Negative.    Cardiovascular: Negative.    Gastrointestinal: Negative.    Genitourinary:        Bilateral hydronephrosis   Musculoskeletal: Positive for back pain and myalgias.   Skin: Negative.    Neurological: Negative.    Endo/Heme/Allergies: Negative.    Psychiatric/Behavioral: Negative.        PATIENT HISTORY:    Past Medical History:   Diagnosis Date    Anxiety     Cervical cancer 2014    Chronic back pain     Depression     Diarrhea due to malabsorption 11/14/2018    Fibromyalgia     GERD (gastroesophageal reflux disease)     Hiatal hernia 2014    History of cervical cancer 10/11/2018    Hx of psychiatric care     Cymbalta, trazodone    Hypertension     Hypomagnesemia 11/21/2018    Lactose intolerance     Metastatic squamous cell carcinoma to lymph  node 10/11/2018    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 drug 11/14/2018    Osteoarthritis of back     Psychiatric problem     Stroke 1972       Past Surgical History:   Procedure Laterality Date    BILATERAL OOPHORECTOMY  2015    CHOLECYSTECTOMY  11/09/2016    Done at [facility], path showed chronic cholecystitis and gallstones    cold knife conization  2014    COLONOSCOPY  2014    [facility]    COLONOSCOPY N/A 5/18/2018    Procedure: COLONOSCOPY;  Surgeon: Arden Gutiérrez MD;  Location: [facility] (4TH FLR);  Service: Endoscopy;  Laterality: N/A;    CYSTOSCOPY WITH URETEROSCOPY, RETROGRADE PYELOGRAPHY, AND INSERTION OF STENT Bilateral 3/21/2020    Procedure: CYSTOSCOPY, WITH RETROGRADE PYELOGRAM,;  Surgeon: Leslie Balbuena MD;  Location: [facility] OR 1ST FLR;  Service: Urology;  Laterality: Bilateral;    ESOPHAGOGASTRODUODENOSCOPY  2014    HYSTERECTOMY  2014    [facility] for cervical [facility]    OOPHORECTOMY      RETROPERITONEAL LYMPHADENECTOMY Right 9/17/2018    Procedure: LYMPHADENECTOMY, RETROPERITONEUM;  Surgeon: Sebas Reed MD;  Location: [facility] OR 2ND FLR;  Service: General;  Laterality: Right;  ILIAC       Family History   Problem Relation Age of Onset    Heart disease Sister     Heart disease Maternal Grandmother     Colon cancer Father     Esophageal cancer Father     Cancer Father         Lung-smoker    Cancer Mother         Cervical    Cervical cancer Mother     Breast cancer Maternal Aunt     Suicide Daughter         jumped from parking structure    Drug abuse Daughter     Drug abuse Daughter      Socioeconomic History    Marital status:      Spouse name: Hammad    Number of children: 2   Tobacco Use    Smoking status: Former Smoker    Smokeless tobacco: Never Used   Substance and Sexual Activity    Alcohol use: No     Alcohol/week: 0.0 standard drinks    Drug use: No    Sexual activity: Yes     Partners: Male     Birth  control/protection: None     Comment:  19 years since    Social History Narrative    , twin daughters (1  2018), disabled due to childhood stroke, Nondenominational sophia       Allergies:  Bee sting [allergen ext-venom-honey bee] and Grass pollen-bermuda, standard    Medications:  Outpatient Encounter Medications as of 2020   Medication Sig Dispense Refill    amLODIPine (NORVASC) 5 MG tablet Take 5 mg by mouth.      cephALEXin (KEFLEX) 500 MG capsule Take 500 mg by mouth.      docusate sodium (COLACE) 100 MG capsule Take 1 capsule (100 mg total) by mouth 2 (two) times daily as needed for Constipation. 30 capsule 0    gabapentin (NEURONTIN) 300 MG capsule Take 1 capsule (300 mg total) by mouth 3 (three) times daily. 270 capsule 3    lisinopril 10 MG tablet Take 1 tablet (10 mg total) by mouth once daily. 90 tablet 3    omeprazole (PRILOSEC) 40 MG capsule Take 1 capsule (40 mg total) by mouth once daily. 90 capsule 3    traZODone (DESYREL) 50 MG tablet TAKE 1 TABLET (50 MG TOTAL) BY MOUTH EVERY EVENING. 30 tablet 11     No facility-administered encounter medications on file as of 2020.          PHYSICAL EXAMINATION:    The patient generally appears in good health, is appropriately interactive, and is in no apparent distress.    Skin: No lesions.    Mental: Cooperative with normal affect.    Neuro: Grossly intact.    HEENT: Normal. No evidence of lymphadenopathy.    Chest:  normal inspiratory effort.    Abdomen:  Soft, non-tender. No masses or organomegaly. Bladder is not palpable. No evidence of flank discomfort. No evidence of inguinal hernia.    Extremities: No clubbing, cyanosis, or edema      LABS:    Lab Results   Component Value Date    BUN 18 2020    CREATININE 1.3 2020       IMPRESSION:    Encounter Diagnoses   Name Primary?    Bilateral ureteral obstruction Yes       PLAN:    1.  Referred for colonoscopy to Dr. Reynolds and will plan a colon conduit      Copy to:  Dr. Lemon

## 2020-06-09 ENCOUNTER — TELEPHONE (OUTPATIENT)
Dept: SURGERY | Facility: CLINIC | Age: 55
End: 2020-06-09

## 2020-06-09 NOTE — TELEPHONE ENCOUNTER
----- Message from Pamela Mann sent at 6/9/2020  9:24 AM CDT -----  Contact: [phone]  Calling to schedule appointment for consult for colonoscopy. Please call

## 2020-06-10 ENCOUNTER — HOSPITAL ENCOUNTER (INPATIENT)
Facility: HOSPITAL | Age: 55
LOS: 2 days | Discharge: HOME | DRG: 699 | End: 2020-06-12
Attending: EMERGENCY MEDICINE | Admitting: EMERGENCY MEDICINE
Payer: MEDICAID

## 2020-06-10 DIAGNOSIS — N12 PYELONEPHRITIS: Primary | ICD-10-CM

## 2020-06-10 DIAGNOSIS — N13.30 HYDRONEPHROSIS, UNSPECIFIED HYDRONEPHROSIS TYPE: ICD-10-CM

## 2020-06-10 DIAGNOSIS — R07.9 CHEST PAIN: ICD-10-CM

## 2020-06-10 LAB
ALBUMIN SERPL BCP-MCNC: 3.5 G/DL (ref 3.5–5.2)
ALP SERPL-CCNC: 112 U/L (ref 55–135)
ALT SERPL W/O P-5'-P-CCNC: 19 U/L (ref 10–44)
ANION GAP SERPL CALC-SCNC: 11 MMOL/L (ref 8–16)
AST SERPL-CCNC: 20 U/L (ref 10–40)
BACTERIA #/AREA URNS AUTO: ABNORMAL /HPF
BASOPHILS # BLD AUTO: 0.03 K/UL (ref 0–0.2)
BASOPHILS NFR BLD: 0.3 % (ref 0–1.9)
BILIRUB SERPL-MCNC: 0.4 MG/DL (ref 0.1–1)
BILIRUB UR QL STRIP: NEGATIVE
BUN SERPL-MCNC: 13 MG/DL (ref 6–20)
CALCIUM SERPL-MCNC: 9.6 MG/DL (ref 8.7–10.5)
CHLORIDE SERPL-SCNC: 101 MMOL/L (ref 95–110)
CLARITY UR REFRACT.AUTO: ABNORMAL
CO2 SERPL-SCNC: 28 MMOL/L (ref 23–29)
COLOR UR AUTO: YELLOW
CREAT SERPL-MCNC: 1.4 MG/DL (ref 0.5–1.4)
DIFFERENTIAL METHOD: ABNORMAL
EOSINOPHIL # BLD AUTO: 0.2 K/UL (ref 0–0.5)
EOSINOPHIL NFR BLD: 1.7 % (ref 0–8)
ERYTHROCYTE [DISTWIDTH] IN BLOOD BY AUTOMATED COUNT: 13.9 % (ref 11.5–14.5)
EST. GFR  (AFRICAN AMERICAN): 48.1 ML/MIN/1.73 M^2
EST. GFR  (NON AFRICAN AMERICAN): 41.7 ML/MIN/1.73 M^2
GLUCOSE SERPL-MCNC: 118 MG/DL (ref 70–110)
GLUCOSE UR QL STRIP: NEGATIVE
HCT VFR BLD AUTO: 34.6 % (ref 37–48.5)
HGB BLD-MCNC: 10.3 G/DL (ref 12–16)
HGB UR QL STRIP: ABNORMAL
HYALINE CASTS UR QL AUTO: 0 /LPF
IMM GRANULOCYTES # BLD AUTO: 0.03 K/UL (ref 0–0.04)
IMM GRANULOCYTES NFR BLD AUTO: 0.3 % (ref 0–0.5)
KETONES UR QL STRIP: NEGATIVE
LEUKOCYTE ESTERASE UR QL STRIP: ABNORMAL
LIPASE SERPL-CCNC: 14 U/L (ref 4–60)
LYMPHOCYTES # BLD AUTO: 1.4 K/UL (ref 1–4.8)
LYMPHOCYTES NFR BLD: 12.4 % (ref 18–48)
MCH RBC QN AUTO: 27.2 PG (ref 27–31)
MCHC RBC AUTO-ENTMCNC: 29.8 G/DL (ref 32–36)
MCV RBC AUTO: 92 FL (ref 82–98)
MICROSCOPIC COMMENT: ABNORMAL
MONOCYTES # BLD AUTO: 0.9 K/UL (ref 0.3–1)
MONOCYTES NFR BLD: 8 % (ref 4–15)
NEUTROPHILS # BLD AUTO: 8.6 K/UL (ref 1.8–7.7)
NEUTROPHILS NFR BLD: 77.3 % (ref 38–73)
NITRITE UR QL STRIP: NEGATIVE
NRBC BLD-RTO: 0 /100 WBC
PH UR STRIP: 7 [PH] (ref 5–8)
PLATELET # BLD AUTO: 363 K/UL (ref 150–350)
PMV BLD AUTO: 9.9 FL (ref 9.2–12.9)
POTASSIUM SERPL-SCNC: 3.6 MMOL/L (ref 3.5–5.1)
PROT SERPL-MCNC: 8.1 G/DL (ref 6–8.4)
PROT UR QL STRIP: ABNORMAL
RBC # BLD AUTO: 3.78 M/UL (ref 4–5.4)
RBC #/AREA URNS AUTO: 7 /HPF (ref 0–4)
SODIUM SERPL-SCNC: 140 MMOL/L (ref 136–145)
SP GR UR STRIP: 1.01 (ref 1–1.03)
URN SPEC COLLECT METH UR: ABNORMAL
WBC # BLD AUTO: 11.06 K/UL (ref 3.9–12.7)
WBC #/AREA URNS AUTO: 97 /HPF (ref 0–5)
WBC CLUMPS UR QL AUTO: ABNORMAL

## 2020-06-10 PROCEDURE — 81001 URINALYSIS AUTO W/SCOPE: CPT

## 2020-06-10 PROCEDURE — 99284 EMERGENCY DEPT VISIT MOD MDM: CPT | Mod: ,,, | Performed by: PHYSICIAN ASSISTANT

## 2020-06-10 PROCEDURE — S0028 INJECTION, FAMOTIDINE, 20 MG: HCPCS | Performed by: PHYSICIAN ASSISTANT

## 2020-06-10 PROCEDURE — 85025 COMPLETE CBC W/AUTO DIFF WBC: CPT

## 2020-06-10 PROCEDURE — 63600175 PHARM REV CODE 636 W HCPCS: Performed by: PHYSICIAN ASSISTANT

## 2020-06-10 PROCEDURE — 83690 ASSAY OF LIPASE: CPT

## 2020-06-10 PROCEDURE — 12000002 HC ACUTE/MED SURGE SEMI-PRIVATE ROOM

## 2020-06-10 PROCEDURE — 25000003 PHARM REV CODE 250: Performed by: PHYSICIAN ASSISTANT

## 2020-06-10 PROCEDURE — 96375 TX/PRO/DX INJ NEW DRUG ADDON: CPT

## 2020-06-10 PROCEDURE — 80053 COMPREHEN METABOLIC PANEL: CPT

## 2020-06-10 PROCEDURE — 87086 URINE CULTURE/COLONY COUNT: CPT

## 2020-06-10 PROCEDURE — 96361 HYDRATE IV INFUSION ADD-ON: CPT

## 2020-06-10 PROCEDURE — 96365 THER/PROPH/DIAG IV INF INIT: CPT

## 2020-06-10 PROCEDURE — 99285 EMERGENCY DEPT VISIT HI MDM: CPT | Mod: 25

## 2020-06-10 PROCEDURE — 25500020 PHARM REV CODE 255: Performed by: EMERGENCY MEDICINE

## 2020-06-10 PROCEDURE — 99284 PR EMERGENCY DEPT VISIT,LEVEL IV: ICD-10-PCS | Mod: ,,, | Performed by: PHYSICIAN ASSISTANT

## 2020-06-10 RX ORDER — ONDANSETRON 2 MG/ML
4 INJECTION INTRAMUSCULAR; INTRAVENOUS
Status: COMPLETED | OUTPATIENT
Start: 2020-06-10 | End: 2020-06-10

## 2020-06-10 RX ORDER — FAMOTIDINE 10 MG/ML
20 INJECTION INTRAVENOUS
Status: COMPLETED | OUTPATIENT
Start: 2020-06-10 | End: 2020-06-10

## 2020-06-10 RX ORDER — CEFTRIAXONE 1 G/1
1 INJECTION, POWDER, FOR SOLUTION INTRAMUSCULAR; INTRAVENOUS
Status: COMPLETED | OUTPATIENT
Start: 2020-06-10 | End: 2020-06-10

## 2020-06-10 RX ADMIN — PROMETHAZINE HYDROCHLORIDE 12.5 MG: 25 INJECTION INTRAMUSCULAR; INTRAVENOUS at 09:06

## 2020-06-10 RX ADMIN — CEFTRIAXONE SODIUM 1 G: 1 INJECTION, POWDER, FOR SOLUTION INTRAMUSCULAR; INTRAVENOUS at 10:06

## 2020-06-10 RX ADMIN — SODIUM CHLORIDE 1000 ML: 0.9 INJECTION, SOLUTION INTRAVENOUS at 08:06

## 2020-06-10 RX ADMIN — ONDANSETRON HYDROCHLORIDE 4 MG: 2 SOLUTION INTRAMUSCULAR; INTRAVENOUS at 08:06

## 2020-06-10 RX ADMIN — FAMOTIDINE 20 MG: 10 INJECTION INTRAVENOUS at 08:06

## 2020-06-10 RX ADMIN — IOHEXOL 100 ML: 350 INJECTION, SOLUTION INTRAVENOUS at 10:06

## 2020-06-11 PROBLEM — N13.30 HYDRONEPHROSIS, LEFT: Status: ACTIVE | Noted: 2020-06-11

## 2020-06-11 PROBLEM — N39.0 URINARY TRACT INFECTION ASSOCIATED WITH NEPHROSTOMY CATHETER: Status: ACTIVE | Noted: 2020-06-11

## 2020-06-11 PROBLEM — N12 PYELONEPHRITIS: Status: ACTIVE | Noted: 2020-06-11

## 2020-06-11 PROBLEM — T83.512A URINARY TRACT INFECTION ASSOCIATED WITH NEPHROSTOMY CATHETER: Status: ACTIVE | Noted: 2020-06-11

## 2020-06-11 LAB
ALBUMIN SERPL BCP-MCNC: 2.9 G/DL (ref 3.5–5.2)
ALP SERPL-CCNC: 99 U/L (ref 55–135)
ALT SERPL W/O P-5'-P-CCNC: 16 U/L (ref 10–44)
ANION GAP SERPL CALC-SCNC: 9 MMOL/L (ref 8–16)
APTT BLDCRRT: 27.9 SEC (ref 21–32)
AST SERPL-CCNC: 18 U/L (ref 10–40)
BACTERIA UR CULT: NORMAL
BACTERIA UR CULT: NORMAL
BASOPHILS # BLD AUTO: 0.03 K/UL (ref 0–0.2)
BASOPHILS NFR BLD: 0.3 % (ref 0–1.9)
BILIRUB SERPL-MCNC: 0.2 MG/DL (ref 0.1–1)
BUN SERPL-MCNC: 10 MG/DL (ref 6–20)
CALCIUM SERPL-MCNC: 9 MG/DL (ref 8.7–10.5)
CHLORIDE SERPL-SCNC: 106 MMOL/L (ref 95–110)
CO2 SERPL-SCNC: 29 MMOL/L (ref 23–29)
CREAT SERPL-MCNC: 1.2 MG/DL (ref 0.5–1.4)
DIFFERENTIAL METHOD: ABNORMAL
EOSINOPHIL # BLD AUTO: 0.2 K/UL (ref 0–0.5)
EOSINOPHIL NFR BLD: 1.7 % (ref 0–8)
ERYTHROCYTE [DISTWIDTH] IN BLOOD BY AUTOMATED COUNT: 14 % (ref 11.5–14.5)
EST. GFR  (AFRICAN AMERICAN): 58 ML/MIN/1.73 M^2
EST. GFR  (NON AFRICAN AMERICAN): 50.3 ML/MIN/1.73 M^2
GLUCOSE SERPL-MCNC: 128 MG/DL (ref 70–110)
HCT VFR BLD AUTO: 32.9 % (ref 37–48.5)
HGB BLD-MCNC: 10 G/DL (ref 12–16)
IMM GRANULOCYTES # BLD AUTO: 0.02 K/UL (ref 0–0.04)
IMM GRANULOCYTES NFR BLD AUTO: 0.2 % (ref 0–0.5)
INR PPP: 1 (ref 0.8–1.2)
LYMPHOCYTES # BLD AUTO: 1.4 K/UL (ref 1–4.8)
LYMPHOCYTES NFR BLD: 13.9 % (ref 18–48)
MAGNESIUM SERPL-MCNC: 2 MG/DL (ref 1.6–2.6)
MCH RBC QN AUTO: 27.8 PG (ref 27–31)
MCHC RBC AUTO-ENTMCNC: 30.4 G/DL (ref 32–36)
MCV RBC AUTO: 91 FL (ref 82–98)
MONOCYTES # BLD AUTO: 1.1 K/UL (ref 0.3–1)
MONOCYTES NFR BLD: 10.2 % (ref 4–15)
NEUTROPHILS # BLD AUTO: 7.6 K/UL (ref 1.8–7.7)
NEUTROPHILS NFR BLD: 73.7 % (ref 38–73)
NRBC BLD-RTO: 0 /100 WBC
PHOSPHATE SERPL-MCNC: 4.1 MG/DL (ref 2.7–4.5)
PLATELET # BLD AUTO: 373 K/UL (ref 150–350)
PMV BLD AUTO: 10.1 FL (ref 9.2–12.9)
POCT GLUCOSE: 167 MG/DL (ref 70–110)
POTASSIUM SERPL-SCNC: 4.1 MMOL/L (ref 3.5–5.1)
PROT SERPL-MCNC: 7.3 G/DL (ref 6–8.4)
PROTHROMBIN TIME: 10.6 SEC (ref 9–12.5)
RBC # BLD AUTO: 3.6 M/UL (ref 4–5.4)
SODIUM SERPL-SCNC: 144 MMOL/L (ref 136–145)
WBC # BLD AUTO: 10.28 K/UL (ref 3.9–12.7)

## 2020-06-11 PROCEDURE — 63600175 PHARM REV CODE 636 W HCPCS: Performed by: RADIOLOGY

## 2020-06-11 PROCEDURE — 85610 PROTHROMBIN TIME: CPT

## 2020-06-11 PROCEDURE — 85025 COMPLETE CBC W/AUTO DIFF WBC: CPT

## 2020-06-11 PROCEDURE — 63600175 PHARM REV CODE 636 W HCPCS: Performed by: STUDENT IN AN ORGANIZED HEALTH CARE EDUCATION/TRAINING PROGRAM

## 2020-06-11 PROCEDURE — 83735 ASSAY OF MAGNESIUM: CPT

## 2020-06-11 PROCEDURE — 11000001 HC ACUTE MED/SURG PRIVATE ROOM

## 2020-06-11 PROCEDURE — 63600175 PHARM REV CODE 636 W HCPCS: Performed by: INTERNAL MEDICINE

## 2020-06-11 PROCEDURE — 99223 PR INITIAL HOSPITAL CARE,LEVL III: ICD-10-PCS | Mod: ,,, | Performed by: INTERNAL MEDICINE

## 2020-06-11 PROCEDURE — 36415 COLL VENOUS BLD VENIPUNCTURE: CPT

## 2020-06-11 PROCEDURE — 87040 BLOOD CULTURE FOR BACTERIA: CPT

## 2020-06-11 PROCEDURE — 63600175 PHARM REV CODE 636 W HCPCS: Performed by: PHYSICIAN ASSISTANT

## 2020-06-11 PROCEDURE — 99223 1ST HOSP IP/OBS HIGH 75: CPT | Mod: ,,, | Performed by: INTERNAL MEDICINE

## 2020-06-11 PROCEDURE — 85730 THROMBOPLASTIN TIME PARTIAL: CPT

## 2020-06-11 PROCEDURE — 80053 COMPREHEN METABOLIC PANEL: CPT

## 2020-06-11 PROCEDURE — 25000003 PHARM REV CODE 250: Performed by: STUDENT IN AN ORGANIZED HEALTH CARE EDUCATION/TRAINING PROGRAM

## 2020-06-11 PROCEDURE — 84100 ASSAY OF PHOSPHORUS: CPT

## 2020-06-11 RX ORDER — IBUPROFEN 200 MG
16 TABLET ORAL
Status: DISCONTINUED | OUTPATIENT
Start: 2020-06-11 | End: 2020-06-12 | Stop reason: HOSPADM

## 2020-06-11 RX ORDER — OXYCODONE HYDROCHLORIDE 5 MG/1
5 TABLET ORAL EVERY 8 HOURS PRN
Status: DISCONTINUED | OUTPATIENT
Start: 2020-06-11 | End: 2020-06-12 | Stop reason: HOSPADM

## 2020-06-11 RX ORDER — PROMETHAZINE HYDROCHLORIDE 12.5 MG/1
25 TABLET ORAL EVERY 6 HOURS PRN
Status: DISCONTINUED | OUTPATIENT
Start: 2020-06-11 | End: 2020-06-12 | Stop reason: HOSPADM

## 2020-06-11 RX ORDER — ONDANSETRON 2 MG/ML
4 INJECTION INTRAMUSCULAR; INTRAVENOUS EVERY 8 HOURS PRN
Status: DISCONTINUED | OUTPATIENT
Start: 2020-06-11 | End: 2020-06-12 | Stop reason: HOSPADM

## 2020-06-11 RX ORDER — FENTANYL CITRATE 50 UG/ML
INJECTION, SOLUTION INTRAMUSCULAR; INTRAVENOUS CODE/TRAUMA/SEDATION MEDICATION
Status: COMPLETED | OUTPATIENT
Start: 2020-06-11 | End: 2020-06-11

## 2020-06-11 RX ORDER — MIDAZOLAM HYDROCHLORIDE 1 MG/ML
INJECTION INTRAMUSCULAR; INTRAVENOUS CODE/TRAUMA/SEDATION MEDICATION
Status: COMPLETED | OUTPATIENT
Start: 2020-06-11 | End: 2020-06-11

## 2020-06-11 RX ORDER — IBUPROFEN 200 MG
24 TABLET ORAL
Status: DISCONTINUED | OUTPATIENT
Start: 2020-06-11 | End: 2020-06-12 | Stop reason: HOSPADM

## 2020-06-11 RX ORDER — VANCOMYCIN HCL IN 5 % DEXTROSE 1G/250ML
1000 PLASTIC BAG, INJECTION (ML) INTRAVENOUS
Status: COMPLETED | OUTPATIENT
Start: 2020-06-11 | End: 2020-06-11

## 2020-06-11 RX ORDER — SODIUM CHLORIDE 0.9 % (FLUSH) 0.9 %
10 SYRINGE (ML) INJECTION
Status: DISCONTINUED | OUTPATIENT
Start: 2020-06-11 | End: 2020-06-12 | Stop reason: HOSPADM

## 2020-06-11 RX ORDER — PANTOPRAZOLE SODIUM 40 MG/1
40 TABLET, DELAYED RELEASE ORAL DAILY
Status: DISCONTINUED | OUTPATIENT
Start: 2020-06-11 | End: 2020-06-12 | Stop reason: HOSPADM

## 2020-06-11 RX ORDER — VANCOMYCIN HCL IN 5 % DEXTROSE 1G/250ML
1000 PLASTIC BAG, INJECTION (ML) INTRAVENOUS ONCE
Status: COMPLETED | OUTPATIENT
Start: 2020-06-11 | End: 2020-06-11

## 2020-06-11 RX ORDER — GLUCAGON 1 MG
1 KIT INJECTION
Status: DISCONTINUED | OUTPATIENT
Start: 2020-06-11 | End: 2020-06-12 | Stop reason: HOSPADM

## 2020-06-11 RX ORDER — ENOXAPARIN SODIUM 100 MG/ML
40 INJECTION SUBCUTANEOUS EVERY 24 HOURS
Status: DISCONTINUED | OUTPATIENT
Start: 2020-06-11 | End: 2020-06-11

## 2020-06-11 RX ADMIN — CEFTRIAXONE 2 G: 2 INJECTION, SOLUTION INTRAVENOUS at 04:06

## 2020-06-11 RX ADMIN — FENTANYL CITRATE 25 MCG: 50 INJECTION, SOLUTION INTRAMUSCULAR; INTRAVENOUS at 12:06

## 2020-06-11 RX ADMIN — MIDAZOLAM HYDROCHLORIDE 1 MG: 1 INJECTION, SOLUTION INTRAMUSCULAR; INTRAVENOUS at 12:06

## 2020-06-11 RX ADMIN — OXYCODONE HYDROCHLORIDE 5 MG: 5 TABLET ORAL at 03:06

## 2020-06-11 RX ADMIN — FENTANYL CITRATE 50 MCG: 50 INJECTION, SOLUTION INTRAMUSCULAR; INTRAVENOUS at 12:06

## 2020-06-11 RX ADMIN — VANCOMYCIN HYDROCHLORIDE 1000 MG: 1 INJECTION, POWDER, LYOPHILIZED, FOR SOLUTION INTRAVENOUS at 01:06

## 2020-06-11 RX ADMIN — MIDAZOLAM HYDROCHLORIDE 0.5 MG: 1 INJECTION, SOLUTION INTRAMUSCULAR; INTRAVENOUS at 12:06

## 2020-06-11 RX ADMIN — VANCOMYCIN HYDROCHLORIDE 1000 MG: 1 INJECTION, POWDER, LYOPHILIZED, FOR SOLUTION INTRAVENOUS at 06:06

## 2020-06-11 RX ADMIN — OXYCODONE HYDROCHLORIDE 5 MG: 5 TABLET ORAL at 11:06

## 2020-06-11 NOTE — SUBJECTIVE & OBJECTIVE
Past Medical History:   Diagnosis Date    Anxiety     Cervical cancer 2014    Chronic back pain     Depression     Diarrhea due to malabsorption 11/14/2018    Fibromyalgia     GERD (gastroesophageal reflux disease)     Hiatal hernia 2014    History of cervical cancer 10/11/2018    Hx of psychiatric care     Cymbalta, trazodone    Hypertension     Hypomagnesemia 11/21/2018    Lactose intolerance     Metastatic squamous cell carcinoma to lymph node 10/11/2018    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 drug 11/14/2018    Osteoarthritis of back     Psychiatric problem     Stroke 1972       Past Surgical History:   Procedure Laterality Date    BILATERAL OOPHORECTOMY  2015    CHOLECYSTECTOMY  11/09/2016    Done at [facility], path showed chronic cholecystitis and gallstones    cold knife conization  2014    COLONOSCOPY  2014    COLONOSCOPY N/A 10/24/2016    at [facility], Dr Gutiérrez    COLONOSCOPY N/A 5/18/2018    Procedure: COLONOSCOPY;  Surgeon: Arden Gutiérrez MD;  Location: [facility] (4TH FLR);  Service: Endoscopy;  Laterality: N/A;    CYSTOSCOPY WITH URETEROSCOPY, RETROGRADE PYELOGRAPHY, AND INSERTION OF STENT Bilateral 3/21/2020    Procedure: CYSTOSCOPY, WITH RETROGRADE PYELOGRAM,;  Surgeon: Leslie Balbuena MD;  Location: [facility] OR 1ST FLR;  Service: Urology;  Laterality: Bilateral;    ESOPHAGOGASTRODUODENOSCOPY  2014    HYST[facility]CTOMY  2014    [facility] for cervical cancer    OOPHORECTOMY      RETROPERITONEAL LYMPHADENECTOMY Right 9/17/2018    Procedure: LYMPHADENECTOMY, RETROPERITONEUM;  Surgeon: Sebas Reed MD;  Location: [facility] OR 2ND FLR;  Service: General;  Laterality: Right;  ILIAC       Review of patient's allergies indicates:   Allergen Reactions    Bee sting [allergen ext-venom-honey bee]      Rash      Grass pollen-bermuda, standard      rash       Family History     Problem Relation (Age of Onset)    Breast cancer Maternal Aunt    Cancer Father, Mother    Cervical cancer Mother    Colon  cancer Father    Drug abuse Daughter, Daughter    Esophageal cancer Father    Heart disease Sister, Maternal Grandmother    Suicide Daughter          Tobacco Use    Smoking status: Former Smoker    Smokeless tobacco: Never Used   Substance and Sexual Activity    Alcohol use: No     Alcohol/week: 0.0 standard drinks    Drug use: No    Sexual activity: Yes     Partners: Male     Birth control/protection: None     Comment:  19 years since 1999       Review of Systems   Constitutional: Positive for appetite change. Negative for chills and fever.   HENT: Negative.    Eyes: Negative.    Respiratory: Negative for chest tightness and shortness of breath.    Cardiovascular: Negative for chest pain and palpitations.   Gastrointestinal: Positive for abdominal pain, nausea and vomiting. Negative for diarrhea.   Genitourinary: Negative for difficulty urinating, dysuria, flank pain, frequency and urgency.   Musculoskeletal: Negative for arthralgias and gait problem.   Skin: Negative for color change and rash.   Neurological: Negative for dizziness and headaches.   Psychiatric/Behavioral: Negative for agitation and confusion.       Objective:     Temp:  [98.2 °F (36.8 °C)-99.4 °F (37.4 °C)] 98.2 °F (36.8 °C)  Pulse:  [74-98] 74  Resp:  [16-18] 18  SpO2:  [96 %-98 %] 96 %  BP: (125-152)/(65-74) 152/70     Body mass index is 30.86 kg/m².           Drains     None                 Physical Exam   Nursing note and vitals reviewed.  Constitutional: She is oriented to person, place, and time. She appears well-developed and well-nourished. No distress.   HENT:   Head: Normocephalic and atraumatic.   Eyes: Pupils are equal, round, and reactive to light. Right eye exhibits no discharge. Left eye exhibits no discharge.   Cardiovascular: Normal rate.    Pulmonary/Chest: Effort normal. No respiratory distress.   Abdominal: Soft. She exhibits no distension. There is no tenderness.   No CVA tenderness   Genitourinary:   Genitourinary  Comments: Right nephrostomy with clear yellow urine in bag, tube irrigates and aspirates easily,  surrounding site clean/dry/intact  Left nephrostomy tube with clear yellow urine in bag, irrigates easily but difficulty with aspiration, surrounding site clean/dry/intact   Neurological: She is alert and oriented to person, place, and time.   Skin: Skin is warm and dry.     Psychiatric: She has a normal mood and affect. Her behavior is normal. Judgment and thought content normal.       Significant Labs:    BMP:  Recent Labs   Lab 06/10/20  1955 06/11/20  0325    144   K 3.6 4.1    106   CO2 28 29   BUN 13 10   CREATININE 1.4 1.2   CALCIUM 9.6 9.0       CBC:  Recent Labs   Lab 06/10/20  1955 06/11/20  0325   WBC 11.06 10.28   HGB 10.3* 10.0*   HCT 34.6* 32.9*   * 373*       All pertinent labs results from the past 24 hours have been reviewed.    Significant Imaging:  Per my read:  CT abdomen/pelvis with contrast (6/10/20):  - Bilateral adrenals unremarkable  - Right kidney with moderate hydronephrosis, right nephrostomy tube coil appears to be partially intraparenchymal, delayed nephrogram  - Right ureter normal in course and caliber with surrounding stranding  - Left kidney with mild hydronephrosis, left nephrostomy tube coil appears to be in the collecting system  - Left ureter normal in course and caliber with surrounding stranding  - Bladder unremarkable

## 2020-06-11 NOTE — PROCEDURES
Radiology Post-Procedure Note    Pre Op Diagnosis: right and left hydronephrosis    Post Op Diagnosis: right and left hydronephrosis    Procedure:right and left percutaneous nephrostomy tube change    Procedure performed by: Shaw Alcantara MD    Written Informed Consent Obtained: Yes    Specimen Removed: NO    Estimated Blood Loss: Minimal    Findings: Local anesthesia and moderate sedation were used.      The indwelling nephrostomy tube was removed over a wire and a new 12 drainage catheter was placed under fluoroscopic guidance.  The drain was secured in place and dressed.      There were no complications and the patient tolerated the procedure well.  Please see Imaging report for further details.      Shaw Alcantara M.D.  Diagnostic and Interventional Radiologist  Department of Radiology  Pager: [phone]

## 2020-06-11 NOTE — ASSESSMENT & PLAN NOTE
Pt states she takes Amlodipine 5 mg and Lisinopril 10 mg at home for HTN. BP at 129/74 on admission.     -Hold for now in setting of active infection, can re-start as needed

## 2020-06-11 NOTE — ASSESSMENT & PLAN NOTE
"Ms. Riley is a 55yF who presents to ED with acute onset vomiting and abdominal pain. Denies any redness/pain around nephrostomy tube sites, no increased output. Still makes urine but denies any dysuria or hematuria. Some mild increased frequency but no urgency. She does have hx of multiple prior UTIs and was recently taking Cephalexin at home for UTI ppx. Prior cultures grew out E-coli and Candida. Pt has bilateral nephrostomy tubes in place (April 2020 at [facility]) for bilateral hydronephrosis. 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 Follows with Dr. Balbuean in clinic. Planning for colon conduit procedure in future as pt is "not tolerating" nephrostomy tubes per recent Urology note from 3 days ago.    On arrival to the ED she was afebrile and HDS with BP of 129/74, HR 98, RR 18 and satting 98% on room air. UA positive for 3+ protein, 1+ blood, 3+ leukocytes, 97 WBC, and many bacteria. WBC at 11. Lipase at 14. She was given 1L NS as well as started on Vanc and Ceftriaxone. CT A/P done in ED today showed "bilateral urothelial enhancement and thickening with periureteral and perinephric inflammatory change concerning for superimposed ascending urinary tract infection/pyelonephritis." CT scan also noted moderate right-sided hydro and mild left-sided hydro (increased from prior scan 1 month ago).      Plan:  -Continue Vanc and Ceftriaxone for now, can narrow based on culture results  -Urine gram stain pending  -F/u Urine and Blood Cultures  -Urology consulted given CT findings of increasing bilateral hydronephrosis   -Zofran 4 mg Q8 hrs prn for nausea  -Oxycodone 5 mg Q8 hrs prn for pain  -Follow renal function with daily labs, currently appears at baseline     "

## 2020-06-11 NOTE — H&P
"[facility] Medicine  History & Physical    Patient Name: Edita Riley  MRN: [id number]  Admission Date: 6/10/2020  Attending Physician: Zenon Hall MD   Primary Care Provider: Audrey Mustafa MD         Patient information was obtained from patient, past medical records and ER records.     Subjective:     Principal Problem:Pyelonephritis    Chief Complaint:   Chief Complaint   Patient presents with    Abdominal Pain     Pt reports abdominal pain x1 day. Pt states "i cant eat grease .. and i ate grease, so my stomach is hurting .. its started around about 4 something." Pt reports recent kidney surgery. Pt has bilateral nephrostomy tubes.         HPI: Ms. Riley is a 55yF with history of cervical cancer s/p chemo and radiation with radiation cystitis and bilateral nephrostomy tubes (placed April 2020 at [facility]) who presents to ED with acute onset vomiting and abdominal pain after eating fried eggs and BBQ ribs. States that she does not typically eat these kinds of heavy, greasy foods. States she was feeling fine prior to this episode. Recently saw her Urologist in clinic who is planning on colon conduit surgery in future to remove nephrostomy tubes. Denies fevers/chills/weakness. Denies any redness/pain around nephrostomy tube sites, no increased output. Still makes urine but denies any dysuria or hematuria. Some mild increased frequency but no urgency. No recent sick contacts. No diarrhea. No one else eating the foods had similar symptoms. She does have hx of multiple prior UTIs and was recently taking Cephalexin at home for UTI ppx. Denies illicit drug-use or cigarette use. No alcohol use. Her other medical history is notable for HTN, GERD, Fibromyalgia, and Anemia of Chronic Disease. Home meds include Lisinopril, Amlodipine, Oxycodone prn and PPI prn.     On arrival to the ED she was afebrile and HDS with BP of 129/74, HR 98, RR 18 and satting 98% on room air. UA " positive for 3+ protein, 1+ blood, 3+ leukocytes, 97 WBC, and many bacteria. WBC was within normal limits. Lipase at 14. She was given 1L NS as well as started on Vanc and Ceftriaxone.     Past Medical History:   Diagnosis Date    Anxiety     Cervical cancer 2014    Chronic back pain     Depression     Diarrhea due to malabsorption 11/14/2018    Fibromyalgia     GERD (gastroesophageal reflux disease)     Hiatal hernia 2014    History of cervical cancer 10/11/2018    Hx of psychiatric care     Cymbalta, trazodone    Hypertension     Hypomagnesemia 11/21/2018    Lactose intolerance     Metastatic squamous cell carcinoma to lymph node 10/11/2018    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 drug 11/14/2018    Osteoarthritis of back     Psychiatric problem     Stroke 1972       Past Surgical History:   Procedure Laterality Date    BILATERAL OOPHORECTOMY  2015    CHOLECYSTECTOMY  11/09/2016    Done at [facility], path showed chronic cholecystitis and gallstones    cold knife conization  2014    COLONOSCOPY  2014    [facility]    COLONOSCOPY N/A 5/18/2018    Procedure: COLONOSCOPY;  Surgeon: Arden Gutiérrez MD;  Location: [facility] (4TH FLR);  Service: Endoscopy;  Laterality: N/A;    CYSTOSCOPY WITH URETEROSCOPY, RETROGRADE PYELOGRAPHY, AND INSERTION OF STENT Bilateral 3/21/2020    Procedure: CYSTOSCOPY, WITH RETROGRADE PYELOGRAM,;  Surgeon: Leslie Balbuena MD;  [facility];  Service: Urology;  Laterality: Bilateral;    ESOPHAGOGASTRODUODENOSCOPY  2014    HYSTERECTOMY  2014    [facility] for cervical cancer    OOPHORECTOMY      RETROPERITONEAL LYMPHADENECTOMY Right 9/17/2018    Procedure: LYMPHADENECTOMY, RETROPERITONEUM;  Surgeon: Sebas Reed MD;  [facility];  Service: General;  Laterality: Right;  ILIAC       Review of patient's allergies indicates:   Allergen Reactions    Bee sting [allergen ext-venom-honey bee]      Rash       Grass pollen-bermuda, standard      rash       No current facility-administered medications on file prior to encounter.      Current Outpatient Medications on File Prior to Encounter   Medication Sig    amLODIPine (NORVASC) 5 MG tablet Take 5 mg by mouth.    cephALEXin (KEFLEX) 500 MG capsule Take 500 mg by mouth.    docusate sodium (COLACE) 100 MG capsule Take 1 capsule (100 mg total) by mouth 2 (two) times daily as needed for Constipation.    gabapentin (NEURONTIN) 300 MG capsule Take 1 capsule (300 mg total) by mouth 3 (three) times daily.    lisinopril 10 MG tablet Take 1 tablet (10 mg total) by mouth once daily.    omeprazole (PRILOSEC) 40 MG capsule Take 1 capsule (40 mg total) by mouth once daily.    traZODone (DESYREL) 50 MG tablet TAKE 1 TABLET (50 MG TOTAL) BY MOUTH EVERY EVENING.     Family History     Problem Relation (Age of Onset)    Breast cancer Maternal Aunt    Cancer Father, Mother    Cervical cancer Mother    Colon cancer Father    Drug abuse Daughter, Daughter    Esophageal cancer Father    Heart disease Sister, Maternal Grandmother    Suicide Daughter        Tobacco Use    Smoking status: Former Smoker    Smokeless tobacco: Never Used   Substance and Sexual Activity    Alcohol use: No     Alcohol/week: 0.0 standard drinks    Drug use: No    Sexual activity: Yes     Partners: Male     Birth control/protection: None     Comment:  19 years since 1999     Review of Systems   Constitutional: Positive for fatigue. Negative for chills and fever.   HENT: Negative for congestion and trouble swallowing.    Eyes: Negative for visual disturbance.   Respiratory: Negative for cough, shortness of breath and wheezing.    Cardiovascular: Negative for chest pain, palpitations and leg swelling.   Gastrointestinal: Positive for abdominal pain, nausea and vomiting. Negative for diarrhea.   Genitourinary: Positive for frequency. Negative for dysuria, flank pain and hematuria.   Musculoskeletal:  Negative for back pain.   Skin: Negative for rash and wound.   Neurological: Negative for dizziness, tremors and light-headedness.   Psychiatric/Behavioral: Negative for confusion.     Objective:     Vital Signs (Most Recent):  Temp: 98.3 °F (36.8 °C) (06/11/20 0215)  Pulse: 82 (06/11/20 0215)  Resp: 18 (06/11/20 0215)  BP: 137/65 (06/11/20 0215)  SpO2: 97 % (06/11/20 0215) Vital Signs (24h Range):  Temp:  [98.3 °F (36.8 °C)-99.4 °F (37.4 °C)] 98.3 °F (36.8 °C)  Pulse:  [82-98] 82  Resp:  [16-18] 18  SpO2:  [97 %-98 %] 97 %  BP: (125-137)/(65-74) 137/65        There is no height or weight on file to calculate BMI.    Physical Exam   Constitutional: She is oriented to person, place, and time. She appears well-developed and well-nourished. No distress.   HENT:   Head: Normocephalic.   Eyes: EOM are normal. No scleral icterus.   Neck: Normal range of motion. Neck supple.   Cardiovascular: Normal rate.   Murmur heard.  Pulmonary/Chest: Effort normal and breath sounds normal. No respiratory distress. She has no wheezes.   Abdominal: Soft. She exhibits no distension. There is no tenderness. There is no guarding.   Musculoskeletal: Normal range of motion. She exhibits no edema.   Bilateral nephrostomy tubes in place on low back. Covered in dressing that was replaced in ED. No noted tenderness, erythema, drainage, purulence of either site. Draining yellow urine in both bags. No CVA tenderness on either side.   Neurological: She is alert and oriented to person, place, and time.   Skin: Skin is warm and dry. No rash noted. She is not diaphoretic. No erythema.   Psychiatric: She has a normal mood and affect.   Nursing note and vitals reviewed.        CRANIAL NERVES     CN III, IV, VI   Extraocular motions are normal.        Significant Labs:   Blood Culture: No results for input(s): LABBLOO in the last 48 hours.  BMP:   Recent Labs   Lab 06/10/20  1955   *      K 3.6      CO2 28   BUN 13   CREATININE 1.4    CALCIUM 9.6     CBC:   Recent Labs   Lab 06/10/20  1955   WBC 11.06   HGB 10.3*   HCT 34.6*   *     CMP:   Recent Labs   Lab 06/10/20  1955      K 3.6      CO2 28   *   BUN 13   CREATININE 1.4   CALCIUM 9.6   PROT 8.1   ALBUMIN 3.5   BILITOT 0.4   ALKPHOS 112   AST 20   ALT 19   ANIONGAP 11   EGFRNONAA 41.7*     Cardiac Markers: No results for input(s): CKMB, MYOGLOBIN, BNP, TROPISTAT in the last 48 hours.  Coagulation: No results for input(s): PT, INR, APTT in the last 48 hours.  Lactic Acid: No results for input(s): LACTATE in the last 48 hours.  POCT Glucose:   Recent Labs   Lab 06/11/20  0223   POCTGLUCOSE 167*     Urine Culture: No results for input(s): LABURIN in the last 48 hours.  Urine Studies:   Recent Labs   Lab 06/10/20  1955   COLORU Yellow   APPEARANCEUA Cloudy*   PHUR 7.0   SPECGRAV 1.010   PROTEINUA 3+*   GLUCUA Negative   KETONESU Negative   BILIRUBINUA Negative   OCCULTUA 1+*   NITRITE Negative   LEUKOCYTESUR 3+*   RBCUA 7*   WBCUA 97*   BACTERIA Many*   HYALINECASTS 0       Significant Imaging:   Imaging Results          CT Abdomen Pelvis With Contrast (Final result)  Result time 06/10/20 23:34:55    Final result by Fransisco Richards MD (06/10/20 23:34:55)                 Impression:      1.  Bilateral nephrostomy tubes in stable position with interval removal of double-J ureteral stents.  There is moderate right-sided and mild left-sided hydronephrosis, progressed compared to prior examination.  Additionally, there is prominent urothelial thickening and enhancement with associated inflammatory change concerning for ascending urinary tract infection/pyelonephritis.    2.  Status post hysterectomy in this patient with history of cervical cancer.  Limited assessment of the bladder secondary to nondistention.    3.  Small volume of free fluid within the pelvis, similar to prior examination.    4.  Additional incidental findings as above.      Electronically signed  by: Fransisco Richards MD  Date:    06/10/2020  Time:    23:34             Narrative:    EXAMINATION:  CT ABDOMEN PELVIS WITH CONTRAST    CLINICAL HISTORY:  Infection, abdomen-pelvis;    TECHNIQUE:  Low dose axial images, sagittal and coronal reformations were obtained from the lung bases to the pubic symphysis following the IV administration of 100 mL of Omnipaque 350 .  Oral contrast was not given.    COMPARISON:  CT 05/21/2020, 04/19/2020    FINDINGS:  The lung bases are unremarkable.  There is no pleural fluid present.  The visualized portions of the heart appear normal.    The liver is normal in size.  There is a stable appearing geographic region of parenchymal hypoattenuation adjacent to the falciform ligament that may relate to focal fatty infiltration.  The gallbladder is surgically absent.  There is no intra-or extrahepatic biliary ductal dilatation.    There is a small hiatal hernia.  The stomach otherwise appears within normal limits.  The spleen, pancreas, and adrenal glands are unremarkable.    There are bilateral nephrostomy tubes present.  Bilateral double-J ureteral stents have been intervally removed.  There is moderate right-sided hydronephrosis and mild left-sided hydronephrosis.  This appears mildly worsened compared to prior examination.  There is mild asymmetric diminished enhancement of the right kidney relative to the left.  Contrast is present within the collecting systems on the delayed phase, although not within the ureters.  There is prominent bilateral urothelial enhancement and thickening with periureteral and perinephric inflammatory change concerning for superimposed ascending urinary tract infection/pyelonephritis, particularly on the right.  There is a stable subcentimeter left renal hypodensity.  The urinary bladder is collapsed limiting assessment.  The patient is status post hysterectomy.  No discrete adnexal masses identified.  There are several small pelvic lymph nodes again  "demonstrated, similar to prior examination.  There is a small volume of free fluid in the pelvis.    The abdominal aorta is normal in course and caliber without significant atherosclerotic calcifications.    The visualized loops of small and large bowel show no evidence of obstruction or inflammation.  The appendix appears within normal limits.  There is no free intraperitoneal air or portal venous gas.    The osseous structures demonstrate mild degenerative changes.  The extraperitoneal soft tissues are unremarkable.                              Assessment/Plan:     * Pyelonephritis  Urinary tract infection associated with nephrostomy catheter  Hydronephrosis of right kidney  Hydronephrosis of left kidney  Ms. Riley is a 55yF who presents to ED with acute onset vomiting and abdominal pain. Denies any redness/pain around nephrostomy tube sites, no increased output. Still makes urine but denies any dysuria or hematuria. Some mild increased frequency but no urgency. She does have hx of multiple prior UTIs and was recently taking Cephalexin at home for UTI ppx. Prior cultures grew out E-coli and Candida. Pt has bilateral nephrostomy tubes in place (April 2020 at [facility]) for bilateral hydronephrosis. 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 Follows with Dr. Balbuena in clinic. Planning for colon conduit procedure in future as pt is "not tolerating" nephrostomy tubes per recent Urology note from 3 days ago.    On arrival to the ED she was afebrile and HDS with BP of 129/74, HR 98, RR 18 and satting 98% on room air. UA positive for 3+ protein, 1+ blood, 3+ leukocytes, 97 WBC, and many bacteria. WBC at 11. Lipase at 14. She was given 1L NS as well as started on Vanc and Ceftriaxone. CT A/P done in ED today showed "bilateral urothelial enhancement and thickening with periureteral and perinephric inflammatory change concerning for superimposed ascending urinary tract " "infection/pyelonephritis." CT scan also noted moderate right-sided hydro and mild left-sided hydro (increased from prior scan 1 month ago).      Plan:  -Continue Vanc and Ceftriaxone for now, can narrow based on culture results  -Urine gram stain pending  -F/u Urine and Blood Cultures  -Urology consulted given CT findings of increasing bilateral hydronephrosis   -Zofran 4 mg Q8 hrs prn for nausea  -Oxycodone 5 mg Q8 hrs prn for pain  -Follow renal function with daily labs, currently appears at baseline         Anemia due to chronic kidney disease  Pt with CKD3. Cr at 1.4 which appears to be her baseline. Hb at 10.3 which also appears to be at baseline.    -Repeat iron studies ordered          History of cervical cancer  Hx of metastatic cervical cancer s/p chemo and radiation with complication of radiation cystitis and subsequent placement of bilateral nephrostomy tubes.        Gastroesophageal reflux disease with esophagitis  Continue PPI 40 mg daily      Essential hypertension  Pt states she takes Amlodipine 5 mg and Lisinopril 10 mg at home for HTN. BP at 129/74 on admission.     -Hold for now in setting of active infection, can re-start as needed          VTE Risk Mitigation (From admission, onward)         Ordered     enoxaparin injection 40 mg  Daily      06/11/20 0150     IP VTE HIGH RISK PATIENT  Once      06/11/20 0150     Place sequential compression device  Until discontinued      06/11/20 0150                   Nicky Méndez MD  Department of Hospital Medicine   [facility]  "

## 2020-06-11 NOTE — SUBJECTIVE & OBJECTIVE
Past Medical History:   Diagnosis Date    Anxiety     Cervical cancer 2014    Chronic back pain     Depression     Diarrhea due to malabsorption 11/14/2018    Fibromyalgia     GERD (gastroesophageal reflux disease)     Hiatal hernia 2014    History of cervical cancer 10/11/2018    Hx of psychiatric care     Cymbalta, trazodone    Hypertension     Hypomagnesemia 11/21/2018    Lactose intolerance     Metastatic squamous cell carcinoma to lymph node 10/11/2018    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 drug 11/14/2018    Osteoarthritis of back     Psychiatric problem     Stroke 1972       Past Surgical History:   Procedure Laterality Date    BILATERAL OOPHORECTOMY  2015    CHOLECYSTECTOMY  11/09/2016    Done at [facility], path showed chronic cholecystitis and gallstones    cold knife conization  2014    COLONOSCOPY  2014    [facility]    COLONOSCOPY N/A 5/18/2018    Procedure: COLONOSCOPY;  Surgeon: Arden Gutiérrez MD;  Location: [facility] (4TH FLR);  Service: Endoscopy;  Laterality: N/A;    CYSTOSCOPY WITH URETEROSCOPY, RETROGRADE PYELOGRAPHY, AND INSERTION OF STENT Bilateral 3/21/2020    Procedure: CYSTOSCOPY, WITH RETROGRADE PYELOGRAM,;  Surgeon: Leslie Balbuena MD;  Location: [facility] OR 1ST FLR;  Service: Urology;  Laterality: Bilateral;    ESOPHAGOGASTRODUODENOSCOPY  2014    HYSTERECTOMY  2014    [facility] for cervical cancer    OOPHORECTOMY      RETROPERITONEAL LYMPHADENECTOMY Right 9/17/2018    Procedure: LYMPHADENECTOMY, RETROPERITONEUM;  Surgeon: Sebas Reed MD;  Location: [facility] OR 2ND FLR;  Service: General;  Laterality: Right;  ILIAC       Review of patient's allergies indicates:   Allergen Reactions    Bee sting [allergen ext-venom-honey bee]      Rash      Grass pollen-bermuda, standard      rash       No current facility-administered medications on file prior to encounter.      Current Outpatient Medications on File Prior to Encounter    Medication Sig    amLODIPine (NORVASC) 5 MG tablet Take 5 mg by mouth.    cephALEXin (KEFLEX) 500 MG capsule Take 500 mg by mouth.    docusate sodium (COLACE) 100 MG capsule Take 1 capsule (100 mg total) by mouth 2 (two) times daily as needed for Constipation.    gabapentin (NEURONTIN) 300 MG capsule Take 1 capsule (300 mg total) by mouth 3 (three) times daily.    lisinopril 10 MG tablet Take 1 tablet (10 mg total) by mouth once daily.    omeprazole (PRILOSEC) 40 MG capsule Take 1 capsule (40 mg total) by mouth once daily.    traZODone (DESYREL) 50 MG tablet TAKE 1 TABLET (50 MG TOTAL) BY MOUTH EVERY EVENING.     Family History     Problem Relation (Age of Onset)    Breast cancer Maternal Aunt    Cancer Father, Mother    Cervical cancer Mother    Colon cancer Father    Drug abuse Daughter, Daughter    Esophageal cancer Father    Heart disease Sister, Maternal Grandmother    Suicide Daughter        Tobacco Use    Smoking status: Former Smoker    Smokeless tobacco: Never Used   Substance and Sexual Activity    Alcohol use: No     Alcohol/week: 0.0 standard drinks    Drug use: No    Sexual activity: Yes     Partners: Male     Birth control/protection: None     Comment:  19 years since 1999     Review of Systems   Constitutional: Positive for fatigue. Negative for chills and fever.   HENT: Negative for congestion and trouble swallowing.    Eyes: Negative for visual disturbance.   Respiratory: Negative for cough, shortness of breath and wheezing.    Cardiovascular: Negative for chest pain, palpitations and leg swelling.   Gastrointestinal: Positive for abdominal pain, nausea and vomiting. Negative for diarrhea.   Genitourinary: Positive for frequency. Negative for dysuria, flank pain and hematuria.   Musculoskeletal: Negative for back pain.   Skin: Negative for rash and wound.   Neurological: Negative for dizziness, tremors and light-headedness.   Psychiatric/Behavioral: Negative for confusion.      Objective:     Vital Signs (Most Recent):  Temp: 98.3 °F (36.8 °C) (06/11/20 0215)  Pulse: 82 (06/11/20 0215)  Resp: 18 (06/11/20 0215)  BP: 137/65 (06/11/20 0215)  SpO2: 97 % (06/11/20 0215) Vital Signs (24h Range):  Temp:  [98.3 °F (36.8 °C)-99.4 °F (37.4 °C)] 98.3 °F (36.8 °C)  Pulse:  [82-98] 82  Resp:  [16-18] 18  SpO2:  [97 %-98 %] 97 %  BP: (125-137)/(65-74) 137/65        There is no height or weight on file to calculate BMI.    Physical Exam   Constitutional: She is oriented to person, place, and time. She appears well-developed and well-nourished. No distress.   HENT:   Head: Normocephalic.   Eyes: EOM are normal. No scleral icterus.   Neck: Normal range of motion. Neck supple.   Cardiovascular: Normal rate.   Murmur heard.  Pulmonary/Chest: Effort normal and breath sounds normal. No respiratory distress. She has no wheezes.   Abdominal: Soft. She exhibits no distension. There is no tenderness. There is no guarding.   Musculoskeletal: Normal range of motion. She exhibits no edema.   Bilateral nephrostomy tubes in place on low back. Covered in dressing that was replaced in ED. No noted tenderness, erythema, drainage, purulence of either site. Draining yellow urine in both bags. No CVA tenderness on either side.   Neurological: She is alert and oriented to person, place, and time.   Skin: Skin is warm and dry. No rash noted. She is not diaphoretic. No erythema.   Psychiatric: She has a normal mood and affect.   Nursing note and vitals reviewed.        CRANIAL NERVES     CN III, IV, VI   Extraocular motions are normal.        Significant Labs:   Blood Culture: No results for input(s): LABBLOO in the last 48 hours.  BMP:   Recent Labs   Lab 06/10/20  1955   *      K 3.6      CO2 28   BUN 13   CREATININE 1.4   CALCIUM 9.6     CBC:   Recent Labs   Lab 06/10/20  1955   WBC 11.06   HGB 10.3*   HCT 34.6*   *     CMP:   Recent Labs   Lab 06/10/20  1955      K 3.6      CO2 28    *   BUN 13   CREATININE 1.4   CALCIUM 9.6   PROT 8.1   ALBUMIN 3.5   BILITOT 0.4   ALKPHOS 112   AST 20   ALT 19   ANIONGAP 11   EGFRNONAA 41.7*     Cardiac Markers: No results for input(s): CKMB, MYOGLOBIN, BNP, TROPISTAT in the last 48 hours.  Coagulation: No results for input(s): PT, INR, APTT in the last 48 hours.  Lactic Acid: No results for input(s): LACTATE in the last 48 hours.  POCT Glucose:   Recent Labs   Lab 06/11/20  0223   POCTGLUCOSE 167*     Urine Culture: No results for input(s): LABURIN in the last 48 hours.  Urine Studies:   Recent Labs   Lab 06/10/20  1955   COLORU Yellow   APPEARANCEUA Cloudy*   PHUR 7.0   SPECGRAV 1.010   PROTEINUA 3+*   GLUCUA Negative   KETONESU Negative   BILIRUBINUA Negative   OCCULTUA 1+*   NITRITE Negative   LEUKOCYTESUR 3+*   RBCUA 7*   WBCUA 97*   BACTERIA Many*   HYALINECASTS 0       Significant Imaging:   Imaging Results          CT Abdomen Pelvis With Contrast (Final result)  Result time 06/10/20 23:34:55    Final result by Fransisco Richards MD (06/10/20 23:34:55)                 Impression:      1.  Bilateral nephrostomy tubes in stable position with interval removal of double-J ureteral stents.  There is moderate right-sided and mild left-sided hydronephrosis, progressed compared to prior examination.  Additionally, there is prominent urothelial thickening and enhancement with associated inflammatory change concerning for ascending urinary tract infection/pyelonephritis.    2.  Status post hysterectomy in this patient with history of cervical cancer.  Limited assessment of the bladder secondary to nondistention.    3.  Small volume of free fluid within the pelvis, similar to prior examination.    4.  Additional incidental findings as above.      Electronically signed by: Fransisco Richards MD  Date:    06/10/2020  Time:    23:34             Narrative:    EXAMINATION:  CT ABDOMEN PELVIS WITH CONTRAST    CLINICAL HISTORY:  Infection,  abdomen-pelvis;    TECHNIQUE:  Low dose axial images, sagittal and coronal reformations were obtained from the lung bases to the pubic symphysis following the IV administration of 100 mL of Omnipaque 350 .  Oral contrast was not given.    COMPARISON:  CT 05/21/2020, 04/19/2020    FINDINGS:  The lung bases are unremarkable.  There is no pleural fluid present.  The visualized portions of the heart appear normal.    The liver is normal in size.  There is a stable appearing geographic region of parenchymal hypoattenuation adjacent to the falciform ligament that may relate to focal fatty infiltration.  The gallbladder is surgically absent.  There is no intra-or extrahepatic biliary ductal dilatation.    There is a small hiatal hernia.  The stomach otherwise appears within normal limits.  The spleen, pancreas, and adrenal glands are unremarkable.    There are bilateral nephrostomy tubes present.  Bilateral double-J ureteral stents have been intervally removed.  There is moderate right-sided hydronephrosis and mild left-sided hydronephrosis.  This appears mildly worsened compared to prior examination.  There is mild asymmetric diminished enhancement of the right kidney relative to the left.  Contrast is present within the collecting systems on the delayed phase, although not within the ureters.  There is prominent bilateral urothelial enhancement and thickening with periureteral and perinephric inflammatory change concerning for superimposed ascending urinary tract infection/pyelonephritis, particularly on the right.  There is a stable subcentimeter left renal hypodensity.  The urinary bladder is collapsed limiting assessment.  The patient is status post hysterectomy.  No discrete adnexal masses identified.  There are several small pelvic lymph nodes again demonstrated, similar to prior examination.  There is a small volume of free fluid in the pelvis.    The abdominal aorta is normal in course and caliber without  significant atherosclerotic calcifications.    The visualized loops of small and large bowel show no evidence of obstruction or inflammation.  The appendix appears within normal limits.  There is no free intraperitoneal air or portal venous gas.    The osseous structures demonstrate mild degenerative changes.  The extraperitoneal soft tissues are unremarkable.

## 2020-06-11 NOTE — PLAN OF CARE
Problem: Adult Inpatient Plan of Care  Goal: Plan of Care Review  Outcome: Ongoing, Progressing     Problem: Electrolyte Imbalance (Acute Kidney Injury/Impairment)  Goal: Serum Electrolyte Balance  Outcome: Ongoing, Progressing     Problem: Fall Injury Risk  Goal: Absence of Fall and Fall-Related Injury  Outcome: Ongoing, Progressing     POC reviewed with pt at bedside.  Verbalized understanding.  AAOx4.  Slept well and no acute events.  Nephrostomy tubes drained.  Output good and tubes patent and intact.  Instructed on ss of infection, fall risks and hygiene.  Questions answered and encouraged.  Instructed to call for assistance.  Bed low and locked.  Bed alarm on and audible.  Call light within reach and WCTM.

## 2020-06-11 NOTE — PROVIDER PROGRESS NOTES - EMERGENCY DEPT.
" Emergency Department TeleTRIAGE Encounter Note      CHIEF COMPLAINT    Chief Complaint   Patient presents with    Abdominal Pain     Pt reports abdominal pain x1 day. Pt states "i cant eat grease .. and i ate grease, so my stomach is hurting .. its started around about 6 something." Pt reports recent kidney surgery. Pt has bilateral nephrostomy tubes.        VITAL SIGNS   Initial Vitals [06/10/20 1921]   BP Pulse Resp Temp SpO2   129/74 98 18 99.4 °F (37.4 °C) 98 %      MAP       --            ALLERGIES    Review of patient's allergies indicates:   Allergen Reactions    Bee sting [allergen ext-venom-honey bee]      Rash      Grass pollen-bermuda, standard      rash       PROVIDER TRIAGE NOTE  Patient with past medical history anxiety, depression, chronic pain, fibromyalgia, GERD, hypertension presents to the ED for evaluation of abdominal pain.  Patient states she has epigastric abdominal pain that started after eating grease around 6:00 p.m. today.  She has had a few episodes of nausea and vomiting.  She denies diarrhea.  She denies hematemesis.  She has not taken any medications.  Patient has had a cholecystectomy.  Patient also has bilateral nephrostomy tubes.      ORDERS  Labs Reviewed - No data to display    ED Orders (720h ago, onward)    Start Ordered     Status Ordering Provider    06/10/20 1927 06/10/20 1926  Vital signs  Every 2 hours      Ordered LIZMAHESH G.    06/10/20 1927 06/10/20 1926  Diet NPO  Diet effective now      Ordered LIZ, MAHESH G.    06/10/20 1927 06/10/20 1926  Insert peripheral IV  Once      Ordered LIZMAHESH G.    06/10/20 1927 06/10/20 1926  CBC W/ AUTO DIFFERENTIAL  STAT      Ordered LIZ, MAHESH G.    06/10/20 1927 06/10/20 1926  Comp. Metabolic Panel  STAT      Ordered LIZMELANIEY G.    06/10/20 1927 06/10/20 1926  Lipase  STAT      Ordered LIZ MAHESH G.    06/10/20 1927 06/10/20 1926  Urinalysis, Reflex to Urine Culture Urine, Clean Catch  STAT      Ordered LIZ, " MAHESH SOMERS            Virtual Visit Note: The provider triage portion of this emergency department evaluation and documentation was performed via [employer], a HIPAA-compliant telemedicine application, in concert with a tele-presenter in the room. A face to face patient evaluation with one of my colleagues will occur once the patient is placed in an emergency department room.      DISCLAIMER: This note was prepared with [employer] voice recognition transcription software. Garbled syntax, mangled pronouns, and other bizarre constructions may be attributed to that software system.

## 2020-06-11 NOTE — ASSESSMENT & PLAN NOTE
Hx of metastatic cervical cancer s/p chemo and radiation with complication of radiation cystitis and subsequent placement of bilateral nephrostomy tubes.

## 2020-06-11 NOTE — CONSULTS
[facility]  Urology  Consult Note    Patient Name: Edita Riley  MRN: [id number]  Admission Date: 6/10/2020  Hospital Length of Stay: 0   Code Status: Full Code   Attending Provider: Kacey Bergeron MD   Consulting Provider: Gokul Conway MD  Primary Care Physician: Audrey Mustafa MD  Principal Problem:Hydronephrosis    Inpatient consult to Urology  Consult performed by: Gokul Conway MD  Consult ordered by: Nicky Méndez MD          Subjective:     HPI:  Edita Riley is a 55 y.o. female who has a history of cervical cancer status post hysterectomy and chemo/XRT. After she was found to have right sided hydronephrosis, she underwent R ureteral stent placement on 3/23/20; also underwent L ureteral stent placement then as a prophylactic measure (to avoid having patient need to return during COVID19 pandemic.) She later was admitted to [facility] with renal failure and worsening bilateral hydronephrosis, so she had bilateral nephrostomy tubes placed on 4/30/20. Ureteral stents were removed in clinic on 6/1/20. She is a patient of Dr. Dumas and has plans to have urinary diversion with a colon conduit eventually. She was admitted to [facility] to Hospital Medicine on 6/10 for nausea, vomiting, and abdominal pain after eating greasy food which have both improved. She denies fevers, chills, suprapubic pain, flank pain, frequency, or urgency. She states that she has had equal urine output from both nephrostomy tubes. CT a/p with contrast shows moderate right hydronephrosis and mild left hydronephrosis. Her right nephrostomy tube coil appears to be partially intraparenchymal. She is afebrile and her vitals are stable.Her WBC is normal and her Cr is at her baseline. Clean catch UA was nitrite negative and showed 7 RBC/hpf, 97 WBC/hpf, and many bacteria.    Past Medical History:   Diagnosis Date    Anxiety     Cervical cancer 2014    Chronic back  pain     Depression     Diarrhea due to malabsorption 11/14/2018    Fibromyalgia     GERD (gastroesophageal reflux disease)     Hiatal hernia 2014    History of cervical cancer 10/11/2018    Hx of psychiatric care     Cymbalta, trazodone    Hypertension     Hypomagnesemia 11/21/2018    Lactose intolerance     Metastatic squamous cell carcinoma to lymph node 10/11/2018    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 drug 11/14/2018    Osteoarthritis of back     Psychiatric problem     Stroke 1972       Past Surgical History:   Procedure Laterality Date    BILATERAL OOPHORECTOMY  2015    CHOLECYSTECTOMY  11/09/2016    Done at [facility], path showed chronic cholecystitis and gallstones    cold knife conization  2014    COLONOSCOPY  2014    [facility]    COLONOSCOPY N/A 5/18/2018    Procedure: COLONOSCOPY;  Surgeon: Arden Gutiérrez MD;  Location: [facility] (4TH FLR);  Service: Endoscopy;  Laterality: N/A;    CYSTOSCOPY WITH URETEROSCOPY, RETROGRADE PYELOGRAPHY, AND INSERTION OF STENT Bilateral 3/21/2020    Procedure: CYSTOSCOPY, WITH RETROGRADE PYELOGRAM,;  Surgeon: Leslie Balbuena MD;  Location: [facility] OR 1ST FLR;  Service: Urology;  Laterality: Bilateral;    ESOPHAGOGASTRODUODENOSCOPY  2014    HYSTERECTOMY  2014    [facility] for cervical cancer    OOPHORECTOMY      RETROPERITONEAL LYMPHADENECTOMY Right 9/17/2018    Procedure: LYMPHADENECTOMY, RETROPERITONEUM;  Surgeon: Sebas Reed MD;  Location: [facility] OR 2ND FLR;  Service: General;  Laterality: Right;  ILIAC       Review of patient's allergies indicates:   Allergen Reactions    Bee sting [allergen ext-venom-honey bee]      Rash      Grass pollen-bermuda, standard      rash       Family History     Problem Relation (Age of Onset)    Breast cancer Maternal Aunt    Cancer Father, Mother    Cervical cancer Mother    Colon cancer Father    Drug abuse Daughter, Daughter    Esophageal cancer Father    Heart disease  Sister, Maternal Grandmother    Suicide Daughter          Tobacco Use    Smoking status: Former Smoker    Smokeless tobacco: Never Used   Substance and Sexual Activity    Alcohol use: No     Alcohol/week: 0.0 standard drinks    Drug use: No    Sexual activity: Yes     Partners: Male     Birth control/protection: None     Comment:  19 years since 1999       Review of Systems   Constitutional: Positive for appetite change. Negative for chills and fever.   HENT: Negative.    Eyes: Negative.    Respiratory: Negative for chest tightness and shortness of breath.    Cardiovascular: Negative for chest pain and palpitations.   Gastrointestinal: Positive for abdominal pain, nausea and vomiting. Negative for diarrhea.   Genitourinary: Negative for difficulty urinating, dysuria, flank pain, frequency and urgency.   Musculoskeletal: Negative for arthralgias and gait problem.   Skin: Negative for color change and rash.   Neurological: Negative for dizziness and headaches.   Psychiatric/Behavioral: Negative for agitation and confusion.       Objective:     Temp:  [98.2 °F (36.8 °C)-99.4 °F (37.4 °C)] 98.2 °F (36.8 °C)  Pulse:  [74-98] 74  Resp:  [16-18] 18  SpO2:  [96 %-98 %] 96 %  BP: (125-152)/(65-74) 152/70     Body mass index is 30.86 kg/m².           Drains     None                 Physical Exam   Nursing note and vitals reviewed.  Constitutional: She is oriented to person, place, and time. She appears well-developed and well-nourished. No distress.   HENT:   Head: Normocephalic and atraumatic.   Eyes: Pupils are equal, round, and reactive to light. Right eye exhibits no discharge. Left eye exhibits no discharge.   Cardiovascular: Normal rate.    Pulmonary/Chest: Effort normal. No respiratory distress.   Abdominal: Soft. She exhibits no distension. There is no tenderness.   No CVA tenderness   Genitourinary:   Genitourinary Comments: Right nephrostomy with clear yellow urine in bag, tube irrigates and aspirates  easily,  surrounding site clean/dry/intact  Left nephrostomy tube with clear yellow urine in bag, irrigates easily but difficulty with aspiration, surrounding site clean/dry/intact   Neurological: She is alert and oriented to person, place, and time.   Skin: Skin is warm and dry.     Psychiatric: She has a normal mood and affect. Her behavior is normal. Judgment and thought content normal.       Significant Labs:    BMP:  Recent Labs   Lab 06/10/20  1955 06/11/20  0325    144   K 3.6 4.1    106   CO2 28 29   BUN 13 10   CREATININE 1.4 1.2   CALCIUM 9.6 9.0       CBC:  Recent Labs   Lab 06/10/20  1955 06/11/20  0325   WBC 11.06 10.28   HGB 10.3* 10.0*   HCT 34.6* 32.9*   * 373*       All pertinent labs results from the past 24 hours have been reviewed.    Significant Imaging:  Per my read:  CT abdomen/pelvis with contrast (6/10/20):  - Bilateral adrenals unremarkable  - Right kidney with moderate hydronephrosis, right nephrostomy tube coil appears to be partially intraparenchymal, delayed nephrogram  - Right ureter normal in course and caliber with surrounding stranding  - Left kidney with mild hydronephrosis, left nephrostomy tube coil appears to be in the collecting system  - Left ureter normal in course and caliber with surrounding stranding  - Bladder unremarkable      Assessment and Plan:     * Hydronephrosis  Edita Riley is a 55 y.o. female who has a history of cervical cancer status post hysterectomy and chemo/XRT who has bilateral hydronephrosis managed by bilateral nephrostomy tubes with plans for colon conduit in the future. Urology was consulted due to bilateral hydronephrosis seen on CT.    - Patient does not have pyelonephritis as she is afebrile, has a normal WBC, and denies flank pain.  - Her UA is concerning for infection. However, this is likely asymptomatic bacteriuria which is expected with chronic indwelling nephrostomy tubes.  - Recommend bilateral nephrostomy  tube exchange with IR, upsize tubes if possible.  - Keep NPO and hold blood thinning medications. Last food and drink was last night.        VTE Risk Mitigation (From admission, onward)         Ordered     IP VTE HIGH RISK PATIENT  Once      06/11/20 0150     Place sequential compression device  Until discontinued      06/11/20 0150                Thank you for your consult.    Gokul Conway MD  Urology  [facility]

## 2020-06-11 NOTE — ASSESSMENT & PLAN NOTE
Pt with CKD3. Cr at 1.4 which appears to be her baseline. Hb at 10.3 which also appears to be at baseline.    -Repeat iron studies ordered

## 2020-06-11 NOTE — NURSING
0136am  Pt arrived on the unit.  AAOX4.  No SOB.  Following commands and cooperative.  Nephrostomy tubes emptied.  Site with dsng and intact.  Instructed to call staff for assistance.  Safety and fall precautions maintained and WCTM.

## 2020-06-11 NOTE — ED TRIAGE NOTES
"Edita Riley, a 55 y.o. female presents to the ED     Triage note:  Chief Complaint   Patient presents with    Abdominal Pain     Pt reports abdominal pain x1 day. Pt states "i cant eat grease .. and i ate grease, so my stomach is hurting .. its started around about 6 something." Pt reports recent kidney surgery. Pt has bilateral nephrostomy tubes.      Review of patient's allergies indicates:   Allergen Reactions    Bee sting [allergen ext-venom-honey bee]      Rash      Grass pollen-bermuda, standard      rash     Past Medical History:   Diagnosis Date    Anxiety     Cervical cancer 2014    Chronic back pain     Depression     Diarrhea due to malabsorption 11/14/2018    Fibromyalgia     GERD (gastroesophageal reflux disease)     Hiatal hernia 2014    History of cervical cancer 10/11/2018    Hx of psychiatric care     Cymbalta, trazodone    Hypertension     Hypomagnesemia 11/21/2018    Lactose intolerance     Metastatic squamous cell carcinoma to lymph node 10/11/2018    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 drug 11/14/2018    Osteoarthritis of back     Psychiatric problem     Stroke 1972       "

## 2020-06-11 NOTE — ED PROVIDER NOTES
"Encounter Date: 6/10/2020       History     Chief Complaint   Patient presents with    Abdominal Pain     Pt reports abdominal pain x1 day. Pt states "i cant eat grease .. and i ate grease, so my stomach is hurting .. its started around about 4 something." Pt reports recent kidney surgery. Pt has bilateral nephrostomy tubes.      HPI   Edita Riley is a 55 y.o. female with medical history of HTN, GERD, Depression, Hx of cervical cancer s/p radiation, BL ureteral obstruction with nephrostomy tubes presenting to the ED with the chief complaint of abdominal pain. Patient developed epigastric abdominal pain a few hours ago. She contributes the pain to an "upset stomach" after eating fried eggs and barbecued ribs. She experienced 3 episodes of emesis today. Last bowel movement was today and normal. She still produces urine normally and reports adequate drainage from her nephrostomy tubes. She denies fever. She has not taken any medications for her symptoms today. She is not on chemotherapy. She has a colonoscopy scheduled in the future for evaluation of ileal conduit as she is not tolerating the nephrostomy tubes.     Review of patient's allergies indicates:   Allergen Reactions    Bee sting [allergen ext-venom-honey bee]      Rash      Grass pollen-bermuda, standard      rash     Past Medical History:   Diagnosis Date    Anxiety     Cervical cancer 2014    Chronic back pain     Depression     Diarrhea due to malabsorption 11/14/2018    Fibromyalgia     GERD (gastroesophageal reflux disease)     Hiatal hernia 2014    History of cervical cancer 10/11/2018    Hx of psychiatric care     Cymbalta, trazodone    Hypertension     Hypomagnesemia 11/21/2018    Lactose intolerance     Metastatic squamous cell carcinoma to lymph node 10/11/2018    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 drug 11/14/2018    Osteoarthritis of back     Psychiatric problem     Stroke 1972     Past Surgical History: "   Procedure Laterality Date    BILATERAL OOPHORECTOMY  2015    CHOLECYSTECTOMY  11/09/2016    Done at [facility], path showed chronic cholecystitis and gallstones    cold knife conization  2014    COLONOSCOPY  2014    [facility]    COLONOSCOPY N/A 5/18/2018    Procedure: COLONOSCOPY;  Surgeon: Arden Gutiérrez MD;  Location: [facility] (4TH FLR);  Service: Endoscopy;  Laterality: N/A;    CYSTOSCOPY WITH URETEROSCOPY, RETROGRADE PYELOGRAPHY, AND INSERTION OF STENT Bilateral 3/21/2020    Procedure: CYSTOSCOPY, WITH RETROGRADE PYELOGRAM,;  Surgeon: Leslie Balbuena MD;  Location: [facility] OR 1ST FLR;  Service: Urology;  Laterality: Bilateral;    ESOPHAGOGASTRODUODENOSCOPY  2014    HYSTERECTOMY  2014    [facility] for cervical cancer    OOPHORECTOMY      RETROPERITONEAL LYMPHADENECTOMY Right 9/17/2018    Procedure: LYMPHADENECTOMY, RETROPERITONEUM;  Surgeon: Sebas Reed MD;  Location: [facility] OR 2ND FLR;  Service: General;  Laterality: Right;  ILIAC     Family History   Problem Relation Age of Onset    Heart disease Sister     Heart disease Maternal Grandmother     Colon cancer Father     Esophageal cancer Father     Cancer Father         Lung-smoker    Cancer Mother         Cervical    Cervical cancer Mother     Breast cancer Maternal Aunt     Suicide Daughter         jumped from parking structure    Drug abuse Daughter     Drug abuse Daughter     Rectal cancer Neg Hx     Stomach cancer Neg Hx     Crohn's disease Neg Hx     Ulcerative colitis Neg Hx     Diabetes Neg Hx     Hypertension Neg Hx      Social History     Tobacco Use    Smoking status: Former Smoker    Smokeless tobacco: Never Used   Substance Use Topics    Alcohol use: No     Alcohol/week: 0.0 standard drinks    Drug use: No     Review of Systems   Constitutional: Negative for chills, diaphoresis and fever.   HENT: Negative for sore throat.    Eyes: Negative for pain.   Respiratory: Negative  for shortness of breath.    Cardiovascular: Negative for chest pain.   Gastrointestinal: Positive for abdominal pain, nausea and vomiting. Negative for constipation and diarrhea.   Genitourinary: Negative for dysuria.   Musculoskeletal: Negative for back pain.   Skin: Negative for rash.   Neurological: Negative for weakness, light-headedness and headaches.   Hematological: Does not bruise/bleed easily.       Physical Exam     Initial Vitals [06/10/20 1921]   BP Pulse Resp Temp SpO2   129/74 98 18 99.4 °F (37.4 °C) 98 %      MAP       --         Physical Exam    Constitutional: She appears well-developed and well-nourished. She is not diaphoretic. No distress.   HENT:   Head: Normocephalic and atraumatic.   Mouth/Throat: Oropharynx is clear and moist. No oropharyngeal exudate.   Eyes: EOM are normal. Pupils are equal, round, and reactive to light.   Neck: Normal range of motion. Neck supple.   Cardiovascular: Normal rate and regular rhythm.   Pulmonary/Chest: Breath sounds normal. No respiratory distress. She has no wheezes.   Abdominal: Soft. There is no tenderness.   No CVA tenderness   Genitourinary:   Genitourinary Comments: BL nephrostomy tubes in place. Dressing c/d/i. Yellow urine in collecting bags.    Musculoskeletal: Normal range of motion. She exhibits no tenderness.   Neurological: She is alert and oriented to person, place, and time.   Skin: Skin is warm and dry. No rash noted. No erythema.       ED Course   Procedures  Labs Reviewed   CBC W/ AUTO DIFFERENTIAL - Abnormal; Notable for the following components:       Result Value    RBC 3.78 (*)     Hemoglobin 10.3 (*)     Hematocrit 34.6 (*)     Mean Corpuscular Hemoglobin Conc 29.8 (*)     Platelets 363 (*)     Gran # (ANC) 8.6 (*)     Gran% 77.3 (*)     Lymph% 12.4 (*)     All other components within normal limits   COMPREHENSIVE METABOLIC PANEL - Abnormal; Notable for the following components:    Glucose 118 (*)     eGFR if  48.1 (*)      eGFR if non  41.7 (*)     All other components within normal limits   URINALYSIS, REFLEX TO URINE CULTURE - Abnormal; Notable for the following components:    Appearance, UA Cloudy (*)     Protein, UA 3+ (*)     Occult Blood UA 1+ (*)     Leukocytes, UA 3+ (*)     All other components within normal limits    Narrative:     Preferred Collection Type->Urine, Clean Catch   URINALYSIS MICROSCOPIC - Abnormal; Notable for the following components:    RBC, UA 7 (*)     WBC, UA 97 (*)     Bacteria Many (*)     All other components within normal limits    Narrative:     Preferred Collection Type->Urine, Clean Catch   CULTURE, URINE   LIPASE          Imaging Results          CT Abdomen Pelvis With Contrast (Final result)  Result time 06/10/20 23:34:55    Final result by Fransisco Richards MD (06/10/20 23:34:55)                 Impression:      1.  Bilateral nephrostomy tubes in stable position with interval removal of double-J ureteral stents.  There is moderate right-sided and mild left-sided hydronephrosis, progressed compared to prior examination.  Additionally, there is prominent urothelial thickening and enhancement with associated inflammatory change concerning for ascending urinary tract infection/pyelonephritis.    2.  Status post hysterectomy in this patient with history of cervical cancer.  Limited assessment of the bladder secondary to nondistention.    3.  Small volume of free fluid within the pelvis, similar to prior examination.    4.  Additional incidental findings as above.      Electronically signed by: Fransisco Richards MD  Date:    06/10/2020  Time:    23:34             Narrative:    EXAMINATION:  CT ABDOMEN PELVIS WITH CONTRAST    CLINICAL HISTORY:  Infection, abdomen-pelvis;    TECHNIQUE:  Low dose axial images, sagittal and coronal reformations were obtained from the lung bases to the pubic symphysis following the IV administration of 100 mL of Omnipaque 350 .  Oral contrast was not  given.    COMPARISON:  CT 05/21/2020, 04/19/2020    FINDINGS:  The lung bases are unremarkable.  There is no pleural fluid present.  The visualized portions of the heart appear normal.    The liver is normal in size.  There is a stable appearing geographic region of parenchymal hypoattenuation adjacent to the falciform ligament that may relate to focal fatty infiltration.  The gallbladder is surgically absent.  There is no intra-or extrahepatic biliary ductal dilatation.    There is a small hiatal hernia.  The stomach otherwise appears within normal limits.  The spleen, pancreas, and adrenal glands are unremarkable.    There are bilateral nephrostomy tubes present.  Bilateral double-J ureteral stents have been intervally removed.  There is moderate right-sided hydronephrosis and mild left-sided hydronephrosis.  This appears mildly worsened compared to prior examination.  There is mild asymmetric diminished enhancement of the right kidney relative to the left.  Contrast is present within the collecting systems on the delayed phase, although not within the ureters.  There is prominent bilateral urothelial enhancement and thickening with periureteral and perinephric inflammatory change concerning for superimposed ascending urinary tract infection/pyelonephritis, particularly on the right.  There is a stable subcentimeter left renal hypodensity.  The urinary bladder is collapsed limiting assessment.  The patient is status post hysterectomy.  No discrete adnexal masses identified.  There are several small pelvic lymph nodes again demonstrated, similar to prior examination.  There is a small volume of free fluid in the pelvis.    The abdominal aorta is normal in course and caliber without significant atherosclerotic calcifications.    The visualized loops of small and large bowel show no evidence of obstruction or inflammation.  The appendix appears within normal limits.  There is no free intraperitoneal air or portal  "venous gas.    The osseous structures demonstrate mild degenerative changes.  The extraperitoneal soft tissues are unremarkable.                                 Medical Decision Making:   History:   Old Medical Records: I decided to obtain old medical records.  Old Records Summarized: records from clinic visits.  Clinical Tests:   Lab Tests: Ordered and Reviewed  Radiological Study: Ordered and Reviewed  Other:   I have discussed this case with another health care provider.       <> Summary of the Discussion: Urology, Hospital Medicine       APC / Resident Notes:   55 y.o. female with medical history of HTN, GERD, Depression, Hx of cervical cancer, BL ureteral obstruction with nephrostomy tubes presenting to the ED c/o abdominal pain with N/V that began a few hours ago. Contributes the pain to an "upset stomach" after eating barbecued ribs and fried eggs. DDx includes but not limited to gastroenteritis, GERD, pancreatitis, UTI, pyelonephritis, hernia, bowel obstruction.    8:47 PM - Hammad Roa PA-C  Continues to experience emesis after Zofran. Work-up additionally significant for UTI (clean catch specimen). Crt 1.4, GFR 48 which appears near baseline. Given significant medical history, will obtain CT abd/pelv for further evaluation of infection versus bowel obstruction.     CT abd/pelv with inflammatory changes concerning for pyelonephritis with progressed hydronephrosis (moderate right-sided and mild left-sided). Stable nephrostomy tubes positioning. Patient received Rocephin for treatment of pyelonephritis. Vancomycin additionally added for concerns of possible skin justin exposure to nephrostomy tubes. Discussed with Urology who agree with current plan. No emergent intervention at this time as nephrostomy tubes are adequately draining. Will see the patient inpatient if needed. Admitted to medicine for further management. Patient expresses understanding and agreeable to the plan. I have discussed the care of this " patient with my supervising physician.                            Clinical Impression:       ICD-10-CM ICD-9-CM   1. Pyelonephritis N12 590.80   2. Hydronephrosis, unspecified hydronephrosis type N13.30 591   3. Chest pain R07.9 786.50             ED Disposition Condition    Admit                           Hammad Roa PA-C  06/11/20 0248

## 2020-06-11 NOTE — NURSING
Pt arrived to floor VSS. Bilateral nephrostomy tube in place, draining freely, pink tinged urine noted. Pt reports pain 0/10 to bilateral sites. POC reviewed with patient.

## 2020-06-11 NOTE — PROGRESS NOTES
Pharmacokinetic Initial Assessment: IV Vancomycin    Assessment/Plan:    Initiate intravenous vancomycin with dose of 2000 mg every 24 hours.  Desired empiric serum trough concentration is 15 to 20 mcg/mL.  Draw vancomycin trough level 30 min prior to third dose on 06/13/2020 at approximately 0515.  Pharmacy will continue to follow and monitor vancomycin.      Please contact pharmacy at extension [phone] with any questions regarding this assessment.     Thank you for the consult,   [place]onpati       Patient brief summary:  Edita Riley is a 55 y.o. female initiated on antimicrobial therapy with IV Vancomycin for treatment of suspected skin & soft tissue infection.    Drug Allergies:   Review of patient's allergies indicates:   Allergen Reactions    Bee sting [allergen ext-venom-honey bee]      Rash      Grass pollen-bermuda, standard      rash       Actual Body Weight:   94.8 kg    Renal Function:   Estimated Creatinine Clearance: 54.3 mL/min (based on SCr of 1.4 mg/dL).,     Dialysis Method (if applicable):  N/A    CBC (last 72 hours):  Recent Labs   Lab Result Units 06/10/20  1955 06/11/20  0325   WBC K/uL 11.06 10.28   Hemoglobin g/dL 10.3* 10.0*   Hematocrit % 34.6* 32.9*   Platelets K/uL 363* 373*   Gran% % 77.3* 73.7*   Lymph% % 12.4* 13.9*   Mono% % 8.0 10.2   Eosinophil% % 1.7 1.7   Basophil% % 0.3 0.3   Differential Method  Automated Automated       Metabolic Panel (last 72 hours):  Recent Labs   Lab Result Units 06/10/20  1955   Sodium mmol/L 140   Potassium mmol/L 3.6   Chloride mmol/L 101   CO2 mmol/L 28   Glucose mg/dL 118*   Glucose, UA  Negative   BUN, Bld mg/dL 13   Creatinine mg/dL 1.4   Albumin g/dL 3.5   Total Bilirubin mg/dL 0.4   Alkaline Phosphatase U/L 112   AST U/L 20   ALT U/L 19       Drug levels (last 3 results):  No results for input(s): VANCOMYCINRA, VANCOMYCINPE, VANCOMYCINTR in the last 72 hours.    Microbiologic Results:  Microbiology Results (last 7 days)      Procedure Component Value Units Date/Time    Gram Stain [[id number]]     Order Status:  No result Specimen:  Urine     Blood culture #2 **CANNOT BE ORDERED STAT** [[id number]] Collected:  06/11/20 0100    Order Status:  Sent Specimen:  Blood from Peripheral, Wrist, Right Updated:  06/11/20 0146    Blood culture #1 **CANNOT BE ORDERED STAT** [[id number]] Collected:  06/11/20 0100    Order Status:  Sent Specimen:  Blood from Peripheral, Wrist, Right Updated:  06/11/20 0145    Urine culture [[id number]] Collected:  06/10/20 1955    Order Status:  No result Specimen:  Urine Updated:  06/10/20 2034

## 2020-06-11 NOTE — PLAN OF CARE
POC reviewed with pt, pt verbalized understanding. Safety maintained throughout shift, bed locked and in lowest position, call light in reach, Side rails up X 2. Non skid sock on when OOB. Pt remained free of fall/trauma. Pt self reports of pain to bilateral nephrostomy sites  treated with PO pain medication as ordered. VSS, pt remained afebrile this shift. Bilateral nephrostomy exchange done on today, pink tinged urine to bilateral sites, draining freely. Pt tolerating meals well, no reports of nausea and vomiting. Will continue to monitor.

## 2020-06-11 NOTE — H&P
Inpatient Radiology Pre-procedure Note    History of Present Illness:    Edita Riley is a 55 y.o. female with a history of cervical cancer status post hysterectomy and chemo/XRT who has bilateral hydronephrosis managed by bilateral nephrostomy tubes. New bilat hydro on most recent CT. IR consulted for bilateral neph tube exchange and upsize.    Admission H&P reviewed.  Past Medical History:   Diagnosis Date    Anxiety     Cervical cancer 2014    Chronic back pain     Depression     Diarrhea due to malabsorption 11/14/2018    Fibromyalgia     GERD (gastroesophageal reflux disease)     Hiatal hernia 2014    History of cervical cancer 10/11/2018    Hx of psychiatric care     Cymbalta, trazodone    Hypertension     Hypomagnesemia 11/21/2018    Lactose intolerance     Metastatic squamous cell carcinoma to lymph node 10/11/2018    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 drug 11/14/2018    Osteoarthritis of back     Psychiatric problem     Stroke 1972     Past Surgical History:   Procedure Laterality Date    BILATERAL OOPHORECTOMY  2015    CHOLECYSTECTOMY  11/09/2016    Done at [facility], path showed chronic cholecystitis and gallstones    cold knife conization  2014    COLONOSCOPY  2014    [facility]    COLONOSCOPY N/A 5/18/2018    Procedure: COLONOSCOPY;  Surgeon: Arden Gutiérrez MD;  Location: [facility] ([address] [place] [address]);  Service: Endoscopy;  Laterality: N/A;    CYSTOSCOPY WITH URETEROSCOPY, RETROGRADE PYELOGRAPHY, AND INSERTION OF STENT Bilateral 3/21/2020    Procedure: CYSTOSCOPY, WITH RETROGRADE PYELOGRAM,;  Surgeon: Leslie Balbuena MD;  Location: [facility] OR [address] [place] [address];  Service: Urology;  Laterality: Bilateral;    ESOPHAGOGASTRODUODENOSCOPY  2014    HYSTERECTOMY  2014    [facility] for cervical cancer    OOPHORECTOMY      RETROPERITONEAL LYMPHADENECTOMY Right 9/17/2018    Procedure: LYMPHADENECTOMY, RETROPERITONEUM;  Surgeon: Sebas Reed MD;   Location: [facility] OR [address] [place] [address];  Service: General;  Laterality: Right;  ILIAC       Review of Systems:   As documented in primary team H&P    Home Meds:   Prior to Admission medications    Medication Sig Start Date End Date Taking? Authorizing Provider   amLODIPine (NORVASC) 5 MG tablet Take 5 mg by mouth. 4/30/20 4/30/21  Historical Provider, MD   cephALEXin (KEFLEX) 500 MG capsule Take 500 mg by mouth. 4/30/20   Historical Provider, MD   docusate sodium (COLACE) 100 MG capsule Take 1 capsule (100 mg total) by mouth 2 (two) times daily as needed for Constipation. 3/28/20   Cynthia Díaz MD   gabapentin (NEURONTIN) 300 MG capsule Take 1 capsule (300 mg total) by mouth 3 (three) times daily. 9/3/19   Refugio Benjamin MD   lisinopril 10 MG tablet Take 1 tablet (10 mg total) by mouth once daily. 9/19/19 9/18/20  Audrey Mustafa MD   omeprazole (PRILOSEC) 40 MG capsule Take 1 capsule (40 mg total) by mouth once daily. 2/7/20 2/6/21  Audrey Mustafa MD   traZODone (DESYREL) 50 MG tablet TAKE 1 TABLET (50 MG TOTAL) BY MOUTH EVERY EVENING. 11/13/19 11/12/20  Audrey Mustafa MD     Scheduled Meds:    cefTRIAXone (ROCEPHIN) IVPB  2 g Intravenous Q24H    pantoprazole  40 mg Oral Daily    vancomycin (VANCOCIN) IVPB  20 mg/kg Intravenous Q24H     Continuous Infusions:   PRN Meds:Dextrose 10% Bolus, Dextrose 10% Bolus, glucagon (human recombinant), glucose, glucose, ondansetron, oxyCODONE, promethazine, sodium chloride 0.9%  Anticoagulants/Antiplatelets: no anticoagulation    Allergies:   Review of patient's allergies indicates:   Allergen Reactions    Bee sting [allergen ext-venom-honey bee]      Rash      Grass pollen-bermuda, standard      rash     Sedation Hx: have not been any systemic reactions    Labs:  Recent Labs   Lab 06/11/20  0711   INR 1.0       Recent Labs   Lab 06/11/20  0325   WBC 10.28   HGB 10.0*   HCT 32.9*   MCV 91   *      Recent Labs   Lab 06/11/20  0325   GLU  128*      K 4.1      CO2 29   BUN 10   CREATININE 1.2   CALCIUM 9.0   MG 2.0   ALT 16   AST 18   ALBUMIN 2.9*   BILITOT 0.2         Vitals:  Temp: 98 °F (36.7 °C) (06/11/20 0736)  Pulse: 71 (06/11/20 0736)  Resp: 18 (06/11/20 0736)  BP: 129/68 (06/11/20 0736)  SpO2: 98 % (06/11/20 0736)     Physical Exam:  ASA: 2  Mallampati: 2    General: no acute distress  Mental Status: alert and oriented to person, place and time  HEENT: normocephalic, atraumatic  Chest: unlabored breathing  Heart: regular heart rate  Abdomen: bilat neph tubes in place, nondistended  Extremity: moves all extremities    Plan: Plan for bilat neph tube exchange and upsize as per urology request    Sedation Plan: Moderate    Brock King MD  Radiology, PGY - IV

## 2020-06-11 NOTE — ASSESSMENT & PLAN NOTE
Edita Riley is a 55 y.o. female who has a history of cervical cancer status post hysterectomy and chemo/XRT who has bilateral hydronephrosis managed by bilateral nephrostomy tubes with plans for colon conduit in the future. Urology was consulted due to bilateral hydronephrosis seen on CT.    - Patient does not have pyelonephritis as she is afebrile, has a normal WBC, and denies flank pain.  - Her UA is concerning for infection. However, this is likely asymptomatic bacteriuria which is expected with chronic indwelling nephrostomy tubes.  - Recommend bilateral nephrostomy tube exchange with IR, upsize tubes if possible.  - Keep NPO and hold blood thinning medications. Last food and drink was last night.

## 2020-06-11 NOTE — HPI
Edita Riley is a 55 y.o. female who has a history of cervical cancer status post hysterectomy and chemo/XRT. After she was found to have right sided hydronephrosis, she underwent R ureteral stent placement on 3/23/20; also underwent L ureteral stent placement then as a prophylactic measure (to avoid having patient need to return during COVID19 pandemic.) She later was admitted to [facility] with renal failure and worsening bilateral hydronephrosis, so she had bilateral nephrostomy tubes placed on 4/30/20. Ureteral stents were removed in clinic on 6/1/20. She is a patient of Dr. Balbuena's and has plans to have urinary diversion with a colon conduit eventually. She was admitted to [facility] to Hospital Medicine on 6/10 for nausea, vomiting, and abdominal pain after eating greasy food which have both improved. She denies fevers, chills, suprapubic pain, flank pain, frequency, or urgency. She states that she has had equal urine output from both nephrostomy tubes. CT a/p with contrast shows moderate right hydronephrosis and mild left hydronephrosis. Her right nephrostomy tube coil appears to be partially intraparenchymal. She is afebrile and her vitals are stable.Her WBC is normal and her Cr is at her baseline. Clean catch UA was nitrite negative and showed 7 RBC/hpf, 97 WBC/hpf, and many bacteria.

## 2020-06-11 NOTE — PLAN OF CARE
CM met with patient  in room 923 for Discharge Planning Assessment.  Per patient, she  lives with spouse in a single story apartment on the ground level  with NO steps to enter.   Per patient, she  was independent with ADLS and usea a rolling standard walker intermittently for ambulation needs.   Per  the patient she  will have assistance from her spouse and daughter upon discharge.  Discharge Planning Booklet given to patient/family and discussed.  All questions addressed.         PCP:  Audrey Mustafa MD    Pharmacy:  [facility]/pharmacy #[id number] - [place] - [address]  [address]  [place] [address]  Phone: [phone] Fax: [phone]    [facility]/pharmacy #[id number] - [place] - [address]  [address]  [place] [address]  Phone: [phone] Fax: [phone]    Emergency Contacts:  Extended Emergency Contact Information  Primary Emergency Contact: Hammad Riley  Address: [address] [place] [address]  Home Phone: [phone]  Relation: Spouse    Insurance:  Payor: MEDICAID / Plan: [employer] [facility]) / Product Type: Managed Medicaid /       Akanksha Santizo RN  Case Management  Ext: [phone]  06/11/2020  11:37 AM        06/11/20 1132   Discharge Assessment   Assessment Type Discharge Planning Assessment   Confirmed/corrected address and phone number on facesheet? Yes   Assessment information obtained from? Patient   Expected Length of Stay (days) 4   Communicated expected length of stay with patient/caregiver yes   Prior to hospitilization cognitive status: Alert/Oriented;No Deficits   Prior to hospitalization functional status: Independent   Current cognitive status: Alert/Oriented;No Deficits   Current Functional Status: Independent   Facility Arrived From: HOME throught the ER   Lives With spouse   Able to Return to Prior Arrangements yes   Is patient able to care for self after discharge? Yes   Who  are your caregiver(s) and their phone number(s)? Hammad Riley Spouse [phone]      Patient's perception of discharge disposition home or selfcare   Readmission Within the Last 30 Days no previous admission in last 30 days   Patient currently receives any other outside agency services? No   Equipment Currently Used at Home walker, rolling   Do you have any problems affording any of your prescribed medications? No   Is the patient taking medications as prescribed? yes   Does the patient have transportation home? Yes   Transportation Anticipated family or friend will provide   Does the patient receive services at the [facility]? No   Discharge Plan A Home with family   Discharge Plan B Home with family   DME Needed Upon Discharge  walker, rolling   Patient/Family in Agreement with Plan yes

## 2020-06-11 NOTE — HPI
Ms. Riley is a 55yF with history of cervical cancer s/p chemo and radiation with radiation cystitis and bilateral nephrostomy tubes (placed April 2020 at [facility]) who presents to ED with acute onset vomiting and abdominal pain after eating fried eggs and BBQ ribs. States that she does not typically eat these kinds of heavy, greasy foods. States she was feeling fine prior to this episode. Recently saw her Urologist in clinic who is planning on colon conduit surgery in future to remove nephrostomy tubes. Denies fevers/chills/weakness. Denies any redness/pain around nephrostomy tube sites, no increased output. Still makes urine but denies any dysuria or hematuria. Some mild increased frequency but no urgency. No recent sick contacts. No diarrhea. No one else eating the foods had similar symptoms. She does have hx of multiple prior UTIs and was recently taking Cephalexin at home for UTI ppx. Denies illicit drug-use or cigarette use. No alcohol use. Her other medical history is notable for HTN, GERD, Fibromyalgia, and Anemia of Chronic Disease. Home meds include Lisinopril, Amlodipine, Oxycodone prn and PPI prn.     On arrival to the ED she was afebrile and HDS with BP of 129/74, HR 98, RR 18 and satting 98% on room air. UA positive for 3+ protein, 1+ blood, 3+ leukocytes, 97 WBC, and many bacteria. WBC was within normal limits. Lipase at 14. She was given 1L NS as well as started on Vanc and Ceftriaxone.

## 2020-06-11 NOTE — CONSULTS
Radiology Consult    Edita Riley is a 55 y.o. female with a history of cervical cancer status post hysterectomy and chemo/XRT who has bilateral hydronephrosis managed by bilateral nephrostomy tubes. New bilat hydro on most recent CT. IR consulted for bilateral neph tube exchange and upsize.    Past Medical History:   Diagnosis Date    Anxiety     Cervical cancer 2014    Chronic back pain     Depression     Diarrhea due to malabsorption 11/14/2018    Fibromyalgia     GERD (gastroesophageal reflux disease)     Hiatal hernia 2014    History of cervical cancer 10/11/2018    Hx of psychiatric care     Cymbalta, trazodone    Hypertension     Hypomagnesemia 11/21/2018    Lactose intolerance     Metastatic squamous cell carcinoma to lymph node 10/11/2018    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 drug 11/14/2018    Osteoarthritis of back     Psychiatric problem     Stroke 1972     Past Surgical History:   Procedure Laterality Date    BILATERAL OOPHORECTOMY  2015    CHOLECYSTECTOMY  11/09/2016    Done at [facility], path showed chronic cholecystitis and gallstones    cold knife conization  2014    COLONOSCOPY  2014    [facility]    COLONOSCOPY N/A 5/18/2018    Procedure: COLONOSCOPY;  Surgeon: Arden Gutiérrez MD;  Location: [facility] (4TH FLR);  Service: Endoscopy;  Laterality: N/A;    CYSTOSCOPY WITH URETEROSCOPY, RETROGRADE PYELOGRAPHY, AND INSERTION OF STENT Bilateral 3/21/2020    Procedure: CYSTOSCOPY, WITH RETROGRADE PYELOGRAM,;  Surgeon: Leslie Balbuena MD;  Location: [facility] OR 1ST FLR;  Service: Urology;  Laterality: Bilateral;    ESOPHAGOGASTRODUODENOSCOPY  2014    HYSTERECTOMY  2014    [facility] for cervical cancer    OOPHORECTOMY      RETROPERITONEAL LYMPHADENECTOMY Right 9/17/2018    Procedure: LYMPHADENECTOMY, RETROPERITONEUM;  Surgeon: Sebas Reed MD;  Location: [facility] OR 2ND FLR;  Service: General;  Laterality: Right;  ILIAC        Discussed with primary team.    Imaging reviewed with Radiology staff.     Scheduled Meds:    cefTRIAXone (ROCEPHIN) IVPB  2 g Intravenous Q24H    pantoprazole  40 mg Oral Daily    vancomycin (VANCOCIN) IVPB  20 mg/kg Intravenous Q24H     Continuous Infusions:   PRN Meds:Dextrose 10% Bolus, Dextrose 10% Bolus, glucagon (human recombinant), glucose, glucose, ondansetron, oxyCODONE, promethazine, sodium chloride 0.9%    Allergies:   Review of patient's allergies indicates:   Allergen Reactions    Bee sting [allergen ext-venom-honey bee]      Rash      Grass pollen-bermuda, standard      rash       Labs:  Recent Labs   Lab 06/11/20 0711   INR 1.0       Recent Labs   Lab 06/11/20 0325   WBC 10.28   HGB 10.0*   HCT 32.9*   MCV 91   *      Recent Labs   Lab 06/11/20 0325   *      K 4.1      CO2 29   BUN 10   CREATININE 1.2   CALCIUM 9.0   MG 2.0   ALT 16   AST 18   ALBUMIN 2.9*   BILITOT 0.2         Vitals (Most Recent):  Temp: 98 °F (36.7 °C) (06/11/20 0736)  Pulse: 71 (06/11/20 0736)  Resp: 18 (06/11/20 0736)  BP: 129/68 (06/11/20 0736)  SpO2: 98 % (06/11/20 0736)    Plan:   1. NPO.  2. Hold anticoagulants.  3. Plan for bilat neph tube excahnge today.      Brock King MD  Radiology, PGY - IV

## 2020-06-11 NOTE — PROGRESS NOTES
Therapy with Vancomycin complete and/or consult discontinued by provider.  Pharmacy will sign off, please re-consult as needed.     Thank you for the consult,   Sarah Tovar  [phone]

## 2020-06-11 NOTE — PROGRESS NOTES
Recovery complete. Site CDI, no bleeding no hematoma. VSS. Site care instructions provided, questions answered. Pt returned to room via transport.

## 2020-06-11 NOTE — PROGRESS NOTES
Pt arrived to ROCU bed 5 for post procedure recover. Pt oriented to unit and staff.  Stretcher locked and placed in lowest position. Calming environment promoted. Comfort measures provided. Pt resting comfortably. Dressing CDI. VSS. No acute events. See flow sheets for post procedure monitoring. Pt denies pain/discomfort.  Will continue to monitor.

## 2020-06-11 NOTE — PLAN OF CARE
CM met with patient in room 923 for admission discharge assessment. Patient has no needs. Anticipate DC home with spouse. Has resources and transportation available. Continue to monitor    Akanksha Santizo RN  Case Management  Ext [phone]     06/11/20 1130   Post-Acute Status   Post-Acute Authorization Other   Other Status No Post-Acute Service Needs   Discharge Delays None known at this time   Discharge Plan   Discharge Plan A Home with family   Discharge Plan B Home with family

## 2020-06-12 VITALS
HEIGHT: 69 IN | HEART RATE: 78 BPM | SYSTOLIC BLOOD PRESSURE: 121 MMHG | BODY MASS INDEX: 30.96 KG/M2 | WEIGHT: 209 LBS | TEMPERATURE: 99 F | RESPIRATION RATE: 18 BRPM | DIASTOLIC BLOOD PRESSURE: 66 MMHG | OXYGEN SATURATION: 95 %

## 2020-06-12 LAB
ALBUMIN SERPL BCP-MCNC: 2.8 G/DL (ref 3.5–5.2)
ALP SERPL-CCNC: 109 U/L (ref 55–135)
ALT SERPL W/O P-5'-P-CCNC: 16 U/L (ref 10–44)
ANION GAP SERPL CALC-SCNC: 10 MMOL/L (ref 8–16)
AST SERPL-CCNC: 19 U/L (ref 10–40)
BASOPHILS # BLD AUTO: 0.03 K/UL (ref 0–0.2)
BASOPHILS NFR BLD: 0.4 % (ref 0–1.9)
BILIRUB SERPL-MCNC: 0.3 MG/DL (ref 0.1–1)
BUN SERPL-MCNC: 11 MG/DL (ref 6–20)
CALCIUM SERPL-MCNC: 9 MG/DL (ref 8.7–10.5)
CHLORIDE SERPL-SCNC: 103 MMOL/L (ref 95–110)
CO2 SERPL-SCNC: 26 MMOL/L (ref 23–29)
CREAT SERPL-MCNC: 1.2 MG/DL (ref 0.5–1.4)
DIFFERENTIAL METHOD: ABNORMAL
EOSINOPHIL # BLD AUTO: 0.2 K/UL (ref 0–0.5)
EOSINOPHIL NFR BLD: 2.7 % (ref 0–8)
ERYTHROCYTE [DISTWIDTH] IN BLOOD BY AUTOMATED COUNT: 13.9 % (ref 11.5–14.5)
EST. GFR  (AFRICAN AMERICAN): 58 ML/MIN/1.73 M^2
EST. GFR  (NON AFRICAN AMERICAN): 50.3 ML/MIN/1.73 M^2
FERRITIN SERPL-MCNC: 190 NG/ML (ref 20–300)
GLUCOSE SERPL-MCNC: 102 MG/DL (ref 70–110)
HCT VFR BLD AUTO: 33.3 % (ref 37–48.5)
HGB BLD-MCNC: 9.9 G/DL (ref 12–16)
IMM GRANULOCYTES # BLD AUTO: 0.02 K/UL (ref 0–0.04)
IMM GRANULOCYTES NFR BLD AUTO: 0.2 % (ref 0–0.5)
INR PPP: 1 (ref 0.8–1.2)
IRON SERPL-MCNC: 19 UG/DL (ref 30–160)
LYMPHOCYTES # BLD AUTO: 1.3 K/UL (ref 1–4.8)
LYMPHOCYTES NFR BLD: 16.5 % (ref 18–48)
MAGNESIUM SERPL-MCNC: 1.8 MG/DL (ref 1.6–2.6)
MCH RBC QN AUTO: 27.1 PG (ref 27–31)
MCHC RBC AUTO-ENTMCNC: 29.7 G/DL (ref 32–36)
MCV RBC AUTO: 91 FL (ref 82–98)
MONOCYTES # BLD AUTO: 1 K/UL (ref 0.3–1)
MONOCYTES NFR BLD: 12.2 % (ref 4–15)
NEUTROPHILS # BLD AUTO: 5.5 K/UL (ref 1.8–7.7)
NEUTROPHILS NFR BLD: 68 % (ref 38–73)
NRBC BLD-RTO: 0 /100 WBC
PHOSPHATE SERPL-MCNC: 4.1 MG/DL (ref 2.7–4.5)
PLATELET # BLD AUTO: 351 K/UL (ref 150–350)
PMV BLD AUTO: 10.2 FL (ref 9.2–12.9)
POTASSIUM SERPL-SCNC: 3.4 MMOL/L (ref 3.5–5.1)
PROT SERPL-MCNC: 6.9 G/DL (ref 6–8.4)
PROTHROMBIN TIME: 10.2 SEC (ref 9–12.5)
RBC # BLD AUTO: 3.65 M/UL (ref 4–5.4)
SATURATED IRON: 8 % (ref 20–50)
SODIUM SERPL-SCNC: 139 MMOL/L (ref 136–145)
TOTAL IRON BINDING CAPACITY: 243 UG/DL (ref 250–450)
TRANSFERRIN SERPL-MCNC: 164 MG/DL (ref 200–375)
WBC # BLD AUTO: 8.12 K/UL (ref 3.9–12.7)

## 2020-06-12 PROCEDURE — 25000003 PHARM REV CODE 250: Performed by: STUDENT IN AN ORGANIZED HEALTH CARE EDUCATION/TRAINING PROGRAM

## 2020-06-12 PROCEDURE — 99239 HOSP IP/OBS DSCHRG MGMT >30: CPT | Mod: ,,, | Performed by: INTERNAL MEDICINE

## 2020-06-12 PROCEDURE — 36415 COLL VENOUS BLD VENIPUNCTURE: CPT

## 2020-06-12 PROCEDURE — 83735 ASSAY OF MAGNESIUM: CPT

## 2020-06-12 PROCEDURE — 83540 ASSAY OF IRON: CPT

## 2020-06-12 PROCEDURE — 82728 ASSAY OF FERRITIN: CPT

## 2020-06-12 PROCEDURE — 84100 ASSAY OF PHOSPHORUS: CPT

## 2020-06-12 PROCEDURE — 85025 COMPLETE CBC W/AUTO DIFF WBC: CPT

## 2020-06-12 PROCEDURE — 85610 PROTHROMBIN TIME: CPT

## 2020-06-12 PROCEDURE — 80053 COMPREHEN METABOLIC PANEL: CPT

## 2020-06-12 PROCEDURE — 99239 PR HOSPITAL DISCHARGE DAY,>30 MIN: ICD-10-PCS | Mod: ,,, | Performed by: INTERNAL MEDICINE

## 2020-06-12 RX ORDER — OXYCODONE HYDROCHLORIDE 5 MG/1
5 TABLET ORAL EVERY 8 HOURS PRN
Qty: 10 TABLET | Refills: 0 | Status: ON HOLD | OUTPATIENT
Start: 2020-06-12 | End: 2020-12-03 | Stop reason: HOSPADM

## 2020-06-12 RX ADMIN — POTASSIUM BICARBONATE 50 MEQ: 978 TABLET, EFFERVESCENT ORAL at 09:06

## 2020-06-12 RX ADMIN — OXYCODONE HYDROCHLORIDE 5 MG: 5 TABLET ORAL at 05:06

## 2020-06-12 RX ADMIN — PANTOPRAZOLE SODIUM 40 MG: 40 TABLET, DELAYED RELEASE ORAL at 09:06

## 2020-06-12 RX ADMIN — OXYCODONE HYDROCHLORIDE 5 MG: 5 TABLET ORAL at 09:06

## 2020-06-12 NOTE — PLAN OF CARE
Patient medically ready for discharge to home.  This CM scheduled or requested necessary follow-up appointments.     Future Appointments   Date Time Provider Department Center   7/13/2020  2:00 PM Hammad Reynolds MD [facility] COLON [facility]   8/25/2020  9:30 AM [facility] OIC-M[facility]   8/25/2020 11:00 AM Refugio Lemon MD [facility] GYN ONC [facility]   9/15/2020 10:40 AM Nadia Araiza MD [facility] PHYSMED [facility]       Akanksha Santizo, AMY  Case Management  Ext: [phone]  06/12/2020  11:29 AM       06/12/20 1128   Final Note   Assessment Type Final Discharge Note   Anticipated Discharge Disposition Home   What phone number can be called within the next 1-3 days to see how you are doing after discharge? [phone]   Hospital Follow Up  Appt(s) scheduled? No  (Message to clinics to schedule and notify patient )

## 2020-06-12 NOTE — HOSPITAL COURSE
Patient was admitted for increasing bilateral hydronephrosis on imaging in the setting of intractable n/v. Was admitted to hospital medicine for concerns for pyelonephritis- antibiotics initiated in the Emergency Department. Urology was consulted and recommending Interventional Radiology consult with plans to exchange and upsize bilateral nephrostomy tubes d/t concerning findings on imaging. Discontinued antibiotics as patient was afebrile and without a white count/left shift. Interventional completed the exchange and replaced bilateral nephrostomy tubes on 6/11. Patient tolerated the procedure well with some post- procedure pain- which was controlled with prn pain medications. Patient with good urine output and appetite on 6/12- stable for DC home without any barriers. 10 count oxy prescription provided. Will follow up at [place] with Dr. Reynolds ([facility]) for evaluation for conduit.

## 2020-06-12 NOTE — PLAN OF CARE
06/12/20 1035   Post-Acute Status   Post-Acute Authorization Other   Other Status No Post-Acute Service Needs       No SW needs noted upon D/C.  SW in contact with CM and Medical staff. Will continue to follow and offer support as needed.     Anish Blandon, DELPHINE  [facility]   Ext. [phone]

## 2020-06-12 NOTE — PLAN OF CARE
Problem: Adult Inpatient Plan of Care  Goal: Plan of Care Review  Outcome: Ongoing, Progressing     Problem: Electrolyte Imbalance (Acute Kidney Injury/Impairment)  Goal: Serum Electrolyte Balance  Outcome: Ongoing, Progressing     Problem: Fall Injury Risk  Goal: Absence of Fall and Fall-Related Injury  Outcome: Ongoing, Progressing    POC reviewed with pt at bedside.  Verbalized understanding.  Questions answered.  C/o generalized pain.  Pain  managed by prn pain meds.  Nephrostomy tubes intact and patent.  Tubes drained.  Site with dsng.  Dressings dry and intact.  Instructed on ss of infection and what to watch for.  No acute event.  Safety and fall precautions maintained.  Call light within reach and WCTM.

## 2020-06-12 NOTE — DISCHARGE SUMMARY
[facility] Hospital Medicine  Discharge Summary      Patient Name: Edita Riley  MRN: [id number]  Admission Date: 6/10/2020  Hospital Length of Stay: 1 days  Discharge Date and Time:  06/12/2020 4:20 PM  Attending Physician: Kacey Bergeron MD   Discharging Provider: Eddie Bashir DO  Primary Care Provider: Audrey Mustafa MD      HPI:   Ms. Riley is a 55yF with history of cervical cancer s/p chemo and radiation with radiation cystitis and bilateral nephrostomy tubes (placed April 2020 at [facility]) who presents to ED with acute onset vomiting and abdominal pain after eating fried eggs and BBQ ribs. States that she does not typically eat these kinds of heavy, greasy foods. States she was feeling fine prior to this episode. Recently saw her Urologist in clinic who is planning on colon conduit surgery in future to remove nephrostomy tubes. Denies fevers/chills/weakness. Denies any redness/pain around nephrostomy tube sites, no increased output. Still makes urine but denies any dysuria or hematuria. Some mild increased frequency but no urgency. No recent sick contacts. No diarrhea. No one else eating the foods had similar symptoms. She does have hx of multiple prior UTIs and was recently taking Cephalexin at home for UTI ppx. Denies illicit drug-use or cigarette use. No alcohol use. Her other medical history is notable for HTN, GERD, Fibromyalgia, and Anemia of Chronic Disease. Home meds include Lisinopril, Amlodipine, Oxycodone prn and PPI prn.     On arrival to the ED she was afebrile and HDS with BP of 129/74, HR 98, RR 18 and satting 98% on room air. UA positive for 3+ protein, 1+ blood, 3+ leukocytes, 97 WBC, and many bacteria. WBC was within normal limits. Lipase at 14. She was given 1L NS as well as started on Vanc and Ceftriaxone.     * No surgery found *      Hospital Course:   Patient was admitted for increasing bilateral hydronephrosis on imaging in the setting of  intractable n/v. Was admitted to hospital medicine for concerns for pyelonephritis- antibiotics initiated in the Emergency Department. Urology was consulted and recommending Interventional Radiology consult with plans to exchange and upsize bilateral nephrostomy tubes d/t concerning findings on imaging. Discontinued antibiotics as patient was afebrile and without a white count/left shift. Interventional completed the exchange and replaced bilateral nephrostomy tubes on 6/11. Patient tolerated the procedure well with some post- procedure pain- which was controlled with prn pain medications. Patient with good urine output and appetite on 6/12- stable for DC home without any barriers. 10 count oxy prescription provided. [place] follow up at [place]. Reynolds ([facility]) for evaluation for conduit.      Review of Systems   Constitutional: Negative for chills and fever.   HENT: Negative for congestion and trouble swallowing.    Eyes: Negative for visual disturbance.   Respiratory: Negative for cough, shortness of breath and wheezing.    Cardiovascular: Negative for chest pain, palpitations and leg swelling.   Gastrointestinal: Negative for n/v. Negative for diarrhea.   Genitourinary: Negative for dysuria, flank pain and hematuria.   Musculoskeletal: Negative for back pain.   Skin: Negative for rash and wound.   Neurological: Negative for dizziness, tremors and light-headedness.   Psychiatric/Behavioral: Negative for confusion.      Objective:      Vitals:    06/12/20 1609   BP: 121/66   Pulse: 78   Resp: 18   Temp: 98.6 °F (37 °C)     Physical Exam   Constitutional: She is oriented to person, place, and time. She appears well-developed and well-nourished. No distress.   HENT:   Head: Normocephalic.   Eyes: EOM are normal. No scleral icterus.   Neck: Normal range of motion. Neck supple.   Cardiovascular: Normal rate.   Murmur heard.  Pulmonary/Chest: Effort normal and breath sounds normal. No respiratory distress. She has no  wheezes.   Abdominal: Soft. She exhibits no distension. There is no tenderness. There is no guarding.   Musculoskeletal: Normal range of motion. She exhibits no edema.   Bilateral nephrostomy tubes- draining appropriately.  No noted tenderness, erythema, drainage, purulence of either site. Draining yellow urine in both bags.   No CVA tenderness bilaterally.   Neurological: She is alert and oriented to person, place, and time.   Skin: Skin is warm and dry. No rash noted. She is not diaphoretic. No erythema.   Psychiatric: She has a normal mood and affect.   Nursing note and vitals reviewed.       Consults:   Consults (From admission, onward)        Status Ordering Provider     Inpatient consult to Interventional Radiology  Once     Provider:  (Not yet assigned)    Completed KAROLINA CURRY     Inpatient consult to Urology  Once     Provider:  (Not yet assigned)    Completed BLAZE HATFIELD          No new Assessment & Plan notes have been filed under this hospital service since the last note was generated.  Service: Hospital Medicine    Final Active Diagnoses:    Diagnosis Date Noted POA    PRINCIPAL PROBLEM:  Hydronephrosis [N13.30] 06/11/2020 Yes    Pyelonephritis [N12] 06/11/2020 Yes    Urinary tract infection associated with nephrostomy catheter [T83.512A, N39.0] 06/11/2020 Yes    Anemia due to chronic kidney disease [N18.9, D63.1] 05/18/2020 Yes    Hydronephrosis of right kidney [N13.30] 03/21/2020 Yes    History of cervical cancer [Z85.41] 10/11/2018 Not Applicable     Chronic    Gastroesophageal reflux disease with esophagitis [K21.0] 11/16/2017 Yes     Chronic    Essential hypertension [I10] 05/04/2015 Yes     Chronic      Problems Resolved During this Admission:       Discharged Condition: stable    Disposition: Home or Self Care    Follow Up:  Follow-up Information     Audrey Mustafa MD In 2 weeks.    Specialty:  Internal Medicine  Why:  Message left with office to schedule and contact patient  with date and time of appointment (no appointment available at present until 07/20/2020  Contact information:  [address]  [place] [address]  [phone]             Leslie Balbuena MD In 2 weeks.    Specialty:  Urology  Why:  Message left with office to schedule and contact patient with date and time of appointment (no appointment available at present until 07/15/2020  Contact information:  [address] [facility] [address]  [phone]                 Patient Instructions:   No discharge procedures on file.    Significant Diagnostic Studies: Labs: All labs within the past 24 hours have been reviewed    Pending Diagnostic Studies:     None         Medications:  Reconciled Home Medications:      Medication List      START taking these medications    oxyCODONE 5 MG immediate release tablet  Commonly known as:  ROXICODONE  Take 1 tablet (5 mg total) by mouth every 8 (eight) hours as needed for Pain.        CONTINUE taking these medications    amLODIPine 5 MG tablet  Commonly known as:  NORVASC  Take 5 mg by mouth.     cephALEXin 500 MG capsule  Commonly known as:  KEFLEX  Take 500 mg by mouth.     docusate sodium 100 MG capsule  Commonly known as:  COLACE  Take 1 capsule (100 mg total) by mouth 2 (two) times daily as needed for Constipation.     gabapentin 300 MG capsule  Commonly known as:  NEURONTIN  Take 1 capsule (300 mg total) by mouth 3 (three) times daily.     lisinopriL 10 MG tablet  Take 1 tablet (10 mg total) by mouth once daily.     omeprazole 40 MG capsule  Commonly known as:  PRILOSEC  Take 1 capsule (40 mg total) by mouth once daily.     traZODone 50 MG tablet  Commonly known as:  DESYREL  TAKE 1 TABLET (50 MG TOTAL) BY MOUTH EVERY EVENING.            Indwelling Lines/Drains at time of discharge:   Lines/Drains/Airways     Drain                 Nephrostomy 06/11/20 1223 Left 12 Fr. 1 day         Nephrostomy 06/11/20 1223 Right 12 Fr. 1 day                Time spent on the discharge  of patient: 35 minutes  Patient was seen and examined on the date of discharge and determined to be suitable for discharge.         Eddie Bashir DO  Department of Hospital Medicine  [facility]

## 2020-06-13 NOTE — PLAN OF CARE
POC reviewed with pt, pt verbalized understanding. Safety maintained throughout shift, bed locked and in lowest position, call light in reach, Side rails up X 2. Non skid sock on when OOB. Pt remained free of fall/trauma. VSS, afebrile this shift. Self report of pain to bilateral nephrostomy sites reported, treated with PRN pain medication.  Pt up in chair most of shift. K 3.4 replaced this AM.  Bilateral nephrostomy tubes in place, draining freely clear yellow urine at discharge. Dressing to bilateral nephrostomy tube sites clean, dry and intact  Discharge instruction, follow up appointments and medication instructions given to pt, pt verbalized understanding. IV discontinued, site asymptomatic, pressure dressing applied. Rx included with  discharge instruction to be taken to the pharmacy of pt choice. Pt spouse transporting pt home. All item gathered and taken at the time of discharge. Wheelchair assistance provided to the car

## 2020-06-15 ENCOUNTER — TELEPHONE (OUTPATIENT)
Dept: SURGERY | Facility: CLINIC | Age: 55
End: 2020-06-15

## 2020-06-15 ENCOUNTER — TELEPHONE (OUTPATIENT)
Dept: INTERNAL MEDICINE | Facility: CLINIC | Age: 55
End: 2020-06-15

## 2020-06-15 ENCOUNTER — PATIENT OUTREACH (OUTPATIENT)
Dept: ADMINISTRATIVE | Facility: CLINIC | Age: 55
End: 2020-06-15

## 2020-06-15 NOTE — TELEPHONE ENCOUNTER
----- Message from Shruthi Hyde sent at 6/15/2020  1:34 PM CDT -----  Regarding: returned call  Contact: patient [phone]  Patient is returning a phone call.  Who left a message for the patient: Dr. Mustafa  Does patient know what this is regarding:  Not sure  Comments:

## 2020-06-15 NOTE — TELEPHONE ENCOUNTER
No answer. I was returning call to patient who had called in regards to scheduling a consult for a colonoscopy. According to last colonoscopy in 2018 by Dr Gutiérrez, patient does not need another colonoscopy for 5 years (2023).

## 2020-06-15 NOTE — PATIENT INSTRUCTIONS
When Your Child Has Hydronephrosis     With hydronephrosis, a problem in the urinary tract keeps urine from draining properly, causing the kidneys to fill with urine.   One or more of your childs kidneys is enlarged because of urine backup. This is called hydronephrosis. The problem may have been diagnosed before your child was even born. Often, the condition is not serious. In fact, in many children the problem goes away with time. In some cases, treatment is needed. Your childs healthcare provider can tell you about treatment options. Your child may see a pediatric urologist. This is a doctor who manages problems of the urinary tract in children.  What is hydronephrosis?  Hydronephrosis is swelling of a kidney due to a backup of urine. It may be mild, moderate, or severe.  What causes hydronephrosis?  There can be several causes of urine backup. There may be a blockage in the urinary tract that does not let urine flow normally. Urine may flow the wrong way (reflux) back up to the kidneys. Or urine may drain too slowly down from the kidneys. These problems can lead to a swollen kidney and may cause permanent damage to the kidney in the long term. Tests can be done to find the cause of your child's condition. The healthcare provider will tell you more. Uncommonly, the blockage may be within the kidney itself.  How is hydronephrosis diagnosed?  A swollen kidney may be seen on ultrasounds done during pregnancy. Once the baby is born, he or she may have a test to confirm hydronephrosis. This test is called a renal (kidney) ultrasound. Urine that does not flow normally can lead to a urinary tract infection (UTI). A renal ultrasound test may be done if a baby or child has a UTI.  How is hydronephrosis treated?  Treatment depends on the cause of the urine backup. It also depends on how bad the problem is. A mild problem may go away on its own without treatment. If the problem was found with prenatal ultrasound,  treatment may wait until the baby is born. The goal of treatment is to protect the childs kidneys as he or she grows. Your childs healthcare provider can talk with you about how best to treat your child. Your child may need:  · Follow-up ultrasounds to monitor kidney health  · Surgery to improve urine flow if the problem is severe  What are the long-term concerns?  A mild case of hydronephrosis may cause no problems. But a severe case or one that gets worse can lead to kidney damage. Your childs condition will be watched closely. If needed, treatment can be done to prevent long-term problems.    © 3258-2250 [employer]. [address] [place] [address], [place] [address]. All rights reserved. This information is not intended as a substitute for professional medical care. Always follow your healthcare professional's instructions.

## 2020-06-16 LAB
BACTERIA BLD CULT: NORMAL
BACTERIA BLD CULT: NORMAL

## 2020-06-19 ENCOUNTER — OFFICE VISIT (OUTPATIENT)
Dept: INTERNAL MEDICINE | Facility: CLINIC | Age: 55
End: 2020-06-19
Payer: MEDICAID

## 2020-06-19 VITALS
SYSTOLIC BLOOD PRESSURE: 130 MMHG | HEIGHT: 69 IN | HEART RATE: 80 BPM | WEIGHT: 214.94 LBS | DIASTOLIC BLOOD PRESSURE: 75 MMHG | BODY MASS INDEX: 31.84 KG/M2 | OXYGEN SATURATION: 99 %

## 2020-06-19 DIAGNOSIS — Z09 HOSPITAL DISCHARGE FOLLOW-UP: Primary | ICD-10-CM

## 2020-06-19 PROCEDURE — 99999 PR PBB SHADOW E&M-EST. PATIENT-LVL IV: ICD-10-PCS | Mod: PBBFAC,,, | Performed by: STUDENT IN AN ORGANIZED HEALTH CARE EDUCATION/TRAINING PROGRAM

## 2020-06-19 PROCEDURE — 99212 PR OFFICE/OUTPT VISIT, EST, LEVL II, 10-19 MIN: ICD-10-PCS | Mod: S$PBB,,, | Performed by: STUDENT IN AN ORGANIZED HEALTH CARE EDUCATION/TRAINING PROGRAM

## 2020-06-19 PROCEDURE — 99212 OFFICE O/P EST SF 10 MIN: CPT | Mod: S$PBB,,, | Performed by: STUDENT IN AN ORGANIZED HEALTH CARE EDUCATION/TRAINING PROGRAM

## 2020-06-19 PROCEDURE — 99214 OFFICE O/P EST MOD 30 MIN: CPT | Mod: PBBFAC | Performed by: STUDENT IN AN ORGANIZED HEALTH CARE EDUCATION/TRAINING PROGRAM

## 2020-06-19 PROCEDURE — 99999 PR PBB SHADOW E&M-EST. PATIENT-LVL IV: CPT | Mod: PBBFAC,,, | Performed by: STUDENT IN AN ORGANIZED HEALTH CARE EDUCATION/TRAINING PROGRAM

## 2020-06-19 NOTE — PATIENT INSTRUCTIONS
--  Make sure to take Keflex 500 mg everyday   --  Call or go to ER if you start having fever > 100.3 F, chills, changed color or blood if the tube.  --  Follow up with colorectal surgery and Urology

## 2020-06-19 NOTE — PROGRESS NOTES
Edita Riley  1965        Subjective     Chief Complaint:    History of Present Illness:  Ms. Edita Riley is a 55 y.o. female who present to clinic today as hospital follow up. 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 Shw recently admitted to [facility] with hydronephrosis in the setting of N/V. She had nephrostomy tubes replaced by IR on 6/11 and pt was discharged home. She states that she has been doing well since discharge 6/12 and reports no abdominal pain, N/V, flank pain. She reports normal function of the tubes and she changed the bag every 5 minutes with clear yellow urine and no blood. She has good appetite and try to stay away from fatty food the triggers her abdominal pain. She has an appointment with CRS dr. Reynolds 7/13/2020 to discuss about Uretro-sigmoidal Conduit. She is taking cephalexin as prophylaxes. She denies fever, chills, abdominal pain, N/V, CP, SOB. She lives with her  who help her with taking care of dressing for the tube.     Review of Systems   Constitutional: Negative for chills, fever and malaise/fatigue.   Respiratory: Negative for cough and shortness of breath.    Cardiovascular: Positive for leg swelling. Negative for chest pain.   Gastrointestinal: Negative for abdominal pain, diarrhea, heartburn, nausea and vomiting.   Genitourinary: Negative for flank pain.   Musculoskeletal: Positive for myalgias.   Skin: Negative for rash.   Neurological: Negative for dizziness and headaches.   Psychiatric/Behavioral: Negative for depression.       PAST HISTORY:     Past Medical History:   Diagnosis Date    Anxiety     Cervical cancer 2014    Chronic back pain     Depression     Diarrhea due to malabsorption 11/14/2018    Fibromyalgia     GERD (gastroesophageal reflux disease)     Hiatal hernia 2014    History of cervical cancer 10/11/2018    Hx of psychiatric care      Cymbalta, trazodone    Hypertension     Hypomagnesemia 11/21/2018    Lactose intolerance     Metastatic squamous cell carcinoma to lymph node 10/11/2018    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 drug 11/14/2018    Osteoarthritis of back     Psychiatric problem     Stroke 1972       Past Surgical History:   Procedure Laterality Date    BILATERAL OOPHORECTOMY  2015    CHOLECYSTECTOMY  11/09/2016    Done at [facility], path showed chronic cholecystitis and gallstones    cold knife conization  2014    COLONOSCOPY  2014    COLONOSCOPY N/A 10/24/2016    at [facility], Dr Gutiérrez    COLONOSCOPY N/A 5/18/2018    Procedure: COLONOSCOPY;  Surgeon: Arden Gutiérrez MD;  Location: [facility] (4TH FLR);  Service: Endoscopy;  Laterality: N/A;    CYSTOSCOPY WITH URETEROSCOPY, RETROGRADE PYELOGRAPHY, AND INSERTION OF STENT Bilateral 3/21/2020    Procedure: CYSTOSCOPY, WITH RETROGRADE PYELOGRAM,;  Surgeon: Leslie Balbuena MD;  Location: [facility] OR 1ST FLR;  Service: Urology;  Laterality: Bilateral;    ESOPHAGOGASTRODUODENOSCOPY  2014    HYSTERECTOMY  2014    [facility] for cervical cancer    OOPHORECTOMY      RETROPERITONEAL LYMPHADENECTOMY Right 9/17/2018    Procedure: LYMPHADENECTOMY, RETROPERITONEUM;  Surgeon: Sebas Reed MD;  Location: [facility] OR 2ND FLR;  Service: General;  Laterality: Right;  ILIAC       Family History   Problem Relation Age of Onset    Heart disease Sister     Heart disease Maternal Grandmother     Colon cancer Father     Esophageal cancer Father     Cancer Father         Lung-smoker    Cancer Mother         Cervical    Cervical cancer Mother     Breast cancer Maternal Aunt     Suicide Daughter         jumped from parking structure    Drug abuse Daughter     Drug abuse Daughter     Rectal cancer Neg Hx     Stomach cancer Neg Hx     Crohn's disease Neg Hx     Ulcerative colitis Neg Hx     Diabetes Neg Hx     Hypertension Neg Hx        Social History     Socioeconomic History    Marital  status:      Spouse name: Hammad    Number of children: 2    Years of education: Not on file    Highest education level: Not on file   Occupational History    Not on file   Social Needs    Financial resource strain: Not on file    Food insecurity     Worry: Not on file     Inability: Not on file    Transportation needs     Medical: Not on file     Non-medical: Not on file   Tobacco Use    Smoking status: Former Smoker    Smokeless tobacco: Never Used   Substance and Sexual Activity    Alcohol use: No     Alcohol/week: 0.0 standard drinks    Drug use: No    Sexual activity: Yes     Partners: Male     Birth control/protection: None     Comment:  19 years since    Lifestyle    Physical activity     Days per week: Not on file     Minutes per session: Not on file    Stress: Not on file   Relationships    Social connections     Talks on phone: Not on file     Gets together: Not on file     Attends Mu-ism service: Not on file     Active member of club or organization: Not on file     Attends meetings of clubs or organizations: Not on file     Relationship status: Not on file   Other Topics Concern    Patient feels they ought to cut down on drinking/drug use Not Asked    Patient annoyed by others criticizing their drinking/drug use Not Asked    Patient has felt bad or guilty about drinking/drug use Not Asked    Patient has had a drink/used drugs as an eye opener in the AM Not Asked   Social History Narrative    , twin daughters (1  2018), disabled due to childhood stroke, [employer] sophia       MEDICATIONS & ALLERGIES:     Current Outpatient Medications on File Prior to Visit   Medication Sig    amLODIPine (NORVASC) 5 MG tablet Take 5 mg by mouth.    cephALEXin (KEFLEX) 500 MG capsule Take 500 mg by mouth.    docusate sodium (COLACE) 100 MG capsule Take 1 capsule (100 mg total) by mouth 2 (two) times daily as needed for Constipation.    gabapentin  "(NEURONTIN) 300 MG capsule Take 1 capsule (300 mg total) by mouth 3 (three) times daily.    lisinopril 10 MG tablet Take 1 tablet (10 mg total) by mouth once daily.    omeprazole (PRILOSEC) 40 MG capsule Take 1 capsule (40 mg total) by mouth once daily.    oxyCODONE (ROXICODONE) 5 MG immediate release tablet Take 1 tablet (5 mg total) by mouth every 8 (eight) hours as needed for Pain.    traZODone (DESYREL) 50 MG tablet TAKE 1 TABLET (50 MG TOTAL) BY MOUTH EVERY EVENING.     No current facility-administered medications on file prior to visit.        Review of patient's allergies indicates:   Allergen Reactions    Bee sting [allergen ext-venom-honey bee]      Rash      Grass pollen-bermuda, standard      rash       OBJECTIVE:     Vital Signs:  Vitals:    06/19/20 1450   BP: 130/75   BP Location: Right arm   Patient Position: Sitting   BP Method: Large (Manual)   Pulse: 80   SpO2: 99%   Weight: 97.5 kg (214 lb 15.2 oz)   Height: 5' 9" (1.753 m)       Body mass index is 31.74 kg/m².     Physical Exam:  General:  Well developed, well nourished, no acute distress  Head: Normocephalic, atraumatic  Eyes: PERRL, EOMI, clear sclera  Throat: No posterior pharyngeal erythema or exudate, no tonsillar exudate  Neck: supple, normal ROM, no thyromegaly   CVS:  RRR, S1 and S2 normal, no murmurs, rubs, gallops, 2+ peripheral pulses  Resp:  Lungs clear to auscultation, no wheezes, rales, rhonchi, cough  GI:  Abdomen soft, non-tender, non-distended, normoactive bowel sounds  : Bilateral nephrostomy tubes- draining appropriately.  No noted tenderness, erythema, drainage, purulence of either site. Draining yellow urine  No CVA tenderness bilaterally.   MSK:  No muscle atrophy, cyanosis, peripheral edema   Skin:  No rashes, ulcers, erythema  Neuro:  CNII-XII grossly intact, no focal deficits noted  Psych:  Appropriate mood and affect, normal judgement    Laboratory  Lab Results   Component Value Date    WBC 8.12 06/12/2020    HGB " 9.9 (L) 06/12/2020    HCT 33.3 (L) 06/12/2020    MCV 91 06/12/2020     (H) 06/12/2020     Lab Results   Component Value Date     06/12/2020     06/12/2020    K 3.4 (L) 06/12/2020     06/12/2020    CO2 26 06/12/2020    BUN 11 06/12/2020    CREATININE 1.2 06/12/2020    CALCIUM 9.0 06/12/2020    MG 1.8 06/12/2020     Lab Results   Component Value Date    INR 1.0 06/12/2020    INR 1.0 06/11/2020    INR 1.0 12/02/2018     Lab Results   Component Value Date    HGBA1C 5.6 11/16/2017     No results for input(s): POCTGLUCOSE in the last 72 hours.    ASSESSMENT & PLAN:   Ms. Edita Riley is a 55 y.o. female here for hospital F/U s/p b/l nephrostomy change. Reports feeling well with no acute issues or complaints.       Hospital discharge follow-up:   -  Feeling well, no acute issues or complaint.  - the new nephrostomy tubes functioning well with no blockage or signs of infection  - F/U appointment with CRS in the clinic next month for the Conduit  - pt instructed about the importance of taking prophylaxis antibiotic for recurrent UTI    - F/U appoint here in the clinic within a month.           RTC in one month     Federico Hunter MD  Internal Medicine PGY1  [facility]  [address] [place] [address]

## 2020-07-08 ENCOUNTER — TELEPHONE (OUTPATIENT)
Dept: INTERNAL MEDICINE | Facility: CLINIC | Age: 55
End: 2020-07-08

## 2020-07-08 NOTE — TELEPHONE ENCOUNTER
----- Message from Isai Cruz sent at 7/8/2020  3:02 PM CDT -----  Regarding: preauthorization for medication  Audrey Mustafa MD Pt would like to be called back regarding on medication omeprazole MG 40 needs to have pre authorization to insurance with most recent upper G.I. results.    Pt can be reached at [phone] . Ms. Rae    Thank You.

## 2020-07-10 DIAGNOSIS — K21.00 GASTROESOPHAGEAL REFLUX DISEASE WITH ESOPHAGITIS: ICD-10-CM

## 2020-07-10 RX ORDER — OMEPRAZOLE 40 MG/1
40 CAPSULE, DELAYED RELEASE ORAL DAILY
Qty: 90 CAPSULE | Refills: 3 | Status: ON HOLD | OUTPATIENT
Start: 2020-07-10 | End: 2020-12-03 | Stop reason: HOSPADM

## 2020-07-13 ENCOUNTER — OFFICE VISIT (OUTPATIENT)
Dept: SURGERY | Facility: CLINIC | Age: 55
End: 2020-07-13
Payer: MEDICAID

## 2020-07-13 VITALS
WEIGHT: 212.94 LBS | SYSTOLIC BLOOD PRESSURE: 124 MMHG | HEIGHT: 69 IN | HEART RATE: 90 BPM | DIASTOLIC BLOOD PRESSURE: 56 MMHG | BODY MASS INDEX: 31.54 KG/M2

## 2020-07-13 DIAGNOSIS — N32.0 BLADDER OUTLET OBSTRUCTION: Primary | ICD-10-CM

## 2020-07-13 DIAGNOSIS — Z86.010 HISTORY OF COLON POLYPS: ICD-10-CM

## 2020-07-13 DIAGNOSIS — Z12.11 COLON CANCER SCREENING: ICD-10-CM

## 2020-07-13 PROCEDURE — 99203 PR OFFICE/OUTPT VISIT, NEW, LEVL III, 30-44 MIN: ICD-10-PCS | Mod: S$PBB,,, | Performed by: COLON & RECTAL SURGERY

## 2020-07-13 PROCEDURE — 99203 OFFICE O/P NEW LOW 30 MIN: CPT | Mod: S$PBB,,, | Performed by: COLON & RECTAL SURGERY

## 2020-07-13 PROCEDURE — 99213 OFFICE O/P EST LOW 20 MIN: CPT | Mod: PBBFAC | Performed by: COLON & RECTAL SURGERY

## 2020-07-13 PROCEDURE — 99999 PR PBB SHADOW E&M-EST. PATIENT-LVL III: CPT | Mod: PBBFAC,,, | Performed by: COLON & RECTAL SURGERY

## 2020-07-13 PROCEDURE — 99999 PR PBB SHADOW E&M-EST. PATIENT-LVL III: ICD-10-PCS | Mod: PBBFAC,,, | Performed by: COLON & RECTAL SURGERY

## 2020-07-13 NOTE — LETTER
July 23, 2020      Leslie Balbuena MD  [address]  [facility] [address]           [facility]  [address]  [facility] [address]  Phone: [phone]          Patient: Edita Riley   MR Number: [id number]   YOB: 1965   Date of Visit: 7/13/2020       Dear Dr. Leslie Balbuena:    Thank you for referring Edita Riley to me for evaluation. Attached you will find relevant portions of my assessment and plan of care.    If you have questions, please do not hesitate to call me. I look forward to following Edita Riley along with you.    Sincerely,    Hammad Reynolds MD    Enclosure  CC:  No Recipients    If you would like to receive this communication electronically, please contact [email or web address] or [phone] to request more information on [employer] access.    For providers and/or their staff who would like to refer a patient to [facility], please contact us through our one-stop-shop provider referral line, [facility], at [phone].    If you feel you have received this communication in error or would no longer like to receive these types of communications, please e-mail [email or web address]

## 2020-07-14 DIAGNOSIS — Z12.11 SPECIAL SCREENING FOR MALIGNANT NEOPLASMS, COLON: Primary | ICD-10-CM

## 2020-07-14 DIAGNOSIS — Z01.818 PRE-OP TESTING: ICD-10-CM

## 2020-07-14 RX ORDER — POLYETHYLENE GLYCOL 3350, SODIUM SULFATE ANHYDROUS, SODIUM BICARBONATE, SODIUM CHLORIDE, POTASSIUM CHLORIDE 236; 22.74; 6.74; 5.86; 2.97 G/4L; G/4L; G/4L; G/4L; G/4L
4 POWDER, FOR SOLUTION ORAL ONCE
Qty: 4000 ML | Refills: 0 | Status: SHIPPED | OUTPATIENT
Start: 2020-07-14 | End: 2020-07-14

## 2020-07-20 ENCOUNTER — OFFICE VISIT (OUTPATIENT)
Dept: INTERNAL MEDICINE | Facility: CLINIC | Age: 55
End: 2020-07-20
Payer: MEDICAID

## 2020-07-20 ENCOUNTER — TELEPHONE (OUTPATIENT)
Dept: PHARMACY | Facility: CLINIC | Age: 55
End: 2020-07-20

## 2020-07-20 VITALS
DIASTOLIC BLOOD PRESSURE: 70 MMHG | SYSTOLIC BLOOD PRESSURE: 120 MMHG | HEIGHT: 69 IN | WEIGHT: 208.31 LBS | BODY MASS INDEX: 30.85 KG/M2 | OXYGEN SATURATION: 98 % | HEART RATE: 93 BPM

## 2020-07-20 DIAGNOSIS — B36.0 TINEA VERSICOLOR: Primary | ICD-10-CM

## 2020-07-20 DIAGNOSIS — N13.30 HYDRONEPHROSIS, UNSPECIFIED HYDRONEPHROSIS TYPE: ICD-10-CM

## 2020-07-20 PROCEDURE — 99999 PR PBB SHADOW E&M-EST. PATIENT-LVL IV: CPT | Mod: PBBFAC,,, | Performed by: STUDENT IN AN ORGANIZED HEALTH CARE EDUCATION/TRAINING PROGRAM

## 2020-07-20 PROCEDURE — 99213 PR OFFICE/OUTPT VISIT, EST, LEVL III, 20-29 MIN: ICD-10-PCS | Mod: S$PBB,,, | Performed by: STUDENT IN AN ORGANIZED HEALTH CARE EDUCATION/TRAINING PROGRAM

## 2020-07-20 PROCEDURE — 99999 PR PBB SHADOW E&M-EST. PATIENT-LVL IV: ICD-10-PCS | Mod: PBBFAC,,, | Performed by: STUDENT IN AN ORGANIZED HEALTH CARE EDUCATION/TRAINING PROGRAM

## 2020-07-20 PROCEDURE — 99214 OFFICE O/P EST MOD 30 MIN: CPT | Mod: PBBFAC | Performed by: STUDENT IN AN ORGANIZED HEALTH CARE EDUCATION/TRAINING PROGRAM

## 2020-07-20 PROCEDURE — 99213 OFFICE O/P EST LOW 20 MIN: CPT | Mod: S$PBB,,, | Performed by: STUDENT IN AN ORGANIZED HEALTH CARE EDUCATION/TRAINING PROGRAM

## 2020-07-20 RX ORDER — ITRACONAZOLE 100 MG/1
400 CAPSULE ORAL DAILY
Qty: 4 CAPSULE | Refills: 0 | Status: SHIPPED | OUTPATIENT
Start: 2020-07-20 | End: 2020-07-30

## 2020-07-20 RX ORDER — KETOCONAZOLE 20 MG/G
CREAM TOPICAL DAILY
Qty: 30 G | Refills: 1 | Status: SHIPPED | OUTPATIENT
Start: 2020-07-20 | End: 2020-07-20

## 2020-07-20 RX ORDER — KETOCONAZOLE 20 MG/G
CREAM TOPICAL DAILY
Qty: 30 G | Refills: 1 | Status: ON HOLD | OUTPATIENT
Start: 2020-07-20 | End: 2020-12-03 | Stop reason: HOSPADM

## 2020-07-20 RX ORDER — ITRACONAZOLE 100 MG/1
400 CAPSULE ORAL DAILY
Qty: 4 CAPSULE | Refills: 0 | Status: SHIPPED | OUTPATIENT
Start: 2020-07-20 | End: 2020-07-20

## 2020-07-20 NOTE — PROGRESS NOTES
Clinic Note  7/20/2020      Subjective:       Patient ID:  Edita is a 55 y.o. female being seen for an established visit.    Chief Complaint: Follow-up    HPI    Edita Riley is a 55 y.o. AA female who has a past medical history of cervical cancer s/p hysterectomy in 2015, with mets to R common iliac lymph node treated with chemo/XRT in 2019 complicated with radiation cystitis, CVA with L sided weakness, HTN, Fibromyalgia, and GERD who presents for follow-up and evaluation of rash .     Since her last visit with me on 9/2019, she has been hospitalized multiple times of urological complications related to radiation cystitis which was diagnosed this year. She currently has 2 nephrostomy tubes in place. She currently reports no issues with the tubes and states that they are draining well. She denies blood, purulence, increased back pain, or urine discoloration in the nephrostomy bags. She last saw urology in 6/8/2020 and is also following with CRS for upcoming surgery for conduit creation, Colonoscopy to preparation for this is scheduled on 7/28/2020.    Additionally, she reports developing a rash of hyperpigmentation that started about 3 weeks ago. The rash is located on predominantly on her arms and legs. It is not painful or pruitic. She denies starting new medications or prior history of a similar rash. She states that she has been sitting outside on her pouch more laterly. She denies chest pain, SOB, or fever.    Last Hospitalizations:  3/21-3/22 at [facility]: admitted for R pyelonephroitis, bilateral nephrostomy tubes and ureteral stents were placed. Urology consulted and performed cystoscopy w/ biopsy (TURBT) later showing radiation cystitis.     4/25-4/30 at [facility]: admitted for recurrent hydronephrosis s/p tubes, Urology recommended IR for removal of BL nephrostomy tube and PCN placement bilaterally.    6/10-6/12 at [facility]: admitted for recurrent abdominal pain. Initial concern for recurrent pyelonephritis  but urology consulted and felt symptoms were not consistent with pyelo. Urology recommended consulting IR to up size nephrostomy tubes which was performed.         Review of Systems   Constitutional: Negative for chills and fever.   HENT: Negative for hearing loss and sore throat.    Eyes: Negative for blurred vision and double vision.   Respiratory: Negative for cough and shortness of breath.    Cardiovascular: Negative for chest pain and leg swelling.   Gastrointestinal: Negative for abdominal pain, constipation, diarrhea, nausea and vomiting.   Genitourinary: Negative for dysuria and hematuria.   Musculoskeletal: Negative for falls and myalgias.   Skin: Positive for rash.   Neurological: Negative for dizziness, speech change, weakness and headaches.   Psychiatric/Behavioral: Negative for depression. The patient is not nervous/anxious.        Past Medical History:   Diagnosis Date    Anxiety     Cervical cancer 2014    Chronic back pain     Depression     Diarrhea due to malabsorption 11/14/2018    Fibromyalgia     GERD (gastroesophageal reflux disease)     Hiatal hernia 2014    History of cervical cancer 10/11/2018    Hx of psychiatric care     Cymbalta, trazodone    Hypertension     Hypomagnesemia 11/21/2018    Lactose intolerance     Metastatic squamous cell carcinoma to lymph node 10/11/2018    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 drug 11/14/2018    Osteoarthritis of back     Psychiatric problem     Stroke 1972       Family History   Problem Relation Age of Onset    Heart disease Sister     Heart disease Maternal Grandmother     Colon cancer Father     Esophageal cancer Father     Cancer Father         Lung-smoker    Cancer Mother         Cervical    Cervical cancer Mother     Breast cancer Maternal Aunt     Suicide Daughter         jumped from parking structure    Drug abuse Daughter     Drug abuse Daughter     Rectal cancer Neg Hx     Stomach cancer Neg Hx     Crohn's disease  Neg Hx     Ulcerative colitis Neg Hx     Diabetes Neg Hx     Hypertension Neg Hx         reports that she has quit smoking. She has never used smokeless tobacco. She reports that she does not drink alcohol or use drugs.    Medication List with Changes/Refills   New Medications    ITRACONAZOLE (SPORANOX) 100 MG CAP    Take 4 capsules (400 mg total) by mouth once daily. for 1 day    KETOCONAZOLE (NIZORAL) 2 % CREAM    Apply topically once daily. Apply to affected area   Current Medications    AMLODIPINE (NORVASC) 5 MG TABLET    Take 5 mg by mouth.    CEPHALEXIN (KEFLEX) 500 MG CAPSULE    Take 500 mg by mouth.    DOCUSATE SODIUM (COLACE) 100 MG CAPSULE    Take 1 capsule (100 mg total) by mouth 2 (two) times daily as needed for Constipation.    GABAPENTIN (NEURONTIN) 300 MG CAPSULE    Take 1 capsule (300 mg total) by mouth 3 (three) times daily.    LISINOPRIL 10 MG TABLET    Take 1 tablet (10 mg total) by mouth once daily.    OMEPRAZOLE (PRILOSEC) 40 MG CAPSULE    Take 1 capsule (40 mg total) by mouth once daily.    OXYCODONE (ROXICODONE) 5 MG IMMEDIATE RELEASE TABLET    Take 1 tablet (5 mg total) by mouth every 8 (eight) hours as needed for Pain.    TRAZODONE (DESYREL) 50 MG TABLET    TAKE 1 TABLET (50 MG TOTAL) BY MOUTH EVERY EVENING.     Review of patient's allergies indicates:   Allergen Reactions    Bee sting [allergen ext-venom-honey bee]      Rash      Grass pollen-bermuda, standard      rash       Patient Active Problem List   Diagnosis    Osteoarthritis of back    Hiatal hernia    Essential hypertension    Lower extremity pain, diffuse    Weakness of both lower extremities    Impaired functional mobility, balance, gait, and endurance    Schatzki's ring    Colon polyp    Internal hemorrhoids    Cardiovascular event risk -- low    Hemifacial spasm    Severe episode of recurrent major depressive disorder, with psychotic features    Fibromyalgia    Facial palsy    Sleep stage dysfunction     "Carpal tunnel syndrome on right    Weakness    Difficulty walking    Gastroesophageal reflux disease with esophagitis    Abnormal CT of the abdomen    Lymphadenopathy    History of cervical cancer    Metastatic squamous cell carcinoma to lymph node    Generalized anxiety disorder    PTSD (post-traumatic stress disorder)    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 drug    Diarrhea due to malabsorption    Seizure-like activity    Stroke    Electrolyte disorder    ODESSA (acute kidney injury)    Hydronephrosis of right kidney    Anemia due to chronic kidney disease    Need for shingles vaccine    Pyelonephritis    Hydronephrosis    Urinary tract infection associated with nephrostomy catheter           Objective:      /70 (BP Location: Left arm, Patient Position: Sitting, BP Method: Large (Manual))   Pulse 93   Ht 5' 9" (1.753 m)   Wt 94.5 kg (208 lb 5.4 oz)   LMP 06/08/2014 (Approximate)   SpO2 98%   BMI 30.77 kg/m²   Estimated body mass index is 30.77 kg/m² as calculated from the following:    Height as of this encounter: 5' 9" (1.753 m).    Weight as of this encounter: 94.5 kg (208 lb 5.4 oz).  Physical Exam   Constitutional: She is well-developed, well-nourished, and in no distress. No distress.   HENT:   Head: Normocephalic.   Eyes: Right eye exhibits no discharge. Left eye exhibits no discharge.   Cardiovascular: Normal rate and normal heart sounds. Exam reveals no gallop and no friction rub.   No murmur heard.  Pulmonary/Chest: She has no wheezes. She has no rales.   Abdominal: Bowel sounds are normal. There is no abdominal tenderness. There is no rebound.   Musculoskeletal:         General: No edema.      Comments: Bilateral draining nephrostomy tubes, draining yellow urine into nephrostomy bags, no purulent drainage, blood, or discoloration noted in bag.    Diffuse areas of hyperpigmentation and hypopigmentation of arms, not raised and no erythema, primarily located on the dorsal aspect " of the arms bilaterally, not noted on trunk. Dry skin of lower extremities   Skin: Skin is warm and dry. Rash noted. She is not diaphoretic.   Nursing note and vitals reviewed.                Assessment and Plan:         Edita was seen today for follow-up.    Diagnoses and all orders for this visit:    Tinea versicolor:  Areas of hyperpigmentation that are non-pruritic and diffuse, appear to be more consistent with tinea versicolor. Given diffuse nature, less likely to be contact dermatitis. Will treat empirically with itraconazole 400mg PO once and ketoconazole 2% cream as well. Discussed will place referral to dermatology if rash does not improve.    -     Ambulatory referral/consult to Dermatology; Future  -     ketoconazole (NIZORAL) 2 % cream; Apply topically once daily. Apply to affected area  -     itraconazole (SPORANOX) 100 mg Cap; Take 4 capsules (400 mg total) by mouth once daily. for 1 day    Hydronephrosis due to radiation cystitis s/p bilateral nephrostomy tubes:  Plans for Conduit Surgery with CRS, colonscopy on 7/28. Discussed need to continue to follow with urology closely. No follow-up appointment on file, message sent to Urology Clinic to request appointment with Dr. Balbuena      Will follow-up in 3 months    Other Orders Placed This Visit:  Orders Placed This Encounter   Procedures    Ambulatory referral/consult to Dermatology         Audrey Mustafa    Discussed with Dr. Marinelli

## 2020-07-20 NOTE — PROGRESS NOTES
I have personally reviewed the results and orders of the last 24 hours, and discussed the plan of care with the resident/intern. I have reviewed the note attached in its entirety and agree with the documented findings and proposed plan.    Medically complex with hx of cervical cancer mets and urology complications.  Here today with apparent tinea versicolor, will treat with single oral dose and cream. Out of network referral to Derm (if does not clear).   Will get her back to urology         Cain Marinelli MD  Internal Medicine   [phone]

## 2020-07-21 ENCOUNTER — TELEPHONE (OUTPATIENT)
Dept: UROLOGY | Facility: CLINIC | Age: 55
End: 2020-07-21

## 2020-07-21 DIAGNOSIS — N13.5 OBSTRUCTION OF BOTH URETERS: Primary | ICD-10-CM

## 2020-07-21 NOTE — TELEPHONE ENCOUNTER
----- Message from Hellen Beltran LPN sent at 7/21/2020  8:27 AM CDT -----  Regarding: FW: Request for followup appt for established patient with b/l nephrostomy tubes  Please advise.   ----- Message -----  From: Audrey Mustafa MD  Sent: 7/20/2020   6:41 PM CDT  To: Sree Nelson Staff  Subject: Request for followup appt for established pa#    I am the PCP for this patient follows with Dr. Balbuena with bilateral nephrostomy tubes placed in March and still in place. She is scheduled for conduit surgery with CRS and will likely need these tubes removed after this. Please make follow up appointment with urology for this patient in August if possible.    Thank you,  Audrey Mustafa MD

## 2020-07-21 NOTE — TELEPHONE ENCOUNTER
I ordered a Lasix renal scan and a BMP to better assess her ureteral obstruction.  She needs to come to the office before the scan is done so I can be assured it is done with the tubes turned to the off position.

## 2020-07-23 NOTE — TELEPHONE ENCOUNTER
----- Message from Sunni Saha RN sent at 7/22/2020  6:45 PM CDT -----  Regarding: FW: Pt    ----- Message -----  From: Tiffanie Baldwin  Sent: 7/22/2020   4:26 PM CDT  To: Sree Nelson Staff  Subject: Pt                                               Reason: Pt returning call from yesterday regard to appts. Please call     Contact: [phone]

## 2020-07-24 NOTE — PROGRESS NOTES
Subjective:       Patient ID: Edita Riley is a 55 y.o. female.    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 Balbuena who is planning a transverse colon conduit.  Referred for colonoscopy prior to surgery & discussion regarding procurement of segment of transverse colon for conduit.  Patient reports BM's are typically loose since XRT.    Colonoscopy 5/2018 - normal x/c for small rectal polyp removed (path - early adenomatous transformation).    No family hx of CRC or IBD.      Review of patient's allergies indicates:   Allergen Reactions    Bee sting [allergen ext-venom-honey bee]      Rash      Grass pollen-bermuda, standard      rash       Past Medical History:   Diagnosis Date    Anxiety     Cervical cancer 2014    Chronic back pain     Depression     Diarrhea due to malabsorption 11/14/2018    Fibromyalgia     GERD (gastroesophageal reflux disease)     Hiatal hernia 2014    History of cervical cancer 10/11/2018    Hx of psychiatric care     Cymbalta, trazodone    Hypertension     Hypomagnesemia 11/21/2018    Lactose intolerance     Metastatic squamous cell carcinoma to lymph node 10/11/2018    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 drug 11/14/2018    Osteoarthritis of back     Psychiatric problem     Stroke 1972       Past Surgical History:   Procedure Laterality Date    BILATERAL OOPHORECTOMY  2015    CHOLECYSTECTOMY  11/09/2016    Done at [facility], path showed chronic cholecystitis and gallstones    cold knife conization  2014    COLONOSCOPY  2014    COLONOSCOPY N/A 10/24/2016    at [facility], Dr Thomas    COLONOSCOPY N/A 5/18/2018    Procedure: COLONOSCOPY;  Surgeon: Arden Gutiérrez MD;  Location: [address];  Service: Endoscopy;  Laterality: N/A;    CYSTOSCOPY  WITH URETEROSCOPY, RETROGRADE PYELOGRAPHY, AND INSERTION OF STENT Bilateral 3/21/2020    Procedure: CYSTOSCOPY, WITH RETROGRADE PYELOGRAM,;  Surgeon: Leslie Balbuena MD;  Location: [facility] OR 1ST FLR;  Service: Urology;  Laterality: Bilateral;    ESOPHAGOGASTRODUODENOSCOPY  2014    HYSTERECTOMY  2014    [facility] for [facility]    OOPHORECTOMY      RETROPERITONEAL LYMPHADENECTOMY Right 9/17/2018    Procedure: LYMPHADENECTOMY, RETROPERITONEUM;  Surgeon: Sebas Reed MD;  Location: [facility] OR 2ND FLR;  Service: General;  Laterality: Right;  ILIAC       Current Outpatient Medications   Medication Sig Dispense Refill    amLODIPine (NORVASC) 5 MG tablet Take 5 mg by mouth.      cephALEXin (KEFLEX) 500 MG capsule Take 500 mg by mouth.      docusate sodium (COLACE) 100 MG capsule Take 1 capsule (100 mg total) by mouth 2 (two) times daily as needed for Constipation. 30 capsule 0    gabapentin (NEURONTIN) 300 MG capsule Take 1 capsule (300 mg total) by mouth 3 (three) times daily. 270 capsule 3    lisinopril 10 MG tablet Take 1 tablet (10 mg total) by mouth once daily. 90 tablet 3    omeprazole (PRILOSEC) 40 MG capsule Take 1 capsule (40 mg total) by mouth once daily. 90 capsule 3    oxyCODONE (ROXICODONE) 5 MG immediate release tablet Take 1 tablet (5 mg total) by mouth every 8 (eight) hours as needed for Pain. 10 tablet 0    traZODone (DESYREL) 50 MG tablet TAKE 1 TABLET (50 MG TOTAL) BY MOUTH EVERY EVENING. 30 tablet 11    ketoconazole (NIZORAL) 2 % cream Apply topically once daily. Apply to affected area 30 g 1     No current facility-administered medications for this visit.        Family History   Problem Relation Age of Onset    Heart disease Sister     Heart disease Maternal Grandmother     Colon cancer Father     Esophageal cancer Father     Cancer Father         Lung-smoker    Cancer Mother         Cervical    Cervical cancer Mother     Breast cancer Maternal Aunt     Suicide Daughter          jumped from parking structure    Drug abuse Daughter     Drug abuse Daughter     Rectal cancer Neg Hx     Stomach cancer Neg Hx     Crohn's disease Neg Hx     Ulcerative colitis Neg Hx     Diabetes Neg Hx     Hypertension Neg Hx        Social History     Socioeconomic History    Marital status:      Spouse name: Hammad    Number of children: 2    Years of education: Not on file    Highest education level: Not on file   Occupational History    Not on file   Social Needs    Financial resource strain: Not on file    Food insecurity     Worry: Not on file     Inability: Not on file    Transportation needs     Medical: Not on file     Non-medical: Not on file   Tobacco Use    Smoking status: Former Smoker    Smokeless tobacco: Never Used   Substance and Sexual Activity    Alcohol use: No     Alcohol/week: 0.0 standard drinks    Drug use: No    Sexual activity: Yes     Partners: Male     Birth control/protection: None     Comment:  19 years since    Lifestyle    Physical activity     Days per week: Not on file     Minutes per session: Not on file    Stress: Not on file   Relationships    Social connections     Talks on phone: Not on file     Gets together: Not on file     Attends Christianity service: Not on file     Active member of club or organization: Not on file     Attends meetings of clubs or organizations: Not on file     Relationship status: Not on file   Other Topics Concern    Patient feels they ought to cut down on drinking/drug use Not Asked    Patient annoyed by others criticizing their drinking/drug use Not Asked    Patient has felt bad or guilty about drinking/drug use Not Asked    Patient has had a drink/used drugs as an eye opener in the AM Not Asked   Social History Narrative    , twin daughters (1  2018), disabled due to childhood stroke, Sabian[employer] sophia       Review of Systems   Constitutional: Negative for chills and fever.    HENT: Negative for congestion and sore throat.    Eyes: Negative for visual disturbance.   Respiratory: Negative for cough and shortness of breath.    Cardiovascular: Negative for chest pain and palpitations.   Gastrointestinal: Positive for diarrhea. Negative for abdominal distention, abdominal pain, anal bleeding, blood in stool, constipation, nausea, rectal pain and vomiting.   Endocrine: Negative for cold intolerance and heat intolerance.   Genitourinary: Negative for dysuria and frequency.   Musculoskeletal: Negative for arthralgias, back pain and neck pain.   Skin: Negative for rash.   Allergic/Immunologic: Negative for immunocompromised state.   Neurological: Negative for dizziness, light-headedness and headaches.   Hematological: Does not bruise/bleed easily.   Psychiatric/Behavioral: Negative for confusion. The patient is not nervous/anxious.        Objective:      Physical Exam  Constitutional:       Appearance: She is well-developed.   HENT:      Head: Normocephalic.   Neck:      Musculoskeletal: Normal range of motion and neck supple.   Cardiovascular:      Heart sounds: Normal heart sounds.   Pulmonary:      Effort: Pulmonary effort is normal. No respiratory distress.   Abdominal:      General: Bowel sounds are normal. There is no distension.      Palpations: Abdomen is soft. There is no mass.      Tenderness: There is no abdominal tenderness. There is no guarding or rebound.      Comments: B/L nephrostomy tubes   Musculoskeletal: Normal range of motion.   Skin:     General: Skin is warm and dry.   Neurological:      Mental Status: She is alert and oriented to person, place, and time.           Lab Results   Component Value Date    WBC 8.12 06/12/2020    HGB 9.9 (L) 06/12/2020    HCT 33.3 (L) 06/12/2020    MCV 91 06/12/2020     (H) 06/12/2020     BMP  Lab Results   Component Value Date     06/12/2020    K 3.4 (L) 06/12/2020     06/12/2020    CO2 26 06/12/2020    BUN 11 06/12/2020     CREATININE 1.2 06/12/2020    CALCIUM 9.0 06/12/2020    ANIONGAP 10 06/12/2020    ESTGFRAFRICA 58.0 (A) 06/12/2020    EGFRNONAA 50.3 (A) 06/12/2020     CMP  Sodium   Date Value Ref Range Status   06/12/2020 139 136 - 145 mmol/L Final     Potassium   Date Value Ref Range Status   06/12/2020 3.4 (L) 3.5 - 5.1 mmol/L Final     Chloride   Date Value Ref Range Status   06/12/2020 103 95 - 110 mmol/L Final     CO2   Date Value Ref Range Status   06/12/2020 26 23 - 29 mmol/L Final     Glucose   Date Value Ref Range Status   06/12/2020 102 70 - 110 mg/dL Final     BUN, Bld   Date Value Ref Range Status   06/12/2020 11 6 - 20 mg/dL Final     Creatinine   Date Value Ref Range Status   06/12/2020 1.2 0.5 - 1.4 mg/dL Final     Calcium   Date Value Ref Range Status   06/12/2020 9.0 8.7 - 10.5 mg/dL Final     Total Protein   Date Value Ref Range Status   06/12/2020 6.9 6.0 - 8.4 g/dL Final     Albumin   Date Value Ref Range Status   06/12/2020 2.8 (L) 3.5 - 5.2 g/dL Final     Total Bilirubin   Date Value Ref Range Status   06/12/2020 0.3 0.1 - 1.0 mg/dL Final     Comment:     For infants and newborns, interpretation of results should be based  on gestational age, weight and in agreement with clinical  observations.  Premature Infant recommended reference ranges:  Up to 24 hours.............<8.0 mg/dL  Up to 48 hours............<12.0 mg/dL  3-5 days..................<15.0 mg/dL  6-29 days.................<15.0 mg/dL       Alkaline Phosphatase   Date Value Ref Range Status   06/12/2020 109 55 - 135 U/L Final     AST   Date Value Ref Range Status   06/12/2020 19 10 - 40 U/L Final     ALT   Date Value Ref Range Status   06/12/2020 16 10 - 44 U/L Final     Anion Gap   Date Value Ref Range Status   06/12/2020 10 8 - 16 mmol/L Final     eGFR if    Date Value Ref Range Status   06/12/2020 58.0 (A) >60 mL/min/1.73 m^2 Final     eGFR if non    Date Value Ref Range Status   06/12/2020 50.3 (A) >60 mL/min/1.73  m^2 Final     Comment:     Calculation used to obtain the estimated glomerular filtration  rate (eGFR) is the CKD-EPI equation.        No results found for: CEA        Assessment:       1. Bladder outlet obstruction    2. Colon cancer screening    3. History of colon polyps        Plan:   Schedule for colonoscopy.  Assuming no contraindication based on colonoscopy findings, will be available for combined surgery with Urology for creation of colonic conduit for urinary diversion.    Hammad Reynolds MD, FACS, FASCRS  Senior Staff Surgeon  Department of Colon & Rectal Surgery     This note was created using voice recognition software, and may contain some unrecognized transcriptional errors.

## 2020-07-25 ENCOUNTER — LAB VISIT (OUTPATIENT)
Dept: SPORTS MEDICINE | Facility: CLINIC | Age: 55
End: 2020-07-25
Payer: MEDICAID

## 2020-07-25 DIAGNOSIS — Z01.818 PRE-OP TESTING: ICD-10-CM

## 2020-07-25 PROCEDURE — U0003 INFECTIOUS AGENT DETECTION BY NUCLEIC ACID (DNA OR RNA); SEVERE ACUTE RESPIRATORY SYNDROME CORONAVIRUS 2 (SARS-COV-2) (CORONAVIRUS DISEASE [COVID-19]), AMPLIFIED PROBE TECHNIQUE, MAKING USE OF HIGH THROUGHPUT TECHNOLOGIES AS DESCRIBED BY CMS-2020-01-R: HCPCS

## 2020-07-26 ENCOUNTER — HOSPITAL ENCOUNTER (EMERGENCY)
Facility: HOSPITAL | Age: 55
Discharge: HOME | End: 2020-07-27
Attending: EMERGENCY MEDICINE
Payer: MEDICAID

## 2020-07-26 DIAGNOSIS — K30 INDIGESTION: Primary | ICD-10-CM

## 2020-07-26 DIAGNOSIS — R11.10 EMESIS: ICD-10-CM

## 2020-07-26 LAB — SARS-COV-2 RNA RESP QL NAA+PROBE: NOT DETECTED

## 2020-07-26 PROCEDURE — 99285 EMERGENCY DEPT VISIT HI MDM: CPT | Mod: ,,, | Performed by: EMERGENCY MEDICINE

## 2020-07-26 PROCEDURE — 99285 EMERGENCY DEPT VISIT HI MDM: CPT | Mod: 25

## 2020-07-26 PROCEDURE — 96374 THER/PROPH/DIAG INJ IV PUSH: CPT

## 2020-07-26 PROCEDURE — 96375 TX/PRO/DX INJ NEW DRUG ADDON: CPT

## 2020-07-26 PROCEDURE — 99285 PR EMERGENCY DEPT VISIT,LEVEL V: ICD-10-PCS | Mod: ,,, | Performed by: EMERGENCY MEDICINE

## 2020-07-27 ENCOUNTER — ANESTHESIA EVENT (OUTPATIENT)
Dept: ENDOSCOPY | Facility: HOSPITAL | Age: 55
End: 2020-07-27
Payer: MEDICAID

## 2020-07-27 VITALS
HEART RATE: 86 BPM | TEMPERATURE: 98 F | HEIGHT: 69 IN | RESPIRATION RATE: 16 BRPM | WEIGHT: 211 LBS | OXYGEN SATURATION: 99 % | DIASTOLIC BLOOD PRESSURE: 63 MMHG | SYSTOLIC BLOOD PRESSURE: 112 MMHG | BODY MASS INDEX: 31.25 KG/M2

## 2020-07-27 LAB
ALBUMIN SERPL BCP-MCNC: 3.9 G/DL (ref 3.5–5.2)
ALP SERPL-CCNC: 110 U/L (ref 55–135)
ALT SERPL W/O P-5'-P-CCNC: 14 U/L (ref 10–44)
ANION GAP SERPL CALC-SCNC: 8 MMOL/L (ref 8–16)
AST SERPL-CCNC: 16 U/L (ref 10–40)
BASOPHILS # BLD AUTO: 0.03 K/UL (ref 0–0.2)
BASOPHILS NFR BLD: 0.3 % (ref 0–1.9)
BILIRUB SERPL-MCNC: 0.2 MG/DL (ref 0.1–1)
BUN SERPL-MCNC: 15 MG/DL (ref 6–20)
CALCIUM SERPL-MCNC: 9.8 MG/DL (ref 8.7–10.5)
CHLORIDE SERPL-SCNC: 104 MMOL/L (ref 95–110)
CO2 SERPL-SCNC: 28 MMOL/L (ref 23–29)
CREAT SERPL-MCNC: 1.3 MG/DL (ref 0.5–1.4)
DIFFERENTIAL METHOD: ABNORMAL
EOSINOPHIL # BLD AUTO: 0.2 K/UL (ref 0–0.5)
EOSINOPHIL NFR BLD: 1.8 % (ref 0–8)
ERYTHROCYTE [DISTWIDTH] IN BLOOD BY AUTOMATED COUNT: 14 % (ref 11.5–14.5)
EST. GFR  (AFRICAN AMERICAN): 52.6 ML/MIN/1.73 M^2
EST. GFR  (NON AFRICAN AMERICAN): 45.6 ML/MIN/1.73 M^2
GLUCOSE SERPL-MCNC: 117 MG/DL (ref 70–110)
HCT VFR BLD AUTO: 38.8 % (ref 37–48.5)
HGB BLD-MCNC: 11.5 G/DL (ref 12–16)
IMM GRANULOCYTES # BLD AUTO: 0.01 K/UL (ref 0–0.04)
IMM GRANULOCYTES NFR BLD AUTO: 0.1 % (ref 0–0.5)
LIPASE SERPL-CCNC: 24 U/L (ref 4–60)
LYMPHOCYTES # BLD AUTO: 1.7 K/UL (ref 1–4.8)
LYMPHOCYTES NFR BLD: 18.2 % (ref 18–48)
MAGNESIUM SERPL-MCNC: 1.9 MG/DL (ref 1.6–2.6)
MCH RBC QN AUTO: 26.7 PG (ref 27–31)
MCHC RBC AUTO-ENTMCNC: 29.6 G/DL (ref 32–36)
MCV RBC AUTO: 90 FL (ref 82–98)
MONOCYTES # BLD AUTO: 1 K/UL (ref 0.3–1)
MONOCYTES NFR BLD: 11.3 % (ref 4–15)
NEUTROPHILS # BLD AUTO: 6.3 K/UL (ref 1.8–7.7)
NEUTROPHILS NFR BLD: 68.3 % (ref 38–73)
NRBC BLD-RTO: 0 /100 WBC
PLATELET # BLD AUTO: 298 K/UL (ref 150–350)
PMV BLD AUTO: 10.6 FL (ref 9.2–12.9)
POTASSIUM SERPL-SCNC: 4 MMOL/L (ref 3.5–5.1)
PROT SERPL-MCNC: 8.4 G/DL (ref 6–8.4)
RBC # BLD AUTO: 4.31 M/UL (ref 4–5.4)
SODIUM SERPL-SCNC: 140 MMOL/L (ref 136–145)
WBC # BLD AUTO: 9.19 K/UL (ref 3.9–12.7)

## 2020-07-27 PROCEDURE — 93010 ELECTROCARDIOGRAM REPORT: CPT | Mod: ,,, | Performed by: INTERNAL MEDICINE

## 2020-07-27 PROCEDURE — 25000003 PHARM REV CODE 250: Performed by: STUDENT IN AN ORGANIZED HEALTH CARE EDUCATION/TRAINING PROGRAM

## 2020-07-27 PROCEDURE — 83735 ASSAY OF MAGNESIUM: CPT

## 2020-07-27 PROCEDURE — 80053 COMPREHEN METABOLIC PANEL: CPT

## 2020-07-27 PROCEDURE — 83690 ASSAY OF LIPASE: CPT

## 2020-07-27 PROCEDURE — 93005 ELECTROCARDIOGRAM TRACING: CPT

## 2020-07-27 PROCEDURE — S0028 INJECTION, FAMOTIDINE, 20 MG: HCPCS | Performed by: STUDENT IN AN ORGANIZED HEALTH CARE EDUCATION/TRAINING PROGRAM

## 2020-07-27 PROCEDURE — 85025 COMPLETE CBC W/AUTO DIFF WBC: CPT

## 2020-07-27 PROCEDURE — 93010 EKG 12-LEAD: ICD-10-PCS | Mod: ,,, | Performed by: INTERNAL MEDICINE

## 2020-07-27 PROCEDURE — 25500020 PHARM REV CODE 255: Performed by: EMERGENCY MEDICINE

## 2020-07-27 PROCEDURE — 63600175 PHARM REV CODE 636 W HCPCS: Performed by: STUDENT IN AN ORGANIZED HEALTH CARE EDUCATION/TRAINING PROGRAM

## 2020-07-27 RX ORDER — ONDANSETRON 4 MG/1
4 TABLET, ORALLY DISINTEGRATING ORAL EVERY 6 HOURS PRN
Qty: 5 TABLET | Refills: 0 | Status: SHIPPED | OUTPATIENT
Start: 2020-07-27 | End: 2020-08-01

## 2020-07-27 RX ORDER — FAMOTIDINE 20 MG/1
20 TABLET, FILM COATED ORAL 2 TIMES DAILY PRN
Qty: 30 TABLET | Refills: 0 | Status: ON HOLD | OUTPATIENT
Start: 2020-07-27 | End: 2020-07-28 | Stop reason: HOSPADM

## 2020-07-27 RX ORDER — MORPHINE SULFATE 4 MG/ML
4 INJECTION, SOLUTION INTRAMUSCULAR; INTRAVENOUS
Status: COMPLETED | OUTPATIENT
Start: 2020-07-27 | End: 2020-07-27

## 2020-07-27 RX ORDER — FAMOTIDINE 10 MG/ML
20 INJECTION INTRAVENOUS
Status: COMPLETED | OUTPATIENT
Start: 2020-07-27 | End: 2020-07-27

## 2020-07-27 RX ORDER — ONDANSETRON 2 MG/ML
4 INJECTION INTRAMUSCULAR; INTRAVENOUS
Status: COMPLETED | OUTPATIENT
Start: 2020-07-27 | End: 2020-07-27

## 2020-07-27 RX ADMIN — IOHEXOL 100 ML: 350 INJECTION, SOLUTION INTRAVENOUS at 01:07

## 2020-07-27 RX ADMIN — MORPHINE SULFATE 4 MG: 4 INJECTION INTRAVENOUS at 12:07

## 2020-07-27 RX ADMIN — FAMOTIDINE 20 MG: 10 INJECTION INTRAVENOUS at 12:07

## 2020-07-27 RX ADMIN — ONDANSETRON 4 MG: 2 INJECTION INTRAMUSCULAR; INTRAVENOUS at 12:07

## 2020-07-27 NOTE — ED PROVIDER NOTES
Encounter Date: 7/26/2020     History     Chief Complaint   Patient presents with    Abdominal Pain     Pt c/o epigastric pain with 2-3 episodes of vomiting that began today. Also reports lower back. Denies fever or diarrhea.     56y/o AA woman, mhx HTN, prior CVA with residual L facial droop, s/p cholecystectomy, cervical CA s/p hysterectomy and radiation therapy complicated by bladder fibrosis with bilateral hydronephrosis s/p bilateral nephrostomy tube placement, here for acute onset crampy non-radiating periumbilical abdominal pain.  Patient had Popeyes for dinner and shortly thereafter (approx 8PM) developed crampy non-radiating periumbilical abdominal pain associated with lower back muscle spasms.  Symptoms also associated with NBNB emesis x3-4 episodes with mild relief in abdominal pain after emesis.  Denies fever, cough/sore throat, CP/SOB, nausea, diarrhea, constipation, hematochezia/melena, dysuria, hematuria, flank pain, and bloody/purulent output from nephrostomy tubes.  Reports she typically gets similar abdominal pain with lower back muscle spasms after eating greasy foods, but symptoms typically resolve after vomiting 1-2 times.  Prompted to come in for evaluation as she has vomited multiple times (most recently in ED triage) without significant symptomatic relief.  Trialed home meds (omeprazole, tramadol) but threw up shortly thereafter.  Of note, patient has had her current nephrostomy tubes in place since last month; reports urine output is clear yellow and without blood or purulence.  Denies recent falls/injuries; no accidental tugging or pulling on nephrostomy tubes.  Not on anticoagulation.    Patient is due for scheduled colonic conduit for urinary diversion with CRS and Urology in 2 days.        Review of patient's allergies indicates:   Allergen Reactions    Bee sting [allergen ext-venom-honey bee]      Rash      Grass pollen-bermuda, standard      rash     Past Medical History:    Diagnosis Date    Anxiety     Cervical cancer 2014    Chronic back pain     Depression     Diarrhea due to malabsorption 11/14/2018    Fibromyalgia     GERD (gastroesophageal reflux disease)     Hiatal hernia 2014    History of cervical cancer 10/11/2018    Hx of psychiatric care     Cymbalta, trazodone    Hypertension     Hypomagnesemia 11/21/2018    Lactose intolerance     Metastatic squamous cell carcinoma to lymph node 10/11/2018    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 drug 11/14/2018    Osteoarthritis of back     Psychiatric problem     Stroke 1972     Past Surgical History:   Procedure Laterality Date    BILATERAL OOPHORECTOMY  2015    CHOLECYSTECTOMY  11/09/2016    Done at [facility], path showed chronic cholecystitis and gallstones    cold knife conization  2014    COLONOSCOPY  2014    [facility]    COLONOSCOPY N/A 5/18/2018    Procedure: COLONOSCOPY;  Surgeon: Aredn Gutiérrez MD;  Location: [facility] (4TH FLR);  Service: Endoscopy;  Laterality: N/A;    CYSTOSCOPY WITH URETEROSCOPY, RETROGRADE PYELOGRAPHY, AND INSERTION OF STENT Bilateral 3/21/2020    Procedure: CYSTOSCOPY, WITH RETROGRADE PYELOGRAM,;  Surgeon: Leslie Balbuena MD;  Location: [facility] OR 1ST FLR;  Service: Urology;  Laterality: Bilateral;    ESOPHAGOGASTRODUODENOSCOPY  2014    HYSTERECTOMY  2014    [facility] for cervical cancer    OOPHORECTOMY      RETROPERITONEAL LYMPHADENECTOMY Right 9/17/2018    Procedure: LYMPHADENECTOMY, RETROPERITONEUM;  Surgeon: Sebas Reed MD;  Location: [facility] OR 2ND FLR;  Service: General;  Laterality: Right;  ILIAC     Family History   Problem Relation Age of Onset    Heart disease Sister     Heart disease Maternal Grandmother     Colon cancer Father     Esophageal cancer Father     Cancer Father         Lung-smoker    Cancer Mother         Cervical    Cervical cancer Mother     Breast cancer Maternal Aunt     Suicide Daughter         jumped  from parking structure    Drug abuse Daughter     Drug abuse Daughter     Rectal cancer Neg Hx     Stomach cancer Neg Hx     Crohn's disease Neg Hx     Ulcerative colitis Neg Hx     Diabetes Neg Hx     Hypertension Neg Hx      Social History     Tobacco Use    Smoking status: Former Smoker    Smokeless tobacco: Never Used   Substance Use Topics    Alcohol use: No     Alcohol/week: 0.0 standard drinks    Drug use: No     Review of Systems   Constitutional: Negative for chills and fever.   HENT: Negative for congestion, sore throat and trouble swallowing.    Eyes: Negative for pain and redness.   Respiratory: Negative for cough and shortness of breath.    Cardiovascular: Positive for leg swelling (Chronic BLE swelling that reduces in the evenings with elevation). Negative for chest pain.   Gastrointestinal: Positive for abdominal pain and vomiting. Negative for abdominal distention, blood in stool, constipation, diarrhea and nausea.   Genitourinary: Negative for dysuria and hematuria.   Musculoskeletal: Positive for back pain (Lower back muscle spasms). Negative for gait problem.   Skin: Negative for rash and wound.   Neurological: Negative for syncope, numbness and headaches.   Psychiatric/Behavioral: Negative for agitation and confusion.     Physical Exam     Initial Vitals [07/26/20 2317]   BP Pulse Resp Temp SpO2   133/67 101 16 98.6 °F (37 °C) 99 %      MAP       --         Physical Exam    Nursing note and vitals reviewed.  Constitutional: She appears well-developed and well-nourished. She is not diaphoretic. No distress.   HENT:   Head: Normocephalic and atraumatic.   Right Ear: External ear normal.   Left Ear: External ear normal.   Nose: Nose normal.   Mouth/Throat: Oropharynx is clear and moist.   Eyes: Conjunctivae and EOM are normal. Pupils are equal, round, and reactive to light. Right eye exhibits no discharge. Left eye exhibits no discharge. No scleral icterus.   Neck: Normal range of  motion. Neck supple. No tracheal deviation present.   Cardiovascular: Regular rhythm, normal heart sounds and intact distal pulses. Exam reveals no gallop and no friction rub.    No murmur heard.  Mild tachycardia (-110)   Pulmonary/Chest: Breath sounds normal. No respiratory distress. She has no wheezes. She has no rhonchi. She has no rales.   Abdominal: Soft. Bowel sounds are normal. She exhibits no distension. There is abdominal tenderness in the right lower quadrant and periumbilical area. There is tenderness at McBurney's point. There is no rebound, no guarding, no CVA tenderness and negative Kendrick's sign.   Bilateral percutaneous nephrostomy tubes; c/d/i; non-tender and without surrounding erythema, edema, or discharge.  Yellow output in bilateral tubes without blood or purulence.   Musculoskeletal: Normal range of motion. Tenderness (Mild TTP to the lumbar paraspinal regions (R>L)) and edema (2+ pitting edema to BLE extending up to mid-calves) present.   Neurological: She is alert and oriented to person, place, and time. She has normal strength. No sensory deficit. GCS score is 15. GCS eye subscore is 4. GCS verbal subscore is 5. GCS motor subscore is 6.   A&Ox4.  L facial droop; no dysarthria or aphasia.  Moving all extremities symmetrically and without abnormal tone; coordination WNL.  SILT grossly.   Skin: Skin is warm and dry. No rash noted. No erythema.       ED Course   Procedures  Labs Reviewed   CBC W/ AUTO DIFFERENTIAL - Abnormal; Notable for the following components:       Result Value    Hemoglobin 11.5 (*)     Mean Corpuscular Hemoglobin 26.7 (*)     Mean Corpuscular Hemoglobin Conc 29.6 (*)     All other components within normal limits   COMPREHENSIVE METABOLIC PANEL - Abnormal; Notable for the following components:    Glucose 117 (*)     eGFR if  52.6 (*)     eGFR if non  45.6 (*)     All other components within normal limits   LIPASE   MAGNESIUM     EKG  Readings: (Independently Interpreted)   Normal sinus rhythm at a rate of 102 with rightward axis.  No significant ST abnormalities.  Intervals are unremarkable.       Imaging Results          CT Abdomen Pelvis With Contrast (Final result)  Result time 07/27/20 02:04:17    Final result by Sebas Bermeo MD (07/27/20 02:04:17)                 Impression:      Bilateral nephrostomy tubes in expected positions with interval decompression of the bilateral renal collecting systems and no evidence of hydronephrosis.    Bilateral periureteral inflammatory changes and possible retroperitoneal fibrosis, overall similar in appearance to the prior study.  Suggest correlation with urinalysis if there is concern for urinary tract infection.    Status-post hysterectomy and bilateral oophorectomy.    Small to moderate volume free fluid within the lower pelvis, similar to prior exam.    Normal-appearing appendix visualized without evidence of appendicitis.    Electronically signed by resident: Tony Gordon  Date:    07/27/2020  Time:    01:36    Electronically signed by: Sebas Bermeo MD  Date:    07/27/2020  Time:    02:04             Narrative:    EXAMINATION:  CT ABDOMEN PELVIS WITH CONTRAST    CLINICAL HISTORY:  RLQ abdominal pain, appendicitis suspected (Age => 12y);    TECHNIQUE:  Low dose axial images, sagittal and coronal reformations were obtained from the lung bases to the pubic symphysis following the IV administration of 100 mL of Omnipaque 350 .  Oral contrast was not administered.    COMPARISON:  IR nephrostomy tube 06/11/2020; CT abdomen pelvis 06/10/2020, 05/21/2020, 04/19/2020    FINDINGS:  Abdomen:    - Lower thorax:Unremarkable.    - Lung bases: Lungs are clear and symmetrically expanded.  No infiltrates and no nodules.  No pleural fluid.    - Liver: Liver is normal in size.  Geographic region of decreased attenuation in the left hepatic lobe near the falciform ligament likely representing focal fat  deposition.    - Gallbladder: Surgically absent.    - Bile Ducts: No evidence of intra or extra hepatic biliary ductal dilation.    - Spleen: Normal size and attenuation.    - Kidneys: Kidneys are normal in size and lie in expected location.  There are bilateral percutaneous nephrostomy tubes which both terminate in the expected locations of the renal collecting systems.  There is no hydroureteronephrosis.  Simple cyst within the inferior pole of the left kidney.  No enhancing renal masses.  Kidneys demonstrate normal symmetric contrast enhancement bilaterally.  There is marked ureteral wall enhancement with periureteral inflammatory changes and abnormal soft tissue in the retroperitoneum, similar in appearance to the prior study.    - Adrenals: Unremarkable.    - Pancreas: No mass or peripancreatic fat stranding.    - Retroperitoneum:  No significant adenopathy.    - Vascular: No abdominal aortic aneurysm.    - Abdominal wall:  Percutaneous nephrostomy tubes coursing to the posterior subcutaneous soft tissues without evidence of associated fat stranding, soft tissue thickening, or organized fluid collections associated with the catheter tracts.    Pelvis:    The uterus and ovaries are surgically absent.  No pelvic mass or adenopathy.  Small to moderate volume free fluid in the lower pelvis, similar to the prior exam.  The bladder is decompressed and incompletely evaluated.    Bowel/Mesentery:    Moderate volume of stool throughout the colon, predominantly within the cecum and right hemicolon.  No evidence of bowel obstruction or inflammation.  Normal-appearing appendix visualized in the right lower quadrant without evidence of periappendiceal inflammation.  No significant mesenteric adenopathy.    Bones:  No acute osseous abnormality and no suspicious lytic or blastic lesion.                                 Medical Decision Making:   History:   Old Medical Records: I decided to obtain old medical records.  Old  Records Summarized: records from clinic visits.  Initial Assessment:   56y/o AA woman, mhx HTN, prior CVA with residual L facial droop, s/p cholecystectomy, cervical CA s/p hysterectomy and radiation therapy complicated by bladder fibrosis with bilateral hydronephrosis s/p bilateral nephrostomy tube placement, here for acute onset crampy non-radiating periumbilical abdominal pain at 8PM after eating Popeyes.  Associated with non-radiating lower back muscle spasms and NBNB emesis x3-4 episodes with mild relief in abdominal pain after emesis.  Denies fever, cough/sore throat, CP/SOB, N/D/C, hematochezia/melena, dysuria, hematuria, flank pain, and bloody/purulent output from nephrostomy tubes.  Typically gets similar abdominal pain with lower back muscle spasms after eating greasy foods, but symptoms typically resolve after vomiting 1-2 times.  Prompted to come in for evaluation after vomiting multiple times (most recently in ED triage) without significant symptomatic relief.  Trialed home meds but threw up shortly thereafter.  Current nephrostomy tubes in place since last month. Denies recent falls/injuries; no accidental tugging or pulling on nephrostomy tubes.  Not on anticoagulation.  Due for scheduled colonic conduit for urinary diversion with CRS and Urology in 2 days.  Afebrile, mildly tachy (100-110) but HDS, without respiratory distress, SpO2 99%RA.  Well-appearing AA woman, non-toxic appearing, A&Ox4, in NAD.  Exam significant for moderate tenderness on deep palpation to the periumbilical region, RLQ, and McBurney's point without rebound/guarding; no CVA tenderness, flank pain, or Kendrick's tenderness.  Bilateral nephrostomy tubes c/d/i without bloody or purulent output.  Mild TTP to the lumbar paraspinal regions.  Remainder of exam non-contributory.  Chronic L facial droop.  BLE edema to the mid-calves.  Differential Diagnosis:   Ddx including but not limited to gastroenteritis, dyspepsia, GERD/PUD,  pancreatitis, colitis, appendicitis, nephrostomy tube dislocation/infection/malfunction, pyelo, metabolic derangements, perforated viscus, sepsis, retroperitoneal bleed, sprain/strain  Clinical Tests:   Lab Tests: Ordered and Reviewed  The following lab test(s) were unremarkable: CBC, CMP and Lipase  Radiological Study: Ordered and Reviewed  Medical Tests: Ordered and Reviewed  ED Management:  Patient well-appearing, non-toxic, without surgical abdomen.  Likely gastroenteritis or GERD; lower suspicion for intraabdominal infection such as pancreatitis, colitis.  Cannot rule-out appendicitis in setting of location of pain.  S/p cholecystectomy and hysterectomy.  Non-surgical abdomen at this time; unlikely to be perforated viscus.  Unlikely to be retroperitoneal bleed; no recent falls/injuries and not on AC.  However, in setting of complicated Gyn and  history and nephrostomy tube placement, cannot rule-out  complication.  Will pursue abdominal work-up including CT A/P.  No urinary symptoms at this time, and patient is on prophylactic antibiotics; will hold UA at this time.  Supportive management initiated with IV famotidine, IV zofran, and IV morphine.   Dispo pending labs & imaging.  Alisa Ron MD      Labs grossly WNL.  CT A/P stable compared to priors; no evidence for appendicitis or emergent surgical intervention.  Patient with significant symptomatic improvement following supportive management; tolerating PO.  Suspected indigestion.  Patient counseled on lifestyle modifications including dietary changes.  Counseled on supportive management for her abd pain and her lower back muscle spasms (including use of heating pads and OTC tylenol).  Prescribed PRN famotidine and ODT zofran.  Discharged home in stable condition.  ER return precautions given.  Alisa Ron MD  [employer] IM/EM PGY-3  7/27/2020 @ 3:11 AM  [facility] IM/[facility] PGY-3  7/27/2020 @ 12:30 AM                   ED Course as of Jul 27 0309   Mon Jul 27,  2020 0051 Stable; improved from priors   Hemoglobin(!): 11.5 [TN]   0058 Lipase: 24 [TN]   0212 Bilateral nephrostomy tubes in expected positions with interval decompression of the bilateral renal collecting systems and no evidence of hydronephrosis.  Bilateral periureteral inflammatory changes and possible retroperitoneal fibrosis, overall similar in appearance to the prior study.  Suggest correlation with urinalysis if there is concern for urinary tract infection.   Status-post hysterectomy and bilateral oophorectomy.  Small to moderate volume free fluid within the lower pelvis, similar to prior exam.  Normal-appearing appendix visualized without evidence of appendicitis.   CT Abdomen Pelvis With Contrast [TN]      ED Course User Index  [TN] Alisa Ron MD        Clinical Impression:       ICD-10-CM ICD-9-CM   1. Indigestion  K30 536.8   2. Emesis  R11.10 787.03                Alisa Ron MD  Resident  07/27/20 0312

## 2020-07-27 NOTE — ED TRIAGE NOTES
"Patient c/o of epigastric pain after eating "grease" around 4pm today with 3-4 episodes of vomiting afterwards. Also reports "muscle spasm" in lower back. Feels as though the abdominal pain radiates to her back. Denies CP, SOB, diarrhea.   "

## 2020-07-27 NOTE — DISCHARGE INSTRUCTIONS
You were evaluated in the emergency department for your abdominal pain. Your work-up was grossly within normal limits, and the CAT scan of your abdomen/pelvis was stable compared to your prior CAT scans and did not show any acute infections or any pathology requiring emergent surgery.      Please refrain from eating fatty, greasy, or fried foods.  Avoid lying down or sleeping within 1 hour of eating a meal.      You have been prescribed famotidine (aka, Pepcid) for heartburn and ondansetron (aka, Zofran) for nausea.  Please take as prescribed.  Please ensure that you stay appropriately hydrated.

## 2020-07-28 ENCOUNTER — HOSPITAL ENCOUNTER (OUTPATIENT)
Facility: HOSPITAL | Age: 55
Discharge: HOME | End: 2020-07-28
Attending: COLON & RECTAL SURGERY | Admitting: COLON & RECTAL SURGERY
Payer: MEDICAID

## 2020-07-28 ENCOUNTER — ANESTHESIA (OUTPATIENT)
Dept: ENDOSCOPY | Facility: HOSPITAL | Age: 55
End: 2020-07-28
Payer: MEDICAID

## 2020-07-28 VITALS
HEIGHT: 69 IN | WEIGHT: 211 LBS | HEART RATE: 79 BPM | SYSTOLIC BLOOD PRESSURE: 124 MMHG | RESPIRATION RATE: 14 BRPM | DIASTOLIC BLOOD PRESSURE: 72 MMHG | TEMPERATURE: 98 F | OXYGEN SATURATION: 99 % | BODY MASS INDEX: 31.25 KG/M2

## 2020-07-28 DIAGNOSIS — Z80.0 FAMILY HISTORY OF COLON CANCER: Primary | ICD-10-CM

## 2020-07-28 PROCEDURE — G0105 COLORECTAL SCRN; HI RISK IND: HCPCS | Performed by: COLON & RECTAL SURGERY

## 2020-07-28 PROCEDURE — E9220 PRA ENDO ANESTHESIA: HCPCS | Mod: ,,, | Performed by: NURSE ANESTHETIST, CERTIFIED REGISTERED

## 2020-07-28 PROCEDURE — 00812 ANES LWR INTST SCR COLSC: CPT | Performed by: COLON & RECTAL SURGERY

## 2020-07-28 PROCEDURE — 25000003 PHARM REV CODE 250: Performed by: COLON & RECTAL SURGERY

## 2020-07-28 PROCEDURE — 63600175 PHARM REV CODE 636 W HCPCS: Performed by: NURSE ANESTHETIST, CERTIFIED REGISTERED

## 2020-07-28 PROCEDURE — E9220 PRA ENDO ANESTHESIA: ICD-10-PCS | Mod: ,,, | Performed by: NURSE ANESTHETIST, CERTIFIED REGISTERED

## 2020-07-28 PROCEDURE — 37000009 HC ANESTHESIA EA ADD 15 MINS: Performed by: COLON & RECTAL SURGERY

## 2020-07-28 PROCEDURE — 45378 PR COLONOSCOPY,DIAGNOSTIC: ICD-10-PCS | Mod: ,,, | Performed by: COLON & RECTAL SURGERY

## 2020-07-28 PROCEDURE — 45378 DIAGNOSTIC COLONOSCOPY: CPT | Mod: ,,, | Performed by: COLON & RECTAL SURGERY

## 2020-07-28 PROCEDURE — 37000008 HC ANESTHESIA 1ST 15 MINUTES: Performed by: COLON & RECTAL SURGERY

## 2020-07-28 RX ORDER — LIDOCAINE HCL/PF 100 MG/5ML
SYRINGE (ML) INTRAVENOUS
Status: DISCONTINUED | OUTPATIENT
Start: 2020-07-28 | End: 2020-07-28

## 2020-07-28 RX ORDER — PROPOFOL 10 MG/ML
VIAL (ML) INTRAVENOUS
Status: DISCONTINUED | OUTPATIENT
Start: 2020-07-28 | End: 2020-07-28

## 2020-07-28 RX ORDER — PROPOFOL 10 MG/ML
VIAL (ML) INTRAVENOUS CONTINUOUS PRN
Status: DISCONTINUED | OUTPATIENT
Start: 2020-07-28 | End: 2020-07-28

## 2020-07-28 RX ORDER — SODIUM CHLORIDE 9 MG/ML
INJECTION, SOLUTION INTRAVENOUS CONTINUOUS
Status: DISCONTINUED | OUTPATIENT
Start: 2020-07-28 | End: 2020-07-28 | Stop reason: HOSPADM

## 2020-07-28 RX ADMIN — SODIUM CHLORIDE: 0.9 INJECTION, SOLUTION INTRAVENOUS at 10:07

## 2020-07-28 RX ADMIN — PROPOFOL 60 MG: 10 INJECTION, EMULSION INTRAVENOUS at 11:07

## 2020-07-28 RX ADMIN — PROPOFOL 50 MG: 10 INJECTION, EMULSION INTRAVENOUS at 11:07

## 2020-07-28 RX ADMIN — PROPOFOL 125 MCG/KG/MIN: 10 INJECTION, EMULSION INTRAVENOUS at 11:07

## 2020-07-28 RX ADMIN — Medication 60 MG: at 11:07

## 2020-07-28 RX ADMIN — PROPOFOL 150 MCG/KG/MIN: 10 INJECTION, EMULSION INTRAVENOUS at 11:07

## 2020-07-28 NOTE — H&P
Procedure : Colonoscopy    Indication(s):  History of colon polyps  Family hx of CRC - father (age?)    Review of patient's allergies indicates:   Allergen Reactions    Bee sting [allergen ext-venom-honey bee]      Rash      Grass pollen-bermuda, standard      rash       Past Medical History:   Diagnosis Date    Anxiety     Cervical cancer 2014    Chronic back pain     Depression     Diarrhea due to malabsorption 11/14/2018    Fibromyalgia     GERD (gastroesophageal reflux disease)     Hiatal hernia 2014    History of cervical cancer 10/11/2018    Hx of psychiatric care     Cymbalta, trazodone    Hypertension     Hypomagnesemia 11/21/2018    Lactose intolerance     Metastatic squamous cell carcinoma to lymph node 10/11/2018    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 drug 11/14/2018    Osteoarthritis of back     Psychiatric problem     Stroke 1972       Prior to Admission medications    Medication Sig Start Date End Date Taking? Authorizing Provider   amLODIPine (NORVASC) 5 MG tablet Take 5 mg by mouth. 4/30/20 4/30/21 Yes Historical Provider, MD   cephALEXin (KEFLEX) 500 MG capsule Take 500 mg by mouth. 4/30/20  Yes Historical Provider, MD   docusate sodium (COLACE) 100 MG capsule Take 1 capsule (100 mg total) by mouth 2 (two) times daily as needed for Constipation. 3/28/20  Yes Cynthia Díaz MD   gabapentin (NEURONTIN) 300 MG capsule Take 1 capsule (300 mg total) by mouth 3 (three) times daily. 9/3/19  Yes Refugio Benjamin MD   ketoconazole (NIZORAL) 2 % cream Apply topically once daily. Apply to affected area 7/20/20  Yes Audrey Mustafa MD   lisinopril 10 MG tablet Take 1 tablet (10 mg total) by mouth once daily. 9/19/19 9/18/20 Yes Audrey Mustafa MD   omeprazole (PRILOSEC) 40 MG capsule Take 1 capsule (40 mg total) by mouth once daily. 7/10/20 7/10/21 Yes Audrey Mustafa MD   ondansetron (ZOFRAN-ODT) 4 MG TbDL Take 1 tablet (4 mg total) by mouth every 6 (six)  hours as needed (nausea). 7/27/20 8/1/20 Yes Alisa Ron MD   oxyCODONE (ROXICODONE) 5 MG immediate release tablet Take 1 tablet (5 mg total) by mouth every 8 (eight) hours as needed for Pain. 6/12/20  Yes Eddie Bashir DO   traZODone (DESYREL) 50 MG tablet TAKE 1 TABLET (50 MG TOTAL) BY MOUTH EVERY EVENING. 11/13/19 11/12/20 Yes Audrey Mustafa MD   famotidine (PEPCID) 20 MG tablet Take 1 tablet (20 mg total) by mouth 2 (two) times daily as needed for Heartburn. 7/27/20 7/27/21  Alisa Ron MD       Sedation Problems: NO    Family History   Problem Relation Age of Onset    Heart disease Sister     Heart disease Maternal Grandmother     Colon cancer Father     Esophageal cancer Father     Cancer Father         Lung-smoker    Cancer Mother         Cervical    Cervical cancer Mother     Breast cancer Maternal Aunt     Suicide Daughter         jumped from parking structure    Drug abuse Daughter     Drug abuse Daughter     Rectal cancer Neg Hx     Stomach cancer Neg Hx     Crohn's disease Neg Hx     Ulcerative colitis Neg Hx     Diabetes Neg Hx     Hypertension Neg Hx        Fam Hx of Sedation Problems: NO    Social History     Socioeconomic History    Marital status:      Spouse name: Hammad    Number of children: 2    Years of education: Not on file    Highest education level: Not on file   Occupational History    Not on file   Social Needs    Financial resource strain: Not on file    Food insecurity     Worry: Not on file     Inability: Not on file    Transportation needs     Medical: Not on file     Non-medical: Not on file   Tobacco Use    Smoking status: Former Smoker    Smokeless tobacco: Never Used   Substance and Sexual Activity    Alcohol use: No     Alcohol/week: 0.0 standard drinks    Drug use: No    Sexual activity: Yes     Partners: Male     Birth control/protection: None     Comment:  19 years since 1999   Lifestyle    Physical  activity     Days per week: Not on file     Minutes per session: Not on file    Stress: Not on file   Relationships    Social connections     Talks on phone: Not on file     Gets together: Not on file     Attends Sabianist service: Not on file     Active member of club or organization: Not on file     Attends meetings of clubs or organizations: Not on file     Relationship status: Not on file   Other Topics Concern    Patient feels they ought to cut down on drinking/drug use Not Asked    Patient annoyed by others criticizing their drinking/drug use Not Asked    Patient has felt bad or guilty about drinking/drug use Not Asked    Patient has had a drink/used drugs as an eye opener in the AM Not Asked   Social History Narrative    , twin daughters (1  2018), disabled due to childhood stroke, [employer]       Review of Systems -     Respiratory ROS: no cough, shortness of breath, or wheezing  Cardiovascular ROS: no chest pain or dyspnea on exertion  Gastrointestinal ROS: no abdominal pain, change in bowel habits, or black or bloody stools  Musculoskeletal ROS: negative  Neurological ROS: no TIA or stroke symptoms        Physical Exam:  General: no distress  Head: normocephalic  Airway:  normal oropharynx, airway normal  Neck: supple, symmetrical, trachea midline  Lungs:  normal respiratory effort  Heart: regular rate and rhythm  Abdomen: soft, non-tender non-distented; bowel sounds normal; no masses,  no organomegaly  Extremities: no cyanosis or edema, or clubbing       Deep Sedation: Mallampati Score per anesthesia     SedationPlan :Moderate     ASA : III    Patient is medically cleared for anesthesia.    Anesthesia/Surgery risks, benefits and alternative options discussed and understood by patient/family.

## 2020-07-28 NOTE — DISCHARGE INSTRUCTIONS
Colonoscopy     A camera attached to a flexible tube with a viewing lens is used to take video pictures.     Colonoscopy is a test to view the inside of your lower digestive tract (colon and rectum). Sometimes it can show the last part of the small intestine (ileum). During the test, small pieces of tissue may be removed for testing. This is called a biopsy. Small growths, such as polyps, may also be removed.   Why is colonoscopy done?  The test is done to help look for colon cancer. And it can help find the source of abdominal pain, bleeding, and changes in bowel habits. It may be needed once a year, depending on factors such as your:  · Age  · Health history  · Family health history  · Symptoms  · Results from any prior colonoscopy  Risks and possible complications  These include:  · Bleeding               · A puncture or tear in the colon   · Risks of anesthesia  · A cancer lesion not being seen  Getting ready   To prepare for the test:  · Talk with your healthcare provider about the risks of the test (see below). Also ask your healthcare provider about alternatives to the test.  · Tell your healthcare provider about any medicines you take. Also tell him or her about any health conditions you may have.  · Make sure your rectum and colon are empty for the test. Follow the diet and bowel prep instructions exactly. If you dont, the test may need to be rescheduled.  · Plan for a friend or family member to drive you home after the test.     Colonoscopy provides an inside view of the entire colon.     You may discuss the results with your doctor right away or at a future visit.  During the test   The test is usually done in the hospital on an outpatient basis. This means you go home the same day. The procedure takes about 30 minutes. During that time:  · You are given relaxing (sedating) medicine through an IV line. You may be drowsy, or fully asleep.  · The healthcare provider will first give you a physical exam to  check for anal and rectal problems.  · Then the anus is lubricated and the scope inserted.  · If you are awake, you may have a feeling similar to needing to have a bowel movement. You may also feel pressure as air is pumped into the colon. Its OK to pass gas during the procedure.  · Biopsy, polyp removal, or other treatments may be done during the test.  After the test   You may have gas right after the test. It can help to try to pass it to help prevent later bloating. Your healthcare provider may discuss the results with you right away. Or you may need to schedule a follow-up visit to talk about the results. After the test, you can go back to your normal eating and other activities. You may be tired from the sedation and need to rest for a few hours.  Date Last Reviewed: 11/1/2016 © 2000-2017 [employer]. [address] [place] [address], [place] [address]. All rights reserved. This information is not intended as a substitute for professional medical care. Always follow your healthcare professional's instructions.

## 2020-07-28 NOTE — PLAN OF CARE
Discharge instructions given. Pt verbalized understanding. No c/o. No distress noted. Pt assisted off unit via w/c with staff.

## 2020-07-28 NOTE — [employer] PATIENT INSTRUCTIONS
Discharge Summary/Instructions after an Endoscopic Procedure  Patient Name: Edita Riley  Patient MRN: [id number]  Patient YOB: 1965 Tuesday, July 28, 2020  Hammad Reynolds MD  RESTRICTIONS:  During your procedure today, you received medications for sedation.  These   medications may affect your judgment, balance and coordination.  Therefore,   for 24 hours, you have the following restrictions:   - DO NOT drive a car, operate machinery, make legal/financial decisions,   sign important papers or drink alcohol.    ACTIVITY:  Today: no heavy lifting, straining or running due to procedural   sedation/anesthesia.  The following day: return to full activity including work.  DIET:  Eat and drink normally unless instructed otherwise.     TREATMENT FOR COMMON SIDE EFFECTS:  - Mild abdominal pain, nausea, belching, bloating or excessive gas:  rest,   eat lightly and use a heating pad.  - Sore Throat: treat with throat lozenges and/or gargle with warm salt   water.  - Because air was used during the procedure, expelling large amounts of air   from your rectum or belching is normal.  - If a bowel prep was taken, you may not have a bowel movement for 1-3 days.    This is normal.  SYMPTOMS TO WATCH FOR AND REPORT TO YOUR PHYSICIAN:  1. Abdominal pain or bloating, other than gas cramps.  2. Chest pain.  3. Back pain.  4. Signs of infection such as: chills or fever occurring within 24 hours   after the procedure.  5. Rectal bleeding, which would show as bright red, maroon, or black stools.   (A tablespoon of blood from the rectum is not serious, especially if   hemorrhoids are present.)  6. Vomiting.  7. Weakness or dizziness.  GO DIRECTLY TO THE NEAREST EMERGENCY ROOM IF YOU HAVE ANY OF THE FOLLOWING:      Difficulty breathing              Chills and/or fever over 101 F   Persistent vomiting and/or vomiting blood   Severe abdominal pain   Severe chest pain   Black, tarry stools   Bleeding- more than one  tablespoon   Any other symptom or condition that you feel may need urgent attention  Your doctor recommends these additional instructions:  If any biopsies were taken, your doctors clinic will contact you in 1 to 2   weeks with any results.  - Discharge patient to home.   - Resume previous diet.   - Continue present medications.   - Patient has a contact number available for emergencies.  The signs and   symptoms of potential delayed complications were discussed with the   patient.  Return to normal activities tomorrow.  Written discharge   instructions were provided to the patient.   - Repeat colonoscopy in 5 years for screening purposes.  For questions, problems or results please call your physician - Hammad Reynolds MD at Work:  [phone].  [facility], EMERGENCY ROOM PHONE NUMBER: [phone]  IF A COMPLICATION OR EMERGENCY SITUATION ARISES AND YOU ARE UNABLE TO REACH   YOUR PHYSICIAN - GO DIRECTLY TO THE EMERGENCY ROOM.  Hammad Reynolds MD  7/28/2020 11:28:34 AM  This report has been verified and signed electronically.  [employer]

## 2020-07-28 NOTE — TRANSFER OF CARE
"Anesthesia Transfer of Care Note    Patient: Edita Riley    Procedure(s) Performed: Procedure(s) (LRB):  COLONOSCOPY (N/A)    Patient location: PACU    Anesthesia Type: general    Transport from OR: Transported from OR on 6-10 L/min O2 by face mask with adequate spontaneous ventilation    Post pain: adequate analgesia    Post assessment: no apparent anesthetic complications and tolerated procedure well    Post vital signs: stable    Level of consciousness: awake and alert    Nausea/Vomiting: no nausea/vomiting    Complications: none    Transfer of care protocol was followed      Last vitals:   Visit Vitals  /70 (BP Location: Left arm, Patient Position: Lying)   Pulse 87   Temp 36.5 °C (97.7 °F)   Resp 16   Ht 5' 9" (1.753 m)   Wt 95.7 kg (211 lb)   LMP 06/08/2014 (Approximate)   SpO2 100%   Breastfeeding No   BMI 31.16 kg/m²     "

## 2020-07-28 NOTE — ANESTHESIA PREPROCEDURE EVALUATION
07/28/2020  Edita Riley is a 55 y.o., female.  Past Medical History:   Diagnosis Date    Anxiety     Cervical cancer 2014    Chronic back pain     Depression     Diarrhea due to malabsorption 11/14/2018    Fibromyalgia     GERD (gastroesophageal reflux disease)     Hiatal hernia 2014    History of cervical cancer 10/11/2018    Hx of psychiatric care     Cymbalta, trazodone    Hypertension     Hypomagnesemia 11/21/2018    Lactose intolerance     Metastatic squamous cell carcinoma to lymph node 10/11/2018    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 drug 11/14/2018    Osteoarthritis of back     Psychiatric problem     Stroke 1972     Past Surgical History:   Procedure Laterality Date    BILATERAL OOPHORECTOMY  2015    CHOLECYSTECTOMY  11/09/2016    Done at [facility], path showed chronic cholecystitis and gallstones    cold knife conization  2014    COLONOSCOPY  2014    [facility]    COLONOSCOPY N/A 5/18/2018    Procedure: COLONOSCOPY;  Surgeon: Arden Gutiérrez MD;  Location: [facility] (4TH FLR);  Service: Endoscopy;  Laterality: N/A;    CYSTOSCOPY WITH URETEROSCOPY, RETROGRADE PYELOGRAPHY, AND INSERTION OF STENT Bilateral 3/21/2020    Procedure: CYSTOSCOPY, WITH RETROGRADE PYELOGRAM,;  Surgeon: Leslie Balbuena MD;  [facility];  Service: Urology;  Laterality: Bilateral;    ESOPHAGOGASTRODUODENOSCOPY  2014    HYSTERECTOMY  2014    [facility] for cervical cancer    OOPHORECTOMY      RETROPERITONEAL LYMPHADENECTOMY Right 9/17/2018    Procedure: LYMPHADENECTOMY, RETROPERITONEUM;  Surgeon: Sebas Reed MD;  Location: [facility] OR 2ND FLR;  Service: General;  Laterality: Right;  ILIAC       Anesthesia Evaluation    I have reviewed the Patient Summary Reports.    I have reviewed the Nursing Notes. I have reviewed the NPO Status.   I  have reviewed the Medications.     Review of Systems  Anesthesia Hx:  No problems with previous Anesthesia  Denies Family Hx of Anesthesia complications.   Denies Personal Hx of Anesthesia complications.   Hematology/Oncology:         -- Cancer in past history: Other (see Oncology comments) radiation    Cardiovascular:   Hypertension    Renal/:   Chronic Renal Disease (Bilateral nephrostomy secondary to cancer)    Hepatic/GI:   Hiatal Hernia, GERD    Musculoskeletal:   Arthritis     OB/GYN/PEDS:  Cervical cancer    Neurological:   CVA (Left sided weakness), residual symptoms Neuromuscular Disease, (Fibromyalgia)    Psych:   Psychiatric History anxiety depression          Physical Exam  General:  Well nourished      Dental:  Dental Findings: In tact   Chest/Lungs:  Chest/Lungs Findings: Clear to auscultation, Normal Respiratory Rate     Heart/Vascular:  Heart Findings: Rate: Normal  Sounds: Normal             Anesthesia Plan  Type of Anesthesia, risks & benefits discussed:  Anesthesia Type:  general  Patient's Preference:   Intra-op Monitoring Plan: standard ASA monitors  Intra-op Monitoring Plan Comments:   Post Op Pain Control Plan:   Post Op Pain Control Plan Comments:   Induction:   IV  Beta Blocker:  Patient is not currently on a Beta-Blocker (No further documentation required).       Informed Consent: Patient understands risks and agrees with Anesthesia plan.  Questions answered. Anesthesia consent signed with patient.  ASA Score: 3     Day of Surgery Review of History & Physical:    H&P update referred to the provider.         Ready For Surgery From Anesthesia Perspective.

## 2020-07-28 NOTE — ANESTHESIA POSTPROCEDURE EVALUATION
Anesthesia Post Evaluation    [facility]    Procedure(s) Performed: Procedure(s) (LRB):  COLONOSCOPY (N/A)    Final Anesthesia Type: general    Patient location during evaluation: GI PACU  Patient participation: Yes- Able to Participate  Level of consciousness: awake and alert  Post-procedure vital signs: reviewed and stable  Pain management: adequate  Airway patency: patent    PONV status at discharge: No PONV  Anesthetic complications: no      Cardiovascular status: stable  Respiratory status: unassisted and spontaneous ventilation  Hydration status: euvolemic  Follow-up not needed.          Vitals Value Taken Time   /72 07/28/20 1203   Temp 36.5 °C (97.7 °F) 07/28/20 1133   Pulse 79 07/28/20 1203   Resp 14 07/28/20 1203   SpO2 99 % 07/28/20 1203         Event Time   Out of Recovery 12:11:19         Pain/Caitie Score: Pain Rating Prior to Med Admin: 10 (7/27/2020 12:58 AM)  Caitie Score: 10 (7/28/2020 12:03 PM)

## 2020-07-29 ENCOUNTER — HOSPITAL ENCOUNTER (OUTPATIENT)
Dept: RADIOLOGY | Facility: HOSPITAL | Age: 55
Discharge: HOME | End: 2020-07-29
Attending: UROLOGY
Payer: MEDICAID

## 2020-07-29 ENCOUNTER — OFFICE VISIT (OUTPATIENT)
Dept: UROLOGY | Facility: CLINIC | Age: 55
End: 2020-07-29
Payer: MEDICAID

## 2020-07-29 ENCOUNTER — HOSPITAL ENCOUNTER (EMERGENCY)
Facility: HOSPITAL | Age: 55
Discharge: HOME | End: 2020-07-29
Attending: EMERGENCY MEDICINE
Payer: MEDICAID

## 2020-07-29 VITALS
DIASTOLIC BLOOD PRESSURE: 71 MMHG | HEIGHT: 69 IN | SYSTOLIC BLOOD PRESSURE: 112 MMHG | BODY MASS INDEX: 30.44 KG/M2 | WEIGHT: 205.5 LBS | HEART RATE: 89 BPM

## 2020-07-29 VITALS
RESPIRATION RATE: 18 BRPM | HEIGHT: 69 IN | SYSTOLIC BLOOD PRESSURE: 127 MMHG | OXYGEN SATURATION: 100 % | HEART RATE: 84 BPM | DIASTOLIC BLOOD PRESSURE: 64 MMHG | WEIGHT: 205 LBS | TEMPERATURE: 98 F | BODY MASS INDEX: 30.36 KG/M2

## 2020-07-29 DIAGNOSIS — R11.14 BILIOUS VOMITING WITH NAUSEA: Primary | ICD-10-CM

## 2020-07-29 DIAGNOSIS — Z85.41 HISTORY OF CERVICAL CANCER: ICD-10-CM

## 2020-07-29 DIAGNOSIS — R10.9 ABDOMINAL PAIN: ICD-10-CM

## 2020-07-29 DIAGNOSIS — K59.00 CONSTIPATION, UNSPECIFIED CONSTIPATION TYPE: ICD-10-CM

## 2020-07-29 DIAGNOSIS — N13.5 OBSTRUCTION OF BOTH URETERS: ICD-10-CM

## 2020-07-29 DIAGNOSIS — N13.30 HYDRONEPHROSIS OF RIGHT KIDNEY: Primary | ICD-10-CM

## 2020-07-29 LAB
ALBUMIN SERPL BCP-MCNC: 4 G/DL (ref 3.5–5.2)
ALP SERPL-CCNC: 106 U/L (ref 55–135)
ALT SERPL W/O P-5'-P-CCNC: 15 U/L (ref 10–44)
ANION GAP SERPL CALC-SCNC: 11 MMOL/L (ref 8–16)
AST SERPL-CCNC: 19 U/L (ref 10–40)
BASOPHILS # BLD AUTO: 0.04 K/UL (ref 0–0.2)
BASOPHILS NFR BLD: 0.3 % (ref 0–1.9)
BILIRUB SERPL-MCNC: 0.3 MG/DL (ref 0.1–1)
BUN SERPL-MCNC: 9 MG/DL (ref 6–20)
CALCIUM SERPL-MCNC: 9.9 MG/DL (ref 8.7–10.5)
CHLORIDE SERPL-SCNC: 104 MMOL/L (ref 95–110)
CO2 SERPL-SCNC: 26 MMOL/L (ref 23–29)
CREAT SERPL-MCNC: 1.3 MG/DL (ref 0.5–1.4)
DIFFERENTIAL METHOD: ABNORMAL
EOSINOPHIL # BLD AUTO: 0.1 K/UL (ref 0–0.5)
EOSINOPHIL NFR BLD: 0.8 % (ref 0–8)
ERYTHROCYTE [DISTWIDTH] IN BLOOD BY AUTOMATED COUNT: 14.2 % (ref 11.5–14.5)
EST. GFR  (AFRICAN AMERICAN): 52.6 ML/MIN/1.73 M^2
EST. GFR  (NON AFRICAN AMERICAN): 45.6 ML/MIN/1.73 M^2
GLUCOSE SERPL-MCNC: 130 MG/DL (ref 70–110)
HCT VFR BLD AUTO: 38.9 % (ref 37–48.5)
HGB BLD-MCNC: 12 G/DL (ref 12–16)
IMM GRANULOCYTES # BLD AUTO: 0.05 K/UL (ref 0–0.04)
IMM GRANULOCYTES NFR BLD AUTO: 0.4 % (ref 0–0.5)
LACTATE SERPL-SCNC: 1.9 MMOL/L (ref 0.5–2.2)
LIPASE SERPL-CCNC: 24 U/L (ref 4–60)
LYMPHOCYTES # BLD AUTO: 1.3 K/UL (ref 1–4.8)
LYMPHOCYTES NFR BLD: 10 % (ref 18–48)
MCH RBC QN AUTO: 27.3 PG (ref 27–31)
MCHC RBC AUTO-ENTMCNC: 30.8 G/DL (ref 32–36)
MCV RBC AUTO: 88 FL (ref 82–98)
MONOCYTES # BLD AUTO: 1.1 K/UL (ref 0.3–1)
MONOCYTES NFR BLD: 8.4 % (ref 4–15)
NEUTROPHILS # BLD AUTO: 10.5 K/UL (ref 1.8–7.7)
NEUTROPHILS NFR BLD: 80.1 % (ref 38–73)
NRBC BLD-RTO: 0 /100 WBC
PLATELET # BLD AUTO: 297 K/UL (ref 150–350)
PMV BLD AUTO: 10.5 FL (ref 9.2–12.9)
POTASSIUM SERPL-SCNC: 3.6 MMOL/L (ref 3.5–5.1)
PROT SERPL-MCNC: 8.5 G/DL (ref 6–8.4)
RBC # BLD AUTO: 4.4 M/UL (ref 4–5.4)
SODIUM SERPL-SCNC: 141 MMOL/L (ref 136–145)
WBC # BLD AUTO: 13.04 K/UL (ref 3.9–12.7)

## 2020-07-29 PROCEDURE — 99212 OFFICE O/P EST SF 10 MIN: CPT | Mod: S$PBB,,, | Performed by: UROLOGY

## 2020-07-29 PROCEDURE — 99284 EMERGENCY DEPT VISIT MOD MDM: CPT | Mod: 25,27

## 2020-07-29 PROCEDURE — 99285 EMERGENCY DEPT VISIT HI MDM: CPT | Mod: ,,, | Performed by: EMERGENCY MEDICINE

## 2020-07-29 PROCEDURE — 83690 ASSAY OF LIPASE: CPT

## 2020-07-29 PROCEDURE — 78708 NM KIDNEY W FLOW AND FUNCTION W PHARMACOLOGICAL: ICD-10-PCS | Mod: 26,,, | Performed by: RADIOLOGY

## 2020-07-29 PROCEDURE — 63600175 PHARM REV CODE 636 W HCPCS: Performed by: EMERGENCY MEDICINE

## 2020-07-29 PROCEDURE — 25000003 PHARM REV CODE 250: Performed by: EMERGENCY MEDICINE

## 2020-07-29 PROCEDURE — 96374 THER/PROPH/DIAG INJ IV PUSH: CPT

## 2020-07-29 PROCEDURE — 99285 PR EMERGENCY DEPT VISIT,LEVEL V: ICD-10-PCS | Mod: ,,, | Performed by: EMERGENCY MEDICINE

## 2020-07-29 PROCEDURE — 99999 PR PBB SHADOW E&M-EST. PATIENT-LVL III: ICD-10-PCS | Mod: PBBFAC,,, | Performed by: UROLOGY

## 2020-07-29 PROCEDURE — 99999 PR PBB SHADOW E&M-EST. PATIENT-LVL III: CPT | Mod: PBBFAC,,, | Performed by: UROLOGY

## 2020-07-29 PROCEDURE — 99213 OFFICE O/P EST LOW 20 MIN: CPT | Mod: PBBFAC,25 | Performed by: UROLOGY

## 2020-07-29 PROCEDURE — 78708 K FLOW/FUNCT IMAGE W/DRUG: CPT | Mod: 26,,, | Performed by: RADIOLOGY

## 2020-07-29 PROCEDURE — A9562 TC99M MERTIATIDE: HCPCS

## 2020-07-29 PROCEDURE — 83605 ASSAY OF LACTIC ACID: CPT

## 2020-07-29 PROCEDURE — 96375 TX/PRO/DX INJ NEW DRUG ADDON: CPT

## 2020-07-29 PROCEDURE — S0028 INJECTION, FAMOTIDINE, 20 MG: HCPCS | Performed by: EMERGENCY MEDICINE

## 2020-07-29 PROCEDURE — 80053 COMPREHEN METABOLIC PANEL: CPT

## 2020-07-29 PROCEDURE — 85025 COMPLETE CBC W/AUTO DIFF WBC: CPT

## 2020-07-29 PROCEDURE — 99212 PR OFFICE/OUTPT VISIT, EST, LEVL II, 10-19 MIN: ICD-10-PCS | Mod: S$PBB,,, | Performed by: UROLOGY

## 2020-07-29 PROCEDURE — 63600175 PHARM REV CODE 636 W HCPCS: Performed by: UROLOGY

## 2020-07-29 RX ORDER — FUROSEMIDE 10 MG/ML
40 INJECTION INTRAMUSCULAR; INTRAVENOUS ONCE
Status: COMPLETED | OUTPATIENT
Start: 2020-07-29 | End: 2020-07-29

## 2020-07-29 RX ORDER — ONDANSETRON 2 MG/ML
4 INJECTION INTRAMUSCULAR; INTRAVENOUS
Status: COMPLETED | OUTPATIENT
Start: 2020-07-29 | End: 2020-07-29

## 2020-07-29 RX ORDER — FAMOTIDINE 10 MG/ML
20 INJECTION INTRAVENOUS
Status: COMPLETED | OUTPATIENT
Start: 2020-07-29 | End: 2020-07-29

## 2020-07-29 RX ORDER — MORPHINE SULFATE 4 MG/ML
4 INJECTION, SOLUTION INTRAMUSCULAR; INTRAVENOUS
Status: COMPLETED | OUTPATIENT
Start: 2020-07-29 | End: 2020-07-29

## 2020-07-29 RX ORDER — SYRING-NEEDL,DISP,INSUL,0.3 ML 29 G X1/2"
296 SYRINGE, EMPTY DISPOSABLE MISCELLANEOUS
Status: COMPLETED | OUTPATIENT
Start: 2020-07-29 | End: 2020-07-29

## 2020-07-29 RX ORDER — POLYETHYLENE GLYCOL 3350 17 G/17G
17 POWDER, FOR SOLUTION ORAL DAILY
Qty: 1 BOTTLE | Refills: 0 | Status: ON HOLD | OUTPATIENT
Start: 2020-07-29 | End: 2020-08-15 | Stop reason: HOSPADM

## 2020-07-29 RX ADMIN — MAGNESIUM CITRATE 296 ML: 1.75 LIQUID ORAL at 07:07

## 2020-07-29 RX ADMIN — FAMOTIDINE 20 MG: 10 INJECTION, SOLUTION INTRAVENOUS at 04:07

## 2020-07-29 RX ADMIN — MORPHINE SULFATE 4 MG: 4 INJECTION INTRAVENOUS at 06:07

## 2020-07-29 RX ADMIN — ONDANSETRON 4 MG: 2 INJECTION INTRAMUSCULAR; INTRAVENOUS at 04:07

## 2020-07-29 RX ADMIN — FUROSEMIDE 40 MG: 10 INJECTION, SOLUTION INTRAMUSCULAR; INTRAVENOUS at 12:07

## 2020-07-29 NOTE — ED NOTES
Edita Riley, a 55 y.o. female presents to the ED w/ complaint of LLQ abd pain,N/V that started today after she left the urology clinic for labs and scans. Pt has bilateral nephrostomies. Pt reports nausea began once she got home. Pt has vomited 4 times. Pt denies CP, SOB, fevers, chills, changes to urinary or bowel patterns.    Triage note:  Chief Complaint   Patient presents with    Abdominal Pain     NV started today, denies fevers or diarrhea. Patient has bilateral nephrostomies     Review of patient's allergies indicates:   Allergen Reactions    Bee sting [allergen ext-venom-honey bee]      Rash      Grass pollen-bermuda, standard      rash     Past Medical History:   Diagnosis Date    Anxiety     Cervical cancer 2014    Chronic back pain     Depression     Diarrhea due to malabsorption 11/14/2018    Fibromyalgia     GERD (gastroesophageal reflux disease)     Hiatal hernia 2014    History of cervical cancer 10/11/2018    Hx of psychiatric care     Cymbalta, trazodone    Hypertension     Hypomagnesemia 11/21/2018    Lactose intolerance     Metastatic squamous cell carcinoma to lymph node 10/11/2018    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 drug 11/14/2018    Osteoarthritis of back     Psychiatric problem     Stroke 1972

## 2020-07-29 NOTE — PROGRESS NOTES
CHIEF COMPLAINT:    Mrs. Riley is a 55 y.o. female presenting to make sure the nephrostomy tubes are turned off in anticipation of the lasix renal scan.      PRESENTING ILLNESS:    Edita Riley is a 55 y.o. female presenting stating that she feels well.  Colonoscopy was done by Dr. Reynolds yesterday.     Allergies:  Bee sting [allergen ext-venom-honey bee] and Grass pollen-bermuda, standard    Medications:  Outpatient Encounter Medications as of 7/29/2020   Medication Sig Dispense Refill    amLODIPine (NORVASC) 5 MG tablet Take 5 mg by mouth.      cephALEXin (KEFLEX) 500 MG capsule Take 500 mg by mouth.      gabapentin (NEURONTIN) 300 MG capsule Take 1 capsule (300 mg total) by mouth 3 (three) times daily. 270 capsule 3    lisinopril 10 MG tablet Take 1 tablet (10 mg total) by mouth once daily. 90 tablet 3    omeprazole (PRILOSEC) 40 MG capsule Take 1 capsule (40 mg total) by mouth once daily. 90 capsule 3    ondansetron (ZOFRAN-ODT) 4 MG TbDL Take 1 tablet (4 mg total) by mouth every 6 (six) hours as needed (nausea). 5 tablet 0    oxyCODONE (ROXICODONE) 5 MG immediate release tablet Take 1 tablet (5 mg total) by mouth every 8 (eight) hours as needed for Pain. 10 tablet 0    traZODone (DESYREL) 50 MG tablet TAKE 1 TABLET (50 MG TOTAL) BY MOUTH EVERY EVENING. 30 tablet 11    docusate sodium (COLACE) 100 MG capsule Take 1 capsule (100 mg total) by mouth 2 (two) times daily as needed for Constipation. (Patient not taking: Reported on 7/29/2020) 30 capsule 0    ketoconazole (NIZORAL) 2 % cream Apply topically once daily. Apply to affected area (Patient not taking: Reported on 7/29/2020) 30 g 1       PHYSICAL EXAMINATION:    The patient generally appears in good health, is appropriately interactive, and is in no apparent distress.    Skin: No lesions.    Mental: Cooperative with normal affect.    Neuro: Grossly intact.    HEENT: Normal. No evidence of lymphadenopathy.    Chest:  normal  inspiratory effort.    Extremities: No clubbing, cyanosis, or edema    Percutaneous nephrostomy tubes were draining.  The valve was turned to the off position for the test.  She is not comfortable twisting the valve so will return to the office to turn them back to draining.     LABS:    Lab Results   Component Value Date    BUN 15 07/27/2020    CREATININE 1.3 07/27/2020       IMPRESSION:    Encounter Diagnoses   Name Primary?    Hydronephrosis of right kidney Yes    History of cervical cancer        PLAN:    1.  She will return after the lasix renal scan is done.     Addendum:  Patient returned after the Lasix renal scan was done and she reported no flank pain.  The valves were turned to the open position on both sides.

## 2020-07-29 NOTE — ED PROVIDER NOTES
Encounter Date: 7/29/2020    SCRIBE #1 NOTE: I, Helen Mohan, am scribing for, and in the presence of,  Betty Langston MD. I have scribed the following portions of the note - Other sections scribed: HPI ROS PE.       History     Chief Complaint   Patient presents with    Abdominal Pain     NV started today, denies fevers or diarrhea. Patient has bilateral nephrostomies     Time patient was seen by the provider: 3:40 PM      The patient is a 55 y.o. female with past medical history of GERD, hypertension, who presents to the ED with a complaint of abdominal pain. The current episode started today after being seen at urology. The patient was at urology today to have her nephrostomy tubes clamped for a possible lasix renal scan. The onset of the illness was abrupt. The other symptoms of the illness include nausea and vomiting. The emesis contains greenish bilious material. Denies fever, chills, chest pain or shortness of breath.     The history is provided by the patient and medical records.     Review of patient's allergies indicates:   Allergen Reactions    Bee sting [allergen ext-venom-honey bee]      Rash      Grass pollen-bermuda, standard      rash     Past Medical History:   Diagnosis Date    Anxiety     Cervical cancer 2014    Chronic back pain     Depression     Diarrhea due to malabsorption 11/14/2018    Fibromyalgia     GERD (gastroesophageal reflux disease)     Hiatal hernia 2014    History of cervical cancer 10/11/2018    Hx of psychiatric care     Cymbalta, trazodone    Hypertension     Hypomagnesemia 11/21/2018    Lactose intolerance     Metastatic squamous cell carcinoma to lymph node 10/11/2018    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 drug 11/14/2018    Osteoarthritis of back     Psychiatric problem     Stroke 1972     Past Surgical History:   Procedure Laterality Date    BILATERAL OOPHORECTOMY  2015    CHOLECYSTECTOMY  11/09/2016    Done at [facility], path showed chronic  cholecystitis and gallstones    cold knife conization  2014    COLONOSCOPY  2014    COLONOSCOPY N/A 10/24/2016    at [facility], Dr Gutiérrez    COLONOSCOPY N/A 5/18/2018    Procedure: COLONOSCOPY;  Surgeon: Arden Gutiérrez MD;  Location: [facility] ENDO (4TH FLR);  Service: Endoscopy;  Laterality: N/A;    COLONOSCOPY N/A 7/28/2020    Procedure: COLONOSCOPY;  Surgeon: Hammad Reynolds MD;  Location: [facility] ENDO (4TH FLR);  Service: Colon and Rectal;  Laterality: N/A;  covid test [place] 7/25    CYSTOSCOPY WITH URETEROSCOPY, RETROGRADE PYELOGRAPHY, AND INSERTION OF STENT Bilateral 3/21/2020    Procedure: CYSTOSCOPY, WITH RETROGRADE PYELOGRAM,;  Surgeon: Leslie Balbuena MD;  Location: [facility] OR 1ST FLR;  Service: Urology;  Laterality: Bilateral;    ESOPHAGOGASTRODUODENOSCOPY  2014    HYSTERECTOMY  2014    [facility] for cervical cancer    OOPHORECTOMY      RETROPERITONEAL LYMPHADENECTOMY Right 9/17/2018    Procedure: LYMPHADENECTOMY, RETROPERITONEUM;  Surgeon: Sebas Reed MD;  Location: [facility] OR 2ND FLR;  Service: General;  Laterality: Right;  ILIAC     Family History   Problem Relation Age of Onset    Heart disease Sister     Heart disease Maternal Grandmother     Colon cancer Father     Esophageal cancer Father     Cancer Father         Lung-smoker    Cancer Mother         Cervical    Cervical cancer Mother     Breast cancer Maternal Aunt     Suicide Daughter         jumped from parking structure    Drug abuse Daughter     Drug abuse Daughter     Rectal cancer Neg Hx     Stomach cancer Neg Hx     Crohn's disease Neg Hx     Ulcerative colitis Neg Hx     Diabetes Neg Hx     Hypertension Neg Hx      Social History     Tobacco Use    Smoking status: Former Smoker    Smokeless tobacco: Never Used   Substance Use Topics    Alcohol use: No     Alcohol/week: 0.0 standard drinks    Drug use: No     Review of Systems   Constitutional: Negative for chills and fever.   HENT: Negative for sore throat.     Respiratory: Negative for shortness of breath.    Cardiovascular: Negative for chest pain.   Gastrointestinal: Positive for abdominal pain, nausea and vomiting.   Genitourinary: Negative for dysuria.   Musculoskeletal: Negative for back pain.   Skin: Negative for rash.   Neurological: Negative for weakness.   Hematological: Does not bruise/bleed easily.       Physical Exam     Initial Vitals [07/29/20 1525]   BP Pulse Resp Temp SpO2   130/68 108 18 98.4 °F (36.9 °C) 100 %      MAP       --         Physical Exam    Nursing note and vitals reviewed.    Gen: AxOx3, NAD, well nourished  Eye: EOMI, no scleral icterus, no periorbital edema or ecchymosis  Head: NCAT, no lesions  ENT: neck supple, no stridor, no masses  CVS: RRR, no m/r/g, distal pulses intact/symmetric  Pulm: CTAB, no wheezes, rales or rhonchi, no increased work of breathing  Abd: soft, diffusely mild tenderness, there was greenish material in the emesis bag, nondistended, no organomegaly, no CVAT  Ext: no edema, lesions, rashes, or deformity  Neuro: GCS15, moving all extremities, gait intact  Psych: normal affect, cooperative      ED Course   Procedures  Labs Reviewed   CBC W/ AUTO DIFFERENTIAL   COMPREHENSIVE METABOLIC PANEL   LACTIC ACID, PLASMA   LIPASE          Imaging Results    None          Medical Decision Making:   History:   Old Medical Records: I decided to obtain old medical records.  Old Records Summarized: records from clinic visits.  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  She has not had much to eat today, otherwise has felt like her usual state of health.  No fevers or chills.  Her percutaneous nephrostomy tubes have been functioning fine.  I reviewed her labs from this morning and  it showed of reassuring BMP.  Differential includes gastritis, pancreatitis, I think less likely dysfunctioning percutaneous nephrostomy tubes, and given that her abdominal scan from earlier today did not show colitis, diverticulitis, or other intra-abdominal mass, I have low suspicion for those things.  Plan for symptomatic control, fluids and repeat labs.  Clinical Tests:   Lab Tests: Ordered and Reviewed  ED Management:  Labs pending at time of sign out to Dr. Fontanez, with plan to reassess symptoms if labs are reassuring.             Scribe Attestation:   Scribe #1: I performed the above scribed service and the documentation accurately describes the services I performed. I attest to the accuracy of the note.                          Clinical Impression:       ICD-10-CM ICD-9-CM   1. Bilious vomiting with nausea  R11.14 787.04                                Betty Langston MD  07/29/20 2969

## 2020-07-29 NOTE — ED NOTES
Pt states she is unable to sit due to pain.Pt trying to walk around CCR. Pt told she needs to stay in her designated recliner.Pt states she has to stand. Pt again found walking around CCR and leaning against wall. Pt moved to consult room 2 and asked to stay in bed.Pt informed that she is a fall risk and for her safety she needs to stay in the bed. Pt again getting out of the bed walking around the room. Pt escorted back to consult room 2 and asked to stay in the bed.

## 2020-07-30 NOTE — DISCHARGE INSTRUCTIONS
I recommend close follow up with your doctor for re-evaluation.    Your work up today was unremarkable other than a moderate amount of stool and likely constipation.    I recommend taking Miralax one capful nightly to prevent constipation. This is available over the counter.

## 2020-08-02 ENCOUNTER — HOSPITAL ENCOUNTER (EMERGENCY)
Facility: HOSPITAL | Age: 55
Discharge: HOME | End: 2020-08-02
Attending: EMERGENCY MEDICINE
Payer: MEDICAID

## 2020-08-02 VITALS
SYSTOLIC BLOOD PRESSURE: 108 MMHG | DIASTOLIC BLOOD PRESSURE: 58 MMHG | HEART RATE: 87 BPM | RESPIRATION RATE: 18 BRPM | HEIGHT: 69 IN | BODY MASS INDEX: 30.36 KG/M2 | OXYGEN SATURATION: 99 % | TEMPERATURE: 99 F | WEIGHT: 205 LBS

## 2020-08-02 DIAGNOSIS — N30.00 ACUTE CYSTITIS WITHOUT HEMATURIA: ICD-10-CM

## 2020-08-02 DIAGNOSIS — R10.9 ABDOMINAL PAIN, UNSPECIFIED ABDOMINAL LOCATION: Primary | ICD-10-CM

## 2020-08-02 DIAGNOSIS — E87.6 HYPOKALEMIA: ICD-10-CM

## 2020-08-02 LAB
ALBUMIN SERPL BCP-MCNC: 3.3 G/DL (ref 3.5–5.2)
ALP SERPL-CCNC: 88 U/L (ref 55–135)
ALT SERPL W/O P-5'-P-CCNC: 12 U/L (ref 10–44)
ANION GAP SERPL CALC-SCNC: 10 MMOL/L (ref 8–16)
AST SERPL-CCNC: 13 U/L (ref 10–40)
BACTERIA #/AREA URNS AUTO: ABNORMAL /HPF
BASOPHILS # BLD AUTO: 0.02 K/UL (ref 0–0.2)
BASOPHILS NFR BLD: 0.3 % (ref 0–1.9)
BILIRUB SERPL-MCNC: 0.3 MG/DL (ref 0.1–1)
BILIRUB UR QL STRIP: NEGATIVE
BUN SERPL-MCNC: 13 MG/DL (ref 6–20)
CALCIUM SERPL-MCNC: 9.7 MG/DL (ref 8.7–10.5)
CHLORIDE SERPL-SCNC: 101 MMOL/L (ref 95–110)
CLARITY UR REFRACT.AUTO: ABNORMAL
CO2 SERPL-SCNC: 28 MMOL/L (ref 23–29)
COLOR UR AUTO: YELLOW
CREAT SERPL-MCNC: 1.4 MG/DL (ref 0.5–1.4)
DIFFERENTIAL METHOD: ABNORMAL
EOSINOPHIL # BLD AUTO: 0.1 K/UL (ref 0–0.5)
EOSINOPHIL NFR BLD: 1.8 % (ref 0–8)
ERYTHROCYTE [DISTWIDTH] IN BLOOD BY AUTOMATED COUNT: 14.2 % (ref 11.5–14.5)
EST. GFR  (AFRICAN AMERICAN): 48.1 ML/MIN/1.73 M^2
EST. GFR  (NON AFRICAN AMERICAN): 41.7 ML/MIN/1.73 M^2
GLUCOSE SERPL-MCNC: 122 MG/DL (ref 70–110)
GLUCOSE UR QL STRIP: NEGATIVE
HCT VFR BLD AUTO: 36.1 % (ref 37–48.5)
HGB BLD-MCNC: 11.2 G/DL (ref 12–16)
HGB UR QL STRIP: ABNORMAL
HYALINE CASTS UR QL AUTO: 0 /LPF
IMM GRANULOCYTES # BLD AUTO: 0 K/UL (ref 0–0.04)
IMM GRANULOCYTES NFR BLD AUTO: 0 % (ref 0–0.5)
KETONES UR QL STRIP: ABNORMAL
LEUKOCYTE ESTERASE UR QL STRIP: ABNORMAL
LIPASE SERPL-CCNC: 14 U/L (ref 4–60)
LYMPHOCYTES # BLD AUTO: 1.2 K/UL (ref 1–4.8)
LYMPHOCYTES NFR BLD: 17.3 % (ref 18–48)
MCH RBC QN AUTO: 27.1 PG (ref 27–31)
MCHC RBC AUTO-ENTMCNC: 31 G/DL (ref 32–36)
MCV RBC AUTO: 87 FL (ref 82–98)
MICROSCOPIC COMMENT: ABNORMAL
MONOCYTES # BLD AUTO: 1.1 K/UL (ref 0.3–1)
MONOCYTES NFR BLD: 15.4 % (ref 4–15)
NEUTROPHILS # BLD AUTO: 4.7 K/UL (ref 1.8–7.7)
NEUTROPHILS NFR BLD: 65.2 % (ref 38–73)
NITRITE UR QL STRIP: NEGATIVE
NRBC BLD-RTO: 0 /100 WBC
PH UR STRIP: 6 [PH] (ref 5–8)
PLATELET # BLD AUTO: 292 K/UL (ref 150–350)
PMV BLD AUTO: 10.5 FL (ref 9.2–12.9)
POTASSIUM SERPL-SCNC: 3.4 MMOL/L (ref 3.5–5.1)
PROT SERPL-MCNC: 7.9 G/DL (ref 6–8.4)
PROT UR QL STRIP: ABNORMAL
RBC # BLD AUTO: 4.14 M/UL (ref 4–5.4)
RBC #/AREA URNS AUTO: >100 /HPF (ref 0–4)
SARS-COV-2 RDRP RESP QL NAA+PROBE: NEGATIVE
SODIUM SERPL-SCNC: 139 MMOL/L (ref 136–145)
SP GR UR STRIP: 1.02 (ref 1–1.03)
SQUAMOUS #/AREA URNS AUTO: 0 /HPF
URN SPEC COLLECT METH UR: ABNORMAL
WBC # BLD AUTO: 7.16 K/UL (ref 3.9–12.7)
WBC #/AREA URNS AUTO: 63 /HPF (ref 0–5)

## 2020-08-02 PROCEDURE — 85025 COMPLETE CBC W/AUTO DIFF WBC: CPT

## 2020-08-02 PROCEDURE — 81001 URINALYSIS AUTO W/SCOPE: CPT

## 2020-08-02 PROCEDURE — 87088 URINE BACTERIA CULTURE: CPT

## 2020-08-02 PROCEDURE — 87077 CULTURE AEROBIC IDENTIFY: CPT | Mod: 59

## 2020-08-02 PROCEDURE — 25000003 PHARM REV CODE 250: Performed by: EMERGENCY MEDICINE

## 2020-08-02 PROCEDURE — 63600175 PHARM REV CODE 636 W HCPCS: Performed by: EMERGENCY MEDICINE

## 2020-08-02 PROCEDURE — U0002 COVID-19 LAB TEST NON-CDC: HCPCS

## 2020-08-02 PROCEDURE — 87086 URINE CULTURE/COLONY COUNT: CPT

## 2020-08-02 PROCEDURE — 99284 PR EMERGENCY DEPT VISIT,LEVEL IV: ICD-10-PCS | Mod: ,,, | Performed by: EMERGENCY MEDICINE

## 2020-08-02 PROCEDURE — 80053 COMPREHEN METABOLIC PANEL: CPT

## 2020-08-02 PROCEDURE — 99285 EMERGENCY DEPT VISIT HI MDM: CPT | Mod: 25

## 2020-08-02 PROCEDURE — 83690 ASSAY OF LIPASE: CPT

## 2020-08-02 PROCEDURE — 96361 HYDRATE IV INFUSION ADD-ON: CPT

## 2020-08-02 PROCEDURE — 96374 THER/PROPH/DIAG INJ IV PUSH: CPT | Mod: 59

## 2020-08-02 PROCEDURE — 99284 EMERGENCY DEPT VISIT MOD MDM: CPT | Mod: ,,, | Performed by: EMERGENCY MEDICINE

## 2020-08-02 PROCEDURE — 87186 SC STD MICRODIL/AGAR DIL: CPT | Mod: 59

## 2020-08-02 PROCEDURE — 25500020 PHARM REV CODE 255: Performed by: EMERGENCY MEDICINE

## 2020-08-02 RX ORDER — AMOXICILLIN AND CLAVULANATE POTASSIUM 875; 125 MG/1; MG/1
1 TABLET, FILM COATED ORAL 2 TIMES DAILY
Qty: 14 TABLET | Refills: 0 | Status: ON HOLD | OUTPATIENT
Start: 2020-08-02 | End: 2020-08-15 | Stop reason: HOSPADM

## 2020-08-02 RX ORDER — CEFTRIAXONE 1 G/1
1 INJECTION, POWDER, FOR SOLUTION INTRAMUSCULAR; INTRAVENOUS
Status: COMPLETED | OUTPATIENT
Start: 2020-08-02 | End: 2020-08-02

## 2020-08-02 RX ADMIN — CEFTRIAXONE SODIUM 1 G: 1 INJECTION, POWDER, FOR SOLUTION INTRAMUSCULAR; INTRAVENOUS at 10:08

## 2020-08-02 RX ADMIN — IOHEXOL 100 ML: 350 INJECTION, SOLUTION INTRAVENOUS at 09:08

## 2020-08-02 RX ADMIN — SODIUM CHLORIDE 1000 ML: 0.9 INJECTION, SOLUTION INTRAVENOUS at 08:08

## 2020-08-02 RX ADMIN — POTASSIUM BICARBONATE 25 MEQ: 978 TABLET, EFFERVESCENT ORAL at 10:08

## 2020-08-03 NOTE — ED PROVIDER NOTES
Encounter Date: 8/2/2020    SCRIBE #1 NOTE: I, Christian Elliott, am scribing for, and in the presence of, Jian Li MD. Other sections scribed: HPI, ROS, PE, MDM.       History     Chief Complaint   Patient presents with    Abdominal Pain     Also generalized weakness and fatigue.     Time patient was seen by the provider: 7:37 PM    The patient is a 55 y.o. female with past medical history of hypertension, cervical cancer, and Hypomagnesemia who presents to the ED with a complaint of abdominal pain. She was last seen last week with similar symptoms, however she has indicated that the symptoms have worsened. Since being discharged last week she has not followed up with Dr. Tolbert her primary care physician. The patient indicates that she has experiencing left flank pain. She reports having pain when siting down. Patient reports having nephrostomy tubeplaced in since June 2020. Positive for weakness, diarrhea, fatigue, dysuria, and fever. Denies vomiting and constipation.       The history is provided by the patient and medical records.     Review of patient's allergies indicates:   Allergen Reactions    Bee sting [allergen ext-venom-honey bee]      Rash      Grass pollen-bermuda, standard      rash     Past Medical History:   Diagnosis Date    Anxiety     Cervical cancer 2014    Chronic back pain     Depression     Diarrhea due to malabsorption 11/14/2018    Fibromyalgia     GERD (gastroesophageal reflux disease)     Hiatal hernia 2014    History of cervical cancer 10/11/2018    Hx of psychiatric care     Cymbalta, trazodone    Hypertension     Hypomagnesemia 11/21/2018    Lactose intolerance     Metastatic squamous cell carcinoma to lymph node 10/11/2018    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 drug 11/14/2018    Osteoarthritis of back     Psychiatric problem     Stroke 1972     Past Surgical History:   Procedure Laterality Date    BILATERAL OOPHORECTOMY  2015    CHOLECYSTECTOMY  11/09/2016     Done at [facility], path showed chronic cholecystitis and gallstones    cold knife conization  2014    COLONOSCOPY  2014    COLONOSCOPY N/A 10/24/2016    at [facility], Dr Thomas    COLONOSCOPY N/A 5/18/2018    Procedure: COLONOSCOPY;  Surgeon: Arden Gutiérrez MD;  Location: [facility] (4TH FLR);  Service: Endoscopy;  Laterality: N/A;    COLONOSCOPY N/A 7/28/2020    Procedure: COLONOSCOPY;  Surgeon: Hammad Reynolds MD;  Location: [facility] ENDO (4TH FLR);  Service: Colon and Rectal;  Laterality: N/A;  covid test [place] 7/25    CYSTOSCOPY WITH URETEROSCOPY, RETROGRADE PYELOGRAPHY, AND INSERTION OF STENT Bilateral 3/21/2020    Procedure: CYSTOSCOPY, WITH RETROGRADE PYELOGRAM,;  Surgeon: Leslie Balbuena MD;  Location: [facility] OR 1ST FLR;  Service: Urology;  Laterality: Bilateral;    ESOPHAGOGASTRODUODENOSCOPY  2014    HYSTERECTOMY  2014    TVH for cervical cancer    OOPHORECTOMY      RETROPERITONEAL LYMPHADENECTOMY Right 9/17/2018    Procedure: LYMPHADENECTOMY, RETROPERITONEUM;  Surgeon: Sebas Reed MD;  Location: [facility] OR 2ND FLR;  Service: General;  Laterality: Right;  ILIAC     Family History   Problem Relation Age of Onset    Heart disease Sister     Heart disease Maternal Grandmother     Colon cancer Father     Esophageal cancer Father     Cancer Father         Lung-smoker    Cancer Mother         Cervical    Cervical cancer Mother     Breast cancer Maternal Aunt     Suicide Daughter         jumped from parking structure    Drug abuse Daughter     Drug abuse Daughter     Rectal cancer Neg Hx     Stomach cancer Neg Hx     Crohn's disease Neg Hx     Ulcerative colitis Neg Hx     Diabetes Neg Hx     Hypertension Neg Hx      Social History     Tobacco Use    Smoking status: Former Smoker    Smokeless tobacco: Never Used   Substance Use Topics    Alcohol use: No     Alcohol/week: 0.0 standard drinks    Drug use: No     Review of Systems   Constitutional: Positive for fatigue and fever.    HENT: Negative for sore throat.    Respiratory: Negative for shortness of breath.    Cardiovascular: Negative for chest pain.   Gastrointestinal: Positive for diarrhea. Negative for constipation, nausea and vomiting.   Genitourinary: Positive for dysuria.   Musculoskeletal: Negative for back pain.   Skin: Negative for rash.   Neurological: Positive for weakness.   Hematological: Does not bruise/bleed easily.       Physical Exam     Initial Vitals [08/02/20 1902]   BP Pulse Resp Temp SpO2   122/71 102 16 99 °F (37.2 °C) 99 %      MAP       --         Physical Exam    Nursing note and vitals reviewed.  Constitutional: She appears well-developed and well-nourished. She is not diaphoretic. No distress.   HENT:   Head: Normocephalic and atraumatic.   Mouth/Throat: Oropharynx is clear and moist.   Eyes: Conjunctivae and EOM are normal. Pupils are equal, round, and reactive to light.   Neck: Normal range of motion. Neck supple. No JVD present.   Cardiovascular: Normal rate, regular rhythm and normal heart sounds.   No murmur heard.  Pulmonary/Chest: Breath sounds normal. No respiratory distress. She has no wheezes. She has no rhonchi. She has no rales.   Abdominal: Soft. Bowel sounds are normal. She exhibits no distension and no mass. There is no abdominal tenderness. There is no rebound and no guarding.   nephrostomy tube site present   Musculoskeletal: Normal range of motion. No tenderness or edema.   Neurological: She is alert and oriented to person, place, and time. She has normal strength. No cranial nerve deficit or sensory deficit.   Skin: Skin is warm and dry. No rash noted.   Psychiatric: She has a normal mood and affect. Thought content normal.         ED Course   Procedures  Labs Reviewed   CBC W/ AUTO DIFFERENTIAL - Abnormal; Notable for the following components:       Result Value    Hemoglobin 11.2 (*)     Hematocrit 36.1 (*)     Mean Corpuscular Hemoglobin Conc 31.0 (*)     Mono # 1.1 (*)     Lymph% 17.3  (*)     Mono% 15.4 (*)     All other components within normal limits   COMPREHENSIVE METABOLIC PANEL - Abnormal; Notable for the following components:    Potassium 3.4 (*)     Glucose 122 (*)     Albumin 3.3 (*)     eGFR if  48.1 (*)     eGFR if non  41.7 (*)     All other components within normal limits   URINALYSIS, REFLEX TO URINE CULTURE - Abnormal; Notable for the following components:    Appearance, UA Cloudy (*)     Protein, UA 3+ (*)     Ketones, UA Trace (*)     Occult Blood UA 2+ (*)     Leukocytes, UA 2+ (*)     All other components within normal limits    Narrative:     Specimen Source->Urine   URINALYSIS MICROSCOPIC - Abnormal; Notable for the following components:    RBC, UA >100 (*)     WBC, UA 63 (*)     Bacteria Many (*)     All other components within normal limits    Narrative:     Specimen Source->Urine   CULTURE, URINE   SARS-COV-2 RNA AMPLIFICATION, QUAL   LIPASE          Imaging Results          CT Abdomen Pelvis With Contrast / no oral contrast (Final result)  Result time 08/02/20 21:49:18    Final result by Adilson Gerard MD (08/02/20 21:49:18)                 Impression:      1. Bilateral percutaneous nephrostomy tubes with decompression of the collecting system.  Minimal stranding associated with the renal pelvis and proximal ureters could represent a mild inflammatory or infectious process.  2. Small fat containing umbilical hernia.  3. Free fluid in the pelvis.      Electronically signed by: Adilson Gerard  Date:    08/02/2020  Time:    21:49             Narrative:    EXAMINATION:  CT ABDOMEN PELVIS WITH CONTRAST    CLINICAL HISTORY:  Abdominal pain, acute, nonlocalized;    TECHNIQUE:  Low dose axial images, sagittal and coronal reformations were obtained from the lung bases to the pubic symphysis following the IV administration of 100 mL of Omnipaque 350 .  Oral contrast was not administered.    COMPARISON:  07/27/2020    FINDINGS:  Abdomen:    - Lower  thorax:    - Lung bases: No infiltrates and no nodules.    - Liver: No focal mass.    - Gallbladder: Status post cholecystectomy.    - Bile Ducts: No evidence of intra or extra hepatic biliary ductal dilation.    - Spleen: Negative.    - Kidneys: No stone, soft tissue mass or hydronephrosis.  Bilateral percutaneous nephrostomy tubes.  Minimal stranding associated with the renal pelvis and proximal ureters bilaterally could represent a mild inflammatory infectious process.    - Adrenals: Unremarkable.    - Pancreas: No mass or peripancreatic fat stranding.    - Retroperitoneum:  No significant adenopathy.    - Vascular: No abdominal aortic aneurysm.    - Abdominal wall:  Small fat containing umbilical hernia.    Pelvis:    Urinary bladder is nondistended and not well visualized.  Stable moderate free fluid in the pelvis posteriorly.    Bowel/Mesentery:    No evidence of bowel obstruction or inflammation.    Bones:  No acute osseous abnormality and no suspicious lytic or blastic lesion.                                 Medical Decision Making:   History:   Old Medical Records: I decided to obtain old medical records.  Old Records Summarized: records from clinic visits.       <> Summary of Records: Has history of cervical cancer and pelvic phile after radiation with bilaterally frosteme tubes. She was seen in the ED last week with abdominal pain. Had a elevated white count but otherwise non-significant labs. CT scan at that time, showed no surgical abnormalities.    Initial Assessment:   Patient with dysuria and nephrostomy tubes in and is producing urine. Will check catheter tube, urine, basic labs and CT scan.   Clinical Tests:   Lab Tests: Ordered and Reviewed  Radiological Study: Ordered and Reviewed  ED Management:  Has signs of Urinary tract infection on a clear catch urinalysis. Normal white count. Mildly low potassium. Awaiting results of CT scan, will give does of rocephin IV and dose of potassium @ 9:50  pm    CT scan results: No surgical problems. nephrostomy tubes are in place. Fluids present in pelvis. Symptoms secondary to UTI will discharge home on antibiotics and advised to follow up PCP. Doubt this is a upper urinary tract infection or sepsis. @10:39 pm                                 Clinical Impression:       ICD-10-CM ICD-9-CM   1. Abdominal pain, unspecified abdominal location  R10.9 789.00   2. Hypokalemia  E87.6 276.8   3. Acute cystitis without hematuria  N30.00 595.0         Disposition:   Disposition: Discharged     ED Disposition Condition    Discharge Stable        ED Prescriptions     Medication Sig Dispense Start Date End Date Auth. Provider    amoxicillin-clavulanate 875-125mg (AUGMENTIN) 875-125 mg per tablet Take 1 tablet by mouth 2 (two) times daily. 14 tablet 8/2/2020  Jian Li MD    potassium bicarbonate (K-LYTE) disintegrating tablet Take 1 tablet (25 mEq total) by mouth once daily. 10 tablet 8/2/2020  Jian Li MD        Follow-up Information     Follow up With Specialties Details Why Contact Info    Audrey Mustafa MD Internal Medicine Call in 1 day  [address] [facility] [address]  [phone]      [facility] Emergency Medicine  If symptoms worsen [address] [facility] [id number] [phone]                                     Jian Li MD  08/02/20 [id number]

## 2020-08-03 NOTE — ED NOTES
Attempted straight catheterization x2 unsuccessfully due to abnormal anatomy. Physician informed. Stated to obtain urine sample by clean catch.

## 2020-08-03 NOTE — ED TRIAGE NOTES
Edita Riley, a 55 y.o. female presents to the ED w/ complaint of abdominal pain, weakness, diarrhea, and burning with urination that started today.    Triage note:  Chief Complaint   Patient presents with    Abdominal Pain     Also generalized weakness and fatigue.     Review of patient's allergies indicates:   Allergen Reactions    Bee sting [allergen ext-venom-honey bee]      Rash      Grass pollen-bermuda, standard      rash     Past Medical History:   Diagnosis Date    Anxiety     Cervical cancer 2014    Chronic back pain     Depression     Diarrhea due to malabsorption 11/14/2018    Fibromyalgia     GERD (gastroesophageal reflux disease)     Hiatal hernia 2014    History of cervical cancer 10/11/2018    Hx of psychiatric care     Cymbalta, trazodone    Hypertension     Hypomagnesemia 11/21/2018    Lactose intolerance     Metastatic squamous cell carcinoma to lymph node 10/11/2018    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 drug 11/14/2018    Osteoarthritis of back     Psychiatric problem     Stroke 1972     LOC: The patient is awake, alert, and oriented to place, time, situation. Affect is appropriate.  Speech is appropriate and clear.   APPEARANCE: Patient resting comfortably in no acute distress.  Patient is clean and well groomed.  SKIN: The skin is warm and dry; color consistent with ethnicity.  Patient has normal skin turgor and moist mucus membranes.  Skin intact; no breakdown or bruising noted.   MUSCULOSKELETAL: Patient moving upper and lower extremities without difficulty. Reports weakness.   RESPIRATORY: Airway is open and patent. Respirations spontaneous, even, easy, and non-labored.  Patient has a normal effort and rate.  No accessory muscle use noted. Denies cough.   CARDIAC:  Normal rhythm and rate noted.  No peripheral edema noted. No complaints of chest pain.    ABDOMEN: reports abdominal pain. No distention noted.   NEUROLOGIC: Eyes open spontaneously.  Behavior  appropriate to situation.  Follows commands; facial expression symmetrical.  Purposeful motor response noted; normal sensation in all extremities.  : Nephrostomy tubes noted.

## 2020-08-06 ENCOUNTER — HOSPITAL ENCOUNTER (EMERGENCY)
Facility: HOSPITAL | Age: 55
Discharge: HOME | End: 2020-08-06
Attending: EMERGENCY MEDICINE
Payer: MEDICAID

## 2020-08-06 VITALS
TEMPERATURE: 98 F | DIASTOLIC BLOOD PRESSURE: 73 MMHG | WEIGHT: 206 LBS | SYSTOLIC BLOOD PRESSURE: 143 MMHG | HEIGHT: 69 IN | HEART RATE: 92 BPM | OXYGEN SATURATION: 100 % | BODY MASS INDEX: 30.51 KG/M2 | RESPIRATION RATE: 16 BRPM

## 2020-08-06 DIAGNOSIS — T83.512A URINARY TRACT INFECTION ASSOCIATED WITH NEPHROSTOMY CATHETER, INITIAL ENCOUNTER: ICD-10-CM

## 2020-08-06 DIAGNOSIS — R10.9 RECURRENT ABDOMINAL PAIN: Primary | ICD-10-CM

## 2020-08-06 DIAGNOSIS — N39.0 URINARY TRACT INFECTION ASSOCIATED WITH NEPHROSTOMY CATHETER, INITIAL ENCOUNTER: ICD-10-CM

## 2020-08-06 DIAGNOSIS — R11.2 INTRACTABLE VOMITING WITH NAUSEA, UNSPECIFIED VOMITING TYPE: ICD-10-CM

## 2020-08-06 LAB
ANION GAP SERPL CALC-SCNC: 8 MMOL/L (ref 8–16)
BACTERIA #/AREA URNS AUTO: ABNORMAL /HPF
BACTERIA #/AREA URNS AUTO: ABNORMAL /HPF
BACTERIA UR CULT: ABNORMAL
BACTERIA UR CULT: ABNORMAL
BASOPHILS # BLD AUTO: 0.05 K/UL (ref 0–0.2)
BASOPHILS NFR BLD: 0.5 % (ref 0–1.9)
BILIRUB UR QL STRIP: NEGATIVE
BILIRUB UR QL STRIP: NEGATIVE
BUN SERPL-MCNC: 11 MG/DL (ref 6–20)
CALCIUM SERPL-MCNC: 10.2 MG/DL (ref 8.7–10.5)
CHLORIDE SERPL-SCNC: 107 MMOL/L (ref 95–110)
CLARITY UR REFRACT.AUTO: ABNORMAL
CLARITY UR REFRACT.AUTO: ABNORMAL
CO2 SERPL-SCNC: 27 MMOL/L (ref 23–29)
COLOR UR AUTO: ABNORMAL
COLOR UR AUTO: YELLOW
CREAT SERPL-MCNC: 1.2 MG/DL (ref 0.5–1.4)
DIFFERENTIAL METHOD: ABNORMAL
EOSINOPHIL # BLD AUTO: 0.2 K/UL (ref 0–0.5)
EOSINOPHIL NFR BLD: 1.9 % (ref 0–8)
ERYTHROCYTE [DISTWIDTH] IN BLOOD BY AUTOMATED COUNT: 14.2 % (ref 11.5–14.5)
EST. GFR  (AFRICAN AMERICAN): 58 ML/MIN/1.73 M^2
EST. GFR  (NON AFRICAN AMERICAN): 50.3 ML/MIN/1.73 M^2
GLUCOSE SERPL-MCNC: 95 MG/DL (ref 70–110)
GLUCOSE UR QL STRIP: NEGATIVE
GLUCOSE UR QL STRIP: NEGATIVE
HCT VFR BLD AUTO: 37.8 % (ref 37–48.5)
HGB BLD-MCNC: 11.4 G/DL (ref 12–16)
HGB UR QL STRIP: ABNORMAL
HGB UR QL STRIP: ABNORMAL
HYALINE CASTS UR QL AUTO: 0 /LPF
HYALINE CASTS UR QL AUTO: 0 /LPF
IMM GRANULOCYTES # BLD AUTO: 0.02 K/UL (ref 0–0.04)
IMM GRANULOCYTES NFR BLD AUTO: 0.2 % (ref 0–0.5)
KETONES UR QL STRIP: NEGATIVE
KETONES UR QL STRIP: NEGATIVE
LEUKOCYTE ESTERASE UR QL STRIP: ABNORMAL
LEUKOCYTE ESTERASE UR QL STRIP: ABNORMAL
LYMPHOCYTES # BLD AUTO: 1.6 K/UL (ref 1–4.8)
LYMPHOCYTES NFR BLD: 15 % (ref 18–48)
MCH RBC QN AUTO: 26.9 PG (ref 27–31)
MCHC RBC AUTO-ENTMCNC: 30.2 G/DL (ref 32–36)
MCV RBC AUTO: 89 FL (ref 82–98)
MICROSCOPIC COMMENT: ABNORMAL
MICROSCOPIC COMMENT: ABNORMAL
MONOCYTES # BLD AUTO: 0.8 K/UL (ref 0.3–1)
MONOCYTES NFR BLD: 7.5 % (ref 4–15)
NEUTROPHILS # BLD AUTO: 7.8 K/UL (ref 1.8–7.7)
NEUTROPHILS NFR BLD: 74.9 % (ref 38–73)
NITRITE UR QL STRIP: POSITIVE
NITRITE UR QL STRIP: POSITIVE
NRBC BLD-RTO: 0 /100 WBC
PH UR STRIP: 6 [PH] (ref 5–8)
PH UR STRIP: 6 [PH] (ref 5–8)
PLATELET # BLD AUTO: 267 K/UL (ref 150–350)
PMV BLD AUTO: 11.8 FL (ref 9.2–12.9)
POTASSIUM SERPL-SCNC: 4.3 MMOL/L (ref 3.5–5.1)
PROT UR QL STRIP: ABNORMAL
PROT UR QL STRIP: ABNORMAL
RBC # BLD AUTO: 4.24 M/UL (ref 4–5.4)
RBC #/AREA URNS AUTO: >100 /HPF (ref 0–4)
RBC #/AREA URNS AUTO: >100 /HPF (ref 0–4)
SARS-COV-2 RDRP RESP QL NAA+PROBE: NEGATIVE
SODIUM SERPL-SCNC: 142 MMOL/L (ref 136–145)
SP GR UR STRIP: 1.02 (ref 1–1.03)
SP GR UR STRIP: 1.02 (ref 1–1.03)
URN SPEC COLLECT METH UR: ABNORMAL
URN SPEC COLLECT METH UR: ABNORMAL
WBC # BLD AUTO: 10.43 K/UL (ref 3.9–12.7)
WBC #/AREA URNS AUTO: 63 /HPF (ref 0–5)
WBC #/AREA URNS AUTO: >100 /HPF (ref 0–5)

## 2020-08-06 PROCEDURE — 99284 PR EMERGENCY DEPT VISIT,LEVEL IV: ICD-10-PCS | Mod: ,,, | Performed by: EMERGENCY MEDICINE

## 2020-08-06 PROCEDURE — 81001 URINALYSIS AUTO W/SCOPE: CPT

## 2020-08-06 PROCEDURE — U0002 COVID-19 LAB TEST NON-CDC: HCPCS

## 2020-08-06 PROCEDURE — 99284 EMERGENCY DEPT VISIT MOD MDM: CPT | Mod: 25

## 2020-08-06 PROCEDURE — 25000003 PHARM REV CODE 250: Performed by: EMERGENCY MEDICINE

## 2020-08-06 PROCEDURE — 80048 BASIC METABOLIC PNL TOTAL CA: CPT

## 2020-08-06 PROCEDURE — 87086 URINE CULTURE/COLONY COUNT: CPT

## 2020-08-06 PROCEDURE — 96374 THER/PROPH/DIAG INJ IV PUSH: CPT

## 2020-08-06 PROCEDURE — 63600175 PHARM REV CODE 636 W HCPCS: Performed by: EMERGENCY MEDICINE

## 2020-08-06 PROCEDURE — 85025 COMPLETE CBC W/AUTO DIFF WBC: CPT

## 2020-08-06 PROCEDURE — 99284 EMERGENCY DEPT VISIT MOD MDM: CPT | Mod: ,,, | Performed by: EMERGENCY MEDICINE

## 2020-08-06 RX ORDER — ONDANSETRON 2 MG/ML
4 INJECTION INTRAMUSCULAR; INTRAVENOUS
Status: COMPLETED | OUTPATIENT
Start: 2020-08-06 | End: 2020-08-06

## 2020-08-06 RX ORDER — SULFAMETHOXAZOLE AND TRIMETHOPRIM 800; 160 MG/1; MG/1
1 TABLET ORAL 2 TIMES DAILY
Qty: 28 TABLET | Refills: 0 | Status: ON HOLD | OUTPATIENT
Start: 2020-08-06 | End: 2020-08-13 | Stop reason: CLARIF

## 2020-08-06 RX ORDER — OXYCODONE AND ACETAMINOPHEN 5; 325 MG/1; MG/1
1 TABLET ORAL
Status: COMPLETED | OUTPATIENT
Start: 2020-08-06 | End: 2020-08-06

## 2020-08-06 RX ORDER — ONDANSETRON 4 MG/1
4 TABLET, ORALLY DISINTEGRATING ORAL EVERY 8 HOURS PRN
Qty: 20 TABLET | Refills: 2 | Status: ON HOLD | OUTPATIENT
Start: 2020-08-06 | End: 2020-12-03 | Stop reason: HOSPADM

## 2020-08-06 RX ADMIN — OXYCODONE HYDROCHLORIDE AND ACETAMINOPHEN 1 TABLET: 5; 325 TABLET ORAL at 06:08

## 2020-08-06 RX ADMIN — ONDANSETRON 4 MG: 2 INJECTION INTRAMUSCULAR; INTRAVENOUS at 06:08

## 2020-08-06 NOTE — ED NOTES
Patient actively vomiting large amounts yellow secretions. Physician aware.      Nohemy Palacios RN  08/06/20 [id number]

## 2020-08-06 NOTE — ED PROVIDER NOTES
Encounter Date: 8/6/2020    SCRIBE #1 NOTE: I, Helen Mohan, am scribing for, and in the presence of,  Andrew Quiñones MD. I have scribed the following portions of the note - Other sections scribed: HPI ROS PE.       History     Chief Complaint   Patient presents with    Abdominal Pain     x 1 year.  x 1 epiosde of emesis todfay. Denies diarrhea, fever or chills     The history is provided by the patient and medical records.      Ms. Riley is a 55 y.o. female with hypertension, GERD, here today with abdominal pain. The patient reports this is a recurring problem. She states she has been having abdominal pain for a year; however, she indicates that her symptoms are worse today. The pain is mostly to her mid abdomen. She states she attempted treatment with Oxycodone for the pain which gave some relief, but she has ran out of her pills last week. Notes of associated nausea and vomiting that began today. She only vomited once today. The patient has nephrostomy tubes that was placed in June 2020. She states these have been functioning fine. Her last bowel movement was this morning, this was normal. Denies dysuria, cough, fever, chills.      Review of patient's allergies indicates:   Allergen Reactions    Bee sting [allergen ext-venom-honey bee]      Rash      Grass pollen-bermuda, standard      rash     Past Medical History:   Diagnosis Date    Anxiety     Cervical cancer 2014    Chronic back pain     Depression     Diarrhea due to malabsorption 11/14/2018    Fibromyalgia     GERD (gastroesophageal reflux disease)     Hiatal hernia 2014    History of cervical cancer 10/11/2018    Hx of psychiatric care     Cymbalta, trazodone    Hypertension     Hypomagnesemia 11/21/2018    Lactose intolerance     Metastatic squamous cell carcinoma to lymph node 10/11/2018    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 drug 11/14/2018    Osteoarthritis of back     Psychiatric problem     Stroke 1972     Past  Surgical History:   Procedure Laterality Date    BILATERAL OOPHORECTOMY  2015    CHOLECYSTECTOMY  11/09/2016    Done at [facility], path showed chronic cholecystitis and gallstones    cold knife conization  2014    COLONOSCOPY  2014    COLONOSCOPY N/A 10/24/2016    at [facility], Dr Thomas    COLONOSCOPY N/A 5/18/2018    Procedure: COLONOSCOPY;  Surgeon: Arden Gutiérrez MD;  Location: [facility] (4TH FLR);  Service: Endoscopy;  Laterality: N/A;    COLONOSCOPY N/A 7/28/2020    Procedure: COLONOSCOPY;  Surgeon: Hammad Reynolds MD;  Location: [facility] (4TH FLR);  Service: Colon and Rectal;  Laterality: N/A;  covid test [place] 7/25    CYSTOSCOPY WITH URETEROSCOPY, RETROGRADE PYELOGRAPHY, AND INSERTION OF STENT Bilateral 3/21/2020    Procedure: CYSTOSCOPY, WITH RETROGRADE PYELOGRAM,;  Surgeon: Leslie Balbuena MD;  Location: [facility] OR 1ST FLR;  Service: Urology;  Laterality: Bilateral;    ESOPHAGOGASTRODUODENOSCOPY  2014    HYSTERECTOMY  2014    TVH for cervical cancer    OOPHORECTOMY      RETROPERITONEAL LYMPHADENECTOMY Right 9/17/2018    Procedure: LYMPHADENECTOMY, RETROPERITONEUM;  Surgeon: Sebas Reed MD;  Location: [facility] OR 2ND FLR;  Service: General;  Laterality: Right;  ILIAC     Family History   Problem Relation Age of Onset    Heart disease Sister     Heart disease Maternal Grandmother     Colon cancer Father     Esophageal cancer Father     Cancer Father         Lung-smoker    Cancer Mother         Cervical    Cervical cancer Mother     Breast cancer Maternal Aunt     Suicide Daughter         jumped from parking structure    Drug abuse Daughter     Drug abuse Daughter     Rectal cancer Neg Hx     Stomach cancer Neg Hx     Crohn's disease Neg Hx     Ulcerative colitis Neg Hx     Diabetes Neg Hx     Hypertension Neg Hx      Social History     Tobacco Use    Smoking status: Former Smoker    Smokeless tobacco: Never Used   Substance Use Topics    Alcohol use: No     Alcohol/week:  0.0 standard drinks    Drug use: No     Review of Systems   Constitutional: Negative for chills and fever.   HENT: Negative for sore throat.    Respiratory: Negative for cough and shortness of breath.    Cardiovascular: Negative for chest pain.   Gastrointestinal: Positive for abdominal pain, nausea and vomiting.   Genitourinary: Negative for dysuria.   Musculoskeletal: Negative for back pain.   Skin: Negative for rash.   Neurological: Negative for weakness.   Hematological: Does not bruise/bleed easily.       Physical Exam     Initial Vitals [08/06/20 1634]   BP Pulse Resp Temp SpO2   134/75 101 14 98.4 °F (36.9 °C) 98 %      MAP       --         Physical Exam    Nursing note and vitals reviewed.  Constitutional: She appears well-developed and well-nourished. She is not diaphoretic. No distress.   HENT:   Head: Normocephalic and atraumatic.   Right Ear: External ear normal.   Left Ear: External ear normal.   Neck: Neck supple.   Cardiovascular: Normal rate, regular rhythm, normal heart sounds and intact distal pulses.   Pulmonary/Chest: Breath sounds normal. No respiratory distress. She has no wheezes. She has no rhonchi. She has no rales.   Abdominal: Soft. She exhibits no distension. There is no abdominal tenderness. There is no rebound, no guarding and no CVA tenderness.   Bilateral nephrostomy tubes. Left with a bloody output, the insertions site is without purulent drainage, erythema, or focal tenderness to palpation.    Musculoskeletal: Edema present.      Comments: Pitting edema in bilateral lower extremities.    Neurological: She is alert and oriented to person, place, and time. GCS score is 15. GCS eye subscore is 4. GCS verbal subscore is 5. GCS motor subscore is 6.   Skin: Skin is warm. Capillary refill takes less than 2 seconds. No rash noted.   Psychiatric: She has a normal mood and affect.         ED Course   Procedures  Labs Reviewed   CBC W/ AUTO DIFFERENTIAL - Abnormal; Notable for the following  components:       Result Value    Hemoglobin 11.4 (*)     Mean Corpuscular Hemoglobin 26.9 (*)     Mean Corpuscular Hemoglobin Conc 30.2 (*)     Gran # (ANC) 7.8 (*)     Gran% 74.9 (*)     Lymph% 15.0 (*)     All other components within normal limits   URINALYSIS, REFLEX TO URINE CULTURE - Abnormal; Notable for the following components:    Color, UA Red (*)     Appearance, UA Cloudy (*)     Protein, UA 3+ (*)     Occult Blood UA 3+ (*)     Nitrite, UA Positive (*)     Leukocytes, UA 2+ (*)     All other components within normal limits    Narrative:     Left side  Specimen Source->Urine   URINALYSIS, REFLEX TO URINE CULTURE - Abnormal; Notable for the following components:    Appearance, UA Cloudy (*)     Protein, UA 3+ (*)     Occult Blood UA 3+ (*)     Nitrite, UA Positive (*)     Leukocytes, UA 2+ (*)     All other components within normal limits    Narrative:     Right side  Specimen Source->Urine   BASIC METABOLIC PANEL - Abnormal; Notable for the following components:    eGFR if  58.0 (*)     eGFR if non  50.3 (*)     All other components within normal limits   URINALYSIS MICROSCOPIC - Abnormal; Notable for the following components:    RBC, UA >100 (*)     WBC, UA 63 (*)     All other components within normal limits    Narrative:     Left side  Specimen Source->Urine   URINALYSIS MICROSCOPIC - Abnormal; Notable for the following components:    RBC, UA >100 (*)     WBC, UA >100 (*)     All other components within normal limits    Narrative:     Right side  Specimen Source->Urine   CULTURE, URINE    Narrative:     Left side  Specimen Source->Urine   CULTURE, URINE    Narrative:     Right side  Specimen Source->Urine   SARS-COV-2 RNA AMPLIFICATION, QUAL          Imaging Results    None          Medical Decision Making:   History:   Old Medical Records: I decided to obtain old medical records.  Old Records Summarized: records from clinic visits, records from previous admission(s) and  other records.       <> Summary of Records: Recently seen here for abdominal pain, acute on chronic.  Has had 2 negative CT scans in the past week.  At UTI recently that was multiple organisms treated only with Augmentin.  Clinical Tests:   Lab Tests: Ordered and Reviewed  Radiological Study: Reviewed  ED Management:  Vitals normal.  Afebrile.  Here with acute on chronic abdominal pain.  Out of home opiates.  I suspect this is what is causing her pain today more so than acute abdominal emergency.  Abdominal exam soft, nontender without concerning features.  No rebound.  No evidence of peritonitis.  No evidence of SBO, or other abdominal emergency.  Furthermore, has had 2 negative CT scans of the past week.  Will obtain CBC, CMP, UA and give p.o. Percocet.  Patient vomited once but was given Zofran.    Labs grossly within normal limits without actionable values except for UA consistent with persistent UTI.  On further chart review, it appears that multiple organisms grew out in the urine.  However, only 1 was sensitive to Augmentin.  Will prescribe Bactrim as both isolates were sensitive to this.    Passed PO challenge in ED.  Pain improved in Ed. Repeat exam remains benign.  Rx for bactrim and zofran provided.    Stable for discharge at this time. Return precautions discussed.               Scribe Attestation:   Scribe #1: I performed the above scribed service and the documentation accurately describes the services I performed. I attest to the accuracy of the note.                          Clinical Impression:       ICD-10-CM ICD-9-CM   1. Recurrent abdominal pain  R10.9 789.00   2. Intractable vomiting with nausea, unspecified vomiting type  R11.2 536.2   3. Urinary tract infection associated with nephrostomy catheter, initial encounter  T83.512A 996.64    N39.0 599.0             ED Disposition Condition    Discharge Stable        ED Prescriptions     Medication Sig Dispense Start Date End Date Auth. Provider     sulfamethoxazole-trimethoprim 800-160mg (BACTRIM DS) 800-160 mg Tab Take 1 tablet by mouth 2 (two) times daily. for 14 days 28 tablet 8/6/2020 8/20/2020 Andrew Quiñones MD    ondansetron (ZOFRAN-ODT) 4 MG TbDL Take 1 tablet (4 mg total) by mouth every 8 (eight) hours as needed (nausea or vomiting). 20 tablet 8/6/2020  Andrew Quiñones MD        Follow-up Information     Follow up With Specialties Details Why Contact Info    Audrey Mustafa MD Internal Medicine  As needed [address] [facility] [address]  [phone]      [facility] Emergency Medicine  If symptoms worsen [address] [facility] [id number] [phone]                                     Andrew Quiñones MD  08/07/20 5012

## 2020-08-06 NOTE — ED NOTES
Patient identifiers verified and correct for  Ms Riley  C/C: Mid upper abd pain SEE NN  APPEARANCE: awake and alert in NAD.  SKIN: warm, dry and intact. No breakdown or bruising.  MUSCULOSKELETAL: Patient moving all extremities spontaneously, no obvious swelling or deformities noted. Ambulates independently.  RESPIRATORY: Denies shortness of breath.Respirations unlabored. Denies fevers  CARDIAC: Denies CP, 2+ distal pulses; no peripheral edema  ABDOMEN: ABdomen soft, pain to mid upper abdomen, positive nausea and vomiting, denies diarrhea.   : voids spontaneously, aleah mid upper back nephrostomy tubes left with serous pink drainage, right with tan colored drainage, states voids without diffiuclty  Neurologic: AAO x 4; follows commands equal strength in all extremities; denies numbness/tingling. Denies dizziness Positive gen weakness     Nohemy Palacios RN  08/06/20 9917

## 2020-08-06 NOTE — ED NOTES
Urine obtained per sterile technique, gentle, no aspiration, dripped 1cc each side, per physician verbal orders.      Nohemy Palacios RN  08/06/20 1806       Nohemy Palacios RN  08/06/20 4341

## 2020-08-06 NOTE — ED NOTES
Left nephrostomy tube insertion site cleansed with saline, covered with TELFA and paper tape. RIght nephrostomy tube with scab noted at insertion site, cleansed with saline, covered with TELFA And paper tape. Both tubes arrived to ED with no dressing, extra supplies given      Nohemy AMY Palacios  08/06/20 1800

## 2020-08-06 NOTE — ED TRIAGE NOTES
Patient states mid abd pain onset today, positive emesis x 4 today, denies diarrhea, denies fevers. Has bilateral kiney tubes, both are draining without difficulty. Denies pain or burning with urination. Out of Oxycodone x 1 week, currently taking Augmentin.

## 2020-08-07 LAB
BACTERIA UR CULT: NORMAL

## 2020-08-12 ENCOUNTER — HOSPITAL ENCOUNTER (INPATIENT)
Facility: HOSPITAL | Age: 55
LOS: 3 days | Discharge: HOME | DRG: 699 | End: 2020-08-15
Attending: EMERGENCY MEDICINE | Admitting: EMERGENCY MEDICINE
Payer: MEDICAID

## 2020-08-12 DIAGNOSIS — T83.022A NEPHROSTOMY TUBE DISPLACED: Primary | ICD-10-CM

## 2020-08-12 DIAGNOSIS — R10.13 EPIGASTRIC PAIN: ICD-10-CM

## 2020-08-12 LAB
ALBUMIN SERPL BCP-MCNC: 3 G/DL (ref 3.5–5.2)
ALP SERPL-CCNC: 84 U/L (ref 55–135)
ALT SERPL W/O P-5'-P-CCNC: 11 U/L (ref 10–44)
ANION GAP SERPL CALC-SCNC: 9 MMOL/L (ref 8–16)
AST SERPL-CCNC: 12 U/L (ref 10–40)
BACTERIA #/AREA URNS AUTO: ABNORMAL /HPF
BASOPHILS # BLD AUTO: 0.03 K/UL (ref 0–0.2)
BASOPHILS NFR BLD: 0.3 % (ref 0–1.9)
BILIRUB SERPL-MCNC: 0.3 MG/DL (ref 0.1–1)
BILIRUB UR QL STRIP: NEGATIVE
BUN SERPL-MCNC: 11 MG/DL (ref 6–20)
BUN SERPL-MCNC: 17 MG/DL (ref 6–30)
CALCIUM SERPL-MCNC: 8.9 MG/DL (ref 8.7–10.5)
CHLORIDE SERPL-SCNC: 104 MMOL/L (ref 95–110)
CHLORIDE SERPL-SCNC: 108 MMOL/L (ref 95–110)
CLARITY UR REFRACT.AUTO: ABNORMAL
CO2 SERPL-SCNC: 25 MMOL/L (ref 23–29)
COLOR UR AUTO: YELLOW
CREAT SERPL-MCNC: 1.1 MG/DL (ref 0.5–1.4)
CREAT SERPL-MCNC: 1.2 MG/DL (ref 0.5–1.4)
DIFFERENTIAL METHOD: ABNORMAL
EOSINOPHIL # BLD AUTO: 0.3 K/UL (ref 0–0.5)
EOSINOPHIL NFR BLD: 2.8 % (ref 0–8)
ERYTHROCYTE [DISTWIDTH] IN BLOOD BY AUTOMATED COUNT: 14.5 % (ref 11.5–14.5)
EST. GFR  (AFRICAN AMERICAN): >60 ML/MIN/1.73 M^2
EST. GFR  (NON AFRICAN AMERICAN): 55.9 ML/MIN/1.73 M^2
GLUCOSE SERPL-MCNC: 100 MG/DL (ref 70–110)
GLUCOSE SERPL-MCNC: 127 MG/DL (ref 70–110)
GLUCOSE UR QL STRIP: ABNORMAL
HCT VFR BLD AUTO: 38.1 % (ref 37–48.5)
HCT VFR BLD CALC: 35 %PCV (ref 36–54)
HGB BLD-MCNC: 11.3 G/DL (ref 12–16)
HGB UR QL STRIP: ABNORMAL
HYALINE CASTS UR QL AUTO: 0 /LPF
IMM GRANULOCYTES # BLD AUTO: 0.04 K/UL (ref 0–0.04)
IMM GRANULOCYTES NFR BLD AUTO: 0.5 % (ref 0–0.5)
KETONES UR QL STRIP: NEGATIVE
LACTATE SERPL-SCNC: 0.8 MMOL/L (ref 0.5–2.2)
LEUKOCYTE ESTERASE UR QL STRIP: ABNORMAL
LIPASE SERPL-CCNC: 12 U/L (ref 4–60)
LYMPHOCYTES # BLD AUTO: 1.4 K/UL (ref 1–4.8)
LYMPHOCYTES NFR BLD: 15.8 % (ref 18–48)
MCH RBC QN AUTO: 26.8 PG (ref 27–31)
MCHC RBC AUTO-ENTMCNC: 29.7 G/DL (ref 32–36)
MCV RBC AUTO: 91 FL (ref 82–98)
MICROSCOPIC COMMENT: ABNORMAL
MONOCYTES # BLD AUTO: 0.7 K/UL (ref 0.3–1)
MONOCYTES NFR BLD: 8.1 % (ref 4–15)
NEUTROPHILS # BLD AUTO: 6.4 K/UL (ref 1.8–7.7)
NEUTROPHILS NFR BLD: 72.5 % (ref 38–73)
NITRITE UR QL STRIP: NEGATIVE
NRBC BLD-RTO: 0 /100 WBC
PH UR STRIP: 8 [PH] (ref 5–8)
PLATELET # BLD AUTO: 338 K/UL (ref 150–350)
PMV BLD AUTO: 9.7 FL (ref 9.2–12.9)
POC IONIZED CALCIUM: 1.12 MMOL/L (ref 1.06–1.42)
POC TCO2 (MEASURED): 29 MMOL/L (ref 23–29)
POTASSIUM BLD-SCNC: 5.4 MMOL/L (ref 3.5–5.1)
POTASSIUM SERPL-SCNC: 3.6 MMOL/L (ref 3.5–5.1)
PROT SERPL-MCNC: 7 G/DL (ref 6–8.4)
PROT UR QL STRIP: ABNORMAL
RBC # BLD AUTO: 4.21 M/UL (ref 4–5.4)
RBC #/AREA URNS AUTO: >100 /HPF (ref 0–4)
SAMPLE: ABNORMAL
SARS-COV-2 RDRP RESP QL NAA+PROBE: NEGATIVE
SODIUM BLD-SCNC: 140 MMOL/L (ref 136–145)
SODIUM SERPL-SCNC: 142 MMOL/L (ref 136–145)
SP GR UR STRIP: >=1.03 (ref 1–1.03)
SQUAMOUS #/AREA URNS AUTO: 74 /HPF
URN SPEC COLLECT METH UR: ABNORMAL
WBC # BLD AUTO: 8.78 K/UL (ref 3.9–12.7)
WBC #/AREA URNS AUTO: >100 /HPF (ref 0–5)
WBC CLUMPS UR QL AUTO: ABNORMAL

## 2020-08-12 PROCEDURE — 99285 EMERGENCY DEPT VISIT HI MDM: CPT | Mod: 25

## 2020-08-12 PROCEDURE — 99222 PR INITIAL HOSPITAL CARE,LEVL II: ICD-10-PCS | Mod: ,,, | Performed by: HOSPITALIST

## 2020-08-12 PROCEDURE — 96375 TX/PRO/DX INJ NEW DRUG ADDON: CPT

## 2020-08-12 PROCEDURE — 81001 URINALYSIS AUTO W/SCOPE: CPT

## 2020-08-12 PROCEDURE — 63600175 PHARM REV CODE 636 W HCPCS: Performed by: EMERGENCY MEDICINE

## 2020-08-12 PROCEDURE — 99285 PR EMERGENCY DEPT VISIT,LEVEL V: ICD-10-PCS | Mod: ,,, | Performed by: EMERGENCY MEDICINE

## 2020-08-12 PROCEDURE — 96374 THER/PROPH/DIAG INJ IV PUSH: CPT

## 2020-08-12 PROCEDURE — U0002 COVID-19 LAB TEST NON-CDC: HCPCS

## 2020-08-12 PROCEDURE — 11000001 HC ACUTE MED/SURG PRIVATE ROOM

## 2020-08-12 PROCEDURE — 25000003 PHARM REV CODE 250: Performed by: HOSPITALIST

## 2020-08-12 PROCEDURE — 80053 COMPREHEN METABOLIC PANEL: CPT

## 2020-08-12 PROCEDURE — 85025 COMPLETE CBC W/AUTO DIFF WBC: CPT

## 2020-08-12 PROCEDURE — 99222 1ST HOSP IP/OBS MODERATE 55: CPT | Mod: ,,, | Performed by: HOSPITALIST

## 2020-08-12 PROCEDURE — 93005 ELECTROCARDIOGRAM TRACING: CPT

## 2020-08-12 PROCEDURE — 87040 BLOOD CULTURE FOR BACTERIA: CPT

## 2020-08-12 PROCEDURE — 25500020 PHARM REV CODE 255: Performed by: EMERGENCY MEDICINE

## 2020-08-12 PROCEDURE — 87086 URINE CULTURE/COLONY COUNT: CPT

## 2020-08-12 PROCEDURE — 93010 EKG 12-LEAD: ICD-10-PCS | Mod: ,,, | Performed by: INTERNAL MEDICINE

## 2020-08-12 PROCEDURE — 99285 EMERGENCY DEPT VISIT HI MDM: CPT | Mod: ,,, | Performed by: EMERGENCY MEDICINE

## 2020-08-12 PROCEDURE — 83690 ASSAY OF LIPASE: CPT

## 2020-08-12 PROCEDURE — 93010 ELECTROCARDIOGRAM REPORT: CPT | Mod: ,,, | Performed by: INTERNAL MEDICINE

## 2020-08-12 PROCEDURE — 83605 ASSAY OF LACTIC ACID: CPT

## 2020-08-12 RX ORDER — ACETAMINOPHEN 500 MG
500 TABLET ORAL EVERY 6 HOURS PRN
Status: DISCONTINUED | OUTPATIENT
Start: 2020-08-12 | End: 2020-08-12

## 2020-08-12 RX ORDER — MORPHINE SULFATE 4 MG/ML
4 INJECTION, SOLUTION INTRAMUSCULAR; INTRAVENOUS
Status: COMPLETED | OUTPATIENT
Start: 2020-08-12 | End: 2020-08-12

## 2020-08-12 RX ORDER — ONDANSETRON 2 MG/ML
4 INJECTION INTRAMUSCULAR; INTRAVENOUS EVERY 8 HOURS PRN
Status: DISCONTINUED | OUTPATIENT
Start: 2020-08-12 | End: 2020-08-15 | Stop reason: HOSPADM

## 2020-08-12 RX ORDER — IBUPROFEN 200 MG
16 TABLET ORAL
Status: DISCONTINUED | OUTPATIENT
Start: 2020-08-12 | End: 2020-08-15 | Stop reason: HOSPADM

## 2020-08-12 RX ORDER — MORPHINE SULFATE 2 MG/ML
2 INJECTION, SOLUTION INTRAMUSCULAR; INTRAVENOUS EVERY 6 HOURS PRN
Status: DISCONTINUED | OUTPATIENT
Start: 2020-08-12 | End: 2020-08-15 | Stop reason: HOSPADM

## 2020-08-12 RX ORDER — ONDANSETRON 2 MG/ML
4 INJECTION INTRAMUSCULAR; INTRAVENOUS
Status: COMPLETED | OUTPATIENT
Start: 2020-08-12 | End: 2020-08-12

## 2020-08-12 RX ORDER — OXYCODONE HYDROCHLORIDE 5 MG/1
5 TABLET ORAL EVERY 6 HOURS PRN
Status: DISCONTINUED | OUTPATIENT
Start: 2020-08-12 | End: 2020-08-15 | Stop reason: HOSPADM

## 2020-08-12 RX ORDER — AMLODIPINE BESYLATE 5 MG/1
5 TABLET ORAL DAILY
Status: DISCONTINUED | OUTPATIENT
Start: 2020-08-12 | End: 2020-08-15 | Stop reason: HOSPADM

## 2020-08-12 RX ORDER — ACETAMINOPHEN 500 MG
500 TABLET ORAL EVERY 6 HOURS PRN
Status: DISCONTINUED | OUTPATIENT
Start: 2020-08-12 | End: 2020-08-15 | Stop reason: HOSPADM

## 2020-08-12 RX ORDER — PROCHLORPERAZINE EDISYLATE 5 MG/ML
5 INJECTION INTRAMUSCULAR; INTRAVENOUS EVERY 6 HOURS PRN
Status: DISCONTINUED | OUTPATIENT
Start: 2020-08-12 | End: 2020-08-15 | Stop reason: HOSPADM

## 2020-08-12 RX ORDER — TRAZODONE HYDROCHLORIDE 50 MG/1
50 TABLET ORAL NIGHTLY
Status: DISCONTINUED | OUTPATIENT
Start: 2020-08-12 | End: 2020-08-15 | Stop reason: HOSPADM

## 2020-08-12 RX ORDER — ENOXAPARIN SODIUM 100 MG/ML
40 INJECTION SUBCUTANEOUS EVERY 24 HOURS
Status: DISCONTINUED | OUTPATIENT
Start: 2020-08-12 | End: 2020-08-15 | Stop reason: HOSPADM

## 2020-08-12 RX ORDER — IBUPROFEN 200 MG
24 TABLET ORAL
Status: DISCONTINUED | OUTPATIENT
Start: 2020-08-12 | End: 2020-08-15 | Stop reason: HOSPADM

## 2020-08-12 RX ORDER — GABAPENTIN 300 MG/1
300 CAPSULE ORAL 3 TIMES DAILY
Status: DISCONTINUED | OUTPATIENT
Start: 2020-08-12 | End: 2020-08-15 | Stop reason: HOSPADM

## 2020-08-12 RX ORDER — SODIUM CHLORIDE 0.9 % (FLUSH) 0.9 %
10 SYRINGE (ML) INJECTION
Status: DISCONTINUED | OUTPATIENT
Start: 2020-08-12 | End: 2020-08-15 | Stop reason: HOSPADM

## 2020-08-12 RX ORDER — GLUCAGON 1 MG
1 KIT INJECTION
Status: DISCONTINUED | OUTPATIENT
Start: 2020-08-12 | End: 2020-08-15 | Stop reason: HOSPADM

## 2020-08-12 RX ORDER — CEFTRIAXONE 1 G/1
1 INJECTION, POWDER, FOR SOLUTION INTRAMUSCULAR; INTRAVENOUS
Status: COMPLETED | OUTPATIENT
Start: 2020-08-12 | End: 2020-08-12

## 2020-08-12 RX ADMIN — AMLODIPINE BESYLATE 5 MG: 5 TABLET ORAL at 01:08

## 2020-08-12 RX ADMIN — GABAPENTIN 300 MG: 300 CAPSULE ORAL at 03:08

## 2020-08-12 RX ADMIN — TRAZODONE HYDROCHLORIDE 50 MG: 50 TABLET ORAL at 09:08

## 2020-08-12 RX ADMIN — GABAPENTIN 300 MG: 300 CAPSULE ORAL at 09:08

## 2020-08-12 RX ADMIN — MORPHINE SULFATE 4 MG: 4 INJECTION INTRAVENOUS at 09:08

## 2020-08-12 RX ADMIN — IOHEXOL 100 ML: 350 INJECTION, SOLUTION INTRAVENOUS at 06:08

## 2020-08-12 RX ADMIN — CEFTRIAXONE SODIUM 1 G: 1 INJECTION, POWDER, FOR SOLUTION INTRAMUSCULAR; INTRAVENOUS at 09:08

## 2020-08-12 RX ADMIN — ONDANSETRON 4 MG: 2 INJECTION INTRAMUSCULAR; INTRAVENOUS at 06:08

## 2020-08-12 RX ADMIN — OXYCODONE 5 MG: 5 TABLET ORAL at 06:08

## 2020-08-12 NOTE — CONSULTS
Radiology Consult    Edita Riley is a 55 y.o. female with a history of cervical cancer s/p bilateral nephrostomy tube placement. Patient presents to ED with abdominal pain. CT revealed interval development of left sided hydronephrosis. Patient reportedly with decreasing output from left nephrostomy tube. IR consulted for further evaluation.    Past Medical History:   Diagnosis Date    Anxiety     Cervical cancer 2014    Chronic back pain     Depression     Diarrhea due to malabsorption 11/14/2018    Fibromyalgia     GERD (gastroesophageal reflux disease)     Hiatal hernia 2014    History of cervical cancer 10/11/2018    Hx of psychiatric care     Cymbalta, trazodone    Hypertension     Hypomagnesemia 11/21/2018    Lactose intolerance     Metastatic squamous cell carcinoma to lymph node 10/11/2018    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 drug 11/14/2018    Osteoarthritis of back     Psychiatric problem     Stroke 1972     Past Surgical History:   Procedure Laterality Date    BILATERAL OOPHORECTOMY  2015    CHOLECYSTECTOMY  11/09/2016    Done at [facility], path showed chronic cholecystitis and gallstones    cold knife conization  2014    COLONOSCOPY  2014    [facility]    COLONOSCOPY N/A 5/18/2018    Procedure: COLONOSCOPY;  Surgeon: Arden Gutiérrez MD;  Location: [facility] (4TH FLR);  Service: Endoscopy;  Laterality: N/A;    COLONOSCOPY N/A 7/28/2020    Procedure: COLONOSCOPY;  Surgeon: Hammad Reynolds MD;  Location: [facility] (4TH FLR);  Service: Colon and Rectal;  Laterality: N/A;  covid test [place] 7/25    CYSTOSCOPY WITH URETEROSCOPY, RETROGRADE PYELOGRAPHY, AND INSERTION OF STENT Bilateral 3/21/2020    Procedure: CYSTOSCOPY, WITH RETROGRADE PYELOGRAM,;  Surgeon: Leslie Balbuena MD;  Location: [facility] OR [facility] FLR;  Service: Urology;  Laterality: Bilateral;    ESOPHAGOGASTRODUODENOSCOPY  2014    HYSTERECTOMY  2014    TVH for cervical  cancer    OOPHORECTOMY      RETROPERITONEAL LYMPHADENECTOMY Right 9/17/2018    Procedure: LYMPHADENECTOMY, RETROPERITONEUM;  Surgeon: Sebas Reed MD;  Location: [facility] OR [address] [place] [address];  Service: General;  Laterality: Right;  ILIAC       Discussed with primary team.    Imaging reviewed with Radiology staff.     Procedure: Left nephrostomy tube change.    Scheduled Meds:    amLODIPine  5 mg Oral Daily    enoxaparin  40 mg Subcutaneous Q24H    gabapentin  300 mg Oral TID    traZODone  50 mg Oral QHS     Continuous Infusions:   PRN Meds:acetaminophen, dextrose 50%, dextrose 50%, glucagon (human recombinant), glucose, glucose, morphine, ondansetron, oxyCODONE, prochlorperazine, sodium chloride 0.9%    Allergies:   Review of patient's allergies indicates:   Allergen Reactions    Bee sting [allergen ext-venom-honey bee]      Rash      Grass pollen-bermuda, standard      rash       Labs:  No results for input(s): INR in the last 168 hours.    Invalid input(s):  PT,  PTT    Recent Labs   Lab 08/12/20  0629 08/12/20  0631   WBC 8.78  --    HGB 11.3*  --    HCT 38.1 35*   MCV 91  --      --       Recent Labs   Lab 08/12/20  0840         K 3.6      CO2 25   BUN 11   CREATININE 1.1   CALCIUM 8.9   ALT 11   AST 12   ALBUMIN 3.0*   BILITOT 0.3         Vitals (Most Recent):  Temp: 97.9 °F (36.6 °C) (08/12/20 0540)  Pulse: 74 (08/12/20 1101)  Resp: 18 (08/12/20 0934)  BP: (!) 162/78 (08/12/20 1101)  SpO2: 99 % (08/12/20 1101)    Plan:   1. NPO after midnight.  2. Hold anticoagulants.  3. Bilateral nephrostomy tube change scheduled for tomorrow 8/13/20.    Prosper Lobo MD  PGY-IV Radiology Resident  Pager: [phone]

## 2020-08-12 NOTE — H&P
History and Physical   Hospital Medicine     Patient Name: Edita Riley  MRN:  [id number]  [place] Medicine [place] [facility] [facility] [place] Adelina Leo MD  Date of Admission:  8/12/2020     Principal Problem:  Nephrostomy tube displaced   Primary Care Physician: Audrey Mustafa MD      History of Present Illness:     Ms. Edita Riley is a 55 y.o. female with hx of cervical cancer s/p chemo and radiation with radiation cystitis  and bilateral nephrostomy tubes in place, presented to the ED on 8/12 with 1/2 day hx of nausea and vomiting that started in the middle of the night. These symptoms are associated with L sided flank pain. Patient stated that new nephrostomy tubes have been draining appropriately but L tube has had decreased UOP, she denies any fevers or chills. In the ED, CT revealed interval development of left sided hydronephrosis. Patient admitted and IR consulted for Left nephrostomy tube change. VSS with no leukocytosis, LA wnl. UA dirty is likely with colonization     Review of Systems   Constitutional: Negative for chills, fatigue, fever.   HENT: Negative for sore throat, trouble swallowing.    Eyes: Negative for photophobia, visual disturbance.   Respiratory: Negative for cough, shortness of breath.    Cardiovascular: Negative for chest pain, palpitations, leg swelling.   Gastrointestinal: Negative for, constipation, diarrhea, ++nausea, vomiting. + abdominal pain  Endocrine: Negative for cold intolerance, heat intolerance.   Genitourinary: Negative for dysuria, frequency.   Musculoskeletal: Negative for arthralgias, myalgias.   Skin: Negative for rash, wound, erythema   Neurological: Negative for dizziness, syncope, weakness, light-headedness.   Psychiatric/Behavioral: Negative for confusion, hallucinations, anxiety  All other systems reviewed and are negative.      Past Medical History:   Past Medical History:   Diagnosis Date    Anxiety     Cervical cancer 2014     Chronic back pain     Depression     Diarrhea due to malabsorption 11/14/2018    Fibromyalgia     GERD (gastroesophageal reflux disease)     Hiatal hernia 2014    History of cervical cancer 10/11/2018    Hx of psychiatric care     Cymbalta, trazodone    Hypertension     Hypomagnesemia 11/21/2018    Lactose intolerance     Metastatic squamous cell carcinoma to lymph node 10/11/2018    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 drug 11/14/2018    Osteoarthritis of back     Psychiatric problem     Stroke 1972       Past Surgical History:   Past Surgical History:   Procedure Laterality Date    BILATERAL OOPHORECTOMY  2015    CHOLECYSTECTOMY  11/09/2016    Done at [facility], path showed chronic cholecystitis and gallstones    cold knife conization  2014    COLONOSCOPY  2014    [facility]    COLONOSCOPY N/A 5/18/2018    Procedure: COLONOSCOPY;  Surgeon: Arden Gutiérrez MD;  Location: [facility] ENDO (4TH FLR);  Service: Endoscopy;  Laterality: N/A;    COLONOSCOPY N/A 7/28/2020    Procedure: COLONOSCOPY;  Surgeon: Hammad Reynolds MD;  Location: [facility] ENDO (4TH FLR);  Service: Colon and Rectal;  Laterality: N/A;  covid test [place] 7/25    CYSTOSCOPY WITH URETEROSCOPY, RETROGRADE PYELOGRAPHY, AND INSERTION OF STENT Bilateral 3/21/2020    Procedure: CYSTOSCOPY, WITH RETROGRADE PYELOGRAM,;  Surgeon: Leslie Balbuena MD;  Location: [facility] OR 1ST FLR;  Service: Urology;  Laterality: Bilateral;    ESOPHAGOGASTRODUODENOSCOPY  2014    HYSTERECTOMY  2014    [facility] for cervical cancer    OOPHORECTOMY      RETROPERITONEAL LYMPHADENECTOMY Right 9/17/2018    Procedure: LYMPHADENECTOMY, RETROPERITONEUM;  Surgeon: Sebas Reed MD;  Location: [facility] OR 2ND FLR;  Service: General;  Laterality: Right;  ILIAC       Social History:   Social History     Tobacco Use    Smoking status: Former Smoker    Smokeless tobacco: Never Used   Substance Use Topics    Alcohol use: No     Alcohol/week:  0.0 standard drinks    Drug use: No       Family History:   Family History   Problem Relation Age of Onset    Heart disease Sister     Heart disease Maternal Grandmother     Colon cancer Father     Esophageal cancer Father     Cancer Father         Lung-smoker    Cancer Mother         Cervical    Cervical cancer Mother     Breast cancer Maternal Aunt     Suicide Daughter         jumped from parking structure    Drug abuse Daughter     Drug abuse Daughter     Rectal cancer Neg Hx     Stomach cancer Neg Hx     Crohn's disease Neg Hx     Ulcerative colitis Neg Hx     Diabetes Neg Hx     Hypertension Neg Hx          Medications: Scheduled Meds:   amLODIPine  5 mg Oral Daily    enoxaparin  40 mg Subcutaneous Q24H    gabapentin  300 mg Oral TID    traZODone  50 mg Oral QHS     Continuous Infusions:  PRN Meds:.acetaminophen, dextrose 50%, dextrose 50%, glucagon (human recombinant), glucose, glucose, morphine, ondansetron, oxyCODONE, prochlorperazine, sodium chloride 0.9%    Allergies: Patient is allergic to bee sting [allergen ext-venom-honey bee] and grass pollen-bermuda, standard.    Physical Exam:     Vital Signs (Most Recent):  Temp: 97.5 °F (36.4 °C) (08/12/20 1150)  Pulse: 86 (08/12/20 1150)  Resp: 20 (08/12/20 1150)  BP: 112/66 (08/12/20 1150)  SpO2: 98 % (08/12/20 1150) Vital Signs Range (Last 24H):  Temp:  [97.4 °F (36.3 °C)-97.9 °F (36.6 °C)]   Pulse:  [74-95]   Resp:  [16-20]   BP: (112-162)/(65-84)   SpO2:  [95 %-100 %]    Body mass index is 30.27 kg/m².     Temp:  [97.4 °F (36.3 °C)-97.9 °F (36.6 °C)]   Pulse:  [74-95]   Resp:  [16-20]   BP: (112-162)/(65-84)   SpO2:  [95 %-100 %]         Physical Exam:  Constitutional: Well-developed, well-nourished, non-distressed, not diaphoretic.   HENT: NC/AT, external ears normal, oropharynx clear, MMM w/o exudates.   Eyes: PERRL, EOMI, conjunctiva normal, no discharge b/l, no scleral icterus   Neck: normal ROM, supple  CV: RRR, no m/r/g, no carotid  bruits, +2 peripheral pulses.  Pulmonary/Chest wall: Breathing comfortably w/o distress, CTAB, no w/r/r, no crackles.  GI: Soft, non-tender, non-distended  Musculoskeletal: Normal ROM, no edema, no atrophy, no tenderness throughout  + nephrostomy tubes in place bilaterally, left tube appears dislodged   Neurological: AAO x 4, CN II-XI in tact, nl sensation, nl strength/tone   Skin: warm, dry, (-) erythema  Psych: normal mood and affect, normal behavior, thought content and judgement.  Vitals reviewed.    Labs:      Chemistries:   Recent Labs   Lab 08/06/20 1737 08/12/20  0840    142   K 4.3 3.6    108   CO2 27 25   BUN 11 11   CREATININE 1.2 1.1   CALCIUM 10.2 8.9   PROT  --  7.0   BILITOT  --  0.3   ALKPHOS  --  84   ALT  --  11   AST  --  12        WBC:   Recent Labs   Lab 08/06/20 1737 08/12/20  0629   WBC 10.43 8.78     Bands:     CBC/Anemia Labs: Coags:    Recent Labs   Lab 08/06/20 1737 08/12/20  0629 08/12/20  0631   WBC 10.43 8.78  --    HGB 11.4* 11.3*  --    HCT 37.8 38.1 35*    338  --    MCV 89 91  --    RDW 14.2 14.5  --     No results for input(s): PT, INR, APTT in the last 168 hours.     CT Abdomen Pelvis With Contrast    1. When compared to the 08/02/2020 CT, there is increased left-sided hydronephrosis and hydroureter with urothelial enhancement and surrounding inflammatory changes.  No definite new urolithiasis appreciated.  2. Relatively similar degree of mild dilatation of the proximal right ureter with similar degree of periureteric inflammatory stranding.  3. Otherwise, no significant interval change.       Assessment and Plan:     Ms. Edita Riley is a 55 y.o. female who presented to [facility] on 8/12/2020 with  Nephrostomy tube displaced     Active Hospital Problems    Diagnosis  POA    *Nephrostomy tube displaced [T83.022A]  Yes    History of cervical cancer [Z85.41]  Not Applicable     Chronic    Essential hypertension [I10]  Yes     Chronic      Resolved  Hospital Problems   No resolved problems to display.       Nephrostomy tube displaced  History of cervical cancer  - CT revealed interval development of left sided hydronephrosis.  - IR consulted - planned for Left Left nephrostomy tube change  - pain control and nausea control with PO and IV meds  - can likely be d/c post Left nephrostomy tube change and If pain is well controlled     Essential hypertension  - cont home meds  - lisinopril held given the patient received IV contrast and 1 to ensure that she does not develop Riki      Diet:  Regular , NPO after mn  GI PPx:    DVT PPx:    Goals of Care:      High Risk Conditions:  Nephrostomy tube displaced      Disposition:    After IR procedures, likely d/c home on 8/13      Signing Physician:     Adelina Leo MD  Department of Hospital Medicine   [facility]  Pager [phone] [phone] [phone]  8/12/2020

## 2020-08-12 NOTE — PLAN OF CARE
POC reviewed with pt, pt verbalized understanding. Safety maintained throughout shift, bed locked and in lowest position, call light in reach, Side rails up X 2. Non skid sock on when OOB. Pt remained free of fall/trauma. Pt self reports of pain treated with PO pain medication as ordered. Nephrostomy tubes present to bilateral flank areas, draining clear yellow urine.   VSS, pt remained afebrile this shift.  Pt tolerating meals wells, no reports of nausea and vomiting. Pt informed that she is to be NPO at midnight  Will continue to monitor.

## 2020-08-13 LAB
ANION GAP SERPL CALC-SCNC: 8 MMOL/L (ref 8–16)
BACTERIA UR CULT: NORMAL
BUN SERPL-MCNC: 8 MG/DL (ref 6–20)
CALCIUM SERPL-MCNC: 9 MG/DL (ref 8.7–10.5)
CHLORIDE SERPL-SCNC: 101 MMOL/L (ref 95–110)
CO2 SERPL-SCNC: 28 MMOL/L (ref 23–29)
CREAT SERPL-MCNC: 1 MG/DL (ref 0.5–1.4)
EST. GFR  (AFRICAN AMERICAN): >60 ML/MIN/1.73 M^2
EST. GFR  (NON AFRICAN AMERICAN): >60 ML/MIN/1.73 M^2
GLUCOSE SERPL-MCNC: 94 MG/DL (ref 70–110)
POTASSIUM SERPL-SCNC: 3.6 MMOL/L (ref 3.5–5.1)
SODIUM SERPL-SCNC: 137 MMOL/L (ref 136–145)

## 2020-08-13 PROCEDURE — 63600175 PHARM REV CODE 636 W HCPCS: Performed by: STUDENT IN AN ORGANIZED HEALTH CARE EDUCATION/TRAINING PROGRAM

## 2020-08-13 PROCEDURE — 63600175 PHARM REV CODE 636 W HCPCS: Performed by: HOSPITALIST

## 2020-08-13 PROCEDURE — 99233 PR SUBSEQUENT HOSPITAL CARE,LEVL III: ICD-10-PCS | Mod: ,,, | Performed by: HOSPITALIST

## 2020-08-13 PROCEDURE — 36415 COLL VENOUS BLD VENIPUNCTURE: CPT

## 2020-08-13 PROCEDURE — 99233 SBSQ HOSP IP/OBS HIGH 50: CPT | Mod: ,,, | Performed by: HOSPITALIST

## 2020-08-13 PROCEDURE — 11000001 HC ACUTE MED/SURG PRIVATE ROOM

## 2020-08-13 PROCEDURE — 25500020 PHARM REV CODE 255: Performed by: HOSPITALIST

## 2020-08-13 PROCEDURE — 80048 BASIC METABOLIC PNL TOTAL CA: CPT

## 2020-08-13 PROCEDURE — 25000003 PHARM REV CODE 250: Performed by: HOSPITALIST

## 2020-08-13 RX ORDER — CEFTRIAXONE 1 G/50ML
INJECTION, SOLUTION INTRAVENOUS
Status: COMPLETED | OUTPATIENT
Start: 2020-08-13 | End: 2020-08-13

## 2020-08-13 RX ORDER — MIDAZOLAM HYDROCHLORIDE 1 MG/ML
INJECTION INTRAMUSCULAR; INTRAVENOUS CODE/TRAUMA/SEDATION MEDICATION
Status: COMPLETED | OUTPATIENT
Start: 2020-08-13 | End: 2020-08-13

## 2020-08-13 RX ORDER — FENTANYL CITRATE 50 UG/ML
INJECTION, SOLUTION INTRAMUSCULAR; INTRAVENOUS CODE/TRAUMA/SEDATION MEDICATION
Status: COMPLETED | OUTPATIENT
Start: 2020-08-13 | End: 2020-08-13

## 2020-08-13 RX ADMIN — GABAPENTIN 300 MG: 300 CAPSULE ORAL at 09:08

## 2020-08-13 RX ADMIN — OXYCODONE 5 MG: 5 TABLET ORAL at 05:08

## 2020-08-13 RX ADMIN — ENOXAPARIN SODIUM 40 MG: 40 INJECTION SUBCUTANEOUS at 05:08

## 2020-08-13 RX ADMIN — IOHEXOL 20 ML: 300 INJECTION, SOLUTION INTRAVENOUS at 08:08

## 2020-08-13 RX ADMIN — GABAPENTIN 300 MG: 300 CAPSULE ORAL at 04:08

## 2020-08-13 RX ADMIN — CEFTRIAXONE 1 G: 1 INJECTION, SOLUTION INTRAVENOUS at 08:08

## 2020-08-13 RX ADMIN — FENTANYL CITRATE 50 MCG: 50 INJECTION INTRAMUSCULAR; INTRAVENOUS at 08:08

## 2020-08-13 RX ADMIN — TRAZODONE HYDROCHLORIDE 50 MG: 50 TABLET ORAL at 09:08

## 2020-08-13 RX ADMIN — MIDAZOLAM HYDROCHLORIDE 1 MG: 1 INJECTION, SOLUTION INTRAMUSCULAR; INTRAVENOUS at 08:08

## 2020-08-13 NOTE — PLAN OF CARE
CM met with patient for discharge planning.  Patient states she lives with her  and daughter in a first floor apartment with four steps to enter with a railing.  Patient states her spouse and daughter will provide help to her if she needs it upon discharge.  Her spouse will also provide transport home.  Patient uses a rolling walker to help her ambulate.  Plan is to discharge home with family.    Pharmacy:    [facility]/pharm[facility] #[id number] - [place], [place] - [address]  [address]  [place] [address]  Phone: [phone] Fax: [phone]    PCP:  Audrey Mustafa MD    Payor: MEDICAID / Plan: [facility] / Product Type: Managed Medicaid /      08/13/20 1245   Discharge Assessment   Assessment Type Discharge Planning Assessment   Confirmed/corrected address and phone number on facesheet? Yes   Assessment information obtained from? Patient   Expected Length of Stay (days) 3   Prior to hospitilization cognitive status: Alert/Oriented   Prior to hospitalization functional status: Assistive Equipment  (RW)   Current cognitive status: Alert/Oriented   Current Functional Status: Assistive Equipment   Facility Arrived From: Emergency room   Lives With spouse;child(abigail), adult   Able to Return to Prior Arrangements yes   Is patient able to care for self after discharge? Unable to determine at this time (comments)   Patient's perception of discharge disposition home or selfcare   Readmission Within the Last 30 Days no previous admission in last 30 days   Patient currently being followed by outpatient case management? No   Patient currently receives any other outside agency services? No   Equipment Currently Used at Home walker, rolling   Do you have any problems affording any of your prescribed medications? No   Is the patient taking medications as prescribed? yes   Does the patient have transportation home? Yes   Transportation Anticipated family or friend will provide   Does the  patient receive services at the [facility]? No   Discharge Plan A Home with family   Discharge Plan B Home   DME Needed Upon Discharge  none   Patient/Family in Agreement with Plan yes

## 2020-08-13 NOTE — PLAN OF CARE
Problem: Adult Inpatient Plan of Care  Goal: Plan of Care Review  Outcome: Ongoing, Not Progressing     Problem: Adult Inpatient Plan of Care  Goal: Optimal Comfort and Wellbeing  Outcome: Ongoing, Not Progressing     Problem: Renal Function Impairment (Acute Kidney Injury/Impairment)  Goal: Effective Renal Function  Outcome: Ongoing, Not Progressing     AAOx4. Bilat nephrostomy tubes secured and patent; draining clear yellow urine. Prn pain management effective. No complaints voiced.

## 2020-08-13 NOTE — NURSING
Late entry,0745. AAOx4. [facility]  for procedural unit. No acute issues present.

## 2020-08-13 NOTE — NURSING
Bilateral neph tubes changed, pt tolerated well, report called to pts nurse and bedside report given to Connie RN in [facility]

## 2020-08-13 NOTE — NURSING
Returned to [facility]MTA unit. AAOx4. Bilat nephrostomy tubes secured and patent. No acute issues present.

## 2020-08-13 NOTE — PLAN OF CARE
Problem: Adult Inpatient Plan of Care  Goal: Plan of Care Review  Outcome: Ongoing, Progressing     Patient aaox4. Vitals wnl. No complaints of pain this shift. Kept npo past midnight for planned procedure. Bilateral nephrostomy tubes draining clear yellow urine. Patient able to make needs known to staff. Bed in lowest position. Call light within reach. Monitoring.

## 2020-08-13 NOTE — NURSING
Pt arrived to IR Room 189 for bilateral nephrostomy tube change. Pt oriented to unit and staff. Plan of care reviewed with patient, patient verbalizes understanding. Comfort measures utilized. Pt safely transferred from stretcher to procedural table. Fall risk reviewed with patient, fall risk interventions maintained. Safety strap applied, positioner pillows utilized to minimize pressure points. Blankets applied. Pt prepped and draped utilizing standard sterile technique. Patient placed on continuous monitoring, as required by sedation policy. Timeouts completed utilizing standard universal time-out, per department and facility policy. RN to remain at bedside, continuous monitoring maintained. Pt resting comfortably. Denies pain/discomfort. Will continue to monitor. See flow sheets for monitoring, medication administration, and updates.

## 2020-08-14 LAB
ANION GAP SERPL CALC-SCNC: 8 MMOL/L (ref 8–16)
BUN SERPL-MCNC: 9 MG/DL (ref 6–20)
CALCIUM SERPL-MCNC: 8.8 MG/DL (ref 8.7–10.5)
CHLORIDE SERPL-SCNC: 103 MMOL/L (ref 95–110)
CO2 SERPL-SCNC: 28 MMOL/L (ref 23–29)
CREAT SERPL-MCNC: 1.1 MG/DL (ref 0.5–1.4)
EST. GFR  (AFRICAN AMERICAN): >60 ML/MIN/1.73 M^2
EST. GFR  (NON AFRICAN AMERICAN): 55.9 ML/MIN/1.73 M^2
GLUCOSE SERPL-MCNC: 94 MG/DL (ref 70–110)
POTASSIUM SERPL-SCNC: 4 MMOL/L (ref 3.5–5.1)
SODIUM SERPL-SCNC: 139 MMOL/L (ref 136–145)

## 2020-08-14 PROCEDURE — 11000001 HC ACUTE MED/SURG PRIVATE ROOM

## 2020-08-14 PROCEDURE — 99232 PR SUBSEQUENT HOSPITAL CARE,LEVL II: ICD-10-PCS | Mod: ,,, | Performed by: HOSPITALIST

## 2020-08-14 PROCEDURE — 94761 N-INVAS EAR/PLS OXIMETRY MLT: CPT

## 2020-08-14 PROCEDURE — 80048 BASIC METABOLIC PNL TOTAL CA: CPT

## 2020-08-14 PROCEDURE — 25000003 PHARM REV CODE 250: Performed by: HOSPITALIST

## 2020-08-14 PROCEDURE — 99232 SBSQ HOSP IP/OBS MODERATE 35: CPT | Mod: ,,, | Performed by: HOSPITALIST

## 2020-08-14 PROCEDURE — 36415 COLL VENOUS BLD VENIPUNCTURE: CPT

## 2020-08-14 RX ADMIN — GABAPENTIN 300 MG: 300 CAPSULE ORAL at 04:08

## 2020-08-14 RX ADMIN — AMLODIPINE BESYLATE 5 MG: 5 TABLET ORAL at 10:08

## 2020-08-14 RX ADMIN — GABAPENTIN 300 MG: 300 CAPSULE ORAL at 10:08

## 2020-08-14 RX ADMIN — ACETAMINOPHEN 500 MG: 500 TABLET ORAL at 10:08

## 2020-08-14 RX ADMIN — OXYCODONE 5 MG: 5 TABLET ORAL at 09:08

## 2020-08-14 RX ADMIN — SODIUM CHLORIDE 1000 ML: 0.9 INJECTION, SOLUTION INTRAVENOUS at 03:08

## 2020-08-14 RX ADMIN — GABAPENTIN 300 MG: 300 CAPSULE ORAL at 09:08

## 2020-08-14 RX ADMIN — TRAZODONE HYDROCHLORIDE 50 MG: 50 TABLET ORAL at 09:08

## 2020-08-14 NOTE — PLAN OF CARE
Patient is being d/c today with no Social Service needs identified at this time.        08/14/20 1329   Post-Acute Status   Post-Acute Authorization Other   Other Status No Post-Acute Service Needs       Johanny Menezes LMSW   - [facility]  Ext. [phone]

## 2020-08-14 NOTE — PLAN OF CARE
Problem: Adult Inpatient Plan of Care  Goal: Plan of Care Review  Outcome: Ongoing, Progressing     Problem: Fall Injury Risk  Goal: Absence of Fall and Fall-Related Injury  Outcome: Ongoing, Progressing     POC reviewed with pt stated understanding VSS afebrile no c/o pain or discomfort tolerated medications without difficulty call light within reach is able to state needs Nephrostomy tubes patent urine yellow no odor. Safety maintained during shift.

## 2020-08-14 NOTE — PROGRESS NOTES
Progress Note   Hospital Medicine         Patient Name: Edita Riley  MRN:  [id number]  [facility] Hospital Medicine Team: [facility] [place] [facility] X Ynoi Egan MD  Date of Admission:  8/12/2020     Length of Stay:  LOS: 1 day   Expected Discharge Date: 8/13/2020  Principal Problem:  Nephrostomy tube displaced       Subjective:     Interval History/Overnight Events:  Patient doing well today, s/p nephro tube replacements; patient was suffering from N/V prior placement, which is chronic; advance diet and monitor over night;     Review of Systems   Constitutional: Negative for chills, fatigue, fever.   HENT: Negative for sore throat, trouble swallowing.    Eyes: Negative for photophobia, visual disturbance.   Respiratory: Negative for cough, shortness of breath.    Cardiovascular: Negative for chest pain, palpitations, leg swelling.   Gastrointestinal: Negative for abdominal pain, constipation, diarrhea, nausea, vomiting.   Endocrine: Negative for cold intolerance, heat intolerance.   Genitourinary: Negative for dysuria, frequency.   Musculoskeletal: Negative for arthralgias, myalgias.   Skin: Negative for rash, wound, erythema   Neurological: Negative for dizziness, syncope, weakness, light-headedness.   Psychiatric/Behavioral: Negative for confusion, hallucinations, anxiety  All other systems reviewed and are negative.    Objective:     Temp:  [97.5 °F (36.4 °C)-98.4 °F (36.9 °C)]   Pulse:  [66-88]   Resp:  [15-18]   BP: (101-128)/(56-65)   SpO2:  [96 %-100 %]       Physical Exam:  Constitutional: appears weak and ill   Head: Normocephalic and atraumatic.   Mouth/Throat: Oropharynx is clear and moist.   Eyes: EOM are normal. Pupils are equal, round, and reactive to light. No scleral icterus.   Neck: Normal range of motion. Neck supple.   Cardiovascular: Normal rate and regular rhythm.  No murmur heard.  Pulmonary/Chest: Effort normal and breath sounds normal. No respiratory distress. No wheezes, rales, or rhonchi  Abdominal:  Soft. Bowel sounds are normal.  No distension or tenderness  Musculoskeletal: Normal range of motion. No edema.   Neurological: Alert and oriented to person, place, and time.   Skin: Skin is warm and dry.   Psychiatric: Normal mood and affect. Behavior is normal.     Recent Labs   Lab 08/12/20  0629 08/12/20  0631   WBC 8.78  --    HGB 11.3*  --    HCT 38.1 35*     --      Recent Labs   Lab 08/12/20  0840 08/13/20  0332    137   K 3.6 3.6    101   CO2 25 28   BUN 11 8   CREATININE 1.1 1.0    94   CALCIUM 8.9 9.0     Recent Labs   Lab 08/12/20  0840   ALKPHOS 84   ALT 11   AST 12   ALBUMIN 3.0*   PROT 7.0   BILITOT 0.3     No results for input(s): POCTGLUCOSE in the last 168 hours.     amLODIPine  5 mg Oral Daily    enoxaparin  40 mg Subcutaneous Q24H    gabapentin  300 mg Oral TID    traZODone  50 mg Oral QHS       Assessment and Plan     Ms. Edita Riley is a 55 y.o. female who presented to [facility] on 8/12/2020 with     Hospital Course:    Ms. Edita Riley was admitted to Hospital Medicine for management of     Active Hospital Problems    Diagnosis  POA    *Nephrostomy tube displaced [T83.022A]  Yes    History of cervical cancer [Z85.41]  Not Applicable     Chronic    Essential hypertension [I10]  Yes     Chronic      Resolved Hospital Problems   No resolved problems to display.       Nephrostomy tube displaced  History of cervical cancer  - CT revealed interval development of left sided hydronephrosis.  - s/p IR nephrostomy tube placement   - pain control and nausea control with PO and IV meds  -       Essential hypertension  - cont home meds  - lisinopril held given the patient received IV contrast and 1 to ensure that she does not develop Riki      Diet:  Regular ,  GI PPx:    DVT PPx:    Goals of Care:       High Risk Conditions:  Nephrostomy tube displaced       Disposition:    After IR procedures, likely d/c home on 8/14       Yoni Egan  MD  Medical Director [facility] Medicine  Spectra:  [phone]  Pager: [phone]

## 2020-08-15 VITALS
WEIGHT: 205 LBS | RESPIRATION RATE: 18 BRPM | OXYGEN SATURATION: 97 % | DIASTOLIC BLOOD PRESSURE: 56 MMHG | TEMPERATURE: 98 F | HEIGHT: 69 IN | HEART RATE: 86 BPM | SYSTOLIC BLOOD PRESSURE: 121 MMHG | BODY MASS INDEX: 30.36 KG/M2

## 2020-08-15 LAB
ANION GAP SERPL CALC-SCNC: 8 MMOL/L (ref 8–16)
BUN SERPL-MCNC: 10 MG/DL (ref 6–20)
CALCIUM SERPL-MCNC: 8.7 MG/DL (ref 8.7–10.5)
CHLORIDE SERPL-SCNC: 104 MMOL/L (ref 95–110)
CO2 SERPL-SCNC: 27 MMOL/L (ref 23–29)
CREAT SERPL-MCNC: 1 MG/DL (ref 0.5–1.4)
EST. GFR  (AFRICAN AMERICAN): >60 ML/MIN/1.73 M^2
EST. GFR  (NON AFRICAN AMERICAN): >60 ML/MIN/1.73 M^2
GLUCOSE SERPL-MCNC: 107 MG/DL (ref 70–110)
POTASSIUM SERPL-SCNC: 3.8 MMOL/L (ref 3.5–5.1)
SODIUM SERPL-SCNC: 139 MMOL/L (ref 136–145)

## 2020-08-15 PROCEDURE — 80048 BASIC METABOLIC PNL TOTAL CA: CPT

## 2020-08-15 PROCEDURE — 36415 COLL VENOUS BLD VENIPUNCTURE: CPT

## 2020-08-15 PROCEDURE — 99239 HOSP IP/OBS DSCHRG MGMT >30: CPT | Mod: ,,, | Performed by: HOSPITALIST

## 2020-08-15 PROCEDURE — 63600175 PHARM REV CODE 636 W HCPCS: Performed by: HOSPITALIST

## 2020-08-15 PROCEDURE — 99239 PR HOSPITAL DISCHARGE DAY,>30 MIN: ICD-10-PCS | Mod: ,,, | Performed by: HOSPITALIST

## 2020-08-15 PROCEDURE — 25000003 PHARM REV CODE 250: Performed by: HOSPITALIST

## 2020-08-15 PROCEDURE — 94761 N-INVAS EAR/PLS OXIMETRY MLT: CPT

## 2020-08-15 RX ADMIN — MORPHINE SULFATE 2 MG: 2 INJECTION, SOLUTION INTRAMUSCULAR; INTRAVENOUS at 03:08

## 2020-08-15 RX ADMIN — GABAPENTIN 300 MG: 300 CAPSULE ORAL at 04:08

## 2020-08-15 RX ADMIN — AMLODIPINE BESYLATE 5 MG: 5 TABLET ORAL at 09:08

## 2020-08-15 RX ADMIN — GABAPENTIN 300 MG: 300 CAPSULE ORAL at 09:08

## 2020-08-15 RX ADMIN — ENOXAPARIN SODIUM 40 MG: 40 INJECTION SUBCUTANEOUS at 04:08

## 2020-08-15 RX ADMIN — OXYCODONE 5 MG: 5 TABLET ORAL at 12:08

## 2020-08-15 NOTE — PROGRESS NOTES
Progress Note   Hospital Medicine         Patient Name: Edita Riley  MRN:  [id number]  Hospital Medicine Team: [facility] X Yoni Egan MD  Date of Admission:  8/12/2020     Length of Stay:  LOS: 3 days   Expected Discharge Date: 8/14/2020  Principal Problem:  Nephrostomy tube displaced       Subjective:     Interval History/Overnight Events:  Patient feeling weak and dehydrated; plan on giving IVFs and d/c tomorrow     Review of Systems   Constitutional: Negative for chills, fatigue, fever.   HENT: Negative for sore throat, trouble swallowing.    Eyes: Negative for photophobia, visual disturbance.   Respiratory: Negative for cough, shortness of breath.    Cardiovascular: Negative for chest pain, palpitations, leg swelling.   Gastrointestinal: Negative for abdominal pain, constipation, diarrhea, nausea, vomiting.   Endocrine: Negative for cold intolerance, heat intolerance.   Genitourinary: Negative for dysuria, frequency.   Musculoskeletal: Negative for arthralgias, myalgias.   Skin: Negative for rash, wound, erythema   Neurological: Negative for dizziness, syncope, weakness, light-headedness.   Psychiatric/Behavioral: Negative for confusion, hallucinations, anxiety  All other systems reviewed and are negative.    Objective:     Temp:  [97.9 °F (36.6 °C)-98.3 °F (36.8 °C)]   Pulse:  [74-91]   Resp:  [12-18]   BP: (102-113)/(65-72)   SpO2:  [96 %-99 %]       Physical Exam:  Constitutional: appears weak and ill   Head: Normocephalic and atraumatic.   Mouth/Throat: Oropharynx is clear and moist.   Eyes: EOM are normal. Pupils are equal, round, and reactive to light. No scleral icterus.   Neck: Normal range of motion. Neck supple.   Cardiovascular: Normal rate and regular rhythm.  No murmur heard.  Pulmonary/Chest: Effort normal and breath sounds normal. No respiratory distress. No wheezes, rales, or rhonchi  Abdominal: Soft. Bowel sounds are normal.  No distension or tenderness  Musculoskeletal: Normal  range of motion. No edema.   Neurological: Alert and oriented to person, place, and time.   Skin: Skin is warm and dry.   Psychiatric: Normal mood and affect. Behavior is normal.     Recent Labs   Lab 08/12/20  0629 08/12/20  0631   WBC 8.78  --    HGB 11.3*  --    HCT 38.1 35*     --      Recent Labs   Lab 08/13/20  0332 08/14/20  0330 08/15/20  0451    139 139   K 3.6 4.0 3.8    103 104   CO2 28 28 27   BUN 8 9 10   CREATININE 1.0 1.1 1.0   GLU 94 94 107   CALCIUM 9.0 8.8 8.7     Recent Labs   Lab 08/12/20  0840   ALKPHOS 84   ALT 11   AST 12   ALBUMIN 3.0*   PROT 7.0   BILITOT 0.3     No results for input(s): POCTGLUCOSE in the last 168 hours.     amLODIPine  5 mg Oral Daily    enoxaparin  40 mg Subcutaneous Q24H    gabapentin  300 mg Oral TID    traZODone  50 mg Oral QHS       Assessment and Plan     Ms. Edita Riley is a 55 y.o. female who presented to [facility] on 8/12/2020 with     Hospital Course:    Ms. Edita Riley was admitted to Hospital Medicine for management of     Active Hospital Problems    Diagnosis  POA    *Nephrostomy tube displaced [T83.022A]  Yes    History of cervical cancer [Z85.41]  Not Applicable     Chronic    Essential hypertension [I10]  Yes     Chronic      Resolved Hospital Problems   No resolved problems to display.       Nephrostomy tube displaced  History of cervical cancer  - CT revealed interval development of left sided hydronephrosis.  - s/p IR nephrostomy tube placement   - pain control and nausea control with PO and IV meds  -       Essential hypertension  - cont home meds  - lisinopril held given the patient received IV contrast and 1 to ensure that she does not develop Riki      Diet:  Regular ,  GI PPx:    DVT PPx:    Goals of Care:       High Risk Conditions:  Nephrostomy tube displaced       Disposition:    After IR procedures, likely d/c home on 8/15       Yoni Egan MD  Medical Director [facility]  Medicine  Spectra:  [phone]  Pager: [phone]

## 2020-08-15 NOTE — PLAN OF CARE
Problem: Adult Inpatient Plan of Care  Goal: Plan of Care Review  Outcome: Ongoing, Progressing     Problem: Fall Injury Risk  Goal: Absence of Fall and Fall-Related Injury  Outcome: Ongoing, Progressing   POC reviewed with pt states understanding  VSS afebrile Sats 98% RA   C/O pain prn Oxycodone and Morphine given   Nephrostomy tubes patent urine clear yellow   Without odor safety maintained during shift.

## 2020-08-16 NOTE — PROGRESS NOTES
Progress Note   Hospital Medicine         Patient Name: Edita Riley  MRN:  [id number]  Hospital Medicine [place] [facility] [place] [facility] X Yoni Egan MD  Date of Admission:  8/12/2020     Length of Stay:  LOS: 3 days   Expected Discharge Date: 8/15/2020  Principal Problem:  Nephrostomy tube displaced       Subjective:     Interval History/Overnight Events:  Patient doing well; tubes are draining well; d/c home today     Review of Systems   Constitutional: Negative for chills, fatigue, fever.   HENT: Negative for sore throat, trouble swallowing.    Eyes: Negative for photophobia, visual disturbance.   Respiratory: Negative for cough, shortness of breath.    Cardiovascular: Negative for chest pain, palpitations, leg swelling.   Gastrointestinal: Negative for abdominal pain, constipation, diarrhea, nausea, vomiting.   Endocrine: Negative for cold intolerance, heat intolerance.   Genitourinary: Negative for dysuria, frequency.   Musculoskeletal: Negative for arthralgias, myalgias.   Skin: Negative for rash, wound, erythema   Neurological: Negative for dizziness, syncope, weakness, light-headedness.   Psychiatric/Behavioral: Negative for confusion, hallucinations, anxiety  All other systems reviewed and are negative.    Objective:     Temp:  [98 °F (36.7 °C)]   Pulse:  [86]   Resp:  [18]   BP: (121)/(56)   SpO2:  [97 %]       Physical Exam:  Constitutional: appears weak and ill   Head: Normocephalic and atraumatic.   Mouth/Throat: Oropharynx is clear and moist.   Eyes: EOM are normal. Pupils are equal, round, and reactive to light. No scleral icterus.   Neck: Normal range of motion. Neck supple.   Cardiovascular: Normal rate and regular rhythm.  No murmur heard.  Pulmonary/Chest: Effort normal and breath sounds normal. No respiratory distress. No wheezes, rales, or rhonchi  Abdominal: Soft. Bowel sounds are normal.  No distension or tenderness  Musculoskeletal: Normal range of motion. No edema.   Neurological: Alert  and oriented to person, place, and time.   Skin: Skin is warm and dry.   Psychiatric: Normal mood and affect. Behavior is normal.     Recent Labs   Lab 08/12/20  0629 08/12/20  0631   WBC 8.78  --    HGB 11.3*  --    HCT 38.1 35*     --      Recent Labs   Lab 08/13/20  0332 08/14/20  0330 08/15/20  0451    139 139   K 3.6 4.0 3.8    103 104   CO2 28 28 27   BUN 8 9 10   CREATININE 1.0 1.1 1.0   GLU 94 94 107   CALCIUM 9.0 8.8 8.7     Recent Labs   Lab 08/12/20  0840   ALKPHOS 84   ALT 11   AST 12   ALBUMIN 3.0*   PROT 7.0   BILITOT 0.3     No results for input(s): POCTGLUCOSE in the last 168 hours.        Assessment and Plan     Ms. Edita Riley is a 55 y.o. female who presented to [facility] on 8/12/2020 with     Hospital Course:    Ms. Ediat Riley was admitted to [facility] for management of     Active Hospital Problems    Diagnosis  POA    *Nephrostomy tube displaced [T83.022A]  Yes    History of cervical cancer [Z85.41]  Not Applicable     Chronic    Essential hypertension [I10]  Yes     Chronic      Resolved Hospital Problems   No resolved problems to display.       Nephrostomy tube displaced  History of cervical cancer  - CT revealed interval development of left sided hydronephrosis.  - s/p IR nephrostomy tube placement   - pain control and nausea control with PO and IV meds  -       Essential hypertension  - cont home meds  - lisinopril held given the patient received IV contrast and 1 to ensure that she does not develop Riki      Diet:  Regular ,  GI PPx:    DVT PPx:    Goals of Care:       High Risk Conditions:  Nephrostomy tube displaced       Disposition:    D/c home today        Yoni Egan MD  Medical Director [facility]  Spectra:  [phone]  Pager: [phone]

## 2020-08-16 NOTE — DISCHARGE SUMMARY
Discharge Summary  Hospital Medicine    Patient Name: Edita Riley  MRN:  [id number]  [place] [facility] [place] [facility] [place] Yoni Egan MD  Date of Admission:  8/12/2020     Date of Discharge:  08/16/2020  Length of Stay:  LOS: 3 days   Principal Problem:  Nephrostomy tube displaced     Active Hospital Problems    Diagnosis  POA    *Nephrostomy tube displaced [T83.022A]  Yes    History of cervical cancer [Z85.41]  Not Applicable     Chronic    Essential hypertension [I10]  Yes     Chronic      Resolved Hospital Problems   No resolved problems to display.       History of Present Illness:      Ms. Edita Riley is a 55 y.o. female with hx of cervical cancer s/p chemo and radiation with radiation cystitis  and bilateral nephrostomy tubes in place, presented to the ED on 8/12 with 1/2 day hx of nausea and vomiting that started in the middle of the night. These symptoms are associated with L sided flank pain. Patient stated that new nephrostomy tubes have been draining appropriately but L tube has had decreased UOP, she denies any fevers or chills. In the ED, CT revealed interval development of left sided hydronephrosis. Patient admitted and IR consulted for Left nephrostomy tube change. VSS with no leukocytosis, LA wnl. UA dirty is likely with colonization     Hospital Course of Principle Problem Addressed:       Nephrostomy tube displaced  History of cervical cancer  - CT revealed interval development of left sided hydronephrosis.  - s/p IR nephrostomy tube placement   - pain control and nausea control with PO and IV meds  -       Essential hypertension  - cont home meds  - lisinopril held given the patient received IV contrast and 1 to ensure that she does not develop Riki      Diet:  Regular ,  GI PPx:    DVT PPx:    Goals of Care:       High Risk Conditions:  Nephrostomy tube displaced       Disposition:    D/c home today         Yoni Egan MD  Medical Director [facility]  Medicine  Spectra:  [phone]  Pager: [phone]    Other Medical Problems Addressed in the Hospital:         Significant Diagnostic Tests/Imaging:     Recent Labs   Lab 08/12/20  0629 08/12/20  0631   WBC 8.78  --    HGB 11.3*  --    HCT 38.1 35*     --      Recent Labs   Lab 08/12/20  0840 08/13/20  0332 08/14/20  0330 08/15/20  0451    137 139 139   K 3.6 3.6 4.0 3.8    101 103 104   CO2 25 28 28 27   BUN 11 8 9 10   CREATININE 1.1 1.0 1.1 1.0   CALCIUM 8.9 9.0 8.8 8.7   PROT 7.0  --   --   --    BILITOT 0.3  --   --   --    ALKPHOS 84  --   --   --    ALT 11  --   --   --    AST 12  --   --   --          Special Treatments/Procedures:         Discharge Medications:      Discharge Medication List as of 8/15/2020  5:19 PM      CONTINUE these medications which have NOT CHANGED    Details   gabapentin (NEURONTIN) 300 MG capsule Take 1 capsule (300 mg total) by mouth 3 (three) times daily., Starting Tue 9/3/2019, Normal      omeprazole (PRILOSEC) 40 MG capsule Take 1 capsule (40 mg total) by mouth once daily., Starting Fri 7/10/2020, Until Sat 7/10/2021, Normal      oxyCODONE (ROXICODONE) 5 MG immediate release tablet Take 1 tablet (5 mg total) by mouth every 8 (eight) hours as needed for Pain., Starting Fri 6/12/2020, Print      traZODone (DESYREL) 50 MG tablet TAKE 1 TABLET (50 MG TOTAL) BY MOUTH EVERY EVENING., Starting Wed 11/13/2019, Until Thu 11/12/2020, Normal      ketoconazole (NIZORAL) 2 % cream Apply topically once daily to affected area, Starting Mon 7/20/2020, Normal      ondansetron (ZOFRAN-ODT) 4 MG TbDL Take 1 tablet (4 mg total) by mouth every 8 (eight) hours as needed (nausea or vomiting)., Starting Thu 8/6/2020, Print         STOP taking these medications       amLODIPine (NORVASC) 5 MG tablet Comments:   Reason for Stopping:         amoxicillin-clavulanate 875-125mg (AUGMENTIN) 875-125 mg per tablet Comments:   Reason for Stopping:         lisinopril 10 MG tablet Comments:   Reason  for Stopping:         potassium bicarbonate (K-LYTE) disintegrating tablet Comments:   Reason for Stopping:         docusate sodium (COLACE) 100 MG capsule Comments:   Reason for Stopping:         polyethylene glycol (GLYCOLAX) 17 gram/dose powder Comments:   Reason for Stopping:               Discharge Diet:2 gram sodium diet    Activity: activity as tolerated    Discharge Condition: Good    Disposition: Home or Self Care    Time spent on the discharge of the patient including review of hospital course with the patient. reviewing discharge medications and arranging follow-up care 35 minutes.  Patient was seen and examined on the date of discharge and determined to be suitable for discharge.    Future Appointments   Date Time Provider Department Center   8/25/2020  9:30 AM [facility] OI-MAMMO2 [facility] MAMMO[facility] Imaging Ctr   8/25/2020 11:00 AM Refugio Lemon MD [facility] GYN ONC [place]   9/4/2020  9:45 AM Audrey Mustafa MD [facility] PCW   9/15/2020 10:40 AM Nadia Araiza MD [facility] PHYSMED [place]   10/19/2020  9:45 AM Audrey Mustafa MD [facility] [place] W       Yoni Egan MD  Medical Director [facility] Medicine  Spectra:  [phone]  Pager: [phone]

## 2020-08-17 ENCOUNTER — PATIENT OUTREACH (OUTPATIENT)
Dept: ADMINISTRATIVE | Facility: CLINIC | Age: 55
End: 2020-08-17

## 2020-08-17 LAB
BACTERIA BLD CULT: NORMAL
BACTERIA BLD CULT: NORMAL

## 2020-08-17 NOTE — PLAN OF CARE
Patient discharged to home.    Future Appointments   Date Time Provider Department Center   8/25/2020  9:30 AM [facility] OIC-MAMMO2 [facility] Imaging Ctr   8/25/2020 11:00 AM Refugio Lemon MD [facility] GYN ONC [place]   9/4/2020  9:45 AM Audrey Mustafa MD [facility] IM [place] PCW   9/15/2020 10:40 AM Nadia Araiza MD [facility] PHYSMED [facility]   10/19/2020  9:45 AM Audrey Mustafa MD [facility] IM [place] W        08/17/20 0736   Final Note   Assessment Type Final Discharge Note   Anticipated Discharge Disposition Home   Right Care Referral Info   Post Acute Recommendation No Care

## 2020-08-17 NOTE — PATIENT INSTRUCTIONS
Discharge Instructions for Percutaneous Nephrostomy  You had a procedure called percutaneous nephrostomy. This means that urine was drained from your kidney to prevent pain, infection, and kidney damage. You had the procedure because your kidney or the tube leading from the kidney to the bladder (ureter) was blocked by a kidney stone or tumor, or perhaps due to another problem. The blockage caused a backup of urine in your kidney.  A thin, flexible tube called a catheter will stay in place until the problem that caused the buildup of urine has been treated. This may be as soon as a day or as long as weeks to months. The catheter bag is taped to your leg so that you can walk around.  Activity  · Dont lift anything heavier than 10 pounds until your healthcare provider says its OK.  · Avoid strenuous activities, such as mowing the lawn, vacuuming, playing sports, or engaging in anything that will cause your tubing to be pulled or moved.  · Slowly increase your activity level with short, frequent walks 3 to 4 times a day.  · Dont drive while you are still taking pain medicine. Wait until your healthcare provider says its OK to drive.  Home care  · Eat your normal diet.  · Drink 6 to 8 glasses of water a day, unless directed otherwise.  · Wear loose, comfortable clothes that wont pull or kink the catheter tube.  · Check your dressing often to make sure the tubing is secure.  · Dont let the drainage bag hang freely, or it will pull on the catheter. Keep it taped to your leg or hold it temporarily.  · Empty the drainage bag often to keep the weight of the bag from pulling on the catheter.  ¨ Empty the bag when it is one-half to two-thirds full.  ¨ Always empty the bag before you go to bed.  ¨ Wash your hands before and after emptying the bag.  · Measure and record the amount and color of the urine in the bag.  · Gently clean the skin around the catheter with mild soap and warm water. Pat dry with a clean  towel.  · Change your dressing if it becomes loose or dirty.  · Throw away the dressing in a plastic bag.  · If you were asked to stop any medicines before the surgery, be sure to ask the healthcare provider when you may restart taking them. This is especially important in the case of blood thinners (anticoagulants or antiplatelet medicines).  Follow-up care  Make a follow-up appointment as directed by our staff.     When to call your healthcare provider  Call your healthcare provider right away if you have any of these:  · A catheter that is not draining  · The catheter comes out. Do not try to put it back in.  · Pain, redness, or discharge around catheter  · Fever of 100.4°F (38°C) or higher, or as directed by your healthcare provider  · A noticeable increase or decrease in the amount of urine that drains  · Cloudy or smelly urine  · Urine that changes to a pink or red color  · Increased pain  · Severe pain in your side  · Nausea and vomiting   Date Last Reviewed: 2/1/2017  © 4226-3227 [employer]. [address] [place] [address], [place] [address]. All rights reserved. This information is not intended as a substitute for professional medical care. Always follow your healthcare professional's instructions.

## 2020-08-21 ENCOUNTER — HOSPITAL ENCOUNTER (EMERGENCY)
Facility: HOSPITAL | Age: 55
Discharge: HOME | End: 2020-08-21
Attending: EMERGENCY MEDICINE
Payer: MEDICAID

## 2020-08-21 VITALS
DIASTOLIC BLOOD PRESSURE: 66 MMHG | SYSTOLIC BLOOD PRESSURE: 122 MMHG | HEIGHT: 69 IN | OXYGEN SATURATION: 100 % | HEART RATE: 65 BPM | TEMPERATURE: 98 F | RESPIRATION RATE: 12 BRPM | BODY MASS INDEX: 30.36 KG/M2 | WEIGHT: 205 LBS

## 2020-08-21 DIAGNOSIS — R10.13 EPIGASTRIC PAIN: ICD-10-CM

## 2020-08-21 DIAGNOSIS — R11.0 NAUSEA: Primary | ICD-10-CM

## 2020-08-21 LAB
ALBUMIN SERPL BCP-MCNC: 3.7 G/DL (ref 3.5–5.2)
ALP SERPL-CCNC: 90 U/L (ref 55–135)
ALT SERPL W/O P-5'-P-CCNC: 12 U/L (ref 10–44)
ANION GAP SERPL CALC-SCNC: 9 MMOL/L (ref 8–16)
AST SERPL-CCNC: 14 U/L (ref 10–40)
BACTERIA #/AREA URNS AUTO: ABNORMAL /HPF
BASOPHILS # BLD AUTO: 0.02 K/UL (ref 0–0.2)
BASOPHILS NFR BLD: 0.2 % (ref 0–1.9)
BILIRUB SERPL-MCNC: 0.4 MG/DL (ref 0.1–1)
BILIRUB UR QL STRIP: NEGATIVE
BUN SERPL-MCNC: 15 MG/DL (ref 6–20)
CALCIUM SERPL-MCNC: 9.8 MG/DL (ref 8.7–10.5)
CHLORIDE SERPL-SCNC: 104 MMOL/L (ref 95–110)
CLARITY UR REFRACT.AUTO: ABNORMAL
CO2 SERPL-SCNC: 29 MMOL/L (ref 23–29)
COLOR UR AUTO: ABNORMAL
CREAT SERPL-MCNC: 1.2 MG/DL (ref 0.5–1.4)
DIFFERENTIAL METHOD: ABNORMAL
EOSINOPHIL # BLD AUTO: 0.2 K/UL (ref 0–0.5)
EOSINOPHIL NFR BLD: 2.6 % (ref 0–8)
ERYTHROCYTE [DISTWIDTH] IN BLOOD BY AUTOMATED COUNT: 14.8 % (ref 11.5–14.5)
EST. GFR  (AFRICAN AMERICAN): 58 ML/MIN/1.73 M^2
EST. GFR  (NON AFRICAN AMERICAN): 50.3 ML/MIN/1.73 M^2
GLUCOSE SERPL-MCNC: 117 MG/DL (ref 70–110)
GLUCOSE UR QL STRIP: ABNORMAL
HCT VFR BLD AUTO: 36.1 % (ref 37–48.5)
HGB BLD-MCNC: 10.9 G/DL (ref 12–16)
HGB UR QL STRIP: NEGATIVE
HYALINE CASTS UR QL AUTO: 0 /LPF
IMM GRANULOCYTES # BLD AUTO: 0.02 K/UL (ref 0–0.04)
IMM GRANULOCYTES NFR BLD AUTO: 0.2 % (ref 0–0.5)
KETONES UR QL STRIP: NEGATIVE
LEUKOCYTE ESTERASE UR QL STRIP: ABNORMAL
LIPASE SERPL-CCNC: 14 U/L (ref 4–60)
LYMPHOCYTES # BLD AUTO: 1.3 K/UL (ref 1–4.8)
LYMPHOCYTES NFR BLD: 14.4 % (ref 18–48)
MCH RBC QN AUTO: 26.8 PG (ref 27–31)
MCHC RBC AUTO-ENTMCNC: 30.2 G/DL (ref 32–36)
MCV RBC AUTO: 89 FL (ref 82–98)
MICROSCOPIC COMMENT: ABNORMAL
MONOCYTES # BLD AUTO: 0.8 K/UL (ref 0.3–1)
MONOCYTES NFR BLD: 8.4 % (ref 4–15)
NEUTROPHILS # BLD AUTO: 6.9 K/UL (ref 1.8–7.7)
NEUTROPHILS NFR BLD: 74.2 % (ref 38–73)
NITRITE UR QL STRIP: NEGATIVE
NON-SQ EPI CELLS #/AREA URNS AUTO: 3 /HPF
NRBC BLD-RTO: 0 /100 WBC
PH UR STRIP: 7 [PH] (ref 5–8)
PLATELET # BLD AUTO: 334 K/UL (ref 150–350)
PMV BLD AUTO: 9.9 FL (ref 9.2–12.9)
POTASSIUM SERPL-SCNC: 3.5 MMOL/L (ref 3.5–5.1)
PROT SERPL-MCNC: 8.1 G/DL (ref 6–8.4)
PROT UR QL STRIP: ABNORMAL
RBC # BLD AUTO: 4.07 M/UL (ref 4–5.4)
RBC #/AREA URNS AUTO: 0 /HPF (ref 0–4)
SODIUM SERPL-SCNC: 142 MMOL/L (ref 136–145)
SP GR UR STRIP: 1.03 (ref 1–1.03)
SQUAMOUS #/AREA URNS AUTO: 30 /HPF
URN SPEC COLLECT METH UR: ABNORMAL
WBC # BLD AUTO: 9.3 K/UL (ref 3.9–12.7)
WBC #/AREA URNS AUTO: >100 /HPF (ref 0–5)
WBC CLUMPS UR QL AUTO: ABNORMAL

## 2020-08-21 PROCEDURE — S0028 INJECTION, FAMOTIDINE, 20 MG: HCPCS | Performed by: STUDENT IN AN ORGANIZED HEALTH CARE EDUCATION/TRAINING PROGRAM

## 2020-08-21 PROCEDURE — 93005 ELECTROCARDIOGRAM TRACING: CPT

## 2020-08-21 PROCEDURE — 25000003 PHARM REV CODE 250: Performed by: STUDENT IN AN ORGANIZED HEALTH CARE EDUCATION/TRAINING PROGRAM

## 2020-08-21 PROCEDURE — 81001 URINALYSIS AUTO W/SCOPE: CPT

## 2020-08-21 PROCEDURE — 93010 EKG 12-LEAD: ICD-10-PCS | Mod: ,,, | Performed by: INTERNAL MEDICINE

## 2020-08-21 PROCEDURE — 85025 COMPLETE CBC W/AUTO DIFF WBC: CPT

## 2020-08-21 PROCEDURE — 80053 COMPREHEN METABOLIC PANEL: CPT

## 2020-08-21 PROCEDURE — 99285 PR EMERGENCY DEPT VISIT,LEVEL V: ICD-10-PCS | Mod: ,,, | Performed by: EMERGENCY MEDICINE

## 2020-08-21 PROCEDURE — 99284 EMERGENCY DEPT VISIT MOD MDM: CPT | Mod: 25

## 2020-08-21 PROCEDURE — 96375 TX/PRO/DX INJ NEW DRUG ADDON: CPT

## 2020-08-21 PROCEDURE — 87086 URINE CULTURE/COLONY COUNT: CPT

## 2020-08-21 PROCEDURE — 99285 EMERGENCY DEPT VISIT HI MDM: CPT | Mod: ,,, | Performed by: EMERGENCY MEDICINE

## 2020-08-21 PROCEDURE — 83690 ASSAY OF LIPASE: CPT

## 2020-08-21 PROCEDURE — 63600175 PHARM REV CODE 636 W HCPCS: Performed by: EMERGENCY MEDICINE

## 2020-08-21 PROCEDURE — 93010 ELECTROCARDIOGRAM REPORT: CPT | Mod: ,,, | Performed by: INTERNAL MEDICINE

## 2020-08-21 PROCEDURE — 96374 THER/PROPH/DIAG INJ IV PUSH: CPT

## 2020-08-21 RX ORDER — ONDANSETRON 2 MG/ML
4 INJECTION INTRAMUSCULAR; INTRAVENOUS ONCE
Status: DISCONTINUED | OUTPATIENT
Start: 2020-08-21 | End: 2020-08-21

## 2020-08-21 RX ORDER — ONDANSETRON 2 MG/ML
4 INJECTION INTRAMUSCULAR; INTRAVENOUS ONCE
Status: COMPLETED | OUTPATIENT
Start: 2020-08-21 | End: 2020-08-21

## 2020-08-21 RX ORDER — FAMOTIDINE 10 MG/ML
20 INJECTION INTRAVENOUS
Status: COMPLETED | OUTPATIENT
Start: 2020-08-21 | End: 2020-08-21

## 2020-08-21 RX ORDER — OXYCODONE AND ACETAMINOPHEN 5; 325 MG/1; MG/1
1 TABLET ORAL
Status: COMPLETED | OUTPATIENT
Start: 2020-08-21 | End: 2020-08-21

## 2020-08-21 RX ADMIN — OXYCODONE HYDROCHLORIDE AND ACETAMINOPHEN 1 TABLET: 5; 325 TABLET ORAL at 03:08

## 2020-08-21 RX ADMIN — FAMOTIDINE 20 MG: 10 INJECTION INTRAVENOUS at 03:08

## 2020-08-21 RX ADMIN — ONDANSETRON 4 MG: 2 INJECTION INTRAMUSCULAR; INTRAVENOUS at 03:08

## 2020-08-21 NOTE — ED NOTES
Pt received discharge instructions. Verbalizes understanding. Denies further medical concerns upon discharge
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
## 2020-08-21 NOTE — ED PROVIDER NOTES
Encounter Date: 8/21/2020       History     Chief Complaint   Patient presents with    Abdominal Pain     Patient reports abdominal pain and vomiting since 2100 last night.      HPI   Patient is a 55-year-old female past medical history of depression, GERD, hypertension, with history cervical cancer status post chemo radiation, recent placement of left-sided nephrostomy tube on 08/12 who presents to the ED complaining of epigastric pain.  Patient stated the pain started around 6:00 p.m. and was crampy in sensation.  She states that she normally takes oxycodone for this pain however ran out several days ago.  She states that the pain she got nauseous and had one episode of vomiting which was nonbloody.  She denies any chest pain, shortness of breath, back pain, dysuria, constipation, diarrhea, fevers, chills, gait difficulty.  Review of patient's allergies indicates:   Allergen Reactions    Bee sting [allergen ext-venom-honey bee]      Rash      Grass pollen-bermuda, standard      rash     Past Medical History:   Diagnosis Date    Anxiety     Cervical cancer 2014    Chronic back pain     Depression     Diarrhea due to malabsorption 11/14/2018    Fibromyalgia     GERD (gastroesophageal reflux disease)     Hiatal hernia 2014    History of cervical cancer 10/11/2018    History of cervical cancer 10/11/2018    Hx of psychiatric care     Cymbalta, trazodone    Hypertension     Hypomagnesemia 11/21/2018    Lactose intolerance     Metastatic squamous cell carcinoma to lymph node 10/11/2018    Nephrostomy tube displaced     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 drug 11/14/2018    Osteoarthritis of back     Psychiatric problem     Stroke 1972     Past Surgical History:   Procedure Laterality Date    BILATERAL OOPHORECTOMY  2015    CHOLECYSTECTOMY  11/09/2016    Done at [facility], path showed chronic cholecystitis and gallstones    cold knife conization  2014    COLONOSCOPY  2014    COLONOSCOPY N/A  10/24/2016    at [facility], Dr Thomas    COLONOSCOPY N/A 5/18/2018    Procedure: COLONOSCOPY;  Surgeon: Arden Gutiérrez MD;  Location: [facility] ENDO (4TH FLR);  Service: Endoscopy;  Laterality: N/A;    COLONOSCOPY N/A 7/28/2020    Procedure: COLONOSCOPY;  Surgeon: Hammad Reynolds MD;  Location: [facility] ENDO (4TH FLR);  Service: Colon and Rectal;  Laterality: N/A;  covid test [place] 7/25    CYSTOSCOPY WITH URETEROSCOPY, RETROGRADE PYELOGRAPHY, AND INSERTION OF STENT Bilateral 3/21/2020    Procedure: CYSTOSCOPY, WITH RETROGRADE PYELOGRAM,;  Surgeon: Leslie Balbuena MD;  Location: [facility] OR 1ST FLR;  Service: Urology;  Laterality: Bilateral;    ESOPHAGOGASTRODUODENOSCOPY  2014    HYSTERECTOMY  2014    TVH for cervical cancer    OOPHORECTOMY      RETROPERITONEAL LYMPHADENECTOMY Right 9/17/2018    Procedure: LYMPHADENECTOMY, RETROPERITONEUM;  Surgeon: Sebas Reed MD;  Location: [facility] OR 2ND FLR;  Service: General;  Laterality: Right;  ILIAC     Family History   Problem Relation Age of Onset    Heart disease Sister     Heart disease Maternal Grandmother     Colon cancer Father     Esophageal cancer Father     Cancer Father         Lung-smoker    Cancer Mother         Cervical    Cervical cancer Mother     Breast cancer Maternal Aunt     Suicide Daughter         jumped from parking structure    Drug abuse Daughter     Drug abuse Daughter     Rectal cancer Neg Hx     Stomach cancer Neg Hx     Crohn's disease Neg Hx     Ulcerative colitis Neg Hx     Diabetes Neg Hx     Hypertension Neg Hx      Social History     Tobacco Use    Smoking status: Former Smoker    Smokeless tobacco: Never Used   Substance Use Topics    Alcohol use: No     Alcohol/week: 0.0 standard drinks    Drug use: No     Review of Systems   Constitutional: Negative for fever.   HENT: Negative for sore throat.    Respiratory: Negative for shortness of breath.    Cardiovascular: Negative for chest pain.   Gastrointestinal: Positive  for abdominal pain, nausea and vomiting.   Genitourinary: Negative for dysuria.   Musculoskeletal: Negative for back pain.   Skin: Negative for rash.   Neurological: Negative for weakness.   Hematological: Does not bruise/bleed easily.       Physical Exam     Initial Vitals [08/21/20 0242]   BP Pulse Resp Temp SpO2   132/80 95 18 98.1 °F (36.7 °C) 96 %      MAP       --         Physical Exam    Nursing note and vitals reviewed.  Constitutional: She appears well-developed and well-nourished. She is not diaphoretic. She is active.  Non-toxic appearance. She does not appear ill. No distress.   Patient appears comfortable not in acute distress.  Sitting comfortably in examination bed   HENT:   Head: Normocephalic and atraumatic.   Eyes: Conjunctivae are normal. Pupils are equal, round, and reactive to light. Right conjunctiva is not injected. Left conjunctiva is not injected.   Neck: Trachea normal, normal range of motion and full passive range of motion without pain. Neck supple. No neck rigidity.   Cardiovascular: Normal rate, regular rhythm, S1 normal, S2 normal, intact distal pulses and normal pulses.   Pulmonary/Chest: Breath sounds normal. No respiratory distress.   Abdominal: Soft. Normal appearance and bowel sounds are normal. There is no abdominal tenderness. There is no rebound and no guarding.   Abdomen soft, nontender, no rebound, no guarding.  No palpable masses.  No overlying skin changes.   Musculoskeletal: Normal range of motion. No tenderness or edema.      Right shoulder: She exhibits normal range of motion and no tenderness.      Comments: Left-sided nephrostomy tube in place, skin surrounding is non erythematous.  No CVA tenderness bilaterally.   Neurological: She is alert and oriented to person, place, and time.   Skin: Skin is warm and dry. Capillary refill takes less than 2 seconds.         ED Course   Procedures  Labs Reviewed   CBC W/ AUTO DIFFERENTIAL - Abnormal; Notable for the following  components:       Result Value    Hemoglobin 10.9 (*)     Hematocrit 36.1 (*)     Mean Corpuscular Hemoglobin 26.8 (*)     Mean Corpuscular Hemoglobin Conc 30.2 (*)     RDW 14.8 (*)     Gran% 74.2 (*)     Lymph% 14.4 (*)     All other components within normal limits   URINALYSIS, REFLEX TO URINE CULTURE - Abnormal; Notable for the following components:    Appearance, UA Cloudy (*)     Protein, UA 3+ (*)     Glucose, UA 2+ (*)     Leukocytes, UA Trace (*)     All other components within normal limits    Narrative:     Specimen Source->Urine   URINALYSIS MICROSCOPIC - Abnormal; Notable for the following components:    WBC, UA >100 (*)     WBC Clumps, UA Moderate (*)     Bacteria Many (*)     Non-Squam Epith 3 (*)     All other components within normal limits    Narrative:     Specimen Source->Urine   CULTURE, URINE   COMPREHENSIVE METABOLIC PANEL   LIPASE     EKG Readings: (Independently Interpreted)   Initial Reading: No STEMI. Heart Rate: 89.   Normal sinus rhythm, rate of 89 beats per minute, no ST elevations, depressions or irregular intervals       Imaging Results    None          Medical Decision Making:   Initial Assessment:   Patient is a 55-year-old female past medical history of depression, GERD, hypertension, with history cervical cancer status post chemo radiation, recent placement of left-sided nephrostomy tube on 08/12 who presents to the ED complaining of epigastric pain.  Patient presented to this facility multiple times before for similar complaint.  She states she normally takes oxycodone for this pain but ran out several days ago.  Her vitals on arrival were stable.  Physical exam noted a comfortable-appearing female, not in acute distress.  Normal cardiopulmonary tones on auscultation.  Abdomen is soft, nondistended, no palpable masses, no rebound or guarding.  She notes mild pain in palpation epigastrium.  Remainder of exam was unremarkable.  Due to location of pain CBC, CMP, lipase, UA, EKG was  obtained.  EKG was normal sinus, CBC negative for any gross anemia, leukocytosis, lipase normal.  CMP without any gross electrolyte derangements.  No urinary tract infection.  Patient was given Pepcid, Zofran, 1 Percocet.  After this intervention patient her pain has resolved.  Patient's encouraged to follow-up with PCP.  Patient likely has some component of chronic abdominal pain secondary to being on opiates for quite some time.  Encourage patient to attempt to use over-the-counter medication as she transitions away from opiates.  She endorses full understanding.  Patient instructed to return to ED if symptoms worsen. Patient stable for discharge.    Isai Peña M.D.  [facility]  PGY-4                Attending Attestation:   Physician Attestation Statement for Resident:  As the supervising MD   Physician Attestation Statement: I have personally seen and examined this patient.   I agree with the above history. -:   As the supervising MD I agree with the above PE.    As the supervising MD I agree with the above treatment, course, plan, and disposition.  I have reviewed and agree with the residents interpretation of the following: lab data.                                  Clinical Impression:       ICD-10-CM ICD-9-CM   1. Nausea  R11.0 787.02   2. Epigastric pain  R10.13 789.06                                Isai Peña MD  Resident  08/21/20 0519       Isai Peña MD  Resident  08/21/20 0519       Gissel Mason MD  08/21/20 0525

## 2020-08-21 NOTE — ED NOTES
Patient identifiers for Edita Hardin [facility] checked and correct.  LOC: Patient is awake, alert, and aware of environment with an appropriate affect. Patient is oriented x 3 and speaking appropriately.  APPEARANCE: Patient resting comfortably and in no acute distress. Patient is clean and well groomed, patient's clothing is properly fastened.  SKIN: The skin is warm and dry. Patient has normal skin turgor and moist mucus membrances. Skin is intact; no bruising or breakdown noted.  MUSKULOSKELETAL: Patient is moving all extremities well, no obvious deformities noted. Pulses intact.   RESPIRATORY: Airway is open and patent. Respirations are spontaneous and non-labored with normal effort and rate.  CARDIAC: Patient has a normal rate and rhythm. No peripheral edema noted. Capillary refill < 3 seconds.  ABDOMEN: No distention noted. Bowel sounds active in all 4 quadrants. Tender upon palpation to epigastric region.  NEUROLOGICAL: PERRL. Facial expression is symmetrical. Hand grasps are equal bilaterally. Normal sensation in all extremities when touched with finger.  Allergies reported:   Review of patient's allergies indicates:   Allergen Reactions    Bee sting [allergen ext-venom-honey bee]      Rash      Grass pollen-bermuda, standard      rash

## 2020-08-22 LAB — BACTERIA UR CULT: NORMAL

## 2020-08-25 ENCOUNTER — HOSPITAL ENCOUNTER (OUTPATIENT)
Dept: RADIOLOGY | Facility: HOSPITAL | Age: 55
Discharge: HOME | End: 2020-08-25
Attending: OBSTETRICS & GYNECOLOGY
Payer: MEDICAID

## 2020-08-25 ENCOUNTER — OFFICE VISIT (OUTPATIENT)
Dept: GYNECOLOGIC ONCOLOGY | Facility: CLINIC | Age: 55
End: 2020-08-25
Payer: MEDICAID

## 2020-08-25 VITALS
WEIGHT: 196 LBS | SYSTOLIC BLOOD PRESSURE: 123 MMHG | DIASTOLIC BLOOD PRESSURE: 60 MMHG | BODY MASS INDEX: 28.94 KG/M2 | HEART RATE: 74 BPM

## 2020-08-25 DIAGNOSIS — Z12.31 SCREENING MAMMOGRAM, ENCOUNTER FOR: ICD-10-CM

## 2020-08-25 DIAGNOSIS — R10.84 GENERALIZED ABDOMINAL PAIN: ICD-10-CM

## 2020-08-25 DIAGNOSIS — R92.8 ABNORMAL MAMMOGRAM: ICD-10-CM

## 2020-08-25 DIAGNOSIS — C77.9 METASTATIC SQUAMOUS CELL CARCINOMA TO LYMPH NODE: Primary | ICD-10-CM

## 2020-08-25 DIAGNOSIS — Z01.419 WELL WOMAN EXAM WITH ROUTINE GYNECOLOGICAL EXAM: ICD-10-CM

## 2020-08-25 PROCEDURE — 99999 PR PBB SHADOW E&M-EST. PATIENT-LVL III: CPT | Mod: PBBFAC,,, | Performed by: OBSTETRICS & GYNECOLOGY

## 2020-08-25 PROCEDURE — 77063 BREAST TOMOSYNTHESIS BI: CPT | Mod: 26,,, | Performed by: RADIOLOGY

## 2020-08-25 PROCEDURE — 77067 SCR MAMMO BI INCL CAD: CPT | Mod: TC

## 2020-08-25 PROCEDURE — 77067 SCR MAMMO BI INCL CAD: CPT | Mod: 26,,, | Performed by: RADIOLOGY

## 2020-08-25 PROCEDURE — 99396 PREV VISIT EST AGE 40-64: CPT | Mod: S$PBB,,, | Performed by: OBSTETRICS & GYNECOLOGY

## 2020-08-25 PROCEDURE — 77067 MAMMO DIGITAL SCREENING BILAT WITH TOMOSYNTHESIS_CAD: ICD-10-PCS | Mod: 26,,, | Performed by: RADIOLOGY

## 2020-08-25 PROCEDURE — 99213 OFFICE O/P EST LOW 20 MIN: CPT | Mod: PBBFAC | Performed by: OBSTETRICS & GYNECOLOGY

## 2020-08-25 PROCEDURE — 77063 MAMMO DIGITAL SCREENING BILAT WITH TOMOSYNTHESIS_CAD: ICD-10-PCS | Mod: 26,,, | Performed by: RADIOLOGY

## 2020-08-25 PROCEDURE — 88142 CYTOPATH C/V THIN LAYER: CPT

## 2020-08-25 PROCEDURE — 99396 PR PREVENTIVE VISIT,EST,40-64: ICD-10-PCS | Mod: S$PBB,,, | Performed by: OBSTETRICS & GYNECOLOGY

## 2020-08-25 PROCEDURE — 99999 PR PBB SHADOW E&M-EST. PATIENT-LVL III: ICD-10-PCS | Mod: PBBFAC,,, | Performed by: OBSTETRICS & GYNECOLOGY

## 2020-08-25 NOTE — PROGRESS NOTES
Subjective:       Patient ID: Edita Riley is a 55 y.o. female.    Chief Complaint: Cervical Cancer (3mth f/u)    HPI     Patient comes in for her WWE and follow up for recurrent SCCA of the cervix in a pelvic node. S/P chemoRT.   Denies vaginal bleeding.   No GYN complaints today. Has had five ED visits since late 2020 all due to stomach pain. She reports decreased appetite, weight loss (20lb decrease noted since last visit), and N/V. Recent colonoscopy was normal but last EGD was in 2016.    Bilateral nephrostomy tubes replaced 2020 after CT scan noted increased L sided hydronephrosis.         Pap:  Aug 23, 2019: Normal  MM: normal. Tyrer-Cuzick: 20.91 %  CBE: Aug 25, 2020  Colonoscopy: 2020 (repeat in 5 yrs)     Her oncologic history is:  Referring physician:  Dr. Sebas Reed  Reason for referral:  Incisional biopsy of retroperitoneal periaortic node that shows squamous cell carcinoma consistent with gynecologic primary        Aug 2018: chronic abdominal pain referred for consideration of biopsy of a 2 cm right common iliac artery bifurcation node  Node was evident on scan in  and again in  - no change  Patient's symptoms are non specific, diffuse, have not resulted in weight loss  Patient already carries a diagnosis of diffuse pain syndromes including fibromyalgia        Aug 2018: PET scan Right common iliac lymph node suspicious for malignancy.  This could be benign or malignant lymphoproliferative disorder metastatic disease.  This is an a risky position for percutaneous biopsy.      2018: Underwent retroperitoneal approach for incisional biopsy of right common iliac LN. Pathology showed:  Supplemental Diagnosis  METASTATIC HIGH-GRADE CARCINOMA GROWING WITH SQUAMOUS CELL FEATURES. THE RESULTS OF  ADDITIONAL IMMUNOSTAINS ARE AS FOLLOWS: CK5/6, P63 AND CK7 ARE POSITIVE. TTF, S100 AND  CK20 ARE ALL NEGATIVE. THE CONTROLS STAIN APPROPRIATELY.  NOTE:  "GIVEN THESE RESULTS THIS MAY REPRESENT A PRIMARY IN THE URINARY OR LOWER GYN  TRACTS. DR. JAMESON HAS ALSO REVIEWED THIS CASE AND CONCURS WITH THE DIAGNOSIS.       She has a history of hysterectomy for cervical cancer in 2015 in [place], [place]. Hyst was for abnormal pap. Incidental finding of cervical cancer on the hyst specimen. She does not know any other details.      Nov 2018: started concurrent chemoradiation.      Dec 2018: admitted with hypokalemia, hypomagnesemia and hypocalcemia. Seizure like activity. Worsening depression. Electrolyte replacement. Started on Abilify by psychiatry. S/p 4 cycles of cisplatin.      Jan 2019: completed "WPRT and 4 cycles of cisplatin. Received only 4 due to electrolyte abnormalities.      Feb 2019:PET: Right common iliac lymph node, shows max SUV of 5.6 on today's exam versus 10.2 on prior exam.  A subcentimeter lymph node is seen in CT in this region.    The previously seen avid left axillary lymph node is no longer visualized is FDG avid.        May 2019: CT CAP: no evidence for disease. (CT done for insurance reason).  Previous imaging was a PET scan     Nov 2019: CT AP: STEVEN    March 2020: Recent ED visit for flank pain. Found to have right hydronephrosis. 3/21/2020: Cystoscopy with Dr. Balbuena. Bilateral ureteral stents placed. Throughout the bladder base there were areas of heaped up tissue concerning for bladder tumor versus radiation cystitis.  The areas lateral to each UO were resected and tissue was sent for pathology. Pathology showed:Urothelial mucosa with marked acute and chronic inflammation including numerous eosinophils, vascular  constriction and necrosis of stromal tissue, and urothelial hyperplasia with cytologic atypia suggestive of  radiation cystitis.  No definitive malignancy is identified.     April 2020:  CT AP: Bilateral double-J ureteral catheters with mild bilateral hydronephrosis.  Nonspecific soft tissue thickening at the base of the bladder right " greater left.  Had bilateral nephrostomy tubes placed at [facility]. ODESSA.      May 2020:  ED visit for bilateral lower extremity swelling up to the level of the calf.  Bilateral nephrostomy tubes.  UTI.  Urine cultures showed IJEOMA LUSITANIAE     July 2020: CT C/A/P with increased L sided hydronephrosis       Review of Systems   Constitutional: Positive for appetite change (decreased) and unexpected weight change. Negative for chills, diaphoresis, fatigue and fever.   HENT: Negative for mouth sores and tinnitus.    Respiratory: Negative for cough, chest tightness, shortness of breath and wheezing.    Cardiovascular: Negative for chest pain, palpitations and leg swelling.   Gastrointestinal: Positive for abdominal pain, nausea and vomiting. Negative for abdominal distention, blood in stool, constipation and diarrhea.   Genitourinary: Negative for difficulty urinating, dysuria, flank pain, frequency, hematuria, pelvic pain, vaginal bleeding, vaginal discharge and vaginal pain.   Musculoskeletal: Negative for arthralgias and back pain.   Skin: Negative for color change and rash.   Neurological: Negative for dizziness, weakness, numbness and headaches.   Hematological: Negative for adenopathy.   Psychiatric/Behavioral: Negative for confusion and sleep disturbance. The patient is not nervous/anxious.        Objective:   /60   Pulse 74   Wt 88.9 kg (196 lb)   LMP 06/08/2014 (Approximate)   BMI 28.94 kg/m²      Physical Exam  Vitals signs and nursing note reviewed. Exam conducted with a chaperone present.   Constitutional:       General: She is not in acute distress.     Appearance: Normal appearance. She is well-developed. She is not diaphoretic.   HENT:      Head: Normocephalic and atraumatic.   Eyes:      General: No scleral icterus.  Neck:      Musculoskeletal: Normal range of motion and neck supple.      Thyroid: No thyromegaly.   Cardiovascular:      Rate and Rhythm: Normal rate and regular  rhythm.      Heart sounds: No murmur.   Pulmonary:      Effort: Pulmonary effort is normal. No respiratory distress.      Breath sounds: Normal breath sounds. No wheezing.   Chest:      Breasts: Breasts are symmetrical.         Right: No inverted nipple, mass, nipple discharge, skin change or tenderness.         Left: No inverted nipple, mass, nipple discharge, skin change or tenderness.   Abdominal:      General: There is no distension.      Palpations: Abdomen is soft. There is no mass.      Tenderness: There is no abdominal tenderness. There is no guarding or rebound.   Genitourinary:     Exam position: Lithotomy position.      Comments: Bimanual exam:  Vulva: no lesions. Normal appearance  Urethra: Normal size and location. No lesions  Bladder: No masses or tenderness.  Vagina: normal mucosa. No lesion  Cervix: absent.   Uterus: absent.  Adnexa: no masses.  Rectovaginal: No posterior cul de sac thickening or nodularity.  Rectal: no masses. Nontender. Normal tone.     Musculoskeletal: Normal range of motion.         General: No tenderness.      Right lower leg: No edema.      Left lower leg: No edema.   Lymphadenopathy:      Cervical: No cervical adenopathy.   Skin:     General: Skin is warm and dry.      Coloration: Skin is not pale.      Findings: No erythema or rash.   Neurological:      Mental Status: She is alert and oriented to person, place, and time. Mental status is at baseline.   Psychiatric:         Mood and Affect: Mood normal.         Behavior: Behavior normal.         Assessment:       1. 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 Screening mammogram, encounter for    3. Well woman exam with routine gynecological exam    4. Generalized abdominal pain    5. Abnormal mammogram        Plan:   Metastatic squamous cell carcinoma to lymph node  -     Liquid-Based Pap Smear, Screening    Screening mammogram, encounter for  -     Mammo Digital Screening Bilat w/ Ken; Future; Expected date:  08/26/2021    Well woman exam with routine gynecological exam    Generalized abdominal pain  -     Ambulatory referral/consult to Gastroenterology; Future; Expected date: 09/01/2020    Abnormal mammogram    STEVEN on today's exam  F/u pap smear  GI referral for evaluation of periumbilical pain and N/V, weight loss  Message to Dr. Balbuena for neph tube follow up  RTC in 3 months for surveillance  Referral to breast clinic for elevated T-C score.

## 2020-08-26 ENCOUNTER — TELEPHONE (OUTPATIENT)
Dept: SURGERY | Facility: CLINIC | Age: 55
End: 2020-08-26

## 2020-08-26 NOTE — TELEPHONE ENCOUNTER
Returned call to [place] regarding referral from Dr. Refugio Lemon office for High Risk . Patient has been   scheduled to see Nicole CAMPBELL on 9/24/20 @ 1:00 pm . Patient has been informed of this apt date & time mailed out apt slip.

## 2020-08-27 ENCOUNTER — TELEPHONE (OUTPATIENT)
Dept: UROLOGY | Facility: CLINIC | Age: 55
End: 2020-08-27

## 2020-08-28 ENCOUNTER — OFFICE VISIT (OUTPATIENT)
Dept: UROLOGY | Facility: CLINIC | Age: 55
End: 2020-08-28
Payer: MEDICAID

## 2020-08-28 VITALS
WEIGHT: 196 LBS | HEART RATE: 79 BPM | DIASTOLIC BLOOD PRESSURE: 68 MMHG | SYSTOLIC BLOOD PRESSURE: 117 MMHG | HEIGHT: 69 IN | BODY MASS INDEX: 29.03 KG/M2

## 2020-08-28 DIAGNOSIS — N13.1 HYDRONEPHROSIS WITH URETERAL STRICTURE, NOT ELSEWHERE CLASSIFIED: Primary | ICD-10-CM

## 2020-08-28 DIAGNOSIS — Z85.41 HISTORY OF CERVICAL CANCER: ICD-10-CM

## 2020-08-28 DIAGNOSIS — N13.5 OBSTRUCTION OF BOTH URETERS: ICD-10-CM

## 2020-08-28 PROBLEM — N12 PYELONEPHRITIS: Status: RESOLVED | Noted: 2020-06-11 | Resolved: 2020-08-28

## 2020-08-28 PROCEDURE — 99213 OFFICE O/P EST LOW 20 MIN: CPT | Mod: PBBFAC | Performed by: UROLOGY

## 2020-08-28 PROCEDURE — 99999 PR PBB SHADOW E&M-EST. PATIENT-LVL III: ICD-10-PCS | Mod: PBBFAC,,, | Performed by: UROLOGY

## 2020-08-28 PROCEDURE — 99213 OFFICE O/P EST LOW 20 MIN: CPT | Mod: S$PBB,,, | Performed by: UROLOGY

## 2020-08-28 PROCEDURE — 99213 PR OFFICE/OUTPT VISIT, EST, LEVL III, 20-29 MIN: ICD-10-PCS | Mod: S$PBB,,, | Performed by: UROLOGY

## 2020-08-28 PROCEDURE — 99999 PR PBB SHADOW E&M-EST. PATIENT-LVL III: CPT | Mod: PBBFAC,,, | Performed by: UROLOGY

## 2020-08-28 NOTE — PROGRESS NOTES
CHIEF COMPLAINT:    Mrs. Riley is a 55 y.o. female presenting for a pre op .    PRESENTING ILLNESS:    Edita Riley is a 55 y.o. female who returns to discuss transverse colon conduit.  She states she had her percutaneous nephrostomy tubes changed on 8/13 because one had stopped draining.  Both were changed that day.  She cannot sleep in her bed due to the discomfort from the tubes.  She had failed stents and has subsequently undergone Mag3 renal scan which showed bilateral obstruction.  The kidneys did not even move the contrast.      Because of a history of radiation therapy for cervical cancer, I have offered her a transverse colon conduit.  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  She is STEVEN at this point.       REVIEW OF SYSTEMS:    Review of Systems   Constitutional: Negative.    HENT: Negative.    Eyes: Negative.    Respiratory: Negative.    Cardiovascular: Negative.    Gastrointestinal: Negative.    Genitourinary:        Ureteral obstruction, cervical cancer.    Musculoskeletal: Negative.    Skin: Negative.    Neurological: Negative.    Endo/Heme/Allergies: Negative.    Psychiatric/Behavioral: Negative.        PATIENT HISTORY:    Past Medical History:   Diagnosis Date    Abnormal mammogram 8/25/2020    Anxiety     Cervical cancer 2014    Chronic back pain     Depression     Diarrhea due to malabsorption 11/14/2018    Fibromyalgia     Generalized abdominal pain 8/25/2020    GERD (gastroesophageal reflux disease)     Hiatal hernia 2014    History of cervical cancer 10/11/2018    History of cervical cancer 10/11/2018    Hx of psychiatric care     Cymbalta, trazodone    Hypertension     Hypomagnesemia 11/21/2018    Lactose intolerance     Metastatic squamous cell carcinoma to lymph node 10/11/2018    Nephrostomy tube displaced     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 drug 11/14/2018    Osteoarthritis of  back     Psychiatric problem     Stroke 1972       Past Surgical History:   Procedure Laterality Date    BILATERAL OOPHORECTOMY  2015    CHOLECYSTECTOMY  11/09/2016    Done at [facility], path showed chronic cholecystitis and gallstones    cold knife conization  2014    COLONOSCOPY  2014    [facility]    COLONOSCOPY N/A 5/18/2018    Procedure: COLONOSCOPY;  Surgeon: Arden Gutiérrez MD;  Location: [facility] ENDO (4TH FLR);  Service: Endoscopy;  Laterality: N/A;    COLONOSCOPY N/A 7/28/2020    Procedure: COLONOSCOPY;  Surgeon: Hammad Reynolds MD;  Location: [facility] ENDO (4TH FLR);  Service: Colon and Rectal;  Laterality: N/A;  covid test [facility] 7/25    CYSTOSCOPY WITH URETEROSCOPY, RETROGRADE PYELOGRAPHY, AND INSERTION OF STENT Bilateral 3/21/2020    Procedure: CYSTOSCOPY, WITH RETROGRADE PYELOGRAM,;  Surgeon: Leslie Balbuena MD;  Location: [facility] OR 1ST FLR;  Service: Urology;  Laterality: Bilateral;    ESOPHAGOGASTRODUODENOSCOPY  2014    HY[facility]CTOMY  2014    [facility]    OOPHORECTOMY      RETROPERITONEAL LYMPHADENECTOMY Right 9/17/2018    Procedure: LYMPHADENECTOMY, RETROPERITONEUM;  Surgeon: Sebas Reed MD;  Location: [facility] OR 2ND FLR;  Service: General;  Laterality: Right;  ILIAC       Family History   Problem Relation Age of Onset    Heart disease Sister     Heart disease Maternal Grandmother     Colon cancer Father     Esophageal cancer Father     Cancer Father         Lung-smoker    Cancer Mother         Cervical    Cervical cancer Mother     Breast cancer Maternal Aunt     Suicide Daughter         jumped from parking structure    Drug abuse Daughter     Drug abuse Daughter      Socioeconomic History    Marital status:      Spouse name: Hammad    Number of children: 2   Tobacco Use    Smoking status: Former Smoker    Smokeless tobacco: Never Used   Substance and Sexual Activity    Alcohol use: No     Alcohol/week: 0.0  standard drinks    Drug use: No    Sexual activity: Yes     Partners: Male     Birth control/protection: None     Comment:  19 years since    Social History Narrative    , twin daughters (1  2018), disabled due to childhood stroke, Zoroastrian sophia       Allergies:  Bee sting [allergen ext-venom-honey bee] and Grass pollen-bermuda, standard    Medications:  Outpatient Encounter Medications as of 2020   Medication Sig Dispense Refill    gabapentin (NEURONTIN) 300 MG capsule Take 1 capsule (300 mg total) by mouth 3 (three) times daily. 270 capsule 3    ketoconazole (NIZORAL) 2 % cream Apply topically once daily to affected area 30 g 1    omeprazole (PRILOSEC) 40 MG capsule Take 1 capsule (40 mg total) by mouth once daily. 90 capsule 3    ondansetron (ZOFRAN-ODT) 4 MG TbDL Take 1 tablet (4 mg total) by mouth every 8 (eight) hours as needed (nausea or vomiting). 20 tablet 2    oxyCODONE (ROXICODONE) 5 MG immediate release tablet Take 1 tablet (5 mg total) by mouth every 8 (eight) hours as needed for Pain. 10 tablet 0    traZODone (DESYREL) 50 MG tablet TAKE 1 TABLET (50 MG TOTAL) BY MOUTH EVERY EVENING. 30 tablet 11     No facility-administered encounter medications on file as of 2020.          PHYSICAL EXAMINATION:    The patient generally appears in good health, is appropriately interactive, and is in no apparent distress.    Skin: No lesions.    Mental: Cooperative with normal affect.    Neuro: Grossly intact.    HEENT: Normal. No evidence of lymphadenopathy.    Chest:  normal inspiratory effort.    Abdomen:  Soft, non-tender. No masses or organomegaly. Bladder is not palpable. No evidence of flank discomfort. No evidence of inguinal hernia.    Extremities: No clubbing, cyanosis, or edema      LABS:    Lab Results   Component Value Date    BUN 15 2020    CREATININE 1.2 2020     IMPRESSION:    Encounter Diagnoses   Name Primary?    Hydronephrosis  with ureteral stricture, not elsewhere classified Yes    History of cervical cancer     Obstruction of both ureters        PLAN:    1.  For transverse colon conduit.  Coordinated a date with Dr. Reynolds for harvest of the colon and anastamosis.   2.  Consent signed and a limited blood consent (for albumin and clotting factors if needed)  3.  No cystectomy, given that she has had radiation therapy and is a Caodaism, concern for increased risk of bleeding secondary to the radiation therapy  4.  She will be marked the week of surgery with Paulina Gilbert.

## 2020-08-28 NOTE — H&P (VIEW-ONLY)
CHIEF COMPLAINT:    Mrs. Riley is a 55 y.o. female presenting for a pre op .    PRESENTING ILLNESS:    Edita Riley is a 55 y.o. female who returns to discuss transverse colon conduit.  She states she had her percutaneous nephrostomy tubes changed on 8/13 because one had stopped draining.  Both were changed that day.  She cannot sleep in her bed due to the discomfort from the tubes.  She had failed stents and has subsequently undergone Mag3 renal scan which showed bilateral obstruction.  The kidneys did not even move the contrast.      Because of a history of radiation therapy for cervical cancer, I have offered her a transverse colon conduit.  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  She is STEVEN at this point.       REVIEW OF SYSTEMS:    Review of Systems   Constitutional: Negative.    HENT: Negative.    Eyes: Negative.    Respiratory: Negative.    Cardiovascular: Negative.    Gastrointestinal: Negative.    Genitourinary:        Ureteral obstruction, cervical cancer.    Musculoskeletal: Negative.    Skin: Negative.    Neurological: Negative.    Endo/Heme/Allergies: Negative.    Psychiatric/Behavioral: Negative.        PATIENT HISTORY:    Past Medical History:   Diagnosis Date    Abnormal mammogram 8/25/2020    Anxiety     Cervical cancer 2014    Chronic back pain     Depression     Diarrhea due to malabsorption 11/14/2018    Fibromyalgia     Generalized abdominal pain 8/25/2020    GERD (gastroesophageal reflux disease)     Hiatal hernia 2014    History of cervical cancer 10/11/2018    History of cervical cancer 10/11/2018    Hx of psychiatric care     Cymbalta, trazodone    Hypertension     Hypomagnesemia 11/21/2018    Lactose intolerance     Metastatic squamous cell carcinoma to lymph node 10/11/2018    Nephrostomy tube displaced     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 drug 11/14/2018    Osteoarthritis of  back     Psychiatric problem     Stroke 1972       Past Surgical History:   Procedure Laterality Date    BILATERAL OOPHORECTOMY  2015    CHOLECYSTECTOMY  11/09/2016    Done at [facility], path showed chronic cholecystitis and gallstones    cold knife conization  2014    COLONOSCOPY  2014    [facility]    COLONOSCOPY N/A 5/18/2018    Procedure: COLONOSCOPY;  Surgeon: Arden Gutiérrez MD;  Location: [facility] ENDO (4TH FLR);  Service: Endoscopy;  Laterality: N/A;    COLONOSCOPY N/A 7/28/2020    Procedure: COLONOSCOPY;  Surgeon: Hammad Reynolds MD;  Location: [facility] ENDO (4TH FLR);  Service: Colon and Rectal;  Laterality: N/A;  covid test [place] 7/25    CYSTOSCOPY WITH URETEROSCOPY, RETROGRADE PYELOGRAPHY, AND INSERTION OF STENT Bilateral 3/21/2020    Procedure: CYSTOSCOPY, WITH RETROGRADE PYELOGRAM,;  Surgeon: Leslie Balbuena MD;  Location: [facility] OR 1ST FLR;  Service: Urology;  Laterality: Bilateral;    ESOPHAGOGASTRODUODENOSCOPY  2014    [facility]Y  [facility]    [facility]    OOPHORECTOMY      RETROPERITONEAL LYMPHADENECTOMY Right 9/17/2018    Procedure: LYMPHADENECTOMY, RETROPERITONEUM;  Surgeon: Sebas Reed MD;  Location: [facility] OR 2ND FLR;  Service: General;  Laterality: Right;  ILIAC       Family History   Problem Relation Age of Onset    Heart disease Sister     Heart disease Maternal Grandmother     Colon cancer Father     Esophageal cancer Father     Cancer Father         Lung-smoker    Cancer Mother         Cervical    Cervical cancer Mother     Breast cancer Maternal Aunt     Suicide Daughter         jumped from parking structure    Drug abuse Daughter     Drug abuse Daughter      Socioeconomic History    Marital status:      Spouse name: Hammad    Number of children: 2   Tobacco Use    Smoking status: Former Smoker    Smokeless tobacco: Never Used   Substance and Sexual Activity    Alcohol use: No     Alcohol/week: 0.0  standard drinks    Drug use: No    Sexual activity: Yes     Partners: Male     Birth control/protection: None     Comment:  19 years since    Social History Narrative    , twin daughters (1  2018), disabled due to childhood stroke, Jain sophia       Allergies:  Bee sting [allergen ext-venom-honey bee] and Grass pollen-bermuda, standard    Medications:  Outpatient Encounter Medications as of 2020   Medication Sig Dispense Refill    gabapentin (NEURONTIN) 300 MG capsule Take 1 capsule (300 mg total) by mouth 3 (three) times daily. 270 capsule 3    ketoconazole (NIZORAL) 2 % cream Apply topically once daily to affected area 30 g 1    omeprazole (PRILOSEC) 40 MG capsule Take 1 capsule (40 mg total) by mouth once daily. 90 capsule 3    ondansetron (ZOFRAN-ODT) 4 MG TbDL Take 1 tablet (4 mg total) by mouth every 8 (eight) hours as needed (nausea or vomiting). 20 tablet 2    oxyCODONE (ROXICODONE) 5 MG immediate release tablet Take 1 tablet (5 mg total) by mouth every 8 (eight) hours as needed for Pain. 10 tablet 0    traZODone (DESYREL) 50 MG tablet TAKE 1 TABLET (50 MG TOTAL) BY MOUTH EVERY EVENING. 30 tablet 11     No facility-administered encounter medications on file as of 2020.          PHYSICAL EXAMINATION:    The patient generally appears in good health, is appropriately interactive, and is in no apparent distress.    Skin: No lesions.    Mental: Cooperative with normal affect.    Neuro: Grossly intact.    HEENT: Normal. No evidence of lymphadenopathy.    Chest:  normal inspiratory effort.    Abdomen:  Soft, non-tender. No masses or organomegaly. Bladder is not palpable. No evidence of flank discomfort. No evidence of inguinal hernia.    Extremities: No clubbing, cyanosis, or edema      LABS:    Lab Results   Component Value Date    BUN 15 2020    CREATININE 1.2 2020     IMPRESSION:    Encounter Diagnoses   Name Primary?    Hydronephrosis  with ureteral stricture, not elsewhere classified Yes    History of cervical cancer     Obstruction of both ureters        PLAN:    1.  For transverse colon conduit.  Coordinated a date with Dr. Reynolds for harvest of the colon and anastamosis.   2.  Consent signed and a limited blood consent (for albumin and clotting factors if needed)  3.  No cystectomy, given that she has had radiation therapy and is a Judaism, concern for increased risk of bleeding secondary to the radiation therapy  4.  She will be marked the week of surgery with Paulina Gilbert.

## 2020-09-01 ENCOUNTER — OFFICE VISIT (OUTPATIENT)
Dept: GASTROENTEROLOGY | Facility: CLINIC | Age: 55
End: 2020-09-01
Payer: MEDICAID

## 2020-09-01 ENCOUNTER — TELEPHONE (OUTPATIENT)
Dept: UROLOGY | Facility: CLINIC | Age: 55
End: 2020-09-01

## 2020-09-01 VITALS
TEMPERATURE: 98 F | DIASTOLIC BLOOD PRESSURE: 64 MMHG | WEIGHT: 196.63 LBS | HEART RATE: 69 BPM | SYSTOLIC BLOOD PRESSURE: 114 MMHG | OXYGEN SATURATION: 99 % | HEIGHT: 69 IN | BODY MASS INDEX: 29.12 KG/M2

## 2020-09-01 DIAGNOSIS — R10.84 GENERALIZED ABDOMINAL PAIN: Primary | ICD-10-CM

## 2020-09-01 DIAGNOSIS — Z01.818 PREOP EXAMINATION: ICD-10-CM

## 2020-09-01 DIAGNOSIS — N13.5 BILATERAL URETERAL OBSTRUCTION: Primary | ICD-10-CM

## 2020-09-01 DIAGNOSIS — K21.9 GASTROESOPHAGEAL REFLUX DISEASE, ESOPHAGITIS PRESENCE NOT SPECIFIED: ICD-10-CM

## 2020-09-01 DIAGNOSIS — N13.30 BILATERAL HYDRONEPHROSIS: ICD-10-CM

## 2020-09-01 DIAGNOSIS — N13.1 HYDRONEPHROSIS WITH URETERAL STRICTURE, NOT ELSEWHERE CLASSIFIED: ICD-10-CM

## 2020-09-01 PROCEDURE — 99215 OFFICE O/P EST HI 40 MIN: CPT | Mod: PBBFAC,PO | Performed by: NURSE PRACTITIONER

## 2020-09-01 PROCEDURE — 99999 PR PBB SHADOW E&M-EST. PATIENT-LVL V: ICD-10-PCS | Mod: PBBFAC,,, | Performed by: NURSE PRACTITIONER

## 2020-09-01 PROCEDURE — 99214 PR OFFICE/OUTPT VISIT, EST, LEVL IV, 30-39 MIN: ICD-10-PCS | Mod: S$PBB,,, | Performed by: NURSE PRACTITIONER

## 2020-09-01 PROCEDURE — 99999 PR PBB SHADOW E&M-EST. PATIENT-LVL V: CPT | Mod: PBBFAC,,, | Performed by: NURSE PRACTITIONER

## 2020-09-01 PROCEDURE — 99214 OFFICE O/P EST MOD 30 MIN: CPT | Mod: S$PBB,,, | Performed by: NURSE PRACTITIONER

## 2020-09-01 RX ORDER — POLYETHYLENE GLYCOL 3350 17 G/17G
17 POWDER, FOR SOLUTION ORAL DAILY
Qty: 510 G | Refills: 11 | Status: ON HOLD | OUTPATIENT
Start: 2020-09-01 | End: 2020-12-03 | Stop reason: HOSPADM

## 2020-09-01 RX ORDER — DICYCLOMINE HYDROCHLORIDE 20 MG/1
20 TABLET ORAL 3 TIMES DAILY PRN
Qty: 60 TABLET | Refills: 2 | Status: ON HOLD | OUTPATIENT
Start: 2020-09-01 | End: 2020-10-26

## 2020-09-01 NOTE — LETTER
September 4, 2020      Refugio Lemon MD  [address]  [facility] [address]           [facility]  [address]   [facility] [address]  Phone: [phone]  Fax: [phone]          Patient: Edita Riley   MR Number: [id number]   YOB: 1965   Date of Visit: 9/1/2020       Dear Dr. Refugio Lemon:    Thank you for referring Edita Riley to me for evaluation. Attached you will find relevant portions of my assessment and plan of care.    If you have questions, please do not hesitate to call me. I look forward to following Edita Riley along with you.    Sincerely,    Shelbi Dunbar, TONI    Enclosure  CC:  No Recipients    If you would like to receive this communication electronically, please contact [email or web address] or [phone] to request more information on [employer] access.    For providers and/or their staff who would like to refer a patient to [facility], please contact us through our one-stop-shop provider referral line, [facility], at [phone].    If you feel you have received this communication in error or would no longer like to receive these types of communications, please e-mail [email or web address]

## 2020-09-01 NOTE — PATIENT INSTRUCTIONS
- Miralax daily with OTC fiber supplement (fiber gummy or tablet)  - Dicyclomine tablet: one tablet three times per daily as needed for abdominal pain

## 2020-09-02 ENCOUNTER — TELEPHONE (OUTPATIENT)
Dept: PREADMISSION TESTING | Facility: HOSPITAL | Age: 55
End: 2020-09-02

## 2020-09-02 ENCOUNTER — TELEPHONE (OUTPATIENT)
Dept: INTERNAL MEDICINE | Facility: CLINIC | Age: 55
End: 2020-09-02

## 2020-09-02 NOTE — ANESTHESIA PAT ROS NOTE
09/02/2020  Edita Riley is a 55 y.o., female. JEHOVAHS WITNESS-blood consent scanned to media by surgeon on 9/9/20.      Pre-op Assessment          Review of Systems  Anesthesia Hx:  No problems with previous Anesthesia  History of prior surgery of interest to airway management or planning: Previous anesthesia: MAC  7/2020 colonoscopy with MAC.  Procedure performed at an [facility]. Denies Family Hx of Anesthesia complications.   Denies Personal Hx of Anesthesia complications.   Social:  No Alcohol Use, Non-Smoker    Hematology/Oncology:  Hematology Normal      Hematology Comments: Pt is Jehovahs Witness-blood consent scanned to media by surgeon on 9/9/20. Denies Current/Recent Cancer  --  Cancer in past history (2019 SCCA lymph nodes, 2015 cervical cancer treated with chemo and radiation):  Oncology Comments: history of cervical cancer s/p hysterectomy in 2015, with mets to R common iliac lymph node treated with chemo/XRT in 2019 complicated with radiation cystitis and hydronephrosis      EENT/Dental:EENT/Dental Normal   Cardiovascular:    Denies Angina. 4/2019 echo EF 65% Functional Capacity good / => 4 METS  Valvular Heart Disease: Mitral Regurgitation (MR), mild, Tricuspid Regurgitation (TR), mild    Denies Hypertension.    Pulmonary:   Denies Shortness of breath.  Denies Recent URI.  Possible Obstructive Sleep Apnea , (STOP/BANG) Symptoms A - Age > 50        Renal/:  Devices/Procedures/Surgery, nephrostomy tube bilateral. Other Renal / Gu Conditions: Hydronephrosis  Hepatic/GI:  Hepatic/GI Symptoms: nausea, abdominal pain.  Esophageal / Stomach Disorders Gerd Controlled by chronic antireflux medication.  Hernia, Hiatal Hernia   Musculoskeletal:  Musculoskeletal General/Symptoms: joint pain. Functional capacity is ambulatory without assistance.  Joint Disease:     Neurological:  Pain  , onset is chronic , location of leg and abdomen , alleviating factors are oxycodone 1-2/month. Pain Etiology/Diagnosis, Fibromayalgia  CVA - Cerebrovasular Accident , Most recent CVA was on 40 yrs ago , residual deficits are residual deficit, facial weakness - left.    Endocrine:  Endocrine Normal    Psych:   depression          Physical Exam  General:  Well nourished    Airway/Jaw/Neck:  Airway Findings: Mouth Opening: Small, but > 3cm Tongue: Normal  Jaw/Neck Findings:  Neck ROM: Normal ROM      Dental:  Dental Findings: In tact        Mental Status:  Mental Status Findings:  Cooperative, Alert and Oriented       9/9/20 Covid completed. PCP clearance by Dr Mustafa in [facility] on 9/4/20. Pt is Jehovahs Witness states she signed paperwork allowing for plasma, surgeons office scanned to media.    Anesthesia Assessment: Preoperative EQUATION    Planned Procedure: Procedure(s) (LRB):  CREATION, ILEAL CONDUIT TRANSVERSE COLON CONDUIT ANASTAMOSIS (N/A)  Requested Anesthesia Type:General/MAC  Surgeon: Leslie Balbuena MD  Service: Urology  Known or anticipated Date of Surgery:9/11/2020    Surgeon notes: reviewed    Previous anesthesia records:GETA and No problems   7/28/2020 Colonoscopy    Last PCP note: within 3 months , within [facility]   Subspecialty notes: Dermatology, Gastroenterology, Urology    Other important co-morbidities:Per Epic: GERD, HTN and Osteoarthritis, Stroke, Fibromyalgia       Tests already available:  Available tests,  within 1 month , within [facility] . 8/21/2020 CMP, CBC, EKG             Instructions given. (See in Nurse's note)    Optimization:  Anesthesia Preop Clinic Assessment  Indicated.    Medical Opinion Indicated.          Plan:    Testing:  None Needed   Pre-anesthesia  visit       Visit focus: concerns in complex and/or prolonged anesthesia, patient who requires bloodless surgery (Jehovah Witness)     Consultation:Patient's PCP for a statement of optimization      Patient  has previously  scheduled Medical Appointment: 9/4/2020    Navigation: Tests Scheduled.              Consults scheduled.             Results will be tracked by Preop Clinic.    NOTE:  She is a Faith and will not accept blood transfusion but will accept albumin and clotting factors.

## 2020-09-02 NOTE — TELEPHONE ENCOUNTER
----- Message from Chantal Plata RN sent at 9/2/2020 12:52 PM CDT -----  This patient is scheduled for Ileal conduit creation with colon conduit anastomosis with Dr. Balbuena on 9/11/2020.  (General anesthesia, 210 minutes).  Anesthesia review is in progress.    Is patient optimized/cleared?  Please advise.    Thank you,  Chantal Plata RN  [facility], [facility] [place]

## 2020-09-02 NOTE — TELEPHONE ENCOUNTER
----- Message from Chantal Plata RN sent at 9/2/2020 12:51 PM CDT -----  Surgery date: 9/11/2020  PreOp appt: 9/4/2020    Please call Pt and schedule the following preop appts:    POC    Thank you!  Chantal

## 2020-09-02 NOTE — TELEPHONE ENCOUNTER
Spoke to pt irineo Poc on 09/09 pt reguest to have appt on the same day as her doctor appt  Pt has my chart

## 2020-09-04 ENCOUNTER — OFFICE VISIT (OUTPATIENT)
Dept: INTERNAL MEDICINE | Facility: CLINIC | Age: 55
End: 2020-09-04
Payer: MEDICAID

## 2020-09-04 VITALS
BODY MASS INDEX: 29.33 KG/M2 | HEIGHT: 69 IN | WEIGHT: 198 LBS | SYSTOLIC BLOOD PRESSURE: 118 MMHG | DIASTOLIC BLOOD PRESSURE: 60 MMHG | OXYGEN SATURATION: 99 % | HEART RATE: 60 BPM

## 2020-09-04 DIAGNOSIS — Z01.818 PREOPERATIVE EXAMINATION: ICD-10-CM

## 2020-09-04 DIAGNOSIS — N30.40 RADIATION CYSTITIS: ICD-10-CM

## 2020-09-04 DIAGNOSIS — Z09 HOSPITAL DISCHARGE FOLLOW-UP: Primary | ICD-10-CM

## 2020-09-04 DIAGNOSIS — C77.9 METASTATIC SQUAMOUS CELL CARCINOMA TO LYMPH NODE: Chronic | ICD-10-CM

## 2020-09-04 DIAGNOSIS — K21.00 GASTROESOPHAGEAL REFLUX DISEASE WITH ESOPHAGITIS: Chronic | ICD-10-CM

## 2020-09-04 DIAGNOSIS — I10 ESSENTIAL HYPERTENSION: ICD-10-CM

## 2020-09-04 DIAGNOSIS — N13.1 HYDRONEPHROSIS WITH URETERAL STRICTURE, NOT ELSEWHERE CLASSIFIED: ICD-10-CM

## 2020-09-04 PROBLEM — R93.5 ABNORMAL CT OF THE ABDOMEN: Status: RESOLVED | Noted: 2018-05-18 | Resolved: 2020-09-04

## 2020-09-04 PROBLEM — R26.2 DIFFICULTY WALKING: Status: RESOLVED | Noted: 2017-11-03 | Resolved: 2020-09-04

## 2020-09-04 PROCEDURE — 99999 PR PBB SHADOW E&M-EST. PATIENT-LVL IV: CPT | Mod: PBBFAC,,, | Performed by: STUDENT IN AN ORGANIZED HEALTH CARE EDUCATION/TRAINING PROGRAM

## 2020-09-04 PROCEDURE — 99214 OFFICE O/P EST MOD 30 MIN: CPT | Mod: PBBFAC | Performed by: STUDENT IN AN ORGANIZED HEALTH CARE EDUCATION/TRAINING PROGRAM

## 2020-09-04 PROCEDURE — 99214 PR OFFICE/OUTPT VISIT, EST, LEVL IV, 30-39 MIN: ICD-10-PCS | Mod: S$PBB,,, | Performed by: STUDENT IN AN ORGANIZED HEALTH CARE EDUCATION/TRAINING PROGRAM

## 2020-09-04 PROCEDURE — 99214 OFFICE O/P EST MOD 30 MIN: CPT | Mod: S$PBB,,, | Performed by: STUDENT IN AN ORGANIZED HEALTH CARE EDUCATION/TRAINING PROGRAM

## 2020-09-04 PROCEDURE — 99999 PR PBB SHADOW E&M-EST. PATIENT-LVL IV: ICD-10-PCS | Mod: PBBFAC,,, | Performed by: STUDENT IN AN ORGANIZED HEALTH CARE EDUCATION/TRAINING PROGRAM

## 2020-09-04 NOTE — PROGRESS NOTES
Clinic Note  9/4/2020      Subjective:       Patient ID:  Edita is a 55 y.o. female being seen for an established visit.    Chief Complaint: Follow-up (hospital)    HPI    Edita Riley is a 55 y.o. AA female who has a past medical history of cervical cancer s/p hysterectomy in 2015, with mets to R common iliac lymph node treated with chemo/XRT in 2019 complicated with radiation cystitis and hydronephrosis s/p bilateral nephrostomy tubes, CVA with L sided weakness, HTN, Fibromyalgia, and GERD who presents for hospital follow up.      She has had five ED visits between July and August for abdominal pain associated with nausea and vomiting. However, work up was largely unremarkable for these visits. On 8/12, she presented again with similar symptoms and CT Chest/abdomen/pelvis showed increased left-sided hydronephrosis and hydroureter with urothelial enhancement and surrounding inflammatory changes. She was admitted and her nephrostomy tubes were replaced by IR. She was later discharged on 8/16.     She notes that the b/l nephrostomy tubes are draining well and she has no acute complaints. She denies current back pain, hematuria, or foul smelling urine.She is currently following with Dr. Balbuena and Dr. Reynolds, plans for transverse colon conduit on 9/11/2020.     In regards to the abdominal pain, she states that her abdominal pain occurs mostly when she eats greasy foods such as fried rice and fried chicken. As a result, she now bakes her food and states that she does not experience this pain since starting to do this. Her last episode was on 8/21 when she presented to the ED. She reports compliance with her omeprazole and had remote cholecystectomy in the past. Her last EGD was in 2016. She is currently following with GI, last appointment on 9/1.     Also of note, she reports that she is still taking lisinopril 10mg even though this was discontinued on discharge. It appears that it initially was held during  her recent admission on 8/12-8/16 due to exposure to contrast and concern for possible kidney injury. Of note, it appears that she continued to be normotensive during her admission despite it being held. It was discontinued on discharge but she reports that she continues to take this as she did not understand that it had been stopped.       Review of Systems   Constitutional: Negative for chills and fever.   HENT: Negative for hearing loss and sore throat.    Eyes: Negative for blurred vision and double vision.   Respiratory: Negative for cough and shortness of breath.    Cardiovascular: Negative for chest pain and leg swelling.   Gastrointestinal: Negative for abdominal pain, constipation, diarrhea, nausea and vomiting.   Genitourinary: Negative for dysuria and hematuria.   Musculoskeletal: Negative for falls and myalgias.   Skin: Positive for rash.   Neurological: Negative for dizziness, speech change, weakness and headaches.   Psychiatric/Behavioral: Negative for depression. The patient is not nervous/anxious.        Past Medical History:   Diagnosis Date    Abnormal mammogram 8/25/2020    Anxiety     Cervical cancer 2014    Chronic back pain     Depression     Diarrhea due to malabsorption 11/14/2018    Fibromyalgia     Generalized abdominal pain 8/25/2020    GERD (gastroesophageal reflux disease)     Hiatal hernia 2014    History of cervical cancer 10/11/2018    History of cervical cancer 10/11/2018    Hx of psychiatric care     Cymbalta, trazodone    Hypertension     Hypomagnesemia 11/21/2018    Lactose intolerance     Metastatic squamous cell carcinoma to lymph node 10/11/2018    Nephrostomy tube displaced     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 drug 11/14/2018    Osteoarthritis of back     Psychiatric problem     Stroke 1972       Family History   Problem Relation Age of Onset    Heart disease Sister     Heart disease Maternal Grandmother     Colon cancer Father     Esophageal cancer  Father     Cancer Father         Lung-smoker    Cancer Mother         Cervical    Cervical cancer Mother     Breast cancer Maternal Aunt     Suicide Daughter         jumped from parking structure    Drug abuse Daughter     Drug abuse Daughter     Rectal cancer Neg Hx     Stomach cancer Neg Hx     Crohn's disease Neg Hx     Ulcerative colitis Neg Hx     Diabetes Neg Hx     Hypertension Neg Hx         reports that she has quit smoking. She has never used smokeless tobacco. She reports that she does not drink alcohol or use drugs.    Medication List with Changes/Refills   Current Medications    DICYCLOMINE (BENTYL) 20 MG TABLET    Take 1 tablet (20 mg total) by mouth 3 (three) times daily as needed.    GABAPENTIN (NEURONTIN) 300 MG CAPSULE    Take 1 capsule (300 mg total) by mouth 3 (three) times daily.    KETOCONAZOLE (NIZORAL) 2 % CREAM    Apply topically once daily to affected area    OMEPRAZOLE (PRILOSEC) 40 MG CAPSULE    Take 1 capsule (40 mg total) by mouth once daily.    ONDANSETRON (ZOFRAN-ODT) 4 MG TBDL    Take 1 tablet (4 mg total) by mouth every 8 (eight) hours as needed (nausea or vomiting).    OXYCODONE (ROXICODONE) 5 MG IMMEDIATE RELEASE TABLET    Take 1 tablet (5 mg total) by mouth every 8 (eight) hours as needed for Pain.    POLYETHYLENE GLYCOL (GLYCOLAX) 17 GRAM/DOSE POWDER    Take 17 g by mouth once daily.    TRAZODONE (DESYREL) 50 MG TABLET    TAKE 1 TABLET (50 MG TOTAL) BY MOUTH EVERY EVENING.     Review of patient's allergies indicates:   Allergen Reactions    Bee sting [allergen ext-venom-honey bee]      Rash      Grass pollen-bermuda, standard      rash       Patient Active Problem List   Diagnosis    Osteoarthritis of back    Hiatal hernia    Essential hypertension    Lower extremity pain, diffuse    Weakness of both lower extremities    Impaired functional mobility, balance, gait, and endurance    Schatzki's ring    Colon polyp    Internal hemorrhoids    Cardiovascular  "event risk -- low    Hemifacial spasm    Severe episode of recurrent major depressive disorder, with psychotic features    Fibromyalgia    Facial palsy    Sleep stage dysfunction    Carpal tunnel syndrome on right    Weakness    Difficulty walking    Gastroesophageal reflux disease with esophagitis    Abnormal CT of the abdomen    Lymphadenopathy    History of cervical cancer    Metastatic squamous cell carcinoma to lymph node    Generalized anxiety disorder    PTSD (post-traumatic stress disorder)    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 drug    Diarrhea due to malabsorption    Seizure-like activity    Stroke    Electrolyte disorder    ODESSA (acute kidney injury)    Hydronephrosis of right kidney    Anemia due to chronic kidney disease    Need for shingles vaccine    Hydronephrosis    Urinary tract infection associated with nephrostomy catheter    Family history of colon cancer    Nephrostomy tube displaced    Generalized abdominal pain    Abnormal mammogram           Objective:      /60 (BP Location: Right arm, Patient Position: Sitting, BP Method: Large (Manual))   Pulse 60   Ht 5' 9" (1.753 m)   Wt 89.8 kg (197 lb 15.6 oz)   LMP 06/08/2014 (Approximate)   SpO2 99%   BMI 29.24 kg/m²   Estimated body mass index is 29.24 kg/m² as calculated from the following:    Height as of this encounter: 5' 9" (1.753 m).    Weight as of this encounter: 89.8 kg (197 lb 15.6 oz).  Physical Exam   Constitutional: She is well-developed, well-nourished, and in no distress. No distress.   HENT:   Head: Normocephalic.   Eyes: Right eye exhibits no discharge. Left eye exhibits no discharge.   Cardiovascular: Normal rate and normal heart sounds. Exam reveals no gallop and no friction rub.   No murmur heard.  Pulmonary/Chest: She has no wheezes. She has no rales.   Abdominal: Bowel sounds are normal. There is no abdominal tenderness. There is no rebound.   Musculoskeletal:         General: No edema. "      Comments: Bilateral draining nephrostomy tubes, draining yellow urine into nephrostomy bags, no purulent drainage, blood, or discoloration noted in bag.   Skin: Skin is warm and dry. She is not diaphoretic.   Nursing note and vitals reviewed.        Assessment and Plan:         Edita was seen today for follow-up.    Diagnoses and all orders for this visit:    1. Hospital discharge follow-up    2. Preoperative examination:  She has had negative stress test in April 2019. No history of CHF. She reports history of CVA at the age of 7. She is not a diabetic.     Revised Cardiac Risk Index:   High risk surgery: Yes  History of ischemic heart disease: No  History of congestive heart failure: No  History of stroke: Yes  Insulin dependent diabetes: No  Cr > 2: No  2 point, class III risk, 10% risk of cardiac event or death      According to the revised cardiac risk index, her preoperative risk stratification remains a Class III risk (2 points for a high risk surgery) of 10% of MI, death, or cardiac event in 30 days. This has been discussed with the patient and she voices understanding. As such, she is optimized and there is no reason, from an internal medicine standpoint, that would preclude her from proceeding with the procedure.      3. Essential hypertension  Lisinopril held during recent admission due to concern for exposure to contrast to prevent kidney injury. As patient normotensive during admission, it was discontinued on discharge but she continues to take it.   /60 at goal today so appears that she may not need it.  -will stop taking lisinopril   -Discussed taking BP with BP monitor at home to monitor for elevated BP    4. Hydronephrosis due to radiation cystitis s/p bilateral nephrostomy tubes:  Plan for conduit surgery on 9/11 with Dr. Balbuena and Dr. Reynolds    5. Gastroesophageal reflux disease with esophagitis  -continue omeprazole    6. Abdominal Pain:  Currently denies abdominal symptoms, last  episode on 8/21. Currently following with GI  -encouraged to avoid greasy foods as she reports this worsens abdominal pain and n/v    7. Metastatic squamous cell carcinoma to lymph node  -currently following with Dr. Lemon, no evidence of active disease per last PET scan      Will follow-up in 1 month.    Other Orders Placed This Visit:  No orders of the defined types were placed in this encounter.        Audrey Mustafa    Discussed with Dr. Ceballos

## 2020-09-04 NOTE — PROGRESS NOTES
Subjective:       Patient ID: Edita Riley is a 55 y.o. female.    Chief Complaint: Abdominal Pain    56 y/o female referred for abdominal pain. PMH of hypertension, GERD, cervical cancer s/p hysterectomy in 2015, with mets to R common iliac lymph node treated with chemo/XRT in 2019 complicated with radiation cystitis and hydronephrosis s/p bilateral nephrostomy tubes. Multiple ED visit within the past 2 months for abdominal pain. Workup negative for acute findings. Per patient nephrostomy tubes are very uncomfortable and not draining properly. She is scheduled for transverse colon conduit with colon rectal surgeon on 9/11/2020. Patient reports no abdominal in almost 2 weeks. She has been avoiding fatty and fried foods. Hx GERD and taking omeprazole daily. Reports normal BM EOD. Denies n/v, hematemesis, blood in stool, or melena. Colonoscopy in July was normal.         Past Medical History:   Diagnosis Date    Abnormal mammogram 8/25/2020    Anxiety     Cervical cancer 2014    Chronic back pain     Depression     Diarrhea due to malabsorption 11/14/2018    Fibromyalgia     Generalized abdominal pain 8/25/2020    GERD (gastroesophageal reflux disease)     Hiatal hernia 2014    History of cervical cancer 10/11/2018    History of cervical cancer 10/11/2018    Hx of psychiatric care     Cymbalta, trazodone    Hypertension     Hypomagnesemia 11/21/2018    Lactose intolerance     Metastatic squamous cell carcinoma to lymph node 10/11/2018    Nephrostomy tube displaced     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 drug 11/14/2018    Osteoarthritis of back     Psychiatric problem     Stroke 1972       Past Surgical History:   Procedure Laterality Date    BILATERAL OOPHORECTOMY  2015    CHOLECYSTECTOMY  11/09/2016    Done at [facility], path showed chronic cholecystitis and gallstones    cold knife conization  2014    COLONOSCOPY  2014    COLONOSCOPY N/A 10/24/2016    at [facility], Dr Thomas     COLONOSCOPY N/A 5/18/2018    Procedure: COLONOSCOPY;  Surgeon: Arden Gutiérrez MD;  Location: [facility] ENDO (4TH FLR);  Service: Endoscopy;  Laterality: N/A;    COLONOSCOPY N/A 7/28/2020    Procedure: COLONOSCOPY;  Surgeon: Hammad Reynolds MD;  Location: [facility] ENDO (4TH FLR);  Service: Colon and Rectal;  Laterality: N/A;  covid test [place] 7/25    CYSTOSCOPY WITH URETEROSCOPY, RETROGRADE PYELOGRAPHY, AND INSERTION OF STENT Bilateral 3/21/2020    Procedure: CYSTOSCOPY, WITH RETROGRADE PYELOGRAM,;  Surgeon: Leslie Balbuena MD;  Location: [facility] OR 1ST FLR;  Service: Urology;  Laterality: Bilateral;    ESOPHAGOGASTRODUODENOSCOPY  2014    HYSTERECTOMY  2014    TVH for cervical cancer    OOPHORECTOMY      RETROPERITONEAL LYMPHADENECTOMY Right 9/17/2018    Procedure: LYMPHADENECTOMY, RETROPERITONEUM;  Surgeon: Sebas Reed MD;  Location: [facility] OR 2ND FLR;  Service: General;  Laterality: Right;  ILIAC       Family History   Problem Relation Age of Onset    Heart disease Sister     Heart disease Maternal Grandmother     Colon cancer Father     Esophageal cancer Father     Cancer Father         Lung-smoker    Cancer Mother         Cervical    Cervical cancer Mother     Breast cancer Maternal Aunt     Suicide Daughter         jumped from parking structure    Drug abuse Daughter     Drug abuse Daughter     Rectal cancer Neg Hx     Stomach cancer Neg Hx     Crohn's disease Neg Hx     Ulcerative colitis Neg Hx     Diabetes Neg Hx     Hypertension Neg Hx        Social History     Socioeconomic History    Marital status:      Spouse name: Hammad    Number of children: 2    Years of education: Not on file    Highest education level: Not on file   Occupational History    Not on file   Social Needs    Financial resource strain: Not on file    Food insecurity     Worry: Not on file     Inability: Not on file    Transportation needs     Medical: Not on file     Non-medical: Not on file   Tobacco  "Use    Smoking status: Former Smoker    Smokeless tobacco: Never Used   Substance and Sexual Activity    Alcohol use: No     Alcohol/week: 0.0 standard drinks    Drug use: No    Sexual activity: Yes     Partners: Male     Birth control/protection: None     Comment:  19 years since    Lifestyle    Physical activity     Days per week: Not on file     Minutes per session: Not on file    Stress: Not on file   Relationships    Social connections     Talks on phone: Not on file     Gets together: Not on file     Attends Buddhist service: Not on file     Active member of club or organization: Not on file     Attends meetings of clubs or organizations: Not on file     Relationship status: Not on file   Other Topics Concern    Patient feels they ought to cut down on drinking/drug use Not Asked    Patient annoyed by others criticizing their drinking/drug use Not Asked    Patient has felt bad or guilty about drinking/drug use Not Asked    Patient has had a drink/used drugs as an eye opener in the AM Not Asked   Social History Narrative    , twin daughters (1  2018), disabled due to childhood stroke, [employer] sophia       Review of Systems   Constitutional: Negative for fatigue, fever and unexpected weight change.   HENT: Negative for trouble swallowing.    Respiratory: Negative for shortness of breath.    Cardiovascular: Negative for chest pain.   Gastrointestinal: Positive for abdominal pain.   Musculoskeletal: Positive for arthralgias.   Neurological: Negative for dizziness and weakness.   Hematological: Negative for adenopathy. Does not bruise/bleed easily.   Psychiatric/Behavioral: Negative for dysphoric mood.         Objective:     Vitals:    20 1356   BP: 114/64   BP Location: Right arm   Patient Position: Sitting   BP Method: Large (Manual)   Pulse: 69   Temp: 98 °F (36.7 °C)   TempSrc: Temporal   SpO2: 99%   Weight: 89.2 kg (196 lb 9.6 oz)   Height: 5' 9" " (1.753 m)          Physical Exam  Constitutional:       General: She is not in acute distress.     Appearance: Normal appearance. She is not ill-appearing.   HENT:      Head: Normocephalic.   Eyes:      Conjunctiva/sclera: Conjunctivae normal.      Pupils: Pupils are equal, round, and reactive to light.   Cardiovascular:      Rate and Rhythm: Normal rate and regular rhythm.   Pulmonary:      Effort: Pulmonary effort is normal.      Breath sounds: Normal breath sounds.   Abdominal:      General: Bowel sounds are normal.      Palpations: Abdomen is soft.      Tenderness: There is no abdominal tenderness.   Musculoskeletal:      Comments: Bilateral nephrostomy tubes   Skin:     General: Skin is warm and dry.   Neurological:      Mental Status: She is alert and oriented to person, place, and time.   Psychiatric:         Mood and Affect: Mood normal.         Behavior: Behavior normal.             Assessment:         ICD-10-CM ICD-9-CM   1. Generalized abdominal pain  R10.84 789.07   2. Gastroesophageal reflux disease, esophagitis presence not specified  K21.9 530.81       Plan:       Generalized abdominal pain  -     dicyclomine (BENTYL) 20 mg tablet; Take 1 tablet (20 mg total) by mouth 3 (three) times daily as needed.  Dispense: 60 tablet; Refill: 2  -     polyethylene glycol (GLYCOLAX) 17 gram/dose powder; Take 17 g by mouth once daily.  Dispense: 510 g; Refill: 11    Gastroesophageal reflux disease, esophagitis presence not specified        -     Continue daily omeprazole as prescribed. If symptoms worsen or fail to improve, may repeat EGD.     Follow up in about 1 month (around 10/1/2020) for If symptoms worsen or fail to improve.     Patient's Medications   New Prescriptions    DICYCLOMINE (BENTYL) 20 MG TABLET    Take 1 tablet (20 mg total) by mouth 3 (three) times daily as needed.    POLYETHYLENE GLYCOL (GLYCOLAX) 17 GRAM/DOSE POWDER    Take 17 g by mouth once daily.   Previous Medications    GABAPENTIN  (NEURONTIN) 300 MG CAPSULE    Take 1 capsule (300 mg total) by mouth 3 (three) times daily.    KETOCONAZOLE (NIZORAL) 2 % CREAM    Apply topically once daily to affected area    OMEPRAZOLE (PRILOSEC) 40 MG CAPSULE    Take 1 capsule (40 mg total) by mouth once daily.    ONDANSETRON (ZOFRAN-ODT) 4 MG TBDL    Take 1 tablet (4 mg total) by mouth every 8 (eight) hours as needed (nausea or vomiting).    OXYCODONE (ROXICODONE) 5 MG IMMEDIATE RELEASE TABLET    Take 1 tablet (5 mg total) by mouth every 8 (eight) hours as needed for Pain.    TRAZODONE (DESYREL) 50 MG TABLET    TAKE 1 TABLET (50 MG TOTAL) BY MOUTH EVERY EVENING.   Modified Medications    No medications on file   Discontinued Medications    No medications on file

## 2020-09-07 NOTE — PROGRESS NOTES
I have reviewed and concur with the resident's history, physical, assessment, and plan.  I have personally interviewed and examined the patient  Edita Riley at bedside.     Sanford Ceballos MD

## 2020-09-08 ENCOUNTER — LAB VISIT (OUTPATIENT)
Dept: SURGERY | Facility: CLINIC | Age: 55
End: 2020-09-08
Payer: MEDICAID

## 2020-09-08 DIAGNOSIS — N13.30 BILATERAL HYDRONEPHROSIS: ICD-10-CM

## 2020-09-08 DIAGNOSIS — N13.1 HYDRONEPHROSIS WITH URETERAL STRICTURE, NOT ELSEWHERE CLASSIFIED: ICD-10-CM

## 2020-09-08 DIAGNOSIS — Z01.818 PREOP EXAMINATION: ICD-10-CM

## 2020-09-08 DIAGNOSIS — N13.5 BILATERAL URETERAL OBSTRUCTION: ICD-10-CM

## 2020-09-08 PROCEDURE — U0003 INFECTIOUS AGENT DETECTION BY NUCLEIC ACID (DNA OR RNA); SEVERE ACUTE RESPIRATORY SYNDROME CORONAVIRUS 2 (SARS-COV-2) (CORONAVIRUS DISEASE [COVID-19]), AMPLIFIED PROBE TECHNIQUE, MAKING USE OF HIGH THROUGHPUT TECHNOLOGIES AS DESCRIBED BY CMS-2020-01-R: HCPCS

## 2020-09-09 ENCOUNTER — TELEPHONE (OUTPATIENT)
Dept: UROLOGY | Facility: CLINIC | Age: 55
End: 2020-09-09

## 2020-09-09 ENCOUNTER — HOSPITAL ENCOUNTER (OUTPATIENT)
Dept: PREADMISSION TESTING | Facility: HOSPITAL | Age: 55
Discharge: HOME | End: 2020-09-09
Attending: ANESTHESIOLOGY
Payer: MEDICAID

## 2020-09-09 ENCOUNTER — OFFICE VISIT (OUTPATIENT)
Dept: WOUND CARE | Facility: CLINIC | Age: 55
End: 2020-09-09
Payer: MEDICAID

## 2020-09-09 ENCOUNTER — TELEPHONE (OUTPATIENT)
Dept: SURGERY | Facility: CLINIC | Age: 55
End: 2020-09-09

## 2020-09-09 VITALS
HEIGHT: 69 IN | DIASTOLIC BLOOD PRESSURE: 63 MMHG | TEMPERATURE: 99 F | BODY MASS INDEX: 29.47 KG/M2 | HEART RATE: 78 BPM | OXYGEN SATURATION: 97 % | WEIGHT: 199 LBS | SYSTOLIC BLOOD PRESSURE: 136 MMHG

## 2020-09-09 DIAGNOSIS — Z71.89 ENCOUNTER FOR OSTOMY CARE EDUCATION: ICD-10-CM

## 2020-09-09 DIAGNOSIS — Z43.6 ATTENTION TO UROSTOMY: Primary | ICD-10-CM

## 2020-09-09 DIAGNOSIS — N32.0 BLADDER OUTLET OBSTRUCTION: Primary | ICD-10-CM

## 2020-09-09 LAB
FINAL PATHOLOGIC DIAGNOSIS: NORMAL
Lab: NORMAL
SARS-COV-2 RNA RESP QL NAA+PROBE: NOT DETECTED

## 2020-09-09 PROCEDURE — 99999 PR PBB SHADOW E&M-EST. PATIENT-LVL II: CPT | Mod: PBBFAC,,, | Performed by: CLINICAL NURSE SPECIALIST

## 2020-09-09 PROCEDURE — 99999 PR PBB SHADOW E&M-EST. PATIENT-LVL II: ICD-10-PCS | Mod: PBBFAC,,, | Performed by: CLINICAL NURSE SPECIALIST

## 2020-09-09 PROCEDURE — 99213 PR OFFICE/OUTPT VISIT, EST, LEVL III, 20-29 MIN: ICD-10-PCS | Mod: S$PBB,,, | Performed by: CLINICAL NURSE SPECIALIST

## 2020-09-09 PROCEDURE — 99212 OFFICE O/P EST SF 10 MIN: CPT | Mod: PBBFAC | Performed by: CLINICAL NURSE SPECIALIST

## 2020-09-09 PROCEDURE — 99213 OFFICE O/P EST LOW 20 MIN: CPT | Mod: S$PBB,,, | Performed by: CLINICAL NURSE SPECIALIST

## 2020-09-09 RX ORDER — NEOMYCIN SULFATE 500 MG/1
1000 TABLET ORAL SEE ADMIN INSTRUCTIONS
Qty: 6 TABLET | Refills: 0 | Status: ON HOLD | OUTPATIENT
Start: 2020-09-09 | End: 2020-12-03 | Stop reason: HOSPADM

## 2020-09-09 RX ORDER — METRONIDAZOLE 500 MG/1
500 TABLET ORAL SEE ADMIN INSTRUCTIONS
Qty: 3 TABLET | Refills: 0 | Status: ON HOLD | OUTPATIENT
Start: 2020-09-09 | End: 2020-12-03 | Stop reason: HOSPADM

## 2020-09-09 NOTE — PROGRESS NOTES
This is a new [facility] who is here today for pre op teaching and pre op site marking for upcoming urostomy, to have transverse colonic conduit by Dr Balbuena on 9/11/20. The patient comes alone today but has spouse.      Review of Systems   Constitutional: Negative for activity change and appetite change.  Respiratory: Negative for cough and shortness of breath.    Cardiovascular: Negative.    Gastrointestinal: Negative.    Genitourinary:    NEPH tubes in place    Musculoskeletal: Negative for arthralgias and back pain.   Skin: Negative.    Neurological: Negative for weakness and headaches.   Psychiatric/Behavioral: Negative.        Objective:      Physical Exam   Constitutional:  Appears well-developed and well-nourished.   Pulmonary/Chest: Effort normal. No respiratory distress.  no wheezes.   Abdominal: Soft. Bowel sounds are normal. Exhibits no distension. Note her right side is skewed a bit and leans to right a bit when sitting.  Musculoskeletal: Normal range of motion. Exhibits no edema.    Skin: Skin is warm and dry. No erythema.     Pre op Ostomy marking:     This patient was seen today per request  in preparation for upcoming ostomy surgery on 9/11 Dr Reynolds to assist  Stoma marking/siting was performed per protocol and patient marked with a permanent marker and skin staining with silver nitrate.             Marked on both sides in case   The proposed surgery was discussed and patient educated on expected postoperative course and expectations. The patient was allowed to ask questions and was also given pre-op information kit from  the [employer] for review at home prior to surgery.       Assessment:       1. Attention to urostomy    2. Encounter for ostomy care education        Plan:       Pre op siting for urinary diversion /urostomy  Pre op teaching with aid of [employer] ( [employer] ) pre op skills kit  Allowed to ask and answered all  questions  I have reviewed the plan of care with the patient  and she expresses understanding. I spent over 50% of this 20 minute visit in face to face counseling.

## 2020-09-09 NOTE — PRE-PROCEDURE INSTRUCTIONS
Preop instructions/AVS given, pt verbalized understanding.   Spoke with pharmacies they are verifying the paper rx pt has in hand.

## 2020-09-09 NOTE — TELEPHONE ENCOUNTER
----- Message from Gissel Whiteside sent at 9/9/2020  9:14 AM CDT -----  Regarding: Concerns  Contact: Federico Riley- [phone]  Pt daughter and  calling to get information regarding the procedure that Mrs. Riley is having on Friday. The pt  stated that its okay for you to talk to the daughter Federico regarding any medical problems. Please give Federico Soria [phone] a call regarding her mother procedure.

## 2020-09-09 NOTE — LETTER
September 9, 2020      Leslie Balbuena MD  [address]  [facility] [address]           [address]  [facility] [address]  [address]  [facility] [address]  Phone: [phone]          Patient: Edita Riley   MR Number: [id number]   YOB: 1965   Date of Visit: 9/9/2020       Dear Dr. Leslie Balbuena:    Thank you for referring Edita Riley to me for evaluation. Attached you will find relevant portions of my assessment and plan of care.    If you have questions, please do not hesitate to call me. I look forward to following Edita Riley along with you.    Sincerely,    Paulina Gilbert, CNS    Enclosure  CC:  No Recipients    If you would like to receive this communication electronically, please contact [email or web address] or [phone] to request more information on [facility] access.    For providers and/or their staff who would like to refer a patient to [facility], please contact us through our one-stop-shop provider referral line, [facility], at [phone].    If you feel you have received this communication in error or would no longer like to receive these types of communications, please e-mail [email or web address]

## 2020-09-09 NOTE — DISCHARGE INSTRUCTIONS
Your surgery has been scheduled for:__________________________________________    You should report to:  ____[facility], located on the [place] side of the first floor of the           [facility] ([phone])  ____[facility], located on the [facility] side of the            Second floor of the [facility] ([phone])  ____[facility] ([facility]) [facility] [address]  Please Note   - Tell your doctor if you take Aspirin, products containing Aspirin, herbal medications  or blood thinners, such as Coumadin, Ticlid, or Plavix.  (Consult your provider regarding holding or stopping before surgery).  - Arrange for someone to drive you home following surgery.  You will not be allowed to leave the surgical facility alone or drive yourself home following sedation and anesthesia.  Before Surgery  - Stop taking all herbal medications 14days prior to surgery  - No Motrin/Advil (Ibuprofen) 7 days before surgery  - No Aleve (Naproxen) 7 days before surgery  - Stop Taking Aspirin, products containing Aspirin _____days before surgery  - Stop taking blood thinners_______days before surgery  - No Goody's/BC  Powder 7 days before surgery  - Refrain from drinking alcoholic beverages for 24hours before and after surgery  - Stop or limit smoking _________days before surgery  - You may take Tylenol for pain  Night before Surgery   Stop ALL solid food, gum, candy (including vitamins) 8 hours before arrival time.  (Please note: If your surgeon gives you different eating and drinking instructions, please follow surgeon's directions.)   Stop all CLOUDY liquids: coffee with creamer, formula, tube feeds, cloudy juices, non-human milk and breast milk with additives, 6 hours prior to arrival time.   Stop plain breast milk 4 hours prior to arrival time.   The patient should be ENCOURAGED to drink carbohydrate-rich clear liquids (sports  drinks, clear juices) until 2 hours prior to arrival time.   CLEAR liquids include only water, black coffee NO creamer, clear oral rehydration drinks, clear sports drinks or clear fruit juices (no orange juice, no pulpy juices, no apple cider). Advise patients if they can read newsprint through the liquid, it qualifies as clear liquid.    IF IN DOUBT, drink water instead.   - Take a shower or bath (shower is recommended).  Bathe with Hibiclens soap or an antibacterial soap from the neck down.  If not supplied by your surgeon, hibiclens soap will need to be purchased over the counter in pharmacy.  Rinse soap off thoroughly.  - Shampoo your hair with your regular shampoo  The Day of Surgery  · NOTHING TO  DRINK 2 hours before arrival time. If you are told to take medication on the morning of surgery, it may be taken with a sip of water.   - Take another bath or shower with hibiclens or any antibacterial soap, to reduce the chance of infection.  - Take heart and blood pressure medications with a small sip of water, as advised by the perioperative team.  - Do not take fluid pills  - You may brush your teeth and rinse your mouth, but do not swallow any additional water.   - Do not apply perfumes, powder, body lotions or deodorant on the day of surgery.  - Nail polish should be removed.  - Do not wear makeup or moisturizer  - Wear comfortable clothes, such as a button front shirt and loose fitting pants.  - Leave all jewelry, including body piercings, and valuables at home.    - Bring any devices you will neeed after surgery such as crutches or canes.  - If you have sleep apnea, please bring your CPAP machine  In the event that your physical condition changes including the onset of a cold or respiratory illness, or if you have to delay or cancel your surgery, please notify your surgeon.  Anesthesia: General Anesthesia     You are watched continuously during your procedure by your anesthesia provider.     Youre due to  have surgery. During surgery, youll be given medicine called anesthesia or anesthetic. This will keep you comfortable and pain-free. Your anesthesia provider will use general anesthesia.  What is general anesthesia?  General anesthesia puts you into a state like deep sleep. It goes into the bloodstream (IV anesthetics), into the lungs (gas anesthetics), or both. You feel nothing during the procedure. You will not remember it. During the procedure, the anesthesia provider monitors you continuously. He or she checks your heart rate and rhythm, blood pressure, breathing, and blood oxygen.  · IV anesthetics. IV anesthetics are given through an IV line in your arm. Theyre often given first. This is so you are asleep before a gas anesthetic is started. Some kinds of IV anesthetics relieve pain. Others relax you. Your doctor will decide which kind is best in your case.  · Gas anesthetics. Gas anesthetics are breathed into the lungs. They are often used to keep you asleep. They can be given through a facemask or a tube placed in your larynx or trachea (breathing tube).  ? If you have a facemask, your anesthesia provider will most likely place it over your nose and mouth while youre still awake. Youll breathe oxygen through the mask as your IV anesthetic is started. Gas anesthetic may be added through the mask.  ? If you have a tube in the larynx or trachea, it will be inserted into your throat after youre asleep.  Anesthesia tools and medicines  You will likely have:  · IV anesthetics. These are put into an IV line into your bloodstream.  · Gas anesthetics. You breathe these anesthetics into your lungs, where they pass into your bloodstream.  · Pulse oximeter. This is a small clip that is attached to the end of your finger. This measures your blood oxygen level.  · Electrocardiography leads (electrodes). These are small sticky pads that are placed on your chest. They record your heart rate and rhythm.  · Blood pressure  cuff. This reads your blood pressure.  Risks and possible complications  General anesthesia has some risks. These include:  · Breathing problems  · Nausea and vomiting  · Sore throat or hoarseness (usually temporary)  · Allergic reaction to the anesthetic  · Irregular heartbeat (rare)  · Cardiac arrest (rare)   Anesthesia safety  · Follow all instructions you are given for how long not to eat or drink before your procedure.  · Be sure your doctor knows what medicines and drugs you take. This includes over-the-counter medicines, herbs, supplements, alcohol or other drugs. You will be asked when those were last taken.  · Have an adult family member or friend drive you home after the procedure.  · For the first 24 hours after your surgery:  ? Do not drive or use heavy equipment.  ? Do not make important decisions or sign legal documents. If important decisions or signing legal documents is necessary during the first 24 hours after surgery, have a trusted family member or spouse act on your behalf.  ? Avoid alcohol.  ? Have a responsible adult stay with you. He or she can watch for problems and help keep you safe.  Date Last Reviewed: 12/1/2016 © 2000-2017 [employer]. [address] [place] [id number]. All rights reserved. This information is not intended as a substitute for professional medical care. Always follow your healthcare professional's instructions

## 2020-09-09 NOTE — TELEPHONE ENCOUNTER
Spoke with patient. Calling to review prep instructions, for surgery on Friday 9/11. Patient does not have patient portal, nor email, unable to send hard copy of prep. Patient states she is on the second floor pre op center, I ran down to meet her. Reviewed prep and put phone number on prep sheet in case patient had any questions.

## 2020-09-10 ENCOUNTER — TELEPHONE (OUTPATIENT)
Dept: UROLOGY | Facility: CLINIC | Age: 55
End: 2020-09-10

## 2020-09-10 ENCOUNTER — ANESTHESIA EVENT (OUTPATIENT)
Dept: SURGERY | Facility: HOSPITAL | Age: 55
DRG: 659 | End: 2020-09-10
Payer: MEDICAID

## 2020-09-10 NOTE — TELEPHONE ENCOUNTER
Called and spoke to Federico and described the patient's diagnosis, workup and reason for surgical intervention.  Also discussed the anatomy and surgery in detail.  Risks, benefits and potential complications were discussed.  30 minutes was spent on the phone and questions were answered.

## 2020-09-10 NOTE — TELEPHONE ENCOUNTER
Called pt to confirm arrival time of 5:30 for procedure on 9/11/2020. Gave pt NPO instructions and gave pt opportunity to ask questions. Pt verbalized understanding.

## 2020-09-11 ENCOUNTER — ANESTHESIA (OUTPATIENT)
Dept: SURGERY | Facility: HOSPITAL | Age: 55
DRG: 659 | End: 2020-09-11
Payer: MEDICAID

## 2020-09-11 ENCOUNTER — HOSPITAL ENCOUNTER (INPATIENT)
Facility: HOSPITAL | Age: 55
LOS: 32 days | Discharge: NURSING, REHAB OR LONG-TERM CARE | DRG: 659 | End: 2020-10-13
Attending: UROLOGY | Admitting: UROLOGY
Payer: MEDICAID

## 2020-09-11 DIAGNOSIS — T81.49XA WOUND INFECTION AFTER SURGERY: ICD-10-CM

## 2020-09-11 DIAGNOSIS — E44.0 MODERATE MALNUTRITION: ICD-10-CM

## 2020-09-11 DIAGNOSIS — Z74.09 IMPAIRED FUNCTIONAL MOBILITY, BALANCE, GAIT, AND ENDURANCE: Chronic | ICD-10-CM

## 2020-09-11 DIAGNOSIS — N18.9 ANEMIA DUE TO CHRONIC KIDNEY DISEASE, UNSPECIFIED CKD STAGE: ICD-10-CM

## 2020-09-11 DIAGNOSIS — N13.30 HYDRONEPHROSIS, UNSPECIFIED HYDRONEPHROSIS TYPE: ICD-10-CM

## 2020-09-11 DIAGNOSIS — N13.5 OBSTRUCTION OF URETER, UNSPECIFIED LATERALITY: ICD-10-CM

## 2020-09-11 DIAGNOSIS — D63.1 ANEMIA DUE TO CHRONIC KIDNEY DISEASE, UNSPECIFIED CKD STAGE: ICD-10-CM

## 2020-09-11 DIAGNOSIS — R00.0 TACHYCARDIA: ICD-10-CM

## 2020-09-11 DIAGNOSIS — E87.6 HYPOKALEMIA: ICD-10-CM

## 2020-09-11 DIAGNOSIS — D62 ACUTE BLOOD LOSS ANEMIA: ICD-10-CM

## 2020-09-11 DIAGNOSIS — B37.7 CANDIDEMIA: ICD-10-CM

## 2020-09-11 DIAGNOSIS — N13.5 BILATERAL URETERAL OBSTRUCTION: Primary | ICD-10-CM

## 2020-09-11 DIAGNOSIS — B49 FUNGEMIA: ICD-10-CM

## 2020-09-11 DIAGNOSIS — R10.84 GENERALIZED ABDOMINAL PAIN: ICD-10-CM

## 2020-09-11 DIAGNOSIS — K65.9 PERITONITIS: ICD-10-CM

## 2020-09-11 DIAGNOSIS — D50.0 ANEMIA DUE TO CHRONIC BLOOD LOSS: ICD-10-CM

## 2020-09-11 DIAGNOSIS — N17.9 AKI (ACUTE KIDNEY INJURY): ICD-10-CM

## 2020-09-11 DIAGNOSIS — F33.3 SEVERE EPISODE OF RECURRENT MAJOR DEPRESSIVE DISORDER, WITH PSYCHOTIC FEATURES: Chronic | ICD-10-CM

## 2020-09-11 DIAGNOSIS — I10 ESSENTIAL HYPERTENSION: Chronic | ICD-10-CM

## 2020-09-11 LAB
ABO + RH BLD: NORMAL
ANION GAP SERPL CALC-SCNC: 16 MMOL/L (ref 8–16)
ANISOCYTOSIS BLD QL SMEAR: SLIGHT
BASOPHILS # BLD AUTO: 0.02 K/UL (ref 0–0.2)
BASOPHILS NFR BLD: 0.2 % (ref 0–1.9)
BLD GP AB SCN CELLS X3 SERPL QL: NORMAL
BUN SERPL-MCNC: 10 MG/DL (ref 6–20)
CALCIUM SERPL-MCNC: 6.9 MG/DL (ref 8.7–10.5)
CHLORIDE SERPL-SCNC: 111 MMOL/L (ref 95–110)
CO2 SERPL-SCNC: 15 MMOL/L (ref 23–29)
CREAT SERPL-MCNC: 1.1 MG/DL (ref 0.5–1.4)
DIFFERENTIAL METHOD: ABNORMAL
EOSINOPHIL # BLD AUTO: 0 K/UL (ref 0–0.5)
EOSINOPHIL NFR BLD: 0 % (ref 0–8)
ERYTHROCYTE [DISTWIDTH] IN BLOOD BY AUTOMATED COUNT: 15.7 % (ref 11.5–14.5)
EST. GFR  (AFRICAN AMERICAN): >60 ML/MIN/1.73 M^2
EST. GFR  (NON AFRICAN AMERICAN): 55.9 ML/MIN/1.73 M^2
GLUCOSE SERPL-MCNC: 135 MG/DL (ref 70–110)
HCT VFR BLD AUTO: 33 % (ref 37–48.5)
HGB BLD-MCNC: 9.7 G/DL (ref 12–16)
HYPOCHROMIA BLD QL SMEAR: ABNORMAL
IMM GRANULOCYTES # BLD AUTO: 0.07 K/UL (ref 0–0.04)
IMM GRANULOCYTES NFR BLD AUTO: 0.5 % (ref 0–0.5)
LYMPHOCYTES # BLD AUTO: 1 K/UL (ref 1–4.8)
LYMPHOCYTES NFR BLD: 7.3 % (ref 18–48)
MAGNESIUM SERPL-MCNC: 2.1 MG/DL (ref 1.6–2.6)
MCH RBC QN AUTO: 26.5 PG (ref 27–31)
MCHC RBC AUTO-ENTMCNC: 29.4 G/DL (ref 32–36)
MCV RBC AUTO: 90 FL (ref 82–98)
MONOCYTES # BLD AUTO: 1 K/UL (ref 0.3–1)
MONOCYTES NFR BLD: 7.3 % (ref 4–15)
NEUTROPHILS # BLD AUTO: 11.3 K/UL (ref 1.8–7.7)
NEUTROPHILS NFR BLD: 84.7 % (ref 38–73)
NRBC BLD-RTO: 0 /100 WBC
PHOSPHATE SERPL-MCNC: 4.1 MG/DL (ref 2.7–4.5)
PLATELET # BLD AUTO: 195 K/UL (ref 150–350)
PLATELET BLD QL SMEAR: ABNORMAL
PMV BLD AUTO: 10.4 FL (ref 9.2–12.9)
POLYCHROMASIA BLD QL SMEAR: ABNORMAL
POTASSIUM SERPL-SCNC: 4.9 MMOL/L (ref 3.5–5.1)
RBC # BLD AUTO: 3.66 M/UL (ref 4–5.4)
SODIUM SERPL-SCNC: 142 MMOL/L (ref 136–145)
WBC # BLD AUTO: 13.33 K/UL (ref 3.9–12.7)

## 2020-09-11 PROCEDURE — 37000009 HC ANESTHESIA EA ADD 15 MINS: Performed by: UROLOGY

## 2020-09-11 PROCEDURE — 80048 BASIC METABOLIC PNL TOTAL CA: CPT

## 2020-09-11 PROCEDURE — 25000003 PHARM REV CODE 250: Performed by: UROLOGY

## 2020-09-11 PROCEDURE — 63600175 PHARM REV CODE 636 W HCPCS: Performed by: STUDENT IN AN ORGANIZED HEALTH CARE EDUCATION/TRAINING PROGRAM

## 2020-09-11 PROCEDURE — D9220A PRA ANESTHESIA: ICD-10-PCS | Mod: ANES,,, | Performed by: ANESTHESIOLOGY

## 2020-09-11 PROCEDURE — C2617 STENT, NON-COR, TEM W/O DEL: HCPCS | Performed by: UROLOGY

## 2020-09-11 PROCEDURE — 36000708 HC OR TIME LEV III 1ST 15 MIN: Performed by: UROLOGY

## 2020-09-11 PROCEDURE — D9220A PRA ANESTHESIA: Mod: ANES,,, | Performed by: ANESTHESIOLOGY

## 2020-09-11 PROCEDURE — 50815 URINE SHUNT TO INTESTINE: CPT | Mod: 62,50,, | Performed by: COLON & RECTAL SURGERY

## 2020-09-11 PROCEDURE — D9220A PRA ANESTHESIA: Mod: CRNA,,, | Performed by: NURSE ANESTHETIST, CERTIFIED REGISTERED

## 2020-09-11 PROCEDURE — 50815: ICD-10-PCS | Mod: 22,50,62, | Performed by: UROLOGY

## 2020-09-11 PROCEDURE — 63600175 PHARM REV CODE 636 W HCPCS: Performed by: NURSE ANESTHETIST, CERTIFIED REGISTERED

## 2020-09-11 PROCEDURE — 63600175 PHARM REV CODE 636 W HCPCS: Mod: JG | Performed by: STUDENT IN AN ORGANIZED HEALTH CARE EDUCATION/TRAINING PROGRAM

## 2020-09-11 PROCEDURE — 25000003 PHARM REV CODE 250: Performed by: STUDENT IN AN ORGANIZED HEALTH CARE EDUCATION/TRAINING PROGRAM

## 2020-09-11 PROCEDURE — 36000709 HC OR TIME LEV III EA ADD 15 MIN: Performed by: UROLOGY

## 2020-09-11 PROCEDURE — 11000001 HC ACUTE MED/SURG PRIVATE ROOM

## 2020-09-11 PROCEDURE — 50815 URINE SHUNT TO INTESTINE: CPT | Mod: 22,50,62, | Performed by: UROLOGY

## 2020-09-11 PROCEDURE — 25000003 PHARM REV CODE 250: Performed by: NURSE ANESTHETIST, CERTIFIED REGISTERED

## 2020-09-11 PROCEDURE — 50815: ICD-10-PCS | Mod: 62,50,, | Performed by: COLON & RECTAL SURGERY

## 2020-09-11 PROCEDURE — P9045 ALBUMIN (HUMAN), 5%, 250 ML: HCPCS | Mod: JG | Performed by: STUDENT IN AN ORGANIZED HEALTH CARE EDUCATION/TRAINING PROGRAM

## 2020-09-11 PROCEDURE — 63600175 PHARM REV CODE 636 W HCPCS: Performed by: ANESTHESIOLOGY

## 2020-09-11 PROCEDURE — 86901 BLOOD TYPING SEROLOGIC RH(D): CPT

## 2020-09-11 PROCEDURE — 85025 COMPLETE CBC W/AUTO DIFF WBC: CPT

## 2020-09-11 PROCEDURE — 88304 TISSUE EXAM BY PATHOLOGIST: CPT | Performed by: PATHOLOGY

## 2020-09-11 PROCEDURE — 71000015 HC POSTOP RECOV 1ST HR: Performed by: UROLOGY

## 2020-09-11 PROCEDURE — 94761 N-INVAS EAR/PLS OXIMETRY MLT: CPT

## 2020-09-11 PROCEDURE — S0028 INJECTION, FAMOTIDINE, 20 MG: HCPCS | Performed by: STUDENT IN AN ORGANIZED HEALTH CARE EDUCATION/TRAINING PROGRAM

## 2020-09-11 PROCEDURE — 27201423 OPTIME MED/SURG SUP & DEVICES STERILE SUPPLY: Performed by: UROLOGY

## 2020-09-11 PROCEDURE — 71000033 HC RECOVERY, INTIAL HOUR: Performed by: UROLOGY

## 2020-09-11 PROCEDURE — 83735 ASSAY OF MAGNESIUM: CPT

## 2020-09-11 PROCEDURE — 88305 TISSUE EXAM BY PATHOLOGIST: CPT | Mod: 26,,, | Performed by: PATHOLOGY

## 2020-09-11 PROCEDURE — 84100 ASSAY OF PHOSPHORUS: CPT

## 2020-09-11 PROCEDURE — 88305 TISSUE EXAM BY PATHOLOGIST: ICD-10-PCS | Mod: 26,,, | Performed by: PATHOLOGY

## 2020-09-11 PROCEDURE — 27000221 HC OXYGEN, UP TO 24 HOURS

## 2020-09-11 PROCEDURE — D9220A PRA ANESTHESIA: ICD-10-PCS | Mod: CRNA,,, | Performed by: NURSE ANESTHETIST, CERTIFIED REGISTERED

## 2020-09-11 PROCEDURE — 71000016 HC POSTOP RECOV ADDL HR: Performed by: UROLOGY

## 2020-09-11 PROCEDURE — C1729 CATH, DRAINAGE: HCPCS | Performed by: UROLOGY

## 2020-09-11 PROCEDURE — S0028 INJECTION, FAMOTIDINE, 20 MG: HCPCS | Performed by: NURSE ANESTHETIST, CERTIFIED REGISTERED

## 2020-09-11 PROCEDURE — 71000039 HC RECOVERY, EACH ADD'L HOUR: Performed by: UROLOGY

## 2020-09-11 PROCEDURE — 37000008 HC ANESTHESIA 1ST 15 MINUTES: Performed by: UROLOGY

## 2020-09-11 DEVICE — STENT SET URET DIV 7FR 75CM
Type: IMPLANTABLE DEVICE | Site: URETER | Status: NON-FUNCTIONAL
Removed: 2020-09-28

## 2020-09-11 RX ORDER — PHENYLEPHRINE HYDROCHLORIDE 10 MG/ML
INJECTION INTRAVENOUS
Status: DISCONTINUED | OUTPATIENT
Start: 2020-09-11 | End: 2020-09-11

## 2020-09-11 RX ORDER — PREGABALIN 150 MG/1
150 CAPSULE ORAL
Status: COMPLETED | OUTPATIENT
Start: 2020-09-11 | End: 2020-09-11

## 2020-09-11 RX ORDER — MIDAZOLAM HYDROCHLORIDE 1 MG/ML
INJECTION, SOLUTION INTRAMUSCULAR; INTRAVENOUS
Status: DISCONTINUED | OUTPATIENT
Start: 2020-09-11 | End: 2020-09-11

## 2020-09-11 RX ORDER — CEFAZOLIN SODIUM 1 G/3ML
2 INJECTION, POWDER, FOR SOLUTION INTRAMUSCULAR; INTRAVENOUS
Status: COMPLETED | OUTPATIENT
Start: 2020-09-11 | End: 2020-09-11

## 2020-09-11 RX ORDER — FENTANYL CITRATE 50 UG/ML
INJECTION, SOLUTION INTRAMUSCULAR; INTRAVENOUS
Status: DISCONTINUED | OUTPATIENT
Start: 2020-09-11 | End: 2020-09-11

## 2020-09-11 RX ORDER — LIDOCAINE HCL/PF 100 MG/5ML
SYRINGE (ML) INTRAVENOUS
Status: DISCONTINUED | OUTPATIENT
Start: 2020-09-11 | End: 2020-09-11

## 2020-09-11 RX ORDER — FAMOTIDINE 10 MG/ML
20 INJECTION INTRAVENOUS 2 TIMES DAILY
Status: DISCONTINUED | OUTPATIENT
Start: 2020-09-11 | End: 2020-09-13

## 2020-09-11 RX ORDER — HYDROMORPHONE HYDROCHLORIDE 2 MG/ML
INJECTION, SOLUTION INTRAMUSCULAR; INTRAVENOUS; SUBCUTANEOUS
Status: DISCONTINUED | OUTPATIENT
Start: 2020-09-11 | End: 2020-09-11

## 2020-09-11 RX ORDER — KETAMINE HCL IN 0.9 % NACL 50 MG/5 ML
SYRINGE (ML) INTRAVENOUS
Status: DISCONTINUED | OUTPATIENT
Start: 2020-09-11 | End: 2020-09-11

## 2020-09-11 RX ORDER — ALBUMIN HUMAN 50 G/1000ML
SOLUTION INTRAVENOUS CONTINUOUS PRN
Status: DISCONTINUED | OUTPATIENT
Start: 2020-09-11 | End: 2020-09-11

## 2020-09-11 RX ORDER — HEPARIN SODIUM 5000 [USP'U]/ML
5000 INJECTION, SOLUTION INTRAVENOUS; SUBCUTANEOUS EVERY 8 HOURS
Status: DISCONTINUED | OUTPATIENT
Start: 2020-09-11 | End: 2020-09-12

## 2020-09-11 RX ORDER — OXYCODONE HYDROCHLORIDE 10 MG/1
10 TABLET ORAL EVERY 4 HOURS PRN
Status: DISCONTINUED | OUTPATIENT
Start: 2020-09-11 | End: 2020-09-18

## 2020-09-11 RX ORDER — ROCURONIUM BROMIDE 10 MG/ML
INJECTION, SOLUTION INTRAVENOUS
Status: DISCONTINUED | OUTPATIENT
Start: 2020-09-11 | End: 2020-09-11

## 2020-09-11 RX ORDER — ONDANSETRON 2 MG/ML
INJECTION INTRAMUSCULAR; INTRAVENOUS
Status: DISCONTINUED | OUTPATIENT
Start: 2020-09-11 | End: 2020-09-11

## 2020-09-11 RX ORDER — HEPARIN SODIUM 5000 [USP'U]/ML
5000 INJECTION, SOLUTION INTRAVENOUS; SUBCUTANEOUS ONCE
Status: COMPLETED | OUTPATIENT
Start: 2020-09-11 | End: 2020-09-11

## 2020-09-11 RX ORDER — DEXAMETHASONE SODIUM PHOSPHATE 4 MG/ML
INJECTION, SOLUTION INTRA-ARTICULAR; INTRALESIONAL; INTRAMUSCULAR; INTRAVENOUS; SOFT TISSUE
Status: DISCONTINUED | OUTPATIENT
Start: 2020-09-11 | End: 2020-09-11

## 2020-09-11 RX ORDER — ACETAMINOPHEN 10 MG/ML
INJECTION, SOLUTION INTRAVENOUS
Status: DISCONTINUED | OUTPATIENT
Start: 2020-09-11 | End: 2020-09-11

## 2020-09-11 RX ORDER — ACETAMINOPHEN 500 MG
1000 TABLET ORAL EVERY 6 HOURS
Status: DISCONTINUED | OUTPATIENT
Start: 2020-09-12 | End: 2020-09-18

## 2020-09-11 RX ORDER — ACETAMINOPHEN 500 MG
1000 TABLET ORAL
Status: DISCONTINUED | OUTPATIENT
Start: 2020-09-11 | End: 2020-09-11

## 2020-09-11 RX ORDER — SODIUM CHLORIDE 0.9 % (FLUSH) 0.9 %
10 SYRINGE (ML) INJECTION
Status: DISCONTINUED | OUTPATIENT
Start: 2020-09-11 | End: 2020-10-13 | Stop reason: HOSPADM

## 2020-09-11 RX ORDER — OXYCODONE HYDROCHLORIDE 5 MG/1
5 TABLET ORAL EVERY 4 HOURS PRN
Status: DISCONTINUED | OUTPATIENT
Start: 2020-09-11 | End: 2020-09-18

## 2020-09-11 RX ORDER — SODIUM CHLORIDE 9 MG/ML
INJECTION, SOLUTION INTRAVENOUS CONTINUOUS
Status: DISCONTINUED | OUTPATIENT
Start: 2020-09-11 | End: 2020-09-13

## 2020-09-11 RX ORDER — SODIUM CHLORIDE 0.9 % (FLUSH) 0.9 %
3 SYRINGE (ML) INJECTION
Status: DISCONTINUED | OUTPATIENT
Start: 2020-09-11 | End: 2020-10-13 | Stop reason: HOSPADM

## 2020-09-11 RX ORDER — ONDANSETRON 2 MG/ML
4 INJECTION INTRAMUSCULAR; INTRAVENOUS DAILY PRN
Status: DISCONTINUED | OUTPATIENT
Start: 2020-09-11 | End: 2020-09-11 | Stop reason: HOSPADM

## 2020-09-11 RX ORDER — KETOROLAC TROMETHAMINE 30 MG/ML
15 INJECTION, SOLUTION INTRAMUSCULAR; INTRAVENOUS EVERY 6 HOURS
Status: COMPLETED | OUTPATIENT
Start: 2020-09-12 | End: 2020-09-14

## 2020-09-11 RX ORDER — PROPOFOL 10 MG/ML
INJECTION, EMULSION INTRAVENOUS
Status: DISCONTINUED | OUTPATIENT
Start: 2020-09-11 | End: 2020-09-11

## 2020-09-11 RX ORDER — HYDROMORPHONE HYDROCHLORIDE 1 MG/ML
0.2 INJECTION, SOLUTION INTRAMUSCULAR; INTRAVENOUS; SUBCUTANEOUS EVERY 5 MIN PRN
Status: DISCONTINUED | OUTPATIENT
Start: 2020-09-11 | End: 2020-09-11 | Stop reason: HOSPADM

## 2020-09-11 RX ORDER — FAMOTIDINE 10 MG/ML
INJECTION INTRAVENOUS
Status: DISCONTINUED | OUTPATIENT
Start: 2020-09-11 | End: 2020-09-11

## 2020-09-11 RX ORDER — BUPIVACAINE HYDROCHLORIDE 2.5 MG/ML
INJECTION, SOLUTION EPIDURAL; INFILTRATION; INTRACAUDAL
Status: DISCONTINUED | OUTPATIENT
Start: 2020-09-11 | End: 2020-09-11 | Stop reason: HOSPADM

## 2020-09-11 RX ORDER — METHOCARBAMOL 500 MG/1
500 TABLET, FILM COATED ORAL EVERY 6 HOURS PRN
Status: DISCONTINUED | OUTPATIENT
Start: 2020-09-11 | End: 2020-09-18

## 2020-09-11 RX ORDER — HYDROMORPHONE HYDROCHLORIDE 1 MG/ML
0.5 INJECTION, SOLUTION INTRAMUSCULAR; INTRAVENOUS; SUBCUTANEOUS EVERY 6 HOURS PRN
Status: DISCONTINUED | OUTPATIENT
Start: 2020-09-11 | End: 2020-09-18

## 2020-09-11 RX ORDER — CEFAZOLIN SODIUM 1 G/3ML
2 INJECTION, POWDER, FOR SOLUTION INTRAMUSCULAR; INTRAVENOUS
Status: COMPLETED | OUTPATIENT
Start: 2020-09-12 | End: 2020-09-12

## 2020-09-11 RX ORDER — SODIUM CHLORIDE 9 MG/ML
INJECTION, SOLUTION INTRAVENOUS CONTINUOUS
Status: DISCONTINUED | OUTPATIENT
Start: 2020-09-11 | End: 2020-09-11

## 2020-09-11 RX ORDER — PREGABALIN 50 MG/1
150 CAPSULE ORAL NIGHTLY
Status: DISCONTINUED | OUTPATIENT
Start: 2020-09-11 | End: 2020-09-18

## 2020-09-11 RX ORDER — KETOROLAC TROMETHAMINE 30 MG/ML
15 INJECTION, SOLUTION INTRAMUSCULAR; INTRAVENOUS ONCE
Status: COMPLETED | OUTPATIENT
Start: 2020-09-11 | End: 2020-09-11

## 2020-09-11 RX ADMIN — CEFAZOLIN 2 G: 330 INJECTION, POWDER, FOR SOLUTION INTRAMUSCULAR; INTRAVENOUS at 07:09

## 2020-09-11 RX ADMIN — ONDANSETRON 4 MG: 2 INJECTION, SOLUTION INTRAMUSCULAR; INTRAVENOUS at 07:09

## 2020-09-11 RX ADMIN — CEFAZOLIN 2 G: 330 INJECTION, POWDER, FOR SOLUTION INTRAMUSCULAR; INTRAVENOUS at 11:09

## 2020-09-11 RX ADMIN — SODIUM CHLORIDE, SODIUM GLUCONATE, SODIUM ACETATE, POTASSIUM CHLORIDE, MAGNESIUM CHLORIDE, SODIUM PHOSPHATE, DIBASIC, AND POTASSIUM PHOSPHATE: .53; .5; .37; .037; .03; .012; .00082 INJECTION, SOLUTION INTRAVENOUS at 04:09

## 2020-09-11 RX ADMIN — FAMOTIDINE 20 MG: 10 INJECTION, SOLUTION INTRAVENOUS at 08:09

## 2020-09-11 RX ADMIN — DEXAMETHASONE SODIUM PHOSPHATE 4 MG: 4 INJECTION, SOLUTION INTRAMUSCULAR; INTRAVENOUS at 07:09

## 2020-09-11 RX ADMIN — SUGAMMADEX 200 MG: 100 INJECTION, SOLUTION INTRAVENOUS at 07:09

## 2020-09-11 RX ADMIN — ROCURONIUM BROMIDE 10 MG: 10 INJECTION, SOLUTION INTRAVENOUS at 01:09

## 2020-09-11 RX ADMIN — ACETAMINOPHEN 1000 MG: 10 INJECTION, SOLUTION INTRAVENOUS at 04:09

## 2020-09-11 RX ADMIN — HYDROMORPHONE HYDROCHLORIDE 0.3 MG: 2 INJECTION, SOLUTION INTRAMUSCULAR; INTRAVENOUS; SUBCUTANEOUS at 07:09

## 2020-09-11 RX ADMIN — ROCURONIUM BROMIDE 10 MG: 10 INJECTION, SOLUTION INTRAVENOUS at 05:09

## 2020-09-11 RX ADMIN — CALCIUM GLUCONATE 1000 MG: 98 INJECTION, SOLUTION INTRAVENOUS at 11:09

## 2020-09-11 RX ADMIN — PREGABALIN 150 MG: 50 CAPSULE ORAL at 08:09

## 2020-09-11 RX ADMIN — SODIUM CHLORIDE, SODIUM GLUCONATE, SODIUM ACETATE, POTASSIUM CHLORIDE, MAGNESIUM CHLORIDE, SODIUM PHOSPHATE, DIBASIC, AND POTASSIUM PHOSPHATE: .53; .5; .37; .037; .03; .012; .00082 INJECTION, SOLUTION INTRAVENOUS at 12:09

## 2020-09-11 RX ADMIN — FENTANYL CITRATE 25 MCG: 50 INJECTION, SOLUTION INTRAMUSCULAR; INTRAVENOUS at 11:09

## 2020-09-11 RX ADMIN — HYDROMORPHONE HYDROCHLORIDE 0.1 MG: 2 INJECTION, SOLUTION INTRAMUSCULAR; INTRAVENOUS; SUBCUTANEOUS at 06:09

## 2020-09-11 RX ADMIN — ROCURONIUM BROMIDE 10 MG: 10 INJECTION, SOLUTION INTRAVENOUS at 12:09

## 2020-09-11 RX ADMIN — ACETAMINOPHEN 1000 MG: 500 TABLET ORAL at 05:09

## 2020-09-11 RX ADMIN — PHENYLEPHRINE HYDROCHLORIDE 100 MCG: 10 INJECTION INTRAVENOUS at 04:09

## 2020-09-11 RX ADMIN — FAMOTIDINE 20 MG: 10 INJECTION, SOLUTION INTRAVENOUS at 07:09

## 2020-09-11 RX ADMIN — SODIUM CHLORIDE, SODIUM GLUCONATE, SODIUM ACETATE, POTASSIUM CHLORIDE, MAGNESIUM CHLORIDE, SODIUM PHOSPHATE, DIBASIC, AND POTASSIUM PHOSPHATE: .53; .5; .37; .037; .03; .012; .00082 INJECTION, SOLUTION INTRAVENOUS at 07:09

## 2020-09-11 RX ADMIN — HEPARIN SODIUM 5000 UNITS: 5000 INJECTION, SOLUTION INTRAVENOUS; SUBCUTANEOUS at 07:09

## 2020-09-11 RX ADMIN — PREGABALIN 150 MG: 150 CAPSULE ORAL at 06:09

## 2020-09-11 RX ADMIN — ROCURONIUM BROMIDE 20 MG: 10 INJECTION, SOLUTION INTRAVENOUS at 10:09

## 2020-09-11 RX ADMIN — ROCURONIUM BROMIDE 10 MG: 10 INJECTION, SOLUTION INTRAVENOUS at 09:09

## 2020-09-11 RX ADMIN — ONDANSETRON 4 MG: 2 INJECTION, SOLUTION INTRAMUSCULAR; INTRAVENOUS at 05:09

## 2020-09-11 RX ADMIN — PHENYLEPHRINE HYDROCHLORIDE 100 MCG: 10 INJECTION INTRAVENOUS at 05:09

## 2020-09-11 RX ADMIN — CEFAZOLIN 2 G: 330 INJECTION, POWDER, FOR SOLUTION INTRAMUSCULAR; INTRAVENOUS at 03:09

## 2020-09-11 RX ADMIN — HYDROMORPHONE HYDROCHLORIDE 0.2 MG: 1 INJECTION, SOLUTION INTRAMUSCULAR; INTRAVENOUS; SUBCUTANEOUS at 08:09

## 2020-09-11 RX ADMIN — Medication 10 MG: at 09:09

## 2020-09-11 RX ADMIN — PHENYLEPHRINE HYDROCHLORIDE 100 MCG: 10 INJECTION INTRAVENOUS at 01:09

## 2020-09-11 RX ADMIN — HYDROMORPHONE HYDROCHLORIDE 0.5 MG: 2 INJECTION, SOLUTION INTRAMUSCULAR; INTRAVENOUS; SUBCUTANEOUS at 01:09

## 2020-09-11 RX ADMIN — FENTANYL CITRATE 100 MCG: 50 INJECTION, SOLUTION INTRAMUSCULAR; INTRAVENOUS at 07:09

## 2020-09-11 RX ADMIN — HEPARIN SODIUM 5000 UNITS: 5000 INJECTION INTRAVENOUS; SUBCUTANEOUS at 09:09

## 2020-09-11 RX ADMIN — SODIUM CHLORIDE, SODIUM GLUCONATE, SODIUM ACETATE, POTASSIUM CHLORIDE, MAGNESIUM CHLORIDE, SODIUM PHOSPHATE, DIBASIC, AND POTASSIUM PHOSPHATE: .53; .5; .37; .037; .03; .012; .00082 INJECTION, SOLUTION INTRAVENOUS at 02:09

## 2020-09-11 RX ADMIN — ROCURONIUM BROMIDE 10 MG: 10 INJECTION, SOLUTION INTRAVENOUS at 08:09

## 2020-09-11 RX ADMIN — ROCURONIUM BROMIDE 10 MG: 10 INJECTION, SOLUTION INTRAVENOUS at 04:09

## 2020-09-11 RX ADMIN — KETOROLAC TROMETHAMINE 15 MG: 30 INJECTION, SOLUTION INTRAMUSCULAR at 05:09

## 2020-09-11 RX ADMIN — FENTANYL CITRATE 25 MCG: 50 INJECTION, SOLUTION INTRAMUSCULAR; INTRAVENOUS at 09:09

## 2020-09-11 RX ADMIN — LIDOCAINE HYDROCHLORIDE 100 MG: 20 INJECTION, SOLUTION INTRAVENOUS at 07:09

## 2020-09-11 RX ADMIN — CALCIUM GLUCONATE 1000 MG: 98 INJECTION, SOLUTION INTRAVENOUS at 09:09

## 2020-09-11 RX ADMIN — ALBUMIN (HUMAN): 12.5 SOLUTION INTRAVENOUS at 04:09

## 2020-09-11 RX ADMIN — ROCURONIUM BROMIDE 10 MG: 10 INJECTION, SOLUTION INTRAVENOUS at 03:09

## 2020-09-11 RX ADMIN — HYDROMORPHONE HYDROCHLORIDE 0.2 MG: 1 INJECTION, SOLUTION INTRAMUSCULAR; INTRAVENOUS; SUBCUTANEOUS at 07:09

## 2020-09-11 RX ADMIN — ROCURONIUM BROMIDE 50 MG: 10 INJECTION, SOLUTION INTRAVENOUS at 07:09

## 2020-09-11 RX ADMIN — OXYCODONE 5 MG: 5 TABLET ORAL at 08:09

## 2020-09-11 RX ADMIN — FENTANYL CITRATE 25 MCG: 50 INJECTION, SOLUTION INTRAMUSCULAR; INTRAVENOUS at 08:09

## 2020-09-11 RX ADMIN — SODIUM CHLORIDE, SODIUM GLUCONATE, SODIUM ACETATE, POTASSIUM CHLORIDE, MAGNESIUM CHLORIDE, SODIUM PHOSPHATE, DIBASIC, AND POTASSIUM PHOSPHATE: .53; .5; .37; .037; .03; .012; .00082 INJECTION, SOLUTION INTRAVENOUS at 09:09

## 2020-09-11 RX ADMIN — Medication 10 MG: at 10:09

## 2020-09-11 RX ADMIN — FENTANYL CITRATE 25 MCG: 50 INJECTION, SOLUTION INTRAMUSCULAR; INTRAVENOUS at 07:09

## 2020-09-11 RX ADMIN — FENTANYL CITRATE 25 MCG: 50 INJECTION, SOLUTION INTRAMUSCULAR; INTRAVENOUS at 03:09

## 2020-09-11 RX ADMIN — MIDAZOLAM HYDROCHLORIDE 2 MG: 1 INJECTION, SOLUTION INTRAMUSCULAR; INTRAVENOUS at 06:09

## 2020-09-11 RX ADMIN — Medication 20 MG: at 07:09

## 2020-09-11 RX ADMIN — PROPOFOL 200 MG: 10 INJECTION, EMULSION INTRAVENOUS at 07:09

## 2020-09-11 RX ADMIN — ROCURONIUM BROMIDE 10 MG: 10 INJECTION, SOLUTION INTRAVENOUS at 02:09

## 2020-09-11 RX ADMIN — FENTANYL CITRATE 25 MCG: 50 INJECTION, SOLUTION INTRAMUSCULAR; INTRAVENOUS at 04:09

## 2020-09-11 RX ADMIN — Medication 10 MG: at 08:09

## 2020-09-11 RX ADMIN — FENTANYL CITRATE 50 MCG: 50 INJECTION, SOLUTION INTRAMUSCULAR; INTRAVENOUS at 07:09

## 2020-09-11 RX ADMIN — SODIUM CHLORIDE: 0.9 INJECTION, SOLUTION INTRAVENOUS at 08:09

## 2020-09-11 RX ADMIN — SODIUM CHLORIDE: 0.9 INJECTION, SOLUTION INTRAVENOUS at 05:09

## 2020-09-11 NOTE — INTERVAL H&P NOTE
The patient has been examined and the H&P has been reviewed:    I concur with the findings and no changes have occurred since H&P was written.    Surgery risks, benefits and alternative options discussed and understood by patient/family.    Catheterized UA negative       Active Hospital Problems    Diagnosis  POA    Bilateral ureteral obstruction [N13.5]  Yes      Resolved Hospital Problems   No resolved problems to display.     Patient seen in holding.  No changes in clinical condition.  Proceed with planned procedure.

## 2020-09-11 NOTE — ANESTHESIA PROCEDURE NOTES
Intubation  Performed by: Jacqui Munguia CRNA  Authorized by: Royce Reynaga Jr., MD     Intubation:     Induction:  Intravenous    Intubated:  Postinduction    Mask Ventilation:  Easy mask    Attempts:  1    Attempted By:  CRNA    Method of Intubation:  Direct    Blade:  Islas 2    Laryngeal View Grade: Grade IIA - cords partially seen      Difficult Airway Encountered?: No      Complications:  None    Airway Device:  Oral endotracheal tube    Airway Device Size:  7.0    Style/Cuff Inflation:  Cuffed (inflated to minimal occlusive pressure)    Tube secured:  22    Secured at:  The lips    Placement Verified By:  Capnometry    Complicating Factors:  None    Findings Post-Intubation:  BS equal bilateral

## 2020-09-11 NOTE — ANESTHESIA PREPROCEDURE EVALUATION
09/11/2020  Edita Riley is a 55 y.o., female.    Anesthesia Evaluation    I have reviewed the Patient Summary Reports.    I have reviewed the Nursing Notes.       Review of Systems  Anesthesia Hx:  No problems with previous Anesthesia    Hematology/Oncology:  Hematology Normal   Oncology Normal     EENT/Dental:EENT/Dental Normal   Cardiovascular:   Hypertension    Pulmonary:  Pulmonary Normal    Renal/:   Chronic Renal Disease    Hepatic/GI:   Hiatal Hernia, GERD    Musculoskeletal:   Arthritis     Neurological:   CVA Neuromuscular Disease,    Endocrine:  Endocrine Normal    Dermatological:  Skin Normal    Psych:   Psychiatric History          Physical Exam  General:  Well nourished    Airway/Jaw/Neck:  Airway Findings: Mouth Opening: Normal Tongue: Normal  General Airway Assessment: Adult  Mallampati: II  TM Distance: Normal, at least 6 cm        Eyes/Ears/Nose:  EYES/EARS/NOSE FINDINGS: Normal   Dental:  Dental Findings: In tact   Chest/Lungs:  Chest/Lungs Clear    Heart/Vascular:  Heart Findings: Normal Heart murmur: negative Vascular Findings: Normal    Abdomen:  Abdomen Findings: Normal    Musculoskeletal:  Musculoskeletal Findings: Normal   Skin:  Skin Findings: Normal    Mental Status:  Mental Status Findings: Normal        Anesthesia Plan  Type of Anesthesia, risks & benefits discussed:  Anesthesia Type:  general  Patient's Preference:   Intra-op Monitoring Plan:   Intra-op Monitoring Plan Comments:   Post Op Pain Control Plan:   Post Op Pain Control Plan Comments:   Induction:   IV  Beta Blocker:  Patient is not currently on a Beta-Blocker (No further documentation required).       Informed Consent: Patient understands risks and agrees with Anesthesia plan.  Questions answered. Anesthesia consent signed with patient.  ASA Score: 3     Day of Surgery Review of History & Physical:     H&P update referred to the surgeon.         Ready For Surgery From Anesthesia Perspective.

## 2020-09-12 LAB
ANION GAP SERPL CALC-SCNC: 9 MMOL/L (ref 8–16)
ANISOCYTOSIS BLD QL SMEAR: SLIGHT
BASOPHILS # BLD AUTO: 0.01 K/UL (ref 0–0.2)
BASOPHILS NFR BLD: 0.1 % (ref 0–1.9)
BUN SERPL-MCNC: 12 MG/DL (ref 6–20)
BURR CELLS BLD QL SMEAR: ABNORMAL
CALCIUM SERPL-MCNC: 8.3 MG/DL (ref 8.7–10.5)
CHLORIDE SERPL-SCNC: 110 MMOL/L (ref 95–110)
CO2 SERPL-SCNC: 23 MMOL/L (ref 23–29)
CREAT SERPL-MCNC: 1.2 MG/DL (ref 0.5–1.4)
DIFFERENTIAL METHOD: ABNORMAL
EOSINOPHIL # BLD AUTO: 0 K/UL (ref 0–0.5)
EOSINOPHIL NFR BLD: 0 % (ref 0–8)
ERYTHROCYTE [DISTWIDTH] IN BLOOD BY AUTOMATED COUNT: 15.9 % (ref 11.5–14.5)
EST. GFR  (AFRICAN AMERICAN): 58 ML/MIN/1.73 M^2
EST. GFR  (NON AFRICAN AMERICAN): 50.3 ML/MIN/1.73 M^2
GLUCOSE SERPL-MCNC: 130 MG/DL (ref 70–110)
HCT VFR BLD AUTO: 31.7 % (ref 37–48.5)
HGB BLD-MCNC: 9.4 G/DL (ref 12–16)
HYPOCHROMIA BLD QL SMEAR: ABNORMAL
IMM GRANULOCYTES # BLD AUTO: 0.03 K/UL (ref 0–0.04)
IMM GRANULOCYTES NFR BLD AUTO: 0.3 % (ref 0–0.5)
LYMPHOCYTES # BLD AUTO: 1.1 K/UL (ref 1–4.8)
LYMPHOCYTES NFR BLD: 9.3 % (ref 18–48)
MAGNESIUM SERPL-MCNC: 1.9 MG/DL (ref 1.6–2.6)
MCH RBC QN AUTO: 26.7 PG (ref 27–31)
MCHC RBC AUTO-ENTMCNC: 29.7 G/DL (ref 32–36)
MCV RBC AUTO: 90 FL (ref 82–98)
MONOCYTES # BLD AUTO: 0.9 K/UL (ref 0.3–1)
MONOCYTES NFR BLD: 8 % (ref 4–15)
NEUTROPHILS # BLD AUTO: 9.5 K/UL (ref 1.8–7.7)
NEUTROPHILS NFR BLD: 82.3 % (ref 38–73)
NRBC BLD-RTO: 0 /100 WBC
PHOSPHATE SERPL-MCNC: 3.7 MG/DL (ref 2.7–4.5)
PLATELET # BLD AUTO: 207 K/UL (ref 150–350)
PLATELET BLD QL SMEAR: ABNORMAL
PMV BLD AUTO: 11 FL (ref 9.2–12.9)
POIKILOCYTOSIS BLD QL SMEAR: SLIGHT
POLYCHROMASIA BLD QL SMEAR: ABNORMAL
POTASSIUM SERPL-SCNC: 4.3 MMOL/L (ref 3.5–5.1)
RBC # BLD AUTO: 3.52 M/UL (ref 4–5.4)
SODIUM SERPL-SCNC: 142 MMOL/L (ref 136–145)
WBC # BLD AUTO: 11.56 K/UL (ref 3.9–12.7)

## 2020-09-12 PROCEDURE — 36415 COLL VENOUS BLD VENIPUNCTURE: CPT

## 2020-09-12 PROCEDURE — 84100 ASSAY OF PHOSPHORUS: CPT

## 2020-09-12 PROCEDURE — 85025 COMPLETE CBC W/AUTO DIFF WBC: CPT

## 2020-09-12 PROCEDURE — 97165 OT EVAL LOW COMPLEX 30 MIN: CPT

## 2020-09-12 PROCEDURE — S0028 INJECTION, FAMOTIDINE, 20 MG: HCPCS | Performed by: STUDENT IN AN ORGANIZED HEALTH CARE EDUCATION/TRAINING PROGRAM

## 2020-09-12 PROCEDURE — 25000003 PHARM REV CODE 250: Performed by: STUDENT IN AN ORGANIZED HEALTH CARE EDUCATION/TRAINING PROGRAM

## 2020-09-12 PROCEDURE — 97530 THERAPEUTIC ACTIVITIES: CPT

## 2020-09-12 PROCEDURE — 94799 UNLISTED PULMONARY SVC/PX: CPT

## 2020-09-12 PROCEDURE — 97162 PT EVAL MOD COMPLEX 30 MIN: CPT

## 2020-09-12 PROCEDURE — 97535 SELF CARE MNGMENT TRAINING: CPT

## 2020-09-12 PROCEDURE — 63600175 PHARM REV CODE 636 W HCPCS: Performed by: STUDENT IN AN ORGANIZED HEALTH CARE EDUCATION/TRAINING PROGRAM

## 2020-09-12 PROCEDURE — 94761 N-INVAS EAR/PLS OXIMETRY MLT: CPT

## 2020-09-12 PROCEDURE — 83735 ASSAY OF MAGNESIUM: CPT

## 2020-09-12 PROCEDURE — 80048 BASIC METABOLIC PNL TOTAL CA: CPT

## 2020-09-12 PROCEDURE — 11000001 HC ACUTE MED/SURG PRIVATE ROOM

## 2020-09-12 RX ORDER — ENOXAPARIN SODIUM 100 MG/ML
40 INJECTION SUBCUTANEOUS EVERY 24 HOURS
Status: DISCONTINUED | OUTPATIENT
Start: 2020-09-12 | End: 2020-09-13

## 2020-09-12 RX ADMIN — FAMOTIDINE 20 MG: 10 INJECTION, SOLUTION INTRAVENOUS at 09:09

## 2020-09-12 RX ADMIN — CEFAZOLIN 2 G: 1 INJECTION, POWDER, FOR SOLUTION INTRAMUSCULAR; INTRAVENOUS at 03:09

## 2020-09-12 RX ADMIN — ACETAMINOPHEN 1000 MG: 325 TABLET ORAL at 12:09

## 2020-09-12 RX ADMIN — ACETAMINOPHEN 1000 MG: 325 TABLET ORAL at 06:09

## 2020-09-12 RX ADMIN — KETOROLAC TROMETHAMINE 15 MG: 30 INJECTION, SOLUTION INTRAMUSCULAR at 06:09

## 2020-09-12 RX ADMIN — ACETAMINOPHEN 1000 MG: 325 TABLET ORAL at 01:09

## 2020-09-12 RX ADMIN — SODIUM CHLORIDE 500 ML: 0.9 INJECTION, SOLUTION INTRAVENOUS at 10:09

## 2020-09-12 RX ADMIN — KETOROLAC TROMETHAMINE 15 MG: 30 INJECTION, SOLUTION INTRAMUSCULAR at 01:09

## 2020-09-12 RX ADMIN — ENOXAPARIN SODIUM 40 MG: 40 INJECTION SUBCUTANEOUS at 04:09

## 2020-09-12 RX ADMIN — PREGABALIN 150 MG: 50 CAPSULE ORAL at 09:09

## 2020-09-12 RX ADMIN — KETOROLAC TROMETHAMINE 15 MG: 30 INJECTION, SOLUTION INTRAMUSCULAR at 12:09

## 2020-09-12 RX ADMIN — CEFAZOLIN 2 G: 1 INJECTION, POWDER, FOR SOLUTION INTRAMUSCULAR; INTRAVENOUS at 10:09

## 2020-09-12 RX ADMIN — FAMOTIDINE 20 MG: 10 INJECTION, SOLUTION INTRAVENOUS at 08:09

## 2020-09-12 RX ADMIN — HEPARIN SODIUM 5000 UNITS: 5000 INJECTION INTRAVENOUS; SUBCUTANEOUS at 06:09

## 2020-09-12 RX ADMIN — SODIUM CHLORIDE: 0.9 INJECTION, SOLUTION INTRAVENOUS at 04:09

## 2020-09-12 NOTE — ANESTHESIA POSTPROCEDURE EVALUATION
Anesthesia Post Evaluation    [facility]    Procedure(s) Performed: Procedure(s) (LRB):  MOBILIZATION, COLONIC (N/A)  CREATION,CONDUIT,TRANSVERSE COLON (N/A)    Final Anesthesia Type: general    Patient location during evaluation: PACU  Patient participation: Yes- Able to Participate  Level of consciousness: awake and alert  Post-procedure vital signs: reviewed and stable  Pain management: adequate  Airway patency: patent    PONV status at discharge: No PONV  Anesthetic complications: no      Cardiovascular status: blood pressure returned to baseline and stable  Respiratory status: unassisted  Hydration status: euvolemic  Follow-up not needed.          Vitals Value Taken Time   /74 09/12/20 1520   Temp 36.7 °C (98 °F) 09/12/20 1520   Pulse 90 09/12/20 1520   Resp 18 09/12/20 1520   SpO2 96 % 09/12/20 1520         Event Time   Out of Recovery 21:00:00         Pain/Caitie Score: Pain Rating Prior to Med Admin: 9 (9/12/2020  6:16 PM)  Caitie Score: 9 (9/11/2020  9:41 PM)

## 2020-09-12 NOTE — NURSING TRANSFER
Nursing Transfer Note      9/11/2020     Transfer To: 524 from PACU    Transfer via bed    Transfer with IV pole, personal belongings bag with shoes and clothes    Transported by PCT and Transport    Medicines sent: NS infusing, CA infusing    Chart send with patient: Yes    Notified: spouse, updated patient's  and patient was able to speak to him over the phone    Patient reassessed at: 9/11/2020 21:41

## 2020-09-12 NOTE — SUBJECTIVE & OBJECTIVE
Subjective:     Interval History:   No acute events overnight.   Doing well, pain well controlled.   Tolerating diet, no nausea/vomiting.   Passing flatus.     Post-Op Info:  Procedure(s) (LRB):  MOBILIZATION, COLONIC (N/A)  CREATION,CONDUIT,TRANSVERSE COLON (N/A)   1 Day Post-Op      Medications:  Continuous Infusions:   sodium chloride 0.9% 125 mL/hr at 09/11/20 2044     Scheduled Meds:   acetaminophen  1,000 mg Oral Q6H    ceFAZolin (ANCEF) IVPB  2 g Intravenous Q8H    famotidine (PF)  20 mg Intravenous BID    heparin (porcine)  5,000 Units Subcutaneous Q8H    ketorolac  15 mg Intravenous Q6H    pregabalin  150 mg Oral QHS     PRN Meds:   HYDROmorphone    methocarbamoL    oxyCODONE    oxyCODONE    sodium chloride 0.9%    sodium chloride 0.9%        Objective:     Vital Signs (Most Recent):  Temp: 98 °F (36.7 °C) (09/12/20 0340)  Pulse: 73 (09/12/20 0340)  Resp: 18 (09/12/20 0340)  BP: (!) 122/58 (09/12/20 0340)  SpO2: 96 % (09/12/20 0340) Vital Signs (24h Range):  Temp:  [96.4 °F (35.8 °C)-98.2 °F (36.8 °C)] 98 °F (36.7 °C)  Pulse:  [64-90] 73  Resp:  [7-21] 18  SpO2:  [96 %-100 %] 96 %  BP: (103-131)/(54-74) 122/58     Intake/Output - Last 3 Shifts       09/10 0700 - 09/11 0659 09/11 0700 - 09/12 0659 09/12 0700 - 09/13 0659    P.O.  120     I.V. (mL/kg)  6195.8 (69.7)     IV Piggyback  200     Total Intake(mL/kg)  6515.8 (73.3)     Urine (mL/kg/hr)  1450 (0.7)     Drains  120     Blood  300     Total Output  1870     Net  +4645.8                  Physical Exam  Constitutional:       General: She is not in acute distress.  HENT:      Head: Normocephalic and atraumatic.      Right Ear: External ear normal.      Left Ear: External ear normal.      Nose: Nose normal.      Mouth/Throat:      Mouth: Mucous membranes are moist.   Eyes:      Extraocular Movements: Extraocular movements intact.      Conjunctiva/sclera: Conjunctivae normal.      Pupils: Pupils are equal, round, and reactive to light.    Neck:      Musculoskeletal: Normal range of motion.   Cardiovascular:      Rate and Rhythm: Normal rate and regular rhythm.      Pulses: Normal pulses.      Heart sounds: Normal heart sounds.   Pulmonary:      Effort: Pulmonary effort is normal.      Breath sounds: Normal breath sounds.   Abdominal:      General: There is no distension.      Palpations: Abdomen is soft.      Comments: Appropriate tenderness   Musculoskeletal: Normal range of motion.   Skin:     General: Skin is warm and dry.   Neurological:      General: No focal deficit present.      Mental Status: She is alert and oriented to person, place, and time.   Psychiatric:         Mood and Affect: Mood normal.         Behavior: Behavior normal.         Significant Labs:  BMP (Last 3 Results):   Recent Labs   Lab 09/11/20 1956 09/12/20  0400   * 130*    142   K 4.9 4.3   * 110   CO2 15* 23   BUN 10 12   CREATININE 1.1 1.2   CALCIUM 6.9* 8.3*   MG 2.1 1.9     CBC (Last 3 Results):   Recent Labs   Lab 09/11/20 1956 09/12/20  0400   WBC 13.33* 11.56   RBC 3.66* 3.52*   HGB 9.7* 9.4*   HCT 33.0* 31.7*    207   MCV 90 90   MCH 26.5* 26.7*   MCHC 29.4* 29.7*

## 2020-09-12 NOTE — ASSESSMENT & PLAN NOTE
Ms. Riley is a 55 y.o. female with b/l ureteral obstruction s/p transverse colon conduit on 9/11/2020.     - Okay to advance to low res diet  - Rest of care per primary  - Will continue to follow

## 2020-09-12 NOTE — SUBJECTIVE & OBJECTIVE
Interval History:   Doing well this morning. No issues overnight.   She did try to get out of bed at 2am and was a little unsteady.   Tolerating clear liquids. Pain well controlled. Would like more substantial diet.    Review of Systems  Objective:     Temp:  [96.4 °F (35.8 °C)-98.2 °F (36.8 °C)] 98 °F (36.7 °C)  Pulse:  [64-90] 73  Resp:  [7-21] 18  SpO2:  [96 %-100 %] 96 %  BP: (103-131)/(54-74) 122/58     Body mass index is 28.94 kg/m².           Drains     Drain                 Nephrostomy 08/13/20 0805 Left 12 Fr. 30 days         Nephrostomy 08/13/20 0809 Right 12 Fr. 30 days         Closed/Suction Drain 09/11/20 1832 Right Abdomen Bulb 15 Fr. less than 1 day                Physical Exam   Constitutional: No distress.   HENT:   Head: Normocephalic and atraumatic.   Eyes: Conjunctivae are normal. No scleral icterus.   Neck: Normal range of motion.   Cardiovascular: Normal rate.    Pulmonary/Chest: Effort normal. No respiratory distress.   Abdominal: Soft. She exhibits no distension. There is abdominal tenderness (appropriate). There is no rebound and no guarding.   Urostomy p/p/p draining clear yellow urine  Ureteral stents in place  ORESTES draining ss   Musculoskeletal: Normal range of motion.      Comments: SCDs in place   Neurological: She is alert.   Skin: Skin is warm and dry. She is not diaphoretic.         Significant Labs:    BMP:  Recent Labs   Lab 09/11/20 1956 09/12/20  0400    142   K 4.9 4.3   * 110   CO2 15* 23   BUN 10 12   CREATININE 1.1 1.2   CALCIUM 6.9* 8.3*       CBC:   Recent Labs   Lab 09/11/20 1956 09/12/20  0400   WBC 13.33* 11.56   HGB 9.7* 9.4*   HCT 33.0* 31.7*    207       All pertinent labs results from the past 24 hours have been reviewed.    Significant Imaging:  All pertinent imaging results/findings from the past 24 hours have been reviewed.

## 2020-09-12 NOTE — PLAN OF CARE
JAISON reyes completed and goals established 9/12/2020  JONNY Mak/JIMMY  Pager #: [phone]  9/12/2020    Problem: Occupational Therapy Goal  Goal: Occupational Therapy Goal  Description: Goals to be met by: 9/26/2020    Patient will increase functional independence with ADLs by performing:    LE Dressing with Supervision.  Grooming while standing with Supervision.  Toileting from toilet with Supervision for hygiene and clothing management.   Supine to sit with Supervision.  Toilet transfer to toilet with Supervision.    Outcome: Ongoing, Progressing

## 2020-09-12 NOTE — ASSESSMENT & PLAN NOTE
- po tylenol, PO oxycodone for pain control, toradol for 3 days  - Will defer advancing diet to CRS  - 84 mL/hr MIVF, 500cc bolus this am, UOP has been marginal  - CBC, BMP, Mg, Phos daily  - Ambulate qid  - PT/OT consult  - Perioperative ancef x24 hours  - Wound care ostomy consult  - Social work consult re: discharge planning  - Lovenox daily  - Drains: maintain ORESTES, ureteral stents, and red rubber  - Prophylaxis: IS, SCDs, GI ppx

## 2020-09-12 NOTE — PLAN OF CARE
Pt arrived on floor very drowsy and sleepy. Vitals charted, pt responds to voice, no complaints of pain. Urostomy is place, emptied and charted. Pre existing nephrostomy bags in place, however non functional at this time. SCDs on. IV intact and infusing. Pt resting. Safety measures in place, call bell with in reach. No falls this shift.

## 2020-09-12 NOTE — HPI
Edita Hardin [facility] is a 55 y.o. female with a history of cervical cancer s/p pelvic radiation. She has since developed bilateral ureteral strictures and bilateral hydronephrosis R>L. She had a MAG3 scan confirmed presence of bilateral obstruction. She is s/p open transverse colon conduit urinary diversion on 9/11/2020.

## 2020-09-12 NOTE — PT/OT/SLP EVAL
"Physical Therapy Evaluation    Patient Name:  Edita Riley   MRN:  [id number]    Recommendations:     Discharge Recommendations:  home with home health   Discharge Equipment Recommendations: bath bench   Barriers to discharge: decreased functional mobility    Assessment:     Edita Riley is a 55 y.o. female admitted with a medical diagnosis of Bilateral ureteral obstruction.  She presents with the following impairments/functional limitations:  weakness, impaired endurance, impaired self care skills, impaired functional mobilty, gait instability, impaired balance.  Tolerated session with c/o weakness.  Performed mobility with min A - CGA.  Pt able to amb short distance with RW and demo decreased gait speed, shuffled gait pattern, mild FFP and mild unsteadiness.  Pt safe to amb with assistance of 1x person.  Pt would benefit from continued skilled acute PT 3x/wk to improve functional mobility.  Recommending pt receive PT services in  setting following d/c from hospital once medically cleared.      Rehab Prognosis: Good; patient would benefit from acute skilled PT services to address these deficits and reach maximum level of function.    Recent Surgery: Procedure(s) (LRB):  MOBILIZATION, COLONIC (N/A)  CREATION,CONDUIT,TRANSVERSE COLON (N/A) 1 Day Post-Op    Plan:     During this hospitalization, patient to be seen 4 x/week to address the identified rehab impairments via therapeutic exercises, neuromuscular re-education, therapeutic activities, gait training and progress toward the following goals:    · Plan of Care Expires:  10/07/20    Subjective     Chief Complaint: fatigue, weakness  Patient/Family Comments/goals: "I need to get up"  Pain/Comfort:  · Pain Rating 1: 0/10    Patients cultural, spiritual, Anglican conflicts given the current situation: no    Living Environment:  Pt lives with  and daughter in 1st floor apartment with 3STE LHR.  Pt reports no falls.  Prior to " admission, patients level of function was requiring assistance for ADLs and mobility.  Equipment used at home: walker, rolling.  DME owned (not currently used): rolling walker.  Upon discharge, patient will have assistance from family.    Objective:     Communicated with RN and OT prior to session.  Patient found HOB elevated with telemetry, peripheral IV, nephrostomy, ORESTES drain  upon PT entry to room.    General Precautions: Standard, fall   Orthopedic Precautions:N/A   Braces: N/A     Exams:  · Cognitive Exam:  Patient is oriented to Person, Place, Time and Situation  · RLE ROM: WFL  · RLE Strength: grossly 4/5  · LLE ROM: WFL  · LLE Strength: grossly 4/5    Functional Mobility:  · Bed Mobility:     · Rolling Right: minimum assistance  · Scooting: contact guard assistance  · Supine to Sit: minimum assistance  · Transfers:     · Sit to Stand:  minimum assistance with rolling walker  · Bed to Chair: contact guard assistance with  rolling walker  using  Step Transfer  · Gait: 20ft x2 with RW CGA   · demo decreased gait speed, shuffled gait pattern, mild FFP and mild unsteadiness  · Balance: standing (SBA-CGA)    Therapeutic Activities and Exercises:  Pt educated on: PT role/POC; safety c mobility; benefits of OOB activities; performing therex; d/c recs - v/u  -sit<>stand 4x  -standing x5mins  -therex (LAQ, hip flex, AP)    AM-PAC 6 CLICK MOBILITY  Total Score:17     Patient left up in chair with all lines intact, call button in reach and RN notified.    GOALS:   Multidisciplinary Problems     Physical Therapy Goals        Problem: Physical Therapy Goal    Goal Priority Disciplines Outcome Goal Variances Interventions   Physical Therapy Goal     PT, PT/OT Ongoing, Progressing     Description: Goals to be met by: 10/3/2020     Patient will increase functional independence with mobility by performin. Supine to sit with Set-up [place]  2. Sit to supine with Set-up [place]  3. Sit to stand transfer with  Supervision  4. Bed to chair transfer with Supervision using Rolling Walker  5. Gait  x 120 feet with Stand-by Assistance using Rolling Walker.   6. Ascend/descend 3 stair with left Handrails Stand-by Assistance                   History:     Past Medical History:   Diagnosis Date    Abnormal mammogram 8/25/2020    Anxiety     Cervical cancer 2014    Chronic back pain     Depression     Diarrhea due to malabsorption 11/14/2018    Fibromyalgia     Generalized abdominal pain 8/25/2020    GERD (gastroesophageal reflux disease)     Hiatal hernia 2014    History of cervical cancer 10/11/2018    History of cervical cancer 10/11/2018    Hx of psychiatric care     Cymbalta, trazodone    Hypertension     Hypomagnesemia 11/21/2018    Lactose intolerance     Metastatic squamous cell carcinoma to lymph node 10/11/2018    Nephrostomy tube displaced     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 drug 11/14/2018    Osteoarthritis of back     Psychiatric problem     Stroke 1972       Past Surgical History:   Procedure Laterality Date    BILATERAL OOPHORECTOMY  2015    CHOLECYSTECTOMY  11/09/2016    Done at [facility], path showed chronic cholecystitis and gallstones    cold knife conization  2014    COLONOSCOPY  2014    [facility]    COLONOSCOPY N/A 5/18/2018    Procedure: COLONOSCOPY;  Surgeon: Arden Gutiérrez MD;  [facility]  Service: Endoscopy;  Laterality: N/A;    COLONOSCOPY N/A 7/28/2020    Procedure: COLONOSCOPY;  Surgeon: Hammad Reynolds MD;  [facility]  Service: Colon and Rectal;  Laterality: N/A;  covid test [place] 7/25    CYSTOSCOPY WITH URETEROSCOPY, RETROGRADE PYELOGRAPHY, AND INSERTION OF STENT Bilateral 3/21/2020    Procedure: CYSTOSCOPY, WITH RETROGRADE PYELOGRAM,;  Surgeon: Leslie Balbuena MD;  Location: [address];  Service: Urology;  Laterality: Bilateral;    ESOPHAGOGASTRODUODENOSCOPY  2014     HYSTERECTOMY  2014    [facility] for cervical cancer    OOPHORECTOMY      RETROPERITONEAL LYMPHADENECTOMY Right 9/17/2018    Procedure: LYMPHADENECTOMY, RETROPERITONEUM;  Surgeon: Sebas Reed MD;  Location: [facility] OR [address] [place] [address];  Service: General;  Laterality: Right;  ILIAC       Time Tracking:     PT Received On: 09/12/20  PT Start Time: 0910     PT Stop Time: 0935  PT Total Time (min): 25 min     Billable Minutes: Evaluation 10 min and Therapeutic Activity 10 min      John Bowen, PT  09/12/2020

## 2020-09-12 NOTE — PROGRESS NOTES
[facility]  Urology  Progress Note    Patient Name: Edita Riley  MRN: [id number]  Admission Date: 9/11/2020  Hospital Length of Stay: 1 days  Code Status: Full Code   Attending Provider: Leslie Balbuena MD   Primary Care Physician: Audrey Mustafa MD    Subjective:     HPI:  Edita Riley is a 55 y.o. female with a history of cervical cancer s/p pelvic radiation. She has since developed bilateral ureteral strictures and bilateral hydronephrosis R>L. She had a MAG3 scan confirmed presence of bilateral obstruction. She is s/p open transverse colon conduit urinary diversion on 9/11/2020.     Interval History:   Doing well this morning. No issues overnight.   She did try to get out of bed at 2am and was a little unsteady.   Tolerating clear liquids. Pain well controlled. Would like more substantial diet.    Review of Systems  Objective:     Temp:  [96.4 °F (35.8 °C)-98.2 °F (36.8 °C)] 98 °F (36.7 °C)  Pulse:  [64-90] 73  Resp:  [7-21] 18  SpO2:  [96 %-100 %] 96 %  BP: (103-131)/(54-74) 122/58     Body mass index is 28.94 kg/m².           Drains     Drain                 Nephrostomy 08/13/20 0805 Left 12 Fr. 30 days         Nephrostomy 08/13/20 0809 Right 12 Fr. 30 days         Closed/Suction Drain 09/11/20 1832 Right Abdomen Bulb 15 Fr. less than 1 day                Physical Exam   Constitutional: No distress.   HENT:   Head: Normocephalic and atraumatic.   Eyes: Conjunctivae are normal. No scleral icterus.   Neck: Normal range of motion.   Cardiovascular: Normal rate.    Pulmonary/Chest: Effort normal. No respiratory distress.   Abdominal: Soft. She exhibits no distension. There is abdominal tenderness (appropriate). There is no rebound and no guarding.   Urostomy p/p/p draining clear yellow urine  Ureteral stents in place  ORESTES draining ss   Musculoskeletal: Normal range of motion.      Comments: SCDs in place   Neurological: She is alert.   Skin: Skin is warm and dry. She  is not diaphoretic.         Significant Labs:    BMP:  Recent Labs   Lab 09/11/20 1956 09/12/20  0400    142   K 4.9 4.3   * 110   CO2 15* 23   BUN 10 12   CREATININE 1.1 1.2   CALCIUM 6.9* 8.3*       CBC:   Recent Labs   Lab 09/11/20 1956 09/12/20  0400   WBC 13.33* 11.56   HGB 9.7* 9.4*   HCT 33.0* 31.7*    207       All pertinent labs results from the past 24 hours have been reviewed.    Significant Imaging:  All pertinent imaging results/findings from the past 24 hours have been reviewed.                  Assessment/Plan:     * Bilateral ureteral obstruction  - po tylenol, PO oxycodone for pain control, toradol for 3 days  - Will defer advancing diet to CRS  - 84 mL/hr MIVF, 500cc bolus this am, UOP has been marginal  - CBC, BMP, Mg, Phos daily  - Ambulate qid  - PT/OT consult  - Perioperative ancef x24 hours  - Wound care ostomy consult  - Social work consult re: discharge planning  - Lovenox daily  - Drains: maintain ORESTSE, ureteral stents, and red rubber  - Prophylaxis: IS, SCDs, GI ppx            VTE Risk Mitigation (From admission, onward)         Ordered     heparin (porcine) injection 5,000 Units  Every 8 hours      09/11/20 2024     IP VTE HIGH RISK PATIENT  Once      09/11/20 2024     Place sequential compression device  Until discontinued      09/11/20 2024     Place sequential compression device  Until discontinued      09/11/20 0509     Place sequential compression device  Until discontinued      09/11/20 0509                Julius Zamarripa MD  Urology  [facility]

## 2020-09-12 NOTE — PT/OT/SLP EVAL
"Occupational Therapy   Evaluation    Name: Edita Riley  MRN: [id number]  Admitting Diagnosis:  Bilateral ureteral obstruction     1 Day Post-Op  Pre-op Diagnosis: Bilateral ureteral obstruction [N13.5]  Hydronephrosis with ureteral stricture, not elsewhere classified [N13.1]  Bilateral hydronephrosis [N13.30]    Procedure(s):  MOBILIZATION, COLONIC  CREATION,CONDUIT,TRANSVERSE COLON     Recommendations:     Discharge Recommendations: home with home health  Discharge Equipment Recommendations:  bath bench  Barriers to discharge:  None    Assessment:     Edita Riley is a 55 y.o. female with a medical diagnosis of Bilateral ureteral obstruction. She presents with the following performance deficits affecting function: weakness, impaired endurance, pain, impaired self care skills, impaired functional mobilty, gait instability, impaired balance. Patient agreeable to participate in therapy evaluation and motivated to perform OOB activity and functional mobility. Patient c/o soreness with movement but tolerated session well. Patient able to ambulate within room with CGA with RW and tolerated standing at sink 5 min to complete grooming tasks. At this time, patient will continue to benefit from acute skilled therapy intervention to address deficits/underlying impairments and progress towards prior level of function. After discharge, patient will benefit from receiving home health therapy services to ensure safety and independence in the home environment.    Rehab Prognosis: Good; patient would benefit from acute skilled OT services to address these deficits and reach maximum level of function.       Plan:     Patient to be seen 4 x/week to address the above listed problems via self-care/home management, therapeutic activities, therapeutic exercises  · Plan of Care Expires: 10/11/20  · Plan of Care Reviewed with: patient    Subjective     Chief Complaint: soreness with movement  Patient Comments: "I'm " "glad to see yall. I like to walk".     Occupational Profile:  Living Environment: Patient lives with spouse and daughter in [facility] with 3 MANAV, L handrail.   Previous level of function: Family assists as needed with ADLs. Patient ambulates using no AD but owns a RW.  Equipment Used at Home: walker, rolling  Assistance upon Discharge: Family can assist    Pain/Comfort:  · Pain Rating 1: 0/10(c/o soreness but not pain)    Patients cultural, spiritual, Scientology conflicts given the current situation: no    Objective:     Communicated with: RN prior to session. Patient found HOB elevated with peripheral IV, nephrostomy, ORESTES drain upon OT entry to room. PT present for co-evaluation.    General Precautions: Standard, fall   Orthopedic Precautions:N/A   Braces: N/A     Occupational Performance:    Bed Mobility:    · Patient completed Scooting/Bridging with minimum assistance  · Patient completed Supine to Sit with minimum assistance with HOB elevated using bed rail    Functional Mobility/Transfers:  · Patient completed Sit <> Stand Transfer with minimum assistance with rolling walker   · Patient completed Bed > Chair Transfer using Step Transfer technique with minimum assistance with rolling walker  · Functional Mobility: Patient ambulated EOB > bathroom and bathroom > bedside chair with CGA with RW.    Activities of Daily Living:  · Grooming: SBA/CGA standing at sink ~5 min; cues provided for correc positioning of RW when standing at sink    Cognitive/Visual Perceptual:  Cognitive/Psychosocial Skills:     -       Follows Commands/attention:Follows multistep  commands  -       Communication: clear/fluent  -       Memory: No Deficits noted  -       Safety awareness/insight to disability: intact   -       Mood/Affect/Coping skills/emotional control: Appropriate to situation, Cooperative and Pleasant    Physical Exam:  Postural examination/scapula alignment:    -       No postural abnormalities identified  Upper Extremity Range " of Motion:    -       Right Upper Extremity: WFL  -       Left Upper Extremity: WFL  Upper Extremity Strength:    -       Right Upper Extremity: WFL  -       Left Upper Extremity: WFL    AMPAC 6 Click ADL:  AMPAC Total Score: 17    Treatment & Education:   Therapist provided facilitation and instruction of proper body mechanics and fall prevention strategies during tasks listed above.   Instructed patient to sit in bedside chair daily to increase OOB/activity tolerance.   Instructed patient to use call light to have nursing staff assist with needs/transfers.   Discussed OT POC and answered all questions within OT scope of practice.   Whiteboard updated   Education:    Patient left up in chair with all lines intact and call button in reach    GOALS:   Multidisciplinary Problems     Occupational Therapy Goals        Problem: Occupational Therapy Goal    Goal Priority Disciplines Outcome Interventions   Occupational Therapy Goal     OT, PT/OT Ongoing, Progressing    Description: Goals to be met by: 9/26/2020    Patient will increase functional independence with ADLs by performing:    LE Dressing with Supervision.  Grooming while standing with Supervision.  Toileting from toilet with Supervision for hygiene and clothing management.   Supine to sit with Supervision.  Toilet transfer to toilet with Supervision.                     History:     Past Medical History:   Diagnosis Date    Abnormal mammogram 8/25/2020    Anxiety     Cervical cancer 2014    Chronic back pain     Depression     Diarrhea due to malabsorption 11/14/2018    Fibromyalgia     Generalized abdominal pain 8/25/2020    GERD (gastroesophageal reflux disease)     Hiatal hernia 2014    History of cervical cancer 10/11/2018    History of cervical cancer 10/11/2018    Hx of psychiatric care     Cymbalta, trazodone    Hypertension     Hypomagnesemia 11/21/2018    Lactose intolerance     Metastatic squamous cell carcinoma to lymph node  10/11/2018    Nephrostomy tube displaced     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 drug 11/14/2018    Osteoarthritis of back     Psychiatric problem     Stroke 1972       Past Surgical History:   Procedure Laterality Date    BILATERAL OOPHORECTOMY  2015    CHOLECYSTECTOMY  11/09/2016    Done at [facility], path showed chronic cholecystitis and gallstones    cold knife conization  2014    COLONOSCOPY  2014    [facility]    COLONOSCOPY N/A 5/18/2018    Procedure: COLONOSCOPY;  Surgeon: Arden Gutiérrez MD;  Location: [facility] ENDO (4TH FLR);  Service: Endoscopy;  Laterality: N/A;    COLONOSCOPY N/A 7/28/2020    Procedure: COLONOSCOPY;  Surgeon: Hammad Reynolds MD;  Location: [facility] ENDO (4TH FLR);  Service: Colon and Rectal;  Laterality: N/A;  covid test [place] 7/25    CYSTOSCOPY WITH URETEROSCOPY, RETROGRADE PYELOGRAPHY, AND INSERTION OF STENT Bilateral 3/21/2020    Procedure: CYSTOSCOPY, WITH RETROGRADE PYELOGRAM,;  Surgeon: Leslie Balbuena MD;  Location: [facility] OR 1ST FLR;  Service: Urology;  Laterality: Bilateral;    ESOPHAGOGASTRODUODENOSCOPY  2014    HYSTERECTOMY  2014    [facility] for cervical cancer    OOPHORECTOMY      RETROPERITONEAL LYMPHADENECTOMY Right 9/17/2018    Procedure: LYMPHADENECTOMY, RETROPERITONEUM;  Surgeon: Sebas Reed MD;  Location: [facility] OR 2ND FLR;  Service: General;  Laterality: Right;  ILIAC       Time Tracking:     OT Date of Treatment: 09/12/20  OT Start Time: 0914  OT Stop Time: 0936  OT Total Time (min): 22 min    Billable Minutes:Evaluation 10  Self Care/Home Management 12    Megha Paovn OT  9/12/2020

## 2020-09-12 NOTE — OP NOTE
[facility]  Operative Note    Date: 09/11/2020    Pre-Op Diagnosis:   1. Bilateral ureteral obstruction  2. Bilateral ureteral strictures  3. Bilateral hydronephrosis  4. History cervical cancer s/p radiation therapy     Post-Op Diagnosis:  Patient Active Problem List   Diagnosis    Osteoarthritis of back    Hiatal hernia    Essential hypertension    Lower extremity pain, diffuse    Weakness of both lower extremities    Impaired functional mobility, balance, gait, and endurance    Schatzki's ring    Colon polyp    Internal hemorrhoids    Cardiovascular event risk -- low    Hemifacial spasm    Severe episode of recurrent major depressive disorder, with psychotic features    Fibromyalgia    Facial palsy    Sleep stage dysfunction    Carpal tunnel syndrome on right    Weakness    Gastroesophageal reflux disease with esophagitis    Lymphadenopathy    History of cervical cancer    Metastatic squamous cell carcinoma to lymph node    Generalized anxiety disorder    PTSD (post-traumatic stress disorder)    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 drug    Diarrhea due to malabsorption    Seizure-like activity    Stroke    Electrolyte disorder    ODESSA (acute kidney injury)    Anemia due to chronic kidney disease    Need for shingles vaccine    Hydronephrosis s/p bilateral nephrostomy tube    Urinary tract infection associated with nephrostomy catheter    Family history of colon cancer    Nephrostomy tube displaced    Generalized abdominal pain    Abnormal mammogram    Radiation cystitis    Bilateral ureteral obstruction     Procedure(s) Performed:   1.  Colon conduit urinary diversion  2.  Bilateral ureteral stent placement    Specimen(s): Ureter     Staff Surgeon: Leslie Balbuena MD    Assistant Surgeon: Mana Wilks MD    Anesthesia:  General endotracheal anesthesia    Indications: Edita Riley is a 55 y.o. female with a history of cervical cancer s/p  pelvic radiation. She has since developed bilateral ureteral strictures and bilateral hydronephrosis R>L. She had a MAG3 scan confirmed presence of bilateral obstruction. She presents today for a urinary diversion using a colon conduit.     Findings:    - radiated tissue was dense and fibrotic with distorted planes allowing for a technically difficult dissection of the ureters- 22 modifier  - CRS assisted with harvesting transverse colon     22 modifier - Patient with bilateral ureteral obstruction and bilateral hydronephrosis secondary to pelvic radiation due to cervical cancer. Case required 100% more time and effort than normally anticipated for this surgery due to distortion of tissue planes and technical difficulty.    Estimated Blood Loss: 300 mL    Drains:   - Bilateral 7 Portuguese urinary diversion stents  - 16 Portuguese red rubber catheter per stoma  - 15 mm Michael drain, LLQ    Procedure in detail: After informed consent was obtained the patient was brought to the operating suite to the operating room.  General anesthesia was induced. TEDs and SCDs were applied and working prior to induction. Appropriate prophylactic antibiotics were administered based on the patient's prior cultures. The patient was placed in the supine position, then prepped and draped in the usual standard sterile fashion. Timeout was performed. A 16 Fr Little was placed per urethra.    A lower midline incision was incised from above the pubis cranially to just below the xyphoid with a 10 blade.  Bovie electrocautery was used to dissect through the subcutaneous tissues until the rectus fascia was identified. The fascia was then opened in the midline along the length of our incision. The rectus was split in the midline and the pelvic extraperitoneal space was entered anterior to the bladder.  We then opened the peritoneum with Metzenbaum scissors. Multiple enteric adhesions were lysed with Gray scissors. The Bookwalter retractor was deployed for  retraction. The bowel were gently retracted.   The small bowel was adherent to the bladder and was dissected away with sharp dissection using Allen scissors.      The peritoneum over the right common iliac vessels was incised. The supposed right ureter was identified and isolated with a vessel loop. The ureter was mobilized proximally and distally until adequate length was achieved. It did not appear to be the right caliber size for ureter but had an adequate lumen. We were skeptical that it was in fact the right ureter, but given patients history of radiation and the distorted tissue planes, we decided to press on. The distal end of the ureter was then doubly ligated with metal clips and transected with Metzenbaum scissors between the clips. This same procedure was then performed on the left side after reflecting the large bowel away from the pelvic side wall in a Ram maneuver. We were able to isolate a left ureter, but were skeptical as well of its caliber.     At this point, colorectal surgery was present and harvested the transverse colon. Please refer to their op note for this portion of the surgery. They also assisted in taking down other restricting enteric adhesions.     We next turned our attention to creating the stoma. The site previously marked at the LLQ by the ostomy nurse was excised in a circular fashion. The fascia was incised, then dilated until two fingers could pass easily. Four 2-0 vicryl sutures were pre-placed on the external fascia. The conduit was grasped with a Oxford and brought out through the hiatus. The previously placed vicryl sutures were used to secure the bowel at the level of the fascia. 3-0 vicryl was then used to mature the stoma circumferentially.    To confirm we dissected right ureter, we decided to cannulate the right ureter with the single J stent and observe for flow of urine. However there was none.  It was anastamosed to the bowel with a water tight closure.      We  then proceeded to the left side.  However, it appeared that this was not the ureter, but the gonadal vessels.  We again dissected further in the area over the leftt common iliac vessels. The left ureter was then correctly identified and isolated with a vessel loop. The ureter was mobilized proximally and distally until adequate length was achieved. The distal end of the ureter was then doubly ligated with metal clips and transected with Metzenbaum scissors between the clips. This same procedure was then performed on the right side, where the ureter was again correctly identified.    The ureteroenteric anastomoses were then performed beginning with the right side.  The previous anastamosis was taken down and the bowel was closed with monocryl suture.   The vein was ligated with a clip and the excess removed.   Tenotomy scissors were used to incise the bowel wall where our anastomosis to the right ureter would be created. The ureter was then incised with Tenotomy scissors and spatulated with Menjivar scissors. There was good urine flow from the ureter. Two sutures of 4-0 monocryl were used to run either side of the anastomosis, ensuring adequate mucosa-to-mucosa apposition. Prior to completion of the anastomosis, a Yankauer suction tip was placed per stoma near the opening of the anastomosis. The single J stent was inserted through the Yankauer and brought through the opening of the anastomosis. This was then manually directed up the ureter until resistance was met. The wire was removed, leaving the stent in adequate position.  There was good urine flow noted The anastomosis was then completed. A 14 Sami red rubber catheter was inserted per stoma and the conduit distended with sterile normal saline. There were no leaks noted at the anastomosis.  There was leakage noted from the previous bowel closure and this was addressed with several figure of eight sutures of 3-0 monocryl.     The same procedure was completed on the  left ureter. Again the bowel was incised and the ureter incised and spatulated. The anastomosis was performed with two 4-0 monocryl sutures. A diversion stent was placed. A leak test revealed no leaks.    A 15mm Michael drain was brought out of the left lower quadrant and secured in place with a 2-0 nylon suture. The urinary diversion stents were sutured to the skin near the stoma with a 3-0 chromic. A 14 Burundian red rubber catheter was secured in the stoma with a 3-0 chromic.    The fascia was closed using looped 0 PDS. The subcutaneous tissues were closed using 3-0 vicryl in a running fashion. The skin was stapled closed. The drain was dressed with a drain sponge.  The stoma was dressed by applying mastisol on the skin, it was dried and the phlange was placed and the bag attached.      The patient tolerated the procedure well and was transferred to the PACU in stable condition having suffered no untowards event.      Disposition: The patient will remain on the urology service for recovery.    MD ISAAK Bernard was present for the entire case and agree with the above note.

## 2020-09-12 NOTE — PROGRESS NOTES
[facility]  Colorectal Surgery  Progress Note    Patient Name: Edita Riley  MRN: [id number]  Admission Date: 9/11/2020  Hospital Length of Stay: 1 days  Attending Physician: Leslie Balbuena MD    Subjective:     Interval History:   No acute events overnight.   Doing well, pain well controlled.   Tolerating diet, no nausea/vomiting.   Passing flatus.     Post-Op Info:  Procedure(s) (LRB):  MOBILIZATION, COLONIC (N/A)  CREATION,CONDUIT,TRANSVERSE COLON (N/A)   1 Day Post-Op      Medications:  Continuous Infusions:   sodium chloride 0.9% 125 mL/hr at 09/11/20 2044     Scheduled Meds:   acetaminophen  1,000 mg Oral Q6H    ceFAZolin (ANCEF) IVPB  2 g Intravenous Q8H    famotidine (PF)  20 mg Intravenous BID    heparin (porcine)  5,000 Units Subcutaneous Q8H    ketorolac  15 mg Intravenous Q6H    pregabalin  150 mg Oral QHS     PRN Meds:   HYDROmorphone    methocarbamoL    oxyCODONE    oxyCODONE    sodium chloride 0.9%    sodium chloride 0.9%        Objective:     Vital Signs (Most Recent):  Temp: 98 °F (36.7 °C) (09/12/20 0340)  Pulse: 73 (09/12/20 0340)  Resp: 18 (09/12/20 0340)  BP: (!) 122/58 (09/12/20 0340)  SpO2: 96 % (09/12/20 0340) Vital Signs (24h Range):  Temp:  [96.4 °F (35.8 °C)-98.2 °F (36.8 °C)] 98 °F (36.7 °C)  Pulse:  [64-90] 73  Resp:  [7-21] 18  SpO2:  [96 %-100 %] 96 %  BP: (103-131)/(54-74) 122/58     Intake/Output - Last 3 Shifts       09/10 0700 - 09/11 0659 09/11 0700 - 09/12 0659 09/12 0700 - 09/13 0659    P.O.  120     I.V. (mL/kg)  6195.8 (69.7)     IV Piggyback  200     Total Intake(mL/kg)  6515.8 (73.3)     Urine (mL/kg/hr)  1450 (0.7)     Drains  120     Blood  300     Total Output  1870     Net  +4645.8                  Physical Exam  Constitutional:       General: She is not in acute distress.  HENT:      Head: Normocephalic and atraumatic.      Right Ear: External ear normal.      Left Ear: External ear normal.      Nose: Nose normal.       Mouth/Throat:      Mouth: Mucous membranes are moist.   Eyes:      Extraocular Movements: Extraocular movements intact.      Conjunctiva/sclera: Conjunctivae normal.      Pupils: Pupils are equal, round, and reactive to light.   Neck:      Musculoskeletal: Normal range of motion.   Cardiovascular:      Rate and Rhythm: Normal rate and regular rhythm.      Pulses: Normal pulses.      Heart sounds: Normal heart sounds.   Pulmonary:      Effort: Pulmonary effort is normal.      Breath sounds: Normal breath sounds.   Abdominal:      General: There is no distension.      Palpations: Abdomen is soft.      Comments: Appropriate tenderness   Musculoskeletal: Normal range of motion.   Skin:     General: Skin is warm and dry.   Neurological:      General: No focal deficit present.      Mental Status: She is alert and oriented to person, place, and time.   Psychiatric:         Mood and Affect: Mood normal.         Behavior: Behavior normal.         Significant Labs:  BMP (Last 3 Results):   Recent Labs   Lab 09/11/20 1956 09/12/20  0400   * 130*    142   K 4.9 4.3   * 110   CO2 15* 23   BUN 10 12   CREATININE 1.1 1.2   CALCIUM 6.9* 8.3*   MG 2.1 1.9     CBC (Last 3 Results):   Recent Labs   Lab 09/11/20 1956 09/12/20  0400   WBC 13.33* 11.56   RBC 3.66* 3.52*   HGB 9.7* 9.4*   HCT 33.0* 31.7*    207   MCV 90 90   MCH 26.5* 26.7*   MCHC 29.4* 29.7*         Assessment/Plan:     * Bilateral ureteral obstruction  Ms. Riley is a 55 y.o. female with b/l ureteral obstruction s/p transverse colon conduit on 9/11/2020.     - Okay to advance to low res diet  - Rest of care per primary  - Will continue to follow           Cecile Francisco MD  Colorectal Surgery  [facility]

## 2020-09-12 NOTE — BRIEF OP NOTE
Brief Operative Note       Surgery Date: 9/11/2020     Staff surgeon: Surgeon(s) and Role:  Panel 1:     * Leslie Balbuena MD - Primary     * Mana Wilks MD - Resident - Assisting  Panel 2:     * Hammad Reynolds MD - Primary     * Moe Mclean MD - Fellow    Pre-op Diagnosis:    Bilateral ureteral obstruction [N13.5]  Hydronephrosis with ureteral stricture, not elsewhere classified [N13.1]  Bilateral hydronephrosis [N13.30]    Post-op Diagnosis:    Bilateral ureteral obstruction [N13.5]  Hydronephrosis with ureteral stricture, not elsewhere classified [N13.1]  Bilateral hydronephrosis [N13.30]    Procedure:  Procedure(s) (LRB):  MOBILIZATION, COLONIC (N/A)  CREATION,CONDUIT,TRANSVERSE COLON (N/A)    22 modifier - Patient with bilateral ureteral obstruction and bilateral hydronephrosis secondary to pelvic radiation due to cervical cancer. Case required 100% more time and effort than normally anticipated for this surgery due to technical difficulty.    Anesthesia: General    Specimen: Ureter     Estimated Blood Loss: 300 mL        Drains:   15 jhon drain RLQ   16 Fr red rubber catheter in urostomy   2 ureteral stents (red and blue) in urostomy    Disposition: PACU - hemodynamically stable.           Condition: Good    I was present for the entire case and agree with the above note.

## 2020-09-12 NOTE — TRANSFER OF CARE
"Anesthesia Transfer of Care Note    [facility]    Procedure(s) Performed: Procedure(s) (LRB):  MOBILIZATION, COLONIC (N/A)  CREATION,CONDUIT,TRANSVERSE COLON (N/A)    Patient location: PACU    Anesthesia Type: general    Transport from OR: Transported from OR on 6-10 L/min O2 by face mask with adequate spontaneous ventilation    Post pain: adequate analgesia    Post assessment: no apparent anesthetic complications and tolerated procedure well    Post vital signs: stable    Level of consciousness: awake, alert and oriented    Nausea/Vomiting: no nausea/vomiting    Complications: none    Transfer of care protocol was followed      Last vitals:   Visit Vitals  /68   Pulse 90   Temp 36.8 °C (98.2 °F) (Temporal)   Resp 12   Ht 5' 9" (1.753 m)   Wt 88.9 kg (196 lb)   LMP 06/08/2014 (Approximate)   SpO2 100%   Breastfeeding No   BMI 28.94 kg/m²     "

## 2020-09-12 NOTE — PLAN OF CARE
Problem: Physical Therapy Goal  Goal: Physical Therapy Goal  Description: Goals to be met by: 10/3/2020     Patient will increase functional independence with mobility by performin. Supine to sit with Set-up [place]  2. Sit to supine with Set-up [place]  3. Sit to stand transfer with Supervision  4. Bed to chair transfer with Supervision using Rolling Walker  5. Gait  x 120 feet with Stand-by Assistance using Rolling Walker.   6. Ascend/descend 3 stair with left Handrails Stand-by Assistance  Outcome: Ongoing, Progressing   Eval completed and POC established    John Bowen, PT,DPT  2020

## 2020-09-12 NOTE — PLAN OF CARE
Problem: Adult Inpatient Plan of Care  Goal: Plan of Care Review  9/12/2020 1856 by Mery Beltrán RN  Outcome: Ongoing, Progressing  9/12/2020 1826 by Mery Beltrán RN  Outcome: Ongoing, Progressing     Problem: Fall Injury Risk  Goal: Absence of Fall and Fall-Related Injury  Outcome: Ongoing, Progressing   Pt AAOx4, urostomy cdi and draining pink tinged urine, pt eating 100% of meals, IV intact and infusing, bi lat nephrostomy tubes cdi and not draining, ORESTES drain intact and draining serosanguinous fluid, pt ambulated w/ PT and was up in chair comfortably for most of day, c/o pain managed w/ scheduled meds, no falls, wctm

## 2020-09-13 LAB
ANION GAP SERPL CALC-SCNC: 6 MMOL/L (ref 8–16)
ANISOCYTOSIS BLD QL SMEAR: SLIGHT
BASOPHILS # BLD AUTO: 0.03 K/UL (ref 0–0.2)
BASOPHILS NFR BLD: 0.3 % (ref 0–1.9)
BUN SERPL-MCNC: 13 MG/DL (ref 6–20)
CALCIUM SERPL-MCNC: 7.9 MG/DL (ref 8.7–10.5)
CHLORIDE SERPL-SCNC: 113 MMOL/L (ref 95–110)
CO2 SERPL-SCNC: 24 MMOL/L (ref 23–29)
CREAT SERPL-MCNC: 1 MG/DL (ref 0.5–1.4)
DIFFERENTIAL METHOD: ABNORMAL
EOSINOPHIL # BLD AUTO: 0.3 K/UL (ref 0–0.5)
EOSINOPHIL NFR BLD: 2.8 % (ref 0–8)
ERYTHROCYTE [DISTWIDTH] IN BLOOD BY AUTOMATED COUNT: 16.3 % (ref 11.5–14.5)
EST. GFR  (AFRICAN AMERICAN): >60 ML/MIN/1.73 M^2
EST. GFR  (NON AFRICAN AMERICAN): >60 ML/MIN/1.73 M^2
GLUCOSE SERPL-MCNC: 123 MG/DL (ref 70–110)
HCT VFR BLD AUTO: 26.4 % (ref 37–48.5)
HGB BLD-MCNC: 7.9 G/DL (ref 12–16)
HYPOCHROMIA BLD QL SMEAR: ABNORMAL
IMM GRANULOCYTES # BLD AUTO: 0.04 K/UL (ref 0–0.04)
IMM GRANULOCYTES NFR BLD AUTO: 0.4 % (ref 0–0.5)
LYMPHOCYTES # BLD AUTO: 1.4 K/UL (ref 1–4.8)
LYMPHOCYTES NFR BLD: 14.4 % (ref 18–48)
MAGNESIUM SERPL-MCNC: 1.8 MG/DL (ref 1.6–2.6)
MCH RBC QN AUTO: 27.1 PG (ref 27–31)
MCHC RBC AUTO-ENTMCNC: 29.9 G/DL (ref 32–36)
MCV RBC AUTO: 90 FL (ref 82–98)
MONOCYTES # BLD AUTO: 0.8 K/UL (ref 0.3–1)
MONOCYTES NFR BLD: 8.5 % (ref 4–15)
NEUTROPHILS # BLD AUTO: 6.9 K/UL (ref 1.8–7.7)
NEUTROPHILS NFR BLD: 73.6 % (ref 38–73)
NRBC BLD-RTO: 0 /100 WBC
PHOSPHATE SERPL-MCNC: 1.9 MG/DL (ref 2.7–4.5)
PLATELET # BLD AUTO: 160 K/UL (ref 150–350)
PLATELET BLD QL SMEAR: ABNORMAL
PMV BLD AUTO: 11.4 FL (ref 9.2–12.9)
POTASSIUM SERPL-SCNC: 3.8 MMOL/L (ref 3.5–5.1)
RBC # BLD AUTO: 2.92 M/UL (ref 4–5.4)
SODIUM SERPL-SCNC: 143 MMOL/L (ref 136–145)
WBC # BLD AUTO: 9.37 K/UL (ref 3.9–12.7)

## 2020-09-13 PROCEDURE — S0028 INJECTION, FAMOTIDINE, 20 MG: HCPCS | Performed by: STUDENT IN AN ORGANIZED HEALTH CARE EDUCATION/TRAINING PROGRAM

## 2020-09-13 PROCEDURE — 63600175 PHARM REV CODE 636 W HCPCS: Performed by: STUDENT IN AN ORGANIZED HEALTH CARE EDUCATION/TRAINING PROGRAM

## 2020-09-13 PROCEDURE — 25000003 PHARM REV CODE 250: Performed by: STUDENT IN AN ORGANIZED HEALTH CARE EDUCATION/TRAINING PROGRAM

## 2020-09-13 PROCEDURE — 83735 ASSAY OF MAGNESIUM: CPT

## 2020-09-13 PROCEDURE — 11000001 HC ACUTE MED/SURG PRIVATE ROOM

## 2020-09-13 PROCEDURE — 36415 COLL VENOUS BLD VENIPUNCTURE: CPT

## 2020-09-13 PROCEDURE — 84100 ASSAY OF PHOSPHORUS: CPT

## 2020-09-13 PROCEDURE — 80048 BASIC METABOLIC PNL TOTAL CA: CPT

## 2020-09-13 PROCEDURE — 85025 COMPLETE CBC W/AUTO DIFF WBC: CPT

## 2020-09-13 RX ORDER — FAMOTIDINE 20 MG/1
20 TABLET, FILM COATED ORAL 2 TIMES DAILY
Status: DISCONTINUED | OUTPATIENT
Start: 2020-09-13 | End: 2020-09-18

## 2020-09-13 RX ORDER — SODIUM CHLORIDE 9 MG/ML
INJECTION, SOLUTION INTRAVENOUS CONTINUOUS
Status: ACTIVE | OUTPATIENT
Start: 2020-09-13 | End: 2020-09-13

## 2020-09-13 RX ORDER — LANOLIN ALCOHOL/MO/W.PET/CERES
400 CREAM (GRAM) TOPICAL ONCE
Status: COMPLETED | OUTPATIENT
Start: 2020-09-13 | End: 2020-09-13

## 2020-09-13 RX ORDER — SODIUM,POTASSIUM PHOSPHATES 280-250MG
1 POWDER IN PACKET (EA) ORAL EVERY 6 HOURS
Status: COMPLETED | OUTPATIENT
Start: 2020-09-13 | End: 2020-09-14

## 2020-09-13 RX ADMIN — KETOROLAC TROMETHAMINE 15 MG: 30 INJECTION, SOLUTION INTRAMUSCULAR at 12:09

## 2020-09-13 RX ADMIN — FAMOTIDINE 20 MG: 10 INJECTION, SOLUTION INTRAVENOUS at 08:09

## 2020-09-13 RX ADMIN — CALCIUM GLUCONATE 1000 MG: 98 INJECTION, SOLUTION INTRAVENOUS at 10:09

## 2020-09-13 RX ADMIN — PREGABALIN 150 MG: 50 CAPSULE ORAL at 08:09

## 2020-09-13 RX ADMIN — ACETAMINOPHEN 1000 MG: 325 TABLET ORAL at 05:09

## 2020-09-13 RX ADMIN — POTASSIUM & SODIUM PHOSPHATES POWDER PACK 280-160-250 MG 1 PACKET: 280-160-250 PACK at 10:09

## 2020-09-13 RX ADMIN — KETOROLAC TROMETHAMINE 15 MG: 30 INJECTION, SOLUTION INTRAMUSCULAR at 05:09

## 2020-09-13 RX ADMIN — FAMOTIDINE 20 MG: 20 TABLET, FILM COATED ORAL at 12:09

## 2020-09-13 RX ADMIN — POTASSIUM & SODIUM PHOSPHATES POWDER PACK 280-160-250 MG 1 PACKET: 280-160-250 PACK at 05:09

## 2020-09-13 RX ADMIN — FAMOTIDINE 20 MG: 20 TABLET, FILM COATED ORAL at 08:09

## 2020-09-13 RX ADMIN — ACETAMINOPHEN 1000 MG: 325 TABLET ORAL at 12:09

## 2020-09-13 RX ADMIN — Medication 400 MG: at 10:09

## 2020-09-13 RX ADMIN — SODIUM CHLORIDE: 0.9 INJECTION, SOLUTION INTRAVENOUS at 01:09

## 2020-09-13 RX ADMIN — KETOROLAC TROMETHAMINE 15 MG: 30 INJECTION, SOLUTION INTRAMUSCULAR at 06:09

## 2020-09-13 NOTE — OP NOTE
DATE OF PROCEDURE:  9/11/2020      PREOPERATIVE DIAGNOSES:  Bladder outlet obstruction     POSTOPERATIVE DIAGNOSES:  Bladder outlet obstruction     PROCEDURES PERFORMED:    1.  Adhesiolysis  2.  Isolation and procurement of segment of transverse colon to be used as conduit for urinary diversion      SURGEON:  Hammad Reynolds M.D.     ASSISTANT:  Veda Mclean M.D. [RES]     ANESTHESIA:  General endotracheal     See Urology operative note for IVF, EBL, UOP, Drains, specimens.     COMPLICATIONS:  None.     OPERATIVE FINDINGS:  Redundant transverse colon     INDICATIONS:  The patient is a 55-year-old female with bladder outlet obstruction due to prior pelvic radiation therapy for cervical cancer.  She is undergoing a urinary diversion by Urology, and I was asked to procure a segment of transverse colon to be used as a urinary conduit.     DESCRIPTION OF PROCEDURE:  At the time I was called to the operating room, the patient had been positioned in a dorsal lithotomy position by the urology team and a midline laparotomy had been performed.  The small bowel had been mobilized from the pelvis and the retroperitoneum on the left side.  There were still some adhesions of small bowel and mesentery to the retroperitoneum on the right side that we were asked to assist with lysing. These adhesions were divided sharply, allowing complete mobilization of the small bowel and mesentery from the retroperitoneum bilaterally.  We then inspected the transverse colon, which mas markedly redundant.  The omentum was detached from the proximal two-thirds of the transverse colon and the hepatic flexure was fully mobilized.  The right colon had already been partially mobilized by the urology team, and we completed full mobilization of the right colon from the lateral peritoneal and retroperitoneal attachments.  We chose points of division on the proximal and distal transverse colon that would allow for a 25-30 cm length of transverse  colon to be used as the urinary conduit, based on a vascular pedicle supplied by the middle colic artery.  The colon was divided at both of these points with a CARMEN stapler.  The mesentery was divided with the LigaSure down to the root of the mesentery, preserving both the left and right branches of the middle colic artery as well as the main trunk of the middle colic artery within the mesenteric pedicle.  At this point, the vascularized segment of transverse colon was sufficeintly mobile to be used as a urinary conduit. We then fashioned a tension-free end-to-end hand sewn anastomosis between the proximal and distal ends of the transverse colon, using interrupted 3-0 vicryl seromuscular sutures for the outer layer and a running 3-0 PDS suture for the inner layer.  Upon completion of the anastomosis, it was palpated and noted to be widely patent.      At this point, care of the patient was turned back over to the Urology team for completion of the procedure.  Please see their operative notes for additional details.  The patient was stable through my portion of the procedure with no obvious complications.  Estimated blood loss was minimal.  I was present throughout my entire portion of the procedure.    Hammad Reynolds MD, FACS, FASCRS  Senior Staff Surgeon  Department of Colon & Rectal Surgery     This note was created using voice recognition software, and may contain some unrecognized transcriptional errors.

## 2020-09-13 NOTE — ASSESSMENT & PLAN NOTE
Ms. Riley is a 55 y.o. female with b/l ureteral obstruction s/p transverse colon conduit on 9/11/2020.     - Low res diet as tolerated  - Rest of care per primary  - Will continue to follow

## 2020-09-13 NOTE — SUBJECTIVE & OBJECTIVE
Subjective:     Interval History:   No acute events overnight.   Tmax 101.6  Denies nausea/vomiting.  3.2L of ostomy output  Having good urine output      Post-Op Info:  Procedure(s):  COLECTOMY, RIGHT Extended  CREATION, ILEOSTOMY   9 Days Post-Op      Medications:  Continuous Infusions:   sodium chloride 0.9% 40 mL/hr at 09/25/20 1113    TPN ADULT CENTRAL LINE CUSTOM 60 mL/hr at 09/25/20 2241     Scheduled Meds:   cyanocobalamin  100 mcg Intramuscular Weekly    enoxaparin  40 mg Subcutaneous Q24H    epoetin yecenia-epbx  20,000 Units Subcutaneous Q48H    loperamide  2 mg Oral BID    metoprolol tartrate  25 mg Oral BID    pantoprazole  40 mg Intravenous Daily    piperacillin-tazobactam (ZOSYN) IVPB  4.5 g Intravenous Q8H    sodium chloride 0.9%  10 mL Intravenous Q6H     PRN Meds:   acetaminophen    dextrose 50%    glucagon (human recombinant)    HYDROmorphone    insulin aspart U-100    iohexol    iohexol    labetalol    ondansetron    oxyCODONE-acetaminophen    oxyCODONE-acetaminophen    sodium chloride 0.9%    sodium chloride 0.9%    sodium chloride 0.9%        Objective:     Vital Signs (Most Recent):  Temp: 96.5 °F (35.8 °C) (09/26/20 0900)  Pulse: (!) 118 (09/26/20 0400)  Resp: 20 (09/26/20 0400)  BP: 130/63 (09/26/20 0900)  SpO2: 98 % (09/26/20 0400) Vital Signs (24h Range):  Temp:  [96.5 °F (35.8 °C)-101.6 °F (38.7 °C)] 96.5 °F (35.8 °C)  Pulse:  [106-118] 118  Resp:  [20-34] 20  SpO2:  [97 %-99 %] 98 %  BP: (121-135)/(61-84) 130/63     Intake/Output - Last 3 Shifts       09/24 0700 - 09/25 0659 09/25 0700 - 09/26 0659 09/26 0700 - 09/27 0659    P.O. 600 480     I.V. (mL/kg)  271.3 (3.1)     IV Piggyback 200 1300      891     Total Intake(mL/kg) 1001 (11.3) 2942.3 (33.1)     Urine (mL/kg/hr) 925 (0.4) 1850 (0.9)     Emesis/NG output       Drains 40 5     Other  20     Stool 2575 3180     Total Output 3540 5055     Net -4202 -6009.7                  Physical Exam  Vitals signs  and nursing note reviewed.   Constitutional:       Appearance: She is well-developed. She is not ill-appearing.   HENT:      Head: Normocephalic.   Eyes:      Pupils: Pupils are equal, round, and reactive to light.   Cardiovascular:      Rate and Rhythm: Normal rate and regular rhythm.      Heart sounds: Normal heart sounds.   Pulmonary:      Effort: Pulmonary effort is normal. No respiratory distress.      Breath sounds: Normal breath sounds. No wheezing or rales.   Abdominal:      Palpations: Abdomen is soft. There is no mass.      Tenderness: There is no guarding or rebound.      Comments: abd inc line with wound vac in place  Ileostomy functional  Urostomy functional  IR drain with minimal drainage  Skin:     General: Skin is warm and dry.   Neurological:      Mental Status: She is alert and oriented to person, place, and time.   Psychiatric:         Behavior: Behavior normal.         Thought Content: Thought content normal.         Judgment: Judgment normal.           Significant Labs:  BMP (Last 3 Results):   Recent Labs   Lab 09/24/20  0354 09/25/20  0616 09/25/20  1531 09/26/20  0520   * 156* 167* 146*    138 135* 135*   K 3.2* 4.1 3.9 4.3    108 106 109   CO2 21* 20* 20* 18*   BUN 17 23* 23* 22*   CREATININE 1.0 1.6* 1.7* 1.7*   CALCIUM 7.7* 8.0* 8.0* 7.9*   MG 2.1 2.2  --  2.2     CBC (Last 3 Results):   Recent Labs   Lab 09/25/20  0616 09/25/20  1531 09/26/20  0520   WBC 19.16* 20.70* 18.71*   RBC 2.51* 2.44* 2.37*   HGB 6.9* 6.7* 6.4*   HCT 23.0* 22.1* 21.5*   * 507* 483*   MCV 92 91 91   MCH 27.5 27.5 27.0   MCHC 30.0* 30.3* 29.8*       Significant Diagnostics:  None

## 2020-09-13 NOTE — ASSESSMENT & PLAN NOTE
- po tylenol, PO oxycodone for pain control, toradol for 3 days  - Will defer advancing diet to CRS  - 42 mL/hr MIVF  - CBC, BMP, Mg, Phos daily  - Ambulate qid  - PT/OT consult  - Perioperative ancef x24 hours  - Wound care ostomy consult  - Social work consult re: discharge planning  - DC lovenox due to drifting hgb, she is HDS  - Drains: maintain ORESTES, ureteral stents, and red rubber  - Prophylaxis: IS, SCDs, GI ppx

## 2020-09-13 NOTE — PROGRESS NOTES
[facility]  Urology  Progress Note    Patient Name: Edita Riley  MRN: [id number]  Admission Date: 9/11/2020  Hospital Length of Stay: 2 days  Code Status: Full Code   Attending Provider: Leslie Balbuena MD   Primary Care Physician: Audrey Mustafa MD    Subjective:     HPI:  Edita Riley is a 55 y.o. female with a history of cervical cancer s/p pelvic radiation. She has since developed bilateral ureteral strictures and bilateral hydronephrosis R>L. She had a MAG3 scan confirmed presence of bilateral obstruction. She is s/p open transverse colon conduit urinary diversion on 9/11/2020.     Interval History:   Doing well this morning. Pain well controlled. Worked with PT/OT, tolerating low res diet.     Review of Systems  Objective:     Temp:  [96.7 °F (35.9 °C)-98.5 °F (36.9 °C)] 98.4 °F (36.9 °C)  Pulse:  [86-94] 94  Resp:  [17-18] 18  SpO2:  [95 %-99 %] 98 %  BP: (120-147)/(59-74) 147/70     Body mass index is 28.94 kg/m².           Drains     Drain                 Nephrostomy 08/13/20 0805 Left 12 Fr. 31 days         Nephrostomy 08/13/20 0809 Right 12 Fr. 31 days         Closed/Suction Drain 09/11/20 1832 Right Abdomen Bulb 15 Fr. 1 day         Urostomy 09/12/20 0800 LLQ 1 day                Physical Exam   Constitutional: No distress.   HENT:   Head: Normocephalic and atraumatic.   Eyes: Conjunctivae are normal. No scleral icterus.   Neck: Normal range of motion.   Cardiovascular: Normal rate.    Pulmonary/Chest: Effort normal. No respiratory distress.   Abdominal: Soft. She exhibits no distension. There is abdominal tenderness (appropriate). There is no rebound and no guarding.   Urostomy p/p/p draining clear yellow urine  Ureteral stents in place  ORESTES draining ss   Musculoskeletal: Normal range of motion.      Comments: SCDs in place   Neurological: She is alert.   Skin: Skin is warm and dry. She is not diaphoretic.         Significant Labs:    BMP:  Recent Labs   Lab  09/11/20 1956 09/12/20 0400 09/13/20  0436    142 143   K 4.9 4.3 3.8   * 110 113*   CO2 15* 23 24   BUN 10 12 13   CREATININE 1.1 1.2 1.0   CALCIUM 6.9* 8.3* 7.9*       CBC:   Recent Labs   Lab 09/11/20 1956 09/12/20 0400 09/13/20  0436   WBC 13.33* 11.56 9.37   HGB 9.7* 9.4* 7.9*   HCT 33.0* 31.7* 26.4*    207 160       All pertinent labs results from the past 24 hours have been reviewed.    Significant Imaging:  All pertinent imaging results/findings from the past 24 hours have been reviewed.                  Assessment/Plan:     * Bilateral ureteral obstruction  - po tylenol, PO oxycodone for pain control, toradol for 3 days  - Will defer advancing diet to CRS  - 42 mL/hr MIVF  - CBC, BMP, Mg, Phos daily  - Ambulate qid  - PT/OT consult  - Perioperative ancef x24 hours  - Wound care ostomy consult  - Social work consult re: discharge planning  - DC lovenox due to drifting hgb, she is HDS  - Drains: maintain ORESTES, ureteral stents, and red rubber  - Prophylaxis: IS, SCDs, GI ppx            VTE Risk Mitigation (From admission, onward)         Ordered     IP VTE HIGH RISK PATIENT  Once      09/11/20 2024     Place sequential compression device  Until discontinued      09/11/20 2024     Place sequential compression device  Until discontinued      09/11/20 0509     Place sequential compression device  Until discontinued      09/11/20 0509                Julius Zamarripa MD  Urology  [facility]

## 2020-09-13 NOTE — PLAN OF CARE
Pt AAOx4; Plan of care reviewed with patient; verbalized understanding. Medications reviewed and administered as ordered. Rounding for safety and patient care per policy. Safety precautions maintained. Call light within reach, bed wheels locked, bed in lowest position, side rails ^x2, safety maintained. NADN, Will continue monitor.    - Pt laying in bed with HOB elevated 30   - Bilat nephrostomy no output  - urostomy draining pink tinged urine, 1600 mL output this shift  - ORESTES draining serosangenous fluid, 120 mL output this shift  - no acute events this shifts       Problem: Adult Inpatient Plan of Care  Goal: Plan of Care Review  Flowsheets (Taken 9/13/2020 0425)  Plan of Care Reviewed With: patient     Problem: Fall Injury Risk  Goal: Absence of Fall and Fall-Related Injury  Intervention: Promote Injury-Free Environment  Flowsheets (Taken 9/13/2020 0425)  Safety Promotion/Fall Prevention:   assistive device/personal item within reach   commode/urinal/bedpan at bedside   lighting adjusted   medications reviewed   nonskid shoes/socks when out of bed   side rails raised x 2   supervised activity   toileting scheduled   instructed to call staff for mobility  Environmental Safety Modification:   assistive device/personal items within reach   clutter free environment maintained   lighting adjusted

## 2020-09-13 NOTE — PLAN OF CARE
[facility]    HOME HEALTH ORDERS  FACE TO FACE ENCOUNTER    Patient Name: Edita Riley  YOB: 1965    PCP: Audrey Mustafa MD   PCP Address: [address] / [place] [address]  PCP Phone Number: [phone]  PCP Fax: [phone]    Encounter Date: 09/13/2020    Admit to Home Health    Diagnoses:  Active Hospital Problems    Diagnosis  POA    *Bilateral ureteral obstruction [N13.5]  Yes      Resolved Hospital Problems   No resolved problems to display.       Future Appointments   Date Time Provider Department Center   9/15/2020 10:40 AM Nadia Araiza MD [facility] PHYSMED [address]   9/24/2020  1:00 PM ADRIAN Knox [facility] BRSTSUR [address]   10/19/2020  9:45 AM Audrey Mustafa MD [facility] IM [address] [facility]   12/1/2020 10:45 AM Refugio Lemon MD [facility] GYN ONC [address]           I have seen and examined this patient face to face today. My clinical findings that support the need for the home health skilled services and home bound status are the following:  Weakness/numbness causing balance and gait disturbance due to Surgery making it taxing to leave home.    Allergies:  Review of patient's allergies indicates:   Allergen Reactions    Bee sting [allergen ext-venom-honey bee]      Rash      Grass pollen-bermuda, standard      rash       Diet: regular diet    Activities: activity as tolerated and no driving while on analgesics    Nursing:   SN to complete comprehensive assessment including routine vital signs. Instruct on disease process and s/s of complications to report to MD. Review/verify medication list sent home with the patient at time of discharge  and instruct patient/caregiver as needed. Frequency may be adjusted depending on start of care date.    Notify MD if SBP > 160 or < 90; DBP > 90 or < 50; HR > 120 or < 50; Temp > 101      CONSULTS:    Physical Therapy to evaluate and treat. Evaluate for home safety and equipment needs; Establish/upgrade  home exercise program. Perform / instruct on therapeutic exercises, gait training, transfer training, and Range of Motion.  Occupational Therapy to evaluate and treat. Evaluate home environment for safety and equipment needs. Perform/Instruct on transfers, ADL training, ROM, and therapeutic exercises.  Aide to provide assistance with personal care, ADLs, and vital signs.    MISCELLANEOUS CARE:  Colostomy Care:  Instruct patient/caregiver to empty bag when full and PRN., Change and clean site every 48 hours and Monitor skin integrity.    WOUND CARE ORDERS  n/a      Medications: Review discharge medications with patient and family and provide education.      Current Discharge Medication List      CONTINUE these medications which have NOT CHANGED    Details   dicyclomine (BENTYL) 20 mg tablet Take 1 tablet (20 mg total) by mouth 3 (three) times daily as needed.  Qty: 60 tablet, Refills: 2    Associated Diagnoses: Generalized abdominal pain      gabapentin (NEURONTIN) 300 MG capsule Take 1 capsule (300 mg total) by mouth 3 (three) times daily.  Qty: 270 capsule, Refills: 3      ketoconazole (NIZORAL) 2 % cream Apply topically once daily to affected area  Qty: 30 g, Refills: 1    Associated Diagnoses: Tinea versicolor      metroNIDAZOLE (FLAGYL) 500 MG tablet Take 1 tablet (500 mg total) by mouth As instructed. Take 1 tablet at 1pm, 2pm, 11pm the day before surgery  Qty: 3 tablet, Refills: 0    Associated Diagnoses: Bladder outlet obstruction      neomycin (MYCIFRADIN) 500 mg Tab Take 2 tablets (1,000 mg total) by mouth As instructed. Take 2 tablets at 1pm, 2pm, 11pm the day before surgery  Qty: 6 tablet, Refills: 0    Associated Diagnoses: Bladder outlet obstruction      omeprazole (PRILOSEC) 40 MG capsule Take 1 capsule (40 mg total) by mouth once daily.  Qty: 90 capsule, Refills: 3    Associated Diagnoses: Gastroesophageal reflux disease with esophagitis      oxyCODONE (ROXICODONE) 5 MG immediate release tablet Take 1  tablet (5 mg total) by mouth every 8 (eight) hours as needed for Pain.  Qty: 10 tablet, Refills: 0    Comments: Quantity prescribed more than 7 day supply? No      polyethylene glycol (GLYCOLAX) 17 gram/dose powder Take 17 g by mouth once daily.  Qty: 510 g, Refills: 11    Associated Diagnoses: Generalized abdominal pain      traZODone (DESYREL) 50 MG tablet TAKE 1 TABLET (50 MG TOTAL) BY MOUTH EVERY EVENING.  Qty: 30 tablet, Refills: 11    Associated Diagnoses: Primary insomnia      ondansetron (ZOFRAN-ODT) 4 MG TbDL Take 1 tablet (4 mg total) by mouth every 8 (eight) hours as needed (nausea or vomiting).  Qty: 20 tablet, Refills: 2             I certify that this patient is confined to her home and needs intermittent skilled nursing care, physical therapy and occupational therapy.

## 2020-09-13 NOTE — PLAN OF CARE
Pt AAOx4, ORESTES drain intact and draining serosanguinous fluid, urostomy draing tea colored urine, bi lat nephrostomy tubes w/ no output, pt  had x1 episode of N/V w/ unwitnessed emesis which resolved, pt ambulated halls and to bathroom to have x1 BM, no falls, WCTM.   Problem: Adult Inpatient Plan of Care  Goal: Plan of Care Review  Outcome: Ongoing, Progressing  Flowsheets (Taken 9/13/2020 1806)  Plan of Care Reviewed With: patient     Problem: Fall Injury Risk  Goal: Absence of Fall and Fall-Related Injury  Outcome: Ongoing, Progressing  Intervention: Identify and Manage Contributors to Fall Injury Risk  Flowsheets (Taken 9/13/2020 1806)  Self-Care Promotion:   independence encouraged   BADL personal objects within reach   meal setup provided   safe use of adaptive equipment encouraged  Medication Review/Management: medications reviewed

## 2020-09-13 NOTE — SUBJECTIVE & OBJECTIVE
Subjective:     Interval History:   No acute events overnight.  Passing flatus, no BM yet.  Tolerating diet, denies nausea.    Post-Op Info:  Procedure(s) (LRB):  MOBILIZATION, COLONIC (N/A)  CREATION,CONDUIT,TRANSVERSE COLON (N/A)   2 Days Post-Op      Medications:  Continuous Infusions:   sodium chloride 0.9% 84 mL/hr at 09/12/20 1615     Scheduled Meds:   acetaminophen  1,000 mg Oral Q6H    famotidine (PF)  20 mg Intravenous BID    ketorolac  15 mg Intravenous Q6H    pregabalin  150 mg Oral QHS     PRN Meds:   HYDROmorphone    methocarbamoL    oxyCODONE    oxyCODONE    sodium chloride 0.9%    sodium chloride 0.9%        Objective:     Vital Signs (Most Recent):  Temp: 97.5 °F (36.4 °C) (09/13/20 0451)  Pulse: 89 (09/13/20 0451)  Resp: 17 (09/13/20 0451)  BP: 135/74 (09/13/20 0451)  SpO2: 97 % (09/13/20 0451) Vital Signs (24h Range):  Temp:  [96.7 °F (35.9 °C)-98.5 °F (36.9 °C)] 97.5 °F (36.4 °C)  Pulse:  [73-90] 89  Resp:  [17-18] 17  SpO2:  [95 %-99 %] 97 %  BP: (117-142)/(57-74) 135/74     Intake/Output - Last 3 Shifts       09/11 0700 - 09/12 0659 09/12 0700 - 09/13 0659 09/13 0700 - 09/14 0659    P.O. 120 600     I.V. (mL/kg) 6195.8 (69.7)      IV Piggyback 200      Total Intake(mL/kg) 6515.8 (73.3) 600 (6.7)     Urine (mL/kg/hr) 1450 (0.7) 3500 (1.6)     Drains 120 180     Blood 300      Total Output 1870 3680     Net +4645.8 -3080                  Physical Exam  Constitutional:       General: She is not in acute distress.  HENT:      Head: Normocephalic and atraumatic.      Right Ear: External ear normal.      Left Ear: External ear normal.      Nose: Nose normal.      Mouth/Throat:      Mouth: Mucous membranes are moist.   Eyes:      Extraocular Movements: Extraocular movements intact.      Conjunctiva/sclera: Conjunctivae normal.      Pupils: Pupils are equal, round, and reactive to light.   Neck:      Musculoskeletal: Normal range of motion.   Cardiovascular:      Rate and Rhythm: Normal  rate and regular rhythm.      Pulses: Normal pulses.      Heart sounds: Normal heart sounds.   Pulmonary:      Effort: Pulmonary effort is normal.      Breath sounds: Normal breath sounds.   Abdominal:      General: There is no distension.      Palpations: Abdomen is soft.      Comments: Appropriate tenderness   Musculoskeletal: Normal range of motion.   Skin:     General: Skin is warm and dry.   Neurological:      General: No focal deficit present.      Mental Status: She is alert and oriented to person, place, and time.   Psychiatric:         Mood and Affect: Mood normal.         Behavior: Behavior normal.         Significant Labs:  BMP (Last 3 Results):   Recent Labs   Lab 09/11/20 1956 09/12/20 0400 09/13/20 0436   * 130* 123*    142 143   K 4.9 4.3 3.8   * 110 113*   CO2 15* 23 24   BUN 10 12 13   CREATININE 1.1 1.2 1.0   CALCIUM 6.9* 8.3* 7.9*   MG 2.1 1.9 1.8     CBC (Last 3 Results):   Recent Labs   Lab 09/11/20 1956 09/12/20 0400 09/13/20 0436   WBC 13.33* 11.56 9.37   RBC 3.66* 3.52* 2.92*   HGB 9.7* 9.4* 7.9*   HCT 33.0* 31.7* 26.4*    207 160   MCV 90 90 90   MCH 26.5* 26.7* 27.1   MCHC 29.4* 29.7* 29.9*

## 2020-09-13 NOTE — PROGRESS NOTES
[facility]  Colorectal Surgery  Progress Note    Patient Name: Edita Riley  MRN: [id number]  Admission Date: 9/11/2020  Hospital Length of Stay: 2 days  Attending Physician: Leslie Balbuena MD    Subjective:     Interval History:   No acute events overnight.  Passing flatus, no BM yet.  Tolerating diet, denies nausea.    Post-Op Info:  Procedure(s) (LRB):  MOBILIZATION, COLONIC (N/A)  CREATION,CONDUIT,TRANSVERSE COLON (N/A)   2 Days Post-Op      Medications:  Continuous Infusions:   sodium chloride 0.9% 84 mL/hr at 09/12/20 1615     Scheduled Meds:   acetaminophen  1,000 mg Oral Q6H    famotidine (PF)  20 mg Intravenous BID    ketorolac  15 mg Intravenous Q6H    pregabalin  150 mg Oral QHS     PRN Meds:   HYDROmorphone    methocarbamoL    oxyCODONE    oxyCODONE    sodium chloride 0.9%    sodium chloride 0.9%        Objective:     Vital Signs (Most Recent):  Temp: 97.5 °F (36.4 °C) (09/13/20 0451)  Pulse: 89 (09/13/20 0451)  Resp: 17 (09/13/20 0451)  BP: 135/74 (09/13/20 0451)  SpO2: 97 % (09/13/20 0451) Vital Signs (24h Range):  Temp:  [96.7 °F (35.9 °C)-98.5 °F (36.9 °C)] 97.5 °F (36.4 °C)  Pulse:  [73-90] 89  Resp:  [17-18] 17  SpO2:  [95 %-99 %] 97 %  BP: (117-142)/(57-74) 135/74     Intake/Output - Last 3 Shifts       09/11 0700 - 09/12 0659 09/12 0700 - 09/13 0659 09/13 0700 - 09/14 0659    P.O. 120 600     I.V. (mL/kg) 6195.8 (69.7)      IV Piggyback 200      Total Intake(mL/kg) 6515.8 (73.3) 600 (6.7)     Urine (mL/kg/hr) 1450 (0.7) 3500 (1.6)     Drains 120 180     Blood 300      Total Output 1870 3680     Net +4645.8 -3080                  Physical Exam  Constitutional:       General: She is not in acute distress.  HENT:      Head: Normocephalic and atraumatic.      Right Ear: External ear normal.      Left Ear: External ear normal.      Nose: Nose normal.      Mouth/Throat:      Mouth: Mucous membranes are moist.   Eyes:      Extraocular Movements: Extraocular  movements intact.      Conjunctiva/sclera: Conjunctivae normal.      Pupils: Pupils are equal, round, and reactive to light.   Neck:      Musculoskeletal: Normal range of motion.   Cardiovascular:      Rate and Rhythm: Normal rate and regular rhythm.      Pulses: Normal pulses.      Heart sounds: Normal heart sounds.   Pulmonary:      Effort: Pulmonary effort is normal.      Breath sounds: Normal breath sounds.   Abdominal:      General: There is no distension.      Palpations: Abdomen is soft.      Comments: Appropriate tenderness   Musculoskeletal: Normal range of motion.   Skin:     General: Skin is warm and dry.   Neurological:      General: No focal deficit present.      Mental Status: She is alert and oriented to person, place, and time.   Psychiatric:         Mood and Affect: Mood normal.         Behavior: Behavior normal.         Significant Labs:  BMP (Last 3 Results):   Recent Labs   Lab 09/11/20 1956 09/12/20 0400 09/13/20  0436   * 130* 123*    142 143   K 4.9 4.3 3.8   * 110 113*   CO2 15* 23 24   BUN 10 12 13   CREATININE 1.1 1.2 1.0   CALCIUM 6.9* 8.3* 7.9*   MG 2.1 1.9 1.8     CBC (Last 3 Results):   Recent Labs   Lab 09/11/20 1956 09/12/20 0400 09/13/20  0436   WBC 13.33* 11.56 9.37   RBC 3.66* 3.52* 2.92*   HGB 9.7* 9.4* 7.9*   HCT 33.0* 31.7* 26.4*    207 160   MCV 90 90 90   MCH 26.5* 26.7* 27.1   MCHC 29.4* 29.7* 29.9*         Assessment/Plan:     * Bilateral ureteral obstruction  Ms. Riley is a 55 y.o. female with b/l ureteral obstruction s/p transverse colon conduit on 9/11/2020.     - Low res diet as tolerated  - Rest of care per primary  - Will continue to follow           Cecile Francisco MD  Colorectal Surgery  [facility]

## 2020-09-14 LAB
ANION GAP SERPL CALC-SCNC: 11 MMOL/L (ref 8–16)
BASOPHILS # BLD AUTO: 0.04 K/UL (ref 0–0.2)
BASOPHILS NFR BLD: 0.4 % (ref 0–1.9)
BUN SERPL-MCNC: 10 MG/DL (ref 6–20)
CALCIUM SERPL-MCNC: 8.3 MG/DL (ref 8.7–10.5)
CHLORIDE SERPL-SCNC: 110 MMOL/L (ref 95–110)
CO2 SERPL-SCNC: 20 MMOL/L (ref 23–29)
CREAT SERPL-MCNC: 0.8 MG/DL (ref 0.5–1.4)
DIFFERENTIAL METHOD: ABNORMAL
EOSINOPHIL # BLD AUTO: 0.3 K/UL (ref 0–0.5)
EOSINOPHIL NFR BLD: 3.3 % (ref 0–8)
ERYTHROCYTE [DISTWIDTH] IN BLOOD BY AUTOMATED COUNT: 16.4 % (ref 11.5–14.5)
EST. GFR  (AFRICAN AMERICAN): >60 ML/MIN/1.73 M^2
EST. GFR  (NON AFRICAN AMERICAN): >60 ML/MIN/1.73 M^2
GLUCOSE SERPL-MCNC: 87 MG/DL (ref 70–110)
HCT VFR BLD AUTO: 26.8 % (ref 37–48.5)
HGB BLD-MCNC: 8.1 G/DL (ref 12–16)
IMM GRANULOCYTES # BLD AUTO: 0.05 K/UL (ref 0–0.04)
IMM GRANULOCYTES NFR BLD AUTO: 0.5 % (ref 0–0.5)
LYMPHOCYTES # BLD AUTO: 1.6 K/UL (ref 1–4.8)
LYMPHOCYTES NFR BLD: 15.3 % (ref 18–48)
MAGNESIUM SERPL-MCNC: 1.6 MG/DL (ref 1.6–2.6)
MCH RBC QN AUTO: 27.3 PG (ref 27–31)
MCHC RBC AUTO-ENTMCNC: 30.2 G/DL (ref 32–36)
MCV RBC AUTO: 90 FL (ref 82–98)
MONOCYTES # BLD AUTO: 0.9 K/UL (ref 0.3–1)
MONOCYTES NFR BLD: 8.8 % (ref 4–15)
NEUTROPHILS # BLD AUTO: 7.5 K/UL (ref 1.8–7.7)
NEUTROPHILS NFR BLD: 71.7 % (ref 38–73)
NRBC BLD-RTO: 0 /100 WBC
PHOSPHATE SERPL-MCNC: 3.5 MG/DL (ref 2.7–4.5)
PLATELET # BLD AUTO: 227 K/UL (ref 150–350)
PMV BLD AUTO: 10.8 FL (ref 9.2–12.9)
POTASSIUM SERPL-SCNC: 3.7 MMOL/L (ref 3.5–5.1)
RBC # BLD AUTO: 2.97 M/UL (ref 4–5.4)
SODIUM SERPL-SCNC: 141 MMOL/L (ref 136–145)
WBC # BLD AUTO: 10.46 K/UL (ref 3.9–12.7)

## 2020-09-14 PROCEDURE — 25000003 PHARM REV CODE 250: Performed by: STUDENT IN AN ORGANIZED HEALTH CARE EDUCATION/TRAINING PROGRAM

## 2020-09-14 PROCEDURE — 27000616 HC UROSTOMY BAG

## 2020-09-14 PROCEDURE — 84100 ASSAY OF PHOSPHORUS: CPT

## 2020-09-14 PROCEDURE — 94799 UNLISTED PULMONARY SVC/PX: CPT

## 2020-09-14 PROCEDURE — 80048 BASIC METABOLIC PNL TOTAL CA: CPT

## 2020-09-14 PROCEDURE — 82570 ASSAY OF URINE CREATININE: CPT

## 2020-09-14 PROCEDURE — 85025 COMPLETE CBC W/AUTO DIFF WBC: CPT

## 2020-09-14 PROCEDURE — 83735 ASSAY OF MAGNESIUM: CPT

## 2020-09-14 PROCEDURE — 99900035 HC TECH TIME PER 15 MIN (STAT)

## 2020-09-14 PROCEDURE — 11000001 HC ACUTE MED/SURG PRIVATE ROOM

## 2020-09-14 PROCEDURE — 36415 COLL VENOUS BLD VENIPUNCTURE: CPT

## 2020-09-14 PROCEDURE — 63600175 PHARM REV CODE 636 W HCPCS: Performed by: STUDENT IN AN ORGANIZED HEALTH CARE EDUCATION/TRAINING PROGRAM

## 2020-09-14 RX ORDER — ONDANSETRON 4 MG/1
4 TABLET, FILM COATED ORAL EVERY 8 HOURS PRN
Status: DISCONTINUED | OUTPATIENT
Start: 2020-09-14 | End: 2020-09-18

## 2020-09-14 RX ADMIN — ACETAMINOPHEN 1000 MG: 325 TABLET ORAL at 12:09

## 2020-09-14 RX ADMIN — KETOROLAC TROMETHAMINE 15 MG: 30 INJECTION, SOLUTION INTRAMUSCULAR at 12:09

## 2020-09-14 RX ADMIN — FAMOTIDINE 20 MG: 20 TABLET, FILM COATED ORAL at 10:09

## 2020-09-14 RX ADMIN — ACETAMINOPHEN 1000 MG: 325 TABLET ORAL at 05:09

## 2020-09-14 RX ADMIN — FAMOTIDINE 20 MG: 20 TABLET, FILM COATED ORAL at 08:09

## 2020-09-14 RX ADMIN — PREGABALIN 150 MG: 50 CAPSULE ORAL at 10:09

## 2020-09-14 RX ADMIN — ONDANSETRON HYDROCHLORIDE 4 MG: 4 TABLET, FILM COATED ORAL at 01:09

## 2020-09-14 RX ADMIN — POTASSIUM & SODIUM PHOSPHATES POWDER PACK 280-160-250 MG 1 PACKET: 280-160-250 PACK at 12:09

## 2020-09-14 RX ADMIN — KETOROLAC TROMETHAMINE 15 MG: 30 INJECTION, SOLUTION INTRAMUSCULAR at 05:09

## 2020-09-14 RX ADMIN — ACETAMINOPHEN 1000 MG: 325 TABLET ORAL at 06:09

## 2020-09-14 RX ADMIN — KETOROLAC TROMETHAMINE 15 MG: 30 INJECTION, SOLUTION INTRAMUSCULAR at 06:09

## 2020-09-14 RX ADMIN — POTASSIUM & SODIUM PHOSPHATES POWDER PACK 280-160-250 MG 1 PACKET: 280-160-250 PACK at 06:09

## 2020-09-14 NOTE — PLAN OF CARE
Patient resting in bed comfortably. iv intact and infusing free of irration, fall precautions maintained no falls noted. Call light in reach bed locked and in lowest position. Non skid socks on while out of bed. Patient instructed to call for assistance. Skin integrity maintained as patient is independent with frequent positioning, C/o pain managed with PRN meds, pt using I.S, scds on, drains intact, No other complaints or concerns.  Will continue to monitor and follow careplan of care.

## 2020-09-14 NOTE — ASSESSMENT & PLAN NOTE
- po tylenol, PO oxycodone for pain control, toradol for 3 days - complete  - Will defer advancing diet to CRS - LRD  - HLIV  - CBC, BMP, Mg, Phos daily  - Ambulate qid  - PT/OT on board  - Perioperative ancef x24 hours - completed   - Wound care ostomy consult  - Social work consult re: discharge planning  - DC lovenox due to drifting hgb, stable today, she is HDS  - Drains: maintain ORESTES, ureteral stents, and red rubber  - Prophylaxis: IS, SCDs, GI ppx    Dispo: Tentative DC tomorrow

## 2020-09-14 NOTE — SUBJECTIVE & OBJECTIVE
Interval History:   Had an episode of emesis after eating yesterday, but otherwise has been doing well - ambulating, pain controlled, passing flatus and had an episode of diarrhea.     Review of Systems    Objective:     Temp:  [98 °F (36.7 °C)-98.7 °F (37.1 °C)] 98.7 °F (37.1 °C)  Pulse:  [88-98] 94  Resp:  [15-18] 15  SpO2:  [95 %-99 %] 99 %  BP: (138-158)/(70-98) 138/86     Body mass index is 28.94 kg/m².           Drains     Drain                 Nephrostomy 08/13/20 0805 Left 12 Fr. 31 days         Nephrostomy 08/13/20 0809 Right 12 Fr. 31 days         Closed/Suction Drain 09/11/20 1832 Right Abdomen Bulb 15 Fr. 1 day         Urostomy 09/12/20 0800 LLQ 1 day                Physical Exam   Constitutional: No distress.   HENT:   Head: Normocephalic and atraumatic.   Eyes: Conjunctivae are normal. No scleral icterus.   Neck: Normal range of motion.   Cardiovascular: Normal rate.    Pulmonary/Chest: Effort normal. No respiratory distress.   Abdominal: Soft. She exhibits no distension. There is abdominal tenderness (appropriate). There is no rebound and no guarding.   Urostomy p/p/p draining clear yellow urine  Ureteral stents in place  ORESTES draining ss   Musculoskeletal: Normal range of motion.      Comments: SCDs in place   Neurological: She is alert.   Skin: Skin is warm and dry. She is not diaphoretic.         Significant Labs:    BMP:  Recent Labs   Lab 09/11/20 1956 09/12/20 0400 09/13/20  0436    142 143   K 4.9 4.3 3.8   * 110 113*   CO2 15* 23 24   BUN 10 12 13   CREATININE 1.1 1.2 1.0   CALCIUM 6.9* 8.3* 7.9*       CBC:   Recent Labs   Lab 09/11/20 1956 09/12/20 0400 09/13/20  0436   WBC 13.33* 11.56 9.37   HGB 9.7* 9.4* 7.9*   HCT 33.0* 31.7* 26.4*    207 160       All pertinent labs results from the past 24 hours have been reviewed.    Significant Imaging:  All pertinent imaging results/findings from the past 24 hours have been reviewed.

## 2020-09-14 NOTE — PLAN OF CARE
[facility]    HOME HEALTH ORDERS  FACE TO FACE ENCOUNTER    Patient Name: Edita Riley  YOB: 1965    PCP: Audrey Mustafa MD   PCP Address: [address] / [place] [address]  PCP Phone Number: [phone]  PCP Fax: [phone]    Encounter Date: 09/14/2020    Admit to Home Health    Diagnoses:  Active Hospital Problems    Diagnosis  POA    *Bilateral ureteral obstruction [N13.5]  Yes      Resolved Hospital Problems   No resolved problems to display.       Future Appointments   Date Time Provider Department Center   9/15/2020 10:40 AM Nadia Araiza MD [facility] PHYSMED [address]   9/24/2020  1:00 PM ADRIAN Knox [facility] BRSTSUR [address]   10/19/2020  9:45 AM Audrey Mustafa MD [facility] IM [address] PCW   12/1/2020 10:45 AM Refugio Lemon MD [facility] GYN ONC [address]           I have seen and examined this patient face to face today. My clinical findings that support the need for the home health skilled services and home bound status are the following:  Weakness/numbness causing balance and gait disturbance due to Surgery making it taxing to leave home.    Allergies:  Review of patient's allergies indicates:   Allergen Reactions    Bee sting [allergen ext-venom-honey bee]      Rash      Grass pollen-bermuda, standard      rash       Diet: regular diet    Activities: activity as tolerated    Nursing:   SN to complete comprehensive assessment including routine vital signs. Instruct on disease process and s/s of complications to report to MD. Review/verify medication list sent home with the patient at time of discharge  and instruct patient/caregiver as needed. Frequency may be adjusted depending on start of care date.    Notify MD if SBP > 160 or < 90; DBP > 90 or < 50; HR > 120 or < 50; Temp > 101;       CONSULTS:    Physical Therapy to evaluate and treat. Evaluate for home safety and equipment needs; Establish/upgrade home exercise program. Perform /  instruct on therapeutic exercises, gait training, transfer training, and Range of Motion.  Occupational Therapy to evaluate and treat. Evaluate home environment for safety and equipment needs. Perform/Instruct on transfers, ADL training, ROM, and therapeutic exercises.    MISCELLANEOUS CARE:  Urostomy Care:  Instruct patient/caregiver to empty bag when full and PRN., Change and clean site every 48 hours and Monitor skin integrity.    Medications: Review discharge medications with patient and family and provide education.      Current Discharge Medication List      CONTINUE these medications which have NOT CHANGED    Details   dicyclomine (BENTYL) 20 mg tablet Take 1 tablet (20 mg total) by mouth 3 (three) times daily as needed.  Qty: 60 tablet, Refills: 2    Associated Diagnoses: Generalized abdominal pain      gabapentin (NEURONTIN) 300 MG capsule Take 1 capsule (300 mg total) by mouth 3 (three) times daily.  Qty: 270 capsule, Refills: 3      ketoconazole (NIZORAL) 2 % cream Apply topically once daily to affected area  Qty: 30 g, Refills: 1    Associated Diagnoses: Tinea versicolor      metroNIDAZOLE (FLAGYL) 500 MG tablet Take 1 tablet (500 mg total) by mouth As instructed. Take 1 tablet at 1pm, 2pm, 11pm the day before surgery  Qty: 3 tablet, Refills: 0    Associated Diagnoses: Bladder outlet obstruction      neomycin (MYCIFRADIN) 500 mg Tab Take 2 tablets (1,000 mg total) by mouth As instructed. Take 2 tablets at 1pm, 2pm, 11pm the day before surgery  Qty: 6 tablet, Refills: 0    Associated Diagnoses: Bladder outlet obstruction      omeprazole (PRILOSEC) 40 MG capsule Take 1 capsule (40 mg total) by mouth once daily.  Qty: 90 capsule, Refills: 3    Associated Diagnoses: Gastroesophageal reflux disease with esophagitis      oxyCODONE (ROXICODONE) 5 MG immediate release tablet Take 1 tablet (5 mg total) by mouth every 8 (eight) hours as needed for Pain.  Qty: 10 tablet, Refills: 0    Comments: Quantity prescribed  more than 7 day supply? No      polyethylene glycol (GLYCOLAX) 17 gram/dose powder Take 17 g by mouth once daily.  Qty: 510 g, Refills: 11    Associated Diagnoses: Generalized abdominal pain      traZODone (DESYREL) 50 MG tablet TAKE 1 TABLET (50 MG TOTAL) BY MOUTH EVERY EVENING.  Qty: 30 tablet, Refills: 11    Associated Diagnoses: Primary insomnia      ondansetron (ZOFRAN-ODT) 4 MG TbDL Take 1 tablet (4 mg total) by mouth every 8 (eight) hours as needed (nausea or vomiting).  Qty: 20 tablet, Refills: 2             I certify that this patient is confined to her home and needs intermittent skilled nursing care, physical therapy and occupational therapy.

## 2020-09-14 NOTE — CONSULTS
Ostomy care consult received from nursing for new urostomy. Patient has history of bladder outlet obstruction due to prior pelvic radiation therapy for cervical cancer with transverse colon conduit creation on 9/11/20.  Met with patient and discussed goals of education to include stoma and peristomal care, obtaining supplies, pouch emptying and using drainage bags.     Patient with small amount of urine in pouch. Stoma size 35 mm. Discussed and demonstrated cutting urostomy pouch and emptying pouch. Patient return demonstration with difficulty and max assistance. Patient had difficulty using scissors to cut ostomy pouch. Patient was not engaged in lesson and not teachable at this time. Provided reading materials regarding today's training and will follow up with patient to review lesson. Offered patient to have any friend or family to attend next training session, pt stated that she was not interested in that and family would not be available to come to the hospital.     Nursing and MD team notified regarding today's training.  Wound care to continue to follow pt PRN [id number]    Stoma size: 35 mm with sutured stents in place.

## 2020-09-14 NOTE — PLAN OF CARE
Spoke with patient to discuss d/c planning assessment. Patient lives with spouse and daughter. Family to provide transportation home at discharge. Patient Independent with RW. PCP and Pharmacy verified. Pt/Ot recommending HH therapy for patient. Unable to obtain as Physical and occupational therapy at home is not a covered service through medicaid. Patient provided with prescription for Outpatient Pt/ot at discharge. 21 HH referrals sent to staff a nurse for patient secondary to new urostomy.  12 denials at present. Will continue to follow for needs.   09/14/20 1520   Discharge Assessment   Assessment Type Discharge Planning Assessment   Confirmed/corrected address and phone number on facesheet? Yes   Assessment information obtained from? Patient   Expected Length of Stay (days) 4   Communicated expected length of stay with patient/caregiver yes   Prior to hospitilization cognitive status: Alert/Oriented   Prior to hospitalization functional status: Independent;Assistive Equipment   Current cognitive status: Alert/Oriented   Current Functional Status: Independent;Assistive Equipment   Facility Arrived From: Home   Lives With spouse;child(abigail), adult   Able to Return to Prior Arrangements yes   Is patient able to care for self after discharge? Yes   Who are your caregiver(s) and their phone number(s)? Hammad Riley (Spouse) [phone]   Patient's perception of discharge disposition home or selfcare   Readmission Within the Last 30 Days no previous admission in last 30 days   Patient currently being followed by outpatient case management? No   Patient currently receives any other outside agency services? No   Equipment Currently Used at Home none   Do you have any problems affording any of your prescribed medications? No   Is the patient taking medications as prescribed? yes   Does the patient have transportation home? Yes   Transportation Anticipated family or friend will provide   Does the patient receive  services at the [facility]? No   Discharge Plan A Home with family;Home Health   Discharge Plan B Home with family   DME Needed Upon Discharge  none   Patient/Family in Agreement with Plan yes

## 2020-09-14 NOTE — PROGRESS NOTES
[facility]  Urology  Progress Note    Patient Name: Edita Riley  MRN: [id number]  Admission Date: 9/11/2020  Hospital Length of Stay: 3 days  Code Status: Full Code   Attending Provider: Leslie Balbuena MD   Primary Care Physician: Audrey Mustafa MD    Subjective:     HPI:  Edita Riley is a 55 y.o. female with a history of cervical cancer s/p pelvic radiation. She has since developed bilateral ureteral strictures and bilateral hydronephrosis R>L. She had a MAG3 scan confirmed presence of bilateral obstruction. She is s/p open transverse colon conduit urinary diversion on 9/11/2020.     Interval History:   Had an episode of emesis after eating yesterday, but otherwise has been doing well - ambulating, pain controlled, passing flatus and had an episode of diarrhea.     Review of Systems    Objective:     Temp:  [98 °F (36.7 °C)-98.7 °F (37.1 °C)] 98.7 °F (37.1 °C)  Pulse:  [88-98] 94  Resp:  [15-18] 15  SpO2:  [95 %-99 %] 99 %  BP: (138-158)/(70-98) 138/86     Body mass index is 28.94 kg/m².           Drains     Drain                 Nephrostomy 08/13/20 0805 Left 12 Fr. 31 days         Nephrostomy 08/13/20 0809 Right 12 Fr. 31 days         Closed/Suction Drain 09/11/20 1832 Right Abdomen Bulb 15 Fr. 1 day         Urostomy 09/12/20 0800 LLQ 1 day                Physical Exam   Constitutional: No distress.   HENT:   Head: Normocephalic and atraumatic.   Eyes: Conjunctivae are normal. No scleral icterus.   Neck: Normal range of motion.   Cardiovascular: Normal rate.    Pulmonary/Chest: Effort normal. No respiratory distress.   Abdominal: Soft. She exhibits no distension. There is abdominal tenderness (appropriate). There is no rebound and no guarding.   Urostomy p/p/p draining clear yellow urine  Ureteral stents in place  ORESTES draining ss   Musculoskeletal: Normal range of motion.      Comments: SCDs in place   Neurological: She is alert.   Skin: Skin is warm and dry. She is  not diaphoretic.         Significant Labs:    BMP:  Recent Labs   Lab 09/11/20 1956 09/12/20 0400 09/13/20  0436    142 143   K 4.9 4.3 3.8   * 110 113*   CO2 15* 23 24   BUN 10 12 13   CREATININE 1.1 1.2 1.0   CALCIUM 6.9* 8.3* 7.9*       CBC:   Recent Labs   Lab 09/11/20 1956 09/12/20 0400 09/13/20  0436   WBC 13.33* 11.56 9.37   HGB 9.7* 9.4* 7.9*   HCT 33.0* 31.7* 26.4*    207 160       All pertinent labs results from the past 24 hours have been reviewed.    Significant Imaging:  All pertinent imaging results/findings from the past 24 hours have been reviewed.                  Assessment/Plan:     * Bilateral ureteral obstruction  - po tylenol, PO oxycodone for pain control, toradol for 3 days - complete  - Will defer advancing diet to CRS - LRD  - HLIV  - CBC, BMP, Mg, Phos daily  - Ambulate qid  - PT/OT on board  - Perioperative ancef x24 hours - completed   - Wound care ostomy consult  - Social work consult re: discharge planning  - DC lovenox due to drifting hgb, stable today, she is HDS  - Drains: maintain ORESTES, ureteral stents, and red rubber  - Prophylaxis: IS, SCDs, GI ppx    Dispo: Tentative DC tomorrow             VTE Risk Mitigation (From admission, onward)         Ordered     IP VTE HIGH RISK PATIENT  Once      09/11/20 2024     Place sequential compression device  Until discontinued      09/11/20 2024     Place sequential compression device  Until discontinued      09/11/20 0509     Place sequential compression device  Until discontinued      09/11/20 0509                Mana Wilks MD  Urology  [facility]

## 2020-09-14 NOTE — PT/OT/SLP PROGRESS
Physical Therapy      Patient Name:  Edita Hardin [facility]   MRN:  [id number]    Attempted to see patient for PT; however patient with wound care.  Unable to return later in day. Physical therapy will follow-up with patient at next scheduled visit.    Fermín Davila, PT

## 2020-09-14 NOTE — PLAN OF CARE
Patient declined for Hh services from 5 out of 6 agencies that referrals were sent to. Blast referrals sent to 16 additional agencies in an attempt to staff HH for patient prior to discharge tentatively planned for on 9/15/20.

## 2020-09-14 NOTE — PLAN OF CARE
09/14/20 1119   Post-Acute Status   Post-Acute Authorization Home Health   Home Health Status Referrals Sent     SW faxed a referral blast to [facility], [facility], [facility] and [facility] via  for review. SW will follow up as needed.    Kira Dawkins, DELPHINE  Case Management   [facility]   Ext. [phone]

## 2020-09-15 LAB
ANION GAP SERPL CALC-SCNC: 11 MMOL/L (ref 8–16)
ANISOCYTOSIS BLD QL SMEAR: SLIGHT
BASOPHILS # BLD AUTO: 0 K/UL (ref 0–0.2)
BASOPHILS NFR BLD: 0 % (ref 0–1.9)
BODY FLUID SOURCE, CREATININE: NORMAL
BUN SERPL-MCNC: 12 MG/DL (ref 6–20)
CALCIUM SERPL-MCNC: 8.7 MG/DL (ref 8.7–10.5)
CHLORIDE SERPL-SCNC: 107 MMOL/L (ref 95–110)
CO2 SERPL-SCNC: 24 MMOL/L (ref 23–29)
CREAT FLD-MCNC: 0.8 MG/DL
CREAT SERPL-MCNC: 0.9 MG/DL (ref 0.5–1.4)
DIFFERENTIAL METHOD: ABNORMAL
EOSINOPHIL # BLD AUTO: 0.1 K/UL (ref 0–0.5)
EOSINOPHIL NFR BLD: 1.6 % (ref 0–8)
ERYTHROCYTE [DISTWIDTH] IN BLOOD BY AUTOMATED COUNT: 16.3 % (ref 11.5–14.5)
EST. GFR  (AFRICAN AMERICAN): >60 ML/MIN/1.73 M^2
EST. GFR  (NON AFRICAN AMERICAN): >60 ML/MIN/1.73 M^2
GLUCOSE SERPL-MCNC: 108 MG/DL (ref 70–110)
HCT VFR BLD AUTO: 30.9 % (ref 37–48.5)
HGB BLD-MCNC: 9.2 G/DL (ref 12–16)
HYPOCHROMIA BLD QL SMEAR: ABNORMAL
IMM GRANULOCYTES # BLD AUTO: 0.01 K/UL (ref 0–0.04)
IMM GRANULOCYTES NFR BLD AUTO: 0.2 % (ref 0–0.5)
LYMPHOCYTES # BLD AUTO: 0.6 K/UL (ref 1–4.8)
LYMPHOCYTES NFR BLD: 11.2 % (ref 18–48)
MAGNESIUM SERPL-MCNC: 1.6 MG/DL (ref 1.6–2.6)
MCH RBC QN AUTO: 26.6 PG (ref 27–31)
MCHC RBC AUTO-ENTMCNC: 29.8 G/DL (ref 32–36)
MCV RBC AUTO: 89 FL (ref 82–98)
MONOCYTES # BLD AUTO: 0.4 K/UL (ref 0.3–1)
MONOCYTES NFR BLD: 7.4 % (ref 4–15)
NEUTROPHILS # BLD AUTO: 4 K/UL (ref 1.8–7.7)
NEUTROPHILS NFR BLD: 79.6 % (ref 38–73)
NRBC BLD-RTO: 0 /100 WBC
OVALOCYTES BLD QL SMEAR: ABNORMAL
PHOSPHATE SERPL-MCNC: 3.3 MG/DL (ref 2.7–4.5)
PLATELET # BLD AUTO: 260 K/UL (ref 150–350)
PMV BLD AUTO: 10.4 FL (ref 9.2–12.9)
POIKILOCYTOSIS BLD QL SMEAR: SLIGHT
POLYCHROMASIA BLD QL SMEAR: ABNORMAL
POTASSIUM SERPL-SCNC: 3.7 MMOL/L (ref 3.5–5.1)
RBC # BLD AUTO: 3.46 M/UL (ref 4–5.4)
SODIUM SERPL-SCNC: 142 MMOL/L (ref 136–145)
WBC # BLD AUTO: 5 K/UL (ref 3.9–12.7)

## 2020-09-15 PROCEDURE — 85025 COMPLETE CBC W/AUTO DIFF WBC: CPT

## 2020-09-15 PROCEDURE — 25000003 PHARM REV CODE 250: Performed by: STUDENT IN AN ORGANIZED HEALTH CARE EDUCATION/TRAINING PROGRAM

## 2020-09-15 PROCEDURE — 83735 ASSAY OF MAGNESIUM: CPT

## 2020-09-15 PROCEDURE — 93010 ELECTROCARDIOGRAM REPORT: CPT | Mod: ,,, | Performed by: INTERNAL MEDICINE

## 2020-09-15 PROCEDURE — 36415 COLL VENOUS BLD VENIPUNCTURE: CPT

## 2020-09-15 PROCEDURE — 93005 ELECTROCARDIOGRAM TRACING: CPT

## 2020-09-15 PROCEDURE — 11000001 HC ACUTE MED/SURG PRIVATE ROOM

## 2020-09-15 PROCEDURE — 97530 THERAPEUTIC ACTIVITIES: CPT

## 2020-09-15 PROCEDURE — 80048 BASIC METABOLIC PNL TOTAL CA: CPT

## 2020-09-15 PROCEDURE — 84100 ASSAY OF PHOSPHORUS: CPT

## 2020-09-15 PROCEDURE — 97535 SELF CARE MNGMENT TRAINING: CPT

## 2020-09-15 PROCEDURE — 93010 EKG 12-LEAD: ICD-10-PCS | Mod: ,,, | Performed by: INTERNAL MEDICINE

## 2020-09-15 PROCEDURE — 63600175 PHARM REV CODE 636 W HCPCS: Performed by: STUDENT IN AN ORGANIZED HEALTH CARE EDUCATION/TRAINING PROGRAM

## 2020-09-15 RX ORDER — POTASSIUM CHLORIDE 7.45 MG/ML
10 INJECTION INTRAVENOUS ONCE
Status: COMPLETED | OUTPATIENT
Start: 2020-09-15 | End: 2020-09-15

## 2020-09-15 RX ORDER — SODIUM CHLORIDE 9 MG/ML
INJECTION, SOLUTION INTRAVENOUS CONTINUOUS
Status: DISCONTINUED | OUTPATIENT
Start: 2020-09-15 | End: 2020-09-19

## 2020-09-15 RX ADMIN — OXYCODONE HYDROCHLORIDE 10 MG: 10 TABLET ORAL at 03:09

## 2020-09-15 RX ADMIN — POTASSIUM CHLORIDE 10 MEQ: 7.46 INJECTION, SOLUTION INTRAVENOUS at 03:09

## 2020-09-15 RX ADMIN — MAGNESIUM SULFATE HEPTAHYDRATE 1 G: 500 INJECTION, SOLUTION INTRAMUSCULAR; INTRAVENOUS at 03:09

## 2020-09-15 RX ADMIN — SODIUM CHLORIDE 500 ML: 0.9 INJECTION, SOLUTION INTRAVENOUS at 03:09

## 2020-09-15 RX ADMIN — PREGABALIN 150 MG: 50 CAPSULE ORAL at 08:09

## 2020-09-15 RX ADMIN — ACETAMINOPHEN 1000 MG: 325 TABLET ORAL at 12:09

## 2020-09-15 RX ADMIN — FAMOTIDINE 20 MG: 20 TABLET, FILM COATED ORAL at 08:09

## 2020-09-15 RX ADMIN — ACETAMINOPHEN 1000 MG: 325 TABLET ORAL at 05:09

## 2020-09-15 RX ADMIN — OXYCODONE 5 MG: 5 TABLET ORAL at 10:09

## 2020-09-15 RX ADMIN — ACETAMINOPHEN 1000 MG: 325 TABLET ORAL at 08:09

## 2020-09-15 NOTE — ASSESSMENT & PLAN NOTE
- po tylenol, PO oxycodone for pain control, toradol for 3 days - complete  - Will defer advancing diet to CRS  - given vomiting, will initiate aspiration precautions   - HLIV  - CBC, BMP, Mg, Phos daily  - Ambulate qid  - PT/OT on board  - Perioperative ancef x24 hours - completed   - Wound care ostomy consult  - Social work consult re: discharge planning  - DC lovenox due to drifting hgb, stable today, she is HDS  - Drains: maintain ORESTES (Cr 0.8), ureteral stents, and red rubber  - Prophylaxis: IS, SCDs, GI ppx    Will obtain KUB this morning

## 2020-09-15 NOTE — SUBJECTIVE & OBJECTIVE
Interval History:   Again was nauseated after dinner yesterday and vomited twice. Uninterested in food this morning. Still passed flatus     Review of Systems    Objective:     Temp:  [97.2 °F (36.2 °C)-98.5 °F (36.9 °C)] 97.9 °F (36.6 °C)  Pulse:  [86-96] 92  Resp:  [15-19] 19  SpO2:  [95 %-98 %] 98 %  BP: (131-166)/(65-94) 166/94     Body mass index is 28.94 kg/m².           Drains     Drain                 Nephrostomy 08/13/20 0805 Left 12 Fr. 31 days         Nephrostomy 08/13/20 0809 Right 12 Fr. 31 days         Closed/Suction Drain 09/11/20 1832 Right Abdomen Bulb 15 Fr. 1 day         Urostomy 09/12/20 0800 LLQ 1 day                Physical Exam   Constitutional: No distress.   HENT:   Head: Normocephalic and atraumatic.   Eyes: Conjunctivae are normal. No scleral icterus.   Neck: Normal range of motion.   Cardiovascular: Normal rate.    Pulmonary/Chest: Effort normal. No respiratory distress.   Abdominal: Soft. She exhibits no distension. There is abdominal tenderness (appropriate). There is no rebound and no guarding.   Urostomy p/p/p draining clear yellow urine  Ureteral stents in place  ORESTES draining ss   Musculoskeletal: Normal range of motion.      Comments: SCDs in place   Neurological: She is alert.   Skin: Skin is warm and dry. She is not diaphoretic.         Significant Labs:    BMP:  Recent Labs   Lab 09/12/20  0400 09/13/20 0436 09/14/20  0402    143 141   K 4.3 3.8 3.7    113* 110   CO2 23 24 20*   BUN 12 13 10   CREATININE 1.2 1.0 0.8   CALCIUM 8.3* 7.9* 8.3*       CBC:   Recent Labs   Lab 09/12/20  0400 09/13/20  0436 09/14/20  0403   WBC 11.56 9.37 10.46   HGB 9.4* 7.9* 8.1*   HCT 31.7* 26.4* 26.8*    160 227       All pertinent labs results from the past 24 hours have been reviewed.    Significant Imaging:  All pertinent imaging results/findings from the past 24 hours have been reviewed.

## 2020-09-15 NOTE — PLAN OF CARE
Patient resting in bed comfortably. iv intact and infusing free of irration, fall precautions maintained no falls noted. Call light in reach bed locked and in lowest position. Non skid socks on while out of bed. Patient instructed to call for assistance. Skin integrity maintained as patient is independent with frequent positioning, C/o pain managed with PRN meds, No other complaints or concerns. Drains in place, Will continue to monitor and follow careplan of care.

## 2020-09-15 NOTE — PROGRESS NOTES
[facility]  Urology  Progress Note    Patient Name: Edita Riley  MRN: [id number]  Admission Date: 9/11/2020  Hospital Length of Stay: 4 days  Code Status: Full Code   Attending Provider: Leslie Balbuena MD   Primary Care Physician: Audrey Mustafa MD    Subjective:     HPI:  Edita Riley is a 55 y.o. female with a history of cervical cancer s/p pelvic radiation. She has since developed bilateral ureteral strictures and bilateral hydronephrosis R>L. She had a MAG3 scan confirmed presence of bilateral obstruction. She is s/p open transverse colon conduit urinary diversion on 9/11/2020.     Interval History:   Again was nauseated after dinner yesterday and vomited twice. Uninterested in food this morning. Still passed flatus     Review of Systems    Objective:     Temp:  [97.2 °F (36.2 °C)-98.5 °F (36.9 °C)] 97.9 °F (36.6 °C)  Pulse:  [86-96] 92  Resp:  [15-19] 19  SpO2:  [95 %-98 %] 98 %  BP: (131-166)/(65-94) 166/94     Body mass index is 28.94 kg/m².           Drains     Drain                 Nephrostomy 08/13/20 0805 Left 12 Fr. 31 days         Nephrostomy 08/13/20 0809 Right 12 Fr. 31 days         Closed/Suction Drain 09/11/20 1832 Right Abdomen Bulb 15 Fr. 1 day         Urostomy 09/12/20 0800 LLQ 1 day                Physical Exam   Constitutional: No distress.   HENT:   Head: Normocephalic and atraumatic.   Eyes: Conjunctivae are normal. No scleral icterus.   Neck: Normal range of motion.   Cardiovascular: Normal rate.    Pulmonary/Chest: Effort normal. No respiratory distress.   Abdominal: Soft. She exhibits no distension. There is abdominal tenderness (appropriate). There is no rebound and no guarding.   Urostomy p/p/p draining clear yellow urine  Ureteral stents in place  ORESTES draining ss   Musculoskeletal: Normal range of motion.      Comments: SCDs in place   Neurological: She is alert.   Skin: Skin is warm and dry. She is not diaphoretic.         Significant  Labs:    BMP:  Recent Labs   Lab 09/12/20  0400 09/13/20  0436 09/14/20  0402    143 141   K 4.3 3.8 3.7    113* 110   CO2 23 24 20*   BUN 12 13 10   CREATININE 1.2 1.0 0.8   CALCIUM 8.3* 7.9* 8.3*       CBC:   Recent Labs   Lab 09/12/20  0400 09/13/20  0436 09/14/20  0403   WBC 11.56 9.37 10.46   HGB 9.4* 7.9* 8.1*   HCT 31.7* 26.4* 26.8*    160 227       All pertinent labs results from the past 24 hours have been reviewed.    Significant Imaging:  All pertinent imaging results/findings from the past 24 hours have been reviewed.                  Assessment/Plan:     * Bilateral ureteral obstruction  - po tylenol, PO oxycodone for pain control, toradol for 3 days - complete  - Will defer advancing diet to CRS  - given vomiting, will initiate aspiration precautions   - HLIV  - CBC, BMP, Mg, Phos daily  - Ambulate qid  - PT/OT on board  - Perioperative ancef x24 hours - completed   - Wound care ostomy consult  - Social work consult re: discharge planning  - DC lovenox due to drifting hgb, stable today, she is HDS  - Drains: maintain ORESTES (Cr 0.8), ureteral stents, and red rubber  - Prophylaxis: IS, SCDs, GI ppx    Will obtain KUB this morning               VTE Risk Mitigation (From admission, onward)         Ordered     IP VTE HIGH RISK PATIENT  Once      09/11/20 2024     Place sequential compression device  Until discontinued      09/11/20 2024     Place sequential compression device  Until discontinued      09/11/20 0509     Place sequential compression device  Until discontinued      09/11/20 0509                Mana Wilks MD  Urology  [facility]

## 2020-09-15 NOTE — NURSING
Pt appears with low grade temp, tachy, states feels weak and doesn't feel well. Tylenol given for temp, rechecked temp down, remains tachy. Dr tacho cota MD ordered to keep pt npo and start fluids, EKG completed, will cont to monitor.

## 2020-09-15 NOTE — PT/OT/SLP PROGRESS
Occupational Therapy   Treatment    Name: Edita Riley  MRN: [id number]  Admitting Diagnosis:  Bilateral ureteral obstruction      4 Days Post-Op  Pre-op Diagnosis: Bilateral ureteral obstruction [N13.5]  Hydronephrosis with ureteral stricture, not elsewhere classified [N13.1]  Bilateral hydronephrosis [N13.30]    Procedure(s):  MOBILIZATION, COLONIC  CREATION,CONDUIT,TRANSVERSE COLON     Recommendations:     Discharge Recommendations: home with home health  Discharge Equipment Recommendations: walker, rolling, bath bench  Barriers to discharge: None    Assessment:     Edita Riley is a 55 y.o. female with a medical diagnosis of Bilateral ureteral obstruction. She presents with the following performance deficits affecting function: weakness, impaired endurance, pain, impaired self care skills, impaired functional mobilty, gait instability. Patient c/o abdominal pain but agreed to participate in therapy session. Patient completed functional mobility within room with min assist with hand-held assist. At this time, patient will continue to benefit from acute skilled therapy intervention to address deficits/underlying impairments and progress towards prior level of function. After discharge, patient will benefit from receiving home health therapy services to ensure safety and independence in the home environment.    Rehab Prognosis:  Good; patient would benefit from acute skilled OT services to address these deficits and reach maximum level of function.       Plan:     Patient to be seen 4 x/week to address the above listed problems via self-care/home management, therapeutic activities, therapeutic exercises  · Plan of Care Expires: 10/11/20  · Plan of Care Reviewed with: patient    Subjective     Pain/Comfort:  · Pain Rating 1: 10/10  · Location 1: abdomen  · Pain Addressed 1: Reposition, Distraction, Nurse notified, Cessation of Activity    Objective:     Communicated with: RN prior to session.  Patient found HOB elevated with telemetry, peripheral IV, ORESTES drain, nephrostomy, urostomy, and RN present upon OT entry to room.    General Precautions: Standard, fall   Orthopedic Precautions:N/A   Braces: N/A     Occupational Performance:     Bed Mobility:  · Patient completed Scooting/Bridging with stand by assistance  · Patient completed Supine to Sit with moderate assistance with HOB elevated  · Patient completed Sit to Supine with minimum assistance with HOB flat    Functional Mobility/Transfers:  · Patient completed Sit <> Stand Transfer with minimum assistance  with  no assistive device   · Patient completed Toilet Transfer Step Transfer technique with minimum assistance with  grab bars  · Functional Mobility: Patient ambulated EOB <> bathroom with minimum assistance with hand-held assist    Activities of Daily Living:  · Grooming: stand by assistance standing at sink  · Toileting: stand by assistance for hygiene; min assist managing hospital gown    [facility] 6 Click ADL: 18    Treatment & Education:   Therapist provided facilitation and instruction of proper body mechanics and fall prevention strategies during tasks listed above.   Instructed patient to sit in bedside chair daily to increase OOB/activity tolerance.   Instructed patient to use call light to have nursing staff assist with needs/transfers.   Discussed OT POC and answered all questions within OT scope of practice.   Whiteboard updated     Patient left HOB elevated with all lines intact, call button in reach and RN notifiedEducation:      GOALS:   Multidisciplinary Problems     Occupational Therapy Goals        Problem: Occupational Therapy Goal    Goal Priority Disciplines Outcome Interventions   Occupational Therapy Goal     OT, PT/OT Ongoing, Progressing    Description: Goals to be met by: 9/26/2020    Patient will increase functional independence with ADLs by performing:    LE Dressing with Supervision.  Grooming while standing with  Supervision.  Toileting from toilet with Supervision for hygiene and clothing management.   Supine to sit with Supervision.  Toilet transfer to toilet with Supervision.                     Time Tracking:     OT Date of Treatment: 09/15/20  OT Start Time: 1508  OT Stop Time: 1534  OT Total Time (min): 26 min    Billable Minutes:Self Care/Home Management 8  Therapeutic Activity 18    Megha Pavon OT  9/15/2020

## 2020-09-15 NOTE — PROGRESS NOTES
Ostomy care follow up:     Second lesson completed today. Met with patient and discussed goals of education to include stoma and peristomal care, obtaining supplies, pouch emptying and using drainage bags. Patient tried to be more engaged in training today but complained of weakness and fatigue. Reviewed cutting urostomy pouch and emptying pouch. Patient return demonstration with difficulty and max assistance. Patient continued to have difficulty using scissors to cut ostomy pouch. Brought supplies to bedside. Nursing and MD team notified regarding today's training. Wound care to continue to follow pt PRN [id number]

## 2020-09-16 LAB
ANION GAP SERPL CALC-SCNC: 10 MMOL/L (ref 8–16)
ANISOCYTOSIS BLD QL SMEAR: SLIGHT
BASOPHILS # BLD AUTO: 0.02 K/UL (ref 0–0.2)
BASOPHILS NFR BLD: 0.2 % (ref 0–1.9)
BUN SERPL-MCNC: 20 MG/DL (ref 6–20)
CALCIUM SERPL-MCNC: 8.3 MG/DL (ref 8.7–10.5)
CHLORIDE SERPL-SCNC: 108 MMOL/L (ref 95–110)
CO2 SERPL-SCNC: 25 MMOL/L (ref 23–29)
CREAT SERPL-MCNC: 1 MG/DL (ref 0.5–1.4)
DIFFERENTIAL METHOD: ABNORMAL
EOSINOPHIL # BLD AUTO: 0.1 K/UL (ref 0–0.5)
EOSINOPHIL NFR BLD: 1.1 % (ref 0–8)
ERYTHROCYTE [DISTWIDTH] IN BLOOD BY AUTOMATED COUNT: 16.6 % (ref 11.5–14.5)
EST. GFR  (AFRICAN AMERICAN): >60 ML/MIN/1.73 M^2
EST. GFR  (NON AFRICAN AMERICAN): >60 ML/MIN/1.73 M^2
FERRITIN SERPL-MCNC: 135 NG/ML (ref 20–300)
FOLATE SERPL-MCNC: 2.2 NG/ML (ref 4–24)
GLUCOSE SERPL-MCNC: 93 MG/DL (ref 70–110)
HAPTOGLOB SERPL-MCNC: 153 MG/DL (ref 30–250)
HCT VFR BLD AUTO: 26.2 % (ref 37–48.5)
HGB BLD-MCNC: 7.8 G/DL (ref 12–16)
HYPOCHROMIA BLD QL SMEAR: ABNORMAL
IMM GRANULOCYTES # BLD AUTO: 0.07 K/UL (ref 0–0.04)
IMM GRANULOCYTES NFR BLD AUTO: 0.7 % (ref 0–0.5)
IRON SERPL-MCNC: <10 UG/DL (ref 30–160)
LDH SERPL L TO P-CCNC: 272 U/L (ref 110–260)
LYMPHOCYTES # BLD AUTO: 0.8 K/UL (ref 1–4.8)
LYMPHOCYTES NFR BLD: 7.4 % (ref 18–48)
MAGNESIUM SERPL-MCNC: 1.6 MG/DL (ref 1.6–2.6)
MCH RBC QN AUTO: 26.6 PG (ref 27–31)
MCHC RBC AUTO-ENTMCNC: 29.8 G/DL (ref 32–36)
MCV RBC AUTO: 89 FL (ref 82–98)
MONOCYTES # BLD AUTO: 0.5 K/UL (ref 0.3–1)
MONOCYTES NFR BLD: 5 % (ref 4–15)
NEUTROPHILS # BLD AUTO: 8.9 K/UL (ref 1.8–7.7)
NEUTROPHILS NFR BLD: 85.6 % (ref 38–73)
NRBC BLD-RTO: 0 /100 WBC
OVALOCYTES BLD QL SMEAR: ABNORMAL
PHOSPHATE SERPL-MCNC: 4.3 MG/DL (ref 2.7–4.5)
PLATELET # BLD AUTO: 268 K/UL (ref 150–350)
PMV BLD AUTO: 9.7 FL (ref 9.2–12.9)
POIKILOCYTOSIS BLD QL SMEAR: SLIGHT
POLYCHROMASIA BLD QL SMEAR: ABNORMAL
POTASSIUM SERPL-SCNC: 4 MMOL/L (ref 3.5–5.1)
RBC # BLD AUTO: 2.93 M/UL (ref 4–5.4)
RETICS/RBC NFR AUTO: 2.3 % (ref 0.5–2.5)
SATURATED IRON: ABNORMAL % (ref 20–50)
SODIUM SERPL-SCNC: 143 MMOL/L (ref 136–145)
TOTAL IRON BINDING CAPACITY: 67 UG/DL (ref 250–450)
TRANSFERRIN SERPL-MCNC: 45 MG/DL (ref 200–375)
VIT B12 SERPL-MCNC: 283 PG/ML (ref 210–950)
WBC # BLD AUTO: 10.4 K/UL (ref 3.9–12.7)

## 2020-09-16 PROCEDURE — 83540 ASSAY OF IRON: CPT

## 2020-09-16 PROCEDURE — 83735 ASSAY OF MAGNESIUM: CPT

## 2020-09-16 PROCEDURE — 99232 PR SUBSEQUENT HOSPITAL CARE,LEVL II: ICD-10-PCS | Mod: ,,, | Performed by: INTERNAL MEDICINE

## 2020-09-16 PROCEDURE — 84100 ASSAY OF PHOSPHORUS: CPT

## 2020-09-16 PROCEDURE — 82607 VITAMIN B-12: CPT

## 2020-09-16 PROCEDURE — 36415 COLL VENOUS BLD VENIPUNCTURE: CPT

## 2020-09-16 PROCEDURE — 99232 SBSQ HOSP IP/OBS MODERATE 35: CPT | Mod: ,,, | Performed by: INTERNAL MEDICINE

## 2020-09-16 PROCEDURE — 25000003 PHARM REV CODE 250: Performed by: STUDENT IN AN ORGANIZED HEALTH CARE EDUCATION/TRAINING PROGRAM

## 2020-09-16 PROCEDURE — 82746 ASSAY OF FOLIC ACID SERUM: CPT

## 2020-09-16 PROCEDURE — 85045 AUTOMATED RETICULOCYTE COUNT: CPT

## 2020-09-16 PROCEDURE — 83010 ASSAY OF HAPTOGLOBIN QUANT: CPT

## 2020-09-16 PROCEDURE — 85025 COMPLETE CBC W/AUTO DIFF WBC: CPT

## 2020-09-16 PROCEDURE — 80048 BASIC METABOLIC PNL TOTAL CA: CPT

## 2020-09-16 PROCEDURE — 82728 ASSAY OF FERRITIN: CPT

## 2020-09-16 PROCEDURE — 97530 THERAPEUTIC ACTIVITIES: CPT

## 2020-09-16 PROCEDURE — 97535 SELF CARE MNGMENT TRAINING: CPT

## 2020-09-16 PROCEDURE — 83615 LACTATE (LD) (LDH) ENZYME: CPT

## 2020-09-16 PROCEDURE — 11000001 HC ACUTE MED/SURG PRIVATE ROOM

## 2020-09-16 RX ORDER — FOLIC ACID 1 MG/1
1 TABLET ORAL DAILY
Status: DISCONTINUED | OUTPATIENT
Start: 2020-09-16 | End: 2020-09-18

## 2020-09-16 RX ADMIN — FAMOTIDINE 20 MG: 20 TABLET, FILM COATED ORAL at 08:09

## 2020-09-16 RX ADMIN — ONDANSETRON HYDROCHLORIDE 4 MG: 4 TABLET, FILM COATED ORAL at 07:09

## 2020-09-16 RX ADMIN — SODIUM CHLORIDE: 0.9 INJECTION, SOLUTION INTRAVENOUS at 12:09

## 2020-09-16 RX ADMIN — FAMOTIDINE 20 MG: 20 TABLET, FILM COATED ORAL at 09:09

## 2020-09-16 RX ADMIN — FOLIC ACID 1 MG: 1 TABLET ORAL at 05:09

## 2020-09-16 RX ADMIN — ACETAMINOPHEN 1000 MG: 325 TABLET ORAL at 12:09

## 2020-09-16 RX ADMIN — ACETAMINOPHEN 1000 MG: 325 TABLET ORAL at 05:09

## 2020-09-16 RX ADMIN — PREGABALIN 150 MG: 50 CAPSULE ORAL at 09:09

## 2020-09-16 RX ADMIN — ACETAMINOPHEN 1000 MG: 325 TABLET ORAL at 01:09

## 2020-09-16 NOTE — PROGRESS NOTES
[facility]  Urology  Progress Note    Patient Name: Edita Riley  MRN: [id number]  Admission Date: 9/11/2020  Hospital Length of Stay: 5 days  Code Status: Full Code   Attending Provider: Leslie Balbuena MD   Primary Care Physician: Audrey Mustafa MD    Subjective:     HPI:  Edita Riley is a 55 y.o. female with a history of cervical cancer s/p pelvic radiation. She has since developed bilateral ureteral strictures and bilateral hydronephrosis R>L. She had a MAG3 scan confirmed presence of bilateral obstruction. She is s/p open transverse colon conduit urinary diversion on 9/11/2020.     Interval History:   Still appears weak this morning. No appetite. On fluids     Review of Systems    Objective:     Temp:  [97.2 °F (36.2 °C)-99.5 °F (37.5 °C)] 97.4 °F (36.3 °C)  Pulse:  [105-119] 106  Resp:  [15-18] 16  SpO2:  [95 %-99 %] 95 %  BP: (109-112)/(53-69) 109/53     Body mass index is 28.94 kg/m².           Drains     Drain                 Nephrostomy 08/13/20 0805 Left 12 Fr. 31 days         Nephrostomy 08/13/20 0809 Right 12 Fr. 31 days         Closed/Suction Drain 09/11/20 1832 Right Abdomen Bulb 15 Fr. 1 day         Urostomy 09/12/20 0800 LLQ 1 day                Physical Exam   Constitutional: No distress.   HENT:   Head: Normocephalic and atraumatic.   Eyes: Conjunctivae are normal. No scleral icterus.   Neck: Normal range of motion.   Cardiovascular: Normal rate.    Pulmonary/Chest: Effort normal. No respiratory distress.   Abdominal: Soft. She exhibits no distension. There is abdominal tenderness (appropriate). There is no rebound and no guarding.   Urostomy p/p/p draining clear yellow urine  Ureteral stents in place  ORESTES draining ss   Musculoskeletal: Normal range of motion.      Comments: SCDs in place   Neurological: She is alert.   Skin: Skin is warm and dry. She is not diaphoretic.         Significant Labs:    BMP:  Recent Labs   Lab 09/14/20  0402 09/15/20  0518  09/16/20  0430    142 143   K 3.7 3.7 4.0    107 108   CO2 20* 24 25   BUN 10 12 20   CREATININE 0.8 0.9 1.0   CALCIUM 8.3* 8.7 8.3*       CBC:   Recent Labs   Lab 09/14/20  0403 09/15/20  0518 09/16/20  0429   WBC 10.46 5.00 10.40   HGB 8.1* 9.2* 7.8*   HCT 26.8* 30.9* 26.2*    260 268       All pertinent labs results from the past 24 hours have been reviewed.    Significant Imaging:  All pertinent imaging results/findings from the past 24 hours have been reviewed.                  Assessment/Plan:     * Bilateral ureteral obstruction  - po tylenol, PO oxycodone for pain control, toradol for 3 days - complete  - Will defer advancing diet to CRS  - given vomiting, will initiate aspiration precautions   - HLIV  - CBC, BMP, Mg, Phos daily  - Ambulate qid  - PT/OT on board  - Perioperative ancef x24 hours - completed   - Wound care ostomy consult  - Social work consult re: discharge planning  - DC lovenox due to drifting hgb, stable today, she is HDS  - Drains: maintain ORESTES (Cr 0.8), ureteral stents, and red rubber  - Prophylaxis: IS, SCDs, GI ppx    - will discuss iron infusion with hematology               VTE Risk Mitigation (From admission, onward)         Ordered     IP VTE HIGH RISK PATIENT  Once      09/11/20 2024     Place sequential compression device  Until discontinued      09/11/20 2024     Place sequential compression device  Until discontinued      09/11/20 0509     Place sequential compression device  Until discontinued      09/11/20 0509                Mana Wilks MD  Urology  [facility]

## 2020-09-16 NOTE — NURSING
Pt resting quietly in bed with eyes closed. Resp even/unlabored. NAD noted. Easily arousable to verbal stimuli. Offers no complaints at present. Awaiting MD orders /disposition. Call light in reach. Will continue to monitor. ADMIT evaluation continues.

## 2020-09-16 NOTE — PROGRESS NOTES
Ostomy care follow up:     Third lesson completed today. Met with patient and reviewed goals of education to include stoma and peristomal care, obtaining supplies, pouch emptying and using drainage bags. Upon assessment noted patient's urostomy pouch leaking and completed pouch change today. Patient tried to verbalize the process for changing urostomy pouch with some difficulty. Provided additional ostomy supplies at bedside as well as sent supplies to her home through [employer]. Nursing and MD team notified regarding today's training. Wound Ostomy to continue to follow pt PRN [id number].

## 2020-09-16 NOTE — PLAN OF CARE
Problem: Adult Inpatient Plan of Care  Goal: Optimal Comfort and Wellbeing  Outcome: Ongoing, Progressing  Goal: Readiness for Transition of Care  Outcome: Ongoing, Progressing     Problem: Fall Injury Risk  Goal: Absence of Fall and Fall-Related Injury  Outcome: Ongoing, Progressing     Problem: Skin Injury Risk Increased  Goal: Skin Health and Integrity  Outcome: Ongoing, Progressing

## 2020-09-16 NOTE — NURSING
Pt resting quietly in bed with eyes closed, resp even/unlabored. NAD noted. Easily arousable to verbal stimuli. Pt answering assessment questions more appropriately at this time and is able to state both date and time. GCS 15. Offers no complaints at present. Awaiting MD orders/ disposition. Bed rails up x 2 with bed locked in lowest position. Call light in reach. Will continue to monitor. ADMIT evaluation continues.

## 2020-09-16 NOTE — SUBJECTIVE & OBJECTIVE
Interval History:   Still appears weak this morning. No appetite. On fluids     Review of Systems    Objective:     Temp:  [97.2 °F (36.2 °C)-99.5 °F (37.5 °C)] 97.4 °F (36.3 °C)  Pulse:  [105-119] 106  Resp:  [15-18] 16  SpO2:  [95 %-99 %] 95 %  BP: (109-112)/(53-69) 109/53     Body mass index is 28.94 kg/m².           Drains     Drain                 Nephrostomy 08/13/20 0805 Left 12 Fr. 31 days         Nephrostomy 08/13/20 0809 Right 12 Fr. 31 days         Closed/Suction Drain 09/11/20 1832 Right Abdomen Bulb 15 Fr. 1 day         Urostomy 09/12/20 0800 LLQ 1 day                Physical Exam   Constitutional: No distress.   HENT:   Head: Normocephalic and atraumatic.   Eyes: Conjunctivae are normal. No scleral icterus.   Neck: Normal range of motion.   Cardiovascular: Normal rate.    Pulmonary/Chest: Effort normal. No respiratory distress.   Abdominal: Soft. She exhibits no distension. There is abdominal tenderness (appropriate). There is no rebound and no guarding.   Urostomy p/p/p draining clear yellow urine  Ureteral stents in place  ORESTES draining ss   Musculoskeletal: Normal range of motion.      Comments: SCDs in place   Neurological: She is alert.   Skin: Skin is warm and dry. She is not diaphoretic.         Significant Labs:    BMP:  Recent Labs   Lab 09/14/20  0402 09/15/20  0518 09/16/20  0430    142 143   K 3.7 3.7 4.0    107 108   CO2 20* 24 25   BUN 10 12 20   CREATININE 0.8 0.9 1.0   CALCIUM 8.3* 8.7 8.3*       CBC:   Recent Labs   Lab 09/14/20  0403 09/15/20  0518 09/16/20  0429   WBC 10.46 5.00 10.40   HGB 8.1* 9.2* 7.8*   HCT 26.8* 30.9* 26.2*    260 268       All pertinent labs results from the past 24 hours have been reviewed.    Significant Imaging:  All pertinent imaging results/findings from the past 24 hours have been reviewed.

## 2020-09-16 NOTE — PLAN OF CARE
Pt motivated for therapy and tolerated session well secondary to weakness. Pt required SBA in bed mobility and HHA with no AD for ambulation due to BLE weakness. Pt stood at sink CGA to complete grooming tasks. Pt ambulated ~100ft HHA with no AD to increase activity tolerance required for adls and functional mobility. Continue Ot POC      Problem: Occupational Therapy Goal  Goal: Occupational Therapy Goal  Description: Goals to be met by: 9/26/2020    Patient will increase functional independence with ADLs by performing:    LE Dressing with Supervision.  Grooming while standing with Supervision.  Toileting from toilet with Supervision for hygiene and clothing management.   Supine to sit with Supervision.  Toilet transfer to toilet with Supervision.    Outcome: Ongoing, Progressing

## 2020-09-16 NOTE — ASSESSMENT & PLAN NOTE
- po tylenol, PO oxycodone for pain control, toradol for 3 days - complete  - Will defer advancing diet to CRS  - given vomiting, will initiate aspiration precautions   - HLIV  - CBC, BMP, Mg, Phos daily  - Ambulate qid  - PT/OT on board  - Perioperative ancef x24 hours - completed   - Wound care ostomy consult  - Social work consult re: discharge planning  - DC lovenox due to drifting hgb, stable today, she is HDS  - Drains: maintain ORESTES (Cr 0.8), ureteral stents, and red rubber  - Prophylaxis: IS, SCDs, GI ppx    - will discuss iron infusion with hematology

## 2020-09-16 NOTE — CONSULTS
Consult Note    Inpatient consult to Hematology/Oncology  Consult performed by: Kelechi Patterson MD  Consult ordered by: Mana Wilks MD        SUBJECTIVE:     History of Present Illness:  56 yo F with recent bilateral ureteral obstruction and she is s/p urinary diversion with tranverse colon conduit on 9/11/20. We are consulted for anemia and to discus whether iron infusion as an inpatient would be appropriate. PT had cisplatin weekly from 11/1/2018 to 11/28/18. The pt is having some fatigue and weakness, and she is a Jehovahs witness.    She denies any prior oral or IV iron infusion in the past. She has also not had any prior administration of erythropoeitin.      Ferritin was noted to be 190 previously on  6/12/20    Review of patient's allergies indicates:   Allergen Reactions    Bee sting [allergen ext-venom-honey bee]      Rash      Grass pollen-bermuda, standard      rash     Past Medical History:   Diagnosis Date    Abnormal mammogram 8/25/2020    Anxiety     Cervical cancer 2014    Chronic back pain     Depression     Diarrhea due to malabsorption 11/14/2018    Fibromyalgia     Generalized abdominal pain 8/25/2020    GERD (gastroesophageal reflux disease)     Hiatal hernia 2014    History of cervical cancer 10/11/2018    History of cervical cancer 10/11/2018    Hx of psychiatric care     Cymbalta, trazodone    Hypertension     Hypomagnesemia 11/21/2018    Lactose intolerance     Metastatic squamous cell carcinoma to lymph node 10/11/2018    Nephrostomy tube displaced     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 drug 11/14/2018    Osteoarthritis of back     Psychiatric problem     Stroke 1972     Past Surgical History:   Procedure Laterality Date    BILATERAL OOPHORECTOMY  2015    CHOLECYSTECTOMY  11/09/2016    Done at [facility], path showed chronic cholecystitis and gallstones    cold knife conization  2014    COLONOSCOPY  2014    COLONOSCOPY N/A 10/24/2016    at [facility], Dr  Brock    COLONOSCOPY N/A 5/18/2018    Procedure: COLONOSCOPY;  Surgeon: Arden Gutiérrez MD;  Location: [facility] ENDO (4TH FLR);  Service: Endoscopy;  Laterality: N/A;    COLONOSCOPY N/A 7/28/2020    Procedure: COLONOSCOPY;  Surgeon: Hammad Reynolds MD;  Location: [facility] ENDO (4TH FLR);  Service: Colon and Rectal;  Laterality: N/A;  covid test [place] 7/25    CYSTOSCOPY WITH URETEROSCOPY, RETROGRADE PYELOGRAPHY, AND INSERTION OF STENT Bilateral 3/21/2020    Procedure: CYSTOSCOPY, WITH RETROGRADE PYELOGRAM,;  Surgeon: Leslie Balbuena MD;  Location: [facility] OR 1ST FLR;  Service: Urology;  Laterality: Bilateral;    ESOPHAGOGASTRODUODENOSCOPY  2014    HYSTERECTOMY  2014    TVH for cervical cancer    MOBILIZATION OF SPLENIC FLEXURE N/A 9/11/2020    Procedure: MOBILIZATION, COLONIC;  Surgeon: Hammad Reynolds MD;  Location: [facility] OR 2ND FLR;  Service: Colon and Rectal;  Laterality: N/A;    OOPHORECTOMY      RETROPERITONEAL LYMPHADENECTOMY Right 9/17/2018    Procedure: LYMPHADENECTOMY, RETROPERITONEUM;  Surgeon: Sebas Reed MD;  Location: [facility] OR 2ND FLR;  Service: General;  Laterality: Right;  ILIAC     Family History   Problem Relation Age of Onset    Heart disease Sister     Heart disease Maternal Grandmother     Colon cancer Father     Esophageal cancer Father     Cancer Father         Lung-smoker    Cancer Mother         Cervical    Cervical cancer Mother     Breast cancer Maternal Aunt     Suicide Daughter         jumped from parking structure    Drug abuse Daughter     Drug abuse Daughter     Rectal cancer Neg Hx     Stomach cancer Neg Hx     Crohn's disease Neg Hx     Ulcerative colitis Neg Hx     Diabetes Neg Hx     Hypertension Neg Hx      Social History     Tobacco Use    Smoking status: Former Smoker    Smokeless tobacco: Never Used   Substance Use Topics    Alcohol use: No     Alcohol/week: 0.0 standard drinks    Drug use: No     Review of Systems   Constitutional: Positive for  malaise/fatigue. Negative for weight loss.   HENT: Negative for sore throat.    Eyes: Negative for double vision.   Respiratory: Negative for cough and shortness of breath.    Cardiovascular: Negative for chest pain and leg swelling.   Gastrointestinal: Positive for abdominal pain and constipation.   Genitourinary: Negative for hematuria.   Skin: Negative for rash.   Neurological: Negative for loss of consciousness and headaches.     OBJECTIVE:     Vital Signs:  Temp:  [97.4 °F (36.3 °C)-98.8 °F (37.1 °C)]   Pulse:  [106]   Resp:  [16]   BP: (109-120)/(53-58)   SpO2:  [95 %-97 %]     Physical Exam   Constitutional: No distress.   HENT:   Head: Normocephalic and atraumatic.   Eyes: Conjunctivae are normal. No scleral icterus.   Neck: Normal range of motion.   Cardiovascular: Normal rate.    Pulmonary/Chest: Effort normal. No respiratory distress.   Abdominal: Soft. She exhibits no distension. There is abdominal tenderness. There is no rebound and no guarding.   Urostomy p/p/p draining clear yellow urine  Ureteral stents in place  ORESTES draining ss   Musculoskeletal: Normal range of motion.    Neurological: She is alert.   Skin: Skin is warm and dry. She is not diaphoretic.       Laboratory:  CBC:   Recent Labs   Lab 09/16/20  0429   WBC 10.40   RBC 2.93*   HGB 7.8*   HCT 26.2*      MCV 89   MCH 26.6*   MCHC 29.8*     BMP:   Recent Labs   Lab 09/16/20  0430   GLU 93      K 4.0      CO2 25   BUN 20   CREATININE 1.0   CALCIUM 8.3*   MG 1.6     CMP:   Recent Labs   Lab 09/16/20  0430   GLU 93   CALCIUM 8.3*      K 4.0   CO2 25      BUN 20   CREATININE 1.0     LFTs: No results for input(s): ALT, AST, ALKPHOS, BILITOT, PROT, ALBUMIN in the last 168 hours.  Coagulation: No results for input(s): LABPROT, INR, APTT in the last 168 hours.  Cardiac markers: No results for input(s): CKMB, CPKMB, TROPONINT, TROPONINI, MYOGLOBIN in the last 168 hours.  ABGs: No results for input(s): PH, PCO2, PO2, HCO3,  POCSATURATED, BE in the last 168 hours.  Microbiology Results (last 7 days)     ** No results found for the last 168 hours. **        Specimen (12h ago, onward)    None        No results for input(s): COLORU, CLARITYU, SPECGRAV, PHUR, PROTEINUA, GLUCOSEU, BILIRUBINCON, BLOODU, WBCU, RBCU, BACTERIA, MUCUS, NITRITE, LEUKOCYTESUR, UROBILINOGEN, HYALINECASTS in the last 168 hours.    Diagnostic Results:      ASSESSMENT/PLAN:       Plan:     ANemia  - noted pt's history of being a Sikhism  - pt's hemoglobin has been in 10-11 range; her Hgb is noted to be 7.8 today   - ferritin is noted to be 135 and TIBC low, so although there may have some blood loss post-op, labs are currently not indicative of iron deficiency anemia  - will check an EPO level to see if administration of erythropoeitin would help   - recommend minimal blood draws, please use pediatric tubes when possible  - hemolytic labs negative  - folate of 2.2, will replete with 1 mg of folic acid daily  - B12 normal at 283      Will continue to follow along. Discussed with attending, Dr. Gemini Patterson MD  Hematology/Oncology Fellow, PGY VI  [facility]        -

## 2020-09-16 NOTE — NURSING
Pt resting quietly in bed with eyes closed, resp even/unlabored. NAD noted. Easily arousable to verbal stimuli. Pts drain emptied as well as urostomy.  Offers no complaints at present. Awaiting MD orders/ disposition. Bed rails up x 2 with bed locked in lowest position. Call light in reach. Will continue to monitor. ADMIT evaluation continues.

## 2020-09-16 NOTE — NURSING
"Assumed care of patient bed 24. Pt post open transverse colon conduit urinary diversion 9/11/2020. Pt with bilateral nephrostomy tubing no collection bags noted. Drainage noted to blue padding on bedding. UROstomy noted to L abdomen and collected to drainage bag. Jack drain noted to R and draining serosanguinous fluid. Pt with SCDs in place but continues to remove velcro securements. Pt has visi but has removed adhesive securements. Pt with flat affect. Pt alert and oriented however states " Yeah yeah my name is Edita Riley or however you say it" and pronounces last name in alternate ways. Pt requiring assistance to readjust self in bed. Assisted pt to pour water in cup to take medication. Pt spilled water cup on table. Assessment done per flowsheet. NAD noted. Awaiting MD orders/ disposition. Call light in reach. Will continue to monitor. Bed rails up x 2 with bed locked in lowest position. ADMIT evaluation continues.    "

## 2020-09-16 NOTE — PLAN OF CARE
[facility] has accepted patient for post-acute care per liaison Demetrio.  to provide urostomy teaching/management. Patient unable to receive pt/ot at home through medicaid. Patient will be provide with a prescription for Outpatient pt/ot at discharge.

## 2020-09-16 NOTE — PT/OT/SLP PROGRESS
Occupational Therapy   Treatment    Name: Edita Riley  MRN: [id number]  Admitting Diagnosis:  Bilateral ureteral obstruction  5 Days Post-Op    Recommendations:     Discharge Recommendations: home with home health  Discharge Equipment Recommendations:  bath bench, walker, rolling  Barriers to discharge:  None    Assessment:     Edita Riley is a 55 y.o. female with a medical diagnosis of Bilateral ureteral obstruction.  She presents with the following Performance deficits affecting function are weakness, impaired endurance, impaired self care skills, impaired functional mobilty, gait instability, impaired balance, decreased lower extremity function, decreased safety awareness, pain, orthopedic precautions.     Pt motivated for therapy and tolerated session well secondary to weakness. Pt required SBA in bed mobility and HHA with no AD for ambulation due to BLE weakness. Pt stood at sink CGA to complete grooming tasks. Pt ambulated ~100ft HHA with no AD to increase activity tolerance required for adls and functional mobility. Continue Ot POC    Rehab Prognosis:  Good; patient would benefit from acute skilled OT services to address these deficits and reach maximum level of function.       Plan:     Patient to be seen 4 x/week to address the above listed problems via self-care/home management, therapeutic activities, therapeutic exercises  · Plan of Care Expires: 10/11/20  · Plan of Care Reviewed with: patient    Subjective     Pain/Comfort:  · Pain Rating 1: 2/10  · Location 1: abdomen  · Pain Addressed 1: Reposition, Distraction, Pre-medicate for activity    Objective:     Communicated with: RN prior to session.  Patient found supine with nephrostomy, ORESTES drain, telemetry, peripheral IV upon OT entry to room.    General Precautions: Standard, fall   Orthopedic Precautions:N/A   Braces: N/A     Occupational Performance:     Bed Mobility:    · Patient completed Scooting/Bridging with stand by  assistance  · Patient completed Supine to Sit with stand by assistance     Functional Mobility/Transfers:  · Patient completed Sit <> Stand Transfer with contact guard assistance  with  hand-held assist   · Patient completed Bed <> Chair Transfer using Step Transfer technique with contact guard assistance with no assistive device  · Patient completed Toilet Transfer Step Transfer technique with contact guard assistance with  hand-held assist  · Functional Mobility: Pt motivated for therapy and tolerated session well secondary to weakness. Pt required SBA in bed mobility and HHA with no AD for ambulation due to BLE weakness. Pt stood at sink CGA to complete grooming tasks. Pt ambulated ~100ft HHA with no AD to increase activity tolerance required for adls and functional mobility. Continue Ot POC    Activities of Daily Living:  · Grooming: contact guard assistance standing at sink  · Upper Body Dressing: minimum assistance eliza gown  · Toileting: stand by assistance pt able to perform perineal hygiene      [facility] 6 Click ADL: 18    Treatment & Education:  - OT/POC-  - Importance of mobility to maximize recovery.  - Whiteboard updated  - safety w/ functional mobility; hand placement to ensure safe transfers to various surfaces in prep for ADLs  - Reviewed gait sequence and RW management via verbalization and demonstration   - encouraged to ambulate within household environment at least every hour to hour 1/2      Patient left up in chair with all lines intact, call button in reach and RN notifiedEducation:      GOALS:   Multidisciplinary Problems     Occupational Therapy Goals        Problem: Occupational Therapy Goal    Goal Priority Disciplines Outcome Interventions   Occupational Therapy Goal     OT, PT/OT Ongoing, Progressing    Description: Goals to be met by: 9/26/2020    Patient will increase functional independence with ADLs by performing:    LE Dressing with Supervision.  Grooming while standing with  Supervision.  Toileting from toilet with Supervision for hygiene and clothing management.   Supine to sit with Supervision.  Toilet transfer to toilet with Supervision.                     Time Tracking:     OT Date of Treatment: 09/16/20  OT Start Time: 0827  OT Stop Time: 0855  OT Total Time (min): 28 min    Billable Minutes:Self Care/Home Management 15  Therapeutic Activity 13    Danielle Funes, JAISON  9/16/2020

## 2020-09-17 ENCOUNTER — ANESTHESIA EVENT (OUTPATIENT)
Dept: SURGERY | Facility: HOSPITAL | Age: 55
DRG: 659 | End: 2020-09-17
Payer: MEDICAID

## 2020-09-17 ENCOUNTER — ANESTHESIA (OUTPATIENT)
Dept: SURGERY | Facility: HOSPITAL | Age: 55
DRG: 659 | End: 2020-09-17
Payer: MEDICAID

## 2020-09-17 LAB
ABO + RH BLD: NORMAL
ANION GAP SERPL CALC-SCNC: 10 MMOL/L (ref 8–16)
ANION GAP SERPL CALC-SCNC: 12 MMOL/L (ref 8–16)
APTT BLDCRRT: 27.6 SEC (ref 21–32)
BASOPHILS # BLD AUTO: 0.01 K/UL (ref 0–0.2)
BASOPHILS # BLD AUTO: 0.03 K/UL (ref 0–0.2)
BASOPHILS NFR BLD: 0.1 % (ref 0–1.9)
BASOPHILS NFR BLD: 0.3 % (ref 0–1.9)
BLD GP AB SCN CELLS X3 SERPL QL: NORMAL
BUN SERPL-MCNC: 23 MG/DL (ref 6–20)
BUN SERPL-MCNC: 25 MG/DL (ref 6–20)
CALCIUM SERPL-MCNC: 8.7 MG/DL (ref 8.7–10.5)
CALCIUM SERPL-MCNC: 9 MG/DL (ref 8.7–10.5)
CHLORIDE SERPL-SCNC: 109 MMOL/L (ref 95–110)
CHLORIDE SERPL-SCNC: 112 MMOL/L (ref 95–110)
CO2 SERPL-SCNC: 22 MMOL/L (ref 23–29)
CO2 SERPL-SCNC: 24 MMOL/L (ref 23–29)
CREAT SERPL-MCNC: 0.9 MG/DL (ref 0.5–1.4)
CREAT SERPL-MCNC: 1 MG/DL (ref 0.5–1.4)
DIFFERENTIAL METHOD: ABNORMAL
DIFFERENTIAL METHOD: ABNORMAL
EOSINOPHIL # BLD AUTO: 0.1 K/UL (ref 0–0.5)
EOSINOPHIL # BLD AUTO: 0.3 K/UL (ref 0–0.5)
EOSINOPHIL NFR BLD: 1 % (ref 0–8)
EOSINOPHIL NFR BLD: 2.1 % (ref 0–8)
ERYTHROCYTE [DISTWIDTH] IN BLOOD BY AUTOMATED COUNT: 16.7 % (ref 11.5–14.5)
ERYTHROCYTE [DISTWIDTH] IN BLOOD BY AUTOMATED COUNT: 16.7 % (ref 11.5–14.5)
EST. GFR  (AFRICAN AMERICAN): >60 ML/MIN/1.73 M^2
EST. GFR  (AFRICAN AMERICAN): >60 ML/MIN/1.73 M^2
EST. GFR  (NON AFRICAN AMERICAN): >60 ML/MIN/1.73 M^2
EST. GFR  (NON AFRICAN AMERICAN): >60 ML/MIN/1.73 M^2
GLUCOSE SERPL-MCNC: 104 MG/DL (ref 70–110)
GLUCOSE SERPL-MCNC: 89 MG/DL (ref 70–110)
GRAM STN SPEC: NORMAL
HCT VFR BLD AUTO: 25.8 % (ref 37–48.5)
HCT VFR BLD AUTO: 29.3 % (ref 37–48.5)
HGB BLD-MCNC: 7.8 G/DL (ref 12–16)
HGB BLD-MCNC: 8.4 G/DL (ref 12–16)
IMM GRANULOCYTES # BLD AUTO: 0.04 K/UL (ref 0–0.04)
IMM GRANULOCYTES # BLD AUTO: 0.1 K/UL (ref 0–0.04)
IMM GRANULOCYTES NFR BLD AUTO: 0.5 % (ref 0–0.5)
IMM GRANULOCYTES NFR BLD AUTO: 0.8 % (ref 0–0.5)
INR PPP: 1.1 (ref 0.8–1.2)
LYMPHOCYTES # BLD AUTO: 0.5 K/UL (ref 1–4.8)
LYMPHOCYTES # BLD AUTO: 0.7 K/UL (ref 1–4.8)
LYMPHOCYTES NFR BLD: 5.3 % (ref 18–48)
LYMPHOCYTES NFR BLD: 5.6 % (ref 18–48)
MAGNESIUM SERPL-MCNC: 2.1 MG/DL (ref 1.6–2.6)
MCH RBC QN AUTO: 26.2 PG (ref 27–31)
MCH RBC QN AUTO: 26.8 PG (ref 27–31)
MCHC RBC AUTO-ENTMCNC: 28.7 G/DL (ref 32–36)
MCHC RBC AUTO-ENTMCNC: 30.2 G/DL (ref 32–36)
MCV RBC AUTO: 89 FL (ref 82–98)
MCV RBC AUTO: 91 FL (ref 82–98)
MONOCYTES # BLD AUTO: 0.5 K/UL (ref 0.3–1)
MONOCYTES # BLD AUTO: 0.7 K/UL (ref 0.3–1)
MONOCYTES NFR BLD: 5.3 % (ref 4–15)
MONOCYTES NFR BLD: 5.9 % (ref 4–15)
NEUTROPHILS # BLD AUTO: 10.2 K/UL (ref 1.8–7.7)
NEUTROPHILS # BLD AUTO: 7.7 K/UL (ref 1.8–7.7)
NEUTROPHILS NFR BLD: 85.3 % (ref 38–73)
NEUTROPHILS NFR BLD: 87.8 % (ref 38–73)
NRBC BLD-RTO: 0 /100 WBC
NRBC BLD-RTO: 0 /100 WBC
PHOSPHATE SERPL-MCNC: 2.9 MG/DL (ref 2.7–4.5)
PLATELET # BLD AUTO: 301 K/UL (ref 150–350)
PLATELET # BLD AUTO: 327 K/UL (ref 150–350)
PLATELET BLD QL SMEAR: ABNORMAL
PMV BLD AUTO: 10.6 FL (ref 9.2–12.9)
PMV BLD AUTO: 9.8 FL (ref 9.2–12.9)
POTASSIUM SERPL-SCNC: 3.6 MMOL/L (ref 3.5–5.1)
POTASSIUM SERPL-SCNC: 3.8 MMOL/L (ref 3.5–5.1)
PROTHROMBIN TIME: 11.8 SEC (ref 9–12.5)
RBC # BLD AUTO: 2.91 M/UL (ref 4–5.4)
RBC # BLD AUTO: 3.21 M/UL (ref 4–5.4)
SARS-COV-2 RDRP RESP QL NAA+PROBE: NEGATIVE
SODIUM SERPL-SCNC: 143 MMOL/L (ref 136–145)
SODIUM SERPL-SCNC: 146 MMOL/L (ref 136–145)
WBC # BLD AUTO: 11.95 K/UL (ref 3.9–12.7)
WBC # BLD AUTO: 8.72 K/UL (ref 3.9–12.7)

## 2020-09-17 PROCEDURE — 25000003 PHARM REV CODE 250: Performed by: STUDENT IN AN ORGANIZED HEALTH CARE EDUCATION/TRAINING PROGRAM

## 2020-09-17 PROCEDURE — 76937 US GUIDE VASCULAR ACCESS: CPT

## 2020-09-17 PROCEDURE — 37000008 HC ANESTHESIA 1ST 15 MINUTES: Performed by: COLON & RECTAL SURGERY

## 2020-09-17 PROCEDURE — C1751 CATH, INF, PER/CENT/MIDLINE: HCPCS

## 2020-09-17 PROCEDURE — 87206 SMEAR FLUORESCENT/ACID STAI: CPT

## 2020-09-17 PROCEDURE — 44141 PR PART REMOVAL COLON W COLOSTOMY: ICD-10-PCS | Mod: 58,ICN,, | Performed by: COLON & RECTAL SURGERY

## 2020-09-17 PROCEDURE — U0002 COVID-19 LAB TEST NON-CDC: HCPCS

## 2020-09-17 PROCEDURE — 11000001 HC ACUTE MED/SURG PRIVATE ROOM

## 2020-09-17 PROCEDURE — 25500020 PHARM REV CODE 255: Performed by: STUDENT IN AN ORGANIZED HEALTH CARE EDUCATION/TRAINING PROGRAM

## 2020-09-17 PROCEDURE — 84100 ASSAY OF PHOSPHORUS: CPT

## 2020-09-17 PROCEDURE — 27201423 OPTIME MED/SURG SUP & DEVICES STERILE SUPPLY: Performed by: COLON & RECTAL SURGERY

## 2020-09-17 PROCEDURE — 83735 ASSAY OF MAGNESIUM: CPT

## 2020-09-17 PROCEDURE — 85025 COMPLETE CBC W/AUTO DIFF WBC: CPT

## 2020-09-17 PROCEDURE — 25000003 PHARM REV CODE 250: Performed by: NURSE ANESTHETIST, CERTIFIED REGISTERED

## 2020-09-17 PROCEDURE — D9220A PRA ANESTHESIA: Mod: ANES,,, | Performed by: ANESTHESIOLOGY

## 2020-09-17 PROCEDURE — 85730 THROMBOPLASTIN TIME PARTIAL: CPT

## 2020-09-17 PROCEDURE — 44141 PARTIAL REMOVAL OF COLON: CPT | Mod: 58,ICN,, | Performed by: COLON & RECTAL SURGERY

## 2020-09-17 PROCEDURE — 85610 PROTHROMBIN TIME: CPT

## 2020-09-17 PROCEDURE — 87102 FUNGUS ISOLATION CULTURE: CPT | Mod: 59

## 2020-09-17 PROCEDURE — 87077 CULTURE AEROBIC IDENTIFY: CPT | Mod: 59

## 2020-09-17 PROCEDURE — D9220A PRA ANESTHESIA: Mod: CRNA,,, | Performed by: NURSE ANESTHETIST, CERTIFIED REGISTERED

## 2020-09-17 PROCEDURE — 36573 INSJ PICC RS&I 5 YR+: CPT

## 2020-09-17 PROCEDURE — 37000009 HC ANESTHESIA EA ADD 15 MINS: Performed by: COLON & RECTAL SURGERY

## 2020-09-17 PROCEDURE — 25500020 PHARM REV CODE 255: Performed by: UROLOGY

## 2020-09-17 PROCEDURE — 87205 SMEAR GRAM STAIN: CPT

## 2020-09-17 PROCEDURE — 87070 CULTURE OTHR SPECIMN AEROBIC: CPT

## 2020-09-17 PROCEDURE — 71000033 HC RECOVERY, INTIAL HOUR: Performed by: COLON & RECTAL SURGERY

## 2020-09-17 PROCEDURE — 71000015 HC POSTOP RECOV 1ST HR: Performed by: COLON & RECTAL SURGERY

## 2020-09-17 PROCEDURE — 86901 BLOOD TYPING SEROLOGIC RH(D): CPT

## 2020-09-17 PROCEDURE — 87076 CULTURE ANAEROBE IDENT EACH: CPT | Mod: 59

## 2020-09-17 PROCEDURE — 44139 MOBILIZATION OF COLON: CPT | Mod: 58,ICN,, | Performed by: COLON & RECTAL SURGERY

## 2020-09-17 PROCEDURE — D9220A PRA ANESTHESIA: ICD-10-PCS | Mod: CRNA,,, | Performed by: NURSE ANESTHETIST, CERTIFIED REGISTERED

## 2020-09-17 PROCEDURE — 36415 COLL VENOUS BLD VENIPUNCTURE: CPT

## 2020-09-17 PROCEDURE — 87186 SC STD MICRODIL/AGAR DIL: CPT

## 2020-09-17 PROCEDURE — 87075 CULTR BACTERIA EXCEPT BLOOD: CPT

## 2020-09-17 PROCEDURE — 82668 ASSAY OF ERYTHROPOIETIN: CPT

## 2020-09-17 PROCEDURE — 71000016 HC POSTOP RECOV ADDL HR: Performed by: COLON & RECTAL SURGERY

## 2020-09-17 PROCEDURE — 80048 BASIC METABOLIC PNL TOTAL CA: CPT

## 2020-09-17 PROCEDURE — 44139 PR MOBILIZE SPLENIC FLEX: ICD-10-PCS | Mod: 58,ICN,, | Performed by: COLON & RECTAL SURGERY

## 2020-09-17 PROCEDURE — 36000709 HC OR TIME LEV III EA ADD 15 MIN: Performed by: COLON & RECTAL SURGERY

## 2020-09-17 PROCEDURE — 36000708 HC OR TIME LEV III 1ST 15 MIN: Performed by: COLON & RECTAL SURGERY

## 2020-09-17 PROCEDURE — 63600175 PHARM REV CODE 636 W HCPCS: Performed by: STUDENT IN AN ORGANIZED HEALTH CARE EDUCATION/TRAINING PROGRAM

## 2020-09-17 PROCEDURE — 63600175 PHARM REV CODE 636 W HCPCS: Performed by: NURSE ANESTHETIST, CERTIFIED REGISTERED

## 2020-09-17 PROCEDURE — D9220A PRA ANESTHESIA: ICD-10-PCS | Mod: ANES,,, | Performed by: ANESTHESIOLOGY

## 2020-09-17 PROCEDURE — 87015 SPECIMEN INFECT AGNT CONCNTJ: CPT | Mod: 59

## 2020-09-17 PROCEDURE — 87116 MYCOBACTERIA CULTURE: CPT

## 2020-09-17 RX ORDER — HYDROMORPHONE HYDROCHLORIDE 1 MG/ML
0.2 INJECTION, SOLUTION INTRAMUSCULAR; INTRAVENOUS; SUBCUTANEOUS EVERY 5 MIN PRN
Status: DISCONTINUED | OUTPATIENT
Start: 2020-09-17 | End: 2020-09-18

## 2020-09-17 RX ORDER — SUCCINYLCHOLINE CHLORIDE 20 MG/ML
INJECTION INTRAMUSCULAR; INTRAVENOUS
Status: DISCONTINUED | OUTPATIENT
Start: 2020-09-17 | End: 2020-09-18

## 2020-09-17 RX ORDER — PROPOFOL 10 MG/ML
VIAL (ML) INTRAVENOUS
Status: DISCONTINUED | OUTPATIENT
Start: 2020-09-17 | End: 2020-09-18

## 2020-09-17 RX ORDER — KETAMINE HCL IN 0.9 % NACL 50 MG/5 ML
SYRINGE (ML) INTRAVENOUS
Status: DISCONTINUED | OUTPATIENT
Start: 2020-09-17 | End: 2020-09-18

## 2020-09-17 RX ORDER — SODIUM CHLORIDE 0.9 % (FLUSH) 0.9 %
10 SYRINGE (ML) INJECTION EVERY 6 HOURS
Status: DISCONTINUED | OUTPATIENT
Start: 2020-09-17 | End: 2020-10-13 | Stop reason: HOSPADM

## 2020-09-17 RX ORDER — DEXAMETHASONE SODIUM PHOSPHATE 4 MG/ML
INJECTION, SOLUTION INTRA-ARTICULAR; INTRALESIONAL; INTRAMUSCULAR; INTRAVENOUS; SOFT TISSUE
Status: DISCONTINUED | OUTPATIENT
Start: 2020-09-17 | End: 2020-09-18

## 2020-09-17 RX ORDER — MIDAZOLAM HYDROCHLORIDE 1 MG/ML
INJECTION, SOLUTION INTRAMUSCULAR; INTRAVENOUS
Status: DISCONTINUED | OUTPATIENT
Start: 2020-09-17 | End: 2020-09-18

## 2020-09-17 RX ORDER — FENTANYL CITRATE 50 UG/ML
INJECTION, SOLUTION INTRAMUSCULAR; INTRAVENOUS
Status: DISCONTINUED | OUTPATIENT
Start: 2020-09-17 | End: 2020-09-18

## 2020-09-17 RX ORDER — DEXMEDETOMIDINE HYDROCHLORIDE 100 UG/ML
INJECTION, SOLUTION INTRAVENOUS
Status: DISCONTINUED | OUTPATIENT
Start: 2020-09-17 | End: 2020-09-18

## 2020-09-17 RX ORDER — LIDOCAINE HCL/PF 100 MG/5ML
SYRINGE (ML) INTRAVENOUS
Status: DISCONTINUED | OUTPATIENT
Start: 2020-09-17 | End: 2020-09-18

## 2020-09-17 RX ORDER — ONDANSETRON 2 MG/ML
INJECTION INTRAMUSCULAR; INTRAVENOUS
Status: DISCONTINUED | OUTPATIENT
Start: 2020-09-17 | End: 2020-09-18

## 2020-09-17 RX ORDER — ROCURONIUM BROMIDE 10 MG/ML
INJECTION, SOLUTION INTRAVENOUS
Status: DISCONTINUED | OUTPATIENT
Start: 2020-09-17 | End: 2020-09-18

## 2020-09-17 RX ORDER — ONDANSETRON 2 MG/ML
4 INJECTION INTRAMUSCULAR; INTRAVENOUS DAILY PRN
Status: DISCONTINUED | OUTPATIENT
Start: 2020-09-17 | End: 2020-09-18

## 2020-09-17 RX ORDER — FENTANYL CITRATE 50 UG/ML
25 INJECTION, SOLUTION INTRAMUSCULAR; INTRAVENOUS EVERY 5 MIN PRN
Status: DISCONTINUED | OUTPATIENT
Start: 2020-09-17 | End: 2020-09-18

## 2020-09-17 RX ORDER — ACETAMINOPHEN 10 MG/ML
INJECTION, SOLUTION INTRAVENOUS
Status: DISCONTINUED | OUTPATIENT
Start: 2020-09-17 | End: 2020-09-18

## 2020-09-17 RX ORDER — SODIUM CHLORIDE 0.9 % (FLUSH) 0.9 %
10 SYRINGE (ML) INJECTION
Status: DISCONTINUED | OUTPATIENT
Start: 2020-09-17 | End: 2020-10-13 | Stop reason: HOSPADM

## 2020-09-17 RX ADMIN — LIDOCAINE HYDROCHLORIDE 100 MG: 20 INJECTION, SOLUTION INTRAVENOUS at 09:09

## 2020-09-17 RX ADMIN — ONDANSETRON 4 MG: 2 INJECTION, SOLUTION INTRAMUSCULAR; INTRAVENOUS at 11:09

## 2020-09-17 RX ADMIN — ROCURONIUM BROMIDE 50 MG: 10 INJECTION, SOLUTION INTRAVENOUS at 10:09

## 2020-09-17 RX ADMIN — SODIUM CHLORIDE 500 ML: 0.9 INJECTION, SOLUTION INTRAVENOUS at 08:09

## 2020-09-17 RX ADMIN — ACETAMINOPHEN 1000 MG: 325 TABLET ORAL at 12:09

## 2020-09-17 RX ADMIN — Medication 20 MG: at 09:09

## 2020-09-17 RX ADMIN — DEXMEDETOMIDINE HYDROCHLORIDE 4 MCG: 100 INJECTION, SOLUTION, CONCENTRATE INTRAVENOUS at 11:09

## 2020-09-17 RX ADMIN — PIPERACILLIN SODIUM AND TAZOBACTAM SODIUM 4.5 G: 4; .5 INJECTION, POWDER, LYOPHILIZED, FOR SOLUTION INTRAVENOUS at 04:09

## 2020-09-17 RX ADMIN — ACETAMINOPHEN 1000 MG: 10 INJECTION, SOLUTION INTRAVENOUS at 10:09

## 2020-09-17 RX ADMIN — FENTANYL CITRATE 50 MCG: 50 INJECTION, SOLUTION INTRAMUSCULAR; INTRAVENOUS at 10:09

## 2020-09-17 RX ADMIN — IOHEXOL 125 ML: 350 INJECTION, SOLUTION INTRAVENOUS at 07:09

## 2020-09-17 RX ADMIN — SUCCINYLCHOLINE CHLORIDE 120 MG: 20 INJECTION, SOLUTION INTRAMUSCULAR; INTRAVENOUS at 09:09

## 2020-09-17 RX ADMIN — ROCURONIUM BROMIDE 20 MG: 10 INJECTION, SOLUTION INTRAVENOUS at 11:09

## 2020-09-17 RX ADMIN — DEXAMETHASONE SODIUM PHOSPHATE 8 MG: 4 INJECTION, SOLUTION INTRAMUSCULAR; INTRAVENOUS at 09:09

## 2020-09-17 RX ADMIN — Medication 10 MG: at 11:09

## 2020-09-17 RX ADMIN — FOLIC ACID 1 MG: 1 TABLET ORAL at 11:09

## 2020-09-17 RX ADMIN — IOHEXOL 15 ML: 350 INJECTION, SOLUTION INTRAVENOUS at 04:09

## 2020-09-17 RX ADMIN — PROPOFOL 150 MG: 10 INJECTION, EMULSION INTRAVENOUS at 09:09

## 2020-09-17 RX ADMIN — SODIUM CHLORIDE, SODIUM GLUCONATE, SODIUM ACETATE, POTASSIUM CHLORIDE, MAGNESIUM CHLORIDE, SODIUM PHOSPHATE, DIBASIC, AND POTASSIUM PHOSPHATE: .53; .5; .37; .037; .03; .012; .00082 INJECTION, SOLUTION INTRAVENOUS at 09:09

## 2020-09-17 RX ADMIN — FAMOTIDINE 20 MG: 20 TABLET, FILM COATED ORAL at 11:09

## 2020-09-17 RX ADMIN — MIDAZOLAM HYDROCHLORIDE 1 MG: 1 INJECTION, SOLUTION INTRAMUSCULAR; INTRAVENOUS at 09:09

## 2020-09-17 RX ADMIN — ACETAMINOPHEN 1000 MG: 325 TABLET ORAL at 05:09

## 2020-09-17 RX ADMIN — FENTANYL CITRATE 50 MCG: 50 INJECTION, SOLUTION INTRAMUSCULAR; INTRAVENOUS at 09:09

## 2020-09-17 NOTE — CARE UPDATE
Rapid Response Nurse Chart Check     Chart check completed, abnormal VS noted. LYNNE Song contacted. No concerns verbalized at this time. Instructed to call [phone] for further concerns or assistance.

## 2020-09-17 NOTE — PROGRESS NOTES
Ostomy care follow up:     Attempted fourth ostomy teaching today. Patient was visibly upset about NG tube and PICC placement that occurred today and asked that I come back another time. Notified nursing and MD team regarding today's attempted training. Will re-attempt visit at a another time. Wound Ostomy to continue to follow pt PRN [id number].

## 2020-09-17 NOTE — PLAN OF CARE
Plan of care reviewed with pt. Pt AAOx4, VS as charted. Patient on RA. Purposeful rounding for pt care and safety. No reports of NV or pain. No falls or injuries reported during this shift. Skin integrity as charted. Safety precautions maintained during shift- bed in low position, call light in reach, SR up x2, personal belongings in reach.

## 2020-09-17 NOTE — SUBJECTIVE & OBJECTIVE
Interval History:   Still appears weak this morning. Vomited last night after drinking clears.     Review of Systems    Objective:     Temp:  [96.6 °F (35.9 °C)-99.6 °F (37.6 °C)] 98.9 °F (37.2 °C)  Pulse:  [106-122] 112  Resp:  [16-20] 20  SpO2:  [96 %-100 %] 97 %  BP: (110-145)/(53-80) 145/70     Body mass index is 28.94 kg/m².           Drains     Drain                 Nephrostomy 08/13/20 0805 Left 12 Fr. 31 days         Nephrostomy 08/13/20 0809 Right 12 Fr. 31 days         Closed/Suction Drain 09/11/20 1832 Right Abdomen Bulb 15 Fr. 1 day         Urostomy 09/12/20 0800 LLQ 1 day                Physical Exam   Constitutional: No distress.   HENT:   Head: Normocephalic and atraumatic.   Eyes: Conjunctivae are normal. No scleral icterus.   Neck: Normal range of motion.   Cardiovascular: Normal rate.    Pulmonary/Chest: Effort normal. No respiratory distress.   Abdominal: Soft. She exhibits no distension. There is abdominal tenderness (appropriate). There is no rebound and no guarding.   Urostomy p/p/p draining clear yellow urine  Ureteral stents in place  ORESTES draining ss   Musculoskeletal: Normal range of motion.      Comments: SCDs in place   Neurological: She is alert.   Skin: Skin is warm and dry. She is not diaphoretic.         Significant Labs:    BMP:  Recent Labs   Lab 09/14/20  0402 09/15/20  0518 09/16/20  0430    142 143   K 3.7 3.7 4.0    107 108   CO2 20* 24 25   BUN 10 12 20   CREATININE 0.8 0.9 1.0   CALCIUM 8.3* 8.7 8.3*       CBC:   Recent Labs   Lab 09/14/20  0403 09/15/20  0518 09/16/20  0429   WBC 10.46 5.00 10.40   HGB 8.1* 9.2* 7.8*   HCT 26.8* 30.9* 26.2*    260 268       All pertinent labs results from the past 24 hours have been reviewed.    Significant Imaging:  All pertinent imaging results/findings from the past 24 hours have been reviewed.

## 2020-09-17 NOTE — PT/OT/SLP PROGRESS
Physical Therapy      Patient Name:  Edita Hardin [facility]   MRN:  [id number]    Attempted to see patient for PT; however patient refusing secondary to pain in AM and again when returned in PM. Physical therapy will follow-up with patient tomorrow.    Fermín Davila, PT

## 2020-09-17 NOTE — CONSULTS
D/L PICC placed in L CEPHALIC vein, 39cm in length with 0cm exposed. Arm circumference 34cm. Lot#[id number]

## 2020-09-17 NOTE — PLAN OF CARE
Hematology Follow-up Note    56 yo F with recent bilateral ureteral obstruction and she is s/p urinary diversion with tranverse colon conduit on 9/11/20. We are consulted for anemia and to discus whether iron infusion as an inpatient would be appropriate. PT had cisplatin weekly from 11/1/2018 to 11/28/18. The pt is having some fatigue and weakness, and she is a Jehovahs witness.    She denies any prior oral or IV iron infusion in the past. She has also not had any prior administration of erythropoeitin.       Ferritin was noted to be 190 previously on  6/12/20    Plan:      ANemia  - noted pt's history of being a Jain  - pt's hemoglobin has been in 10-11 range; her Hgb is now 8.4   - ferritin is noted to be 135 and TIBC low, so although there may have some blood loss post-op, labs are currently not indicative of iron deficiency anemia  - await EPO to see if that would help, but this would likely also be done as an outpatient   - recommend minimal blood draws, please use pediatric tubes when possible  - hemolytic labs negative  - folate of 2.2, started 1 mg of folic acid daily  - B12 normal at 283        Counts stable enough for discharge from a hematological standpoint. EPO can require inpatient approval and so may not need to pursue if her Hemoglobin continues to stay in the high 7-8 range. Will sign off, please contact us with any questions. Discussed with attending, Dr. Gemini Patterson MD  Hematology/Oncology Fellow, PGY VI  [facility]

## 2020-09-17 NOTE — PROGRESS NOTES
[facility]  Urology  Progress Note    Patient Name: Edita Riley  MRN: [id number]  Admission Date: 9/11/2020  Hospital Length of Stay: 6 days  Code Status: Full Code   Attending Provider: Leslie Balbuena MD   Primary Care Physician: Audrey Mustafa MD    Subjective:     HPI:  Edita Riley is a 55 y.o. female with a history of cervical cancer s/p pelvic radiation. She has since developed bilateral ureteral strictures and bilateral hydronephrosis R>L. She had a MAG3 scan confirmed presence of bilateral obstruction. She is s/p open transverse colon conduit urinary diversion on 9/11/2020.     Interval History:   Still appears weak this morning. Vomited last night after drinking clears.     Review of Systems    Objective:     Temp:  [96.6 °F (35.9 °C)-99.6 °F (37.6 °C)] 98.9 °F (37.2 °C)  Pulse:  [106-122] 112  Resp:  [16-20] 20  SpO2:  [96 %-100 %] 97 %  BP: (110-145)/(53-80) 145/70     Body mass index is 28.94 kg/m².           Drains     Drain                 Nephrostomy 08/13/20 0805 Left 12 Fr. 31 days         Nephrostomy 08/13/20 0809 Right 12 Fr. 31 days         Closed/Suction Drain 09/11/20 1832 Right Abdomen Bulb 15 Fr. 1 day         Urostomy 09/12/20 0800 LLQ 1 day                Physical Exam   Constitutional: No distress.   HENT:   Head: Normocephalic and atraumatic.   Eyes: Conjunctivae are normal. No scleral icterus.   Neck: Normal range of motion.   Cardiovascular: Normal rate.    Pulmonary/Chest: Effort normal. No respiratory distress.   Abdominal: Soft. She exhibits no distension. There is abdominal tenderness (appropriate). There is no rebound and no guarding.   Urostomy p/p/p draining clear yellow urine  Ureteral stents in place  ORESTES draining ss   Musculoskeletal: Normal range of motion.      Comments: SCDs in place   Neurological: She is alert.   Skin: Skin is warm and dry. She is not diaphoretic.         Significant Labs:    BMP:  Recent Labs   Lab  09/14/20  0402 09/15/20  0518 09/16/20  0430    142 143   K 3.7 3.7 4.0    107 108   CO2 20* 24 25   BUN 10 12 20   CREATININE 0.8 0.9 1.0   CALCIUM 8.3* 8.7 8.3*       CBC:   Recent Labs   Lab 09/14/20  0403 09/15/20  0518 09/16/20  0429   WBC 10.46 5.00 10.40   HGB 8.1* 9.2* 7.8*   HCT 26.8* 30.9* 26.2*    260 268       All pertinent labs results from the past 24 hours have been reviewed.    Significant Imaging:  All pertinent imaging results/findings from the past 24 hours have been reviewed.                  Assessment/Plan:     * Bilateral ureteral obstruction  - po tylenol, PO oxycodone for pain control, toradol for 3 days - complete  - will order TPN  - given vomiting, will initiate aspiration precautions   - HLIV  - CBC, BMP, Mg, Phos daily  - Ambulate qid  - PT/OT on board, OOBTC as tolerated  - Perioperative ancef x24 hours - completed   - Wound care ostomy consult, may need more ostomy teaching  - Social work consult re: discharge planning  - DC lovenox due to drifting hgb, stable today, she is HDS  - Drains: maintain ORESTES (Cr 0.8), ureteral stents, and red rubber  - Prophylaxis: IS, SCDs, GI ppx  - will place NGT today            VTE Risk Mitigation (From admission, onward)         Ordered     IP VTE HIGH RISK PATIENT  Once      09/11/20 2024     Place sequential compression device  Until discontinued      09/11/20 2024     Place sequential compression device  Until discontinued      09/11/20 0509     Place sequential compression device  Until discontinued      09/11/20 0509                Mana Wilks MD  Urology  [facility]

## 2020-09-17 NOTE — ASSESSMENT & PLAN NOTE
- po tylenol, PO oxycodone for pain control, toradol for 3 days - complete  - will order TPN  - given vomiting, will initiate aspiration precautions   - HLIV  - CBC, BMP, Mg, Phos daily  - Ambulate qid  - PT/OT on board, OOBTC as tolerated  - Perioperative ancef x24 hours - completed   - Wound care ostomy consult, may need more ostomy teaching  - Social work consult re: discharge planning  - DC lovenox due to drifting hgb, stable today, she is HDS  - Drains: maintain ORESTES (Cr 0.8), ureteral stents, and red rubber  - Prophylaxis: IS, SCDs, GI ppx  - will place NGT today

## 2020-09-18 LAB
ALBUMIN SERPL BCP-MCNC: 1.7 G/DL (ref 3.5–5.2)
ALP SERPL-CCNC: 80 U/L (ref 55–135)
ALT SERPL W/O P-5'-P-CCNC: 10 U/L (ref 10–44)
ANION GAP SERPL CALC-SCNC: 14 MMOL/L (ref 8–16)
ANION GAP SERPL CALC-SCNC: 15 MMOL/L (ref 8–16)
ANISOCYTOSIS BLD QL SMEAR: SLIGHT
ANISOCYTOSIS BLD QL SMEAR: SLIGHT
AST SERPL-CCNC: 18 U/L (ref 10–40)
BASOPHILS # BLD AUTO: 0.01 K/UL (ref 0–0.2)
BASOPHILS # BLD AUTO: 0.04 K/UL (ref 0–0.2)
BASOPHILS NFR BLD: 0.2 % (ref 0–1.9)
BASOPHILS NFR BLD: 0.4 % (ref 0–1.9)
BILIRUB SERPL-MCNC: 0.5 MG/DL (ref 0.1–1)
BUN SERPL-MCNC: 21 MG/DL (ref 6–20)
BUN SERPL-MCNC: 23 MG/DL (ref 6–20)
CALCIUM SERPL-MCNC: 7.9 MG/DL (ref 8.7–10.5)
CALCIUM SERPL-MCNC: 8.2 MG/DL (ref 8.7–10.5)
CHLORIDE SERPL-SCNC: 112 MMOL/L (ref 95–110)
CHLORIDE SERPL-SCNC: 112 MMOL/L (ref 95–110)
CO2 SERPL-SCNC: 18 MMOL/L (ref 23–29)
CO2 SERPL-SCNC: 18 MMOL/L (ref 23–29)
CREAT SERPL-MCNC: 0.9 MG/DL (ref 0.5–1.4)
CREAT SERPL-MCNC: 0.9 MG/DL (ref 0.5–1.4)
DIFFERENTIAL METHOD: ABNORMAL
DIFFERENTIAL METHOD: ABNORMAL
DOHLE BOD BLD QL SMEAR: PRESENT
EOSINOPHIL # BLD AUTO: 0 K/UL (ref 0–0.5)
EOSINOPHIL # BLD AUTO: 0 K/UL (ref 0–0.5)
EOSINOPHIL NFR BLD: 0.1 % (ref 0–8)
EOSINOPHIL NFR BLD: 0.2 % (ref 0–8)
ERYTHROCYTE [DISTWIDTH] IN BLOOD BY AUTOMATED COUNT: 16.5 % (ref 11.5–14.5)
ERYTHROCYTE [DISTWIDTH] IN BLOOD BY AUTOMATED COUNT: 16.7 % (ref 11.5–14.5)
EST. GFR  (AFRICAN AMERICAN): >60 ML/MIN/1.73 M^2
EST. GFR  (AFRICAN AMERICAN): >60 ML/MIN/1.73 M^2
EST. GFR  (NON AFRICAN AMERICAN): >60 ML/MIN/1.73 M^2
EST. GFR  (NON AFRICAN AMERICAN): >60 ML/MIN/1.73 M^2
GLUCOSE SERPL-MCNC: 155 MG/DL (ref 70–110)
GLUCOSE SERPL-MCNC: 158 MG/DL (ref 70–110)
HCT VFR BLD AUTO: 27.9 % (ref 37–48.5)
HCT VFR BLD AUTO: 29.1 % (ref 37–48.5)
HGB BLD-MCNC: 8.4 G/DL (ref 12–16)
HGB BLD-MCNC: 8.7 G/DL (ref 12–16)
HYPOCHROMIA BLD QL SMEAR: ABNORMAL
HYPOCHROMIA BLD QL SMEAR: ABNORMAL
IMM GRANULOCYTES # BLD AUTO: 0.03 K/UL (ref 0–0.04)
IMM GRANULOCYTES # BLD AUTO: 0.09 K/UL (ref 0–0.04)
IMM GRANULOCYTES NFR BLD AUTO: 0.5 % (ref 0–0.5)
IMM GRANULOCYTES NFR BLD AUTO: 0.8 % (ref 0–0.5)
LACTATE SERPL-SCNC: 1.2 MMOL/L (ref 0.5–2.2)
LYMPHOCYTES # BLD AUTO: 0.6 K/UL (ref 1–4.8)
LYMPHOCYTES # BLD AUTO: 0.7 K/UL (ref 1–4.8)
LYMPHOCYTES NFR BLD: 11.8 % (ref 18–48)
LYMPHOCYTES NFR BLD: 5.2 % (ref 18–48)
MAGNESIUM SERPL-MCNC: 2 MG/DL (ref 1.6–2.6)
MCH RBC QN AUTO: 26.5 PG (ref 27–31)
MCH RBC QN AUTO: 26.6 PG (ref 27–31)
MCHC RBC AUTO-ENTMCNC: 29.9 G/DL (ref 32–36)
MCHC RBC AUTO-ENTMCNC: 30.1 G/DL (ref 32–36)
MCV RBC AUTO: 88 FL (ref 82–98)
MCV RBC AUTO: 89 FL (ref 82–98)
MONOCYTES # BLD AUTO: 0.4 K/UL (ref 0.3–1)
MONOCYTES # BLD AUTO: 0.6 K/UL (ref 0.3–1)
MONOCYTES NFR BLD: 5.5 % (ref 4–15)
MONOCYTES NFR BLD: 6.3 % (ref 4–15)
NEUTROPHILS # BLD AUTO: 4.9 K/UL (ref 1.8–7.7)
NEUTROPHILS # BLD AUTO: 9.6 K/UL (ref 1.8–7.7)
NEUTROPHILS NFR BLD: 81 % (ref 38–73)
NEUTROPHILS NFR BLD: 88 % (ref 38–73)
NRBC BLD-RTO: 0 /100 WBC
NRBC BLD-RTO: 2 /100 WBC
OVALOCYTES BLD QL SMEAR: ABNORMAL
PHOSPHATE SERPL-MCNC: 4.7 MG/DL (ref 2.7–4.5)
PLATELET # BLD AUTO: 358 K/UL (ref 150–350)
PLATELET # BLD AUTO: 374 K/UL (ref 150–350)
PLATELET BLD QL SMEAR: ABNORMAL
PMV BLD AUTO: 10 FL (ref 9.2–12.9)
PMV BLD AUTO: 9.5 FL (ref 9.2–12.9)
POIKILOCYTOSIS BLD QL SMEAR: SLIGHT
POLYCHROMASIA BLD QL SMEAR: ABNORMAL
POTASSIUM SERPL-SCNC: 3.7 MMOL/L (ref 3.5–5.1)
POTASSIUM SERPL-SCNC: 3.9 MMOL/L (ref 3.5–5.1)
PREALB SERPL-MCNC: 4 MG/DL (ref 20–43)
PROT SERPL-MCNC: 5.2 G/DL (ref 6–8.4)
RBC # BLD AUTO: 3.16 M/UL (ref 4–5.4)
RBC # BLD AUTO: 3.28 M/UL (ref 4–5.4)
SODIUM SERPL-SCNC: 144 MMOL/L (ref 136–145)
SODIUM SERPL-SCNC: 145 MMOL/L (ref 136–145)
WBC # BLD AUTO: 10.85 K/UL (ref 3.9–12.7)
WBC # BLD AUTO: 6.01 K/UL (ref 3.9–12.7)

## 2020-09-18 PROCEDURE — 25000003 PHARM REV CODE 250: Performed by: STUDENT IN AN ORGANIZED HEALTH CARE EDUCATION/TRAINING PROGRAM

## 2020-09-18 PROCEDURE — 84134 ASSAY OF PREALBUMIN: CPT

## 2020-09-18 PROCEDURE — 83735 ASSAY OF MAGNESIUM: CPT

## 2020-09-18 PROCEDURE — 94770 HC EXHALED C02 TEST: CPT

## 2020-09-18 PROCEDURE — 63600175 PHARM REV CODE 636 W HCPCS: Performed by: STUDENT IN AN ORGANIZED HEALTH CARE EDUCATION/TRAINING PROGRAM

## 2020-09-18 PROCEDURE — 94761 N-INVAS EAR/PLS OXIMETRY MLT: CPT

## 2020-09-18 PROCEDURE — A4217 STERILE WATER/SALINE, 500 ML: HCPCS | Performed by: STUDENT IN AN ORGANIZED HEALTH CARE EDUCATION/TRAINING PROGRAM

## 2020-09-18 PROCEDURE — 99233 PR SUBSEQUENT HOSPITAL CARE,LEVL III: ICD-10-PCS | Mod: ,,, | Performed by: SURGERY

## 2020-09-18 PROCEDURE — C9113 INJ PANTOPRAZOLE SODIUM, VIA: HCPCS | Performed by: STUDENT IN AN ORGANIZED HEALTH CARE EDUCATION/TRAINING PROGRAM

## 2020-09-18 PROCEDURE — 88307 TISSUE EXAM BY PATHOLOGIST: CPT | Performed by: PATHOLOGY

## 2020-09-18 PROCEDURE — 80053 COMPREHEN METABOLIC PANEL: CPT

## 2020-09-18 PROCEDURE — 88307 TISSUE EXAM BY PATHOLOGIST: CPT | Mod: 26,,, | Performed by: PATHOLOGY

## 2020-09-18 PROCEDURE — A4216 STERILE WATER/SALINE, 10 ML: HCPCS | Performed by: STUDENT IN AN ORGANIZED HEALTH CARE EDUCATION/TRAINING PROGRAM

## 2020-09-18 PROCEDURE — 85025 COMPLETE CBC W/AUTO DIFF WBC: CPT | Mod: 91

## 2020-09-18 PROCEDURE — 88307 PR  SURG PATH,LEVEL V: ICD-10-PCS | Mod: 26,,, | Performed by: PATHOLOGY

## 2020-09-18 PROCEDURE — 83605 ASSAY OF LACTIC ACID: CPT

## 2020-09-18 PROCEDURE — 99900035 HC TECH TIME PER 15 MIN (STAT)

## 2020-09-18 PROCEDURE — 36415 COLL VENOUS BLD VENIPUNCTURE: CPT

## 2020-09-18 PROCEDURE — 84100 ASSAY OF PHOSPHORUS: CPT

## 2020-09-18 PROCEDURE — 11000001 HC ACUTE MED/SURG PRIVATE ROOM

## 2020-09-18 PROCEDURE — 80048 BASIC METABOLIC PNL TOTAL CA: CPT

## 2020-09-18 PROCEDURE — 99233 SBSQ HOSP IP/OBS HIGH 50: CPT | Mod: ,,, | Performed by: SURGERY

## 2020-09-18 PROCEDURE — B4185 PARENTERAL SOL 10 GM LIPIDS: HCPCS | Performed by: STUDENT IN AN ORGANIZED HEALTH CARE EDUCATION/TRAINING PROGRAM

## 2020-09-18 PROCEDURE — 25000003 PHARM REV CODE 250: Performed by: NURSE ANESTHETIST, CERTIFIED REGISTERED

## 2020-09-18 RX ORDER — HYDROMORPHONE HCL IN 0.9% NACL 6 MG/30 ML
PATIENT CONTROLLED ANALGESIA SYRINGE INTRAVENOUS
Status: DISPENSED
Start: 2020-09-18 | End: 2020-09-18

## 2020-09-18 RX ORDER — HYDROMORPHONE HCL IN 0.9% NACL 6 MG/30 ML
PATIENT CONTROLLED ANALGESIA SYRINGE INTRAVENOUS CONTINUOUS
Status: DISCONTINUED | OUTPATIENT
Start: 2020-09-18 | End: 2020-09-25

## 2020-09-18 RX ORDER — PANTOPRAZOLE SODIUM 40 MG/10ML
40 INJECTION, POWDER, LYOPHILIZED, FOR SOLUTION INTRAVENOUS DAILY
Status: DISCONTINUED | OUTPATIENT
Start: 2020-09-18 | End: 2020-10-11

## 2020-09-18 RX ORDER — NALOXONE HCL 0.4 MG/ML
0.02 VIAL (ML) INJECTION
Status: DISCONTINUED | OUTPATIENT
Start: 2020-09-18 | End: 2020-09-25

## 2020-09-18 RX ADMIN — SUGAMMADEX 200 MG: 100 INJECTION, SOLUTION INTRAVENOUS at 12:09

## 2020-09-18 RX ADMIN — SODIUM CHLORIDE: 0.9 INJECTION, SOLUTION INTRAVENOUS at 12:09

## 2020-09-18 RX ADMIN — Medication 10 ML: at 01:09

## 2020-09-18 RX ADMIN — PANTOPRAZOLE SODIUM 40 MG: 40 INJECTION, POWDER, LYOPHILIZED, FOR SOLUTION INTRAVENOUS at 09:09

## 2020-09-18 RX ADMIN — MAGNESIUM SULFATE HEPTAHYDRATE: 500 INJECTION, SOLUTION INTRAMUSCULAR; INTRAVENOUS at 10:09

## 2020-09-18 RX ADMIN — PIPERACILLIN SODIUM AND TAZOBACTAM SODIUM 4.5 G: 4; .5 INJECTION, POWDER, LYOPHILIZED, FOR SOLUTION INTRAVENOUS at 01:09

## 2020-09-18 RX ADMIN — PIPERACILLIN SODIUM AND TAZOBACTAM SODIUM 4.5 G: 4; .5 INJECTION, POWDER, LYOPHILIZED, FOR SOLUTION INTRAVENOUS at 05:09

## 2020-09-18 RX ADMIN — SODIUM CHLORIDE: 0.9 INJECTION, SOLUTION INTRAVENOUS at 10:09

## 2020-09-18 RX ADMIN — IRON SUCROSE 200 MG: 20 INJECTION, SOLUTION INTRAVENOUS at 10:09

## 2020-09-18 RX ADMIN — Medication 10 ML: at 05:09

## 2020-09-18 RX ADMIN — Medication 10 ML: at 12:09

## 2020-09-18 RX ADMIN — Medication: at 12:09

## 2020-09-18 RX ADMIN — PIPERACILLIN SODIUM AND TAZOBACTAM SODIUM 4.5 G: 4; .5 INJECTION, POWDER, LYOPHILIZED, FOR SOLUTION INTRAVENOUS at 09:09

## 2020-09-18 RX ADMIN — I.V. FAT EMULSION 250 ML: 20 EMULSION INTRAVENOUS at 10:09

## 2020-09-18 NOTE — PROGRESS NOTES
[facility]  Urology  Progress Note    Patient Name: Edita Riley  MRN: [id number]  Admission Date: 9/11/2020  Hospital Length of Stay: 7 days  Code Status: Full Code   Attending Provider: Leslie Balbuena MD   Primary Care Physician: Audrey Mustafa MD    Subjective:     HPI:  Edita Riley is a 55 y.o. female with a history of cervical cancer s/p pelvic radiation. She has since developed bilateral ureteral strictures and bilateral hydronephrosis R>L. She had a MAG3 scan confirmed presence of bilateral obstruction. She is s/p open transverse colon conduit urinary diversion on 9/11/2020.     Interval History:   Taken emergently to OR last night, colo-colic anastomosis intact, bowel perforation found, underwent resection of right colon, remaining transverses colon, and proximal descending colon    Review of Systems    Objective:     Temp:  [97.5 °F (36.4 °C)-101 °F (38.3 °C)] 98.8 °F (37.1 °C)  Pulse:  [102-124] 104  Resp:  [18-37] 20  SpO2:  [95 %-100 %] 100 %  BP: (115-167)/(60-81) 151/81     Body mass index is 28.94 kg/m².           Drains     Drain                 Nephrostomy 08/13/20 0805 Left 12 Fr. 31 days         Nephrostomy 08/13/20 0809 Right 12 Fr. 31 days         Closed/Suction Drain 09/11/20 1832 Right Abdomen Bulb 15 Fr. 1 day         Urostomy 09/12/20 0800 LLQ 1 day                Physical Exam   Constitutional: No distress.   HENT:   Head: Normocephalic and atraumatic.   Eyes: Conjunctivae are normal. No scleral icterus.   Neck: Normal range of motion.   Cardiovascular: Normal rate.    Pulmonary/Chest: Effort normal. No respiratory distress.   Abdominal: Soft. She exhibits no distension. There is abdominal tenderness (appropriate). There is no rebound and no guarding.   Urostomy p/p/p draining clear yellow urine  Ureteral stents in place  Ileostomy on RLQ with bowel sweat present    Musculoskeletal: Normal range of motion.      Comments: SCDs in place    Neurological: She is alert.   Skin: Skin is warm and dry. She is not diaphoretic.         Significant Labs:    BMP:  Recent Labs   Lab 09/17/20  1544 09/18/20  0022 09/18/20  0449   * 144 145   K 3.8 3.7 3.9   * 112* 112*   CO2 22* 18* 18*   BUN 23* 23* 21*   CREATININE 0.9 0.9 0.9   CALCIUM 9.0 7.9* 8.2*       CBC:   Recent Labs   Lab 09/17/20  1545 09/18/20  0022 09/18/20  0449   WBC 8.72 6.01 10.85   HGB 8.4* 8.4* 8.7*   HCT 29.3* 27.9* 29.1*    358* 374*       All pertinent labs results from the past 24 hours have been reviewed.    Significant Imaging:  All pertinent imaging results/findings from the past 24 hours have been reviewed.                  Assessment/Plan:     * Bilateral ureteral obstruction  S/p urinary diversion with colon conduit 9/11   S/p emergent ex-lap 9/17, colo-colic anastomosis intact, bowel perforation found, underwent resection of right colon, remaining transverses colon, and proximal descending colon, Charlotte's pouch created   - admitted to SICU  - dilauded PCA   - CBC, BMP, Mg, Phos daily  - PT/OT on board, OOBTC as tolerated, encourage ambulation   - Wound care ostomy consulted for assistance with ostomy teaching   - patient is a [employer], will discuss iron infusion with hematology   - Drains: maintain ORESTES (Cr 0.8), ureteral stents, and red rubber  - Prophylaxis: IS, SCDs, GI ppx            VTE Risk Mitigation (From admission, onward)         Ordered     IP VTE HIGH RISK PATIENT  Once      09/11/20 2024     Place sequential compression device  Until discontinued      09/11/20 2024                Mana Wilks MD  Urology  [facility]

## 2020-09-18 NOTE — CARE UPDATE
Rapid Response Nurse Chart Check     Chart check completed, abnormal VS noted. @ 1933 - Temperature 101.0, . Tylenol to be given per MAR. Please call [phone] for further concerns or assistance.

## 2020-09-18 NOTE — PT/OT/SLP PROGRESS
Occupational Therapy      Patient Name:  Edita Hardin [facility]   MRN:  [id number]    Patient not seen today secondary to RN hold to pt still lethargic. Will follow-up 9/19/2020.    Danielle Funes OT  9/18/2020

## 2020-09-18 NOTE — SUBJECTIVE & OBJECTIVE
Interval History:   Taken emergently to OR last night, colo-colic anastomosis intact, bowel perforation found, underwent resection of right colon, remaining transverses colon, and proximal descending colon    Review of Systems    Objective:     Temp:  [97.5 °F (36.4 °C)-101 °F (38.3 °C)] 98.8 °F (37.1 °C)  Pulse:  [102-124] 104  Resp:  [18-37] 20  SpO2:  [95 %-100 %] 100 %  BP: (115-167)/(60-81) 151/81     Body mass index is 28.94 kg/m².           Drains     Drain                 Nephrostomy 08/13/20 0805 Left 12 Fr. 31 days         Nephrostomy 08/13/20 0809 Right 12 Fr. 31 days         Closed/Suction Drain 09/11/20 1832 Right Abdomen Bulb 15 Fr. 1 day         Urostomy 09/12/20 0800 LLQ 1 day                Physical Exam   Constitutional: No distress.   HENT:   Head: Normocephalic and atraumatic.   Eyes: Conjunctivae are normal. No scleral icterus.   Neck: Normal range of motion.   Cardiovascular: Normal rate.    Pulmonary/Chest: Effort normal. No respiratory distress.   Abdominal: Soft. She exhibits no distension. There is abdominal tenderness (appropriate). There is no rebound and no guarding.   Urostomy p/p/p draining clear yellow urine  Ureteral stents in place  Ileostomy on RLQ with bowel sweat present    Musculoskeletal: Normal range of motion.      Comments: SCDs in place   Neurological: She is alert.   Skin: Skin is warm and dry. She is not diaphoretic.         Significant Labs:    BMP:  Recent Labs   Lab 09/17/20  1544 09/18/20  0022 09/18/20  0449   * 144 145   K 3.8 3.7 3.9   * 112* 112*   CO2 22* 18* 18*   BUN 23* 23* 21*   CREATININE 0.9 0.9 0.9   CALCIUM 9.0 7.9* 8.2*       CBC:   Recent Labs   Lab 09/17/20  1545 09/18/20  0022 09/18/20  0449   WBC 8.72 6.01 10.85   HGB 8.4* 8.4* 8.7*   HCT 29.3* 27.9* 29.1*    358* 374*       All pertinent labs results from the past 24 hours have been reviewed.    Significant Imaging:  All pertinent imaging results/findings from the past 24 hours  have been reviewed.

## 2020-09-18 NOTE — PT/OT/SLP PROGRESS
Physical Therapy      Patient Name:  Edita Hardin [facility]   MRN:  [id number]    Patient not seen in AM secondary to off the floor for surgery and unable to see in PM secondary to RN hold as pt still lethargic. Will follow-up 9/19/2020.    Fermín Davila, PT

## 2020-09-18 NOTE — PLAN OF CARE
Patient AAOX4, call light and belongings in reach, siderails up x2, bedalarm set, scds on and operating.  Abdominal surgical site, c/di, colostomy intact no output, ileostomy intact output documented.  NGT to LIWS output charted, HOB elevated.  VISI monitor on and operating, vitals charted.  Patient remains NPO.  Pain controlled with dilaudid PCA pump continued reminding patient to push button. Patient remains free from falls and safety maintained this  shift.

## 2020-09-18 NOTE — NURSING TRANSFER
Nursing Transfer Note      9/18/2020     Transfer To: 524    Transfer via bed    Transfer with IVF infusing, ETCO2 monitor, IVF infusing, PCA infusion    Transported by Pt transport    Medicines sent: None    Chart send with patient: Yes    Notified: spouse    Patient reassessed at: 9/18/20 1000    Upon arrival to floor: patient oriented to room, call bell in reach and bed in lowest position

## 2020-09-18 NOTE — BRIEF OP NOTE
[facility]  Brief Operative Note    Surgery Date: 9/17/2020     Surgeon(s) and Role:     * Hammad Reynolds MD - Primary     * Leslie Balbuena MD - Assisting     * Moe Mclean MD - Fellow        Pre-op Diagnosis:  Generalized abdominal pain [R10.84]    Post-op Diagnosis:  Post-Op Diagnosis Codes:     * Generalized abdominal pain [R10.84]    Procedure(s):  COLECTOMY, RIGHT Extended  CREATION, ILEOSTOMY    Anesthesia: * No anesthesia type entered *    Description of the findings of the procedure(s): focal area of perforation 5cm distal to colo-colic anastomosis. Anastomosis itself intact. Colonic urinary conduit also intact. Right colon, remaining transverse, and proximal descending colon resected. End-ileostomy matured. Long sánchez's pouch left.    Estimated Blood Loss: 100 mL         Specimens:   Specimen (12h ago, onward)    None

## 2020-09-18 NOTE — PROGRESS NOTES
Urology Progress Note    This afternoon patient was noted to have feculent drainage from ORESTES drain that was new from this morning. Also now with fever to 101 and tachycardia. Patient normotensive. Patient was started on IV Zosyn. CT with oral and IV contrast was ordered this afternoon and resulted concerning for colonic anastomotic leak.     CRS to take to OR for exploratory laparotomy, urology service will be present    Julius Villegas MD  Urology Pgy-4  [phone]

## 2020-09-18 NOTE — TRANSFER OF CARE
"Anesthesia Transfer of Care Note    [facility]    Procedure(s) Performed: Procedure(s):  COLECTOMY, RIGHT Extended  CREATION, ILEOSTOMY    Patient location: PACU    Anesthesia Type: general    Transport from OR: Transported from OR on 6-10 L/min O2 by face mask with adequate spontaneous ventilation    Post pain: adequate analgesia    Post assessment: no apparent anesthetic complications and tolerated procedure well    Post vital signs: stable    Level of consciousness: sedated and responds to stimulation    Nausea/Vomiting: no nausea/vomiting    Complications: none    Transfer of care protocol was followed      Last vitals:   Visit Vitals  /64 (BP Location: Right arm, Patient Position: Lying)   Pulse 109   Temp 37.2 °C (99 °F) (Temporal)   Resp (!) 33   Ht 5' 9" (1.753 m)   Wt 88.9 kg (196 lb)   LMP 06/08/2014 (Approximate)   SpO2 100%   Breastfeeding No   BMI 28.94 kg/m²     "

## 2020-09-18 NOTE — ASSESSMENT & PLAN NOTE
S/p urinary diversion with colon conduit 9/11   S/p emergent ex-lap 9/17, colo-colic anastomosis intact, bowel perforation found, underwent resection of right colon, remaining transverses colon, and proximal descending colon, Charlotte's pouch created   - admitted to SICU  - dilauded PCA   - CBC, BMP, Mg, Phos daily  - PT/OT on board, OOBTC as tolerated, encourage ambulation   - Wound care ostomy consulted for assistance with ostomy teaching   - patient is a [employer], will discuss iron infusion with hematology   - Drains: maintain ORESTES (Cr 0.8), ureteral stents, and red rubber  - Prophylaxis: IS, SCDs, GI ppx

## 2020-09-18 NOTE — ANESTHESIA PROCEDURE NOTES
Intubation  Performed by: Stephanie Douglass CRNA  Authorized by: Guerda Santos MD     Intubation:     Induction:  Intravenous    Intubated:  Postinduction    Mask Ventilation:  Easy mask    Attempts:  2    Attempted By:  CRNA    Method of Intubation:  Video laryngoscopy    Blade:  Patricia 3    Laryngeal View Grade: Grade I - full view of chords      Laryngeal View Grade comment:  Unable to advance tube past aretenoids and unable to pass eschmaan past aretenoids    Attempted By (2nd Attempt):  Staff anesthesiologist    Method of Intubation (2nd Attempt):  Video laryngoscopy    Laryngeal View Grade (2nd Attempt): Grade I - full view of cords      Laryngeal View Grade (2nd Attempt) comment:  Difficulty passing the ett,evenutally obtained with cricoid pressure and repositioning    Difficult Airway Encountered?: Yes      Complications:  None    Airway Device:  Oral endotracheal tube    Airway Device Size:  7.0    Style/Cuff Inflation:  Cuffed    Tube secured:  24    Placement Verified By:  Capnometry    Complicating Factors:  Anterior larynx    Findings Post-Intubation:  BS equal bilateral

## 2020-09-18 NOTE — ANESTHESIA PREPROCEDURE EVALUATION
09/17/2020  Edita Riley is a 55 y.o., female who is  POD 6 for transverse colon conduit. Now has anastomotic leak and feculent material in drain. Scheduled for Ex-lap.     Anesthesia Evaluation    I have reviewed the Patient Summary Reports.   I have reviewed the NPO Status.      Review of Systems  Anesthesia Hx:  History of prior surgery of interest to airway management or planning: Previous anesthesia: General Denies Family Hx of Anesthesia complications.   Denies Personal Hx of Anesthesia complications.   Social:  Former Smoker    Cardiovascular:   Hypertension    Pulmonary:   Denies COPD.  Denies Asthma.  Denies Sleep Apnea.    Renal/:   Chronic Renal Disease    Hepatic/GI:   Hiatal Hernia, GERD    Musculoskeletal:   Arthritis     Neurological:   CVA Neuromuscular Disease,    Psych:   Psychiatric History          Physical Exam  General:  Well nourished    Airway/Jaw/Neck:  Airway Findings: Mouth Opening: Small, but > 3cm Tongue: Normal  General Airway Assessment: Adult  Mallampati: II  TM Distance: Normal, at least 6 cm     Eyes/Ears/Nose:  Eyes/Ears/Nose Findings: NGT - right nare    Dental:  DENTAL FINDINGS: Normal   Chest/Lungs:  Chest/Lungs Findings: Clear to auscultation, Normal Respiratory Rate     Heart/Vascular:  Heart Findings: Rate: Tachycardia  Rhythm: Regular Rhythm        Mental Status:  Mental Status Findings:  Cooperative, Alert and Oriented         Anesthesia Plan  Type of Anesthesia, risks & benefits discussed:  Anesthesia Type:  general  Patient's Preference:   Intra-op Monitoring Plan: standard ASA monitors  Intra-op Monitoring Plan Comments:   Post Op Pain Control Plan:   Post Op Pain Control Plan Comments:   Induction:   IV  Beta Blocker:  Patient is not currently on a Beta-Blocker (No further documentation required).       Informed Consent: Patient understands risks  and agrees with Anesthesia plan.  Questions answered. Anesthesia consent signed with patient.  ASA Score: 3     Day of Surgery Review of History & Physical:    H&P update referred to the surgeon.     Anesthesia Plan Notes: Patient is [employer]. I confirmed with her, that she will accept plasma and albumin. She does consent to rbcs.         Ready For Surgery From Anesthesia Perspective.

## 2020-09-18 NOTE — PROGRESS NOTES
Ostomy care follow up:     Patient off floor in surgery for creation of RUQ end ileostomy. Will follow up with patient at another time for ostomy teaching. MD team notified. Ostomy care to continue to follow pt PRN [id number]

## 2020-09-18 NOTE — ANESTHESIA POSTPROCEDURE EVALUATION
Anesthesia Post Evaluation    [facility]    Procedure(s) Performed: Procedure(s):  COLECTOMY, RIGHT Extended  CREATION, ILEOSTOMY    Final Anesthesia Type: general    Patient location during evaluation: PACU  Patient participation: Yes- Able to Participate  Level of consciousness: awake and responds to stimulation  Post-procedure vital signs: reviewed and stable  Pain management: adequate  Airway patency: patent  JENN mitigation strategies: Extubation and recovery carried out in lateral, semiupright, or other nonsupine position  PONV status at discharge: No PONV  Anesthetic complications: no      Cardiovascular status: hemodynamically stable  Respiratory status: room air and spontaneous ventilation  Hydration status: euvolemic  Follow-up not needed.          Vitals Value Taken Time   /67 09/18/20 0502   Temp 37.1 °C (98.8 °F) 09/18/20 0300   Pulse 104 09/18/20 0600   Resp 20 09/18/20 0600   SpO2 100 % 09/18/20 0600   Vitals shown include unvalidated device data.      Event Time   Out of Recovery 09/18/2020 01:00:00         Pain/Caitie Score: Pain Rating Prior to Med Admin: 0 (9/18/2020 12:40 AM)  Pain Rating Post Med Admin: 0 (9/18/2020  1:00 AM)  Caitie Score: 9 (9/18/2020  1:00 AM)

## 2020-09-18 NOTE — H&P
[facility]  Critical Care - Surgery  History & Physical    Patient Name: Edita Riley  MRN: [id number]  Admission Date: 9/11/2020  Code Status: Full Code  Attending Physician: Leslie Balbuena MD   Primary Care Provider: Audrey Mustafa MD   Principal Problem: Bilateral ureteral obstruction    Subjective:     HPI:      Edita Riley is a 55 y.o. female with a history of cervical cancer s/p pelvic radiation. She has since developed bilateral ureteral strictures and bilateral hydronephrosis R>L.   She had percutaneous nephrostomy tubes installed and they were both exchanged on 8/13 because one had stopped draining.  Now she is s/p open transverse colon conduit urinary diversion on 9/11/2020.  Developed new onset of feculent drainage via abdominal drain along with fever and tachycardia.  Plain abdominal film on 9/17 showed a small amount of pneumoperitoneum.  CT scan showed large volume pneumoperitoneum and free fluid concerning for colocolonic anastomotic leak.    Pt taken back to OR on 9/17.  Focal area of perforation 5cm distal to colo-colic anastomosis was found.  Right colon, remaining transverse, and proximal descending colon resected. End-ileostomy matured. Long sánchez's pouch left.  She now presents to SICU for continued care and observation.  This patient identifies as a Jehova's witness.  She declines PRBCs but has consented to albumin and/or plasma infusions as needed.    Follow-up For: Procedure(s):  COLECTOMY, RIGHT Extended  CREATION, ILEOSTOMY    Post-Operative Day: 1 Day Post-Op     Past Medical History:   Diagnosis Date    Abnormal mammogram 8/25/2020    Anxiety     Cervical cancer 2014    Chronic back pain     Depression     Diarrhea due to malabsorption 11/14/2018    Fibromyalgia     Generalized abdominal pain 8/25/2020    GERD (gastroesophageal reflux disease)     Hiatal hernia 2014    History of cervical cancer 10/11/2018    History of  cervical cancer 10/11/2018    Hx of psychiatric care     Cymbalta, trazodone    Hypertension     Hypomagnesemia 11/21/2018    Lactose intolerance     Metastatic squamous cell carcinoma to lymph node 10/11/2018    Nephrostomy tube displaced     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 drug 11/14/2018    Osteoarthritis of back     Psychiatric problem     Stroke 1972       Past Surgical History:   Procedure Laterality Date    BILATERAL OOPHORECTOMY  2015    CHOLECYSTECTOMY  11/09/2016    Done at [facility], path showed chronic cholecystitis and gallstones    cold knife conization  2014    COLONOSCOPY  2014    COLONOSCOPY N/A 10/24/2016    at [facility], Dr Gutiérrez    COLONOSCOPY N/A 5/18/2018    Procedure: COLONOSCOPY;  Surgeon: Arden Gutiérrez MD;  Location: [facility] ENDO (4TH FLR);  Service: Endoscopy;  Laterality: N/A;    COLONOSCOPY N/A 7/28/2020    Procedure: COLONOSCOPY;  Surgeon: Hammad Reynolds MD;  Location: [facility] ENDO (4TH FLR);  Service: Colon and Rectal;  Laterality: N/A;  covid test [place] 7/25    CYSTOSCOPY WITH URETEROSCOPY, RETROGRADE PYELOGRAPHY, AND INSERTION OF STENT Bilateral 3/21/2020    Procedure: CYSTOSCOPY, WITH RETROGRADE PYELOGRAM,;  Surgeon: Leslie Balbuena MD;  Location: [facility] OR 1ST FLR;  Service: Urology;  Laterality: Bilateral;    ESOPHAGOGASTRODUODENOSCOPY  2014    HYSTERECTOMY  2014    [facility] for cervical cancer    MOBILIZATION OF SPLENIC FLEXURE N/A 9/11/2020    Procedure: MOBILIZATION, COLONIC;  Surgeon: Hammad Reynolds MD;  Location: [facility] OR 2ND FLR;  Service: Colon and Rectal;  Laterality: N/A;    OOPHORECTOMY      RETROPERITONEAL LYMPHADENECTOMY Right 9/17/2018    Procedure: LYMPHADENECTOMY, RETROPERITONEUM;  Surgeon: Sebas Reed MD;  Location: [facility] OR 2ND FLR;  Service: General;  Laterality: Right;  ILIAC       Review of patient's allergies indicates:   Allergen Reactions    Bee sting [allergen ext-venom-honey bee]      Rash      Grass pollen-bermuda, standard       rash       Family History     Problem Relation (Age of Onset)    Breast cancer Maternal Aunt    Cancer Father, Mother    Cervical cancer Mother    Colon cancer Father    Drug abuse Daughter, Daughter    Esophageal cancer Father    Heart disease Sister, Maternal Grandmother    Suicide Daughter        Tobacco Use    Smoking status: Former Smoker    Smokeless tobacco: Never Used   Substance and Sexual Activity    Alcohol use: No     Alcohol/week: 0.0 standard drinks    Drug use: No    Sexual activity: Yes     Partners: Male     Birth control/protection: None     Comment:  19 years since 1999      Review of Systems   Unable to perform ROS: Other   Pt sedated during examination    Objective:     Vital Signs (Most Recent):  Temp: 99 °F (37.2 °C) (09/18/20 0025)  Pulse: 104 (09/18/20 0100)  Resp: (!) 30 (09/18/20 0100)  BP: 128/66 (09/18/20 0100)  SpO2: 100 % (09/18/20 0100) Vital Signs (24h Range):  Temp:  [97.5 °F (36.4 °C)-101 °F (38.3 °C)] 99 °F (37.2 °C)  Pulse:  [102-124] 104  Resp:  [18-37] 30  SpO2:  [95 %-100 %] 100 %  BP: (115-167)/(60-74) 128/66     Weight: 88.9 kg (196 lb)  Body mass index is 28.94 kg/m².      Intake/Output Summary (Last 24 hours) at 9/18/2020 0111  Last data filed at 9/18/2020 0100  Gross per 24 hour   Intake 839.58 ml   Output 1660 ml   Net -820.42 ml       Physical Exam  Constitutional:       General: She is not in acute distress.     Appearance: Normal appearance.   HENT:      Head: Normocephalic and atraumatic.      Mouth/Throat:      Mouth: Mucous membranes are moist.   Cardiovascular:      Rate and Rhythm: Normal rate and regular rhythm.      Heart sounds: Normal heart sounds. No murmur.   Pulmonary:      Effort: Pulmonary effort is normal.      Breath sounds: Normal breath sounds. No wheezing.   Abdominal:      General: There is no distension.      Palpations: Abdomen is soft.      Comments: Midline abdominal incision with bandage c/d/i  RUQ end ileostomy  LLQ nephrostomy  tube   Skin:     General: Skin is warm and dry.      Findings: No erythema.      Comments: L PICC  L&R percutaneous nephrostomy tubes (not functional)         Vents:  Oxygen Concentration (%): 21 (09/14/20 0848)    Lines/Drains/Airways     Peripherally Inserted Central Catheter Line            PICC Double Lumen 09/17/20 1030 left cephalic less than 1 day          Drain                 Nephrostomy 08/13/20 0805 Left 12 Fr. 35 days         Nephrostomy 08/13/20 0809 Right 12 Fr. 35 days         Closed/Suction Drain 09/11/20 1832 Right Abdomen Bulb 15 Fr. 6 days         Urostomy 09/12/20 0800 LLQ 5 days         NG/OG Tube 09/17/20 0930 Right nostril less than 1 day                Significant Labs:    CBC/Anemia Profile:  Recent Labs   Lab 09/16/20  1355 09/17/20  0649 09/17/20  1545 09/18/20  0022   WBC  --  11.95 8.72 6.01   HGB  --  7.8* 8.4* 8.4*   HCT  --  25.8* 29.3* 27.9*   PLT  --  327 301 358*   MCV  --  89 91 88   RDW  --  16.7* 16.7* 16.7*   IRON <10*  --   --   --    FERRITIN 135  --   --   --    RETIC 2.3  --   --   --    FOLATE 2.2*  --   --   --    [id number] 283  --   --   --         Chemistries:  Recent Labs   Lab 09/16/20  0430 09/17/20  0649 09/17/20  1544    143 146*   K 4.0 3.6 3.8    109 112*   CO2 25 24 22*   BUN 20 25* 23*   CREATININE 1.0 1.0 0.9   CALCIUM 8.3* 8.7 9.0   MG 1.6 2.1  --    PHOS 4.3 2.9  --        All pertinent labs within the past 24 hours have been reviewed.    Significant Imaging: I have reviewed all pertinent imaging results/findings within the past 24 hours.    Assessment/Plan:     Active Diagnoses:    Diagnosis Date Noted POA    PRINCIPAL PROBLEM:  Bilateral ureteral obstruction [N13.5] 09/11/2020 Yes      Problems Resolved During this Admission:      s/p open transverse colon conduit urinary diversion on 9/11/2020; c/b perforated abdominal viscous    Neuro:  -sedation/analgesia: Dilaudid PCA     CV:   - HDS off of pressors  - continue to monitor     Pulm:  -  Satting well on room air currently; supplemental O2 PRN     FEN/GI:  perforated abdominal viscous  - s/p R hemicolectomy/end-ileostomy 9/17  - NPO; holding TPN for now  - MIVF @125cc/hr  - NG in place  - Protonix 40mg IV daily  - Daily metabolic panel with PRN repletion of electrolytes     Renal: Bilateral ureteral obstruction 2/2 pelvic radiation therapy; s/p colonic urinary diversion 9/11  - Percutaneous nephrostomy tubes from previous procedure remain; non-functional  - Continue to monitor with daily metabolic labs     HEME/ID: This pt is a Jehova's witness; declines PRBCs, consents to plasma/albumin PRN  - H/H downtrending throughout hospitalization.  Minimize blood draws if possible; recommend pediatric blood tubes  - Daily CBC  - Zosyn  - Holding DVT ppx due to risk of bleed and pt's unwillingness to undergo PRBC transfusion     Endo:  - BG monitorin     Dispo:  - Continue SICU care       Critical care was time spent personally by me on the following activities: development of treatment plan with patient or surrogate and bedside caregivers, discussions with consultants, evaluation of patient's response to treatment, examination of patient, ordering and performing treatments and interventions, ordering and review of laboratory studies, ordering and review of radiographic studies, pulse oximetry, re-evaluation of patient's condition.  This critical care time did not overlap with that of any other provider or involve time for any procedures.     Abdiel Thakkar MD  Critical Care - Surgery  [facility]

## 2020-09-19 LAB
ANION GAP SERPL CALC-SCNC: 9 MMOL/L (ref 8–16)
ANISOCYTOSIS BLD QL SMEAR: SLIGHT
BASOPHILS # BLD AUTO: 0.01 K/UL (ref 0–0.2)
BASOPHILS NFR BLD: 0.1 % (ref 0–1.9)
BUN SERPL-MCNC: 24 MG/DL (ref 6–20)
CALCIUM SERPL-MCNC: 8.2 MG/DL (ref 8.7–10.5)
CHLORIDE SERPL-SCNC: 116 MMOL/L (ref 95–110)
CO2 SERPL-SCNC: 24 MMOL/L (ref 23–29)
CREAT SERPL-MCNC: 1 MG/DL (ref 0.5–1.4)
DIFFERENTIAL METHOD: ABNORMAL
EOSINOPHIL # BLD AUTO: 0 K/UL (ref 0–0.5)
EOSINOPHIL NFR BLD: 0 % (ref 0–8)
ERYTHROCYTE [DISTWIDTH] IN BLOOD BY AUTOMATED COUNT: 17 % (ref 11.5–14.5)
EST. GFR  (AFRICAN AMERICAN): >60 ML/MIN/1.73 M^2
EST. GFR  (NON AFRICAN AMERICAN): >60 ML/MIN/1.73 M^2
GLUCOSE SERPL-MCNC: 148 MG/DL (ref 70–110)
HCT VFR BLD AUTO: 21.7 % (ref 37–48.5)
HGB BLD-MCNC: 6.8 G/DL (ref 12–16)
HYPOCHROMIA BLD QL SMEAR: ABNORMAL
IMM GRANULOCYTES # BLD AUTO: 0.18 K/UL (ref 0–0.04)
IMM GRANULOCYTES NFR BLD AUTO: 1.3 % (ref 0–0.5)
LYMPHOCYTES # BLD AUTO: 0.8 K/UL (ref 1–4.8)
LYMPHOCYTES NFR BLD: 6 % (ref 18–48)
MAGNESIUM SERPL-MCNC: 2.4 MG/DL (ref 1.6–2.6)
MCH RBC QN AUTO: 26.8 PG (ref 27–31)
MCHC RBC AUTO-ENTMCNC: 31.3 G/DL (ref 32–36)
MCV RBC AUTO: 85 FL (ref 82–98)
MONOCYTES # BLD AUTO: 0.8 K/UL (ref 0.3–1)
MONOCYTES NFR BLD: 6.2 % (ref 4–15)
NEUTROPHILS # BLD AUTO: 11.7 K/UL (ref 1.8–7.7)
NEUTROPHILS NFR BLD: 86.4 % (ref 38–73)
NRBC BLD-RTO: 0 /100 WBC
PHOSPHATE SERPL-MCNC: 2.1 MG/DL (ref 2.7–4.5)
PLATELET # BLD AUTO: 319 K/UL (ref 150–350)
PLATELET BLD QL SMEAR: ABNORMAL
PMV BLD AUTO: 9.7 FL (ref 9.2–12.9)
POLYCHROMASIA BLD QL SMEAR: ABNORMAL
POTASSIUM SERPL-SCNC: 3.6 MMOL/L (ref 3.5–5.1)
RBC # BLD AUTO: 2.54 M/UL (ref 4–5.4)
SODIUM SERPL-SCNC: 149 MMOL/L (ref 136–145)
TRIGL SERPL-MCNC: 227 MG/DL (ref 30–150)
WBC # BLD AUTO: 13.52 K/UL (ref 3.9–12.7)

## 2020-09-19 PROCEDURE — 85025 COMPLETE CBC W/AUTO DIFF WBC: CPT

## 2020-09-19 PROCEDURE — 27000221 HC OXYGEN, UP TO 24 HOURS

## 2020-09-19 PROCEDURE — 94770 HC EXHALED C02 TEST: CPT

## 2020-09-19 PROCEDURE — 80048 BASIC METABOLIC PNL TOTAL CA: CPT

## 2020-09-19 PROCEDURE — B4185 PARENTERAL SOL 10 GM LIPIDS: HCPCS | Performed by: STUDENT IN AN ORGANIZED HEALTH CARE EDUCATION/TRAINING PROGRAM

## 2020-09-19 PROCEDURE — 25000003 PHARM REV CODE 250: Performed by: STUDENT IN AN ORGANIZED HEALTH CARE EDUCATION/TRAINING PROGRAM

## 2020-09-19 PROCEDURE — 11000001 HC ACUTE MED/SURG PRIVATE ROOM

## 2020-09-19 PROCEDURE — 84478 ASSAY OF TRIGLYCERIDES: CPT

## 2020-09-19 PROCEDURE — 94799 UNLISTED PULMONARY SVC/PX: CPT

## 2020-09-19 PROCEDURE — 63600175 PHARM REV CODE 636 W HCPCS: Performed by: STUDENT IN AN ORGANIZED HEALTH CARE EDUCATION/TRAINING PROGRAM

## 2020-09-19 PROCEDURE — 84100 ASSAY OF PHOSPHORUS: CPT

## 2020-09-19 PROCEDURE — 94761 N-INVAS EAR/PLS OXIMETRY MLT: CPT

## 2020-09-19 PROCEDURE — 83735 ASSAY OF MAGNESIUM: CPT

## 2020-09-19 PROCEDURE — 36415 COLL VENOUS BLD VENIPUNCTURE: CPT

## 2020-09-19 PROCEDURE — C9113 INJ PANTOPRAZOLE SODIUM, VIA: HCPCS | Performed by: STUDENT IN AN ORGANIZED HEALTH CARE EDUCATION/TRAINING PROGRAM

## 2020-09-19 PROCEDURE — A4216 STERILE WATER/SALINE, 10 ML: HCPCS | Performed by: STUDENT IN AN ORGANIZED HEALTH CARE EDUCATION/TRAINING PROGRAM

## 2020-09-19 PROCEDURE — A4217 STERILE WATER/SALINE, 500 ML: HCPCS | Performed by: STUDENT IN AN ORGANIZED HEALTH CARE EDUCATION/TRAINING PROGRAM

## 2020-09-19 PROCEDURE — 99900035 HC TECH TIME PER 15 MIN (STAT)

## 2020-09-19 RX ORDER — LANOLIN ALCOHOL/MO/W.PET/CERES
1000 CREAM (GRAM) TOPICAL DAILY
Status: DISCONTINUED | OUTPATIENT
Start: 2020-09-19 | End: 2020-09-19

## 2020-09-19 RX ORDER — CYANOCOBALAMIN 1000 UG/ML
100 INJECTION, SOLUTION INTRAMUSCULAR; SUBCUTANEOUS DAILY
Status: COMPLETED | OUTPATIENT
Start: 2020-09-20 | End: 2020-09-26

## 2020-09-19 RX ORDER — CYANOCOBALAMIN 1000 UG/ML
100 INJECTION, SOLUTION INTRAMUSCULAR; SUBCUTANEOUS WEEKLY
Status: DISCONTINUED | OUTPATIENT
Start: 2020-09-19 | End: 2020-09-19

## 2020-09-19 RX ORDER — CYANOCOBALAMIN 1000 UG/ML
100 INJECTION, SOLUTION INTRAMUSCULAR; SUBCUTANEOUS WEEKLY
Status: DISCONTINUED | OUTPATIENT
Start: 2020-09-26 | End: 2020-10-13 | Stop reason: HOSPADM

## 2020-09-19 RX ADMIN — IRON SUCROSE 200 MG: 20 INJECTION, SOLUTION INTRAVENOUS at 09:09

## 2020-09-19 RX ADMIN — Medication 10 ML: at 06:09

## 2020-09-19 RX ADMIN — EPOETIN ALFA-EPBX 20000 UNITS: 10000 INJECTION, SOLUTION INTRAVENOUS; SUBCUTANEOUS at 06:09

## 2020-09-19 RX ADMIN — Medication 10 ML: at 05:09

## 2020-09-19 RX ADMIN — PIPERACILLIN SODIUM AND TAZOBACTAM SODIUM 4.5 G: 4; .5 INJECTION, POWDER, LYOPHILIZED, FOR SOLUTION INTRAVENOUS at 07:09

## 2020-09-19 RX ADMIN — PIPERACILLIN SODIUM AND TAZOBACTAM SODIUM 4.5 G: 4; .5 INJECTION, POWDER, LYOPHILIZED, FOR SOLUTION INTRAVENOUS at 11:09

## 2020-09-19 RX ADMIN — PANTOPRAZOLE SODIUM 40 MG: 40 INJECTION, POWDER, LYOPHILIZED, FOR SOLUTION INTRAVENOUS at 09:09

## 2020-09-19 RX ADMIN — Medication: at 04:09

## 2020-09-19 RX ADMIN — MAGNESIUM SULFATE HEPTAHYDRATE: 500 INJECTION, SOLUTION INTRAMUSCULAR; INTRAVENOUS at 09:09

## 2020-09-19 RX ADMIN — Medication 10 ML: at 11:09

## 2020-09-19 RX ADMIN — I.V. FAT EMULSION 250 ML: 20 EMULSION INTRAVENOUS at 09:09

## 2020-09-19 RX ADMIN — PIPERACILLIN SODIUM AND TAZOBACTAM SODIUM 4.5 G: 4; .5 INJECTION, POWDER, LYOPHILIZED, FOR SOLUTION INTRAVENOUS at 12:09

## 2020-09-19 RX ADMIN — Medication 10 ML: at 12:09

## 2020-09-19 RX ADMIN — Medication: at 05:09

## 2020-09-19 NOTE — PLAN OF CARE
Pt resting in bed comfortably. PICC line intact and infusing, free of infection and irritation. Urostomy and ileostomy draining to ostomy bags. Bilateral neph tubes in place to back and clamped. NGT to LIWS, no s/s of discomfort. Fall precautions maintained, no falls noted. Call light within reach, bed locked and in lowest position. Non-skid socks on while out of bed. Patient instructed to call for assistance. Skin integrity maintained as patient is independent with frequent repositioning. C/o pain, managed with PCA pump. No complaints of N/V. Progressing towards goals. Will continue to monitor and follow plan of care.

## 2020-09-19 NOTE — PLAN OF CARE
Plan of care reviewed with pt. Pt AAOx4, VS as charted. Patient on RA. Purposeful rounding for pt care and safety. No reports of NV, pain managed with PCA pump. No falls or injuries reported during this shift. Skin integrity as charted, drains emptied. Safety precautions maintained during shift- bed in low position, call light in reach, SR up x2, personal belongings in reach.

## 2020-09-19 NOTE — PT/OT/SLP DISCHARGE
Physical Therapy Discharge Summary    Name: Edita Riley  MRN: [id number]   Principal Problem: Bilateral ureteral obstruction     Patient Discharged from acute Physical Therapy on 2020.  Please refer to prior PT noted date on 2020 for functional status.     Assessment:     Pt s/p colectomy 2020.  New orders needed once medically appropriate.    Objective:     GOALS:   Multidisciplinary Problems     Physical Therapy Goals        Problem: Physical Therapy Goal    Goal Priority Disciplines Outcome Goal Variances Interventions   Physical Therapy Goal     PT, PT/OT Ongoing, Progressing     Description: Goals to be met by: 10/3/2020     Patient will increase functional independence with mobility by performin. Supine to sit with Set-up [place]  2. Sit to supine with Set-up [place]  3. Sit to stand transfer with Supervision  4. Bed to chair transfer with Supervision using Rolling Walker  5. Gait  x 120 feet with Stand-by Assistance using Rolling Walker.   6. Ascend/descend 3 stair with left Handrails Stand-by Assistance                   Reasons for Discontinuation of Therapy Services  s/p colectomy       Plan:     Patient Discharged to: acute services POSS room 524.    John Bowen, PT  2020

## 2020-09-19 NOTE — PROGRESS NOTES
[facility]  Urology  Progress Note    Patient Name: Edita Riley  MRN: [id number]  Admission Date: 9/11/2020  Hospital Length of Stay: 8 days  Code Status: Full Code   Attending Provider: Hammad Reynolds MD   Primary Care Physician: Audrey Mustafa MD    Subjective:     HPI:  Edita Riley is a 55 y.o. female with a history of cervical cancer s/p pelvic radiation. She has since developed bilateral ureteral strictures and bilateral hydronephrosis R>L. She had a MAG3 scan confirmed presence of bilateral obstruction. She is s/p open transverse colon conduit urinary diversion on 9/11/2020.     Interval History: NAEON. Good output from colon conduit (misrecorded in computer as ileostomy output; ileostomy has yet to put out.) Patient feeling much better. NG in place, output not recorded. AFVSS.     Review of Systems  Objective:     Temp:  [96.6 °F (35.9 °C)-99.2 °F (37.3 °C)] 98.3 °F (36.8 °C)  Pulse:  [] 98  Resp:  [14-19] 18  SpO2:  [92 %-99 %] 99 %  BP: (121-172)/(61-97) 154/86     Body mass index is 28.94 kg/m².    Date 09/19/20 0700 - 09/20/20 0659   Shift 6610-0653 5830-8939 3198-8868 24 Hour Total   INTAKE   Shift Total(mL/kg)       OUTPUT   Urine(mL/kg/hr) 75   75   Shift Total(mL/kg) 75(0.8)   75(0.8)   Weight (kg) 88.9 88.9 88.9 88.9          Drains     Drain                 Nephrostomy 08/13/20 0805 Left 12 Fr. 37 days         Nephrostomy 08/13/20 0809 Right 12 Fr. 37 days         NG/OG Tube 09/17/20 0930 Right nostril 2 days         Colostomy 09/18/20 0025 RUQ 1 day         Ileostomy 09/18/20 0025 LUQ 1 day         Nephrostomy 09/18/20 0025 Left 1 day         Nephrostomy 09/18/20 0025 Right 1 day                Physical Exam   Constitutional: No distress.   HENT:   Head: Normocephalic and atraumatic.   Eyes: Conjunctivae are normal. No scleral icterus.   Neck: Normal range of motion.   Cardiovascular: Normal rate.    Pulmonary/Chest: Effort normal. No respiratory  distress.   Abdominal: Soft. She exhibits no distension. There is abdominal tenderness (appropriate). There is no rebound and no guarding.   Urostomy p/p/p draining clear yellow urine  Ureteral stents in place  Ileostomy on RLQ with bowel sweat present    Musculoskeletal: Normal range of motion.      Comments: SCDs in place   Neurological: She is alert.   Skin: Skin is warm and dry. She is not diaphoretic.         Significant Labs:    BMP:  Recent Labs   Lab 09/18/20  0022 09/18/20  0449 09/19/20  0408    145 149*   K 3.7 3.9 3.6   * 112* 116*   CO2 18* 18* 24   BUN 23* 21* 24*   CREATININE 0.9 0.9 1.0   CALCIUM 7.9* 8.2* 8.2*       CBC:   Recent Labs   Lab 09/18/20  0022 09/18/20 0449 09/19/20  0408   WBC 6.01 10.85 13.52*   HGB 8.4* 8.7* 6.8*   HCT 27.9* 29.1* 21.7*   * 374* 319       All pertinent labs results from the past 24 hours have been reviewed.    Significant Imaging:  All pertinent imaging results/findings from the past 24 hours have been reviewed.                  Assessment/Plan:     * Bilateral ureteral obstruction  S/p urinary diversion with colon conduit 9/11   S/p emergent ex-lap 9/17, colo-colic anastomosis intact, bowel perforation found, underwent resection of right colon, remaining transverses colon, and proximal descending colon, Charlotte's pouch created   - admitted to SICU; transferred to CRS primary  - dilaudid PCA   - CBC, BMP, Mg, Phos daily  - PT/OT on board, OOBTC as tolerated, encourage ambulation   - Wound care ostomy consulted for assistance with ostomy teaching   - patient is a Taoism; Hgb decreased to 6.8, getting iron infusion; management per primary and Heme-Onc  - Drains: maintain ORESTES (Cr 0.8), ureteral stents, and red rubber  - Prophylaxis: IS, SCDs, GI ppx  - diet and NG management per CRS; OK'ed for sips today            VTE Risk Mitigation (From admission, onward)         Ordered     IP VTE HIGH RISK PATIENT  Once      09/11/20 2024     Place  sequential compression device  Until discontinued      09/11/20 2024                Burton Gallardo MD  Urology  [facility]

## 2020-09-19 NOTE — SUBJECTIVE & OBJECTIVE
Interval History: NAEON. Good output from colon conduit (misrecorded in computer as ileostomy output; ileostomy has yet to put out.) Patient feeling much better. NG in place, output not recorded. AFVSS.     Review of Systems  Objective:     Temp:  [96.6 °F (35.9 °C)-99.2 °F (37.3 °C)] 98.3 °F (36.8 °C)  Pulse:  [] 98  Resp:  [14-19] 18  SpO2:  [92 %-99 %] 99 %  BP: (121-172)/(61-97) 154/86     Body mass index is 28.94 kg/m².    Date 09/19/20 0700 - 09/20/20 0659   Shift 3134-7270 8181-8721 6489-4260 24 Hour Total   INTAKE   Shift Total(mL/kg)       OUTPUT   Urine(mL/kg/hr) 75   75   Shift Total(mL/kg) 75(0.8)   75(0.8)   Weight (kg) 88.9 88.9 88.9 88.9          Drains     Drain                 Nephrostomy 08/13/20 0805 Left 12 Fr. 37 days         Nephrostomy 08/13/20 0809 Right 12 Fr. 37 days         NG/OG Tube 09/17/20 0930 Right nostril 2 days         Colostomy 09/18/20 0025 RUQ 1 day         Ileostomy 09/18/20 0025 LUQ 1 day         Nephrostomy 09/18/20 0025 Left 1 day         Nephrostomy 09/18/20 0025 Right 1 day                Physical Exam   Constitutional: No distress.   HENT:   Head: Normocephalic and atraumatic.   Eyes: Conjunctivae are normal. No scleral icterus.   Neck: Normal range of motion.   Cardiovascular: Normal rate.    Pulmonary/Chest: Effort normal. No respiratory distress.   Abdominal: Soft. She exhibits no distension. There is abdominal tenderness (appropriate). There is no rebound and no guarding.   Urostomy p/p/p draining clear yellow urine  Ureteral stents in place  Ileostomy on RLQ with bowel sweat present    Musculoskeletal: Normal range of motion.      Comments: SCDs in place   Neurological: She is alert.   Skin: Skin is warm and dry. She is not diaphoretic.         Significant Labs:    BMP:  Recent Labs   Lab 09/18/20  0022 09/18/20  0449 09/19/20  0408    145 149*   K 3.7 3.9 3.6   * 112* 116*   CO2 18* 18* 24   BUN 23* 21* 24*   CREATININE 0.9 0.9 1.0   CALCIUM 7.9*  8.2* 8.2*       CBC:   Recent Labs   Lab 09/18/20  0022 09/18/20  0449 09/19/20  0408   WBC 6.01 10.85 13.52*   HGB 8.4* 8.7* 6.8*   HCT 27.9* 29.1* 21.7*   * 374* 319       All pertinent labs results from the past 24 hours have been reviewed.    Significant Imaging:  All pertinent imaging results/findings from the past 24 hours have been reviewed.

## 2020-09-19 NOTE — CARE UPDATE
Rapid Response Respiratory Therapy ETCO2 Check     Time of visit: 8  Code Status: Full Code   : 1965  Bed: 524/524 A:   MRN: [id number]    SITUATION     Evaluated patient for: ETCO2 Compliance     BACKGROUND     Patient has a past medical history of Abnormal mammogram, Anxiety, Cervical cancer, Chronic back pain, Depression, Diarrhea due to malabsorption, Fibromyalgia, Generalized abdominal pain, GERD (gastroesophageal reflux disease), Hiatal hernia, History of cervical cancer, History of cervical cancer, psychiatric care, Hypertension, Hypomagnesemia, Lactose intolerance, Metastatic squamous cell carcinoma to lymph node, Nephrostomy tube displaced, 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 drug, Osteoarthritis of back, Psychiatric problem, and Stroke.    ASSESSMENT     Is the ETCO2 monitor on? (Yes/No)  Yes   Is the patient wearing a cannula? (Yes/No) Yes  Are ETCO2 orders placed? (Yes/No) Yes  Is the patient on a PCA pump? (Yes/No) Yes  ETCO2 monitored: ETCO2 (mmHg): 41 mmHg  O2 Device/Concentration: 2LNC  Pulse: 86 Respiratory rate: 16 Temperature: Temp: 97.2 °F (36.2 °C) BP: BP: 131/78 SpO2: 98  Level of Consciousness: Level of Consciousness (AVPU): alert  All Lung Field Breath Sounds: All Lung Fields Breath Sounds: Anterior:, Lateral:, diminished  MARYANN Breath Sounds: diminished  LLL Breath Sounds: diminished  RUL Breath Sounds: diminished  RML Breath Sounds: diminished  RLL Breath Sounds: diminished  Ambu at bedside: Ambu bag with the patient?: Yes, Adult Ambu    INTERVENTIONS/RECOMMENDATIONS     Patient complains of no respiratory issues at this time. Weaning of pain pump and oxygen as tolerated.    PHYSICIAN ESCALATION     Physician Escalation (Yes/No): No     Care discussed with primary [place] SUN RRT     FOLLOW-UP     Please call back the Rapid Response RTGolden, RRT at x [phone] for any questions or concerns.

## 2020-09-19 NOTE — PT/OT/SLP DISCHARGE
Occupational Therapy Discharge Summary    Edita Hardin [facility]  MRN: [id number]   Principal Problem: Bilateral ureteral obstruction      Patient Discharged from acute Occupational Therapy on 9/19/20.  Please refer to prior OT note dated 9/16/20 for functional status.    Assessment:      Pt is s/p colectomy 9/17/20. Will need new OT orders when medically stable.    Objective:     GOALS:   Multidisciplinary Problems     Occupational Therapy Goals        Problem: Occupational Therapy Goal    Goal Priority Disciplines Outcome Interventions   Occupational Therapy Goal     OT, PT/OT Ongoing, Progressing    Description: Goals to be met by: 9/26/2020    Patient will increase functional independence with ADLs by performing:    LE Dressing with Supervision.  Grooming while standing with Supervision.  Toileting from toilet with Supervision for hygiene and clothing management.   Supine to sit with Supervision.  Toilet transfer to toilet with Supervision.                     Reasons for Discontinuation of Therapy Services  surgery 9/17      Plan:     Patient Discharged to: hospitals 524    Stephanie Sales OT  9/19/2020

## 2020-09-19 NOTE — PLAN OF CARE
Brief Hematology Follow-up    54yo Carey pro with history of cervical cancer who presented with bilateral ureteral obstruction. She is s/p open transverse colon conduit urinary diversion on 9/11/2020 then taken emergently to OR 9/18/20, colo-colic anastomosis intact, bowel perforation found, underwent resection of right colon, remaining transverses colon, and proximal descending colon.    Post-operative anemia in Carey witness patient  - Pt's hemoglobin has been in 10-11 range; her Hgb is now 6.8  - Ferritin is noted to be 135 and TIBC low, so although there may have some blood loss post-op, labs are currently not indicative of iron deficiency anemia. Patient has been on venofer 200mg x5 doses per surgery team starting 9/18/20.  - EPO level from 9/17 pending  - Recommend minimal blood draws, please use pediatric tubes when possible  - hemolytic labs negative  - Folate of 2.2, continue 1 mg of folic acid daily  - B12 at 283, borderline level, recommend starting daily cyanocobalamin 1000mcg daily (have ordered)  - Start EPO 20,000U subQ Q48 hours (have ordered) to target Hb>10 for post-operative guidelines in [place] witness patient.  -Please ensure on appropriate VTE prophylaxis given VTE risk while on EPO, start prophylactic anti-coagulation as soon as can from post-surgical standpoint    Discussed with Dr. Momin. Please contact Hematology Consult with any additional questions.    Nicky Madsen MD PGY-VI  Hematology/Oncology Fellow

## 2020-09-19 NOTE — ASSESSMENT & PLAN NOTE
S/p urinary diversion with colon conduit 9/11   S/p emergent ex-lap 9/17, colo-colic anastomosis intact, bowel perforation found, underwent resection of right colon, remaining transverses colon, and proximal descending colon, Charlotte's pouch created   - admitted to SICU; transferred to CRS primary  - dilaudid PCA   - CBC, BMP, Mg, Phos daily  - PT/OT on board, OOBTC as tolerated, encourage ambulation   - Wound care ostomy consulted for assistance with ostomy teaching   - patient is a Yazidism; Hgb decreased to 6.8, getting iron infusion; management per primary and Heme-Onc  - Drains: maintain ORESTES (Cr 0.8), ureteral stents, and red rubber  - Prophylaxis: IS, SCDs, GI ppx  - diet and NG management per CRS; OK'ed for sips today

## 2020-09-20 PROBLEM — E44.0 MODERATE MALNUTRITION: Status: ACTIVE | Noted: 2020-09-20

## 2020-09-20 LAB
ANION GAP SERPL CALC-SCNC: 9 MMOL/L (ref 8–16)
ANISOCYTOSIS BLD QL SMEAR: SLIGHT
ANISOCYTOSIS BLD QL SMEAR: SLIGHT
BASOPHILS # BLD AUTO: 0.03 K/UL (ref 0–0.2)
BASOPHILS # BLD AUTO: 0.03 K/UL (ref 0–0.2)
BASOPHILS NFR BLD: 0.1 % (ref 0–1.9)
BASOPHILS NFR BLD: 0.1 % (ref 0–1.9)
BUN SERPL-MCNC: 25 MG/DL (ref 6–20)
CALCIUM SERPL-MCNC: 8.5 MG/DL (ref 8.7–10.5)
CHLORIDE SERPL-SCNC: 115 MMOL/L (ref 95–110)
CO2 SERPL-SCNC: 28 MMOL/L (ref 23–29)
CREAT SERPL-MCNC: 0.8 MG/DL (ref 0.5–1.4)
DIFFERENTIAL METHOD: ABNORMAL
DIFFERENTIAL METHOD: ABNORMAL
EOSINOPHIL # BLD AUTO: 0 K/UL (ref 0–0.5)
EOSINOPHIL # BLD AUTO: 0 K/UL (ref 0–0.5)
EOSINOPHIL NFR BLD: 0.1 % (ref 0–8)
EOSINOPHIL NFR BLD: 0.1 % (ref 0–8)
ERYTHROCYTE [DISTWIDTH] IN BLOOD BY AUTOMATED COUNT: 17.4 % (ref 11.5–14.5)
ERYTHROCYTE [DISTWIDTH] IN BLOOD BY AUTOMATED COUNT: 17.4 % (ref 11.5–14.5)
EST. GFR  (AFRICAN AMERICAN): >60 ML/MIN/1.73 M^2
EST. GFR  (NON AFRICAN AMERICAN): >60 ML/MIN/1.73 M^2
GLUCOSE SERPL-MCNC: 136 MG/DL (ref 70–110)
HCT VFR BLD AUTO: 22.6 % (ref 37–48.5)
HCT VFR BLD AUTO: 22.6 % (ref 37–48.5)
HGB BLD-MCNC: 6.8 G/DL (ref 12–16)
HGB BLD-MCNC: 6.8 G/DL (ref 12–16)
HYPOCHROMIA BLD QL SMEAR: ABNORMAL
HYPOCHROMIA BLD QL SMEAR: ABNORMAL
IMM GRANULOCYTES # BLD AUTO: 0.85 K/UL (ref 0–0.04)
IMM GRANULOCYTES # BLD AUTO: 0.85 K/UL (ref 0–0.04)
IMM GRANULOCYTES NFR BLD AUTO: 3.7 % (ref 0–0.5)
IMM GRANULOCYTES NFR BLD AUTO: 3.7 % (ref 0–0.5)
LYMPHOCYTES # BLD AUTO: 1.6 K/UL (ref 1–4.8)
LYMPHOCYTES # BLD AUTO: 1.6 K/UL (ref 1–4.8)
LYMPHOCYTES NFR BLD: 6.7 % (ref 18–48)
LYMPHOCYTES NFR BLD: 6.7 % (ref 18–48)
MAGNESIUM SERPL-MCNC: 2.4 MG/DL (ref 1.6–2.6)
MCH RBC QN AUTO: 26.9 PG (ref 27–31)
MCH RBC QN AUTO: 26.9 PG (ref 27–31)
MCHC RBC AUTO-ENTMCNC: 30.1 G/DL (ref 32–36)
MCHC RBC AUTO-ENTMCNC: 30.1 G/DL (ref 32–36)
MCV RBC AUTO: 89 FL (ref 82–98)
MCV RBC AUTO: 89 FL (ref 82–98)
MONOCYTES # BLD AUTO: 1.4 K/UL (ref 0.3–1)
MONOCYTES # BLD AUTO: 1.4 K/UL (ref 0.3–1)
MONOCYTES NFR BLD: 5.8 % (ref 4–15)
MONOCYTES NFR BLD: 5.8 % (ref 4–15)
NEUTROPHILS # BLD AUTO: 19.4 K/UL (ref 1.8–7.7)
NEUTROPHILS # BLD AUTO: 19.4 K/UL (ref 1.8–7.7)
NEUTROPHILS NFR BLD: 83.6 % (ref 38–73)
NEUTROPHILS NFR BLD: 83.6 % (ref 38–73)
NRBC BLD-RTO: 0 /100 WBC
NRBC BLD-RTO: 0 /100 WBC
PHOSPHATE SERPL-MCNC: 2.4 MG/DL (ref 2.7–4.5)
PLATELET # BLD AUTO: 382 K/UL (ref 150–350)
PLATELET # BLD AUTO: 382 K/UL (ref 150–350)
PLATELET BLD QL SMEAR: ABNORMAL
PLATELET BLD QL SMEAR: ABNORMAL
PMV BLD AUTO: 10 FL (ref 9.2–12.9)
PMV BLD AUTO: 10 FL (ref 9.2–12.9)
POTASSIUM SERPL-SCNC: 3.3 MMOL/L (ref 3.5–5.1)
RBC # BLD AUTO: 2.53 M/UL (ref 4–5.4)
RBC # BLD AUTO: 2.53 M/UL (ref 4–5.4)
SODIUM SERPL-SCNC: 152 MMOL/L (ref 136–145)
WBC # BLD AUTO: 23.27 K/UL (ref 3.9–12.7)
WBC # BLD AUTO: 23.27 K/UL (ref 3.9–12.7)

## 2020-09-20 PROCEDURE — 25000003 PHARM REV CODE 250: Performed by: STUDENT IN AN ORGANIZED HEALTH CARE EDUCATION/TRAINING PROGRAM

## 2020-09-20 PROCEDURE — 84100 ASSAY OF PHOSPHORUS: CPT

## 2020-09-20 PROCEDURE — A4217 STERILE WATER/SALINE, 500 ML: HCPCS | Performed by: STUDENT IN AN ORGANIZED HEALTH CARE EDUCATION/TRAINING PROGRAM

## 2020-09-20 PROCEDURE — 11000001 HC ACUTE MED/SURG PRIVATE ROOM

## 2020-09-20 PROCEDURE — 85025 COMPLETE CBC W/AUTO DIFF WBC: CPT

## 2020-09-20 PROCEDURE — 94761 N-INVAS EAR/PLS OXIMETRY MLT: CPT

## 2020-09-20 PROCEDURE — 63600175 PHARM REV CODE 636 W HCPCS: Performed by: COLON & RECTAL SURGERY

## 2020-09-20 PROCEDURE — 63600175 PHARM REV CODE 636 W HCPCS: Performed by: STUDENT IN AN ORGANIZED HEALTH CARE EDUCATION/TRAINING PROGRAM

## 2020-09-20 PROCEDURE — A4216 STERILE WATER/SALINE, 10 ML: HCPCS | Performed by: STUDENT IN AN ORGANIZED HEALTH CARE EDUCATION/TRAINING PROGRAM

## 2020-09-20 PROCEDURE — B4185 PARENTERAL SOL 10 GM LIPIDS: HCPCS | Performed by: STUDENT IN AN ORGANIZED HEALTH CARE EDUCATION/TRAINING PROGRAM

## 2020-09-20 PROCEDURE — 97802 MEDICAL NUTRITION INDIV IN: CPT

## 2020-09-20 PROCEDURE — 80048 BASIC METABOLIC PNL TOTAL CA: CPT

## 2020-09-20 PROCEDURE — 27000221 HC OXYGEN, UP TO 24 HOURS

## 2020-09-20 PROCEDURE — 83735 ASSAY OF MAGNESIUM: CPT

## 2020-09-20 PROCEDURE — C9113 INJ PANTOPRAZOLE SODIUM, VIA: HCPCS | Performed by: STUDENT IN AN ORGANIZED HEALTH CARE EDUCATION/TRAINING PROGRAM

## 2020-09-20 PROCEDURE — 94799 UNLISTED PULMONARY SVC/PX: CPT

## 2020-09-20 RX ORDER — POTASSIUM CHLORIDE 7.45 MG/ML
60 INJECTION INTRAVENOUS ONCE
Status: COMPLETED | OUTPATIENT
Start: 2020-09-20 | End: 2020-09-20

## 2020-09-20 RX ORDER — POTASSIUM CHLORIDE 7.45 MG/ML
10 INJECTION INTRAVENOUS
Status: COMPLETED | OUTPATIENT
Start: 2020-09-20 | End: 2020-09-20

## 2020-09-20 RX ORDER — ENOXAPARIN SODIUM 100 MG/ML
40 INJECTION SUBCUTANEOUS EVERY 24 HOURS
Status: DISCONTINUED | OUTPATIENT
Start: 2020-09-20 | End: 2020-09-22

## 2020-09-20 RX ADMIN — I.V. FAT EMULSION 250 ML: 20 EMULSION INTRAVENOUS at 10:09

## 2020-09-20 RX ADMIN — Medication 10 ML: at 06:09

## 2020-09-20 RX ADMIN — PIPERACILLIN SODIUM AND TAZOBACTAM SODIUM 4.5 G: 4; .5 INJECTION, POWDER, LYOPHILIZED, FOR SOLUTION INTRAVENOUS at 11:09

## 2020-09-20 RX ADMIN — POTASSIUM CHLORIDE 10 MEQ: 7.46 INJECTION, SOLUTION INTRAVENOUS at 09:09

## 2020-09-20 RX ADMIN — CYANOCOBALAMIN 100 MCG: 1000 INJECTION, SOLUTION INTRAMUSCULAR at 09:09

## 2020-09-20 RX ADMIN — PANTOPRAZOLE SODIUM 40 MG: 40 INJECTION, POWDER, LYOPHILIZED, FOR SOLUTION INTRAVENOUS at 09:09

## 2020-09-20 RX ADMIN — Medication 10 ML: at 12:09

## 2020-09-20 RX ADMIN — POTASSIUM CHLORIDE 10 MEQ: 7.46 INJECTION, SOLUTION INTRAVENOUS at 02:09

## 2020-09-20 RX ADMIN — MAGNESIUM SULFATE HEPTAHYDRATE: 500 INJECTION, SOLUTION INTRAMUSCULAR; INTRAVENOUS at 10:09

## 2020-09-20 RX ADMIN — Medication 10 ML: at 11:09

## 2020-09-20 RX ADMIN — Medication: at 09:09

## 2020-09-20 RX ADMIN — POTASSIUM CHLORIDE 10 MEQ: 7.46 INJECTION, SOLUTION INTRAVENOUS at 12:09

## 2020-09-20 RX ADMIN — ENOXAPARIN SODIUM 40 MG: 40 INJECTION SUBCUTANEOUS at 11:09

## 2020-09-20 RX ADMIN — POTASSIUM CHLORIDE 10 MEQ: 7.46 INJECTION, SOLUTION INTRAVENOUS at 10:09

## 2020-09-20 RX ADMIN — PIPERACILLIN SODIUM AND TAZOBACTAM SODIUM 4.5 G: 4; .5 INJECTION, POWDER, LYOPHILIZED, FOR SOLUTION INTRAVENOUS at 03:09

## 2020-09-20 RX ADMIN — POTASSIUM CHLORIDE 10 MEQ: 7.46 INJECTION, SOLUTION INTRAVENOUS at 04:09

## 2020-09-20 RX ADMIN — IRON SUCROSE 200 MG: 20 INJECTION, SOLUTION INTRAVENOUS at 09:09

## 2020-09-20 RX ADMIN — Medication: at 01:09

## 2020-09-20 RX ADMIN — Medication: at 03:09

## 2020-09-20 RX ADMIN — POTASSIUM CHLORIDE 10 MEQ: 7.46 INJECTION, SOLUTION INTRAVENOUS at 06:09

## 2020-09-20 RX ADMIN — PIPERACILLIN SODIUM AND TAZOBACTAM SODIUM 4.5 G: 4; .5 INJECTION, POWDER, LYOPHILIZED, FOR SOLUTION INTRAVENOUS at 08:09

## 2020-09-20 NOTE — ASSESSMENT & PLAN NOTE
S/p urinary diversion with colon conduit 9/11   S/p emergent ex-lap 9/17, colo-colic anastomosis intact, bowel perforation found, underwent resection of right colon, remaining transverses colon, and proximal descending colon, Charlotte's pouch created       - CRS primary  - would think that clear liquids would be OK today since she is having some output from ileostomy and she has no N/V or abd pain with NGT out  - CBC, BMP, Mg, Phos daily  - TPN per primary service, triglycerides are elevated  - Leukocytosis could represent new infection or abscess, however she is afebrile with normal vital signs  - PT/OT on board, OOBTC as tolerated, encourage ambulation   - Wound care ostomy consulted for assistance with ostomy teaching   - patient is a [employer]; Hgb decreased to 6.8, getting iron infusion; management per primary and Heme-Onc (on EPO, B12, and iron)  - Drains: maintain ureteral stents, and red rubber  - Prophylaxis: IS, SCDs, GI ppx  - Would consider restarting subQ heparin given increased thrombosis risk with EPO

## 2020-09-20 NOTE — SUBJECTIVE & OBJECTIVE
Subjective:     Interval History: Patient seen and examined at bedside. NAEON. Patient resting comfortably in bed. AFVSS. POD 3. Pain under control. No respiratory distress.  Gas output from ileostomy. No stool output. NGT fell out overnight and was replaced. Patient now on Iron and EPO for anemia.      Post-Op Info:  Procedure(s):  COLECTOMY, RIGHT Extended  CREATION, ILEOSTOMY   3 Days Post-Op      Medications:  Continuous Infusions:   hydromorphone in 0.9 % NaCl 6 mg/30 ml      Standard Custom Day One ADULT TPN for patient with NO electrolyte abnormality or NO renal dysfunction (PERIPHERAL) 42 mL/hr at 09/19/20 2157     Scheduled Meds:   cyanocobalamin  100 mcg Intramuscular Daily    [START ON 9/26/2020] cyanocobalamin  100 mcg Intramuscular Weekly    enoxaparin  40 mg Subcutaneous Q24H    epoetin yecenia-epbx  20,000 Units Subcutaneous Q48H    iron sucrose (VENOFER) IVPB  200 mg Intravenous Daily    pantoprazole  40 mg Intravenous Daily    piperacillin-tazobactam (ZOSYN) IVPB  4.5 g Intravenous Q8H    potassium chloride in water  60 mEq Intravenous Once    sodium chloride 0.9%  10 mL Intravenous Q6H     PRN Meds:   iohexol    naloxone    sodium chloride 0.9%    sodium chloride 0.9%    sodium chloride 0.9%        Objective:     Vital Signs (Most Recent):  Temp: 96 °F (35.6 °C) (09/20/20 1122)  Pulse: 91 (09/20/20 0759)  Resp: 16 (09/20/20 0922)  BP: 138/83 (09/20/20 0759)  SpO2: 98 % (09/20/20 0759) Vital Signs (24h Range):  Temp:  [96 °F (35.6 °C)-98.1 °F (36.7 °C)] 96 °F (35.6 °C)  Pulse:  [81-96] 91  Resp:  [5-16] 16  SpO2:  [95 %-98 %] 98 %  BP: (128-157)/(60-89) 138/83     Intake/Output - Last 3 Shifts       09/18 0700 - 09/19 0659 09/19 0700 - 09/20 0659 09/20 0700 - 09/21 0659    P.O. 0 0 0    I.V. (mL/kg)       NG/GT       IV Piggyback 100 200     TPN  679     Total Intake(mL/kg) 100 (1.1) 879 (9.9) 0 (0)    Urine (mL/kg/hr)  675 (0.3) 400 (1)    Emesis/NG output       Drains  250     Stool  1575 0     Blood       Total Output 1575 925 400    Net -1475 -46 -400                 Physical Exam  Vitals signs reviewed.   Constitutional:       General: She is not in acute distress.  HENT:      Head: Normocephalic and atraumatic.   Eyes:      Conjunctiva/sclera: Conjunctivae normal.   Neck:      Musculoskeletal: Normal range of motion.   Cardiovascular:      Rate and Rhythm: Normal rate.      Pulses: Normal pulses.   Pulmonary:      Effort: Pulmonary effort is normal. No respiratory distress.   Abdominal:      General: There is no distension.      Palpations: Abdomen is soft.      Tenderness: There is no guarding or rebound.      Comments: Urostomy draining clear yellow urine  Ileostomy on RLQ with gas output   Musculoskeletal: Normal range of motion.   Skin:     General: Skin is warm and dry.   Neurological:      Mental Status: She is alert.       Significant Labs:  CBC (Last 3 Results):   Recent Labs   Lab 09/18/20 0449 09/19/20 0408 09/20/20  0201   WBC 10.85 13.52* 23.27*  23.27*   RBC 3.28* 2.54* 2.53*  2.53*   HGB 8.7* 6.8* 6.8*  6.8*   HCT 29.1* 21.7* 22.6*  22.6*   * 319 382*  382*   MCV 89 85 89  89   MCH 26.5* 26.8* 26.9*  26.9*   MCHC 29.9* 31.3* 30.1*  30.1*     CBC:   Recent Labs   Lab 09/20/20  0201   WBC 23.27*  23.27*   RBC 2.53*  2.53*   HGB 6.8*  6.8*   HCT 22.6*  22.6*   *  382*   MCV 89  89   MCH 26.9*  26.9*   MCHC 30.1*  30.1*     CMP (Last 3 Results):   Recent Labs   Lab 09/18/20  0022 09/18/20 0449 09/19/20  0408 09/20/20  0201   * 158* 148* 136*   CALCIUM 7.9* 8.2* 8.2* 8.5*   ALBUMIN 1.7*  --   --   --    PROT 5.2*  --   --   --     145 149* 152*   K 3.7 3.9 3.6 3.3*   CO2 18* 18* 24 28   * 112* 116* 115*   BUN 23* 21* 24* 25*   CREATININE 0.9 0.9 1.0 0.8   ALKPHOS 80  --   --   --    ALT 10  --   --   --    AST 18  --   --   --    BILITOT 0.5  --   --   --      CMP:   Recent Labs   Lab 09/18/20  0022  09/20/20  0201   *   < >  136*   CALCIUM 7.9*   < > 8.5*   ALBUMIN 1.7*  --   --    PROT 5.2*  --   --       < > 152*   K 3.7   < > 3.3*   CO2 18*   < > 28   *   < > 115*   BUN 23*   < > 25*   CREATININE 0.9   < > 0.8   ALKPHOS 80  --   --    ALT 10  --   --    AST 18  --   --    BILITOT 0.5  --   --     < > = values in this interval not displayed.     CRP (Last 3 Results): No results for input(s): CRP in the last 168 hours.  CRP: No results for input(s): CRP in the last 168 hours.  All pertinent lab results within the last 24 hours have been reviewed.     Significant Diagnostics:  I have reviewed all pertinent imaging results/findings within the past 24 hours.

## 2020-09-20 NOTE — ASSESSMENT & PLAN NOTE
Nutrition Problem:  Moderate Protein-Calorie Malnutrition  Malnutrition in the context of Chronic Illness/Injury    Related to (etiology):  Inadequate Energy Intake; Decreased appetite with N/V    Signs and Symptoms (as evidenced by):  Energy Intake: <75% of estimated energy requirement for > 3 months  Body Fat Depletion: well-nourished   Muscle Mass Depletion: mild depletion of temples and clavicle region   Weight Loss: 17% x 6 months      Interventions(treatment strategy):  Collaboration of nutrition care with other providers  Parenteral Nutrition     Nutrition Diagnosis Status:  New

## 2020-09-20 NOTE — PROGRESS NOTES
[facility]  Colorectal Surgery  Progress Note    Patient Name: Edita Riley  MRN: [id number]  Admission Date: 9/11/2020  Hospital Length of Stay: 9 days  Attending Physician: Hammad Reynolds MD    Subjective:     Interval History: Patient seen and examined at bedside. NAEON. Patient resting comfortably in bed. AFVSS. POD 3. Pain under control. No respiratory distress.  Gas output from ileostomy. No stool output. NGT fell out overnight and was replaced. Patient now on Iron and EPO for anemia.      Post-Op Info:  Procedure(s):  COLECTOMY, RIGHT Extended  CREATION, ILEOSTOMY   3 Days Post-Op      Medications:  Continuous Infusions:   hydromorphone in 0.9 % NaCl 6 mg/30 ml      Standard Custom Day One ADULT TPN for patient with NO electrolyte abnormality or NO renal dysfunction (PERIPHERAL) 42 mL/hr at 09/19/20 2157     Scheduled Meds:   cyanocobalamin  100 mcg Intramuscular Daily    [START ON 9/26/2020] cyanocobalamin  100 mcg Intramuscular Weekly    enoxaparin  40 mg Subcutaneous Q24H    epoetin yecenia-epbx  20,000 Units Subcutaneous Q48H    iron sucrose (VENOFER) IVPB  200 mg Intravenous Daily    pantoprazole  40 mg Intravenous Daily    piperacillin-tazobactam (ZOSYN) IVPB  4.5 g Intravenous Q8H    potassium chloride in water  60 mEq Intravenous Once    sodium chloride 0.9%  10 mL Intravenous Q6H     PRN Meds:   iohexol    naloxone    sodium chloride 0.9%    sodium chloride 0.9%    sodium chloride 0.9%        Objective:     Vital Signs (Most Recent):  Temp: 96 °F (35.6 °C) (09/20/20 1122)  Pulse: 91 (09/20/20 0759)  Resp: 16 (09/20/20 0922)  BP: 138/83 (09/20/20 0759)  SpO2: 98 % (09/20/20 0759) Vital Signs (24h Range):  Temp:  [96 °F (35.6 °C)-98.1 °F (36.7 °C)] 96 °F (35.6 °C)  Pulse:  [81-96] 91  Resp:  [5-16] 16  SpO2:  [95 %-98 %] 98 %  BP: (128-157)/(60-89) 138/83     Intake/Output - Last 3 Shifts       09/18 0700 - 09/19 0659 09/19 0700 - 09/20 0659 09/20 0700 - 09/21  0659    P.O. 0 0 0    I.V. (mL/kg)       NG/GT       IV Piggyback 100 200     TPN  679     Total Intake(mL/kg) 100 (1.1) 879 (9.9) 0 (0)    Urine (mL/kg/hr)  675 (0.3) 400 (1)    Emesis/NG output       Drains  250     Stool 1575 0     Blood       Total Output 1575 925 400    Net -2450 -95 -771                 Physical Exam  Vitals signs reviewed.   Constitutional:       General: She is not in acute distress.  HENT:      Head: Normocephalic and atraumatic.   Eyes:      Conjunctiva/sclera: Conjunctivae normal.   Neck:      Musculoskeletal: Normal range of motion.   Cardiovascular:      Rate and Rhythm: Normal rate.      Pulses: Normal pulses.   Pulmonary:      Effort: Pulmonary effort is normal. No respiratory distress.   Abdominal:      General: There is no distension.      Palpations: Abdomen is soft.      Tenderness: There is no guarding or rebound.      Comments: Urostomy draining clear yellow urine  Ileostomy on RLQ with gas output   Musculoskeletal: Normal range of motion.   Skin:     General: Skin is warm and dry.   Neurological:      Mental Status: She is alert.       Significant Labs:  CBC (Last 3 Results):   Recent Labs   Lab 09/18/20  0449 09/19/20  0408 09/20/20  0201   WBC 10.85 13.52* 23.27*  23.27*   RBC 3.28* 2.54* 2.53*  2.53*   HGB 8.7* 6.8* 6.8*  6.8*   HCT 29.1* 21.7* 22.6*  22.6*   * 319 382*  382*   MCV 89 85 89  89   MCH 26.5* 26.8* 26.9*  26.9*   MCHC 29.9* 31.3* 30.1*  30.1*     CBC:   Recent Labs   Lab 09/20/20  0201   WBC 23.27*  23.27*   RBC 2.53*  2.53*   HGB 6.8*  6.8*   HCT 22.6*  22.6*   *  382*   MCV 89  89   MCH 26.9*  26.9*   MCHC 30.1*  30.1*     CMP (Last 3 Results):   Recent Labs   Lab 09/18/20  0022 09/18/20  0449 09/19/20  0408 09/20/20  0201   * 158* 148* 136*   CALCIUM 7.9* 8.2* 8.2* 8.5*   ALBUMIN 1.7*  --   --   --    PROT 5.2*  --   --   --     145 149* 152*   K 3.7 3.9 3.6 3.3*   CO2 18* 18* 24 28   * 112* 116* 115*   BUN  23* 21* 24* 25*   CREATININE 0.9 0.9 1.0 0.8   ALKPHOS 80  --   --   --    ALT 10  --   --   --    AST 18  --   --   --    BILITOT 0.5  --   --   --      CMP:   Recent Labs   Lab 09/18/20  0022  09/20/20  0201   *   < > 136*   CALCIUM 7.9*   < > 8.5*   ALBUMIN 1.7*  --   --    PROT 5.2*  --   --       < > 152*   K 3.7   < > 3.3*   CO2 18*   < > 28   *   < > 115*   BUN 23*   < > 25*   CREATININE 0.9   < > 0.8   ALKPHOS 80  --   --    ALT 10  --   --    AST 18  --   --    BILITOT 0.5  --   --     < > = values in this interval not displayed.     CRP (Last 3 Results): No results for input(s): CRP in the last 168 hours.  CRP: No results for input(s): CRP in the last 168 hours.  All pertinent lab results within the last 24 hours have been reviewed.     Significant Diagnostics:  I have reviewed all pertinent imaging results/findings within the past 24 hours.    Assessment/Plan:     * Bilateral ureteral obstruction  Ms. Riley is a 55 y.o. female with b/l ureteral obstruction s/p transverse colon conduit on 9/11/2020. Extended R colectomy and ileostomy creation on 9/17.     - Diet NPO, sips ok  - Continue TPN  - D/c daily cbc  - On Zosyn  - Remove NGT  - OOB, activity as tolerated  - PT/OT            Karina Beverly MD  Colorectal Surgery  [facility]

## 2020-09-20 NOTE — ASSESSMENT & PLAN NOTE
Ms. Riley is a 55 y.o. female with b/l ureteral obstruction s/p transverse colon conduit on 9/11/2020. Extended R colectomy and ileostomy creation on 9/17.     - Diet NPO, sips ok  - Continue TPN  - D/c daily cbc  - On Zosyn  - Remove NGT  - OOB, activity as tolerated  - PT/OT

## 2020-09-20 NOTE — OP NOTE
DATE OF PROCEDURE:  9/17/2020     PREOPERATIVE DIAGNOSES:  Pneumoperitoneum, suspected colocolonic anastomotic leak     POSTOPERATIVE DIAGNOSES:  Perforation of the colon distal to the colocolonic anastomosis with fecal peritonitis     PROCEDURES PERFORMED:  Exploratory laparotomy with washout, extended right hemicolectomy, creation of an end ileostomy     SURGEON:  Hammad Reynolds M.D.    CO-SURGEON:  Leslie Balbuena M.D.     ASSISTANT:  Veda Mclean M.D. [RES], Julius Villegas M.D. [RES]     ANESTHESIA:  General endotracheal     IV FLUIDS:  1000 mL     ESTIMATED BLOOD LOSS:  100  mL     URINE OUTPUT:  500 mL     DRAINS:  Nasogastric tube     SPECIMENS:  1.  Abdominal fluid for cultures and sensitivity  2.  Right colon     COMPLICATIONS:  None.     OPERATIVE FINDINGS:    1.  Fecal peritonitis  2.  Intact colocolonic anastomosis at the level of the midtransverse colon  3.  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  At time of that procedure, I procured a vascularized segment of transverse colon to be used as the urinary conduit, as requested by Urology.  A primary end-to-end, hand-sewn colocolonic anastomosis was performed at that time to restore intestinal continuity once the segment of colon to be used for the conduit had been excluded.  The patient did well for the 1st few days postoperatively but then began to develop abdominal pain and distention consistent with an ileus.  This morning, there was small amount of pneumoperitoneum seen on a plain abdominal film, and later today she began to drain feculent appearing fluid through her Kurtis-Arias drain.  She had a normal white blood cell count and did not have peritonitis on examination, so a CT scan was performed, which demonstrated a large volume of  free abdominal fluid as well as a large volume of pneumoperitoneum, concerning for an anastomotic leak.  She is being brought back to the operating room for urgent exploratory laparotomy.       DESCRIPTION OF PROCEDURE:  The patient was identified, brought to the Operating Room, placed on the table in a supine position after obtaining informed consent.  Venous sequential stockings were placed.  She had been given therapeutic intravenous antibiotics prior to being brought to the operating room, so additional prophylactic antibiotics were not required.  After induction of general endotracheal anesthesia, the abdomen was prepped and draped in the usual sterile fashion, using a sterile plastic drape to exclude the left-sided urostomy.    The midline skin staples were removed to re-open her laparotomy incision.  Subcutaneous tissues were  bluntly, and the fascial sutures were cut and removed to gain access to the peritoneal cavity.  Immediately upon entering the abdominal cavity there was a large amount of feculent contamination identified.  The abdomen was irrigated and suctioned.  Ultimately we were able to identify our colocolonic anastomosis at the level of the midtransverse colon which actually appeared to be intact.  Approximately 8 cm distal to the anastomosis we identified a perforation of the colon that measured approximately 1-cm in diameter.  There was a large amount of inflammatory rind covering the majority of the small bowel and the mesentery.  The colon itself was thickened and inflamed, and the mesocolon was very inflamed and foreshortened.  The segment of transverse colon used as the urinary conduit was identified and noted to be intact and viable.    We began by mobilizing the colon away from the inflammatory adhesions to adjacent structures.  It was obvious that a repair of the perforation was not going to be possible and that she would require resection.  The distal transverse colon to the  level of the splenic flexure appeared inflamed.  I could not tell if it was ischemic based on gross appearance due to the amount of fecal soiling of the tissues.  I elected to fully mobilize the splenic flexure and the descending colon.  I chose a point on the descending colon that appeared healthy and well vascularized as our distal point of division.  Was divided with a CARMEN stapler.  Mesentery of the distal transverse colon, splenic flexure, and descending colon was then divided with the LigaSure device.  We then turned our attention towards mobilizing the more proximal colon.  The remaining lateral attachments of the ascending colon were incised, and the right colon and hepatic flexure were fully mobilized.  I had initially hoped to divide the colon proximal to the anastomosis and perform an end transverse colostomy, however the mesentery was quite foreshortened would not reach the abdominal wall without tension for the purpose of creating a colostomy.  Additionally, her right colon was fairly short in length.  Rather than perform an end colostomy using short right colon, I elected to remove the remaining proximal colon and perform an end ileostomy.  The terminal ileum was divided with a CARMEN stapler.  We then divided the remaining mesentery of the ascending colon, hepatic flexure, and proximal transverse colon.  The resected specimen was then passed from the field.    We then performed a thorough inspection of the abdominal cavity to be sure that there were no other missed injuries.  Dr. Balbuena was on hand to be sure that the urinary conduit appeared to be intact and was not compromised in any way.  I then ran the small bowel from the ligament of Treitz to the divided distal end.  There was a large amount of fibrinous inflammatory rind on the peritoneal surface of the small bowel but there were no obvious enterotomies noted.  The stomach appeared grossly normal as well.  We then copiously irrigated the abdomen  with several L of warm saline, taking care to thoroughly irrigate the subphrenic space on either side as well as the pelvis.  Once we had fully irrigated the abdomen and ensured adequate hemostasis, we began preparation for creating an end ileostomy on the patient's right side.  A trephination was created in the right upper quadrant using a muscle-splitting technique.  The divided distal end of the terminal ileum was able to be delivered through the trephination using a Trimble clamp, maintaining proper mesenteric orientation.    We then closed the midline fascia with a running looped #1 PDS suture.  Subcutaneous tissue were irrigated with sterile saline and noted to be hemostatic.  Skin edges were re-approximated with widely spaced staples.  The distal end of the exteriorized limb of small bowel was then excised with electrocautery, and the ileostomy was matured placing 3 0 chromic Nicole sutures around its perimeter.  An ostomy appliance was cut to the appropriate size and placed over the stoma.  Midline dressings were applied to the incision.      The patient tolerated the procedure well with no complications.  She was extubated in the Operating Room and taken to Recovery in critical condition.  All needle, instrument and sponge counts were correct at the end of the case.  I was present throughout the entire procedure.    Hammad Reynolds MD, FACS, FASCRS  Senior Staff Surgeon  Department of Colon & Rectal Surgery     This note was created using voice recognition software, and may contain some unrecognized transcriptional errors.

## 2020-09-20 NOTE — PLAN OF CARE
Pt resting in bed comfortably. PICC line intact and infusing, free of infection and irritation. Urostomy and ileostomy draining to ostomy bags. Bilateral neph tubes clamped. Fall precautions maintained, no falls noted. Call light within reach, bed locked and in lowest position. Non-skid socks on while out of bed. Patient instructed to call for assistance. Skin integrity maintained as patient is independent with frequent repositioning. C/o pain, managed with PCA pump, no other complaints or concerns. NG tube removed this morning, no complaints of N/V. Progressing towards goals. Will continue to monitor and follow plan of care.

## 2020-09-20 NOTE — SUBJECTIVE & OBJECTIVE
Interval History:   NGT fell out overnight, patient had no issues with pain or N/V  Small amt of bowel sweat out of ileostomy  Great UOP  Hgb stable, now on Iron and EPO  WBC elevated this morning 13 -> 22    Review of Systems  Objective:     Temp:  [96.1 °F (35.6 °C)-98.1 °F (36.7 °C)] 96.9 °F (36.1 °C)  Pulse:  [81-96] 91  Resp:  [5-16] 16  SpO2:  [95 %-98 %] 98 %  BP: (128-157)/(60-89) 138/83     Body mass index is 28.94 kg/m².    Date 09/20/20 0700 - 09/21/20 0659   Shift 1999-0385 9819-8810 9735-5804 24 Hour Total   INTAKE   P.O. 0   0   Shift Total(mL/kg) 0(0)   0(0)   OUTPUT   Urine(mL/kg/hr) 250   250   Shift Total(mL/kg) 250(2.8)   250(2.8)   Weight (kg) 88.9 88.9 88.9 88.9          Drains     Drain                 Nephrostomy 08/13/20 0805 Left 12 Fr. 38 days         Nephrostomy 08/13/20 0809 Right 12 Fr. 38 days         Colostomy 09/18/20 0025 RUQ 2 days         Ileostomy 09/18/20 0025 LUQ 2 days         Nephrostomy 09/18/20 0025 Left 2 days         Nephrostomy 09/18/20 0025 Right 2 days                Physical Exam   Constitutional: No distress.   HENT:   Head: Normocephalic and atraumatic.   Eyes: Conjunctivae are normal. No scleral icterus.   Neck: Normal range of motion.   Cardiovascular: Normal rate.    Pulmonary/Chest: Effort normal. No respiratory distress.   Abdominal: Soft. She exhibits no distension. There is abdominal tenderness (appropriate). There is no rebound and no guarding.   Urostomy p/p/p draining clear yellow urine  Ureteral stents in place  Ileostomy on RLQ with bowel sweat present    Musculoskeletal: Normal range of motion.      Comments: SCDs in place   Neurological: She is alert.   Skin: Skin is warm and dry. She is not diaphoretic.         Significant Labs:    BMP:  Recent Labs   Lab 09/18/20  0449 09/19/20  0408 09/20/20  0201    149* 152*   K 3.9 3.6 3.3*   * 116* 115*   CO2 18* 24 28   BUN 21* 24* 25*   CREATININE 0.9 1.0 0.8   CALCIUM 8.2* 8.2* 8.5*       CBC:    Recent Labs   Lab 09/18/20  0449 09/19/20  0408 09/20/20  0201   WBC 10.85 13.52* 23.27*  23.27*   HGB 8.7* 6.8* 6.8*  6.8*   HCT 29.1* 21.7* 22.6*  22.6*   * 319 382*  382*       All pertinent labs results from the past 24 hours have been reviewed.    Significant Imaging:  All pertinent imaging results/findings from the past 24 hours have been reviewed.

## 2020-09-20 NOTE — PROGRESS NOTES
[facility]  Urology  Progress Note    Patient Name: Edita Riley  MRN: [id number]  Admission Date: 9/11/2020  Hospital Length of Stay: 9 days  Code Status: Full Code   Attending Provider: Hammad Reynolds MD  Primary Care Physician: Audrey Mustafa MD    Subjective:     HPI:  Edita Riley is a 55 y.o. female with a history of cervical cancer s/p pelvic radiation. She has since developed bilateral ureteral strictures and bilateral hydronephrosis R>L. She had a MAG3 scan confirmed presence of bilateral obstruction. She is s/p open transverse colon conduit urinary diversion on 9/11/2020.     Interval History:   NGT fell out overnight, patient had no issues with pain or N/V  Small amt of bowel sweat out of ileostomy  Great UOP  Hgb stable, now on Iron and EPO  WBC elevated this morning 13 -> 22    Review of Systems  Objective:     Temp:  [96.1 °F (35.6 °C)-98.1 °F (36.7 °C)] 96.9 °F (36.1 °C)  Pulse:  [81-96] 91  Resp:  [5-16] 16  SpO2:  [95 %-98 %] 98 %  BP: (128-157)/(60-89) 138/83     Body mass index is 28.94 kg/m².    Date 09/20/20 0700 - 09/21/20 0659   Shift 9514-9022 8241-6504 8733-2333 24 Hour Total   INTAKE   P.O. 0   0   Shift Total(mL/kg) 0(0)   0(0)   OUTPUT   Urine(mL/kg/hr) 250   250   Shift Total(mL/kg) 250(2.8)   250(2.8)   Weight (kg) 88.9 88.9 88.9 88.9          Drains     Drain                 Nephrostomy 08/13/20 0805 Left 12 Fr. 38 days         Nephrostomy 08/13/20 0809 Right 12 Fr. 38 days         Colostomy 09/18/20 0025 RUQ 2 days         Ileostomy 09/18/20 0025 LUQ 2 days         Nephrostomy 09/18/20 0025 Left 2 days         Nephrostomy 09/18/20 0025 Right 2 days                Physical Exam   Constitutional: No distress.   HENT:   Head: Normocephalic and atraumatic.   Eyes: Conjunctivae are normal. No scleral icterus.   Neck: Normal range of motion.   Cardiovascular: Normal rate.    Pulmonary/Chest: Effort normal. No respiratory distress.   Abdominal: Soft.  She exhibits no distension. There is abdominal tenderness (appropriate). There is no rebound and no guarding.   Urostomy p/p/p draining clear yellow urine  Ureteral stents in place  Ileostomy on RLQ with bowel sweat present    Musculoskeletal: Normal range of motion.      Comments: SCDs in place   Neurological: She is alert.   Skin: Skin is warm and dry. She is not diaphoretic.         Significant Labs:    BMP:  Recent Labs   Lab 09/18/20 0449 09/19/20  0408 09/20/20  0201    149* 152*   K 3.9 3.6 3.3*   * 116* 115*   CO2 18* 24 28   BUN 21* 24* 25*   CREATININE 0.9 1.0 0.8   CALCIUM 8.2* 8.2* 8.5*       CBC:   Recent Labs   Lab 09/18/20 0449 09/19/20  0408 09/20/20  0201   WBC 10.85 13.52* 23.27*  23.27*   HGB 8.7* 6.8* 6.8*  6.8*   HCT 29.1* 21.7* 22.6*  22.6*   * 319 382*  382*       All pertinent labs results from the past 24 hours have been reviewed.    Significant Imaging:  All pertinent imaging results/findings from the past 24 hours have been reviewed.                  Assessment/Plan:     * Bilateral ureteral obstruction  S/p urinary diversion with colon conduit 9/11   S/p emergent ex-lap 9/17, colo-colic anastomosis intact, bowel perforation found, underwent resection of right colon, remaining transverses colon, and proximal descending colon, Charlotte's pouch created       - CRS primary  - would think that clear liquids would be OK today since she is having some output from ileostomy and she has no N/V or abd pain with NGT out  - CBC, BMP, Mg, Phos daily  - TPN per primary service, triglycerides are elevated  - Leukocytosis could represent new infection or abscess, however she is afebrile with normal vital signs  - PT/OT on board, OOBTC as tolerated, encourage ambulation   - Wound care ostomy consulted for assistance with ostomy teaching   - patient is a [employer]; Hgb decreased to 6.8, getting iron infusion; management per primary and Heme-Onc (on EPO, B12, and iron)  -  Drains: maintain ureteral stents, and red rubber  - Prophylaxis: IS, SCDs, GI ppx  - Would consider restarting subQ heparin given increased thrombosis risk with EPO            VTE Risk Mitigation (From admission, onward)         Ordered     IP VTE HIGH RISK PATIENT  Once      09/11/20 2024     Place sequential compression device  Until discontinued      09/11/20 2024                Julius Zamarripa MD  Urology  [facility]

## 2020-09-20 NOTE — PLAN OF CARE
Problem: Malnutrition  Goal: Improved Nutritional Intake  Outcome: Ongoing, Progressing           Recommendations    Pt meets chronic moderate malnutrition criteria.     1. As medically able, ADAT to regular with texture per SLP.     2. Recommend Custom TPN 95 gm AA / 275 gm Dex + IV lipids to provide 1815 kcal, 95 gm protein, and GIR of 2.15.     3. RD following.     Goals: meet >75% EEN/EPN by RD follow-up  Nutrition Goal Status: new

## 2020-09-20 NOTE — PROGRESS NOTES
"[facility]  Adult Nutrition  Progress Note    SUMMARY       Recommendations    Pt meets chronic moderate malnutrition criteria.     1. As medically able, ADAT to regular with texture per SLP.     2. Recommend Custom TPN 95 gm AA / 275 gm Dex + IV lipids to provide 1815 kcal, 95 gm protein, and GIR of 2.15.     3. RD following.     Goals: meet >75% EEN/EPN by RD follow-up  Nutrition Goal Status: new  Communication of RD Recs: (POC)    Reason for Assessment    Reason For Assessment: NPO/clear liquids x 5 days, new TPN  Diagnosis: (Bilateral ureteral obstruction )  Relevant Medical History: cervical cancer s/p pelvic radiation, Fibromyalgia, HTN  Interdisciplinary Rounds: did not attend  General Information Comments: S/p urinary diversion with colon conduit 9/11. S/p emergent ex-lap, colo-colic anastomosis intact, bowel perforation found, underwent resection of right colon, remaining transverses colon, and proximal descending colon, Charlotte's pouch created 9/17. NG tube, diet possibly advancing to clear liquids today. Pt resting in bed with no family members at bedside. Pt reports decreased appetite for a couple months with N/V PTA. UW 238lbs, noted significant wt loss. NFPE completed. Pt with mild muscle/fat wasting. Pt meets chronic moderate malnutrition with current information.   Nutrition Discharge Planning: unable to determine at this time    Nutrition Risk Screen    Nutrition Risk Screen: no indicators present    Nutrition/Diet History    Spiritual, Cultural Beliefs, Adventism Practices, Values that Affect Care: no  Factors Affecting Nutritional Intake: altered gastrointestinal function, NPO    Anthropometrics    Temp: 96 °F (35.6 °C)  Height Method: Measured  Height: 5' 9" (175.3 cm)  Height (inches): 69 in  Weight Method: Bed Scale  Weight: 88.9 kg (196 lb)  Weight (lb): 196 lb  Ideal Body Weight (IBW), Female: 145 lb  % Ideal Body Weight, Female (lb): 135.17 %  BMI (Calculated): 28.9  BMI " Grade: 25 - 29.9 - overweight  Weight Loss: unintentional  Usual Body Weight (UBW), k.5 kg(per chart review 3/22/20)  % Usual Body Weight: 82.88  % Weight Change From Usual Weight: -17.3 %     Lab/Procedures/Meds    Pertinent Labs Reviewed: reviewed  Pertinent Labs Comments: Na 152, K 3.3, BUN 25, glucose 136, phos 2.4  Pertinent Medications Reviewed: reviewed  Pertinent Medications Comments: PCA    Estimated/Assessed Needs    Weight Used For Calorie Calculations: 88.9 kg (195 lb 15.8 oz)  Energy Calorie Requirements (kcal): 1922 kcal/day  Energy Need Method: [place]-St Jeor(x 1.25)  Protein Requirements:  gm/day(1.0-1.2 gm/kg)  Weight Used For Protein Calculations: 88.9 kg (195 lb 15.8 oz)  Fluid Requirements (mL): 1 mL/kcal or per MD     RDA Method (mL): 1922     Nutrition Prescription Ordered    Current Diet Order: NPO  Current Nutrition Support Formula Ordered: (Custom TPN 28 gm AA / 101 gm Dex + IV lipids)  Current Nutrition Support Rate Ordered: 42 (ml)  Current Nutrition Support Frequency Ordered: mL/hr    Evaluation of Received Nutrient/Fluid Intake    Parenteral Calories (kcal): 454  Parenteral Protein (gm): 28  Parenteral Fluid (mL): 1008  Lipid Calories (kcals): 500 kcals  GIR (Glucose Infusion Rate) (mg/kg/min): 0.79 mg/kg/min  Total Calories (kcal): 954  % Kcal Needs: 50  % Protein Needs: 29  I/O: -3.712L since admit  Energy Calories Required: not meeting needs  Protein Required: not meeting needs  Fluid Required: (per MD)  Comments: LBM (ostomy)  Tolerance: tolerating  % Intake of Estimated Energy Needs: 25 - 50 %  % Meal Intake: NPO    Nutrition Risk    Level of Risk/Frequency of Follow-up: (f/u 2 x wk)     Assessment and Plan    Moderate malnutrition  Nutrition Problem:  Moderate Protein-Calorie Malnutrition  Malnutrition in the context of Chronic Illness/Injury    Related to (etiology):  Inadequate Energy Intake; Decreased appetite with N/V    Signs and Symptoms (as evidenced  by):  Energy Intake: <75% of estimated energy requirement for > 3 months  Body Fat Depletion: well-nourished   Muscle Mass Depletion: mild depletion of temples and clavicle region   Weight Loss: 17% x 6 months      Interventions(treatment strategy):  Collaboration of nutrition care with other providers  Parenteral Nutrition     Nutrition Diagnosis Status:  New             Monitor and Evaluation    Food and Nutrient Intake: energy intake, parenteral nutrition intake  Food and Nutrient Adminstration: enteral and parenteral nutrition administration  Anthropometric Measurements: weight, weight change, body mass index  Biochemical Data, Medical Tests and Procedures: electrolyte and renal panel, gastrointestinal profile, glucose/endocrine profile, inflammatory profile, lipid profile  Nutrition-Focused Physical Findings: overall appearance     Malnutrition Assessment  Malnutrition Type: chronic illness          Weight Loss (Malnutrition): (17% x 1 month)  Energy Intake (Malnutrition): less than 75% for greater than or equal to 3 months   Upper Arm Region (Subcutaneous Fat Loss): well nourished   [place] Region (Muscle Loss): mild depletion  Clavicle Bone Region (Muscle Loss): mild depletion  Clavicle and Acromion Bone Region (Muscle Loss): well nourished  Anterior Thigh Region (Muscle Loss): well nourished                 Nutrition Follow-Up    RD Follow-up?: Yes

## 2020-09-21 ENCOUNTER — TELEPHONE (OUTPATIENT)
Dept: GYNECOLOGIC ONCOLOGY | Facility: CLINIC | Age: 55
End: 2020-09-21

## 2020-09-21 LAB
ANION GAP SERPL CALC-SCNC: 10 MMOL/L (ref 8–16)
ANISOCYTOSIS BLD QL SMEAR: SLIGHT
BACTERIA SPEC AEROBE CULT: ABNORMAL
BASOPHILS # BLD AUTO: 0.02 K/UL (ref 0–0.2)
BASOPHILS NFR BLD: 0.1 % (ref 0–1.9)
BUN SERPL-MCNC: 21 MG/DL (ref 6–20)
CALCIUM SERPL-MCNC: 8.3 MG/DL (ref 8.7–10.5)
CHLORIDE SERPL-SCNC: 111 MMOL/L (ref 95–110)
CO2 SERPL-SCNC: 26 MMOL/L (ref 23–29)
CREAT SERPL-MCNC: 0.8 MG/DL (ref 0.5–1.4)
DIFFERENTIAL METHOD: ABNORMAL
EOSINOPHIL # BLD AUTO: 0.2 K/UL (ref 0–0.5)
EOSINOPHIL NFR BLD: 0.9 % (ref 0–8)
EPO SERPL-ACNC: 68.2 MIU/ML (ref 2.6–18.5)
ERYTHROCYTE [DISTWIDTH] IN BLOOD BY AUTOMATED COUNT: 17.3 % (ref 11.5–14.5)
EST. GFR  (AFRICAN AMERICAN): >60 ML/MIN/1.73 M^2
EST. GFR  (NON AFRICAN AMERICAN): >60 ML/MIN/1.73 M^2
FINAL PATHOLOGIC DIAGNOSIS: NORMAL
GLUCOSE SERPL-MCNC: 142 MG/DL (ref 70–110)
GROSS: NORMAL
HCT VFR BLD AUTO: 22.4 % (ref 37–48.5)
HGB BLD-MCNC: 6.4 G/DL (ref 12–16)
HYPOCHROMIA BLD QL SMEAR: ABNORMAL
IMM GRANULOCYTES # BLD AUTO: 0.7 K/UL (ref 0–0.04)
IMM GRANULOCYTES NFR BLD AUTO: 3.9 % (ref 0–0.5)
LYMPHOCYTES # BLD AUTO: 1.8 K/UL (ref 1–4.8)
LYMPHOCYTES NFR BLD: 10.2 % (ref 18–48)
MAGNESIUM SERPL-MCNC: 1.8 MG/DL (ref 1.6–2.6)
MCH RBC QN AUTO: 26.1 PG (ref 27–31)
MCHC RBC AUTO-ENTMCNC: 28.6 G/DL (ref 32–36)
MCV RBC AUTO: 91 FL (ref 82–98)
MONOCYTES # BLD AUTO: 1.1 K/UL (ref 0.3–1)
MONOCYTES NFR BLD: 6.4 % (ref 4–15)
NEUTROPHILS # BLD AUTO: 13.9 K/UL (ref 1.8–7.7)
NEUTROPHILS NFR BLD: 78.5 % (ref 38–73)
NRBC BLD-RTO: 5 /100 WBC
OVALOCYTES BLD QL SMEAR: ABNORMAL
PHOSPHATE SERPL-MCNC: 3 MG/DL (ref 2.7–4.5)
PLATELET # BLD AUTO: 421 K/UL (ref 150–350)
PMV BLD AUTO: 9.9 FL (ref 9.2–12.9)
POIKILOCYTOSIS BLD QL SMEAR: SLIGHT
POLYCHROMASIA BLD QL SMEAR: ABNORMAL
POTASSIUM SERPL-SCNC: 3.4 MMOL/L (ref 3.5–5.1)
RBC # BLD AUTO: 2.45 M/UL (ref 4–5.4)
SODIUM SERPL-SCNC: 147 MMOL/L (ref 136–145)
WBC # BLD AUTO: 17.78 K/UL (ref 3.9–12.7)

## 2020-09-21 PROCEDURE — 25000003 PHARM REV CODE 250: Performed by: STUDENT IN AN ORGANIZED HEALTH CARE EDUCATION/TRAINING PROGRAM

## 2020-09-21 PROCEDURE — A4217 STERILE WATER/SALINE, 500 ML: HCPCS | Performed by: NURSE PRACTITIONER

## 2020-09-21 PROCEDURE — 11000001 HC ACUTE MED/SURG PRIVATE ROOM

## 2020-09-21 PROCEDURE — 27000221 HC OXYGEN, UP TO 24 HOURS

## 2020-09-21 PROCEDURE — 97168 OT RE-EVAL EST PLAN CARE: CPT

## 2020-09-21 PROCEDURE — 97530 THERAPEUTIC ACTIVITIES: CPT

## 2020-09-21 PROCEDURE — 83735 ASSAY OF MAGNESIUM: CPT

## 2020-09-21 PROCEDURE — 63600175 PHARM REV CODE 636 W HCPCS: Performed by: STUDENT IN AN ORGANIZED HEALTH CARE EDUCATION/TRAINING PROGRAM

## 2020-09-21 PROCEDURE — 63600175 PHARM REV CODE 636 W HCPCS: Performed by: NURSE PRACTITIONER

## 2020-09-21 PROCEDURE — 97116 GAIT TRAINING THERAPY: CPT

## 2020-09-21 PROCEDURE — B4185 PARENTERAL SOL 10 GM LIPIDS: HCPCS | Performed by: NURSE PRACTITIONER

## 2020-09-21 PROCEDURE — 84100 ASSAY OF PHOSPHORUS: CPT

## 2020-09-21 PROCEDURE — 97164 PT RE-EVAL EST PLAN CARE: CPT

## 2020-09-21 PROCEDURE — 94761 N-INVAS EAR/PLS OXIMETRY MLT: CPT

## 2020-09-21 PROCEDURE — 99900035 HC TECH TIME PER 15 MIN (STAT)

## 2020-09-21 PROCEDURE — 85025 COMPLETE CBC W/AUTO DIFF WBC: CPT

## 2020-09-21 PROCEDURE — 25000003 PHARM REV CODE 250: Performed by: NURSE PRACTITIONER

## 2020-09-21 PROCEDURE — A4216 STERILE WATER/SALINE, 10 ML: HCPCS | Performed by: STUDENT IN AN ORGANIZED HEALTH CARE EDUCATION/TRAINING PROGRAM

## 2020-09-21 PROCEDURE — 94770 HC EXHALED C02 TEST: CPT

## 2020-09-21 PROCEDURE — 80048 BASIC METABOLIC PNL TOTAL CA: CPT

## 2020-09-21 PROCEDURE — C9113 INJ PANTOPRAZOLE SODIUM, VIA: HCPCS | Performed by: STUDENT IN AN ORGANIZED HEALTH CARE EDUCATION/TRAINING PROGRAM

## 2020-09-21 RX ADMIN — Medication 10 ML: at 06:09

## 2020-09-21 RX ADMIN — PIPERACILLIN SODIUM AND TAZOBACTAM SODIUM 4.5 G: 4; .5 INJECTION, POWDER, LYOPHILIZED, FOR SOLUTION INTRAVENOUS at 10:09

## 2020-09-21 RX ADMIN — I.V. FAT EMULSION 250 ML: 20 EMULSION INTRAVENOUS at 10:09

## 2020-09-21 RX ADMIN — ENOXAPARIN SODIUM 40 MG: 40 INJECTION SUBCUTANEOUS at 05:09

## 2020-09-21 RX ADMIN — Medication 10 ML: at 12:09

## 2020-09-21 RX ADMIN — Medication: at 01:09

## 2020-09-21 RX ADMIN — PIPERACILLIN SODIUM AND TAZOBACTAM SODIUM 4.5 G: 4; .5 INJECTION, POWDER, LYOPHILIZED, FOR SOLUTION INTRAVENOUS at 11:09

## 2020-09-21 RX ADMIN — CYANOCOBALAMIN 100 MCG: 1000 INJECTION, SOLUTION INTRAMUSCULAR at 08:09

## 2020-09-21 RX ADMIN — PIPERACILLIN SODIUM AND TAZOBACTAM SODIUM 4.5 G: 4; .5 INJECTION, POWDER, LYOPHILIZED, FOR SOLUTION INTRAVENOUS at 04:09

## 2020-09-21 RX ADMIN — MAGNESIUM SULFATE HEPTAHYDRATE: 500 INJECTION, SOLUTION INTRAMUSCULAR; INTRAVENOUS at 10:09

## 2020-09-21 RX ADMIN — EPOETIN ALFA-EPBX 20000 UNITS: 10000 INJECTION, SOLUTION INTRAVENOUS; SUBCUTANEOUS at 04:09

## 2020-09-21 RX ADMIN — PANTOPRAZOLE SODIUM 40 MG: 40 INJECTION, POWDER, LYOPHILIZED, FOR SOLUTION INTRAVENOUS at 08:09

## 2020-09-21 RX ADMIN — IRON SUCROSE 200 MG: 20 INJECTION, SOLUTION INTRAVENOUS at 09:09

## 2020-09-21 NOTE — PLAN OF CARE
09/21/20 1509   Post-Acute Status   Post-Acute Authorization Placement   Post-Acute Placement Status Referrals Sent     EDWARDO faxed referral to Joanie Dewey and Joanie Ramirez via  for review, per eligible insurance coverage. EDWARDO will follow up as needed.    EDWARDO completed LOCET and faxed PASRR to state. Waiting on 142. 9/22 9:22 AM  142. Received.     Kira Dawkins LMSW  Case Management   [facility]   Ext. [phone]

## 2020-09-21 NOTE — SUBJECTIVE & OBJECTIVE
Subjective:     Interval History: no acute events overnite, no n/v, adequate pain control, pos osotmy output    Post-Op Info:  Procedure(s):  COLECTOMY, RIGHT Extended  CREATION, ILEOSTOMY   4 Days Post-Op      Medications:  Continuous Infusions:   hydromorphone in 0.9 % NaCl 6 mg/30 ml      TPN ADULT CENTRAL LINE CUSTOM 60 mL/hr at 09/20/20 2252     Scheduled Meds:   cyanocobalamin  100 mcg Intramuscular Daily    [START ON 9/26/2020] cyanocobalamin  100 mcg Intramuscular Weekly    enoxaparin  40 mg Subcutaneous Q24H    epoetin yecenia-epbx  20,000 Units Subcutaneous Q48H    iron sucrose (VENOFER) IVPB  200 mg Intravenous Daily    pantoprazole  40 mg Intravenous Daily    piperacillin-tazobactam (ZOSYN) IVPB  4.5 g Intravenous Q8H    sodium chloride 0.9%  10 mL Intravenous Q6H     PRN Meds:   iohexol    naloxone    sodium chloride 0.9%    sodium chloride 0.9%    sodium chloride 0.9%        Objective:     Vital Signs (Most Recent):  Temp: 98.8 °F (37.1 °C) (09/21/20 1146)  Pulse: (!) 122 (09/21/20 1146)  Resp: 16 (09/21/20 1146)  BP: 127/71 (09/21/20 1146)  SpO2: 97 % (09/21/20 1146) Vital Signs (24h Range):  Temp:  [97.5 °F (36.4 °C)-98.8 °F (37.1 °C)] 98.8 °F (37.1 °C)  Pulse:  [] 122  Resp:  [13-23] 16  SpO2:  [96 %-99 %] 97 %  BP: (127-149)/(71-91) 127/71     Intake/Output - Last 3 Shifts       09/19 0700 - 09/20 0659 09/20 0700 - 09/21 0659 09/21 0700 - 09/22 0659    P.O. 0 0     IV Piggyback 200            Total Intake(mL/kg) 879 (9.9) 0 (0)     Urine (mL/kg/hr) 675 (0.3) 1200 (0.6)     Drains 250      Stool 0 0     Total Output 925 1200     Net -46 -1200            Stool Occurrence  0 x           Physical Exam  Constitutional:       Appearance: She is well-developed. She is not ill-appearing.   HENT:      Head: Normocephalic.   Eyes:      Pupils: Pupils are equal, round, and reactive to light.   Cardiovascular:      Rate and Rhythm: Normal rate and regular rhythm.      Heart sounds:  Normal heart sounds.   Pulmonary:      Effort: Pulmonary effort is normal. No respiratory distress.      Breath sounds: Normal breath sounds. No wheezing or rales.   Abdominal:      Palpations: Abdomen is soft. There is no mass.      Tenderness: There is no guarding or rebound.      Comments: abd inc line healing wlel  Ileostomy functional  urosotmy functional   Skin:     General: Skin is warm and dry.   Neurological:      Mental Status: She is alert and oriented to person, place, and time.   Psychiatric:         Behavior: Behavior normal.         Thought Content: Thought content normal.         Judgment: Judgment normal.         Significant Labs:  BMP (Last 3 Results):   Recent Labs   Lab 09/19/20  0408 09/20/20  0201 09/21/20  0422   * 136* 142*   * 152* 147*   K 3.6 3.3* 3.4*   * 115* 111*   CO2 24 28 26   BUN 24* 25* 21*   CREATININE 1.0 0.8 0.8   CALCIUM 8.2* 8.5* 8.3*   MG 2.4 2.4 1.8     CBC (Last 3 Results):   Recent Labs   Lab 09/19/20  0408 09/20/20  0201 09/21/20  0422   WBC 13.52* 23.27*  23.27* 17.78*   RBC 2.54* 2.53*  2.53* 2.45*   HGB 6.8* 6.8*  6.8* 6.4*   HCT 21.7* 22.6*  22.6* 22.4*    382*  382* 421*   MCV 85 89  89 91   MCH 26.8* 26.9*  26.9* 26.1*   MCHC 31.3* 30.1*  30.1* 28.6*       Significant Diagnostics:  None

## 2020-09-21 NOTE — PLAN OF CARE
09/21/20 1052   Discharge Reassessment   Assessment Type Discharge Planning Reassessment   Provided patient/caregiver education on the expected discharge date and the discharge plan Yes   Do you have any problems affording any of your prescribed medications? No   Discharge Plan A Return to nursing home   Discharge Plan B Home Health;Home with family   DME Needed Upon Discharge  none   Anticipated Discharge Disposition SNF   Post-Acute Status   Post-Acute Authorization Placement   Post-Acute Placement Status Awaiting Internal Medical Clearance

## 2020-09-21 NOTE — PROGRESS NOTES
Ostomy care follow up:     Fourth lesson completed today. Met with patient and reviewed goals of education to include stoma and peristomal care, obtaining supplies, pouch emptying for ileostomy and urosotomy. Upon assessment noted patient's urostomy pouch and ileostomy pouch leaking and completed pouch change for both today. Ileostomy with small amount of green liquid stool. Urosotmy had small amount of urine in pouch. Ileostomy stoma size is 35 mm and pink with rosebud appearance. Urostomy stoma size is 38 mm and pink with rosebud appearance. Reviewed with patient pouch changing process for ileosotomy and urostomy. Patient verbalized the steps for the ostomy pouch change but did not want to complete the pouch changes herself. Provided additional supplies for both ostomies to bedside. Nursing and MD team notified regarding today's training. Wound Ostomy to continue to follow pt PRN [id number]

## 2020-09-21 NOTE — PT/OT/SLP RE-EVAL
Physical Therapy Re-evaluation    Patient Name:  Edita Riley   MRN:  [id number]    Recommendations:     Discharge Recommendations:  nursing facility, skilled   Discharge Equipment Recommendations: bath bench, walker, rolling, wheelchair   Barriers to discharge: Decreased caregiver support    Assessment:     Edita Riley is a 55 y.o. female admitted with a medical diagnosis of Bilateral ureteral obstruction.  She presents with the following impairments/functional limitations:  weakness, impaired endurance, impaired self care skills, impaired functional mobilty, gait instability, decreased lower extremity function, decreased coordination, impaired balance.    Patient demonstrates good motivation and decreased activity tolerance secondary to fatigue and weakness.  Patient demonstrates more weakness than previous visits secondary to low H&H and prolonged bedrest.  Patient required between mod A x 2 and CGA for bed mobility, transfers and ambulation.  Patient required mod A x 2 for sit to stand secondary to weakness and fatigued fairly quickly with ambulation.  Patient ambulated 20 ft x 2 with rolling walker, CGA and decreased stride length.  Patient will benefit from re initiation of PT for strengthening and mobility training in an acute care and Skilled nursing setting.      Rehab Prognosis:  Good; patient would benefit from acute skilled PT services to address these deficits and reach maximum level of function.      Recent Surgery: Procedure(s):  COLECTOMY, RIGHT Extended  CREATION, ILEOSTOMY 4 Days Post-Op    Plan:     During this hospitalization, patient to be seen 4 x/week to address the above listed problems via gait training, therapeutic activities, therapeutic exercises, neuromuscular re-education  · Plan of Care Expires:  10/21/20   Plan of Care Reviewed with: patient    Subjective     Communicated with RN prior to session.  Patient found sitting on edge of bed with colostomy,  telemetry, nephrostomy, oxygen, peripheral IV, PCA upon PT entry to room, agreeable to evaluation.      Chief Complaint: feeling weak  Patient comments/goals: get stronger  Pain/Comfort:  · Pain Rating 1: (did not rate)  · Location - Orientation 1: generalized  · Location 1: abdomen  · Pain Addressed 1: Pre-medicate for activity, Reposition, Cessation of Activity  · Pain Rating Post-Intervention 1: (did not rate)    Patients cultural, spiritual, Quaker conflicts given the current situation: no      Objective:     Patient found with: colostomy, telemetry, nephrostomy, oxygen, peripheral IV, PCA     General Precautions: Standard, fall   Orthopedic Precautions:N/A   Braces: N/A     Exams:  Exams:  RLE ROM: WFL  RLE Strength: grossly 4-/5  LLE ROM: WFL  LLE Strength: grossly 4-/5  Cognitive Exam:  Patient is oriented to Person, Place, Time and Situation  ·     Functional Mobility:  · Bed Mobility:     · Sit to Supine: moderate assistance for LE management  · Transfers:     · Sit to Stand:  moderate assistance and of 2 persons with rolling walker  · Gait: Patient ambulated 20 ft x 2 with rolling walker and CGA  · Balance:   · Sitting: FAIR+: Able to take MINIMAL challenges from all directions   · Standing: FAIR+: Takes MINIMAL challenges from all directions    AM-PAC 6 CLICK MOBILITY  Total Score:15       Therapeutic Activities and Exercises:   Gait training:  Patient required cues for position in walker and upright posture to increase independence and safety.  Patient required cues ~ 25% of the time.    Patient Education:    Patient educated on bed mobility training, Fall risk, gait training, home safety, Home exercise program, plan of care and transfer training by explanation.  Patient was receptive to education and needs reinforcement.    Patient left supine with all lines intact and call button in reach.    GOALS:   Multidisciplinary Problems     Physical Therapy Goals        Problem: Physical Therapy Goal     Goal Priority Disciplines Outcome Goal Variances Interventions   Physical Therapy Goal     PT, PT/OT Ongoing, Progressing     Description: Patient re-evaluated 20 s/p further surgery.  Goals to be met by: 10/5/2020    Patient will increase functional independence with mobility by performin. Supine to sit with Set-up [place]  2. Sit to supine with Set-up [place]  3. Sit to stand transfer with Supervision  4. Bed to chair transfer with Supervision using Rolling Walker  5. Gait  x 120 feet with Stand-by Assistance using Rolling Walker.   6. Ascend/descend 3 stair with left Handrails Stand-by Assistance                   History:     Past Medical History:   Diagnosis Date    Abnormal mammogram 2020    Anxiety     Cervical cancer     Chronic back pain     Depression     Diarrhea due to malabsorption 2018    Fibromyalgia     Generalized abdominal pain 2020    GERD (gastroesophageal reflux disease)     Hiatal hernia     History of cervical cancer 10/11/2018    History of cervical cancer 10/11/2018    Hx of psychiatric care     Cymbalta, trazodone    Hypertension     Hypomagnesemia 2018    Lactose intolerance     Metastatic squamous cell carcinoma to lymph node 10/11/2018    Nephrostomy tube displaced     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 drug 2018    Osteoarthritis of back     Psychiatric problem     Stroke 1972       Past Surgical History:   Procedure Laterality Date    BILATERAL OOPHORECTOMY  2015    CHOLECYSTECTOMY  2016    Done at [facility], path showed chronic cholecystitis and gallstones    cold knife conization  2014    COLECTOMY, RIGHT  2020    Procedure: COLECTOMY, RIGHT Extended;  Surgeon: Hammad Reynolds MD;  Location: [facility] OR [address] [place] [address];  Service: Colon and Rectal;;    COLONOSCOPY      COLONOSCOPY N/A 10/24/2016    at [facility], Dr Thomas    COLONOSCOPY N/A 2018    Procedure: COLONOSCOPY;  Surgeon: Arden WATTS  MD Brock;  Location: [facility] ENDO (4TH FLR);  Service: Endoscopy;  Laterality: N/A;    COLONOSCOPY N/A 7/28/2020    Procedure: COLONOSCOPY;  Surgeon: Hammad Reynolds MD;  Location: [facility] ENDO (4TH FLR);  Service: Colon and Rectal;  Laterality: N/A;  covid test [place] 7/25    CYSTOSCOPY WITH URETEROSCOPY, RETROGRADE PYELOGRAPHY, AND INSERTION OF STENT Bilateral 3/21/2020    Procedure: CYSTOSCOPY, WITH RETROGRADE PYELOGRAM,;  Surgeon: Leslie Balbuena MD;  Location: [facility] OR 1ST FLR;  Service: Urology;  Laterality: Bilateral;    ESOPHAGOGASTRODUODENOSCOPY  2014    HYSTERECTOMY  2014    [facility] for cervical cancer    ILEOSTOMY  9/17/2020    Procedure: CREATION, ILEOSTOMY;  Surgeon: Hammad Reynolds MD;  Location: [facility] OR 2ND FLR;  Service: Colon and Rectal;;    MOBILIZATION OF SPLENIC FLEXURE N/A 9/11/2020    Procedure: MOBILIZATION, COLONIC;  Surgeon: Hammad Reynolds MD;  Location: [facility] OR 2ND FLR;  Service: Colon and Rectal;  Laterality: N/A;    OOPHORECTOMY      RETROPERITONEAL LYMPHADENECTOMY Right 9/17/2018    Procedure: LYMPHADENECTOMY, RETROPERITONEUM;  Surgeon: Sebas Reed MD;  Location: [facility] OR 2ND FLR;  Service: General;  Laterality: Right;  ILIAC       Time Tracking:     PT Received On: 09/21/20  PT Start Time: 0906     PT Stop Time: 0944  PT Total Time (min): 38 min     Billable Minutes: Re-eval 10 min and Gait Training 28 min      Fermín Davila, PT  09/21/2020

## 2020-09-21 NOTE — PHYSICIAN QUERY
PT Name: Edita Riley  MR #: [id number]    Nutrition Clarification     CDS/: Britt Lino RN                 Contact information: [email or web address]    This form is a permanent document in the medical record.     Query Date: September 21, 2020    By submitting this query, we are merely seeking further clarification of documentation.. Please utilize your independent clinical judgment when addressing the question(s) below.    The medical record contains the following:   Indicators  Supporting Clinical Findings Location in Medical Record   X % of Estimated Energy Intake over a time frame from p.o., TF, or TPN % Intake of Estimated Energy Needs: 25 - 50 %  % Meal Intake: NPO    Energy Intake: <75% of estimated energy requirement for > 3 months    Energy Intake (Malnutrition): less than 75% for greater than or equal to 3 months  Nutrition PN 9/20   X Weight Status over a time frame % Weight Change From Usual Weight: -17.3 %    Weight Loss: 17% x 6 months      Weight Loss (Malnutrition): (17% x 1 month) Nutrition PN 9/20   X Subcutaneous Fat and/or Muscle Loss Upper Arm Region (Subcutaneous Fat Loss): well nourished   [place] Region (Muscle Loss): mild depletion  Clavicle Bone Region (Muscle Loss): mild depletion  Clavicle and Acromion Bone Region (Muscle Loss): well nourished  Anterior Thigh Region (Muscle Loss): well nourished  Nutrition PN 9/20    Fluid Accumulation or Edema     x Wt/BMI/Usual Body Weight BMI (Calculated): 28.9 Nutrition PN 9/20    Delayed Wound Healing/Failure to Thrive     x Acute or Chronic Illness Moderate Protein-Calorie Malnutrition  Malnutrition in the context of Chronic Illness/Injury Nutrition PN 9/20    Medication     x Treatment Recommend Custom TPN 95 gm AA / 275 gm Dex + IV lipids to provide 1815 kcal, 95 gm protein, and GIR of 2.15.  Nutrition PN 9/20    Other       AND / ASPEN Clinical Characteristics (October 2011)  A minimum of two characteristics is recommended for  diagnosing either moderate or severe malnutrition   Mild Malnutrition Moderate Malnutrition Severe Malnutrition   Energy Intake from p.o., TF or TPN. < 75% intake of estimated energy needs for less than 7 days < 75% intake of estimated energy needs for greater than 7 days < 50% intake of estimated energy needs for > 5 days   Weight Loss 1-2% in 1 month  5% in 3 months  7.5% in 6 months  10% in 1 year 1-2 % in 1 week  5% in 1 month  7.5% in 3 months  10% in 6 months  20% in 1 year > 2% in 1 week  > 5% in 1 month  > 7.5% in 3 months  > 10% in 6 months  > 20% in 1 year   Physical Findings     None *Mild subcutaneous fat and/or muscle loss  *Mild fluid accumulation  *Stage II decubitus  *Surgical wound or non-healing wound *Mod/severe subcutaneous fat and/or muscle loss  *Mod/severe fluid accumulation  *Stage III or IV decubitus  *Non-healing surgical wound     Provider, please specify diagnosis or diagnoses associated with above clinical findings.    [ x ] Moderate Protein-Calorie Malnutrition   [  ] Malnutrition, Unspecified degree   [  ] Abnormal Weight Loss   [  ] Other Nutritional Diagnosis (please specify): _______   [  ] Malnutrition ruled out   [  ] Clinically Undetermined       Please document in your progress notes daily for the duration of treatment until resolved and include in your discharge summary.

## 2020-09-21 NOTE — ASSESSMENT & PLAN NOTE
S/p urinary diversion with colon conduit 9/11   S/p emergent ex-lap 9/17, colo-colic anastomosis intact, bowel perforation found, underwent resection of right colon, remaining transverses colon, and proximal descending colon, Charlotte's pouch created     - CRS primary  - TPN per primary service  - Leukocytosis persists, however she is afebrile with normal vital signs  - PT/OT on board, OOBTC as tolerated, encourage ambulation   - Wound care ostomy consulted for assistance with ostomy teaching   - patient is a Oriental orthodox; Hgb decreased to 6.4, getting iron infusion; management per primary and Heme-Onc (on EPO, B12, and iron)   - if continues to drift , will need to hold lovenox   - Drains: maintain ureteral stents, and red rubber  - Prophylaxis: IS, SCDs, GI ppx

## 2020-09-21 NOTE — RESPIRATORY THERAPY
Rapid Response Respiratory Therapy ETCO2 Check     Time of visit: 08     Code Status: Full Code   : 1965  Bed: 524/524 A:   MRN: [id number]    SITUATION     Evaluated patient for: ETCO2 Compliance     BACKGROUND     Patient has a past medical history of Abnormal mammogram, Anxiety, Cervical cancer, Chronic back pain, Depression, Diarrhea due to malabsorption, Fibromyalgia, Generalized abdominal pain, GERD (gastroesophageal reflux disease), Hiatal hernia, History of cervical cancer, History of cervical cancer, psychiatric care, Hypertension, Hypomagnesemia, Lactose intolerance, Metastatic squamous cell carcinoma to lymph node, Nephrostomy tube displaced, 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 drug, Osteoarthritis of back, Psychiatric problem, and Stroke.    ASSESSMENT     Is the ETCO2 monitor on? (Yes/No)  Yes   Is the patient wearing a cannula? (Yes/No) Yes  Are ETCO2 orders placed? (Yes/No) Yes  Is the patient on a PCA pump? (Yes/No) Yes  ETCO2 monitored: ETCO2 (mmHg): 40 mmHg  O2 Device/Concentration: 1L NC  Pulse: 110 Respiratory rate: 23 Temperature: Temp: 97.6 °F (36.4 °C) BP: BP: 137/81 SpO2: 96%  Level of Consciousness: Level of Consciousness (AVPU): alert  All Lung Field Breath Sounds: All Lung Fields Breath Sounds: Anterior:, Lateral:, diminished  MARYANN Breath Sounds: diminished  LLL Breath Sounds: diminished  RUL Breath Sounds: diminished  RML Breath Sounds: diminished  RLL Breath Sounds: diminished  Ambu at bedside: Ambu bag with the patient?: Yes, Adult Ambu    INTERVENTIONS/RECOMMENDATIONS     We recommend patient continue to wear EtCO2 cannula while on PCA pump.  Call respiratory with any questions or concerns.    FOLLOW-UP     Please call back the Rapid Response RT, Aarti Montez, RRT at x [phone] for any questions or concerns.

## 2020-09-21 NOTE — PLAN OF CARE
Re-eval complete. Goals remain appropriate. Pt motivated for therapy and tolerated session well secondary to decreased endurance. Pt required Mod A x2 in sit<>stand due to weakness. Pt ambulated ~40ft CGA-Min A using RW to increase activity tolerance required for adls and functional mobility. Pt with slow gait and required seated rest breaks due to fatigue. Pt demonstrates decreased endurance and activity tolerance due to change in medical status. Pt would benefit from SNF upon d/c to return to [facility].      Problem: Occupational Therapy Goal  Goal: Occupational Therapy Goal  Description: Goals to be met by: 9/26/2020    Patient will increase functional independence with ADLs by performing:    LE Dressing with Supervision.  Grooming while standing with Supervision.  Toileting from toilet with Supervision for hygiene and clothing management.   Supine to sit with Supervision.  Toilet transfer to toilet with Supervision.    Outcome: Ongoing, Progressing

## 2020-09-21 NOTE — PHYSICIAN QUERY
PT Name: Edita Riley  MR #: [id number]    Relationship to Procedure Clarification     CDS/: Britt Lino RN                 Contact information: [email or web address]    This form is a permanent document in the medical record.     Query Date: September 21, 2020    Dear Provider,  By submitting this query, we are merely seeking further clarification of documentation. Please utilize your independent clinical judgment when addressing the question(s) below.    The medical record contains the following:  Supporting Clinical Findings Location in Medical Record   This afternoon patient was noted to have feculent drainage from ORESTES drain that was new from this morning. Also now with fever to 101 and tachycardia. Patient normotensive. Patient was started on IV Zosyn. CT with oral and IV contrast was ordered this afternoon and resulted concerning for colonic anastomotic leak.      CRS to take to OR for exploratory laparotomy, urology service will be present       PREOPERATIVE DIAGNOSES:  Pneumoperitoneum, suspected colocolonic anastomotic leak     POSTOPERATIVE DIAGNOSES:  Perforation of the colon distal to the colocolonic anastomosis with fecal peritonitis     PROCEDURES PERFORMED:  Exploratory laparotomy with washout, extended right hemicolectomy, creation of an end ileostomy    OPERATIVE FINDINGS:    1.  Fecal peritonitis  2.  Intact colocolonic anastomosis at the level of the midtransverse colon  3.  Perforation of the colon approximately 8 cm distal to the anastomosis    The midline skin staples were removed to re-open her laparotomy incision.  Subcutaneous tissues were  bluntly, and the fascial sutures were cut and removed to gain access to the peritoneal cavity.  Immediately upon entering the abdominal cavity there was a large amount of feculent contamination identified.  The abdomen was irrigated and suctioned.  Ultimately we were able to identify our colocolonic anastomosis at the level of  the midtransverse colon which actually appeared to be intact.  Approximately 8 cm distal to the anastomosis we identified a perforation of the colon that measured approximately 1-cm in diameter.  There was a large amount of inflammatory rind covering the majority of the small bowel and the mesentery.  The colon itself was thickened and inflamed, and the mesocolon was very inflamed and foreshortened.  The segment of transverse colon used as the urinary conduit was identified and noted to be intact and viable.     We began by mobilizing the colon away from the inflammatory adhesions to adjacent structures.  It was obvious that a repair of the perforation was not going to be possible and that she would require resection.  The distal transverse colon to the level of the splenic flexure appeared inflamed.  I could not tell if it was ischemic based on gross appearance due to the amount of fecal soiling of the tissues.  I elected to fully mobilize the splenic flexure and the descending colon.  I chose a point on the descending colon that appeared healthy and well vascularized as our distal point of division.  Was divided with a CARMEN stapler.  Mesentery of the distal transverse colon, splenic flexure, and descending colon was then divided with the LigaSure device.  We then turned our attention towards mobilizing the more proximal colon.  The remaining lateral attachments of the ascending colon were incised, and the right colon and hepatic flexure were fully mobilized.  I had initially hoped to divide the colon proximal to the anastomosis and perform an end transverse colostomy, however the mesentery was quite foreshortened would not reach the abdominal wall without tension for the purpose of creating a colostomy.  Additionally, her right colon was fairly short in length.  Rather than perform an end colostomy using short right colon, I elected to remove the remaining proximal colon and perform an end ileostomy.  The terminal  ileum was divided with a CARMEN stapler.  We then divided the remaining mesentery of the ascending colon, hepatic flexure, and proximal transverse colon.  The resected specimen was then passed from the field.     We then performed a thorough inspection of the abdominal cavity to be sure that there were no other missed injuries.  Dr. Balbuena was on hand to be sure that the urinary conduit appeared to be intact and was not compromised in any way.  I then ran the small bowel from the ligament of Treitz to the divided distal end.  There was a large amount of fibrinous inflammatory rind on the peritoneal surface of the small bowel but there were no obvious enterotomies noted.  The stomach appeared grossly normal as well.  We then copiously irrigated the abdomen with several L of warm saline, taking care to thoroughly irrigate the subphrenic space on either side as well as the pelvis.  Once we had fully irrigated the abdomen and ensured adequate hemostasis, we began preparation for creating an end ileostomy on the patient's right side.  A trephination was created in the right upper quadrant using a muscle-splitting technique.  The divided distal end of the terminal ileum was able to be delivered through the trephination using a Kenyatta clamp, maintaining proper mesenteric orientation.    PROCEDURES PERFORMED:    1.  Adhesiolysis  2.  Isolation and procurement of segment of transverse colon to be used as conduit for urinary diversion     Procedure(s) Performed:   1.  Colon conduit urinary diversion  2.  Bilateral ureteral stent placement Urology PN 9/17                OP 9/18                                                                                                            CRS OP note 9/11          Urology OP note 9/11         Please clarify if _colon perforation_ (as it relates to __isolation and procurement of segment of transverse colon to be used as conduit for urinary diversion and colon conduit urinary  diversion__) is:      [ x ] Complication of the procedure   [  ] Present, but not a complication of the procedure   [  ] Other (please specify): __________________   [  ] Clinically Undetermined       Please document in your progress notes daily for the duration of treatment until resolved and include in your discharge summary.

## 2020-09-21 NOTE — TELEPHONE ENCOUNTER
----- Message from Nicole Mujica NP sent at 9/9/2020  3:21 PM CDT -----  Tyrese,  Please notify of normal pap smear. Thank you

## 2020-09-21 NOTE — PT/OT/SLP RE-EVAL
Occupational Therapy   Re-evaluation    Name: Edita Riley  MRN: [id number]  Admitting Diagnosis:  Bilateral ureteral obstruction 4 Days Post-Op    Recommendations:     Discharge Recommendations: nursing facility, skilled  Discharge Equipment Recommendations:  bath bench, walker, rolling  Barriers to discharge:  None(increased level of assistance)    Assessment:     Edita Riley is a 55 y.o. female with a medical diagnosis of Bilateral ureteral obstruction.  She presents with the following  Performance deficits affecting function are weakness, impaired endurance, impaired self care skills, impaired functional mobilty, gait instability, impaired balance, decreased lower extremity function, decreased safety awareness, pain.      Re-eval complete. Goals remain appropriate. Pt motivated for therapy and tolerated session well secondary to decreased endurance. Pt required Mod A x2 in sit<>stand due to weakness. Pt ambulated ~40ft CGA-Min A using RW to increase activity tolerance required for adls and functional mobility. Pt with slow gait and required seated rest breaks due to fatigue. Pt demonstrates decreased endurance and activity tolerance due to change in medical status. Pt would benefit from SNF upon d/c to return to [facility].    Rehab Prognosis:  Good; patient would benefit from acute skilled OT services to address these deficits and reach maximum level of function.       Plan:     Patient to be seen 4 x/week to address the above listed problems via self-care/home management, therapeutic activities, therapeutic exercises  · Plan of Care Expires: 10/11/20  · Plan of Care Reviewed with: patient    Subjective     Chief Complaint: fatigue  Patient/Family stated goals: to get better  Communicated with: RN prior to session.  Pain/Comfort:  · Pain Rating 1: (did not rate)  · Location 1: abdomen  · Pain Addressed 1: Pre-medicate for activity, Reposition, Distraction    Objective:     Communicated with:  RN prior to session.  Patient found supine with: colostomy, nephrostomy, telemetry, peripheral IV upon OT entry to room.    General Precautions: Standard, fall   Orthopedic Precautions:N/A   Braces: N/A     Occupational Performance:    Bed Mobility:    · Patient completed Scooting/Bridging with stand by assistance  · Patient completed Supine to Sit with contact guard assistance  · Patient completed Sit to Supine with contact guard assistance    Functional Mobility/Transfers:  · Patient completed Sit <> Stand Transfer with moderate assistance and of 2 persons  with  rolling walker   · Patient completed Bed <> Chair Transfer using Step Transfer technique with moderate assistance and of 2 persons with rolling walker  · Functional Mobility: Re-eval complete. Goals remain appropriate. Pt motivated for therapy and tolerated session well secondary to decreased endurance. Pt required Mod A x2 in sit<>stand due to weakness. Pt ambulated ~40ft CGA-Min A using RW to increase activity tolerance required for adls and functional mobility. Pt with slow gait and required seated rest breaks due to fatigue. Pt demonstrates decreased endurance and activity tolerance due to change in medical status. Pt would benefit from SNF upon d/c to return to [facility].    Activities of Daily Living:  · Upper Body Dressing: minimum assistance eliza gown  · Lower Body Dressing: maximal assistance eliza socks    Cognitive/Visual Perceptual:  Cognitive/Psychosocial Skills:     -       Oriented to: Person, Place, Time and Situation   -       Safety awareness/insight to disability: intact     Physical Exam:  Upper Extremity Range of Motion:     -       Right Upper Extremity: WFL  -       Left Upper Extremity: WFL  Upper Extremity Strength:    -       Right Upper Extremity: WFL  -       Left Upper Extremity: WFL   Strength:    -       Right Upper Extremity: WFL  -       Left Upper Extremity: WFL      AMPA 6 Click:  [facility] Total Score:      Treatment &  Education:  Education:  - OT/POC-  - Importance of mobility to maximize recovery.  - Whiteboard updated  - safety w/ functional mobility; hand placement to ensure safe transfers to various surfaces in prep for ADLs  - Reviewed gait sequence and RW management via verbalization and demonstration   - encouraged to ambulate within household environment at least every hour to hour 1/2    Patient left supine with all lines intact, call button in reach and RN notified    GOALS:   Multidisciplinary Problems     Occupational Therapy Goals        Problem: Occupational Therapy Goal    Goal Priority Disciplines Outcome Interventions   Occupational Therapy Goal     OT, PT/OT Ongoing, Progressing    Description: Goals to be met by: 9/26/2020    Patient will increase functional independence with ADLs by performing:    LE Dressing with Supervision.  Grooming while standing with Supervision.  Toileting from toilet with Supervision for hygiene and clothing management.   Supine to sit with Supervision.  Toilet transfer to toilet with Supervision.                     History:     Past Medical History:   Diagnosis Date    Abnormal mammogram 8/25/2020    Anxiety     Cervical cancer 2014    Chronic back pain     Depression     Diarrhea due to malabsorption 11/14/2018    Fibromyalgia     Generalized abdominal pain 8/25/2020    GERD (gastroesophageal reflux disease)     Hiatal hernia 2014    History of cervical cancer 10/11/2018    History of cervical cancer 10/11/2018    Hx of psychiatric care     Cymbalta, trazodone    Hypertension     Hypomagnesemia 11/21/2018    Lactose intolerance     Metastatic squamous cell carcinoma to lymph node 10/11/2018    Nephrostomy tube displaced     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 drug 11/14/2018    Osteoarthritis of back     Psychiatric problem     Stroke 1972       Past Surgical History:   Procedure Laterality Date    BILATERAL OOPHORECTOMY  2015    CHOLECYSTECTOMY  11/09/2016     Done at [facility], path showed chronic cholecystitis and gallstones    cold knife conization  2014    COLECTOMY, RIGHT  9/17/2020    Procedure: COLECTOMY, RIGHT Extended;  Surgeon: Hammad Reynolds MD;  Location: [facility] OR 2ND FLR;  Service: Colon and Rectal;;    COLONOSCOPY  2014    COLONOSCOPY N/A 10/24/2016    at [facility], Dr Gutiérrez    COLONOSCOPY N/A 5/18/2018    Procedure: COLONOSCOPY;  Surgeon: Arden Gutiérrez MD;  Location: [facility] ENDO (4TH FLR);  Service: Endoscopy;  Laterality: N/A;    COLONOSCOPY N/A 7/28/2020    Procedure: COLONOSCOPY;  Surgeon: Hammad Reynolds MD;  Location: [facility] ENDO (4TH FLR);  Service: Colon and Rectal;  Laterality: N/A;  covid test [place] 7/25    CYSTOSCOPY WITH URETEROSCOPY, RETROGRADE PYELOGRAPHY, AND INSERTION OF STENT Bilateral 3/21/2020    Procedure: CYSTOSCOPY, WITH RETROGRADE PYELOGRAM,;  Surgeon: Leslie Balbuena MD;  Location: [facility] OR 1ST FLR;  Service: Urology;  Laterality: Bilateral;    ESOPHAGOGASTRODUODENOSCOPY  2014    HYSTERECTOMY  2014    [facility] for cervical cancer    ILEOSTOMY  9/17/2020    Procedure: CREATION, ILEOSTOMY;  Surgeon: Hammad Reynolds MD;  Location: [facility] OR 2ND FLR;  Service: Colon and Rectal;;    MOBILIZATION OF SPLENIC FLEXURE N/A 9/11/2020    Procedure: MOBILIZATION, COLONIC;  Surgeon: Hammad Reynolds MD;  Location: [facility] OR 2ND FLR;  Service: Colon and Rectal;  Laterality: N/A;    OOPHORECTOMY      RETROPERITONEAL LYMPHADENECTOMY Right 9/17/2018    Procedure: LYMPHADENECTOMY, RETROPERITONEUM;  Surgeon: Sebas Reed MD;  Location: [facility] OR 2ND FLR;  Service: General;  Laterality: Right;  ILIAC       Time Tracking:     OT Date of Treatment: 09/21/20  OT Start Time: 0906  OT Stop Time: 0945  OT Total Time (min): 39 min    Billable Minutes:Re-eval 10  Therapeutic Activity 29    Danielle Shantel, OT  9/21/2020

## 2020-09-21 NOTE — SUBJECTIVE & OBJECTIVE
Interval History:   Seen this morning, sitting up in bed, reports she slept well. Pain controlled. Tolerating ice chips without N/V.   No output from ileostomy yet     Review of Systems    Objective:     Temp:  [96 °F (35.6 °C)-97.7 °F (36.5 °C)] 97.6 °F (36.4 °C)  Pulse:  [] 104  Resp:  [12-17] 14  SpO2:  [97 %-99 %] 97 %  BP: (126-149)/(76-91) 137/81     Body mass index is 28.94 kg/m².           Drains     Drain                 Nephrostomy 08/13/20 0805 Left 12 Fr. 38 days         Nephrostomy 08/13/20 0809 Right 12 Fr. 38 days         Colostomy 09/18/20 0025 RUQ 2 days         Ileostomy 09/18/20 0025 LUQ 2 days         Nephrostomy 09/18/20 0025 Left 2 days         Nephrostomy 09/18/20 0025 Right 2 days                Physical Exam   Constitutional: No distress.   HENT:   Head: Normocephalic and atraumatic.   Eyes: Conjunctivae are normal. No scleral icterus.   Neck: Normal range of motion.   Cardiovascular: Normal rate.    Pulmonary/Chest: Effort normal. No respiratory distress.   Abdominal: Soft. She exhibits no distension. There is abdominal tenderness (appropriate). There is no rebound and no guarding.   Urostomy p/p/p draining clear yellow urine  Ureteral stents in place  Ileostomy on RLQ with bowel sweat present    Musculoskeletal: Normal range of motion.      Comments: SCDs in place   Neurological: She is alert.   Skin: Skin is warm and dry. She is not diaphoretic.         Significant Labs:    BMP:  Recent Labs   Lab 09/19/20  0408 09/20/20  0201 09/21/20  0422   * 152* 147*   K 3.6 3.3* 3.4*   * 115* 111*   CO2 24 28 26   BUN 24* 25* 21*   CREATININE 1.0 0.8 0.8   CALCIUM 8.2* 8.5* 8.3*       CBC:   Recent Labs   Lab 09/19/20  0408 09/20/20  0201 09/21/20  0422   WBC 13.52* 23.27*  23.27* 17.78*   HGB 6.8* 6.8*  6.8* 6.4*   HCT 21.7* 22.6*  22.6* 22.4*    382*  382* 421*       All pertinent labs results from the past 24 hours have been reviewed.    Significant Imaging:  All  pertinent imaging results/findings from the past 24 hours have been reviewed.

## 2020-09-21 NOTE — PLAN OF CARE
Problem: Physical Therapy Goal  Goal: Physical Therapy Goal  Description: Patient re-evaluated 20 s/p further surgery.  Goals to be met by: 10/5/2020    Patient will increase functional independence with mobility by performin. Supine to sit with Set-up [place]go  2. Sit to supine with Set-up [place]go  3. Sit to stand transfer with Supervision  4. Bed to chair transfer with Supervision using Rolling Walker  5. Gait  x 120 feet with Stand-by Assistance using Rolling Walker.   6. Ascend/descend 3 stair with left Handrails Stand-by Assistance  Outcome: Ongoing, Progressing     PT re-evaluation completed, goals and plan of care reviewed with patient.  Patient demonstrates good motivation and decreased activity tolerance secondary to fatigue and weakness.  Patient demonstrates more weakness than previous visits secondary to low H&H and prolonged bedrest.  Patient required between mod A x 2 and CGA for bed mobility, transfers and ambulation.  Patient required mod A x 2 for sit to stand secondary to weakness and fatigued fairly quickly with ambulation.  Patient ambulated 20 ft x 2 with rolling walker, CGA and decreased stride length.  Patient will benefit from re initiation of PT for strengthening and mobility training in an acute care and Skilled nursing setting.      Fermín Davila, PT, DPT  2020  Pager #: [phone]

## 2020-09-22 PROBLEM — D62 ACUTE BLOOD LOSS ANEMIA: Status: ACTIVE | Noted: 2020-09-22

## 2020-09-22 PROBLEM — K65.9 PERITONITIS: Status: ACTIVE | Noted: 2020-09-22

## 2020-09-22 LAB
ANION GAP SERPL CALC-SCNC: 12 MMOL/L (ref 8–16)
ANISOCYTOSIS BLD QL SMEAR: SLIGHT
BASOPHILS # BLD AUTO: 0.03 K/UL (ref 0–0.2)
BASOPHILS NFR BLD: 0.1 % (ref 0–1.9)
BUN SERPL-MCNC: 13 MG/DL (ref 6–20)
CALCIUM SERPL-MCNC: 8.1 MG/DL (ref 8.7–10.5)
CHLORIDE SERPL-SCNC: 109 MMOL/L (ref 95–110)
CO2 SERPL-SCNC: 26 MMOL/L (ref 23–29)
CREAT SERPL-MCNC: 0.8 MG/DL (ref 0.5–1.4)
DIFFERENTIAL METHOD: ABNORMAL
EOSINOPHIL # BLD AUTO: 0.1 K/UL (ref 0–0.5)
EOSINOPHIL NFR BLD: 0.3 % (ref 0–8)
ERYTHROCYTE [DISTWIDTH] IN BLOOD BY AUTOMATED COUNT: 16.9 % (ref 11.5–14.5)
EST. GFR  (AFRICAN AMERICAN): >60 ML/MIN/1.73 M^2
EST. GFR  (NON AFRICAN AMERICAN): >60 ML/MIN/1.73 M^2
GLUCOSE SERPL-MCNC: 201 MG/DL (ref 70–110)
HCT VFR BLD AUTO: 23.5 % (ref 37–48.5)
HGB BLD-MCNC: 7 G/DL (ref 12–16)
HYPOCHROMIA BLD QL SMEAR: ABNORMAL
IMM GRANULOCYTES # BLD AUTO: 0.94 K/UL (ref 0–0.04)
IMM GRANULOCYTES NFR BLD AUTO: 3.9 % (ref 0–0.5)
LYMPHOCYTES # BLD AUTO: 1.5 K/UL (ref 1–4.8)
LYMPHOCYTES NFR BLD: 6.2 % (ref 18–48)
MCH RBC QN AUTO: 26.6 PG (ref 27–31)
MCHC RBC AUTO-ENTMCNC: 29.8 G/DL (ref 32–36)
MCV RBC AUTO: 89 FL (ref 82–98)
MONOCYTES # BLD AUTO: 1.4 K/UL (ref 0.3–1)
MONOCYTES NFR BLD: 5.8 % (ref 4–15)
NEUTROPHILS # BLD AUTO: 20.3 K/UL (ref 1.8–7.7)
NEUTROPHILS NFR BLD: 83.7 % (ref 38–73)
NRBC BLD-RTO: 2 /100 WBC
OVALOCYTES BLD QL SMEAR: ABNORMAL
PLATELET # BLD AUTO: 471 K/UL (ref 150–350)
PLATELET BLD QL SMEAR: ABNORMAL
PMV BLD AUTO: 10.4 FL (ref 9.2–12.9)
POIKILOCYTOSIS BLD QL SMEAR: SLIGHT
POLYCHROMASIA BLD QL SMEAR: ABNORMAL
POTASSIUM SERPL-SCNC: 2.5 MMOL/L (ref 3.5–5.1)
RBC # BLD AUTO: 2.63 M/UL (ref 4–5.4)
SODIUM SERPL-SCNC: 147 MMOL/L (ref 136–145)
WBC # BLD AUTO: 24.21 K/UL (ref 3.9–12.7)

## 2020-09-22 PROCEDURE — 94761 N-INVAS EAR/PLS OXIMETRY MLT: CPT

## 2020-09-22 PROCEDURE — 11000001 HC ACUTE MED/SURG PRIVATE ROOM

## 2020-09-22 PROCEDURE — 25500020 PHARM REV CODE 255: Performed by: COLON & RECTAL SURGERY

## 2020-09-22 PROCEDURE — 63600175 PHARM REV CODE 636 W HCPCS: Performed by: STUDENT IN AN ORGANIZED HEALTH CARE EDUCATION/TRAINING PROGRAM

## 2020-09-22 PROCEDURE — 25000003 PHARM REV CODE 250: Performed by: STUDENT IN AN ORGANIZED HEALTH CARE EDUCATION/TRAINING PROGRAM

## 2020-09-22 PROCEDURE — A4216 STERILE WATER/SALINE, 10 ML: HCPCS | Performed by: STUDENT IN AN ORGANIZED HEALTH CARE EDUCATION/TRAINING PROGRAM

## 2020-09-22 PROCEDURE — 80048 BASIC METABOLIC PNL TOTAL CA: CPT

## 2020-09-22 PROCEDURE — 99900035 HC TECH TIME PER 15 MIN (STAT)

## 2020-09-22 PROCEDURE — 25000003 PHARM REV CODE 250: Performed by: NURSE PRACTITIONER

## 2020-09-22 PROCEDURE — A4217 STERILE WATER/SALINE, 500 ML: HCPCS | Performed by: NURSE PRACTITIONER

## 2020-09-22 PROCEDURE — 97116 GAIT TRAINING THERAPY: CPT | Mod: CQ

## 2020-09-22 PROCEDURE — B4185 PARENTERAL SOL 10 GM LIPIDS: HCPCS | Performed by: NURSE PRACTITIONER

## 2020-09-22 PROCEDURE — C9113 INJ PANTOPRAZOLE SODIUM, VIA: HCPCS | Performed by: STUDENT IN AN ORGANIZED HEALTH CARE EDUCATION/TRAINING PROGRAM

## 2020-09-22 PROCEDURE — 63600175 PHARM REV CODE 636 W HCPCS: Performed by: NURSE PRACTITIONER

## 2020-09-22 PROCEDURE — 94770 HC EXHALED C02 TEST: CPT

## 2020-09-22 PROCEDURE — 85025 COMPLETE CBC W/AUTO DIFF WBC: CPT

## 2020-09-22 RX ORDER — POTASSIUM CHLORIDE 7.45 MG/ML
40 INJECTION INTRAVENOUS
Status: COMPLETED | OUTPATIENT
Start: 2020-09-22 | End: 2020-09-23

## 2020-09-22 RX ORDER — LABETALOL HCL 20 MG/4 ML
10 SYRINGE (ML) INTRAVENOUS EVERY 4 HOURS PRN
Status: DISCONTINUED | OUTPATIENT
Start: 2020-09-22 | End: 2020-10-13 | Stop reason: HOSPADM

## 2020-09-22 RX ORDER — ONDANSETRON 2 MG/ML
4 INJECTION INTRAMUSCULAR; INTRAVENOUS EVERY 6 HOURS PRN
Status: DISCONTINUED | OUTPATIENT
Start: 2020-09-22 | End: 2020-10-13 | Stop reason: HOSPADM

## 2020-09-22 RX ORDER — POTASSIUM CHLORIDE 7.45 MG/ML
10 INJECTION INTRAVENOUS
Status: DISPENSED | OUTPATIENT
Start: 2020-09-22 | End: 2020-09-22

## 2020-09-22 RX ORDER — MAGNESIUM SULFATE HEPTAHYDRATE 40 MG/ML
2 INJECTION, SOLUTION INTRAVENOUS ONCE
Status: COMPLETED | OUTPATIENT
Start: 2020-09-22 | End: 2020-09-22

## 2020-09-22 RX ADMIN — Medication: at 01:09

## 2020-09-22 RX ADMIN — Medication 10 ML: at 12:09

## 2020-09-22 RX ADMIN — MAGNESIUM SULFATE HEPTAHYDRATE: 500 INJECTION, SOLUTION INTRAMUSCULAR; INTRAVENOUS at 11:09

## 2020-09-22 RX ADMIN — PIPERACILLIN SODIUM AND TAZOBACTAM SODIUM 4.5 G: 4; .5 INJECTION, POWDER, LYOPHILIZED, FOR SOLUTION INTRAVENOUS at 12:09

## 2020-09-22 RX ADMIN — Medication 10 ML: at 05:09

## 2020-09-22 RX ADMIN — POTASSIUM CHLORIDE 10 MEQ: 7.46 INJECTION, SOLUTION INTRAVENOUS at 11:09

## 2020-09-22 RX ADMIN — POTASSIUM CHLORIDE 10 MEQ: 7.46 INJECTION, SOLUTION INTRAVENOUS at 05:09

## 2020-09-22 RX ADMIN — IRON SUCROSE 200 MG: 20 INJECTION, SOLUTION INTRAVENOUS at 11:09

## 2020-09-22 RX ADMIN — PIPERACILLIN SODIUM AND TAZOBACTAM SODIUM 4.5 G: 4; .5 INJECTION, POWDER, LYOPHILIZED, FOR SOLUTION INTRAVENOUS at 03:09

## 2020-09-22 RX ADMIN — CYANOCOBALAMIN 100 MCG: 1000 INJECTION, SOLUTION INTRAMUSCULAR at 11:09

## 2020-09-22 RX ADMIN — PANTOPRAZOLE SODIUM 40 MG: 40 INJECTION, POWDER, LYOPHILIZED, FOR SOLUTION INTRAVENOUS at 11:09

## 2020-09-22 RX ADMIN — Medication 10 ML: at 06:09

## 2020-09-22 RX ADMIN — Medication 10 ML: at 01:09

## 2020-09-22 RX ADMIN — Medication 10 MG: at 03:09

## 2020-09-22 RX ADMIN — PIPERACILLIN SODIUM AND TAZOBACTAM SODIUM 4.5 G: 4; .5 INJECTION, POWDER, LYOPHILIZED, FOR SOLUTION INTRAVENOUS at 08:09

## 2020-09-22 RX ADMIN — ONDANSETRON 4 MG: 2 INJECTION INTRAMUSCULAR; INTRAVENOUS at 02:09

## 2020-09-22 RX ADMIN — POTASSIUM CHLORIDE 10 MEQ: 7.46 INJECTION, SOLUTION INTRAVENOUS at 04:09

## 2020-09-22 RX ADMIN — I.V. FAT EMULSION 250 ML: 20 EMULSION INTRAVENOUS at 11:09

## 2020-09-22 RX ADMIN — IOHEXOL 100 ML: 350 INJECTION, SOLUTION INTRAVENOUS at 03:09

## 2020-09-22 RX ADMIN — MAGNESIUM SULFATE IN WATER 2 G: 40 INJECTION, SOLUTION INTRAVENOUS at 05:09

## 2020-09-22 RX ADMIN — POTASSIUM CHLORIDE 10 MEQ: 7.46 INJECTION, SOLUTION INTRAVENOUS at 10:09

## 2020-09-22 RX ADMIN — POTASSIUM CHLORIDE 10 MEQ: 7.46 INJECTION, SOLUTION INTRAVENOUS at 12:09

## 2020-09-22 RX ADMIN — ENOXAPARIN SODIUM 40 MG: 40 INJECTION SUBCUTANEOUS at 05:09

## 2020-09-22 NOTE — PLAN OF CARE
Problem: Adult Inpatient Plan of Care  Goal: Plan of Care Review  Outcome: Ongoing, Progressing     Problem: Fall Injury Risk  Goal: Absence of Fall and Fall-Related Injury  Outcome: Ongoing, Progressing  AAOx4, VSS, Iv and PICC  and infusing, skin integrity maintained, pt independent with frequent position changes, no complaints of pain, urostomy and ileostomy intact and draining, fall precautions maintained with no falls noted during shift, bed in low locked position, call light within reach, ID band on, personal items in reach, will continue to monitor and follow plan of care.

## 2020-09-22 NOTE — RESPIRATORY THERAPY
Rapid Response Respiratory Therapy ETCO2 Check     Time of visit: 735      Code Status: Full Code   : 1965  Bed: 524/524 A:   MRN: [id number]    SITUATION     Evaluated patient for: ETCO2 Compliance     BACKGROUND     Patient has a past medical history of Abnormal mammogram, Anxiety, Cervical cancer, Chronic back pain, Depression, Diarrhea due to malabsorption, Fibromyalgia, Generalized abdominal pain, GERD (gastroesophageal reflux disease), Hiatal hernia, History of cervical cancer, History of cervical cancer, psychiatric care, Hypertension, Hypomagnesemia, Lactose intolerance, Metastatic squamous cell carcinoma to lymph node, Nephrostomy tube displaced, 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 drug, Osteoarthritis of back, Psychiatric problem, and Stroke.    ASSESSMENT     Is the ETCO2 monitor on? (Yes/No)  yes   Is the patient wearing a cannula? (Yes/No) yes  Are ETCO2 orders placed? (Yes/No) yes  Is the patient on a PCA pump? (Yes/No) yes  ETCO2 monitored: ETCO2 (mmHg): 31 mmHg  O2 Device/Concentration:   Pulse:  Respiratory rate:  Temperature: Temp: 98.7 °F (37.1 °C) BP: BP: 134/64 SpO2:   Level of Consciousness: Level of Consciousness (AVPU): responds to voice  All Lung Field Breath Sounds: All Lung Fields Breath Sounds: diminished, Lateral:, Posterior:, Anterior:  MARYANN Breath Sounds: diminished  LLL Breath Sounds: diminished  RUL Breath Sounds: diminished  RML Breath Sounds: diminished  RLL Breath Sounds: diminished  Ambu at bedside: Ambu bag with the patient?: Yes, Adult Ambu    INTERVENTIONS/RECOMMENDATIONS         PHYSICIAN ESCALATION     Physician Escalation (Yes/No):      Care discussed with:   Discussed plan of care primary RT,      FOLLOW-UP     Please call back the Rapid Response RT, Latonya Arce, RRT at x [phone] for any questions or concerns.

## 2020-09-22 NOTE — PROGRESS NOTES
[facility]  Colorectal Surgery  Progress Note    Patient Name: Edita Riley  MRN: [id number]  Admission Date: 9/11/2020  Hospital Length of Stay: 11 days  Attending Physician: Hammad Reynolds MD    Subjective:     Interval History: no acute events overnite, no n/v, adequate pain control, pos osotmy output    Post-Op Info:  Procedure(s):  COLECTOMY, RIGHT Extended  CREATION, ILEOSTOMY   4 Days Post-Op      Medications:  Continuous Infusions:   hydromorphone in 0.9 % NaCl 6 mg/30 ml      TPN ADULT CENTRAL LINE CUSTOM 60 mL/hr at 09/20/20 2252     Scheduled Meds:   cyanocobalamin  100 mcg Intramuscular Daily    [START ON 9/26/2020] cyanocobalamin  100 mcg Intramuscular Weekly    enoxaparin  40 mg Subcutaneous Q24H    epoetin yecenia-epbx  20,000 Units Subcutaneous Q48H    iron sucrose (VENOFER) IVPB  200 mg Intravenous Daily    pantoprazole  40 mg Intravenous Daily    piperacillin-tazobactam (ZOSYN) IVPB  4.5 g Intravenous Q8H    sodium chloride 0.9%  10 mL Intravenous Q6H     PRN Meds:   iohexol    naloxone    sodium chloride 0.9%    sodium chloride 0.9%    sodium chloride 0.9%        Objective:     Vital Signs (Most Recent):  Temp: 98.8 °F (37.1 °C) (09/21/20 1146)  Pulse: (!) 122 (09/21/20 1146)  Resp: 16 (09/21/20 1146)  BP: 127/71 (09/21/20 1146)  SpO2: 97 % (09/21/20 1146) Vital Signs (24h Range):  Temp:  [97.5 °F (36.4 °C)-98.8 °F (37.1 °C)] 98.8 °F (37.1 °C)  Pulse:  [] 122  Resp:  [13-23] 16  SpO2:  [96 %-99 %] 97 %  BP: (127-149)/(71-91) 127/71     Intake/Output - Last 3 Shifts       09/19 0700 - 09/20 0659 09/20 0700 - 09/21 0659 09/21 0700 - 09/22 0659    P.O. 0 0     IV Piggyback 200            Total Intake(mL/kg) 879 (9.9) 0 (0)     Urine (mL/kg/hr) 675 (0.3) 1200 (0.6)     Drains 250      Stool 0 0     Total Output 925 1200     Net -46 -1200            Stool Occurrence  0 x           Physical Exam  Constitutional:       Appearance: She is  well-developed. She is not ill-appearing.   HENT:      Head: Normocephalic.   Eyes:      Pupils: Pupils are equal, round, and reactive to light.   Cardiovascular:      Rate and Rhythm: Normal rate and regular rhythm.      Heart sounds: Normal heart sounds.   Pulmonary:      Effort: Pulmonary effort is normal. No respiratory distress.      Breath sounds: Normal breath sounds. No wheezing or rales.   Abdominal:      Palpations: Abdomen is soft. There is no mass.      Tenderness: There is no guarding or rebound.      Comments: abd inc line healing wlel  Ileostomy functional  urosotmy functional   Skin:     General: Skin is warm and dry.   Neurological:      Mental Status: She is alert and oriented to person, place, and time.   Psychiatric:         Behavior: Behavior normal.         Thought Content: Thought content normal.         Judgment: Judgment normal.         Significant Labs:  BMP (Last 3 Results):   Recent Labs   Lab 09/19/20  0408 09/20/20  0201 09/21/20  0422   * 136* 142*   * 152* 147*   K 3.6 3.3* 3.4*   * 115* 111*   CO2 24 28 26   BUN 24* 25* 21*   CREATININE 1.0 0.8 0.8   CALCIUM 8.2* 8.5* 8.3*   MG 2.4 2.4 1.8     CBC (Last 3 Results):   Recent Labs   Lab 09/19/20 0408 09/20/20  0201 09/21/20  0422   WBC 13.52* 23.27*  23.27* 17.78*   RBC 2.54* 2.53*  2.53* 2.45*   HGB 6.8* 6.8*  6.8* 6.4*   HCT 21.7* 22.6*  22.6* 22.4*    382*  382* 421*   MCV 85 89  89 91   MCH 26.8* 26.9*  26.9* 26.1*   MCHC 31.3* 30.1*  30.1* 28.6*       Significant Diagnostics:  None    Assessment/Plan:     * Bilateral ureteral obstruction  Ms. Riley is a 55 y.o. female with b/l ureteral obstruction s/p transverse colon conduit on 9/11/2020. Extended R colectomy and ileostomy creation on 9/17.     - Diet NPO, sips ok  - Continue TPN  - D/c daily cbc  - On Zosyn  - Remove NGT  - OOB, activity as tolerated  - PT/OT      Moderate malnutrition  Consult dietary          Kerri Castillo,  NP  Colorectal Surgery  [facility]

## 2020-09-22 NOTE — CARE UPDATE
Rapid Response Nurse Chart Check     Chart check completed, abnormal VS noted. Call [phone] for further concerns or assistance.

## 2020-09-22 NOTE — ASSESSMENT & PLAN NOTE
S/p urinary diversion with colon conduit 9/11   S/p emergent ex-lap 9/17, colo-colic anastomosis intact, bowel perforation found, underwent resection of right colon, remaining transverses colon, and proximal descending colon, Charlotte's pouch created     - CRS primary  - TPN per primary service  - rec replacement of NGT  - PT/OT on board, OOBTC as tolerated, encourage ambulation   - Wound care ostomy consulted for assistance with ostomy teaching   - patient is a L[employer] day; Hgb decreased to 6.4, getting iron infusion; management per primary and Heme-Onc (on EPO, B12, and iron)   - if continues to drift , will need to hold lovenox   - Drains: maintain ureteral stents, and red rubber  - Prophylaxis: IS, SCDs, GI ppx

## 2020-09-22 NOTE — H&P
Inpatient Radiology Pre-procedure Note    History of Present Illness:  Edita Riley is a 55 y.o. female with h/o cervical cancer s/p hysterectomy and pelvic radiation resulting in bilateral ureteral strictures for which she underwent surgery on 9/11 to create a transverse colon conduit for urinary diversion.  Her postoperative course was complicated by a bowel perforation requiring an additional surgery on 9/17 where her right, residual transverse, and proximal descending colon were resected and a RLQ end ileostomy was created.  Her WBC continued to rise prompting another CT AP demonstrating a moderate volume of fluid and air within the abdomen.  IR consulted to place drain.     Admission H&P reviewed.    Past Medical History:   Diagnosis Date    Abnormal mammogram 8/25/2020    Anxiety     Cervical cancer 2014    Chronic back pain     Depression     Diarrhea due to malabsorption 11/14/2018    Fibromyalgia     Generalized abdominal pain 8/25/2020    GERD (gastroesophageal reflux disease)     Hiatal hernia 2014    History of cervical cancer 10/11/2018    History of cervical cancer 10/11/2018    Hx of psychiatric care     Cymbalta, trazodone    Hypertension     Hypomagnesemia 11/21/2018    Lactose intolerance     Metastatic squamous cell carcinoma to lymph node 10/11/2018    Nephrostomy tube displaced     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 drug 11/14/2018    Osteoarthritis of back     Psychiatric problem     Stroke 1972     Past Surgical History:   Procedure Laterality Date    BILATERAL OOPHORECTOMY  2015    CHOLECYSTECTOMY  11/09/2016    Done at [facility], path showed chronic cholecystitis and gallstones    cold knife conization  2014    COLECTOMY, RIGHT  9/17/2020    Procedure: COLECTOMY, RIGHT Extended;  Surgeon: Hammad Reynolds MD;  Location: [facility] OR [address] [place] [address];  Service: Colon and Rectal;;    COLONOSCOPY  2014    COLONOSCOPY N/A 10/24/2016    at [facility], Dr Thomas    COLONOSCOPY  N/A 5/18/2018    Procedure: COLONOSCOPY;  Surgeon: Arden Gutiérrez MD;  Location: [facility] ENDO (4TH FLR);  Service: Endoscopy;  Laterality: N/A;    COLONOSCOPY N/A 7/28/2020    Procedure: COLONOSCOPY;  Surgeon: Hammad Reynolds MD;  Location: [facility] ENDO (4TH FLR);  Service: Colon and Rectal;  Laterality: N/A;  covid test [place] 7/25    CYSTOSCOPY WITH URETEROSCOPY, RETROGRADE PYELOGRAPHY, AND INSERTION OF STENT Bilateral 3/21/2020    Procedure: CYSTOSCOPY, WITH RETROGRADE PYELOGRAM,;  Surgeon: Leslie Balbuena MD;  Location: [facility] OR 1ST FLR;  Service: Urology;  Laterality: Bilateral;    ESOPHAGOGASTRODUODENOSCOPY  2014    HYSTERECTOMY  2014    TV[facility] for cervical cancer    ILEOSTOMY  9/17/2020    Procedure: CREATION, ILEOSTOMY;  Surgeon: Hammad Reynolds MD;  Location: [facility] OR 2ND FLR;  Service: Colon and Rectal;;    MOBILIZATION OF SPLENIC FLEXURE N/A 9/11/2020    Procedure: MOBILIZATION, COLONIC;  Surgeon: Hammad Reynolds MD;  Location: [facility] OR 2ND FLR;  Service: Colon and Rectal;  Laterality: N/A;    OOPHORECTOMY      RETROPERITONEAL LYMPHADENECTOMY Right 9/17/2018    Procedure: LYMPHADENECTOMY, RETROPERITONEUM;  Surgeon: Sebas Reed MD;  Location: [facility] OR 2ND FLR;  Service: General;  Laterality: Right;  ILIAC       Review of Systems:   As documented in primary team H&P    Home Meds:   Prior to Admission medications    Medication Sig Start Date End Date Taking? Authorizing Provider   dicyclomine (BENTYL) 20 mg tablet Take 1 tablet (20 mg total) by mouth 3 (three) times daily as needed.  Patient taking differently: Take 20 mg by mouth 3 (three) times daily as needed. Take if needed 9/1/20  Yes TONI Chowdary   gabapentin (NEURONTIN) 300 MG capsule Take 1 capsule (300 mg total) by mouth 3 (three) times daily.  Patient taking differently: Take 300 mg by mouth 3 (three) times daily. Take in am of surgery 9/3/19  Yes Refugio Benjamin MD   ketoconazole (NIZORAL) 2 % cream Apply topically once daily  to affected area  Patient taking differently: Apply topically once daily. Hold am of surgery 7/20/20  Yes Audrey Mustafa MD   metroNIDAZOLE (FLAGYL) 500 MG tablet Take 1 tablet (500 mg total) by mouth As instructed. Take 1 tablet at 1pm, 2pm, 11pm the day before surgery  Patient taking differently: Take 500 mg by mouth As instructed. Take 1 tablet at 1pm, 2pm, 11pm the day before surgery  Take per surgeons instructions 9/9/20  Yes Hammad Reynolds MD   neomycin (MYCIFRADIN) 500 mg Tab Take 2 tablets (1,000 mg total) by mouth As instructed. Take 2 tablets at 1pm, 2pm, 11pm the day before surgery  Patient taking differently: Take 1,000 mg by mouth As instructed. Take 2 tablets at 1pm, 2pm, 11pm the day before surgery.  Take per surgeons instructions 9/9/20  Yes Hammad Reynolds MD   omeprazole (PRILOSEC) 40 MG capsule Take 1 capsule (40 mg total) by mouth once daily.  Patient taking differently: Take 40 mg by mouth once daily. Take in am of surgery 7/10/20 7/10/21 Yes Audrey Mustafa MD   oxyCODONE (ROXICODONE) 5 MG immediate release tablet Take 1 tablet (5 mg total) by mouth every 8 (eight) hours as needed for Pain.  Patient taking differently: Take 5 mg by mouth every 8 (eight) hours as needed for Pain. Take if needed 6/12/20  Yes Eddie Bashir DO   polyethylene glycol (GLYCOLAX) 17 gram/dose powder Take 17 g by mouth once daily.  Patient taking differently: Take 17 g by mouth once daily. Hold am of surgery 9/1/20  Yes TONI Chowdary   traZODone (DESYREL) 50 MG tablet TAKE 1 TABLET (50 MG TOTAL) BY MOUTH EVERY EVENING.  Patient taking differently: Take 50 mg by mouth every evening. Takes at night 11/13/19 11/12/20 Yes Audrey Mustafa MD   ondansetron (ZOFRAN-ODT) 4 MG TbDL Take 1 tablet (4 mg total) by mouth every 8 (eight) hours as needed (nausea or vomiting).  Patient taking differently: Take 4 mg by mouth every 8 (eight) hours as needed (nausea or vomiting). Take if needed 8/6/20    Andrew Quiñones MD     Scheduled Meds:    cyanocobalamin  100 mcg Intramuscular Daily    [START ON 9/26/2020] cyanocobalamin  100 mcg Intramuscular Weekly    enoxaparin  40 mg Subcutaneous Q24H    epoetin yecenia-epbx  20,000 Units Subcutaneous Q48H    fat emulsion 20%  250 mL Intravenous Daily    magnesium sulfate IVPB  2 g Intravenous Once    pantoprazole  40 mg Intravenous Daily    piperacillin-tazobactam (ZOSYN) IVPB  4.5 g Intravenous Q8H    potassium chloride in water  10 mEq Intravenous Q1H    potassium chloride in water  40 mEq Intravenous Once    sodium chloride 0.9%  10 mL Intravenous Q6H     Continuous Infusions:    hydromorphone in 0.9 % NaCl 6 mg/30 ml      TPN ADULT CENTRAL LINE CUSTOM 60 mL/hr at 09/21/20 2246    TPN ADULT CENTRAL LINE CUSTOM       PRN Meds:iohexol, iohexol, labetalol, naloxone, ondansetron, sodium chloride 0.9%, Flushing PICC Protocol **AND** sodium chloride 0.9% **AND** sodium chloride 0.9%, sodium chloride 0.9%  Anticoagulants/Antiplatelets: no anticoagulation    Allergies:   Review of patient's allergies indicates:   Allergen Reactions    Bee sting [allergen ext-venom-honey bee]      Rash      Grass pollen-bermuda, standard      rash     Sedation Hx: have not been any systemic reactions    Labs:  Recent Labs   Lab 09/17/20  1545   INR 1.1       Recent Labs   Lab 09/22/20  0739   WBC 24.21*   HGB 7.0*   HCT 23.5*   MCV 89   *      Recent Labs   Lab 09/18/20  0022  09/21/20  0422 09/22/20  0739   *   < > 142* 201*      < > 147* 147*   K 3.7   < > 3.4* 2.5*   *   < > 111* 109   CO2 18*   < > 26 26   BUN 23*   < > 21* 13   CREATININE 0.9   < > 0.8 0.8   CALCIUM 7.9*   < > 8.3* 8.1*   MG  --    < > 1.8  --    ALT 10  --   --   --    AST 18  --   --   --    ALBUMIN 1.7*  --   --   --    BILITOT 0.5  --   --   --     < > = values in this interval not displayed.         Vitals:  Temp: 99.7 °F (37.6 °C) (09/22/20 1139)  Pulse: 100 (09/22/20  1139)  Resp: 18 (09/22/20 1609)  BP: (!) 152/88 (09/22/20 1232)  SpO2: 98 % (09/22/20 1139)     Physical Exam:  ASA: 3  Mallampati: 3    General: no acute distress  Mental Status: alert and oriented to person, place and time  HEENT: normocephalic, atraumatic  Chest: unlabored breathing  Heart: regular heart rate  Abdomen: RLQ end ileostomy, LLQ urostomy  Extremity: moves all extremities      Plan:  Sedation Plan: up to moderate  Patient will undergo drain placement to the RLQ air fluid collection tomorrow.  NPO at midnight. Hold evening dose of lovenox please.       Beatris Echols MD   Department of Radiology  PGY IV Resident  Pager: [phone]

## 2020-09-22 NOTE — PROGRESS NOTES
[facility]  Colorectal Surgery  Progress Note    Patient Name: Edita Riley  MRN: [id number]  Admission Date: 9/11/2020  Hospital Length of Stay: 11 days  Attending Physician: Hammad Reynolds MD    Subjective:     Interval History: no acute events overnite, had some emesis, ileostomy functional    Post-Op Info:  Procedure(s):  COLECTOMY, RIGHT Extended  CREATION, ILEOSTOMY   5 Days Post-Op      Medications:  Continuous Infusions:   hydromorphone in 0.9 % NaCl 6 mg/30 ml Stopped (09/22/20 1321)    TPN ADULT CENTRAL LINE CUSTOM 60 mL/hr at 09/21/20 2246    TPN ADULT CENTRAL LINE CUSTOM       Scheduled Meds:   cyanocobalamin  100 mcg Intramuscular Daily    [START ON 9/26/2020] cyanocobalamin  100 mcg Intramuscular Weekly    enoxaparin  40 mg Subcutaneous Q24H    epoetin yecenia-epbx  20,000 Units Subcutaneous Q48H    fat emulsion 20%  250 mL Intravenous Daily    pantoprazole  40 mg Intravenous Daily    piperacillin-tazobactam (ZOSYN) IVPB  4.5 g Intravenous Q8H    potassium chloride in water  10 mEq Intravenous Q1H    sodium chloride 0.9%  10 mL Intravenous Q6H     PRN Meds:   iohexol    iohexol    labetalol    naloxone    ondansetron    sodium chloride 0.9%    sodium chloride 0.9%    sodium chloride 0.9%        Objective:     Vital Signs (Most Recent):  Temp: 99.7 °F (37.6 °C) (09/22/20 1139)  Pulse: 100 (09/22/20 1139)  Resp: 20 (09/22/20 1315)  BP: (!) 152/88 (09/22/20 1232)  SpO2: 98 % (09/22/20 1139) Vital Signs (24h Range):  Temp:  [97.1 °F (36.2 °C)-99.9 °F (37.7 °C)] 99.7 °F (37.6 °C)  Pulse:  [] 100  Resp:  [14-29] 20  SpO2:  [96 %-99 %] 98 %  BP: (134-176)/(64-88) 152/88     Intake/Output - Last 3 Shifts       09/20 0700 - 09/21 0659 09/21 0700 - 09/22 0659 09/22 0700 - 09/23 0659    P.O. 0      IV Piggyback       TPN       Total Intake(mL/kg) 0 (0)      Urine (mL/kg/hr) 1200 (0.6) 950 (0.4) 400 (0.5)    Emesis/NG output  0     Drains       Stool 0 525 300     Total Output 1200 1475 700    Net -1200 -1475 -700           Stool Occurrence 0 x      Emesis Occurrence  1 x           Physical Exam  Constitutional:       Appearance: She is well-developed. She is ill-appearing.   HENT:      Head: Normocephalic.   Eyes:      Pupils: Pupils are equal, round, and reactive to light.   Cardiovascular:      Rate and Rhythm: Normal rate and regular rhythm.      Heart sounds: Normal heart sounds.   Pulmonary:      Effort: Pulmonary effort is normal. No respiratory distress.      Breath sounds: Normal breath sounds. No wheezing or rales.   Abdominal:      General: There is distension.      Palpations: Abdomen is soft. There is no mass.      Tenderness: There is no guarding or rebound.      Comments: abd inc line healing well  ostomy functiojnal  Urostomy functional   Skin:     General: Skin is warm and dry.   Neurological:      Mental Status: She is alert and oriented to person, place, and time.   Psychiatric:         Behavior: Behavior normal.         Thought Content: Thought content normal.         Judgment: Judgment normal.           Significant Labs:  BMP (Last 3 Results):   Recent Labs   Lab 09/19/20  0408 09/20/20  0201 09/21/20  0422 09/22/20  0739   * 136* 142* 201*   * 152* 147* 147*   K 3.6 3.3* 3.4* 2.5*   * 115* 111* 109   CO2 24 28 26 26   BUN 24* 25* 21* 13   CREATININE 1.0 0.8 0.8 0.8   CALCIUM 8.2* 8.5* 8.3* 8.1*   MG 2.4 2.4 1.8  --      CBC (Last 3 Results):   Recent Labs   Lab 09/20/20  0201 09/21/20  0422 09/22/20  0739   WBC 23.27*  23.27* 17.78* 24.21*   RBC 2.53*  2.53* 2.45* 2.63*   HGB 6.8*  6.8* 6.4* 7.0*   HCT 22.6*  22.6* 22.4* 23.5*   *  382* 421* 471*   MCV 89  89 91 89   MCH 26.9*  26.9* 26.1* 26.6*   MCHC 30.1*  30.1* 28.6* 29.8*       Significant Diagnostics:  CT today     Assessment/Plan:     * Bilateral ureteral obstruction  Ms. Darensbourg is a 55 y.o. female with b/l ureteral obstruction s/p transverse colon conduit  on 9/11/2020. Extended R colectomy and ileostomy creation on 9/17.     - Diet NPO, sips ok  - Continue TPN  - D/c daily cbc  - Zosyn  -   - OOB, activity as tolerated  - PT/OT      Acute blood loss anemia  Chronic anemia due to chronic dx and acute blood loss anemia r/t surgery  Monitor labs  No blood due to religiouos belief  Iron IV     Peritonitis  The patient is a 55-year-old female who is now 1 week out from a urinary diversion by Dr Balbuena due to bladder outlet obstruction from prior pelvic radiation therapy.  At time of that procedure, I procured a vascularized segment of transverse colon to be used as the urinary conduit, as requested by Urology.  A primary end-to-end, hand-sewn colocolonic anastomosis was performed at that time to restore intestinal continuity once the segment of colon to be used for the conduit had been excluded.  The patient did well for the 1st few days postoperatively but then began to develop abdominal pain and distention consistent with an ileus.  This morning, there was small amount of pneumoperitoneum seen on a plain abdominal film, and later today she began to drain feculent appearing fluid through her Kurtis-Arias drain.  She had a normal white blood cell count and did not have peritonitis on examination, so a CT scan was performed, which demonstrated a large volume of free abdominal fluid as well as a large volume of pneumoperitoneum, concerning for an anastomotic leak.  She is being brought back to the operating room for urgent exploratory laparotomy.    OR 9/17/2020    Cont TPN  Consult wound care for ileosotmy  Naseem vaughan, scd  Enc amb and IS  Some emesis this am, distention and leucocytosis- CT today           Moderate malnutrition  Consult dietary  TPN  Monitor labs    Severe episode of recurrent major depressive disorder, with psychotic features  Cont home m eds    Impaired functional mobility, balance, gait, and endurance  PT/OT    Essential hypertension  Chronic,  controlled.  Latest blood pressure and vitals reviewed-   Temp:  [97.1 °F (36.2 °C)-99.9 °F (37.7 °C)]   Pulse:  []   Resp:  [14-29]   BP: (134-176)/(64-88)   SpO2:  [96 %-99 %] .   Home meds for hypertension were reviewed and noted below. Hospital anti-hypertensive changes were made as shown below.  Hospital Medications             labetalol 20 mg/4 mL (5 mg/mL) IV syring 10 mg, Intravenous, Every 4 hours PRN, 1. Confirm patient on continuous cardiac monitor (obtain order if needed)<BR>2. Obstetrics is exempt from continuous cardiac monitoring. <BR>3. Monitor ECG rhythm throughout administrations noting rhythm and changes<BR>4. Monitor BP and HR pre-administration, post-administration, and every 30 minutes x1 after dose given<BR>5. Administer Labetalol at rate no faster than 10mg over 1 minute.<BR>6. Contraindications: HR &lt;50, SBP&lt;100, second or third degree AV block. If assessed, hold dose and call provider.        Will utilize p.r.n. blood pressure medication only if patient's blood pressure greater than  180/110 and she develops symptoms such as worsening chest pain or shortness of breath.          Kerri Castillo NP  Colorectal Surgery  [facility]

## 2020-09-22 NOTE — ASSESSMENT & PLAN NOTE
Chronic, controlled.  Latest blood pressure and vitals reviewed-   Temp:  [97.1 °F (36.2 °C)-99.9 °F (37.7 °C)]   Pulse:  []   Resp:  [14-29]   BP: (134-176)/(64-88)   SpO2:  [96 %-99 %] .   Home meds for hypertension were reviewed and noted below. Hospital anti-hypertensive changes were made as shown below.  Hospital Medications             labetalol 20 mg/4 mL (5 mg/mL) IV syring 10 mg, Intravenous, Every 4 hours PRN, 1. Confirm patient on continuous cardiac monitor (obtain order if needed)<BR>2. Obstetrics is exempt from continuous cardiac monitoring. <BR>3. Monitor ECG rhythm throughout administrations noting rhythm and changes<BR>4. Monitor BP and HR pre-administration, post-administration, and every 30 minutes x1 after dose given<BR>5. Administer Labetalol at rate no faster than 10mg over 1 minute.<BR>6. Contraindications: HR &lt;50, SBP&lt;100, second or third degree AV block. If assessed, hold dose and call provider.        Will utilize p.r.n. blood pressure medication only if patient's blood pressure greater than  180/110 and she develops symptoms such as worsening chest pain or shortness of breath.

## 2020-09-22 NOTE — PT/OT/SLP PROGRESS
Occupational Therapy      Patient Name:  Edita [facility]   MRN:  [id number]    Patient not seen today secondary to Pain. Pt educated on the importance of OOB mobility and risk of prolonged stay in the bed. Pt verbalized understanding but declined OOB at this time. OT unable to return for 2nd attempt in PM.  Will follow-up as appropriate.    Alanis Marquez, OT  9/22/2020

## 2020-09-22 NOTE — ASSESSMENT & PLAN NOTE
Ms. Riley is a 55 y.o. female with b/l ureteral obstruction s/p transverse colon conduit on 9/11/2020. Extended R colectomy and ileostomy creation on 9/17.     - Diet NPO, sips ok  - Continue TPN  - D/c daily cbc  - Zosyn  -   - OOB, activity as tolerated  - PT/OT

## 2020-09-22 NOTE — CONSULTS
Please see following H&P.     Beatris Echols MD   Department of Radiology  PGY IV Resident  Pager: [phone]

## 2020-09-22 NOTE — ASSESSMENT & PLAN NOTE
Chronic anemia due to chronic dx and acute blood loss anemia r/t surgery  Monitor labs  No blood due to religiouos belief  Iron IV

## 2020-09-22 NOTE — ASSESSMENT & PLAN NOTE
The patient is a 55-year-old female who is now 1 week out from a urinary diversion by Dr Balbuena due to bladder outlet obstruction from prior pelvic radiation therapy.  At time of that procedure, I procured a vascularized segment of transverse colon to be used as the urinary conduit, as requested by Urology.  A primary end-to-end, hand-sewn colocolonic anastomosis was performed at that time to restore intestinal continuity once the segment of colon to be used for the conduit had been excluded.  The patient did well for the 1st few days postoperatively but then began to develop abdominal pain and distention consistent with an ileus.  This morning, there was small amount of pneumoperitoneum seen on a plain abdominal film, and later today she began to drain feculent appearing fluid through her Kurtis-Arias drain.  She had a normal white blood cell count and did not have peritonitis on examination, so a CT scan was performed, which demonstrated a large volume of free abdominal fluid as well as a large volume of pneumoperitoneum, concerning for an anastomotic leak.  She is being brought back to the operating room for urgent exploratory laparotomy.    OR 9/17/2020    Cont TPN  Consult wound care for ileosotmy  Naseem vaughan, aubrey  Enc amb and IS  Some emesis this am, distention and leucocytosis- CT today

## 2020-09-22 NOTE — SUBJECTIVE & OBJECTIVE
Subjective:     Interval History: no acute events overnite, had some emesis, ileostomy functional    Post-Op Info:  Procedure(s):  COLECTOMY, RIGHT Extended  CREATION, ILEOSTOMY   5 Days Post-Op      Medications:  Continuous Infusions:   hydromorphone in 0.9 % NaCl 6 mg/30 ml Stopped (09/22/20 1321)    TPN ADULT CENTRAL LINE CUSTOM 60 mL/hr at 09/21/20 2246    TPN ADULT CENTRAL LINE CUSTOM       Scheduled Meds:   cyanocobalamin  100 mcg Intramuscular Daily    [START ON 9/26/2020] cyanocobalamin  100 mcg Intramuscular Weekly    enoxaparin  40 mg Subcutaneous Q24H    epoetin yecenia-epbx  20,000 Units Subcutaneous Q48H    fat emulsion 20%  250 mL Intravenous Daily    pantoprazole  40 mg Intravenous Daily    piperacillin-tazobactam (ZOSYN) IVPB  4.5 g Intravenous Q8H    potassium chloride in water  10 mEq Intravenous Q1H    sodium chloride 0.9%  10 mL Intravenous Q6H     PRN Meds:   iohexol    iohexol    labetalol    naloxone    ondansetron    sodium chloride 0.9%    sodium chloride 0.9%    sodium chloride 0.9%        Objective:     Vital Signs (Most Recent):  Temp: 99.7 °F (37.6 °C) (09/22/20 1139)  Pulse: 100 (09/22/20 1139)  Resp: 20 (09/22/20 1315)  BP: (!) 152/88 (09/22/20 1232)  SpO2: 98 % (09/22/20 1139) Vital Signs (24h Range):  Temp:  [97.1 °F (36.2 °C)-99.9 °F (37.7 °C)] 99.7 °F (37.6 °C)  Pulse:  [] 100  Resp:  [14-29] 20  SpO2:  [96 %-99 %] 98 %  BP: (134-176)/(64-88) 152/88     Intake/Output - Last 3 Shifts       09/20 0700 - 09/21 0659 09/21 0700 - 09/22 0659 09/22 0700 - 09/23 0659    P.O. 0      IV Piggyback       TPN       Total Intake(mL/kg) 0 (0)      Urine (mL/kg/hr) 1200 (0.6) 950 (0.4) 400 (0.5)    Emesis/NG output  0     Drains       Stool 0 525 300    Total Output 1200 1475 700    Net -1200 -1475 -700           Stool Occurrence 0 x      Emesis Occurrence  1 x           Physical Exam  Constitutional:       Appearance: She is well-developed. She is ill-appearing.   HENT:       Head: Normocephalic.   Eyes:      Pupils: Pupils are equal, round, and reactive to light.   Cardiovascular:      Rate and Rhythm: Normal rate and regular rhythm.      Heart sounds: Normal heart sounds.   Pulmonary:      Effort: Pulmonary effort is normal. No respiratory distress.      Breath sounds: Normal breath sounds. No wheezing or rales.   Abdominal:      General: There is distension.      Palpations: Abdomen is soft. There is no mass.      Tenderness: There is no guarding or rebound.      Comments: abd inc line healing well  ostomy functiojnal  Urostomy functional   Skin:     General: Skin is warm and dry.   Neurological:      Mental Status: She is alert and oriented to person, place, and time.   Psychiatric:         Behavior: Behavior normal.         Thought Content: Thought content normal.         Judgment: Judgment normal.           Significant Labs:  BMP (Last 3 Results):   Recent Labs   Lab 09/19/20  0408 09/20/20  0201 09/21/20  0422 09/22/20  0739   * 136* 142* 201*   * 152* 147* 147*   K 3.6 3.3* 3.4* 2.5*   * 115* 111* 109   CO2 24 28 26 26   BUN 24* 25* 21* 13   CREATININE 1.0 0.8 0.8 0.8   CALCIUM 8.2* 8.5* 8.3* 8.1*   MG 2.4 2.4 1.8  --      CBC (Last 3 Results):   Recent Labs   Lab 09/20/20  0201 09/21/20  0422 09/22/20  0739   WBC 23.27*  23.27* 17.78* 24.21*   RBC 2.53*  2.53* 2.45* 2.63*   HGB 6.8*  6.8* 6.4* 7.0*   HCT 22.6*  22.6* 22.4* 23.5*   *  382* 421* 471*   MCV 89  89 91 89   MCH 26.9*  26.9* 26.1* 26.6*   MCHC 30.1*  30.1* 28.6* 29.8*       Significant Diagnostics:  CT today

## 2020-09-22 NOTE — PROGRESS NOTES
[facility]  Urology  Progress Note    Patient Name: Edita Riley  MRN: [id number]  Admission Date: 9/11/2020  Hospital Length of Stay: 11 days  Code Status: Full Code   Attending Provider: Hmamad Reynolds MD   Primary Care Physician: Audrey Mustafa MD    Subjective:     HPI:  Edita Riley is a 55 y.o. female with a history of cervical cancer s/p pelvic radiation. She has since developed bilateral ureteral strictures and bilateral hydronephrosis R>L. She had a MAG3 scan confirmed presence of bilateral obstruction. She is s/p open transverse colon conduit urinary diversion on 9/11/2020.     Interval History:   Tachycardic to 120s overnight, normotensive  Actively dry heaving this morning, had bilious vomit on the floor next to the bed  Reports eating only few ice chips     Review of Systems    Objective:     Temp:  [97.1 °F (36.2 °C)-99.8 °F (37.7 °C)] 98.7 °F (37.1 °C)  Pulse:  [] 96  Resp:  [14-26] 26  SpO2:  [96 %-99 %] 96 %  BP: (127-176)/(64-82) 134/64     Body mass index is 28.94 kg/m².           Drains     Drain                 Nephrostomy 08/13/20 0805 Left 12 Fr. 38 days         Nephrostomy 08/13/20 0809 Right 12 Fr. 38 days         Colostomy 09/18/20 0025 RUQ 2 days         Ileostomy 09/18/20 0025 LUQ 2 days         Nephrostomy 09/18/20 0025 Left 2 days         Nephrostomy 09/18/20 0025 Right 2 days                Physical Exam   Constitutional: No distress.   HENT:   Head: Normocephalic and atraumatic.   Eyes: Conjunctivae are normal. No scleral icterus.   Neck: Normal range of motion.   Cardiovascular: Normal rate.    Pulmonary/Chest: Effort normal. No respiratory distress.   Abdominal: Soft. She exhibits no distension. There is abdominal tenderness (appropriate). There is no rebound and no guarding.   Urostomy p/p/p draining clear yellow urine  Ureteral stents in place  Ileostomy with dark liquid fluid output   Musculoskeletal: Normal range of motion.       Comments: SCDs in place   Neurological: She is alert.   Skin: Skin is warm and dry. She is not diaphoretic.         Significant Labs:    BMP:  Recent Labs   Lab 09/19/20  0408 09/20/20  0201 09/21/20  0422   * 152* 147*   K 3.6 3.3* 3.4*   * 115* 111*   CO2 24 28 26   BUN 24* 25* 21*   CREATININE 1.0 0.8 0.8   CALCIUM 8.2* 8.5* 8.3*       CBC:   Recent Labs   Lab 09/19/20  0408 09/20/20  0201 09/21/20  0422   WBC 13.52* 23.27*  23.27* 17.78*   HGB 6.8* 6.8*  6.8* 6.4*   HCT 21.7* 22.6*  22.6* 22.4*    382*  382* 421*       All pertinent labs results from the past 24 hours have been reviewed.    Significant Imaging:  All pertinent imaging results/findings from the past 24 hours have been reviewed.                  Assessment/Plan:     * Bilateral ureteral obstruction  S/p urinary diversion with colon conduit 9/11   S/p emergent ex-lap 9/17, colo-colic anastomosis intact, bowel perforation found, underwent resection of right colon, remaining transverses colon, and proximal descending colon, Charlotte's pouch created     - CRS primary  - TPN per primary service  - rec replacement of NGT  - PT/OT on board, OOBTC as tolerated, encourage ambulation   - Wound care ostomy consulted for assistance with ostomy teaching   - patient is a Sh[employer]; Hgb decreased to 6.4, getting iron infusion; management per primary and Heme-Onc (on EPO, B12, and iron)   - if continues to drift , will need to hold lovenox   - Drains: maintain ureteral stents, and red rubber  - Prophylaxis: IS, SCDs, GI ppx              VTE Risk Mitigation (From admission, onward)         Ordered     enoxaparin injection 40 mg  Every 24 hours      09/20/20 1025     IP VTE HIGH RISK PATIENT  Once      09/11/20 2024     Place sequential compression device  Until discontinued      09/11/20 2024                Mana Wilks MD  Urology  [facility]

## 2020-09-22 NOTE — PT/OT/SLP PROGRESS
"Physical Therapy Treatment    Patient Name:  Edita Riley   MRN:  [id number]    Recommendations:     Discharge Recommendations:  nursing facility, skilled   Discharge Equipment Recommendations: bath bench, walker, rolling, wheelchair   Barriers to discharge: Decreased caregiver support and increased assist level for all mobiliyt and self care    Assessment:     Edita Riley is a 55 y.o. female admitted with a medical diagnosis of Bilateral ureteral obstruction.  She presents with the following impairments/functional limitations:  weakness, impaired endurance, impaired functional mobilty, impaired self care skills, gait instability, impaired balance, decreased lower extremity function. Patient not feeling well otday, nauseated, vomiting. Ambulated very short distance today.     Rehab Prognosis: Good; patient would benefit from acute skilled PT services to address these deficits and reach maximum level of function.    Recent Surgery: Procedure(s):  COLECTOMY, RIGHT Extended  CREATION, ILEOSTOMY 5 Days Post-Op    Plan:     During this hospitalization, patient to be seen 4 x/week to address the identified rehab impairments via gait training, therapeutic activities, therapeutic exercises and progress toward the following goals:    · Plan of Care Expires:  10/21/20    Subjective     Chief Complaint: nausea  Patient/Family Comments/goals: "I feel terrible today".   Pain/Comfort:  · Pain Rating 1: 0/10      Objective:     Communicated with nurse prior to session.  Patient found up in chair with colostomy, telemetry, nephrostomy, oxygen, peripheral IV, PCA upon PT entry to room.     General Precautions: Standard, fall   Orthopedic Precautions:N/A   Braces: N/A     Functional Mobility:  · Bed Mobility:     · Sit to Supine: stand by assistance  · Transfers:     · Sit to Stand:  contact guard assistance with hand-held assist  · Gait: 6 ft with HHA, slow karma, decreased foot clearance, weakness. "       AM-PAC 6 CLICK MOBILITY  Turning over in bed (including adjusting bedclothes, sheets and blankets)?: 3  Sitting down on and standing up from a chair with arms (e.g., wheelchair, bedside commode, etc.): 2  Moving from lying on back to sitting on the side of the bed?: 3  Moving to and from a bed to a chair (including a wheelchair)?: 2  Need to walk in hospital room?: 3  Climbing 3-5 steps with a railing?: 2  Basic Mobility Total Score: 15       Therapeutic Activities and Exercises:   white board updated  Encouraged OOB when feeling better.     Patient left supine with all lines intact and call button in reach..    GOALS:   Multidisciplinary Problems     Physical Therapy Goals        Problem: Physical Therapy Goal    Goal Priority Disciplines Outcome Goal Variances Interventions   Physical Therapy Goal     PT, PT/OT Ongoing, Progressing     Description: Patient re-evaluated 20 s/p further surgery.  Goals to be met by: 10/5/2020    Patient will increase functional independence with mobility by performin. Supine to sit with Set-up [place]  2. Sit to supine with Set-up [place]  3. Sit to stand transfer with Supervision  4. Bed to chair transfer with Supervision using Rolling Walker  5. Gait  x 120 feet with Stand-by Assistance using Rolling Walker.   6. Ascend/descend 3 stair with left Handrails Stand-by Assistance                   Time Tracking:     PT Received On: 20  PT Start Time: 310     PT Stop Time: 318  PT Total Time (min): 8 min     Billable Minutes: Gait Training 8    Treatment Type: Treatment  PT/PTA: PTA     PTA Visit Number: Saima     Genveieve Boston, CARMEN  2020

## 2020-09-22 NOTE — CONSULTS
Inpatient consult to Physical Medicine Rehab  Consult performed by: Rylee Knapp NP  Consult ordered by: Hammad Reynolds MD  Reason for consult: assess rehab needs        Reviewed patient history and current admission.  Rehab team following.  Full consult to follow.    KATIUSKA Kline, FNP-C  Physical Medicine & Rehabilitation   09/22/2020

## 2020-09-22 NOTE — SUBJECTIVE & OBJECTIVE
Interval History:   Tachycardic to 120s overnight, normotensive  Actively dry heaving this morning, had bilious vomit on the floor next to the bed  Reports eating only few ice chips     Review of Systems    Objective:     Temp:  [97.1 °F (36.2 °C)-99.8 °F (37.7 °C)] 98.7 °F (37.1 °C)  Pulse:  [] 96  Resp:  [14-26] 26  SpO2:  [96 %-99 %] 96 %  BP: (127-176)/(64-82) 134/64     Body mass index is 28.94 kg/m².           Drains     Drain                 Nephrostomy 08/13/20 0805 Left 12 Fr. 38 days         Nephrostomy 08/13/20 0809 Right 12 Fr. 38 days         Colostomy 09/18/20 0025 RUQ 2 days         Ileostomy 09/18/20 0025 LUQ 2 days         Nephrostomy 09/18/20 0025 Left 2 days         Nephrostomy 09/18/20 0025 Right 2 days                Physical Exam   Constitutional: No distress.   HENT:   Head: Normocephalic and atraumatic.   Eyes: Conjunctivae are normal. No scleral icterus.   Neck: Normal range of motion.   Cardiovascular: Normal rate.    Pulmonary/Chest: Effort normal. No respiratory distress.   Abdominal: Soft. She exhibits no distension. There is abdominal tenderness (appropriate). There is no rebound and no guarding.   Urostomy p/p/p draining clear yellow urine  Ureteral stents in place  Ileostomy with dark liquid fluid output   Musculoskeletal: Normal range of motion.      Comments: SCDs in place   Neurological: She is alert.   Skin: Skin is warm and dry. She is not diaphoretic.         Significant Labs:    BMP:  Recent Labs   Lab 09/19/20  0408 09/20/20  0201 09/21/20  0422   * 152* 147*   K 3.6 3.3* 3.4*   * 115* 111*   CO2 24 28 26   BUN 24* 25* 21*   CREATININE 1.0 0.8 0.8   CALCIUM 8.2* 8.5* 8.3*       CBC:   Recent Labs   Lab 09/19/20  0408 09/20/20  0201 09/21/20  0422   WBC 13.52* 23.27*  23.27* 17.78*   HGB 6.8* 6.8*  6.8* 6.4*   HCT 21.7* 22.6*  22.6* 22.4*    382*  382* 421*       All pertinent labs results from the past 24 hours have been reviewed.    Significant  Imaging:  All pertinent imaging results/findings from the past 24 hours have been reviewed.

## 2020-09-22 NOTE — PLAN OF CARE
EDWARDO spoke to Carlos wagner/Joanie Dewey who reports pt referral is currently under review, however as reported pt's level of medical need is too high. SW faxed additional referral to [facility] to provide additional placement for the pt. CM team plans to continue to follow.    Kira Dawkins, DELPHINE  Case Management   [facility]   Ext. [phone]

## 2020-09-23 LAB
ANION GAP SERPL CALC-SCNC: 11 MMOL/L (ref 8–16)
ANISOCYTOSIS BLD QL SMEAR: SLIGHT
BASO STIPL BLD QL SMEAR: ABNORMAL
BASOPHILS NFR BLD: 0 % (ref 0–1.9)
BODY FLUID SOURCE, CREATININE: NORMAL
BUN SERPL-MCNC: 14 MG/DL (ref 6–20)
CALCIUM SERPL-MCNC: 7.8 MG/DL (ref 8.7–10.5)
CHLORIDE SERPL-SCNC: 108 MMOL/L (ref 95–110)
CO2 SERPL-SCNC: 23 MMOL/L (ref 23–29)
CREAT FLD-MCNC: 0.7 MG/DL
CREAT SERPL-MCNC: 0.9 MG/DL (ref 0.5–1.4)
DIFFERENTIAL METHOD: ABNORMAL
EOSINOPHIL NFR BLD: 0 % (ref 0–8)
ERYTHROCYTE [DISTWIDTH] IN BLOOD BY AUTOMATED COUNT: 17.9 % (ref 11.5–14.5)
EST. GFR  (AFRICAN AMERICAN): >60 ML/MIN/1.73 M^2
EST. GFR  (NON AFRICAN AMERICAN): >60 ML/MIN/1.73 M^2
GIANT PLATELETS BLD QL SMEAR: PRESENT
GLUCOSE SERPL-MCNC: 203 MG/DL (ref 70–110)
GRAM STN SPEC: NORMAL
HCT VFR BLD AUTO: 22.9 % (ref 37–48.5)
HGB BLD-MCNC: 6.7 G/DL (ref 12–16)
HYPOCHROMIA BLD QL SMEAR: ABNORMAL
IMM GRANULOCYTES # BLD AUTO: ABNORMAL K/UL (ref 0–0.04)
IMM GRANULOCYTES NFR BLD AUTO: ABNORMAL % (ref 0–0.5)
LYMPHOCYTES NFR BLD: 7 % (ref 18–48)
MCH RBC QN AUTO: 26.7 PG (ref 27–31)
MCHC RBC AUTO-ENTMCNC: 29.3 G/DL (ref 32–36)
MCV RBC AUTO: 91 FL (ref 82–98)
MONOCYTES NFR BLD: 6 % (ref 4–15)
NEUTROPHILS NFR BLD: 87 % (ref 38–73)
NRBC BLD-RTO: 3 /100 WBC
OVALOCYTES BLD QL SMEAR: ABNORMAL
PLATELET # BLD AUTO: 486 K/UL (ref 150–350)
PLATELET BLD QL SMEAR: ABNORMAL
PMV BLD AUTO: 10.1 FL (ref 9.2–12.9)
POCT GLUCOSE: 149 MG/DL (ref 70–110)
POCT GLUCOSE: 173 MG/DL (ref 70–110)
POCT GLUCOSE: 212 MG/DL (ref 70–110)
POIKILOCYTOSIS BLD QL SMEAR: SLIGHT
POLYCHROMASIA BLD QL SMEAR: ABNORMAL
POTASSIUM SERPL-SCNC: 3.1 MMOL/L (ref 3.5–5.1)
RBC # BLD AUTO: 2.51 M/UL (ref 4–5.4)
SODIUM SERPL-SCNC: 142 MMOL/L (ref 136–145)
TOXIC GRANULES BLD QL SMEAR: PRESENT
WBC # BLD AUTO: 23.17 K/UL (ref 3.9–12.7)

## 2020-09-23 PROCEDURE — A4216 STERILE WATER/SALINE, 10 ML: HCPCS | Performed by: STUDENT IN AN ORGANIZED HEALTH CARE EDUCATION/TRAINING PROGRAM

## 2020-09-23 PROCEDURE — 97530 THERAPEUTIC ACTIVITIES: CPT

## 2020-09-23 PROCEDURE — A4217 STERILE WATER/SALINE, 500 ML: HCPCS | Performed by: COLON & RECTAL SURGERY

## 2020-09-23 PROCEDURE — 87075 CULTR BACTERIA EXCEPT BLOOD: CPT

## 2020-09-23 PROCEDURE — 25000003 PHARM REV CODE 250: Performed by: COLON & RECTAL SURGERY

## 2020-09-23 PROCEDURE — 94761 N-INVAS EAR/PLS OXIMETRY MLT: CPT

## 2020-09-23 PROCEDURE — 63600175 PHARM REV CODE 636 W HCPCS: Performed by: STUDENT IN AN ORGANIZED HEALTH CARE EDUCATION/TRAINING PROGRAM

## 2020-09-23 PROCEDURE — B4185 PARENTERAL SOL 10 GM LIPIDS: HCPCS | Performed by: COLON & RECTAL SURGERY

## 2020-09-23 PROCEDURE — 87116 MYCOBACTERIA CULTURE: CPT

## 2020-09-23 PROCEDURE — 87102 FUNGUS ISOLATION CULTURE: CPT

## 2020-09-23 PROCEDURE — 99900035 HC TECH TIME PER 15 MIN (STAT)

## 2020-09-23 PROCEDURE — 36415 COLL VENOUS BLD VENIPUNCTURE: CPT

## 2020-09-23 PROCEDURE — 63600175 PHARM REV CODE 636 W HCPCS: Performed by: COLON & RECTAL SURGERY

## 2020-09-23 PROCEDURE — 99232 SBSQ HOSP IP/OBS MODERATE 35: CPT | Mod: ,,, | Performed by: NURSE PRACTITIONER

## 2020-09-23 PROCEDURE — 97116 GAIT TRAINING THERAPY: CPT | Mod: CQ

## 2020-09-23 PROCEDURE — 80048 BASIC METABOLIC PNL TOTAL CA: CPT

## 2020-09-23 PROCEDURE — 87070 CULTURE OTHR SPECIMN AEROBIC: CPT

## 2020-09-23 PROCEDURE — 11000001 HC ACUTE MED/SURG PRIVATE ROOM

## 2020-09-23 PROCEDURE — 82570 ASSAY OF URINE CREATININE: CPT

## 2020-09-23 PROCEDURE — 85027 COMPLETE CBC AUTOMATED: CPT

## 2020-09-23 PROCEDURE — 97535 SELF CARE MNGMENT TRAINING: CPT

## 2020-09-23 PROCEDURE — 94770 HC EXHALED C02 TEST: CPT

## 2020-09-23 PROCEDURE — C9113 INJ PANTOPRAZOLE SODIUM, VIA: HCPCS | Performed by: STUDENT IN AN ORGANIZED HEALTH CARE EDUCATION/TRAINING PROGRAM

## 2020-09-23 PROCEDURE — 87205 SMEAR GRAM STAIN: CPT

## 2020-09-23 PROCEDURE — 97110 THERAPEUTIC EXERCISES: CPT | Mod: CQ

## 2020-09-23 PROCEDURE — 63600175 PHARM REV CODE 636 W HCPCS: Performed by: NURSE PRACTITIONER

## 2020-09-23 PROCEDURE — 99232 PR SUBSEQUENT HOSPITAL CARE,LEVL II: ICD-10-PCS | Mod: ,,, | Performed by: NURSE PRACTITIONER

## 2020-09-23 PROCEDURE — 85007 BL SMEAR W/DIFF WBC COUNT: CPT

## 2020-09-23 PROCEDURE — 87206 SMEAR FLUORESCENT/ACID STAI: CPT

## 2020-09-23 PROCEDURE — 25000003 PHARM REV CODE 250: Performed by: STUDENT IN AN ORGANIZED HEALTH CARE EDUCATION/TRAINING PROGRAM

## 2020-09-23 RX ORDER — POTASSIUM CHLORIDE 7.45 MG/ML
40 INJECTION INTRAVENOUS
Status: DISCONTINUED | OUTPATIENT
Start: 2020-09-23 | End: 2020-09-23

## 2020-09-23 RX ORDER — POTASSIUM CHLORIDE 7.45 MG/ML
10 INJECTION INTRAVENOUS
Status: COMPLETED | OUTPATIENT
Start: 2020-09-23 | End: 2020-09-23

## 2020-09-23 RX ORDER — FENTANYL CITRATE 50 UG/ML
INJECTION, SOLUTION INTRAMUSCULAR; INTRAVENOUS CODE/TRAUMA/SEDATION MEDICATION
Status: COMPLETED | OUTPATIENT
Start: 2020-09-23 | End: 2020-09-23

## 2020-09-23 RX ORDER — GLUCAGON 1 MG
1 KIT INJECTION
Status: DISCONTINUED | OUTPATIENT
Start: 2020-09-23 | End: 2020-10-13 | Stop reason: HOSPADM

## 2020-09-23 RX ORDER — INSULIN ASPART 100 [IU]/ML
1-10 INJECTION, SOLUTION INTRAVENOUS; SUBCUTANEOUS EVERY 4 HOURS PRN
Status: DISCONTINUED | OUTPATIENT
Start: 2020-09-23 | End: 2020-10-13 | Stop reason: HOSPADM

## 2020-09-23 RX ORDER — MIDAZOLAM HYDROCHLORIDE 1 MG/ML
INJECTION INTRAMUSCULAR; INTRAVENOUS CODE/TRAUMA/SEDATION MEDICATION
Status: COMPLETED | OUTPATIENT
Start: 2020-09-23 | End: 2020-09-23

## 2020-09-23 RX ADMIN — ONDANSETRON 4 MG: 2 INJECTION INTRAMUSCULAR; INTRAVENOUS at 11:09

## 2020-09-23 RX ADMIN — Medication 10 ML: at 12:09

## 2020-09-23 RX ADMIN — POTASSIUM CHLORIDE 10 MEQ: 7.46 INJECTION, SOLUTION INTRAVENOUS at 02:09

## 2020-09-23 RX ADMIN — FENTANYL CITRATE 50 MCG: 50 INJECTION INTRAMUSCULAR; INTRAVENOUS at 07:09

## 2020-09-23 RX ADMIN — POTASSIUM CHLORIDE 10 MEQ: 7.46 INJECTION, SOLUTION INTRAVENOUS at 09:09

## 2020-09-23 RX ADMIN — EPOETIN ALFA-EPBX 20000 UNITS: 10000 INJECTION, SOLUTION INTRAVENOUS; SUBCUTANEOUS at 04:09

## 2020-09-23 RX ADMIN — MAGNESIUM SULFATE HEPTAHYDRATE: 500 INJECTION, SOLUTION INTRAMUSCULAR; INTRAVENOUS at 09:09

## 2020-09-23 RX ADMIN — POTASSIUM CHLORIDE 10 MEQ: 7.46 INJECTION, SOLUTION INTRAVENOUS at 12:09

## 2020-09-23 RX ADMIN — MIDAZOLAM HYDROCHLORIDE 0.5 MG: 1 INJECTION, SOLUTION INTRAMUSCULAR; INTRAVENOUS at 07:09

## 2020-09-23 RX ADMIN — POTASSIUM CHLORIDE 10 MEQ: 7.46 INJECTION, SOLUTION INTRAVENOUS at 11:09

## 2020-09-23 RX ADMIN — MIDAZOLAM HYDROCHLORIDE 1 MG: 1 INJECTION, SOLUTION INTRAMUSCULAR; INTRAVENOUS at 07:09

## 2020-09-23 RX ADMIN — ONDANSETRON 4 MG: 2 INJECTION INTRAMUSCULAR; INTRAVENOUS at 02:09

## 2020-09-23 RX ADMIN — PIPERACILLIN SODIUM AND TAZOBACTAM SODIUM 4.5 G: 4; .5 INJECTION, POWDER, LYOPHILIZED, FOR SOLUTION INTRAVENOUS at 08:09

## 2020-09-23 RX ADMIN — Medication 10 ML: at 11:09

## 2020-09-23 RX ADMIN — INSULIN ASPART 1 UNITS: 100 INJECTION, SOLUTION INTRAVENOUS; SUBCUTANEOUS at 08:09

## 2020-09-23 RX ADMIN — Medication 10 ML: at 06:09

## 2020-09-23 RX ADMIN — POTASSIUM CHLORIDE 10 MEQ: 7.46 INJECTION, SOLUTION INTRAVENOUS at 01:09

## 2020-09-23 RX ADMIN — PANTOPRAZOLE SODIUM 40 MG: 40 INJECTION, POWDER, LYOPHILIZED, FOR SOLUTION INTRAVENOUS at 09:09

## 2020-09-23 RX ADMIN — PIPERACILLIN SODIUM AND TAZOBACTAM SODIUM 4.5 G: 4; .5 INJECTION, POWDER, LYOPHILIZED, FOR SOLUTION INTRAVENOUS at 04:09

## 2020-09-23 RX ADMIN — INSULIN ASPART 4 UNITS: 100 INJECTION, SOLUTION INTRAVENOUS; SUBCUTANEOUS at 12:09

## 2020-09-23 RX ADMIN — PIPERACILLIN SODIUM AND TAZOBACTAM SODIUM 4.5 G: 4; .5 INJECTION, POWDER, LYOPHILIZED, FOR SOLUTION INTRAVENOUS at 11:09

## 2020-09-23 RX ADMIN — Medication 10 ML: at 05:09

## 2020-09-23 RX ADMIN — CYANOCOBALAMIN 100 MCG: 1000 INJECTION, SOLUTION INTRAMUSCULAR at 09:09

## 2020-09-23 RX ADMIN — FENTANYL CITRATE 25 MCG: 50 INJECTION INTRAMUSCULAR; INTRAVENOUS at 07:09

## 2020-09-23 RX ADMIN — I.V. FAT EMULSION 250 ML: 20 EMULSION INTRAVENOUS at 09:09

## 2020-09-23 NOTE — HPI
Per chart review, Edita Riley is a 55-year-old female with PMHx of cervical cancer s/p pelvic radiation and bilateral ureteral strictures and bilateral hydronephrosis R>L s/p percutaneous nephrostomy tubes.  [place] [facility] [place] for scheduled s/p open transverse colon conduit urinary diversion on 9/11/2020 with take back to OR 9/17 for extended R colectomy and ileostomy creation 2/2 peritonitis . Hospital course further complicated by anemia, moderate malnutrition (on TPN), and pain (PCA pump infusing).     Functional History: Patient lives with spouse and daughter in a single  story home with 3 steps to enter.  Prior to admission, (I) with ADLs and mobility. DME: [facility].

## 2020-09-23 NOTE — NURSING
Oncoming nurse notified of x2 late potassium. Pharmacy called for the 1715 ordered 40 mEq of potassium and stated that he would verified. Oncoming nurse aware

## 2020-09-23 NOTE — CARE UPDATE
Rapid Response Nurse Chart Check     Chart check completed, VSS   bedside RN, Kayleigh contacted. No concerns verbalized at this time. Instructed to call [phone] for further concerns or assistance.

## 2020-09-23 NOTE — CONSULTS
[facility]  Physical Medicine & Rehab  Consult Note    Patient Name: Edita Riley  MRN: [id number]  Admission Date: 9/11/2020  Hospital Length of Stay: 12 days  Attending Physician: Hammad Reynolds MD     Inpatient consult to Physical Medicine & Rehabilitation  Consult performed by: Rylee Knapp NP  Consult requested by:  Hammad Reynolds MD    Reason for Consult:  assess rehabilitation needs    Consults  Subjective:     Principal Problem: Bilateral ureteral obstruction    HPI: Per chart review, Edita Riley is a 55-year-old female with PMHx of stroke, cervical cancer s/p pelvic radiation and bilateral ureteral strictures and bilateral hydronephrosis R>L s/p percutaneous nephrostomy tubes.  Patient presented to [facility] on 9/11 for scheduled s/p open transverse colon conduit urinary diversion on 9/11/2020 with take back to OR 9/17 with colo-colic anastomosis intact, bowel perforation found, underwent resection of right colon, remaining transverses colon, and proximal descending colon, ileostomy creation, Charlotte's pouch created.  Hospital course further complicated by anemia, moderate malnutrition (on TPN), and pain (PCA pump infusing).     Functional History: Patient lives with spouse and daughter in a single  story home with 3 steps to enter.  Prior to admission, (I) with ADLs and mobility. DME: RW.     Hospital Course: 9/21: BM ModA-CGA. Sit to stand ModA x 2 ppl & RW. Ambulated 20 ft CGA & RW. UBD Jalil. LBD MaxA.   09/22/2020: Declined OT.     Past Medical History:   Diagnosis Date    Abnormal mammogram 8/25/2020    Anxiety     Cervical cancer 2014    Chronic back pain     Depression     Diarrhea due to malabsorption 11/14/2018    Fibromyalgia     Generalized abdominal pain 8/25/2020    GERD (gastroesophageal reflux disease)     Hiatal hernia 2014    History of cervical cancer 10/11/2018    History of cervical cancer 10/11/2018    Hx of psychiatric care     Cymbalta,  trazodone    Hypertension     Hypomagnesemia 11/21/2018    Lactose intolerance     Metastatic squamous cell carcinoma to lymph node 10/11/2018    Nephrostomy tube displaced     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 drug 11/14/2018    Osteoarthritis of back     Psychiatric problem     Stroke 1972     Past Surgical History:   Procedure Laterality Date    BILATERAL OOPHORECTOMY  2015    CHOLECYSTECTOMY  11/09/2016    Done at [facility], path showed chronic cholecystitis and gallstones    cold knife conization  2014    COLECTOMY, RIGHT  9/17/2020    Procedure: COLECTOMY, RIGHT Extended;  Surgeon: Hammad Reynolds MD;  Location: [facility] OR 2ND FLR;  Service: Colon and Rectal;;    COLONOSCOPY  2014    COLONOSCOPY N/A 10/24/2016    at [facility], Dr Gutiérrez    COLONOSCOPY N/A 5/18/2018    Procedure: COLONOSCOPY;  Surgeon: Arden Gutiérrez MD;  Location: [facility] ENDO (4TH FLR);  Service: Endoscopy;  Laterality: N/A;    COLONOSCOPY N/A 7/28/2020    Procedure: COLONOSCOPY;  Surgeon: Hammad Reynolds MD;  Location: [facility] ENDO (4TH FLR);  Service: Colon and Rectal;  Laterality: N/A;  covid test [place] 7/25    CYSTOSCOPY WITH URETEROSCOPY, RETROGRADE PYELOGRAPHY, AND INSERTION OF STENT Bilateral 3/21/2020    Procedure: CYSTOSCOPY, WITH RETROGRADE PYELOGRAM,;  Surgeon: Leslie Balbuena MD;  Location: [facility] OR 1ST FLR;  Service: Urology;  Laterality: Bilateral;    ESOPHAGOGASTRODUODENOSCOPY  2014    HYSTERECTOMY  2014    [facility] for cervical cancer    ILEOSTOMY  9/17/2020    Procedure: CREATION, ILEOSTOMY;  Surgeon: Hammad Reynolds MD;  Location: [facility] OR 2ND FLR;  Service: Colon and Rectal;;    MOBILIZATION OF SPLENIC FLEXURE N/A 9/11/2020    Procedure: MOBILIZATION, COLONIC;  Surgeon: Hammad Reynolds MD;  Location: [facility] OR 2ND FLR;  Service: Colon and Rectal;  Laterality: N/A;    OOPHORECTOMY      RETROPERITONEAL LYMPHADENECTOMY Right 9/17/2018    Procedure: LYMPHADENECTOMY, RETROPERITONEUM;  Surgeon: Sebas Reed MD;   Location: [facility] OR [address] [place] [address];  Service: General;  Laterality: Right;  ILIAC     Review of patient's allergies indicates:   Allergen Reactions    Bee sting [allergen ext-venom-honey bee]      Rash      Grass pollen-bermuda, standard      rash       Scheduled Medications:    cyanocobalamin  100 mcg Intramuscular Daily    [START ON 9/26/2020] cyanocobalamin  100 mcg Intramuscular Weekly    epoetin yecenia-epbx  20,000 Units Subcutaneous Q48H    fat emulsion 20%  250 mL Intravenous Daily    fat emulsion 20%  250 mL Intravenous Daily    pantoprazole  40 mg Intravenous Daily    piperacillin-tazobactam (ZOSYN) IVPB  4.5 g Intravenous Q8H    potassium chloride in water  10 mEq Intravenous Q1H    sodium chloride 0.9%  10 mL Intravenous Q6H       PRN Medications: dextrose 50%, glucagon (human recombinant), insulin aspart U-100, iohexol, iohexol, labetalol, naloxone, ondansetron, sodium chloride 0.9%, Flushing PICC Protocol **AND** sodium chloride 0.9% **AND** sodium chloride 0.9%, sodium chloride 0.9%    Family History     Problem Relation (Age of Onset)    Breast cancer Maternal Aunt    Cancer Father, Mother    Cervical cancer Mother    Colon cancer Father    Drug abuse Daughter, Daughter    Esophageal cancer Father    Heart disease Sister, Maternal Grandmother    Suicide Daughter        Tobacco Use    Smoking status: Former Smoker    Smokeless tobacco: Never Used   Substance and Sexual Activity    Alcohol use: No     Alcohol/week: 0.0 standard drinks    Drug use: No    Sexual activity: Yes     Partners: Male     Birth control/protection: None     Comment:  19 years since 1999     Review of Systems   Constitutional: Positive for activity change. Negative for fatigue and fever.   HENT: Negative for trouble swallowing and voice change.    Eyes: Negative for photophobia and visual disturbance.   Respiratory: Negative for cough and shortness of breath.    Cardiovascular: Negative for chest pain and palpitations.    Gastrointestinal: Negative for nausea and vomiting.   Genitourinary: Negative for difficulty urinating and flank pain.   Musculoskeletal: Positive for gait problem. Negative for arthralgias.   Skin: Negative for color change and rash.   Neurological: Positive for weakness. Negative for speech difficulty and numbness.   Psychiatric/Behavioral: Negative for agitation and confusion.     Objective:     Vital Signs (Most Recent):  Temp: 97.9 °F (36.6 °C) (09/23/20 0949)  Pulse: (!) 114 (09/23/20 1026)  Resp: 19 (09/23/20 1026)  BP: (!) 172/98 (09/23/20 1026)  SpO2: 98 % (09/23/20 1026)    Vital Signs (24h Range):  Temp:  [97.9 °F (36.6 °C)-100 °F (37.8 °C)] 97.9 °F (36.6 °C)  Pulse:  [] 114  Resp:  [18-31] 19  SpO2:  [96 %-100 %] 98 %  BP: (145-173)/(70-98) 172/98     Body mass index is 28.94 kg/m².    Physical Exam  Vitals signs reviewed.   Constitutional:       General: She is not in acute distress.     Appearance: She is well-developed.   HENT:      Head: Normocephalic and atraumatic.   Eyes:      General:         Right eye: No discharge.         Left eye: No discharge.   Neck:      Musculoskeletal: Neck supple.   Cardiovascular:      Rate and Rhythm: Normal rate.   Pulmonary:      Effort: Pulmonary effort is normal. No respiratory distress.      Comments: NC in place   Abdominal:      Palpations: Abdomen is soft.      Tenderness: There is no abdominal tenderness.      Comments: Ileostomy in place   TPN infusing   Wound vac and drain in place  Genitourinary:     Comments: Nephrostomy tube in place  Urostomy in place  Musculoskeletal:         General: No deformity.   Skin:     General: Skin is warm and dry.   Neurological:      Mental Status: She is alert and oriented to person, place, and time.      Motor: Weakness present.      Comments: Follows commands    Psychiatric:         Behavior: Behavior normal.         Cognition and Memory: Cognition is not impaired.     Diagnostic Results:   Labs: Reviewed  X-Ray:  Reviewed  CT: Reviewed    Assessment/Plan:     * Bilateral ureteral obstruction  -s/p open transverse colon conduit urinary diversion on 9/11/2020 with take back to OR 9/17 for extended R colectomy and ileostomy creation 2/2 peritonitis  -TPN infusing  -PCA pump infusing   -Wound vac and drain in place    Impaired functional mobility, balance, gait, and endurance  See hospital course for functional status.      Recommendations  -  Encourage mobility, OOB in chair, and early ambulation as appropriate  -  PT/OT evaluate and treat  -  Pain management  -  DVT prophylaxis if appropriate   -  Monitor for and prevent skin breakdown and pressure ulcers  · Early mobility, repositioning/weight shifting every 20-30 minutes when sitting, turn patient every 2 hours, proper mattress/overlay and chair cushioning, pressure relief/heel protector boots    Participating with therapy. Will follow progress and discuss with PM&R staff for post acute care recommendation.      Thank you for your consult.     Rylee Knapp NP  Department of Physical Medicine & Rehab  [facility]

## 2020-09-23 NOTE — PROCEDURES
"  Pre Op Diagnosis: abdominal fluid collection  Post Op Diagnosis: Same    Procedure: Drain placement    Procedure performed by: Silva    Written Informed Consent Obtained: Yes  Specimen Removed: YES 50 cc  Estimated Blood Loss: Minimal    Findings:   Successful placment of 10 Fr drain in RLQ fluid collection    Patient tolerated procedure well.    Refugio Ascencio MD ([place])  Interventional Radiology  [phone]        "

## 2020-09-23 NOTE — PLAN OF CARE
09/23/20 0949   Post-Acute Status   Post-Acute Authorization Placement   Post-Acute Placement Status Referrals Sent     SW faxed additional referrals to [facility] and [facility] via  for review. SW will follow up as needed.    Kira Dawkins LMSW  Case Management   [facility]   Ext. [phone]

## 2020-09-23 NOTE — SUBJECTIVE & OBJECTIVE
Past Medical History:   Diagnosis Date    Abnormal mammogram 8/25/2020    Anxiety     Cervical cancer 2014    Chronic back pain     Depression     Diarrhea due to malabsorption 11/14/2018    Fibromyalgia     Generalized abdominal pain 8/25/2020    GERD (gastroesophageal reflux disease)     Hiatal hernia 2014    History of cervical cancer 10/11/2018    History of cervical cancer 10/11/2018    Hx of psychiatric care     Cymbalta, trazodone    Hypertension     Hypomagnesemia 11/21/2018    Lactose intolerance     Metastatic squamous cell carcinoma to lymph node 10/11/2018    Nephrostomy tube displaced     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 drug 11/14/2018    Osteoarthritis of back     Psychiatric problem     Stroke 1972     Past Surgical History:   Procedure Laterality Date    BILATERAL OOPHORECTOMY  2015    CHOLECYSTECTOMY  11/09/2016    Done at [facility], path showed chronic cholecystitis and gallstones    cold knife conization  2014    COLECTOMY, RIGHT  9/17/2020    Procedure: COLECTOMY, RIGHT Extended;  Surgeon: Hammad Reynolds MD;  [facility];  Service: Colon and Rectal;;    COLONOSCOPY  2014    COLONOSCOPY N/A 10/24/2016    at [facility], Dr Gutiérrez    COLONOSCOPY N/A 5/18/2018    Procedure: COLONOSCOPY;  Surgeon: Arden Gutiérrez MD;  Location: [facility] (4TH FLR);  Service: Endoscopy;  Laterality: N/A;    COLONOSCOPY N/A 7/28/2020    Procedure: COLONOSCOPY;  Surgeon: Hammad Reynolds MD;  Location: [facility] (4TH FLR);  Service: Colon and Rectal;  Laterality: N/A;  covid test [place] 7/25    CYSTOSCOPY WITH URETEROSCOPY, RETROGRADE PYELOGRAPHY, AND INSERTION OF STENT Bilateral 3/21/2020    Procedure: CYSTOSCOPY, WITH RETROGRADE PYELOGRAM,;  Surgeon: Leslie Balbuena MD;  Location: [facility] OR [facility] FLR;  Service: Urology;  Laterality: Bilateral;    ESOPHAGOGASTRODUODENOSCOPY  2014    HYSTERECTOMY  2014    [facility] for cervical cancer    ILEOSTOMY  9/17/2020    Procedure: CREATION,  ILEOSTOMY;  Surgeon: Hammad Reynolds MD;  Location: [facility] OR 2ND FLR;  Service: Colon and Rectal;;    MOBILIZATION OF SPLENIC FLEXURE N/A 9/11/2020    Procedure: MOBILIZATION, COLONIC;  Surgeon: Hammad Reynolds MD;  Location: [facility] OR 2ND FLR;  Service: Colon and Rectal;  Laterality: N/A;    OOPHORECTOMY      RETROPERITONEAL LYMPHADENECTOMY Right 9/17/2018    Procedure: LYMPHADENECTOMY, RETROPERITONEUM;  Surgeon: Sebas Reed MD;  Location: [facility] OR 2ND FLR;  Service: General;  Laterality: Right;  ILIAC     Review of patient's allergies indicates:   Allergen Reactions    Bee sting [allergen ext-venom-honey bee]      Rash      Grass pollen-bermuda, standard      rash       Scheduled Medications:    cyanocobalamin  100 mcg Intramuscular Daily    [START ON 9/26/2020] cyanocobalamin  100 mcg Intramuscular Weekly    epoetin yecenia-epbx  20,000 Units Subcutaneous Q48H    fat emulsion 20%  250 mL Intravenous Daily    fat emulsion 20%  250 mL Intravenous Daily    pantoprazole  40 mg Intravenous Daily    piperacillin-tazobactam (ZOSYN) IVPB  4.5 g Intravenous Q8H    potassium chloride in water  10 mEq Intravenous Q1H    sodium chloride 0.9%  10 mL Intravenous Q6H       PRN Medications: dextrose 50%, glucagon (human recombinant), insulin aspart U-100, iohexol, iohexol, labetalol, naloxone, ondansetron, sodium chloride 0.9%, Flushing PICC Protocol **AND** sodium chloride 0.9% **AND** sodium chloride 0.9%, sodium chloride 0.9%    Family History     Problem Relation (Age of Onset)    Breast cancer Maternal Aunt    Cancer Father, Mother    Cervical cancer Mother    Colon cancer Father    Drug abuse Daughter, Daughter    Esophageal cancer Father    Heart disease Sister, Maternal Grandmother    Suicide Daughter        Tobacco Use    Smoking status: Former Smoker    Smokeless tobacco: Never Used   Substance and Sexual Activity    Alcohol use: No     Alcohol/week: 0.0 standard drinks    Drug use: No    Sexual  activity: Yes     Partners: Male     Birth control/protection: None     Comment:  19 years since 1999     Review of Systems   Constitutional: Positive for activity change. Negative for fatigue and fever.   HENT: Negative for trouble swallowing and voice change.    Eyes: Negative for photophobia and visual disturbance.   Respiratory: Negative for cough and shortness of breath.    Cardiovascular: Negative for chest pain and palpitations.   Gastrointestinal: Negative for nausea and vomiting.   Genitourinary: Negative for difficulty urinating and flank pain.   Musculoskeletal: Positive for gait problem. Negative for arthralgias.   Skin: Negative for color change and rash.   Neurological: Positive for weakness. Negative for speech difficulty and numbness.   Psychiatric/Behavioral: Negative for agitation and confusion.     Objective:     Vital Signs (Most Recent):  Temp: 97.9 °F (36.6 °C) (09/23/20 0949)  Pulse: (!) 114 (09/23/20 1026)  Resp: 19 (09/23/20 1026)  BP: (!) 172/98 (09/23/20 1026)  SpO2: 98 % (09/23/20 1026)    Vital Signs (24h Range):  Temp:  [97.9 °F (36.6 °C)-100 °F (37.8 °C)] 97.9 °F (36.6 °C)  Pulse:  [] 114  Resp:  [18-31] 19  SpO2:  [96 %-100 %] 98 %  BP: (145-173)/(70-98) 172/98     Body mass index is 28.94 kg/m².    Physical Exam  Vitals signs reviewed.   Constitutional:       General: She is not in acute distress.     Appearance: She is well-developed.   HENT:      Head: Normocephalic and atraumatic.   Eyes:      General:         Right eye: No discharge.         Left eye: No discharge.   Neck:      Musculoskeletal: Neck supple.   Cardiovascular:      Rate and Rhythm: Normal rate.   Pulmonary:      Effort: Pulmonary effort is normal. No respiratory distress.      Comments: NC in place   Abdominal:      Palpations: Abdomen is soft.      Tenderness: There is no abdominal tenderness.      Comments: Ileostomy in place   TPN infusing   Genitourinary:     Comments: Nephrostomy tube in  place  Musculoskeletal:         General: No deformity.   Skin:     General: Skin is warm and dry.   Neurological:      Mental Status: She is alert and oriented to person, place, and time.      Motor: Weakness present.      Comments: Follows commands    Psychiatric:         Behavior: Behavior normal.         Cognition and Memory: Cognition is not impaired.       NEUROLOGICAL EXAMINATION:     MENTAL STATUS   Oriented to person, place, and time.       Diagnostic Results:   Labs: Reviewed  X-Ray: Reviewed  CT: Reviewed

## 2020-09-23 NOTE — PROGRESS NOTES
[facility]  Colorectal Surgery  Progress Note    Patient Name: Edita Riley  MRN: [id number]  Admission Date: 9/11/2020  Hospital Length of Stay: 12 days  Attending Physician: Hammad Reynolds MD    Subjective:     Interval History: no acute events overnite, nausea better, for IR today to drain fluid collection    Post-Op Info:  Procedure(s):  COLECTOMY, RIGHT Extended  CREATION, ILEOSTOMY   6 Days Post-Op      Medications:  Continuous Infusions:   hydromorphone in 0.9 % NaCl 6 mg/30 ml      TPN ADULT CENTRAL LINE CUSTOM 60 mL/hr at 09/22/20 2334    TPN ADULT CENTRAL LINE CUSTOM       Scheduled Meds:   cyanocobalamin  100 mcg Intramuscular Daily    [START ON 9/26/2020] cyanocobalamin  100 mcg Intramuscular Weekly    epoetin yecenia-epbx  20,000 Units Subcutaneous Q48H    fat emulsion 20%  250 mL Intravenous Daily    pantoprazole  40 mg Intravenous Daily    piperacillin-tazobactam (ZOSYN) IVPB  4.5 g Intravenous Q8H    potassium chloride in water  10 mEq Intravenous Q1H    sodium chloride 0.9%  10 mL Intravenous Q6H     PRN Meds:   dextrose 50%    glucagon (human recombinant)    insulin aspart U-100    iohexol    iohexol    labetalol    naloxone    ondansetron    sodium chloride 0.9%    sodium chloride 0.9%    sodium chloride 0.9%        Objective:     Vital Signs (Most Recent):  Temp: 98.7 °F (37.1 °C) (09/23/20 1200)  Pulse: 103 (09/23/20 1200)  Resp: 18 (09/23/20 1200)  BP: 139/68 (09/23/20 1200)  SpO2: 96 % (09/23/20 1200) Vital Signs (24h Range):  Temp:  [97.9 °F (36.6 °C)-100 °F (37.8 °C)] 98.7 °F (37.1 °C)  Pulse:  [] 103  Resp:  [18-31] 18  SpO2:  [96 %-100 %] 96 %  BP: (139-172)/(68-98) 139/68     Intake/Output - Last 3 Shifts       09/21 0700 - 09/22 0659 09/22 0700 - 09/23 0659 09/23 0700 - 09/24 0659    P.O.   0    Other  0     Total Intake(mL/kg)  0 (0) 0 (0)    Urine (mL/kg/hr) 950 (0.4) 1375 (0.6) 350 (0.7)    Emesis/NG output 0 100 0    Drains   100     Stool 525 925 175    Total Output 4405 1355 625    Net -1475 -2400 -625           Emesis Occurrence 1 x  1 x          Physical Exam  Vitals signs and nursing note reviewed.   Constitutional:       Appearance: She is well-developed. She is not ill-appearing.   HENT:      Head: Normocephalic.   Eyes:      Pupils: Pupils are equal, round, and reactive to light.   Cardiovascular:      Rate and Rhythm: Normal rate and regular rhythm.      Heart sounds: Normal heart sounds.   Pulmonary:      Effort: Pulmonary effort is normal. No respiratory distress.      Breath sounds: Normal breath sounds. No wheezing or rales.   Abdominal:      Palpations: Abdomen is soft. There is no mass.      Tenderness: There is no guarding or rebound.      Comments: abd inc line healing  Ileostomy and urostomy functional   Skin:     General: Skin is warm and dry.   Neurological:      Mental Status: She is alert and oriented to person, place, and time.   Psychiatric:         Behavior: Behavior normal.         Thought Content: Thought content normal.         Judgment: Judgment normal.           Significant Labs:  BMP (Last 3 Results):   Recent Labs   Lab 09/19/20  0408 09/20/20  0201 09/21/20  0422 09/22/20  0739 09/23/20  0554   * 136* 142* 201* 203*   * 152* 147* 147* 142   K 3.6 3.3* 3.4* 2.5* 3.1*   * 115* 111* 109 108   CO2 24 28 26 26 23   BUN 24* 25* 21* 13 14   CREATININE 1.0 0.8 0.8 0.8 0.9   CALCIUM 8.2* 8.5* 8.3* 8.1* 7.8*   MG 2.4 2.4 1.8  --   --      CBC (Last 3 Results):   Recent Labs   Lab 09/21/20  0422 09/22/20  0739 09/23/20  0554   WBC 17.78* 24.21* 23.17*   RBC 2.45* 2.63* 2.51*   HGB 6.4* 7.0* 6.7*   HCT 22.4* 23.5* 22.9*   * 471* 486*   MCV 91 89 91   MCH 26.1* 26.6* 26.7*   MCHC 28.6* 29.8* 29.3*       Significant Diagnostics:  IR today    Assessment/Plan:     * Bilateral ureteral obstruction  Ms. Riley is a 55 y.o. female with b/l ureteral obstruction s/p transverse colon conduit on  9/11/2020. Extended R colectomy and ileostomy creation on 9/17.     - Diet NPO, sips ok  - Continue TPN  - D/c daily cbc  - Zosyn  - for IR for new fluid collection  - OOB, activity as tolerated  - PT/OT  -discharge planning, will need LTC      Acute blood loss anemia  Chronic anemia due to chronic dx and acute blood loss anemia r/t surgery  Monitor labs  No blood due to religiouos belief  Iron IV     Peritonitis  The patient is a 55-year-old female who is now 1 week out from a urinary diversion by Dr Balbuena due to bladder outlet obstruction from prior pelvic radiation therapy.  At time of that procedure, I procured a vascularized segment of transverse colon to be used as the urinary conduit, as requested by Urology.  A primary end-to-end, hand-sewn colocolonic anastomosis was performed at that time to restore intestinal continuity once the segment of colon to be used for the conduit had been excluded.  The patient did well for the 1st few days postoperatively but then began to develop abdominal pain and distention consistent with an ileus.  This morning, there was small amount of pneumoperitoneum seen on a plain abdominal film, and later today she began to drain feculent appearing fluid through her Kurtis-Arias drain.  She had a normal white blood cell count and did not have peritonitis on examination, so a CT scan was performed, which demonstrated a large volume of free abdominal fluid as well as a large volume of pneumoperitoneum, concerning for an anastomotic leak.  She is being brought back to the operating room for urgent exploratory laparotomy.    OR 9/17/2020    Cont TPN  Consult wound care for ileosotmy  Naseem vaughan, scd  Enc amb and IS  Some emesis this am, distention and leucocytosis- CT 9/22           Moderate malnutrition  Consult dietary  TPN  Monitor labs    Severe episode of recurrent major depressive disorder, with psychotic features  Cont home m eds    Impaired functional mobility, balance, gait, and  endurance  PT/OT    Essential hypertension  Chronic, controlled.  Latest blood pressure and vitals reviewed-   Temp:  [97.9 °F (36.6 °C)-100 °F (37.8 °C)]   Pulse:  []   Resp:  [18-31]   BP: (139-172)/(68-98)   SpO2:  [96 %-100 %] .   Home meds for hypertension were reviewed and noted below. Hospital anti-hypertensive changes were made as shown below.  Hospital Medications             labetalol 20 mg/4 mL (5 mg/mL) IV syring 10 mg, Intravenous, Every 4 hours PRN, 1. Confirm patient on continuous cardiac monitor (obtain order if needed)<BR>2. Obstetrics is exempt from continuous cardiac monitoring. <BR>3. Monitor ECG rhythm throughout administrations noting rhythm and changes<BR>4. Monitor BP and HR pre-administration, post-administration, and every 30 minutes x1 after dose given<BR>5. Administer Labetalol at rate no faster than 10mg over 1 minute.<BR>6. Contraindications: HR &lt;50, SBP&lt;100, second or third degree AV block. If assessed, hold dose and call provider.        Will utilize p.r.n. blood pressure medication only if patient's blood pressure greater than  180/110 and she develops symptoms such as worsening chest pain or shortness of breath.          Kerri Castillo NP  Colorectal Surgery  [facility]

## 2020-09-23 NOTE — ASSESSMENT & PLAN NOTE
Chronic, controlled.  Latest blood pressure and vitals reviewed-   Temp:  [97.9 °F (36.6 °C)-100 °F (37.8 °C)]   Pulse:  []   Resp:  [18-31]   BP: (139-172)/(68-98)   SpO2:  [96 %-100 %] .   Home meds for hypertension were reviewed and noted below. Hospital anti-hypertensive changes were made as shown below.  Hospital Medications             labetalol 20 mg/4 mL (5 mg/mL) IV syring 10 mg, Intravenous, Every 4 hours PRN, 1. Confirm patient on continuous cardiac monitor (obtain order if needed)<BR>2. Obstetrics is exempt from continuous cardiac monitoring. <BR>3. Monitor ECG rhythm throughout administrations noting rhythm and changes<BR>4. Monitor BP and HR pre-administration, post-administration, and every 30 minutes x1 after dose given<BR>5. Administer Labetalol at rate no faster than 10mg over 1 minute.<BR>6. Contraindications: HR &lt;50, SBP&lt;100, second or third degree AV block. If assessed, hold dose and call provider.        Will utilize p.r.n. blood pressure medication only if patient's blood pressure greater than  180/110 and she develops symptoms such as worsening chest pain or shortness of breath.

## 2020-09-23 NOTE — PT/OT/SLP PROGRESS
"Occupational Therapy   Co-Treatment    Name: Edita Riley  MRN: [id number]  Admitting Diagnosis:  Bilateral ureteral obstruction  6 Days Post-Op   Procedure(s):  COLECTOMY, RIGHT Extended  CREATION, ILEOSTOMY     Recommendations:     Discharge Recommendations: nursing facility, skilled  Discharge Equipment Recommendations:  bath bench, walker, rolling, wheelchair  Barriers to discharge:  (Requires increased skilled assistance)    Assessment:     Edita Riley is a 55 y.o. female with a medical diagnosis of Bilateral ureteral obstruction.  She presents with good effort this day with noted improvement in functional mobility. Patient is motivated to return to [facility]. Performance deficits affecting function are weakness, impaired endurance, impaired self care skills, impaired functional mobilty, gait instability, impaired balance, pain, impaired skin.     Rehab Prognosis:  Good; patient would benefit from acute skilled OT services to address these deficits and reach maximum level of function.       Plan:     Patient to be seen 4 x/week to address the above listed problems via self-care/home management, therapeutic activities, therapeutic exercises  · Plan of Care Expires: 10/11/20  · Plan of Care Reviewed with: patient    Subjective     "I need to walk so I can get out of this hospital."    Pain/Comfort:  · Pain Rating 1: 7/10  · Location 1: abdomen  · Pain Addressed 1: Pre-medicate for activity, Reposition, Cessation of Activity  · Pain Rating Post-Intervention 1: 7/10    Objective:     Communicated with: RN prior to session.  Patient found up in chair with colostomy, telemetry, nephrostomy, PCA, peripheral IV upon OT entry to room.    General Precautions: Standard, fall   Orthopedic Precautions:N/A   Braces: N/A     Occupational Performance:     Bed Mobility:    · Did not observe    Functional Mobility/Transfers:  · Patient completed Sit <> Stand Transfer with contact guard assistance with rolling " walker   · Functional Mobility: 35', 50', 70' with CGA and RW, seated rest breaks between with chair follow for safety    Activities of Daily Living:  · Grooming: supervision Patient participated in oral care and face wash with a washcloth while sitting up in the chair with SUP.    [facility] 6 Click ADL: 18    Treatment & Education:  Role of OT/POC  Call button for assistance    Patient left up in chair with all lines intact, call button in reach and RN notifiedEducation:      GOALS:   Multidisciplinary Problems     Occupational Therapy Goals        Problem: Occupational Therapy Goal    Goal Priority Disciplines Outcome Interventions   Occupational Therapy Goal     OT, PT/OT Ongoing, Progressing    Description: Goals to be met by: 9/26/2020    Patient will increase functional independence with ADLs by performing:    LE Dressing with Supervision.  Grooming while standing with Supervision.  Toileting from toilet with Supervision for hygiene and clothing management.   Supine to sit with Supervision.  Toilet transfer to toilet with Supervision.                     Time Tracking:     OT Date of Treatment: 09/23/20  OT Start Time: 1007  OT Stop Time: 1039  OT Total Time (min): 32 min    Billable Minutes:Self Care/Home Management 12 minutes  Therapeutic Activity 20 minutes    Ann Raphael OT  9/23/2020

## 2020-09-23 NOTE — HOSPITAL COURSE
9/21: BM ModA-CGA. Sit to stand ModA x 2 ppl & RW. Ambulated 20 ft CGA & RW. UBD Jalil. LBD MaxA.   09/22/2020: Declined OT.   9/23: OT-Sit to stand CGA & RW. Ambulated 35', 50', 70' with CGA and RW, seated rest breaks between with chair follow for safety. [place] SV.   9/26: OT-Sit to stand CGA-SBA 7 RW. Trxs CGA & RW. Ambulated 15 ft + 20 ft + 35 ft + 35 ft CGA-SBA & RW.   9/30:  Sit to stand SBA. Feed (I). [place] SBA. Bathing Jalil. UBD ModA.   10/1: BM Jalil. Sit to stand CGA & RW. UBD Jalil. LBD ModA. Gait: Patient ambulated 100 ft and 50 ft with rolling walker and contact guard assistance.  Patient required ~ 8 min seated rest between gait trials.

## 2020-09-23 NOTE — PROGRESS NOTES
Ostomy care follow up:      Fifth lesson completed today. Met with patient and reviewed goals of education to include stoma and peristomal care, obtaining supplies, pouch emptying for ileostomy and urosotomy. Reviewed food choices, hydration and pouch changing process for ileosotomy and urostomy. Patient reports that she feels that she will be able to change the pouch herself when she is discharged from the hospital. Ordered additional supplies to be sent bedside. Nursing and MD team notified regarding today's training. Wound Ostomy to continue to follow pt PRN [id number]

## 2020-09-23 NOTE — ASSESSMENT & PLAN NOTE
The patient is a 55-year-old female who is now 1 week out from a urinary diversion by Dr Balbuena due to bladder outlet obstruction from prior pelvic radiation therapy.  At time of that procedure, I procured a vascularized segment of transverse colon to be used as the urinary conduit, as requested by Urology.  A primary end-to-end, hand-sewn colocolonic anastomosis was performed at that time to restore intestinal continuity once the segment of colon to be used for the conduit had been excluded.  The patient did well for the 1st few days postoperatively but then began to develop abdominal pain and distention consistent with an ileus.  This morning, there was small amount of pneumoperitoneum seen on a plain abdominal film, and later today she began to drain feculent appearing fluid through her Kurtis-Arias drain.  She had a normal white blood cell count and did not have peritonitis on examination, so a CT scan was performed, which demonstrated a large volume of free abdominal fluid as well as a large volume of pneumoperitoneum, concerning for an anastomotic leak.  She is being brought back to the operating room for urgent exploratory laparotomy.    OR 9/17/2020    Cont TPN  Consult wound care for ileosotmy  Naseem vaughan, scd  Enc amb and IS  Some emesis this am, distention and leucocytosis- CT 9/22

## 2020-09-23 NOTE — ASSESSMENT & PLAN NOTE
-s/p open transverse colon conduit urinary diversion on 9/11/2020 with take back to OR 9/17 for extended R colectomy and ileostomy creation 2/2 peritonitis  -TPN infusing  -PCA pump infusing

## 2020-09-23 NOTE — PLAN OF CARE
09/23/20 0809   Discharge Reassessment   Assessment Type Discharge Planning Reassessment   Provided patient/caregiver education on the expected discharge date and the discharge plan Yes   Do you have any problems affording any of your prescribed medications? No   Discharge Plan A Skilled Nursing Facility   Discharge Plan B Rehab   DME Needed Upon Discharge  none   Anticipated Discharge Disposition SNF   Post-Acute Status   Post-Acute Authorization Placement   Post-Acute Placement Status Awaiting Internal Medical Clearance

## 2020-09-23 NOTE — ASSESSMENT & PLAN NOTE
Ms. Riley is a 55 y.o. female with b/l ureteral obstruction s/p transverse colon conduit on 9/11/2020. Extended R colectomy and ileostomy creation on 9/17.     - Diet NPO, sips ok  - Continue TPN  - D/c daily cbc  - Zosyn  - for IR for new fluid collection  - OOB, activity as tolerated  - PT/OT  -discharge planning, will need LTC

## 2020-09-23 NOTE — RESPIRATORY THERAPY
Rapid Response Respiratory Therapy ETCO2 Check     Time of visit: 1442     Code Status: Full Code   : 1965  Bed: 524/524 A:   MRN: [id number]    SITUATION     Evaluated patient for: ETCO2 Compliance     BACKGROUND     Patient has a past medical history of Abnormal mammogram, Anxiety, Cervical cancer, Chronic back pain, Depression, Diarrhea due to malabsorption, Fibromyalgia, Generalized abdominal pain, GERD (gastroesophageal reflux disease), Hiatal hernia, History of cervical cancer, History of cervical cancer, psychiatric care, Hypertension, Hypomagnesemia, Lactose intolerance, Metastatic squamous cell carcinoma to lymph node, Nephrostomy tube displaced, 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 drug, Osteoarthritis of back, Psychiatric problem, and Stroke.    ASSESSMENT     Is the ETCO2 monitor on? (Yes/No)  yes   Is the patient wearing a cannula? (Yes/No) yes  Are ETCO2 orders placed? (Yes/No) yes  Is the patient on a PCA pump? (Yes/No) yes  ETCO2 monitored: ETCO2 (mmHg): 35 mmHg  O2 Device/Concentration: RA  Pulse:  Respiratory rate:  Temperature: Temp: 97 °F (36.1 °C) BP: BP: 139/65 SpO2:   Level of Consciousness: Level of Consciousness (AVPU): alert  All Lung Field Breath Sounds: All Lung Fields Breath Sounds: diminished, Lateral:, Posterior:, Anterior:  MARYANN Breath Sounds: diminished  LLL Breath Sounds: diminished  RUL Breath Sounds: diminished  RML Breath Sounds: diminished  RLL Breath Sounds: diminished  Ambu at bedside: Ambu bag with the patient?: Yes, Adult Ambu    INTERVENTIONS/RECOMMENDATIONS     Continue POC.    PHYSICIAN ESCALATION     Physician Escalation (Yes/No): no         FOLLOW-UP     Please call back the Rapid Response RT, Yudelka Holder, RRT at x [phone] for any questions or concerns.

## 2020-09-23 NOTE — SUBJECTIVE & OBJECTIVE
Subjective:     Interval History: no acute events overnite, nausea better, for IR today to drain fluid collection    Post-Op Info:  Procedure(s):  COLECTOMY, RIGHT Extended  CREATION, ILEOSTOMY   6 Days Post-Op      Medications:  Continuous Infusions:   hydromorphone in 0.9 % NaCl 6 mg/30 ml      TPN ADULT CENTRAL LINE CUSTOM 60 mL/hr at 09/22/20 2334    TPN ADULT CENTRAL LINE CUSTOM       Scheduled Meds:   cyanocobalamin  100 mcg Intramuscular Daily    [START ON 9/26/2020] cyanocobalamin  100 mcg Intramuscular Weekly    epoetin yecenia-epbx  20,000 Units Subcutaneous Q48H    fat emulsion 20%  250 mL Intravenous Daily    pantoprazole  40 mg Intravenous Daily    piperacillin-tazobactam (ZOSYN) IVPB  4.5 g Intravenous Q8H    potassium chloride in water  10 mEq Intravenous Q1H    sodium chloride 0.9%  10 mL Intravenous Q6H     PRN Meds:   dextrose 50%    glucagon (human recombinant)    insulin aspart U-100    iohexol    iohexol    labetalol    naloxone    ondansetron    sodium chloride 0.9%    sodium chloride 0.9%    sodium chloride 0.9%        Objective:     Vital Signs (Most Recent):  Temp: 98.7 °F (37.1 °C) (09/23/20 1200)  Pulse: 103 (09/23/20 1200)  Resp: 18 (09/23/20 1200)  BP: 139/68 (09/23/20 1200)  SpO2: 96 % (09/23/20 1200) Vital Signs (24h Range):  Temp:  [97.9 °F (36.6 °C)-100 °F (37.8 °C)] 98.7 °F (37.1 °C)  Pulse:  [] 103  Resp:  [18-31] 18  SpO2:  [96 %-100 %] 96 %  BP: (139-172)/(68-98) 139/68     Intake/Output - Last 3 Shifts       09/21 0700 - 09/22 0659 09/22 0700 - 09/23 0659 09/23 0700 - 09/24 0659    P.O.   0    Other  0     Total Intake(mL/kg)  0 (0) 0 (0)    Urine (mL/kg/hr) 950 (0.4) 1375 (0.6) 350 (0.7)    Emesis/NG output 0 100 0    Drains   100    Stool 525 925 175    Total Output 6505 3360 221    Net -1475 -0537 -625           Emesis Occurrence 1 x  1 x          Physical Exam  Vitals signs and nursing note reviewed.   Constitutional:       Appearance: She is  well-developed. She is not ill-appearing.   HENT:      Head: Normocephalic.   Eyes:      Pupils: Pupils are equal, round, and reactive to light.   Cardiovascular:      Rate and Rhythm: Normal rate and regular rhythm.      Heart sounds: Normal heart sounds.   Pulmonary:      Effort: Pulmonary effort is normal. No respiratory distress.      Breath sounds: Normal breath sounds. No wheezing or rales.   Abdominal:      Palpations: Abdomen is soft. There is no mass.      Tenderness: There is no guarding or rebound.      Comments: abd inc line healing  Ileostomy and urostomy functional   Skin:     General: Skin is warm and dry.   Neurological:      Mental Status: She is alert and oriented to person, place, and time.   Psychiatric:         Behavior: Behavior normal.         Thought Content: Thought content normal.         Judgment: Judgment normal.           Significant Labs:  BMP (Last 3 Results):   Recent Labs   Lab 09/19/20  0408 09/20/20  0201 09/21/20  0422 09/22/20  0739 09/23/20  0554   * 136* 142* 201* 203*   * 152* 147* 147* 142   K 3.6 3.3* 3.4* 2.5* 3.1*   * 115* 111* 109 108   CO2 24 28 26 26 23   BUN 24* 25* 21* 13 14   CREATININE 1.0 0.8 0.8 0.8 0.9   CALCIUM 8.2* 8.5* 8.3* 8.1* 7.8*   MG 2.4 2.4 1.8  --   --      CBC (Last 3 Results):   Recent Labs   Lab 09/21/20  0422 09/22/20  0739 09/23/20  0554   WBC 17.78* 24.21* 23.17*   RBC 2.45* 2.63* 2.51*   HGB 6.4* 7.0* 6.7*   HCT 22.4* 23.5* 22.9*   * 471* 486*   MCV 91 89 91   MCH 26.1* 26.6* 26.7*   MCHC 28.6* 29.8* 29.3*       Significant Diagnostics:  IR today

## 2020-09-23 NOTE — NURSING
Pt arrived to ROCU bed 2 for post abscess drain recovery. Report received from Kayla Gaines RN. Pt denies pain/discomfort. Dressing CDI. VSS. No acute events. RN to bedside. See flow sheets for post procedure monitoring.

## 2020-09-23 NOTE — PLAN OF CARE
Problem: Physical Therapy Goal  Goal: Physical Therapy Goal  Description: Patient re-evaluated 20 s/p further surgery.  Goals to be met by: 10/5/2020    Patient will increase functional independence with mobility by performin. Supine to sit with Set-up Wayne  2. Sit to supine with Set-up Wayne  3. Sit to stand transfer with Supervision  4. Bed to chair transfer with Supervision using Rolling Walker  5. Gait  x 120 feet with Stand-by Assistance using Rolling Walker.   6. Ascend/descend 3 stair with left Handrails Stand-by Assistance  Outcome: Ongoing, Progressing

## 2020-09-23 NOTE — PT/OT/SLP PROGRESS
"Physical Therapy Treatment    Patient Name:  Edita Riley   MRN:  [id number]    Recommendations:     Discharge Recommendations:  nursing facility, skilled   Discharge Equipment Recommendations: bath bench, walker, rolling, wheelchair   Barriers to discharge: weakness, increased assist for mobility    Assessment:     Edita Riley is a 55 y.o. female admitted with a medical diagnosis of Bilateral ureteral obstruction.  She presents with the following impairments/functional limitations:  weakness, impaired endurance, impaired functional mobilty, impaired self care skills, gait instability, impaired balance, decreased lower extremity function.  Today patient made good progress with therapy as evidenced by her ability to walk 3 trials, sit up OOB for hours and good motivation.     Rehab Prognosis: Good; patient would benefit from acute skilled PT services to address these deficits and reach maximum level of function.    Recent Surgery: Procedure(s):  COLECTOMY, RIGHT Extended  CREATION, ILEOSTOMY 6 Days Post-Op    Plan:     During this hospitalization, patient to be seen 4 x/week to address the identified rehab impairments via gait training, therapeutic exercises, therapeutic activities and progress toward the following goals:    · Plan of Care Expires:  10/21/20    Subjective     Chief Complaint: none  Patient/Family Comments/goals: "I feel much better today".   Pain/Comfort:  · Pain Rating 1: 0/10      Objective:     Communicated with nurse prior to session.  Patient found up in chair with colostomy, telemetry, nephrostomy, peripheral IV, PCA upon PT entry to room.     General Precautions: Standard, fall   Orthopedic Precautions:N/A   Braces: N/A     Functional Mobility:  · Bed Mobility:  Up in chair.   · Transfers:     · Sit to Stand:  contact guard assistance with rolling walker  · Gait: 35ft,40ft,70 ft with RW and CGA. Chair f/u for seated rests as patient fatigues.       AM-PAC 6 CLICK " MOBILITY  Turning over in bed (including adjusting bedclothes, sheets and blankets)?: 3  Sitting down on and standing up from a chair with arms (e.g., wheelchair, bedside commode, etc.): 3  Moving from lying on back to sitting on the side of the bed?: 3  Moving to and from a bed to a chair (including a wheelchair)?: 3  Need to walk in hospital room?: 3  Climbing 3-5 steps with a railing?: 2  Basic Mobility Total Score: 17       Therapeutic Activities and Exercises:   white board updated  instr to perform LE exercises which included: AP,LAQ,HS, good understanding.     Patient left up in chair with all lines intact and call button in reach..    GOALS:   Multidisciplinary Problems     Physical Therapy Goals        Problem: Physical Therapy Goal    Goal Priority Disciplines Outcome Goal Variances Interventions   Physical Therapy Goal     PT, PT/OT Ongoing, Progressing     Description: Patient re-evaluated 20 s/p further surgery.  Goals to be met by: 10/5/2020    Patient will increase functional independence with mobility by performin. Supine to sit with Set-up [place]  2. Sit to supine with Set-up [place]  3. Sit to stand transfer with Supervision  4. Bed to chair transfer with Supervision using Rolling Walker  5. Gait  x 120 feet with Stand-by Assistance using Rolling Walker.   6. Ascend/descend 3 stair with left Handrails Stand-by Assistance                   Time Tracking:     PT Received On: 20  PT Start Time: 1007     PT Stop Time: 1039  PT Total Time (min): 32 min     Billable Minutes: Gait Training 22 and Therapeutic Exercise 10    Treatment Type: Treatment  PT/PTA: PTA     PTA Visit Number: Jamil Boston, PTA  2020

## 2020-09-23 NOTE — PLAN OF CARE
Pt resting in bed comfortably. Up in chair throughout the day. Walked in fonseca with PT/OT this morning. PICC line intact and infusing, free of infection and irritation. Ileostomy and urostomy draining to ostomy bags, adequate output. Bilateral neph tubes clamped. Grenade drain in place to RLQ, draining to bulb suction. Fall precautions maintained, no falls noted. Call light within reach, bed locked and in lowest position. Non-skid socks on while out of bed. Patient instructed to call for assistance. Skin integrity maintained as patient is independent with frequent repositioning. C/o intermittent pain, managed with PCA pump. One episode of emesis, controlled with PRN meds. Progressing towards goals. Will continue to monitor and follow plan of care.

## 2020-09-24 PROBLEM — T81.49XA WOUND INFECTION AFTER SURGERY: Status: ACTIVE | Noted: 2020-09-24

## 2020-09-24 PROBLEM — E87.6 HYPOKALEMIA: Status: ACTIVE | Noted: 2020-09-24

## 2020-09-24 LAB
ALBUMIN SERPL BCP-MCNC: 1.8 G/DL (ref 3.5–5.2)
ALP SERPL-CCNC: 109 U/L (ref 55–135)
ALT SERPL W/O P-5'-P-CCNC: 14 U/L (ref 10–44)
ANION GAP SERPL CALC-SCNC: 12 MMOL/L (ref 8–16)
ANISOCYTOSIS BLD QL SMEAR: SLIGHT
AST SERPL-CCNC: 15 U/L (ref 10–40)
BACTERIA SPEC ANAEROBE CULT: ABNORMAL
BASO STIPL BLD QL SMEAR: ABNORMAL
BASOPHILS # BLD AUTO: ABNORMAL K/UL (ref 0–0.2)
BASOPHILS NFR BLD: 0 % (ref 0–1.9)
BILIRUB SERPL-MCNC: 0.3 MG/DL (ref 0.1–1)
BUN SERPL-MCNC: 17 MG/DL (ref 6–20)
CALCIUM SERPL-MCNC: 7.7 MG/DL (ref 8.7–10.5)
CHLORIDE SERPL-SCNC: 108 MMOL/L (ref 95–110)
CO2 SERPL-SCNC: 21 MMOL/L (ref 23–29)
CREAT SERPL-MCNC: 1 MG/DL (ref 0.5–1.4)
DIFFERENTIAL METHOD: ABNORMAL
EOSINOPHIL # BLD AUTO: ABNORMAL K/UL (ref 0–0.5)
EOSINOPHIL NFR BLD: 3 % (ref 0–8)
ERYTHROCYTE [DISTWIDTH] IN BLOOD BY AUTOMATED COUNT: 18.4 % (ref 11.5–14.5)
EST. GFR  (AFRICAN AMERICAN): >60 ML/MIN/1.73 M^2
EST. GFR  (NON AFRICAN AMERICAN): >60 ML/MIN/1.73 M^2
FINAL PATHOLOGIC DIAGNOSIS: NORMAL
GLUCOSE SERPL-MCNC: 188 MG/DL (ref 70–110)
HCT VFR BLD AUTO: 22.8 % (ref 37–48.5)
HGB BLD-MCNC: 6.7 G/DL (ref 12–16)
HYPOCHROMIA BLD QL SMEAR: ABNORMAL
IMM GRANULOCYTES # BLD AUTO: ABNORMAL K/UL (ref 0–0.04)
IMM GRANULOCYTES NFR BLD AUTO: ABNORMAL % (ref 0–0.5)
LYMPHOCYTES # BLD AUTO: ABNORMAL K/UL (ref 1–4.8)
LYMPHOCYTES NFR BLD: 10 % (ref 18–48)
MAGNESIUM SERPL-MCNC: 2.1 MG/DL (ref 1.6–2.6)
MCH RBC QN AUTO: 27.2 PG (ref 27–31)
MCHC RBC AUTO-ENTMCNC: 29.4 G/DL (ref 32–36)
MCV RBC AUTO: 93 FL (ref 82–98)
MONOCYTES # BLD AUTO: ABNORMAL K/UL (ref 0.3–1)
MONOCYTES NFR BLD: 7 % (ref 4–15)
NEUTROPHILS NFR BLD: 78 % (ref 38–73)
NEUTS BAND NFR BLD MANUAL: 2 %
NRBC BLD-RTO: 4 /100 WBC
PHOSPHATE SERPL-MCNC: 3.1 MG/DL (ref 2.7–4.5)
PLATELET # BLD AUTO: 489 K/UL (ref 150–350)
PLATELET BLD QL SMEAR: ABNORMAL
PMV BLD AUTO: 9.9 FL (ref 9.2–12.9)
POCT GLUCOSE: 160 MG/DL (ref 70–110)
POCT GLUCOSE: 177 MG/DL (ref 70–110)
POCT GLUCOSE: 186 MG/DL (ref 70–110)
POCT GLUCOSE: 232 MG/DL (ref 70–110)
POCT GLUCOSE: 316 MG/DL (ref 70–110)
POCT GLUCOSE: 95 MG/DL (ref 70–110)
POLYCHROMASIA BLD QL SMEAR: ABNORMAL
POTASSIUM SERPL-SCNC: 3.2 MMOL/L (ref 3.5–5.1)
PROT SERPL-MCNC: 5.8 G/DL (ref 6–8.4)
RBC # BLD AUTO: 2.46 M/UL (ref 4–5.4)
SODIUM SERPL-SCNC: 141 MMOL/L (ref 136–145)
TOXIC GRANULES BLD QL SMEAR: PRESENT
WBC # BLD AUTO: 16.84 K/UL (ref 3.9–12.7)

## 2020-09-24 PROCEDURE — 84100 ASSAY OF PHOSPHORUS: CPT

## 2020-09-24 PROCEDURE — 97803 MED NUTRITION INDIV SUBSEQ: CPT

## 2020-09-24 PROCEDURE — 85007 BL SMEAR W/DIFF WBC COUNT: CPT

## 2020-09-24 PROCEDURE — 87040 BLOOD CULTURE FOR BACTERIA: CPT

## 2020-09-24 PROCEDURE — 99900035 HC TECH TIME PER 15 MIN (STAT)

## 2020-09-24 PROCEDURE — 63600175 PHARM REV CODE 636 W HCPCS: Performed by: STUDENT IN AN ORGANIZED HEALTH CARE EDUCATION/TRAINING PROGRAM

## 2020-09-24 PROCEDURE — 25000003 PHARM REV CODE 250: Performed by: STUDENT IN AN ORGANIZED HEALTH CARE EDUCATION/TRAINING PROGRAM

## 2020-09-24 PROCEDURE — 11000001 HC ACUTE MED/SURG PRIVATE ROOM

## 2020-09-24 PROCEDURE — 83735 ASSAY OF MAGNESIUM: CPT

## 2020-09-24 PROCEDURE — 94770 HC EXHALED C02 TEST: CPT

## 2020-09-24 PROCEDURE — 94761 N-INVAS EAR/PLS OXIMETRY MLT: CPT

## 2020-09-24 PROCEDURE — 63600175 PHARM REV CODE 636 W HCPCS: Performed by: NURSE PRACTITIONER

## 2020-09-24 PROCEDURE — 85027 COMPLETE CBC AUTOMATED: CPT

## 2020-09-24 PROCEDURE — A4217 STERILE WATER/SALINE, 500 ML: HCPCS | Performed by: NURSE PRACTITIONER

## 2020-09-24 PROCEDURE — 25000003 PHARM REV CODE 250: Performed by: NURSE PRACTITIONER

## 2020-09-24 PROCEDURE — A4216 STERILE WATER/SALINE, 10 ML: HCPCS | Performed by: STUDENT IN AN ORGANIZED HEALTH CARE EDUCATION/TRAINING PROGRAM

## 2020-09-24 PROCEDURE — 36415 COLL VENOUS BLD VENIPUNCTURE: CPT

## 2020-09-24 PROCEDURE — B4185 PARENTERAL SOL 10 GM LIPIDS: HCPCS | Performed by: NURSE PRACTITIONER

## 2020-09-24 PROCEDURE — C9113 INJ PANTOPRAZOLE SODIUM, VIA: HCPCS | Performed by: STUDENT IN AN ORGANIZED HEALTH CARE EDUCATION/TRAINING PROGRAM

## 2020-09-24 PROCEDURE — 80053 COMPREHEN METABOLIC PANEL: CPT

## 2020-09-24 RX ORDER — ACETAMINOPHEN 325 MG/1
650 TABLET ORAL EVERY 6 HOURS PRN
Status: DISCONTINUED | OUTPATIENT
Start: 2020-09-24 | End: 2020-10-13 | Stop reason: HOSPADM

## 2020-09-24 RX ORDER — POTASSIUM CHLORIDE 7.45 MG/ML
10 INJECTION INTRAVENOUS
Status: COMPLETED | OUTPATIENT
Start: 2020-09-24 | End: 2020-09-24

## 2020-09-24 RX ORDER — ENOXAPARIN SODIUM 100 MG/ML
40 INJECTION SUBCUTANEOUS EVERY 24 HOURS
Status: DISCONTINUED | OUTPATIENT
Start: 2020-09-24 | End: 2020-09-27

## 2020-09-24 RX ORDER — METOPROLOL TARTRATE 25 MG/1
25 TABLET, FILM COATED ORAL 2 TIMES DAILY
Status: DISCONTINUED | OUTPATIENT
Start: 2020-09-24 | End: 2020-10-13 | Stop reason: HOSPADM

## 2020-09-24 RX ADMIN — ACETAMINOPHEN 650 MG: 325 TABLET ORAL at 04:09

## 2020-09-24 RX ADMIN — PANTOPRAZOLE SODIUM 40 MG: 40 INJECTION, POWDER, LYOPHILIZED, FOR SOLUTION INTRAVENOUS at 09:09

## 2020-09-24 RX ADMIN — INSULIN ASPART 2 UNITS: 100 INJECTION, SOLUTION INTRAVENOUS; SUBCUTANEOUS at 02:09

## 2020-09-24 RX ADMIN — METOPROLOL TARTRATE 25 MG: 25 TABLET, FILM COATED ORAL at 10:09

## 2020-09-24 RX ADMIN — CYANOCOBALAMIN 100 MCG: 1000 INJECTION, SOLUTION INTRAMUSCULAR at 09:09

## 2020-09-24 RX ADMIN — Medication 10 ML: at 12:09

## 2020-09-24 RX ADMIN — POTASSIUM CHLORIDE 10 MEQ: 7.46 INJECTION, SOLUTION INTRAVENOUS at 02:09

## 2020-09-24 RX ADMIN — INSULIN ASPART 4 UNITS: 100 INJECTION, SOLUTION INTRAVENOUS; SUBCUTANEOUS at 06:09

## 2020-09-24 RX ADMIN — POTASSIUM CHLORIDE 10 MEQ: 7.46 INJECTION, SOLUTION INTRAVENOUS at 10:09

## 2020-09-24 RX ADMIN — PIPERACILLIN SODIUM AND TAZOBACTAM SODIUM 4.5 G: 4; .5 INJECTION, POWDER, LYOPHILIZED, FOR SOLUTION INTRAVENOUS at 10:09

## 2020-09-24 RX ADMIN — POTASSIUM CHLORIDE 10 MEQ: 7.46 INJECTION, SOLUTION INTRAVENOUS at 01:09

## 2020-09-24 RX ADMIN — PIPERACILLIN SODIUM AND TAZOBACTAM SODIUM 4.5 G: 4; .5 INJECTION, POWDER, LYOPHILIZED, FOR SOLUTION INTRAVENOUS at 07:09

## 2020-09-24 RX ADMIN — ENOXAPARIN SODIUM 40 MG: 40 INJECTION SUBCUTANEOUS at 04:09

## 2020-09-24 RX ADMIN — PIPERACILLIN SODIUM AND TAZOBACTAM SODIUM 4.5 G: 4; .5 INJECTION, POWDER, LYOPHILIZED, FOR SOLUTION INTRAVENOUS at 03:09

## 2020-09-24 RX ADMIN — Medication 10 ML: at 11:09

## 2020-09-24 RX ADMIN — MAGNESIUM SULFATE HEPTAHYDRATE: 500 INJECTION, SOLUTION INTRAMUSCULAR; INTRAVENOUS at 10:09

## 2020-09-24 RX ADMIN — INSULIN ASPART 8 UNITS: 100 INJECTION, SOLUTION INTRAVENOUS; SUBCUTANEOUS at 11:09

## 2020-09-24 RX ADMIN — INSULIN ASPART 1 UNITS: 100 INJECTION, SOLUTION INTRAVENOUS; SUBCUTANEOUS at 12:09

## 2020-09-24 RX ADMIN — POTASSIUM CHLORIDE 10 MEQ: 7.46 INJECTION, SOLUTION INTRAVENOUS at 09:09

## 2020-09-24 RX ADMIN — INSULIN ASPART 2 UNITS: 100 INJECTION, SOLUTION INTRAVENOUS; SUBCUTANEOUS at 05:09

## 2020-09-24 RX ADMIN — METOPROLOL TARTRATE 25 MG: 25 TABLET, FILM COATED ORAL at 09:09

## 2020-09-24 RX ADMIN — Medication 10 ML: at 05:09

## 2020-09-24 RX ADMIN — POTASSIUM CHLORIDE 10 MEQ: 7.46 INJECTION, SOLUTION INTRAVENOUS at 12:09

## 2020-09-24 RX ADMIN — Medication 10 ML: at 06:09

## 2020-09-24 RX ADMIN — I.V. FAT EMULSION 250 ML: 20 EMULSION INTRAVENOUS at 10:09

## 2020-09-24 NOTE — ASSESSMENT & PLAN NOTE
Chronic, controlled.  Latest blood pressure and vitals reviewed-   Temp:  [97 °F (36.1 °C)-99.5 °F (37.5 °C)]   Pulse:  []   Resp:  [18-23]   BP: (139-161)/(64-87)   SpO2:  [97 %-99 %] .   Home meds for hypertension were reviewed and noted below. Hospital anti-hypertensive changes were made as shown below.  Hospital Medications             labetalol 20 mg/4 mL (5 mg/mL) IV syring 10 mg, Intravenous, Every 4 hours PRN, 1. Confirm patient on continuous cardiac monitor (obtain order if needed)<BR>2. Obstetrics is exempt from continuous cardiac monitoring. <BR>3. Monitor ECG rhythm throughout administrations noting rhythm and changes<BR>4. Monitor BP and HR pre-administration, post-administration, and every 30 minutes x1 after dose given<BR>5. Administer Labetalol at rate no faster than 10mg over 1 minute.<BR>6. Contraindications: HR &lt;50, SBP&lt;100, second or third degree AV block. If assessed, hold dose and call provider.        Will utilize p.r.n. blood pressure medication only if patient's blood pressure greater than  180/110 and she develops symptoms such as worsening chest pain or shortness of breath.

## 2020-09-24 NOTE — ASSESSMENT & PLAN NOTE
Ms. Riley is a 55 y.o. female with b/l ureteral obstruction s/p transverse colon conduit on 9/11/2020. Extended R colectomy and ileostomy creation on 9/17.     - Diet regular  - Continue TPN  - D/c daily cbc  - Zosyn  -  IR for new fluid collection  - OOB, activity as tolerated  - PT/OT  -discharge planning, will need LTC

## 2020-09-24 NOTE — CONSULTS
Wound care consult received from NP for assessment of midline abdominal incision site. Patient known to wound care team from this admission.     Assessments and pictures listed below. Recommend wound vac therapy to assist in wound closure and help keep wound clean and remove bacteria. MD team notified with recommendations.     Wound care to continue to follow pt PRN [id number]      Upper midline abdominal incision  -Full thickness wound with non viable tissue to 20% of wound bed measuring 10 x 5 x 3 with 3.5 cm tunneling @ 12 o'clock and 2 cm tunneling at 10-11 o'clock . No odor. Minimal serosanguineous drainage.         Lower midline abdominal incision  -Full thickness wound measuring 5 x 3 x 3.5 with 3.5 cm tunneling @ 12 and 6 o'clock. No odor. Minimal serosanguineous drainage.

## 2020-09-24 NOTE — ASSESSMENT & PLAN NOTE
Nutrition Problem:  Moderate Protein-Calorie Malnutrition  Malnutrition in the context of Chronic Illness/Injury    Related to (etiology):  Inadequate Energy Intake; Decreased appetite with N/V    Signs and Symptoms (as evidenced by):  Energy Intake: <75% of estimated energy requirement for > 3 months  Body Fat Depletion: well-nourished   Muscle Mass Depletion: mild depletion of temples and clavicle region   Weight Loss: 17% x 6 months      Interventions (treatment strategy):  Collaboration of nutrition care with other providers  Parenteral Nutrition     Nutrition Diagnosis Status:  Continues

## 2020-09-24 NOTE — ASSESSMENT & PLAN NOTE
The patient is a 55-year-old female who is now 1 week out from a urinary diversion by Dr Balbuena due to bladder outlet obstruction from prior pelvic radiation therapy.  At time of that procedure, I procured a vascularized segment of transverse colon to be used as the urinary conduit, as requested by Urology.  A primary end-to-end, hand-sewn colocolonic anastomosis was performed at that time to restore intestinal continuity once the segment of colon to be used for the conduit had been excluded.  The patient did well for the 1st few days postoperatively but then began to develop abdominal pain and distention consistent with an ileus.  This morning, there was small amount of pneumoperitoneum seen on a plain abdominal film, and later today she began to drain feculent appearing fluid through her Kurtis-Arias drain.  She had a normal white blood cell count and did not have peritonitis on examination, so a CT scan was performed, which demonstrated a large volume of free abdominal fluid as well as a large volume of pneumoperitoneum, concerning for an anastomotic leak.  She is being brought back to the operating room for urgent exploratory laparotomy.    OR 9/17/2020    Cont TPN  Consult wound care for ileosotmy  Naseem vaughan, aubrey  Enc amb and IS  casandra smal amt of diet

## 2020-09-24 NOTE — PROGRESS NOTES
[facility]  Urology  Progress Note    Patient Name: Edita Riley  MRN: [id number]  Admission Date: 9/11/2020  Hospital Length of Stay: 13 days  Code Status: Full Code   Attending Provider: Hammad Reynolds MD   Primary Care Physician: Audrey Mustafa MD    Subjective:     HPI:  Edita Riley is a 55 y.o. female with a history of cervical cancer s/p pelvic radiation. She has since developed bilateral ureteral strictures and bilateral hydronephrosis R>L. She had a MAG3 scan confirmed presence of bilateral obstruction. She is s/p open transverse colon conduit urinary diversion on 9/11/2020.     Interval History:   IR drain placed yesterday to drain free fluid seen in scan, ORESTES Cr 0.7, not indicative of urine leak   Ambulated in the halls with PT yesterday using walker   On zosyn, wbc trending down   Tolerating clear liquids PO without N/V    Review of Systems    Objective:     Temp:  [97 °F (36.1 °C)-98.7 °F (37.1 °C)] 97 °F (36.1 °C)  Pulse:  [] 100  Resp:  [18-23] 20  SpO2:  [96 %-100 %] 98 %  BP: (139-172)/(64-98) 161/87     Body mass index is 28.94 kg/m².           Drains     Drain                 Nephrostomy 08/13/20 0805 Left 12 Fr. 38 days         Nephrostomy 08/13/20 0809 Right 12 Fr. 38 days         Colostomy 09/18/20 0025 RUQ 2 days         Ileostomy 09/18/20 0025 LUQ 2 days         Nephrostomy 09/18/20 0025 Left 2 days         Nephrostomy 09/18/20 0025 Right 2 days                Physical Exam   Constitutional: No distress.   HENT:   Head: Normocephalic and atraumatic.   Eyes: Conjunctivae are normal. No scleral icterus.   Neck: Normal range of motion.   Cardiovascular: Normal rate.    Pulmonary/Chest: Effort normal. No respiratory distress.   Abdominal: Soft. She exhibits no distension. There is abdominal tenderness (appropriate). There is no rebound and no guarding.   Urostomy p/p/p draining clear yellow urine  Ureteral stents in place  Ileostomy with dark  liquid fluid output  IR drain LLQ draining light yellow clear fluid   Musculoskeletal: Normal range of motion.      Comments: SCDs in place   Neurological: She is alert.   Skin: Skin is warm and dry. She is not diaphoretic.         Significant Labs:    BMP:  Recent Labs   Lab 09/22/20  0739 09/23/20  0554 09/24/20  0354   * 142 141   K 2.5* 3.1* 3.2*    108 108   CO2 26 23 21*   BUN 13 14 17   CREATININE 0.8 0.9 1.0   CALCIUM 8.1* 7.8* 7.7*       CBC:   Recent Labs   Lab 09/22/20  0739 09/23/20  0554 09/24/20  0354   WBC 24.21* 23.17* 16.84*   HGB 7.0* 6.7* 6.7*   HCT 23.5* 22.9* 22.8*   * 486* 489*       All pertinent labs results from the past 24 hours have been reviewed.    Significant Imaging:  All pertinent imaging results/findings from the past 24 hours have been reviewed.                  Assessment/Plan:     * Bilateral ureteral obstruction  S/p urinary diversion with colon conduit 9/11   S/p emergent ex-lap 9/17, colo-colic anastomosis intact, bowel perforation found, underwent resection of right colon, remaining transverses colon, and proximal descending colon, ileostomy creation, Charlotte's pouch created     - CRS primary  - TPN per primary service  - continue PT/OT, OOBTC as tolerated, encourage ambulation   - Wound care ostomy consulted for assistance with ostomy teaching   - patient is a Islam; Hgb decreased to 6.7, management per primary and Heme-Onc (on EPO, B12, and iron)  - Drains: maintain, IR drain (Cr 0.7), ureteral stents, and red rubber  - Prophylaxis: IS, SCDs, GI ppx        VTE Risk Mitigation (From admission, onward)         Ordered     enoxaparin injection 40 mg  Every 24 hours      09/24/20 0544     IP VTE HIGH RISK PATIENT  Once      09/11/20 2024     Place sequential compression device  Until discontinued      09/11/20 2024                Mana Wilks MD  Urology  [facility]

## 2020-09-24 NOTE — RESPIRATORY THERAPY
Rapid Response Respiratory Therapy ETCO2 Check     Time of visit: 1235     Code Status: Full Code   : 1965  Bed: 524/524 A:   MRN: [id number]    SITUATION     Evaluated patient for: ETCO2 Compliance     BACKGROUND     Patient has a past medical history of Abnormal mammogram, Anxiety, Cervical cancer, Chronic back pain, Depression, Diarrhea due to malabsorption, Fibromyalgia, Generalized abdominal pain, GERD (gastroesophageal reflux disease), Hiatal hernia, History of cervical cancer, History of cervical cancer, psychiatric care, Hypertension, Hypomagnesemia, Lactose intolerance, Metastatic squamous cell carcinoma to lymph node, Nephrostomy tube displaced, 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 drug, Osteoarthritis of back, Psychiatric problem, and Stroke.    ASSESSMENT     Is the ETCO2 monitor on? (Yes/No)  yes   Is the patient wearing a cannula? (Yes/No) yes  Are ETCO2 orders placed? (Yes/No) yes  Is the patient on a PCA pump? (Yes/No) yes  ETCO2 monitored: ETCO2 (mmHg): 30 mmHg  O2 Device/Concentration: ra/21%  Pulse: 95 Respiratory rate: 30 Temperature: Temp: 99.4 °F (37.4 °C) BP: BP: 120/79 SpO2: 99  Level of Consciousness: Level of Consciousness (AVPU): alert  All Lung Field Breath Sounds: All Lung Fields Breath Sounds: diminished, Lateral:, Posterior:, Anterior:  MARYANN Breath Sounds: diminished  LLL Breath Sounds: diminished  RUL Breath Sounds: diminished  RML Breath Sounds: diminished  RLL Breath Sounds: diminished  Ambu at bedside: Ambu bag with the patient?: Yes, Adult Ambu    INTERVENTIONS/RECOMMENDATIONS     continue plan of care    PHYSICIAN ESCALATION     Physician Escalation (Yes/No): no       Discussed plan of care primary RT, Ember     FOLLOW-UP     Please call back the Rapid Response Derek SUN RT at x [phone] for any questions or concerns.

## 2020-09-24 NOTE — PROGRESS NOTES
"[facility]  Adult Nutrition  Progress Note    SUMMARY       Recommendations     Pt meets chronic moderate malnutrition criteria.     1. Continue regular diet as tolerated.     2. Add Boost Plus with meals to aid in po caloric intake.     3. Current TPN + IV lipids meeting needs.     4. RD following.     Goals: meet >75% EEN/EPN by RD follow-up  Nutrition Goal Status: goal met  Communication of RD Recs: (POC)    Reason for Assessment    Reason For Assessment: RD follow-up  Diagnosis: (Bilateral ureteral obstruction )  Relevant Medical History: cervical cancer s/p pelvic radiation, Fibromyalgia, HTN  Interdisciplinary Rounds: did not attend  General Information Comments: TPN + IV lipids infusing. Pt sitting in chair with no family members at bedside. Pt reports good appetite at this time. Per chart review, pt eating 25% of meals. Denies N/V. Pt agreed to have Boost Plus with meals. NFPE completed . Pt with mild muscle/fat wasting. Pt meets chronic moderate malnutrition criteria.   Nutrition Discharge Planning: unable to determine at this time    Nutrition Risk Screen    Nutrition Risk Screen: tube feeding or parenteral nutrition    Nutrition/Diet History    Spiritual, Cultural Beliefs, Temple Practices, Values that Affect Care: no  Factors Affecting Nutritional Intake: altered gastrointestinal function, NPO    Anthropometrics    Temp: 99.4 °F (37.4 °C)  Height Method: Measured  Height: 5' 9" (175.3 cm)  Height (inches): 69 in  Weight Method: Bed Scale  Weight: 88.9 kg (196 lb)  Weight (lb): 196 lb  Ideal Body Weight (IBW), Female: 145 lb  % Ideal Body Weight, Female (lb): 135.17 %  BMI (Calculated): 28.9  BMI Grade: 25 - 29.9 - overweight  Weight Loss: unintentional  Usual Body Weight (UBW), k.5 kg(per chart review 3/22/20)  % Usual Body Weight: 82.88  % Weight Change From Usual Weight: -17.3 %     Lab/Procedures/Meds    Pertinent Labs Reviewed: reviewed  Pertinent Labs Comments: K " 3.2, glucose 188  Pertinent Medications Reviewed: reviewed  Pertinent Medications Comments: PCA    Estimated/Assessed Needs    Weight Used For Calorie Calculations: 88.9 kg (195 lb 15.8 oz)  Energy Calorie Requirements (kcal): 1922 kcal/day  Energy Need Method: [place]-St Jeor(x 1.25)  Protein Requirements:  gm/day(1.0-1.2 gm/kg)  Weight Used For Protein Calculations: 88.9 kg (195 lb 15.8 oz)  Fluid Requirements (mL): 1 mL/kcal or per MD     RDA Method (mL): 1922     Nutrition Prescription Ordered    Current Diet Order: Regular  Current Nutrition Support Formula Ordered: (Custom TPN 95 gm AA / 275 gm Dex + IV lipids)  Current Nutrition Support Rate Ordered: 60 (ml)  Current Nutrition Support Frequency Ordered: mL/hr    Evaluation of Received Nutrient/Fluid Intake    Parenteral Calories (kcal): 1315  Parenteral Protein (gm): 95  Parenteral Fluid (mL): 1440  Lipid Calories (kcals): 500 kcals  GIR (Glucose Infusion Rate) (mg/kg/min): 2.15 mg/kg/min  Total Calories (kcal): 1815  % Kcal Needs: 96  % Protein Needs: 100  I/O: -3.26L x 24 hrs, -12.047L since admit  Energy Calories Required: meeting needs  Protein Required: meeting needs  Fluid Required: (per MD)  Comments: LBM 9/23(ostomy)  Tolerance: tolerating  % Intake of Estimated Energy Needs: 75 - 100 %  % Meal Intake: 25 - 50 %    Nutrition Risk    Level of Risk/Frequency of Follow-up: (f/u 1 x wk)     Assessment and Plan    Moderate malnutrition  Nutrition Problem:  Moderate Protein-Calorie Malnutrition  Malnutrition in the context of Chronic Illness/Injury    Related to (etiology):  Inadequate Energy Intake; Decreased appetite with N/V    Signs and Symptoms (as evidenced by):  Energy Intake: <75% of estimated energy requirement for > 3 months  Body Fat Depletion: well-nourished   Muscle Mass Depletion: mild depletion of temples and clavicle region   Weight Loss: 17% x 6 months      Interventions (treatment strategy):  Collaboration of nutrition care with  other providers  Parenteral Nutrition     Nutrition Diagnosis Status:  Continues              Monitor and Evaluation    Food and Nutrient Intake: energy intake, food and beverage intake, parenteral nutrition intake  Food and Nutrient Adminstration: diet order, enteral and parenteral nutrition administration  Physical Activity and Function: nutrition-related ADLs and IADLs  Anthropometric Measurements: weight, weight change, body mass index  Biochemical Data, Medical Tests and Procedures: electrolyte and renal panel, gastrointestinal profile, glucose/endocrine profile, inflammatory profile, lipid profile  Nutrition-Focused Physical Findings: overall appearance     Malnutrition Assessment  Malnutrition Type: chronic illness          Weight Loss (Malnutrition): (17% x 1 month)  Energy Intake (Malnutrition): less than 75% for greater than or equal to 3 months   Upper Arm Region (Subcutaneous Fat Loss): well nourished   Pentecostalism Region (Muscle Loss): mild depletion  Clavicle Bone Region (Muscle Loss): mild depletion  Clavicle and Acromion Bone Region (Muscle Loss): well nourished  Anterior Thigh Region (Muscle Loss): well nourished                 Nutrition Follow-Up    RD Follow-up?: Yes

## 2020-09-24 NOTE — PROGRESS NOTES
[facility]  Colorectal Surgery  Progress Note    Patient Name: Edita Riley  MRN: [id number]  Admission Date: 9/11/2020  Hospital Length of Stay: 13 days  Attending Physician: Hammad Reynolds MD    Subjective:     Interval History: no acute events overnite, eating a little, ileosotmy with output    Post-Op Info:  Procedure(s):  COLECTOMY, RIGHT Extended  CREATION, ILEOSTOMY   7 Days Post-Op      Medications:  Continuous Infusions:   hydromorphone in 0.9 % NaCl 6 mg/30 ml      TPN ADULT CENTRAL LINE CUSTOM 60 mL/hr at 09/23/20 2152    TPN ADULT CENTRAL LINE CUSTOM       Scheduled Meds:   cyanocobalamin  100 mcg Intramuscular Daily    [START ON 9/26/2020] cyanocobalamin  100 mcg Intramuscular Weekly    enoxaparin  40 mg Subcutaneous Q24H    epoetin yecenia-epbx  20,000 Units Subcutaneous Q48H    fat emulsion 20%  250 mL Intravenous Daily    metoprolol tartrate  25 mg Oral BID    pantoprazole  40 mg Intravenous Daily    piperacillin-tazobactam (ZOSYN) IVPB  4.5 g Intravenous Q8H    potassium chloride in water  10 mEq Intravenous Q1H    sodium chloride 0.9%  10 mL Intravenous Q6H     PRN Meds:   dextrose 50%    glucagon (human recombinant)    insulin aspart U-100    iohexol    iohexol    labetalol    naloxone    ondansetron    sodium chloride 0.9%    sodium chloride 0.9%    sodium chloride 0.9%        Objective:     Vital Signs (Most Recent):  Temp: 99.4 °F (37.4 °C) (09/24/20 1102)  Pulse: 108 (09/24/20 0819)  Resp: 18 (09/24/20 0819)  BP: (!) 157/85 (09/24/20 0819)  SpO2: 98 % (09/24/20 0819) Vital Signs (24h Range):  Temp:  [97 °F (36.1 °C)-99.5 °F (37.5 °C)] 99.4 °F (37.4 °C)  Pulse:  [] 108  Resp:  [18-23] 18  SpO2:  [97 %-99 %] 98 %  BP: (139-161)/(64-87) 157/85     Intake/Output - Last 3 Shifts       09/22 0700 - 09/23 0659 09/23 0700 - 09/24 0659 09/24 0700 - 09/25 0659    P.O.  0     Other 0      Total Intake(mL/kg) 0 (0) 0 (0)     Urine (mL/kg/hr) 0029  (0.6) 1225 (0.6) 250 (0.6)    Emesis/NG output 100 0     Drains  210 10    Stool 925 1825 400    Total Output 2400 3260 660    Net -2400 -3260 -660           Stool Occurrence  2 x     Emesis Occurrence  1 x           Physical Exam  Vitals signs and nursing note reviewed.   Constitutional:       Appearance: She is well-developed. She is not ill-appearing.   HENT:      Head: Normocephalic.   Eyes:      Pupils: Pupils are equal, round, and reactive to light.   Cardiovascular:      Rate and Rhythm: Normal rate and regular rhythm.      Heart sounds: Normal heart sounds.   Pulmonary:      Effort: Pulmonary effort is normal. No respiratory distress.      Breath sounds: Normal breath sounds. No wheezing or rales.   Abdominal:      Palpations: Abdomen is soft. There is no mass.      Tenderness: There is no guarding or rebound.      Comments: abd inc line opened at bedside, purulent drainage  Ileostomy functional  Urostomy functional  IR drain with purulent drainage   Skin:     General: Skin is warm and dry.   Neurological:      Mental Status: She is alert and oriented to person, place, and time.   Psychiatric:         Behavior: Behavior normal.         Thought Content: Thought content normal.         Judgment: Judgment normal.           Significant Labs:  BMP (Last 3 Results):   Recent Labs   Lab 09/20/20  0201 09/21/20  0422 09/22/20  0739 09/23/20  0554 09/24/20  0354   * 142* 201* 203* 188*   * 147* 147* 142 141   K 3.3* 3.4* 2.5* 3.1* 3.2*   * 111* 109 108 108   CO2 28 26 26 23 21*   BUN 25* 21* 13 14 17   CREATININE 0.8 0.8 0.8 0.9 1.0   CALCIUM 8.5* 8.3* 8.1* 7.8* 7.7*   MG 2.4 1.8  --   --  2.1     CBC (Last 3 Results):   Recent Labs   Lab 09/22/20  0739 09/23/20  0554 09/24/20  0354   WBC 24.21* 23.17* 16.84*   RBC 2.63* 2.51* 2.46*   HGB 7.0* 6.7* 6.7*   HCT 23.5* 22.9* 22.8*   * 486* 489*   MCV 89 91 93   MCH 26.6* 26.7* 27.2   MCHC 29.8* 29.3* 29.4*       Significant  Diagnostics:  None    Assessment/Plan:     * Bilateral ureteral obstruction  Ms. Riley is a 55 y.o. female with b/l ureteral obstruction s/p transverse colon conduit on 9/11/2020. Extended R colectomy and ileostomy creation on 9/17.     - Diet regular  - Continue TPN  - D/c daily cbc  - Zosyn  -  IR for new fluid collection  - OOB, activity as tolerated  - PT/OT  -discharge planning, will need LTC      Wound infection after surgery  Wound now open, local wound care, poss wound vac  Continue zosyn    Hypokalemia  Patient has hypokalemia which is currently uncontrolled. Last electrolytes reviewed-   Recent Labs   Lab 09/23/20  0554 09/24/20  0354   K 3.1* 3.2*   . Will replace potassium and monitor electrolytes closely.     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  At time of that procedure, I procured a vascularized segment of transverse colon to be used as the urinary conduit, as requested by Urology.  A primary end-to-end, hand-sewn colocolonic anastomosis was performed at that time to restore intestinal continuity once the segment of colon to be used for the conduit had been excluded.  The patient did well for the 1st few days postoperatively but then began to develop abdominal pain and distention consistent with an ileus.  This morning, there was small amount of pneumoperitoneum seen on a plain abdominal film, and later today she began to drain feculent appearing fluid through her Kurtis-Arias drain.  She had a normal white blood cell count and did not have peritonitis on examination, so a CT scan was performed, which demonstrated a large volume of free abdominal fluid as well as a large volume of  pneumoperitoneum, concerning for an anastomotic leak.  She is being brought back to the operating room for urgent exploratory laparotomy.    OR 9/17/2020    Cont TPN  Consult wound care for ileosotmy  Naseem vaughan, scd  Enc amb and IS  casandra smal amt of diet      Moderate malnutrition  Consult dietary  TPN  Monitor labs  Appreciate dietary inp;ut    Severe episode of recurrent major depressive disorder, with psychotic features  Cont home m eds    Impaired functional mobility, balance, gait, and endurance  PT/OT    Essential hypertension  Chronic, controlled.  Latest blood pressure and vitals reviewed-   Temp:  [97 °F (36.1 °C)-99.5 °F (37.5 °C)]   Pulse:  []   Resp:  [18-23]   BP: (139-161)/(64-87)   SpO2:  [97 %-99 %] .   Home meds for hypertension were reviewed and noted below. Hospital anti-hypertensive changes were made as shown below.  Hospital Medications             labetalol 20 mg/4 mL (5 mg/mL) IV syring 10 mg, Intravenous, Every 4 hours PRN, 1. Confirm patient on continuous cardiac monitor (obtain order if needed)<BR>2. Obstetrics is exempt from continuous cardiac monitoring. <BR>3. Monitor ECG rhythm throughout administrations noting rhythm and changes<BR>4. Monitor BP and HR pre-administration, post-administration, and every 30 minutes x1 after dose given<BR>5. Administer Labetalol at rate no faster than 10mg over 1 minute.<BR>6. Contraindications: HR &lt;50, SBP&lt;100, second or third degree AV block. If assessed, hold dose and call provider.        Will utilize p.r.n. blood pressure medication only if patient's blood pressure greater than  180/110 and she develops symptoms such as worsening chest pain or shortness of breath.          Kerri Castillo NP  Colorectal Surgery  [facility]

## 2020-09-24 NOTE — ASSESSMENT & PLAN NOTE
S/p urinary diversion with colon conduit 9/11   S/p emergent ex-lap 9/17, colo-colic anastomosis intact, bowel perforation found, underwent resection of right colon, remaining transverses colon, and proximal descending colon, ileostomy creation, Charlotte's pouch created     - CRS primary  - TPN per primary service  - continue PT/OT, OOBTC as tolerated, encourage ambulation   - Wound care ostomy consulted for assistance with ostomy teaching   - patient is a [place]; Hgb decreased to 6.7, management per primary and Heme-Onc (on EPO, B12, and iron)  - Drains: maintain, IR drain (Cr 0.7), ureteral stents, and red rubber  - Prophylaxis: IS, SCDs, GI ppx

## 2020-09-24 NOTE — SUBJECTIVE & OBJECTIVE
Subjective:     Interval History: no acute events overnite, eating a little, ileosotmy with output    Post-Op Info:  Procedure(s):  COLECTOMY, RIGHT Extended  CREATION, ILEOSTOMY   7 Days Post-Op      Medications:  Continuous Infusions:   hydromorphone in 0.9 % NaCl 6 mg/30 ml      TPN ADULT CENTRAL LINE CUSTOM 60 mL/hr at 09/23/20 2152    TPN ADULT CENTRAL LINE CUSTOM       Scheduled Meds:   cyanocobalamin  100 mcg Intramuscular Daily    [START ON 9/26/2020] cyanocobalamin  100 mcg Intramuscular Weekly    enoxaparin  40 mg Subcutaneous Q24H    epoetin yecenia-epbx  20,000 Units Subcutaneous Q48H    fat emulsion 20%  250 mL Intravenous Daily    metoprolol tartrate  25 mg Oral BID    pantoprazole  40 mg Intravenous Daily    piperacillin-tazobactam (ZOSYN) IVPB  4.5 g Intravenous Q8H    potassium chloride in water  10 mEq Intravenous Q1H    sodium chloride 0.9%  10 mL Intravenous Q6H     PRN Meds:   dextrose 50%    glucagon (human recombinant)    insulin aspart U-100    iohexol    iohexol    labetalol    naloxone    ondansetron    sodium chloride 0.9%    sodium chloride 0.9%    sodium chloride 0.9%        Objective:     Vital Signs (Most Recent):  Temp: 99.4 °F (37.4 °C) (09/24/20 1102)  Pulse: 108 (09/24/20 0819)  Resp: 18 (09/24/20 0819)  BP: (!) 157/85 (09/24/20 0819)  SpO2: 98 % (09/24/20 0819) Vital Signs (24h Range):  Temp:  [97 °F (36.1 °C)-99.5 °F (37.5 °C)] 99.4 °F (37.4 °C)  Pulse:  [] 108  Resp:  [18-23] 18  SpO2:  [97 %-99 %] 98 %  BP: (139-161)/(64-87) 157/85     Intake/Output - Last 3 Shifts       09/22 0700 - 09/23 0659 09/23 0700 - 09/24 0659 09/24 0700 - 09/25 0659    P.O.  0     Other 0      Total Intake(mL/kg) 0 (0) 0 (0)     Urine (mL/kg/hr) 1375 (0.6) 1225 (0.6) 250 (0.6)    Emesis/NG output 100 0     Drains  210 10    Stool 925 1825 400    Total Output 2400 0742 791    Net -2400 -5383 -836           Stool Occurrence  2 x     Emesis Occurrence  1 x           Physical  Exam  Vitals signs and nursing note reviewed.   Constitutional:       Appearance: She is well-developed. She is not ill-appearing.   HENT:      Head: Normocephalic.   Eyes:      Pupils: Pupils are equal, round, and reactive to light.   Cardiovascular:      Rate and Rhythm: Normal rate and regular rhythm.      Heart sounds: Normal heart sounds.   Pulmonary:      Effort: Pulmonary effort is normal. No respiratory distress.      Breath sounds: Normal breath sounds. No wheezing or rales.   Abdominal:      Palpations: Abdomen is soft. There is no mass.      Tenderness: There is no guarding or rebound.      Comments: abd inc line opened at bedside, purulent drainage  Ileostomy functional  Urostomy functional  IR drain with purulent drainage   Skin:     General: Skin is warm and dry.   Neurological:      Mental Status: She is alert and oriented to person, place, and time.   Psychiatric:         Behavior: Behavior normal.         Thought Content: Thought content normal.         Judgment: Judgment normal.           Significant Labs:  BMP (Last 3 Results):   Recent Labs   Lab 09/20/20  0201 09/21/20  0422 09/22/20  0739 09/23/20  0554 09/24/20  0354   * 142* 201* 203* 188*   * 147* 147* 142 141   K 3.3* 3.4* 2.5* 3.1* 3.2*   * 111* 109 108 108   CO2 28 26 26 23 21*   BUN 25* 21* 13 14 17   CREATININE 0.8 0.8 0.8 0.9 1.0   CALCIUM 8.5* 8.3* 8.1* 7.8* 7.7*   MG 2.4 1.8  --   --  2.1     CBC (Last 3 Results):   Recent Labs   Lab 09/22/20  0739 09/23/20  0554 09/24/20  0354   WBC 24.21* 23.17* 16.84*   RBC 2.63* 2.51* 2.46*   HGB 7.0* 6.7* 6.7*   HCT 23.5* 22.9* 22.8*   * 486* 489*   MCV 89 91 93   MCH 26.6* 26.7* 27.2   MCHC 29.8* 29.3* 29.4*       Significant Diagnostics:  None

## 2020-09-24 NOTE — PLAN OF CARE
Problem: Oral Intake Inadequate (Acute Kidney Injury/Impairment)  Goal: Optimal Nutrition Intake  Outcome: Ongoing, Progressing     Problem: Malnutrition  Goal: Improved Nutritional Intake  Outcome: Ongoing, Progressing       Recommendations     Pt meets chronic moderate malnutrition criteria.     1. Continue regular diet as tolerated.     2. Add Boost Plus with meals to aid in po caloric intake.     3. Current TPN + IV lipids meeting needs.     4. RD following.     Goals: meet >75% EEN/EPN by RD follow-up  Nutrition Goal Status: goal met

## 2020-09-24 NOTE — SUBJECTIVE & OBJECTIVE
Interval History:   IR drain placed yesterday to drain free fluid seen in scan, ORESTES Cr 0.7, not indicative of urine leak   Ambulated in the halls with PT yesterday using walker   On zosyn, wbc trending down   Tolerating clear liquids PO without N/V    Review of Systems    Objective:     Temp:  [97 °F (36.1 °C)-98.7 °F (37.1 °C)] 97 °F (36.1 °C)  Pulse:  [] 100  Resp:  [18-23] 20  SpO2:  [96 %-100 %] 98 %  BP: (139-172)/(64-98) 161/87     Body mass index is 28.94 kg/m².           Drains     Drain                 Nephrostomy 08/13/20 0805 Left 12 Fr. 38 days         Nephrostomy 08/13/20 0809 Right 12 Fr. 38 days         Colostomy 09/18/20 0025 RUQ 2 days         Ileostomy 09/18/20 0025 LUQ 2 days         Nephrostomy 09/18/20 0025 Left 2 days         Nephrostomy 09/18/20 0025 Right 2 days                Physical Exam   Constitutional: No distress.   HENT:   Head: Normocephalic and atraumatic.   Eyes: Conjunctivae are normal. No scleral icterus.   Neck: Normal range of motion.   Cardiovascular: Normal rate.    Pulmonary/Chest: Effort normal. No respiratory distress.   Abdominal: Soft. She exhibits no distension. There is abdominal tenderness (appropriate). There is no rebound and no guarding.   Urostomy p/p/p draining clear yellow urine  Ureteral stents in place  Ileostomy with dark liquid fluid output  IR drain LLQ draining light yellow clear fluid   Musculoskeletal: Normal range of motion.      Comments: SCDs in place   Neurological: She is alert.   Skin: Skin is warm and dry. She is not diaphoretic.         Significant Labs:    BMP:  Recent Labs   Lab 09/22/20  0739 09/23/20  0554 09/24/20  0354   * 142 141   K 2.5* 3.1* 3.2*    108 108   CO2 26 23 21*   BUN 13 14 17   CREATININE 0.8 0.9 1.0   CALCIUM 8.1* 7.8* 7.7*       CBC:   Recent Labs   Lab 09/22/20  0739 09/23/20  0554 09/24/20  0354   WBC 24.21* 23.17* 16.84*   HGB 7.0* 6.7* 6.7*   HCT 23.5* 22.9* 22.8*   * 486* 489*       All  pertinent labs results from the past 24 hours have been reviewed.    Significant Imaging:  All pertinent imaging results/findings from the past 24 hours have been reviewed.

## 2020-09-25 LAB
ALBUMIN SERPL BCP-MCNC: 1.8 G/DL (ref 3.5–5.2)
ALBUMIN SERPL BCP-MCNC: 1.9 G/DL (ref 3.5–5.2)
ALP SERPL-CCNC: 100 U/L (ref 55–135)
ALP SERPL-CCNC: 107 U/L (ref 55–135)
ALT SERPL W/O P-5'-P-CCNC: 15 U/L (ref 10–44)
ALT SERPL W/O P-5'-P-CCNC: 16 U/L (ref 10–44)
ANION GAP SERPL CALC-SCNC: 10 MMOL/L (ref 8–16)
ANION GAP SERPL CALC-SCNC: 9 MMOL/L (ref 8–16)
ANISOCYTOSIS BLD QL SMEAR: SLIGHT
ANISOCYTOSIS BLD QL SMEAR: SLIGHT
AST SERPL-CCNC: 16 U/L (ref 10–40)
AST SERPL-CCNC: 22 U/L (ref 10–40)
BASO STIPL BLD QL SMEAR: ABNORMAL
BASO STIPL BLD QL SMEAR: ABNORMAL
BASOPHILS # BLD AUTO: 0.03 K/UL (ref 0–0.2)
BASOPHILS # BLD AUTO: 0.03 K/UL (ref 0–0.2)
BASOPHILS NFR BLD: 0.1 % (ref 0–1.9)
BASOPHILS NFR BLD: 0.2 % (ref 0–1.9)
BILIRUB SERPL-MCNC: 0.4 MG/DL (ref 0.1–1)
BILIRUB SERPL-MCNC: 0.5 MG/DL (ref 0.1–1)
BUN SERPL-MCNC: 23 MG/DL (ref 6–20)
BUN SERPL-MCNC: 23 MG/DL (ref 6–20)
CALCIUM SERPL-MCNC: 8 MG/DL (ref 8.7–10.5)
CALCIUM SERPL-MCNC: 8 MG/DL (ref 8.7–10.5)
CHLORIDE SERPL-SCNC: 106 MMOL/L (ref 95–110)
CHLORIDE SERPL-SCNC: 108 MMOL/L (ref 95–110)
CO2 SERPL-SCNC: 20 MMOL/L (ref 23–29)
CO2 SERPL-SCNC: 20 MMOL/L (ref 23–29)
CREAT SERPL-MCNC: 1.6 MG/DL (ref 0.5–1.4)
CREAT SERPL-MCNC: 1.7 MG/DL (ref 0.5–1.4)
CRP SERPL-MCNC: 124.5 MG/L (ref 0–8.2)
DIFFERENTIAL METHOD: ABNORMAL
DIFFERENTIAL METHOD: ABNORMAL
DOHLE BOD BLD QL SMEAR: PRESENT
EOSINOPHIL # BLD AUTO: 0.3 K/UL (ref 0–0.5)
EOSINOPHIL # BLD AUTO: 0.4 K/UL (ref 0–0.5)
EOSINOPHIL NFR BLD: 1.4 % (ref 0–8)
EOSINOPHIL NFR BLD: 1.8 % (ref 0–8)
ERYTHROCYTE [DISTWIDTH] IN BLOOD BY AUTOMATED COUNT: 19.4 % (ref 11.5–14.5)
ERYTHROCYTE [DISTWIDTH] IN BLOOD BY AUTOMATED COUNT: 19.6 % (ref 11.5–14.5)
EST. GFR  (AFRICAN AMERICAN): 38 ML/MIN/1.73 M^2
EST. GFR  (AFRICAN AMERICAN): 40.9 ML/MIN/1.73 M^2
EST. GFR  (NON AFRICAN AMERICAN): 33 ML/MIN/1.73 M^2
EST. GFR  (NON AFRICAN AMERICAN): 35.5 ML/MIN/1.73 M^2
GIANT PLATELETS BLD QL SMEAR: PRESENT
GIANT PLATELETS BLD QL SMEAR: PRESENT
GLUCOSE SERPL-MCNC: 156 MG/DL (ref 70–110)
GLUCOSE SERPL-MCNC: 167 MG/DL (ref 70–110)
HCT VFR BLD AUTO: 22.1 % (ref 37–48.5)
HCT VFR BLD AUTO: 23 % (ref 37–48.5)
HGB BLD-MCNC: 6.7 G/DL (ref 12–16)
HGB BLD-MCNC: 6.9 G/DL (ref 12–16)
HYPOCHROMIA BLD QL SMEAR: ABNORMAL
HYPOCHROMIA BLD QL SMEAR: ABNORMAL
IMM GRANULOCYTES # BLD AUTO: 0.29 K/UL (ref 0–0.04)
IMM GRANULOCYTES # BLD AUTO: 0.38 K/UL (ref 0–0.04)
IMM GRANULOCYTES NFR BLD AUTO: 1.4 % (ref 0–0.5)
IMM GRANULOCYTES NFR BLD AUTO: 2 % (ref 0–0.5)
LYMPHOCYTES # BLD AUTO: 1 K/UL (ref 1–4.8)
LYMPHOCYTES # BLD AUTO: 1 K/UL (ref 1–4.8)
LYMPHOCYTES NFR BLD: 4.7 % (ref 18–48)
LYMPHOCYTES NFR BLD: 5.1 % (ref 18–48)
MAGNESIUM SERPL-MCNC: 2.2 MG/DL (ref 1.6–2.6)
MCH RBC QN AUTO: 27.5 PG (ref 27–31)
MCH RBC QN AUTO: 27.5 PG (ref 27–31)
MCHC RBC AUTO-ENTMCNC: 30 G/DL (ref 32–36)
MCHC RBC AUTO-ENTMCNC: 30.3 G/DL (ref 32–36)
MCV RBC AUTO: 91 FL (ref 82–98)
MCV RBC AUTO: 92 FL (ref 82–98)
MONOCYTES # BLD AUTO: 1.7 K/UL (ref 0.3–1)
MONOCYTES # BLD AUTO: 1.8 K/UL (ref 0.3–1)
MONOCYTES NFR BLD: 8.7 % (ref 4–15)
MONOCYTES NFR BLD: 8.7 % (ref 4–15)
NEUTROPHILS # BLD AUTO: 15.8 K/UL (ref 1.8–7.7)
NEUTROPHILS # BLD AUTO: 17.3 K/UL (ref 1.8–7.7)
NEUTROPHILS NFR BLD: 82.2 % (ref 38–73)
NEUTROPHILS NFR BLD: 83.7 % (ref 38–73)
NRBC BLD-RTO: 2 /100 WBC
NRBC BLD-RTO: 3 /100 WBC
OVALOCYTES BLD QL SMEAR: ABNORMAL
OVALOCYTES BLD QL SMEAR: ABNORMAL
PHOSPHATE SERPL-MCNC: 3.9 MG/DL (ref 2.7–4.5)
PLATELET # BLD AUTO: 507 K/UL (ref 150–350)
PLATELET # BLD AUTO: 546 K/UL (ref 150–350)
PLATELET BLD QL SMEAR: ABNORMAL
PLATELET BLD QL SMEAR: ABNORMAL
PMV BLD AUTO: 10.1 FL (ref 9.2–12.9)
PMV BLD AUTO: 10.3 FL (ref 9.2–12.9)
POCT GLUCOSE: 140 MG/DL (ref 70–110)
POCT GLUCOSE: 154 MG/DL (ref 70–110)
POCT GLUCOSE: 160 MG/DL (ref 70–110)
POCT GLUCOSE: 166 MG/DL (ref 70–110)
POCT GLUCOSE: 171 MG/DL (ref 70–110)
POIKILOCYTOSIS BLD QL SMEAR: SLIGHT
POIKILOCYTOSIS BLD QL SMEAR: SLIGHT
POLYCHROMASIA BLD QL SMEAR: ABNORMAL
POLYCHROMASIA BLD QL SMEAR: ABNORMAL
POTASSIUM SERPL-SCNC: 3.9 MMOL/L (ref 3.5–5.1)
POTASSIUM SERPL-SCNC: 4.1 MMOL/L (ref 3.5–5.1)
PROT SERPL-MCNC: 6.2 G/DL (ref 6–8.4)
PROT SERPL-MCNC: 6.3 G/DL (ref 6–8.4)
RBC # BLD AUTO: 2.44 M/UL (ref 4–5.4)
RBC # BLD AUTO: 2.51 M/UL (ref 4–5.4)
SODIUM SERPL-SCNC: 135 MMOL/L (ref 136–145)
SODIUM SERPL-SCNC: 138 MMOL/L (ref 136–145)
TOXIC GRANULES BLD QL SMEAR: PRESENT
TOXIC GRANULES BLD QL SMEAR: PRESENT
WBC # BLD AUTO: 19.16 K/UL (ref 3.9–12.7)
WBC # BLD AUTO: 20.7 K/UL (ref 3.9–12.7)

## 2020-09-25 PROCEDURE — 63600175 PHARM REV CODE 636 W HCPCS: Performed by: STUDENT IN AN ORGANIZED HEALTH CARE EDUCATION/TRAINING PROGRAM

## 2020-09-25 PROCEDURE — 84100 ASSAY OF PHOSPHORUS: CPT

## 2020-09-25 PROCEDURE — 25000003 PHARM REV CODE 250: Performed by: NURSE PRACTITIONER

## 2020-09-25 PROCEDURE — 25000003 PHARM REV CODE 250: Performed by: STUDENT IN AN ORGANIZED HEALTH CARE EDUCATION/TRAINING PROGRAM

## 2020-09-25 PROCEDURE — 87040 BLOOD CULTURE FOR BACTERIA: CPT | Mod: 59

## 2020-09-25 PROCEDURE — 87106 FUNGI IDENTIFICATION YEAST: CPT

## 2020-09-25 PROCEDURE — 83735 ASSAY OF MAGNESIUM: CPT

## 2020-09-25 PROCEDURE — C9113 INJ PANTOPRAZOLE SODIUM, VIA: HCPCS | Performed by: STUDENT IN AN ORGANIZED HEALTH CARE EDUCATION/TRAINING PROGRAM

## 2020-09-25 PROCEDURE — A4216 STERILE WATER/SALINE, 10 ML: HCPCS | Performed by: STUDENT IN AN ORGANIZED HEALTH CARE EDUCATION/TRAINING PROGRAM

## 2020-09-25 PROCEDURE — 97535 SELF CARE MNGMENT TRAINING: CPT

## 2020-09-25 PROCEDURE — 63600175 PHARM REV CODE 636 W HCPCS: Performed by: NURSE PRACTITIONER

## 2020-09-25 PROCEDURE — 80053 COMPREHEN METABOLIC PANEL: CPT

## 2020-09-25 PROCEDURE — 87186 SC STD MICRODIL/AGAR DIL: CPT

## 2020-09-25 PROCEDURE — 85025 COMPLETE CBC W/AUTO DIFF WBC: CPT | Mod: 91

## 2020-09-25 PROCEDURE — 11000001 HC ACUTE MED/SURG PRIVATE ROOM

## 2020-09-25 PROCEDURE — 94761 N-INVAS EAR/PLS OXIMETRY MLT: CPT

## 2020-09-25 PROCEDURE — 94770 HC EXHALED C02 TEST: CPT

## 2020-09-25 PROCEDURE — A4217 STERILE WATER/SALINE, 500 ML: HCPCS | Performed by: NURSE PRACTITIONER

## 2020-09-25 PROCEDURE — 97530 THERAPEUTIC ACTIVITIES: CPT | Mod: CQ

## 2020-09-25 PROCEDURE — B4185 PARENTERAL SOL 10 GM LIPIDS: HCPCS | Performed by: NURSE PRACTITIONER

## 2020-09-25 PROCEDURE — 80053 COMPREHEN METABOLIC PANEL: CPT | Mod: 91

## 2020-09-25 PROCEDURE — 99232 SBSQ HOSP IP/OBS MODERATE 35: CPT | Mod: ,,, | Performed by: NURSE PRACTITIONER

## 2020-09-25 PROCEDURE — 36415 COLL VENOUS BLD VENIPUNCTURE: CPT

## 2020-09-25 PROCEDURE — 86140 C-REACTIVE PROTEIN: CPT

## 2020-09-25 PROCEDURE — 97116 GAIT TRAINING THERAPY: CPT | Mod: CQ

## 2020-09-25 PROCEDURE — 99232 PR SUBSEQUENT HOSPITAL CARE,LEVL II: ICD-10-PCS | Mod: ,,, | Performed by: NURSE PRACTITIONER

## 2020-09-25 PROCEDURE — 99900035 HC TECH TIME PER 15 MIN (STAT)

## 2020-09-25 RX ORDER — OXYCODONE AND ACETAMINOPHEN 5; 325 MG/1; MG/1
1 TABLET ORAL EVERY 4 HOURS PRN
Status: DISCONTINUED | OUTPATIENT
Start: 2020-09-25 | End: 2020-10-13 | Stop reason: HOSPADM

## 2020-09-25 RX ORDER — SODIUM CHLORIDE 9 MG/ML
INJECTION, SOLUTION INTRAVENOUS CONTINUOUS
Status: DISCONTINUED | OUTPATIENT
Start: 2020-09-25 | End: 2020-09-29

## 2020-09-25 RX ORDER — OXYCODONE AND ACETAMINOPHEN 10; 325 MG/1; MG/1
1 TABLET ORAL EVERY 4 HOURS PRN
Status: DISCONTINUED | OUTPATIENT
Start: 2020-09-25 | End: 2020-10-13 | Stop reason: HOSPADM

## 2020-09-25 RX ORDER — HYDROMORPHONE HYDROCHLORIDE 1 MG/ML
0.5 INJECTION, SOLUTION INTRAMUSCULAR; INTRAVENOUS; SUBCUTANEOUS EVERY 6 HOURS PRN
Status: DISCONTINUED | OUTPATIENT
Start: 2020-09-25 | End: 2020-10-13 | Stop reason: HOSPADM

## 2020-09-25 RX ADMIN — SODIUM CHLORIDE, SODIUM LACTATE, POTASSIUM CHLORIDE, AND CALCIUM CHLORIDE 1000 ML: .6; .31; .03; .02 INJECTION, SOLUTION INTRAVENOUS at 09:09

## 2020-09-25 RX ADMIN — CYANOCOBALAMIN 100 MCG: 1000 INJECTION, SOLUTION INTRAMUSCULAR at 08:09

## 2020-09-25 RX ADMIN — Medication 10 ML: at 05:09

## 2020-09-25 RX ADMIN — PANTOPRAZOLE SODIUM 40 MG: 40 INJECTION, POWDER, LYOPHILIZED, FOR SOLUTION INTRAVENOUS at 11:09

## 2020-09-25 RX ADMIN — METOPROLOL TARTRATE 25 MG: 25 TABLET, FILM COATED ORAL at 08:09

## 2020-09-25 RX ADMIN — PIPERACILLIN SODIUM AND TAZOBACTAM SODIUM 4.5 G: 4; .5 INJECTION, POWDER, LYOPHILIZED, FOR SOLUTION INTRAVENOUS at 03:09

## 2020-09-25 RX ADMIN — PIPERACILLIN SODIUM AND TAZOBACTAM SODIUM 4.5 G: 4; .5 INJECTION, POWDER, LYOPHILIZED, FOR SOLUTION INTRAVENOUS at 11:09

## 2020-09-25 RX ADMIN — INSULIN ASPART 2 UNITS: 100 INJECTION, SOLUTION INTRAVENOUS; SUBCUTANEOUS at 11:09

## 2020-09-25 RX ADMIN — Medication 10 ML: at 06:09

## 2020-09-25 RX ADMIN — Medication 10 ML: at 12:09

## 2020-09-25 RX ADMIN — ACETAMINOPHEN 650 MG: 325 TABLET ORAL at 11:09

## 2020-09-25 RX ADMIN — EPOETIN ALFA-EPBX 20000 UNITS: 10000 INJECTION, SOLUTION INTRAVENOUS; SUBCUTANEOUS at 05:09

## 2020-09-25 RX ADMIN — Medication 10 ML: at 11:09

## 2020-09-25 RX ADMIN — INSULIN ASPART 1 UNITS: 100 INJECTION, SOLUTION INTRAVENOUS; SUBCUTANEOUS at 11:09

## 2020-09-25 RX ADMIN — SODIUM CHLORIDE: 0.9 INJECTION, SOLUTION INTRAVENOUS at 11:09

## 2020-09-25 RX ADMIN — ACETAMINOPHEN 650 MG: 325 TABLET ORAL at 08:09

## 2020-09-25 RX ADMIN — MAGNESIUM SULFATE HEPTAHYDRATE: 500 INJECTION, SOLUTION INTRAMUSCULAR; INTRAVENOUS at 10:09

## 2020-09-25 RX ADMIN — ENOXAPARIN SODIUM 40 MG: 40 INJECTION SUBCUTANEOUS at 05:09

## 2020-09-25 RX ADMIN — I.V. FAT EMULSION 250 ML: 20 EMULSION INTRAVENOUS at 09:09

## 2020-09-25 RX ADMIN — PIPERACILLIN SODIUM AND TAZOBACTAM SODIUM 4.5 G: 4; .5 INJECTION, POWDER, LYOPHILIZED, FOR SOLUTION INTRAVENOUS at 08:09

## 2020-09-25 RX ADMIN — INSULIN ASPART 1 UNITS: 100 INJECTION, SOLUTION INTRAVENOUS; SUBCUTANEOUS at 06:09

## 2020-09-25 RX ADMIN — INSULIN ASPART 2 UNITS: 100 INJECTION, SOLUTION INTRAVENOUS; SUBCUTANEOUS at 04:09

## 2020-09-25 NOTE — ASSESSMENT & PLAN NOTE
Chronic, controlled.  Latest blood pressure and vitals reviewed-   Temp:  [99 °F (37.2 °C)-100.9 °F (38.3 °C)]   Pulse:  []   Resp:  [16-29]   BP: (120-138)/(59-88)   SpO2:  [95 %-99 %] .   Home meds for hypertension were reviewed and noted below. Hospital anti-hypertensive changes were made as shown below.  Hospital Medications             labetalol 20 mg/4 mL (5 mg/mL) IV syring 10 mg, Intravenous, Every 4 hours PRN, 1. Confirm patient on continuous cardiac monitor (obtain order if needed)<BR>2. Obstetrics is exempt from continuous cardiac monitoring. <BR>3. Monitor ECG rhythm throughout administrations noting rhythm and changes<BR>4. Monitor BP and HR pre-administration, post-administration, and every 30 minutes x1 after dose given<BR>5. Administer Labetalol at rate no faster than 10mg over 1 minute.<BR>6. Contraindications: HR &lt;50, SBP&lt;100, second or third degree AV block. If assessed, hold dose and call provider.        Will utilize p.r.n. blood pressure medication only if patient's blood pressure greater than  180/110 and she develops symptoms such as worsening chest pain or shortness of breath.

## 2020-09-25 NOTE — PLAN OF CARE
Patient AAOX4, call light and belongings in reach, siderails up x2, scds on and operating.  Patient abdominal surgical site with wound vac applied this afternoon no output yet, Grenade drain insertion site dressing changed this shift secondary to being soiled, ileostomy and colostomy bags changed this shift.  Patient T101.6 this shift, tylenol administered as per orders, team notified.  Patient tolerating diet with no complaints of n/v.  I&Os charted, patient tachy 108-115s, denies sob, denies pain this shift.  Patient remains free from falls and safety maintained.

## 2020-09-25 NOTE — NURSING
1145am paged DR Reynolds's team to notify of fever and mews score, OC Hammad Bocanegra notified.     1348 paged team again.  OC and rapid aware of Temp.

## 2020-09-25 NOTE — ASSESSMENT & PLAN NOTE
S/p urinary diversion with colon conduit 9/11   S/p emergent ex-lap 9/17, colo-colic anastomosis intact, bowel perforation found, underwent resection of right colon, remaining transverses colon, and proximal descending colon, ileostomy creation, Charlotte's pouch created     - CRS primary  - regular diet per Primary  - continue PT/OT, OOBTC as tolerated, encourage ambulation   - Wound care ostomy consulted for assistance with ostomy teaching   - patient is a [place]; Hgb decreased to 6.7, management per primary and Heme-Onc (on EPO, B12, and iron)  - Drains: maintain, IR drain (Cr 0.7), ureteral stents, and red rubber  - Prophylaxis: IS, SCDs, GI ppx   Sees Dr. Tovar, doing better on various ointments for last 6 months.

## 2020-09-25 NOTE — PLAN OF CARE
Problem: Physical Therapy Goal  Goal: Physical Therapy Goal  Description: Patient re-evaluated 20 s/p further surgery.  Goals to be met by: 10/5/2020    Patient will increase functional independence with mobility by performin. Supine to sit with Set-up [place]  2. Sit to supine with Set-up [place]  3. Sit to stand transfer with Supervision  4. Bed to chair transfer with Supervision using Rolling Walker  5. Gait  x 120 feet with Stand-by Assistance using Rolling Walker.   6. Ascend/descend 3 stair with left Handrails Stand-by Assistance  Outcome: Ongoing, Progressing

## 2020-09-25 NOTE — PT/OT/SLP PROGRESS
"Physical Therapy Treatment    Patient Name:  Edita Riley   MRN:  [id number]    Recommendations:     Discharge Recommendations:  nursing facility, skilled   Discharge Equipment Recommendations: bath bench, walker, rolling, wheelchair   Barriers to discharge: increased assist level for all mobility and self care    Assessment:     Edita Riley is a 55 y.o. female admitted with a medical diagnosis of Bilateral ureteral obstruction.  She presents with the following impairments/functional limitations:  impaired endurance, weakness, impaired functional mobilty, gait instability, impaired balance, pain. Today patient was able to ambulate for apprx 15 ft, 10 ft, 45 ft with RW and CGA with chair f/u. Ostomy bags leaking, assisted nurse with supplies and clean up while patient sat patiently on toilet. Very motivated, will benefit from SNF stay    Rehab Prognosis: Good; patient would benefit from acute skilled PT services to address these deficits and reach maximum level of function.    Recent Surgery: Procedure(s):  COLECTOMY, RIGHT Extended  CREATION, ILEOSTOMY 8 Days Post-Op    Plan:     During this hospitalization, patient to be seen 4 x/week to address the identified rehab impairments via gait training, therapeutic activities, therapeutic exercises and progress toward the following goals:    · Plan of Care Expires:  10/21/20    Subjective     Chief Complaint: fatigue  Patient/Family Comments/goals: "It was so hot in that bathroom".   Pain/Comfort:  · Pain Rating 1: 0/10  · Location 1: abdomen  · Pain Addressed 1: Pre-medicate for activity, Reposition      Objective:     Communicated with nurse prior to session.  Patient found up in chair with colostomy, telemetry, nephrostomy, PCA, peripheral IV upon PT entry to room.     General Precautions: Standard, fall   Orthopedic Precautions:N/A   Braces: N/A     Functional Mobility:  · Transfers:     · Sit to Stand:  contact guard assistance and minimum " assistance with rolling walker  · Gait: 15 ft, 10 ft then additional 45 ft with RW and CGA with chair f/u for frequent rests, SOB but sats 99%, decreased endurance.       AM-PAC 6 CLICK MOBILITY  Turning over in bed (including adjusting bedclothes, sheets and blankets)?: 3  Sitting down on and standing up from a chair with arms (e.g., wheelchair, bedside commode, etc.): 3  Moving from lying on back to sitting on the side of the bed?: 3  Moving to and from a bed to a chair (including a wheelchair)?: 3  Need to walk in hospital room?: 3  Climbing 3-5 steps with a railing?: 2  Basic Mobility Total Score: 17       Therapeutic Activities and Exercises:   white board updated  Making good gains, had to return to room after 3 gt trial as bags continued to leak onto floor. Patient did want to continue walking.     Patient left up in chair with all lines intact, call button in reach and nurse present..    GOALS:   Multidisciplinary Problems     Physical Therapy Goals        Problem: Physical Therapy Goal    Goal Priority Disciplines Outcome Goal Variances Interventions   Physical Therapy Goal     PT, PT/OT Ongoing, Progressing     Description: Patient re-evaluated 20 s/p further surgery.  Goals to be met by: 10/5/2020    Patient will increase functional independence with mobility by performin. Supine to sit with Set-up [place]  2. Sit to supine with Set-up [place]  3. Sit to stand transfer with Supervision  4. Bed to chair transfer with Supervision using Rolling Walker  5. Gait  x 120 feet with Stand-by Assistance using Rolling Walker.   6. Ascend/descend 3 stair with left Handrails Stand-by Assistance                   Time Tracking:     PT Received On: 20  PT Start Time: 1015     PT Stop Time: 1113  PT Total Time (min): 58 min     Billable Minutes: Gait Training 38 and Therapeutic Activity 20    Treatment Type: Treatment  PT/PTA: PTA     PTA Visit Number: 3     Genevieve Boston PTA  2020

## 2020-09-25 NOTE — CARE UPDATE
"RAPID RESPONSE NURSE PROACTIVE ROUNDING NOTE     Time of Visit: 1527    Admit Date: 2020  LOS: 14  Code Status: Full Code   Date of Visit: 2020  : 1965  Age: 55 y.o.  Sex: female  Race: Black or   Bed: [address] [place] [address] A:   MRN: [id number]  Was the patient discharged from an ICU this admission? no   Was the patient discharged from a PACU within last 24 hours?  no  Did the patient receive conscious sedation/general anesthesia in last 24 hours?  no  Was the patient in the ED within the past 24 hours?  no  Was the patient started on NIPPV within the past 24 hours?  no  Attending Physician: Hammad Reynolds MD  Primary Service: Networked reference to record PCT     ASSESSMENT     Notified by Epic patient alert.  Reason for alert: Elevated MEWS    Diagnosis: Bilateral ureteral obstruction    Abnormal Vital Signs: /80   Pulse 109   Temp 100.2 °F (37.9 °C)   Resp (!) 24   Ht 5' 9" (1.753 m)   Wt 88.9 kg (196 lb)   LMP 2014 (Approximate)   SpO2 98%   Breastfeeding No   BMI 28.94 kg/m²      Clinical Issues: Circulatory    Patient  has a past medical history of Abnormal mammogram, Anxiety, Cervical cancer, Chronic back pain, Depression, Diarrhea due to malabsorption, Fibromyalgia, Generalized abdominal pain, GERD (gastroesophageal reflux disease), Hiatal hernia, History of cervical cancer, History of cervical cancer, psychiatric care, Hypertension, Hypomagnesemia, Lactose intolerance, Metastatic squamous cell carcinoma to lymph node, Nephrostomy tube displaced, 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 drug, Osteoarthritis of back, Psychiatric problem, and Stroke.      Patient found alert, in bed. Limited verbal interaction, which is baseline per primary RN. Denies pain. Ileostomy output moderate. Urine noted in collection bag (clear yellow).      INTERVENTIONS/ RECOMMENDATIONS     Continue to monitor patient for signs of decompensation.     Discussed plan of care with RN, " Anastacia.    PHYSICIAN ESCALATION     Yes/No  no    Orders received and case discussed with NA.    Disposition: Remain in room 524.    FOLLOW-UP     Call back the Rapid Response Nurse, Adam Marcos RN at [phone] for additional questions or concerns.

## 2020-09-25 NOTE — PT/OT/SLP PROGRESS
Occupational Therapy   Treatment    Name: Edita Riley  MRN: [id number]  Admitting Diagnosis:  Bilateral ureteral obstruction  8 Days Post-Op    Recommendations:     Discharge Recommendations: nursing facility, skilled  Discharge Equipment Recommendations:  bath bench, walker, rolling, wheelchair  Barriers to discharge:  None(increased level of assistance)    Assessment:     Edita Riley is a 55 y.o. female with a medical diagnosis of Bilateral ureteral obstruction.  She presents with the following Performance deficits affecting function are weakness, impaired endurance, impaired self care skills, impaired functional mobilty, gait instability, impaired balance, decreased lower extremity function, decreased safety awareness, pain.     Pt  motivated for therapy and tolerated session well secondary to weakness. Pt required CGA in transfers and ambulation due to decreased activity tolerance. Pt ambulated ~30ft CGA using RW to increase activity tolerance required for adls and functional mobility. Pt ostomy bags leaking while on toilet, assisted nurse with supplies and clean up. Pt would benefit from SNF following d/c to return to OF and decrease burden of care.     Rehab Prognosis:  Good; patient would benefit from acute skilled OT services to address these deficits and reach maximum level of function.       Plan:     Patient to be seen 4 x/week to address the above listed problems via self-care/home management, therapeutic activities, therapeutic exercises  · Plan of Care Expires: 10/11/20  · Plan of Care Reviewed with: patient    Subjective     Pain/Comfort:  · Pain Rating 1: 0/10  · Location 1: abdomen  · Pain Addressed 1: Pre-medicate for activity, Reposition    Objective:     Communicated with: RN prior to session.  Patient found up in chair with telemetry, colostomy, nephrostomy, PCA, peripheral IV upon OT entry to room.    General Precautions: Standard, fall   Orthopedic Precautions:N/A    Braces: N/A     Occupational Performance:     Bed Mobility:    · Not tested     Functional Mobility/Transfers:  · Patient completed Sit <> Stand Transfer with contact guard assistance  with  rolling walker   · Patient completed Toilet Transfer Step Transfer technique with contact guard assistance with  rolling walker  · Functional Mobility: Pt  motivated for therapy and tolerated session well secondary to weakness. Pt required CGA in transfers and ambulation due to decreased activity tolerance. Pt ambulated ~30ft CGA using RW to increase activity tolerance required for adls and functional mobility. Pt ostomy bags leaking while on toilet, assisted nurse with supplies and clean up. Pt would benefit from SNF following d/c to return to PLOF and decrease burden of care.     Activities of Daily Living:  · Bathing: minimum assistance using wipes seated on toilet  · Upper Body Dressing: minimum assistance eliza gown  · Lower Body Dressing: maximal assistance eliza socks  · Toileting: contact guard assistance for perineal hygiene      [facility] 6 Click ADL: 18    Treatment & Education:  - OT/POC-  - Importance of mobility to maximize recovery.  - safety w/ functional mobility; hand placement to ensure safe transfers to various surfaces in prep for ADLs  - Reviewed gait sequence and RW management via verbalization and demonstration   - encouraged to ambulate within household environment at least every hour to hour 1/2      Patient left supine with all lines intact, bed alarm on and RN notifiedEducation:      GOALS:   Multidisciplinary Problems     Occupational Therapy Goals        Problem: Occupational Therapy Goal    Goal Priority Disciplines Outcome Interventions   Occupational Therapy Goal     OT, PT/OT Ongoing, Progressing    Description: Goals to be met by: 9/26/2020    Patient will increase functional independence with ADLs by performing:    LE Dressing with Supervision.  Grooming while standing with Supervision.  Toileting  from toilet with Supervision for hygiene and clothing management.   Supine to sit with Supervision.  Toilet transfer to toilet with Supervision.                     Time Tracking:     OT Date of Treatment: 09/25/20  OT Start Time: 1012  OT Stop Time: 1050  OT Total Time (min): 38 min    Billable Minutes:Self Care/Home Management 38    Danielle Funes OT  9/25/2020

## 2020-09-25 NOTE — SUBJECTIVE & OBJECTIVE
Subjective:     Interval History: over 2L of ostomy output.  Tolerating diet, denies n/v    Post-Op Info:  Procedure(s):  COLECTOMY, RIGHT Extended  CREATION, ILEOSTOMY   8 Days Post-Op      Medications:  Continuous Infusions:   sodium chloride 0.9% 40 mL/hr at 09/25/20 1113    TPN ADULT CENTRAL LINE CUSTOM 60 mL/hr at 09/24/20 2213     Scheduled Meds:   cyanocobalamin  100 mcg Intramuscular Daily    [START ON 9/26/2020] cyanocobalamin  100 mcg Intramuscular Weekly    enoxaparin  40 mg Subcutaneous Q24H    epoetin yecenia-epbx  20,000 Units Subcutaneous Q48H    lactated ringers  1,000 mL Intravenous Once    metoprolol tartrate  25 mg Oral BID    pantoprazole  40 mg Intravenous Daily    piperacillin-tazobactam (ZOSYN) IVPB  4.5 g Intravenous Q8H    sodium chloride 0.9%  10 mL Intravenous Q6H     PRN Meds:   acetaminophen    dextrose 50%    glucagon (human recombinant)    HYDROmorphone    insulin aspart U-100    iohexol    iohexol    labetalol    ondansetron    oxyCODONE-acetaminophen    oxyCODONE-acetaminophen    sodium chloride 0.9%    sodium chloride 0.9%    sodium chloride 0.9%        Objective:     Vital Signs (Most Recent):  Temp: 99.7 °F (37.6 °C) (09/25/20 0400)  Pulse: 109 (09/25/20 0900)  Resp: 20 (09/25/20 0900)  BP: 135/84 (09/25/20 0900)  SpO2: 95 % (09/25/20 0400) Vital Signs (24h Range):  Temp:  [99 °F (37.2 °C)-100.9 °F (38.3 °C)] 99.7 °F (37.6 °C)  Pulse:  [] 109  Resp:  [16-29] 20  SpO2:  [95 %-99 %] 95 %  BP: (120-138)/(59-88) 135/84     Intake/Output - Last 3 Shifts       09/23 0700 - 09/24 0659 09/24 0700 - 09/25 0659 09/25 0700 - 09/26 0659    P.O. 0 600 50    Other       IV Piggyback  200     TPN  201     Total Intake(mL/kg) 0 (0) 1001 (11.3) 50 (0.6)    Urine (mL/kg/hr) 1225 (0.6) 925 (0.4) 200 (0.5)    Emesis/NG output 0      Drains 210 40 0    Stool 1825 2575 250    Total Output 6349 7479 293    Net -3260 -2539 -400           Stool Occurrence 2 x      Emesis  Occurrence 1 x            Physical Exam  Vitals signs and nursing note reviewed.   Constitutional:       Appearance: She is well-developed. She is not ill-appearing.   HENT:      Head: Normocephalic.   Eyes:      Pupils: Pupils are equal, round, and reactive to light.   Cardiovascular:      Rate and Rhythm: Normal rate and regular rhythm.      Heart sounds: Normal heart sounds.   Pulmonary:      Effort: Pulmonary effort is normal. No respiratory distress.      Breath sounds: Normal breath sounds. No wheezing or rales.   Abdominal:      Palpations: Abdomen is soft. There is no mass.      Tenderness: There is no guarding or rebound.      Comments: abd inc line opened at bedside, purulent drainage  Ileostomy functional  Urostomy functional  IR drain with purulent drainage   Skin:     General: Skin is warm and dry.   Neurological:      Mental Status: She is alert and oriented to person, place, and time.   Psychiatric:         Behavior: Behavior normal.         Thought Content: Thought content normal.         Judgment: Judgment normal.           Significant Labs:  BMP (Last 3 Results):   Recent Labs   Lab 09/21/20  0422  09/23/20  0554 09/24/20  0354 09/25/20  0616   *   < > 203* 188* 156*   *   < > 142 141 138   K 3.4*   < > 3.1* 3.2* 4.1   *   < > 108 108 108   CO2 26   < > 23 21* 20*   BUN 21*   < > 14 17 23*   CREATININE 0.8   < > 0.9 1.0 1.6*   CALCIUM 8.3*   < > 7.8* 7.7* 8.0*   MG 1.8  --   --  2.1 2.2    < > = values in this interval not displayed.     CBC (Last 3 Results):   Recent Labs   Lab 09/23/20  0554 09/24/20  0354 09/25/20  0616   WBC 23.17* 16.84* 19.16*   RBC 2.51* 2.46* 2.51*   HGB 6.7* 6.7* 6.9*   HCT 22.9* 22.8* 23.0*   * 489* 546*   MCV 91 93 92   MCH 26.7* 27.2 27.5   MCHC 29.3* 29.4* 30.0*       Significant Diagnostics:  None

## 2020-09-25 NOTE — PLAN OF CARE
Pt resting in bed comfortably. Up in chair throughout the day. PICC line intact and infusing, free of infection and irritation. Bilateral neph tubes clamped. Ileostomy and urostomy draining to ostomy bags. Fall precautions maintained, no falls noted. Call light within reach, bed locked and in lowest position. Non-skid socks on while out of bed. Patient instructed to call for assistance. Skin integrity maintained as patient is independent with frequent repositioning. No complaints of pain or nausea. Progressing towards goals. Will continue to monitor and follow plan of care.

## 2020-09-25 NOTE — CARE UPDATE
Rapid Response Nurse Chart Check     Chart check completed, abnormal VS noted. Bedside RNAnastacia contacted. Antipyretic administered. Antibiotic therapy maintained. No type & screen due to Rastafarian prefrences. Instructed to call [phone] for further concerns or assistance.

## 2020-09-25 NOTE — ASSESSMENT & PLAN NOTE
Ms. Riley is a 55 y.o. female with b/l ureteral obstruction s/p transverse colon conduit on 9/11/2020. Extended R colectomy and ileostomy creation on 9/17.     - Diet regular  - Continue TPN  - Zosyn  -  IR for new fluid collection  - OOB, activity as tolerated  - PT/OT

## 2020-09-25 NOTE — PROGRESS NOTES
[facility]  Colorectal Surgery  Progress Note    Patient Name: Edita Riley  MRN: [id number]  Admission Date: 9/11/2020  Hospital Length of Stay: 14 days  Attending Physician: Hammad Reynolds MD    Subjective:     Interval History: over 2L of ostomy output.  Tolerating diet, denies n/v    Post-Op Info:  Procedure(s):  COLECTOMY, RIGHT Extended  CREATION, ILEOSTOMY   8 Days Post-Op      Medications:  Continuous Infusions:   sodium chloride 0.9% 40 mL/hr at 09/25/20 1113    TPN ADULT CENTRAL LINE CUSTOM 60 mL/hr at 09/24/20 2213     Scheduled Meds:   cyanocobalamin  100 mcg Intramuscular Daily    [START ON 9/26/2020] cyanocobalamin  100 mcg Intramuscular Weekly    enoxaparin  40 mg Subcutaneous Q24H    epoetin yecenia-epbx  20,000 Units Subcutaneous Q48H    lactated ringers  1,000 mL Intravenous Once    metoprolol tartrate  25 mg Oral BID    pantoprazole  40 mg Intravenous Daily    piperacillin-tazobactam (ZOSYN) IVPB  4.5 g Intravenous Q8H    sodium chloride 0.9%  10 mL Intravenous Q6H     PRN Meds:   acetaminophen    dextrose 50%    glucagon (human recombinant)    HYDROmorphone    insulin aspart U-100    iohexol    iohexol    labetalol    ondansetron    oxyCODONE-acetaminophen    oxyCODONE-acetaminophen    sodium chloride 0.9%    sodium chloride 0.9%    sodium chloride 0.9%        Objective:     Vital Signs (Most Recent):  Temp: 99.7 °F (37.6 °C) (09/25/20 0400)  Pulse: 109 (09/25/20 0900)  Resp: 20 (09/25/20 0900)  BP: 135/84 (09/25/20 0900)  SpO2: 95 % (09/25/20 0400) Vital Signs (24h Range):  Temp:  [99 °F (37.2 °C)-100.9 °F (38.3 °C)] 99.7 °F (37.6 °C)  Pulse:  [] 109  Resp:  [16-29] 20  SpO2:  [95 %-99 %] 95 %  BP: (120-138)/(59-88) 135/84     Intake/Output - Last 3 Shifts       09/23 0700 - 09/24 0659 09/24 0700 - 09/25 0659 09/25 0700 - 09/26 0659    P.O. 0 600 50    Other       IV Piggyback  200     TPN  201     Total Intake(mL/kg) 0 (0) 1001 (11.3) 50  (0.6)    Urine (mL/kg/hr) 1225 (0.6) 925 (0.4) 200 (0.5)    Emesis/NG output 0      Drains 210 40 0    Stool 1825 2575 250    Total Output 3260 3630 450    Net -2782 -4781 -400           Stool Occurrence 2 x      Emesis Occurrence 1 x            Physical Exam  Vitals signs and nursing note reviewed.   Constitutional:       Appearance: She is well-developed. She is not ill-appearing.   HENT:      Head: Normocephalic.   Eyes:      Pupils: Pupils are equal, round, and reactive to light.   Cardiovascular:      Rate and Rhythm: Normal rate and regular rhythm.      Heart sounds: Normal heart sounds.   Pulmonary:      Effort: Pulmonary effort is normal. No respiratory distress.      Breath sounds: Normal breath sounds. No wheezing or rales.   Abdominal:      Palpations: Abdomen is soft. There is no mass.      Tenderness: There is no guarding or rebound.      Comments: abd inc line opened at bedside, purulent drainage  Ileostomy functional  Urostomy functional  IR drain with purulent drainage   Skin:     General: Skin is warm and dry.   Neurological:      Mental Status: She is alert and oriented to person, place, and time.   Psychiatric:         Behavior: Behavior normal.         Thought Content: Thought content normal.         Judgment: Judgment normal.           Significant Labs:  BMP (Last 3 Results):   Recent Labs   Lab 09/21/20  0422  09/23/20  0554 09/24/20  0354 09/25/20  0616   *   < > 203* 188* 156*   *   < > 142 141 138   K 3.4*   < > 3.1* 3.2* 4.1   *   < > 108 108 108   CO2 26   < > 23 21* 20*   BUN 21*   < > 14 17 23*   CREATININE 0.8   < > 0.9 1.0 1.6*   CALCIUM 8.3*   < > 7.8* 7.7* 8.0*   MG 1.8  --   --  2.1 2.2    < > = values in this interval not displayed.     CBC (Last 3 Results):   Recent Labs   Lab 09/23/20  0554 09/24/20  0354 09/25/20  0616   WBC 23.17* 16.84* 19.16*   RBC 2.51* 2.46* 2.51*   HGB 6.7* 6.7* 6.9*   HCT 22.9* 22.8* 23.0*   * 489* 546*   MCV 91 93 92   MCH 26.7*  27.2 27.5   [facility] 29.3* 29.4* 30.0*       Significant Diagnostics:  None    Assessment/Plan:     * Bilateral ureteral obstruction  Ms. Riley is a 55 y.o. female with b/l ureteral obstruction s/p transverse colon conduit on 9/11/2020. Extended R colectomy and ileostomy creation on 9/17.     - Diet regular  - Continue TPN  - Zosyn  -  IR for new fluid collection  - OOB, activity as tolerated  - PT/OT        Wound infection after surgery  Wound now open, local wound care, poss wound vac  Continue zosyn    Hypokalemia  Patient has hypokalemia which is currently uncontrolled. Last electrolytes reviewed-   Recent Labs   Lab 09/24/20  0354 09/25/20  0616   K 3.2* 4.1   . Will replace potassium and monitor electrolytes closely.     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  At time of that procedure, I procured a vascularized segment of transverse colon to be used as the urinary conduit, as requested by Urology.  A primary end-to-end, hand-sewn colocolonic anastomosis was performed at that time to restore intestinal continuity once the segment of colon to be used for the conduit had been excluded.  The patient did well for the 1st few days postoperatively but then began to develop abdominal pain and distention consistent with an ileus.  This morning, there was small amount of pneumoperitoneum seen on a plain abdominal film, and later today she began to drain feculent appearing fluid through her Kurtis-Arias drain.  She had a normal white blood cell count and did not have peritonitis on examination, so a CT scan was performed, which demonstrated a large volume of free abdominal fluid as well as a large volume of  pneumoperitoneum, concerning for an anastomotic leak.  She is being brought back to the operating room for urgent exploratory laparotomy.    OR 9/17/2020    Cont TPN  Consult wound care for ileosotmy  Naseem vaughan, scd  Enc amb and IS  casandra smal amt of diet      Moderate malnutrition  Consult dietary  TPN  Monitor labs  Appreciate dietary inp;ut    Severe episode of recurrent major depressive disorder, with psychotic features  Cont home m eds    Impaired functional mobility, balance, gait, and endurance  PT/OT    Essential hypertension  Chronic, controlled.  Latest blood pressure and vitals reviewed-   Temp:  [99 °F (37.2 °C)-100.9 °F (38.3 °C)]   Pulse:  []   Resp:  [16-29]   BP: (120-138)/(59-88)   SpO2:  [95 %-99 %] .   Home meds for hypertension were reviewed and noted below. Hospital anti-hypertensive changes were made as shown below.  Hospital Medications             labetalol 20 mg/4 mL (5 mg/mL) IV syring 10 mg, Intravenous, Every 4 hours PRN, 1. Confirm patient on continuous cardiac monitor (obtain order if needed)<BR>2. Obstetrics is exempt from continuous cardiac monitoring. <BR>3. Monitor ECG rhythm throughout administrations noting rhythm and changes<BR>4. Monitor BP and HR pre-administration, post-administration, and every 30 minutes x1 after dose given<BR>5. Administer Labetalol at rate no faster than 10mg over 1 minute.<BR>6. Contraindications: HR &lt;50, SBP&lt;100, second or third degree AV block. If assessed, hold dose and call provider.        Will utilize p.r.n. blood pressure medication only if patient's blood pressure greater than  180/110 and she develops symptoms such as worsening chest pain or shortness of breath.          Andria Hua NP  Colorectal Surgery  [facility]

## 2020-09-25 NOTE — PROGRESS NOTES
Wound care follow up:     Wound vac initiated to midline abdominal incision today. Cleansed wound with sterile normal saline and applied skin prep to periwound. Applied white foam to tunneling and covered wound with black foam obtaining a good seal at 125 mmHg continuous setting. Patient tolerated wound vac application well.     Recommendations:   -Wound care team to change vac dressing to midline abdominal incision 2x/week- Tuesday and Friday. Apply skin prep to periwound. Place white foam in tunneling and cover wound beds with black foam maintaining seal @ 125 mm Hg continuous setting.     Nurse and MD team notified with recommendations.   Wound care team to continue to follow pt PRN [id number]    Upper midline incision      Lower midline incision

## 2020-09-25 NOTE — ASSESSMENT & PLAN NOTE
Patient has hypokalemia which is currently uncontrolled. Last electrolytes reviewed-   Recent Labs   Lab 09/24/20  0354 09/25/20  0616   K 3.2* 4.1   . Will replace potassium and monitor electrolytes closely.

## 2020-09-25 NOTE — ASSESSMENT & PLAN NOTE
-S/p urinary diversion with colon conduit 9/11   -S/p emergent ex-lap 9/17, colo-colic anastomosis intact, bowel perforation found, underwent resection of right colon, remaining transverses colon, and proximal descending colon, ileostomy creation, Charlotte's pouch created   -PCA pump infusing

## 2020-09-25 NOTE — PROGRESS NOTES
[facility]  Physical Medicine & Rehab  Progress Note    Patient Name: Edita Riley  MRN: [id number]  Admission Date: 9/11/2020  Length of Stay: 14 days  Attending Physician: Hammad Reynolds MD    Subjective:     Principal Problem:Bilateral ureteral obstruction    Hospital Course:   9/21: BM ModA-CGA. Sit to stand ModA x 2 ppl & RW. Ambulated 20 ft CGA & RW. UBD Jalil. LBD MaxA.   09/22/2020: Declined OT.   9/23: OT-Sit to stand CGA & RW. Ambulated 35', 50', 70' with CGA and RW, seated rest breaks between with chair follow for safety. [place] SV.     Interval History 9/25/2020:  Patient is seen for follow-up PM&R evaluation and recommendations: Participating w/ therapy. TPN and PCA still infusing.    HPI, Past Medical, Family, and Social History remains the same as documented in the initial encounter.    Scheduled Medications:    cyanocobalamin  100 mcg Intramuscular Daily    [START ON 9/26/2020] cyanocobalamin  100 mcg Intramuscular Weekly    enoxaparin  40 mg Subcutaneous Q24H    epoetin yecenia-epbx  20,000 Units Subcutaneous Q48H    fat emulsion 20%  250 mL Intravenous Daily    lactated ringers  1,000 mL Intravenous Once    metoprolol tartrate  25 mg Oral BID    pantoprazole  40 mg Intravenous Daily    piperacillin-tazobactam (ZOSYN) IVPB  4.5 g Intravenous Q8H    sodium chloride 0.9%  10 mL Intravenous Q6H       Diagnostic Results: Labs: Reviewed    PRN Medications: acetaminophen, dextrose 50%, glucagon (human recombinant), HYDROmorphone, insulin aspart U-100, iohexol, iohexol, labetalol, ondansetron, oxyCODONE-acetaminophen, oxyCODONE-acetaminophen, sodium chloride 0.9%, Flushing PICC Protocol **AND** sodium chloride 0.9% **AND** sodium chloride 0.9%, sodium chloride 0.9%    Review of Systems   Constitutional: Positive for activity change. Negative for fatigue and fever.   HENT: Negative for trouble swallowing and voice change.    Respiratory: Negative for cough and shortness of  breath.    Cardiovascular: Negative for chest pain and palpitations.   Gastrointestinal: Negative for abdominal pain, nausea and vomiting.   Musculoskeletal: Positive for gait problem. Negative for arthralgias.   Skin: Negative for color change and rash.   Neurological: Positive for weakness. Negative for numbness.     Objective:     Vital Signs (Most Recent):  Temp: 99.7 °F (37.6 °C) (09/25/20 0400)  Pulse: 109 (09/25/20 0900)  Resp: 20 (09/25/20 0900)  BP: 135/84 (09/25/20 0900)  SpO2: 95 % (09/25/20 0400)    Vital Signs (24h Range):  Temp:  [99 °F (37.2 °C)-100.9 °F (38.3 °C)] 99.7 °F (37.6 °C)  Pulse:  [] 109  Resp:  [16-29] 20  SpO2:  [95 %-99 %] 95 %  BP: (120-138)/(59-88) 135/84     Physical Exam  Vitals signs reviewed.   Constitutional:       General: She is not in acute distress.     Appearance: She is well-developed.      Comments: PCA pump infusing    HENT:      Head: Normocephalic and atraumatic.   Eyes:      General:         Right eye: No discharge.         Left eye: No discharge.   Neck:      Musculoskeletal: Neck supple.   Cardiovascular:      Rate and Rhythm: Normal rate.   Pulmonary:      Effort: Pulmonary effort is normal. No respiratory distress.      Comments: NC in place   Abdominal:      Palpations: Abdomen is soft.      Tenderness: There is no abdominal tenderness.      Comments: Ileostomy in place   TPN infusing   Wound vac in place  Genitourinary:     Comments: Nephrostomy tube in place  Urostomy in place  Musculoskeletal:         General: No deformity.   Skin:     General: Skin is warm and dry.   Neurological:      Mental Status: She is alert and oriented to person, place, and time.      Motor: Weakness present.      Comments: Follows commands    Psychiatric:         Behavior: Behavior normal.         Cognition and Memory: Cognition is not impaired.     Assessment/Plan:      * Bilateral ureteral obstruction       -S/p urinary diversion with colon conduit 9/11   -S/p emergent ex-lap  9/17, colo-colic anastomosis intact, bowel perforation found, underwent resection of right colon, remaining transverses colon, and proximal descending colon, ileostomy creation, Charlotte's pouch created   -PCA pump infusing   -wound vac and drain in place    Moderate malnutrition  -TPN infusing    Impaired functional mobility, balance, gait, and endurance  See hospital course for functional status.      Recommendations  -  Encourage mobility, OOB in chair, and early ambulation as appropriate  -  PT/OT evaluate and treat  -  Pain management  -  DVT prophylaxis if appropriate   -  Monitor for and prevent skin breakdown and pressure ulcers  · Early mobility, repositioning/weight shifting every 20-30 minutes when sitting, turn patient every 2 hours, proper mattress/overlay and chair cushioning, pressure relief/heel protector boots    Participating with therapy. Will follow progress and discuss with PM&R staff for post acute care recommendation.      Rylee Knapp NP  Department of Physical Medicine & Rehab   [facility]

## 2020-09-25 NOTE — SUBJECTIVE & OBJECTIVE
Interval History 9/25/2020:  Patient is seen for follow-up PM&R evaluation and recommendations: Participating w/ therapy. TPN and PCA still infusing.    HPI, Past Medical, Family, and Social History remains the same as documented in the initial encounter.    Scheduled Medications:    cyanocobalamin  100 mcg Intramuscular Daily    [START ON 9/26/2020] cyanocobalamin  100 mcg Intramuscular Weekly    enoxaparin  40 mg Subcutaneous Q24H    epoetin yecenia-epbx  20,000 Units Subcutaneous Q48H    fat emulsion 20%  250 mL Intravenous Daily    lactated ringers  1,000 mL Intravenous Once    metoprolol tartrate  25 mg Oral BID    pantoprazole  40 mg Intravenous Daily    piperacillin-tazobactam (ZOSYN) IVPB  4.5 g Intravenous Q8H    sodium chloride 0.9%  10 mL Intravenous Q6H       Diagnostic Results: Labs: Reviewed    PRN Medications: acetaminophen, dextrose 50%, glucagon (human recombinant), HYDROmorphone, insulin aspart U-100, iohexol, iohexol, labetalol, ondansetron, oxyCODONE-acetaminophen, oxyCODONE-acetaminophen, sodium chloride 0.9%, Flushing PICC Protocol **AND** sodium chloride 0.9% **AND** sodium chloride 0.9%, sodium chloride 0.9%    Review of Systems   Constitutional: Positive for activity change. Negative for fatigue and fever.   HENT: Negative for trouble swallowing and voice change.    Respiratory: Negative for cough and shortness of breath.    Cardiovascular: Negative for chest pain and palpitations.   Gastrointestinal: Negative for abdominal pain, nausea and vomiting.   Musculoskeletal: Positive for gait problem. Negative for arthralgias.   Skin: Negative for color change and rash.   Neurological: Positive for weakness. Negative for numbness.     Objective:     Vital Signs (Most Recent):  Temp: 99.7 °F (37.6 °C) (09/25/20 0400)  Pulse: 109 (09/25/20 0900)  Resp: 20 (09/25/20 0900)  BP: 135/84 (09/25/20 0900)  SpO2: 95 % (09/25/20 0400)    Vital Signs (24h Range):  Temp:  [99 °F (37.2 °C)-100.9 °F (38.3  °C)] 99.7 °F (37.6 °C)  Pulse:  [] 109  Resp:  [16-29] 20  SpO2:  [95 %-99 %] 95 %  BP: (120-138)/(59-88) 135/84     Physical Exam  Vitals signs reviewed.   Constitutional:       General: She is not in acute distress.     Appearance: She is well-developed.      Comments: PCA pump infusing    HENT:      Head: Normocephalic and atraumatic.   Eyes:      General:         Right eye: No discharge.         Left eye: No discharge.   Neck:      Musculoskeletal: Neck supple.   Cardiovascular:      Rate and Rhythm: Normal rate.   Pulmonary:      Effort: Pulmonary effort is normal. No respiratory distress.      Comments: NC in place   Abdominal:      Palpations: Abdomen is soft.      Tenderness: There is no abdominal tenderness.      Comments: Ileostomy in place   TPN infusing   Genitourinary:     Comments: Nephrostomy tube in place  Musculoskeletal:         General: No deformity.   Skin:     General: Skin is warm and dry.   Neurological:      Mental Status: She is alert and oriented to person, place, and time.      Motor: Weakness present.      Comments: Follows commands    Psychiatric:         Behavior: Behavior normal.         Cognition and Memory: Cognition is not impaired.       NEUROLOGICAL EXAMINATION:     MENTAL STATUS   Oriented to person, place, and time.

## 2020-09-25 NOTE — SUBJECTIVE & OBJECTIVE
Interval History: NAEON. ORESTES output 40 cc. Tolerating regular diet, pain controlled, ambulating. CRS opened midline wound at superior and inferior ends. Labs pending this AM.Abdominal culture growing bacteroides, Solobacterium.    Review of Systems  Objective:     Temp:  [99 °F (37.2 °C)-100.9 °F (38.3 °C)] 99.7 °F (37.6 °C)  Pulse:  [] 105  Resp:  [16-29] 16  SpO2:  [95 %-99 %] 95 %  BP: (120-157)/(59-88) 126/64     Body mass index is 28.94 kg/m².           Drains     Drain                 Nephrostomy 08/13/20 0805 Left 12 Fr. 42 days         Nephrostomy 08/13/20 0809 Right 12 Fr. 42 days         Urostomy 09/11/20 ileal conduit LLQ 14 days         Ileostomy 09/18/20 RLQ 7 days         Nephrostomy 09/18/20 0025 Left 7 days         Nephrostomy 09/18/20 0025 Right 7 days         Closed/Suction Drain 09/23/20 0720 Right Abdomen Bulb 10 Fr. 1 day                Physical Exam   Constitutional: No distress.   HENT:   Head: Normocephalic and atraumatic.   Eyes: Conjunctivae are normal. No scleral icterus.   Neck: Normal range of motion.   Cardiovascular: Normal rate.    Pulmonary/Chest: Effort normal. No respiratory distress.   Abdominal: Soft. She exhibits no distension. There is abdominal tenderness (appropriate). There is no rebound and no guarding.   Urostomy p/p/p draining clear yellow urine  Ureteral stents in place  Ileostomy with dark liquid fluid output  IR drain LLQ draining light yellow clear fluid  Midline incision with staples, opened at superior and inferior ends with gauze overlying opening, lightly stained SS   Musculoskeletal: Normal range of motion.      Comments: SCDs in place   Neurological: She is alert.   Skin: Skin is warm and dry. She is not diaphoretic.         Significant Labs:    BMP:  Recent Labs   Lab 09/22/20  0739 09/23/20  0554 09/24/20  0354   * 142 141   K 2.5* 3.1* 3.2*    108 108   CO2 26 23 21*   BUN 13 14 17   CREATININE 0.8 0.9 1.0   CALCIUM 8.1* 7.8* 7.7*       CBC:    Recent Labs   Lab 09/22/20  0739 09/23/20  0554 09/24/20  0354   WBC 24.21* 23.17* 16.84*   HGB 7.0* 6.7* 6.7*   HCT 23.5* 22.9* 22.8*   * 486* 489*       All pertinent labs results from the past 24 hours have been reviewed.    Significant Imaging:  All pertinent imaging results/findings from the past 24 hours have been reviewed.

## 2020-09-25 NOTE — PROGRESS NOTES
[facility]  Urology  Progress Note    Patient Name: Edita Riley  MRN: [id number]  Admission Date: 9/11/2020  Hospital Length of Stay: 14 days  Code Status: Full Code   Attending Provider: Hammad Reynolds MD   Primary Care Physician: Audrey Mustafa MD    Subjective:     HPI:  Edita Riley is a 55 y.o. female with a history of cervical cancer s/p pelvic radiation. She has since developed bilateral ureteral strictures and bilateral hydronephrosis R>L. She had a MAG3 scan confirmed presence of bilateral obstruction. She is s/p open transverse colon conduit urinary diversion on 9/11/2020.     Interval History: NAEON. ORESTES output 40 cc. Tolerating regular diet, pain controlled, ambulating. CRS opened midline wound at superior and inferior ends. Labs pending this AM.Abdominal culture growing bacteroides, Solobacterium.    Review of Systems  Objective:     Temp:  [99 °F (37.2 °C)-100.9 °F (38.3 °C)] 99.7 °F (37.6 °C)  Pulse:  [] 105  Resp:  [16-29] 16  SpO2:  [95 %-99 %] 95 %  BP: (120-157)/(59-88) 126/64     Body mass index is 28.94 kg/m².           Drains     Drain                 Nephrostomy 08/13/20 0805 Left 12 Fr. 42 days         Nephrostomy 08/13/20 0809 Right 12 Fr. 42 days         Urostomy 09/11/20 ileal conduit LLQ 14 days         Ileostomy 09/18/20 RLQ 7 days         Nephrostomy 09/18/20 0025 Left 7 days         Nephrostomy 09/18/20 0025 Right 7 days         Closed/Suction Drain 09/23/20 0720 Right Abdomen Bulb 10 Fr. 1 day                Physical Exam   Constitutional: No distress.   HENT:   Head: Normocephalic and atraumatic.   Eyes: Conjunctivae are normal. No scleral icterus.   Neck: Normal range of motion.   Cardiovascular: Normal rate.    Pulmonary/Chest: Effort normal. No respiratory distress.   Abdominal: Soft. She exhibits no distension. There is abdominal tenderness (appropriate). There is no rebound and no guarding.   Urostomy p/p/p draining clear yellow  urine  Ureteral stents in place  Ileostomy with dark liquid fluid output  IR drain LLQ draining light yellow clear fluid  Midline incision with staples, opened at superior and inferior ends with gauze overlying opening, lightly stained SS   Musculoskeletal: Normal range of motion.      Comments: SCDs in place   Neurological: She is alert.   Skin: Skin is warm and dry. She is not diaphoretic.         Significant Labs:    BMP:  Recent Labs   Lab 09/22/20  0739 09/23/20  0554 09/24/20  0354   * 142 141   K 2.5* 3.1* 3.2*    108 108   CO2 26 23 21*   BUN 13 14 17   CREATININE 0.8 0.9 1.0   CALCIUM 8.1* 7.8* 7.7*       CBC:   Recent Labs   Lab 09/22/20  0739 09/23/20  0554 09/24/20  0354   WBC 24.21* 23.17* 16.84*   HGB 7.0* 6.7* 6.7*   HCT 23.5* 22.9* 22.8*   * 486* 489*       All pertinent labs results from the past 24 hours have been reviewed.    Significant Imaging:  All pertinent imaging results/findings from the past 24 hours have been reviewed.                  Assessment/Plan:     * Bilateral ureteral obstruction  S/p urinary diversion with colon conduit 9/11   S/p emergent ex-lap 9/17, colo-colic anastomosis intact, bowel perforation found, underwent resection of right colon, remaining transverses colon, and proximal descending colon, ileostomy creation, Charlotte's pouch created     - CRS primary  - regular diet per Primary  - continue PT/OT, OOBTC as tolerated, encourage ambulation   - Wound care ostomy consulted for assistance with ostomy teaching   - patient is a [employer]; Hgb decreased to 6.7, management per primary and Heme-Onc (on EPO, B12, and iron)  - Drains: maintain, IR drain (Cr 0.7), ureteral stents, and red rubber  - Prophylaxis: IS, SCDs, GI ppx        VTE Risk Mitigation (From admission, onward)         Ordered     enoxaparin injection 40 mg  Every 24 hours      09/24/20 0544     IP VTE HIGH RISK PATIENT  Once      09/11/20 2024     Place sequential compression device   Until discontinued      09/11/20 2024                Burton Gallardo MD  Urology  [facility]

## 2020-09-25 NOTE — PLAN OF CARE
Pt  motivated for therapy and tolerated session well secondary to weakness. Pt required CGA in transfers and ambulation due to decreased activity tolerance. Pt ambulated ~30ft CGA using RW to increase activity tolerance required for adls and functional mobility. Pt ostomy bags leaking while on toilet, assisted nurse with supplies and clean up. Pt would benefit from SNF following d/c to return to PLOF and decrease burden of care.         Problem: Occupational Therapy Goal  Goal: Occupational Therapy Goal  Description: Goals to be met by: 9/26/2020    Patient will increase functional independence with ADLs by performing:    LE Dressing with Supervision.  Grooming while standing with Supervision.  Toileting from toilet with Supervision for hygiene and clothing management.   Supine to sit with Supervision.  Toilet transfer to toilet with Supervision.    Outcome: Ongoing, Progressing

## 2020-09-26 LAB
ALBUMIN SERPL BCP-MCNC: 1.8 G/DL (ref 3.5–5.2)
ALP SERPL-CCNC: 92 U/L (ref 55–135)
ALT SERPL W/O P-5'-P-CCNC: 14 U/L (ref 10–44)
ANION GAP SERPL CALC-SCNC: 8 MMOL/L (ref 8–16)
AST SERPL-CCNC: 14 U/L (ref 10–40)
BACTERIA SPEC AEROBE CULT: NO GROWTH
BASOPHILS # BLD AUTO: 0.02 K/UL (ref 0–0.2)
BASOPHILS NFR BLD: 0.1 % (ref 0–1.9)
BILIRUB SERPL-MCNC: 0.4 MG/DL (ref 0.1–1)
BUN SERPL-MCNC: 22 MG/DL (ref 6–20)
CALCIUM SERPL-MCNC: 7.9 MG/DL (ref 8.7–10.5)
CHLORIDE SERPL-SCNC: 109 MMOL/L (ref 95–110)
CO2 SERPL-SCNC: 18 MMOL/L (ref 23–29)
CREAT SERPL-MCNC: 1.7 MG/DL (ref 0.5–1.4)
DIFFERENTIAL METHOD: ABNORMAL
EOSINOPHIL # BLD AUTO: 0.3 K/UL (ref 0–0.5)
EOSINOPHIL NFR BLD: 1.7 % (ref 0–8)
ERYTHROCYTE [DISTWIDTH] IN BLOOD BY AUTOMATED COUNT: 19.4 % (ref 11.5–14.5)
EST. GFR  (AFRICAN AMERICAN): 38 ML/MIN/1.73 M^2
EST. GFR  (NON AFRICAN AMERICAN): 33 ML/MIN/1.73 M^2
GLUCOSE SERPL-MCNC: 146 MG/DL (ref 70–110)
HCT VFR BLD AUTO: 21.5 % (ref 37–48.5)
HGB BLD-MCNC: 6.4 G/DL (ref 12–16)
IMM GRANULOCYTES # BLD AUTO: 0.25 K/UL (ref 0–0.04)
IMM GRANULOCYTES NFR BLD AUTO: 1.3 % (ref 0–0.5)
LYMPHOCYTES # BLD AUTO: 0.9 K/UL (ref 1–4.8)
LYMPHOCYTES NFR BLD: 4.8 % (ref 18–48)
MAGNESIUM SERPL-MCNC: 2.2 MG/DL (ref 1.6–2.6)
MCH RBC QN AUTO: 27 PG (ref 27–31)
MCHC RBC AUTO-ENTMCNC: 29.8 G/DL (ref 32–36)
MCV RBC AUTO: 91 FL (ref 82–98)
MONOCYTES # BLD AUTO: 1.8 K/UL (ref 0.3–1)
MONOCYTES NFR BLD: 9.6 % (ref 4–15)
NEUTROPHILS # BLD AUTO: 15.4 K/UL (ref 1.8–7.7)
NEUTROPHILS NFR BLD: 82.5 % (ref 38–73)
NRBC BLD-RTO: 1 /100 WBC
PHOSPHATE SERPL-MCNC: 3.5 MG/DL (ref 2.7–4.5)
PLATELET # BLD AUTO: 483 K/UL (ref 150–350)
PMV BLD AUTO: 10.4 FL (ref 9.2–12.9)
POCT GLUCOSE: 161 MG/DL (ref 70–110)
POCT GLUCOSE: 161 MG/DL (ref 70–110)
POCT GLUCOSE: 175 MG/DL (ref 70–110)
POCT GLUCOSE: 218 MG/DL (ref 70–110)
POTASSIUM SERPL-SCNC: 4.3 MMOL/L (ref 3.5–5.1)
PROT SERPL-MCNC: 6.3 G/DL (ref 6–8.4)
RBC # BLD AUTO: 2.37 M/UL (ref 4–5.4)
SODIUM SERPL-SCNC: 135 MMOL/L (ref 136–145)
TRIGL SERPL-MCNC: 185 MG/DL (ref 30–150)
WBC # BLD AUTO: 18.71 K/UL (ref 3.9–12.7)

## 2020-09-26 PROCEDURE — 97116 GAIT TRAINING THERAPY: CPT | Mod: CQ

## 2020-09-26 PROCEDURE — 63600175 PHARM REV CODE 636 W HCPCS: Performed by: NURSE PRACTITIONER

## 2020-09-26 PROCEDURE — 25000003 PHARM REV CODE 250: Performed by: STUDENT IN AN ORGANIZED HEALTH CARE EDUCATION/TRAINING PROGRAM

## 2020-09-26 PROCEDURE — 97530 THERAPEUTIC ACTIVITIES: CPT

## 2020-09-26 PROCEDURE — 84478 ASSAY OF TRIGLYCERIDES: CPT

## 2020-09-26 PROCEDURE — A4216 STERILE WATER/SALINE, 10 ML: HCPCS | Performed by: STUDENT IN AN ORGANIZED HEALTH CARE EDUCATION/TRAINING PROGRAM

## 2020-09-26 PROCEDURE — 80053 COMPREHEN METABOLIC PANEL: CPT

## 2020-09-26 PROCEDURE — A4217 STERILE WATER/SALINE, 500 ML: HCPCS | Performed by: STUDENT IN AN ORGANIZED HEALTH CARE EDUCATION/TRAINING PROGRAM

## 2020-09-26 PROCEDURE — 25000003 PHARM REV CODE 250: Performed by: NURSE PRACTITIONER

## 2020-09-26 PROCEDURE — 11000001 HC ACUTE MED/SURG PRIVATE ROOM

## 2020-09-26 PROCEDURE — 97530 THERAPEUTIC ACTIVITIES: CPT | Mod: CQ

## 2020-09-26 PROCEDURE — 63600175 PHARM REV CODE 636 W HCPCS: Performed by: STUDENT IN AN ORGANIZED HEALTH CARE EDUCATION/TRAINING PROGRAM

## 2020-09-26 PROCEDURE — 84100 ASSAY OF PHOSPHORUS: CPT

## 2020-09-26 PROCEDURE — 83735 ASSAY OF MAGNESIUM: CPT

## 2020-09-26 PROCEDURE — C9113 INJ PANTOPRAZOLE SODIUM, VIA: HCPCS | Performed by: STUDENT IN AN ORGANIZED HEALTH CARE EDUCATION/TRAINING PROGRAM

## 2020-09-26 PROCEDURE — 36415 COLL VENOUS BLD VENIPUNCTURE: CPT

## 2020-09-26 PROCEDURE — 97535 SELF CARE MNGMENT TRAINING: CPT

## 2020-09-26 PROCEDURE — 85025 COMPLETE CBC W/AUTO DIFF WBC: CPT

## 2020-09-26 PROCEDURE — B4185 PARENTERAL SOL 10 GM LIPIDS: HCPCS | Performed by: STUDENT IN AN ORGANIZED HEALTH CARE EDUCATION/TRAINING PROGRAM

## 2020-09-26 RX ORDER — LOPERAMIDE HYDROCHLORIDE 2 MG/1
2 CAPSULE ORAL 2 TIMES DAILY
Status: DISCONTINUED | OUTPATIENT
Start: 2020-09-26 | End: 2020-10-13 | Stop reason: HOSPADM

## 2020-09-26 RX ADMIN — INSULIN ASPART 1 UNITS: 100 INJECTION, SOLUTION INTRAVENOUS; SUBCUTANEOUS at 09:09

## 2020-09-26 RX ADMIN — MAGNESIUM SULFATE HEPTAHYDRATE: 500 INJECTION, SOLUTION INTRAMUSCULAR; INTRAVENOUS at 11:09

## 2020-09-26 RX ADMIN — Medication 10 ML: at 06:09

## 2020-09-26 RX ADMIN — LOPERAMIDE HYDROCHLORIDE 2 MG: 2 CAPSULE ORAL at 09:09

## 2020-09-26 RX ADMIN — METOPROLOL TARTRATE 25 MG: 25 TABLET, FILM COATED ORAL at 09:09

## 2020-09-26 RX ADMIN — PIPERACILLIN SODIUM AND TAZOBACTAM SODIUM 4.5 G: 4; .5 INJECTION, POWDER, LYOPHILIZED, FOR SOLUTION INTRAVENOUS at 03:09

## 2020-09-26 RX ADMIN — I.V. FAT EMULSION 250 ML: 20 EMULSION INTRAVENOUS at 11:09

## 2020-09-26 RX ADMIN — Medication 10 ML: at 12:09

## 2020-09-26 RX ADMIN — CYANOCOBALAMIN 100 MCG: 1000 INJECTION, SOLUTION INTRAMUSCULAR at 09:09

## 2020-09-26 RX ADMIN — PANTOPRAZOLE SODIUM 40 MG: 40 INJECTION, POWDER, LYOPHILIZED, FOR SOLUTION INTRAVENOUS at 09:09

## 2020-09-26 RX ADMIN — ENOXAPARIN SODIUM 40 MG: 40 INJECTION SUBCUTANEOUS at 04:09

## 2020-09-26 RX ADMIN — ACETAMINOPHEN 650 MG: 325 TABLET ORAL at 09:09

## 2020-09-26 RX ADMIN — INSULIN ASPART 2 UNITS: 100 INJECTION, SOLUTION INTRAVENOUS; SUBCUTANEOUS at 05:09

## 2020-09-26 RX ADMIN — INSULIN ASPART 4 UNITS: 100 INJECTION, SOLUTION INTRAVENOUS; SUBCUTANEOUS at 09:09

## 2020-09-26 RX ADMIN — INSULIN ASPART 2 UNITS: 100 INJECTION, SOLUTION INTRAVENOUS; SUBCUTANEOUS at 12:09

## 2020-09-26 RX ADMIN — PIPERACILLIN SODIUM AND TAZOBACTAM SODIUM 4.5 G: 4; .5 INJECTION, POWDER, LYOPHILIZED, FOR SOLUTION INTRAVENOUS at 06:09

## 2020-09-26 RX ADMIN — PIPERACILLIN SODIUM AND TAZOBACTAM SODIUM 4.5 G: 4; .5 INJECTION, POWDER, LYOPHILIZED, FOR SOLUTION INTRAVENOUS at 12:09

## 2020-09-26 NOTE — PT/OT/SLP PROGRESS
Occupational Therapy   Treatment    Name: Edita Riley  MRN: [id number]  Admitting Diagnosis:  Bilateral ureteral obstruction  9 Days Post-Op    Recommendations:     Discharge Recommendations: nursing facility, skilled  Discharge Equipment Recommendations:  bath bench, walker, rolling, wheelchair  Barriers to discharge:       Assessment:     Edita Riley is a 55 y.o. female with a medical diagnosis of Bilateral ureteral obstruction.  Patient tolerated therapy session well with increased rest breaks. She completed grooming task standing at sink at stand by assistance. She completed toilet transfer at contact guard assistance. Patient ambulated x 4 bouts to increase endurance for ADLs requiring stand by assist to contact guard assistance with use of rolling walker. Performance deficits affecting function are weakness, impaired endurance, impaired self care skills, impaired functional mobilty, gait instability, impaired balance, decreased lower extremity function, impaired cardiopulmonary response to activity.     Rehab Prognosis:  Good; patient would benefit from acute skilled OT services to address these deficits and reach maximum level of function.       Plan:     Patient to be seen 4 x/week to address the above listed problems via self-care/home management, therapeutic activities, therapeutic exercises  · Plan of Care Expires: 10/11/20  · Plan of Care Reviewed with: patient    Subjective     Patient stated her goal was to make it to the nurses station.   Pain/Comfort:  · Pain Rating 1: 0/10    Objective:     Communicated with: RN prior to session.  Patient found up in chair with telemetry, colostomy, peripheral IV, nephrostomy upon OT entry to room.    General Precautions: Standard, fall   Orthopedic Precautions:N/A   Braces: N/A     Occupational Performance:     Functional Mobility/Transfers:  · Patient completed Sit <> Stand Transfer with stand by assistance and contact guard assistance   with  rolling walker x 4 bouts from chair   · Patient completed toilet transfer on/off room toilet at contact guard assistance with use of rolling walker   · Functional Mobility: patient ambulated 4 bouts with use of rolling walker at contact guard assistance to stand by assistance with rest breaks to increase endurance for ADLs, 15 feet, 20 feet, 35 feet and 35 feet     Activities of Daily Living:  · Grooming: stand by assistance standing at sink to brush teeth  · Upper Body Dressing: minimum assistance to don gown for back, IV in left arm  · Toileting: stand by assistance patient cleaned front socorro area while sitting on toilet after being soiled in chair    [facility] 6 Click ADL: 18    Treatment & Education:  -patient with good sitting balance with functional task sitting on toilet   -patient with extended rest breaks throughout session   -patient educated on role of OT and plan of care     Patient left up in chair with all lines intact, call button in reach and RN notifiedEducation:      GOALS:   Multidisciplinary Problems     Occupational Therapy Goals        Problem: Occupational Therapy Goal    Goal Priority Disciplines Outcome Interventions   Occupational Therapy Goal     OT, PT/OT Ongoing, Progressing    Description: Goals to be met by: 9/26/2020    Patient will increase functional independence with ADLs by performing:    LE Dressing with Supervision.  Grooming while standing with Supervision.  Toileting from toilet with Supervision for hygiene and clothing management.   Supine to sit with Supervision.  Toilet transfer to toilet with Supervision.                     Time Tracking:     OT Date of Treatment: 09/26/20  OT Start Time: 1323  OT Stop Time: 1404  OT Total Time (min): 41 min    Billable Minutes:Self Care/Home Management 15  Therapeutic Activity 26    Leyda Alcantar OT  9/26/2020

## 2020-09-26 NOTE — ASSESSMENT & PLAN NOTE
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

## 2020-09-26 NOTE — SUBJECTIVE & OBJECTIVE
Interval History: NAEON. Fevers yesterday to Tmax 101.6. Now afebrile.  ORESTES output 5 cc. Tolerating regular diet, pain controlled, ambulating.  Midline wound with wound vac, 20 cc output.  Creatinine stable at 1.7. Culture growing bacteroides, Solobacterium. On zosyn.  Sitting comfortably in chair. Feels great.    Objective:     Temp:  [96.6 °F (35.9 °C)-101.6 °F (38.7 °C)] 96.6 °F (35.9 °C)  Pulse:  [106-118] 118  Resp:  [20-34] 20  SpO2:  [97 %-99 %] 98 %  BP: (121-135)/(61-84) 125/61     Body mass index is 28.94 kg/m².           Drains     Drain                 Nephrostomy 08/13/20 0805 Left 12 Fr. 42 days         Nephrostomy 08/13/20 0809 Right 12 Fr. 42 days         Urostomy 09/11/20 ileal conduit LLQ 14 days         Ileostomy 09/18/20 RLQ 7 days         Nephrostomy 09/18/20 0025 Left 7 days         Nephrostomy 09/18/20 0025 Right 7 days         Closed/Suction Drain 09/23/20 0720 Right Abdomen Bulb 10 Fr. 1 day                Physical Exam   Constitutional: No distress.   HENT:   Head: Normocephalic and atraumatic.   Eyes: Conjunctivae are normal. No scleral icterus.   Neck: Normal range of motion.   Cardiovascular: Normal rate.    Pulmonary/Chest: Effort normal. No respiratory distress.   Abdominal: Soft. She exhibits no distension. There is abdominal tenderness (appropriate). There is no rebound and no guarding.   Urostomy p/p/p draining clear yellow urine  Ureteral stents in place  Ileostomy with dark liquid fluid output  IR drain LLQ draining light yellow clear fluid  Midline incision with staples, opened at superior and inferior ends with wound vac in    Musculoskeletal: Normal range of motion.      Comments: SCDs in place   Neurological: She is alert.   Skin: Skin is warm and dry. She is not diaphoretic.         Significant Labs:    BMP:  Recent Labs   Lab 09/25/20  0616 09/25/20  1531 09/26/20  0520    135* 135*   K 4.1 3.9 4.3    106 109   CO2 20* 20* 18*   BUN 23* 23* 22*   CREATININE 1.6*  1.7* 1.7*   CALCIUM 8.0* 8.0* 7.9*       CBC:   Recent Labs   Lab 09/25/20  0616 09/25/20  1531 09/26/20  0520   WBC 19.16* 20.70* 18.71*   HGB 6.9* 6.7* 6.4*   HCT 23.0* 22.1* 21.5*   * 507* 483*       All pertinent labs results from the past 24 hours have been reviewed.    Significant Imaging:  All pertinent imaging results/findings from the past 24 hours have been reviewed.

## 2020-09-26 NOTE — PT/OT/SLP PROGRESS
"Physical Therapy Treatment    Patient Name:  Edita Riley   MRN:  [id number]    Recommendations:     Discharge Recommendations:  nursing facility, skilled   Discharge Equipment Recommendations: bath bench, walker, rolling, wheelchair   Barriers to discharge: increased assist level for mobility and self care.     Assessment:     Edita Riley is a 55 y.o. female admitted with a medical diagnosis of Bilateral ureteral obstruction.  She presents with the following impairments/functional limitations:  weakness, impaired endurance, impaired self care skills, impaired functional mobilty, gait instability, impaired balance, decreased lower extremity function.  Patient has made progress each day and feel she would benefit from increased frequency to work toward meeting goals set as discussed with mulit disciplinary staff. Today she was able to ambulate 4 trials, 15 ft, 20 ft then 35 ft x 2 trials with cga/sba and RW. She is very motivated and would benefit from SNF stay to continue to improve mobility tolerance and gain independence.     Rehab Prognosis: Good; patient would benefit from acute skilled PT services to address these deficits and reach maximum level of function.    Recent Surgery: Procedure(s):  COLECTOMY, RIGHT Extended  CREATION, ILEOSTOMY 9 Days Post-Op    Plan:     During this hospitalization, patient to be seen 6 x/week to address the identified rehab impairments via gait training, therapeutic activities, therapeutic exercises and progress toward the following goals:    · Plan of Care Expires:  10/21/20    Subjective     Chief Complaint: fatigue  Patient/Family Comments/goals: "I don't like laying in the bed". Patient very eager to work with therapy.   Pain/Comfort:  Pain Rating 1: 0/10      Objective:     Communicated with nurse prior to session.  Patient found up in chair with telemetry, colostomy upon PT entry to room.     General Precautions: Standard, fall   Orthopedic " Precautions:N/A   Braces: N/A     Functional Mobility:  · Transfers:     · Gait: 15ft, 20ft,35ft, 35 ft with RW and CGA with chair f/u for seated rests as needed after gt trials. SOB, fatigued. 02 sats remained above 98%.        AM-PAC 6 CLICK MOBILITY  Turning over in bed (including adjusting bedclothes, sheets and blankets)?: 3  Sitting down on and standing up from a chair with arms (e.g., wheelchair, bedside commode, etc.): 3  Moving from lying on back to sitting on the side of the bed?: 3  Moving to and from a bed to a chair (including a wheelchair)?: 3  Need to walk in hospital room?: 3  Climbing 3-5 steps with a railing?: 2  Basic Mobility Total Score: 17       Therapeutic Activities and Exercises:   white board updated  Worked on standing tolerance with OT present while performing ADLs at [facility].   Co tx performed due to patient need for 2 skilled therapists to ensure patient and staff safety and to accommodate for patients activity tolerance and many lines/drains/vac.  Discussed with OT/PT increasing patient frequency from 4 times a week to 6 to maximize patient outcome.      Patient left up in chair with all lines intact, call button in reach and nurse notified..    GOALS:   Multidisciplinary Problems     Physical Therapy Goals        Problem: Physical Therapy Goal    Goal Priority Disciplines Outcome Goal Variances Interventions   Physical Therapy Goal     PT, PT/OT Ongoing, Progressing     Description: Patient re-evaluated 20 s/p further surgery.  Goals to be met by: 10/5/2020    Patient will increase functional independence with mobility by performin. Supine to sit with Set-up [place]  2. Sit to supine with Set-up [place]  3. Sit to stand transfer with Supervision  4. Bed to chair transfer with Supervision using Rolling Walker  5. Gait  x 120 feet with Stand-by Assistance using Rolling Walker.   6. Ascend/descend 3 stair with left Handrails Stand-by Assistance                   Time  Tracking:     PT Received On: 09/26/20  PT Start Time: 0122     PT Stop Time: 0205  PT Total Time (min): 43 min     Billable Minutes: Gait Training 23 and Therapeutic Activity 20    Treatment Type: Treatment  PT/PTA: PTA     PTA Visit Number: 4     Genevieve Boston, PTA  09/26/2020

## 2020-09-26 NOTE — PLAN OF CARE
Problem: Occupational Therapy Goal  Goal: Occupational Therapy Goal  Description: Goals to be met by: 9/26/2020    Patient will increase functional independence with ADLs by performing:    LE Dressing with Supervision.  Grooming while standing with Supervision.  Toileting from toilet with Supervision for hygiene and clothing management.   Supine to sit with Supervision.  Toilet transfer to toilet with Supervision.    Outcome: Ongoing, Progressing    OT goals remain appropriate.   Leyda Alcantar, OT  9/26/2020

## 2020-09-26 NOTE — ASSESSMENT & PLAN NOTE
Ms. Riley is a 55 y.o. female with b/l ureteral obstruction s/p transverse colon conduit on 9/11/2020. Extended R colectomy and ileostomy creation on 9/17.      - Diet regular  - Continue TPN  - Zosyn  -  IR drain placed 9/23  - OOB, activity as tolerated  - PT/OT

## 2020-09-26 NOTE — ASSESSMENT & PLAN NOTE
Will utilize p.r.n. blood pressure medication only if patient's blood pressure greater than  180/110 and she develops symptoms such as worsening chest pain or shortness of breath.

## 2020-09-26 NOTE — PROGRESS NOTES
[facility]  General Surgery  Progress Note    Subjective:     History of Present Illness:  No notes on file    Post-Op Info:  Procedure(s):  COLECTOMY, RIGHT Extended  CREATION, ILEOSTOMY   9 Days Post-Op       Subjective:     Interval History:   No acute events overnight.   Tmax 101.6  Denies nausea/vomiting.  3.2L of ostomy output  Having good urine output      Post-Op Info:  Procedure(s):  COLECTOMY, RIGHT Extended  CREATION, ILEOSTOMY   9 Days Post-Op      Medications:  Continuous Infusions:   sodium chloride 0.9% 40 mL/hr at 09/25/20 1113    TPN ADULT CENTRAL LINE CUSTOM 60 mL/hr at 09/25/20 2241     Scheduled Meds:   cyanocobalamin  100 mcg Intramuscular Weekly    enoxaparin  40 mg Subcutaneous Q24H    epoetin yecenia-epbx  20,000 Units Subcutaneous Q48H    loperamide  2 mg Oral BID    metoprolol tartrate  25 mg Oral BID    pantoprazole  40 mg Intravenous Daily    piperacillin-tazobactam (ZOSYN) IVPB  4.5 g Intravenous Q8H    sodium chloride 0.9%  10 mL Intravenous Q6H     PRN Meds:   acetaminophen    dextrose 50%    glucagon (human recombinant)    HYDROmorphone    insulin aspart U-100    iohexol    iohexol    labetalol    ondansetron    oxyCODONE-acetaminophen    oxyCODONE-acetaminophen    sodium chloride 0.9%    sodium chloride 0.9%    sodium chloride 0.9%        Objective:     Vital Signs (Most Recent):  Temp: 96.5 °F (35.8 °C) (09/26/20 0900)  Pulse: (!) 118 (09/26/20 0400)  Resp: 20 (09/26/20 0400)  BP: 130/63 (09/26/20 0900)  SpO2: 98 % (09/26/20 0400) Vital Signs (24h Range):  Temp:  [96.5 °F (35.8 °C)-101.6 °F (38.7 °C)] 96.5 °F (35.8 °C)  Pulse:  [106-118] 118  Resp:  [20-34] 20  SpO2:  [97 %-99 %] 98 %  BP: (121-135)/(61-84) 130/63     Intake/Output - Last 3 Shifts       09/24 0700 - 09/25 0659 09/25 0700 - 09/26 0659 09/26 0700 - 09/27 0659    P.O. 600 480     I.V. (mL/kg)  271.3 (3.1)     IV Piggyback 200 1300      891     Total Intake(mL/kg) 1001  (11.3) 2942.3 (33.1)     Urine (mL/kg/hr) 925 (0.4) 1850 (0.9)     Emesis/NG output       Drains 40 5     Other  20     Stool 2575 3180     Total Output 3540 5055     Net -6603 -2182.7                  Physical Exam  Vitals signs and nursing note reviewed.   Constitutional:       Appearance: She is well-developed. She is not ill-appearing.   HENT:      Head: Normocephalic.   Eyes:      Pupils: Pupils are equal, round, and reactive to light.   Cardiovascular:      Rate and Rhythm: Normal rate and regular rhythm.      Heart sounds: Normal heart sounds.   Pulmonary:      Effort: Pulmonary effort is normal. No respiratory distress.      Breath sounds: Normal breath sounds. No wheezing or rales.   Abdominal:      Palpations: Abdomen is soft. There is no mass.      Tenderness: There is no guarding or rebound.      Comments: abd inc line with wound vac in place  Ileostomy functional  Urostomy functional  IR drain with minimal drainage  Skin:     General: Skin is warm and dry.   Neurological:      Mental Status: She is alert and oriented to person, place, and time.   Psychiatric:         Behavior: Behavior normal.         Thought Content: Thought content normal.         Judgment: Judgment normal.           Significant Labs:  BMP (Last 3 Results):   Recent Labs   Lab 09/24/20  0354 09/25/20  0616 09/25/20  1531 09/26/20  0520   * 156* 167* 146*    138 135* 135*   K 3.2* 4.1 3.9 4.3    108 106 109   CO2 21* 20* 20* 18*   BUN 17 23* 23* 22*   CREATININE 1.0 1.6* 1.7* 1.7*   CALCIUM 7.7* 8.0* 8.0* 7.9*   MG 2.1 2.2  --  2.2     CBC (Last 3 Results):   Recent Labs   Lab 09/25/20  0616 09/25/20  1531 09/26/20  0520   WBC 19.16* 20.70* 18.71*   RBC 2.51* 2.44* 2.37*   HGB 6.9* 6.7* 6.4*   HCT 23.0* 22.1* 21.5*   * 507* 483*   MCV 92 91 91   MCH 27.5 27.5 27.0   MCHC 30.0* 30.3* 29.8*       Significant Diagnostics:  None    Assessment/Plan:     * Bilateral ureteral obstruction  Ms. Riley is a 55 y.o.  female with b/l ureteral obstruction s/p transverse colon conduit on 9/11/2020. Extended R colectomy and ileostomy creation on 9/17.      - Diet regular  - Continue TPN  - Zosyn  -  IR drain placed 9/23  - OOB, activity as tolerated  - PT/OT    Wound infection after surgery  Wound vac  Continue zosyn    Hypokalemia  Will replace potassium and monitor electrolytes closely.     Acute blood loss anemia  Chronic anemia due to chronic dx and acute blood loss anemia r/t surgery  Monitor labs  No blood due to religiouos belief  Iron IV     Peritonitis  The patient is a 55-year-old female who is now 1 week out from a urinary diversion by Dr Balbuena due to bladder outlet obstruction from prior pelvic radiation therapy.  At time of that procedure, I procured a vascularized segment of transverse colon to be used as the urinary conduit, as requested by Urology.  A primary end-to-end, hand-sewn colocolonic anastomosis was performed at that time to restore intestinal continuity once the segment of colon to be used for the conduit had been excluded.  The patient did well for the 1st few days postoperatively but then began to develop abdominal pain and distention consistent with an ileus.  This morning, there was small amount of pneumoperitoneum seen on a plain abdominal film, and later today she began to drain feculent appearing fluid through her K[place]as drain.  She had a normal white blood cell count and did not have peritonitis on examination, so a CT scan was performed, which demonstrated a large volume of free abdominal fluid as well as a large volume of pneumoperitoneum, concerning for an anastomotic leak.  She is being brought back to the operating room for urgent exploratory laparotomy.    OR 9/17/2020     Cont TPN  Consult wound care for ileosotmy  Naseem vaughan, aubrey  Enc amb and IS  casandra smal amt of diet    Essential hypertension  Will utilize p.r.n. blood pressure medication only if patient's blood pressure greater than   180/110 and she develops symptoms such as worsening chest pain or shortness of breath.        Cecile Francisco MD  General Surgery  [facility]

## 2020-09-26 NOTE — PROGRESS NOTES
[facility]  Urology  Progress Note    Patient Name: Edita Riley  MRN: [id number]  Admission Date: 9/11/2020  Hospital Length of Stay: 15 days  Code Status: Full Code   Attending Provider: Hammad Reynolds MD   Primary Care Physician: Audrey Mustafa MD    Subjective:     HPI:  Edita Riley is a 55 y.o. female with a history of cervical cancer s/p pelvic radiation. She has since developed bilateral ureteral strictures and bilateral hydronephrosis R>L. She had a MAG3 scan confirmed presence of bilateral obstruction. She is s/p open transverse colon conduit urinary diversion on 9/11/2020.     Interval History: NAEON. Fevers yesterday to Tmax 101.6. Now afebrile.  ORESTES output 5 cc. Tolerating regular diet, pain controlled, ambulating.  Midline wound with wound vac, 20 cc output.  Creatinine stable at 1.7. Culture growing bacteroides, Solobacterium. On zosyn.  Sitting comfortably in chair. Feels great.    Objective:     Temp:  [96.6 °F (35.9 °C)-101.6 °F (38.7 °C)] 96.6 °F (35.9 °C)  Pulse:  [106-118] 118  Resp:  [20-34] 20  SpO2:  [97 %-99 %] 98 %  BP: (121-135)/(61-84) 125/61     Body mass index is 28.94 kg/m².           Drains     Drain                 Nephrostomy 08/13/20 0805 Left 12 Fr. 42 days         Nephrostomy 08/13/20 0809 Right 12 Fr. 42 days         Urostomy 09/11/20 ileal conduit LLQ 14 days         Ileostomy 09/18/20 RLQ 7 days         Nephrostomy 09/18/20 0025 Left 7 days         Nephrostomy 09/18/20 0025 Right 7 days         Closed/Suction Drain 09/23/20 0720 Right Abdomen Bulb 10 Fr. 1 day                Physical Exam   Constitutional: No distress.   HENT:   Head: Normocephalic and atraumatic.   Eyes: Conjunctivae are normal. No scleral icterus.   Neck: Normal range of motion.   Cardiovascular: Normal rate.    Pulmonary/Chest: Effort normal. No respiratory distress.   Abdominal: Soft. She exhibits no distension. There is abdominal tenderness (appropriate). There  is no rebound and no guarding.   Urostomy p/p/p draining clear yellow urine  Ureteral stents in place  Ileostomy with dark liquid fluid output  IR drain LLQ draining light yellow clear fluid  Midline incision with staples, opened at superior and inferior ends with wound vac in    Musculoskeletal: Normal range of motion.      Comments: SCDs in place   Neurological: She is alert.   Skin: Skin is warm and dry. She is not diaphoretic.         Significant Labs:    BMP:  Recent Labs   Lab 09/25/20  0616 09/25/20  1531 09/26/20  0520    135* 135*   K 4.1 3.9 4.3    106 109   CO2 20* 20* 18*   BUN 23* 23* 22*   CREATININE 1.6* 1.7* 1.7*   CALCIUM 8.0* 8.0* 7.9*       CBC:   Recent Labs   Lab 09/25/20  0616 09/25/20  1531 09/26/20  0520   WBC 19.16* 20.70* 18.71*   HGB 6.9* 6.7* 6.4*   HCT 23.0* 22.1* 21.5*   * 507* 483*       All pertinent labs results from the past 24 hours have been reviewed.    Significant Imaging:  All pertinent imaging results/findings from the past 24 hours have been reviewed.                  Assessment/Plan:     * Bilateral ureteral obstruction  S/p urinary diversion with colon conduit 9/11   S/p emergent ex-lap 9/17, colo-colic anastomosis intact, bowel perforation found, underwent resection of right colon, remaining transverses colon, and proximal descending colon, ileostomy creation, Charlotte's pouch created     - CRS primary  - regular diet per Primary  - continue PT/OT, OOBTC as tolerated, encourage ambulation  - Wound care ostomy consulted for assistance with ostomy teaching   - patient is a [employer]; Hgb decreased to 6.4, management per primary and Heme-Onc (on EPO, B12, and iron)  - Drains: maintain, IR drain (Cr 0.7), ureteral stents  - Prophylaxis: IS, SCDs, GI ppx        VTE Risk Mitigation (From admission, onward)         Ordered     enoxaparin injection 40 mg  Every 24 hours      09/24/20 0544     IP VTE HIGH RISK PATIENT  Once      09/11/20 2024     Place  sequential compression device  Until discontinued      09/11/20 2024                Nolan Hartman MD  Urology  [facility]

## 2020-09-27 PROBLEM — B49 FUNGEMIA: Status: ACTIVE | Noted: 2020-09-27

## 2020-09-27 LAB
ALBUMIN SERPL BCP-MCNC: 1.8 G/DL (ref 3.5–5.2)
ALP SERPL-CCNC: 94 U/L (ref 55–135)
ALT SERPL W/O P-5'-P-CCNC: 11 U/L (ref 10–44)
ANION GAP SERPL CALC-SCNC: 12 MMOL/L (ref 8–16)
AST SERPL-CCNC: 15 U/L (ref 10–40)
BASOPHILS # BLD AUTO: 0.02 K/UL (ref 0–0.2)
BASOPHILS NFR BLD: 0.1 % (ref 0–1.9)
BILIRUB SERPL-MCNC: 0.4 MG/DL (ref 0.1–1)
BUN SERPL-MCNC: 25 MG/DL (ref 6–20)
CALCIUM SERPL-MCNC: 8.2 MG/DL (ref 8.7–10.5)
CHLORIDE SERPL-SCNC: 108 MMOL/L (ref 95–110)
CO2 SERPL-SCNC: 16 MMOL/L (ref 23–29)
CREAT SERPL-MCNC: 2 MG/DL (ref 0.5–1.4)
DIFFERENTIAL METHOD: ABNORMAL
EOSINOPHIL # BLD AUTO: 0.3 K/UL (ref 0–0.5)
EOSINOPHIL NFR BLD: 1.8 % (ref 0–8)
ERYTHROCYTE [DISTWIDTH] IN BLOOD BY AUTOMATED COUNT: 18.9 % (ref 11.5–14.5)
EST. GFR  (AFRICAN AMERICAN): 31.3 ML/MIN/1.73 M^2
EST. GFR  (NON AFRICAN AMERICAN): 27.1 ML/MIN/1.73 M^2
GLUCOSE SERPL-MCNC: 133 MG/DL (ref 70–110)
HCT VFR BLD AUTO: 24 % (ref 37–48.5)
HGB BLD-MCNC: 7.1 G/DL (ref 12–16)
IMM GRANULOCYTES # BLD AUTO: 0.2 K/UL (ref 0–0.04)
IMM GRANULOCYTES NFR BLD AUTO: 1.3 % (ref 0–0.5)
LYMPHOCYTES # BLD AUTO: 1 K/UL (ref 1–4.8)
LYMPHOCYTES NFR BLD: 6.1 % (ref 18–48)
MAGNESIUM SERPL-MCNC: 2.5 MG/DL (ref 1.6–2.6)
MCH RBC QN AUTO: 26.8 PG (ref 27–31)
MCHC RBC AUTO-ENTMCNC: 29.6 G/DL (ref 32–36)
MCV RBC AUTO: 91 FL (ref 82–98)
MONOCYTES # BLD AUTO: 1.6 K/UL (ref 0.3–1)
MONOCYTES NFR BLD: 10.1 % (ref 4–15)
NEUTROPHILS # BLD AUTO: 12.9 K/UL (ref 1.8–7.7)
NEUTROPHILS NFR BLD: 80.6 % (ref 38–73)
NRBC BLD-RTO: 1 /100 WBC
PHOSPHATE SERPL-MCNC: 4.8 MG/DL (ref 2.7–4.5)
PLATELET # BLD AUTO: 459 K/UL (ref 150–350)
PMV BLD AUTO: 10.9 FL (ref 9.2–12.9)
POCT GLUCOSE: 133 MG/DL (ref 70–110)
POCT GLUCOSE: 147 MG/DL (ref 70–110)
POCT GLUCOSE: 154 MG/DL (ref 70–110)
POCT GLUCOSE: 157 MG/DL (ref 70–110)
POCT GLUCOSE: 188 MG/DL (ref 70–110)
POTASSIUM SERPL-SCNC: 4.8 MMOL/L (ref 3.5–5.1)
PROT SERPL-MCNC: 6.7 G/DL (ref 6–8.4)
RBC # BLD AUTO: 2.65 M/UL (ref 4–5.4)
SODIUM SERPL-SCNC: 136 MMOL/L (ref 136–145)
WBC # BLD AUTO: 15.95 K/UL (ref 3.9–12.7)

## 2020-09-27 PROCEDURE — 63600175 PHARM REV CODE 636 W HCPCS: Performed by: STUDENT IN AN ORGANIZED HEALTH CARE EDUCATION/TRAINING PROGRAM

## 2020-09-27 PROCEDURE — 11000001 HC ACUTE MED/SURG PRIVATE ROOM

## 2020-09-27 PROCEDURE — 84100 ASSAY OF PHOSPHORUS: CPT

## 2020-09-27 PROCEDURE — 99233 SBSQ HOSP IP/OBS HIGH 50: CPT | Mod: ,,, | Performed by: INTERNAL MEDICINE

## 2020-09-27 PROCEDURE — A4216 STERILE WATER/SALINE, 10 ML: HCPCS | Performed by: STUDENT IN AN ORGANIZED HEALTH CARE EDUCATION/TRAINING PROGRAM

## 2020-09-27 PROCEDURE — 99233 PR SUBSEQUENT HOSPITAL CARE,LEVL III: ICD-10-PCS | Mod: ,,, | Performed by: INTERNAL MEDICINE

## 2020-09-27 PROCEDURE — B4185 PARENTERAL SOL 10 GM LIPIDS: HCPCS | Performed by: STUDENT IN AN ORGANIZED HEALTH CARE EDUCATION/TRAINING PROGRAM

## 2020-09-27 PROCEDURE — 36415 COLL VENOUS BLD VENIPUNCTURE: CPT

## 2020-09-27 PROCEDURE — 85025 COMPLETE CBC W/AUTO DIFF WBC: CPT

## 2020-09-27 PROCEDURE — 25000003 PHARM REV CODE 250: Performed by: STUDENT IN AN ORGANIZED HEALTH CARE EDUCATION/TRAINING PROGRAM

## 2020-09-27 PROCEDURE — 83735 ASSAY OF MAGNESIUM: CPT

## 2020-09-27 PROCEDURE — 80053 COMPREHEN METABOLIC PANEL: CPT

## 2020-09-27 PROCEDURE — 25000003 PHARM REV CODE 250: Performed by: NURSE PRACTITIONER

## 2020-09-27 PROCEDURE — 97116 GAIT TRAINING THERAPY: CPT | Mod: CQ

## 2020-09-27 PROCEDURE — 97530 THERAPEUTIC ACTIVITIES: CPT | Mod: CQ

## 2020-09-27 PROCEDURE — C9113 INJ PANTOPRAZOLE SODIUM, VIA: HCPCS | Performed by: STUDENT IN AN ORGANIZED HEALTH CARE EDUCATION/TRAINING PROGRAM

## 2020-09-27 RX ORDER — FLUCONAZOLE 2 MG/ML
200 INJECTION, SOLUTION INTRAVENOUS
Status: DISCONTINUED | OUTPATIENT
Start: 2020-09-28 | End: 2020-09-27

## 2020-09-27 RX ORDER — ENOXAPARIN SODIUM 100 MG/ML
30 INJECTION SUBCUTANEOUS EVERY 24 HOURS
Status: DISCONTINUED | OUTPATIENT
Start: 2020-09-27 | End: 2020-09-28

## 2020-09-27 RX ORDER — CEFEPIME HYDROCHLORIDE 1 G/1
1 INJECTION, POWDER, FOR SOLUTION INTRAMUSCULAR; INTRAVENOUS
Status: DISCONTINUED | OUTPATIENT
Start: 2020-09-27 | End: 2020-10-13 | Stop reason: HOSPADM

## 2020-09-27 RX ORDER — FLUCONAZOLE 2 MG/ML
400 INJECTION, SOLUTION INTRAVENOUS
Status: DISCONTINUED | OUTPATIENT
Start: 2020-09-27 | End: 2020-09-27

## 2020-09-27 RX ORDER — METRONIDAZOLE 500 MG/1
500 TABLET ORAL EVERY 8 HOURS
Status: DISCONTINUED | OUTPATIENT
Start: 2020-09-27 | End: 2020-10-13 | Stop reason: HOSPADM

## 2020-09-27 RX ADMIN — ENOXAPARIN SODIUM 30 MG: 40 INJECTION SUBCUTANEOUS at 05:09

## 2020-09-27 RX ADMIN — MICAFUNGIN SODIUM 100 MG: 20 INJECTION, POWDER, LYOPHILIZED, FOR SOLUTION INTRAVENOUS at 12:09

## 2020-09-27 RX ADMIN — PANTOPRAZOLE SODIUM 40 MG: 40 INJECTION, POWDER, LYOPHILIZED, FOR SOLUTION INTRAVENOUS at 08:09

## 2020-09-27 RX ADMIN — LOPERAMIDE HYDROCHLORIDE 2 MG: 2 CAPSULE ORAL at 09:09

## 2020-09-27 RX ADMIN — Medication 10 ML: at 12:09

## 2020-09-27 RX ADMIN — METRONIDAZOLE 500 MG: 500 TABLET ORAL at 09:09

## 2020-09-27 RX ADMIN — ACETAMINOPHEN 650 MG: 325 TABLET ORAL at 09:09

## 2020-09-27 RX ADMIN — INSULIN ASPART 2 UNITS: 100 INJECTION, SOLUTION INTRAVENOUS; SUBCUTANEOUS at 06:09

## 2020-09-27 RX ADMIN — INSULIN ASPART 2 UNITS: 100 INJECTION, SOLUTION INTRAVENOUS; SUBCUTANEOUS at 05:09

## 2020-09-27 RX ADMIN — INSULIN ASPART 2 UNITS: 100 INJECTION, SOLUTION INTRAVENOUS; SUBCUTANEOUS at 10:09

## 2020-09-27 RX ADMIN — METOPROLOL TARTRATE 25 MG: 25 TABLET, FILM COATED ORAL at 09:09

## 2020-09-27 RX ADMIN — PIPERACILLIN SODIUM AND TAZOBACTAM SODIUM 4.5 G: 4; .5 INJECTION, POWDER, LYOPHILIZED, FOR SOLUTION INTRAVENOUS at 05:09

## 2020-09-27 RX ADMIN — Medication 10 ML: at 05:09

## 2020-09-27 RX ADMIN — Medication 10 ML: at 06:09

## 2020-09-27 RX ADMIN — CEFEPIME 1 G: 1 INJECTION, POWDER, FOR SOLUTION INTRAMUSCULAR; INTRAVENOUS at 05:09

## 2020-09-27 RX ADMIN — METOPROLOL TARTRATE 25 MG: 25 TABLET, FILM COATED ORAL at 08:09

## 2020-09-27 RX ADMIN — LOPERAMIDE HYDROCHLORIDE 2 MG: 2 CAPSULE ORAL at 08:09

## 2020-09-27 RX ADMIN — FLUCONAZOLE 400 MG: 2 INJECTION, SOLUTION INTRAVENOUS at 02:09

## 2020-09-27 RX ADMIN — I.V. FAT EMULSION 250 ML: 20 EMULSION INTRAVENOUS at 09:09

## 2020-09-27 RX ADMIN — EPOETIN ALFA-EPBX 20000 UNITS: 10000 INJECTION, SOLUTION INTRAVENOUS; SUBCUTANEOUS at 05:09

## 2020-09-27 RX ADMIN — PIPERACILLIN SODIUM AND TAZOBACTAM SODIUM 4.5 G: 4; .5 INJECTION, POWDER, LYOPHILIZED, FOR SOLUTION INTRAVENOUS at 01:09

## 2020-09-27 NOTE — PT/OT/SLP PROGRESS
Physical Therapy Treatment    Patient Name:  Edita Riley   MRN:  [id number]    Recommendations:     Discharge Recommendations:  nursing facility, skilled   Discharge Equipment Recommendations: bath bench, walker, rolling, wheelchair   Barriers to discharge: Decreased caregiver support    Assessment:     Edita Riley is a 55 y.o. female admitted with a medical diagnosis of Bilateral ureteral obstruction.  She presents with the following impairments/functional limitations:  weakness, impaired endurance, impaired self care skills, impaired functional mobilty, gait instability, impaired balance, decreased lower extremity function, impaired cardiopulmonary response to activity. Patient continues to make progress with therapy, very motivated.     Rehab Prognosis: Good; patient would benefit from acute skilled PT services to address these deficits and reach maximum level of function.    Recent Surgery: Procedure(s):  COLECTOMY, RIGHT Extended  CREATION, ILEOSTOMY 10 Days Post-Op    Plan:     During this hospitalization, patient to be seen 6 x/week to address the identified rehab impairments via gait training, therapeutic activities, therapeutic exercises and progress toward the following goals:    · Plan of Care Expires:  10/21/20    Subjective     Chief Complaint: fatigue  Patient/Family Comments/goals: Agreeable to therapy  Pain/Comfort:  · Pain Rating 1: 0/10      Objective:     Communicated with nurse prior to session.  Patient found supine with telemetry, colostomy, peripheral IV, nephrostomy upon PT entry to room.     General Precautions: Standard, fall   Orthopedic Precautions:N/A   Braces: N/A     Functional Mobility:  · Bed Mobility:     · Supine to Sit: minimum assistance  · Transfers:     · Sit to Stand:  stand by assistance and contact guard assistance with rolling walker  · Gait: 55',55',65' with RW with cga and chair f/u. slow karma, small step length, seated rests every each trial  for apprx 5 min      AM-PAC 6 CLICK MOBILITY  Turning over in bed (including adjusting bedclothes, sheets and blankets)?: 3  Sitting down on and standing up from a chair with arms (e.g., wheelchair, bedside commode, etc.): 3  Moving from lying on back to sitting on the side of the bed?: 3  Moving to and from a bed to a chair (including a wheelchair)?: 3  Need to walk in hospital room?: 3  Climbing 3-5 steps with a railing?: 2  Basic Mobility Total Score: 17       Therapeutic Activities and Exercises:   white board updated  Good motivation      Patient left up in chair with all lines intact, call button in reach and nurse notified..    GOALS:   Multidisciplinary Problems     Physical Therapy Goals        Problem: Physical Therapy Goal    Goal Priority Disciplines Outcome Goal Variances Interventions   Physical Therapy Goal     PT, PT/OT Ongoing, Progressing     Description: Patient re-evaluated 20 s/p further surgery.  Goals to be met by: 10/5/2020    Patient will increase functional independence with mobility by performin. Supine to sit with Set-up [place]  2. Sit to supine with Set-up [place]  3. Sit to stand transfer with Supervision  4. Bed to chair transfer with Supervision using Rolling Walker  5. Gait  x 120 feet with Stand-by Assistance using Rolling Walker.   6. Ascend/descend 3 stair with left Handrails Stand-by Assistance                   Time Tracking:     PT Received On: 20  PT Start Time: 938     PT Stop Time: 1026  PT Total Time (min): 48 min     Billable Minutes: Gait Training 38 and Therapeutic Activity 10    Treatment Type: Treatment  PT/PTA: PTA     PTA Visit Number: Alcira Boston, PTA  2020

## 2020-09-27 NOTE — ASSESSMENT & PLAN NOTE
S/p urinary diversion with colon conduit 9/11   S/p emergent ex-lap 9/17, colo-colic anastomosis intact, bowel perforation found, underwent resection of right colon, remaining transverses colon, and proximal descending colon, ileostomy creation, Charlotte's pouch created     - CRS primary  - regular diet per Primary  - continue PT/OT, OOBTC as tolerated, encourage ambulation  - Wound care ostomy consulted for assistance with ostomy teaching   - patient is a [employer]; Hgb at 7.1, management per primary and Heme-Onc (on EPO, B12, and iron)  - Drains: maintain, IR drain (Cr 0.7), ureteral stents  - Blood culture with yeast: on diflucan, ID consulted  - Prophylaxis: IS, SCDs, GI ppx

## 2020-09-27 NOTE — HPI
Edita Hardin [facility] is a 55 y.o. female with a history of cervical cancer s/p pelvic radiation, admitted since 09/11 s/p open transverse colon conduit urinary diversion on 9/11 c/b feculent drainage via abdominal drain on 9/17 CT scan showed large volume pneumoperitoneum and free fluid concerning for colocolonic anastomotic leak. Pt taken back to OR on 9/17.  Focal area of perforation 5cm distal to colo-colic anastomosis was found.  Right colon, remaining transverse, and proximal descending colon resected,End-ileostomy matured, Long sánchez's pouch left. 09/22 CT A/P repeated due to raising wbc which showed a moderate volume of fluid and air within the abdomen, IR placed placed RLQ drain 09/23 started spiking fever 09/24- she has been on Zosyn since 09/17 and fluconazole added 09/26 for bld clx growing yeast 09/25. ID consulted for fungemia.

## 2020-09-27 NOTE — PLAN OF CARE
POC reviewed with pt, pt verbalized understanding. Pt is AAOx4. VSS. No falls or injury noted this shift. Drain care done and output documented. Pt stated had no complaint of pain. Blood glucose monitoring done. Frequent checks made for pt safety and care. Safety precautions remain, bed in lowest position, side rails up x2, call light within reach. Will continue to monitor.

## 2020-09-27 NOTE — SUBJECTIVE & OBJECTIVE
Past Medical History:   Diagnosis Date    Abnormal mammogram 8/25/2020    Anxiety     Cervical cancer 2014    Chronic back pain     Depression     Diarrhea due to malabsorption 11/14/2018    Fibromyalgia     Generalized abdominal pain 8/25/2020    GERD (gastroesophageal reflux disease)     Hiatal hernia 2014    History of cervical cancer 10/11/2018    History of cervical cancer 10/11/2018    Hx of psychiatric care     Cymbalta, trazodone    Hypertension     Hypomagnesemia 11/21/2018    Lactose intolerance     Metastatic squamous cell carcinoma to lymph node 10/11/2018    Nephrostomy tube displaced     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 drug 11/14/2018    Osteoarthritis of back     Psychiatric problem     Stroke 1972       Past Surgical History:   Procedure Laterality Date    BILATERAL OOPHORECTOMY  2015    CHOLECYSTECTOMY  11/09/2016    Done at [facility], path showed chronic cholecystitis and gallstones    cold knife conization  2014    COLECTOMY, RIGHT  9/17/2020    Procedure: COLECTOMY, RIGHT Extended;  Surgeon: Hammad Reynolds MD;  [facility];  Service: Colon and Rectal;;    COLONOSCOPY  2014    COLONOSCOPY N/A 10/24/2016    at [facility], Dr Gutiérrez    COLONOSCOPY N/A 5/18/2018    Procedure: COLONOSCOPY;  Surgeon: Arden Gutiérrez MD;  Location: [facility] (4TH FLR);  Service: Endoscopy;  Laterality: N/A;    COLONOSCOPY N/A 7/28/2020    Procedure: COLONOSCOPY;  Surgeon: Hammad Reynolds MD;  Location: [facility] (4TH FLR);  Service: Colon and Rectal;  Laterality: N/A;  covid test [place] 7/25    CYSTOSCOPY WITH URETEROSCOPY, RETROGRADE PYELOGRAPHY, AND INSERTION OF STENT Bilateral 3/21/2020    Procedure: CYSTOSCOPY, WITH RETROGRADE PYELOGRAM,;  Surgeon: Leslie Balbuena MD;  Location: [facility] OR [facility] FLR;  Service: Urology;  Laterality: Bilateral;    ESOPHAGOGASTRODUODENOSCOPY  2014    HYSTERECTOMY  2014    [facility] for cervical cancer    ILEOSTOMY  9/17/2020    Procedure:  CREATION, ILEOSTOMY;  Surgeon: Hammad Reynolds MD;  Location: [facility] OR 2ND FLR;  Service: Colon and Rectal;;    MOBILIZATION OF SPLENIC FLEXURE N/A 9/11/2020    Procedure: MOBILIZATION, COLONIC;  Surgeon: Hammad Reynolds MD;  Location: [facility] OR 2ND FLR;  Service: Colon and Rectal;  Laterality: N/A;    OOPHORECTOMY      RETROPERITONEAL LYMPHADENECTOMY Right 9/17/2018    Procedure: LYMPHADENECTOMY, RETROPERITONEUM;  Surgeon: Sebas Reed MD;  Location: [facility] OR 2ND FLR;  Service: General;  Laterality: Right;  ILIAC       Review of patient's allergies indicates:   Allergen Reactions    Bee sting [allergen ext-venom-honey bee]      Rash      Grass pollen-bermuda, standard      rash       Medications:  Medications Prior to Admission   Medication Sig    dicyclomine (BENTYL) 20 mg tablet Take 1 tablet (20 mg total) by mouth 3 (three) times daily as needed. (Patient taking differently: Take 20 mg by mouth 3 (three) times daily as needed. Take if needed)    gabapentin (NEURONTIN) 300 MG capsule Take 1 capsule (300 mg total) by mouth 3 (three) times daily. (Patient taking differently: Take 300 mg by mouth 3 (three) times daily. Take in am of surgery)    ketoconazole (NIZORAL) 2 % cream Apply topically once daily to affected area (Patient taking differently: Apply topically once daily. Hold am of surgery)    metroNIDAZOLE (FLAGYL) 500 MG tablet Take 1 tablet (500 mg total) by mouth As instructed. Take 1 tablet at 1pm, 2pm, 11pm the day before surgery (Patient taking differently: Take 500 mg by mouth As instructed. Take 1 tablet at 1pm, 2pm, 11pm the day before surgery  Take per surgeons instructions)    neomycin (MYCIFRADIN) 500 mg Tab Take 2 tablets (1,000 mg total) by mouth As instructed. Take 2 tablets at 1pm, 2pm, 11pm the day before surgery (Patient taking differently: Take 1,000 mg by mouth As instructed. Take 2 tablets at 1pm, 2pm, 11pm the day before surgery.  Take per surgeons instructions)    omeprazole  (PRILOSEC) 40 MG capsule Take 1 capsule (40 mg total) by mouth once daily. (Patient taking differently: Take 40 mg by mouth once daily. Take in am of surgery)    oxyCODONE (ROXICODONE) 5 MG immediate release tablet Take 1 tablet (5 mg total) by mouth every 8 (eight) hours as needed for Pain. (Patient taking differently: Take 5 mg by mouth every 8 (eight) hours as needed for Pain. Take if needed)    polyethylene glycol (GLYCOLAX) 17 gram/dose powder Take 17 g by mouth once daily. (Patient taking differently: Take 17 g by mouth once daily. Hold am of surgery)    traZODone (DESYREL) 50 MG tablet TAKE 1 TABLET (50 MG TOTAL) BY MOUTH EVERY EVENING. (Patient taking differently: Take 50 mg by mouth every evening. Takes at night)    ondansetron (ZOFRAN-ODT) 4 MG TbDL Take 1 tablet (4 mg total) by mouth every 8 (eight) hours as needed (nausea or vomiting). (Patient taking differently: Take 4 mg by mouth every 8 (eight) hours as needed (nausea or vomiting). Take if needed)     Antibiotics (From admission, onward)    Start     Stop Route Frequency Ordered    09/17/20 1730  piperacillin-tazobactam 4.5 g in sodium chloride 0.9% 100 mL IVPB (ready to mix system)      -- IV Every 8 hours (non-standard times) 09/17/20 1610        Antifungals (From admission, onward)    Start     Stop Route Frequency Ordered    09/27/20 1100  micafungin 100 mg in sodium chloride 0.9 % 100 mL IVPB (ready to mix system)      -- IV Every 24 hours (non-standard times) 09/27/20 1009        Antivirals (From admission, onward)    None           Immunization History   Administered Date(s) Administered    Influenza - Quadrivalent - PF *Preferred* (6 months and older) 11/16/2017, 09/19/2019    Tdap 03/28/2017       Family History     Problem Relation (Age of Onset)    Breast cancer Maternal Aunt    Cancer Father, Mother    Cervical cancer Mother    Colon cancer Father    Drug abuse Daughter, Daughter    Esophageal cancer Father    Heart disease Sister,  Maternal Grandmother    Suicide Daughter        Social History     Socioeconomic History    Marital status:      Spouse name: Hammad    Number of children: 2    Years of education: Not on file    Highest education level: Not on file   Occupational History    Not on file   Social Needs    Financial resource strain: Not on file    Food insecurity     Worry: Not on file     Inability: Not on file    Transportation needs     Medical: Not on file     Non-medical: Not on file   Tobacco Use    Smoking status: Former Smoker    Smokeless tobacco: Never Used   Substance and Sexual Activity    Alcohol use: No     Alcohol/week: 0.0 standard drinks    Drug use: No    Sexual activity: Yes     Partners: Male     Birth control/protection: None     Comment:  19 years since    Lifestyle    Physical activity     Days per week: Not on file     Minutes per session: Not on file    Stress: Not on file   Relationships    Social connections     Talks on phone: Not on file     Gets together: Not on file     Attends Orthodox service: Not on file     Active member of club or organization: Not on file     Attends meetings of clubs or organizations: Not on file     Relationship status: Not on file   Other Topics Concern    Patient feels they ought to cut down on drinking/drug use Not Asked    Patient annoyed by others criticizing their drinking/drug use Not Asked    Patient has felt bad or guilty about drinking/drug use Not Asked    Patient has had a drink/used drugs as an eye opener in the AM Not Asked   Social History Narrative    , twin daughters (1  2018), disabled due to childhood stroke, [employer]ia     Review of Systems   Constitutional: Positive for chills and fever.   Respiratory: Negative for cough and shortness of breath.    Gastrointestinal: Negative for diarrhea, nausea and vomiting.   Genitourinary: Negative for dysuria and hematuria.   Psychiatric/Behavioral:  Negative for agitation.     Objective:     Vital Signs (Most Recent):  Temp: 98.6 °F (37 °C) (09/27/20 0738)  Pulse: 99 (09/27/20 0738)  Resp: 20 (09/27/20 0738)  BP: 125/77 (09/27/20 0738)  SpO2: 97 % (09/27/20 0738) Vital Signs (24h Range):  Temp:  [98.2 °F (36.8 °C)-100.9 °F (38.3 °C)] 98.6 °F (37 °C)  Pulse:  [] 99  Resp:  [18-20] 20  SpO2:  [96 %-99 %] 97 %  BP: (118-142)/(57-84) 125/77     Weight: 88.9 kg (196 lb)  Body mass index is 28.94 kg/m².    Estimated Creatinine Clearance: 36.9 mL/min (A) (based on SCr of 2 mg/dL (H)).    Physical Exam  Constitutional:       Appearance: She is well-developed.   HENT:      Head: Normocephalic.   Cardiovascular:      Rate and Rhythm: Normal rate and regular rhythm.      Heart sounds: Normal heart sounds. No murmur.   Pulmonary:      Effort: Pulmonary effort is normal.      Breath sounds: Normal breath sounds.   Abdominal:      Palpations: Abdomen is soft.      Comments: Urostomy + Ileostomy   IR drain RLQ draining light yellow clear fluid  Midline incision with staples with wound vac in     Musculoskeletal: Normal range of motion.   Neurological:      Mental Status: She is alert and oriented to person, place, and time.   Psychiatric:         Behavior: Behavior normal.         Significant Labs: All pertinent labs within the past 24 hours have been reviewed.    Significant Imaging: I have reviewed all pertinent imaging results/findings within the past 24 hours.

## 2020-09-27 NOTE — CONSULTS
[facility]  Infectious Disease  Consult Note    Patient Name: Edita Riley  MRN: [id number]  Admission Date: 9/11/2020  Hospital Length of Stay: 16 days  Attending Physician: Hammad Reynolds MD  Primary Care Provider: Audrey Mustafa MD     Isolation Status: No active isolations    Patient information was obtained from patient, past medical records and ER records.      Inpatient consult to Infectious Diseases  Consult performed by: Marie Meneses MD  Consult ordered by: Nolan Hartman MD        Assessment/Plan:     Fungemia  Edita Riley is a 55 y.o. female with a history of cervical cancer s/p pelvic radiation, admitted since 09/11 s/p open transverse colon conduit urinary diversion on 9/11 c/b feculent drainage via abdominal drain on 9/17 CT scan showed large volume pneumoperitoneum and free fluid concerning for colocolonic anastomotic leak. Pt taken back to OR on 9/17.  Focal area of perforation 5cm distal to colo-colic anastomosis was found.  Right colon, remaining transverse, and proximal descending colon resected,End-ileostomy matured, Long sánchez's pouch left. 09/22 CT A/P repeated due to raising wbc which showed a moderate volume of fluid and air within the abdomen, IR placed placed RLQ drain 09/23 started spiking fever 09/24- she has been on Zosyn since 09/17 and fluconazole added 09/26 for bld clx growing yeast 09/25.    Micro:  Abdominal fluid 09/17: Enterobacter cloacae + E.hormaechei + Bacteroides + solobacterium + Klebsiella oxytoca.    Bcx 09/25: 1/4 yeast    Abx:  Zosyn 09/17  Fluconazole x1 09/27    Recommendations:    · Remove PICC line, would hold of placing another line until clx clears.  · Switch Fluconazole to Micafungin for broader coverage until speciation of yeats.  · Ophthalmology evaluation for fungal endophthalmitis.  · Echo to r/o vegetation.  · Consider switching Zosyn to cefepime and flagyl ( enterobacter is one several organisms w/  inducible chromosomal AmpC ß-lactamase genes conferring in vivo ß-lactam resistance despite apparent sensitivity in vitro)  · Repeat Blood clx after PICC line removed.        Thank you for your consult. I will follow-up with patient. Please contact us if you have any additional questions.    Marie Meneses MD  Infectious Disease  [facility]    Subjective:     Principal Problem: Bilateral ureteral obstruction    HPI: dEita Riley is a 55 y.o. female with a history of cervical cancer s/p pelvic radiation, admitted since 09/11 s/p open transverse colon conduit urinary diversion on 9/11 c/b feculent drainage via abdominal drain on 9/17 CT scan showed large volume pneumoperitoneum and free fluid concerning for colocolonic anastomotic leak. Pt taken back to OR on 9/17.  Focal area of perforation 5cm distal to colo-colic anastomosis was found.  Right colon, remaining transverse, and proximal descending colon resected,End-ileostomy matured, Long sánchez's pouch left. 09/22 CT A/P repeated due to raising wbc which showed a moderate volume of fluid and air within the abdomen, IR placed placed RLQ drain 09/23 started spiking fever 09/24- she has been on Zosyn since 09/17 and fluconazole added 09/26 for bld clx growing yeast 09/25. ID consulted for fungemia.    Past Medical History:   Diagnosis Date    Abnormal mammogram 8/25/2020    Anxiety     Cervical cancer 2014    Chronic back pain     Depression     Diarrhea due to malabsorption 11/14/2018    Fibromyalgia     Generalized abdominal pain 8/25/2020    GERD (gastroesophageal reflux disease)     Hiatal hernia 2014    History of cervical cancer 10/11/2018    History of cervical cancer 10/11/2018    Hx of psychiatric care     Cymbalta, trazodone    Hypertension     Hypomagnesemia 11/21/2018    Lactose intolerance     Metastatic squamous cell carcinoma to lymph node 10/11/2018    Nephrostomy tube displaced     Neuropathy  due to chemotherapeutic drug 11/14/2018    Osteoarthritis of back     Psychiatric problem     Stroke 1972       Past Surgical History:   Procedure Laterality Date    BILATERAL OOPHORECTOMY  2015    CHOLECYSTECTOMY  11/09/2016    Done at [facility], path showed chronic cholecystitis and gallstones    cold knife conization  2014    COLECTOMY, RIGHT  9/17/2020    Procedure: COLECTOMY, RIGHT Extended;  Surgeon: Hammad Reynolds MD;  Location: [facility] OR 2ND FLR;  Service: Colon and Rectal;;    COLONOSCOPY  2014    [facility]    COLONOSCOPY N/A 5/18/2018    Procedure: COLONOSCOPY;  Surgeon: Arden Gutiérrez MD;  Location: [facility] ENDO (4TH FLR);  Service: Endoscopy;  Laterality: N/A;    COLONOSCOPY N/A 7/28/2020    Procedure: COLONOSCOPY;  Surgeon: Hammad Reynolds MD;  Location: [facility] ENDO (4TH FLR);  Service: Colon and Rectal;  Laterality: N/A;  covid test [place] 7/25    CYSTOSCOPY WITH URETEROSCOPY, RETROGRADE PYELOGRAPHY, AND INSERTION OF STENT Bilateral 3/21/2020    Procedure: CYSTOSCOPY, WITH RETROGRADE PYELOGRAM,;  Surgeon: Leslie Balbuena MD;  Location: [facility] OR 1ST FLR;  Service: Urology;  Laterality: Bilateral;    ESOPHAGOGASTRODUODENOSCOPY  2014    HYSTERECTOMY  2014    TVH for cervical cancer    ILEOSTOMY  9/17/2020    Procedure: CREATION, ILEOSTOMY;  Surgeon: Hammad Reynolds MD;  Location: [facility] OR 2ND FLR;  Service: Colon and Rectal;;    MOBILIZATION OF SPLENIC FLEXURE N/A 9/11/2020    Procedure: MOBILIZATION, COLONIC;  Surgeon: Hammad Reynolds MD;  Location: [facility] OR 2ND FLR;  Service: Colon and Rectal;  Laterality: N/A;    OOPHORECTOMY      RETROPERITONEAL LYMPHADENECTOMY Right 9/17/2018    Procedure: LYMPHADENECTOMY, RETROPERITONEUM;  Surgeon: Sebas Reed MD;  Location: [facility] OR 2ND FLR;  Service: General;  Laterality: Right;  ILIAC       Review of patient's allergies indicates:   Allergen Reactions    Bee sting [allergen ext-venom-honey bee]      Rash       Grass pollen-bermuda, standard      rash       Medications:  Medications Prior to Admission   Medication Sig    dicyclomine (BENTYL) 20 mg tablet Take 1 tablet (20 mg total) by mouth 3 (three) times daily as needed. (Patient taking differently: Take 20 mg by mouth 3 (three) times daily as needed. Take if needed)    gabapentin (NEURONTIN) 300 MG capsule Take 1 capsule (300 mg total) by mouth 3 (three) times daily. (Patient taking differently: Take 300 mg by mouth 3 (three) times daily. Take in am of surgery)    ketoconazole (NIZORAL) 2 % cream Apply topically once daily to affected area (Patient taking differently: Apply topically once daily. Hold am of surgery)    metroNIDAZOLE (FLAGYL) 500 MG tablet Take 1 tablet (500 mg total) by mouth As instructed. Take 1 tablet at 1pm, 2pm, 11pm the day before surgery (Patient taking differently: Take 500 mg by mouth As instructed. Take 1 tablet at 1pm, 2pm, 11pm the day before surgery  Take per surgeons instructions)    neomycin (MYCIFRADIN) 500 mg Tab Take 2 tablets (1,000 mg total) by mouth As instructed. Take 2 tablets at 1pm, 2pm, 11pm the day before surgery (Patient taking differently: Take 1,000 mg by mouth As instructed. Take 2 tablets at 1pm, 2pm, 11pm the day before surgery.  Take per surgeons instructions)    omeprazole (PRILOSEC) 40 MG capsule Take 1 capsule (40 mg total) by mouth once daily. (Patient taking differently: Take 40 mg by mouth once daily. Take in am of surgery)    oxyCODONE (ROXICODONE) 5 MG immediate release tablet Take 1 tablet (5 mg total) by mouth every 8 (eight) hours as needed for Pain. (Patient taking differently: Take 5 mg by mouth every 8 (eight) hours as needed for Pain. Take if needed)    polyethylene glycol (GLYCOLAX) 17 gram/dose powder Take 17 g by mouth once daily. (Patient taking differently: Take 17 g by mouth once daily. Hold am of surgery)    traZODone (DESYREL) 50 MG tablet TAKE 1 TABLET (50 MG TOTAL) BY MOUTH EVERY  EVENING. (Patient taking differently: Take 50 mg by mouth every evening. Takes at night)    ondansetron (ZOFRAN-ODT) 4 MG TbDL Take 1 tablet (4 mg total) by mouth every 8 (eight) hours as needed (nausea or vomiting). (Patient taking differently: Take 4 mg by mouth every 8 (eight) hours as needed (nausea or vomiting). Take if needed)     Antibiotics (From admission, onward)    Start     Stop Route Frequency Ordered    09/17/20 1730  piperacillin-tazobactam 4.5 g in sodium chloride 0.9% 100 mL IVPB (ready to mix system)      -- IV Every 8 hours (non-standard times) 09/17/20 1610        Antifungals (From admission, onward)    Start     Stop Route Frequency Ordered    09/27/20 1100  micafungin 100 mg in sodium chloride 0.9 % 100 mL IVPB (ready to mix system)      -- IV Every 24 hours (non-standard times) 09/27/20 1009        Antivirals (From admission, onward)    None           Immunization History   Administered Date(s) Administered    Influenza - Quadrivalent - PF *Preferred* (6 months and older) 11/16/2017, 09/19/2019    Tdap 03/28/2017       Family History     Problem Relation (Age of Onset)    Breast cancer Maternal Aunt    Cancer Father, Mother    Cervical cancer Mother    Colon cancer Father    Drug abuse Daughter, Daughter    Esophageal cancer Father    Heart disease Sister, Maternal Grandmother    Suicide Daughter        Social History     Socioeconomic History    Marital status:      Spouse name: Hammad    Number of children: 2    Years of education: Not on file    Highest education level: Not on file   Occupational History    Not on file   Social Needs    Financial resource strain: Not on file    Food insecurity     Worry: Not on file     Inability: Not on file    Transportation needs     Medical: Not on file     Non-medical: Not on file   Tobacco Use    Smoking status: Former Smoker    Smokeless tobacco: Never Used   Substance and Sexual Activity    Alcohol use: No     Alcohol/week: 0.0  standard drinks    Drug use: No    Sexual activity: Yes     Partners: Male     Birth control/protection: None     Comment:  19 years since    Lifestyle    Physical activity     Days per week: Not on file     Minutes per session: Not on file    Stress: Not on file   Relationships    Social connections     Talks on phone: Not on file     Gets together: Not on file     Attends Gn[employer] service: Not on file     Active member of club or organization: Not on file     Attends meetings of clubs or organizations: Not on file     Relationship status: Not on file   Other Topics Concern    Patient feels they ought to cut down on drinking/drug use Not Asked    Patient annoyed by others criticizing their drinking/drug use Not Asked    Patient has felt bad or guilty about drinking/drug use Not Asked    Patient has had a drink/used drugs as an eye opener in the AM Not Asked   Social History Narrative    , twin daughters (1  2018), disabled due to childhood stroke, [employer]     Review of Systems   Constitutional: Positive for chills and fever.   Respiratory: Negative for cough and shortness of breath.    Gastrointestinal: Negative for diarrhea, nausea and vomiting.   Genitourinary: Negative for dysuria and hematuria.   Psychiatric/Behavioral: Negative for agitation.     Objective:     Vital Signs (Most Recent):  Temp: 98.6 °F (37 °C) (20)  Pulse: 99 (20)  Resp: 20 (20)  BP: 125/77 (20)  SpO2: 97 % (20) Vital Signs (24h Range):  Temp:  [98.2 °F (36.8 °C)-100.9 °F (38.3 °C)] 98.6 °F (37 °C)  Pulse:  [] 99  Resp:  [18-20] 20  SpO2:  [96 %-99 %] 97 %  BP: (118-142)/(57-84) 125/77     Weight: 88.9 kg (196 lb)  Body mass index is 28.94 kg/m².    Estimated Creatinine Clearance: 36.9 mL/min (A) (based on SCr of 2 mg/dL (H)).    Physical Exam  Constitutional:       Appearance: She is well-developed.   HENT:      Head:  Normocephalic.   Cardiovascular:      Rate and Rhythm: Normal rate and regular rhythm.      Heart sounds: Normal heart sounds. No murmur.   Pulmonary:      Effort: Pulmonary effort is normal.      Breath sounds: Normal breath sounds.   Abdominal:      Palpations: Abdomen is soft.      Comments: Urostomy + Ileostomy   IR drain RLQ draining light yellow clear fluid  Midline incision with staples with wound vac in     Musculoskeletal: Normal range of motion.   Neurological:      Mental Status: She is alert and oriented to person, place, and time.   Psychiatric:         Behavior: Behavior normal.         Significant Labs: All pertinent labs within the past 24 hours have been reviewed.    Significant Imaging: I have reviewed all pertinent imaging results/findings within the past 24 hours.

## 2020-09-27 NOTE — SUBJECTIVE & OBJECTIVE
Subjective:     Interval History:   No acute events overnight  Doing well, denies nausea/vomiting.   Tolerating diet.   Having ostomy function  WBC downtrending 18 -> 15    Post-Op Info:  Procedure(s):  COLECTOMY, RIGHT Extended  CREATION, ILEOSTOMY   10 Days Post-Op      Medications:  Continuous Infusions:   sodium chloride 0.9% 40 mL/hr at 09/25/20 1113    TPN ADULT CENTRAL LINE CUSTOM 60 mL/hr at 09/26/20 2332    TPN ADULT CENTRAL LINE CUSTOM       Scheduled Meds:   cyanocobalamin  100 mcg Intramuscular Weekly    enoxaparin  40 mg Subcutaneous Q24H    epoetin yecenia-epbx  20,000 Units Subcutaneous Q48H    fat emulsion 20%  250 mL Intravenous Daily    fat emulsion 20%  250 mL Intravenous Daily    fluconazole (DIFLUCAN) IVPB  400 mg Intravenous Q24H    loperamide  2 mg Oral BID    metoprolol tartrate  25 mg Oral BID    pantoprazole  40 mg Intravenous Daily    piperacillin-tazobactam (ZOSYN) IVPB  4.5 g Intravenous Q8H    sodium chloride 0.9%  10 mL Intravenous Q6H     PRN Meds:   acetaminophen    dextrose 50%    glucagon (human recombinant)    HYDROmorphone    insulin aspart U-100    iohexol    iohexol    labetalol    ondansetron    oxyCODONE-acetaminophen    oxyCODONE-acetaminophen    sodium chloride 0.9%    sodium chloride 0.9%    sodium chloride 0.9%        Objective:     Vital Signs (Most Recent):  Temp: 98.6 °F (37 °C) (09/27/20 0738)  Pulse: 99 (09/27/20 0738)  Resp: 20 (09/27/20 0738)  BP: 125/77 (09/27/20 0738)  SpO2: 97 % (09/27/20 0738) Vital Signs (24h Range):  Temp:  [96.5 °F (35.8 °C)-100.9 °F (38.3 °C)] 98.6 °F (37 °C)  Pulse:  [] 99  Resp:  [18-20] 20  SpO2:  [96 %-99 %] 97 %  BP: (118-142)/(57-84) 125/77     Intake/Output - Last 3 Shifts       09/25 0700 - 09/26 0659 09/26 0700 - 09/27 0659 09/27 0700 - 09/28 0659    P.O. 480      I.V. (mL/kg) 271.3 (3.1)      Other  0     IV Piggyback 1300            Total Intake(mL/kg) 2942.3 (33.1) 0 (0)     Urine  (mL/kg/hr) 1850 (0.9) 1380 (0.6)     Drains 5 30     Other 20 120     Stool 3180 700     Total Output 5055 2230     Net -2112.7 -2230                  Physical Exam  Vitals signs and nursing note reviewed.   Constitutional:       Appearance: She is well-developed. She is not ill-appearing.   HENT:      Head: Normocephalic.   Eyes:      Pupils: Pupils are equal, round, and reactive to light.   Cardiovascular:      Rate and Rhythm: Normal rate and regular rhythm.      Heart sounds: Normal heart sounds.   Pulmonary:      Effort: Pulmonary effort is normal. No respiratory distress.      Breath sounds: Normal breath sounds. No wheezing or rales.   Abdominal:      Palpations: Abdomen is soft. There is no mass.      Tenderness: There is no guarding or rebound.      Comments: abd inc line with wound vac in place  Ileostomy functional  Urostomy functional  IR drain with minimal drainage   Skin:     General: Skin is warm and dry.   Neurological:      Mental Status: She is alert and oriented to person, place, and time.   Psychiatric:         Behavior: Behavior normal.         Thought Content: Thought content normal.         Judgment: Judgment normal.           Significant Labs:  BMP (Last 3 Results):   Recent Labs   Lab 09/25/20  0616 09/25/20  1531 09/26/20  0520 09/27/20  0444   * 167* 146* 133*    135* 135* 136   K 4.1 3.9 4.3 4.8    106 109 108   CO2 20* 20* 18* 16*   BUN 23* 23* 22* 25*   CREATININE 1.6* 1.7* 1.7* 2.0*   CALCIUM 8.0* 8.0* 7.9* 8.2*   MG 2.2  --  2.2 2.5     CBC (Last 3 Results):   Recent Labs   Lab 09/25/20  1531 09/26/20  0520 09/27/20  0444   WBC 20.70* 18.71* 15.95*   RBC 2.44* 2.37* 2.65*   HGB 6.7* 6.4* 7.1*   HCT 22.1* 21.5* 24.0*   * 483* 459*   MCV 91 91 91   MCH 27.5 27.0 26.8*   MCHC 30.3* 29.8* 29.6*       Significant Diagnostics:  None

## 2020-09-27 NOTE — PLAN OF CARE
Pt is AAOX4,VSS. Temp of 100.9 beginning of shift, prn tylenol given, now temp normal. Pt is progressing towards plan of care goals. Pain management has been assessed. Pt reports pain at a 0. Pain reassessed throughout shift. SCDs in place with frequent checks for skin breakdown. Wound vac drainage assessed. Performed drain care and output documented for each drain. Fall precautions in place, no report of falls. Safety measures are in place bed in lowest position, wheels locked, call light within reach.

## 2020-09-27 NOTE — ASSESSMENT & PLAN NOTE
Edita Hardin [facility] is a 55 y.o. female with a history of cervical cancer s/p pelvic radiation, admitted since 09/11 s/p open transverse colon conduit urinary diversion on 9/11 c/b feculent drainage via abdominal drain on 9/17 CT scan showed large volume pneumoperitoneum and free fluid concerning for colocolonic anastomotic leak. Pt taken back to OR on 9/17.  Focal area of perforation 5cm distal to colo-colic anastomosis was found.  Right colon, remaining transverse, and proximal descending colon resected,End-ileostomy matured, Long sánchez's pouch left. 09/22 CT A/P repeated due to raising wbc which showed a moderate volume of fluid and air within the abdomen, IR placed placed RLQ drain 09/23 started spiking fever 09/24- she has been on Zosyn since 09/17 and fluconazole added 09/26 for bld clx growing yeast 09/25.    Micro:  Abdominal fluid 09/17: Enterobacter cloacae + E.hormaechei + Bacteroides + solobacterium + Klebsiella oxytoca.    Bcx 09/25: 1/4 yeast    Abx:  Zosyn 09/17  Fluconazole x1 09/27    Recommendations:    · Remove PICC line, would hold of placing another line until clx clears.  · Switch Fluconazole to Micafungin for broader coverage until speciation of yeats.  · Ophthalmology evaluation for fungal endophthalmitis.  · Echo to r/o vegetation.  · Consider switching Zosyn to cefepime and flagyl.  · Repeat Blood clx after PICC line removed.

## 2020-09-27 NOTE — SUBJECTIVE & OBJECTIVE
Interval History: NAEON. Fever yesterday to Tmax 100.9. Now afebrile.  ORESTES output 30 cc. Tolerating regular diet, pain controlled, ambulating. TF running.  Midline wound with wound vac, 120 cc output.  Creatinine increased to 2.0.   Right nephrostomy tube unclamped with good UOP.  Blood culture with yeast. Added diflucan. On zosyn.  Sitting comfortably in chair. Feels great.    Objective:     Temp:  [96.5 °F (35.8 °C)-100.9 °F (38.3 °C)] 98.6 °F (37 °C)  Pulse:  [] 99  Resp:  [18-20] 20  SpO2:  [96 %-99 %] 97 %  BP: (118-142)/(57-84) 125/77     Body mass index is 28.94 kg/m².           Drains     Drain                 Nephrostomy 08/13/20 0805 Left 12 Fr. 42 days         Nephrostomy 08/13/20 0809 Right 12 Fr. 42 days         Urostomy 09/11/20 ileal conduit LLQ 14 days         Ileostomy 09/18/20 RLQ 7 days         Nephrostomy 09/18/20 0025 Left 7 days         Nephrostomy 09/18/20 0025 Right 7 days         Closed/Suction Drain 09/23/20 0720 Right Abdomen Bulb 10 Fr. 1 day                Physical Exam   Constitutional: No distress.   HENT:   Head: Normocephalic and atraumatic.   Eyes: Conjunctivae are normal. No scleral icterus.   Neck: Normal range of motion.   Cardiovascular: Normal rate.    Pulmonary/Chest: Effort normal. No respiratory distress.   Abdominal: Soft. She exhibits no distension. There is abdominal tenderness (appropriate). There is no rebound and no guarding.   Urostomy p/p/p draining clear yellow urine  Ureteral stents in place  Ileostomy with dark liquid fluid output  IR drain LLQ draining light yellow clear fluid  Midline incision with staples, opened at superior and inferior ends with wound vac in    Musculoskeletal: Normal range of motion.      Comments: SCDs in place   Neurological: She is alert.   Skin: Skin is warm and dry. She is not diaphoretic.         Significant Labs:    BMP:  Recent Labs   Lab 09/25/20  1531 09/26/20  0520 09/27/20  0444   * 135* 136   K 3.9 4.3 4.8     109 108   CO2 20* 18* 16*   BUN 23* 22* 25*   CREATININE 1.7* 1.7* 2.0*   CALCIUM 8.0* 7.9* 8.2*       CBC:   Recent Labs   Lab 09/25/20  1531 09/26/20  0520 09/27/20  0444   WBC 20.70* 18.71* 15.95*   HGB 6.7* 6.4* 7.1*   HCT 22.1* 21.5* 24.0*   * 483* 459*       All pertinent labs results from the past 24 hours have been reviewed.    Significant Imaging:  All pertinent imaging results/findings from the past 24 hours have been reviewed.

## 2020-09-27 NOTE — PROGRESS NOTES
[facility]  General Surgery  Progress Note    Subjective:     History of Present Illness:  No notes on file    Post-Op Info:  Procedure(s):  COLECTOMY, RIGHT Extended  CREATION, ILEOSTOMY   10 Days Post-Op       Subjective:     Interval History:   No acute events overnight  Doing well, denies nausea/vomiting.   Tolerating diet.   Having ostomy function  WBC downtrending 18 -> 15    Post-Op Info:  Procedure(s):  COLECTOMY, RIGHT Extended  CREATION, ILEOSTOMY   10 Days Post-Op      Medications:  Continuous Infusions:   sodium chloride 0.9% 40 mL/hr at 09/25/20 1113    TPN ADULT CENTRAL LINE CUSTOM 60 mL/hr at 09/26/20 2332    TPN ADULT CENTRAL LINE CUSTOM       Scheduled Meds:   cyanocobalamin  100 mcg Intramuscular Weekly    enoxaparin  40 mg Subcutaneous Q24H    epoetin yecenia-epbx  20,000 Units Subcutaneous Q48H    fat emulsion 20%  250 mL Intravenous Daily    fat emulsion 20%  250 mL Intravenous Daily    fluconazole (DIFLUCAN) IVPB  400 mg Intravenous Q24H    loperamide  2 mg Oral BID    metoprolol tartrate  25 mg Oral BID    pantoprazole  40 mg Intravenous Daily    piperacillin-tazobactam (ZOSYN) IVPB  4.5 g Intravenous Q8H    sodium chloride 0.9%  10 mL Intravenous Q6H     PRN Meds:   acetaminophen    dextrose 50%    glucagon (human recombinant)    HYDROmorphone    insulin aspart U-100    iohexol    iohexol    labetalol    ondansetron    oxyCODONE-acetaminophen    oxyCODONE-acetaminophen    sodium chloride 0.9%    sodium chloride 0.9%    sodium chloride 0.9%        Objective:     Vital Signs (Most Recent):  Temp: 98.6 °F (37 °C) (09/27/20 0738)  Pulse: 99 (09/27/20 0738)  Resp: 20 (09/27/20 0738)  BP: 125/77 (09/27/20 0738)  SpO2: 97 % (09/27/20 0738) Vital Signs (24h Range):  Temp:  [96.5 °F (35.8 °C)-100.9 °F (38.3 °C)] 98.6 °F (37 °C)  Pulse:  [] 99  Resp:  [18-20] 20  SpO2:  [96 %-99 %] 97 %  BP: (118-142)/(57-84) 125/77     Intake/Output - Last 3 Shifts        09/25 0700 - 09/26 0659 09/26 0700 - 09/27 0659 09/27 0700 - 09/28 0659    P.O. 480      I.V. (mL/kg) 271.3 (3.1)      Other  0     IV Piggyback 1300            Total Intake(mL/kg) 2942.3 (33.1) 0 (0)     Urine (mL/kg/hr) 1850 (0.9) 1380 (0.6)     Drains 5 30     Other 20 120     Stool 3180 700     Total Output 5055 2230     Net -2112.7 -2230                  Physical Exam  Vitals signs and nursing note reviewed.   Constitutional:       Appearance: She is well-developed. She is not ill-appearing.   HENT:      Head: Normocephalic.   Eyes:      Pupils: Pupils are equal, round, and reactive to light.   Cardiovascular:      Rate and Rhythm: Normal rate and regular rhythm.      Heart sounds: Normal heart sounds.   Pulmonary:      Effort: Pulmonary effort is normal. No respiratory distress.      Breath sounds: Normal breath sounds. No wheezing or rales.   Abdominal:      Palpations: Abdomen is soft. There is no mass.      Tenderness: There is no guarding or rebound.      Comments: abd inc line with wound vac in place  Ileostomy functional  Urostomy functional  IR drain with minimal drainage   Skin:     General: Skin is warm and dry.   Neurological:      Mental Status: She is alert and oriented to person, place, and time.   Psychiatric:         Behavior: Behavior normal.         Thought Content: Thought content normal.         Judgment: Judgment normal.           Significant Labs:  BMP (Last 3 Results):   Recent Labs   Lab 09/25/20  0616 09/25/20  1531 09/26/20  0520 09/27/20  0444   * 167* 146* 133*    135* 135* 136   K 4.1 3.9 4.3 4.8    106 109 108   CO2 20* 20* 18* 16*   BUN 23* 23* 22* 25*   CREATININE 1.6* 1.7* 1.7* 2.0*   CALCIUM 8.0* 8.0* 7.9* 8.2*   MG 2.2  --  2.2 2.5     CBC (Last 3 Results):   Recent Labs   Lab 09/25/20  1531 09/26/20  0520 09/27/20  0444   WBC 20.70* 18.71* 15.95*   RBC 2.44* 2.37* 2.65*   HGB 6.7* 6.4* 7.1*   HCT 22.1* 21.5* 24.0*   * 483* 459*   MCV 91  91 91   MCH 27.5 27.0 26.8*   MCHC 30.3* 29.8* 29.6*       Significant Diagnostics:  None    Assessment/Plan:     * Bilateral ureteral obstruction  Ms. Riley is a 55 y.o. female with b/l ureteral obstruction s/p transverse colon conduit on 9/11/2020. Extended R colectomy and ileostomy creation on 9/17.      - Diet regular  - Continue TPN  - Zosyn  -  IR drain placed 9/23  - OOB, activity as tolerated  - PT/OT    Wound infection after surgery  Wound vac  Continue zosyn    Hypokalemia  Will replace potassium and monitor electrolytes closely.     Acute blood loss anemia  Chronic anemia due to chronic dx and acute blood loss anemia r/t surgery  Monitor labs  No blood due to religiouos belief  Iron IV     Peritonitis  The patient is a 55-year-old female who is now 1 week out from a urinary diversion by Dr Balbuena due to bladder outlet obstruction from prior pelvic radiation therapy.  At time of that procedure, I procured a vascularized segment of transverse colon to be used as the urinary conduit, as requested by Urology.  A primary end-to-end, hand-sewn colocolonic anastomosis was performed at that time to restore intestinal continuity once the segment of colon to be used for the conduit had been excluded.  The patient did well for the 1st few days postoperatively but then began to develop abdominal pain and distention consistent with an ileus.  This morning, there was small amount of pneumoperitoneum seen on a plain abdominal film, and later today she began to drain feculent appearing fluid through her Kurtis-Arias drain.  She had a normal white blood cell count and did not have peritonitis on examination, so a CT scan was performed, which demonstrated a large volume of free abdominal fluid as well as a large volume of pneumoperitoneum, concerning for an anastomotic leak.  She is being brought back to the operating room for urgent exploratory laparotomy.    OR 9/17/2020     Cont TPN  Consult wound care for  ileosotmy  Naseem vaughan, scd  Enc amb and IS  casandra smal amt of diet    Essential hypertension  Will utilize p.r.n. blood pressure medication only if patient's blood pressure greater than  180/110 and she develops symptoms such as worsening chest pain or shortness of breath.      Cecile Francisco MD  General Surgery  [facility]

## 2020-09-28 LAB
ALBUMIN SERPL BCP-MCNC: 1.7 G/DL (ref 3.5–5.2)
ALP SERPL-CCNC: 101 U/L (ref 55–135)
ALT SERPL W/O P-5'-P-CCNC: 10 U/L (ref 10–44)
ANION GAP SERPL CALC-SCNC: 7 MMOL/L (ref 8–16)
AST SERPL-CCNC: 12 U/L (ref 10–40)
BACTERIA #/AREA URNS AUTO: ABNORMAL /HPF
BASOPHILS # BLD AUTO: 0.03 K/UL (ref 0–0.2)
BASOPHILS NFR BLD: 0.2 % (ref 0–1.9)
BILIRUB SERPL-MCNC: 0.3 MG/DL (ref 0.1–1)
BILIRUB UR QL STRIP: NEGATIVE
BUN SERPL-MCNC: 23 MG/DL (ref 6–20)
CALCIUM SERPL-MCNC: 8.2 MG/DL (ref 8.7–10.5)
CHLORIDE SERPL-SCNC: 111 MMOL/L (ref 95–110)
CLARITY UR REFRACT.AUTO: ABNORMAL
CO2 SERPL-SCNC: 17 MMOL/L (ref 23–29)
COLOR UR AUTO: YELLOW
CREAT SERPL-MCNC: 1.8 MG/DL (ref 0.5–1.4)
CREAT UR-MCNC: 55 MG/DL (ref 15–325)
CREAT UR-MCNC: 55 MG/DL (ref 15–325)
DIFFERENTIAL METHOD: ABNORMAL
EOSINOPHIL # BLD AUTO: 0.5 K/UL (ref 0–0.5)
EOSINOPHIL NFR BLD: 3 % (ref 0–8)
ERYTHROCYTE [DISTWIDTH] IN BLOOD BY AUTOMATED COUNT: 18.4 % (ref 11.5–14.5)
EST. GFR  (AFRICAN AMERICAN): 35.5 ML/MIN/1.73 M^2
EST. GFR  (NON AFRICAN AMERICAN): 30.8 ML/MIN/1.73 M^2
GLUCOSE SERPL-MCNC: 152 MG/DL (ref 70–110)
GLUCOSE UR QL STRIP: NEGATIVE
GRAM STN SPEC: NORMAL
GRAM STN SPEC: NORMAL
HCT VFR BLD AUTO: 23.7 % (ref 37–48.5)
HGB BLD-MCNC: 6.9 G/DL (ref 12–16)
HGB UR QL STRIP: ABNORMAL
HYALINE CASTS UR QL AUTO: 0 /LPF
IMM GRANULOCYTES # BLD AUTO: 0.12 K/UL (ref 0–0.04)
IMM GRANULOCYTES NFR BLD AUTO: 0.8 % (ref 0–0.5)
KETONES UR QL STRIP: NEGATIVE
LEUKOCYTE ESTERASE UR QL STRIP: ABNORMAL
LYMPHOCYTES # BLD AUTO: 1.1 K/UL (ref 1–4.8)
LYMPHOCYTES NFR BLD: 7 % (ref 18–48)
MAGNESIUM SERPL-MCNC: 2.4 MG/DL (ref 1.6–2.6)
MCH RBC QN AUTO: 26.3 PG (ref 27–31)
MCHC RBC AUTO-ENTMCNC: 29.1 G/DL (ref 32–36)
MCV RBC AUTO: 91 FL (ref 82–98)
MICROSCOPIC COMMENT: ABNORMAL
MONOCYTES # BLD AUTO: 1.9 K/UL (ref 0.3–1)
MONOCYTES NFR BLD: 12.8 % (ref 4–15)
NEUTROPHILS # BLD AUTO: 11.5 K/UL (ref 1.8–7.7)
NEUTROPHILS NFR BLD: 76.2 % (ref 38–73)
NITRITE UR QL STRIP: NEGATIVE
NRBC BLD-RTO: 1 /100 WBC
OSMOLALITY UR: 313 MOSM/KG (ref 50–1200)
PH UR STRIP: 6 [PH] (ref 5–8)
PHOSPHATE SERPL-MCNC: 4.2 MG/DL (ref 2.7–4.5)
PLATELET # BLD AUTO: 576 K/UL (ref 150–350)
PMV BLD AUTO: 10.5 FL (ref 9.2–12.9)
POCT GLUCOSE: 106 MG/DL (ref 70–110)
POCT GLUCOSE: 131 MG/DL (ref 70–110)
POCT GLUCOSE: 159 MG/DL (ref 70–110)
POCT GLUCOSE: 174 MG/DL (ref 70–110)
POCT GLUCOSE: 217 MG/DL (ref 70–110)
POTASSIUM SERPL-SCNC: 4.9 MMOL/L (ref 3.5–5.1)
PROT SERPL-MCNC: 6.7 G/DL (ref 6–8.4)
PROT UR QL STRIP: ABNORMAL
PROT UR-MCNC: 136 MG/DL (ref 0–15)
PROT/CREAT UR: 2.47 MG/G{CREAT} (ref 0–0.2)
RBC # BLD AUTO: 2.62 M/UL (ref 4–5.4)
RBC #/AREA URNS AUTO: 55 /HPF (ref 0–4)
SODIUM SERPL-SCNC: 135 MMOL/L (ref 136–145)
SODIUM UR-SCNC: 32 MMOL/L (ref 20–250)
SP GR UR STRIP: 1.02 (ref 1–1.03)
URN SPEC COLLECT METH UR: ABNORMAL
WBC # BLD AUTO: 15.07 K/UL (ref 3.9–12.7)
WBC #/AREA URNS AUTO: 99 /HPF (ref 0–5)
WBC CLUMPS UR QL AUTO: ABNORMAL

## 2020-09-28 PROCEDURE — 87116 MYCOBACTERIA CULTURE: CPT

## 2020-09-28 PROCEDURE — 87102 FUNGUS ISOLATION CULTURE: CPT

## 2020-09-28 PROCEDURE — 36415 COLL VENOUS BLD VENIPUNCTURE: CPT

## 2020-09-28 PROCEDURE — 84100 ASSAY OF PHOSPHORUS: CPT

## 2020-09-28 PROCEDURE — 25500020 PHARM REV CODE 255: Performed by: UROLOGY

## 2020-09-28 PROCEDURE — 99233 PR SUBSEQUENT HOSPITAL CARE,LEVL III: ICD-10-PCS | Mod: ,,, | Performed by: INTERNAL MEDICINE

## 2020-09-28 PROCEDURE — C9113 INJ PANTOPRAZOLE SODIUM, VIA: HCPCS | Performed by: STUDENT IN AN ORGANIZED HEALTH CARE EDUCATION/TRAINING PROGRAM

## 2020-09-28 PROCEDURE — 50431 PR INJECTION PX NEPHROSTOGRAM &/ URETEROGRAM, EXISTING ACCESS, INCL GUID, S&I: ICD-10-PCS | Mod: 58,50,, | Performed by: UROLOGY

## 2020-09-28 PROCEDURE — 80053 COMPREHEN METABOLIC PANEL: CPT

## 2020-09-28 PROCEDURE — 25500020 PHARM REV CODE 255: Performed by: COLON & RECTAL SURGERY

## 2020-09-28 PROCEDURE — 37000009 HC ANESTHESIA EA ADD 15 MINS: Performed by: UROLOGY

## 2020-09-28 PROCEDURE — 63600175 PHARM REV CODE 636 W HCPCS: Performed by: STUDENT IN AN ORGANIZED HEALTH CARE EDUCATION/TRAINING PROGRAM

## 2020-09-28 PROCEDURE — 36000707: Performed by: UROLOGY

## 2020-09-28 PROCEDURE — 50431 NJX PX NFROSGRM &/URTRGRM: CPT | Mod: 58,50,, | Performed by: UROLOGY

## 2020-09-28 PROCEDURE — 87070 CULTURE OTHR SPECIMN AEROBIC: CPT

## 2020-09-28 PROCEDURE — 87015 SPECIMEN INFECT AGNT CONCNTJ: CPT

## 2020-09-28 PROCEDURE — 74420 PR  X-RAY RETROGRADE PYELOGRAM: ICD-10-PCS | Mod: 26,,, | Performed by: UROLOGY

## 2020-09-28 PROCEDURE — 87106 FUNGI IDENTIFICATION YEAST: CPT | Mod: 59

## 2020-09-28 PROCEDURE — 11000001 HC ACUTE MED/SURG PRIVATE ROOM

## 2020-09-28 PROCEDURE — 85025 COMPLETE CBC W/AUTO DIFF WBC: CPT

## 2020-09-28 PROCEDURE — 83935 ASSAY OF URINE OSMOLALITY: CPT

## 2020-09-28 PROCEDURE — 81001 URINALYSIS AUTO W/SCOPE: CPT

## 2020-09-28 PROCEDURE — 25000003 PHARM REV CODE 250: Performed by: STUDENT IN AN ORGANIZED HEALTH CARE EDUCATION/TRAINING PROGRAM

## 2020-09-28 PROCEDURE — 83735 ASSAY OF MAGNESIUM: CPT

## 2020-09-28 PROCEDURE — 25000003 PHARM REV CODE 250: Performed by: RADIOLOGY

## 2020-09-28 PROCEDURE — 36000706: Performed by: UROLOGY

## 2020-09-28 PROCEDURE — 84300 ASSAY OF URINE SODIUM: CPT

## 2020-09-28 PROCEDURE — A4216 STERILE WATER/SALINE, 10 ML: HCPCS | Performed by: STUDENT IN AN ORGANIZED HEALTH CARE EDUCATION/TRAINING PROGRAM

## 2020-09-28 PROCEDURE — 82570 ASSAY OF URINE CREATININE: CPT

## 2020-09-28 PROCEDURE — 63600175 PHARM REV CODE 636 W HCPCS: Performed by: RADIOLOGY

## 2020-09-28 PROCEDURE — 87075 CULTR BACTERIA EXCEPT BLOOD: CPT

## 2020-09-28 PROCEDURE — 99233 SBSQ HOSP IP/OBS HIGH 50: CPT | Mod: ,,, | Performed by: INTERNAL MEDICINE

## 2020-09-28 PROCEDURE — A4217 STERILE WATER/SALINE, 500 ML: HCPCS | Performed by: STUDENT IN AN ORGANIZED HEALTH CARE EDUCATION/TRAINING PROGRAM

## 2020-09-28 PROCEDURE — 87205 SMEAR GRAM STAIN: CPT

## 2020-09-28 PROCEDURE — 37000008 HC ANESTHESIA 1ST 15 MINUTES: Performed by: UROLOGY

## 2020-09-28 PROCEDURE — 25000003 PHARM REV CODE 250: Performed by: NURSE PRACTITIONER

## 2020-09-28 PROCEDURE — 74420 UROGRAPHY RTRGR +-KUB: CPT | Mod: 26,,, | Performed by: UROLOGY

## 2020-09-28 PROCEDURE — 87206 SMEAR FLUORESCENT/ACID STAI: CPT

## 2020-09-28 RX ORDER — ENOXAPARIN SODIUM 100 MG/ML
40 INJECTION SUBCUTANEOUS EVERY 24 HOURS
Status: DISCONTINUED | OUTPATIENT
Start: 2020-09-28 | End: 2020-09-29

## 2020-09-28 RX ORDER — CEFAZOLIN SODIUM 1 G/3ML
INJECTION, POWDER, FOR SOLUTION INTRAMUSCULAR; INTRAVENOUS CODE/TRAUMA/SEDATION MEDICATION
Status: COMPLETED | OUTPATIENT
Start: 2020-09-28 | End: 2020-09-28

## 2020-09-28 RX ORDER — LIDOCAINE HYDROCHLORIDE 5 MG/ML
INJECTION, SOLUTION INFILTRATION; PERINEURAL CODE/TRAUMA/SEDATION MEDICATION
Status: COMPLETED | OUTPATIENT
Start: 2020-09-28 | End: 2020-09-28

## 2020-09-28 RX ORDER — MIDAZOLAM HYDROCHLORIDE 1 MG/ML
INJECTION INTRAMUSCULAR; INTRAVENOUS CODE/TRAUMA/SEDATION MEDICATION
Status: COMPLETED | OUTPATIENT
Start: 2020-09-28 | End: 2020-09-28

## 2020-09-28 RX ORDER — FENTANYL CITRATE 50 UG/ML
INJECTION, SOLUTION INTRAMUSCULAR; INTRAVENOUS CODE/TRAUMA/SEDATION MEDICATION
Status: COMPLETED | OUTPATIENT
Start: 2020-09-28 | End: 2020-09-28

## 2020-09-28 RX ORDER — SODIUM CHLORIDE 9 MG/ML
INJECTION, SOLUTION INTRAVENOUS
Status: COMPLETED | OUTPATIENT
Start: 2020-09-28 | End: 2020-09-28

## 2020-09-28 RX ADMIN — SODIUM CHLORIDE 500 ML: 0.9 INJECTION, SOLUTION INTRAVENOUS at 12:09

## 2020-09-28 RX ADMIN — Medication 10 ML: at 06:09

## 2020-09-28 RX ADMIN — INSULIN ASPART 1 UNITS: 100 INJECTION, SOLUTION INTRAVENOUS; SUBCUTANEOUS at 04:09

## 2020-09-28 RX ADMIN — METRONIDAZOLE 500 MG: 500 TABLET ORAL at 09:09

## 2020-09-28 RX ADMIN — LOPERAMIDE HYDROCHLORIDE 2 MG: 2 CAPSULE ORAL at 08:09

## 2020-09-28 RX ADMIN — FENTANYL CITRATE 50 MCG: 50 INJECTION INTRAMUSCULAR; INTRAVENOUS at 12:09

## 2020-09-28 RX ADMIN — IOHEXOL 20 ML: 300 INJECTION, SOLUTION INTRAVENOUS at 12:09

## 2020-09-28 RX ADMIN — LOPERAMIDE HYDROCHLORIDE 2 MG: 2 CAPSULE ORAL at 09:09

## 2020-09-28 RX ADMIN — LIDOCAINE HYDROCHLORIDE 5 ML: 5 INJECTION, SOLUTION INFILTRATION; PERINEURAL at 12:09

## 2020-09-28 RX ADMIN — METRONIDAZOLE 500 MG: 500 TABLET ORAL at 05:09

## 2020-09-28 RX ADMIN — MIDAZOLAM HYDROCHLORIDE 1 MG: 1 INJECTION, SOLUTION INTRAMUSCULAR; INTRAVENOUS at 12:09

## 2020-09-28 RX ADMIN — METOPROLOL TARTRATE 25 MG: 25 TABLET, FILM COATED ORAL at 08:09

## 2020-09-28 RX ADMIN — CEFEPIME 1 G: 1 INJECTION, POWDER, FOR SOLUTION INTRAMUSCULAR; INTRAVENOUS at 04:09

## 2020-09-28 RX ADMIN — CEFEPIME 1 G: 1 INJECTION, POWDER, FOR SOLUTION INTRAMUSCULAR; INTRAVENOUS at 12:09

## 2020-09-28 RX ADMIN — PANTOPRAZOLE SODIUM 40 MG: 40 INJECTION, POWDER, LYOPHILIZED, FOR SOLUTION INTRAVENOUS at 08:09

## 2020-09-28 RX ADMIN — METOPROLOL TARTRATE 25 MG: 25 TABLET, FILM COATED ORAL at 09:09

## 2020-09-28 RX ADMIN — Medication 10 ML: at 12:09

## 2020-09-28 RX ADMIN — METRONIDAZOLE 500 MG: 500 TABLET ORAL at 01:09

## 2020-09-28 RX ADMIN — INSULIN ASPART 1 UNITS: 100 INJECTION, SOLUTION INTRAVENOUS; SUBCUTANEOUS at 12:09

## 2020-09-28 RX ADMIN — CEFEPIME 1 G: 1 INJECTION, POWDER, FOR SOLUTION INTRAMUSCULAR; INTRAVENOUS at 08:09

## 2020-09-28 RX ADMIN — ENOXAPARIN SODIUM 40 MG: 40 INJECTION SUBCUTANEOUS at 04:09

## 2020-09-28 RX ADMIN — MAGNESIUM SULFATE HEPTAHYDRATE: 500 INJECTION, SOLUTION INTRAMUSCULAR; INTRAVENOUS at 12:09

## 2020-09-28 RX ADMIN — Medication 10 ML: at 01:09

## 2020-09-28 RX ADMIN — INSULIN ASPART 4 UNITS: 100 INJECTION, SOLUTION INTRAVENOUS; SUBCUTANEOUS at 05:09

## 2020-09-28 RX ADMIN — MICAFUNGIN SODIUM 100 MG: 20 INJECTION, POWDER, LYOPHILIZED, FOR SOLUTION INTRAVENOUS at 01:09

## 2020-09-28 RX ADMIN — CEFAZOLIN 1 G: 330 INJECTION, POWDER, FOR SOLUTION INTRAMUSCULAR; INTRAVENOUS at 11:09

## 2020-09-28 NOTE — PROGRESS NOTES
[facility]  Colorectal Surgery  Progress Note    Patient Name: Edita Riley  MRN: [id number]  Admission Date: 9/11/2020  Hospital Length of Stay: 17 days  Attending Physician: Hammad Reynolds MD    Subjective:     Interval History: no acute events overnite, AF, abd pain controlled with oral meds, for nephrostrogam today per urology    Post-Op Info:  Procedure(s) (LRB):  Nephrostogram - antegrade (Bilateral)  REMOVAL, STENT, URETER (Bilateral)   Day of Surgery      Medications:  Continuous Infusions:   sodium chloride 0.9% 40 mL/hr at 09/25/20 1113     Scheduled Meds:   ceFEPime (MAXIPIME) IVPB  1 g Intravenous Q8H    cyanocobalamin  100 mcg Intramuscular Weekly    enoxaparin  40 mg Subcutaneous Q24H    epoetin yecenia-epbx  20,000 Units Subcutaneous Q48H    loperamide  2 mg Oral BID    metoprolol tartrate  25 mg Oral BID    metroNIDAZOLE  500 mg Oral Q8H    micafungin (MYCAMINE) IVPB  100 mg Intravenous Q24H    pantoprazole  40 mg Intravenous Daily    sodium chloride 0.9%  10 mL Intravenous Q6H     PRN Meds:   acetaminophen    dextrose 50%    glucagon (human recombinant)    HYDROmorphone    insulin aspart U-100    iohexol    iohexol    labetalol    ondansetron    oxyCODONE-acetaminophen    oxyCODONE-acetaminophen    sodium chloride 0.9%    sodium chloride 0.9%    sodium chloride 0.9%        Objective:     Vital Signs (Most Recent):  Temp: 98.6 °F (37 °C) (09/28/20 0808)  Pulse: (!) 113 (09/28/20 1210)  Resp: 19 (09/28/20 1210)  BP: 128/64 (09/28/20 1210)  SpO2: 100 % (09/28/20 1210) Vital Signs (24h Range):  Temp:  [98 °F (36.7 °C)-100 °F (37.8 °C)] 98.6 °F (37 °C)  Pulse:  [] 113  Resp:  [18-23] 19  SpO2:  [94 %-100 %] 100 %  BP: (113-143)/(61-84) 128/64     Intake/Output - Last 3 Shifts       09/26 0700 - 09/27 0659 09/27 0700 - 09/28 0659 09/28 0700 - 09/29 0659    P.O.  150     I.V. (mL/kg)       Other 0      IV Piggyback       TPN       Total  Intake(mL/kg) 0 (0) 150 (1.7)     Urine (mL/kg/hr) 1380 (0.6) 2120 (1)     Drains 30 0     Other 120 0 50    Stool 700 2050 100    Total Output 2230 4170 150    Net -2230 -4020 -150           Urine Occurrence  2 x     Stool Occurrence  0 x           Physical Exam  Constitutional:       Appearance: She is well-developed. She is not ill-appearing.   HENT:      Head: Normocephalic.   Eyes:      Pupils: Pupils are equal, round, and reactive to light.   Cardiovascular:      Rate and Rhythm: Normal rate and regular rhythm.      Heart sounds: Normal heart sounds.   Pulmonary:      Effort: Pulmonary effort is normal. No respiratory distress.      Breath sounds: Normal breath sounds. No wheezing or rales.   Abdominal:      Palpations: Abdomen is soft. There is no mass.      Tenderness: There is no guarding or rebound.      Comments: Wound vac in place  Ileostomy functional  Urostomy functional   Skin:     General: Skin is warm and dry.   Neurological:      Mental Status: She is alert and oriented to person, place, and time.   Psychiatric:         Behavior: Behavior normal.         Thought Content: Thought content normal.         Judgment: Judgment normal.           Significant Labs:  BMP (Last 3 Results):   Recent Labs   Lab 09/26/20  0520 09/27/20  0444 09/28/20  0448   * 133* 152*   * 136 135*   K 4.3 4.8 4.9    108 111*   CO2 18* 16* 17*   BUN 22* 25* 23*   CREATININE 1.7* 2.0* 1.8*   CALCIUM 7.9* 8.2* 8.2*   MG 2.2 2.5 2.4     CBC (Last 3 Results):   Recent Labs   Lab 09/26/20  0520 09/27/20  0444 09/28/20  0448   WBC 18.71* 15.95* 15.07*   RBC 2.37* 2.65* 2.62*   HGB 6.4* 7.1* 6.9*   HCT 21.5* 24.0* 23.7*   * 459* 576*   MCV 91 91 91   MCH 27.0 26.8* 26.3*   MCHC 29.8* 29.6* 29.1*       Significant Diagnostics:  None    Assessment/Plan:     * Bilateral ureteral obstruction  Ms. Darensbourg is a 55 y.o. female with b/l ureteral obstruction s/p transverse colon conduit on 9/11/2020. Extended R  colectomy and ileostomy creation on 9/17.     - Diet regular  - d/c tpn  - ITA changed per ID  -  For nephrosotrogram today   - OOB, activity as tolerated  - PT/OT        Fungemia  Blood cult poss for yeast    -consult opth  -d/c PICC  -ITA per ID, cefepine, flgayl and microfungin  -monitor culture results   -line holiday    Wound infection after surgery  Wound now open, local wound care, poss wound vac  ITA per ID    Hypokalemia  Patient has hypokalemia which is currently uncontrolled. Last electrolytes reviewed-   Recent Labs   Lab 09/27/20  0444 09/28/20  0448   K 4.8 4.9   . Will replace potassium and monitor electrolytes closely.     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  At time of that procedure, I procured a vascularized segment of transverse colon to be used as the urinary conduit, as requested by Urology.  A primary end-to-end, hand-sewn colocolonic anastomosis was performed at that time to restore intestinal continuity once the segment of colon to be used for the conduit had been excluded.  The patient did well for the 1st few days postoperatively but then began to develop abdominal pain and distention consistent with an ileus.  This morning, there was small amount of pneumoperitoneum seen on a plain abdominal film, and later today she began to drain feculent appearing fluid through her Kurtis-Arias drain.  She had a normal white blood cell count and did not have peritonitis on examination, so a CT scan was performed, which demonstrated a large volume of free abdominal fluid as well as a large volume of pneumoperitoneum, concerning for an anastomotic leak.  She is being brought back to the operating room for  urgent exploratory laparotomy.    OR 9/17/2020    Consult wound care for ileosotmy  Naseem vaughan, scd  Enc amb and IS  casandra smal amt of diet      Moderate malnutrition  Consult dietary  TPN dced  Monitor labs  Appreciate dietary inp;ut    Severe episode of recurrent major depressive disorder, with psychotic features  Cont home m eds    Impaired functional mobility, balance, gait, and endurance  PT/OT    Essential hypertension  Chronic, controlled.  Latest blood pressure and vitals reviewed-   Temp:  [98 °F (36.7 °C)-100 °F (37.8 °C)]   Pulse:  []   Resp:  [18-23]   BP: (113-143)/(61-84)   SpO2:  [94 %-100 %] .   Home meds for hypertension were reviewed and noted below. Hospital anti-hypertensive changes were made as shown below.  Hospital Medications             labetalol 20 mg/4 mL (5 mg/mL) IV syring 10 mg, Intravenous, Every 4 hours PRN, 1. Confirm patient on continuous cardiac monitor (obtain order if needed)<BR>2. Obstetrics is exempt from continuous cardiac monitoring. <BR>3. Monitor ECG rhythm throughout administrations noting rhythm and changes<BR>4. Monitor BP and HR pre-administration, post-administration, and every 30 minutes x1 after dose given<BR>5. Administer Labetalol at rate no faster than 10mg over 1 minute.<BR>6. Contraindications: HR &lt;50, SBP&lt;100, second or third degree AV block. If assessed, hold dose and call provider.        Will utilize p.r.n. blood pressure medication only if patient's blood pressure greater than  180/110 and she develops symptoms such as worsening chest pain or shortness of breath.          Kerri Castillo NP  Colorectal Surgery  [facility]

## 2020-09-28 NOTE — SUBJECTIVE & OBJECTIVE
Interval History:S/p antegrade nephrostograms in cysto 09/28, plan for IR  bilateral neph tubes exchange today.    Review of Systems   Constitutional: Positive for activity change. Negative for appetite change, chills and fever.   HENT: Negative for congestion.    Eyes: Negative for pain and itching.   Respiratory: Negative for cough, chest tightness and shortness of breath.    Cardiovascular: Negative for palpitations and leg swelling.   Gastrointestinal: Negative for abdominal pain and nausea.   Endocrine: Negative for polyphagia and polyuria.   Genitourinary: Negative for dysuria and frequency.   Musculoskeletal: Negative for back pain.   Neurological: Negative for numbness and headaches.   Psychiatric/Behavioral: Negative for agitation and behavioral problems.     Objective:     Vital Signs (Most Recent):  Temp: 98.6 °F (37 °C) (09/28/20 0808)  Pulse: (!) 114 (09/28/20 1205)  Resp: 20 (09/28/20 1205)  BP: (!) 142/70 (09/28/20 1205)  SpO2: 100 % (09/28/20 1205) Vital Signs (24h Range):  Temp:  [98 °F (36.7 °C)-100 °F (37.8 °C)] 98.6 °F (37 °C)  Pulse:  [] 114  Resp:  [18-23] 20  SpO2:  [94 %-100 %] 100 %  BP: (113-143)/(61-84) 142/70     Weight: 88.9 kg (196 lb)  Body mass index is 28.94 kg/m².    Estimated Creatinine Clearance: 41 mL/min (A) (based on SCr of 1.8 mg/dL (H)).    Physical Exam  Constitutional:       Appearance: She is well-developed.   HENT:      Head: Normocephalic.   Cardiovascular:      Rate and Rhythm: Normal rate and regular rhythm.      Heart sounds: Normal heart sounds. No murmur.   Pulmonary:      Effort: Pulmonary effort is normal.      Breath sounds: Normal breath sounds.   Abdominal:      General: Bowel sounds are normal. There is no distension.      Palpations: Abdomen is soft.      Tenderness: There is no abdominal tenderness.      Comments: Urostomy + Ileostomy   IR drain RLQ draining light yellow clear fluid  Midline incision with staples with wound vac in      Musculoskeletal:  Normal range of motion.   Neurological:      Mental Status: She is alert and oriented to person, place, and time.   Psychiatric:         Behavior: Behavior normal.         Significant Labs: All pertinent labs within the past 24 hours have been reviewed.    Significant Imaging: I have reviewed all pertinent imaging results/findings within the past 24 hours.

## 2020-09-28 NOTE — SUBJECTIVE & OBJECTIVE
Past Medical History:   Diagnosis Date    Abnormal mammogram 8/25/2020    Anxiety     Cervical cancer 2014    Chronic back pain     Depression     Diarrhea due to malabsorption 11/14/2018    Fibromyalgia     Generalized abdominal pain 8/25/2020    GERD (gastroesophageal reflux disease)     Hiatal hernia 2014    History of cervical cancer 10/11/2018    History of cervical cancer 10/11/2018    Hx of psychiatric care     Cymbalta, trazodone    Hypertension     Hypomagnesemia 11/21/2018    Lactose intolerance     Metastatic squamous cell carcinoma to lymph node 10/11/2018    Nephrostomy tube displaced     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 drug 11/14/2018    Osteoarthritis of back     Psychiatric problem     Stroke 1972       Past Surgical History:   Procedure Laterality Date    BILATERAL OOPHORECTOMY  2015    CHOLECYSTECTOMY  11/09/2016    Done at [facility], path showed chronic cholecystitis and gallstones    cold knife conization  2014    COLECTOMY, RIGHT  9/17/2020    Procedure: COLECTOMY, RIGHT Extended;  Surgeon: Hammad Reynolds MD;  [facility];  Service: Colon and Rectal;;    COLONOSCOPY  2014    COLONOSCOPY N/A 10/24/2016    at [facility], Dr Gutiérrez    COLONOSCOPY N/A 5/18/2018    Procedure: COLONOSCOPY;  Surgeon: Arden Gutiérrez MD;  Location: [facility] (4TH FLR);  Service: Endoscopy;  Laterality: N/A;    COLONOSCOPY N/A 7/28/2020    Procedure: COLONOSCOPY;  Surgeon: Hammad Reynolds MD;  Location: [facility] (4TH FLR);  Service: Colon and Rectal;  Laterality: N/A;  covid test [place] 7/25    CYSTOSCOPY WITH URETEROSCOPY, RETROGRADE PYELOGRAPHY, AND INSERTION OF STENT Bilateral 3/21/2020    Procedure: CYSTOSCOPY, WITH RETROGRADE PYELOGRAM,;  Surgeon: Leslie Balbuena MD;  Location: [facility] OR [facility] FLR;  Service: Urology;  Laterality: Bilateral;    ESOPHAGOGASTRODUODENOSCOPY  2014    HYSTERECTOMY  2014    [facility] for cervical cancer    ILEOSTOMY  9/17/2020    Procedure:  CREATION, ILEOSTOMY;  Surgeon: Hammad Reynolds MD;  Location: [facility] OR 2ND FLR;  Service: Colon and Rectal;;    MOBILIZATION OF SPLENIC FLEXURE N/A 9/11/2020    Procedure: MOBILIZATION, COLONIC;  Surgeon: Hammad Reynolds MD;  Location: [facility] OR 2ND FLR;  Service: Colon and Rectal;  Laterality: N/A;    OOPHORECTOMY      RETROPERITONEAL LYMPHADENECTOMY Right 9/17/2018    Procedure: LYMPHADENECTOMY, RETROPERITONEUM;  Surgeon: Sebas Reed MD;  Location: [facility] OR 2ND FLR;  Service: General;  Laterality: Right;  ILIAC       Review of patient's allergies indicates:   Allergen Reactions    Bee sting [allergen ext-venom-honey bee]      Rash      Grass pollen-bermuda, standard      rash     Current Facility-Administered Medications   Medication Frequency    0.9%  NaCl infusion Continuous    acetaminophen tablet 650 mg Q6H PRN    ceFEPIme injection 1 g Q8H    cyanocobalamin injection 100 mcg Weekly    dextrose 50% injection 12.5 g PRN    enoxaparin injection 40 mg Q24H    epoetin yecenia-epbx injection 20,000 Units Q48H    glucagon (human recombinant) injection 1 mg PRN    HYDROmorphone injection 0.5 mg Q6H PRN    insulin aspart U-100 pen 1-10 Units Q4H PRN    iohexol (OMNIPAQUE) oral solution 15 mL PRN    iohexol (OMNIPAQUE) oral solution 15 mL PRN    labetalol 20 mg/4 mL (5 mg/mL) IV syring Q4H PRN    loperamide capsule 2 mg BID    metoprolol tartrate (LOPRESSOR) tablet 25 mg BID    metroNIDAZOLE tablet 500 mg Q8H    micafungin 100 mg in sodium chloride 0.9 % 100 mL IVPB (ready to mix system) Q24H    ondansetron injection 4 mg Q6H PRN    oxyCODONE-acetaminophen  mg per tablet 1 tablet Q4H PRN    oxyCODONE-acetaminophen 5-325 mg per tablet 1 tablet Q4H PRN    pantoprazole injection 40 mg Daily    sodium chloride 0.9% flush 10 mL PRN    sodium chloride 0.9% flush 10 mL Q6H    And    sodium chloride 0.9% flush 10 mL PRN    sodium chloride 0.9% flush 3 mL PRN     Family History     Problem  Relation (Age of Onset)    Breast cancer Maternal Aunt    Cancer Father, Mother    Cervical cancer Mother    Colon cancer Father    Drug abuse Daughter, Daughter    Esophageal cancer Father    Heart disease Sister, Maternal Grandmother    Suicide Daughter        Tobacco Use    Smoking status: Former Smoker    Smokeless tobacco: Never Used   Substance and Sexual Activity    Alcohol use: No     Alcohol/week: 0.0 standard drinks    Drug use: No    Sexual activity: Yes     Partners: Male     Birth control/protection: None     Comment:  19 years since 1999     Review of Systems   Constitutional: Negative for chills and fever.   Respiratory: Negative for chest tightness and shortness of breath.    Cardiovascular: Negative for chest pain and leg swelling.   Gastrointestinal: Negative for nausea and vomiting.     Objective:     Vital Signs (Most Recent):  Temp: 98.7 °F (37.1 °C) (09/28/20 1215)  Pulse: 108 (09/28/20 1230)  Resp: 18 (09/28/20 1230)  BP: 115/64 (09/28/20 1230)  SpO2: 100 % (09/28/20 1230)  O2 Device (Oxygen Therapy): room air (09/28/20 1143) Vital Signs (24h Range):  Temp:  [98 °F (36.7 °C)-100 °F (37.8 °C)] 98.7 °F (37.1 °C)  Pulse:  [] 108  Resp:  [18-23] 18  SpO2:  [94 %-100 %] 100 %  BP: (113-143)/(61-84) 115/64     Weight: 88.9 kg (196 lb) (09/16/20 2300)  Body mass index is 28.94 kg/m².  Body surface area is 2.08 meters squared.    I/O last 3 completed shifts:  In: 150 [P.O.:150]  Out: 5770 [Urine:3220; Other:150; Stool:2400]    Physical Exam  Constitutional:       General: She is not in acute distress.  HENT:      Head: Normocephalic and atraumatic.      Mouth/Throat:      Mouth: Mucous membranes are moist.      Pharynx: No oropharyngeal exudate.   Eyes:      General:         Right eye: No discharge.         Left eye: No discharge.      Extraocular Movements: Extraocular movements intact.   Cardiovascular:      Rate and Rhythm: Normal rate and regular rhythm.      Heart sounds: No  murmur.   Pulmonary:      Effort: Pulmonary effort is normal. No respiratory distress.      Breath sounds: Normal breath sounds.   Abdominal:      General: There is no distension.      Tenderness: There is no guarding.   Skin:     General: Skin is warm and dry.   Neurological:      Mental Status: She is alert and oriented to person, place, and time.   Psychiatric:         Mood and Affect: Mood normal.         Behavior: Behavior normal.         Significant Labs:  CBC:   Recent Labs   Lab 09/28/20  0448   WBC 15.07*   RBC 2.62*   HGB 6.9*   HCT 23.7*   *   MCV 91   MCH 26.3*   MCHC 29.1*     CMP:   Recent Labs   Lab 09/28/20  0448   *   CALCIUM 8.2*   ALBUMIN 1.7*   PROT 6.7   *   K 4.9   CO2 17*   *   BUN 23*   CREATININE 1.8*   ALKPHOS 101   ALT 10   AST 12   BILITOT 0.3     All labs within the past 24 hours have been reviewed.    Significant Imaging:    Retroperitoneal US 9/27:  Impression:     Mild increased cortical echogenicity bilaterally and elevated resistive indices, which can be seen in the setting of medical renal disease.     There are ureteral stents and nephrostomy tubes in place bilaterally.  No evidence of hydronephrosis.

## 2020-09-28 NOTE — ASSESSMENT & PLAN NOTE
Chronic, controlled.  Latest blood pressure and vitals reviewed-   Temp:  [98 °F (36.7 °C)-100 °F (37.8 °C)]   Pulse:  []   Resp:  [18-23]   BP: (113-143)/(61-84)   SpO2:  [94 %-100 %] .   Home meds for hypertension were reviewed and noted below. Hospital anti-hypertensive changes were made as shown below.  Hospital Medications             labetalol 20 mg/4 mL (5 mg/mL) IV syring 10 mg, Intravenous, Every 4 hours PRN, 1. Confirm patient on continuous cardiac monitor (obtain order if needed)<BR>2. Obstetrics is exempt from continuous cardiac monitoring. <BR>3. Monitor ECG rhythm throughout administrations noting rhythm and changes<BR>4. Monitor BP and HR pre-administration, post-administration, and every 30 minutes x1 after dose given<BR>5. Administer Labetalol at rate no faster than 10mg over 1 minute.<BR>6. Contraindications: HR &lt;50, SBP&lt;100, second or third degree AV block. If assessed, hold dose and call provider.        Will utilize p.r.n. blood pressure medication only if patient's blood pressure greater than  180/110 and she develops symptoms such as worsening chest pain or shortness of breath.

## 2020-09-28 NOTE — PROGRESS NOTES
[facility]  Infectious Disease  Progress Note    Patient Name: Edita Riley  MRN: [id number]  Admission Date: 9/11/2020  Length of Stay: 17 days  Attending Physician: Hammad Reynolds MD  Primary Care Provider: Audrey Mustafa MD    Isolation Status: No active isolations  Assessment/Plan:      Fungemia  Edita Riley is a 55 y.o. female with a history of cervical cancer s/p pelvic radiation, admitted since 09/11 s/p open transverse colon conduit urinary diversion on 9/11 c/b feculent drainage via abdominal drain on 9/17 CT scan showed large volume pneumoperitoneum and free fluid concerning for colocolonic anastomotic leak. Pt taken back to OR on 9/17.  Focal area of perforation 5cm distal to colo-colic anastomosis was found.  Right colon, remaining transverse, and proximal descending colon resected,End-ileostomy matured, Long sánchez's pouch left. 09/22 CT A/P repeated due to raising wbc which showed a moderate volume of fluid and air within the abdomen, IR placed placed RLQ drain 09/23 started spiking fever 09/24- she has been on Zosyn since 09/17 and fluconazole added 09/26 for bld clx growing yeast 09/25.    Micro:  Abdominal fluid 09/17: Enterobacter cloacae + E.hormaechei + Bacteroides + solobacterium + Klebsiella oxytoca.    Bcx 09/25: 1/4 yeast    Abx:  Zosyn 09/17  Fluconazole x1 09/27    S/p antegrade nephrostograms in cysto + bilateral stents removed 09/28,  plan for IR  bilateral neph tubes exchange today.    Recommendations:    · Remove PICC line, would hold of placing another line until clx clears.  · Continue Micafungin for broader coverage until speciation of yeats.  · Ophthalmology evaluation for fungal endophthalmitis.  · Echo to r/o vegetation.  · Continue o cefepime and flagyl for intraabdominal infections.  · Repeat Blood clx after PICC line removed.          Thank you for your consult. I will follow-up with patient. Please contact us if you have any  additional questions.    Marie Meneses MD  Infectious Disease  [facility]    Subjective:     Principal Problem:Bilateral ureteral obstruction    HPI: Edita Riley is a 55 y.o. female with a history of cervical cancer s/p pelvic radiation, admitted since 09/11 s/p open transverse colon conduit urinary diversion on 9/11 c/b feculent drainage via abdominal drain on 9/17 CT scan showed large volume pneumoperitoneum and free fluid concerning for colocolonic anastomotic leak. Pt taken back to OR on 9/17.  Focal area of perforation 5cm distal to colo-colic anastomosis was found.  Right colon, remaining transverse, and proximal descending colon resected,End-ileostomy matured, Long sánchez's pouch left. 09/22 CT A/P repeated due to raising wbc which showed a moderate volume of fluid and air within the abdomen, IR placed placed RLQ drain 09/23 started spiking fever 09/24- she has been on Zosyn since 09/17 and fluconazole added 09/26 for bld clx growing yeast 09/25. ID consulted for fungemia.  No new subjective & objective note has been filed under this hospital service since the last note was generated.

## 2020-09-28 NOTE — SUBJECTIVE & OBJECTIVE
Interval History: NAEON. Tmax 100 yesterday.   Tolerating regular diet (although now NPO for antegrade nephrostograms in cysto today), pain controlled, ambulating.  Tolerating left NT clamping trial   Blood culture with yeast. Added micafungin.     Objective:     Temp:  [98 °F (36.7 °C)-100 °F (37.8 °C)] 99 °F (37.2 °C)  Pulse:  [] 95  Resp:  [18-22] 18  SpO2:  [96 %-99 %] 98 %  BP: (113-139)/(69-81) 139/81     Body mass index is 28.94 kg/m².           Drains     Drain                 Nephrostomy 08/13/20 0805 Left 12 Fr. 42 days         Nephrostomy 08/13/20 0809 Right 12 Fr. 42 days         Urostomy 09/11/20 ileal conduit LLQ 14 days         Ileostomy 09/18/20 RLQ 7 days         Nephrostomy 09/18/20 0025 Left 7 days         Nephrostomy 09/18/20 0025 Right 7 days         Closed/Suction Drain 09/23/20 0720 Right Abdomen Bulb 10 Fr. 1 day                Physical Exam   Constitutional: No distress.   HENT:   Head: Normocephalic and atraumatic.   Eyes: Conjunctivae are normal. No scleral icterus.   Neck: Normal range of motion.   Cardiovascular: Normal rate.    Pulmonary/Chest: Effort normal. No respiratory distress.   Abdominal: Soft. She exhibits no distension. There is abdominal tenderness (appropriate). There is no rebound and no guarding.   Urostomy p/p/p draining clear yellow urine  Ureteral stents in place  Ileostomy with light colored liquid output  IR drain LLQ draining light yellow clear fluid  Midline incision with wound vac in place    Musculoskeletal: Normal range of motion.      Comments: SCDs in place   Neurological: She is alert.   Skin: Skin is warm and dry. She is not diaphoretic.         Significant Labs:    BMP:  Recent Labs   Lab 09/26/20 0520 09/27/20  0444 09/28/20  0448   * 136 135*   K 4.3 4.8 4.9    108 111*   CO2 18* 16* 17*   BUN 22* 25* 23*   CREATININE 1.7* 2.0* 1.8*   CALCIUM 7.9* 8.2* 8.2*       CBC:   Recent Labs   Lab 09/26/20  0520 09/27/20  0444 09/28/20  0448   WBC  18.71* 15.95* 15.07*   HGB 6.4* 7.1* 6.9*   HCT 21.5* 24.0* 23.7*   * 459* 576*       All pertinent labs results from the past 24 hours have been reviewed.    Significant Imaging:  All pertinent imaging results/findings from the past 24 hours have been reviewed.        Review of Systems

## 2020-09-28 NOTE — PLAN OF CARE
Pt oriented to room and unit. AAOx4 and VS charted. No falls or injuries on this shift. Drains emptied and charted. Safety precautions maintained. Bed in lowest position with siderails up x2. Call bell within reach; pt instructed to call for assistance.  Pt updated with POC.  Will continue to monitor.

## 2020-09-28 NOTE — CONSULTS
[facility]  Nephrology  Consult Note    Patient Name: Edita Riley  MRN: [id number]  Admission Date: 9/11/2020  Hospital Length of Stay: 17 days  Attending Provider: Hammad Reynolds MD   Primary Care Physician: Audrey Mustafa MD  Principal Problem:Bilateral ureteral obstruction    Inpatient consult to Nephrology  Consult performed by: Kris Tai MD  Consult ordered by: Nolan Hartman MD        Subjective:     HPI: Mrs. Riley is a 55 year old female with a history of cervical cancer s/p radiation resulting in ureteral strictures for which she underwent b/l nephrostomy tube placement. On 9/11 she underwent a creation of a transverse colon urinary conduit to Q. On 9/14 she with placement of bilateral ureteral stents. Her hospital stay was complicated by a post-operative bowel perforation now with a RLQ end ileostomy. IR placed a RLQ drain on 9/23 into an air fluid collection concerning for abscess in the presence of persistent leukocytosis. On 9/28, Urology removed the ureteral stents and clamped both nephrostomy tubes, with the expectation that ureteral peristalsis improvement.     Nephrology was consulted for ODESSA in the presence of no hydronephrosis or obstruction. Baseline Cr is around 0.8-1. Cr was normal up until 9/24. ODESSA happened on 9/25 with a rise in Cr to 1.7.    Past Medical History:   Diagnosis Date    Abnormal mammogram 8/25/2020    Anxiety     Cervical cancer 2014    Chronic back pain     Depression     Diarrhea due to malabsorption 11/14/2018    Fibromyalgia     Generalized abdominal pain 8/25/2020    GERD (gastroesophageal reflux disease)     Hiatal hernia 2014    History of cervical cancer 10/11/2018    History of cervical cancer 10/11/2018    Hx of psychiatric care     Cymbalta, trazodone    Hypertension     Hypomagnesemia 11/21/2018    Lactose intolerance     Metastatic squamous cell carcinoma to lymph node 10/11/2018    Nephrostomy tube  displaced     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 drug 11/14/2018    Osteoarthritis of back     Psychiatric problem     Stroke 1972       Past Surgical History:   Procedure Laterality Date    BILATERAL OOPHORECTOMY  2015    CHOLECYSTECTOMY  11/09/2016    Done at [facility], path showed chronic cholecystitis and gallstones    cold knife conization  2014    COLECTOMY, RIGHT  9/17/2020    Procedure: COLECTOMY, RIGHT Extended;  Surgeon: Hammad Reynolds MD;  Location: [facility] OR 2ND FLR;  Service: Colon and Rectal;;    COLONOSCOPY  2014    [facility]    COLONOSCOPY N/A 5/18/2018    Procedure: COLONOSCOPY;  Surgeon: Arden Gutiérrez MD;  Location: [facility] ENDO (4TH FLR);  Service: Endoscopy;  Laterality: N/A;    COLONOSCOPY N/A 7/28/2020    Procedure: COLONOSCOPY;  Surgeon: Hammad Reynolds MD;  Location: [facility] ENDO (4TH FLR);  Service: Colon and Rectal;  Laterality: N/A;  covid test [place] 7/25    CYSTOSCOPY WITH URETEROSCOPY, RETROGRADE PYELOGRAPHY, AND INSERTION OF STENT Bilateral 3/21/2020    Procedure: CYSTOSCOPY, WITH RETROGRADE PYELOGRAM,;  Surgeon: Leslie Balbuena MD;  Location: [facility] OR 1ST FLR;  Service: Urology;  Laterality: Bilateral;    ESOPHAGOGASTRODUODENOSCOPY  2014    HYSTERECTOMY  2014    TVH for cervical cancer    ILEOSTOMY  9/17/2020    Procedure: CREATION, ILEOSTOMY;  Surgeon: Hammad Reynolds MD;  Location: [facility] OR 2ND FLR;  Service: Colon and Rectal;;    MOBILIZATION OF SPLENIC FLEXURE N/A 9/11/2020    Procedure: MOBILIZATION, COLONIC;  Surgeon: Hammad Reynolds MD;  Location: [facility] OR 2ND FLR;  Service: Colon and Rectal;  Laterality: N/A;    OOPHORECTOMY      RETROPERITONEAL LYMPHADENECTOMY Right 9/17/2018    Procedure: LYMPHADENECTOMY, RETROPERITONEUM;  Surgeon: Sebas Reed MD;  Location: [facility] OR 2ND FLR;  Service: General;  Laterality: Right;  ILIAC       Review of patient's allergies indicates:   Allergen Reactions    Bee sting [allergen  ext-venom-honey bee]      Rash      Grass pollen-bermuda, standard      rash     Current Facility-Administered Medications   Medication Frequency    0.9%  NaCl infusion Continuous    acetaminophen tablet 650 mg Q6H PRN    ceFEPIme injection 1 g Q8H    cyanocobalamin injection 100 mcg Weekly    dextrose 50% injection 12.5 g PRN    enoxaparin injection 40 mg Q24H    epoetin yecenia-epbx injection 20,000 Units Q48H    glucagon (human recombinant) injection 1 mg PRN    HYDROmorphone injection 0.5 mg Q6H PRN    insulin aspart U-100 pen 1-10 Units Q4H PRN    iohexol (OMNIPAQUE) oral solution 15 mL PRN    iohexol (OMNIPAQUE) oral solution 15 mL PRN    labetalol 20 mg/4 mL (5 mg/mL) IV syring Q4H PRN    loperamide capsule 2 mg BID    metoprolol tartrate (LOPRESSOR) tablet 25 mg BID    metroNIDAZOLE tablet 500 mg Q8H    micafungin 100 mg in sodium chloride 0.9 % 100 mL IVPB (ready to mix system) Q24H    ondansetron injection 4 mg Q6H PRN    oxyCODONE-acetaminophen  mg per tablet 1 tablet Q4H PRN    oxyCODONE-acetaminophen 5-325 mg per tablet 1 tablet Q4H PRN    pantoprazole injection 40 mg Daily    sodium chloride 0.9% flush 10 mL PRN    sodium chloride 0.9% flush 10 mL Q6H    And    sodium chloride 0.9% flush 10 mL PRN    sodium chloride 0.9% flush 3 mL PRN     Family History     Problem Relation (Age of Onset)    Breast cancer Maternal Aunt    Cancer Father, Mother    Cervical cancer Mother    Colon cancer Father    Drug abuse Daughter, Daughter    Esophageal cancer Father    Heart disease Sister, Maternal Grandmother    Suicide Daughter        Tobacco Use    Smoking status: Former Smoker    Smokeless tobacco: Never Used   Substance and Sexual Activity    Alcohol use: No     Alcohol/week: 0.0 standard drinks    Drug use: No    Sexual activity: Yes     Partners: Male     Birth control/protection: None     Comment:  19 years since 1999     Review of Systems   Constitutional: Negative  for chills and fever.   Respiratory: Negative for chest tightness and shortness of breath.    Cardiovascular: Negative for chest pain and leg swelling.   Gastrointestinal: Negative for nausea and vomiting.     Objective:     Vital Signs (Most Recent):  Temp: 98.7 °F (37.1 °C) (09/28/20 1215)  Pulse: 108 (09/28/20 1230)  Resp: 18 (09/28/20 1230)  BP: 115/64 (09/28/20 1230)  SpO2: 100 % (09/28/20 1230)  O2 Device (Oxygen Therapy): room air (09/28/20 1143) Vital Signs (24h Range):  Temp:  [98 °F (36.7 °C)-100 °F (37.8 °C)] 98.7 °F (37.1 °C)  Pulse:  [] 108  Resp:  [18-23] 18  SpO2:  [94 %-100 %] 100 %  BP: (113-143)/(61-84) 115/64     Weight: 88.9 kg (196 lb) (09/16/20 2300)  Body mass index is 28.94 kg/m².  Body surface area is 2.08 meters squared.    I/O last 3 completed shifts:  In: 150 [P.O.:150]  Out: 5770 [Urine:3220; Other:150; Stool:2400]    Physical Exam  Constitutional:       General: She is not in acute distress.  HENT:      Head: Normocephalic and atraumatic.      Mouth/Throat:      Mouth: Mucous membranes are moist.      Pharynx: No oropharyngeal exudate.   Eyes:      General:         Right eye: No discharge.         Left eye: No discharge.      Extraocular Movements: Extraocular movements intact.   Cardiovascular:      Rate and Rhythm: Normal rate and regular rhythm.      Heart sounds: No murmur.   Pulmonary:      Effort: Pulmonary effort is normal. No respiratory distress.      Breath sounds: Normal breath sounds.   Abdominal:      General: There is no distension.      Tenderness: There is no guarding.   Skin:     General: Skin is warm and dry.   Neurological:      Mental Status: She is alert and oriented to person, place, and time.   Psychiatric:         Mood and Affect: Mood normal.         Behavior: Behavior normal.         Significant Labs:  CBC:   Recent Labs   Lab 09/28/20  0448   WBC 15.07*   RBC 2.62*   HGB 6.9*   HCT 23.7*   *   MCV 91   MCH 26.3*   MCHC 29.1*     CMP:   Recent Labs    Lab 09/28/20  0448   *   CALCIUM 8.2*   ALBUMIN 1.7*   PROT 6.7   *   K 4.9   CO2 17*   *   BUN 23*   CREATININE 1.8*   ALKPHOS 101   ALT 10   AST 12   BILITOT 0.3     All labs within the past 24 hours have been reviewed.    Significant Imaging:    Retroperitoneal US 9/27:  Impression:     Mild increased cortical echogenicity bilaterally and elevated resistive indices, which can be seen in the setting of medical renal disease.     There are ureteral stents and nephrostomy tubes in place bilaterally.  No evidence of hydronephrosis.    Assessment/Plan:     ODESSA (acute kidney injury)  55 year old female with a past medical history ureteral strictures s/p nephrostomy tubes and ureteral stents with developent of ODESSA in the presence of no hydronephrosis or obstruction. Baseline Cr is 0.8-1. Cr was normal up until 9/24. ODESSA happened on 9/25 with a rise in Cr to 1.7. Hemoglobin has been around upper 6's. ODESSA possibly 2/2 pre-renal cause due to blood loss and drop in hemoglobin resulting in hypoperfusion.    Plan:  Increase maintenance fluids to at least 100ml/hr   Follow up urine studies  Avoid nephrotoxic agents (NSAIDs, ACEi, ARB, contrast)  Strict I/Os  Daily standing weights  Renally dose medications      Thank you for your consult. I will follow-up with patient. Please contact us if you have any additional questions.    Kris Tai MD PGY1  Nephrology  [facility]

## 2020-09-28 NOTE — H&P
Inpatient Radiology Pre-procedure Note    History of Present Illness:  Edita Riley is a 55 y.o. female with h/o cervical cancer s/p radiation resulting in ureteral strictures for which she underwent b/l nephrostomy tube placement and creation of a transverse colon urinary conduit to LLQ complicated by a post-operative bowel perforation, now with a RLQ end ileostomy. IR placed a RLQ drain on 9/23 into an air fluid collection concerning for abscess in the presence of persistent leukocytosis. Patient now has fungemia and IR was consulted to exchange her bilateral nephrostomy tubes (last exchanged on 8/13).     Admission H&P reviewed.    Past Medical History:   Diagnosis Date    Abnormal mammogram 8/25/2020    Anxiety     Cervical cancer 2014    Chronic back pain     Depression     Diarrhea due to malabsorption 11/14/2018    Fibromyalgia     Generalized abdominal pain 8/25/2020    GERD (gastroesophageal reflux disease)     Hiatal hernia 2014    History of cervical cancer 10/11/2018    History of cervical cancer 10/11/2018    Hx of psychiatric care     Cymbalta, trazodone    Hypertension     Hypomagnesemia 11/21/2018    Lactose intolerance     Metastatic squamous cell carcinoma to lymph node 10/11/2018    Nephrostomy tube displaced     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 drug 11/14/2018    Osteoarthritis of back     Psychiatric problem     Stroke 1972     Past Surgical History:   Procedure Laterality Date    BILATERAL OOPHORECTOMY  2015    CHOLECYSTECTOMY  11/09/2016    Done at [facility], path showed chronic cholecystitis and gallstones    cold knife conization  2014    COLECTOMY, RIGHT  9/17/2020    Procedure: COLECTOMY, RIGHT Extended;  Surgeon: Hammad Reynolds MD;  Location: [facility] OR [address] [place] [address];  Service: Colon and Rectal;;    COLONOSCOPY  2014    COLONOSCOPY N/A 10/24/2016    at [facility], Dr Thomas    COLONOSCOPY N/A 5/18/2018    Procedure: COLONOSCOPY;  Surgeon: Arden Gutiérrez,  MD;  Location: [facility] ENDO (4TH FLR);  Service: Endoscopy;  Laterality: N/A;    COLONOSCOPY N/A 7/28/2020    Procedure: COLONOSCOPY;  Surgeon: Hammad Reynolds MD;  Location: [facility] ENDO (4TH FLR);  Service: Colon and Rectal;  Laterality: N/A;  covid test [place] 7/25    CYSTOSCOPY WITH URETEROSCOPY, RETROGRADE PYELOGRAPHY, AND INSERTION OF STENT Bilateral 3/21/2020    Procedure: CYSTOSCOPY, WITH RETROGRADE PYELOGRAM,;  Surgeon: Leslie Balbuena MD;  Location: [facility] OR 1ST FLR;  Service: Urology;  Laterality: Bilateral;    ESOPHAGOGASTRODUODENOSCOPY  2014    HYSTERECTOMY  2014    TVH for cervical cancer    ILEOSTOMY  9/17/2020    Procedure: CREATION, ILEOSTOMY;  Surgeon: Hammad Reynolds MD;  Location: [facility] OR 2ND FLR;  Service: Colon and Rectal;;    MOBILIZATION OF SPLENIC FLEXURE N/A 9/11/2020    Procedure: MOBILIZATION, COLONIC;  Surgeon: Hammad Reynolds MD;  Location: [facility] OR 2ND FLR;  Service: Colon and Rectal;  Laterality: N/A;    OOPHORECTOMY      RETROPERITONEAL LYMPHADENECTOMY Right 9/17/2018    Procedure: LYMPHADENECTOMY, RETROPERITONEUM;  Surgeon: Sebas Reed MD;  Location: [facility] OR 2ND FLR;  Service: General;  Laterality: Right;  ILIAC       Review of Systems:   As documented in primary team H&P    Home Meds:   Prior to Admission medications    Medication Sig Start Date End Date Taking? Authorizing Provider   dicyclomine (BENTYL) 20 mg tablet Take 1 tablet (20 mg total) by mouth 3 (three) times daily as needed.  Patient taking differently: Take 20 mg by mouth 3 (three) times daily as needed. Take if needed 9/1/20  Yes TONI Chowdary   gabapentin (NEURONTIN) 300 MG capsule Take 1 capsule (300 mg total) by mouth 3 (three) times daily.  Patient taking differently: Take 300 mg by mouth 3 (three) times daily. Take in am of surgery 9/3/19  Yes Refugio Benjamin MD   ketoconazole (NIZORAL) 2 % cream Apply topically once daily to affected area  Patient taking differently: Apply topically once  daily. Hold am of surgery 7/20/20  Yes Audrey Mustafa MD   metroNIDAZOLE (FLAGYL) 500 MG tablet Take 1 tablet (500 mg total) by mouth As instructed. Take 1 tablet at 1pm, 2pm, 11pm the day before surgery  Patient taking differently: Take 500 mg by mouth As instructed. Take 1 tablet at 1pm, 2pm, 11pm the day before surgery  Take per surgeons instructions 9/9/20  Yes Hammad Reynolds MD   neomycin (MYCIFRADIN) 500 mg Tab Take 2 tablets (1,000 mg total) by mouth As instructed. Take 2 tablets at 1pm, 2pm, 11pm the day before surgery  Patient taking differently: Take 1,000 mg by mouth As instructed. Take 2 tablets at 1pm, 2pm, 11pm the day before surgery.  Take per surgeons instructions 9/9/20  Yes Hammad Reynolds MD   omeprazole (PRILOSEC) 40 MG capsule Take 1 capsule (40 mg total) by mouth once daily.  Patient taking differently: Take 40 mg by mouth once daily. Take in am of surgery 7/10/20 7/10/21 Yes Audrey Mustafa MD   oxyCODONE (ROXICODONE) 5 MG immediate release tablet Take 1 tablet (5 mg total) by mouth every 8 (eight) hours as needed for Pain.  Patient taking differently: Take 5 mg by mouth every 8 (eight) hours as needed for Pain. Take if needed 6/12/20  Yes Eddie Bashir DO   polyethylene glycol (GLYCOLAX) 17 gram/dose powder Take 17 g by mouth once daily.  Patient taking differently: Take 17 g by mouth once daily. Hold am of surgery 9/1/20  Yes TONI Chowdary   traZODone (DESYREL) 50 MG tablet TAKE 1 TABLET (50 MG TOTAL) BY MOUTH EVERY EVENING.  Patient taking differently: Take 50 mg by mouth every evening. Takes at night 11/13/19 11/12/20 Yes Audrey Mustafa MD   ondansetron (ZOFRAN-ODT) 4 MG TbDL Take 1 tablet (4 mg total) by mouth every 8 (eight) hours as needed (nausea or vomiting).  Patient taking differently: Take 4 mg by mouth every 8 (eight) hours as needed (nausea or vomiting). Take if needed 8/6/20   Andrew Quiñones MD     Scheduled Meds:    ceFEPime  (MAXIPIME) IVPB  1 g Intravenous Q8H    cyanocobalamin  100 mcg Intramuscular Weekly    enoxaparin  30 mg Subcutaneous Q24H    epoetin yecenia-epbx  20,000 Units Subcutaneous Q48H    loperamide  2 mg Oral BID    metoprolol tartrate  25 mg Oral BID    metroNIDAZOLE  500 mg Oral Q8H    micafungin (MYCAMINE) IVPB  100 mg Intravenous Q24H    pantoprazole  40 mg Intravenous Daily    sodium chloride 0.9%  10 mL Intravenous Q6H     Continuous Infusions:    sodium chloride 0.9% 40 mL/hr at 09/25/20 1113     PRN Meds:acetaminophen, dextrose 50%, glucagon (human recombinant), HYDROmorphone, insulin aspart U-100, iohexol, iohexol, labetalol, ondansetron, oxyCODONE-acetaminophen, oxyCODONE-acetaminophen, sodium chloride 0.9%, Flushing PICC Protocol **AND** sodium chloride 0.9% **AND** sodium chloride 0.9%, sodium chloride 0.9%  Anticoagulants/Antiplatelets: no anticoagulation    Allergies:   Review of patient's allergies indicates:   Allergen Reactions    Bee sting [allergen ext-venom-honey bee]      Rash      Grass pollen-bermuda, standard      rash     Sedation Hx: have not been any systemic reactions    Labs:  No results for input(s): INR in the last 168 hours.    Invalid input(s):  PT,  PTT    Recent Labs   Lab 09/28/20  0448   WBC 15.07*   HGB 6.9*   HCT 23.7*   MCV 91   *      Recent Labs   Lab 09/28/20  0448   *   *   K 4.9   *   CO2 17*   BUN 23*   CREATININE 1.8*   CALCIUM 8.2*   MG 2.4   ALT 10   AST 12   ALBUMIN 1.7*   BILITOT 0.3         Vitals:  Temp: 98.6 °F (37 °C) (09/28/20 0808)  Pulse: 95 (09/28/20 0300)  Resp: 18 (09/28/20 0808)  BP: 115/61 (09/28/20 0808)  SpO2: 100 % (09/28/20 0808)     Physical Exam:  ASA: 3  Mallampati: 3    General: no acute distress  Mental Status: alert and oriented to person, place and time  HEENT: normocephalic, atraumatic  Chest: unlabored breathing  Heart: regular heart rate  Abdomen: RLQ drain, RLQ ileostomy, LLQ urostomy, B/L nephrostomy tubes  draining well  Extremity: moves all extremities      Plan:  Sedation Plan: moderate  Patient will undergo exchange of bilateral nephrostomy tubes.      Beatris Echols MD   Department of Radiology  PGY IV Resident  Pager: [phone]

## 2020-09-28 NOTE — ASSESSMENT & PLAN NOTE
The patient is a 55-year-old female who is now 1 week out from a urinary diversion by Dr Balbuena due to bladder outlet obstruction from prior pelvic radiation therapy.  At time of that procedure, I procured a vascularized segment of transverse colon to be used as the urinary conduit, as requested by Urology.  A primary end-to-end, hand-sewn colocolonic anastomosis was performed at that time to restore intestinal continuity once the segment of colon to be used for the conduit had been excluded.  The patient did well for the 1st few days postoperatively but then began to develop abdominal pain and distention consistent with an ileus.  This morning, there was small amount of pneumoperitoneum seen on a plain abdominal film, and later today she began to drain feculent appearing fluid through her Kurtis-Arias drain.  She had a normal white blood cell count and did not have peritonitis on examination, so a CT scan was performed, which demonstrated a large volume of free abdominal fluid as well as a large volume of pneumoperitoneum, concerning for an anastomotic leak.  She is being brought back to the operating room for urgent exploratory laparotomy.    OR 9/17/2020    Consult wound care for ileosotmy  Naseem avughan, aubrey  Enc amb and IS  casandra smal amt of diet

## 2020-09-28 NOTE — NURSING TRANSFER
Nursing Transfer Note      9/28/2020     Transfer To: 524    Transfer via stretcher    Transfer with wound vac    Transported by transport team    Chart send with patient: Yes

## 2020-09-28 NOTE — PT/OT/SLP PROGRESS
Physical Therapy      Patient Name:  Edita Hardin [facility]   MRN:  [id number]    Patient not seen today secondary to off the floor for test  . Will follow-up next scheduled visit.    Chana Urbano, PT

## 2020-09-28 NOTE — PLAN OF CARE
Report given to ROCU. Report called to primary nurse. Specimens collected and sent. Procedure completed. Rudi neph tube exchange performed, pt tolerated well.

## 2020-09-28 NOTE — ASSESSMENT & PLAN NOTE
Blood cult poss for yeast    -consult opth  -d/c PICC  -ITA per ID, cefepine, flgayl and microfungin  -monitor culture results   -line holiday

## 2020-09-28 NOTE — ASSESSMENT & PLAN NOTE
Patient has hypokalemia which is currently uncontrolled. Last electrolytes reviewed-   Recent Labs   Lab 09/27/20  0444 09/28/20  0448   K 4.8 4.9   . Will replace potassium and monitor electrolytes closely.

## 2020-09-28 NOTE — ASSESSMENT & PLAN NOTE
55 year old female with a past medical history ureteral strictures s/p nephrostomy tubes and ureteral stents with developent of ODESSA in the presence of no hydronephrosis or obstruction. Baseline Cr is 0.8-1. Cr was normal up until 9/24. ODESSA happened on 9/25 with a rise in Cr to 1.7. Hemoglobin has been around upper 6's. ODESSA possibly 2/2 pre-renal cause due to blood loss and drop in hemoglobin resulting in hypoperfusion.    Plan:  Increase maintenance fluids to at least 100ml/hr   Follow up urine studies  Avoid nephrotoxic agents (NSAIDs, ACEi, ARB, contrast)  Strict I/Os  Daily standing weights  Renally dose medications

## 2020-09-28 NOTE — ASSESSMENT & PLAN NOTE
S/p urinary diversion with colon conduit 9/11   S/p emergent ex-lap 9/17, colo-colic anastomosis intact, bowel perforation found, underwent resection of right colon, remaining transverses colon, and proximal descending colon, ileostomy creation, Charlotte's pouch created     - CRS primary  - regular diet per Primary, NPO for cysto procedure today. Can resume diet afterwards   - continue PT/OT, OOBTC as tolerated, encourage ambulation  - Wound care ostomy consulted for assistance with ostomy teaching   - patient is a [employer]; Hgb at 7.1, management per primary and Heme-Onc (on EPO, B12, and iron)  - agree with ID recommendations: remove central lines and then repeat bcx to ensure clearance of yeast, continue micafungin for yeast coverrage, cefepime for Enterobacter and metronidazole for anaerobes, ophthalmology consult  - Drains: maintain, IR drain (Cr 0.7), ureteral stents  - Blood culture with yeast: on diflucan, ID consulted  - Prophylaxis: IS, SCDs, GI ppx    - to OR today for antegrade nephrostograms

## 2020-09-28 NOTE — HPI
Mrs. Riley is a 55 year old female with a history of cervical cancer s/p radiation resulting in ureteral strictures for which she underwent b/l nephrostomy tube placement. On 9/11 she underwent a creation of a transverse colon urinary conduit to LLQ. On 9/14 she with placement of bilateral ureteral stents. Her hospital stay was complicated by a post-operative bowel perforation now with a RLQ end ileostomy. IR placed a RLQ drain on 9/23 into an air fluid collection concerning for abscess in the presence of persistent leukocytosis. On 9/28, Urology removed the ureteral stents and clamped both nephrostomy tubes, with the expectation that ureteral peristalsis improvement.     Nephrology was consulted for ODESSA in the presence of no hydronephrosis or obstruction. Baseline Cr is around 0.8-1. Cr was normal up until 9/24. ODESSA happened on 9/25 with a rise in Cr to 1.7.

## 2020-09-28 NOTE — OP NOTE
[facility]  Operative Note    Date: 09/28/2020    Pre-Op Diagnosis: ODESSA, s/p ureterocolic conduit for bilateral ureteral strictures    Patient Active Problem List    Diagnosis Date Noted    Fungemia 09/27/2020    Hypokalemia 09/24/2020    Wound infection after surgery 09/24/2020    Peritonitis 09/22/2020    Acute blood loss anemia 09/22/2020    Moderate malnutrition 09/20/2020    Bilateral ureteral obstruction 09/11/2020    Radiation cystitis 09/04/2020    Generalized abdominal pain 08/25/2020    Abnormal mammogram 08/25/2020    Nephrostomy tube displaced 08/12/2020    Family history of colon cancer 07/28/2020    Hydronephrosis s/p bilateral nephrostomy tube 06/11/2020    Urinary tract infection associated with nephrostomy catheter 06/11/2020    Anemia due to chronic kidney disease 05/18/2020    Need for shingles vaccine 05/18/2020    ODESSA (acute kidney injury) 03/21/2020    Seizure-like activity 12/02/2018    Stroke 12/02/2018    Electrolyte disorder 12/02/2018    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 drug 11/14/2018    Diarrhea due to malabsorption 11/14/2018    Generalized anxiety disorder 10/17/2018    PTSD (post-traumatic stress disorder) 10/17/2018    History of cervical cancer 10/11/2018    Metastatic squamous cell carcinoma to lymph node 10/11/2018    Lymphadenopathy 09/17/2018    Gastroesophageal reflux disease with esophagitis 11/16/2017    Weakness 11/03/2017    Carpal tunnel syndrome on right 07/20/2017    Sleep stage dysfunction     Hemifacial spasm 09/16/2015    Severe episode of recurrent major depressive disorder, with psychotic features 09/16/2015    Fibromyalgia 09/16/2015    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    Schatzki's ring 09/14/2015    Colon polyp 09/14/2015    Internal hemorrhoids 09/14/2015    Cardiovascular event risk -- low 09/14/2015    Lower extremity pain, diffuse 07/24/2015    Weakness of both lower extremities 07/24/2015    Impaired  functional mobility, balance, gait, and endurance 07/24/2015    Osteoarthritis of back 05/04/2015    Hiatal hernia 05/04/2015    Essential hypertension 05/04/2015       Post-Op Diagnosis: same    Procedure(s) Performed:  1. Bilateral antegrade nephrostogram  2. Bilateral retrograde pyelogram  3. Bilateral ureteral stent removal  4. Fluoro < 1 h    Specimen(s): none    Staff Surgeon: Leslie Balbuena MD    Assistant Surgeon: Burton Gallardo MD    Anesthesia: none    Indications: Edita Riley is a 55 y.o. female with bilateral ureteral strictures secondary to radiation for cervical cancer. She previously had nephrostomy tubes placed (last exchanged on 8/13/20.) On 9/14 she underwent colon conduit urinary diversion with placement of bilateral ureteral stents.     Findings:   - bilateral antegrade nephrostograms showed no flow of contrast distal to the UPJ  - R RPG through ureteral stent showed flow of contrast into pelvis and distal ureter; L RPG through the ureteral stent showed contrast pooling in the conduit  - bilateral stents removed    Estimated Blood Loss: min    Drains: no new drains placed    Procedure in Detail:  After risks, benefits and possible complications of the procedure were explained, the patient elected to undergo the procedure and informed consent was obtained. All questions were answered in the socorro-operative area. The patient was transferred to the cystoscopy suite and placed in the supine position. SCDs were applied and working. She was placed in the supine position.Time out was performed.    An antegrade nephrostogram was performed through the right nephrostomy tube. Right ureteral stent appeared to be on good position. This showed a dilated pelvis with slightly blunted calyces. There was no antegrade flow of contrast beyond the UPJ. This process was repeated on the left, again showing blunted calcyces with no flow of contrast distal to the UPJ. Here, the stent appeared to have  migrated distally slightly, with the proximal coil in the proximal.     We then performed retrograde pyelograms through each ureteral stent using a blunt-tipped needle. On the right, this showed contrast entering the renal pelvis and the proximal ureter. On the left, contrast appeared to be pooling in the conduit.     The decision was made to leave both nephrostomy tubes and remove both ureteral stents. The tubes were secured and the stents were manually removed under fluoroscopy.     The patient tolerated the procedure well and was transferred back to the floor in stable condition.    Disposition: The patient will return to the care of Colorectal Surgery. Urology will continue to follow. We will clamp both nephrostomy tubes for now; we expect that ureteral peristalsis will improve now that the stents have been removed.     Burton Gallardo MD

## 2020-09-28 NOTE — ASSESSMENT & PLAN NOTE
[facility] 55 [facility] female with a history of cervical cancer s/p pelvic radiation, admitted since 09/11 s/p open transverse colon conduit urinary diversion on 9/11 c/b feculent drainage via abdominal drain on 9/17 CT scan showed large volume pneumoperitoneum and free fluid concerning for colocolonic anastomotic leak. Pt taken back to OR on 9/17.  Focal area of perforation 5cm distal to colo-colic anastomosis was found.  Right colon, remaining transverse, and proximal descending colon resected,End-ileostomy matured, Long sánchez's pouch left. 09/22 CT A/P repeated due to raising wbc which showed a moderate volume of fluid and air within the abdomen, IR placed placed RLQ drain 09/23 started spiking fever 09/24- she has been on Zosyn since 09/17 and fluconazole added 09/26 for bld clx growing yeast 09/25.    Micro:  Abdominal fluid 09/17: Enterobacter cloacae + E.hormaechei + Bacteroides + solobacterium + Klebsiella oxytoca.    Bcx 09/25: 1/4 yeast    Abx:  Zosyn 09/17  Fluconazole x1 09/27    S/p antegrade nephrostograms in cysto + bilateral stents removed 09/28,  plan for IR  bilateral neph tubes exchange today.    Recommendations:    · Remove PICC line, would hold of placing another line until clx clears.  · Continue Micafungin for broader coverage until speciation of yeats.  · Ophthalmology evaluation for fungal endophthalmitis.  · Echo to r/o vegetation.  · Continue o cefepime and flagyl for intraabdominal infections.  · Repeat Blood clx after PICC line removed.

## 2020-09-28 NOTE — CONSULTS
Consult Note Ophthalmology     Chief complaint/Reason for Consult: R/o endophthalmitis in setting of +Fungal BCx     History of Present Illness: Edita Riley is a 55 y.o. female who presents with + blood cultures for candida. Ophthalmology consulted to rule out fungal endophthalmitis. She denies any eye complaints including floaters, flashes, new blurry vision, light sensitivity, diplopia, eye pain. Currently being treated with micafungin, zosyn, and cefepime.     Past Ocular History: Reading glasses. Reports poor vision in left eye for years she attributes to stroke.  Denies wearing contacts, trauma, hx of patching, surgical interventions.     Ocular Meds: None    Family Ocular History:  Denies Fhx of blindness, glaucoma, macular degeneration.     PMHx:  has a past medical history of Abnormal mammogram (8/25/2020), Anxiety, Cervical cancer (2014), Chronic back pain, Depression, Diarrhea due to malabsorption (11/14/2018), Fibromyalgia, Generalized abdominal pain (8/25/2020), GERD (gastroesophageal reflux disease), Hiatal hernia (2014), History of cervical cancer (10/11/2018), History of cervical cancer (10/11/2018), psychiatric care, Hypertension, Hypomagnesemia (11/21/2018), Lactose intolerance, Metastatic squamous cell carcinoma to lymph node (10/11/2018), Nephrostomy tube displaced, 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 drug (11/14/2018), Osteoarthritis of back, Psychiatric problem, and Stroke (1972).     PSurgHx:  has a past surgical history that includes Esophagogastroduodenoscopy (2014); Colonoscopy (2014); Bilateral oophorectomy (2015); Colonoscopy (N/A, 10/24/2016); Cholecystectomy (11/09/2016); Oophorectomy; Colonoscopy (N/A, 5/18/2018); Hysterectomy (2014); cold knife conization (2014); Retroperitoneal lymphadenectomy (Right, 9/17/2018); Cystoscopy with ureteroscopy, retrograde pyelography, and insertion of stent (Bilateral, 3/21/2020); Colonoscopy (N/A, 7/28/2020); Mobilization of  splenic flexure (N/A, 9/11/2020); colectomy, right (9/17/2020); and Ileostomy (9/17/2020).     Home Medications:   Prior to Admission medications    Medication Sig Start Date End Date Taking? Authorizing Provider   dicyclomine (BENTYL) 20 mg tablet Take 1 tablet (20 mg total) by mouth 3 (three) times daily as needed.  Patient taking differently: Take 20 mg by mouth 3 (three) times daily as needed. Take if needed 9/1/20  Yes TONI Chowdary   gabapentin (NEURONTIN) 300 MG capsule Take 1 capsule (300 mg total) by mouth 3 (three) times daily.  Patient taking differently: Take 300 mg by mouth 3 (three) times daily. Take in am of surgery 9/3/19  Yes Refugio Benjamin MD   ketoconazole (NIZORAL) 2 % cream Apply topically once daily to affected area  Patient taking differently: Apply topically once daily. Hold am of surgery 7/20/20  Yes Audrey Mustafa MD   metroNIDAZOLE (FLAGYL) 500 MG tablet Take 1 tablet (500 mg total) by mouth As instructed. Take 1 tablet at 1pm, 2pm, 11pm the day before surgery  Patient taking differently: Take 500 mg by mouth As instructed. Take 1 tablet at 1pm, 2pm, 11pm the day before surgery  Take per surgeons instructions 9/9/20  Yes Hammad Reynolds MD   neomycin (MYCIFRADIN) 500 mg Tab Take 2 tablets (1,000 mg total) by mouth As instructed. Take 2 tablets at 1pm, 2pm, 11pm the day before surgery  Patient taking differently: Take 1,000 mg by mouth As instructed. Take 2 tablets at 1pm, 2pm, 11pm the day before surgery.  Take per surgeons instructions 9/9/20  Yes Hammad Reynolds MD   omeprazole (PRILOSEC) 40 MG capsule Take 1 capsule (40 mg total) by mouth once daily.  Patient taking differently: Take 40 mg by mouth once daily. Take in am of surgery 7/10/20 7/10/21 Yes Audrey Mustafa MD   oxyCODONE (ROXICODONE) 5 MG immediate release tablet Take 1 tablet (5 mg total) by mouth every 8 (eight) hours as needed for Pain.  Patient taking differently: Take 5 mg by mouth every 8  (eight) hours as needed for Pain. Take if needed 6/12/20  Yes Eddie Bashir,    polyethylene glycol (GLYCOLAX) 17 gram/dose powder Take 17 g by mouth once daily.  Patient taking differently: Take 17 g by mouth once daily. Hold am of surgery 9/1/20  Yes TONI Chowdary   traZODone (DESYREL) 50 MG tablet TAKE 1 TABLET (50 MG TOTAL) BY MOUTH EVERY EVENING.  Patient taking differently: Take 50 mg by mouth every evening. Takes at night 11/13/19 11/12/20 Yes Audrey Mustafa MD   ondansetron (ZOFRAN-ODT) 4 MG TbDL Take 1 tablet (4 mg total) by mouth every 8 (eight) hours as needed (nausea or vomiting).  Patient taking differently: Take 4 mg by mouth every 8 (eight) hours as needed (nausea or vomiting). Take if needed 8/6/20   Andrew Qiuñones MD        Medications this encounter:    ceFEPime (MAXIPIME) IVPB  1 g Intravenous Q8H    cyanocobalamin  100 mcg Intramuscular Weekly    enoxaparin  40 mg Subcutaneous Q24H    epoetin yecenia-epbx  20,000 Units Subcutaneous Q48H    loperamide  2 mg Oral BID    metoprolol tartrate  25 mg Oral BID    metroNIDAZOLE  500 mg Oral Q8H    micafungin (MYCAMINE) IVPB  100 mg Intravenous Q24H    pantoprazole  40 mg Intravenous Daily    sodium chloride 0.9%  10 mL Intravenous Q6H       Allergies: is allergic to bee sting [allergen ext-venom-honey bee] and grass pollen-bermuda, standard.     Social Hx:  reports that she has quit smoking. She has never used smokeless tobacco. She reports that she does not drink alcohol or use drugs.     Family Hx: family history includes Breast cancer in her maternal aunt; Cancer in her father and mother; Cervical cancer in her mother; Colon cancer in her father; Drug abuse in her daughter and daughter; Esophageal cancer in her father; Heart disease in her maternal grandmother and sister; Suicide in her daughter.     Ocular examination/Dilated fundus examination:  Base Eye Exam     Visual Acuity       Right Left    Near sc 20/40  20/70    Near cc 20/25  PHNI          Pupils       Dark Light Shape React APD    Right 3 2 Round Brisk None    Left 3 2 Round Brisk None          Visual Fields       Right Left     Full Full          Extraocular Movement       Right Left     -2 -2 -2   --  --   -- -- --    -2 -2 -2   --  --   -- -- --             Neuro/Psych     Oriented x3: Yes    Mood/Affect: Normal   Drowsy              Slit Lamp and Fundus Exam     External Exam       Right Left    External Normal Normal          Pen Light Exam       Right Left    Lids/Lashes Ptosis  Normal    Conjunctiva/Sclera White and quiet White and quiet    Cornea Clear Clear    Anterior Chamber Deep and quiet Deep and quiet    Iris Round and reactive Round and reactive    Lens Clear Clear    Vitreous Normal Normal. No haze or cell.           Fundus Exam       Right Left    Disc Normal Pink, sharp. Small yellow lesion along superior arcade     C/D Ratio 0.4 0.4    Macula Normal Normal    Vessels Normal Normal    Periphery Normal Normal                Assessment/Plan:     1.) Fungemia, rule out intraocular findings  - Pt is inpatient with positive fungal blood cultures  - Denies any ocular complaints   - On fundoscopic exam, small yellow lesion along superior arcade left eye without any associated vitritis or other lesions in retina. Likely cotton wool spot due to HTN   - Will have retina evaluate tomorrow       Fransisco Conway MD  [employer] [facility] PGY-2  9/28/2020  3:24 PM

## 2020-09-28 NOTE — SUBJECTIVE & OBJECTIVE
Subjective:     Interval History: no acute events overnite, AF, abd pain controlled with oral meds, for nephrostrogam today per urology    Post-Op Info:  Procedure(s) (LRB):  Nephrostogram - antegrade (Bilateral)  REMOVAL, STENT, URETER (Bilateral)   Day of Surgery      Medications:  Continuous Infusions:   sodium chloride 0.9% 40 mL/hr at 09/25/20 1113     Scheduled Meds:   ceFEPime (MAXIPIME) IVPB  1 g Intravenous Q8H    cyanocobalamin  100 mcg Intramuscular Weekly    enoxaparin  40 mg Subcutaneous Q24H    epoetin yecenia-epbx  20,000 Units Subcutaneous Q48H    loperamide  2 mg Oral BID    metoprolol tartrate  25 mg Oral BID    metroNIDAZOLE  500 mg Oral Q8H    micafungin (MYCAMINE) IVPB  100 mg Intravenous Q24H    pantoprazole  40 mg Intravenous Daily    sodium chloride 0.9%  10 mL Intravenous Q6H     PRN Meds:   acetaminophen    dextrose 50%    glucagon (human recombinant)    HYDROmorphone    insulin aspart U-100    iohexol    iohexol    labetalol    ondansetron    oxyCODONE-acetaminophen    oxyCODONE-acetaminophen    sodium chloride 0.9%    sodium chloride 0.9%    sodium chloride 0.9%        Objective:     Vital Signs (Most Recent):  Temp: 98.6 °F (37 °C) (09/28/20 0808)  Pulse: (!) 113 (09/28/20 1210)  Resp: 19 (09/28/20 1210)  BP: 128/64 (09/28/20 1210)  SpO2: 100 % (09/28/20 1210) Vital Signs (24h Range):  Temp:  [98 °F (36.7 °C)-100 °F (37.8 °C)] 98.6 °F (37 °C)  Pulse:  [] 113  Resp:  [18-23] 19  SpO2:  [94 %-100 %] 100 %  BP: (113-143)/(61-84) 128/64     Intake/Output - Last 3 Shifts       09/26 0700 - 09/27 0659 09/27 0700 - 09/28 0659 09/28 0700 - 09/29 0659    P.O.  150     I.V. (mL/kg)       Other 0      IV Piggyback       TPN       Total Intake(mL/kg) 0 (0) 150 (1.7)     Urine (mL/kg/hr) 1380 (0.6) 2120 (1)     Drains 30 0     Other 120 0 50    Stool 700 2050 100    Total Output 2230 4170 150    Net -2230 -4020 -150           Urine Occurrence  2 x     Stool Occurrence  0  x           Physical Exam  Constitutional:       Appearance: She is well-developed. She is not ill-appearing.   HENT:      Head: Normocephalic.   Eyes:      Pupils: Pupils are equal, round, and reactive to light.   Cardiovascular:      Rate and Rhythm: Normal rate and regular rhythm.      Heart sounds: Normal heart sounds.   Pulmonary:      Effort: Pulmonary effort is normal. No respiratory distress.      Breath sounds: Normal breath sounds. No wheezing or rales.   Abdominal:      Palpations: Abdomen is soft. There is no mass.      Tenderness: There is no guarding or rebound.      Comments: Wound vac in place  Ileostomy functional  Urostomy functional   Skin:     General: Skin is warm and dry.   Neurological:      Mental Status: She is alert and oriented to person, place, and time.   Psychiatric:         Behavior: Behavior normal.         Thought Content: Thought content normal.         Judgment: Judgment normal.           Significant Labs:  BMP (Last 3 Results):   Recent Labs   Lab 09/26/20 0520 09/27/20 0444 09/28/20 0448   * 133* 152*   * 136 135*   K 4.3 4.8 4.9    108 111*   CO2 18* 16* 17*   BUN 22* 25* 23*   CREATININE 1.7* 2.0* 1.8*   CALCIUM 7.9* 8.2* 8.2*   MG 2.2 2.5 2.4     CBC (Last 3 Results):   Recent Labs   Lab 09/26/20 0520 09/27/20 0444 09/28/20 0448   WBC 18.71* 15.95* 15.07*   RBC 2.37* 2.65* 2.62*   HGB 6.4* 7.1* 6.9*   HCT 21.5* 24.0* 23.7*   * 459* 576*   MCV 91 91 91   MCH 27.0 26.8* 26.3*   MCHC 29.8* 29.6* 29.1*       Significant Diagnostics:  None

## 2020-09-28 NOTE — ASSESSMENT & PLAN NOTE
Ms. Riley is a 55 y.o. female with b/l ureteral obstruction s/p transverse colon conduit on 9/11/2020. Extended R colectomy and ileostomy creation on 9/17.     - Diet regular  - d/c tpn  - ITA changed per ID  -  For nephrosotrogram today   - OOB, activity as tolerated  - PT/OT

## 2020-09-28 NOTE — PROGRESS NOTES
[facility]  Urology  Progress Note    Patient Name: Edita Riley  MRN: [id number]  Admission Date: 9/11/2020  Hospital Length of Stay: 17 days  Code Status: Full Code   Attending Provider: Hammad Reynolds MD   Primary Care Physician: Audrey Mustafa MD    Subjective:     HPI:  Edita Riley is a 55 y.o. female with a history of cervical cancer s/p pelvic radiation. She has since developed bilateral ureteral strictures and bilateral hydronephrosis R>L. She had a MAG3 scan confirmed presence of bilateral obstruction. She is s/p open transverse colon conduit urinary diversion on 9/11/2020.     Interval History: NAEON. Tmax 100 yesterday.   Tolerating regular diet (although now NPO for antegrade nephrostograms in cysto today), pain controlled, ambulating.  Tolerating left NT clamping trial   Blood culture with yeast. Added micafungin.     Objective:     Temp:  [98 °F (36.7 °C)-100 °F (37.8 °C)] 99 °F (37.2 °C)  Pulse:  [] 95  Resp:  [18-22] 18  SpO2:  [96 %-99 %] 98 %  BP: (113-139)/(69-81) 139/81     Body mass index is 28.94 kg/m².           Drains     Drain                 Nephrostomy 08/13/20 0805 Left 12 Fr. 42 days         Nephrostomy 08/13/20 0809 Right 12 Fr. 42 days         Urostomy 09/11/20 ileal conduit LLQ 14 days         Ileostomy 09/18/20 RLQ 7 days         Nephrostomy 09/18/20 0025 Left 7 days         Nephrostomy 09/18/20 0025 Right 7 days         Closed/Suction Drain 09/23/20 0720 Right Abdomen Bulb 10 Fr. 1 day                Physical Exam   Constitutional: No distress.   HENT:   Head: Normocephalic and atraumatic.   Eyes: Conjunctivae are normal. No scleral icterus.   Neck: Normal range of motion.   Cardiovascular: Normal rate.    Pulmonary/Chest: Effort normal. No respiratory distress.   Abdominal: Soft. She exhibits no distension. There is abdominal tenderness (appropriate). There is no rebound and no guarding.   Urostomy p/p/p draining clear yellow  urine  Ureteral stents in place  Ileostomy with light colored liquid output  IR drain LLQ draining light yellow clear fluid  Midline incision with wound vac in place    Musculoskeletal: Normal range of motion.      Comments: SCDs in place   Neurological: She is alert.   Skin: Skin is warm and dry. She is not diaphoretic.         Significant Labs:    BMP:  Recent Labs   Lab 09/26/20  0520 09/27/20 0444 09/28/20 0448   * 136 135*   K 4.3 4.8 4.9    108 111*   CO2 18* 16* 17*   BUN 22* 25* 23*   CREATININE 1.7* 2.0* 1.8*   CALCIUM 7.9* 8.2* 8.2*       CBC:   Recent Labs   Lab 09/26/20 0520 09/27/20 0444 09/28/20 0448   WBC 18.71* 15.95* 15.07*   HGB 6.4* 7.1* 6.9*   HCT 21.5* 24.0* 23.7*   * 459* 576*       All pertinent labs results from the past 24 hours have been reviewed.    Significant Imaging:  All pertinent imaging results/findings from the past 24 hours have been reviewed.        Review of Systems          Assessment/Plan:     * Bilateral ureteral obstruction  S/p urinary diversion with colon conduit 9/11   S/p emergent ex-lap 9/17, colo-colic anastomosis intact, bowel perforation found, underwent resection of right colon, remaining transverses colon, and proximal descending colon, ileostomy creation, Charlotte's pouch created     - CRS primary  - regular diet per Primary, NPO for cysto procedure today. Can resume diet afterwards   - continue PT/OT, OOBTC as tolerated, encourage ambulation  - Wound care ostomy consulted for assistance with ostomy teaching   - patient is a Mandaen; Hgb at 7.1, management per primary and Heme-Onc (on EPO, B12, and iron)  - agree with ID recommendations: remove central lines and then repeat bcx to ensure clearance of yeast, continue micafungin for yeast coverrage, cefepime for Enterobacter and metronidazole for anaerobes, ophthalmology consult  - Drains: maintain, IR drain (Cr 0.7), ureteral stents  - Blood culture with yeast: on diflucan, ID  consulted  - Prophylaxis: IS, SCDs, GI ppx    - to OR today for antegrade nephrostograms         VTE Risk Mitigation (From admission, onward)         Ordered     enoxaparin injection 30 mg  Every 24 hours      09/27/20 0917     IP VTE HIGH RISK PATIENT  Once      09/11/20 2024     Place sequential compression device  Until discontinued      09/11/20 2024                Mana Wilks MD  Urology  [facility]

## 2020-09-28 NOTE — PROCEDURES
Radiology Post-Procedure Note    Pre Op Diagnosis: right and left hydronephrosis    Post Op Diagnosis: right and left hydronephrosis    Procedure:right and left percutaneous nephrostomy tube change    Procedure performed by: Shaw Alcantara MD    Written Informed Consent Obtained: Yes    Specimen Removed: YES 20 cc cloudy urine from left nephrostomy    Estimated Blood Loss: Minimal    Findings: Local anesthesia and moderate sedation were used.      The indwelling nephrostomy tube was removed over a wire and a new 12.0 drainage catheter was placed under fluoroscopic guidance.  The drain was secured in place and dressed.      There were no complications and the patient tolerated the procedure well.  Please see Imaging report for further details.      Shaw Alcantara M.D.  Diagnostic and Interventional Radiologist  Department of Radiology  Pager: [phone]

## 2020-09-29 LAB
ALBUMIN SERPL BCP-MCNC: 1.8 G/DL (ref 3.5–5.2)
ALP SERPL-CCNC: 121 U/L (ref 55–135)
ALT SERPL W/O P-5'-P-CCNC: 12 U/L (ref 10–44)
ANION GAP SERPL CALC-SCNC: 10 MMOL/L (ref 8–16)
ANISOCYTOSIS BLD QL SMEAR: SLIGHT
ASCENDING AORTA: 2.9 CM
AST SERPL-CCNC: 21 U/L (ref 10–40)
AV INDEX (PROSTH): 0.88
AV MEAN GRADIENT: 4 MMHG
AV PEAK GRADIENT: 9 MMHG
AV VALVE AREA: 3.15 CM2
AV VELOCITY RATIO: 0.73
BACTERIA BLD CULT: NORMAL
BACTERIA BLD CULT: NORMAL
BASOPHILS # BLD AUTO: 0.05 K/UL (ref 0–0.2)
BASOPHILS NFR BLD: 0.3 % (ref 0–1.9)
BILIRUB SERPL-MCNC: 0.4 MG/DL (ref 0.1–1)
BSA FOR ECHO PROCEDURE: 2.08 M2
BUN SERPL-MCNC: 25 MG/DL (ref 6–20)
CALCIUM SERPL-MCNC: 8.4 MG/DL (ref 8.7–10.5)
CHLORIDE SERPL-SCNC: 112 MMOL/L (ref 95–110)
CO2 SERPL-SCNC: 14 MMOL/L (ref 23–29)
CREAT SERPL-MCNC: 1.8 MG/DL (ref 0.5–1.4)
CV ECHO LV RWT: 0.33 CM
DIFFERENTIAL METHOD: ABNORMAL
DOP CALC AO PEAK VEL: 1.53 M/S
DOP CALC AO VTI: 20.99 CM
DOP CALC LVOT AREA: 3.6 CM2
DOP CALC LVOT DIAMETER: 2.14 CM
DOP CALC LVOT PEAK VEL: 1.12 M/S
DOP CALC LVOT STROKE VOLUME: 66.08 CM3
DOP CALCLVOT PEAK VEL VTI: 18.38 CM
E WAVE DECELERATION TIME: 134.29 MSEC
E/A RATIO: 1.11
E/E' RATIO: 5.13 M/S
ECHO LV POSTERIOR WALL: 0.77 CM (ref 0.6–1.1)
EOSINOPHIL # BLD AUTO: 0.3 K/UL (ref 0–0.5)
EOSINOPHIL NFR BLD: 2 % (ref 0–8)
ERYTHROCYTE [DISTWIDTH] IN BLOOD BY AUTOMATED COUNT: 18.5 % (ref 11.5–14.5)
EST. GFR  (AFRICAN AMERICAN): 35.5 ML/MIN/1.73 M^2
EST. GFR  (NON AFRICAN AMERICAN): 30.8 ML/MIN/1.73 M^2
FRACTIONAL SHORTENING: 38 % (ref 28–44)
GIANT PLATELETS BLD QL SMEAR: PRESENT
GLUCOSE SERPL-MCNC: 90 MG/DL (ref 70–110)
HCT VFR BLD AUTO: 23.4 % (ref 37–48.5)
HGB BLD-MCNC: 6.8 G/DL (ref 12–16)
HYPOCHROMIA BLD QL SMEAR: ABNORMAL
IMM GRANULOCYTES # BLD AUTO: 0.11 K/UL (ref 0–0.04)
IMM GRANULOCYTES NFR BLD AUTO: 0.8 % (ref 0–0.5)
INTERVENTRICULAR SEPTUM: 0.84 CM (ref 0.6–1.1)
LA MAJOR: 4.74 CM
LA MINOR: 4.02 CM
LA WIDTH: 2.87 CM
LEFT ATRIUM SIZE: 2.77 CM
LEFT ATRIUM VOLUME INDEX: 14.4 ML/M2
LEFT ATRIUM VOLUME: 29.4 CM3
LEFT INTERNAL DIMENSION IN SYSTOLE: 2.94 CM (ref 2.1–4)
LEFT VENTRICLE DIASTOLIC VOLUME INDEX: 50.64 ML/M2
LEFT VENTRICLE DIASTOLIC VOLUME: 103.72 ML
LEFT VENTRICLE MASS INDEX: 61 G/M2
LEFT VENTRICLE SYSTOLIC VOLUME INDEX: 16.3 ML/M2
LEFT VENTRICLE SYSTOLIC VOLUME: 33.34 ML
LEFT VENTRICULAR INTERNAL DIMENSION IN DIASTOLE: 4.73 CM (ref 3.5–6)
LEFT VENTRICULAR MASS: 124.58 G
LV LATERAL E/E' RATIO: 4.92 M/S
LV SEPTAL E/E' RATIO: 5.36 M/S
LYMPHOCYTES # BLD AUTO: 1.3 K/UL (ref 1–4.8)
LYMPHOCYTES NFR BLD: 9.1 % (ref 18–48)
MAGNESIUM SERPL-MCNC: 2.1 MG/DL (ref 1.6–2.6)
MCH RBC QN AUTO: 26.2 PG (ref 27–31)
MCHC RBC AUTO-ENTMCNC: 29.1 G/DL (ref 32–36)
MCV RBC AUTO: 90 FL (ref 82–98)
MONOCYTES # BLD AUTO: 2.1 K/UL (ref 0.3–1)
MONOCYTES NFR BLD: 14.5 % (ref 4–15)
MV PEAK A VEL: 0.53 M/S
MV PEAK E VEL: 0.59 M/S
MV STENOSIS PRESSURE HALF TIME: 38.94 MS
MV VALVE AREA P 1/2 METHOD: 5.65 CM2
NEUTROPHILS # BLD AUTO: 10.7 K/UL (ref 1.8–7.7)
NEUTROPHILS NFR BLD: 73.3 % (ref 38–73)
NRBC BLD-RTO: 1 /100 WBC
OVALOCYTES BLD QL SMEAR: ABNORMAL
PHOSPHATE SERPL-MCNC: 3.8 MG/DL (ref 2.7–4.5)
PISA TR MAX VEL: 2.37 M/S
PLATELET # BLD AUTO: 626 K/UL (ref 150–350)
PLATELET BLD QL SMEAR: ABNORMAL
PMV BLD AUTO: 10.8 FL (ref 9.2–12.9)
POCT GLUCOSE: 111 MG/DL (ref 70–110)
POCT GLUCOSE: 111 MG/DL (ref 70–110)
POCT GLUCOSE: 112 MG/DL (ref 70–110)
POCT GLUCOSE: 116 MG/DL (ref 70–110)
POCT GLUCOSE: 126 MG/DL (ref 70–110)
POIKILOCYTOSIS BLD QL SMEAR: SLIGHT
POLYCHROMASIA BLD QL SMEAR: ABNORMAL
POTASSIUM SERPL-SCNC: 5.3 MMOL/L (ref 3.5–5.1)
PROT SERPL-MCNC: 6.9 G/DL (ref 6–8.4)
PULM VEIN S/D RATIO: 2
PV PEAK D VEL: 0.26 M/S
PV PEAK S VEL: 0.52 M/S
RA MAJOR: 4.47 CM
RA WIDTH: 2.75 CM
RBC # BLD AUTO: 2.6 M/UL (ref 4–5.4)
RIGHT VENTRICULAR END-DIASTOLIC DIMENSION: 3.25 CM
RV TISSUE DOPPLER FREE WALL SYSTOLIC VELOCITY 1 (APICAL 4 CHAMBER VIEW): 17.46 CM/S
SINUS: 2.7 CM
SODIUM SERPL-SCNC: 136 MMOL/L (ref 136–145)
STJ: 3.25 CM
TDI LATERAL: 0.12 M/S
TDI SEPTAL: 0.11 M/S
TDI: 0.12 M/S
TR MAX PG: 22 MMHG
TRICUSPID ANNULAR PLANE SYSTOLIC EXCURSION: 2.02 CM
WBC # BLD AUTO: 14.53 K/UL (ref 3.9–12.7)

## 2020-09-29 PROCEDURE — 84100 ASSAY OF PHOSPHORUS: CPT

## 2020-09-29 PROCEDURE — 63600175 PHARM REV CODE 636 W HCPCS: Performed by: STUDENT IN AN ORGANIZED HEALTH CARE EDUCATION/TRAINING PROGRAM

## 2020-09-29 PROCEDURE — 99233 PR SUBSEQUENT HOSPITAL CARE,LEVL III: ICD-10-PCS | Mod: ,,, | Performed by: INTERNAL MEDICINE

## 2020-09-29 PROCEDURE — 94761 N-INVAS EAR/PLS OXIMETRY MLT: CPT

## 2020-09-29 PROCEDURE — A4216 STERILE WATER/SALINE, 10 ML: HCPCS | Performed by: STUDENT IN AN ORGANIZED HEALTH CARE EDUCATION/TRAINING PROGRAM

## 2020-09-29 PROCEDURE — C9113 INJ PANTOPRAZOLE SODIUM, VIA: HCPCS | Performed by: STUDENT IN AN ORGANIZED HEALTH CARE EDUCATION/TRAINING PROGRAM

## 2020-09-29 PROCEDURE — 25000003 PHARM REV CODE 250: Performed by: STUDENT IN AN ORGANIZED HEALTH CARE EDUCATION/TRAINING PROGRAM

## 2020-09-29 PROCEDURE — 85025 COMPLETE CBC W/AUTO DIFF WBC: CPT

## 2020-09-29 PROCEDURE — 87040 BLOOD CULTURE FOR BACTERIA: CPT | Mod: 59

## 2020-09-29 PROCEDURE — 25000003 PHARM REV CODE 250: Performed by: NURSE PRACTITIONER

## 2020-09-29 PROCEDURE — 11000001 HC ACUTE MED/SURG PRIVATE ROOM

## 2020-09-29 PROCEDURE — 36415 COLL VENOUS BLD VENIPUNCTURE: CPT

## 2020-09-29 PROCEDURE — 99900035 HC TECH TIME PER 15 MIN (STAT)

## 2020-09-29 PROCEDURE — 25000003 PHARM REV CODE 250: Performed by: INTERNAL MEDICINE

## 2020-09-29 PROCEDURE — 25000242 PHARM REV CODE 250 ALT 637 W/ HCPCS: Performed by: NURSE PRACTITIONER

## 2020-09-29 PROCEDURE — 80053 COMPREHEN METABOLIC PANEL: CPT

## 2020-09-29 PROCEDURE — 83735 ASSAY OF MAGNESIUM: CPT

## 2020-09-29 PROCEDURE — 97605 NEG PRS WND THER DME<=50SQCM: CPT

## 2020-09-29 PROCEDURE — 99233 SBSQ HOSP IP/OBS HIGH 50: CPT | Mod: ,,, | Performed by: INTERNAL MEDICINE

## 2020-09-29 PROCEDURE — 94799 UNLISTED PULMONARY SVC/PX: CPT

## 2020-09-29 RX ORDER — FLUCONAZOLE 200 MG/1
400 TABLET ORAL DAILY
Status: DISCONTINUED | OUTPATIENT
Start: 2020-09-29 | End: 2020-10-13 | Stop reason: HOSPADM

## 2020-09-29 RX ORDER — HEPARIN SODIUM 5000 [USP'U]/ML
5000 INJECTION, SOLUTION INTRAVENOUS; SUBCUTANEOUS EVERY 8 HOURS
Status: DISCONTINUED | OUTPATIENT
Start: 2020-09-29 | End: 2020-10-04

## 2020-09-29 RX ADMIN — PSYLLIUM HUSK 1 PACKET: 3.4 POWDER ORAL at 09:09

## 2020-09-29 RX ADMIN — Medication 10 ML: at 06:09

## 2020-09-29 RX ADMIN — METRONIDAZOLE 500 MG: 500 TABLET ORAL at 10:09

## 2020-09-29 RX ADMIN — HEPARIN SODIUM 5000 UNITS: 5000 INJECTION INTRAVENOUS; SUBCUTANEOUS at 10:09

## 2020-09-29 RX ADMIN — Medication 10 ML: at 12:09

## 2020-09-29 RX ADMIN — SODIUM BICARBONATE: 84 INJECTION, SOLUTION INTRAVENOUS at 01:09

## 2020-09-29 RX ADMIN — HEPARIN SODIUM 5000 UNITS: 5000 INJECTION INTRAVENOUS; SUBCUTANEOUS at 05:09

## 2020-09-29 RX ADMIN — CEFEPIME 1 G: 1 INJECTION, POWDER, FOR SOLUTION INTRAMUSCULAR; INTRAVENOUS at 05:09

## 2020-09-29 RX ADMIN — CEFEPIME 1 G: 1 INJECTION, POWDER, FOR SOLUTION INTRAMUSCULAR; INTRAVENOUS at 09:09

## 2020-09-29 RX ADMIN — EPOETIN ALFA-EPBX 20000 UNITS: 10000 INJECTION, SOLUTION INTRAVENOUS; SUBCUTANEOUS at 05:09

## 2020-09-29 RX ADMIN — METRONIDAZOLE 500 MG: 500 TABLET ORAL at 06:09

## 2020-09-29 RX ADMIN — LOPERAMIDE HYDROCHLORIDE 2 MG: 2 CAPSULE ORAL at 09:09

## 2020-09-29 RX ADMIN — METOPROLOL TARTRATE 25 MG: 25 TABLET, FILM COATED ORAL at 08:09

## 2020-09-29 RX ADMIN — FLUCONAZOLE 400 MG: 200 TABLET ORAL at 11:09

## 2020-09-29 RX ADMIN — CEFEPIME 1 G: 1 INJECTION, POWDER, FOR SOLUTION INTRAMUSCULAR; INTRAVENOUS at 12:09

## 2020-09-29 RX ADMIN — METOPROLOL TARTRATE 25 MG: 25 TABLET, FILM COATED ORAL at 09:09

## 2020-09-29 RX ADMIN — METRONIDAZOLE 500 MG: 500 TABLET ORAL at 01:09

## 2020-09-29 RX ADMIN — LOPERAMIDE HYDROCHLORIDE 2 MG: 2 CAPSULE ORAL at 08:09

## 2020-09-29 RX ADMIN — PANTOPRAZOLE SODIUM 40 MG: 40 INJECTION, POWDER, LYOPHILIZED, FOR SOLUTION INTRAVENOUS at 09:09

## 2020-09-29 NOTE — ASSESSMENT & PLAN NOTE
Blood cult poss for yeast    -consult opth, appreciate input  -d/c PICC  -ITA per ID, cefepine, flgayl and diflucan  -monitor culture results   -line holiday

## 2020-09-29 NOTE — PROGRESS NOTES
Progress Note  Ophthalmology      SUBJECTIVE:   Interval history:    Patient seen this morning. No new complaints. Vision at baseline. No eye pain, no floaters.     Meds:   Current Facility-Administered Medications:     0.9%  NaCl infusion, , Intravenous, Continuous, Nolan Hartman MD, Last Rate: 100 mL/hr at 09/28/20 1613    acetaminophen tablet 650 mg, 650 mg, Oral, Q6H PRN, Kerri Castillo NP, 650 mg at 09/27/20 2118    ceFEPIme injection 1 g, 1 g, Intravenous, Q8H, Marie Meneses MD, 1 g at 09/29/20 0058    cyanocobalamin injection 100 mcg, 100 mcg, Intramuscular, Weekly, Anoop Arevalo MD, 100 mcg at 09/26/20 0923    dextrose 50% injection 12.5 g, 12.5 g, Intravenous, PRN, Kerri Castillo NP    epoetin yecenia-epbx injection 20,000 Units, 20,000 Units, Subcutaneous, Q48H, Nicky Madsen MD, 20,000 Units at 09/27/20 1711    glucagon (human recombinant) injection 1 mg, 1 mg, Intramuscular, PRN, Kerri Castillo NP    heparin (porcine) injection 5,000 Units, 5,000 Units, Subcutaneous, Q8H, Moe Mclean MD    HYDROmorphone injection 0.5 mg, 0.5 mg, Intravenous, Q6H PRN, Andria Hua NP    insulin aspart U-100 pen 1-10 Units, 1-10 Units, Subcutaneous, Q4H PRN, Kerri Castillo NP, 4 Units at 09/28/20 1727    iohexol (OMNIPAQUE) oral solution 15 mL, 15 mL, Oral, PRN, Emelina Carlson MD, 15 mL at 09/17/20 1600    iohexol (OMNIPAQUE) oral solution 15 mL, 15 mL, Oral, PRN, Emelina Carlson MD    labetalol 20 mg/4 mL (5 mg/mL) IV syring, 10 mg, Intravenous, Q4H PRN, Fransisco Espino MD, 10 mg at 09/22/20 0344    loperamide capsule 2 mg, 2 mg, Oral, BID, Cecile C Nolan, MD, 2 mg at 09/28/20 2125    metoprolol tartrate (LOPRESSOR) tablet 25 mg, 25 mg, Oral, BID, Kerri Castillo NP, 25 mg at 09/28/20 2125    metroNIDAZOLE tablet 500 mg, 500 mg, Oral, Q8H, Marie Meneses MD, 500 mg at 09/29/20 0648    micafungin 100 mg in sodium chloride 0.9 % 100 mL IVPB  (ready to mix system), 100 mg, Intravenous, Q24H, Marie Meneses MD, Last Rate: 100 mL/hr at 09/28/20 1309, 100 mg at 09/28/20 1309    ondansetron injection 4 mg, 4 mg, Intravenous, Q6H PRN, Kerri Castillo NP, 4 mg at 09/23/20 1157    oxyCODONE-acetaminophen  mg per tablet 1 tablet, 1 tablet, Oral, Q4H PRN, Andria Hua NP    oxyCODONE-acetaminophen 5-325 mg per tablet 1 tablet, 1 tablet, Oral, Q4H PRN, Andria Hua NP    pantoprazole injection 40 mg, 40 mg, Intravenous, Daily, Moe Mclean MD, 40 mg at 09/28/20 0838    psyllium husk (aspartame) 3.4 gram packet 1 packet, 1 packet, Oral, Daily, Kerri Castillo NP    sodium chloride 0.9% flush 10 mL, 10 mL, Intravenous, PRN, Mana Wilks MD    Flushing PICC Protocol, , , Until Discontinued **AND** sodium chloride 0.9% flush 10 mL, 10 mL, Intravenous, Q6H, 10 mL at 09/29/20 0600 **AND** sodium chloride 0.9% flush 10 mL, 10 mL, Intravenous, PRN, Moe Mclean MD    sodium chloride 0.9% flush 3 mL, 3 mL, Intravenous, PRN, Mana Wilks MD    OBJECTIVE:       Vital Signs (Most Recent):  Vitals:    09/29/20 0738   BP: 132/70   Pulse: 101   Resp: 14   Temp: 98.8 °F (37.1 °C)         Eye Examination:    Base Eye Exam     Visual Acuity       Right Left    Near cc 20/25-1 30/30          Tonometry (Tonopen, 8:47 AM)       Right Left    Pressure 16 17          Pupils       Dark Light Shape React APD    Right 3 2 Round Brisk None    Left 3 2 Round Brisk None          Visual Fields       Right Left     Full Full          Extraocular Movement       Right Left     -2 -2 -2   --  --   -- -- --    -2 -2 -2   --  --   -- -- --             Neuro/Psych     Oriented x3: Yes    Mood/Affect: Normal   Drowsy              Slit Lamp and Fundus Exam     External Exam       Right Left    External Normal Normal          Pen Light Exam       Right Left    Lids/Lashes Normal Ptosis     Conjunctiva/Sclera White and  quiet White and quiet    Cornea Clear Clear    Anterior Chamber Deep and quiet Deep and quiet    Iris Round and reactive Round and reactive    Lens Clear Clear    Vitreous Normal Normal. No haze or cell.           Fundus Exam       Right Left    Disc Normal Pink, sharp. Small yellow, discrete lesion along superior arcade.     C/D Ratio 0.4 0.4    Macula Normal Normal    Vessels Normal Normal    Periphery Normal Normal. No other lesions noted.                 ASSESSMENT/PLAN:     1. Candida Retinitis, left eye   - + blood cultures for Candida   - Exam with Va 20/30 left eye. No evidence of AC reax. No hypopyon. Small, yellow creamy lesion along superior arcade without surrounding changes. No other lesions noted. Vitreous clear without haze or cell.   - Recommend transitioning to systemic antifungal with better ocular penetration, such as fluconazole.   - We will continue to follow patient during treatment     Discussed with retina staff, Dr. Hien Conway MD PGY-2  [facility]   9/29/2020  8:40 AM

## 2020-09-29 NOTE — ASSESSMENT & PLAN NOTE
S/p urinary diversion with colon conduit 9/11   S/p emergent ex-lap 9/17, colo-colic anastomosis intact, bowel perforation found, underwent resection of right colon, remaining transverses colon, and proximal descending colon, ileostomy creation, Charlotte's pouch created     - CRS primary  - regular diet per Primary  - continue PT/OT, OOBTC as tolerated, encourage ambulation  - Wound care ostomy consulted for assistance with ostomy teaching   - patient is a Yazidi; Hgb at 6.8, management per primary and Heme-Onc (on EPO, B12, and iron)  - agree with ID recommendations: remove central lines and then repeat bcx to ensure clearance of yeast, continue micafungin for yeast coverrage, cefepime for Enterobacter and metronidazole for anaerobes, ophthalmology consult  - Nephrology and ophtho consulted for fungemia, appreciate recs  - Drains: maintain, IR drain (Cr 0.7), NTs  - Prophylaxis: IS, SCDs, GI ppx

## 2020-09-29 NOTE — PROGRESS NOTES
[facility]  Colorectal Surgery  Progress Note    Patient Name: Edita Riley  MRN: [id number]  Admission Date: 9/11/2020  Hospital Length of Stay: 18 days  Attending Physician: Hammad Reynolds MD    Subjective:     Interval History: no acute events overnite, feeling better, casandra diet    Post-Op Info:  Procedure(s) (LRB):  Nephrostogram - antegrade (Bilateral)  REMOVAL, STENT, URETER (Bilateral)   1 Day Post-Op      Medications:  Continuous Infusions:   sodium bicarbonate drip       Scheduled Meds:   ceFEPime (MAXIPIME) IVPB  1 g Intravenous Q8H    cyanocobalamin  100 mcg Intramuscular Weekly    epoetin yecenia-epbx  20,000 Units Subcutaneous Q48H    fluconazole  400 mg Oral Daily    heparin (porcine)  5,000 Units Subcutaneous Q8H    loperamide  2 mg Oral BID    metoprolol tartrate  25 mg Oral BID    metroNIDAZOLE  500 mg Oral Q8H    pantoprazole  40 mg Intravenous Daily    psyllium husk (aspartame)  1 packet Oral Daily    sodium chloride 0.9%  10 mL Intravenous Q6H     PRN Meds:   acetaminophen    dextrose 50%    glucagon (human recombinant)    HYDROmorphone    insulin aspart U-100    iohexol    iohexol    labetalol    ondansetron    oxyCODONE-acetaminophen    oxyCODONE-acetaminophen    sodium chloride 0.9%    sodium chloride 0.9%    sodium chloride 0.9%        Objective:     Vital Signs (Most Recent):  Temp: 98.1 °F (36.7 °C) (09/29/20 1138)  Pulse: 93 (09/29/20 1138)  Resp: 18 (09/29/20 1138)  BP: (!) 109/59 (09/29/20 1138)  SpO2: 95 % (09/29/20 1138) Vital Signs (24h Range):  Temp:  [98 °F (36.7 °C)-98.9 °F (37.2 °C)] 98.1 °F (36.7 °C)  Pulse:  [] 93  Resp:  [14-24] 18  SpO2:  [95 %-100 %] 95 %  BP: (109-132)/(59-77) 109/59     Intake/Output - Last 3 Shifts       09/27 0700 - 09/28 0659 09/28 0700 - 09/29 0659 09/29 0700 - 09/30 0659    P.O. 150 360     I.V. (mL/kg)  1200 (13.5)     Other       Total Intake(mL/kg) 150 (1.7) 1560 (17.5)     Urine (mL/kg/hr)  2120 (1) 1250 (0.6)     Drains 0 0     Other 0 200     Stool 2050 300     Total Output 4170 1750     Net -4020 -190            Urine Occurrence 2 x      Stool Occurrence 0 x            Physical Exam  Constitutional:       Appearance: She is well-developed. She is not ill-appearing.   HENT:      Head: Normocephalic.   Eyes:      Pupils: Pupils are equal, round, and reactive to light.   Cardiovascular:      Rate and Rhythm: Normal rate and regular rhythm.      Heart sounds: Normal heart sounds.   Pulmonary:      Effort: Pulmonary effort is normal. No respiratory distress.      Breath sounds: Normal breath sounds. No wheezing or rales.   Abdominal:      Palpations: Abdomen is soft. There is no mass.      Tenderness: There is no guarding or rebound.      Comments: Wound vac intact  IR drain with purulent drainage  Urostomy functional  Ileostomy functional   Skin:     General: Skin is warm and dry.   Neurological:      Mental Status: She is alert and oriented to person, place, and time.   Psychiatric:         Behavior: Behavior normal.         Thought Content: Thought content normal.         Judgment: Judgment normal.           Significant Labs:  BMP (Last 3 Results):   Recent Labs   Lab 09/27/20  0444 09/28/20 0448 09/29/20  0355   * 152* 90    135* 136   K 4.8 4.9 5.3*    111* 112*   CO2 16* 17* 14*   BUN 25* 23* 25*   CREATININE 2.0* 1.8* 1.8*   CALCIUM 8.2* 8.2* 8.4*   MG 2.5 2.4 2.1     CBC (Last 3 Results):   Recent Labs   Lab 09/27/20  0444 09/28/20  0448 09/29/20  0355   WBC 15.95* 15.07* 14.53*   RBC 2.65* 2.62* 2.60*   HGB 7.1* 6.9* 6.8*   HCT 24.0* 23.7* 23.4*   * 576* 626*   MCV 91 91 90   MCH 26.8* 26.3* 26.2*   MCHC 29.6* 29.1* 29.1*       Significant Diagnostics:  None    Assessment/Plan:     * Bilateral ureteral obstruction  Ms. Riley is a 55 y.o. female with b/l ureteral obstruction s/p transverse colon conduit on 9/11/2020. Extended R colectomy and ileostomy creation on  9/17.     - Diet regular with low K  - d/c tpn  - ITA changed per ID  -  nephrosotrogram 9/28   - OOB, activity as tolerated  - PT/OT        Fungemia  Blood cult poss for yeast    -consult opth, appreciate input  -d/c PICC  -ITA per ID, cefepine, flgayl and diflucan  -monitor culture results   -line holiday    Wound infection after surgery  Wound now open, local wound care, poss wound vac  ITA per ID    Hypokalemia  Patient has hypokalemia which is currently uncontrolled. Last electrolytes reviewed-   Recent Labs   Lab 09/28/20  0448 09/29/20  0355   K 4.9 5.3*   . Will replace potassium and monitor electrolytes closely.     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  At time of that procedure, I procured a vascularized segment of transverse colon to be used as the urinary conduit, as requested by Urology.  A primary end-to-end, hand-sewn colocolonic anastomosis was performed at that time to restore intestinal continuity once the segment of colon to be used for the conduit had been excluded.  The patient did well for the 1st few days postoperatively but then began to develop abdominal pain and distention consistent with an ileus.  This morning, there was small amount of pneumoperitoneum seen on a plain abdominal film, and later today she began to drain feculent appearing fluid through her Kurtis-Arias drain.  She had a normal white blood cell count and did not have peritonitis on examination, so a CT scan was performed, which demonstrated a large volume of free abdominal fluid as well as a large volume of pneumoperitoneum, concerning for an anastomotic leak.  She is being brought back to the operating room for urgent  exploratory laparotomy.    OR 9/17/2020    Consult wound care for ileosotmy  Naseem vaughan, scd  Enc amb and IS  casandra smal amt of diet  Cont ITA per ID recommendations    Moderate malnutrition  Consult dietary  TPN dced  Monitor labs  Appreciate dietary inp;ut    ODESSA (acute kidney injury)  Monitor I&O  Appreciate neph input  Change IVF to d5w with 3 amps of NaBicarb due to renal acidosis  Renal dose meds    Severe episode of recurrent major depressive disorder, with psychotic features  Cont home m eds    Impaired functional mobility, balance, gait, and endurance  PT/OT    Essential hypertension  Chronic, controlled.  Latest blood pressure and vitals reviewed-   Temp:  [98 °F (36.7 °C)-98.9 °F (37.2 °C)]   Pulse:  []   Resp:  [14-24]   BP: (109-132)/(59-77)   SpO2:  [95 %-99 %] .   Home meds for hypertension were reviewed and noted below. Hospital anti-hypertensive changes were made as shown below.  Hospital Medications             labetalol 20 mg/4 mL (5 mg/mL) IV syring 10 mg, Intravenous, Every 4 hours PRN, 1. Confirm patient on continuous cardiac monitor (obtain order if needed)<BR>2. Obstetrics is exempt from continuous cardiac monitoring. <BR>3. Monitor ECG rhythm throughout administrations noting rhythm and changes<BR>4. Monitor BP and HR pre-administration, post-administration, and every 30 minutes x1 after dose given<BR>5. Administer Labetalol at rate no faster than 10mg over 1 minute.<BR>6. Contraindications: HR &lt;50, SBP&lt;100, second or third degree AV block. If assessed, hold dose and call provider.        Will utilize p.r.n. blood pressure medication only if patient's blood pressure greater than  180/110 and she develops symptoms such as worsening chest pain or shortness of breath.          Kerri Castillo NP  Colorectal Surgery  [facility]

## 2020-09-29 NOTE — ASSESSMENT & PLAN NOTE
Patient has hypokalemia which is currently uncontrolled. Last electrolytes reviewed-   Recent Labs   Lab 09/28/20  0448 09/29/20  0355   K 4.9 5.3*   . Will replace potassium and monitor electrolytes closely.

## 2020-09-29 NOTE — ASSESSMENT & PLAN NOTE
55 year old female with a past medical history ureteral strictures s/p nephrostomy tubes and ureteral stents with developent of ODESSA in the presence of no hydronephrosis or obstruction. Baseline Cr is 0.8-1. Cr was normal up until 9/24. ODESSA happened on 9/25 with a rise in Cr to 1.7. Hemoglobin has been around upper 6's. ODESSA possibly 2/2 pre-renal cause due to blood loss and drop in hemoglobin resulting in hypoperfusion. Urine studies suggest prerenal ODESSA.    Plan:  Switch maintenance fluids to D5W with 3 amps of bicarb to match urine output to avoid further prerenal insult  Low K diet  Avoid nephrotoxic agents (NSAIDs, ACEi, ARB, contrast)  Strict I/Os  Daily standing weights  Renally dose medications

## 2020-09-29 NOTE — ASSESSMENT & PLAN NOTE
[facility] 55 [facility] female with a history of cervical cancer s/p pelvic radiation, admitted since 09/11 s/p open transverse colon conduit urinary diversion on 9/11 c/b colocolonic anastomotic leak, ttaken back to OR on 9/17 and found with focal area of perforation 5cm distal to colo-colic anastomosis. 09/22 CT A/P repeated due to rise in wbc and showed a moderate volume of fluid and air within the abdomen, s/p IR RLQ drain placement 09/23. Febrile since 09/24. On Zosyn since 09/17 and fluconazole added 09/26 for bld clx growing yeast 09/25. Switched to dory 9/27.    Micro:  Abdominal fluid 09/17: Enterobacter cloacae + E.hormaechei + Bacteroides + solobacterium + Klebsiella oxytoca.    BCx 09/25: 1/4 candida lusitaniea  BCx 09/29: NGTD    --S/p antegrade nephrostograms in cysto + bilateral stent removal and bilateral neph tube exchange 9/28   --PICC line removed  --Appreciate ophtha eval. 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     Recommendations:    · Switch dory to fluconazole for better eye penetration (ordered).  · F/U Echo to r/o vegetation. (ordered)  · Continue cefepime and flagyl for intraabdominal infection.  · Recommend repeat imaging of abdomen to assess for resolution of fluid collection in approximately 2 weeks or sooner if febrile or if worsening clinically.  · F/U Repeat Blood Cx  · F/U susceptibilities for candida lusitaniea requested on 9/29

## 2020-09-29 NOTE — SUBJECTIVE & OBJECTIVE
Interval History: Patient feels good today. Her appetite is good. Denies chest pain, shortness of breath, nausea, vomiting, abdominal pain.    Review of patient's allergies indicates:   Allergen Reactions    Bee sting [allergen ext-venom-honey bee]      Rash      Grass pollen-bermuda, standard      rash     Current Facility-Administered Medications   Medication Frequency    0.9%  NaCl infusion Continuous    acetaminophen tablet 650 mg Q6H PRN    ceFEPIme injection 1 g Q8H    cyanocobalamin injection 100 mcg Weekly    dextrose 50% injection 12.5 g PRN    epoetin yecenia-epbx injection 20,000 Units Q48H    glucagon (human recombinant) injection 1 mg PRN    heparin (porcine) injection 5,000 Units Q8H    HYDROmorphone injection 0.5 mg Q6H PRN    insulin aspart U-100 pen 1-10 Units Q4H PRN    iohexol (OMNIPAQUE) oral solution 15 mL PRN    iohexol (OMNIPAQUE) oral solution 15 mL PRN    labetalol 20 mg/4 mL (5 mg/mL) IV syring Q4H PRN    loperamide capsule 2 mg BID    metoprolol tartrate (LOPRESSOR) tablet 25 mg BID    metroNIDAZOLE tablet 500 mg Q8H    micafungin 100 mg in sodium chloride 0.9 % 100 mL IVPB (ready to mix system) Q24H    ondansetron injection 4 mg Q6H PRN    oxyCODONE-acetaminophen  mg per tablet 1 tablet Q4H PRN    oxyCODONE-acetaminophen 5-325 mg per tablet 1 tablet Q4H PRN    pantoprazole injection 40 mg Daily    psyllium husk (aspartame) 3.4 gram packet 1 packet Daily    sodium chloride 0.9% flush 10 mL PRN    sodium chloride 0.9% flush 10 mL Q6H    And    sodium chloride 0.9% flush 10 mL PRN    sodium chloride 0.9% flush 3 mL PRN       Objective:     Vital Signs (Most Recent):  Temp: 98.8 °F (37.1 °C) (09/29/20 0738)  Pulse: 101 (09/29/20 0738)  Resp: 14 (09/29/20 0738)  BP: 132/70 (09/29/20 0738)  SpO2: 98 % (09/29/20 0738)  O2 Device (Oxygen Therapy): room air (09/29/20 0124) Vital Signs (24h Range):  Temp:  [98 °F (36.7 °C)-98.9 °F (37.2 °C)] 98.8 °F (37.1 °C)  Pulse:   [] 101  Resp:  [14-24] 14  SpO2:  [94 %-100 %] 98 %  BP: (115-143)/(59-84) 132/70     Weight: 88.9 kg (196 lb) (09/16/20 2300)  Body mass index is 28.94 kg/m².  Body surface area is 2.08 meters squared.    I/O last 3 completed shifts:  In: 1710 [P.O.:510; I.V.:1200]  Out: 3600 [Urine:2150; Other:200; Stool:1250]    Physical Exam  Constitutional:       General: She is not in acute distress.  HENT:      Head: Normocephalic and atraumatic.      Mouth/Throat:      Mouth: Mucous membranes are moist.      Pharynx: No oropharyngeal exudate.   Eyes:      General:         Right eye: No discharge.         Left eye: No discharge.      Extraocular Movements: Extraocular movements intact.   Cardiovascular:      Rate and Rhythm: Normal rate and regular rhythm.      Heart sounds: No murmur.   Pulmonary:      Effort: Pulmonary effort is normal. No respiratory distress.      Breath sounds: Normal breath sounds.   Abdominal:      General: There is no distension.      Tenderness: There is no guarding.   Skin:     General: Skin is warm and dry.   Neurological:      Mental Status: She is alert and oriented to person, place, and time.   Psychiatric:         Mood and Affect: Mood normal.         Behavior: Behavior normal.         Significant Labs:  CBC:   Recent Labs   Lab 09/29/20  0355   WBC 14.53*   RBC 2.60*   HGB 6.8*   HCT 23.4*   *   MCV 90   MCH 26.2*   MCHC 29.1*     CMP:   Recent Labs   Lab 09/29/20  0355   GLU 90   CALCIUM 8.4*   ALBUMIN 1.8*   PROT 6.9      K 5.3*   CO2 14*   *   BUN 25*   CREATININE 1.8*   ALKPHOS 121   ALT 12   AST 21   BILITOT 0.4     Recent Labs   Lab 09/28/20  1729   COLORU Yellow   SPECGRAV 1.025   PHUR 6.0   PROTEINUA 1+*   BACTERIA Moderate*   NITRITE Negative   LEUKOCYTESUR 2+*   HYALINECASTS 0     All labs within the past 24 hours have been reviewed.     Significant Imaging:    IR Nephrostomy tube change 9/28:  Impression:     Bilateral nephrostomy tube exchange as described  above.     Plan:     With no surgical intervention is planned, the patient should have nephrostomy tubes exchange in 3 to 6 months.

## 2020-09-29 NOTE — PROGRESS NOTES
Wound care follow up:  Noted vac cannister with creamy drainage and after further investigation, creamy drainage is NOT from abdominal wounds but from incision with remaining staples near umbilicus.  Notified Dr. Reynolds's team and changed wound vac dressing incorporating y-connector to allow Dr. Reynolds's team to evaluate incision. Notified nurse of care provided. [phone]    Creamy malodorus drainage to mid abdomen incision      Upper midline abdominal wound- full thickness, mostly granular but unable to visualize base into depth  8x4x3.5cm Undermining 10-12 o'clock 1cm. Moderate serosanguineous drainage. No odor.  Lower midline abdominal wound- full thickness, mostly granular but unable to visualize base into depth  5x3x3.5cm Tunneling@ 12 and 6 o'clock 1cm. No odor. Moderate serosanguineous drainage.

## 2020-09-29 NOTE — ASSESSMENT & PLAN NOTE
Ms. Riley is a 55 y.o. female with b/l ureteral obstruction s/p transverse colon conduit on 9/11/2020. Extended R colectomy and ileostomy creation on 9/17.     - Diet regular with low K  - d/c tpn  - ITA changed per ID  -  nephrosotrogram 9/28   - OOB, activity as tolerated  - PT/OT

## 2020-09-29 NOTE — ASSESSMENT & PLAN NOTE
The patient is a 55-year-old female who is now 1 week out from a urinary diversion by Dr Balbuena due to bladder outlet obstruction from prior pelvic radiation therapy.  At time of that procedure, I procured a vascularized segment of transverse colon to be used as the urinary conduit, as requested by Urology.  A primary end-to-end, hand-sewn colocolonic anastomosis was performed at that time to restore intestinal continuity once the segment of colon to be used for the conduit had been excluded.  The patient did well for the 1st few days postoperatively but then began to develop abdominal pain and distention consistent with an ileus.  This morning, there was small amount of pneumoperitoneum seen on a plain abdominal film, and later today she began to drain feculent appearing fluid through her Kurtis-Arias drain.  She had a normal white blood cell count and did not have peritonitis on examination, so a CT scan was performed, which demonstrated a large volume of free abdominal fluid as well as a large volume of pneumoperitoneum, concerning for an anastomotic leak.  She is being brought back to the operating room for urgent exploratory laparotomy.    OR 9/17/2020    Consult wound care for ileosotmy  Naseem vaughan, scd  Enc amb and IS  casandra smal amt of diet  Cont ITA per ID recommendations

## 2020-09-29 NOTE — PROGRESS NOTES
[facility]  Nephrology  Progress Note    Patient Name: Edita Riley  MRN: [id number]  Admission Date: 9/11/2020  Hospital Length of Stay: 18 days  Attending Provider: Hammad Reynolds MD   Primary Care Physician: Audrey Mustafa MD  Principal Problem:Bilateral ureteral obstruction    Subjective:     HPI: Mrs. Riley is a 55 year old female with a history of cervical cancer s/p radiation resulting in ureteral strictures for which she underwent b/l nephrostomy tube placement. On 9/11 she underwent a creation of a transverse colon urinary conduit to LLQ. On 9/14 she with placement of bilateral ureteral stents. Her hospital stay was complicated by a post-operative bowel perforation now with a RLQ end ileostomy. IR placed a RLQ drain on 9/23 into an air fluid collection concerning for abscess in the presence of persistent leukocytosis. On 9/28, Urology removed the ureteral stents and clamped both nephrostomy tubes, with the expectation that ureteral peristalsis improvement.     Nephrology was consulted for ODESSA in the presence of no hydronephrosis or obstruction. Baseline Cr is around 0.8-1. Cr was normal up until 9/24. ODESSA happened on 9/25 with a rise in Cr to 1.7.    Interval History: Patient feels good today. Her appetite is good. Denies chest pain, shortness of breath, nausea, vomiting, abdominal pain.    Review of patient's allergies indicates:   Allergen Reactions    Bee sting [allergen ext-venom-honey bee]      Rash      Grass pollen-bermuda, standard      rash     Current Facility-Administered Medications   Medication Frequency    0.9%  NaCl infusion Continuous    acetaminophen tablet 650 mg Q6H PRN    ceFEPIme injection 1 g Q8H    cyanocobalamin injection 100 mcg Weekly    dextrose 50% injection 12.5 g PRN    epoetin yecenia-epbx injection 20,000 Units Q48H    glucagon (human recombinant) injection 1 mg PRN    heparin (porcine) injection 5,000 Units Q8H    HYDROmorphone  injection 0.5 mg Q6H PRN    insulin aspart U-100 pen 1-10 Units Q4H PRN    iohexol (OMNIPAQUE) oral solution 15 mL PRN    iohexol (OMNIPAQUE) oral solution 15 mL PRN    labetalol 20 mg/4 mL (5 mg/mL) IV syring Q4H PRN    loperamide capsule 2 mg BID    metoprolol tartrate (LOPRESSOR) tablet 25 mg BID    metroNIDAZOLE tablet 500 mg Q8H    micafungin 100 mg in sodium chloride 0.9 % 100 mL IVPB (ready to mix system) Q24H    ondansetron injection 4 mg Q6H PRN    oxyCODONE-acetaminophen  mg per tablet 1 tablet Q4H PRN    oxyCODONE-acetaminophen 5-325 mg per tablet 1 tablet Q4H PRN    pantoprazole injection 40 mg Daily    psyllium husk (aspartame) 3.4 gram packet 1 packet Daily    sodium chloride 0.9% flush 10 mL PRN    sodium chloride 0.9% flush 10 mL Q6H    And    sodium chloride 0.9% flush 10 mL PRN    sodium chloride 0.9% flush 3 mL PRN       Objective:     Vital Signs (Most Recent):  Temp: 98.8 °F (37.1 °C) (09/29/20 0738)  Pulse: 101 (09/29/20 0738)  Resp: 14 (09/29/20 0738)  BP: 132/70 (09/29/20 0738)  SpO2: 98 % (09/29/20 0738)  O2 Device (Oxygen Therapy): room air (09/29/20 0124) Vital Signs (24h Range):  Temp:  [98 °F (36.7 °C)-98.9 °F (37.2 °C)] 98.8 °F (37.1 °C)  Pulse:  [] 101  Resp:  [14-24] 14  SpO2:  [94 %-100 %] 98 %  BP: (115-143)/(59-84) 132/70     Weight: 88.9 kg (196 lb) (09/16/20 2300)  Body mass index is 28.94 kg/m².  Body surface area is 2.08 meters squared.    I/O last 3 completed shifts:  In: 1710 [P.O.:510; I.V.:1200]  Out: 3600 [Urine:2150; Other:200; Stool:1250]    Physical Exam  Constitutional:       General: She is not in acute distress.  HENT:      Head: Normocephalic and atraumatic.      Mouth/Throat:      Mouth: Mucous membranes are moist.      Pharynx: No oropharyngeal exudate.   Eyes:      General:         Right eye: No discharge.         Left eye: No discharge.      Extraocular Movements: Extraocular movements intact.   Cardiovascular:      Rate and Rhythm:  Tachycardic and regular rhythm.      Heart sounds: No murmur.   Pulmonary:      Effort: Pulmonary effort is normal. No respiratory distress.      Breath sounds: Normal breath sounds.   Abdominal:      General: There is no distension.      Tenderness: There is no guarding.   Skin:     General: Skin is warm and dry.   Neurological:      Mental Status: She is alert and oriented to person, place, and time.   Psychiatric:         Mood and Affect: Mood normal.         Behavior: Behavior normal.         Significant Labs:  CBC:   Recent Labs   Lab 09/29/20  0355   WBC 14.53*   RBC 2.60*   HGB 6.8*   HCT 23.4*   *   MCV 90   MCH 26.2*   MCHC 29.1*     CMP:   Recent Labs   Lab 09/29/20  0355   GLU 90   CALCIUM 8.4*   ALBUMIN 1.8*   PROT 6.9      K 5.3*   CO2 14*   *   BUN 25*   CREATININE 1.8*   ALKPHOS 121   ALT 12   AST 21   BILITOT 0.4     Recent Labs   Lab 09/28/20  1729   COLORU Yellow   SPECGRAV 1.025   PHUR 6.0   PROTEINUA 1+*   BACTERIA Moderate*   NITRITE Negative   LEUKOCYTESUR 2+*   HYALINECASTS 0     All labs within the past 24 hours have been reviewed.     Significant Imaging:    IR Nephrostomy tube change 9/28:  Impression:     Bilateral nephrostomy tube exchange as described above.     Plan:     With no surgical intervention is planned, the patient should have nephrostomy tubes exchange in 3 to 6 months.       Assessment/Plan:     ODESSA (acute kidney injury)  55 year old female with a past medical history ureteral strictures s/p nephrostomy tubes and ureteral stents with developent of ODESSA in the presence of no hydronephrosis or obstruction. Baseline Cr is 0.8-1. Cr was normal up until 9/24. ODESSA happened on 9/25 with a rise in Cr to 1.7. Hemoglobin has been around upper 6's. ODESSA possibly 2/2 pre-renal cause due to blood loss and drop in hemoglobin resulting in hypoperfusion. Urine studies suggest prerenal ODESSA.    Plan:  Switch maintenance fluids to D5W with 3 amps of bicarb to match urine output  to avoid further prerenal insult  Low K diet  Avoid nephrotoxic agents (NSAIDs, ACEi, ARB, contrast)  Strict I/Os  Daily standing weights  Renally dose medications          Thank you for your consult. I will follow-up with patient. Please contact us if you have any additional questions.    Kris Tai MD PGY1  Nephrology  [facility]

## 2020-09-29 NOTE — ASSESSMENT & PLAN NOTE
Chronic, controlled.  Latest blood pressure and vitals reviewed-   Temp:  [98 °F (36.7 °C)-98.9 °F (37.2 °C)]   Pulse:  []   Resp:  [14-24]   BP: (109-132)/(59-77)   SpO2:  [95 %-99 %] .   Home meds for hypertension were reviewed and noted below. Hospital anti-hypertensive changes were made as shown below.  Hospital Medications             labetalol 20 mg/4 mL (5 mg/mL) IV syring 10 mg, Intravenous, Every 4 hours PRN, 1. Confirm patient on continuous cardiac monitor (obtain order if needed)<BR>2. Obstetrics is exempt from continuous cardiac monitoring. <BR>3. Monitor ECG rhythm throughout administrations noting rhythm and changes<BR>4. Monitor BP and HR pre-administration, post-administration, and every 30 minutes x1 after dose given<BR>5. Administer Labetalol at rate no faster than 10mg over 1 minute.<BR>6. Contraindications: HR &lt;50, SBP&lt;100, second or third degree AV block. If assessed, hold dose and call provider.        Will utilize p.r.n. blood pressure medication only if patient's blood pressure greater than  180/110 and she develops symptoms such as worsening chest pain or shortness of breath.

## 2020-09-29 NOTE — ASSESSMENT & PLAN NOTE
Monitor I&O  Appreciate neph input  Change IVF to d5w with 3 amps of NaBicarb due to renal acidosis  Renal dose meds

## 2020-09-29 NOTE — SUBJECTIVE & OBJECTIVE
Interval History: NAEON. Afebrile over last 24 hours. VSS.  Tolerating regular diet, pain controlled, ambulating.  Taken to cysto yesterday for antegrade/retrogrades. Contrast filled collecting system and stents removed.  NTs draining well overnight. Clamped on AM rounds.  Blood culture with yeast. Added micafungin.  Fluids running at 100.  Seen by ophtho yesterday, appreciate recs.    Objective:     Temp:  [98 °F (36.7 °C)-98.9 °F (37.2 °C)] 98 °F (36.7 °C)  Pulse:  [] 105  Resp:  [18-24] 24  SpO2:  [94 %-100 %] 98 %  BP: (115-143)/(59-84) 118/77     Body mass index is 28.94 kg/m².           Drains     Drain                 Nephrostomy 08/13/20 0805 Left 12 Fr. 42 days         Nephrostomy 08/13/20 0809 Right 12 Fr. 42 days         Urostomy 09/11/20 ileal conduit LLQ 14 days         Ileostomy 09/18/20 RLQ 7 days         Nephrostomy 09/18/20 0025 Left 7 days         Nephrostomy 09/18/20 0025 Right 7 days         Closed/Suction Drain 09/23/20 0720 Right Abdomen Bulb 10 Fr. 1 day                Physical Exam   Constitutional: No distress.   HENT:   Head: Normocephalic and atraumatic.   Eyes: Conjunctivae are normal. No scleral icterus.   Neck: Normal range of motion.   Cardiovascular: Normal rate.    Pulmonary/Chest: Effort normal. No respiratory distress.   Abdominal: Soft. She exhibits no distension. There is abdominal tenderness (appropriate). There is no rebound and no guarding.   Urostomy p/p/p draining clear yellow urine  Ureteral stents in place  Ileostomy with light colored liquid output  IR drain LLQ draining light yellow clear fluid  Midline incision with wound vac in place    Musculoskeletal: Normal range of motion.      Comments: SCDs in place   Neurological: She is alert.   Skin: Skin is warm and dry. She is not diaphoretic.         Significant Labs:    BMP:  Recent Labs   Lab 09/27/20  0444 09/28/20  0448 09/29/20  0355    135* 136   K 4.8 4.9 5.3*    111* 112*   CO2 16* 17* 14*   BUN  25* 23* 25*   CREATININE 2.0* 1.8* 1.8*   CALCIUM 8.2* 8.2* 8.4*       CBC:   Recent Labs   Lab 09/27/20  0444 09/28/20  0448 09/29/20  0355   WBC 15.95* 15.07* 14.53*   HGB 7.1* 6.9* 6.8*   HCT 24.0* 23.7* 23.4*   * 576* 626*       All pertinent labs results from the past 24 hours have been reviewed.    Significant Imaging:  All pertinent imaging results/findings from the past 24 hours have been reviewed.

## 2020-09-29 NOTE — PROGRESS NOTES
[facility]  Infectious Disease  Progress Note    Patient Name: Edita Riley  MRN: [id number]  Admission Date: 9/11/2020  Length of Stay: 18 days  Attending Physician: Hammad Reynolds MD  Primary Care Provider: Audrey Mustafa MD    Isolation Status: No active isolations  Assessment/Plan:      Fungemia  Edita Riley is a 55 y.o. female with a history of cervical cancer s/p pelvic radiation, admitted since 09/11 s/p open transverse colon conduit urinary diversion on 9/11 c/b colocolonic anastomotic leak, ttaken back to OR on 9/17 and found with focal area of perforation 5cm distal to colo-colic anastomosis. 09/22 CT A/P repeated due to rise in wbc and showed a moderate volume of fluid and air within the abdomen, s/p IR RLQ drain placement 09/23. Febrile since 09/24. On Zosyn since 09/17 and fluconazole added 09/26 for bld clx growing yeast 09/25. Switched to dory 9/27.    Micro:  Abdominal fluid 09/17: Enterobacter cloacae + E.hormaechei + Bacteroides + solobacterium + Klebsiella oxytoca.    BCx 09/25: 1/4 candida lusitaniea  BCx 09/29: NGTD    --S/p antegrade nephrostograms in cysto + bilateral stent removal and bilateral neph tube exchange 9/28   --PICC line removed  --Appreciate ophtha eval. 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     Recommendations:    · Switch dory to fluconazole for better eye penetration (ordered).  · F/U Echo to r/o vegetation. (ordered)  · Continue cefepime and flagyl for intraabdominal infection.  · Recommend repeat imaging of abdomen to assess for resolution of fluid collection in approximately 2 weeks or sooner if febrile or if worsening clinically.  · F/U Repeat Blood Cx  · F/U susceptibilities for candida lusitaniea requested on 9/29        Anticipated Disposition: tbd    Thank you for your consult. I will follow-up with patient. Please contact us if you have any additional questions.    Michelle Mohamud MD  Infectious Disease  [facility]    Subjective:     Principal Problem:Bilateral ureteral obstruction    HPI: Edita Riley is a 55 y.o. female with a history of cervical cancer s/p pelvic radiation, admitted since 09/11 s/p open transverse colon conduit urinary diversion on 9/11 c/b feculent drainage via abdominal drain on 9/17 CT scan showed large volume pneumoperitoneum and free fluid concerning for colocolonic anastomotic leak. Pt taken back to OR on 9/17.  Focal area of perforation 5cm distal to colo-colic anastomosis was found.  Right colon, remaining transverse, and proximal descending colon resected,End-ileostomy matured, Long sánchez's pouch left. 09/22 CT A/P repeated due to raising wbc which showed a moderate volume of fluid and air within the abdomen, IR placed placed RLQ drain 09/23 started spiking fever 09/24- she has been on Zosyn since 09/17 and fluconazole added 09/26 for bld clx growing yeast 09/25. ID consulted for fungemia.  Interval History: Afebrile and HD stable overnight. Underwent nephrostomy tube exchange yesterday. Pt without abdominal pain, fevers, chills, N/V.    Review of Systems   Constitutional: Negative for chills and fever.   HENT: Negative for congestion and dental problem.    Eyes: Negative for visual disturbance.   Respiratory: Negative for cough and shortness of breath.    Cardiovascular: Negative for chest pain and leg swelling.   Gastrointestinal: Negative for abdominal pain, diarrhea and vomiting.   Genitourinary: Positive for difficulty urinating. Negative for dysuria.   Musculoskeletal: Negative for arthralgias and back pain.   Skin: Negative for color change and pallor.   Neurological: Negative for dizziness and headaches.   Psychiatric/Behavioral: Negative for behavioral problems and confusion.   All other systems reviewed and are negative.    Objective:     Vital Signs (Most Recent):  Temp: 98.1 °F (36.7 °C) (09/29/20 1138)  Pulse: 93 (09/29/20 1138)  Resp: 18 (09/29/20  1138)  BP: (!) 109/59 (09/29/20 1138)  SpO2: 95 % (09/29/20 1138) Vital Signs (24h Range):  Temp:  [98 °F (36.7 °C)-98.9 °F (37.2 °C)] 98.1 °F (36.7 °C)  Pulse:  [] 93  Resp:  [14-24] 18  SpO2:  [95 %-99 %] 95 %  BP: (109-132)/(59-77) 109/59     Weight: 88.9 kg (196 lb)  Body mass index is 28.94 kg/m².    Estimated Creatinine Clearance: 41 mL/min (A) (based on SCr of 1.8 mg/dL (H)).    Physical Exam  Vitals signs and nursing note reviewed.   Constitutional:       Appearance: Normal appearance.      Comments: Ill appearing   HENT:      Head: Normocephalic and atraumatic.      Nose: Nose normal.      Mouth/Throat:      Mouth: Mucous membranes are dry.   Eyes:      Extraocular Movements: Extraocular movements intact.      Pupils: Pupils are equal, round, and reactive to light.   Cardiovascular:      Rate and Rhythm: Normal rate and regular rhythm.   Abdominal:      General: Abdomen is flat.      Comments: Vertical incision, nephrostomy tubes in places, RLQ IR drain with serous output, ostomy in place with brown fecal content   Musculoskeletal: Normal range of motion.         General: No swelling.   Skin:     General: Skin is warm and dry.   Neurological:      General: No focal deficit present.      Mental Status: She is alert and oriented to person, place, and time.   Psychiatric:         Mood and Affect: Mood normal.         Behavior: Behavior normal.         Significant Labs: All pertinent labs within the past 24 hours have been reviewed.    Significant Imaging: I have reviewed all pertinent imaging results/findings within the past 24 hours.

## 2020-09-29 NOTE — PLAN OF CARE
Plan of care reviewed with patient.  Patient verbalized understanding and had no further questions.  Patient had no complaints of pain throughout the day.  Wound care replaced wound vac dressing in AM and wound vac working properly throughout shift.  Patient now resting comfortably in bed locked in lowest position, side rails up x3, and call bell in reach.  Will continue to monitor.

## 2020-09-29 NOTE — PROGRESS NOTES
[facility]  Urology  Progress Note    Patient Name: Edita Riley  MRN: [id number]  Admission Date: 9/11/2020  Hospital Length of Stay: 18 days  Code Status: Full Code   Attending Provider: Hammad Reynolds MD   Primary Care Physician: Audrey Mustafa MD    Subjective:     HPI:  Edita Riley is a 55 y.o. female with a history of cervical cancer s/p pelvic radiation. She has since developed bilateral ureteral strictures and bilateral hydronephrosis R>L. She had a MAG3 scan confirmed presence of bilateral obstruction. She is s/p open transverse colon conduit urinary diversion on 9/11/2020.     Interval History: NAEON. Afebrile over last 24 hours. VSS.  Tolerating regular diet, pain controlled, ambulating.  Taken to cysto yesterday for antegrade/retrogrades. Contrast filled collecting system and stents removed.  NTs draining well overnight. Clamped on AM rounds.  Blood culture with yeast. Added micafungin.  Fluids running at 100.  Seen by ophtho yesterday, appreciate recs.    Objective:     Temp:  [98 °F (36.7 °C)-98.9 °F (37.2 °C)] 98 °F (36.7 °C)  Pulse:  [] 105  Resp:  [18-24] 24  SpO2:  [94 %-100 %] 98 %  BP: (115-143)/(59-84) 118/77     Body mass index is 28.94 kg/m².           Drains     Drain                 Nephrostomy 08/13/20 0805 Left 12 Fr. 42 days         Nephrostomy 08/13/20 0809 Right 12 Fr. 42 days         Urostomy 09/11/20 ileal conduit LLQ 14 days         Ileostomy 09/18/20 RLQ 7 days         Nephrostomy 09/18/20 0025 Left 7 days         Nephrostomy 09/18/20 0025 Right 7 days         Closed/Suction Drain 09/23/20 0720 Right Abdomen Bulb 10 Fr. 1 day                Physical Exam   Constitutional: No distress.   HENT:   Head: Normocephalic and atraumatic.   Eyes: Conjunctivae are normal. No scleral icterus.   Neck: Normal range of motion.   Cardiovascular: Normal rate.    Pulmonary/Chest: Effort normal. No respiratory distress.   Abdominal: Soft. She  exhibits no distension. There is abdominal tenderness (appropriate). There is no rebound and no guarding.   Urostomy p/p/p draining clear yellow urine  Ureteral stents in place  Ileostomy with light colored liquid output  IR drain LLQ draining light yellow clear fluid  Midline incision with wound vac in place    Musculoskeletal: Normal range of motion.      Comments: SCDs in place   Neurological: She is alert.   Skin: Skin is warm and dry. She is not diaphoretic.         Significant Labs:    BMP:  Recent Labs   Lab 09/27/20 0444 09/28/20 0448 09/29/20  0355    135* 136   K 4.8 4.9 5.3*    111* 112*   CO2 16* 17* 14*   BUN 25* 23* 25*   CREATININE 2.0* 1.8* 1.8*   CALCIUM 8.2* 8.2* 8.4*       CBC:   Recent Labs   Lab 09/27/20 0444 09/28/20 0448 09/29/20  0355   WBC 15.95* 15.07* 14.53*   HGB 7.1* 6.9* 6.8*   HCT 24.0* 23.7* 23.4*   * 576* 626*       All pertinent labs results from the past 24 hours have been reviewed.    Significant Imaging:  All pertinent imaging results/findings from the past 24 hours have been reviewed.        Assessment/Plan:     * Bilateral ureteral obstruction  S/p urinary diversion with colon conduit 9/11   S/p emergent ex-lap 9/17, colo-colic anastomosis intact, bowel perforation found, underwent resection of right colon, remaining transverses colon, and proximal descending colon, ileostomy creation, Charlotte's pouch created     - CRS primary  - regular diet per Primary  - continue PT/OT, OOBTC as tolerated, encourage ambulation  - Wound care ostomy consulted for assistance with ostomy teaching   - patient is a Yarsani; Hgb at 6.8, management per primary and Heme-Onc (on EPO, B12, and iron)  - agree with ID recommendations: remove central lines and then repeat bcx to ensure clearance of yeast, continue micafungin for yeast coverrage, cefepime for Enterobacter and metronidazole for anaerobes, ophthalmology consult  - Nephrology and ophtho consulted for  fungemia, appreciate recs  - Drains: maintain, IR drain (Cr 0.7), NTs  - Prophylaxis: IS, SCDs, GI ppx        VTE Risk Mitigation (From admission, onward)         Ordered     heparin (porcine) injection 5,000 Units  Every 8 hours      09/29/20 0553     IP VTE HIGH RISK PATIENT  Once      09/11/20 2024     Place sequential compression device  Until discontinued      09/11/20 2024                Nolan Hartman MD  Urology  [facility]

## 2020-09-29 NOTE — SUBJECTIVE & OBJECTIVE
Interval History: Afebrile and HD stable overnight. Underwent nephrostomy tube exchange yesterday. Pt without abdominal pain, fevers, chills, N/V.    Review of Systems   Constitutional: Negative for chills and fever.   HENT: Negative for congestion and dental problem.    Eyes: Negative for visual disturbance.   Respiratory: Negative for cough and shortness of breath.    Cardiovascular: Negative for chest pain and leg swelling.   Gastrointestinal: Negative for abdominal pain, diarrhea and vomiting.   Genitourinary: Positive for difficulty urinating. Negative for dysuria.   Musculoskeletal: Negative for arthralgias and back pain.   Skin: Negative for color change and pallor.   Neurological: Negative for dizziness and headaches.   Psychiatric/Behavioral: Negative for behavioral problems and confusion.   All other systems reviewed and are negative.    Objective:     Vital Signs (Most Recent):  Temp: 98.1 °F (36.7 °C) (09/29/20 1138)  Pulse: 93 (09/29/20 1138)  Resp: 18 (09/29/20 1138)  BP: (!) 109/59 (09/29/20 1138)  SpO2: 95 % (09/29/20 1138) Vital Signs (24h Range):  Temp:  [98 °F (36.7 °C)-98.9 °F (37.2 °C)] 98.1 °F (36.7 °C)  Pulse:  [] 93  Resp:  [14-24] 18  SpO2:  [95 %-99 %] 95 %  BP: (109-132)/(59-77) 109/59     Weight: 88.9 kg (196 lb)  Body mass index is 28.94 kg/m².    Estimated Creatinine Clearance: 41 mL/min (A) (based on SCr of 1.8 mg/dL (H)).    Physical Exam  Vitals signs and nursing note reviewed.   Constitutional:       Appearance: Normal appearance.      Comments: Ill appearing   HENT:      Head: Normocephalic and atraumatic.      Nose: Nose normal.      Mouth/Throat:      Mouth: Mucous membranes are dry.   Eyes:      Extraocular Movements: Extraocular movements intact.      Pupils: Pupils are equal, round, and reactive to light.   Cardiovascular:      Rate and Rhythm: Normal rate and regular rhythm.   Abdominal:      General: Abdomen is flat.      Comments: Vertical incision, nephrostomy tubes  in places, RLQ IR drain with serous output, ostomy in place with brown fecal content   Musculoskeletal: Normal range of motion.         General: No swelling.   Skin:     General: Skin is warm and dry.   Neurological:      General: No focal deficit present.      Mental Status: She is alert and oriented to person, place, and time.   Psychiatric:         Mood and Affect: Mood normal.         Behavior: Behavior normal.         Significant Labs: All pertinent labs within the past 24 hours have been reviewed.    Significant Imaging: I have reviewed all pertinent imaging results/findings within the past 24 hours.

## 2020-09-30 DIAGNOSIS — K65.9 PERITONITIS: Primary | ICD-10-CM

## 2020-09-30 LAB
ALBUMIN SERPL BCP-MCNC: 1.8 G/DL (ref 3.5–5.2)
ALP SERPL-CCNC: 106 U/L (ref 55–135)
ALT SERPL W/O P-5'-P-CCNC: 10 U/L (ref 10–44)
ANION GAP SERPL CALC-SCNC: 10 MMOL/L (ref 8–16)
AST SERPL-CCNC: 15 U/L (ref 10–40)
BACTERIA BLD CULT: NORMAL
BACTERIA SPEC ANAEROBE CULT: NORMAL
BASOPHILS # BLD AUTO: 0.05 K/UL (ref 0–0.2)
BASOPHILS NFR BLD: 0.4 % (ref 0–1.9)
BILIRUB SERPL-MCNC: 0.3 MG/DL (ref 0.1–1)
BUN SERPL-MCNC: 21 MG/DL (ref 6–20)
CALCIUM SERPL-MCNC: 8.3 MG/DL (ref 8.7–10.5)
CHLORIDE SERPL-SCNC: 106 MMOL/L (ref 95–110)
CO2 SERPL-SCNC: 22 MMOL/L (ref 23–29)
CREAT SERPL-MCNC: 1.5 MG/DL (ref 0.5–1.4)
DIFFERENTIAL METHOD: ABNORMAL
EOSINOPHIL # BLD AUTO: 0.4 K/UL (ref 0–0.5)
EOSINOPHIL NFR BLD: 3.3 % (ref 0–8)
ERYTHROCYTE [DISTWIDTH] IN BLOOD BY AUTOMATED COUNT: 18.5 % (ref 11.5–14.5)
EST. GFR  (AFRICAN AMERICAN): 44.3 ML/MIN/1.73 M^2
EST. GFR  (NON AFRICAN AMERICAN): 38.4 ML/MIN/1.73 M^2
GLUCOSE SERPL-MCNC: 121 MG/DL (ref 70–110)
HCT VFR BLD AUTO: 22.1 % (ref 37–48.5)
HGB BLD-MCNC: 6.5 G/DL (ref 12–16)
IMM GRANULOCYTES # BLD AUTO: 0.11 K/UL (ref 0–0.04)
IMM GRANULOCYTES NFR BLD AUTO: 0.9 % (ref 0–0.5)
LYMPHOCYTES # BLD AUTO: 1.5 K/UL (ref 1–4.8)
LYMPHOCYTES NFR BLD: 11.7 % (ref 18–48)
MAGNESIUM SERPL-MCNC: 1.8 MG/DL (ref 1.6–2.6)
MCH RBC QN AUTO: 26.3 PG (ref 27–31)
MCHC RBC AUTO-ENTMCNC: 29.4 G/DL (ref 32–36)
MCV RBC AUTO: 90 FL (ref 82–98)
MONOCYTES # BLD AUTO: 1.7 K/UL (ref 0.3–1)
MONOCYTES NFR BLD: 13.2 % (ref 4–15)
NEUTROPHILS # BLD AUTO: 9.1 K/UL (ref 1.8–7.7)
NEUTROPHILS NFR BLD: 70.5 % (ref 38–73)
NRBC BLD-RTO: 1 /100 WBC
PHOSPHATE SERPL-MCNC: 3.2 MG/DL (ref 2.7–4.5)
PLATELET # BLD AUTO: 611 K/UL (ref 150–350)
PMV BLD AUTO: 10.5 FL (ref 9.2–12.9)
POCT GLUCOSE: 116 MG/DL (ref 70–110)
POCT GLUCOSE: 134 MG/DL (ref 70–110)
POCT GLUCOSE: 138 MG/DL (ref 70–110)
POCT GLUCOSE: 150 MG/DL (ref 70–110)
POTASSIUM SERPL-SCNC: 4.4 MMOL/L (ref 3.5–5.1)
PROT SERPL-MCNC: 6.6 G/DL (ref 6–8.4)
RBC # BLD AUTO: 2.47 M/UL (ref 4–5.4)
SODIUM SERPL-SCNC: 138 MMOL/L (ref 136–145)
WBC # BLD AUTO: 12.82 K/UL (ref 3.9–12.7)

## 2020-09-30 PROCEDURE — 63600175 PHARM REV CODE 636 W HCPCS: Performed by: STUDENT IN AN ORGANIZED HEALTH CARE EDUCATION/TRAINING PROGRAM

## 2020-09-30 PROCEDURE — 25000242 PHARM REV CODE 250 ALT 637 W/ HCPCS: Performed by: NURSE PRACTITIONER

## 2020-09-30 PROCEDURE — 85025 COMPLETE CBC W/AUTO DIFF WBC: CPT

## 2020-09-30 PROCEDURE — 99232 PR SUBSEQUENT HOSPITAL CARE,LEVL II: ICD-10-PCS | Mod: ,,, | Performed by: NURSE PRACTITIONER

## 2020-09-30 PROCEDURE — 25000003 PHARM REV CODE 250: Performed by: STUDENT IN AN ORGANIZED HEALTH CARE EDUCATION/TRAINING PROGRAM

## 2020-09-30 PROCEDURE — A4216 STERILE WATER/SALINE, 10 ML: HCPCS | Performed by: STUDENT IN AN ORGANIZED HEALTH CARE EDUCATION/TRAINING PROGRAM

## 2020-09-30 PROCEDURE — C9113 INJ PANTOPRAZOLE SODIUM, VIA: HCPCS | Performed by: STUDENT IN AN ORGANIZED HEALTH CARE EDUCATION/TRAINING PROGRAM

## 2020-09-30 PROCEDURE — 11000001 HC ACUTE MED/SURG PRIVATE ROOM

## 2020-09-30 PROCEDURE — 83735 ASSAY OF MAGNESIUM: CPT

## 2020-09-30 PROCEDURE — 99233 PR SUBSEQUENT HOSPITAL CARE,LEVL III: ICD-10-PCS | Mod: ,,, | Performed by: HOSPITALIST

## 2020-09-30 PROCEDURE — 97535 SELF CARE MNGMENT TRAINING: CPT

## 2020-09-30 PROCEDURE — 99233 PR SUBSEQUENT HOSPITAL CARE,LEVL III: ICD-10-PCS | Mod: ,,, | Performed by: INTERNAL MEDICINE

## 2020-09-30 PROCEDURE — 36415 COLL VENOUS BLD VENIPUNCTURE: CPT

## 2020-09-30 PROCEDURE — 25000003 PHARM REV CODE 250: Performed by: NURSE PRACTITIONER

## 2020-09-30 PROCEDURE — 25000003 PHARM REV CODE 250: Performed by: INTERNAL MEDICINE

## 2020-09-30 PROCEDURE — 97606 NEG PRS WND THER DME>50 SQCM: CPT

## 2020-09-30 PROCEDURE — 97530 THERAPEUTIC ACTIVITIES: CPT

## 2020-09-30 PROCEDURE — 80053 COMPREHEN METABOLIC PANEL: CPT

## 2020-09-30 PROCEDURE — 99233 SBSQ HOSP IP/OBS HIGH 50: CPT | Mod: ,,, | Performed by: INTERNAL MEDICINE

## 2020-09-30 PROCEDURE — 99232 SBSQ HOSP IP/OBS MODERATE 35: CPT | Mod: ,,, | Performed by: NURSE PRACTITIONER

## 2020-09-30 PROCEDURE — 99233 SBSQ HOSP IP/OBS HIGH 50: CPT | Mod: ,,, | Performed by: HOSPITALIST

## 2020-09-30 PROCEDURE — 84100 ASSAY OF PHOSPHORUS: CPT

## 2020-09-30 RX ADMIN — HEPARIN SODIUM 5000 UNITS: 5000 INJECTION INTRAVENOUS; SUBCUTANEOUS at 06:09

## 2020-09-30 RX ADMIN — CEFEPIME 1 G: 1 INJECTION, POWDER, FOR SOLUTION INTRAMUSCULAR; INTRAVENOUS at 12:09

## 2020-09-30 RX ADMIN — PANTOPRAZOLE SODIUM 40 MG: 40 INJECTION, POWDER, LYOPHILIZED, FOR SOLUTION INTRAVENOUS at 09:09

## 2020-09-30 RX ADMIN — CEFEPIME 1 G: 1 INJECTION, POWDER, FOR SOLUTION INTRAMUSCULAR; INTRAVENOUS at 04:09

## 2020-09-30 RX ADMIN — LOPERAMIDE HYDROCHLORIDE 2 MG: 2 CAPSULE ORAL at 09:09

## 2020-09-30 RX ADMIN — SODIUM BICARBONATE: 84 INJECTION, SOLUTION INTRAVENOUS at 02:09

## 2020-09-30 RX ADMIN — HEPARIN SODIUM 5000 UNITS: 5000 INJECTION INTRAVENOUS; SUBCUTANEOUS at 09:09

## 2020-09-30 RX ADMIN — FLUCONAZOLE 400 MG: 200 TABLET ORAL at 09:09

## 2020-09-30 RX ADMIN — CEFEPIME 1 G: 1 INJECTION, POWDER, FOR SOLUTION INTRAMUSCULAR; INTRAVENOUS at 09:09

## 2020-09-30 RX ADMIN — METOPROLOL TARTRATE 25 MG: 25 TABLET, FILM COATED ORAL at 09:09

## 2020-09-30 RX ADMIN — METRONIDAZOLE 500 MG: 500 TABLET ORAL at 02:09

## 2020-09-30 RX ADMIN — METRONIDAZOLE 500 MG: 500 TABLET ORAL at 09:09

## 2020-09-30 RX ADMIN — Medication 10 ML: at 12:09

## 2020-09-30 RX ADMIN — HEPARIN SODIUM 5000 UNITS: 5000 INJECTION INTRAVENOUS; SUBCUTANEOUS at 02:09

## 2020-09-30 RX ADMIN — PSYLLIUM HUSK 1 PACKET: 3.4 POWDER ORAL at 09:09

## 2020-09-30 RX ADMIN — METRONIDAZOLE 500 MG: 500 TABLET ORAL at 06:09

## 2020-09-30 NOTE — PROGRESS NOTES
[facility]  Colorectal Surgery  Progress Note    Patient Name: Edita Riley  MRN: [id number]  Admission Date: 9/11/2020  Hospital Length of Stay: 19 days  Attending Physician: Hammad Reynolds MD    Subjective:     Interval History: no acute events overnite, no n/v, eating well, ostomy functional    Post-Op Info:  Procedure(s) (LRB):  Nephrostogram - antegrade (Bilateral)  REMOVAL, STENT, URETER (Bilateral)   2 Days Post-Op      Medications:  Continuous Infusions:   sodium bicarbonate drip 100 mL/hr at 09/30/20 1436     Scheduled Meds:   ceFEPime (MAXIPIME) IVPB  1 g Intravenous Q8H    cyanocobalamin  100 mcg Intramuscular Weekly    epoetin yecenia-epbx  20,000 Units Subcutaneous Q48H    fluconazole  400 mg Oral Daily    heparin (porcine)  5,000 Units Subcutaneous Q8H    loperamide  2 mg Oral BID    metoprolol tartrate  25 mg Oral BID    metroNIDAZOLE  500 mg Oral Q8H    pantoprazole  40 mg Intravenous Daily    psyllium husk (aspartame)  1 packet Oral Daily    sodium chloride 0.9%  10 mL Intravenous Q6H     PRN Meds:   acetaminophen    dextrose 50%    glucagon (human recombinant)    HYDROmorphone    insulin aspart U-100    iohexol    iohexol    labetalol    ondansetron    oxyCODONE-acetaminophen    oxyCODONE-acetaminophen    sodium chloride 0.9%    sodium chloride 0.9%    sodium chloride 0.9%        Objective:     Vital Signs (Most Recent):  Temp: 98 °F (36.7 °C) (09/30/20 1100)  Pulse: 96 (09/30/20 1100)  Resp: 18 (09/30/20 1100)  BP: (!) 126/58 (09/30/20 1100)  SpO2: 98 % (09/30/20 1100) Vital Signs (24h Range):  Temp:  [98 °F (36.7 °C)-99.8 °F (37.7 °C)] 98 °F (36.7 °C)  Pulse:  [] 96  Resp:  [18-25] 18  SpO2:  [97 %-100 %] 98 %  BP: (112-126)/(58-76) 126/58     Intake/Output - Last 3 Shifts       09/28 0700 - 09/29 0659 09/29 0700 - 09/30 0659 09/30 0700 - 10/01 0659    P.O. 360 1200     I.V. (mL/kg) 1200 (13.5) 1855 (20.9)     Total Intake(mL/kg) 1560  (17.5) 3055 (34.4)     Urine (mL/kg/hr) 1250 (0.6) 2000 (0.9) 700 (1)    Drains 0 0 5    Other 200 0 0    Stool 300 500 200    Total Output 1750 2500 905    Net -190 +555 -905           Stool Occurrence  0 x           Physical Exam  Vitals signs and nursing note reviewed.   Constitutional:       Appearance: She is well-developed. She is not ill-appearing.   HENT:      Head: Normocephalic.   Eyes:      Pupils: Pupils are equal, round, and reactive to light.   Cardiovascular:      Rate and Rhythm: Normal rate and regular rhythm.      Heart sounds: Normal heart sounds.   Pulmonary:      Effort: Pulmonary effort is normal. No respiratory distress.      Breath sounds: Normal breath sounds. No wheezing or rales.   Abdominal:      Palpations: Abdomen is soft. There is no mass.      Tenderness: There is no guarding or rebound.      Comments: Urostomy and ileosotmy functikonal  Wound vac intact   Skin:     General: Skin is warm and dry.   Neurological:      Mental Status: She is alert and oriented to person, place, and time.   Psychiatric:         Behavior: Behavior normal.         Thought Content: Thought content normal.         Judgment: Judgment normal.           Significant Labs:  BMP (Last 3 Results):   Recent Labs   Lab 09/28/20 0448 09/29/20  0355 09/30/20  0424   * 90 121*   * 136 138   K 4.9 5.3* 4.4   * 112* 106   CO2 17* 14* 22*   BUN 23* 25* 21*   CREATININE 1.8* 1.8* 1.5*   CALCIUM 8.2* 8.4* 8.3*   MG 2.4 2.1 1.8     CBC (Last 3 Results):   Recent Labs   Lab 09/28/20  0448 09/29/20  0355 09/30/20  0424   WBC 15.07* 14.53* 12.82*   RBC 2.62* 2.60* 2.47*   HGB 6.9* 6.8* 6.5*   HCT 23.7* 23.4* 22.1*   * 626* 611*   MCV 91 90 90   MCH 26.3* 26.2* 26.3*   MCHC 29.1* 29.1* 29.4*       Significant Diagnostics:  None    Assessment/Plan:     * Bilateral ureteral obstruction  Ms. Riley is a 55 y.o. female with b/l ureteral obstruction s/p transverse colon conduit on 9/11/2020. Extended R  colectomy and ileostomy creation on 9/17.     - Diet regular with low K  - d/c tpn  - ITA changed per ID  -  nephrosotrogram 9/28   - OOB, activity as tolerated  - PT/OT        Fungemia  Blood cult poss for yeast    -consult opth, appreciate input  -d/c PICC  -blood culture after PICC removed, NGTF  -ITA per ID, cefepine, flgayl and diflucan  -monitor culture results   -line holiday    Wound infection after surgery  Wound now open, local wound care, wound vac  ITA per ID    Hypokalemia  Patient has hypokalemia which is currently uncontrolled. Last electrolytes reviewed-   Recent Labs   Lab 09/29/20  0355 09/30/20  0424   K 5.3* 4.4   . Will replace potassium and monitor electrolytes closely.     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  At time of that procedure, I procured a vascularized segment of transverse colon to be used as the urinary conduit, as requested by Urology.  A primary end-to-end, hand-sewn colocolonic anastomosis was performed at that time to restore intestinal continuity once the segment of colon to be used for the conduit had been excluded.  The patient did well for the 1st few days postoperatively but then began to develop abdominal pain and distention consistent with an ileus.  This morning, there was small amount of pneumoperitoneum seen on a plain abdominal film, and later today she began to drain feculent appearing fluid through her Kurtis-Arias drain.  She had a normal white blood cell count and did not have peritonitis on examination, so a CT scan was performed, which demonstrated a large volume of free abdominal fluid as well as a large volume of pneumoperitoneum, concerning for an anastomotic  leak.  She is being brought back to the operating room for urgent exploratory laparotomy.    OR 9/17/2020    Consult wound care for ileosotmy  Naseem vaughan, scd  Enc amb and IS  casandra smal amt of diet  Cont ITA per ID recommendations    Moderate malnutrition  Consult dietary  TPN dced  Monitor labs  Appreciate dietary inp;ut    ODESSA (acute kidney injury)  Monitor I&O  Appreciate neph input  Change IVF to d5w with 3 amps of NaBicarb due to renal acidosis  Renal dose meds    Severe episode of recurrent major depressive disorder, with psychotic features  Cont home m eds    Impaired functional mobility, balance, gait, and endurance  PT/OT    Essential hypertension  Chronic, controlled.  Latest blood pressure and vitals reviewed-   Temp:  [98 °F (36.7 °C)-99.8 °F (37.7 °C)]   Pulse:  []   Resp:  [18-25]   BP: (112-126)/(58-76)   SpO2:  [97 %-100 %] .   Home meds for hypertension were reviewed and noted below. Hospital anti-hypertensive changes were made as shown below.  Hospital Medications             labetalol 20 mg/4 mL (5 mg/mL) IV syring 10 mg, Intravenous, Every 4 hours PRN, 1. Confirm patient on continuous cardiac monitor (obtain order if needed)<BR>2. Obstetrics is exempt from continuous cardiac monitoring. <BR>3. Monitor ECG rhythm throughout administrations noting rhythm and changes<BR>4. Monitor BP and HR pre-administration, post-administration, and every 30 minutes x1 after dose given<BR>5. Administer Labetalol at rate no faster than 10mg over 1 minute.<BR>6. Contraindications: HR &lt;50, SBP&lt;100, second or third degree AV block. If assessed, hold dose and call provider.        Will utilize p.r.n. blood pressure medication only if patient's blood pressure greater than  180/110 and she develops symptoms such as worsening chest pain or shortness of breath.          Kerri Castillo NP  Colorectal Surgery  [facility]

## 2020-09-30 NOTE — ASSESSMENT & PLAN NOTE
-S/p urinary diversion with colon conduit 9/11   -S/p emergent ex-lap 9/17, colo-colic anastomosis intact, bowel perforation found, underwent resection of right colon, remaining transverses colon, and proximal descending colon, ileostomy creation, Charlotte's pouch created

## 2020-09-30 NOTE — SUBJECTIVE & OBJECTIVE
Interval History:   Feels well this morning. Cr trending down. Bcx positive for candida, also now on fluconazole for candida retinitis   Nephrostomy tubes remain clamped, good UOP via conduit and she c/o no back/flank pain     Objective:     Temp:  [98.1 °F (36.7 °C)-99.8 °F (37.7 °C)] 98.8 °F (37.1 °C)  Pulse:  [] 104  Resp:  [14-25] 21  SpO2:  [95 %-100 %] 97 %  BP: (109-132)/(59-76) 112/71     Body mass index is 28.94 kg/m².           Drains     Drain                 Nephrostomy 08/13/20 0805 Left 12 Fr. 42 days         Nephrostomy 08/13/20 0809 Right 12 Fr. 42 days         Urostomy 09/11/20 ileal conduit LLQ 14 days         Ileostomy 09/18/20 RLQ 7 days         Nephrostomy 09/18/20 0025 Left 7 days         Nephrostomy 09/18/20 0025 Right 7 days         Closed/Suction Drain 09/23/20 0720 Right Abdomen Bulb 10 Fr. 1 day                Physical Exam   Constitutional: No distress.   HENT:   Head: Normocephalic and atraumatic.   Eyes: Conjunctivae are normal. No scleral icterus.   Neck: Normal range of motion.   Cardiovascular: Normal rate.    Pulmonary/Chest: Effort normal. No respiratory distress.   Abdominal: Soft. She exhibits no distension. There is abdominal tenderness (appropriate). There is no rebound and no guarding.   Urostomy p/p/p draining clear yellow urine  Nephrostomy tubes clamped   Ileostomy output thickened   IR drain LLQ draining light yellow clear fluid  Midline incision with wound vac and packing in place    Musculoskeletal: Normal range of motion.      Comments: SCDs in place   Neurological: She is alert.   Skin: Skin is warm and dry. She is not diaphoretic.         Significant Labs:    BMP:  Recent Labs   Lab 09/28/20  0448 09/29/20  0355 09/30/20  0424   * 136 138   K 4.9 5.3* 4.4   * 112* 106   CO2 17* 14* 22*   BUN 23* 25* 21*   CREATININE 1.8* 1.8* 1.5*   CALCIUM 8.2* 8.4* 8.3*       CBC:   Recent Labs   Lab 09/28/20  0448 09/29/20  0355 09/30/20  0424   WBC 15.07* 14.53*  12.82*   HGB 6.9* 6.8* 6.5*   HCT 23.7* 23.4* 22.1*   * 626* 611*       All pertinent labs results from the past 24 hours have been reviewed.    Significant Imaging:  All pertinent imaging results/findings from the past 24 hours have been reviewed.    Review of Systems

## 2020-09-30 NOTE — SUBJECTIVE & OBJECTIVE
Interval History: Afebrile and HD stable overnight. Feeling well this morning.    Review of Systems   Constitutional: Negative for chills and fever.   HENT: Negative for congestion and dental problem.    Eyes: Negative for visual disturbance.   Respiratory: Negative for cough and shortness of breath.    Cardiovascular: Negative for chest pain and leg swelling.   Gastrointestinal: Negative for abdominal pain, diarrhea and vomiting.   Genitourinary: Negative for difficulty urinating and dysuria.   Musculoskeletal: Negative for arthralgias and back pain.   Skin: Negative for color change and pallor.   Neurological: Negative for dizziness and headaches.   Psychiatric/Behavioral: Negative for behavioral problems and confusion.   All other systems reviewed and are negative.    Objective:     Vital Signs (Most Recent):  Temp: 98 °F (36.7 °C) (09/30/20 1100)  Pulse: 96 (09/30/20 1100)  Resp: 18 (09/30/20 1100)  BP: (!) 126/58 (09/30/20 1100)  SpO2: 98 % (09/30/20 1100) Vital Signs (24h Range):  Temp:  [98 °F (36.7 °C)-99.8 °F (37.7 °C)] 98 °F (36.7 °C)  Pulse:  [] 96  Resp:  [18-25] 18  SpO2:  [97 %-100 %] 98 %  BP: (112-126)/(58-76) 126/58     Weight: 88.9 kg (196 lb)  Body mass index is 28.94 kg/m².    Estimated Creatinine Clearance: 49.2 mL/min (A) (based on SCr of 1.5 mg/dL (H)).    Physical Exam  Vitals signs and nursing note reviewed.   Constitutional:       Appearance: Normal appearance.      Comments: Ill appearing   HENT:      Head: Normocephalic and atraumatic.      Nose: Nose normal.      Mouth/Throat:      Mouth: Mucous membranes are dry.   Eyes:      Extraocular Movements: Extraocular movements intact.      Pupils: Pupils are equal, round, and reactive to light.   Cardiovascular:      Rate and Rhythm: Normal rate and regular rhythm.   Pulmonary:      Effort: Pulmonary effort is normal.      Breath sounds: Normal breath sounds.   Abdominal:      General: Abdomen is flat.      Comments: Vertical incision,  nephrostomy tubes in places, RLQ IR drain with serous output, ostomy in place with brown fecal content   Musculoskeletal: Normal range of motion.         General: No swelling.   Skin:     General: Skin is warm and dry.   Neurological:      General: No focal deficit present.      Mental Status: She is alert and oriented to person, place, and time.   Psychiatric:         Mood and Affect: Mood normal.         Behavior: Behavior normal.         Significant Labs: All pertinent labs within the past 24 hours have been reviewed.    Significant Imaging: I have reviewed all pertinent imaging results/findings within the past 24 hours.

## 2020-09-30 NOTE — PLAN OF CARE
Problem: Occupational Therapy Goal  Goal: Occupational Therapy Goal  Description: Goals to be met by: 10/8/2020    Patient will increase functional independence with ADLs by performing:    LE Dressing with Supervision.  Grooming while standing with Supervision.   Toileting from toilet with Supervision for hygiene and clothing management.   Supine to sit with Supervision.  Toilet transfer to toilet with Supervision.    Outcome: Ongoing, Progressing     Goals reviewed and updated continue POC    PEREZ Quevedo  9/30/2020   Pager #: [phone]

## 2020-09-30 NOTE — ASSESSMENT & PLAN NOTE
55 year old female with a past medical history ureteral strictures s/p nephrostomy tubes and ureteral stents with developent of ODESSA in the presence of no hydronephrosis or obstruction. Baseline Cr is 0.8-1. Cr was normal up until 9/24. ODESSA happened on 9/25 with a rise in Cr to 1.7. Hemoglobin has been around upper 6's. ODESSA possibly 2/2 pre-renal cause due to blood loss and drop in hemoglobin resulting in hypoperfusion. Urine studies suggest prerenal ODESSA. BUN and Cr downtrending.    Plan:  Switch maintenance fluids to Plasma-lyte to match urine output to avoid further prerenal insult  Low K diet  Avoid nephrotoxic agents (NSAIDs, ACEi, ARB, contrast)  Strict I/Os  Daily standing weights  Renally dose medications

## 2020-09-30 NOTE — ASSESSMENT & PLAN NOTE
Chronic, controlled.  Latest blood pressure and vitals reviewed-   Temp:  [98 °F (36.7 °C)-99.8 °F (37.7 °C)]   Pulse:  []   Resp:  [18-25]   BP: (112-126)/(58-76)   SpO2:  [97 %-100 %] .   Home meds for hypertension were reviewed and noted below. Hospital anti-hypertensive changes were made as shown below.  Hospital Medications             labetalol 20 mg/4 mL (5 mg/mL) IV syring 10 mg, Intravenous, Every 4 hours PRN, 1. Confirm patient on continuous cardiac monitor (obtain order if needed)<BR>2. Obstetrics is exempt from continuous cardiac monitoring. <BR>3. Monitor ECG rhythm throughout administrations noting rhythm and changes<BR>4. Monitor BP and HR pre-administration, post-administration, and every 30 minutes x1 after dose given<BR>5. Administer Labetalol at rate no faster than 10mg over 1 minute.<BR>6. Contraindications: HR &lt;50, SBP&lt;100, second or third degree AV block. If assessed, hold dose and call provider.        Will utilize p.r.n. blood pressure medication only if patient's blood pressure greater than  180/110 and she develops symptoms such as worsening chest pain or shortness of breath.

## 2020-09-30 NOTE — PROGRESS NOTES
[facility]  Physical Medicine & Rehab  Progress Note    Patient Name: Edita Riley  MRN: [id number]  Admission Date: 9/11/2020  Length of Stay: 19 days  Attending Physician: Hammad Reynolds MD    Subjective:     Principal Problem:Bilateral ureteral obstruction    Hospital Course:   9/21: BM ModA-CGA. Sit to stand ModA x 2 ppl & RW. Ambulated 20 ft CGA & RW. UBD Jalil. LBD MaxA.   09/22/2020: Declined OT.   9/23: OT-Sit to stand CGA & RW. Ambulated 35', 50', 70' with CGA and RW, seated rest breaks between with chair follow for safety. [place] SV.   9/26: OT-Sit to stand CGA-SBA 7 RW. Trxs CGA & RW. Ambulated 15 ft + 20 ft + 35 ft + 35 ft CGA-SBA & RW.     Interval History 9/30/2020:  Patient is seen for follow-up PM&R evaluation and recommendations: Participating and progressing well with therapy. On Na bicab infusing.    HPI, Past Medical, Family, and Social History remains the same as documented in the initial encounter.    Scheduled Medications:    ceFEPime (MAXIPIME) IVPB  1 g Intravenous Q8H    cyanocobalamin  100 mcg Intramuscular Weekly    epoetin yecenia-epbx  20,000 Units Subcutaneous Q48H    fluconazole  400 mg Oral Daily    heparin (porcine)  5,000 Units Subcutaneous Q8H    loperamide  2 mg Oral BID    metoprolol tartrate  25 mg Oral BID    metroNIDAZOLE  500 mg Oral Q8H    pantoprazole  40 mg Intravenous Daily    psyllium husk (aspartame)  1 packet Oral Daily    sodium chloride 0.9%  10 mL Intravenous Q6H       Diagnostic Results: Labs: Reviewed    PRN Medications: acetaminophen, dextrose 50%, glucagon (human recombinant), HYDROmorphone, insulin aspart U-100, iohexol, iohexol, labetalol, ondansetron, oxyCODONE-acetaminophen, oxyCODONE-acetaminophen, sodium chloride 0.9%, Flushing PICC Protocol **AND** sodium chloride 0.9% **AND** sodium chloride 0.9%, sodium chloride 0.9%    Review of Systems   Constitutional: Positive for activity change.   Respiratory: Negative for  shortness of breath.    Cardiovascular: Negative for chest pain.   Genitourinary: Positive for difficulty urinating.   Musculoskeletal: Positive for gait problem.   Neurological: Positive for weakness.     Objective:     Vital Signs (Most Recent):  Temp: 98 °F (36.7 °C) (09/30/20 0700)  Pulse: 94 (09/30/20 0700)  Resp: 19 (09/30/20 0700)  BP: 125/65 (09/30/20 0700)  SpO2: 98 % (09/30/20 0700)    Vital Signs (24h Range):  Temp:  [98 °F (36.7 °C)-99.8 °F (37.7 °C)] 98 °F (36.7 °C)  Pulse:  [] 94  Resp:  [18-25] 19  SpO2:  [95 %-100 %] 98 %  BP: (109-125)/(59-76) 125/65     Physical Exam  Vitals signs reviewed.   Constitutional:       General: She is not in acute distress.     Appearance: She is well-developed.      Comments: Working w/ OT   HENT:      Head: Normocephalic and atraumatic.   Eyes:      General:         Right eye: No discharge.         Left eye: No discharge.   Neck:      Musculoskeletal: Neck supple.   Cardiovascular:      Rate and Rhythm: Normal rate.      Pulses: Normal pulses.   Pulmonary:      Effort: Pulmonary effort is normal. No respiratory distress.   Abdominal:      Palpations: Abdomen is soft.      Tenderness: There is no abdominal tenderness.      Comments: Ileostomy in place    Genitourinary:     Comments: Nephrostomy tubes in place  Wound vac in place  Urostomy in place  Musculoskeletal:         General: No deformity.   Skin:     General: Skin is warm and dry.   Neurological:      Mental Status: She is alert and oriented to person, place, and time.      Motor: Weakness present.      Comments: Follows commands    Psychiatric:         Behavior: Behavior normal.         Cognition and Memory: Cognition is not impaired.     Assessment/Plan:      * Bilateral ureteral obstruction  -S/p urinary diversion with colon conduit 9/11   -S/p emergent ex-lap 9/17, colo-colic anastomosis intact, bowel perforation found, underwent resection of right colon, remaining transverses colon, and proximal  descending colon, ileostomy creation, Charlotte's pouch created   -wound vac and drain in place    Fungemia  -ID following  -4-6 weeks of fluconazole for candida retinitis, with serial ophtho exams to ensure lesions have resolved prior to completion of therapy  -continue cefepime and flagyl IV and per ID, if patient continues to do well clinically, recommend repeating CT A/P in 2 weeks to re-evaluate for fluid collections/need for additional drainage    Moderate malnutrition  -TPN d/c'd    Impaired functional mobility, balance, gait, and endurance  See hospital course for functional status.      Recommendations  -  Encourage mobility, OOB in chair, and early ambulation as appropriate  -  PT/OT evaluate and treat  -  Pain management  -  DVT prophylaxis if appropriate   -  Monitor for and prevent skin breakdown and pressure ulcers  · Early mobility, repositioning/weight shifting every 20-30 minutes when sitting, turn patient every 2 hours, proper mattress/overlay and chair cushioning, pressure relief/heel protector boots    Reviewed case with Collaborative MD, Dr. Veloz.  Likely inpatient rehab once medically stable.       Rylee Knapp NP  Department of Physical Medicine & Rehab   [facility]

## 2020-09-30 NOTE — ASSESSMENT & PLAN NOTE
Patient has hypokalemia which is currently uncontrolled. Last electrolytes reviewed-   Recent Labs   Lab 09/29/20  0355 09/30/20  0424   K 5.3* 4.4   . Will replace potassium and monitor electrolytes closely.

## 2020-09-30 NOTE — SUBJECTIVE & OBJECTIVE
Interval History 9/30/2020:  Patient is seen for follow-up PM&R evaluation and recommendations: Participating and progressing well with therapy. On Na bicab infusing.    HPI, Past Medical, Family, and Social History remains the same as documented in the initial encounter.    Scheduled Medications:    ceFEPime (MAXIPIME) IVPB  1 g Intravenous Q8H    cyanocobalamin  100 mcg Intramuscular Weekly    epoetin yecenia-epbx  20,000 Units Subcutaneous Q48H    fluconazole  400 mg Oral Daily    heparin (porcine)  5,000 Units Subcutaneous Q8H    loperamide  2 mg Oral BID    metoprolol tartrate  25 mg Oral BID    metroNIDAZOLE  500 mg Oral Q8H    pantoprazole  40 mg Intravenous Daily    psyllium husk (aspartame)  1 packet Oral Daily    sodium chloride 0.9%  10 mL Intravenous Q6H       Diagnostic Results: Labs: Reviewed    PRN Medications: acetaminophen, dextrose 50%, glucagon (human recombinant), HYDROmorphone, insulin aspart U-100, iohexol, iohexol, labetalol, ondansetron, oxyCODONE-acetaminophen, oxyCODONE-acetaminophen, sodium chloride 0.9%, Flushing PICC Protocol **AND** sodium chloride 0.9% **AND** sodium chloride 0.9%, sodium chloride 0.9%    Review of Systems   Constitutional: Positive for activity change.   Respiratory: Negative for shortness of breath.    Cardiovascular: Negative for chest pain.   Genitourinary: Positive for difficulty urinating.   Musculoskeletal: Positive for gait problem.   Neurological: Positive for weakness.     Objective:     Vital Signs (Most Recent):  Temp: 98 °F (36.7 °C) (09/30/20 0700)  Pulse: 94 (09/30/20 0700)  Resp: 19 (09/30/20 0700)  BP: 125/65 (09/30/20 0700)  SpO2: 98 % (09/30/20 0700)    Vital Signs (24h Range):  Temp:  [98 °F (36.7 °C)-99.8 °F (37.7 °C)] 98 °F (36.7 °C)  Pulse:  [] 94  Resp:  [18-25] 19  SpO2:  [95 %-100 %] 98 %  BP: (109-125)/(59-76) 125/65     Physical Exam  Vitals signs reviewed.   Constitutional:       General: She is not in acute distress.      Appearance: She is well-developed.      Comments: Working w/ OT   HENT:      Head: Normocephalic and atraumatic.   Eyes:      General:         Right eye: No discharge.         Left eye: No discharge.   Neck:      Musculoskeletal: Neck supple.   Cardiovascular:      Rate and Rhythm: Normal rate.      Pulses: Normal pulses.   Pulmonary:      Effort: Pulmonary effort is normal. No respiratory distress.   Abdominal:      Palpations: Abdomen is soft.      Tenderness: There is no abdominal tenderness.      Comments: Ileostomy in place      Genitourinary:     Comments: Nephrostomy tubes in place  Musculoskeletal:         General: No deformity.   Skin:     General: Skin is warm and dry.   Neurological:      Mental Status: She is alert and oriented to person, place, and time.      Motor: Weakness present.      Comments: Follows commands    Psychiatric:         Behavior: Behavior normal.         Cognition and Memory: Cognition is not impaired.       NEUROLOGICAL EXAMINATION:     MENTAL STATUS   Oriented to person, place, and time.

## 2020-09-30 NOTE — ASSESSMENT & PLAN NOTE
S/p urinary diversion with colon conduit 9/11   S/p emergent ex-lap 9/17, colo-colic anastomosis intact, bowel perforation found, underwent resection of right colon, remaining transverses colon, and proximal descending colon, ileostomy creation, Charlotte's pouch created     - CRS primary  - regular diet per Primary  - keep nephrostomy tubes clamped as patient is having good UOP via conduit  - continue PT/OT, OOBTC as tolerated, encourage ambulation  - Wound care ostomy consulted for assistance with ostomy teaching   - patient is a [employer]; management per primary and Heme-Onc (on EPO, B12, and iron)  - agree with ID recommendations: PICC line DC'd 9/28, cefepime for Enterobacter and metronidazole for anaerobes  - ophthalmology consulted for candida retinitis - now on fluconazole   - Nephrology and ophtho consulted for fungemia, appreciate recs  - Drains: maintain, IR drain (Cr 0.7), NTs  - Prophylaxis: IS, SCDs, GI ppx

## 2020-09-30 NOTE — ASSESSMENT & PLAN NOTE
55 year old female with a past medical history ureteral strictures s/p nephrostomy tubes and ureteral stents with developent of ODESSA in the presence of no hydronephrosis or obstruction. Baseline Cr is 0.8-1. Cr was normal up until 9/24. ODESSA happened on 9/25 with a rise in Cr to 1.7. Hemoglobin has been around upper 6's. ODESSA possibly 2/2 pre-renal cause due to blood loss and drop in hemoglobin resulting in hypoperfusion. Urine studies suggest prerenal ODESSA. BUN and Cr downtrending.    Plan:  Switch maintenance fluids to Plasma-lyte at 100ml/hr to avoid further prerenal insult  Low K diet  Avoid nephrotoxic agents (NSAIDs, ACEi, ARB, contrast)  Strict I/Os  Daily standing weights  Renally dose medications

## 2020-09-30 NOTE — ASSESSMENT & PLAN NOTE
Hemoglobin has been stable around mid 6's to mid 7's.   Patient is declining blood transfusions for [employer] reasons.

## 2020-09-30 NOTE — ASSESSMENT & PLAN NOTE
Blood cult poss for yeast    -consult opth, appreciate input  -d/c PICC  -blood culture after PICC removed, NGTF  -ITA per ID, cefepine, flgayl and diflucan  -monitor culture results   -line holiday

## 2020-09-30 NOTE — PROGRESS NOTES
[facility]  Urology  Progress Note    Patient Name: Edita Riley  MRN: [id number]  Admission Date: 9/11/2020  Hospital Length of Stay: 19 days  Code Status: Full Code   Attending Provider: Hammad Reynolds MD   Primary Care Physician: Audrey Mustafa MD    Subjective:     HPI:  Edita Riley is a 55 y.o. female with a history of cervical cancer s/p pelvic radiation. She has since developed bilateral ureteral strictures and bilateral hydronephrosis R>L. She had a MAG3 scan confirmed presence of bilateral obstruction. She is s/p open transverse colon conduit urinary diversion on 9/11/2020.     Interval History:   Feels well this morning. Cr trending down. Bcx positive for candida, also now on fluconazole for candida retinitis   Nephrostomy tubes remain clamped, good UOP via conduit and she c/o no back/flank pain     Objective:     Temp:  [98.1 °F (36.7 °C)-99.8 °F (37.7 °C)] 98.8 °F (37.1 °C)  Pulse:  [] 104  Resp:  [14-25] 21  SpO2:  [95 %-100 %] 97 %  BP: (109-132)/(59-76) 112/71     Body mass index is 28.94 kg/m².           Drains     Drain                 Nephrostomy 08/13/20 0805 Left 12 Fr. 42 days         Nephrostomy 08/13/20 0809 Right 12 Fr. 42 days         Urostomy 09/11/20 ileal conduit LLQ 14 days         Ileostomy 09/18/20 RLQ 7 days         Nephrostomy 09/18/20 0025 Left 7 days         Nephrostomy 09/18/20 0025 Right 7 days         Closed/Suction Drain 09/23/20 0720 Right Abdomen Bulb 10 Fr. 1 day                Physical Exam   Constitutional: No distress.   HENT:   Head: Normocephalic and atraumatic.   Eyes: Conjunctivae are normal. No scleral icterus.   Neck: Normal range of motion.   Cardiovascular: Normal rate.    Pulmonary/Chest: Effort normal. No respiratory distress.   Abdominal: Soft. She exhibits no distension. There is abdominal tenderness (appropriate). There is no rebound and no guarding.   Urostomy p/p/p draining clear yellow urine  Nephrostomy  tubes clamped   Ileostomy output thickened   IR drain LLQ draining light yellow clear fluid  Midline incision with wound vac and packing in place    Musculoskeletal: Normal range of motion.      Comments: SCDs in place   Neurological: She is alert.   Skin: Skin is warm and dry. She is not diaphoretic.         Significant Labs:    BMP:  Recent Labs   Lab 09/28/20 0448 09/29/20 0355 09/30/20  0424   * 136 138   K 4.9 5.3* 4.4   * 112* 106   CO2 17* 14* 22*   BUN 23* 25* 21*   CREATININE 1.8* 1.8* 1.5*   CALCIUM 8.2* 8.4* 8.3*       CBC:   Recent Labs   Lab 09/28/20 0448 09/29/20 0355 09/30/20  0424   WBC 15.07* 14.53* 12.82*   HGB 6.9* 6.8* 6.5*   HCT 23.7* 23.4* 22.1*   * 626* 611*       All pertinent labs results from the past 24 hours have been reviewed.    Significant Imaging:  All pertinent imaging results/findings from the past 24 hours have been reviewed.    Review of Systems          Assessment/Plan:     * Bilateral ureteral obstruction  S/p urinary diversion with colon conduit 9/11   S/p emergent ex-lap 9/17, colo-colic anastomosis intact, bowel perforation found, underwent resection of right colon, remaining transverses colon, and proximal descending colon, ileostomy creation, Charlotte's pouch created     - CRS primary  - regular diet per Primary  - keep nephrostomy tubes clamped as patient is having good UOP via conduit  - continue PT/OT, OOBTC as tolerated, encourage ambulation  - Wound care ostomy consulted for assistance with ostomy teaching   - patient is a [employer]; management per primary and Heme-Onc (on EPO, B12, and iron)  - agree with ID recommendations: PICC line DC'd 9/28, cefepime for Enterobacter and metronidazole for anaerobes  - ophthalmology consulted for candida retinitis - now on fluconazole   - Nephrology and ophtho consulted for fungemia, appreciate recs  - Drains: maintain, IR drain (Cr 0.7), NTs  - Prophylaxis: IS, SCDs, GI ppx        VTE Risk Mitigation  (From admission, onward)         Ordered     heparin (porcine) injection 5,000 Units  Every 8 hours      09/29/20 0553     IP VTE HIGH RISK PATIENT  Once      09/11/20 2024     Place sequential compression device  Until discontinued      09/11/20 2024                Mana Wilks MD  Urology  [facility]

## 2020-09-30 NOTE — PLAN OF CARE
CM team continues to follow pt's discharge planning needs. Patient is currently not medically ready for discharge. Pt's discharge position is  For SNF placement. Pt currently has no accepting facilities, however continued referrals will be sent. CM team will continue to follow.    Kira Dawkins LMSW  Case Management   [facility]   Ext. [phone]

## 2020-09-30 NOTE — PROGRESS NOTES
Wound care follow up:  Met with colo-rectal team at bedside to evaluate the purulent drainage from midline incision near umbilicus.  Dr. Mclean removed staples and opened dehisced area. Discussed incorporating vac therapy to include this new open area and pt in agreement. After cleansing each wound, applied wound vac accordingly with white foam to base of each 3 wounds and black foam on top, maintaining seal at 125mm/Hg continuous setting. Changed ileostomy and urostomy pouches and notified nursing.    Wound assessment:  1. Upper midline abdominal wound- full thickness, mostly granular but unable to visualize base into depth  8x4x3.5cm Undermining 10-12 o'clock 3cm. Moderate serosanguineous drainage. No odor.    2. Middle abdominal wound- full thickness, granular 3.5x1.3x2cm with 4cm tunelling at 12 o'clock. No odor. Moderate serosanguineous drainage.     3. Lower midline abdominal wound- full thickness, mostly granular but unable to visualize base into depth  5x3x3.5cm Tunneling@ 12 and 6 o'clock 1cm. No odor. Moderate serosanguineous drainage.

## 2020-09-30 NOTE — ASSESSMENT & PLAN NOTE
-ID following  -4-6 weeks of fluconazole for candida retinitis, with serial ophtho exams to ensure lesions have resolved prior to completion of therapy  -continue cefepime and flagyl IV and per ID, if patient continues to do well clinically, recommend repeating CT A/P in 2 weeks to re-evaluate for fluid collections/need for additional drainage

## 2020-09-30 NOTE — PROGRESS NOTES
[facility]  Infectious Disease  Progress Note    Patient Name: Edita Riley  MRN: [id number]  Admission Date: 9/11/2020  Length of Stay: 19 days  Attending Physician: Hammad Reynolds MD  Primary Care Provider: Audrey Mustafa MD    Isolation Status: No active isolations  Assessment/Plan:      Fungemia  Edita Riley is a 55 y.o. female with a history of cervical cancer s/p pelvic radiation, admitted since 09/11 s/p open transverse colon conduit urinary diversion on 9/11 c/b colocolonic anastomotic leak, ttaken back to OR on 9/17 and found with focal area of perforation 5cm distal to colo-colic anastomosis. 09/22 CT A/P repeated due to rise in wbc and showed a moderate volume of fluid and air within the abdomen, s/p IR RLQ drain placement 09/23. Febrile since 09/24. On Zosyn since 09/17 and fluconazole added 09/26 for bld clx growing yeast 09/25. Switched to dory 9/27.    Micro:  Abdominal fluid 09/17: Enterobacter cloacae + E.hormaechei + Bacteroides + solobacterium + Klebsiella oxytoca.    BCx 09/25: 1/4 candida lusitaniae; susceptibilities requested (9/29)  BCx 09/29: NGTD    --S/p antegrade nephrostograms in cysto + bilateral stent removal and bilateral neph tube exchange 9/28   --PICC line removed  --Appreciate ophtha eval. 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   --TTE no vegetations    Recommendations:    · Continue fluconazole for fungemia and candida retinitis for 4-6 weeks. Needs close follow-up with ophthalmology on discharge.  · Continue cefepime and flagyl for intraabdominal infection. Duration of tx to be determined pending resolution of intraabdominal fluid collection.  · Recommend repeat imaging of abdomen in approximately 2 weeks or sooner if febrile or if worsening clinically.  · F/U susceptibilities for candida lusitaniea requested on 9/29  · Will arrange f/u in ID clinic in 2-3 weeks.    Will sign off. Please call ID with any questions.         Anticipated  Disposition: tbd    Thank you for your consult. I will sign off. Please contact us if you have any additional questions.    Michelle Mohamud MD  Infectious Disease  [facility]    Subjective:     Principal Problem:Bilateral ureteral obstruction    HPI: Edita Riley is a 55 y.o. female with a history of cervical cancer s/p pelvic radiation, admitted since 09/11 s/p open transverse colon conduit urinary diversion on 9/11 c/b feculent drainage via abdominal drain on 9/17 CT scan showed large volume pneumoperitoneum and free fluid concerning for colocolonic anastomotic leak. Pt taken back to OR on 9/17.  Focal area of perforation 5cm distal to colo-colic anastomosis was found.  Right colon, remaining transverse, and proximal descending colon resected,End-ileostomy matured, Long sánchez's pouch left. 09/22 CT A/P repeated due to raising wbc which showed a moderate volume of fluid and air within the abdomen, IR placed placed RLQ drain 09/23 started spiking fever 09/24- she has been on Zosyn since 09/17 and fluconazole added 09/26 for bld clx growing yeast 09/25. ID consulted for fungemia.  Interval History: Afebrile and HD stable overnight. Feeling well this morning.    Review of Systems   Constitutional: Negative for chills and fever.   HENT: Negative for congestion and dental problem.    Eyes: Negative for visual disturbance.   Respiratory: Negative for cough and shortness of breath.    Cardiovascular: Negative for chest pain and leg swelling.   Gastrointestinal: Negative for abdominal pain, diarrhea and vomiting.   Genitourinary: Negative for difficulty urinating and dysuria.   Musculoskeletal: Negative for arthralgias and back pain.   Skin: Negative for color change and pallor.   Neurological: Negative for dizziness and headaches.   Psychiatric/Behavioral: Negative for behavioral problems and confusion.   All other systems reviewed and are negative.    Objective:     Vital  Signs (Most Recent):  Temp: 98 °F (36.7 °C) (09/30/20 1100)  Pulse: 96 (09/30/20 1100)  Resp: 18 (09/30/20 1100)  BP: (!) 126/58 (09/30/20 1100)  SpO2: 98 % (09/30/20 1100) Vital Signs (24h Range):  Temp:  [98 °F (36.7 °C)-99.8 °F (37.7 °C)] 98 °F (36.7 °C)  Pulse:  [] 96  Resp:  [18-25] 18  SpO2:  [97 %-100 %] 98 %  BP: (112-126)/(58-76) 126/58     Weight: 88.9 kg (196 lb)  Body mass index is 28.94 kg/m².    Estimated Creatinine Clearance: 49.2 mL/min (A) (based on SCr of 1.5 mg/dL (H)).    Physical Exam  Vitals signs and nursing note reviewed.   Constitutional:       Appearance: Normal appearance.      Comments: Ill appearing   HENT:      Head: Normocephalic and atraumatic.      Nose: Nose normal.      Mouth/Throat:      Mouth: Mucous membranes are dry.   Eyes:      Extraocular Movements: Extraocular movements intact.      Pupils: Pupils are equal, round, and reactive to light.   Cardiovascular:      Rate and Rhythm: Normal rate and regular rhythm.   Pulmonary:      Effort: Pulmonary effort is normal.      Breath sounds: Normal breath sounds.   Abdominal:      General: Abdomen is flat.      Comments: Vertical incision, nephrostomy tubes in places, RLQ IR drain with serous output, ostomy in place with brown fecal content   Musculoskeletal: Normal range of motion.         General: No swelling.   Skin:     General: Skin is warm and dry.   Neurological:      General: No focal deficit present.      Mental Status: She is alert and oriented to person, place, and time.   Psychiatric:         Mood and Affect: Mood normal.         Behavior: Behavior normal.         Significant Labs: All pertinent labs within the past 24 hours have been reviewed.    Significant Imaging: I have reviewed all pertinent imaging results/findings within the past 24 hours.

## 2020-09-30 NOTE — PROGRESS NOTES
[facility]  Nephrology  Progress Note    Patient Name: Edita Riley  MRN: [id number]  Admission Date: 9/11/2020  Hospital Length of Stay: 19 days  Attending Provider: Hammad Reynolds MD   Primary Care Physician: Audrey Mustafa MD  Principal Problem:Bilateral ureteral obstruction    Subjective:     HPI: Mrs. Riley is a 55 year old female with a history of cervical cancer s/p radiation resulting in ureteral strictures for which she underwent b/l nephrostomy tube placement. On 9/11 she underwent a creation of a transverse colon urinary conduit to LLQ. On 9/14 she with placement of bilateral ureteral stents. Her hospital stay was complicated by a post-operative bowel perforation now with a RLQ end ileostomy. IR placed a RLQ drain on 9/23 into an air fluid collection concerning for abscess in the presence of persistent leukocytosis. On 9/28, Urology removed the ureteral stents and clamped both nephrostomy tubes, with the expectation that ureteral peristalsis improvement.     Nephrology was consulted for ODESSA in the presence of no hydronephrosis or obstruction. Baseline Cr is around 0.8-1. Cr was normal up until 9/24. ODESSA happened on 9/25 with a rise in Cr to 1.7.    Interval History: Patient feels okay today. Her appetite is good. sitting in chair this morning eating breakfast. Denies chest pain, shortness of breath, nausea, vomiting, abdominal pain.    Review of patient's allergies indicates:   Allergen Reactions    Bee sting [allergen ext-venom-honey bee]      Rash      Grass pollen-bermuda, standard      rash     Current Facility-Administered Medications   Medication Frequency    acetaminophen tablet 650 mg Q6H PRN    ceFEPIme injection 1 g Q8H    cyanocobalamin injection 100 mcg Weekly    dextrose 50% injection 12.5 g PRN    epoetin yecenia-epbx injection 20,000 Units Q48H    fluconazole tablet 400 mg Daily    glucagon (human recombinant) injection 1 mg PRN    heparin  (porcine) injection 5,000 Units Q8H    HYDROmorphone injection 0.5 mg Q6H PRN    insulin aspart U-100 pen 1-10 Units Q4H PRN    iohexol (OMNIPAQUE) oral solution 15 mL PRN    iohexol (OMNIPAQUE) oral solution 15 mL PRN    labetalol 20 mg/4 mL (5 mg/mL) IV syring Q4H PRN    loperamide capsule 2 mg BID    metoprolol tartrate (LOPRESSOR) tablet 25 mg BID    metroNIDAZOLE tablet 500 mg Q8H    ondansetron injection 4 mg Q6H PRN    oxyCODONE-acetaminophen  mg per tablet 1 tablet Q4H PRN    oxyCODONE-acetaminophen 5-325 mg per tablet 1 tablet Q4H PRN    pantoprazole injection 40 mg Daily    psyllium husk (aspartame) 3.4 gram packet 1 packet Daily    sodium bicarbonate 150 mEq in dextrose 5 % 1,000 mL infusion Continuous    sodium chloride 0.9% flush 10 mL PRN    sodium chloride 0.9% flush 10 mL Q6H    And    sodium chloride 0.9% flush 10 mL PRN    sodium chloride 0.9% flush 3 mL PRN       Objective:     Vital Signs (Most Recent):  Temp: 98 °F (36.7 °C) (09/30/20 0700)  Pulse: 94 (09/30/20 0700)  Resp: 19 (09/30/20 0700)  BP: 125/65 (09/30/20 0700)  SpO2: 98 % (09/30/20 0700)  O2 Device (Oxygen Therapy): room air (09/29/20 0124) Vital Signs (24h Range):  Temp:  [98 °F (36.7 °C)-99.8 °F (37.7 °C)] 98 °F (36.7 °C)  Pulse:  [] 94  Resp:  [18-25] 19  SpO2:  [95 %-100 %] 98 %  BP: (109-125)/(59-76) 125/65     Weight: 88.9 kg (196 lb) (09/29/20 1545)  Body mass index is 28.94 kg/m².  Body surface area is 2.08 meters squared.    I/O last 3 completed shifts:  In: 4615 [P.O.:1560; I.V.:3055]  Out: 3800 [Urine:2950; Other:150; Stool:700]    Physical Exam  Constitutional:       General: She is not in acute distress.  HENT:      Head: Normocephalic and atraumatic.      Mouth/Throat:      Mouth: Mucous membranes are moist.      Pharynx: No oropharyngeal exudate.   Eyes:      General:         Right eye: No discharge.         Left eye: No discharge.      Extraocular Movements: Extraocular movements intact.    Cardiovascular:      Rate and Rhythm: Regular rhythm. Tachycardia present.      Heart sounds: No murmur.   Pulmonary:      Effort: Pulmonary effort is normal. No respiratory distress.      Breath sounds: Normal breath sounds.   Abdominal:      General: There is no distension.      Tenderness: There is no guarding.   Skin:     General: Skin is warm and dry.   Neurological:      Mental Status: She is alert and oriented to person, place, and time.   Psychiatric:         Mood and Affect: Mood normal.         Behavior: Behavior normal.         Significant Labs:  CBC:   Recent Labs   Lab 09/30/20  0424   WBC 12.82*   RBC 2.47*   HGB 6.5*   HCT 22.1*   *   MCV 90   MCH 26.3*   MCHC 29.4*     CMP:   Recent Labs   Lab 09/30/20  0424   *   CALCIUM 8.3*   ALBUMIN 1.8*   PROT 6.6      K 4.4   CO2 22*      BUN 21*   CREATININE 1.5*   ALKPHOS 106   ALT 10   AST 15   BILITOT 0.3     Recent Labs   Lab 09/28/20  1729   COLORU Yellow   SPECGRAV 1.025   PHUR 6.0   PROTEINUA 1+*   BACTERIA Moderate*   NITRITE Negative   LEUKOCYTESUR 2+*   HYALINECASTS 0     All labs within the past 24 hours have been reviewed.     Significant Imaging:    IR Nephrostomy tube change 9/28:  Impression:     Bilateral nephrostomy tube exchange as described above.     Plan:     With no surgical intervention is planned, the patient should have nephrostomy tubes exchange in 3 to 6 months.       Assessment/Plan:     ODESSA (acute kidney injury)  55 year old female with a past medical history ureteral strictures s/p nephrostomy tubes and ureteral stents with developent of ODESSA in the presence of no hydronephrosis or obstruction. Baseline Cr is 0.8-1. Cr was normal up until 9/24. ODESSA happened on 9/25 with a rise in Cr to 1.7. Hemoglobin has been around upper 6's. ODESSA possibly 2/2 pre-renal cause due to blood loss and drop in hemoglobin resulting in hypoperfusion. Urine studies suggest prerenal ODESSA. BUN and Cr downtrending.    Plan:  Switch  maintenance fluids to Plasma-lyte at 100ml/hr to avoid further prerenal insult  Low K diet  Avoid nephrotoxic agents (NSAIDs, ACEi, ARB, contrast)  Strict I/Os  Daily standing weights  Renally dose medications        Acute blood loss anemia  Hemoglobin has been stable around mid 6's to mid 7's.   Patient is declining blood transfusions for [employer] reasons.      Thank you for your consult. I will follow-up with patient. Please contact us if you have any additional questions.    Kris Tai MD PGY1  Nephrology  [facility]

## 2020-09-30 NOTE — PT/OT/SLP PROGRESS
Occupational Therapy   Treatment    Name: Edita Riley  MRN: [id number]  Admitting Diagnosis:  Bilateral ureteral obstruction  2 Days Post-Op    Recommendations:     Discharge Recommendations: nursing facility, skilled  Discharge Equipment Recommendations:  bath bench, wheelchair, walker, rolling  Barriers to discharge:  (requires increased level of assistance)    Assessment:     Edita Riley is a 55 y.o. female with a medical diagnosis of Bilateral ureteral obstruction.  She presents seated in bedside chair willing to participate in OT session. Pt eager to perform ADLs standing at bathroom sink. However, pt's light in bathroom was burnt out so ADLs completed in standing at bedside table. Unit [place] and charge nurse notified and placed work order.  Performance deficits affecting function are weakness, impaired endurance, impaired self care skills, impaired functional mobilty, gait instability, impaired balance, decreased lower extremity function, impaired cardiopulmonary response to activity. Pt would benefit from skilled OT services in order to maximize independence with ADLs and facilitate safe discharge. Pt would benefit from SNF upon discharge to return to [facility] and decrease burden of care.     Rehab Prognosis:  Good; patient would benefit from acute skilled OT services to address these deficits and reach maximum level of function.       Plan:     Patient to be seen 4 x/week to address the above listed problems via self-care/home management, therapeutic activities, therapeutic exercises  · Plan of Care Expires: 10/11/20  · Plan of Care Reviewed with: patient    Subjective     Pain/Comfort:  · Pain Rating 1: 0/10    Objective:     Communicated with: RN prior to session.  Patient found up in chair with telemetry, colostomy, peripheral IV, nephrostomy, ORESTES drain, wound vac upon OT entry to room.    General Precautions: Standard, fall   Orthopedic Precautions:N/A   Braces: N/A      Occupational Performance:     Bed Mobility:    · Not assessed pt found and returned to bedside chair    Functional Mobility/Transfers:  · Patient completed Sit <> Stand Transfer with stand by assistance  with  no assistive device  x2 trials from bedside chair    Activities of Daily Living:  · Feeding:  independence to grasp cup, bring cup to mouth, and drink water with RUE seated in bedside chair  · Grooming: stand by assistance standing balance to perform oral care at bedside table  · Bathing: minimum assistance to bathe seated in bedside chair using bath wipes   · UE Dressing: moderate assistance to doff gown/don fresh gown seated in bedside chair 2* multiple lines      [facility] 6 Click ADL: 18    Treatment & Education:  -Therapist provided facilitation and instruction of proper body mechanics, energy conservation, and fall prevention strategies during tasks listed above.  -Pt educated on role of OT, POC and goals for therapy  -Time provided for therapeutic listening and discussion of discharge recommendations  -staff notified of pt's bathroom light not working, unit secretary stated she would place a work order  -Pt educated on importance of OOB activities with staff member assistance and sitting OOB majority of the day.   -Pt verbalized understanding. Pt expressed no further concerns/questions  -Whiteboard updated    Patient left up in chair with all lines intact and call button in reachEducation:      GOALS:   Multidisciplinary Problems     Occupational Therapy Goals        Problem: Occupational Therapy Goal    Goal Priority Disciplines Outcome Interventions   Occupational Therapy Goal     OT, PT/OT Ongoing, Progressing    Description: Goals to be met by: 10/8/2020    Patient will increase functional independence with ADLs by performing:    LE Dressing with Supervision.  Grooming while standing with Supervision.   Toileting from toilet with Supervision for hygiene and clothing management.   Supine to sit with  Supervision.  Toilet transfer to toilet with Supervision.                     Time Tracking:     OT Date of Treatment: 09/30/20  OT Start Time: 0939  OT Stop Time: 1006  OT Total Time (min): 27 min    Billable Minutes:Self Care/Home Management 17  Therapeutic Activity 10    Stephanie Sales OT  9/30/2020

## 2020-09-30 NOTE — ASSESSMENT & PLAN NOTE
[facility] 55 [facility] female with a history of cervical cancer s/p pelvic radiation, admitted since 09/11 s/p open transverse colon conduit urinary diversion on 9/11 c/b colocolonic anastomotic leak, ttaken back to OR on 9/17 and found with focal area of perforation 5cm distal to colo-colic anastomosis. 09/22 CT A/P repeated due to rise in wbc and showed a moderate volume of fluid and air within the abdomen, s/p IR RLQ drain placement 09/23. Febrile since 09/24. On Zosyn since 09/17 and fluconazole added 09/26 for bld clx growing yeast 09/25. Switched to dory 9/27.    Micro:  Abdominal fluid 09/17: Enterobacter cloacae + E.hormaechei + Bacteroides + solobacterium + Klebsiella oxytoca.    BCx 09/25: 1/4 candida lusitaniae; susceptibilities requested (9/29)  BCx 09/29: NGTD    --S/p antegrade nephrostograms in cysto + bilateral stent removal and bilateral neph tube exchange 9/28   --PICC line removed  --Appreciate ophtha eval. 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   --TTE no vegetations    Recommendations:    · Continue fluconazole for fungemia and candida retinitis for 4-6 weeks. Needs close follow-up with ophthalmology on discharge.  · Continue cefepime and flagyl for intraabdominal infection. Duration of tx to be determined pending resolution of intraabdominal fluid collection.  · Recommend repeat imaging of abdomen in approximately 2 weeks or sooner if febrile or if worsening clinically.  · F/U susceptibilities for candida lusitaniea requested on 9/29  · Will arrange f/u in ID clinic in 2-3 weeks.    Will sign off. Please call ID with any questions.

## 2020-09-30 NOTE — SUBJECTIVE & OBJECTIVE
Interval History: Patient feels okay today. Her appetite is good. sitting in chair this morning eating breakfast. Denies chest pain, shortness of breath, nausea, vomiting, abdominal pain.    Review of patient's allergies indicates:   Allergen Reactions    Bee sting [allergen ext-venom-honey bee]      Rash      Grass pollen-bermuda, standard      rash     Current Facility-Administered Medications   Medication Frequency    acetaminophen tablet 650 mg Q6H PRN    ceFEPIme injection 1 g Q8H    cyanocobalamin injection 100 mcg Weekly    dextrose 50% injection 12.5 g PRN    epoetin yecenia-epbx injection 20,000 Units Q48H    fluconazole tablet 400 mg Daily    glucagon (human recombinant) injection 1 mg PRN    heparin (porcine) injection 5,000 Units Q8H    HYDROmorphone injection 0.5 mg Q6H PRN    insulin aspart U-100 pen 1-10 Units Q4H PRN    iohexol (OMNIPAQUE) oral solution 15 mL PRN    iohexol (OMNIPAQUE) oral solution 15 mL PRN    labetalol 20 mg/4 mL (5 mg/mL) IV syring Q4H PRN    loperamide capsule 2 mg BID    metoprolol tartrate (LOPRESSOR) tablet 25 mg BID    metroNIDAZOLE tablet 500 mg Q8H    ondansetron injection 4 mg Q6H PRN    oxyCODONE-acetaminophen  mg per tablet 1 tablet Q4H PRN    oxyCODONE-acetaminophen 5-325 mg per tablet 1 tablet Q4H PRN    pantoprazole injection 40 mg Daily    psyllium husk (aspartame) 3.4 gram packet 1 packet Daily    sodium bicarbonate 150 mEq in dextrose 5 % 1,000 mL infusion Continuous    sodium chloride 0.9% flush 10 mL PRN    sodium chloride 0.9% flush 10 mL Q6H    And    sodium chloride 0.9% flush 10 mL PRN    sodium chloride 0.9% flush 3 mL PRN       Objective:     Vital Signs (Most Recent):  Temp: 98 °F (36.7 °C) (09/30/20 0700)  Pulse: 94 (09/30/20 0700)  Resp: 19 (09/30/20 0700)  BP: 125/65 (09/30/20 0700)  SpO2: 98 % (09/30/20 0700)  O2 Device (Oxygen Therapy): room air (09/29/20 0124) Vital Signs (24h Range):  Temp:  [98 °F (36.7 °C)-99.8 °F  (37.7 °C)] 98 °F (36.7 °C)  Pulse:  [] 94  Resp:  [18-25] 19  SpO2:  [95 %-100 %] 98 %  BP: (109-125)/(59-76) 125/65     Weight: 88.9 kg (196 lb) (09/29/20 1545)  Body mass index is 28.94 kg/m².  Body surface area is 2.08 meters squared.    I/O last 3 completed shifts:  In: 4615 [P.O.:1560; I.V.:3055]  Out: 3800 [Urine:2950; Other:150; Stool:700]    Physical Exam  Constitutional:       General: She is not in acute distress.  HENT:      Head: Normocephalic and atraumatic.      Mouth/Throat:      Mouth: Mucous membranes are moist.      Pharynx: No oropharyngeal exudate.   Eyes:      General:         Right eye: No discharge.         Left eye: No discharge.      Extraocular Movements: Extraocular movements intact.   Cardiovascular:      Rate and Rhythm: Regular rhythm. Tachycardia present.      Heart sounds: No murmur.   Pulmonary:      Effort: Pulmonary effort is normal. No respiratory distress.      Breath sounds: Normal breath sounds.   Abdominal:      General: There is no distension.      Tenderness: There is no guarding.   Skin:     General: Skin is warm and dry.   Neurological:      Mental Status: She is alert and oriented to person, place, and time.   Psychiatric:         Mood and Affect: Mood normal.         Behavior: Behavior normal.         Significant Labs:  CBC:   Recent Labs   Lab 09/30/20  0424   WBC 12.82*   RBC 2.47*   HGB 6.5*   HCT 22.1*   *   MCV 90   MCH 26.3*   MCHC 29.4*     CMP:   Recent Labs   Lab 09/30/20  0424   *   CALCIUM 8.3*   ALBUMIN 1.8*   PROT 6.6      K 4.4   CO2 22*      BUN 21*   CREATININE 1.5*   ALKPHOS 106   ALT 10   AST 15   BILITOT 0.3     Recent Labs   Lab 09/28/20  1729   COLORU Yellow   SPECGRAV 1.025   PHUR 6.0   PROTEINUA 1+*   BACTERIA Moderate*   NITRITE Negative   LEUKOCYTESUR 2+*   HYALINECASTS 0     All labs within the past 24 hours have been reviewed.     Significant Imaging:    IR Nephrostomy tube change 9/28:  Impression:     Bilateral  nephrostomy tube exchange as described above.     Plan:     With no surgical intervention is planned, the patient should have nephrostomy tubes exchange in 3 to 6 months.

## 2020-09-30 NOTE — PLAN OF CARE
Pt AAO x 4. Pt progressing and involved in plan of care. Q2h rounding done. No acute changes throughtout shift . Will continue to monitor.

## 2020-09-30 NOTE — SUBJECTIVE & OBJECTIVE
Subjective:     Interval History: no acute events overnite, no n/v, eating well, ostomy functional    Post-Op Info:  Procedure(s) (LRB):  Nephrostogram - antegrade (Bilateral)  REMOVAL, STENT, URETER (Bilateral)   2 Days Post-Op      Medications:  Continuous Infusions:   sodium bicarbonate drip 100 mL/hr at 09/30/20 1436     Scheduled Meds:   ceFEPime (MAXIPIME) IVPB  1 g Intravenous Q8H    cyanocobalamin  100 mcg Intramuscular Weekly    epoetin yecenia-epbx  20,000 Units Subcutaneous Q48H    fluconazole  400 mg Oral Daily    heparin (porcine)  5,000 Units Subcutaneous Q8H    loperamide  2 mg Oral BID    metoprolol tartrate  25 mg Oral BID    metroNIDAZOLE  500 mg Oral Q8H    pantoprazole  40 mg Intravenous Daily    psyllium husk (aspartame)  1 packet Oral Daily    sodium chloride 0.9%  10 mL Intravenous Q6H     PRN Meds:   acetaminophen    dextrose 50%    glucagon (human recombinant)    HYDROmorphone    insulin aspart U-100    iohexol    iohexol    labetalol    ondansetron    oxyCODONE-acetaminophen    oxyCODONE-acetaminophen    sodium chloride 0.9%    sodium chloride 0.9%    sodium chloride 0.9%        Objective:     Vital Signs (Most Recent):  Temp: 98 °F (36.7 °C) (09/30/20 1100)  Pulse: 96 (09/30/20 1100)  Resp: 18 (09/30/20 1100)  BP: (!) 126/58 (09/30/20 1100)  SpO2: 98 % (09/30/20 1100) Vital Signs (24h Range):  Temp:  [98 °F (36.7 °C)-99.8 °F (37.7 °C)] 98 °F (36.7 °C)  Pulse:  [] 96  Resp:  [18-25] 18  SpO2:  [97 %-100 %] 98 %  BP: (112-126)/(58-76) 126/58     Intake/Output - Last 3 Shifts       09/28 0700 - 09/29 0659 09/29 0700 - 09/30 0659 09/30 0700 - 10/01 0659    P.O. 360 1200     I.V. (mL/kg) 1200 (13.5) 1855 (20.9)     Total Intake(mL/kg) 1560 (17.5) 3055 (34.4)     Urine (mL/kg/hr) 1250 (0.6) 2000 (0.9) 700 (1)    Drains 0 0 5    Other 200 0 0    Stool 300 500 200    Total Output 1750 2500 905    Net -190 +555 -905           Stool Occurrence  0 x           Physical  Exam  Vitals signs and nursing note reviewed.   Constitutional:       Appearance: She is well-developed. She is not ill-appearing.   HENT:      Head: Normocephalic.   Eyes:      Pupils: Pupils are equal, round, and reactive to light.   Cardiovascular:      Rate and Rhythm: Normal rate and regular rhythm.      Heart sounds: Normal heart sounds.   Pulmonary:      Effort: Pulmonary effort is normal. No respiratory distress.      Breath sounds: Normal breath sounds. No wheezing or rales.   Abdominal:      Palpations: Abdomen is soft. There is no mass.      Tenderness: There is no guarding or rebound.      Comments: Urostomy and ileosotmy functikonal  Wound vac intact   Skin:     General: Skin is warm and dry.   Neurological:      Mental Status: She is alert and oriented to person, place, and time.   Psychiatric:         Behavior: Behavior normal.         Thought Content: Thought content normal.         Judgment: Judgment normal.           Significant Labs:  BMP (Last 3 Results):   Recent Labs   Lab 09/28/20 0448 09/29/20 0355 09/30/20  0424   * 90 121*   * 136 138   K 4.9 5.3* 4.4   * 112* 106   CO2 17* 14* 22*   BUN 23* 25* 21*   CREATININE 1.8* 1.8* 1.5*   CALCIUM 8.2* 8.4* 8.3*   MG 2.4 2.1 1.8     CBC (Last 3 Results):   Recent Labs   Lab 09/28/20 0448 09/29/20  0355 09/30/20  0424   WBC 15.07* 14.53* 12.82*   RBC 2.62* 2.60* 2.47*   HGB 6.9* 6.8* 6.5*   HCT 23.7* 23.4* 22.1*   * 626* 611*   MCV 91 90 90   MCH 26.3* 26.2* 26.3*   MCHC 29.1* 29.1* 29.4*       Significant Diagnostics:  None

## 2020-10-01 LAB
ALBUMIN SERPL BCP-MCNC: 1.8 G/DL (ref 3.5–5.2)
ALP SERPL-CCNC: 107 U/L (ref 55–135)
ALT SERPL W/O P-5'-P-CCNC: 9 U/L (ref 10–44)
ANION GAP SERPL CALC-SCNC: 12 MMOL/L (ref 8–16)
AST SERPL-CCNC: 18 U/L (ref 10–40)
BACTERIA SPEC AEROBE CULT: ABNORMAL
BASOPHILS # BLD AUTO: 0.04 K/UL (ref 0–0.2)
BASOPHILS NFR BLD: 0.3 % (ref 0–1.9)
BILIRUB SERPL-MCNC: 0.3 MG/DL (ref 0.1–1)
BUN SERPL-MCNC: 17 MG/DL (ref 6–20)
CALCIUM SERPL-MCNC: 8.3 MG/DL (ref 8.7–10.5)
CHLORIDE SERPL-SCNC: 101 MMOL/L (ref 95–110)
CO2 SERPL-SCNC: 28 MMOL/L (ref 23–29)
CREAT SERPL-MCNC: 1.2 MG/DL (ref 0.5–1.4)
DIFFERENTIAL METHOD: ABNORMAL
EOSINOPHIL # BLD AUTO: 0.4 K/UL (ref 0–0.5)
EOSINOPHIL NFR BLD: 2.7 % (ref 0–8)
ERYTHROCYTE [DISTWIDTH] IN BLOOD BY AUTOMATED COUNT: 18.7 % (ref 11.5–14.5)
EST. GFR  (AFRICAN AMERICAN): 58 ML/MIN/1.73 M^2
EST. GFR  (NON AFRICAN AMERICAN): 50.3 ML/MIN/1.73 M^2
GLUCOSE SERPL-MCNC: 122 MG/DL (ref 70–110)
HCT VFR BLD AUTO: 22.1 % (ref 37–48.5)
HGB BLD-MCNC: 6.6 G/DL (ref 12–16)
IMM GRANULOCYTES # BLD AUTO: 0.12 K/UL (ref 0–0.04)
IMM GRANULOCYTES NFR BLD AUTO: 0.9 % (ref 0–0.5)
LYMPHOCYTES # BLD AUTO: 1.4 K/UL (ref 1–4.8)
LYMPHOCYTES NFR BLD: 10.9 % (ref 18–48)
MAGNESIUM SERPL-MCNC: 1.6 MG/DL (ref 1.6–2.6)
MCH RBC QN AUTO: 26.5 PG (ref 27–31)
MCHC RBC AUTO-ENTMCNC: 29.9 G/DL (ref 32–36)
MCV RBC AUTO: 89 FL (ref 82–98)
MONOCYTES # BLD AUTO: 1.5 K/UL (ref 0.3–1)
MONOCYTES NFR BLD: 11.3 % (ref 4–15)
NEUTROPHILS # BLD AUTO: 9.5 K/UL (ref 1.8–7.7)
NEUTROPHILS NFR BLD: 73.9 % (ref 38–73)
NRBC BLD-RTO: 1 /100 WBC
PHOSPHATE SERPL-MCNC: 3.4 MG/DL (ref 2.7–4.5)
PLATELET # BLD AUTO: 500 K/UL (ref 150–350)
PMV BLD AUTO: 11.1 FL (ref 9.2–12.9)
POTASSIUM SERPL-SCNC: 3.9 MMOL/L (ref 3.5–5.1)
PROT SERPL-MCNC: 6.4 G/DL (ref 6–8.4)
RBC # BLD AUTO: 2.49 M/UL (ref 4–5.4)
SODIUM SERPL-SCNC: 141 MMOL/L (ref 136–145)
WBC # BLD AUTO: 12.87 K/UL (ref 3.9–12.7)

## 2020-10-01 PROCEDURE — 25000003 PHARM REV CODE 250: Performed by: STUDENT IN AN ORGANIZED HEALTH CARE EDUCATION/TRAINING PROGRAM

## 2020-10-01 PROCEDURE — 11000001 HC ACUTE MED/SURG PRIVATE ROOM

## 2020-10-01 PROCEDURE — 84100 ASSAY OF PHOSPHORUS: CPT

## 2020-10-01 PROCEDURE — C9113 INJ PANTOPRAZOLE SODIUM, VIA: HCPCS | Performed by: STUDENT IN AN ORGANIZED HEALTH CARE EDUCATION/TRAINING PROGRAM

## 2020-10-01 PROCEDURE — 63600175 PHARM REV CODE 636 W HCPCS: Performed by: STUDENT IN AN ORGANIZED HEALTH CARE EDUCATION/TRAINING PROGRAM

## 2020-10-01 PROCEDURE — 25500020 PHARM REV CODE 255: Performed by: STUDENT IN AN ORGANIZED HEALTH CARE EDUCATION/TRAINING PROGRAM

## 2020-10-01 PROCEDURE — 85025 COMPLETE CBC W/AUTO DIFF WBC: CPT

## 2020-10-01 PROCEDURE — 97116 GAIT TRAINING THERAPY: CPT

## 2020-10-01 PROCEDURE — 83735 ASSAY OF MAGNESIUM: CPT

## 2020-10-01 PROCEDURE — 25000003 PHARM REV CODE 250: Performed by: NURSE PRACTITIONER

## 2020-10-01 PROCEDURE — 25000003 PHARM REV CODE 250: Performed by: COLON & RECTAL SURGERY

## 2020-10-01 PROCEDURE — 36415 COLL VENOUS BLD VENIPUNCTURE: CPT

## 2020-10-01 PROCEDURE — 97803 MED NUTRITION INDIV SUBSEQ: CPT

## 2020-10-01 PROCEDURE — 99233 PR SUBSEQUENT HOSPITAL CARE,LEVL III: ICD-10-PCS | Mod: ,,, | Performed by: HOSPITALIST

## 2020-10-01 PROCEDURE — 97530 THERAPEUTIC ACTIVITIES: CPT

## 2020-10-01 PROCEDURE — 80053 COMPREHEN METABOLIC PANEL: CPT

## 2020-10-01 PROCEDURE — 63600175 PHARM REV CODE 636 W HCPCS: Performed by: NURSE PRACTITIONER

## 2020-10-01 PROCEDURE — A4216 STERILE WATER/SALINE, 10 ML: HCPCS | Performed by: STUDENT IN AN ORGANIZED HEALTH CARE EDUCATION/TRAINING PROGRAM

## 2020-10-01 PROCEDURE — 99232 SBSQ HOSP IP/OBS MODERATE 35: CPT | Mod: ,,, | Performed by: NURSE PRACTITIONER

## 2020-10-01 PROCEDURE — 99233 SBSQ HOSP IP/OBS HIGH 50: CPT | Mod: ,,, | Performed by: HOSPITALIST

## 2020-10-01 PROCEDURE — 99232 PR SUBSEQUENT HOSPITAL CARE,LEVL II: ICD-10-PCS | Mod: ,,, | Performed by: NURSE PRACTITIONER

## 2020-10-01 RX ORDER — SODIUM CHLORIDE, SODIUM GLUCONATE, SODIUM ACETATE, POTASSIUM CHLORIDE AND MAGNESIUM CHLORIDE 30; 37; 368; 526; 502 MG/100ML; MG/100ML; MG/100ML; MG/100ML; MG/100ML
1000 INJECTION, SOLUTION INTRAVENOUS CONTINUOUS
Status: DISCONTINUED | OUTPATIENT
Start: 2020-10-01 | End: 2020-10-12

## 2020-10-01 RX ADMIN — CEFEPIME 1 G: 1 INJECTION, POWDER, FOR SOLUTION INTRAMUSCULAR; INTRAVENOUS at 12:10

## 2020-10-01 RX ADMIN — PANTOPRAZOLE SODIUM 40 MG: 40 INJECTION, POWDER, LYOPHILIZED, FOR SOLUTION INTRAVENOUS at 08:10

## 2020-10-01 RX ADMIN — HEPARIN SODIUM 5000 UNITS: 5000 INJECTION INTRAVENOUS; SUBCUTANEOUS at 02:10

## 2020-10-01 RX ADMIN — METRONIDAZOLE 500 MG: 500 TABLET ORAL at 10:10

## 2020-10-01 RX ADMIN — EPOETIN ALFA-EPBX 20000 UNITS: 10000 INJECTION, SOLUTION INTRAVENOUS; SUBCUTANEOUS at 04:10

## 2020-10-01 RX ADMIN — SODIUM CHLORIDE, SODIUM GLUCONATE, SODIUM ACETATE, POTASSIUM CHLORIDE AND MAGNESIUM CHLORIDE 1000 ML: 526; 502; 368; 37; 30 INJECTION, SOLUTION INTRAVENOUS at 12:10

## 2020-10-01 RX ADMIN — HEPARIN SODIUM 5000 UNITS: 5000 INJECTION INTRAVENOUS; SUBCUTANEOUS at 10:10

## 2020-10-01 RX ADMIN — METRONIDAZOLE 500 MG: 500 TABLET ORAL at 07:10

## 2020-10-01 RX ADMIN — Medication 10 ML: at 12:10

## 2020-10-01 RX ADMIN — Medication 10 ML: at 06:10

## 2020-10-01 RX ADMIN — IOHEXOL 15 ML: 350 INJECTION, SOLUTION INTRAVENOUS at 08:10

## 2020-10-01 RX ADMIN — CEFEPIME 1 G: 1 INJECTION, POWDER, FOR SOLUTION INTRAMUSCULAR; INTRAVENOUS at 08:10

## 2020-10-01 RX ADMIN — ONDANSETRON 4 MG: 2 INJECTION INTRAMUSCULAR; INTRAVENOUS at 10:10

## 2020-10-01 RX ADMIN — LOPERAMIDE HYDROCHLORIDE 2 MG: 2 CAPSULE ORAL at 09:10

## 2020-10-01 RX ADMIN — HEPARIN SODIUM 5000 UNITS: 5000 INJECTION INTRAVENOUS; SUBCUTANEOUS at 07:10

## 2020-10-01 RX ADMIN — CEFEPIME 1 G: 1 INJECTION, POWDER, FOR SOLUTION INTRAMUSCULAR; INTRAVENOUS at 04:10

## 2020-10-01 RX ADMIN — METRONIDAZOLE 500 MG: 500 TABLET ORAL at 02:10

## 2020-10-01 RX ADMIN — IOHEXOL 15 ML: 350 INJECTION, SOLUTION INTRAVENOUS at 10:10

## 2020-10-01 RX ADMIN — METOPROLOL TARTRATE 25 MG: 25 TABLET, FILM COATED ORAL at 09:10

## 2020-10-01 NOTE — PROGRESS NOTES
[facility]  Urology  Progress Note    Patient Name: Edita Riley  MRN: [id number]  Admission Date: 9/11/2020  Hospital Length of Stay: 20 days  Code Status: Full Code   Attending Provider: Hammad Reynolds MD   Primary Care Physician: Audrey Mustafa MD    Subjective:     HPI:  Edita Riley is a 55 y.o. female with a history of cervical cancer s/p pelvic radiation. She has since developed bilateral ureteral strictures and bilateral hydronephrosis R>L. She had a MAG3 scan confirmed presence of bilateral obstruction. She is s/p open transverse colon conduit urinary diversion on 9/11/2020.     Interval History:   Cr still trending down. Still having good UOP from colon conduit. Neph tubes remain clamped.   She continues to ambulate and eat without issue.     Objective:     Temp:  [97.6 °F (36.4 °C)-99 °F (37.2 °C)] 97.6 °F (36.4 °C)  Pulse:  [90-96] 90  Resp:  [18-19] 18  SpO2:  [98 %-100 %] 100 %  BP: (118-126)/(58-65) 118/62     Body mass index is 28.94 kg/m².           Drains     Drain                 Nephrostomy 08/13/20 0805 Left 12 Fr. 42 days         Nephrostomy 08/13/20 0809 Right 12 Fr. 42 days         Urostomy 09/11/20 ileal conduit LLQ 14 days         Ileostomy 09/18/20 RLQ 7 days         Nephrostomy 09/18/20 0025 Left 7 days         Nephrostomy 09/18/20 0025 Right 7 days         Closed/Suction Drain 09/23/20 0720 Right Abdomen Bulb 10 Fr. 1 day                Physical Exam   Constitutional: No distress.   HENT:   Head: Normocephalic and atraumatic.   Eyes: Conjunctivae are normal. No scleral icterus.   Neck: Normal range of motion.   Cardiovascular: Normal rate.    Pulmonary/Chest: Effort normal. No respiratory distress.   Abdominal: Soft. She exhibits no distension. There is no abdominal tenderness. There is no rebound and no guarding.   Urostomy p/p/p draining clear yellow urine  Nephrostomy tubes clamped   Ileostomy output thickened   IR drain LLQ draining light  yellow clear fluid  Midline incision with wound vac and packing in place    Musculoskeletal: Normal range of motion.      Comments: SCDs in place   Neurological: She is alert.   Skin: Skin is warm and dry. She is not diaphoretic.         Significant Labs:    BMP:  Recent Labs   Lab 09/29/20  0355 09/30/20  0424 10/01/20  0320    138 141   K 5.3* 4.4 3.9   * 106 101   CO2 14* 22* 28   BUN 25* 21* 17   CREATININE 1.8* 1.5* 1.2   CALCIUM 8.4* 8.3* 8.3*       CBC:   Recent Labs   Lab 09/29/20  0355 09/30/20  0424 10/01/20  0320   WBC 14.53* 12.82* 12.87*   HGB 6.8* 6.5* 6.6*   HCT 23.4* 22.1* 22.1*   * 611* 500*       All pertinent labs results from the past 24 hours have been reviewed.    Significant Imaging:  All pertinent imaging results/findings from the past 24 hours have been reviewed.    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 Repeat sooner if patient develops clinical change. Continue cefepime and flagyl until repeat CT, continue fluconazole 400mg daily for minimum of 4 weeks for retinal lesion  consistent w/ candida. Patient will need serial ophtho exams to help determine final duration of therapy.  - follow up in ID clinic in 2 weeks     - Drains: maintain, IR drain (Cr 0.7), NTs  - Prophylaxis: IS, SCDs, GI ppx        VTE Risk Mitigation (From admission, onward)         Ordered     heparin (porcine) injection 5,000 Units  Every 8 hours      09/29/20 0553     IP VTE HIGH RISK PATIENT  Once      09/11/20 2024     Place sequential compression device  Until discontinued      09/11/20 2024                Mana Wilks MD  Urology  [facility]

## 2020-10-01 NOTE — SUBJECTIVE & OBJECTIVE
Interval History:   Cr still trending down. Still having good UOP from colon conduit. Neph tubes remain clamped.   She continues to ambulate and eat without issue.     Objective:     Temp:  [97.6 °F (36.4 °C)-99 °F (37.2 °C)] 97.6 °F (36.4 °C)  Pulse:  [90-96] 90  Resp:  [18-19] 18  SpO2:  [98 %-100 %] 100 %  BP: (118-126)/(58-65) 118/62     Body mass index is 28.94 kg/m².           Drains     Drain                 Nephrostomy 08/13/20 0805 Left 12 Fr. 42 days         Nephrostomy 08/13/20 0809 Right 12 Fr. 42 days         Urostomy 09/11/20 ileal conduit LLQ 14 days         Ileostomy 09/18/20 RLQ 7 days         Nephrostomy 09/18/20 0025 Left 7 days         Nephrostomy 09/18/20 0025 Right 7 days         Closed/Suction Drain 09/23/20 0720 Right Abdomen Bulb 10 Fr. 1 day                Physical Exam   Constitutional: No distress.   HENT:   Head: Normocephalic and atraumatic.   Eyes: Conjunctivae are normal. No scleral icterus.   Neck: Normal range of motion.   Cardiovascular: Normal rate.    Pulmonary/Chest: Effort normal. No respiratory distress.   Abdominal: Soft. She exhibits no distension. There is no abdominal tenderness. There is no rebound and no guarding.   Urostomy p/p/p draining clear yellow urine  Nephrostomy tubes clamped   Ileostomy output thickened   IR drain LLQ draining light yellow clear fluid  Midline incision with wound vac and packing in place    Musculoskeletal: Normal range of motion.      Comments: SCDs in place   Neurological: She is alert.   Skin: Skin is warm and dry. She is not diaphoretic.         Significant Labs:    BMP:  Recent Labs   Lab 09/29/20  0355 09/30/20  0424 10/01/20  0320    138 141   K 5.3* 4.4 3.9   * 106 101   CO2 14* 22* 28   BUN 25* 21* 17   CREATININE 1.8* 1.5* 1.2   CALCIUM 8.4* 8.3* 8.3*       CBC:   Recent Labs   Lab 09/29/20  0355 09/30/20  0424 10/01/20  0320   WBC 14.53* 12.82* 12.87*   HGB 6.8* 6.5* 6.6*   HCT 23.4* 22.1* 22.1*   * 611* 500*        All pertinent labs results from the past 24 hours have been reviewed.    Significant Imaging:  All pertinent imaging results/findings from the past 24 hours have been reviewed.    Review of Systems

## 2020-10-01 NOTE — ASSESSMENT & PLAN NOTE
S/p urinary diversion with colon conduit 9/11   S/p emergent ex-lap 9/17, colo-colic anastomosis intact, bowel perforation found, underwent resection of right colon, remaining transverses colon, and proximal descending colon, ileostomy creation, Charlotte's pouch created     - CRS primary  - regular diet per Primary  - keep nephrostomy tubes clamped as patient is having good UOP via conduit  - continue PT/OT, OOBTC as tolerated, encourage ambulation  - Wound care ostomy consulted for assistance with ostomy teaching   - patient is a [employer]; management per primary and Heme-Onc (on EPO, B12, and iron)  - agree with ID recommendations: PICC line DC'd 9/28, cefepime for Enterobacter and flagyl for anaerobes  - 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 Repeat sooner if patient develops clinical change. Continue cefepime and flagyl until repeat CT, continue fluconazole 400mg daily for minimum of 4 weeks for retinal lesion consistent w/ candida. Patient will need serial ophtho exams to help determine final duration of therapy.  - follow up in ID clinic in 2 weeks     - Drains: maintain, IR drain (Cr 0.7), NTs  - Prophylaxis: IS, SCDs, GI ppx

## 2020-10-01 NOTE — PROGRESS NOTES
[facility]  Physical Medicine & Rehab  Progress Note    Patient Name: Edita Riley  MRN: [id number]  Admission Date: 9/11/2020  Length of Stay: 20 days  Attending Physician: Hammad Reynolds MD    Subjective:     Principal Problem:Bilateral ureteral obstruction     (10/01/2020): Patient is seen for follow-up rehab evaluation and recommendations.  Participating with therapy.     Hospital Course:   9/21: BM ModA-CGA. Sit to stand ModA x 2 ppl & RW. Ambulated 20 ft CGA & RW. UBD Jalil. LBD MaxA.   09/22/2020: Declined OT.   9/23: OT-Sit to stand CGA & RW. Ambulated 35', 50', 70' with CGA and RW, seated rest breaks between with chair follow for safety. [place] SV.   9/26: OT-Sit to stand CGA-SBA 7 RW. Trxs CGA & RW. Ambulated 15 ft + 20 ft + 35 ft + 35 ft CGA-SBA & RW.   9/30:  Sit to stand SBA. Feed (I). [place] SBA. Bathing Jalil. UBD ModA.     Past Medical History:   Diagnosis Date    Abnormal mammogram 8/25/2020    Anxiety     Cervical cancer 2014    Chronic back pain     Depression     Diarrhea due to malabsorption 11/14/2018    Fibromyalgia     Generalized abdominal pain 8/25/2020    GERD (gastroesophageal reflux disease)     Hiatal hernia 2014    History of cervical cancer 10/11/2018    History of cervical cancer 10/11/2018    Hx of psychiatric care     Cymbalta, trazodone    Hypertension     Hypomagnesemia 11/21/2018    Lactose intolerance     Metastatic squamous cell carcinoma to lymph node 10/11/2018    Nephrostomy tube displaced     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 drug 11/14/2018    Osteoarthritis of back     Psychiatric problem     Stroke 1972     Past Surgical History:   Procedure Laterality Date    ANTEGRADE NEPHROSTOGRAPHY Bilateral 9/28/2020    Procedure: Nephrostogram - antegrade;  Surgeon: Leslie Balbuena MD;  Location: [facility] OR [address] [place] [address];  Service: Urology;  Laterality: Bilateral;    BILATERAL OOPHORECTOMY  2015    CHOLECYSTECTOMY  11/09/2016    Done at  [facility], path showed chronic cholecystitis and gallstones    cold knife conization  2014    COLECTOMY, RIGHT  9/17/2020    Procedure: COLECTOMY, RIGHT Extended;  Surgeon: Hammad Reynolds MD;  Location: [facility] OR 2ND FLR;  Service: Colon and Rectal;;    COLONOSCOPY  2014    COLONOSCOPY N/A 10/24/2016    at [facility], Dr Gutiérrez    COLONOSCOPY N/A 5/18/2018    Procedure: COLONOSCOPY;  Surgeon: Arden Gutiérrez MD;  Location: [facility] ENDO (4TH FLR);  Service: Endoscopy;  Laterality: N/A;    COLONOSCOPY N/A 7/28/2020    Procedure: COLONOSCOPY;  Surgeon: Hammad Reynolds MD;  Location: [facility] ENDO (4TH FLR);  Service: Colon and Rectal;  Laterality: N/A;  covid test [place] 7/25    CYSTOSCOPY WITH URETEROSCOPY, RETROGRADE PYELOGRAPHY, AND INSERTION OF STENT Bilateral 3/21/2020    Procedure: CYSTOSCOPY, WITH RETROGRADE PYELOGRAM,;  Surgeon: Leslie Balbuena MD;  Location: [facility] OR 1ST FLR;  Service: Urology;  Laterality: Bilateral;    ESOPHAGOGASTRODUODENOSCOPY  2014    HYSTERECTOMY  2014    [facility] for cervical cancer    ILEOSTOMY  9/17/2020    Procedure: CREATION, ILEOSTOMY;  Surgeon: Hammad Reynolds MD;  Location: [facility] OR 2ND FLR;  Service: Colon and Rectal;;    MOBILIZATION OF SPLENIC FLEXURE N/A 9/11/2020    Procedure: MOBILIZATION, COLONIC;  Surgeon: Hammad Reynolds MD;  Location: [facility] OR 2ND FLR;  Service: Colon and Rectal;  Laterality: N/A;    OOPHORECTOMY      RETROPERITONEAL LYMPHADENECTOMY Right 9/17/2018    Procedure: LYMPHADENECTOMY, RETROPERITONEUM;  Surgeon: Sebas Reed MD;  Location: [facility] OR 2ND FLR;  Service: General;  Laterality: Right;  ILIAC     Review of patient's allergies indicates:   Allergen Reactions    Bee sting [allergen ext-venom-honey bee]      Rash      Grass pollen-bermuda, standard      rash       Scheduled Medications:    ceFEPime (MAXIPIME) IVPB  1 g Intravenous Q8H    cyanocobalamin  100 mcg Intramuscular Weekly    epoetin yecenia-epbx  20,000 Units Subcutaneous Q48H    fluconazole   400 mg Oral Daily    heparin (porcine)  5,000 Units Subcutaneous Q8H    loperamide  2 mg Oral BID    metoprolol tartrate  25 mg Oral BID    metroNIDAZOLE  500 mg Oral Q8H    pantoprazole  40 mg Intravenous Daily    psyllium husk (aspartame)  1 packet Oral Daily    sodium chloride 0.9%  10 mL Intravenous Q6H       PRN Medications: acetaminophen, dextrose 50%, glucagon (human recombinant), HYDROmorphone, insulin aspart U-100, iohexol, iohexol, labetalol, ondansetron, oxyCODONE-acetaminophen, oxyCODONE-acetaminophen, sodium chloride 0.9%, Flushing PICC Protocol **AND** sodium chloride 0.9% **AND** sodium chloride 0.9%, sodium chloride 0.9%    Family History     Problem Relation (Age of Onset)    Breast cancer Maternal Aunt    Cancer Father, Mother    Cervical cancer Mother    Colon cancer Father    Drug abuse Daughter, Daughter    Esophageal cancer Father    Heart disease Sister, Maternal Grandmother    Suicide Daughter        Tobacco Use    Smoking status: Former Smoker    Smokeless tobacco: Never Used   Substance and Sexual Activity    Alcohol use: No     Alcohol/week: 0.0 standard drinks    Drug use: No    Sexual activity: Yes     Partners: Male     Birth control/protection: None     Comment:  19 years since 1999     Review of Systems   Constitutional: Positive for activity change.   Respiratory: Negative for shortness of breath.    Cardiovascular: Negative for chest pain.   Genitourinary: Positive for difficulty urinating.   Musculoskeletal: Positive for gait problem.   Neurological: Positive for weakness.     Objective:     Vital Signs (Most Recent):  Temp: 97.6 °F (36.4 °C) (10/01/20 0517)  Pulse: 90 (09/30/20 1630)  Resp: 18 (09/30/20 1630)  BP: 118/62 (09/30/20 1630)  SpO2: 100 % (09/30/20 1630)    Vital Signs (24h Range):  Temp:  [97.6 °F (36.4 °C)-99 °F (37.2 °C)] 97.6 °F (36.4 °C)  Pulse:  [90-96] 90  Resp:  [18] 18  SpO2:  [98 %-100 %] 100 %  BP: (118-126)/(58-62) 118/62     Body mass  index is 28.94 kg/m².    Physical Exam  Vitals signs reviewed.   Constitutional:       General: She is not in acute distress.     Appearance: She is well-developed.      Comments: sitting [facility]   HENT:      Head: Normocephalic and atraumatic.   Eyes:      General:         Right eye: No discharge.         Left eye: No discharge.   Neck:      Musculoskeletal: Neck supple.   Cardiovascular:      Rate and Rhythm: Normal rate.      Pulses: Normal pulses.   Pulmonary:      Effort: Pulmonary effort is normal. No respiratory distress.   Abdominal:      Palpations: Abdomen is soft.      Tenderness: There is no abdominal tenderness.      Comments: Ileostomy and urostomy in place    Genitourinary:     Comments: Nephrostomy tubes in place  Wound vac and drain in place  Musculoskeletal:         General: No deformity.   Skin:     General: Skin is warm and dry.   Neurological:      Mental Status: She is alert and oriented to person, place, and time.      Motor: Weakness present.      Comments: Follows commands    Psychiatric:         Behavior: Behavior normal.         Cognition and Memory: Cognition is not impaired.       Diagnostic Results: Labs: Reviewed      Assessment/Plan:      * Bilateral ureteral obstruction  -S/p urinary diversion with colon conduit 9/11   -S/p emergent ex-lap 9/17, colo-colic anastomosis intact, bowel perforation found, underwent resection of right colon, remaining transverses colon, and proximal descending colon, ileostomy creation, Charlotte's pouch created   -repeat CT A/P pending  -wound vac and drain in place    Fungemia  -ID following  -4-6 weeks of fluconazole for candida retinitis, with serial ophtho exams to ensure lesions have resolved prior to completion of therapy  -continue cefepime and flagyl IV and per ID, if patient continues to do well clinically, recommend repeating CT A/P in 2 weeks to re-evaluate for fluid collections/need for additional drainage    Impaired functional mobility, balance,  gait, and endurance  See hospital course for functional status.      Recommendations  -  Encourage mobility, OOB in chair, and early ambulation as appropriate  -  PT/OT evaluate and treat  -  Pain management  -  DVT prophylaxis if appropriate   -  Monitor for and prevent skin breakdown and pressure ulcers  · Early mobility, repositioning/weight shifting every 20-30 minutes when sitting, turn patient every 2 hours, proper mattress/overlay and chair cushioning, pressure relief/heel protector boots      Reviewed case with Collaborative MD, Dr. Ventura.  Likely inpatient rehab once medically stable.       Rylee Knapp NP  Department of Physical Medicine & Rehab   [facility]

## 2020-10-01 NOTE — ASSESSMENT & PLAN NOTE
55 year old female with a past medical history ureteral strictures s/p nephrostomy tubes and ureteral stents with developent of ODESSA in the presence of no hydronephrosis or obstruction. Baseline Cr is 0.8-1. Cr was normal up until 9/24. ODESSA happened on 9/25 with a rise in Cr to 1.7. Hemoglobin has been around upper 6's. ODESSA possibly 2/2 pre-renal cause due to blood loss and drop in hemoglobin resulting in hypoperfusion. Urine studies suggest prerenal ODESSA. BUN and Cr downtrending.    Plan:  Switch maintenance fluids to 1/2 normal saline at 100ml/hr for next 24 hours to avoid further prerenal insult  Patient will need to follow up in Nephrology clinic in 4 weeks  Low K diet  Avoid nephrotoxic agents (NSAIDs, ACEi, ARB, contrast)  Strict I/Os  Daily standing weights  Renally dose medications

## 2020-10-01 NOTE — PROGRESS NOTES
Progress Note  Ophthalmology      SUBJECTIVE:   Interval history:    Patient seen this afternoon. Continues to deny any visual changes. No floaters, pain, changes in vision.   Meds:   Current Facility-Administered Medications:     acetaminophen tablet 650 mg, 650 mg, Oral, Q6H PRN, Kerri Castillo NP, 650 mg at 09/27/20 2118    ceFEPIme injection 1 g, 1 g, Intravenous, Q8H, Marie Meneses MD, 1 g at 10/01/20 0858    cyanocobalamin injection 100 mcg, 100 mcg, Intramuscular, Weekly, Anoop Arevalo MD, 100 mcg at 09/26/20 0923    dextrose 50% injection 12.5 g, 12.5 g, Intravenous, PRN, Kerri Castillo NP    electrolyte-A infusion 1,000 mL, 1,000 mL, Intravenous, Continuous, Hammad Reynolds MD, Last Rate: 100 mL/hr at 10/01/20 1201, 1,000 mL at 10/01/20 1201    epoetin yecenia-epbx injection 20,000 Units, 20,000 Units, Subcutaneous, Q48H, Nicky Madsen MD, 20,000 Units at 09/29/20 1721    fluconazole tablet 400 mg, 400 mg, Oral, Daily, Michelle Mohamud MD, 400 mg at 09/30/20 0946    glucagon (human recombinant) injection 1 mg, 1 mg, Intramuscular, PRN, Kerri Castillo NP    heparin (porcine) injection 5,000 Units, 5,000 Units, Subcutaneous, Q8H, Moe Mclean MD, 5,000 Units at 10/01/20 0708    HYDROmorphone injection 0.5 mg, 0.5 mg, Intravenous, Q6H PRN, Andria Hua NP    insulin aspart U-100 pen 1-10 Units, 1-10 Units, Subcutaneous, Q4H PRN, Kerri Castillo NP, 4 Units at 09/28/20 1727    iohexol (OMNIPAQUE) oral solution 15 mL, 15 mL, Oral, PRN, Emelina Carlson MD, 15 mL at 10/01/20 0833    iohexol (OMNIPAQUE) oral solution 15 mL, 15 mL, Oral, PRN, Bro Brambila MD    labetalol 20 mg/4 mL (5 mg/mL) IV syring, 10 mg, Intravenous, Q4H PRN, Fransisco Espino MD, 10 mg at 09/22/20 0344    loperamide capsule 2 mg, 2 mg, Oral, BID, Cecile Francisco MD, 2 mg at 09/30/20 2151    metoprolol tartrate (LOPRESSOR) tablet 25 mg, 25 mg, Oral, BID, Kerri Castillo,  NP, 25 mg at 09/30/20 2151    metroNIDAZOLE tablet 500 mg, 500 mg, Oral, Q8H, Hind Jose Luis Meneses MD, 500 mg at 10/01/20 0709    ondansetron injection 4 mg, 4 mg, Intravenous, Q6H PRN, Kerri Castillo NP, 4 mg at 10/01/20 1027    oxyCODONE-acetaminophen  mg per tablet 1 tablet, 1 tablet, Oral, Q4H PRN, Andria Hua NP    oxyCODONE-acetaminophen 5-325 mg per tablet 1 tablet, 1 tablet, Oral, Q4H PRN, Andria Hua NP    pantoprazole injection 40 mg, 40 mg, Intravenous, Daily, Moe Mclean MD, 40 mg at 10/01/20 0851    psyllium husk (aspartame) 3.4 gram packet 1 packet, 1 packet, Oral, Daily, Kerri Castillo NP, 1 packet at 09/30/20 0946    sodium chloride 0.9% flush 10 mL, 10 mL, Intravenous, PRN, Mana Wilks MD    Flushing PICC Protocol, , , Until Discontinued **AND** sodium chloride 0.9% flush 10 mL, 10 mL, Intravenous, Q6H, 10 mL at 10/01/20 0600 **AND** sodium chloride 0.9% flush 10 mL, 10 mL, Intravenous, PRN, Moe Mclean MD    sodium chloride 0.9% flush 3 mL, 3 mL, Intravenous, PRN, Mana Wilks MD    OBJECTIVE:       Vital Signs (Most Recent):  Vitals:    10/01/20 0517   BP: (!) 139/92   Pulse: 100   Resp: (!) 21   Temp: 97.6 °F (36.4 °C)         Eye Examination:    Base Eye Exam     Visual Acuity       Right Left    Near sc 20/60 20/60    Near cc 20/25-1 30/30          Tonometry (Tonopen, 1:20 PM)       Right Left    Pressure 14 16          Pupils       Dark Light Shape React APD    Right 3 2 Round Brisk None    Left 3 2 Round Brisk None          Visual Fields       Right Left     Full Full          Extraocular Movement       Right Left     -2 -2 -2   --  --   -- -- --    -2 -2 -2   --  --   -- -- --             Neuro/Psych     Oriented x3: Yes    Mood/Affect: Normal   Drowsy            Dilation     Both eyes: 1% Mydriacyl, 2.5% Phenylephrine @ 1:20 PM            Slit Lamp and Fundus Exam     External Exam       Right Left     External Normal Normal          Pen Light Exam       Right Left    Lids/Lashes Normal Ptosis     Conjunctiva/Sclera White and quiet White and quiet    Cornea Clear Clear    Anterior Chamber Deep and quiet Deep and quiet    Iris Round and reactive Round and reactive    Lens Clear Clear    Vitreous Normal Normal. No haze or cell.           Fundus Exam       Right Left    Disc Normal Pink, sharp. Small yellow, discrete lesion along superior arcade.     C/D Ratio 0.4 0.4    Macula Normal Normal    Vessels Normal Normal    Periphery Normal Normal. No other lesions noted.                 ASSESSMENT/PLAN:     1. Candida Retinitis, left eye   - + blood cultures for Candida   - Exam with Va 20/30 left eye. No evidence of AC reax. No hypopyon. Small, yellow creamy lesion along superior arcade without surrounding changes. No other lesions noted. Vitreous clear without haze or cell.   - Recommend continuing systemic antifungal with adequate ocular penetration such as fluconazole   - Lesion is stable today. No need for invasive intervention at this time.   - We will continue to follow patient during treatment       Fransisco Conway MD PGY-2  [employer] [facility]   10/1/2020  8:40 AM

## 2020-10-01 NOTE — SUBJECTIVE & OBJECTIVE
Interval History: Patient feels okay today. Her appetite is good. Denies chest pain, shortness of breath, nausea, vomiting, abdominal pain, leg swelling.    Review of patient's allergies indicates:   Allergen Reactions    Bee sting [allergen ext-venom-honey bee]      Rash      Grass pollen-bermuda, standard      rash     Current Facility-Administered Medications   Medication Frequency    acetaminophen tablet 650 mg Q6H PRN    ceFEPIme injection 1 g Q8H    cyanocobalamin injection 100 mcg Weekly    dextrose 50% injection 12.5 g PRN    electrolyte-A infusion 1,000 mL Continuous    epoetin yecenia-epbx injection 20,000 Units Q48H    fluconazole tablet 400 mg Daily    glucagon (human recombinant) injection 1 mg PRN    heparin (porcine) injection 5,000 Units Q8H    HYDROmorphone injection 0.5 mg Q6H PRN    insulin aspart U-100 pen 1-10 Units Q4H PRN    iohexol (OMNIPAQUE) oral solution 15 mL PRN    iohexol (OMNIPAQUE) oral solution 15 mL PRN    iohexol (OMNIPAQUE) oral solution 15 mL PRN    labetalol 20 mg/4 mL (5 mg/mL) IV syring Q4H PRN    loperamide capsule 2 mg BID    metoprolol tartrate (LOPRESSOR) tablet 25 mg BID    metroNIDAZOLE tablet 500 mg Q8H    ondansetron injection 4 mg Q6H PRN    oxyCODONE-acetaminophen  mg per tablet 1 tablet Q4H PRN    oxyCODONE-acetaminophen 5-325 mg per tablet 1 tablet Q4H PRN    pantoprazole injection 40 mg Daily    psyllium husk (aspartame) 3.4 gram packet 1 packet Daily    sodium chloride 0.9% flush 10 mL PRN    sodium chloride 0.9% flush 10 mL Q6H    And    sodium chloride 0.9% flush 10 mL PRN    sodium chloride 0.9% flush 3 mL PRN       Objective:     Vital Signs (Most Recent):  Temp: 97.6 °F (36.4 °C) (10/01/20 0517)  Pulse: 90 (09/30/20 1630)  Resp: 18 (09/30/20 1630)  BP: 118/62 (09/30/20 1630)  SpO2: 100 % (09/30/20 1630)  O2 Device (Oxygen Therapy): room air (09/29/20 0124) Vital Signs (24h Range):  Temp:  [97.6 °F (36.4 °C)-99 °F (37.2 °C)]  97.6 °F (36.4 °C)  Pulse:  [90-96] 90  Resp:  [18] 18  SpO2:  [98 %-100 %] 100 %  BP: (118-126)/(58-62) 118/62     Weight: 88.9 kg (196 lb) (09/29/20 1545)  Body mass index is 28.94 kg/m².  Body surface area is 2.08 meters squared.    I/O last 3 completed shifts:  In: 2426 [P.O.:1626; I.V.:800]  Out: 2410 [Urine:1900; Drains:10; Stool:500]    Physical Exam  Constitutional:       General: She is not in acute distress.  HENT:      Head: Normocephalic and atraumatic.      Mouth/Throat:      Mouth: Mucous membranes are moist.      Pharynx: No oropharyngeal exudate.   Eyes:      General:         Right eye: No discharge.         Left eye: No discharge.      Extraocular Movements: Extraocular movements intact.   Cardiovascular:      Rate and Rhythm: Regular rhythm. Tachycardia present.      Heart sounds: No murmur.   Pulmonary:      Effort: Pulmonary effort is normal. No respiratory distress.      Breath sounds: Normal breath sounds.   Abdominal:      General: There is no distension.      Tenderness: There is no guarding.   Skin:     General: Skin is warm and dry.   Neurological:      Mental Status: She is alert and oriented to person, place, and time.   Psychiatric:         Mood and Affect: Mood normal.         Behavior: Behavior normal.         Significant Labs:  CBC:   Recent Labs   Lab 10/01/20  0320   WBC 12.87*   RBC 2.49*   HGB 6.6*   HCT 22.1*   *   MCV 89   MCH 26.5*   MCHC 29.9*     CMP:   Recent Labs   Lab 10/01/20  0320   *   CALCIUM 8.3*   ALBUMIN 1.8*   PROT 6.4      K 3.9   CO2 28      BUN 17   CREATININE 1.2   ALKPHOS 107   ALT 9*   AST 18   BILITOT 0.3     Recent Labs   Lab 09/28/20  1729   COLORU Yellow   SPECGRAV 1.025   PHUR 6.0   PROTEINUA 1+*   BACTERIA Moderate*   NITRITE Negative   LEUKOCYTESUR 2+*   HYALINECASTS 0     All labs within the past 24 hours have been reviewed.     Significant Imaging:    IR Nephrostomy tube change 9/28:  Impression:     Bilateral nephrostomy  tube exchange as described above.     Plan:     With no surgical intervention is planned, the patient should have nephrostomy tubes exchange in 3 to 6 months.

## 2020-10-01 NOTE — ASSESSMENT & PLAN NOTE
Ms. Riley is a 55 y.o. female with b/l ureteral obstruction s/p transverse colon conduit on 9/11/2020. Extended R colectomy and ileostomy creation on 9/17.     - currently NPO for scan; patient reports tolerating diet well.  - continue IV antibiotics per ID's recs  - plan for repeat CT A/P today with PO contrast   - ODESSA resolving; BUN/Cr downtrending; 17/1.2 today  - nephrology on board, appreciating recs.   - ileostomy and urostomy in place with appropriate output  - continue to monitor I/Os  -  nephrosotrogram 9/28   - OOB, activity as tolerated  - PT/OT

## 2020-10-01 NOTE — ASSESSMENT & PLAN NOTE
Nutrition Problem:  Moderate Protein-Calorie Malnutrition  Malnutrition in the context of Chronic Illness/Injury    Related to (etiology):  Inadequate Energy Intake; Decreased appetite with N/V    Signs and Symptoms (as evidenced by):  Energy Intake: <75% of estimated energy requirement for > 3 months  Body Fat Depletion: well-nourished   Muscle Mass Depletion: mild depletion of temples and clavicle region   Weight Loss: 17% x 6 months      Interventions (treatment strategy):  Collaboration of nutrition care with other providers    Nutrition Diagnosis Status:  Continues

## 2020-10-01 NOTE — PROGRESS NOTES
"[facility]  Adult Nutrition  Progress Note    SUMMARY       Recommendations    Pt meets chronic moderate malnutrition criteria.      1. Continue current diet as tolerated.      2. Add Boost Plus with meals to aid in caloric intake.      3. RD following.     Goals: meet >75% EEN/EPN by RD follow-up  Nutrition Goal Status: progressing towards goal  Communication of RD Recs: (POC)    Reason for Assessment    Reason For Assessment: RD follow-up  Diagnosis: (Bilateral ureteral obstruction )  Relevant Medical History: cervical cancer s/p pelvic radiation, Fibromyalgia, HTN  Interdisciplinary Rounds: did not attend  General Information Comments: TPN + Iv lipids discontinued. Pt reports good appetite, but them stated it was a little decreased. Pt reports eating ~50% of meals. PEr chart review, po intake vaires from %. Pt would like Boost Plus with meals. Denies N/V. NFPE completed . Pt with mild muscle/fat wasting. Pt meets chronic moderate malnutrition criteria.   Nutrition Discharge Planning: unable to determine at this time    Nutrition Risk Screen    Nutrition Risk Screen: tube feeding or parenteral nutrition    Nutrition/Diet History    Spiritual, Cultural Beliefs, Yazidism Practices, Values that Affect Care: no  Factors Affecting Nutritional Intake: altered gastrointestinal function, NPO    Anthropometrics    Temp: 97.6 °F (36.4 °C)  Height Method: Measured  Height: 5' 9" (175.3 cm)  Height (inches): 69 in  Weight Method: Bed Scale  Weight: 88.9 kg (196 lb)  Weight (lb): 196 lb  Ideal Body Weight (IBW), Female: 145 lb  % Ideal Body Weight, Female (lb): 135.17 %  BMI (Calculated): 28.9  BMI Grade: 25 - 29.9 - overweight  Weight Loss: unintentional  Usual Body Weight (UBW), k.5 kg(per chart review 3/22/20)  % Usual Body Weight: 82.88  % Weight Change From Usual Weight: -17.3 %    Lab/Procedures/Meds    Pertinent Labs Reviewed: reviewed  Pertinent Labs Comments: GFR 58, gluocse 122, " ALT 9   Pertinent Medications Reviewed: reviewed  Pertinent Medications Comments: metoprolol, psyllium husk    Estimated/Assessed Needs    Weight Used For Calorie Calculations: 88.9 kg (195 lb 15.8 oz)  Energy Calorie Requirements (kcal): 1922 kcal/day  Energy Need Method: Runnels-St Jeor(x 1.25)  Protein Requirements:  gm/day(1.0-1.2 gm/kg)  Weight Used For Protein Calculations: 88.9 kg (195 lb 15.8 oz)  Fluid Requirements (mL): 1 mL/kcal or per MD     RDA Method (mL): 1922     Nutrition Prescription Ordered    Current Diet Order: Low K    Evaluation of Received Nutrient/Fluid Intake    I/O: -20.032L since 9/17  Comments: LBM 9/30(ostomy)  Tolerance: tolerating  % Intake of Estimated Energy Needs: Other: 50%  % Meal Intake: Other: 50%    Nutrition Risk    Level of Risk/Frequency of Follow-up: (f/u 1 x wk)     Assessment and Plan    Moderate malnutrition  Nutrition Problem:  Moderate Protein-Calorie Malnutrition  Malnutrition in the context of Chronic Illness/Injury    Related to (etiology):  Inadequate Energy Intake; Decreased appetite with N/V    Signs and Symptoms (as evidenced by):  Energy Intake: <75% of estimated energy requirement for > 3 months  Body Fat Depletion: well-nourished   Muscle Mass Depletion: mild depletion of temples and clavicle region   Weight Loss: 17% x 6 months      Interventions (treatment strategy):  Collaboration of nutrition care with other providers    Nutrition Diagnosis Status:  Continues              Monitor and Evaluation    Food and Nutrient Intake: energy intake, food and beverage intake  Food and Nutrient Adminstration: diet order  Physical Activity and Function: nutrition-related ADLs and IADLs  Anthropometric Measurements: weight, weight change, body mass index  Biochemical Data, Medical Tests and Procedures: electrolyte and renal panel, gastrointestinal profile, glucose/endocrine profile, inflammatory profile, lipid profile  Nutrition-Focused Physical Findings: overall  appearance     Malnutrition Assessment  Malnutrition Type: chronic illness          Weight Loss (Malnutrition): (17% x 1 month)  Energy Intake (Malnutrition): less than 75% for greater than or equal to 3 months   Upper Arm Region (Subcutaneous Fat Loss): well nourished   Ney Region (Muscle Loss): mild depletion  Clavicle Bone Region (Muscle Loss): mild depletion  Clavicle and Acromion Bone Region (Muscle Loss): well nourished  Anterior Thigh Region (Muscle Loss): well nourished                 Nutrition Follow-Up    RD Follow-up?: Yes

## 2020-10-01 NOTE — PROGRESS NOTES
[facility]  Colorectal Surgery  Progress Note    Patient Name: Edita Riley  MRN: [id number]  Admission Date: 9/11/2020  Hospital Length of Stay: 20 days  Attending Physician: Hammad Reynolds MD    Subjective:     Interval History: Patient seen and examined this am. No acute events overnight. Patient progressing well-- tolerating diet. Currently NPO for repeat CT scan today. No new complaints at this time.     Post-Op Info:  Procedure(s) (LRB):  Nephrostogram - antegrade (Bilateral)  REMOVAL, STENT, URETER (Bilateral)   3 Days Post-Op      Medications:  Continuous Infusions:   sodium bicarbonate drip 100 mL/hr at 09/30/20 1426     Scheduled Meds:   ceFEPime (MAXIPIME) IVPB  1 g Intravenous Q8H    cyanocobalamin  100 mcg Intramuscular Weekly    epoetin yecenia-epbx  20,000 Units Subcutaneous Q48H    fluconazole  400 mg Oral Daily    heparin (porcine)  5,000 Units Subcutaneous Q8H    loperamide  2 mg Oral BID    metoprolol tartrate  25 mg Oral BID    metroNIDAZOLE  500 mg Oral Q8H    pantoprazole  40 mg Intravenous Daily    psyllium husk (aspartame)  1 packet Oral Daily    sodium chloride 0.9%  10 mL Intravenous Q6H     PRN Meds:   acetaminophen    dextrose 50%    glucagon (human recombinant)    HYDROmorphone    insulin aspart U-100    iohexol    iohexol    labetalol    ondansetron    oxyCODONE-acetaminophen    oxyCODONE-acetaminophen    sodium chloride 0.9%    sodium chloride 0.9%    sodium chloride 0.9%        Objective:     Vital Signs (Most Recent):  Temp: 97.6 °F (36.4 °C) (10/01/20 0517)  Pulse: 90 (09/30/20 1630)  Resp: 18 (09/30/20 1630)  BP: 118/62 (09/30/20 1630)  SpO2: 100 % (09/30/20 1630) Vital Signs (24h Range):  Temp:  [97.6 °F (36.4 °C)-99 °F (37.2 °C)] 97.6 °F (36.4 °C)  Pulse:  [90-96] 90  Resp:  [18] 18  SpO2:  [98 %-100 %] 100 %  BP: (118-126)/(58-62) 118/62     Intake/Output - Last 3 Shifts       09/29 0700 - 09/30 0659 09/30 0700 - 10/01 0659  10/01 0700 - 10/02 0659    P.O. 1200 1386     I.V. (mL/kg) 1855 (20.9)      Total Intake(mL/kg) 3055 (34.4) 1386 (15.6)     Urine (mL/kg/hr) 2000 (0.9) 900 (0.4)     Drains 0 10     Other 0 0     Stool 500 200     Total Output 2500 1110     Net +555 +276            Stool Occurrence 0 x            Physical Exam  Vitals signs and nursing note reviewed.   Constitutional:       Appearance: She is well-developed. She is not ill-appearing.   HENT:      Head: Normocephalic.   Eyes:      Pupils: Pupils are equal, round, and reactive to light.   Cardiovascular:      Rate and Rhythm: Normal rate and regular rhythm.      Heart sounds: Normal heart sounds.   Pulmonary:      Effort: Pulmonary effort is normal. No respiratory distress.      Breath sounds: Normal breath sounds. No wheezing or rales.   Abdominal:      Palpations: Abdomen is soft. There is no mass.      Tenderness: There is no guarding or rebound.      Comments: Ileostomy in place to RLQ with gas and stool within ostomy bag.  Urostomy bag in place to LLQ with clear, yellow urine  Interval removal of nephrostomy tube.   Midline wound vac remains in place with excellent seal   Musculoskeletal: Normal range of motion.   Skin:     General: Skin is warm and dry.   Neurological:      Mental Status: She is alert and oriented to person, place, and time.   Psychiatric:         Behavior: Behavior normal.         Thought Content: Thought content normal.         Judgment: Judgment normal.           Significant Labs:  BMP:   Recent Labs   Lab 10/01/20  0320   *      K 3.9      CO2 28   BUN 17   CREATININE 1.2   CALCIUM 8.3*   MG 1.6     CBC:   Recent Labs   Lab 10/01/20  0320   WBC 12.87*   RBC 2.49*   HGB 6.6*   HCT 22.1*   *   MCV 89   MCH 26.5*   MCHC 29.9*       Significant Diagnostics:  I have reviewed all pertinent imaging results/findings within the past 24 hours.    Assessment/Plan:     * Bilateral ureteral obstruction  Ms. Riley is a 55  y.o. female with b/l ureteral obstruction s/p transverse colon conduit on 9/11/2020. Extended R colectomy and ileostomy creation on 9/17.     - currently NPO for scan; patient reports tolerating diet well.  - continue IV antibiotics per ID's recs  - plan for repeat CT A/P today with PO contrast   - ODESSA resolving; BUN/Cr downtrending; 17/1.2 today  - nephrology on board, appreciating recs.   - ileostomy and urostomy in place with appropriate output  - continue to monitor I/Os  - patient with asymptomatic anemia; unable to transfuse due to Zoroastrianism beliefs  - blood pressure well controlled on current PO regimen  -  nephrosotrogram 9/28   - OOB, activity as tolerated  - PT/OT  - Wound care on board for ostomy care; appreciate assistance                Mark Martinez MD  Colorectal Surgery  [facility]

## 2020-10-01 NOTE — SUBJECTIVE & OBJECTIVE
Past Medical History:   Diagnosis Date    Abnormal mammogram 8/25/2020    Anxiety     Cervical cancer 2014    Chronic back pain     Depression     Diarrhea due to malabsorption 11/14/2018    Fibromyalgia     Generalized abdominal pain 8/25/2020    GERD (gastroesophageal reflux disease)     Hiatal hernia 2014    History of cervical cancer 10/11/2018    History of cervical cancer 10/11/2018    Hx of psychiatric care     Cymbalta, trazodone    Hypertension     Hypomagnesemia 11/21/2018    Lactose intolerance     Metastatic squamous cell carcinoma to lymph node 10/11/2018    Nephrostomy tube displaced     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 drug 11/14/2018    Osteoarthritis of back     Psychiatric problem     Stroke 1972     Past Surgical History:   Procedure Laterality Date    ANTEGRADE NEPHROSTOGRAPHY Bilateral 9/28/2020    Procedure: Nephrostogram - antegrade;  Surgeon: Leslie Balbuena MD;  Location: [facility] OR 1ST [facility];  Service: Urology;  Laterality: Bilateral;    BILATERAL OOPHORECTOMY  2015    CHOLECYSTECTOMY  11/09/2016    Done at [facility], path showed chronic cholecystitis and gallstones    cold knife conization  2014    COLECTOMY, RIGHT  9/17/2020    Procedure: COLECTOMY, RIGHT Extended;  Surgeon: Hammad Reynolds MD;  Location: [facility] OR 2ND FLR;  Service: Colon and Rectal;;    COLONOSCOPY  2014    COLONOSCOPY N/A 10/24/2016    at [facility], Dr Gutiérrez    COLONOSCOPY N/A 5/18/2018    Procedure: COLONOSCOPY;  Surgeon: Arden Gutiérrez MD;  Location: [facility] (4TH FLR);  Service: Endoscopy;  Laterality: N/A;    COLONOSCOPY N/A 7/28/2020    Procedure: COLONOSCOPY;  Surgeon: Hammad Reynolds MD;  Location: [facility] (4TH FLR);  Service: Colon and Rectal;  Laterality: N/A;  covid test [place] 7/25    CYSTOSCOPY WITH URETEROSCOPY, RETROGRADE PYELOGRAPHY, AND INSERTION OF STENT Bilateral 3/21/2020    Procedure: CYSTOSCOPY, WITH RETROGRADE PYELOGRAM,;  Surgeon: Leslie Balbuena MD;  Location:  [facility] OR 1ST FLR;  Service: Urology;  Laterality: Bilateral;    ESOPHAGOGASTRODUODENOSCOPY  2014    HYSTERECTOMY  2014    [facility] for cervical cancer    ILEOSTOMY  9/17/2020    Procedure: CREATION, ILEOSTOMY;  Surgeon: Hammad Reynolds MD;  Location: [facility] OR 2ND FLR;  Service: Colon and Rectal;;    MOBILIZATION OF SPLENIC FLEXURE N/A 9/11/2020    Procedure: MOBILIZATION, COLONIC;  Surgeon: Hammad Reynolds MD;  Location: [facility] OR 2ND FLR;  Service: Colon and Rectal;  Laterality: N/A;    OOPHORECTOMY      RETROPERITONEAL LYMPHADENECTOMY Right 9/17/2018    Procedure: LYMPHADENECTOMY, RETROPERITONEUM;  Surgeon: Sebas Reed MD;  Location: [facility] OR 2ND FLR;  Service: General;  Laterality: Right;  ILIAC     Review of patient's allergies indicates:   Allergen Reactions    Bee sting [allergen ext-venom-honey bee]      Rash      Grass pollen-bermuda, standard      rash       Scheduled Medications:    ceFEPime (MAXIPIME) IVPB  1 g Intravenous Q8H    cyanocobalamin  100 mcg Intramuscular Weekly    epoetin yecenia-epbx  20,000 Units Subcutaneous Q48H    fluconazole  400 mg Oral Daily    heparin (porcine)  5,000 Units Subcutaneous Q8H    loperamide  2 mg Oral BID    metoprolol tartrate  25 mg Oral BID    metroNIDAZOLE  500 mg Oral Q8H    pantoprazole  40 mg Intravenous Daily    psyllium husk (aspartame)  1 packet Oral Daily    sodium chloride 0.9%  10 mL Intravenous Q6H       PRN Medications: acetaminophen, dextrose 50%, glucagon (human recombinant), HYDROmorphone, insulin aspart U-100, iohexol, iohexol, labetalol, ondansetron, oxyCODONE-acetaminophen, oxyCODONE-acetaminophen, sodium chloride 0.9%, Flushing PICC Protocol **AND** sodium chloride 0.9% **AND** sodium chloride 0.9%, sodium chloride 0.9%    Family History     Problem Relation (Age of Onset)    Breast cancer Maternal Aunt    Cancer Father, Mother    Cervical cancer Mother    Colon cancer Father    Drug abuse Daughter, Daughter    Esophageal cancer Father     Heart disease Sister, Maternal Grandmother    Suicide Daughter        Tobacco Use    Smoking status: Former Smoker    Smokeless tobacco: Never Used   Substance and Sexual Activity    Alcohol use: No     Alcohol/week: 0.0 standard drinks    Drug use: No    Sexual activity: Yes     Partners: Male     Birth control/protection: None     Comment:  19 years since 1999     Review of Systems   Constitutional: Positive for activity change.   Respiratory: Negative for shortness of breath.    Cardiovascular: Negative for chest pain.   Genitourinary: Positive for difficulty urinating.   Musculoskeletal: Positive for gait problem.   Neurological: Positive for weakness.     Objective:     Vital Signs (Most Recent):  Temp: 97.6 °F (36.4 °C) (10/01/20 0517)  Pulse: 90 (09/30/20 1630)  Resp: 18 (09/30/20 1630)  BP: 118/62 (09/30/20 1630)  SpO2: 100 % (09/30/20 1630)    Vital Signs (24h Range):  Temp:  [97.6 °F (36.4 °C)-99 °F (37.2 °C)] 97.6 °F (36.4 °C)  Pulse:  [90-96] 90  Resp:  [18] 18  SpO2:  [98 %-100 %] 100 %  BP: (118-126)/(58-62) 118/62     Body mass index is 28.94 kg/m².    Physical Exam  Vitals signs reviewed.   Constitutional:       General: She is not in acute distress.     Appearance: She is well-developed.      Comments: sitting UIC   HENT:      Head: Normocephalic and atraumatic.   Eyes:      General:         Right eye: No discharge.         Left eye: No discharge.   Neck:      Musculoskeletal: Neck supple.   Cardiovascular:      Rate and Rhythm: Normal rate.      Pulses: Normal pulses.   Pulmonary:      Effort: Pulmonary effort is normal. No respiratory distress.   Abdominal:      Palpations: Abdomen is soft.      Tenderness: There is no abdominal tenderness.      Comments: Ileostomy in place      Genitourinary:     Comments: Nephrostomy tubes in place  Musculoskeletal:         General: No deformity.   Skin:     General: Skin is warm and dry.   Neurological:      Mental Status: She is alert and  oriented to person, place, and time.      Motor: Weakness present.      Comments: Follows commands    Psychiatric:         Behavior: Behavior normal.         Cognition and Memory: Cognition is not impaired.       NEUROLOGICAL EXAMINATION:     MENTAL STATUS   Oriented to person, place, and time.       Diagnostic Results: Labs: Reviewed

## 2020-10-01 NOTE — SUBJECTIVE & OBJECTIVE
Subjective:     Interval History: Patient seen and examined this am. No acute events overnight. Patient progressing well-- tolerating diet. Currently NPO for repeat CT scan today. No new complaints at this time.     Post-Op Info:  Procedure(s) (LRB):  Nephrostogram - antegrade (Bilateral)  REMOVAL, STENT, URETER (Bilateral)   3 Days Post-Op      Medications:  Continuous Infusions:   sodium bicarbonate drip 100 mL/hr at 09/30/20 1426     Scheduled Meds:   ceFEPime (MAXIPIME) IVPB  1 g Intravenous Q8H    cyanocobalamin  100 mcg Intramuscular Weekly    epoetin yecenia-epbx  20,000 Units Subcutaneous Q48H    fluconazole  400 mg Oral Daily    heparin (porcine)  5,000 Units Subcutaneous Q8H    loperamide  2 mg Oral BID    metoprolol tartrate  25 mg Oral BID    metroNIDAZOLE  500 mg Oral Q8H    pantoprazole  40 mg Intravenous Daily    psyllium husk (aspartame)  1 packet Oral Daily    sodium chloride 0.9%  10 mL Intravenous Q6H     PRN Meds:   acetaminophen    dextrose 50%    glucagon (human recombinant)    HYDROmorphone    insulin aspart U-100    iohexol    iohexol    labetalol    ondansetron    oxyCODONE-acetaminophen    oxyCODONE-acetaminophen    sodium chloride 0.9%    sodium chloride 0.9%    sodium chloride 0.9%        Objective:     Vital Signs (Most Recent):  Temp: 97.6 °F (36.4 °C) (10/01/20 0517)  Pulse: 90 (09/30/20 1630)  Resp: 18 (09/30/20 1630)  BP: 118/62 (09/30/20 1630)  SpO2: 100 % (09/30/20 1630) Vital Signs (24h Range):  Temp:  [97.6 °F (36.4 °C)-99 °F (37.2 °C)] 97.6 °F (36.4 °C)  Pulse:  [90-96] 90  Resp:  [18] 18  SpO2:  [98 %-100 %] 100 %  BP: (118-126)/(58-62) 118/62     Intake/Output - Last 3 Shifts       09/29 0700 - 09/30 0659 09/30 0700 - 10/01 0659 10/01 0700 - 10/02 0659    P.O. 1200 1386     I.V. (mL/kg) 1855 (20.9)      Total Intake(mL/kg) 3055 (34.4) 1386 (15.6)     Urine (mL/kg/hr) 2000 (0.9) 900 (0.4)     Drains 0 10     Other 0 0     Stool 500 200     Total Output  2500 1110     Net +555 +276            Stool Occurrence 0 x            Physical Exam  Vitals signs and nursing note reviewed.   Constitutional:       Appearance: She is well-developed. She is not ill-appearing.   HENT:      Head: Normocephalic.   Eyes:      Pupils: Pupils are equal, round, and reactive to light.   Cardiovascular:      Rate and Rhythm: Normal rate and regular rhythm.      Heart sounds: Normal heart sounds.   Pulmonary:      Effort: Pulmonary effort is normal. No respiratory distress.      Breath sounds: Normal breath sounds. No wheezing or rales.   Abdominal:      Palpations: Abdomen is soft. There is no mass.      Tenderness: There is no guarding or rebound.      Comments: Ileostomy in place to RLQ with gas and stool within ostomy bag.  Urostomy bag in place to LLQ with clear, yellow urine  Interval removal of nephrostomy tube.   Midline wound vac remains in place with excellent seal   Musculoskeletal: Normal range of motion.   Skin:     General: Skin is warm and dry.   Neurological:      Mental Status: She is alert and oriented to person, place, and time.   Psychiatric:         Behavior: Behavior normal.         Thought Content: Thought content normal.         Judgment: Judgment normal.           Significant Labs:  BMP:   Recent Labs   Lab 10/01/20  0320   *      K 3.9      CO2 28   BUN 17   CREATININE 1.2   CALCIUM 8.3*   MG 1.6     CBC:   Recent Labs   Lab 10/01/20  0320   WBC 12.87*   RBC 2.49*   HGB 6.6*   HCT 22.1*   *   MCV 89   MCH 26.5*   MCHC 29.9*       Significant Diagnostics:  I have reviewed all pertinent imaging results/findings within the past 24 hours.

## 2020-10-01 NOTE — HPI
Edita Riley is a 55yoF with a history of cervical cancer s/p radiation resulting in ureteral strictures for which she underwent b/l nephrostomy tube placement. On 9/11 she underwent a creation of a transverse colon urinary conduit to LLQ. On 9/14 she underwent placement of bilateral ureteral stents. Her hospital stay was complicated by a post-operative bowel perforation now with a RLQ end ileostomy. IR placed a RLQ drain on 9/23 into an air fluid collection concerning for abscess in the presence of persistent leukocytosis. On 9/28, Urology removed the ureteral stents and clamped both nephrostomy tubes, with the expectation that ureteral peristalsis improvement. Nephrology and ID consults place for ODESSA and fungemia, respectively.

## 2020-10-01 NOTE — ASSESSMENT & PLAN NOTE
55 year old female with a past medical history ureteral strictures s/p nephrostomy tubes and ureteral stents with developent of ODESSA in the presence of no hydronephrosis or obstruction. Baseline Cr is 0.8-1. Cr was normal up until 9/24. ODESSA happened on 9/25 with a rise in Cr to 1.7. Hemoglobin has been around upper 6's. ODESSA possibly 2/2 pre-renal cause due to blood loss and drop in hemoglobin resulting in hypoperfusion. Urine studies suggest prerenal ODESSA. BUN and Cr downtrending.    Recommendations:  Switch maintenance fluids to 1/2 normal saline at 100ml/hr for next 24 hours  Patient will need to follow up in Nephrology clinic in 4 weeks  Low K diet  Avoid nephrotoxic agents (NSAIDs, ACEi, ARB, contrast)  Strict I/Os  Daily standing weights  Renally dose medications

## 2020-10-01 NOTE — PT/OT/SLP PROGRESS
"Physical Therapy Treatment    Patient Name:  Edita Riley   MRN:  [id number]    Recommendations:     Discharge Recommendations:  rehabilitation facility   Discharge Equipment Recommendations: bath bench, walker, rolling   Barriers to discharge: Decreased caregiver support    Assessment:     Edita Riley is a 55 y.o. female admitted with a medical diagnosis of Bilateral ureteral obstruction.  She presents with the following impairments/functional limitations:  weakness, impaired endurance, impaired self care skills, impaired functional mobilty, gait instability, impaired balance, pain, decreased lower extremity function, decreased coordination.      Patient demonstrates good motivation and improving activity tolerance with decreased pain and increasing endurance.  Patient demonstrated increased ambulation tolerance with improving independence and functional mobility.  Patient ambulated 100 ft and 60 ft with 8 min seated rest break between ambulation trials.  Patient required between CGA and min A for bed mobility, transfers and ambulation.  Patient continues to benefit from skilled PT for strengthening and mobility training.      Rehab Prognosis: Good; patient continues to benefit from acute skilled PT services to address these deficits and reach maximum level of function.  Patient remains most appropriate to discharge to rehabilitation facility  Recent Surgery: Procedure(s) (LRB):  Nephrostogram - antegrade (Bilateral)  REMOVAL, STENT, URETER (Bilateral) 3 Days Post-Op    Plan:     During this hospitalization, patient to be seen 4 x/week to address the identified rehab impairments via gait training, therapeutic activities, therapeutic exercises, neuromuscular re-education and progress toward the following goals:    · Plan of Care Expires:  10/21/20    Subjective     Subjective: "I feel good today."   Pain/Comfort:  · Pain Rating 1: 0/10  · Location - Orientation 1: generalized  · Location 1: " abdomen  · Pain Addressed 1: Pre-medicate for activity  · Pain Rating Post-Intervention 1: 0/10      Objective:     Communicated with RN prior to session.  Patient found supine with peripheral IV, SCD, wound vac, ORESTES drain, colostomy upon PT entry to room.     General Precautions: Standard, fall   Orthopedic Precautions:N/A   Braces: N/A     Functional Mobility:  · Bed Mobility:     · Supine to Sit: minimum assistance for LE management  · Sit to Supine: minimum assistance for LE management  · Transfers:     · Sit to Stand:  contact guard assistance with rolling walker and cues for hand placement  · Gait: Patient ambulated 100 ft and 50 ft with rolling walker and contact guard assistance.  Patient required ~ 8 min seated rest between gait trials.  · Balance:   · Sitting: GOOD: Maintains balance through MODERATE excursions of active trunk movement  · Standing: FAIR: Needs CONTACT GUARD during gait      AM-PAC 6 CLICK MOBILITY  Turning over in bed (including adjusting bedclothes, sheets and blankets)?: 3  Sitting down on and standing up from a chair with arms (e.g., wheelchair, bedside commode, etc.): 3  Moving from lying on back to sitting on the side of the bed?: 3  Moving to and from a bed to a chair (including a wheelchair)?: 3  Need to walk in hospital room?: 3  Climbing 3-5 steps with a railing?: 2  Basic Mobility Total Score: 17       Therapeutic Activities and Exercises:   Exercises: Patient performed 1 set(s) of 10 repetitions of  the following bed level exercises: ankle pumps, quad sets and glut sets for bilateral LE. Patient required skilled PT for instruction of exercises and appropriate cues to perform exercises safely and appropriately.      Gait training:  Patient required cues for position in walker and upright posture to increase independence and safety.  Patient required cues ~ 25% of the time.    Patient Education:    Patient educated on assistive device use, bed mobility training, Fall risk, gait  training, home safety, Home exercise program, plan of care and transfer training by explanation.  Patient was receptive to education and needs reinforcement.     Patient left up in chair with all lines intact and call button in reach.    GOALS:   Multidisciplinary Problems     Physical Therapy Goals        Problem: Physical Therapy Goal    Goal Priority Disciplines Outcome Goal Variances Interventions   Physical Therapy Goal     PT, PT/OT Ongoing, Progressing     Description: Patient re-evaluated 20 s/p further surgery.  Goals to be met by: 10/14/2020    Patient will increase functional independence with mobility by performin. Supine to sit with Set-up [place]  2. Sit to supine with Set-up [place]  3. Sit to stand transfer with Supervision  4. Bed to chair transfer with Supervision using Rolling Walker  5. Gait  x 250 feet with Stand-by Assistance using Rolling Walker.   6. Ascend/descend 3 stair with left Handrails Stand-by Assistance                   Time Tracking:     PT Received On: 10/01/20  PT Start Time: 1311     PT Stop Time: 1352  PT Total Time (min): 41 min     Billable Minutes: Gait Training 41 min    Treatment Type: Treatment  PT/PTA: PT     PTA Visit Number: 0     Fermín Davila, PT  10/01/2020

## 2020-10-01 NOTE — PT/OT/SLP PROGRESS
Occupational Therapy   Treatment    Name: Edita Riley  MRN: [id number]  Admitting Diagnosis:  Bilateral ureteral obstruction  3 Days Post-Op    Recommendations:     Discharge Recommendations: nursing facility, skilled  Discharge Equipment Recommendations:  bath bench, walker, rolling  Barriers to discharge:  None(increased level of assistance)    Assessment:     Edita Riley is a 55 y.o. female with a medical diagnosis of Bilateral ureteral obstruction.  She presents with the following Performance deficits affecting function are weakness, impaired endurance, impaired self care skills, impaired functional mobilty, gait instability, impaired balance, decreased lower extremity function, decreased safety awareness, pain, orthopedic precautions.     Pt motivated for therapy and tolerated session well. Pt with noted improvement in activity tolerance as she required 1 rest break during ambulation. Pt ambulated ~100ft CGA using RW to increase activity tolerance required for adls and functional mobility. Pt continues to progress toward her goals. Continue Ot POC    Rehab Prognosis:  Good; patient would benefit from acute skilled OT services to address these deficits and reach maximum level of function.       Plan:     Patient to be seen 4 x/week to address the above listed problems via self-care/home management, therapeutic activities, therapeutic exercises  · Plan of Care Expires: 10/11/20  · Plan of Care Reviewed with: patient    Subjective     Pain/Comfort:  · Pain Rating 1: 0/10  · Location 1: abdomen  · Pain Addressed 1: Pre-medicate for activity    Objective:     Communicated with: RN prior to session.  Patient found supine with peripheral IV, SCD, wound vac, nephrostomy upon OT entry to room.    General Precautions: Standard, fall   Orthopedic Precautions:N/A   Braces: N/A     Occupational Performance:     Bed Mobility:    · Patient completed Scooting/Bridging with contact guard  assistance  · Patient completed Supine to Sit with contact guard assistance  · Patient completed Sit to Supine with contact guard assistance     Functional Mobility/Transfers:  · Patient completed Sit <> Stand Transfer with contact guard assistance  with  rolling walker   · Patient completed Bed <> Chair Transfer using Step Transfer technique with contact guard assistance with rolling walker  · Functional Mobility: Pt motivated for therapy and tolerated session well. Pt with noted improvement in activity tolerance as she required 1 rest break during ambulation. Pt ambulated ~100ft CGA using RW to increase activity tolerance required for adls and functional mobility. Pt continues to progress toward her goals. Continue Ot POC    Activities of Daily Living:  · Upper Body Dressing: minimum assistance eliza gown  · Lower Body Dressing: moderate assistance eliza socks      [facility] 6 Click ADL: 18    Treatment & Education:  - OT/POC-  - Importance of mobility to maximize recovery.  - safety w/ functional mobility; hand placement to ensure safe transfers to various surfaces in prep for ADLs  - Reviewed gait sequence and RW management via verbalization and demonstration   - encouraged to ambulate within household environment at least every hour to hour 1/2      Patient left supine with all lines intact, call button in reach and RN notifiedEducation:      GOALS:   Multidisciplinary Problems     Occupational Therapy Goals        Problem: Occupational Therapy Goal    Goal Priority Disciplines Outcome Interventions   Occupational Therapy Goal     OT, PT/OT Ongoing, Progressing    Description: Goals to be met by: 10/8/2020    Patient will increase functional independence with ADLs by performing:    LE Dressing with Supervision.  Grooming while standing with Supervision.   Toileting from toilet with Supervision for hygiene and clothing management.   Supine to sit with Supervision.  Toilet transfer to toilet with Supervision.                      Time Tracking:     OT Date of Treatment: 10/01/20  OT Start Time: 1311  OT Stop Time: 1352  OT Total Time (min): 41 min    Billable Minutes:Therapeutic Activity 41    Danielle Funes OT  10/1/2020

## 2020-10-01 NOTE — PLAN OF CARE
Pt motivated for therapy and tolerated session well. Pt with noted improvement in activity tolerance as she required 1 rest break during ambulation. Pt ambulated ~100ft CGA using RW to increase activity tolerance required for adls and functional mobility. Pt continues to progress toward her goals. Continue Ot POC      Problem: Occupational Therapy Goal  Goal: Occupational Therapy Goal  Description: Goals to be met by: 10/8/2020    Patient will increase functional independence with ADLs by performing:    LE Dressing with Supervision.  Grooming while standing with Supervision.   Toileting from toilet with Supervision for hygiene and clothing management.   Supine to sit with Supervision.  Toilet transfer to toilet with Supervision.    Outcome: Ongoing, Progressing

## 2020-10-01 NOTE — PLAN OF CARE
Problem: Physical Therapy Goal  Goal: Physical Therapy Goal  Description: Patient re-evaluated 20 s/p further surgery.  Goals to be met by: 10/14/2020    Patient will increase functional independence with mobility by performin. Supine to sit with Set-up [place]  2. Sit to supine with Set-up [place]  3. Sit to stand transfer with Supervision  4. Bed to chair transfer with Supervision using Rolling Walker  5. Gait  x 250 feet with Stand-by Assistance using Rolling Walker.   6. Ascend/descend 3 stair with left Handrails Stand-by Assistance  Outcome: Ongoing, Progressing     Patient progressing appropriately towards current goals and plan of care remains appropriate at this time. Patient demonstrates good motivation and improving activity tolerance with decreased pain and increasing endurance.  Patient demonstrated increased ambulation tolerance with improving independence and functional mobility.  Patient ambulated 100 ft and 60 ft with 8 min seated rest break between ambulation trials.  Patient required between CGA and min A for bed mobility, transfers and ambulation.  Patient continues to benefit from skilled PT for strengthening and mobility training.      Fermín Davila PT, DPT  10/1/2020  Pager #: [phone]

## 2020-10-01 NOTE — PLAN OF CARE
Recommendations    Pt meets chronic moderate malnutrition criteria.      1. Continue current diet as tolerated.      2. Add Boost Plus with meals to aid in caloric intake.      3. RD following.     Goals: meet >75% EEN/EPN by RD follow-up  Nutrition Goal Status: progressing towards goal

## 2020-10-01 NOTE — PROGRESS NOTES
[facility]  Nephrology  Progress Note    Patient Name: Edita Riley  MRN: [id number]  Admission Date: 9/11/2020  Hospital Length of Stay: 20 days  Attending Provider: Hammad Reynolds MD   Primary Care Physician: Audrey Mustafa MD  Principal Problem:Bilateral ureteral obstruction    Subjective:     HPI: Mrs. Riley is a 55 year old female with a history of cervical cancer s/p radiation resulting in ureteral strictures for which she underwent b/l nephrostomy tube placement. On 9/11 she underwent a creation of a transverse colon urinary conduit to LLQ. On 9/14 she with placement of bilateral ureteral stents. Her hospital stay was complicated by a post-operative bowel perforation now with a RLQ end ileostomy. IR placed a RLQ drain on 9/23 into an air fluid collection concerning for abscess in the presence of persistent leukocytosis. On 9/28, Urology removed the ureteral stents and clamped both nephrostomy tubes, with the expectation that ureteral peristalsis improvement.     Nephrology was consulted for ODESSA in the presence of no hydronephrosis or obstruction. Baseline Cr is around 0.8-1. Cr was normal up until 9/24. ODESSA happened on 9/25 with a rise in Cr to 1.7.    Interval History: Patient feels okay today. Her appetite is good. Denies chest pain, shortness of breath, nausea, vomiting, abdominal pain, leg swelling.    Review of patient's allergies indicates:   Allergen Reactions    Bee sting [allergen ext-venom-honey bee]      Rash      Grass pollen-bermuda, standard      rash     Current Facility-Administered Medications   Medication Frequency    acetaminophen tablet 650 mg Q6H PRN    ceFEPIme injection 1 g Q8H    cyanocobalamin injection 100 mcg Weekly    dextrose 50% injection 12.5 g PRN    electrolyte-A infusion 1,000 mL Continuous    epoetin yecenia-epbx injection 20,000 Units Q48H    fluconazole tablet 400 mg Daily    glucagon (human recombinant) injection 1 mg PRN     heparin (porcine) injection 5,000 Units Q8H    HYDROmorphone injection 0.5 mg Q6H PRN    insulin aspart U-100 pen 1-10 Units Q4H PRN    iohexol (OMNIPAQUE) oral solution 15 mL PRN    iohexol (OMNIPAQUE) oral solution 15 mL PRN    iohexol (OMNIPAQUE) oral solution 15 mL PRN    labetalol 20 mg/4 mL (5 mg/mL) IV syring Q4H PRN    loperamide capsule 2 mg BID    metoprolol tartrate (LOPRESSOR) tablet 25 mg BID    metroNIDAZOLE tablet 500 mg Q8H    ondansetron injection 4 mg Q6H PRN    oxyCODONE-acetaminophen  mg per tablet 1 tablet Q4H PRN    oxyCODONE-acetaminophen 5-325 mg per tablet 1 tablet Q4H PRN    pantoprazole injection 40 mg Daily    psyllium husk (aspartame) 3.4 gram packet 1 packet Daily    sodium chloride 0.9% flush 10 mL PRN    sodium chloride 0.9% flush 10 mL Q6H    And    sodium chloride 0.9% flush 10 mL PRN    sodium chloride 0.9% flush 3 mL PRN       Objective:     Vital Signs (Most Recent):  Temp: 97.6 °F (36.4 °C) (10/01/20 0517)  Pulse: 90 (09/30/20 1630)  Resp: 18 (09/30/20 1630)  BP: 118/62 (09/30/20 1630)  SpO2: 100 % (09/30/20 1630)  O2 Device (Oxygen Therapy): room air (09/29/20 0124) Vital Signs (24h Range):  Temp:  [97.6 °F (36.4 °C)-99 °F (37.2 °C)] 97.6 °F (36.4 °C)  Pulse:  [90-96] 90  Resp:  [18] 18  SpO2:  [98 %-100 %] 100 %  BP: (118-126)/(58-62) 118/62     Weight: 88.9 kg (196 lb) (09/29/20 1545)  Body mass index is 28.94 kg/m².  Body surface area is 2.08 meters squared.    I/O last 3 completed shifts:  In: 2426 [P.O.:1626; I.V.:800]  Out: 2410 [Urine:1900; Drains:10; Stool:500]    Physical Exam  Constitutional:       General: She is not in acute distress.  HENT:      Head: Normocephalic and atraumatic.      Mouth/Throat:      Mouth: Mucous membranes are moist.      Pharynx: No oropharyngeal exudate.   Eyes:      General:         Right eye: No discharge.         Left eye: No discharge.      Extraocular Movements: Extraocular movements intact.   Cardiovascular:       Rate and Rhythm: Regular rhythm. Tachycardia present.      Heart sounds: No murmur.   Pulmonary:      Effort: Pulmonary effort is normal. No respiratory distress.      Breath sounds: Normal breath sounds.   Abdominal:      General: There is no distension.      Tenderness: There is no guarding.   Skin:     General: Skin is warm and dry.   Neurological:      Mental Status: She is alert and oriented to person, place, and time.   Psychiatric:         Mood and Affect: Mood normal.         Behavior: Behavior normal.         Significant Labs:  CBC:   Recent Labs   Lab 10/01/20  0320   WBC 12.87*   RBC 2.49*   HGB 6.6*   HCT 22.1*   *   MCV 89   MCH 26.5*   MCHC 29.9*     CMP:   Recent Labs   Lab 10/01/20  0320   *   CALCIUM 8.3*   ALBUMIN 1.8*   PROT 6.4      K 3.9   CO2 28      BUN 17   CREATININE 1.2   ALKPHOS 107   ALT 9*   AST 18   BILITOT 0.3     Recent Labs   Lab 09/28/20  1729   COLORU Yellow   SPECGRAV 1.025   PHUR 6.0   PROTEINUA 1+*   BACTERIA Moderate*   NITRITE Negative   LEUKOCYTESUR 2+*   HYALINECASTS 0     All labs within the past 24 hours have been reviewed.     Significant Imaging:    IR Nephrostomy tube change 9/28:  Impression:     Bilateral nephrostomy tube exchange as described above.     Plan:     With no surgical intervention is planned, the patient should have nephrostomy tubes exchange in 3 to 6 months.         Assessment/Plan:     ODESSA (acute kidney injury)  55 year old female with a past medical history ureteral strictures s/p nephrostomy tubes and ureteral stents with developent of ODESSA in the presence of no hydronephrosis or obstruction. Baseline Cr is 0.8-1. Cr was normal up until 9/24. ODESSA happened on 9/25 with a rise in Cr to 1.7. Hemoglobin has been around upper 6's. ODESSA possibly 2/2 pre-renal cause due to blood loss and drop in hemoglobin resulting in hypoperfusion. Urine studies suggest prerenal ODESSA. BUN and Cr downtrending.    Recommendations:  Switch maintenance  fluids to 1/2 normal saline at 100ml/hr for next 24 hours  Patient will need to follow up in Nephrology clinic in 4 weeks  Low K diet  Avoid nephrotoxic agents (NSAIDs, ACEi, ARB, contrast)  Strict I/Os  Daily standing weights  Renally dose medications          Thank you for your consult. I will sign off. Please contact us if you have any additional questions.    Kris Tai MD  PGY1  Nephrology  [facility]

## 2020-10-01 NOTE — PLAN OF CARE
Pt progressing and involved with plan of care. Q2h rounds made and safety maintained. No acute event this shift. Pt remain free from fall or injury throughout shift. Will continue to monitor.

## 2020-10-02 LAB
ALBUMIN SERPL BCP-MCNC: 1.9 G/DL (ref 3.5–5.2)
ALP SERPL-CCNC: 149 U/L (ref 55–135)
ALT SERPL W/O P-5'-P-CCNC: 8 U/L (ref 10–44)
ANION GAP SERPL CALC-SCNC: 8 MMOL/L (ref 8–16)
ANISOCYTOSIS BLD QL SMEAR: SLIGHT
AST SERPL-CCNC: 23 U/L (ref 10–40)
BACTERIA BLD CULT: ABNORMAL
BACTERIA SPEC ANAEROBE CULT: NORMAL
BASOPHILS # BLD AUTO: 0.04 K/UL (ref 0–0.2)
BASOPHILS NFR BLD: 0.3 % (ref 0–1.9)
BILIRUB SERPL-MCNC: 0.3 MG/DL (ref 0.1–1)
BUN SERPL-MCNC: 13 MG/DL (ref 6–20)
CALCIUM SERPL-MCNC: 8.6 MG/DL (ref 8.7–10.5)
CHLORIDE SERPL-SCNC: 102 MMOL/L (ref 95–110)
CO2 SERPL-SCNC: 32 MMOL/L (ref 23–29)
CREAT SERPL-MCNC: 1.2 MG/DL (ref 0.5–1.4)
DIFFERENTIAL METHOD: ABNORMAL
EOSINOPHIL # BLD AUTO: 0.3 K/UL (ref 0–0.5)
EOSINOPHIL NFR BLD: 1.7 % (ref 0–8)
ERYTHROCYTE [DISTWIDTH] IN BLOOD BY AUTOMATED COUNT: 19.1 % (ref 11.5–14.5)
EST. GFR  (AFRICAN AMERICAN): 58 ML/MIN/1.73 M^2
EST. GFR  (NON AFRICAN AMERICAN): 50.3 ML/MIN/1.73 M^2
GLUCOSE SERPL-MCNC: 106 MG/DL (ref 70–110)
HCT VFR BLD AUTO: 22.4 % (ref 37–48.5)
HGB BLD-MCNC: 6.6 G/DL (ref 12–16)
HYPOCHROMIA BLD QL SMEAR: ABNORMAL
IMM GRANULOCYTES # BLD AUTO: 0.11 K/UL (ref 0–0.04)
IMM GRANULOCYTES NFR BLD AUTO: 0.7 % (ref 0–0.5)
LYMPHOCYTES # BLD AUTO: 1.2 K/UL (ref 1–4.8)
LYMPHOCYTES NFR BLD: 8.1 % (ref 18–48)
MAGNESIUM SERPL-MCNC: 1.8 MG/DL (ref 1.6–2.6)
MCH RBC QN AUTO: 26.4 PG (ref 27–31)
MCHC RBC AUTO-ENTMCNC: 29.5 G/DL (ref 32–36)
MCV RBC AUTO: 90 FL (ref 82–98)
MONOCYTES # BLD AUTO: 1.1 K/UL (ref 0.3–1)
MONOCYTES NFR BLD: 7.3 % (ref 4–15)
NEUTROPHILS # BLD AUTO: 12.3 K/UL (ref 1.8–7.7)
NEUTROPHILS NFR BLD: 81.9 % (ref 38–73)
NRBC BLD-RTO: 1 /100 WBC
OVALOCYTES BLD QL SMEAR: ABNORMAL
PHOSPHATE SERPL-MCNC: 3.2 MG/DL (ref 2.7–4.5)
PLATELET # BLD AUTO: 700 K/UL (ref 150–350)
PLATELET BLD QL SMEAR: ABNORMAL
PMV BLD AUTO: 9.7 FL (ref 9.2–12.9)
POLYCHROMASIA BLD QL SMEAR: ABNORMAL
POTASSIUM SERPL-SCNC: 3.8 MMOL/L (ref 3.5–5.1)
PROT SERPL-MCNC: 6.6 G/DL (ref 6–8.4)
RBC # BLD AUTO: 2.5 M/UL (ref 4–5.4)
SODIUM SERPL-SCNC: 142 MMOL/L (ref 136–145)
WBC # BLD AUTO: 15.02 K/UL (ref 3.9–12.7)

## 2020-10-02 PROCEDURE — 25000003 PHARM REV CODE 250: Performed by: NURSE PRACTITIONER

## 2020-10-02 PROCEDURE — C9113 INJ PANTOPRAZOLE SODIUM, VIA: HCPCS | Performed by: STUDENT IN AN ORGANIZED HEALTH CARE EDUCATION/TRAINING PROGRAM

## 2020-10-02 PROCEDURE — 25000003 PHARM REV CODE 250: Performed by: STUDENT IN AN ORGANIZED HEALTH CARE EDUCATION/TRAINING PROGRAM

## 2020-10-02 PROCEDURE — 25000003 PHARM REV CODE 250: Performed by: INTERNAL MEDICINE

## 2020-10-02 PROCEDURE — 11000001 HC ACUTE MED/SURG PRIVATE ROOM

## 2020-10-02 PROCEDURE — 85025 COMPLETE CBC W/AUTO DIFF WBC: CPT

## 2020-10-02 PROCEDURE — 80053 COMPREHEN METABOLIC PANEL: CPT

## 2020-10-02 PROCEDURE — 36415 COLL VENOUS BLD VENIPUNCTURE: CPT

## 2020-10-02 PROCEDURE — A4216 STERILE WATER/SALINE, 10 ML: HCPCS | Performed by: STUDENT IN AN ORGANIZED HEALTH CARE EDUCATION/TRAINING PROGRAM

## 2020-10-02 PROCEDURE — 83735 ASSAY OF MAGNESIUM: CPT

## 2020-10-02 PROCEDURE — 84100 ASSAY OF PHOSPHORUS: CPT

## 2020-10-02 PROCEDURE — 94761 N-INVAS EAR/PLS OXIMETRY MLT: CPT

## 2020-10-02 PROCEDURE — 63600175 PHARM REV CODE 636 W HCPCS: Performed by: STUDENT IN AN ORGANIZED HEALTH CARE EDUCATION/TRAINING PROGRAM

## 2020-10-02 PROCEDURE — 25000242 PHARM REV CODE 250 ALT 637 W/ HCPCS: Performed by: NURSE PRACTITIONER

## 2020-10-02 PROCEDURE — 97606 NEG PRS WND THER DME>50 SQCM: CPT

## 2020-10-02 PROCEDURE — 99900035 HC TECH TIME PER 15 MIN (STAT)

## 2020-10-02 RX ADMIN — HEPARIN SODIUM 5000 UNITS: 5000 INJECTION INTRAVENOUS; SUBCUTANEOUS at 10:10

## 2020-10-02 RX ADMIN — METOPROLOL TARTRATE 25 MG: 25 TABLET, FILM COATED ORAL at 09:10

## 2020-10-02 RX ADMIN — CEFEPIME 1 G: 1 INJECTION, POWDER, FOR SOLUTION INTRAMUSCULAR; INTRAVENOUS at 09:10

## 2020-10-02 RX ADMIN — Medication 10 ML: at 06:10

## 2020-10-02 RX ADMIN — PANTOPRAZOLE SODIUM 40 MG: 40 INJECTION, POWDER, LYOPHILIZED, FOR SOLUTION INTRAVENOUS at 09:10

## 2020-10-02 RX ADMIN — METRONIDAZOLE 500 MG: 500 TABLET ORAL at 06:10

## 2020-10-02 RX ADMIN — CEFEPIME 1 G: 1 INJECTION, POWDER, FOR SOLUTION INTRAMUSCULAR; INTRAVENOUS at 05:10

## 2020-10-02 RX ADMIN — LOPERAMIDE HYDROCHLORIDE 2 MG: 2 CAPSULE ORAL at 09:10

## 2020-10-02 RX ADMIN — CEFEPIME 1 G: 1 INJECTION, POWDER, FOR SOLUTION INTRAMUSCULAR; INTRAVENOUS at 01:10

## 2020-10-02 RX ADMIN — METRONIDAZOLE 500 MG: 500 TABLET ORAL at 10:10

## 2020-10-02 RX ADMIN — HEPARIN SODIUM 5000 UNITS: 5000 INJECTION INTRAVENOUS; SUBCUTANEOUS at 06:10

## 2020-10-02 RX ADMIN — HEPARIN SODIUM 5000 UNITS: 5000 INJECTION INTRAVENOUS; SUBCUTANEOUS at 02:10

## 2020-10-02 RX ADMIN — PSYLLIUM HUSK 1 PACKET: 3.4 POWDER ORAL at 09:10

## 2020-10-02 RX ADMIN — Medication 10 ML: at 12:10

## 2020-10-02 RX ADMIN — FLUCONAZOLE 400 MG: 200 TABLET ORAL at 09:10

## 2020-10-02 RX ADMIN — METRONIDAZOLE 500 MG: 500 TABLET ORAL at 02:10

## 2020-10-02 NOTE — PROGRESS NOTES
[facility]  Physical Medicine & Rehab  Progress Note    Patient Name: Edita Riley  MRN: [id number]  Admission Date: 9/11/2020  Length of Stay: 21 days  Attending Physician: Hammad Reynolds MD    Subjective:     Principal Problem:Bilateral ureteral obstruction    Hospital Course:   9/21: BM ModA-CGA. Sit to stand ModA x 2 ppl & RW. Ambulated 20 ft CGA & RW. UBD [place]. LBD MaxA.   09/22/2020: Declined OT.   9/23: OT-Sit to stand CGA & RW. Ambulated 35', 50', 70' with CGA and RW, seated rest breaks between with chair follow for safety. [place] SV.   9/26: OT-Sit to stand CGA-SBA 7 RW. Trxs CGA & RW. Ambulated 15 ft + 20 ft + 35 ft + 35 ft CGA-SBA & RW.   9/30:  Sit to stand SBA. Feed (I). [place] SBA. Bathing [place]. UBD ModA.   10/1: BM [place]. Sit to stand CGA & RW. UBD [place]. LBD ModA. Gait: Patient ambulated 100 ft and 50 ft with rolling walker and contact guard assistance.  Patient required ~ 8 min seated rest between gait trials.    Interval History 10/2/2020:  Patient is seen for follow-up PM&R evaluation and recommendations: Participating w/ therapy.    HPI, Past Medical, Family, and Social History remains the same as documented in the initial encounter.    Scheduled Medications:    ceFEPime (MAXIPIME) IVPB  1 g Intravenous Q8H    cyanocobalamin  100 mcg Intramuscular Weekly    epoetin yecenia-epbx  20,000 Units Subcutaneous Q48H    fluconazole  400 mg Oral Daily    heparin (porcine)  5,000 Units Subcutaneous Q8H    loperamide  2 mg Oral BID    metoprolol tartrate  25 mg Oral BID    metroNIDAZOLE  500 mg Oral Q8H    pantoprazole  40 mg Intravenous Daily    psyllium husk (aspartame)  1 packet Oral Daily    sodium chloride 0.9%  10 mL Intravenous Q6H       Diagnostic Results: Labs: Reviewed    PRN Medications: acetaminophen, dextrose 50%, glucagon (human recombinant), HYDROmorphone, insulin aspart U-100, iohexol, iohexol, labetalol, ondansetron, oxyCODONE-acetaminophen,  oxyCODONE-acetaminophen, sodium chloride 0.9%, Flushing PICC Protocol **AND** sodium chloride 0.9% **AND** sodium chloride 0.9%, sodium chloride 0.9%    Review of Systems   Constitutional: Positive for activity change.   Respiratory: Negative for shortness of breath.    Cardiovascular: Negative for chest pain.   Genitourinary: Positive for difficulty urinating.   Musculoskeletal: Positive for gait problem.   Neurological: Positive for facial asymmetry, speech difficulty and weakness.     Objective:     Vital Signs (Most Recent):  Temp: 97.5 °F (36.4 °C) (10/02/20 0800)  Pulse: 106 (10/02/20 0800)  Resp: 20 (10/02/20 0800)  BP: 132/71 (10/02/20 0800)  SpO2: 96 % (10/02/20 0800)    Vital Signs (24h Range):  Temp:  [96.6 °F (35.9 °C)-98.4 °F (36.9 °C)] 97.5 °F (36.4 °C)  Pulse:  [] 106  Resp:  [18-20] 20  SpO2:  [95 %-97 %] 96 %  BP: (120-132)/(64-84) 132/71     Physical Exam  Vitals signs reviewed.   Constitutional:       General: She is not in acute distress.     Appearance: She is well-developed.      Comments: sitting [facility]   HENT:      Head: Normocephalic and atraumatic.   Eyes:      General:         Right eye: No discharge.         Left eye: No discharge.   Neck:      Musculoskeletal: Neck supple.   Cardiovascular:      Rate and Rhythm: Normal rate.      Pulses: Normal pulses.   Pulmonary:      Effort: Pulmonary effort is normal. No respiratory distress.   Abdominal:      Palpations: Abdomen is soft.      Tenderness: There is no abdominal tenderness.      Comments: Ileostomy and urostomy in place   Wound vac and drain in place   Musculoskeletal:         General: No deformity.   Skin:     General: Skin is warm and dry.   Neurological:      Mental Status: She is alert and oriented to person, place, and time.      Cranial Nerves: Dysarthria and facial asymmetry present.      Motor: Weakness present.      Comments: Follows commands    Psychiatric:         Behavior: Behavior normal.         Cognition and Memory:  Cognition is not impaired.     Assessment/Plan:      * Bilateral ureteral obstruction  -S/p urinary diversion with colon conduit 9/11   -S/p emergent ex-lap 9/17, colo-colic anastomosis intact, bowel perforation found, underwent resection of right colon, remaining transverses colon, and proximal descending colon, ileostomy creation, Charlotte's pouch created   -wound vac and drain in place    Fungemia  -ID following  -4-6 weeks of fluconazole for candida retinitis, with serial ophtho exams to ensure lesions have resolved prior to completion of therapy  -continue cefepime and flagyl IV and per ID, if patient continues to do well clinically, recommend repeating CT A/P in 2 weeks to re-evaluate for fluid collections/need for additional drainage  -opthalmology consulted for Candida Retinitis, left eye     Impaired functional mobility, balance, gait, and endurance  See hospital course for functional status.      Recommendations  -  Encourage mobility, OOB in chair, and early ambulation as appropriate  -  PT/OT evaluate and treat  -  Pain management  -  DVT prophylaxis if appropriate   -  Monitor for and prevent skin breakdown and pressure ulcers  · Early mobility, repositioning/weight shifting every 20-30 minutes when sitting, turn patient every 2 hours, proper mattress/overlay and chair cushioning, pressure relief/heel protector boots    Reviewed case with Collaborative MD, Dr. Ventura. PAC recommendation: Inpatient Rehab pending medical stability.      Rylee Knapp NP  Department of Physical Medicine & Rehab   [facility]

## 2020-10-02 NOTE — SUBJECTIVE & OBJECTIVE
Interval History: NAEON. Bilateral neph tubes removed yesterday. Good output from ileal conduit. Cr stable. Pain controlled, ambulating, tolerating diet.    Review of Systems  Objective:     Temp:  [96.6 °F (35.9 °C)-98.4 °F (36.9 °C)] 96.6 °F (35.9 °C)  Pulse:  [] 105  Resp:  [18] 18  SpO2:  [95 %-97 %] 97 %  BP: (120-132)/(64-84) 132/68     Body mass index is 28.94 kg/m².           Drains     Drain                 Urostomy 09/11/20 ileal conduit LLQ 21 days         Ileostomy 09/18/20 RLQ 14 days         Closed/Suction Drain 09/23/20 0720 Right Abdomen Bulb 10 Fr. 8 days         Nephrostomy 09/28/20 1210 Left 12 Fr. 3 days         Nephrostomy 09/28/20 1210 Right 12 Fr. 3 days                Physical Exam   Constitutional: No distress.   HENT:   Head: Normocephalic and atraumatic.   Eyes: Conjunctivae are normal. No scleral icterus.   Neck: Normal range of motion.   Cardiovascular: Normal rate.    Pulmonary/Chest: Effort normal. No respiratory distress.   Abdominal: Soft. She exhibits no distension. There is no abdominal tenderness. There is no rebound and no guarding.   Urostomy p/p/p draining clear yellow urine  Ileostomy output thickened   IR drain LLQ draining light yellow clear fluid  Midline incision with wound vac and packing in place    Musculoskeletal: Normal range of motion.      Comments: SCDs in place   Neurological: She is alert.   Skin: Skin is warm and dry. She is not diaphoretic.         Significant Labs:    BMP:  Recent Labs   Lab 09/30/20  0424 10/01/20  0320 10/02/20  0353    141 142   K 4.4 3.9 3.8    101 102   CO2 22* 28 32*   BUN 21* 17 13   CREATININE 1.5* 1.2 1.2   CALCIUM 8.3* 8.3* 8.6*       CBC:   Recent Labs   Lab 09/30/20  0424 10/01/20  0320 10/02/20  0353   WBC 12.82* 12.87* 15.02*   HGB 6.5* 6.6* 6.6*   HCT 22.1* 22.1* 22.4*   * 500* 700*       All pertinent labs results from the past 24 hours have been reviewed.    Significant Imaging:  All pertinent imaging  results/findings from the past 24 hours have been reviewed.

## 2020-10-02 NOTE — ASSESSMENT & PLAN NOTE
-ID following  -4-6 weeks of fluconazole for candida retinitis, with serial ophtho exams to ensure lesions have resolved prior to completion of therapy  -continue cefepime and flagyl IV and per ID, if patient continues to do well clinically, recommend repeating CT A/P in 2 weeks to re-evaluate for fluid collections/need for additional drainage  -opthalmology consulted for Candida Retinitis, left eye

## 2020-10-02 NOTE — ASSESSMENT & PLAN NOTE
Ms. Riley is a 55 y.o. female with b/l ureteral obstruction s/p transverse colon conduit on 9/11/2020. Extended R colectomy and ileostomy creation on 9/17.     - low K diet in place; tolerating well  - continue IV antibiotics per ID's recs  - repeat CT A/P showing interval improvement of RLQ collection; dilated small bowel  - ODESSA resolving; BUN/Cr downtrending; 13/1.2 today  - mIVFs electrolyte-A @100mL/hr, per nephro recs; patient to follow-up with nephro in 4 wks  - ileostomy and urostomy in place with appropriate output  - continue to monitor I/Os  -  nephrosotrogram 9/28   - OOB, activity as tolerated  - PT/OT  - SW on board; patient approved for rehab  - patient stable for discharge to rehab; pending authorization

## 2020-10-02 NOTE — PROGRESS NOTES
[facility]  Urology  Progress Note    Patient Name: Edita Riley  MRN: [id number]  Admission Date: 9/11/2020  Hospital Length of Stay: 21 days  Code Status: Full Code   Attending Provider: Hammad Reynolds MD   Primary Care Physician: Audrey Mustafa MD    Subjective:     HPI:  Edita Riley is a 55 y.o. female with a history of cervical cancer s/p pelvic radiation. She has since developed bilateral ureteral strictures and bilateral hydronephrosis R>L. She had a MAG3 scan confirmed presence of bilateral obstruction. She is s/p open transverse colon conduit urinary diversion on 9/11/2020.     Interval History: NAEON. Bilateral neph tubes removed yesterday. Good output from ileal conduit. Cr stable. Pain controlled, ambulating, tolerating diet.    Review of Systems  Objective:     Temp:  [96.6 °F (35.9 °C)-98.4 °F (36.9 °C)] 96.6 °F (35.9 °C)  Pulse:  [] 105  Resp:  [18] 18  SpO2:  [95 %-97 %] 97 %  BP: (120-132)/(64-84) 132/68     Body mass index is 28.94 kg/m².           Drains     Drain                 Urostomy 09/11/20 ileal conduit LLQ 21 days         Ileostomy 09/18/20 RLQ 14 days         Closed/Suction Drain 09/23/20 0720 Right Abdomen Bulb 10 Fr. 8 days         Nephrostomy 09/28/20 1210 Left 12 Fr. 3 days         Nephrostomy 09/28/20 1210 Right 12 Fr. 3 days                Physical Exam   Constitutional: No distress.   HENT:   Head: Normocephalic and atraumatic.   Eyes: Conjunctivae are normal. No scleral icterus.   Neck: Normal range of motion.   Cardiovascular: Normal rate.    Pulmonary/Chest: Effort normal. No respiratory distress.   Abdominal: Soft. She exhibits no distension. There is no abdominal tenderness. There is no rebound and no guarding.   Urostomy p/p/p draining clear yellow urine  Ileostomy output thickened   IR drain LLQ draining light yellow clear fluid  Midline incision with wound vac and packing in place    Musculoskeletal: Normal range of  motion.      Comments: SCDs in place   Neurological: She is alert.   Skin: Skin is warm and dry. She is not diaphoretic.         Significant Labs:    BMP:  Recent Labs   Lab 09/30/20  0424 10/01/20  0320 10/02/20  0353    141 142   K 4.4 3.9 3.8    101 102   CO2 22* 28 32*   BUN 21* 17 13   CREATININE 1.5* 1.2 1.2   CALCIUM 8.3* 8.3* 8.6*       CBC:   Recent Labs   Lab 09/30/20  0424 10/01/20  0320 10/02/20  0353   WBC 12.82* 12.87* 15.02*   HGB 6.5* 6.6* 6.6*   HCT 22.1* 22.1* 22.4*   * 500* 700*       All pertinent labs results from the past 24 hours have been reviewed.    Significant Imaging:  All pertinent imaging results/findings from the past 24 hours have been reviewed.                  Assessment/Plan:     * Bilateral ureteral obstruction  S/p urinary diversion with colon conduit 9/11   S/p emergent ex-lap 9/17, colo-colic anastomosis intact, bowel perforation found, underwent resection of right colon, remaining transverses colon, and proximal descending colon, ileostomy creation, Charlotte's pouch created     - CRS primary  - regular diet per Primary  - nephrostomy tubes removed 10/1 - Cr stable, good UOP from ostomy  - continue PT/OT, OOBTC as tolerated, encourage ambulation  - Wound care ostomy consulted for assistance with ostomy teaching   - patient is a [employer]; management per primary and Heme-Onc (on EPO, B12, and iron)  - agree with ID recommendations: PICC line DC'd 9/28, cefepime for Enterobacter and flagyl for anaerobes  - 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 Repeat sooner if patient develops clinical change. Continue cefepime and flagyl until repeat CT, continue fluconazole 400mg daily for minimum of 4 weeks for retinal lesion consistent w/ candida. Patient will need serial ophtho exams to help determine final duration of therapy.  - follow  up in ID clinic in 2 weeks     - Drains: maintain, IR drain (Cr 0.7), NTs  - Prophylaxis: IS, SCDs, GI ppx        VTE Risk Mitigation (From admission, onward)         Ordered     heparin (porcine) injection 5,000 Units  Every 8 hours      09/29/20 0553     IP VTE HIGH RISK PATIENT  Once      09/11/20 2024     Place sequential compression device  Until discontinued      09/11/20 2024                Burton Gallardo MD  Urology  [facility]

## 2020-10-02 NOTE — SUBJECTIVE & OBJECTIVE
Interval History 10/2/2020:  Patient is seen for follow-up PM&R evaluation and recommendations: Participating w/ therapy.    HPI, Past Medical, Family, and Social History remains the same as documented in the initial encounter.    Scheduled Medications:    ceFEPime (MAXIPIME) IVPB  1 g Intravenous Q8H    cyanocobalamin  100 mcg Intramuscular Weekly    epoetin yecenia-epbx  20,000 Units Subcutaneous Q48H    fluconazole  400 mg Oral Daily    heparin (porcine)  5,000 Units Subcutaneous Q8H    loperamide  2 mg Oral BID    metoprolol tartrate  25 mg Oral BID    metroNIDAZOLE  500 mg Oral Q8H    pantoprazole  40 mg Intravenous Daily    psyllium husk (aspartame)  1 packet Oral Daily    sodium chloride 0.9%  10 mL Intravenous Q6H       Diagnostic Results: Labs: Reviewed    PRN Medications: acetaminophen, dextrose 50%, glucagon (human recombinant), HYDROmorphone, insulin aspart U-100, iohexol, iohexol, labetalol, ondansetron, oxyCODONE-acetaminophen, oxyCODONE-acetaminophen, sodium chloride 0.9%, Flushing PICC Protocol **AND** sodium chloride 0.9% **AND** sodium chloride 0.9%, sodium chloride 0.9%    Review of Systems   Constitutional: Positive for activity change.   Respiratory: Negative for shortness of breath.    Cardiovascular: Negative for chest pain.   Genitourinary: Positive for difficulty urinating.   Musculoskeletal: Positive for gait problem.   Neurological: Positive for facial asymmetry, speech difficulty and weakness.     Objective:     Vital Signs (Most Recent):  Temp: 97.5 °F (36.4 °C) (10/02/20 0800)  Pulse: 106 (10/02/20 0800)  Resp: 20 (10/02/20 0800)  BP: 132/71 (10/02/20 0800)  SpO2: 96 % (10/02/20 0800)    Vital Signs (24h Range):  Temp:  [96.6 °F (35.9 °C)-98.4 °F (36.9 °C)] 97.5 °F (36.4 °C)  Pulse:  [] 106  Resp:  [18-20] 20  SpO2:  [95 %-97 %] 96 %  BP: (120-132)/(64-84) 132/71     Physical Exam  Vitals signs reviewed.   Constitutional:       General: She is not in acute distress.      Appearance: She is well-developed.      Comments: sitting [facility]   HENT:      Head: Normocephalic and atraumatic.   Eyes:      General:         Right eye: No discharge.         Left eye: No discharge.   Neck:      Musculoskeletal: Neck supple.   Cardiovascular:      Rate and Rhythm: Normal rate.      Pulses: Normal pulses.   Pulmonary:      Effort: Pulmonary effort is normal. No respiratory distress.   Abdominal:      Palpations: Abdomen is soft.      Tenderness: There is no abdominal tenderness.      Comments: Ileostomy in place      Musculoskeletal:         General: No deformity.   Skin:     General: Skin is warm and dry.   Neurological:      Mental Status: She is alert and oriented to person, place, and time.      Cranial Nerves: Dysarthria and facial asymmetry present.      Motor: Weakness present.      Comments: Follows commands    Psychiatric:         Behavior: Behavior normal.         Cognition and Memory: Cognition is not impaired.       NEUROLOGICAL EXAMINATION:     MENTAL STATUS   Oriented to person, place, and time.

## 2020-10-02 NOTE — ASSESSMENT & PLAN NOTE
S/p urinary diversion with colon conduit 9/11   S/p emergent ex-lap 9/17, colo-colic anastomosis intact, bowel perforation found, underwent resection of right colon, remaining transverses colon, and proximal descending colon, ileostomy creation, Charlotte's pouch created     - CRS primary  - regular diet per Primary  - nephrostomy tubes removed 10/1 - Cr stable, good UOP from ostomy  - continue PT/OT, OOBTC as tolerated, encourage ambulation  - Wound care ostomy consulted for assistance with ostomy teaching   - patient is a Oriental orthodox; management per primary and Heme-Onc (on EPO, B12, and iron)  - agree with ID recommendations: PICC line DC'd 9/28, cefepime for Enterobacter and flagyl for anaerobes  - 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 Repeat sooner if patient develops clinical change. Continue cefepime and flagyl until repeat CT, continue fluconazole 400mg daily for minimum of 4 weeks for retinal lesion consistent w/ candida. Patient will need serial ophtho exams to help determine final duration of therapy.  - follow up in ID clinic in 2 weeks     - Drains: maintain, IR drain (Cr 0.7), NTs  - Prophylaxis: IS, SCDs, GI ppx

## 2020-10-02 NOTE — PROGRESS NOTES
[facility]  Colorectal Surgery  Progress Note    Patient Name: Edita Riley  MRN: [id number]  Admission Date: 9/11/2020  Hospital Length of Stay: 21 days  Attending Physician: Hammad Reynolds MD    Subjective:     Interval History: Patient seen and examined this am. Doing well. Tolerating diet. Ostomy and urostomy in place with good output. Working on authorization to rehab. No new complaints at this time.     Post-Op Info:  Procedure(s) (LRB):  Nephrostogram - antegrade (Bilateral)  REMOVAL, STENT, URETER (Bilateral)   4 Days Post-Op      Medications:  Continuous Infusions:   electrolyte-A 1,000 mL (10/01/20 1201)     Scheduled Meds:   ceFEPime (MAXIPIME) IVPB  1 g Intravenous Q8H    cyanocobalamin  100 mcg Intramuscular Weekly    epoetin yecenia-epbx  20,000 Units Subcutaneous Q48H    fluconazole  400 mg Oral Daily    heparin (porcine)  5,000 Units Subcutaneous Q8H    loperamide  2 mg Oral BID    metoprolol tartrate  25 mg Oral BID    metroNIDAZOLE  500 mg Oral Q8H    pantoprazole  40 mg Intravenous Daily    psyllium husk (aspartame)  1 packet Oral Daily    sodium chloride 0.9%  10 mL Intravenous Q6H     PRN Meds:   acetaminophen    dextrose 50%    glucagon (human recombinant)    HYDROmorphone    insulin aspart U-100    iohexol    iohexol    labetalol    ondansetron    oxyCODONE-acetaminophen    oxyCODONE-acetaminophen    sodium chloride 0.9%    sodium chloride 0.9%    sodium chloride 0.9%        Objective:     Vital Signs (Most Recent):  Temp: 97.5 °F (36.4 °C) (10/02/20 0800)  Pulse: 106 (10/02/20 0800)  Resp: 20 (10/02/20 0800)  BP: 132/71 (10/02/20 0800)  SpO2: 96 % (10/02/20 0800) Vital Signs (24h Range):  Temp:  [96.6 °F (35.9 °C)-98.4 °F (36.9 °C)] 97.5 °F (36.4 °C)  Pulse:  [] 106  Resp:  [18-20] 20  SpO2:  [95 %-97 %] 96 %  BP: (120-132)/(64-84) 132/71     Intake/Output - Last 3 Shifts       09/30 0700 - 10/01 0659 10/01 0700 - 10/02 0659 10/02  0700 - 10/03 0659    P.O. 1626 250     I.V. (mL/kg) 1200 (13.5) 1075 (12.1)     Total Intake(mL/kg) 2826 (31.8) 1325 (14.9)     Urine (mL/kg/hr) 1200 (0.6) 1850 (0.9)     Emesis/NG output  0     Drains 10 0     Other 0 0     Stool 200 70     Total Output 1410 1920     Net +[phone] -[phone]                  Physical Exam  Vitals signs and nursing note reviewed.   Constitutional:       Appearance: She is well-developed. She is not ill-appearing.   HENT:      Head: Normocephalic.   Eyes:      Pupils: Pupils are equal, round, and reactive to light.   Cardiovascular:      Rate and Rhythm: Normal rate and regular rhythm.      Heart sounds: Normal heart sounds.   Pulmonary:      Effort: Pulmonary effort is normal. No respiratory distress.      Breath sounds: Normal breath sounds. No wheezing or rales.   Abdominal:      Palpations: Abdomen is soft. There is no mass.      Tenderness: There is no guarding or rebound.      Comments: Ileostomy in place to RLQ with gas and stool within ostomy bag.  Urostomy bag in place to LLQ with clear, yellow urine  Midline wound vac remains in place with excellent seal  RLQ IR drain in place   Musculoskeletal: Normal range of motion.   Skin:     General: Skin is warm and dry.   Neurological:      Mental Status: She is alert and oriented to person, place, and time.   Psychiatric:         Behavior: Behavior normal.         Thought Content: Thought content normal.         Judgment: Judgment normal.         Significant Labs:  BMP (Last 3 Results):   Recent Labs   Lab 09/30/20  0424 10/01/20  0320 10/02/20  0353   * 122* 106    141 142   K 4.4 3.9 3.8    101 102   CO2 22* 28 32*   BUN 21* 17 13   CREATININE 1.5* 1.2 1.2   CALCIUM 8.3* 8.3* 8.6*   MG 1.8 1.6 1.8     CBC (Last 3 Results):   Recent Labs   Lab 09/30/20  0424 10/01/20  0320 10/02/20  0353   WBC 12.82* 12.87* 15.02*   RBC 2.47* 2.49* 2.50*   HGB 6.5* 6.6* 6.6*   HCT 22.1* 22.1* 22.4*   * 500* 700*   MCV 90 89 90    MCH 26.3* 26.5* 26.4*   MCHC 29.4* 29.9* 29.5*       Significant Diagnostics:  CT: I have reviewed all pertinent results/findings within the past 24 hours:  interval improvement in RLQ collection    Assessment/Plan:     * Bilateral ureteral obstruction  Ms. Riley is a 55 y.o. female with b/l ureteral obstruction s/p transverse colon conduit on 9/11/2020. Extended R colectomy and ileostomy creation on 9/17.     - low K diet in place; tolerating well  - continue IV antibiotics per ID's recs  - repeat CT A/P showing interval improvement of RLQ collection; dilated small bowel  - ODESSA resolving; BUN/Cr downtrending; 13/1.2 today  - mIVFs electrolyte-A @100mL/hr, per nephro recs; patient to follow-up with nephro in 4 wks  - ileostomy and urostomy in place with appropriate output; wound care on board  - continue to monitor I/Os  -  nephrosotrogram 9/28   - OOB, activity as tolerated  - PT/OT  - SW on board; patient approved for rehab  - patient stable for discharge to rehab; pending authorization            Mark Martinez MD  Colorectal Surgery  [facility]

## 2020-10-02 NOTE — SUBJECTIVE & OBJECTIVE
Subjective:     Interval History: Patient seen and examined this am. Doing well. Tolerating diet. Ostomy and urostomy in place with good output. Working on authorization to rehab. No new complaints at this time.     Post-Op Info:  Procedure(s) (LRB):  Nephrostogram - antegrade (Bilateral)  REMOVAL, STENT, URETER (Bilateral)   4 Days Post-Op      Medications:  Continuous Infusions:   electrolyte-A 1,000 mL (10/01/20 1201)     Scheduled Meds:   ceFEPime (MAXIPIME) IVPB  1 g Intravenous Q8H    cyanocobalamin  100 mcg Intramuscular Weekly    epoetin yecenia-epbx  20,000 Units Subcutaneous Q48H    fluconazole  400 mg Oral Daily    heparin (porcine)  5,000 Units Subcutaneous Q8H    loperamide  2 mg Oral BID    metoprolol tartrate  25 mg Oral BID    metroNIDAZOLE  500 mg Oral Q8H    pantoprazole  40 mg Intravenous Daily    psyllium husk (aspartame)  1 packet Oral Daily    sodium chloride 0.9%  10 mL Intravenous Q6H     PRN Meds:   acetaminophen    dextrose 50%    glucagon (human recombinant)    HYDROmorphone    insulin aspart U-100    iohexol    iohexol    labetalol    ondansetron    oxyCODONE-acetaminophen    oxyCODONE-acetaminophen    sodium chloride 0.9%    sodium chloride 0.9%    sodium chloride 0.9%        Objective:     Vital Signs (Most Recent):  Temp: 97.5 °F (36.4 °C) (10/02/20 0800)  Pulse: 106 (10/02/20 0800)  Resp: 20 (10/02/20 0800)  BP: 132/71 (10/02/20 0800)  SpO2: 96 % (10/02/20 0800) Vital Signs (24h Range):  Temp:  [96.6 °F (35.9 °C)-98.4 °F (36.9 °C)] 97.5 °F (36.4 °C)  Pulse:  [] 106  Resp:  [18-20] 20  SpO2:  [95 %-97 %] 96 %  BP: (120-132)/(64-84) 132/71     Intake/Output - Last 3 Shifts       09/30 0700 - 10/01 0659 10/01 0700 - 10/02 0659 10/02 0700 - 10/03 0659    P.O. 1626 250     I.V. (mL/kg) 1200 (13.5) 1075 (12.1)     Total Intake(mL/kg) 2826 (31.8) 1325 (14.9)     Urine (mL/kg/hr) 1200 (0.6) 1850 (0.9)     Emesis/NG output  0     Drains 10 0     Other 0 0      Stool 200 70     Total Output 1410 1920     Net +1412 -701                  Physical Exam  Vitals signs and nursing note reviewed.   Constitutional:       Appearance: She is well-developed. She is not ill-appearing.   HENT:      Head: Normocephalic.   Eyes:      Pupils: Pupils are equal, round, and reactive to light.   Cardiovascular:      Rate and Rhythm: Normal rate and regular rhythm.      Heart sounds: Normal heart sounds.   Pulmonary:      Effort: Pulmonary effort is normal. No respiratory distress.      Breath sounds: Normal breath sounds. No wheezing or rales.   Abdominal:      Palpations: Abdomen is soft. There is no mass.      Tenderness: There is no guarding or rebound.      Comments: Ileostomy in place to RLQ with gas and stool within ostomy bag.  Urostomy bag in place to LLQ with clear, yellow urine  Midline wound vac remains in place with excellent seal  RLQ IR drain in place   Musculoskeletal: Normal range of motion.   Skin:     General: Skin is warm and dry.   Neurological:      Mental Status: She is alert and oriented to person, place, and time.   Psychiatric:         Behavior: Behavior normal.         Thought Content: Thought content normal.         Judgment: Judgment normal.         Significant Labs:  BMP (Last 3 Results):   Recent Labs   Lab 09/30/20  0424 10/01/20  0320 10/02/20  0353   * 122* 106    141 142   K 4.4 3.9 3.8    101 102   CO2 22* 28 32*   BUN 21* 17 13   CREATININE 1.5* 1.2 1.2   CALCIUM 8.3* 8.3* 8.6*   MG 1.8 1.6 1.8     CBC (Last 3 Results):   Recent Labs   Lab 09/30/20  0424 10/01/20  0320 10/02/20  0353   WBC 12.82* 12.87* 15.02*   RBC 2.47* 2.49* 2.50*   HGB 6.5* 6.6* 6.6*   HCT 22.1* 22.1* 22.4*   * 500* 700*   MCV 90 89 90   MCH 26.3* 26.5* 26.4*   MCHC 29.4* 29.9* 29.5*       Significant Diagnostics:  CT: I have reviewed all pertinent results/findings within the past 24 hours:  interval improvement in RLQ collection

## 2020-10-02 NOTE — PROGRESS NOTES
Wound care follow up:  Wound vac dressing changed as directed.  Less purulent drainage to middle abdomen wound. Will continue with wound vac dressing changes 2x/wk. Ostomy pouches intact.  [id number]

## 2020-10-03 LAB
ALBUMIN SERPL BCP-MCNC: 1.9 G/DL (ref 3.5–5.2)
ALP SERPL-CCNC: 178 U/L (ref 55–135)
ALT SERPL W/O P-5'-P-CCNC: 8 U/L (ref 10–44)
ANION GAP SERPL CALC-SCNC: 13 MMOL/L (ref 8–16)
AST SERPL-CCNC: 18 U/L (ref 10–40)
BASOPHILS # BLD AUTO: 0.04 K/UL (ref 0–0.2)
BASOPHILS NFR BLD: 0.2 % (ref 0–1.9)
BILIRUB SERPL-MCNC: 0.4 MG/DL (ref 0.1–1)
BUN SERPL-MCNC: 12 MG/DL (ref 6–20)
CALCIUM SERPL-MCNC: 8.3 MG/DL (ref 8.7–10.5)
CHLORIDE SERPL-SCNC: 103 MMOL/L (ref 95–110)
CO2 SERPL-SCNC: 27 MMOL/L (ref 23–29)
CREAT SERPL-MCNC: 1.2 MG/DL (ref 0.5–1.4)
CRP SERPL-MCNC: 128.81 MG/L (ref 0–3.19)
CRP SERPL-MCNC: 129.4 MG/L (ref 0–8.2)
DIFFERENTIAL METHOD: ABNORMAL
EOSINOPHIL # BLD AUTO: 0.2 K/UL (ref 0–0.5)
EOSINOPHIL NFR BLD: 1.2 % (ref 0–8)
ERYTHROCYTE [DISTWIDTH] IN BLOOD BY AUTOMATED COUNT: 20 % (ref 11.5–14.5)
EST. GFR  (AFRICAN AMERICAN): 58 ML/MIN/1.73 M^2
EST. GFR  (NON AFRICAN AMERICAN): 50.3 ML/MIN/1.73 M^2
GLUCOSE SERPL-MCNC: 102 MG/DL (ref 70–110)
HCT VFR BLD AUTO: 24.6 % (ref 37–48.5)
HGB BLD-MCNC: 7 G/DL (ref 12–16)
IMM GRANULOCYTES # BLD AUTO: 0.14 K/UL (ref 0–0.04)
IMM GRANULOCYTES NFR BLD AUTO: 0.8 % (ref 0–0.5)
LYMPHOCYTES # BLD AUTO: 1.2 K/UL (ref 1–4.8)
LYMPHOCYTES NFR BLD: 6.9 % (ref 18–48)
MAGNESIUM SERPL-MCNC: 1.7 MG/DL (ref 1.6–2.6)
MCH RBC QN AUTO: 26.6 PG (ref 27–31)
MCHC RBC AUTO-ENTMCNC: 28.5 G/DL (ref 32–36)
MCV RBC AUTO: 94 FL (ref 82–98)
MONOCYTES # BLD AUTO: 0.8 K/UL (ref 0.3–1)
MONOCYTES NFR BLD: 4.7 % (ref 4–15)
NEUTROPHILS # BLD AUTO: 15.3 K/UL (ref 1.8–7.7)
NEUTROPHILS NFR BLD: 86.2 % (ref 38–73)
NRBC BLD-RTO: 1 /100 WBC
PHOSPHATE SERPL-MCNC: 6.4 MG/DL (ref 2.7–4.5)
PLATELET # BLD AUTO: 637 K/UL (ref 150–350)
PMV BLD AUTO: 9.4 FL (ref 9.2–12.9)
POCT GLUCOSE: 117 MG/DL (ref 70–110)
POTASSIUM SERPL-SCNC: 3.6 MMOL/L (ref 3.5–5.1)
PROT SERPL-MCNC: 6.7 G/DL (ref 6–8.4)
RBC # BLD AUTO: 2.63 M/UL (ref 4–5.4)
SODIUM SERPL-SCNC: 143 MMOL/L (ref 136–145)
TRIGL SERPL-MCNC: 183 MG/DL (ref 30–150)
WBC # BLD AUTO: 17.74 K/UL (ref 3.9–12.7)

## 2020-10-03 PROCEDURE — 63600175 PHARM REV CODE 636 W HCPCS: Performed by: STUDENT IN AN ORGANIZED HEALTH CARE EDUCATION/TRAINING PROGRAM

## 2020-10-03 PROCEDURE — 86140 C-REACTIVE PROTEIN: CPT

## 2020-10-03 PROCEDURE — 25000003 PHARM REV CODE 250: Performed by: STUDENT IN AN ORGANIZED HEALTH CARE EDUCATION/TRAINING PROGRAM

## 2020-10-03 PROCEDURE — 25000003 PHARM REV CODE 250: Performed by: INTERNAL MEDICINE

## 2020-10-03 PROCEDURE — A4216 STERILE WATER/SALINE, 10 ML: HCPCS | Performed by: STUDENT IN AN ORGANIZED HEALTH CARE EDUCATION/TRAINING PROGRAM

## 2020-10-03 PROCEDURE — 84478 ASSAY OF TRIGLYCERIDES: CPT

## 2020-10-03 PROCEDURE — 11000001 HC ACUTE MED/SURG PRIVATE ROOM

## 2020-10-03 PROCEDURE — 85025 COMPLETE CBC W/AUTO DIFF WBC: CPT

## 2020-10-03 PROCEDURE — 97530 THERAPEUTIC ACTIVITIES: CPT | Mod: CQ

## 2020-10-03 PROCEDURE — 97110 THERAPEUTIC EXERCISES: CPT | Mod: CQ

## 2020-10-03 PROCEDURE — 83735 ASSAY OF MAGNESIUM: CPT

## 2020-10-03 PROCEDURE — 94761 N-INVAS EAR/PLS OXIMETRY MLT: CPT

## 2020-10-03 PROCEDURE — 80053 COMPREHEN METABOLIC PANEL: CPT

## 2020-10-03 PROCEDURE — 84100 ASSAY OF PHOSPHORUS: CPT

## 2020-10-03 PROCEDURE — 25000003 PHARM REV CODE 250: Performed by: NURSE PRACTITIONER

## 2020-10-03 PROCEDURE — 25000242 PHARM REV CODE 250 ALT 637 W/ HCPCS: Performed by: NURSE PRACTITIONER

## 2020-10-03 PROCEDURE — 97116 GAIT TRAINING THERAPY: CPT | Mod: CQ

## 2020-10-03 PROCEDURE — 99900035 HC TECH TIME PER 15 MIN (STAT)

## 2020-10-03 PROCEDURE — 86141 C-REACTIVE PROTEIN HS: CPT

## 2020-10-03 PROCEDURE — C9113 INJ PANTOPRAZOLE SODIUM, VIA: HCPCS | Performed by: STUDENT IN AN ORGANIZED HEALTH CARE EDUCATION/TRAINING PROGRAM

## 2020-10-03 PROCEDURE — 36415 COLL VENOUS BLD VENIPUNCTURE: CPT

## 2020-10-03 RX ADMIN — PANTOPRAZOLE SODIUM 40 MG: 40 INJECTION, POWDER, LYOPHILIZED, FOR SOLUTION INTRAVENOUS at 09:10

## 2020-10-03 RX ADMIN — HEPARIN SODIUM 5000 UNITS: 5000 INJECTION INTRAVENOUS; SUBCUTANEOUS at 09:10

## 2020-10-03 RX ADMIN — LOPERAMIDE HYDROCHLORIDE 2 MG: 2 CAPSULE ORAL at 09:10

## 2020-10-03 RX ADMIN — Medication 10 ML: at 03:10

## 2020-10-03 RX ADMIN — Medication 10 ML: at 05:10

## 2020-10-03 RX ADMIN — METRONIDAZOLE 500 MG: 500 TABLET ORAL at 06:10

## 2020-10-03 RX ADMIN — CEFEPIME 1 G: 1 INJECTION, POWDER, FOR SOLUTION INTRAMUSCULAR; INTRAVENOUS at 12:10

## 2020-10-03 RX ADMIN — METRONIDAZOLE 500 MG: 500 TABLET ORAL at 03:10

## 2020-10-03 RX ADMIN — CYANOCOBALAMIN 100 MCG: 1000 INJECTION, SOLUTION INTRAMUSCULAR at 09:10

## 2020-10-03 RX ADMIN — PSYLLIUM HUSK 1 PACKET: 3.4 POWDER ORAL at 09:10

## 2020-10-03 RX ADMIN — EPOETIN ALFA-EPBX 20000 UNITS: 10000 INJECTION, SOLUTION INTRAVENOUS; SUBCUTANEOUS at 06:10

## 2020-10-03 RX ADMIN — HEPARIN SODIUM 5000 UNITS: 5000 INJECTION INTRAVENOUS; SUBCUTANEOUS at 06:10

## 2020-10-03 RX ADMIN — METRONIDAZOLE 500 MG: 500 TABLET ORAL at 09:10

## 2020-10-03 RX ADMIN — HEPARIN SODIUM 5000 UNITS: 5000 INJECTION INTRAVENOUS; SUBCUTANEOUS at 02:10

## 2020-10-03 RX ADMIN — CEFEPIME 1 G: 1 INJECTION, POWDER, FOR SOLUTION INTRAMUSCULAR; INTRAVENOUS at 09:10

## 2020-10-03 RX ADMIN — METOPROLOL TARTRATE 25 MG: 25 TABLET, FILM COATED ORAL at 09:10

## 2020-10-03 RX ADMIN — FLUCONAZOLE 400 MG: 200 TABLET ORAL at 09:10

## 2020-10-03 RX ADMIN — ACETAMINOPHEN 650 MG: 325 TABLET ORAL at 03:10

## 2020-10-03 RX ADMIN — Medication 10 ML: at 06:10

## 2020-10-03 RX ADMIN — CEFEPIME 1 G: 1 INJECTION, POWDER, FOR SOLUTION INTRAMUSCULAR; INTRAVENOUS at 05:10

## 2020-10-03 RX ADMIN — Medication 10 ML: at 12:10

## 2020-10-03 NOTE — ASSESSMENT & PLAN NOTE
S/p urinary diversion with colon conduit 9/11   S/p emergent ex-lap 9/17, colo-colic anastomosis intact, bowel perforation found, underwent resection of right colon, remaining transverses colon, and proximal descending colon, ileostomy creation, Charlotte's pouch created     - CRS primary  - regular diet per Primary  -KUB with some dilated small bowel, relatively unrevealing, now tolerating diet  - nephrostomy tubes removed 10/1 - Cr stable, good UOP from ostomy  - continue PT/OT, OOBTC as tolerated, encourage ambulation  - Wound care ostomy consulted for assistance with ostomy teaching   - patient is a Yaz[employer]; management per primary and Heme-Onc (on EPO, B12, and iron)  - agree with ID recommendations: PICC line DC'd 9/28, cefepime for Enterobacter and flagyl for anaerobes  - 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 Repeat sooner if patient develops clinical change. Continue cefepime and flagyl until repeat CT, continue fluconazole 400mg daily for minimum of 4 weeks for retinal lesion consistent w/ candida. Patient will need serial ophtho exams to help determine final duration of therapy.  - follow up in ID clinic in 2 weeks     - Drains: maintain, IR drain (Cr 0.7), NTs  - Prophylaxis: IS, SCDs, GI ppx

## 2020-10-03 NOTE — PT/OT/SLP PROGRESS
Physical Therapy Treatment    Patient Name:  Edita Riley   MRN:  [id number]    Recommendations:     Discharge Recommendations:  rehabilitation facility   Discharge Equipment Recommendations: bath bench, walker, rolling   Barriers to discharge: Inaccessible home and Decreased caregiver support    Assessment:     Edita Riley is a 55 y.o. female admitted with a medical diagnosis of Bilateral ureteral obstruction.  She presents with the following impairments/functional limitations:  weakness, impaired endurance, impaired self care skills, impaired functional mobilty, gait instability, impaired balance, decreased coordination, decreased safety awareness, decreased ROM, impaired skin, impaired cardiopulmonary response to activity . Patient ambulates with decreased karma, base of support and min unsteadiness. Patient also needs cues for safety awareness, as she makes too sharp turns.    Rehab Prognosis: Good; patient would benefit from acute skilled PT services to address these deficits and reach maximum level of function.    Recent Surgery: Procedure(s) (LRB):  Nephrostogram - antegrade (Bilateral)  REMOVAL, STENT, URETER (Bilateral) 5 Days Post-Op    Plan:     During this hospitalization, patient to be seen 4 x/week to address the identified rehab impairments via gait training, therapeutic activities, therapeutic exercises, neuromuscular re-education and progress toward the following goals:    · Plan of Care Expires:  11/21/20    Subjective     Chief Complaint: stiffness  Patient/Family Comments/goals: to go to Rehab  Pain/Comfort:  · Pain Rating 1: 0/10  · Location 1: abdomen  · Pain Rating Post-Intervention 1: 0/10      Objective:     Communicated with NSG prior to session.  Patient found HOB elevated with colostomy, ORESTES drain, peripheral IV, wound vac upon PT entry to room.     General Precautions: Standard, fall   Orthopedic Precautions:N/A   Braces: N/A     Functional Mobility:  · Bed  Mobility:     · Rolling Right: stand by assistance  · Scooting: stand by assistance  · Supine to Sit: contact guard assistance and minimum assistance  · Transfers:     · Sit to Stand:  contact guard assistance with rolling walker  · Bed to Chair: contact guard assistance with  rolling walker  using  Stand Pivot  · Gait: 28 ft and 110 ft with RW and CGA, with chair follow and IV pole and Wound vac in tow, with a long rest break sitting between trials.      AM-PAC 6 CLICK MOBILITY  Turning over in bed (including adjusting bedclothes, sheets and blankets)?: 3  Sitting down on and standing up from a chair with arms (e.g., wheelchair, bedside commode, etc.): 3  Moving from lying on back to sitting on the side of the bed?: 3  Moving to and from a bed to a chair (including a wheelchair)?: 3  Need to walk in hospital room?: 3  Climbing 3-5 steps with a railing?: 2  Basic Mobility Total Score: 17       Therapeutic Activities and Exercises:   Donned a second gown. Assisted to bedside chair. There ex in sitting: LAQ, HIP FLEX, HIP ABD AND HEEL/TOE RAISES 2X15 REPS B LE.    Patient left up in chair with all lines intact, call button in reach and NSG notified..    GOALS:   Multidisciplinary Problems     Physical Therapy Goals        Problem: Physical Therapy Goal    Goal Priority Disciplines Outcome Goal Variances Interventions   Physical Therapy Goal     PT, PT/OT Ongoing, Progressing     Description: Patient re-evaluated 20 s/p further surgery.  Goals to be met by: 10/14/2020    Patient will increase functional independence with mobility by performin. Supine to sit with Set-up [place]  2. Sit to supine with Set-up [place]  3. Sit to stand transfer with Supervision  4. Bed to chair transfer with Supervision using Rolling Walker  5. Gait  x 250 feet with Stand-by Assistance using Rolling Walker.   6. Ascend/descend 3 stair with left Handrails Stand-by Assistance                   Time Tracking:     PT  Received On: 10/03/20  PT Start Time: 0955     PT Stop Time: 1035  PT Total Time (min): 40 min     Billable Minutes: Gait Training 20, Therapeutic Activity 10 and Therapeutic Exercise 10    Treatment Type: Treatment  PT/PTA: PTA     PTA Visit Number: 1     Juve Acosta, CARMEN  10/03/2020

## 2020-10-03 NOTE — PROGRESS NOTES
[facility]  Colorectal Surgery  Progress Note    Patient Name: Edita Riley  MRN: [id number]  Admission Date: 9/11/2020  Hospital Length of Stay: 22 days  Attending Physician: Hammad Reynolds MD    Subjective:     Interval History: Patient seen and examined this am. No acute events overnight. Patient with increasing WBC-- remains afebrile. Possible hemoconcentration. No new complaints at this time. To discuss drain study versus new drain placement with IR.     Post-Op Info:  Procedure(s) (LRB):  Nephrostogram - antegrade (Bilateral)  REMOVAL, STENT, URETER (Bilateral)   5 Days Post-Op      Medications:  Continuous Infusions:   electrolyte-A 1,000 mL (10/01/20 1201)     Scheduled Meds:   ceFEPime (MAXIPIME) IVPB  1 g Intravenous Q8H    cyanocobalamin  100 mcg Intramuscular Weekly    epoetin yecenia-epbx  20,000 Units Subcutaneous Q48H    fluconazole  400 mg Oral Daily    heparin (porcine)  5,000 Units Subcutaneous Q8H    loperamide  2 mg Oral BID    metoprolol tartrate  25 mg Oral BID    metroNIDAZOLE  500 mg Oral Q8H    pantoprazole  40 mg Intravenous Daily    psyllium husk (aspartame)  1 packet Oral Daily    sodium chloride 0.9%  10 mL Intravenous Q6H     PRN Meds:   acetaminophen    dextrose 50%    glucagon (human recombinant)    HYDROmorphone    insulin aspart U-100    iohexol    iohexol    labetalol    ondansetron    oxyCODONE-acetaminophen    oxyCODONE-acetaminophen    sodium chloride 0.9%    sodium chloride 0.9%    sodium chloride 0.9%        Objective:     Vital Signs (Most Recent):  Temp: 97.4 °F (36.3 °C) (10/03/20 0400)  Pulse: 110 (10/03/20 0400)  Resp: 18 (10/03/20 0400)  BP: (!) 141/70 (10/03/20 0400)  SpO2: 97 % (10/03/20 0400) Vital Signs (24h Range):  Temp:  [96.6 °F (35.9 °C)-98.1 °F (36.7 °C)] 97.4 °F (36.3 °C)  Pulse:  [] 110  Resp:  [16-20] 18  SpO2:  [96 %-99 %] 97 %  BP: (126-141)/(61-70) 141/70     Intake/Output - Last 3 Shifts        10/01 0700 - 10/02 0659 10/02 0700 - 10/03 0659 10/03 0700 - 10/04 0659    P.O. 250 1066     I.V. (mL/kg) 1075 (12.1) 900 (10.1)     Total Intake(mL/kg) 1325 (14.9) 1966 (22.1)     Urine (mL/kg/hr) 1850 (0.9) 1250 (0.6)     Emesis/NG output 0 0     Drains 0 0     Other 0 0     Stool 70 0     Total Output 1920 1250     Net -595 +716                  Physical Exam  Vitals signs and nursing note reviewed.   Constitutional:       Appearance: She is well-developed. She is not ill-appearing.   HENT:      Head: Normocephalic.   Eyes:      Pupils: Pupils are equal, round, and reactive to light.   Cardiovascular:      Rate and Rhythm: Normal rate and regular rhythm.      Heart sounds: Normal heart sounds.   Pulmonary:      Effort: Pulmonary effort is normal. No respiratory distress.      Breath sounds: Normal breath sounds. No wheezing or rales.   Abdominal:      Palpations: Abdomen is soft. There is no mass.      Tenderness: There is no guarding or rebound.      Comments: Ileostomy in place to RLQ with gas and stool within ostomy bag.  Urostomy bag in place to LLQ with clear, yellow urine  Midline wound vac remains in place with excellent seal  RLQ IR drain in place   Musculoskeletal: Normal range of motion.   Skin:     General: Skin is warm and dry.   Neurological:      Mental Status: She is alert and oriented to person, place, and time.   Psychiatric:         Behavior: Behavior normal.         Thought Content: Thought content normal.         Judgment: Judgment normal.           Significant Labs:  CBC:   Recent Labs   Lab 10/03/20  0308   WBC 17.74*   RBC 2.63*   HGB 7.0*   HCT 24.6*   *   MCV 94   MCH 26.6*   MCHC 28.5*     CMP:   Recent Labs   Lab 10/03/20  0308      CALCIUM 8.3*   ALBUMIN 1.9*   PROT 6.7      K 3.6   CO2 27      BUN 12   CREATININE 1.2   ALKPHOS 178*   ALT 8*   AST 18   BILITOT 0.4       Significant Diagnostics:  I have reviewed all pertinent imaging results/findings within the  past 24 hours.    Assessment/Plan:     * Bilateral ureteral obstruction  Ms. Riley is a 55 y.o. female with b/l ureteral obstruction s/p transverse colon conduit on 9/11/2020. Extended R colectomy and ileostomy creation on 9/17.     - low K diet in place; tolerating well  - continue IV antibiotics per ID's recs  - monitor CBC, CMP  - increasing leukocytosis; 17 <-- 15 <-- 12; remains afebrile  - trend CRPs  - to discuss drain study with IR vs new drain placement for persistent RLQ collection  - ODESSA resolving; BUN/Cr downtrending; 12/1.2 today  - mIVFs electrolyte-A @100mL/hr, per nephro recs; patient to follow-up with nephro in 4 wks  - ileostomy and urostomy in place with appropriate output  - continue to monitor I/Os  -  nephrosotrogram 9/28   - OOB, activity as tolerated  - PT/OT  - SW on board; patient approved for rehab  - patient stable for discharge to rehab; pending authorization          Mark Martinez MD  Colorectal Surgery  [facility]

## 2020-10-03 NOTE — PROGRESS NOTES
[facility]  Urology  Progress Note    Patient Name: Edita Riley  MRN: [id number]  Admission Date: 9/11/2020  Hospital Length of Stay: 22 days  Code Status: Full Code   Attending Provider: Hammad Reynolds MD   Primary Care Physician: Audrey Mustafa MD    Subjective:     HPI:  Edita Riley is a 55 y.o. female with a history of cervical cancer s/p pelvic radiation. She has since developed bilateral ureteral strictures and bilateral hydronephrosis R>L. She had a MAG3 scan confirmed presence of bilateral obstruction. She is s/p open transverse colon conduit urinary diversion on 9/11/2020.     Interval History:   NAEON  Good output from ileal conduit  Cr stable  Episode of emesis yesterday KUB relatively unrevealing, has now tolerated PO since  Pain controlled, ambulating, tolerating diet    Review of Systems    Objective:     Temp:  [96.6 °F (35.9 °C)-98.1 °F (36.7 °C)] 97.4 °F (36.3 °C)  Pulse:  [] 110  Resp:  [16-20] 18  SpO2:  [96 %-99 %] 97 %  BP: (126-141)/(61-70) 141/70     Body mass index is 28.94 kg/m².           Drains     Drain                 Urostomy 09/11/20 ileal conduit LLQ 21 days         Ileostomy 09/18/20 RLQ 14 days         Closed/Suction Drain 09/23/20 0720 Right Abdomen Bulb 10 Fr. 8 days         Nephrostomy 09/28/20 1210 Left 12 Fr. 3 days         Nephrostomy 09/28/20 1210 Right 12 Fr. 3 days                Physical Exam   Constitutional: No distress.   HENT:   Head: Normocephalic and atraumatic.   Eyes: Conjunctivae are normal. No scleral icterus.   Neck: Normal range of motion.   Cardiovascular: Normal rate.    Pulmonary/Chest: Effort normal. No respiratory distress.   Abdominal: Soft. She exhibits no distension. There is no abdominal tenderness. There is no rebound and no guarding.   Urostomy p/p/p draining clear yellow urine  Ileostomy output thickened   IR drain LLQ draining light yellow clear fluid  Midline incision with wound vac and packing  in place    Musculoskeletal: Normal range of motion.      Comments: SCDs in place   Neurological: She is alert.   Skin: Skin is warm and dry. She is not diaphoretic.         Significant Labs:    BMP:  Recent Labs   Lab 10/01/20  0320 10/02/20  0353 10/03/20  0308    142 143   K 3.9 3.8 3.6    102 103   CO2 28 32* 27   BUN 17 13 12   CREATININE 1.2 1.2 1.2   CALCIUM 8.3* 8.6* 8.3*       CBC:   Recent Labs   Lab 10/01/20  0320 10/02/20  0353 10/03/20  0308   WBC 12.87* 15.02* 17.74*   HGB 6.6* 6.6* 7.0*   HCT 22.1* 22.4* 24.6*   * 700* 637*       All pertinent labs results from the past 24 hours have been reviewed.    Significant Imaging:  All pertinent imaging results/findings from the past 24 hours have been reviewed.                  Assessment/Plan:     * Bilateral ureteral obstruction  S/p urinary diversion with colon conduit 9/11   S/p emergent ex-lap 9/17, colo-colic anastomosis intact, bowel perforation found, underwent resection of right colon, remaining transverses colon, and proximal descending colon, ileostomy creation, Charlotte's pouch created     - CRS primary  - regular diet per Primary  -KUB with some dilated small bowel, relatively unrevealing, now tolerating diet  - nephrostomy tubes removed 10/1 - Cr stable, good UOP from ostomy  - continue PT/OT, OOBTC as tolerated, encourage ambulation  - Wound care ostomy consulted for assistance with ostomy teaching   - patient is a [employer]; management per primary and Heme-Onc (on EPO, B12, and iron)  - agree with ID recommendations: PICC line DC'd 9/28, cefepime for Enterobacter and flagyl for anaerobes  - 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 Repeat sooner if patient develops clinical change. Continue cefepime and flagyl until repeat CT, continue fluconazole 400mg daily for minimum of 4 weeks for retinal  lesion consistent w/ candida. Patient will need serial ophtho exams to help determine final duration of therapy.  - follow up in ID clinic in 2 weeks     - Drains: maintain, IR drain (Cr 0.7), NTs  - Prophylaxis: IS, SCDs, GI ppx        VTE Risk Mitigation (From admission, onward)         Ordered     heparin (porcine) injection 5,000 Units  Every 8 hours      09/29/20 0553     IP VTE HIGH RISK PATIENT  Once      09/11/20 2024     Place sequential compression device  Until discontinued      09/11/20 2024                Julius Villegas MD  Urology  [facility]

## 2020-10-03 NOTE — SUBJECTIVE & OBJECTIVE
Subjective:     Interval History: Patient seen and examined this am. No acute events overnight. Patient with increasing WBC-- remains afebrile. Possible hemoconcentration. No new complaints at this time. To discuss drain study versus new drain placement with IR.     Post-Op Info:  Procedure(s) (LRB):  Nephrostogram - antegrade (Bilateral)  REMOVAL, STENT, URETER (Bilateral)   5 Days Post-Op      Medications:  Continuous Infusions:   electrolyte-A 1,000 mL (10/01/20 1201)     Scheduled Meds:   ceFEPime (MAXIPIME) IVPB  1 g Intravenous Q8H    cyanocobalamin  100 mcg Intramuscular Weekly    epoetin yecenia-epbx  20,000 Units Subcutaneous Q48H    fluconazole  400 mg Oral Daily    heparin (porcine)  5,000 Units Subcutaneous Q8H    loperamide  2 mg Oral BID    metoprolol tartrate  25 mg Oral BID    metroNIDAZOLE  500 mg Oral Q8H    pantoprazole  40 mg Intravenous Daily    psyllium husk (aspartame)  1 packet Oral Daily    sodium chloride 0.9%  10 mL Intravenous Q6H     PRN Meds:   acetaminophen    dextrose 50%    glucagon (human recombinant)    HYDROmorphone    insulin aspart U-100    iohexol    iohexol    labetalol    ondansetron    oxyCODONE-acetaminophen    oxyCODONE-acetaminophen    sodium chloride 0.9%    sodium chloride 0.9%    sodium chloride 0.9%        Objective:     Vital Signs (Most Recent):  Temp: 97.4 °F (36.3 °C) (10/03/20 0400)  Pulse: 110 (10/03/20 0400)  Resp: 18 (10/03/20 0400)  BP: (!) 141/70 (10/03/20 0400)  SpO2: 97 % (10/03/20 0400) Vital Signs (24h Range):  Temp:  [96.6 °F (35.9 °C)-98.1 °F (36.7 °C)] 97.4 °F (36.3 °C)  Pulse:  [] 110  Resp:  [16-20] 18  SpO2:  [96 %-99 %] 97 %  BP: (126-141)/(61-70) 141/70     Intake/Output - Last 3 Shifts       10/01 0700 - 10/02 0659 10/02 0700 - 10/03 0659 10/03 0700 - 10/04 0659    P.O. 250 1066     I.V. (mL/kg) 1075 (12.1) 900 (10.1)     Total Intake(mL/kg) 1325 (14.9) 1966 (22.1)     Urine (mL/kg/hr) 1850 (0.9) 1250 (0.6)      Emesis/NG output 0 0     Drains 0 0     Other 0 0     Stool 70 0     Total Output 1920 1250     Net -595 +716                  Physical Exam  Vitals signs and nursing note reviewed.   Constitutional:       Appearance: She is well-developed. She is not ill-appearing.   HENT:      Head: Normocephalic.   Eyes:      Pupils: Pupils are equal, round, and reactive to light.   Cardiovascular:      Rate and Rhythm: Normal rate and regular rhythm.      Heart sounds: Normal heart sounds.   Pulmonary:      Effort: Pulmonary effort is normal. No respiratory distress.      Breath sounds: Normal breath sounds. No wheezing or rales.   Abdominal:      Palpations: Abdomen is soft. There is no mass.      Tenderness: There is no guarding or rebound.      Comments: Ileostomy in place to RLQ with gas and stool within ostomy bag.  Urostomy bag in place to LLQ with clear, yellow urine  Midline wound vac remains in place with excellent seal  RLQ IR drain in place   Musculoskeletal: Normal range of motion.   Skin:     General: Skin is warm and dry.   Neurological:      Mental Status: She is alert and oriented to person, place, and time.   Psychiatric:         Behavior: Behavior normal.         Thought Content: Thought content normal.         Judgment: Judgment normal.           Significant Labs:  CBC:   Recent Labs   Lab 10/03/20  0308   WBC 17.74*   RBC 2.63*   HGB 7.0*   HCT 24.6*   *   MCV 94   MCH 26.6*   MCHC 28.5*     CMP:   Recent Labs   Lab 10/03/20  0308      CALCIUM 8.3*   ALBUMIN 1.9*   PROT 6.7      K 3.6   CO2 27      BUN 12   CREATININE 1.2   ALKPHOS 178*   ALT 8*   AST 18   BILITOT 0.4       Significant Diagnostics:  I have reviewed all pertinent imaging results/findings within the past 24 hours.

## 2020-10-03 NOTE — SUBJECTIVE & OBJECTIVE
Interval History:   NAEON  Good output from ileal conduit  Cr stable  Episode of emesis yesterday KUB relatively unrevealing, has now tolerated PO since  Pain controlled, ambulating, tolerating diet    Review of Systems    Objective:     Temp:  [96.6 °F (35.9 °C)-98.1 °F (36.7 °C)] 97.4 °F (36.3 °C)  Pulse:  [] 110  Resp:  [16-20] 18  SpO2:  [96 %-99 %] 97 %  BP: (126-141)/(61-70) 141/70     Body mass index is 28.94 kg/m².           Drains     Drain                 Urostomy 09/11/20 ileal conduit LLQ 21 days         Ileostomy 09/18/20 RLQ 14 days         Closed/Suction Drain 09/23/20 0720 Right Abdomen Bulb 10 Fr. 8 days         Nephrostomy 09/28/20 1210 Left 12 Fr. 3 days         Nephrostomy 09/28/20 1210 Right 12 Fr. 3 days                Physical Exam   Constitutional: No distress.   HENT:   Head: Normocephalic and atraumatic.   Eyes: Conjunctivae are normal. No scleral icterus.   Neck: Normal range of motion.   Cardiovascular: Normal rate.    Pulmonary/Chest: Effort normal. No respiratory distress.   Abdominal: Soft. She exhibits no distension. There is no abdominal tenderness. There is no rebound and no guarding.   Urostomy p/p/p draining clear yellow urine  Ileostomy output thickened   IR drain LLQ draining light yellow clear fluid  Midline incision with wound vac and packing in place    Musculoskeletal: Normal range of motion.      Comments: SCDs in place   Neurological: She is alert.   Skin: Skin is warm and dry. She is not diaphoretic.         Significant Labs:    BMP:  Recent Labs   Lab 10/01/20  0320 10/02/20  0353 10/03/20  0308    142 143   K 3.9 3.8 3.6    102 103   CO2 28 32* 27   BUN 17 13 12   CREATININE 1.2 1.2 1.2   CALCIUM 8.3* 8.6* 8.3*       CBC:   Recent Labs   Lab 10/01/20  0320 10/02/20  0353 10/03/20  0308   WBC 12.87* 15.02* 17.74*   HGB 6.6* 6.6* 7.0*   HCT 22.1* 22.4* 24.6*   * 700* 637*       All pertinent labs results from the past 24 hours have been  reviewed.    Significant Imaging:  All pertinent imaging results/findings from the past 24 hours have been reviewed.

## 2020-10-03 NOTE — ASSESSMENT & PLAN NOTE
Ms. Riley is a 55 y.o. female with b/l ureteral obstruction s/p transverse colon conduit on 9/11/2020. Extended R colectomy and ileostomy creation on 9/17.     - low K diet in place; tolerating well  - continue IV antibiotics per ID's recs  - increasing leukocytosis; 17 <-- 15 <-- 12; remains afebrile  - trend CRPs  - to discuss drain study with IR vs new drain placement for persistent RLQ collection  - ODESSA resolving; BUN/Cr downtrending; 12/1.2 today  - mIVFs electrolyte-A @100mL/hr, per nephro recs; patient to follow-up with nephro in 4 wks  - ileostomy and urostomy in place with appropriate output  - continue to monitor I/Os  -  nephrosotrogram 9/28   - OOB, activity as tolerated  - PT/OT  - SW on board; patient approved for rehab  - patient stable for discharge to rehab; pending authorization

## 2020-10-04 LAB
ALBUMIN SERPL BCP-MCNC: 1.8 G/DL (ref 3.5–5.2)
ALP SERPL-CCNC: 137 U/L (ref 55–135)
ALT SERPL W/O P-5'-P-CCNC: 8 U/L (ref 10–44)
ANION GAP SERPL CALC-SCNC: 11 MMOL/L (ref 8–16)
AST SERPL-CCNC: 10 U/L (ref 10–40)
BACTERIA BLD CULT: NORMAL
BACTERIA BLD CULT: NORMAL
BASOPHILS # BLD AUTO: 0.02 K/UL (ref 0–0.2)
BASOPHILS NFR BLD: 0.2 % (ref 0–1.9)
BILIRUB SERPL-MCNC: 0.3 MG/DL (ref 0.1–1)
BUN SERPL-MCNC: 12 MG/DL (ref 6–20)
CALCIUM SERPL-MCNC: 8.4 MG/DL (ref 8.7–10.5)
CHLORIDE SERPL-SCNC: 103 MMOL/L (ref 95–110)
CO2 SERPL-SCNC: 27 MMOL/L (ref 23–29)
CREAT SERPL-MCNC: 0.9 MG/DL (ref 0.5–1.4)
CRP SERPL-MCNC: 112.6 MG/L (ref 0–8.2)
DIFFERENTIAL METHOD: ABNORMAL
EOSINOPHIL # BLD AUTO: 0.3 K/UL (ref 0–0.5)
EOSINOPHIL NFR BLD: 2.5 % (ref 0–8)
ERYTHROCYTE [DISTWIDTH] IN BLOOD BY AUTOMATED COUNT: 19.9 % (ref 11.5–14.5)
EST. GFR  (AFRICAN AMERICAN): >60 ML/MIN/1.73 M^2
EST. GFR  (NON AFRICAN AMERICAN): >60 ML/MIN/1.73 M^2
FUNGAL SUSC PNL ISLT: ABNORMAL
GLUCOSE SERPL-MCNC: 103 MG/DL (ref 70–110)
HCT VFR BLD AUTO: 24.3 % (ref 37–48.5)
HGB BLD-MCNC: 7.2 G/DL (ref 12–16)
IMM GRANULOCYTES # BLD AUTO: 0.08 K/UL (ref 0–0.04)
IMM GRANULOCYTES NFR BLD AUTO: 0.8 % (ref 0–0.5)
LYMPHOCYTES # BLD AUTO: 1 K/UL (ref 1–4.8)
LYMPHOCYTES NFR BLD: 9.5 % (ref 18–48)
MAGNESIUM SERPL-MCNC: 1.7 MG/DL (ref 1.6–2.6)
MCH RBC QN AUTO: 27.7 PG (ref 27–31)
MCHC RBC AUTO-ENTMCNC: 29.6 G/DL (ref 32–36)
MCV RBC AUTO: 94 FL (ref 82–98)
MONOCYTES # BLD AUTO: 0.7 K/UL (ref 0.3–1)
MONOCYTES NFR BLD: 6.8 % (ref 4–15)
NEUTROPHILS # BLD AUTO: 8.6 K/UL (ref 1.8–7.7)
NEUTROPHILS NFR BLD: 80.2 % (ref 38–73)
NRBC BLD-RTO: 1 /100 WBC
PHOSPHATE SERPL-MCNC: 2.6 MG/DL (ref 2.7–4.5)
PLATELET # BLD AUTO: 484 K/UL (ref 150–350)
PMV BLD AUTO: 9.3 FL (ref 9.2–12.9)
POCT GLUCOSE: 110 MG/DL (ref 70–110)
POTASSIUM SERPL-SCNC: 3.4 MMOL/L (ref 3.5–5.1)
PROT SERPL-MCNC: 6.4 G/DL (ref 6–8.4)
RBC # BLD AUTO: 2.6 M/UL (ref 4–5.4)
SODIUM SERPL-SCNC: 141 MMOL/L (ref 136–145)
SPECIMEN SOURCE AND ORGANISM ID: ABNORMAL
WBC # BLD AUTO: 10.65 K/UL (ref 3.9–12.7)

## 2020-10-04 PROCEDURE — 97110 THERAPEUTIC EXERCISES: CPT

## 2020-10-04 PROCEDURE — 36415 COLL VENOUS BLD VENIPUNCTURE: CPT

## 2020-10-04 PROCEDURE — 83735 ASSAY OF MAGNESIUM: CPT

## 2020-10-04 PROCEDURE — C9113 INJ PANTOPRAZOLE SODIUM, VIA: HCPCS | Performed by: STUDENT IN AN ORGANIZED HEALTH CARE EDUCATION/TRAINING PROGRAM

## 2020-10-04 PROCEDURE — 63600175 PHARM REV CODE 636 W HCPCS: Performed by: STUDENT IN AN ORGANIZED HEALTH CARE EDUCATION/TRAINING PROGRAM

## 2020-10-04 PROCEDURE — 25000003 PHARM REV CODE 250: Performed by: STUDENT IN AN ORGANIZED HEALTH CARE EDUCATION/TRAINING PROGRAM

## 2020-10-04 PROCEDURE — 25000242 PHARM REV CODE 250 ALT 637 W/ HCPCS: Performed by: NURSE PRACTITIONER

## 2020-10-04 PROCEDURE — 25000003 PHARM REV CODE 250: Performed by: INTERNAL MEDICINE

## 2020-10-04 PROCEDURE — 80053 COMPREHEN METABOLIC PANEL: CPT

## 2020-10-04 PROCEDURE — 84100 ASSAY OF PHOSPHORUS: CPT

## 2020-10-04 PROCEDURE — 25000003 PHARM REV CODE 250: Performed by: NURSE PRACTITIONER

## 2020-10-04 PROCEDURE — 85025 COMPLETE CBC W/AUTO DIFF WBC: CPT

## 2020-10-04 PROCEDURE — 86140 C-REACTIVE PROTEIN: CPT

## 2020-10-04 PROCEDURE — 11000001 HC ACUTE MED/SURG PRIVATE ROOM

## 2020-10-04 PROCEDURE — 94761 N-INVAS EAR/PLS OXIMETRY MLT: CPT

## 2020-10-04 PROCEDURE — 25000003 PHARM REV CODE 250: Performed by: COLON & RECTAL SURGERY

## 2020-10-04 PROCEDURE — 97535 SELF CARE MNGMENT TRAINING: CPT

## 2020-10-04 PROCEDURE — A4216 STERILE WATER/SALINE, 10 ML: HCPCS | Performed by: STUDENT IN AN ORGANIZED HEALTH CARE EDUCATION/TRAINING PROGRAM

## 2020-10-04 RX ORDER — HEPARIN SODIUM 5000 [USP'U]/ML
5000 INJECTION, SOLUTION INTRAVENOUS; SUBCUTANEOUS EVERY 8 HOURS
Status: COMPLETED | OUTPATIENT
Start: 2020-10-04 | End: 2020-10-04

## 2020-10-04 RX ADMIN — Medication 10 ML: at 06:10

## 2020-10-04 RX ADMIN — Medication 10 ML: at 12:10

## 2020-10-04 RX ADMIN — METOPROLOL TARTRATE 25 MG: 25 TABLET, FILM COATED ORAL at 09:10

## 2020-10-04 RX ADMIN — PANTOPRAZOLE SODIUM 40 MG: 40 INJECTION, POWDER, LYOPHILIZED, FOR SOLUTION INTRAVENOUS at 09:10

## 2020-10-04 RX ADMIN — LOPERAMIDE HYDROCHLORIDE 2 MG: 2 CAPSULE ORAL at 09:10

## 2020-10-04 RX ADMIN — HEPARIN SODIUM 5000 UNITS: 5000 INJECTION INTRAVENOUS; SUBCUTANEOUS at 01:10

## 2020-10-04 RX ADMIN — SODIUM CHLORIDE, SODIUM GLUCONATE, SODIUM ACETATE, POTASSIUM CHLORIDE AND MAGNESIUM CHLORIDE 1000 ML: 526; 502; 368; 37; 30 INJECTION, SOLUTION INTRAVENOUS at 05:10

## 2020-10-04 RX ADMIN — HEPARIN SODIUM 5000 UNITS: 5000 INJECTION INTRAVENOUS; SUBCUTANEOUS at 05:10

## 2020-10-04 RX ADMIN — CEFEPIME 1 G: 1 INJECTION, POWDER, FOR SOLUTION INTRAMUSCULAR; INTRAVENOUS at 01:10

## 2020-10-04 RX ADMIN — METRONIDAZOLE 500 MG: 500 TABLET ORAL at 09:10

## 2020-10-04 RX ADMIN — FLUCONAZOLE 400 MG: 200 TABLET ORAL at 09:10

## 2020-10-04 RX ADMIN — CEFEPIME 1 G: 1 INJECTION, POWDER, FOR SOLUTION INTRAMUSCULAR; INTRAVENOUS at 09:10

## 2020-10-04 RX ADMIN — METOPROLOL TARTRATE 25 MG: 25 TABLET, FILM COATED ORAL at 08:10

## 2020-10-04 RX ADMIN — METRONIDAZOLE 500 MG: 500 TABLET ORAL at 01:10

## 2020-10-04 RX ADMIN — METRONIDAZOLE 500 MG: 500 TABLET ORAL at 05:10

## 2020-10-04 RX ADMIN — LOPERAMIDE HYDROCHLORIDE 2 MG: 2 CAPSULE ORAL at 08:10

## 2020-10-04 RX ADMIN — PSYLLIUM HUSK 1 PACKET: 3.4 POWDER ORAL at 09:10

## 2020-10-04 RX ADMIN — CEFEPIME 1 G: 1 INJECTION, POWDER, FOR SOLUTION INTRAMUSCULAR; INTRAVENOUS at 04:10

## 2020-10-04 RX ADMIN — HEPARIN SODIUM 5000 UNITS: 5000 INJECTION INTRAVENOUS; SUBCUTANEOUS at 09:10

## 2020-10-04 NOTE — PROGRESS NOTES
[facility]  Urology  Progress Note    Patient Name: Edita Riley  MRN: [id number]  Admission Date: 9/11/2020  Hospital Length of Stay: 23 days  Code Status: Full Code   Attending Provider: Hammad Reynolds MD   Primary Care Physician: Audrey Mustafa MD    Subjective:     HPI:  Edita Riley is a 55 y.o. female with a history of cervical cancer s/p pelvic radiation. She has since developed bilateral ureteral strictures and bilateral hydronephrosis R>L. She had a MAG3 scan confirmed presence of bilateral obstruction. She is s/p open transverse colon conduit urinary diversion on 9/11/2020.     Interval History:   NAEON  Cr stable  Tolerating PO with no N/V  Pain controlled, ambulating      Objective:     Temp:  [96.9 °F (36.1 °C)-98.2 °F (36.8 °C)] 98.2 °F (36.8 °C)  Pulse:  [] 94  Resp:  [16-23] 16  SpO2:  [94 %-96 %] 95 %  BP: (114-136)/(60-68) 136/68     Body mass index is 28.94 kg/m².           Drains     Drain                 Urostomy 09/11/20 ileal conduit LLQ 21 days         Ileostomy 09/18/20 RLQ 14 days         Closed/Suction Drain 09/23/20 0720 Right Abdomen Bulb 10 Fr. 8 days         Nephrostomy 09/28/20 1210 Left 12 Fr. 3 days         Nephrostomy 09/28/20 1210 Right 12 Fr. 3 days                Physical Exam   Constitutional: No distress.   HENT:   Head: Normocephalic and atraumatic.   Eyes: Conjunctivae are normal. No scleral icterus.   Neck: Normal range of motion.   Cardiovascular: Normal rate.    Pulmonary/Chest: Effort normal. No respiratory distress.   Abdominal: Soft. She exhibits no distension. There is no abdominal tenderness. There is no rebound and no guarding.   Urostomy p/p/p draining clear yellow urine  Ileostomy output thickened   Midline incision with wound vac in place    Musculoskeletal: Normal range of motion.      Comments: SCDs in place   Neurological: She is alert.   Skin: Skin is warm and dry. She is not diaphoretic.         Significant  Labs:    BMP:  Recent Labs   Lab 10/02/20  0353 10/03/20  0308 10/04/20  0511    143 141   K 3.8 3.6 3.4*    103 103   CO2 32* 27 27   BUN 13 12 12   CREATININE 1.2 1.2 0.9   CALCIUM 8.6* 8.3* 8.4*       CBC:   Recent Labs   Lab 10/02/20  0353 10/03/20  0308 10/04/20  0511   WBC 15.02* 17.74* 10.65   HGB 6.6* 7.0* 7.2*   HCT 22.4* 24.6* 24.3*   * 637* 484*       All pertinent labs results from the past 24 hours have been reviewed.    Significant Imaging:  All pertinent imaging results/findings from the past 24 hours have been reviewed.                  Assessment/Plan:     * Bilateral ureteral obstruction  S/p urinary diversion with colon conduit 9/11   S/p emergent ex-lap 9/17, colo-colic anastomosis intact, bowel perforation found, underwent resection of right colon, remaining transverses colon, and proximal descending colon, ileostomy creation, Charlotte's pouch created     - CRS primary  - regular diet per Primary  - KUB with some dilated small bowel, relatively unrevealing, now tolerating diet  - nephrostomy tubes removed 10/1 - Cr stable, good UOP from ostomy  - continue PT/OT, OOBTC as tolerated, encourage ambulation  - Wound care ostomy consulted for assistance with ostomy teaching   - patient is a [employer]ism; management per primary and Heme-Onc (on EPO, B12, and iron)  - agree with ID recommendations: PICC line DC'd 9/28, cefepime for Enterobacter and flagyl for anaerobes  - 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 Repeat sooner if patient develops clinical change. Continue cefepime and flagyl until repeat CT, continue fluconazole 400mg daily for minimum of 4 weeks for retinal lesion consistent w/ candida. Patient will need serial ophtho exams to help determine final duration of therapy.  - follow up in ID clinic in 2 weeks    - Prophylaxis: IS, SCDs, GI  ppx        VTE Risk Mitigation (From admission, onward)         Ordered     heparin (porcine) injection 5,000 Units  Every 8 hours      09/29/20 0553     IP VTE HIGH RISK PATIENT  Once      09/11/20 2024     Place sequential compression device  Until discontinued      09/11/20 2024                Gokul Conway MD  Urology  [facility]

## 2020-10-04 NOTE — PLAN OF CARE
[facility] is following pt and per notes is expected to take pt on Monday. CM forwarded wknd notes. M-F CM and SW will f/u on Monday AM.    Wknd LAURA RICHARDSON [id number]     10/04/20 1053   Discharge Reassessment   Assessment Type Discharge Planning Reassessment   Discharge Plan A Rehab   Discharge Plan B Home with family;Home Health   Anticipated Discharge Disposition Rehab   Post-Acute Status   Post-Acute Authorization Placement   Post-Acute Placement Status Pending Bed Availability   Discharge Delays None known at this time

## 2020-10-04 NOTE — CONSULTS
Radiology Consult    Edita Riley is a 55 y.o. female with h/o cervical cancer s/p radiation resulting in ureteral strictures for which she underwent b/l nephrostomy tube placement and creation of a transverse colon urinary conduit to LLQ complicated by a post-operative bowel perforation, now with a RLQ end ileostomy. IR placed a RLQ drain on 9/23 into an air fluid collection concerning for abscess. The most recent images showed 2 R sided pelvic fluid collections. IR is consulted to replace the old drain as it is not functioning and place another drain in the fluid collection just above the previous one.    Past Medical History:   Diagnosis Date    Abnormal mammogram 8/25/2020    Anxiety     Cervical cancer 2014    Chronic back pain     Depression     Diarrhea due to malabsorption 11/14/2018    Fibromyalgia     Generalized abdominal pain 8/25/2020    GERD (gastroesophageal reflux disease)     Hiatal hernia 2014    History of cervical cancer 10/11/2018    History of cervical cancer 10/11/2018    Hx of psychiatric care     Cymbalta, trazodone    Hypertension     Hypomagnesemia 11/21/2018    Lactose intolerance     Metastatic squamous cell carcinoma to lymph node 10/11/2018    Nephrostomy tube displaced     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 drug 11/14/2018    Osteoarthritis of back     Psychiatric problem     Stroke 1972     Past Surgical History:   Procedure Laterality Date    ANTEGRADE NEPHROSTOGRAPHY Bilateral 9/28/2020    Procedure: Nephrostogram - antegrade;  Surgeon: Leslie Balbuena MD;  Location: [facility] OR [address] [place] [address];  Service: Urology;  Laterality: Bilateral;    BILATERAL OOPHORECTOMY  2015    CHOLECYSTECTOMY  11/09/2016    Done at [facility], path showed chronic cholecystitis and gallstones    cold knife conization  2014    COLECTOMY, RIGHT  9/17/2020    Procedure: COLECTOMY, RIGHT Extended;  Surgeon: Hammad Reynolds MD;  Location: [facility] OR 2ND FLR;  Service: Colon and Rectal;;     COLONOSCOPY  2014    [facility]    COLONOSCOPY N/A 5/18/2018    Procedure: COLONOSCOPY;  Surgeon: Arden Gutiérrez MD;  Location: [facility] ENDO (4TH FLR);  Service: Endoscopy;  Laterality: N/A;    COLONOSCOPY N/A 7/28/2020    Procedure: COLONOSCOPY;  Surgeon: Hammad Reynolds MD;  Location: [facility] ENDO (4TH FLR);  Service: Colon and Rectal;  Laterality: N/A;  covid test [place] 7/25    CYSTOSCOPY WITH URETEROSCOPY, RETROGRADE PYELOGRAPHY, AND INSERTION OF STENT Bilateral 3/21/2020    Procedure: CYSTOSCOPY, WITH RETROGRADE PYELOGRAM,;  Surgeon: Leslie Balbuena MD;  Location: [facility] OR 1ST FLR;  Service: Urology;  Laterality: Bilateral;    ESOPHAGOGASTRODUODENOSCOPY  2014    HYSTERECTOMY  2014    [facility] for cervical cancer    ILEOSTOMY  9/17/2020    Procedure: CREATION, ILEOSTOMY;  Surgeon: Hammad Reynolds MD;  Location: [facility] OR 2ND FLR;  Service: Colon and Rectal;;    MOBILIZATION OF SPLENIC FLEXURE N/A 9/11/2020    Procedure: MOBILIZATION, COLONIC;  Surgeon: Hammad Reynolds MD;  Location: [facility] OR 2ND FLR;  Service: Colon and Rectal;  Laterality: N/A;    OOPHORECTOMY      RETROPERITONEAL LYMPHADENECTOMY Right 9/17/2018    Procedure: LYMPHADENECTOMY, RETROPERITONEUM;  Surgeon: Sebas Reed MD;  Location: [facility] OR 2ND FLR;  Service: General;  Laterality: Right;  ILIAC       Discussed with primary team, Dr. David MD.    Imaging reviewed with Radiology staff, Dr. Quinton MD.     Procedure: Drain placement in 2 pelvic fluid collections.    Scheduled Meds:    ceFEPime (MAXIPIME) IVPB  1 g Intravenous Q8H    cyanocobalamin  100 mcg Intramuscular Weekly    epoetin yecenia-epbx  20,000 Units Subcutaneous Q48H    fluconazole  400 mg Oral Daily    heparin (porcine)  5,000 Units Subcutaneous Q8H    loperamide  2 mg Oral BID    metoprolol tartrate  25 mg Oral BID    metroNIDAZOLE  500 mg Oral Q8H    pantoprazole  40 mg Intravenous Daily    psyllium husk (aspartame)  1  packet Oral Daily    sodium chloride 0.9%  10 mL Intravenous Q6H     Continuous Infusions:    electrolyte-A 1,000 mL (10/04/20 0528)     PRN Meds:acetaminophen, dextrose 50%, glucagon (human recombinant), HYDROmorphone, insulin aspart U-100, iohexol, iohexol, labetalol, ondansetron, oxyCODONE-acetaminophen, oxyCODONE-acetaminophen, sodium chloride 0.9%, Flushing PICC Protocol **AND** sodium chloride 0.9% **AND** sodium chloride 0.9%, sodium chloride 0.9%    Allergies:   Review of patient's allergies indicates:   Allergen Reactions    Bee sting [allergen ext-venom-honey bee]      Rash      Grass pollen-bermuda, standard      rash       Labs:  No results for input(s): INR in the last 168 hours.    Invalid input(s):  PT,  PTT    Recent Labs   Lab 10/04/20  0511   WBC 10.65   HGB 7.2*   HCT 24.3*   MCV 94   *      Recent Labs   Lab 10/04/20  0511         K 3.4*      CO2 27   BUN 12   CREATININE 0.9   CALCIUM 8.4*   MG 1.7   ALT 8*   AST 10   ALBUMIN 1.8*   BILITOT 0.3         Vitals (Most Recent):  Temp: 98.2 °F (36.8 °C) (10/04/20 0427)  Pulse: 94 (10/04/20 0427)  Resp: 16 (10/04/20 0427)  BP: 136/68 (10/04/20 0427)  SpO2: 95 % (10/04/20 0427)    Plan:   1. NPO after midnight.  2. Hold anticoagulants.  3. Drain placement into pelvic fluid collections is  scheduled for tomorrow.    Eliot Naranjo MD  Radiology Department/ PGY-3  [facility]

## 2020-10-04 NOTE — PT/OT/SLP PROGRESS
Occupational Therapy   Treatment    Name: Edita Riley  MRN: [id number]  Admitting Diagnosis:  Bilateral ureteral obstruction  6 Days Post-Op    Recommendations:     Discharge Recommendations: rehabilitation facility  Discharge Equipment Recommendations:  bath bench, walker, rolling  Barriers to discharge:  None    Assessment:     Edita Riley is a 55 y.o. female with a medical diagnosis of Bilateral ureteral obstruction.  She presents with impairments listed below. Pt did well to tolerate and participate in the session. Pt is progressing towards goals. Pt displayed global deconditioning requiring increased assist for ADLs and mobility at this time. Pt would benefit from skilled OT services to improve independence and overall occupational functioning.     Performance deficits affecting function are weakness, impaired endurance, impaired self care skills, impaired functional mobilty, decreased lower extremity function.     Rehab Prognosis:  Good; patient would benefit from acute skilled OT services to address these deficits and reach maximum level of function.       Plan:     Patient to be seen 3 x/week to address the above listed problems via self-care/home management, therapeutic activities, therapeutic exercises, neuromuscular re-education  · Plan of Care Expires: 10/11/20  · Plan of Care Reviewed with: patient    Subjective     Pain/Comfort:  · Pain Rating 1: 0/10  · Pain Rating Post-Intervention 1: 0/10    Objective:     Communicated with: RN prior to session.  Patient found up in chair with wound vac, peripheral IV, ORESTES drain, colostomy upon OT entry to room.    General Precautions: Standard, fall   Orthopedic Precautions:N/A   Braces: N/A     Occupational Performance:        Functional Mobility/Transfers:  · Patient completed Sit <> Stand Transfer with stand by assistance  with  rolling walker   · Functional Mobility: Pt ambulated ~250 ft at sba w/ RW. Pt w/ noted decreased karma.      Activities of Daily Living:  · Grooming: stand by assistance oral and facial hygiene while standing at the sink      [facility] 6 Click ADL: 19    Treatment & Education:  Pt educated on POC.     Patient left up in chair with all lines intact and call button in reachEducation:      GOALS:   Multidisciplinary Problems     Occupational Therapy Goals        Problem: Occupational Therapy Goal    Goal Priority Disciplines Outcome Interventions   Occupational Therapy Goal     OT, PT/OT Ongoing, Progressing    Description: Goals to be met by: 10/8/2020    Patient will increase functional independence with ADLs by performing:    LE Dressing with Supervision.  Grooming while standing with Supervision.   Toileting from toilet with Supervision for hygiene and clothing management.   Supine to sit with Supervision.  Toilet transfer to toilet with Supervision.                     Time Tracking:     OT Date of Treatment: 10/04/20  OT Start Time: 1026  OT Stop Time: 1049  OT Total Time (min): 23 min    Billable Minutes:Self Care/Home Management 8 minutes  Therapeutic Exercise 15 minutes    Oziel Raya, OT  10/4/2020

## 2020-10-04 NOTE — ASSESSMENT & PLAN NOTE
Ms. Riley is a 55 y.o. female with b/l ureteral obstruction s/p transverse colon conduit on 9/11/2020. Extended R colectomy and ileostomy creation on 9/17.     - low K diet in place; tolerating well  - continue IV antibiotics per ID's recs  - increasing leukocytosis; 10.6<-- 17 <-- 15 ; remains afebrile  - trend CRPs; 112 <-- 129  - IR consult placed; to discuss drain study with IR vs new drain placement for persistent RLQ collection  - ODESSA resolving; BUN/Cr downtrending; 12/0.9 today  - mIVFs electrolyte-A @100mL/hr, per nephro recs; patient to follow-up with nephro in 4 wks  - ileostomy and urostomy in place with appropriate output  - continue to monitor I/Os  -  nephrosotrogram 9/28   - OOB, activity as tolerated  - PT/OT  - SW on board; patient approved for rehab  - patient stable for discharge to rehab; pending insurance authorization

## 2020-10-04 NOTE — SUBJECTIVE & OBJECTIVE
Subjective:     Interval History: Patient seen and examined this am. No acute events overnight. WBC normalizing; CRP down-trending. Patient endorses healthy appetite, ambulation. IR consulted regarding RLQ drain and persistent fluid collection. Awaiting insurance authorization to send to rehabilitation. No new complaints at this time.     Post-Op Info:  Procedure(s) (LRB):  Nephrostogram - antegrade (Bilateral)  REMOVAL, STENT, URETER (Bilateral)   6 Days Post-Op      Medications:  Continuous Infusions:   electrolyte-A 1,000 mL (10/04/20 0528)     Scheduled Meds:   ceFEPime (MAXIPIME) IVPB  1 g Intravenous Q8H    cyanocobalamin  100 mcg Intramuscular Weekly    epoetin yecenia-epbx  20,000 Units Subcutaneous Q48H    fluconazole  400 mg Oral Daily    heparin (porcine)  5,000 Units Subcutaneous Q8H    loperamide  2 mg Oral BID    metoprolol tartrate  25 mg Oral BID    metroNIDAZOLE  500 mg Oral Q8H    pantoprazole  40 mg Intravenous Daily    psyllium husk (aspartame)  1 packet Oral Daily    sodium chloride 0.9%  10 mL Intravenous Q6H     PRN Meds:   acetaminophen    dextrose 50%    glucagon (human recombinant)    HYDROmorphone    insulin aspart U-100    iohexol    iohexol    labetalol    ondansetron    oxyCODONE-acetaminophen    oxyCODONE-acetaminophen    sodium chloride 0.9%    sodium chloride 0.9%    sodium chloride 0.9%        Objective:     Vital Signs (Most Recent):  Temp: 98.2 °F (36.8 °C) (10/04/20 0427)  Pulse: 94 (10/04/20 0427)  Resp: 16 (10/04/20 0427)  BP: 136/68 (10/04/20 0427)  SpO2: 95 % (10/04/20 0427) Vital Signs (24h Range):  Temp:  [96.9 °F (36.1 °C)-98.2 °F (36.8 °C)] 98.2 °F (36.8 °C)  Pulse:  [] 94  Resp:  [16-23] 16  SpO2:  [94 %-96 %] 95 %  BP: (114-136)/(60-68) 136/68     Intake/Output - Last 3 Shifts       10/02 0700 - 10/03 0659 10/03 0700 - 10/04 0659 10/04 0700 - 10/05 0659    P.O. 1066      I.V. (mL/kg) 900 (10.1)      Total Intake(mL/kg) 1966 (22.1)       Urine (mL/kg/hr) 1250 (0.6) 600 (0.3)     Emesis/NG output 0      Drains 0 0     Other 0 100     Stool 0 800     Total Output 1250 1500     Net +716 -1500                  Physical Exam  Vitals signs and nursing note reviewed.   Constitutional:       Appearance: She is well-developed. She is not ill-appearing.   HENT:      Head: Normocephalic.   Eyes:      Pupils: Pupils are equal, round, and reactive to light.   Cardiovascular:      Rate and Rhythm: Normal rate and regular rhythm.      Heart sounds: Normal heart sounds.   Pulmonary:      Effort: Pulmonary effort is normal. No respiratory distress.      Breath sounds: Normal breath sounds. No wheezing or rales.   Abdominal:      Palpations: Abdomen is soft. There is no mass.      Tenderness: There is no guarding or rebound.      Comments: Ileostomy in place to RLQ with gas and stool within ostomy bag.  Urostomy bag in place to LLQ with clear, yellow urine  Midline wound vac remains in place with excellent seal  RLQ IR drain in place   Musculoskeletal: Normal range of motion.   Skin:     General: Skin is warm and dry.   Neurological:      Mental Status: She is alert and oriented to person, place, and time.   Psychiatric:         Behavior: Behavior normal.         Thought Content: Thought content normal.         Judgment: Judgment normal.         Significant Labs:  CBC:   Recent Labs   Lab 10/04/20  0511   WBC 10.65   RBC 2.60*   HGB 7.2*   HCT 24.3*   *   MCV 94   MCH 27.7   MCHC 29.6*     CMP:   Recent Labs   Lab 10/04/20  0511      CALCIUM 8.4*   ALBUMIN 1.8*   PROT 6.4      K 3.4*   CO2 27      BUN 12   CREATININE 0.9   ALKPHOS 137*   ALT 8*   AST 10   BILITOT 0.3     CRP (Last 3 Results):   Recent Labs   Lab 10/03/20  1045 10/04/20  0511   .4* 112.6*       Significant Diagnostics:  I have reviewed all pertinent imaging results/findings within the past 24 hours.

## 2020-10-04 NOTE — ASSESSMENT & PLAN NOTE
S/p urinary diversion with colon conduit 9/11   S/p emergent ex-lap 9/17, colo-colic anastomosis intact, bowel perforation found, underwent resection of right colon, remaining transverses colon, and proximal descending colon, ileostomy creation, Charlotte's pouch created     - CRS primary  - regular diet per Primary  - KUB with some dilated small bowel, relatively unrevealing, now tolerating diet  - nephrostomy tubes removed 10/1 - Cr stable, good UOP from ostomy  - continue PT/OT, OOBTC as tolerated, encourage ambulation  - Wound care ostomy consulted for assistance with ostomy teaching   - patient is a [employer]; management per primary and Heme-Onc (on EPO, B12, and iron)  - agree with ID recommendations: PICC line DC'd 9/28, cefepime for Enterobacter and flagyl for anaerobes  - 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 Repeat sooner if patient develops clinical change. Continue cefepime and flagyl until repeat CT, continue fluconazole 400mg daily for minimum of 4 weeks for retinal lesion consistent w/ candida. Patient will need serial ophtho exams to help determine final duration of therapy.  - follow up in ID clinic in 2 weeks     - Drains: maintain, IR drain (Cr 0.7), NTs  - Prophylaxis: IS, SCDs, GI ppx

## 2020-10-04 NOTE — PROGRESS NOTES
[facility]  Colorectal Surgery  Progress Note    Patient Name: Edita Riley  MRN: [id number]  Admission Date: 9/11/2020  Hospital Length of Stay: 23 days  Attending Physician: Hammad Reynolds MD    Subjective:     Interval History: Patient seen and examined this am. No acute events overnight. WBC normalizing; CRP down-trending. Patient endorses healthy appetite, ambulation. IR consulted regarding RLQ drain and persistent fluid collection. Awaiting insurance authorization to send to rehabilitation. No new complaints at this time.     Post-Op Info:  Procedure(s) (LRB):  Nephrostogram - antegrade (Bilateral)  REMOVAL, STENT, URETER (Bilateral)   6 Days Post-Op      Medications:  Continuous Infusions:   electrolyte-A 1,000 mL (10/04/20 0528)     Scheduled Meds:   ceFEPime (MAXIPIME) IVPB  1 g Intravenous Q8H    cyanocobalamin  100 mcg Intramuscular Weekly    epoetin yecenia-epbx  20,000 Units Subcutaneous Q48H    fluconazole  400 mg Oral Daily    heparin (porcine)  5,000 Units Subcutaneous Q8H    loperamide  2 mg Oral BID    metoprolol tartrate  25 mg Oral BID    metroNIDAZOLE  500 mg Oral Q8H    pantoprazole  40 mg Intravenous Daily    psyllium husk (aspartame)  1 packet Oral Daily    sodium chloride 0.9%  10 mL Intravenous Q6H     PRN Meds:   acetaminophen    dextrose 50%    glucagon (human recombinant)    HYDROmorphone    insulin aspart U-100    iohexol    iohexol    labetalol    ondansetron    oxyCODONE-acetaminophen    oxyCODONE-acetaminophen    sodium chloride 0.9%    sodium chloride 0.9%    sodium chloride 0.9%        Objective:     Vital Signs (Most Recent):  Temp: 98.2 °F (36.8 °C) (10/04/20 0427)  Pulse: 94 (10/04/20 0427)  Resp: 16 (10/04/20 0427)  BP: 136/68 (10/04/20 0427)  SpO2: 95 % (10/04/20 0427) Vital Signs (24h Range):  Temp:  [96.9 °F (36.1 °C)-98.2 °F (36.8 °C)] 98.2 °F (36.8 °C)  Pulse:  [] 94  Resp:  [16-23] 16  SpO2:  [94 %-96 %] 95 %  BP:  (114-136)/(60-68) 136/68     Intake/Output - Last 3 Shifts       10/02 0700 - 10/03 0659 10/03 0700 - 10/04 0659 10/04 0700 - 10/05 0659    P.O. 1066      I.V. (mL/kg) 900 (10.1)      Total Intake(mL/kg) 1966 (22.1)      Urine (mL/kg/hr) 1250 (0.6) 600 (0.3)     Emesis/NG output 0      Drains 0 0     Other 0 100     Stool 0 800     Total Output 1250 1500     Net +716 -1500                  Physical Exam  Vitals signs and nursing note reviewed.   Constitutional:       Appearance: She is well-developed. She is not ill-appearing.   HENT:      Head: Normocephalic.   Eyes:      Pupils: Pupils are equal, round, and reactive to light.   Cardiovascular:      Rate and Rhythm: Normal rate and regular rhythm.      Heart sounds: Normal heart sounds.   Pulmonary:      Effort: Pulmonary effort is normal. No respiratory distress.      Breath sounds: Normal breath sounds. No wheezing or rales.   Abdominal:      Palpations: Abdomen is soft. There is no mass.      Tenderness: There is no guarding or rebound.      Comments: Ileostomy in place to RLQ with gas and stool within ostomy bag.  Urostomy bag in place to LLQ with clear, yellow urine  Midline wound vac remains in place with excellent seal  RLQ IR drain in place   Musculoskeletal: Normal range of motion.   Skin:     General: Skin is warm and dry.   Neurological:      Mental Status: She is alert and oriented to person, place, and time.   Psychiatric:         Behavior: Behavior normal.         Thought Content: Thought content normal.         Judgment: Judgment normal.         Significant Labs:  CBC:   Recent Labs   Lab 10/04/20  0511   WBC 10.65   RBC 2.60*   HGB 7.2*   HCT 24.3*   *   MCV 94   MCH 27.7   MCHC 29.6*     CMP:   Recent Labs   Lab 10/04/20  0511      CALCIUM 8.4*   ALBUMIN 1.8*   PROT 6.4      K 3.4*   CO2 27      BUN 12   CREATININE 0.9   ALKPHOS 137*   ALT 8*   AST 10   BILITOT 0.3     CRP (Last 3 Results):   Recent Labs   Lab  10/03/20  1045 10/04/20  0511   .4* 112.6*       Significant Diagnostics:  I have reviewed all pertinent imaging results/findings within the past 24 hours.    Assessment/Plan:     * Bilateral ureteral obstruction  Ms. Riley is a 55 y.o. female with b/l ureteral obstruction s/p transverse colon conduit on 9/11/2020. Extended R colectomy and ileostomy creation on 9/17.     - low K diet in place; tolerating well  - continue IV antibiotics per ID's recs  - increasing leukocytosis; 10.6<-- 17 <-- 15 ; remains afebrile  - trend CRPs; 112 <-- 129  - IR consult placed; to discuss drain study with IR vs new drain placement for persistent RLQ collection  - ODESSA resolving; BUN/Cr downtrending; 12/0.9 today  - mIVFs electrolyte-A @100mL/hr, per nephro recs; patient to follow-up with nephro in 4 wks  - ileostomy and urostomy in place with appropriate output  - continue to monitor I/Os  -  nephrosotrogram 9/28   - OOB, activity as tolerated  - PT/OT  - SW on board; patient approved for rehab  - patient stable for discharge to rehab; pending insurance authorization            Mark Martinez MD  Colorectal Surgery  [facility]

## 2020-10-04 NOTE — SUBJECTIVE & OBJECTIVE
Interval History:   NAEON  Cr stable  Tolerating PO with no N/V  Pain controlled, ambulating      Objective:     Temp:  [96.9 °F (36.1 °C)-98.2 °F (36.8 °C)] 98.2 °F (36.8 °C)  Pulse:  [] 94  Resp:  [16-23] 16  SpO2:  [94 %-96 %] 95 %  BP: (114-136)/(60-68) 136/68     Body mass index is 28.94 kg/m².           Drains     Drain                 Urostomy 09/11/20 ileal conduit LLQ 21 days         Ileostomy 09/18/20 RLQ 14 days         Closed/Suction Drain 09/23/20 0720 Right Abdomen Bulb 10 Fr. 8 days         Nephrostomy 09/28/20 1210 Left 12 Fr. 3 days         Nephrostomy 09/28/20 1210 Right 12 Fr. 3 days                Physical Exam   Constitutional: No distress.   HENT:   Head: Normocephalic and atraumatic.   Eyes: Conjunctivae are normal. No scleral icterus.   Neck: Normal range of motion.   Cardiovascular: Normal rate.    Pulmonary/Chest: Effort normal. No respiratory distress.   Abdominal: Soft. She exhibits no distension. There is no abdominal tenderness. There is no rebound and no guarding.   Urostomy p/p/p draining clear yellow urine  Ileostomy output thickened   Midline incision with wound vac in place    Musculoskeletal: Normal range of motion.      Comments: SCDs in place   Neurological: She is alert.   Skin: Skin is warm and dry. She is not diaphoretic.         Significant Labs:    BMP:  Recent Labs   Lab 10/02/20  0353 10/03/20  0308 10/04/20  0511    143 141   K 3.8 3.6 3.4*    103 103   CO2 32* 27 27   BUN 13 12 12   CREATININE 1.2 1.2 0.9   CALCIUM 8.6* 8.3* 8.4*       CBC:   Recent Labs   Lab 10/02/20  0353 10/03/20  0308 10/04/20  0511   WBC 15.02* 17.74* 10.65   HGB 6.6* 7.0* 7.2*   HCT 22.4* 24.6* 24.3*   * 637* 484*       All pertinent labs results from the past 24 hours have been reviewed.    Significant Imaging:  All pertinent imaging results/findings from the past 24 hours have been reviewed.

## 2020-10-05 LAB
ALBUMIN SERPL BCP-MCNC: 1.9 G/DL (ref 3.5–5.2)
ALP SERPL-CCNC: 118 U/L (ref 55–135)
ALT SERPL W/O P-5'-P-CCNC: 7 U/L (ref 10–44)
ANION GAP SERPL CALC-SCNC: 12 MMOL/L (ref 8–16)
AST SERPL-CCNC: 12 U/L (ref 10–40)
BASOPHILS # BLD AUTO: 0.03 K/UL (ref 0–0.2)
BASOPHILS NFR BLD: 0.3 % (ref 0–1.9)
BILIRUB SERPL-MCNC: 0.3 MG/DL (ref 0.1–1)
BUN SERPL-MCNC: 11 MG/DL (ref 6–20)
CALCIUM SERPL-MCNC: 8.4 MG/DL (ref 8.7–10.5)
CHLORIDE SERPL-SCNC: 104 MMOL/L (ref 95–110)
CO2 SERPL-SCNC: 25 MMOL/L (ref 23–29)
CREAT SERPL-MCNC: 0.9 MG/DL (ref 0.5–1.4)
CRP SERPL-MCNC: 85.4 MG/L (ref 0–8.2)
DIFFERENTIAL METHOD: ABNORMAL
EOSINOPHIL # BLD AUTO: 0.3 K/UL (ref 0–0.5)
EOSINOPHIL NFR BLD: 2.7 % (ref 0–8)
ERYTHROCYTE [DISTWIDTH] IN BLOOD BY AUTOMATED COUNT: 19.9 % (ref 11.5–14.5)
EST. GFR  (AFRICAN AMERICAN): >60 ML/MIN/1.73 M^2
EST. GFR  (NON AFRICAN AMERICAN): >60 ML/MIN/1.73 M^2
GLUCOSE SERPL-MCNC: 93 MG/DL (ref 70–110)
GRAM STN SPEC: NORMAL
GRAM STN SPEC: NORMAL
HCT VFR BLD AUTO: 25.4 % (ref 37–48.5)
HGB BLD-MCNC: 6.9 G/DL (ref 12–16)
IMM GRANULOCYTES # BLD AUTO: 0.06 K/UL (ref 0–0.04)
IMM GRANULOCYTES NFR BLD AUTO: 0.6 % (ref 0–0.5)
LYMPHOCYTES # BLD AUTO: 1.1 K/UL (ref 1–4.8)
LYMPHOCYTES NFR BLD: 10.5 % (ref 18–48)
MAGNESIUM SERPL-MCNC: 1.5 MG/DL (ref 1.6–2.6)
MCH RBC QN AUTO: 25.8 PG (ref 27–31)
MCHC RBC AUTO-ENTMCNC: 27.2 G/DL (ref 32–36)
MCV RBC AUTO: 95 FL (ref 82–98)
MONOCYTES # BLD AUTO: 0.9 K/UL (ref 0.3–1)
MONOCYTES NFR BLD: 8.3 % (ref 4–15)
NEUTROPHILS # BLD AUTO: 8.3 K/UL (ref 1.8–7.7)
NEUTROPHILS NFR BLD: 77.6 % (ref 38–73)
NRBC BLD-RTO: 1 /100 WBC
PHOSPHATE SERPL-MCNC: 2.5 MG/DL (ref 2.7–4.5)
PLATELET # BLD AUTO: 442 K/UL (ref 150–350)
PMV BLD AUTO: 9.4 FL (ref 9.2–12.9)
POCT GLUCOSE: 119 MG/DL (ref 70–110)
POTASSIUM SERPL-SCNC: 3.3 MMOL/L (ref 3.5–5.1)
PROT SERPL-MCNC: 6.7 G/DL (ref 6–8.4)
RBC # BLD AUTO: 2.67 M/UL (ref 4–5.4)
SODIUM SERPL-SCNC: 141 MMOL/L (ref 136–145)
WBC # BLD AUTO: 10.67 K/UL (ref 3.9–12.7)

## 2020-10-05 PROCEDURE — C9113 INJ PANTOPRAZOLE SODIUM, VIA: HCPCS | Performed by: STUDENT IN AN ORGANIZED HEALTH CARE EDUCATION/TRAINING PROGRAM

## 2020-10-05 PROCEDURE — 11000001 HC ACUTE MED/SURG PRIVATE ROOM

## 2020-10-05 PROCEDURE — 25000003 PHARM REV CODE 250: Performed by: STUDENT IN AN ORGANIZED HEALTH CARE EDUCATION/TRAINING PROGRAM

## 2020-10-05 PROCEDURE — 25000003 PHARM REV CODE 250: Performed by: COLON & RECTAL SURGERY

## 2020-10-05 PROCEDURE — 83735 ASSAY OF MAGNESIUM: CPT

## 2020-10-05 PROCEDURE — 63600175 PHARM REV CODE 636 W HCPCS: Performed by: STUDENT IN AN ORGANIZED HEALTH CARE EDUCATION/TRAINING PROGRAM

## 2020-10-05 PROCEDURE — 87116 MYCOBACTERIA CULTURE: CPT

## 2020-10-05 PROCEDURE — 87205 SMEAR GRAM STAIN: CPT

## 2020-10-05 PROCEDURE — 25000003 PHARM REV CODE 250: Performed by: INTERNAL MEDICINE

## 2020-10-05 PROCEDURE — 99900035 HC TECH TIME PER 15 MIN (STAT)

## 2020-10-05 PROCEDURE — 87206 SMEAR FLUORESCENT/ACID STAI: CPT

## 2020-10-05 PROCEDURE — 87070 CULTURE OTHR SPECIMN AEROBIC: CPT

## 2020-10-05 PROCEDURE — A4216 STERILE WATER/SALINE, 10 ML: HCPCS | Performed by: STUDENT IN AN ORGANIZED HEALTH CARE EDUCATION/TRAINING PROGRAM

## 2020-10-05 PROCEDURE — 25000003 PHARM REV CODE 250: Performed by: NURSE PRACTITIONER

## 2020-10-05 PROCEDURE — 25000242 PHARM REV CODE 250 ALT 637 W/ HCPCS: Performed by: NURSE PRACTITIONER

## 2020-10-05 PROCEDURE — 85025 COMPLETE CBC W/AUTO DIFF WBC: CPT

## 2020-10-05 PROCEDURE — 86140 C-REACTIVE PROTEIN: CPT

## 2020-10-05 PROCEDURE — 97116 GAIT TRAINING THERAPY: CPT | Mod: CQ

## 2020-10-05 PROCEDURE — 36415 COLL VENOUS BLD VENIPUNCTURE: CPT

## 2020-10-05 PROCEDURE — 97530 THERAPEUTIC ACTIVITIES: CPT | Mod: CQ

## 2020-10-05 PROCEDURE — 87075 CULTR BACTERIA EXCEPT BLOOD: CPT

## 2020-10-05 PROCEDURE — 87102 FUNGUS ISOLATION CULTURE: CPT

## 2020-10-05 PROCEDURE — 84100 ASSAY OF PHOSPHORUS: CPT

## 2020-10-05 PROCEDURE — 80053 COMPREHEN METABOLIC PANEL: CPT

## 2020-10-05 PROCEDURE — 63600175 PHARM REV CODE 636 W HCPCS: Performed by: RADIOLOGY

## 2020-10-05 RX ORDER — FENTANYL CITRATE 50 UG/ML
INJECTION, SOLUTION INTRAMUSCULAR; INTRAVENOUS CODE/TRAUMA/SEDATION MEDICATION
Status: COMPLETED | OUTPATIENT
Start: 2020-10-05 | End: 2020-10-05

## 2020-10-05 RX ORDER — MIDAZOLAM HYDROCHLORIDE 1 MG/ML
INJECTION INTRAMUSCULAR; INTRAVENOUS CODE/TRAUMA/SEDATION MEDICATION
Status: COMPLETED | OUTPATIENT
Start: 2020-10-05 | End: 2020-10-05

## 2020-10-05 RX ORDER — POTASSIUM CHLORIDE 7.45 MG/ML
10 INJECTION INTRAVENOUS ONCE
Status: COMPLETED | OUTPATIENT
Start: 2020-10-05 | End: 2020-10-05

## 2020-10-05 RX ADMIN — Medication 10 ML: at 12:10

## 2020-10-05 RX ADMIN — FLUCONAZOLE 400 MG: 200 TABLET ORAL at 08:10

## 2020-10-05 RX ADMIN — PSYLLIUM HUSK 1 PACKET: 3.4 POWDER ORAL at 08:10

## 2020-10-05 RX ADMIN — MIDAZOLAM HYDROCHLORIDE 1 MG: 1 INJECTION, SOLUTION INTRAMUSCULAR; INTRAVENOUS at 10:10

## 2020-10-05 RX ADMIN — SODIUM CHLORIDE, SODIUM GLUCONATE, SODIUM ACETATE, POTASSIUM CHLORIDE AND MAGNESIUM CHLORIDE 1000 ML: 526; 502; 368; 37; 30 INJECTION, SOLUTION INTRAVENOUS at 05:10

## 2020-10-05 RX ADMIN — LOPERAMIDE HYDROCHLORIDE 2 MG: 2 CAPSULE ORAL at 08:10

## 2020-10-05 RX ADMIN — PANTOPRAZOLE SODIUM 40 MG: 40 INJECTION, POWDER, LYOPHILIZED, FOR SOLUTION INTRAVENOUS at 08:10

## 2020-10-05 RX ADMIN — METOPROLOL TARTRATE 25 MG: 25 TABLET, FILM COATED ORAL at 08:10

## 2020-10-05 RX ADMIN — EPOETIN ALFA-EPBX 20000 UNITS: 10000 INJECTION, SOLUTION INTRAVENOUS; SUBCUTANEOUS at 10:10

## 2020-10-05 RX ADMIN — FENTANYL CITRATE 50 MCG: 50 INJECTION INTRAMUSCULAR; INTRAVENOUS at 10:10

## 2020-10-05 RX ADMIN — METRONIDAZOLE 500 MG: 500 TABLET ORAL at 05:10

## 2020-10-05 RX ADMIN — Medication 10 ML: at 05:10

## 2020-10-05 RX ADMIN — POTASSIUM CHLORIDE 10 MEQ: 7.46 INJECTION, SOLUTION INTRAVENOUS at 08:10

## 2020-10-05 RX ADMIN — CEFEPIME 1 G: 1 INJECTION, POWDER, FOR SOLUTION INTRAMUSCULAR; INTRAVENOUS at 08:10

## 2020-10-05 RX ADMIN — CEFEPIME 1 G: 1 INJECTION, POWDER, FOR SOLUTION INTRAMUSCULAR; INTRAVENOUS at 01:10

## 2020-10-05 RX ADMIN — CEFEPIME 1 G: 1 INJECTION, POWDER, FOR SOLUTION INTRAMUSCULAR; INTRAVENOUS at 05:10

## 2020-10-05 RX ADMIN — Medication 10 ML: at 06:10

## 2020-10-05 NOTE — PROCEDURES
Radiology Post-Procedure Note    Pre Op Diagnosis: Abdominal abscess    Post Op Diagnosis: Same     Procedure:right and pelvic abscess drainage    Procedure performed by: Shaw Alcantara MD    Written Informed Consent Obtained: Yes    Specimen Removed: YES total 60 cc fluid    Estimated Blood Loss: Minimal    Findings: CT was used for localization of abnormal fluid collection. A needle was inserted into the fluid collection and purulent fluid was aspirated.  A wire was inserted into the collection and the tract was dilated.  Two 10 Polish all-purpose drainage catheter were inserted (one in the right abdomen and one in the pelvis) and a pigtail loop of the distal end was formed.  The catheter was sutured into place and approximately  60 mL fluid was removed.     A specimen was sent to the lab for further analysis and culture.    The patient tolerated procedure well and there were no complications. Please see procedure report under Imaging for further details.    Shaw Alcantara M.D.  Diagnostic and Interventional Radiologist  Department of Radiology  Pager: [phone]    Shaw Alcantara MD  Diagnostic and Interventional Radiology  Department of Radiology

## 2020-10-05 NOTE — CARE UPDATE
Rapid Response Nurse Chart Check     Chart check completed, abnormal VS noted.  Plan for IR drain placement today, 10/5A.   Please call [phone] for further concerns or assistance.

## 2020-10-05 NOTE — H&P
Inpatient Radiology Pre-procedure Note    History of Present Illness:  Edita Riley is a 55 y.o. female with h/o cervical cancer s/p radiation resulting in ureteral strictures for which she underwent b/l nephrostomy tube placement and creation of a transverse colon urinary conduit to LLQ complicated by a post-operative bowel perforation, now with a RLQ end ileostomy. IR placed a RLQ drain on 9/23 into an air fluid collection concerning for abscess. The most recent images showed two R sided pelvic fluid collections. IR is consulted to replace the old drain as it is not functioning and place another drain in the fluid collection just above the previous one.    Admission H&P reviewed.  Past Medical History:   Diagnosis Date    Abnormal mammogram 8/25/2020    Anxiety     Cervical cancer 2014    Chronic back pain     Depression     Diarrhea due to malabsorption 11/14/2018    Fibromyalgia     Generalized abdominal pain 8/25/2020    GERD (gastroesophageal reflux disease)     Hiatal hernia 2014    History of cervical cancer 10/11/2018    History of cervical cancer 10/11/2018    Hx of psychiatric care     Cymbalta, trazodone    Hypertension     Hypomagnesemia 11/21/2018    Lactose intolerance     Metastatic squamous cell carcinoma to lymph node 10/11/2018    Nephrostomy tube displaced     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 drug 11/14/2018    Osteoarthritis of back     Psychiatric problem     Stroke 1972     Past Surgical History:   Procedure Laterality Date    ANTEGRADE NEPHROSTOGRAPHY Bilateral 9/28/2020    Procedure: Nephrostogram - antegrade;  Surgeon: Leslie Balbuena MD;  [facility] OR [address] [place] [address];  Service: Urology;  Laterality: Bilateral;    BILATERAL OOPHORECTOMY  2015    CHOLECYSTECTOMY  11/09/2016    Done at [facility], path showed chronic cholecystitis and gallstones    cold knife conization  2014    COLECTOMY, RIGHT  9/17/2020    Procedure: COLECTOMY, RIGHT Extended;   Surgeon: Hammad Reynolds MD;  Location: [facility] OR 2ND FLR;  Service: Colon and Rectal;;    COLONOSCOPY  2014    [facility]    COLONOSCOPY N/A 5/18/2018    Procedure: COLONOSCOPY;  Surgeon: Arden Gutiérrez MD;  Location: [facility] ENDO (4TH FLR);  Service: Endoscopy;  Laterality: N/A;    COLONOSCOPY N/A 7/28/2020    Procedure: COLONOSCOPY;  Surgeon: Hammad Reynolds MD;  Location: [facility] ENDO (4TH FLR);  Service: Colon and Rectal;  Laterality: N/A;  covid test [place] 7/25    CYSTOSCOPY WITH URETEROSCOPY, RETROGRADE PYELOGRAPHY, AND INSERTION OF STENT Bilateral 3/21/2020    Procedure: CYSTOSCOPY, WITH RETROGRADE PYELOGRAM,;  Surgeon: Leslie Balbuena MD;  Location: [facility] OR 1ST FLR;  Service: Urology;  Laterality: Bilateral;    ESOPHAGOGASTRODUODENOSCOPY  2014    HYSTERECTOMY  2014    [facility] for cervical cancer    ILEOSTOMY  9/17/2020    Procedure: CREATION, ILEOSTOMY;  Surgeon: Hammad Reynolds MD;  Location: [facility] OR 2ND FLR;  Service: Colon and Rectal;;    MOBILIZATION OF SPLENIC FLEXURE N/A 9/11/2020    Procedure: MOBILIZATION, COLONIC;  Surgeon: Hammad Reynolds MD;  Location: [facility] OR 2ND FLR;  Service: Colon and Rectal;  Laterality: N/A;    OOPHORECTOMY      RETROPERITONEAL LYMPHADENECTOMY Right 9/17/2018    Procedure: LYMPHADENECTOMY, RETROPERITONEUM;  Surgeon: Sebas Reed MD;  [facility];  Service: General;  Laterality: Right;  ILIAC       Review of Systems:   As documented in primary team H&P    Home Meds:   Prior to Admission medications    Medication Sig Start Date End Date Taking? Authorizing Provider   dicyclomine (BENTYL) 20 mg tablet Take 1 tablet (20 mg total) by mouth 3 (three) times daily as needed.  Patient taking differently: Take 20 mg by mouth 3 (three) times daily as needed. Take if needed 9/1/20  Yes TONI Chowdary   gabapentin (NEURONTIN) 300 MG capsule Take 1 capsule (300 mg total) by mouth 3 (three) times daily.  Patient taking  differently: Take 300 mg by mouth 3 (three) times daily. Take in am of surgery 9/3/19  Yes Refugio Benjamin MD   ketoconazole (NIZORAL) 2 % cream Apply topically once daily to affected area  Patient taking differently: Apply topically once daily. Hold am of surgery 7/20/20  Yes Audrey Mustafa MD   metroNIDAZOLE (FLAGYL) 500 MG tablet Take 1 tablet (500 mg total) by mouth As instructed. Take 1 tablet at 1pm, 2pm, 11pm the day before surgery  Patient taking differently: Take 500 mg by mouth As instructed. Take 1 tablet at 1pm, 2pm, 11pm the day before surgery  Take per surgeons instructions 9/9/20  Yes Hammad Reynolds MD   neomycin (MYCIFRADIN) 500 mg Tab Take 2 tablets (1,000 mg total) by mouth As instructed. Take 2 tablets at 1pm, 2pm, 11pm the day before surgery  Patient taking differently: Take 1,000 mg by mouth As instructed. Take 2 tablets at 1pm, 2pm, 11pm the day before surgery.  Take per surgeons instructions 9/9/20  Yes Hammad Reynolds MD   omeprazole (PRILOSEC) 40 MG capsule Take 1 capsule (40 mg total) by mouth once daily.  Patient taking differently: Take 40 mg by mouth once daily. Take in am of surgery 7/10/20 7/10/21 Yes Audrey Mustafa MD   oxyCODONE (ROXICODONE) 5 MG immediate release tablet Take 1 tablet (5 mg total) by mouth every 8 (eight) hours as needed for Pain.  Patient taking differently: Take 5 mg by mouth every 8 (eight) hours as needed for Pain. Take if needed 6/12/20  Yes Eddie Bashir DO   polyethylene glycol (GLYCOLAX) 17 gram/dose powder Take 17 g by mouth once daily.  Patient taking differently: Take 17 g by mouth once daily. Hold am of surgery 9/1/20  Yes TONI Chowdary   traZODone (DESYREL) 50 MG tablet TAKE 1 TABLET (50 MG TOTAL) BY MOUTH EVERY EVENING.  Patient taking differently: Take 50 mg by mouth every evening. Takes at night 11/13/19 11/12/20 Yes Audrey Mustafa MD   ondansetron (ZOFRAN-ODT) 4 MG TbDL Take 1 tablet (4 mg total) by mouth  every 8 (eight) hours as needed (nausea or vomiting).  Patient taking differently: Take 4 mg by mouth every 8 (eight) hours as needed (nausea or vomiting). Take if needed 8/6/20   Andrew Quiñones MD     Scheduled Meds:    ceFEPime (MAXIPIME) IVPB  1 g Intravenous Q8H    cyanocobalamin  100 mcg Intramuscular Weekly    epoetin yecenia-epbx  20,000 Units Subcutaneous Q48H    fluconazole  400 mg Oral Daily    heparin (porcine)  5,000 Units Subcutaneous Q8H    loperamide  2 mg Oral BID    metoprolol tartrate  25 mg Oral BID    metroNIDAZOLE  500 mg Oral Q8H    pantoprazole  40 mg Intravenous Daily    psyllium husk (aspartame)  1 packet Oral Daily    sodium chloride 0.9%  10 mL Intravenous Q6H     Continuous Infusions:    electrolyte-A 1,000 mL (10/04/20 1752)     PRN Meds:acetaminophen, dextrose 50%, glucagon (human recombinant), HYDROmorphone, insulin aspart U-100, iohexol, iohexol, labetalol, ondansetron, oxyCODONE-acetaminophen, oxyCODONE-acetaminophen, sodium chloride 0.9%, Flushing PICC Protocol **AND** sodium chloride 0.9% **AND** sodium chloride 0.9%, sodium chloride 0.9%  Anticoagulants/Antiplatelets: no anticoagulation    Allergies:   Review of patient's allergies indicates:   Allergen Reactions    Bee sting [allergen ext-venom-honey bee]      Rash      Grass pollen-bermuda, standard      rash     Sedation Hx: have not been any systemic reactions    Labs:  No results for input(s): INR in the last 168 hours.    Invalid input(s):  PT,  PTT    Recent Labs   Lab 10/04/20  0511   WBC 10.65   HGB 7.2*   HCT 24.3*   MCV 94   *      Recent Labs   Lab 10/04/20  0511         K 3.4*      CO2 27   BUN 12   CREATININE 0.9   CALCIUM 8.4*   MG 1.7   ALT 8*   AST 10   ALBUMIN 1.8*   BILITOT 0.3         Vitals:  Temp: 97.6 °F (36.4 °C) (10/04/20 1418)  Pulse: 93 (10/04/20 1418)  Resp: 18 (10/04/20 1418)  BP: 120/65 (10/04/20 1418)  SpO2: 98 % (10/04/20 1418)     Physical Exam:  ASA:  3  Mallampati: 2    General: no acute distress  Mental Status: alert and oriented to person, place and time  HEENT: normocephalic, atraumatic  Chest: unlabored breathing  Heart: regular heart rate  Abdomen: nondistended  Extremity: moves all extremities    Plan: imaging guided drain replacement and place a new drain in a collection located above the previous one.  Sedation Plan: up to moderate    Eliot Naranjo MD  Radiology Department/ PGY-3  [facility]

## 2020-10-05 NOTE — PROGRESS NOTES
[facility]  Colorectal Surgery  Progress Note    Patient Name: Edita Riley  MRN: [id number]  Admission Date: 9/11/2020  Hospital Length of Stay: 24 days  Attending Physician: Hammad Reynolds MD    Subjective:     Interval History: Patient seen and examined this am. No acute events overnight. Patient currently NPO, anticoagulation held for IR procedure today. Plan to discuss patient with social work today regarding insurance authorization. No new complaints at this time.     Post-Op Info:  Procedure(s) (LRB):  Nephrostogram - antegrade (Bilateral)  REMOVAL, STENT, URETER (Bilateral)   7 Days Post-Op      Medications:  Continuous Infusions:   electrolyte-A 1,000 mL (10/05/20 0528)     Scheduled Meds:   ceFEPime (MAXIPIME) IVPB  1 g Intravenous Q8H    cyanocobalamin  100 mcg Intramuscular Weekly    epoetin yecenia-epbx  20,000 Units Subcutaneous Q48H    fluconazole  400 mg Oral Daily    loperamide  2 mg Oral BID    metoprolol tartrate  25 mg Oral BID    metroNIDAZOLE  500 mg Oral Q8H    pantoprazole  40 mg Intravenous Daily    psyllium husk (aspartame)  1 packet Oral Daily    sodium chloride 0.9%  10 mL Intravenous Q6H     PRN Meds:   acetaminophen    dextrose 50%    glucagon (human recombinant)    HYDROmorphone    insulin aspart U-100    iohexol    iohexol    labetalol    ondansetron    oxyCODONE-acetaminophen    oxyCODONE-acetaminophen    sodium chloride 0.9%    sodium chloride 0.9%    sodium chloride 0.9%        Objective:     Vital Signs (Most Recent):  Temp: 96.7 °F (35.9 °C) (10/05/20 0514)  Pulse: 97 (10/05/20 0514)  Resp: (!) 22 (10/05/20 0514)  BP: (!) 145/75 (10/05/20 0514)  SpO2: 97 % (10/05/20 0514) Vital Signs (24h Range):  Temp:  [96.7 °F (35.9 °C)-97.6 °F (36.4 °C)] 96.7 °F (35.9 °C)  Pulse:  [] 97  Resp:  [17-33] 22  SpO2:  [95 %-98 %] 97 %  BP: (120-150)/(65-79) 145/75     Intake/Output - Last 3 Shifts       10/03 0700 - 10/04 0659 10/04 0700 -  10/05 0659    Urine (mL/kg/hr) 600 (0.3) 600 (0.3)    Drains 0     Other 100 50    Stool 800 280    Total Output 1500 930    Net -1500 -930                Physical Exam  Vitals signs and nursing note reviewed.   Constitutional:       Appearance: She is well-developed. She is not ill-appearing.   HENT:      Head: Normocephalic.   Eyes:      Pupils: Pupils are equal, round, and reactive to light.   Cardiovascular:      Rate and Rhythm: Normal rate and regular rhythm.      Heart sounds: Normal heart sounds.   Pulmonary:      Effort: Pulmonary effort is normal. No respiratory distress.      Breath sounds: Normal breath sounds. No wheezing or rales.   Abdominal:      Palpations: Abdomen is soft. There is no mass.      Tenderness: There is no guarding or rebound.      Comments: Ileostomy in place to RLQ with gas and stool within ostomy bag.  Urostomy bag in place to LLQ with clear, yellow urine  Midline wound vac remains in place with excellent seal  RLQ IR drain in place   Musculoskeletal: Normal range of motion.   Skin:     General: Skin is warm and dry.   Neurological:      Mental Status: She is alert and oriented to person, place, and time.   Psychiatric:         Behavior: Behavior normal.         Thought Content: Thought content normal.         Judgment: Judgment normal.           Significant Labs:  CBC (Last 3 Results):   Recent Labs   Lab 10/03/20  0308 10/04/20  0511 10/05/20  0306   WBC 17.74* 10.65 10.67   RBC 2.63* 2.60* 2.67*   HGB 7.0* 7.2* 6.9*   HCT 24.6* 24.3* 25.4*   * 484* 442*   MCV 94 94 95   MCH 26.6* 27.7 25.8*   MCHC 28.5* 29.6* 27.2*     CMP (Last 3 Results):   Recent Labs   Lab 10/03/20  0308 10/04/20  0511 10/05/20  0306    103 93   CALCIUM 8.3* 8.4* 8.4*   ALBUMIN 1.9* 1.8* 1.9*   PROT 6.7 6.4 6.7    141 141   K 3.6 3.4* 3.3*   CO2 27 27 25    103 104   BUN 12 12 11   CREATININE 1.2 0.9 0.9   ALKPHOS 178* 137* 118   ALT 8* 8* 7*   AST 18 10 12   BILITOT 0.4 0.3 0.3        Significant Diagnostics:  I have reviewed all pertinent imaging results/findings within the past 24 hours.    Assessment/Plan:     * Bilateral ureteral obstruction  Ms. Riley is a 55 y.o. female with b/l ureteral obstruction s/p transverse colon conduit on 9/11/2020. Extended R colectomy and ileostomy creation on 9/17.     - plan for IR drain placement today  - NPO and lovenox held for procedure  - patient ok for diet once procedure complete  - continue IV antibiotics per ID's recs  - leukocytosis resolved. WBC stable at 10; remains afebrile  - CRP improving; 85.4 <-- 112; continue to trend  - ODESSA resolved; BUN/Cr downtrending; 11/0.9 today  - mIVFs electrolyte-A @100mL/hr, per nephro recs; patient to follow-up with nephro in 4 wks  - ileostomy and urostomy in place with appropriate output  - continue to monitor I/Os  -  nephrosotrogram 9/28   - OOB, activity as tolerated  - PT/OT  - SW on board; patient approved for rehab  - to discuss patient's insurance authorization for rehab with social work today            Mark Martinez MD  Colorectal Surgery  [facility]

## 2020-10-05 NOTE — ASSESSMENT & PLAN NOTE
Ms. Riley is a 55 y.o. female with b/l ureteral obstruction s/p transverse colon conduit on 9/11/2020. Extended R colectomy and ileostomy creation on 9/17.     - plan for IR drain placement today  - NPO and lovenox held for procedure  - patient ok for diet once procedure complete  - continue IV antibiotics per ID's recs  - leukocytosis resolved. WBC stable at 10; remains afebrile  - CRP improving; 85.4 <-- 112; continue to trend  - ODESSA resolved; BUN/Cr downtrending; 11/0.9 today  - mIVFs electrolyte-A @100mL/hr, per nephro recs; patient to follow-up with nephro in 4 wks  - ileostomy and urostomy in place with appropriate output  - continue to monitor I/Os  -  nephrosotrogram 9/28   - OOB, activity as tolerated  - PT/OT  - SW on board; patient approved for rehab  - to discuss patient's insurance authorization for rehab with social work today

## 2020-10-05 NOTE — PROGRESS NOTES
[facility]  Urology  Progress Note    Patient Name: Edita Riley  MRN: [id number]  Admission Date: 9/11/2020  Hospital Length of Stay: 24 days  Code Status: Full Code   Attending Provider: Hammad Reynolds MD   Primary Care Physician: Audrey Mustafa MD    Subjective:     HPI:  Edita Riley is a 55 y.o. female with a history of cervical cancer s/p pelvic radiation. She has since developed bilateral ureteral strictures and bilateral hydronephrosis R>L. She had a MAG3 scan confirmed presence of bilateral obstruction. She is s/p open transverse colon conduit urinary diversion on 9/11/2020.     Interval History:   NAEON, Cr stable   Tolerating PO with no N/V  Pain controlled, ambulating  NPO for IR procedure today       Objective:     Temp:  [96.7 °F (35.9 °C)-97.6 °F (36.4 °C)] 96.7 °F (35.9 °C)  Pulse:  [] 97  Resp:  [17-33] 22  SpO2:  [95 %-98 %] 97 %  BP: (120-150)/(65-79) 145/75     Body mass index is 28.94 kg/m².           Drains     Drain                 Urostomy 09/11/20 ileal conduit LLQ 21 days         Ileostomy 09/18/20 RLQ 14 days         Closed/Suction Drain 09/23/20 0720 Right Abdomen Bulb 10 Fr. 8 days         Nephrostomy 09/28/20 1210 Left 12 Fr. 3 days         Nephrostomy 09/28/20 1210 Right 12 Fr. 3 days                Physical Exam   Constitutional: No distress.   HENT:   Head: Normocephalic and atraumatic.   Eyes: Conjunctivae are normal. No scleral icterus.   Neck: Normal range of motion.   Cardiovascular: Normal rate.    Pulmonary/Chest: Effort normal. No respiratory distress.   Abdominal: Soft. She exhibits no distension. There is no abdominal tenderness. There is no rebound and no guarding.   Urostomy p/p/p draining clear yellow urine  Ileostomy output thickened   Midline incision with wound vac in place    Musculoskeletal: Normal range of motion.      Comments: SCDs in place   Neurological: She is alert.   Skin: Skin is warm and dry. She is not  diaphoretic.         Significant Labs:    BMP:  Recent Labs   Lab 10/03/20  0308 10/04/20  0511 10/05/20  0306    141 141   K 3.6 3.4* 3.3*    103 104   CO2 27 27 25   BUN 12 12 11   CREATININE 1.2 0.9 0.9   CALCIUM 8.3* 8.4* 8.4*       CBC:   Recent Labs   Lab 10/03/20  0308 10/04/20  0511 10/05/20  0306   WBC 17.74* 10.65 10.67   HGB 7.0* 7.2* 6.9*   HCT 24.6* 24.3* 25.4*   * 484* 442*       All pertinent labs results from the past 24 hours have been reviewed.    Significant Imaging:  All pertinent imaging results/findings from the past 24 hours have been reviewed.              Review of Systems          Assessment/Plan:     * Bilateral ureteral obstruction  S/p urinary diversion with colon conduit 9/11   S/p emergent ex-lap 9/17, colo-colic anastomosis intact, bowel perforation found, underwent resection of right colon, remaining transverses colon, and proximal descending colon, ileostomy creation, Charlotte's pouch created     - CRS primary  - regular diet per Primary  - nephrostomy tubes removed 10/1 - Cr stable, good UOP from ostomy  - continue PT/OT, OOBTC as tolerated, encourage ambulation  - Wound care ostomy consulted for assistance with ostomy teaching   - patient is a [employer]; management per primary and Heme-Onc (on EPO, B12, and iron)  - agree with ID recommendations: PICC line DC'd 9/28, cefepime for Enterobacter and flagyl for anaerobes  - ophthalmology consulted for candida retinitis - now on fluconazole     - to IR today for replacement of pelvic drain     - per ID recs, repeat CT in 2 weeks for re-evaluation of fluid collections and drain placement. Repeat sooner if patient develops clinical change. Continue cefepime and flagyl until repeat CT, continue fluconazole 400mg daily for minimum of 4 weeks for retinal lesion consistent w/ candida. Patient will need serial ophtho exams to help determine final duration of therapy.  - follow up in ID clinic in 2 weeks     -  Drains: maintain, IR drain (Cr 0.7), NTs  - Prophylaxis: IS, SCDs, GI ppx        VTE Risk Mitigation (From admission, onward)         Ordered     IP VTE HIGH RISK PATIENT  Once      09/11/20 2024     Place sequential compression device  Until discontinued      09/11/20 2024                Mana Wilks MD  Urology  [facility]

## 2020-10-05 NOTE — SUBJECTIVE & OBJECTIVE
Subjective:     Interval History: Patient seen and examined this am. No acute events overnight. Patient currently NPO, anticoagulation held for IR procedure today. Plan to discuss patient with social work today regarding insurance authorization. No new complaints at this time.     Post-Op Info:  Procedure(s) (LRB):  Nephrostogram - antegrade (Bilateral)  REMOVAL, STENT, URETER (Bilateral)   7 Days Post-Op      Medications:  Continuous Infusions:   electrolyte-A 1,000 mL (10/05/20 0528)     Scheduled Meds:   ceFEPime (MAXIPIME) IVPB  1 g Intravenous Q8H    cyanocobalamin  100 mcg Intramuscular Weekly    epoetin yecenia-epbx  20,000 Units Subcutaneous Q48H    fluconazole  400 mg Oral Daily    loperamide  2 mg Oral BID    metoprolol tartrate  25 mg Oral BID    metroNIDAZOLE  500 mg Oral Q8H    pantoprazole  40 mg Intravenous Daily    psyllium husk (aspartame)  1 packet Oral Daily    sodium chloride 0.9%  10 mL Intravenous Q6H     PRN Meds:   acetaminophen    dextrose 50%    glucagon (human recombinant)    HYDROmorphone    insulin aspart U-100    iohexol    iohexol    labetalol    ondansetron    oxyCODONE-acetaminophen    oxyCODONE-acetaminophen    sodium chloride 0.9%    sodium chloride 0.9%    sodium chloride 0.9%        Objective:     Vital Signs (Most Recent):  Temp: 96.7 °F (35.9 °C) (10/05/20 0514)  Pulse: 97 (10/05/20 0514)  Resp: (!) 22 (10/05/20 0514)  BP: (!) 145/75 (10/05/20 0514)  SpO2: 97 % (10/05/20 0514) Vital Signs (24h Range):  Temp:  [96.7 °F (35.9 °C)-97.6 °F (36.4 °C)] 96.7 °F (35.9 °C)  Pulse:  [] 97  Resp:  [17-33] 22  SpO2:  [95 %-98 %] 97 %  BP: (120-150)/(65-79) 145/75     Intake/Output - Last 3 Shifts       10/03 0700 - 10/04 0659 10/04 0700 - 10/05 0659    Urine (mL/kg/hr) 600 (0.3) 600 (0.3)    Drains 0     Other 100 50    Stool 800 280    Total Output 1500 930    Net -1500 -930                Physical Exam  Vitals signs and nursing note reviewed.    Constitutional:       Appearance: She is well-developed. She is not ill-appearing.   HENT:      Head: Normocephalic.   Eyes:      Pupils: Pupils are equal, round, and reactive to light.   Cardiovascular:      Rate and Rhythm: Normal rate and regular rhythm.      Heart sounds: Normal heart sounds.   Pulmonary:      Effort: Pulmonary effort is normal. No respiratory distress.      Breath sounds: Normal breath sounds. No wheezing or rales.   Abdominal:      Palpations: Abdomen is soft. There is no mass.      Tenderness: There is no guarding or rebound.      Comments: Ileostomy in place to RLQ with gas and stool within ostomy bag.  Urostomy bag in place to LLQ with clear, yellow urine  Midline wound vac remains in place with excellent seal  RLQ IR drain in place   Musculoskeletal: Normal range of motion.   Skin:     General: Skin is warm and dry.   Neurological:      Mental Status: She is alert and oriented to person, place, and time.   Psychiatric:         Behavior: Behavior normal.         Thought Content: Thought content normal.         Judgment: Judgment normal.           Significant Labs:  CBC (Last 3 Results):   Recent Labs   Lab 10/03/20  0308 10/04/20  0511 10/05/20  0306   WBC 17.74* 10.65 10.67   RBC 2.63* 2.60* 2.67*   HGB 7.0* 7.2* 6.9*   HCT 24.6* 24.3* 25.4*   * 484* 442*   MCV 94 94 95   MCH 26.6* 27.7 25.8*   MCHC 28.5* 29.6* 27.2*     CMP (Last 3 Results):   Recent Labs   Lab 10/03/20  0308 10/04/20  0511 10/05/20  0306    103 93   CALCIUM 8.3* 8.4* 8.4*   ALBUMIN 1.9* 1.8* 1.9*   PROT 6.7 6.4 6.7    141 141   K 3.6 3.4* 3.3*   CO2 27 27 25    103 104   BUN 12 12 11   CREATININE 1.2 0.9 0.9   ALKPHOS 178* 137* 118   ALT 8* 8* 7*   AST 18 10 12   BILITOT 0.4 0.3 0.3       Significant Diagnostics:  I have reviewed all pertinent imaging results/findings within the past 24 hours.

## 2020-10-05 NOTE — SUBJECTIVE & OBJECTIVE
Interval History:   NAEON, Cr stable   Tolerating PO with no N/V  Pain controlled, ambulating  NPO for IR procedure today       Objective:     Temp:  [96.7 °F (35.9 °C)-97.6 °F (36.4 °C)] 96.7 °F (35.9 °C)  Pulse:  [] 97  Resp:  [17-33] 22  SpO2:  [95 %-98 %] 97 %  BP: (120-150)/(65-79) 145/75     Body mass index is 28.94 kg/m².           Drains     Drain                 Urostomy 09/11/20 ileal conduit LLQ 21 days         Ileostomy 09/18/20 RLQ 14 days         Closed/Suction Drain 09/23/20 0720 Right Abdomen Bulb 10 Fr. 8 days         Nephrostomy 09/28/20 1210 Left 12 Fr. 3 days         Nephrostomy 09/28/20 1210 Right 12 Fr. 3 days                Physical Exam   Constitutional: No distress.   HENT:   Head: Normocephalic and atraumatic.   Eyes: Conjunctivae are normal. No scleral icterus.   Neck: Normal range of motion.   Cardiovascular: Normal rate.    Pulmonary/Chest: Effort normal. No respiratory distress.   Abdominal: Soft. She exhibits no distension. There is no abdominal tenderness. There is no rebound and no guarding.   Urostomy p/p/p draining clear yellow urine  Ileostomy output thickened   Midline incision with wound vac in place    Musculoskeletal: Normal range of motion.      Comments: SCDs in place   Neurological: She is alert.   Skin: Skin is warm and dry. She is not diaphoretic.         Significant Labs:    BMP:  Recent Labs   Lab 10/03/20  0308 10/04/20  0511 10/05/20  0306    141 141   K 3.6 3.4* 3.3*    103 104   CO2 27 27 25   BUN 12 12 11   CREATININE 1.2 0.9 0.9   CALCIUM 8.3* 8.4* 8.4*       CBC:   Recent Labs   Lab 10/03/20  0308 10/04/20  0511 10/05/20  0306   WBC 17.74* 10.65 10.67   HGB 7.0* 7.2* 6.9*   HCT 24.6* 24.3* 25.4*   * 484* 442*       All pertinent labs results from the past 24 hours have been reviewed.    Significant Imaging:  All pertinent imaging results/findings from the past 24 hours have been reviewed.              Review of Systems

## 2020-10-05 NOTE — ASSESSMENT & PLAN NOTE
S/p urinary diversion with colon conduit 9/11   S/p emergent ex-lap 9/17, colo-colic anastomosis intact, bowel perforation found, underwent resection of right colon, remaining transverses colon, and proximal descending colon, ileostomy creation, Charlotte's pouch created     - CRS primary  - regular diet per Primary  - nephrostomy tubes removed 10/1 - Cr stable, good UOP from ostomy  - continue PT/OT, OOBTC as tolerated, encourage ambulation  - Wound care ostomy consulted for assistance with ostomy teaching   - patient is a [employer]; management per primary and Heme-Onc (on EPO, B12, and iron)  - agree with ID recommendations: PICC line DC'd 9/28, cefepime for Enterobacter and flagyl for anaerobes  - ophthalmology consulted for candida retinitis - now on fluconazole     - to IR today for replacement of pelvic drain     - per ID recs, repeat CT in 2 weeks for re-evaluation of fluid collections and drain placement. Repeat sooner if patient develops clinical change. Continue cefepime and flagyl until repeat CT, continue fluconazole 400mg daily for minimum of 4 weeks for retinal lesion consistent w/ candida. Patient will need serial ophtho exams to help determine final duration of therapy.  - follow up in ID clinic in 2 weeks     - Drains: maintain, IR drain (Cr 0.7), NTs  - Prophylaxis: IS, SCDs, GI ppx

## 2020-10-05 NOTE — SEDATION DOCUMENTATION
Pt tolerated abscess drains placement well. VSS. Will recover in the ROCU before returning to her room.

## 2020-10-06 LAB
ALBUMIN SERPL BCP-MCNC: 2 G/DL (ref 3.5–5.2)
ALP SERPL-CCNC: 115 U/L (ref 55–135)
ALT SERPL W/O P-5'-P-CCNC: 8 U/L (ref 10–44)
ANION GAP SERPL CALC-SCNC: 14 MMOL/L (ref 8–16)
ANISOCYTOSIS BLD QL SMEAR: SLIGHT
AST SERPL-CCNC: 12 U/L (ref 10–40)
BASOPHILS # BLD AUTO: 0.03 K/UL (ref 0–0.2)
BASOPHILS NFR BLD: 0.2 % (ref 0–1.9)
BILIRUB SERPL-MCNC: 0.4 MG/DL (ref 0.1–1)
BUN SERPL-MCNC: 8 MG/DL (ref 6–20)
CALCIUM SERPL-MCNC: 8.4 MG/DL (ref 8.7–10.5)
CHLORIDE SERPL-SCNC: 105 MMOL/L (ref 95–110)
CO2 SERPL-SCNC: 24 MMOL/L (ref 23–29)
CREAT SERPL-MCNC: 0.9 MG/DL (ref 0.5–1.4)
CRP SERPL-MCNC: 176.7 MG/L (ref 0–8.2)
DIFFERENTIAL METHOD: ABNORMAL
EOSINOPHIL # BLD AUTO: 0.1 K/UL (ref 0–0.5)
EOSINOPHIL NFR BLD: 0.8 % (ref 0–8)
ERYTHROCYTE [DISTWIDTH] IN BLOOD BY AUTOMATED COUNT: 19.9 % (ref 11.5–14.5)
EST. GFR  (AFRICAN AMERICAN): >60 ML/MIN/1.73 M^2
EST. GFR  (NON AFRICAN AMERICAN): >60 ML/MIN/1.73 M^2
GIANT PLATELETS BLD QL SMEAR: PRESENT
GLUCOSE SERPL-MCNC: 103 MG/DL (ref 70–110)
HCT VFR BLD AUTO: 26.1 % (ref 37–48.5)
HGB BLD-MCNC: 7.6 G/DL (ref 12–16)
HYPOCHROMIA BLD QL SMEAR: ABNORMAL
IMM GRANULOCYTES # BLD AUTO: 0.12 K/UL (ref 0–0.04)
IMM GRANULOCYTES NFR BLD AUTO: 0.9 % (ref 0–0.5)
LACTATE SERPL-SCNC: 1 MMOL/L (ref 0.5–2.2)
LYMPHOCYTES # BLD AUTO: 1.1 K/UL (ref 1–4.8)
LYMPHOCYTES NFR BLD: 7.9 % (ref 18–48)
MAGNESIUM SERPL-MCNC: 1.4 MG/DL (ref 1.6–2.6)
MCH RBC QN AUTO: 26.9 PG (ref 27–31)
MCHC RBC AUTO-ENTMCNC: 29.1 G/DL (ref 32–36)
MCV RBC AUTO: 92 FL (ref 82–98)
MONOCYTES # BLD AUTO: 0.9 K/UL (ref 0.3–1)
MONOCYTES NFR BLD: 6.3 % (ref 4–15)
NEUTROPHILS # BLD AUTO: 11.6 K/UL (ref 1.8–7.7)
NEUTROPHILS NFR BLD: 83.9 % (ref 38–73)
NRBC BLD-RTO: 1 /100 WBC
OVALOCYTES BLD QL SMEAR: ABNORMAL
PHOSPHATE SERPL-MCNC: 2.5 MG/DL (ref 2.7–4.5)
PLATELET # BLD AUTO: 422 K/UL (ref 150–350)
PMV BLD AUTO: 9.3 FL (ref 9.2–12.9)
POCT GLUCOSE: 116 MG/DL (ref 70–110)
POIKILOCYTOSIS BLD QL SMEAR: SLIGHT
POLYCHROMASIA BLD QL SMEAR: ABNORMAL
POTASSIUM SERPL-SCNC: 3.4 MMOL/L (ref 3.5–5.1)
PROT SERPL-MCNC: 7 G/DL (ref 6–8.4)
RBC # BLD AUTO: 2.83 M/UL (ref 4–5.4)
SODIUM SERPL-SCNC: 143 MMOL/L (ref 136–145)
WBC # BLD AUTO: 13.79 K/UL (ref 3.9–12.7)

## 2020-10-06 PROCEDURE — 25000003 PHARM REV CODE 250: Performed by: COLON & RECTAL SURGERY

## 2020-10-06 PROCEDURE — 97535 SELF CARE MNGMENT TRAINING: CPT

## 2020-10-06 PROCEDURE — 25000003 PHARM REV CODE 250: Performed by: STUDENT IN AN ORGANIZED HEALTH CARE EDUCATION/TRAINING PROGRAM

## 2020-10-06 PROCEDURE — 36415 COLL VENOUS BLD VENIPUNCTURE: CPT

## 2020-10-06 PROCEDURE — 83605 ASSAY OF LACTIC ACID: CPT

## 2020-10-06 PROCEDURE — 97116 GAIT TRAINING THERAPY: CPT | Mod: CQ

## 2020-10-06 PROCEDURE — A4216 STERILE WATER/SALINE, 10 ML: HCPCS | Performed by: STUDENT IN AN ORGANIZED HEALTH CARE EDUCATION/TRAINING PROGRAM

## 2020-10-06 PROCEDURE — 87040 BLOOD CULTURE FOR BACTERIA: CPT

## 2020-10-06 PROCEDURE — 11000001 HC ACUTE MED/SURG PRIVATE ROOM

## 2020-10-06 PROCEDURE — 25000003 PHARM REV CODE 250: Performed by: NURSE PRACTITIONER

## 2020-10-06 PROCEDURE — 85025 COMPLETE CBC W/AUTO DIFF WBC: CPT

## 2020-10-06 PROCEDURE — 80053 COMPREHEN METABOLIC PANEL: CPT

## 2020-10-06 PROCEDURE — C9113 INJ PANTOPRAZOLE SODIUM, VIA: HCPCS | Performed by: STUDENT IN AN ORGANIZED HEALTH CARE EDUCATION/TRAINING PROGRAM

## 2020-10-06 PROCEDURE — 97110 THERAPEUTIC EXERCISES: CPT

## 2020-10-06 PROCEDURE — 63600175 PHARM REV CODE 636 W HCPCS: Performed by: STUDENT IN AN ORGANIZED HEALTH CARE EDUCATION/TRAINING PROGRAM

## 2020-10-06 PROCEDURE — 86140 C-REACTIVE PROTEIN: CPT

## 2020-10-06 PROCEDURE — 83735 ASSAY OF MAGNESIUM: CPT

## 2020-10-06 PROCEDURE — 84100 ASSAY OF PHOSPHORUS: CPT

## 2020-10-06 RX ORDER — ACETAMINOPHEN 650 MG/1
650 SUPPOSITORY RECTAL EVERY 6 HOURS PRN
Status: DISCONTINUED | OUTPATIENT
Start: 2020-10-06 | End: 2020-10-13 | Stop reason: HOSPADM

## 2020-10-06 RX ORDER — ENOXAPARIN SODIUM 100 MG/ML
40 INJECTION SUBCUTANEOUS EVERY 24 HOURS
Status: DISCONTINUED | OUTPATIENT
Start: 2020-10-06 | End: 2020-10-13 | Stop reason: HOSPADM

## 2020-10-06 RX ADMIN — ACETAMINOPHEN 650 MG: 325 TABLET ORAL at 05:10

## 2020-10-06 RX ADMIN — CEFEPIME 1 G: 1 INJECTION, POWDER, FOR SOLUTION INTRAMUSCULAR; INTRAVENOUS at 11:10

## 2020-10-06 RX ADMIN — Medication 10 ML: at 06:10

## 2020-10-06 RX ADMIN — Medication 10 ML: at 12:10

## 2020-10-06 RX ADMIN — CEFEPIME 1 G: 1 INJECTION, POWDER, FOR SOLUTION INTRAMUSCULAR; INTRAVENOUS at 08:10

## 2020-10-06 RX ADMIN — CEFEPIME 1 G: 1 INJECTION, POWDER, FOR SOLUTION INTRAMUSCULAR; INTRAVENOUS at 06:10

## 2020-10-06 RX ADMIN — CEFEPIME 1 G: 1 INJECTION, POWDER, FOR SOLUTION INTRAMUSCULAR; INTRAVENOUS at 12:10

## 2020-10-06 RX ADMIN — LOPERAMIDE HYDROCHLORIDE 2 MG: 2 CAPSULE ORAL at 09:10

## 2020-10-06 RX ADMIN — METRONIDAZOLE 500 MG: 500 TABLET ORAL at 09:10

## 2020-10-06 RX ADMIN — METOPROLOL TARTRATE 25 MG: 25 TABLET, FILM COATED ORAL at 09:10

## 2020-10-06 RX ADMIN — PANTOPRAZOLE SODIUM 40 MG: 40 INJECTION, POWDER, LYOPHILIZED, FOR SOLUTION INTRAVENOUS at 08:10

## 2020-10-06 RX ADMIN — SODIUM CHLORIDE, SODIUM GLUCONATE, SODIUM ACETATE, POTASSIUM CHLORIDE AND MAGNESIUM CHLORIDE 1000 ML: 526; 502; 368; 37; 30 INJECTION, SOLUTION INTRAVENOUS at 06:10

## 2020-10-06 RX ADMIN — Medication 10 ML: at 11:10

## 2020-10-06 RX ADMIN — ENOXAPARIN SODIUM 40 MG: 100 INJECTION SUBCUTANEOUS at 06:10

## 2020-10-06 RX ADMIN — Medication 10 MG: at 12:10

## 2020-10-06 NOTE — PT/OT/SLP PROGRESS
Physical Therapy Treatment    Patient Name:  Edita Riley   MRN:  [id number]    Recommendations:     Discharge Recommendations:  rehabilitation facility   Discharge Equipment Recommendations: bath bench, walker, rolling   Barriers to discharge: Decreased caregiver support and increased risk for all mobility and self care    Assessment:     Edita Riley is a 55 y.o. female admitted with a medical diagnosis of Bilateral ureteral obstruction.  She presents with the following impairments/functional limitations:  weakness, impaired endurance, impaired self care skills, impaired functional mobilty.    Rehab Prognosis: Good; patient would benefit from acute skilled PT services to address these deficits and reach maximum level of function.    Recent Surgery: Procedure(s) (LRB):  Nephrostogram - antegrade (Bilateral)  REMOVAL, STENT, URETER (Bilateral) 8 Days Post-Op    Plan:     During this hospitalization, patient to be seen 4 x/week to address the identified rehab impairments via therapeutic activities, therapeutic exercises, gait training and progress toward the following goals:    · Plan of Care Expires:  11/21/20    Subjective     Chief Complaint: none  Patient/Family Comments/goals: Reports she feels better than yesterday  Pain/Comfort:  · Pain Rating 1: 0/10      Objective:     Communicated with nurse prior to session.  Patient found up in chair with wound vac, peripheral IV, ORESTES drain, colostomy upon PT entry to room.     General Precautions: Standard, fall   Orthopedic Precautions:N/A   Braces: N/A     Functional Mobility:  · Transfers:     · Sit to Stand:  stand by assistance with rolling walker  · Gait: 60 ft then 85 ft with RW with chair f/u with CGA/SBA. one seated rest, slow karma, fatigued.       AM-PAC 6 CLICK MOBILITY  Turning over in bed (including adjusting bedclothes, sheets and blankets)?: 3  Sitting down on and standing up from a chair with arms (e.g., wheelchair, bedside  commode, etc.): 3  Moving from lying on back to sitting on the side of the bed?: 3  Moving to and from a bed to a chair (including a wheelchair)?: 3  Need to walk in hospital room?: 3  Climbing 3-5 steps with a railing?: 2  Basic Mobility Total Score: 17       Therapeutic Activities and Exercises:   white board updated  Patient making good progress, very motivated, will benefit from IPR stay to continue to improve strength and mobility once medically cleared.   Co tx with OT to ensure patient safety    Patient Education:    Patient educated on Fall risk, home safety, Home exercise program and plan of care by explanation, demonstration and handout.  Patient was receptive to education and verbalizes understanding.      Patient left up in chair with all lines intact, call button in reach and nurse notified..    GOALS:   Multidisciplinary Problems     Physical Therapy Goals        Problem: Physical Therapy Goal    Goal Priority Disciplines Outcome Goal Variances Interventions   Physical Therapy Goal     PT, PT/OT Ongoing, Progressing     Description: Patient re-evaluated 20 s/p further surgery.  Goals to be met by: 10/14/2020    Patient will increase functional independence with mobility by performin. Supine to sit with Set-up [place]  2. Sit to supine with Set-up [place]  3. Sit to stand transfer with Supervision  4. Bed to chair transfer with Supervision using Rolling Walker  5. Gait  x 250 feet with Stand-by Assistance using Rolling Walker.   6. Ascend/descend 3 stair with left Handrails Stand-by Assistance                   Time Tracking:     PT Received On: 10/06/20  PT Start Time: 1141     PT Stop Time: 1204  PT Total Time (min): 23 min     Billable Minutes: Gait Training 23    Treatment Type: Treatment  PT/PTA: PTA     PTA Visit Number: 3     Genevieve Boston PTA  10/06/2020

## 2020-10-06 NOTE — PLAN OF CARE
10/06/20 1110   Post-Acute Status   Post-Acute Authorization Placement   Post-Acute Placement Status Referrals Sent     [facility]. [facility], [facility], [facility], and [facility] via  for review. SW will follow up as needed.    Kira Dawkins LMSW  Case Management   [facility]   Ext. [phone]

## 2020-10-06 NOTE — SUBJECTIVE & OBJECTIVE
Subjective:     Interval History: Febrile and tachycardic overnight. Had IR drains placed in abscess yesterday. Ostomy and urostomy working well. Wound vac to midline incision.    Post-Op Info:  Procedure(s) (LRB):  Nephrostogram - antegrade (Bilateral)  REMOVAL, STENT, URETER (Bilateral)   8 Days Post-Op      Medications:  Continuous Infusions:   electrolyte-A 1,000 mL (10/05/20 1746)     Scheduled Meds:   ceFEPime (MAXIPIME) IVPB  1 g Intravenous Q8H    cyanocobalamin  100 mcg Intramuscular Weekly    epoetin yecenia-epbx  20,000 Units Subcutaneous Q48H    fluconazole  400 mg Oral Daily    loperamide  2 mg Oral BID    metoprolol tartrate  25 mg Oral BID    metroNIDAZOLE  500 mg Oral Q8H    pantoprazole  40 mg Intravenous Daily    psyllium husk (aspartame)  1 packet Oral Daily    sodium chloride 0.9%  10 mL Intravenous Q6H     PRN Meds:   acetaminophen    acetaminophen    dextrose 50%    glucagon (human recombinant)    HYDROmorphone    insulin aspart U-100    iohexol    iohexol    labetalol    ondansetron    oxyCODONE-acetaminophen    oxyCODONE-acetaminophen    sodium chloride 0.9%    sodium chloride 0.9%    sodium chloride 0.9%        Objective:     Vital Signs (Most Recent):  Temp: 97.3 °F (36.3 °C) (10/06/20 0734)  Pulse: (!) 112 (10/06/20 0734)  Resp: 18 (10/06/20 0734)  BP: 135/62 (10/06/20 0734)  SpO2: (!) 94 % (10/06/20 0734) Vital Signs (24h Range):  Temp:  [97.3 °F (36.3 °C)-101.7 °F (38.7 °C)] 97.3 °F (36.3 °C)  Pulse:  [] 112  Resp:  [12-26] 18  SpO2:  [92 %-100 %] 94 %  BP: (130-175)/(59-83) 135/62     Intake/Output - Last 3 Shifts       10/04 0700 - 10/05 0659 10/05 0700 - 10/06 0659 10/06 0700 - 10/07 0659    Urine (mL/kg/hr) 600 (0.3) 850 (0.4)     Drains  70     Other 50      Stool 280 550     Total Output 930 1470     Net -930 -1470                  Physical Exam  Vitals signs and nursing note reviewed.   Constitutional:       Appearance: She is well-developed. She is  not ill-appearing.   HENT:      Head: Normocephalic.   Eyes:      Pupils: Pupils are equal, round, and reactive to light.   Cardiovascular:      Rate and Rhythm: Normal rate and regular rhythm.      Heart sounds: Normal heart sounds.   Pulmonary:      Effort: Pulmonary effort is normal. No respiratory distress.      Breath sounds: Normal breath sounds. No wheezing or rales.   Abdominal:      Palpations: Abdomen is soft. There is no mass.      Tenderness: There is no guarding or rebound.      Comments: Ileostomy in place to RLQ with gas and stool within ostomy bag.  Urostomy bag in place to LLQ with clear, yellow urine  Midline wound vac remains in place with excellent seal  RLQ IR drain x3 in place   Musculoskeletal: Normal range of motion.   Skin:     General: Skin is warm and dry.   Neurological:      Mental Status: She is alert and oriented to person, place, and time.   Psychiatric:         Behavior: Behavior normal.         Thought Content: Thought content normal.         Judgment: Judgment normal.       Significant Labs:  CBC (Last 3 Results):   Recent Labs   Lab 10/04/20  0511 10/05/20  0306 10/06/20  0428   WBC 10.65 10.67 13.79*   RBC 2.60* 2.67* 2.83*   HGB 7.2* 6.9* 7.6*   HCT 24.3* 25.4* 26.1*   * 442* 422*   MCV 94 95 92   MCH 27.7 25.8* 26.9*   MCHC 29.6* 27.2* 29.1*     CMP (Last 3 Results):   Recent Labs   Lab 10/04/20  0511 10/05/20  0306 10/06/20  0428    93 103   CALCIUM 8.4* 8.4* 8.4*   ALBUMIN 1.8* 1.9* 2.0*   PROT 6.4 6.7 7.0    141 143   K 3.4* 3.3* 3.4*   CO2 27 25 24    104 105   BUN 12 11 8   CREATININE 0.9 0.9 0.9   ALKPHOS 137* 118 115   ALT 8* 7* 8*   AST 10 12 12   BILITOT 0.3 0.3 0.4

## 2020-10-06 NOTE — CARE UPDATE
"RAPID RESPONSE NURSE PROACTIVE ROUNDING NOTE     Time of Visit: 0545    Admit Date: 2020  LOS: 25  Code Status: Full Code   Date of Visit: 10/06/2020  : 1965  Age: 55 y.o.  Sex: female  Race: Black or   [facility]:   MRN: [id number]  Was the patient discharged from an ICU this admission? no   Was the patient discharged from a PACU within last 24 hours?  no  Did the patient receive conscious sedation/general anesthesia in last 24 hours?  no  Was the patient in the ED within the past 24 hours?  no  Was the patient started on NIPPV within the past 24 hours?  no  Attending Physician: Hammad Reynolds MD  Primary Service: Networked reference to record PCT     ASSESSMENT     Notified by charge RN via phone call.  Reason for alert: new fever, tachycardia    Diagnosis: Bilateral ureteral obstruction    Abnormal Vital Signs: BP (!) 171/82 (BP Location: Right arm)   Pulse (!) 111   Temp (!) 101.7 °F (38.7 °C) (Oral)   Resp (!) 26   Ht 5' 9" (1.753 m)   Wt 88.9 kg (196 lb)   LMP 2014 (Approximate)   SpO2 (!) 92%   Breastfeeding No   BMI 28.94 kg/m²      Clinical Issues: fever, tachycardia     Patient  has a past medical history of Abnormal mammogram, Anxiety, Cervical cancer, Chronic back pain, Depression, Diarrhea due to malabsorption, Fibromyalgia, Generalized abdominal pain, GERD (gastroesophageal reflux disease), Hiatal hernia, History of cervical cancer, History of cervical cancer, psychiatric care, Hypertension, Hypomagnesemia, Lactose intolerance, Metastatic squamous cell carcinoma to lymph node, Nephrostomy tube displaced, 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 drug, Osteoarthritis of back, Psychiatric problem, and Stroke.      Notified of patient by charge RN, patient with new onset fever of 101.7 and tachycardia into the 120s. Patient had drains placed in IR yesterday for abdominal/pelvic abscess. Tylenol administered. RRN notified Dr. Beverly, blood cultures and lactic acid " ordered.     INTERVENTIONS/ RECOMMENDATIONS     Obtain blood cultures, lactic acid, PRN tylenol.    Discussed plan of care with RN, Trace.    PHYSICIAN ESCALATION     Yes/No  yes    Orders received and case discussed with Dr. Beverly.    Disposition: Remain in room 524.    FOLLOW-UP     Call back the Rapid Response Nurse, Chirag Willson RN at [phone] for additional questions or concerns.

## 2020-10-06 NOTE — ASSESSMENT & PLAN NOTE
S/p urinary diversion with colon conduit 9/11   S/p emergent ex-lap 9/17, colo-colic anastomosis intact, bowel perforation found, underwent resection of right colon, remaining transverses colon, and proximal descending colon, ileostomy creation, Charlotte's pouch created     - CRS primary  - regular diet per Primary  - nephrostomy tubes removed 10/1 - Cr stable, good UOP from ostomy  - continue PT/OT, OOBTC as tolerated, encourage ambulation  - Wound care ostomy consulted for assistance with ostomy teaching   - patient is a [employer]; management per primary and Heme-Onc (on EPO, B12, and iron)  - agree with ID recommendations: PICC line DC'd 9/28, cefepime for Enterobacter and flagyl for anaerobes  - ophthalmology consulted for candida retinitis - now on fluconazole     - IR placed new drain yesterday. Gram stain negative. Follow up IR drain cultures.     - per ID recs, repeat CT in 2 weeks for re-evaluation of fluid collections and drain placement. Repeat sooner if patient develops clinical change. Continue cefepime and flagyl until repeat CT, continue fluconazole 400mg daily for minimum of 4 weeks for retinal lesion consistent w/ candida. Patient will need serial ophtho exams to help determine final duration of therapy.  - follow up in ID clinic in 2 weeks     - Drains: maintain IR drains   - Prophylaxis: IS, SCDs, GI ppx

## 2020-10-06 NOTE — PROGRESS NOTES
Progress Note  Ophthalmology      SUBJECTIVE:   Interval history:    Patient seen this morning. She continues to deny any ocular complaints including decreased vision, floaters, flashes, pain, discharge, photophobia.     Meds:   Current Facility-Administered Medications:     acetaminophen suppository 650 mg, 650 mg, Rectal, Q6H PRN, Karina Beverly MD    acetaminophen tablet 650 mg, 650 mg, Oral, Q6H PRN, Kerri Castillo NP, 650 mg at 10/06/20 0502    ceFEPIme injection 1 g, 1 g, Intravenous, Q8H, Marie Meneses MD, 1 g at 10/06/20 0854    cyanocobalamin injection 100 mcg, 100 mcg, Intramuscular, Weekly, Anoop Arevalo MD, 100 mcg at 10/03/20 0907    dextrose 50% injection 12.5 g, 12.5 g, Intravenous, PRN, Kerri Castillo NP    electrolyte-A infusion 1,000 mL, 1,000 mL, Intravenous, Continuous, Hammad Reynolds MD, Last Rate: 100 mL/hr at 10/05/20 1746, 1,000 mL at 10/05/20 1746    enoxaparin injection 40 mg, 40 mg, Subcutaneous, Q24H, Tho Hernandez MD    epoetin yecenia-epbx injection 20,000 Units, 20,000 Units, Subcutaneous, Q48H, Nicky Madsen MD, 20,000 Units at 10/05/20 2236    fluconazole tablet 400 mg, 400 mg, Oral, Daily, Michelle Mohamud MD, 400 mg at 10/05/20 0834    glucagon (human recombinant) injection 1 mg, 1 mg, Intramuscular, PRN, Kerri Castillo NP    HYDROmorphone injection 0.5 mg, 0.5 mg, Intravenous, Q6H PRN, Andria Hua NP    insulin aspart U-100 pen 1-10 Units, 1-10 Units, Subcutaneous, Q4H PRN, Kerri Castillo NP, 4 Units at 09/28/20 1727    iohexol (OMNIPAQUE) oral solution 15 mL, 15 mL, Oral, PRN, Emelina Carlson MD, 15 mL at 10/01/20 0833    iohexol (OMNIPAQUE) oral solution 15 mL, 15 mL, Oral, PRN, Bro Brambila MD    labetalol 20 mg/4 mL (5 mg/mL) IV syring, 10 mg, Intravenous, Q4H PRN, Fransisco Espnio MD, 10 mg at 10/06/20 0033    loperamide capsule 2 mg, 2 mg, Oral, BID, Cecile Francisco MD, 2 mg at 10/05/20 0834     metoprolol tartrate (LOPRESSOR) tablet 25 mg, 25 mg, Oral, BID, Kerri Castillo NP, 25 mg at 10/06/20 0901    metroNIDAZOLE tablet 500 mg, 500 mg, Oral, Q8H, Marie Meneses MD, Stopped at 10/06/20 0600    ondansetron injection 4 mg, 4 mg, Intravenous, Q6H PRN, Kerri Castillo NP, 4 mg at 10/01/20 1027    oxyCODONE-acetaminophen  mg per tablet 1 tablet, 1 tablet, Oral, Q4H PRN, Andria Hua NP    oxyCODONE-acetaminophen 5-325 mg per tablet 1 tablet, 1 tablet, Oral, Q4H PRN, Andria Hua NP    pantoprazole injection 40 mg, 40 mg, Intravenous, Daily, Moe Mclean MD, 40 mg at 10/06/20 0854    psyllium husk (aspartame) 3.4 gram packet 1 packet, 1 packet, Oral, Daily, Kerri Castillo NP, 1 packet at 10/05/20 0837    sodium chloride 0.9% flush 10 mL, 10 mL, Intravenous, PRN, Mana Wilks MD    Flushing PICC Protocol, , , Until Discontinued **AND** sodium chloride 0.9% flush 10 mL, 10 mL, Intravenous, Q6H, 10 mL at 10/06/20 0600 **AND** sodium chloride 0.9% flush 10 mL, 10 mL, Intravenous, PRN, Moe Mclean MD    sodium chloride 0.9% flush 3 mL, 3 mL, Intravenous, PRN, Mana Wilks MD    OBJECTIVE:       Vital Signs (Most Recent):  Vitals:    10/06/20 0734   BP: 135/62   Pulse: (!) 112   Resp: 18   Temp: 97.3 °F (36.3 °C)         Eye Examination:    Base Eye Exam     Visual Acuity       Right Left    Near sc 20/60 20/60    Near cc 20/25-1 30/30          Tonometry (Tonopen, 1:20 PM)       Right Left    Pressure 14 16          Pupils       Dark Light Shape React APD    Right 3 2 Round Brisk None    Left 3 2 Round Brisk None          Visual Fields       Right Left     Full Full          Extraocular Movement       Right Left     -2 -2 -2   --  --   -- -- --    -2 -2 -2   --  --   -- -- --             Neuro/Psych     Oriented x3: Yes    Mood/Affect: Normal   Drowsy            Dilation     Both eyes: 1% Mydriacyl, 2.5% Phenylephrine @ 1:20  PM            Slit Lamp and Fundus Exam     External Exam       Right Left    External Normal Normal          Pen Light Exam       Right Left    Lids/Lashes Normal Ptosis     Conjunctiva/Sclera White and quiet White and quiet    Cornea Clear Clear    Anterior Chamber Deep and quiet Deep and quiet    Iris Round and reactive Round and reactive    Lens Clear Clear    Vitreous Normal Normal. No haze or cell.           Fundus Exam       Right Left    Disc Normal Pink, sharp. Small yellow, fluffy lesion along superior arcade, obscuring section of vessel.     C/D Ratio 0.4 0.4    Macula Normal Normal    Vessels Normal Normal    Periphery Normal Normal. No other lesions noted.                 ASSESSMENT/PLAN:     1. Candida Retinitis, left eye   - + blood cultures for Candida   - Exam with Va 20/30 left eye. No evidence of AC reax. No hypopyon. Small, yellow creamy lesion along superior arcade without surrounding changes. No other lesions noted. Vitreous clear without haze or cell.   - Recommend continuing systemic antifungal with adequate ocular penetration such as fluconazole   - Will likely need extended course of antifungal treatment due to concern for possible chorioretinal seeding.   - Lesion continues to remain stable. No need for invasive intervention at this time.   - We will continue to follow patient during treatment     Fransisco Conway MD PGY-2  [facility]   10/6/2020  10:35 AM

## 2020-10-06 NOTE — PROGRESS NOTES
Wound care follow up:     Wound vac dressing change completed today. Wounds with much improvement: increased granulation tissue to wound bed and pink epithelium to wound edges. Patient tolerated dressing change and will continue with dressing changes as scheduled- 2x/week. Ostomy pouches intact. Ostomy care to continue to follow pt PRN [id number]    Wound assessment:   1. Upper midline abdominal wound measuring 7.5 x 4.5 x 1.2 cm with 3 cm  tunneling @ 12 o'clock- Full thickness with primarily red granulation tissue. Minimal serosanguineous drainage. No odor.     2. Middle abdominal wound measuring 3 x 2 x 1 cm with 1.8 cm tunneling @ 12 o'clock- Full thickness with red granulation tissue to wound bed. Moderate serosanguineous drainage. No odor.     3. Lower midline abdominal wound measuring 5.5 x 3 x 1.5 cm. Full thickness and mostly red granulation tissue. Moderate serosanguineous drainage. No odor.

## 2020-10-06 NOTE — PT/OT/SLP PROGRESS
"Physical Therapy Treatment    Patient Name:  Edita Riley   MRN:  [id number]    Recommendations:     Discharge Recommendations:  rehabilitation facility   Discharge Equipment Recommendations: bath bench, walker, rolling   Barriers to discharge: Decreased caregiver support    Assessment:     Edita Riley is a 55 y.o. female admitted with a medical diagnosis of Bilateral ureteral obstruction.  She presents with the following impairments/functional limitations:  weakness.  Patient making good progress with therapy and is very motivated. She is always willing to walk and mobilize but today she was weak and fatigued. Tolerated walking 2 trials in fonseca with long seated rest due to fatigue and SOB. 02 sats remained normal on RA. Will benefit from IPR stay to improve mobility safety and strength.     Rehab Prognosis: Good; patient would benefit from acute skilled PT services to address these deficits and reach maximum level of function.    Recent Surgery: Procedure(s) (LRB):  Nephrostogram - antegrade (Bilateral)  REMOVAL, STENT, URETER (Bilateral) 8 Days Post-Op    Plan:     During this hospitalization, patient to be seen 4 x/week to address the identified rehab impairments via gait training, therapeutic activities, therapeutic exercises and progress toward the following goals:    · Plan of Care Expires:  11/21/20    Subjective     Chief Complaint: fatigue, weakness  Patient/Family Comments/goals: "I feel really tired and hungry" Agreeable to therapy  Pain/Comfort:         Objective:     Communicated with nurse prior to session.  Patient found supine with wound vac, peripheral IV, ORESTES drain, colostomy upon PT entry to room.     General Precautions: Standard, fall   Orthopedic Precautions:N/A   Braces:   none    Functional Mobility:  · Bed Mobility:     · Supine to Sit: minimum assistance  · Sit to Supine: minimum assistance  · Transfers:     · Sit to Stand:  minimum assistance with rolling " walker  · Gait: 50 ft x 2 trials with RW with CGA and chair f/u. Slow karma, prolonged seated rest, SOB, decreased foot clearance and step length.           Therapeutic Activities and Exercises:   white board updated  Initially upon sitting up patient ostomy leaked over gown and bed linens, assisted patient and nurse with bag change and clean up.     Patient left supine with all lines intact and call button in reach..    GOALS:   Multidisciplinary Problems     Physical Therapy Goals        Problem: Physical Therapy Goal    Goal Priority Disciplines Outcome Goal Variances Interventions   Physical Therapy Goal     PT, PT/OT Ongoing, Progressing     Description: Patient re-evaluated 20 s/p further surgery.  Goals to be met by: 10/14/2020    Patient will increase functional independence with mobility by performin. Supine to sit with Set-up [place]  2. Sit to supine with Set-up [place]  3. Sit to stand transfer with Supervision  4. Bed to chair transfer with Supervision using Rolling Walker  5. Gait  x 250 feet with Stand-by Assistance using Rolling Walker.   6. Ascend/descend 3 stair with left Handrails Stand-by Assistance                   Time Tracking:     PT Received On: 10/05/20  PT Start Time: 335     PT Stop Time: 443  PT Total Time (min): 68 min     Billable Minutes: Gait Training 38 and Therapeutic Activity 30    Treatment Type: Treatment  PT/PTA: PTA     PTA Visit Number: 2     Genevieve Boston, CARMEN  10/06/2020

## 2020-10-06 NOTE — SUBJECTIVE & OBJECTIVE
Interval History:   Febrile to 101.7 this morning. Tachycardic to 111, BP stable.   Urostomy and ileostomy with good output. Leukocytosis to 13 on am labs. CMP in process. Emesis x1. Patient says it was a small output emesis. She feels well overall.       Objective:     Temp:  [97.8 °F (36.6 °C)-101.7 °F (38.7 °C)] 100.8 °F (38.2 °C)  Pulse:  [] 113  Resp:  [12-26] 26  SpO2:  [92 %-100 %] 97 %  BP: (130-175)/(59-83) 171/82     Body mass index is 28.94 kg/m².           Drains     Drain                 Urostomy 09/11/20 ileal conduit LLQ 25 days         Ileostomy 09/18/20 RLQ 18 days         Closed/Suction Drain 10/05/20 1038 Right;Superior Abdomen Bulb 10 Fr. less than 1 day         Closed/Suction Drain 10/05/20 1039 Right;Inferior Bulb 10 Fr. less than 1 day                Physical Exam   Constitutional: No distress.   HENT:   Head: Normocephalic and atraumatic.   Eyes: Conjunctivae are normal. No scleral icterus.   Neck: Normal range of motion.   Cardiovascular: Normal rate.    Pulmonary/Chest: Effort normal. No respiratory distress.   Abdominal: Soft. She exhibits no distension. There is no abdominal tenderness. There is no rebound and no guarding.   Urostomy p/p/p draining clear yellow urine  Ileostomy output thickened   Midline incision with wound vac in place   Right superior drain with thin green output.   Right Inferior drain with thin green output.    Musculoskeletal: Normal range of motion.      Comments: SCDs in place   Neurological: She is alert.   Skin: Skin is warm and dry. She is not diaphoretic.         Significant Labs:    BMP:  Recent Labs   Lab 10/03/20  0308 10/04/20  0511 10/05/20  0306    141 141   K 3.6 3.4* 3.3*    103 104   CO2 27 27 25   BUN 12 12 11   CREATININE 1.2 0.9 0.9   CALCIUM 8.3* 8.4* 8.4*       CBC:   Recent Labs   Lab 10/04/20  0511 10/05/20  0306 10/06/20  0428   WBC 10.65 10.67 13.79*   HGB 7.2* 6.9* 7.6*   HCT 24.3* 25.4* 26.1*   * 442* 422*        Blood Culture:   Recent Labs   Lab 09/29/20  0749 09/29/20  0812   LABBLOO No growth after 5 days. No growth after 5 days.     Urine Culture: No results for input(s): LABURIN in the last 168 hours.  Urine Studies: No results for input(s): COLORU, APPEARANCEUA, PHUR, SPECGRAV, PROTEINUA, GLUCUA, KETONESU, BILIRUBINUA, OCCULTUA, NITRITE, UROBILINOGEN, LEUKOCYTESUR, RBCUA, WBCUA, BACTERIA, SQUAMEPITHEL, HYALINECASTS in the last 168 hours.    Invalid input(s): WRIGHTSUR    Significant Imaging:  All pertinent imaging results/findings from the past 24 hours have been reviewed.

## 2020-10-06 NOTE — PROGRESS NOTES
[facility]  Urology  Progress Note    Patient Name: Edita Riley  MRN: [id number]  Admission Date: 9/11/2020  Hospital Length of Stay: 25 days  Code Status: Full Code   Attending Provider: Hammad Reynolds MD   Primary Care Physician: Audrey Mustafa MD    Subjective:     HPI:  Edita Riley is a 55 y.o. female with a history of cervical cancer s/p pelvic radiation. She has since developed bilateral ureteral strictures and bilateral hydronephrosis R>L. She had a MAG3 scan confirmed presence of bilateral obstruction. She is s/p open transverse colon conduit urinary diversion on 9/11/2020.     Interval History:   Febrile to 101.7 this morning. Tachycardic to 111, BP stable.   Urostomy and ileostomy with good output. Leukocytosis to 13 on am labs. CMP in process. Emesis x1. Patient says it was a small output emesis. She feels well overall.       Objective:     Temp:  [97.8 °F (36.6 °C)-101.7 °F (38.7 °C)] 100.8 °F (38.2 °C)  Pulse:  [] 113  Resp:  [12-26] 26  SpO2:  [92 %-100 %] 97 %  BP: (130-175)/(59-83) 171/82     Body mass index is 28.94 kg/m².           Drains     Drain                 Urostomy 09/11/20 ileal conduit LLQ 25 days         Ileostomy 09/18/20 RLQ 18 days         Closed/Suction Drain 10/05/20 1038 Right;Superior Abdomen Bulb 10 Fr. less than 1 day         Closed/Suction Drain 10/05/20 1039 Right;Inferior Bulb 10 Fr. less than 1 day                Physical Exam   Constitutional: No distress.   HENT:   Head: Normocephalic and atraumatic.   Eyes: Conjunctivae are normal. No scleral icterus.   Neck: Normal range of motion.   Cardiovascular: Normal rate.    Pulmonary/Chest: Effort normal. No respiratory distress.   Abdominal: Soft. She exhibits no distension. There is no abdominal tenderness. There is no rebound and no guarding.   Urostomy p/p/p draining clear yellow urine  Ileostomy output thickened   Midline incision with wound vac in place   Right superior  drain with thin green output.   Right Inferior drain with thin green output.    Musculoskeletal: Normal range of motion.      Comments: SCDs in place   Neurological: She is alert.   Skin: Skin is warm and dry. She is not diaphoretic.         Significant Labs:    BMP:  Recent Labs   Lab 10/03/20  0308 10/04/20  0511 10/05/20  0306    141 141   K 3.6 3.4* 3.3*    103 104   CO2 27 27 25   BUN 12 12 11   CREATININE 1.2 0.9 0.9   CALCIUM 8.3* 8.4* 8.4*       CBC:   Recent Labs   Lab 10/04/20  0511 10/05/20  0306 10/06/20  0428   WBC 10.65 10.67 13.79*   HGB 7.2* 6.9* 7.6*   HCT 24.3* 25.4* 26.1*   * 442* 422*       Blood Culture:   Recent Labs   Lab 09/29/20  0749 09/29/20  0812   LABBLOO No growth after 5 days. No growth after 5 days.     Urine Culture: No results for input(s): LABURIN in the last 168 hours.  Urine Studies: No results for input(s): COLORU, APPEARANCEUA, PHUR, SPECGRAV, PROTEINUA, GLUCUA, KETONESU, BILIRUBINUA, OCCULTUA, NITRITE, UROBILINOGEN, LEUKOCYTESUR, RBCUA, WBCUA, BACTERIA, SQUAMEPITHEL, HYALINECASTS in the last 168 hours.    Invalid input(s): WRIGHTSUR    Significant Imaging:  All pertinent imaging results/findings from the past 24 hours have been reviewed.                  Assessment/Plan:     * Bilateral ureteral obstruction  S/p urinary diversion with colon conduit 9/11   S/p emergent ex-lap 9/17, colo-colic anastomosis intact, bowel perforation found, underwent resection of right colon, remaining transverses colon, and proximal descending colon, ileostomy creation, Charlotte's pouch created     - CRS primary  - regular diet per Primary  - nephrostomy tubes removed 10/1 - Cr stable, good UOP from ostomy  - continue PT/OT, OOBTC as tolerated, encourage ambulation  - Wound care ostomy consulted for assistance with ostomy teaching   - patient is a [employer]; management per primary and Heme-Onc (on EPO, B12, and iron)  - agree with ID recommendations: PICC line DC'd 9/28,  cefepime for Enterobacter and flagyl for anaerobes  - ophthalmology consulted for candida retinitis - now on fluconazole     - IR placed new drain yesterday. Gram stain negative. Follow up IR drain cultures.     - per ID recs, repeat CT in 2 weeks for re-evaluation of fluid collections and drain placement. Repeat sooner if patient develops clinical change. Continue cefepime and flagyl until repeat CT, continue fluconazole 400mg daily for minimum of 4 weeks for retinal lesion consistent w/ candida. Patient will need serial ophtho exams to help determine final duration of therapy.  - follow up in ID clinic in 2 weeks     - Drains: maintain IR drains   - Prophylaxis: IS, SCDs, GI ppx        VTE Risk Mitigation (From admission, onward)         Ordered     IP VTE HIGH RISK PATIENT  Once      09/11/20 2024     Place sequential compression device  Until discontinued      09/11/20 2024                Izzy Vega MD  Urology  [facility]

## 2020-10-06 NOTE — PLAN OF CARE
10/06/20 1051   Post-Acute Status   Post-Acute Authorization Placement   Discharge Delays (!) Other  (Change in post-acute placement)     Patient post-acute recommendations changed from SNF/Rehab to LTAC placement. Patient was accepted to [facility], however PMR consult recommended the pt is appropriate for LTAC placement upon discharge. CM team will send referrals and seek placement for LTAC placement.    Kira Dawkins LMSW  Case Management   [facility]   Ext. [phone]

## 2020-10-06 NOTE — PROGRESS NOTES
[facility]  Colorectal Surgery  Progress Note    Patient Name: Edita Riley  MRN: [id number]  Admission Date: 9/11/2020  Hospital Length of Stay: 25 days  Attending Physician: Hammad Reynolds MD    Subjective:     Interval History: Febrile and tachycardic overnight. Had IR drains placed in abscess yesterday. Ostomy and urostomy working well. Wound vac to midline incision.    Post-Op Info:  Procedure(s) (LRB):  Nephrostogram - antegrade (Bilateral)  REMOVAL, STENT, URETER (Bilateral)   8 Days Post-Op      Medications:  Continuous Infusions:   electrolyte-A 1,000 mL (10/05/20 1746)     Scheduled Meds:   ceFEPime (MAXIPIME) IVPB  1 g Intravenous Q8H    cyanocobalamin  100 mcg Intramuscular Weekly    epoetin yecenia-epbx  20,000 Units Subcutaneous Q48H    fluconazole  400 mg Oral Daily    loperamide  2 mg Oral BID    metoprolol tartrate  25 mg Oral BID    metroNIDAZOLE  500 mg Oral Q8H    pantoprazole  40 mg Intravenous Daily    psyllium husk (aspartame)  1 packet Oral Daily    sodium chloride 0.9%  10 mL Intravenous Q6H     PRN Meds:   acetaminophen    acetaminophen    dextrose 50%    glucagon (human recombinant)    HYDROmorphone    insulin aspart U-100    iohexol    iohexol    labetalol    ondansetron    oxyCODONE-acetaminophen    oxyCODONE-acetaminophen    sodium chloride 0.9%    sodium chloride 0.9%    sodium chloride 0.9%        Objective:     Vital Signs (Most Recent):  Temp: 97.3 °F (36.3 °C) (10/06/20 0734)  Pulse: (!) 112 (10/06/20 0734)  Resp: 18 (10/06/20 0734)  BP: 135/62 (10/06/20 0734)  SpO2: (!) 94 % (10/06/20 0734) Vital Signs (24h Range):  Temp:  [97.3 °F (36.3 °C)-101.7 °F (38.7 °C)] 97.3 °F (36.3 °C)  Pulse:  [] 112  Resp:  [12-26] 18  SpO2:  [92 %-100 %] 94 %  BP: (130-175)/(59-83) 135/62     Intake/Output - Last 3 Shifts       10/04 0700 - 10/05 0659 10/05 0700 - 10/06 0659 10/06 0700 - 10/07 0659    Urine (mL/kg/hr) 600 (0.3) 850 (0.4)      Drains  70     Other 50      Stool 280 550     Total Output 930 1470     Net -930 -1470                  Physical Exam  Vitals signs and nursing note reviewed.   Constitutional:       Appearance: She is well-developed. She is not ill-appearing.   HENT:      Head: Normocephalic.   Eyes:      Pupils: Pupils are equal, round, and reactive to light.   Cardiovascular:      Rate and Rhythm: Normal rate and regular rhythm.      Heart sounds: Normal heart sounds.   Pulmonary:      Effort: Pulmonary effort is normal. No respiratory distress.      Breath sounds: Normal breath sounds. No wheezing or rales.   Abdominal:      Palpations: Abdomen is soft. There is no mass.      Tenderness: There is no guarding or rebound.      Comments: Ileostomy in place to RLQ with gas and stool within ostomy bag.  Urostomy bag in place to LLQ with clear, yellow urine  Midline wound vac remains in place with excellent seal  RLQ IR drain x3 in place   Musculoskeletal: Normal range of motion.   Skin:     General: Skin is warm and dry.   Neurological:      Mental Status: She is alert and oriented to person, place, and time.   Psychiatric:         Behavior: Behavior normal.         Thought Content: Thought content normal.         Judgment: Judgment normal.       Significant Labs:  CBC (Last 3 Results):   Recent Labs   Lab 10/04/20  0511 10/05/20  0306 10/06/20  0428   WBC 10.65 10.67 13.79*   RBC 2.60* 2.67* 2.83*   HGB 7.2* 6.9* 7.6*   HCT 24.3* 25.4* 26.1*   * 442* 422*   MCV 94 95 92   MCH 27.7 25.8* 26.9*   MCHC 29.6* 27.2* 29.1*     CMP (Last 3 Results):   Recent Labs   Lab 10/04/20  0511 10/05/20  0306 10/06/20  0428    93 103   CALCIUM 8.4* 8.4* 8.4*   ALBUMIN 1.8* 1.9* 2.0*   PROT 6.4 6.7 7.0    141 143   K 3.4* 3.3* 3.4*   CO2 27 25 24    104 105   BUN 12 11 8   CREATININE 0.9 0.9 0.9   ALKPHOS 137* 118 115   ALT 8* 7* 8*   AST 10 12 12   BILITOT 0.3 0.3 0.4         Assessment/Plan:     * Bilateral ureteral  obstruction  Ms. Riley is a 55 y.o. female with b/l ureteral obstruction s/p transverse colon conduit on 9/11/2020. Extended R colectomy and ileostomy creation on 9/17.     - DM diet  - Restart lovenox  - continue IV antibiotics per ID's recs  -  from 85.4; continue to trend  - mIVFs electrolyte-A @100mL/hr, per nephro recs; patient to follow-up with nephro in 4 wks  - ileostomy and urostomy in place with appropriate output  - continue to monitor I/Os  -  nephrosotrogram 9/28   - OOB, activity as tolerated  - PT/OT  - Discharge planning with         Fungemia  Blood cult poss for yeast    -consult opth, appreciate input  -d/c PICC  -blood culture after PICC removed, NGTF  -ITA per ID, cefepine, flgayl and diflucan  -monitor culture results   -line holiday    Wound infection after surgery  Wound now open, local wound care, wound vac  ITA per ID    Hypokalemia  Patient has hypokalemia which is currently uncontrolled. Last electrolytes reviewed-   Recent Labs   Lab 09/29/20  0355 09/30/20  0424   K 5.3* 4.4   . Will replace potassium and monitor electrolytes closely.     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  At time of that procedure, I procured a vascularized segment of transverse colon to be used as the urinary conduit, as requested by Urology.  A primary end-to-end, hand-sewn colocolonic anastomosis was performed at that time to restore intestinal continuity once the segment of colon to be used for the conduit had been excluded.  The patient did well for the 1st few days postoperatively but then began to develop abdominal pain and distention consistent with an ileus.  This morning, there was small amount  of pneumoperitoneum seen on a plain abdominal film, and later today she began to drain feculent appearing fluid through her Kurtis-Arias drain.  She had a normal white blood cell count and did not have peritonitis on examination, so a CT scan was performed, which demonstrated a large volume of free abdominal fluid as well as a large volume of pneumoperitoneum, concerning for an anastomotic leak.  She is being brought back to the operating room for urgent exploratory laparotomy.    OR 9/17/2020    Consult wound care for ileosotmy  Naseem vaughan, scd  Enc amb and IS  casandra smal amt of diet  Cont ITA per ID recommendations    Moderate malnutrition  Consult dietary  TPN dced  Monitor labs  Appreciate dietary inp;ut    ODESSA (acute kidney injury)  Monitor I&O  Appreciate neph input  Change IVF to d5w with 3 amps of NaBicarb due to renal acidosis  Renal dose meds    Severe episode of recurrent major depressive disorder, with psychotic features  Cont home m eds    Impaired functional mobility, balance, gait, and endurance  PT/OT    Essential hypertension  Chronic, controlled.  Latest blood pressure and vitals reviewed-   Temp:  [98 °F (36.7 °C)-99.8 °F (37.7 °C)]   Pulse:  []   Resp:  [18-25]   BP: (112-126)/(58-76)   SpO2:  [97 %-100 %] .   Home meds for hypertension were reviewed and noted below. Hospital anti-hypertensive changes were made as shown below.  Hospital Medications             labetalol 20 mg/4 mL (5 mg/mL) IV syring 10 mg, Intravenous, Every 4 hours PRN, 1. Confirm patient on continuous cardiac monitor (obtain order if needed)<BR>2. Obstetrics is exempt from continuous cardiac monitoring. <BR>3. Monitor ECG rhythm throughout administrations noting rhythm and changes<BR>4. Monitor BP and HR pre-administration, post-administration, and every 30 minutes x1 after dose given<BR>5. Administer Labetalol at rate no faster than 10mg over 1 minute.<BR>6. Contraindications: HR &lt;50, SBP&lt;100, second or third degree  AV block. If assessed, hold dose and call provider.        Will utilize p.r.n. blood pressure medication only if patient's blood pressure greater than  180/110 and she develops symptoms such as worsening chest pain or shortness of breath.          Tho Hernandez MD  Colorectal Surgery  [facility]

## 2020-10-06 NOTE — PT/OT/SLP PROGRESS
Occupational Therapy   Treatment    Name: Edita Riley  MRN: [id number]  Admitting Diagnosis:  Bilateral ureteral obstruction  8 Days Post-Op    Recommendations:     Discharge Recommendations: rehabilitation facility  Discharge Equipment Recommendations:  bath bench  Barriers to discharge:  None    Assessment:     Edita Riley is a 55 y.o. female with a medical diagnosis of Bilateral ureteral obstruction.  She presents with impairments listed below. Pt did well to tolerate and participate in the session. Pt did display decreased endurance for oob activities from previous session w/ writing therapist. Pt is progressing towards goals, but still functioning below baseline at this time.  Pt displayed global deconditioning requiring increased assist for ADLs and mobility at this time. Pt would benefit from skilled OT services to improve independence and overall occupational functioning.     Performance deficits affecting function are impaired endurance, weakness, impaired self care skills, impaired functional mobilty, decreased lower extremity function, impaired cardiopulmonary response to activity.     Rehab Prognosis:  Good; patient would benefit from acute skilled OT services to address these deficits and reach maximum level of function.       Plan:     Patient to be seen 3 x/week to address the above listed problems via self-care/home management, therapeutic activities, therapeutic exercises, neuromuscular re-education  · Plan of Care Expires: 10/08/20  · Plan of Care Reviewed with: patient    Subjective     Pain/Comfort:  Pain Rating 1: 0/10  Pain Rating Post-Intervention 1: 0/10    Objective:     Communicated with: RN prior to session.  Patient found up in chair with colostomy, ORESTES drain, peripheral IV, wound vac upon OT entry to room.    General Precautions: Standard, fall   Orthopedic Precautions:N/A   Braces: N/A     Occupational Performance:     Functional Mobility/Transfers:  · Patient  completed Sit <> Stand Transfer with stand by assistance  with  rolling walker   · Functional Mobility: Pt ambulated ~175 ft at sba w/ RW and 1 seated rest break.     Activities of Daily Living:  · Upper Body Dressing: minimum assistance adjusted gown  · Lower Body Dressing: maximal assistance donned and doffed socks seazted EOB      [facility] 6 Click ADL: 19    Treatment & Education:  Pt educated on POC.  Pt provided w/ psychosocial support throughout the session.   Sit<>Stand for 2 reps at sba w/ RW.     Patient left up in chair with all lines intact and call button in reachEducation:      GOALS:   Multidisciplinary Problems     Occupational Therapy Goals        Problem: Occupational Therapy Goal    Goal Priority Disciplines Outcome Interventions   Occupational Therapy Goal     OT, PT/OT Ongoing, Progressing    Description: Goals to be met by: 10/8/2020    Patient will increase functional independence with ADLs by performing:    LE Dressing with Supervision.  Grooming while standing with Supervision.   Toileting from toilet with Supervision for hygiene and clothing management.   Supine to sit with Supervision.  Toilet transfer to toilet with Supervision.                     Time Tracking:     OT Date of Treatment: 10/06/20  OT Start Time: 1141  OT Stop Time: 1204  OT Total Time (min): 23 min    Billable Minutes:Self Care/Home Management 8 minutes  Therapeutic Exercise 15 minutes    Oziel Raya, JAISON  10/6/2020

## 2020-10-06 NOTE — ASSESSMENT & PLAN NOTE
Ms. Riley is a 55 y.o. female with b/l ureteral obstruction s/p transverse colon conduit on 9/11/2020. Extended R colectomy and ileostomy creation on 9/17.     - DM diet  - Restart lovenox  - continue IV antibiotics per ID's recs  -  from 85.4; continue to trend  - mIVFs electrolyte-A @100mL/hr, per nephro recs; patient to follow-up with nephro in 4 wks  - ileostomy and urostomy in place with appropriate output  - continue to monitor I/Os  -  nephrosotrogram 9/28   - OOB, activity as tolerated  - PT/OT  - Discharge planning with SW

## 2020-10-07 ENCOUNTER — TELEPHONE (OUTPATIENT)
Dept: INTERNAL MEDICINE | Facility: CLINIC | Age: 55
End: 2020-10-07

## 2020-10-07 ENCOUNTER — TELEPHONE (OUTPATIENT)
Dept: UROLOGY | Facility: CLINIC | Age: 55
End: 2020-10-07

## 2020-10-07 LAB
ALBUMIN SERPL BCP-MCNC: 1.8 G/DL (ref 3.5–5.2)
ALP SERPL-CCNC: 95 U/L (ref 55–135)
ALT SERPL W/O P-5'-P-CCNC: 5 U/L (ref 10–44)
ANION GAP SERPL CALC-SCNC: 12 MMOL/L (ref 8–16)
ANISOCYTOSIS BLD QL SMEAR: SLIGHT
AST SERPL-CCNC: 9 U/L (ref 10–40)
BASOPHILS # BLD AUTO: 0.02 K/UL (ref 0–0.2)
BASOPHILS NFR BLD: 0.2 % (ref 0–1.9)
BILIRUB SERPL-MCNC: 0.3 MG/DL (ref 0.1–1)
BUN SERPL-MCNC: 10 MG/DL (ref 6–20)
CALCIUM SERPL-MCNC: 8.5 MG/DL (ref 8.7–10.5)
CHLORIDE SERPL-SCNC: 107 MMOL/L (ref 95–110)
CO2 SERPL-SCNC: 25 MMOL/L (ref 23–29)
CREAT SERPL-MCNC: 0.9 MG/DL (ref 0.5–1.4)
CRP SERPL-MCNC: 181.9 MG/L (ref 0–8.2)
DIFFERENTIAL METHOD: ABNORMAL
EOSINOPHIL # BLD AUTO: 0.2 K/UL (ref 0–0.5)
EOSINOPHIL NFR BLD: 2 % (ref 0–8)
ERYTHROCYTE [DISTWIDTH] IN BLOOD BY AUTOMATED COUNT: 19.9 % (ref 11.5–14.5)
EST. GFR  (AFRICAN AMERICAN): >60 ML/MIN/1.73 M^2
EST. GFR  (NON AFRICAN AMERICAN): >60 ML/MIN/1.73 M^2
GLUCOSE SERPL-MCNC: 98 MG/DL (ref 70–110)
HCT VFR BLD AUTO: 24.3 % (ref 37–48.5)
HGB BLD-MCNC: 6.8 G/DL (ref 12–16)
HYPOCHROMIA BLD QL SMEAR: ABNORMAL
IMM GRANULOCYTES # BLD AUTO: 0.07 K/UL (ref 0–0.04)
IMM GRANULOCYTES NFR BLD AUTO: 0.6 % (ref 0–0.5)
LYMPHOCYTES # BLD AUTO: 1.1 K/UL (ref 1–4.8)
LYMPHOCYTES NFR BLD: 9.7 % (ref 18–48)
MAGNESIUM SERPL-MCNC: 1.6 MG/DL (ref 1.6–2.6)
MCH RBC QN AUTO: 26.9 PG (ref 27–31)
MCHC RBC AUTO-ENTMCNC: 28 G/DL (ref 32–36)
MCV RBC AUTO: 96 FL (ref 82–98)
MONOCYTES # BLD AUTO: 0.8 K/UL (ref 0.3–1)
MONOCYTES NFR BLD: 7.2 % (ref 4–15)
NEUTROPHILS # BLD AUTO: 9.3 K/UL (ref 1.8–7.7)
NEUTROPHILS NFR BLD: 80.3 % (ref 38–73)
NRBC BLD-RTO: 0 /100 WBC
OVALOCYTES BLD QL SMEAR: ABNORMAL
PHOSPHATE SERPL-MCNC: 2.4 MG/DL (ref 2.7–4.5)
PLATELET # BLD AUTO: 301 K/UL (ref 150–350)
PLATELET BLD QL SMEAR: ABNORMAL
PMV BLD AUTO: 9.7 FL (ref 9.2–12.9)
POCT GLUCOSE: 105 MG/DL (ref 70–110)
POCT GLUCOSE: 110 MG/DL (ref 70–110)
POCT GLUCOSE: 117 MG/DL (ref 70–110)
POCT GLUCOSE: 98 MG/DL (ref 70–110)
POIKILOCYTOSIS BLD QL SMEAR: SLIGHT
POLYCHROMASIA BLD QL SMEAR: ABNORMAL
POTASSIUM SERPL-SCNC: 3.4 MMOL/L (ref 3.5–5.1)
PROT SERPL-MCNC: 6.5 G/DL (ref 6–8.4)
RBC # BLD AUTO: 2.53 M/UL (ref 4–5.4)
SODIUM SERPL-SCNC: 144 MMOL/L (ref 136–145)
WBC # BLD AUTO: 11.59 K/UL (ref 3.9–12.7)

## 2020-10-07 PROCEDURE — 85025 COMPLETE CBC W/AUTO DIFF WBC: CPT

## 2020-10-07 PROCEDURE — 25000003 PHARM REV CODE 250: Performed by: STUDENT IN AN ORGANIZED HEALTH CARE EDUCATION/TRAINING PROGRAM

## 2020-10-07 PROCEDURE — 25000242 PHARM REV CODE 250 ALT 637 W/ HCPCS: Performed by: NURSE PRACTITIONER

## 2020-10-07 PROCEDURE — 63600175 PHARM REV CODE 636 W HCPCS: Performed by: STUDENT IN AN ORGANIZED HEALTH CARE EDUCATION/TRAINING PROGRAM

## 2020-10-07 PROCEDURE — 84100 ASSAY OF PHOSPHORUS: CPT

## 2020-10-07 PROCEDURE — 86140 C-REACTIVE PROTEIN: CPT

## 2020-10-07 PROCEDURE — 11000001 HC ACUTE MED/SURG PRIVATE ROOM

## 2020-10-07 PROCEDURE — A4216 STERILE WATER/SALINE, 10 ML: HCPCS | Performed by: STUDENT IN AN ORGANIZED HEALTH CARE EDUCATION/TRAINING PROGRAM

## 2020-10-07 PROCEDURE — 80053 COMPREHEN METABOLIC PANEL: CPT

## 2020-10-07 PROCEDURE — 25000003 PHARM REV CODE 250: Performed by: COLON & RECTAL SURGERY

## 2020-10-07 PROCEDURE — 63600175 PHARM REV CODE 636 W HCPCS: Performed by: NURSE PRACTITIONER

## 2020-10-07 PROCEDURE — 25000003 PHARM REV CODE 250: Performed by: INTERNAL MEDICINE

## 2020-10-07 PROCEDURE — C9113 INJ PANTOPRAZOLE SODIUM, VIA: HCPCS | Performed by: STUDENT IN AN ORGANIZED HEALTH CARE EDUCATION/TRAINING PROGRAM

## 2020-10-07 PROCEDURE — 25000003 PHARM REV CODE 250: Performed by: NURSE PRACTITIONER

## 2020-10-07 PROCEDURE — 36415 COLL VENOUS BLD VENIPUNCTURE: CPT

## 2020-10-07 PROCEDURE — 83735 ASSAY OF MAGNESIUM: CPT

## 2020-10-07 RX ADMIN — CEFEPIME 1 G: 1 INJECTION, POWDER, FOR SOLUTION INTRAMUSCULAR; INTRAVENOUS at 11:10

## 2020-10-07 RX ADMIN — METOPROLOL TARTRATE 25 MG: 25 TABLET, FILM COATED ORAL at 09:10

## 2020-10-07 RX ADMIN — ONDANSETRON 4 MG: 2 INJECTION INTRAMUSCULAR; INTRAVENOUS at 05:10

## 2020-10-07 RX ADMIN — Medication 10 ML: at 12:10

## 2020-10-07 RX ADMIN — CEFEPIME 1 G: 1 INJECTION, POWDER, FOR SOLUTION INTRAMUSCULAR; INTRAVENOUS at 04:10

## 2020-10-07 RX ADMIN — SODIUM CHLORIDE, SODIUM GLUCONATE, SODIUM ACETATE, POTASSIUM CHLORIDE AND MAGNESIUM CHLORIDE 1000 ML: 526; 502; 368; 37; 30 INJECTION, SOLUTION INTRAVENOUS at 11:10

## 2020-10-07 RX ADMIN — METRONIDAZOLE 500 MG: 500 TABLET ORAL at 04:10

## 2020-10-07 RX ADMIN — PSYLLIUM HUSK 1 PACKET: 3.4 POWDER ORAL at 09:10

## 2020-10-07 RX ADMIN — FLUCONAZOLE 400 MG: 200 TABLET ORAL at 09:10

## 2020-10-07 RX ADMIN — Medication 10 ML: at 05:10

## 2020-10-07 RX ADMIN — Medication 10 ML: at 11:10

## 2020-10-07 RX ADMIN — Medication 10 ML: at 06:10

## 2020-10-07 RX ADMIN — SODIUM CHLORIDE, SODIUM GLUCONATE, SODIUM ACETATE, POTASSIUM CHLORIDE AND MAGNESIUM CHLORIDE 1000 ML: 526; 502; 368; 37; 30 INJECTION, SOLUTION INTRAVENOUS at 08:10

## 2020-10-07 RX ADMIN — METRONIDAZOLE 500 MG: 500 TABLET ORAL at 05:10

## 2020-10-07 RX ADMIN — EPOETIN ALFA-EPBX 20000 UNITS: 10000 INJECTION, SOLUTION INTRAVENOUS; SUBCUTANEOUS at 05:10

## 2020-10-07 RX ADMIN — ENOXAPARIN SODIUM 40 MG: 100 INJECTION SUBCUTANEOUS at 04:10

## 2020-10-07 RX ADMIN — LOPERAMIDE HYDROCHLORIDE 2 MG: 2 CAPSULE ORAL at 11:10

## 2020-10-07 RX ADMIN — METOPROLOL TARTRATE 25 MG: 25 TABLET, FILM COATED ORAL at 11:10

## 2020-10-07 RX ADMIN — PANTOPRAZOLE SODIUM 40 MG: 40 INJECTION, POWDER, LYOPHILIZED, FOR SOLUTION INTRAVENOUS at 09:10

## 2020-10-07 RX ADMIN — LOPERAMIDE HYDROCHLORIDE 2 MG: 2 CAPSULE ORAL at 09:10

## 2020-10-07 RX ADMIN — CEFEPIME 1 G: 1 INJECTION, POWDER, FOR SOLUTION INTRAMUSCULAR; INTRAVENOUS at 09:10

## 2020-10-07 RX ADMIN — METRONIDAZOLE 500 MG: 500 TABLET ORAL at 11:10

## 2020-10-07 RX ADMIN — ONDANSETRON 4 MG: 2 INJECTION INTRAMUSCULAR; INTRAVENOUS at 11:10

## 2020-10-07 NOTE — SUBJECTIVE & OBJECTIVE
Interval History:   Fever curve trending down. Patient feels well this morning. Continues to work with PT/OT. Tolerating diet.   IR drains continue to put out   Followed by ophtho who do not recommend continued abx, no invasive treatment at this time       Objective:     Temp:  [97.3 °F (36.3 °C)-100.5 °F (38.1 °C)] 100.5 °F (38.1 °C)  Pulse:  [] 112  Resp:  [16-18] 18  SpO2:  [92 %-97 %] 92 %  BP: (106-143)/(52-79) 143/74     Body mass index is 28.94 kg/m².           Drains     Drain                 Urostomy 09/11/20 ileal conduit LLQ 25 days         Ileostomy 09/18/20 RLQ 18 days         Closed/Suction Drain 10/05/20 1038 Right;Superior Abdomen Bulb 10 Fr. less than 1 day         Closed/Suction Drain 10/05/20 1039 Right;Inferior Bulb 10 Fr. less than 1 day                Physical Exam   Constitutional: No distress.   HENT:   Head: Normocephalic and atraumatic.   Eyes: Conjunctivae are normal. No scleral icterus.   Neck: Normal range of motion.   Cardiovascular: Normal rate.    Pulmonary/Chest: Effort normal. No respiratory distress.   Abdominal: Soft. She exhibits no distension. There is no abdominal tenderness. There is no rebound and no guarding.   Urostomy p/p/p draining clear yellow urine  Ileostomy output thickened   Midline incision with wound vac in place   Right superior drain with thin green output.   Right Inferior drain with thin green output.    Musculoskeletal: Normal range of motion.      Comments: SCDs in place   Neurological: She is alert.   Skin: Skin is warm and dry. She is not diaphoretic.         Significant Labs:    BMP:  Recent Labs   Lab 10/05/20  0306 10/06/20  0428 10/07/20  0420    143 144   K 3.3* 3.4* 3.4*    105 107   CO2 25 24 25   BUN 11 8 10   CREATININE 0.9 0.9 0.9   CALCIUM 8.4* 8.4* 8.5*       CBC:   Recent Labs   Lab 10/05/20  0306 10/06/20  0428 10/07/20  0420   WBC 10.67 13.79* 11.59   HGB 6.9* 7.6* 6.8*   HCT 25.4* 26.1* 24.3*   * 422* 301       Blood  Culture:   Recent Labs   Lab 10/06/20  0628 10/06/20  0631   LABBLOO No Growth to date No Growth to date     Urine Culture: No results for input(s): LABURIN in the last 168 hours.  Urine Studies: No results for input(s): COLORU, APPEARANCEUA, PHUR, SPECGRAV, PROTEINUA, GLUCUA, KETONESU, BILIRUBINUA, OCCULTUA, NITRITE, UROBILINOGEN, LEUKOCYTESUR, RBCUA, WBCUA, BACTERIA, SQUAMEPITHEL, HYALINECASTS in the last 168 hours.    Invalid input(s): WRIGHTSUR    Significant Imaging:  All pertinent imaging results/findings from the past 24 hours have been reviewed.              Review of Systems

## 2020-10-07 NOTE — PROGRESS NOTES
[facility]  Urology  Progress Note    Patient Name: Edita Riley  MRN: [id number]  Admission Date: 9/11/2020  Hospital Length of Stay: 26 days  Code Status: Full Code   Attending Provider: Hammad Reynolds MD   Primary Care Physician: Audrey Mustafa MD    Subjective:     HPI:  Edita Riley is a 55 y.o. female with a history of cervical cancer s/p pelvic radiation. She has since developed bilateral ureteral strictures and bilateral hydronephrosis R>L. She had a MAG3 scan confirmed presence of bilateral obstruction. She is s/p open transverse colon conduit urinary diversion on 9/11/2020.     Interval History:   Fever curve trending down. Patient feels well this morning. Continues to work with PT/OT. Tolerating diet.   IR drains continue to put out   Followed by ophtho who do not recommend continued abx, no invasive treatment at this time       Objective:     Temp:  [97.3 °F (36.3 °C)-100.5 °F (38.1 °C)] 100.5 °F (38.1 °C)  Pulse:  [] 112  Resp:  [16-18] 18  SpO2:  [92 %-97 %] 92 %  BP: (106-143)/(52-79) 143/74     Body mass index is 28.94 kg/m².           Drains     Drain                 Urostomy 09/11/20 ileal conduit LLQ 25 days         Ileostomy 09/18/20 RLQ 18 days         Closed/Suction Drain 10/05/20 1038 Right;Superior Abdomen Bulb 10 Fr. less than 1 day         Closed/Suction Drain 10/05/20 1039 Right;Inferior Bulb 10 Fr. less than 1 day                Physical Exam   Constitutional: No distress.   HENT:   Head: Normocephalic and atraumatic.   Eyes: Conjunctivae are normal. No scleral icterus.   Neck: Normal range of motion.   Cardiovascular: Normal rate.    Pulmonary/Chest: Effort normal. No respiratory distress.   Abdominal: Soft. She exhibits no distension. There is no abdominal tenderness. There is no rebound and no guarding.   Urostomy p/p/p draining clear yellow urine  Ileostomy output thickened   Midline incision with wound vac in place   Right superior  drain with thin green output.   Right Inferior drain with thin green output.    Musculoskeletal: Normal range of motion.      Comments: SCDs in place   Neurological: She is alert.   Skin: Skin is warm and dry. She is not diaphoretic.         Significant Labs:    BMP:  Recent Labs   Lab 10/05/20  0306 10/06/20  0428 10/07/20  0420    143 144   K 3.3* 3.4* 3.4*    105 107   CO2 25 24 25   BUN 11 8 10   CREATININE 0.9 0.9 0.9   CALCIUM 8.4* 8.4* 8.5*       CBC:   Recent Labs   Lab 10/05/20  0306 10/06/20  0428 10/07/20  0420   WBC 10.67 13.79* 11.59   HGB 6.9* 7.6* 6.8*   HCT 25.4* 26.1* 24.3*   * 422* 301       Blood Culture:   Recent Labs   Lab 10/06/20  0628 10/06/20  0631   LABBLOO No Growth to date No Growth to date     Urine Culture: No results for input(s): LABURIN in the last 168 hours.  Urine Studies: No results for input(s): COLORU, APPEARANCEUA, PHUR, SPECGRAV, PROTEINUA, GLUCUA, KETONESU, BILIRUBINUA, OCCULTUA, NITRITE, UROBILINOGEN, LEUKOCYTESUR, RBCUA, WBCUA, BACTERIA, SQUAMEPITHEL, HYALINECASTS in the last 168 hours.    Invalid input(s): WRIGHTSUR    Significant Imaging:  All pertinent imaging results/findings from the past 24 hours have been reviewed.              Review of Systems          Assessment/Plan:     * Bilateral ureteral obstruction  S/p urinary diversion with colon conduit 9/11   S/p emergent ex-lap 9/17, colo-colic anastomosis intact, bowel perforation found, underwent resection of right colon, remaining transverses colon, and proximal descending colon, ileostomy creation, Charlotte's pouch created     - CRS primary  - regular diet per Primary  - nephrostomy tubes removed 10/1 - Cr stable, good UOP from ostomy  - continue PT/OT, OOBTC as tolerated, encourage ambulation  - Wound care ostomy consulted for assistance with ostomy teaching, wound care  - patient is a Orthodox; management per primary and Heme-Onc (on EPO, B12, and iron)  - agree with ID recommendations:  PICC line DC'd 9/28, cefepime for Enterobacter and flagyl for anaerobes  - ophthalmology consulted for candida retinitis - now on fluconazole, no invasive treatment recommended at this time    - IR placed new drain 10/5. Gram stain negative. Follow up IR drain cultures.      - Drains: maintain IR drains   - Prophylaxis: IS, SCDs, GI ppx        VTE Risk Mitigation (From admission, onward)         Ordered     enoxaparin injection 40 mg  Every 24 hours      10/06/20 1001     IP VTE HIGH RISK PATIENT  Once      09/11/20 2024     Place sequential compression device  Until discontinued      09/11/20 2024                Mana Wilks MD  Urology  [facility]

## 2020-10-07 NOTE — ASSESSMENT & PLAN NOTE
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 Gram stain negative. Follow up IR drain cultures.      - Drains: maintain IR drains   - Prophylaxis: IS, SCDs, GI ppx

## 2020-10-07 NOTE — PLAN OF CARE
Plan of care reviewed with patient; verbalized understanding.   Medications reviewed and administered as ordered.  Rounding for safety and patient care per policy.   Safety precautions maintained. Patient working appropriately with PT/OT. Call light within reach, bed wheels locked, bed in lowest position, side rails ^x2, safety maintained. NADN, Will continue monitor.    Problem: Adult Inpatient Plan of Care  Goal: Plan of Care Review  Flowsheets (Taken 10/7/2020 0628)  Plan of Care Reviewed With: patient     Problem: Fall Injury Risk  Goal: Absence of Fall and Fall-Related Injury  Intervention: Promote Injury-Free Environment  Flowsheets (Taken 10/7/2020 0628)  Safety Promotion/Fall Prevention:   assistive device/personal item within reach   commode/urinal/bedpan at bedside   side rails raised x 2   supervised activity   toileting scheduled   instructed to call staff for mobility  Environmental Safety Modification:   assistive device/personal items within reach   clutter free environment maintained   lighting adjusted

## 2020-10-07 NOTE — PROGRESS NOTES
[facility]  Colorectal Surgery  Progress Note    Patient Name: Edita Riley  MRN: [id number]  Admission Date: 9/11/2020  Hospital Length of Stay: 26 days  Attending Physician: Hammad Reynolds MD    Subjective:     Interval History: Patient seen and examined this am. Patient with temperature spike to 101 yesterday afternoon, vital signs stable. WBC downtrending. CRP trending up. Patient states she feels well, subjectively. No new complaints at this time.     Post-Op Info:  Procedure(s) (LRB):  Nephrostogram - antegrade (Bilateral)  REMOVAL, STENT, URETER (Bilateral)   9 Days Post-Op      Medications:  Continuous Infusions:   electrolyte-A 1,000 mL (10/07/20 0811)     Scheduled Meds:   ceFEPime (MAXIPIME) IVPB  1 g Intravenous Q8H    cyanocobalamin  100 mcg Intramuscular Weekly    enoxaparin  40 mg Subcutaneous Q24H    epoetin yecenia-epbx  20,000 Units Subcutaneous Q48H    fluconazole  400 mg Oral Daily    loperamide  2 mg Oral BID    metoprolol tartrate  25 mg Oral BID    metroNIDAZOLE  500 mg Oral Q8H    pantoprazole  40 mg Intravenous Daily    psyllium husk (aspartame)  1 packet Oral Daily    sodium chloride 0.9%  10 mL Intravenous Q6H     PRN Meds:   acetaminophen    acetaminophen    dextrose 50%    glucagon (human recombinant)    HYDROmorphone    insulin aspart U-100    iohexol    iohexol    labetalol    ondansetron    oxyCODONE-acetaminophen    oxyCODONE-acetaminophen    sodium chloride 0.9%    sodium chloride 0.9%    sodium chloride 0.9%        Objective:     Vital Signs (Most Recent):  Temp: (!) 100.5 °F (38.1 °C) (10/07/20 0533)  Pulse: (!) 112 (10/07/20 0533)  Resp: 18 (10/07/20 0533)  BP: (!) 143/74 (10/07/20 0533)  SpO2: (!) 92 % (10/07/20 0533) Vital Signs (24h Range):  Temp:  [98.6 °F (37 °C)-100.5 °F (38.1 °C)] 100.5 °F (38.1 °C)  Pulse:  [] 112  Resp:  [16-18] 18  SpO2:  [92 %-97 %] 92 %  BP: (106-143)/(52-79) 143/74     Intake/Output - Last 3  Shifts       10/05 0700 - 10/06 0659 10/06 0700 - 10/07 0659 10/07 0700 - 10/08 0659    Urine (mL/kg/hr) 850 (0.4) 1200 (0.6)     Drains 70 160     Other       Stool 550 675     Total Output 1470 2035     Net -1470 -2035                  Physical Exam  Vitals signs and nursing note reviewed.   Constitutional:       Appearance: She is well-developed. She is not ill-appearing.   HENT:      Head: Normocephalic.   Eyes:      Pupils: Pupils are equal, round, and reactive to light.   Cardiovascular:      Rate and Rhythm: Normal rate and regular rhythm.      Heart sounds: Normal heart sounds.   Pulmonary:      Effort: Pulmonary effort is normal. No respiratory distress.      Breath sounds: Normal breath sounds. No wheezing or rales.   Abdominal:      Palpations: Abdomen is soft. There is no mass.      Tenderness: There is no guarding or rebound.      Comments: Ileostomy in place to RLQ with gas and stool within ostomy bag.  Urostomy bag in place to LLQ with clear, yellow urine  Midline wound vac remains in place with excellent seal  RLQ and Pelvic IR drains in place with serous output   Musculoskeletal: Normal range of motion.   Skin:     General: Skin is warm and dry.   Neurological:      Mental Status: She is alert and oriented to person, place, and time.   Psychiatric:         Behavior: Behavior normal.         Thought Content: Thought content normal.         Judgment: Judgment normal.             Significant Labs:  CBC (Last 3 Results):   Recent Labs   Lab 10/05/20  0306 10/06/20  0428 10/07/20  0420   WBC 10.67 13.79* 11.59   RBC 2.67* 2.83* 2.53*   HGB 6.9* 7.6* 6.8*   HCT 25.4* 26.1* 24.3*   * 422* 301   MCV 95 92 96   MCH 25.8* 26.9* 26.9*   MCHC 27.2* 29.1* 28.0*     CMP (Last 3 Results):   Recent Labs   Lab 10/05/20  0306 10/06/20  0428 10/07/20  0420   GLU 93 103 98   CALCIUM 8.4* 8.4* 8.5*   ALBUMIN 1.9* 2.0* 1.8*   PROT 6.7 7.0 6.5    143 144   K 3.3* 3.4* 3.4*   CO2 25 24 25    105 107    BUN 11 8 10   CREATININE 0.9 0.9 0.9   ALKPHOS 118 115 95   ALT 7* 8* 5*   AST 12 12 9*   BILITOT 0.3 0.4 0.3     CRP (Last 3 Results):   Recent Labs   Lab 10/05/20  0306 10/06/20  0428 10/07/20  0420   CRP 85.4* 176.7* 181.9*       Significant Diagnostics:  I have reviewed all pertinent imaging results/findings within the past 24 hours.    Assessment/Plan:     * Bilateral ureteral obstruction  Ms. Riley is a 55 y.o. female with b/l ureteral obstruction s/p transverse colon conduit on 9/11/2020. Extended R colectomy and ileostomy creation on 9/17.     - DM diet  - patient continues to spike temps to 101; WBC downtrending; 11.5 from 13.7 today  - continue IV antibiotics per ID's recs  - CRP from 181 from 176; continue to trend  - mIVFs electrolyte-A @100mL/hr, per nephro recs; patient to follow-up with nephro in 4 wks  - ileostomy and urostomy in place with appropriate output  - continue to monitor I/Os  - lovenox for DVT ppx  -  nephrosotrogram 9/28   - OOB, activity as tolerated  - PT/OT  - Discharge planning with   - awaiting rehabilitation bed availability            Mark Martinez MD  Colorectal Surgery  [facility]

## 2020-10-07 NOTE — PLAN OF CARE
Patient has not been accepted to any LTAC facilities at this time. The following facilities are currently reviewing [facility], [facility], [facility], and [facility]. SW will continue to follow.    10:38 AM  SW sent additional referrals via  to the following facilities to expand pt's placement options due to no current accepting facilities:  [facility]     2:10 PM  EDWARDO spoke with staff w/ [facility] who reports patient has been accepted pending Insurance acceptance. [facility] reports submitting Authorization this morning. EDWARDO will continue to follow.       Kira Dawkins LMSW  Case Management   [facility]   Ext. [phone]

## 2020-10-07 NOTE — SUBJECTIVE & OBJECTIVE
Subjective:     Interval History: Patient seen and examined this am. Patient with temperature spike to 101 yesterday afternoon, vital signs stable. WBC downtrending. CRP trending up. Patient states she feels well, subjectively. No new complaints at this time.     Post-Op Info:  Procedure(s) (LRB):  Nephrostogram - antegrade (Bilateral)  REMOVAL, STENT, URETER (Bilateral)   9 Days Post-Op      Medications:  Continuous Infusions:   electrolyte-A 1,000 mL (10/07/20 0811)     Scheduled Meds:   ceFEPime (MAXIPIME) IVPB  1 g Intravenous Q8H    cyanocobalamin  100 mcg Intramuscular Weekly    enoxaparin  40 mg Subcutaneous Q24H    epoetin yecenia-epbx  20,000 Units Subcutaneous Q48H    fluconazole  400 mg Oral Daily    loperamide  2 mg Oral BID    metoprolol tartrate  25 mg Oral BID    metroNIDAZOLE  500 mg Oral Q8H    pantoprazole  40 mg Intravenous Daily    psyllium husk (aspartame)  1 packet Oral Daily    sodium chloride 0.9%  10 mL Intravenous Q6H     PRN Meds:   acetaminophen    acetaminophen    dextrose 50%    glucagon (human recombinant)    HYDROmorphone    insulin aspart U-100    iohexol    iohexol    labetalol    ondansetron    oxyCODONE-acetaminophen    oxyCODONE-acetaminophen    sodium chloride 0.9%    sodium chloride 0.9%    sodium chloride 0.9%        Objective:     Vital Signs (Most Recent):  Temp: (!) 100.5 °F (38.1 °C) (10/07/20 0533)  Pulse: (!) 112 (10/07/20 0533)  Resp: 18 (10/07/20 0533)  BP: (!) 143/74 (10/07/20 0533)  SpO2: (!) 92 % (10/07/20 0533) Vital Signs (24h Range):  Temp:  [98.6 °F (37 °C)-100.5 °F (38.1 °C)] 100.5 °F (38.1 °C)  Pulse:  [] 112  Resp:  [16-18] 18  SpO2:  [92 %-97 %] 92 %  BP: (106-143)/(52-79) 143/74     Intake/Output - Last 3 Shifts       10/05 0700 - 10/06 0659 10/06 0700 - 10/07 0659 10/07 0700 - 10/08 0659    Urine (mL/kg/hr) 850 (0.4) 1200 (0.6)     Drains 70 160     Other       Stool 550 675     Total Output 1470 2035     Net -1470 -2035                   Physical Exam  Vitals signs and nursing note reviewed.   Constitutional:       Appearance: She is well-developed. She is not ill-appearing.   HENT:      Head: Normocephalic.   Eyes:      Pupils: Pupils are equal, round, and reactive to light.   Cardiovascular:      Rate and Rhythm: Normal rate and regular rhythm.      Heart sounds: Normal heart sounds.   Pulmonary:      Effort: Pulmonary effort is normal. No respiratory distress.      Breath sounds: Normal breath sounds. No wheezing or rales.   Abdominal:      Palpations: Abdomen is soft. There is no mass.      Tenderness: There is no guarding or rebound.      Comments: Ileostomy in place to RLQ with gas and stool within ostomy bag.  Urostomy bag in place to LLQ with clear, yellow urine  Midline wound vac remains in place with excellent seal  RLQ and Pelvic IR drains in place with serous output   Musculoskeletal: Normal range of motion.   Skin:     General: Skin is warm and dry.   Neurological:      Mental Status: She is alert and oriented to person, place, and time.   Psychiatric:         Behavior: Behavior normal.         Thought Content: Thought content normal.         Judgment: Judgment normal.             Significant Labs:  CBC (Last 3 Results):   Recent Labs   Lab 10/05/20  0306 10/06/20  0428 10/07/20  0420   WBC 10.67 13.79* 11.59   RBC 2.67* 2.83* 2.53*   HGB 6.9* 7.6* 6.8*   HCT 25.4* 26.1* 24.3*   * 422* 301   MCV 95 92 96   MCH 25.8* 26.9* 26.9*   MCHC 27.2* 29.1* 28.0*     CMP (Last 3 Results):   Recent Labs   Lab 10/05/20  0306 10/06/20  0428 10/07/20  0420   GLU 93 103 98   CALCIUM 8.4* 8.4* 8.5*   ALBUMIN 1.9* 2.0* 1.8*   PROT 6.7 7.0 6.5    143 144   K 3.3* 3.4* 3.4*   CO2 25 24 25    105 107   BUN 11 8 10   CREATININE 0.9 0.9 0.9   ALKPHOS 118 115 95   ALT 7* 8* 5*   AST 12 12 9*   BILITOT 0.3 0.4 0.3     CRP (Last 3 Results):   Recent Labs   Lab 10/05/20  0306 10/06/20  0428 10/07/20  0420   CRP 85.4* 176.7* 181.9*        Significant Diagnostics:  I have reviewed all pertinent imaging results/findings within the past 24 hours.

## 2020-10-07 NOTE — ASSESSMENT & PLAN NOTE
Ms. Riley is a 55 y.o. female with b/l ureteral obstruction s/p transverse colon conduit on 9/11/2020. Extended R colectomy and ileostomy creation on 9/17.     - DM diet  - patient continues to spike temps to 101; WBC downtrending; 11.5 from 13.7 today  - continue IV antibiotics per ID's recs  - CRP from 181 from 176; continue to trend  - mIVFs electrolyte-A @100mL/hr, per nephro recs; patient to follow-up with nephro in 4 wks  - ileostomy and urostomy in place with appropriate output  - continue to monitor I/Os  - lovenox for DVT ppx  -  nephrosotrogram 9/28   - OOB, activity as tolerated  - PT/OT  - Discharge planning with SW

## 2020-10-07 NOTE — PLAN OF CARE
Pt sitting up in chair , alert and oriented x4, skin warm to touch, able to move slowly ,but progressing

## 2020-10-07 NOTE — TELEPHONE ENCOUNTER
Attempted to call pt to reschedule appt  Pt did not answer phone, someone else did  Pt is currently admitted in hospital

## 2020-10-08 LAB
ALBUMIN SERPL BCP-MCNC: 1.8 G/DL (ref 3.5–5.2)
ALP SERPL-CCNC: 84 U/L (ref 55–135)
ALT SERPL W/O P-5'-P-CCNC: 5 U/L (ref 10–44)
ANION GAP SERPL CALC-SCNC: 9 MMOL/L (ref 8–16)
AST SERPL-CCNC: 9 U/L (ref 10–40)
BACTERIA SPEC AEROBE CULT: NO GROWTH
BASOPHILS # BLD AUTO: 0.03 K/UL (ref 0–0.2)
BASOPHILS NFR BLD: 0.4 % (ref 0–1.9)
BILIRUB SERPL-MCNC: 0.2 MG/DL (ref 0.1–1)
BUN SERPL-MCNC: 11 MG/DL (ref 6–20)
CALCIUM SERPL-MCNC: 8.2 MG/DL (ref 8.7–10.5)
CHLORIDE SERPL-SCNC: 108 MMOL/L (ref 95–110)
CO2 SERPL-SCNC: 26 MMOL/L (ref 23–29)
CREAT SERPL-MCNC: 0.9 MG/DL (ref 0.5–1.4)
CRP SERPL-MCNC: 95.2 MG/L (ref 0–8.2)
DIFFERENTIAL METHOD: ABNORMAL
EOSINOPHIL # BLD AUTO: 0.3 K/UL (ref 0–0.5)
EOSINOPHIL NFR BLD: 3.2 % (ref 0–8)
ERYTHROCYTE [DISTWIDTH] IN BLOOD BY AUTOMATED COUNT: 19.2 % (ref 11.5–14.5)
EST. GFR  (AFRICAN AMERICAN): >60 ML/MIN/1.73 M^2
EST. GFR  (NON AFRICAN AMERICAN): >60 ML/MIN/1.73 M^2
GLUCOSE SERPL-MCNC: 101 MG/DL (ref 70–110)
HCT VFR BLD AUTO: 24.2 % (ref 37–48.5)
HGB BLD-MCNC: 6.6 G/DL (ref 12–16)
IMM GRANULOCYTES # BLD AUTO: 0.03 K/UL (ref 0–0.04)
IMM GRANULOCYTES NFR BLD AUTO: 0.4 % (ref 0–0.5)
LYMPHOCYTES # BLD AUTO: 0.8 K/UL (ref 1–4.8)
LYMPHOCYTES NFR BLD: 9.1 % (ref 18–48)
MAGNESIUM SERPL-MCNC: 1.6 MG/DL (ref 1.6–2.6)
MCH RBC QN AUTO: 26 PG (ref 27–31)
MCHC RBC AUTO-ENTMCNC: 27.3 G/DL (ref 32–36)
MCV RBC AUTO: 95 FL (ref 82–98)
MONOCYTES # BLD AUTO: 0.8 K/UL (ref 0.3–1)
MONOCYTES NFR BLD: 9.4 % (ref 4–15)
NEUTROPHILS # BLD AUTO: 6.5 K/UL (ref 1.8–7.7)
NEUTROPHILS NFR BLD: 77.5 % (ref 38–73)
NRBC BLD-RTO: 1 /100 WBC
PHOSPHATE SERPL-MCNC: 2.5 MG/DL (ref 2.7–4.5)
PLATELET # BLD AUTO: 309 K/UL (ref 150–350)
PMV BLD AUTO: 10 FL (ref 9.2–12.9)
POCT GLUCOSE: 131 MG/DL (ref 70–110)
POCT GLUCOSE: 95 MG/DL (ref 70–110)
POCT GLUCOSE: 98 MG/DL (ref 70–110)
POTASSIUM SERPL-SCNC: 2.9 MMOL/L (ref 3.5–5.1)
PROT SERPL-MCNC: 6.3 G/DL (ref 6–8.4)
RBC # BLD AUTO: 2.54 M/UL (ref 4–5.4)
SODIUM SERPL-SCNC: 143 MMOL/L (ref 136–145)
WBC # BLD AUTO: 8.32 K/UL (ref 3.9–12.7)

## 2020-10-08 PROCEDURE — 80053 COMPREHEN METABOLIC PANEL: CPT

## 2020-10-08 PROCEDURE — 36415 COLL VENOUS BLD VENIPUNCTURE: CPT

## 2020-10-08 PROCEDURE — 25000003 PHARM REV CODE 250: Performed by: INTERNAL MEDICINE

## 2020-10-08 PROCEDURE — 84100 ASSAY OF PHOSPHORUS: CPT

## 2020-10-08 PROCEDURE — 25000003 PHARM REV CODE 250: Performed by: STUDENT IN AN ORGANIZED HEALTH CARE EDUCATION/TRAINING PROGRAM

## 2020-10-08 PROCEDURE — C9113 INJ PANTOPRAZOLE SODIUM, VIA: HCPCS | Performed by: STUDENT IN AN ORGANIZED HEALTH CARE EDUCATION/TRAINING PROGRAM

## 2020-10-08 PROCEDURE — 97530 THERAPEUTIC ACTIVITIES: CPT

## 2020-10-08 PROCEDURE — 63600175 PHARM REV CODE 636 W HCPCS: Performed by: STUDENT IN AN ORGANIZED HEALTH CARE EDUCATION/TRAINING PROGRAM

## 2020-10-08 PROCEDURE — 11000001 HC ACUTE MED/SURG PRIVATE ROOM

## 2020-10-08 PROCEDURE — 83735 ASSAY OF MAGNESIUM: CPT

## 2020-10-08 PROCEDURE — 97535 SELF CARE MNGMENT TRAINING: CPT

## 2020-10-08 PROCEDURE — 25000003 PHARM REV CODE 250: Performed by: COLON & RECTAL SURGERY

## 2020-10-08 PROCEDURE — 97803 MED NUTRITION INDIV SUBSEQ: CPT

## 2020-10-08 PROCEDURE — 25000242 PHARM REV CODE 250 ALT 637 W/ HCPCS: Performed by: NURSE PRACTITIONER

## 2020-10-08 PROCEDURE — 63600175 PHARM REV CODE 636 W HCPCS: Performed by: NURSE PRACTITIONER

## 2020-10-08 PROCEDURE — 25000003 PHARM REV CODE 250: Performed by: NURSE PRACTITIONER

## 2020-10-08 PROCEDURE — A4216 STERILE WATER/SALINE, 10 ML: HCPCS | Performed by: STUDENT IN AN ORGANIZED HEALTH CARE EDUCATION/TRAINING PROGRAM

## 2020-10-08 PROCEDURE — 85025 COMPLETE CBC W/AUTO DIFF WBC: CPT

## 2020-10-08 PROCEDURE — 86140 C-REACTIVE PROTEIN: CPT

## 2020-10-08 RX ADMIN — Medication 10 ML: at 06:10

## 2020-10-08 RX ADMIN — SODIUM CHLORIDE, SODIUM GLUCONATE, SODIUM ACETATE, POTASSIUM CHLORIDE AND MAGNESIUM CHLORIDE 1000 ML: 526; 502; 368; 37; 30 INJECTION, SOLUTION INTRAVENOUS at 10:10

## 2020-10-08 RX ADMIN — CEFEPIME 1 G: 1 INJECTION, POWDER, FOR SOLUTION INTRAMUSCULAR; INTRAVENOUS at 05:10

## 2020-10-08 RX ADMIN — ENOXAPARIN SODIUM 40 MG: 100 INJECTION SUBCUTANEOUS at 04:10

## 2020-10-08 RX ADMIN — FLUCONAZOLE 400 MG: 200 TABLET ORAL at 09:10

## 2020-10-08 RX ADMIN — LOPERAMIDE HYDROCHLORIDE 2 MG: 2 CAPSULE ORAL at 09:10

## 2020-10-08 RX ADMIN — METRONIDAZOLE 500 MG: 500 TABLET ORAL at 09:10

## 2020-10-08 RX ADMIN — METRONIDAZOLE 500 MG: 500 TABLET ORAL at 06:10

## 2020-10-08 RX ADMIN — PANTOPRAZOLE SODIUM 40 MG: 40 INJECTION, POWDER, LYOPHILIZED, FOR SOLUTION INTRAVENOUS at 09:10

## 2020-10-08 RX ADMIN — CEFEPIME 1 G: 1 INJECTION, POWDER, FOR SOLUTION INTRAMUSCULAR; INTRAVENOUS at 09:10

## 2020-10-08 RX ADMIN — Medication 10 ML: at 05:10

## 2020-10-08 RX ADMIN — Medication 10 ML: at 02:10

## 2020-10-08 RX ADMIN — METOPROLOL TARTRATE 25 MG: 25 TABLET, FILM COATED ORAL at 09:10

## 2020-10-08 RX ADMIN — PSYLLIUM HUSK 1 PACKET: 3.4 POWDER ORAL at 09:10

## 2020-10-08 RX ADMIN — ONDANSETRON 4 MG: 2 INJECTION INTRAMUSCULAR; INTRAVENOUS at 02:10

## 2020-10-08 NOTE — SUBJECTIVE & OBJECTIVE
Interval History:   Patient feels well this morning. Afebrile over 24hrs.    Continues to work with PT/OT. Tolerating diet.   IR drains slowing down in output.   UOP good per urostomy.       Objective:     Temp:  [97 °F (36.1 °C)-99.7 °F (37.6 °C)] 98 °F (36.7 °C)  Pulse:  [] 93  Resp:  [17-26] 20  SpO2:  [94 %-98 %] 97 %  BP: (145-152)/(79-82) 148/80     Body mass index is 28.94 kg/m².           Drains     Drain                 Urostomy 09/11/20 ileal conduit LLQ 27 days         Ileostomy 09/18/20 RLQ 20 days         Closed/Suction Drain 10/05/20 1038 Right;Superior Abdomen Bulb 10 Fr. 2 days         Closed/Suction Drain 10/05/20 1039 Right;Inferior Bulb 10 Fr. 2 days                Physical Exam   Constitutional: No distress.   HENT:   Head: Normocephalic and atraumatic.   Eyes: Conjunctivae are normal. No scleral icterus.   Neck: Normal range of motion.   Cardiovascular: Normal rate.    Pulmonary/Chest: Effort normal. No respiratory distress.   Abdominal: Soft. She exhibits no distension. There is no abdominal tenderness. There is no rebound and no guarding.   Urostomy p/p/p draining clear yellow urine  Ileostomy output thickened   Midline incision with wound vac in place   Right superior drain with thin green output.   Right Inferior drain with thin green output.    Musculoskeletal: Normal range of motion.      Comments: SCDs in place   Neurological: She is alert.   Skin: Skin is warm and dry. She is not diaphoretic.         Significant Labs:    BMP:  Recent Labs   Lab 10/05/20  0306 10/06/20  0428 10/07/20  0420    143 144   K 3.3* 3.4* 3.4*    105 107   CO2 25 24 25   BUN 11 8 10   CREATININE 0.9 0.9 0.9   CALCIUM 8.4* 8.4* 8.5*       CBC:   Recent Labs   Lab 10/06/20  0428 10/07/20  0420 10/08/20  0513   WBC 13.79* 11.59 8.32   HGB 7.6* 6.8* 6.6*   HCT 26.1* 24.3* 24.2*   * 301 309       Blood Culture:   Recent Labs   Lab 10/06/20  0628 10/06/20  0631   LABBLOO No Growth to date  No  Growth to date No Growth to date  No Growth to date     Urine Culture: No results for input(s): LABURIN in the last 168 hours.  Urine Studies: No results for input(s): COLORU, APPEARANCEUA, PHUR, SPECGRAV, PROTEINUA, GLUCUA, KETONESU, BILIRUBINUA, OCCULTUA, NITRITE, UROBILINOGEN, LEUKOCYTESUR, RBCUA, WBCUA, BACTERIA, SQUAMEPITHEL, HYALINECASTS in the last 168 hours.    Invalid input(s): WRIGHTSUR    Significant Imaging:  All pertinent imaging results/findings from the past 24 hours have been reviewed.

## 2020-10-08 NOTE — PLAN OF CARE
Post-acute Medical reports insurance company denied Authorization for Rehab placement. EDWARDO spoke with Denise who reports pt's insurance company would only approve 3 days, however pt will need additional days for recovery. CM will continue to follow.     Patient is still seeking placement. EDWARDO faxed additional referrals via .     Kira Dawkins LMSW  Case Management   [facility]   Ext. [phone]

## 2020-10-08 NOTE — ASSESSMENT & PLAN NOTE
S/p urinary diversion with colon conduit 9/11   S/p emergent ex-lap 9/17, colo-colic anastomosis intact, bowel perforation found, underwent resection of right colon, remaining transverses colon, and proximal descending colon, ileostomy creation, Charlotte's pouch created     - CRS primary  - regular diet per Primary  - nephrostomy tubes removed 10/1 - Cr stable, good UOP from ostomy  - continue PT/OT, OOBTC as tolerated, encourage ambulation  - Wound care ostomy consulted for assistance with ostomy teaching, wound care  - patient is a [employer]; management per primary and Heme-Onc (on EPO, B12, and iron)  - IR placed new drain 10/5. Gram stain negative. Follow up IR drain cultures.   - Abx per ID and primary.    - Drains: maintain IR drains   - Prophylaxis: IS, SCDs, GI ppx

## 2020-10-08 NOTE — ASSESSMENT & PLAN NOTE
Ms. Riley is a 55 y.o. female with b/l ureteral obstruction s/p transverse colon conduit on 9/11/2020. Extended R colectomy and ileostomy creation on 9/17.     - patient tolerating diabetic diet well  - vital signs stable  - WBC normalized to 8.3 from 10  - continue IV antibiotics per ID's recs  - f/u cultures from recent IR drain placement  - 2 IR drains in place with serosanguinous output  - CRP 95.1 from 181; continue to trend  - mIVFs electrolyte-A @100mL/hr, per nephro recs; patient to follow-up with nephro in 4 wks  - ileostomy and urostomy in place with appropriate output  - wound care following for vac changes twice weekly  - continue to monitor I/Os  - lovenox for DVT ppx  - OOB, activity as tolerated  - PT/OT  - Discharge planning with SW

## 2020-10-08 NOTE — PROGRESS NOTES
[facility]  Urology  Progress Note    Patient Name: Edita Riley  MRN: [id number]  Admission Date: 9/11/2020  Hospital Length of Stay: 27 days  Code Status: Full Code   Attending Provider: Hammad Reynolds MD   Primary Care Physician: Audrey Mustafa MD    Subjective:     HPI:  Edita Riley is a 55 y.o. female with a history of cervical cancer s/p pelvic radiation. She has since developed bilateral ureteral strictures and bilateral hydronephrosis R>L. She had a MAG3 scan confirmed presence of bilateral obstruction. She is s/p open transverse colon conduit urinary diversion on 9/11/2020.     Interval History:   Patient feels well this morning. Afebrile over 24hrs.    Continues to work with PT/OT. Tolerating diet.   IR drains slowing down in output.   UOP good per urostomy.       Objective:     Temp:  [97 °F (36.1 °C)-99.7 °F (37.6 °C)] 98 °F (36.7 °C)  Pulse:  [] 93  Resp:  [17-26] 20  SpO2:  [94 %-98 %] 97 %  BP: (145-152)/(79-82) 148/80     Body mass index is 28.94 kg/m².           Drains     Drain                 Urostomy 09/11/20 ileal conduit LLQ 27 days         Ileostomy 09/18/20 RLQ 20 days         Closed/Suction Drain 10/05/20 1038 Right;Superior Abdomen Bulb 10 Fr. 2 days         Closed/Suction Drain 10/05/20 1039 Right;Inferior Bulb 10 Fr. 2 days                Physical Exam   Constitutional: No distress.   HENT:   Head: Normocephalic and atraumatic.   Eyes: Conjunctivae are normal. No scleral icterus.   Neck: Normal range of motion.   Cardiovascular: Normal rate.    Pulmonary/Chest: Effort normal. No respiratory distress.   Abdominal: Soft. She exhibits no distension. There is no abdominal tenderness. There is no rebound and no guarding.   Urostomy p/p/p draining clear yellow urine  Ileostomy output thickened   Midline incision with wound vac in place   Right superior drain with thin green output.   Right Inferior drain with thin green output.     Musculoskeletal: Normal range of motion.      Comments: SCDs in place   Neurological: She is alert.   Skin: Skin is warm and dry. She is not diaphoretic.         Significant Labs:    BMP:  Recent Labs   Lab 10/05/20  0306 10/06/20  0428 10/07/20  0420    143 144   K 3.3* 3.4* 3.4*    105 107   CO2 25 24 25   BUN 11 8 10   CREATININE 0.9 0.9 0.9   CALCIUM 8.4* 8.4* 8.5*       CBC:   Recent Labs   Lab 10/06/20  0428 10/07/20  0420 10/08/20  0513   WBC 13.79* 11.59 8.32   HGB 7.6* 6.8* 6.6*   HCT 26.1* 24.3* 24.2*   * 301 309       Blood Culture:   Recent Labs   Lab 10/06/20  0628 10/06/20  0631   LABBLOO No Growth to date  No Growth to date No Growth to date  No Growth to date     Urine Culture: No results for input(s): LABURIN in the last 168 hours.  Urine Studies: No results for input(s): COLORU, APPEARANCEUA, PHUR, SPECGRAV, PROTEINUA, GLUCUA, KETONESU, BILIRUBINUA, OCCULTUA, NITRITE, UROBILINOGEN, LEUKOCYTESUR, RBCUA, WBCUA, BACTERIA, SQUAMEPITHEL, HYALINECASTS in the last 168 hours.    Invalid input(s): WRIGHTSUR    Significant Imaging:  All pertinent imaging results/findings from the past 24 hours have been reviewed.                  Assessment/Plan:     * Bilateral ureteral obstruction  S/p urinary diversion with colon conduit 9/11   S/p emergent ex-lap 9/17, colo-colic anastomosis intact, bowel perforation found, underwent resection of right colon, remaining transverses colon, and proximal descending colon, ileostomy creation, Charlotte's pouch created     - CRS primary  - regular diet per Primary  - nephrostomy tubes removed 10/1 - Cr stable, good UOP from ostomy  - continue PT/OT, OOBTC as tolerated, encourage ambulation  - Wound care ostomy consulted for assistance with ostomy teaching, wound care  - patient is a [employer]; management per primary and Heme-Onc (on EPO, B12, and iron)  - IR placed new drain 10/5. Gram stain negative. Follow up IR drain cultures.   - Abx per ID  and primary.    - Drains: maintain IR drains   - Prophylaxis: IS, SCDs, GI ppx        VTE Risk Mitigation (From admission, onward)         Ordered     enoxaparin injection 40 mg  Every 24 hours      10/06/20 1001     IP VTE HIGH RISK PATIENT  Once      09/11/20 2024     Place sequential compression device  Until discontinued      09/11/20 2024                Izzy Vega MD  Urology  [facility]

## 2020-10-08 NOTE — PLAN OF CARE
Goals reviewed and remain appropriate.   Megha Pavon OTR/L  Pager #: [phone]  10/8/2020    Problem: Occupational Therapy Goal  Goal: Occupational Therapy Goal  Description: Goals to be met by: 10/18/2020    Patient will increase functional independence with ADLs by performing:    LE Dressing with Supervision.  Grooming while standing with Supervision.   Toileting from toilet with Supervision for hygiene and clothing management.   Supine to sit with Supervision.  Toilet transfer to toilet with Supervision.    Outcome: Ongoing, Progressing

## 2020-10-08 NOTE — PT/OT/SLP PROGRESS
"Occupational Therapy   Treatment    Name: Edita Riley  MRN: [id number]  Admitting Diagnosis:  Bilateral ureteral obstruction      10 Days Post-Op  Pre-op Diagnosis: Bilateral ureteral obstruction [N13.5]    Procedure(s):  Nephrostogram - antegrade  REMOVAL, STENT, URETER     Recommendations:     Discharge Recommendations: rehabilitation facility  Discharge Equipment Recommendations:  walker, rolling  Barriers to discharge:  None    Assessment:     Edita Riley is a 55 y.o. female with a medical diagnosis of Bilateral ureteral obstruction. She presents with the following performance deficits affecting function: impaired endurance, impaired self care skills, impaired functional mobilty, gait instability, impaired balance, impaired joint extensibility. Patient agreeable to therapy session and completed functional mobility and ADLs with SBA/CGA. At this time, patient will continue to benefit from acute skilled therapy intervention to address deficits/underlying impairments and progress towards prior level of function. After discharge, patient will benefit from continuing therapy at rehab facility to increase independence in ADLs and functional mobility through skilled balance training and skilled therapeutic exercise to promote safety at home.    Rehab Prognosis:  Good; patient would benefit from acute skilled OT services to address these deficits and reach maximum level of function.       Plan:     Patient to be seen 3 x/week to address the above listed problems via self-care/home management, therapeutic activities, therapeutic exercises  · Plan of Care Expires: 10/08/20  · Plan of Care Reviewed with: patient    Subjective     Patient stated "oh I feel so much better".     Pain/Comfort:  · Pain Rating 1: 0/10    Objective:     Communicated with: RN prior to session. Patient found HOB elevated with ORESTES drain, wound vac, peripheral IV upon OT entry to room.    General Precautions: Standard, fall "   Orthopedic Precautions:N/A   Braces: N/A     Occupational Performance:     Bed Mobility:    · Patient completed Supine to Sit with stand by assistance; HOB elevated     Functional Mobility/Transfers:  · Patient completed Sit <> Stand Transfer with stand by assistance with RW  · 2x from EOB  · Cues provided for hand placement   · Functional Mobility:  · Room: patient ambulated EOB <> bathroom with SBA with RW  · Hallway: patient ambulated ~200 ft in hallway with SBA to [facility] with RW  · Patient presented with slow gait requiring extended time  · Completed without rest break  · Mask donned before leaving room    Activities of Daily Living:  · Grooming: stand by assistance standing at sink with RW    [facility] 6 Click ADL: 19    Treatment & Education:   Therapist provided facilitation and instruction of proper body mechanics and fall prevention strategies during tasks listed above.   Instructed patient to sit in bedside chair daily to increase OOB/activity tolerance.   Instructed patient to use call light to have nursing staff assist with needs/transfers.   Discussed OT POC and answered all questions within OT scope of practice.   Whiteboard updated     Patient left up in chair with all lines intact and call button in reachEducation:      GOALS:   Multidisciplinary Problems     Occupational Therapy Goals        Problem: Occupational Therapy Goal    Goal Priority Disciplines Outcome Interventions   Occupational Therapy Goal     OT, PT/OT Ongoing, Progressing    Description: Goals to be met by: 10/18/2020    Patient will increase functional independence with ADLs by performing:    LE Dressing with Supervision.  Grooming while standing with Supervision.   Toileting from toilet with Supervision for hygiene and clothing management.   Supine to sit with Supervision.  Toilet transfer to toilet with Supervision.                     Time Tracking:     OT Date of Treatment: 10/08/20  OT Start Time: 0856  OT Stop Time: 0921  OT  Total Time (min): 25 min    Billable Minutes:Self Care/Home Management 10  Therapeutic Activity 15    Megha Pavon OT  10/8/2020

## 2020-10-08 NOTE — SUBJECTIVE & OBJECTIVE
Subjective:     Interval History: Patient seen and examined this morning. No acute events overnight. WBC normalized, CRP downtrending. Vitals stable. Patient endorses feeling well. Awaiting insurance authorization to LTAC.     Post-Op Info:  Procedure(s) (LRB):  Nephrostogram - antegrade (Bilateral)  REMOVAL, STENT, URETER (Bilateral)   10 Days Post-Op      Medications:  Continuous Infusions:   electrolyte-A 1,000 mL (10/07/20 2327)     Scheduled Meds:   ceFEPime (MAXIPIME) IVPB  1 g Intravenous Q8H    cyanocobalamin  100 mcg Intramuscular Weekly    enoxaparin  40 mg Subcutaneous Q24H    epoetin yecenia-epbx  20,000 Units Subcutaneous Q48H    fluconazole  400 mg Oral Daily    loperamide  2 mg Oral BID    metoprolol tartrate  25 mg Oral BID    metroNIDAZOLE  500 mg Oral Q8H    pantoprazole  40 mg Intravenous Daily    psyllium husk (aspartame)  1 packet Oral Daily    sodium chloride 0.9%  10 mL Intravenous Q6H     PRN Meds:   acetaminophen    acetaminophen    dextrose 50%    glucagon (human recombinant)    HYDROmorphone    insulin aspart U-100    iohexol    iohexol    labetalol    ondansetron    oxyCODONE-acetaminophen    oxyCODONE-acetaminophen    sodium chloride 0.9%    sodium chloride 0.9%    sodium chloride 0.9%        Objective:     Vital Signs (Most Recent):  Temp: 98 °F (36.7 °C) (10/08/20 0001)  Pulse: 93 (10/08/20 0336)  Resp: 20 (10/08/20 0336)  BP: (!) 148/80 (10/08/20 0336)  SpO2: 97 % (10/08/20 0336) Vital Signs (24h Range):  Temp:  [97 °F (36.1 °C)-99.7 °F (37.6 °C)] 98 °F (36.7 °C)  Pulse:  [] 93  Resp:  [17-26] 20  SpO2:  [94 %-98 %] 97 %  BP: (145-152)/(79-82) 148/80     Intake/Output - Last 3 Shifts       10/06 0700 - 10/07 0659 10/07 0700 - 10/08 0659 10/08 0700 - 10/09 0659    P.O.  1400     I.V. (mL/kg)  800 (9)     Total Intake(mL/kg)  2200 (24.7)     Urine (mL/kg/hr) 1200 (0.6) 1003 (0.5) 300 (3.7)    Emesis/NG output  100     Drains 160 59     Other  25     Stool  675 445     Total Output 2035 1632 300    Net -2035 +568 -300           Emesis Occurrence  1 x           Physical Exam  Vitals signs and nursing note reviewed.   Constitutional:       Appearance: She is well-developed. She is not ill-appearing.   HENT:      Head: Normocephalic.   Eyes:      Pupils: Pupils are equal, round, and reactive to light.   Cardiovascular:      Rate and Rhythm: Normal rate and regular rhythm.      Heart sounds: Normal heart sounds.   Pulmonary:      Effort: Pulmonary effort is normal. No respiratory distress.      Breath sounds: Normal breath sounds. No wheezing or rales.   Abdominal:      Palpations: Abdomen is soft. There is no mass.      Tenderness: There is no guarding or rebound.      Comments: Ileostomy in place to RLQ with gas and stool within ostomy bag.  Urostomy bag in place to LLQ with clear, yellow urine  Midline wound vac remains in place with excellent seal  RLQ and Pelvic IR drains in place with serous output   Musculoskeletal: Normal range of motion.   Skin:     General: Skin is warm and dry.   Neurological:      Mental Status: She is alert and oriented to person, place, and time.   Psychiatric:         Behavior: Behavior normal.         Thought Content: Thought content normal.         Judgment: Judgment normal.             Significant Labs:  CBC (Last 3 Results):   Recent Labs   Lab 10/06/20  0428 10/07/20  0420 10/08/20  0513   WBC 13.79* 11.59 8.32   RBC 2.83* 2.53* 2.54*   HGB 7.6* 6.8* 6.6*   HCT 26.1* 24.3* 24.2*   * 301 309   MCV 92 96 95   MCH 26.9* 26.9* 26.0*   MCHC 29.1* 28.0* 27.3*     CMP (Last 3 Results):   Recent Labs   Lab 10/06/20  0428 10/07/20  0420 10/08/20  0512    98 101   CALCIUM 8.4* 8.5* 8.2*   ALBUMIN 2.0* 1.8* 1.8*   PROT 7.0 6.5 6.3    144 143   K 3.4* 3.4* 2.9*   CO2 24 25 26    107 108   BUN 8 10 11   CREATININE 0.9 0.9 0.9   ALKPHOS 115 95 84   ALT 8* 5* 5*   AST 12 9* 9*   BILITOT 0.4 0.3 0.2     CRP (Last 3 Results):    Recent Labs   Lab 10/06/20  0428 10/07/20  0420 10/08/20  0513   .7* 181.9* 95.2*       Significant Diagnostics:  I have reviewed all pertinent imaging results/findings within the past 24 hours.

## 2020-10-08 NOTE — PROGRESS NOTES
"[facility]  Adult Nutrition  Progress Note    SUMMARY       Recommendations    Pt meets chronic moderate malnutrition criteria.      1. Continue current diet as tolerated.      2. Add Boost Glucose Control with meals to aid in caloric intake.      3. RD following.     Goals: meet >75% EEN/EPN by RD follow-up  Nutrition Goal Status: goal met  Communication of RD Recs: (POC)    Reason for Assessment    Reason For Assessment: RD follow-up  Diagnosis: (Bilateral ureteral obstruction )  Relevant Medical History: cervical cancer s/p pelvic radiation, Fibromyalgia, HTN  Interdisciplinary Rounds: did not attend  General Information Comments: Pt resting in bed with no family members at bedside. Pt reports that appetite has improved. Per chart review, pt eating % of meals. Pt would still like ONS, ordered. Denies N/V. NFPE completed . Pt with mild muscle/fat wasting. Pt meets chronic moderate malnutrition criteria.   Nutrition Discharge Planning: unable to determine at this time    Nutrition Risk Screen    Nutrition Risk Screen: no indicators present    Nutrition/Diet History    Spiritual, Cultural Beliefs, Restoration Practices, Values that Affect Care: no  Factors Affecting Nutritional Intake: altered gastrointestinal function, NPO    Anthropometrics    Temp: 96.4 °F (35.8 °C)  Height Method: Measured  Height: 5' 9" (175.3 cm)  Height (inches): 69 in  Weight Method: Bed Scale  Weight: 88.9 kg (196 lb)  Weight (lb): 196 lb  Ideal Body Weight (IBW), Female: 145 lb  % Ideal Body Weight, Female (lb): 135.17 %  BMI (Calculated): 28.9  BMI Grade: 25 - 29.9 - overweight  Weight Loss: unintentional  Usual Body Weight (UBW), k.5 kg(per chart review 3/22/20)  % Usual Body Weight: 82.88  % Weight Change From Usual Weight: -17.3 %     Lab/Procedures/Meds    Pertinent Labs Reviewed: reviewed  Pertinent Labs Comments: K 2.9, phos 2.5, AST 9, ALT 5, CRP 95.8  Pertinent Medications Reviewed: " reviewed  Pertinent Medications Comments: metoprolol, psyllium husk     Estimated/Assessed Needs    Weight Used For Calorie Calculations: 88.9 kg (195 lb 15.8 oz)  Energy Calorie Requirements (kcal): 1922 kcal/day  Energy Need Method: Shackelford-St Jeor(x 1.25)  Protein Requirements:  gm/day(1.0-1.2 gm/kg)  Weight Used For Protein Calculations: 88.9 kg (195 lb 15.8 oz)  Fluid Requirements (mL): 1 mL/kcal or per MD     RDA Method (mL): 1922     Nutrition Prescription Ordered    Current Diet Order: Diabetic     Evaluation of Received Nutrient/Fluid Intake    I/O: -14.366L since 9/24  Comments: LBM 10/7(ostomy)  Tolerance: tolerating  % Intake of Estimated Energy Needs: 75 - 100 %  % Meal Intake: 75 - 100 %    Nutrition Risk    Level of Risk/Frequency of Follow-up: (f/u 1 x wk)     Assessment and Plan    Moderate malnutrition  Nutrition Problem:  Moderate Protein-Calorie Malnutrition  Malnutrition in the context of Chronic Illness/Injury    Related to (etiology):  Inadequate Energy Intake; Decreased appetite with N/V    Signs and Symptoms (as evidenced by):  Energy Intake: <75% of estimated energy requirement for > 3 months  Body Fat Depletion: well-nourished   Muscle Mass Depletion: mild depletion of temples and clavicle region   Weight Loss: 17% x 6 months      Interventions (treatment strategy):  Collaboration of nutrition care with other providers    Nutrition Diagnosis Status:  Continues              Monitor and Evaluation    Food and Nutrient Intake: energy intake, food and beverage intake  Food and Nutrient Adminstration: diet order  Physical Activity and Function: nutrition-related ADLs and IADLs  Anthropometric Measurements: weight, weight change, body mass index  Biochemical Data, Medical Tests and Procedures: electrolyte and renal panel, gastrointestinal profile, glucose/endocrine profile, inflammatory profile, lipid profile  Nutrition-Focused Physical Findings: overall appearance     Malnutrition  Assessment  Malnutrition Type: chronic illness          Weight Loss (Malnutrition): (17% x 1 month)  Energy Intake (Malnutrition): less than 75% for greater than or equal to 3 months   Upper Arm Region (Subcutaneous Fat Loss): well nourished   [place] Region (Muscle Loss): mild depletion  Clavicle Bone Region (Muscle Loss): mild depletion  Clavicle and Acromion Bone Region (Muscle Loss): well nourished  Anterior Thigh Region (Muscle Loss): well nourished                 Nutrition Follow-Up    RD Follow-up?: Yes

## 2020-10-08 NOTE — PLAN OF CARE
10/08/20 1416   Discharge Reassessment   Assessment Type Discharge Planning Reassessment   Provided patient/caregiver education on the expected discharge date and the discharge plan Yes   Do you have any problems affording any of your prescribed medications? No   Discharge Plan A Long-term acute care facility (LTAC)   Discharge Plan B Rehab   DME Needed Upon Discharge  none   Anticipated Discharge Disposition LTAC

## 2020-10-08 NOTE — PROGRESS NOTES
[facility]  Colorectal Surgery  Progress Note    Patient Name: Edita Riley  MRN: [id number]  Admission Date: 9/11/2020  Hospital Length of Stay: 27 days  Attending Physician: Hammad Reynolds MD    Subjective:     Interval History: Patient seen and examined this morning. No acute events overnight. WBC normalized, CRP downtrending. Vitals stable. Patient endorses feeling well. Awaiting insurance authorization to LTAC.     Post-Op Info:  Procedure(s) (LRB):  Nephrostogram - antegrade (Bilateral)  REMOVAL, STENT, URETER (Bilateral)   10 Days Post-Op      Medications:  Continuous Infusions:   electrolyte-A 1,000 mL (10/07/20 2327)     Scheduled Meds:   ceFEPime (MAXIPIME) IVPB  1 g Intravenous Q8H    cyanocobalamin  100 mcg Intramuscular Weekly    enoxaparin  40 mg Subcutaneous Q24H    epoetin yecenia-epbx  20,000 Units Subcutaneous Q48H    fluconazole  400 mg Oral Daily    loperamide  2 mg Oral BID    metoprolol tartrate  25 mg Oral BID    metroNIDAZOLE  500 mg Oral Q8H    pantoprazole  40 mg Intravenous Daily    psyllium husk (aspartame)  1 packet Oral Daily    sodium chloride 0.9%  10 mL Intravenous Q6H     PRN Meds:   acetaminophen    acetaminophen    dextrose 50%    glucagon (human recombinant)    HYDROmorphone    insulin aspart U-100    iohexol    iohexol    labetalol    ondansetron    oxyCODONE-acetaminophen    oxyCODONE-acetaminophen    sodium chloride 0.9%    sodium chloride 0.9%    sodium chloride 0.9%        Objective:     Vital Signs (Most Recent):  Temp: 98 °F (36.7 °C) (10/08/20 0001)  Pulse: 93 (10/08/20 0336)  Resp: 20 (10/08/20 0336)  BP: (!) 148/80 (10/08/20 0336)  SpO2: 97 % (10/08/20 0336) Vital Signs (24h Range):  Temp:  [97 °F (36.1 °C)-99.7 °F (37.6 °C)] 98 °F (36.7 °C)  Pulse:  [] 93  Resp:  [17-26] 20  SpO2:  [94 %-98 %] 97 %  BP: (145-152)/(79-82) 148/80     Intake/Output - Last 3 Shifts       10/06 0700 - 10/07 0659 10/07 0700 - 10/08  0659 10/08 0700 - 10/09 0659    P.O.  1400     I.V. (mL/kg)  800 (9)     Total Intake(mL/kg)  2200 (24.7)     Urine (mL/kg/hr) 1200 (0.6) 1003 (0.5) 300 (3.7)    Emesis/NG output  100     Drains 160 59     Other  25     Stool 675 445     Total Output 2035 1632 300    Net -2035 +568 -300           Emesis Occurrence  1 x           Physical Exam  Vitals signs and nursing note reviewed.   Constitutional:       Appearance: She is well-developed. She is not ill-appearing.   HENT:      Head: Normocephalic.   Eyes:      Pupils: Pupils are equal, round, and reactive to light.   Cardiovascular:      Rate and Rhythm: Normal rate and regular rhythm.      Heart sounds: Normal heart sounds.   Pulmonary:      Effort: Pulmonary effort is normal. No respiratory distress.      Breath sounds: Normal breath sounds. No wheezing or rales.   Abdominal:      Palpations: Abdomen is soft. There is no mass.      Tenderness: There is no guarding or rebound.      Comments: Ileostomy in place to RLQ with gas and stool within ostomy bag.  Urostomy bag in place to LLQ with clear, yellow urine  Midline wound vac remains in place with excellent seal  RLQ and Pelvic IR drains in place with serous output   Musculoskeletal: Normal range of motion.   Skin:     General: Skin is warm and dry.   Neurological:      Mental Status: She is alert and oriented to person, place, and time.   Psychiatric:         Behavior: Behavior normal.         Thought Content: Thought content normal.         Judgment: Judgment normal.             Significant Labs:  CBC (Last 3 Results):   Recent Labs   Lab 10/06/20  0428 10/07/20  0420 10/08/20  0513   WBC 13.79* 11.59 8.32   RBC 2.83* 2.53* 2.54*   HGB 7.6* 6.8* 6.6*   HCT 26.1* 24.3* 24.2*   * 301 309   MCV 92 96 95   MCH 26.9* 26.9* 26.0*   MCHC 29.1* 28.0* 27.3*     CMP (Last 3 Results):   Recent Labs   Lab 10/06/20  0428 10/07/20  0420 10/08/20  0512    98 101   CALCIUM 8.4* 8.5* 8.2*   ALBUMIN 2.0* 1.8* 1.8*    PROT 7.0 6.5 6.3    144 143   K 3.4* 3.4* 2.9*   CO2 24 25 26    107 108   BUN 8 10 11   CREATININE 0.9 0.9 0.9   ALKPHOS 115 95 84   ALT 8* 5* 5*   AST 12 9* 9*   BILITOT 0.4 0.3 0.2     CRP (Last 3 Results):   Recent Labs   Lab 10/06/20  0428 10/07/20  0420 10/08/20  0513   .7* 181.9* 95.2*       Significant Diagnostics:  I have reviewed all pertinent imaging results/findings within the past 24 hours.    Assessment/Plan:     * Bilateral ureteral obstruction  Ms. Riley is a 55 y.o. female with b/l ureteral obstruction s/p transverse colon conduit on 9/11/2020. Extended R colectomy and ileostomy creation on 9/17.     - patient tolerating diabetic diet well  - vital signs stable  - WBC normalized to 8.3 from 10  - continue IV antibiotics per ID's recs  - f/u cultures from recent IR drain placement  - 2 IR drains in place with serosanguinous output  - CRP 95.1 from 181; continue to trend  - mIVFs electrolyte-A @100mL/hr, per nephro recs; patient to follow-up with nephro in 4 wks  - ileostomy and urostomy in place with appropriate output  - wound care following for vac changes twice weekly  - continue to monitor I/Os  - lovenox for DVT ppx  - OOB, activity as tolerated  - PT/OT  - Discharge planning with EDWARDO Martinez MD  Colorectal Surgery  [facility]

## 2020-10-09 LAB
ALBUMIN SERPL BCP-MCNC: 1.9 G/DL (ref 3.5–5.2)
ALP SERPL-CCNC: 85 U/L (ref 55–135)
ALT SERPL W/O P-5'-P-CCNC: 5 U/L (ref 10–44)
ANION GAP SERPL CALC-SCNC: 11 MMOL/L (ref 8–16)
AST SERPL-CCNC: 10 U/L (ref 10–40)
BASOPHILS # BLD AUTO: 0.02 K/UL (ref 0–0.2)
BASOPHILS NFR BLD: 0.3 % (ref 0–1.9)
BILIRUB SERPL-MCNC: 0.2 MG/DL (ref 0.1–1)
BUN SERPL-MCNC: 11 MG/DL (ref 6–20)
CALCIUM SERPL-MCNC: 8.3 MG/DL (ref 8.7–10.5)
CHLORIDE SERPL-SCNC: 109 MMOL/L (ref 95–110)
CO2 SERPL-SCNC: 27 MMOL/L (ref 23–29)
CREAT SERPL-MCNC: 0.9 MG/DL (ref 0.5–1.4)
CRP SERPL-MCNC: 64.5 MG/L (ref 0–8.2)
DIFFERENTIAL METHOD: ABNORMAL
EOSINOPHIL # BLD AUTO: 0.2 K/UL (ref 0–0.5)
EOSINOPHIL NFR BLD: 3.5 % (ref 0–8)
ERYTHROCYTE [DISTWIDTH] IN BLOOD BY AUTOMATED COUNT: 19.3 % (ref 11.5–14.5)
EST. GFR  (AFRICAN AMERICAN): >60 ML/MIN/1.73 M^2
EST. GFR  (NON AFRICAN AMERICAN): >60 ML/MIN/1.73 M^2
GLUCOSE SERPL-MCNC: 101 MG/DL (ref 70–110)
HCT VFR BLD AUTO: 23.7 % (ref 37–48.5)
HGB BLD-MCNC: 6.6 G/DL (ref 12–16)
IMM GRANULOCYTES # BLD AUTO: 0.03 K/UL (ref 0–0.04)
IMM GRANULOCYTES NFR BLD AUTO: 0.5 % (ref 0–0.5)
LYMPHOCYTES # BLD AUTO: 1 K/UL (ref 1–4.8)
LYMPHOCYTES NFR BLD: 15.5 % (ref 18–48)
MAGNESIUM SERPL-MCNC: 1.6 MG/DL (ref 1.6–2.6)
MCH RBC QN AUTO: 26.1 PG (ref 27–31)
MCHC RBC AUTO-ENTMCNC: 27.8 G/DL (ref 32–36)
MCV RBC AUTO: 94 FL (ref 82–98)
MONOCYTES # BLD AUTO: 0.8 K/UL (ref 0.3–1)
MONOCYTES NFR BLD: 11.4 % (ref 4–15)
NEUTROPHILS # BLD AUTO: 4.6 K/UL (ref 1.8–7.7)
NEUTROPHILS NFR BLD: 68.8 % (ref 38–73)
NRBC BLD-RTO: 1 /100 WBC
PHOSPHATE SERPL-MCNC: 2.8 MG/DL (ref 2.7–4.5)
PLATELET # BLD AUTO: 345 K/UL (ref 150–350)
PMV BLD AUTO: 10 FL (ref 9.2–12.9)
POCT GLUCOSE: 100 MG/DL (ref 70–110)
POCT GLUCOSE: 113 MG/DL (ref 70–110)
POCT GLUCOSE: 115 MG/DL (ref 70–110)
POCT GLUCOSE: 97 MG/DL (ref 70–110)
POTASSIUM SERPL-SCNC: 2.9 MMOL/L (ref 3.5–5.1)
PROT SERPL-MCNC: 6.3 G/DL (ref 6–8.4)
RBC # BLD AUTO: 2.53 M/UL (ref 4–5.4)
SODIUM SERPL-SCNC: 147 MMOL/L (ref 136–145)
WBC # BLD AUTO: 6.6 K/UL (ref 3.9–12.7)

## 2020-10-09 PROCEDURE — 80053 COMPREHEN METABOLIC PANEL: CPT

## 2020-10-09 PROCEDURE — 63600175 PHARM REV CODE 636 W HCPCS: Performed by: STUDENT IN AN ORGANIZED HEALTH CARE EDUCATION/TRAINING PROGRAM

## 2020-10-09 PROCEDURE — 25000003 PHARM REV CODE 250: Performed by: INTERNAL MEDICINE

## 2020-10-09 PROCEDURE — 25000003 PHARM REV CODE 250: Performed by: STUDENT IN AN ORGANIZED HEALTH CARE EDUCATION/TRAINING PROGRAM

## 2020-10-09 PROCEDURE — 85025 COMPLETE CBC W/AUTO DIFF WBC: CPT

## 2020-10-09 PROCEDURE — 83735 ASSAY OF MAGNESIUM: CPT

## 2020-10-09 PROCEDURE — 97606 NEG PRS WND THER DME>50 SQCM: CPT

## 2020-10-09 PROCEDURE — 36415 COLL VENOUS BLD VENIPUNCTURE: CPT

## 2020-10-09 PROCEDURE — 25000003 PHARM REV CODE 250: Performed by: NURSE PRACTITIONER

## 2020-10-09 PROCEDURE — A4216 STERILE WATER/SALINE, 10 ML: HCPCS | Performed by: STUDENT IN AN ORGANIZED HEALTH CARE EDUCATION/TRAINING PROGRAM

## 2020-10-09 PROCEDURE — 84100 ASSAY OF PHOSPHORUS: CPT

## 2020-10-09 PROCEDURE — 86140 C-REACTIVE PROTEIN: CPT

## 2020-10-09 PROCEDURE — C9113 INJ PANTOPRAZOLE SODIUM, VIA: HCPCS | Performed by: STUDENT IN AN ORGANIZED HEALTH CARE EDUCATION/TRAINING PROGRAM

## 2020-10-09 PROCEDURE — S0028 INJECTION, FAMOTIDINE, 20 MG: HCPCS | Performed by: STUDENT IN AN ORGANIZED HEALTH CARE EDUCATION/TRAINING PROGRAM

## 2020-10-09 PROCEDURE — 11000001 HC ACUTE MED/SURG PRIVATE ROOM

## 2020-10-09 PROCEDURE — 25000003 PHARM REV CODE 250: Performed by: COLON & RECTAL SURGERY

## 2020-10-09 RX ORDER — POTASSIUM CHLORIDE 7.45 MG/ML
10 INJECTION INTRAVENOUS
Status: COMPLETED | OUTPATIENT
Start: 2020-10-09 | End: 2020-10-09

## 2020-10-09 RX ORDER — FAMOTIDINE 10 MG/ML
20 INJECTION INTRAVENOUS 2 TIMES DAILY
Status: DISCONTINUED | OUTPATIENT
Start: 2020-10-09 | End: 2020-10-11

## 2020-10-09 RX ADMIN — ENOXAPARIN SODIUM 40 MG: 100 INJECTION SUBCUTANEOUS at 06:10

## 2020-10-09 RX ADMIN — POTASSIUM CHLORIDE 10 MEQ: 7.46 INJECTION, SOLUTION INTRAVENOUS at 09:10

## 2020-10-09 RX ADMIN — POTASSIUM CHLORIDE 10 MEQ: 7.46 INJECTION, SOLUTION INTRAVENOUS at 11:10

## 2020-10-09 RX ADMIN — Medication 10 ML: at 12:10

## 2020-10-09 RX ADMIN — LOPERAMIDE HYDROCHLORIDE 2 MG: 2 CAPSULE ORAL at 09:10

## 2020-10-09 RX ADMIN — POTASSIUM CHLORIDE 10 MEQ: 7.46 INJECTION, SOLUTION INTRAVENOUS at 02:10

## 2020-10-09 RX ADMIN — METRONIDAZOLE 500 MG: 500 TABLET ORAL at 09:10

## 2020-10-09 RX ADMIN — FAMOTIDINE 20 MG: 10 INJECTION INTRAVENOUS at 09:10

## 2020-10-09 RX ADMIN — PANTOPRAZOLE SODIUM 40 MG: 40 INJECTION, POWDER, LYOPHILIZED, FOR SOLUTION INTRAVENOUS at 10:10

## 2020-10-09 RX ADMIN — METRONIDAZOLE 500 MG: 500 TABLET ORAL at 01:10

## 2020-10-09 RX ADMIN — FLUCONAZOLE 400 MG: 200 TABLET ORAL at 09:10

## 2020-10-09 RX ADMIN — CEFEPIME 1 G: 1 INJECTION, POWDER, FOR SOLUTION INTRAMUSCULAR; INTRAVENOUS at 09:10

## 2020-10-09 RX ADMIN — CEFEPIME 1 G: 1 INJECTION, POWDER, FOR SOLUTION INTRAMUSCULAR; INTRAVENOUS at 12:10

## 2020-10-09 RX ADMIN — Medication 10 ML: at 01:10

## 2020-10-09 RX ADMIN — METOPROLOL TARTRATE 25 MG: 25 TABLET, FILM COATED ORAL at 09:10

## 2020-10-09 RX ADMIN — POTASSIUM CHLORIDE 10 MEQ: 7.46 INJECTION, SOLUTION INTRAVENOUS at 01:10

## 2020-10-09 RX ADMIN — SODIUM CHLORIDE, SODIUM GLUCONATE, SODIUM ACETATE, POTASSIUM CHLORIDE AND MAGNESIUM CHLORIDE 1000 ML: 526; 502; 368; 37; 30 INJECTION, SOLUTION INTRAVENOUS at 01:10

## 2020-10-09 RX ADMIN — EPOETIN ALFA-EPBX 20000 UNITS: 10000 INJECTION, SOLUTION INTRAVENOUS; SUBCUTANEOUS at 06:10

## 2020-10-09 RX ADMIN — Medication 10 ML: at 06:10

## 2020-10-09 RX ADMIN — METRONIDAZOLE 500 MG: 500 TABLET ORAL at 06:10

## 2020-10-09 RX ADMIN — CEFEPIME 1 G: 1 INJECTION, POWDER, FOR SOLUTION INTRAMUSCULAR; INTRAVENOUS at 06:10

## 2020-10-09 NOTE — ASSESSMENT & PLAN NOTE
Ms. Riley is a 55 y.o. female with b/l ureteral obstruction s/p transverse colon conduit on 9/11/2020. Extended R colectomy and ileostomy creation on 9/17.     - patient tolerating diabetic diet well  - one episode of emesis yesterday; nausea prns in place  - famotidine 20mg added today for reflux  - vital signs stable  - WBC normal; 6.6 today  - continue IV antibiotics per ID's recs  - NGTD from recent IR drain cultures  - 2 IR drains in place with serosanguinous output  - CRP 64.5 from 95.1; continue to trend  - mIVFs electrolyte-A @100mL/hr, per nephro recs; patient to follow-up with nephro in 4 wks  - K replaced this am  - ileostomy and urostomy in place with appropriate output  - wound care following for vac changes twice weekly  - continue to monitor I/Os  - lovenox for DVT ppx  - OOB, activity as tolerated  - PT/OT  - Discharge planning with EDWARDO

## 2020-10-09 NOTE — PROGRESS NOTES
Wound care follow up:  Abdomen wound vac dressing changed as directed.  Abdomen wounds with much improvement and increasing granulation. Changed leaking ileostomy pouch using Lico ring and 3M cavilon spray. Stoma pink and raised with possible beginning of mucosal separation. Will monitor and re-assess next week. [id number]

## 2020-10-09 NOTE — PROGRESS NOTES
[facility]  Colorectal Surgery  Progress Note    Patient Name: Edita Riley  MRN: [id number]  Admission Date: 9/11/2020  Hospital Length of Stay: 28 days  Attending Physician: Hammad Reynolds MD    Subjective:     Interval History: Patient seen and examined this am. Endorses an episode of emesis yesterday. States that the nausea medications that she takes are helpful. Denies any pain or fevers at this time. Insurance authorization denied yesterday. Continue to work on placement.     Post-Op Info:  Procedure(s) (LRB):  Nephrostogram - antegrade (Bilateral)  REMOVAL, STENT, URETER (Bilateral)   11 Days Post-Op      Medications:  Continuous Infusions:   electrolyte-A 1,000 mL (10/08/20 1034)     Scheduled Meds:   ceFEPime (MAXIPIME) IVPB  1 g Intravenous Q8H    cyanocobalamin  100 mcg Intramuscular Weekly    enoxaparin  40 mg Subcutaneous Q24H    epoetin yecenia-epbx  20,000 Units Subcutaneous Q48H    fluconazole  400 mg Oral Daily    loperamide  2 mg Oral BID    metoprolol tartrate  25 mg Oral BID    metroNIDAZOLE  500 mg Oral Q8H    pantoprazole  40 mg Intravenous Daily    psyllium husk (aspartame)  1 packet Oral Daily    sodium chloride 0.9%  10 mL Intravenous Q6H     PRN Meds:   acetaminophen    acetaminophen    dextrose 50%    glucagon (human recombinant)    HYDROmorphone    insulin aspart U-100    iohexol    iohexol    labetalol    ondansetron    oxyCODONE-acetaminophen    oxyCODONE-acetaminophen    sodium chloride 0.9%    sodium chloride 0.9%    sodium chloride 0.9%        Objective:     Vital Signs (Most Recent):  Temp: 97 °F (36.1 °C) (10/09/20 0446)  Pulse: 101 (10/09/20 0446)  Resp: 18 (10/09/20 0446)  BP: (!) 160/74 (10/09/20 0446)  SpO2: 95 % (10/09/20 0446) Vital Signs (24h Range):  Temp:  [96.4 °F (35.8 °C)-97.9 °F (36.6 °C)] 97 °F (36.1 °C)  Pulse:  [] 101  Resp:  [18] 18  SpO2:  [95 %-98 %] 95 %  BP: (131-160)/(73-77) 160/74     Intake/Output -  Last 3 Shifts       10/07 0700 - 10/08 0659 10/08 0700 - 10/09 0659 10/09 0700 - 10/10 0659    P.O. 1400      I.V. (mL/kg) 800 (9)      Total Intake(mL/kg) 2200 (24.7)      Urine (mL/kg/hr) 1003 (0.5) 1200 (0.6)     Emesis/NG output 100 7     Drains 59 10     Other 25 0     Stool 445 850     Total Output 1632 2067     Net +568 -2067            Emesis Occurrence 1 x            Physical Exam      Significant Labs:  CBC (Last 3 Results):   Recent Labs   Lab 10/07/20  0420 10/08/20  0513 10/09/20  0553   WBC 11.59 8.32 6.60   RBC 2.53* 2.54* 2.53*   HGB 6.8* 6.6* 6.6*   HCT 24.3* 24.2* 23.7*    309 345   MCV 96 95 94   MCH 26.9* 26.0* 26.1*   MCHC 28.0* 27.3* 27.8*     CMP (Last 3 Results):   Recent Labs   Lab 10/07/20  0420 10/08/20  0512 10/09/20  0553   GLU 98 101 101   CALCIUM 8.5* 8.2* 8.3*   ALBUMIN 1.8* 1.8* 1.9*   PROT 6.5 6.3 6.3    143 147*   K 3.4* 2.9* 2.9*   CO2 25 26 27    108 109   BUN 10 11 11   CREATININE 0.9 0.9 0.9   ALKPHOS 95 84 85   ALT 5* 5* 5*   AST 9* 9* 10   BILITOT 0.3 0.2 0.2       Significant Diagnostics:  I have reviewed all pertinent imaging results/findings within the past 24 hours.    Assessment/Plan:     * Bilateral ureteral obstruction  Ms. Riley is a 55 y.o. female with b/l ureteral obstruction s/p transverse colon conduit on 9/11/2020. Extended R colectomy and ileostomy creation on 9/17.     - patient tolerating diabetic diet well  - one episode of emesis yesterday; nausea prns in place  - famotidine 20mg added today for reflux  - vital signs stable  - WBC normal; 6.6 today  - continue IV antibiotics per ID's recs  - NGTD from recent IR drain cultures  - 2 IR drains in place with serosanguinous output  - CRP 64.5 from 95.1; continue to trend  - mIVFs electrolyte-A @100mL/hr, per nephro recs; patient to follow-up with nephro in 4 wks  - K replaced this am  - ileostomy and urostomy in place with appropriate output  - wound care following for vac changes twice  weekly  - continue to monitor I/Os  - lovenox for DVT ppx  - OOB, activity as tolerated  - PT/OT  - Discharge planning with EDWARDO Martinez MD  Colorectal Surgery  [facility]

## 2020-10-09 NOTE — ASSESSMENT & PLAN NOTE
S/p urinary diversion with colon conduit 9/11   S/p emergent ex-lap 9/17, colo-colic anastomosis intact, bowel perforation found, underwent resection of right colon, remaining transverses colon, and proximal descending colon, ileostomy creation, Charlotte's pouch created     - CRS primary  - regular diet per Primary  - nephrostomy tubes removed 10/1 - Cr stable, good UOP from ostomy  - continue PT/OT, OOBTC as tolerated, encourage ambulation  - Wound care ostomy consulted for assistance with ostomy teaching, wound care  - patient is a Restorationist; management per primary and Heme-Onc (on EPO, B12, and iron)  - Abx per ID and primary.    - Drains: maintain IR drains   - Prophylaxis: IS, SCDs, GI ppx

## 2020-10-09 NOTE — SUBJECTIVE & OBJECTIVE
Subjective:     Interval History: Patient seen and examined this am. Endorses an episode of emesis yesterday. States that the nausea medications that she takes are helpful. Denies any pain or fevers at this time. Insurance authorization denied yesterday. Continue to work on placement.     Post-Op Info:  Procedure(s) (LRB):  Nephrostogram - antegrade (Bilateral)  REMOVAL, STENT, URETER (Bilateral)   11 Days Post-Op      Medications:  Continuous Infusions:   electrolyte-A 1,000 mL (10/08/20 1034)     Scheduled Meds:   ceFEPime (MAXIPIME) IVPB  1 g Intravenous Q8H    cyanocobalamin  100 mcg Intramuscular Weekly    enoxaparin  40 mg Subcutaneous Q24H    epoetin yecenia-epbx  20,000 Units Subcutaneous Q48H    fluconazole  400 mg Oral Daily    loperamide  2 mg Oral BID    metoprolol tartrate  25 mg Oral BID    metroNIDAZOLE  500 mg Oral Q8H    pantoprazole  40 mg Intravenous Daily    psyllium husk (aspartame)  1 packet Oral Daily    sodium chloride 0.9%  10 mL Intravenous Q6H     PRN Meds:   acetaminophen    acetaminophen    dextrose 50%    glucagon (human recombinant)    HYDROmorphone    insulin aspart U-100    iohexol    iohexol    labetalol    ondansetron    oxyCODONE-acetaminophen    oxyCODONE-acetaminophen    sodium chloride 0.9%    sodium chloride 0.9%    sodium chloride 0.9%        Objective:     Vital Signs (Most Recent):  Temp: 97 °F (36.1 °C) (10/09/20 0446)  Pulse: 101 (10/09/20 0446)  Resp: 18 (10/09/20 0446)  BP: (!) 160/74 (10/09/20 0446)  SpO2: 95 % (10/09/20 0446) Vital Signs (24h Range):  Temp:  [96.4 °F (35.8 °C)-97.9 °F (36.6 °C)] 97 °F (36.1 °C)  Pulse:  [] 101  Resp:  [18] 18  SpO2:  [95 %-98 %] 95 %  BP: (131-160)/(73-77) 160/74     Intake/Output - Last 3 Shifts       10/07 0700 - 10/08 0659 10/08 0700 - 10/09 0659 10/09 0700 - 10/10 0659    P.O. 1400      I.V. (mL/kg) 800 (9)      Total Intake(mL/kg) 2200 (24.7)      Urine (mL/kg/hr) 1003 (0.5) 1200 (0.6)      Emesis/NG output 100 7     Drains 59 10     Other 25 0     Stool 445 850     Total Output 1632 2067     Net +568 -2067            Emesis Occurrence 1 x            Physical Exam      Significant Labs:  CBC (Last 3 Results):   Recent Labs   Lab 10/07/20  0420 10/08/20  0513 10/09/20  0553   WBC 11.59 8.32 6.60   RBC 2.53* 2.54* 2.53*   HGB 6.8* 6.6* 6.6*   HCT 24.3* 24.2* 23.7*    309 345   MCV 96 95 94   MCH 26.9* 26.0* 26.1*   MCHC 28.0* 27.3* 27.8*     CMP (Last 3 Results):   Recent Labs   Lab 10/07/20  0420 10/08/20  0512 10/09/20  0553   GLU 98 101 101   CALCIUM 8.5* 8.2* 8.3*   ALBUMIN 1.8* 1.8* 1.9*   PROT 6.5 6.3 6.3    143 147*   K 3.4* 2.9* 2.9*   CO2 25 26 27    108 109   BUN 10 11 11   CREATININE 0.9 0.9 0.9   ALKPHOS 95 84 85   ALT 5* 5* 5*   AST 9* 9* 10   BILITOT 0.3 0.2 0.2       Significant Diagnostics:  I have reviewed all pertinent imaging results/findings within the past 24 hours.

## 2020-10-09 NOTE — PLAN OF CARE
Patient continues to struggle with LTAC placement for discharge.Few referred facilities are following currently. CM team continues to follow to seek placement.      Kira Dawkins LMSW  Case Management   [facility]   Ext. [phone]

## 2020-10-10 PROBLEM — N13.5 BILATERAL URETERAL OBSTRUCTION: Chronic | Status: ACTIVE | Noted: 2020-09-11

## 2020-10-10 LAB
ALBUMIN SERPL BCP-MCNC: 1.9 G/DL (ref 3.5–5.2)
ALP SERPL-CCNC: 73 U/L (ref 55–135)
ALT SERPL W/O P-5'-P-CCNC: 6 U/L (ref 10–44)
ANION GAP SERPL CALC-SCNC: 8 MMOL/L (ref 8–16)
ANISOCYTOSIS BLD QL SMEAR: SLIGHT
AST SERPL-CCNC: 12 U/L (ref 10–40)
BASOPHILS # BLD AUTO: 0.03 K/UL (ref 0–0.2)
BASOPHILS NFR BLD: 0.5 % (ref 0–1.9)
BILIRUB SERPL-MCNC: 0.2 MG/DL (ref 0.1–1)
BUN SERPL-MCNC: 8 MG/DL (ref 6–20)
CALCIUM SERPL-MCNC: 8.2 MG/DL (ref 8.7–10.5)
CHLORIDE SERPL-SCNC: 107 MMOL/L (ref 95–110)
CO2 SERPL-SCNC: 28 MMOL/L (ref 23–29)
CREAT SERPL-MCNC: 0.8 MG/DL (ref 0.5–1.4)
CRP SERPL-MCNC: 54 MG/L (ref 0–8.2)
DIFFERENTIAL METHOD: ABNORMAL
EOSINOPHIL # BLD AUTO: 0.2 K/UL (ref 0–0.5)
EOSINOPHIL NFR BLD: 3.6 % (ref 0–8)
ERYTHROCYTE [DISTWIDTH] IN BLOOD BY AUTOMATED COUNT: 19.2 % (ref 11.5–14.5)
EST. GFR  (AFRICAN AMERICAN): >60 ML/MIN/1.73 M^2
EST. GFR  (NON AFRICAN AMERICAN): >60 ML/MIN/1.73 M^2
GLUCOSE SERPL-MCNC: 101 MG/DL (ref 70–110)
HCT VFR BLD AUTO: 24.4 % (ref 37–48.5)
HGB BLD-MCNC: 6.8 G/DL (ref 12–16)
HYPOCHROMIA BLD QL SMEAR: ABNORMAL
IMM GRANULOCYTES # BLD AUTO: 0.03 K/UL (ref 0–0.04)
IMM GRANULOCYTES NFR BLD AUTO: 0.5 % (ref 0–0.5)
LYMPHOCYTES # BLD AUTO: 1.2 K/UL (ref 1–4.8)
LYMPHOCYTES NFR BLD: 17.8 % (ref 18–48)
MAGNESIUM SERPL-MCNC: 1.4 MG/DL (ref 1.6–2.6)
MCH RBC QN AUTO: 26 PG (ref 27–31)
MCHC RBC AUTO-ENTMCNC: 27.9 G/DL (ref 32–36)
MCV RBC AUTO: 93 FL (ref 82–98)
MONOCYTES # BLD AUTO: 0.8 K/UL (ref 0.3–1)
MONOCYTES NFR BLD: 12.4 % (ref 4–15)
NEUTROPHILS # BLD AUTO: 4.3 K/UL (ref 1.8–7.7)
NEUTROPHILS NFR BLD: 65.2 % (ref 38–73)
NRBC BLD-RTO: 1 /100 WBC
PHOSPHATE SERPL-MCNC: 2.5 MG/DL (ref 2.7–4.5)
PLATELET # BLD AUTO: 341 K/UL (ref 150–350)
PLATELET BLD QL SMEAR: ABNORMAL
PMV BLD AUTO: 9.6 FL (ref 9.2–12.9)
POCT GLUCOSE: 102 MG/DL (ref 70–110)
POLYCHROMASIA BLD QL SMEAR: ABNORMAL
POTASSIUM SERPL-SCNC: 2.9 MMOL/L (ref 3.5–5.1)
PROT SERPL-MCNC: 6.2 G/DL (ref 6–8.4)
RBC # BLD AUTO: 2.62 M/UL (ref 4–5.4)
SODIUM SERPL-SCNC: 143 MMOL/L (ref 136–145)
TRIGL SERPL-MCNC: 132 MG/DL (ref 30–150)
WBC # BLD AUTO: 6.59 K/UL (ref 3.9–12.7)

## 2020-10-10 PROCEDURE — 25000003 PHARM REV CODE 250: Performed by: NURSE PRACTITIONER

## 2020-10-10 PROCEDURE — 97530 THERAPEUTIC ACTIVITIES: CPT | Mod: CQ

## 2020-10-10 PROCEDURE — A4216 STERILE WATER/SALINE, 10 ML: HCPCS | Performed by: STUDENT IN AN ORGANIZED HEALTH CARE EDUCATION/TRAINING PROGRAM

## 2020-10-10 PROCEDURE — 25000003 PHARM REV CODE 250: Performed by: STUDENT IN AN ORGANIZED HEALTH CARE EDUCATION/TRAINING PROGRAM

## 2020-10-10 PROCEDURE — 86140 C-REACTIVE PROTEIN: CPT

## 2020-10-10 PROCEDURE — 84100 ASSAY OF PHOSPHORUS: CPT

## 2020-10-10 PROCEDURE — 63600175 PHARM REV CODE 636 W HCPCS: Performed by: STUDENT IN AN ORGANIZED HEALTH CARE EDUCATION/TRAINING PROGRAM

## 2020-10-10 PROCEDURE — 84478 ASSAY OF TRIGLYCERIDES: CPT

## 2020-10-10 PROCEDURE — 97535 SELF CARE MNGMENT TRAINING: CPT

## 2020-10-10 PROCEDURE — 36415 COLL VENOUS BLD VENIPUNCTURE: CPT

## 2020-10-10 PROCEDURE — 11000001 HC ACUTE MED/SURG PRIVATE ROOM

## 2020-10-10 PROCEDURE — 83735 ASSAY OF MAGNESIUM: CPT

## 2020-10-10 PROCEDURE — 97116 GAIT TRAINING THERAPY: CPT | Mod: CQ

## 2020-10-10 PROCEDURE — 25000003 PHARM REV CODE 250: Performed by: COLON & RECTAL SURGERY

## 2020-10-10 PROCEDURE — 94761 N-INVAS EAR/PLS OXIMETRY MLT: CPT

## 2020-10-10 PROCEDURE — 97530 THERAPEUTIC ACTIVITIES: CPT

## 2020-10-10 PROCEDURE — C9113 INJ PANTOPRAZOLE SODIUM, VIA: HCPCS | Performed by: STUDENT IN AN ORGANIZED HEALTH CARE EDUCATION/TRAINING PROGRAM

## 2020-10-10 PROCEDURE — 80053 COMPREHEN METABOLIC PANEL: CPT

## 2020-10-10 PROCEDURE — 85025 COMPLETE CBC W/AUTO DIFF WBC: CPT

## 2020-10-10 PROCEDURE — 25000003 PHARM REV CODE 250: Performed by: INTERNAL MEDICINE

## 2020-10-10 PROCEDURE — S0028 INJECTION, FAMOTIDINE, 20 MG: HCPCS | Performed by: STUDENT IN AN ORGANIZED HEALTH CARE EDUCATION/TRAINING PROGRAM

## 2020-10-10 RX ORDER — POTASSIUM CHLORIDE 7.45 MG/ML
10 INJECTION INTRAVENOUS
Status: COMPLETED | OUTPATIENT
Start: 2020-10-10 | End: 2020-10-10

## 2020-10-10 RX ADMIN — SODIUM CHLORIDE, SODIUM GLUCONATE, SODIUM ACETATE, POTASSIUM CHLORIDE AND MAGNESIUM CHLORIDE 1000 ML: 526; 502; 368; 37; 30 INJECTION, SOLUTION INTRAVENOUS at 06:10

## 2020-10-10 RX ADMIN — METRONIDAZOLE 500 MG: 500 TABLET ORAL at 03:10

## 2020-10-10 RX ADMIN — FLUCONAZOLE 400 MG: 200 TABLET ORAL at 08:10

## 2020-10-10 RX ADMIN — POTASSIUM CHLORIDE 10 MEQ: 10 INJECTION, SOLUTION INTRAVENOUS at 11:10

## 2020-10-10 RX ADMIN — METOPROLOL TARTRATE 25 MG: 25 TABLET, FILM COATED ORAL at 09:10

## 2020-10-10 RX ADMIN — Medication 10 ML: at 06:10

## 2020-10-10 RX ADMIN — POTASSIUM CHLORIDE 10 MEQ: 10 INJECTION, SOLUTION INTRAVENOUS at 09:10

## 2020-10-10 RX ADMIN — LOPERAMIDE HYDROCHLORIDE 2 MG: 2 CAPSULE ORAL at 09:10

## 2020-10-10 RX ADMIN — CEFEPIME 1 G: 1 INJECTION, POWDER, FOR SOLUTION INTRAMUSCULAR; INTRAVENOUS at 08:10

## 2020-10-10 RX ADMIN — CEFEPIME 1 G: 1 INJECTION, POWDER, FOR SOLUTION INTRAMUSCULAR; INTRAVENOUS at 05:10

## 2020-10-10 RX ADMIN — ENOXAPARIN SODIUM 40 MG: 100 INJECTION SUBCUTANEOUS at 05:10

## 2020-10-10 RX ADMIN — Medication 10 ML: at 12:10

## 2020-10-10 RX ADMIN — FAMOTIDINE 20 MG: 10 INJECTION INTRAVENOUS at 09:10

## 2020-10-10 RX ADMIN — METRONIDAZOLE 500 MG: 500 TABLET ORAL at 09:10

## 2020-10-10 RX ADMIN — METRONIDAZOLE 500 MG: 500 TABLET ORAL at 06:10

## 2020-10-10 RX ADMIN — CEFEPIME 1 G: 1 INJECTION, POWDER, FOR SOLUTION INTRAMUSCULAR; INTRAVENOUS at 12:10

## 2020-10-10 RX ADMIN — FAMOTIDINE 20 MG: 10 INJECTION INTRAVENOUS at 08:10

## 2020-10-10 RX ADMIN — POTASSIUM CHLORIDE 10 MEQ: 10 INJECTION, SOLUTION INTRAVENOUS at 12:10

## 2020-10-10 RX ADMIN — SODIUM CHLORIDE, SODIUM GLUCONATE, SODIUM ACETATE, POTASSIUM CHLORIDE AND MAGNESIUM CHLORIDE 1000 ML: 526; 502; 368; 37; 30 INJECTION, SOLUTION INTRAVENOUS at 01:10

## 2020-10-10 RX ADMIN — PANTOPRAZOLE SODIUM 40 MG: 40 INJECTION, POWDER, LYOPHILIZED, FOR SOLUTION INTRAVENOUS at 08:10

## 2020-10-10 RX ADMIN — CYANOCOBALAMIN 100 MCG: 1000 INJECTION, SOLUTION INTRAMUSCULAR at 08:10

## 2020-10-10 RX ADMIN — METOPROLOL TARTRATE 25 MG: 25 TABLET, FILM COATED ORAL at 08:10

## 2020-10-10 RX ADMIN — POTASSIUM CHLORIDE 10 MEQ: 10 INJECTION, SOLUTION INTRAVENOUS at 10:10

## 2020-10-10 NOTE — PROGRESS NOTES
[facility]  Urology  Progress Note    Patient Name: Edita Riley  MRN: [id number]  Admission Date: 9/11/2020  Hospital Length of Stay: 29 days  Code Status: Full Code   Attending Provider: Hammad Reynolds MD   Primary Care Physician: Audrey Mustafa MD    Subjective:     HPI:  Edita Riley is a 55 y.o. female with a history of cervical cancer s/p pelvic radiation. She has since developed bilateral ureteral strictures and bilateral hydronephrosis R>L. She had a MAG3 scan confirmed presence of bilateral obstruction. She is s/p open transverse colon conduit urinary diversion on 9/11/2020.     Interval History:   Patient feels well this morning. Tolerating diet.   IR drains slowing down in output.   UOP good per urostomy.       Objective:     Temp:  [96.4 °F (35.8 °C)-97.9 °F (36.6 °C)] 97 °F (36.1 °C)  Pulse:  [] 101  Resp:  [18] 18  SpO2:  [95 %-98 %] 95 %  BP: (131-160)/(73-77) 160/74     Body mass index is 28.94 kg/m².           Drains     Drain                 Urostomy 09/11/20 ileal conduit LLQ 27 days         Ileostomy 09/18/20 RLQ 20 days         Closed/Suction Drain 10/05/20 1038 Right;Superior Abdomen Bulb 10 Fr. 2 days         Closed/Suction Drain 10/05/20 1039 Right;Inferior Bulb 10 Fr. 2 days                Physical Exam   Constitutional: No distress.   HENT:   Head: Normocephalic and atraumatic.   Eyes: Conjunctivae are normal. No scleral icterus.   Neck: Normal range of motion.   Cardiovascular: Normal rate.    Pulmonary/Chest: Effort normal. No respiratory distress.   Abdominal: Soft. She exhibits no distension. There is no abdominal tenderness. There is no rebound and no guarding.   Urostomy p/p/p draining clear yellow urine  Ileostomy output thickened   Midline incision with wound vac in place   Right superior drain with thin green output.   Right Inferior drain with thin green output.    Musculoskeletal: Normal range of motion.      Comments: SCDs in  place   Neurological: She is alert.   Skin: Skin is warm and dry. She is not diaphoretic.         Significant Labs:    BMP:  Recent Labs   Lab 10/07/20  0420 10/08/20  0512 10/09/20  0553    143 147*   K 3.4* 2.9* 2.9*    108 109   CO2 25 26 27   BUN 10 11 11   CREATININE 0.9 0.9 0.9   CALCIUM 8.5* 8.2* 8.3*       CBC:   Recent Labs   Lab 10/07/20  0420 10/08/20  0513 10/09/20  0553   WBC 11.59 8.32 6.60   HGB 6.8* 6.6* 6.6*   HCT 24.3* 24.2* 23.7*    309 345       Blood Culture:   Recent Labs   Lab 10/06/20  0628 10/06/20  0631   LABBLOO No Growth to date  No Growth to date  No Growth to date No Growth to date  No Growth to date  No Growth to date     Urine Culture: No results for input(s): LABURIN in the last 168 hours.  Urine Studies: No results for input(s): COLORU, APPEARANCEUA, PHUR, SPECGRAV, PROTEINUA, GLUCUA, KETONESU, BILIRUBINUA, OCCULTUA, NITRITE, UROBILINOGEN, LEUKOCYTESUR, RBCUA, WBCUA, BACTERIA, SQUAMEPITHEL, HYALINECASTS in the last 168 hours.    Invalid input(s): WRIGHTSUR    Significant Imaging:  All pertinent imaging results/findings from the past 24 hours have been reviewed.              Assessment/Plan:     * Bilateral ureteral obstruction  S/p urinary diversion with colon conduit 9/11   S/p emergent ex-lap 9/17, colo-colic anastomosis intact, bowel perforation found, underwent resection of right colon, remaining transverses colon, and proximal descending colon, ileostomy creation, Charlotte's pouch created     - CRS primary  - regular diet per Primary  - nephrostomy tubes removed 10/1 - Cr stable, good UOP from ostomy  - continue PT/OT, OOBTC as tolerated, encourage ambulation  - Wound care ostomy consulted for assistance with ostomy teaching, wound care  - patient is a Yazidism; management per primary and Heme-Onc (on EPO, B12, and iron)  - Abx per ID and primary.    - Drains: maintain IR drains   - Prophylaxis: IS, SCDs, GI ppx        VTE Risk Mitigation (From  admission, onward)         Ordered     enoxaparin injection 40 mg  Every 24 hours      10/06/20 1001     IP VTE HIGH RISK PATIENT  Once      09/11/20 2024     Place sequential compression device  Until discontinued      09/11/20 2024                Izzy Vega MD  Urology  [facility]

## 2020-10-10 NOTE — ASSESSMENT & PLAN NOTE
Ms. Riley is a 55 y.o. female with b/l ureteral obstruction s/p transverse colon conduit on 9/11/2020. Extended R colectomy and ileostomy creation on 9/17.     - patient tolerating diabetic diet well  - nausea prns in place  - famotidine 20mg for reflux  - vital signs stable  - WBC normal; 6.6 today  - continue IV antibiotics per ID's recs  - NGTD from recent IR drain cultures  - 2 IR drains in place with serosanguinous output  - CRP 54 from 64; continue to trend  - mIVFs electrolyte-A @100mL/hr, per nephro recs; patient to follow-up with nephro in 4 wks  - K replaced this am  - ileostomy and urostomy in place with appropriate output  - wound care following for vac changes twice weekly  - continue to monitor I/Os  - lovenox for DVT ppx  - OOB, activity as tolerated  - PT/OT  - Discharge planning with [facility]

## 2020-10-10 NOTE — PLAN OF CARE
Patient AAOx4. VS stable, afebrile. Drains intact. IV site clean and patent. Up with 1 person assist. Room air. Free from falls. Bed at lowest point, side rails up x2 and call light within reach. Patient verbalizes understanding of care plan and voices no concerns at this time.

## 2020-10-10 NOTE — PT/OT/SLP PROGRESS
"Physical Therapy Treatment    Patient Name:  Edita Riley   MRN:  [id number]    Recommendations:     Discharge Recommendations:  rehabilitation facility   Discharge Equipment Recommendations: walker, rolling, bedside commode   Barriers to discharge: Decreased caregiver support    Assessment:     Edita Riley is a 55 y.o. female admitted with a medical diagnosis of Bilateral ureteral obstruction.  She presents with the following impairments/functional limitations:  impaired endurance, impaired self care skills, impaired functional mobilty, impaired balance, gait instability. Today patient was able to ambulate in fonseca apprx 300 ft without a seated break and RW with SBA/CGA. Today was a good day.     Rehab Prognosis: Good; patient would benefit from acute skilled PT services to address these deficits and reach maximum level of function.    Recent Surgery: Procedure(s) (LRB):  Nephrostogram - antegrade (Bilateral)  REMOVAL, STENT, URETER (Bilateral) 12 Days Post-Op    Plan:     During this hospitalization, patient to be seen 4 x/week to address the identified rehab impairments via gait training, therapeutic activities, therapeutic exercises and progress toward the following goals:    · Plan of Care Expires:  11/21/20    Subjective     Chief Complaint: none  Patient/Family Comments/goals: "I am ready"  Pain/Comfort:  · Pain Rating 1: 0/10      Objective:     Communicated with NSG prior to session.  Patient found up in chair with peripheral IV, wound vac, ORESTES drain, colostomy upon PT entry to room.     General Precautions: Standard, fall   Orthopedic Precautions:N/A   Braces: N/A     Functional Mobility:  · Bed Mobility:  Seated up in chair  · Transfers:     · Sit to Stand:  stand by assistance with rolling walker  · Gait: Patient ambulated 300 ft with stand by assistance and contact guard assistance and rolling walker       AM-PAC 6 CLICK MOBILITY  Turning over in bed (including adjusting " bedclothes, sheets and blankets)?: 3  Sitting down on and standing up from a chair with arms (e.g., wheelchair, bedside commode, etc.): 3  Moving from lying on back to sitting on the side of the bed?: 3  Moving to and from a bed to a chair (including a wheelchair)?: 3  Need to walk in hospital room?: 3  Climbing 3-5 steps with a railing?: 2  Basic Mobility Total Score: 17       Therapeutic Activities and Exercises:    Gait training:  Patient required cues for position in walker to increase independence and safety.  Patient required cues ~ 25% of the time.       Patient Education:    Patient educated on assistive device use, Fall risk, gait training, plan of care, posture and risks of prolonged bed rest by explanation.  Patient was receptive to education and verbalizes understanding.    Patient left up in chair with all lines intact, call button in reach and nurse present..    GOALS:   Multidisciplinary Problems     Physical Therapy Goals        Problem: Physical Therapy Goal    Goal Priority Disciplines Outcome Goal Variances Interventions   Physical Therapy Goal     PT, PT/OT Ongoing, Progressing     Description: Patient re-evaluated 20 s/p further surgery.  Goals to be met by: 10/14/2020    Patient will increase functional independence with mobility by performin. Supine to sit with Set-up Fisher  2. Sit to supine with Set-up Fisher  3. Sit to stand transfer with Supervision  4. Bed to chair transfer with Supervision using Rolling Walker  5. Gait  x 250 feet with Stand-by Assistance using Rolling Walker.   6. Ascend/descend 3 stair with left Handrails Stand-by Assistance                   Time Tracking:     PT Received On: 10/10/20  PT Start Time: 1111     PT Stop Time: 1135  PT Total Time (min): 24 min     Billable Minutes: Gait Training 16 and Therapeutic Activity 8    Treatment Type: Treatment  PT/PTA: PTA     PTA Visit Number: 4     Genevieve Boston, PTA  10/10/2020    Co tx performed for  this visit due to patient need for 2 skilled therapists to ensure patient and staff safety and to accommodate for patient activity tolerance.

## 2020-10-10 NOTE — PLAN OF CARE
Pt motivated for therapy and tolerated session well. Pt with demonstrated improvement in activity tolerance as she ambulated without a rest break this session. Pt ambulated 200ft SBA using RW to increase activity tolerance required for adls and functional mobility. Pt completed grooming tasks standing at sink. Pt continues to progress toward her goals. Continue Ot POC      Problem: Occupational Therapy Goal  Goal: Occupational Therapy Goal  Description: Goals to be met by: 10/18/2020    Patient will increase functional independence with ADLs by performing:    LE Dressing with Supervision.  Grooming while standing with Supervision.   Toileting from toilet with Supervision for hygiene and clothing management.   Supine to sit with Supervision.  Toilet transfer to toilet with Supervision.    Outcome: Ongoing, Progressing

## 2020-10-10 NOTE — SUBJECTIVE & OBJECTIVE
Subjective:     Interval History: Patient seen and examined this am. No emesis. States that the nausea medications that she takes are helpful. Denies any pain or fevers at this time. Insurance authorization denied. Continue to work on placement.     Post-Op Info:  Procedure(s) (LRB):  Nephrostogram - antegrade (Bilateral)  REMOVAL, STENT, URETER (Bilateral)   12 Days Post-Op      Medications:  Continuous Infusions:   electrolyte-A 1,000 mL (10/10/20 0100)     Scheduled Meds:   ceFEPime (MAXIPIME) IVPB  1 g Intravenous Q8H    cyanocobalamin  100 mcg Intramuscular Weekly    enoxaparin  40 mg Subcutaneous Q24H    epoetin yecenia-epbx  20,000 Units Subcutaneous Q48H    famotidine (PF)  20 mg Intravenous BID    fluconazole  400 mg Oral Daily    loperamide  2 mg Oral BID    metoprolol tartrate  25 mg Oral BID    metroNIDAZOLE  500 mg Oral Q8H    pantoprazole  40 mg Intravenous Daily    psyllium husk (aspartame)  1 packet Oral Daily    sodium chloride 0.9%  10 mL Intravenous Q6H     PRN Meds:   acetaminophen    acetaminophen    dextrose 50%    glucagon (human recombinant)    HYDROmorphone    insulin aspart U-100    iohexol    iohexol    labetalol    ondansetron    oxyCODONE-acetaminophen    oxyCODONE-acetaminophen    sodium chloride 0.9%    sodium chloride 0.9%    sodium chloride 0.9%        Objective:     Vital Signs (Most Recent):  Temp: 99.1 °F (37.3 °C) (10/10/20 0714)  Pulse: 96 (10/10/20 0714)  Resp: 20 (10/10/20 0111)  BP: 134/69 (10/10/20 0714)  SpO2: 95 % (10/10/20 0714) Vital Signs (24h Range):  Temp:  [98.2 °F (36.8 °C)-99.3 °F (37.4 °C)] 99.1 °F (37.3 °C)  Pulse:  [83-97] 96  Resp:  [20-25] 20  SpO2:  [95 %-99 %] 95 %  BP: (126-143)/(69-91) 134/69     Intake/Output - Last 3 Shifts       10/08 0700 - 10/09 0659 10/09 0700 - 10/10 0659 10/10 0700 - 10/11 0659    P.O.       I.V. (mL/kg)       Other  0     Total Intake(mL/kg)  0 (0)     Urine (mL/kg/hr) 1200 (0.6) 1400 (0.7)     Emesis/NG  output 7      Drains 10 5     Other 0 0     Stool 850 675     Total Output 2067 2080     Net -2067 -2080            Urine Occurrence  350 x     Stool Occurrence  100 x           Physical Exam  Vitals signs and nursing note reviewed.   Constitutional:       Appearance: She is well-developed. She is not ill-appearing.   HENT:      Head: Normocephalic.   Eyes:      Pupils: Pupils are equal, round, and reactive to light.   Cardiovascular:      Rate and Rhythm: Normal rate and regular rhythm.      Heart sounds: Normal heart sounds.   Pulmonary:      Effort: Pulmonary effort is normal. No respiratory distress.      Breath sounds: Normal breath sounds. No wheezing or rales.   Abdominal:      Palpations: Abdomen is soft. There is no mass.      Tenderness: There is no guarding or rebound.      Comments: Ileostomy in place to RLQ with gas and stool within ostomy bag.  Urostomy bag in place to LLQ with clear, yellow urine  Midline wound vac remains in place with excellent seal  RLQ and Pelvic IR drains in place with serous output   Musculoskeletal: Normal range of motion.   Skin:     General: Skin is warm and dry.   Neurological:      Mental Status: She is alert and oriented to person, place, and time.   Psychiatric:         Behavior: Behavior normal.         Thought Content: Thought content normal.         Judgment: Judgment normal.       Significant Labs:  CBC (Last 3 Results):   Recent Labs   Lab 10/08/20  0513 10/09/20  0553 10/10/20  0515   WBC 8.32 6.60 6.59   RBC 2.54* 2.53* 2.62*   HGB 6.6* 6.6* 6.8*   HCT 24.2* 23.7* 24.4*    345 341   MCV 95 94 93   MCH 26.0* 26.1* 26.0*   MCHC 27.3* 27.8* 27.9*     CMP (Last 3 Results):   Recent Labs   Lab 10/08/20  0512 10/09/20  0553 10/10/20  0515    101 101   CALCIUM 8.2* 8.3* 8.2*   ALBUMIN 1.8* 1.9* 1.9*   PROT 6.3 6.3 6.2    147* 143   K 2.9* 2.9* 2.9*   CO2 26 27 28    109 107   BUN 11 11 8   CREATININE 0.9 0.9 0.8   ALKPHOS 84 85 73   ALT 5* 5* 6*    AST 9* 10 12   BILITOT 0.2 0.2 0.2       Significant Diagnostics:  I have reviewed all pertinent imaging results/findings within the past 24 hours.

## 2020-10-10 NOTE — PT/OT/SLP PROGRESS
Occupational Therapy   Treatment    Name: Edita Riley  MRN: [id number]  Admitting Diagnosis:  Bilateral ureteral obstruction  12 Days Post-Op    Recommendations:     Discharge Recommendations: rehabilitation facility  Discharge Equipment Recommendations:  walker, rolling, bedside commode  Barriers to discharge:  None    Assessment:     Edita Riley is a 55 y.o. female with a medical diagnosis of Bilateral ureteral obstruction.  She presents with the following Performance deficits affecting function are weakness, impaired endurance, impaired self care skills, impaired functional mobilty, gait instability, impaired balance, decreased lower extremity function, decreased safety awareness, pain.     Pt motivated for therapy and tolerated session well. Pt with demonstrated improvement in activity tolerance as she ambulated without a rest break this session. Pt ambulated 200ft SBA using RW to increase activity tolerance required for adls and functional mobility. Pt completed grooming tasks standing at sink. Pt continues to progress toward her goals. Continue Ot POC    Rehab Prognosis:  Good; patient would benefit from acute skilled OT services to address these deficits and reach maximum level of function.       Plan:     Patient to be seen 3 x/week to address the above listed problems via self-care/home management, therapeutic activities, therapeutic exercises  · Plan of Care Expires: 11/08/20  · Plan of Care Reviewed with: patient    Subjective     Pain/Comfort:  · Pain Rating 1: 0/10  · Location 1: abdomen  · Pain Addressed 1: Pre-medicate for activity    Objective:     Communicated with: RN prior to session.  Patient found up in chair with peripheral IV, wound vac, ORESTES drain, colostomy upon OT entry to room.    General Precautions: Standard, fall   Orthopedic Precautions:N/A   Braces: N/A     Occupational Performance:     Bed Mobility:    · Not tested     Functional Mobility/Transfers:  · Patient  completed Sit <> Stand Transfer with stand by assistance  with  rolling walker   · Patient completed Toilet Transfer Step Transfer technique with stand by assistance with  rolling walker  · Functional Mobility: Pt motivated for therapy and tolerated session well. Pt with demonstrated improvement in activity tolerance as she ambulated without a rest break this session. Pt ambulated 200ft SBA using RW to increase activity tolerance required for adls and functional mobility. Pt completed grooming tasks standing at sink. Pt continues to progress toward her goals. Continue Ot POC    Activities of Daily Living:  · Grooming: stand by assistance standing at sink  · Upper Body Dressing: minimum assistance eliza gown  · Toileting: stand by assistance for gown management      [facility] 6 Click ADL:      Treatment & Education:  - OT/POC-  - Importance of mobility to maximize recovery.  - safety w/ functional mobility; hand placement to ensure safe transfers to various surfaces in prep for ADLs  - Reviewed gait sequence and RW management via verbalization and demonstration   - encouraged to ambulate within household environment at least every hour to hour 1/2      Patient left up in chair with all lines intact, call button in reach and RN notifiedEducation:      GOALS:   Multidisciplinary Problems     Occupational Therapy Goals        Problem: Occupational Therapy Goal    Goal Priority Disciplines Outcome Interventions   Occupational Therapy Goal     OT, PT/OT Ongoing, Progressing    Description: Goals to be met by: 10/18/2020    Patient will increase functional independence with ADLs by performing:    LE Dressing with Supervision.  Grooming while standing with Supervision.   Toileting from toilet with Supervision for hygiene and clothing management.   Supine to sit with Supervision.  Toilet transfer to toilet with Supervision.                     Time Tracking:     OT Date of Treatment: 10/10/20  OT Start Time: 1111  OT Stop Time:  1135  OT Total Time (min): 24 min    Billable Minutes:Self Care/Home Management 14  Therapeutic Activity 10    Danielle Funes OT  10/10/2020

## 2020-10-10 NOTE — PROGRESS NOTES
[facility]  Colorectal Surgery  Progress Note    Patient Name: Edita Riley  MRN: [id number]  Admission Date: 9/11/2020  Hospital Length of Stay: 29 days  Attending Physician: Hammad Reynolds MD    Subjective:     Interval History: Patient seen and examined this am. No emesis. States that the nausea medications that she takes are helpful. Denies any pain or fevers at this time. Insurance authorization denied. Continue to work on placement.     Post-Op Info:  Procedure(s) (LRB):  Nephrostogram - antegrade (Bilateral)  REMOVAL, STENT, URETER (Bilateral)   12 Days Post-Op      Medications:  Continuous Infusions:   electrolyte-A 1,000 mL (10/10/20 0100)     Scheduled Meds:   ceFEPime (MAXIPIME) IVPB  1 g Intravenous Q8H    cyanocobalamin  100 mcg Intramuscular Weekly    enoxaparin  40 mg Subcutaneous Q24H    epoetin yecenia-epbx  20,000 Units Subcutaneous Q48H    famotidine (PF)  20 mg Intravenous BID    fluconazole  400 mg Oral Daily    loperamide  2 mg Oral BID    metoprolol tartrate  25 mg Oral BID    metroNIDAZOLE  500 mg Oral Q8H    pantoprazole  40 mg Intravenous Daily    psyllium husk (aspartame)  1 packet Oral Daily    sodium chloride 0.9%  10 mL Intravenous Q6H     PRN Meds:   acetaminophen    acetaminophen    dextrose 50%    glucagon (human recombinant)    HYDROmorphone    insulin aspart U-100    iohexol    iohexol    labetalol    ondansetron    oxyCODONE-acetaminophen    oxyCODONE-acetaminophen    sodium chloride 0.9%    sodium chloride 0.9%    sodium chloride 0.9%        Objective:     Vital Signs (Most Recent):  Temp: 99.1 °F (37.3 °C) (10/10/20 0714)  Pulse: 96 (10/10/20 0714)  Resp: 20 (10/10/20 0111)  BP: 134/69 (10/10/20 0714)  SpO2: 95 % (10/10/20 0714) Vital Signs (24h Range):  Temp:  [98.2 °F (36.8 °C)-99.3 °F (37.4 °C)] 99.1 °F (37.3 °C)  Pulse:  [83-97] 96  Resp:  [20-25] 20  SpO2:  [95 %-99 %] 95 %  BP: (126-143)/(69-91) 134/69      Intake/Output - Last 3 Shifts       10/08 0700 - 10/09 0659 10/09 0700 - 10/10 0659 10/10 0700 - 10/11 0659    P.O.       I.V. (mL/kg)       Other  0     Total Intake(mL/kg)  0 (0)     Urine (mL/kg/hr) 1200 (0.6) 1400 (0.7)     Emesis/NG output 7      Drains 10 5     Other 0 0     Stool 850 675     Total Output 2067 2080     Net -2067 -2080            Urine Occurrence  350 x     Stool Occurrence  100 x           Physical Exam  Vitals signs and nursing note reviewed.   Constitutional:       Appearance: She is well-developed. She is not ill-appearing.   HENT:      Head: Normocephalic.   Eyes:      Pupils: Pupils are equal, round, and reactive to light.   Cardiovascular:      Rate and Rhythm: Normal rate and regular rhythm.      Heart sounds: Normal heart sounds.   Pulmonary:      Effort: Pulmonary effort is normal. No respiratory distress.      Breath sounds: Normal breath sounds. No wheezing or rales.   Abdominal:      Palpations: Abdomen is soft. There is no mass.      Tenderness: There is no guarding or rebound.      Comments: Ileostomy in place to RLQ with gas and stool within ostomy bag.  Urostomy bag in place to LLQ with clear, yellow urine  Midline wound vac remains in place with excellent seal  RLQ and Pelvic IR drains in place with serous output   Musculoskeletal: Normal range of motion.   Skin:     General: Skin is warm and dry.   Neurological:      Mental Status: She is alert and oriented to person, place, and time.   Psychiatric:         Behavior: Behavior normal.         Thought Content: Thought content normal.         Judgment: Judgment normal.       Significant Labs:  CBC (Last 3 Results):   Recent Labs   Lab 10/08/20  0513 10/09/20  0553 10/10/20  0515   WBC 8.32 6.60 6.59   RBC 2.54* 2.53* 2.62*   HGB 6.6* 6.6* 6.8*   HCT 24.2* 23.7* 24.4*    345 341   MCV 95 94 93   MCH 26.0* 26.1* 26.0*   MCHC 27.3* 27.8* 27.9*     CMP (Last 3 Results):   Recent Labs   Lab 10/08/20  0512 10/09/20  0553  10/10/20  0515    101 101   CALCIUM 8.2* 8.3* 8.2*   ALBUMIN 1.8* 1.9* 1.9*   PROT 6.3 6.3 6.2    147* 143   K 2.9* 2.9* 2.9*   CO2 26 27 28    109 107   BUN 11 11 8   CREATININE 0.9 0.9 0.8   ALKPHOS 84 85 73   ALT 5* 5* 6*   AST 9* 10 12   BILITOT 0.2 0.2 0.2       Significant Diagnostics:  I have reviewed all pertinent imaging results/findings within the past 24 hours.    Assessment/Plan:     * Bilateral ureteral obstruction  Ms. Riley is a 55 y.o. female with b/l ureteral obstruction s/p transverse colon conduit on 9/11/2020. Extended R colectomy and ileostomy creation on 9/17.     - patient tolerating diabetic diet well  - nausea prns in place  - famotidine 20mg for reflux  - vital signs stable  - WBC normal; 6.6 today  - continue IV antibiotics per ID's recs  - NGTD from recent IR drain cultures  - 2 IR drains in place with serosanguinous output  - CRP 54 from 64; continue to trend  - mIVFs electrolyte-A @100mL/hr, per nephro recs; patient to follow-up with nephro in 4 wks  - K replaced this am  - ileostomy and urostomy in place with appropriate output  - wound care following for vac changes twice weekly  - continue to monitor I/Os  - lovenox for DVT ppx  - OOB, activity as tolerated  - PT/OT  - Discharge planning with         Fungemia  Blood cult poss for yeast    -consult opth, appreciate input  -d/c PICC  -blood culture after PICC removed, NGTF  -ITA per ID, cefepine, flgayl and diflucan  -monitor culture results   -line holiday    Wound infection after surgery  Wound now open, local wound care, wound vac  ITA per ID    Hypokalemia  Patient has hypokalemia which is currently uncontrolled. Last electrolytes reviewed-   Recent Labs   Lab 09/29/20  0355 09/30/20  0424   K 5.3* 4.4   . Will replace potassium and monitor electrolytes closely.     Acute blood loss anemia  Chronic anemia due to chronic dx and acute blood loss anemia r/t surgery  Monitor labs  No blood due to religiouos  belief  Iron IV     Peritonitis  The patient is a 55-year-old female who is now 1 week out from a urinary diversion by Dr Balbuena due to bladder outlet obstruction from prior pelvic radiation therapy.  At time of that procedure, I procured a vascularized segment of transverse colon to be used as the urinary conduit, as requested by Urology.  A primary end-to-end, hand-sewn colocolonic anastomosis was performed at that time to restore intestinal continuity once the segment of colon to be used for the conduit had been excluded.  The patient did well for the 1st few days postoperatively but then began to develop abdominal pain and distention consistent with an ileus.  This morning, there was small amount of pneumoperitoneum seen on a plain abdominal film, and later today she began to drain feculent appearing fluid through her Kurtis-Arias drain.  She had a normal white blood cell count and did not have peritonitis on examination, so a CT scan was performed, which demonstrated a large volume of free abdominal fluid as well as a large volume of pneumoperitoneum, concerning for an anastomotic leak.  She is being brought back to the operating room for urgent exploratory laparotomy.    OR 9/17/2020    Consult wound care for ileosotmy  Naseem vaughan, scd  Enc amb and IS  casandra smal amt of diet  Cont ITA per ID recommendations    Moderate malnutrition  Consult dietary  TPN dced  Monitor labs  Appreciate dietary inp;ut    ODESSA (acute kidney injury)  Monitor I&O  Appreciate neph input  Change IVF to d5w with 3 amps of NaBicarb due to renal acidosis  Renal dose meds    Severe episode of recurrent major depressive disorder, with psychotic features  Cont home m eds    Impaired functional mobility, balance, gait, and endurance  PT/OT    Essential hypertension  Chronic, controlled.  Latest blood pressure and vitals reviewed-   Temp:  [98 °F (36.7 °C)-99.8 °F (37.7 °C)]   Pulse:  []   Resp:  [18-25]   BP: (112-126)/(58-76)   SpO2:  [97  %-100 %] .   Home meds for hypertension were reviewed and noted below. Hospital anti-hypertensive changes were made as shown below.  Hospital Medications             labetalol 20 mg/4 mL (5 mg/mL) IV syring 10 mg, Intravenous, Every 4 hours PRN, 1. Confirm patient on continuous cardiac monitor (obtain order if needed)<BR>2. Obstetrics is exempt from continuous cardiac monitoring. <BR>3. Monitor ECG rhythm throughout administrations noting rhythm and changes<BR>4. Monitor BP and HR pre-administration, post-administration, and every 30 minutes x1 after dose given<BR>5. Administer Labetalol at rate no faster than 10mg over 1 minute.<BR>6. Contraindications: HR &lt;50, SBP&lt;100, second or third degree AV block. If assessed, hold dose and call provider.        Will utilize p.r.n. blood pressure medication only if patient's blood pressure greater than  180/110 and she develops symptoms such as worsening chest pain or shortness of breath.          Karina Beverly MD  Colorectal Surgery  [facility]

## 2020-10-11 LAB
ALBUMIN SERPL BCP-MCNC: 2 G/DL (ref 3.5–5.2)
ALP SERPL-CCNC: 70 U/L (ref 55–135)
ALT SERPL W/O P-5'-P-CCNC: <5 U/L (ref 10–44)
ANION GAP SERPL CALC-SCNC: 9 MMOL/L (ref 8–16)
AST SERPL-CCNC: 11 U/L (ref 10–40)
BACTERIA BLD CULT: NORMAL
BACTERIA BLD CULT: NORMAL
BASOPHILS # BLD AUTO: 0.04 K/UL (ref 0–0.2)
BASOPHILS NFR BLD: 0.6 % (ref 0–1.9)
BILIRUB SERPL-MCNC: 0.3 MG/DL (ref 0.1–1)
BUN SERPL-MCNC: 6 MG/DL (ref 6–20)
CALCIUM SERPL-MCNC: 8.1 MG/DL (ref 8.7–10.5)
CHLORIDE SERPL-SCNC: 105 MMOL/L (ref 95–110)
CO2 SERPL-SCNC: 28 MMOL/L (ref 23–29)
CREAT SERPL-MCNC: 0.8 MG/DL (ref 0.5–1.4)
CRP SERPL-MCNC: 61.8 MG/L (ref 0–8.2)
DIFFERENTIAL METHOD: ABNORMAL
EOSINOPHIL # BLD AUTO: 0.2 K/UL (ref 0–0.5)
EOSINOPHIL NFR BLD: 2.8 % (ref 0–8)
ERYTHROCYTE [DISTWIDTH] IN BLOOD BY AUTOMATED COUNT: 19.5 % (ref 11.5–14.5)
EST. GFR  (AFRICAN AMERICAN): >60 ML/MIN/1.73 M^2
EST. GFR  (NON AFRICAN AMERICAN): >60 ML/MIN/1.73 M^2
GLUCOSE SERPL-MCNC: 100 MG/DL (ref 70–110)
HCT VFR BLD AUTO: 26.5 % (ref 37–48.5)
HGB BLD-MCNC: 7.6 G/DL (ref 12–16)
IMM GRANULOCYTES # BLD AUTO: 0.04 K/UL (ref 0–0.04)
IMM GRANULOCYTES NFR BLD AUTO: 0.6 % (ref 0–0.5)
LYMPHOCYTES # BLD AUTO: 1 K/UL (ref 1–4.8)
LYMPHOCYTES NFR BLD: 15 % (ref 18–48)
MAGNESIUM SERPL-MCNC: 1.4 MG/DL (ref 1.6–2.6)
MCH RBC QN AUTO: 26.6 PG (ref 27–31)
MCHC RBC AUTO-ENTMCNC: 28.7 G/DL (ref 32–36)
MCV RBC AUTO: 93 FL (ref 82–98)
MONOCYTES # BLD AUTO: 0.8 K/UL (ref 0.3–1)
MONOCYTES NFR BLD: 12.2 % (ref 4–15)
NEUTROPHILS # BLD AUTO: 4.6 K/UL (ref 1.8–7.7)
NEUTROPHILS NFR BLD: 68.8 % (ref 38–73)
NRBC BLD-RTO: 1 /100 WBC
PHOSPHATE SERPL-MCNC: 2.8 MG/DL (ref 2.7–4.5)
PLATELET # BLD AUTO: 348 K/UL (ref 150–350)
PMV BLD AUTO: 9.8 FL (ref 9.2–12.9)
POCT GLUCOSE: 102 MG/DL (ref 70–110)
POCT GLUCOSE: 105 MG/DL (ref 70–110)
POCT GLUCOSE: 107 MG/DL (ref 70–110)
POTASSIUM SERPL-SCNC: 3 MMOL/L (ref 3.5–5.1)
PROT SERPL-MCNC: 6.3 G/DL (ref 6–8.4)
RBC # BLD AUTO: 2.86 M/UL (ref 4–5.4)
SODIUM SERPL-SCNC: 142 MMOL/L (ref 136–145)
WBC # BLD AUTO: 6.73 K/UL (ref 3.9–12.7)

## 2020-10-11 PROCEDURE — 25000003 PHARM REV CODE 250: Performed by: STUDENT IN AN ORGANIZED HEALTH CARE EDUCATION/TRAINING PROGRAM

## 2020-10-11 PROCEDURE — A4216 STERILE WATER/SALINE, 10 ML: HCPCS | Performed by: STUDENT IN AN ORGANIZED HEALTH CARE EDUCATION/TRAINING PROGRAM

## 2020-10-11 PROCEDURE — 83735 ASSAY OF MAGNESIUM: CPT

## 2020-10-11 PROCEDURE — 25000003 PHARM REV CODE 250: Performed by: NURSE PRACTITIONER

## 2020-10-11 PROCEDURE — 11000001 HC ACUTE MED/SURG PRIVATE ROOM

## 2020-10-11 PROCEDURE — 25000003 PHARM REV CODE 250: Performed by: COLON & RECTAL SURGERY

## 2020-10-11 PROCEDURE — 85025 COMPLETE CBC W/AUTO DIFF WBC: CPT

## 2020-10-11 PROCEDURE — 63600175 PHARM REV CODE 636 W HCPCS: Performed by: STUDENT IN AN ORGANIZED HEALTH CARE EDUCATION/TRAINING PROGRAM

## 2020-10-11 PROCEDURE — 84100 ASSAY OF PHOSPHORUS: CPT

## 2020-10-11 PROCEDURE — 80053 COMPREHEN METABOLIC PANEL: CPT

## 2020-10-11 PROCEDURE — 86140 C-REACTIVE PROTEIN: CPT

## 2020-10-11 PROCEDURE — 25000003 PHARM REV CODE 250: Performed by: INTERNAL MEDICINE

## 2020-10-11 PROCEDURE — 36415 COLL VENOUS BLD VENIPUNCTURE: CPT

## 2020-10-11 RX ORDER — PANTOPRAZOLE SODIUM 40 MG/1
40 TABLET, DELAYED RELEASE ORAL DAILY
Status: DISCONTINUED | OUTPATIENT
Start: 2020-10-11 | End: 2020-10-13 | Stop reason: HOSPADM

## 2020-10-11 RX ADMIN — METOPROLOL TARTRATE 25 MG: 25 TABLET, FILM COATED ORAL at 09:10

## 2020-10-11 RX ADMIN — CEFEPIME 1 G: 1 INJECTION, POWDER, FOR SOLUTION INTRAMUSCULAR; INTRAVENOUS at 12:10

## 2020-10-11 RX ADMIN — EPOETIN ALFA-EPBX 20000 UNITS: 10000 INJECTION, SOLUTION INTRAVENOUS; SUBCUTANEOUS at 04:10

## 2020-10-11 RX ADMIN — Medication 10 ML: at 09:10

## 2020-10-11 RX ADMIN — CEFEPIME 1 G: 1 INJECTION, POWDER, FOR SOLUTION INTRAMUSCULAR; INTRAVENOUS at 09:10

## 2020-10-11 RX ADMIN — METRONIDAZOLE 500 MG: 500 TABLET ORAL at 09:10

## 2020-10-11 RX ADMIN — SODIUM CHLORIDE, SODIUM GLUCONATE, SODIUM ACETATE, POTASSIUM CHLORIDE AND MAGNESIUM CHLORIDE 1000 ML: 526; 502; 368; 37; 30 INJECTION, SOLUTION INTRAVENOUS at 06:10

## 2020-10-11 RX ADMIN — METRONIDAZOLE 500 MG: 500 TABLET ORAL at 06:10

## 2020-10-11 RX ADMIN — FLUCONAZOLE 400 MG: 200 TABLET ORAL at 09:10

## 2020-10-11 RX ADMIN — Medication 10 ML: at 05:10

## 2020-10-11 RX ADMIN — CEFEPIME 1 G: 1 INJECTION, POWDER, FOR SOLUTION INTRAMUSCULAR; INTRAVENOUS at 04:10

## 2020-10-11 RX ADMIN — ENOXAPARIN SODIUM 40 MG: 100 INJECTION SUBCUTANEOUS at 04:10

## 2020-10-11 RX ADMIN — SODIUM CHLORIDE, SODIUM GLUCONATE, SODIUM ACETATE, POTASSIUM CHLORIDE AND MAGNESIUM CHLORIDE 1000 ML: 526; 502; 368; 37; 30 INJECTION, SOLUTION INTRAVENOUS at 04:10

## 2020-10-11 RX ADMIN — METRONIDAZOLE 500 MG: 500 TABLET ORAL at 01:10

## 2020-10-11 RX ADMIN — Medication 10 ML: at 12:10

## 2020-10-11 RX ADMIN — Medication 10 ML: at 01:10

## 2020-10-11 RX ADMIN — PANTOPRAZOLE SODIUM 40 MG: 40 TABLET, DELAYED RELEASE ORAL at 09:10

## 2020-10-11 NOTE — ASSESSMENT & PLAN NOTE
Ms. Riley is a 55 y.o. female with b/l ureteral obstruction s/p transverse colon conduit on 9/11/2020. Extended R colectomy and ileostomy creation on 9/17.     - patient tolerating diabetic diet well  - nausea prns in place  - vital signs stable  - continue IV antibiotics per ID's recs  - NGTD from recent IR drain cultures  - 2 IR drains in place with serosanguinous output  - CRP 61 from 54; continue to trend  - mIVFs electrolyte-A @100mL/hr, per nephro recs; patient to follow-up with nephro in 4 wks  - K replaced this am  - ileostomy and urostomy in place with appropriate output  - wound care following for vac changes twice weekly  - continue to monitor I/Os  - lovenox for DVT ppx  - OOB, activity as tolerated  - PT/OT  - Discharge planning with EDWARDO

## 2020-10-11 NOTE — PROGRESS NOTES
[facility]  Colorectal Surgery  Progress Note    Patient Name: Edita Riley  MRN: [id number]  Admission Date: 9/11/2020  Hospital Length of Stay: 30 days  Attending Physician: Hammad Reynolds MD    Subjective:     Interval History: Patient seen and examined this am. NAEON. No emesis. States that the nausea medications that she takes are helpful. Denies any pain or fevers at this time. Continue to work on placement.     Post-Op Info:  Procedure(s) (LRB):  Nephrostogram - antegrade (Bilateral)  REMOVAL, STENT, URETER (Bilateral)   13 Days Post-Op      Medications:  Continuous Infusions:   electrolyte-A 1,000 mL (10/11/20 0650)     Scheduled Meds:   ceFEPime (MAXIPIME) IVPB  1 g Intravenous Q8H    cyanocobalamin  100 mcg Intramuscular Weekly    enoxaparin  40 mg Subcutaneous Q24H    epoetin yecenia-epbx  20,000 Units Subcutaneous Q48H    fluconazole  400 mg Oral Daily    loperamide  2 mg Oral BID    metoprolol tartrate  25 mg Oral BID    metroNIDAZOLE  500 mg Oral Q8H    pantoprazole  40 mg Intravenous Daily    psyllium husk (aspartame)  1 packet Oral Daily    sodium chloride 0.9%  10 mL Intravenous Q6H     PRN Meds:   acetaminophen    acetaminophen    dextrose 50%    glucagon (human recombinant)    HYDROmorphone    insulin aspart U-100    iohexol    iohexol    labetalol    ondansetron    oxyCODONE-acetaminophen    oxyCODONE-acetaminophen    sodium chloride 0.9%    sodium chloride 0.9%    sodium chloride 0.9%        Objective:     Vital Signs (Most Recent):  Temp: 98.5 °F (36.9 °C) (10/11/20 0730)  Pulse: 85 (10/11/20 0730)  Resp: (!) 25 (10/10/20 2100)  BP: 135/66 (10/11/20 0730)  SpO2: 96 % (10/11/20 0730) Vital Signs (24h Range):  Temp:  [98.4 °F (36.9 °C)-99.9 °F (37.7 °C)] 98.5 °F (36.9 °C)  Pulse:  [85-94] 85  Resp:  [23-25] 25  SpO2:  [96 %-98 %] 96 %  BP: (127-137)/(66-82) 135/66     Intake/Output - Last 3 Shifts       10/09 0700 - 10/10 0659 10/10 0700 -  10/11 0659 10/11 0700 - 10/12 0659    Other 0 0 0    Total Intake(mL/kg) 0 (0) 0 (0) 0 (0)    Urine (mL/kg/hr) 1400 (0.7) 1550 (0.7) 300 (3.4)    Emesis/NG output       Drains 5 0 0    Other 0 0 0    Stool 675 700     Total Output 2080 2250 300    Net -2080 -2250 -300           Urine Occurrence 350 x      Stool Occurrence 100 x            Physical Exam  Vitals signs and nursing note reviewed.   Constitutional:       Appearance: She is well-developed. She is not ill-appearing.   HENT:      Head: Normocephalic.   Eyes:      Pupils: Pupils are equal, round, and reactive to light.   Cardiovascular:      Rate and Rhythm: Normal rate and regular rhythm.      Heart sounds: Normal heart sounds.   Pulmonary:      Effort: Pulmonary effort is normal. No respiratory distress.      Breath sounds: Normal breath sounds. No wheezing or rales.   Abdominal:      Palpations: Abdomen is soft. There is no mass.      Tenderness: There is no guarding or rebound.      Comments: Ileostomy in place to RLQ with gas and stool within ostomy bag.  Urostomy bag in place to LLQ with clear, yellow urine  Midline wound vac remains in place with excellent seal  RLQ and Pelvic IR drains in place with serous output   Musculoskeletal: Normal range of motion.   Skin:     General: Skin is warm and dry.   Neurological:      Mental Status: She is alert and oriented to person, place, and time.   Psychiatric:         Behavior: Behavior normal.         Thought Content: Thought content normal.         Judgment: Judgment normal.       Significant Labs:  CBC (Last 3 Results):   Recent Labs   Lab 10/08/20  0513 10/09/20  0553 10/10/20  0515   WBC 8.32 6.60 6.59   RBC 2.54* 2.53* 2.62*   HGB 6.6* 6.6* 6.8*   HCT 24.2* 23.7* 24.4*    345 341   MCV 95 94 93   MCH 26.0* 26.1* 26.0*   MCHC 27.3* 27.8* 27.9*     CMP (Last 3 Results):   Recent Labs   Lab 10/09/20  0553 10/10/20  0515 10/11/20  0639    101 100   CALCIUM 8.3* 8.2* 8.1*   ALBUMIN 1.9* 1.9*  2.0*   PROT 6.3 6.2 6.3   * 143 142   K 2.9* 2.9* 3.0*   CO2 27 28 28    107 105   BUN 11 8 6   CREATININE 0.9 0.8 0.8   ALKPHOS 85 73 70   ALT 5* 6* <5*   AST 10 12 11   BILITOT 0.2 0.2 0.3       Significant Diagnostics:  I have reviewed all pertinent imaging results/findings within the past 24 hours.    Assessment/Plan:     * Bilateral ureteral obstruction  Ms. Riley is a 55 y.o. female with b/l ureteral obstruction s/p transverse colon conduit on 9/11/2020. Extended R colectomy and ileostomy creation on 9/17.     - patient tolerating diabetic diet well  - nausea prns in place  - vital signs stable  - continue IV antibiotics per ID's recs  - NGTD from recent IR drain cultures  - 2 IR drains in place with serosanguinous output  - CRP 61 from 54; continue to trend  - mIVFs electrolyte-A @100mL/hr, per nephro recs; patient to follow-up with nephro in 4 wks  - K replaced this am  - ileostomy and urostomy in place with appropriate output  - wound care following for vac changes twice weekly  - continue to monitor I/Os  - lovenox for DVT ppx  - OOB, activity as tolerated  - PT/OT  - Discharge planning with         Fungemia  Blood cult poss for yeast    -consult opth, appreciate input  -d/c PICC  -blood culture after PICC removed, NGTF  -ITA per ID, cefepine, flgayl and diflucan  -monitor culture results   -line holiday    Wound infection after surgery  Wound now open, local wound care, wound vac  ITA per ID    Hypokalemia  Patient has hypokalemia which is currently uncontrolled. Last electrolytes reviewed-   Recent Labs   Lab 09/29/20  0355 09/30/20  0424   K 5.3* 4.4   . Will replace potassium and monitor electrolytes closely.     Acute blood loss anemia  Chronic anemia due to chronic dx and acute blood loss anemia r/t surgery  Monitor labs  No blood due to religiouos belief  Iron IV     Peritonitis  The patient is a 55-year-old female who is now 1 week out from a urinary diversion by Dr Balbuena due  to bladder outlet obstruction from prior pelvic radiation therapy.  At time of that procedure, I procured a vascularized segment of transverse colon to be used as the urinary conduit, as requested by Urology.  A primary end-to-end, hand-sewn colocolonic anastomosis was performed at that time to restore intestinal continuity once the segment of colon to be used for the conduit had been excluded.  The patient did well for the 1st few days postoperatively but then began to develop abdominal pain and distention consistent with an ileus.  This morning, there was small amount of pneumoperitoneum seen on a plain abdominal film, and later today she began to drain feculent appearing fluid through her Kurtis-Arias drain.  She had a normal white blood cell count and did not have peritonitis on examination, so a CT scan was performed, which demonstrated a large volume of free abdominal fluid as well as a large volume of pneumoperitoneum, concerning for an anastomotic leak.  She is being brought back to the operating room for urgent exploratory laparotomy.    OR 9/17/2020    Consult wound care for ileosotmy  Naseem vaughan, scd  Enc amb and IS  casandra smal amt of diet  Cont ITA per ID recommendations    Moderate malnutrition  Consult dietary  TPN dced  Monitor labs  Appreciate dietary inp;ut    ODESSA (acute kidney injury)  Monitor I&O  Appreciate neph input  Change IVF to d5w with 3 amps of NaBicarb due to renal acidosis  Renal dose meds    Severe episode of recurrent major depressive disorder, with psychotic features  Cont home m eds    Impaired functional mobility, balance, gait, and endurance  PT/OT    Essential hypertension  Chronic, controlled.  Latest blood pressure and vitals reviewed-   Temp:  [98 °F (36.7 °C)-99.8 °F (37.7 °C)]   Pulse:  []   Resp:  [18-25]   BP: (112-126)/(58-76)   SpO2:  [97 %-100 %] .   Home meds for hypertension were reviewed and noted below. Hospital anti-hypertensive changes were made as shown  below.  Hospital Medications             labetalol 20 mg/4 mL (5 mg/mL) IV syring 10 mg, Intravenous, Every 4 hours PRN, 1. Confirm patient on continuous cardiac monitor (obtain order if needed)<BR>2. Obstetrics is exempt from continuous cardiac monitoring. <BR>3. Monitor ECG rhythm throughout administrations noting rhythm and changes<BR>4. Monitor BP and HR pre-administration, post-administration, and every 30 minutes x1 after dose given<BR>5. Administer Labetalol at rate no faster than 10mg over 1 minute.<BR>6. Contraindications: HR &lt;50, SBP&lt;100, second or third degree AV block. If assessed, hold dose and call provider.        Will utilize p.r.n. blood pressure medication only if patient's blood pressure greater than  180/110 and she develops symptoms such as worsening chest pain or shortness of breath.          Karina Beverly MD  Colorectal Surgery  [facility]

## 2020-10-11 NOTE — SUBJECTIVE & OBJECTIVE
Subjective:     Interval History: Patient seen and examined this am. BRITEON. No emesis. States that the nausea medications that she takes are helpful. Denies any pain or fevers at this time. Continue to work on placement.     Post-Op Info:  Procedure(s) (LRB):  Nephrostogram - antegrade (Bilateral)  REMOVAL, STENT, URETER (Bilateral)   13 Days Post-Op      Medications:  Continuous Infusions:   electrolyte-A 1,000 mL (10/11/20 0650)     Scheduled Meds:   ceFEPime (MAXIPIME) IVPB  1 g Intravenous Q8H    cyanocobalamin  100 mcg Intramuscular Weekly    enoxaparin  40 mg Subcutaneous Q24H    epoetin yecenia-epbx  20,000 Units Subcutaneous Q48H    fluconazole  400 mg Oral Daily    loperamide  2 mg Oral BID    metoprolol tartrate  25 mg Oral BID    metroNIDAZOLE  500 mg Oral Q8H    pantoprazole  40 mg Intravenous Daily    psyllium husk (aspartame)  1 packet Oral Daily    sodium chloride 0.9%  10 mL Intravenous Q6H     PRN Meds:   acetaminophen    acetaminophen    dextrose 50%    glucagon (human recombinant)    HYDROmorphone    insulin aspart U-100    iohexol    iohexol    labetalol    ondansetron    oxyCODONE-acetaminophen    oxyCODONE-acetaminophen    sodium chloride 0.9%    sodium chloride 0.9%    sodium chloride 0.9%        Objective:     Vital Signs (Most Recent):  Temp: 98.5 °F (36.9 °C) (10/11/20 0730)  Pulse: 85 (10/11/20 0730)  Resp: (!) 25 (10/10/20 2100)  BP: 135/66 (10/11/20 0730)  SpO2: 96 % (10/11/20 0730) Vital Signs (24h Range):  Temp:  [98.4 °F (36.9 °C)-99.9 °F (37.7 °C)] 98.5 °F (36.9 °C)  Pulse:  [85-94] 85  Resp:  [23-25] 25  SpO2:  [96 %-98 %] 96 %  BP: (127-137)/(66-82) 135/66     Intake/Output - Last 3 Shifts       10/09 0700 - 10/10 0659 10/10 0700 - 10/11 0659 10/11 0700 - 10/12 0659    Other 0 0 0    Total Intake(mL/kg) 0 (0) 0 (0) 0 (0)    Urine (mL/kg/hr) 1400 (0.7) 1550 (0.7) 300 (3.4)    Emesis/NG output       Drains 5 0 0    Other 0 0 0    Stool 675 700     Total  Output 2080 2250 300    Net -2080 -2250 -300           Urine Occurrence 350 x      Stool Occurrence 100 x            Physical Exam  Vitals signs and nursing note reviewed.   Constitutional:       Appearance: She is well-developed. She is not ill-appearing.   HENT:      Head: Normocephalic.   Eyes:      Pupils: Pupils are equal, round, and reactive to light.   Cardiovascular:      Rate and Rhythm: Normal rate and regular rhythm.      Heart sounds: Normal heart sounds.   Pulmonary:      Effort: Pulmonary effort is normal. No respiratory distress.      Breath sounds: Normal breath sounds. No wheezing or rales.   Abdominal:      Palpations: Abdomen is soft. There is no mass.      Tenderness: There is no guarding or rebound.      Comments: Ileostomy in place to RLQ with gas and stool within ostomy bag.  Urostomy bag in place to LLQ with clear, yellow urine  Midline wound vac remains in place with excellent seal  RLQ and Pelvic IR drains in place with serous output   Musculoskeletal: Normal range of motion.   Skin:     General: Skin is warm and dry.   Neurological:      Mental Status: She is alert and oriented to person, place, and time.   Psychiatric:         Behavior: Behavior normal.         Thought Content: Thought content normal.         Judgment: Judgment normal.       Significant Labs:  CBC (Last 3 Results):   Recent Labs   Lab 10/08/20  0513 10/09/20  0553 10/10/20  0515   WBC 8.32 6.60 6.59   RBC 2.54* 2.53* 2.62*   HGB 6.6* 6.6* 6.8*   HCT 24.2* 23.7* 24.4*    345 341   MCV 95 94 93   MCH 26.0* 26.1* 26.0*   MCHC 27.3* 27.8* 27.9*     CMP (Last 3 Results):   Recent Labs   Lab 10/09/20  0553 10/10/20  0515 10/11/20  0639    101 100   CALCIUM 8.3* 8.2* 8.1*   ALBUMIN 1.9* 1.9* 2.0*   PROT 6.3 6.2 6.3   * 143 142   K 2.9* 2.9* 3.0*   CO2 27 28 28    107 105   BUN 11 8 6   CREATININE 0.9 0.8 0.8   ALKPHOS 85 73 70   ALT 5* 6* <5*   AST 10 12 11   BILITOT 0.2 0.2 0.3       Significant  Diagnostics:  I have reviewed all pertinent imaging results/findings within the past 24 hours.

## 2020-10-11 NOTE — SUBJECTIVE & OBJECTIVE
Interval History:   Patient feels well this morning. Tolerating diet.   IR drains slowing down in output.   UOP good per urostomy.       Objective:     Temp:  [98.4 °F (36.9 °C)-99.9 °F (37.7 °C)] 98.5 °F (36.9 °C)  Pulse:  [85-94] 85  Resp:  [23-25] 25  SpO2:  [96 %-98 %] 96 %  BP: (127-137)/(66-82) 135/66     Body mass index is 28.94 kg/m².    Date 10/11/20 0700 - 10/12/20 0659   Shift 6947-7526 9492-6897 7022-7701 24 Hour Total   INTAKE   Other 0   0   Shift Total(mL/kg) 0(0)   0(0)   OUTPUT   Urine(mL/kg/hr) 300   300   Drains 0   0   Other 0   0   Shift Total(mL/kg) 300(3.4)   300(3.4)   Weight (kg) 88.9 88.9 88.9 88.9          Drains     Drain                 Urostomy 09/11/20 ileal conduit LLQ 27 days         Ileostomy 09/18/20 RLQ 20 days         Closed/Suction Drain 10/05/20 1038 Right;Superior Abdomen Bulb 10 Fr. 2 days         Closed/Suction Drain 10/05/20 1039 Right;Inferior Bulb 10 Fr. 2 days                Physical Exam   Constitutional: No distress.   HENT:   Head: Normocephalic and atraumatic.   Eyes: Conjunctivae are normal. No scleral icterus.   Neck: Normal range of motion.   Cardiovascular: Normal rate.    Pulmonary/Chest: Effort normal. No respiratory distress.   Abdominal: Soft. She exhibits no distension. There is no abdominal tenderness. There is no rebound and no guarding.   Urostomy p/p/p draining clear yellow urine  Ileostomy output thickened   Midline incision with wound vac in place      Musculoskeletal: Normal range of motion.      Comments: SCDs in place   Neurological: She is alert.   Skin: Skin is warm and dry. She is not diaphoretic.         Significant Labs:    BMP:  Recent Labs   Lab 10/09/20  0553 10/10/20  0515 10/11/20  0639   * 143 142   K 2.9* 2.9* 3.0*    107 105   CO2 27 28 28   BUN 11 8 6   CREATININE 0.9 0.8 0.8   CALCIUM 8.3* 8.2* 8.1*       CBC:   Recent Labs   Lab 10/09/20  0553 10/10/20  0515 10/11/20  0639   WBC 6.60 6.59 6.73   HGB 6.6* 6.8* 7.6*   HCT  23.7* 24.4* 26.5*    341 348       Blood Culture:   Recent Labs   Lab 10/06/20  0628 10/06/20  0631   LABBLOO No Growth to date  No Growth to date  No Growth to date  No Growth to date  No Growth to date No Growth to date  No Growth to date  No Growth to date  No Growth to date  No Growth to date     Urine Culture: No results for input(s): LABURIN in the last 168 hours.  Urine Studies: No results for input(s): COLORU, APPEARANCEUA, PHUR, SPECGRAV, PROTEINUA, GLUCUA, KETONESU, BILIRUBINUA, OCCULTUA, NITRITE, UROBILINOGEN, LEUKOCYTESUR, RBCUA, WBCUA, BACTERIA, SQUAMEPITHEL, HYALINECASTS in the last 168 hours.    Invalid input(s): WRIGHTSUR    Significant Imaging:  All pertinent imaging results/findings from the past 24 hours have been reviewed.

## 2020-10-11 NOTE — PROGRESS NOTES
Pharmacist Intervention IV to PO Note    Edita Riley is a 55 y.o. female being treated with IV medication pantoprazole    Patient Data:    Vital Signs (Most Recent):  Temp: 98.5 °F (36.9 °C) (10/11/20 0730)  Pulse: 85 (10/11/20 0730)  Resp: (!) 25 (10/10/20 2100)  BP: 135/66 (10/11/20 0730)  SpO2: 96 % (10/11/20 0730)   Vital Signs (72h Range):  Temp:  [97 °F (36.1 °C)-99.9 °F (37.7 °C)]   Pulse:  []   Resp:  [18-25]   BP: (126-160)/(66-91)   SpO2:  [95 %-99 %]      CBC:  Recent Labs   Lab 10/09/20  0553 10/10/20  0515 10/11/20  0639   WBC 6.60 6.59 6.73   RBC 2.53* 2.62* 2.86*   HGB 6.6* 6.8* 7.6*   HCT 23.7* 24.4* 26.5*    341 348   MCV 94 93 93   MCH 26.1* 26.0* 26.6*   MCHC 27.8* 27.9* 28.7*     CMP:     Recent Labs   Lab 10/09/20  0553 10/10/20  0515 10/11/20  0639    101 100   CALCIUM 8.3* 8.2* 8.1*   ALBUMIN 1.9* 1.9* 2.0*   PROT 6.3 6.2 6.3   * 143 142   K 2.9* 2.9* 3.0*   CO2 27 28 28    107 105   BUN 11 8 6   CREATININE 0.9 0.8 0.8   ALKPHOS 85 73 70   ALT 5* 6* <5*   AST 10 12 11   BILITOT 0.2 0.2 0.3       Dietary Orders:  Diet Orders            Dietary nutrition supplements [facility]; Boost Glucose Control - Any flavor starting at 10/09 0745    Diet diabetic [facility]; 2000 Calorie: Diabetic starting at 10/05 1622            Based on the following criteria, this patient qualifies for intravenous to oral conversion:  [x] The patients gastrointestinal tract is functioning (tolerating medications via oral or enteral route for 24 hours and tolerating food or enteral feeds for 24 hours).  [x] The patient is hemodynamically stable for 24 hours (heart rate <100 beats per minute, systolic blood pressure >99 mm Hg, and respiratory rate <20 breaths per minute).  [x] The patient shows clinical improvement (afebrile for at least 24 hours and white blood cell count downtrending or normalized). Additionally, the patient must be non-neutropenic (absolute  neutrophil count >500 cells/mm3).      IV medication pantoprazole will be changed to oral medication pantoprazole.    Pharmacist's Name: Erin Vasquez  Pharmacist's Extension: [phone]

## 2020-10-11 NOTE — PROGRESS NOTES
[facility]  Urology  Progress Note    Patient Name: Edita Riley  MRN: [id number]  Admission Date: 9/11/2020  Hospital Length of Stay: 30 days  Code Status: Full Code   Attending Provider: Hammad Reynolds MD   Primary Care Physician: Audrey Mustafa MD    Subjective:     HPI:  Edita Riley is a 55 y.o. female with a history of cervical cancer s/p pelvic radiation. She has since developed bilateral ureteral strictures and bilateral hydronephrosis R>L. She had a MAG3 scan confirmed presence of bilateral obstruction. She is s/p open transverse colon conduit urinary diversion on 9/11/2020.     Interval History:   Patient feels well this morning. Tolerating diet.   IR drains slowing down in output.   UOP good per urostomy.       Objective:     Temp:  [98.4 °F (36.9 °C)-99.9 °F (37.7 °C)] 98.5 °F (36.9 °C)  Pulse:  [85-94] 85  Resp:  [23-25] 25  SpO2:  [96 %-98 %] 96 %  BP: (127-137)/(66-82) 135/66     Body mass index is 28.94 kg/m².    Date 10/11/20 0700 - 10/12/20 0659   Shift 3886-3668 8639-2366 9900-9823 24 Hour Total   INTAKE   Other 0   0   Shift Total(mL/kg) 0(0)   0(0)   OUTPUT   Urine(mL/kg/hr) 300   300   Drains 0   0   Other 0   0   Shift Total(mL/kg) 300(3.4)   300(3.4)   Weight (kg) 88.9 88.9 88.9 88.9          Drains     Drain                 Urostomy 09/11/20 ileal conduit LLQ 27 days         Ileostomy 09/18/20 RLQ 20 days         Closed/Suction Drain 10/05/20 1038 Right;Superior Abdomen Bulb 10 Fr. 2 days         Closed/Suction Drain 10/05/20 1039 Right;Inferior Bulb 10 Fr. 2 days                Physical Exam   Constitutional: No distress.   HENT:   Head: Normocephalic and atraumatic.   Eyes: Conjunctivae are normal. No scleral icterus.   Neck: Normal range of motion.   Cardiovascular: Normal rate.    Pulmonary/Chest: Effort normal. No respiratory distress.   Abdominal: Soft. She exhibits no distension. There is no abdominal tenderness. There is no rebound and no  guarding.   Urostomy p/p/p draining clear yellow urine  Ileostomy output thickened   Midline incision with wound vac in place      Musculoskeletal: Normal range of motion.      Comments: SCDs in place   Neurological: She is alert.   Skin: Skin is warm and dry. She is not diaphoretic.         Significant Labs:    BMP:  Recent Labs   Lab 10/09/20  0553 10/10/20  0515 10/11/20  0639   * 143 142   K 2.9* 2.9* 3.0*    107 105   CO2 27 28 28   BUN 11 8 6   CREATININE 0.9 0.8 0.8   CALCIUM 8.3* 8.2* 8.1*       CBC:   Recent Labs   Lab 10/09/20  0553 10/10/20  0515 10/11/20  0639   WBC 6.60 6.59 6.73   HGB 6.6* 6.8* 7.6*   HCT 23.7* 24.4* 26.5*    341 348       Blood Culture:   Recent Labs   Lab 10/06/20  0628 10/06/20  0631   LABBLOO No Growth to date  No Growth to date  No Growth to date  No Growth to date  No Growth to date No Growth to date  No Growth to date  No Growth to date  No Growth to date  No Growth to date     Urine Culture: No results for input(s): LABURIN in the last 168 hours.  Urine Studies: No results for input(s): COLORU, APPEARANCEUA, PHUR, SPECGRAV, PROTEINUA, GLUCUA, KETONESU, BILIRUBINUA, OCCULTUA, NITRITE, UROBILINOGEN, LEUKOCYTESUR, RBCUA, WBCUA, BACTERIA, SQUAMEPITHEL, HYALINECASTS in the last 168 hours.    Invalid input(s): WRIGHTSUR    Significant Imaging:  All pertinent imaging results/findings from the past 24 hours have been reviewed.              Assessment/Plan:     * Bilateral ureteral obstruction  S/p urinary diversion with colon conduit 9/11   S/p emergent ex-lap 9/17, colo-colic anastomosis intact, bowel perforation found, underwent resection of right colon, remaining transverses colon, and proximal descending colon, ileostomy creation, Charlotte's pouch created     - CRS primary  - regular diet per Primary  - nephrostomy tubes removed 10/1 - Cr stable, good UOP from ostomy  - continue PT/OT, OOBTC as tolerated, encourage ambulation  - Wound care ostomy  consulted for assistance with ostomy teaching, wound care  - patient is a Taoism; management per primary and Heme-Onc (on EPO, B12, and iron)  - Abx per ID and primary.    - Drains: maintain IR drains   - Prophylaxis: IS, SCDs, GI ppx        VTE Risk Mitigation (From admission, onward)         Ordered     enoxaparin injection 40 mg  Every 24 hours      10/06/20 1001     IP VTE HIGH RISK PATIENT  Once      09/11/20 2024     Place sequential compression device  Until discontinued      09/11/20 2024                Izzy Vega MD  Urology  [facility]

## 2020-10-11 NOTE — ASSESSMENT & PLAN NOTE
S/p urinary diversion with colon conduit 9/11   S/p emergent ex-lap 9/17, colo-colic anastomosis intact, bowel perforation found, underwent resection of right colon, remaining transverses colon, and proximal descending colon, ileostomy creation, Charlotte's pouch created     - CRS primary  - regular diet per Primary  - nephrostomy tubes removed 10/1 - Cr stable, good UOP from ostomy  - continue PT/OT, OOBTC as tolerated, encourage ambulation  - Wound care ostomy consulted for assistance with ostomy teaching, wound care  - patient is a Sikh; management per primary and Heme-Onc (on EPO, B12, and iron)  - Abx per ID and primary.    - Drains: maintain IR drains   - Prophylaxis: IS, SCDs, GI ppx

## 2020-10-12 LAB
ALBUMIN SERPL BCP-MCNC: 1.9 G/DL (ref 3.5–5.2)
ALP SERPL-CCNC: 69 U/L (ref 55–135)
ALT SERPL W/O P-5'-P-CCNC: <5 U/L (ref 10–44)
ANION GAP SERPL CALC-SCNC: 9 MMOL/L (ref 8–16)
AST SERPL-CCNC: 12 U/L (ref 10–40)
BACTERIA SPEC ANAEROBE CULT: NORMAL
BASOPHILS # BLD AUTO: 0.02 K/UL (ref 0–0.2)
BASOPHILS NFR BLD: 0.3 % (ref 0–1.9)
BILIRUB SERPL-MCNC: 0.3 MG/DL (ref 0.1–1)
BUN SERPL-MCNC: 5 MG/DL (ref 6–20)
CALCIUM SERPL-MCNC: 8.1 MG/DL (ref 8.7–10.5)
CHLORIDE SERPL-SCNC: 102 MMOL/L (ref 95–110)
CO2 SERPL-SCNC: 28 MMOL/L (ref 23–29)
CREAT SERPL-MCNC: 0.8 MG/DL (ref 0.5–1.4)
CRP SERPL-MCNC: 69.2 MG/L (ref 0–8.2)
DIFFERENTIAL METHOD: ABNORMAL
EOSINOPHIL # BLD AUTO: 0.2 K/UL (ref 0–0.5)
EOSINOPHIL NFR BLD: 2.5 % (ref 0–8)
ERYTHROCYTE [DISTWIDTH] IN BLOOD BY AUTOMATED COUNT: 19.7 % (ref 11.5–14.5)
EST. GFR  (AFRICAN AMERICAN): >60 ML/MIN/1.73 M^2
EST. GFR  (NON AFRICAN AMERICAN): >60 ML/MIN/1.73 M^2
GLUCOSE SERPL-MCNC: 99 MG/DL (ref 70–110)
HCT VFR BLD AUTO: 27.3 % (ref 37–48.5)
HGB BLD-MCNC: 7.6 G/DL (ref 12–16)
IMM GRANULOCYTES # BLD AUTO: 0.04 K/UL (ref 0–0.04)
IMM GRANULOCYTES NFR BLD AUTO: 0.6 % (ref 0–0.5)
LYMPHOCYTES # BLD AUTO: 1.1 K/UL (ref 1–4.8)
LYMPHOCYTES NFR BLD: 16.6 % (ref 18–48)
MAGNESIUM SERPL-MCNC: 1.4 MG/DL (ref 1.6–2.6)
MCH RBC QN AUTO: 26 PG (ref 27–31)
MCHC RBC AUTO-ENTMCNC: 27.8 G/DL (ref 32–36)
MCV RBC AUTO: 94 FL (ref 82–98)
MONOCYTES # BLD AUTO: 0.9 K/UL (ref 0.3–1)
MONOCYTES NFR BLD: 13.5 % (ref 4–15)
NEUTROPHILS # BLD AUTO: 4.4 K/UL (ref 1.8–7.7)
NEUTROPHILS NFR BLD: 66.5 % (ref 38–73)
NRBC BLD-RTO: 1 /100 WBC
PHOSPHATE SERPL-MCNC: 2.3 MG/DL (ref 2.7–4.5)
PLATELET # BLD AUTO: 340 K/UL (ref 150–350)
PMV BLD AUTO: 10.1 FL (ref 9.2–12.9)
POCT GLUCOSE: 111 MG/DL (ref 70–110)
POCT GLUCOSE: 116 MG/DL (ref 70–110)
POTASSIUM SERPL-SCNC: 3 MMOL/L (ref 3.5–5.1)
PROT SERPL-MCNC: 6.2 G/DL (ref 6–8.4)
RBC # BLD AUTO: 2.92 M/UL (ref 4–5.4)
SODIUM SERPL-SCNC: 139 MMOL/L (ref 136–145)
WBC # BLD AUTO: 6.67 K/UL (ref 3.9–12.7)

## 2020-10-12 PROCEDURE — 25000003 PHARM REV CODE 250: Performed by: STUDENT IN AN ORGANIZED HEALTH CARE EDUCATION/TRAINING PROGRAM

## 2020-10-12 PROCEDURE — A4216 STERILE WATER/SALINE, 10 ML: HCPCS | Performed by: STUDENT IN AN ORGANIZED HEALTH CARE EDUCATION/TRAINING PROGRAM

## 2020-10-12 PROCEDURE — 25000242 PHARM REV CODE 250 ALT 637 W/ HCPCS: Performed by: NURSE PRACTITIONER

## 2020-10-12 PROCEDURE — 97530 THERAPEUTIC ACTIVITIES: CPT | Mod: CQ

## 2020-10-12 PROCEDURE — 36415 COLL VENOUS BLD VENIPUNCTURE: CPT

## 2020-10-12 PROCEDURE — 11000001 HC ACUTE MED/SURG PRIVATE ROOM

## 2020-10-12 PROCEDURE — 80053 COMPREHEN METABOLIC PANEL: CPT

## 2020-10-12 PROCEDURE — 83735 ASSAY OF MAGNESIUM: CPT

## 2020-10-12 PROCEDURE — 25000003 PHARM REV CODE 250: Performed by: COLON & RECTAL SURGERY

## 2020-10-12 PROCEDURE — 25000003 PHARM REV CODE 250: Performed by: INTERNAL MEDICINE

## 2020-10-12 PROCEDURE — 63600175 PHARM REV CODE 636 W HCPCS: Performed by: STUDENT IN AN ORGANIZED HEALTH CARE EDUCATION/TRAINING PROGRAM

## 2020-10-12 PROCEDURE — 97530 THERAPEUTIC ACTIVITIES: CPT

## 2020-10-12 PROCEDURE — 25000003 PHARM REV CODE 250: Performed by: NURSE PRACTITIONER

## 2020-10-12 PROCEDURE — 85025 COMPLETE CBC W/AUTO DIFF WBC: CPT

## 2020-10-12 PROCEDURE — 97535 SELF CARE MNGMENT TRAINING: CPT

## 2020-10-12 PROCEDURE — 86140 C-REACTIVE PROTEIN: CPT

## 2020-10-12 PROCEDURE — 84100 ASSAY OF PHOSPHORUS: CPT

## 2020-10-12 PROCEDURE — 97116 GAIT TRAINING THERAPY: CPT | Mod: CQ

## 2020-10-12 RX ORDER — POTASSIUM CHLORIDE 1.5 G/1.58G
40 POWDER, FOR SOLUTION ORAL ONCE
Status: COMPLETED | OUTPATIENT
Start: 2020-10-12 | End: 2020-10-12

## 2020-10-12 RX ADMIN — CEFEPIME 1 G: 1 INJECTION, POWDER, FOR SOLUTION INTRAMUSCULAR; INTRAVENOUS at 05:10

## 2020-10-12 RX ADMIN — CEFEPIME 1 G: 1 INJECTION, POWDER, FOR SOLUTION INTRAMUSCULAR; INTRAVENOUS at 12:10

## 2020-10-12 RX ADMIN — METRONIDAZOLE 500 MG: 500 TABLET ORAL at 10:10

## 2020-10-12 RX ADMIN — LOPERAMIDE HYDROCHLORIDE 2 MG: 2 CAPSULE ORAL at 08:10

## 2020-10-12 RX ADMIN — ENOXAPARIN SODIUM 40 MG: 100 INJECTION SUBCUTANEOUS at 05:10

## 2020-10-12 RX ADMIN — Medication 10 ML: at 05:10

## 2020-10-12 RX ADMIN — METOPROLOL TARTRATE 25 MG: 25 TABLET, FILM COATED ORAL at 09:10

## 2020-10-12 RX ADMIN — POTASSIUM CHLORIDE 40 MEQ: 1.5 POWDER, FOR SOLUTION ORAL at 08:10

## 2020-10-12 RX ADMIN — PANTOPRAZOLE SODIUM 40 MG: 40 TABLET, DELAYED RELEASE ORAL at 08:10

## 2020-10-12 RX ADMIN — PSYLLIUM HUSK 1 PACKET: 3.4 POWDER ORAL at 08:10

## 2020-10-12 RX ADMIN — FLUCONAZOLE 400 MG: 200 TABLET ORAL at 09:10

## 2020-10-12 RX ADMIN — METRONIDAZOLE 500 MG: 500 TABLET ORAL at 02:10

## 2020-10-12 RX ADMIN — SODIUM CHLORIDE, SODIUM GLUCONATE, SODIUM ACETATE, POTASSIUM CHLORIDE AND MAGNESIUM CHLORIDE 1000 ML: 526; 502; 368; 37; 30 INJECTION, SOLUTION INTRAVENOUS at 03:10

## 2020-10-12 RX ADMIN — LOPERAMIDE HYDROCHLORIDE 2 MG: 2 CAPSULE ORAL at 09:10

## 2020-10-12 RX ADMIN — METOPROLOL TARTRATE 25 MG: 25 TABLET, FILM COATED ORAL at 08:10

## 2020-10-12 RX ADMIN — Medication 10 ML: at 01:10

## 2020-10-12 RX ADMIN — Medication 10 ML: at 12:10

## 2020-10-12 RX ADMIN — METRONIDAZOLE 500 MG: 500 TABLET ORAL at 05:10

## 2020-10-12 RX ADMIN — CEFEPIME 1 G: 1 INJECTION, POWDER, FOR SOLUTION INTRAMUSCULAR; INTRAVENOUS at 08:10

## 2020-10-12 NOTE — PLAN OF CARE
Problem: Occupational Therapy Goal  Goal: Occupational Therapy Goal  Description: Goals to be met by: 10/18/2020    Patient will increase functional independence with ADLs by performing:    LE Dressing with Supervision.  Grooming while standing with Supervision.   Toileting from toilet with Supervision for hygiene and clothing management.   Supine to sit with Supervision.  Toilet transfer to toilet with Supervision.    Outcome: Ongoing, Progressing     Goals reviewed and remain appropriate, continue POC    PEREZ Quevedo  10/12/2020   Pager #: [phone]

## 2020-10-12 NOTE — PROGRESS NOTES
[facility]  Colorectal Surgery  Progress Note    Patient Name: Edita Riley  MRN: [id number]  Admission Date: 9/11/2020  Hospital Length of Stay: 31 days  Attending Physician: Hammad Reynolds MD    Subjective:     Interval History: Patient seen and examined this am. No acute events overnight. Tolerating diet. Awaiting placement.     Post-Op Info:  Procedure(s) (LRB):  Nephrostogram - antegrade (Bilateral)  REMOVAL, STENT, URETER (Bilateral)   14 Days Post-Op      Medications:  Continuous Infusions:  Scheduled Meds:   ceFEPime (MAXIPIME) IVPB  1 g Intravenous Q8H    cyanocobalamin  100 mcg Intramuscular Weekly    enoxaparin  40 mg Subcutaneous Q24H    epoetin yecenia-epbx  20,000 Units Subcutaneous Q48H    fluconazole  400 mg Oral Daily    loperamide  2 mg Oral BID    metoprolol tartrate  25 mg Oral BID    metroNIDAZOLE  500 mg Oral Q8H    pantoprazole  40 mg Oral Daily    psyllium husk (aspartame)  1 packet Oral Daily    sodium chloride 0.9%  10 mL Intravenous Q6H     PRN Meds:   acetaminophen    acetaminophen    dextrose 50%    glucagon (human recombinant)    HYDROmorphone    insulin aspart U-100    iohexol    iohexol    labetalol    ondansetron    oxyCODONE-acetaminophen    oxyCODONE-acetaminophen    sodium chloride 0.9%    sodium chloride 0.9%    sodium chloride 0.9%        Objective:     Vital Signs (Most Recent):  Temp: 97.1 °F (36.2 °C) (10/12/20 0546)  Pulse: 85 (10/12/20 0546)  Resp: 18 (10/12/20 0546)  BP: (!) 159/91 (10/12/20 0546)  SpO2: 97 % (10/12/20 0546) Vital Signs (24h Range):  Temp:  [97.1 °F (36.2 °C)-99.2 °F (37.3 °C)] 97.1 °F (36.2 °C)  Pulse:  [79-94] 85  Resp:  [16-23] 18  SpO2:  [95 %-98 %] 97 %  BP: (129-159)/(63-91) 159/91     Intake/Output - Last 3 Shifts       10/10 0700 - 10/11 0659 10/11 0700 - 10/12 0659 10/12 0700 - 10/13 0659    Other 0 0     Total Intake(mL/kg) 0 (0) 0 (0)     Urine (mL/kg/hr) 1550 (0.7) 2050 (1)     Drains 0 10      Other 0 0     Stool 700 425     Total Output 2250 2485     Net -2250 -2487                  Physical Exam  Vitals signs and nursing note reviewed.   Constitutional:       General: She is not in acute distress.     Appearance: Normal appearance. She is normal weight. She is not toxic-appearing.   HENT:      Head: Normocephalic and atraumatic.      Right Ear: External ear normal.      Left Ear: External ear normal.      Mouth/Throat:      Mouth: Mucous membranes are moist.   Neck:      Musculoskeletal: Normal range of motion.   Cardiovascular:      Rate and Rhythm: Regular rhythm. Tachycardia present.      Pulses: Normal pulses.   Pulmonary:      Effort: Pulmonary effort is normal. No respiratory distress.   Abdominal:      Comments: Abdomen soft, non-distended; non=tender to palpation  Midline wound vac in place with excellent seal.  RLQ ostomy with brown, liquid output  LLQ urostomy with clear, yellow urine  2 RLQ IR drains in place with minimal serous output   Musculoskeletal: Normal range of motion.   Skin:     General: Skin is warm and dry.   Neurological:      General: No focal deficit present.      Mental Status: She is alert.   Psychiatric:         Mood and Affect: Mood normal.           Significant Labs:  CBC (Last 3 Results):   Recent Labs   Lab 10/10/20  0515 10/11/20  0639 10/12/20  0421   WBC 6.59 6.73 6.67   RBC 2.62* 2.86* 2.92*   HGB 6.8* 7.6* 7.6*   HCT 24.4* 26.5* 27.3*    348 340   MCV 93 93 94   MCH 26.0* 26.6* 26.0*   MCHC 27.9* 28.7* 27.8*     CMP (Last 3 Results):   Recent Labs   Lab 10/10/20  0515 10/11/20  0639 10/12/20  0421    100 99   CALCIUM 8.2* 8.1* 8.1*   ALBUMIN 1.9* 2.0* 1.9*   PROT 6.2 6.3 6.2    142 139   K 2.9* 3.0* 3.0*   CO2 28 28 28    105 102   BUN 8 6 5*   CREATININE 0.8 0.8 0.8   ALKPHOS 73 70 69   ALT 6* <5* <5*   AST 12 11 12   BILITOT 0.2 0.3 0.3       Significant Diagnostics:  I have reviewed all pertinent imaging results/findings within the  past 24 hours.    Assessment/Plan:     * Bilateral ureteral obstruction  Ms. Riley is a 55 y.o. female with b/l ureteral obstruction s/p transverse colon conduit on 9/11/2020. Extended R colectomy and ileostomy creation on 9/17.     - Patient continues to tolerate diet   - denies any bouts of nausea, emesis  - nausea prns for any recurrent episodes  - vital signs stable  - continue IV antibiotics per ID's recs  - WBC normal  - No growth from recent abdominal abscess cultures, blood cultures  - 2 RLQ IR drains in place with minimal serous output  - CRP 69 from 61; continue to trend  - d/c mIVFs  - per nephro recs; patient to follow-up with nephro in 4 wks  - K 3.0 this am; replaced  - ileostomy and urostomy in place with appropriate output  - wound care following for vac changes twice weekly  - continue to monitor I/Os  - lovenox for DVT ppx  - OOB, activity as tolerated  - PT/OT  - Discharge planning with EDWARDO Martinez MD  Colorectal Surgery  [facility]

## 2020-10-12 NOTE — SUBJECTIVE & OBJECTIVE
Subjective:     Interval History: Patient seen and examined this am. No acute events overnight. Tolerating diet. Awaiting placement.     Post-Op Info:  Procedure(s) (LRB):  Nephrostogram - antegrade (Bilateral)  REMOVAL, STENT, URETER (Bilateral)   14 Days Post-Op      Medications:  Continuous Infusions:  Scheduled Meds:   ceFEPime (MAXIPIME) IVPB  1 g Intravenous Q8H    cyanocobalamin  100 mcg Intramuscular Weekly    enoxaparin  40 mg Subcutaneous Q24H    epoetin yecenia-epbx  20,000 Units Subcutaneous Q48H    fluconazole  400 mg Oral Daily    loperamide  2 mg Oral BID    metoprolol tartrate  25 mg Oral BID    metroNIDAZOLE  500 mg Oral Q8H    pantoprazole  40 mg Oral Daily    psyllium husk (aspartame)  1 packet Oral Daily    sodium chloride 0.9%  10 mL Intravenous Q6H     PRN Meds:   acetaminophen    acetaminophen    dextrose 50%    glucagon (human recombinant)    HYDROmorphone    insulin aspart U-100    iohexol    iohexol    labetalol    ondansetron    oxyCODONE-acetaminophen    oxyCODONE-acetaminophen    sodium chloride 0.9%    sodium chloride 0.9%    sodium chloride 0.9%        Objective:     Vital Signs (Most Recent):  Temp: 97.1 °F (36.2 °C) (10/12/20 0546)  Pulse: 85 (10/12/20 0546)  Resp: 18 (10/12/20 0546)  BP: (!) 159/91 (10/12/20 0546)  SpO2: 97 % (10/12/20 0546) Vital Signs (24h Range):  Temp:  [97.1 °F (36.2 °C)-99.2 °F (37.3 °C)] 97.1 °F (36.2 °C)  Pulse:  [79-94] 85  Resp:  [16-23] 18  SpO2:  [95 %-98 %] 97 %  BP: (129-159)/(63-91) 159/91     Intake/Output - Last 3 Shifts       10/10 0700 - 10/11 0659 10/11 0700 - 10/12 0659 10/12 0700 - 10/13 0659    Other 0 0     Total Intake(mL/kg) 0 (0) 0 (0)     Urine (mL/kg/hr) 1550 (0.7) 2050 (1)     Drains 0 10     Other 0 0     Stool 700 425     Total Output 2250 2485     Net -2250 -2483                  Physical Exam  Vitals signs and nursing note reviewed.   Constitutional:       General: She is not in acute distress.      Appearance: Normal appearance. She is normal weight. She is not toxic-appearing.   HENT:      Head: Normocephalic and atraumatic.      Right Ear: External ear normal.      Left Ear: External ear normal.      Mouth/Throat:      Mouth: Mucous membranes are moist.   Neck:      Musculoskeletal: Normal range of motion.   Cardiovascular:      Rate and Rhythm: Regular rhythm. Tachycardia present.      Pulses: Normal pulses.   Pulmonary:      Effort: Pulmonary effort is normal. No respiratory distress.   Abdominal:      Comments: Abdomen soft, non-distended; non=tender to palpation  Midline wound vac in place with excellent seal.  RLQ ostomy with brown, liquid output  LLQ urostomy with clear, yellow urine  2 RLQ IR drains in place with minimal serous output   Musculoskeletal: Normal range of motion.   Skin:     General: Skin is warm and dry.   Neurological:      General: No focal deficit present.      Mental Status: She is alert.   Psychiatric:         Mood and Affect: Mood normal.           Significant Labs:  CBC (Last 3 Results):   Recent Labs   Lab 10/10/20  0515 10/11/20  0639 10/12/20  0421   WBC 6.59 6.73 6.67   RBC 2.62* 2.86* 2.92*   HGB 6.8* 7.6* 7.6*   HCT 24.4* 26.5* 27.3*    348 340   MCV 93 93 94   MCH 26.0* 26.6* 26.0*   MCHC 27.9* 28.7* 27.8*     CMP (Last 3 Results):   Recent Labs   Lab 10/10/20  0515 10/11/20  0639 10/12/20  0421    100 99   CALCIUM 8.2* 8.1* 8.1*   ALBUMIN 1.9* 2.0* 1.9*   PROT 6.2 6.3 6.2    142 139   K 2.9* 3.0* 3.0*   CO2 28 28 28    105 102   BUN 8 6 5*   CREATININE 0.8 0.8 0.8   ALKPHOS 73 70 69   ALT 6* <5* <5*   AST 12 11 12   BILITOT 0.2 0.3 0.3       Significant Diagnostics:  I have reviewed all pertinent imaging results/findings within the past 24 hours.

## 2020-10-12 NOTE — ASSESSMENT & PLAN NOTE
S/p urinary diversion with colon conduit 9/11   S/p emergent ex-lap 9/17, colo-colic anastomosis intact, bowel perforation found, underwent resection of right colon, remaining transverses colon, and proximal descending colon, ileostomy creation, Charlotte's pouch created     - CRS primary  - regular diet per Primary  - nephrostomy tubes removed 10/1 - Cr stable, good UOP from ostomy  - continue PT/OT, OOBTC as tolerated, encourage ambulation  - Wound care ostomy consulted for assistance with ostomy teaching, wound care  - patient is a Taoist; management per primary and Heme-Onc (on EPO, B12, and iron)  - Abx per ID and primary.    - Drains: maintain IR drains   - Prophylaxis: IS, SCDs, GI ppx

## 2020-10-12 NOTE — ASSESSMENT & PLAN NOTE
Ms. Riley is a 55 y.o. female with b/l ureteral obstruction s/p transverse colon conduit on 9/11/2020. Extended R colectomy and ileostomy creation on 9/17.     - Patient continues to tolerate diet   - denies any bouts of nausea, emesis  - nausea prns for any recurrent episodes  - vital signs stable  - continue IV antibiotics per ID's recs  - WBC normal  - No growth from recent abdominal abscess cultures, blood cultures  - 2 RLQ IR drains in place with minimal serous output  - CRP 69 from 61; continue to trend  - d/c mIVFs  - per nephro recs; patient to follow-up with nephro in 4 wks  - K 3.0 this am; replaced  - ileostomy and urostomy in place with appropriate output  - wound care following for vac changes twice weekly  - continue to monitor I/Os  - lovenox for DVT ppx  - OOB, activity as tolerated  - PT/OT  - Discharge planning with EDWARDO

## 2020-10-12 NOTE — PLAN OF CARE
Problem: Physical Therapy Goal  Goal: Physical Therapy Goal  Description: Patient re-evaluated 20 s/p further surgery.  Goals to be met by: 10/14/2020    Patient will increase functional independence with mobility by performin. Supine to sit with Set-up [place]  2. Sit to supine with Set-up [place]  3. Sit to stand transfer with Supervision-met  4. Bed to chair transfer with Supervision using Rolling Walker-met  5. Gait  x 250 feet with Stand-by Assistance using Rolling Walker.   6. Ascend/descend 3 stair with left Handrails Stand-by Assistance  Outcome: Ongoing, Not Progressing

## 2020-10-12 NOTE — PROGRESS NOTES
[facility]  Urology  Progress Note    Patient Name: Edita Riley  MRN: [id number]  Admission Date: 9/11/2020  Hospital Length of Stay: 31 days  Code Status: Full Code   Attending Provider: Hammad Reynolds MD   Primary Care Physician: Audrey Mustafa MD    Subjective:     HPI:  Edita Riley is a 55 y.o. female with a history of cervical cancer s/p pelvic radiation. She has since developed bilateral ureteral strictures and bilateral hydronephrosis R>L. She had a MAG3 scan confirmed presence of bilateral obstruction. She is s/p open transverse colon conduit urinary diversion on 9/11/2020.     Interval History:   Patient feels well. Tolerating diet.   IR drains slowing down in output.   UOP good per urostomy.       Objective:     Temp:  [97.1 °F (36.2 °C)-99.2 °F (37.3 °C)] 97.1 °F (36.2 °C)  Pulse:  [79-94] 85  Resp:  [16-23] 18  SpO2:  [95 %-98 %] 97 %  BP: (129-159)/(63-91) 159/91     Body mass index is 28.94 kg/m².           Drains     Drain                 Urostomy 09/11/20 ileal conduit LLQ 27 days         Ileostomy 09/18/20 RLQ 20 days         Closed/Suction Drain 10/05/20 1038 Right;Superior Abdomen Bulb 10 Fr. 2 days         Closed/Suction Drain 10/05/20 1039 Right;Inferior Bulb 10 Fr. 2 days                Physical Exam   Constitutional: No distress.   HENT:   Head: Normocephalic and atraumatic.   Eyes: Conjunctivae are normal. No scleral icterus.   Neck: Normal range of motion.   Cardiovascular: Normal rate.    Pulmonary/Chest: Effort normal. No respiratory distress.   Abdominal: Soft. She exhibits no distension. There is no abdominal tenderness. There is no rebound and no guarding.   Urostomy p/p/p draining clear yellow urine  Ileostomy output thickened   Midline incision with wound vac in place      Musculoskeletal: Normal range of motion.      Comments: SCDs in place   Neurological: She is alert.   Skin: Skin is warm and dry. She is not diaphoretic.          Significant Labs:    BMP:  Recent Labs   Lab 10/10/20  0515 10/11/20  0639 10/12/20  0421    142 139   K 2.9* 3.0* 3.0*    105 102   CO2 28 28 28   BUN 8 6 5*   CREATININE 0.8 0.8 0.8   CALCIUM 8.2* 8.1* 8.1*       CBC:   Recent Labs   Lab 10/09/20  0553 10/10/20  0515 10/11/20  0639   WBC 6.60 6.59 6.73   HGB 6.6* 6.8* 7.6*   HCT 23.7* 24.4* 26.5*    341 348       Blood Culture:   Recent Labs   Lab 10/06/20  0628 10/06/20  0631   LABBLOO No growth after 5 days. No growth after 5 days.     Urine Culture: No results for input(s): LABURIN in the last 168 hours.  Urine Studies: No results for input(s): COLORU, APPEARANCEUA, PHUR, SPECGRAV, PROTEINUA, GLUCUA, KETONESU, BILIRUBINUA, OCCULTUA, NITRITE, UROBILINOGEN, LEUKOCYTESUR, RBCUA, WBCUA, BACTERIA, SQUAMEPITHEL, HYALINECASTS in the last 168 hours.    Invalid input(s): WRIGHTSUR    Significant Imaging:  All pertinent imaging results/findings from the past 24 hours have been reviewed.          Review of Systems          Assessment/Plan:     * Bilateral ureteral obstruction  S/p urinary diversion with colon conduit 9/11   S/p emergent ex-lap 9/17, colo-colic anastomosis intact, bowel perforation found, underwent resection of right colon, remaining transverses colon, and proximal descending colon, ileostomy creation, Charlotte's pouch created     - CRS primary  - regular diet per Primary  - nephrostomy tubes removed 10/1 - Cr stable, good UOP from ostomy  - continue PT/OT, OOBTC as tolerated, encourage ambulation  - Wound care ostomy consulted for assistance with ostomy teaching, wound care  - patient is a Sh[employer]; management per primary and Heme-Onc (on EPO, B12, and iron)  - Abx per ID and primary.    - Drains: maintain IR drains   - Prophylaxis: IS, SCDs, GI ppx    - while waiting for placement, can consider having patient's daughter come learn how to help with wound care        VTE Risk Mitigation (From admission, onward)          Ordered     enoxaparin injection 40 mg  Every 24 hours      10/06/20 1001     IP VTE HIGH RISK PATIENT  Once      09/11/20 2024     Place sequential compression device  Until discontinued      09/11/20 2024                Mana Wilks MD  Urology  [facility]

## 2020-10-12 NOTE — PLAN OF CARE
10/12/20 0847   Post-Acute Status   Post-Acute Authorization Placement   Discharge Delays   (NO ACCEPTING FACILITIES)     SW continues to seek placement for this pt for LTAC placement. Following are updates:     Luz- continued to reach admission staff, left urgent messages.  [facility]- Disconnected phone #[phone].  SIMONE Martinez- Declined via [facility].     10:02 AM  [facility]- currently discussing for possible review for placement.     3:22 PM  SW contacted [facility] left voice message with [place] #[phone].     EDWARDO spoke to Muriel wagner/[facility]-  who reports Authorization has been submitted today @1:30, pending authorization approval.     CM team will continue to follow for placement.    Kira Dawkins LMSW  Case Management   [facility]   Ext. [phone]

## 2020-10-12 NOTE — PT/OT/SLP PROGRESS
"Physical Therapy Treatment    Patient Name:  Edita Riley   MRN:  [id number]    Recommendations:     Discharge Recommendations:  rehabilitation facility   Discharge Equipment Recommendations: walker, rolling, bedside commode   Barriers to discharge: Decreased caregiver support    Assessment:     Edita Riley is a 55 y.o. female admitted with a medical diagnosis of Bilateral ureteral obstruction.  She presents with the following impairments/functional limitations:  impaired endurance, impaired self care skills, impaired functional mobilty, impaired balance, gait instability. Today patient continues to improve with mobility tolerance. She was able to walk 150 ft with RW and SBA with no seated rest and chair f/u for safety. Will benefit from continued therapy while in hospital.     Rehab Prognosis: Good; patient would benefit from acute skilled PT services to address these deficits and reach maximum level of function.    Recent Surgery: Procedure(s) (LRB):  Nephrostogram - antegrade (Bilateral)  REMOVAL, STENT, URETER (Bilateral) 14 Days Post-Op    Plan:     During this hospitalization, patient to be seen 4 x/week to address the identified rehab impairments via gait training, therapeutic activities, therapeutic exercises and progress toward the following goals:    · Plan of Care Expires:  11/21/20    Subjective     Chief Complaint: none  Patient/Family Comments/goals: "I feel great".   Pain/Comfort:  · Pain Rating 1: 0/10      Objective:     Communicated with NSG prior to session.  Patient found up in chair with peripheral IV, wound vac, ORESTES drain, colostomy upon PT entry to room.     General Precautions: Standard, fall   Orthopedic Precautions:N/A   Braces: N/A     Functional Mobility:  · Bed Mobility:     · Transfers:     · Sit to Stand:  supervision with rolling walker  · Gait: Patient ambulated 150 ft with stand by assistance and rolling walker with vcs for walker management during changes in " direction.       AM-PAC 6 CLICK MOBILITY  Turning over in bed (including adjusting bedclothes, sheets and blankets)?: 3  Sitting down on and standing up from a chair with arms (e.g., wheelchair, bedside commode, etc.): 3  Moving from lying on back to sitting on the side of the bed?: 3  Moving to and from a bed to a chair (including a wheelchair)?: 3  Need to walk in hospital room?: 3  Climbing 3-5 steps with a railing?: 3  Basic Mobility Total Score: 18       Therapeutic Activities and Exercises:    Gait training:  Patient required cues for position in walker and sequencing to increase independence and safety.  Patient required cues ~ 25% of the time.       Patient Education:    Patient educated on assistive device use, Fall risk, gait training, plan of care and risks of prolonged bed rest by explanation.  Patient was receptive to education and verbalizes understanding. Reports she has been doing her LE exercises while sitting up in chair.     Patient left up in chair with all lines intact, call button in reach and nurse aware..    GOALS:   Multidisciplinary Problems     Physical Therapy Goals        Problem: Physical Therapy Goal    Goal Priority Disciplines Outcome Goal Variances Interventions   Physical Therapy Goal     PT, PT/OT Ongoing, Not Progressing     Description: Patient re-evaluated 20 s/p further surgery.  Goals to be met by: 10/14/2020    Patient will increase functional independence with mobility by performin. Supine to sit with Set-up [place]  2. Sit to supine with Set-up [place]  3. Sit to stand transfer with Supervision  4. Bed to chair transfer with Supervision using Rolling Walker  5. Gait  x 250 feet with Stand-by Assistance using Rolling Walker.   6. Ascend/descend 3 stair with left Handrails Stand-by Assistance                   Time Tracking:     PT Received On: 10/12/20  PT Start Time: 1049     PT Stop Time: 1112  PT Total Time (min): 23 min     Billable Minutes: Gait  Training 15 and Therapeutic Activity 8    Treatment Type: Treatment  PT/PTA: PTA     PTA Visit Number: 5     Genevieve Ludy, PTA  10/12/2020    Co tx performed for this visit due to patient need for 2 skilled therapists to ensure patient and staff safety and to accommodate for patient activity tolerance.

## 2020-10-12 NOTE — SUBJECTIVE & OBJECTIVE
Interval History:   Patient feels well. Tolerating diet.   IR drains slowing down in output.   UOP good per urostomy.       Objective:     Temp:  [97.1 °F (36.2 °C)-99.2 °F (37.3 °C)] 97.1 °F (36.2 °C)  Pulse:  [79-94] 85  Resp:  [16-23] 18  SpO2:  [95 %-98 %] 97 %  BP: (129-159)/(63-91) 159/91     Body mass index is 28.94 kg/m².           Drains     Drain                 Urostomy 09/11/20 ileal conduit LLQ 27 days         Ileostomy 09/18/20 RLQ 20 days         Closed/Suction Drain 10/05/20 1038 Right;Superior Abdomen Bulb 10 Fr. 2 days         Closed/Suction Drain 10/05/20 1039 Right;Inferior Bulb 10 Fr. 2 days                Physical Exam   Constitutional: No distress.   HENT:   Head: Normocephalic and atraumatic.   Eyes: Conjunctivae are normal. No scleral icterus.   Neck: Normal range of motion.   Cardiovascular: Normal rate.    Pulmonary/Chest: Effort normal. No respiratory distress.   Abdominal: Soft. She exhibits no distension. There is no abdominal tenderness. There is no rebound and no guarding.   Urostomy p/p/p draining clear yellow urine  Ileostomy output thickened   Midline incision with wound vac in place      Musculoskeletal: Normal range of motion.      Comments: SCDs in place   Neurological: She is alert.   Skin: Skin is warm and dry. She is not diaphoretic.         Significant Labs:    BMP:  Recent Labs   Lab 10/10/20  0515 10/11/20  0639 10/12/20  0421    142 139   K 2.9* 3.0* 3.0*    105 102   CO2 28 28 28   BUN 8 6 5*   CREATININE 0.8 0.8 0.8   CALCIUM 8.2* 8.1* 8.1*       CBC:   Recent Labs   Lab 10/09/20  0553 10/10/20  0515 10/11/20  0639   WBC 6.60 6.59 6.73   HGB 6.6* 6.8* 7.6*   HCT 23.7* 24.4* 26.5*    341 348       Blood Culture:   Recent Labs   Lab 10/06/20  0628 10/06/20  0631   LABBLOO No growth after 5 days. No growth after 5 days.     Urine Culture: No results for input(s): LABURIN in the last 168 hours.  Urine Studies: No results for input(s): COLORU,  APPEARANCEUA, PHUR, SPECGRAV, PROTEINUA, GLUCUA, KETONESU, BILIRUBINUA, OCCULTUA, NITRITE, UROBILINOGEN, LEUKOCYTESUR, RBCUA, WBCUA, BACTERIA, SQUAMEPITHEL, HYALINECASTS in the last 168 hours.    Invalid input(s): NICK    Significant Imaging:  All pertinent imaging results/findings from the past 24 hours have been reviewed.          Review of Systems

## 2020-10-12 NOTE — PT/OT/SLP PROGRESS
Occupational Therapy   Treatment    Name: Edita Riley  MRN: [id number]  Admitting Diagnosis:  Bilateral ureteral obstruction  14 Days Post-Op  *co-treatment with PTA  Recommendations:     Discharge Recommendations: rehabilitation facility  Discharge Equipment Recommendations:  walker, rolling, bedside commode  Barriers to discharge:  None    Assessment:     Edita Riley is a 55 y.o. female with a medical diagnosis of Bilateral ureteral obstruction.  She presents seated in bedside chair, willing to participate in OT/PTA session. Pt is progressing towards goals and demonstrated improvement in activity tolerance. Performance deficits affecting function are impaired endurance, impaired self care skills, impaired functional mobilty, gait instability, impaired balance. Pt would benefit from skilled OT services in order to maximize independence with ADLs and facilitate safe discharge.     Rehab Prognosis:  Good; patient would benefit from acute skilled OT services to address these deficits and reach maximum level of function.       Plan:     Patient to be seen 3 x/week to address the above listed problems via self-care/home management, therapeutic activities, therapeutic exercises  · Plan of Care Expires: 11/08/20  · Plan of Care Reviewed with: patient    Subjective     Pain/Comfort:  · Pain Rating 1: 0/10    Objective:     Communicated with: RN and PTA prior to session.  Patient found up in chair with peripheral IV, wound vac, ORESTES drain, colostomy upon OT entry to room.    General Precautions: Standard, fall   Orthopedic Precautions:N/A   Braces: N/A     Occupational Performance:     Bed Mobility:    · Did not observe    Functional Mobility/Transfers:  · Patient completed Sit <> Stand Transfer with supervision  with  hand-held assist   · Functional Mobility: Pt ambulated household distance with  SBA and RW in order to maximize functional activity tolerance and standing balance required for  engagement in occupations of choice.  Pt required VCs for RW management and increased assistance navigating turns. Chair follow for safety, no seated rest break.     Activities of Daily Living:  · Grooming: stand by assistance standing balance to perform oral hygiene      AMPA 6 Click ADL: 19    Treatment & Education:  -Therapist provided facilitation and instruction of proper body mechanics, energy conservation, and fall prevention strategies during tasks listed above.  -Pt educated on role of OT, POC and goals for therapy  -Co-tx with PT performed due to need for education and assistance from two skilled therapy disciplines at pt's current functional level.   -Pt educated on importance of OOB activities with staff member assistance and sitting OOB majority of the day.   -Pt verbalized understanding. Pt expressed no further concerns/questions  -Whiteboard updated    Patient left up in chair with all lines intact and call button in reachEducation:      GOALS:   Multidisciplinary Problems     Occupational Therapy Goals        Problem: Occupational Therapy Goal    Goal Priority Disciplines Outcome Interventions   Occupational Therapy Goal     OT, PT/OT Ongoing, Progressing    Description: Goals to be met by: 10/18/2020    Patient will increase functional independence with ADLs by performing:    LE Dressing with Supervision.  Grooming while standing with Supervision.   Toileting from toilet with Supervision for hygiene and clothing management.   Supine to sit with Supervision.  Toilet transfer to toilet with Supervision.                     Time Tracking:     OT Date of Treatment: 10/12/20  OT Start Time: 1049  OT Stop Time: 1112  OT Total Time (min): 23 min    Billable Minutes:Self Care/Home Management 10  Therapeutic Activity 13    Stephanie Sales OT  10/12/2020

## 2020-10-13 VITALS
BODY MASS INDEX: 29.03 KG/M2 | RESPIRATION RATE: 18 BRPM | OXYGEN SATURATION: 95 % | SYSTOLIC BLOOD PRESSURE: 144 MMHG | HEART RATE: 87 BPM | DIASTOLIC BLOOD PRESSURE: 79 MMHG | WEIGHT: 196 LBS | HEIGHT: 69 IN | TEMPERATURE: 98 F

## 2020-10-13 LAB
ALBUMIN SERPL BCP-MCNC: 2 G/DL (ref 3.5–5.2)
ALP SERPL-CCNC: 65 U/L (ref 55–135)
ALT SERPL W/O P-5'-P-CCNC: <5 U/L (ref 10–44)
ANION GAP SERPL CALC-SCNC: 12 MMOL/L (ref 8–16)
ANISOCYTOSIS BLD QL SMEAR: SLIGHT
AST SERPL-CCNC: 13 U/L (ref 10–40)
BASOPHILS # BLD AUTO: 0.03 K/UL (ref 0–0.2)
BASOPHILS NFR BLD: 0.4 % (ref 0–1.9)
BILIRUB SERPL-MCNC: 0.3 MG/DL (ref 0.1–1)
BUN SERPL-MCNC: 7 MG/DL (ref 6–20)
CALCIUM SERPL-MCNC: 8.3 MG/DL (ref 8.7–10.5)
CHLORIDE SERPL-SCNC: 104 MMOL/L (ref 95–110)
CO2 SERPL-SCNC: 27 MMOL/L (ref 23–29)
CREAT SERPL-MCNC: 0.8 MG/DL (ref 0.5–1.4)
CRP SERPL-MCNC: 52.6 MG/L (ref 0–8.2)
DIFFERENTIAL METHOD: ABNORMAL
EOSINOPHIL # BLD AUTO: 0.1 K/UL (ref 0–0.5)
EOSINOPHIL NFR BLD: 2 % (ref 0–8)
ERYTHROCYTE [DISTWIDTH] IN BLOOD BY AUTOMATED COUNT: 19.8 % (ref 11.5–14.5)
EST. GFR  (AFRICAN AMERICAN): >60 ML/MIN/1.73 M^2
EST. GFR  (NON AFRICAN AMERICAN): >60 ML/MIN/1.73 M^2
GLUCOSE SERPL-MCNC: 100 MG/DL (ref 70–110)
HCT VFR BLD AUTO: 28.6 % (ref 37–48.5)
HGB BLD-MCNC: 7.9 G/DL (ref 12–16)
IMM GRANULOCYTES # BLD AUTO: 0.04 K/UL (ref 0–0.04)
IMM GRANULOCYTES NFR BLD AUTO: 0.6 % (ref 0–0.5)
LYMPHOCYTES # BLD AUTO: 1.3 K/UL (ref 1–4.8)
LYMPHOCYTES NFR BLD: 18.4 % (ref 18–48)
MAGNESIUM SERPL-MCNC: 1.4 MG/DL (ref 1.6–2.6)
MCH RBC QN AUTO: 26.3 PG (ref 27–31)
MCHC RBC AUTO-ENTMCNC: 27.6 G/DL (ref 32–36)
MCV RBC AUTO: 95 FL (ref 82–98)
MONOCYTES # BLD AUTO: 0.9 K/UL (ref 0.3–1)
MONOCYTES NFR BLD: 13 % (ref 4–15)
NEUTROPHILS # BLD AUTO: 4.5 K/UL (ref 1.8–7.7)
NEUTROPHILS NFR BLD: 65.6 % (ref 38–73)
NRBC BLD-RTO: 1 /100 WBC
OVALOCYTES BLD QL SMEAR: ABNORMAL
PHOSPHATE SERPL-MCNC: 2.6 MG/DL (ref 2.7–4.5)
PLATELET # BLD AUTO: 336 K/UL (ref 150–350)
PLATELET BLD QL SMEAR: ABNORMAL
PMV BLD AUTO: 10 FL (ref 9.2–12.9)
POCT GLUCOSE: 101 MG/DL (ref 70–110)
POCT GLUCOSE: 103 MG/DL (ref 70–110)
POCT GLUCOSE: 106 MG/DL (ref 70–110)
POCT GLUCOSE: 109 MG/DL (ref 70–110)
POIKILOCYTOSIS BLD QL SMEAR: SLIGHT
POLYCHROMASIA BLD QL SMEAR: ABNORMAL
POTASSIUM SERPL-SCNC: 2.9 MMOL/L (ref 3.5–5.1)
PROT SERPL-MCNC: 6.5 G/DL (ref 6–8.4)
RBC # BLD AUTO: 3 M/UL (ref 4–5.4)
SODIUM SERPL-SCNC: 143 MMOL/L (ref 136–145)
WBC # BLD AUTO: 6.83 K/UL (ref 3.9–12.7)

## 2020-10-13 PROCEDURE — 25000003 PHARM REV CODE 250: Performed by: NURSE PRACTITIONER

## 2020-10-13 PROCEDURE — 80053 COMPREHEN METABOLIC PANEL: CPT

## 2020-10-13 PROCEDURE — 63600175 PHARM REV CODE 636 W HCPCS: Performed by: STUDENT IN AN ORGANIZED HEALTH CARE EDUCATION/TRAINING PROGRAM

## 2020-10-13 PROCEDURE — 84100 ASSAY OF PHOSPHORUS: CPT

## 2020-10-13 PROCEDURE — 25000003 PHARM REV CODE 250: Performed by: STUDENT IN AN ORGANIZED HEALTH CARE EDUCATION/TRAINING PROGRAM

## 2020-10-13 PROCEDURE — 97110 THERAPEUTIC EXERCISES: CPT

## 2020-10-13 PROCEDURE — A4216 STERILE WATER/SALINE, 10 ML: HCPCS | Performed by: STUDENT IN AN ORGANIZED HEALTH CARE EDUCATION/TRAINING PROGRAM

## 2020-10-13 PROCEDURE — 97535 SELF CARE MNGMENT TRAINING: CPT

## 2020-10-13 PROCEDURE — 25000003 PHARM REV CODE 250: Performed by: INTERNAL MEDICINE

## 2020-10-13 PROCEDURE — 25000003 PHARM REV CODE 250: Performed by: COLON & RECTAL SURGERY

## 2020-10-13 PROCEDURE — 25000242 PHARM REV CODE 250 ALT 637 W/ HCPCS: Performed by: NURSE PRACTITIONER

## 2020-10-13 PROCEDURE — 83735 ASSAY OF MAGNESIUM: CPT

## 2020-10-13 PROCEDURE — 85025 COMPLETE CBC W/AUTO DIFF WBC: CPT

## 2020-10-13 PROCEDURE — 86140 C-REACTIVE PROTEIN: CPT

## 2020-10-13 RX ORDER — ONDANSETRON 2 MG/ML
4 INJECTION INTRAMUSCULAR; INTRAVENOUS EVERY 6 HOURS PRN
Status: CANCELLED | OUTPATIENT
Start: 2020-10-13

## 2020-10-13 RX ORDER — ACETAMINOPHEN 325 MG/1
650 TABLET ORAL EVERY 6 HOURS PRN
Status: CANCELLED | OUTPATIENT
Start: 2020-10-13

## 2020-10-13 RX ORDER — LABETALOL HCL 20 MG/4 ML
10 SYRINGE (ML) INTRAVENOUS EVERY 4 HOURS PRN
Status: CANCELLED | OUTPATIENT
Start: 2020-10-13

## 2020-10-13 RX ORDER — OXYCODONE AND ACETAMINOPHEN 5; 325 MG/1; MG/1
1 TABLET ORAL EVERY 4 HOURS PRN
Status: CANCELLED | OUTPATIENT
Start: 2020-10-13

## 2020-10-13 RX ORDER — TRAZODONE HYDROCHLORIDE 50 MG/1
50 TABLET ORAL NIGHTLY
Status: CANCELLED | OUTPATIENT
Start: 2020-10-13

## 2020-10-13 RX ORDER — INSULIN ASPART 100 [IU]/ML
1-10 INJECTION, SOLUTION INTRAVENOUS; SUBCUTANEOUS EVERY 4 HOURS PRN
Status: CANCELLED | OUTPATIENT
Start: 2020-10-13

## 2020-10-13 RX ORDER — PANTOPRAZOLE SODIUM 40 MG/1
40 TABLET, DELAYED RELEASE ORAL DAILY
Status: CANCELLED | OUTPATIENT
Start: 2020-10-14

## 2020-10-13 RX ORDER — FLUCONAZOLE 200 MG/1
400 TABLET ORAL DAILY
Status: CANCELLED | OUTPATIENT
Start: 2020-10-14

## 2020-10-13 RX ORDER — GABAPENTIN 300 MG/1
300 CAPSULE ORAL 3 TIMES DAILY
Status: CANCELLED | OUTPATIENT
Start: 2020-10-13

## 2020-10-13 RX ORDER — METRONIDAZOLE 500 MG/1
500 TABLET ORAL EVERY 8 HOURS
Status: CANCELLED | OUTPATIENT
Start: 2020-10-13

## 2020-10-13 RX ORDER — CYANOCOBALAMIN 1000 UG/ML
100 INJECTION, SOLUTION INTRAMUSCULAR; SUBCUTANEOUS WEEKLY
Status: CANCELLED | OUTPATIENT
Start: 2020-10-17

## 2020-10-13 RX ORDER — CEFEPIME HYDROCHLORIDE 1 G/1
1 INJECTION, POWDER, FOR SOLUTION INTRAMUSCULAR; INTRAVENOUS
Status: CANCELLED | OUTPATIENT
Start: 2020-10-14

## 2020-10-13 RX ORDER — METOPROLOL TARTRATE 25 MG/1
25 TABLET, FILM COATED ORAL 2 TIMES DAILY
Status: CANCELLED | OUTPATIENT
Start: 2020-10-13

## 2020-10-13 RX ORDER — ENOXAPARIN SODIUM 100 MG/ML
40 INJECTION SUBCUTANEOUS EVERY 24 HOURS
Status: CANCELLED | OUTPATIENT
Start: 2020-10-14

## 2020-10-13 RX ADMIN — Medication 10 ML: at 06:10

## 2020-10-13 RX ADMIN — CEFEPIME 1 G: 1 INJECTION, POWDER, FOR SOLUTION INTRAMUSCULAR; INTRAVENOUS at 12:10

## 2020-10-13 RX ADMIN — PANTOPRAZOLE SODIUM 40 MG: 40 TABLET, DELAYED RELEASE ORAL at 08:10

## 2020-10-13 RX ADMIN — LOPERAMIDE HYDROCHLORIDE 2 MG: 2 CAPSULE ORAL at 08:10

## 2020-10-13 RX ADMIN — CEFEPIME 1 G: 1 INJECTION, POWDER, FOR SOLUTION INTRAMUSCULAR; INTRAVENOUS at 04:10

## 2020-10-13 RX ADMIN — METRONIDAZOLE 500 MG: 500 TABLET ORAL at 02:10

## 2020-10-13 RX ADMIN — EPOETIN ALFA-EPBX 20000 UNITS: 10000 INJECTION, SOLUTION INTRAVENOUS; SUBCUTANEOUS at 04:10

## 2020-10-13 RX ADMIN — PSYLLIUM HUSK 1 PACKET: 3.4 POWDER ORAL at 08:10

## 2020-10-13 RX ADMIN — METOPROLOL TARTRATE 25 MG: 25 TABLET, FILM COATED ORAL at 08:10

## 2020-10-13 RX ADMIN — METRONIDAZOLE 500 MG: 500 TABLET ORAL at 05:10

## 2020-10-13 RX ADMIN — FLUCONAZOLE 400 MG: 200 TABLET ORAL at 08:10

## 2020-10-13 RX ADMIN — Medication 10 ML: at 12:10

## 2020-10-13 RX ADMIN — CEFEPIME 1 G: 1 INJECTION, POWDER, FOR SOLUTION INTRAMUSCULAR; INTRAVENOUS at 07:10

## 2020-10-13 RX ADMIN — ENOXAPARIN SODIUM 40 MG: 100 INJECTION SUBCUTANEOUS at 04:10

## 2020-10-13 NOTE — PROGRESS NOTES
Wound care follow up:     Abdomen wound vac dressing changed as directed. Abdomen wounds improving greatly with increased granulation tissue. Changed leaking ileostomy and urostomy pouch using Lico ring and 3M cavilon spray. Ileostomy stoma pink and raised with beginning of mucosal separation starting to resolve. Urostomy stoma is pink with rosebud appearance. Spoke with patient regarding daughter coming to hospital for ostomy training session. Patient stated that she does not know if daughter will be available and that she would have to talk to her  to find out. MD team notified of conversation with patient regarding family involvement in her ostomy care. Will continue to follow pt PRN [id number]    Midline abdominal wounds

## 2020-10-13 NOTE — PLAN OF CARE
10/13/20 1647   Post-Acute Status   Post-Acute Authorization Placement   Post-Acute Placement Status Authorization Obtained     Patient's Authorization has been obtained to [facility], pending bed availability. CM team will continue to follow.    TA has bed availability.     Kira Dawkins LMSW  Case Management   [facility]   Ext. [phone]

## 2020-10-13 NOTE — ASSESSMENT & PLAN NOTE
Ms. Riley is a 55 y.o. female with b/l ureteral obstruction s/p transverse colon conduit on 9/11/2020. Extended R colectomy and ileostomy creation on 9/17.     - Patient continues to progress appropriately  - tolerating diet  - denies any bouts of nausea, emesis  - nausea prns for any recurrent episodes  - vital signs stable  - continue IV antibiotics per ID's recs  - WBC normal  - No growth from recent abdominal abscess cultures, blood cultures  - 2 RLQ IR drains in place with minimal serous output  - continue to trend CRP  - per nephro recs; patient to follow-up with nephro in 4 wks  - monitor electrolytes; replace as needed  - ileostomy and urostomy in place with appropriate output  - wound care following for vac changes twice weekly  - continue to monitor I/Os  - lovenox for DVT ppx  - OOB, activity as tolerated  - PT/OT  - awaiting acceptance to LTAC  - discharge planning with EDWARDO

## 2020-10-13 NOTE — PT/OT/SLP PROGRESS
Occupational Therapy   Treatment    Name: Edita Riley  MRN: [id number]  Admitting Diagnosis:  Bilateral ureteral obstruction  15 Days Post-Op    Recommendations:     Discharge Recommendations: rehabilitation facility  Discharge Equipment Recommendations:  walker, rolling, bedside commode  Barriers to discharge:  None    Assessment:     Edita Riley is a 55 y.o. female with a medical diagnosis of Bilateral ureteral obstruction.  She presents with impairments listed below. Pt did well to tolerate and participate in the session. Pt is progressing towards goals at this time. Pt sis functioning below baseline and is requiring increased assist compared PLOF. Pt displayed global deconditioning requiring increased assist for ADLs and mobility at this time. Pt would benefit from skilled OT services to improve independence and overall occupational functioning.     Performance deficits affecting function are impaired self care skills, impaired functional mobilty.     Rehab Prognosis:  Good; patient would benefit from acute skilled OT services to address these deficits and reach maximum level of function.       Plan:     Patient to be seen 3 x/week to address the above listed problems via self-care/home management, therapeutic activities, therapeutic exercises, neuromuscular re-education  · Plan of Care Expires: 11/08/20  · Plan of Care Reviewed with: patient    Subjective     Pain/Comfort:  · Pain Rating 1: 0/10  · Pain Rating Post-Intervention 1: 0/10    Objective:     Communicated with: RN prior to session.  Patient found HOB elevated with colostomy, ORESTES drain, wound vac, peripheral IV upon OT entry to room.    General Precautions: Standard, fall   Orthopedic Precautions:N/A   Braces: N/A     Occupational Performance:     Bed Mobility:    · Patient completed Scooting/Bridging with supervision  · Patient completed Supine to Sit with supervision     Functional Mobility/Transfers:  · Patient completed Sit  <> Stand Transfer with stand by assistance  with  rolling walker   · Patient completed Bed <> Chair Transfer using Step Transfer technique with stand by assistance with rolling walker  · Functional Mobility: Pt ambulated ~320 ft at sba w/ RW.    Activities of Daily Living:  · Grooming: stand by assistance oral and facial hygiene while standing at the sink  · Upper Body Dressing: minimum assistance donned gown as robe  · Lower Body Dressing: maximal assistance donned socks seated      AMPAC 6 Click ADL: 19    Treatment & Education:  Pt educated on POC.     Patient left up in chair with all lines intact and call button in reachEducation:      GOALS:   Multidisciplinary Problems     Occupational Therapy Goals        Problem: Occupational Therapy Goal    Goal Priority Disciplines Outcome Interventions   Occupational Therapy Goal     OT, PT/OT Ongoing, Progressing    Description: Goals to be met by: 10/18/2020    Patient will increase functional independence with ADLs by performing:    LE Dressing with Supervision.  Grooming while standing with Supervision.   Toileting from toilet with Supervision for hygiene and clothing management.   Supine to sit with Supervision.  Toilet transfer to toilet with Supervision.                     Time Tracking:     OT Date of Treatment: 10/13/20  OT Start Time: 0929  OT Stop Time: 0952  OT Total Time (min): 23 min    Billable Minutes:Self Care/Home Management 10 minutes  Therapeutic Exercise 13 minutes    Oziel Raya OT  10/13/2020

## 2020-10-13 NOTE — SUBJECTIVE & OBJECTIVE
Patient progressed well post-operatively. Undergoing three-times weekly wound vac changes. Ostomy and urostomy education and instruction given prior to discharge. Please continue to assist patient with wound vac and ostomy/urostomy care.     Patient hemodynamically stable upon discharge. Working with PT/OT while inpatient. Will require continue physical and occupational therapy assistance.     Per Infectious Disease recs, patient will require continued IV antibiotics for 2-3 more weeks following discharge.    - continue fluconazole for fungemia, candida retinitis   - continue cefepime and flagyl for intraabdominal infection.

## 2020-10-13 NOTE — PROGRESS NOTES
[facility]  Colorectal Surgery  Progress Note    Patient Name: Edita Riley  MRN: [id number]  Admission Date: 9/11/2020  Hospital Length of Stay: 32 days  Attending Physician: Hammad Reynolds MD    Subjective:     Interval History: Patient seen and examined this am. No acute events overnight. Denies nausea, vomiting; tolerating diet well. No new complaints at this time. Awaiting placement.     Post-Op Info:  Procedure(s) (LRB):  Nephrostogram - antegrade (Bilateral)  REMOVAL, STENT, URETER (Bilateral)   15 Days Post-Op      Medications:  Continuous Infusions:  Scheduled Meds:   ceFEPime (MAXIPIME) IVPB  1 g Intravenous Q8H    cyanocobalamin  100 mcg Intramuscular Weekly    enoxaparin  40 mg Subcutaneous Q24H    epoetin yecenia-epbx  20,000 Units Subcutaneous Q48H    fluconazole  400 mg Oral Daily    loperamide  2 mg Oral BID    metoprolol tartrate  25 mg Oral BID    metroNIDAZOLE  500 mg Oral Q8H    pantoprazole  40 mg Oral Daily    psyllium husk (aspartame)  1 packet Oral Daily    sodium chloride 0.9%  10 mL Intravenous Q6H     PRN Meds:   acetaminophen    acetaminophen    dextrose 50%    glucagon (human recombinant)    HYDROmorphone    insulin aspart U-100    iohexol    iohexol    labetalol    ondansetron    oxyCODONE-acetaminophen    oxyCODONE-acetaminophen    sodium chloride 0.9%    sodium chloride 0.9%    sodium chloride 0.9%        Objective:     Vital Signs (Most Recent):  Temp: 98.2 °F (36.8 °C) (10/13/20 0300)  Pulse: 87 (10/12/20 0700)  Resp: 18 (10/12/20 0700)  BP: (!) 164/94 (10/12/20 0700)  SpO2: 95 % (10/12/20 0700) Vital Signs (24h Range):  Temp:  [97 °F (36.1 °C)-99 °F (37.2 °C)] 98.2 °F (36.8 °C)  Pulse:  [87] 87  Resp:  [18] 18  SpO2:  [95 %] 95 %  BP: (164)/(94) 164/94     Intake/Output - Last 3 Shifts       10/11 0700 - 10/12 0659 10/12 0700 - 10/13 0659    P.O.  150    Other 0 0    Total Intake(mL/kg) 0 (0) 150 (1.7)    Urine (mL/kg/hr) 2050  (1) 1100 (0.5)    Drains 10 0    Other 0 0    Stool 425 100    Total Output 2485 1200    Net -2485 -1050                Physical Exam  Vitals signs and nursing note reviewed.   Constitutional:       General: She is not in acute distress.     Appearance: Normal appearance. She is normal weight. She is not toxic-appearing.   HENT:      Head: Normocephalic and atraumatic.      Right Ear: External ear normal.      Left Ear: External ear normal.      Mouth/Throat:      Mouth: Mucous membranes are moist.   Neck:      Musculoskeletal: Normal range of motion.   Cardiovascular:      Rate and Rhythm: Normal rate and regular rhythm.      Pulses: Normal pulses.   Pulmonary:      Effort: Pulmonary effort is normal. No respiratory distress.   Abdominal:      Comments: Abdomen soft, non-distended; non-tender to palpation  Midline wound vac in place with excellent seal.  RLQ ostomy with brown, liquid output  LLQ urostomy with clear, yellow urine  1 RLQ IR drains in place with minimal serous output   Musculoskeletal: Normal range of motion.   Skin:     General: Skin is warm and dry.   Neurological:      General: No focal deficit present.      Mental Status: She is alert.   Psychiatric:         Mood and Affect: Mood normal.           Significant Labs:  CBC (Last 3 Results):   Recent Labs   Lab 10/11/20  0639 10/12/20  0421 10/13/20  0513   WBC 6.73 6.67 6.83   RBC 2.86* 2.92* 3.00*   HGB 7.6* 7.6* 7.9*   HCT 26.5* 27.3* 28.6*    340 336   MCV 93 94 95   MCH 26.6* 26.0* 26.3*   MCHC 28.7* 27.8* 27.6*     CMP (Last 3 Results):   Recent Labs   Lab 10/10/20  0515 10/11/20  0639 10/12/20  0421    100 99   CALCIUM 8.2* 8.1* 8.1*   ALBUMIN 1.9* 2.0* 1.9*   PROT 6.2 6.3 6.2    142 139   K 2.9* 3.0* 3.0*   CO2 28 28 28    105 102   BUN 8 6 5*   CREATININE 0.8 0.8 0.8   ALKPHOS 73 70 69   ALT 6* <5* <5*   AST 12 11 12   BILITOT 0.2 0.3 0.3       Significant Diagnostics:  I have reviewed all pertinent imaging  results/findings within the past 24 hours.    Assessment/Plan:     * Bilateral ureteral obstruction  Ms. Riley is a 55 y.o. female with b/l ureteral obstruction s/p transverse colon conduit on 9/11/2020. Extended R colectomy and ileostomy creation on 9/17.     - Patient continues to progress appropriately  - tolerating diet  - denies any bouts of nausea, emesis  - nausea prns for any recurrent episodes  - vital signs stable  - continue IV antibiotics per ID's recs  - WBC normal  - No growth from recent abdominal abscess cultures, blood cultures  - 2 RLQ IR drains in place with minimal serous output  - continue to trend CRP  - per nephro recs; patient to follow-up with nephro in 4 wks  - monitor electrolytes; replace as needed  - ileostomy and urostomy in place with appropriate output  - wound care following for vac changes twice weekly  - continue to monitor I/Os  - lovenox for DVT ppx  - OOB, activity as tolerated  - PT/OT  - awaiting acceptance to LTAC  - discharge planning with EDWARDO Martinez MD  Colorectal Surgery  [facility]

## 2020-10-13 NOTE — PLAN OF CARE
LAURA spoke with Nalini SANCHEZ at [facility] @ [phone] regarding single case agreement. LAURA reports single case agreement cannot be considered until patient has 3 in-network denials. Patient at this time has been denied from only 2 in-network facilities. [facility] and [facility]. SW to follow-up with [facility] to receive acceptance or denial for admission. Will continue to follow.

## 2020-10-13 NOTE — PLAN OF CARE
10/13/20 1715   Post-Acute Status   Post-Acute Authorization Placement   Post-Acute Placement Status Set-up Complete     Patient's set-up has been completed.EDWARDO scheduled d/c transportation to [facility] through [facility]. Patient is scheduled to be picked up at 6:15 pm. EDWARDO provided patient's nurse with report number [phone]; ask for the nurse for room #236. SW is in communication with patient's CM and patient's Care Team. Requested  time does not guarantee arrival time. No other SW needs has been identified at this time.     Kira Dawkins, DELPHINE  Case Management   [facility]   Ext. [phone]

## 2020-10-13 NOTE — SUBJECTIVE & OBJECTIVE
Subjective:     Interval History: Patient seen and examined this am. No acute events overnight. Denies nausea, vomiting; tolerating diet well. No new complaints at this time. Awaiting placement.     Post-Op Info:  Procedure(s) (LRB):  Nephrostogram - antegrade (Bilateral)  REMOVAL, STENT, URETER (Bilateral)   15 Days Post-Op      Medications:  Continuous Infusions:  Scheduled Meds:   ceFEPime (MAXIPIME) IVPB  1 g Intravenous Q8H    cyanocobalamin  100 mcg Intramuscular Weekly    enoxaparin  40 mg Subcutaneous Q24H    epoetin yecenia-epbx  20,000 Units Subcutaneous Q48H    fluconazole  400 mg Oral Daily    loperamide  2 mg Oral BID    metoprolol tartrate  25 mg Oral BID    metroNIDAZOLE  500 mg Oral Q8H    pantoprazole  40 mg Oral Daily    psyllium husk (aspartame)  1 packet Oral Daily    sodium chloride 0.9%  10 mL Intravenous Q6H     PRN Meds:   acetaminophen    acetaminophen    dextrose 50%    glucagon (human recombinant)    HYDROmorphone    insulin aspart U-100    iohexol    iohexol    labetalol    ondansetron    oxyCODONE-acetaminophen    oxyCODONE-acetaminophen    sodium chloride 0.9%    sodium chloride 0.9%    sodium chloride 0.9%        Objective:     Vital Signs (Most Recent):  Temp: 98.2 °F (36.8 °C) (10/13/20 0300)  Pulse: 87 (10/12/20 0700)  Resp: 18 (10/12/20 0700)  BP: (!) 164/94 (10/12/20 0700)  SpO2: 95 % (10/12/20 0700) Vital Signs (24h Range):  Temp:  [97 °F (36.1 °C)-99 °F (37.2 °C)] 98.2 °F (36.8 °C)  Pulse:  [87] 87  Resp:  [18] 18  SpO2:  [95 %] 95 %  BP: (164)/(94) 164/94     Intake/Output - Last 3 Shifts       10/11 0700 - 10/12 0659 10/12 0700 - 10/13 0659    P.O.  150    Other 0 0    Total Intake(mL/kg) 0 (0) 150 (1.7)    Urine (mL/kg/hr) 2050 (1) 1100 (0.5)    Drains 10 0    Other 0 0    Stool 425 100    Total Output 2485 1200    Net -2485 -1050                Physical Exam  Vitals signs and nursing note reviewed.   Constitutional:       General: She is not in acute  distress.     Appearance: Normal appearance. She is normal weight. She is not toxic-appearing.   HENT:      Head: Normocephalic and atraumatic.      Right Ear: External ear normal.      Left Ear: External ear normal.      Mouth/Throat:      Mouth: Mucous membranes are moist.   Neck:      Musculoskeletal: Normal range of motion.   Cardiovascular:      Rate and Rhythm: Normal rate and regular rhythm.      Pulses: Normal pulses.   Pulmonary:      Effort: Pulmonary effort is normal. No respiratory distress.   Abdominal:      Comments: Abdomen soft, non-distended; non-tender to palpation  Midline wound vac in place with excellent seal.  RLQ ostomy with brown, liquid output  LLQ urostomy with clear, yellow urine  1 RLQ IR drains in place with minimal serous output   Musculoskeletal: Normal range of motion.   Skin:     General: Skin is warm and dry.   Neurological:      General: No focal deficit present.      Mental Status: She is alert.   Psychiatric:         Mood and Affect: Mood normal.           Significant Labs:  CBC (Last 3 Results):   Recent Labs   Lab 10/11/20  0639 10/12/20  0421 10/13/20  0513   WBC 6.73 6.67 6.83   RBC 2.86* 2.92* 3.00*   HGB 7.6* 7.6* 7.9*   HCT 26.5* 27.3* 28.6*    340 336   MCV 93 94 95   MCH 26.6* 26.0* 26.3*   MCHC 28.7* 27.8* 27.6*     CMP (Last 3 Results):   Recent Labs   Lab 10/10/20  0515 10/11/20  0639 10/12/20  0421    100 99   CALCIUM 8.2* 8.1* 8.1*   ALBUMIN 1.9* 2.0* 1.9*   PROT 6.2 6.3 6.2    142 139   K 2.9* 3.0* 3.0*   CO2 28 28 28    105 102   BUN 8 6 5*   CREATININE 0.8 0.8 0.8   ALKPHOS 73 70 69   ALT 6* <5* <5*   AST 12 11 12   BILITOT 0.2 0.3 0.3       Significant Diagnostics:  I have reviewed all pertinent imaging results/findings within the past 24 hours.

## 2020-10-13 NOTE — PLAN OF CARE
Patient currently has no accepting facilities at this time. EDWARDO made follow up calls to the following facility. EDWARDO spoke to staff w/[facility] who reports referral is currently pending. Staff requested additional information to be faxed. EDWARDO hard fax updated clinicals to [facility] #[phone]. CM team will continue to follow.    10:32 AM  Pt received denial from LTAC extended care via . EDWARDO spoke to Nalini wagner/ JENNY who reports she will work on Authorization for possible placement to [facility].        Kira Dawkins, DELPHINE  Case Management   [facility]   Ext. [phone]

## 2020-10-13 NOTE — DISCHARGE SUMMARY
[facility]  Colorectal Surgery  Discharge Summary      Patient Name: Edita Riley  MRN: [id number]  Admission Date: 9/11/2020  Hospital Length of Stay: 32 days  Discharge Date and Time:  10/13/2020 5:19 PM  Attending Physician: Hammad Reynolds MD   Discharging Provider: Liliam Mckeon MD  Primary Care Provider: Audrey Mustafa MD     HPI:  Edita Riley is a 55yoF with a history of cervical cancer s/p radiation resulting in ureteral strictures for which she underwent b/l nephrostomy tube placement. On 9/11 she underwent a creation of a transverse colon urinary conduit to LLQ. On 9/14 she underwent placement of bilateral ureteral stents. Her hospital stay was complicated by a post-operative bowel perforation now with a RLQ end ileostomy. IR placed a RLQ drain on 9/23 into an air fluid collection concerning for abscess in the presence of persistent leukocytosis. On 9/28, Urology removed the ureteral stents and clamped both nephrostomy tubes, with the expectation that ureteral peristalsis improvement. Nephrology and ID consults place for ODESSA and fungemia, respectively.     Procedure(s) (LRB):  Nephrostogram - antegrade (Bilateral)  REMOVAL, STENT, URETER (Bilateral)     Hospital Course:  No notes on file    Consults (From admission, onward)        Status Ordering Provider     Inpatient consult to Hematology/Oncology  Once     Provider:  (Not yet assigned)    Completed ALFRED MARTINEZ     Inpatient consult to Infectious Diseases  Once     Provider:  (Not yet assigned)    Completed DAVIS PEREZ     Inpatient consult to Interventional Radiology  Once     Provider:  (Not yet assigned)    Completed MAKENNA GRIGGS     Inpatient consult to Interventional Radiology  Once     Provider:  (Not yet assigned)    Completed CHELSEY PRESCOTT     Inpatient consult to Interventional Radiology  Once     Provider:  (Not yet assigned)    Completed LILIAM MCKEON     Inpatient  consult to Midline team  Once     Provider:  (Not yet assigned)    Completed INDRA NATARAJAN     Inpatient consult to Nephrology  Once     Provider:  (Not yet assigned)    Completed DAVIS PEREZ     Inpatient consult to Ophthalmology  Once     Provider:  (Not yet assigned)    Completed ALFRED MARTINEZ     Inpatient consult to Physical Medicine Rehab  Once     Provider:  (Not yet assigned)    Completed INDRA NATARAJAN     Inpatient consult to PICC team ([facility])  Once     Provider:  (Not yet assigned)    Completed ALFRED MARTINEZ     Inpatient consult to Social Work  Once     Provider:  (Not yet assigned)    Completed SHAHEED CUNNINGAHM          Significant Diagnostic Studies: Labs:   CMP   Recent Labs   Lab 10/12/20  0421 10/13/20  0513    143   K 3.0* 2.9*    104   CO2 28 27   GLU 99 100   BUN 5* 7   CREATININE 0.8 0.8   CALCIUM 8.1* 8.3*   PROT 6.2 6.5   ALBUMIN 1.9* 2.0*   BILITOT 0.3 0.3   ALKPHOS 69 65   AST 12 13   ALT <5* <5*   ANIONGAP 9 12   ESTGFRAFRICA >60.0 >60.0   EGFRNONAA >60.0 >60.0    and CBC   Recent Labs   Lab 10/12/20  0421 10/13/20  0513   WBC 6.67 6.83   HGB 7.6* 7.9*   HCT 27.3* 28.6*    336       Pending Diagnostic Studies:     None        Final Active Diagnoses:    Diagnosis Date Noted POA    PRINCIPAL PROBLEM:  Bilateral ureteral obstruction [N13.5] 09/11/2020 Yes     Chronic    Anemia due to chronic blood loss [D50.0]  Unknown    Fungemia [B49] 09/27/2020 No    Hypokalemia [E87.6] 09/24/2020 Yes    Wound infection after surgery [T81.49XA] 09/24/2020 No    Peritonitis [K65.9] 09/22/2020 No    Acute blood loss anemia [D62] 09/22/2020 No    Moderate malnutrition [E44.0] 09/20/2020 Yes    ODESSA (acute kidney injury) [N17.9] 03/21/2020 Yes    Severe episode of recurrent major depressive disorder, with psychotic features [F33.3] 09/16/2015 Yes     Chronic    Impaired functional mobility, balance, gait, and endurance [Z74.09] 07/24/2015 Yes     Chronic     Essential hypertension [I10] 05/04/2015 Yes     Chronic      Problems Resolved During this Admission:      Discharged Condition: fair    Disposition: Long Term Care    Follow Up:    Patient Instructions:      Ambulatory referral/consult to Physical/Occupational Therapy   Standing Status: Future   Referral Priority: Routine Referral Type: Physical Medicine   Referral Reason: Specialty Services Required   Number of Visits Requested: 1     Medications:  Reconciled Home Medications:      Medication List      ASK your doctor about these medications    dicyclomine 20 mg tablet  Commonly known as: BENTYL  Take 1 tablet (20 mg total) by mouth 3 (three) times daily as needed.     gabapentin 300 MG capsule  Commonly known as: NEURONTIN  Take 1 capsule (300 mg total) by mouth 3 (three) times daily.     ketoconazole 2 % cream  Commonly known as: NIZORAL  Apply topically once daily to affected area     metroNIDAZOLE 500 MG tablet  Commonly known as: FLAGYL  Take 1 tablet (500 mg total) by mouth As instructed. Take 1 tablet at 1pm, 2pm, 11pm the day before surgery     neomycin 500 mg Tab  Commonly known as: MYCIFRADIN  Take 2 tablets (1,000 mg total) by mouth As instructed. Take 2 tablets at 1pm, 2pm, 11pm the day before surgery     omeprazole 40 MG capsule  Commonly known as: PRILOSEC  Take 1 capsule (40 mg total) by mouth once daily.     ondansetron 4 MG Tbdl  Commonly known as: ZOFRAN-ODT  Take 1 tablet (4 mg total) by mouth every 8 (eight) hours as needed (nausea or vomiting).     oxyCODONE 5 MG immediate release tablet  Commonly known as: ROXICODONE  Take 1 tablet (5 mg total) by mouth every 8 (eight) hours as needed for Pain.     polyethylene glycol 17 gram/dose powder  Commonly known as: GLYCOLAX  Take 17 g by mouth once daily.     traZODone 50 MG tablet  Commonly known as: DESYREL  TAKE 1 TABLET (50 MG TOTAL) BY MOUTH EVERY EVENING.            Mark Martinez MD  Colorectal Surgery  [facility]

## 2020-10-14 PROBLEM — E43 SEVERE MALNUTRITION: Chronic | Status: ACTIVE | Noted: 2020-10-14

## 2020-10-14 NOTE — PLAN OF CARE
Future Appointments   Date Time Provider Department Center   10/15/2020 10:00 AM Michelle Mohamud MD [facility] ID [address]   11/18/2020  1:00 PM Leslie Balbuena MD [facility] UROLOGY [address]   12/1/2020 10:45 AM Refugio Lemon MD [facility] GYN ONC [address]     Patient discharged to [facility] on 10/13/20.   10/14/20 1544   Final Note   Assessment Type Final Discharge Note   Anticipated Discharge Disposition LTAC   What phone number can be called within the next 1-3 days to see how you are doing after discharge?   ([phone])   Hospital Follow Up  Appt(s) scheduled? Yes   Discharge plans and expectations educations in teach back method with documentation complete? Yes   Right Care Referral Info   Post Acute Recommendation Other   Referral Type Long term Acute Care   Facility Name [facility]

## 2020-10-19 LAB
FUNGUS SPEC CULT: NORMAL
FUNGUS SPEC CULT: NORMAL

## 2020-10-21 PROBLEM — R18.8 INTRA-ABDOMINAL FLUID COLLECTION: Status: ACTIVE | Noted: 2020-10-21

## 2020-10-21 PROBLEM — R18.8: Status: ACTIVE | Noted: 2020-10-21

## 2020-10-22 ENCOUNTER — TELEPHONE (OUTPATIENT)
Dept: INTERVENTIONAL RADIOLOGY/VASCULAR | Facility: HOSPITAL | Age: 55
End: 2020-10-22

## 2020-10-28 LAB
FUNGUS SPEC CULT: ABNORMAL
FUNGUS SPEC CULT: NORMAL

## 2020-11-04 PROBLEM — I82.403 DVT OF LOWER EXTREMITY, BILATERAL: Status: ACTIVE | Noted: 2020-11-04

## 2020-11-09 LAB — FUNGUS SPEC CULT: NORMAL

## 2020-11-17 ENCOUNTER — ANESTHESIA EVENT (OUTPATIENT)
Dept: ENDOSCOPY | Facility: HOSPITAL | Age: 55
End: 2020-11-17
Payer: MEDICAID

## 2020-11-17 ENCOUNTER — TELEPHONE (OUTPATIENT)
Dept: GASTROENTEROLOGY | Facility: HOSPITAL | Age: 55
End: 2020-11-17

## 2020-11-17 DIAGNOSIS — R13.10 DYSPHAGIA, UNSPECIFIED TYPE: Primary | ICD-10-CM

## 2020-11-18 ENCOUNTER — ANESTHESIA (OUTPATIENT)
Dept: ENDOSCOPY | Facility: HOSPITAL | Age: 55
End: 2020-11-18
Payer: MEDICAID

## 2020-11-18 PROBLEM — R10.9 ABDOMINAL PAIN: Status: ACTIVE | Noted: 2020-11-18

## 2020-11-18 PROCEDURE — 63600175 PHARM REV CODE 636 W HCPCS: Performed by: NURSE ANESTHETIST, CERTIFIED REGISTERED

## 2020-11-18 PROCEDURE — 25000003 PHARM REV CODE 250: Performed by: NURSE ANESTHETIST, CERTIFIED REGISTERED

## 2020-11-18 PROCEDURE — 25000003 PHARM REV CODE 250: Performed by: INTERNAL MEDICINE

## 2020-11-18 RX ORDER — ONDANSETRON 2 MG/ML
INJECTION INTRAMUSCULAR; INTRAVENOUS
Status: DISCONTINUED | OUTPATIENT
Start: 2020-11-18 | End: 2020-11-18

## 2020-11-18 RX ORDER — LIDOCAINE HCL/PF 100 MG/5ML
SYRINGE (ML) INTRAVENOUS
Status: DISCONTINUED | OUTPATIENT
Start: 2020-11-18 | End: 2020-11-18

## 2020-11-18 RX ORDER — PROPOFOL 10 MG/ML
VIAL (ML) INTRAVENOUS
Status: DISCONTINUED | OUTPATIENT
Start: 2020-11-18 | End: 2020-11-18

## 2020-11-18 RX ADMIN — SODIUM CHLORIDE: 0.9 INJECTION, SOLUTION INTRAVENOUS at 02:11

## 2020-11-18 RX ADMIN — ONDANSETRON 4 MG: 2 INJECTION, SOLUTION INTRAMUSCULAR; INTRAVENOUS at 02:11

## 2020-11-18 RX ADMIN — PROPOFOL 60 MG: 10 INJECTION, EMULSION INTRAVENOUS at 02:11

## 2020-11-18 RX ADMIN — PROPOFOL 40 MG: 10 INJECTION, EMULSION INTRAVENOUS at 02:11

## 2020-11-18 RX ADMIN — LIDOCAINE HYDROCHLORIDE 60 MG: 20 INJECTION, SOLUTION INTRAVENOUS at 02:11

## 2020-11-18 NOTE — ANESTHESIA PREPROCEDURE EVALUATION
11/18/2020  Edita Riley is a 55 y.o., female.  Patient Active Problem List   Diagnosis    Osteoarthritis of back    Hiatal hernia    Essential hypertension    Lower extremity pain, diffuse    Weakness of both lower extremities    Impaired functional mobility, balance, gait, and endurance    Schatzki's ring    Colon polyp    Internal hemorrhoids    Cardiovascular event risk -- low    Hemifacial spasm    Severe episode of recurrent major depressive disorder, with psychotic features    Fibromyalgia    Facial palsy    Sleep stage dysfunction    Carpal tunnel syndrome on right    Weakness    Gastroesophageal reflux disease with esophagitis    Lymphadenopathy    History of cervical cancer    Metastatic squamous cell carcinoma to lymph node    Generalized anxiety disorder    PTSD (post-traumatic stress disorder)    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 drug    Diarrhea due to malabsorption    Seizure-like activity    Stroke    Electrolyte disorder    ODESSA (acute kidney injury)    Anemia due to chronic kidney disease    Need for shingles vaccine    Hydronephrosis s/p bilateral nephrostomy tube    Urinary tract infection associated with nephrostomy catheter    Family history of colon cancer    Nephrostomy tube displaced    Generalized abdominal pain    Abnormal mammogram    Radiation cystitis    Bilateral ureteral obstruction    Moderate malnutrition    Peritonitis    Acute blood loss anemia    Hypokalemia    Wound infection after surgery    Fungemia    Anemia due to chronic blood loss    Severe malnutrition    Peritoneal fluid collection    Intra-abdominal fluid collection    Postprocedural intraabdominal abscess    DVT of lower extremity, bilateral    Non-intractable vomiting    Abdominal pain       Anesthesia Evaluation       I have reviewed the  Medications.     Review of Systems  Anesthesia Hx:  Denies Family Hx of Anesthesia complications.   Cardiovascular:   Hypertension    Renal/:   Chronic Renal Disease    Hepatic/GI:   Hiatal Hernia, GERD    Musculoskeletal:   Arthritis     Neurological:   CVA Neuromuscular Disease,    Psych:   Psychiatric History          Physical Exam  General:  Well nourished    Airway/Jaw/Neck:  Airway Findings: Mouth Opening: Normal Tongue: Normal  General Airway Assessment: Adult  Mallampati: II      Dental:  Dental Findings: In tact   Chest/Lungs:  Chest/Lungs Findings: Clear to auscultation, Normal Respiratory Rate     Heart/Vascular:  Heart Findings: Rate: Normal  Rhythm: Regular Rhythm        Mental Status:  Mental Status Findings:  Cooperative, Alert and Oriented         Anesthesia Plan  Type of Anesthesia, risks & benefits discussed:  Anesthesia Type:  MAC  Patient's Preference: MAC  Intra-op Monitoring Plan:   Intra-op Monitoring Plan Comments:   Post Op Pain Control Plan:   Post Op Pain Control Plan Comments:   Induction:   IV  Beta Blocker:         Informed Consent: Patient understands risks and agrees with Anesthesia plan.  Questions answered. Anesthesia consent signed with patient.  ASA Score: 2     Day of Surgery Review of History & Physical:            Ready For Surgery From Anesthesia Perspective.

## 2020-11-18 NOTE — ANESTHESIA POSTPROCEDURE EVALUATION
Anesthesia Post Evaluation    Patient: Edita Riley    Procedure(s) Performed: Procedure(s) (LRB):  ESOPHAGOGASTRODUODENOSCOPY (EGD) (N/A)    Final Anesthesia Type: MAC    Patient location during evaluation: GI PACU  Patient participation: Yes- Able to Participate  Level of consciousness: awake and alert and oriented  Post-procedure vital signs: reviewed and stable  Pain management: adequate  Airway patency: patent    PONV status at discharge: No PONV  Anesthetic complications: no      Cardiovascular status: blood pressure returned to baseline, hemodynamically stable and stable  Respiratory status: unassisted, room air and spontaneous ventilation  Hydration status: euvolemic  Follow-up needed           Vitals Value Taken Time   /70 11/18/20 1444   Temp 36 11/18/20 1444   Pulse 101 11/18/20 1444   Resp 15 11/18/20 1444   SpO2 100 11/18/20 1444         No case tracking events are documented in the log.      Pain/Caitie Score: Pain Rating Post Med Admin: 2 (11/17/2020 10:42 PM)

## 2020-11-18 NOTE — TRANSFER OF CARE
"Anesthesia Transfer of Care Note    Patient: Edita Riley    Procedure(s) Performed: Procedure(s) (LRB):  ESOPHAGOGASTRODUODENOSCOPY (EGD) (N/A)    Patient location: GI    Anesthesia Type: MAC    Transport from OR: Transported from OR on room air with adequate spontaneous ventilation    Post pain: adequate analgesia    Post assessment: no apparent anesthetic complications and tolerated procedure well    Post vital signs: stable    Level of consciousness: awake and alert    Nausea/Vomiting: no nausea/vomiting    Complications: none    Transfer of care protocol was followed      Last vitals:   Visit Vitals  BP (!) 147/67 (BP Location: Left arm)   Pulse 100   Temp 37.2 °C (99 °F) (Temporal)   Resp 18   Ht 5' 9" (1.753 m)   Wt 69.4 kg (153 lb)   LMP 06/08/2014 (Approximate)   SpO2 100%   Breastfeeding No   BMI 22.59 kg/m²     "

## 2020-11-20 LAB
ACID FAST MOD KINY STN SPEC: NORMAL
ACID FAST MOD KINY STN SPEC: NORMAL
MYCOBACTERIUM SPEC QL CULT: NORMAL
MYCOBACTERIUM SPEC QL CULT: NORMAL

## 2020-11-25 ENCOUNTER — OFFICE VISIT (OUTPATIENT)
Dept: OPHTHALMOLOGY | Facility: CLINIC | Age: 55
End: 2020-11-25
Payer: MEDICAID

## 2020-11-25 DIAGNOSIS — B37.7 CANDIDEMIA: Primary | ICD-10-CM

## 2020-11-25 DIAGNOSIS — H25.13 CATARACT, NUCLEAR SCLEROTIC, BOTH EYES: ICD-10-CM

## 2020-11-25 LAB
ACID FAST MOD KINY STN SPEC: NORMAL
MYCOBACTERIUM SPEC QL CULT: NORMAL

## 2020-11-25 PROCEDURE — 99999 PR PBB SHADOW E&M-EST. PATIENT-LVL I: ICD-10-PCS | Mod: PBBFAC,,, | Performed by: OPHTHALMOLOGY

## 2020-11-25 PROCEDURE — 99211 OFF/OP EST MAY X REQ PHY/QHP: CPT | Mod: PBBFAC | Performed by: OPHTHALMOLOGY

## 2020-11-25 PROCEDURE — 92002 PR EYE EXAM, NEW PATIENT,INTERMED: ICD-10-PCS | Mod: S$PBB,,, | Performed by: OPHTHALMOLOGY

## 2020-11-25 PROCEDURE — 99999 PR PBB SHADOW E&M-EST. PATIENT-LVL I: CPT | Mod: PBBFAC,,, | Performed by: OPHTHALMOLOGY

## 2020-11-25 PROCEDURE — 92002 INTRM OPH EXAM NEW PATIENT: CPT | Mod: S$PBB,,, | Performed by: OPHTHALMOLOGY

## 2020-11-25 NOTE — PROGRESS NOTES
Subjective:       Patient ID: Edita Riley is a 55 y.o. female      Chief Complaint   Patient presents with    Eye Problem     History of Present Illness  HPI     55 y.o female pt here today for ER  FU.    Pt here for f/u of retinal lesion OS.  Was followed as inpatient for this   lesion and fungemia.  Va good OU.  No f/f/pain OU  Pt at rehabilitation facility now.     Eye med(s) - None     Taking fluconazole    Last edited by Micah Stanford MD on 11/25/2020  6:04 PM. (History)        Imaging:    OCT WNL.  Documentation only    Assessment/Plan:     1. Candidemia  - + blood cultures for Candida Lusitaniae 9/28/20, s/p admission with IV antifungals,   - last examined 10/6/20 noted to have small, yellow creamy lesion along superior arcade without surrounding changes without vitreous involvement   - at LTAC currently, on PO fluconazole  No sign of intraocular fungal infection today.  Previous lesion resolved  - RTC retina PRN    2. Cataract, nuclear sclerotic, both eyes  Excellent Va.  Observe    Can f/u with comprehensive eye doctor yearly    Follow up in 1 year (on 11/25/2021), or if symptoms worsen or fail to improve.

## 2020-11-30 LAB
ACID FAST MOD KINY STN SPEC: NORMAL
MYCOBACTERIUM SPEC QL CULT: NORMAL

## 2020-12-01 ENCOUNTER — TELEPHONE (OUTPATIENT)
Dept: GASTROENTEROLOGY | Facility: CLINIC | Age: 55
End: 2020-12-01

## 2020-12-01 NOTE — TELEPHONE ENCOUNTER
----- Message from Zenon Spencer MD sent at 12/1/2020 11:20 AM CST -----  Pathologic findings for recent EGD reviewed.  No evidence of H pylori infection identified.  Some mild inflammation was noted.  Continue current medications.  Follow up as needed with NP

## 2020-12-03 PROBLEM — R18.8 INTRA-ABDOMINAL FLUID COLLECTION: Status: RESOLVED | Noted: 2020-10-21 | Resolved: 2020-12-03

## 2020-12-03 PROBLEM — D62 ACUTE BLOOD LOSS ANEMIA: Status: RESOLVED | Noted: 2020-09-22 | Resolved: 2020-12-03

## 2020-12-03 PROBLEM — R18.8: Status: RESOLVED | Noted: 2020-10-21 | Resolved: 2020-12-03

## 2020-12-03 PROBLEM — E43 SEVERE MALNUTRITION: Chronic | Status: RESOLVED | Noted: 2020-10-14 | Resolved: 2020-12-03

## 2020-12-04 ENCOUNTER — TELEPHONE (OUTPATIENT)
Dept: GASTROENTEROLOGY | Facility: CLINIC | Age: 55
End: 2020-12-04

## 2020-12-07 LAB
ACID FAST MOD KINY STN SPEC: NORMAL
MYCOBACTERIUM SPEC QL CULT: NORMAL

## 2020-12-08 ENCOUNTER — HOSPITAL ENCOUNTER (INPATIENT)
Facility: HOSPITAL | Age: 55
LOS: 8 days | Discharge: NURSING, REHAB OR LONG-TERM CARE | DRG: 392 | End: 2020-12-16
Attending: EMERGENCY MEDICINE | Admitting: COLON & RECTAL SURGERY
Payer: MEDICAID

## 2020-12-08 DIAGNOSIS — R09.02 HYPOXIA: ICD-10-CM

## 2020-12-08 DIAGNOSIS — R06.02 SHORT OF BREATH ON EXERTION: ICD-10-CM

## 2020-12-08 DIAGNOSIS — R11.2 INTRACTABLE VOMITING WITH NAUSEA, UNSPECIFIED VOMITING TYPE: ICD-10-CM

## 2020-12-08 DIAGNOSIS — R62.7 FTT (FAILURE TO THRIVE) IN ADULT: ICD-10-CM

## 2020-12-08 DIAGNOSIS — R00.0 TACHYCARDIA: ICD-10-CM

## 2020-12-08 DIAGNOSIS — K21.00 GASTROESOPHAGEAL REFLUX DISEASE WITH ESOPHAGITIS, UNSPECIFIED WHETHER HEMORRHAGE: Chronic | ICD-10-CM

## 2020-12-08 DIAGNOSIS — I10 ESSENTIAL HYPERTENSION: Chronic | ICD-10-CM

## 2020-12-08 DIAGNOSIS — E86.1 HYPOVOLEMIA: Primary | ICD-10-CM

## 2020-12-08 DIAGNOSIS — Z74.09 IMPAIRED FUNCTIONAL MOBILITY, BALANCE, GAIT, AND ENDURANCE: Chronic | ICD-10-CM

## 2020-12-08 DIAGNOSIS — R23.8 SKIN IRRITATION: ICD-10-CM

## 2020-12-08 DIAGNOSIS — I82.4Y9 ACUTE DEEP VEIN THROMBOSIS (DVT) OF PROXIMAL VEIN OF LOWER EXTREMITY, UNSPECIFIED LATERALITY: ICD-10-CM

## 2020-12-08 DIAGNOSIS — Z93.6 STATUS POST ILEAL CONDUIT: ICD-10-CM

## 2020-12-08 DIAGNOSIS — Z43.3 COLOSTOMY CARE: ICD-10-CM

## 2020-12-08 DIAGNOSIS — N13.5 BILATERAL URETERAL OBSTRUCTION: Chronic | ICD-10-CM

## 2020-12-08 LAB
ALBUMIN SERPL BCP-MCNC: 2.9 G/DL (ref 3.5–5.2)
ALP SERPL-CCNC: 236 U/L (ref 55–135)
ALT SERPL W/O P-5'-P-CCNC: 41 U/L (ref 10–44)
ANION GAP SERPL CALC-SCNC: 13 MMOL/L (ref 8–16)
AST SERPL-CCNC: 23 U/L (ref 10–40)
BACTERIA #/AREA URNS AUTO: ABNORMAL /HPF
BASOPHILS # BLD AUTO: 0.05 K/UL (ref 0–0.2)
BASOPHILS NFR BLD: 0.4 % (ref 0–1.9)
BILIRUB SERPL-MCNC: 0.3 MG/DL (ref 0.1–1)
BILIRUB UR QL STRIP: NEGATIVE
BUN SERPL-MCNC: 35 MG/DL (ref 6–20)
CALCIUM SERPL-MCNC: 10.2 MG/DL (ref 8.7–10.5)
CHLORIDE SERPL-SCNC: 112 MMOL/L (ref 95–110)
CLARITY UR REFRACT.AUTO: ABNORMAL
CO2 SERPL-SCNC: 15 MMOL/L (ref 23–29)
COLOR UR AUTO: ABNORMAL
CREAT SERPL-MCNC: 1.6 MG/DL (ref 0.5–1.4)
CTP QC/QA: YES
DIFFERENTIAL METHOD: ABNORMAL
EOSINOPHIL # BLD AUTO: 0.1 K/UL (ref 0–0.5)
EOSINOPHIL NFR BLD: 0.6 % (ref 0–8)
ERYTHROCYTE [DISTWIDTH] IN BLOOD BY AUTOMATED COUNT: 20.3 % (ref 11.5–14.5)
EST. GFR  (AFRICAN AMERICAN): 40.9 ML/MIN/1.73 M^2
EST. GFR  (NON AFRICAN AMERICAN): 35.5 ML/MIN/1.73 M^2
GLUCOSE SERPL-MCNC: 117 MG/DL (ref 70–110)
GLUCOSE UR QL STRIP: NEGATIVE
HCT VFR BLD AUTO: 42.3 % (ref 37–48.5)
HGB BLD-MCNC: 12.2 G/DL (ref 12–16)
HGB UR QL STRIP: ABNORMAL
HYALINE CASTS UR QL AUTO: 0 /LPF
IMM GRANULOCYTES # BLD AUTO: 0.07 K/UL (ref 0–0.04)
IMM GRANULOCYTES NFR BLD AUTO: 0.6 % (ref 0–0.5)
KETONES UR QL STRIP: NEGATIVE
LACTATE SERPL-SCNC: 3.4 MMOL/L (ref 0.5–2.2)
LEUKOCYTE ESTERASE UR QL STRIP: ABNORMAL
LIPASE SERPL-CCNC: 22 U/L (ref 4–60)
LYMPHOCYTES # BLD AUTO: 1.2 K/UL (ref 1–4.8)
LYMPHOCYTES NFR BLD: 9.9 % (ref 18–48)
MCH RBC QN AUTO: 26.9 PG (ref 27–31)
MCHC RBC AUTO-ENTMCNC: 28.8 G/DL (ref 32–36)
MCV RBC AUTO: 93 FL (ref 82–98)
MICROSCOPIC COMMENT: ABNORMAL
MONOCYTES # BLD AUTO: 1.2 K/UL (ref 0.3–1)
MONOCYTES NFR BLD: 10 % (ref 4–15)
NEUTROPHILS # BLD AUTO: 9.3 K/UL (ref 1.8–7.7)
NEUTROPHILS NFR BLD: 78.5 % (ref 38–73)
NITRITE UR QL STRIP: NEGATIVE
NON-SQ EPI CELLS #/AREA URNS AUTO: 3 /HPF
NRBC BLD-RTO: 0 /100 WBC
PH UR STRIP: 6 [PH] (ref 5–8)
PLATELET # BLD AUTO: 314 K/UL (ref 150–350)
PMV BLD AUTO: 11.9 FL (ref 9.2–12.9)
POTASSIUM SERPL-SCNC: 3.8 MMOL/L (ref 3.5–5.1)
PROT SERPL-MCNC: 6.9 G/DL (ref 6–8.4)
PROT UR QL STRIP: ABNORMAL
RBC # BLD AUTO: 4.54 M/UL (ref 4–5.4)
RBC #/AREA URNS AUTO: 12 /HPF (ref 0–4)
SARS-COV-2 RDRP RESP QL NAA+PROBE: NEGATIVE
SODIUM SERPL-SCNC: 140 MMOL/L (ref 136–145)
SP GR UR STRIP: 1.01 (ref 1–1.03)
URN SPEC COLLECT METH UR: ABNORMAL
WBC # BLD AUTO: 11.88 K/UL (ref 3.9–12.7)
WBC #/AREA URNS AUTO: >100 /HPF (ref 0–5)

## 2020-12-08 PROCEDURE — 96375 TX/PRO/DX INJ NEW DRUG ADDON: CPT

## 2020-12-08 PROCEDURE — 80053 COMPREHEN METABOLIC PANEL: CPT

## 2020-12-08 PROCEDURE — U0002 COVID-19 LAB TEST NON-CDC: HCPCS | Performed by: PHYSICIAN ASSISTANT

## 2020-12-08 PROCEDURE — 99285 EMERGENCY DEPT VISIT HI MDM: CPT | Mod: ,,, | Performed by: EMERGENCY MEDICINE

## 2020-12-08 PROCEDURE — 63600175 PHARM REV CODE 636 W HCPCS: Performed by: PHYSICIAN ASSISTANT

## 2020-12-08 PROCEDURE — 96365 THER/PROPH/DIAG IV INF INIT: CPT

## 2020-12-08 PROCEDURE — 81001 URINALYSIS AUTO W/SCOPE: CPT

## 2020-12-08 PROCEDURE — 25000003 PHARM REV CODE 250: Performed by: PHYSICIAN ASSISTANT

## 2020-12-08 PROCEDURE — 25500020 PHARM REV CODE 255: Performed by: EMERGENCY MEDICINE

## 2020-12-08 PROCEDURE — 87040 BLOOD CULTURE FOR BACTERIA: CPT

## 2020-12-08 PROCEDURE — 20600001 HC STEP DOWN PRIVATE ROOM

## 2020-12-08 PROCEDURE — 83605 ASSAY OF LACTIC ACID: CPT

## 2020-12-08 PROCEDURE — 99285 EMERGENCY DEPT VISIT HI MDM: CPT | Mod: 25

## 2020-12-08 PROCEDURE — 99285 PR EMERGENCY DEPT VISIT,LEVEL V: ICD-10-PCS | Mod: ,,, | Performed by: EMERGENCY MEDICINE

## 2020-12-08 PROCEDURE — 85025 COMPLETE CBC W/AUTO DIFF WBC: CPT

## 2020-12-08 PROCEDURE — 63600175 PHARM REV CODE 636 W HCPCS: Performed by: STUDENT IN AN ORGANIZED HEALTH CARE EDUCATION/TRAINING PROGRAM

## 2020-12-08 PROCEDURE — 87086 URINE CULTURE/COLONY COUNT: CPT

## 2020-12-08 PROCEDURE — 96366 THER/PROPH/DIAG IV INF ADDON: CPT

## 2020-12-08 PROCEDURE — 83690 ASSAY OF LIPASE: CPT

## 2020-12-08 RX ORDER — ACETAMINOPHEN 325 MG/1
650 TABLET ORAL EVERY 4 HOURS PRN
Status: DISCONTINUED | OUTPATIENT
Start: 2020-12-08 | End: 2020-12-16 | Stop reason: HOSPADM

## 2020-12-08 RX ORDER — GABAPENTIN 100 MG/1
100 CAPSULE ORAL NIGHTLY
Status: DISCONTINUED | OUTPATIENT
Start: 2020-12-08 | End: 2020-12-16 | Stop reason: HOSPADM

## 2020-12-08 RX ORDER — ONDANSETRON 2 MG/ML
8 INJECTION INTRAMUSCULAR; INTRAVENOUS EVERY 8 HOURS PRN
Status: DISCONTINUED | OUTPATIENT
Start: 2020-12-08 | End: 2020-12-16 | Stop reason: HOSPADM

## 2020-12-08 RX ORDER — SODIUM CHLORIDE, SODIUM LACTATE, POTASSIUM CHLORIDE, CALCIUM CHLORIDE 600; 310; 30; 20 MG/100ML; MG/100ML; MG/100ML; MG/100ML
INJECTION, SOLUTION INTRAVENOUS CONTINUOUS
Status: DISCONTINUED | OUTPATIENT
Start: 2020-12-08 | End: 2020-12-12

## 2020-12-08 RX ORDER — NALOXONE HCL 0.4 MG/ML
0.02 VIAL (ML) INJECTION
Status: DISCONTINUED | OUTPATIENT
Start: 2020-12-08 | End: 2020-12-16 | Stop reason: HOSPADM

## 2020-12-08 RX ORDER — ONDANSETRON 2 MG/ML
4 INJECTION INTRAMUSCULAR; INTRAVENOUS
Status: COMPLETED | OUTPATIENT
Start: 2020-12-08 | End: 2020-12-08

## 2020-12-08 RX ORDER — SODIUM CHLORIDE 0.9 % (FLUSH) 0.9 %
10 SYRINGE (ML) INJECTION
Status: DISCONTINUED | OUTPATIENT
Start: 2020-12-08 | End: 2020-12-16 | Stop reason: HOSPADM

## 2020-12-08 RX ADMIN — ONDANSETRON 4 MG: 2 INJECTION INTRAMUSCULAR; INTRAVENOUS at 05:12

## 2020-12-08 RX ADMIN — SODIUM CHLORIDE 1000 ML: 0.9 INJECTION, SOLUTION INTRAVENOUS at 06:12

## 2020-12-08 RX ADMIN — PIPERACILLIN AND TAZOBACTAM 4.5 G: 4; .5 INJECTION, POWDER, LYOPHILIZED, FOR SOLUTION INTRAVENOUS; PARENTERAL at 06:12

## 2020-12-08 RX ADMIN — SODIUM CHLORIDE, SODIUM LACTATE, POTASSIUM CHLORIDE, AND CALCIUM CHLORIDE: .6; .31; .03; .02 INJECTION, SOLUTION INTRAVENOUS at 11:12

## 2020-12-08 RX ADMIN — IOHEXOL 75 ML: 350 INJECTION, SOLUTION INTRAVENOUS at 08:12

## 2020-12-08 NOTE — ED TRIAGE NOTES
Ileostomy and urostomy bag placed on Friday since then she had n/v. Nausea med has not helped. Pt reports abd pain. Dull pain on right side.Pt reports dizziness weakness. Pt is aox4.

## 2020-12-08 NOTE — ED PROVIDER NOTES
Encounter Date: 12/8/2020       History     Chief Complaint   Patient presents with    Abdominal Pain     Pt report n/v since friday-- colostomy placed on friday . Zofran given by ems. No actively vomitting that this time.      Ms Steve is a 55yoF presents for chills and nausea vomiting; pertinent PMHx cervical cancer s/p radiation resulting in ureteral strictures-->b/l nephrostomy tube placement-->transverse colon urinary conduit to LLQ-->hospital stay complicated by a post-operative bowel perforation now with a RLQ end ileostomy.  Patient was discharged from LTAC after 52 day stay and reports she has been doing poorly at home.  Onset of chills shortly after leaving LTAC with continuous nausea vomiting, she has not eaten in several days.  Able to keep down small amounts of liquids.  Also reports her  has been performing ostomy care reports burning to skin of abdomen.  She feels generally weak and dehydrated.  She denies chest pain or difficulty breathing.  The patients available PMH, PSH, Social History, medications, allergies, and triage vital signs were reviewed just prior to their medical evaluation.  A ten point review of systems was completed and is negative except as documented above.  Patient denies any other acute medical complaint.    Please be advised this text was dictated with [employer] software and may contain errors due to translation.           Review of patient's allergies indicates:   Allergen Reactions    Bee sting [allergen ext-venom-honey bee]      Rash      Grass pollen-bermuda, standard      rash     Past Medical History:   Diagnosis Date    Abnormal mammogram 8/25/2020    Anxiety     Cervical cancer 2014    Chronic back pain     Depression     Diarrhea due to malabsorption 11/14/2018    Fibromyalgia     Generalized abdominal pain 8/25/2020    GERD (gastroesophageal reflux disease)     Hiatal hernia 2014    History of cervical cancer 10/11/2018    History of cervical  cancer 10/11/2018    Hx of psychiatric care     Cymbalta, trazodone    Hypertension     Hypomagnesemia 11/21/2018    Lactose intolerance     Metastatic squamous cell carcinoma to lymph node 10/11/2018    Nephrostomy tube displaced     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 drug 11/14/2018    Osteoarthritis of back     Psychiatric problem     Stroke 1972     Past Surgical History:   Procedure Laterality Date    ANTEGRADE NEPHROSTOGRAPHY Bilateral 9/28/2020    Procedure: Nephrostogram - antegrade;  Surgeon: Leslie Balbuena MD;  Location: [facility] OR [address] [place] [address];  Service: Urology;  Laterality: Bilateral;    BILATERAL OOPHORECTOMY  2015    CHOLECYSTECTOMY  11/09/2016    Done at [facility], path showed chronic cholecystitis and gallstones    cold knife conization  2014    COLECTOMY, RIGHT  9/17/2020    Procedure: COLECTOMY, RIGHT Extended;  Surgeon: Hammad Reynolds MD;  Location: [facility] OR 2ND FLR;  Service: Colon and Rectal;;    COLONOSCOPY  2014    COLONOSCOPY N/A 10/24/2016    at [facility], Dr Gutiérrez    COLONOSCOPY N/A 5/18/2018    Procedure: COLONOSCOPY;  Surgeon: Arden Gutiérrez MD;  Location: [facility] ([address] [place] [address]);  Service: Endoscopy;  Laterality: N/A;    COLONOSCOPY N/A 7/28/2020    Procedure: COLONOSCOPY;  Surgeon: Hammad Reynolds MD;  Location: [facility] (4TH FLR);  Service: Colon and Rectal;  Laterality: N/A;  covid test [place] 7/25    CYSTOSCOPY WITH URETEROSCOPY, RETROGRADE PYELOGRAPHY, AND INSERTION OF STENT Bilateral 3/21/2020    Procedure: CYSTOSCOPY, WITH RETROGRADE PYELOGRAM,;  Surgeon: Leslie Balbuena MD;  Location: [facility] OR [address] [place] [address];  Service: Urology;  Laterality: Bilateral;    ESOPHAGOGASTRODUODENOSCOPY  2014    ESOPHAGOGASTRODUODENOSCOPY  11/18/2020    ESOPHAGOGASTRODUODENOSCOPY N/A 11/18/2020    Procedure: ESOPHAGOGASTRODUODENOSCOPY (EGD);  Surgeon: Zenon Spencer MD;  Location: [facility];  Service: Endoscopy;  Laterality: N/A;    HYSTERECTOMY  2014    [facility] for cervical cancer     ILEOSTOMY  9/17/2020    Procedure: CREATION, ILEOSTOMY;  Surgeon: Hammad Reynolds MD;  Location: [facility] OR 2ND FLR;  Service: Colon and Rectal;;    MOBILIZATION OF SPLENIC FLEXURE N/A 9/11/2020    Procedure: MOBILIZATION, COLONIC;  Surgeon: Hammad Reynolds MD;  Location: [facility] OR 2ND FLR;  Service: Colon and Rectal;  Laterality: N/A;    OOPHORECTOMY      RETROPERITONEAL LYMPHADENECTOMY Right 9/17/2018    Procedure: LYMPHADENECTOMY, RETROPERITONEUM;  Surgeon: Sebas Reed MD;  Location: [facility] OR 2ND FLR;  Service: General;  Laterality: Right;  ILIAC     Family History   Problem Relation Age of Onset    Heart disease Sister     Heart disease Maternal Grandmother     Colon cancer Father     Esophageal cancer Father     Cancer Father         Lung-smoker    Cancer Mother         Cervical    Cervical cancer Mother     Breast cancer Maternal Aunt     Suicide Daughter         jumped from parking structure    Drug abuse Daughter     Drug abuse Daughter     Rectal cancer Neg Hx     Stomach cancer Neg Hx     Crohn's disease Neg Hx     Ulcerative colitis Neg Hx     Diabetes Neg Hx     Hypertension Neg Hx      Social History     Tobacco Use    Smoking status: Former Smoker    Smokeless tobacco: Never Used   Substance Use Topics    Alcohol use: No     Alcohol/week: 0.0 standard drinks    Drug use: No     Review of Systems   Constitutional: Positive for chills. Negative for fever.   HENT: Negative for facial swelling, sore throat and trouble swallowing.    Eyes: Negative for visual disturbance.   Respiratory: Negative for shortness of breath.    Cardiovascular: Negative for chest pain.   Gastrointestinal: Positive for nausea and vomiting. Negative for abdominal pain (denies), constipation and diarrhea.   Genitourinary: Negative for flank pain.   Musculoskeletal: Negative for back pain.   Skin: Positive for rash.   Neurological: Positive for weakness (generalized). Negative for syncope.   Hematological: Does  not bruise/bleed easily.   Psychiatric/Behavioral: Negative for confusion.       Physical Exam     Initial Vitals [12/08/20 1414]   BP Pulse Resp Temp SpO2   136/86 87 16 98 °F (36.7 °C) 100 %      MAP       --         Physical Exam    Nursing note and vitals reviewed.  Constitutional: She appears well-developed and well-nourished. She is not diaphoretic. She appears distressed (Actively vomiting).   Appears older than stated age, chronically ill-appearing   HENT:   Head: Normocephalic and atraumatic.   Right Ear: External ear normal.   Left Ear: External ear normal.   Mouth/Throat: Oropharynx is clear and moist.   Eyes: Conjunctivae and EOM are normal.   Cardiovascular: Regular rhythm and intact distal pulses.   Tachycardic 130s on exam   Pulmonary/Chest: Breath sounds normal. No respiratory distress. She has no wheezes. She has no rhonchi. She has no rales.   Abdominal: Soft. Bowel sounds are normal. There is no abdominal tenderness.   Colostomy right lower quadrant, ileal conduit left lower quadrant   Musculoskeletal: Normal range of motion. No edema.   Neurological: She is alert and oriented to person, place, and time. She has normal strength. No cranial nerve deficit or sensory deficit.   Skin: Skin is warm and dry. Capillary refill takes less than 2 seconds. Rash noted. There is erythema. No pallor.   Ostomy bags were significantly loose with stool and urine all over abdomen  Under the skin is erythematous, eroded and tender   Psychiatric: She has a normal mood and affect. Her behavior is normal. Judgment and thought content normal.   Patient is withdrawn with some mild psychomotor slowing             ED Course   Procedures  Labs Reviewed   CBC W/ AUTO DIFFERENTIAL - Abnormal; Notable for the following components:       Result Value    MCH 26.9 (*)     MCHC 28.8 (*)     RDW 20.3 (*)     Immature Granulocytes 0.6 (*)     Gran # (ANC) 9.3 (*)     Immature Grans (Abs) 0.07 (*)     Mono # 1.2 (*)     Gran % 78.5  (*)     Lymph % 9.9 (*)     All other components within normal limits   LACTIC ACID, PLASMA - Abnormal; Notable for the following components:    Lactate (Lactic Acid) 3.4 (*)     All other components within normal limits   URINALYSIS, REFLEX TO URINE CULTURE - Abnormal; Notable for the following components:    Appearance, UA Cloudy (*)     Protein, UA 2+ (*)     Occult Blood UA 1+ (*)     Leukocytes, UA 3+ (*)     All other components within normal limits    Narrative:     Specimen Source->Urine   URINALYSIS MICROSCOPIC - Abnormal; Notable for the following components:    RBC, UA 12 (*)     WBC, UA >100 (*)     Bacteria Many (*)     Non-Squam Epith 3 (*)     All other components within normal limits    Narrative:     Specimen Source->Urine   COMPREHENSIVE METABOLIC PANEL - Abnormal; Notable for the following components:    Chloride 112 (*)     CO2 15 (*)     Glucose 117 (*)     BUN 35 (*)     Creatinine 1.6 (*)     Albumin 2.9 (*)     Alkaline Phosphatase 236 (*)     eGFR if  40.9 (*)     eGFR if non  35.5 (*)     All other components within normal limits   CULTURE, URINE   LIPASE   SARS-COV-2 RDRP GENE          Imaging Results          CT Abdomen Pelvis With Contrast (Final result)  Result time 12/08/20 20:50:34    Final result by Sebas Bermeo MD (12/08/20 20:50:34)                 Impression:      No acute abnormality identified in the abdomen or pelvis.    Bilateral femoral vein and left iliac vein filling defects suggestive of DVTs, though mildly improved when compared with 11/04/2020.    Stable bilateral hydroureteronephrosis.  Postoperative changes of left lower quadrant transverse colon urinary conduit and right lower quadrant ileostomy.    Additional stable findings discussed in the body of the report.      Electronically signed by: Sebas Bermeo MD  Date:    12/08/2020  Time:    20:50             Narrative:    EXAMINATION:  CT ABDOMEN PELVIS WITH CONTRAST    CLINICAL  HISTORY:  Abdominal pain, fever, post-op;    TECHNIQUE:  Low dose axial images, sagittal and coronal reformations were obtained from the lung bases to the pubic symphysis following the IV administration of 75 mL of Omnipaque 350 .  Oral contrast was not given.    COMPARISON:  CT abdomen and pelvis, 11/20/2020 and 11/04/2020.    FINDINGS:  Lower Chest:    Probable linear subsegmental atelectasis versus scarring in the right lower lobe.  Minimal reticulonodular densities in the left lower lobe.  No focal consolidation.  Heart size is normal.  No pleural or pericardial effusion.    Abdomen:    Evaluation is limited by extensive streak artifact due to the patient's arms overlying the field of view.    Liver is unremarkable.  Gallbladder appears surgically absent.  No intrahepatic biliary ductal dilatation.    Spleen is not enlarged.  Adrenal glands are unremarkable.  Pancreas is unremarkable.    Kidneys enhance symmetrically.  There is mild to moderate bilateral hydroureteronephrosis similar to the prior study.  No asymmetric perinephric fat stranding or perinephric fluid collections.  Small simple appearing cyst is present in the lower pole of the left kidney.    No small bowel obstruction.  Bowel appears similar when compared with the prior study.  Postoperative changes of right lower quadrant ileostomy and left lower quadrant transverse colon urinary conduit.  Postoperative changes of right hemicolectomy.    Previously seen fluid collection adjacent to the urinary bladder appears to have resolved.  No pneumoperitoneum or organized fluid collection.    No bulky lymphadenopathy.    Abdominal aorta is normal in caliber with mild atherosclerosis.    Portal, splenic, and superior mesenteric veins are patent.  There are small filling defects identified in the bilateral femoral veins and left iliac vein suggestive of thrombosis, though overall improved when compared with 11/04/2020.    Pelvis:    Urinary bladder is  decompressed and not well evaluated.  Air is present in the vaginal cuff.  Uterus appears surgically absent.  Rectum is unremarkable.  No significant pelvic free fluid.    Bones and soft tissues:    No aggressive osseous lesions.  Postoperative changes of left lower quadrant transverse colon urinary conduit and right lower quadrant ileostomy.  Patchy subcutaneous infiltration of the right lower quadrant abdominal wall which may be related to medical injections.  Mild skin thickening of the abdominal wall.  No acute finding in the extraperitoneal soft tissues.                                 Medical Decision Making:   History:   Old Medical Records: I decided to obtain old medical records.  Old Records Summarized: records from clinic visits and records from previous admission(s).  Initial Assessment:   Patient recently discharged from LTAC presents for nausea vomiting, tachycardia and poor ostomy care, BP stable, afebrile  Differential Diagnosis:   DDx dehydration/hypovolemia, electrolyte disturbance, localized skin reaction vs early cellulitis, SBO. Physical exam and history taking lower clinical suspicion for shock, GI bleed.  Clinical Tests:   Lab Tests: Ordered and Reviewed  Radiological Study: Ordered  Sepsis Perfusion Assessment: I attest, a sepsis perfusion exam was performed within 6 hours of Septic Shock presentation, following fluid resuscitation.  ED Management:  Given IV fluids, Zofran, Zosyn.  Discussed with colorectal (surgery team) he will see the patient at the bedside.  Lactic 3.4, continue fluid resuscitation.  Urine from ileal loop shows 3+ leukocytes, significant wbc's and many bacteria.  Update:  Admitted to Colorectal surgery. Patient agreed to plan of care and voiced understanding.    Danita Duran PA-C  12/09/2020    I discussed the following case, diagnosis and plan of care with attending physician.                Attending Attestation:     Physician Attestation Statement for NP/PA:   I  have conducted a face to face encounter with this patient in addition to the NP/PA, due to Medical Complexity          Attending ED Notes:   Patient presenting with recent ileostomy and urostomy.  At the time presentation, patient had no ostomy bags in place and was covered in stool and urine.  Abdominal wall has significant skin breakdown.  Possible overlying cellulitis.  Patient is a mild DOESSA.  Urinalysis obtained from catheterizing the stoma.  This appears infected.  IV fluid resuscitation and broad-spectrum antibiotics initiated.  Patient is a significantly elevated lactic acid as well.  Will need further resuscitation.  She will require hospitalization.  The patient's care at this time also seems to be beyond the 's ability to care for her at home.                    Clinical Impression:     ICD-10-CM ICD-9-CM   1. Hypovolemia  E86.1 276.52   2. Skin irritation  R23.8 709.9   3. Colostomy care  Z43.3 V55.3   4. Status post ileal conduit  Z93.6 V44.6   5. Tachycardia  R00.0 785.0                      Disposition:   Disposition: Admitted  Condition: Fair     ED Disposition Condition    Admit                             Danita Duran PA-C  12/09/20 1538

## 2020-12-09 PROBLEM — R62.7 FTT (FAILURE TO THRIVE) IN ADULT: Status: ACTIVE | Noted: 2020-12-09

## 2020-12-09 PROBLEM — I82.409 ACUTE DEEP VEIN THROMBOSIS (DVT) OF LOWER EXTREMITY: Status: ACTIVE | Noted: 2020-12-09

## 2020-12-09 PROBLEM — N73.9 PELVIC ABSCESS IN FEMALE: Status: ACTIVE | Noted: 2020-12-09

## 2020-12-09 LAB
ABO + RH BLD: NORMAL
ANION GAP SERPL CALC-SCNC: 12 MMOL/L (ref 8–16)
ANISOCYTOSIS BLD QL SMEAR: SLIGHT
BACTERIA UR CULT: NORMAL
BACTERIA UR CULT: NORMAL
BASOPHILS # BLD AUTO: 0.05 K/UL (ref 0–0.2)
BASOPHILS NFR BLD: 0.5 % (ref 0–1.9)
BLD GP AB SCN CELLS X3 SERPL QL: NORMAL
BUN SERPL-MCNC: 32 MG/DL (ref 6–20)
CALCIUM SERPL-MCNC: 10.4 MG/DL (ref 8.7–10.5)
CHLORIDE SERPL-SCNC: 112 MMOL/L (ref 95–110)
CO2 SERPL-SCNC: 18 MMOL/L (ref 23–29)
CREAT SERPL-MCNC: 1.5 MG/DL (ref 0.5–1.4)
CRP SERPL-MCNC: 11.4 MG/L (ref 0–8.2)
DIFFERENTIAL METHOD: ABNORMAL
EOSINOPHIL # BLD AUTO: 0.1 K/UL (ref 0–0.5)
EOSINOPHIL NFR BLD: 1 % (ref 0–8)
ERYTHROCYTE [DISTWIDTH] IN BLOOD BY AUTOMATED COUNT: 20.2 % (ref 11.5–14.5)
EST. GFR  (AFRICAN AMERICAN): 44.3 ML/MIN/1.73 M^2
EST. GFR  (NON AFRICAN AMERICAN): 38.4 ML/MIN/1.73 M^2
GIANT PLATELETS BLD QL SMEAR: PRESENT
GLUCOSE SERPL-MCNC: 100 MG/DL (ref 70–110)
HCT VFR BLD AUTO: 37.7 % (ref 37–48.5)
HGB BLD-MCNC: 11.2 G/DL (ref 12–16)
HYPOCHROMIA BLD QL SMEAR: ABNORMAL
IMM GRANULOCYTES # BLD AUTO: 0.07 K/UL (ref 0–0.04)
IMM GRANULOCYTES NFR BLD AUTO: 0.7 % (ref 0–0.5)
LACTATE SERPL-SCNC: 1.9 MMOL/L (ref 0.5–2.2)
LYMPHOCYTES # BLD AUTO: 1.6 K/UL (ref 1–4.8)
LYMPHOCYTES NFR BLD: 15 % (ref 18–48)
MAGNESIUM SERPL-MCNC: 1.9 MG/DL (ref 1.6–2.6)
MCH RBC QN AUTO: 27.9 PG (ref 27–31)
MCHC RBC AUTO-ENTMCNC: 29.7 G/DL (ref 32–36)
MCV RBC AUTO: 94 FL (ref 82–98)
MONOCYTES # BLD AUTO: 1.5 K/UL (ref 0.3–1)
MONOCYTES NFR BLD: 14.5 % (ref 4–15)
NEUTROPHILS # BLD AUTO: 7.2 K/UL (ref 1.8–7.7)
NEUTROPHILS NFR BLD: 68.3 % (ref 38–73)
NRBC BLD-RTO: 0 /100 WBC
OVALOCYTES BLD QL SMEAR: ABNORMAL
PHOSPHATE SERPL-MCNC: 4.6 MG/DL (ref 2.7–4.5)
PLATELET # BLD AUTO: 404 K/UL (ref 150–350)
PMV BLD AUTO: 11.3 FL (ref 9.2–12.9)
POIKILOCYTOSIS BLD QL SMEAR: SLIGHT
POLYCHROMASIA BLD QL SMEAR: ABNORMAL
POTASSIUM SERPL-SCNC: 3.5 MMOL/L (ref 3.5–5.1)
PREALB SERPL-MCNC: 28 MG/DL (ref 20–43)
RBC # BLD AUTO: 4.02 M/UL (ref 4–5.4)
SODIUM SERPL-SCNC: 142 MMOL/L (ref 136–145)
WBC # BLD AUTO: 10.52 K/UL (ref 3.9–12.7)

## 2020-12-09 PROCEDURE — 99232 SBSQ HOSP IP/OBS MODERATE 35: CPT | Mod: ,,, | Performed by: INTERNAL MEDICINE

## 2020-12-09 PROCEDURE — 94761 N-INVAS EAR/PLS OXIMETRY MLT: CPT

## 2020-12-09 PROCEDURE — 20600001 HC STEP DOWN PRIVATE ROOM

## 2020-12-09 PROCEDURE — 83605 ASSAY OF LACTIC ACID: CPT

## 2020-12-09 PROCEDURE — 99222 PR INITIAL HOSPITAL CARE,LEVL II: ICD-10-PCS | Mod: 24,,, | Performed by: COLON & RECTAL SURGERY

## 2020-12-09 PROCEDURE — 99222 1ST HOSP IP/OBS MODERATE 55: CPT | Mod: 24,,, | Performed by: COLON & RECTAL SURGERY

## 2020-12-09 PROCEDURE — 86140 C-REACTIVE PROTEIN: CPT

## 2020-12-09 PROCEDURE — 63600175 PHARM REV CODE 636 W HCPCS: Performed by: STUDENT IN AN ORGANIZED HEALTH CARE EDUCATION/TRAINING PROGRAM

## 2020-12-09 PROCEDURE — 97530 THERAPEUTIC ACTIVITIES: CPT

## 2020-12-09 PROCEDURE — 97165 OT EVAL LOW COMPLEX 30 MIN: CPT

## 2020-12-09 PROCEDURE — 83735 ASSAY OF MAGNESIUM: CPT

## 2020-12-09 PROCEDURE — 84100 ASSAY OF PHOSPHORUS: CPT

## 2020-12-09 PROCEDURE — 97162 PT EVAL MOD COMPLEX 30 MIN: CPT

## 2020-12-09 PROCEDURE — 80048 BASIC METABOLIC PNL TOTAL CA: CPT

## 2020-12-09 PROCEDURE — 63600175 PHARM REV CODE 636 W HCPCS: Performed by: NURSE PRACTITIONER

## 2020-12-09 PROCEDURE — 25000003 PHARM REV CODE 250: Performed by: STUDENT IN AN ORGANIZED HEALTH CARE EDUCATION/TRAINING PROGRAM

## 2020-12-09 PROCEDURE — 97535 SELF CARE MNGMENT TRAINING: CPT

## 2020-12-09 PROCEDURE — 86850 RBC ANTIBODY SCREEN: CPT

## 2020-12-09 PROCEDURE — 36415 COLL VENOUS BLD VENIPUNCTURE: CPT

## 2020-12-09 PROCEDURE — 85025 COMPLETE CBC W/AUTO DIFF WBC: CPT

## 2020-12-09 PROCEDURE — 99232 PR SUBSEQUENT HOSPITAL CARE,LEVL II: ICD-10-PCS | Mod: ,,, | Performed by: INTERNAL MEDICINE

## 2020-12-09 PROCEDURE — 84134 ASSAY OF PREALBUMIN: CPT

## 2020-12-09 RX ORDER — ENOXAPARIN SODIUM 100 MG/ML
70 INJECTION SUBCUTANEOUS
Status: DISCONTINUED | OUTPATIENT
Start: 2020-12-09 | End: 2020-12-09

## 2020-12-09 RX ADMIN — PIPERACILLIN AND TAZOBACTAM 4.5 G: 4; .5 INJECTION, POWDER, LYOPHILIZED, FOR SOLUTION INTRAVENOUS; PARENTERAL at 11:12

## 2020-12-09 RX ADMIN — SODIUM CHLORIDE, SODIUM LACTATE, POTASSIUM CHLORIDE, AND CALCIUM CHLORIDE 1000 ML: .6; .31; .03; .02 INJECTION, SOLUTION INTRAVENOUS at 06:12

## 2020-12-09 RX ADMIN — PIPERACILLIN AND TAZOBACTAM 4.5 G: 4; .5 INJECTION, POWDER, LYOPHILIZED, FOR SOLUTION INTRAVENOUS; PARENTERAL at 02:12

## 2020-12-09 RX ADMIN — PIPERACILLIN AND TAZOBACTAM 4.5 G: 4; .5 INJECTION, POWDER, LYOPHILIZED, FOR SOLUTION INTRAVENOUS; PARENTERAL at 06:12

## 2020-12-09 RX ADMIN — ENOXAPARIN SODIUM 70 MG: 80 INJECTION SUBCUTANEOUS at 11:12

## 2020-12-09 RX ADMIN — GABAPENTIN 100 MG: 100 CAPSULE ORAL at 08:12

## 2020-12-09 NOTE — SUBJECTIVE & OBJECTIVE
Subjective:     Interval History: no acute events overnite, admitted from ER last night, no n/v, no abd pain    Post-Op Info:  * No surgery found *          Medications:  Continuous Infusions:   lactated ringers 100 mL/hr at 12/08/20 2312     Scheduled Meds:   enoxaparin  70 mg Subcutaneous Q12H    gabapentin  100 mg Oral QHS    piperacillin-tazobactam (ZOSYN) IVPB  4.5 g Intravenous Q8H     PRN Meds:   acetaminophen    naloxone    ondansetron    promethazine (PHENERGAN) IVPB    sodium chloride 0.9%        Objective:     Vital Signs (Most Recent):  Temp: 97.4 °F (36.3 °C) (12/09/20 0931)  Pulse: 102 (12/09/20 0931)  Resp: 16 (12/09/20 0931)  BP: 134/70 (12/09/20 0931)  SpO2: 100 % (12/09/20 0931) Vital Signs (24h Range):  Temp:  [96.6 °F (35.9 °C)-98 °F (36.7 °C)] 97.4 °F (36.3 °C)  Pulse:  [] 102  Resp:  [16-20] 16  SpO2:  [97 %-100 %] 100 %  BP: ()/(53-86) 134/70     Intake/Output - Last 3 Shifts       12/07 0700 - 12/08 0659 12/08 0700 - 12/09 0659 12/09 0700 - 12/10 0659    P.O.  0     I.V. (mL/kg)  200 (2.8)     IV Piggyback  2200     Total Intake(mL/kg)  2400 (33.9)     Urine (mL/kg/hr)  1     Emesis/NG output  0     Stool  1     Total Output  2     Net  +2398            Urine Occurrence  1 x     Stool Occurrence  0 x     Emesis Occurrence  1 x           Physical Exam  Vitals signs and nursing note reviewed.   Constitutional:       Appearance: She is well-developed. She is not ill-appearing.   HENT:      Head: Normocephalic.   Eyes:      Pupils: Pupils are equal, round, and reactive to light.   Cardiovascular:      Rate and Rhythm: Normal rate and regular rhythm.      Heart sounds: Normal heart sounds.   Pulmonary:      Effort: Pulmonary effort is normal. No respiratory distress.      Breath sounds: Normal breath sounds. No wheezing or rales.   Abdominal:      Palpations: Abdomen is soft. There is no mass.      Tenderness: There is no guarding or rebound.      Comments: Stoma and  urostomy functional  Inc line healing    Genitourinary:     Comments: aleah neprh draining   Skin:     General: Skin is warm and dry.   Neurological:      Mental Status: She is alert and oriented to person, place, and time.   Psychiatric:         Behavior: Behavior normal.         Thought Content: Thought content normal.         Judgment: Judgment normal.             Significant Labs:  BMP (Last 3 Results):   Recent Labs   Lab 12/03/20  0424 12/04/20  0442 12/08/20  2033 12/09/20  0407    96 117* 100   * 141 140 142   K 4.0 3.5 3.8 3.5   * 113* 112* 112*   CO2 17* 20* 15* 18*   BUN 37* 36* 35* 32*   CREATININE 1.1 1.2 1.6* 1.5*   CALCIUM 10.7* 10.9* 10.2 10.4   MG 1.8 1.8  --  1.9     CBC (Last 3 Results):   Recent Labs   Lab 12/04/20  0442 12/08/20  1701 12/09/20  0407   WBC 5.60 11.88 10.52   RBC 3.63* 4.54 4.02   HGB 9.7* 12.2 11.2*   HCT 34.5* 42.3 37.7    314 404*   MCV 95 93 94   MCH 26.7* 26.9* 27.9   MCHC 28.1* 28.8* 29.7*       Significant Diagnostics:  CT 12/8  No acute abnormality identified in the abdomen or pelvis.     Bilateral femoral vein and left iliac vein filling defects suggestive of DVTs, though mildly improved when compared with 11/04/2020.     Stable bilateral hydroureteronephrosis.  Postoperative changes of left lower quadrant transverse colon urinary conduit and right lower quadrant ileostomy.

## 2020-12-09 NOTE — SUBJECTIVE & OBJECTIVE
Past Medical History:   Diagnosis Date    Abnormal mammogram 8/25/2020    Anxiety     Cervical cancer 2014    Chronic back pain     Depression     Diarrhea due to malabsorption 11/14/2018    Fibromyalgia     Generalized abdominal pain 8/25/2020    GERD (gastroesophageal reflux disease)     Hiatal hernia 2014    History of cervical cancer 10/11/2018    History of cervical cancer 10/11/2018    Hx of psychiatric care     Cymbalta, trazodone    Hypertension     Hypomagnesemia 11/21/2018    Lactose intolerance     Metastatic squamous cell carcinoma to lymph node 10/11/2018    Nephrostomy tube displaced     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 drug 11/14/2018    Osteoarthritis of back     Psychiatric problem     Stroke 1972       Past Surgical History:   Procedure Laterality Date    ANTEGRADE NEPHROSTOGRAPHY Bilateral 9/28/2020    Procedure: Nephrostogram - antegrade;  Surgeon: Leslie Balbuena MD;  Location: [facility] OR 1ST [facility];  Service: Urology;  Laterality: Bilateral;    BILATERAL OOPHORECTOMY  2015    CHOLECYSTECTOMY  11/09/2016    Done at [facility], path showed chronic cholecystitis and gallstones    cold knife conization  2014    COLECTOMY, RIGHT  9/17/2020    Procedure: COLECTOMY, RIGHT Extended;  Surgeon: Hammad Reynolds MD;  Location: [facility] OR 2ND FLR;  Service: Colon and Rectal;;    COLONOSCOPY  2014    COLONOSCOPY N/A 10/24/2016    at [facility], Dr Gutiérrez    COLONOSCOPY N/A 5/18/2018    Procedure: COLONOSCOPY;  Surgeon: Arden Gutiérrez MD;  Location: [facility] (4TH FLR);  Service: Endoscopy;  Laterality: N/A;    COLONOSCOPY N/A 7/28/2020    Procedure: COLONOSCOPY;  Surgeon: Hammad Reynolds MD;  Location: [facility] (4TH FLR);  Service: Colon and Rectal;  Laterality: N/A;  covid test [place] 7/25    CYSTOSCOPY WITH URETEROSCOPY, RETROGRADE PYELOGRAPHY, AND INSERTION OF STENT Bilateral 3/21/2020    Procedure: CYSTOSCOPY, WITH RETROGRADE PYELOGRAM,;  Surgeon: Leslie Balbuena MD;  Location:  [facility] OR 1ST FLR;  Service: Urology;  Laterality: Bilateral;    ESOPHAGOGASTRODUODENOSCOPY  2014    ESOPHAGOGASTRODUODENOSCOPY  11/18/2020    ESOPHAGOGASTRODUODENOSCOPY N/A 11/18/2020    Procedure: ESOPHAGOGASTRODUODENOSCOPY (EGD);  Surgeon: Zenon Spencer MD;  Location: [facility];  Service: Endoscopy;  Laterality: N/A;    HYSTERECTOMY  2014    [facility] for cervical cancer    ILEOSTOMY  9/17/2020    Procedure: CREATION, ILEOSTOMY;  Surgeon: Hammad Reynolds MD;  Location: [facility] OR 2ND FLR;  Service: Colon and Rectal;;    MOBILIZATION OF SPLENIC FLEXURE N/A 9/11/2020    Procedure: MOBILIZATION, COLONIC;  Surgeon: Hammad Reynolds MD;  Location: [facility] OR 2ND FLR;  Service: Colon and Rectal;  Laterality: N/A;    OOPHORECTOMY      RETROPERITONEAL LYMPHADENECTOMY Right 9/17/2018    Procedure: LYMPHADENECTOMY, RETROPERITONEUM;  Surgeon: Sebas Reed MD;  [facility];  Service: General;  Laterality: Right;  ILIAC       Review of patient's allergies indicates:   Allergen Reactions    Bee sting [allergen ext-venom-honey bee]      Rash      Grass pollen-bermuda, standard      rash       Family History     Problem Relation (Age of Onset)    Breast cancer Maternal Aunt    Cancer Father, Mother    Cervical cancer Mother    Colon cancer Father    Drug abuse Daughter, Daughter    Esophageal cancer Father    Heart disease Sister, Maternal Grandmother    Suicide Daughter          Tobacco Use    Smoking status: Former Smoker    Smokeless tobacco: Never Used   Substance and Sexual Activity    Alcohol use: No     Alcohol/week: 0.0 standard drinks    Drug use: No    Sexual activity: Yes     Partners: Male     Birth control/protection: None     Comment:  19 years since 1999       Review of Systems   Constitutional: Positive for fatigue. Negative for chills and fever.   HENT: Negative for trouble swallowing.    Eyes: Negative for visual disturbance.   Respiratory: Negative for shortness of breath.     Cardiovascular: Negative for chest pain.   Gastrointestinal: Positive for nausea and vomiting.   Genitourinary: Negative for decreased urine volume, flank pain and hematuria.   Musculoskeletal: Negative for myalgias.   Skin: Negative for wound.   Neurological: Negative for weakness.   Hematological: Does not bruise/bleed easily.   Psychiatric/Behavioral: Negative for sleep disturbance.       Objective:     Temp:  [96.6 °F (35.9 °C)-98 °F (36.7 °C)] 96.9 °F (36.1 °C)  Pulse:  [] 105  Resp:  [16-20] 18  SpO2:  [97 %-100 %] 100 %  BP: ()/(53-86) 103/68     Body mass index is 23.04 kg/m².           Drains     Drain                 Urostomy 09/11/20 ileal conduit LLQ 89 days         Ileostomy 09/18/20 RLQ 82 days                Physical Exam    Significant Labs:    BMP:  Recent Labs   Lab 12/04/20  0442 12/08/20  2033 12/09/20  0407    140 142   K 3.5 3.8 3.5   * 112* 112*   CO2 20* 15* 18*   BUN 36* 35* 32*   CREATININE 1.2 1.6* 1.5*   CALCIUM 10.9* 10.2 10.4       CBC:  Recent Labs   Lab 12/04/20  0442 12/08/20  1701 12/09/20  0407   WBC 5.60 11.88 10.52   HGB 9.7* 12.2 11.2*   HCT 34.5* 42.3 37.7    314 404*       Urine Studies:   Recent Labs   Lab 12/08/20  1845   COLORU [place]   APPEARANCEUA Cloudy*   PHUR 6.0   SPECGRAV 1.015   PROTEINUA 2+*   GLUCUA Negative   KETONESU Negative   BILIRUBINUA Negative   OCCULTUA 1+*   NITRITE Negative   LEUKOCYTESUR 3+*   RBCUA 12*   WBCUA >100*   BACTERIA Many*   HYALINECASTS 0       Significant Imaging:  All pertinent imaging results/findings from the past 24 hours have been reviewed.

## 2020-12-09 NOTE — ASSESSMENT & PLAN NOTE
[facility] 55 [facility] female with history of cervical cancer s/p radiation with resulting BL ureteral strictures s/p BL nephrostomy tube placement. On 9/11/20 she underwent creation of a transverse colon urinary conduit (Dr. Reynolds, Dr. Balbuena). Her post-operative course was complicated by a bowel perforation s/p extended right hemicolectomy and ileostomy creation (9/17/20). Subsequently she required IR drainage of a pelvic collection (9/23/20) and longterm IV antibiotics/antifungals. She was discharged from the hospital to an LTAC on 10/13/20, was in LTC for almost 60 days, home for 4 days, family having difficulty caring for pt, not eating at home and difficulty keeping appliance over ileostomy    Consult wound care  PT/OT  Calorie count

## 2020-12-09 NOTE — H&P
[facility]  Colorectal Surgery  History & Physical    Patient Name: Edita Riley  MRN: [id number]  Admission Date: 12/8/2020  Attending Physician: Hammad Reynolds MD  Primary Care Provider: Primary Doctor No    Subjective:     Chief Complaint/Reason for Admission: Nausea, emesis    History of Present Illness: 55F with PMH cervical cancer s/p radiation with resulting BL ureteral strictures s/p BL nephrostomy tube placement. On 9/11/20 she underwent creation of a transverse colon urinary conduit (Dr. Reynolds, Dr. Balbuena). Her post-operative course was complicated by a bowel perforation s/p extended right hemicolectomy and ileostomy creation (9/17/20). Subsequently she required IR drainage of a pelvic collection (9/23/20) and longterm IV antibiotics/antifungals. She was discharged from the hospital to an LTAC on 10/13/20. In the interval since her discharge she was started on therapeutic anticoagulation for LE DVT. She also underwent an EGD for hyperemesis and was found to have a mild Schatzki ring requiring dilation. She has completed all of her antibiotic/antifungals as of 12/1. She was discharged to home on Friday 12/4. Since then she has had worsening nausea and emesis with inability to tolerate PO. She also has significant skin irritation around both her ieostomy and her urostomy and difficulty pouching the stomas so that they do not leak. She denies any significant abdominal pain or distention. Her ostomy is still functioning with normal output. She denies fever but notes chills. She denies CP, SOB.    Review of patient's allergies indicates:   Allergen Reactions    Bee sting [allergen ext-venom-honey bee]      Rash      Grass pollen-bermuda, standard      rash       Past Medical History:   Diagnosis Date    Abnormal mammogram 8/25/2020    Anxiety     Cervical cancer 2014    Chronic back pain     Depression     Diarrhea due to malabsorption 11/14/2018    Fibromyalgia      Generalized abdominal pain 8/25/2020    GERD (gastroesophageal reflux disease)     Hiatal hernia 2014    History of cervical cancer 10/11/2018    History of cervical cancer 10/11/2018    Hx of psychiatric care     Cymbalta, trazodone    Hypertension     Hypomagnesemia 11/21/2018    Lactose intolerance     Metastatic squamous cell carcinoma to lymph node 10/11/2018    Nephrostomy tube displaced     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 drug 11/14/2018    Osteoarthritis of back     Psychiatric problem     Stroke 1972     Past Surgical History:   Procedure Laterality Date    ANTEGRADE NEPHROSTOGRAPHY Bilateral 9/28/2020    Procedure: Nephrostogram - antegrade;  Surgeon: Leslie Balbuena MD;  Location: [facility] OR 1ST FLR;  Service: Urology;  Laterality: Bilateral;    BILATERAL OOPHORECTOMY  2015    CHOLECYSTECTOMY  11/09/2016    Done at [facility], path showed chronic cholecystitis and gallstones    cold knife conization  2014    COLECTOMY, RIGHT  9/17/2020    Procedure: COLECTOMY, RIGHT Extended;  Surgeon: Hammad Reynolds MD;  Location: [facility] OR 2ND FLR;  Service: Colon and Rectal;;    COLONOSCOPY  2014    COLONOSCOPY N/A 10/24/2016    at [facility], Dr Gutiérrez    COLONOSCOPY N/A 5/18/2018    Procedure: COLONOSCOPY;  Surgeon: Arden Gutiérrez MD;  Location: [facility] ENDO (4TH FLR);  Service: Endoscopy;  Laterality: N/A;    COLONOSCOPY N/A 7/28/2020    Procedure: COLONOSCOPY;  Surgeon: Hammad Reynolds MD;  Location: [facility] ENDO (4TH FLR);  Service: Colon and Rectal;  Laterality: N/A;  covid test [place] 7/25    CYSTOSCOPY WITH URETEROSCOPY, RETROGRADE PYELOGRAPHY, AND INSERTION OF STENT Bilateral 3/21/2020    Procedure: CYSTOSCOPY, WITH RETROGRADE PYELOGRAM,;  Surgeon: Leslie Balbuena MD;  Location: [facility] OR 1ST FLR;  Service: Urology;  Laterality: Bilateral;    ESOPHAGOGASTRODUODENOSCOPY  2014    ESOPHAGOGASTRODUODENOSCOPY  11/18/2020    ESOPHAGOGASTRODUODENOSCOPY N/A 11/18/2020    Procedure:  ESOPHAGOGASTRODUODENOSCOPY (EGD);  Surgeon: Zenon Spencer MD;  Location: [facility] ENDO;  Service: Endoscopy;  Laterality: N/A;    HYSTERECTOMY  2014    [facility] for cervical cancer    ILEOSTOMY  9/17/2020    Procedure: CREATION, ILEOSTOMY;  Surgeon: Hammad Reynolds MD;  Location: [facility] OR 2ND FLR;  Service: Colon and Rectal;;    MOBILIZATION OF SPLENIC FLEXURE N/A 9/11/2020    Procedure: MOBILIZATION, COLONIC;  Surgeon: Hammad Reynolds MD;  Location: [facility] OR 2ND FLR;  Service: Colon and Rectal;  Laterality: N/A;    OOPHORECTOMY      RETROPERITONEAL LYMPHADENECTOMY Right 9/17/2018    Procedure: LYMPHADENECTOMY, RETROPERITONEUM;  Surgeon: Sebas Reed MD;  Location: [facility] OR 2ND FLR;  Service: General;  Laterality: Right;  ILIAC     Family History     Problem Relation (Age of Onset)    Breast cancer Maternal Aunt    Cancer Father, Mother    Cervical cancer Mother    Colon cancer Father    Drug abuse Daughter, Daughter    Esophageal cancer Father    Heart disease Sister, Maternal Grandmother    Suicide Daughter        Tobacco Use    Smoking status: Former Smoker    Smokeless tobacco: Never Used   Substance and Sexual Activity    Alcohol use: No     Alcohol/week: 0.0 standard drinks    Drug use: No    Sexual activity: Yes     Partners: Male     Birth control/protection: None     Comment:  19 years since 1999     Review of Systems   Constitutional: Positive for activity change, appetite change and chills. Negative for fever.   HENT: Negative for congestion and sore throat.    Eyes: Negative for pain and redness.   Respiratory: Negative for cough and shortness of breath.    Cardiovascular: Negative for chest pain and palpitations.   Gastrointestinal: Positive for nausea and vomiting. Negative for abdominal distention, abdominal pain and constipation.   Genitourinary: Negative for difficulty urinating and hematuria.   Musculoskeletal: Negative for arthralgias and myalgias.   Skin: Negative for pallor  and wound.   Neurological: Negative for dizziness and syncope.   Psychiatric/Behavioral: Negative for agitation. The patient is not nervous/anxious.      Objective:     Vital Signs (Most Recent):  Temp: 97.7 °F (36.5 °C) (12/08/20 1805)  Pulse: (!) 113 (12/08/20 1805)  Resp: 18 (12/08/20 1805)  BP: 122/75 (12/08/20 1805)  SpO2: 98 % (12/08/20 1805) Vital Signs (24h Range):  Temp:  [97.7 °F (36.5 °C)-98 °F (36.7 °C)] 97.7 °F (36.5 °C)  Pulse:  [] 113  Resp:  [16-18] 18  SpO2:  [98 %-100 %] 98 %  BP: (122-136)/(75-86) 122/75     Weight: 70.8 kg (156 lb)  Body mass index is 23.04 kg/m².    Physical Exam  HENT:      Head: Normocephalic.      Right Ear: External ear normal.      Left Ear: External ear normal.   Eyes:      Extraocular Movements: Extraocular movements intact.   Neck:      Musculoskeletal: Normal range of motion.   Cardiovascular:      Rate and Rhythm: Regular rhythm. Tachycardia present.      Heart sounds: Normal heart sounds.   Pulmonary:      Effort: Pulmonary effort is normal. No respiratory distress.   Abdominal:      General: There is no distension.      Palpations: Abdomen is soft.      Tenderness: There is no abdominal tenderness.      Comments: RLQ ileostomy and LLQ urostomy both pink/viable, significant excoriation of abdominal skin around both ostomy sites R>L    Musculoskeletal: Normal range of motion.   Skin:     General: Skin is warm and dry.   Neurological:      General: No focal deficit present.      Mental Status: She is alert.       Significant Labs:  BMP:   Recent Labs   Lab 12/04/20  0442   GLU 96      K 3.5   *   CO2 20*   BUN 36*   CREATININE 1.2   CALCIUM 10.9*   MG 1.8     CBC:   Recent Labs   Lab 12/08/20  1701   WBC 11.88   RBC 4.54   HGB 12.2   HCT 42.3      MCV 93   MCH 26.9*   MCHC 28.8*     CMP:   Recent Labs   Lab 12/04/20  0442   GLU 96   CALCIUM 10.9*   ALBUMIN 3.2*   PROT 7.7      K 3.5   CO2 20*   *   BUN 36*   CREATININE 1.2   ALKPHOS  272*   ALT 58*   AST 32   BILITOT 0.3     LFTs:   Recent Labs   Lab 12/04/20  0442   ALT 58*   AST 32   ALKPHOS 272*   BILITOT 0.3   PROT 7.7   ALBUMIN 3.2*     Significant Diagnostics:  CT pending    Assessment/Plan:     55F with PMH cervical cancer s/p radiation with resulting BL ureteral strictures s/p BL nephrostomy tube placement. On 9/11/20 she underwent creation of a transverse colon urinary conduit (Dr. Reynolds, Dr. Balbuena). Her post-operative course was complicated by a bowel perforation s/p extended right hemicolectomy and ileostomy creation (9/17/20). Subsequently she required IR drainage of a pelvic collection (9/23/20) and longterm IV antibiotics/antifungals. She presents after recent discharge from LTAC with intractable nausea and emesis, decreased PO intake, and difficulty with pouching of her stomas.   -Admit to Colorectal Surgery  -Pain control  -Monitor vitals, hemodynamics  -Encourage IS  -NPO/IVF resuscitation  -Nausea control as needed  -Monitor UOP  -UA pending however may be colonized as patient as colonic conduit  -Continue zosyn  -Trend WBC, CRP  -Follow up blood cultures  -CT A/P pending to evaluate known fluid collection   -Wound care consult in AM for pouching of BL stomas  -Continue anticoagulation   -Encourage OOB  -PT consult as patient appears deconditioned since previous admission  -Discussed with Dr. Gail Roth MD  Colorectal Surgery  [facility]

## 2020-12-09 NOTE — PLAN OF CARE
Plan of care reviewed with patient questions and concerns addressed and verbalized understanding.  No acute events overnight.  All vital signs stable.  AAOx4.  Safety and fall precautions maintained.  Physically Mobilized to their highest level of functioning.  IVFs LR at 100ml/hr. Very excoriated skin noted around both stomas. Ostomy bags placed this morning; however, leaking around bags noted, not able to make tight seal due to shape of abdomen, folds, skin irritation. Ostomy consult placed by MD. Actively progressing towards goals.  See flow sheet for further information.  Will continue to monitor.

## 2020-12-09 NOTE — HPI
Ms. Riley is a 56 yo F with history of cervical cancer s/p pelvic radiation. She has since developed bilateral ureteral strictures and bilateral hydronephrosis R>L. She had a MAG3 scan confirmed presence of bilateral obstruction. She is s/p open transverse colon conduit urinary diversion on 9/11/2020. Nephrostomy tubes were subsequently removed 10/1. She was then discharged on 10/13 to LTAC, from which she was discharged 12/4. She subsequently presented to the ED at [facility] on 12/8/20 with nausea, vomiting, and dehydration. Lactate was elevated to 3.9. Cr elevated to 1.6 (baseline 1.2.) She has been given fluid resuscitation. Cr slightly improved to 1.2, lactate normalized. Intermittently tachy to 110s as during prior hospitalizations, normotensive, no leukocytosis.  Urology was consulted for evaluation. Patient denies flank pain, fevers, or chills. Says urostomy has been putting out urine. CT AP shows bilateral hydroureteronephrosis stable from  when Cr was at baseline.

## 2020-12-09 NOTE — PLAN OF CARE
Admit Date:  12/8/2020  3:36 PM      Admit Diagnosis:  Hypovolemia [E86.1]  Tachycardia [R00.0]  Skin irritation [R23.8]  Colostomy care [Z43.3]  Status post ileal conduit [Z93.6]    Transferred from:     Past Medical History:   Diagnosis Date    Abnormal mammogram 8/25/2020    Anxiety     Cervical cancer 2014    Chronic back pain     Depression     Diarrhea due to malabsorption 11/14/2018    Fibromyalgia     Generalized abdominal pain 8/25/2020    GERD (gastroesophageal reflux disease)     Hiatal hernia 2014    History of cervical cancer 10/11/2018    History of cervical cancer 10/11/2018    Hx of psychiatric care     Cymbalta, trazodone    Hypertension     Hypomagnesemia 11/21/2018    Lactose intolerance     Metastatic squamous cell carcinoma to lymph node 10/11/2018    Nephrostomy tube displaced     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 drug 11/14/2018    Osteoarthritis of back     Psychiatric problem     Stroke 1972         CM met with patient in room for Discharge Planning Assessment. Assessment was cut short due to the patient's lethargy and shortness of breath. RN at bedside with vital sign machine to assess patient. CM called the patient's  to finish discharge planning assessment; phone is not in service.        12/09/20 1230   Discharge Assessment   Assessment Type Discharge Planning Assessment   Confirmed/corrected address and phone number on facesheet? Yes   Assessment information obtained from? Patient;Medical Record   Expected Length of Stay (days) 3   Communicated expected length of stay with patient/caregiver yes   Prior to hospitilization cognitive status: Alert/Oriented   Prior to hospitalization functional status: Assistive Equipment   Current cognitive status: Alert/Oriented   Current Functional Status: Assistive Equipment   Lives With spouse   Able to Return to Prior Arrangements other (see comments)  (MIKI)   Is patient able to care for self after discharge? Unable to determine at this time  (comments)  (TBD)   Patient's perception of discharge disposition other (comments)  (MIKI)   Readmission Within the Last 30 Days no previous admission in last 30 days   Patient currently being followed by outpatient case management? No   Patient currently receives any other outside agency services? No  (MIKI)   Equipment Currently Used at Home walker, rolling   Do you have any problems affording any of your prescribed medications? No   Is the patient taking medications as prescribed? yes   Does the patient have transportation home? No  (MIKI)   Does the patient receive services at the [facility]? No   Discharge Plan A Home Health   Discharge Plan B Other  (TBD)   DME Needed Upon Discharge  other (see comments)  (TBD)   Patient/Family in Agreement with Plan yes            PCP:  Primary Doctor No  None - patient nodded her head that she sees a PCP. She was unable to vocalize the name due to her being SOB.         Pharmacy:    [facility]/pharmacy #[id number] - [place], [place] - [address] [facility] [facility].  [address]  [place] [address]  Phone: [phone] Fax: [phone]    [facility]/pharmacy #[id number] - [place] - [address]  [address]  [place] [address]  Phone: [phone] Fax: [phone]    [facility]  [address] [id number]  Phone: [phone] Fax: [phone]        Emergency Contacts:  Extended Emergency Contact Information  Primary Emergency Contact: Hammad Riley  Address: [address] [place] [address]  Home Phone: [phone]  Relation: Spouse      Insurance:    Payor: MEDICAID / Plan: [facility] / Product Type: Managed Medicaid /       12/09/2020  12:34 PM    Eladia Staples RN, CM   Ext: [phone]

## 2020-12-09 NOTE — PLAN OF CARE
Problem: Occupational Therapy Goal  Goal: Occupational Therapy Goal  Description: Goals to be met by: 12/23/20     Patient will increase functional independence with ADLs by performing:    Feeding with [place].  UE Dressing with [place].  LE Dressing with Minimal Assistance.  Grooming while standing at sink with Stand-by Assistance.  Toileting from toilet with Minimal Assistance for hygiene and clothing management.   Toilet transfer to toilet with Stand-by Assistance.    Outcome: Ongoing, Progressing     Evaluation complete-see note for details. Goals and POC established.    PEREZ Quevedo  12/9/2020   Pager #: [phone]

## 2020-12-09 NOTE — PROGRESS NOTES
[facility]  Colorectal Surgery  Progress Note    Patient Name: Edita Riley  MRN: [id number]  Admission Date: 12/8/2020  Hospital Length of Stay: 1 days  Attending Physician: Hammad Reynolds MD    Subjective:     Interval History: no acute events overnite, admitted from ER last night, no n/v, no abd pain    Post-Op Info:  * No surgery found *          Medications:  Continuous Infusions:   lactated ringers 100 mL/hr at 12/08/20 2312     Scheduled Meds:   enoxaparin  70 mg Subcutaneous Q12H    gabapentin  100 mg Oral QHS    piperacillin-tazobactam (ZOSYN) IVPB  4.5 g Intravenous Q8H     PRN Meds:   acetaminophen    naloxone    ondansetron    promethazine (PHENERGAN) IVPB    sodium chloride 0.9%        Objective:     Vital Signs (Most Recent):  Temp: 97.4 °F (36.3 °C) (12/09/20 0931)  Pulse: 102 (12/09/20 0931)  Resp: 16 (12/09/20 0931)  BP: 134/70 (12/09/20 0931)  SpO2: 100 % (12/09/20 0931) Vital Signs (24h Range):  Temp:  [96.6 °F (35.9 °C)-98 °F (36.7 °C)] 97.4 °F (36.3 °C)  Pulse:  [] 102  Resp:  [16-20] 16  SpO2:  [97 %-100 %] 100 %  BP: ()/(53-86) 134/70     Intake/Output - Last 3 Shifts       12/07 0700 - 12/08 0659 12/08 0700 - 12/09 0659 12/09 0700 - 12/10 0659    P.O.  0     I.V. (mL/kg)  200 (2.8)     IV Piggyback  2200     Total Intake(mL/kg)  2400 (33.9)     Urine (mL/kg/hr)  1     Emesis/NG output  0     Stool  1     Total Output  2     Net  +2398            Urine Occurrence  1 x     Stool Occurrence  0 x     Emesis Occurrence  1 x           Physical Exam  Vitals signs and nursing note reviewed.   Constitutional:       Appearance: She is well-developed. She is not ill-appearing.   HENT:      Head: Normocephalic.   Eyes:      Pupils: Pupils are equal, round, and reactive to light.   Cardiovascular:      Rate and Rhythm: Normal rate and regular rhythm.      Heart sounds: Normal heart sounds.   Pulmonary:      Effort: Pulmonary effort is normal. No  respiratory distress.      Breath sounds: Normal breath sounds. No wheezing or rales.   Abdominal:      Palpations: Abdomen is soft. There is no mass.      Tenderness: There is no guarding or rebound.      Comments: Stoma and urostomy functional  Inc line healing    Genitourinary:     Comments: aleah neprh draining   Skin:     General: Skin is warm and dry.   Neurological:      Mental Status: She is alert and oriented to person, place, and time.   Psychiatric:         Behavior: Behavior normal.         Thought Content: Thought content normal.         Judgment: Judgment normal.             Significant Labs:  BMP (Last 3 Results):   Recent Labs   Lab 12/03/20  0424 12/04/20  0442 12/08/20  2033 12/09/20  0407    96 117* 100   * 141 140 142   K 4.0 3.5 3.8 3.5   * 113* 112* 112*   CO2 17* 20* 15* 18*   BUN 37* 36* 35* 32*   CREATININE 1.1 1.2 1.6* 1.5*   CALCIUM 10.7* 10.9* 10.2 10.4   MG 1.8 1.8  --  1.9     CBC (Last 3 Results):   Recent Labs   Lab 12/04/20  0442 12/08/20  1701 12/09/20  0407   WBC 5.60 11.88 10.52   RBC 3.63* 4.54 4.02   HGB 9.7* 12.2 11.2*   HCT 34.5* 42.3 37.7    314 404*   MCV 95 93 94   MCH 26.7* 26.9* 27.9   MCHC 28.1* 28.8* 29.7*       Significant Diagnostics:  CT 12/8  No acute abnormality identified in the abdomen or pelvis.     Bilateral femoral vein and left iliac vein filling defects suggestive of DVTs, though mildly improved when compared with 11/04/2020.     Stable bilateral hydroureteronephrosis.  Postoperative changes of left lower quadrant transverse colon urinary conduit and right lower quadrant ileostomy.    Assessment/Plan:     Pelvic abscess in female  b egin zosyn  Monitor vs    Acute deep vein thrombosis (DVT) of lower extremity  Stop eliquis for poss IR  Therapeutic lovenox  Naseem hose  Enc ambulation    FTT (failure to thrive) in adult  Edita Riley is a 55 y.o. female with history of cervical cancer s/p radiation with resulting BL ureteral  strictures s/p BL nephrostomy tube placement. On 9/11/20 she underwent creation of a transverse colon urinary conduit (Dr. Reynolds, Dr. Balbuena). Her post-operative course was complicated by a bowel perforation s/p extended right hemicolectomy and ileostomy creation (9/17/20). Subsequently she required IR drainage of a pelvic collection (9/23/20) and longterm IV antibiotics/antifungals. She was discharged from the hospital to an LTAC on 10/13/20, was in LTC for almost 60 days, home for 4 days, family having difficulty caring for pt, not eating at home and difficulty keeping appliance over ileostomy    Consult wound care  PT/OT  Calorie count      Hypovolemia  Monitor I&O  Monitor vs  Continue IVF    Bilateral ureteral obstruction  aleah nephrostomy tubes functional    Gastroesophageal reflux disease with esophagitis  C/o of feeling food getting stuck in chest  Consult GI  NPO for now    Impaired functional mobility, balance, gait, and endurance  PT/OT    Essential hypertension  Chronic, controlled.  Latest blood pressure and vitals reviewed-   Temp:  [96.6 °F (35.9 °C)-98 °F (36.7 °C)]   Pulse:  []   Resp:  [16-20]   BP: ()/(53-86)   SpO2:  [97 %-100 %] .   Home meds for hypertension were reviewed and noted below. Hospital anti-hypertensive changes were made as shown below.  Hypertension Medications             metoprolol tartrate (LOPRESSOR) 25 MG tablet Take 0.5 tablets (12.5 mg total) by mouth 2 (two) times daily.        Will utilize p.r.n. blood pressure medication only if patient's blood pressure greater than  180/110 and she develops symptoms such as worsening chest pain or shortness of breath.          Kerri Castillo, SARAH  Colorectal Surgery  [facility]

## 2020-12-09 NOTE — CONSULTS
[facility]  Gastroenterology  Consult Note    Patient Name: Edita Riley  MRN: [id number]  Admission Date: 12/8/2020  Hospital Length of Stay: 1 days  Code Status: Full Code   Attending Provider: Hammad Reynolds MD   Consulting Provider: Cassie Mercado MD  Primary Care Physician: Primary Doctor No  Principal Problem:<principal problem not specified>    Inpatient consult to Gastroenterology  Consult performed by: Cassie Mercado MD  Consult ordered by: Kerri Castillo NP        Subjective:     HPI: Edita Riley is a 55 y.o. female with history of cervical cancer s/p radiation with resulting BL ureteral strictures s/p BL nephrostomy tube placement. On 9/11/20 she underwent creation of a transverse colon urinary conduit (Dr. Reynolds, Dr. Balbuena). Her post-operative course was complicated by a bowel perforation s/p extended right hemicolectomy and ileostomy creation (9/17/20). Subsequently she required IR drainage of a pelvic collection (9/23/20) and longterm IV antibiotics/antifungals. She was discharged from the hospital to an LTAC on 10/13/20. In the interval since her discharge she was started on therapeutic anticoagulation for LE DVT. She was discharged home on Friday 12/4.  She reports that she has been having nausea and vomiting.  She reports that she was able to eat, however became dehydrated due to recurrent vomiting.  She reports that prior to that as that rehab, she had a few episodes of emesis, approximately once a week.  This has been worsening since she went home.  She tried some nausea medications which helps.  Denies abdominal pain, increased ostomy output, blood in her stool.  She reports having intermittent dysphagia with a sensation of food being stuck in her chest, more with solids.  It is also associated with odynophagia.  Due to persistent vomiting, the patient became dehydrated and fatigued and that is what brought her to the hospital.  She underwent  an EGD on 11/18 for abdominal pain and was found to have a hiatal hernia but was otherwise normal.  Path showing mild chronic gastritis.  No H pylori.    Upon admission, the patient was afebrile and hemodynamically stable.  Throughout her admission she developed intermittent tachycardia and hypotension.  Labs notable for metabolic acidosis.  GI consulted for intractable nausea and vomiting.      Past Medical History:   Diagnosis Date    Abnormal mammogram 8/25/2020    Anxiety     Cervical cancer 2014    Chronic back pain     Depression     Diarrhea due to malabsorption 11/14/2018    Fibromyalgia     Generalized abdominal pain 8/25/2020    GERD (gastroesophageal reflux disease)     Hiatal hernia 2014    History of cervical cancer 10/11/2018    History of cervical cancer 10/11/2018    Hx of psychiatric care     Cymbalta, trazodone    Hypertension     Hypomagnesemia 11/21/2018    Lactose intolerance     Metastatic squamous cell carcinoma to lymph node 10/11/2018    Nephrostomy tube displaced     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 drug 11/14/2018    Osteoarthritis of back     Psychiatric problem     Stroke 1972       Past Surgical History:   Procedure Laterality Date    ANTEGRADE NEPHROSTOGRAPHY Bilateral 9/28/2020    Procedure: Nephrostogram - antegrade;  Surgeon: Leslie Balbuena MD;  Location: [facility] OR 1ST FLR;  Service: Urology;  Laterality: Bilateral;    BILATERAL OOPHORECTOMY  2015    CHOLECYSTECTOMY  11/09/2016    Done at [facility], path showed chronic cholecystitis and gallstones    cold knife conization  2014    COLECTOMY, RIGHT  9/17/2020    Procedure: COLECTOMY, RIGHT Extended;  Surgeon: Hammad Reynolds MD;  Location: [facility] OR 2ND FLR;  Service: Colon and Rectal;;    COLONOSCOPY  2014    [facility]    COLONOSCOPY N/A 5/18/2018    Procedure: COLONOSCOPY;  Surgeon: Arden Gutiérrez MD;  Location: [facility] (4TH FLR);  Service: Endoscopy;   Laterality: N/A;    COLONOSCOPY N/A 7/28/2020    Procedure: COLONOSCOPY;  Surgeon: Hammad Reynolds MD;  Location: [facility] ENDO (4TH FLR);  Service: Colon and Rectal;  Laterality: N/A;  covid test [place] 7/25    CYSTOSCOPY WITH URETEROSCOPY, RETROGRADE PYELOGRAPHY, AND INSERTION OF STENT Bilateral 3/21/2020    Procedure: CYSTOSCOPY, WITH RETROGRADE PYELOGRAM,;  Surgeon: Leslie Balbuena MD;  Location: [facility] OR 1ST FLR;  Service: Urology;  Laterality: Bilateral;    ESOPHAGOGASTRODUODENOSCOPY  2014    ESOPHAGOGASTRODUODENOSCOPY  11/18/2020    ESOPHAGOGASTRODUODENOSCOPY N/A 11/18/2020    Procedure: ESOPHAGOGASTRODUODENOSCOPY (EGD);  Surgeon: Zenon Spencer MD;  Location: [facility] ENDO;  Service: Endoscopy;  Laterality: N/A;    HYSTERECTOMY  2014    [facility] for cervical cancer    ILEOSTOMY  9/17/2020    Procedure: CREATION, ILEOSTOMY;  Surgeon: Hammad eRynolds MD;  Location: [facility] OR 2ND FLR;  Service: Colon and Rectal;;    MOBILIZATION OF SPLENIC FLEXURE N/A 9/11/2020    Procedure: MOBILIZATION, COLONIC;  Surgeon: Hammad Reynolds MD;  Location: [facility] OR 2ND FLR;  Service: Colon and Rectal;  Laterality: N/A;    OOPHORECTOMY      RETROPERITONEAL LYMPHADENECTOMY Right 9/17/2018    Procedure: LYMPHADENECTOMY, RETROPERITONEUM;  Surgeon: Sebas Reed MD;  Location: [facility] OR 2ND FLR;  Service: General;  Laterality: Right;  ILIAC       Family History   Problem Relation Age of Onset    Heart disease Sister     Heart disease Maternal Grandmother     Colon cancer Father     Esophageal cancer Father     Cancer Father         Lung-smoker    Cancer Mother         Cervical    Cervical cancer Mother     Breast cancer Maternal Aunt     Suicide Daughter         jumped from parking structure    Drug abuse Daughter     Drug abuse Daughter     Rectal cancer Neg Hx     Stomach cancer Neg Hx     Crohn's disease Neg Hx     Ulcerative colitis Neg Hx     Diabetes Neg Hx     Hypertension Neg Hx        Social History      Socioeconomic History    Marital status:      Spouse name: Hammad    Number of children: 2    Years of education: Not on file    Highest education level: Not on file   Occupational History    Not on file   Social Needs    Financial resource strain: Not on file    Food insecurity     Worry: Not on file     Inability: Not on file    Transportation needs     Medical: Not on file     Non-medical: Not on file   Tobacco Use    Smoking status: Former Smoker    Smokeless tobacco: Never Used   Substance and Sexual Activity    Alcohol use: No     Alcohol/week: 0.0 standard drinks    Drug use: No    Sexual activity: Yes     Partners: Male     Birth control/protection: None     Comment:  19 years since    Lifestyle    Physical activity     Days per week: Not on file     Minutes per session: Not on file    Stress: Not on file   Relationships    Social connections     Talks on phone: Not on file     Gets together: Not on file     Attends Christianity service: Not on file     Active member of club or organization: Not on file     Attends meetings of clubs or organizations: Not on file     Relationship status: Not on file   Other Topics Concern    Patient feels they ought to cut down on drinking/drug use Not Asked    Patient annoyed by others criticizing their drinking/drug use Not Asked    Patient has felt bad or guilty about drinking/drug use Not Asked    Patient has had a drink/used drugs as an eye opener in the AM Not Asked   Social History Narrative    , twin daughters (1  2018), disabled due to childhood stroke, Gnosticism sophia       No current facility-administered medications on file prior to encounter.      Current Outpatient Medications on File Prior to Encounter   Medication Sig Dispense Refill    apixaban (ELIQUIS) 5 mg Tab Take 1 tablet (5 mg total) by mouth 2 (two) times daily. 60 tablet 5    gabapentin (NEURONTIN) 100 MG capsule Take 1 capsule (100  mg total) by mouth every evening. 30 capsule 1    magnesium oxide (MAG-OX) 400 mg (241.3 mg magnesium) tablet Take 1 tablet (400 mg total) by mouth 2 (two) times daily. 60 tablet 1    metoprolol tartrate (LOPRESSOR) 25 MG tablet Take 0.5 tablets (12.5 mg total) by mouth 2 (two) times daily. 30 tablet 1       Review of patient's allergies indicates:   Allergen Reactions    Bee sting [allergen ext-venom-honey bee]      Rash      Grass pollen-bermuda, standard      rash       Review of Systems   Constitutional: Positive for chills, fever and malaise/fatigue.   HENT: Negative.    Eyes: Negative.    Respiratory: Negative.    Cardiovascular: Negative.    Gastrointestinal: Positive for nausea and vomiting. Negative for abdominal pain, blood in stool, constipation, diarrhea and melena.   Genitourinary: Negative.    Musculoskeletal: Negative.    Neurological: Positive for dizziness. Negative for loss of consciousness.   Psychiatric/Behavioral: Negative.         Objective:     Vitals:    12/09/20 0931   BP: 134/70   Pulse: 102   Resp: 16   Temp: 97.4 °F (36.3 °C)         Constitutional:  not in acute distress and well developed  HENT: Head: Normal, normocephalic, atraumatic.  Eyes: conjunctiva clear and sclera nonicteric  Cardiovascular: regular rate and rhythm  Respiratory: normal chest expansion & respiratory effort   and no accessory muscle use  GI: soft and multiple scars, urostomy and ileostomy, tenderness to palpation diffusely  Musculoskeletal: no muscular tenderness noted  Skin: normal color  Neurological: alert, oriented x3  Psychiatric: mood and affect are within normal limits, pt is a good historian; no memory problems were noted    Significant Labs:  Recent Labs   Lab 12/04/20  0442 12/08/20  1701 12/09/20  0407   HGB 9.7* 12.2 11.2*       Lab Results   Component Value Date    WBC 10.52 12/09/2020    HGB 11.2 (L) 12/09/2020    HCT 37.7 12/09/2020    MCV 94 12/09/2020     (H) 12/09/2020       Lab Results    Component Value Date     12/09/2020    K 3.5 12/09/2020     (H) 12/09/2020    CO2 18 (L) 12/09/2020    BUN 32 (H) 12/09/2020    CREATININE 1.5 (H) 12/09/2020    CALCIUM 10.4 12/09/2020    ANIONGAP 12 12/09/2020    ESTGFRAFRICA 44.3 (A) 12/09/2020    EGFRNONAA 38.4 (A) 12/09/2020       Lab Results   Component Value Date    ALT 41 12/08/2020    AST 23 12/08/2020    ALKPHOS 236 (H) 12/08/2020    BILITOT 0.3 12/08/2020       Lab Results   Component Value Date    INR 1.0 11/18/2020    INR 1.1 10/23/2020    INR 1.1 09/17/2020       Significant Imaging:  Reviewed pertinent radiology findings.       Assessment/Plan:     Edita Riley is a 55 y.o. female with history of cervical cancer s/p radiation with resulting BL ureteral strictures s/p BL nephrostomy tube placement. On 9/11/20 she underwent creation of a transverse colon urinary conduit (Dr. Reynolds, Dr. Balbuena). Her post-operative course was complicated by a bowel perforation s/p extended right hemicolectomy and ileostomy creation (9/17/20). GI consulted for nausea and vomiting.    Problem List:  1. Nausea and vomiting  2. Dysphagia    Patient presents with worsening nausea and vomiting since Friday, associated with dehydration and fatigue.  She had a recent EGD in November that showed gastritis, no strictures.  H pylori negative.  In the setting of vomiting immediately following food intake, would recommend to proceed with EGD to evaluate for esophagitis.    Recommendations:  - Plan for EGD tomorrow  - NPO at midnight  - Please ensure Hgb >7, plts >50, INR <1.5 on the day of procedure  - Hold anticoagulation unless contraindicated  - conservative management with scheduled zofran, phenergan PRN, IVF  - daily PPI     Thank you for involving us in the care of Edita Riley. Please call with any additional questions, concerns or changes in the patient's clinical status. We will continue to follow.     Cassie Mercado,  MD  Gastroenterology Fellow PGY IV   [facility]

## 2020-12-09 NOTE — ED NOTES
LOC: The patient is awake, alert, and oriented to place, time, situation. Affect is appropriate.  Speech is appropriate and clear.     APPEARANCE: Patient resting comfortably in no acute distress.  Patient is clean and well groomed.    SKIN: The skin is warm and dry; color consistent with ethnicity.  Patient has normal skin turgor and moist mucus membranes.  Skin breakdown noted to ostomy sites    MUSCULOSKELETAL: Patient moving upper and lower extremities without difficulty.  Denies weakness.     RESPIRATORY: Airway is open and patent. Respirations spontaneous, even, easy, and non-labored.  Patient has a normal effort and rate.  No accessory muscle use noted. Denies cough.     CARDIAC:  Normal rhythm and rate noted.  No peripheral edema noted. No complaints of chest pain.      ABDOMEN: Soft and non tender to palpation.  No distention noted. Reports n/v. Ostomies noted to right and lower side of abdomens    NEUROLOGIC: Eyes open spontaneously.  Behavior appropriate to situation.  Follows commands; facial expression symmetrical.  Purposeful motor response noted; normal sensation in all extremities.

## 2020-12-09 NOTE — ASSESSMENT & PLAN NOTE
- suspect that ODESSA is prerenal, due to dehydration; recommend continue fluid resuscitation per primary  - no signs of obstruction; hydronephrosis is stable, slightly improved from 10/2020, and good UOP; no indications for urologic intervention  - UA consistent with bacteriuria, but no signs of infection; bacteriuria expected with colon conduit, no indication for antibiotics based on this  - trend creatinine

## 2020-12-09 NOTE — ASSESSMENT & PLAN NOTE
Chronic, controlled.  Latest blood pressure and vitals reviewed-   Temp:  [96.6 °F (35.9 °C)-98 °F (36.7 °C)]   Pulse:  []   Resp:  [16-20]   BP: ()/(53-86)   SpO2:  [97 %-100 %] .   Home meds for hypertension were reviewed and noted below. Hospital anti-hypertensive changes were made as shown below.  Hypertension Medications             metoprolol tartrate (LOPRESSOR) 25 MG tablet Take 0.5 tablets (12.5 mg total) by mouth 2 (two) times daily.        Will utilize p.r.n. blood pressure medication only if patient's blood pressure greater than  180/110 and she develops symptoms such as worsening chest pain or shortness of breath.

## 2020-12-09 NOTE — CONSULTS
Wound care consulted for urostomy and ileostomy skin care  PMH:  HTN, GERD, bilateral ureteral obstruction, FTT, DVT of lower extremity, osteoarthritis of back, major depressive, disorder, metastatic squamous cell carcinoma to lymph node  Assessment:  The patient has not spoken throughout the procedure.   The patient is lying on her side, both pouches not attached. The socorro-stomal skin is red/irritated, IAD with partial thickness skin loss.   The patient has lost weight and divots observed across abdomen near stomas. The skin is extremely painful when cleansed with water any rubbing motion.    The right ileostomy stoma is pink/moist above the skin level with divots on either side.    The left urostomy stoma is skin level, pink/moist with divots on both sides.   The skin was cleansed with plain tap water, gentian violet applied to skin to dry and provide antimicrobial protection. Stoma powder was applied to the skin/dusted then Cavilon barrier film applied to protect the skin from moisture.  The patient shows relief when touching skin after applying barrier film. An fernando ring was placed around each stoma then appropriate pouches cut to 23 mm then placed over the stomas- warmth added to ensure adherance to skin.  Hydrocolloid borders added to both wafters to extend the wafer.  The skin was pulled taunt to provide a flat surface when applying the wafers.   The pouches are intact, seal intact, patient is smiling- relief observed.     There was no urine output from the urostomy during pouch change.  The ileostomy produced bubbling with yellow/green fluid- mostly bubbles.  Reported to Kerri Castillo NP, CRS.  Recommendations:  Ileostomy and Urostomy-   Wash skin/stomas with plain warm water/gauze-  Sprinkle Stoma Powder over skin then lightly brush off with gauze- Spray skin with Cavlion Barrier spray to seal powder/provide moisture protection  Place fernando ring around each stoma, cut appropriate pouches to 23 mm then  place over fernando rings.  Have patient place hands over the pouches to provide warmth for about 1 minute.   Slightly pull up on pouches to ensure they are sealed.   Nursing to continue care, wound/ostomy care will follow-up niranjan Peterson RN, [facility]  [id number]

## 2020-12-09 NOTE — CONSULTS
[facility]  Urology  Consult Note    Patient Name: Edita Riley  MRN: [id number]  Admission Date: 12/8/2020  Hospital Length of Stay: 1   Code Status: Full Code   Attending Provider: Hammad Reynolds MD   Consulting Provider: Burton Gallardo MD  Primary Care Physician: Primary Doctor No  Principal Problem:<principal problem not specified>    Inpatient consult to Urology  Consult performed by: Burton Gallardo MD  Consult ordered by: Kerri Castillo NP          Subjective:     HPI:  Ms. Riley is a 54 yo F with history of cervical cancer s/p pelvic radiation. She has since developed bilateral ureteral strictures and bilateral hydronephrosis R>L. She had a MAG3 scan confirmed presence of bilateral obstruction. She is s/p open transverse colon conduit urinary diversion on 9/11/2020. Nephrostomy tubes were subsequently removed 10/1. She was then discharged on 10/13 to LTAC, from which she was discharged 12/4. She subsequently presented to the ED at [facility] on 12/8/20 with nausea, vomiting, and dehydration. Lactate was elevated to 3.9. Cr elevated to 1.6 (baseline 1.2.) She has been given fluid resuscitation. Cr slightly improved to 1.2, lactate normalized. Intermittently tachy to 110s as during prior hospitalizations, normotensive, no leukocytosis.  Urology was consulted for evaluation. Patient denies flank pain, fevers, or chills. Says urostomy has been putting out urine. CT AP shows bilateral hydroureteronephrosis stable from  when Cr was at baseline.     Past Medical History:   Diagnosis Date    Abnormal mammogram 8/25/2020    Anxiety     Cervical cancer 2014    Chronic back pain     Depression     Diarrhea due to malabsorption 11/14/2018    Fibromyalgia     Generalized abdominal pain 8/25/2020    GERD (gastroesophageal reflux disease)     Hiatal hernia 2014    History of cervical cancer 10/11/2018    History of cervical cancer 10/11/2018    Hx of psychiatric  care     Cymbalta, trazodone    Hypertension     Hypomagnesemia 11/21/2018    Lactose intolerance     Metastatic squamous cell carcinoma to lymph node 10/11/2018    Nephrostomy tube displaced     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 drug 11/14/2018    Osteoarthritis of back     Psychiatric problem     Stroke 1972       Past Surgical History:   Procedure Laterality Date    ANTEGRADE NEPHROSTOGRAPHY Bilateral 9/28/2020    Procedure: Nephrostogram - antegrade;  Surgeon: Leslie Balbuena MD;  Location: [facility] OR 1ST FLR;  Service: Urology;  Laterality: Bilateral;    BILATERAL OOPHORECTOMY  2015    CHOLECYSTECTOMY  11/09/2016    Done at [facility], path showed chronic cholecystitis and gallstones    cold knife conization  2014    COLECTOMY, RIGHT  9/17/2020    Procedure: COLECTOMY, RIGHT Extended;  Surgeon: Hammad Reynolds MD;  Location: [facility] OR 2ND FLR;  Service: Colon and Rectal;;    COLONOSCOPY  2014    COLONOSCOPY N/A 10/24/2016    at [facility], Dr Gutiérrez    COLONOSCOPY N/A 5/18/2018    Procedure: COLONOSCOPY;  Surgeon: Arden Gutiérrez MD;  Location: [facility] (4TH FLR);  Service: Endoscopy;  Laterality: N/A;    COLONOSCOPY N/A 7/28/2020    Procedure: COLONOSCOPY;  Surgeon: Hammad Reynolds MD;  Location: [facility] (4TH FLR);  Service: Colon and Rectal;  Laterality: N/A;  covid test [place] 7/25    CYSTOSCOPY WITH URETEROSCOPY, RETROGRADE PYELOGRAPHY, AND INSERTION OF STENT Bilateral 3/21/2020    Procedure: CYSTOSCOPY, WITH RETROGRADE PYELOGRAM,;  Surgeon: Leslie Balbuena MD;  Location: [facility] OR 1ST FLR;  Service: Urology;  Laterality: Bilateral;    ESOPHAGOGASTRODUODENOSCOPY  2014    ESOPHAGOGASTRODUODENOSCOPY  11/18/2020    ESOPHAGOGASTRODUODENOSCOPY N/A 11/18/2020    Procedure: ESOPHAGOGASTRODUODENOSCOPY (EGD);  Surgeon: Zenon Spencer MD;  Location: [facility];  Service: Endoscopy;  Laterality: N/A;    HYSTERECTOMY  2014    [facility] for cervical cancer    ILEOSTOMY  9/17/2020    Procedure:  CREATION, ILEOSTOMY;  Surgeon: Hammad Reynolds MD;  Location: [facility] OR 2ND FLR;  Service: Colon and Rectal;;    MOBILIZATION OF SPLENIC FLEXURE N/A 9/11/2020    Procedure: MOBILIZATION, COLONIC;  Surgeon: Hammad Reynolds MD;  Location: [facility] OR 2ND FLR;  Service: Colon and Rectal;  Laterality: N/A;    OOPHORECTOMY      RETROPERITONEAL LYMPHADENECTOMY Right 9/17/2018    Procedure: LYMPHADENECTOMY, RETROPERITONEUM;  Surgeon: Sebas Reed MD;  Location: [facility] OR 2ND FLR;  Service: General;  Laterality: Right;  ILIAC       Review of patient's allergies indicates:   Allergen Reactions    Bee sting [allergen ext-venom-honey bee]      Rash      Grass pollen-bermuda, standard      rash       Family History     Problem Relation (Age of Onset)    Breast cancer Maternal Aunt    Cancer Father, Mother    Cervical cancer Mother    Colon cancer Father    Drug abuse Daughter, Daughter    Esophageal cancer Father    Heart disease Sister, Maternal Grandmother    Suicide Daughter          Tobacco Use    Smoking status: Former Smoker    Smokeless tobacco: Never Used   Substance and Sexual Activity    Alcohol use: No     Alcohol/week: 0.0 standard drinks    Drug use: No    Sexual activity: Yes     Partners: Male     Birth control/protection: None     Comment:  19 years since 1999       Review of Systems   Constitutional: Positive for fatigue. Negative for chills and fever.   HENT: Negative for trouble swallowing.    Eyes: Negative for visual disturbance.   Respiratory: Negative for shortness of breath.    Cardiovascular: Negative for chest pain.   Gastrointestinal: Positive for nausea and vomiting.   Genitourinary: Negative for decreased urine volume, flank pain and hematuria.   Musculoskeletal: Negative for myalgias.   Skin: Negative for wound.   Neurological: Negative for weakness.   Hematological: Does not bruise/bleed easily.   Psychiatric/Behavioral: Negative for sleep disturbance.       Objective:     Temp:   [96.6 °F (35.9 °C)-98 °F (36.7 °C)] 96.9 °F (36.1 °C)  Pulse:  [] 105  Resp:  [16-20] 18  SpO2:  [97 %-100 %] 100 %  BP: ()/(53-86) 103/68     Body mass index is 23.04 kg/m².           Drains     Drain                 Urostomy 09/11/20 ileal conduit LLQ 89 days         Ileostomy 09/18/20 RLQ 82 days                Physical Exam    Significant Labs:    BMP:  Recent Labs   Lab 12/04/20  0442 12/08/20  2033 12/09/20  0407    140 142   K 3.5 3.8 3.5   * 112* 112*   CO2 20* 15* 18*   BUN 36* 35* 32*   CREATININE 1.2 1.6* 1.5*   CALCIUM 10.9* 10.2 10.4       CBC:  Recent Labs   Lab 12/04/20  0442 12/08/20  1701 12/09/20  0407   WBC 5.60 11.88 10.52   HGB 9.7* 12.2 11.2*   HCT 34.5* 42.3 37.7    314 404*       Urine Studies:   Recent Labs   Lab 12/08/20  1845   COLORU [place]   APPEARANCEUA Cloudy*   PHUR 6.0   SPECGRAV 1.015   PROTEINUA 2+*   GLUCUA Negative   KETONESU Negative   BILIRUBINUA Negative   OCCULTUA 1+*   NITRITE Negative   LEUKOCYTESUR 3+*   RBCUA 12*   WBCUA >100*   BACTERIA Many*   HYALINECASTS 0       Significant Imaging:  All pertinent imaging results/findings from the past 24 hours have been reviewed.                    Assessment and Plan:     Bilateral ureteral obstruction  - suspect that ODESSA is prerenal, due to dehydration; recommend continue fluid resuscitation per primary  - no signs of obstruction; hydronephrosis is stable, slightly improved from 10/2020, and good UOP; no indications for urologic intervention  - UA consistent with bacteriuria, but no signs of infection; bacteriuria expected with colon conduit, no indication for antibiotics based on this  - trend creatinine        VTE Risk Mitigation (From admission, onward)         Ordered     enoxaparin injection 70 mg  Every 12 hours (non-standard times)      12/09/20 0924     Reason for No Pharmacological VTE Prophylaxis  Once     Question:  Reasons:  Answer:  Already adequately anticoagulated on oral Anticoagulants     12/08/20 1940     IP VTE HIGH RISK PATIENT  Once      12/08/20 1940     Place sequential compression device  Until discontinued      12/08/20 1940                  Burton Gallardo MD  Urology  [facility]    As above

## 2020-12-09 NOTE — PLAN OF CARE
Problem: Physical Therapy Goal  Goal: Physical Therapy Goal  Description: Goals to be met by: 2021     Patient will increase functional independence with mobility by performin. Sit to stand transfer with Supervision  2. Bed to chair transfer with Supervision using LRAD.  3. Gait  x 100ft feet with Supervision using LRAD.   4. Ascend/descend 3 stair with left Handrails Stand-by Assistance using LRAD.         Outcome: Ongoing, Progressing     Patient evaluated and treated 2020    ALICIA Rothman  2020

## 2020-12-09 NOTE — PT/OT/SLP EVAL
Occupational Therapy   Co-Evaluation and Treatment    Name: Edita Riley  MRN: [id number]  Admitting Diagnosis:  <principal problem not specified>      Recommendations:     Discharge Recommendations: home health OT  Discharge Equipment Recommendations:  none  Barriers to discharge:  None    Assessment:     Edita Riley is a 55 y.o. female with a medical diagnosis of <principal problem not specified>.  She presents with HOB elevated with c/o of fatigue. Pt required max encouragement to participate in evaluation/treatment. Pt recently discharged from LTAC and return to hospital 2' nausea/vomiting. Performance deficits affecting function: impaired endurance, weakness, impaired self care skills, impaired functional mobilty, gait instability, impaired balance.  Pt would benefit from skilled OT services in order to maximize independence with ADLs and facilitate safe discharge. Pt would benefit from home health OT once medically stable for discharge.     Rehab Prognosis: Good; patient would benefit from acute skilled OT services to address these deficits and reach maximum level of function.       Plan:     Patient to be seen 3 x/week to address the above listed problems via self-care/home management, therapeutic activities, therapeutic exercises  · Plan of Care Expires: 01/08/21  · Plan of Care Reviewed with: patient    Subjective     Chief Complaint: fatigue  Patient/Family Comments/goals: to return home    Occupational Profile:  Living Environment: Patient lives with spouse and daughter in [facility] with 3 MANAV, L handrail.   Previous level of function: Family assists as needed with ADLs. Patient ambulates using no AD but owns a RW.  Equipment Used at Home: walker, rolling  Assistance upon Discharge: Family can assist    Pain/Comfort:  · Pain Rating 1: 0/10    Patients cultural, spiritual, Uatsdin conflicts given the current situation: no    Objective:     Communicated with: RN and PT prior to session.   Patient found HOB elevated with peripheral IV, colostomy upon OT entry to room.    General Precautions: Standard, fall   Orthopedic Precautions:N/A   Braces: N/A     Occupational Performance:    Bed Mobility:    · Patient completed Rolling/Turning to Right with stand by assistance  · Patient completed Supine to Sit with stand by assistance  · Pt with c/o of dizziness seated EOB  · Patient completed Sit to Supine with stand by assistance    Functional Mobility/Transfers:  · Patient completed Sit <> Stand Transfer with contact guard assistance  with  hand-held assist   · Functional Mobility: Pt ambulated ~3 side steps R along EOB with CGA and HHA in order to maximize functional activity tolerance and standing balance required for engagement in occupations of choice.    · No LOB, SOB, or c/o of dizziness    Activities of Daily Living:  · Grooming: Set-up A to perform facial hygiene seated EOB  · Toileting: dependence colostomy in place    Cognitive/Visual Perceptual:  Cognitive/Psychosocial Skills:     -       Oriented to: Person, Place, Time and Situation   -       Follows Commands/attention:Follows one-step commands  -       Communication: clear/fluent  -       Memory: No Deficits noted  -       Safety awareness/insight to disability: intact   -       Mood/Affect/Coping skills/emotional control: Lethargic    Physical Exam:  Upper Extremity Range of Motion:     -       Right Upper Extremity: WFL  -       Left Upper Extremity: WFL  Upper Extremity Strength:    -       Right Upper Extremity: WFL  -       Left Upper Extremity: WFL    AMPAC 6 Click ADL:  AMPAC Total Score: 21    Treatment & Education:  -Therapist provided facilitation and instruction of proper body mechanics, energy conservation, and fall prevention strategies during tasks listed above.  -Co-tx with PT performed due to need for education and assistance from two skilled therapy disciplines at pt's current functional level.    -Pt educated on role of OT,  POC and goals for therapy  -Pt educated on importance of OOB activities with staff member assistance and sitting OOB majority of the day.   -Pt declined sitting in bedside chair at conclusion of session and encouraged to sit in bedside chair with nursing  -Pt verbalized understanding. Pt expressed no further concerns/questions  -Whiteboard updated  Education:    Patient left HOB elevated with all lines intact and call button in reach    GOALS:   Multidisciplinary Problems     Occupational Therapy Goals        Problem: Occupational Therapy Goal    Goal Priority Disciplines Outcome Interventions   Occupational Therapy Goal     OT, PT/OT Ongoing, Progressing    Description: Goals to be met by: 12/23/20     Patient will increase functional independence with ADLs by performing:    Feeding with [place].  UE Dressing with [place].  LE Dressing with Minimal Assistance.  Grooming while standing at sink with Stand-by Assistance.  Toileting from toilet with Minimal Assistance for hygiene and clothing management.   Toilet transfer to toilet with Stand-by Assistance.                     History:     Past Medical History:   Diagnosis Date    Abnormal mammogram 8/25/2020    Anxiety     Cervical cancer 2014    Chronic back pain     Depression     Diarrhea due to malabsorption 11/14/2018    Fibromyalgia     Generalized abdominal pain 8/25/2020    GERD (gastroesophageal reflux disease)     Hiatal hernia 2014    History of cervical cancer 10/11/2018    History of cervical cancer 10/11/2018    Hx of psychiatric care     Cymbalta, trazodone    Hypertension     Hypomagnesemia 11/21/2018    Lactose intolerance     Metastatic squamous cell carcinoma to lymph node 10/11/2018    Nephrostomy tube displaced     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 drug 11/14/2018    Osteoarthritis of back     Psychiatric problem     Stroke 1972       Past Surgical History:   Procedure Laterality Date    ANTEGRADE NEPHROSTOGRAPHY  Bilateral 9/28/2020    Procedure: Nephrostogram - antegrade;  Surgeon: Leslie Balbuena MD;  Location: [facility] OR 1ST FLR;  Service: Urology;  Laterality: Bilateral;    BILATERAL OOPHORECTOMY  2015    CHOLECYSTECTOMY  11/09/2016    Done at [facility], path showed chronic cholecystitis and gallstones    cold knife conization  2014    COLECTOMY, RIGHT  9/17/2020    Procedure: COLECTOMY, RIGHT Extended;  Surgeon: Hammad Reynolds MD;  Location: [facility] OR 2ND FLR;  Service: Colon and Rectal;;    COLONOSCOPY  2014    COLONOSCOPY N/A 10/24/2016    at [facility], Dr Gutiérrez    COLONOSCOPY N/A 5/18/2018    Procedure: COLONOSCOPY;  Surgeon: Arden Gutiérrez MD;  Location: [facility] (4TH FLR);  Service: Endoscopy;  Laterality: N/A;    COLONOSCOPY N/A 7/28/2020    Procedure: COLONOSCOPY;  Surgeon: Hammad Reynolds MD;  Location: [facility] (4TH FLR);  Service: Colon and Rectal;  Laterality: N/A;  covid test [place] 7/25    CYSTOSCOPY WITH URETEROSCOPY, RETROGRADE PYELOGRAPHY, AND INSERTION OF STENT Bilateral 3/21/2020    Procedure: CYSTOSCOPY, WITH RETROGRADE PYELOGRAM,;  Surgeon: Leslie Balbuena MD;  Location: [facility] OR 1ST FLR;  Service: Urology;  Laterality: Bilateral;    ESOPHAGOGASTRODUODENOSCOPY  2014    ESOPHAGOGASTRODUODENOSCOPY  11/18/2020    ESOPHAGOGASTRODUODENOSCOPY N/A 11/18/2020    Procedure: ESOPHAGOGASTRODUODENOSCOPY (EGD);  Surgeon: Zenon Spencer MD;  Location: [facility];  Service: Endoscopy;  Laterality: N/A;    HYSTERECTOMY  2014    [facility] for cervical cancer    ILEOSTOMY  9/17/2020    Procedure: CREATION, ILEOSTOMY;  Surgeon: Hammad Reynolds MD;  Location: [facility] OR 2ND FLR;  Service: Colon and Rectal;;    MOBILIZATION OF SPLENIC FLEXURE N/A 9/11/2020    Procedure: MOBILIZATION, COLONIC;  Surgeon: Hammad Reynolds MD;  Location: [facility] OR 2ND FLR;  Service: Colon and Rectal;  Laterality: N/A;    OOPHORECTOMY      RETROPERITONEAL LYMPHADENECTOMY Right 9/17/2018    Procedure: LYMPHADENECTOMY,  RETROPERITONEUM;  Surgeon: Sebas Reed MD;  Location: [facility] OR [address] [place] [address];  Service: General;  Laterality: Right;  ILIAC       Time Tracking:     OT Date of Treatment: 12/09/20  OT Start Time: 1042  OT Stop Time: 1059  OT Total Time (min): 17 min    Billable Minutes:Evaluation 9  Self Care/Home Management 8    Stephanie Sales OT  12/9/2020

## 2020-12-09 NOTE — NURSING TRANSFER
Nursing Transfer Note      12/8/2020     Transfer From: ER    Transfer via stretcher    Transfer with none    Transported by transport    Medicines sent:none    Chart send with patient: Yes    Notified: none    Patient reassessed at: 12/08/20, 2200    Upon arrival to floor: patient oriented to room, call bell in reach and bed in lowest position

## 2020-12-09 NOTE — PT/OT/SLP EVAL
"Physical Therapy Evaluation    Patient Name:  Edita Riley   MRN:  [id number]    Recommendations:     Discharge Recommendations:  home health PT   Discharge Equipment Recommendations: none   Barriers to discharge: decreased functional mobility    Assessment:     Edita Riley is a 55 y.o. female admitted with a medical diagnosis of <principal problem not specified>.  She presents with the following impairments/functional limitations:  impaired endurance, impaired functional mobilty, weakness, gait instability, impaired balance, impaired self care skills. Tolerated session with complaints of fatigue. Pt able to take 3 steps EOB with CGA demo decreased speed and forward flexed posture. Pt requiring max encouragement for participation in functional mobility today. Pt safe to transfer bed to chair assist x 1. Patient would benefit from skilled acute PT 3x/week to improve functional mobility. Recommending pt receives PT services in HH setting following d/c from hospital once medically cleared.       Rehab Prognosis: Good; patient would benefit from acute skilled PT services to address these deficits and reach maximum level of function.    Recent Surgery: * No surgery found *      Plan:     During this hospitalization, patient to be seen 3 x/week to address the identified rehab impairments via gait training, therapeutic activities, therapeutic exercises, neuromuscular re-education and progress toward the following goals:    · Plan of Care Expires:  01/08/21    Subjective     Chief Complaint: fatigue  Patient/Family Comments/goals: "moving made me feel better"  Pain/Comfort:  · Pain Rating 1: 0/10    Patients cultural, spiritual, Moravian conflicts given the current situation: no    Living Environment:  Pt lives with  in 1st floor apt with 3 MANAV LHR. Pt uses a tub shower with no gb or seat. Pt is driving, not working, and reports no recent falls.   Prior to admission, patients level of " function was Mod I with use of RW for ambulation at home and in community.  Equipment used at home: walker, rolling.  DME owned (not currently used): none.  Upon discharge, patient will have assistance from .    Objective:     Communicated with RN and OT prior to session.  Patient found HOB elevated with peripheral IV, colostomy  upon PT entry to room.    General Precautions: Standard, fall   Orthopedic Precautions:N/A   Braces: N/A     Exams:  · Cognitive Exam:  Patient is oriented to Person, Place, Time and Situation  · Gross Motor Coordination:  WFL  · RLE ROM: WFL  · RLE Strength: WFL  · LLE ROM: WFL  · LLE Strength: WFL    Functional Mobility:  · Bed Mobility:     · Rolling Right: stand by assistance  · Scooting: stand by assistance  · Supine to Sit: stand by assistance  · Sit to Supine: stand by assistance  · Transfers:     · Sit to Stand:  contact guard assistance with no AD  · Gait: 3 steps EOB CGA no AD, demo decreased speed and forward flexed posture.   · Balance: standing, CGA    Therapeutic Activities and Exercises:   -Pt educated on: PT role in POC, safety with mobility, benefits of OOB activities, d/c recs and dme.  -sit to stand  -scooting in bed  -lateral steps EOB  -sitting EOB x 5 min    AM-PAC 6 CLICK MOBILITY  Total Score:18     Patient left HOB elevated per pt request with all lines intact, call button in reach and RN notified.    GOALS:   Multidisciplinary Problems     Physical Therapy Goals        Problem: Physical Therapy Goal    Goal Priority Disciplines Outcome Goal Variances Interventions   Physical Therapy Goal     PT, PT/OT Ongoing, Progressing     Description: Goals to be met by: 2021     Patient will increase functional independence with mobility by performin. Sit to stand transfer with Supervision  2. Bed to chair transfer with Supervision using LRAD.  3. Gait  x 100ft feet with Supervision using LRAD.   4. Ascend/descend 3 stair with left Handrails Stand-by  Assistance using LRAD.                          History:     Past Medical History:   Diagnosis Date    Abnormal mammogram 8/25/2020    Anxiety     Cervical cancer 2014    Chronic back pain     Depression     Diarrhea due to malabsorption 11/14/2018    Fibromyalgia     Generalized abdominal pain 8/25/2020    GERD (gastroesophageal reflux disease)     Hiatal hernia 2014    History of cervical cancer 10/11/2018    History of cervical cancer 10/11/2018    Hx of psychiatric care     Cymbalta, trazodone    Hypertension     Hypomagnesemia 11/21/2018    Lactose intolerance     Metastatic squamous cell carcinoma to lymph node 10/11/2018    Nephrostomy tube displaced     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 drug 11/14/2018    Osteoarthritis of back     Psychiatric problem     Stroke 1972       Past Surgical History:   Procedure Laterality Date    ANTEGRADE NEPHROSTOGRAPHY Bilateral 9/28/2020    Procedure: Nephrostogram - antegrade;  Surgeon: Leslie Balbuena MD;  Location: [facility] OR 1ST [facility];  Service: Urology;  Laterality: Bilateral;    BILATERAL OOPHORECTOMY  2015    CHOLECYSTECTOMY  11/09/2016    Done at [facility], path showed chronic cholecystitis and gallstones    cold knife conization  2014    COLECTOMY, RIGHT  9/17/2020    Procedure: COLECTOMY, RIGHT Extended;  Surgeon: Hammad Reynolds MD;  Location: [facility] OR 2ND FLR;  Service: Colon and Rectal;;    COLONOSCOPY  2014    COLONOSCOPY N/A 10/24/2016    at [facility], Dr Gutiérrez    COLONOSCOPY N/A 5/18/2018    Procedure: COLONOSCOPY;  Surgeon: Arden Gutiérrez MD;  Location: [facility] (4TH FLR);  Service: Endoscopy;  Laterality: N/A;    COLONOSCOPY N/A 7/28/2020    Procedure: COLONOSCOPY;  Surgeon: Hammad Reynolds MD;  Location: [facility] (4TH FLR);  Service: Colon and Rectal;  Laterality: N/A;  covid test [place] 7/25    CYSTOSCOPY WITH URETEROSCOPY, RETROGRADE PYELOGRAPHY, AND INSERTION OF STENT Bilateral 3/21/2020    Procedure: CYSTOSCOPY, WITH  RETROGRADE PYELOGRAM,;  Surgeon: Leslie Balbuena MD;  Location: [facility] OR 1ST FLR;  Service: Urology;  Laterality: Bilateral;    ESOPHAGOGASTRODUODENOSCOPY  2014    ESOPHAGOGASTRODUODENOSCOPY  11/18/2020    ESOPHAGOGASTRODUODENOSCOPY N/A 11/18/2020    Procedure: ESOPHAGOGASTRODUODENOSCOPY (EGD);  Surgeon: Zenon Spencer MD;  Location: [facility];  Service: Endoscopy;  Laterality: N/A;    HYSTERECTOMY  2014    [facility] for cervical cancer    ILEOSTOMY  9/17/2020    Procedure: CREATION, ILEOSTOMY;  Surgeon: Hammad Reynolds MD;  [facility]LR;  Service: Colon and Rectal;;    MOBILIZATION OF SPLENIC FLEXURE N/A 9/11/2020    Procedure: MOBILIZATION, COLONIC;  Surgeon: Hammad Reynolds MD;  Location: [facility] OR 2ND FLR;  Service: Colon and Rectal;  Laterality: N/A;    OOPHORECTOMY      RETROPERITONEAL LYMPHADENECTOMY Right 9/17/2018    Procedure: LYMPHADENECTOMY, RETROPERITONEUM;  Surgeon: Sebas Reed MD;  Location: [facility] OR 2ND FLR;  Service: General;  Laterality: Right;  ILIAC       Time Tracking:     PT Received On: 12/09/20  PT Start Time: 1044     PT Stop Time: 1059  PT Total Time (min): 15 min     Billable Minutes: Evaluation 7 min and Therapeutic Activity 8 min      ALICIA Rothman  12/09/2020

## 2020-12-10 LAB
ALLENS TEST: ABNORMAL
ANION GAP SERPL CALC-SCNC: 12 MMOL/L (ref 8–16)
APTT BLDCRRT: 25.8 SEC (ref 21–32)
APTT BLDCRRT: 27.6 SEC (ref 21–32)
BASOPHILS # BLD AUTO: 0.03 K/UL (ref 0–0.2)
BASOPHILS # BLD AUTO: 0.04 K/UL (ref 0–0.2)
BASOPHILS # BLD AUTO: 0.04 K/UL (ref 0–0.2)
BASOPHILS NFR BLD: 0.5 % (ref 0–1.9)
BASOPHILS NFR BLD: 0.6 % (ref 0–1.9)
BASOPHILS NFR BLD: 0.6 % (ref 0–1.9)
BUN SERPL-MCNC: 23 MG/DL (ref 6–20)
BUN SERPL-MCNC: 24 MG/DL (ref 6–20)
BUN SERPL-MCNC: 24 MG/DL (ref 6–20)
CALCIUM SERPL-MCNC: 9.5 MG/DL (ref 8.7–10.5)
CALCIUM SERPL-MCNC: 9.9 MG/DL (ref 8.7–10.5)
CALCIUM SERPL-MCNC: 9.9 MG/DL (ref 8.7–10.5)
CHLORIDE SERPL-SCNC: 117 MMOL/L (ref 95–110)
CHLORIDE SERPL-SCNC: 117 MMOL/L (ref 95–110)
CHLORIDE SERPL-SCNC: 118 MMOL/L (ref 95–110)
CO2 SERPL-SCNC: 16 MMOL/L (ref 23–29)
CO2 SERPL-SCNC: 17 MMOL/L (ref 23–29)
CO2 SERPL-SCNC: 17 MMOL/L (ref 23–29)
CREAT SERPL-MCNC: 1.3 MG/DL (ref 0.5–1.4)
CREAT SERPL-MCNC: 1.4 MG/DL (ref 0.5–1.4)
CREAT SERPL-MCNC: 1.4 MG/DL (ref 0.5–1.4)
D DIMER PPP IA.FEU-MCNC: 1.46 MG/L FEU
DELSYS: ABNORMAL
DIFFERENTIAL METHOD: ABNORMAL
EOSINOPHIL # BLD AUTO: 0.2 K/UL (ref 0–0.5)
EOSINOPHIL NFR BLD: 2.5 % (ref 0–8)
EOSINOPHIL NFR BLD: 3.2 % (ref 0–8)
EOSINOPHIL NFR BLD: 3.2 % (ref 0–8)
ERYTHROCYTE [DISTWIDTH] IN BLOOD BY AUTOMATED COUNT: 20.5 % (ref 11.5–14.5)
ERYTHROCYTE [DISTWIDTH] IN BLOOD BY AUTOMATED COUNT: 20.7 % (ref 11.5–14.5)
ERYTHROCYTE [DISTWIDTH] IN BLOOD BY AUTOMATED COUNT: 20.7 % (ref 11.5–14.5)
EST. GFR  (AFRICAN AMERICAN): 48.1 ML/MIN/1.73 M^2
EST. GFR  (AFRICAN AMERICAN): 48.1 ML/MIN/1.73 M^2
EST. GFR  (AFRICAN AMERICAN): 52.6 ML/MIN/1.73 M^2
EST. GFR  (NON AFRICAN AMERICAN): 41.7 ML/MIN/1.73 M^2
EST. GFR  (NON AFRICAN AMERICAN): 41.7 ML/MIN/1.73 M^2
EST. GFR  (NON AFRICAN AMERICAN): 45.6 ML/MIN/1.73 M^2
FLOW: 10
GLUCOSE SERPL-MCNC: 95 MG/DL (ref 70–110)
HCO3 UR-SCNC: 16.9 MMOL/L (ref 24–28)
HCT VFR BLD AUTO: 32.2 % (ref 37–48.5)
HCT VFR BLD AUTO: 32.7 % (ref 37–48.5)
HCT VFR BLD AUTO: 32.7 % (ref 37–48.5)
HGB BLD-MCNC: 9.5 G/DL (ref 12–16)
HGB BLD-MCNC: 9.6 G/DL (ref 12–16)
HGB BLD-MCNC: 9.6 G/DL (ref 12–16)
IMM GRANULOCYTES # BLD AUTO: 0.02 K/UL (ref 0–0.04)
IMM GRANULOCYTES # BLD AUTO: 0.03 K/UL (ref 0–0.04)
IMM GRANULOCYTES # BLD AUTO: 0.03 K/UL (ref 0–0.04)
IMM GRANULOCYTES NFR BLD AUTO: 0.3 % (ref 0–0.5)
IMM GRANULOCYTES NFR BLD AUTO: 0.5 % (ref 0–0.5)
IMM GRANULOCYTES NFR BLD AUTO: 0.5 % (ref 0–0.5)
INR PPP: 1 (ref 0.8–1.2)
INR PPP: 1 (ref 0.8–1.2)
LYMPHOCYTES # BLD AUTO: 1.2 K/UL (ref 1–4.8)
LYMPHOCYTES # BLD AUTO: 1.3 K/UL (ref 1–4.8)
LYMPHOCYTES # BLD AUTO: 1.3 K/UL (ref 1–4.8)
LYMPHOCYTES NFR BLD: 20.3 % (ref 18–48)
LYMPHOCYTES NFR BLD: 21.4 % (ref 18–48)
LYMPHOCYTES NFR BLD: 21.4 % (ref 18–48)
MAGNESIUM SERPL-MCNC: 1.7 MG/DL (ref 1.6–2.6)
MCH RBC QN AUTO: 27.3 PG (ref 27–31)
MCH RBC QN AUTO: 27.3 PG (ref 27–31)
MCH RBC QN AUTO: 27.4 PG (ref 27–31)
MCHC RBC AUTO-ENTMCNC: 29.4 G/DL (ref 32–36)
MCHC RBC AUTO-ENTMCNC: 29.4 G/DL (ref 32–36)
MCHC RBC AUTO-ENTMCNC: 29.5 G/DL (ref 32–36)
MCV RBC AUTO: 93 FL (ref 82–98)
MODE: ABNORMAL
MONOCYTES # BLD AUTO: 0.7 K/UL (ref 0.3–1)
MONOCYTES # BLD AUTO: 1.1 K/UL (ref 0.3–1)
MONOCYTES # BLD AUTO: 1.1 K/UL (ref 0.3–1)
MONOCYTES NFR BLD: 12.4 % (ref 4–15)
MONOCYTES NFR BLD: 18.2 % (ref 4–15)
MONOCYTES NFR BLD: 18.2 % (ref 4–15)
NEUTROPHILS # BLD AUTO: 3.5 K/UL (ref 1.8–7.7)
NEUTROPHILS # BLD AUTO: 3.5 K/UL (ref 1.8–7.7)
NEUTROPHILS # BLD AUTO: 3.8 K/UL (ref 1.8–7.7)
NEUTROPHILS NFR BLD: 56.1 % (ref 38–73)
NEUTROPHILS NFR BLD: 56.1 % (ref 38–73)
NEUTROPHILS NFR BLD: 64 % (ref 38–73)
NRBC BLD-RTO: 0 /100 WBC
PCO2 BLDA: 27.6 MMHG (ref 35–45)
PH SMN: 7.39 [PH] (ref 7.35–7.45)
PHOSPHATE SERPL-MCNC: 2.5 MG/DL (ref 2.7–4.5)
PLATELET # BLD AUTO: 346 K/UL (ref 150–350)
PLATELET # BLD AUTO: 346 K/UL (ref 150–350)
PLATELET # BLD AUTO: 351 K/UL (ref 150–350)
PMV BLD AUTO: 10.7 FL (ref 9.2–12.9)
PMV BLD AUTO: 11.1 FL (ref 9.2–12.9)
PMV BLD AUTO: 11.1 FL (ref 9.2–12.9)
PO2 BLDA: 229 MMHG (ref 80–100)
POC BE: -8 MMOL/L
POC SATURATED O2: 100 % (ref 95–100)
POC TCO2: 18 MMOL/L (ref 23–27)
POCT GLUCOSE: 99 MG/DL (ref 70–110)
POTASSIUM SERPL-SCNC: 3.3 MMOL/L (ref 3.5–5.1)
POTASSIUM SERPL-SCNC: 3.3 MMOL/L (ref 3.5–5.1)
POTASSIUM SERPL-SCNC: 3.6 MMOL/L (ref 3.5–5.1)
PROTHROMBIN TIME: 11.2 SEC (ref 9–12.5)
PROTHROMBIN TIME: 11.3 SEC (ref 9–12.5)
RBC # BLD AUTO: 3.47 M/UL (ref 4–5.4)
RBC # BLD AUTO: 3.52 M/UL (ref 4–5.4)
RBC # BLD AUTO: 3.52 M/UL (ref 4–5.4)
SAMPLE: ABNORMAL
SITE: ABNORMAL
SODIUM SERPL-SCNC: 146 MMOL/L (ref 136–145)
TROPONIN I SERPL DL<=0.01 NG/ML-MCNC: 0.06 NG/ML (ref 0–0.03)
TROPONIN I SERPL DL<=0.01 NG/ML-MCNC: 0.06 NG/ML (ref 0–0.03)
WBC # BLD AUTO: 5.9 K/UL (ref 3.9–12.7)
WBC # BLD AUTO: 6.17 K/UL (ref 3.9–12.7)
WBC # BLD AUTO: 6.17 K/UL (ref 3.9–12.7)

## 2020-12-10 PROCEDURE — 85610 PROTHROMBIN TIME: CPT | Mod: 91

## 2020-12-10 PROCEDURE — 99232 PR SUBSEQUENT HOSPITAL CARE,LEVL II: ICD-10-PCS | Mod: 24,,, | Performed by: SURGERY

## 2020-12-10 PROCEDURE — 63600175 PHARM REV CODE 636 W HCPCS: Performed by: STUDENT IN AN ORGANIZED HEALTH CARE EDUCATION/TRAINING PROGRAM

## 2020-12-10 PROCEDURE — 99900035 HC TECH TIME PER 15 MIN (STAT)

## 2020-12-10 PROCEDURE — 85610 PROTHROMBIN TIME: CPT

## 2020-12-10 PROCEDURE — 25000003 PHARM REV CODE 250: Performed by: COLON & RECTAL SURGERY

## 2020-12-10 PROCEDURE — 82803 BLOOD GASES ANY COMBINATION: CPT

## 2020-12-10 PROCEDURE — 85379 FIBRIN DEGRADATION QUANT: CPT

## 2020-12-10 PROCEDURE — 85730 THROMBOPLASTIN TIME PARTIAL: CPT

## 2020-12-10 PROCEDURE — 83735 ASSAY OF MAGNESIUM: CPT

## 2020-12-10 PROCEDURE — 85025 COMPLETE CBC W/AUTO DIFF WBC: CPT | Mod: 91

## 2020-12-10 PROCEDURE — 36600 WITHDRAWAL OF ARTERIAL BLOOD: CPT

## 2020-12-10 PROCEDURE — 94761 N-INVAS EAR/PLS OXIMETRY MLT: CPT

## 2020-12-10 PROCEDURE — 25000003 PHARM REV CODE 250: Performed by: STUDENT IN AN ORGANIZED HEALTH CARE EDUCATION/TRAINING PROGRAM

## 2020-12-10 PROCEDURE — 27000221 HC OXYGEN, UP TO 24 HOURS

## 2020-12-10 PROCEDURE — 25500020 PHARM REV CODE 255: Performed by: COLON & RECTAL SURGERY

## 2020-12-10 PROCEDURE — 93005 ELECTROCARDIOGRAM TRACING: CPT

## 2020-12-10 PROCEDURE — 80048 BASIC METABOLIC PNL TOTAL CA: CPT

## 2020-12-10 PROCEDURE — 80048 BASIC METABOLIC PNL TOTAL CA: CPT | Mod: 91

## 2020-12-10 PROCEDURE — 20600001 HC STEP DOWN PRIVATE ROOM

## 2020-12-10 PROCEDURE — 63600175 PHARM REV CODE 636 W HCPCS: Performed by: NURSE PRACTITIONER

## 2020-12-10 PROCEDURE — 93010 EKG 12-LEAD: ICD-10-PCS | Mod: ,,, | Performed by: INTERNAL MEDICINE

## 2020-12-10 PROCEDURE — 84484 ASSAY OF TROPONIN QUANT: CPT | Mod: 91

## 2020-12-10 PROCEDURE — 99232 SBSQ HOSP IP/OBS MODERATE 35: CPT | Mod: 24,,, | Performed by: SURGERY

## 2020-12-10 PROCEDURE — 84100 ASSAY OF PHOSPHORUS: CPT

## 2020-12-10 PROCEDURE — 85730 THROMBOPLASTIN TIME PARTIAL: CPT | Mod: 91

## 2020-12-10 PROCEDURE — 93010 ELECTROCARDIOGRAM REPORT: CPT | Mod: ,,, | Performed by: INTERNAL MEDICINE

## 2020-12-10 PROCEDURE — 84484 ASSAY OF TROPONIN QUANT: CPT

## 2020-12-10 PROCEDURE — 36415 COLL VENOUS BLD VENIPUNCTURE: CPT

## 2020-12-10 RX ORDER — LORAZEPAM 0.5 MG/1
0.5 TABLET ORAL ONCE
Status: COMPLETED | OUTPATIENT
Start: 2020-12-10 | End: 2020-12-10

## 2020-12-10 RX ORDER — MUPIROCIN 20 MG/G
OINTMENT TOPICAL 2 TIMES DAILY
Status: COMPLETED | OUTPATIENT
Start: 2020-12-10 | End: 2020-12-14

## 2020-12-10 RX ORDER — ENOXAPARIN SODIUM 100 MG/ML
70 INJECTION SUBCUTANEOUS EVERY 12 HOURS
Status: DISCONTINUED | OUTPATIENT
Start: 2020-12-10 | End: 2020-12-12

## 2020-12-10 RX ORDER — ENOXAPARIN SODIUM 100 MG/ML
70 INJECTION SUBCUTANEOUS ONCE
Status: COMPLETED | OUTPATIENT
Start: 2020-12-10 | End: 2020-12-10

## 2020-12-10 RX ADMIN — GABAPENTIN 100 MG: 100 CAPSULE ORAL at 10:12

## 2020-12-10 RX ADMIN — MUPIROCIN: 20 OINTMENT TOPICAL at 10:12

## 2020-12-10 RX ADMIN — LORAZEPAM 0.5 MG: 0.5 TABLET ORAL at 11:12

## 2020-12-10 RX ADMIN — ENOXAPARIN SODIUM 70 MG: 80 INJECTION SUBCUTANEOUS at 10:12

## 2020-12-10 RX ADMIN — ENOXAPARIN SODIUM 70 MG: 100 INJECTION SUBCUTANEOUS at 11:12

## 2020-12-10 RX ADMIN — SODIUM CHLORIDE, SODIUM LACTATE, POTASSIUM CHLORIDE, AND CALCIUM CHLORIDE: .6; .31; .03; .02 INJECTION, SOLUTION INTRAVENOUS at 10:12

## 2020-12-10 RX ADMIN — IOHEXOL 75 ML: 350 INJECTION, SOLUTION INTRAVENOUS at 09:12

## 2020-12-10 RX ADMIN — MUPIROCIN: 20 OINTMENT TOPICAL at 11:12

## 2020-12-10 RX ADMIN — PIPERACILLIN AND TAZOBACTAM 4.5 G: 4; .5 INJECTION, POWDER, LYOPHILIZED, FOR SOLUTION INTRAVENOUS; PARENTERAL at 02:12

## 2020-12-10 NOTE — CONSULTS
Ostomy care was consulted for ileostomy and urostomy care.   This patient was assessed yesterday and recommendations/orders written.  Ostomy care will follow-up with prn.  Secure chat to Dr. HOLLIS Francisco.  No additional orders.   ANH Peterson RN, [facility]  [phone]

## 2020-12-10 NOTE — PROGRESS NOTES
Patient arrived to [address] with rapid response team. VS continuously monitored, patient on NRB on arrival, O2 sats 100%, tachycardic, and tachypneic. Afebrile. SICU team to bedside to assess patient. New orders placed. Patient transitioned to 2L NC, SpO2 100%. See flowsheet for full assessment, will continue to monitor patient.

## 2020-12-10 NOTE — H&P
[facility]  Critical Care - Surgery  History & Physical    Patient Name: Edita Riley  MRN: [id number]  Admission Date: 12/8/2020  Code Status: Full Code  Attending Physician: Hammad Reynolds MD   Primary Care Provider: Primary Doctor No   Principal Problem: <principal problem not specified>    Subjective:     HPI:  55 y.o. female with history of cervical cancer s/p radiation with resulting BL ureteral strictures s/p BL nephrostomy tube placement. She underwent creation of a transverse colon urinary conduit on 9/11/2020 which was complicated by bowel perforation and underwent consequent extended right hemicolectomy and ileostomy creation on 9/17/2020. This was further complicated by a pelvic collection that was drained by IR and treated with longterm IV antibiotics and antifungals. She was discharged from the hospital to an LTAC on 10/13/2020. In the interim she was found to have LE DVT and was started on therapeutic anticoagulation. She was readmitted on 12/8 after developing worsening nausea/vomiting and dysphagia. She was brought to pre-op this AM to undergo EGD for evaluation, however, on arrival she became tachypnic and had decreased O2 sats to 88% and was placed on mask on 10L oxygen. She was taken for STAT CTA and transferred to the unit for presumed PE. She arrived on 1L NC satting 100% and HDS. RR in 30s-40. She states that she developed extreme anxiety at the time she was brought to pre-op. She has history of DVT, previous stroke, anxiety, and panic attacks. Patient states she has some pain at her left thigh. Her morning dose of therapeutic lovenox was held for EGD today.     Hospital/ICU Course:   12/10/2020: Arrived to SICU on 1L NC, O2 sats 100%. HDS. Tachypnic. CTA negative for PE. Given morning dose of therapeutic lovenox.    Follow-up For: Procedure(s) (LRB):  EGD (ESOPHAGOGASTRODUODENOSCOPY) (N/A)    Post-Operative Day: Day of Surgery     Past Medical History:    Diagnosis Date    Abnormal mammogram 8/25/2020    Anxiety     Cervical cancer 2014    Chronic back pain     Depression     Diarrhea due to malabsorption 11/14/2018    Fibromyalgia     Generalized abdominal pain 8/25/2020    GERD (gastroesophageal reflux disease)     Hiatal hernia 2014    History of cervical cancer 10/11/2018    History of cervical cancer 10/11/2018    Hx of psychiatric care     Cymbalta, trazodone    Hypertension     Hypomagnesemia 11/21/2018    Lactose intolerance     Metastatic squamous cell carcinoma to lymph node 10/11/2018    Nephrostomy tube displaced     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 drug 11/14/2018    Osteoarthritis of back     Psychiatric problem     Stroke 1972       Past Surgical History:   Procedure Laterality Date    ANTEGRADE NEPHROSTOGRAPHY Bilateral 9/28/2020    Procedure: Nephrostogram - antegrade;  Surgeon: Leslie Balbuena MD;  Location: [facility] OR [address] [place] [address];  Service: Urology;  Laterality: Bilateral;    BILATERAL OOPHORECTOMY  2015    CHOLECYSTECTOMY  11/09/2016    Done at [facility], path showed chronic cholecystitis and gallstones    cold knife conization  2014    COLECTOMY, RIGHT  9/17/2020    Procedure: COLECTOMY, RIGHT Extended;  Surgeon: Hammad Reynolds MD;  Location: [facility] OR [address] [place] [address];  Service: Colon and Rectal;;    COLONOSCOPY  2014    COLONOSCOPY N/A 10/24/2016    at [facility], Dr Gutiérrez    COLONOSCOPY N/A 5/18/2018    Procedure: COLONOSCOPY;  Surgeon: Arden Gutiérrez MD;  [facility]  Service: Endoscopy;  Laterality: N/A;    COLONOSCOPY N/A 7/28/2020    Procedure: COLONOSCOPY;  Surgeon: Hammad Reynolds MD;  Location: [facility] (4TH FLR);  Service: Colon and Rectal;  Laterality: N/A;  covid test [place] 7/25    CYSTOSCOPY WITH URETEROSCOPY, RETROGRADE PYELOGRAPHY, AND INSERTION OF STENT Bilateral 3/21/2020    Procedure: CYSTOSCOPY, WITH RETROGRADE PYELOGRAM,;  Surgeon: Leslie Balbuena MD;  Location: [facility] OR [address] [place] [address];   Service: Urology;  Laterality: Bilateral;    ESOPHAGOGASTRODUODENOSCOPY  2014    ESOPHAGOGASTRODUODENOSCOPY  11/18/2020    ESOPHAGOGASTRODUODENOSCOPY N/A 11/18/2020    Procedure: ESOPHAGOGASTRODUODENOSCOPY (EGD);  Surgeon: Zenon Spencer MD;  Location: [facility] ENDO;  Service: Endoscopy;  Laterality: N/A;    HYSTERECTOMY  2014    [facility] for cervical cancer    ILEOSTOMY  9/17/2020    Procedure: CREATION, ILEOSTOMY;  Surgeon: Hammad Reynolds MD;  Location: [facility] OR 2ND FLR;  Service: Colon and Rectal;;    MOBILIZATION OF SPLENIC FLEXURE N/A 9/11/2020    Procedure: MOBILIZATION, COLONIC;  Surgeon: Hammad Reynolds MD;  Location: [facility] OR 2ND FLR;  Service: Colon and Rectal;  Laterality: N/A;    OOPHORECTOMY      RETROPERITONEAL LYMPHADENECTOMY Right 9/17/2018    Procedure: LYMPHADENECTOMY, RETROPERITONEUM;  Surgeon: Sebas Reed MD;  Location: [facility] OR [facility] FLR;  Service: General;  Laterality: Right;  ILIAC       Review of patient's allergies indicates:   Allergen Reactions    Bee sting [allergen ext-venom-honey bee]      Rash      Grass pollen-bermuda, standard      rash       Family History     Problem Relation (Age of Onset)    Breast cancer Maternal Aunt    Cancer Father, Mother    Cervical cancer Mother    Colon cancer Father    Drug abuse Daughter, Daughter    Esophageal cancer Father    Heart disease Sister, Maternal Grandmother    Suicide Daughter        Tobacco Use    Smoking status: Former Smoker    Smokeless tobacco: Never Used   Substance and Sexual Activity    Alcohol use: No     Alcohol/week: 0.0 standard drinks    Drug use: No    Sexual activity: Yes     Partners: Male     Birth control/protection: None     Comment:  19 years since 1999      Review of Systems   Constitutional: Negative for activity change, diaphoresis and fatigue.   HENT: Negative for congestion, drooling, sinus pressure and trouble swallowing.    Eyes: Negative for visual disturbance.   Respiratory: Positive for  shortness of breath. Negative for cough, chest tightness and wheezing.    Cardiovascular: Negative for chest pain and leg swelling.   Gastrointestinal: Negative for abdominal distention, abdominal pain, nausea and vomiting.   Musculoskeletal: Negative for back pain.   Neurological: Negative for dizziness, facial asymmetry, speech difficulty, weakness, light-headedness and headaches.   Psychiatric/Behavioral:        Anxious     Objective:     Vital Signs (Most Recent):  Temp: 97.4 °F (36.3 °C) (12/10/20 1000)  Pulse: 102 (12/10/20 1000)  Resp: (!) 26 (12/10/20 1000)  BP: (!) 101/57 (12/10/20 1000)  SpO2: 100 % (12/10/20 1000) Vital Signs (24h Range):  Temp:  [96.5 °F (35.8 °C)-98.2 °F (36.8 °C)] 97.4 °F (36.3 °C)  Pulse:  [] 102  Resp:  [16-35] 26  SpO2:  [88 %-100 %] 100 %  BP: ()/(54-78) 101/57     Weight: 70.8 kg (156 lb)  Body mass index is 23.04 kg/m².      Intake/Output Summary (Last 24 hours) at 12/10/2020 1028  Last data filed at 12/10/2020 0600  Gross per 24 hour   Intake 1205 ml   Output 1000 ml   Net 205 ml       Physical Exam  Constitutional:       General: She is in acute distress.   HENT:      Head: Normocephalic and atraumatic.      Right Ear: External ear normal.      Left Ear: External ear normal.      Nose: Nose normal.      Mouth/Throat:      Mouth: Mucous membranes are moist.   Eyes:      Extraocular Movements: Extraocular movements intact.      Conjunctiva/sclera: Conjunctivae normal.      Pupils: Pupils are equal, round, and reactive to light.   Neck:      Musculoskeletal: Normal range of motion.   Cardiovascular:      Rate and Rhythm: Regular rhythm. Tachycardia present.      Pulses: Normal pulses.      Heart sounds: Normal heart sounds.   Pulmonary:      Effort: Pulmonary effort is normal.      Breath sounds: Normal breath sounds.      Comments: tachypnic  Abdominal:      General: There is no distension.      Palpations: Abdomen is soft.      Tenderness: There is no abdominal  tenderness.   Musculoskeletal: Normal range of motion.   Skin:     General: Skin is warm and dry.   Neurological:      General: No focal deficit present.      Mental Status: She is alert and oriented to person, place, and time. Mental status is at baseline.      Motor: No weakness.   Psychiatric:         Behavior: Behavior normal.      Comments: anxious         Vents:       Lines/Drains/Airways     Drain                 Urostomy 09/11/20 ileal conduit LLQ 90 days         Ileostomy 09/18/20 RLQ 83 days          Peripheral Intravenous Line                 Peripheral IV - Single Lumen 12/08/20 0000 20 G Right Antecubital 2 days                Significant Labs:    CBC/Anemia Profile:  Recent Labs   Lab 12/08/20  1701 12/09/20  0407 12/10/20  0410   WBC 11.88 10.52 6.17  6.17   HGB 12.2 11.2* 9.6*  9.6*   HCT 42.3 37.7 32.7*  32.7*    404* 346  346   MCV 93 94 93  93   RDW 20.3* 20.2* 20.7*  20.7*        Chemistries:  Recent Labs   Lab 12/08/20  2033 12/09/20  0407 12/10/20  0410    142 146*  146*   K 3.8 3.5 3.3*  3.3*   * 112* 117*  117*   CO2 15* 18* 17*  17*   BUN 35* 32* 24*  24*   CREATININE 1.6* 1.5* 1.4  1.4   CALCIUM 10.2 10.4 9.9  9.9   ALBUMIN 2.9*  --   --    PROT 6.9  --   --    BILITOT 0.3  --   --    ALKPHOS 236*  --   --    ALT 41  --   --    AST 23  --   --    MG  --  1.9  --    PHOS  --  4.6*  --        ABGs:   Recent Labs   Lab 12/10/20  0921   PH 7.395   PCO2 27.6*   HCO3 16.9*   POCSATURATED 100   BE -8     Cardiac Markers: No results for input(s): CKMB, TROPONINT, MYOGLOBIN in the last 48 hours.  Troponin: No results for input(s): TROPONINI in the last 48 hours.    Significant Imaging: I have reviewed and interpreted all pertinent imaging results/findings within the past 24 hours.   CTA Chest 12/10/2020:  Impression:     No apparent evidence of a pulmonary embolism.     Findings indicating bronchitis.     Mild diffuse emphysematous changes.        Electronically  signed by: Chirag Madrid  Date:                                            12/10/2020  Time:                                           10:02            Assessment/Plan:     Active Diagnoses:    Diagnosis Date Noted POA    FTT (failure to thrive) in adult [R62.7] 12/09/2020 Yes    Acute deep vein thrombosis (DVT) of lower extremity [I82.409] 12/09/2020 Yes    Colostomy care [Z43.3]  Not Applicable    Hypovolemia [E86.1] 12/08/2020 Yes    Bilateral ureteral obstruction [N13.5] 09/11/2020 Yes     Chronic    Gastroesophageal reflux disease with esophagitis [K21.00] 11/16/2017 Yes     Chronic    Impaired functional mobility, balance, gait, and endurance [Z74.09] 07/24/2015 Yes     Chronic    Essential hypertension [I10] 05/04/2015 Yes     Chronic      Problems Resolved During this Admission:     Ms. Riley is a 55 y.o. female with history of cervical cancer s/p radiation with resulting BL ureteral strictures s/p BL nephrostomy tube placement. She underwent creation of a transverse colon urinary conduit on 9/11/2020 which was complicated by bowel perforation and underwent consequent extended right hemicolectomy and ileostomy creation on 9/17/2020 complicated by drainage of pelvic abscess and longterm abx/antifungals and further complicated by LE DVT development and failure to thrive. Stepped up to ICU for decline in respiratory status concerning for PE in setting of DVT history. Workup has been negative thus far for PE or other cardiac event and patient is HDS. Patient endorses extreme anxiety and states previous panic attacks similar to current episode.       Neuro/Psych:   -- Sedation: none  -- Pain: tylenol prn  -- one time 0.5 ativan for anxiety              Cards:   -- HDS  -- Restart therapeutic lovenox 70 BID  -- F/u troponins   -- EKG after event does not show acute infarct      Pulm:   -- on 1L NC  -- Goal O2 sat > 90%  -- Wean as able      Renal:  -- Daily CMP      FEN / GI:   -- Replace lytes  as needed  -- Nutrition: NPO  -- Ostomy care consulted for stoma care and pouching      ID:   -- Tm: afebrile; WBC WNL  -- Abx discontinued earlier this AM      Heme/Onc:   -- H/H stable   -- Daily CBC  -- f/u CBC and coags, d-dimer      Endo:   -- Gluc goal 140-180      PPx:   Feeding: NPO  Thromboembolic prevention: therapeutic lovenox  HOB >30: yes  Glucose control: Critical care goal 140-180 g/dl, ISS      Dispo/Code Status/Palliative:   -- SICU / Full Code  -- Patient does not require ICU care at this time. Stepdown to [facility] if workup remains negative and patient remains HDS.        Critical Care Daily Checklist:    A: Awake: RASS Goal/Actual Goal:    Actual:     B: Spontaneous Breathing Trial Performed?     C: SAT & SBT Coordinated?  n/a                      D: Delirium: CAM-ICU     E: Early Mobility Performed? Yes   F: Feeding Goal:    Status:     Current Diet Order   Procedures    Diet NPO Except for: Sips with Medication     Order Specific Question:   Except for     Answer:   Sips with Medication      AS: Analgesia/Sedation none   T: Thromboembolic Prophylaxis Therapeutic lovenox   H: HOB > 300 Yes   U: Stress Ulcer Prophylaxis (if needed) none   G: Glucose Control    B: Bowel Function Stool Occurrence: 0   I: Indwelling Catheter (Lines & Little) Necessity    D: De-escalation of Antimicrobials/Pharmacotherapies     Plan for the day/ETD     Code Status:  Family/Goals of Care: Full Code        Critical care was time spent personally by me on the following activities: development of treatment plan with patient or surrogate and bedside caregivers, discussions with consultants, evaluation of patient's response to treatment, examination of patient, ordering and performing treatments and interventions, ordering and review of laboratory studies, ordering and review of radiographic studies, pulse oximetry, re-evaluation of patient's condition.  This critical care time did not overlap with that of any other  provider or involve time for any procedures.     Cecile Francisco MD  Critical Care - Surgery  [facility]

## 2020-12-10 NOTE — PROGRESS NOTES
"RAPID RESPONSE NURSE NOTE     Admit Date: 2020  LOS: 2  Code Status: Full Code   Date of Consult: 12/10/2020  : 1965  Age: 55 y.o.  Weight:   Wt Readings from Last 1 Encounters:   20 70.8 kg (156 lb)     Sex: female  Race: Black or    Bed: 62967/70077 A:   MRN: [id number]  Time Rapid Response Team page Received: 0908  Time Rapid Response Team at Bedside: 0912  Time Rapid Response Team left Bedside: 1000  Was the patient discharged from an ICU this admission?   no  Was the patient discharged from a PACU within last 24 hours?  no  Did the patient receive conscious sedation/general anesthesia within last 24 hours?  no  Was the patient in the ED within the past 24 hours?  no  Was the patient started on NIPPV within the past 24 hours?  no  Did this progress into an ARC or CPA:  no  Attending Physician: Hammad Reynolds MD  Primary Service: Networked reference to record PCT   Consult Requested By: Hammad Reynolds MD     SITUATION     Notified by overhead page.  Reason for alert: SOB   Called to evaluate the patient for Circulatory    BACKGROUND     Why is the patient in the hospital?: Colostomy care    Patient has a past medical history of Abnormal mammogram, Anxiety, Cervical cancer, Chronic back pain, Depression, Diarrhea due to malabsorption, Fibromyalgia, Generalized abdominal pain, GERD (gastroesophageal reflux disease), Hiatal hernia, History of cervical cancer, History of cervical cancer, psychiatric care, Hypertension, Hypomagnesemia, Lactose intolerance, Metastatic squamous cell carcinoma to lymph node, Nephrostomy tube displaced, 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 drug, Osteoarthritis of back, Psychiatric problem, and Stroke.    ASSESSMENT/INTERVENTIONS     BP 99/63   Pulse 92   Temp 97.4 °F (36.3 °C) (Oral)   Resp 14   Ht 5' 9" (1.753 m)   Wt 70.8 kg (156 lb)   LMP 2014 (Approximate)   SpO2 100%   BMI 23.04 kg/m²     What did you find: Rapid RN came on to shift and " joined a Rapid that was occurring on Endoscopy. The patient was displaying signs of SOB and CTA of the chest was ordered per the primary team. Patient was transferred to [facility] and CTA results pending. STEFANIE Marie RN to assume care.     RECOMMENDATIONS     We recommend: ICU higher level of care.     FOLLOW-UP/CONTINGENCY PLAN     Call the Rapid Response Nurse, John Martin RN at x [phone] for additional questions or concerns.    PHYSICIAN ESCALATION     Orders received and case discussed with ANA Castillo NP.    Disposition: Tx in ICU bed 89617.

## 2020-12-10 NOTE — PLAN OF CARE
"      SICU PLAN OF CARE NOTE    Dx: Colostomy care    Shift Events: Transferred with rapid response, see note. CT with no evidence of PE. Trending troponins. Patient verbalized to this RN hx of anxiety and that she felt like she was having a panic attack before EGD. One time dose ativan given on arrival, patient resting comfortably through shift. Stepdown orders placed     Goals of Care: VSS, on room air-1L NC.     Neuro: AAO x4, Follows Commands, and Moves All Extremities    Vital Signs: BP (!) 117/56 (BP Location: Left arm, Patient Position: Lying)   Pulse 92   Temp 97.7 °F (36.5 °C) (Oral)   Resp 15   Ht 5' 9" (1.753 m)   Wt 70.8 kg (156 lb)   LMP 06/08/2014 (Approximate)   SpO2 100%   BMI 23.04 kg/m²     Respiratory: Room Air    Diet: NPO and Sips with Meds    Gtts: MIVF, LR @ 40    Urine Output: Nephrostomy ~600cc cc/shift    Drains: Ostomy, total output ~50 cc /  shift     Labs/Accuchecks: Labs drawn on arrival, ABG stable, BG stable - no accuchecks ordered. Troponing slightly elevated, but trending down.     Skin: Sacral foam and heel boots in place. Patient turning self.        "

## 2020-12-10 NOTE — PLAN OF CARE
12/10/20 1316   Discharge Reassessment   Assessment Type Discharge Planning Reassessment   Provided patient/caregiver education on the expected discharge date and the discharge plan Yes   Do you have any problems affording any of your prescribed medications? No   Discharge Plan A Home with family   Discharge Plan B Home Health   DME Needed Upon Discharge  other (see comments)  (TBD)       Vanessa Hinson MPH, RN, CM  Ext. [phone]

## 2020-12-10 NOTE — PLAN OF CARE
Pt AAOx4. VSS on RA. NPO. Emesis x1 this AM. Denies need for anti-emetics when asked. Denies pain. IVF and IV abx infusing per MAR. RLQ ileostomy WNL with liquid brown output. Changed bag x1. LLQ urostomy WNL with adequate amt of yellow urine. Excoriation noted around stomas. Pt sleeping between care. Call light within reach, bed low and locked. WCTM.

## 2020-12-10 NOTE — PLAN OF CARE
POC reviewed with patient, VSS on room air.  AAOx4.  IVFs LR at 100ml/hr. Very excoriated skin noted around both stomas. Ostomy bags changed  by wound care team and again by the nurse; not able to make tight seal due to shape of abdomen, folds, skin irritation. No complaints of pain, nausea or vomiting.  UOP adequate from urostomy tube. Side rails upX2, bed low, locked, call light in reach

## 2020-12-10 NOTE — CODE/ RAPID DOCUMENTATION
Rapid Response Respiratory Therapy Note      Code Status: Full Code   : 1965  Bed: 19732/99446 A:   MRN: [id number]  Time page Received: 0909  Time Rapid Response RT at Bedside: 0910  Time Rapid Response RT left Bedside: 1010  Report given to: Michelle    SITUATION     Evaluated patient for: Rapid    BACKGROUND     Patient has a past medical history of Abnormal mammogram, Anxiety, Cervical cancer, Chronic back pain, Depression, Diarrhea due to malabsorption, Fibromyalgia, Generalized abdominal pain, GERD (gastroesophageal reflux disease), Hiatal hernia, History of cervical cancer, History of cervical cancer, psychiatric care, Hypertension, Hypomagnesemia, Lactose intolerance, Metastatic squamous cell carcinoma to lymph node, Nephrostomy tube displaced, 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 drug, Osteoarthritis of back, Psychiatric problem, and Stroke.    ASSESSMENT/INTERVENTIONS     Upon arrival in room Pt was tachypenic while on simple mask 10L.     Pulse: 99 Respiratory rate: 35 BP: BP: (!) 101/57 SpO2:100  Level of Consciousness: Level of Consciousness (AVPU): alert  Respiratory Effort: Respiratory Effort: Unlabored, Normal  Expansion/Accessory Muscle Usage: Expansion/Accessory Muscles/Retractions: no use of accessory muscles, no retractions, expansion symmetric  All Lung Field Breath Sounds: All Lung Fields Breath Sounds: Anterior:, Lateral:, diminished  O2 Device/Concentration: Simple mask 10L  Most recent blood gas:   Recent Labs     20  0600 12/10/20  0921   PH  --  7.395   PCO2  --  27.6*   PO2  --  229*   HCO3  --  16.9*   POCSATURATED  --  100   BE  --  -8   LACTATE 1.9  --      Current Respiratory Care Orders:   20 0800  Pulse Oximetry Q4H Every 4 hours (11 of 40 released)    Release    20 0607       NIPPV: No Surgical airway: No Vent: No  ETCO2 monitored:    Ambu at bedside: Ambu bag with the patient?: Yes, Adult Ambu    RECOMMENDATIONS   ?  We recommend: Continue to monitor.      ESCALATION      Discussed plan of care with   and primary RT, .     FOLLOW-UP     Disposition: Tx in ICU bed 81248.    Please call back the Rapid Response RT, Yudelka Holder, RRT at x [phone] for any questions or concerns.

## 2020-12-11 ENCOUNTER — ANESTHESIA (OUTPATIENT)
Dept: ENDOSCOPY | Facility: HOSPITAL | Age: 55
DRG: 392 | End: 2020-12-11
Payer: MEDICAID

## 2020-12-11 ENCOUNTER — ANESTHESIA EVENT (OUTPATIENT)
Dept: ENDOSCOPY | Facility: HOSPITAL | Age: 55
DRG: 392 | End: 2020-12-11
Payer: MEDICAID

## 2020-12-11 LAB
ANION GAP SERPL CALC-SCNC: 12 MMOL/L (ref 8–16)
BASOPHILS # BLD AUTO: 0.04 K/UL (ref 0–0.2)
BASOPHILS NFR BLD: 0.8 % (ref 0–1.9)
BUN SERPL-MCNC: 18 MG/DL (ref 6–20)
CALCIUM SERPL-MCNC: 10 MG/DL (ref 8.7–10.5)
CHLORIDE SERPL-SCNC: 118 MMOL/L (ref 95–110)
CO2 SERPL-SCNC: 16 MMOL/L (ref 23–29)
CREAT SERPL-MCNC: 1.1 MG/DL (ref 0.5–1.4)
CRP SERPL-MCNC: 4.3 MG/L (ref 0–8.2)
DIFFERENTIAL METHOD: ABNORMAL
EOSINOPHIL # BLD AUTO: 0.2 K/UL (ref 0–0.5)
EOSINOPHIL NFR BLD: 3.2 % (ref 0–8)
ERYTHROCYTE [DISTWIDTH] IN BLOOD BY AUTOMATED COUNT: 20.9 % (ref 11.5–14.5)
EST. GFR  (AFRICAN AMERICAN): >60 ML/MIN/1.73 M^2
EST. GFR  (NON AFRICAN AMERICAN): 55.9 ML/MIN/1.73 M^2
GLUCOSE SERPL-MCNC: 83 MG/DL (ref 70–110)
HCT VFR BLD AUTO: 31.2 % (ref 37–48.5)
HGB BLD-MCNC: 9.1 G/DL (ref 12–16)
IMM GRANULOCYTES # BLD AUTO: 0.01 K/UL (ref 0–0.04)
IMM GRANULOCYTES NFR BLD AUTO: 0.2 % (ref 0–0.5)
LYMPHOCYTES # BLD AUTO: 1.6 K/UL (ref 1–4.8)
LYMPHOCYTES NFR BLD: 29.1 % (ref 18–48)
MAGNESIUM SERPL-MCNC: 1.7 MG/DL (ref 1.6–2.6)
MCH RBC QN AUTO: 27.7 PG (ref 27–31)
MCHC RBC AUTO-ENTMCNC: 29.2 G/DL (ref 32–36)
MCV RBC AUTO: 95 FL (ref 82–98)
MONOCYTES # BLD AUTO: 1 K/UL (ref 0.3–1)
MONOCYTES NFR BLD: 18 % (ref 4–15)
NEUTROPHILS # BLD AUTO: 2.6 K/UL (ref 1.8–7.7)
NEUTROPHILS NFR BLD: 48.7 % (ref 38–73)
NRBC BLD-RTO: 0 /100 WBC
PHOSPHATE SERPL-MCNC: 3.6 MG/DL (ref 2.7–4.5)
PLATELET # BLD AUTO: 334 K/UL (ref 150–350)
PMV BLD AUTO: 10.8 FL (ref 9.2–12.9)
POTASSIUM SERPL-SCNC: 3.1 MMOL/L (ref 3.5–5.1)
RBC # BLD AUTO: 3.29 M/UL (ref 4–5.4)
SODIUM SERPL-SCNC: 146 MMOL/L (ref 136–145)
WBC # BLD AUTO: 5.32 K/UL (ref 3.9–12.7)

## 2020-12-11 PROCEDURE — 94761 N-INVAS EAR/PLS OXIMETRY MLT: CPT

## 2020-12-11 PROCEDURE — 88305 TISSUE EXAM BY PATHOLOGIST: CPT | Mod: 26,,, | Performed by: PATHOLOGY

## 2020-12-11 PROCEDURE — 25000003 PHARM REV CODE 250: Performed by: STUDENT IN AN ORGANIZED HEALTH CARE EDUCATION/TRAINING PROGRAM

## 2020-12-11 PROCEDURE — 84100 ASSAY OF PHOSPHORUS: CPT

## 2020-12-11 PROCEDURE — 63600175 PHARM REV CODE 636 W HCPCS: Performed by: STUDENT IN AN ORGANIZED HEALTH CARE EDUCATION/TRAINING PROGRAM

## 2020-12-11 PROCEDURE — 43239 EGD BIOPSY SINGLE/MULTIPLE: CPT | Performed by: INTERNAL MEDICINE

## 2020-12-11 PROCEDURE — 43239 PR EGD, FLEX, W/BIOPSY, SGL/MULTI: ICD-10-PCS | Mod: ,,, | Performed by: INTERNAL MEDICINE

## 2020-12-11 PROCEDURE — D9220A PRA ANESTHESIA: Mod: ANES,,, | Performed by: ANESTHESIOLOGY

## 2020-12-11 PROCEDURE — 86140 C-REACTIVE PROTEIN: CPT

## 2020-12-11 PROCEDURE — 88305 TISSUE EXAM BY PATHOLOGIST: CPT | Performed by: PATHOLOGY

## 2020-12-11 PROCEDURE — 88305 TISSUE EXAM BY PATHOLOGIST: ICD-10-PCS | Mod: 26,,, | Performed by: PATHOLOGY

## 2020-12-11 PROCEDURE — 85025 COMPLETE CBC W/AUTO DIFF WBC: CPT

## 2020-12-11 PROCEDURE — 25000003 PHARM REV CODE 250: Performed by: NURSE PRACTITIONER

## 2020-12-11 PROCEDURE — 20600001 HC STEP DOWN PRIVATE ROOM

## 2020-12-11 PROCEDURE — 37000008 HC ANESTHESIA 1ST 15 MINUTES: Performed by: INTERNAL MEDICINE

## 2020-12-11 PROCEDURE — 25000003 PHARM REV CODE 250: Performed by: NURSE ANESTHETIST, CERTIFIED REGISTERED

## 2020-12-11 PROCEDURE — 83735 ASSAY OF MAGNESIUM: CPT

## 2020-12-11 PROCEDURE — D9220A PRA ANESTHESIA: Mod: CRNA,,, | Performed by: NURSE ANESTHETIST, CERTIFIED REGISTERED

## 2020-12-11 PROCEDURE — 43239 EGD BIOPSY SINGLE/MULTIPLE: CPT | Mod: ,,, | Performed by: INTERNAL MEDICINE

## 2020-12-11 PROCEDURE — D9220A PRA ANESTHESIA: ICD-10-PCS | Mod: CRNA,,, | Performed by: NURSE ANESTHETIST, CERTIFIED REGISTERED

## 2020-12-11 PROCEDURE — 27201012 HC FORCEPS, HOT/COLD, DISP: Performed by: INTERNAL MEDICINE

## 2020-12-11 PROCEDURE — D9220A PRA ANESTHESIA: ICD-10-PCS | Mod: ANES,,, | Performed by: ANESTHESIOLOGY

## 2020-12-11 PROCEDURE — 80048 BASIC METABOLIC PNL TOTAL CA: CPT

## 2020-12-11 PROCEDURE — 63600175 PHARM REV CODE 636 W HCPCS: Performed by: NURSE ANESTHETIST, CERTIFIED REGISTERED

## 2020-12-11 PROCEDURE — 37000009 HC ANESTHESIA EA ADD 15 MINS: Performed by: INTERNAL MEDICINE

## 2020-12-11 RX ORDER — MAGNESIUM SULFATE HEPTAHYDRATE 40 MG/ML
2 INJECTION, SOLUTION INTRAVENOUS ONCE
Status: COMPLETED | OUTPATIENT
Start: 2020-12-11 | End: 2020-12-11

## 2020-12-11 RX ORDER — PANTOPRAZOLE SODIUM 40 MG/1
40 TABLET, DELAYED RELEASE ORAL DAILY
Status: DISCONTINUED | OUTPATIENT
Start: 2020-12-11 | End: 2020-12-11

## 2020-12-11 RX ORDER — PROPOFOL 10 MG/ML
VIAL (ML) INTRAVENOUS CONTINUOUS PRN
Status: DISCONTINUED | OUTPATIENT
Start: 2020-12-11 | End: 2020-12-11

## 2020-12-11 RX ORDER — LORAZEPAM 0.5 MG/1
0.5 TABLET ORAL EVERY 8 HOURS PRN
Status: DISCONTINUED | OUTPATIENT
Start: 2020-12-11 | End: 2020-12-16 | Stop reason: HOSPADM

## 2020-12-11 RX ORDER — HYDROMORPHONE HYDROCHLORIDE 1 MG/ML
0.2 INJECTION, SOLUTION INTRAMUSCULAR; INTRAVENOUS; SUBCUTANEOUS EVERY 5 MIN PRN
Status: DISCONTINUED | OUTPATIENT
Start: 2020-12-11 | End: 2020-12-11 | Stop reason: HOSPADM

## 2020-12-11 RX ORDER — FENTANYL CITRATE 50 UG/ML
25 INJECTION, SOLUTION INTRAMUSCULAR; INTRAVENOUS EVERY 5 MIN PRN
Status: DISCONTINUED | OUTPATIENT
Start: 2020-12-11 | End: 2020-12-11 | Stop reason: HOSPADM

## 2020-12-11 RX ORDER — LIDOCAINE HYDROCHLORIDE 20 MG/ML
INJECTION INTRAVENOUS
Status: DISCONTINUED | OUTPATIENT
Start: 2020-12-11 | End: 2020-12-11

## 2020-12-11 RX ORDER — ONDANSETRON 2 MG/ML
4 INJECTION INTRAMUSCULAR; INTRAVENOUS ONCE AS NEEDED
Status: DISCONTINUED | OUTPATIENT
Start: 2020-12-11 | End: 2020-12-11 | Stop reason: HOSPADM

## 2020-12-11 RX ORDER — PANTOPRAZOLE SODIUM 40 MG/1
40 TABLET, DELAYED RELEASE ORAL 2 TIMES DAILY
Status: DISCONTINUED | OUTPATIENT
Start: 2020-12-12 | End: 2020-12-16 | Stop reason: HOSPADM

## 2020-12-11 RX ORDER — PROPOFOL 10 MG/ML
VIAL (ML) INTRAVENOUS
Status: DISCONTINUED | OUTPATIENT
Start: 2020-12-11 | End: 2020-12-11

## 2020-12-11 RX ORDER — POTASSIUM CHLORIDE 7.45 MG/ML
80 INJECTION INTRAVENOUS ONCE
Status: COMPLETED | OUTPATIENT
Start: 2020-12-11 | End: 2020-12-11

## 2020-12-11 RX ORDER — DIPHENHYDRAMINE HYDROCHLORIDE 50 MG/ML
25 INJECTION INTRAMUSCULAR; INTRAVENOUS EVERY 6 HOURS PRN
Status: DISCONTINUED | OUTPATIENT
Start: 2020-12-11 | End: 2020-12-11 | Stop reason: HOSPADM

## 2020-12-11 RX ADMIN — PROPOFOL 175 MCG/KG/MIN: 10 INJECTION, EMULSION INTRAVENOUS at 12:12

## 2020-12-11 RX ADMIN — LIDOCAINE HYDROCHLORIDE 75 MG: 20 INJECTION, SOLUTION INTRAVENOUS at 12:12

## 2020-12-11 RX ADMIN — MUPIROCIN: 20 OINTMENT TOPICAL at 09:12

## 2020-12-11 RX ADMIN — PROPOFOL 50 MG: 10 INJECTION, EMULSION INTRAVENOUS at 12:12

## 2020-12-11 RX ADMIN — POTASSIUM CHLORIDE 80 MEQ: 7.46 INJECTION, SOLUTION INTRAVENOUS at 09:12

## 2020-12-11 RX ADMIN — SODIUM CHLORIDE, SODIUM LACTATE, POTASSIUM CHLORIDE, AND CALCIUM CHLORIDE: .6; .31; .03; .02 INJECTION, SOLUTION INTRAVENOUS at 02:12

## 2020-12-11 RX ADMIN — ENOXAPARIN SODIUM 70 MG: 80 INJECTION SUBCUTANEOUS at 09:12

## 2020-12-11 RX ADMIN — GABAPENTIN 100 MG: 100 CAPSULE ORAL at 09:12

## 2020-12-11 RX ADMIN — MAGNESIUM SULFATE IN WATER 2 G: 40 INJECTION, SOLUTION INTRAVENOUS at 09:12

## 2020-12-11 RX ADMIN — LORAZEPAM 0.5 MG: 0.5 TABLET ORAL at 09:12

## 2020-12-11 RX ADMIN — PANTOPRAZOLE SODIUM 40 MG: 40 TABLET, DELAYED RELEASE ORAL at 04:12

## 2020-12-11 RX ADMIN — ONDANSETRON 8 MG: 2 INJECTION INTRAMUSCULAR; INTRAVENOUS at 06:12

## 2020-12-11 NOTE — PROGRESS NOTES
Patient transferred to [address] [place] [address], transferred via wheelchair with this RN and ORLANDO Galan. Vitals stable on continuous monitoring, patient transferred on room air. Chart received by  and this RN notified AMY Camarena of patient's arrival to room. Call light within reach, patient verbalized understanding on use.

## 2020-12-11 NOTE — NURSING TRANSFER
Nursing Transfer Note      12/11/2020      Transfer To: 1004    Transfer via stretcher    Transfer urostomy. ileostomy    Transported by pct    Medicines sent: no    Chart send with patient: Yes    Notified: family

## 2020-12-11 NOTE — [employer]ATION PATIENT INSTRUCTIONS
Discharge Summary/Instructions after an Endoscopic Procedure  Patient Name: Edita Riley  Patient MRN: [id number]  Patient YOB: 1965 Friday, December 11, 2020  Juancho Muse MD  RESTRICTIONS:  During your procedure today, you received medications for sedation.  These   medications may affect your judgment, balance and coordination.  Therefore,   for 24 hours, you have the following restrictions:   - DO NOT drive a car, operate machinery, make legal/financial decisions,   sign important papers or drink alcohol.    ACTIVITY:  Today: no heavy lifting, straining or running due to procedural   sedation/anesthesia.  The following day: return to full activity including work.  DIET:  Eat and drink normally unless instructed otherwise.     TREATMENT FOR COMMON SIDE EFFECTS:  - Mild abdominal pain, nausea, belching, bloating or excessive gas:  rest,   eat lightly and use a heating pad.  - Sore Throat: treat with throat lozenges and/or gargle with warm salt   water.  - Because air was used during the procedure, expelling large amounts of air   from your rectum or belching is normal.  - If a bowel prep was taken, you may not have a bowel movement for 1-3 days.    This is normal.  SYMPTOMS TO WATCH FOR AND REPORT TO YOUR PHYSICIAN:  1. Abdominal pain or bloating, other than gas cramps.  2. Chest pain.  3. Back pain.  4. Signs of infection such as: chills or fever occurring within 24 hours   after the procedure.  5. Rectal bleeding, which would show as bright red, maroon, or black stools.   (A tablespoon of blood from the rectum is not serious, especially if   hemorrhoids are present.)  6. Vomiting.  7. Weakness or dizziness.  GO DIRECTLY TO THE NEAREST EMERGENCY ROOM IF YOU HAVE ANY OF THE FOLLOWING:      Difficulty breathing              Chills and/or fever over 101 F   Persistent vomiting and/or vomiting blood   Severe abdominal pain   Severe chest pain   Black, tarry stools   Bleeding- more than one  tablespoon   Any other symptom or condition that you feel may need urgent attention  Your doctor recommends these additional instructions:  If any biopsies were taken, your doctors clinic will contact you in 1 to 2   weeks with any results.  - Return patient to [facility] for ongoing care.   - Advance diet as tolerated.   - Continue present medications.   - Resume Eliquis (apixaban) at prior dose today.   - Use Protonix (pantoprazole) 40 mg PO BID.  For questions, problems or results please call your physician - Juancho Muse MD at Work:  [phone].  [facility], EMERGENCY ROOM PHONE NUMBER: [phone]  IF A COMPLICATION OR EMERGENCY SITUATION ARISES AND YOU ARE UNABLE TO REACH   YOUR PHYSICIAN - GO DIRECTLY TO THE EMERGENCY ROOM.  Juancho Muse MD  12/11/2020 12:53:35 PM  This report has been verified and signed electronically.  [employer]

## 2020-12-11 NOTE — ASSESSMENT & PLAN NOTE
[facility] 55 [facility] with history of cervical cancer s/p radiation with resulting BL ureteral strictures s/p BL nephrostomy tube placement. On 9/11/20 she underwent creation of a transverse colon urinary conduit (Dr. Reynolds, Dr. Balbuena). Her post-operative course was complicated by a bowel perforation s/p extended right hemicolectomy and ileostomy creation (9/17/20). Subsequently she required IR drainage of a pelvic collection (9/23/20) and longterm IV antibiotics/antifungals. She was discharged from the hospital to an LTAC on 10/13/20, was in LTC for almost 60 days, home for 4 days, family having difficulty caring for pt, not eating at home and difficulty keeping appliance over ileostomy    Consult wound care  PT/OT  Calorie count

## 2020-12-11 NOTE — SUBJECTIVE & OBJECTIVE
"  Subjective:     Interval History: EGD not completed yesterday. Complaining of nausea and abdominal "uneasiness" Asking for sprite.    Post-Op Info:  Procedure(s) (LRB):  EGD (ESOPHAGOGASTRODUODENOSCOPY) (N/A)   1 Day Post-Op      Medications:  Continuous Infusions:   lactated ringers 40 mL/hr at 12/10/20 1700     Scheduled Meds:   enoxaparin  70 mg Subcutaneous Q12H    gabapentin  100 mg Oral QHS    mupirocin   Nasal BID     PRN Meds:   acetaminophen    naloxone    ondansetron    promethazine (PHENERGAN) IVPB    sodium chloride 0.9%        Objective:     Vital Signs (Most Recent):  Temp: 98 °F (36.7 °C) (12/11/20 0748)  Pulse: 107 (12/11/20 0748)  Resp: 16 (12/11/20 0748)  BP: (!) 126/57 (12/11/20 0748)  SpO2: 98 % (12/11/20 0748) Vital Signs (24h Range):  Temp:  [97.4 °F (36.3 °C)-98 °F (36.7 °C)] 98 °F (36.7 °C)  Pulse:  [] 107  Resp:  [9-33] 16  SpO2:  [96 %-100 %] 98 %  BP: ()/(44-63) 126/57     Intake/Output - Last 3 Shifts       12/09 0700 - 12/10 0659 12/10 0700 - 12/11 0659 12/11 0700 - 12/12 0659    P.O. 0 440     I.V. (mL/kg) 1105 (15.6) 280.6 (4)     Other 0      IV Piggyback 100      Total Intake(mL/kg) 1205 (17) 720.6 (10.2)     Urine (mL/kg/hr) 800 (0.5) 1000 (0.6)     Emesis/NG output 100      Stool 100 275     Total Output 1000 1275     Net +205 -554.4            Stool Occurrence 0 x      Emesis Occurrence 1 x            Physical Exam  Vitals signs and nursing note reviewed.   Constitutional:       Appearance: She is well-developed. She is not ill-appearing.   HENT:      Head: Normocephalic.   Eyes:      Pupils: Pupils are equal, round, and reactive to light.   Cardiovascular:      Rate and Rhythm: Normal rate and regular rhythm.      Heart sounds: Normal heart sounds.   Pulmonary:      Effort: Pulmonary effort is normal. No respiratory distress.      Breath sounds: Normal breath sounds. No wheezing or rales.   Abdominal:      Palpations: Abdomen is soft. There is no mass.      " Tenderness: There is abdominal tenderness. There is no guarding or rebound.      Comments: Stoma and urostomy functional  Inc line healing    Genitourinary:     Comments: aleah neprh draining   Skin:     General: Skin is warm and dry.   Neurological:      Mental Status: She is alert and oriented to person, place, and time.   Psychiatric:         Behavior: Behavior normal.         Thought Content: Thought content normal.         Judgment: Judgment normal.             Significant Labs:  BMP (Last 3 Results):   Recent Labs   Lab 12/09/20  0407 12/10/20  0410 12/10/20  1007 12/11/20  0438    95  95 95 83    146*  146* 146* 146*   K 3.5 3.3*  3.3* 3.6 3.1*   * 117*  117* 118* 118*   CO2 18* 17*  17* 16* 16*   BUN 32* 24*  24* 23* 18   CREATININE 1.5* 1.4  1.4 1.3 1.1   CALCIUM 10.4 9.9  9.9 9.5 10.0   MG 1.9  --  1.7 1.7     CBC (Last 3 Results):   Recent Labs   Lab 12/10/20  0410 12/10/20  1007 12/11/20  0438   WBC 6.17  6.17 5.90 5.32   RBC 3.52*  3.52* 3.47* 3.29*   HGB 9.6*  9.6* 9.5* 9.1*   HCT 32.7*  32.7* 32.2* 31.2*     346 351* 334   MCV 93  93 93 95   MCH 27.3  27.3 27.4 27.7   MCHC 29.4*  29.4* 29.5* 29.2*       Significant Diagnostics:  CT 12/8  No acute abnormality identified in the abdomen or pelvis.     Bilateral femoral vein and left iliac vein filling defects suggestive of DVTs, though mildly improved when compared with 11/04/2020.     Stable bilateral hydroureteronephrosis.  Postoperative changes of left lower quadrant transverse colon urinary conduit and right lower quadrant ileostomy.

## 2020-12-11 NOTE — PROGRESS NOTES
Ostomy care follow-up  Patient not in room to assess.  Will try later.   ANH Peterson RN, [facility]  [phone]

## 2020-12-11 NOTE — PROGRESS NOTES
GI Post Procedure Treatment Plan    Interval history:  EGD completed.   - LA Grade B reflux esophagitis.   - Gastric erosions without bleeding. Biopsied.   - Normal examined duodenum.     Plan:  - no GI issues to explain the patient's intractable nausea and vomiting  - anxiety can be contributing to her condition, therefore would recommend addressing that with ativan which may also help with the nausea  - scheduled IV antiemetics  - avoid unnecessary medications  - resume eliquis at prior dose today  - Protonix 40mg PO BID  - we will sign off, please call with questions    Cassie Mercado MD  GI Fellow, PGY - 4

## 2020-12-11 NOTE — ANESTHESIA PREPROCEDURE EVALUATION
12/11/2020    Pre-operative evaluation for Procedure(s) (LRB):  EGD (ESOPHAGOGASTRODUODENOSCOPY) (N/A)    Edita Riley is a 55 y.o. female     Patient Active Problem List   Diagnosis    Osteoarthritis of back    Hiatal hernia    Essential hypertension    Lower extremity pain, diffuse    Weakness of both lower extremities    Impaired functional mobility, balance, gait, and endurance    Schatzki's ring    Colon polyp    Internal hemorrhoids    Cardiovascular event risk -- low    Hemifacial spasm    Severe episode of recurrent major depressive disorder, with psychotic features    Fibromyalgia    Facial palsy    Sleep stage dysfunction    Carpal tunnel syndrome on right    Weakness    Gastroesophageal reflux disease with esophagitis    Lymphadenopathy    History of cervical cancer    Metastatic squamous cell carcinoma to lymph node    Generalized anxiety disorder    PTSD (post-traumatic stress disorder)    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 drug    Diarrhea due to malabsorption    Seizure-like activity    Stroke    Electrolyte disorder    ODESSA (acute kidney injury)    Anemia due to chronic kidney disease    Need for shingles vaccine    Hydronephrosis s/p bilateral nephrostomy tube    Urinary tract infection associated with nephrostomy catheter    Family history of colon cancer    Nephrostomy tube displaced    Generalized abdominal pain    Abnormal mammogram    Radiation cystitis    Bilateral ureteral obstruction    Moderate malnutrition    Peritonitis    Hypokalemia    Wound infection after surgery    Fungemia    Anemia due to chronic blood loss    DVT of lower extremity, bilateral    Abdominal pain    Hypovolemia    FTT (failure to thrive) in adult    Acute deep vein thrombosis (DVT) of lower extremity    Colostomy care       Review of patient's  allergies indicates:   Allergen Reactions    Bee sting [allergen ext-venom-honey bee]      Rash      Grass pollen-bermuda, standard      rash       No current facility-administered medications on file prior to encounter.      Current Outpatient Medications on File Prior to Encounter   Medication Sig Dispense Refill    apixaban (ELIQUIS) 5 mg Tab Take 1 tablet (5 mg total) by mouth 2 (two) times daily. 60 tablet 5    gabapentin (NEURONTIN) 100 MG capsule Take 1 capsule (100 mg total) by mouth every evening. 30 capsule 1    magnesium oxide (MAG-OX) 400 mg (241.3 mg magnesium) tablet Take 1 tablet (400 mg total) by mouth 2 (two) times daily. 60 tablet 1    metoprolol tartrate (LOPRESSOR) 25 MG tablet Take 0.5 tablets (12.5 mg total) by mouth 2 (two) times daily. 30 tablet 1       Past Surgical History:   Procedure Laterality Date    ANTEGRADE NEPHROSTOGRAPHY Bilateral 9/28/2020    Procedure: Nephrostogram - antegrade;  Surgeon: Leslie Balbuena MD;  Location: [facility] OR 1ST FLR;  Service: Urology;  Laterality: Bilateral;    BILATERAL OOPHORECTOMY  2015    CHOLECYSTECTOMY  11/09/2016    Done at [facility], path showed chronic cholecystitis and gallstones    cold knife conization  2014    COLECTOMY, RIGHT  9/17/2020    Procedure: COLECTOMY, RIGHT Extended;  Surgeon: Hammad Reynolds MD;  Location: [facility] OR 2ND FLR;  Service: Colon and Rectal;;    COLONOSCOPY  2014    [facility]    COLONOSCOPY N/A 5/18/2018    Procedure: COLONOSCOPY;  Surgeon: Arden Gutiérrez MD;  Location: [facility] (4TH FLR);  Service: Endoscopy;  Laterality: N/A;    COLONOSCOPY N/A 7/28/2020    Procedure: COLONOSCOPY;  Surgeon: Hammad Reynolds MD;  Location: [facility] ENDO (4TH FLR);  Service: Colon and Rectal;  Laterality: N/A;  covid test [place] 7/25    CYSTOSCOPY WITH URETEROSCOPY, RETROGRADE PYELOGRAPHY, AND INSERTION OF STENT Bilateral 3/21/2020    Procedure: CYSTOSCOPY, WITH RETROGRADE PYELOGRAM,;   Surgeon: Leslie Balbuena MD;  Location: [facility] OR 1ST FLR;  Service: Urology;  Laterality: Bilateral;    ESOPHAGOGASTRODUODENOSCOPY  2014    ESOPHAGOGASTRODUODENOSCOPY  11/18/2020    ESOPHAGOGASTRODUODENOSCOPY N/A 11/18/2020    Procedure: ESOPHAGOGASTRODUODENOSCOPY (EGD);  Surgeon: Zenon Spencer MD;  Location: [facility] ENDO;  Service: Endoscopy;  Laterality: N/A;    HYSTERECTOMY  2014    TV for cervical cancer    ILEOSTOMY  9/17/2020    Procedure: CREATION, ILEOSTOMY;  Surgeon: Hammad Reynolds MD;  Location: [facility] OR 2ND FLR;  Service: Colon and Rectal;;    MOBILIZATION OF SPLENIC FLEXURE N/A 9/11/2020    Procedure: MOBILIZATION, COLONIC;  Surgeon: Hammad Reynolds MD;  Location: [facility] OR 2ND FLR;  Service: Colon and Rectal;  Laterality: N/A;    OOPHORECTOMY      RETROPERITONEAL LYMPHADENECTOMY Right 9/17/2018    Procedure: LYMPHADENECTOMY, RETROPERITONEUM;  Surgeon: Sebas Reed MD;  Location: [facility] OR 2ND FLR;  Service: General;  Laterality: Right;  ILIAC       Social History     Socioeconomic History    Marital status:      Spouse name: Hammad    Number of children: 2    Years of education: Not on file    Highest education level: Not on file   Occupational History    Not on file   Social Needs    Financial resource strain: Not on file    Food insecurity     Worry: Not on file     Inability: Not on file    Transportation needs     Medical: Not on file     Non-medical: Not on file   Tobacco Use    Smoking status: Former Smoker    Smokeless tobacco: Never Used   Substance and Sexual Activity    Alcohol use: No     Alcohol/week: 0.0 standard drinks    Drug use: No    Sexual activity: Yes     Partners: Male     Birth control/protection: None     Comment:  19 years since 1999   Lifestyle    Physical activity     Days per week: Not on file     Minutes per session: Not on file    Stress: Not on file   Relationships    Social connections     Talks on phone: Not on file      Gets together: Not on file     Attends Rastafari service: Not on file     Active member of club or organization: Not on file     Attends meetings of clubs or organizations: Not on file     Relationship status: Not on file   Other Topics Concern    Patient feels they ought to cut down on drinking/drug use Not Asked    Patient annoyed by others criticizing their drinking/drug use Not Asked    Patient has felt bad or guilty about drinking/drug use Not Asked    Patient has had a drink/used drugs as an eye opener in the AM Not Asked   Social History Narrative    , twin daughters (1  2018), disabled due to childhood stroke, [employer]         CBC:   Recent Labs     12/10/20  1007 12/11/20  0438   WBC 5.90 5.32   RBC 3.47* 3.29*   HGB 9.5* 9.1*   HCT 32.2* 31.2*   * 334   MCV 93 95   MCH 27.4 27.7   MCHC 29.5* 29.2*       CMP:   Recent Labs     12/08/20  2033  12/10/20  1007 20  0438      < > 146* 146*   K 3.8   < > 3.6 3.1*   *   < > 118* 118*   CO2 15*   < > 16* 16*   BUN 35*   < > 23* 18   CREATININE 1.6*   < > 1.3 1.1   *   < > 95 83   MG  --    < > 1.7 1.7   PHOS  --    < > 2.5* 3.6   CALCIUM 10.2   < > 9.5 10.0   ALBUMIN 2.9*  --   --   --    PROT 6.9  --   --   --    ALKPHOS 236*  --   --   --    ALT 41  --   --   --    AST 23  --   --   --    BILITOT 0.3  --   --   --     < > = values in this interval not displayed.       INR  Recent Labs     12/10/20  0410 12/10/20  1007   INR 1.0 1.0   APTT 27.6 25.8           Diagnostic Studies:      EKD Echo:  No results found for this or any previous visit.      Anesthesia Evaluation       I have reviewed the Medications.     Review of Systems  Anesthesia Hx:  Denies Family Hx of Anesthesia complications.   Hematology/Oncology:        Hematology Comments: Hx of DVTs.  Current/Recent Cancer. Oncology Comments: Cervical cancer s/p chemoradiation, with FTT     Cardiovascular:   Hypertension     Renal/:   Chronic Renal Disease, renal hypertension, CRI Bilateral hydronephrosis s/p nephrostomy tubes   Hepatic/GI:   Hiatal Hernia, GERD    Musculoskeletal:   Arthritis     Neurological:   CVA Neuromuscular Disease,    Psych:   Psychiatric History anxiety depression          Physical Exam  General:  Malnutrition, Cachexia    Airway/Jaw/Neck:  Airway Findings: Mouth Opening: Normal Tongue: Normal  General Airway Assessment: Adult  Mallampati: II      Dental:  Dental Findings: In tact   Chest/Lungs:  Chest/Lungs Findings: Clear to auscultation, Normal Respiratory Rate     Heart/Vascular:  Heart Findings: Rate: Normal  Rhythm: Regular Rhythm        Mental Status:  Mental Status Findings:  Cooperative, Alert and Oriented         Anesthesia Plan  Type of Anesthesia, risks & benefits discussed:  Anesthesia Type:  general  Patient's Preference:   Intra-op Monitoring Plan: standard ASA monitors  Intra-op Monitoring Plan Comments:   Post Op Pain Control Plan: multimodal analgesia and per primary service following discharge from PACU  Post Op Pain Control Plan Comments:   Induction:   IV  Beta Blocker:  Patient is not currently on a Beta-Blocker (No further documentation required).       Informed Consent: Patient understands risks and agrees with Anesthesia plan.  Questions answered. Anesthesia consent signed with patient.  ASA Score: 3     Day of Surgery Review of History & Physical:    H&P update referred to the surgeon.     Anesthesia Plan Notes: Patient initially scheduled for EGD yesterday, cancelled 2/2 symptoms including SOB and tachypnea.  CTA obtained for concern for PE, which was negative for PE but showed signs of bronchitis.  This AM, patient appears much calmer at bedside, vitals stable.          Ready For Surgery From Anesthesia Perspective.

## 2020-12-11 NOTE — PLAN OF CARE
POC reviewed with pt, AAOx4. No s/s of respiratory or cardiac distress. VS stable on RA. Adequate UOP. LR infusing at 40 mL/hr. NPO status maintained except sips w/ meds. No c/o nausea/pain.     Ileostomy RLQ; bag changed this morning.  Urostomy LLQ; bag changed this morning.  Peristomal skin very raw/tender.    Pt free from falls and injuries, bed in low position, side rails up x2, call light within reach.    Will continue to monitor.

## 2020-12-11 NOTE — PLAN OF CARE
Patient is not medically ready for discharge.   Plan is for EGD today.     12/11/20 1140   Discharge Reassessment   Assessment Type Discharge Planning Reassessment   Discharge Plan A Home Health   Discharge Plan B Other  (TBD)   DME Needed Upon Discharge  other (see comments)  (TBD)   Anticipated Discharge Disposition Home-Health   Can the patient/caregiver answer the patient profile reliably? Yes, cognitively intact   Post-Acute Status   Discharge Delays (!) Procedure Scheduling (IR, OR, Labs, Echo, Cath, Echo, EEG)   Eladia Staples RN, CM   Ext: [phone]

## 2020-12-11 NOTE — PLAN OF CARE
POC reviewed with pt, verbalized understanding. VSS on RA. AAOX4 with flat affect. Remains free of falls and injury.     Old surgical incision noted to midline abdomen. HARPREET.    Ileostomy and urostomy bags noted to lower abdominal quadrants.    Pt went down for EGD. Biopsies taken. Tolerated procedure well per notes.     IVF. Started on clear liquid diet, Pt had 2 episodes of vomiting. Medicated with prn zofran with full relief. No complaints of pain. IS/Acapella bedside. Pt denies chest pain & SOB. Lovenox for VTE.     No acute events or distress noted. Bed in lowest position, call light in reach, frequent rounds made for safety.    WCTM.

## 2020-12-11 NOTE — NURSING
Received the pt to the floor from SICU at around 1830. VSS on room air. Report given to AMY Davis.

## 2020-12-11 NOTE — ASSESSMENT & PLAN NOTE
Chronic, controlled.  Latest blood pressure and vitals reviewed-   Temp:  [97.4 °F (36.3 °C)-98 °F (36.7 °C)]   Pulse:  []   Resp:  [9-33]   BP: ()/(44-63)   SpO2:  [96 %-100 %] .   Home meds for hypertension were reviewed and noted below. Hospital anti-hypertensive changes were made as shown below.  Hypertension Medications             metoprolol tartrate (LOPRESSOR) 25 MG tablet Take 0.5 tablets (12.5 mg total) by mouth 2 (two) times daily.        Will utilize p.r.n. blood pressure medication only if patient's blood pressure greater than  180/110 and she develops symptoms such as worsening chest pain or shortness of breath.

## 2020-12-11 NOTE — PT/OT/SLP DISCHARGE
Occupational Therapy Discharge Summary    Edita [facility]  MRN: [id number]   Principal Problem: Colostomy care      Patient Discharged from acute Occupational Therapy on 12-11-20.  Please refer to prior OT note dated 12-09-20 for functional status.    Assessment:      transfer to ICU    Objective:     GOALS:   Multidisciplinary Problems     Occupational Therapy Goals        Problem: Occupational Therapy Goal    Goal Priority Disciplines Outcome Interventions   Occupational Therapy Goal     OT, PT/OT Ongoing, Progressing    Description: Goals to be met by: 12/23/20     Patient will increase functional independence with ADLs by performing:    Feeding with [place].  UE Dressing with Kinney.  LE Dressing with Minimal Assistance.  Grooming while standing at sink with Stand-by Assistance.  Toileting from toilet with Minimal Assistance for hygiene and clothing management.   Toilet transfer to toilet with Stand-by Assistance.                     Reasons for Discontinuation of Therapy Services  transfer to ICU      Plan:     Patient Discharged to: transfer to ICU need new orders    PEREZ Esquivel  12/11/2020

## 2020-12-11 NOTE — PROGRESS NOTES
"[facility]  Colorectal Surgery  Progress Note    Patient Name: Edita Riley  MRN: [id number]  Admission Date: 12/8/2020  Hospital Length of Stay: 3 days  Attending Physician: Hammad Reynolds MD    Subjective:     Interval History: EGD not completed yesterday. Complaining of nausea and abdominal "uneasiness" Asking for sprite.    Post-Op Info:  Procedure(s) (LRB):  EGD (ESOPHAGOGASTRODUODENOSCOPY) (N/A)   1 Day Post-Op      Medications:  Continuous Infusions:   lactated ringers 40 mL/hr at 12/10/20 1700     Scheduled Meds:   enoxaparin  70 mg Subcutaneous Q12H    gabapentin  100 mg Oral QHS    mupirocin   Nasal BID     PRN Meds:   acetaminophen    naloxone    ondansetron    promethazine (PHENERGAN) IVPB    sodium chloride 0.9%        Objective:     Vital Signs (Most Recent):  Temp: 98 °F (36.7 °C) (12/11/20 0748)  Pulse: 107 (12/11/20 0748)  Resp: 16 (12/11/20 0748)  BP: (!) 126/57 (12/11/20 0748)  SpO2: 98 % (12/11/20 0748) Vital Signs (24h Range):  Temp:  [97.4 °F (36.3 °C)-98 °F (36.7 °C)] 98 °F (36.7 °C)  Pulse:  [] 107  Resp:  [9-33] 16  SpO2:  [96 %-100 %] 98 %  BP: ()/(44-63) 126/57     Intake/Output - Last 3 Shifts       12/09 0700 - 12/10 0659 12/10 0700 - 12/11 0659 12/11 0700 - 12/12 0659    P.O. 0 440     I.V. (mL/kg) 1105 (15.6) 280.6 (4)     Other 0      IV Piggyback 100      Total Intake(mL/kg) 1205 (17) 720.6 (10.2)     Urine (mL/kg/hr) 800 (0.5) 1000 (0.6)     Emesis/NG output 100      Stool 100 275     Total Output 1000 1275     Net +205 -554.4            Stool Occurrence 0 x      Emesis Occurrence 1 x            Physical Exam  Vitals signs and nursing note reviewed.   Constitutional:       Appearance: She is well-developed. She is not ill-appearing.   HENT:      Head: Normocephalic.   Eyes:      Pupils: Pupils are equal, round, and reactive to light.   Cardiovascular:      Rate and Rhythm: Normal rate and regular rhythm.      Heart sounds: Normal " heart sounds.   Pulmonary:      Effort: Pulmonary effort is normal. No respiratory distress.      Breath sounds: Normal breath sounds. No wheezing or rales.   Abdominal:      Palpations: Abdomen is soft. There is no mass.      Tenderness: There is abdominal tenderness. There is no guarding or rebound.      Comments: Stoma and urostomy functional  Inc line healing    Genitourinary:     Comments: aleah neprh draining   Skin:     General: Skin is warm and dry.   Neurological:      Mental Status: She is alert and oriented to person, place, and time.   Psychiatric:         Behavior: Behavior normal.         Thought Content: Thought content normal.         Judgment: Judgment normal.             Significant Labs:  BMP (Last 3 Results):   Recent Labs   Lab 12/09/20  0407 12/10/20  0410 12/10/20  1007 12/11/20  0438    95  95 95 83    146*  146* 146* 146*   K 3.5 3.3*  3.3* 3.6 3.1*   * 117*  117* 118* 118*   CO2 18* 17*  17* 16* 16*   BUN 32* 24*  24* 23* 18   CREATININE 1.5* 1.4  1.4 1.3 1.1   CALCIUM 10.4 9.9  9.9 9.5 10.0   MG 1.9  --  1.7 1.7     CBC (Last 3 Results):   Recent Labs   Lab 12/10/20  0410 12/10/20  1007 12/11/20  0438   WBC 6.17  6.17 5.90 5.32   RBC 3.52*  3.52* 3.47* 3.29*   HGB 9.6*  9.6* 9.5* 9.1*   HCT 32.7*  32.7* 32.2* 31.2*     346 351* 334   MCV 93  93 93 95   MCH 27.3  27.3 27.4 27.7   MCHC 29.4*  29.4* 29.5* 29.2*       Significant Diagnostics:  CT 12/8  No acute abnormality identified in the abdomen or pelvis.     Bilateral femoral vein and left iliac vein filling defects suggestive of DVTs, though mildly improved when compared with 11/04/2020.     Stable bilateral hydroureteronephrosis.  Postoperative changes of left lower quadrant transverse colon urinary conduit and right lower quadrant ileostomy.    Assessment/Plan:     Acute deep vein thrombosis (DVT) of lower extremity  Therapeutic lovenox  Naseem hose  Enc ambulation    FTT (failure to thrive) in  adult  [facility] 55 y.[facility] female with history of cervical cancer s/p radiation with resulting BL ureteral strictures s/p BL nephrostomy tube placement. On 9/11/20 she underwent creation of a transverse colon urinary conduit (Dr. Reynolds, Dr. Balbuena). Her post-operative course was complicated by a bowel perforation s/p extended right hemicolectomy and ileostomy creation (9/17/20). Subsequently she required IR drainage of a pelvic collection (9/23/20) and longterm IV antibiotics/antifungals. She was discharged from the hospital to an LTAC on 10/13/20, was in LTC for almost 60 days, home for 4 days, family having difficulty caring for pt, not eating at home and difficulty keeping appliance over ileostomy    Consult wound care  PT/OT  Calorie count      Hypovolemia  Monitor I&O  Monitor vs  Continue IVF    Bilateral ureteral obstruction  aleah nephrostomy tubes functional    Gastroesophageal reflux disease with esophagitis  C/o of feeling food getting stuck in chest  Consult GI  NPO for now, likely EGD today    Impaired functional mobility, balance, gait, and endurance  PT/OT    Essential hypertension  Chronic, controlled.  Latest blood pressure and vitals reviewed-   Temp:  [97.4 °F (36.3 °C)-98 °F (36.7 °C)]   Pulse:  []   Resp:  [9-33]   BP: ()/(44-63)   SpO2:  [96 %-100 %] .   Home meds for hypertension were reviewed and noted below. Hospital anti-hypertensive changes were made as shown below.  Hypertension Medications             metoprolol tartrate (LOPRESSOR) 25 MG tablet Take 0.5 tablets (12.5 mg total) by mouth 2 (two) times daily.        Will utilize p.r.n. blood pressure medication only if patient's blood pressure greater than  180/110 and she develops symptoms such as worsening chest pain or shortness of breath.          Andria Hua, NP  Colorectal Surgery  [facility]

## 2020-12-11 NOTE — PT/OT/SLP DISCHARGE
Physical Therapy Discharge Summary    Name: Edita Riley  MRN: [id number]   Principal Problem: Colostomy care     Patient Discharged from acute Physical Therapy on 2020.  Please refer to prior PT noted date on 2020 for functional status.     Assessment:     Pt transferred to/from ICU 12/10/2020 - new orders needed once medically appropriate    Objective:     GOALS:   Multidisciplinary Problems     Physical Therapy Goals        Problem: Physical Therapy Goal    Goal Priority Disciplines Outcome Goal Variances Interventions   Physical Therapy Goal     PT, PT/OT Ongoing, Progressing     Description: Goals to be met by: 2021     Patient will increase functional independence with mobility by performin. Sit to stand transfer with Supervision  2. Bed to chair transfer with Supervision using LRAD.  3. Gait  x 100ft feet with Supervision using LRAD.   4. Ascend/descend 3 stair with left Handrails Stand-by Assistance using LRAD.                          Reasons for Discontinuation of Therapy Services  Pt transferred to/from ICU 12/10/2020 - new orders needed once medically appropriate      Plan:     [facility]  [facility]

## 2020-12-12 LAB
ANION GAP SERPL CALC-SCNC: 9 MMOL/L (ref 8–16)
BASOPHILS # BLD AUTO: 0.04 K/UL (ref 0–0.2)
BASOPHILS NFR BLD: 0.8 % (ref 0–1.9)
BUN SERPL-MCNC: 13 MG/DL (ref 6–20)
CALCIUM SERPL-MCNC: 9.4 MG/DL (ref 8.7–10.5)
CHLORIDE SERPL-SCNC: 120 MMOL/L (ref 95–110)
CO2 SERPL-SCNC: 18 MMOL/L (ref 23–29)
CREAT SERPL-MCNC: 1 MG/DL (ref 0.5–1.4)
CRP SERPL-MCNC: 2.2 MG/L (ref 0–8.2)
DIFFERENTIAL METHOD: ABNORMAL
EOSINOPHIL # BLD AUTO: 0.2 K/UL (ref 0–0.5)
EOSINOPHIL NFR BLD: 3.4 % (ref 0–8)
ERYTHROCYTE [DISTWIDTH] IN BLOOD BY AUTOMATED COUNT: 20.7 % (ref 11.5–14.5)
EST. GFR  (AFRICAN AMERICAN): >60 ML/MIN/1.73 M^2
EST. GFR  (NON AFRICAN AMERICAN): >60 ML/MIN/1.73 M^2
GLUCOSE SERPL-MCNC: 83 MG/DL (ref 70–110)
HCT VFR BLD AUTO: 33.4 % (ref 37–48.5)
HGB BLD-MCNC: 9.5 G/DL (ref 12–16)
IMM GRANULOCYTES # BLD AUTO: 0.02 K/UL (ref 0–0.04)
IMM GRANULOCYTES NFR BLD AUTO: 0.4 % (ref 0–0.5)
LYMPHOCYTES # BLD AUTO: 1.5 K/UL (ref 1–4.8)
LYMPHOCYTES NFR BLD: 32.2 % (ref 18–48)
MAGNESIUM SERPL-MCNC: 2.1 MG/DL (ref 1.6–2.6)
MAGNESIUM SERPL-MCNC: 2.1 MG/DL (ref 1.6–2.6)
MCH RBC QN AUTO: 27.1 PG (ref 27–31)
MCHC RBC AUTO-ENTMCNC: 28.4 G/DL (ref 32–36)
MCV RBC AUTO: 95 FL (ref 82–98)
MONOCYTES # BLD AUTO: 0.9 K/UL (ref 0.3–1)
MONOCYTES NFR BLD: 18.6 % (ref 4–15)
NEUTROPHILS # BLD AUTO: 2.1 K/UL (ref 1.8–7.7)
NEUTROPHILS NFR BLD: 44.6 % (ref 38–73)
NRBC BLD-RTO: 0 /100 WBC
PHOSPHATE SERPL-MCNC: 2.8 MG/DL (ref 2.7–4.5)
PHOSPHATE SERPL-MCNC: 2.8 MG/DL (ref 2.7–4.5)
PLATELET # BLD AUTO: 316 K/UL (ref 150–350)
PMV BLD AUTO: 11 FL (ref 9.2–12.9)
POTASSIUM SERPL-SCNC: 4.1 MMOL/L (ref 3.5–5.1)
RBC # BLD AUTO: 3.51 M/UL (ref 4–5.4)
SODIUM SERPL-SCNC: 147 MMOL/L (ref 136–145)
WBC # BLD AUTO: 4.72 K/UL (ref 3.9–12.7)

## 2020-12-12 PROCEDURE — 85025 COMPLETE CBC W/AUTO DIFF WBC: CPT

## 2020-12-12 PROCEDURE — 94761 N-INVAS EAR/PLS OXIMETRY MLT: CPT

## 2020-12-12 PROCEDURE — 25000003 PHARM REV CODE 250: Performed by: STUDENT IN AN ORGANIZED HEALTH CARE EDUCATION/TRAINING PROGRAM

## 2020-12-12 PROCEDURE — 84100 ASSAY OF PHOSPHORUS: CPT

## 2020-12-12 PROCEDURE — 36415 COLL VENOUS BLD VENIPUNCTURE: CPT

## 2020-12-12 PROCEDURE — 80048 BASIC METABOLIC PNL TOTAL CA: CPT

## 2020-12-12 PROCEDURE — 83735 ASSAY OF MAGNESIUM: CPT

## 2020-12-12 PROCEDURE — 86140 C-REACTIVE PROTEIN: CPT

## 2020-12-12 PROCEDURE — 25000003 PHARM REV CODE 250: Performed by: NURSE PRACTITIONER

## 2020-12-12 PROCEDURE — 20600001 HC STEP DOWN PRIVATE ROOM

## 2020-12-12 RX ORDER — METOPROLOL TARTRATE 25 MG/1
12.5 TABLET ORAL 2 TIMES DAILY
Status: DISCONTINUED | OUTPATIENT
Start: 2020-12-12 | End: 2020-12-16 | Stop reason: HOSPADM

## 2020-12-12 RX ADMIN — APIXABAN 5 MG: 5 TABLET, FILM COATED ORAL at 09:12

## 2020-12-12 RX ADMIN — METOPROLOL TARTRATE 12.5 MG: 25 TABLET, FILM COATED ORAL at 09:12

## 2020-12-12 RX ADMIN — MUPIROCIN: 20 OINTMENT TOPICAL at 09:12

## 2020-12-12 RX ADMIN — PANTOPRAZOLE SODIUM 40 MG: 40 TABLET, DELAYED RELEASE ORAL at 09:12

## 2020-12-12 RX ADMIN — LORAZEPAM 0.5 MG: 0.5 TABLET ORAL at 10:12

## 2020-12-12 RX ADMIN — GABAPENTIN 100 MG: 100 CAPSULE ORAL at 09:12

## 2020-12-12 NOTE — PLAN OF CARE
POC reviewed with patient who verbalized understanding. VSS on RA. AAOX4. Remains free of falls and injury. Frequent weight shift encouraged. Pt refused multiple attempts to get up to chair or ambulate.     - RLQ ileostomy  - LLQ urostomy, adequate UO    Tolerating diet, denies nausea and pain. Telemetry being monitored running NSR. Educated on IS use. Patient denies chest pain & SOB. SCDS in place. No acute events. No distress noted. Bed in lowest position, call light within reach, frequent rounds made for safety.     WCTM

## 2020-12-12 NOTE — SUBJECTIVE & OBJECTIVE
Subjective:     Interval History:     EGD completed yesterday showing gastritis and esophagitis, protonix changed to BID per GI. Pain well controlled this AM. Denies any n/v.     Post-Op Info:  Procedure(s) (LRB):  EGD (ESOPHAGOGASTRODUODENOSCOPY) (N/A)   1 Day Post-Op      Medications:  Continuous Infusions:    Scheduled Meds:   apixaban  5 mg Oral BID    gabapentin  100 mg Oral QHS    metoprolol tartrate  12.5 mg Oral BID    mupirocin   Nasal BID    pantoprazole  40 mg Oral BID     PRN Meds:   acetaminophen    LORazepam    naloxone    ondansetron    promethazine (PHENERGAN) IVPB    sodium chloride 0.9%        Objective:     Vital Signs (Most Recent):  Temp: 97.7 °F (36.5 °C) (12/12/20 0800)  Pulse: 75 (12/12/20 0800)  Resp: 16 (12/12/20 0800)  BP: (!) 116/58 (12/12/20 0800)  SpO2: 100 % (12/12/20 0800) Vital Signs (24h Range):  Temp:  [97.2 °F (36.2 °C)-98.1 °F (36.7 °C)] 97.7 °F (36.5 °C)  Pulse:  [] 75  Resp:  [12-19] 16  SpO2:  [96 %-100 %] 100 %  BP: (104-125)/(55-75) 116/58     Intake/Output - Last 3 Shifts       12/10 0700 - 12/11 0659 12/11 0700 - 12/12 0659 12/12 0700 - 12/13 0659    P.O. 440 240     I.V. (mL/kg) 280.6 (4)      Other  125     IV Piggyback       Total Intake(mL/kg) 720.6 (10.2) 365 (5.2)     Urine (mL/kg/hr) 1000 (0.6) 575 (0.3)     Emesis/NG output  300     Stool 275 175     Total Output 1275 1050     Net -554.4 -685            Stool Occurrence  0 x           Physical Exam  Vitals signs and nursing note reviewed.   Constitutional:       Appearance: She is well-developed. She is not ill-appearing.   HENT:      Head: Normocephalic.   Eyes:      Pupils: Pupils are equal, round, and reactive to light.   Cardiovascular:      Rate and Rhythm: Normal rate and regular rhythm.      Heart sounds: Normal heart sounds.   Pulmonary:      Effort: Pulmonary effort is normal. No respiratory distress.      Breath sounds: Normal breath sounds. No wheezing or rales.   Abdominal:       Palpations: Abdomen is soft. There is no mass.      Tenderness: There is no guarding or rebound.      Comments: Stoma and urostomy functional  Excoriation over abdomen improved. No leak from either pouch   Genitourinary:     Comments: aleah neprh draining   Skin:     General: Skin is warm and dry.   Neurological:      Mental Status: She is alert and oriented to person, place, and time.   Psychiatric:         Behavior: Behavior normal.         Thought Content: Thought content normal.         Judgment: Judgment normal.             Significant Labs:  BMP (Last 3 Results):   Recent Labs   Lab 12/10/20  1007 12/11/20 0438 12/12/20 0431   GLU 95 83 83   * 146* 147*   K 3.6 3.1* 4.1   * 118* 120*   CO2 16* 16* 18*   BUN 23* 18 13   CREATININE 1.3 1.1 1.0   CALCIUM 9.5 10.0 9.4   MG 1.7 1.7 2.1  2.1     CBC (Last 3 Results):   Recent Labs   Lab 12/10/20  1007 12/11/20  0438 12/12/20 0431   WBC 5.90 5.32 4.72   RBC 3.47* 3.29* 3.51*   HGB 9.5* 9.1* 9.5*   HCT 32.2* 31.2* 33.4*   * 334 316   MCV 93 95 95   MCH 27.4 27.7 27.1   MCHC 29.5* 29.2* 28.4*       Significant Diagnostics:  CT 12/8  No acute abnormality identified in the abdomen or pelvis.     Bilateral femoral vein and left iliac vein filling defects suggestive of DVTs, though mildly improved when compared with 11/04/2020.     Stable bilateral hydroureteronephrosis.  Postoperative changes of left lower quadrant transverse colon urinary conduit and right lower quadrant ileostomy.

## 2020-12-12 NOTE — PROGRESS NOTES
[facility]  Colorectal Surgery  Progress Note    Patient Name: Edita Riley  MRN: [id number]  Admission Date: 12/8/2020  Hospital Length of Stay: 4 days  Attending Physician: Hammad Reynolds MD    Subjective:     Interval History:     EGD completed yesterday showing gastritis and esophagitis, protonix changed to BID per GI. Pain well controlled this AM. Denies any n/v.     Post-Op Info:  Procedure(s) (LRB):  EGD (ESOPHAGOGASTRODUODENOSCOPY) (N/A)   1 Day Post-Op      Medications:  Continuous Infusions:    Scheduled Meds:   apixaban  5 mg Oral BID    gabapentin  100 mg Oral QHS    metoprolol tartrate  12.5 mg Oral BID    mupirocin   Nasal BID    pantoprazole  40 mg Oral BID     PRN Meds:   acetaminophen    LORazepam    naloxone    ondansetron    promethazine (PHENERGAN) IVPB    sodium chloride 0.9%        Objective:     Vital Signs (Most Recent):  Temp: 97.7 °F (36.5 °C) (12/12/20 0800)  Pulse: 75 (12/12/20 0800)  Resp: 16 (12/12/20 0800)  BP: (!) 116/58 (12/12/20 0800)  SpO2: 100 % (12/12/20 0800) Vital Signs (24h Range):  Temp:  [97.2 °F (36.2 °C)-98.1 °F (36.7 °C)] 97.7 °F (36.5 °C)  Pulse:  [] 75  Resp:  [12-19] 16  SpO2:  [96 %-100 %] 100 %  BP: (104-125)/(55-75) 116/58     Intake/Output - Last 3 Shifts       12/10 0700 - 12/11 0659 12/11 0700 - 12/12 0659 12/12 0700 - 12/13 0659    P.O. 440 240     I.V. (mL/kg) 280.6 (4)      Other  125     IV Piggyback       Total Intake(mL/kg) 720.6 (10.2) 365 (5.2)     Urine (mL/kg/hr) 1000 (0.6) 575 (0.3)     Emesis/NG output  300     Stool 275 175     Total Output 1275 1050     Net -554.4 -685            Stool Occurrence  0 x           Physical Exam  Vitals signs and nursing note reviewed.   Constitutional:       Appearance: She is well-developed. She is not ill-appearing.   HENT:      Head: Normocephalic.   Eyes:      Pupils: Pupils are equal, round, and reactive to light.   Cardiovascular:      Rate and Rhythm: Normal rate  and regular rhythm.      Heart sounds: Normal heart sounds.   Pulmonary:      Effort: Pulmonary effort is normal. No respiratory distress.      Breath sounds: Normal breath sounds. No wheezing or rales.   Abdominal:      Palpations: Abdomen is soft. There is no mass.      Tenderness: There is no guarding or rebound.      Comments: Stoma and urostomy functional  Excoriation over abdomen improved. No leak from either pouch   Genitourinary:     Comments: aleah neprh draining   Skin:     General: Skin is warm and dry.   Neurological:      Mental Status: She is alert and oriented to person, place, and time.   Psychiatric:         Behavior: Behavior normal.         Thought Content: Thought content normal.         Judgment: Judgment normal.             Significant Labs:  BMP (Last 3 Results):   Recent Labs   Lab 12/10/20  1007 12/11/20  0438 12/12/20  0431   GLU 95 83 83   * 146* 147*   K 3.6 3.1* 4.1   * 118* 120*   CO2 16* 16* 18*   BUN 23* 18 13   CREATININE 1.3 1.1 1.0   CALCIUM 9.5 10.0 9.4   MG 1.7 1.7 2.1  2.1     CBC (Last 3 Results):   Recent Labs   Lab 12/10/20  1007 12/11/20  0438 12/12/20  0431   WBC 5.90 5.32 4.72   RBC 3.47* 3.29* 3.51*   HGB 9.5* 9.1* 9.5*   HCT 32.2* 31.2* 33.4*   * 334 316   MCV 93 95 95   MCH 27.4 27.7 27.1   MCHC 29.5* 29.2* 28.4*       Significant Diagnostics:  CT 12/8  No acute abnormality identified in the abdomen or pelvis.     Bilateral femoral vein and left iliac vein filling defects suggestive of DVTs, though mildly improved when compared with 11/04/2020.     Stable bilateral hydroureteronephrosis.  Postoperative changes of left lower quadrant transverse colon urinary conduit and right lower quadrant ileostomy.    Assessment/Plan:     Acute deep vein thrombosis (DVT) of lower extremity  Restart Eliquis today    FTT (failure to thrive) in adult  [facility] 55 [facility] with history of cervical cancer s/p radiation with resulting BL ureteral  strictures s/p BL nephrostomy tube placement. On 9/11/20 she underwent creation of a transverse colon urinary conduit (Dr. Reynolds, Dr. Balbuena). Her post-operative course was complicated by a bowel perforation s/p extended right hemicolectomy and ileostomy creation (9/17/20). Subsequently she required IR drainage of a pelvic collection (9/23/20) and longterm IV antibiotics/antifungals. She was discharged from the hospital to an LTAC on 10/13/20, was in LTC for almost 60 days, home for 4 days, family having difficulty caring for pt, not eating at home and difficulty keeping appliance over ileostomy    Consult wound care  PT/OT  Calorie count      Hypovolemia  D/c IVFs today    Bilateral ureteral obstruction  aleah nephrostomy tubes functional  Appreciate urology recs    Gastroesophageal reflux disease with esophagitis  EGD with gastritis and esophagitis  Protonix changed to BID  No n./v today  Advance to LRD    Impaired functional mobility, balance, gait, and endurance  PT/OT    Essential hypertension  Chronic, controlled.  Latest blood pressure and vitals reviewed-   Temp:  [97.4 °F (36.3 °C)-98 °F (36.7 °C)]   Pulse:  []   Resp:  [9-33]   BP: ()/(44-63)   SpO2:  [96 %-100 %] .   Home meds for hypertension were reviewed and noted below. Hospital anti-hypertensive changes were made as shown below.  Hypertension Medications             metoprolol tartrate (LOPRESSOR) 25 MG tablet Take 0.5 tablets (12.5 mg total) by mouth 2 (two) times daily.        Will utilize p.r.n. blood pressure medication only if patient's blood pressure greater than  180/110 and she develops symptoms such as worsening chest pain or shortness of breath.          Moe Mclean MD  Colorectal Surgery  [facility]

## 2020-12-12 NOTE — PLAN OF CARE
POC reviewed with pt, AAOx4. No s/s of respiratory or cardiac distress. VS stable on RA. Adequate UOP. LR infusing at 40 mL/hr. Tolerating clear liquid diet well. No c/o nausea/pain.      Ileostomy RLQ; bag changed beginning of shift.  Urostomy LLQ; bag changed beginning of shift.  Peristomal skin very raw/tender.     Pt free from falls and injuries, bed in low position, side rails up x2, call light within reach.     Will continue to monitor

## 2020-12-13 LAB
ANION GAP SERPL CALC-SCNC: 6 MMOL/L (ref 8–16)
ANISOCYTOSIS BLD QL SMEAR: SLIGHT
BACTERIA BLD CULT: NORMAL
BACTERIA BLD CULT: NORMAL
BASOPHILS # BLD AUTO: 0.02 K/UL (ref 0–0.2)
BASOPHILS NFR BLD: 0.4 % (ref 0–1.9)
BUN SERPL-MCNC: 9 MG/DL (ref 6–20)
CALCIUM SERPL-MCNC: 9.5 MG/DL (ref 8.7–10.5)
CHLORIDE SERPL-SCNC: 121 MMOL/L (ref 95–110)
CO2 SERPL-SCNC: 18 MMOL/L (ref 23–29)
CREAT SERPL-MCNC: 1 MG/DL (ref 0.5–1.4)
CRP SERPL-MCNC: 1.1 MG/L (ref 0–8.2)
DIFFERENTIAL METHOD: ABNORMAL
EOSINOPHIL # BLD AUTO: 0.1 K/UL (ref 0–0.5)
EOSINOPHIL NFR BLD: 2.1 % (ref 0–8)
ERYTHROCYTE [DISTWIDTH] IN BLOOD BY AUTOMATED COUNT: 20.7 % (ref 11.5–14.5)
EST. GFR  (AFRICAN AMERICAN): >60 ML/MIN/1.73 M^2
EST. GFR  (NON AFRICAN AMERICAN): >60 ML/MIN/1.73 M^2
GLUCOSE SERPL-MCNC: 80 MG/DL (ref 70–110)
HCT VFR BLD AUTO: 33.7 % (ref 37–48.5)
HGB BLD-MCNC: 9.4 G/DL (ref 12–16)
HYPOCHROMIA BLD QL SMEAR: ABNORMAL
IMM GRANULOCYTES # BLD AUTO: 0.01 K/UL (ref 0–0.04)
IMM GRANULOCYTES NFR BLD AUTO: 0.2 % (ref 0–0.5)
LYMPHOCYTES # BLD AUTO: 1.5 K/UL (ref 1–4.8)
LYMPHOCYTES NFR BLD: 31.1 % (ref 18–48)
MAGNESIUM SERPL-MCNC: 1.6 MG/DL (ref 1.6–2.6)
MCH RBC QN AUTO: 27 PG (ref 27–31)
MCHC RBC AUTO-ENTMCNC: 27.9 G/DL (ref 32–36)
MCV RBC AUTO: 97 FL (ref 82–98)
MONOCYTES # BLD AUTO: 0.7 K/UL (ref 0.3–1)
MONOCYTES NFR BLD: 15.3 % (ref 4–15)
NEUTROPHILS # BLD AUTO: 2.5 K/UL (ref 1.8–7.7)
NEUTROPHILS NFR BLD: 50.9 % (ref 38–73)
NRBC BLD-RTO: 0 /100 WBC
OVALOCYTES BLD QL SMEAR: ABNORMAL
PHOSPHATE SERPL-MCNC: 2.3 MG/DL (ref 2.7–4.5)
PLATELET # BLD AUTO: 272 K/UL (ref 150–350)
PMV BLD AUTO: 11.2 FL (ref 9.2–12.9)
POIKILOCYTOSIS BLD QL SMEAR: SLIGHT
POLYCHROMASIA BLD QL SMEAR: ABNORMAL
POTASSIUM SERPL-SCNC: 4 MMOL/L (ref 3.5–5.1)
RBC # BLD AUTO: 3.48 M/UL (ref 4–5.4)
SODIUM SERPL-SCNC: 145 MMOL/L (ref 136–145)
WBC # BLD AUTO: 4.83 K/UL (ref 3.9–12.7)

## 2020-12-13 PROCEDURE — 80048 BASIC METABOLIC PNL TOTAL CA: CPT

## 2020-12-13 PROCEDURE — 36415 COLL VENOUS BLD VENIPUNCTURE: CPT

## 2020-12-13 PROCEDURE — 20600001 HC STEP DOWN PRIVATE ROOM

## 2020-12-13 PROCEDURE — 85025 COMPLETE CBC W/AUTO DIFF WBC: CPT

## 2020-12-13 PROCEDURE — 83735 ASSAY OF MAGNESIUM: CPT

## 2020-12-13 PROCEDURE — 25000003 PHARM REV CODE 250: Performed by: NURSE PRACTITIONER

## 2020-12-13 PROCEDURE — 94761 N-INVAS EAR/PLS OXIMETRY MLT: CPT

## 2020-12-13 PROCEDURE — 25000003 PHARM REV CODE 250: Performed by: STUDENT IN AN ORGANIZED HEALTH CARE EDUCATION/TRAINING PROGRAM

## 2020-12-13 PROCEDURE — 86140 C-REACTIVE PROTEIN: CPT

## 2020-12-13 PROCEDURE — 84100 ASSAY OF PHOSPHORUS: CPT

## 2020-12-13 RX ADMIN — GABAPENTIN 100 MG: 100 CAPSULE ORAL at 09:12

## 2020-12-13 RX ADMIN — PANTOPRAZOLE SODIUM 40 MG: 40 TABLET, DELAYED RELEASE ORAL at 09:12

## 2020-12-13 RX ADMIN — METOPROLOL TARTRATE 12.5 MG: 25 TABLET, FILM COATED ORAL at 09:12

## 2020-12-13 RX ADMIN — MUPIROCIN: 20 OINTMENT TOPICAL at 09:12

## 2020-12-13 RX ADMIN — APIXABAN 5 MG: 5 TABLET, FILM COATED ORAL at 09:12

## 2020-12-13 RX ADMIN — LORAZEPAM 0.5 MG: 0.5 TABLET ORAL at 09:12

## 2020-12-13 NOTE — PROGRESS NOTES
[facility]  Colorectal Surgery  Progress Note    Patient Name: Edita Riley  MRN: [id number]  Admission Date: 12/8/2020  Hospital Length of Stay: 5 days  Attending Physician: Hammad Reynolds MD    Subjective:     Interval History:     No events. Took in 2000 cc of PO yesterdayI. Pain well controlled this AM. Denies any n/v. Having ileostomy output.    Post-Op Info:  Procedure(s) (LRB):  EGD (ESOPHAGOGASTRODUODENOSCOPY) (N/A)   2 Days Post-Op      Medications:  Continuous Infusions:    Scheduled Meds:   apixaban  5 mg Oral BID    gabapentin  100 mg Oral QHS    metoprolol tartrate  12.5 mg Oral BID    mupirocin   Nasal BID    pantoprazole  40 mg Oral BID     PRN Meds:   acetaminophen    LORazepam    naloxone    ondansetron    promethazine (PHENERGAN) IVPB    sodium chloride 0.9%        Objective:     Vital Signs (Most Recent):  Temp: 97.8 °F (36.6 °C) (12/13/20 0758)  Pulse: 73 (12/13/20 0929)  Resp: 17 (12/13/20 0929)  BP: 117/61 (12/13/20 0758)  SpO2: 97 % (12/13/20 0929) Vital Signs (24h Range):  Temp:  [96.6 °F (35.9 °C)-97.8 °F (36.6 °C)] 97.8 °F (36.6 °C)  Pulse:  [71-84] 73  Resp:  [16-20] 17  SpO2:  [96 %-100 %] 97 %  BP: ()/(54-69) 117/61     Intake/Output - Last 3 Shifts       12/11 0700 - 12/12 0659 12/12 0700 - 12/13 0659 12/13 0700 - 12/14 0659    P.O. 240 2040     I.V. (mL/kg)  0 (0)     Other 125 0     Total Intake(mL/kg) 365 (5.2) 2040 (28.8)     Urine (mL/kg/hr) 575 (0.3) 1400 (0.8)     Emesis/NG output 300      Stool 175 1225     Total Output 1050 2625     Net -685 -585            Stool Occurrence 0 x 0 x           Physical Exam  Vitals signs and nursing note reviewed.   Constitutional:       Appearance: She is well-developed. She is not ill-appearing.   HENT:      Head: Normocephalic.   Eyes:      Pupils: Pupils are equal, round, and reactive to light.   Cardiovascular:      Rate and Rhythm: Normal rate and regular rhythm.      Heart sounds: Normal heart  sounds.   Pulmonary:      Effort: Pulmonary effort is normal. No respiratory distress.      Breath sounds: Normal breath sounds. No wheezing or rales.   Abdominal:      Palpations: Abdomen is soft. There is no mass.      Tenderness: There is no guarding or rebound.      Comments: Stoma and urostomy functional  Excoriation over abdomen improved. No leak from either pouch   Genitourinary:     Comments: aleah neprh draining   Skin:     General: Skin is warm and dry.   Neurological:      Mental Status: She is alert and oriented to person, place, and time.   Psychiatric:         Behavior: Behavior normal.         Thought Content: Thought content normal.         Judgment: Judgment normal.             Significant Labs:  BMP (Last 3 Results):   Recent Labs   Lab 12/11/20 0438 12/12/20 0431 12/13/20  0309   GLU 83 83 80   * 147* 145   K 3.1* 4.1 4.0   * 120* 121*   CO2 16* 18* 18*   BUN 18 13 9   CREATININE 1.1 1.0 1.0   CALCIUM 10.0 9.4 9.5   MG 1.7 2.1  2.1 1.6     CBC (Last 3 Results):   Recent Labs   Lab 12/11/20 0438 12/12/20 0431 12/13/20  0309   WBC 5.32 4.72 4.83   RBC 3.29* 3.51* 3.48*   HGB 9.1* 9.5* 9.4*   HCT 31.2* 33.4* 33.7*    316 272   MCV 95 95 97   MCH 27.7 27.1 27.0   MCHC 29.2* 28.4* 27.9*       Significant Diagnostics:  CT 12/8  No acute abnormality identified in the abdomen or pelvis.     Bilateral femoral vein and left iliac vein filling defects suggestive of DVTs, though mildly improved when compared with 11/04/2020.     Stable bilateral hydroureteronephrosis.  Postoperative changes of left lower quadrant transverse colon urinary conduit and right lower quadrant ileostomy.    Assessment/Plan:     Acute deep vein thrombosis (DVT) of lower extremity  Restarted Eliquis    FTT (failure to thrive) in adult  Edita [facility] is a 55 y.o. female with history of cervical cancer s/p radiation with resulting BL ureteral strictures s/p BL nephrostomy tube placement. On 9/11/20 she  underwent creation of a transverse colon urinary conduit (Dr. Reynolds, Dr. Balbuena). Her post-operative course was complicated by a bowel perforation s/p extended right hemicolectomy and ileostomy creation (9/17/20). Subsequently she required IR drainage of a pelvic collection (9/23/20) and longterm IV antibiotics/antifungals. She was discharged from the hospital to an LTAC on 10/13/20, was in LTC for almost 60 days, home for 4 days, family having difficulty caring for pt, not eating at home and difficulty keeping appliance over ileostomy    Consult wound care  PT/OT  Calorie count      Hypovolemia  D/c IVFs today    Bilateral ureteral obstruction  aleah nephrostomy tubes functional  Appreciate urology recs    Gastroesophageal reflux disease with esophagitis  EGD with gastritis and esophagitis  Protonix changed to BID  No n./v today  Advance to LRD    Impaired functional mobility, balance, gait, and endurance  PT/OT    Essential hypertension  Chronic, controlled.  Latest blood pressure and vitals reviewed-   Temp:  [97.4 °F (36.3 °C)-98 °F (36.7 °C)]   Pulse:  []   Resp:  [9-33]   BP: ()/(44-63)   SpO2:  [96 %-100 %] .   Home meds for hypertension were reviewed and noted below. Hospital anti-hypertensive changes were made as shown below.  Hypertension Medications             metoprolol tartrate (LOPRESSOR) 25 MG tablet Take 0.5 tablets (12.5 mg total) by mouth 2 (two) times daily.        Will utilize p.r.n. blood pressure medication only if patient's blood pressure greater than  180/110 and she develops symptoms such as worsening chest pain or shortness of breath.          Moe Mclean MD  Colorectal Surgery  [facility]

## 2020-12-13 NOTE — PLAN OF CARE
Plan of care reviewed with pt/verbalized understanding, vss, patient on low fiber/residue tolerating well with no c/o of nausea/vomiting, patient denies pain, patient illeostomy bag changed x1 this shift, urostomy intact draining yellow urine, call-light within reach, will continue to monitor.

## 2020-12-13 NOTE — SUBJECTIVE & OBJECTIVE
Subjective:     Interval History:     No events. Took in 2000 cc of PO yesterdayI. Pain well controlled this AM. Denies any n/v. Having ileostomy output.    Post-Op Info:  Procedure(s) (LRB):  EGD (ESOPHAGOGASTRODUODENOSCOPY) (N/A)   2 Days Post-Op      Medications:  Continuous Infusions:    Scheduled Meds:   apixaban  5 mg Oral BID    gabapentin  100 mg Oral QHS    metoprolol tartrate  12.5 mg Oral BID    mupirocin   Nasal BID    pantoprazole  40 mg Oral BID     PRN Meds:   acetaminophen    LORazepam    naloxone    ondansetron    promethazine (PHENERGAN) IVPB    sodium chloride 0.9%        Objective:     Vital Signs (Most Recent):  Temp: 97.8 °F (36.6 °C) (12/13/20 0758)  Pulse: 73 (12/13/20 0929)  Resp: 17 (12/13/20 0929)  BP: 117/61 (12/13/20 0758)  SpO2: 97 % (12/13/20 0929) Vital Signs (24h Range):  Temp:  [96.6 °F (35.9 °C)-97.8 °F (36.6 °C)] 97.8 °F (36.6 °C)  Pulse:  [71-84] 73  Resp:  [16-20] 17  SpO2:  [96 %-100 %] 97 %  BP: ()/(54-69) 117/61     Intake/Output - Last 3 Shifts       12/11 0700 - 12/12 0659 12/12 0700 - 12/13 0659 12/13 0700 - 12/14 0659    P.O. 240 2040     I.V. (mL/kg)  0 (0)     Other 125 0     Total Intake(mL/kg) 365 (5.2) 2040 (28.8)     Urine (mL/kg/hr) 575 (0.3) 1400 (0.8)     Emesis/NG output 300      Stool 175 1225     Total Output 1050 2625     Net -685 -585            Stool Occurrence 0 x 0 x           Physical Exam  Vitals signs and nursing note reviewed.   Constitutional:       Appearance: She is well-developed. She is not ill-appearing.   HENT:      Head: Normocephalic.   Eyes:      Pupils: Pupils are equal, round, and reactive to light.   Cardiovascular:      Rate and Rhythm: Normal rate and regular rhythm.      Heart sounds: Normal heart sounds.   Pulmonary:      Effort: Pulmonary effort is normal. No respiratory distress.      Breath sounds: Normal breath sounds. No wheezing or rales.   Abdominal:      Palpations: Abdomen is soft. There is no mass.       Tenderness: There is no guarding or rebound.      Comments: Stoma and urostomy functional  Excoriation over abdomen improved. No leak from either pouch   Genitourinary:     Comments: aleah neprh draining   Skin:     General: Skin is warm and dry.   Neurological:      Mental Status: She is alert and oriented to person, place, and time.   Psychiatric:         Behavior: Behavior normal.         Thought Content: Thought content normal.         Judgment: Judgment normal.             Significant Labs:  BMP (Last 3 Results):   Recent Labs   Lab 12/11/20 0438 12/12/20 0431 12/13/20  0309   GLU 83 83 80   * 147* 145   K 3.1* 4.1 4.0   * 120* 121*   CO2 16* 18* 18*   BUN 18 13 9   CREATININE 1.1 1.0 1.0   CALCIUM 10.0 9.4 9.5   MG 1.7 2.1  2.1 1.6     CBC (Last 3 Results):   Recent Labs   Lab 12/11/20 0438 12/12/20 0431 12/13/20  0309   WBC 5.32 4.72 4.83   RBC 3.29* 3.51* 3.48*   HGB 9.1* 9.5* 9.4*   HCT 31.2* 33.4* 33.7*    316 272   MCV 95 95 97   MCH 27.7 27.1 27.0   MCHC 29.2* 28.4* 27.9*       Significant Diagnostics:  CT 12/8  No acute abnormality identified in the abdomen or pelvis.     Bilateral femoral vein and left iliac vein filling defects suggestive of DVTs, though mildly improved when compared with 11/04/2020.     Stable bilateral hydroureteronephrosis.  Postoperative changes of left lower quadrant transverse colon urinary conduit and right lower quadrant ileostomy.

## 2020-12-13 NOTE — PLAN OF CARE
POC reviewed with patient who verbalized understanding. VSS on RA. AAOX4. Remains free of falls and injury. Frequent weight shift encouraged. Pt up to chair.      - RLQ ileostomy, bag changed x1 during shift  - LLQ urostomy, adequate UO     Tolerating diet, denies nausea and pain. Telemetry being monitored running NSR. Educated on IS use. Patient denies chest pain & SOB. SCDS in place. No acute events. No distress noted. Bed in lowest position, call light within reach, frequent rounds made for safety.      WCTM

## 2020-12-14 PROCEDURE — 25000003 PHARM REV CODE 250: Performed by: NURSE PRACTITIONER

## 2020-12-14 PROCEDURE — 20600001 HC STEP DOWN PRIVATE ROOM

## 2020-12-14 PROCEDURE — 97168 OT RE-EVAL EST PLAN CARE: CPT

## 2020-12-14 PROCEDURE — 97164 PT RE-EVAL EST PLAN CARE: CPT

## 2020-12-14 PROCEDURE — 94761 N-INVAS EAR/PLS OXIMETRY MLT: CPT

## 2020-12-14 PROCEDURE — 97530 THERAPEUTIC ACTIVITIES: CPT

## 2020-12-14 PROCEDURE — 25000003 PHARM REV CODE 250: Performed by: STUDENT IN AN ORGANIZED HEALTH CARE EDUCATION/TRAINING PROGRAM

## 2020-12-14 PROCEDURE — 97535 SELF CARE MNGMENT TRAINING: CPT

## 2020-12-14 RX ADMIN — APIXABAN 5 MG: 5 TABLET, FILM COATED ORAL at 08:12

## 2020-12-14 RX ADMIN — PANTOPRAZOLE SODIUM 40 MG: 40 TABLET, DELAYED RELEASE ORAL at 08:12

## 2020-12-14 RX ADMIN — METOPROLOL TARTRATE 12.5 MG: 25 TABLET, FILM COATED ORAL at 08:12

## 2020-12-14 RX ADMIN — MUPIROCIN: 20 OINTMENT TOPICAL at 08:12

## 2020-12-14 RX ADMIN — PANTOPRAZOLE SODIUM 40 MG: 40 TABLET, DELAYED RELEASE ORAL at 09:12

## 2020-12-14 RX ADMIN — GABAPENTIN 100 MG: 100 CAPSULE ORAL at 10:12

## 2020-12-14 RX ADMIN — LORAZEPAM 0.5 MG: 0.5 TABLET ORAL at 10:12

## 2020-12-14 NOTE — PLAN OF CARE
Patient lying in bed awake, alert oriented x4, patient able to make needs known, patient on low fiber/residue diet tolerating well patient denies nausea/vomiting, patient denies pain, patient illeostomy and urostomy bag changed x2 this shift, patient educated on care of bags, call-light within reach, no distress noted @ this time.

## 2020-12-14 NOTE — PT/OT/SLP RE-EVAL
Physical Therapy Re-evaluation    Patient Name:  Edita Riley   MRN:  [id number]    Recommendations:     Discharge Recommendations:  home health PT, home health OT   Discharge Equipment Recommendations: none   Barriers to discharge: decreased functional mobility    Assessment:     Edita Riley is a 55 y.o. female admitted with a medical diagnosis of Colostomy care.  She presents with the following impairments/functional limitations:  weakness, impaired endurance, impaired functional mobilty, impaired self care skills, gait instability, impaired balance.  Tolerated session with c/o fatigue.  Re-eval performed as pt transferred to/from ICU following EGD.  Pt performing mobility with CGA.  Pt able to amb household distance with RW and demo decreased gait speed, mild FFP, increased BUE support, mild unsteadiness and requiring 2x seated rest break.  Pt safe to amb with assist of 1x person + RW (RW ordered).  Pt lethargic and minimal communication at beginning of session but improved once transferred to sitting EOB.  Pt would benefit from continued skilled acute PT 3x/wk to improve functional mobility.  Recommending pt receive PT services in HH setting following d/c from hospital once medically cleared.      Rehab Prognosis:  Good; patient would benefit from acute skilled PT services to address these deficits and reach maximum level of function.      Recent Surgery: Procedure(s) (LRB):  EGD (ESOPHAGOGASTRODUODENOSCOPY) (N/A) 3 Days Post-Op    Plan:     During this hospitalization, patient to be seen 3 x/week to address the above listed problems via gait training, therapeutic activities, therapeutic exercises, neuromuscular re-education  · Plan of Care Expires:  01/08/21   Plan of Care Reviewed with: patient    Subjective     Communicated with RN and OT prior to session.  Patient found HOB elevated with telemetry, peripheral IV(illeostomy) upon PT entry to room, agreeable to evaluation.      Chief  "Complaint: fatigue  Patient comments/goals: to return home, "I need to walk like this more often"  Pain/Comfort:  · Pain Rating 1: 0/10    Patients cultural, spiritual, Christian conflicts given the current situation: no      Objective:     Patient found with: telemetry, peripheral IV(illeostomy)     General Precautions: Standard, fall   Orthopedic Precautions:N/A   Braces: N/A     Exams:  · Cognitive Exam:  Patient is oriented to Person, Place, Time and Situation  · RLE ROM: WFL  · RLE Strength: WFL  · LLE ROM: WFL  · LLE Strength: WFL    Functional Mobility:  · Bed Mobility:     · Rolling Left:  contact guard assistance  · Scooting: contact guard assistance  · Supine to Sit: contact guard assistance  · Transfers:     · Sit to Stand:  contact guard assistance with rolling walker  · Bed to Chair: contact guard assistance with  rolling walker  using  Step Transfer  · Gait: 50ft, 100ft, 150ft with RW CGA   · demo decreased gait speed, mild FFP, increased BUE support, mild unsteadiness and requiring 2x seated rest break  · Balance: standing (CGA)    AM-PAC 6 CLICK MOBILITY  Total Score:18       Therapeutic Activities and Exercises:  Pt educated on: PT role/POC; safety c mobility; benefits of OOB activities; performing therex; d/c recs - v/u  -sat EOB x3mins  -sit<>stand 4x  -standing x2mins  -set up in chair with breakfast    Patient left up in chair with all lines intact, call button in reach and RN notified.    GOALS:   Multidisciplinary Problems     Physical Therapy Goals        Problem: Physical Therapy Goal    Goal Priority Disciplines Outcome Goal Variances Interventions   Physical Therapy Goal     PT, PT/OT Ongoing, Progressing     Description: Goals to be met by: 2021     Patient will increase functional independence with mobility by performin. Sit to stand transfer with Supervision  2. Bed to chair transfer with Supervision using LRAD.  3. Gait  x 100ft feet with Supervision using LRAD.   4. " Ascend/descend 3 stair with left Handrails Stand-by Assistance using LRAD.                          History:     Past Medical History:   Diagnosis Date    Abnormal mammogram 8/25/2020    Anxiety     Cervical cancer 2014    Chronic back pain     Depression     Diarrhea due to malabsorption 11/14/2018    Fibromyalgia     Generalized abdominal pain 8/25/2020    GERD (gastroesophageal reflux disease)     Hiatal hernia 2014    History of cervical cancer 10/11/2018    History of cervical cancer 10/11/2018    Hx of psychiatric care     Cymbalta, trazodone    Hypertension     Hypomagnesemia 11/21/2018    Lactose intolerance     Metastatic squamous cell carcinoma to lymph node 10/11/2018    Nephrostomy tube displaced     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 drug 11/14/2018    Osteoarthritis of back     Psychiatric problem     Stroke 1972       Past Surgical History:   Procedure Laterality Date    ANTEGRADE NEPHROSTOGRAPHY Bilateral 9/28/2020    Procedure: Nephrostogram - antegrade;  Surgeon: Leslie Balbuena MD;  Location: [facility] OR [facility]ST [facility];  Service: Urology;  Laterality: Bilateral;    BILATERAL OOPHORECTOMY  2015    CHOLECYSTECTOMY  11/09/2016    Done at [facility], path showed chronic cholecystitis and gallstones    cold knife conization  2014    COLECTOMY, RIGHT  9/17/2020    Procedure: COLECTOMY, RIGHT Extended;  Surgeon: Hammad Reynolds MD;  Location: [facility] OR 2ND FLR;  Service: Colon and Rectal;;    COLONOSCOPY  2014    [facility]    COLONOSCOPY N/A 5/18/2018    Procedure: COLONOSCOPY;  Surgeon: Arden Gutiérrez MD;  Location: [facility] (4TH FLR);  Service: Endoscopy;  Laterality: N/A;    COLONOSCOPY N/A 7/28/2020    Procedure: COLONOSCOPY;  Surgeon: Hammad Reynolds MD;  Location: [facility] (4TH FLR);  Service: Colon and Rectal;  Laterality: N/A;  covid test [place] 7/25    CYSTOSCOPY WITH URETEROSCOPY, RETROGRADE PYELOGRAPHY, AND INSERTION OF STENT  Bilateral 3/21/2020    Procedure: CYSTOSCOPY, WITH RETROGRADE PYELOGRAM,;  Surgeon: Leslie Balbuena MD;  [facility]LR;  Service: Urology;  Laterality: Bilateral;    ESOPHAGOGASTRODUODENOSCOPY  2014    ESOPHAGOGASTRODUODENOSCOPY  11/18/2020    ESOPHAGOGASTRODUODENOSCOPY N/A 11/18/2020    Procedure: ESOPHAGOGASTRODUODENOSCOPY (EGD);  Surgeon: Zenon Spencer MD;  Location: [facility];  Service: Endoscopy;  Laterality: N/A;    HYSTERECTOMY  2014    TV for cervical cancer    ILEOSTOMY  9/17/2020    Procedure: CREATION, ILEOSTOMY;  Surgeon: Hammad Reynolds MD;  Location: [facility] OR 2ND FLR;  Service: Colon and Rectal;;    MOBILIZATION OF SPLENIC FLEXURE N/A 9/11/2020    Procedure: MOBILIZATION, COLONIC;  Surgeon: Hammad Reynolds MD;  Location: [facility] OR 2ND FLR;  Service: Colon and Rectal;  Laterality: N/A;    OOPHORECTOMY      RETROPERITONEAL LYMPHADENECTOMY Right 9/17/2018    Procedure: LYMPHADENECTOMY, RETROPERITONEUM;  Surgeon: Sebas Reed MD;  Location: [facility] OR 2ND FLR;  Service: General;  Laterality: Right;  ILIAC       Time Tracking:     PT Received On: 12/14/20  PT Start Time: 0822     PT Stop Time: 0840  PT Total Time (min): 18 min     Billable Minutes: Re-eval 10 min and Therapeutic Activity 8 min      John Bowen, PT  12/14/2020

## 2020-12-14 NOTE — PT/OT/SLP RE-EVAL
"Occupational Therapy   [id number]/Treatment with PT    Name: Edita Riley  MRN: [id number]  Admitting Diagnosis:  Colostomy care 3 Days Post-Op    Recommendations:     Discharge Recommendations: home health OT, home health PT  Discharge Equipment Recommendations:  none  Barriers to discharge:  None    Assessment:     Edita Riley is a 55 y.o. female with a medical diagnosis of Colostomy care.  She presents with fatigue. Re-evaluation completed as pt transferred to/from ICU following EGD. Pt initially with flat affect requiring encouragement to participate, but increased motivation and participate throughout treatment. Patient is currently performing ADLs and functional mobility below baseline.  Performance deficits affecting function are weakness, impaired endurance, impaired self care skills, impaired functional mobilty, gait instability, impaired balance.  Pt would benefit from skilled OT services in order to maximize independence with ADLs and facilitate safe discharge. Pt would benefit from home health OT once medically stable for discharge.     Rehab Prognosis:  Good; patient would benefit from acute skilled OT services to address these deficits and reach maximum level of function.       Plan:     Patient to be seen 3 x/week to address the above listed problems via self-care/home management, therapeutic activities, therapeutic exercises  · Plan of Care Expires: 01/13/21  · Plan of Care Reviewed with: patient    Subjective     Chief Complaint: "It feels good to get up and move"  Patient/Family stated goals: to return home  Communicated with: RN and PT prior to session.  Pain/Comfort:  · Pain Rating 1: 0/10    Objective:     Communicated with: RN and PT prior to session.  Patient found HOB elevated with: telemetry, peripheral IV(ileostomy) upon OT entry to room.    General Precautions: Standard, fall   Orthopedic Precautions:N/A   Braces: N/A     Occupational Performance:    Bed " Mobility:    · Patient completed Rolling/Turning to Left with  contact guard assistance  · Patient completed Scooting/Bridging with contact guard assistance  · Patient completed Supine to Sit with contact guard assistance    Functional Mobility/Transfers:  · Patient completed Sit <> Stand Transfer with contact guard assistance  with  rolling walker   · Patient completed Bed <> Chair Transfer using Step Transfer technique with contact guard assistance with rolling walker  · Functional Mobility: Pt ambulated household distances with 2 seated rest breaks with CGA and RW in order to maximize functional activity tolerance and standing balance required for engagement in occupations of choice.    · Chair follow for safety  · No LOB, SOB, or c/o of dizziness    Activities of Daily Living:  · Grooming: contact guard assistance to perform facial hygiene seated unsupported EOB  · Upper Body Dressing: minimum assistance to don gown simulating jacket seated unsupported EOB  · Toileting: suzi pads replaced 2' soiled   · Feeding: set-up A for breakfast seated in bedside chair at conclusion of session    Cognitive/Visual Perceptual:  Cognitive/Psychosocial Skills:     -       Oriented to: Person, Place, Time and Situation   -       Follows Commands/attention:Follows one-step commands  -       Communication: clear/fluent  -       Memory: No Deficits noted  -       Safety awareness/insight to disability: intact   -       Mood/Affect/Coping skills/emotional control: Flat affect    Physical Exam:  Sensation:    -       Intact  Upper Extremity Range of Motion:     -       Right Upper Extremity: WFL  -       Left Upper Extremity: WFL  Upper Extremity Strength:    -       Right Upper Extremity: WFL  -       Left Upper Extremity: WFL   Strength:    -       Right Upper Extremity: WFL  -       Left Upper Extremity: WFL  Fine Motor Coordination:    -       Intact    AMPAC 6 Click:  AMPAC Total Score: 21    Treatment &  Education:  -Therapist provided facilitation and instruction of proper body mechanics, energy conservation, and fall prevention strategies during tasks listed above.  -Co-tx with PT performed due to need for education and assistance from two skilled therapy disciplines at pt's current functional level.    -Pt educated on role of OT, POC and goals for therapy  -Pt educated on importance of OOB activities with staff member assistance and sitting OOB majority of the day.   -Pt verbalized understanding. Pt expressed no further concerns/questions  -Whiteboard updated   -C&D RW ordered for pt's room  Education:    Patient left up in chair with all lines intact, call button in reach and RN notified    GOALS:   Multidisciplinary Problems     Occupational Therapy Goals        Problem: Occupational Therapy Goal    Goal Priority Disciplines Outcome Interventions   Occupational Therapy Goal     OT, PT/OT Ongoing, Progressing    Description: Goals to be met by: 12/28/20     Patient will increase functional independence with ADLs by performing:    Feeding with Will.  UE Dressing with Will.  LE Dressing with Stand-by Assistance.  Grooming while standing at sink with Stand-by Assistance.  Toileting from toilet with Stand-by Assistance for hygiene and clothing management.   Toilet transfer to toilet with Stand-by Assistance.                     History:     Past Medical History:   Diagnosis Date    Abnormal mammogram 8/25/2020    Anxiety     Cervical cancer 2014    Chronic back pain     Depression     Diarrhea due to malabsorption 11/14/2018    Fibromyalgia     Generalized abdominal pain 8/25/2020    GERD (gastroesophageal reflux disease)     Hiatal hernia 2014    History of cervical cancer 10/11/2018    History of cervical cancer 10/11/2018    Hx of psychiatric care     Cymbalta, trazodone    Hypertension     Hypomagnesemia 11/21/2018    Lactose intolerance     Metastatic squamous cell carcinoma to  lymph node 10/11/2018    Nephrostomy tube displaced     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 drug 11/14/2018    Osteoarthritis of back     Psychiatric problem     Stroke 1972       Past Surgical History:   Procedure Laterality Date    ANTEGRADE NEPHROSTOGRAPHY Bilateral 9/28/2020    Procedure: Nephrostogram - antegrade;  Surgeon: Leslie Balbuena MD;  Location: [facility] OR [facility]ST [facility];  Service: Urology;  Laterality: Bilateral;    BILATERAL OOPHORECTOMY  2015    CHOLECYSTECTOMY  11/09/2016    Done at [facility], path showed chronic cholecystitis and gallstones    cold knife conization  2014    COLECTOMY, RIGHT  9/17/2020    Procedure: COLECTOMY, RIGHT Extended;  Surgeon: Hammad Reynolds MD;  Location: [facility] OR 2ND FLR;  Service: Colon and Rectal;;    COLONOSCOPY  2014    COLONOSCOPY N/A 10/24/2016    at [facility], Dr Gutiérrez    COLONOSCOPY N/A 5/18/2018    Procedure: COLONOSCOPY;  Surgeon: Arden Gutiérrez MD;  Location: [facility] (4TH FLR);  Service: Endoscopy;  Laterality: N/A;    COLONOSCOPY N/A 7/28/2020    Procedure: COLONOSCOPY;  Surgeon: Hammad Reynolds MD;  Location: [facility] (4TH FLR);  Service: Colon and Rectal;  Laterality: N/A;  covid test [place] 7/25    CYSTOSCOPY WITH URETEROSCOPY, RETROGRADE PYELOGRAPHY, AND INSERTION OF STENT Bilateral 3/21/2020    Procedure: CYSTOSCOPY, WITH RETROGRADE PYELOGRAM,;  Surgeon: Leslie Balbuena MD;  [facility];  Service: Urology;  Laterality: Bilateral;    ESOPHAGOGASTRODUODENOSCOPY  2014    ESOPHAGOGASTRODUODENOSCOPY  11/18/2020    ESOPHAGOGASTRODUODENOSCOPY N/A 11/18/2020    Procedure: ESOPHAGOGASTRODUODENOSCOPY (EGD);  Surgeon: Zenon Spencer MD;  Location: [facility] END[facility];  Service: Endoscopy;  Laterality: N/A;    HYSTERECTOMY  2014    TV for cervical cancer    ILEOSTOMY  9/17/2020    Procedure: CREATION, ILEOSTOMY;  Surgeon: Hammad Reynolds MD;  Location: [facility] OR 2ND FLR;  Service: Colon and Rectal;;    MOBILIZATION OF SPLENIC FLEXURE N/A  9/11/2020    Procedure: MOBILIZATION, COLONIC;  Surgeon: Hammad Reynolds MD;  [facility] 2ND FLR;  Service: Colon and Rectal;  Laterality: N/A;    OOPHORECTOMY      RETROPERITONEAL LYMPHADENECTOMY Right 9/17/2018    Procedure: LYMPHADENECTOMY, RETROPERITONEUM;  Surgeon: Sebas Reed MD;  Location: [facility] OR 2ND FLR;  Service: General;  Laterality: Right;  ILIAC       Time Tracking:     OT Date of Treatment: 12/14/20  OT Start Time: 0822  OT Stop Time: 0841  OT Total Time (min): 19 min    Billable Minutes:Evaluation 10  Self Care/Home Management 9    Stephanie Sales OT  12/14/2020

## 2020-12-14 NOTE — PLAN OF CARE
Referral was sent to [facility]. Awaiting HH orders.      12/14/20 1555   Post-Acute Status   Post-Acute Authorization Home [facility]   Home Health Status Referrals Sent   Eladia Staples RN, CM   Ext: [phone]

## 2020-12-14 NOTE — ASSESSMENT & PLAN NOTE
[facility] 55 [facility] with history of cervical cancer s/p radiation with resulting BL ureteral strictures s/p BL nephrostomy tube placement. On 9/11/20 she underwent creation of a transverse colon urinary conduit (Dr. Reynolds, Dr. Balbuena). Her post-operative course was complicated by a bowel perforation s/p extended right hemicolectomy and ileostomy creation (9/17/20). Subsequently she required IR drainage of a pelvic collection (9/23/20) and longterm IV antibiotics/antifungals. She was discharged from the hospital to an LTAC on 10/13/20, was in LTC for almost 60 days, home for 4 days, family having difficulty caring for pt, not eating at home and difficulty keeping appliance over ileostomy    Consult wound care  PT/OT  Calorie count    Per wound care having difficulty getting family to be available for wound care, pt still having issues with leakage from ostomy, will try convex and belt today  Social service to talke to pt about dc plans

## 2020-12-14 NOTE — PROGRESS NOTES
[facility]  Colorectal Surgery  Progress Note    Patient Name: Edita Riley  MRN: [id number]  Admission Date: 12/8/2020  Hospital Length of Stay: 6 days  Attending Physician: Hammad Reynolds MD    Subjective:     Interval History: no acute events overnite, appetite improving, still having issues keeping seal on ileosotmy bag, good pain control    Post-Op Info:  Procedure(s) (LRB):  EGD (ESOPHAGOGASTRODUODENOSCOPY) (N/A)   3 Days Post-Op      Medications:  Continuous Infusions:  Scheduled Meds:   apixaban  5 mg Oral BID    gabapentin  100 mg Oral QHS    metoprolol tartrate  12.5 mg Oral BID    mupirocin   Nasal BID    pantoprazole  40 mg Oral BID     PRN Meds:   acetaminophen    LORazepam    naloxone    ondansetron    promethazine (PHENERGAN) IVPB    sodium chloride 0.9%        Objective:     Vital Signs (Most Recent):  Temp: 97.8 °F (36.6 °C) (12/14/20 0713)  Pulse: 104 (12/14/20 0853)  Resp: 20 (12/14/20 0713)  BP: 102/68 (12/14/20 0853)  SpO2: 99 % (12/14/20 0713) Vital Signs (24h Range):  Temp:  [97.6 °F (36.4 °C)-98.2 °F (36.8 °C)] 97.8 °F (36.6 °C)  Pulse:  [] 104  Resp:  [16-20] 20  SpO2:  [96 %-100 %] 99 %  BP: ()/(53-68) 102/68     Intake/Output - Last 3 Shifts       12/12 0700 - 12/13 0659 12/13 0700 - 12/14 0659 12/14 0700 - 12/15 0659    P.O. 2040 1800     I.V. (mL/kg) 0 (0) 0 (0)     Other 0 0     Total Intake(mL/kg) 2040 (28.8) 1800 (25.4)     Urine (mL/kg/hr) 1400 (0.8) 950 (0.6)     Emesis/NG output       Stool 1225 650     Total Output 2625 1600     Net -585 +200            Urine Occurrence  1 x     Stool Occurrence 0 x 2 x           Physical Exam  Vitals signs and nursing note reviewed.   Constitutional:       Appearance: She is well-developed. She is not ill-appearing.   HENT:      Head: Normocephalic.   Eyes:      Pupils: Pupils are equal, round, and reactive to light.   Cardiovascular:      Rate and Rhythm: Normal rate and regular rhythm.       Heart sounds: Normal heart sounds.   Pulmonary:      Effort: Pulmonary effort is normal. No respiratory distress.      Breath sounds: Normal breath sounds. No wheezing or rales.   Abdominal:      Palpations: Abdomen is soft. There is no mass.      Tenderness: There is no guarding or rebound.      Comments: Urostomy and ileostomy functional     Skin:     General: Skin is warm and dry.   Neurological:      Mental Status: She is alert and oriented to person, place, and time.   Psychiatric:         Behavior: Behavior normal.         Thought Content: Thought content normal.         Judgment: Judgment normal.           Significant Labs:  BMP (Last 3 Results):   Recent Labs   Lab 12/11/20 0438 12/12/20 0431 12/13/20  0309   GLU 83 83 80   * 147* 145   K 3.1* 4.1 4.0   * 120* 121*   CO2 16* 18* 18*   BUN 18 13 9   CREATININE 1.1 1.0 1.0   CALCIUM 10.0 9.4 9.5   MG 1.7 2.1  2.1 1.6     CBC (Last 3 Results):   Recent Labs   Lab 12/11/20 0438 12/12/20 0431 12/13/20  0309   WBC 5.32 4.72 4.83   RBC 3.29* 3.51* 3.48*   HGB 9.1* 9.5* 9.4*   HCT 31.2* 33.4* 33.7*    316 272   MCV 95 95 97   MCH 27.7 27.1 27.0   MCHC 29.2* 28.4* 27.9*       Significant Diagnostics:  None    Assessment/Plan:     * Colostomy care  Per wound care still having issues with leakage from ileostomy, will try convex and belt today  Family still not available for wound care lesson    Acute deep vein thrombosis (DVT) of lower extremity  Restarted Eliquis    FTT (failure to thrive) in adult  Edita [facility]in [facility] is a 55 y.o. female with history of cervical cancer s/p radiation with resulting BL ureteral strictures s/p BL nephrostomy tube placement. On 9/11/20 she underwent creation of a transverse colon urinary conduit (Dr. Reynolds, Dr. Balbuena). Her post-operative course was complicated by a bowel perforation s/p extended right hemicolectomy and ileostomy creation (9/17/20). Subsequently she required IR drainage of a pelvic collection  (9/23/20) and longterm IV antibiotics/antifungals. She was discharged from the hospital to an LTAC on 10/13/20, was in LTC for almost 60 days, home for 4 days, family having difficulty caring for pt, not eating at home and difficulty keeping appliance over ileostomy    Consult wound care  PT/OT  Calorie count    Per wound care having difficulty getting family to be available for wound care, pt still having issues with leakage from ostomy, will try convex and belt today  Social service to talke to pt about dc plans      Hypovolemia  D/c IVFs today    Bilateral ureteral obstruction  aleah nephrostomy tubes functional, removed  Appreciate urology recs    Gastroesophageal reflux disease with esophagitis  EGD with gastritis and esophagitis  Protonix changed to BID  No n./v today  Advance to LRD    Impaired functional mobility, balance, gait, and endurance  PT/OT  Await reassess ment from PT/OT after transfer to ICU    Essential hypertension  Chronic, controlled.  Latest blood pressure and vitals reviewed-   Temp:  [97.6 °F (36.4 °C)-98.2 °F (36.8 °C)]   Pulse:  []   Resp:  [16-20]   BP: ()/(53-68)   SpO2:  [96 %-100 %] .   Home meds for hypertension were reviewed and noted below. Hospital anti-hypertensive changes were made as shown below.  Hypertension Medications             metoprolol tartrate (LOPRESSOR) 25 MG tablet Take 0.5 tablets (12.5 mg total) by mouth 2 (two) times daily.        Will utilize p.r.n. blood pressure medication only if patient's blood pressure greater than  180/110 and she develops symptoms such as worsening chest pain or shortness of breath.          Kerri Castillo NP  Colorectal Surgery  [facility]

## 2020-12-14 NOTE — ASSESSMENT & PLAN NOTE
Per wound care still having issues with leakage from ileostomy, will try convex and belt today  Family still not available for wound care lesson

## 2020-12-14 NOTE — PLAN OF CARE
Problem: Occupational Therapy Goal  Goal: Occupational Therapy Goal  Description: Goals to be met by: 12/28/20     Patient will increase functional independence with ADLs by performing:    Feeding with Leonard.  UE Dressing with [place].  LE Dressing with Stand-by Assistance.  Grooming while standing at sink with Stand-by Assistance.  Toileting from toilet with Stand-by Assistance for hygiene and clothing management.   Toilet transfer to toilet with Stand-by Assistance.    Outcome: Ongoing, Progressing     Re-evaluation complete-see note for details.    PEREZ Quevedo  12/14/2020   Pager #: [phone]

## 2020-12-14 NOTE — PROGRESS NOTES
Ostomy care follow-up  The RMQ ileostomy has MASD/IAD to socorro-stomal area. The pouch is leaking.  The pink stoma is even with the skin, ~ 20 mm. There is partial thickness skin loss to the socorro-stomal area.  The skin was cleansed with warm plain tap water, dried then marathon applied to the socorro-stomal skin- tolerated well.   A Coloplast Keyur Sensura pouch was placed over the stoma with paste applied around the opening. Warmth was added. The pouch is intact/sealed to skin.   The urostomy pouch was changed- opening cut too big and socorro-stomal skin beginning to get red.  The urostomy pouch was changed- stoma is pink/moist, at skin level and ~ 20mm.  skin cleansed with pain tap water, pat dried, cavilon barrier film applied to socorro-stomal area, paste placed around opening of the coloplast urostomy pouch, warmth applied, seal intact.    The patient can tell you the steps to change her pouch, size for the openings, when to change the pouch but makes no effort to change the pouch or participate.  She states her  and herself change the pouches at home.  We discussed using the convexity pouch with a belt to secure the pouch to the skin.   Patient expressed understanding and supplies at bedside.  She said no one in her family wound be available for a lesson.   Ostomy care to follow-up michele Peterson RN, CWCN  [id number]  Ileostomy site

## 2020-12-14 NOTE — PLAN OF CARE
Problem: Physical Therapy Goal  Goal: Physical Therapy Goal  Description: Goals to be met by: 2021     Patient will increase functional independence with mobility by performin. Sit to stand transfer with Supervision  2. Bed to chair transfer with Supervision using LRAD.  3. Gait  x 100ft feet with Supervision using LRAD.   4. Ascend/descend 3 stair with left Handrails Stand-by Assistance using LRAD.     Outcome: Ongoing, Progressing  Re-Eval completed and POC established    John Bowen, PT,DPT  2020

## 2020-12-14 NOTE — ANESTHESIA POSTPROCEDURE EVALUATION
Anesthesia Post Evaluation    [facility]    Procedure(s) Performed: Procedure(s) (LRB):  EGD (ESOPHAGOGASTRODUODENOSCOPY) (N/A)    Final Anesthesia Type: general    Patient location during evaluation: PACU  Patient participation: Yes- Able to Participate  Level of consciousness: awake and alert and oriented  Post-procedure vital signs: reviewed and stable  Pain management: adequate  Airway patency: patent    PONV status at discharge: No PONV  Anesthetic complications: no      Cardiovascular status: blood pressure returned to baseline and hemodynamically stable  Respiratory status: unassisted, room air and spontaneous ventilation  Hydration status: euvolemic  Follow-up not needed.          Vitals Value Taken Time   /58 12/14/20 0447   Temp 36.7 °C (98 °F) 12/14/20 0447   Pulse 75 12/14/20 0447   Resp 18 12/14/20 0447   SpO2 100 % 12/14/20 0447         Event Time   Out of Recovery 13:46:43         Pain/Caitie Score: No data recorded

## 2020-12-14 NOTE — SUBJECTIVE & OBJECTIVE
Subjective:     Interval History: no acute events overnite, appetite improving, still having issues keeping seal on ileosotmy bag, good pain control    Post-Op Info:  Procedure(s) (LRB):  EGD (ESOPHAGOGASTRODUODENOSCOPY) (N/A)   3 Days Post-Op      Medications:  Continuous Infusions:  Scheduled Meds:   apixaban  5 mg Oral BID    gabapentin  100 mg Oral QHS    metoprolol tartrate  12.5 mg Oral BID    mupirocin   Nasal BID    pantoprazole  40 mg Oral BID     PRN Meds:   acetaminophen    LORazepam    naloxone    ondansetron    promethazine (PHENERGAN) IVPB    sodium chloride 0.9%        Objective:     Vital Signs (Most Recent):  Temp: 97.8 °F (36.6 °C) (12/14/20 0713)  Pulse: 104 (12/14/20 0853)  Resp: 20 (12/14/20 0713)  BP: 102/68 (12/14/20 0853)  SpO2: 99 % (12/14/20 0713) Vital Signs (24h Range):  Temp:  [97.6 °F (36.4 °C)-98.2 °F (36.8 °C)] 97.8 °F (36.6 °C)  Pulse:  [] 104  Resp:  [16-20] 20  SpO2:  [96 %-100 %] 99 %  BP: ()/(53-68) 102/68     Intake/Output - Last 3 Shifts       12/12 0700 - 12/13 0659 12/13 0700 - 12/14 0659 12/14 0700 - 12/15 0659    P.O. 2040 1800     I.V. (mL/kg) 0 (0) 0 (0)     Other 0 0     Total Intake(mL/kg) 2040 (28.8) 1800 (25.4)     Urine (mL/kg/hr) 1400 (0.8) 950 (0.6)     Emesis/NG output       Stool 1225 650     Total Output 2625 1600     Net -585 +200            Urine Occurrence  1 x     Stool Occurrence 0 x 2 x           Physical Exam  Vitals signs and nursing note reviewed.   Constitutional:       Appearance: She is well-developed. She is not ill-appearing.   HENT:      Head: Normocephalic.   Eyes:      Pupils: Pupils are equal, round, and reactive to light.   Cardiovascular:      Rate and Rhythm: Normal rate and regular rhythm.      Heart sounds: Normal heart sounds.   Pulmonary:      Effort: Pulmonary effort is normal. No respiratory distress.      Breath sounds: Normal breath sounds. No wheezing or rales.   Abdominal:      Palpations: Abdomen is soft.  There is no mass.      Tenderness: There is no guarding or rebound.      Comments: Urostomy and ileostomy functional     Skin:     General: Skin is warm and dry.   Neurological:      Mental Status: She is alert and oriented to person, place, and time.   Psychiatric:         Behavior: Behavior normal.         Thought Content: Thought content normal.         Judgment: Judgment normal.           Significant Labs:  BMP (Last 3 Results):   Recent Labs   Lab 12/11/20 0438 12/12/20 0431 12/13/20  0309   GLU 83 83 80   * 147* 145   K 3.1* 4.1 4.0   * 120* 121*   CO2 16* 18* 18*   BUN 18 13 9   CREATININE 1.1 1.0 1.0   CALCIUM 10.0 9.4 9.5   MG 1.7 2.1  2.1 1.6     CBC (Last 3 Results):   Recent Labs   Lab 12/11/20 0438 12/12/20  0431 12/13/20  0309   WBC 5.32 4.72 4.83   RBC 3.29* 3.51* 3.48*   HGB 9.1* 9.5* 9.4*   HCT 31.2* 33.4* 33.7*    316 272   MCV 95 95 97   MCH 27.7 27.1 27.0   MCHC 29.2* 28.4* 27.9*       Significant Diagnostics:  None

## 2020-12-15 DIAGNOSIS — Z43.6 ATTENTION TO UROSTOMY: Primary | ICD-10-CM

## 2020-12-15 DIAGNOSIS — Z43.2 ATTENTION TO ILEOSTOMY: ICD-10-CM

## 2020-12-15 LAB
BASOPHILS # BLD AUTO: 0.04 K/UL (ref 0–0.2)
BASOPHILS NFR BLD: 0.7 % (ref 0–1.9)
DIFFERENTIAL METHOD: ABNORMAL
EOSINOPHIL # BLD AUTO: 0.2 K/UL (ref 0–0.5)
EOSINOPHIL NFR BLD: 2.9 % (ref 0–8)
ERYTHROCYTE [DISTWIDTH] IN BLOOD BY AUTOMATED COUNT: 20.3 % (ref 11.5–14.5)
HCT VFR BLD AUTO: 42.5 % (ref 37–48.5)
HGB BLD-MCNC: 12 G/DL (ref 12–16)
IMM GRANULOCYTES # BLD AUTO: 0.02 K/UL (ref 0–0.04)
IMM GRANULOCYTES NFR BLD AUTO: 0.3 % (ref 0–0.5)
LYMPHOCYTES # BLD AUTO: 1.7 K/UL (ref 1–4.8)
LYMPHOCYTES NFR BLD: 28.6 % (ref 18–48)
MCH RBC QN AUTO: 27.5 PG (ref 27–31)
MCHC RBC AUTO-ENTMCNC: 28.2 G/DL (ref 32–36)
MCV RBC AUTO: 98 FL (ref 82–98)
MONOCYTES # BLD AUTO: 0.7 K/UL (ref 0.3–1)
MONOCYTES NFR BLD: 11.3 % (ref 4–15)
NEUTROPHILS # BLD AUTO: 3.3 K/UL (ref 1.8–7.7)
NEUTROPHILS NFR BLD: 56.2 % (ref 38–73)
NRBC BLD-RTO: 0 /100 WBC
PLATELET # BLD AUTO: 174 K/UL (ref 150–350)
PMV BLD AUTO: 12 FL (ref 9.2–12.9)
RBC # BLD AUTO: 4.36 M/UL (ref 4–5.4)
WBC # BLD AUTO: 5.94 K/UL (ref 3.9–12.7)

## 2020-12-15 PROCEDURE — 20600001 HC STEP DOWN PRIVATE ROOM

## 2020-12-15 PROCEDURE — 85025 COMPLETE CBC W/AUTO DIFF WBC: CPT

## 2020-12-15 PROCEDURE — 25000003 PHARM REV CODE 250: Performed by: STUDENT IN AN ORGANIZED HEALTH CARE EDUCATION/TRAINING PROGRAM

## 2020-12-15 PROCEDURE — 36415 COLL VENOUS BLD VENIPUNCTURE: CPT

## 2020-12-15 PROCEDURE — 97116 GAIT TRAINING THERAPY: CPT | Mod: CQ

## 2020-12-15 PROCEDURE — 97530 THERAPEUTIC ACTIVITIES: CPT | Mod: CQ

## 2020-12-15 RX ADMIN — METOPROLOL TARTRATE 12.5 MG: 25 TABLET, FILM COATED ORAL at 09:12

## 2020-12-15 RX ADMIN — GABAPENTIN 100 MG: 100 CAPSULE ORAL at 09:12

## 2020-12-15 RX ADMIN — APIXABAN 5 MG: 5 TABLET, FILM COATED ORAL at 09:12

## 2020-12-15 RX ADMIN — PANTOPRAZOLE SODIUM 40 MG: 40 TABLET, DELAYED RELEASE ORAL at 09:12

## 2020-12-15 NOTE — PROGRESS NOTES
[facility]  Colorectal Surgery  Progress Note    Patient Name: Edita Riley  MRN: [id number]  Admission Date: 12/8/2020  Hospital Length of Stay: 7 days  Attending Physician: Hammad Reynolds MD    Subjective:     Interval History: no acute events overnite, no leakage from ileostomy last night, plan on discharge in am    Post-Op Info:  Procedure(s) (LRB):  EGD (ESOPHAGOGASTRODUODENOSCOPY) (N/A)   4 Days Post-Op      Medications:  Continuous Infusions:  Scheduled Meds:   apixaban  5 mg Oral BID    gabapentin  100 mg Oral QHS    metoprolol tartrate  12.5 mg Oral BID    pantoprazole  40 mg Oral BID     PRN Meds:   acetaminophen    LORazepam    naloxone    ondansetron    promethazine (PHENERGAN) IVPB    sodium chloride 0.9%        Objective:     Vital Signs (Most Recent):  Temp: 98.2 °F (36.8 °C) (12/15/20 1217)  Pulse: 89 (12/15/20 1217)  Resp: 14 (12/15/20 1217)  BP: 95/65 (12/15/20 1217)  SpO2: 100 % (12/15/20 1217) Vital Signs (24h Range):  Temp:  [97.6 °F (36.4 °C)-98.2 °F (36.8 °C)] 98.2 °F (36.8 °C)  Pulse:  [] 89  Resp:  [14-20] 14  SpO2:  [95 %-100 %] 100 %  BP: ()/(55-70) 95/65     Intake/Output - Last 3 Shifts       12/13 0700 - 12/14 0659 12/14 0700 - 12/15 0659 12/15 0700 - 12/16 0659    P.O. 1920 960 480    I.V. (mL/kg) 0 (0) 0 (0)     Other 0 0     Total Intake(mL/kg) 1920 (27.1) 960 (13.6) 480 (6.8)    Urine (mL/kg/hr) 950 (0.6) 825 (0.5)     Stool 450 1125     Total Output 1400 1950     Net +520 -990 +480           Urine Occurrence 1 x      Stool Occurrence 2 x 1 x 1 x          Physical Exam  Vitals signs and nursing note reviewed.   Constitutional:       Appearance: She is well-developed. She is not ill-appearing.   HENT:      Head: Normocephalic.   Eyes:      Pupils: Pupils are equal, round, and reactive to light.   Cardiovascular:      Rate and Rhythm: Normal rate and regular rhythm.      Heart sounds: Normal heart sounds.   Pulmonary:      Effort:  Pulmonary effort is normal. No respiratory distress.      Breath sounds: Normal breath sounds. No wheezing or rales.   Abdominal:      Palpations: Abdomen is soft. There is no mass.      Tenderness: There is no guarding or rebound.      Comments: Urostomy and ileostomy functional  Inc line healed   Skin:     General: Skin is warm and dry.   Neurological:      Mental Status: She is alert and oriented to person, place, and time.   Psychiatric:         Behavior: Behavior normal.         Thought Content: Thought content normal.         Judgment: Judgment normal.           Significant Labs:  BMP (Last 3 Results):   Recent Labs   Lab 12/11/20 0438 12/12/20 0431 12/13/20  0309   GLU 83 83 80   * 147* 145   K 3.1* 4.1 4.0   * 120* 121*   CO2 16* 18* 18*   BUN 18 13 9   CREATININE 1.1 1.0 1.0   CALCIUM 10.0 9.4 9.5   MG 1.7 2.1  2.1 1.6     CBC (Last 3 Results):   Recent Labs   Lab 12/11/20 0438 12/12/20 0431 12/13/20  0309   WBC 5.32 4.72 4.83   RBC 3.29* 3.51* 3.48*   HGB 9.1* 9.5* 9.4*   HCT 31.2* 33.4* 33.7*    316 272   MCV 95 95 97   MCH 27.7 27.1 27.0   MCHC 29.2* 28.4* 27.9*       Significant Diagnostics:  None    Assessment/Plan:     * Colostomy care  Per wound care still having issues with leakage from ileostomy, will try convex and belt today   her today for lesson, per wound care RN not very interested in learning    Acute deep vein thrombosis (DVT) of lower extremity  Restarted Eliquis    FTT (failure to thrive) in adult  Edita Hardin [facility] is a 55 y.o. female with history of cervical cancer s/p radiation with resulting BL ureteral strictures s/p BL nephrostomy tube placement. On 9/11/20 she underwent creation of a transverse colon urinary conduit (Dr. Reynolds, Dr. Balbuena). Her post-operative course was complicated by a bowel perforation s/p extended right hemicolectomy and ileostomy creation (9/17/20). Subsequently she required IR drainage of a pelvic collection (9/23/20) and  longterm IV antibiotics/antifungals. She was discharged from the hospital to an LTAC on 10/13/20, was in LTC for almost 60 days, home for 4 days, family having difficulty caring for pt, not eating at home and difficulty keeping appliance over ileostomy    Consult wound care  PT/OT  Calorie count    Per wound care having difficulty getting family to be available for wound care, pt still having issues with leakage from ostomy, will try convex and belt today  Social service to talke to pt about dc plans,  very resistant to taking her home, social service working with family      Hypovolemia  D/c IVFs today    Bilateral ureteral obstruction  aelah nephrostomy tubes functional, removed  Appreciate urology recs    Gastroesophageal reflux disease with esophagitis  EGD with gastritis and esophagitis  Protonix changed to BID  No n./v today  Advance to LRD    Impaired functional mobility, balance, gait, and endurance  PT/OT  Await reassess ment from PT/OT after transfer to ICU  PT/OT doing well, recommend home with home health    Essential hypertension  Chronic, controlled.  Latest blood pressure and vitals reviewed-   Temp:  [97.6 °F (36.4 °C)-98.2 °F (36.8 °C)]   Pulse:  []   Resp:  [14-20]   BP: ()/(55-70)   SpO2:  [95 %-100 %] .   Home meds for hypertension were reviewed and noted below. Hospital anti-hypertensive changes were made as shown below.  Hypertension Medications             metoprolol tartrate (LOPRESSOR) 25 MG tablet Take 0.5 tablets (12.5 mg total) by mouth 2 (two) times daily.        Will utilize p.r.n. blood pressure medication only if patient's blood pressure greater than  180/110 and she develops symptoms such as worsening chest pain or shortness of breath.          Kerri Castillo NP  Colorectal Surgery  [facility]

## 2020-12-15 NOTE — ASSESSMENT & PLAN NOTE
Chronic, controlled.  Latest blood pressure and vitals reviewed-   Temp:  [97.6 °F (36.4 °C)-98.2 °F (36.8 °C)]   Pulse:  []   Resp:  [14-20]   BP: ()/(55-70)   SpO2:  [95 %-100 %] .   Home meds for hypertension were reviewed and noted below. Hospital anti-hypertensive changes were made as shown below.  Hypertension Medications             metoprolol tartrate (LOPRESSOR) 25 MG tablet Take 0.5 tablets (12.5 mg total) by mouth 2 (two) times daily.        Will utilize p.r.n. blood pressure medication only if patient's blood pressure greater than  180/110 and she develops symptoms such as worsening chest pain or shortness of breath.

## 2020-12-15 NOTE — PLAN OF CARE
"Patient to discharge tomorrow morning.  Per progress note:  "no acute events overnite, no leakage from ileostomy last night, plan on discharge in am"     12/15/20 1536   Discharge Reassessment   Assessment Type Discharge Planning Reassessment   Discharge Plan A Home Health   Discharge Plan B Other  (TBD)   DME Needed Upon Discharge  wound care supplies   Anticipated Discharge Disposition Home-Health   Eladia Staples RN, CM   Ext: [phone]    "

## 2020-12-15 NOTE — PLAN OF CARE
Patient lying in bed awake alert oriented x 4, patient on low fiber/residue diet tolerating, patient denies pain, patient illeostomy and urostomy leaking upon change of shift, patient educated on care of both illeostomy and urostomy no evidence of learning noted, no distress noted, call-light within reach, will continue to monitor.

## 2020-12-15 NOTE — PROGRESS NOTES
Ostomy follow-up  The  is in the room for a lesson in applying a  Pouch. I discussed the process of changing the pouch as I did change the pouch.  The  occasionally asked questions about the size, how long will she have the stomas and need to have the pouches.  He spoke about working at the [employer], moving up and retiring.  Neither the  nor the patient involved themselves in assisting with the pouch changes.     The Ileostomy pouch is leaking green thin stool.   The socorro-stomal area is macerated, marathon remains in place. The skin was cleansed with plain tap water, dried with tissue then cavilon barrier film applied. The socorro-stomal area is moist/skin moist/peeling.  An fernando ring was placed around the stoma and the convexity pouch was placed over the stoma- (size 23mm, pink moist at skin level) and warmth applied.  A belt was applied to the ileostomy pouch and hy-tape to the sides.   The urostomy pouch was changed using plain tap water, tissue dried the skin, Cavilon barrier film to the socorro-stomal skin, fernando ring around the stoma then the flat pouch placed over the stoma (pink/moist, 23 mm, at skin level), warmth added, seals intact.     The  inquired about the temperature of the water, cutting the pouch- what number-- he was shown, and the fernando ring.   The patient was instructed to begin emptying both pouches today in preparation for discharge, the unit nurse Vijay was also informed to assist with emptying the pouches.     The , Gissel Grimaldo and Kerri Castillo NP- CRS, were notified of the  being available in the room.  The patient has made no effort to assist with changing the pouches or emptying.    Supplies at bedside, samples to be ordered.   ANH Peterson RN, [facility]  [id number]

## 2020-12-15 NOTE — PT/OT/SLP PROGRESS
Physical Therapy Treatment    Patient Name:  Edita Riley   MRN:  [id number]    Recommendations:     Discharge Recommendations:  home health PT   Discharge Equipment Recommendations: none   Barriers to discharge: decreased functional mobility    Assessment:     Edita Riley is a 55 y.o. female admitted with a medical diagnosis of Colostomy care.  She presents with the following impairments/functional limitations:  weakness, impaired endurance, impaired self care skills, impaired functional mobilty, gait instability, impaired balance. Pt tolerated session well with focus on transfers, gait training, and stair training. Pt progressing well but continues to require intermittent CGA and safety cues for safe use of RW. Pt with narrow WILLIAM during gait with nearly scissoring pattern. Pt will continue to benefit from therapy services to address impairments listed above.     Rehab Prognosis: Good; patient would benefit from acute skilled PT services to address these deficits and reach maximum level of function.    Recent Surgery: Procedure(s) (LRB):  EGD (ESOPHAGOGASTRODUODENOSCOPY) (N/A) 4 Days Post-Op    Plan:     During this hospitalization, patient to be seen 3 x/week to address the identified rehab impairments via gait training, therapeutic activities, therapeutic exercises, neuromuscular re-education and progress toward the following goals:    · Plan of Care Expires:  01/08/21    Subjective     Chief Complaint: no c/o  Pain/Comfort:  · Pain Rating 1: 0/10  · Pain Rating Post-Intervention 1: 0/10      Objective:     Communicated with NSG prior to session.  Patient found up in chair with telemetry upon PTA entry to room. Spouse at bedside.     General Precautions: Standard, fall   Orthopedic Precautions:N/A   Braces: N/A     Functional Mobility:  · Transfers:     · Sit to Stand:  stand by assistance with rolling walker  · Gait: Pt ambulates ~220 ft with RW and SBA to CGA. Pt requiring intermittent  CGA for balance and safety. Pt demonstrates slow karma, narrow WILLIAM, and near scissoring/tandem gait pattern. Cues on posture, improved use of RW, and widened WILLIAM/step placement.   · Stairs:  Pt ascended/descended 4 stair(s) with No Assistive Device with left handrail with Contact Guard Assistance.       AM-PAC 6 CLICK MOBILITY  Turning over in bed (including adjusting bedclothes, sheets and blankets)?: 3  Sitting down on and standing up from a chair with arms (e.g., wheelchair, bedside commode, etc.): 3  Moving from lying on back to sitting on the side of the bed?: 3  Moving to and from a bed to a chair (including a wheelchair)?: 3  Need to walk in hospital room?: 3  Climbing 3-5 steps with a railing?: 3  Basic Mobility Total Score: 18       Therapeutic Activities and Exercises:  Pt assisted with functional mobility as noted above.   Sit<>stand x 3 trials with RW and SBA. Cues on fully turning to line up with chair while using RW throughout process and hand placement for safe sitting.   Pt and spouse educated on safety with mobility, assistance needed, safe use of RW, and PT POC.     Patient left up in chair with all lines intact, call button in reach and spouse present..    GOALS:   Multidisciplinary Problems     Physical Therapy Goals        Problem: Physical Therapy Goal    Goal Priority Disciplines Outcome Goal Variances Interventions   Physical Therapy Goal     PT, PT/OT Ongoing, Progressing     Description: Goals to be met by: 2021     Patient will increase functional independence with mobility by performin. Sit to stand transfer with Supervision  2. Bed to chair transfer with Supervision using LRAD.  3. Gait  x 100ft feet with Supervision using LRAD.   4. Ascend/descend 3 stair with left Handrails Stand-by Assistance using LRAD.                          Time Tracking:     PT Received On: 12/15/20  PT Start Time: 1130     PT Stop Time: 1153  PT Total Time (min): 23 min     Billable Minutes: Gait  Training 13 and Therapeutic Activity 10    Treatment Type: Treatment  PT/PTA: PTA     PTA Visit Number: 1     James Nielsen PTA  12/15/2020

## 2020-12-15 NOTE — ASSESSMENT & PLAN NOTE
Per wound care still having issues with leakage from ileostomy, will try convex and belt today   her today for lesson, per wound care RN not very interested in learning

## 2020-12-15 NOTE — PLAN OF CARE
[facility]    HOME HEALTH ORDERS  FACE TO FACE ENCOUNTER    Patient Name: Edita Riley  YOB: 1965    PCP: Primary Doctor No   PCP Address: None  PCP Phone Number: None  PCP Fax: None    Encounter Date: 12/15/2020    Admit to Home Health    Diagnoses:  Active Hospital Problems    Diagnosis  POA    *Colostomy care [Z43.3]  Not Applicable    FTT (failure to thrive) in adult [R62.7]  Yes    Acute deep vein thrombosis (DVT) of lower extremity [I82.409]  Yes    Hypovolemia [E86.1]  Yes    Bilateral ureteral obstruction [N13.5]  Yes     Chronic    Gastroesophageal reflux disease with esophagitis [K21.00]  Yes     Chronic    Impaired functional mobility, balance, gait, and endurance [Z74.09]  Yes     Chronic    Essential hypertension [I10]  Yes     Chronic      Resolved Hospital Problems   No resolved problems to display.       Future Appointments   Date Time Provider Department Center   12/16/2020  2:20 PM Leslie Balbuena MD [facility] UROLOGY [facility]           I have seen and examined this patient face to face today. My clinical findings that support the need for the home health skilled services and home bound status are the following:  Weakness/numbness causing balance and gait disturbance due to Surgery making it taxing to leave home.    Allergies:  Review of patient's allergies indicates:   Allergen Reactions    Bee sting [allergen ext-venom-honey bee]      Rash      Grass pollen-bermuda, standard      rash       Diet: regular diet    Activities: no lifting, Driving, or Strenuous exercise for 6 weeks    Nursing:   SN to complete comprehensive assessment including routine vital signs. Instruct on disease process and s/s of complications to report to MD. Review/verify medication list sent home with the patient at time of discharge  and instruct patient/caregiver as needed. Frequency may be adjusted depending on start of care date.    Notify MD if SBP > 160 or < 90;  DBP > 90 or < 50; HR > 120 or < 50; Temp > 101; Other:         CONSULTS:    Physical Therapy to evaluate and treat. Evaluate for home safety and equipment needs; Establish/upgrade home exercise program. Perform / instruct on therapeutic exercises, gait training, transfer training, and Range of Motion.  Occupational Therapy to evaluate and treat. Evaluate home environment for safety and equipment needs. Perform/Instruct on transfers, ADL training, ROM, and therapeutic exercises.   to evaluate for community resources/long-range planning.    MISCELLANEOUS CARE:  Colostomy Care:  Instruct patient/caregiver to empty bag when full and PRN., Change and clean site every 48 hours and Monitor skin integrity.    WOUND CARE ORDERS  n/a        Ostomy nurse:  Has urostomy and ileostomy, continue ostomy care and education     Medications: Review discharge medications with patient and family and provide education.      Current Discharge Medication List      CONTINUE these medications which have NOT CHANGED    Details   apixaban (ELIQUIS) 5 mg Tab Take 1 tablet (5 mg total) by mouth 2 (two) times daily.  Qty: 60 tablet, Refills: 5      gabapentin (NEURONTIN) 100 MG capsule Take 1 capsule (100 mg total) by mouth every evening.  Qty: 30 capsule, Refills: 1      magnesium oxide (MAG-OX) 400 mg (241.3 mg magnesium) tablet Take 1 tablet (400 mg total) by mouth 2 (two) times daily.  Qty: 60 tablet, Refills: 1      metoprolol tartrate (LOPRESSOR) 25 MG tablet Take 0.5 tablets (12.5 mg total) by mouth 2 (two) times daily.  Qty: 30 tablet, Refills: 1    Comments: .             I certify that this patient is confined to her home and needs intermittent skilled nursing care, physical therapy and occupational therapy.

## 2020-12-15 NOTE — ASSESSMENT & PLAN NOTE
[facility] 55 y.[facility] female with history of cervical cancer s/p radiation with resulting BL ureteral strictures s/p BL nephrostomy tube placement. On 9/11/20 she underwent creation of a transverse colon urinary conduit (Dr. Reynolds, Dr. Balbuena). Her post-operative course was complicated by a bowel perforation s/p extended right hemicolectomy and ileostomy creation (9/17/20). Subsequently she required IR drainage of a pelvic collection (9/23/20) and longterm IV antibiotics/antifungals. She was discharged from the hospital to an LTAC on 10/13/20, was in LTC for almost 60 days, home for 4 days, family having difficulty caring for pt, not eating at home and difficulty keeping appliance over ileostomy    Consult wound care  PT/OT  Calorie count    Per wound care having difficulty getting family to be available for wound care, pt still having issues with leakage from ostomy, will try convex and belt today  Social service to talke to pt about dc plans,  very resistant to taking her home, social service working with family

## 2020-12-15 NOTE — SUBJECTIVE & OBJECTIVE
Subjective:     Interval History: no acute events overnite, no leakage from ileostomy last night, plan on discharge in am    Post-Op Info:  Procedure(s) (LRB):  EGD (ESOPHAGOGASTRODUODENOSCOPY) (N/A)   4 Days Post-Op      Medications:  Continuous Infusions:  Scheduled Meds:   apixaban  5 mg Oral BID    gabapentin  100 mg Oral QHS    metoprolol tartrate  12.5 mg Oral BID    pantoprazole  40 mg Oral BID     PRN Meds:   acetaminophen    LORazepam    naloxone    ondansetron    promethazine (PHENERGAN) IVPB    sodium chloride 0.9%        Objective:     Vital Signs (Most Recent):  Temp: 98.2 °F (36.8 °C) (12/15/20 1217)  Pulse: 89 (12/15/20 1217)  Resp: 14 (12/15/20 1217)  BP: 95/65 (12/15/20 1217)  SpO2: 100 % (12/15/20 1217) Vital Signs (24h Range):  Temp:  [97.6 °F (36.4 °C)-98.2 °F (36.8 °C)] 98.2 °F (36.8 °C)  Pulse:  [] 89  Resp:  [14-20] 14  SpO2:  [95 %-100 %] 100 %  BP: ()/(55-70) 95/65     Intake/Output - Last 3 Shifts       12/13 0700 - 12/14 0659 12/14 0700 - 12/15 0659 12/15 0700 - 12/16 0659    P.O. 1920 960 480    I.V. (mL/kg) 0 (0) 0 (0)     Other 0 0     Total Intake(mL/kg) 1920 (27.1) 960 (13.6) 480 (6.8)    Urine (mL/kg/hr) 950 (0.6) 825 (0.5)     Stool 450 1125     Total Output 1400 1950     Net +520 -990 +480           Urine Occurrence 1 x      Stool Occurrence 2 x 1 x 1 x          Physical Exam  Vitals signs and nursing note reviewed.   Constitutional:       Appearance: She is well-developed. She is not ill-appearing.   HENT:      Head: Normocephalic.   Eyes:      Pupils: Pupils are equal, round, and reactive to light.   Cardiovascular:      Rate and Rhythm: Normal rate and regular rhythm.      Heart sounds: Normal heart sounds.   Pulmonary:      Effort: Pulmonary effort is normal. No respiratory distress.      Breath sounds: Normal breath sounds. No wheezing or rales.   Abdominal:      Palpations: Abdomen is soft. There is no mass.      Tenderness: There is no guarding or  rebound.      Comments: Urostomy and ileostomy functional  Inc line healed   Skin:     General: Skin is warm and dry.   Neurological:      Mental Status: She is alert and oriented to person, place, and time.   Psychiatric:         Behavior: Behavior normal.         Thought Content: Thought content normal.         Judgment: Judgment normal.           Significant Labs:  BMP (Last 3 Results):   Recent Labs   Lab 12/11/20 0438 12/12/20 0431 12/13/20  0309   GLU 83 83 80   * 147* 145   K 3.1* 4.1 4.0   * 120* 121*   CO2 16* 18* 18*   BUN 18 13 9   CREATININE 1.1 1.0 1.0   CALCIUM 10.0 9.4 9.5   MG 1.7 2.1  2.1 1.6     CBC (Last 3 Results):   Recent Labs   Lab 12/11/20 0438 12/12/20 0431 12/13/20  0309   WBC 5.32 4.72 4.83   RBC 3.29* 3.51* 3.48*   HGB 9.1* 9.5* 9.4*   HCT 31.2* 33.4* 33.7*    316 272   MCV 95 95 97   MCH 27.7 27.1 27.0   MCHC 29.2* 28.4* 27.9*       Significant Diagnostics:  None

## 2020-12-15 NOTE — PLAN OF CARE
POC reviewed with pt, verbalized understanding. AAOx4. VSS on RA.     -Tolerating diet, denies n/v.   - Colostomy and urostomy with good output. No problems with leakage during shift.  - Up ambulating in room, up in chair.   - Plans for colostomy education per wound care nurse in AM with pt spouse.    Pain controlled. Bed in lowest position, Call light within reach. WCTM.

## 2020-12-15 NOTE — PLAN OF CARE
Discussed plan for discharge and need for pt to be able to manage ostomy pouches. Pt emptied both pouches with verbal cues and minimal assistance.

## 2020-12-15 NOTE — ASSESSMENT & PLAN NOTE
PT/OT  Await reassess ment from PT/OT after transfer to ICU  PT/OT doing well, recommend home with home health

## 2020-12-16 ENCOUNTER — TELEPHONE (OUTPATIENT)
Dept: SURGERY | Facility: CLINIC | Age: 55
End: 2020-12-16

## 2020-12-16 VITALS
OXYGEN SATURATION: 98 % | RESPIRATION RATE: 18 BRPM | HEIGHT: 69 IN | TEMPERATURE: 99 F | HEART RATE: 93 BPM | DIASTOLIC BLOOD PRESSURE: 59 MMHG | SYSTOLIC BLOOD PRESSURE: 98 MMHG | BODY MASS INDEX: 23.11 KG/M2 | WEIGHT: 156 LBS

## 2020-12-16 LAB
FINAL PATHOLOGIC DIAGNOSIS: NORMAL
GROSS: NORMAL
Lab: NORMAL

## 2020-12-16 PROCEDURE — 99232 SBSQ HOSP IP/OBS MODERATE 35: CPT | Mod: ,,, | Performed by: NURSE PRACTITIONER

## 2020-12-16 PROCEDURE — 99232 PR SUBSEQUENT HOSPITAL CARE,LEVL II: ICD-10-PCS | Mod: ,,, | Performed by: NURSE PRACTITIONER

## 2020-12-16 PROCEDURE — 25000003 PHARM REV CODE 250: Performed by: STUDENT IN AN ORGANIZED HEALTH CARE EDUCATION/TRAINING PROGRAM

## 2020-12-16 RX ORDER — PANTOPRAZOLE SODIUM 40 MG/1
40 TABLET, DELAYED RELEASE ORAL 2 TIMES DAILY
Qty: 60 TABLET | Refills: 6 | Status: ON HOLD | OUTPATIENT
Start: 2020-12-16 | End: 2021-01-05 | Stop reason: HOSPADM

## 2020-12-16 RX ADMIN — APIXABAN 5 MG: 5 TABLET, FILM COATED ORAL at 09:12

## 2020-12-16 RX ADMIN — PANTOPRAZOLE SODIUM 40 MG: 40 TABLET, DELAYED RELEASE ORAL at 09:12

## 2020-12-16 RX ADMIN — METOPROLOL TARTRATE 12.5 MG: 25 TABLET, FILM COATED ORAL at 09:12

## 2020-12-16 NOTE — PLAN OF CARE
12/16/20 1356   Post-Acute Status   Post-Acute Authorization Placement   Post-Acute Placement Status Pending Payor Review   [facility]  notified CM that they are no longer able to staff patient for skilled nursing at home  LAURA and EDWARDO met with pt at the bedside to discuss discharging home vs placement. She stated she had been at [facility] prior (after checking, she was actually at [facility]) and was only home for 4 days before being readmitted. She expressed she would like to avoid that happening again. She is unsure of how well she can care for herself when she is home alone and her  is caring for their daughter. EDWARDO and LAURA spoke with Dr. Reynolds and decided to pursue placement if possible to help with pt's fine motor skills and therapy, especially since home health was unobtainable.  EDWARDO sent referral to [facility] and found that she was appropriate. They have submitted for auth to insurance.    Gissel Grimaldo, DELPHINE  [facility]  [phone]

## 2020-12-16 NOTE — CONSULTS
[facility]  Physical Medicine & Rehab  Consult Note    Patient Name: Edita iRley  MRN: [id number]  Admission Date: 12/8/2020  Hospital Length of Stay: 8 days  Attending Physician: Hammad Reynolds MD     Inpatient consult to Physical Medicine & Rehabilitation  Consult performed by: Rylee Phelps NP  Consult requested by:  Hammad Reynolds MD    Reason for Consult:  assess rehabilitation needs    Consults  Subjective:     Principal Problem: Colostomy care    HPI: Per chart review, Edita Riley is a 55-year-old female with PMHx of stroke, cervical cancer s/p pelvic radiation and bilateral ureteral strictures and bilateral hydronephrosis R>L s/p percutaneous nephrostomy tubes, s/p open transverse colon conduit urinary diversion on 9/11/2020 with take back to OR 9/17 with colo-colic anastomosis intact, bowel perforation found, underwent resection of right colon, remaining transverses colon, and proximal descending colon, ileostomy creation, Charlotte's pouch created, anemia, moderate malnutrition. Transferred to [facility] on 10/13/20.    [place] [facility] [place] with nausea and emesis. Per CRS, no acute path on CT. GO for EGD with LA Grade B reflux esophagitis and gastric erosions without bleeding. Hospital course complicated by hypovolemia, Impaired functional mobility, balance, gait, and endurance, DVT LE (on eliquis), and leakage from ileostomy (wound care following).      Functional History: Prior to 09/2020 admission, Patient lives with spouse and daughter in a single  story home with 3 steps to enter.  Prior to admission, (I) with ADLs and mobility. DME: [facility]W    Hospital Course: 12/15:Sit to Stand SBA with rolling walker. Gait-220 ft with RW and SBA to CGA.  Pt ascended/descended 4 stair(s) with No Assistive Device with left handrail with Contact Guard Assistance.    Past Medical History:   Diagnosis Date    Abnormal mammogram 8/25/2020    Anxiety     Cervical cancer 2014     Chronic back pain     Depression     Diarrhea due to malabsorption 11/14/2018    Fibromyalgia     Generalized abdominal pain 8/25/2020    GERD (gastroesophageal reflux disease)     Hiatal hernia 2014    History of cervical cancer 10/11/2018    History of cervical cancer 10/11/2018    Hx of psychiatric care     Cymbalta, trazodone    Hypertension     Hypomagnesemia 11/21/2018    Lactose intolerance     Metastatic squamous cell carcinoma to lymph node 10/11/2018    Nephrostomy tube displaced     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 drug 11/14/2018    Osteoarthritis of back     Psychiatric problem     Stroke 1972     Past Surgical History:   Procedure Laterality Date    ANTEGRADE NEPHROSTOGRAPHY Bilateral 9/28/2020    Procedure: Nephrostogram - antegrade;  Surgeon: Leslie Balbuena MD;  Location: [facility] OR [address] [place] [address];  Service: Urology;  Laterality: Bilateral;    BILATERAL OOPHORECTOMY  2015    CHOLECYSTECTOMY  11/09/2016    Done at [facility], path showed chronic cholecystitis and gallstones    cold knife conization  2014    COLECTOMY, RIGHT  9/17/2020    Procedure: COLECTOMY, RIGHT Extended;  Surgeon: Hammad Reynolds MD;  Location: [facility] OR 2ND FLR;  Service: Colon and Rectal;;    COLONOSCOPY  2014    COLONOSCOPY N/A 10/24/2016    at [facility], Dr Gutiérrez    COLONOSCOPY N/A 5/18/2018    Procedure: COLONOSCOPY;  Surgeon: Arden Gutiérrez MD;  Location: [facility] (4TH FLR);  Service: Endoscopy;  Laterality: N/A;    COLONOSCOPY N/A 7/28/2020    Procedure: COLONOSCOPY;  Surgeon: Hammad Reynolds MD;  Location: [facility] ENDO (4TH FLR);  Service: Colon and Rectal;  Laterality: N/A;  covid test [place] 7/25    CYSTOSCOPY WITH URETEROSCOPY, RETROGRADE PYELOGRAPHY, AND INSERTION OF STENT Bilateral 3/21/2020    Procedure: CYSTOSCOPY, WITH RETROGRADE PYELOGRAM,;  Surgeon: Leslie Balbuena MD;  Location: [facility] OR 1ST FLR;  Service: Urology;  Laterality: Bilateral;    ESOPHAGOGASTRODUODENOSCOPY  2014     ESOPHAGOGASTRODUODENOSCOPY  11/18/2020    ESOPHAGOGASTRODUODENOSCOPY N/A 11/18/2020    Procedure: ESOPHAGOGASTRODUODENOSCOPY (EGD);  Surgeon: Zenon Spencer MD;  Location: [facility] ENDO;  Service: Endoscopy;  Laterality: N/A;    ESOPHAGOGASTRODUODENOSCOPY N/A 12/11/2020    Procedure: EGD (ESOPHAGOGASTRODUODENOSCOPY);  Surgeon: Juancho Muse MD;  Location: [facility] ENDO (2ND FLR);  Service: Endoscopy;  Laterality: N/A;    HYSTERECTOMY  2014    [facility] for cervical cancer    ILEOSTOMY  9/17/2020    Procedure: CREATION, ILEOSTOMY;  Surgeon: Hammad Reynolds MD;  Location: [facility] OR 2ND FLR;  Service: Colon and Rectal;;    MOBILIZATION OF SPLENIC FLEXURE N/A 9/11/2020    Procedure: MOBILIZATION, COLONIC;  Surgeon: Hammad Reynolds MD;  Location: [facility] OR 2ND FLR;  Service: Colon and Rectal;  Laterality: N/A;    OOPHORECTOMY      RETROPERITONEAL LYMPHADENECTOMY Right 9/17/2018    Procedure: LYMPHADENECTOMY, RETROPERITONEUM;  Surgeon: Sebas Reed MD;  Location: [facility] OR 2ND FLR;  Service: General;  Laterality: Right;  ILIAC     Review of patient's allergies indicates:   Allergen Reactions    Bee sting [allergen ext-venom-honey bee]      Rash      Grass pollen-bermuda, standard      rash       Scheduled Medications:    apixaban  5 mg Oral BID    gabapentin  100 mg Oral QHS    metoprolol tartrate  12.5 mg Oral BID    pantoprazole  40 mg Oral BID       PRN Medications: acetaminophen, LORazepam, naloxone, ondansetron, promethazine (PHENERGAN) IVPB, sodium chloride 0.9%    Family History     Problem Relation (Age of Onset)    Breast cancer Maternal Aunt    Cancer Father, Mother    Cervical cancer Mother    Colon cancer Father    Drug abuse Daughter, Daughter    Esophageal cancer Father    Heart disease Sister, Maternal Grandmother    Suicide Daughter        Tobacco Use    Smoking status: Former Smoker    Smokeless tobacco: Never Used   Substance and Sexual Activity    Alcohol use: No     Alcohol/week: 0.0  standard drinks    Drug use: No    Sexual activity: Yes     Partners: Male     Birth control/protection: None     Comment:  19 years since 1999     Review of Systems   Constitutional: Positive for activity change. Negative for fatigue and fever.   HENT: Negative for trouble swallowing and voice change.    Eyes: Negative for photophobia and visual disturbance.   Respiratory: Negative for cough and shortness of breath.    Cardiovascular: Negative for chest pain and palpitations.   Gastrointestinal: Negative for abdominal pain, nausea and vomiting.   Genitourinary: Negative for difficulty urinating and flank pain.   Musculoskeletal: Positive for gait problem. Negative for arthralgias.   Skin: Negative for color change and rash.   Neurological: Positive for weakness. Negative for numbness.   Psychiatric/Behavioral: Negative for agitation and confusion.     Objective:     Vital Signs (Most Recent):  Temp: 99.2 °F (37.3 °C) (12/16/20 1151)  Pulse: 93 (12/16/20 1151)  Resp: 18 (12/16/20 1151)  BP: (!) 98/59 (12/16/20 1151)  SpO2: 98 % (12/16/20 1151)    Vital Signs (24h Range):  Temp:  [96.9 °F (36.1 °C)-99.2 °F (37.3 °C)] 99.2 °F (37.3 °C)  Pulse:  [62-98] 93  Resp:  [16-18] 18  SpO2:  [98 %-100 %] 98 %  BP: ()/(57-66) 98/59     Body mass index is 23.04 kg/m².    Physical Exam  Vitals signs reviewed.   Constitutional:       General: She is not in acute distress.     Appearance: She is well-developed. She is not ill-appearing.   HENT:      Head: Normocephalic and atraumatic.   Eyes:      General:         Right eye: No discharge.         Left eye: No discharge.   Neck:      Musculoskeletal: Neck supple.   Cardiovascular:      Pulses: Normal pulses.   Pulmonary:      Effort: Pulmonary effort is normal. No respiratory distress.   Abdominal:      Palpations: Abdomen is soft.      Comments: ileostomy in place   Genitourinary:     Comments: urostomy in place  Musculoskeletal:         General: No deformity.    Skin:     General: Skin is warm and dry.   Neurological:      Mental Status: She is alert and oriented to person, place, and time.      Cranial Nerves: Facial asymmetry present.      Comments: Follows commands  deconditioning   Psychiatric:         Behavior: Behavior normal.         Cognition and Memory: Cognition is not impaired.       Diagnostic Results:   Labs: Reviewed  X-Ray: Reviewed  CT: Reviewed    Assessment/Plan:     * Colostomy care  -CRS managing  -wound care following  -no leakage from ileostomy overnight on 12/15 per CRS    Acute deep vein thrombosis (DVT) of lower extremity  -on eliquis    FTT (failure to thrive) in adult  -on calorie count  -PT/OT following    Bilateral ureteral obstruction  -urostomy in place    Gastroesophageal reflux disease with esophagitis  -found on EGD    Impaired functional mobility, balance, gait, and endurance  See hospital course for functional status.      Recommendations  -  Encourage mobility, OOB in chair, and early ambulation as appropriate  -  PT/OT evaluate and treat  -  Pain management  -  DVT prophylaxis if appropriate   -  Monitor for and prevent skin breakdown and pressure ulcers  · Early mobility, repositioning/weight shifting every 20-30 minutes when sitting, turn patient every 2 hours, proper mattress/overlay and chair cushioning, pressure relief/heel protector boots  ·   Participating with therapy and progressing well. Patient requests HH and does not want to go to IRF.  Will follow progress for post acute care recommendation.      Thank you for your consult.     Rylee Phelps NP  Department of Physical Medicine & Rehab  [facility]

## 2020-12-16 NOTE — HPI
Per chart review, Edita Riley is a 55-year-old female with PMHx of stroke, cervical cancer s/p pelvic radiation and bilateral ureteral strictures and bilateral hydronephrosis R>L s/p percutaneous nephrostomy tubes, s/p open transverse colon conduit urinary diversion on 9/11/2020 with take back to OR 9/17 with colo-colic anastomosis intact, bowel perforation found, underwent resection of right colon, remaining transverses colon, and proximal descending colon, ileostomy creation, Charlotte's pouch created, anemia, moderate malnutrition. Transferred to [facility] on 10/13/20.    Presented to [facility] on 12/8 with nausea and emesis. Per CRS, no acute path on CT. GO for EGD with LA Grade B reflux esophagitis and gastric erosions without bleeding. Hospital course complicated by hypovolemia, Impaired functional mobility, balance, gait, and endurance, DVT LE (on eliquis), and leakage from ileostomy (wound care following).      Functional History: Prior to 09/2020 admission, Patient lives with spouse and daughter in a single  story home with 3 steps to enter.  Prior to admission, (I) with ADLs and mobility. DME: [facility]

## 2020-12-16 NOTE — TELEPHONE ENCOUNTER
----- Message from Joy Combs MA sent at 12/16/2020 10:20 AM CST -----  Contact: [phone]  Pt is needing a post op visit coordinated with an appt to see barry sanchez preferably on the same day. Pt has medicaid so I;m unable to book her appt w/ Dr Reynolds.  She has an appt on 12/31/with barry but may need to be changed in order to see both providers together.

## 2020-12-16 NOTE — PLAN OF CARE
[facility]    FACILITY TRANSFER ORDERS      Patient Name: Edita Riley  YOB: 1965    PCP: Primary Doctor No   PCP Address: None  PCP Phone Number: None  PCP Fax: None    Encounter Date: 12/16/2020    Admit to: Rehab    Vital Signs:  Routine    Diagnoses:   Active Hospital Problems    Diagnosis  POA    *Colostomy care [Z43.3]  Not Applicable    Short of breath on exertion [R06.02]  Yes    Tachycardia [R00.0]  Yes    Hypoxia [R09.02]  Yes    FTT (failure to thrive) in adult [R62.7]  Yes    Acute deep vein thrombosis (DVT) of lower extremity [I82.409]  Yes    Hypovolemia [E86.1]  Yes    Bilateral ureteral obstruction [N13.5]  Yes     Chronic    Gastroesophageal reflux disease with esophagitis [K21.00]  Yes     Chronic    Impaired functional mobility, balance, gait, and endurance [Z74.09]  Yes     Chronic    Essential hypertension [I10]  Yes     Chronic      Resolved Hospital Problems   No resolved problems to display.       Allergies:  Review of patient's allergies indicates:   Allergen Reactions    Bee sting [allergen ext-venom-honey bee]      Rash      Grass pollen-bermuda, standard      rash       Diet: regular with boost    Activities: up in chair for meals, ambulate with nursing several times a day    Nursing: accurate output from ileostomy and urostomy     Labs: CBC CMP weekly or per routine          CONSULTS:    Physical Therapy to evaluate and treat. , Occupational Therapy to evaluate and treat. and  to evaluate for community resources/long-range planning.    MISCELLANEOUS CARE:  Colostomy Care: Empty bag every shift and PRN. Change and clean site every 48 hours.     WOUND CARE ORDERS  None    Medications: Review discharge medications with patient and family and provide education.      Current Discharge Medication List      START taking these medications    Details   pantoprazole (PROTONIX) 40 MG tablet Take 1 tablet (40 mg total) by mouth 2  (two) times a day.  Qty: 60 tablet, Refills: 6         CONTINUE these medications which have NOT CHANGED    Details   apixaban (ELIQUIS) 5 mg Tab Take 1 tablet (5 mg total) by mouth 2 (two) times daily.  Qty: 60 tablet, Refills: 5      gabapentin (NEURONTIN) 100 MG capsule Take 1 capsule (100 mg total) by mouth every evening.  Qty: 30 capsule, Refills: 1      magnesium oxide (MAG-OX) 400 mg (241.3 mg magnesium) tablet Take 1 tablet (400 mg total) by mouth 2 (two) times daily.  Qty: 60 tablet, Refills: 1      metoprolol tartrate (LOPRESSOR) 25 MG tablet Take 0.5 tablets (12.5 mg total) by mouth 2 (two) times daily.  Qty: 30 tablet, Refills: 1    Comments: .                  _________________________________  Kerri Castillo NP  12/16/2020

## 2020-12-16 NOTE — PLAN OF CARE
12/16/20 1356   Post-Acute Status   Post-Acute Authorization Placement   Post-Acute Placement Status Set-up Complete   Pt accepted to [facility] and auth received.  Wheelchair transport set up via [facility] for now. Nurse can call report to [phone]. Nurse notified of the above.    Gissel Grimaldo LMSW  [facility]  [phone]

## 2020-12-16 NOTE — PLAN OF CARE
POC reviewed with pt. Pt verbalized understanding. AAOX'4. VSS on RA. Pt on low fiber/residue diet tolerating well. No c/o pain,nausea ,or vomiting. Pt showed no interest in care of ostomy and urostomy. Ostomy and urostomy CDI. No acute events. Bed in lowest position with call light within reach.WCTM.

## 2020-12-16 NOTE — PROGRESS NOTES
Ostomy care follow-up  No leaking from either pouch, patient has been emptying pouches with minimal assistance. Reviewed care of stoma/pouching- verbalized understanding.   Supplies at besides, samples ordered-- Titus Ileostomy pouch = #[id number]- with belt, Urotomy pouch=# [id number]      Awaiting d/c today.   ANH Peterson RN, CWCN  [id number]

## 2020-12-16 NOTE — SUBJECTIVE & OBJECTIVE
Past Medical History:   Diagnosis Date    Abnormal mammogram 8/25/2020    Anxiety     Cervical cancer 2014    Chronic back pain     Depression     Diarrhea due to malabsorption 11/14/2018    Fibromyalgia     Generalized abdominal pain 8/25/2020    GERD (gastroesophageal reflux disease)     Hiatal hernia 2014    History of cervical cancer 10/11/2018    History of cervical cancer 10/11/2018    Hx of psychiatric care     Cymbalta, trazodone    Hypertension     Hypomagnesemia 11/21/2018    Lactose intolerance     Metastatic squamous cell carcinoma to lymph node 10/11/2018    Nephrostomy tube displaced     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 drug 11/14/2018    Osteoarthritis of back     Psychiatric problem     Stroke 1972     Past Surgical History:   Procedure Laterality Date    ANTEGRADE NEPHROSTOGRAPHY Bilateral 9/28/2020    Procedure: Nephrostogram - antegrade;  Surgeon: Leslie Balbuena MD;  Location: [facility] OR 1ST [facility];  Service: Urology;  Laterality: Bilateral;    BILATERAL OOPHORECTOMY  2015    CHOLECYSTECTOMY  11/09/2016    Done at [facility], path showed chronic cholecystitis and gallstones    cold knife conization  2014    COLECTOMY, RIGHT  9/17/2020    Procedure: COLECTOMY, RIGHT Extended;  Surgeon: Hammad Reynolds MD;  Location: [facility] OR 2ND FLR;  Service: Colon and Rectal;;    COLONOSCOPY  2014    COLONOSCOPY N/A 10/24/2016    at [facility], Dr Gutiérrez    COLONOSCOPY N/A 5/18/2018    Procedure: COLONOSCOPY;  Surgeon: Arden Gutiérrez MD;  Location: [facility] (4TH FLR);  Service: Endoscopy;  Laterality: N/A;    COLONOSCOPY N/A 7/28/2020    Procedure: COLONOSCOPY;  Surgeon: Hammad Reynolds MD;  Location: [facility] (4TH FLR);  Service: Colon and Rectal;  Laterality: N/A;  covid test [place] 7/25    CYSTOSCOPY WITH URETEROSCOPY, RETROGRADE PYELOGRAPHY, AND INSERTION OF STENT Bilateral 3/21/2020    Procedure: CYSTOSCOPY, WITH RETROGRADE PYELOGRAM,;  Surgeon: Leslie Balbuena MD;  Location:  [facility] OR 1ST FLR;  Service: Urology;  Laterality: Bilateral;    ESOPHAGOGASTRODUODENOSCOPY  2014    ESOPHAGOGASTRODUODENOSCOPY  11/18/2020    ESOPHAGOGASTRODUODENOSCOPY N/A 11/18/2020    Procedure: ESOPHAGOGASTRODUODENOSCOPY (EGD);  Surgeon: Zenon Spencer MD;  Location: [facility] ENDO;  Service: Endoscopy;  Laterality: N/A;    ESOPHAGOGASTRODUODENOSCOPY N/A 12/11/2020    Procedure: EGD (ESOPHAGOGASTRODUODENOSCOPY);  Surgeon: Juancho Muse MD;  Location: [facility] ENDO (2ND FLR);  Service: Endoscopy;  Laterality: N/A;    HYSTERECTOMY  2014    [facility] for cervical cancer    ILEOSTOMY  9/17/2020    Procedure: CREATION, ILEOSTOMY;  Surgeon: Hammad Reynolds MD;  Location: [facility] OR 2ND FLR;  Service: Colon and Rectal;;    MOBILIZATION OF SPLENIC FLEXURE N/A 9/11/2020    Procedure: MOBILIZATION, COLONIC;  Surgeon: Hammad Reynolds MD;  Location: [facility] OR 2ND FLR;  Service: Colon and Rectal;  Laterality: N/A;    OOPHORECTOMY      RETROPERITONEAL LYMPHADENECTOMY Right 9/17/2018    Procedure: LYMPHADENECTOMY, RETROPERITONEUM;  Surgeon: Sebas Reed MD;  Location: [facility] OR 2ND FLR;  Service: General;  Laterality: Right;  ILIAC     Review of patient's allergies indicates:   Allergen Reactions    Bee sting [allergen ext-venom-honey bee]      Rash      Grass pollen-bermuda, standard      rash       Scheduled Medications:    apixaban  5 mg Oral BID    gabapentin  100 mg Oral QHS    metoprolol tartrate  12.5 mg Oral BID    pantoprazole  40 mg Oral BID       PRN Medications: acetaminophen, LORazepam, naloxone, ondansetron, promethazine (PHENERGAN) IVPB, sodium chloride 0.9%    Family History     Problem Relation (Age of Onset)    Breast cancer Maternal Aunt    Cancer Father, Mother    Cervical cancer Mother    Colon cancer Father    Drug abuse Daughter, Daughter    Esophageal cancer Father    Heart disease Sister, Maternal Grandmother    Suicide Daughter        Tobacco Use    Smoking status: Former Smoker    Smokeless  tobacco: Never Used   Substance and Sexual Activity    Alcohol use: No     Alcohol/week: 0.0 standard drinks    Drug use: No    Sexual activity: Yes     Partners: Male     Birth control/protection: None     Comment:  19 years since 1999     Review of Systems   Constitutional: Positive for activity change. Negative for fatigue and fever.   HENT: Negative for trouble swallowing and voice change.    Eyes: Negative for photophobia and visual disturbance.   Respiratory: Negative for cough and shortness of breath.    Cardiovascular: Negative for chest pain and palpitations.   Gastrointestinal: Negative for abdominal pain, nausea and vomiting.   Genitourinary: Negative for difficulty urinating and flank pain.   Musculoskeletal: Positive for gait problem. Negative for arthralgias.   Skin: Negative for color change and rash.   Neurological: Positive for weakness. Negative for numbness.   Psychiatric/Behavioral: Negative for agitation and confusion.     Objective:     Vital Signs (Most Recent):  Temp: 99.2 °F (37.3 °C) (12/16/20 1151)  Pulse: 93 (12/16/20 1151)  Resp: 18 (12/16/20 1151)  BP: (!) 98/59 (12/16/20 1151)  SpO2: 98 % (12/16/20 1151)    Vital Signs (24h Range):  Temp:  [96.9 °F (36.1 °C)-99.2 °F (37.3 °C)] 99.2 °F (37.3 °C)  Pulse:  [62-98] 93  Resp:  [16-18] 18  SpO2:  [98 %-100 %] 98 %  BP: ()/(57-66) 98/59     Body mass index is 23.04 kg/m².    Physical Exam  Vitals signs reviewed.   Constitutional:       General: She is not in acute distress.     Appearance: She is well-developed. She is not ill-appearing.   HENT:      Head: Normocephalic and atraumatic.   Eyes:      General:         Right eye: No discharge.         Left eye: No discharge.   Neck:      Musculoskeletal: Neck supple.   Cardiovascular:      Pulses: Normal pulses.   Pulmonary:      Effort: Pulmonary effort is normal. No respiratory distress.   Abdominal:      Palpations: Abdomen is soft.      Comments: ileostomy in place    Genitourinary:     Comments: urostomy in place  Musculoskeletal:         General: No deformity.   Skin:     General: Skin is warm and dry.   Neurological:      Mental Status: She is alert and oriented to person, place, and time.      Cranial Nerves: Facial asymmetry present.      Comments: Follows commands  deconditioning   Psychiatric:         Behavior: Behavior normal.         Cognition and Memory: Cognition is not impaired.       NEUROLOGICAL EXAMINATION:     MENTAL STATUS   Oriented to person, place, and time.       Diagnostic Results:   Labs: Reviewed  X-Ray: Reviewed  CT: Reviewed

## 2020-12-16 NOTE — CONSULTS
Inpatient consult to Physical Medicine Rehab  Consult performed by: TONI Valdes  Consult ordered by: Hammad Reynolds MD  Reason for consult: rehab evaluation      Consult received.  Reviewed patient history and current admission.  PM&R following.    KATIUSKA Valdes, TONI-C  Physical Medicine & Rehabilitation   12/16/2020

## 2020-12-16 NOTE — HOSPITAL COURSE
12/15:Sit to Stand SBA with rolling walker. Gait-220 ft with RW and SBA to CGA.  Pt ascended/descended 4 stair(s) with No Assistive Device with left handrail with Contact Guard Assistance.

## 2020-12-16 NOTE — PROGRESS NOTES
Ostomy care follow-up  The patient was able to cleanse around the stoma, apply barrier spray to the socorro-stomal skin, needs assistance applying paste to cut area on pouch then applied pouch per self and warmth to ensure seal.  She needed minimal assistance.   The  Ileostomy pouch has a belt which she is not used to connecting.  She needs reminders to empty the pouch but is able to perform that task per self.    Both the ileo and uro pouches were changed.   The stomas are 23 mm, at skin level, the socorro-stomal skin is improved.    Ostomy care will follow-up in am.   BASSAM. Luanne Peterson RN, CWCN  [id number]

## 2020-12-16 NOTE — PLAN OF CARE
Patient is being discharged to [facility]. Transportation was set up via [employer].     12/16/20 1517   Final Note   Assessment Type Final Discharge Note   Anticipated Discharge Disposition Rehab   Discharge plans and expectations educations in teach back method with documentation complete? Yes   Right Care Referral Info   Post Acute Recommendation IRF   Facility Name [facility]   Post-Acute Status   Post-Acute Authorization Placement   Post-Acute Placement Status Set-up Complete     Future Appointments   Date Time Provider Department Center   12/28/2020  3:20 PM Leslie Balbuena MD [facility] UROLOGY [facility]   12/31/2020  8:45 AM RHINA Kim [facility] ENTERO [facility]   12/31/2020 10:00 AM Refugio Lemon MD [facility] GYN ONC [facility]   1/4/2021  1:00 PM Hammad Reynolds MD [facility] COLON [facility]   1/4/2021  1:45 PM RHINA Kim [facility] ENTERO [facility]     Eladia Staples RN, CM   Ext: [phone]

## 2020-12-17 NOTE — HPI
History of Present Illness: 55F with PMH cervical cancer s/p radiation with resulting BL ureteral strictures s/p BL nephrostomy tube placement. On 9/11/20 she underwent creation of a transverse colon urinary conduit (Dr. Reynolds, Dr. Balbuena). Her post-operative course was complicated by a bowel perforation s/p extended right hemicolectomy and ileostomy creation (9/17/20). Subsequently she required IR drainage of a pelvic collection (9/23/20) and longterm IV antibiotics/antifungals. She was discharged from the hospital to an LTAC on 10/13/20. In the interval since her discharge she was started on therapeutic anticoagulation for LE DVT. She also underwent an EGD for hyperemesis and was found to have a mild Schatzki ring requiring dilation. She has completed all of her antibiotic/antifungals as of 12/1. She was discharged to home on Friday 12/4. Since then she has had worsening nausea and emesis with inability to tolerate PO. She also has significant skin irritation around both her ieostomy and her urostomy and difficulty pouching the stomas so that they do not leak. She denies any significant abdominal pain or distention. Her ostomy is still functioning with normal output. She denies fever but notes chills. She denies CP, SOB.

## 2020-12-17 NOTE — HOSPITAL COURSE
"Pt admitted from Er with FTT and skin irritation  Says her  was not helping her and she could not get the appliance to stick to her skin due to lack of mobility with hands.  After a few days she complained of feeling food getting stuck in her chest, consult GI and egd was to be done but while in scope lab she suffered a "panic attack", sent to ICU with sob and difficulty breathing, pulse ox 100%, CT done for poss PE due to hx of DVT, negative, eliquis resumed.  She was quickly transferred back to floor and EGD was done, negative results, PT/OT continued to follow her for mobilization.  Wound care worked with pt along with nursing but pt showed very little interest in caring for her ostomy (ileostomy and urostomy).   was called and he showed very little interest in learning but eventually agreed to come to hospital for lesson, per ostomy nurse he showed very little interest in caring for her.  Rehab was consulted and she was accepted and transferred to rehab in good condition       "

## 2020-12-17 NOTE — DISCHARGE SUMMARY
[facility]  Colorectal Surgery  Discharge Summary      Patient Name: Edita Riley  MRN: [id number]  Admission Date: 12/8/2020  Hospital Length of Stay: 8 days  Discharge Date and Time: 12/16/2020  3:41 PM  Attending Physician: Delma att. providers found   Discharging Provider: Kerri Castillo NP  Primary Care Provider: Primary Doctor Delma     HPI:  History of Present Illness: 55F with PMH cervical cancer s/p radiation with resulting BL ureteral strictures s/p BL nephrostomy tube placement. On 9/11/20 she underwent creation of a transverse colon urinary conduit (Dr. Reynolds, Dr. Balbuena). Her post-operative course was complicated by a bowel perforation s/p extended right hemicolectomy and ileostomy creation (9/17/20). Subsequently she required IR drainage of a pelvic collection (9/23/20) and longterm IV antibiotics/antifungals. She was discharged from the hospital to an LTAC on 10/13/20. In the interval since her discharge she was started on therapeutic anticoagulation for LE DVT. She also underwent an EGD for hyperemesis and was found to have a mild Schatzki ring requiring dilation. She has completed all of her antibiotic/antifungals as of 12/1. She was discharged to home on Friday 12/4. Since then she has had worsening nausea and emesis with inability to tolerate PO. She also has significant skin irritation around both her ieostomy and her urostomy and difficulty pouching the stomas so that they do not leak. She denies any significant abdominal pain or distention. Her ostomy is still functioning with normal output. She denies fever but notes chills. She denies CP, SOB.    Procedure(s) (LRB):  EGD (ESOPHAGOGASTRODUODENOSCOPY) (N/A)     Hospital Course:  Pt admitted from Er with FTT and skin irritation  Says her  was not helping her and she could not get the appliance to stick to her skin due to lack of mobility with hands.  After a few days she complained of feeling food getting stuck  "in her chest, consult GI and egd was to be done but while in scope lab she suffered a "panic attack", sent to ICU with sob and difficulty breathing, pulse ox 100%, CT done for poss PE due to hx of DVT, negative, eliquis resumed.  She was quickly transferred back to floor and EGD was done, negative results, PT/OT continued to follow her for mobilization.  Wound care worked with pt along with nursing but pt showed very little interest in caring for her ostomy (ileostomy and urostomy).   was called and he showed very little interest in learning but eventually agreed to come to hospital for lesson, per ostomy nurse he showed very little interest in caring for her.  Rehab was consulted and she was accepted and transferred to rehab in good condition         Consults (From admission, onward)        Status Ordering Provider     Inpatient consult to Colorectal Surgery  Once     Provider:  (Not yet assigned)    Completed SCOTT TRONCOSO     Inpatient consult to Gastroenterology  Once     Provider:  (Not yet assigned)    Completed MAKENNA GRIGGS     Inpatient consult to Physical Medicine Rehab  Once     Provider:  (Not yet assigned)    Completed INDRA NATARAJAN     Inpatient consult to Urology  Once     Provider:  (Not yet assigned)    Completed MAKENNA GRIGGS          Significant Diagnostic Studies: Labs: BMP: No results for input(s): GLU, NA, K, CL, CO2, BUN, CREATININE, CALCIUM, MG in the last 48 hours. and CBC No results for input(s): WBC, HGB, HCT, PLT in the last 48 hours.    Pending Diagnostic Studies:     None        Final Active Diagnoses:    Diagnosis Date Noted POA    PRINCIPAL PROBLEM:  Colostomy care [Z43.3]  Not Applicable    Short of breath on exertion [R06.02]  Yes    Tachycardia [R00.0]  Yes    Hypoxia [R09.02]  Yes    FTT (failure to thrive) in adult [R62.7] 12/09/2020 Yes    Acute deep vein thrombosis (DVT) of lower extremity [I82.409] 12/09/2020 Yes    Hypovolemia [E86.1] 12/08/2020 " Yes    Bilateral ureteral obstruction [N13.5] 09/11/2020 Yes     Chronic    Gastroesophageal reflux disease with esophagitis [K21.00] 11/16/2017 Yes     Chronic    Impaired functional mobility, balance, gait, and endurance [Z74.09] 07/24/2015 Yes     Chronic    Essential hypertension [I10] 05/04/2015 Yes     Chronic      Problems Resolved During this Admission:      Discharged Condition: good    Disposition: Rehab Facility    Follow Up:  Follow-up Information     Hammad Reynolds MD In 2 weeks.    Specialty: Colon and Rectal Surgery  Contact information:  [address] [facility] [address]  [phone]             Leslie Balbuena MD In 2 weeks.    Specialty: Urology  Contact information:  [address]  [place] [id number] [phone]                 Patient Instructions:      Notify your health care provider if you experience any of the following:  temperature >100.4     Notify your health care provider if you experience any of the following:  persistent nausea and vomiting or diarrhea     Notify your health care provider if you experience any of the following:  severe uncontrolled pain     Notify your health care provider if you experience any of the following:  redness, tenderness, or signs of infection (pain, swelling, redness, odor or green/yellow discharge around incision site)     Notify your health care provider if you experience any of the following:  difficulty breathing or increased cough     Notify your health care provider if you experience any of the following:  severe persistent headache     Notify your health care provider if you experience any of the following:  worsening rash     Notify your health care provider if you experience any of the following:  persistent dizziness, light-headedness, or visual disturbances     Notify your health care provider if you experience any of the following:  increased confusion or weakness     Medications:  Reconciled Home Medications:       Medication List      START taking these medications    pantoprazole 40 MG tablet  Commonly known as: PROTONIX  Take 1 tablet (40 mg total) by mouth 2 (two) times a day.        CONTINUE taking these medications    apixaban 5 mg Tab  Commonly known as: ELIQUIS  Take 1 tablet (5 mg total) by mouth 2 (two) times daily.     gabapentin 100 MG capsule  Commonly known as: NEURONTIN  Take 1 capsule (100 mg total) by mouth every evening.     magnesium oxide 400 mg (241.3 mg magnesium) tablet  Commonly known as: MAG-OX  Take 1 tablet (400 mg total) by mouth 2 (two) times daily.     metoprolol tartrate 25 MG tablet  Commonly known as: LOPRESSOR  Take 0.5 tablets (12.5 mg total) by mouth 2 (two) times daily.            Kerri Castillo NP  Colorectal Surgery  [facility]

## 2020-12-17 NOTE — ASSESSMENT & PLAN NOTE
[facility] 55 [facility] female with history of cervical cancer s/p radiation with resulting BL ureteral strictures s/p BL nephrostomy tube placement. On 9/11/20 she underwent creation of a transverse colon urinary conduit (Dr. Reynolds, Dr. Balbuena). Her post-operative course was complicated by a bowel perforation s/p extended right hemicolectomy and ileostomy creation (9/17/20). Subsequently she required IR drainage of a pelvic collection (9/23/20) and longterm IV antibiotics/antifungals. She was discharged from the hospital to an LTAC on 10/13/20, was in LTC for almost 60 days, home for 4 days, family having difficulty caring for pt, not eating at home and difficulty keeping appliance over ileostomy    Consult wound care  PT/OT  Calorie count    Per wound care having difficulty getting family to be available for wound care, pt still having issues with leakage from ostomy, will try convex and belt today  Social service to talke to pt about dc plans,  very resistant to taking her home, social service working with family

## 2020-12-17 NOTE — ASSESSMENT & PLAN NOTE
Chronic, controlled.  Latest blood pressure and vitals reviewed-    .   Home meds for hypertension were reviewed and noted below. Hospital anti-hypertensive changes were made as shown below.  Hypertension Medications             metoprolol tartrate (LOPRESSOR) 25 MG tablet Take 0.5 tablets (12.5 mg total) by mouth 2 (two) times daily.        Will utilize p.r.n. blood pressure medication only if patient's blood pressure greater than  180/110 and she develops symptoms such as worsening chest pain or shortness of breath.

## 2020-12-22 ENCOUNTER — HOSPITAL ENCOUNTER (OUTPATIENT)
Dept: RADIOLOGY | Facility: HOSPITAL | Age: 55
Discharge: HOME | End: 2020-12-22
Attending: NURSE PRACTITIONER
Payer: MEDICAID

## 2020-12-22 PROCEDURE — 76700 US EXAM ABDOM COMPLETE: CPT | Mod: 26,GC,, | Performed by: RADIOLOGY

## 2020-12-22 PROCEDURE — 76700 US ABDOMEN COMPLETE: ICD-10-PCS | Mod: 26,GC,, | Performed by: RADIOLOGY

## 2020-12-22 PROCEDURE — 74018 RADEX ABDOMEN 1 VIEW: CPT | Mod: 26,,, | Performed by: RADIOLOGY

## 2020-12-22 PROCEDURE — 74018 XR KUB: ICD-10-PCS | Mod: 26,,, | Performed by: RADIOLOGY

## 2020-12-31 ENCOUNTER — OFFICE VISIT (OUTPATIENT)
Dept: GYNECOLOGIC ONCOLOGY | Facility: CLINIC | Age: 55
End: 2020-12-31
Payer: MEDICAID

## 2020-12-31 VITALS
BODY MASS INDEX: 21.12 KG/M2 | DIASTOLIC BLOOD PRESSURE: 60 MMHG | SYSTOLIC BLOOD PRESSURE: 94 MMHG | WEIGHT: 143 LBS | HEART RATE: 123 BPM

## 2020-12-31 DIAGNOSIS — T45.1X5A NEUROPATHY DUE TO CHEMOTHERAPEUTIC DRUG: ICD-10-CM

## 2020-12-31 DIAGNOSIS — G62.0 NEUROPATHY DUE TO CHEMOTHERAPEUTIC DRUG: ICD-10-CM

## 2020-12-31 DIAGNOSIS — C77.9 METASTATIC SQUAMOUS CELL CARCINOMA TO LYMPH NODE: Primary | ICD-10-CM

## 2020-12-31 PROCEDURE — 99214 PR OFFICE/OUTPT VISIT, EST, LEVL IV, 30-39 MIN: ICD-10-PCS | Mod: S$PBB,,, | Performed by: OBSTETRICS & GYNECOLOGY

## 2020-12-31 PROCEDURE — 99999 PR PBB SHADOW E&M-EST. PATIENT-LVL III: CPT | Mod: PBBFAC,,, | Performed by: OBSTETRICS & GYNECOLOGY

## 2020-12-31 PROCEDURE — 99213 OFFICE O/P EST LOW 20 MIN: CPT | Mod: PBBFAC | Performed by: OBSTETRICS & GYNECOLOGY

## 2020-12-31 PROCEDURE — 99214 OFFICE O/P EST MOD 30 MIN: CPT | Mod: S$PBB,,, | Performed by: OBSTETRICS & GYNECOLOGY

## 2020-12-31 PROCEDURE — 99999 PR PBB SHADOW E&M-EST. PATIENT-LVL III: ICD-10-PCS | Mod: PBBFAC,,, | Performed by: OBSTETRICS & GYNECOLOGY

## 2020-12-31 RX ORDER — CIPROFLOXACIN 500 MG/1
500 TABLET ORAL EVERY 12 HOURS
Status: ON HOLD | COMMUNITY
Start: 2020-12-29 | End: 2021-01-05 | Stop reason: HOSPADM

## 2020-12-31 RX ORDER — PANTOPRAZOLE SODIUM 40 MG/1
40 TABLET, DELAYED RELEASE ORAL
Status: ON HOLD | COMMUNITY
Start: 2020-12-30 | End: 2021-01-04

## 2020-12-31 RX ORDER — METOCLOPRAMIDE 5 MG/1
5 TABLET ORAL
COMMUNITY
Start: 2020-12-29 | End: 2021-01-06

## 2020-12-31 NOTE — PROGRESS NOTES
Subjective:       Patient ID: Edita Riley is a 55 y.o. female.    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 S/P chemoRT.      Denies vaginal bleeding.     2020: urinary conduit with transverse colon with post op complications of bowel perforation requiring right colectomy and ileostomy. Further complications of DVT and pelvic abscess and need for long term IV Abs. Discharged from [facility] in Dec 30,2020. On Eliquis.     Having nose bleeds. Small amount.     Fatigue but is feeling better.             Pap:  Aug 25, 2020: Normal  MM2020: normal. Tyrer-Cuzick: 20.91 %  CBE: Aug 25, 2020  Colonoscopy: 2020 (repeat in 5 yrs)     Her oncologic history is:  Referring physician:  Dr. Sebas Reed  Reason for referral:  Incisional biopsy of retroperitoneal periaortic node that shows squamous cell carcinoma consistent with gynecologic primary        Aug 2018: chronic abdominal pain referred for consideration of biopsy of a 2 cm right common iliac artery bifurcation node  Node was evident on scan in  and again in  - no change  Patient's symptoms are non specific, diffuse, have not resulted in weight loss  Patient already carries a diagnosis of diffuse pain syndromes including fibromyalgia        Aug 2018: PET scan Right common iliac lymph node suspicious for malignancy.  This could be benign or malignant lymphoproliferative disorder metastatic disease.  This is an a risky position for percutaneous biopsy.      2018: Underwent retroperitoneal approach for incisional biopsy of right common iliac LN. Pathology showed:  Supplemental Diagnosis  METASTATIC HIGH-GRADE CARCINOMA GROWING WITH SQUAMOUS CELL FEATURES. THE RESULTS OF  ADDITIONAL IMMUNOSTAINS ARE AS FOLLOWS: CK5/6, P63 AND CK7 ARE POSITIVE. TTF, S100 AND  CK20 ARE ALL NEGATIVE. THE CONTROLS STAIN APPROPRIATELY.  NOTE: GIVEN THESE RESULTS  "THIS MAY REPRESENT A PRIMARY IN THE URINARY OR LOWER GYN  TRACTS. DR. JAMESON HAS ALSO REVIEWED THIS CASE AND CONCURS WITH THE DIAGNOSIS.        She has a history of hysterectomy for cervical cancer in 2015 in [place], [place]. Hyst was for abnormal pap. Incidental finding of cervical cancer on the hyst specimen. She does not know any other details.      Nov 2018: started concurrent chemoradiation.      Dec 2018: admitted with hypokalemia, hypomagnesemia and hypocalcemia. Seizure like activity. Worsening depression. Electrolyte replacement. Started on Abilify by psychiatry. S/p 4 cycles of cisplatin.      Jan 2019: completed "WPRT and 4 cycles of cisplatin. Received only 4 due to electrolyte abnormalities.      Feb 2019:PET: Right common iliac lymph node, shows max SUV of 5.6 on today's exam versus 10.2 on prior exam.  A subcentimeter lymph node is seen in CT in this region.    The previously seen avid left axillary lymph node is no longer visualized is FDG avid.         May 2019: CT CAP: no evidence for disease. (CT done for insurance reason).  Previous imaging was a PET scan     Nov 2019: CT AP: STEVEN     March 2020: Recent ED visit for flank pain. Found to have right hydronephrosis. 3/21/2020: Cystoscopy with Dr. Balbuena. Bilateral ureteral stents placed. Throughout the bladder base there were areas of heaped up tissue concerning for bladder tumor versus radiation cystitis.  The areas lateral to each UO were resected and tissue was sent for pathology. Pathology showed:Urothelial mucosa with marked acute and chronic inflammation including numerous eosinophils, vascular  constriction and necrosis of stromal tissue, and urothelial hyperplasia with cytologic atypia suggestive of  radiation cystitis.  No definitive malignancy is identified.     April 2020:  CT AP: Bilateral double-J ureteral catheters with mild bilateral hydronephrosis.  Nonspecific soft tissue thickening at the base of the bladder right greater left.  Had " bilateral nephrostomy tubes placed at [facility]. [facility].      May 2020:  ED visit for bilateral lower extremity swelling up to the level of the calf.  Bilateral nephrostomy tubes.  UTI.  Urine cultures showed IJEOMA LUSITANIAE      July 2020: CT C/A/P with increased L sided hydronephrosis    Sept 2020: urinary conduit with transverse colon with post op complications of bowel perforation requiring right colectomy and ileostomy. Further complications of DVT and pelvic abscess and need for long term IV Abs. Discharged from LT[facility] in Dec 2020.           Review of Systems   Constitutional: Positive for appetite change (improved) and fatigue. Negative for chills and fever.        Cold feeling      Respiratory: Negative for cough, shortness of breath and wheezing.    Cardiovascular: Negative for chest pain, palpitations and leg swelling.   Gastrointestinal: Negative for abdominal pain, constipation, diarrhea, nausea and vomiting.   Genitourinary: Negative for difficulty urinating, dysuria, frequency, genital sores, hematuria, urgency, vaginal bleeding, vaginal discharge and vaginal pain.   Neurological: Positive for weakness. Numbness: bilateral feet.    Hematological: Negative for adenopathy. Does not bruise/bleed easily.   Psychiatric/Behavioral: The patient is not nervous/anxious.        Objective:   BP 94/60   Pulse (!) 123   Wt 64.9 kg (143 lb)   LMP 06/08/2014 (Approximate)   BMI 21.12 kg/m²      Physical Exam  Constitutional:       Appearance: She is well-developed.   HENT:      Head: Normocephalic and atraumatic.   Eyes:      General: No scleral icterus.  Neck:      Musculoskeletal: Neck supple.      Thyroid: No thyroid mass or thyromegaly.      Trachea: No tracheal deviation.   Cardiovascular:      Rate and Rhythm: Normal rate and regular rhythm.   Pulmonary:      Effort: Pulmonary effort is normal.      Breath sounds: Normal breath sounds. No wheezing.   Abdominal:      General: There is no  distension.      Palpations: Abdomen is soft. There is no mass.      Tenderness: There is no abdominal tenderness. There is no guarding or rebound.   Genitourinary:     Comments: Bimanual exam:  Vulva: no lesions. Normal appearance  Urethra: Normal size and location. No lesions  Bladder: No masses or tenderness.  Vagina: normal mucosa. No lesion  Cervix: absent.   Uterus: absent.  Adnexa: no masses.  Rectovaginal: Not performed      Difficult exam. Did not relax well.   Musculoskeletal:         General: No tenderness.   Lymphadenopathy:      Cervical: No cervical adenopathy.      Upper Body:      Right upper body: No supraclavicular adenopathy.      Left upper body: No supraclavicular adenopathy.   Skin:     General: Skin is warm and dry.   Neurological:      Mental Status: She is alert and oriented to person, place, and time.   Psychiatric:         Behavior: Behavior normal.         Thought Content: Thought content normal.         Judgment: Judgment normal.         Assessment:       1. 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 drug  Stable

## 2021-01-03 ENCOUNTER — HOSPITAL ENCOUNTER (INPATIENT)
Facility: HOSPITAL | Age: 56
LOS: 2 days | Discharge: HOME | DRG: 683 | End: 2021-01-05
Attending: EMERGENCY MEDICINE | Admitting: INTERNAL MEDICINE
Payer: MEDICAID

## 2021-01-03 DIAGNOSIS — F41.1 GENERALIZED ANXIETY DISORDER: ICD-10-CM

## 2021-01-03 DIAGNOSIS — F43.10 PTSD (POST-TRAUMATIC STRESS DISORDER): ICD-10-CM

## 2021-01-03 DIAGNOSIS — I82.513 CHRONIC DEEP VEIN THROMBOSIS (DVT) OF FEMORAL VEIN OF BOTH LOWER EXTREMITIES: ICD-10-CM

## 2021-01-03 DIAGNOSIS — I82.4Y3 ACUTE DEEP VEIN THROMBOSIS (DVT) OF PROXIMAL VEIN OF BOTH LOWER EXTREMITIES: ICD-10-CM

## 2021-01-03 DIAGNOSIS — R29.898 WEAKNESS OF BOTH LOWER EXTREMITIES: ICD-10-CM

## 2021-01-03 DIAGNOSIS — E55.9 VITAMIN D DEFICIENCY: ICD-10-CM

## 2021-01-03 DIAGNOSIS — E44.0 MODERATE MALNUTRITION: ICD-10-CM

## 2021-01-03 DIAGNOSIS — R07.9 CHEST PAIN: ICD-10-CM

## 2021-01-03 DIAGNOSIS — Z43.3 COLOSTOMY CARE: ICD-10-CM

## 2021-01-03 DIAGNOSIS — N13.30 HYDRONEPHROSIS, UNSPECIFIED HYDRONEPHROSIS TYPE: ICD-10-CM

## 2021-01-03 DIAGNOSIS — E83.52 HYPERCALCEMIA: Primary | ICD-10-CM

## 2021-01-03 DIAGNOSIS — G62.0 NEUROPATHY DUE TO CHEMOTHERAPEUTIC DRUG: ICD-10-CM

## 2021-01-03 DIAGNOSIS — I10 ESSENTIAL HYPERTENSION: Chronic | ICD-10-CM

## 2021-01-03 DIAGNOSIS — N17.9 AKI (ACUTE KIDNEY INJURY): ICD-10-CM

## 2021-01-03 DIAGNOSIS — R11.2 NAUSEA AND VOMITING, INTRACTABILITY OF VOMITING NOT SPECIFIED, UNSPECIFIED VOMITING TYPE: ICD-10-CM

## 2021-01-03 DIAGNOSIS — T45.1X5A NEUROPATHY DUE TO CHEMOTHERAPEUTIC DRUG: ICD-10-CM

## 2021-01-03 DIAGNOSIS — Z93.2 ILEOSTOMY IN PLACE: ICD-10-CM

## 2021-01-03 DIAGNOSIS — R11.0 NAUSEA: ICD-10-CM

## 2021-01-03 DIAGNOSIS — R11.2 NON-INTRACTABLE VOMITING WITH NAUSEA, UNSPECIFIED VOMITING TYPE: ICD-10-CM

## 2021-01-03 LAB
ALBUMIN SERPL BCP-MCNC: 4.1 G/DL (ref 3.5–5.2)
ALP SERPL-CCNC: 190 U/L (ref 55–135)
ALT SERPL W/O P-5'-P-CCNC: 61 U/L (ref 10–44)
ANION GAP SERPL CALC-SCNC: 15 MMOL/L (ref 8–16)
AST SERPL-CCNC: 42 U/L (ref 10–40)
BASOPHILS # BLD AUTO: 0.04 K/UL (ref 0–0.2)
BASOPHILS NFR BLD: 0.5 % (ref 0–1.9)
BILIRUB SERPL-MCNC: 0.3 MG/DL (ref 0.1–1)
BUN SERPL-MCNC: 19 MG/DL (ref 6–20)
CALCIUM SERPL-MCNC: 12.2 MG/DL (ref 8.7–10.5)
CHLORIDE SERPL-SCNC: 111 MMOL/L (ref 95–110)
CO2 SERPL-SCNC: 14 MMOL/L (ref 23–29)
CREAT SERPL-MCNC: 1.6 MG/DL (ref 0.5–1.4)
CTP QC/QA: YES
DIFFERENTIAL METHOD: ABNORMAL
EOSINOPHIL # BLD AUTO: 0 K/UL (ref 0–0.5)
EOSINOPHIL NFR BLD: 0.5 % (ref 0–8)
ERYTHROCYTE [DISTWIDTH] IN BLOOD BY AUTOMATED COUNT: 18.3 % (ref 11.5–14.5)
EST. GFR  (AFRICAN AMERICAN): 40.9 ML/MIN/1.73 M^2
EST. GFR  (NON AFRICAN AMERICAN): 35.5 ML/MIN/1.73 M^2
GLUCOSE SERPL-MCNC: 143 MG/DL (ref 70–110)
HCT VFR BLD AUTO: 46.4 % (ref 37–48.5)
HGB BLD-MCNC: 14.3 G/DL (ref 12–16)
IMM GRANULOCYTES # BLD AUTO: 0.03 K/UL (ref 0–0.04)
IMM GRANULOCYTES NFR BLD AUTO: 0.4 % (ref 0–0.5)
LACTATE SERPL-SCNC: 1.7 MMOL/L (ref 0.5–2.2)
LACTATE SERPL-SCNC: 2.5 MMOL/L (ref 0.5–2.2)
LIPASE SERPL-CCNC: 23 U/L (ref 4–60)
LYMPHOCYTES # BLD AUTO: 1.8 K/UL (ref 1–4.8)
LYMPHOCYTES NFR BLD: 20.8 % (ref 18–48)
MAGNESIUM SERPL-MCNC: 1.9 MG/DL (ref 1.6–2.6)
MCH RBC QN AUTO: 28.3 PG (ref 27–31)
MCHC RBC AUTO-ENTMCNC: 30.8 G/DL (ref 32–36)
MCV RBC AUTO: 92 FL (ref 82–98)
MONOCYTES # BLD AUTO: 1 K/UL (ref 0.3–1)
MONOCYTES NFR BLD: 11.3 % (ref 4–15)
NEUTROPHILS # BLD AUTO: 5.7 K/UL (ref 1.8–7.7)
NEUTROPHILS NFR BLD: 66.5 % (ref 38–73)
NRBC BLD-RTO: 0 /100 WBC
PLATELET # BLD AUTO: 386 K/UL (ref 150–350)
PMV BLD AUTO: 9.5 FL (ref 9.2–12.9)
POTASSIUM SERPL-SCNC: 4.1 MMOL/L (ref 3.5–5.1)
PROT SERPL-MCNC: 8.9 G/DL (ref 6–8.4)
RBC # BLD AUTO: 5.05 M/UL (ref 4–5.4)
SARS-COV-2 RDRP RESP QL NAA+PROBE: NEGATIVE
SODIUM SERPL-SCNC: 140 MMOL/L (ref 136–145)
WBC # BLD AUTO: 8.49 K/UL (ref 3.9–12.7)

## 2021-01-03 PROCEDURE — 25000003 PHARM REV CODE 250: Performed by: INTERNAL MEDICINE

## 2021-01-03 PROCEDURE — 84100 ASSAY OF PHOSPHORUS: CPT

## 2021-01-03 PROCEDURE — 99291 CRITICAL CARE FIRST HOUR: CPT | Mod: ,,, | Performed by: EMERGENCY MEDICINE

## 2021-01-03 PROCEDURE — 93005 ELECTROCARDIOGRAM TRACING: CPT

## 2021-01-03 PROCEDURE — 63600175 PHARM REV CODE 636 W HCPCS: Performed by: INTERNAL MEDICINE

## 2021-01-03 PROCEDURE — U0002 COVID-19 LAB TEST NON-CDC: HCPCS | Performed by: EMERGENCY MEDICINE

## 2021-01-03 PROCEDURE — 83605 ASSAY OF LACTIC ACID: CPT | Mod: 91

## 2021-01-03 PROCEDURE — 99285 EMERGENCY DEPT VISIT HI MDM: CPT | Mod: 25

## 2021-01-03 PROCEDURE — 99291 PR CRITICAL CARE, E/M 30-74 MINUTES: ICD-10-PCS | Mod: ,,, | Performed by: EMERGENCY MEDICINE

## 2021-01-03 PROCEDURE — 11000001 HC ACUTE MED/SURG PRIVATE ROOM

## 2021-01-03 PROCEDURE — 25500020 PHARM REV CODE 255: Performed by: EMERGENCY MEDICINE

## 2021-01-03 PROCEDURE — 96374 THER/PROPH/DIAG INJ IV PUSH: CPT

## 2021-01-03 PROCEDURE — 83690 ASSAY OF LIPASE: CPT

## 2021-01-03 PROCEDURE — 85025 COMPLETE CBC W/AUTO DIFF WBC: CPT

## 2021-01-03 PROCEDURE — 83735 ASSAY OF MAGNESIUM: CPT

## 2021-01-03 PROCEDURE — 96361 HYDRATE IV INFUSION ADD-ON: CPT

## 2021-01-03 PROCEDURE — 87040 BLOOD CULTURE FOR BACTERIA: CPT | Mod: 59

## 2021-01-03 PROCEDURE — 80053 COMPREHEN METABOLIC PANEL: CPT

## 2021-01-03 PROCEDURE — 25000003 PHARM REV CODE 250: Performed by: EMERGENCY MEDICINE

## 2021-01-03 PROCEDURE — 93010 EKG 12-LEAD: ICD-10-PCS | Mod: ,,, | Performed by: INTERNAL MEDICINE

## 2021-01-03 PROCEDURE — 93010 ELECTROCARDIOGRAM REPORT: CPT | Mod: ,,, | Performed by: INTERNAL MEDICINE

## 2021-01-03 PROCEDURE — 63600175 PHARM REV CODE 636 W HCPCS: Performed by: EMERGENCY MEDICINE

## 2021-01-03 RX ORDER — ONDANSETRON 4 MG/1
4 TABLET, FILM COATED ORAL EVERY 6 HOURS PRN
Status: DISCONTINUED | OUTPATIENT
Start: 2021-01-03 | End: 2021-01-05 | Stop reason: HOSPADM

## 2021-01-03 RX ORDER — GABAPENTIN 100 MG/1
100 CAPSULE ORAL NIGHTLY
Status: DISCONTINUED | OUTPATIENT
Start: 2021-01-03 | End: 2021-01-05 | Stop reason: HOSPADM

## 2021-01-03 RX ORDER — IBUPROFEN 200 MG
24 TABLET ORAL
Status: DISCONTINUED | OUTPATIENT
Start: 2021-01-03 | End: 2021-01-05 | Stop reason: HOSPADM

## 2021-01-03 RX ORDER — PANTOPRAZOLE SODIUM 40 MG/1
40 TABLET, DELAYED RELEASE ORAL DAILY
Status: DISCONTINUED | OUTPATIENT
Start: 2021-01-04 | End: 2021-01-05 | Stop reason: HOSPADM

## 2021-01-03 RX ORDER — SODIUM CHLORIDE, SODIUM LACTATE, POTASSIUM CHLORIDE, CALCIUM CHLORIDE 600; 310; 30; 20 MG/100ML; MG/100ML; MG/100ML; MG/100ML
INJECTION, SOLUTION INTRAVENOUS CONTINUOUS
Status: DISCONTINUED | OUTPATIENT
Start: 2021-01-03 | End: 2021-01-05

## 2021-01-03 RX ORDER — ACETAMINOPHEN 325 MG/1
650 TABLET ORAL EVERY 8 HOURS PRN
Status: DISCONTINUED | OUTPATIENT
Start: 2021-01-03 | End: 2021-01-05 | Stop reason: HOSPADM

## 2021-01-03 RX ORDER — GLUCAGON 1 MG
1 KIT INJECTION
Status: DISCONTINUED | OUTPATIENT
Start: 2021-01-03 | End: 2021-01-05 | Stop reason: HOSPADM

## 2021-01-03 RX ORDER — IBUPROFEN 200 MG
16 TABLET ORAL
Status: DISCONTINUED | OUTPATIENT
Start: 2021-01-03 | End: 2021-01-05 | Stop reason: HOSPADM

## 2021-01-03 RX ORDER — ONDANSETRON 2 MG/ML
4 INJECTION INTRAMUSCULAR; INTRAVENOUS
Status: COMPLETED | OUTPATIENT
Start: 2021-01-03 | End: 2021-01-03

## 2021-01-03 RX ORDER — SODIUM CHLORIDE 0.9 % (FLUSH) 0.9 %
10 SYRINGE (ML) INJECTION
Status: DISCONTINUED | OUTPATIENT
Start: 2021-01-03 | End: 2021-01-05 | Stop reason: HOSPADM

## 2021-01-03 RX ADMIN — GABAPENTIN 100 MG: 100 CAPSULE ORAL at 10:01

## 2021-01-03 RX ADMIN — IOHEXOL 75 ML: 350 INJECTION, SOLUTION INTRAVENOUS at 06:01

## 2021-01-03 RX ADMIN — APIXABAN 5 MG: 5 TABLET, FILM COATED ORAL at 10:01

## 2021-01-03 RX ADMIN — ONDANSETRON 4 MG: 2 INJECTION INTRAMUSCULAR; INTRAVENOUS at 05:01

## 2021-01-03 RX ADMIN — SODIUM CHLORIDE 1000 ML: 0.9 INJECTION, SOLUTION INTRAVENOUS at 04:01

## 2021-01-03 RX ADMIN — SODIUM CHLORIDE 1000 ML: 0.9 INJECTION, SOLUTION INTRAVENOUS at 05:01

## 2021-01-03 RX ADMIN — SODIUM CHLORIDE, SODIUM LACTATE, POTASSIUM CHLORIDE, AND CALCIUM CHLORIDE: .6; .31; .03; .02 INJECTION, SOLUTION INTRAVENOUS at 10:01

## 2021-01-04 PROBLEM — E55.9 VITAMIN D DEFICIENCY: Status: ACTIVE | Noted: 2021-01-04

## 2021-01-04 LAB
25(OH)D3+25(OH)D2 SERPL-MCNC: 12 NG/ML (ref 30–96)
ALBUMIN SERPL BCP-MCNC: 2.9 G/DL (ref 3.5–5.2)
ALBUMIN SERPL BCP-MCNC: 3.2 G/DL (ref 3.5–5.2)
ANION GAP SERPL CALC-SCNC: 7 MMOL/L (ref 8–16)
ANION GAP SERPL CALC-SCNC: 9 MMOL/L (ref 8–16)
BACTERIA #/AREA URNS AUTO: ABNORMAL /HPF
BASOPHILS # BLD AUTO: 0.03 K/UL (ref 0–0.2)
BASOPHILS NFR BLD: 0.5 % (ref 0–1.9)
BILIRUB UR QL STRIP: NEGATIVE
BUN SERPL-MCNC: 12 MG/DL (ref 6–20)
BUN SERPL-MCNC: 14 MG/DL (ref 6–20)
CALCIUM SERPL-MCNC: 10.2 MG/DL (ref 8.7–10.5)
CALCIUM SERPL-MCNC: 9.9 MG/DL (ref 8.7–10.5)
CHLORIDE SERPL-SCNC: 117 MMOL/L (ref 95–110)
CHLORIDE SERPL-SCNC: 119 MMOL/L (ref 95–110)
CLARITY UR REFRACT.AUTO: ABNORMAL
CO2 SERPL-SCNC: 16 MMOL/L (ref 23–29)
CO2 SERPL-SCNC: 16 MMOL/L (ref 23–29)
COLOR UR AUTO: YELLOW
CREAT SERPL-MCNC: 0.9 MG/DL (ref 0.5–1.4)
CREAT SERPL-MCNC: 1.1 MG/DL (ref 0.5–1.4)
CREAT UR-MCNC: 82 MG/DL (ref 15–325)
DIFFERENTIAL METHOD: ABNORMAL
EOSINOPHIL # BLD AUTO: 0.1 K/UL (ref 0–0.5)
EOSINOPHIL NFR BLD: 0.9 % (ref 0–8)
ERYTHROCYTE [DISTWIDTH] IN BLOOD BY AUTOMATED COUNT: 18.4 % (ref 11.5–14.5)
EST. GFR  (AFRICAN AMERICAN): >60 ML/MIN/1.73 M^2
EST. GFR  (AFRICAN AMERICAN): >60 ML/MIN/1.73 M^2
EST. GFR  (NON AFRICAN AMERICAN): 55.9 ML/MIN/1.73 M^2
EST. GFR  (NON AFRICAN AMERICAN): >60 ML/MIN/1.73 M^2
GLUCOSE SERPL-MCNC: 116 MG/DL (ref 70–110)
GLUCOSE SERPL-MCNC: 94 MG/DL (ref 70–110)
GLUCOSE UR QL STRIP: NEGATIVE
HCT VFR BLD AUTO: 40.4 % (ref 37–48.5)
HGB BLD-MCNC: 12.2 G/DL (ref 12–16)
HGB UR QL STRIP: NEGATIVE
IMM GRANULOCYTES # BLD AUTO: 0.02 K/UL (ref 0–0.04)
IMM GRANULOCYTES NFR BLD AUTO: 0.3 % (ref 0–0.5)
KETONES UR QL STRIP: NEGATIVE
LACTATE SERPL-SCNC: 1.8 MMOL/L (ref 0.5–2.2)
LEUKOCYTE ESTERASE UR QL STRIP: ABNORMAL
LYMPHOCYTES # BLD AUTO: 1.4 K/UL (ref 1–4.8)
LYMPHOCYTES NFR BLD: 21.8 % (ref 18–48)
MAGNESIUM SERPL-MCNC: 1.7 MG/DL (ref 1.6–2.6)
MCH RBC QN AUTO: 28.1 PG (ref 27–31)
MCHC RBC AUTO-ENTMCNC: 30.2 G/DL (ref 32–36)
MCV RBC AUTO: 93 FL (ref 82–98)
MICROSCOPIC COMMENT: ABNORMAL
MONOCYTES # BLD AUTO: 1.1 K/UL (ref 0.3–1)
MONOCYTES NFR BLD: 15.9 % (ref 4–15)
NEUTROPHILS # BLD AUTO: 4 K/UL (ref 1.8–7.7)
NEUTROPHILS NFR BLD: 60.6 % (ref 38–73)
NITRITE UR QL STRIP: NEGATIVE
NRBC BLD-RTO: 0 /100 WBC
PH UR STRIP: 8 [PH] (ref 5–8)
PHOSPHATE SERPL-MCNC: 2.9 MG/DL (ref 2.7–4.5)
PHOSPHATE SERPL-MCNC: 2.9 MG/DL (ref 2.7–4.5)
PHOSPHATE SERPL-MCNC: 3 MG/DL (ref 2.7–4.5)
PHOSPHATE SERPL-MCNC: 4.1 MG/DL (ref 2.7–4.5)
PLATELET # BLD AUTO: 317 K/UL (ref 150–350)
PMV BLD AUTO: 9.9 FL (ref 9.2–12.9)
POTASSIUM SERPL-SCNC: 3.1 MMOL/L (ref 3.5–5.1)
POTASSIUM SERPL-SCNC: 3.3 MMOL/L (ref 3.5–5.1)
PROT UR QL STRIP: NEGATIVE
PROT UR-MCNC: 41 MG/DL (ref 0–15)
PROT/CREAT UR: 0.5 MG/G{CREAT} (ref 0–0.2)
PTH-INTACT SERPL-MCNC: 10 PG/ML (ref 9–77)
RBC # BLD AUTO: 4.34 M/UL (ref 4–5.4)
SODIUM SERPL-SCNC: 142 MMOL/L (ref 136–145)
SODIUM SERPL-SCNC: 142 MMOL/L (ref 136–145)
SP GR UR STRIP: 1.02 (ref 1–1.03)
URN SPEC COLLECT METH UR: ABNORMAL
WBC # BLD AUTO: 6.61 K/UL (ref 3.9–12.7)
WBC #/AREA URNS AUTO: 6 /HPF (ref 0–5)
YEAST UR QL AUTO: ABNORMAL

## 2021-01-04 PROCEDURE — 11000001 HC ACUTE MED/SURG PRIVATE ROOM

## 2021-01-04 PROCEDURE — 82397 CHEMILUMINESCENT ASSAY: CPT

## 2021-01-04 PROCEDURE — 82306 VITAMIN D 25 HYDROXY: CPT

## 2021-01-04 PROCEDURE — 97116 GAIT TRAINING THERAPY: CPT

## 2021-01-04 PROCEDURE — 97161 PT EVAL LOW COMPLEX 20 MIN: CPT

## 2021-01-04 PROCEDURE — 36415 COLL VENOUS BLD VENIPUNCTURE: CPT

## 2021-01-04 PROCEDURE — 83605 ASSAY OF LACTIC ACID: CPT

## 2021-01-04 PROCEDURE — 25000003 PHARM REV CODE 250: Performed by: STUDENT IN AN ORGANIZED HEALTH CARE EDUCATION/TRAINING PROGRAM

## 2021-01-04 PROCEDURE — 97165 OT EVAL LOW COMPLEX 30 MIN: CPT

## 2021-01-04 PROCEDURE — 99223 PR INITIAL HOSPITAL CARE,LEVL III: ICD-10-PCS | Mod: ,,, | Performed by: INTERNAL MEDICINE

## 2021-01-04 PROCEDURE — 97535 SELF CARE MNGMENT TRAINING: CPT

## 2021-01-04 PROCEDURE — 99223 1ST HOSP IP/OBS HIGH 75: CPT | Mod: ,,, | Performed by: INTERNAL MEDICINE

## 2021-01-04 PROCEDURE — 82652 VIT D 1 25-DIHYDROXY: CPT

## 2021-01-04 PROCEDURE — 63600175 PHARM REV CODE 636 W HCPCS: Performed by: INTERNAL MEDICINE

## 2021-01-04 PROCEDURE — 83970 ASSAY OF PARATHORMONE: CPT

## 2021-01-04 PROCEDURE — 80069 RENAL FUNCTION PANEL: CPT

## 2021-01-04 PROCEDURE — 81001 URINALYSIS AUTO W/SCOPE: CPT

## 2021-01-04 PROCEDURE — 85025 COMPLETE CBC W/AUTO DIFF WBC: CPT

## 2021-01-04 PROCEDURE — 84156 ASSAY OF PROTEIN URINE: CPT

## 2021-01-04 PROCEDURE — 83735 ASSAY OF MAGNESIUM: CPT

## 2021-01-04 PROCEDURE — 25000003 PHARM REV CODE 250: Performed by: INTERNAL MEDICINE

## 2021-01-04 RX ORDER — ERGOCALCIFEROL 1.25 MG/1
50000 CAPSULE ORAL
Status: DISCONTINUED | OUTPATIENT
Start: 2021-01-05 | End: 2021-01-05 | Stop reason: HOSPADM

## 2021-01-04 RX ORDER — POTASSIUM CHLORIDE 20 MEQ/1
40 TABLET, EXTENDED RELEASE ORAL ONCE
Status: COMPLETED | OUTPATIENT
Start: 2021-01-04 | End: 2021-01-04

## 2021-01-04 RX ADMIN — APIXABAN 5 MG: 5 TABLET, FILM COATED ORAL at 09:01

## 2021-01-04 RX ADMIN — GABAPENTIN 100 MG: 100 CAPSULE ORAL at 09:01

## 2021-01-04 RX ADMIN — POTASSIUM CHLORIDE 40 MEQ: 1500 TABLET, EXTENDED RELEASE ORAL at 07:01

## 2021-01-04 RX ADMIN — SODIUM CHLORIDE, SODIUM LACTATE, POTASSIUM CHLORIDE, AND CALCIUM CHLORIDE: .6; .31; .03; .02 INJECTION, SOLUTION INTRAVENOUS at 09:01

## 2021-01-04 RX ADMIN — POTASSIUM CHLORIDE 40 MEQ: 1500 TABLET, EXTENDED RELEASE ORAL at 09:01

## 2021-01-04 RX ADMIN — PANTOPRAZOLE SODIUM 40 MG: 40 TABLET, DELAYED RELEASE ORAL at 09:01

## 2021-01-05 VITALS
SYSTOLIC BLOOD PRESSURE: 115 MMHG | DIASTOLIC BLOOD PRESSURE: 76 MMHG | OXYGEN SATURATION: 97 % | HEART RATE: 79 BPM | HEIGHT: 69 IN | WEIGHT: 141.13 LBS | TEMPERATURE: 98 F | BODY MASS INDEX: 20.9 KG/M2 | RESPIRATION RATE: 14 BRPM

## 2021-01-05 LAB
1,25(OH)2D3 SERPL-MCNC: 7 PG/ML (ref 20–79)
ALBUMIN SERPL BCP-MCNC: 2.4 G/DL (ref 3.5–5.2)
ALP SERPL-CCNC: 97 U/L (ref 55–135)
ALT SERPL W/O P-5'-P-CCNC: 25 U/L (ref 10–44)
ANION GAP SERPL CALC-SCNC: 4 MMOL/L (ref 8–16)
AST SERPL-CCNC: 16 U/L (ref 10–40)
BASOPHILS # BLD AUTO: 0.03 K/UL (ref 0–0.2)
BASOPHILS NFR BLD: 0.5 % (ref 0–1.9)
BILIRUB SERPL-MCNC: 0.2 MG/DL (ref 0.1–1)
BUN SERPL-MCNC: 8 MG/DL (ref 6–20)
CALCIUM SERPL-MCNC: 8.6 MG/DL (ref 8.7–10.5)
CHLORIDE SERPL-SCNC: 118 MMOL/L (ref 95–110)
CO2 SERPL-SCNC: 17 MMOL/L (ref 23–29)
CREAT SERPL-MCNC: 0.8 MG/DL (ref 0.5–1.4)
DIFFERENTIAL METHOD: ABNORMAL
EOSINOPHIL # BLD AUTO: 0.1 K/UL (ref 0–0.5)
EOSINOPHIL NFR BLD: 2.1 % (ref 0–8)
ERYTHROCYTE [DISTWIDTH] IN BLOOD BY AUTOMATED COUNT: 18.7 % (ref 11.5–14.5)
EST. GFR  (AFRICAN AMERICAN): >60 ML/MIN/1.73 M^2
EST. GFR  (NON AFRICAN AMERICAN): >60 ML/MIN/1.73 M^2
GLUCOSE SERPL-MCNC: 88 MG/DL (ref 70–110)
HCT VFR BLD AUTO: 31.5 % (ref 37–48.5)
HGB BLD-MCNC: 9.3 G/DL (ref 12–16)
IMM GRANULOCYTES # BLD AUTO: 0.01 K/UL (ref 0–0.04)
IMM GRANULOCYTES NFR BLD AUTO: 0.2 % (ref 0–0.5)
LYMPHOCYTES # BLD AUTO: 1.7 K/UL (ref 1–4.8)
LYMPHOCYTES NFR BLD: 30.1 % (ref 18–48)
MAGNESIUM SERPL-MCNC: 1.3 MG/DL (ref 1.6–2.6)
MCH RBC QN AUTO: 27.8 PG (ref 27–31)
MCHC RBC AUTO-ENTMCNC: 29.5 G/DL (ref 32–36)
MCV RBC AUTO: 94 FL (ref 82–98)
MONOCYTES # BLD AUTO: 1 K/UL (ref 0.3–1)
MONOCYTES NFR BLD: 17.5 % (ref 4–15)
NEUTROPHILS # BLD AUTO: 2.8 K/UL (ref 1.8–7.7)
NEUTROPHILS NFR BLD: 49.6 % (ref 38–73)
NRBC BLD-RTO: 0 /100 WBC
PHOSPHATE SERPL-MCNC: 2.2 MG/DL (ref 2.7–4.5)
PLATELET # BLD AUTO: 259 K/UL (ref 150–350)
PMV BLD AUTO: 11 FL (ref 9.2–12.9)
POTASSIUM SERPL-SCNC: 3 MMOL/L (ref 3.5–5.1)
PROT SERPL-MCNC: 4.9 G/DL (ref 6–8.4)
RBC # BLD AUTO: 3.34 M/UL (ref 4–5.4)
SODIUM SERPL-SCNC: 139 MMOL/L (ref 136–145)
WBC # BLD AUTO: 5.61 K/UL (ref 3.9–12.7)

## 2021-01-05 PROCEDURE — 84100 ASSAY OF PHOSPHORUS: CPT

## 2021-01-05 PROCEDURE — 80053 COMPREHEN METABOLIC PANEL: CPT

## 2021-01-05 PROCEDURE — 25000003 PHARM REV CODE 250: Performed by: INTERNAL MEDICINE

## 2021-01-05 PROCEDURE — 63600175 PHARM REV CODE 636 W HCPCS: Performed by: STUDENT IN AN ORGANIZED HEALTH CARE EDUCATION/TRAINING PROGRAM

## 2021-01-05 PROCEDURE — 25000003 PHARM REV CODE 250: Performed by: STUDENT IN AN ORGANIZED HEALTH CARE EDUCATION/TRAINING PROGRAM

## 2021-01-05 PROCEDURE — 85025 COMPLETE CBC W/AUTO DIFF WBC: CPT

## 2021-01-05 PROCEDURE — 36415 COLL VENOUS BLD VENIPUNCTURE: CPT

## 2021-01-05 PROCEDURE — 83735 ASSAY OF MAGNESIUM: CPT

## 2021-01-05 PROCEDURE — 99238 HOSP IP/OBS DSCHRG MGMT 30/<: CPT | Mod: ,,, | Performed by: INTERNAL MEDICINE

## 2021-01-05 PROCEDURE — 99238 PR HOSPITAL DISCHARGE DAY,<30 MIN: ICD-10-PCS | Mod: ,,, | Performed by: INTERNAL MEDICINE

## 2021-01-05 RX ORDER — MAGNESIUM SULFATE HEPTAHYDRATE 40 MG/ML
2 INJECTION, SOLUTION INTRAVENOUS ONCE
Status: COMPLETED | OUTPATIENT
Start: 2021-01-05 | End: 2021-01-05

## 2021-01-05 RX ORDER — PANTOPRAZOLE SODIUM 40 MG/1
40 TABLET, DELAYED RELEASE ORAL DAILY
Qty: 30 TABLET | Refills: 11 | Status: ON HOLD | OUTPATIENT
Start: 2021-01-05 | End: 2021-09-06 | Stop reason: SDUPTHER

## 2021-01-05 RX ORDER — POTASSIUM CHLORIDE 7.45 MG/ML
10 INJECTION INTRAVENOUS
Status: COMPLETED | OUTPATIENT
Start: 2021-01-05 | End: 2021-01-05

## 2021-01-05 RX ORDER — ERGOCALCIFEROL 1.25 MG/1
50000 CAPSULE ORAL
Qty: 4 CAPSULE | Refills: 11 | Status: ON HOLD | OUTPATIENT
Start: 2021-01-05 | End: 2021-09-06 | Stop reason: SDUPTHER

## 2021-01-05 RX ORDER — POTASSIUM CHLORIDE 20 MEQ/1
40 TABLET, EXTENDED RELEASE ORAL ONCE
Status: COMPLETED | OUTPATIENT
Start: 2021-01-05 | End: 2021-01-05

## 2021-01-05 RX ADMIN — SODIUM CHLORIDE, SODIUM LACTATE, POTASSIUM CHLORIDE, AND CALCIUM CHLORIDE: .6; .31; .03; .02 INJECTION, SOLUTION INTRAVENOUS at 02:01

## 2021-01-05 RX ADMIN — ERGOCALCIFEROL 50000 UNITS: 1.25 CAPSULE ORAL at 08:01

## 2021-01-05 RX ADMIN — POTASSIUM CHLORIDE 10 MEQ: 7.46 INJECTION, SOLUTION INTRAVENOUS at 10:01

## 2021-01-05 RX ADMIN — POTASSIUM CHLORIDE 40 MEQ: 1500 TABLET, EXTENDED RELEASE ORAL at 08:01

## 2021-01-05 RX ADMIN — POTASSIUM CHLORIDE 10 MEQ: 7.46 INJECTION, SOLUTION INTRAVENOUS at 11:01

## 2021-01-05 RX ADMIN — PANTOPRAZOLE SODIUM 40 MG: 40 TABLET, DELAYED RELEASE ORAL at 08:01

## 2021-01-05 RX ADMIN — APIXABAN 5 MG: 5 TABLET, FILM COATED ORAL at 08:01

## 2021-01-05 RX ADMIN — MAGNESIUM SULFATE 2 G: 2 INJECTION INTRAVENOUS at 08:01

## 2021-01-06 ENCOUNTER — PATIENT OUTREACH (OUTPATIENT)
Dept: ADMINISTRATIVE | Facility: CLINIC | Age: 56
End: 2021-01-06

## 2021-01-07 LAB — PTH RELATED PROT SERPL-SCNC: 0.5 PMOL/L

## 2021-01-09 LAB
BACTERIA BLD CULT: NORMAL
BACTERIA BLD CULT: NORMAL

## 2021-01-12 ENCOUNTER — TELEPHONE (OUTPATIENT)
Dept: INTERNAL MEDICINE | Facility: CLINIC | Age: 56
End: 2021-01-12

## 2021-01-15 ENCOUNTER — HOSPITAL ENCOUNTER (EMERGENCY)
Facility: HOSPITAL | Age: 56
Discharge: HOME | End: 2021-01-15
Attending: EMERGENCY MEDICINE
Payer: MEDICAID

## 2021-01-15 VITALS
DIASTOLIC BLOOD PRESSURE: 58 MMHG | HEART RATE: 86 BPM | WEIGHT: 141 LBS | HEIGHT: 69 IN | TEMPERATURE: 98 F | SYSTOLIC BLOOD PRESSURE: 106 MMHG | OXYGEN SATURATION: 100 % | RESPIRATION RATE: 20 BRPM | BODY MASS INDEX: 20.88 KG/M2

## 2021-01-15 DIAGNOSIS — K94.00 COLOSTOMY COMPLICATION: Primary | ICD-10-CM

## 2021-01-15 LAB
CTP QC/QA: YES
SARS-COV-2 RDRP RESP QL NAA+PROBE: NEGATIVE

## 2021-01-15 PROCEDURE — 99284 EMERGENCY DEPT VISIT MOD MDM: CPT | Mod: CS,,, | Performed by: EMERGENCY MEDICINE

## 2021-01-15 PROCEDURE — 99284 PR EMERGENCY DEPT VISIT,LEVEL IV: ICD-10-PCS | Mod: CS,,, | Performed by: EMERGENCY MEDICINE

## 2021-01-15 PROCEDURE — 99282 EMERGENCY DEPT VISIT SF MDM: CPT

## 2021-01-15 PROCEDURE — U0002 COVID-19 LAB TEST NON-CDC: HCPCS | Performed by: EMERGENCY MEDICINE

## 2021-01-27 ENCOUNTER — TELEPHONE (OUTPATIENT)
Dept: INTERNAL MEDICINE | Facility: CLINIC | Age: 56
End: 2021-01-27

## 2021-01-27 DIAGNOSIS — Z09 HOSPITAL DISCHARGE FOLLOW-UP: Primary | ICD-10-CM

## 2021-02-03 ENCOUNTER — HOSPITAL ENCOUNTER (INPATIENT)
Facility: HOSPITAL | Age: 56
LOS: 7 days | Discharge: HOME | DRG: 641 | End: 2021-02-10
Attending: EMERGENCY MEDICINE | Admitting: EMERGENCY MEDICINE
Payer: MEDICAID

## 2021-02-03 DIAGNOSIS — E87.20 METABOLIC ACIDOSIS: Primary | ICD-10-CM

## 2021-02-03 DIAGNOSIS — Z43.3 COLOSTOMY CARE: ICD-10-CM

## 2021-02-03 DIAGNOSIS — E86.0 DEHYDRATION: ICD-10-CM

## 2021-02-03 DIAGNOSIS — E44.0 MODERATE MALNUTRITION: ICD-10-CM

## 2021-02-03 DIAGNOSIS — E87.5 HYPERKALEMIA: ICD-10-CM

## 2021-02-03 DIAGNOSIS — E87.6 HYPOKALEMIA: ICD-10-CM

## 2021-02-03 DIAGNOSIS — Z93.6 PRESENCE OF UROSTOMY: ICD-10-CM

## 2021-02-03 DIAGNOSIS — C77.9 METASTATIC SQUAMOUS CELL CARCINOMA TO LYMPH NODE: ICD-10-CM

## 2021-02-03 DIAGNOSIS — Z93.2 ILEOSTOMY IN PLACE: ICD-10-CM

## 2021-02-03 DIAGNOSIS — R23.8 SKIN BREAKDOWN: ICD-10-CM

## 2021-02-03 DIAGNOSIS — Z78.9 DIFFICULTY MANAGING CARE OF STOMA: ICD-10-CM

## 2021-02-03 PROBLEM — N17.9 AKI (ACUTE KIDNEY INJURY): Status: RESOLVED | Noted: 2020-03-21 | Resolved: 2021-02-03

## 2021-02-03 PROBLEM — Z86.718 HISTORY OF DVT (DEEP VEIN THROMBOSIS): Chronic | Status: ACTIVE | Noted: 2021-02-03

## 2021-02-03 PROBLEM — Z79.01 CHRONIC ANTICOAGULATION: Chronic | Status: ACTIVE | Noted: 2021-02-03

## 2021-02-03 LAB
ALBUMIN SERPL BCP-MCNC: 3.1 G/DL (ref 3.5–5.2)
ALP SERPL-CCNC: 97 U/L (ref 55–135)
ALT SERPL W/O P-5'-P-CCNC: 15 U/L (ref 10–44)
ANION GAP SERPL CALC-SCNC: 11 MMOL/L (ref 8–16)
AST SERPL-CCNC: 18 U/L (ref 10–40)
BASOPHILS # BLD AUTO: 0.05 K/UL (ref 0–0.2)
BASOPHILS NFR BLD: 0.6 % (ref 0–1.9)
BILIRUB SERPL-MCNC: 0.3 MG/DL (ref 0.1–1)
BUN SERPL-MCNC: 7 MG/DL (ref 6–20)
BUN SERPL-MCNC: 9 MG/DL (ref 6–30)
BUN SERPL-MCNC: 9 MG/DL (ref 6–30)
CALCIUM SERPL-MCNC: 10.7 MG/DL (ref 8.7–10.5)
CHLORIDE SERPL-SCNC: 122 MMOL/L (ref 95–110)
CHLORIDE SERPL-SCNC: 123 MMOL/L (ref 95–110)
CHLORIDE SERPL-SCNC: 123 MMOL/L (ref 95–110)
CO2 SERPL-SCNC: 8 MMOL/L (ref 23–29)
CREAT SERPL-MCNC: 1.1 MG/DL (ref 0.5–1.4)
CREAT SERPL-MCNC: 1.1 MG/DL (ref 0.5–1.4)
CREAT SERPL-MCNC: 1.2 MG/DL (ref 0.5–1.4)
CTP QC/QA: YES
DIFFERENTIAL METHOD: ABNORMAL
EOSINOPHIL # BLD AUTO: 0.1 K/UL (ref 0–0.5)
EOSINOPHIL NFR BLD: 1 % (ref 0–8)
ERYTHROCYTE [DISTWIDTH] IN BLOOD BY AUTOMATED COUNT: 18.5 % (ref 11.5–14.5)
EST. GFR  (AFRICAN AMERICAN): >60 ML/MIN/1.73 M^2
EST. GFR  (NON AFRICAN AMERICAN): 55.5 ML/MIN/1.73 M^2
GLUCOSE SERPL-MCNC: 89 MG/DL (ref 70–110)
GLUCOSE SERPL-MCNC: 91 MG/DL (ref 70–110)
GLUCOSE SERPL-MCNC: 92 MG/DL (ref 70–110)
HCT VFR BLD AUTO: 41.1 % (ref 37–48.5)
HCT VFR BLD CALC: 35 %PCV (ref 36–54)
HCT VFR BLD CALC: 37 %PCV (ref 36–54)
HGB BLD-MCNC: 12.8 G/DL (ref 12–16)
IMM GRANULOCYTES # BLD AUTO: 0.01 K/UL (ref 0–0.04)
IMM GRANULOCYTES NFR BLD AUTO: 0.1 % (ref 0–0.5)
LYMPHOCYTES # BLD AUTO: 1.6 K/UL (ref 1–4.8)
LYMPHOCYTES NFR BLD: 20.6 % (ref 18–48)
MAGNESIUM SERPL-MCNC: 1.8 MG/DL (ref 1.6–2.6)
MCH RBC QN AUTO: 29.2 PG (ref 27–31)
MCHC RBC AUTO-ENTMCNC: 31.1 G/DL (ref 32–36)
MCV RBC AUTO: 94 FL (ref 82–98)
MONOCYTES # BLD AUTO: 1.2 K/UL (ref 0.3–1)
MONOCYTES NFR BLD: 14.5 % (ref 4–15)
NEUTROPHILS # BLD AUTO: 5 K/UL (ref 1.8–7.7)
NEUTROPHILS NFR BLD: 63.2 % (ref 38–73)
NRBC BLD-RTO: 0 /100 WBC
PHOSPHATE SERPL-MCNC: 2.8 MG/DL (ref 2.7–4.5)
PLATELET # BLD AUTO: 245 K/UL (ref 150–350)
PMV BLD AUTO: 11.9 FL (ref 9.2–12.9)
POC IONIZED CALCIUM: 1.33 MMOL/L (ref 1.06–1.42)
POC IONIZED CALCIUM: 1.33 MMOL/L (ref 1.06–1.42)
POC TCO2 (MEASURED): 14 MMOL/L (ref 23–29)
POC TCO2 (MEASURED): 14 MMOL/L (ref 23–29)
POTASSIUM BLD-SCNC: 7.2 MMOL/L (ref 3.5–5.1)
POTASSIUM BLD-SCNC: 7.2 MMOL/L (ref 3.5–5.1)
POTASSIUM SERPL-SCNC: 2.4 MMOL/L (ref 3.5–5.1)
PROT SERPL-MCNC: 6.7 G/DL (ref 6–8.4)
RBC # BLD AUTO: 4.39 M/UL (ref 4–5.4)
SAMPLE: ABNORMAL
SAMPLE: ABNORMAL
SARS-COV-2 RDRP RESP QL NAA+PROBE: NEGATIVE
SODIUM BLD-SCNC: 138 MMOL/L (ref 136–145)
SODIUM BLD-SCNC: 138 MMOL/L (ref 136–145)
SODIUM SERPL-SCNC: 141 MMOL/L (ref 136–145)
WBC # BLD AUTO: 7.92 K/UL (ref 3.9–12.7)

## 2021-02-03 PROCEDURE — 93010 ELECTROCARDIOGRAM REPORT: CPT | Mod: ,,, | Performed by: INTERNAL MEDICINE

## 2021-02-03 PROCEDURE — 99223 1ST HOSP IP/OBS HIGH 75: CPT | Mod: ,,, | Performed by: PHYSICIAN ASSISTANT

## 2021-02-03 PROCEDURE — 93005 ELECTROCARDIOGRAM TRACING: CPT

## 2021-02-03 PROCEDURE — 85025 COMPLETE CBC W/AUTO DIFF WBC: CPT

## 2021-02-03 PROCEDURE — 93010 EKG 12-LEAD: ICD-10-PCS | Mod: ,,, | Performed by: INTERNAL MEDICINE

## 2021-02-03 PROCEDURE — 83735 ASSAY OF MAGNESIUM: CPT

## 2021-02-03 PROCEDURE — 11000001 HC ACUTE MED/SURG PRIVATE ROOM

## 2021-02-03 PROCEDURE — 99285 EMERGENCY DEPT VISIT HI MDM: CPT | Mod: ,,, | Performed by: EMERGENCY MEDICINE

## 2021-02-03 PROCEDURE — 80053 COMPREHEN METABOLIC PANEL: CPT

## 2021-02-03 PROCEDURE — 84100 ASSAY OF PHOSPHORUS: CPT

## 2021-02-03 PROCEDURE — U0002 COVID-19 LAB TEST NON-CDC: HCPCS | Performed by: PHYSICIAN ASSISTANT

## 2021-02-03 PROCEDURE — 80047 BASIC METABLC PNL IONIZED CA: CPT

## 2021-02-03 PROCEDURE — 99285 PR EMERGENCY DEPT VISIT,LEVEL V: ICD-10-PCS | Mod: ,,, | Performed by: EMERGENCY MEDICINE

## 2021-02-03 PROCEDURE — 99223 PR INITIAL HOSPITAL CARE,LEVL III: ICD-10-PCS | Mod: ,,, | Performed by: PHYSICIAN ASSISTANT

## 2021-02-03 PROCEDURE — 99285 EMERGENCY DEPT VISIT HI MDM: CPT | Mod: 25

## 2021-02-03 PROCEDURE — 12000002 HC ACUTE/MED SURGE SEMI-PRIVATE ROOM

## 2021-02-03 RX ORDER — POTASSIUM CHLORIDE 7.45 MG/ML
10 INJECTION INTRAVENOUS
Status: COMPLETED | OUTPATIENT
Start: 2021-02-04 | End: 2021-02-04

## 2021-02-03 RX ORDER — POTASSIUM CHLORIDE 20 MEQ/1
40 TABLET, EXTENDED RELEASE ORAL ONCE
Status: COMPLETED | OUTPATIENT
Start: 2021-02-04 | End: 2021-02-04

## 2021-02-04 LAB
ALBUMIN SERPL BCP-MCNC: 3.1 G/DL (ref 3.5–5.2)
ALP SERPL-CCNC: 101 U/L (ref 55–135)
ALT SERPL W/O P-5'-P-CCNC: 15 U/L (ref 10–44)
ANION GAP SERPL CALC-SCNC: 7 MMOL/L (ref 8–16)
ANION GAP SERPL CALC-SCNC: 9 MMOL/L (ref 8–16)
AST SERPL-CCNC: 17 U/L (ref 10–40)
BASOPHILS # BLD AUTO: 0.04 K/UL (ref 0–0.2)
BASOPHILS NFR BLD: 0.6 % (ref 0–1.9)
BILIRUB SERPL-MCNC: 0.3 MG/DL (ref 0.1–1)
BUN SERPL-MCNC: 6 MG/DL (ref 6–20)
BUN SERPL-MCNC: 7 MG/DL (ref 6–20)
CALCIUM SERPL-MCNC: 10.1 MG/DL (ref 8.7–10.5)
CALCIUM SERPL-MCNC: 10.5 MG/DL (ref 8.7–10.5)
CHLORIDE SERPL-SCNC: 120 MMOL/L (ref 95–110)
CHLORIDE SERPL-SCNC: 124 MMOL/L (ref 95–110)
CO2 SERPL-SCNC: 13 MMOL/L (ref 23–29)
CO2 SERPL-SCNC: 14 MMOL/L (ref 23–29)
CREAT SERPL-MCNC: 1 MG/DL (ref 0.5–1.4)
CREAT SERPL-MCNC: 1.1 MG/DL (ref 0.5–1.4)
DIFFERENTIAL METHOD: ABNORMAL
EOSINOPHIL # BLD AUTO: 0.1 K/UL (ref 0–0.5)
EOSINOPHIL NFR BLD: 1.3 % (ref 0–8)
ERYTHROCYTE [DISTWIDTH] IN BLOOD BY AUTOMATED COUNT: 16.6 % (ref 11.5–14.5)
EST. GFR  (AFRICAN AMERICAN): >60 ML/MIN/1.73 M^2
EST. GFR  (AFRICAN AMERICAN): >60 ML/MIN/1.73 M^2
EST. GFR  (NON AFRICAN AMERICAN): 55.5 ML/MIN/1.73 M^2
EST. GFR  (NON AFRICAN AMERICAN): >60 ML/MIN/1.73 M^2
ESTIMATED AVG GLUCOSE: 94 MG/DL (ref 68–131)
GLUCOSE SERPL-MCNC: 114 MG/DL (ref 70–110)
GLUCOSE SERPL-MCNC: 91 MG/DL (ref 70–110)
HBA1C MFR BLD: 4.9 % (ref 4–5.6)
HCT VFR BLD AUTO: 37.9 % (ref 37–48.5)
HGB BLD-MCNC: 11.7 G/DL (ref 12–16)
IMM GRANULOCYTES # BLD AUTO: 0.01 K/UL (ref 0–0.04)
IMM GRANULOCYTES NFR BLD AUTO: 0.2 % (ref 0–0.5)
LYMPHOCYTES # BLD AUTO: 1.1 K/UL (ref 1–4.8)
LYMPHOCYTES NFR BLD: 17.9 % (ref 18–48)
MAGNESIUM SERPL-MCNC: 1.8 MG/DL (ref 1.6–2.6)
MCH RBC QN AUTO: 28.7 PG (ref 27–31)
MCHC RBC AUTO-ENTMCNC: 30.9 G/DL (ref 32–36)
MCV RBC AUTO: 93 FL (ref 82–98)
MONOCYTES # BLD AUTO: 1 K/UL (ref 0.3–1)
MONOCYTES NFR BLD: 15.5 % (ref 4–15)
NEUTROPHILS # BLD AUTO: 4.1 K/UL (ref 1.8–7.7)
NEUTROPHILS NFR BLD: 64.5 % (ref 38–73)
NRBC BLD-RTO: 0 /100 WBC
PHOSPHATE SERPL-MCNC: 3.1 MG/DL (ref 2.7–4.5)
PLATELET # BLD AUTO: 229 K/UL (ref 150–350)
PMV BLD AUTO: 10.9 FL (ref 9.2–12.9)
POTASSIUM SERPL-SCNC: 2.6 MMOL/L (ref 3.5–5.1)
POTASSIUM SERPL-SCNC: 3.2 MMOL/L (ref 3.5–5.1)
PROT SERPL-MCNC: 6.6 G/DL (ref 6–8.4)
RBC # BLD AUTO: 4.08 M/UL (ref 4–5.4)
SODIUM SERPL-SCNC: 142 MMOL/L (ref 136–145)
SODIUM SERPL-SCNC: 145 MMOL/L (ref 136–145)
WBC # BLD AUTO: 6.33 K/UL (ref 3.9–12.7)

## 2021-02-04 PROCEDURE — 99233 PR SUBSEQUENT HOSPITAL CARE,LEVL III: ICD-10-PCS | Mod: ,,, | Performed by: PHYSICIAN ASSISTANT

## 2021-02-04 PROCEDURE — 83735 ASSAY OF MAGNESIUM: CPT

## 2021-02-04 PROCEDURE — 80048 BASIC METABOLIC PNL TOTAL CA: CPT

## 2021-02-04 PROCEDURE — 80053 COMPREHEN METABOLIC PANEL: CPT

## 2021-02-04 PROCEDURE — 84100 ASSAY OF PHOSPHORUS: CPT

## 2021-02-04 PROCEDURE — 25000003 PHARM REV CODE 250: Performed by: PHYSICIAN ASSISTANT

## 2021-02-04 PROCEDURE — 63600175 PHARM REV CODE 636 W HCPCS: Performed by: PHYSICIAN ASSISTANT

## 2021-02-04 PROCEDURE — 83036 HEMOGLOBIN GLYCOSYLATED A1C: CPT

## 2021-02-04 PROCEDURE — 85025 COMPLETE CBC W/AUTO DIFF WBC: CPT

## 2021-02-04 PROCEDURE — 11000001 HC ACUTE MED/SURG PRIVATE ROOM

## 2021-02-04 PROCEDURE — 99233 SBSQ HOSP IP/OBS HIGH 50: CPT | Mod: ,,, | Performed by: PHYSICIAN ASSISTANT

## 2021-02-04 PROCEDURE — 36415 COLL VENOUS BLD VENIPUNCTURE: CPT

## 2021-02-04 RX ORDER — IPRATROPIUM BROMIDE AND ALBUTEROL SULFATE 2.5; .5 MG/3ML; MG/3ML
3 SOLUTION RESPIRATORY (INHALATION) EVERY 4 HOURS PRN
Status: DISCONTINUED | OUTPATIENT
Start: 2021-02-04 | End: 2021-02-04

## 2021-02-04 RX ORDER — SODIUM CHLORIDE 9 MG/ML
INJECTION, SOLUTION INTRAVENOUS CONTINUOUS
Status: ACTIVE | OUTPATIENT
Start: 2021-02-04 | End: 2021-02-04

## 2021-02-04 RX ORDER — MIRTAZAPINE 15 MG/1
15 TABLET, ORALLY DISINTEGRATING ORAL NIGHTLY
Status: DISCONTINUED | OUTPATIENT
Start: 2021-02-04 | End: 2021-02-10 | Stop reason: HOSPADM

## 2021-02-04 RX ORDER — SODIUM CHLORIDE 0.9 % (FLUSH) 0.9 %
5 SYRINGE (ML) INJECTION
Status: DISCONTINUED | OUTPATIENT
Start: 2021-02-04 | End: 2021-02-10 | Stop reason: HOSPADM

## 2021-02-04 RX ORDER — PANTOPRAZOLE SODIUM 40 MG/1
40 TABLET, DELAYED RELEASE ORAL DAILY
Status: DISCONTINUED | OUTPATIENT
Start: 2021-02-04 | End: 2021-02-10 | Stop reason: HOSPADM

## 2021-02-04 RX ORDER — METOPROLOL TARTRATE 25 MG/1
12.5 TABLET ORAL DAILY
Status: DISCONTINUED | OUTPATIENT
Start: 2021-02-04 | End: 2021-02-10 | Stop reason: HOSPADM

## 2021-02-04 RX ORDER — ONDANSETRON 8 MG/1
8 TABLET, ORALLY DISINTEGRATING ORAL EVERY 8 HOURS PRN
Status: DISCONTINUED | OUTPATIENT
Start: 2021-02-04 | End: 2021-02-10 | Stop reason: HOSPADM

## 2021-02-04 RX ORDER — MAGNESIUM SULFATE HEPTAHYDRATE 40 MG/ML
2 INJECTION, SOLUTION INTRAVENOUS ONCE
Status: COMPLETED | OUTPATIENT
Start: 2021-02-04 | End: 2021-02-04

## 2021-02-04 RX ORDER — GLUCAGON 1 MG
1 KIT INJECTION
Status: DISCONTINUED | OUTPATIENT
Start: 2021-02-04 | End: 2021-02-10 | Stop reason: HOSPADM

## 2021-02-04 RX ORDER — POTASSIUM CHLORIDE 20 MEQ/1
40 TABLET, EXTENDED RELEASE ORAL EVERY 4 HOURS
Status: COMPLETED | OUTPATIENT
Start: 2021-02-04 | End: 2021-02-04

## 2021-02-04 RX ORDER — GABAPENTIN 100 MG/1
100 CAPSULE ORAL NIGHTLY
Status: DISCONTINUED | OUTPATIENT
Start: 2021-02-04 | End: 2021-02-10 | Stop reason: HOSPADM

## 2021-02-04 RX ORDER — AMOXICILLIN 250 MG
1 CAPSULE ORAL 2 TIMES DAILY
Status: DISCONTINUED | OUTPATIENT
Start: 2021-02-04 | End: 2021-02-05

## 2021-02-04 RX ORDER — IBUPROFEN 200 MG
16 TABLET ORAL
Status: DISCONTINUED | OUTPATIENT
Start: 2021-02-04 | End: 2021-02-10 | Stop reason: HOSPADM

## 2021-02-04 RX ORDER — TALC
6 POWDER (GRAM) TOPICAL NIGHTLY PRN
Status: DISCONTINUED | OUTPATIENT
Start: 2021-02-04 | End: 2021-02-10 | Stop reason: HOSPADM

## 2021-02-04 RX ORDER — ONDANSETRON 2 MG/ML
4 INJECTION INTRAMUSCULAR; INTRAVENOUS EVERY 8 HOURS PRN
Status: DISCONTINUED | OUTPATIENT
Start: 2021-02-04 | End: 2021-02-10 | Stop reason: HOSPADM

## 2021-02-04 RX ORDER — POTASSIUM CHLORIDE 7.45 MG/ML
10 INJECTION INTRAVENOUS
Status: DISCONTINUED | OUTPATIENT
Start: 2021-02-04 | End: 2021-02-04

## 2021-02-04 RX ORDER — MAGNESIUM SULFATE HEPTAHYDRATE 40 MG/ML
2 INJECTION, SOLUTION INTRAVENOUS
Status: DISCONTINUED | OUTPATIENT
Start: 2021-02-04 | End: 2021-02-04 | Stop reason: DRUGHIGH

## 2021-02-04 RX ORDER — POTASSIUM CHLORIDE 20 MEQ/1
40 TABLET, EXTENDED RELEASE ORAL DAILY
Status: DISCONTINUED | OUTPATIENT
Start: 2021-02-04 | End: 2021-02-04

## 2021-02-04 RX ORDER — ACETAMINOPHEN 325 MG/1
650 TABLET ORAL EVERY 4 HOURS PRN
Status: DISCONTINUED | OUTPATIENT
Start: 2021-02-04 | End: 2021-02-10 | Stop reason: HOSPADM

## 2021-02-04 RX ORDER — IBUPROFEN 200 MG
24 TABLET ORAL
Status: DISCONTINUED | OUTPATIENT
Start: 2021-02-04 | End: 2021-02-10 | Stop reason: HOSPADM

## 2021-02-04 RX ORDER — PROMETHAZINE HYDROCHLORIDE 25 MG/1
25 TABLET ORAL EVERY 6 HOURS PRN
Status: DISCONTINUED | OUTPATIENT
Start: 2021-02-04 | End: 2021-02-10 | Stop reason: HOSPADM

## 2021-02-04 RX ADMIN — POTASSIUM CHLORIDE 10 MEQ: 10 INJECTION, SOLUTION INTRAVENOUS at 02:02

## 2021-02-04 RX ADMIN — APIXABAN 5 MG: 5 TABLET, FILM COATED ORAL at 10:02

## 2021-02-04 RX ADMIN — POTASSIUM CHLORIDE 10 MEQ: 7.46 INJECTION, SOLUTION INTRAVENOUS at 10:02

## 2021-02-04 RX ADMIN — SODIUM CHLORIDE: 0.9 INJECTION, SOLUTION INTRAVENOUS at 10:02

## 2021-02-04 RX ADMIN — POTASSIUM CHLORIDE 40 MEQ: 1500 TABLET, EXTENDED RELEASE ORAL at 10:02

## 2021-02-04 RX ADMIN — POTASSIUM CHLORIDE 10 MEQ: 10 INJECTION, SOLUTION INTRAVENOUS at 03:02

## 2021-02-04 RX ADMIN — GABAPENTIN 100 MG: 100 CAPSULE ORAL at 09:02

## 2021-02-04 RX ADMIN — POTASSIUM CHLORIDE 40 MEQ: 1500 TABLET, EXTENDED RELEASE ORAL at 02:02

## 2021-02-04 RX ADMIN — DOCUSATE SODIUM 50MG AND SENNOSIDES 8.6MG 1 TABLET: 8.6; 5 TABLET, FILM COATED ORAL at 09:02

## 2021-02-04 RX ADMIN — POTASSIUM CHLORIDE 10 MEQ: 7.46 INJECTION, SOLUTION INTRAVENOUS at 02:02

## 2021-02-04 RX ADMIN — APIXABAN 5 MG: 5 TABLET, FILM COATED ORAL at 09:02

## 2021-02-04 RX ADMIN — MIRTAZAPINE 15 MG: 15 TABLET, ORALLY DISINTEGRATING ORAL at 10:02

## 2021-02-04 RX ADMIN — POTASSIUM CHLORIDE 10 MEQ: 10 INJECTION, SOLUTION INTRAVENOUS at 06:02

## 2021-02-04 RX ADMIN — DOCUSATE SODIUM 50MG AND SENNOSIDES 8.6MG 1 TABLET: 8.6; 5 TABLET, FILM COATED ORAL at 10:02

## 2021-02-04 RX ADMIN — MAGNESIUM SULFATE 2 G: 2 INJECTION INTRAVENOUS at 10:02

## 2021-02-04 RX ADMIN — PANTOPRAZOLE SODIUM 40 MG: 40 TABLET, DELAYED RELEASE ORAL at 10:02

## 2021-02-04 RX ADMIN — METOPROLOL TARTRATE 12.5 MG: 25 TABLET, FILM COATED ORAL at 10:02

## 2021-02-04 RX ADMIN — POTASSIUM CHLORIDE 10 MEQ: 10 INJECTION, SOLUTION INTRAVENOUS at 12:02

## 2021-02-04 RX ADMIN — POTASSIUM CHLORIDE 40 MEQ: 1500 TABLET, EXTENDED RELEASE ORAL at 12:02

## 2021-02-05 PROBLEM — E87.20 METABOLIC ACIDOSIS: Status: ACTIVE | Noted: 2021-02-05

## 2021-02-05 LAB
ALBUMIN SERPL BCP-MCNC: 2.7 G/DL (ref 3.5–5.2)
ALP SERPL-CCNC: 93 U/L (ref 55–135)
ALT SERPL W/O P-5'-P-CCNC: 13 U/L (ref 10–44)
ANION GAP SERPL CALC-SCNC: 10 MMOL/L (ref 8–16)
ANION GAP SERPL CALC-SCNC: 7 MMOL/L (ref 8–16)
AST SERPL-CCNC: 15 U/L (ref 10–40)
BASOPHILS # BLD AUTO: 0.05 K/UL (ref 0–0.2)
BASOPHILS NFR BLD: 1 % (ref 0–1.9)
BILIRUB SERPL-MCNC: 0.2 MG/DL (ref 0.1–1)
BUN SERPL-MCNC: 4 MG/DL (ref 6–20)
BUN SERPL-MCNC: 4 MG/DL (ref 6–20)
CALCIUM SERPL-MCNC: 9.1 MG/DL (ref 8.7–10.5)
CALCIUM SERPL-MCNC: 9.3 MG/DL (ref 8.7–10.5)
CHLORIDE SERPL-SCNC: 126 MMOL/L (ref 95–110)
CHLORIDE SERPL-SCNC: 127 MMOL/L (ref 95–110)
CO2 SERPL-SCNC: 9 MMOL/L (ref 23–29)
CO2 SERPL-SCNC: 9 MMOL/L (ref 23–29)
CREAT SERPL-MCNC: 0.9 MG/DL (ref 0.5–1.4)
CREAT SERPL-MCNC: 1 MG/DL (ref 0.5–1.4)
DIFFERENTIAL METHOD: ABNORMAL
EOSINOPHIL # BLD AUTO: 0.1 K/UL (ref 0–0.5)
EOSINOPHIL NFR BLD: 2.7 % (ref 0–8)
ERYTHROCYTE [DISTWIDTH] IN BLOOD BY AUTOMATED COUNT: 17.2 % (ref 11.5–14.5)
EST. GFR  (AFRICAN AMERICAN): >60 ML/MIN/1.73 M^2
EST. GFR  (AFRICAN AMERICAN): >60 ML/MIN/1.73 M^2
EST. GFR  (NON AFRICAN AMERICAN): >60 ML/MIN/1.73 M^2
EST. GFR  (NON AFRICAN AMERICAN): >60 ML/MIN/1.73 M^2
GLUCOSE SERPL-MCNC: 85 MG/DL (ref 70–110)
GLUCOSE SERPL-MCNC: 95 MG/DL (ref 70–110)
HCT VFR BLD AUTO: 40.8 % (ref 37–48.5)
HGB BLD-MCNC: 11.8 G/DL (ref 12–16)
IMM GRANULOCYTES # BLD AUTO: 0.01 K/UL (ref 0–0.04)
IMM GRANULOCYTES NFR BLD AUTO: 0.2 % (ref 0–0.5)
LACTATE SERPL-SCNC: 1.9 MMOL/L (ref 0.5–2.2)
LYMPHOCYTES # BLD AUTO: 1.6 K/UL (ref 1–4.8)
LYMPHOCYTES NFR BLD: 31.5 % (ref 18–48)
MAGNESIUM SERPL-MCNC: 2.2 MG/DL (ref 1.6–2.6)
MCH RBC QN AUTO: 28.6 PG (ref 27–31)
MCHC RBC AUTO-ENTMCNC: 28.9 G/DL (ref 32–36)
MCV RBC AUTO: 99 FL (ref 82–98)
MONOCYTES # BLD AUTO: 0.8 K/UL (ref 0.3–1)
MONOCYTES NFR BLD: 16 % (ref 4–15)
NEUTROPHILS # BLD AUTO: 2.5 K/UL (ref 1.8–7.7)
NEUTROPHILS NFR BLD: 48.6 % (ref 38–73)
NRBC BLD-RTO: 0 /100 WBC
PHOSPHATE SERPL-MCNC: 3.1 MG/DL (ref 2.7–4.5)
PLATELET # BLD AUTO: 203 K/UL (ref 150–350)
PMV BLD AUTO: 10.5 FL (ref 9.2–12.9)
POTASSIUM SERPL-SCNC: 3.1 MMOL/L (ref 3.5–5.1)
POTASSIUM SERPL-SCNC: 3.1 MMOL/L (ref 3.5–5.1)
PROT SERPL-MCNC: 5.9 G/DL (ref 6–8.4)
RBC # BLD AUTO: 4.12 M/UL (ref 4–5.4)
SODIUM SERPL-SCNC: 142 MMOL/L (ref 136–145)
SODIUM SERPL-SCNC: 146 MMOL/L (ref 136–145)
WBC # BLD AUTO: 5.18 K/UL (ref 3.9–12.7)

## 2021-02-05 PROCEDURE — 84100 ASSAY OF PHOSPHORUS: CPT | Mod: 91

## 2021-02-05 PROCEDURE — 80053 COMPREHEN METABOLIC PANEL: CPT

## 2021-02-05 PROCEDURE — 11000001 HC ACUTE MED/SURG PRIVATE ROOM

## 2021-02-05 PROCEDURE — 87425 ROTAVIRUS AG IA: CPT

## 2021-02-05 PROCEDURE — 87046 STOOL CULTR AEROBIC BACT EA: CPT

## 2021-02-05 PROCEDURE — 87045 FECES CULTURE AEROBIC BACT: CPT

## 2021-02-05 PROCEDURE — 84100 ASSAY OF PHOSPHORUS: CPT

## 2021-02-05 PROCEDURE — 99233 PR SUBSEQUENT HOSPITAL CARE,LEVL III: ICD-10-PCS | Mod: ,,, | Performed by: PHYSICIAN ASSISTANT

## 2021-02-05 PROCEDURE — 84302 ASSAY OF SWEAT SODIUM: CPT

## 2021-02-05 PROCEDURE — 87449 NOS EACH ORGANISM AG IA: CPT

## 2021-02-05 PROCEDURE — 99233 SBSQ HOSP IP/OBS HIGH 50: CPT | Mod: ,,, | Performed by: PHYSICIAN ASSISTANT

## 2021-02-05 PROCEDURE — 36415 COLL VENOUS BLD VENIPUNCTURE: CPT

## 2021-02-05 PROCEDURE — 25000003 PHARM REV CODE 250: Performed by: PHYSICIAN ASSISTANT

## 2021-02-05 PROCEDURE — 83605 ASSAY OF LACTIC ACID: CPT

## 2021-02-05 PROCEDURE — 85025 COMPLETE CBC W/AUTO DIFF WBC: CPT

## 2021-02-05 PROCEDURE — 87329 GIARDIA AG IA: CPT

## 2021-02-05 PROCEDURE — 87427 SHIGA-LIKE TOXIN AG IA: CPT

## 2021-02-05 PROCEDURE — 87324 CLOSTRIDIUM AG IA: CPT

## 2021-02-05 PROCEDURE — 83735 ASSAY OF MAGNESIUM: CPT

## 2021-02-05 PROCEDURE — 80048 BASIC METABOLIC PNL TOTAL CA: CPT

## 2021-02-05 PROCEDURE — 89055 LEUKOCYTE ASSESSMENT FECAL: CPT

## 2021-02-05 PROCEDURE — 87209 SMEAR COMPLEX STAIN: CPT

## 2021-02-05 RX ORDER — POTASSIUM CHLORIDE 20 MEQ/1
40 TABLET, EXTENDED RELEASE ORAL EVERY 4 HOURS
Status: COMPLETED | OUTPATIENT
Start: 2021-02-06 | End: 2021-02-06

## 2021-02-05 RX ORDER — SODIUM CHLORIDE, SODIUM LACTATE, POTASSIUM CHLORIDE, CALCIUM CHLORIDE 600; 310; 30; 20 MG/100ML; MG/100ML; MG/100ML; MG/100ML
INJECTION, SOLUTION INTRAVENOUS CONTINUOUS
Status: ACTIVE | OUTPATIENT
Start: 2021-02-06 | End: 2021-02-06

## 2021-02-05 RX ORDER — POTASSIUM CHLORIDE 7.45 MG/ML
10 INJECTION INTRAVENOUS
Status: COMPLETED | OUTPATIENT
Start: 2021-02-05 | End: 2021-02-06

## 2021-02-05 RX ADMIN — METOPROLOL TARTRATE 12.5 MG: 25 TABLET, FILM COATED ORAL at 09:02

## 2021-02-05 RX ADMIN — APIXABAN 5 MG: 5 TABLET, FILM COATED ORAL at 09:02

## 2021-02-05 RX ADMIN — DOCUSATE SODIUM 50MG AND SENNOSIDES 8.6MG 1 TABLET: 8.6; 5 TABLET, FILM COATED ORAL at 09:02

## 2021-02-05 RX ADMIN — GABAPENTIN 100 MG: 100 CAPSULE ORAL at 09:02

## 2021-02-05 RX ADMIN — PANTOPRAZOLE SODIUM 40 MG: 40 TABLET, DELAYED RELEASE ORAL at 09:02

## 2021-02-05 RX ADMIN — MIRTAZAPINE 15 MG: 15 TABLET, ORALLY DISINTEGRATING ORAL at 10:02

## 2021-02-06 LAB
ALBUMIN SERPL BCP-MCNC: 2.8 G/DL (ref 3.5–5.2)
ALLENS TEST: ABNORMAL
ALP SERPL-CCNC: 108 U/L (ref 55–135)
ALT SERPL W/O P-5'-P-CCNC: 13 U/L (ref 10–44)
ANION GAP SERPL CALC-SCNC: 6 MMOL/L (ref 8–16)
AST SERPL-CCNC: 18 U/L (ref 10–40)
BASOPHILS # BLD AUTO: 0.04 K/UL (ref 0–0.2)
BASOPHILS NFR BLD: 0.6 % (ref 0–1.9)
BILIRUB SERPL-MCNC: 0.3 MG/DL (ref 0.1–1)
BUN SERPL-MCNC: 8 MG/DL (ref 6–20)
C DIFF GDH STL QL: NEGATIVE
C DIFF TOX A+B STL QL IA: NEGATIVE
CALCIUM SERPL-MCNC: 9.6 MG/DL (ref 8.7–10.5)
CHLORIDE SERPL-SCNC: 127 MMOL/L (ref 95–110)
CO2 SERPL-SCNC: 12 MMOL/L (ref 23–29)
CREAT SERPL-MCNC: 1 MG/DL (ref 0.5–1.4)
DELSYS: ABNORMAL
DIFFERENTIAL METHOD: ABNORMAL
EOSINOPHIL # BLD AUTO: 0.1 K/UL (ref 0–0.5)
EOSINOPHIL NFR BLD: 1.5 % (ref 0–8)
ERYTHROCYTE [DISTWIDTH] IN BLOOD BY AUTOMATED COUNT: 17 % (ref 11.5–14.5)
EST. GFR  (AFRICAN AMERICAN): >60 ML/MIN/1.73 M^2
EST. GFR  (NON AFRICAN AMERICAN): >60 ML/MIN/1.73 M^2
FIO2: 21
GLUCOSE SERPL-MCNC: 86 MG/DL (ref 70–110)
HCO3 UR-SCNC: 14.3 MMOL/L (ref 24–28)
HCT VFR BLD AUTO: 39.2 % (ref 37–48.5)
HGB BLD-MCNC: 11.8 G/DL (ref 12–16)
IMM GRANULOCYTES # BLD AUTO: 0.02 K/UL (ref 0–0.04)
IMM GRANULOCYTES NFR BLD AUTO: 0.3 % (ref 0–0.5)
LYMPHOCYTES # BLD AUTO: 1.6 K/UL (ref 1–4.8)
LYMPHOCYTES NFR BLD: 24.7 % (ref 18–48)
MAGNESIUM SERPL-MCNC: 2.2 MG/DL (ref 1.6–2.6)
MCH RBC QN AUTO: 29.1 PG (ref 27–31)
MCHC RBC AUTO-ENTMCNC: 30.1 G/DL (ref 32–36)
MCV RBC AUTO: 97 FL (ref 82–98)
MODE: ABNORMAL
MONOCYTES # BLD AUTO: 1.2 K/UL (ref 0.3–1)
MONOCYTES NFR BLD: 17.8 % (ref 4–15)
NEUTROPHILS # BLD AUTO: 3.6 K/UL (ref 1.8–7.7)
NEUTROPHILS NFR BLD: 55.1 % (ref 38–73)
NRBC BLD-RTO: 0 /100 WBC
PCO2 BLDA: 37 MMHG (ref 35–45)
PH SMN: 7.2 [PH] (ref 7.35–7.45)
PLATELET # BLD AUTO: 219 K/UL (ref 150–350)
PMV BLD AUTO: 10.5 FL (ref 9.2–12.9)
PO2 BLDA: 95 MMHG (ref 80–100)
POC BE: -14 MMOL/L
POC SATURATED O2: 96 % (ref 95–100)
POC TCO2: 15 MMOL/L (ref 23–27)
POTASSIUM SERPL-SCNC: 3.8 MMOL/L (ref 3.5–5.1)
PROT SERPL-MCNC: 6.2 G/DL (ref 6–8.4)
RBC # BLD AUTO: 4.05 M/UL (ref 4–5.4)
RV AG STL QL IA.RAPID: NEGATIVE
SAMPLE: ABNORMAL
SITE: ABNORMAL
SODIUM SERPL-SCNC: 145 MMOL/L (ref 136–145)
SODIUM UR-SCNC: 23 MMOL/L (ref 20–250)
SP02: 99
WBC # BLD AUTO: 6.47 K/UL (ref 3.9–12.7)
WBC #/AREA STL HPF: NORMAL /[HPF]

## 2021-02-06 PROCEDURE — 25000003 PHARM REV CODE 250: Performed by: PHYSICIAN ASSISTANT

## 2021-02-06 PROCEDURE — 80053 COMPREHEN METABOLIC PANEL: CPT

## 2021-02-06 PROCEDURE — 99233 SBSQ HOSP IP/OBS HIGH 50: CPT | Mod: ,,, | Performed by: PHYSICIAN ASSISTANT

## 2021-02-06 PROCEDURE — 84300 ASSAY OF URINE SODIUM: CPT

## 2021-02-06 PROCEDURE — 11000001 HC ACUTE MED/SURG PRIVATE ROOM

## 2021-02-06 PROCEDURE — 36600 WITHDRAWAL OF ARTERIAL BLOOD: CPT

## 2021-02-06 PROCEDURE — 82803 BLOOD GASES ANY COMBINATION: CPT

## 2021-02-06 PROCEDURE — 99233 PR SUBSEQUENT HOSPITAL CARE,LEVL III: ICD-10-PCS | Mod: ,,, | Performed by: PHYSICIAN ASSISTANT

## 2021-02-06 PROCEDURE — 83735 ASSAY OF MAGNESIUM: CPT

## 2021-02-06 PROCEDURE — 63600175 PHARM REV CODE 636 W HCPCS: Performed by: PHYSICIAN ASSISTANT

## 2021-02-06 PROCEDURE — 84302 ASSAY OF SWEAT SODIUM: CPT

## 2021-02-06 PROCEDURE — 36415 COLL VENOUS BLD VENIPUNCTURE: CPT

## 2021-02-06 PROCEDURE — 85025 COMPLETE CBC W/AUTO DIFF WBC: CPT

## 2021-02-06 RX ORDER — SIMETHICONE 80 MG
1 TABLET,CHEWABLE ORAL 3 TIMES DAILY PRN
Status: DISCONTINUED | OUTPATIENT
Start: 2021-02-06 | End: 2021-02-10 | Stop reason: HOSPADM

## 2021-02-06 RX ADMIN — APIXABAN 5 MG: 5 TABLET, FILM COATED ORAL at 09:02

## 2021-02-06 RX ADMIN — POTASSIUM CHLORIDE 40 MEQ: 1500 TABLET, EXTENDED RELEASE ORAL at 05:02

## 2021-02-06 RX ADMIN — POTASSIUM CHLORIDE 10 MEQ: 7.46 INJECTION, SOLUTION INTRAVENOUS at 12:02

## 2021-02-06 RX ADMIN — METOPROLOL TARTRATE 12.5 MG: 25 TABLET, FILM COATED ORAL at 10:02

## 2021-02-06 RX ADMIN — SODIUM BICARBONATE: 84 INJECTION, SOLUTION INTRAVENOUS at 03:02

## 2021-02-06 RX ADMIN — MIRTAZAPINE 15 MG: 15 TABLET, ORALLY DISINTEGRATING ORAL at 09:02

## 2021-02-06 RX ADMIN — GABAPENTIN 100 MG: 100 CAPSULE ORAL at 09:02

## 2021-02-06 RX ADMIN — PANTOPRAZOLE SODIUM 40 MG: 40 TABLET, DELAYED RELEASE ORAL at 10:02

## 2021-02-06 RX ADMIN — SODIUM CHLORIDE, SODIUM LACTATE, POTASSIUM CHLORIDE, AND CALCIUM CHLORIDE: .6; .31; .03; .02 INJECTION, SOLUTION INTRAVENOUS at 12:02

## 2021-02-06 RX ADMIN — POTASSIUM CHLORIDE 40 MEQ: 1500 TABLET, EXTENDED RELEASE ORAL at 02:02

## 2021-02-06 RX ADMIN — SIMETHICONE CHEW TAB 80 MG 80 MG: 80 TABLET ORAL at 10:02

## 2021-02-06 RX ADMIN — APIXABAN 5 MG: 5 TABLET, FILM COATED ORAL at 10:02

## 2021-02-07 PROBLEM — E87.0 HYPERNATREMIA: Status: ACTIVE | Noted: 2021-02-07

## 2021-02-07 LAB
ALBUMIN SERPL BCP-MCNC: 2.5 G/DL (ref 3.5–5.2)
ALLENS TEST: ABNORMAL
ALP SERPL-CCNC: 110 U/L (ref 55–135)
ALT SERPL W/O P-5'-P-CCNC: 9 U/L (ref 10–44)
ANION GAP SERPL CALC-SCNC: 7 MMOL/L (ref 8–16)
ANION GAP SERPL CALC-SCNC: 7 MMOL/L (ref 8–16)
ANION GAP SERPL CALC-SCNC: 8 MMOL/L (ref 8–16)
AST SERPL-CCNC: 12 U/L (ref 10–40)
BASOPHILS # BLD AUTO: 0.03 K/UL (ref 0–0.2)
BASOPHILS NFR BLD: 0.5 % (ref 0–1.9)
BILIRUB SERPL-MCNC: 0.1 MG/DL (ref 0.1–1)
BUN SERPL-MCNC: 11 MG/DL (ref 6–20)
BUN SERPL-MCNC: 12 MG/DL (ref 6–20)
BUN SERPL-MCNC: 12 MG/DL (ref 6–20)
CALCIUM SERPL-MCNC: 8.5 MG/DL (ref 8.7–10.5)
CALCIUM SERPL-MCNC: 8.9 MG/DL (ref 8.7–10.5)
CALCIUM SERPL-MCNC: 9 MG/DL (ref 8.7–10.5)
CHLORIDE SERPL-SCNC: 120 MMOL/L (ref 95–110)
CHLORIDE SERPL-SCNC: 121 MMOL/L (ref 95–110)
CHLORIDE SERPL-SCNC: 123 MMOL/L (ref 95–110)
CO2 SERPL-SCNC: 17 MMOL/L (ref 23–29)
CO2 SERPL-SCNC: 19 MMOL/L (ref 23–29)
CO2 SERPL-SCNC: 19 MMOL/L (ref 23–29)
CREAT SERPL-MCNC: 1 MG/DL (ref 0.5–1.4)
CREAT SERPL-MCNC: 1.2 MG/DL (ref 0.5–1.4)
CREAT SERPL-MCNC: 1.2 MG/DL (ref 0.5–1.4)
CRYPTOSP AG STL QL IA: NEGATIVE
DELSYS: ABNORMAL
DIFFERENTIAL METHOD: ABNORMAL
EOSINOPHIL # BLD AUTO: 0.2 K/UL (ref 0–0.5)
EOSINOPHIL NFR BLD: 2.6 % (ref 0–8)
ERYTHROCYTE [DISTWIDTH] IN BLOOD BY AUTOMATED COUNT: 17.1 % (ref 11.5–14.5)
EST. GFR  (AFRICAN AMERICAN): 57.6 ML/MIN/1.73 M^2
EST. GFR  (AFRICAN AMERICAN): 57.6 ML/MIN/1.73 M^2
EST. GFR  (AFRICAN AMERICAN): >60 ML/MIN/1.73 M^2
EST. GFR  (NON AFRICAN AMERICAN): 49.9 ML/MIN/1.73 M^2
EST. GFR  (NON AFRICAN AMERICAN): 49.9 ML/MIN/1.73 M^2
EST. GFR  (NON AFRICAN AMERICAN): >60 ML/MIN/1.73 M^2
G LAMBLIA AG STL QL IA: NEGATIVE
GLUCOSE SERPL-MCNC: 108 MG/DL (ref 70–110)
GLUCOSE SERPL-MCNC: 90 MG/DL (ref 70–110)
GLUCOSE SERPL-MCNC: 94 MG/DL (ref 70–110)
HCO3 UR-SCNC: 18.5 MMOL/L (ref 24–28)
HCT VFR BLD AUTO: 36.2 % (ref 37–48.5)
HGB BLD-MCNC: 10.9 G/DL (ref 12–16)
IMM GRANULOCYTES # BLD AUTO: 0.02 K/UL (ref 0–0.04)
IMM GRANULOCYTES NFR BLD AUTO: 0.3 % (ref 0–0.5)
LYMPHOCYTES # BLD AUTO: 1.5 K/UL (ref 1–4.8)
LYMPHOCYTES NFR BLD: 23.5 % (ref 18–48)
MAGNESIUM SERPL-MCNC: 1.9 MG/DL (ref 1.6–2.6)
MCH RBC QN AUTO: 28.7 PG (ref 27–31)
MCHC RBC AUTO-ENTMCNC: 30.1 G/DL (ref 32–36)
MCV RBC AUTO: 95 FL (ref 82–98)
MONOCYTES # BLD AUTO: 1.1 K/UL (ref 0.3–1)
MONOCYTES NFR BLD: 16.9 % (ref 4–15)
NEUTROPHILS # BLD AUTO: 3.6 K/UL (ref 1.8–7.7)
NEUTROPHILS NFR BLD: 56.2 % (ref 38–73)
NRBC BLD-RTO: 0 /100 WBC
PCO2 BLDA: 41.7 MMHG (ref 35–45)
PH SMN: 7.26 [PH] (ref 7.35–7.45)
PLATELET # BLD AUTO: 224 K/UL (ref 150–350)
PMV BLD AUTO: 10.3 FL (ref 9.2–12.9)
PO2 BLDA: 100 MMHG (ref 80–100)
POC BE: -9 MMOL/L
POC SATURATED O2: 97 % (ref 95–100)
POC TCO2: 20 MMOL/L (ref 23–27)
POTASSIUM SERPL-SCNC: 3.1 MMOL/L (ref 3.5–5.1)
POTASSIUM SERPL-SCNC: 3.2 MMOL/L (ref 3.5–5.1)
POTASSIUM SERPL-SCNC: 3.4 MMOL/L (ref 3.5–5.1)
PROT SERPL-MCNC: 5.5 G/DL (ref 6–8.4)
RBC # BLD AUTO: 3.8 M/UL (ref 4–5.4)
SAMPLE: ABNORMAL
SITE: ABNORMAL
SODIUM SERPL-SCNC: 147 MMOL/L (ref 136–145)
WBC # BLD AUTO: 6.46 K/UL (ref 3.9–12.7)

## 2021-02-07 PROCEDURE — 99233 SBSQ HOSP IP/OBS HIGH 50: CPT | Mod: ,,, | Performed by: PHYSICIAN ASSISTANT

## 2021-02-07 PROCEDURE — 25000003 PHARM REV CODE 250: Performed by: PHYSICIAN ASSISTANT

## 2021-02-07 PROCEDURE — 97161 PT EVAL LOW COMPLEX 20 MIN: CPT

## 2021-02-07 PROCEDURE — 97530 THERAPEUTIC ACTIVITIES: CPT

## 2021-02-07 PROCEDURE — 97165 OT EVAL LOW COMPLEX 30 MIN: CPT

## 2021-02-07 PROCEDURE — 97535 SELF CARE MNGMENT TRAINING: CPT

## 2021-02-07 PROCEDURE — 80048 BASIC METABOLIC PNL TOTAL CA: CPT

## 2021-02-07 PROCEDURE — 36415 COLL VENOUS BLD VENIPUNCTURE: CPT

## 2021-02-07 PROCEDURE — 63600175 PHARM REV CODE 636 W HCPCS: Performed by: PHYSICIAN ASSISTANT

## 2021-02-07 PROCEDURE — 85025 COMPLETE CBC W/AUTO DIFF WBC: CPT

## 2021-02-07 PROCEDURE — 99233 PR SUBSEQUENT HOSPITAL CARE,LEVL III: ICD-10-PCS | Mod: ,,, | Performed by: PHYSICIAN ASSISTANT

## 2021-02-07 PROCEDURE — 11000001 HC ACUTE MED/SURG PRIVATE ROOM

## 2021-02-07 PROCEDURE — 80053 COMPREHEN METABOLIC PANEL: CPT

## 2021-02-07 PROCEDURE — 83735 ASSAY OF MAGNESIUM: CPT

## 2021-02-07 RX ORDER — POTASSIUM CHLORIDE 20 MEQ/1
40 TABLET, EXTENDED RELEASE ORAL EVERY 4 HOURS
Status: COMPLETED | OUTPATIENT
Start: 2021-02-07 | End: 2021-02-07

## 2021-02-07 RX ORDER — POTASSIUM CHLORIDE 20 MEQ/1
20 TABLET, EXTENDED RELEASE ORAL DAILY
Status: DISCONTINUED | OUTPATIENT
Start: 2021-02-08 | End: 2021-02-09

## 2021-02-07 RX ORDER — SODIUM CHLORIDE, SODIUM LACTATE, POTASSIUM CHLORIDE, CALCIUM CHLORIDE 600; 310; 30; 20 MG/100ML; MG/100ML; MG/100ML; MG/100ML
INJECTION, SOLUTION INTRAVENOUS CONTINUOUS
Status: ACTIVE | OUTPATIENT
Start: 2021-02-07 | End: 2021-02-08

## 2021-02-07 RX ORDER — DEXTROSE MONOHYDRATE AND SODIUM CHLORIDE 5; .45 G/100ML; G/100ML
INJECTION, SOLUTION INTRAVENOUS CONTINUOUS
Status: DISCONTINUED | OUTPATIENT
Start: 2021-02-07 | End: 2021-02-07

## 2021-02-07 RX ORDER — DEXTROSE MONOHYDRATE, SODIUM CHLORIDE, AND POTASSIUM CHLORIDE 50; 1.49; 4.5 G/1000ML; G/1000ML; G/1000ML
INJECTION, SOLUTION INTRAVENOUS CONTINUOUS
Status: DISCONTINUED | OUTPATIENT
Start: 2021-02-07 | End: 2021-02-07

## 2021-02-07 RX ADMIN — APIXABAN 5 MG: 5 TABLET, FILM COATED ORAL at 09:02

## 2021-02-07 RX ADMIN — POTASSIUM CHLORIDE 40 MEQ: 1500 TABLET, EXTENDED RELEASE ORAL at 09:02

## 2021-02-07 RX ADMIN — SODIUM CHLORIDE, SODIUM LACTATE, POTASSIUM CHLORIDE, AND CALCIUM CHLORIDE: .6; .31; .03; .02 INJECTION, SOLUTION INTRAVENOUS at 05:02

## 2021-02-07 RX ADMIN — POTASSIUM CHLORIDE 40 MEQ: 1500 TABLET, EXTENDED RELEASE ORAL at 12:02

## 2021-02-07 RX ADMIN — GABAPENTIN 100 MG: 100 CAPSULE ORAL at 08:02

## 2021-02-07 RX ADMIN — MIRTAZAPINE 15 MG: 15 TABLET, ORALLY DISINTEGRATING ORAL at 09:02

## 2021-02-07 RX ADMIN — PANTOPRAZOLE SODIUM 40 MG: 40 TABLET, DELAYED RELEASE ORAL at 09:02

## 2021-02-07 RX ADMIN — APIXABAN 5 MG: 5 TABLET, FILM COATED ORAL at 08:02

## 2021-02-07 RX ADMIN — METOPROLOL TARTRATE 12.5 MG: 25 TABLET, FILM COATED ORAL at 09:02

## 2021-02-07 RX ADMIN — SODIUM BICARBONATE: 84 INJECTION, SOLUTION INTRAVENOUS at 06:02

## 2021-02-08 PROBLEM — N30.00 ACUTE CYSTITIS WITHOUT HEMATURIA: Status: ACTIVE | Noted: 2021-02-08

## 2021-02-08 PROBLEM — Z93.6 PRESENCE OF UROSTOMY: Status: ACTIVE | Noted: 2021-02-08

## 2021-02-08 LAB
ALBUMIN SERPL BCP-MCNC: 2.2 G/DL (ref 3.5–5.2)
ALP SERPL-CCNC: 113 U/L (ref 55–135)
ALT SERPL W/O P-5'-P-CCNC: 8 U/L (ref 10–44)
AMORPH CRY UR QL COMP ASSIST: ABNORMAL
ANION GAP SERPL CALC-SCNC: 6 MMOL/L (ref 8–16)
ANION GAP SERPL CALC-SCNC: 7 MMOL/L (ref 8–16)
AST SERPL-CCNC: 10 U/L (ref 10–40)
BACTERIA #/AREA URNS AUTO: ABNORMAL /HPF
BACTERIA STL CULT: NORMAL
BASOPHILS # BLD AUTO: 0.04 K/UL (ref 0–0.2)
BASOPHILS NFR BLD: 0.7 % (ref 0–1.9)
BILIRUB SERPL-MCNC: 0.2 MG/DL (ref 0.1–1)
BILIRUB UR QL STRIP: NEGATIVE
BUN SERPL-MCNC: 13 MG/DL (ref 6–20)
BUN SERPL-MCNC: 14 MG/DL (ref 6–20)
CALCIUM SERPL-MCNC: 8.3 MG/DL (ref 8.7–10.5)
CALCIUM SERPL-MCNC: 8.3 MG/DL (ref 8.7–10.5)
CHLORIDE SERPL-SCNC: 118 MMOL/L (ref 95–110)
CHLORIDE SERPL-SCNC: 119 MMOL/L (ref 95–110)
CLARITY UR REFRACT.AUTO: ABNORMAL
CO2 SERPL-SCNC: 21 MMOL/L (ref 23–29)
CO2 SERPL-SCNC: 22 MMOL/L (ref 23–29)
COLOR UR AUTO: YELLOW
CREAT SERPL-MCNC: 1 MG/DL (ref 0.5–1.4)
CREAT SERPL-MCNC: 1 MG/DL (ref 0.5–1.4)
DIFFERENTIAL METHOD: ABNORMAL
E COLI SXT1 STL QL IA: NEGATIVE
E COLI SXT2 STL QL IA: NEGATIVE
EOSINOPHIL # BLD AUTO: 0.2 K/UL (ref 0–0.5)
EOSINOPHIL NFR BLD: 3 % (ref 0–8)
ERYTHROCYTE [DISTWIDTH] IN BLOOD BY AUTOMATED COUNT: 16.6 % (ref 11.5–14.5)
EST. GFR  (AFRICAN AMERICAN): >60 ML/MIN/1.73 M^2
EST. GFR  (AFRICAN AMERICAN): >60 ML/MIN/1.73 M^2
EST. GFR  (NON AFRICAN AMERICAN): >60 ML/MIN/1.73 M^2
EST. GFR  (NON AFRICAN AMERICAN): >60 ML/MIN/1.73 M^2
GLUCOSE SERPL-MCNC: 102 MG/DL (ref 70–110)
GLUCOSE SERPL-MCNC: 127 MG/DL (ref 70–110)
GLUCOSE UR QL STRIP: NEGATIVE
GRAN CASTS UR QL COMP ASSIST: 12 /LPF
HCT VFR BLD AUTO: 35.6 % (ref 37–48.5)
HGB BLD-MCNC: 10.6 G/DL (ref 12–16)
HGB UR QL STRIP: ABNORMAL
HYALINE CASTS UR QL AUTO: 0 /LPF
IMM GRANULOCYTES # BLD AUTO: 0.02 K/UL (ref 0–0.04)
IMM GRANULOCYTES NFR BLD AUTO: 0.3 % (ref 0–0.5)
KETONES UR QL STRIP: NEGATIVE
LEUKOCYTE ESTERASE UR QL STRIP: ABNORMAL
LYMPHOCYTES # BLD AUTO: 1.5 K/UL (ref 1–4.8)
LYMPHOCYTES NFR BLD: 24.5 % (ref 18–48)
MAGNESIUM SERPL-MCNC: 1.7 MG/DL (ref 1.6–2.6)
MCH RBC QN AUTO: 29 PG (ref 27–31)
MCHC RBC AUTO-ENTMCNC: 29.8 G/DL (ref 32–36)
MCV RBC AUTO: 98 FL (ref 82–98)
MICROSCOPIC COMMENT: ABNORMAL
MONOCYTES # BLD AUTO: 1 K/UL (ref 0.3–1)
MONOCYTES NFR BLD: 15.9 % (ref 4–15)
NEUTROPHILS # BLD AUTO: 3.4 K/UL (ref 1.8–7.7)
NEUTROPHILS NFR BLD: 55.6 % (ref 38–73)
NITRITE UR QL STRIP: NEGATIVE
NRBC BLD-RTO: 0 /100 WBC
PH UR STRIP: 8 [PH] (ref 5–8)
PLATELET # BLD AUTO: 204 K/UL (ref 150–350)
PMV BLD AUTO: 11.2 FL (ref 9.2–12.9)
POCT GLUCOSE: 90 MG/DL (ref 70–110)
POTASSIUM SERPL-SCNC: 3 MMOL/L (ref 3.5–5.1)
POTASSIUM SERPL-SCNC: 3 MMOL/L (ref 3.5–5.1)
PROT SERPL-MCNC: 5.1 G/DL (ref 6–8.4)
PROT UR QL STRIP: ABNORMAL
RBC # BLD AUTO: 3.65 M/UL (ref 4–5.4)
RBC #/AREA URNS AUTO: 6 /HPF (ref 0–4)
SODIUM SERPL-SCNC: 146 MMOL/L (ref 136–145)
SODIUM SERPL-SCNC: 147 MMOL/L (ref 136–145)
SP GR UR STRIP: 1.01 (ref 1–1.03)
SQUAMOUS #/AREA URNS AUTO: 1 /HPF
URN SPEC COLLECT METH UR: ABNORMAL
WBC # BLD AUTO: 6.05 K/UL (ref 3.9–12.7)
WBC #/AREA URNS AUTO: 31 /HPF (ref 0–5)

## 2021-02-08 PROCEDURE — 63600175 PHARM REV CODE 636 W HCPCS: Performed by: PHYSICIAN ASSISTANT

## 2021-02-08 PROCEDURE — 85025 COMPLETE CBC W/AUTO DIFF WBC: CPT

## 2021-02-08 PROCEDURE — 87205 SMEAR GRAM STAIN: CPT

## 2021-02-08 PROCEDURE — 87086 URINE CULTURE/COLONY COUNT: CPT

## 2021-02-08 PROCEDURE — 80053 COMPREHEN METABOLIC PANEL: CPT

## 2021-02-08 PROCEDURE — 25000003 PHARM REV CODE 250: Performed by: PHYSICIAN ASSISTANT

## 2021-02-08 PROCEDURE — 80048 BASIC METABOLIC PNL TOTAL CA: CPT

## 2021-02-08 PROCEDURE — 99233 PR SUBSEQUENT HOSPITAL CARE,LEVL III: ICD-10-PCS | Mod: ,,, | Performed by: PHYSICIAN ASSISTANT

## 2021-02-08 PROCEDURE — 36415 COLL VENOUS BLD VENIPUNCTURE: CPT

## 2021-02-08 PROCEDURE — 81001 URINALYSIS AUTO W/SCOPE: CPT

## 2021-02-08 PROCEDURE — 11000001 HC ACUTE MED/SURG PRIVATE ROOM

## 2021-02-08 PROCEDURE — 99233 SBSQ HOSP IP/OBS HIGH 50: CPT | Mod: ,,, | Performed by: PHYSICIAN ASSISTANT

## 2021-02-08 PROCEDURE — 83735 ASSAY OF MAGNESIUM: CPT

## 2021-02-08 RX ORDER — DEXTROSE MONOHYDRATE, SODIUM CHLORIDE, AND POTASSIUM CHLORIDE 50; 1.49; 4.5 G/1000ML; G/1000ML; G/1000ML
INJECTION, SOLUTION INTRAVENOUS CONTINUOUS
Status: DISCONTINUED | OUTPATIENT
Start: 2021-02-08 | End: 2021-02-09

## 2021-02-08 RX ORDER — POTASSIUM CHLORIDE 20 MEQ/1
40 TABLET, EXTENDED RELEASE ORAL ONCE
Status: COMPLETED | OUTPATIENT
Start: 2021-02-08 | End: 2021-02-08

## 2021-02-08 RX ORDER — CEFTRIAXONE 1 G/1
1 INJECTION, POWDER, FOR SOLUTION INTRAMUSCULAR; INTRAVENOUS
Status: DISCONTINUED | OUTPATIENT
Start: 2021-02-08 | End: 2021-02-09

## 2021-02-08 RX ADMIN — POTASSIUM CHLORIDE 20 MEQ: 1500 TABLET, EXTENDED RELEASE ORAL at 08:02

## 2021-02-08 RX ADMIN — SODIUM BICARBONATE: 84 INJECTION, SOLUTION INTRAVENOUS at 11:02

## 2021-02-08 RX ADMIN — APIXABAN 5 MG: 5 TABLET, FILM COATED ORAL at 09:02

## 2021-02-08 RX ADMIN — MIRTAZAPINE 15 MG: 15 TABLET, ORALLY DISINTEGRATING ORAL at 10:02

## 2021-02-08 RX ADMIN — APIXABAN 5 MG: 5 TABLET, FILM COATED ORAL at 08:02

## 2021-02-08 RX ADMIN — POTASSIUM CHLORIDE, DEXTROSE MONOHYDRATE AND SODIUM CHLORIDE: 150; 5; 450 INJECTION, SOLUTION INTRAVENOUS at 08:02

## 2021-02-08 RX ADMIN — SODIUM BICARBONATE: 84 INJECTION, SOLUTION INTRAVENOUS at 07:02

## 2021-02-08 RX ADMIN — POTASSIUM CHLORIDE 40 MEQ: 1500 TABLET, EXTENDED RELEASE ORAL at 04:02

## 2021-02-08 RX ADMIN — GABAPENTIN 100 MG: 100 CAPSULE ORAL at 09:02

## 2021-02-08 RX ADMIN — CEFTRIAXONE SODIUM 1 G: 1 INJECTION, POWDER, FOR SOLUTION INTRAMUSCULAR; INTRAVENOUS at 11:02

## 2021-02-08 RX ADMIN — POTASSIUM CHLORIDE, DEXTROSE MONOHYDRATE AND SODIUM CHLORIDE: 150; 5; 450 INJECTION, SOLUTION INTRAVENOUS at 10:02

## 2021-02-08 RX ADMIN — METOPROLOL TARTRATE 12.5 MG: 25 TABLET, FILM COATED ORAL at 08:02

## 2021-02-08 RX ADMIN — SODIUM BICARBONATE: 84 INJECTION, SOLUTION INTRAVENOUS at 10:02

## 2021-02-08 RX ADMIN — PANTOPRAZOLE SODIUM 40 MG: 40 TABLET, DELAYED RELEASE ORAL at 08:02

## 2021-02-09 PROBLEM — R82.71 BACTERIURIA: Status: ACTIVE | Noted: 2021-02-08

## 2021-02-09 LAB
ALBUMIN SERPL BCP-MCNC: 2.1 G/DL (ref 3.5–5.2)
ALP SERPL-CCNC: 117 U/L (ref 55–135)
ALT SERPL W/O P-5'-P-CCNC: 7 U/L (ref 10–44)
ANION GAP SERPL CALC-SCNC: 5 MMOL/L (ref 8–16)
ANION GAP SERPL CALC-SCNC: 8 MMOL/L (ref 8–16)
AST SERPL-CCNC: 13 U/L (ref 10–40)
BACTERIA UR CULT: NORMAL
BACTERIA UR CULT: NORMAL
BILIRUB SERPL-MCNC: 0.1 MG/DL (ref 0.1–1)
BUN SERPL-MCNC: 12 MG/DL (ref 6–20)
BUN SERPL-MCNC: 9 MG/DL (ref 6–20)
CALCIUM SERPL-MCNC: 7.9 MG/DL (ref 8.7–10.5)
CALCIUM SERPL-MCNC: 7.9 MG/DL (ref 8.7–10.5)
CHLORIDE SERPL-SCNC: 109 MMOL/L (ref 95–110)
CHLORIDE SERPL-SCNC: 112 MMOL/L (ref 95–110)
CO2 SERPL-SCNC: 24 MMOL/L (ref 23–29)
CO2 SERPL-SCNC: 28 MMOL/L (ref 23–29)
CREAT SERPL-MCNC: 0.8 MG/DL (ref 0.5–1.4)
CREAT SERPL-MCNC: 0.9 MG/DL (ref 0.5–1.4)
EST. GFR  (AFRICAN AMERICAN): >60 ML/MIN/1.73 M^2
EST. GFR  (AFRICAN AMERICAN): >60 ML/MIN/1.73 M^2
EST. GFR  (NON AFRICAN AMERICAN): >60 ML/MIN/1.73 M^2
EST. GFR  (NON AFRICAN AMERICAN): >60 ML/MIN/1.73 M^2
GLUCOSE SERPL-MCNC: 123 MG/DL (ref 70–110)
GLUCOSE SERPL-MCNC: 141 MG/DL (ref 70–110)
GRAM STN SPEC: NORMAL
MAGNESIUM SERPL-MCNC: 1.5 MG/DL (ref 1.6–2.6)
POTASSIUM SERPL-SCNC: 3.1 MMOL/L (ref 3.5–5.1)
POTASSIUM SERPL-SCNC: 3.3 MMOL/L (ref 3.5–5.1)
PROT SERPL-MCNC: 4.9 G/DL (ref 6–8.4)
SODIUM SERPL-SCNC: 142 MMOL/L (ref 136–145)
SODIUM SERPL-SCNC: 144 MMOL/L (ref 136–145)

## 2021-02-09 PROCEDURE — 99233 PR SUBSEQUENT HOSPITAL CARE,LEVL III: ICD-10-PCS | Mod: ,,, | Performed by: PHYSICIAN ASSISTANT

## 2021-02-09 PROCEDURE — 80053 COMPREHEN METABOLIC PANEL: CPT

## 2021-02-09 PROCEDURE — 25000242 PHARM REV CODE 250 ALT 637 W/ HCPCS: Performed by: PHYSICIAN ASSISTANT

## 2021-02-09 PROCEDURE — 11000001 HC ACUTE MED/SURG PRIVATE ROOM

## 2021-02-09 PROCEDURE — 25000003 PHARM REV CODE 250: Performed by: PHYSICIAN ASSISTANT

## 2021-02-09 PROCEDURE — 99233 SBSQ HOSP IP/OBS HIGH 50: CPT | Mod: ,,, | Performed by: PHYSICIAN ASSISTANT

## 2021-02-09 PROCEDURE — 63600175 PHARM REV CODE 636 W HCPCS: Performed by: PHYSICIAN ASSISTANT

## 2021-02-09 PROCEDURE — 80048 BASIC METABOLIC PNL TOTAL CA: CPT

## 2021-02-09 PROCEDURE — 83735 ASSAY OF MAGNESIUM: CPT

## 2021-02-09 PROCEDURE — 36415 COLL VENOUS BLD VENIPUNCTURE: CPT

## 2021-02-09 RX ORDER — DEXTROSE MONOHYDRATE, SODIUM CHLORIDE, AND POTASSIUM CHLORIDE 50; 1.49; 4.5 G/1000ML; G/1000ML; G/1000ML
INJECTION, SOLUTION INTRAVENOUS CONTINUOUS
Status: ACTIVE | OUTPATIENT
Start: 2021-02-09 | End: 2021-02-09

## 2021-02-09 RX ORDER — POTASSIUM CHLORIDE 20 MEQ/1
40 TABLET, EXTENDED RELEASE ORAL EVERY 4 HOURS
Status: COMPLETED | OUTPATIENT
Start: 2021-02-09 | End: 2021-02-09

## 2021-02-09 RX ORDER — MAGNESIUM SULFATE HEPTAHYDRATE 40 MG/ML
2 INJECTION, SOLUTION INTRAVENOUS
Status: DISCONTINUED | OUTPATIENT
Start: 2021-02-09 | End: 2021-02-09

## 2021-02-09 RX ORDER — DEXTROSE MONOHYDRATE, SODIUM CHLORIDE, AND POTASSIUM CHLORIDE 50; 1.49; 4.5 G/1000ML; G/1000ML; G/1000ML
INJECTION, SOLUTION INTRAVENOUS CONTINUOUS
Status: DISCONTINUED | OUTPATIENT
Start: 2021-02-09 | End: 2021-02-09

## 2021-02-09 RX ORDER — MAGNESIUM SULFATE HEPTAHYDRATE 40 MG/ML
2 INJECTION, SOLUTION INTRAVENOUS
Status: COMPLETED | OUTPATIENT
Start: 2021-02-09 | End: 2021-02-09

## 2021-02-09 RX ORDER — POTASSIUM CHLORIDE 20 MEQ/1
20 TABLET, EXTENDED RELEASE ORAL DAILY
Status: DISCONTINUED | OUTPATIENT
Start: 2021-02-10 | End: 2021-02-10 | Stop reason: HOSPADM

## 2021-02-09 RX ADMIN — POTASSIUM CHLORIDE 40 MEQ: 1500 TABLET, EXTENDED RELEASE ORAL at 05:02

## 2021-02-09 RX ADMIN — DEXTROSE MONOHYDRATE, SODIUM CHLORIDE, AND POTASSIUM CHLORIDE: 50; 4.5; 1.49 INJECTION, SOLUTION INTRAVENOUS at 05:02

## 2021-02-09 RX ADMIN — MIRTAZAPINE 15 MG: 15 TABLET, ORALLY DISINTEGRATING ORAL at 09:02

## 2021-02-09 RX ADMIN — MAGNESIUM SULFATE 2 G: 2 INJECTION INTRAVENOUS at 09:02

## 2021-02-09 RX ADMIN — METOPROLOL TARTRATE 12.5 MG: 25 TABLET, FILM COATED ORAL at 08:02

## 2021-02-09 RX ADMIN — APIXABAN 5 MG: 5 TABLET, FILM COATED ORAL at 09:02

## 2021-02-09 RX ADMIN — POTASSIUM CHLORIDE 40 MEQ: 1500 TABLET, EXTENDED RELEASE ORAL at 09:02

## 2021-02-09 RX ADMIN — PANTOPRAZOLE SODIUM 40 MG: 40 TABLET, DELAYED RELEASE ORAL at 08:02

## 2021-02-09 RX ADMIN — POTASSIUM CHLORIDE, DEXTROSE MONOHYDRATE AND SODIUM CHLORIDE 100 ML/HR: 150; 5; 450 INJECTION, SOLUTION INTRAVENOUS at 12:02

## 2021-02-09 RX ADMIN — APIXABAN 5 MG: 5 TABLET, FILM COATED ORAL at 08:02

## 2021-02-09 RX ADMIN — CEFTRIAXONE SODIUM 1 G: 1 INJECTION, POWDER, FOR SOLUTION INTRAMUSCULAR; INTRAVENOUS at 08:02

## 2021-02-09 RX ADMIN — MAGNESIUM SULFATE 2 G: 2 INJECTION INTRAVENOUS at 08:02

## 2021-02-09 RX ADMIN — GABAPENTIN 100 MG: 100 CAPSULE ORAL at 09:02

## 2021-02-10 VITALS
SYSTOLIC BLOOD PRESSURE: 109 MMHG | BODY MASS INDEX: 21.49 KG/M2 | RESPIRATION RATE: 18 BRPM | DIASTOLIC BLOOD PRESSURE: 56 MMHG | TEMPERATURE: 98 F | WEIGHT: 145.06 LBS | OXYGEN SATURATION: 100 % | HEIGHT: 69 IN | HEART RATE: 91 BPM

## 2021-02-10 LAB
ALBUMIN SERPL BCP-MCNC: 2.1 G/DL (ref 3.5–5.2)
ALP SERPL-CCNC: 114 U/L (ref 55–135)
ALT SERPL W/O P-5'-P-CCNC: 12 U/L (ref 10–44)
ANION GAP SERPL CALC-SCNC: 5 MMOL/L (ref 8–16)
AST SERPL-CCNC: 24 U/L (ref 10–40)
BILIRUB SERPL-MCNC: 0.1 MG/DL (ref 0.1–1)
BUN SERPL-MCNC: 11 MG/DL (ref 6–20)
CALCIUM SERPL-MCNC: 8.1 MG/DL (ref 8.7–10.5)
CHLORIDE SERPL-SCNC: 115 MMOL/L (ref 95–110)
CHLORIDE STL-SCNC: 124 MMOL/L
CHLORIDE STL-SCNC: 93 MMOL/L
CO2 SERPL-SCNC: 24 MMOL/L (ref 23–29)
CREAT SERPL-MCNC: 0.9 MG/DL (ref 0.5–1.4)
EST. GFR  (AFRICAN AMERICAN): >60 ML/MIN/1.73 M^2
EST. GFR  (NON AFRICAN AMERICAN): >60 ML/MIN/1.73 M^2
GLUCOSE SERPL-MCNC: 100 MG/DL (ref 70–110)
MAGNESIUM FLD-MCNC: 13 MG/DL
MAGNESIUM FLD-MCNC: 40 MG/DL
MAGNESIUM SERPL-MCNC: 2.1 MG/DL (ref 1.6–2.6)
O+P STL MICRO: NORMAL
OSMOL GAP STL: -46 MOSM/KG
OSMOL GAP STL: 36 MOSM/KG
OSMOLALITY STL: 406 MOSM/KG
OSMOLALITY STL: 418 MOSM/KG
PHOSPHORUS, FECES: 23 MG/DL
PHOSPHORUS, FECES: 38 MG/DL
POTASSIUM FLD-SCNC: 36 MMOL/L
POTASSIUM FLD-SCNC: 7 MMOL/L
POTASSIUM SERPL-SCNC: 4.4 MMOL/L (ref 3.5–5.1)
PROT SERPL-MCNC: 5.1 G/DL (ref 6–8.4)
SODIUM SERPL-SCNC: 144 MMOL/L (ref 136–145)
SODIUM STL-SCNC: 120 MMOL/L
SODIUM STL-SCNC: 132 MMOL/L

## 2021-02-10 PROCEDURE — 99239 PR HOSPITAL DISCHARGE DAY,>30 MIN: ICD-10-PCS | Mod: ,,, | Performed by: PHYSICIAN ASSISTANT

## 2021-02-10 PROCEDURE — 83735 ASSAY OF MAGNESIUM: CPT

## 2021-02-10 PROCEDURE — 25000003 PHARM REV CODE 250: Performed by: PHYSICIAN ASSISTANT

## 2021-02-10 PROCEDURE — 36415 COLL VENOUS BLD VENIPUNCTURE: CPT

## 2021-02-10 PROCEDURE — 99239 HOSP IP/OBS DSCHRG MGMT >30: CPT | Mod: ,,, | Performed by: PHYSICIAN ASSISTANT

## 2021-02-10 PROCEDURE — 80053 COMPREHEN METABOLIC PANEL: CPT

## 2021-02-10 RX ORDER — POTASSIUM CHLORIDE 20 MEQ/1
20 TABLET, EXTENDED RELEASE ORAL DAILY
Qty: 30 TABLET | Refills: 0 | Status: SHIPPED | OUTPATIENT
Start: 2021-02-10 | End: 2021-03-12

## 2021-02-10 RX ORDER — MIRTAZAPINE 15 MG/1
15 TABLET, ORALLY DISINTEGRATING ORAL NIGHTLY
Qty: 30 TABLET | Refills: 11 | Status: ON HOLD | OUTPATIENT
Start: 2021-02-10 | End: 2021-09-06 | Stop reason: SDUPTHER

## 2021-02-10 RX ADMIN — POTASSIUM CHLORIDE 20 MEQ: 1500 TABLET, EXTENDED RELEASE ORAL at 09:02

## 2021-02-10 RX ADMIN — METOPROLOL TARTRATE 12.5 MG: 25 TABLET, FILM COATED ORAL at 09:02

## 2021-02-10 RX ADMIN — APIXABAN 5 MG: 5 TABLET, FILM COATED ORAL at 09:02

## 2021-02-10 RX ADMIN — PANTOPRAZOLE SODIUM 40 MG: 40 TABLET, DELAYED RELEASE ORAL at 09:02

## 2021-02-11 ENCOUNTER — PATIENT OUTREACH (OUTPATIENT)
Dept: ADMINISTRATIVE | Facility: CLINIC | Age: 56
End: 2021-02-11

## 2021-02-15 ENCOUNTER — CARE AT HOME (OUTPATIENT)
Dept: HOME HEALTH SERVICES | Facility: CLINIC | Age: 56
End: 2021-02-15
Payer: MEDICAID

## 2021-02-15 VITALS — HEIGHT: 69 IN | BODY MASS INDEX: 21.48 KG/M2 | WEIGHT: 145 LBS

## 2021-02-15 DIAGNOSIS — C77.9 METASTATIC SQUAMOUS CELL CARCINOMA TO LYMPH NODE: Primary | ICD-10-CM

## 2021-02-15 PROCEDURE — 99214 PR OFFICE/OUTPT VISIT, EST, LEVL IV, 30-39 MIN: ICD-10-PCS | Mod: 95,,, | Performed by: NURSE PRACTITIONER

## 2021-02-15 PROCEDURE — 99214 OFFICE O/P EST MOD 30 MIN: CPT | Mod: 95,,, | Performed by: NURSE PRACTITIONER

## 2021-02-18 DIAGNOSIS — Z43.6 ATTENTION TO UROSTOMY: ICD-10-CM

## 2021-02-18 DIAGNOSIS — Z43.2 ATTENTION TO ILEOSTOMY: Primary | ICD-10-CM

## 2021-03-23 ENCOUNTER — TELEPHONE (OUTPATIENT)
Dept: INTERNAL MEDICINE | Facility: CLINIC | Age: 56
End: 2021-03-23

## 2021-03-30 NOTE — ASSESSMENT & PLAN NOTE
EGD with gastritis and esophagitis  Protonix changed to BID  No n./v today  Advance to LRD   assess - pt received seated in bedside chair after PT

## 2021-04-01 ENCOUNTER — TELEPHONE (OUTPATIENT)
Dept: GYNECOLOGIC ONCOLOGY | Facility: CLINIC | Age: 56
End: 2021-04-01

## 2021-04-03 NOTE — ED NOTES
The patient drove into the ED by private vehicle due to CP and abdominal discomfort. She states that she is severely weak and was almost not able to make it inside. The patient is awake, alert, and oriented at this time. Pupils are KIP. No facial drooping. Speech is whispered and hoarse. Patient states that this is because of the GERD. The lung sounds are clear. She states that she has been having a productive cough for the last couple of weeks. Respirations are even and unlabored. Cardiac rhythm is NSR with no extra heart sounds. Abdomen is soft and round with sounds of digestion in all quadrants. She states that she is having a burning sensation to her epigastric abdomen and spitting up acid.The patient denies diarrhea or constipation. The patient is not complaining of any urinary problems at this time. Provider seeing  the patient. Will continue to monitor closely.    Principal Discharge DX:	Thigh pain  Goal:	left

## 2021-04-09 ENCOUNTER — TELEPHONE (OUTPATIENT)
Dept: GASTROENTEROLOGY | Facility: CLINIC | Age: 56
End: 2021-04-09

## 2021-04-16 ENCOUNTER — HOSPITAL ENCOUNTER (INPATIENT)
Facility: HOSPITAL | Age: 56
LOS: 13 days | Discharge: NURSING, REHAB OR LONG-TERM CARE | DRG: 698 | End: 2021-04-29
Attending: EMERGENCY MEDICINE | Admitting: HOSPITALIST
Payer: MEDICAID

## 2021-04-16 DIAGNOSIS — N13.9 OBSTRUCTIVE UROPATHY: ICD-10-CM

## 2021-04-16 DIAGNOSIS — R00.0 TACHYCARDIA: ICD-10-CM

## 2021-04-16 DIAGNOSIS — B96.5 PSEUDOMONAS URINARY TRACT INFECTION: ICD-10-CM

## 2021-04-16 DIAGNOSIS — R62.7 FAILURE TO THRIVE IN ADULT: ICD-10-CM

## 2021-04-16 DIAGNOSIS — N39.0 PSEUDOMONAS URINARY TRACT INFECTION: ICD-10-CM

## 2021-04-16 DIAGNOSIS — N39.0 SEPSIS SECONDARY TO UTI: ICD-10-CM

## 2021-04-16 DIAGNOSIS — F33.1 MODERATE EPISODE OF RECURRENT MAJOR DEPRESSIVE DISORDER: ICD-10-CM

## 2021-04-16 DIAGNOSIS — E87.6 HYPOKALEMIA: ICD-10-CM

## 2021-04-16 DIAGNOSIS — Z93.6 PRESENCE OF UROSTOMY: ICD-10-CM

## 2021-04-16 DIAGNOSIS — A41.9 SEPTIC SHOCK: Primary | ICD-10-CM

## 2021-04-16 DIAGNOSIS — F32.A DEPRESSION, UNSPECIFIED DEPRESSION TYPE: ICD-10-CM

## 2021-04-16 DIAGNOSIS — E86.1 HYPOVOLEMIA: ICD-10-CM

## 2021-04-16 DIAGNOSIS — Z09 SURGICAL FOLLOWUP: ICD-10-CM

## 2021-04-16 DIAGNOSIS — Z93.3 COLOSTOMY IN PLACE: ICD-10-CM

## 2021-04-16 DIAGNOSIS — N13.5 BILATERAL URETERAL OBSTRUCTION: Chronic | ICD-10-CM

## 2021-04-16 DIAGNOSIS — I10 ESSENTIAL HYPERTENSION: Chronic | ICD-10-CM

## 2021-04-16 DIAGNOSIS — I95.9 HYPOTENSION, UNSPECIFIED HYPOTENSION TYPE: ICD-10-CM

## 2021-04-16 DIAGNOSIS — Z93.2 ILEOSTOMY IN PLACE: ICD-10-CM

## 2021-04-16 DIAGNOSIS — R94.31 QT PROLONGATION: ICD-10-CM

## 2021-04-16 DIAGNOSIS — R78.81 BACTEREMIA: ICD-10-CM

## 2021-04-16 DIAGNOSIS — R07.9 CHEST PAIN: ICD-10-CM

## 2021-04-16 DIAGNOSIS — E87.20 METABOLIC ACIDOSIS, NORMAL ANION GAP (NAG): ICD-10-CM

## 2021-04-16 DIAGNOSIS — E86.0 DEHYDRATION: ICD-10-CM

## 2021-04-16 DIAGNOSIS — Z79.01 CHRONIC ANTICOAGULATION: Chronic | ICD-10-CM

## 2021-04-16 DIAGNOSIS — A41.9 SEPSIS SECONDARY TO UTI: ICD-10-CM

## 2021-04-16 DIAGNOSIS — R65.21 SEPTIC SHOCK: Primary | ICD-10-CM

## 2021-04-16 DIAGNOSIS — Z86.718 HISTORY OF DVT (DEEP VEIN THROMBOSIS): Chronic | ICD-10-CM

## 2021-04-16 DIAGNOSIS — Z93.6 NEPHROSTOMY STATUS: ICD-10-CM

## 2021-04-16 DIAGNOSIS — N17.9 ACUTE KIDNEY INJURY: ICD-10-CM

## 2021-04-16 PROBLEM — E87.29 HIGH ANION GAP METABOLIC ACIDOSIS: Status: ACTIVE | Noted: 2021-02-05

## 2021-04-16 PROBLEM — E86.9 VOLUME DEPLETION: Status: ACTIVE | Noted: 2021-04-16

## 2021-04-16 LAB
ALBUMIN SERPL BCP-MCNC: 2.9 G/DL (ref 3.5–5.2)
ALLENS TEST: ABNORMAL
ALP SERPL-CCNC: 230 U/L (ref 55–135)
ALT SERPL W/O P-5'-P-CCNC: 45 U/L (ref 10–44)
AMORPH CRY UR QL COMP ASSIST: ABNORMAL
ANION GAP SERPL CALC-SCNC: 12 MMOL/L (ref 8–16)
ANION GAP SERPL CALC-SCNC: 13 MMOL/L (ref 8–16)
ANION GAP SERPL CALC-SCNC: 13 MMOL/L (ref 8–16)
ANION GAP SERPL CALC-SCNC: 14 MMOL/L (ref 8–16)
ANION GAP SERPL CALC-SCNC: 15 MMOL/L (ref 8–16)
ANION GAP SERPL CALC-SCNC: 16 MMOL/L (ref 8–16)
AST SERPL-CCNC: 27 U/L (ref 10–40)
B-OH-BUTYR BLD STRIP-SCNC: 0.9 MMOL/L (ref 0–0.5)
BACTERIA #/AREA URNS AUTO: ABNORMAL /HPF
BASOPHILS # BLD AUTO: 0.02 K/UL (ref 0–0.2)
BASOPHILS NFR BLD: 0.1 % (ref 0–1.9)
BILIRUB SERPL-MCNC: 0.6 MG/DL (ref 0.1–1)
BILIRUB UR QL STRIP: NEGATIVE
BUN SERPL-MCNC: 54 MG/DL (ref 6–20)
BUN SERPL-MCNC: 56 MG/DL (ref 6–20)
BUN SERPL-MCNC: 58 MG/DL (ref 6–20)
BUN SERPL-MCNC: 61 MG/DL (ref 6–20)
BUN SERPL-MCNC: 63 MG/DL (ref 6–30)
BUN SERPL-MCNC: 65 MG/DL (ref 6–20)
BUN SERPL-MCNC: 73 MG/DL (ref 6–20)
CALCIUM SERPL-MCNC: 8 MG/DL (ref 8.7–10.5)
CALCIUM SERPL-MCNC: 8.7 MG/DL (ref 8.7–10.5)
CALCIUM SERPL-MCNC: 8.9 MG/DL (ref 8.7–10.5)
CALCIUM SERPL-MCNC: 8.9 MG/DL (ref 8.7–10.5)
CALCIUM SERPL-MCNC: 9 MG/DL (ref 8.7–10.5)
CALCIUM SERPL-MCNC: 9.3 MG/DL (ref 8.7–10.5)
CHLORIDE SERPL-SCNC: 109 MMOL/L (ref 95–110)
CHLORIDE SERPL-SCNC: 111 MMOL/L (ref 95–110)
CHLORIDE SERPL-SCNC: 111 MMOL/L (ref 95–110)
CHLORIDE SERPL-SCNC: 112 MMOL/L (ref 95–110)
CHLORIDE SERPL-SCNC: 112 MMOL/L (ref 95–110)
CHLORIDE SERPL-SCNC: 113 MMOL/L (ref 95–110)
CHLORIDE SERPL-SCNC: 114 MMOL/L (ref 95–110)
CLARITY UR REFRACT.AUTO: ABNORMAL
CO2 SERPL-SCNC: 10 MMOL/L (ref 23–29)
CO2 SERPL-SCNC: 11 MMOL/L (ref 23–29)
CO2 SERPL-SCNC: 11 MMOL/L (ref 23–29)
CO2 SERPL-SCNC: 12 MMOL/L (ref 23–29)
COLOR UR AUTO: ABNORMAL
CREAT SERPL-MCNC: 2.4 MG/DL (ref 0.5–1.4)
CREAT SERPL-MCNC: 2.4 MG/DL (ref 0.5–1.4)
CREAT SERPL-MCNC: 2.5 MG/DL (ref 0.5–1.4)
CREAT SERPL-MCNC: 2.5 MG/DL (ref 0.5–1.4)
CREAT SERPL-MCNC: 2.6 MG/DL (ref 0.5–1.4)
CREAT SERPL-MCNC: 2.8 MG/DL (ref 0.5–1.4)
CREAT SERPL-MCNC: 2.9 MG/DL (ref 0.5–1.4)
CREAT UR-MCNC: 142 MG/DL (ref 15–325)
CTP QC/QA: YES
DELSYS: ABNORMAL
DIFFERENTIAL METHOD: ABNORMAL
EOSINOPHIL # BLD AUTO: 0 K/UL (ref 0–0.5)
EOSINOPHIL NFR BLD: 0.1 % (ref 0–8)
ERYTHROCYTE [DISTWIDTH] IN BLOOD BY AUTOMATED COUNT: 15.2 % (ref 11.5–14.5)
EST. GFR  (AFRICAN AMERICAN): 20.7 ML/MIN/1.73 M^2
EST. GFR  (AFRICAN AMERICAN): 22.6 ML/MIN/1.73 M^2
EST. GFR  (AFRICAN AMERICAN): 23.7 ML/MIN/1.73 M^2
EST. GFR  (AFRICAN AMERICAN): 23.7 ML/MIN/1.73 M^2
EST. GFR  (AFRICAN AMERICAN): 24.9 ML/MIN/1.73 M^2
EST. GFR  (AFRICAN AMERICAN): 24.9 ML/MIN/1.73 M^2
EST. GFR  (NON AFRICAN AMERICAN): 17.9 ML/MIN/1.73 M^2
EST. GFR  (NON AFRICAN AMERICAN): 19.6 ML/MIN/1.73 M^2
EST. GFR  (NON AFRICAN AMERICAN): 20.6 ML/MIN/1.73 M^2
EST. GFR  (NON AFRICAN AMERICAN): 20.6 ML/MIN/1.73 M^2
EST. GFR  (NON AFRICAN AMERICAN): 21.6 ML/MIN/1.73 M^2
EST. GFR  (NON AFRICAN AMERICAN): 21.6 ML/MIN/1.73 M^2
GLUCOSE SERPL-MCNC: 107 MG/DL (ref 70–110)
GLUCOSE SERPL-MCNC: 116 MG/DL (ref 70–110)
GLUCOSE SERPL-MCNC: 121 MG/DL (ref 70–110)
GLUCOSE SERPL-MCNC: 130 MG/DL (ref 70–110)
GLUCOSE SERPL-MCNC: 133 MG/DL (ref 70–110)
GLUCOSE SERPL-MCNC: 133 MG/DL (ref 70–110)
GLUCOSE SERPL-MCNC: 142 MG/DL (ref 70–110)
GLUCOSE UR QL STRIP: NEGATIVE
HCO3 UR-SCNC: 14.2 MMOL/L (ref 24–28)
HCT VFR BLD AUTO: 38.8 % (ref 37–48.5)
HCT VFR BLD CALC: 40 %PCV (ref 36–54)
HCV AB SERPL QL IA: NEGATIVE
HGB BLD-MCNC: 13 G/DL (ref 12–16)
HGB UR QL STRIP: ABNORMAL
HIV 1+2 AB+HIV1 P24 AG SERPL QL IA: NEGATIVE
HYALINE CASTS UR QL AUTO: 14 /LPF
IMM GRANULOCYTES # BLD AUTO: 0.14 K/UL (ref 0–0.04)
IMM GRANULOCYTES NFR BLD AUTO: 0.7 % (ref 0–0.5)
KETONES UR QL STRIP: NEGATIVE
LACTATE SERPL-SCNC: 1.8 MMOL/L (ref 0.5–2.2)
LACTATE SERPL-SCNC: 1.8 MMOL/L (ref 0.5–2.2)
LEUKOCYTE ESTERASE UR QL STRIP: ABNORMAL
LIPASE SERPL-CCNC: 35 U/L (ref 4–60)
LYMPHOCYTES # BLD AUTO: 0.6 K/UL (ref 1–4.8)
LYMPHOCYTES NFR BLD: 3.1 % (ref 18–48)
MAGNESIUM SERPL-MCNC: 2.2 MG/DL (ref 1.6–2.6)
MCH RBC QN AUTO: 28.6 PG (ref 27–31)
MCHC RBC AUTO-ENTMCNC: 33.5 G/DL (ref 32–36)
MCV RBC AUTO: 86 FL (ref 82–98)
MICROSCOPIC COMMENT: ABNORMAL
MONOCYTES # BLD AUTO: 1.5 K/UL (ref 0.3–1)
MONOCYTES NFR BLD: 8 % (ref 4–15)
NEUTROPHILS # BLD AUTO: 16.9 K/UL (ref 1.8–7.7)
NEUTROPHILS NFR BLD: 88 % (ref 38–73)
NITRITE UR QL STRIP: NEGATIVE
NRBC BLD-RTO: 0 /100 WBC
OSMOLALITY UR: 348 MOSM/KG (ref 50–1200)
PCO2 BLDA: 34.9 MMHG (ref 35–45)
PH SMN: 7.22 [PH] (ref 7.35–7.45)
PH UR STRIP: 8 [PH] (ref 5–8)
PLATELET # BLD AUTO: 284 K/UL (ref 150–450)
PMV BLD AUTO: 11.1 FL (ref 9.2–12.9)
PO2 BLDA: 30 MMHG (ref 40–60)
POC BE: -14 MMOL/L
POC IONIZED CALCIUM: 1.12 MMOL/L (ref 1.06–1.42)
POC SATURATED O2: 45 % (ref 95–100)
POC TCO2 (MEASURED): 13 MMOL/L (ref 23–29)
POC TCO2: 15 MMOL/L (ref 24–29)
POTASSIUM BLD-SCNC: 2.1 MMOL/L (ref 3.5–5.1)
POTASSIUM SERPL-SCNC: 2.2 MMOL/L (ref 3.5–5.1)
POTASSIUM SERPL-SCNC: 2.3 MMOL/L (ref 3.5–5.1)
POTASSIUM SERPL-SCNC: 2.5 MMOL/L (ref 3.5–5.1)
POTASSIUM SERPL-SCNC: 2.9 MMOL/L (ref 3.5–5.1)
POTASSIUM SERPL-SCNC: 3.2 MMOL/L (ref 3.5–5.1)
POTASSIUM SERPL-SCNC: 5.9 MMOL/L (ref 3.5–5.1)
POTASSIUM UR-SCNC: 16 MMOL/L (ref 15–95)
PROT SERPL-MCNC: 7.5 G/DL (ref 6–8.4)
PROT UR QL STRIP: ABNORMAL
RBC # BLD AUTO: 4.54 M/UL (ref 4–5.4)
RBC #/AREA URNS AUTO: 12 /HPF (ref 0–4)
SAMPLE: ABNORMAL
SAMPLE: ABNORMAL
SARS-COV-2 RDRP RESP QL NAA+PROBE: NEGATIVE
SITE: ABNORMAL
SODIUM BLD-SCNC: 136 MMOL/L (ref 136–145)
SODIUM SERPL-SCNC: 134 MMOL/L (ref 136–145)
SODIUM SERPL-SCNC: 135 MMOL/L (ref 136–145)
SODIUM SERPL-SCNC: 135 MMOL/L (ref 136–145)
SODIUM SERPL-SCNC: 137 MMOL/L (ref 136–145)
SODIUM SERPL-SCNC: 137 MMOL/L (ref 136–145)
SODIUM SERPL-SCNC: 140 MMOL/L (ref 136–145)
SP GR UR STRIP: 1.01 (ref 1–1.03)
URN SPEC COLLECT METH UR: ABNORMAL
WBC # BLD AUTO: 19.2 K/UL (ref 3.9–12.7)
WBC #/AREA URNS AUTO: 67 /HPF (ref 0–5)

## 2021-04-16 PROCEDURE — 80048 BASIC METABOLIC PNL TOTAL CA: CPT | Mod: 91 | Performed by: STUDENT IN AN ORGANIZED HEALTH CARE EDUCATION/TRAINING PROGRAM

## 2021-04-16 PROCEDURE — 99223 1ST HOSP IP/OBS HIGH 75: CPT | Mod: ,,, | Performed by: HOSPITALIST

## 2021-04-16 PROCEDURE — 83935 ASSAY OF URINE OSMOLALITY: CPT | Performed by: STUDENT IN AN ORGANIZED HEALTH CARE EDUCATION/TRAINING PROGRAM

## 2021-04-16 PROCEDURE — 99223 PR INITIAL HOSPITAL CARE,LEVL III: ICD-10-PCS | Mod: ,,, | Performed by: HOSPITALIST

## 2021-04-16 PROCEDURE — 83605 ASSAY OF LACTIC ACID: CPT | Mod: 91 | Performed by: HOSPITALIST

## 2021-04-16 PROCEDURE — 93010 ELECTROCARDIOGRAM REPORT: CPT | Mod: ,,, | Performed by: INTERNAL MEDICINE

## 2021-04-16 PROCEDURE — 81001 URINALYSIS AUTO W/SCOPE: CPT | Performed by: STUDENT IN AN ORGANIZED HEALTH CARE EDUCATION/TRAINING PROGRAM

## 2021-04-16 PROCEDURE — 63600175 PHARM REV CODE 636 W HCPCS: Performed by: STUDENT IN AN ORGANIZED HEALTH CARE EDUCATION/TRAINING PROGRAM

## 2021-04-16 PROCEDURE — 96361 HYDRATE IV INFUSION ADD-ON: CPT

## 2021-04-16 PROCEDURE — 86703 HIV-1/HIV-2 1 RESULT ANTBDY: CPT | Performed by: EMERGENCY MEDICINE

## 2021-04-16 PROCEDURE — 82800 BLOOD PH: CPT

## 2021-04-16 PROCEDURE — U0002 COVID-19 LAB TEST NON-CDC: HCPCS | Performed by: STUDENT IN AN ORGANIZED HEALTH CARE EDUCATION/TRAINING PROGRAM

## 2021-04-16 PROCEDURE — 93005 ELECTROCARDIOGRAM TRACING: CPT

## 2021-04-16 PROCEDURE — 87040 BLOOD CULTURE FOR BACTERIA: CPT | Mod: 59 | Performed by: STUDENT IN AN ORGANIZED HEALTH CARE EDUCATION/TRAINING PROGRAM

## 2021-04-16 PROCEDURE — 80053 COMPREHEN METABOLIC PANEL: CPT | Performed by: STUDENT IN AN ORGANIZED HEALTH CARE EDUCATION/TRAINING PROGRAM

## 2021-04-16 PROCEDURE — 83735 ASSAY OF MAGNESIUM: CPT | Performed by: STUDENT IN AN ORGANIZED HEALTH CARE EDUCATION/TRAINING PROGRAM

## 2021-04-16 PROCEDURE — 96368 THER/DIAG CONCURRENT INF: CPT

## 2021-04-16 PROCEDURE — 96365 THER/PROPH/DIAG IV INF INIT: CPT

## 2021-04-16 PROCEDURE — 99285 PR EMERGENCY DEPT VISIT,LEVEL V: ICD-10-PCS | Mod: ,,, | Performed by: EMERGENCY MEDICINE

## 2021-04-16 PROCEDURE — 82570 ASSAY OF URINE CREATININE: CPT | Performed by: STUDENT IN AN ORGANIZED HEALTH CARE EDUCATION/TRAINING PROGRAM

## 2021-04-16 PROCEDURE — 80048 BASIC METABOLIC PNL TOTAL CA: CPT | Mod: 91 | Performed by: HOSPITALIST

## 2021-04-16 PROCEDURE — 25000003 PHARM REV CODE 250: Performed by: STUDENT IN AN ORGANIZED HEALTH CARE EDUCATION/TRAINING PROGRAM

## 2021-04-16 PROCEDURE — 85025 COMPLETE CBC W/AUTO DIFF WBC: CPT | Performed by: STUDENT IN AN ORGANIZED HEALTH CARE EDUCATION/TRAINING PROGRAM

## 2021-04-16 PROCEDURE — 36415 COLL VENOUS BLD VENIPUNCTURE: CPT | Performed by: HOSPITALIST

## 2021-04-16 PROCEDURE — 87186 SC STD MICRODIL/AGAR DIL: CPT | Performed by: STUDENT IN AN ORGANIZED HEALTH CARE EDUCATION/TRAINING PROGRAM

## 2021-04-16 PROCEDURE — 93010 EKG 12-LEAD: ICD-10-PCS | Mod: ,,, | Performed by: INTERNAL MEDICINE

## 2021-04-16 PROCEDURE — 87077 CULTURE AEROBIC IDENTIFY: CPT | Performed by: STUDENT IN AN ORGANIZED HEALTH CARE EDUCATION/TRAINING PROGRAM

## 2021-04-16 PROCEDURE — 82010 KETONE BODYS QUAN: CPT | Performed by: HOSPITALIST

## 2021-04-16 PROCEDURE — 80048 BASIC METABOLIC PNL TOTAL CA: CPT | Performed by: STUDENT IN AN ORGANIZED HEALTH CARE EDUCATION/TRAINING PROGRAM

## 2021-04-16 PROCEDURE — 82803 BLOOD GASES ANY COMBINATION: CPT

## 2021-04-16 PROCEDURE — 20600001 HC STEP DOWN PRIVATE ROOM

## 2021-04-16 PROCEDURE — 86803 HEPATITIS C AB TEST: CPT | Performed by: EMERGENCY MEDICINE

## 2021-04-16 PROCEDURE — 87086 URINE CULTURE/COLONY COUNT: CPT | Performed by: STUDENT IN AN ORGANIZED HEALTH CARE EDUCATION/TRAINING PROGRAM

## 2021-04-16 PROCEDURE — 99900035 HC TECH TIME PER 15 MIN (STAT)

## 2021-04-16 PROCEDURE — 84133 ASSAY OF URINE POTASSIUM: CPT | Performed by: STUDENT IN AN ORGANIZED HEALTH CARE EDUCATION/TRAINING PROGRAM

## 2021-04-16 PROCEDURE — 80047 BASIC METABLC PNL IONIZED CA: CPT

## 2021-04-16 PROCEDURE — 99285 EMERGENCY DEPT VISIT HI MDM: CPT | Mod: ,,, | Performed by: EMERGENCY MEDICINE

## 2021-04-16 PROCEDURE — 99285 EMERGENCY DEPT VISIT HI MDM: CPT | Mod: 25

## 2021-04-16 PROCEDURE — 96375 TX/PRO/DX INJ NEW DRUG ADDON: CPT

## 2021-04-16 PROCEDURE — 83605 ASSAY OF LACTIC ACID: CPT | Performed by: STUDENT IN AN ORGANIZED HEALTH CARE EDUCATION/TRAINING PROGRAM

## 2021-04-16 PROCEDURE — 83690 ASSAY OF LIPASE: CPT | Performed by: STUDENT IN AN ORGANIZED HEALTH CARE EDUCATION/TRAINING PROGRAM

## 2021-04-16 RX ORDER — POTASSIUM CHLORIDE 20 MEQ/1
40 TABLET, EXTENDED RELEASE ORAL ONCE
Status: COMPLETED | OUTPATIENT
Start: 2021-04-16 | End: 2021-04-16

## 2021-04-16 RX ORDER — POTASSIUM CHLORIDE 7.45 MG/ML
10 INJECTION INTRAVENOUS
Status: COMPLETED | OUTPATIENT
Start: 2021-04-16 | End: 2021-04-16

## 2021-04-16 RX ORDER — SODIUM CHLORIDE, SODIUM LACTATE, POTASSIUM CHLORIDE, CALCIUM CHLORIDE 600; 310; 30; 20 MG/100ML; MG/100ML; MG/100ML; MG/100ML
INJECTION, SOLUTION INTRAVENOUS CONTINUOUS
Status: DISCONTINUED | OUTPATIENT
Start: 2021-04-17 | End: 2021-04-17

## 2021-04-16 RX ORDER — SODIUM CHLORIDE 0.9 % (FLUSH) 0.9 %
10 SYRINGE (ML) INJECTION
Status: DISCONTINUED | OUTPATIENT
Start: 2021-04-16 | End: 2021-04-29 | Stop reason: HOSPADM

## 2021-04-16 RX ORDER — POTASSIUM CHLORIDE 7.45 MG/ML
10 INJECTION INTRAVENOUS
Status: COMPLETED | OUTPATIENT
Start: 2021-04-16 | End: 2021-04-17

## 2021-04-16 RX ORDER — IBUPROFEN 200 MG
24 TABLET ORAL
Status: DISCONTINUED | OUTPATIENT
Start: 2021-04-16 | End: 2021-04-29 | Stop reason: HOSPADM

## 2021-04-16 RX ORDER — PANTOPRAZOLE SODIUM 40 MG/1
40 TABLET, DELAYED RELEASE ORAL DAILY
Status: DISCONTINUED | OUTPATIENT
Start: 2021-04-16 | End: 2021-04-29 | Stop reason: HOSPADM

## 2021-04-16 RX ORDER — IBUPROFEN 200 MG
16 TABLET ORAL
Status: DISCONTINUED | OUTPATIENT
Start: 2021-04-16 | End: 2021-04-29 | Stop reason: HOSPADM

## 2021-04-16 RX ORDER — MAGNESIUM SULFATE HEPTAHYDRATE 40 MG/ML
2 INJECTION, SOLUTION INTRAVENOUS
Status: COMPLETED | OUTPATIENT
Start: 2021-04-16 | End: 2021-04-16

## 2021-04-16 RX ORDER — POTASSIUM CHLORIDE 7.45 MG/ML
10 INJECTION INTRAVENOUS
Status: CANCELLED | OUTPATIENT
Start: 2021-04-16 | End: 2021-04-16

## 2021-04-16 RX ORDER — ACETAMINOPHEN 325 MG/1
650 TABLET ORAL EVERY 6 HOURS PRN
Status: DISCONTINUED | OUTPATIENT
Start: 2021-04-16 | End: 2021-04-29 | Stop reason: HOSPADM

## 2021-04-16 RX ORDER — GLUCAGON 1 MG
1 KIT INJECTION
Status: DISCONTINUED | OUTPATIENT
Start: 2021-04-16 | End: 2021-04-29 | Stop reason: HOSPADM

## 2021-04-16 RX ORDER — POTASSIUM CHLORIDE 7.45 MG/ML
10 INJECTION INTRAVENOUS ONCE
Status: COMPLETED | OUTPATIENT
Start: 2021-04-16 | End: 2021-04-16

## 2021-04-16 RX ORDER — ONDANSETRON 2 MG/ML
4 INJECTION INTRAMUSCULAR; INTRAVENOUS
Status: COMPLETED | OUTPATIENT
Start: 2021-04-16 | End: 2021-04-16

## 2021-04-16 RX ADMIN — PIPERACILLIN AND TAZOBACTAM 4.5 G: 4; .5 INJECTION, POWDER, LYOPHILIZED, FOR SOLUTION INTRAVENOUS; PARENTERAL at 01:04

## 2021-04-16 RX ADMIN — POTASSIUM CHLORIDE 10 MEQ: 7.46 INJECTION, SOLUTION INTRAVENOUS at 08:04

## 2021-04-16 RX ADMIN — SODIUM CHLORIDE, SODIUM LACTATE, POTASSIUM CHLORIDE, AND CALCIUM CHLORIDE 1000 ML: .6; .31; .03; .02 INJECTION, SOLUTION INTRAVENOUS at 08:04

## 2021-04-16 RX ADMIN — PIPERACILLIN AND TAZOBACTAM 4.5 G: 4; .5 INJECTION, POWDER, LYOPHILIZED, FOR SOLUTION INTRAVENOUS; PARENTERAL at 09:04

## 2021-04-16 RX ADMIN — POTASSIUM BICARBONATE 25 MEQ: 977.5 TABLET, EFFERVESCENT ORAL at 09:04

## 2021-04-16 RX ADMIN — ONDANSETRON 4 MG: 2 INJECTION INTRAMUSCULAR; INTRAVENOUS at 01:04

## 2021-04-16 RX ADMIN — POTASSIUM BICARBONATE 25 MEQ: 978 TABLET, EFFERVESCENT ORAL at 05:04

## 2021-04-16 RX ADMIN — SODIUM CHLORIDE, SODIUM LACTATE, POTASSIUM CHLORIDE, AND CALCIUM CHLORIDE 500 ML: .6; .31; .03; .02 INJECTION, SOLUTION INTRAVENOUS at 02:04

## 2021-04-16 RX ADMIN — APIXABAN 5 MG: 5 TABLET, FILM COATED ORAL at 09:04

## 2021-04-16 RX ADMIN — POTASSIUM CHLORIDE 10 MEQ: 7.46 INJECTION, SOLUTION INTRAVENOUS at 10:04

## 2021-04-16 RX ADMIN — PANTOPRAZOLE SODIUM 40 MG: 40 TABLET, DELAYED RELEASE ORAL at 09:04

## 2021-04-16 RX ADMIN — POTASSIUM CHLORIDE 10 MEQ: 7.46 INJECTION, SOLUTION INTRAVENOUS at 12:04

## 2021-04-16 RX ADMIN — POTASSIUM CHLORIDE 40 MEQ: 1500 TABLET, EXTENDED RELEASE ORAL at 09:04

## 2021-04-16 RX ADMIN — MAGNESIUM SULFATE 2 G: 2 INJECTION INTRAVENOUS at 05:04

## 2021-04-16 RX ADMIN — POTASSIUM BICARBONATE 25 MEQ: 978 TABLET, EFFERVESCENT ORAL at 03:04

## 2021-04-16 RX ADMIN — POTASSIUM CHLORIDE 10 MEQ: 7.46 INJECTION, SOLUTION INTRAVENOUS at 09:04

## 2021-04-16 RX ADMIN — PIPERACILLIN AND TAZOBACTAM 4.5 G: 4; .5 INJECTION, POWDER, LYOPHILIZED, FOR SOLUTION INTRAVENOUS; PARENTERAL at 03:04

## 2021-04-16 RX ADMIN — POTASSIUM CHLORIDE 10 MEQ: 7.46 INJECTION, SOLUTION INTRAVENOUS at 03:04

## 2021-04-17 ENCOUNTER — ANESTHESIA EVENT (OUTPATIENT)
Dept: ENDOSCOPY | Facility: HOSPITAL | Age: 56
DRG: 698 | End: 2021-04-17
Payer: MEDICAID

## 2021-04-17 ENCOUNTER — ANESTHESIA (OUTPATIENT)
Dept: ENDOSCOPY | Facility: HOSPITAL | Age: 56
DRG: 698 | End: 2021-04-17
Payer: MEDICAID

## 2021-04-17 PROBLEM — A41.9 SEPTIC SHOCK: Status: ACTIVE | Noted: 2021-04-17

## 2021-04-17 PROBLEM — R65.21 SEPTIC SHOCK: Status: ACTIVE | Noted: 2021-04-17

## 2021-04-17 PROBLEM — R82.71 BACTERIURIA: Status: RESOLVED | Noted: 2021-02-08 | Resolved: 2021-04-17

## 2021-04-17 PROBLEM — E87.20 METABOLIC ACIDOSIS, NORMAL ANION GAP (NAG): Status: ACTIVE | Noted: 2021-04-17

## 2021-04-17 LAB
ALBUMIN SERPL BCP-MCNC: 2.1 G/DL (ref 3.5–5.2)
ALP SERPL-CCNC: 160 U/L (ref 55–135)
ALT SERPL W/O P-5'-P-CCNC: 23 U/L (ref 10–44)
ANION GAP SERPL CALC-SCNC: 10 MMOL/L (ref 8–16)
ANION GAP SERPL CALC-SCNC: 12 MMOL/L (ref 8–16)
ANION GAP SERPL CALC-SCNC: 13 MMOL/L (ref 8–16)
APPEARANCE FLD: NORMAL
APPEARANCE FLD: NORMAL
AST SERPL-CCNC: 18 U/L (ref 10–40)
BACTERIA UR CULT: NORMAL
BACTERIA UR CULT: NORMAL
BASOPHILS # BLD AUTO: 0.01 K/UL (ref 0–0.2)
BASOPHILS NFR BLD: 0.1 % (ref 0–1.9)
BILIRUB SERPL-MCNC: 0.7 MG/DL (ref 0.1–1)
BODY FLD TYPE: NORMAL
BODY FLD TYPE: NORMAL
BUN SERPL-MCNC: 42 MG/DL (ref 6–20)
BUN SERPL-MCNC: 46 MG/DL (ref 6–20)
BUN SERPL-MCNC: 53 MG/DL (ref 6–20)
CALCIUM SERPL-MCNC: 8.1 MG/DL (ref 8.7–10.5)
CALCIUM SERPL-MCNC: 8.8 MG/DL (ref 8.7–10.5)
CALCIUM SERPL-MCNC: 8.8 MG/DL (ref 8.7–10.5)
CHLORIDE SERPL-SCNC: 110 MMOL/L (ref 95–110)
CHLORIDE SERPL-SCNC: 114 MMOL/L (ref 95–110)
CHLORIDE SERPL-SCNC: 115 MMOL/L (ref 95–110)
CHLORIDE SERPL-SCNC: 116 MMOL/L (ref 95–110)
CHLORIDE SERPL-SCNC: 116 MMOL/L (ref 95–110)
CHLORIDE UR-SCNC: <20 MMOL/L (ref 25–200)
CO2 SERPL-SCNC: 11 MMOL/L (ref 23–29)
CO2 SERPL-SCNC: 11 MMOL/L (ref 23–29)
CO2 SERPL-SCNC: 13 MMOL/L (ref 23–29)
CO2 SERPL-SCNC: 13 MMOL/L (ref 23–29)
CO2 SERPL-SCNC: 18 MMOL/L (ref 23–29)
COLOR FLD: NORMAL
COLOR FLD: NORMAL
CREAT SERPL-MCNC: 2.5 MG/DL (ref 0.5–1.4)
CREAT SERPL-MCNC: 2.6 MG/DL (ref 0.5–1.4)
DIFFERENTIAL METHOD: ABNORMAL
EOSINOPHIL # BLD AUTO: 0 K/UL (ref 0–0.5)
EOSINOPHIL NFR BLD: 0.2 % (ref 0–8)
ERYTHROCYTE [DISTWIDTH] IN BLOOD BY AUTOMATED COUNT: 15.5 % (ref 11.5–14.5)
EST. GFR  (AFRICAN AMERICAN): 22.6 ML/MIN/1.73 M^2
EST. GFR  (AFRICAN AMERICAN): 23.7 ML/MIN/1.73 M^2
EST. GFR  (NON AFRICAN AMERICAN): 19.6 ML/MIN/1.73 M^2
EST. GFR  (NON AFRICAN AMERICAN): 20.6 ML/MIN/1.73 M^2
GLUCOSE SERPL-MCNC: 104 MG/DL (ref 70–110)
GLUCOSE SERPL-MCNC: 104 MG/DL (ref 70–110)
GLUCOSE SERPL-MCNC: 113 MG/DL (ref 70–110)
GLUCOSE SERPL-MCNC: 192 MG/DL (ref 70–110)
GLUCOSE SERPL-MCNC: 97 MG/DL (ref 70–110)
GRAM STN SPEC: NORMAL
HCT VFR BLD AUTO: 31.3 % (ref 37–48.5)
HGB BLD-MCNC: 10.4 G/DL (ref 12–16)
IMM GRANULOCYTES # BLD AUTO: 0.1 K/UL (ref 0–0.04)
IMM GRANULOCYTES NFR BLD AUTO: 0.8 % (ref 0–0.5)
LYMPHOCYTES # BLD AUTO: 0.7 K/UL (ref 1–4.8)
LYMPHOCYTES NFR BLD: 5.4 % (ref 18–48)
LYMPHOCYTES NFR FLD MANUAL: 3 %
LYMPHOCYTES NFR FLD MANUAL: 7 %
MAGNESIUM SERPL-MCNC: 2.1 MG/DL (ref 1.6–2.6)
MCH RBC QN AUTO: 28 PG (ref 27–31)
MCHC RBC AUTO-ENTMCNC: 33.2 G/DL (ref 32–36)
MCV RBC AUTO: 84 FL (ref 82–98)
MESOTHL CELL NFR FLD MANUAL: 4 %
MESOTHL CELL NFR FLD MANUAL: 7 %
MONOCYTES # BLD AUTO: 1.3 K/UL (ref 0.3–1)
MONOCYTES NFR BLD: 9.8 % (ref 4–15)
MONOS+MACROS NFR FLD MANUAL: 47 %
MONOS+MACROS NFR FLD MANUAL: 66 %
NEUTROPHILS # BLD AUTO: 10.7 K/UL (ref 1.8–7.7)
NEUTROPHILS NFR BLD: 83.7 % (ref 38–73)
NEUTROPHILS NFR FLD MANUAL: 23 %
NEUTROPHILS NFR FLD MANUAL: 43 %
NRBC BLD-RTO: 0 /100 WBC
PHOSPHATE SERPL-MCNC: 1.9 MG/DL (ref 2.7–4.5)
PLATELET # BLD AUTO: 231 K/UL (ref 150–450)
PMV BLD AUTO: 10.7 FL (ref 9.2–12.9)
POTASSIUM SERPL-SCNC: 2.9 MMOL/L (ref 3.5–5.1)
POTASSIUM SERPL-SCNC: 3.1 MMOL/L (ref 3.5–5.1)
POTASSIUM SERPL-SCNC: 3.2 MMOL/L (ref 3.5–5.1)
POTASSIUM SERPL-SCNC: 3.6 MMOL/L (ref 3.5–5.1)
POTASSIUM SERPL-SCNC: 3.6 MMOL/L (ref 3.5–5.1)
POTASSIUM UR-SCNC: 19 MMOL/L (ref 15–95)
PROT SERPL-MCNC: 5.9 G/DL (ref 6–8.4)
RBC # BLD AUTO: 3.72 M/UL (ref 4–5.4)
SODIUM SERPL-SCNC: 138 MMOL/L (ref 136–145)
SODIUM SERPL-SCNC: 139 MMOL/L (ref 136–145)
SODIUM SERPL-SCNC: 139 MMOL/L (ref 136–145)
SODIUM SERPL-SCNC: 140 MMOL/L (ref 136–145)
SODIUM SERPL-SCNC: 140 MMOL/L (ref 136–145)
SODIUM UR-SCNC: <20 MMOL/L (ref 20–250)
WBC # BLD AUTO: 12.82 K/UL (ref 3.9–12.7)
WBC # FLD: 85 /CU MM
WBC # FLD: NORMAL /CU MM

## 2021-04-17 PROCEDURE — 84300 ASSAY OF URINE SODIUM: CPT | Performed by: STUDENT IN AN ORGANIZED HEALTH CARE EDUCATION/TRAINING PROGRAM

## 2021-04-17 PROCEDURE — 25000003 PHARM REV CODE 250: Performed by: STUDENT IN AN ORGANIZED HEALTH CARE EDUCATION/TRAINING PROGRAM

## 2021-04-17 PROCEDURE — 87077 CULTURE AEROBIC IDENTIFY: CPT | Performed by: INTERNAL MEDICINE

## 2021-04-17 PROCEDURE — 82436 ASSAY OF URINE CHLORIDE: CPT | Performed by: STUDENT IN AN ORGANIZED HEALTH CARE EDUCATION/TRAINING PROGRAM

## 2021-04-17 PROCEDURE — 36415 COLL VENOUS BLD VENIPUNCTURE: CPT | Performed by: HOSPITALIST

## 2021-04-17 PROCEDURE — 37000008 HC ANESTHESIA 1ST 15 MINUTES

## 2021-04-17 PROCEDURE — 25000003 PHARM REV CODE 250: Performed by: HOSPITALIST

## 2021-04-17 PROCEDURE — 99223 1ST HOSP IP/OBS HIGH 75: CPT | Mod: ,,, | Performed by: INTERNAL MEDICINE

## 2021-04-17 PROCEDURE — 83735 ASSAY OF MAGNESIUM: CPT | Performed by: STUDENT IN AN ORGANIZED HEALTH CARE EDUCATION/TRAINING PROGRAM

## 2021-04-17 PROCEDURE — D9220A PRA ANESTHESIA: Mod: ANES,,, | Performed by: ANESTHESIOLOGY

## 2021-04-17 PROCEDURE — 63600175 PHARM REV CODE 636 W HCPCS: Performed by: HOSPITALIST

## 2021-04-17 PROCEDURE — 84133 ASSAY OF URINE POTASSIUM: CPT | Performed by: STUDENT IN AN ORGANIZED HEALTH CARE EDUCATION/TRAINING PROGRAM

## 2021-04-17 PROCEDURE — 87070 CULTURE OTHR SPECIMN AEROBIC: CPT | Performed by: INTERNAL MEDICINE

## 2021-04-17 PROCEDURE — 87102 FUNGUS ISOLATION CULTURE: CPT | Performed by: INTERNAL MEDICINE

## 2021-04-17 PROCEDURE — 87075 CULTR BACTERIA EXCEPT BLOOD: CPT | Performed by: INTERNAL MEDICINE

## 2021-04-17 PROCEDURE — D9220A PRA ANESTHESIA: Mod: CRNA,,, | Performed by: NURSE ANESTHETIST, CERTIFIED REGISTERED

## 2021-04-17 PROCEDURE — 63600175 PHARM REV CODE 636 W HCPCS: Performed by: STUDENT IN AN ORGANIZED HEALTH CARE EDUCATION/TRAINING PROGRAM

## 2021-04-17 PROCEDURE — 20000000 HC ICU ROOM

## 2021-04-17 PROCEDURE — 80048 BASIC METABOLIC PNL TOTAL CA: CPT | Performed by: HOSPITALIST

## 2021-04-17 PROCEDURE — 84100 ASSAY OF PHOSPHORUS: CPT | Performed by: STUDENT IN AN ORGANIZED HEALTH CARE EDUCATION/TRAINING PROGRAM

## 2021-04-17 PROCEDURE — C1751 CATH, INF, PER/CENT/MIDLINE: HCPCS

## 2021-04-17 PROCEDURE — D9220A PRA ANESTHESIA: ICD-10-PCS | Mod: ANES,,, | Performed by: ANESTHESIOLOGY

## 2021-04-17 PROCEDURE — 76937 US GUIDE VASCULAR ACCESS: CPT

## 2021-04-17 PROCEDURE — 85025 COMPLETE CBC W/AUTO DIFF WBC: CPT | Performed by: STUDENT IN AN ORGANIZED HEALTH CARE EDUCATION/TRAINING PROGRAM

## 2021-04-17 PROCEDURE — 99232 PR SUBSEQUENT HOSPITAL CARE,LEVL II: ICD-10-PCS | Mod: ,,, | Performed by: UROLOGY

## 2021-04-17 PROCEDURE — 80053 COMPREHEN METABOLIC PANEL: CPT | Performed by: STUDENT IN AN ORGANIZED HEALTH CARE EDUCATION/TRAINING PROGRAM

## 2021-04-17 PROCEDURE — 87205 SMEAR GRAM STAIN: CPT | Mod: 59 | Performed by: STUDENT IN AN ORGANIZED HEALTH CARE EDUCATION/TRAINING PROGRAM

## 2021-04-17 PROCEDURE — 36410 VNPNXR 3YR/> PHY/QHP DX/THER: CPT

## 2021-04-17 PROCEDURE — 89051 BODY FLUID CELL COUNT: CPT | Performed by: INTERNAL MEDICINE

## 2021-04-17 PROCEDURE — 80048 BASIC METABOLIC PNL TOTAL CA: CPT | Mod: 91 | Performed by: HOSPITALIST

## 2021-04-17 PROCEDURE — 80048 BASIC METABOLIC PNL TOTAL CA: CPT | Mod: 91 | Performed by: INTERNAL MEDICINE

## 2021-04-17 PROCEDURE — 63600175 PHARM REV CODE 636 W HCPCS: Performed by: NURSE ANESTHETIST, CERTIFIED REGISTERED

## 2021-04-17 PROCEDURE — 25000003 PHARM REV CODE 250: Performed by: NURSE ANESTHETIST, CERTIFIED REGISTERED

## 2021-04-17 PROCEDURE — 99232 SBSQ HOSP IP/OBS MODERATE 35: CPT | Mod: ,,, | Performed by: UROLOGY

## 2021-04-17 PROCEDURE — 37000009 HC ANESTHESIA EA ADD 15 MINS

## 2021-04-17 PROCEDURE — D9220A PRA ANESTHESIA: ICD-10-PCS | Mod: CRNA,,, | Performed by: NURSE ANESTHETIST, CERTIFIED REGISTERED

## 2021-04-17 PROCEDURE — 87086 URINE CULTURE/COLONY COUNT: CPT | Mod: 59 | Performed by: STUDENT IN AN ORGANIZED HEALTH CARE EDUCATION/TRAINING PROGRAM

## 2021-04-17 PROCEDURE — 99223 PR INITIAL HOSPITAL CARE,LEVL III: ICD-10-PCS | Mod: ,,, | Performed by: INTERNAL MEDICINE

## 2021-04-17 PROCEDURE — 87205 SMEAR GRAM STAIN: CPT | Mod: 59 | Performed by: INTERNAL MEDICINE

## 2021-04-17 PROCEDURE — 87186 SC STD MICRODIL/AGAR DIL: CPT | Performed by: INTERNAL MEDICINE

## 2021-04-17 RX ORDER — POTASSIUM CHLORIDE 20 MEQ/1
40 TABLET, EXTENDED RELEASE ORAL
Status: COMPLETED | OUTPATIENT
Start: 2021-04-17 | End: 2021-04-17

## 2021-04-17 RX ORDER — POTASSIUM CHLORIDE 7.45 MG/ML
10 INJECTION INTRAVENOUS
Status: COMPLETED | OUTPATIENT
Start: 2021-04-17 | End: 2021-04-17

## 2021-04-17 RX ORDER — FLUCONAZOLE 2 MG/ML
400 INJECTION, SOLUTION INTRAVENOUS
Status: DISCONTINUED | OUTPATIENT
Start: 2021-04-18 | End: 2021-04-18

## 2021-04-17 RX ORDER — NOREPINEPHRINE BITARTRATE/D5W 4MG/250ML
0-.2 PLASTIC BAG, INJECTION (ML) INTRAVENOUS CONTINUOUS
Status: DISPENSED | OUTPATIENT
Start: 2021-04-17 | End: 2021-04-18

## 2021-04-17 RX ORDER — MIDAZOLAM HYDROCHLORIDE 1 MG/ML
INJECTION, SOLUTION INTRAMUSCULAR; INTRAVENOUS
Status: DISCONTINUED | OUTPATIENT
Start: 2021-04-17 | End: 2021-04-17

## 2021-04-17 RX ORDER — ROCURONIUM BROMIDE 10 MG/ML
INJECTION, SOLUTION INTRAVENOUS
Status: DISCONTINUED | OUTPATIENT
Start: 2021-04-17 | End: 2021-04-17

## 2021-04-17 RX ORDER — SUCCINYLCHOLINE CHLORIDE 20 MG/ML
INJECTION INTRAMUSCULAR; INTRAVENOUS
Status: DISCONTINUED | OUTPATIENT
Start: 2021-04-17 | End: 2021-04-17

## 2021-04-17 RX ORDER — FENTANYL CITRATE 50 UG/ML
INJECTION, SOLUTION INTRAMUSCULAR; INTRAVENOUS
Status: DISCONTINUED | OUTPATIENT
Start: 2021-04-17 | End: 2021-04-17

## 2021-04-17 RX ORDER — PROPOFOL 10 MG/ML
VIAL (ML) INTRAVENOUS
Status: DISCONTINUED | OUTPATIENT
Start: 2021-04-17 | End: 2021-04-17

## 2021-04-17 RX ORDER — LIDOCAINE HYDROCHLORIDE 20 MG/ML
INJECTION INTRAVENOUS
Status: DISCONTINUED | OUTPATIENT
Start: 2021-04-17 | End: 2021-04-17

## 2021-04-17 RX ORDER — NOREPINEPHRINE BITARTRATE 1 MG/ML
INJECTION, SOLUTION INTRAVENOUS
Status: DISCONTINUED | OUTPATIENT
Start: 2021-04-17 | End: 2021-04-17

## 2021-04-17 RX ORDER — ONDANSETRON 2 MG/ML
INJECTION INTRAMUSCULAR; INTRAVENOUS
Status: DISCONTINUED | OUTPATIENT
Start: 2021-04-17 | End: 2021-04-17

## 2021-04-17 RX ORDER — SODIUM CHLORIDE 9 MG/ML
INJECTION, SOLUTION INTRAVENOUS CONTINUOUS
Status: DISCONTINUED | OUTPATIENT
Start: 2021-04-17 | End: 2021-04-20

## 2021-04-17 RX ADMIN — PROPOFOL 50 MG: 10 INJECTION, EMULSION INTRAVENOUS at 01:04

## 2021-04-17 RX ADMIN — POTASSIUM CHLORIDE 10 MEQ: 7.46 INJECTION, SOLUTION INTRAVENOUS at 01:04

## 2021-04-17 RX ADMIN — POTASSIUM CHLORIDE 10 MEQ: 7.46 INJECTION, SOLUTION INTRAVENOUS at 12:04

## 2021-04-17 RX ADMIN — ROCURONIUM BROMIDE 5 MG: 10 INJECTION, SOLUTION INTRAVENOUS at 01:04

## 2021-04-17 RX ADMIN — POTASSIUM CHLORIDE 40 MEQ: 1500 TABLET, EXTENDED RELEASE ORAL at 04:04

## 2021-04-17 RX ADMIN — LIDOCAINE HYDROCHLORIDE 100 MG: 20 INJECTION, SOLUTION INTRAVENOUS at 01:04

## 2021-04-17 RX ADMIN — SODIUM CHLORIDE, SODIUM LACTATE, POTASSIUM CHLORIDE, AND CALCIUM CHLORIDE: .6; .31; .03; .02 INJECTION, SOLUTION INTRAVENOUS at 12:04

## 2021-04-17 RX ADMIN — PIPERACILLIN AND TAZOBACTAM 4.5 G: 4; .5 INJECTION, POWDER, LYOPHILIZED, FOR SOLUTION INTRAVENOUS; PARENTERAL at 12:04

## 2021-04-17 RX ADMIN — FLUCONAZOLE, SODIUM CHLORIDE 800 MG: 2 INJECTION INTRAVENOUS at 02:04

## 2021-04-17 RX ADMIN — SODIUM BICARBONATE: 84 INJECTION, SOLUTION INTRAVENOUS at 04:04

## 2021-04-17 RX ADMIN — POTASSIUM CHLORIDE 40 MEQ: 1500 TABLET, EXTENDED RELEASE ORAL at 12:04

## 2021-04-17 RX ADMIN — POTASSIUM CHLORIDE 40 MEQ: 1500 TABLET, EXTENDED RELEASE ORAL at 05:04

## 2021-04-17 RX ADMIN — VANCOMYCIN HYDROCHLORIDE 1250 MG: 1.25 INJECTION, POWDER, LYOPHILIZED, FOR SOLUTION INTRAVENOUS at 08:04

## 2021-04-17 RX ADMIN — SODIUM CHLORIDE: 0.9 INJECTION, SOLUTION INTRAVENOUS at 06:04

## 2021-04-17 RX ADMIN — POTASSIUM BICARBONATE 25 MEQ: 977.5 TABLET, EFFERVESCENT ORAL at 12:04

## 2021-04-17 RX ADMIN — SODIUM BICARBONATE: 84 INJECTION, SOLUTION INTRAVENOUS at 10:04

## 2021-04-17 RX ADMIN — NOREPINEPHRINE BITARTRATE 16 MCG: 1 INJECTION, SOLUTION, CONCENTRATE INTRAVENOUS at 01:04

## 2021-04-17 RX ADMIN — Medication 0.02 MCG/KG/MIN: at 06:04

## 2021-04-17 RX ADMIN — PIPERACILLIN AND TAZOBACTAM 4.5 G: 4; .5 INJECTION, POWDER, LYOPHILIZED, FOR SOLUTION INTRAVENOUS; PARENTERAL at 08:04

## 2021-04-17 RX ADMIN — MIDAZOLAM HYDROCHLORIDE 1 MG: 1 INJECTION, SOLUTION INTRAMUSCULAR; INTRAVENOUS at 01:04

## 2021-04-17 RX ADMIN — POTASSIUM CHLORIDE 10 MEQ: 7.46 INJECTION, SOLUTION INTRAVENOUS at 02:04

## 2021-04-17 RX ADMIN — FENTANYL CITRATE 25 MCG: 50 INJECTION, SOLUTION INTRAMUSCULAR; INTRAVENOUS at 01:04

## 2021-04-17 RX ADMIN — SUCCINYLCHOLINE CHLORIDE 100 MG: 20 INJECTION, SOLUTION INTRAMUSCULAR; INTRAVENOUS at 01:04

## 2021-04-17 RX ADMIN — POTASSIUM CHLORIDE 40 MEQ: 1500 TABLET, EXTENDED RELEASE ORAL at 09:04

## 2021-04-17 RX ADMIN — POTASSIUM CHLORIDE 10 MEQ: 7.46 INJECTION, SOLUTION INTRAVENOUS at 05:04

## 2021-04-17 RX ADMIN — POTASSIUM CHLORIDE 10 MEQ: 7.46 INJECTION, SOLUTION INTRAVENOUS at 04:04

## 2021-04-17 RX ADMIN — PIPERACILLIN AND TAZOBACTAM 4.5 G: 4; .5 INJECTION, POWDER, LYOPHILIZED, FOR SOLUTION INTRAVENOUS; PARENTERAL at 06:04

## 2021-04-17 RX ADMIN — ONDANSETRON 4 MG: 2 INJECTION, SOLUTION INTRAMUSCULAR; INTRAVENOUS at 01:04

## 2021-04-17 RX ADMIN — SODIUM BICARBONATE: 84 INJECTION, SOLUTION INTRAVENOUS at 11:04

## 2021-04-17 RX ADMIN — PROPOFOL 100 MG: 10 INJECTION, EMULSION INTRAVENOUS at 01:04

## 2021-04-18 LAB
ALBUMIN SERPL BCP-MCNC: 1.9 G/DL (ref 3.5–5.2)
ALP SERPL-CCNC: 145 U/L (ref 55–135)
ALT SERPL W/O P-5'-P-CCNC: 15 U/L (ref 10–44)
ANION GAP SERPL CALC-SCNC: 10 MMOL/L (ref 8–16)
ANION GAP SERPL CALC-SCNC: 9 MMOL/L (ref 8–16)
AST SERPL-CCNC: 12 U/L (ref 10–40)
BACTERIA UR CULT: NO GROWTH
BACTERIA UR CULT: NO GROWTH
BASOPHILS # BLD AUTO: 0.01 K/UL (ref 0–0.2)
BASOPHILS NFR BLD: 0.1 % (ref 0–1.9)
BILIRUB SERPL-MCNC: 0.7 MG/DL (ref 0.1–1)
BUN SERPL-MCNC: 24 MG/DL (ref 6–20)
BUN SERPL-MCNC: 31 MG/DL (ref 6–20)
CALCIUM SERPL-MCNC: 7.8 MG/DL (ref 8.7–10.5)
CALCIUM SERPL-MCNC: 8 MG/DL (ref 8.7–10.5)
CHLORIDE SERPL-SCNC: 108 MMOL/L (ref 95–110)
CHLORIDE SERPL-SCNC: 112 MMOL/L (ref 95–110)
CO2 SERPL-SCNC: 23 MMOL/L (ref 23–29)
CO2 SERPL-SCNC: 25 MMOL/L (ref 23–29)
CREAT SERPL-MCNC: 1.9 MG/DL (ref 0.5–1.4)
CREAT SERPL-MCNC: 2.2 MG/DL (ref 0.5–1.4)
DIFFERENTIAL METHOD: ABNORMAL
EOSINOPHIL # BLD AUTO: 0.1 K/UL (ref 0–0.5)
EOSINOPHIL NFR BLD: 0.6 % (ref 0–8)
ERYTHROCYTE [DISTWIDTH] IN BLOOD BY AUTOMATED COUNT: 19.1 % (ref 11.5–14.5)
EST. GFR  (AFRICAN AMERICAN): 27.7 ML/MIN/1.73 M^2
EST. GFR  (AFRICAN AMERICAN): 33 ML/MIN/1.73 M^2
EST. GFR  (NON AFRICAN AMERICAN): 24 ML/MIN/1.73 M^2
EST. GFR  (NON AFRICAN AMERICAN): 28.7 ML/MIN/1.73 M^2
GLUCOSE SERPL-MCNC: 145 MG/DL (ref 70–110)
GLUCOSE SERPL-MCNC: 91 MG/DL (ref 70–110)
HCT VFR BLD AUTO: 30.4 % (ref 37–48.5)
HGB BLD-MCNC: 9.8 G/DL (ref 12–16)
IMM GRANULOCYTES # BLD AUTO: 0.04 K/UL (ref 0–0.04)
IMM GRANULOCYTES NFR BLD AUTO: 0.4 % (ref 0–0.5)
LYMPHOCYTES # BLD AUTO: 0.9 K/UL (ref 1–4.8)
LYMPHOCYTES NFR BLD: 9.1 % (ref 18–48)
MAGNESIUM SERPL-MCNC: 1.7 MG/DL (ref 1.6–2.6)
MCH RBC QN AUTO: 27.6 PG (ref 27–31)
MCHC RBC AUTO-ENTMCNC: 32.2 G/DL (ref 32–36)
MCV RBC AUTO: 86 FL (ref 82–98)
MONOCYTES # BLD AUTO: 1.1 K/UL (ref 0.3–1)
MONOCYTES NFR BLD: 11.6 % (ref 4–15)
NEUTROPHILS # BLD AUTO: 7.4 K/UL (ref 1.8–7.7)
NEUTROPHILS NFR BLD: 78.2 % (ref 38–73)
NRBC BLD-RTO: 0 /100 WBC
PHOSPHATE SERPL-MCNC: 1.2 MG/DL (ref 2.7–4.5)
PLATELET # BLD AUTO: 246 K/UL (ref 150–450)
PMV BLD AUTO: 12.6 FL (ref 9.2–12.9)
POTASSIUM SERPL-SCNC: 3 MMOL/L (ref 3.5–5.1)
POTASSIUM SERPL-SCNC: 3.8 MMOL/L (ref 3.5–5.1)
PROT SERPL-MCNC: 5.4 G/DL (ref 6–8.4)
RBC # BLD AUTO: 3.55 M/UL (ref 4–5.4)
SODIUM SERPL-SCNC: 143 MMOL/L (ref 136–145)
SODIUM SERPL-SCNC: 144 MMOL/L (ref 136–145)
VANCOMYCIN SERPL-MCNC: 16.7 UG/ML
WBC # BLD AUTO: 9.43 K/UL (ref 3.9–12.7)

## 2021-04-18 PROCEDURE — 99233 PR SUBSEQUENT HOSPITAL CARE,LEVL III: ICD-10-PCS | Mod: ,,, | Performed by: INTERNAL MEDICINE

## 2021-04-18 PROCEDURE — 80053 COMPREHEN METABOLIC PANEL: CPT | Performed by: INTERNAL MEDICINE

## 2021-04-18 PROCEDURE — 99233 SBSQ HOSP IP/OBS HIGH 50: CPT | Mod: ,,, | Performed by: INTERNAL MEDICINE

## 2021-04-18 PROCEDURE — 83735 ASSAY OF MAGNESIUM: CPT | Performed by: INTERNAL MEDICINE

## 2021-04-18 PROCEDURE — 63600175 PHARM REV CODE 636 W HCPCS: Performed by: STUDENT IN AN ORGANIZED HEALTH CARE EDUCATION/TRAINING PROGRAM

## 2021-04-18 PROCEDURE — 25000003 PHARM REV CODE 250: Performed by: STUDENT IN AN ORGANIZED HEALTH CARE EDUCATION/TRAINING PROGRAM

## 2021-04-18 PROCEDURE — 20000000 HC ICU ROOM

## 2021-04-18 PROCEDURE — 63600175 PHARM REV CODE 636 W HCPCS: Performed by: INTERNAL MEDICINE

## 2021-04-18 PROCEDURE — 80048 BASIC METABOLIC PNL TOTAL CA: CPT | Performed by: STUDENT IN AN ORGANIZED HEALTH CARE EDUCATION/TRAINING PROGRAM

## 2021-04-18 PROCEDURE — 25000003 PHARM REV CODE 250: Performed by: INTERNAL MEDICINE

## 2021-04-18 PROCEDURE — 80202 ASSAY OF VANCOMYCIN: CPT | Performed by: INTERNAL MEDICINE

## 2021-04-18 PROCEDURE — 85025 COMPLETE CBC W/AUTO DIFF WBC: CPT | Performed by: STUDENT IN AN ORGANIZED HEALTH CARE EDUCATION/TRAINING PROGRAM

## 2021-04-18 PROCEDURE — 84100 ASSAY OF PHOSPHORUS: CPT | Performed by: INTERNAL MEDICINE

## 2021-04-18 RX ORDER — POTASSIUM CHLORIDE 7.45 MG/ML
10 INJECTION INTRAVENOUS ONCE
Status: COMPLETED | OUTPATIENT
Start: 2021-04-18 | End: 2021-04-18

## 2021-04-18 RX ORDER — MAGNESIUM SULFATE HEPTAHYDRATE 40 MG/ML
2 INJECTION, SOLUTION INTRAVENOUS ONCE
Status: COMPLETED | OUTPATIENT
Start: 2021-04-18 | End: 2021-04-18

## 2021-04-18 RX ORDER — SODIUM CHLORIDE 9 MG/ML
INJECTION, SOLUTION INTRAVENOUS CONTINUOUS PRN
Status: DISCONTINUED | OUTPATIENT
Start: 2021-04-18 | End: 2021-04-29 | Stop reason: HOSPADM

## 2021-04-18 RX ORDER — POTASSIUM CHLORIDE 20 MEQ/1
40 TABLET, EXTENDED RELEASE ORAL
Status: COMPLETED | OUTPATIENT
Start: 2021-04-18 | End: 2021-04-18

## 2021-04-18 RX ADMIN — POTASSIUM CHLORIDE 10 MEQ: 7.46 INJECTION, SOLUTION INTRAVENOUS at 10:04

## 2021-04-18 RX ADMIN — POTASSIUM CHLORIDE 40 MEQ: 1500 TABLET, EXTENDED RELEASE ORAL at 12:04

## 2021-04-18 RX ADMIN — PIPERACILLIN AND TAZOBACTAM 4.5 G: 4; .5 INJECTION, POWDER, LYOPHILIZED, FOR SOLUTION INTRAVENOUS; PARENTERAL at 02:04

## 2021-04-18 RX ADMIN — POTASSIUM PHOSPHATE, MONOBASIC AND POTASSIUM PHOSPHATE, DIBASIC 30 MMOL: 224; 236 INJECTION, SOLUTION, CONCENTRATE INTRAVENOUS at 08:04

## 2021-04-18 RX ADMIN — PIPERACILLIN AND TAZOBACTAM 4.5 G: 4; .5 INJECTION, POWDER, LYOPHILIZED, FOR SOLUTION INTRAVENOUS; PARENTERAL at 04:04

## 2021-04-18 RX ADMIN — MAGNESIUM SULFATE 2 G: 2 INJECTION INTRAVENOUS at 08:04

## 2021-04-18 RX ADMIN — FLUCONAZOLE, SODIUM CHLORIDE 400 MG: 2 INJECTION INTRAVENOUS at 10:04

## 2021-04-18 RX ADMIN — SODIUM BICARBONATE: 84 INJECTION, SOLUTION INTRAVENOUS at 06:04

## 2021-04-18 RX ADMIN — PANTOPRAZOLE SODIUM 40 MG: 40 TABLET, DELAYED RELEASE ORAL at 08:04

## 2021-04-18 RX ADMIN — SODIUM CHLORIDE: 0.9 INJECTION, SOLUTION INTRAVENOUS at 08:04

## 2021-04-18 RX ADMIN — POTASSIUM CHLORIDE 40 MEQ: 1500 TABLET, EXTENDED RELEASE ORAL at 11:04

## 2021-04-18 RX ADMIN — VANCOMYCIN HYDROCHLORIDE 750 MG: 750 INJECTION, POWDER, LYOPHILIZED, FOR SOLUTION INTRAVENOUS at 11:04

## 2021-04-18 RX ADMIN — PIPERACILLIN AND TAZOBACTAM 4.5 G: 4; .5 INJECTION, POWDER, LYOPHILIZED, FOR SOLUTION INTRAVENOUS; PARENTERAL at 08:04

## 2021-04-18 RX ADMIN — POTASSIUM CHLORIDE 40 MEQ: 1500 TABLET, EXTENDED RELEASE ORAL at 08:04

## 2021-04-19 ENCOUNTER — ANESTHESIA EVENT (OUTPATIENT)
Dept: SURGERY | Facility: HOSPITAL | Age: 56
DRG: 698 | End: 2021-04-19
Payer: MEDICAID

## 2021-04-19 LAB
ALBUMIN SERPL BCP-MCNC: 2 G/DL (ref 3.5–5.2)
ALP SERPL-CCNC: 147 U/L (ref 55–135)
ALT SERPL W/O P-5'-P-CCNC: 15 U/L (ref 10–44)
ANION GAP SERPL CALC-SCNC: 11 MMOL/L (ref 8–16)
ANION GAP SERPL CALC-SCNC: 9 MMOL/L (ref 8–16)
AST SERPL-CCNC: 15 U/L (ref 10–40)
BASOPHILS # BLD AUTO: 0.01 K/UL (ref 0–0.2)
BASOPHILS NFR BLD: 0.1 % (ref 0–1.9)
BILIRUB SERPL-MCNC: 0.5 MG/DL (ref 0.1–1)
BUN SERPL-MCNC: 16 MG/DL (ref 6–20)
BUN SERPL-MCNC: 21 MG/DL (ref 6–20)
CALCIUM SERPL-MCNC: 7.3 MG/DL (ref 8.7–10.5)
CALCIUM SERPL-MCNC: 8 MG/DL (ref 8.7–10.5)
CHLORIDE SERPL-SCNC: 110 MMOL/L (ref 95–110)
CHLORIDE SERPL-SCNC: 113 MMOL/L (ref 95–110)
CO2 SERPL-SCNC: 22 MMOL/L (ref 23–29)
CO2 SERPL-SCNC: 25 MMOL/L (ref 23–29)
CREAT SERPL-MCNC: 1.4 MG/DL (ref 0.5–1.4)
CREAT SERPL-MCNC: 1.8 MG/DL (ref 0.5–1.4)
DIFFERENTIAL METHOD: ABNORMAL
EOSINOPHIL # BLD AUTO: 0.1 K/UL (ref 0–0.5)
EOSINOPHIL NFR BLD: 1.3 % (ref 0–8)
ERYTHROCYTE [DISTWIDTH] IN BLOOD BY AUTOMATED COUNT: 15.9 % (ref 11.5–14.5)
EST. GFR  (AFRICAN AMERICAN): 35.3 ML/MIN/1.73 M^2
EST. GFR  (AFRICAN AMERICAN): 47.8 ML/MIN/1.73 M^2
EST. GFR  (NON AFRICAN AMERICAN): 30.6 ML/MIN/1.73 M^2
EST. GFR  (NON AFRICAN AMERICAN): 41.4 ML/MIN/1.73 M^2
GLUCOSE SERPL-MCNC: 81 MG/DL (ref 70–110)
GLUCOSE SERPL-MCNC: 93 MG/DL (ref 70–110)
HCT VFR BLD AUTO: 32.4 % (ref 37–48.5)
HGB BLD-MCNC: 10 G/DL (ref 12–16)
IMM GRANULOCYTES # BLD AUTO: 0.05 K/UL (ref 0–0.04)
IMM GRANULOCYTES NFR BLD AUTO: 0.7 % (ref 0–0.5)
LYMPHOCYTES # BLD AUTO: 1.1 K/UL (ref 1–4.8)
LYMPHOCYTES NFR BLD: 15.4 % (ref 18–48)
MAGNESIUM SERPL-MCNC: 1.7 MG/DL (ref 1.6–2.6)
MCH RBC QN AUTO: 27.5 PG (ref 27–31)
MCHC RBC AUTO-ENTMCNC: 30.9 G/DL (ref 32–36)
MCV RBC AUTO: 89 FL (ref 82–98)
MONOCYTES # BLD AUTO: 0.8 K/UL (ref 0.3–1)
MONOCYTES NFR BLD: 11.6 % (ref 4–15)
NEUTROPHILS # BLD AUTO: 4.9 K/UL (ref 1.8–7.7)
NEUTROPHILS NFR BLD: 70.9 % (ref 38–73)
NRBC BLD-RTO: 0 /100 WBC
PHOSPHATE SERPL-MCNC: 3.4 MG/DL (ref 2.7–4.5)
PLATELET # BLD AUTO: 189 K/UL (ref 150–450)
PMV BLD AUTO: 10.6 FL (ref 9.2–12.9)
POTASSIUM SERPL-SCNC: 3.8 MMOL/L (ref 3.5–5.1)
POTASSIUM SERPL-SCNC: 4.4 MMOL/L (ref 3.5–5.1)
PROT SERPL-MCNC: 5.8 G/DL (ref 6–8.4)
RBC # BLD AUTO: 3.63 M/UL (ref 4–5.4)
SODIUM SERPL-SCNC: 143 MMOL/L (ref 136–145)
SODIUM SERPL-SCNC: 147 MMOL/L (ref 136–145)
VANCOMYCIN SERPL-MCNC: 19.1 UG/ML
WBC # BLD AUTO: 6.89 K/UL (ref 3.9–12.7)

## 2021-04-19 PROCEDURE — 80202 ASSAY OF VANCOMYCIN: CPT | Performed by: INTERNAL MEDICINE

## 2021-04-19 PROCEDURE — 80048 BASIC METABOLIC PNL TOTAL CA: CPT | Performed by: STUDENT IN AN ORGANIZED HEALTH CARE EDUCATION/TRAINING PROGRAM

## 2021-04-19 PROCEDURE — 25000003 PHARM REV CODE 250: Performed by: INTERNAL MEDICINE

## 2021-04-19 PROCEDURE — 97165 OT EVAL LOW COMPLEX 30 MIN: CPT

## 2021-04-19 PROCEDURE — 25000003 PHARM REV CODE 250: Performed by: STUDENT IN AN ORGANIZED HEALTH CARE EDUCATION/TRAINING PROGRAM

## 2021-04-19 PROCEDURE — 36415 COLL VENOUS BLD VENIPUNCTURE: CPT | Performed by: STUDENT IN AN ORGANIZED HEALTH CARE EDUCATION/TRAINING PROGRAM

## 2021-04-19 PROCEDURE — 20600001 HC STEP DOWN PRIVATE ROOM

## 2021-04-19 PROCEDURE — 99233 PR SUBSEQUENT HOSPITAL CARE,LEVL III: ICD-10-PCS | Mod: ,,, | Performed by: INTERNAL MEDICINE

## 2021-04-19 PROCEDURE — 84100 ASSAY OF PHOSPHORUS: CPT | Performed by: STUDENT IN AN ORGANIZED HEALTH CARE EDUCATION/TRAINING PROGRAM

## 2021-04-19 PROCEDURE — 63600175 PHARM REV CODE 636 W HCPCS: Performed by: STUDENT IN AN ORGANIZED HEALTH CARE EDUCATION/TRAINING PROGRAM

## 2021-04-19 PROCEDURE — 97161 PT EVAL LOW COMPLEX 20 MIN: CPT

## 2021-04-19 PROCEDURE — 94761 N-INVAS EAR/PLS OXIMETRY MLT: CPT

## 2021-04-19 PROCEDURE — 97535 SELF CARE MNGMENT TRAINING: CPT

## 2021-04-19 PROCEDURE — 99233 SBSQ HOSP IP/OBS HIGH 50: CPT | Mod: ,,, | Performed by: INTERNAL MEDICINE

## 2021-04-19 PROCEDURE — 83735 ASSAY OF MAGNESIUM: CPT | Performed by: STUDENT IN AN ORGANIZED HEALTH CARE EDUCATION/TRAINING PROGRAM

## 2021-04-19 PROCEDURE — 80053 COMPREHEN METABOLIC PANEL: CPT | Performed by: STUDENT IN AN ORGANIZED HEALTH CARE EDUCATION/TRAINING PROGRAM

## 2021-04-19 PROCEDURE — 85025 COMPLETE CBC W/AUTO DIFF WBC: CPT | Performed by: STUDENT IN AN ORGANIZED HEALTH CARE EDUCATION/TRAINING PROGRAM

## 2021-04-19 PROCEDURE — 97530 THERAPEUTIC ACTIVITIES: CPT

## 2021-04-19 PROCEDURE — 63600175 PHARM REV CODE 636 W HCPCS: Performed by: INTERNAL MEDICINE

## 2021-04-19 RX ADMIN — SODIUM CHLORIDE: 0.9 INJECTION, SOLUTION INTRAVENOUS at 08:04

## 2021-04-19 RX ADMIN — PANTOPRAZOLE SODIUM 40 MG: 40 TABLET, DELAYED RELEASE ORAL at 08:04

## 2021-04-19 RX ADMIN — VANCOMYCIN HYDROCHLORIDE 500 MG: 500 INJECTION, POWDER, LYOPHILIZED, FOR SOLUTION INTRAVENOUS at 11:04

## 2021-04-19 RX ADMIN — PIPERACILLIN AND TAZOBACTAM 4.5 G: 4; .5 INJECTION, POWDER, LYOPHILIZED, FOR SOLUTION INTRAVENOUS; PARENTERAL at 02:04

## 2021-04-19 RX ADMIN — PIPERACILLIN AND TAZOBACTAM 4.5 G: 4; .5 INJECTION, POWDER, LYOPHILIZED, FOR SOLUTION INTRAVENOUS; PARENTERAL at 08:04

## 2021-04-19 RX ADMIN — PIPERACILLIN AND TAZOBACTAM 4.5 G: 4; .5 INJECTION, POWDER, LYOPHILIZED, FOR SOLUTION INTRAVENOUS; PARENTERAL at 05:04

## 2021-04-20 ENCOUNTER — ANESTHESIA (OUTPATIENT)
Dept: SURGERY | Facility: HOSPITAL | Age: 56
DRG: 698 | End: 2021-04-20
Payer: MEDICAID

## 2021-04-20 PROBLEM — R78.81 BACTEREMIA: Status: ACTIVE | Noted: 2021-04-20

## 2021-04-20 PROBLEM — E87.6 HYPOKALEMIA: Status: RESOLVED | Noted: 2021-02-03 | Resolved: 2021-04-20

## 2021-04-20 PROBLEM — T88.4XXA HARD TO INTUBATE: Status: ACTIVE | Noted: 2021-04-20

## 2021-04-20 LAB
ALBUMIN SERPL BCP-MCNC: 2.1 G/DL (ref 3.5–5.2)
ALP SERPL-CCNC: 145 U/L (ref 55–135)
ALT SERPL W/O P-5'-P-CCNC: 12 U/L (ref 10–44)
ANION GAP SERPL CALC-SCNC: 10 MMOL/L (ref 8–16)
ANION GAP SERPL CALC-SCNC: 11 MMOL/L (ref 8–16)
AST SERPL-CCNC: 16 U/L (ref 10–40)
BACTERIA BLD CULT: ABNORMAL
BACTERIA SPEC AEROBE CULT: ABNORMAL
BACTERIA SPEC AEROBE CULT: NO GROWTH
BASOPHILS # BLD AUTO: 0.02 K/UL (ref 0–0.2)
BASOPHILS NFR BLD: 0.4 % (ref 0–1.9)
BILIRUB SERPL-MCNC: 0.4 MG/DL (ref 0.1–1)
BUN SERPL-MCNC: 14 MG/DL (ref 6–20)
BUN SERPL-MCNC: 14 MG/DL (ref 6–20)
CALCIUM SERPL-MCNC: 8.5 MG/DL (ref 8.7–10.5)
CALCIUM SERPL-MCNC: 8.7 MG/DL (ref 8.7–10.5)
CHLORIDE SERPL-SCNC: 108 MMOL/L (ref 95–110)
CHLORIDE SERPL-SCNC: 109 MMOL/L (ref 95–110)
CO2 SERPL-SCNC: 24 MMOL/L (ref 23–29)
CO2 SERPL-SCNC: 26 MMOL/L (ref 23–29)
CREAT SERPL-MCNC: 1.5 MG/DL (ref 0.5–1.4)
CREAT SERPL-MCNC: 1.5 MG/DL (ref 0.5–1.4)
DIFFERENTIAL METHOD: ABNORMAL
EOSINOPHIL # BLD AUTO: 0.1 K/UL (ref 0–0.5)
EOSINOPHIL NFR BLD: 2.5 % (ref 0–8)
ERYTHROCYTE [DISTWIDTH] IN BLOOD BY AUTOMATED COUNT: 15.9 % (ref 11.5–14.5)
EST. GFR  (AFRICAN AMERICAN): 44 ML/MIN/1.73 M^2
EST. GFR  (AFRICAN AMERICAN): 44 ML/MIN/1.73 M^2
EST. GFR  (NON AFRICAN AMERICAN): 38.1 ML/MIN/1.73 M^2
EST. GFR  (NON AFRICAN AMERICAN): 38.1 ML/MIN/1.73 M^2
GLUCOSE SERPL-MCNC: 161 MG/DL (ref 70–110)
GLUCOSE SERPL-MCNC: 85 MG/DL (ref 70–110)
HCT VFR BLD AUTO: 36.2 % (ref 37–48.5)
HGB BLD-MCNC: 11.1 G/DL (ref 12–16)
IMM GRANULOCYTES # BLD AUTO: 0.02 K/UL (ref 0–0.04)
IMM GRANULOCYTES NFR BLD AUTO: 0.4 % (ref 0–0.5)
LYMPHOCYTES # BLD AUTO: 1.1 K/UL (ref 1–4.8)
LYMPHOCYTES NFR BLD: 20.6 % (ref 18–48)
MAGNESIUM SERPL-MCNC: 1.4 MG/DL (ref 1.6–2.6)
MCH RBC QN AUTO: 27.8 PG (ref 27–31)
MCHC RBC AUTO-ENTMCNC: 30.7 G/DL (ref 32–36)
MCV RBC AUTO: 91 FL (ref 82–98)
MONOCYTES # BLD AUTO: 0.5 K/UL (ref 0.3–1)
MONOCYTES NFR BLD: 9.8 % (ref 4–15)
NEUTROPHILS # BLD AUTO: 3.5 K/UL (ref 1.8–7.7)
NEUTROPHILS NFR BLD: 66.3 % (ref 38–73)
NRBC BLD-RTO: 0 /100 WBC
PHOSPHATE SERPL-MCNC: 3 MG/DL (ref 2.7–4.5)
PLATELET # BLD AUTO: 208 K/UL (ref 150–450)
PMV BLD AUTO: 10.9 FL (ref 9.2–12.9)
POTASSIUM SERPL-SCNC: 3.9 MMOL/L (ref 3.5–5.1)
POTASSIUM SERPL-SCNC: 4 MMOL/L (ref 3.5–5.1)
PROT SERPL-MCNC: 6.1 G/DL (ref 6–8.4)
RBC # BLD AUTO: 3.99 M/UL (ref 4–5.4)
SARS-COV-2 RDRP RESP QL NAA+PROBE: NEGATIVE
SARS-COV-2 RDRP RESP QL NAA+PROBE: NEGATIVE
SODIUM SERPL-SCNC: 143 MMOL/L (ref 136–145)
SODIUM SERPL-SCNC: 145 MMOL/L (ref 136–145)
VANCOMYCIN SERPL-MCNC: 17.2 UG/ML
WBC # BLD AUTO: 5.29 K/UL (ref 3.9–12.7)

## 2021-04-20 PROCEDURE — 25000003 PHARM REV CODE 250: Performed by: HOSPITALIST

## 2021-04-20 PROCEDURE — 25000003 PHARM REV CODE 250: Performed by: ANESTHESIOLOGY

## 2021-04-20 PROCEDURE — 87040 BLOOD CULTURE FOR BACTERIA: CPT | Performed by: STUDENT IN AN ORGANIZED HEALTH CARE EDUCATION/TRAINING PROGRAM

## 2021-04-20 PROCEDURE — 63600175 PHARM REV CODE 636 W HCPCS: Performed by: HOSPITALIST

## 2021-04-20 PROCEDURE — 25500020 PHARM REV CODE 255: Performed by: UROLOGY

## 2021-04-20 PROCEDURE — 99233 PR SUBSEQUENT HOSPITAL CARE,LEVL III: ICD-10-PCS | Mod: ,,, | Performed by: HOSPITALIST

## 2021-04-20 PROCEDURE — U0002 COVID-19 LAB TEST NON-CDC: HCPCS | Mod: 91 | Performed by: HOSPITALIST

## 2021-04-20 PROCEDURE — 83735 ASSAY OF MAGNESIUM: CPT | Performed by: STUDENT IN AN ORGANIZED HEALTH CARE EDUCATION/TRAINING PROGRAM

## 2021-04-20 PROCEDURE — 37000008 HC ANESTHESIA 1ST 15 MINUTES: Performed by: UROLOGY

## 2021-04-20 PROCEDURE — 85025 COMPLETE CBC W/AUTO DIFF WBC: CPT | Performed by: STUDENT IN AN ORGANIZED HEALTH CARE EDUCATION/TRAINING PROGRAM

## 2021-04-20 PROCEDURE — 84100 ASSAY OF PHOSPHORUS: CPT | Performed by: STUDENT IN AN ORGANIZED HEALTH CARE EDUCATION/TRAINING PROGRAM

## 2021-04-20 PROCEDURE — 80202 ASSAY OF VANCOMYCIN: CPT | Performed by: INTERNAL MEDICINE

## 2021-04-20 PROCEDURE — 74425 PR UROGRAPHY, ANTEGRADE, W/SUPV & INT: ICD-10-PCS | Mod: 26,59,, | Performed by: UROLOGY

## 2021-04-20 PROCEDURE — 36000706: Performed by: UROLOGY

## 2021-04-20 PROCEDURE — 50690 INJECTION FOR URETER X-RAY: CPT | Mod: ,,, | Performed by: UROLOGY

## 2021-04-20 PROCEDURE — 80053 COMPREHEN METABOLIC PANEL: CPT | Performed by: STUDENT IN AN ORGANIZED HEALTH CARE EDUCATION/TRAINING PROGRAM

## 2021-04-20 PROCEDURE — 50431 PR INJECTION PX NEPHROSTOGRAM &/ URETEROGRAM, EXISTING ACCESS, INCL GUID, S&I: ICD-10-PCS | Mod: 51,LT,, | Performed by: UROLOGY

## 2021-04-20 PROCEDURE — D9220A PRA ANESTHESIA: ICD-10-PCS | Mod: ANES,,, | Performed by: ANESTHESIOLOGY

## 2021-04-20 PROCEDURE — 20600001 HC STEP DOWN PRIVATE ROOM

## 2021-04-20 PROCEDURE — 50431 NJX PX NFROSGRM &/URTRGRM: CPT | Mod: 51,LT,, | Performed by: UROLOGY

## 2021-04-20 PROCEDURE — 36415 COLL VENOUS BLD VENIPUNCTURE: CPT | Performed by: INTERNAL MEDICINE

## 2021-04-20 PROCEDURE — 50690 PR INJECT RETROGRADE/CONDUIT X-RAY: ICD-10-PCS | Mod: ,,, | Performed by: UROLOGY

## 2021-04-20 PROCEDURE — 36000707: Performed by: UROLOGY

## 2021-04-20 PROCEDURE — 99223 1ST HOSP IP/OBS HIGH 75: CPT | Mod: ,,, | Performed by: PHYSICIAN ASSISTANT

## 2021-04-20 PROCEDURE — 25000003 PHARM REV CODE 250: Performed by: NURSE ANESTHETIST, CERTIFIED REGISTERED

## 2021-04-20 PROCEDURE — 37000009 HC ANESTHESIA EA ADD 15 MINS: Performed by: UROLOGY

## 2021-04-20 PROCEDURE — 71000044 HC DOSC ROUTINE RECOVERY FIRST HOUR: Performed by: UROLOGY

## 2021-04-20 PROCEDURE — 71000015 HC POSTOP RECOV 1ST HR: Performed by: UROLOGY

## 2021-04-20 PROCEDURE — 36415 COLL VENOUS BLD VENIPUNCTURE: CPT | Performed by: STUDENT IN AN ORGANIZED HEALTH CARE EDUCATION/TRAINING PROGRAM

## 2021-04-20 PROCEDURE — 74425 UROGRAPHY ANTEGRADE RS&I: CPT | Mod: 26,59,, | Performed by: UROLOGY

## 2021-04-20 PROCEDURE — 99233 SBSQ HOSP IP/OBS HIGH 50: CPT | Mod: ,,, | Performed by: HOSPITALIST

## 2021-04-20 PROCEDURE — 63600175 PHARM REV CODE 636 W HCPCS: Performed by: NURSE ANESTHETIST, CERTIFIED REGISTERED

## 2021-04-20 PROCEDURE — 99223 PR INITIAL HOSPITAL CARE,LEVL III: ICD-10-PCS | Mod: ,,, | Performed by: PHYSICIAN ASSISTANT

## 2021-04-20 PROCEDURE — 63600175 PHARM REV CODE 636 W HCPCS: Performed by: STUDENT IN AN ORGANIZED HEALTH CARE EDUCATION/TRAINING PROGRAM

## 2021-04-20 PROCEDURE — 80048 BASIC METABOLIC PNL TOTAL CA: CPT | Performed by: STUDENT IN AN ORGANIZED HEALTH CARE EDUCATION/TRAINING PROGRAM

## 2021-04-20 PROCEDURE — D9220A PRA ANESTHESIA: ICD-10-PCS | Mod: CRNA,,, | Performed by: NURSE ANESTHETIST, CERTIFIED REGISTERED

## 2021-04-20 PROCEDURE — D9220A PRA ANESTHESIA: Mod: CRNA,,, | Performed by: NURSE ANESTHETIST, CERTIFIED REGISTERED

## 2021-04-20 PROCEDURE — 63600175 PHARM REV CODE 636 W HCPCS: Performed by: PHYSICIAN ASSISTANT

## 2021-04-20 PROCEDURE — D9220A PRA ANESTHESIA: Mod: ANES,,, | Performed by: ANESTHESIOLOGY

## 2021-04-20 PROCEDURE — 25000003 PHARM REV CODE 250: Performed by: STUDENT IN AN ORGANIZED HEALTH CARE EDUCATION/TRAINING PROGRAM

## 2021-04-20 PROCEDURE — 63600175 PHARM REV CODE 636 W HCPCS: Performed by: ANESTHESIOLOGY

## 2021-04-20 RX ORDER — SODIUM CHLORIDE 9 MG/ML
INJECTION, SOLUTION INTRAVENOUS CONTINUOUS
Status: ACTIVE | OUTPATIENT
Start: 2021-04-20 | End: 2021-04-20

## 2021-04-20 RX ORDER — CEFEPIME HYDROCHLORIDE 2 G/1
2 INJECTION, POWDER, FOR SOLUTION INTRAVENOUS
Status: DISCONTINUED | OUTPATIENT
Start: 2021-04-20 | End: 2021-04-29 | Stop reason: HOSPADM

## 2021-04-20 RX ORDER — OXYCODONE HYDROCHLORIDE 5 MG/1
5 TABLET ORAL
Status: DISCONTINUED | OUTPATIENT
Start: 2021-04-20 | End: 2021-04-23

## 2021-04-20 RX ORDER — ONDANSETRON 2 MG/ML
4 INJECTION INTRAMUSCULAR; INTRAVENOUS DAILY PRN
Status: DISCONTINUED | OUTPATIENT
Start: 2021-04-20 | End: 2021-04-21

## 2021-04-20 RX ORDER — FENTANYL CITRATE 50 UG/ML
25 INJECTION, SOLUTION INTRAMUSCULAR; INTRAVENOUS EVERY 5 MIN PRN
Status: DISCONTINUED | OUTPATIENT
Start: 2021-04-20 | End: 2021-04-21

## 2021-04-20 RX ORDER — MAGNESIUM SULFATE HEPTAHYDRATE 40 MG/ML
2 INJECTION, SOLUTION INTRAVENOUS ONCE
Status: DISCONTINUED | OUTPATIENT
Start: 2021-04-20 | End: 2021-04-21

## 2021-04-20 RX ORDER — ONDANSETRON 2 MG/ML
INJECTION INTRAMUSCULAR; INTRAVENOUS
Status: DISCONTINUED | OUTPATIENT
Start: 2021-04-20 | End: 2021-04-20

## 2021-04-20 RX ORDER — PHENYLEPHRINE HYDROCHLORIDE 10 MG/ML
INJECTION INTRAVENOUS
Status: DISCONTINUED | OUTPATIENT
Start: 2021-04-20 | End: 2021-04-20

## 2021-04-20 RX ORDER — ROCURONIUM BROMIDE 10 MG/ML
INJECTION, SOLUTION INTRAVENOUS
Status: DISCONTINUED | OUTPATIENT
Start: 2021-04-20 | End: 2021-04-20

## 2021-04-20 RX ORDER — FENTANYL CITRATE 50 UG/ML
INJECTION, SOLUTION INTRAMUSCULAR; INTRAVENOUS
Status: DISCONTINUED | OUTPATIENT
Start: 2021-04-20 | End: 2021-04-20

## 2021-04-20 RX ORDER — PROPOFOL 10 MG/ML
VIAL (ML) INTRAVENOUS
Status: DISCONTINUED | OUTPATIENT
Start: 2021-04-20 | End: 2021-04-20

## 2021-04-20 RX ORDER — LIDOCAINE HYDROCHLORIDE 20 MG/ML
INJECTION INTRAVENOUS
Status: DISCONTINUED | OUTPATIENT
Start: 2021-04-20 | End: 2021-04-20

## 2021-04-20 RX ORDER — NEOSTIGMINE METHYLSULFATE 1 MG/ML
INJECTION, SOLUTION INTRAVENOUS
Status: DISCONTINUED | OUTPATIENT
Start: 2021-04-20 | End: 2021-04-20

## 2021-04-20 RX ADMIN — FENTANYL CITRATE 25 MCG: 50 INJECTION INTRAMUSCULAR; INTRAVENOUS at 09:04

## 2021-04-20 RX ADMIN — CEFEPIME 2 G: 2 INJECTION, POWDER, FOR SOLUTION INTRAVENOUS at 11:04

## 2021-04-20 RX ADMIN — OXYCODONE HYDROCHLORIDE 5 MG: 5 TABLET ORAL at 10:04

## 2021-04-20 RX ADMIN — PIPERACILLIN AND TAZOBACTAM 4.5 G: 4; .5 INJECTION, POWDER, LYOPHILIZED, FOR SOLUTION INTRAVENOUS; PARENTERAL at 04:04

## 2021-04-20 RX ADMIN — VANCOMYCIN HYDROCHLORIDE 750 MG: 750 INJECTION, POWDER, LYOPHILIZED, FOR SOLUTION INTRAVENOUS at 02:04

## 2021-04-20 RX ADMIN — PROPOFOL 40 MG: 10 INJECTION, EMULSION INTRAVENOUS at 08:04

## 2021-04-20 RX ADMIN — NEOSTIGMINE METHYLSULFATE 2 MG: 1 INJECTION INTRAVENOUS at 09:04

## 2021-04-20 RX ADMIN — FENTANYL CITRATE 25 MCG: 50 INJECTION, SOLUTION INTRAMUSCULAR; INTRAVENOUS at 08:04

## 2021-04-20 RX ADMIN — PHENYLEPHRINE HYDROCHLORIDE 200 MCG: 10 INJECTION INTRAVENOUS at 08:04

## 2021-04-20 RX ADMIN — ROCURONIUM BROMIDE 20 MG: 10 INJECTION, SOLUTION INTRAVENOUS at 08:04

## 2021-04-20 RX ADMIN — LIDOCAINE HYDROCHLORIDE 75 MG: 20 INJECTION, SOLUTION INTRAVENOUS at 08:04

## 2021-04-20 RX ADMIN — PHENYLEPHRINE HYDROCHLORIDE 200 MCG: 10 INJECTION INTRAVENOUS at 09:04

## 2021-04-20 RX ADMIN — APIXABAN 5 MG: 5 TABLET, FILM COATED ORAL at 08:04

## 2021-04-20 RX ADMIN — GLYCOPYRROLATE 0.4 MG: 0.2 INJECTION, SOLUTION INTRAMUSCULAR; INTRAVITREAL at 09:04

## 2021-04-20 RX ADMIN — PROPOFOL 100 MG: 10 INJECTION, EMULSION INTRAVENOUS at 08:04

## 2021-04-20 RX ADMIN — ONDANSETRON 4 MG: 2 INJECTION, SOLUTION INTRAMUSCULAR; INTRAVENOUS at 09:04

## 2021-04-21 PROBLEM — F32.A DEPRESSION: Status: ACTIVE | Noted: 2021-04-21

## 2021-04-21 PROBLEM — N39.0 UTI (URINARY TRACT INFECTION): Status: ACTIVE | Noted: 2021-04-21

## 2021-04-21 LAB
ALBUMIN SERPL BCP-MCNC: 2 G/DL (ref 3.5–5.2)
ALP SERPL-CCNC: 132 U/L (ref 55–135)
ALT SERPL W/O P-5'-P-CCNC: 13 U/L (ref 10–44)
ANION GAP SERPL CALC-SCNC: 11 MMOL/L (ref 8–16)
ANION GAP SERPL CALC-SCNC: 9 MMOL/L (ref 8–16)
ASCENDING AORTA: 2.81 CM
AST SERPL-CCNC: 22 U/L (ref 10–40)
AV INDEX (PROSTH): 0.89
AV MEAN GRADIENT: 3 MMHG
AV PEAK GRADIENT: 5 MMHG
AV VELOCITY RATIO: 0.91
BACTERIA SPEC ANAEROBE CULT: NORMAL
BASOPHILS # BLD AUTO: 0.03 K/UL (ref 0–0.2)
BASOPHILS NFR BLD: 0.6 % (ref 0–1.9)
BILIRUB SERPL-MCNC: 0.3 MG/DL (ref 0.1–1)
BSA FOR ECHO PROCEDURE: 1.79 M2
BUN SERPL-MCNC: 12 MG/DL (ref 6–20)
BUN SERPL-MCNC: 14 MG/DL (ref 6–20)
CALCIUM SERPL-MCNC: 8 MG/DL (ref 8.7–10.5)
CALCIUM SERPL-MCNC: 8.1 MG/DL (ref 8.7–10.5)
CHLORIDE SERPL-SCNC: 107 MMOL/L (ref 95–110)
CHLORIDE SERPL-SCNC: 113 MMOL/L (ref 95–110)
CO2 SERPL-SCNC: 21 MMOL/L (ref 23–29)
CO2 SERPL-SCNC: 23 MMOL/L (ref 23–29)
CREAT SERPL-MCNC: 1.3 MG/DL (ref 0.5–1.4)
CREAT SERPL-MCNC: 1.4 MG/DL (ref 0.5–1.4)
CV ECHO LV RWT: 0.31 CM
DIFFERENTIAL METHOD: ABNORMAL
DOP CALC AO PEAK VEL: 1.16 M/S
DOP CALC AO VTI: 21.26 CM
DOP CALC LVOT PEAK VEL: 1.06 M/S
DOP CALCLVOT PEAK VEL VTI: 18.99 CM
E WAVE DECELERATION TIME: 178.89 MSEC
E/A RATIO: 0.97
E/E' RATIO: 6.38 M/S
ECHO LV POSTERIOR WALL: 0.63 CM (ref 0.6–1.1)
EJECTION FRACTION: 65 %
EOSINOPHIL # BLD AUTO: 0.2 K/UL (ref 0–0.5)
EOSINOPHIL NFR BLD: 3.2 % (ref 0–8)
ERYTHROCYTE [DISTWIDTH] IN BLOOD BY AUTOMATED COUNT: 16 % (ref 11.5–14.5)
EST. GFR  (AFRICAN AMERICAN): 47.8 ML/MIN/1.73 M^2
EST. GFR  (AFRICAN AMERICAN): 52.3 ML/MIN/1.73 M^2
EST. GFR  (NON AFRICAN AMERICAN): 41.4 ML/MIN/1.73 M^2
EST. GFR  (NON AFRICAN AMERICAN): 45.3 ML/MIN/1.73 M^2
FRACTIONAL SHORTENING: 32 % (ref 28–44)
GLUCOSE SERPL-MCNC: 100 MG/DL (ref 70–110)
GLUCOSE SERPL-MCNC: 125 MG/DL (ref 70–110)
HCT VFR BLD AUTO: 36 % (ref 37–48.5)
HGB BLD-MCNC: 10.8 G/DL (ref 12–16)
IMM GRANULOCYTES # BLD AUTO: 0.02 K/UL (ref 0–0.04)
IMM GRANULOCYTES NFR BLD AUTO: 0.4 % (ref 0–0.5)
INTERVENTRICULAR SEPTUM: 0.6 CM (ref 0.6–1.1)
LA MAJOR: 4.11 CM
LA MINOR: 3.89 CM
LA WIDTH: 3.43 CM
LEFT ATRIUM SIZE: 2.27 CM
LEFT ATRIUM VOLUME INDEX MOD: 20.5 ML/M2
LEFT ATRIUM VOLUME INDEX: 14.7 ML/M2
LEFT ATRIUM VOLUME MOD: 36.96 CM3
LEFT ATRIUM VOLUME: 26.45 CM3
LEFT INTERNAL DIMENSION IN SYSTOLE: 2.76 CM (ref 2.1–4)
LEFT VENTRICLE DIASTOLIC VOLUME INDEX: 39.84 ML/M2
LEFT VENTRICLE DIASTOLIC VOLUME: 71.72 ML
LEFT VENTRICLE MASS INDEX: 38 G/M2
LEFT VENTRICLE SYSTOLIC VOLUME INDEX: 15.9 ML/M2
LEFT VENTRICLE SYSTOLIC VOLUME: 28.58 ML
LEFT VENTRICULAR INTERNAL DIMENSION IN DIASTOLE: 4.04 CM (ref 3.5–6)
LEFT VENTRICULAR MASS: 67.51 G
LV LATERAL E/E' RATIO: 6.09 M/S
LV SEPTAL E/E' RATIO: 6.7 M/S
LYMPHOCYTES # BLD AUTO: 1 K/UL (ref 1–4.8)
LYMPHOCYTES NFR BLD: 20.3 % (ref 18–48)
MAGNESIUM SERPL-MCNC: 1.3 MG/DL (ref 1.6–2.6)
MCH RBC QN AUTO: 27.9 PG (ref 27–31)
MCHC RBC AUTO-ENTMCNC: 30 G/DL (ref 32–36)
MCV RBC AUTO: 93 FL (ref 82–98)
MONOCYTES # BLD AUTO: 0.4 K/UL (ref 0.3–1)
MONOCYTES NFR BLD: 9.1 % (ref 4–15)
MV A" WAVE DURATION": 10.56 MSEC
MV PEAK A VEL: 0.69 M/S
MV PEAK E VEL: 0.67 M/S
MV STENOSIS PRESSURE HALF TIME: 51.88 MS
MV VALVE AREA P 1/2 METHOD: 4.24 CM2
NEUTROPHILS # BLD AUTO: 3.2 K/UL (ref 1.8–7.7)
NEUTROPHILS NFR BLD: 66.4 % (ref 38–73)
NRBC BLD-RTO: 0 /100 WBC
PHOSPHATE SERPL-MCNC: 2.3 MG/DL (ref 2.7–4.5)
PLATELET # BLD AUTO: 164 K/UL (ref 150–450)
PMV BLD AUTO: 11.5 FL (ref 9.2–12.9)
POCT GLUCOSE: 107 MG/DL (ref 70–110)
POCT GLUCOSE: 143 MG/DL (ref 70–110)
POTASSIUM SERPL-SCNC: 3.5 MMOL/L (ref 3.5–5.1)
POTASSIUM SERPL-SCNC: 3.7 MMOL/L (ref 3.5–5.1)
PROT SERPL-MCNC: 5.9 G/DL (ref 6–8.4)
PULM VEIN S/D RATIO: 1.14
PV PEAK D VEL: 0.36 M/S
PV PEAK S VEL: 0.41 M/S
RA MAJOR: 3.68 CM
RA PRESSURE: 3 MMHG
RA WIDTH: 2.71 CM
RBC # BLD AUTO: 3.87 M/UL (ref 4–5.4)
RIGHT VENTRICULAR END-DIASTOLIC DIMENSION: 3.03 CM
RV TISSUE DOPPLER FREE WALL SYSTOLIC VELOCITY 1 (APICAL 4 CHAMBER VIEW): 11.8 CM/S
SINUS: 2.77 CM
SODIUM SERPL-SCNC: 139 MMOL/L (ref 136–145)
SODIUM SERPL-SCNC: 145 MMOL/L (ref 136–145)
STJ: 2.76 CM
TDI LATERAL: 0.11 M/S
TDI SEPTAL: 0.1 M/S
TDI: 0.11 M/S
TRICUSPID ANNULAR PLANE SYSTOLIC EXCURSION: 1.79 CM
VANCOMYCIN SERPL-MCNC: 19.8 UG/ML
WBC # BLD AUTO: 4.74 K/UL (ref 3.9–12.7)

## 2021-04-21 PROCEDURE — 36415 COLL VENOUS BLD VENIPUNCTURE: CPT | Performed by: STUDENT IN AN ORGANIZED HEALTH CARE EDUCATION/TRAINING PROGRAM

## 2021-04-21 PROCEDURE — 99233 SBSQ HOSP IP/OBS HIGH 50: CPT | Mod: ,,, | Performed by: PHYSICIAN ASSISTANT

## 2021-04-21 PROCEDURE — 85025 COMPLETE CBC W/AUTO DIFF WBC: CPT | Performed by: STUDENT IN AN ORGANIZED HEALTH CARE EDUCATION/TRAINING PROGRAM

## 2021-04-21 PROCEDURE — 63600175 PHARM REV CODE 636 W HCPCS: Performed by: PHYSICIAN ASSISTANT

## 2021-04-21 PROCEDURE — 99233 SBSQ HOSP IP/OBS HIGH 50: CPT | Mod: ,,, | Performed by: HOSPITALIST

## 2021-04-21 PROCEDURE — 99233 PR SUBSEQUENT HOSPITAL CARE,LEVL III: ICD-10-PCS | Mod: ,,, | Performed by: PHYSICIAN ASSISTANT

## 2021-04-21 PROCEDURE — 80048 BASIC METABOLIC PNL TOTAL CA: CPT | Performed by: STUDENT IN AN ORGANIZED HEALTH CARE EDUCATION/TRAINING PROGRAM

## 2021-04-21 PROCEDURE — 36415 COLL VENOUS BLD VENIPUNCTURE: CPT | Performed by: HOSPITALIST

## 2021-04-21 PROCEDURE — 97535 SELF CARE MNGMENT TRAINING: CPT

## 2021-04-21 PROCEDURE — 25000003 PHARM REV CODE 250: Performed by: STUDENT IN AN ORGANIZED HEALTH CARE EDUCATION/TRAINING PROGRAM

## 2021-04-21 PROCEDURE — 90792 PSYCH DIAG EVAL W/MED SRVCS: CPT | Mod: AF,HB,, | Performed by: PSYCHIATRY & NEUROLOGY

## 2021-04-21 PROCEDURE — 83735 ASSAY OF MAGNESIUM: CPT | Performed by: STUDENT IN AN ORGANIZED HEALTH CARE EDUCATION/TRAINING PROGRAM

## 2021-04-21 PROCEDURE — 20600001 HC STEP DOWN PRIVATE ROOM

## 2021-04-21 PROCEDURE — 99233 PR SUBSEQUENT HOSPITAL CARE,LEVL III: ICD-10-PCS | Mod: ,,, | Performed by: HOSPITALIST

## 2021-04-21 PROCEDURE — 80202 ASSAY OF VANCOMYCIN: CPT | Performed by: HOSPITALIST

## 2021-04-21 PROCEDURE — 63600175 PHARM REV CODE 636 W HCPCS: Performed by: HOSPITALIST

## 2021-04-21 PROCEDURE — 84100 ASSAY OF PHOSPHORUS: CPT | Performed by: STUDENT IN AN ORGANIZED HEALTH CARE EDUCATION/TRAINING PROGRAM

## 2021-04-21 PROCEDURE — 90792 PR PSYCHIATRIC DIAGNOSTIC EVALUATION W/MEDICAL SERVICES: ICD-10-PCS | Mod: AF,HB,, | Performed by: PSYCHIATRY & NEUROLOGY

## 2021-04-21 PROCEDURE — 80053 COMPREHEN METABOLIC PANEL: CPT | Performed by: STUDENT IN AN ORGANIZED HEALTH CARE EDUCATION/TRAINING PROGRAM

## 2021-04-21 PROCEDURE — 25000003 PHARM REV CODE 250: Performed by: HOSPITALIST

## 2021-04-21 PROCEDURE — 63600175 PHARM REV CODE 636 W HCPCS: Performed by: STUDENT IN AN ORGANIZED HEALTH CARE EDUCATION/TRAINING PROGRAM

## 2021-04-21 RX ORDER — SODIUM,POTASSIUM PHOSPHATES 280-250MG
2 POWDER IN PACKET (EA) ORAL ONCE
Status: COMPLETED | OUTPATIENT
Start: 2021-04-21 | End: 2021-04-21

## 2021-04-21 RX ORDER — MIRTAZAPINE 15 MG/1
15 TABLET, ORALLY DISINTEGRATING ORAL NIGHTLY
Status: DISCONTINUED | OUTPATIENT
Start: 2021-04-21 | End: 2021-04-29 | Stop reason: HOSPADM

## 2021-04-21 RX ORDER — SODIUM CHLORIDE 9 MG/ML
INJECTION, SOLUTION INTRAVENOUS CONTINUOUS
Status: ACTIVE | OUTPATIENT
Start: 2021-04-21 | End: 2021-04-22

## 2021-04-21 RX ORDER — MAGNESIUM SULFATE HEPTAHYDRATE 40 MG/ML
2 INJECTION, SOLUTION INTRAVENOUS ONCE
Status: COMPLETED | OUTPATIENT
Start: 2021-04-21 | End: 2021-04-21

## 2021-04-21 RX ADMIN — MAGNESIUM SULFATE 2 G: 2 INJECTION INTRAVENOUS at 09:04

## 2021-04-21 RX ADMIN — SODIUM CHLORIDE: 0.9 INJECTION, SOLUTION INTRAVENOUS at 06:04

## 2021-04-21 RX ADMIN — PANTOPRAZOLE SODIUM 40 MG: 40 TABLET, DELAYED RELEASE ORAL at 09:04

## 2021-04-21 RX ADMIN — MIRTAZAPINE 15 MG: 15 TABLET, ORALLY DISINTEGRATING ORAL at 08:04

## 2021-04-21 RX ADMIN — POTASSIUM & SODIUM PHOSPHATES POWDER PACK 280-160-250 MG 2 PACKET: 280-160-250 PACK at 09:04

## 2021-04-21 RX ADMIN — APIXABAN 5 MG: 5 TABLET, FILM COATED ORAL at 08:04

## 2021-04-21 RX ADMIN — CEFEPIME 2 G: 2 INJECTION, POWDER, FOR SOLUTION INTRAVENOUS at 12:04

## 2021-04-21 RX ADMIN — CEFEPIME 2 G: 2 INJECTION, POWDER, FOR SOLUTION INTRAVENOUS at 11:04

## 2021-04-21 RX ADMIN — APIXABAN 5 MG: 5 TABLET, FILM COATED ORAL at 09:04

## 2021-04-21 RX ADMIN — VANCOMYCIN HYDROCHLORIDE 750 MG: 750 INJECTION, POWDER, LYOPHILIZED, FOR SOLUTION INTRAVENOUS at 11:04

## 2021-04-22 LAB
ANION GAP SERPL CALC-SCNC: 8 MMOL/L (ref 8–16)
BASOPHILS # BLD AUTO: 0.04 K/UL (ref 0–0.2)
BASOPHILS NFR BLD: 0.7 % (ref 0–1.9)
BUN SERPL-MCNC: 13 MG/DL (ref 6–20)
CALCIUM SERPL-MCNC: 8.1 MG/DL (ref 8.7–10.5)
CHLORIDE SERPL-SCNC: 112 MMOL/L (ref 95–110)
CO2 SERPL-SCNC: 20 MMOL/L (ref 23–29)
CREAT SERPL-MCNC: 1.2 MG/DL (ref 0.5–1.4)
DIFFERENTIAL METHOD: ABNORMAL
EOSINOPHIL # BLD AUTO: 0.2 K/UL (ref 0–0.5)
EOSINOPHIL NFR BLD: 3.8 % (ref 0–8)
ERYTHROCYTE [DISTWIDTH] IN BLOOD BY AUTOMATED COUNT: 15.8 % (ref 11.5–14.5)
EST. GFR  (AFRICAN AMERICAN): 57.6 ML/MIN/1.73 M^2
EST. GFR  (NON AFRICAN AMERICAN): 49.9 ML/MIN/1.73 M^2
GLUCOSE SERPL-MCNC: 75 MG/DL (ref 70–110)
HCT VFR BLD AUTO: 37.3 % (ref 37–48.5)
HGB BLD-MCNC: 11 G/DL (ref 12–16)
IMM GRANULOCYTES # BLD AUTO: 0.03 K/UL (ref 0–0.04)
IMM GRANULOCYTES NFR BLD AUTO: 0.5 % (ref 0–0.5)
LYMPHOCYTES # BLD AUTO: 1.3 K/UL (ref 1–4.8)
LYMPHOCYTES NFR BLD: 22.8 % (ref 18–48)
MCH RBC QN AUTO: 28.1 PG (ref 27–31)
MCHC RBC AUTO-ENTMCNC: 29.5 G/DL (ref 32–36)
MCV RBC AUTO: 95 FL (ref 82–98)
MONOCYTES # BLD AUTO: 0.6 K/UL (ref 0.3–1)
MONOCYTES NFR BLD: 10.5 % (ref 4–15)
NEUTROPHILS # BLD AUTO: 3.6 K/UL (ref 1.8–7.7)
NEUTROPHILS NFR BLD: 61.7 % (ref 38–73)
NRBC BLD-RTO: 0 /100 WBC
PLATELET # BLD AUTO: 167 K/UL (ref 150–450)
PMV BLD AUTO: 11.1 FL (ref 9.2–12.9)
POCT GLUCOSE: 104 MG/DL (ref 70–110)
POCT GLUCOSE: 253 MG/DL (ref 70–110)
POTASSIUM SERPL-SCNC: 3.9 MMOL/L (ref 3.5–5.1)
RBC # BLD AUTO: 3.92 M/UL (ref 4–5.4)
SODIUM SERPL-SCNC: 140 MMOL/L (ref 136–145)
WBC # BLD AUTO: 5.83 K/UL (ref 3.9–12.7)

## 2021-04-22 PROCEDURE — 97530 THERAPEUTIC ACTIVITIES: CPT

## 2021-04-22 PROCEDURE — 99232 SBSQ HOSP IP/OBS MODERATE 35: CPT | Mod: AF,HB,, | Performed by: PSYCHIATRY & NEUROLOGY

## 2021-04-22 PROCEDURE — 25000003 PHARM REV CODE 250: Performed by: INTERNAL MEDICINE

## 2021-04-22 PROCEDURE — 99233 SBSQ HOSP IP/OBS HIGH 50: CPT | Mod: ,,, | Performed by: INTERNAL MEDICINE

## 2021-04-22 PROCEDURE — 99233 PR SUBSEQUENT HOSPITAL CARE,LEVL III: ICD-10-PCS | Mod: ,,, | Performed by: PHYSICIAN ASSISTANT

## 2021-04-22 PROCEDURE — 63600175 PHARM REV CODE 636 W HCPCS: Performed by: PHYSICIAN ASSISTANT

## 2021-04-22 PROCEDURE — 99233 PR SUBSEQUENT HOSPITAL CARE,LEVL III: ICD-10-PCS | Mod: ,,, | Performed by: INTERNAL MEDICINE

## 2021-04-22 PROCEDURE — 99233 SBSQ HOSP IP/OBS HIGH 50: CPT | Mod: ,,, | Performed by: PHYSICIAN ASSISTANT

## 2021-04-22 PROCEDURE — 80048 BASIC METABOLIC PNL TOTAL CA: CPT | Performed by: STUDENT IN AN ORGANIZED HEALTH CARE EDUCATION/TRAINING PROGRAM

## 2021-04-22 PROCEDURE — 20600001 HC STEP DOWN PRIVATE ROOM

## 2021-04-22 PROCEDURE — 36415 COLL VENOUS BLD VENIPUNCTURE: CPT | Performed by: STUDENT IN AN ORGANIZED HEALTH CARE EDUCATION/TRAINING PROGRAM

## 2021-04-22 PROCEDURE — 85025 COMPLETE CBC W/AUTO DIFF WBC: CPT | Performed by: HOSPITALIST

## 2021-04-22 PROCEDURE — 99232 PR SUBSEQUENT HOSPITAL CARE,LEVL II: ICD-10-PCS | Mod: AF,HB,, | Performed by: PSYCHIATRY & NEUROLOGY

## 2021-04-22 PROCEDURE — 25000003 PHARM REV CODE 250: Performed by: STUDENT IN AN ORGANIZED HEALTH CARE EDUCATION/TRAINING PROGRAM

## 2021-04-22 RX ORDER — SODIUM CHLORIDE 9 MG/ML
INJECTION, SOLUTION INTRAVENOUS CONTINUOUS
Status: ACTIVE | OUTPATIENT
Start: 2021-04-22 | End: 2021-04-22

## 2021-04-22 RX ADMIN — SODIUM CHLORIDE 35 ML/HR: 0.9 INJECTION, SOLUTION INTRAVENOUS at 07:04

## 2021-04-22 RX ADMIN — PANTOPRAZOLE SODIUM 40 MG: 40 TABLET, DELAYED RELEASE ORAL at 08:04

## 2021-04-22 RX ADMIN — APIXABAN 5 MG: 5 TABLET, FILM COATED ORAL at 08:04

## 2021-04-22 RX ADMIN — SODIUM CHLORIDE: 0.9 INJECTION, SOLUTION INTRAVENOUS at 11:04

## 2021-04-22 RX ADMIN — SODIUM CHLORIDE 35 ML/HR: 0.9 INJECTION, SOLUTION INTRAVENOUS at 05:04

## 2021-04-22 RX ADMIN — SODIUM CHLORIDE 100 ML/HR: 0.9 INJECTION, SOLUTION INTRAVENOUS at 08:04

## 2021-04-22 RX ADMIN — MIRTAZAPINE 15 MG: 15 TABLET, ORALLY DISINTEGRATING ORAL at 08:04

## 2021-04-22 RX ADMIN — CEFEPIME 2 G: 2 INJECTION, POWDER, FOR SOLUTION INTRAVENOUS at 12:04

## 2021-04-23 LAB
ANION GAP SERPL CALC-SCNC: 5 MMOL/L (ref 8–16)
BASOPHILS # BLD AUTO: 0.03 K/UL (ref 0–0.2)
BASOPHILS NFR BLD: 0.5 % (ref 0–1.9)
BUN SERPL-MCNC: 14 MG/DL (ref 6–20)
CALCIUM SERPL-MCNC: 8 MG/DL (ref 8.7–10.5)
CHLORIDE SERPL-SCNC: 117 MMOL/L (ref 95–110)
CO2 SERPL-SCNC: 18 MMOL/L (ref 23–29)
CREAT SERPL-MCNC: 1.3 MG/DL (ref 0.5–1.4)
DIFFERENTIAL METHOD: ABNORMAL
EOSINOPHIL # BLD AUTO: 0.2 K/UL (ref 0–0.5)
EOSINOPHIL NFR BLD: 3.2 % (ref 0–8)
ERYTHROCYTE [DISTWIDTH] IN BLOOD BY AUTOMATED COUNT: 15.7 % (ref 11.5–14.5)
EST. GFR  (AFRICAN AMERICAN): 52.3 ML/MIN/1.73 M^2
EST. GFR  (NON AFRICAN AMERICAN): 45.3 ML/MIN/1.73 M^2
GLUCOSE SERPL-MCNC: 102 MG/DL (ref 70–110)
HCT VFR BLD AUTO: 37.8 % (ref 37–48.5)
HGB BLD-MCNC: 10.7 G/DL (ref 12–16)
IMM GRANULOCYTES # BLD AUTO: 0.02 K/UL (ref 0–0.04)
IMM GRANULOCYTES NFR BLD AUTO: 0.3 % (ref 0–0.5)
LYMPHOCYTES # BLD AUTO: 1.2 K/UL (ref 1–4.8)
LYMPHOCYTES NFR BLD: 20.8 % (ref 18–48)
MCH RBC QN AUTO: 28.2 PG (ref 27–31)
MCHC RBC AUTO-ENTMCNC: 28.3 G/DL (ref 32–36)
MCV RBC AUTO: 100 FL (ref 82–98)
MONOCYTES # BLD AUTO: 0.5 K/UL (ref 0.3–1)
MONOCYTES NFR BLD: 8.4 % (ref 4–15)
NEUTROPHILS # BLD AUTO: 4 K/UL (ref 1.8–7.7)
NEUTROPHILS NFR BLD: 66.8 % (ref 38–73)
NRBC BLD-RTO: 0 /100 WBC
PLATELET # BLD AUTO: 153 K/UL (ref 150–450)
PMV BLD AUTO: 10.5 FL (ref 9.2–12.9)
POCT GLUCOSE: 106 MG/DL (ref 70–110)
POTASSIUM SERPL-SCNC: 3.2 MMOL/L (ref 3.5–5.1)
RBC # BLD AUTO: 3.79 M/UL (ref 4–5.4)
SODIUM SERPL-SCNC: 140 MMOL/L (ref 136–145)
WBC # BLD AUTO: 5.97 K/UL (ref 3.9–12.7)

## 2021-04-23 PROCEDURE — 80048 BASIC METABOLIC PNL TOTAL CA: CPT | Performed by: STUDENT IN AN ORGANIZED HEALTH CARE EDUCATION/TRAINING PROGRAM

## 2021-04-23 PROCEDURE — 97110 THERAPEUTIC EXERCISES: CPT | Mod: CO

## 2021-04-23 PROCEDURE — 99233 SBSQ HOSP IP/OBS HIGH 50: CPT | Mod: ,,, | Performed by: INTERNAL MEDICINE

## 2021-04-23 PROCEDURE — 85025 COMPLETE CBC W/AUTO DIFF WBC: CPT | Performed by: HOSPITALIST

## 2021-04-23 PROCEDURE — 99233 PR SUBSEQUENT HOSPITAL CARE,LEVL III: ICD-10-PCS | Mod: AF,HB,, | Performed by: PSYCHIATRY & NEUROLOGY

## 2021-04-23 PROCEDURE — 97112 NEUROMUSCULAR REEDUCATION: CPT

## 2021-04-23 PROCEDURE — 99233 PR SUBSEQUENT HOSPITAL CARE,LEVL III: ICD-10-PCS | Mod: ,,, | Performed by: INTERNAL MEDICINE

## 2021-04-23 PROCEDURE — 36415 COLL VENOUS BLD VENIPUNCTURE: CPT | Performed by: STUDENT IN AN ORGANIZED HEALTH CARE EDUCATION/TRAINING PROGRAM

## 2021-04-23 PROCEDURE — 25000003 PHARM REV CODE 250: Performed by: STUDENT IN AN ORGANIZED HEALTH CARE EDUCATION/TRAINING PROGRAM

## 2021-04-23 PROCEDURE — 63600175 PHARM REV CODE 636 W HCPCS: Performed by: PHYSICIAN ASSISTANT

## 2021-04-23 PROCEDURE — 99233 SBSQ HOSP IP/OBS HIGH 50: CPT | Mod: AF,HB,, | Performed by: PSYCHIATRY & NEUROLOGY

## 2021-04-23 PROCEDURE — 25000003 PHARM REV CODE 250: Performed by: INTERNAL MEDICINE

## 2021-04-23 PROCEDURE — 97530 THERAPEUTIC ACTIVITIES: CPT

## 2021-04-23 PROCEDURE — 20600001 HC STEP DOWN PRIVATE ROOM

## 2021-04-23 RX ORDER — CEFEPIME HYDROCHLORIDE 2 G/1
2 INJECTION, POWDER, FOR SOLUTION INTRAVENOUS EVERY 12 HOURS
Status: ON HOLD
Start: 2021-04-23 | End: 2021-05-07 | Stop reason: HOSPADM

## 2021-04-23 RX ADMIN — SODIUM CHLORIDE 35 ML/HR: 0.9 INJECTION, SOLUTION INTRAVENOUS at 05:04

## 2021-04-23 RX ADMIN — APIXABAN 5 MG: 5 TABLET, FILM COATED ORAL at 08:04

## 2021-04-23 RX ADMIN — MIRTAZAPINE 15 MG: 15 TABLET, ORALLY DISINTEGRATING ORAL at 08:04

## 2021-04-23 RX ADMIN — PANTOPRAZOLE SODIUM 40 MG: 40 TABLET, DELAYED RELEASE ORAL at 08:04

## 2021-04-23 RX ADMIN — CEFEPIME 2 G: 2 INJECTION, POWDER, FOR SOLUTION INTRAVENOUS at 11:04

## 2021-04-23 RX ADMIN — POTASSIUM BICARBONATE 25 MEQ: 977.5 TABLET, EFFERVESCENT ORAL at 08:04

## 2021-04-23 RX ADMIN — CEFEPIME 2 G: 2 INJECTION, POWDER, FOR SOLUTION INTRAVENOUS at 12:04

## 2021-04-24 LAB
ANION GAP SERPL CALC-SCNC: 4 MMOL/L (ref 8–16)
BASOPHILS # BLD AUTO: 0.03 K/UL (ref 0–0.2)
BASOPHILS NFR BLD: 0.5 % (ref 0–1.9)
BUN SERPL-MCNC: 19 MG/DL (ref 6–20)
CALCIUM SERPL-MCNC: 7.8 MG/DL (ref 8.7–10.5)
CHLORIDE SERPL-SCNC: 123 MMOL/L (ref 95–110)
CO2 SERPL-SCNC: 17 MMOL/L (ref 23–29)
CREAT SERPL-MCNC: 1.1 MG/DL (ref 0.5–1.4)
DIFFERENTIAL METHOD: ABNORMAL
EOSINOPHIL # BLD AUTO: 0.2 K/UL (ref 0–0.5)
EOSINOPHIL NFR BLD: 3.6 % (ref 0–8)
ERYTHROCYTE [DISTWIDTH] IN BLOOD BY AUTOMATED COUNT: 15.9 % (ref 11.5–14.5)
EST. GFR  (AFRICAN AMERICAN): >60 ML/MIN/1.73 M^2
EST. GFR  (NON AFRICAN AMERICAN): 55.5 ML/MIN/1.73 M^2
GLUCOSE SERPL-MCNC: 105 MG/DL (ref 70–110)
HCT VFR BLD AUTO: 31.9 % (ref 37–48.5)
HGB BLD-MCNC: 9.2 G/DL (ref 12–16)
IMM GRANULOCYTES # BLD AUTO: 0.02 K/UL (ref 0–0.04)
IMM GRANULOCYTES NFR BLD AUTO: 0.3 % (ref 0–0.5)
LYMPHOCYTES # BLD AUTO: 1.5 K/UL (ref 1–4.8)
LYMPHOCYTES NFR BLD: 24.1 % (ref 18–48)
MCH RBC QN AUTO: 27.4 PG (ref 27–31)
MCHC RBC AUTO-ENTMCNC: 28.8 G/DL (ref 32–36)
MCV RBC AUTO: 95 FL (ref 82–98)
MONOCYTES # BLD AUTO: 0.6 K/UL (ref 0.3–1)
MONOCYTES NFR BLD: 10.1 % (ref 4–15)
NEUTROPHILS # BLD AUTO: 3.9 K/UL (ref 1.8–7.7)
NEUTROPHILS NFR BLD: 61.4 % (ref 38–73)
NRBC BLD-RTO: 0 /100 WBC
PLATELET # BLD AUTO: 178 K/UL (ref 150–450)
PMV BLD AUTO: 11 FL (ref 9.2–12.9)
POCT GLUCOSE: 117 MG/DL (ref 70–110)
POTASSIUM SERPL-SCNC: 3.6 MMOL/L (ref 3.5–5.1)
RBC # BLD AUTO: 3.36 M/UL (ref 4–5.4)
SODIUM SERPL-SCNC: 144 MMOL/L (ref 136–145)
WBC # BLD AUTO: 6.35 K/UL (ref 3.9–12.7)

## 2021-04-24 PROCEDURE — 99232 PR SUBSEQUENT HOSPITAL CARE,LEVL II: ICD-10-PCS | Mod: ,,, | Performed by: INTERNAL MEDICINE

## 2021-04-24 PROCEDURE — 97530 THERAPEUTIC ACTIVITIES: CPT

## 2021-04-24 PROCEDURE — 80048 BASIC METABOLIC PNL TOTAL CA: CPT | Performed by: STUDENT IN AN ORGANIZED HEALTH CARE EDUCATION/TRAINING PROGRAM

## 2021-04-24 PROCEDURE — 85025 COMPLETE CBC W/AUTO DIFF WBC: CPT | Performed by: HOSPITALIST

## 2021-04-24 PROCEDURE — 99232 SBSQ HOSP IP/OBS MODERATE 35: CPT | Mod: ,,, | Performed by: INTERNAL MEDICINE

## 2021-04-24 PROCEDURE — 63600175 PHARM REV CODE 636 W HCPCS: Performed by: PHYSICIAN ASSISTANT

## 2021-04-24 PROCEDURE — 97535 SELF CARE MNGMENT TRAINING: CPT

## 2021-04-24 PROCEDURE — 36415 COLL VENOUS BLD VENIPUNCTURE: CPT | Performed by: STUDENT IN AN ORGANIZED HEALTH CARE EDUCATION/TRAINING PROGRAM

## 2021-04-24 PROCEDURE — 20600001 HC STEP DOWN PRIVATE ROOM

## 2021-04-24 PROCEDURE — 25000003 PHARM REV CODE 250: Performed by: STUDENT IN AN ORGANIZED HEALTH CARE EDUCATION/TRAINING PROGRAM

## 2021-04-24 PROCEDURE — 25000003 PHARM REV CODE 250: Performed by: INTERNAL MEDICINE

## 2021-04-24 RX ADMIN — SODIUM CHLORIDE 35 ML/HR: 0.9 INJECTION, SOLUTION INTRAVENOUS at 11:04

## 2021-04-24 RX ADMIN — CEFEPIME 2 G: 2 INJECTION, POWDER, FOR SOLUTION INTRAVENOUS at 12:04

## 2021-04-24 RX ADMIN — APIXABAN 5 MG: 5 TABLET, FILM COATED ORAL at 08:04

## 2021-04-24 RX ADMIN — MIRTAZAPINE 15 MG: 15 TABLET, ORALLY DISINTEGRATING ORAL at 08:04

## 2021-04-24 RX ADMIN — PANTOPRAZOLE SODIUM 40 MG: 40 TABLET, DELAYED RELEASE ORAL at 08:04

## 2021-04-25 PROBLEM — F33.1 MODERATE EPISODE OF RECURRENT MAJOR DEPRESSIVE DISORDER: Status: ACTIVE | Noted: 2021-04-21

## 2021-04-25 LAB
ANION GAP SERPL CALC-SCNC: 5 MMOL/L (ref 8–16)
BACTERIA BLD CULT: NORMAL
BACTERIA BLD CULT: NORMAL
BASOPHILS # BLD AUTO: 0.03 K/UL (ref 0–0.2)
BASOPHILS NFR BLD: 0.5 % (ref 0–1.9)
BUN SERPL-MCNC: 25 MG/DL (ref 6–20)
CALCIUM SERPL-MCNC: 7.9 MG/DL (ref 8.7–10.5)
CHLORIDE SERPL-SCNC: 119 MMOL/L (ref 95–110)
CO2 SERPL-SCNC: 17 MMOL/L (ref 23–29)
CREAT SERPL-MCNC: 1.2 MG/DL (ref 0.5–1.4)
DIFFERENTIAL METHOD: ABNORMAL
EOSINOPHIL # BLD AUTO: 0.2 K/UL (ref 0–0.5)
EOSINOPHIL NFR BLD: 3.3 % (ref 0–8)
ERYTHROCYTE [DISTWIDTH] IN BLOOD BY AUTOMATED COUNT: 16 % (ref 11.5–14.5)
EST. GFR  (AFRICAN AMERICAN): 57.6 ML/MIN/1.73 M^2
EST. GFR  (NON AFRICAN AMERICAN): 49.9 ML/MIN/1.73 M^2
GLUCOSE SERPL-MCNC: 110 MG/DL (ref 70–110)
HCT VFR BLD AUTO: 30.8 % (ref 37–48.5)
HGB BLD-MCNC: 9.1 G/DL (ref 12–16)
IMM GRANULOCYTES # BLD AUTO: 0.03 K/UL (ref 0–0.04)
IMM GRANULOCYTES NFR BLD AUTO: 0.5 % (ref 0–0.5)
LYMPHOCYTES # BLD AUTO: 1.5 K/UL (ref 1–4.8)
LYMPHOCYTES NFR BLD: 24.1 % (ref 18–48)
MCH RBC QN AUTO: 27.9 PG (ref 27–31)
MCHC RBC AUTO-ENTMCNC: 29.5 G/DL (ref 32–36)
MCV RBC AUTO: 95 FL (ref 82–98)
MONOCYTES # BLD AUTO: 0.6 K/UL (ref 0.3–1)
MONOCYTES NFR BLD: 9.7 % (ref 4–15)
NEUTROPHILS # BLD AUTO: 3.7 K/UL (ref 1.8–7.7)
NEUTROPHILS NFR BLD: 61.9 % (ref 38–73)
NRBC BLD-RTO: 0 /100 WBC
PLATELET # BLD AUTO: 198 K/UL (ref 150–450)
PMV BLD AUTO: 10.4 FL (ref 9.2–12.9)
POCT GLUCOSE: 89 MG/DL (ref 70–110)
POTASSIUM SERPL-SCNC: 3.6 MMOL/L (ref 3.5–5.1)
RBC # BLD AUTO: 3.26 M/UL (ref 4–5.4)
SODIUM SERPL-SCNC: 141 MMOL/L (ref 136–145)
WBC # BLD AUTO: 6.01 K/UL (ref 3.9–12.7)

## 2021-04-25 PROCEDURE — 99232 SBSQ HOSP IP/OBS MODERATE 35: CPT | Mod: ,,, | Performed by: INTERNAL MEDICINE

## 2021-04-25 PROCEDURE — 20600001 HC STEP DOWN PRIVATE ROOM

## 2021-04-25 PROCEDURE — 99232 PR SUBSEQUENT HOSPITAL CARE,LEVL II: ICD-10-PCS | Mod: ,,, | Performed by: INTERNAL MEDICINE

## 2021-04-25 PROCEDURE — 80048 BASIC METABOLIC PNL TOTAL CA: CPT | Performed by: STUDENT IN AN ORGANIZED HEALTH CARE EDUCATION/TRAINING PROGRAM

## 2021-04-25 PROCEDURE — 36415 COLL VENOUS BLD VENIPUNCTURE: CPT | Performed by: STUDENT IN AN ORGANIZED HEALTH CARE EDUCATION/TRAINING PROGRAM

## 2021-04-25 PROCEDURE — 85025 COMPLETE CBC W/AUTO DIFF WBC: CPT | Performed by: HOSPITALIST

## 2021-04-25 PROCEDURE — 63600175 PHARM REV CODE 636 W HCPCS: Performed by: PHYSICIAN ASSISTANT

## 2021-04-25 PROCEDURE — 25000003 PHARM REV CODE 250: Performed by: STUDENT IN AN ORGANIZED HEALTH CARE EDUCATION/TRAINING PROGRAM

## 2021-04-25 RX ADMIN — PANTOPRAZOLE SODIUM 40 MG: 40 TABLET, DELAYED RELEASE ORAL at 09:04

## 2021-04-25 RX ADMIN — MIRTAZAPINE 15 MG: 15 TABLET, ORALLY DISINTEGRATING ORAL at 09:04

## 2021-04-25 RX ADMIN — CEFEPIME 2 G: 2 INJECTION, POWDER, FOR SOLUTION INTRAVENOUS at 12:04

## 2021-04-25 RX ADMIN — APIXABAN 5 MG: 5 TABLET, FILM COATED ORAL at 09:04

## 2021-04-26 PROBLEM — N39.0 UTI (URINARY TRACT INFECTION): Status: RESOLVED | Noted: 2021-04-21 | Resolved: 2021-04-26

## 2021-04-26 PROBLEM — R78.81 BACTEREMIA: Status: RESOLVED | Noted: 2021-04-20 | Resolved: 2021-04-26

## 2021-04-26 PROBLEM — A41.9 SEPTIC SHOCK: Status: RESOLVED | Noted: 2021-04-17 | Resolved: 2021-04-26

## 2021-04-26 PROBLEM — R65.21 SEPTIC SHOCK: Status: RESOLVED | Noted: 2021-04-17 | Resolved: 2021-04-26

## 2021-04-26 PROBLEM — E86.9 VOLUME DEPLETION: Status: RESOLVED | Noted: 2021-04-16 | Resolved: 2021-04-26

## 2021-04-26 PROBLEM — E87.20 METABOLIC ACIDOSIS, NORMAL ANION GAP (NAG): Status: RESOLVED | Noted: 2021-04-17 | Resolved: 2021-04-26

## 2021-04-26 LAB
ANION GAP SERPL CALC-SCNC: 7 MMOL/L (ref 8–16)
BACTERIA SPEC ANAEROBE CULT: NORMAL
BASOPHILS # BLD AUTO: 0.03 K/UL (ref 0–0.2)
BASOPHILS NFR BLD: 0.5 % (ref 0–1.9)
BUN SERPL-MCNC: 27 MG/DL (ref 6–20)
CALCIUM SERPL-MCNC: 8.2 MG/DL (ref 8.7–10.5)
CHLORIDE SERPL-SCNC: 119 MMOL/L (ref 95–110)
CO2 SERPL-SCNC: 16 MMOL/L (ref 23–29)
CREAT SERPL-MCNC: 1.2 MG/DL (ref 0.5–1.4)
DIFFERENTIAL METHOD: ABNORMAL
EOSINOPHIL # BLD AUTO: 0.2 K/UL (ref 0–0.5)
EOSINOPHIL NFR BLD: 2.9 % (ref 0–8)
ERYTHROCYTE [DISTWIDTH] IN BLOOD BY AUTOMATED COUNT: 16.2 % (ref 11.5–14.5)
EST. GFR  (AFRICAN AMERICAN): 57.6 ML/MIN/1.73 M^2
EST. GFR  (NON AFRICAN AMERICAN): 49.9 ML/MIN/1.73 M^2
GLUCOSE SERPL-MCNC: 121 MG/DL (ref 70–110)
HCT VFR BLD AUTO: 31.1 % (ref 37–48.5)
HGB BLD-MCNC: 9.3 G/DL (ref 12–16)
IMM GRANULOCYTES # BLD AUTO: 0.01 K/UL (ref 0–0.04)
IMM GRANULOCYTES NFR BLD AUTO: 0.2 % (ref 0–0.5)
LYMPHOCYTES # BLD AUTO: 1.2 K/UL (ref 1–4.8)
LYMPHOCYTES NFR BLD: 20.8 % (ref 18–48)
MCH RBC QN AUTO: 27.8 PG (ref 27–31)
MCHC RBC AUTO-ENTMCNC: 29.9 G/DL (ref 32–36)
MCV RBC AUTO: 93 FL (ref 82–98)
MONOCYTES # BLD AUTO: 0.6 K/UL (ref 0.3–1)
MONOCYTES NFR BLD: 9.6 % (ref 4–15)
NEUTROPHILS # BLD AUTO: 3.9 K/UL (ref 1.8–7.7)
NEUTROPHILS NFR BLD: 66 % (ref 38–73)
NRBC BLD-RTO: 0 /100 WBC
PLATELET # BLD AUTO: 223 K/UL (ref 150–450)
PMV BLD AUTO: 11.2 FL (ref 9.2–12.9)
POCT GLUCOSE: 141 MG/DL (ref 70–110)
POCT GLUCOSE: 85 MG/DL (ref 70–110)
POTASSIUM SERPL-SCNC: 3.5 MMOL/L (ref 3.5–5.1)
RBC # BLD AUTO: 3.34 M/UL (ref 4–5.4)
SODIUM SERPL-SCNC: 142 MMOL/L (ref 136–145)
WBC # BLD AUTO: 5.95 K/UL (ref 3.9–12.7)

## 2021-04-26 PROCEDURE — 85025 COMPLETE CBC W/AUTO DIFF WBC: CPT | Performed by: HOSPITALIST

## 2021-04-26 PROCEDURE — 97530 THERAPEUTIC ACTIVITIES: CPT

## 2021-04-26 PROCEDURE — 80048 BASIC METABOLIC PNL TOTAL CA: CPT | Performed by: STUDENT IN AN ORGANIZED HEALTH CARE EDUCATION/TRAINING PROGRAM

## 2021-04-26 PROCEDURE — 99233 PR SUBSEQUENT HOSPITAL CARE,LEVL III: ICD-10-PCS | Mod: ,,, | Performed by: INTERNAL MEDICINE

## 2021-04-26 PROCEDURE — 36415 COLL VENOUS BLD VENIPUNCTURE: CPT | Performed by: STUDENT IN AN ORGANIZED HEALTH CARE EDUCATION/TRAINING PROGRAM

## 2021-04-26 PROCEDURE — 25000003 PHARM REV CODE 250: Performed by: STUDENT IN AN ORGANIZED HEALTH CARE EDUCATION/TRAINING PROGRAM

## 2021-04-26 PROCEDURE — 63600175 PHARM REV CODE 636 W HCPCS: Performed by: PHYSICIAN ASSISTANT

## 2021-04-26 PROCEDURE — 97535 SELF CARE MNGMENT TRAINING: CPT

## 2021-04-26 PROCEDURE — 97116 GAIT TRAINING THERAPY: CPT

## 2021-04-26 PROCEDURE — 99233 SBSQ HOSP IP/OBS HIGH 50: CPT | Mod: ,,, | Performed by: INTERNAL MEDICINE

## 2021-04-26 PROCEDURE — 20600001 HC STEP DOWN PRIVATE ROOM

## 2021-04-26 RX ADMIN — APIXABAN 5 MG: 5 TABLET, FILM COATED ORAL at 09:04

## 2021-04-26 RX ADMIN — CEFEPIME 2 G: 2 INJECTION, POWDER, FOR SOLUTION INTRAVENOUS at 12:04

## 2021-04-26 RX ADMIN — PANTOPRAZOLE SODIUM 40 MG: 40 TABLET, DELAYED RELEASE ORAL at 08:04

## 2021-04-26 RX ADMIN — MIRTAZAPINE 15 MG: 15 TABLET, ORALLY DISINTEGRATING ORAL at 09:04

## 2021-04-26 RX ADMIN — APIXABAN 5 MG: 5 TABLET, FILM COATED ORAL at 08:04

## 2021-04-27 PROBLEM — B96.5 PSEUDOMONAS URINARY TRACT INFECTION: Status: ACTIVE | Noted: 2021-04-21

## 2021-04-27 LAB
ANION GAP SERPL CALC-SCNC: 6 MMOL/L (ref 8–16)
ANISOCYTOSIS BLD QL SMEAR: SLIGHT
BASOPHILS # BLD AUTO: 0.03 K/UL (ref 0–0.2)
BASOPHILS NFR BLD: 0.6 % (ref 0–1.9)
BUN SERPL-MCNC: 30 MG/DL (ref 6–20)
CALCIUM SERPL-MCNC: 8.8 MG/DL (ref 8.7–10.5)
CHLORIDE SERPL-SCNC: 120 MMOL/L (ref 95–110)
CO2 SERPL-SCNC: 15 MMOL/L (ref 23–29)
CREAT SERPL-MCNC: 1 MG/DL (ref 0.5–1.4)
DIFFERENTIAL METHOD: ABNORMAL
EOSINOPHIL # BLD AUTO: 0.2 K/UL (ref 0–0.5)
EOSINOPHIL NFR BLD: 3.4 % (ref 0–8)
ERYTHROCYTE [DISTWIDTH] IN BLOOD BY AUTOMATED COUNT: 16.5 % (ref 11.5–14.5)
EST. GFR  (AFRICAN AMERICAN): >60 ML/MIN/1.73 M^2
EST. GFR  (NON AFRICAN AMERICAN): >60 ML/MIN/1.73 M^2
GLUCOSE SERPL-MCNC: 104 MG/DL (ref 70–110)
HCT VFR BLD AUTO: 31.3 % (ref 37–48.5)
HGB BLD-MCNC: 9.3 G/DL (ref 12–16)
IMM GRANULOCYTES # BLD AUTO: 0.01 K/UL (ref 0–0.04)
IMM GRANULOCYTES NFR BLD AUTO: 0.2 % (ref 0–0.5)
LYMPHOCYTES # BLD AUTO: 1.3 K/UL (ref 1–4.8)
LYMPHOCYTES NFR BLD: 27.9 % (ref 18–48)
MCH RBC QN AUTO: 28 PG (ref 27–31)
MCHC RBC AUTO-ENTMCNC: 29.7 G/DL (ref 32–36)
MCV RBC AUTO: 94 FL (ref 82–98)
MONOCYTES # BLD AUTO: 0.7 K/UL (ref 0.3–1)
MONOCYTES NFR BLD: 15.5 % (ref 4–15)
NEUTROPHILS # BLD AUTO: 2.4 K/UL (ref 1.8–7.7)
NEUTROPHILS NFR BLD: 52.4 % (ref 38–73)
NRBC BLD-RTO: 0 /100 WBC
PLATELET # BLD AUTO: 233 K/UL (ref 150–450)
PLATELET BLD QL SMEAR: ABNORMAL
PMV BLD AUTO: 11.4 FL (ref 9.2–12.9)
POCT GLUCOSE: 129 MG/DL (ref 70–110)
POLYCHROMASIA BLD QL SMEAR: ABNORMAL
POTASSIUM SERPL-SCNC: 4.2 MMOL/L (ref 3.5–5.1)
RBC # BLD AUTO: 3.32 M/UL (ref 4–5.4)
SODIUM SERPL-SCNC: 141 MMOL/L (ref 136–145)
WBC # BLD AUTO: 4.66 K/UL (ref 3.9–12.7)

## 2021-04-27 PROCEDURE — 36415 COLL VENOUS BLD VENIPUNCTURE: CPT | Performed by: STUDENT IN AN ORGANIZED HEALTH CARE EDUCATION/TRAINING PROGRAM

## 2021-04-27 PROCEDURE — 99232 SBSQ HOSP IP/OBS MODERATE 35: CPT | Mod: 95,,, | Performed by: INTERNAL MEDICINE

## 2021-04-27 PROCEDURE — 80048 BASIC METABOLIC PNL TOTAL CA: CPT | Performed by: STUDENT IN AN ORGANIZED HEALTH CARE EDUCATION/TRAINING PROGRAM

## 2021-04-27 PROCEDURE — 93010 EKG 12-LEAD: ICD-10-PCS | Mod: ,,, | Performed by: STUDENT IN AN ORGANIZED HEALTH CARE EDUCATION/TRAINING PROGRAM

## 2021-04-27 PROCEDURE — 93010 ELECTROCARDIOGRAM REPORT: CPT | Mod: ,,, | Performed by: STUDENT IN AN ORGANIZED HEALTH CARE EDUCATION/TRAINING PROGRAM

## 2021-04-27 PROCEDURE — 20600001 HC STEP DOWN PRIVATE ROOM

## 2021-04-27 PROCEDURE — 63600175 PHARM REV CODE 636 W HCPCS: Performed by: PHYSICIAN ASSISTANT

## 2021-04-27 PROCEDURE — 97530 THERAPEUTIC ACTIVITIES: CPT

## 2021-04-27 PROCEDURE — 85025 COMPLETE CBC W/AUTO DIFF WBC: CPT | Performed by: HOSPITALIST

## 2021-04-27 PROCEDURE — 99232 PR SUBSEQUENT HOSPITAL CARE,LEVL II: ICD-10-PCS | Mod: 95,,, | Performed by: INTERNAL MEDICINE

## 2021-04-27 PROCEDURE — 25000003 PHARM REV CODE 250: Performed by: STUDENT IN AN ORGANIZED HEALTH CARE EDUCATION/TRAINING PROGRAM

## 2021-04-27 PROCEDURE — 93005 ELECTROCARDIOGRAM TRACING: CPT

## 2021-04-27 PROCEDURE — 25000003 PHARM REV CODE 250: Performed by: INTERNAL MEDICINE

## 2021-04-27 PROCEDURE — 97535 SELF CARE MNGMENT TRAINING: CPT

## 2021-04-27 RX ORDER — SODIUM CHLORIDE 450 MG/100ML
INJECTION, SOLUTION INTRAVENOUS CONTINUOUS
Status: ACTIVE | OUTPATIENT
Start: 2021-04-27 | End: 2021-04-27

## 2021-04-27 RX ORDER — SODIUM BICARBONATE 650 MG/1
650 TABLET ORAL 2 TIMES DAILY
Status: DISCONTINUED | OUTPATIENT
Start: 2021-04-27 | End: 2021-04-29 | Stop reason: HOSPADM

## 2021-04-27 RX ADMIN — SODIUM CHLORIDE: 450 INJECTION, SOLUTION INTRAVENOUS at 04:04

## 2021-04-27 RX ADMIN — SODIUM BICARBONATE 650 MG TABLET 650 MG: at 09:04

## 2021-04-27 RX ADMIN — PANTOPRAZOLE SODIUM 40 MG: 40 TABLET, DELAYED RELEASE ORAL at 08:04

## 2021-04-27 RX ADMIN — APIXABAN 5 MG: 5 TABLET, FILM COATED ORAL at 08:04

## 2021-04-27 RX ADMIN — MIRTAZAPINE 15 MG: 15 TABLET, ORALLY DISINTEGRATING ORAL at 09:04

## 2021-04-27 RX ADMIN — APIXABAN 5 MG: 5 TABLET, FILM COATED ORAL at 09:04

## 2021-04-27 RX ADMIN — CEFEPIME 2 G: 2 INJECTION, POWDER, FOR SOLUTION INTRAVENOUS at 12:04

## 2021-04-28 LAB
ANION GAP SERPL CALC-SCNC: 5 MMOL/L (ref 8–16)
BASOPHILS # BLD AUTO: 0.02 K/UL (ref 0–0.2)
BASOPHILS NFR BLD: 0.5 % (ref 0–1.9)
BUN SERPL-MCNC: 26 MG/DL (ref 6–20)
CALCIUM SERPL-MCNC: 8.8 MG/DL (ref 8.7–10.5)
CHLORIDE SERPL-SCNC: 121 MMOL/L (ref 95–110)
CO2 SERPL-SCNC: 18 MMOL/L (ref 23–29)
CREAT SERPL-MCNC: 1.1 MG/DL (ref 0.5–1.4)
DIFFERENTIAL METHOD: ABNORMAL
EOSINOPHIL # BLD AUTO: 0.2 K/UL (ref 0–0.5)
EOSINOPHIL NFR BLD: 5.1 % (ref 0–8)
ERYTHROCYTE [DISTWIDTH] IN BLOOD BY AUTOMATED COUNT: 17.1 % (ref 11.5–14.5)
EST. GFR  (AFRICAN AMERICAN): >60 ML/MIN/1.73 M^2
EST. GFR  (NON AFRICAN AMERICAN): 55.5 ML/MIN/1.73 M^2
GLUCOSE SERPL-MCNC: 101 MG/DL (ref 70–110)
HCT VFR BLD AUTO: 31.8 % (ref 37–48.5)
HGB BLD-MCNC: 9.3 G/DL (ref 12–16)
IMM GRANULOCYTES # BLD AUTO: 0.02 K/UL (ref 0–0.04)
IMM GRANULOCYTES NFR BLD AUTO: 0.5 % (ref 0–0.5)
LYMPHOCYTES # BLD AUTO: 1.3 K/UL (ref 1–4.8)
LYMPHOCYTES NFR BLD: 32.5 % (ref 18–48)
MCH RBC QN AUTO: 28 PG (ref 27–31)
MCHC RBC AUTO-ENTMCNC: 29.2 G/DL (ref 32–36)
MCV RBC AUTO: 96 FL (ref 82–98)
MONOCYTES # BLD AUTO: 0.6 K/UL (ref 0.3–1)
MONOCYTES NFR BLD: 15.9 % (ref 4–15)
NEUTROPHILS # BLD AUTO: 1.8 K/UL (ref 1.8–7.7)
NEUTROPHILS NFR BLD: 45.5 % (ref 38–73)
NRBC BLD-RTO: 0 /100 WBC
PLATELET # BLD AUTO: 288 K/UL (ref 150–450)
PMV BLD AUTO: 11.1 FL (ref 9.2–12.9)
POTASSIUM SERPL-SCNC: 3.7 MMOL/L (ref 3.5–5.1)
RBC # BLD AUTO: 3.32 M/UL (ref 4–5.4)
SODIUM SERPL-SCNC: 144 MMOL/L (ref 136–145)
WBC # BLD AUTO: 3.91 K/UL (ref 3.9–12.7)

## 2021-04-28 PROCEDURE — 36415 COLL VENOUS BLD VENIPUNCTURE: CPT | Performed by: STUDENT IN AN ORGANIZED HEALTH CARE EDUCATION/TRAINING PROGRAM

## 2021-04-28 PROCEDURE — 97116 GAIT TRAINING THERAPY: CPT

## 2021-04-28 PROCEDURE — 63600175 PHARM REV CODE 636 W HCPCS: Performed by: PHYSICIAN ASSISTANT

## 2021-04-28 PROCEDURE — 20600001 HC STEP DOWN PRIVATE ROOM

## 2021-04-28 PROCEDURE — 97112 NEUROMUSCULAR REEDUCATION: CPT

## 2021-04-28 PROCEDURE — 97530 THERAPEUTIC ACTIVITIES: CPT

## 2021-04-28 PROCEDURE — 99232 PR SUBSEQUENT HOSPITAL CARE,LEVL II: ICD-10-PCS | Mod: 95,,, | Performed by: INTERNAL MEDICINE

## 2021-04-28 PROCEDURE — 99232 SBSQ HOSP IP/OBS MODERATE 35: CPT | Mod: 95,,, | Performed by: INTERNAL MEDICINE

## 2021-04-28 PROCEDURE — 25000003 PHARM REV CODE 250: Performed by: INTERNAL MEDICINE

## 2021-04-28 PROCEDURE — 25000003 PHARM REV CODE 250: Performed by: STUDENT IN AN ORGANIZED HEALTH CARE EDUCATION/TRAINING PROGRAM

## 2021-04-28 PROCEDURE — 85025 COMPLETE CBC W/AUTO DIFF WBC: CPT | Performed by: HOSPITALIST

## 2021-04-28 PROCEDURE — 80048 BASIC METABOLIC PNL TOTAL CA: CPT | Performed by: STUDENT IN AN ORGANIZED HEALTH CARE EDUCATION/TRAINING PROGRAM

## 2021-04-28 RX ADMIN — CEFEPIME 2 G: 2 INJECTION, POWDER, FOR SOLUTION INTRAVENOUS at 12:04

## 2021-04-28 RX ADMIN — SODIUM BICARBONATE 650 MG TABLET 650 MG: at 09:04

## 2021-04-28 RX ADMIN — APIXABAN 5 MG: 5 TABLET, FILM COATED ORAL at 09:04

## 2021-04-28 RX ADMIN — PANTOPRAZOLE SODIUM 40 MG: 40 TABLET, DELAYED RELEASE ORAL at 09:04

## 2021-04-28 RX ADMIN — SODIUM BICARBONATE 650 MG TABLET 650 MG: at 08:04

## 2021-04-28 RX ADMIN — MIRTAZAPINE 15 MG: 15 TABLET, ORALLY DISINTEGRATING ORAL at 08:04

## 2021-04-28 RX ADMIN — APIXABAN 5 MG: 5 TABLET, FILM COATED ORAL at 08:04

## 2021-04-29 VITALS
BODY MASS INDEX: 21.49 KG/M2 | TEMPERATURE: 98 F | HEART RATE: 100 BPM | HEIGHT: 69 IN | DIASTOLIC BLOOD PRESSURE: 61 MMHG | WEIGHT: 145.06 LBS | OXYGEN SATURATION: 100 % | SYSTOLIC BLOOD PRESSURE: 98 MMHG | RESPIRATION RATE: 18 BRPM

## 2021-04-29 PROBLEM — N39.0 SEPSIS SECONDARY TO UTI: Status: RESOLVED | Noted: 2021-04-16 | Resolved: 2021-04-29

## 2021-04-29 PROBLEM — N17.9 ACUTE KIDNEY INJURY: Status: RESOLVED | Noted: 2021-04-16 | Resolved: 2021-04-29

## 2021-04-29 PROBLEM — A41.9 SEPSIS: Status: ACTIVE | Noted: 2021-04-29

## 2021-04-29 PROBLEM — A41.9 SEPSIS SECONDARY TO UTI: Status: RESOLVED | Noted: 2021-04-16 | Resolved: 2021-04-29

## 2021-04-29 PROBLEM — R62.7 FAILURE TO THRIVE IN ADULT: Status: RESOLVED | Noted: 2021-04-16 | Resolved: 2021-04-29

## 2021-04-29 LAB
ANION GAP SERPL CALC-SCNC: 6 MMOL/L (ref 8–16)
BASOPHILS # BLD AUTO: 0.03 K/UL (ref 0–0.2)
BASOPHILS NFR BLD: 0.8 % (ref 0–1.9)
BUN SERPL-MCNC: 28 MG/DL (ref 6–20)
CALCIUM SERPL-MCNC: 8.9 MG/DL (ref 8.7–10.5)
CHLORIDE SERPL-SCNC: 120 MMOL/L (ref 95–110)
CO2 SERPL-SCNC: 19 MMOL/L (ref 23–29)
CREAT SERPL-MCNC: 1.2 MG/DL (ref 0.5–1.4)
DIFFERENTIAL METHOD: ABNORMAL
EOSINOPHIL # BLD AUTO: 0.2 K/UL (ref 0–0.5)
EOSINOPHIL NFR BLD: 4.8 % (ref 0–8)
ERYTHROCYTE [DISTWIDTH] IN BLOOD BY AUTOMATED COUNT: 17.9 % (ref 11.5–14.5)
EST. GFR  (AFRICAN AMERICAN): 57.6 ML/MIN/1.73 M^2
EST. GFR  (NON AFRICAN AMERICAN): 49.9 ML/MIN/1.73 M^2
GLUCOSE SERPL-MCNC: 96 MG/DL (ref 70–110)
HCT VFR BLD AUTO: 32 % (ref 37–48.5)
HGB BLD-MCNC: 9.3 G/DL (ref 12–16)
IMM GRANULOCYTES # BLD AUTO: 0.01 K/UL (ref 0–0.04)
IMM GRANULOCYTES NFR BLD AUTO: 0.3 % (ref 0–0.5)
LYMPHOCYTES # BLD AUTO: 1.2 K/UL (ref 1–4.8)
LYMPHOCYTES NFR BLD: 31.1 % (ref 18–48)
MCH RBC QN AUTO: 27.8 PG (ref 27–31)
MCHC RBC AUTO-ENTMCNC: 29.1 G/DL (ref 32–36)
MCV RBC AUTO: 96 FL (ref 82–98)
MONOCYTES # BLD AUTO: 0.6 K/UL (ref 0.3–1)
MONOCYTES NFR BLD: 17 % (ref 4–15)
NEUTROPHILS # BLD AUTO: 1.7 K/UL (ref 1.8–7.7)
NEUTROPHILS NFR BLD: 46 % (ref 38–73)
NRBC BLD-RTO: 0 /100 WBC
PLATELET # BLD AUTO: 326 K/UL (ref 150–450)
PLATELET BLD QL SMEAR: ABNORMAL
PMV BLD AUTO: 10.7 FL (ref 9.2–12.9)
POTASSIUM SERPL-SCNC: 3.6 MMOL/L (ref 3.5–5.1)
RBC # BLD AUTO: 3.34 M/UL (ref 4–5.4)
SODIUM SERPL-SCNC: 145 MMOL/L (ref 136–145)
WBC # BLD AUTO: 3.76 K/UL (ref 3.9–12.7)

## 2021-04-29 PROCEDURE — 25000003 PHARM REV CODE 250: Performed by: STUDENT IN AN ORGANIZED HEALTH CARE EDUCATION/TRAINING PROGRAM

## 2021-04-29 PROCEDURE — 97535 SELF CARE MNGMENT TRAINING: CPT

## 2021-04-29 PROCEDURE — 80048 BASIC METABOLIC PNL TOTAL CA: CPT | Performed by: STUDENT IN AN ORGANIZED HEALTH CARE EDUCATION/TRAINING PROGRAM

## 2021-04-29 PROCEDURE — 63600175 PHARM REV CODE 636 W HCPCS: Performed by: PHYSICIAN ASSISTANT

## 2021-04-29 PROCEDURE — 97530 THERAPEUTIC ACTIVITIES: CPT

## 2021-04-29 PROCEDURE — 99239 HOSP IP/OBS DSCHRG MGMT >30: CPT | Mod: 95,,, | Performed by: INTERNAL MEDICINE

## 2021-04-29 PROCEDURE — 85025 COMPLETE CBC W/AUTO DIFF WBC: CPT | Performed by: HOSPITALIST

## 2021-04-29 PROCEDURE — 25000003 PHARM REV CODE 250: Performed by: INTERNAL MEDICINE

## 2021-04-29 PROCEDURE — 36415 COLL VENOUS BLD VENIPUNCTURE: CPT | Performed by: STUDENT IN AN ORGANIZED HEALTH CARE EDUCATION/TRAINING PROGRAM

## 2021-04-29 PROCEDURE — 99239 PR HOSPITAL DISCHARGE DAY,>30 MIN: ICD-10-PCS | Mod: 95,,, | Performed by: INTERNAL MEDICINE

## 2021-04-29 RX ORDER — SODIUM BICARBONATE 650 MG/1
650 TABLET ORAL 2 TIMES DAILY
Status: ON HOLD
Start: 2021-04-29 | End: 2021-05-07 | Stop reason: HOSPADM

## 2021-04-29 RX ORDER — METOPROLOL TARTRATE 25 MG/1
12.5 TABLET, FILM COATED ORAL DAILY
Status: ON HOLD
Start: 2021-04-29 | End: 2021-05-07 | Stop reason: HOSPADM

## 2021-04-29 RX ADMIN — SODIUM BICARBONATE 650 MG TABLET 650 MG: at 08:04

## 2021-04-29 RX ADMIN — PANTOPRAZOLE SODIUM 40 MG: 40 TABLET, DELAYED RELEASE ORAL at 08:04

## 2021-04-29 RX ADMIN — CEFEPIME 2 G: 2 INJECTION, POWDER, FOR SOLUTION INTRAVENOUS at 11:04

## 2021-04-29 RX ADMIN — APIXABAN 5 MG: 5 TABLET, FILM COATED ORAL at 08:04

## 2021-04-29 RX ADMIN — CEFEPIME 2 G: 2 INJECTION, POWDER, FOR SOLUTION INTRAVENOUS at 01:04

## 2021-04-30 PROBLEM — R53.81 PHYSICAL DECONDITIONING: Status: ACTIVE | Noted: 2021-04-30

## 2021-04-30 PROBLEM — Z71.89 ADVANCE CARE PLANNING: Status: ACTIVE | Noted: 2021-04-30

## 2021-04-30 PROBLEM — R78.81 GRAM-POSITIVE COCCI BACTEREMIA: Status: ACTIVE | Noted: 2021-04-30

## 2021-04-30 PROBLEM — Z75.8 DISCHARGE PLANNING ISSUES: Status: ACTIVE | Noted: 2021-04-30

## 2021-04-30 PROBLEM — Z02.9 DISCHARGE PLANNING ISSUES: Status: ACTIVE | Noted: 2021-04-30

## 2021-04-30 PROBLEM — A41.50 SEPSIS DUE TO GRAM-NEGATIVE UTI: Status: ACTIVE | Noted: 2021-04-29

## 2021-04-30 PROBLEM — N39.0 SEPSIS DUE TO GRAM-NEGATIVE UTI: Status: ACTIVE | Noted: 2021-04-29

## 2021-05-13 ENCOUNTER — TELEPHONE (OUTPATIENT)
Dept: WOUND CARE | Facility: CLINIC | Age: 56
End: 2021-05-13

## 2021-05-13 ENCOUNTER — TELEPHONE (OUTPATIENT)
Dept: UROLOGY | Facility: CLINIC | Age: 56
End: 2021-05-13

## 2021-05-14 ENCOUNTER — LAB VISIT (OUTPATIENT)
Dept: LAB | Facility: HOSPITAL | Age: 56
End: 2021-05-14
Attending: UROLOGY
Payer: MEDICAID

## 2021-05-14 ENCOUNTER — OFFICE VISIT (OUTPATIENT)
Dept: UROLOGY | Facility: CLINIC | Age: 56
End: 2021-05-14
Payer: MEDICAID

## 2021-05-14 VITALS
HEART RATE: 93 BPM | WEIGHT: 147.06 LBS | DIASTOLIC BLOOD PRESSURE: 64 MMHG | SYSTOLIC BLOOD PRESSURE: 111 MMHG | HEIGHT: 69 IN | BODY MASS INDEX: 21.78 KG/M2

## 2021-05-14 DIAGNOSIS — N13.1 HYDRONEPHROSIS WITH URETERAL STRICTURE, NOT ELSEWHERE CLASSIFIED: Primary | ICD-10-CM

## 2021-05-14 DIAGNOSIS — N13.1 HYDRONEPHROSIS WITH URETERAL STRICTURE, NOT ELSEWHERE CLASSIFIED: ICD-10-CM

## 2021-05-14 LAB
ANION GAP SERPL CALC-SCNC: 8 MMOL/L (ref 8–16)
BUN SERPL-MCNC: 20 MG/DL (ref 6–20)
CALCIUM SERPL-MCNC: 10.1 MG/DL (ref 8.7–10.5)
CHLORIDE SERPL-SCNC: 113 MMOL/L (ref 95–110)
CO2 SERPL-SCNC: 19 MMOL/L (ref 23–29)
CREAT SERPL-MCNC: 1.3 MG/DL (ref 0.5–1.4)
EST. GFR  (AFRICAN AMERICAN): 52.3 ML/MIN/1.73 M^2
EST. GFR  (NON AFRICAN AMERICAN): 45.3 ML/MIN/1.73 M^2
GLUCOSE SERPL-MCNC: 89 MG/DL (ref 70–110)
POTASSIUM SERPL-SCNC: 4 MMOL/L (ref 3.5–5.1)
SODIUM SERPL-SCNC: 140 MMOL/L (ref 136–145)

## 2021-05-14 PROCEDURE — 99999 PR PBB SHADOW E&M-EST. PATIENT-LVL II: CPT | Mod: PBBFAC,,, | Performed by: UROLOGY

## 2021-05-14 PROCEDURE — 80048 BASIC METABOLIC PNL TOTAL CA: CPT | Performed by: UROLOGY

## 2021-05-14 PROCEDURE — 99999 PR PBB SHADOW E&M-EST. PATIENT-LVL II: ICD-10-PCS | Mod: PBBFAC,,, | Performed by: UROLOGY

## 2021-05-14 PROCEDURE — 36415 COLL VENOUS BLD VENIPUNCTURE: CPT | Performed by: UROLOGY

## 2021-05-14 PROCEDURE — 99212 OFFICE O/P EST SF 10 MIN: CPT | Mod: PBBFAC | Performed by: UROLOGY

## 2021-05-14 PROCEDURE — 99024 POSTOP FOLLOW-UP VISIT: CPT | Mod: ,,, | Performed by: UROLOGY

## 2021-05-14 PROCEDURE — 99024 PR POST-OP FOLLOW-UP VISIT: ICD-10-PCS | Mod: ,,, | Performed by: UROLOGY

## 2021-05-18 LAB
FUNGUS SPEC CULT: NORMAL
FUNGUS SPEC CULT: NORMAL

## 2021-05-21 ENCOUNTER — OFFICE VISIT (OUTPATIENT)
Dept: WOUND CARE | Facility: CLINIC | Age: 56
End: 2021-05-21
Payer: MEDICAID

## 2021-05-21 DIAGNOSIS — Z43.6 ATTENTION TO NEPHROSTOMY: ICD-10-CM

## 2021-05-21 DIAGNOSIS — Z71.89 ENCOUNTER FOR OSTOMY CARE EDUCATION: ICD-10-CM

## 2021-05-21 DIAGNOSIS — Z43.6 ATTENTION TO UROSTOMY: ICD-10-CM

## 2021-05-21 DIAGNOSIS — Z43.2 ATTENTION TO ILEOSTOMY: Primary | ICD-10-CM

## 2021-05-21 PROCEDURE — 99215 PR OFFICE/OUTPT VISIT, EST, LEVL V, 40-54 MIN: ICD-10-PCS | Mod: S$PBB,,, | Performed by: CLINICAL NURSE SPECIALIST

## 2021-05-21 PROCEDURE — 99212 OFFICE O/P EST SF 10 MIN: CPT | Mod: PBBFAC | Performed by: CLINICAL NURSE SPECIALIST

## 2021-05-21 PROCEDURE — 99215 OFFICE O/P EST HI 40 MIN: CPT | Mod: S$PBB,,, | Performed by: CLINICAL NURSE SPECIALIST

## 2021-05-21 PROCEDURE — 99999 PR PBB SHADOW E&M-EST. PATIENT-LVL II: CPT | Mod: PBBFAC,,, | Performed by: CLINICAL NURSE SPECIALIST

## 2021-05-21 PROCEDURE — 99999 PR PBB SHADOW E&M-EST. PATIENT-LVL II: ICD-10-PCS | Mod: PBBFAC,,, | Performed by: CLINICAL NURSE SPECIALIST

## 2021-06-17 ENCOUNTER — HOSPITAL ENCOUNTER (INPATIENT)
Facility: HOSPITAL | Age: 56
LOS: 5 days | Discharge: HOME | DRG: 699 | End: 2021-06-22
Attending: EMERGENCY MEDICINE | Admitting: INTERNAL MEDICINE
Payer: MEDICAID

## 2021-06-17 DIAGNOSIS — Z93.6 NEPHROSTOMY STATUS: ICD-10-CM

## 2021-06-17 DIAGNOSIS — R10.9 ABDOMINAL PAIN: ICD-10-CM

## 2021-06-17 DIAGNOSIS — R07.9 CHEST PAIN: ICD-10-CM

## 2021-06-17 DIAGNOSIS — Z93.2 ILEOSTOMY PRESENT: ICD-10-CM

## 2021-06-17 DIAGNOSIS — N12 PYELONEPHRITIS: ICD-10-CM

## 2021-06-17 DIAGNOSIS — N39.0 URINARY TRACT INFECTION WITHOUT HEMATURIA, SITE UNSPECIFIED: Primary | ICD-10-CM

## 2021-06-17 DIAGNOSIS — E87.8 LOW BICARBONATE: ICD-10-CM

## 2021-06-17 LAB
ALBUMIN SERPL BCP-MCNC: 3.3 G/DL (ref 3.5–5.2)
ALP SERPL-CCNC: 144 U/L (ref 55–135)
ALT SERPL W/O P-5'-P-CCNC: 14 U/L (ref 10–44)
ANION GAP SERPL CALC-SCNC: 11 MMOL/L (ref 8–16)
AST SERPL-CCNC: 17 U/L (ref 10–40)
BACTERIA #/AREA URNS AUTO: ABNORMAL /HPF
BASOPHILS # BLD AUTO: 0.06 K/UL (ref 0–0.2)
BASOPHILS NFR BLD: 0.5 % (ref 0–1.9)
BILIRUB SERPL-MCNC: 0.2 MG/DL (ref 0.1–1)
BILIRUB UR QL STRIP: NEGATIVE
BUN SERPL-MCNC: 33 MG/DL (ref 6–20)
BUN SERPL-MCNC: 34 MG/DL (ref 6–30)
CALCIUM SERPL-MCNC: 9.6 MG/DL (ref 8.7–10.5)
CHLORIDE SERPL-SCNC: 115 MMOL/L (ref 95–110)
CHLORIDE SERPL-SCNC: 115 MMOL/L (ref 95–110)
CLARITY UR REFRACT.AUTO: ABNORMAL
CO2 SERPL-SCNC: 11 MMOL/L (ref 23–29)
COLOR UR AUTO: ABNORMAL
COLOR UR AUTO: YELLOW
CREAT SERPL-MCNC: 1.4 MG/DL (ref 0.5–1.4)
CREAT SERPL-MCNC: 1.5 MG/DL (ref 0.5–1.4)
DIFFERENTIAL METHOD: ABNORMAL
EOSINOPHIL # BLD AUTO: 0.1 K/UL (ref 0–0.5)
EOSINOPHIL NFR BLD: 0.8 % (ref 0–8)
ERYTHROCYTE [DISTWIDTH] IN BLOOD BY AUTOMATED COUNT: 15.9 % (ref 11.5–14.5)
EST. GFR  (AFRICAN AMERICAN): 44 ML/MIN/1.73 M^2
EST. GFR  (NON AFRICAN AMERICAN): 38.1 ML/MIN/1.73 M^2
GLUCOSE SERPL-MCNC: 80 MG/DL (ref 70–110)
GLUCOSE SERPL-MCNC: 91 MG/DL (ref 70–110)
GLUCOSE UR QL STRIP: NEGATIVE
HCT VFR BLD AUTO: 40.6 % (ref 37–48.5)
HCT VFR BLD CALC: 32 %PCV (ref 36–54)
HGB BLD-MCNC: 11.9 G/DL (ref 12–16)
HGB UR QL STRIP: ABNORMAL
HGB UR QL STRIP: ABNORMAL
HGB UR QL STRIP: NEGATIVE
HYALINE CASTS UR QL AUTO: 0 /LPF
IMM GRANULOCYTES # BLD AUTO: 0.05 K/UL (ref 0–0.04)
IMM GRANULOCYTES NFR BLD AUTO: 0.4 % (ref 0–0.5)
KETONES UR QL STRIP: NEGATIVE
LACTATE SERPL-SCNC: 2.2 MMOL/L (ref 0.5–2.2)
LEUKOCYTE ESTERASE UR QL STRIP: ABNORMAL
LIPASE SERPL-CCNC: 44 U/L (ref 4–60)
LYMPHOCYTES # BLD AUTO: 1 K/UL (ref 1–4.8)
LYMPHOCYTES NFR BLD: 8.4 % (ref 18–48)
MAGNESIUM SERPL-MCNC: 2 MG/DL (ref 1.6–2.6)
MCH RBC QN AUTO: 27.9 PG (ref 27–31)
MCHC RBC AUTO-ENTMCNC: 29.3 G/DL (ref 32–36)
MCV RBC AUTO: 95 FL (ref 82–98)
MICROSCOPIC COMMENT: ABNORMAL
MONOCYTES # BLD AUTO: 1.2 K/UL (ref 0.3–1)
MONOCYTES NFR BLD: 10.4 % (ref 4–15)
NEUTROPHILS # BLD AUTO: 9.1 K/UL (ref 1.8–7.7)
NEUTROPHILS NFR BLD: 79.5 % (ref 38–73)
NITRITE UR QL STRIP: NEGATIVE
NRBC BLD-RTO: 0 /100 WBC
PH UR STRIP: 8 [PH] (ref 5–8)
PH UR STRIP: 8 [PH] (ref 5–8)
PH UR STRIP: >8 [PH] (ref 5–8)
PLATELET # BLD AUTO: 278 K/UL (ref 150–450)
PMV BLD AUTO: 10.8 FL (ref 9.2–12.9)
POC IONIZED CALCIUM: 1.26 MMOL/L (ref 1.06–1.42)
POC TCO2 (MEASURED): 16 MMOL/L (ref 23–29)
POTASSIUM BLD-SCNC: 4.3 MMOL/L (ref 3.5–5.1)
POTASSIUM SERPL-SCNC: 4.2 MMOL/L (ref 3.5–5.1)
PROT SERPL-MCNC: 7.5 G/DL (ref 6–8.4)
PROT UR QL STRIP: ABNORMAL
RBC # BLD AUTO: 4.26 M/UL (ref 4–5.4)
RBC #/AREA URNS AUTO: 2 /HPF (ref 0–4)
RBC #/AREA URNS AUTO: 6 /HPF (ref 0–4)
RBC #/AREA URNS AUTO: 9 /HPF (ref 0–4)
SAMPLE: ABNORMAL
SODIUM BLD-SCNC: 143 MMOL/L (ref 136–145)
SODIUM SERPL-SCNC: 137 MMOL/L (ref 136–145)
SP GR UR STRIP: 1.01 (ref 1–1.03)
SQUAMOUS #/AREA URNS AUTO: 0 /HPF
SQUAMOUS #/AREA URNS AUTO: 1 /HPF
SQUAMOUS #/AREA URNS AUTO: 2 /HPF
TRI-PHOS CRY UR QL COMP ASSIST: ABNORMAL
URN SPEC COLLECT METH UR: ABNORMAL
WBC # BLD AUTO: 11.46 K/UL (ref 3.9–12.7)
WBC #/AREA URNS AUTO: 20 /HPF (ref 0–5)
WBC #/AREA URNS AUTO: 61 /HPF (ref 0–5)
WBC #/AREA URNS AUTO: >100 /HPF (ref 0–5)
WBC CLUMPS UR QL AUTO: ABNORMAL

## 2021-06-17 PROCEDURE — 80053 COMPREHEN METABOLIC PANEL: CPT | Performed by: EMERGENCY MEDICINE

## 2021-06-17 PROCEDURE — 99223 PR INITIAL HOSPITAL CARE,LEVL III: ICD-10-PCS | Mod: ,,, | Performed by: INTERNAL MEDICINE

## 2021-06-17 PROCEDURE — 81001 URINALYSIS AUTO W/SCOPE: CPT | Mod: 91 | Performed by: PHYSICIAN ASSISTANT

## 2021-06-17 PROCEDURE — 83690 ASSAY OF LIPASE: CPT | Performed by: EMERGENCY MEDICINE

## 2021-06-17 PROCEDURE — 87040 BLOOD CULTURE FOR BACTERIA: CPT | Performed by: PHYSICIAN ASSISTANT

## 2021-06-17 PROCEDURE — 25000003 PHARM REV CODE 250: Performed by: PHYSICIAN ASSISTANT

## 2021-06-17 PROCEDURE — 63600175 PHARM REV CODE 636 W HCPCS: Performed by: PHYSICIAN ASSISTANT

## 2021-06-17 PROCEDURE — 96375 TX/PRO/DX INJ NEW DRUG ADDON: CPT

## 2021-06-17 PROCEDURE — 99285 PR EMERGENCY DEPT VISIT,LEVEL V: ICD-10-PCS | Mod: CS,,, | Performed by: PHYSICIAN ASSISTANT

## 2021-06-17 PROCEDURE — 83605 ASSAY OF LACTIC ACID: CPT | Performed by: EMERGENCY MEDICINE

## 2021-06-17 PROCEDURE — 93010 ELECTROCARDIOGRAM REPORT: CPT | Mod: ,,, | Performed by: INTERNAL MEDICINE

## 2021-06-17 PROCEDURE — 87186 SC STD MICRODIL/AGAR DIL: CPT | Performed by: PHYSICIAN ASSISTANT

## 2021-06-17 PROCEDURE — 87086 URINE CULTURE/COLONY COUNT: CPT | Mod: 59 | Performed by: PHYSICIAN ASSISTANT

## 2021-06-17 PROCEDURE — 87088 URINE BACTERIA CULTURE: CPT | Performed by: PHYSICIAN ASSISTANT

## 2021-06-17 PROCEDURE — 96365 THER/PROPH/DIAG IV INF INIT: CPT

## 2021-06-17 PROCEDURE — 85025 COMPLETE CBC W/AUTO DIFF WBC: CPT | Performed by: PHYSICIAN ASSISTANT

## 2021-06-17 PROCEDURE — 25500020 PHARM REV CODE 255: Performed by: EMERGENCY MEDICINE

## 2021-06-17 PROCEDURE — 99285 EMERGENCY DEPT VISIT HI MDM: CPT | Mod: CS,,, | Performed by: PHYSICIAN ASSISTANT

## 2021-06-17 PROCEDURE — 83735 ASSAY OF MAGNESIUM: CPT | Performed by: EMERGENCY MEDICINE

## 2021-06-17 PROCEDURE — 96376 TX/PRO/DX INJ SAME DRUG ADON: CPT

## 2021-06-17 PROCEDURE — 93010 EKG 12-LEAD: ICD-10-PCS | Mod: ,,, | Performed by: INTERNAL MEDICINE

## 2021-06-17 PROCEDURE — 99285 EMERGENCY DEPT VISIT HI MDM: CPT | Mod: 25

## 2021-06-17 PROCEDURE — 99223 1ST HOSP IP/OBS HIGH 75: CPT | Mod: ,,, | Performed by: INTERNAL MEDICINE

## 2021-06-17 PROCEDURE — 93005 ELECTROCARDIOGRAM TRACING: CPT

## 2021-06-17 PROCEDURE — 12000002 HC ACUTE/MED SURGE SEMI-PRIVATE ROOM

## 2021-06-17 PROCEDURE — 80047 BASIC METABLC PNL IONIZED CA: CPT | Mod: 59

## 2021-06-17 PROCEDURE — 87077 CULTURE AEROBIC IDENTIFY: CPT | Performed by: PHYSICIAN ASSISTANT

## 2021-06-17 RX ORDER — PANTOPRAZOLE SODIUM 40 MG/1
40 TABLET, DELAYED RELEASE ORAL DAILY
Status: DISCONTINUED | OUTPATIENT
Start: 2021-06-18 | End: 2021-06-22 | Stop reason: HOSPADM

## 2021-06-17 RX ORDER — MORPHINE SULFATE 15 MG/1
15 TABLET ORAL
Status: COMPLETED | OUTPATIENT
Start: 2021-06-17 | End: 2021-06-17

## 2021-06-17 RX ORDER — ACETAMINOPHEN 325 MG/1
650 TABLET ORAL EVERY 8 HOURS PRN
Status: DISCONTINUED | OUTPATIENT
Start: 2021-06-18 | End: 2021-06-22 | Stop reason: HOSPADM

## 2021-06-17 RX ORDER — IBUPROFEN 200 MG
16 TABLET ORAL
Status: DISCONTINUED | OUTPATIENT
Start: 2021-06-18 | End: 2021-06-22 | Stop reason: HOSPADM

## 2021-06-17 RX ORDER — IPRATROPIUM BROMIDE AND ALBUTEROL SULFATE 2.5; .5 MG/3ML; MG/3ML
3 SOLUTION RESPIRATORY (INHALATION) EVERY 6 HOURS PRN
Status: DISCONTINUED | OUTPATIENT
Start: 2021-06-18 | End: 2021-06-22 | Stop reason: HOSPADM

## 2021-06-17 RX ORDER — PROMETHAZINE HYDROCHLORIDE 25 MG/1
25 TABLET ORAL EVERY 6 HOURS PRN
Status: DISCONTINUED | OUTPATIENT
Start: 2021-06-18 | End: 2021-06-22 | Stop reason: HOSPADM

## 2021-06-17 RX ORDER — ONDANSETRON 2 MG/ML
4 INJECTION INTRAMUSCULAR; INTRAVENOUS
Status: COMPLETED | OUTPATIENT
Start: 2021-06-17 | End: 2021-06-17

## 2021-06-17 RX ORDER — MIRTAZAPINE 15 MG/1
15 TABLET, ORALLY DISINTEGRATING ORAL NIGHTLY
Status: DISCONTINUED | OUTPATIENT
Start: 2021-06-17 | End: 2021-06-22 | Stop reason: HOSPADM

## 2021-06-17 RX ORDER — MORPHINE SULFATE 4 MG/ML
4 INJECTION, SOLUTION INTRAMUSCULAR; INTRAVENOUS
Status: COMPLETED | OUTPATIENT
Start: 2021-06-17 | End: 2021-06-17

## 2021-06-17 RX ORDER — POLYETHYLENE GLYCOL 3350 17 G/17G
17 POWDER, FOR SOLUTION ORAL 2 TIMES DAILY PRN
Status: DISCONTINUED | OUTPATIENT
Start: 2021-06-18 | End: 2021-06-22 | Stop reason: HOSPADM

## 2021-06-17 RX ORDER — ONDANSETRON 8 MG/1
8 TABLET, ORALLY DISINTEGRATING ORAL EVERY 8 HOURS PRN
Status: DISCONTINUED | OUTPATIENT
Start: 2021-06-18 | End: 2021-06-22 | Stop reason: HOSPADM

## 2021-06-17 RX ORDER — IBUPROFEN 200 MG
24 TABLET ORAL
Status: DISCONTINUED | OUTPATIENT
Start: 2021-06-18 | End: 2021-06-22 | Stop reason: HOSPADM

## 2021-06-17 RX ORDER — SODIUM BICARBONATE 650 MG/1
1300 TABLET ORAL 2 TIMES DAILY
Status: DISCONTINUED | OUTPATIENT
Start: 2021-06-18 | End: 2021-06-22 | Stop reason: HOSPADM

## 2021-06-17 RX ORDER — GABAPENTIN 100 MG/1
100 CAPSULE ORAL NIGHTLY
Status: DISCONTINUED | OUTPATIENT
Start: 2021-06-18 | End: 2021-06-22 | Stop reason: HOSPADM

## 2021-06-17 RX ORDER — VANCOMYCIN HCL IN 5 % DEXTROSE 1G/250ML
1000 PLASTIC BAG, INJECTION (ML) INTRAVENOUS
Status: COMPLETED | OUTPATIENT
Start: 2021-06-17 | End: 2021-06-18

## 2021-06-17 RX ORDER — TALC
6 POWDER (GRAM) TOPICAL NIGHTLY PRN
Status: DISCONTINUED | OUTPATIENT
Start: 2021-06-18 | End: 2021-06-22 | Stop reason: HOSPADM

## 2021-06-17 RX ORDER — SODIUM CHLORIDE 0.9 % (FLUSH) 0.9 %
10 SYRINGE (ML) INJECTION
Status: DISCONTINUED | OUTPATIENT
Start: 2021-06-18 | End: 2021-06-22 | Stop reason: HOSPADM

## 2021-06-17 RX ORDER — GLUCAGON 1 MG
1 KIT INJECTION
Status: DISCONTINUED | OUTPATIENT
Start: 2021-06-18 | End: 2021-06-22 | Stop reason: HOSPADM

## 2021-06-17 RX ADMIN — VANCOMYCIN HYDROCHLORIDE 1000 MG: 1 INJECTION, POWDER, LYOPHILIZED, FOR SOLUTION INTRAVENOUS at 10:06

## 2021-06-17 RX ADMIN — SODIUM CHLORIDE, SODIUM LACTATE, POTASSIUM CHLORIDE, AND CALCIUM CHLORIDE 2000 ML: .6; .31; .03; .02 INJECTION, SOLUTION INTRAVENOUS at 08:06

## 2021-06-17 RX ADMIN — IOHEXOL 75 ML: 350 INJECTION, SOLUTION INTRAVENOUS at 09:06

## 2021-06-17 RX ADMIN — MORPHINE SULFATE 4 MG: 4 INJECTION INTRAVENOUS at 10:06

## 2021-06-17 RX ADMIN — MORPHINE SULFATE 15 MG: 15 TABLET ORAL at 08:06

## 2021-06-17 RX ADMIN — PIPERACILLIN SODIUM AND TAZOBACTAM SODIUM 4.5 G: 4; .5 INJECTION, POWDER, FOR SOLUTION INTRAVENOUS at 10:06

## 2021-06-17 RX ADMIN — ONDANSETRON 4 MG: 2 INJECTION INTRAMUSCULAR; INTRAVENOUS at 08:06

## 2021-06-18 PROBLEM — N39.0 PSEUDOMONAS URINARY TRACT INFECTION: Status: RESOLVED | Noted: 2021-04-21 | Resolved: 2021-06-18

## 2021-06-18 PROBLEM — A41.50 SEPSIS DUE TO GRAM-NEGATIVE UTI: Status: RESOLVED | Noted: 2021-04-29 | Resolved: 2021-06-18

## 2021-06-18 PROBLEM — N30.40 RADIATION CYSTITIS: Chronic | Status: ACTIVE | Noted: 2020-09-04

## 2021-06-18 PROBLEM — B96.5 PSEUDOMONAS URINARY TRACT INFECTION: Status: RESOLVED | Noted: 2021-04-21 | Resolved: 2021-06-18

## 2021-06-18 PROBLEM — E87.0 HYPERNATREMIA: Status: RESOLVED | Noted: 2021-02-07 | Resolved: 2021-06-18

## 2021-06-18 PROBLEM — T83.512A URINARY TRACT INFECTION ASSOCIATED WITH NEPHROSTOMY CATHETER: Status: ACTIVE | Noted: 2021-06-17

## 2021-06-18 PROBLEM — E87.8 ELECTROLYTE DISORDER: Status: RESOLVED | Noted: 2018-12-02 | Resolved: 2021-06-18

## 2021-06-18 PROBLEM — T83.022A NEPHROSTOMY TUBE DISPLACED: Status: RESOLVED | Noted: 2020-08-12 | Resolved: 2021-06-18

## 2021-06-18 PROBLEM — R78.81 GRAM-POSITIVE COCCI BACTEREMIA: Status: RESOLVED | Noted: 2021-04-30 | Resolved: 2021-06-18

## 2021-06-18 PROBLEM — Z71.89 ADVANCE CARE PLANNING: Status: RESOLVED | Noted: 2021-04-30 | Resolved: 2021-06-18

## 2021-06-18 PROBLEM — Z75.8 DISCHARGE PLANNING ISSUES: Status: RESOLVED | Noted: 2021-04-30 | Resolved: 2021-06-18

## 2021-06-18 PROBLEM — I63.9 STROKE: Status: RESOLVED | Noted: 2018-12-02 | Resolved: 2021-06-18

## 2021-06-18 PROBLEM — Z02.9 DISCHARGE PLANNING ISSUES: Status: RESOLVED | Noted: 2021-04-30 | Resolved: 2021-06-18

## 2021-06-18 PROBLEM — N13.30 HYDRONEPHROSIS: Chronic | Status: ACTIVE | Noted: 2020-06-11

## 2021-06-18 PROBLEM — E83.52 HYPERCALCEMIA: Status: RESOLVED | Noted: 2021-01-03 | Resolved: 2021-06-18

## 2021-06-18 PROBLEM — F41.1 GENERALIZED ANXIETY DISORDER: Chronic | Status: ACTIVE | Noted: 2018-10-17

## 2021-06-18 PROBLEM — N39.0 SEPSIS DUE TO GRAM-NEGATIVE UTI: Status: RESOLVED | Noted: 2021-04-29 | Resolved: 2021-06-18

## 2021-06-18 PROBLEM — F43.10 PTSD (POST-TRAUMATIC STRESS DISORDER): Chronic | Status: ACTIVE | Noted: 2018-10-17

## 2021-06-18 LAB
ANION GAP SERPL CALC-SCNC: 11 MMOL/L (ref 8–16)
BACTERIA UR CULT: NORMAL
BASOPHILS # BLD AUTO: 0.03 K/UL (ref 0–0.2)
BASOPHILS # BLD AUTO: 0.05 K/UL (ref 0–0.2)
BASOPHILS NFR BLD: 0.3 % (ref 0–1.9)
BASOPHILS NFR BLD: 0.4 % (ref 0–1.9)
BUN SERPL-MCNC: 29 MG/DL (ref 6–20)
CALCIUM SERPL-MCNC: 9.8 MG/DL (ref 8.7–10.5)
CHLORIDE SERPL-SCNC: 114 MMOL/L (ref 95–110)
CO2 SERPL-SCNC: 12 MMOL/L (ref 23–29)
CREAT SERPL-MCNC: 1.5 MG/DL (ref 0.5–1.4)
CTP QC/QA: YES
DIFFERENTIAL METHOD: ABNORMAL
DIFFERENTIAL METHOD: ABNORMAL
EOSINOPHIL # BLD AUTO: 0.1 K/UL (ref 0–0.5)
EOSINOPHIL # BLD AUTO: 0.1 K/UL (ref 0–0.5)
EOSINOPHIL NFR BLD: 1 % (ref 0–8)
EOSINOPHIL NFR BLD: 1.2 % (ref 0–8)
ERYTHROCYTE [DISTWIDTH] IN BLOOD BY AUTOMATED COUNT: 15.7 % (ref 11.5–14.5)
ERYTHROCYTE [DISTWIDTH] IN BLOOD BY AUTOMATED COUNT: 15.7 % (ref 11.5–14.5)
EST. GFR  (AFRICAN AMERICAN): 44 ML/MIN/1.73 M^2
EST. GFR  (NON AFRICAN AMERICAN): 38.1 ML/MIN/1.73 M^2
GLUCOSE SERPL-MCNC: 67 MG/DL (ref 70–110)
HCT VFR BLD AUTO: 36.4 % (ref 37–48.5)
HCT VFR BLD AUTO: 37.9 % (ref 37–48.5)
HGB BLD-MCNC: 10.7 G/DL (ref 12–16)
HGB BLD-MCNC: 11.2 G/DL (ref 12–16)
IMM GRANULOCYTES # BLD AUTO: 0.03 K/UL (ref 0–0.04)
IMM GRANULOCYTES # BLD AUTO: 0.09 K/UL (ref 0–0.04)
IMM GRANULOCYTES NFR BLD AUTO: 0.3 % (ref 0–0.5)
IMM GRANULOCYTES NFR BLD AUTO: 0.8 % (ref 0–0.5)
LYMPHOCYTES # BLD AUTO: 1 K/UL (ref 1–4.8)
LYMPHOCYTES # BLD AUTO: 1.1 K/UL (ref 1–4.8)
LYMPHOCYTES NFR BLD: 10.9 % (ref 18–48)
LYMPHOCYTES NFR BLD: 9 % (ref 18–48)
MAGNESIUM SERPL-MCNC: 2 MG/DL (ref 1.6–2.6)
MCH RBC QN AUTO: 27.4 PG (ref 27–31)
MCH RBC QN AUTO: 28.7 PG (ref 27–31)
MCHC RBC AUTO-ENTMCNC: 28.2 G/DL (ref 32–36)
MCHC RBC AUTO-ENTMCNC: 30.8 G/DL (ref 32–36)
MCV RBC AUTO: 93 FL (ref 82–98)
MCV RBC AUTO: 97 FL (ref 82–98)
MONOCYTES # BLD AUTO: 1.5 K/UL (ref 0.3–1)
MONOCYTES # BLD AUTO: 1.6 K/UL (ref 0.3–1)
MONOCYTES NFR BLD: 14.4 % (ref 4–15)
MONOCYTES NFR BLD: 14.9 % (ref 4–15)
NEUTROPHILS # BLD AUTO: 7.4 K/UL (ref 1.8–7.7)
NEUTROPHILS # BLD AUTO: 8.4 K/UL (ref 1.8–7.7)
NEUTROPHILS NFR BLD: 72.6 % (ref 38–73)
NEUTROPHILS NFR BLD: 74.2 % (ref 38–73)
NRBC BLD-RTO: 0 /100 WBC
NRBC BLD-RTO: 0 /100 WBC
PHOSPHATE SERPL-MCNC: 5 MG/DL (ref 2.7–4.5)
PLATELET # BLD AUTO: 241 K/UL (ref 150–450)
PLATELET # BLD AUTO: 276 K/UL (ref 150–450)
PMV BLD AUTO: 10.8 FL (ref 9.2–12.9)
PMV BLD AUTO: 9.7 FL (ref 9.2–12.9)
POTASSIUM SERPL-SCNC: 4.1 MMOL/L (ref 3.5–5.1)
RBC # BLD AUTO: 3.9 M/UL (ref 4–5.4)
RBC # BLD AUTO: 3.91 M/UL (ref 4–5.4)
SARS-COV-2 RDRP RESP QL NAA+PROBE: NEGATIVE
SODIUM SERPL-SCNC: 137 MMOL/L (ref 136–145)
VANCOMYCIN SERPL-MCNC: 9 UG/ML
WBC # BLD AUTO: 10.25 K/UL (ref 3.9–12.7)
WBC # BLD AUTO: 10.35 K/UL (ref 3.9–12.7)

## 2021-06-18 PROCEDURE — 99223 1ST HOSP IP/OBS HIGH 75: CPT | Mod: ,,, | Performed by: PHYSICIAN ASSISTANT

## 2021-06-18 PROCEDURE — 36415 COLL VENOUS BLD VENIPUNCTURE: CPT | Performed by: HOSPITALIST

## 2021-06-18 PROCEDURE — 63600175 PHARM REV CODE 636 W HCPCS: Performed by: INTERNAL MEDICINE

## 2021-06-18 PROCEDURE — 25000003 PHARM REV CODE 250: Performed by: HOSPITALIST

## 2021-06-18 PROCEDURE — 99232 PR SUBSEQUENT HOSPITAL CARE,LEVL II: ICD-10-PCS | Mod: ,,, | Performed by: HOSPITALIST

## 2021-06-18 PROCEDURE — 83735 ASSAY OF MAGNESIUM: CPT | Performed by: INTERNAL MEDICINE

## 2021-06-18 PROCEDURE — 36415 COLL VENOUS BLD VENIPUNCTURE: CPT | Performed by: INTERNAL MEDICINE

## 2021-06-18 PROCEDURE — 11000001 HC ACUTE MED/SURG PRIVATE ROOM

## 2021-06-18 PROCEDURE — 84100 ASSAY OF PHOSPHORUS: CPT | Performed by: INTERNAL MEDICINE

## 2021-06-18 PROCEDURE — 99232 SBSQ HOSP IP/OBS MODERATE 35: CPT | Mod: ,,, | Performed by: HOSPITALIST

## 2021-06-18 PROCEDURE — 63600175 PHARM REV CODE 636 W HCPCS: Performed by: RADIOLOGY

## 2021-06-18 PROCEDURE — 80048 BASIC METABOLIC PNL TOTAL CA: CPT | Performed by: INTERNAL MEDICINE

## 2021-06-18 PROCEDURE — 99223 PR INITIAL HOSPITAL CARE,LEVL III: ICD-10-PCS | Mod: ,,, | Performed by: PHYSICIAN ASSISTANT

## 2021-06-18 PROCEDURE — 25000003 PHARM REV CODE 250: Performed by: INTERNAL MEDICINE

## 2021-06-18 PROCEDURE — 80202 ASSAY OF VANCOMYCIN: CPT | Performed by: INTERNAL MEDICINE

## 2021-06-18 PROCEDURE — 85025 COMPLETE CBC W/AUTO DIFF WBC: CPT | Performed by: HOSPITALIST

## 2021-06-18 PROCEDURE — U0002 COVID-19 LAB TEST NON-CDC: HCPCS | Performed by: PHYSICIAN ASSISTANT

## 2021-06-18 PROCEDURE — 63600175 PHARM REV CODE 636 W HCPCS: Performed by: HOSPITALIST

## 2021-06-18 PROCEDURE — 25000003 PHARM REV CODE 250: Performed by: RADIOLOGY

## 2021-06-18 RX ORDER — LIDOCAINE HYDROCHLORIDE 10 MG/ML
INJECTION INFILTRATION; PERINEURAL CODE/TRAUMA/SEDATION MEDICATION
Status: COMPLETED | OUTPATIENT
Start: 2021-06-18 | End: 2021-06-18

## 2021-06-18 RX ORDER — CEFTRIAXONE 1 G/1
INJECTION, POWDER, FOR SOLUTION INTRAMUSCULAR; INTRAVENOUS CODE/TRAUMA/SEDATION MEDICATION
Status: COMPLETED | OUTPATIENT
Start: 2021-06-18 | End: 2021-06-18

## 2021-06-18 RX ORDER — VANCOMYCIN HCL IN 5 % DEXTROSE 1G/250ML
1000 PLASTIC BAG, INJECTION (ML) INTRAVENOUS
Status: DISCONTINUED | OUTPATIENT
Start: 2021-06-18 | End: 2021-06-18

## 2021-06-18 RX ORDER — CEFEPIME HYDROCHLORIDE 2 G/1
2 INJECTION, POWDER, FOR SOLUTION INTRAVENOUS
Status: DISCONTINUED | OUTPATIENT
Start: 2021-06-18 | End: 2021-06-22 | Stop reason: HOSPADM

## 2021-06-18 RX ORDER — SODIUM CHLORIDE 9 MG/ML
INJECTION, SOLUTION INTRAVENOUS ONCE
Status: COMPLETED | OUTPATIENT
Start: 2021-06-18 | End: 2021-06-18

## 2021-06-18 RX ORDER — FENTANYL CITRATE 50 UG/ML
INJECTION, SOLUTION INTRAMUSCULAR; INTRAVENOUS CODE/TRAUMA/SEDATION MEDICATION
Status: COMPLETED | OUTPATIENT
Start: 2021-06-18 | End: 2021-06-18

## 2021-06-18 RX ORDER — SODIUM CHLORIDE 9 MG/ML
INJECTION, SOLUTION INTRAVENOUS CONTINUOUS
Status: DISCONTINUED | OUTPATIENT
Start: 2021-06-18 | End: 2021-06-18

## 2021-06-18 RX ORDER — VANCOMYCIN HCL IN 5 % DEXTROSE 1G/250ML
15 PLASTIC BAG, INJECTION (ML) INTRAVENOUS ONCE
Status: DISCONTINUED | OUTPATIENT
Start: 2021-06-18 | End: 2021-06-18

## 2021-06-18 RX ADMIN — APIXABAN 5 MG: 5 TABLET, FILM COATED ORAL at 09:06

## 2021-06-18 RX ADMIN — PIPERACILLIN SODIUM AND TAZOBACTAM SODIUM 4.5 G: 4; .5 INJECTION, POWDER, FOR SOLUTION INTRAVENOUS at 03:06

## 2021-06-18 RX ADMIN — GABAPENTIN 100 MG: 100 CAPSULE ORAL at 09:06

## 2021-06-18 RX ADMIN — SODIUM BICARBONATE 650 MG TABLET 1300 MG: at 09:06

## 2021-06-18 RX ADMIN — MIRTAZAPINE 15 MG: 15 TABLET, ORALLY DISINTEGRATING ORAL at 02:06

## 2021-06-18 RX ADMIN — FENTANYL CITRATE 50 MCG: 50 INJECTION INTRAMUSCULAR; INTRAVENOUS at 04:06

## 2021-06-18 RX ADMIN — MIRTAZAPINE 15 MG: 15 TABLET, ORALLY DISINTEGRATING ORAL at 09:06

## 2021-06-18 RX ADMIN — PANTOPRAZOLE SODIUM 40 MG: 40 TABLET, DELAYED RELEASE ORAL at 09:06

## 2021-06-18 RX ADMIN — LIDOCAINE HYDROCHLORIDE 4 ML: 10 INJECTION, SOLUTION INFILTRATION; PERINEURAL at 04:06

## 2021-06-18 RX ADMIN — SODIUM CHLORIDE: 0.9 INJECTION, SOLUTION INTRAVENOUS at 12:06

## 2021-06-18 RX ADMIN — CEFTRIAXONE 1 G: 1 INJECTION, POWDER, FOR SOLUTION INTRAMUSCULAR; INTRAVENOUS at 04:06

## 2021-06-18 RX ADMIN — CEFEPIME 2 G: 2 INJECTION, POWDER, FOR SOLUTION INTRAVENOUS at 06:06

## 2021-06-18 RX ADMIN — PIPERACILLIN SODIUM AND TAZOBACTAM SODIUM 4.5 G: 4; .5 INJECTION, POWDER, FOR SOLUTION INTRAVENOUS at 07:06

## 2021-06-19 LAB
BASOPHILS # BLD AUTO: 0.03 K/UL (ref 0–0.2)
BASOPHILS NFR BLD: 0.4 % (ref 0–1.9)
DIFFERENTIAL METHOD: ABNORMAL
EOSINOPHIL # BLD AUTO: 0.1 K/UL (ref 0–0.5)
EOSINOPHIL NFR BLD: 1.2 % (ref 0–8)
ERYTHROCYTE [DISTWIDTH] IN BLOOD BY AUTOMATED COUNT: 15.7 % (ref 11.5–14.5)
HCT VFR BLD AUTO: 33.5 % (ref 37–48.5)
HGB BLD-MCNC: 10 G/DL (ref 12–16)
IMM GRANULOCYTES # BLD AUTO: 0.03 K/UL (ref 0–0.04)
IMM GRANULOCYTES NFR BLD AUTO: 0.4 % (ref 0–0.5)
LYMPHOCYTES # BLD AUTO: 1.2 K/UL (ref 1–4.8)
LYMPHOCYTES NFR BLD: 15 % (ref 18–48)
MAGNESIUM SERPL-MCNC: 1.9 MG/DL (ref 1.6–2.6)
MCH RBC QN AUTO: 28.2 PG (ref 27–31)
MCHC RBC AUTO-ENTMCNC: 29.9 G/DL (ref 32–36)
MCV RBC AUTO: 94 FL (ref 82–98)
MONOCYTES # BLD AUTO: 1.4 K/UL (ref 0.3–1)
MONOCYTES NFR BLD: 16.9 % (ref 4–15)
NEUTROPHILS # BLD AUTO: 5.4 K/UL (ref 1.8–7.7)
NEUTROPHILS NFR BLD: 66.1 % (ref 38–73)
NRBC BLD-RTO: 0 /100 WBC
PHOSPHATE SERPL-MCNC: 4.4 MG/DL (ref 2.7–4.5)
PLATELET # BLD AUTO: 270 K/UL (ref 150–450)
PMV BLD AUTO: 9.7 FL (ref 9.2–12.9)
RBC # BLD AUTO: 3.55 M/UL (ref 4–5.4)
WBC # BLD AUTO: 8.18 K/UL (ref 3.9–12.7)

## 2021-06-19 PROCEDURE — 83735 ASSAY OF MAGNESIUM: CPT | Performed by: INTERNAL MEDICINE

## 2021-06-19 PROCEDURE — 63600175 PHARM REV CODE 636 W HCPCS: Performed by: HOSPITALIST

## 2021-06-19 PROCEDURE — 99232 PR SUBSEQUENT HOSPITAL CARE,LEVL II: ICD-10-PCS | Mod: ,,, | Performed by: INTERNAL MEDICINE

## 2021-06-19 PROCEDURE — 99232 SBSQ HOSP IP/OBS MODERATE 35: CPT | Mod: ,,, | Performed by: INTERNAL MEDICINE

## 2021-06-19 PROCEDURE — 84100 ASSAY OF PHOSPHORUS: CPT | Performed by: INTERNAL MEDICINE

## 2021-06-19 PROCEDURE — 25000003 PHARM REV CODE 250: Performed by: INTERNAL MEDICINE

## 2021-06-19 PROCEDURE — 25000003 PHARM REV CODE 250: Performed by: HOSPITALIST

## 2021-06-19 PROCEDURE — 85025 COMPLETE CBC W/AUTO DIFF WBC: CPT | Performed by: INTERNAL MEDICINE

## 2021-06-19 PROCEDURE — 11000001 HC ACUTE MED/SURG PRIVATE ROOM

## 2021-06-19 PROCEDURE — 36415 COLL VENOUS BLD VENIPUNCTURE: CPT | Performed by: INTERNAL MEDICINE

## 2021-06-19 RX ADMIN — GABAPENTIN 100 MG: 100 CAPSULE ORAL at 08:06

## 2021-06-19 RX ADMIN — MIRTAZAPINE 15 MG: 15 TABLET, ORALLY DISINTEGRATING ORAL at 08:06

## 2021-06-19 RX ADMIN — PANTOPRAZOLE SODIUM 40 MG: 40 TABLET, DELAYED RELEASE ORAL at 08:06

## 2021-06-19 RX ADMIN — CEFEPIME 2 G: 2 INJECTION, POWDER, FOR SOLUTION INTRAVENOUS at 05:06

## 2021-06-19 RX ADMIN — SODIUM BICARBONATE 650 MG TABLET 1300 MG: at 08:06

## 2021-06-19 RX ADMIN — Medication 1 CAPSULE: at 08:06

## 2021-06-20 LAB
BACTERIA UR CULT: ABNORMAL
BASOPHILS # BLD AUTO: 0.03 K/UL (ref 0–0.2)
BASOPHILS NFR BLD: 0.4 % (ref 0–1.9)
DIFFERENTIAL METHOD: ABNORMAL
EOSINOPHIL # BLD AUTO: 0.3 K/UL (ref 0–0.5)
EOSINOPHIL NFR BLD: 3.7 % (ref 0–8)
ERYTHROCYTE [DISTWIDTH] IN BLOOD BY AUTOMATED COUNT: 15.8 % (ref 11.5–14.5)
HCT VFR BLD AUTO: 33.1 % (ref 37–48.5)
HGB BLD-MCNC: 9.6 G/DL (ref 12–16)
IMM GRANULOCYTES # BLD AUTO: 0.03 K/UL (ref 0–0.04)
IMM GRANULOCYTES NFR BLD AUTO: 0.4 % (ref 0–0.5)
LYMPHOCYTES # BLD AUTO: 0.9 K/UL (ref 1–4.8)
LYMPHOCYTES NFR BLD: 12.4 % (ref 18–48)
MAGNESIUM SERPL-MCNC: 1.8 MG/DL (ref 1.6–2.6)
MCH RBC QN AUTO: 27.4 PG (ref 27–31)
MCHC RBC AUTO-ENTMCNC: 29 G/DL (ref 32–36)
MCV RBC AUTO: 95 FL (ref 82–98)
MONOCYTES # BLD AUTO: 1.1 K/UL (ref 0.3–1)
MONOCYTES NFR BLD: 15.2 % (ref 4–15)
NEUTROPHILS # BLD AUTO: 4.8 K/UL (ref 1.8–7.7)
NEUTROPHILS NFR BLD: 67.9 % (ref 38–73)
NRBC BLD-RTO: 0 /100 WBC
PHOSPHATE SERPL-MCNC: 3.4 MG/DL (ref 2.7–4.5)
PLATELET # BLD AUTO: 285 K/UL (ref 150–450)
PMV BLD AUTO: 9.9 FL (ref 9.2–12.9)
RBC # BLD AUTO: 3.5 M/UL (ref 4–5.4)
WBC # BLD AUTO: 7.11 K/UL (ref 3.9–12.7)

## 2021-06-20 PROCEDURE — 63600175 PHARM REV CODE 636 W HCPCS: Performed by: HOSPITALIST

## 2021-06-20 PROCEDURE — 84100 ASSAY OF PHOSPHORUS: CPT | Performed by: INTERNAL MEDICINE

## 2021-06-20 PROCEDURE — 25000003 PHARM REV CODE 250: Performed by: INTERNAL MEDICINE

## 2021-06-20 PROCEDURE — 25000003 PHARM REV CODE 250: Performed by: HOSPITALIST

## 2021-06-20 PROCEDURE — 99232 PR SUBSEQUENT HOSPITAL CARE,LEVL II: ICD-10-PCS | Mod: ,,, | Performed by: INTERNAL MEDICINE

## 2021-06-20 PROCEDURE — 85025 COMPLETE CBC W/AUTO DIFF WBC: CPT | Performed by: INTERNAL MEDICINE

## 2021-06-20 PROCEDURE — 11000001 HC ACUTE MED/SURG PRIVATE ROOM

## 2021-06-20 PROCEDURE — 83735 ASSAY OF MAGNESIUM: CPT | Performed by: INTERNAL MEDICINE

## 2021-06-20 PROCEDURE — 99232 SBSQ HOSP IP/OBS MODERATE 35: CPT | Mod: ,,, | Performed by: INTERNAL MEDICINE

## 2021-06-20 PROCEDURE — 36415 COLL VENOUS BLD VENIPUNCTURE: CPT | Performed by: INTERNAL MEDICINE

## 2021-06-20 RX ADMIN — CEFEPIME 2 G: 2 INJECTION, POWDER, FOR SOLUTION INTRAVENOUS at 05:06

## 2021-06-20 RX ADMIN — SODIUM BICARBONATE 650 MG TABLET 1300 MG: at 09:06

## 2021-06-20 RX ADMIN — CEFEPIME 2 G: 2 INJECTION, POWDER, FOR SOLUTION INTRAVENOUS at 06:06

## 2021-06-20 RX ADMIN — PANTOPRAZOLE SODIUM 40 MG: 40 TABLET, DELAYED RELEASE ORAL at 09:06

## 2021-06-20 RX ADMIN — Medication 1 CAPSULE: at 09:06

## 2021-06-21 LAB
ANION GAP SERPL CALC-SCNC: 9 MMOL/L (ref 8–16)
BASOPHILS # BLD AUTO: 0.03 K/UL (ref 0–0.2)
BASOPHILS NFR BLD: 0.5 % (ref 0–1.9)
BUN SERPL-MCNC: 14 MG/DL (ref 6–20)
CALCIUM SERPL-MCNC: 9.8 MG/DL (ref 8.7–10.5)
CHLORIDE SERPL-SCNC: 114 MMOL/L (ref 95–110)
CO2 SERPL-SCNC: 20 MMOL/L (ref 23–29)
CREAT SERPL-MCNC: 1.4 MG/DL (ref 0.5–1.4)
DIFFERENTIAL METHOD: ABNORMAL
EOSINOPHIL # BLD AUTO: 0.3 K/UL (ref 0–0.5)
EOSINOPHIL NFR BLD: 4.2 % (ref 0–8)
ERYTHROCYTE [DISTWIDTH] IN BLOOD BY AUTOMATED COUNT: 15 % (ref 11.5–14.5)
EST. GFR  (AFRICAN AMERICAN): 47.8 ML/MIN/1.73 M^2
EST. GFR  (NON AFRICAN AMERICAN): 41.4 ML/MIN/1.73 M^2
GLUCOSE SERPL-MCNC: 86 MG/DL (ref 70–110)
HCT VFR BLD AUTO: 34.3 % (ref 37–48.5)
HGB BLD-MCNC: 10.2 G/DL (ref 12–16)
IMM GRANULOCYTES # BLD AUTO: 0.01 K/UL (ref 0–0.04)
IMM GRANULOCYTES NFR BLD AUTO: 0.2 % (ref 0–0.5)
LYMPHOCYTES # BLD AUTO: 1.1 K/UL (ref 1–4.8)
LYMPHOCYTES NFR BLD: 17.3 % (ref 18–48)
MAGNESIUM SERPL-MCNC: 1.7 MG/DL (ref 1.6–2.6)
MCH RBC QN AUTO: 28.2 PG (ref 27–31)
MCHC RBC AUTO-ENTMCNC: 29.7 G/DL (ref 32–36)
MCV RBC AUTO: 95 FL (ref 82–98)
MONOCYTES # BLD AUTO: 0.8 K/UL (ref 0.3–1)
MONOCYTES NFR BLD: 12.3 % (ref 4–15)
NEUTROPHILS # BLD AUTO: 4.1 K/UL (ref 1.8–7.7)
NEUTROPHILS NFR BLD: 65.5 % (ref 38–73)
NRBC BLD-RTO: 0 /100 WBC
PHOSPHATE SERPL-MCNC: 3.4 MG/DL (ref 2.7–4.5)
PLATELET # BLD AUTO: 294 K/UL (ref 150–450)
PLATELET BLD QL SMEAR: ABNORMAL
PMV BLD AUTO: 9.7 FL (ref 9.2–12.9)
POTASSIUM SERPL-SCNC: 3.3 MMOL/L (ref 3.5–5.1)
RBC # BLD AUTO: 3.62 M/UL (ref 4–5.4)
SODIUM SERPL-SCNC: 143 MMOL/L (ref 136–145)
WBC # BLD AUTO: 6.18 K/UL (ref 3.9–12.7)

## 2021-06-21 PROCEDURE — 99233 SBSQ HOSP IP/OBS HIGH 50: CPT | Mod: ,,, | Performed by: PHYSICIAN ASSISTANT

## 2021-06-21 PROCEDURE — 99232 SBSQ HOSP IP/OBS MODERATE 35: CPT | Mod: ,,, | Performed by: INTERNAL MEDICINE

## 2021-06-21 PROCEDURE — 85025 COMPLETE CBC W/AUTO DIFF WBC: CPT | Performed by: INTERNAL MEDICINE

## 2021-06-21 PROCEDURE — 11000001 HC ACUTE MED/SURG PRIVATE ROOM

## 2021-06-21 PROCEDURE — 36415 COLL VENOUS BLD VENIPUNCTURE: CPT | Performed by: INTERNAL MEDICINE

## 2021-06-21 PROCEDURE — 84100 ASSAY OF PHOSPHORUS: CPT | Performed by: INTERNAL MEDICINE

## 2021-06-21 PROCEDURE — 63600175 PHARM REV CODE 636 W HCPCS: Performed by: HOSPITALIST

## 2021-06-21 PROCEDURE — 80048 BASIC METABOLIC PNL TOTAL CA: CPT | Performed by: INTERNAL MEDICINE

## 2021-06-21 PROCEDURE — 25000003 PHARM REV CODE 250: Performed by: INTERNAL MEDICINE

## 2021-06-21 PROCEDURE — 83735 ASSAY OF MAGNESIUM: CPT | Performed by: INTERNAL MEDICINE

## 2021-06-21 PROCEDURE — 99233 PR SUBSEQUENT HOSPITAL CARE,LEVL III: ICD-10-PCS | Mod: ,,, | Performed by: PHYSICIAN ASSISTANT

## 2021-06-21 PROCEDURE — 25000003 PHARM REV CODE 250: Performed by: HOSPITALIST

## 2021-06-21 PROCEDURE — 99232 PR SUBSEQUENT HOSPITAL CARE,LEVL II: ICD-10-PCS | Mod: ,,, | Performed by: INTERNAL MEDICINE

## 2021-06-21 RX ADMIN — PANTOPRAZOLE SODIUM 40 MG: 40 TABLET, DELAYED RELEASE ORAL at 09:06

## 2021-06-21 RX ADMIN — GABAPENTIN 100 MG: 100 CAPSULE ORAL at 08:06

## 2021-06-21 RX ADMIN — SODIUM BICARBONATE 650 MG TABLET 1300 MG: at 09:06

## 2021-06-21 RX ADMIN — MIRTAZAPINE 15 MG: 15 TABLET, ORALLY DISINTEGRATING ORAL at 08:06

## 2021-06-21 RX ADMIN — Medication 1 CAPSULE: at 09:06

## 2021-06-21 RX ADMIN — MELATONIN TAB 3 MG 6 MG: 3 TAB at 08:06

## 2021-06-21 RX ADMIN — SODIUM BICARBONATE 650 MG TABLET 1300 MG: at 08:06

## 2021-06-21 RX ADMIN — CEFEPIME 2 G: 2 INJECTION, POWDER, FOR SOLUTION INTRAVENOUS at 05:06

## 2021-06-22 VITALS
WEIGHT: 148 LBS | DIASTOLIC BLOOD PRESSURE: 62 MMHG | HEART RATE: 76 BPM | SYSTOLIC BLOOD PRESSURE: 105 MMHG | RESPIRATION RATE: 18 BRPM | HEIGHT: 69 IN | BODY MASS INDEX: 21.92 KG/M2 | TEMPERATURE: 98 F | OXYGEN SATURATION: 100 %

## 2021-06-22 LAB
BACTERIA BLD CULT: NORMAL
BACTERIA BLD CULT: NORMAL
BASOPHILS # BLD AUTO: 0.03 K/UL (ref 0–0.2)
BASOPHILS NFR BLD: 0.6 % (ref 0–1.9)
DIFFERENTIAL METHOD: ABNORMAL
EOSINOPHIL # BLD AUTO: 0.2 K/UL (ref 0–0.5)
EOSINOPHIL NFR BLD: 4.6 % (ref 0–8)
ERYTHROCYTE [DISTWIDTH] IN BLOOD BY AUTOMATED COUNT: 14.8 % (ref 11.5–14.5)
HCT VFR BLD AUTO: 36.3 % (ref 37–48.5)
HGB BLD-MCNC: 10.8 G/DL (ref 12–16)
IMM GRANULOCYTES # BLD AUTO: 0.01 K/UL (ref 0–0.04)
IMM GRANULOCYTES NFR BLD AUTO: 0.2 % (ref 0–0.5)
LYMPHOCYTES # BLD AUTO: 1.2 K/UL (ref 1–4.8)
LYMPHOCYTES NFR BLD: 22.8 % (ref 18–48)
MAGNESIUM SERPL-MCNC: 1.7 MG/DL (ref 1.6–2.6)
MCH RBC QN AUTO: 28.3 PG (ref 27–31)
MCHC RBC AUTO-ENTMCNC: 29.8 G/DL (ref 32–36)
MCV RBC AUTO: 95 FL (ref 82–98)
MONOCYTES # BLD AUTO: 0.7 K/UL (ref 0.3–1)
MONOCYTES NFR BLD: 13.1 % (ref 4–15)
NEUTROPHILS # BLD AUTO: 3.1 K/UL (ref 1.8–7.7)
NEUTROPHILS NFR BLD: 58.7 % (ref 38–73)
NRBC BLD-RTO: 0 /100 WBC
PHOSPHATE SERPL-MCNC: 3.4 MG/DL (ref 2.7–4.5)
PLATELET # BLD AUTO: 325 K/UL (ref 150–450)
PMV BLD AUTO: 9.5 FL (ref 9.2–12.9)
RBC # BLD AUTO: 3.82 M/UL (ref 4–5.4)
WBC # BLD AUTO: 5.27 K/UL (ref 3.9–12.7)

## 2021-06-22 PROCEDURE — 25000003 PHARM REV CODE 250: Performed by: HOSPITALIST

## 2021-06-22 PROCEDURE — 85025 COMPLETE CBC W/AUTO DIFF WBC: CPT | Performed by: INTERNAL MEDICINE

## 2021-06-22 PROCEDURE — 63600175 PHARM REV CODE 636 W HCPCS: Performed by: HOSPITALIST

## 2021-06-22 PROCEDURE — 25000003 PHARM REV CODE 250: Performed by: INTERNAL MEDICINE

## 2021-06-22 PROCEDURE — 99232 PR SUBSEQUENT HOSPITAL CARE,LEVL II: ICD-10-PCS | Mod: ,,, | Performed by: INTERNAL MEDICINE

## 2021-06-22 PROCEDURE — 83735 ASSAY OF MAGNESIUM: CPT | Performed by: INTERNAL MEDICINE

## 2021-06-22 PROCEDURE — 99232 SBSQ HOSP IP/OBS MODERATE 35: CPT | Mod: ,,, | Performed by: INTERNAL MEDICINE

## 2021-06-22 PROCEDURE — 36415 COLL VENOUS BLD VENIPUNCTURE: CPT | Performed by: INTERNAL MEDICINE

## 2021-06-22 PROCEDURE — 84100 ASSAY OF PHOSPHORUS: CPT | Performed by: INTERNAL MEDICINE

## 2021-06-22 RX ORDER — CIPROFLOXACIN 750 MG/1
750 TABLET, FILM COATED ORAL EVERY 12 HOURS
Qty: 20 TABLET | Refills: 0 | Status: SHIPPED | OUTPATIENT
Start: 2021-06-22 | End: 2021-06-22 | Stop reason: SDUPTHER

## 2021-06-22 RX ORDER — CIPROFLOXACIN 500 MG/1
500 TABLET ORAL EVERY 12 HOURS
Qty: 20 TABLET | Refills: 0 | Status: SHIPPED | OUTPATIENT
Start: 2021-06-22 | End: 2021-07-02

## 2021-06-22 RX ADMIN — PANTOPRAZOLE SODIUM 40 MG: 40 TABLET, DELAYED RELEASE ORAL at 08:06

## 2021-06-22 RX ADMIN — SODIUM BICARBONATE 650 MG TABLET 1300 MG: at 08:06

## 2021-06-22 RX ADMIN — Medication 1 CAPSULE: at 08:06

## 2021-06-22 RX ADMIN — CEFEPIME 2 G: 2 INJECTION, POWDER, FOR SOLUTION INTRAVENOUS at 04:06

## 2021-06-24 ENCOUNTER — PATIENT OUTREACH (OUTPATIENT)
Dept: ADMINISTRATIVE | Facility: CLINIC | Age: 56
End: 2021-06-24

## 2021-08-16 NOTE — ASSESSMENT & PLAN NOTE
-CRS managing  -wound care following  -no leakage from ileostomy overnight on 12/15 per CRS   increase to Lexapro 10mg daily. followup with outpatient referral, 2 week follow up in urgi

## 2021-08-30 ENCOUNTER — HOSPITAL ENCOUNTER (INPATIENT)
Facility: HOSPITAL | Age: 56
LOS: 7 days | Discharge: HOME | DRG: 698 | End: 2021-09-06
Attending: EMERGENCY MEDICINE | Admitting: EMERGENCY MEDICINE
Payer: MEDICAID

## 2021-08-30 DIAGNOSIS — R07.9 CHEST PAIN: ICD-10-CM

## 2021-08-30 DIAGNOSIS — R65.10 SIRS (SYSTEMIC INFLAMMATORY RESPONSE SYNDROME): ICD-10-CM

## 2021-08-30 DIAGNOSIS — E87.1 HYPONATREMIA: ICD-10-CM

## 2021-08-30 DIAGNOSIS — I95.9 HYPOTENSION, UNSPECIFIED HYPOTENSION TYPE: ICD-10-CM

## 2021-08-30 DIAGNOSIS — Z93.2 ILEOSTOMY IN PLACE: Chronic | ICD-10-CM

## 2021-08-30 DIAGNOSIS — Z93.6 NEPHROSTOMY STATUS: Chronic | ICD-10-CM

## 2021-08-30 DIAGNOSIS — N13.30 HYDRONEPHROSIS, UNSPECIFIED HYDRONEPHROSIS TYPE: Chronic | ICD-10-CM

## 2021-08-30 DIAGNOSIS — Z93.6 PRESENCE OF UROSTOMY: ICD-10-CM

## 2021-08-30 DIAGNOSIS — N39.0 URINARY TRACT INFECTION WITH HEMATURIA, SITE UNSPECIFIED: Primary | ICD-10-CM

## 2021-08-30 DIAGNOSIS — Z43.3 COLOSTOMY CARE: ICD-10-CM

## 2021-08-30 DIAGNOSIS — R31.9 URINARY TRACT INFECTION WITH HEMATURIA, SITE UNSPECIFIED: Primary | ICD-10-CM

## 2021-08-30 LAB
ALBUMIN SERPL BCP-MCNC: 3.4 G/DL (ref 3.5–5.2)
ALP SERPL-CCNC: 110 U/L (ref 55–135)
ALT SERPL W/O P-5'-P-CCNC: 11 U/L (ref 10–44)
ANION GAP SERPL CALC-SCNC: 10 MMOL/L (ref 8–16)
AST SERPL-CCNC: 17 U/L (ref 10–40)
BACTERIA #/AREA URNS AUTO: ABNORMAL /HPF
BACTERIA #/AREA URNS AUTO: ABNORMAL /HPF
BASOPHILS # BLD AUTO: 0.04 K/UL (ref 0–0.2)
BASOPHILS NFR BLD: 0.5 % (ref 0–1.9)
BILIRUB SERPL-MCNC: 0.2 MG/DL (ref 0.1–1)
BILIRUB UR QL STRIP: NEGATIVE
BUN SERPL-MCNC: 44 MG/DL (ref 6–20)
BUN SERPL-MCNC: 61 MG/DL (ref 6–30)
CALCIUM SERPL-MCNC: 9.4 MG/DL (ref 8.7–10.5)
CHLORIDE SERPL-SCNC: 105 MMOL/L (ref 95–110)
CHLORIDE SERPL-SCNC: 111 MMOL/L (ref 95–110)
CLARITY UR REFRACT.AUTO: ABNORMAL
CO2 SERPL-SCNC: 12 MMOL/L (ref 23–29)
COLOR UR AUTO: YELLOW
CREAT SERPL-MCNC: 2.2 MG/DL (ref 0.5–1.4)
CREAT SERPL-MCNC: 2.3 MG/DL (ref 0.5–1.4)
CTP QC/QA: YES
DIFFERENTIAL METHOD: ABNORMAL
EOSINOPHIL # BLD AUTO: 0.2 K/UL (ref 0–0.5)
EOSINOPHIL NFR BLD: 1.9 % (ref 0–8)
ERYTHROCYTE [DISTWIDTH] IN BLOOD BY AUTOMATED COUNT: 14.3 % (ref 11.5–14.5)
EST. GFR  (AFRICAN AMERICAN): 27.7 ML/MIN/1.73 M^2
EST. GFR  (NON AFRICAN AMERICAN): 24 ML/MIN/1.73 M^2
GLUCOSE SERPL-MCNC: 82 MG/DL (ref 70–110)
GLUCOSE SERPL-MCNC: 89 MG/DL (ref 70–110)
GLUCOSE UR QL STRIP: NEGATIVE
HCT VFR BLD AUTO: 37.6 % (ref 37–48.5)
HCT VFR BLD CALC: 38 %PCV (ref 36–54)
HGB BLD-MCNC: 11.4 G/DL (ref 12–16)
HGB UR QL STRIP: ABNORMAL
HYALINE CASTS UR QL AUTO: 0 /LPF
HYALINE CASTS UR QL AUTO: 9 /LPF
IMM GRANULOCYTES # BLD AUTO: 0.02 K/UL (ref 0–0.04)
IMM GRANULOCYTES NFR BLD AUTO: 0.3 % (ref 0–0.5)
KETONES UR QL STRIP: NEGATIVE
LACTATE SERPL-SCNC: 1.3 MMOL/L (ref 0.5–2.2)
LEUKOCYTE ESTERASE UR QL STRIP: ABNORMAL
LYMPHOCYTES # BLD AUTO: 2.1 K/UL (ref 1–4.8)
LYMPHOCYTES NFR BLD: 26.6 % (ref 18–48)
MAGNESIUM SERPL-MCNC: 1.9 MG/DL (ref 1.6–2.6)
MCH RBC QN AUTO: 27.1 PG (ref 27–31)
MCHC RBC AUTO-ENTMCNC: 30.3 G/DL (ref 32–36)
MCV RBC AUTO: 90 FL (ref 82–98)
MICROSCOPIC COMMENT: ABNORMAL
MICROSCOPIC COMMENT: ABNORMAL
MONOCYTES # BLD AUTO: 1.2 K/UL (ref 0.3–1)
MONOCYTES NFR BLD: 14.8 % (ref 4–15)
NEUTROPHILS # BLD AUTO: 4.4 K/UL (ref 1.8–7.7)
NEUTROPHILS NFR BLD: 55.9 % (ref 38–73)
NITRITE UR QL STRIP: NEGATIVE
NITRITE UR QL STRIP: POSITIVE
NITRITE UR QL STRIP: POSITIVE
NRBC BLD-RTO: 0 /100 WBC
PH UR STRIP: 5 [PH] (ref 5–8)
PLATELET # BLD AUTO: 200 K/UL (ref 150–450)
PMV BLD AUTO: 11 FL (ref 9.2–12.9)
POC IONIZED CALCIUM: 1.24 MMOL/L (ref 1.06–1.42)
POC TCO2 (MEASURED): 16 MMOL/L (ref 23–29)
POTASSIUM BLD-SCNC: 5.2 MMOL/L (ref 3.5–5.1)
POTASSIUM SERPL-SCNC: 4.6 MMOL/L (ref 3.5–5.1)
PROCALCITONIN SERPL IA-MCNC: 0.06 NG/ML
PROT SERPL-MCNC: 7.7 G/DL (ref 6–8.4)
PROT UR QL STRIP: ABNORMAL
RBC # BLD AUTO: 4.2 M/UL (ref 4–5.4)
RBC #/AREA URNS AUTO: 57 /HPF (ref 0–4)
RBC #/AREA URNS AUTO: >100 /HPF (ref 0–4)
SAMPLE: ABNORMAL
SARS-COV-2 RDRP RESP QL NAA+PROBE: NEGATIVE
SODIUM BLD-SCNC: 134 MMOL/L (ref 136–145)
SODIUM SERPL-SCNC: 127 MMOL/L (ref 136–145)
SP GR UR STRIP: 1.01 (ref 1–1.03)
SQUAMOUS #/AREA URNS AUTO: 2 /HPF
SQUAMOUS #/AREA URNS AUTO: 3 /HPF
URN SPEC COLLECT METH UR: ABNORMAL
WBC # BLD AUTO: 7.86 K/UL (ref 3.9–12.7)
WBC #/AREA URNS AUTO: >100 /HPF (ref 0–5)
WBC #/AREA URNS AUTO: >100 /HPF (ref 0–5)
WBC CLUMPS UR QL AUTO: ABNORMAL
WBC CLUMPS UR QL AUTO: ABNORMAL

## 2021-08-30 PROCEDURE — 12000002 HC ACUTE/MED SURGE SEMI-PRIVATE ROOM

## 2021-08-30 PROCEDURE — 83605 ASSAY OF LACTIC ACID: CPT | Performed by: STUDENT IN AN ORGANIZED HEALTH CARE EDUCATION/TRAINING PROGRAM

## 2021-08-30 PROCEDURE — 83735 ASSAY OF MAGNESIUM: CPT | Performed by: STUDENT IN AN ORGANIZED HEALTH CARE EDUCATION/TRAINING PROGRAM

## 2021-08-30 PROCEDURE — 96365 THER/PROPH/DIAG IV INF INIT: CPT

## 2021-08-30 PROCEDURE — 63600175 PHARM REV CODE 636 W HCPCS: Performed by: STUDENT IN AN ORGANIZED HEALTH CARE EDUCATION/TRAINING PROGRAM

## 2021-08-30 PROCEDURE — 84145 PROCALCITONIN (PCT): CPT | Performed by: STUDENT IN AN ORGANIZED HEALTH CARE EDUCATION/TRAINING PROGRAM

## 2021-08-30 PROCEDURE — 93005 ELECTROCARDIOGRAM TRACING: CPT

## 2021-08-30 PROCEDURE — 85025 COMPLETE CBC W/AUTO DIFF WBC: CPT | Performed by: STUDENT IN AN ORGANIZED HEALTH CARE EDUCATION/TRAINING PROGRAM

## 2021-08-30 PROCEDURE — 93010 ELECTROCARDIOGRAM REPORT: CPT | Mod: ,,, | Performed by: INTERNAL MEDICINE

## 2021-08-30 PROCEDURE — 81001 URINALYSIS AUTO W/SCOPE: CPT | Performed by: STUDENT IN AN ORGANIZED HEALTH CARE EDUCATION/TRAINING PROGRAM

## 2021-08-30 PROCEDURE — 87086 URINE CULTURE/COLONY COUNT: CPT | Mod: 59 | Performed by: STUDENT IN AN ORGANIZED HEALTH CARE EDUCATION/TRAINING PROGRAM

## 2021-08-30 PROCEDURE — 93010 EKG 12-LEAD: ICD-10-PCS | Mod: ,,, | Performed by: INTERNAL MEDICINE

## 2021-08-30 PROCEDURE — 99291 CRITICAL CARE FIRST HOUR: CPT | Mod: CS,,, | Performed by: EMERGENCY MEDICINE

## 2021-08-30 PROCEDURE — U0002 COVID-19 LAB TEST NON-CDC: HCPCS | Performed by: STUDENT IN AN ORGANIZED HEALTH CARE EDUCATION/TRAINING PROGRAM

## 2021-08-30 PROCEDURE — 99285 EMERGENCY DEPT VISIT HI MDM: CPT | Mod: 25

## 2021-08-30 PROCEDURE — 99291 PR CRITICAL CARE, E/M 30-74 MINUTES: ICD-10-PCS | Mod: CS,,, | Performed by: EMERGENCY MEDICINE

## 2021-08-30 PROCEDURE — 80053 COMPREHEN METABOLIC PANEL: CPT | Performed by: STUDENT IN AN ORGANIZED HEALTH CARE EDUCATION/TRAINING PROGRAM

## 2021-08-30 PROCEDURE — 87040 BLOOD CULTURE FOR BACTERIA: CPT | Performed by: STUDENT IN AN ORGANIZED HEALTH CARE EDUCATION/TRAINING PROGRAM

## 2021-08-30 PROCEDURE — 96361 HYDRATE IV INFUSION ADD-ON: CPT

## 2021-08-30 RX ADMIN — CEFTRIAXONE 2 G: 2 INJECTION, SOLUTION INTRAVENOUS at 10:08

## 2021-08-31 PROBLEM — R65.20 SEVERE SEPSIS: Status: ACTIVE | Noted: 2021-04-16

## 2021-08-31 PROBLEM — R65.20 SEVERE SEPSIS: Status: RESOLVED | Noted: 2021-04-16 | Resolved: 2021-08-31

## 2021-08-31 PROBLEM — I95.9 HYPOTENSION: Status: ACTIVE | Noted: 2021-04-16

## 2021-08-31 PROBLEM — I95.9 HYPOTENSION: Status: ACTIVE | Noted: 2021-08-31

## 2021-08-31 PROBLEM — N39.0 URINARY TRACT INFECTION WITH HEMATURIA: Status: ACTIVE | Noted: 2021-08-31

## 2021-08-31 PROBLEM — R31.9 URINARY TRACT INFECTION WITH HEMATURIA: Status: ACTIVE | Noted: 2021-08-31

## 2021-08-31 PROBLEM — A41.9 SEVERE SEPSIS: Status: RESOLVED | Noted: 2021-04-16 | Resolved: 2021-08-31

## 2021-08-31 LAB
ALBUMIN SERPL BCP-MCNC: 2.9 G/DL (ref 3.5–5.2)
ALP SERPL-CCNC: 96 U/L (ref 55–135)
ALT SERPL W/O P-5'-P-CCNC: 9 U/L (ref 10–44)
ANION GAP SERPL CALC-SCNC: 8 MMOL/L (ref 8–16)
AST SERPL-CCNC: 10 U/L (ref 10–40)
BASOPHILS # BLD AUTO: 0.04 K/UL (ref 0–0.2)
BASOPHILS NFR BLD: 0.6 % (ref 0–1.9)
BILIRUB SERPL-MCNC: 0.3 MG/DL (ref 0.1–1)
BUN SERPL-MCNC: 30 MG/DL (ref 6–20)
CALCIUM SERPL-MCNC: 9.2 MG/DL (ref 8.7–10.5)
CHLORIDE SERPL-SCNC: 114 MMOL/L (ref 95–110)
CO2 SERPL-SCNC: 14 MMOL/L (ref 23–29)
CREAT SERPL-MCNC: 1.6 MG/DL (ref 0.5–1.4)
DIFFERENTIAL METHOD: ABNORMAL
EOSINOPHIL # BLD AUTO: 0.2 K/UL (ref 0–0.5)
EOSINOPHIL NFR BLD: 2.4 % (ref 0–8)
ERYTHROCYTE [DISTWIDTH] IN BLOOD BY AUTOMATED COUNT: 14.4 % (ref 11.5–14.5)
EST. GFR  (AFRICAN AMERICAN): 40.7 ML/MIN/1.73 M^2
EST. GFR  (NON AFRICAN AMERICAN): 35.3 ML/MIN/1.73 M^2
GLUCOSE SERPL-MCNC: 90 MG/DL (ref 70–110)
HCT VFR BLD AUTO: 35.7 % (ref 37–48.5)
HGB BLD-MCNC: 10.4 G/DL (ref 12–16)
IMM GRANULOCYTES # BLD AUTO: 0.02 K/UL (ref 0–0.04)
IMM GRANULOCYTES NFR BLD AUTO: 0.3 % (ref 0–0.5)
LYMPHOCYTES # BLD AUTO: 1.5 K/UL (ref 1–4.8)
LYMPHOCYTES NFR BLD: 24.4 % (ref 18–48)
MAGNESIUM SERPL-MCNC: 1.7 MG/DL (ref 1.6–2.6)
MCH RBC QN AUTO: 27.4 PG (ref 27–31)
MCHC RBC AUTO-ENTMCNC: 29.1 G/DL (ref 32–36)
MCV RBC AUTO: 94 FL (ref 82–98)
MONOCYTES # BLD AUTO: 0.9 K/UL (ref 0.3–1)
MONOCYTES NFR BLD: 14.9 % (ref 4–15)
NEUTROPHILS # BLD AUTO: 3.6 K/UL (ref 1.8–7.7)
NEUTROPHILS NFR BLD: 57.4 % (ref 38–73)
NRBC BLD-RTO: 0 /100 WBC
PLATELET # BLD AUTO: 207 K/UL (ref 150–450)
PMV BLD AUTO: 9.4 FL (ref 9.2–12.9)
POTASSIUM SERPL-SCNC: 4 MMOL/L (ref 3.5–5.1)
PROT SERPL-MCNC: 6.5 G/DL (ref 6–8.4)
RBC # BLD AUTO: 3.8 M/UL (ref 4–5.4)
SODIUM SERPL-SCNC: 136 MMOL/L (ref 136–145)
WBC # BLD AUTO: 6.32 K/UL (ref 3.9–12.7)

## 2021-08-31 PROCEDURE — 99223 1ST HOSP IP/OBS HIGH 75: CPT | Mod: ,,, | Performed by: INTERNAL MEDICINE

## 2021-08-31 PROCEDURE — 25000003 PHARM REV CODE 250: Performed by: STUDENT IN AN ORGANIZED HEALTH CARE EDUCATION/TRAINING PROGRAM

## 2021-08-31 PROCEDURE — 63600175 PHARM REV CODE 636 W HCPCS: Performed by: INTERNAL MEDICINE

## 2021-08-31 PROCEDURE — 99223 PR INITIAL HOSPITAL CARE,LEVL III: ICD-10-PCS | Mod: ,,, | Performed by: INTERNAL MEDICINE

## 2021-08-31 PROCEDURE — 20600001 HC STEP DOWN PRIVATE ROOM

## 2021-08-31 PROCEDURE — 63600175 PHARM REV CODE 636 W HCPCS: Performed by: STUDENT IN AN ORGANIZED HEALTH CARE EDUCATION/TRAINING PROGRAM

## 2021-08-31 PROCEDURE — 80053 COMPREHEN METABOLIC PANEL: CPT | Performed by: EMERGENCY MEDICINE

## 2021-08-31 PROCEDURE — 85025 COMPLETE CBC W/AUTO DIFF WBC: CPT | Performed by: STUDENT IN AN ORGANIZED HEALTH CARE EDUCATION/TRAINING PROGRAM

## 2021-08-31 PROCEDURE — 83735 ASSAY OF MAGNESIUM: CPT | Performed by: EMERGENCY MEDICINE

## 2021-08-31 RX ORDER — LINEZOLID 2 MG/ML
600 INJECTION, SOLUTION INTRAVENOUS
Status: DISCONTINUED | OUTPATIENT
Start: 2021-08-31 | End: 2021-09-02

## 2021-08-31 RX ORDER — MIRTAZAPINE 15 MG/1
15 TABLET, ORALLY DISINTEGRATING ORAL NIGHTLY
Status: DISCONTINUED | OUTPATIENT
Start: 2021-08-31 | End: 2021-08-31

## 2021-08-31 RX ORDER — ONDANSETRON 8 MG/1
8 TABLET, ORALLY DISINTEGRATING ORAL EVERY 8 HOURS PRN
Status: DISCONTINUED | OUTPATIENT
Start: 2021-08-31 | End: 2021-09-06 | Stop reason: HOSPADM

## 2021-08-31 RX ORDER — SODIUM BICARBONATE 650 MG/1
1300 TABLET ORAL 2 TIMES DAILY
Status: DISCONTINUED | OUTPATIENT
Start: 2021-08-31 | End: 2021-09-06 | Stop reason: HOSPADM

## 2021-08-31 RX ORDER — TALC
6 POWDER (GRAM) TOPICAL NIGHTLY PRN
Status: DISCONTINUED | OUTPATIENT
Start: 2021-08-31 | End: 2021-09-06 | Stop reason: HOSPADM

## 2021-08-31 RX ORDER — IBUPROFEN 200 MG
16 TABLET ORAL
Status: DISCONTINUED | OUTPATIENT
Start: 2021-08-31 | End: 2021-09-06 | Stop reason: HOSPADM

## 2021-08-31 RX ORDER — PANTOPRAZOLE SODIUM 40 MG/1
40 TABLET, DELAYED RELEASE ORAL DAILY
Status: DISCONTINUED | OUTPATIENT
Start: 2021-08-31 | End: 2021-09-06 | Stop reason: HOSPADM

## 2021-08-31 RX ORDER — TALC
6 POWDER (GRAM) TOPICAL NIGHTLY PRN
Status: DISCONTINUED | OUTPATIENT
Start: 2021-08-31 | End: 2021-08-31

## 2021-08-31 RX ORDER — OXYCODONE HYDROCHLORIDE 5 MG/1
5 TABLET ORAL EVERY 6 HOURS PRN
Status: DISCONTINUED | OUTPATIENT
Start: 2021-08-31 | End: 2021-09-06 | Stop reason: HOSPADM

## 2021-08-31 RX ORDER — PROCHLORPERAZINE EDISYLATE 5 MG/ML
5 INJECTION INTRAMUSCULAR; INTRAVENOUS EVERY 6 HOURS PRN
Status: DISCONTINUED | OUTPATIENT
Start: 2021-08-31 | End: 2021-09-06 | Stop reason: HOSPADM

## 2021-08-31 RX ORDER — GLUCAGON 1 MG
1 KIT INJECTION
Status: DISCONTINUED | OUTPATIENT
Start: 2021-08-31 | End: 2021-09-06 | Stop reason: HOSPADM

## 2021-08-31 RX ORDER — IBUPROFEN 200 MG
24 TABLET ORAL
Status: DISCONTINUED | OUTPATIENT
Start: 2021-08-31 | End: 2021-09-06 | Stop reason: HOSPADM

## 2021-08-31 RX ORDER — SODIUM CHLORIDE 0.9 % (FLUSH) 0.9 %
10 SYRINGE (ML) INJECTION
Status: DISCONTINUED | OUTPATIENT
Start: 2021-08-31 | End: 2021-09-06 | Stop reason: HOSPADM

## 2021-08-31 RX ORDER — ACETAMINOPHEN 500 MG
1000 TABLET ORAL EVERY 8 HOURS PRN
Status: DISCONTINUED | OUTPATIENT
Start: 2021-08-31 | End: 2021-09-06 | Stop reason: HOSPADM

## 2021-08-31 RX ORDER — GABAPENTIN 100 MG/1
100 CAPSULE ORAL NIGHTLY
Status: DISCONTINUED | OUTPATIENT
Start: 2021-08-31 | End: 2021-09-06 | Stop reason: HOSPADM

## 2021-08-31 RX ADMIN — GABAPENTIN 100 MG: 100 CAPSULE ORAL at 08:08

## 2021-08-31 RX ADMIN — APIXABAN 5 MG: 5 TABLET, FILM COATED ORAL at 08:08

## 2021-08-31 RX ADMIN — LINEZOLID 600 MG: 600 INJECTION, SOLUTION INTRAVENOUS at 06:08

## 2021-08-31 RX ADMIN — OXYCODONE 5 MG: 5 TABLET ORAL at 06:08

## 2021-08-31 RX ADMIN — PIPERACILLIN SODIUM AND TAZOBACTAM SODIUM 4.5 G: 4; .5 INJECTION, POWDER, FOR SOLUTION INTRAVENOUS at 07:08

## 2021-08-31 RX ADMIN — PANTOPRAZOLE SODIUM 40 MG: 40 TABLET, DELAYED RELEASE ORAL at 10:08

## 2021-08-31 RX ADMIN — APIXABAN 5 MG: 5 TABLET, FILM COATED ORAL at 10:08

## 2021-08-31 RX ADMIN — PIPERACILLIN SODIUM AND TAZOBACTAM SODIUM 4.5 G: 4; .5 INJECTION, POWDER, FOR SOLUTION INTRAVENOUS at 03:08

## 2021-08-31 RX ADMIN — PIPERACILLIN SODIUM AND TAZOBACTAM SODIUM 4.5 G: 4; .5 INJECTION, POWDER, FOR SOLUTION INTRAVENOUS at 11:08

## 2021-08-31 RX ADMIN — SODIUM BICARBONATE 650 MG TABLET 1300 MG: at 10:08

## 2021-08-31 RX ADMIN — SODIUM BICARBONATE 650 MG TABLET 1300 MG: at 08:08

## 2021-09-01 LAB
ALBUMIN SERPL BCP-MCNC: 2.8 G/DL (ref 3.5–5.2)
ALP SERPL-CCNC: 85 U/L (ref 55–135)
ALT SERPL W/O P-5'-P-CCNC: <5 U/L (ref 10–44)
ANION GAP SERPL CALC-SCNC: 7 MMOL/L (ref 8–16)
AST SERPL-CCNC: 9 U/L (ref 10–40)
BACTERIA UR CULT: NORMAL
BASOPHILS # BLD AUTO: 0.05 K/UL (ref 0–0.2)
BASOPHILS NFR BLD: 0.7 % (ref 0–1.9)
BILIRUB SERPL-MCNC: 0.3 MG/DL (ref 0.1–1)
BUN SERPL-MCNC: 20 MG/DL (ref 6–20)
CALCIUM SERPL-MCNC: 9.5 MG/DL (ref 8.7–10.5)
CHLORIDE SERPL-SCNC: 114 MMOL/L (ref 95–110)
CO2 SERPL-SCNC: 18 MMOL/L (ref 23–29)
CREAT SERPL-MCNC: 1.6 MG/DL (ref 0.5–1.4)
DIFFERENTIAL METHOD: ABNORMAL
EOSINOPHIL # BLD AUTO: 0.2 K/UL (ref 0–0.5)
EOSINOPHIL NFR BLD: 3.1 % (ref 0–8)
ERYTHROCYTE [DISTWIDTH] IN BLOOD BY AUTOMATED COUNT: 14.2 % (ref 11.5–14.5)
EST. GFR  (AFRICAN AMERICAN): 40.7 ML/MIN/1.73 M^2
EST. GFR  (NON AFRICAN AMERICAN): 35.3 ML/MIN/1.73 M^2
GLUCOSE SERPL-MCNC: 93 MG/DL (ref 70–110)
HCT VFR BLD AUTO: 34.3 % (ref 37–48.5)
HGB BLD-MCNC: 10.4 G/DL (ref 12–16)
IMM GRANULOCYTES # BLD AUTO: 0.02 K/UL (ref 0–0.04)
IMM GRANULOCYTES NFR BLD AUTO: 0.3 % (ref 0–0.5)
LYMPHOCYTES # BLD AUTO: 1.3 K/UL (ref 1–4.8)
LYMPHOCYTES NFR BLD: 19 % (ref 18–48)
MAGNESIUM SERPL-MCNC: 1.7 MG/DL (ref 1.6–2.6)
MCH RBC QN AUTO: 27 PG (ref 27–31)
MCHC RBC AUTO-ENTMCNC: 30.3 G/DL (ref 32–36)
MCV RBC AUTO: 89 FL (ref 82–98)
MONOCYTES # BLD AUTO: 0.9 K/UL (ref 0.3–1)
MONOCYTES NFR BLD: 13.9 % (ref 4–15)
NEUTROPHILS # BLD AUTO: 4.2 K/UL (ref 1.8–7.7)
NEUTROPHILS NFR BLD: 63 % (ref 38–73)
NRBC BLD-RTO: 0 /100 WBC
PLATELET # BLD AUTO: 224 K/UL (ref 150–450)
PMV BLD AUTO: 10 FL (ref 9.2–12.9)
POTASSIUM SERPL-SCNC: 4.3 MMOL/L (ref 3.5–5.1)
PROT SERPL-MCNC: 6.5 G/DL (ref 6–8.4)
RBC # BLD AUTO: 3.85 M/UL (ref 4–5.4)
SODIUM SERPL-SCNC: 139 MMOL/L (ref 136–145)
WBC # BLD AUTO: 6.67 K/UL (ref 3.9–12.7)

## 2021-09-01 PROCEDURE — 83735 ASSAY OF MAGNESIUM: CPT | Performed by: STUDENT IN AN ORGANIZED HEALTH CARE EDUCATION/TRAINING PROGRAM

## 2021-09-01 PROCEDURE — 85025 COMPLETE CBC W/AUTO DIFF WBC: CPT | Performed by: STUDENT IN AN ORGANIZED HEALTH CARE EDUCATION/TRAINING PROGRAM

## 2021-09-01 PROCEDURE — 99233 PR SUBSEQUENT HOSPITAL CARE,LEVL III: ICD-10-PCS | Mod: ,,, | Performed by: INTERNAL MEDICINE

## 2021-09-01 PROCEDURE — 99233 SBSQ HOSP IP/OBS HIGH 50: CPT | Mod: ,,, | Performed by: INTERNAL MEDICINE

## 2021-09-01 PROCEDURE — 97165 OT EVAL LOW COMPLEX 30 MIN: CPT

## 2021-09-01 PROCEDURE — 97161 PT EVAL LOW COMPLEX 20 MIN: CPT

## 2021-09-01 PROCEDURE — 63600175 PHARM REV CODE 636 W HCPCS: Performed by: STUDENT IN AN ORGANIZED HEALTH CARE EDUCATION/TRAINING PROGRAM

## 2021-09-01 PROCEDURE — 20600001 HC STEP DOWN PRIVATE ROOM

## 2021-09-01 PROCEDURE — 36415 COLL VENOUS BLD VENIPUNCTURE: CPT | Performed by: STUDENT IN AN ORGANIZED HEALTH CARE EDUCATION/TRAINING PROGRAM

## 2021-09-01 PROCEDURE — 25500020 PHARM REV CODE 255: Performed by: INTERNAL MEDICINE

## 2021-09-01 PROCEDURE — 97116 GAIT TRAINING THERAPY: CPT

## 2021-09-01 PROCEDURE — 97530 THERAPEUTIC ACTIVITIES: CPT

## 2021-09-01 PROCEDURE — 80053 COMPREHEN METABOLIC PANEL: CPT | Performed by: STUDENT IN AN ORGANIZED HEALTH CARE EDUCATION/TRAINING PROGRAM

## 2021-09-01 PROCEDURE — 63600175 PHARM REV CODE 636 W HCPCS: Performed by: INTERNAL MEDICINE

## 2021-09-01 PROCEDURE — 25000003 PHARM REV CODE 250: Performed by: STUDENT IN AN ORGANIZED HEALTH CARE EDUCATION/TRAINING PROGRAM

## 2021-09-01 RX ADMIN — PANTOPRAZOLE SODIUM 40 MG: 40 TABLET, DELAYED RELEASE ORAL at 08:09

## 2021-09-01 RX ADMIN — SODIUM BICARBONATE 650 MG TABLET 1300 MG: at 08:09

## 2021-09-01 RX ADMIN — GABAPENTIN 100 MG: 100 CAPSULE ORAL at 08:09

## 2021-09-01 RX ADMIN — LINEZOLID 600 MG: 600 INJECTION, SOLUTION INTRAVENOUS at 05:09

## 2021-09-01 RX ADMIN — APIXABAN 5 MG: 5 TABLET, FILM COATED ORAL at 08:09

## 2021-09-01 RX ADMIN — PIPERACILLIN SODIUM AND TAZOBACTAM SODIUM 4.5 G: 4; .5 INJECTION, POWDER, FOR SOLUTION INTRAVENOUS at 03:09

## 2021-09-01 RX ADMIN — PIPERACILLIN SODIUM AND TAZOBACTAM SODIUM 4.5 G: 4; .5 INJECTION, POWDER, FOR SOLUTION INTRAVENOUS at 12:09

## 2021-09-01 RX ADMIN — IOHEXOL 25 ML: 300 INJECTION, SOLUTION INTRAVENOUS at 12:09

## 2021-09-01 RX ADMIN — PIPERACILLIN SODIUM AND TAZOBACTAM SODIUM 4.5 G: 4; .5 INJECTION, POWDER, FOR SOLUTION INTRAVENOUS at 08:09

## 2021-09-02 LAB
ALBUMIN SERPL BCP-MCNC: 2.9 G/DL (ref 3.5–5.2)
ALP SERPL-CCNC: 85 U/L (ref 55–135)
ALT SERPL W/O P-5'-P-CCNC: 9 U/L (ref 10–44)
ANION GAP SERPL CALC-SCNC: 9 MMOL/L (ref 8–16)
AST SERPL-CCNC: 13 U/L (ref 10–40)
BASOPHILS # BLD AUTO: 0.04 K/UL (ref 0–0.2)
BASOPHILS NFR BLD: 0.6 % (ref 0–1.9)
BILIRUB SERPL-MCNC: 0.5 MG/DL (ref 0.1–1)
BUN SERPL-MCNC: 13 MG/DL (ref 6–20)
CALCIUM SERPL-MCNC: 9 MG/DL (ref 8.7–10.5)
CHLORIDE SERPL-SCNC: 109 MMOL/L (ref 95–110)
CO2 SERPL-SCNC: 21 MMOL/L (ref 23–29)
CREAT SERPL-MCNC: 1.4 MG/DL (ref 0.5–1.4)
DIFFERENTIAL METHOD: ABNORMAL
EOSINOPHIL # BLD AUTO: 0.1 K/UL (ref 0–0.5)
EOSINOPHIL NFR BLD: 1.9 % (ref 0–8)
ERYTHROCYTE [DISTWIDTH] IN BLOOD BY AUTOMATED COUNT: 13.7 % (ref 11.5–14.5)
EST. GFR  (AFRICAN AMERICAN): 47.8 ML/MIN/1.73 M^2
EST. GFR  (NON AFRICAN AMERICAN): 41.4 ML/MIN/1.73 M^2
GLUCOSE SERPL-MCNC: 79 MG/DL (ref 70–110)
HCT VFR BLD AUTO: 33.4 % (ref 37–48.5)
HGB BLD-MCNC: 10.4 G/DL (ref 12–16)
IMM GRANULOCYTES # BLD AUTO: 0.02 K/UL (ref 0–0.04)
IMM GRANULOCYTES NFR BLD AUTO: 0.3 % (ref 0–0.5)
LYMPHOCYTES # BLD AUTO: 1.1 K/UL (ref 1–4.8)
LYMPHOCYTES NFR BLD: 15.2 % (ref 18–48)
MAGNESIUM SERPL-MCNC: 1.5 MG/DL (ref 1.6–2.6)
MCH RBC QN AUTO: 27.3 PG (ref 27–31)
MCHC RBC AUTO-ENTMCNC: 31.1 G/DL (ref 32–36)
MCV RBC AUTO: 88 FL (ref 82–98)
MONOCYTES # BLD AUTO: 1 K/UL (ref 0.3–1)
MONOCYTES NFR BLD: 13.7 % (ref 4–15)
NEUTROPHILS # BLD AUTO: 4.9 K/UL (ref 1.8–7.7)
NEUTROPHILS NFR BLD: 68.3 % (ref 38–73)
NRBC BLD-RTO: 0 /100 WBC
PLATELET # BLD AUTO: 233 K/UL (ref 150–450)
PMV BLD AUTO: 10.3 FL (ref 9.2–12.9)
POTASSIUM SERPL-SCNC: 3.5 MMOL/L (ref 3.5–5.1)
PROT SERPL-MCNC: 6.4 G/DL (ref 6–8.4)
RBC # BLD AUTO: 3.81 M/UL (ref 4–5.4)
SODIUM SERPL-SCNC: 139 MMOL/L (ref 136–145)
WBC # BLD AUTO: 7.22 K/UL (ref 3.9–12.7)

## 2021-09-02 PROCEDURE — 25000003 PHARM REV CODE 250: Performed by: INTERNAL MEDICINE

## 2021-09-02 PROCEDURE — 36415 COLL VENOUS BLD VENIPUNCTURE: CPT | Performed by: STUDENT IN AN ORGANIZED HEALTH CARE EDUCATION/TRAINING PROGRAM

## 2021-09-02 PROCEDURE — 63600175 PHARM REV CODE 636 W HCPCS: Performed by: STUDENT IN AN ORGANIZED HEALTH CARE EDUCATION/TRAINING PROGRAM

## 2021-09-02 PROCEDURE — 63600175 PHARM REV CODE 636 W HCPCS: Performed by: INTERNAL MEDICINE

## 2021-09-02 PROCEDURE — 20600001 HC STEP DOWN PRIVATE ROOM

## 2021-09-02 PROCEDURE — 85025 COMPLETE CBC W/AUTO DIFF WBC: CPT | Performed by: STUDENT IN AN ORGANIZED HEALTH CARE EDUCATION/TRAINING PROGRAM

## 2021-09-02 PROCEDURE — 25000003 PHARM REV CODE 250: Performed by: STUDENT IN AN ORGANIZED HEALTH CARE EDUCATION/TRAINING PROGRAM

## 2021-09-02 PROCEDURE — 99223 PR INITIAL HOSPITAL CARE,LEVL III: ICD-10-PCS | Mod: 95,,, | Performed by: PHYSICIAN ASSISTANT

## 2021-09-02 PROCEDURE — 80053 COMPREHEN METABOLIC PANEL: CPT | Performed by: STUDENT IN AN ORGANIZED HEALTH CARE EDUCATION/TRAINING PROGRAM

## 2021-09-02 PROCEDURE — 99233 PR SUBSEQUENT HOSPITAL CARE,LEVL III: ICD-10-PCS | Mod: ,,, | Performed by: INTERNAL MEDICINE

## 2021-09-02 PROCEDURE — 83735 ASSAY OF MAGNESIUM: CPT | Performed by: STUDENT IN AN ORGANIZED HEALTH CARE EDUCATION/TRAINING PROGRAM

## 2021-09-02 PROCEDURE — 99223 1ST HOSP IP/OBS HIGH 75: CPT | Mod: 95,,, | Performed by: PHYSICIAN ASSISTANT

## 2021-09-02 PROCEDURE — 99233 SBSQ HOSP IP/OBS HIGH 50: CPT | Mod: ,,, | Performed by: INTERNAL MEDICINE

## 2021-09-02 RX ORDER — LANOLIN ALCOHOL/MO/W.PET/CERES
400 CREAM (GRAM) TOPICAL ONCE
Status: COMPLETED | OUTPATIENT
Start: 2021-09-02 | End: 2021-09-02

## 2021-09-02 RX ORDER — MAGNESIUM SULFATE HEPTAHYDRATE 40 MG/ML
2 INJECTION, SOLUTION INTRAVENOUS
Status: COMPLETED | OUTPATIENT
Start: 2021-09-02 | End: 2021-09-02

## 2021-09-02 RX ORDER — CEFEPIME HYDROCHLORIDE 2 G/1
2 INJECTION, POWDER, FOR SOLUTION INTRAVENOUS
Status: DISCONTINUED | OUTPATIENT
Start: 2021-09-02 | End: 2021-09-03

## 2021-09-02 RX ADMIN — Medication 400 MG: at 08:09

## 2021-09-02 RX ADMIN — PANTOPRAZOLE SODIUM 40 MG: 40 TABLET, DELAYED RELEASE ORAL at 08:09

## 2021-09-02 RX ADMIN — CEFEPIME 2 G: 2 INJECTION, POWDER, FOR SOLUTION INTRAVENOUS at 05:09

## 2021-09-02 RX ADMIN — SODIUM BICARBONATE 650 MG TABLET 1300 MG: at 08:09

## 2021-09-02 RX ADMIN — MAGNESIUM SULFATE 2 G: 2 INJECTION INTRAVENOUS at 12:09

## 2021-09-02 RX ADMIN — PIPERACILLIN SODIUM AND TAZOBACTAM SODIUM 4.5 G: 4; .5 INJECTION, POWDER, FOR SOLUTION INTRAVENOUS at 03:09

## 2021-09-02 RX ADMIN — MAGNESIUM SULFATE 2 G: 2 INJECTION INTRAVENOUS at 02:09

## 2021-09-02 RX ADMIN — APIXABAN 5 MG: 5 TABLET, FILM COATED ORAL at 08:09

## 2021-09-02 RX ADMIN — LINEZOLID 600 MG: 600 INJECTION, SOLUTION INTRAVENOUS at 04:09

## 2021-09-02 RX ADMIN — PIPERACILLIN SODIUM AND TAZOBACTAM SODIUM 4.5 G: 4; .5 INJECTION, POWDER, FOR SOLUTION INTRAVENOUS at 11:09

## 2021-09-02 RX ADMIN — GABAPENTIN 100 MG: 100 CAPSULE ORAL at 08:09

## 2021-09-03 PROBLEM — A41.9 SEVERE SEPSIS: Status: RESOLVED | Noted: 2021-04-16 | Resolved: 2021-09-03

## 2021-09-03 PROBLEM — R65.20 SEVERE SEPSIS: Status: RESOLVED | Noted: 2021-04-16 | Resolved: 2021-09-03

## 2021-09-03 PROBLEM — I95.9 HYPOTENSION: Status: RESOLVED | Noted: 2021-08-31 | Resolved: 2021-09-03

## 2021-09-03 LAB
ALBUMIN SERPL BCP-MCNC: 2.6 G/DL (ref 3.5–5.2)
ALP SERPL-CCNC: 87 U/L (ref 55–135)
ALT SERPL W/O P-5'-P-CCNC: 10 U/L (ref 10–44)
ANION GAP SERPL CALC-SCNC: 8 MMOL/L (ref 8–16)
AST SERPL-CCNC: 11 U/L (ref 10–40)
BASOPHILS # BLD AUTO: 0.04 K/UL (ref 0–0.2)
BASOPHILS NFR BLD: 0.7 % (ref 0–1.9)
BILIRUB SERPL-MCNC: 0.3 MG/DL (ref 0.1–1)
BUN SERPL-MCNC: 11 MG/DL (ref 6–20)
CALCIUM SERPL-MCNC: 9 MG/DL (ref 8.7–10.5)
CHLORIDE SERPL-SCNC: 111 MMOL/L (ref 95–110)
CK SERPL-CCNC: 68 U/L (ref 20–180)
CO2 SERPL-SCNC: 20 MMOL/L (ref 23–29)
CREAT SERPL-MCNC: 1.2 MG/DL (ref 0.5–1.4)
DIFFERENTIAL METHOD: ABNORMAL
EOSINOPHIL # BLD AUTO: 0.2 K/UL (ref 0–0.5)
EOSINOPHIL NFR BLD: 3.6 % (ref 0–8)
ERYTHROCYTE [DISTWIDTH] IN BLOOD BY AUTOMATED COUNT: 13.8 % (ref 11.5–14.5)
EST. GFR  (AFRICAN AMERICAN): 57.6 ML/MIN/1.73 M^2
EST. GFR  (NON AFRICAN AMERICAN): 49.9 ML/MIN/1.73 M^2
GLUCOSE SERPL-MCNC: 81 MG/DL (ref 70–110)
HCT VFR BLD AUTO: 32.7 % (ref 37–48.5)
HGB BLD-MCNC: 10.1 G/DL (ref 12–16)
IMM GRANULOCYTES # BLD AUTO: 0.01 K/UL (ref 0–0.04)
IMM GRANULOCYTES NFR BLD AUTO: 0.2 % (ref 0–0.5)
LYMPHOCYTES # BLD AUTO: 0.9 K/UL (ref 1–4.8)
LYMPHOCYTES NFR BLD: 16.3 % (ref 18–48)
MAGNESIUM SERPL-MCNC: 2.1 MG/DL (ref 1.6–2.6)
MCH RBC QN AUTO: 27.5 PG (ref 27–31)
MCHC RBC AUTO-ENTMCNC: 30.9 G/DL (ref 32–36)
MCV RBC AUTO: 89 FL (ref 82–98)
MONOCYTES # BLD AUTO: 0.9 K/UL (ref 0.3–1)
MONOCYTES NFR BLD: 16.6 % (ref 4–15)
NEUTROPHILS # BLD AUTO: 3.5 K/UL (ref 1.8–7.7)
NEUTROPHILS NFR BLD: 62.6 % (ref 38–73)
NRBC BLD-RTO: 0 /100 WBC
PLATELET # BLD AUTO: 197 K/UL (ref 150–450)
PMV BLD AUTO: 10.3 FL (ref 9.2–12.9)
POTASSIUM SERPL-SCNC: 3.7 MMOL/L (ref 3.5–5.1)
PROT SERPL-MCNC: 6.1 G/DL (ref 6–8.4)
RBC # BLD AUTO: 3.67 M/UL (ref 4–5.4)
SODIUM SERPL-SCNC: 139 MMOL/L (ref 136–145)
WBC # BLD AUTO: 5.53 K/UL (ref 3.9–12.7)

## 2021-09-03 PROCEDURE — 82550 ASSAY OF CK (CPK): CPT | Performed by: STUDENT IN AN ORGANIZED HEALTH CARE EDUCATION/TRAINING PROGRAM

## 2021-09-03 PROCEDURE — 99233 SBSQ HOSP IP/OBS HIGH 50: CPT | Mod: ,,, | Performed by: INTERNAL MEDICINE

## 2021-09-03 PROCEDURE — 20600001 HC STEP DOWN PRIVATE ROOM

## 2021-09-03 PROCEDURE — 85025 COMPLETE CBC W/AUTO DIFF WBC: CPT | Performed by: STUDENT IN AN ORGANIZED HEALTH CARE EDUCATION/TRAINING PROGRAM

## 2021-09-03 PROCEDURE — 25000003 PHARM REV CODE 250: Performed by: STUDENT IN AN ORGANIZED HEALTH CARE EDUCATION/TRAINING PROGRAM

## 2021-09-03 PROCEDURE — 36415 COLL VENOUS BLD VENIPUNCTURE: CPT | Performed by: STUDENT IN AN ORGANIZED HEALTH CARE EDUCATION/TRAINING PROGRAM

## 2021-09-03 PROCEDURE — 63600175 PHARM REV CODE 636 W HCPCS: Performed by: STUDENT IN AN ORGANIZED HEALTH CARE EDUCATION/TRAINING PROGRAM

## 2021-09-03 PROCEDURE — 99233 PR SUBSEQUENT HOSPITAL CARE,LEVL III: ICD-10-PCS | Mod: ,,, | Performed by: INTERNAL MEDICINE

## 2021-09-03 PROCEDURE — 83735 ASSAY OF MAGNESIUM: CPT | Performed by: STUDENT IN AN ORGANIZED HEALTH CARE EDUCATION/TRAINING PROGRAM

## 2021-09-03 PROCEDURE — 80053 COMPREHEN METABOLIC PANEL: CPT | Performed by: STUDENT IN AN ORGANIZED HEALTH CARE EDUCATION/TRAINING PROGRAM

## 2021-09-03 RX ORDER — CIPROFLOXACIN 500 MG/1
500 TABLET ORAL EVERY 12 HOURS
Qty: 20 TABLET | Refills: 0 | Status: ON HOLD | OUTPATIENT
Start: 2021-09-03 | End: 2021-09-17 | Stop reason: HOSPADM

## 2021-09-03 RX ORDER — CIPROFLOXACIN 750 MG/1
750 TABLET, FILM COATED ORAL EVERY 12 HOURS
Status: DISCONTINUED | OUTPATIENT
Start: 2021-09-03 | End: 2021-09-03

## 2021-09-03 RX ORDER — CIPROFLOXACIN 500 MG/1
500 TABLET ORAL EVERY 12 HOURS
Status: DISCONTINUED | OUTPATIENT
Start: 2021-09-03 | End: 2021-09-06 | Stop reason: HOSPADM

## 2021-09-03 RX ADMIN — PANTOPRAZOLE SODIUM 40 MG: 40 TABLET, DELAYED RELEASE ORAL at 07:09

## 2021-09-03 RX ADMIN — GABAPENTIN 100 MG: 100 CAPSULE ORAL at 08:09

## 2021-09-03 RX ADMIN — SODIUM BICARBONATE 650 MG TABLET 1300 MG: at 08:09

## 2021-09-03 RX ADMIN — CIPROFLOXACIN HYDROCHLORIDE 500 MG: 500 TABLET, FILM COATED ORAL at 08:09

## 2021-09-03 RX ADMIN — APIXABAN 5 MG: 5 TABLET, FILM COATED ORAL at 08:09

## 2021-09-03 RX ADMIN — APIXABAN 5 MG: 5 TABLET, FILM COATED ORAL at 07:09

## 2021-09-03 RX ADMIN — SODIUM BICARBONATE 650 MG TABLET 1300 MG: at 07:09

## 2021-09-03 RX ADMIN — OXYCODONE 5 MG: 5 TABLET ORAL at 08:09

## 2021-09-03 RX ADMIN — CEFEPIME 2 G: 2 INJECTION, POWDER, FOR SOLUTION INTRAVENOUS at 06:09

## 2021-09-04 DIAGNOSIS — N13.30 HYDRONEPHROSIS, UNSPECIFIED HYDRONEPHROSIS TYPE: Primary | ICD-10-CM

## 2021-09-04 LAB
ALBUMIN SERPL BCP-MCNC: 2.8 G/DL (ref 3.5–5.2)
ALP SERPL-CCNC: 89 U/L (ref 55–135)
ALT SERPL W/O P-5'-P-CCNC: 9 U/L (ref 10–44)
ANION GAP SERPL CALC-SCNC: 7 MMOL/L (ref 8–16)
AST SERPL-CCNC: 11 U/L (ref 10–40)
BASOPHILS # BLD AUTO: 0.03 K/UL (ref 0–0.2)
BASOPHILS NFR BLD: 0.6 % (ref 0–1.9)
BILIRUB SERPL-MCNC: 0.1 MG/DL (ref 0.1–1)
BUN SERPL-MCNC: 13 MG/DL (ref 6–20)
CALCIUM SERPL-MCNC: 9.6 MG/DL (ref 8.7–10.5)
CHLORIDE SERPL-SCNC: 110 MMOL/L (ref 95–110)
CO2 SERPL-SCNC: 22 MMOL/L (ref 23–29)
CREAT SERPL-MCNC: 1.3 MG/DL (ref 0.5–1.4)
DIFFERENTIAL METHOD: ABNORMAL
EOSINOPHIL # BLD AUTO: 0.2 K/UL (ref 0–0.5)
EOSINOPHIL NFR BLD: 3.5 % (ref 0–8)
ERYTHROCYTE [DISTWIDTH] IN BLOOD BY AUTOMATED COUNT: 14 % (ref 11.5–14.5)
EST. GFR  (AFRICAN AMERICAN): 52.3 ML/MIN/1.73 M^2
EST. GFR  (NON AFRICAN AMERICAN): 45.3 ML/MIN/1.73 M^2
GLUCOSE SERPL-MCNC: 80 MG/DL (ref 70–110)
HCT VFR BLD AUTO: 34.1 % (ref 37–48.5)
HGB BLD-MCNC: 10.4 G/DL (ref 12–16)
IMM GRANULOCYTES # BLD AUTO: 0.01 K/UL (ref 0–0.04)
IMM GRANULOCYTES NFR BLD AUTO: 0.2 % (ref 0–0.5)
LYMPHOCYTES # BLD AUTO: 1.4 K/UL (ref 1–4.8)
LYMPHOCYTES NFR BLD: 27.1 % (ref 18–48)
MAGNESIUM SERPL-MCNC: 1.8 MG/DL (ref 1.6–2.6)
MCH RBC QN AUTO: 27.4 PG (ref 27–31)
MCHC RBC AUTO-ENTMCNC: 30.5 G/DL (ref 32–36)
MCV RBC AUTO: 90 FL (ref 82–98)
MONOCYTES # BLD AUTO: 0.8 K/UL (ref 0.3–1)
MONOCYTES NFR BLD: 15.4 % (ref 4–15)
NEUTROPHILS # BLD AUTO: 2.7 K/UL (ref 1.8–7.7)
NEUTROPHILS NFR BLD: 53.2 % (ref 38–73)
NRBC BLD-RTO: 0 /100 WBC
PLATELET # BLD AUTO: 207 K/UL (ref 150–450)
PMV BLD AUTO: 10.5 FL (ref 9.2–12.9)
POTASSIUM SERPL-SCNC: 4.2 MMOL/L (ref 3.5–5.1)
PROT SERPL-MCNC: 6.7 G/DL (ref 6–8.4)
RBC # BLD AUTO: 3.8 M/UL (ref 4–5.4)
SODIUM SERPL-SCNC: 139 MMOL/L (ref 136–145)
WBC # BLD AUTO: 5.13 K/UL (ref 3.9–12.7)

## 2021-09-04 PROCEDURE — 36415 COLL VENOUS BLD VENIPUNCTURE: CPT | Performed by: STUDENT IN AN ORGANIZED HEALTH CARE EDUCATION/TRAINING PROGRAM

## 2021-09-04 PROCEDURE — 20600001 HC STEP DOWN PRIVATE ROOM

## 2021-09-04 PROCEDURE — 99232 PR SUBSEQUENT HOSPITAL CARE,LEVL II: ICD-10-PCS | Mod: ,,, | Performed by: INTERNAL MEDICINE

## 2021-09-04 PROCEDURE — 80053 COMPREHEN METABOLIC PANEL: CPT | Performed by: STUDENT IN AN ORGANIZED HEALTH CARE EDUCATION/TRAINING PROGRAM

## 2021-09-04 PROCEDURE — 85025 COMPLETE CBC W/AUTO DIFF WBC: CPT | Performed by: STUDENT IN AN ORGANIZED HEALTH CARE EDUCATION/TRAINING PROGRAM

## 2021-09-04 PROCEDURE — 25000003 PHARM REV CODE 250: Performed by: STUDENT IN AN ORGANIZED HEALTH CARE EDUCATION/TRAINING PROGRAM

## 2021-09-04 PROCEDURE — 99232 SBSQ HOSP IP/OBS MODERATE 35: CPT | Mod: ,,, | Performed by: INTERNAL MEDICINE

## 2021-09-04 PROCEDURE — 83735 ASSAY OF MAGNESIUM: CPT | Performed by: STUDENT IN AN ORGANIZED HEALTH CARE EDUCATION/TRAINING PROGRAM

## 2021-09-04 RX ADMIN — CIPROFLOXACIN HYDROCHLORIDE 500 MG: 500 TABLET, FILM COATED ORAL at 09:09

## 2021-09-04 RX ADMIN — PANTOPRAZOLE SODIUM 40 MG: 40 TABLET, DELAYED RELEASE ORAL at 09:09

## 2021-09-04 RX ADMIN — SODIUM BICARBONATE 650 MG TABLET 1300 MG: at 09:09

## 2021-09-04 RX ADMIN — APIXABAN 5 MG: 5 TABLET, FILM COATED ORAL at 09:09

## 2021-09-04 RX ADMIN — GABAPENTIN 100 MG: 100 CAPSULE ORAL at 09:09

## 2021-09-04 RX ADMIN — OXYCODONE 5 MG: 5 TABLET ORAL at 06:09

## 2021-09-05 LAB
ALBUMIN SERPL BCP-MCNC: 2.8 G/DL (ref 3.5–5.2)
ALP SERPL-CCNC: 86 U/L (ref 55–135)
ALT SERPL W/O P-5'-P-CCNC: 9 U/L (ref 10–44)
ANION GAP SERPL CALC-SCNC: 8 MMOL/L (ref 8–16)
AST SERPL-CCNC: 10 U/L (ref 10–40)
BACTERIA BLD CULT: NORMAL
BACTERIA BLD CULT: NORMAL
BASOPHILS # BLD AUTO: 0.04 K/UL (ref 0–0.2)
BASOPHILS NFR BLD: 0.8 % (ref 0–1.9)
BILIRUB SERPL-MCNC: 0.2 MG/DL (ref 0.1–1)
BUN SERPL-MCNC: 17 MG/DL (ref 6–20)
CALCIUM SERPL-MCNC: 9.5 MG/DL (ref 8.7–10.5)
CHLORIDE SERPL-SCNC: 110 MMOL/L (ref 95–110)
CO2 SERPL-SCNC: 21 MMOL/L (ref 23–29)
CREAT SERPL-MCNC: 1.2 MG/DL (ref 0.5–1.4)
DIFFERENTIAL METHOD: ABNORMAL
EOSINOPHIL # BLD AUTO: 0.2 K/UL (ref 0–0.5)
EOSINOPHIL NFR BLD: 3.1 % (ref 0–8)
ERYTHROCYTE [DISTWIDTH] IN BLOOD BY AUTOMATED COUNT: 13.9 % (ref 11.5–14.5)
EST. GFR  (AFRICAN AMERICAN): 57.6 ML/MIN/1.73 M^2
EST. GFR  (NON AFRICAN AMERICAN): 49.9 ML/MIN/1.73 M^2
GLUCOSE SERPL-MCNC: 86 MG/DL (ref 70–110)
HCT VFR BLD AUTO: 33.4 % (ref 37–48.5)
HGB BLD-MCNC: 10.2 G/DL (ref 12–16)
IMM GRANULOCYTES # BLD AUTO: 0 K/UL (ref 0–0.04)
IMM GRANULOCYTES NFR BLD AUTO: 0 % (ref 0–0.5)
LYMPHOCYTES # BLD AUTO: 1.6 K/UL (ref 1–4.8)
LYMPHOCYTES NFR BLD: 30 % (ref 18–48)
MAGNESIUM SERPL-MCNC: 1.6 MG/DL (ref 1.6–2.6)
MCH RBC QN AUTO: 27.1 PG (ref 27–31)
MCHC RBC AUTO-ENTMCNC: 30.5 G/DL (ref 32–36)
MCV RBC AUTO: 89 FL (ref 82–98)
MONOCYTES # BLD AUTO: 0.7 K/UL (ref 0.3–1)
MONOCYTES NFR BLD: 13.2 % (ref 4–15)
NEUTROPHILS # BLD AUTO: 2.8 K/UL (ref 1.8–7.7)
NEUTROPHILS NFR BLD: 52.9 % (ref 38–73)
NRBC BLD-RTO: 0 /100 WBC
PLATELET # BLD AUTO: 226 K/UL (ref 150–450)
PMV BLD AUTO: 10.2 FL (ref 9.2–12.9)
POTASSIUM SERPL-SCNC: 4.5 MMOL/L (ref 3.5–5.1)
PROT SERPL-MCNC: 6.5 G/DL (ref 6–8.4)
RBC # BLD AUTO: 3.76 M/UL (ref 4–5.4)
SODIUM SERPL-SCNC: 139 MMOL/L (ref 136–145)
WBC # BLD AUTO: 5.23 K/UL (ref 3.9–12.7)

## 2021-09-05 PROCEDURE — 83735 ASSAY OF MAGNESIUM: CPT | Performed by: STUDENT IN AN ORGANIZED HEALTH CARE EDUCATION/TRAINING PROGRAM

## 2021-09-05 PROCEDURE — 99232 SBSQ HOSP IP/OBS MODERATE 35: CPT | Mod: ,,, | Performed by: INTERNAL MEDICINE

## 2021-09-05 PROCEDURE — 80053 COMPREHEN METABOLIC PANEL: CPT | Performed by: STUDENT IN AN ORGANIZED HEALTH CARE EDUCATION/TRAINING PROGRAM

## 2021-09-05 PROCEDURE — 25000003 PHARM REV CODE 250: Performed by: STUDENT IN AN ORGANIZED HEALTH CARE EDUCATION/TRAINING PROGRAM

## 2021-09-05 PROCEDURE — 85025 COMPLETE CBC W/AUTO DIFF WBC: CPT | Performed by: STUDENT IN AN ORGANIZED HEALTH CARE EDUCATION/TRAINING PROGRAM

## 2021-09-05 PROCEDURE — 36415 COLL VENOUS BLD VENIPUNCTURE: CPT | Performed by: STUDENT IN AN ORGANIZED HEALTH CARE EDUCATION/TRAINING PROGRAM

## 2021-09-05 PROCEDURE — 99232 PR SUBSEQUENT HOSPITAL CARE,LEVL II: ICD-10-PCS | Mod: ,,, | Performed by: INTERNAL MEDICINE

## 2021-09-05 PROCEDURE — 20600001 HC STEP DOWN PRIVATE ROOM

## 2021-09-05 RX ADMIN — PANTOPRAZOLE SODIUM 40 MG: 40 TABLET, DELAYED RELEASE ORAL at 09:09

## 2021-09-05 RX ADMIN — CIPROFLOXACIN HYDROCHLORIDE 500 MG: 500 TABLET, FILM COATED ORAL at 09:09

## 2021-09-05 RX ADMIN — GABAPENTIN 100 MG: 100 CAPSULE ORAL at 09:09

## 2021-09-05 RX ADMIN — MELATONIN TAB 3 MG 6 MG: 3 TAB at 09:09

## 2021-09-05 RX ADMIN — SODIUM BICARBONATE 650 MG TABLET 1300 MG: at 09:09

## 2021-09-05 RX ADMIN — OXYCODONE 5 MG: 5 TABLET ORAL at 12:09

## 2021-09-05 RX ADMIN — APIXABAN 5 MG: 5 TABLET, FILM COATED ORAL at 09:09

## 2021-09-05 RX ADMIN — OXYCODONE 5 MG: 5 TABLET ORAL at 09:09

## 2021-09-06 VITALS
HEART RATE: 85 BPM | RESPIRATION RATE: 14 BRPM | SYSTOLIC BLOOD PRESSURE: 100 MMHG | HEIGHT: 69 IN | TEMPERATURE: 98 F | WEIGHT: 152.31 LBS | OXYGEN SATURATION: 99 % | BODY MASS INDEX: 22.56 KG/M2 | DIASTOLIC BLOOD PRESSURE: 66 MMHG

## 2021-09-06 PROCEDURE — 94761 N-INVAS EAR/PLS OXIMETRY MLT: CPT

## 2021-09-06 PROCEDURE — 25000003 PHARM REV CODE 250: Performed by: STUDENT IN AN ORGANIZED HEALTH CARE EDUCATION/TRAINING PROGRAM

## 2021-09-06 PROCEDURE — 99239 PR HOSPITAL DISCHARGE DAY,>30 MIN: ICD-10-PCS | Mod: ,,, | Performed by: INTERNAL MEDICINE

## 2021-09-06 PROCEDURE — 99239 HOSP IP/OBS DSCHRG MGMT >30: CPT | Mod: ,,, | Performed by: INTERNAL MEDICINE

## 2021-09-06 PROCEDURE — 63600175 PHARM REV CODE 636 W HCPCS: Performed by: INTERNAL MEDICINE

## 2021-09-06 RX ORDER — ERGOCALCIFEROL 1.25 MG/1
50000 CAPSULE ORAL
Qty: 4 CAPSULE | Refills: 0 | Status: SHIPPED | OUTPATIENT
Start: 2021-09-06 | End: 2022-09-06

## 2021-09-06 RX ORDER — MIRTAZAPINE 15 MG/1
15 TABLET, ORALLY DISINTEGRATING ORAL NIGHTLY
Qty: 30 TABLET | Refills: 0 | Status: ON HOLD | OUTPATIENT
Start: 2021-09-06 | End: 2021-09-17 | Stop reason: SDUPTHER

## 2021-09-06 RX ORDER — SODIUM BICARBONATE 650 MG/1
1300 TABLET ORAL 2 TIMES DAILY
Qty: 60 TABLET | Refills: 0 | Status: ON HOLD | OUTPATIENT
Start: 2021-09-06 | End: 2022-02-15 | Stop reason: SDUPTHER

## 2021-09-06 RX ORDER — PANTOPRAZOLE SODIUM 40 MG/1
40 TABLET, DELAYED RELEASE ORAL DAILY
Qty: 30 TABLET | Refills: 0 | Status: ON HOLD | OUTPATIENT
Start: 2021-09-06 | End: 2022-09-01 | Stop reason: HOSPADM

## 2021-09-06 RX ORDER — ACETAMINOPHEN 325 MG/1
650 TABLET ORAL EVERY 4 HOURS PRN
Qty: 30 TABLET | Refills: 0 | Status: ON HOLD | OUTPATIENT
Start: 2021-09-06 | End: 2021-09-17 | Stop reason: HOSPADM

## 2021-09-06 RX ORDER — TALC
6 POWDER (GRAM) TOPICAL NIGHTLY PRN
Qty: 60 TABLET | Refills: 0 | Status: ON HOLD | OUTPATIENT
Start: 2021-09-06 | End: 2023-05-31

## 2021-09-06 RX ORDER — GABAPENTIN 100 MG/1
100 CAPSULE ORAL NIGHTLY
Qty: 30 CAPSULE | Refills: 0 | Status: ON HOLD | OUTPATIENT
Start: 2021-09-06 | End: 2022-02-15 | Stop reason: SDUPTHER

## 2021-09-06 RX ADMIN — SODIUM CHLORIDE, SODIUM LACTATE, POTASSIUM CHLORIDE, AND CALCIUM CHLORIDE 500 ML: .6; .31; .03; .02 INJECTION, SOLUTION INTRAVENOUS at 02:09

## 2021-09-06 RX ADMIN — SODIUM BICARBONATE 650 MG TABLET 1300 MG: at 08:09

## 2021-09-06 RX ADMIN — PANTOPRAZOLE SODIUM 40 MG: 40 TABLET, DELAYED RELEASE ORAL at 08:09

## 2021-09-06 RX ADMIN — APIXABAN 5 MG: 5 TABLET, FILM COATED ORAL at 08:09

## 2021-09-06 RX ADMIN — SODIUM CHLORIDE, SODIUM LACTATE, POTASSIUM CHLORIDE, AND CALCIUM CHLORIDE 500 ML: .6; .31; .03; .02 INJECTION, SOLUTION INTRAVENOUS at 10:09

## 2021-09-06 RX ADMIN — CIPROFLOXACIN HYDROCHLORIDE 500 MG: 500 TABLET, FILM COATED ORAL at 08:09

## 2021-09-07 ENCOUNTER — PATIENT OUTREACH (OUTPATIENT)
Dept: ADMINISTRATIVE | Facility: CLINIC | Age: 56
End: 2021-09-07

## 2021-09-13 ENCOUNTER — OFFICE VISIT (OUTPATIENT)
Dept: INFECTIOUS DISEASES | Facility: CLINIC | Age: 56
DRG: 699 | End: 2021-09-13
Payer: MEDICAID

## 2021-09-13 ENCOUNTER — HOSPITAL ENCOUNTER (INPATIENT)
Facility: HOSPITAL | Age: 56
LOS: 4 days | Discharge: HOME HEALTH | DRG: 699 | End: 2021-09-17
Attending: EMERGENCY MEDICINE | Admitting: HOSPITALIST
Payer: MEDICAID

## 2021-09-13 VITALS
TEMPERATURE: 98 F | SYSTOLIC BLOOD PRESSURE: 78 MMHG | DIASTOLIC BLOOD PRESSURE: 58 MMHG | HEART RATE: 86 BPM | WEIGHT: 157.63 LBS | BODY MASS INDEX: 23.35 KG/M2 | HEIGHT: 69 IN

## 2021-09-13 DIAGNOSIS — N39.0 URINARY TRACT INFECTION ASSOCIATED WITH NEPHROSTOMY CATHETER, SUBSEQUENT ENCOUNTER: Primary | ICD-10-CM

## 2021-09-13 DIAGNOSIS — T83.512A URINARY TRACT INFECTION ASSOCIATED WITH NEPHROSTOMY CATHETER: ICD-10-CM

## 2021-09-13 DIAGNOSIS — R07.9 CHEST PAIN: ICD-10-CM

## 2021-09-13 DIAGNOSIS — T83.512D URINARY TRACT INFECTION ASSOCIATED WITH NEPHROSTOMY CATHETER, SUBSEQUENT ENCOUNTER: Primary | ICD-10-CM

## 2021-09-13 DIAGNOSIS — R05.9 COUGH: ICD-10-CM

## 2021-09-13 DIAGNOSIS — N39.0 URINARY TRACT INFECTION ASSOCIATED WITH NEPHROSTOMY CATHETER: ICD-10-CM

## 2021-09-13 LAB
ALBUMIN SERPL BCP-MCNC: 3.5 G/DL (ref 3.5–5.2)
ALP SERPL-CCNC: 123 U/L (ref 55–135)
ALT SERPL W/O P-5'-P-CCNC: 9 U/L (ref 10–44)
ANION GAP SERPL CALC-SCNC: 10 MMOL/L (ref 8–16)
AST SERPL-CCNC: 12 U/L (ref 10–40)
BACTERIA #/AREA URNS AUTO: ABNORMAL /HPF
BASOPHILS # BLD AUTO: 0.05 K/UL (ref 0–0.2)
BASOPHILS NFR BLD: 0.6 % (ref 0–1.9)
BILIRUB SERPL-MCNC: 0.3 MG/DL (ref 0.1–1)
BILIRUB UR QL STRIP: NEGATIVE
BUN SERPL-MCNC: 26 MG/DL (ref 6–20)
BUN SERPL-MCNC: 31 MG/DL (ref 6–30)
BUN SERPL-MCNC: 40 MG/DL (ref 6–30)
CALCIUM SERPL-MCNC: 9.8 MG/DL (ref 8.7–10.5)
CHLORIDE SERPL-SCNC: 110 MMOL/L (ref 95–110)
CHLORIDE SERPL-SCNC: 110 MMOL/L (ref 95–110)
CHLORIDE SERPL-SCNC: 111 MMOL/L (ref 95–110)
CLARITY UR REFRACT.AUTO: ABNORMAL
CO2 SERPL-SCNC: 16 MMOL/L (ref 23–29)
COLOR UR AUTO: ABNORMAL
CREAT SERPL-MCNC: 1.8 MG/DL (ref 0.5–1.4)
CREAT SERPL-MCNC: 2.1 MG/DL (ref 0.5–1.4)
CREAT SERPL-MCNC: 2.1 MG/DL (ref 0.5–1.4)
CTP QC/QA: YES
DIFFERENTIAL METHOD: ABNORMAL
EOSINOPHIL # BLD AUTO: 0.1 K/UL (ref 0–0.5)
EOSINOPHIL NFR BLD: 1.8 % (ref 0–8)
ERYTHROCYTE [DISTWIDTH] IN BLOOD BY AUTOMATED COUNT: 14.6 % (ref 11.5–14.5)
EST. GFR  (AFRICAN AMERICAN): 35.3 ML/MIN/1.73 M^2
EST. GFR  (NON AFRICAN AMERICAN): 30.6 ML/MIN/1.73 M^2
GLUCOSE SERPL-MCNC: 78 MG/DL (ref 70–110)
GLUCOSE SERPL-MCNC: 86 MG/DL (ref 70–110)
GLUCOSE SERPL-MCNC: 90 MG/DL (ref 70–110)
GLUCOSE UR QL STRIP: NEGATIVE
HCT VFR BLD AUTO: 43.3 % (ref 37–48.5)
HCT VFR BLD CALC: 40 %PCV (ref 36–54)
HCT VFR BLD CALC: 45 %PCV (ref 36–54)
HGB BLD-MCNC: 13.5 G/DL (ref 12–16)
HGB UR QL STRIP: ABNORMAL
HYALINE CASTS UR QL AUTO: 0 /LPF
IMM GRANULOCYTES # BLD AUTO: 0.02 K/UL (ref 0–0.04)
IMM GRANULOCYTES NFR BLD AUTO: 0.3 % (ref 0–0.5)
KETONES UR QL STRIP: NEGATIVE
LACTATE SERPL-SCNC: 1.9 MMOL/L (ref 0.5–2.2)
LACTATE SERPL-SCNC: 2.2 MMOL/L (ref 0.5–2.2)
LEUKOCYTE ESTERASE UR QL STRIP: ABNORMAL
LYMPHOCYTES # BLD AUTO: 2 K/UL (ref 1–4.8)
LYMPHOCYTES NFR BLD: 25.3 % (ref 18–48)
MCH RBC QN AUTO: 27.6 PG (ref 27–31)
MCHC RBC AUTO-ENTMCNC: 31.2 G/DL (ref 32–36)
MCV RBC AUTO: 88 FL (ref 82–98)
MICROSCOPIC COMMENT: ABNORMAL
MONOCYTES # BLD AUTO: 0.9 K/UL (ref 0.3–1)
MONOCYTES NFR BLD: 11.8 % (ref 4–15)
NEUTROPHILS # BLD AUTO: 4.7 K/UL (ref 1.8–7.7)
NEUTROPHILS NFR BLD: 60.2 % (ref 38–73)
NITRITE UR QL STRIP: POSITIVE
NRBC BLD-RTO: 0 /100 WBC
PH UR STRIP: 6 [PH] (ref 5–8)
PLATELET # BLD AUTO: 189 K/UL (ref 150–450)
PMV BLD AUTO: 11.6 FL (ref 9.2–12.9)
POC IONIZED CALCIUM: 1.25 MMOL/L (ref 1.06–1.42)
POC IONIZED CALCIUM: 1.28 MMOL/L (ref 1.06–1.42)
POC TCO2 (MEASURED): 19 MMOL/L (ref 23–29)
POC TCO2 (MEASURED): 19 MMOL/L (ref 23–29)
POTASSIUM BLD-SCNC: 5.2 MMOL/L (ref 3.5–5.1)
POTASSIUM BLD-SCNC: 6 MMOL/L (ref 3.5–5.1)
POTASSIUM SERPL-SCNC: 4.7 MMOL/L (ref 3.5–5.1)
PROT SERPL-MCNC: 7.6 G/DL (ref 6–8.4)
PROT UR QL STRIP: ABNORMAL
RBC # BLD AUTO: 4.9 M/UL (ref 4–5.4)
RBC #/AREA URNS AUTO: 2 /HPF (ref 0–4)
SAMPLE: ABNORMAL
SAMPLE: ABNORMAL
SARS-COV-2 RDRP RESP QL NAA+PROBE: NEGATIVE
SODIUM BLD-SCNC: 136 MMOL/L (ref 136–145)
SODIUM BLD-SCNC: 138 MMOL/L (ref 136–145)
SODIUM SERPL-SCNC: 137 MMOL/L (ref 136–145)
SP GR UR STRIP: 1.02 (ref 1–1.03)
TROPONIN I SERPL DL<=0.01 NG/ML-MCNC: <0.006 NG/ML (ref 0–0.03)
URN SPEC COLLECT METH UR: ABNORMAL
WBC # BLD AUTO: 7.82 K/UL (ref 3.9–12.7)
WBC #/AREA URNS AUTO: 40 /HPF (ref 0–5)
WBC CLUMPS UR QL AUTO: ABNORMAL

## 2021-09-13 PROCEDURE — 87088 URINE BACTERIA CULTURE: CPT

## 2021-09-13 PROCEDURE — 63600175 PHARM REV CODE 636 W HCPCS: Performed by: EMERGENCY MEDICINE

## 2021-09-13 PROCEDURE — 20600001 HC STEP DOWN PRIVATE ROOM

## 2021-09-13 PROCEDURE — 87040 BLOOD CULTURE FOR BACTERIA: CPT

## 2021-09-13 PROCEDURE — U0002 COVID-19 LAB TEST NON-CDC: HCPCS

## 2021-09-13 PROCEDURE — 87186 SC STD MICRODIL/AGAR DIL: CPT

## 2021-09-13 PROCEDURE — 99213 OFFICE O/P EST LOW 20 MIN: CPT | Mod: PBBFAC,25 | Performed by: NURSE PRACTITIONER

## 2021-09-13 PROCEDURE — 81001 URINALYSIS AUTO W/SCOPE: CPT

## 2021-09-13 PROCEDURE — 87086 URINE CULTURE/COLONY COUNT: CPT

## 2021-09-13 PROCEDURE — 99999 PR PBB SHADOW E&M-EST. PATIENT-LVL III: CPT | Mod: PBBFAC,,, | Performed by: NURSE PRACTITIONER

## 2021-09-13 PROCEDURE — 99291 CRITICAL CARE FIRST HOUR: CPT | Mod: CS,,, | Performed by: EMERGENCY MEDICINE

## 2021-09-13 PROCEDURE — 83605 ASSAY OF LACTIC ACID: CPT | Performed by: EMERGENCY MEDICINE

## 2021-09-13 PROCEDURE — 99213 PR OFFICE/OUTPT VISIT, EST, LEVL III, 20-29 MIN: ICD-10-PCS | Mod: S$PBB,,, | Performed by: NURSE PRACTITIONER

## 2021-09-13 PROCEDURE — 99220 PR INITIAL OBSERVATION CARE,LEVL III: ICD-10-PCS | Mod: ,,, | Performed by: PHYSICIAN ASSISTANT

## 2021-09-13 PROCEDURE — 96374 THER/PROPH/DIAG INJ IV PUSH: CPT

## 2021-09-13 PROCEDURE — G0378 HOSPITAL OBSERVATION PER HR: HCPCS

## 2021-09-13 PROCEDURE — 96361 HYDRATE IV INFUSION ADD-ON: CPT | Mod: 59

## 2021-09-13 PROCEDURE — 93010 ELECTROCARDIOGRAM REPORT: CPT | Mod: ,,, | Performed by: INTERNAL MEDICINE

## 2021-09-13 PROCEDURE — 80047 BASIC METABLC PNL IONIZED CA: CPT

## 2021-09-13 PROCEDURE — 25000242 PHARM REV CODE 250 ALT 637 W/ HCPCS

## 2021-09-13 PROCEDURE — 25000003 PHARM REV CODE 250

## 2021-09-13 PROCEDURE — 93005 ELECTROCARDIOGRAM TRACING: CPT

## 2021-09-13 PROCEDURE — 63600175 PHARM REV CODE 636 W HCPCS

## 2021-09-13 PROCEDURE — 94640 AIRWAY INHALATION TREATMENT: CPT

## 2021-09-13 PROCEDURE — 80053 COMPREHEN METABOLIC PANEL: CPT | Performed by: EMERGENCY MEDICINE

## 2021-09-13 PROCEDURE — 99291 PR CRITICAL CARE, E/M 30-74 MINUTES: ICD-10-PCS | Mod: CS,,, | Performed by: EMERGENCY MEDICINE

## 2021-09-13 PROCEDURE — 96365 THER/PROPH/DIAG IV INF INIT: CPT | Mod: 59

## 2021-09-13 PROCEDURE — 99291 CRITICAL CARE FIRST HOUR: CPT | Mod: 25

## 2021-09-13 PROCEDURE — 87077 CULTURE AEROBIC IDENTIFY: CPT

## 2021-09-13 PROCEDURE — 99999 PR PBB SHADOW E&M-EST. PATIENT-LVL III: ICD-10-PCS | Mod: PBBFAC,,, | Performed by: NURSE PRACTITIONER

## 2021-09-13 PROCEDURE — 84484 ASSAY OF TROPONIN QUANT: CPT

## 2021-09-13 PROCEDURE — 85025 COMPLETE CBC W/AUTO DIFF WBC: CPT

## 2021-09-13 PROCEDURE — 94761 N-INVAS EAR/PLS OXIMETRY MLT: CPT

## 2021-09-13 PROCEDURE — 25000003 PHARM REV CODE 250: Performed by: PHYSICIAN ASSISTANT

## 2021-09-13 PROCEDURE — 93010 EKG 12-LEAD: ICD-10-PCS | Mod: ,,, | Performed by: INTERNAL MEDICINE

## 2021-09-13 PROCEDURE — 99220 PR INITIAL OBSERVATION CARE,LEVL III: CPT | Mod: ,,, | Performed by: PHYSICIAN ASSISTANT

## 2021-09-13 PROCEDURE — 99213 OFFICE O/P EST LOW 20 MIN: CPT | Mod: S$PBB,,, | Performed by: NURSE PRACTITIONER

## 2021-09-13 RX ORDER — PROCHLORPERAZINE EDISYLATE 5 MG/ML
5 INJECTION INTRAMUSCULAR; INTRAVENOUS EVERY 6 HOURS PRN
Status: DISCONTINUED | OUTPATIENT
Start: 2021-09-13 | End: 2021-09-17 | Stop reason: HOSPADM

## 2021-09-13 RX ORDER — ONDANSETRON 8 MG/1
8 TABLET, ORALLY DISINTEGRATING ORAL EVERY 8 HOURS PRN
Status: DISCONTINUED | OUTPATIENT
Start: 2021-09-13 | End: 2021-09-17 | Stop reason: HOSPADM

## 2021-09-13 RX ORDER — TALC
6 POWDER (GRAM) TOPICAL NIGHTLY PRN
Status: DISCONTINUED | OUTPATIENT
Start: 2021-09-13 | End: 2021-09-17 | Stop reason: HOSPADM

## 2021-09-13 RX ORDER — ACETAMINOPHEN 325 MG/1
650 TABLET ORAL EVERY 4 HOURS PRN
Status: DISCONTINUED | OUTPATIENT
Start: 2021-09-13 | End: 2021-09-17 | Stop reason: HOSPADM

## 2021-09-13 RX ORDER — GLUCAGON 1 MG
1 KIT INJECTION
Status: DISCONTINUED | OUTPATIENT
Start: 2021-09-13 | End: 2021-09-17 | Stop reason: HOSPADM

## 2021-09-13 RX ORDER — MIRTAZAPINE 15 MG/1
15 TABLET, ORALLY DISINTEGRATING ORAL NIGHTLY
Status: DISCONTINUED | OUTPATIENT
Start: 2021-09-13 | End: 2021-09-17

## 2021-09-13 RX ORDER — GABAPENTIN 100 MG/1
100 CAPSULE ORAL NIGHTLY
Status: DISCONTINUED | OUTPATIENT
Start: 2021-09-13 | End: 2021-09-17 | Stop reason: HOSPADM

## 2021-09-13 RX ORDER — IPRATROPIUM BROMIDE AND ALBUTEROL SULFATE 2.5; .5 MG/3ML; MG/3ML
3 SOLUTION RESPIRATORY (INHALATION) EVERY 4 HOURS PRN
Status: DISCONTINUED | OUTPATIENT
Start: 2021-09-13 | End: 2021-09-17 | Stop reason: HOSPADM

## 2021-09-13 RX ORDER — IBUPROFEN 200 MG
16 TABLET ORAL
Status: DISCONTINUED | OUTPATIENT
Start: 2021-09-13 | End: 2021-09-17 | Stop reason: HOSPADM

## 2021-09-13 RX ORDER — ALBUTEROL SULFATE 2.5 MG/.5ML
10 SOLUTION RESPIRATORY (INHALATION)
Status: COMPLETED | OUTPATIENT
Start: 2021-09-13 | End: 2021-09-13

## 2021-09-13 RX ORDER — IBUPROFEN 200 MG
24 TABLET ORAL
Status: DISCONTINUED | OUTPATIENT
Start: 2021-09-13 | End: 2021-09-17 | Stop reason: HOSPADM

## 2021-09-13 RX ORDER — PANTOPRAZOLE SODIUM 40 MG/1
40 TABLET, DELAYED RELEASE ORAL DAILY
Status: DISCONTINUED | OUTPATIENT
Start: 2021-09-14 | End: 2021-09-17 | Stop reason: HOSPADM

## 2021-09-13 RX ORDER — POLYETHYLENE GLYCOL 3350 17 G/17G
17 POWDER, FOR SOLUTION ORAL DAILY PRN
Status: DISCONTINUED | OUTPATIENT
Start: 2021-09-13 | End: 2021-09-17 | Stop reason: HOSPADM

## 2021-09-13 RX ORDER — SODIUM CHLORIDE 0.9 % (FLUSH) 0.9 %
10 SYRINGE (ML) INJECTION EVERY 8 HOURS PRN
Status: DISCONTINUED | OUTPATIENT
Start: 2021-09-13 | End: 2021-09-17 | Stop reason: HOSPADM

## 2021-09-13 RX ORDER — SODIUM BICARBONATE 650 MG/1
1300 TABLET ORAL 2 TIMES DAILY
Status: DISCONTINUED | OUTPATIENT
Start: 2021-09-13 | End: 2021-09-17 | Stop reason: HOSPADM

## 2021-09-13 RX ADMIN — PIPERACILLIN SODIUM AND TAZOBACTAM SODIUM 4.5 G: 4; .5 INJECTION, POWDER, FOR SOLUTION INTRAVENOUS at 04:09

## 2021-09-13 RX ADMIN — SODIUM BICARBONATE 650 MG TABLET 1300 MG: at 10:09

## 2021-09-13 RX ADMIN — ALBUTEROL SULFATE 10 MG: 2.5 SOLUTION RESPIRATORY (INHALATION) at 04:09

## 2021-09-13 RX ADMIN — MIRTAZAPINE 15 MG: 15 TABLET, ORALLY DISINTEGRATING ORAL at 10:09

## 2021-09-13 RX ADMIN — VANCOMYCIN HYDROCHLORIDE 2000 MG: 10 INJECTION, POWDER, LYOPHILIZED, FOR SOLUTION INTRAVENOUS at 06:09

## 2021-09-13 RX ADMIN — APIXABAN 5 MG: 5 TABLET, FILM COATED ORAL at 10:09

## 2021-09-13 RX ADMIN — SODIUM CHLORIDE 1000 ML: 0.9 INJECTION, SOLUTION INTRAVENOUS at 04:09

## 2021-09-13 RX ADMIN — SODIUM CHLORIDE, SODIUM LACTATE, POTASSIUM CHLORIDE, AND CALCIUM CHLORIDE 1000 ML: .6; .31; .03; .02 INJECTION, SOLUTION INTRAVENOUS at 06:09

## 2021-09-13 RX ADMIN — GABAPENTIN 100 MG: 100 CAPSULE ORAL at 10:09

## 2021-09-14 LAB
ANION GAP SERPL CALC-SCNC: 10 MMOL/L (ref 8–16)
BASOPHILS # BLD AUTO: 0.04 K/UL (ref 0–0.2)
BASOPHILS NFR BLD: 0.6 % (ref 0–1.9)
BUN SERPL-MCNC: 20 MG/DL (ref 6–20)
CALCIUM SERPL-MCNC: 9.4 MG/DL (ref 8.7–10.5)
CHLORIDE SERPL-SCNC: 112 MMOL/L (ref 95–110)
CO2 SERPL-SCNC: 18 MMOL/L (ref 23–29)
CREAT SERPL-MCNC: 1.5 MG/DL (ref 0.5–1.4)
DIFFERENTIAL METHOD: ABNORMAL
EOSINOPHIL # BLD AUTO: 0.2 K/UL (ref 0–0.5)
EOSINOPHIL NFR BLD: 2.8 % (ref 0–8)
ERYTHROCYTE [DISTWIDTH] IN BLOOD BY AUTOMATED COUNT: 14.3 % (ref 11.5–14.5)
EST. GFR  (AFRICAN AMERICAN): 44 ML/MIN/1.73 M^2
EST. GFR  (NON AFRICAN AMERICAN): 38.1 ML/MIN/1.73 M^2
GLUCOSE SERPL-MCNC: 71 MG/DL (ref 70–110)
GRAM STN SPEC: NORMAL
GRAM STN SPEC: NORMAL
HCT VFR BLD AUTO: 37.7 % (ref 37–48.5)
HGB BLD-MCNC: 11.4 G/DL (ref 12–16)
IMM GRANULOCYTES # BLD AUTO: 0.02 K/UL (ref 0–0.04)
IMM GRANULOCYTES NFR BLD AUTO: 0.3 % (ref 0–0.5)
LYMPHOCYTES # BLD AUTO: 1 K/UL (ref 1–4.8)
LYMPHOCYTES NFR BLD: 16.1 % (ref 18–48)
MAGNESIUM SERPL-MCNC: 1.8 MG/DL (ref 1.6–2.6)
MCH RBC QN AUTO: 27.5 PG (ref 27–31)
MCHC RBC AUTO-ENTMCNC: 30.2 G/DL (ref 32–36)
MCV RBC AUTO: 91 FL (ref 82–98)
MONOCYTES # BLD AUTO: 0.9 K/UL (ref 0.3–1)
MONOCYTES NFR BLD: 13.8 % (ref 4–15)
NEUTROPHILS # BLD AUTO: 4.2 K/UL (ref 1.8–7.7)
NEUTROPHILS NFR BLD: 66.4 % (ref 38–73)
NRBC BLD-RTO: 0 /100 WBC
PHOSPHATE SERPL-MCNC: 4 MG/DL (ref 2.7–4.5)
PLATELET # BLD AUTO: 208 K/UL (ref 150–450)
PMV BLD AUTO: 10.3 FL (ref 9.2–12.9)
POTASSIUM SERPL-SCNC: 4.2 MMOL/L (ref 3.5–5.1)
RBC # BLD AUTO: 4.14 M/UL (ref 4–5.4)
SODIUM SERPL-SCNC: 140 MMOL/L (ref 136–145)
WBC # BLD AUTO: 6.39 K/UL (ref 3.9–12.7)

## 2021-09-14 PROCEDURE — 83735 ASSAY OF MAGNESIUM: CPT | Performed by: PHYSICIAN ASSISTANT

## 2021-09-14 PROCEDURE — 20600001 HC STEP DOWN PRIVATE ROOM

## 2021-09-14 PROCEDURE — 99226 PR SUBSEQUENT OBSERVATION CARE,LEVEL III: ICD-10-PCS | Mod: ,,, | Performed by: HOSPITALIST

## 2021-09-14 PROCEDURE — 99226 PR SUBSEQUENT OBSERVATION CARE,LEVEL III: CPT | Mod: ,,, | Performed by: HOSPITALIST

## 2021-09-14 PROCEDURE — 96375 TX/PRO/DX INJ NEW DRUG ADDON: CPT | Performed by: EMERGENCY MEDICINE

## 2021-09-14 PROCEDURE — 96376 TX/PRO/DX INJ SAME DRUG ADON: CPT | Performed by: EMERGENCY MEDICINE

## 2021-09-14 PROCEDURE — 99222 PR INITIAL HOSPITAL CARE,LEVL II: ICD-10-PCS | Mod: ,,, | Performed by: PHYSICIAN ASSISTANT

## 2021-09-14 PROCEDURE — 99222 1ST HOSP IP/OBS MODERATE 55: CPT | Mod: ,,, | Performed by: PHYSICIAN ASSISTANT

## 2021-09-14 PROCEDURE — 80048 BASIC METABOLIC PNL TOTAL CA: CPT | Performed by: PHYSICIAN ASSISTANT

## 2021-09-14 PROCEDURE — 85025 COMPLETE CBC W/AUTO DIFF WBC: CPT | Performed by: PHYSICIAN ASSISTANT

## 2021-09-14 PROCEDURE — 84100 ASSAY OF PHOSPHORUS: CPT | Performed by: PHYSICIAN ASSISTANT

## 2021-09-14 PROCEDURE — 63600175 PHARM REV CODE 636 W HCPCS: Performed by: PHYSICIAN ASSISTANT

## 2021-09-14 PROCEDURE — 36415 COLL VENOUS BLD VENIPUNCTURE: CPT | Performed by: PHYSICIAN ASSISTANT

## 2021-09-14 PROCEDURE — 87205 SMEAR GRAM STAIN: CPT

## 2021-09-14 PROCEDURE — 25000003 PHARM REV CODE 250: Performed by: PHYSICIAN ASSISTANT

## 2021-09-14 RX ORDER — CEFEPIME HYDROCHLORIDE 1 G/1
1 INJECTION, POWDER, FOR SOLUTION INTRAMUSCULAR; INTRAVENOUS
Status: DISCONTINUED | OUTPATIENT
Start: 2021-09-14 | End: 2021-09-16

## 2021-09-14 RX ADMIN — MIRTAZAPINE 15 MG: 15 TABLET, ORALLY DISINTEGRATING ORAL at 09:09

## 2021-09-14 RX ADMIN — PIPERACILLIN SODIUM AND TAZOBACTAM SODIUM 4.5 G: 4; .5 INJECTION, POWDER, FOR SOLUTION INTRAVENOUS at 12:09

## 2021-09-14 RX ADMIN — PANTOPRAZOLE SODIUM 40 MG: 40 TABLET, DELAYED RELEASE ORAL at 09:09

## 2021-09-14 RX ADMIN — SODIUM BICARBONATE 650 MG TABLET 1300 MG: at 09:09

## 2021-09-14 RX ADMIN — APIXABAN 5 MG: 5 TABLET, FILM COATED ORAL at 09:09

## 2021-09-14 RX ADMIN — SODIUM BICARBONATE 650 MG TABLET 1300 MG: at 10:09

## 2021-09-14 RX ADMIN — CEFEPIME 1 G: 1 INJECTION, POWDER, FOR SOLUTION INTRAMUSCULAR; INTRAVENOUS at 04:09

## 2021-09-14 RX ADMIN — PIPERACILLIN SODIUM AND TAZOBACTAM SODIUM 4.5 G: 4; .5 INJECTION, POWDER, FOR SOLUTION INTRAVENOUS at 09:09

## 2021-09-14 RX ADMIN — GABAPENTIN 100 MG: 100 CAPSULE ORAL at 09:09

## 2021-09-15 LAB
ANION GAP SERPL CALC-SCNC: 9 MMOL/L (ref 8–16)
BASOPHILS # BLD AUTO: 0.04 K/UL (ref 0–0.2)
BASOPHILS NFR BLD: 0.9 % (ref 0–1.9)
BUN SERPL-MCNC: 20 MG/DL (ref 6–20)
CALCIUM SERPL-MCNC: 9.6 MG/DL (ref 8.7–10.5)
CHLORIDE SERPL-SCNC: 113 MMOL/L (ref 95–110)
CO2 SERPL-SCNC: 19 MMOL/L (ref 23–29)
CREAT SERPL-MCNC: 1.5 MG/DL (ref 0.5–1.4)
DIFFERENTIAL METHOD: ABNORMAL
EOSINOPHIL # BLD AUTO: 0.2 K/UL (ref 0–0.5)
EOSINOPHIL NFR BLD: 4.2 % (ref 0–8)
ERYTHROCYTE [DISTWIDTH] IN BLOOD BY AUTOMATED COUNT: 14.6 % (ref 11.5–14.5)
EST. GFR  (AFRICAN AMERICAN): 44 ML/MIN/1.73 M^2
EST. GFR  (NON AFRICAN AMERICAN): 38.1 ML/MIN/1.73 M^2
GLUCOSE SERPL-MCNC: 70 MG/DL (ref 70–110)
HCT VFR BLD AUTO: 38.1 % (ref 37–48.5)
HGB BLD-MCNC: 11.4 G/DL (ref 12–16)
IMM GRANULOCYTES # BLD AUTO: 0 K/UL (ref 0–0.04)
IMM GRANULOCYTES NFR BLD AUTO: 0 % (ref 0–0.5)
LYMPHOCYTES # BLD AUTO: 1 K/UL (ref 1–4.8)
LYMPHOCYTES NFR BLD: 22.7 % (ref 18–48)
MAGNESIUM SERPL-MCNC: 1.8 MG/DL (ref 1.6–2.6)
MCH RBC QN AUTO: 27.3 PG (ref 27–31)
MCHC RBC AUTO-ENTMCNC: 29.9 G/DL (ref 32–36)
MCV RBC AUTO: 91 FL (ref 82–98)
MONOCYTES # BLD AUTO: 0.7 K/UL (ref 0.3–1)
MONOCYTES NFR BLD: 16.5 % (ref 4–15)
NEUTROPHILS # BLD AUTO: 2.5 K/UL (ref 1.8–7.7)
NEUTROPHILS NFR BLD: 55.7 % (ref 38–73)
NRBC BLD-RTO: 0 /100 WBC
PHOSPHATE SERPL-MCNC: 3.3 MG/DL (ref 2.7–4.5)
PLATELET # BLD AUTO: 170 K/UL (ref 150–450)
PMV BLD AUTO: 11 FL (ref 9.2–12.9)
POTASSIUM SERPL-SCNC: 4 MMOL/L (ref 3.5–5.1)
RBC # BLD AUTO: 4.17 M/UL (ref 4–5.4)
SODIUM SERPL-SCNC: 141 MMOL/L (ref 136–145)
WBC # BLD AUTO: 4.49 K/UL (ref 3.9–12.7)

## 2021-09-15 PROCEDURE — 99233 SBSQ HOSP IP/OBS HIGH 50: CPT | Mod: ,,, | Performed by: PHYSICIAN ASSISTANT

## 2021-09-15 PROCEDURE — 84100 ASSAY OF PHOSPHORUS: CPT | Performed by: PHYSICIAN ASSISTANT

## 2021-09-15 PROCEDURE — 25000003 PHARM REV CODE 250: Performed by: PHYSICIAN ASSISTANT

## 2021-09-15 PROCEDURE — 63600175 PHARM REV CODE 636 W HCPCS: Performed by: PHYSICIAN ASSISTANT

## 2021-09-15 PROCEDURE — 85025 COMPLETE CBC W/AUTO DIFF WBC: CPT | Performed by: PHYSICIAN ASSISTANT

## 2021-09-15 PROCEDURE — 20600001 HC STEP DOWN PRIVATE ROOM

## 2021-09-15 PROCEDURE — 36415 COLL VENOUS BLD VENIPUNCTURE: CPT | Performed by: PHYSICIAN ASSISTANT

## 2021-09-15 PROCEDURE — 83735 ASSAY OF MAGNESIUM: CPT | Performed by: PHYSICIAN ASSISTANT

## 2021-09-15 PROCEDURE — 99233 PR SUBSEQUENT HOSPITAL CARE,LEVL III: ICD-10-PCS | Mod: ,,, | Performed by: PHYSICIAN ASSISTANT

## 2021-09-15 PROCEDURE — 99233 PR SUBSEQUENT HOSPITAL CARE,LEVL III: ICD-10-PCS | Mod: ,,, | Performed by: HOSPITALIST

## 2021-09-15 PROCEDURE — 99233 SBSQ HOSP IP/OBS HIGH 50: CPT | Mod: ,,, | Performed by: HOSPITALIST

## 2021-09-15 PROCEDURE — 80048 BASIC METABOLIC PNL TOTAL CA: CPT | Performed by: PHYSICIAN ASSISTANT

## 2021-09-15 RX ADMIN — APIXABAN 5 MG: 5 TABLET, FILM COATED ORAL at 09:09

## 2021-09-15 RX ADMIN — MIRTAZAPINE 15 MG: 15 TABLET, ORALLY DISINTEGRATING ORAL at 09:09

## 2021-09-15 RX ADMIN — Medication 6 MG: at 09:09

## 2021-09-15 RX ADMIN — CEFEPIME 1 G: 1 INJECTION, POWDER, FOR SOLUTION INTRAMUSCULAR; INTRAVENOUS at 05:09

## 2021-09-15 RX ADMIN — PANTOPRAZOLE SODIUM 40 MG: 40 TABLET, DELAYED RELEASE ORAL at 08:09

## 2021-09-15 RX ADMIN — SODIUM BICARBONATE 650 MG TABLET 1300 MG: at 08:09

## 2021-09-15 RX ADMIN — SODIUM BICARBONATE 650 MG TABLET 1300 MG: at 09:09

## 2021-09-15 RX ADMIN — CEFEPIME 1 G: 1 INJECTION, POWDER, FOR SOLUTION INTRAMUSCULAR; INTRAVENOUS at 03:09

## 2021-09-15 RX ADMIN — GABAPENTIN 100 MG: 100 CAPSULE ORAL at 09:09

## 2021-09-15 RX ADMIN — APIXABAN 5 MG: 5 TABLET, FILM COATED ORAL at 08:09

## 2021-09-16 LAB
ANION GAP SERPL CALC-SCNC: 9 MMOL/L (ref 8–16)
BASOPHILS # BLD AUTO: 0.04 K/UL (ref 0–0.2)
BASOPHILS NFR BLD: 0.7 % (ref 0–1.9)
BUN SERPL-MCNC: 20 MG/DL (ref 6–20)
CALCIUM SERPL-MCNC: 10 MG/DL (ref 8.7–10.5)
CHLORIDE SERPL-SCNC: 113 MMOL/L (ref 95–110)
CO2 SERPL-SCNC: 20 MMOL/L (ref 23–29)
CREAT SERPL-MCNC: 1.3 MG/DL (ref 0.5–1.4)
DIFFERENTIAL METHOD: ABNORMAL
EOSINOPHIL # BLD AUTO: 0.2 K/UL (ref 0–0.5)
EOSINOPHIL NFR BLD: 4 % (ref 0–8)
ERYTHROCYTE [DISTWIDTH] IN BLOOD BY AUTOMATED COUNT: 14.5 % (ref 11.5–14.5)
EST. GFR  (AFRICAN AMERICAN): 52.3 ML/MIN/1.73 M^2
EST. GFR  (NON AFRICAN AMERICAN): 45.3 ML/MIN/1.73 M^2
GLUCOSE SERPL-MCNC: 82 MG/DL (ref 70–110)
HCT VFR BLD AUTO: 36.4 % (ref 37–48.5)
HGB BLD-MCNC: 11.1 G/DL (ref 12–16)
IMM GRANULOCYTES # BLD AUTO: 0.01 K/UL (ref 0–0.04)
IMM GRANULOCYTES NFR BLD AUTO: 0.2 % (ref 0–0.5)
LYMPHOCYTES # BLD AUTO: 1.1 K/UL (ref 1–4.8)
LYMPHOCYTES NFR BLD: 20.3 % (ref 18–48)
MAGNESIUM SERPL-MCNC: 1.7 MG/DL (ref 1.6–2.6)
MCH RBC QN AUTO: 27.3 PG (ref 27–31)
MCHC RBC AUTO-ENTMCNC: 30.5 G/DL (ref 32–36)
MCV RBC AUTO: 90 FL (ref 82–98)
MONOCYTES # BLD AUTO: 0.9 K/UL (ref 0.3–1)
MONOCYTES NFR BLD: 15.9 % (ref 4–15)
NEUTROPHILS # BLD AUTO: 3.3 K/UL (ref 1.8–7.7)
NEUTROPHILS NFR BLD: 58.9 % (ref 38–73)
NRBC BLD-RTO: 0 /100 WBC
PHOSPHATE SERPL-MCNC: 3.6 MG/DL (ref 2.7–4.5)
PLATELET # BLD AUTO: 212 K/UL (ref 150–450)
PMV BLD AUTO: 10.1 FL (ref 9.2–12.9)
POCT GLUCOSE: 128 MG/DL (ref 70–110)
POCT GLUCOSE: 93 MG/DL (ref 70–110)
POCT GLUCOSE: 98 MG/DL (ref 70–110)
POTASSIUM SERPL-SCNC: 3.9 MMOL/L (ref 3.5–5.1)
RBC # BLD AUTO: 4.06 M/UL (ref 4–5.4)
SODIUM SERPL-SCNC: 142 MMOL/L (ref 136–145)
WBC # BLD AUTO: 5.52 K/UL (ref 3.9–12.7)

## 2021-09-16 PROCEDURE — 99232 PR SUBSEQUENT HOSPITAL CARE,LEVL II: ICD-10-PCS | Mod: ,,, | Performed by: HOSPITALIST

## 2021-09-16 PROCEDURE — 99233 PR SUBSEQUENT HOSPITAL CARE,LEVL III: ICD-10-PCS | Mod: ,,, | Performed by: PHYSICIAN ASSISTANT

## 2021-09-16 PROCEDURE — 25000003 PHARM REV CODE 250: Performed by: PHYSICIAN ASSISTANT

## 2021-09-16 PROCEDURE — 20600001 HC STEP DOWN PRIVATE ROOM

## 2021-09-16 PROCEDURE — 97165 OT EVAL LOW COMPLEX 30 MIN: CPT

## 2021-09-16 PROCEDURE — 97535 SELF CARE MNGMENT TRAINING: CPT

## 2021-09-16 PROCEDURE — 63600175 PHARM REV CODE 636 W HCPCS: Performed by: PHYSICIAN ASSISTANT

## 2021-09-16 PROCEDURE — 85025 COMPLETE CBC W/AUTO DIFF WBC: CPT | Performed by: PHYSICIAN ASSISTANT

## 2021-09-16 PROCEDURE — 80048 BASIC METABOLIC PNL TOTAL CA: CPT | Performed by: PHYSICIAN ASSISTANT

## 2021-09-16 PROCEDURE — 99232 SBSQ HOSP IP/OBS MODERATE 35: CPT | Mod: ,,, | Performed by: HOSPITALIST

## 2021-09-16 PROCEDURE — 83735 ASSAY OF MAGNESIUM: CPT | Performed by: PHYSICIAN ASSISTANT

## 2021-09-16 PROCEDURE — 84100 ASSAY OF PHOSPHORUS: CPT | Performed by: PHYSICIAN ASSISTANT

## 2021-09-16 PROCEDURE — 36415 COLL VENOUS BLD VENIPUNCTURE: CPT | Performed by: PHYSICIAN ASSISTANT

## 2021-09-16 PROCEDURE — 99233 SBSQ HOSP IP/OBS HIGH 50: CPT | Mod: ,,, | Performed by: PHYSICIAN ASSISTANT

## 2021-09-16 RX ADMIN — CEFEPIME 1 G: 1 INJECTION, POWDER, FOR SOLUTION INTRAMUSCULAR; INTRAVENOUS at 04:09

## 2021-09-16 RX ADMIN — PANTOPRAZOLE SODIUM 40 MG: 40 TABLET, DELAYED RELEASE ORAL at 09:09

## 2021-09-16 RX ADMIN — ERTAPENEM 1 G: 1 INJECTION INTRAMUSCULAR; INTRAVENOUS at 12:09

## 2021-09-16 RX ADMIN — APIXABAN 5 MG: 5 TABLET, FILM COATED ORAL at 09:09

## 2021-09-16 RX ADMIN — SODIUM BICARBONATE 650 MG TABLET 1300 MG: at 09:09

## 2021-09-16 RX ADMIN — MIRTAZAPINE 15 MG: 15 TABLET, ORALLY DISINTEGRATING ORAL at 09:09

## 2021-09-16 RX ADMIN — Medication 6 MG: at 09:09

## 2021-09-16 RX ADMIN — GABAPENTIN 100 MG: 100 CAPSULE ORAL at 09:09

## 2021-09-16 RX ADMIN — ACETAMINOPHEN 650 MG: 325 TABLET ORAL at 09:09

## 2021-09-17 ENCOUNTER — TELEPHONE (OUTPATIENT)
Dept: PHARMACY | Facility: CLINIC | Age: 56
End: 2021-09-17

## 2021-09-17 VITALS
HEART RATE: 78 BPM | HEIGHT: 69 IN | SYSTOLIC BLOOD PRESSURE: 102 MMHG | RESPIRATION RATE: 15 BRPM | OXYGEN SATURATION: 99 % | TEMPERATURE: 98 F | BODY MASS INDEX: 21.94 KG/M2 | DIASTOLIC BLOOD PRESSURE: 57 MMHG | WEIGHT: 148.13 LBS

## 2021-09-17 LAB
ANION GAP SERPL CALC-SCNC: 10 MMOL/L (ref 8–16)
BACTERIA UR CULT: ABNORMAL
BACTERIA UR CULT: ABNORMAL
BASOPHILS # BLD AUTO: 0.03 K/UL (ref 0–0.2)
BASOPHILS NFR BLD: 0.6 % (ref 0–1.9)
BUN SERPL-MCNC: 20 MG/DL (ref 6–20)
CALCIUM SERPL-MCNC: 10 MG/DL (ref 8.7–10.5)
CHLORIDE SERPL-SCNC: 112 MMOL/L (ref 95–110)
CO2 SERPL-SCNC: 20 MMOL/L (ref 23–29)
CREAT SERPL-MCNC: 1.3 MG/DL (ref 0.5–1.4)
DIFFERENTIAL METHOD: ABNORMAL
EOSINOPHIL # BLD AUTO: 0.2 K/UL (ref 0–0.5)
EOSINOPHIL NFR BLD: 4.2 % (ref 0–8)
ERYTHROCYTE [DISTWIDTH] IN BLOOD BY AUTOMATED COUNT: 14.3 % (ref 11.5–14.5)
EST. GFR  (AFRICAN AMERICAN): 52.3 ML/MIN/1.73 M^2
EST. GFR  (NON AFRICAN AMERICAN): 45.3 ML/MIN/1.73 M^2
GLUCOSE SERPL-MCNC: 77 MG/DL (ref 70–110)
HCT VFR BLD AUTO: 36.1 % (ref 37–48.5)
HGB BLD-MCNC: 10.7 G/DL (ref 12–16)
IMM GRANULOCYTES # BLD AUTO: 0.01 K/UL (ref 0–0.04)
IMM GRANULOCYTES NFR BLD AUTO: 0.2 % (ref 0–0.5)
LYMPHOCYTES # BLD AUTO: 1.3 K/UL (ref 1–4.8)
LYMPHOCYTES NFR BLD: 26.4 % (ref 18–48)
MAGNESIUM SERPL-MCNC: 1.8 MG/DL (ref 1.6–2.6)
MCH RBC QN AUTO: 26.9 PG (ref 27–31)
MCHC RBC AUTO-ENTMCNC: 29.6 G/DL (ref 32–36)
MCV RBC AUTO: 91 FL (ref 82–98)
MONOCYTES # BLD AUTO: 0.8 K/UL (ref 0.3–1)
MONOCYTES NFR BLD: 16.5 % (ref 4–15)
NEUTROPHILS # BLD AUTO: 2.6 K/UL (ref 1.8–7.7)
NEUTROPHILS NFR BLD: 52.1 % (ref 38–73)
NRBC BLD-RTO: 0 /100 WBC
PHOSPHATE SERPL-MCNC: 3.2 MG/DL (ref 2.7–4.5)
PLATELET # BLD AUTO: 219 K/UL (ref 150–450)
PMV BLD AUTO: 10.4 FL (ref 9.2–12.9)
POCT GLUCOSE: 101 MG/DL (ref 70–110)
POCT GLUCOSE: 79 MG/DL (ref 70–110)
POTASSIUM SERPL-SCNC: 4.3 MMOL/L (ref 3.5–5.1)
RBC # BLD AUTO: 3.98 M/UL (ref 4–5.4)
SODIUM SERPL-SCNC: 142 MMOL/L (ref 136–145)
WBC # BLD AUTO: 5.03 K/UL (ref 3.9–12.7)

## 2021-09-17 PROCEDURE — 84100 ASSAY OF PHOSPHORUS: CPT | Performed by: PHYSICIAN ASSISTANT

## 2021-09-17 PROCEDURE — 97161 PT EVAL LOW COMPLEX 20 MIN: CPT

## 2021-09-17 PROCEDURE — 36415 COLL VENOUS BLD VENIPUNCTURE: CPT | Performed by: PHYSICIAN ASSISTANT

## 2021-09-17 PROCEDURE — 83735 ASSAY OF MAGNESIUM: CPT | Performed by: PHYSICIAN ASSISTANT

## 2021-09-17 PROCEDURE — 97530 THERAPEUTIC ACTIVITIES: CPT

## 2021-09-17 PROCEDURE — 36410 VNPNXR 3YR/> PHY/QHP DX/THER: CPT

## 2021-09-17 PROCEDURE — 99239 PR HOSPITAL DISCHARGE DAY,>30 MIN: ICD-10-PCS | Mod: ,,, | Performed by: HOSPITALIST

## 2021-09-17 PROCEDURE — 25000003 PHARM REV CODE 250: Performed by: PHYSICIAN ASSISTANT

## 2021-09-17 PROCEDURE — 99233 PR SUBSEQUENT HOSPITAL CARE,LEVL III: ICD-10-PCS | Mod: ,,, | Performed by: PHYSICIAN ASSISTANT

## 2021-09-17 PROCEDURE — 76937 US GUIDE VASCULAR ACCESS: CPT

## 2021-09-17 PROCEDURE — 85025 COMPLETE CBC W/AUTO DIFF WBC: CPT | Performed by: PHYSICIAN ASSISTANT

## 2021-09-17 PROCEDURE — 99233 SBSQ HOSP IP/OBS HIGH 50: CPT | Mod: ,,, | Performed by: PHYSICIAN ASSISTANT

## 2021-09-17 PROCEDURE — 80048 BASIC METABOLIC PNL TOTAL CA: CPT | Performed by: PHYSICIAN ASSISTANT

## 2021-09-17 PROCEDURE — 99239 HOSP IP/OBS DSCHRG MGMT >30: CPT | Mod: ,,, | Performed by: HOSPITALIST

## 2021-09-17 PROCEDURE — C1751 CATH, INF, PER/CENT/MIDLINE: HCPCS

## 2021-09-17 RX ORDER — AMPICILLIN 500 MG/1
1000 CAPSULE ORAL 3 TIMES DAILY
Qty: 84 CAPSULE | Refills: 0 | Status: SHIPPED | OUTPATIENT
Start: 2021-09-17 | End: 2021-10-01

## 2021-09-17 RX ORDER — LINEZOLID 600 MG/1
600 TABLET, FILM COATED ORAL EVERY 12 HOURS
Qty: 6 TABLET | Refills: 0 | Status: SHIPPED | OUTPATIENT
Start: 2021-09-17 | End: 2021-09-17 | Stop reason: HOSPADM

## 2021-09-17 RX ORDER — MIRTAZAPINE 15 MG/1
15 TABLET, ORALLY DISINTEGRATING ORAL NIGHTLY
Qty: 30 TABLET | Refills: 0 | Status: ON HOLD | OUTPATIENT
Start: 2021-09-17 | End: 2022-02-15 | Stop reason: SDUPTHER

## 2021-09-17 RX ORDER — AMPICILLIN 500 MG/1
1000 CAPSULE ORAL 3 TIMES DAILY
Status: DISCONTINUED | OUTPATIENT
Start: 2021-09-17 | End: 2021-09-17 | Stop reason: HOSPADM

## 2021-09-17 RX ORDER — LINEZOLID 600 MG/1
600 TABLET, FILM COATED ORAL EVERY 12 HOURS
Status: DISCONTINUED | OUTPATIENT
Start: 2021-09-17 | End: 2021-09-17

## 2021-09-17 RX ADMIN — APIXABAN 5 MG: 5 TABLET, FILM COATED ORAL at 08:09

## 2021-09-17 RX ADMIN — PANTOPRAZOLE SODIUM 40 MG: 40 TABLET, DELAYED RELEASE ORAL at 08:09

## 2021-09-17 RX ADMIN — SODIUM BICARBONATE 650 MG TABLET 1300 MG: at 08:09

## 2021-09-18 LAB
BACTERIA BLD CULT: NORMAL
BACTERIA BLD CULT: NORMAL

## 2021-09-20 ENCOUNTER — PATIENT OUTREACH (OUTPATIENT)
Dept: ADMINISTRATIVE | Facility: CLINIC | Age: 56
End: 2021-09-20

## 2021-09-30 ENCOUNTER — OFFICE VISIT (OUTPATIENT)
Dept: INFECTIOUS DISEASES | Facility: CLINIC | Age: 56
End: 2021-09-30
Payer: MEDICAID

## 2021-09-30 ENCOUNTER — INFUSION (OUTPATIENT)
Dept: INFECTIOUS DISEASES | Facility: HOSPITAL | Age: 56
End: 2021-09-30
Attending: INTERNAL MEDICINE
Payer: MEDICAID

## 2021-09-30 VITALS
BODY MASS INDEX: 24.59 KG/M2 | WEIGHT: 166 LBS | DIASTOLIC BLOOD PRESSURE: 65 MMHG | SYSTOLIC BLOOD PRESSURE: 104 MMHG | HEART RATE: 92 BPM | HEIGHT: 69 IN | TEMPERATURE: 98 F

## 2021-09-30 DIAGNOSIS — N39.0 URINARY TRACT INFECTION ASSOCIATED WITH NEPHROSTOMY CATHETER, SUBSEQUENT ENCOUNTER: Primary | ICD-10-CM

## 2021-09-30 DIAGNOSIS — T83.512D URINARY TRACT INFECTION ASSOCIATED WITH NEPHROSTOMY CATHETER, SUBSEQUENT ENCOUNTER: Primary | ICD-10-CM

## 2021-09-30 PROCEDURE — 99999 PR PBB SHADOW E&M-EST. PATIENT-LVL III: CPT | Mod: PBBFAC,,, | Performed by: PHYSICIAN ASSISTANT

## 2021-09-30 PROCEDURE — 99999 PR PBB SHADOW E&M-EST. PATIENT-LVL III: ICD-10-PCS | Mod: PBBFAC,,, | Performed by: PHYSICIAN ASSISTANT

## 2021-09-30 PROCEDURE — 99213 OFFICE O/P EST LOW 20 MIN: CPT | Mod: S$PBB,,, | Performed by: PHYSICIAN ASSISTANT

## 2021-09-30 PROCEDURE — 99213 OFFICE O/P EST LOW 20 MIN: CPT | Mod: PBBFAC | Performed by: PHYSICIAN ASSISTANT

## 2021-09-30 PROCEDURE — 99213 PR OFFICE/OUTPT VISIT, EST, LEVL III, 20-29 MIN: ICD-10-PCS | Mod: S$PBB,,, | Performed by: PHYSICIAN ASSISTANT

## 2021-09-30 RX ORDER — ERTAPENEM 1 G/1
INJECTION, POWDER, LYOPHILIZED, FOR SOLUTION INTRAMUSCULAR; INTRAVENOUS
COMMUNITY
Start: 2021-09-23 | End: 2022-02-12 | Stop reason: ALTCHOICE

## 2021-09-30 RX ORDER — SODIUM CHLORIDE 9 MG/ML
INJECTION, SOLUTION INTRAVENOUS
Status: ON HOLD | COMMUNITY
Start: 2021-09-23 | End: 2022-02-15 | Stop reason: HOSPADM

## 2021-11-24 RX ORDER — MIDAZOLAM HYDROCHLORIDE 1 MG/ML
2 INJECTION INTRAMUSCULAR; INTRAVENOUS ONCE
Status: CANCELLED | OUTPATIENT
Start: 2021-11-24 | End: 2021-11-24

## 2021-11-24 RX ORDER — FENTANYL CITRATE 50 UG/ML
100 INJECTION, SOLUTION INTRAMUSCULAR; INTRAVENOUS ONCE
Status: CANCELLED | OUTPATIENT
Start: 2021-11-24 | End: 2021-11-24

## 2021-11-29 ENCOUNTER — TELEPHONE (OUTPATIENT)
Dept: INTERVENTIONAL RADIOLOGY/VASCULAR | Facility: HOSPITAL | Age: 56
End: 2021-11-29
Payer: MEDICAID

## 2021-11-29 ENCOUNTER — LAB VISIT (OUTPATIENT)
Dept: LAB | Facility: HOSPITAL | Age: 56
End: 2021-11-29
Payer: MEDICAID

## 2021-11-29 DIAGNOSIS — N13.30 HYDRONEPHROSIS, UNSPECIFIED HYDRONEPHROSIS TYPE: ICD-10-CM

## 2021-11-29 LAB
ALBUMIN SERPL BCP-MCNC: 3.5 G/DL (ref 3.5–5.2)
ALP SERPL-CCNC: 138 U/L (ref 55–135)
ALT SERPL W/O P-5'-P-CCNC: 11 U/L (ref 10–44)
ANION GAP SERPL CALC-SCNC: 9 MMOL/L (ref 8–16)
AST SERPL-CCNC: 16 U/L (ref 10–40)
BASOPHILS # BLD AUTO: 0.05 K/UL (ref 0–0.2)
BASOPHILS NFR BLD: 0.7 % (ref 0–1.9)
BILIRUB SERPL-MCNC: 0.4 MG/DL (ref 0.1–1)
BUN SERPL-MCNC: 24 MG/DL (ref 6–20)
CALCIUM SERPL-MCNC: 9.9 MG/DL (ref 8.7–10.5)
CHLORIDE SERPL-SCNC: 111 MMOL/L (ref 95–110)
CO2 SERPL-SCNC: 19 MMOL/L (ref 23–29)
CREAT SERPL-MCNC: 1.4 MG/DL (ref 0.5–1.4)
DIFFERENTIAL METHOD: ABNORMAL
EOSINOPHIL # BLD AUTO: 0.1 K/UL (ref 0–0.5)
EOSINOPHIL NFR BLD: 2.1 % (ref 0–8)
ERYTHROCYTE [DISTWIDTH] IN BLOOD BY AUTOMATED COUNT: 14.4 % (ref 11.5–14.5)
EST. GFR  (AFRICAN AMERICAN): 47.8 ML/MIN/1.73 M^2
EST. GFR  (NON AFRICAN AMERICAN): 41.4 ML/MIN/1.73 M^2
GLUCOSE SERPL-MCNC: 102 MG/DL (ref 70–110)
HCT VFR BLD AUTO: 41.4 % (ref 37–48.5)
HGB BLD-MCNC: 12.4 G/DL (ref 12–16)
IMM GRANULOCYTES # BLD AUTO: 0.02 K/UL (ref 0–0.04)
IMM GRANULOCYTES NFR BLD AUTO: 0.3 % (ref 0–0.5)
LYMPHOCYTES # BLD AUTO: 1.9 K/UL (ref 1–4.8)
LYMPHOCYTES NFR BLD: 28.2 % (ref 18–48)
MCH RBC QN AUTO: 27.3 PG (ref 27–31)
MCHC RBC AUTO-ENTMCNC: 30 G/DL (ref 32–36)
MCV RBC AUTO: 91 FL (ref 82–98)
MONOCYTES # BLD AUTO: 0.7 K/UL (ref 0.3–1)
MONOCYTES NFR BLD: 10.3 % (ref 4–15)
NEUTROPHILS # BLD AUTO: 4 K/UL (ref 1.8–7.7)
NEUTROPHILS NFR BLD: 58.4 % (ref 38–73)
NRBC BLD-RTO: 0 /100 WBC
PLATELET # BLD AUTO: 227 K/UL (ref 150–450)
PMV BLD AUTO: 10.8 FL (ref 9.2–12.9)
POTASSIUM SERPL-SCNC: 4.3 MMOL/L (ref 3.5–5.1)
PROT SERPL-MCNC: 7.2 G/DL (ref 6–8.4)
RBC # BLD AUTO: 4.54 M/UL (ref 4–5.4)
SODIUM SERPL-SCNC: 139 MMOL/L (ref 136–145)
WBC # BLD AUTO: 6.82 K/UL (ref 3.9–12.7)

## 2021-11-29 PROCEDURE — 80053 COMPREHEN METABOLIC PANEL: CPT | Performed by: FAMILY MEDICINE

## 2021-11-29 PROCEDURE — 36415 COLL VENOUS BLD VENIPUNCTURE: CPT | Performed by: FAMILY MEDICINE

## 2021-11-29 PROCEDURE — 85025 COMPLETE CBC W/AUTO DIFF WBC: CPT | Performed by: FAMILY MEDICINE

## 2021-11-30 ENCOUNTER — TELEPHONE (OUTPATIENT)
Dept: INTERVENTIONAL RADIOLOGY/VASCULAR | Facility: HOSPITAL | Age: 56
End: 2021-11-30
Payer: MEDICAID

## 2021-12-01 ENCOUNTER — HOSPITAL ENCOUNTER (OUTPATIENT)
Dept: INTERVENTIONAL RADIOLOGY/VASCULAR | Facility: HOSPITAL | Age: 56
Discharge: HOME | End: 2021-12-01
Attending: PHYSICIAN ASSISTANT
Payer: MEDICAID

## 2021-12-01 VITALS
SYSTOLIC BLOOD PRESSURE: 99 MMHG | BODY MASS INDEX: 22.07 KG/M2 | WEIGHT: 149 LBS | HEIGHT: 69 IN | OXYGEN SATURATION: 100 % | RESPIRATION RATE: 13 BRPM | HEART RATE: 61 BPM | TEMPERATURE: 98 F | DIASTOLIC BLOOD PRESSURE: 62 MMHG

## 2021-12-01 DIAGNOSIS — N13.30 HYDRONEPHROSIS, UNSPECIFIED HYDRONEPHROSIS TYPE: Primary | ICD-10-CM

## 2021-12-01 DIAGNOSIS — N13.30 HYDRONEPHROSIS, UNSPECIFIED HYDRONEPHROSIS TYPE: ICD-10-CM

## 2021-12-01 LAB
INR PPP: 0.9 (ref 0.8–1.2)
PROTHROMBIN TIME: 10 SEC (ref 9–12.5)

## 2021-12-01 PROCEDURE — 85610 PROTHROMBIN TIME: CPT | Performed by: FAMILY MEDICINE

## 2021-12-01 PROCEDURE — 63600175 PHARM REV CODE 636 W HCPCS: Performed by: RADIOLOGY

## 2021-12-01 PROCEDURE — A4357 BEDSIDE DRAINAGE BAG: HCPCS

## 2021-12-01 PROCEDURE — 50435 IR NEPHROSTOMY TUBE CHANGE: ICD-10-PCS | Mod: 50,,, | Performed by: RADIOLOGY

## 2021-12-01 PROCEDURE — 50435 EXCHANGE NEPHROSTOMY CATH: CPT | Performed by: RADIOLOGY

## 2021-12-01 PROCEDURE — 25000003 PHARM REV CODE 250: Performed by: RADIOLOGY

## 2021-12-01 PROCEDURE — 25500020 PHARM REV CODE 255: Performed by: PHYSICIAN ASSISTANT

## 2021-12-01 RX ORDER — MIDAZOLAM HYDROCHLORIDE 1 MG/ML
INJECTION INTRAMUSCULAR; INTRAVENOUS CODE/TRAUMA/SEDATION MEDICATION
Status: COMPLETED | OUTPATIENT
Start: 2021-12-01 | End: 2021-12-01

## 2021-12-01 RX ORDER — FENTANYL CITRATE 50 UG/ML
INJECTION, SOLUTION INTRAMUSCULAR; INTRAVENOUS CODE/TRAUMA/SEDATION MEDICATION
Status: COMPLETED | OUTPATIENT
Start: 2021-12-01 | End: 2021-12-01

## 2021-12-01 RX ORDER — SODIUM CHLORIDE 9 MG/ML
INJECTION, SOLUTION INTRAVENOUS CONTINUOUS
Status: DISCONTINUED | OUTPATIENT
Start: 2021-12-01 | End: 2021-12-02 | Stop reason: HOSPADM

## 2021-12-01 RX ORDER — LIDOCAINE HYDROCHLORIDE 10 MG/ML
1 INJECTION, SOLUTION EPIDURAL; INFILTRATION; INTRACAUDAL; PERINEURAL ONCE
Status: DISCONTINUED | OUTPATIENT
Start: 2021-12-01 | End: 2021-12-02 | Stop reason: HOSPADM

## 2021-12-01 RX ORDER — LIDOCAINE HYDROCHLORIDE 10 MG/ML
INJECTION INFILTRATION; PERINEURAL CODE/TRAUMA/SEDATION MEDICATION
Status: COMPLETED | OUTPATIENT
Start: 2021-12-01 | End: 2021-12-01

## 2021-12-01 RX ORDER — ONDANSETRON 2 MG/ML
4 INJECTION INTRAMUSCULAR; INTRAVENOUS EVERY 6 HOURS PRN
Status: DISCONTINUED | OUTPATIENT
Start: 2021-12-01 | End: 2021-12-02 | Stop reason: HOSPADM

## 2021-12-01 RX ORDER — CEFTRIAXONE 1 G/50ML
INJECTION, SOLUTION INTRAVENOUS
Status: COMPLETED | OUTPATIENT
Start: 2021-12-01 | End: 2021-12-01

## 2021-12-01 RX ADMIN — FENTANYL CITRATE 50 MCG: 50 INJECTION, SOLUTION INTRAMUSCULAR; INTRAVENOUS at 11:12

## 2021-12-01 RX ADMIN — IOHEXOL 25 ML: 300 INJECTION, SOLUTION INTRAVENOUS at 11:12

## 2021-12-01 RX ADMIN — MIDAZOLAM HYDROCHLORIDE 1 MG: 1 INJECTION INTRAMUSCULAR; INTRAVENOUS at 11:12

## 2021-12-01 RX ADMIN — LIDOCAINE HYDROCHLORIDE 10 ML: 10 INJECTION, SOLUTION INFILTRATION; PERINEURAL at 11:12

## 2021-12-01 RX ADMIN — CEFTRIAXONE 1 G: 1 INJECTION, SOLUTION INTRAVENOUS at 10:12

## 2022-01-01 NOTE — PROGRESS NOTES
Patient with new onset of feculent drainage via the abdominal drain along with fever and tachycardia.  Plain abdominal film earlier today showed a small amount of pneumoperitoneum.  CT scan done this evening showed large volume pneumoperitoneum and free fluid concerning for colocolonic anastomotic leak.  Patient being taken to the OR emergently for exploratory laparotomy.  Urology team aware.    Hammad Reynolds MD, FACS, FASCRS  Senior Staff Surgeon  Department of Colon & Rectal Surgery       head

## 2022-02-12 ENCOUNTER — HOSPITAL ENCOUNTER (INPATIENT)
Facility: HOSPITAL | Age: 57
LOS: 3 days | Discharge: HOME | DRG: 699 | End: 2022-02-15
Attending: EMERGENCY MEDICINE | Admitting: EMERGENCY MEDICINE
Payer: MEDICAID

## 2022-02-12 DIAGNOSIS — T83.512D URINARY TRACT INFECTION ASSOCIATED WITH NEPHROSTOMY CATHETER, SUBSEQUENT ENCOUNTER: ICD-10-CM

## 2022-02-12 DIAGNOSIS — N12 PYELONEPHRITIS: ICD-10-CM

## 2022-02-12 DIAGNOSIS — N39.0 URINARY TRACT INFECTION ASSOCIATED WITH NEPHROSTOMY CATHETER, SUBSEQUENT ENCOUNTER: ICD-10-CM

## 2022-02-12 DIAGNOSIS — N39.0 URINARY TRACT INFECTION WITHOUT HEMATURIA, SITE UNSPECIFIED: ICD-10-CM

## 2022-02-12 DIAGNOSIS — N13.1 HYDRONEPHROSIS WITH URETERAL STRICTURE, NOT ELSEWHERE CLASSIFIED: Chronic | ICD-10-CM

## 2022-02-12 DIAGNOSIS — Z93.6 NEPHROSTOMY STATUS: Chronic | ICD-10-CM

## 2022-02-12 DIAGNOSIS — R10.9 RIGHT FLANK PAIN: Primary | ICD-10-CM

## 2022-02-12 PROBLEM — A41.9 SEPTIC SHOCK: Status: RESOLVED | Noted: 2021-04-17 | Resolved: 2022-02-12

## 2022-02-12 PROBLEM — R65.21 SEPTIC SHOCK: Status: RESOLVED | Noted: 2021-04-17 | Resolved: 2022-02-12

## 2022-02-12 PROBLEM — N17.9 AKI (ACUTE KIDNEY INJURY): Status: RESOLVED | Noted: 2020-03-21 | Resolved: 2022-02-12

## 2022-02-12 PROBLEM — Z79.01 CHRONIC ANTICOAGULATION: Chronic | Status: RESOLVED | Noted: 2021-02-03 | Resolved: 2022-02-12

## 2022-02-12 PROBLEM — R07.9 CHEST PAIN: Status: RESOLVED | Noted: 2021-09-13 | Resolved: 2022-02-12

## 2022-02-12 PROBLEM — A41.9 SEVERE SEPSIS: Status: RESOLVED | Noted: 2021-04-16 | Resolved: 2022-02-12

## 2022-02-12 PROBLEM — I82.403 DVT OF LOWER EXTREMITY, BILATERAL: Status: RESOLVED | Noted: 2020-11-04 | Resolved: 2022-02-12

## 2022-02-12 PROBLEM — R65.20 SEVERE SEPSIS: Status: RESOLVED | Noted: 2021-04-16 | Resolved: 2022-02-12

## 2022-02-12 PROBLEM — Z23 NEED FOR SHINGLES VACCINE: Status: RESOLVED | Noted: 2020-05-18 | Resolved: 2022-02-12

## 2022-02-12 LAB
ALBUMIN SERPL BCP-MCNC: 3.3 G/DL (ref 3.5–5.2)
ALP SERPL-CCNC: 141 U/L (ref 55–135)
ALT SERPL W/O P-5'-P-CCNC: 21 U/L (ref 10–44)
ANION GAP SERPL CALC-SCNC: 9 MMOL/L (ref 8–16)
AST SERPL-CCNC: 20 U/L (ref 10–40)
BACTERIA #/AREA URNS AUTO: ABNORMAL /HPF
BACTERIA #/AREA URNS AUTO: ABNORMAL /HPF
BASOPHILS # BLD AUTO: 0.05 K/UL (ref 0–0.2)
BASOPHILS NFR BLD: 0.4 % (ref 0–1.9)
BILIRUB SERPL-MCNC: 0.3 MG/DL (ref 0.1–1)
BILIRUB UR QL STRIP: NEGATIVE
BUN SERPL-MCNC: 24 MG/DL (ref 6–20)
CALCIUM SERPL-MCNC: 9.8 MG/DL (ref 8.7–10.5)
CHLORIDE SERPL-SCNC: 110 MMOL/L (ref 95–110)
CLARITY UR REFRACT.AUTO: ABNORMAL
CO2 SERPL-SCNC: 21 MMOL/L (ref 23–29)
COLOR UR AUTO: ABNORMAL
CREAT SERPL-MCNC: 1.3 MG/DL (ref 0.5–1.4)
CTP QC/QA: YES
DIFFERENTIAL METHOD: ABNORMAL
EOSINOPHIL # BLD AUTO: 0.1 K/UL (ref 0–0.5)
EOSINOPHIL NFR BLD: 1.1 % (ref 0–8)
ERYTHROCYTE [DISTWIDTH] IN BLOOD BY AUTOMATED COUNT: 14.3 % (ref 11.5–14.5)
EST. GFR  (AFRICAN AMERICAN): 51.9 ML/MIN/1.73 M^2
EST. GFR  (NON AFRICAN AMERICAN): 45 ML/MIN/1.73 M^2
GLUCOSE SERPL-MCNC: 80 MG/DL (ref 70–110)
GLUCOSE UR QL STRIP: NEGATIVE
HCT VFR BLD AUTO: 41.3 % (ref 37–48.5)
HGB BLD-MCNC: 12.7 G/DL (ref 12–16)
HGB UR QL STRIP: ABNORMAL
HYALINE CASTS UR QL AUTO: 0 /LPF
HYALINE CASTS UR QL AUTO: 0 /LPF
IMM GRANULOCYTES # BLD AUTO: 0.02 K/UL (ref 0–0.04)
IMM GRANULOCYTES NFR BLD AUTO: 0.2 % (ref 0–0.5)
KETONES UR QL STRIP: NEGATIVE
LACTATE SERPL-SCNC: 1.7 MMOL/L (ref 0.5–2.2)
LEUKOCYTE ESTERASE UR QL STRIP: ABNORMAL
LYMPHOCYTES # BLD AUTO: 2.6 K/UL (ref 1–4.8)
LYMPHOCYTES NFR BLD: 21.2 % (ref 18–48)
MCH RBC QN AUTO: 27.5 PG (ref 27–31)
MCHC RBC AUTO-ENTMCNC: 30.8 G/DL (ref 32–36)
MCV RBC AUTO: 89 FL (ref 82–98)
MICROSCOPIC COMMENT: ABNORMAL
MICROSCOPIC COMMENT: ABNORMAL
MONOCYTES # BLD AUTO: 1.4 K/UL (ref 0.3–1)
MONOCYTES NFR BLD: 11.2 % (ref 4–15)
NEUTROPHILS # BLD AUTO: 7.9 K/UL (ref 1.8–7.7)
NEUTROPHILS NFR BLD: 65.9 % (ref 38–73)
NITRITE UR QL STRIP: POSITIVE
NRBC BLD-RTO: 0 /100 WBC
PH UR STRIP: 5 [PH] (ref 5–8)
PH UR STRIP: 6 [PH] (ref 5–8)
PH UR STRIP: 6 [PH] (ref 5–8)
PLATELET # BLD AUTO: 255 K/UL (ref 150–450)
PMV BLD AUTO: 9.9 FL (ref 9.2–12.9)
POTASSIUM SERPL-SCNC: 4.2 MMOL/L (ref 3.5–5.1)
PROT SERPL-MCNC: 7.8 G/DL (ref 6–8.4)
PROT UR QL STRIP: ABNORMAL
RBC # BLD AUTO: 4.62 M/UL (ref 4–5.4)
RBC #/AREA URNS AUTO: 45 /HPF (ref 0–4)
RBC #/AREA URNS AUTO: 53 /HPF (ref 0–4)
SARS-COV-2 RDRP RESP QL NAA+PROBE: NEGATIVE
SODIUM SERPL-SCNC: 140 MMOL/L (ref 136–145)
SP GR UR STRIP: 1.01 (ref 1–1.03)
SP GR UR STRIP: 1.02 (ref 1–1.03)
SP GR UR STRIP: 1.02 (ref 1–1.03)
URN SPEC COLLECT METH UR: ABNORMAL
WBC # BLD AUTO: 12.05 K/UL (ref 3.9–12.7)
WBC #/AREA URNS AUTO: >100 /HPF (ref 0–5)
WBC #/AREA URNS AUTO: >100 /HPF (ref 0–5)
WBC CLUMPS UR QL AUTO: ABNORMAL
WBC CLUMPS UR QL AUTO: ABNORMAL

## 2022-02-12 PROCEDURE — 83605 ASSAY OF LACTIC ACID: CPT | Performed by: PHYSICIAN ASSISTANT

## 2022-02-12 PROCEDURE — 96365 THER/PROPH/DIAG IV INF INIT: CPT

## 2022-02-12 PROCEDURE — 99220 PR INITIAL OBSERVATION CARE,LEVL III: CPT | Mod: ,,, | Performed by: HOSPITALIST

## 2022-02-12 PROCEDURE — 25000003 PHARM REV CODE 250: Performed by: HOSPITALIST

## 2022-02-12 PROCEDURE — G0378 HOSPITAL OBSERVATION PER HR: HCPCS

## 2022-02-12 PROCEDURE — 25000003 PHARM REV CODE 250: Performed by: PHYSICIAN ASSISTANT

## 2022-02-12 PROCEDURE — 81001 URINALYSIS AUTO W/SCOPE: CPT | Mod: 91 | Performed by: PHYSICIAN ASSISTANT

## 2022-02-12 PROCEDURE — 96375 TX/PRO/DX INJ NEW DRUG ADDON: CPT

## 2022-02-12 PROCEDURE — 87186 SC STD MICRODIL/AGAR DIL: CPT | Mod: 59 | Performed by: PHYSICIAN ASSISTANT

## 2022-02-12 PROCEDURE — 99285 EMERGENCY DEPT VISIT HI MDM: CPT | Mod: CS,,, | Performed by: EMERGENCY MEDICINE

## 2022-02-12 PROCEDURE — 63600175 PHARM REV CODE 636 W HCPCS: Performed by: PHYSICIAN ASSISTANT

## 2022-02-12 PROCEDURE — 87077 CULTURE AEROBIC IDENTIFY: CPT | Performed by: PHYSICIAN ASSISTANT

## 2022-02-12 PROCEDURE — 85025 COMPLETE CBC W/AUTO DIFF WBC: CPT | Performed by: PHYSICIAN ASSISTANT

## 2022-02-12 PROCEDURE — 87088 URINE BACTERIA CULTURE: CPT | Performed by: PHYSICIAN ASSISTANT

## 2022-02-12 PROCEDURE — 80053 COMPREHEN METABOLIC PANEL: CPT | Performed by: PHYSICIAN ASSISTANT

## 2022-02-12 PROCEDURE — 99220 PR INITIAL OBSERVATION CARE,LEVL III: ICD-10-PCS | Mod: ,,, | Performed by: HOSPITALIST

## 2022-02-12 PROCEDURE — 87040 BLOOD CULTURE FOR BACTERIA: CPT | Performed by: PHYSICIAN ASSISTANT

## 2022-02-12 PROCEDURE — 99285 EMERGENCY DEPT VISIT HI MDM: CPT | Mod: 25

## 2022-02-12 PROCEDURE — 81001 URINALYSIS AUTO W/SCOPE: CPT | Performed by: EMERGENCY MEDICINE

## 2022-02-12 PROCEDURE — 87086 URINE CULTURE/COLONY COUNT: CPT | Performed by: PHYSICIAN ASSISTANT

## 2022-02-12 PROCEDURE — 20600001 HC STEP DOWN PRIVATE ROOM

## 2022-02-12 PROCEDURE — U0002 COVID-19 LAB TEST NON-CDC: HCPCS | Performed by: PHYSICIAN ASSISTANT

## 2022-02-12 PROCEDURE — 86803 HEPATITIS C AB TEST: CPT | Performed by: EMERGENCY MEDICINE

## 2022-02-12 PROCEDURE — 99285 PR EMERGENCY DEPT VISIT,LEVEL V: ICD-10-PCS | Mod: CS,,, | Performed by: EMERGENCY MEDICINE

## 2022-02-12 PROCEDURE — 96361 HYDRATE IV INFUSION ADD-ON: CPT

## 2022-02-12 PROCEDURE — 87389 HIV-1 AG W/HIV-1&-2 AB AG IA: CPT | Performed by: EMERGENCY MEDICINE

## 2022-02-12 RX ORDER — ACETAMINOPHEN 325 MG/1
650 TABLET ORAL EVERY 6 HOURS PRN
Status: DISCONTINUED | OUTPATIENT
Start: 2022-02-12 | End: 2022-02-15 | Stop reason: HOSPADM

## 2022-02-12 RX ORDER — PROCHLORPERAZINE EDISYLATE 5 MG/ML
5 INJECTION INTRAMUSCULAR; INTRAVENOUS EVERY 6 HOURS PRN
Status: DISCONTINUED | OUTPATIENT
Start: 2022-02-12 | End: 2022-02-15 | Stop reason: HOSPADM

## 2022-02-12 RX ORDER — SODIUM CHLORIDE 0.9 % (FLUSH) 0.9 %
10 SYRINGE (ML) INJECTION
Status: DISCONTINUED | OUTPATIENT
Start: 2022-02-12 | End: 2022-02-15 | Stop reason: HOSPADM

## 2022-02-12 RX ORDER — ONDANSETRON 2 MG/ML
4 INJECTION INTRAMUSCULAR; INTRAVENOUS
Status: COMPLETED | OUTPATIENT
Start: 2022-02-12 | End: 2022-02-12

## 2022-02-12 RX ORDER — OXYCODONE HYDROCHLORIDE 10 MG/1
10 TABLET ORAL EVERY 6 HOURS PRN
Status: DISCONTINUED | OUTPATIENT
Start: 2022-02-12 | End: 2022-02-15 | Stop reason: HOSPADM

## 2022-02-12 RX ORDER — ENOXAPARIN SODIUM 100 MG/ML
40 INJECTION SUBCUTANEOUS EVERY 24 HOURS
Status: DISCONTINUED | OUTPATIENT
Start: 2022-02-13 | End: 2022-02-15 | Stop reason: HOSPADM

## 2022-02-12 RX ORDER — VANCOMYCIN HCL IN 5 % DEXTROSE 1G/250ML
15 PLASTIC BAG, INJECTION (ML) INTRAVENOUS
Status: COMPLETED | OUTPATIENT
Start: 2022-02-12 | End: 2022-02-12

## 2022-02-12 RX ORDER — MEROPENEM AND SODIUM CHLORIDE 1 G/50ML
1 INJECTION, SOLUTION INTRAVENOUS
Status: COMPLETED | OUTPATIENT
Start: 2022-02-12 | End: 2022-02-12

## 2022-02-12 RX ORDER — VANCOMYCIN HCL IN 5 % DEXTROSE 1G/250ML
15 PLASTIC BAG, INJECTION (ML) INTRAVENOUS
Status: DISCONTINUED | OUTPATIENT
Start: 2022-02-13 | End: 2022-02-14

## 2022-02-12 RX ORDER — MIRTAZAPINE 15 MG/1
15 TABLET, ORALLY DISINTEGRATING ORAL NIGHTLY
Status: DISCONTINUED | OUTPATIENT
Start: 2022-02-12 | End: 2022-02-15 | Stop reason: HOSPADM

## 2022-02-12 RX ORDER — MORPHINE SULFATE 4 MG/ML
4 INJECTION, SOLUTION INTRAMUSCULAR; INTRAVENOUS
Status: COMPLETED | OUTPATIENT
Start: 2022-02-12 | End: 2022-02-12

## 2022-02-12 RX ORDER — TALC
6 POWDER (GRAM) TOPICAL NIGHTLY PRN
Status: DISCONTINUED | OUTPATIENT
Start: 2022-02-12 | End: 2022-02-15 | Stop reason: HOSPADM

## 2022-02-12 RX ORDER — OXYCODONE HYDROCHLORIDE 5 MG/1
5 TABLET ORAL EVERY 6 HOURS PRN
Status: DISCONTINUED | OUTPATIENT
Start: 2022-02-12 | End: 2022-02-15 | Stop reason: HOSPADM

## 2022-02-12 RX ORDER — GABAPENTIN 100 MG/1
100 CAPSULE ORAL NIGHTLY
Status: DISCONTINUED | OUTPATIENT
Start: 2022-02-12 | End: 2022-02-15 | Stop reason: HOSPADM

## 2022-02-12 RX ORDER — SODIUM BICARBONATE 650 MG/1
1300 TABLET ORAL 2 TIMES DAILY
Status: DISCONTINUED | OUTPATIENT
Start: 2022-02-12 | End: 2022-02-15 | Stop reason: HOSPADM

## 2022-02-12 RX ADMIN — VANCOMYCIN HYDROCHLORIDE 1000 MG: 1 INJECTION, POWDER, LYOPHILIZED, FOR SOLUTION INTRAVENOUS at 08:02

## 2022-02-12 RX ADMIN — MIRTAZAPINE 15 MG: 15 TABLET, ORALLY DISINTEGRATING ORAL at 10:02

## 2022-02-12 RX ADMIN — MORPHINE SULFATE 4 MG: 4 INJECTION INTRAVENOUS at 04:02

## 2022-02-12 RX ADMIN — SODIUM CHLORIDE 1000 ML: 0.9 INJECTION, SOLUTION INTRAVENOUS at 04:02

## 2022-02-12 RX ADMIN — MEROPENEM AND SODIUM CHLORIDE 1 G: 1 INJECTION, SOLUTION INTRAVENOUS at 07:02

## 2022-02-12 RX ADMIN — GABAPENTIN 100 MG: 100 CAPSULE ORAL at 09:02

## 2022-02-12 RX ADMIN — ONDANSETRON 4 MG: 2 INJECTION INTRAMUSCULAR; INTRAVENOUS at 04:02

## 2022-02-12 RX ADMIN — SODIUM BICARBONATE 650 MG TABLET 1300 MG: at 09:02

## 2022-02-12 RX ADMIN — SODIUM CHLORIDE 1000 ML: 0.9 INJECTION, SOLUTION INTRAVENOUS at 08:02

## 2022-02-12 NOTE — ED PROVIDER NOTES
Encounter Date: 2/12/2022       History     Chief Complaint   Patient presents with    Flank Pain     Pain to r nephrostomy tube site     58 y/o F with history of HTN, GERD, CVA, cervical cancer, s/p bilateral nephrostomy tubes presents to the ED c/o R flank pain that started today.  She has been having subjective fever, chills, fatigue, malaise x 2 days.  She noticed decreased output from R nephrostomy tube today.  She is complaining of 10/10 R flank pain that started today. She has not taken any OTC medications. She is not currently on any abx. No falls or trauma.  Her last nephrostomy tube exchange was on 12/1/21 - next one is scheduled for March. She denies fever, chest pain, abdominal pain, nausea, vomiting.     The history is provided by the patient.     Review of patient's allergies indicates:   Allergen Reactions    Bee sting [allergen ext-venom-honey bee]      Rash      Grass pollen-bermuda, standard      rash     Past Medical History:   Diagnosis Date    Abnormal mammogram 8/25/2020    Acute deep vein thrombosis (DVT) of lower extremity 12/9/2020    Anxiety     Cervical cancer 2014    Chronic back pain     Depression     Diarrhea due to malabsorption 11/14/2018    Fibromyalgia     Fungemia 9/27/2020    Generalized abdominal pain 8/25/2020    GERD (gastroesophageal reflux disease)     Hemifacial spasm 9/16/2015    Hiatal hernia 2014    History of cervical cancer 10/11/2018    Hx of psychiatric care     Cymbalta, trazodone    Hypertension     Hypomagnesemia 11/21/2018    Lactose intolerance     Metastatic squamous cell carcinoma to lymph node 10/11/2018    Nephrostomy tube displaced     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 drug 11/14/2018    Osteoarthritis of back     Peritonitis 9/22/2020    Pseudomonas urinary tract infection 4/21/2021    Psychiatric problem     Schatzki's ring 9/14/2015    Seen on outside EGD 05/2014, underwent esophageal dilatation. Bx were negative.      Stroke 1972    Urinary tract infection associated with nephrostomy catheter 6/11/2020    Wound infection after surgery 9/24/2020     Past Surgical History:   Procedure Laterality Date    ANTEGRADE NEPHROSTOGRAPHY Bilateral 9/28/2020    Procedure: Nephrostogram - antegrade;  Surgeon: Leslie Balbuena MD;  [facility];  Service: Urology;  Laterality: Bilateral;    ANTEGRADE NEPHROSTOGRAPHY Left 4/20/2021    Procedure: NEPHROSTOGRAM, ANTEGRADE;  Surgeon: Leslie Balbuena MD;  Location: [facility] OR [address] [place] [address];  Service: Urology;  Laterality: Left;    BILATERAL OOPHORECTOMY  2015    CHOLECYSTECTOMY  11/09/2016    Done at [facility], path showed chronic cholecystitis and gallstones    cold knife conization  2014    COLECTOMY, RIGHT  9/17/2020    Procedure: COLECTOMY, RIGHT Extended;  Surgeon: Hammad Reynolds MD;  Location: [facility] OR 2ND FLR;  Service: Colon and Rectal;;    COLONOSCOPY  2014    [facility]    COLONOSCOPY N/A 5/18/2018    Procedure: COLONOSCOPY;  Surgeon: Arden Gutiérrez MD;  Location: [facility] (4TH FLR);  Service: Endoscopy;  Laterality: N/A;    COLONOSCOPY N/A 7/28/2020    Procedure: COLONOSCOPY;  Surgeon: Hammad Reynolds MD;  Location: [facility] (4TH FLR);  Service: Colon and Rectal;  Laterality: N/A;  [facility]    CYSTOSCOPY WITH URETEROSCOPY, RETROGRADE PYELOGRAPHY, AND INSERTION OF STENT Bilateral 3/21/2020    Procedure: CYSTOSCOPY, WITH RETROGRADE PYELOGRAM,;  Surgeon: Leslie Balbuena MD;  [facility];  Service: Urology;  Laterality: Bilateral;    ESOPHAGOGASTRODUODENOSCOPY  2014    ESOPHAGOGASTRODUODENOSCOPY  11/18/2020    ESOPHAGOGASTRODUODENOSCOPY N/A 11/18/2020    Procedure: ESOPHAGOGASTRODUODENOSCOPY (EGD);  Surgeon: Zenon Spencer MD;  Location: [facility] ENDO;  Service: Endoscopy;  Laterality: N/A;    ESOPHAGOGASTRODUODENOSCOPY N/A 12/11/2020    Procedure: EGD (ESOPHAGOGASTRODUODENOSCOPY);   Surgeon: Juancho Muse MD;  Location: [facility] ENDO (2ND FLR);  Service: Endoscopy;  Laterality: N/A;    HYSTERECTOMY  2014    [facility] for cervical cancer    ILEOSTOMY  9/17/2020    Procedure: CREATION, ILEOSTOMY;  Surgeon: Hammad Reynolds MD;  Location: [facility] OR 2ND FLR;  Service: Colon and Rectal;;    LOOPOGRAM N/A 4/20/2021    Procedure: LOOPOGRAM;  Surgeon: Leslie Balbuena MD;  Location: [facility] OR 1ST FLR;  Service: Urology;  Laterality: N/A;    MOBILIZATION OF SPLENIC FLEXURE N/A 9/11/2020    Procedure: MOBILIZATION, COLONIC;  Surgeon: Hammad Reynolds MD;  Location: [facility] OR 2ND FLR;  Service: Colon and Rectal;  Laterality: N/A;    NEPHROSTOGRAPHY Bilateral 4/17/2021    Procedure: Nephrostogram;  Surgeon: Celeste Surgeon;  Location: [facility] CELESTE;  Service: Anesthesiology;  Laterality: Bilateral;    OOPHORECTOMY      RETROPERITONEAL LYMPHADENECTOMY Right 9/17/2018    Procedure: LYMPHADENECTOMY, RETROPERITONEUM;  Surgeon: Sebas Reed MD;  Location: [facility] OR 2ND FLR;  Service: General;  Laterality: Right;  ILIAC     Family History   Problem Relation Age of Onset    Heart disease Sister     Heart disease Maternal Grandmother     Colon cancer Father     Esophageal cancer Father     Cancer Father         Lung-smoker    Cancer Mother         Cervical    Cervical cancer Mother     Breast cancer Maternal Aunt     Suicide Daughter         jumped from parking structure    Drug abuse Daughter     Drug abuse Daughter     Rectal cancer Neg Hx     Stomach cancer Neg Hx     Crohn's disease Neg Hx     Ulcerative colitis Neg Hx     Diabetes Neg Hx     Hypertension Neg Hx      Social History     Tobacco Use    Smoking status: Former Smoker    Smokeless tobacco: Never Used   Substance Use Topics    Alcohol use: No     Alcohol/week: 0.0 standard drinks    Drug use: No     Review of Systems   Constitutional: Positive for chills, fatigue and fever (subjective).   HENT: Negative for congestion, postnasal drip and sore  throat.    Eyes: Negative for photophobia and visual disturbance.   Respiratory: Negative for cough and shortness of breath.    Cardiovascular: Negative for chest pain.   Gastrointestinal: Negative for abdominal pain, constipation, diarrhea, nausea and vomiting.   Genitourinary: Positive for decreased urine volume and flank pain.        +bilateral nephrostomy tubes   Musculoskeletal: Positive for back pain.   Skin: Negative for rash and wound.   Neurological: Positive for weakness (generalized). Negative for syncope, light-headedness and headaches.   Psychiatric/Behavioral: Negative for confusion.       Physical Exam     Initial Vitals [02/12/22 1524]   BP Pulse Resp Temp SpO2   108/63 86 18 98.4 °F (36.9 °C) 98 %      MAP       --         Physical Exam    Nursing note and vitals reviewed.  Constitutional: She appears well-developed and well-nourished. She is not diaphoretic. No distress.   HENT:   Head: Normocephalic and atraumatic.   Neck: Neck supple.   Normal range of motion.  Cardiovascular: Normal rate, regular rhythm and normal heart sounds. Exam reveals no gallop and no friction rub.    No murmur heard.  Pulmonary/Chest: Breath sounds normal. She has no wheezes. She has no rhonchi. She has no rales.   Abdominal: Abdomen is soft. Bowel sounds are normal. There is no abdominal tenderness.   Bilateral nephrostomy tubes noted, low volume cloudy urine noted in both bags. Dressings over nephrostomy tubes loose, dirty.  Ostomy noted to the R abdomen There is no rebound and no guarding.   Musculoskeletal:         General: Normal range of motion.      Cervical back: Normal range of motion and neck supple.     Neurological: She is alert and oriented to person, place, and time.   Skin: Skin is warm and dry. No rash noted. No erythema.   Psychiatric: She has a normal mood and affect.         ED Course   Procedures  Labs Reviewed   CBC W/ AUTO DIFFERENTIAL - Abnormal; Notable for the following components:       Result  Value    MCHC 30.8 (*)     Gran # (ANC) 7.9 (*)     Mono # 1.4 (*)     All other components within normal limits    Narrative:     Release to patient->Immediate   COMPREHENSIVE METABOLIC PANEL - Abnormal; Notable for the following components:    CO2 21 (*)     BUN 24 (*)     Albumin 3.3 (*)     Alkaline Phosphatase 141 (*)     eGFR if  51.9 (*)     eGFR if non  45.0 (*)     All other components within normal limits    Narrative:     Release to patient->Immediate   URINALYSIS - Abnormal; Notable for the following components:    Appearance, UA Cloudy (*)     Protein, UA 2+ (*)     Occult Blood UA 3+ (*)     Nitrite, UA Positive (*)     Leukocytes, UA 3+ (*)     All other components within normal limits    Narrative:     LEFT NEPHROSTOMY TUBE    URINALYSIS - Abnormal; Notable for the following components:    Appearance, UA Cloudy (*)     Protein, UA 2+ (*)     Occult Blood UA 3+ (*)     Nitrite, UA Positive (*)     Leukocytes, UA 3+ (*)     All other components within normal limits    Narrative:     LEFT NEPHROSTOMY TUBE    URINALYSIS - Abnormal; Notable for the following components:    Appearance, UA Cloudy (*)     Protein, UA 2+ (*)     Occult Blood UA 3+ (*)     Nitrite, UA Positive (*)     Leukocytes, UA 3+ (*)     All other components within normal limits    Narrative:     RIGHT NEPHROSTOMY TUBE   URINALYSIS MICROSCOPIC - Abnormal; Notable for the following components:    RBC, UA 53 (*)     WBC, UA >100 (*)     WBC Clumps, UA Many (*)     Bacteria Many (*)     All other components within normal limits    Narrative:     LEFT NEPHROSTOMY TUBE    URINALYSIS MICROSCOPIC - Abnormal; Notable for the following components:    RBC, UA 45 (*)     WBC, UA >100 (*)     WBC Clumps, UA Many (*)     Bacteria Many (*)     All other components within normal limits    Narrative:     RIGHT NEPHROSTOMY TUBE   CULTURE, URINE   CULTURE, URINE   CULTURE, BLOOD   CULTURE, BLOOD   LACTIC ACID, PLASMA     Narrative:     Release to patient->Immediate   HIV 1 / 2 ANTIBODY   HEPATITIS C ANTIBODY   SARS-COV-2 RDRP GENE    Narrative:     This test utilizes isothermal nucleic acid amplification   technology to detect the SARS-CoV-2 RdRp nucleic acid segment.   The analytical sensitivity (limit of detection) is 125 genome   equivalents/mL.   A POSITIVE result implies infection with the SARS-CoV-2 virus;   the patient is presumed to be contagious.     A NEGATIVE result means that SARS-CoV-2 nucleic acids are not   present above the limit of detection. A NEGATIVE result should be   treated as presumptive. It does not rule out the possibility of   COVID-19 and should not be the sole basis for treatment decisions.   If COVID-19 is strongly suspected based on clinical and exposure   history, re-testing using an alternate molecular assay should be   considered.   This test is only for use under the Food and Drug   Administration s Emergency Use Authorization (EUA).   Commercial kits are provided by [employer].   Performance characteristics of the EUA have been independently   verified by [facility] Department of   Pathology and Laboratory Medicine.   _________________________________________________________________   The authorized Fact Sheet for Healthcare Providers and the authorized Fact   Sheet for Patients of the ID NOW COVID-19 are available on the FDA   website:     https://www.fda.gov/media/378039/download  https://www.fda.gov/media/205626/download              Imaging Results          US Retroperitoneal Complete (Final result)  Result time 02/12/22 18:37:53    Final result by Hammad Swartz MD (02/12/22 18:37:53)                 Impression:      Bilateral nephrostomy tubes with similar to slightly more prominent mild bilateral hydronephrosis, right greater than left.    Electronically signed by resident: Prosper Lobo  Date:    02/12/2022  Time:    18:12    Electronically signed by: Hammad Swartz  MD  Date:    02/12/2022  Time:    18:37             Narrative:    EXAMINATION:  US RETROPERITONEAL COMPLETE    CLINICAL HISTORY:  R flank pain. has bilateral nephrostomy tubes;    TECHNIQUE:  Ultrasound of the kidneys and urinary bladder was performed including color flow and Doppler evaluation of the kidneys.    COMPARISON:  Ultrasound retroperitoneal 09/14/2021, 08/31/2021.  CT abdomen pelvis 06/17/2021.    FINDINGS:  Right kidney: The right kidney measures 11.7 cm.  No cortical thinning. No loss of corticomedullary distinction.  Resistive index measures 0.81.  No mass. No renal stone. Nephrostomy tube in place with mild hydronephrosis.    Left kidney: The left kidney measures 12.3 cm. No cortical thinning. No loss of corticomedullary distinction.  Resistive index measures 0.81.  No mass. No renal stone. Nephrostomy tube in place with mild hydronephrosis.    The urinary bladder is not definitely visualized.                                 Medications   vancomycin in dextrose 5 % 1 gram/250 mL IVPB 1,000 mg (has no administration in time range)   meropenem-0.9% sodium chloride 1 g/50 mL IVPB (1 g Intravenous New Bag 2/12/22 1913)   sodium chloride 0.9% bolus 1,000 mL (has no administration in time range)   morphine injection 4 mg (4 mg Intravenous Given 2/12/22 1614)   ondansetron injection 4 mg (4 mg Intravenous Given 2/12/22 1609)   sodium chloride 0.9% bolus 1,000 mL (0 mLs Intravenous Stopped 2/12/22 1705)           APC / Resident Notes:   56 y/o F with history of HTN, GERD, CVA, cervical cancer, s/p bilateral nephrostomy tubes presents to the ED c/o R flank pain that started today.  VSS. RRR. Lungs clear. Abdomen soft and nontender. Bilateral nephrostomy tubes noted. No CVA tenderness. Urine cloudy in nephrostomy bags. DDx includes but is not limited to UTI, sepsis, nephrostomy tube clogged, ODESSA, hydronephrosis. Will get labs, retroperitoneal ultrasound.     COVID negative. No leukocytosis or anemia. Cr at  baseline. Lactic acid normal. Urine samples from bilateral nephrostomy tubes - nitrite positive, >100 WBC. Urine cultures added. Blood cultures pending.    Retroperitoneal ultrasound slightly worse hydronephrosis, R>L.    Based on prior urine cultures, started on IV vancomycin and meropenem.     Placed in observation to  for further management of complicated UTI.        Attending Attestation:     Physician Attestation Statement for NP/PA:   I discussed this assessment and plan of this patient with the NP/PA, but I did not personally examine the patient. The face to face encounter was performed by the NP/PA.                       Clinical Impression:   Final diagnoses:  [R10.9] Right flank pain (Primary)  [N39.0] Urinary tract infection without hematuria, site unspecified          ED Disposition Condition    Observation               Marlyn Kc PA-C  02/12/22 [id number]       Betty Langston MD  02/12/22 1438

## 2022-02-12 NOTE — ED NOTES
BETSY nephrostomy tubes for 2 years, R sided flank pain starting today. Denies fever. General malaise. Vomit x 1 today. States decreased drainage to R nephrostomy bag.     Patient identifiers for Edita Riley 57 y.o. female checked and correct.  Chief Complaint   Patient presents with    Flank Pain     Pain to r nephrostomy tube site     Past Medical History:   Diagnosis Date    Abnormal mammogram 8/25/2020    Acute deep vein thrombosis (DVT) of lower extremity 12/9/2020    Anxiety     Cervical cancer 2014    Chronic back pain     Depression     Diarrhea due to malabsorption 11/14/2018    Fibromyalgia     Fungemia 9/27/2020    Generalized abdominal pain 8/25/2020    GERD (gastroesophageal reflux disease)     Hemifacial spasm 9/16/2015    Hiatal hernia 2014    History of cervical cancer 10/11/2018    Hx of psychiatric care     Cymbalta, trazodone    Hypertension     Hypomagnesemia 11/21/2018    Lactose intolerance     Metastatic squamous cell carcinoma to lymph node 10/11/2018    Nephrostomy tube displaced     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 drug 11/14/2018    Osteoarthritis of back     Peritonitis 9/22/2020    Pseudomonas urinary tract infection 4/21/2021    Psychiatric problem     Schatzki's ring 9/14/2015    Seen on outside EGD 05/2014, underwent esophageal dilatation. Bx were negative.     Stroke 1972    Urinary tract infection associated with nephrostomy catheter 6/11/2020    Wound infection after surgery 9/24/2020     Allergies reported:   Review of patient's allergies indicates:   Allergen Reactions    Bee sting [allergen ext-venom-honey bee]      Rash      Grass pollen-bermuda, standard      rash         LOC: Patient is awake, alert, and aware of environment with an appropriate affect. Patient is oriented x 4 and speaking appropriately.  APPEARANCE: Patient resting comfortably and in no acute distress. Patient is clean and well groomed, patient's clothing is  properly fastened.  HEENT: mm p/m/i  SKIN: The skin is warm and dry. Patient has normal skin turgor and moist mucus membranes.   MUSKULOSKELETAL: Patient is moving all extremities well, no obvious deformities noted. Pulses intact.   RESPIRATORY: Airway is open and patent. Respirations are spontaneous and non-labored with normal effort and rate.  CARDIAC: Patient has a normal rate and rhythm.  No peripheral edema noted.   ABDOMEN: No distention noted. Soft and non-tender upon palpation. Merary nephrostomy tubes to MERARY lower back draining to gravity. Urostomy bag to L abd, patent to bag. Colostomy to R lower abd, patent.   NEUROLOGICAL: pupils 3mm, PERRL. Facial expression is symmetrical. Hand grasps are equal bilaterally. Normal sensation in all extremities when touched with finger.

## 2022-02-13 LAB
ANION GAP SERPL CALC-SCNC: 7 MMOL/L (ref 8–16)
BASOPHILS # BLD AUTO: 0.04 K/UL (ref 0–0.2)
BASOPHILS NFR BLD: 0.4 % (ref 0–1.9)
BUN SERPL-MCNC: 18 MG/DL (ref 6–20)
CALCIUM SERPL-MCNC: 9.3 MG/DL (ref 8.7–10.5)
CHLORIDE SERPL-SCNC: 112 MMOL/L (ref 95–110)
CO2 SERPL-SCNC: 24 MMOL/L (ref 23–29)
CREAT SERPL-MCNC: 1.2 MG/DL (ref 0.5–1.4)
DIFFERENTIAL METHOD: ABNORMAL
EOSINOPHIL # BLD AUTO: 0.1 K/UL (ref 0–0.5)
EOSINOPHIL NFR BLD: 0.8 % (ref 0–8)
ERYTHROCYTE [DISTWIDTH] IN BLOOD BY AUTOMATED COUNT: 14.6 % (ref 11.5–14.5)
EST. GFR  (AFRICAN AMERICAN): 57.2 ML/MIN/1.73 M^2
EST. GFR  (NON AFRICAN AMERICAN): 49.6 ML/MIN/1.73 M^2
GLUCOSE SERPL-MCNC: 90 MG/DL (ref 70–110)
HCT VFR BLD AUTO: 41 % (ref 37–48.5)
HGB BLD-MCNC: 12 G/DL (ref 12–16)
IMM GRANULOCYTES # BLD AUTO: 0.03 K/UL (ref 0–0.04)
IMM GRANULOCYTES NFR BLD AUTO: 0.3 % (ref 0–0.5)
LYMPHOCYTES # BLD AUTO: 1.5 K/UL (ref 1–4.8)
LYMPHOCYTES NFR BLD: 14.5 % (ref 18–48)
MCH RBC QN AUTO: 27 PG (ref 27–31)
MCHC RBC AUTO-ENTMCNC: 29.3 G/DL (ref 32–36)
MCV RBC AUTO: 92 FL (ref 82–98)
MONOCYTES # BLD AUTO: 1.4 K/UL (ref 0.3–1)
MONOCYTES NFR BLD: 13.5 % (ref 4–15)
NEUTROPHILS # BLD AUTO: 7 K/UL (ref 1.8–7.7)
NEUTROPHILS NFR BLD: 70.5 % (ref 38–73)
NRBC BLD-RTO: 0 /100 WBC
PLATELET # BLD AUTO: 247 K/UL (ref 150–450)
PMV BLD AUTO: 9.9 FL (ref 9.2–12.9)
POTASSIUM SERPL-SCNC: 4 MMOL/L (ref 3.5–5.1)
RBC # BLD AUTO: 4.45 M/UL (ref 4–5.4)
SODIUM SERPL-SCNC: 143 MMOL/L (ref 136–145)
WBC # BLD AUTO: 9.99 K/UL (ref 3.9–12.7)

## 2022-02-13 PROCEDURE — 20600001 HC STEP DOWN PRIVATE ROOM

## 2022-02-13 PROCEDURE — 63600175 PHARM REV CODE 636 W HCPCS: Performed by: HOSPITALIST

## 2022-02-13 PROCEDURE — 99225 PR SUBSEQUENT OBSERVATION CARE,LEVEL II: ICD-10-PCS | Mod: ,,, | Performed by: HOSPITALIST

## 2022-02-13 PROCEDURE — 99225 PR SUBSEQUENT OBSERVATION CARE,LEVEL II: CPT | Mod: ,,, | Performed by: HOSPITALIST

## 2022-02-13 PROCEDURE — 96372 THER/PROPH/DIAG INJ SC/IM: CPT | Performed by: HOSPITALIST

## 2022-02-13 PROCEDURE — 80048 BASIC METABOLIC PNL TOTAL CA: CPT | Performed by: HOSPITALIST

## 2022-02-13 PROCEDURE — 36415 COLL VENOUS BLD VENIPUNCTURE: CPT | Performed by: HOSPITALIST

## 2022-02-13 PROCEDURE — 85025 COMPLETE CBC W/AUTO DIFF WBC: CPT | Performed by: HOSPITALIST

## 2022-02-13 PROCEDURE — G0378 HOSPITAL OBSERVATION PER HR: HCPCS

## 2022-02-13 PROCEDURE — 25000003 PHARM REV CODE 250: Performed by: HOSPITALIST

## 2022-02-13 RX ADMIN — GABAPENTIN 100 MG: 100 CAPSULE ORAL at 08:02

## 2022-02-13 RX ADMIN — MIRTAZAPINE 15 MG: 15 TABLET, ORALLY DISINTEGRATING ORAL at 08:02

## 2022-02-13 RX ADMIN — SODIUM BICARBONATE 650 MG TABLET 1300 MG: at 10:02

## 2022-02-13 RX ADMIN — ENOXAPARIN SODIUM 40 MG: 100 INJECTION SUBCUTANEOUS at 05:02

## 2022-02-13 RX ADMIN — CEFTRIAXONE 1 G: 1 INJECTION, SOLUTION INTRAVENOUS at 06:02

## 2022-02-13 RX ADMIN — OXYCODONE HYDROCHLORIDE 10 MG: 10 TABLET ORAL at 05:02

## 2022-02-13 RX ADMIN — VANCOMYCIN HYDROCHLORIDE 1000 MG: 1 INJECTION, POWDER, LYOPHILIZED, FOR SOLUTION INTRAVENOUS at 08:02

## 2022-02-13 RX ADMIN — SODIUM BICARBONATE 650 MG TABLET 1300 MG: at 08:02

## 2022-02-13 NOTE — PROGRESS NOTES
Pharmacokinetic Initial Assessment: IV Vancomycin    Assessment/Plan:    Initiate intravenous vancomycin with loading dose of 1000 mg once, done in ED, followed by a maintenance dose of vancomycin 1000 mg IV every 24 hours.  Desired empiric serum trough concentration is 10 to 20 mcg/mL.  Draw vancomycin trough level 60 min prior to third dose on 02/14/2022 at 1900.  Pharmacy will continue to follow and monitor vancomycin.      Please contact pharmacy at extension [phone] with any questions regarding this assessment.     Thank you for the consult,   Jeremias Guerrero       Patient brief summary:  Edita Riley is a 57 y.o. female initiated on antimicrobial therapy with IV Vancomycin for treatment of suspected urinary tract infection.    Drug Allergies:   Review of patient's allergies indicates:   Allergen Reactions    Bee sting [allergen ext-venom-honey bee]      Rash      Grass pollen-bermuda, standard      rash       Actual Body Weight:   67.6 kg    Renal Function:   Estimated Creatinine Clearance: 48.7 mL/min (based on SCr of 1.3 mg/dL).     CBC (last 72 hours):  Recent Labs   Lab Result Units 02/12/22  1559   WBC K/uL 12.05   Hemoglobin g/dL 12.7   Hematocrit % 41.3   Platelets K/uL 255   Gran % % 65.9   Lymph % % 21.2   Mono % % 11.2   Eosinophil % % 1.1   Basophil % % 0.4   Differential Method  Automated       Metabolic Panel (last 72 hours):  Recent Labs   Lab Result Units 02/12/22  1559 02/12/22  1631 02/12/22  1632   Sodium mmol/L 140  --   --    Potassium mmol/L 4.2  --   --    Chloride mmol/L 110  --   --    CO2 mmol/L 21*  --   --    Glucose mg/dL 80  --   --    Glucose, UA   --  Negative Negative  Negative   BUN mg/dL 24*  --   --    Creatinine mg/dL 1.3  --   --    Albumin g/dL 3.3*  --   --    Total Bilirubin mg/dL 0.3  --   --    Alkaline Phosphatase U/L 141*  --   --    AST U/L 20  --   --    ALT U/L 21  --   --        Drug levels (last 3 results):  No results for input(s):  VANCOMYCINRA, VANCOMYCINPE, VANCOMYCINTR in the last 72 hours.    Microbiologic Results:  Microbiology Results (last 7 days)     Procedure Component Value Units Date/Time    Urine culture [[id number]] Collected: 02/12/22 1855    Order Status: Sent Specimen: Urine, Nephrostomy Tube, Left Updated: 02/12/22 1952    Urine culture [[id number]] Collected: 02/12/22 1854    Order Status: Sent Specimen: Urine, Nephrostomy Tube, Right Updated: 02/12/22 1949    Blood Culture #2 **CANNOT BE ORDERED STAT** [[id number]] Collected: 02/12/22 1900    Order Status: Sent Specimen: Blood from Peripheral, Wrist, Left Updated: 02/12/22 1911    Blood Culture #1 **CANNOT BE ORDERED STAT** [[id number]] Collected: 02/12/22 1555    Order Status: Sent Specimen: Blood from Peripheral, Forearm, Right Updated: 02/12/22 1911

## 2022-02-13 NOTE — H&P
[facility] - [facility] ([address] Medicine  History & Physical    Patient Name: Edita Riley  MRN: [id number]  Patient Class: OP- Observation  Admission Date: 2/12/2022  Attending Physician: Juan Lopez MD   Primary Care Provider: No primary care provider on file.         Patient information was obtained from patient, past medical records and ER records.     Subjective:     Principal Problem:Urinary tract infection associated with nephrostomy catheter    Chief Complaint:   Chief Complaint   Patient presents with    Flank Pain     Pain to r nephrostomy tube site        HPI: 57-year-old female with history of cervical cancer status post surgery and pelvic radiation complicated by ureteral obstruction requiring ileostomy and has had bilateral nephrostomy tubes indwelling since 2020 who presents to the ED for right-sided flank pain.  Pain began abruptly today and feels like she was punched in the side.  She has also had decreased output from that nephrostomy tube, which usually occurs when 1 of her tubes becomes obstructed.  Last nephrostomy exchange was in December, and she is scheduled for another 1 next month.  She still has output from her urostomy.      Past Medical History:   Diagnosis Date    Abnormal mammogram 8/25/2020    Acute deep vein thrombosis (DVT) of lower extremity 12/9/2020    Anxiety     Cervical cancer 2014    Chronic back pain     Depression     Diarrhea due to malabsorption 11/14/2018    DVT of lower extremity, bilateral 11/4/2020    Fibromyalgia     Fungemia 9/27/2020    Generalized abdominal pain 8/25/2020    GERD (gastroesophageal reflux disease)     Hemifacial spasm 9/16/2015    Hiatal hernia 2014    History of cervical cancer 10/11/2018    Hx of psychiatric care     Cymbalta, trazodone    Hypertension     Hypomagnesemia 11/21/2018    Lactose intolerance     Metastatic squamous cell carcinoma to lymph node 10/11/2018    Nephrostomy tube  displaced     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 drug 11/14/2018    Osteoarthritis of back     Peritonitis 9/22/2020    Pseudomonas urinary tract infection 4/21/2021    Psychiatric problem     Schatzki's ring 9/14/2015    Seen on outside EGD 05/2014, underwent esophageal dilatation. Bx were negative.     Stroke 1972    Urinary tract infection associated with nephrostomy catheter 6/11/2020    Wound infection after surgery 9/24/2020       Past Surgical History:   Procedure Laterality Date    ANTEGRADE NEPHROSTOGRAPHY Bilateral 9/28/2020    Procedure: Nephrostogram - antegrade;  Surgeon: Leslie Balbuena MD;  Location: [facility] OR [address] [place] [address];  Service: Urology;  Laterality: Bilateral;    ANTEGRADE NEPHROSTOGRAPHY Left 4/20/2021    Procedure: NEPHROSTOGRAM, ANTEGRADE;  Surgeon: Leslie Balbuena MD;  Location: [facility] OR [address] [place] [address];  Service: Urology;  Laterality: Left;    BILATERAL OOPHORECTOMY  2015    CHOLECYSTECTOMY  11/09/2016    Done at [facility], path showed chronic cholecystitis and gallstones    cold knife conization  2014    COLECTOMY, RIGHT  9/17/2020    Procedure: COLECTOMY, RIGHT Extended;  Surgeon: Hammad Reynolds MD;  Location: [facility] OR 2ND FLR;  Service: Colon and Rectal;;    COLONOSCOPY  2014    COLONOSCOPY N/A 10/24/2016    at [facility], Dr Gutiérrez    COLONOSCOPY N/A 5/18/2018    Procedure: COLONOSCOPY;  Surgeon: Arden Gutiérrez MD;  Location: [facility] (4TH FLR);  Service: Endoscopy;  Laterality: N/A;    COLONOSCOPY N/A 7/28/2020    Procedure: COLONOSCOPY;  Surgeon: Hammad Reynolds MD;  Location: [facility] (4TH FLR);  Service: Colon and Rectal;  Laterality: N/A;  covid test [place] 7/25    CYSTOSCOPY WITH URETEROSCOPY, RETROGRADE PYELOGRAPHY, AND INSERTION OF STENT Bilateral 3/21/2020    Procedure: CYSTOSCOPY, WITH RETROGRADE PYELOGRAM,;  Surgeon: Leslie Balbuena MD;  [facility];  Service: Urology;  Laterality: Bilateral;    ESOPHAGOGASTRODUODENOSCOPY  2014     ESOPHAGOGASTRODUODENOSCOPY  11/18/2020    ESOPHAGOGASTRODUODENOSCOPY N/A 11/18/2020    Procedure: ESOPHAGOGASTRODUODENOSCOPY (EGD);  Surgeon: Zenon Spencer MD;  Location: [facility] ENDO;  Service: Endoscopy;  Laterality: N/A;    ESOPHAGOGASTRODUODENOSCOPY N/A 12/11/2020    Procedure: EGD (ESOPHAGOGASTRODUODENOSCOPY);  Surgeon: Juancho Muse MD;  Location: [facility] ENDO (2ND FLR);  Service: Endoscopy;  Laterality: N/A;    HYSTERECTOMY  2014    [facility] for cervical cancer    ILEOSTOMY  9/17/2020    Procedure: CREATION, ILEOSTOMY;  Surgeon: Hammad Reynolds MD;  Location: [facility] OR 2ND FLR;  Service: Colon and Rectal;;    LOOPOGRAM N/A 4/20/2021    Procedure: LOOPOGRAM;  Surgeon: Leslie Balbuena MD;  Location: [facility] OR 1ST FLR;  Service: Urology;  Laterality: N/A;    MOBILIZATION OF SPLENIC FLEXURE N/A 9/11/2020    Procedure: MOBILIZATION, COLONIC;  Surgeon: Hammad Reynolds MD;  Location: [facility] OR 2ND FLR;  Service: Colon and Rectal;  Laterality: N/A;    NEPHROSTOGRAPHY Bilateral 4/17/2021    Procedure: Nephrostogram;  Surgeon: Celeste Surgeon;  Location: [facility] CELESTE;  Service: Anesthesiology;  Laterality: Bilateral;    OOPHORECTOMY      RETROPERITONEAL LYMPHADENECTOMY Right 9/17/2018    Procedure: LYMPHADENECTOMY, RETROPERITONEUM;  Surgeon: Sebas Reed MD;  [facility];  Service: General;  Laterality: Right;  ILIAC       Review of patient's allergies indicates:   Allergen Reactions    Bee sting [allergen ext-venom-honey bee]      Rash      Grass pollen-bermuda, standard      rash       No current facility-administered medications on file prior to encounter.     Current Outpatient Medications on File Prior to Encounter   Medication Sig    0.9 % sodium chloride (SODIUM CHLORIDE 0.9%) solution Inject into the vein.    ergocalciferol (ERGOCALCIFEROL) 50,000 unit Cap Take 1 capsule (50,000 Units total) by mouth every 7 days.    gabapentin (NEURONTIN) 100 MG capsule Take 1 capsule (100 mg total) by  mouth every evening.    melatonin (MELATIN) 3 mg tablet Take 2 tablets (6 mg total) by mouth nightly as needed for Insomnia.    mirtazapine (REMERON SOL-TAB) 15 MG disintegrating tablet Dissolve 1 tablet (15 mg total) by mouth nightly. Hold while taking Linezolid.    pantoprazole (PROTONIX) 40 MG tablet Take 1 tablet (40 mg total) by mouth once daily.    sodium bicarbonate 650 MG tablet Take 2 tablets (1,300 mg total) by mouth 2 (two) times daily.    [DISCONTINUED] ertapenem (INVANZ) 1 gram injection      Family History     Problem Relation (Age of Onset)    Breast cancer Maternal Aunt    Cancer Father, Mother    Cervical cancer Mother    Colon cancer Father    Drug abuse Daughter, Daughter    Esophageal cancer Father    Heart disease Sister, Maternal Grandmother    Suicide Daughter        Tobacco Use    Smoking status: Former Smoker    Smokeless tobacco: Never Used   Substance and Sexual Activity    Alcohol use: No     Alcohol/week: 0.0 standard drinks    Drug use: No    Sexual activity: Yes     Partners: Male     Birth control/protection: None     Comment:  19 years since 1999     Review of Systems   Constitutional: Positive for chills. Negative for activity change, appetite change and fever.   HENT: Negative for postnasal drip and sinus pressure.    Eyes: Negative for photophobia and visual disturbance.   Respiratory: Negative for cough and shortness of breath.    Cardiovascular: Negative for chest pain and leg swelling.   Gastrointestinal: Negative for abdominal pain, nausea and vomiting.   Endocrine: Positive for cold intolerance. Negative for polydipsia and polyuria.   Genitourinary: Positive for flank pain. Negative for hematuria.   Musculoskeletal: Positive for back pain. Negative for myalgias.   Skin: Negative for color change and rash.   Neurological: Negative for dizziness, syncope and light-headedness.     Objective:     Vital Signs (Most Recent):  Temp: 98.3 °F (36.8 °C) (02/12/22  1840)  Pulse: 74 (02/12/22 2043)  Resp: 18 (02/12/22 2043)  BP: 105/60 (02/12/22 2043)  SpO2: 100 % (02/12/22 2043) Vital Signs (24h Range):  Temp:  [97.8 °F (36.6 °C)-98.5 °F (36.9 °C)] 98.3 °F (36.8 °C)  Pulse:  [71-86] 74  Resp:  [16-20] 18  SpO2:  [98 %-100 %] 100 %  BP: ()/(51-63) 105/60     Weight: 67.6 kg (149 lb)  Body mass index is 22 kg/m².    Physical Exam  Vitals and nursing note reviewed.   Constitutional:       General: She is not in acute distress.     Appearance: She is not ill-appearing.   HENT:      Head: Normocephalic and atraumatic.      Nose:      Comments: Wearing surgical mask     Mouth/Throat:      Comments: Wearing surgical mask  Eyes:      General: No scleral icterus.     Extraocular Movements: Extraocular movements intact.      Conjunctiva/sclera: Conjunctivae normal.      Pupils: Pupils are equal, round, and reactive to light.   Cardiovascular:      Rate and Rhythm: Normal rate and regular rhythm.      Pulses: Normal pulses.      Heart sounds: Normal heart sounds. No murmur heard.  No gallop.    Pulmonary:      Effort: Pulmonary effort is normal.      Breath sounds: Normal breath sounds.   Abdominal:      General: Bowel sounds are normal. There is no distension.      Palpations: Abdomen is soft.      Tenderness: There is no abdominal tenderness. There is right CVA tenderness. There is no guarding.      Comments: Urostomy and ileostomy present   Musculoskeletal:      Cervical back: Normal range of motion and neck supple. No rigidity.      Right lower leg: No edema.      Left lower leg: No edema.   Lymphadenopathy:      Cervical: No cervical adenopathy.   Skin:     General: Skin is warm and dry.      Findings: No bruising or rash.      Comments: Dressings over nephrostomies dirty and loose   Neurological:      Mental Status: She is alert and oriented to person, place, and time.      Cranial Nerves: No cranial nerve deficit.           CRANIAL NERVES     CN III, IV, VI   Pupils are  equal, round, and reactive to light.       Significant Labs:   All pertinent labs within the past 24 hours have been reviewed.  CBC:   Recent Labs   Lab 02/12/22  1559   WBC 12.05   HGB 12.7   HCT 41.3        CMP:   Recent Labs   Lab 02/12/22  1559      K 4.2      CO2 21*   GLU 80   BUN 24*   CREATININE 1.3   CALCIUM 9.8   PROT 7.8   ALBUMIN 3.3*   BILITOT 0.3   ALKPHOS 141*   AST 20   ALT 21   ANIONGAP 9   EGFRNONAA 45.0*     Urine Studies:   Recent Labs   Lab 02/12/22  1632   COLORU Straw  Straw   APPEARANCEUA Cloudy*  Cloudy*   PHUR 6.0  6.0   SPECGRAV 1.025  1.025   PROTEINUA 2+*  2+*   GLUCUA Negative  Negative   KETONESU Negative  Negative   BILIRUBINUA Negative  Negative   OCCULTUA 3+*  3+*   NITRITE Positive*  Positive*   LEUKOCYTESUR 3+*  3+*   RBCUA 53*   WBCUA >100*   BACTERIA Many*   HYALINECASTS 0       Significant Imaging: I have reviewed all pertinent imaging results/findings within the past 24 hours.    Assessment/Plan:     * Urinary tract infection associated with nephrostomy catheter  Order placed for replacement of tube with IR.  Culture pending.  Continue vancomycin as dosed per Pharmacy for skin justin coverage, and based on reviews of her prior urine cultures she does not have any persistent pattern of growing MDROs so empiric coverage with ceftriaxone for now pending culture and sensitivities should be adequate.      Presence of urostomy  As discussed under hydronephrosis, suspect anastomotic strictures presenting adequate drainage of the ureters and further management from Urology may be needed to correct this issue so that she is not dependent on nephrostomies.      Hydronephrosis s/p bilateral nephrostomy tube  Ultrasound demonstrated increased hydronephrosis on the right consistent with obstruction of nephrostomy tube.  Order placed for IR replacement of nephrostomy tube.  It seems unusual that patient would have indwelling nephrostomies for nearly 2 years.   She last saw her urologist in May of last year and she was not aware of any plan to try to correct her ileal loop so that the nephrostomies could be removed.  A loopogram was done prior to that appointment last may but no follow-up action seems to have been taken on it.  She may have anastomotic strictures at the site of her ureteral anastomoses to the ileal loop preventing adequate drainage of the ureters that could be amenable to stenting or some other intervention so that she would not be dependent on nephrostomies that will continuously get infected or be at risk for dislodgement or other complications.  Consider discussing with Urology in the morning regarding where to  her care so that a definitive intervention could be looked into if there is 1 available as an option.        VTE Risk Mitigation (From admission, onward)         Ordered     enoxaparin injection 40 mg  Daily         02/12/22 2125     IP VTE HIGH RISK PATIENT  Once         02/12/22 2125     Place sequential compression device  Until discontinued         02/12/22 2125                   Fransisco Deluna MD  Department of Hospital Medicine   [facility] - Intensive Care ([address])

## 2022-02-13 NOTE — ASSESSMENT & PLAN NOTE
Ultrasound demonstrated increased hydronephrosis on the right consistent with obstruction of nephrostomy tube.  Order placed for IR replacement of nephrostomy tube.  It seems unusual that patient would have indwelling nephrostomies for nearly 2 years.  She last saw her urologist in May of last year and she was not aware of any plan to try to correct her ileal loop so that the nephrostomies could be removed.  A loopogram was done prior to that appointment last may but no follow-up action seems to have been taken on it.  She may have anastomotic strictures at the site of her ureteral anastomoses to the ileal loop preventing adequate drainage of the ureters that could be amenable to stenting or some other intervention so that she would not be dependent on nephrostomies that will continuously get infected or be at risk for dislodgement or other complications.  Consider discussing with Urology in the morning regarding where to  her care so that a definitive intervention could be looked into if there is 1 available as an option.

## 2022-02-13 NOTE — ASSESSMENT & PLAN NOTE
As discussed under hydronephrosis, suspect anastomotic strictures presenting adequate drainage of the ureters and further management from Urology may be needed to correct this issue so that she is not dependent on nephrostomies.

## 2022-02-13 NOTE — SUBJECTIVE & OBJECTIVE
Past Medical History:   Diagnosis Date    Abnormal mammogram 8/25/2020    Acute deep vein thrombosis (DVT) of lower extremity 12/9/2020    Anxiety     Cervical cancer 2014    Chronic back pain     Depression     Diarrhea due to malabsorption 11/14/2018    DVT of lower extremity, bilateral 11/4/2020    Fibromyalgia     Fungemia 9/27/2020    Generalized abdominal pain 8/25/2020    GERD (gastroesophageal reflux disease)     Hemifacial spasm 9/16/2015    Hiatal hernia 2014    History of cervical cancer 10/11/2018    Hx of psychiatric care     Cymbalta, trazodone    Hypertension     Hypomagnesemia 11/21/2018    Lactose intolerance     Metastatic squamous cell carcinoma to lymph node 10/11/2018    Nephrostomy tube displaced     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 drug 11/14/2018    Osteoarthritis of back     Peritonitis 9/22/2020    Pseudomonas urinary tract infection 4/21/2021    Psychiatric problem     Schatzki's ring 9/14/2015    Seen on outside EGD 05/2014, underwent esophageal dilatation. Bx were negative.     Stroke 1972    Urinary tract infection associated with nephrostomy catheter 6/11/2020    Wound infection after surgery 9/24/2020       Past Surgical History:   Procedure Laterality Date    ANTEGRADE NEPHROSTOGRAPHY Bilateral 9/28/2020    Procedure: Nephrostogram - antegrade;  Surgeon: Leslie Balbuena MD;  Location: [facility] OR [address] [place] [address];  Service: Urology;  Laterality: Bilateral;    ANTEGRADE NEPHROSTOGRAPHY Left 4/20/2021    Procedure: NEPHROSTOGRAM, ANTEGRADE;  Surgeon: Leslie Balbuena MD;  Location: [facility] OR [address] [place] [address];  Service: Urology;  Laterality: Left;    BILATERAL OOPHORECTOMY  2015    CHOLECYSTECTOMY  11/09/2016    Done at [facility], path showed chronic cholecystitis and gallstones    cold knife conization  2014    COLECTOMY, RIGHT  9/17/2020    Procedure: COLECTOMY, RIGHT Extended;  Surgeon: Hammad Reynolds MD;  Location: [facility] OR [address] [place] [address];  Service: Colon and Rectal;;     COLONOSCOPY  2014    COLONOSCOPY N/A 10/24/2016    at [facility], Dr Gutiérrez    COLONOSCOPY N/A 5/18/2018    Procedure: COLONOSCOPY;  Surgeon: Arden Gutiérrez MD;  Location: [facility] (4TH FLR);  Service: Endoscopy;  Laterality: N/A;    COLONOSCOPY N/A 7/28/2020    Procedure: COLONOSCOPY;  Surgeon: Hammad Reynolds MD;  Location: [facility] (4TH FLR);  Service: Colon and Rectal;  Laterality: N/A;  covid test [place] 7/25    CYSTOSCOPY WITH URETEROSCOPY, RETROGRADE PYELOGRAPHY, AND INSERTION OF STENT Bilateral 3/21/2020    Procedure: CYSTOSCOPY, WITH RETROGRADE PYELOGRAM,;  Surgeon: Leslie Balbuena MD;  Location: [facility] OR 1ST FLR;  Service: Urology;  Laterality: Bilateral;    ESOPHAGOGASTRODUODENOSCOPY  2014    ESOPHAGOGASTRODUODENOSCOPY  11/18/2020    ESOPHAGOGASTRODUODENOSCOPY N/A 11/18/2020    Procedure: ESOPHAGOGASTRODUODENOSCOPY (EGD);  Surgeon: Zenon Spencer MD;  Location: [facility];  Service: Endoscopy;  Laterality: N/A;    ESOPHAGOGASTRODUODENOSCOPY N/A 12/11/2020    Procedure: EGD (ESOPHAGOGASTRODUODENOSCOPY);  Surgeon: Juancho Muse MD;  Location: [facility] (2ND FLR);  Service: Endoscopy;  Laterality: N/A;    HYSTERECTOMY  2014    [facility] for cervical [facility]    ILEOSTOMY  9/17/2020    Procedure: CREATION, ILEOSTOMY;  Surgeon: Hammad Reynolds MD;  Location: [facility] OR 2ND FLR;  Service: Colon and Rectal;;    LOOPOGRAM N/A 4/20/2021    Procedure: LOOPOGRAM;  Surgeon: Leslie Balbuena MD;  Location: [facility] OR 1ST FLR;  Service: Urology;  Laterality: N/A;    MOBILIZATION OF SPLENIC FLEXURE N/A 9/11/2020    Procedure: MOBILIZATION, COLONIC;  Surgeon: Hammad Reynolds MD;  Location: [facility] OR 2ND FLR;  Service: Colon and Rectal;  Laterality: N/A;    NEPHROSTOGRAPHY Bilateral 4/17/2021    Procedure: Nephrostogram;  Surgeon: Celeste Surgeon;  Location[facility];  Service: Anesthesiology;  Laterality: Bilateral;    OOPHORECTOMY      RETROPERITONEAL LYMPHADENECTOMY Right 9/17/2018    Procedure:  LYMPHADENECTOMY, RETROPERITONEUM;  Surgeon: Sebas Reed MD;  Location: [facility] OR [address] [place] [address];  Service: General;  Laterality: Right;  ILIAC       Review of patient's allergies indicates:   Allergen Reactions    Bee sting [allergen ext-venom-honey bee]      Rash      Grass pollen-bermuda, standard      rash       No current facility-administered medications on file prior to encounter.     Current Outpatient Medications on File Prior to Encounter   Medication Sig    0.9 % sodium chloride (SODIUM CHLORIDE 0.9%) solution Inject into the vein.    ergocalciferol (ERGOCALCIFEROL) 50,000 unit Cap Take 1 capsule (50,000 Units total) by mouth every 7 days.    gabapentin (NEURONTIN) 100 MG capsule Take 1 capsule (100 mg total) by mouth every evening.    melatonin (MELATIN) 3 mg tablet Take 2 tablets (6 mg total) by mouth nightly as needed for Insomnia.    mirtazapine (REMERON SOL-TAB) 15 MG disintegrating tablet Dissolve 1 tablet (15 mg total) by mouth nightly. Hold while taking Linezolid.    pantoprazole (PROTONIX) 40 MG tablet Take 1 tablet (40 mg total) by mouth once daily.    sodium bicarbonate 650 MG tablet Take 2 tablets (1,300 mg total) by mouth 2 (two) times daily.    [DISCONTINUED] ertapenem (INVANZ) 1 gram injection      Family History     Problem Relation (Age of Onset)    Breast cancer Maternal Aunt    Cancer Father, Mother    Cervical cancer Mother    Colon cancer Father    Drug abuse Daughter, Daughter    Esophageal cancer Father    Heart disease Sister, Maternal Grandmother    Suicide Daughter        Tobacco Use    Smoking status: Former Smoker    Smokeless tobacco: Never Used   Substance and Sexual Activity    Alcohol use: No     Alcohol/week: 0.0 standard drinks    Drug use: No    Sexual activity: Yes     Partners: Male     Birth control/protection: None     Comment:  19 years since 1999     Review of Systems   Constitutional: Positive for chills. Negative for activity change, appetite  change and fever.   HENT: Negative for postnasal drip and sinus pressure.    Eyes: Negative for photophobia and visual disturbance.   Respiratory: Negative for cough and shortness of breath.    Cardiovascular: Negative for chest pain and leg swelling.   Gastrointestinal: Negative for abdominal pain, nausea and vomiting.   Endocrine: Positive for cold intolerance. Negative for polydipsia and polyuria.   Genitourinary: Positive for flank pain. Negative for hematuria.   Musculoskeletal: Positive for back pain. Negative for myalgias.   Skin: Negative for color change and rash.   Neurological: Negative for dizziness, syncope and light-headedness.     Objective:     Vital Signs (Most Recent):  Temp: 98.3 °F (36.8 °C) (02/12/22 1840)  Pulse: 74 (02/12/22 2043)  Resp: 18 (02/12/22 2043)  BP: 105/60 (02/12/22 2043)  SpO2: 100 % (02/12/22 2043) Vital Signs (24h Range):  Temp:  [97.8 °F (36.6 °C)-98.5 °F (36.9 °C)] 98.3 °F (36.8 °C)  Pulse:  [71-86] 74  Resp:  [16-20] 18  SpO2:  [98 %-100 %] 100 %  BP: ()/(51-63) 105/60     Weight: 67.6 kg (149 lb)  Body mass index is 22 kg/m².    Physical Exam  Vitals and nursing note reviewed.   Constitutional:       General: She is not in acute distress.     Appearance: She is not ill-appearing.   HENT:      Head: Normocephalic and atraumatic.      Nose:      Comments: Wearing surgical mask     Mouth/Throat:      Comments: Wearing surgical mask  Eyes:      General: No scleral icterus.     Extraocular Movements: Extraocular movements intact.      Conjunctiva/sclera: Conjunctivae normal.      Pupils: Pupils are equal, round, and reactive to light.   Cardiovascular:      Rate and Rhythm: Normal rate and regular rhythm.      Pulses: Normal pulses.      Heart sounds: Normal heart sounds. No murmur heard.  No gallop.    Pulmonary:      Effort: Pulmonary effort is normal.      Breath sounds: Normal breath sounds.   Abdominal:      General: Bowel sounds are normal. There is no distension.       Palpations: Abdomen is soft.      Tenderness: There is no abdominal tenderness. There is right CVA tenderness. There is no guarding.      Comments: Urostomy and ileostomy present   Musculoskeletal:      Cervical back: Normal range of motion and neck supple. No rigidity.      Right lower leg: No edema.      Left lower leg: No edema.   Lymphadenopathy:      Cervical: No cervical adenopathy.   Skin:     General: Skin is warm and dry.      Findings: No bruising or rash.      Comments: Dressings over nephrostomies dirty and loose   Neurological:      Mental Status: She is alert and oriented to person, place, and time.      Cranial Nerves: No cranial nerve deficit.           CRANIAL NERVES     CN III, IV, VI   Pupils are equal, round, and reactive to light.       Significant Labs:   All pertinent labs within the past 24 hours have been reviewed.  CBC:   Recent Labs   Lab 02/12/22  1559   WBC 12.05   HGB 12.7   HCT 41.3        CMP:   Recent Labs   Lab 02/12/22  1559      K 4.2      CO2 21*   GLU 80   BUN 24*   CREATININE 1.3   CALCIUM 9.8   PROT 7.8   ALBUMIN 3.3*   BILITOT 0.3   ALKPHOS 141*   AST 20   ALT 21   ANIONGAP 9   EGFRNONAA 45.0*     Urine Studies:   Recent Labs   Lab 02/12/22  1632   COLORU Straw  Straw   APPEARANCEUA Cloudy*  Cloudy*   PHUR 6.0  6.0   SPECGRAV 1.025  1.025   PROTEINUA 2+*  2+*   GLUCUA Negative  Negative   KETONESU Negative  Negative   BILIRUBINUA Negative  Negative   OCCULTUA 3+*  3+*   NITRITE Positive*  Positive*   LEUKOCYTESUR 3+*  3+*   RBCUA 53*   WBCUA >100*   BACTERIA Many*   HYALINECASTS 0       Significant Imaging: I have reviewed all pertinent imaging results/findings within the past 24 hours.

## 2022-02-13 NOTE — PROGRESS NOTES
Progress Note  [facility]    Primary Team: [facility]  Admit Date: 2/12/2022   Length of Stay:  LOS: 0 days   SUBJECTIVE:   Reason for Admission:  Urinary tract infection associated with nephrostomy catheter    HPI:  57-year-old female with history of cervical cancer status post surgery and pelvic radiation complicated by ureteral obstruction requiring ileostomy and has had bilateral nephrostomy tubes indwelling since 2020 who presents to the ED for right-sided flank pain.  Pain began abruptly today and feels like she was punched in the side.  She has also had decreased output from that nephrostomy tube, which usually occurs when 1 of her tubes becomes obstructed.  Last nephrostomy exchange was in December, and she is scheduled for another 1 next month.  She still has output from her urostomy.    Hospital Course:  Pt was admitted to  for evaluation of nephrostomy obstruction and concern for pyelonephritis.  She was started on Vanc and Rocephin.    Interval history:    Pt reports continued right flank pain, though notes both tubes are now draining urine.    Review of Systems:  Constitutional: no fever or chills  Respiratory: no cough or shortness of breath  Cardiovascular: no chest pain or palpitations  Gastrointestinal: no nausea or vomiting, no abdominal pain or change in bowel habits  Genitourinary: positive for flank pain, negative for hematuria     OBJECTIVE:     Temp:  [98 °F (36.7 °C)-98.4 °F (36.9 °C)]   Pulse:  [5-74]   Resp:  [16-20]   BP: ()/(51-60)   SpO2:  [98 %-100 %]  Body mass index is 22.5 kg/m².  Intake/Outake:  This Shift:  I/O this shift:  In: -   Out: 400 [Urine:400]    Net I/O past 24h:     Intake/Output Summary (Last 24 hours) at 2/13/2022 1736  Last data filed at 2/13/2022 1733  Gross per 24 hour   Intake 749.22 ml   Output 1450 ml   Net -700.78 ml             Physical Exam:  Gen- well-developed, well-nourished, NAD  CVS- S1 and S2 present, RRR  Resp- CTA b/l, no work of  breathing  Abd- BS+, soft, +urostomy with urine present, + ileostomy  Back- + R CVA tenderness.  NT with clean dressings, sediment in tubing, urine in both bags  Ext- no clubbing, cynaosis, or edema    Laboratory:  CBC/Anemia Labs: Coags:    Recent Labs   Lab 02/12/22  1559 02/13/22  0342   WBC 12.05 9.99   HGB 12.7 12.0   HCT 41.3 41.0    247   MCV 89 92   RDW 14.3 14.6*    No results for input(s): PT, INR, APTT in the last 168 hours.     Chemistries:   Recent Labs   Lab 02/12/22  1559 02/13/22  0342    143   K 4.2 4.0    112*   CO2 21* 24   BUN 24* 18   CREATININE 1.3 1.2   CALCIUM 9.8 9.3   PROT 7.8  --    BILITOT 0.3  --    ALKPHOS 141*  --    ALT 21  --    AST 20  --         Medications:  Scheduled Meds:   cefTRIAXone (ROCEPHIN) IVPB  1 g Intravenous Q24H    enoxaparin  40 mg Subcutaneous Daily    gabapentin  100 mg Oral QHS    mirtazapine  15 mg Oral Nightly    sodium bicarbonate  1,300 mg Oral BID    vancomycin (VANCOCIN) IVPB  15 mg/kg Intravenous Q24H                             Continuous Infusions:  PRN Meds:.acetaminophen, melatonin, oxyCODONE, oxyCODONE, prochlorperazine, sodium chloride 0.9%, Pharmacy to dose Vancomycin consult **AND** vancomycin - pharmacy to dose     ASSESSMENT/PLAN:     Active Hospital Problems    Diagnosis  POA    *Urinary tract infection associated with nephrostomy catheter [T83.512A, N39.0]  Yes    Presence of urostomy [Z93.6]  Not Applicable    Hydronephrosis s/p bilateral nephrostomy tube [N13.30]  Yes     Chronic      Resolved Hospital Problems   No resolved problems to display.     Pyelonephritis associated with nephrostomy catheter  -NTs are due to be exchanged in 1 month; pt has had recurrent infections associated with NT since placed in 2020  -Order placed for replacement of NT with IR  -Culture results reviewed; these include Klebsiella, E.coli, Enterococcus, Enterobacter, VRE  -Will continue Vanc and Rocephin for now, but if any decline would  broaden to Carbapenem  -May need ID assistance pending cultures  -NPO past midnight        Presence of urostomy  Hydronephrosis s/p bilateral nephrostomy tube  -US demonstrated increased hydronephrosis on the right consistent with obstruction of nephrostomy tube.    -suspect anastomotic strictures presenting adequate drainage of the ureters   -had loopogram which showed b/l reflux, more sluggish on left than the right  -will need close outpatient f/u with Urology in given last appt 5/2021, for consideration of further planning       DVT ppx- Lovenox  CODE Status- FULL    Dispo- home pending above intervention    Marylin Cortez MD  Hospital Medicine Staff

## 2022-02-13 NOTE — ASSESSMENT & PLAN NOTE
Order placed for replacement of tube with IR.  Culture pending.  Continue vancomycin as dosed per Pharmacy for skin justin coverage, and based on reviews of her prior urine cultures she does not have any persistent pattern of growing MDROs so empiric coverage with ceftriaxone for now pending culture and sensitivities should be adequate.

## 2022-02-13 NOTE — HPI
57-year-old female with history of cervical cancer status post surgery and pelvic radiation complicated by ureteral obstruction requiring ileostomy and has had bilateral nephrostomy tubes indwelling since 2020 who presents to the ED for right-sided flank pain.  Pain began abruptly today and feels like she was punched in the side.  She has also had decreased output from that nephrostomy tube, which usually occurs when 1 of her tubes becomes obstructed.  Last nephrostomy exchange was in December, and she is scheduled for another 1 next month.  She still has output from her urostomy.

## 2022-02-14 LAB
ANION GAP SERPL CALC-SCNC: 7 MMOL/L (ref 8–16)
BACTERIA UR CULT: ABNORMAL
BACTERIA UR CULT: ABNORMAL
BASOPHILS # BLD AUTO: 0.02 K/UL (ref 0–0.2)
BASOPHILS NFR BLD: 0.3 % (ref 0–1.9)
BUN SERPL-MCNC: 20 MG/DL (ref 6–20)
CALCIUM SERPL-MCNC: 9.2 MG/DL (ref 8.7–10.5)
CHLORIDE SERPL-SCNC: 113 MMOL/L (ref 95–110)
CO2 SERPL-SCNC: 23 MMOL/L (ref 23–29)
CREAT SERPL-MCNC: 1.3 MG/DL (ref 0.5–1.4)
DIFFERENTIAL METHOD: ABNORMAL
EOSINOPHIL # BLD AUTO: 0.1 K/UL (ref 0–0.5)
EOSINOPHIL NFR BLD: 1.4 % (ref 0–8)
ERYTHROCYTE [DISTWIDTH] IN BLOOD BY AUTOMATED COUNT: 14.6 % (ref 11.5–14.5)
EST. GFR  (AFRICAN AMERICAN): 51.9 ML/MIN/1.73 M^2
EST. GFR  (NON AFRICAN AMERICAN): 45 ML/MIN/1.73 M^2
GLUCOSE SERPL-MCNC: 108 MG/DL (ref 70–110)
HCT VFR BLD AUTO: 37.1 % (ref 37–48.5)
HGB BLD-MCNC: 11 G/DL (ref 12–16)
IMM GRANULOCYTES # BLD AUTO: 0.02 K/UL (ref 0–0.04)
IMM GRANULOCYTES NFR BLD AUTO: 0.3 % (ref 0–0.5)
LYMPHOCYTES # BLD AUTO: 1.8 K/UL (ref 1–4.8)
LYMPHOCYTES NFR BLD: 23.8 % (ref 18–48)
MCH RBC QN AUTO: 26.9 PG (ref 27–31)
MCHC RBC AUTO-ENTMCNC: 29.6 G/DL (ref 32–36)
MCV RBC AUTO: 91 FL (ref 82–98)
MONOCYTES # BLD AUTO: 1.1 K/UL (ref 0.3–1)
MONOCYTES NFR BLD: 15.2 % (ref 4–15)
NEUTROPHILS # BLD AUTO: 4.4 K/UL (ref 1.8–7.7)
NEUTROPHILS NFR BLD: 59 % (ref 38–73)
NRBC BLD-RTO: 0 /100 WBC
PLATELET # BLD AUTO: 211 K/UL (ref 150–450)
PMV BLD AUTO: 9.8 FL (ref 9.2–12.9)
POTASSIUM SERPL-SCNC: 3.6 MMOL/L (ref 3.5–5.1)
RBC # BLD AUTO: 4.09 M/UL (ref 4–5.4)
SODIUM SERPL-SCNC: 143 MMOL/L (ref 136–145)
WBC # BLD AUTO: 7.38 K/UL (ref 3.9–12.7)

## 2022-02-14 PROCEDURE — 80048 BASIC METABOLIC PNL TOTAL CA: CPT | Performed by: HOSPITALIST

## 2022-02-14 PROCEDURE — 20600001 HC STEP DOWN PRIVATE ROOM

## 2022-02-14 PROCEDURE — 25000003 PHARM REV CODE 250: Performed by: HOSPITALIST

## 2022-02-14 PROCEDURE — 63600175 PHARM REV CODE 636 W HCPCS: Performed by: RADIOLOGY

## 2022-02-14 PROCEDURE — 63600175 PHARM REV CODE 636 W HCPCS: Performed by: HOSPITALIST

## 2022-02-14 PROCEDURE — 99232 SBSQ HOSP IP/OBS MODERATE 35: CPT | Mod: ,,, | Performed by: HOSPITALIST

## 2022-02-14 PROCEDURE — 25500020 PHARM REV CODE 255: Performed by: HOSPITALIST

## 2022-02-14 PROCEDURE — 85025 COMPLETE CBC W/AUTO DIFF WBC: CPT | Performed by: HOSPITALIST

## 2022-02-14 PROCEDURE — 36415 COLL VENOUS BLD VENIPUNCTURE: CPT | Performed by: HOSPITALIST

## 2022-02-14 PROCEDURE — 99232 PR SUBSEQUENT HOSPITAL CARE,LEVL II: ICD-10-PCS | Mod: ,,, | Performed by: HOSPITALIST

## 2022-02-14 RX ORDER — MIDAZOLAM HYDROCHLORIDE 1 MG/ML
INJECTION INTRAMUSCULAR; INTRAVENOUS CODE/TRAUMA/SEDATION MEDICATION
Status: COMPLETED | OUTPATIENT
Start: 2022-02-14 | End: 2022-02-14

## 2022-02-14 RX ORDER — FENTANYL CITRATE 50 UG/ML
INJECTION, SOLUTION INTRAMUSCULAR; INTRAVENOUS CODE/TRAUMA/SEDATION MEDICATION
Status: COMPLETED | OUTPATIENT
Start: 2022-02-14 | End: 2022-02-14

## 2022-02-14 RX ADMIN — IOHEXOL 35 ML: 300 INJECTION, SOLUTION INTRAVENOUS at 05:02

## 2022-02-14 RX ADMIN — MIRTAZAPINE 15 MG: 15 TABLET, ORALLY DISINTEGRATING ORAL at 08:02

## 2022-02-14 RX ADMIN — CEFTRIAXONE 1 G: 1 INJECTION, SOLUTION INTRAVENOUS at 05:02

## 2022-02-14 RX ADMIN — SODIUM BICARBONATE 650 MG TABLET 1300 MG: at 08:02

## 2022-02-14 RX ADMIN — MIDAZOLAM HYDROCHLORIDE 1 MG: 1 INJECTION INTRAMUSCULAR; INTRAVENOUS at 04:02

## 2022-02-14 RX ADMIN — FENTANYL CITRATE 50 MCG: 50 INJECTION, SOLUTION INTRAMUSCULAR; INTRAVENOUS at 04:02

## 2022-02-14 RX ADMIN — GABAPENTIN 100 MG: 100 CAPSULE ORAL at 08:02

## 2022-02-14 NOTE — PROGRESS NOTES
Pharmacokinetic Assessment Follow Up: IV Vancomycin    Therapy with vancomycin complete and/or consult discontinued by provider.  Pharmacy will sign off, please re-consult as needed.    Wisam Ignacio, PharmD, BCPS

## 2022-02-14 NOTE — PLAN OF CARE
Patient AAOx3, no distress noted, respirations even and unlabored, vital signs stable,will continue to monitor. Acceptance of education, consents signed, H/P done. Labs reviewed. Patient in IR Room 188 for bilateral nephrostomy tube change. Patient prepped and draped in sterile fashion.

## 2022-02-14 NOTE — NURSING
END OF SHIFT NOTE  Pt rested well throughout shift, no distress at this time, no complaints, VSS, NPO at MN for nephrostomy tube exchange, illestomy and colostomy in place, and emptied, no acute changes, bed in lowest position, side rails up x2. Bed alarm set, personal items within reach. HERNANDEZ DaveyN RN

## 2022-02-14 NOTE — PROCEDURES
Radiology Post-Procedure Note    Pre Op Diagnosis: cervical cancer, bilateral neph tubes, UTI    Post Op Diagnosis: same    Procedure: bilateral percutaneous nephrostomy tube change    Procedure performed by: Alvarez Ibrahim MD    Written Informed Consent Obtained: Yes    Specimen Removed: YES bilat 10 F neph tubes    Estimated Blood Loss: Minimal    Findings: Local anesthesia and moderate sedation were used.      The indwelling nephrostomy tubes were removed over a wire and a new 10 F drainage catheter was placed under fluoroscopic guidance bilaterally.  The drains were secured in place and dressed.      There were no complications and the patient tolerated the procedure well.  Please see Imaging report for further details.      Alvarez Ibrahim MD  Staff Radiologist  Department of Radiology  Pager: [phone]

## 2022-02-14 NOTE — H&P
Inpatient Radiology Pre-procedure Note    History of Present Illness:  Edita Riley is a 57 y.o. female with bilateral nephrostomy tubes since 2020. She presented to the ED for flank pain ad was found to have UTI. Right tube is putting out less compared to the less. Patient reports this presentation is similar to the previous time she had an obstruction. IR was consulted for nephrostomy tube exchange.     Last time nephrostomy tubes were exchanged was 12/1/21. Next tube exchange was planned for beginning of March.    Admission H&P reviewed.  Past Medical History:   Diagnosis Date    Abnormal mammogram 8/25/2020    Acute deep vein thrombosis (DVT) of lower extremity 12/9/2020    Anxiety     Cervical cancer 2014    Chronic back pain     Depression     Diarrhea due to malabsorption 11/14/2018    DVT of lower extremity, bilateral 11/4/2020    Fibromyalgia     Fungemia 9/27/2020    Generalized abdominal pain 8/25/2020    GERD (gastroesophageal reflux disease)     Hemifacial spasm 9/16/2015    Hiatal hernia 2014    History of cervical cancer 10/11/2018    Hx of psychiatric care     Cymbalta, trazodone    Hypertension     Hypomagnesemia 11/21/2018    Lactose intolerance     Metastatic squamous cell carcinoma to lymph node 10/11/2018    Nephrostomy tube displaced     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 drug 11/14/2018    Osteoarthritis of back     Peritonitis 9/22/2020    Pseudomonas urinary tract infection 4/21/2021    Psychiatric problem     Schatzki's ring 9/14/2015    Seen on outside EGD 05/2014, underwent esophageal dilatation. Bx were negative.     Stroke 1972    Urinary tract infection associated with nephrostomy catheter 6/11/2020    Wound infection after surgery 9/24/2020     Past Surgical History:   Procedure Laterality Date    ANTEGRADE NEPHROSTOGRAPHY Bilateral 9/28/2020    Procedure: Nephrostogram - antegrade;  Surgeon: Leslie Balbuena MD;  Location: [facility] OR [address] [place] [address];   Service: Urology;  Laterality: Bilateral;    ANTEGRADE NEPHROSTOGRAPHY Left 4/20/2021    Procedure: NEPHROSTOGRAM, ANTEGRADE;  Surgeon: Leslie Balbuena MD;  Location: [facility] OR 1ST FLR;  Service: Urology;  Laterality: Left;    BILATERAL OOPHORECTOMY  2015    CHOLECYSTECTOMY  11/09/2016    Done at [facility], path showed chronic cholecystitis and gallstones    cold knife conization  2014    COLECTOMY, RIGHT  9/17/2020    Procedure: COLECTOMY, RIGHT Extended;  Surgeon: Hammad Reynolds MD;  Location: [facility] OR 2ND FLR;  Service: Colon and Rectal;;    COLONOSCOPY  2014    COLONOSCOPY N/A 10/24/2016    at [facility], Dr Gutiérrez    COLONOSCOPY N/A 5/18/2018    Procedure: COLONOSCOPY;  Surgeon: Arden Gutiérrez MD;  Location: [facility] (4TH FLR);  Service: Endoscopy;  Laterality: N/A;    COLONOSCOPY N/A 7/28/2020    Procedure: COLONOSCOPY;  Surgeon: Hammad Reynolds MD;  Location: [facility] (4TH FLR);  Service: Colon and Rectal;  Laterality: N/A;  covid test [place] 7/25    CYSTOSCOPY WITH URETEROSCOPY, RETROGRADE PYELOGRAPHY, AND INSERTION OF STENT Bilateral 3/21/2020    Procedure: CYSTOSCOPY, WITH RETROGRADE PYELOGRAM,;  Surgeon: Leslie Balbuena MD;  [facility];  Service: Urology;  Laterality: Bilateral;    ESOPHAGOGASTRODUODENOSCOPY  2014    ESOPHAGOGASTRODUODENOSCOPY  11/18/2020    ESOPHAGOGASTRODUODENOSCOPY N/A 11/18/2020    Procedure: ESOPHAGOGASTRODUODENOSCOPY (EGD);  Surgeon: Zenon Spencer MD;  Location: [facility];  Service: Endoscopy;  Laterality: N/A;    ESOPHAGOGASTRODUODENOSCOPY N/A 12/11/2020    Procedure: EGD (ESOPHAGOGASTRODUODENOSCOPY);  Surgeon: Juancho Muse MD;  Location: [facility] (2ND FLR);  Service: Endoscopy;  Laterality: N/A;    HYSTERECTOMY  2014    [facility] for cervical cancer    ILEOSTOMY  9/17/2020    Procedure: CREATION, ILEOSTOMY;  Surgeon: Hammad Reynolds MD;  Location: [facility] OR 2ND FLR;  Service: Colon and Rectal;;    LOOPOGRAM N/A 4/20/2021    Procedure:  LOOPOGRAM;  Surgeon: Leslie Balbuena MD;  Location: [facility] OR 1ST FLR;  Service: Urology;  Laterality: N/A;    MOBILIZATION OF SPLENIC FLEXURE N/A 9/11/2020    Procedure: MOBILIZATION, COLONIC;  Surgeon: Hammad Reynolds MD;  Location: [facility] OR 2ND FLR;  Service: Colon and Rectal;  Laterality: N/A;    NEPHROSTOGRAPHY Bilateral 4/17/2021    Procedure: Nephrostogram;  Surgeon: Celeste Surgeon;  Location: [facility];  Service: Anesthesiology;  Laterality: Bilateral;    OOPHORECTOMY      RETROPERITONEAL LYMPHADENECTOMY Right 9/17/2018    Procedure: LYMPHADENECTOMY, RETROPERITONEUM;  Surgeon: Sebas Reed MD;  Location: [facility] OR 2ND FLR;  Service: General;  Laterality: Right;  ILIAC       Review of Systems:   As documented in primary team H&P    Home Meds:   Prior to Admission medications    Medication Sig Start Date End Date Taking? Authorizing Provider   0.9 % sodium chloride (SODIUM CHLORIDE 0.9%) solution Inject into the vein. 9/23/21   Historical Provider   ergocalciferol (ERGOCALCIFEROL) 50,000 unit Cap Take 1 capsule (50,000 Units total) by mouth every 7 days. 9/6/21 9/6/22  Kacey Bergeron MD   gabapentin (NEURONTIN) 100 MG capsule Take 1 capsule (100 mg total) by mouth every evening. 9/6/21   Kacey Bergeron MD   melatonin (MELATIN) 3 mg tablet Take 2 tablets (6 mg total) by mouth nightly as needed for Insomnia. 9/6/21   Kacey Bergeron MD   mirtazapine (REMERON SOL-TAB) 15 MG disintegrating tablet Dissolve 1 tablet (15 mg total) by mouth nightly. Hold while taking Linezolid. 9/17/21 9/17/22  Alexa Barragan MD   pantoprazole (PROTONIX) 40 MG tablet Take 1 tablet (40 mg total) by mouth once daily. 9/6/21 9/6/22  Kacey Bergeron MD   sodium bicarbonate 650 MG tablet Take 2 tablets (1,300 mg total) by mouth 2 (two) times daily. 9/6/21 9/6/22  Kacey Bergeron MD     Scheduled Meds:    cefTRIAXone (ROCEPHIN) IVPB  1 g Intravenous Q24H    enoxaparin  40 mg Subcutaneous Daily    gabapentin  100 mg Oral QHS     mirtazapine  15 mg Oral Nightly    sodium bicarbonate  1,300 mg Oral BID     Continuous Infusions:   PRN Meds:acetaminophen, fentaNYL, melatonin, midazolam, oxyCODONE, oxyCODONE, prochlorperazine, sodium chloride 0.9%  Anticoagulants/Antiplatelets: Lovenox    Allergies:   Review of patient's allergies indicates:   Allergen Reactions    Bee sting [allergen ext-venom-honey bee]      Rash      Grass pollen-bermuda, standard      rash     Sedation Hx: have not been any systemic reactions    Labs:  No results for input(s): INR in the last 168 hours.    Invalid input(s):  PT,  PTT    Recent Labs   Lab 02/14/22  0330   WBC 7.38   HGB 11.0*   HCT 37.1   MCV 91         Recent Labs   Lab 02/12/22  1559 02/13/22  0342 02/14/22  0330   GLU 80   < > 108      < > 143   K 4.2   < > 3.6      < > 113*   CO2 21*   < > 23   BUN 24*   < > 20   CREATININE 1.3   < > 1.3   CALCIUM 9.8   < > 9.2   ALT 21  --   --    AST 20  --   --    ALBUMIN 3.3*  --   --    BILITOT 0.3  --   --     < > = values in this interval not displayed.         Vitals:  Temp: 98.5 °F (36.9 °C) (02/14/22 1142)  Pulse: 77 (02/14/22 1142)  Resp: 18 (02/14/22 0722)  BP: 110/65 (02/14/22 1142)  SpO2: 99 % (02/14/22 1142)     Physical Exam:  ASA: 3  Mallampati: 3    General: no acute distress  Mental Status: alert and oriented to person, place and time  HEENT: normocephalic, atraumatic  Chest: unlabored breathing  Heart: regular heart rate  Abdomen: nondistended  Extremity: moves all extremities    Plan: nephrostomy tube exchange  Sedation Plan: up to moderate        Kris Tai MD  Radiology PGY-2  [facility]

## 2022-02-14 NOTE — PLAN OF CARE
[facility] ([place] [address])  Initial Discharge Assessment       Primary Care Provider: No primary care provider on file.    Admission Diagnosis: Right flank pain [R10.9]  Urinary tract infection without hematuria, site unspecified [N39.0]    Admission Date: 2/12/2022  Expected Discharge Date: 2/15/2022    Discharge Barriers Identified: (P) None    Payor: MEDICAID / Plan: [facility] / Product Type: Managed Medicaid /     Extended Emergency Contact Information  Primary Emergency Contact: oRsemary Hammad  Address: [address] [place] [address]  Home Phone: [phone]  Relation: Spouse  Secondary Emergency Contact: Federico Riley  Mobile Phone: [phone]  Relation: Daughter    Discharge Plan A: (P) Home with family,Home  Discharge Plan B: (P) Home      [id number] - [place], [place] - [address]  [address]  [place] [address]  Phone: [phone] Fax: [phone]    [id number] - [place] - [address]  [address]  [place] [address]  Phone: [phone] Fax: [phone]    [facility]  [address]  [facility] [address]  Phone: [phone] Fax: [phone]      Initial Assessment (most recent)       Adult Discharge Assessment - 02/14/22 1738          Discharge Assessment    Assessment Type Discharge Planning Assessment (P)      Confirmed/corrected address, phone number and insurance Yes (P)      Confirmed Demographics Correct on Facesheet (P)      Source of Information patient (P)    At     Does patient/caregiver understand observation status Yes (P)      Communicated OK with patient/caregiver Yes (P)      Reason For Admission Right flank pain (P)      Lives With spouse (P)      Facility Arrived From: home (P)      Do you expect to return to your current living situation? Yes (P)      Do you have help at home or someone to  help you manage your care at home? Yes (P)      Who are your caregiver(s) and their phone number(s)? ; Hammad Riley; [phone] (P)      Prior to hospitilization cognitive status: Alert/Oriented (P)      Current cognitive status: Alert/Oriented (P)      Walking or Climbing Stairs Difficulty ambulation difficulty, requires equipment (P)      Dressing/Bathing Difficulty none (P)      Home Accessibility wheelchair accessible (P)      Home Layout Able to live on 1st floor (P)      Equipment Currently Used at Home walker, rolling;wheelchair (P)      Readmission within 30 days? No (P)      Patient currently being followed by outpatient case management? No (P)      Do you currently have service(s) that help you manage your care at home? No (P)      Do you take prescription medications? Yes (P)      Do you have prescription coverage? Yes (P)      Do you have any problems affording any of your prescribed medications? No (P)      Is the patient taking medications as prescribed? yes (P)      Who is going to help you get home at discharge?  (P)      How do you get to doctors appointments? family or friend will provide (P)      Are you on dialysis? No (P)      Do you take coumadin? No (P)      Discharge Plan A Home with family;Home (P)      Discharge Plan B Home (P)      DME Needed Upon Discharge  other (see comments) (P)    TBD    Discharge Plan discussed with: Patient;Spouse/sig other (P)    Contacted  to alert pt was in hospital per pt request    Discharge Barriers Identified None (P)                       Laly Bond LMSW  Case Management Social Worker   [facility]

## 2022-02-14 NOTE — CONSULTS
Radiology Consult    IR was consulted for nephrostomy tube exchange.    Edita Riley is a 57 y.o. female with bilateral nephrostomy tubes since 2020. She presented to the ED for flank pain ad was found to have UTI. Right tube is putting out less compared to the less. Patient reports this presentation is similar to the previous time she had an obstruction.    Last time nephrostomy tubes were exchanged was 12/1/21. Next tube exchange was planned for beginning of March.      Past Medical History:   Diagnosis Date    Abnormal mammogram 8/25/2020    Acute deep vein thrombosis (DVT) of lower extremity 12/9/2020    Anxiety     Cervical cancer 2014    Chronic back pain     Depression     Diarrhea due to malabsorption 11/14/2018    DVT of lower extremity, bilateral 11/4/2020    Fibromyalgia     Fungemia 9/27/2020    Generalized abdominal pain 8/25/2020    GERD (gastroesophageal reflux disease)     Hemifacial spasm 9/16/2015    Hiatal hernia 2014    History of cervical cancer 10/11/2018    Hx of psychiatric care     Cymbalta, trazodone    Hypertension     Hypomagnesemia 11/21/2018    Lactose intolerance     Metastatic squamous cell carcinoma to lymph node 10/11/2018    Nephrostomy tube displaced     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 drug 11/14/2018    Osteoarthritis of back     Peritonitis 9/22/2020    Pseudomonas urinary tract infection 4/21/2021    Psychiatric problem     Schatzki's ring 9/14/2015    Seen on outside EGD 05/2014, underwent esophageal dilatation. Bx were negative.     Stroke 1972    Urinary tract infection associated with nephrostomy catheter 6/11/2020    Wound infection after surgery 9/24/2020     Past Surgical History:   Procedure Laterality Date    ANTEGRADE NEPHROSTOGRAPHY Bilateral 9/28/2020    Procedure: Nephrostogram - antegrade;  Surgeon: Leslie Balbuena MD;  Location: [facility] OR [address] [place] [address];  Service: Urology;  Laterality: Bilateral;    ANTEGRADE  NEPHROSTOGRAPHY Left 4/20/2021    Procedure: NEPHROSTOGRAM, ANTEGRADE;  Surgeon: Leslie Balbuena MD;  Location: [facility] OR 1ST FLR;  Service: Urology;  Laterality: Left;    BILATERAL OOPHORECTOMY  2015    CHOLECYSTECTOMY  11/09/2016    Done at [facility], path showed chronic cholecystitis and gallstones    cold knife conization  2014    COLECTOMY, RIGHT  9/17/2020    Procedure: COLECTOMY, RIGHT Extended;  Surgeon: Hammad Reynolds MD;  Location: [facility] OR 2ND FLR;  Service: Colon and Rectal;;    COLONOSCOPY  2014    COLONOSCOPY N/A 10/24/2016    at [facility], Dr Gutiérrez    COLONOSCOPY N/A 5/18/2018    Procedure: COLONOSCOPY;  Surgeon: Arden Gutiérrez MD;  Location: [facility] (4TH FLR);  Service: Endoscopy;  Laterality: N/A;    COLONOSCOPY N/A 7/28/2020    Procedure: COLONOSCOPY;  Surgeon: Hammad Reynolds MD;  Location: [facility] (4TH FLR);  Service: Colon and Rectal;  Laterality: N/A;  covid test [place] 7/25    CYSTOSCOPY WITH URETEROSCOPY, RETROGRADE PYELOGRAPHY, AND INSERTION OF STENT Bilateral 3/21/2020    Procedure: CYSTOSCOPY, WITH RETROGRADE PYELOGRAM,;  Surgeon: Leslie Balbuena MD;  Location: [facility] OR 1ST FLR;  Service: Urology;  Laterality: Bilateral;    ESOPHAGOGASTRODUODENOSCOPY  2014    ESOPHAGOGASTRODUODENOSCOPY  11/18/2020    ESOPHAGOGASTRODUODENOSCOPY N/A 11/18/2020    Procedure: ESOPHAGOGASTRODUODENOSCOPY (EGD);  Surgeon: Zenon Spencer MD;  Location: [facility];  Service: Endoscopy;  Laterality: N/A;    ESOPHAGOGASTRODUODENOSCOPY N/A 12/11/2020    Procedure: EGD (ESOPHAGOGASTRODUODENOSCOPY);  Surgeon: Juancho Muse MD;  Location: [facility] ENDO (2ND FLR);  Service: Endoscopy;  Laterality: N/A;    HYSTERECTOMY  2014    [facility] for cervical cancer    ILEOSTOMY  9/17/2020    Procedure: CREATION, ILEOSTOMY;  Surgeon: Hammad Reynolds MD;  Location: [facility] OR 2ND FLR;  Service: Colon and Rectal;;    LOOPOGRAM N/A 4/20/2021    Procedure: LOOPOGRAM;  Surgeon: Leslie Balbuena MD;  Location: [facility] OR  1ST FLR;  Service: Urology;  Laterality: N/A;    MOBILIZATION OF SPLENIC FLEXURE N/A 9/11/2020    Procedure: MOBILIZATION, COLONIC;  Surgeon: Hammad Reynolds MD;  Location: [facility] OR 2ND FLR;  Service: Colon and Rectal;  Laterality: N/A;    NEPHROSTOGRAPHY Bilateral 4/17/2021    Procedure: Nephrostogram;  Surgeon: Celeste Surgeon;  Location: [facility];  Service: Anesthesiology;  Laterality: Bilateral;    OOPHORECTOMY      RETROPERITONEAL LYMPHADENECTOMY Right 9/17/2018    Procedure: LYMPHADENECTOMY, RETROPERITONEUM;  Surgeon: Sebas Reed MD;  Location: [facility] OR 2ND FLR;  Service: General;  Laterality: Right;  ILIAC       Discussed with primary team, Dr. Diony Ram.    Imaging reviewed with Radiology staff, Dr. Shaw Alcantara.     Procedure: nephrostomy tube exchange    Scheduled Meds:    cefTRIAXone (ROCEPHIN) IVPB  1 g Intravenous Q24H    enoxaparin  40 mg Subcutaneous Daily    gabapentin  100 mg Oral QHS    mirtazapine  15 mg Oral Nightly    sodium bicarbonate  1,300 mg Oral BID     Continuous Infusions:   PRN Meds:acetaminophen, melatonin, oxyCODONE, oxyCODONE, prochlorperazine, sodium chloride 0.9%    Allergies:   Review of patient's allergies indicates:   Allergen Reactions    Bee sting [allergen ext-venom-honey bee]      Rash      Grass pollen-bermuda, standard      rash       Labs:  No results for input(s): INR in the last 168 hours.    Invalid input(s):  PT,  PTT    Recent Labs   Lab 02/14/22  0330   WBC 7.38   HGB 11.0*   HCT 37.1   MCV 91         Recent Labs   Lab 02/12/22  1559 02/13/22  0342 02/14/22  0330   GLU 80   < > 108      < > 143   K 4.2   < > 3.6      < > 113*   CO2 21*   < > 23   BUN 24*   < > 20   CREATININE 1.3   < > 1.3   CALCIUM 9.8   < > 9.2   ALT 21  --   --    AST 20  --   --    ALBUMIN 3.3*  --   --    BILITOT 0.3  --   --     < > = values in this interval not displayed.         Vitals (Most Recent):  Temp: 98.7 °F (37.1 °C) (02/14/22 0722)  Pulse: 80  (02/14/22 0722)  Resp: 18 (02/14/22 0722)  BP: 115/63 (02/14/22 0722)  SpO2: 99 % (02/14/22 0722)    Plan:   Plan for nephrostomy tube exchange  Keep NPO        Kris Tai MD  Radiology PGY-2  [facility]

## 2022-02-14 NOTE — PROGRESS NOTES
Progress Note  [facility] Medicine    Primary Team: [facility]  Admit Date: 2/12/2022   Length of Stay:  LOS: 0 days   SUBJECTIVE:   Reason for Admission:  Pyelonephritis    HPI:  57-year-old female with history of cervical cancer status post surgery and pelvic radiation complicated by ureteral obstruction requiring ileostomy and has had bilateral nephrostomy tubes indwelling since 2020 who presents to the ED for right-sided flank pain.  Pain began abruptly today and feels like she was punched in the side.  She has also had decreased output from that nephrostomy tube, which usually occurs when 1 of her tubes becomes obstructed.  Last nephrostomy exchange was in December, and she is scheduled for another 1 next month.  She still has output from her urostomy.    Hospital Course:  Pt was admitted to  for evaluation of nephrostomy obstruction and concern for pyelonephritis.  She was started on Vanc and Rocephin but vanc was discontinued after UCx growing GNR    Interval history:    Pt reports continued right flank pain though lesser than before and notes both tubes are now draining urine. IR planning for tube exchange today    Review of Systems:  Constitutional: no fever or chills  Respiratory: no cough or shortness of breath  Cardiovascular: no chest pain or palpitations  Gastrointestinal: no nausea or vomiting, no abdominal pain or change in bowel habits  Genitourinary: positive for flank pain; decreased urinary output per tubes, negative for hematuria     OBJECTIVE:     Temp:  [98.5 °F (36.9 °C)-99.6 °F (37.6 °C)]   Pulse:  [77-85]   Resp:  [17-18]   BP: ()/(56-65)   SpO2:  [98 %-100 %]  Body mass index is 22.5 kg/m².  Intake/Outake:  This Shift:  No intake/output data recorded.    Net I/O past 24h:     Intake/Output Summary (Last 24 hours) at 2/14/2022 1550  Last data filed at 2/14/2022 0614  Gross per 24 hour   Intake 120 ml   Output 1100 ml   Net -980 ml             Physical Exam:  Gen- well-developed,  well-nourished, NAD  CVS- S1 and S2 present, RRR  Resp- CTA b/l, no work of breathing  Abd- BS+, soft, +urostomy with urine present, + ileostomy  Back- + R CVA tenderness.  Nephrostomy tubes with clean dressings, urine in both bags ~100-200 cc  Ext- no clubbing, cynaosis, or edema    Laboratory:  CBC/Anemia Labs: Coags:    Recent Labs   Lab 02/12/22  1559 02/13/22  0342 02/14/22  0330   WBC 12.05 9.99 7.38   HGB 12.7 12.0 11.0*   HCT 41.3 41.0 37.1    247 211   MCV 89 92 91   RDW 14.3 14.6* 14.6*    No results for input(s): PT, INR, APTT in the last 168 hours.     Chemistries:   Recent Labs   Lab 02/12/22  1559 02/13/22  0342 02/14/22  0330    143 143   K 4.2 4.0 3.6    112* 113*   CO2 21* 24 23   BUN 24* 18 20   CREATININE 1.3 1.2 1.3   CALCIUM 9.8 9.3 9.2   PROT 7.8  --   --    BILITOT 0.3  --   --    ALKPHOS 141*  --   --    ALT 21  --   --    AST 20  --   --         Medications:  Scheduled Meds:   cefTRIAXone (ROCEPHIN) IVPB  1 g Intravenous Q24H    enoxaparin  40 mg Subcutaneous Daily    gabapentin  100 mg Oral QHS    mirtazapine  15 mg Oral Nightly    sodium bicarbonate  1,300 mg Oral BID                             Continuous Infusions:  PRN Meds:.acetaminophen, melatonin, oxyCODONE, oxyCODONE, prochlorperazine, sodium chloride 0.9%     ASSESSMENT/PLAN:     Active Hospital Problems    Diagnosis  POA    *Pyelonephritis [N12]  Yes    Urinary tract infection associated with nephrostomy catheter [T83.512A, N39.0]  Yes    Presence of urostomy [Z93.6]  Not Applicable    Hydronephrosis s/p bilateral nephrostomy tube [N13.30]  Yes     Chronic      Resolved Hospital Problems   No resolved problems to display.     Pyelonephritis associated with nephrostomy catheter  -NTs are due to be exchanged in 1 month; pt has had recurrent infections associated with NT since placed in 2020  -Order placed for replacement of NT with IR - tentative plan for today, continue NPO  -Culture results reviewed;  these include Klebsiella, E.coli, Enterococcus, Enterobacter, VRE  -Urine Cx growing GNR, vanc stopped, continue rocephin and tailor abx based on species and susceptibilities  -May need ID assistance pending cultures  -NPO past midnight        Presence of urostomy  Hydronephrosis s/p bilateral nephrostomy tube  -US demonstrated increased hydronephrosis on the right consistent with obstruction of nephrostomy tube.    -suspect anastomotic strictures presenting adequate drainage of the ureters   -had loopogram which showed b/l reflux, more sluggish on left than the right  -will need close outpatient f/u with Urology in given last appt 5/2021, for consideration of further planning       DVT ppx- Lovenox  CODE Status- FULL    Dispo- home pending above intervention, PT/OT ordered    Care discussed with Dr Cortez  Attending attestation to follow    Diony Ram MD  Internal Medicine PGY-3

## 2022-02-14 NOTE — CARE UPDATE
Patient fully recovered from sedation and transported back to floor by escort. Report called by Charge nurse.

## 2022-02-15 VITALS
HEART RATE: 74 BPM | WEIGHT: 149 LBS | HEIGHT: 68 IN | RESPIRATION RATE: 18 BRPM | OXYGEN SATURATION: 99 % | BODY MASS INDEX: 22.58 KG/M2 | TEMPERATURE: 99 F | DIASTOLIC BLOOD PRESSURE: 56 MMHG | SYSTOLIC BLOOD PRESSURE: 107 MMHG

## 2022-02-15 LAB
ANION GAP SERPL CALC-SCNC: 8 MMOL/L (ref 8–16)
BASOPHILS # BLD AUTO: 0.03 K/UL (ref 0–0.2)
BASOPHILS NFR BLD: 0.4 % (ref 0–1.9)
BUN SERPL-MCNC: 20 MG/DL (ref 6–20)
CALCIUM SERPL-MCNC: 9.7 MG/DL (ref 8.7–10.5)
CHLORIDE SERPL-SCNC: 112 MMOL/L (ref 95–110)
CO2 SERPL-SCNC: 26 MMOL/L (ref 23–29)
CREAT SERPL-MCNC: 1.3 MG/DL (ref 0.5–1.4)
DIFFERENTIAL METHOD: ABNORMAL
EOSINOPHIL # BLD AUTO: 0.2 K/UL (ref 0–0.5)
EOSINOPHIL NFR BLD: 2.2 % (ref 0–8)
ERYTHROCYTE [DISTWIDTH] IN BLOOD BY AUTOMATED COUNT: 14.5 % (ref 11.5–14.5)
EST. GFR  (AFRICAN AMERICAN): 51.9 ML/MIN/1.73 M^2
EST. GFR  (NON AFRICAN AMERICAN): 45 ML/MIN/1.73 M^2
GLUCOSE SERPL-MCNC: 93 MG/DL (ref 70–110)
HCT VFR BLD AUTO: 38.3 % (ref 37–48.5)
HCV AB SERPL QL IA: NEGATIVE
HGB BLD-MCNC: 11 G/DL (ref 12–16)
HIV 1+2 AB+HIV1 P24 AG SERPL QL IA: NEGATIVE
IMM GRANULOCYTES # BLD AUTO: 0.01 K/UL (ref 0–0.04)
IMM GRANULOCYTES NFR BLD AUTO: 0.1 % (ref 0–0.5)
LYMPHOCYTES # BLD AUTO: 1.7 K/UL (ref 1–4.8)
LYMPHOCYTES NFR BLD: 19.4 % (ref 18–48)
MCH RBC QN AUTO: 27.2 PG (ref 27–31)
MCHC RBC AUTO-ENTMCNC: 28.7 G/DL (ref 32–36)
MCV RBC AUTO: 95 FL (ref 82–98)
MONOCYTES # BLD AUTO: 1.2 K/UL (ref 0.3–1)
MONOCYTES NFR BLD: 13.7 % (ref 4–15)
NEUTROPHILS # BLD AUTO: 5.5 K/UL (ref 1.8–7.7)
NEUTROPHILS NFR BLD: 64.2 % (ref 38–73)
NRBC BLD-RTO: 0 /100 WBC
PLATELET # BLD AUTO: 217 K/UL (ref 150–450)
PMV BLD AUTO: 9.6 FL (ref 9.2–12.9)
POTASSIUM SERPL-SCNC: 3.6 MMOL/L (ref 3.5–5.1)
RBC # BLD AUTO: 4.04 M/UL (ref 4–5.4)
SODIUM SERPL-SCNC: 146 MMOL/L (ref 136–145)
WBC # BLD AUTO: 8.52 K/UL (ref 3.9–12.7)

## 2022-02-15 PROCEDURE — 99238 HOSP IP/OBS DSCHRG MGMT 30/<: CPT | Mod: ,,, | Performed by: HOSPITALIST

## 2022-02-15 PROCEDURE — 85025 COMPLETE CBC W/AUTO DIFF WBC: CPT | Performed by: HOSPITALIST

## 2022-02-15 PROCEDURE — 97161 PT EVAL LOW COMPLEX 20 MIN: CPT

## 2022-02-15 PROCEDURE — 99238 PR HOSPITAL DISCHARGE DAY,<30 MIN: ICD-10-PCS | Mod: ,,, | Performed by: HOSPITALIST

## 2022-02-15 PROCEDURE — 97530 THERAPEUTIC ACTIVITIES: CPT

## 2022-02-15 PROCEDURE — 97116 GAIT TRAINING THERAPY: CPT

## 2022-02-15 PROCEDURE — 63600175 PHARM REV CODE 636 W HCPCS: Performed by: HOSPITALIST

## 2022-02-15 PROCEDURE — 80048 BASIC METABOLIC PNL TOTAL CA: CPT | Performed by: HOSPITALIST

## 2022-02-15 PROCEDURE — 25000003 PHARM REV CODE 250: Performed by: HOSPITALIST

## 2022-02-15 PROCEDURE — 97165 OT EVAL LOW COMPLEX 30 MIN: CPT

## 2022-02-15 PROCEDURE — 97535 SELF CARE MNGMENT TRAINING: CPT

## 2022-02-15 PROCEDURE — 36415 COLL VENOUS BLD VENIPUNCTURE: CPT | Performed by: HOSPITALIST

## 2022-02-15 RX ORDER — MIRTAZAPINE 15 MG/1
15 TABLET, ORALLY DISINTEGRATING ORAL NIGHTLY
Qty: 30 TABLET | Refills: 11 | Status: ON HOLD | OUTPATIENT
Start: 2022-02-15 | End: 2023-05-31

## 2022-02-15 RX ORDER — SODIUM BICARBONATE 650 MG/1
1300 TABLET ORAL 2 TIMES DAILY
Qty: 120 TABLET | Refills: 11 | Status: ON HOLD | OUTPATIENT
Start: 2022-02-15 | End: 2023-04-25 | Stop reason: HOSPADM

## 2022-02-15 RX ORDER — CEFDINIR 300 MG/1
300 CAPSULE ORAL 2 TIMES DAILY
Qty: 20 CAPSULE | Refills: 0 | Status: SHIPPED | OUTPATIENT
Start: 2022-02-16 | End: 2022-02-26

## 2022-02-15 RX ORDER — GABAPENTIN 100 MG/1
100 CAPSULE ORAL NIGHTLY
Qty: 90 CAPSULE | Refills: 3 | Status: ON HOLD | OUTPATIENT
Start: 2022-02-15 | End: 2022-09-01 | Stop reason: SDUPTHER

## 2022-02-15 RX ADMIN — CEFTRIAXONE 1 G: 1 INJECTION, SOLUTION INTRAVENOUS at 06:02

## 2022-02-15 RX ADMIN — SODIUM BICARBONATE 650 MG TABLET 1300 MG: at 08:02

## 2022-02-15 NOTE — NURSING
Patient rested well throughout shift, no distress noted, no complaints of pain or discomfort, VSS, bed in low position, bed rails up x 2, no medication changes this shift. Nephrostomy tube (L/R) clean, dry and intact draining clear, yellow urine, Bed alarm set, personal items within reach. Will continue to monitor.     Juliette MSN, RN

## 2022-02-15 NOTE — NURSING
Patient rested well throughout shift, no distress noted, no complaints, VSS, bed in low position, bed rails up x 2, IVF in progress, no medication changes this shift. Nephrostomy tube (L/R) clean, dry and intact draining clear yellow urine, Bed alarm set, personal items within reach. Will continue to monitor.    Juliette MSN, RN

## 2022-02-15 NOTE — PLAN OF CARE
[facility] - Intensive Care ([facility])  Discharge Final Note    Primary Care Provider: No primary care provider on file.    Expected Discharge Date: 2/15/2022    Final Discharge Note (most recent)       Final Note - 02/15/22 1324          Final Note    Assessment Type Final Discharge Note (P)      Anticipated Discharge Disposition Home or Self Care (P)      Hospital Resources/Appts/Education Provided Provided patient/caregiver with written discharge plan information;Appointments scheduled and added to AVS (P)                      Important Message from Medicare             Contact Info       Rhonda Aguilar MD   Specialty: Family Medicine    [address] [place] [address] [address]  [place] [address]   Phone: [phone]       Next Steps: Follow up on 2/21/2022    Instructions: Scheduled appointment for 3:00pm. Please ensure you bring your discharge summary and medication list.        Patient discharging home today with no post acute needs.        Future Appointments   Date Time Provider Department Center   3/2/2022 10:30 AM [facility] [id number] RAD IR [facility]     Siria Jacobson LMSW  [facility]   [id number]

## 2022-02-15 NOTE — PT/OT/SLP EVAL
"Occupational Therapy   Co-Evaluation/Treatment and Discharge Note    Name: Edita Riley  MRN: [id number]  Admitting Diagnosis:  Pyelonephritis   Recent Surgery: * No surgery found *      Recommendations:     Discharge Recommendations: home  Discharge Equipment Recommendations:  bath bench  Barriers to discharge:  None    Assessment:     Edita Riley is a 57 y.o. female with a medical diagnosis of Pyelonephritis. At this time, patient is functioning at their prior level of function and does not require further acute OT services. Pt demonstrated the ability to perform standing ADLs and LB dressing without the need for assistance this session. Pt with residual effects from previous CVA, but is functioning at her baseline. She will have assistance at home as needed.     Plan:     During this hospitalization, patient does not require further acute OT services.  Please re-consult if situation changes.    · Plan of Care Reviewed with: patient    Subjective     "I haven't used the bathroom in years"     Chief Complaint: pain   Patient/Family Comments/goals: To return home     Occupational Profile:  Living Environment: Pt lives with her  and daughter in an apartment with 4 MANAV and a LHR. She has a tub/shower combo.   Previous level of function: Independent with ADLs and ambulated with a RW   Roles and Routines: Does not drive or work   Equipment Used at home:  walker, rolling,wheelchair  Assistance upon Discharge: Will have assistance from  and daughter, but not 24/7     Pain/Comfort:  · Pain Rating 1: 7/10  · Location - Side 1: Right  · Location - Orientation 1: lower  · Location 1: back  · Pain Addressed 1: Reposition,Distraction  · Pain Rating Post-Intervention 1: other (see comments) (not rated)    Patients cultural, spiritual, Orthodox conflicts given the current situation: no    Objective:     Co-evaluation/treatment performed due to patient's multiple deficits requiring two " skilled therapists to appropriately and safely assess patient's strength and endurance while facilitating functional tasks in addition to accommodating for patient's activity tolerance.       Communicated with: RN prior to session.  Patient found HOB elevated with colostomy,nephrostomy (2 each) upon OT entry to room.    General Precautions: Standard, fall   Orthopedic Precautions:N/A   Braces: N/A  Respiratory Status: Room air     Occupational Performance:    Bed Mobility:    · Patient completed Rolling/Turning to Right with independence  · Patient completed Scooting/Bridging with independence  · Patient completed Supine to Sit with independence    Functional Mobility/Transfers:  · Patient completed Sit <> Stand Transfer with supervision  with  rolling walker   · Functional Mobility: Pt engaging in functional mobility to simulate household/community distances approx 100ft with RW and utilizing supervision in order to maximize functional activity tolerance and standing balance required for engagement in occupations of choice.  · Pt practiced ascending and descending stairs. See PT note.     Activities of Daily Living:  · Grooming: supervision : To perform oral care and wash face in standing at the sink   · Upper Body Dressing: minimum assistance : to don a clean gown around the front   · Toileting: : Pt denied practicing toilet transfer due to N/A. Pt with 2 nephrostomy bags and 2 colostomy bags. Pt reports independence with bag management.      Cognitive/Visual Perceptual:  Cognitive/Psychosocial Skills:     -       Oriented to: Person, Place, Time and Situation   -       Follows Commands/attention:Follows multistep  commands  -       Safety awareness/insight to disability: intact     Physical Exam:    Balance:  Static Sitting   independence   Dynamic Sitting   independence   Static Standing   modified independence   Dynamic Standing   supervision     Upper Extremity Function:   Edema None noted   Sensation    -        Intact   Hand Dominance Right   LUE ROM  WFL   RUE ROM WFL   LUE Strength  WFL   RUE Strength  WFL   LUE  Strength WFL   RUE  Strength  WFL   Fine Motor Skills     -       Intact   Gross Motor skills    WFL     *Pt reports residual RUE weakness from previous stroke, but no deficits noted during evaluation     AMPAC 6 Click ADL:  AMPA Total Score: 24    Treatment & Education:   Therapist provided facilitation and instruction of proper body mechanics and fall prevention strategies during tasks listed above.   Instructed patient to sit in bedside chair daily to increase OOB/activity tolerance.   Instructed patient to use call light to have nursing staff assist with needs/transfers.   Discussed OT POC and answered all questions within OT scope of practice.   Whiteboard updated     Education:    Patient left up in chair with all lines intact and call button in reach    GOALS:   Multidisciplinary Problems     Occupational Therapy Goals        Problem: Occupational Therapy Goal    Goal Priority Disciplines Outcome Interventions   Occupational Therapy Goal     OT, PT/OT Ongoing, Progressing    Description: Pt does not require skilled OT services at this time. Pt is performing all ADLs and functional mobility at [facility]. Please re-consult if any changes.                      History:     Past Medical History:   Diagnosis Date    Abnormal mammogram 8/25/2020    Acute deep vein thrombosis (DVT) of lower extremity 12/9/2020    Anxiety     Cervical cancer 2014    Chronic back pain     Depression     Diarrhea due to malabsorption 11/14/2018    DVT of lower extremity, bilateral 11/4/2020    Fibromyalgia     Fungemia 9/27/2020    Generalized abdominal pain 8/25/2020    GERD (gastroesophageal reflux disease)     Hemifacial spasm 9/16/2015    Hiatal hernia 2014    History of cervical cancer 10/11/2018    Hx of psychiatric care     Cymbalta, trazodone    Hypertension     Hypomagnesemia 11/21/2018     Lactose intolerance     Metastatic squamous cell carcinoma to lymph node 10/11/2018    Nephrostomy tube displaced     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 drug 11/14/2018    Osteoarthritis of back     Peritonitis 9/22/2020    Pseudomonas urinary tract infection 4/21/2021    Psychiatric problem     Schatzki's ring 9/14/2015    Seen on outside EGD 05/2014, underwent esophageal dilatation. Bx were negative.     Stroke 1972    Urinary tract infection associated with nephrostomy catheter 6/11/2020    Wound infection after surgery 9/24/2020       Past Surgical History:   Procedure Laterality Date    ANTEGRADE NEPHROSTOGRAPHY Bilateral 9/28/2020    Procedure: Nephrostogram - antegrade;  Surgeon: Leslie Balbuena MD;  Location: [facility] OR [address] [place] [address];  Service: Urology;  Laterality: Bilateral;    ANTEGRADE NEPHROSTOGRAPHY Left 4/20/2021    Procedure: NEPHROSTOGRAM, ANTEGRADE;  Surgeon: Leslie Balbuena MD;  Location: [facility] OR [address] [place] [address];  Service: Urology;  Laterality: Left;    BILATERAL OOPHORECTOMY  2015    CHOLECYSTECTOMY  11/09/2016    Done at [facility], path showed chronic cholecystitis and gallstones    cold knife conization  2014    COLECTOMY, RIGHT  9/17/2020    Procedure: COLECTOMY, RIGHT Extended;  Surgeon: Hammad Reynolds MD;  [facility];  Service: Colon and Rectal;;    COLONOSCOPY  2014    COLONOSCOPY N/A 10/24/2016    at [facility], Dr Gutiérrez    COLONOSCOPY N/A 5/18/2018    Procedure: COLONOSCOPY;  Surgeon: Arden Gutiérrez MD;  Location: [facility] (4TH FLR);  Service: Endoscopy;  Laterality: N/A;    COLONOSCOPY N/A 7/28/2020    Procedure: COLONOSCOPY;  Surgeon: Hammad Reynolds MD;  Location: [facility] ENDO (4TH FLR);  Service: Colon and Rectal;  Laterality: N/A;  covid test [place] 7/25    CYSTOSCOPY WITH URETEROSCOPY, RETROGRADE PYELOGRAPHY, AND INSERTION OF STENT Bilateral 3/21/2020    Procedure: CYSTOSCOPY, WITH RETROGRADE PYELOGRAM,;  Surgeon: Leslie Balbuena MD;  Location: [facility] OR [facility]  FLR;  Service: Urology;  Laterality: Bilateral;    ESOPHAGOGASTRODUODENOSCOPY  2014    ESOPHAGOGASTRODUODENOSCOPY  11/18/2020    ESOPHAGOGASTRODUODENOSCOPY N/A 11/18/2020    Procedure: ESOPHAGOGASTRODUODENOSCOPY (EGD);  Surgeon: Zenon Spencer MD;  Location: [facility] ENDO;  Service: Endoscopy;  Laterality: N/A;    ESOPHAGOGASTRODUODENOSCOPY N/A 12/11/2020    Procedure: EGD (ESOPHAGOGASTRODUODENOSCOPY);  Surgeon: Juancho Muse MD;  Location: [facility] ENDO (2ND FLR);  Service: Endoscopy;  Laterality: N/A;    HYSTERECTOMY  2014    TV for cervical cancer    ILEOSTOMY  9/17/2020    Procedure: CREATION, ILEOSTOMY;  Surgeon: Hammad Reynolds MD;  Location: [facility] OR 2ND FLR;  Service: Colon and Rectal;;    LOOPOGRAM N/A 4/20/2021    Procedure: LOOPOGRAM;  Surgeon: Leslie Balbuena MD;  Location: [facility] OR 1ST FLR;  Service: Urology;  Laterality: N/A;    MOBILIZATION OF SPLENIC FLEXURE N/A 9/11/2020    Procedure: MOBILIZATION, COLONIC;  Surgeon: Hammad Reynolds MD;  Location: [facility] OR 2ND FLR;  Service: Colon and Rectal;  Laterality: N/A;    NEPHROSTOGRAPHY Bilateral 4/17/2021    Procedure: Nephrostogram;  Surgeon: Celeste Surgeon;  Location: [facility] CELESTE;  Service: Anesthesiology;  Laterality: Bilateral;    OOPHORECTOMY      RETROPERITONEAL LYMPHADENECTOMY Right 9/17/2018    Procedure: LYMPHADENECTOMY, RETROPERITONEUM;  Surgeon: Sebas Reed MD;  Location: [facility] OR 2ND FLR;  Service: General;  Laterality: Right;  ILIAC       Time Tracking:     OT Date of Treatment: 02/15/22  OT Start Time: 1154  OT Stop Time: 1226  OT Total Time (min): 32 min    Billable Minutes:Evaluation 8  Self Care/Home Management 14  Therapeutic Activity 10    2/15/2022

## 2022-02-15 NOTE — PT/OT/SLP EVAL
"Physical Therapy Co-Evaluation and Discharge Note  Co-evaluation with OT for maximal pt participation, safety, and activity tolerance      Patient Name:  Edita Riley   MRN:  [id number]  Admitting Diagnosis:  Pyelonephritis   Recent Surgery: * No surgery found *    Admit Date: 2/12/2022  Length of Stay: 1 days    Recommendations:     Discharge Recommendations:  home   Discharge Equipment Recommendations: bath bench   Barriers to discharge: None    Assessment:     Edtia Riley is a 57 y.o. female admitted with a medical diagnosis of Pyelonephritis. .  At this time, patient is functioning at their prior level of function and does not require further acute PT services. Pt slow but independent with gait with walker and can ascend/descend a flight of stairs with a rail. Pt at [facility] at this time.    Plan:     During this hospitalization, patient does not require further acute PT services.  Please re-consult if situation changes.      Subjective     RN Refugio notified prior to session. No family present upon PT entrance into room.    Chief Complaint: Back pain  Patient/Family Comments/goals: "My  won't answer the phone so I can go home"  Pain/Comfort:  Pain Rating 1: 7/10  Location - Side 1: Right  Location - Orientation 1: lower  Location 1: back ((kindey))  Pain Addressed 1: Distraction    Social History:  Residence: lives with their family  1st level apartment with 4 MANAV and L HR. Pt's bathroom has a tub/shower combo.  Support available: family  Equipment Used: walker, rolling,wheelchair  Equipment Owned (not using): None  Prior level of function: independent (walker for mobility)  Work: Retired.   Drive: no.   Managing Medicines/Managing Home: yes.     Patient reports they will have assistance from  ( and 23y/o twin daughters) upon discharge.    Objective:     Additional staff present: OT Madelin    Patient found supine with  (2x each nephrostomy and colostomy bags) upon PT entry to " "room.    General Precautions: Standard,     Orthopedic Precautions:N/A   Braces: N/A   Body mass index is 22.5 kg/m².  Oxygen Device: Room Air  Vitals: /68 (BP Location: Left arm, Patient Position: Lying)   Pulse 74   Temp 98.6 °F (37 °C) (Oral)   Resp 18   Ht 5' 8.23" (1.733 m)   Wt 67.6 kg (149 lb)   LMP 06/08/2014 (Approximate)   SpO2 99%   BMI 22.50 kg/m²     Exam:  Cognition:   Alert and Cooperative  Command following: Follows multistep verbal commands  Fluency: clear/fluent  Skin Integrity: Visible skin intact  Edema: Mild Rudi LE's  Sensation: Reports decreased sensation to R thigh and inner calf and L outer thigh  Hearing: Intact  Vision:  Intact  Coordination: grossly intact  Range of Motion:  RLE: WFL  LLE: WFL  Strength Exam:  Bilateral Lower Extremity Strength: grossly 4+/5, R hip flexion 4-/5    Outcome Measures:  AM-PAC 6 CLICK MOBILITY  Turning over in bed (including adjusting bedclothes, sheets and blankets)?: 4  Sitting down on and standing up from a chair with arms (e.g., wheelchair, bedside commode, etc.): 4  Moving from lying on back to sitting on the side of the bed?: 4  Moving to and from a bed to a chair (including a wheelchair)?: 4  Need to walk in hospital room?: 4  Climbing 3-5 steps with a railing?: 3  Basic Mobility Total Score: 23     Functional Mobility:  Bed Mobility:   Rolling/Turning to Left: independence  Rolling/Turning to Right: independence  Scooting to HOB via supine bridge: independence  Supine to Sit: independence; from R side of bed  Scooting anteriorly to EOB to have both feet planted on floor: independence  Pt returned to bedside chair    Sitting Balance at Edge of Bed:  Static Sitting Balance: Good : able to maintain balance against moderate resistance  Dynamic Sitting Balance: Good- : able to sit unsupported and weight shift across midline minimally  Assistance Level Required: [place]  Time: 2 min  Postural deviations noted: no deviations " noted    Transfers:   Sit <> Stand Transfer: modified independence with rolling walker   Stand <> Sit Transfer: modified independence with rolling walker   [id number] from EOB  Bed <> Chair Transfer: Step Transfer technique with supervision with rolling walker  Chair on patient's R    Standing Balance:  Static Standing Balance: Fair : able to stand unsupported without UE support and without LOB for 1 minute  Dynamic Standing Balance: Fair : stand independently unsupported, weight shift, and reach ipsilaterally. LOB noted when crossing midline.  Assistance Level Required: Supervision  Patient used: rolling walker   Time: 20 min  Postural deviations noted: no deviations noted  Encouraged: upright stance  Gait:   Patient ambulated: 100ft   Patient required: modified independent  Patient used:  rolling walker   Gait Pattern observed: swing through  Gait Deviation(s): decreased step length and decreased karma  Impairments due to: decreased strength  all lines remained intact throughout ambulation trial    Stairs:  Pt ascended/descended 1 flight(s) with L handrail with Stand-by Assistance.       Education:  Time provided for education, counseling and discussion of health disposition in regards to patient's current status  All questions answered within PT scope of practice and to patient's satisfaction  Pt educated on proper body mechanics, safety techniques, and energy conservation with PT facilitation and cueing throughout session    Patient left up in chair with all lines intact and call button in reach.    GOALS:   Multidisciplinary Problems       Physical Therapy Goals       Not on file                    History:     Past Medical History:   Diagnosis Date    Abnormal mammogram 8/25/2020    Acute deep vein thrombosis (DVT) of lower extremity 12/9/2020    Anxiety     Cervical cancer 2014    Chronic back pain     Depression     Diarrhea due to malabsorption 11/14/2018    DVT of lower extremity, bilateral 11/4/2020     Fibromyalgia     Fungemia 9/27/2020    Generalized abdominal pain 8/25/2020    GERD (gastroesophageal reflux disease)     Hemifacial spasm 9/16/2015    Hiatal hernia 2014    History of cervical cancer 10/11/2018    Hx of psychiatric care     Cymbalta, trazodone    Hypertension     Hypomagnesemia 11/21/2018    Lactose intolerance     Metastatic squamous cell carcinoma to lymph node 10/11/2018    Nephrostomy tube displaced     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 drug 11/14/2018    Osteoarthritis of back     Peritonitis 9/22/2020    Pseudomonas urinary tract infection 4/21/2021    Psychiatric problem     Schatzki's ring 9/14/2015    Seen on outside EGD 05/2014, underwent esophageal dilatation. Bx were negative.     Stroke 1972    Urinary tract infection associated with nephrostomy catheter 6/11/2020    Wound infection after surgery 9/24/2020       Past Surgical History:   Procedure Laterality Date    ANTEGRADE NEPHROSTOGRAPHY Bilateral 9/28/2020    Procedure: Nephrostogram - antegrade;  Surgeon: Leslie Balbuena MD;  Location: [facility] OR 1ST FLR;  Service: Urology;  Laterality: Bilateral;    ANTEGRADE NEPHROSTOGRAPHY Left 4/20/2021    Procedure: NEPHROSTOGRAM, ANTEGRADE;  Surgeon: Leslie Balbuena MD;  Location: [facility] OR 1ST FLR;  Service: Urology;  Laterality: Left;    BILATERAL OOPHORECTOMY  2015    CHOLECYSTECTOMY  11/09/2016    Done at [facility], path showed chronic cholecystitis and gallstones    cold knife conization  2014    COLECTOMY, RIGHT  9/17/2020    Procedure: COLECTOMY, RIGHT Extended;  Surgeon: Hammad Reynolds MD;  Location: [facility] OR 2ND FLR;  Service: Colon and Rectal;;    COLONOSCOPY  2014    [facility]    COLONOSCOPY N/A 5/18/2018    Procedure: COLONOSCOPY;  Surgeon: Arden Gutiérrez MD;  Location: [facility] ENDO (4TH FLR);  Service: Endoscopy;  Laterality: N/A;    COLONOSCOPY N/A 7/28/2020    Procedure: COLONOSCOPY;  Surgeon: Hammad Reynolds MD;  Location: [facility] ENDO (4TH  FLR);  Service: Colon and Rectal;  Laterality: N/A;  [place]    CYSTOSCOPY WITH URETEROSCOPY, RETROGRADE PYELOGRAPHY, AND INSERTION OF STENT Bilateral 3/21/2020    Procedure: CYSTOSCOPY, WITH RETROGRADE PYELOGRAM,;  Surgeon: Leslie Balbuena MD;  Location: [facility] OR 1ST FLR;  Service: Urology;  Laterality: Bilateral;    ESOPHAGOGASTRODUODENOSCOPY  2014    ESOPHAGOGASTRODUODENOSCOPY  11/18/2020    ESOPHAGOGASTRODUODENOSCOPY N/A 11/18/2020    Procedure: ESOPHAGOGASTRODUODENOSCOPY (EGD);  Surgeon: Zenon Spencer MD;  Location: [facility] ENDO;  Service: Endoscopy;  Laterality: N/A;    ESOPHAGOGASTRODUODENOSCOPY N/A 12/11/2020    Procedure: EGD (ESOPHAGOGASTRODUODENOSCOPY);  Surgeon: Juancho Muse MD;  Location: [facility] (2ND FLR);  Service: Endoscopy;  Laterality: N/A;    HYSTERECTOMY  2014    [facility] for cervical cancer    ILEOSTOMY  9/17/2020    Procedure: CREATION, ILEOSTOMY;  Surgeon: Hammad Reynolds MD;  Location: [facility] OR 2ND FLR;  Service: Colon and Rectal;;    LOOPOGRAM N/A 4/20/2021    Procedure: LOOPOGRAM;  Surgeon: Leslie Balbuena MD;  Location: [facility] OR 1ST FLR;  Service: Urology;  Laterality: N/A;    MOBILIZATION OF SPLENIC FLEXURE N/A 9/11/2020    Procedure: MOBILIZATION, COLONIC;  Surgeon: Hammad Reynolds MD;  Location: [facility] OR 2ND FLR;  Service: Colon and Rectal;  Laterality: N/A;    NEPHROSTOGRAPHY Bilateral 4/17/2021    Procedure: Nephrostogram;  Surgeon: Celeste Surgeon;  Location: [facility] CELESTE;  Service: Anesthesiology;  Laterality: Bilateral;    OOPHORECTOMY      RETROPERITONEAL LYMPHADENECTOMY Right 9/17/2018    Procedure: LYMPHADENECTOMY, RETROPERITONEUM;  Surgeon: Sebas Reed MD;  Location: [facility] OR 2ND FLR;  Service: General;  Laterality: Right;  ILIAC       Time Tracking:     PT Received On: 02/15/22  PT Start Time: 1154     PT Stop Time: 1226  PT Total Time (min): 32 min     Billable Minutes: Evaluation 10 min, Gait Training 12 min, and Therapeutic Activity 10 min    Sravan  Andre Parker PT, DPT  2/15/2022

## 2022-02-16 ENCOUNTER — PATIENT OUTREACH (OUTPATIENT)
Dept: ADMINISTRATIVE | Facility: CLINIC | Age: 57
End: 2022-02-16
Payer: MEDICAID

## 2022-02-16 NOTE — HOSPITAL COURSE
Pt was admitted to  for evaluation of nephrostomy obstruction and concern for pyelonephritis.  She was started on Vanc and Rocephin but vanc was discontinued after UCx growing GNR. Bilateral NT were exchanged on 2/14 by IR. Symptoms improved and patient was pain free after exchange. Tubes were draining well. Later Cx speciated as pan sensitive Kleb and thus patient was discharged home on cefdinir for 10 more days with close follow up w PCP and Urology. IR to follow for tube management

## 2022-02-16 NOTE — PROGRESS NOTES
C3 nurse spoke with Edita Riley for a TCC post hospital discharge follow up call. The patient has a scheduled HOSFU appointment with MD Jeff on 2/21/2022 @ 1500 (AVS).  Provider not within [place].

## 2022-02-16 NOTE — PATIENT INSTRUCTIONS
Patient Education       Kidney Infection Discharge Instructions   About this topic   A kidney infection is a kind of urinary tract infection or UTI. Most UTIs are infections in either your bladder or your kidneys. Bladder infections are more common and may also be called cystitis. Kidney infections are more serious and may also be called pyelonephritis. You need antibiotics to treat a UTI. It is important to take all of your antibiotics even if you start to feel better.         What care is needed at home?   · Ask your doctor what you need to do when you go home. Make sure you ask questions if you do not understand what the doctor says.  · For the first day or so, you may want to take an over-the-counter medicine, like phenazopyridine. This will help to numb your bladder. You will also not have the strong urge to urinate. This medicine causes your urine and tears to look orange. If you have kidney disease, talk to your doctor before taking this medicine.  · To lower your chance of getting a UTI in the future, you can:  ? Drink extra fluids.  ? If you have sex, urinate right afterwards.  What follow-up care is needed?   Your doctor may ask you to make visits to the office to check on your progress. Be sure to keep these visits.  What drugs may be needed?   The doctor may order drugs to:  · Help with pain  · Fight an infection  · Help relax the tubes that drain the urine from your body  Be sure to talk to your doctor about all of your drugs if you are pregnant.  Will physical activity be limited?   Physical activities will not be limited. You may have to pass urine more often.  What changes to diet are needed?   · Do not drink beer, wine, and mixed drinks (alcohol) or caffeine. These can bother the bladder.  · Talk to your doctor about drinking cranberry juice.  What can be done to prevent this health problem?   · Pass urine often.  · Wear cotton underwear.  · Women should not wear overly tight underwear or  pants.  · Do not use feminine hygiene sprays or drying soaps.  · Avoid spermicides.  · Gently cleanse your genital area each day. Wipe from front to back to keep germs from going in your body.  · Uncircumcised men should retract their foreskin and gently clean around the head of their penis daily. Then put the foreskin back in place.  · Gently cleanse your genital area before and after having sex.  · Empty your bladder after having sex.  · Empty your bladder before going to sleep.  When do I need to call the doctor?   · You have very bad pain in your back, shoulder, or belly.  · You have a fever of 102.2°F (39°C); shaking chills or sweats even though you are taking antibiotics.  · You have a fever up to 100.4°F (38°C).  · You notice more blood in your urine.  · Your signs get worse or do not improve within 24 hours of starting treatment.  · You are not able to urinate for more than 8 hours.  · Your signs come back after treatment has stopped.  Teach Back: Helping You Understand   The Teach Back Method helps you understand the information we are giving you. After you talk with the staff, tell them in your own words what you learned. This helps to make sure the staff has described each thing clearly. It also helps to explain things that may have been confusing. Before going home, make sure you can do these:  · I can tell you about my condition.  · I can tell you how to prevent this problem from coming back.  · I can tell you what I will do if my signs do not get better after 24 hours of treatment or come back after I have finished treatment.  Where can I learn more?   Centers for Disease Control  https://www.cdc.gov/antibiotic-use/community/for-patients/common-illnesses/uti.html   Health Navigator  https://www.HyglosnaHycretegator.org.nz/health-a-z/p/pyelonephritis/   National [place] of Diabetes and Digestive and Kidney  Diseases  https://www.niddk.nih.gov/health-information/urologic-diseases/kidney-infection-pyelonephritis/all-content   Office on Womens Health  https://www.womenshealth.gov/a-z-topics/urinary-tract-infections   Last Reviewed Date   2021-06-18  Consumer Information Use and Disclaimer   This information is not specific medical advice and does not replace information you receive from your health care provider. This is only a brief summary of general information. It does NOT include all information about conditions, illnesses, injuries, tests, procedures, treatments, therapies, discharge instructions or life-style choices that may apply to you. You must talk with your health care provider for complete information about your health and treatment options. This information should not be used to decide whether or not to accept your health care providers advice, instructions or recommendations. Only your health care provider has the knowledge and training to provide advice that is right for you.  Copyright   Copyright © 2022 UpToDate, Inc. and its affiliates and/or licensors. All rights reserved.  Sa[employer]ia teaching reviewed with Edita Riley . She verbalized understanding.    Education was provided based on the patient's discharge diagnosis using the attached Sa[employer] patient education as a reference.

## 2022-02-16 NOTE — DISCHARGE SUMMARY
[facility] - Int[facility] Care ([place] [address])  [facility] Medicine  Discharge Summary      Patient Name: Edita Riley  MRN: [id number]  Patient Class: IP- Inpatient  Admission Date: 2/12/2022  Hospital Length of Stay: 1 days  Discharge Date and Time:  02/15/2022 6:19 PM  Attending Physician: No att. providers found   Discharging Provider: Diony Ram MD  Primary Care Provider: No primary care provider on file.  Hospital Medicine Team: [facility] MED V Diony Ram MD    HPI:   57-year-old female with history of cervical cancer status post surgery and pelvic radiation complicated by ureteral obstruction requiring ileostomy and has had bilateral nephrostomy tubes indwelling since 2020 who presents to the ED for right-sided flank pain.  Pain began abruptly today and feels like she was punched in the side.  She has also had decreased output from that nephrostomy tube, which usually occurs when 1 of her tubes becomes obstructed.  Last nephrostomy exchange was in December, and she is scheduled for another 1 next month.  She still has output from her urostomy.      * No surgery found *      Hospital Course:   Pt was admitted to  for evaluation of nephrostomy obstruction and concern for pyelonephritis.  She was started on Vanc and Rocephin but vanc was discontinued after UCx growing GNR. Bilateral NT were exchanged on 2/14 by IR. Symptoms improved and patient was pain free after exchange. Tubes were draining well. Later Cx speciated as pan sensitive Kleb and thus patient was discharged home on cefdinir for 10 more days with close follow up w PCP and Urology. IR to follow for tube management       Goals of Care Treatment Preferences:  Code Status: Full Code    Vitals:    02/15/22 0400 02/15/22 0804 02/15/22 1233 02/15/22 1521   BP:  121/63 127/68 (!) 107/56   BP Location:  Left arm Left arm Left arm   Patient Position:  Lying Lying Lying   Pulse: 72 74     Resp: 18 18     Temp: 98.8 °F (37.1 °C) 98.6  °F (37 °C) 98.6 °F (37 °C) 98.8 °F (37.1 °C)   TempSrc: Oral Oral Oral Oral   SpO2: 99% 99%     Weight:       Height:         Physical Exam:  Gen- well-developed, well-nourished, NAD  CVS- S1 and S2 present, RRR  Resp- CTA b/l, no work of breathing  Abd- BS+, soft, +urostomy with urine present, + ileostomy  Back- No tenderness, both NT draining well 300-400 cc bilaterally in the bag.  Ext- no clubbing, cynaosis, or edema     Consults:   Consults (From admission, onward)        Status Ordering Provider     Inpatient consult to Interventional Radiology  Once        Provider:  (Not yet assigned)    FRANKLYN Jaimes          No new Assessment & Plan notes have been filed under this hospital service since the last note was generated.  Service: Hospital Medicine    Final Active Diagnoses:    Diagnosis Date Noted POA    PRINCIPAL PROBLEM:  Pyelonephritis [N12] 06/11/2020 Yes    Urinary tract infection associated with nephrostomy catheter [T83.512A, N39.0] 06/17/2021 Yes    Presence of urostomy [Z93.6] 02/08/2021 Not Applicable    Hydronephrosis s/p bilateral nephrostomy tube [N13.30] 06/11/2020 Yes     Chronic      Problems Resolved During this Admission:       Discharged Condition: fair    Disposition: Home or Self Care    Follow Up:   Follow-up Information     Rhonda Aguilar MD On 2/21/2022.    Specialty: Family Medicine  Why: Scheduled appointment for 3:00pm. Please ensure you bring your discharge summary and medication list.  Contact information:  [address] [place] [address]  [place] [address] [phone]                       Patient Instructions:      Ambulatory referral/consult to Urology   Standing Status: Future   Referral Priority: Routine Referral Type: Consultation   Referral Reason: Specialty Services Required   Requested Specialty: Urology   Number of Visits Requested: 1       Significant Diagnostic Studies: Labs:   BMP:   Recent Labs   Lab 02/14/22  0330 02/15/22  0614    93   NA  143 146*   K 3.6 3.6   * 112*   CO2 23 26   BUN 20 20   CREATININE 1.3 1.3   CALCIUM 9.2 9.7   , CMP   Recent Labs   Lab 02/14/22  0330 02/15/22  0614    146*   K 3.6 3.6   * 112*   CO2 23 26    93   BUN 20 20   CREATININE 1.3 1.3   CALCIUM 9.2 9.7   ANIONGAP 7* 8   ESTGFRAFRICA 51.9* 51.9*   EGFRNONAA 45.0* 45.0*    and CBC   Recent Labs   Lab 02/14/22  0330 02/14/22  0330 02/15/22  0614   WBC 7.38  --  8.52   HGB 11.0*  --  11.0*   HCT 37.1   < > 38.3     --  217    < > = values in this interval not displayed.     Microbiology:   Blood Culture   Lab Results   Component Value Date    LABBLOO No Growth to date 02/12/2022    LABBLOO No Growth to date 02/12/2022    LABBLOO No Growth to date 02/12/2022    and Urine Culture    Lab Results   Component Value Date    LABURIN (A) 02/12/2022     KLEBSIELLA PNEUMONIAE  10,000 - 49,999 cfu/ml  No other significant isolate         Pending Diagnostic Studies:     Procedure Component Value Units Date/Time    Hepatitis C Antibody [[id number]] Collected: 02/12/22 1559    Order Status: Sent Lab Status: In process Updated: 02/12/22 1600    Specimen: Blood          Medications:  Reconciled Home Medications:      Medication List      START taking these medications    cefdinir 300 MG capsule  Commonly known as: OMNICEF  Take 1 capsule (300 mg total) by mouth 2 (two) times daily. for 10 days  Start taking on: February 16, 2022        CONTINUE taking these medications    gabapentin 100 MG capsule  Commonly known as: NEURONTIN  Take 1 capsule (100 mg total) by mouth every evening.     melatonin 3 mg tablet  Commonly known as: MELATIN  Take 2 tablets (6 mg total) by mouth nightly as needed for Insomnia.     mirtazapine 15 MG disintegrating tablet  Commonly known as: REMERON SOL-TAB  Dissolve 1 tablet (15 mg total) by mouth nightly.     pantoprazole 40 MG tablet  Commonly known as: PROTONIX  Take 1 tablet (40 mg total) by mouth once daily.     sodium  bicarbonate 650 MG tablet  Take 2 tablets (1,300 mg total) by mouth 2 (two) times daily.     VITAMIN D2 50,000 unit Cap  Generic drug: ergocalciferol  Take 1 capsule (50,000 Units total) by mouth every 7 days.        STOP taking these medications    sodium chloride 0.9% solution            Indwelling Lines/Drains at time of discharge:   Lines/Drains/Airways     Drain                 Urostomy 09/11/20 ileal conduit  days         Ileostomy 09/18/20  days         Nephrostomy 02/14/22 1647 Left 10 Fr. 1 day         Nephrostomy 02/14/22 1647 Right 10 Fr. 1 day                Time spent on the discharge of patient: 40 minutes         Diony Ram MD  Department of Hospital Medicine  [facility] - Intensive Care ([place])

## 2022-02-17 LAB
BACTERIA BLD CULT: NORMAL
BACTERIA BLD CULT: NORMAL

## 2022-02-23 RX ORDER — MIDAZOLAM HYDROCHLORIDE 1 MG/ML
2 INJECTION INTRAMUSCULAR; INTRAVENOUS ONCE
Status: CANCELLED | OUTPATIENT
Start: 2022-02-23 | End: 2022-02-23

## 2022-02-23 RX ORDER — FENTANYL CITRATE 50 UG/ML
100 INJECTION, SOLUTION INTRAMUSCULAR; INTRAVENOUS ONCE
Status: CANCELLED | OUTPATIENT
Start: 2022-02-23 | End: 2022-02-23

## 2022-02-25 ENCOUNTER — LAB VISIT (OUTPATIENT)
Dept: LAB | Facility: HOSPITAL | Age: 57
End: 2022-02-25
Attending: FAMILY MEDICINE
Payer: MEDICAID

## 2022-02-25 DIAGNOSIS — N13.30 HYDRONEPHROSIS, UNSPECIFIED HYDRONEPHROSIS TYPE: ICD-10-CM

## 2022-02-25 LAB
INR PPP: 1 (ref 0.8–1.2)
PROTHROMBIN TIME: 10 SEC (ref 9–12.5)

## 2022-02-25 PROCEDURE — 36415 COLL VENOUS BLD VENIPUNCTURE: CPT | Performed by: FAMILY MEDICINE

## 2022-02-25 PROCEDURE — 85610 PROTHROMBIN TIME: CPT | Performed by: FAMILY MEDICINE

## 2022-02-28 ENCOUNTER — TELEPHONE (OUTPATIENT)
Dept: INTERVENTIONAL RADIOLOGY/VASCULAR | Facility: HOSPITAL | Age: 57
End: 2022-02-28
Payer: MEDICAID

## 2022-03-02 ENCOUNTER — HOSPITAL ENCOUNTER (OUTPATIENT)
Dept: INTERVENTIONAL RADIOLOGY/VASCULAR | Facility: HOSPITAL | Age: 57
Discharge: HOME | End: 2022-03-02
Attending: PHYSICIAN ASSISTANT
Payer: MEDICAID

## 2022-03-02 VITALS
TEMPERATURE: 98 F | WEIGHT: 149 LBS | DIASTOLIC BLOOD PRESSURE: 56 MMHG | OXYGEN SATURATION: 100 % | SYSTOLIC BLOOD PRESSURE: 97 MMHG | BODY MASS INDEX: 22.07 KG/M2 | RESPIRATION RATE: 12 BRPM | HEART RATE: 73 BPM | HEIGHT: 69 IN

## 2022-03-02 DIAGNOSIS — T83.512D URINARY TRACT INFECTION ASSOCIATED WITH NEPHROSTOMY CATHETER, SUBSEQUENT ENCOUNTER: Primary | ICD-10-CM

## 2022-03-02 DIAGNOSIS — N39.0 URINARY TRACT INFECTION ASSOCIATED WITH NEPHROSTOMY CATHETER, SUBSEQUENT ENCOUNTER: Primary | ICD-10-CM

## 2022-03-02 DIAGNOSIS — N13.30 HYDRONEPHROSIS, UNSPECIFIED HYDRONEPHROSIS TYPE: ICD-10-CM

## 2022-03-02 PROCEDURE — 50435 IR NEPHROSTOGRAM: ICD-10-PCS | Mod: 50,,, | Performed by: RADIOLOGY

## 2022-03-02 PROCEDURE — 63600175 PHARM REV CODE 636 W HCPCS: Performed by: RADIOLOGY

## 2022-03-02 PROCEDURE — 50435 EXCHANGE NEPHROSTOMY CATH: CPT | Mod: 50 | Performed by: RADIOLOGY

## 2022-03-02 PROCEDURE — C1729 CATH, DRAINAGE: HCPCS

## 2022-03-02 PROCEDURE — 25000003 PHARM REV CODE 250: Performed by: RADIOLOGY

## 2022-03-02 PROCEDURE — A4550 SURGICAL TRAYS: HCPCS

## 2022-03-02 PROCEDURE — 25500020 PHARM REV CODE 255: Performed by: PHYSICIAN ASSISTANT

## 2022-03-02 RX ORDER — LIDOCAINE HYDROCHLORIDE 10 MG/ML
1 INJECTION, SOLUTION EPIDURAL; INFILTRATION; INTRACAUDAL; PERINEURAL ONCE
Status: DISCONTINUED | OUTPATIENT
Start: 2022-03-02 | End: 2022-03-03 | Stop reason: HOSPADM

## 2022-03-02 RX ORDER — MIDAZOLAM HYDROCHLORIDE 1 MG/ML
INJECTION INTRAMUSCULAR; INTRAVENOUS CODE/TRAUMA/SEDATION MEDICATION
Status: COMPLETED | OUTPATIENT
Start: 2022-03-02 | End: 2022-03-02

## 2022-03-02 RX ORDER — LIDOCAINE HYDROCHLORIDE 10 MG/ML
INJECTION INFILTRATION; PERINEURAL CODE/TRAUMA/SEDATION MEDICATION
Status: COMPLETED | OUTPATIENT
Start: 2022-03-02 | End: 2022-03-02

## 2022-03-02 RX ORDER — SODIUM CHLORIDE 9 MG/ML
INJECTION, SOLUTION INTRAVENOUS CONTINUOUS
Status: DISCONTINUED | OUTPATIENT
Start: 2022-03-02 | End: 2022-03-03 | Stop reason: HOSPADM

## 2022-03-02 RX ORDER — FENTANYL CITRATE 50 UG/ML
INJECTION, SOLUTION INTRAMUSCULAR; INTRAVENOUS CODE/TRAUMA/SEDATION MEDICATION
Status: COMPLETED | OUTPATIENT
Start: 2022-03-02 | End: 2022-03-02

## 2022-03-02 RX ORDER — ONDANSETRON 2 MG/ML
4 INJECTION INTRAMUSCULAR; INTRAVENOUS EVERY 6 HOURS PRN
Status: DISCONTINUED | OUTPATIENT
Start: 2022-03-02 | End: 2022-03-03 | Stop reason: HOSPADM

## 2022-03-02 RX ADMIN — FENTANYL CITRATE 25 MCG: 50 INJECTION, SOLUTION INTRAMUSCULAR; INTRAVENOUS at 11:03

## 2022-03-02 RX ADMIN — IOHEXOL 16 ML: 300 INJECTION, SOLUTION INTRAVENOUS at 12:03

## 2022-03-02 RX ADMIN — MIDAZOLAM HYDROCHLORIDE 1 MG: 1 INJECTION INTRAMUSCULAR; INTRAVENOUS at 11:03

## 2022-03-02 RX ADMIN — LIDOCAINE HYDROCHLORIDE 5 ML: 10 INJECTION, SOLUTION INFILTRATION; PERINEURAL at 11:03

## 2022-03-02 RX ADMIN — CEFTRIAXONE SODIUM 1 G: 1 INJECTION, SOLUTION INTRAVENOUS at 11:03

## 2022-03-02 RX ADMIN — MIDAZOLAM HYDROCHLORIDE 0.5 MG: 1 INJECTION INTRAMUSCULAR; INTRAVENOUS at 11:03

## 2022-03-02 RX ADMIN — FENTANYL CITRATE 50 MCG: 50 INJECTION, SOLUTION INTRAMUSCULAR; INTRAVENOUS at 11:03

## 2022-03-02 NOTE — PLAN OF CARE
Pt arrived to  for nephrostogram with tube exchange. Pt oriented to unit and staff. Plan of care reviewed with patient. Comfort measures utilized. Pt safely transferred from stretcher to procedural table. Safety strap applied, positioner pillows utilized to minimize pressure points. Blankets applied. Patient placed on continuous monitoring. RN to remain at bedside. Education accepted. Consents reviewed. See flow sheets for monitoring, medication administration, and updates.

## 2022-03-02 NOTE — PROCEDURES
Radiology Post-Procedure Note    Pre Op Diagnosis: Hydronephrosis    Post Op Diagnosis: Same    Procedure: Nephrostomy tube exchange and upsize    Procedure performed by: Shaw Alcantara MD    Written Informed Consent Obtained: Yes  Specimen Removed: NO  Estimated Blood Loss: Minimal    Findings:   Bilateral nephrostomy tubes exchanged and upsized for 12 Fr APDL. No complications. See dictation. Exchange in 2 months.     Patient tolerated procedure well.    Shaw Alcantara M.D.  Interventional Radiology  Department of Radiology  Pager: [phone]

## 2022-03-02 NOTE — DISCHARGE INSTRUCTIONS
For scheduling: Call at [phone]    For questions or concerns call: DIANA MON-FRI 8 AM- 5PM [phone]. Radiology resident on call [phone].    For immediate concerns that are not emergent, you may call our radiology clinic at: [phone]

## 2022-03-02 NOTE — DISCHARGE SUMMARY
Radiology Discharge Summary      Hospital Course: No complications    Admit Date: 3/2/2022  Discharge Date: 03/02/2022     Instructions Given to Patient: Yes  Diet: Resume prior diet  Activity: activity as tolerated and no driving for today    Description of Condition on Discharge: Stable  Vital Signs (Most Recent): Temp: 99.1 °F (37.3 °C) (03/02/22 0931)  Pulse: 86 (03/02/22 1134)  Resp: 17 (03/02/22 1134)  BP: 127/64 (03/02/22 1134)  SpO2: 100 % (03/02/22 1134)    Discharge Disposition: Home    Discharge Diagnosis: Hydronephrosis s/p bilateral nephrostomy tube exchange    Follow up: As scheduled    Shaw Alcantara M.D.  Interventional Radiology  Department of Radiology  Pager: [phone]

## 2022-03-02 NOTE — CARE UPDATE
Patient fully recovered from procedure and states full understanding of discharge. SL removed without s/s of bleeding or hematoma. Patient left via wheelchair for [facility].

## 2022-03-02 NOTE — PLAN OF CARE
Patient arrived to room. PIV placed. Admit assessment completed. Plan of care discussed with patient. Nurse call bell within reach.  Will monitor

## 2022-03-02 NOTE — H&P
Radiology History & Physical      SUBJECTIVE:     History of Present Illness:  Edita Riley is a 57 y.o. female w/ pmhx of DVT, anxiety, cervical cancer, fibromyalgia, GERD, HTN, and prior placement of bilateral nephrostomy tubes (2020). She presents for bilateral nephrostomy tube exchange and possible upsizing.   Past Medical History:   Diagnosis Date    Abnormal mammogram 8/25/2020    Acute deep vein thrombosis (DVT) of lower extremity 12/9/2020    Anxiety     Cervical cancer 2014    Chronic back pain     Depression     Diarrhea due to malabsorption 11/14/2018    DVT of lower extremity, bilateral 11/4/2020    Fibromyalgia     Fungemia 9/27/2020    Generalized abdominal pain 8/25/2020    GERD (gastroesophageal reflux disease)     Hemifacial spasm 9/16/2015    Hiatal hernia 2014    History of cervical cancer 10/11/2018    Hx of psychiatric care     Cymbalta, trazodone    Hypertension     Hypomagnesemia 11/21/2018    Lactose intolerance     Metastatic squamous cell carcinoma to lymph node 10/11/2018    Nephrostomy tube displaced     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 drug 11/14/2018    Osteoarthritis of back     Peritonitis 9/22/2020    Pseudomonas urinary tract infection 4/21/2021    Psychiatric problem     Schatzki's ring 9/14/2015    Seen on outside EGD 05/2014, underwent esophageal dilatation. Bx were negative.     Stroke 1972    Urinary tract infection associated with nephrostomy catheter 6/11/2020    Wound infection after surgery 9/24/2020     Past Surgical History:   Procedure Laterality Date    ANTEGRADE NEPHROSTOGRAPHY Bilateral 9/28/2020    Procedure: Nephrostogram - antegrade;  Surgeon: Leslie Balbuena MD;  Location: [facility] OR [address] [place] [address];  Service: Urology;  Laterality: Bilateral;    ANTEGRADE NEPHROSTOGRAPHY Left 4/20/2021    Procedure: NEPHROSTOGRAM, ANTEGRADE;  Surgeon: Leslie Balbuena MD;  Location: [facility] OR [address] [place] [address];  Service: Urology;  Laterality: Left;     BILATERAL OOPHORECTOMY  2015    CHOLECYSTECTOMY  11/09/2016    Done at [facility], path showed chronic cholecystitis and gallstones    cold knife conization  2014    COLECTOMY, RIGHT  9/17/2020    Procedure: COLECTOMY, RIGHT Extended;  Surgeon: Hammad Reynolds MD;  Location: [facility] OR 2ND FLR;  Service: Colon and Rectal;;    COLONOSCOPY  2014    [facility]    COLONOSCOPY N/A 5/18/2018    Procedure: COLONOSCOPY;  Surgeon: Arden Gutiérrez MD;  Location: [facility] (4TH FLR);  Service: Endoscopy;  Laterality: N/A;    COLONOSCOPY N/A 7/28/2020    Procedure: COLONOSCOPY;  Surgeon: Hammad Reynolds MD;  Location: [facility] (4TH FLR);  Service: Colon and Rectal;  Laterality: N/A;  covid test [place] 7/25    CYSTOSCOPY WITH URETEROSCOPY, RETROGRADE PYELOGRAPHY, AND INSERTION OF STENT Bilateral 3/21/2020    Procedure: CYSTOSCOPY, WITH RETROGRADE PYELOGRAM,;  Surgeon: Leslie Balbuena MD;  Location: [facility] OR 1ST FLR;  Service: Urology;  Laterality: Bilateral;    ESOPHAGOGASTRODUODENOSCOPY  2014    ESOPHAGOGASTRODUODENOSCOPY  11/18/2020    ESOPHAGOGASTRODUODENOSCOPY N/A 11/18/2020    Procedure: ESOPHAGOGASTRODUODENOSCOPY (EGD);  Surgeon: Zenon Spencer MD;  Location: [facility];  Service: Endoscopy;  Laterality: N/A;    ESOPHAGOGASTRODUODENOSCOPY N/A 12/11/2020    Procedure: EGD (ESOPHAGOGASTRODUODENOSCOPY);  Surgeon: Juancho Muse MD;  Location: [facility] (2ND FLR);  Service: Endoscopy;  Laterality: N/A;    HYSTERECTOMY  2014    [facility] for cervical cancer    ILEOSTOMY  9/17/2020    Procedure: CREATION, ILEOSTOMY;  Surgeon: Hammad Reynolds MD;  Location: [facility] OR 2ND FLR;  Service: Colon and Rectal;;    LOOPOGRAM N/A 4/20/2021    Procedure: LOOPOGRAM;  Surgeon: Leslie Balbuena MD;  Location: [facility] OR 1ST FLR;  Service: Urology;  Laterality: N/A;    MOBILIZATION OF SPLENIC FLEXURE N/A 9/11/2020    Procedure: MOBILIZATION, COLONIC;  Surgeon: Hammad Reynolds MD;  [facility];  Service: Colon and Rectal;  Laterality: N/A;    NEPHROSTOGRAPHY Bilateral 4/17/2021    Procedure: Nephrostogram;  Surgeon: Celeste Surgeon;  Location: [facility];  Service: Anesthesiology;  Laterality: Bilateral;    OOPHORECTOMY      RETROPERITONEAL LYMPHADENECTOMY Right 9/17/2018    Procedure: LYMPHADENECTOMY, RETROPERITONEUM;  Surgeon: Sebas Reed MD;  [facility];  Service: General;  Laterality: Right;  ILIAC       Home Meds:   Prior to Admission medications    Medication Sig Start Date End Date Taking? Authorizing Provider   gabapentin (NEURONTIN) 100 MG capsule Take 1 capsule (100 mg total) by mouth every evening. 2/15/22  Yes Diony Ram MD   melatonin (MELATIN) 3 mg tablet Take 2 tablets (6 mg total) by mouth nightly as needed for Insomnia. 9/6/21  Yes Kacey Bergeron MD   mirtazapine (REMERON SOL-TAB) 15 MG disintegrating tablet Dissolve 1 tablet (15 mg total) by mouth nightly. 2/15/22 2/15/23 Yes Diony Ram MD   pantoprazole (PROTONIX) 40 MG tablet Take 1 tablet (40 mg total) by mouth once daily. 9/6/21 9/6/22 Yes Kacey Bergeron MD   sodium bicarbonate 650 MG tablet Take 2 tablets (1,300 mg total) by mouth 2 (two) times daily. 2/15/22 2/15/23 Yes Diony Ram MD   ergocalciferol (ERGOCALCIFEROL) 50,000 unit Cap Take 1 capsule (50,000 Units total) by mouth every 7 days. 9/6/21 9/6/22  Kacey Bergeron MD     Anticoagulants/Antiplatelets: no anticoagulation    Allergies:   Review of patient's allergies indicates:   Allergen Reactions    Bee sting [allergen ext-venom-honey bee]      Rash      Grass pollen-bermuda, standard      rash       Labs:  Lab Results   Component Value Date    INR 1.0 02/25/2022       Lab Results   Component Value Date    WBC 8.52 02/15/2022    HGB 11.0 (L) 02/15/2022    HCT 38.3 02/15/2022    MCV 95 02/15/2022     02/15/2022     Lab Results   Component Value Date    GLU 93 02/15/2022     (H) 02/15/2022     K 3.6 02/15/2022     (H) 02/15/2022    CO2 26 02/15/2022    BUN 20 02/15/2022    CREATININE 1.3 02/15/2022    CALCIUM 9.7 02/15/2022    MG 1.8 09/17/2021    ALT 21 02/12/2022    AST 20 02/12/2022    ALBUMIN 3.3 (L) 02/12/2022    BILITOT 0.3 02/12/2022    BILIDIR 0.2 12/04/2020       Review of Systems:   Hematological: no known coagulopathies  Respiratory: no shortness of breath  Cardiovascular: no chest pain  Gastrointestinal: no abdominal pain  Genito-Urinary: no dysuria  Musculoskeletal: negative  Neurological: no TIA or stroke symptoms         OBJECTIVE:     Vital Signs (Most Recent)  Temp: 99.1 °F (37.3 °C) (03/02/22 0931)  Pulse: 84 (03/02/22 0931)  Resp: 20 (03/02/22 0931)  BP: 114/67 (03/02/22 0931)  SpO2: 100 % (03/02/22 0931)    Physical Exam:  ASA: 2  Mallampati: 2    General: no acute distress  Mental Status: alert and oriented to person, place and time  HEENT: normocephalic, atraumatic  Chest: unlabored breathing  Heart: regular heart rate  Abdomen: nondistended  Extremity: moves all extremities    ASSESSMENT/PLAN:     Sedation Plan: up to moderate sedation.   Patient will undergo bilateral nephrostomy tube exchange and possible upsizing.    Betty Sapp       family/patient

## 2022-04-25 RX ORDER — MIDAZOLAM HYDROCHLORIDE 1 MG/ML
2 INJECTION INTRAMUSCULAR; INTRAVENOUS ONCE
Status: CANCELLED | OUTPATIENT
Start: 2022-04-25 | End: 2022-04-25

## 2022-04-25 RX ORDER — FENTANYL CITRATE 50 UG/ML
100 INJECTION, SOLUTION INTRAMUSCULAR; INTRAVENOUS ONCE
Status: CANCELLED | OUTPATIENT
Start: 2022-04-25 | End: 2022-04-25

## 2022-04-29 ENCOUNTER — TELEPHONE (OUTPATIENT)
Dept: INTERVENTIONAL RADIOLOGY/VASCULAR | Facility: HOSPITAL | Age: 57
End: 2022-04-29
Payer: MEDICAID

## 2022-05-02 ENCOUNTER — HOSPITAL ENCOUNTER (OUTPATIENT)
Dept: INTERVENTIONAL RADIOLOGY/VASCULAR | Facility: HOSPITAL | Age: 57
Discharge: HOME | End: 2022-05-02
Attending: RADIOLOGY
Payer: MEDICAID

## 2022-05-02 DIAGNOSIS — T83.512D URINARY TRACT INFECTION ASSOCIATED WITH NEPHROSTOMY CATHETER, SUBSEQUENT ENCOUNTER: ICD-10-CM

## 2022-05-02 DIAGNOSIS — N13.30 HYDRONEPHROSIS, UNSPECIFIED HYDRONEPHROSIS TYPE: Primary | Chronic | ICD-10-CM

## 2022-05-02 DIAGNOSIS — N39.0 URINARY TRACT INFECTION ASSOCIATED WITH NEPHROSTOMY CATHETER, SUBSEQUENT ENCOUNTER: ICD-10-CM

## 2022-05-02 PROCEDURE — C1729 CATH, DRAINAGE: HCPCS

## 2022-05-02 PROCEDURE — A4550 SURGICAL TRAYS: HCPCS

## 2022-05-02 PROCEDURE — 50435 IR NEPHROSTOMY TUBE CHANGE: ICD-10-PCS | Mod: 50,,, | Performed by: RADIOLOGY

## 2022-05-02 PROCEDURE — 50435 EXCHANGE NEPHROSTOMY CATH: CPT | Mod: 50 | Performed by: RADIOLOGY

## 2022-05-02 PROCEDURE — 25000003 PHARM REV CODE 250: Performed by: RADIOLOGY

## 2022-05-02 PROCEDURE — 63600175 PHARM REV CODE 636 W HCPCS: Performed by: RADIOLOGY

## 2022-05-02 RX ORDER — SODIUM CHLORIDE 9 MG/ML
INJECTION, SOLUTION INTRAVENOUS CONTINUOUS
Status: DISCONTINUED | OUTPATIENT
Start: 2022-05-02 | End: 2022-05-03 | Stop reason: HOSPADM

## 2022-05-02 RX ORDER — LIDOCAINE HYDROCHLORIDE 10 MG/ML
1 INJECTION, SOLUTION EPIDURAL; INFILTRATION; INTRACAUDAL; PERINEURAL ONCE
Status: DISCONTINUED | OUTPATIENT
Start: 2022-05-02 | End: 2022-05-03 | Stop reason: HOSPADM

## 2022-05-02 RX ORDER — FENTANYL CITRATE 50 UG/ML
INJECTION, SOLUTION INTRAMUSCULAR; INTRAVENOUS CODE/TRAUMA/SEDATION MEDICATION
Status: COMPLETED | OUTPATIENT
Start: 2022-05-02 | End: 2022-05-02

## 2022-05-02 RX ORDER — LIDOCAINE HYDROCHLORIDE 10 MG/ML
INJECTION INFILTRATION; PERINEURAL CODE/TRAUMA/SEDATION MEDICATION
Status: COMPLETED | OUTPATIENT
Start: 2022-05-02 | End: 2022-05-02

## 2022-05-02 RX ORDER — MIDAZOLAM HYDROCHLORIDE 1 MG/ML
INJECTION INTRAMUSCULAR; INTRAVENOUS CODE/TRAUMA/SEDATION MEDICATION
Status: COMPLETED | OUTPATIENT
Start: 2022-05-02 | End: 2022-05-02

## 2022-05-02 RX ADMIN — MIDAZOLAM HYDROCHLORIDE 1 MG: 1 INJECTION INTRAMUSCULAR; INTRAVENOUS at 02:05

## 2022-05-02 RX ADMIN — CEFTRIAXONE SODIUM 1 G: 1 INJECTION, SOLUTION INTRAVENOUS at 02:05

## 2022-05-02 RX ADMIN — FENTANYL CITRATE 50 MCG: 50 INJECTION, SOLUTION INTRAMUSCULAR; INTRAVENOUS at 02:05

## 2022-05-02 RX ADMIN — LIDOCAINE HYDROCHLORIDE 5 ML: 10 INJECTION, SOLUTION INFILTRATION; PERINEURAL at 02:05

## 2022-05-02 NOTE — DISCHARGE INSTRUCTIONS
"For scheduling call Kathleen at [phone].    For questions or concerns call DIANA Monday thru Friday 8 am- 5 pm at [phone].   **After hours and weekends call [phone] and ask for "Radiology Resident on Call."    For immediate concerns that are non emergent, you may call our Radiology Clinic at [phone].   "

## 2022-05-02 NOTE — PLAN OF CARE
Pt arrived to  for bilateral nephrostomy tube exchange. Pt oriented to unit and staff. Plan of care reviewed with patient. Comfort measures utilized. Pt safely transferred from stretcher to procedural table. Safety strap applied, positioner pillows utilized to minimize pressure points. Blankets applied. Patient placed on continuous monitoring. RN to remain at bedside. Education accepted. Consents reviewed. See flow sheets for monitoring, medication administration, and updates.

## 2022-05-02 NOTE — PLAN OF CARE
Received pt to floor from home.  AAO x 4. Denies pain or discomfort. Respirations even and unlabored. No distress noted. Pt stable.  Admit assessment complete. IV x 1 placed.  Pt oriented to room and call bell placed within reach.  Will continue to monitor.

## 2022-05-02 NOTE — CARE UPDATE
patient arrived to MPU 6 via stretcher in [facility], report received from AMY Koch, see chart for vital signs and assessment, will continue to monitor until recovery complete.

## 2022-05-02 NOTE — H&P
Radiology History & Physical      SUBJECTIVE:     Chief Complaint: here for routine B PCN exchange    History of Present Illness:  Edita Riley is a 57 y.o. female with cervical cancer s/p pelvic XRT c/b B ureteral strictures. B PCN last exchanged 3/2/22.    Past Medical History:   Diagnosis Date    Abnormal mammogram 8/25/2020    Acute deep vein thrombosis (DVT) of lower extremity 12/9/2020    Anxiety     Cervical cancer 2014    Chronic back pain     Depression     Diarrhea due to malabsorption 11/14/2018    DVT of lower extremity, bilateral 11/4/2020    Fibromyalgia     Fungemia 9/27/2020    Generalized abdominal pain 8/25/2020    GERD (gastroesophageal reflux disease)     Hemifacial spasm 9/16/2015    Hiatal hernia 2014    History of cervical cancer 10/11/2018    Hx of psychiatric care     Cymbalta, trazodone    Hypertension     Hypomagnesemia 11/21/2018    Lactose intolerance     Metastatic squamous cell carcinoma to lymph node 10/11/2018    Nephrostomy tube displaced     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 drug 11/14/2018    Osteoarthritis of back     Peritonitis 9/22/2020    Pseudomonas urinary tract infection 4/21/2021    Psychiatric problem     Schatzki's ring 9/14/2015    Seen on outside EGD 05/2014, underwent esophageal dilatation. Bx were negative.     Stroke 1972    Urinary tract infection associated with nephrostomy catheter 6/11/2020    Wound infection after surgery 9/24/2020     Past Surgical History:   Procedure Laterality Date    ANTEGRADE NEPHROSTOGRAPHY Bilateral 9/28/2020    Procedure: Nephrostogram - antegrade;  Surgeon: Leslie Balbuena MD;  Location: [address];  Service: Urology;  Laterality: Bilateral;    ANTEGRADE NEPHROSTOGRAPHY Left 4/20/2021    Procedure: NEPHROSTOGRAM, ANTEGRADE;  Surgeon: Leslie Balbuena MD;  Location: [facility] OR [address] [place] [address];  Service: Urology;  Laterality: Left;    BILATERAL OOPHORECTOMY  2015    CHOLECYSTECTOMY   11/09/2016    Done at [facility], path showed chronic cholecystitis and gallstones    cold knife conization  2014    COLECTOMY, RIGHT  9/17/2020    Procedure: COLECTOMY, RIGHT Extended;  Surgeon: Hammad Reynolds MD;  Location: [facility] OR 2ND FLR;  Service: Colon and Rectal;;    COLONOSCOPY  2014    [facility]    COLONOSCOPY N/A 5/18/2018    Procedure: COLONOSCOPY;  Surgeon: Arden Gutiérrez MD;  Location: [facility] (4TH FLR);  Service: Endoscopy;  Laterality: N/A;    COLONOSCOPY N/A 7/28/2020    Procedure: COLONOSCOPY;  Surgeon: Hammad Reynolds MD;  Location: [facility] (4TH FLR);  Service: Colon and Rectal;  Laterality: N/A;  [place]    CYSTOSCOPY WITH URETEROSCOPY, RETROGRADE PYELOGRAPHY, AND INSERTION OF STENT Bilateral 3/21/2020    Procedure: CYSTOSCOPY, WITH RETROGRADE PYELOGRAM,;  Surgeon: Leslie Balbuena MD;  Location: [facility] OR 1ST FLR;  Service: Urology;  Laterality: Bilateral;    ESOPHAGOGASTRODUODENOSCOPY  2014    ESOPHAGOGASTRODUODENOSCOPY  11/18/2020    ESOPHAGOGASTRODUODENOSCOPY N/A 11/18/2020    Procedure: ESOPHAGOGASTRODUODENOSCOPY (EGD);  Surgeon: Zenon Spencer MD;  Location: [facility];  Service: Endoscopy;  Laterality: N/A;    ESOPHAGOGASTRODUODENOSCOPY N/A 12/11/2020    Procedure: EGD (ESOPHAGOGASTRODUODENOSCOPY);  Surgeon: Juancho Muse MD;  Location: [facility] (2ND FLR);  Service: Endoscopy;  Laterality: N/A;    HYSTERECTOMY  2014    [facility] for cervical cancer    ILEOSTOMY  9/17/2020    Procedure: CREATION, ILEOSTOMY;  Surgeon: Hammad Reynolds MD;  Location: [facility] OR 2ND FLR;  Service: Colon and Rectal;;    LOOPOGRAM N/A 4/20/2021    Procedure: LOOPOGRAM;  Surgeon: Leslie Balbuena MD;  Location: [facility] OR 1ST FLR;  Service: Urology;  Laterality: N/A;    MOBILIZATION OF SPLENIC FLEXURE N/A 9/11/2020    Procedure: MOBILIZATION, COLONIC;  Surgeon: Hammad Reynolds MD;  Location: [facility] OR 2ND FLR;  Service: Colon and Rectal;   Laterality: N/A;    NEPHROSTOGRAPHY Bilateral 4/17/2021    Procedure: Nephrostogram;  Surgeon: Celeste Surgeon;  Location: [facility];  Service: Anesthesiology;  Laterality: Bilateral;    OOPHORECTOMY      RETROPERITONEAL LYMPHADENECTOMY Right 9/17/2018    Procedure: LYMPHADENECTOMY, RETROPERITONEUM;  Surgeon: Sebas Reed MD;  Location: [facility] OR [address] [place] [address];  Service: General;  Laterality: Right;  ILIAC       Home Meds:   Prior to Admission medications    Medication Sig Start Date End Date Taking? Authorizing Provider   ergocalciferol (ERGOCALCIFEROL) 50,000 unit Cap Take 1 capsule (50,000 Units total) by mouth every 7 days. 9/6/21 9/6/22 Yes Kacey Bergeron MD   gabapentin (NEURONTIN) 100 MG capsule Take 1 capsule (100 mg total) by mouth every evening. 2/15/22  Yes Diony Ram MD   melatonin (MELATIN) 3 mg tablet Take 2 tablets (6 mg total) by mouth nightly as needed for Insomnia. 9/6/21  Yes Kacey Bergeron MD   mirtazapine (REMERON SOL-TAB) 15 MG disintegrating tablet Dissolve 1 tablet (15 mg total) by mouth nightly. 2/15/22 2/15/23 Yes Diony Ram MD   pantoprazole (PROTONIX) 40 MG tablet Take 1 tablet (40 mg total) by mouth once daily. 9/6/21 9/6/22 Yes Kacey Bergeron MD   sodium bicarbonate 650 MG tablet Take 2 tablets (1,300 mg total) by mouth 2 (two) times daily. 2/15/22 2/15/23 Yes Diony Ram MD     Anticoagulants/Antiplatelets: no anticoagulation    Allergies:   Review of patient's allergies indicates:   Allergen Reactions    Bee sting [allergen ext-venom-honey bee]      Rash      Grass pollen-bermuda, standard      rash     Sedation History:  no adverse reactions    Review of Systems:   Hematological: no known coagulopathies  Respiratory: no shortness of breath  Cardiovascular: no chest pain  Gastrointestinal: no abdominal pain  Genito-Urinary: no dysuria  Musculoskeletal: negative  Neurological: no TIA or stroke symptoms         OBJECTIVE:     Vital Signs (Most  Recent)  Temp: 99 °F (37.2 °C) (05/02/22 1012)  Pulse: 75 (05/02/22 1012)  Resp: 16 (05/02/22 1012)  BP: (!) 113/57 (05/02/22 1012)  SpO2: 100 % (05/02/22 1012)    Physical Exam:  ASA: 3  Mallampati: 2    General: no acute distress  Mental Status: alert and oriented to person, place and time  HEENT: normocephalic, atraumatic  Chest: unlabored breathing  Heart: regular heart rate  Abdomen: nondistended  Extremity: moves all extremities    Laboratory  Lab Results   Component Value Date    INR 0.9 05/02/2022       Lab Results   Component Value Date    WBC 9.33 05/02/2022    HGB 12.7 05/02/2022    HCT 41.2 05/02/2022    MCV 88 05/02/2022     05/02/2022      Lab Results   Component Value Date     05/02/2022     05/02/2022    K 3.2 (L) 05/02/2022     (H) 05/02/2022    CO2 14 (L) 05/02/2022    BUN 27 (H) 05/02/2022    CREATININE 2.2 (H) 05/02/2022    CALCIUM 9.8 05/02/2022    MG 1.8 09/17/2021    ALT 14 05/02/2022    AST 13 05/02/2022    ALBUMIN 3.5 05/02/2022    BILITOT 0.2 05/02/2022    BILIDIR 0.2 12/04/2020       ASSESSMENT/PLAN:     Sedation Plan: up to moderate    Patient will undergo routine B PCN exchange.    Bro Brambila MD  PGY-3  RADIOLOGY

## 2022-05-03 VITALS
WEIGHT: 150 LBS | HEART RATE: 75 BPM | RESPIRATION RATE: 11 BRPM | BODY MASS INDEX: 22.22 KG/M2 | HEIGHT: 69 IN | OXYGEN SATURATION: 100 % | DIASTOLIC BLOOD PRESSURE: 65 MMHG | TEMPERATURE: 98 F | SYSTOLIC BLOOD PRESSURE: 129 MMHG

## 2022-05-03 NOTE — CARE UPDATE
1 hour post procedure monitoring complete at this time, dressing to left and right lower back remain CDI, discharge instructions reviewed with patient, educational handouts/AVS also provided for reference, IV to right FA removed without difficulty, catheter tip intact, patient transported to [facility] via wheelchair in [facility].

## 2022-08-01 NOTE — NURSING
01/03/2019  Nurse: Carmen Carrillo    Procedure: Vaginal Cylinder    1350: Patient arrived from Home.  Time out performed.    1358: Positioned on Stretcher in the Frog Leg position. Positioned with Knee Immobilizer by myself .    1359: 2.5 cm vaginal cylinder inserted.  HDR treatment given with full bladder.  Pt. Tolerated well.   no

## 2022-08-04 NOTE — PROCEDURES
"Edita Riley is a 55 y.o. female patient.    Temp: 97.9 °F (36.6 °C) (09/17/20 0900)  Pulse: 102 (09/17/20 0900)  Resp: 20 (09/17/20 0900)  BP: 131/65 (09/17/20 0900)  SpO2: 97 % (09/17/20 0900)  Weight: 88.9 kg (196 lb) (09/16/20 2300)  Height: 5' 9" (175.3 cm) (09/16/20 2300)    PICC  Date/Time: 9/17/2020 9:50 AM  Performed by: Derek Mitchell RN  Assisting provider: Vanda Daniels LPN  Consent Done: Yes  Time out: Immediately prior to procedure a time out was called to verify the correct patient, procedure, equipment, support staff and site/side marked as required  Indications: med administration and vascular access  Anesthesia: local infiltration  Local anesthetic: lidocaine 1% without epinephrine  Anesthetic Total (mL): 2  Preparation: skin prepped with ChloraPrep  Skin prep agent dried: skin prep agent completely dried prior to procedure  Sterile barriers: all five maximum sterile barriers used - cap, mask, sterile gown, sterile gloves, and large sterile sheet  Hand hygiene: hand hygiene performed prior to central venous catheter insertion  Location details: left cephalic  Catheter type: double lumen  Catheter size: 5 Fr  Catheter Length: 39cm    Ultrasound guidance: yes  Vessel Caliber: medium and patent, compressibility normal  Vascular Doppler: not done  Needle advanced into vessel with real time Ultrasound guidance.  Guidewire confirmed in vessel.  Image recorded and saved.  Sterile sheath used.  Number of attempts: 2  Post-procedure: blood return through all ports, chlorhexidine patch and sterile dressing applied  Technical procedures used: 3CG  Implants: No  Assessment: placement verified by x-ray  Complications: other  Other complications: attempted R arm, unable to get guide wire pass armpit. attempted L arm and successfully placed PICC.          Vanda Daniels  9/17/2020    " Marilou Arora MD: The history, relevant review of systems, past medical and surgical history, medical decision making, and physical examination was documented by the scribe in my presence and I attest to the accuracy of the documentation.

## 2022-08-24 ENCOUNTER — HOSPITAL ENCOUNTER (EMERGENCY)
Facility: HOSPITAL | Age: 57
Discharge: HOME | End: 2022-08-24
Attending: STUDENT IN AN ORGANIZED HEALTH CARE EDUCATION/TRAINING PROGRAM
Payer: MEDICAID

## 2022-08-24 VITALS
RESPIRATION RATE: 16 BRPM | HEIGHT: 69 IN | SYSTOLIC BLOOD PRESSURE: 109 MMHG | DIASTOLIC BLOOD PRESSURE: 57 MMHG | WEIGHT: 175 LBS | OXYGEN SATURATION: 99 % | HEART RATE: 79 BPM | BODY MASS INDEX: 25.92 KG/M2 | TEMPERATURE: 98 F

## 2022-08-24 DIAGNOSIS — N12 PYELONEPHRITIS: Primary | ICD-10-CM

## 2022-08-24 DIAGNOSIS — R10.9 LEFT FLANK PAIN: ICD-10-CM

## 2022-08-24 LAB
ALBUMIN SERPL BCP-MCNC: 3.6 G/DL (ref 3.5–5.2)
ALP SERPL-CCNC: 158 U/L (ref 55–135)
ALT SERPL W/O P-5'-P-CCNC: 21 U/L (ref 10–44)
ANION GAP SERPL CALC-SCNC: 11 MMOL/L (ref 8–16)
AST SERPL-CCNC: 19 U/L (ref 10–40)
BACTERIA #/AREA URNS AUTO: ABNORMAL /HPF
BASOPHILS # BLD AUTO: 0.04 K/UL (ref 0–0.2)
BASOPHILS NFR BLD: 0.3 % (ref 0–1.9)
BILIRUB SERPL-MCNC: 0.3 MG/DL (ref 0.1–1)
BILIRUB UR QL STRIP: NEGATIVE
BUN SERPL-MCNC: 28 MG/DL (ref 6–20)
CALCIUM SERPL-MCNC: 9.7 MG/DL (ref 8.7–10.5)
CHLORIDE SERPL-SCNC: 112 MMOL/L (ref 95–110)
CLARITY UR REFRACT.AUTO: ABNORMAL
CO2 SERPL-SCNC: 18 MMOL/L (ref 23–29)
COLOR UR AUTO: ABNORMAL
CREAT SERPL-MCNC: 1.9 MG/DL (ref 0.5–1.4)
DIFFERENTIAL METHOD: ABNORMAL
EOSINOPHIL # BLD AUTO: 0.1 K/UL (ref 0–0.5)
EOSINOPHIL NFR BLD: 0.7 % (ref 0–8)
ERYTHROCYTE [DISTWIDTH] IN BLOOD BY AUTOMATED COUNT: 13.9 % (ref 11.5–14.5)
EST. GFR  (NO RACE VARIABLE): 30 ML/MIN/1.73 M^2
GLUCOSE SERPL-MCNC: 107 MG/DL (ref 70–110)
GLUCOSE UR QL STRIP: NEGATIVE
HCT VFR BLD AUTO: 42 % (ref 37–48.5)
HGB BLD-MCNC: 12.7 G/DL (ref 12–16)
HGB UR QL STRIP: NEGATIVE
HYALINE CASTS UR QL AUTO: 3 /LPF
IMM GRANULOCYTES # BLD AUTO: 0.06 K/UL (ref 0–0.04)
IMM GRANULOCYTES NFR BLD AUTO: 0.5 % (ref 0–0.5)
KETONES UR QL STRIP: NEGATIVE
LEUKOCYTE ESTERASE UR QL STRIP: ABNORMAL
LYMPHOCYTES # BLD AUTO: 1.3 K/UL (ref 1–4.8)
LYMPHOCYTES NFR BLD: 10 % (ref 18–48)
MCH RBC QN AUTO: 27.6 PG (ref 27–31)
MCHC RBC AUTO-ENTMCNC: 30.2 G/DL (ref 32–36)
MCV RBC AUTO: 91 FL (ref 82–98)
MICROSCOPIC COMMENT: ABNORMAL
MONOCYTES # BLD AUTO: 1.2 K/UL (ref 0.3–1)
MONOCYTES NFR BLD: 8.7 % (ref 4–15)
NEUTROPHILS # BLD AUTO: 10.7 K/UL (ref 1.8–7.7)
NEUTROPHILS NFR BLD: 79.8 % (ref 38–73)
NITRITE UR QL STRIP: NEGATIVE
NRBC BLD-RTO: 0 /100 WBC
PH UR STRIP: >8 [PH] (ref 5–8)
PLATELET # BLD AUTO: 259 K/UL (ref 150–450)
PMV BLD AUTO: 10.6 FL (ref 9.2–12.9)
POTASSIUM SERPL-SCNC: 4.5 MMOL/L (ref 3.5–5.1)
PROT SERPL-MCNC: 8.2 G/DL (ref 6–8.4)
PROT UR QL STRIP: ABNORMAL
RBC # BLD AUTO: 4.6 M/UL (ref 4–5.4)
RBC #/AREA URNS AUTO: 1 /HPF (ref 0–4)
SODIUM SERPL-SCNC: 141 MMOL/L (ref 136–145)
SP GR UR STRIP: 1.02 (ref 1–1.03)
SQUAMOUS #/AREA URNS AUTO: 0 /HPF
TRI-PHOS CRY UR QL COMP ASSIST: ABNORMAL
URN SPEC COLLECT METH UR: ABNORMAL
WBC # BLD AUTO: 13.33 K/UL (ref 3.9–12.7)
WBC #/AREA URNS AUTO: 2 /HPF (ref 0–5)

## 2022-08-24 PROCEDURE — 99284 PR EMERGENCY DEPT VISIT,LEVEL IV: ICD-10-PCS | Mod: ,,, | Performed by: PHYSICIAN ASSISTANT

## 2022-08-24 PROCEDURE — 96376 TX/PRO/DX INJ SAME DRUG ADON: CPT

## 2022-08-24 PROCEDURE — 87088 URINE BACTERIA CULTURE: CPT | Performed by: EMERGENCY MEDICINE

## 2022-08-24 PROCEDURE — 96375 TX/PRO/DX INJ NEW DRUG ADDON: CPT

## 2022-08-24 PROCEDURE — 96374 THER/PROPH/DIAG INJ IV PUSH: CPT

## 2022-08-24 PROCEDURE — 96361 HYDRATE IV INFUSION ADD-ON: CPT

## 2022-08-24 PROCEDURE — 80053 COMPREHEN METABOLIC PANEL: CPT | Performed by: PHYSICIAN ASSISTANT

## 2022-08-24 PROCEDURE — 87077 CULTURE AEROBIC IDENTIFY: CPT | Performed by: EMERGENCY MEDICINE

## 2022-08-24 PROCEDURE — 85025 COMPLETE CBC W/AUTO DIFF WBC: CPT | Performed by: PHYSICIAN ASSISTANT

## 2022-08-24 PROCEDURE — 87186 SC STD MICRODIL/AGAR DIL: CPT | Performed by: EMERGENCY MEDICINE

## 2022-08-24 PROCEDURE — 99285 EMERGENCY DEPT VISIT HI MDM: CPT | Mod: 25

## 2022-08-24 PROCEDURE — 63600175 PHARM REV CODE 636 W HCPCS: Performed by: PHYSICIAN ASSISTANT

## 2022-08-24 PROCEDURE — 87086 URINE CULTURE/COLONY COUNT: CPT | Performed by: EMERGENCY MEDICINE

## 2022-08-24 PROCEDURE — 81001 URINALYSIS AUTO W/SCOPE: CPT | Performed by: PHYSICIAN ASSISTANT

## 2022-08-24 PROCEDURE — 99284 EMERGENCY DEPT VISIT MOD MDM: CPT | Mod: ,,, | Performed by: PHYSICIAN ASSISTANT

## 2022-08-24 RX ORDER — ONDANSETRON 2 MG/ML
4 INJECTION INTRAMUSCULAR; INTRAVENOUS
Status: COMPLETED | OUTPATIENT
Start: 2022-08-24 | End: 2022-08-24

## 2022-08-24 RX ORDER — MORPHINE SULFATE 4 MG/ML
4 INJECTION, SOLUTION INTRAMUSCULAR; INTRAVENOUS
Status: COMPLETED | OUTPATIENT
Start: 2022-08-24 | End: 2022-08-24

## 2022-08-24 RX ORDER — CEFDINIR 300 MG/1
300 CAPSULE ORAL 2 TIMES DAILY
Qty: 20 CAPSULE | Refills: 0 | Status: ON HOLD | OUTPATIENT
Start: 2022-08-24 | End: 2022-09-01 | Stop reason: HOSPADM

## 2022-08-24 RX ADMIN — MORPHINE SULFATE 4 MG: 4 INJECTION INTRAVENOUS at 10:08

## 2022-08-24 RX ADMIN — ONDANSETRON 4 MG: 2 INJECTION INTRAMUSCULAR; INTRAVENOUS at 08:08

## 2022-08-24 RX ADMIN — MORPHINE SULFATE 4 MG: 4 INJECTION INTRAVENOUS at 08:08

## 2022-08-24 NOTE — ED NOTES
"C/o "pain at site where the nephrostomy tubes are".  Left sided pain is worse than right.  N/v all night.  Has ileostomy.  Denies fever. C/o abdominal pain.   "

## 2022-08-24 NOTE — ED PROVIDER NOTES
Encounter Date: 8/24/2022       History     Chief Complaint   Patient presents with    Vomiting     Pt c/o back pain & vomiting.  Pt states pain is around bilateral nephrostomy tubes     This is a 57 year old female with a PMH of HTN, history of cervical cancer s/p surgery and pelvic radiation complicated by ureteral obstruction requiring ileostomy/bilateral nephrostomy tubes presenting to the ED with a chief complaint of abdominal pain. She states around 3am she started with left flank pain described as feeling like someone threw a brick at her. The pain is around the left nephrostomy tube. The pain is constant and 10/10. She reports nausea/vomiting. She denies fever, chills, chest pain, SOB, changes in urine our ostomy output. Has a hx of recurrent urinary tract infections and pyelonephritis.        Review of patient's allergies indicates:   Allergen Reactions    Bee sting [allergen ext-venom-honey bee]      Rash      Grass pollen-bermuda, standard      rash     Past Medical History:   Diagnosis Date    Abnormal mammogram 8/25/2020    Acute deep vein thrombosis (DVT) of lower extremity 12/9/2020    Anxiety     Cervical cancer 2014    Chronic back pain     Depression     Diarrhea due to malabsorption 11/14/2018    DVT of lower extremity, bilateral 11/4/2020    Fibromyalgia     Fungemia 9/27/2020    Generalized abdominal pain 8/25/2020    GERD (gastroesophageal reflux disease)     Hemifacial spasm 9/16/2015    Hiatal hernia 2014    History of cervical cancer 10/11/2018    Hx of psychiatric care     Cymbalta, trazodone    Hypertension     Hypomagnesemia 11/21/2018    Lactose intolerance     Metastatic squamous cell carcinoma to lymph node 10/11/2018    Nephrostomy tube displaced     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 drug 11/14/2018    Osteoarthritis of back     Peritonitis 9/22/2020    Pseudomonas urinary tract infection 4/21/2021    Psychiatric problem     Schatzki's ring 9/14/2015     Seen on outside EGD 05/2014, underwent esophageal dilatation. Bx were negative.     Stroke 1972    Urinary tract infection associated with nephrostomy catheter 6/11/2020    Wound infection after surgery 9/24/2020     Past Surgical History:   Procedure Laterality Date    ANTEGRADE NEPHROSTOGRAPHY Bilateral 9/28/2020    Procedure: Nephrostogram - antegrade;  Surgeon: Leslie Balbuena MD;  [facility];  Service: Urology;  Laterality: Bilateral;    ANTEGRADE NEPHROSTOGRAPHY Left 4/20/2021    Procedure: NEPHROSTOGRAM, ANTEGRADE;  Surgeon: Leslie Balbuena MD;  [facility];  Service: Urology;  Laterality: Left;    BILATERAL OOPHORECTOMY  2015    CHOLECYSTECTOMY  11/09/2016    Done at [facility], path showed chronic cholecystitis and gallstones    cold knife conization  2014    COLECTOMY, RIGHT  9/17/2020    Procedure: COLECTOMY, RIGHT Extended;  Surgeon: Hammad Reynolds MD;  Location: [facility] OR 2ND FLR;  Service: Colon and Rectal;;    COLONOSCOPY  2014    COLONOSCOPY N/A 10/24/2016    at [facility], Dr Gutiérrez    COLONOSCOPY N/A 5/18/2018    Procedure: COLONOSCOPY;  Surgeon: Arden Gutiérrez MD;  Location: [facility] (4TH FLR);  Service: Endoscopy;  Laterality: N/A;    COLONOSCOPY N/A 7/28/2020    Procedure: COLONOSCOPY;  Surgeon: Hammad Reynolds MD;  Location: [facility] (4TH FLR);  Service: Colon and Rectal;  Laterality: N/A;  covid test [place] 7/25    CYSTOSCOPY WITH URETEROSCOPY, RETROGRADE PYELOGRAPHY, AND INSERTION OF STENT Bilateral 3/21/2020    Procedure: CYSTOSCOPY, WITH RETROGRADE PYELOGRAM,;  Surgeon: Leslie Balbuena MD;  [facility];  Service: Urology;  Laterality: Bilateral;    ESOPHAGOGASTRODUODENOSCOPY  2014    ESOPHAGOGASTRODUODENOSCOPY  11/18/2020    ESOPHAGOGASTRODUODENOSCOPY N/A 11/18/2020    Procedure: ESOPHAGOGASTRODUODENOSCOPY (EGD);  Surgeon: Zenon Spencer MD;  Location: [facility];  Service: Endoscopy;  Laterality: N/A;     ESOPHAGOGASTRODUODENOSCOPY N/A 12/11/2020    Procedure: EGD (ESOPHAGOGASTRODUODENOSCOPY);  Surgeon: Juancho Muse MD;  Location: [facility] ENDO (2ND FLR);  Service: Endoscopy;  Laterality: N/A;    HYSTERECTOMY  2014    [facility] for cervical cancer    ILEOSTOMY  9/17/2020    Procedure: CREATION, ILEOSTOMY;  Surgeon: Hammad Reynolds MD;  [facility] F[facility];  Service: Colon and Rectal;;    LOOPOGRAM N/A 4/20/2021    Procedure: LOOPOGRAM;  Surgeon: Leslie Balbuena MD;  [facility] 1ST FLR;  Service: Urology;  Laterality: N/A;    MOBILIZATION OF SPLENIC FLEXURE N/A 9/11/2020    Procedure: MOBILIZATION, COLONIC;  Surgeon: Hammad Reynolds MD;  Location: [facility] OR 2ND FLR;  Service: Colon and Rectal;  Laterality: N/A;    NEPHROSTOGRAPHY Bilateral 4/17/2021    Procedure: Nephrostogram;  Surgeon: Celeste Surgeon;  Location: [facility];  Service: Anesthesiology;  Laterality: Bilateral;    OOPHORECTOMY      RETROPERITONEAL LYMPHADENECTOMY Right 9/17/2018    Procedure: LYMPHADENECTOMY, RETROPERITONEUM;  Surgeon: Sebas Reed MD;  [facility] 2ND FLR;  Service: General;  Laterality: Right;  ILIAC     Family History   Problem Relation Age of Onset    Heart disease Sister     Heart disease Maternal Grandmother     Colon cancer Father     Esophageal cancer Father     Cancer Father         Lung-smoker    Cancer Mother         Cervical    Cervical cancer Mother     Breast cancer Maternal Aunt     Suicide Daughter         jumped from parking structure    Drug abuse Daughter     Drug abuse Daughter     Rectal cancer Neg Hx     Stomach cancer Neg Hx     Crohn's disease Neg Hx     Ulcerative colitis Neg Hx     Diabetes Neg Hx     Hypertension Neg Hx      Social History     Tobacco Use    Smoking status: Former Smoker    Smokeless tobacco: Never Used   Substance Use Topics    Alcohol use: No     Alcohol/week: 0.0 standard drinks    Drug use: No     Review of Systems   Constitutional: Negative for  chills and fever.   HENT: Negative for sore throat.    Respiratory: Negative for cough and shortness of breath.    Cardiovascular: Negative for chest pain.   Gastrointestinal: Positive for abdominal pain, nausea and vomiting.   Genitourinary: Positive for flank pain.   Musculoskeletal: Positive for back pain.   Skin: Negative for rash.   Neurological: Positive for weakness.   Hematological: Does not bruise/bleed easily.       Physical Exam     Initial Vitals   BP Pulse Resp Temp SpO2   08/24/22 0658 08/24/22 0658 08/24/22 0658 08/24/22 0722 08/24/22 0658   136/89 101 20 98.2 °F (36.8 °C) 98 %      MAP       --                Physical Exam    Constitutional: She appears well-developed and well-nourished. She appears distressed.   HENT:   Head: Atraumatic.   Eyes: Conjunctivae and EOM are normal. Pupils are equal, round, and reactive to light.   Cardiovascular: Normal rate, regular rhythm and normal heart sounds.   Pulmonary/Chest: Breath sounds normal. No respiratory distress. She has no wheezes. She has no rhonchi. She has no rales.   Abdominal: Abdomen is soft. Bowel sounds are normal. There is generalized abdominal tenderness.   Writhing in pain, unable to find position of comfort.  Bilateral nephrostomy tubes without surrounding skin changes. Cloudy to blood tinged urine in bags.         Neurological: She is alert and oriented to person, place, and time.   Skin: Skin is warm and dry. No rash noted.         ED Course   Procedures  Labs Reviewed   CBC W/ AUTO DIFFERENTIAL - Abnormal; Notable for the following components:       Result Value    WBC 13.33 (*)     MCHC 30.2 (*)     Gran # (ANC) 10.7 (*)     Immature Grans (Abs) 0.06 (*)     Mono # 1.2 (*)     Gran % 79.8 (*)     Lymph % 10.0 (*)     All other components within normal limits   COMPREHENSIVE METABOLIC PANEL - Abnormal; Notable for the following components:    Chloride 112 (*)     CO2 18 (*)     BUN 28 (*)     Creatinine 1.9 (*)     Alkaline Phosphatase  158 (*)     eGFR 30.0 (*)     All other components within normal limits   URINALYSIS, REFLEX TO URINE CULTURE - Abnormal; Notable for the following components:    Appearance, UA Cloudy (*)     pH, UA >8.0 (*)     Protein, UA 3+ (*)     Leukocytes, UA 2+ (*)     All other components within normal limits    Narrative:     Specimen Source->Urine   URINALYSIS MICROSCOPIC - Abnormal; Notable for the following components:    Bacteria Many (*)     Hyaline Casts, UA 3 (*)     All other components within normal limits    Narrative:     Specimen Source->Urine   CULTURE, URINE          Imaging Results          CT Abdomen Pelvis  Without Contrast (Final result)  Result time 08/24/22 11:30:15   Procedure changed from CT Abdomen Pelvis With Contrast     Final result by John Mejia Jr., MD (08/24/22 11:30:15)                 Impression:      1. Postoperative change of colon conduit urinary diversion with left lower quadrant urostomy and bilateral nephrostomy tubes in place.  2. Right nephrolithiasis and new 4 mm stone within the proximal right ureter.  No right hydronephrosis.  Prominent/postsurgical change of the right ureter, similar to prior.  3. Persistent moderate left hydronephrosis and punctate focus of air as above.  4. Additional findings as above.  5. Right pulmonary micro nodules.  The 4 mm pleural base nodule is more conspicuous.  Attention on follow-up.    Electronically signed by resident: Bro Brambila  Date:    08/24/2022  Time:    10:16    Electronically signed by: John Mejia MD  Date:    08/24/2022  Time:    11:30             Narrative:    EXAMINATION:  CT ABDOMEN PELVIS WITHOUT CONTRAST    CLINICAL HISTORY:  UTI, recurrent/complicated (Female);    TECHNIQUE:  Axial images of the abdomen and pelvis were acquired without the use of IV contrast.  Coronal and sagittal reconstructions were also obtained    COMPARISON:  IR nephrostomy tube change 05/02/2022    CT abdomen pelvis 06/17/2021    FINDINGS:  Heart:  Normal in size. No pericardial effusion.    Lungs: Visualized portions of the lungs are clear without consolidation.  Few scattered bands of subsegmental atelectasis.  4 mm right middle lobe nodule series 2, image 4 similar to December 2020.  Additional 4 mm right pleural base nodule slightly more conspicuous compared to prior.    Liver: Normal in size and contour.  No focal hepatic lesion.    Gallbladder: Surgically absent.    Bile Ducts: No evidence of dilated ducts.    Pancreas: No mass or peripancreatic fat stranding.    Spleen: Unremarkable.    Stomach and duodenum: Unremarkable.    Adrenals: Unremarkable.    Kidneys/Ureters: Postoperative change of colon conduit urinary diversion with left lower quadrant urostomy.  Bilateral nephrostomy tubes in place.  Kidneys normal in size and location.  Right renal stones measuring 4 mm and 7 mm.  No right hydronephrosis.  New 4 mm stone within the proximal right ureter (axial series 2, image 96).  Mild prominence/postsurgical change of the right ureter, similar to prior.  Moderate left hydronephrosis and wall thickening of the left collecting system/ureter, similar to prior.  Punctate focus of air in the left collecting system (axial series 2, image 73).  No left nephrolithiasis or ureteral stones.    Bladder: Not distended.    Reproductive organs: Uterus surgically absent.    Bowel/Mesentery: Small bowel is normal in caliber with no evidence of obstruction. No evidence of inflammation or wall thickening.  Postoperative change of right hemicolectomy with ileostomy in the right lower quadrant.  Long Charlotte's pouch relatively decompressed with small amount of high density material at its proximal end.  Colon demonstrates no focal wall thickening.    Peritoneum: No intraperitoneal free air or fluid.    Lymph nodes: No retroperitoneal lymphadenopathy.  Scattered para-aortic nodes noted similar.    Vasculature: No aneurysm. No significant calcific  atherosclerosis.    Abdominal wall:  No parastomal hernias.  No fluid collections along the course of the nephrostomy tubes.    Bones: No acute fracture. No suspicious osseous lesions.                                 Medications   sodium chloride 0.9% bolus 1,000 mL (0 mLs Intravenous Stopped 8/24/22 0945)   morphine injection 4 mg (4 mg Intravenous Given 8/24/22 0838)   ondansetron injection 4 mg (4 mg Intravenous Given 8/24/22 0835)   morphine injection 4 mg (4 mg Intravenous Given 8/24/22 1032)     Medical Decision Making:   History:   Old Medical Records: I decided to obtain old medical records.  Old Records Summarized: records from previous admission(s).  Clinical Tests:   Lab Tests: Ordered and Reviewed  Radiological Study: Ordered and Reviewed  Medical Tests: Ordered and Reviewed       APC / Resident Notes:   57 y.o. year old female presenting with left flank and abdominal pain.    DDx includes but is not limited to pyelonephritis, renal colic, obstruction.    Urinalysis is nitrite negative with 2+  leuks but only 2 WBCs and many bacteria. Labs with WBC 13, Cr 1.9, BUN 28, CO2 18.    CT abdomen pelvis shows persistent left hydronephrosis and punctate focus of air, right 4mm stone in proximal ureter.     Plan  Pain improved in the ED and she is tolerating oral intake. Will start cefdninir, urine culture pending. She will f/u with her primary care provider and was counseled on return precautions. She is comfortable with findings and plan.    I discussed the care of this patient with my supervising physician.                   Clinical Impression:   Final diagnoses:  [N12] Pyelonephritis (Primary)  [R10.9] Left flank pain          ED Disposition Condition    Discharge Stable        ED Prescriptions     Medication Sig Dispense Start Date End Date Auth. Provider    cefdinir (OMNICEF) 300 MG capsule Take 1 capsule (300 mg total) by mouth 2 (two) times daily. for 10 days 20 capsule 8/24/2022 9/3/2022 Stephanie PEREZ  SARY Gill        Follow-up Information     Follow up With Specialties Details Why Contact Info    Audrey Mustafa MD Rheumatology Schedule an appointment as soon as possible for a visit   [address] [facility] [address]  [phone]             Stephanie Gill PA-C  08/24/22 5553

## 2022-08-24 NOTE — ED NOTES
LOC: The patient is awake, alert, and oriented to self, place, time, and situation. Pt is calm and cooperative. Affect is appropriate.  Speech is appropriate and clear.      SKIN: The skin is warm and dry; color consistent with ethnicity.  Patient has normal skin turgor and moist mucus membranes.  Skin intact; no breakdown or bruising noted.      MUSCULOSKELETAL: Patient moving upper and lower extremities without difficulty; denies pain in the extremities or back. Reporting leg  weakness.      RESPIRATORY: Airway is open and patent. Respirations spontaneous, even, easy, and non-labored.  Patient has a normal effort and rate.  No accessory muscle use noted. Denies cough.      CARDIAC:   No peripheral edema noted. No complaints of chest pain.       ABDOMEN: Soft and non tender to palpation.  Patient has ileostomy. Pt reports abdominal pain with nausea, vomiting.     NEUROLOGIC: Eyes open spontaneously.  Behavior appropriate to situation.  Follows commands; facial expression symmetrical.  Purposeful motor response noted; normal sensation in all extremities. Pt reporting dizziness.

## 2022-08-26 ENCOUNTER — HOSPITAL ENCOUNTER (INPATIENT)
Facility: HOSPITAL | Age: 57
LOS: 6 days | Discharge: HOME | DRG: 699 | End: 2022-09-01
Attending: EMERGENCY MEDICINE | Admitting: EMERGENCY MEDICINE
Payer: MEDICAID

## 2022-08-26 ENCOUNTER — TELEPHONE (OUTPATIENT)
Dept: WOUND CARE | Facility: CLINIC | Age: 57
End: 2022-08-26
Payer: MEDICAID

## 2022-08-26 DIAGNOSIS — N20.0 NEPHROLITHIASIS: ICD-10-CM

## 2022-08-26 DIAGNOSIS — Z93.6 NEPHROSTOMY STATUS: Chronic | ICD-10-CM

## 2022-08-26 DIAGNOSIS — R07.9 CHEST PAIN: ICD-10-CM

## 2022-08-26 DIAGNOSIS — N39.0 URINARY TRACT INFECTION ASSOCIATED WITH NEPHROSTOMY CATHETER, SEQUELA: ICD-10-CM

## 2022-08-26 DIAGNOSIS — C77.9 METASTATIC SQUAMOUS CELL CARCINOMA TO LYMPH NODE: Chronic | ICD-10-CM

## 2022-08-26 DIAGNOSIS — N39.0 URINARY TRACT INFECTION ASSOCIATED WITH NEPHROSTOMY CATHETER, SUBSEQUENT ENCOUNTER: ICD-10-CM

## 2022-08-26 DIAGNOSIS — E87.20 METABOLIC ACIDOSIS, NORMAL ANION GAP (NAG): ICD-10-CM

## 2022-08-26 DIAGNOSIS — T83.512S URINARY TRACT INFECTION ASSOCIATED WITH NEPHROSTOMY CATHETER, SEQUELA: ICD-10-CM

## 2022-08-26 DIAGNOSIS — N12 PYELONEPHRITIS: Primary | Chronic | ICD-10-CM

## 2022-08-26 DIAGNOSIS — R10.9 ACUTE RIGHT FLANK PAIN: ICD-10-CM

## 2022-08-26 DIAGNOSIS — E87.5 HYPERKALEMIA: ICD-10-CM

## 2022-08-26 DIAGNOSIS — C53.9 MALIGNANT NEOPLASM OF CERVIX, UNSPECIFIED SITE: ICD-10-CM

## 2022-08-26 DIAGNOSIS — T83.512D URINARY TRACT INFECTION ASSOCIATED WITH NEPHROSTOMY CATHETER, SUBSEQUENT ENCOUNTER: ICD-10-CM

## 2022-08-26 DIAGNOSIS — Z93.2 ILEOSTOMY IN PLACE: Chronic | ICD-10-CM

## 2022-08-26 DIAGNOSIS — Z85.41 HISTORY OF CERVICAL CANCER: Chronic | ICD-10-CM

## 2022-08-26 LAB
ALBUMIN SERPL BCP-MCNC: 3.3 G/DL (ref 3.5–5.2)
ALP SERPL-CCNC: 149 U/L (ref 55–135)
ALT SERPL W/O P-5'-P-CCNC: 18 U/L (ref 10–44)
ANION GAP SERPL CALC-SCNC: 11 MMOL/L (ref 8–16)
AST SERPL-CCNC: 27 U/L (ref 10–40)
BACTERIA #/AREA URNS AUTO: ABNORMAL /HPF
BACTERIA UR CULT: ABNORMAL
BASOPHILS # BLD AUTO: 0.02 K/UL (ref 0–0.2)
BASOPHILS NFR BLD: 0.2 % (ref 0–1.9)
BILIRUB SERPL-MCNC: 0.4 MG/DL (ref 0.1–1)
BILIRUB UR QL STRIP: NEGATIVE
BUN SERPL-MCNC: 23 MG/DL (ref 6–20)
BUN SERPL-MCNC: 24 MG/DL (ref 6–30)
CALCIUM SERPL-MCNC: 10 MG/DL (ref 8.7–10.5)
CHLORIDE SERPL-SCNC: 109 MMOL/L (ref 95–110)
CHLORIDE SERPL-SCNC: 110 MMOL/L (ref 95–110)
CLARITY UR REFRACT.AUTO: ABNORMAL
CO2 SERPL-SCNC: 12 MMOL/L (ref 23–29)
COLOR UR AUTO: ABNORMAL
CREAT SERPL-MCNC: 1.8 MG/DL (ref 0.5–1.4)
CREAT SERPL-MCNC: 2.1 MG/DL (ref 0.5–1.4)
DIFFERENTIAL METHOD: ABNORMAL
EOSINOPHIL # BLD AUTO: 0.2 K/UL (ref 0–0.5)
EOSINOPHIL NFR BLD: 1.7 % (ref 0–8)
ERYTHROCYTE [DISTWIDTH] IN BLOOD BY AUTOMATED COUNT: 14.2 % (ref 11.5–14.5)
EST. GFR  (NO RACE VARIABLE): 32.1 ML/MIN/1.73 M^2
GLUCOSE SERPL-MCNC: 112 MG/DL (ref 70–110)
GLUCOSE SERPL-MCNC: 93 MG/DL (ref 70–110)
GLUCOSE UR QL STRIP: NEGATIVE
HCT VFR BLD AUTO: 39.5 % (ref 37–48.5)
HCT VFR BLD CALC: 39 %PCV (ref 36–54)
HGB BLD-MCNC: 12.5 G/DL (ref 12–16)
HGB UR QL STRIP: ABNORMAL
HYALINE CASTS UR QL AUTO: 0 /LPF
IMM GRANULOCYTES # BLD AUTO: 0.02 K/UL (ref 0–0.04)
IMM GRANULOCYTES NFR BLD AUTO: 0.2 % (ref 0–0.5)
KETONES UR QL STRIP: NEGATIVE
LACTATE SERPL-SCNC: 0.9 MMOL/L (ref 0.5–2.2)
LEUKOCYTE ESTERASE UR QL STRIP: ABNORMAL
LYMPHOCYTES # BLD AUTO: 1.7 K/UL (ref 1–4.8)
LYMPHOCYTES NFR BLD: 18.5 % (ref 18–48)
MCH RBC QN AUTO: 28.2 PG (ref 27–31)
MCHC RBC AUTO-ENTMCNC: 31.6 G/DL (ref 32–36)
MCV RBC AUTO: 89 FL (ref 82–98)
MICROSCOPIC COMMENT: ABNORMAL
MONOCYTES # BLD AUTO: 1.4 K/UL (ref 0.3–1)
MONOCYTES NFR BLD: 15.2 % (ref 4–15)
NEUTROPHILS # BLD AUTO: 5.8 K/UL (ref 1.8–7.7)
NEUTROPHILS NFR BLD: 64.2 % (ref 38–73)
NITRITE UR QL STRIP: NEGATIVE
NRBC BLD-RTO: 0 /100 WBC
PH UR STRIP: 7 [PH] (ref 5–8)
PLATELET # BLD AUTO: 203 K/UL (ref 150–450)
PMV BLD AUTO: 11 FL (ref 9.2–12.9)
POC IONIZED CALCIUM: 1.36 MMOL/L (ref 1.06–1.42)
POC TCO2 (MEASURED): 20 MMOL/L (ref 23–29)
POTASSIUM BLD-SCNC: 4 MMOL/L (ref 3.5–5.1)
POTASSIUM SERPL-SCNC: 5.6 MMOL/L (ref 3.5–5.1)
PROT SERPL-MCNC: 9 G/DL (ref 6–8.4)
PROT UR QL STRIP: ABNORMAL
RBC # BLD AUTO: 4.43 M/UL (ref 4–5.4)
RBC #/AREA URNS AUTO: 12 /HPF (ref 0–4)
SAMPLE: ABNORMAL
SARS-COV-2 RDRP RESP QL NAA+PROBE: NEGATIVE
SODIUM BLD-SCNC: 138 MMOL/L (ref 136–145)
SODIUM SERPL-SCNC: 133 MMOL/L (ref 136–145)
SP GR UR STRIP: 1.01 (ref 1–1.03)
SQUAMOUS #/AREA URNS AUTO: 0 /HPF
URN SPEC COLLECT METH UR: ABNORMAL
WBC # BLD AUTO: 9.05 K/UL (ref 3.9–12.7)
WBC #/AREA URNS AUTO: >100 /HPF (ref 0–5)

## 2022-08-26 PROCEDURE — 93010 EKG 12-LEAD: ICD-10-PCS | Mod: ,,, | Performed by: INTERNAL MEDICINE

## 2022-08-26 PROCEDURE — 87086 URINE CULTURE/COLONY COUNT: CPT

## 2022-08-26 PROCEDURE — 81001 URINALYSIS AUTO W/SCOPE: CPT

## 2022-08-26 PROCEDURE — 99285 EMERGENCY DEPT VISIT HI MDM: CPT | Mod: 25

## 2022-08-26 PROCEDURE — 83605 ASSAY OF LACTIC ACID: CPT

## 2022-08-26 PROCEDURE — 11000001 HC ACUTE MED/SURG PRIVATE ROOM

## 2022-08-26 PROCEDURE — G0378 HOSPITAL OBSERVATION PER HR: HCPCS

## 2022-08-26 PROCEDURE — 25000003 PHARM REV CODE 250: Performed by: STUDENT IN AN ORGANIZED HEALTH CARE EDUCATION/TRAINING PROGRAM

## 2022-08-26 PROCEDURE — 80047 BASIC METABLC PNL IONIZED CA: CPT

## 2022-08-26 PROCEDURE — 25000003 PHARM REV CODE 250

## 2022-08-26 PROCEDURE — 63600175 PHARM REV CODE 636 W HCPCS: Performed by: STUDENT IN AN ORGANIZED HEALTH CARE EDUCATION/TRAINING PROGRAM

## 2022-08-26 PROCEDURE — U0002 COVID-19 LAB TEST NON-CDC: HCPCS | Performed by: STUDENT IN AN ORGANIZED HEALTH CARE EDUCATION/TRAINING PROGRAM

## 2022-08-26 PROCEDURE — 93005 ELECTROCARDIOGRAM TRACING: CPT

## 2022-08-26 PROCEDURE — 99223 1ST HOSP IP/OBS HIGH 75: CPT | Mod: ,,, | Performed by: STUDENT IN AN ORGANIZED HEALTH CARE EDUCATION/TRAINING PROGRAM

## 2022-08-26 PROCEDURE — 96372 THER/PROPH/DIAG INJ SC/IM: CPT | Performed by: STUDENT IN AN ORGANIZED HEALTH CARE EDUCATION/TRAINING PROGRAM

## 2022-08-26 PROCEDURE — 80053 COMPREHEN METABOLIC PANEL: CPT | Performed by: EMERGENCY MEDICINE

## 2022-08-26 PROCEDURE — 99285 PR EMERGENCY DEPT VISIT,LEVEL V: ICD-10-PCS | Mod: GC,CS,, | Performed by: EMERGENCY MEDICINE

## 2022-08-26 PROCEDURE — 99285 EMERGENCY DEPT VISIT HI MDM: CPT | Mod: GC,CS,, | Performed by: EMERGENCY MEDICINE

## 2022-08-26 PROCEDURE — 85025 COMPLETE CBC W/AUTO DIFF WBC: CPT | Performed by: EMERGENCY MEDICINE

## 2022-08-26 PROCEDURE — 93010 ELECTROCARDIOGRAM REPORT: CPT | Mod: ,,, | Performed by: INTERNAL MEDICINE

## 2022-08-26 PROCEDURE — 99223 PR INITIAL HOSPITAL CARE,LEVL III: ICD-10-PCS | Mod: ,,, | Performed by: STUDENT IN AN ORGANIZED HEALTH CARE EDUCATION/TRAINING PROGRAM

## 2022-08-26 PROCEDURE — 63600175 PHARM REV CODE 636 W HCPCS

## 2022-08-26 PROCEDURE — 96374 THER/PROPH/DIAG INJ IV PUSH: CPT

## 2022-08-26 RX ORDER — MORPHINE SULFATE 4 MG/ML
4 INJECTION, SOLUTION INTRAMUSCULAR; INTRAVENOUS
Status: COMPLETED | OUTPATIENT
Start: 2022-08-26 | End: 2022-08-26

## 2022-08-26 RX ORDER — GABAPENTIN 100 MG/1
100 CAPSULE ORAL NIGHTLY
Status: DISCONTINUED | OUTPATIENT
Start: 2022-08-26 | End: 2022-08-30

## 2022-08-26 RX ORDER — MORPHINE SULFATE ORAL SOLUTION 10 MG/5ML
4 SOLUTION ORAL EVERY 4 HOURS PRN
Status: DISCONTINUED | OUTPATIENT
Start: 2022-08-26 | End: 2022-08-27

## 2022-08-26 RX ORDER — TALC
6 POWDER (GRAM) TOPICAL NIGHTLY PRN
Status: DISCONTINUED | OUTPATIENT
Start: 2022-08-26 | End: 2022-09-01 | Stop reason: HOSPADM

## 2022-08-26 RX ORDER — IBUPROFEN 200 MG
24 TABLET ORAL
Status: DISCONTINUED | OUTPATIENT
Start: 2022-08-26 | End: 2022-09-01 | Stop reason: HOSPADM

## 2022-08-26 RX ORDER — SODIUM CHLORIDE 0.9 % (FLUSH) 0.9 %
10 SYRINGE (ML) INJECTION EVERY 12 HOURS PRN
Status: DISCONTINUED | OUTPATIENT
Start: 2022-08-26 | End: 2022-09-01 | Stop reason: HOSPADM

## 2022-08-26 RX ORDER — ACETAMINOPHEN 500 MG
1000 TABLET ORAL
Status: COMPLETED | OUTPATIENT
Start: 2022-08-26 | End: 2022-08-26

## 2022-08-26 RX ORDER — SODIUM CHLORIDE 0.9 % (FLUSH) 0.9 %
10 SYRINGE (ML) INJECTION
Status: DISCONTINUED | OUTPATIENT
Start: 2022-08-26 | End: 2022-09-01 | Stop reason: HOSPADM

## 2022-08-26 RX ORDER — ACETAMINOPHEN 325 MG/1
650 TABLET ORAL EVERY 8 HOURS PRN
Status: DISCONTINUED | OUTPATIENT
Start: 2022-08-26 | End: 2022-08-30

## 2022-08-26 RX ORDER — HEPARIN SODIUM 5000 [USP'U]/ML
5000 INJECTION, SOLUTION INTRAVENOUS; SUBCUTANEOUS EVERY 8 HOURS
Status: DISCONTINUED | OUTPATIENT
Start: 2022-08-26 | End: 2022-08-28

## 2022-08-26 RX ORDER — ONDANSETRON 2 MG/ML
4 INJECTION INTRAMUSCULAR; INTRAVENOUS EVERY 8 HOURS PRN
Status: DISCONTINUED | OUTPATIENT
Start: 2022-08-26 | End: 2022-09-01 | Stop reason: HOSPADM

## 2022-08-26 RX ADMIN — SODIUM CHLORIDE, POTASSIUM CHLORIDE, SODIUM LACTATE AND CALCIUM CHLORIDE 1000 ML: 600; 310; 30; 20 INJECTION, SOLUTION INTRAVENOUS at 10:08

## 2022-08-26 RX ADMIN — GABAPENTIN 100 MG: 100 CAPSULE ORAL at 10:08

## 2022-08-26 RX ADMIN — MORPHINE SULFATE 4 MG: 4 INJECTION INTRAVENOUS at 07:08

## 2022-08-26 RX ADMIN — HEPARIN SODIUM 5000 UNITS: 5000 INJECTION INTRAVENOUS; SUBCUTANEOUS at 10:08

## 2022-08-26 RX ADMIN — ACETAMINOPHEN 1000 MG: 500 TABLET ORAL at 07:08

## 2022-08-26 NOTE — FIRST PROVIDER EVALUATION
Medical screening exam completed.  I have conducted a focused provider triage encounter, findings are as follows:    Brief history of present illness:  57-year-old female history of hypertension, cervical cancer s/p surgery and pelvic radiation presenting today for pain around her nephrostomy tubes.  States she was recently diagnosed with a kidney infection, is currently on antibiotics.  Took 1st dose today.  Nephrostomy tubes draining appropriately.    There were no vitals filed for this visit.    Pertinent physical exam:  Sitting in wheelchair, chronically ill-appearing.  Nephrostomy tube with yellow urine      Brief workup plan:  Labs    Preliminary workup initiated; this workup will be continued and followed by the physician or advanced practice provider that is assigned to the patient when roomed.

## 2022-08-26 NOTE — TELEPHONE ENCOUNTER
Called her and reminded her she has to call the DME (notme)  [employer] in her case to get this fixed,  She said ok she forgot.

## 2022-08-26 NOTE — TELEPHONE ENCOUNTER
----- Message from Nicolasa Villarreal MA sent at 8/26/2022 12:32 PM CDT -----  Contact: [phone]  Patient is calling to inform that boxes of urine bags have come in and not the BM bags. Please call and advise.

## 2022-08-26 NOTE — ED NOTES
Bed: [facility]  Expected date: 8/26/22  Expected time:   Means of arrival:   Comments:  Back pain

## 2022-08-27 LAB
ALBUMIN SERPL BCP-MCNC: 3.1 G/DL (ref 3.5–5.2)
ALP SERPL-CCNC: 141 U/L (ref 55–135)
ALT SERPL W/O P-5'-P-CCNC: 16 U/L (ref 10–44)
ANION GAP SERPL CALC-SCNC: 10 MMOL/L (ref 8–16)
AST SERPL-CCNC: 17 U/L (ref 10–40)
BILIRUB SERPL-MCNC: 0.3 MG/DL (ref 0.1–1)
BUN SERPL-MCNC: 21 MG/DL (ref 6–20)
CALCIUM SERPL-MCNC: 9.9 MG/DL (ref 8.7–10.5)
CHLORIDE SERPL-SCNC: 106 MMOL/L (ref 95–110)
CO2 SERPL-SCNC: 17 MMOL/L (ref 23–29)
CREAT SERPL-MCNC: 1.6 MG/DL (ref 0.5–1.4)
ERYTHROCYTE [DISTWIDTH] IN BLOOD BY AUTOMATED COUNT: 14.3 % (ref 11.5–14.5)
EST. GFR  (NO RACE VARIABLE): 36.9 ML/MIN/1.73 M^2
GLUCOSE SERPL-MCNC: 87 MG/DL (ref 70–110)
HCT VFR BLD AUTO: 39.7 % (ref 37–48.5)
HGB BLD-MCNC: 12.5 G/DL (ref 12–16)
MAGNESIUM SERPL-MCNC: 2 MG/DL (ref 1.6–2.6)
MCH RBC QN AUTO: 28.1 PG (ref 27–31)
MCHC RBC AUTO-ENTMCNC: 31.5 G/DL (ref 32–36)
MCV RBC AUTO: 89 FL (ref 82–98)
PLATELET # BLD AUTO: 171 K/UL (ref 150–450)
PMV BLD AUTO: 11.6 FL (ref 9.2–12.9)
POTASSIUM SERPL-SCNC: 3.7 MMOL/L (ref 3.5–5.1)
PROT SERPL-MCNC: 7.9 G/DL (ref 6–8.4)
RBC # BLD AUTO: 4.45 M/UL (ref 4–5.4)
SODIUM SERPL-SCNC: 133 MMOL/L (ref 136–145)
WBC # BLD AUTO: 8.22 K/UL (ref 3.9–12.7)

## 2022-08-27 PROCEDURE — 80053 COMPREHEN METABOLIC PANEL: CPT | Performed by: STUDENT IN AN ORGANIZED HEALTH CARE EDUCATION/TRAINING PROGRAM

## 2022-08-27 PROCEDURE — 36415 COLL VENOUS BLD VENIPUNCTURE: CPT | Performed by: STUDENT IN AN ORGANIZED HEALTH CARE EDUCATION/TRAINING PROGRAM

## 2022-08-27 PROCEDURE — 25000003 PHARM REV CODE 250: Performed by: STUDENT IN AN ORGANIZED HEALTH CARE EDUCATION/TRAINING PROGRAM

## 2022-08-27 PROCEDURE — 63600175 PHARM REV CODE 636 W HCPCS: Performed by: STUDENT IN AN ORGANIZED HEALTH CARE EDUCATION/TRAINING PROGRAM

## 2022-08-27 PROCEDURE — 99226 PR SUBSEQUENT OBSERVATION CARE,LEVEL III: CPT | Mod: ,,, | Performed by: STUDENT IN AN ORGANIZED HEALTH CARE EDUCATION/TRAINING PROGRAM

## 2022-08-27 PROCEDURE — 99226 PR SUBSEQUENT OBSERVATION CARE,LEVEL III: ICD-10-PCS | Mod: ,,, | Performed by: STUDENT IN AN ORGANIZED HEALTH CARE EDUCATION/TRAINING PROGRAM

## 2022-08-27 PROCEDURE — 96372 THER/PROPH/DIAG INJ SC/IM: CPT | Performed by: STUDENT IN AN ORGANIZED HEALTH CARE EDUCATION/TRAINING PROGRAM

## 2022-08-27 PROCEDURE — G0378 HOSPITAL OBSERVATION PER HR: HCPCS

## 2022-08-27 PROCEDURE — 85027 COMPLETE CBC AUTOMATED: CPT | Performed by: STUDENT IN AN ORGANIZED HEALTH CARE EDUCATION/TRAINING PROGRAM

## 2022-08-27 PROCEDURE — 11000001 HC ACUTE MED/SURG PRIVATE ROOM

## 2022-08-27 PROCEDURE — 83735 ASSAY OF MAGNESIUM: CPT | Performed by: STUDENT IN AN ORGANIZED HEALTH CARE EDUCATION/TRAINING PROGRAM

## 2022-08-27 RX ORDER — MORPHINE SULFATE 4 MG/ML
4 INJECTION, SOLUTION INTRAMUSCULAR; INTRAVENOUS EVERY 6 HOURS PRN
Status: DISCONTINUED | OUTPATIENT
Start: 2022-08-27 | End: 2022-08-29

## 2022-08-27 RX ADMIN — CEFTRIAXONE 1 G: 1 INJECTION, SOLUTION INTRAVENOUS at 12:08

## 2022-08-27 RX ADMIN — MORPHINE SULFATE 4 MG: 4 INJECTION INTRAVENOUS at 01:08

## 2022-08-27 RX ADMIN — GABAPENTIN 100 MG: 100 CAPSULE ORAL at 09:08

## 2022-08-27 RX ADMIN — HEPARIN SODIUM 5000 UNITS: 5000 INJECTION INTRAVENOUS; SUBCUTANEOUS at 01:08

## 2022-08-27 RX ADMIN — CEFTRIAXONE 1 G: 1 INJECTION, SOLUTION INTRAVENOUS at 09:08

## 2022-08-27 RX ADMIN — MORPHINE SULFATE 4 MG: 10 SOLUTION ORAL at 05:08

## 2022-08-27 RX ADMIN — HEPARIN SODIUM 5000 UNITS: 5000 INJECTION INTRAVENOUS; SUBCUTANEOUS at 09:08

## 2022-08-27 NOTE — CONSULTS
Radiology Consult    Edita Riley is a 57 y.o. female with  cervical cancer s/p pelvic XRT c/b B ureteral strictures. B PCN last exchanged 5/2/2022.  Past Medical History:   Diagnosis Date    Abnormal mammogram 8/25/2020    Acute deep vein thrombosis (DVT) of lower extremity 12/9/2020    Anxiety     Cervical cancer 2014    Chronic back pain     Depression     Diarrhea due to malabsorption 11/14/2018    DVT of lower extremity, bilateral 11/4/2020    Fibromyalgia     Fungemia 9/27/2020    Generalized abdominal pain 8/25/2020    GERD (gastroesophageal reflux disease)     Hemifacial spasm 9/16/2015    Hiatal hernia 2014    History of cervical cancer 10/11/2018    Hx of psychiatric care     Cymbalta, trazodone    Hypertension     Hypomagnesemia 11/21/2018    Lactose intolerance     Metastatic squamous cell carcinoma to lymph node 10/11/2018    Nephrostomy tube displaced     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 drug 11/14/2018    Osteoarthritis of back     Peritonitis 9/22/2020    Pseudomonas urinary tract infection 4/21/2021    Psychiatric problem     Schatzki's ring 9/14/2015    Seen on outside EGD 05/2014, underwent esophageal dilatation. Bx were negative.     Stroke 1972    Urinary tract infection associated with nephrostomy catheter 6/11/2020    Wound infection after surgery 9/24/2020     Past Surgical History:   Procedure Laterality Date    ANTEGRADE NEPHROSTOGRAPHY Bilateral 9/28/2020    Procedure: Nephrostogram - antegrade;  Surgeon: Leslie Balbuena MD;  Location: [address];  Service: Urology;  Laterality: Bilateral;    ANTEGRADE NEPHROSTOGRAPHY Left 4/20/2021    Procedure: NEPHROSTOGRAM, ANTEGRADE;  Surgeon: Leslie Balbuena MD;  Location: [facility] OR [address] [place] [address];  Service: Urology;  Laterality: Left;    BILATERAL OOPHORECTOMY  2015    CHOLECYSTECTOMY  11/09/2016    Done at [facility], path showed chronic cholecystitis and gallstones    cold knife conization  2014    COLECTOMY, RIGHT  9/17/2020     Procedure: COLECTOMY, RIGHT Extended;  Surgeon: Hammad Reynolds MD;  [facility] 2ND FLR;  Service: Colon and Rectal;;    COLONOSCOPY  2014    [facility]    COLONOSCOPY N/A 5/18/2018    Procedure: COLONOSCOPY;  Surgeon: Arden Gutiérrez MD;  Location: [facility] (4TH FLR);  Service: Endoscopy;  Laterality: N/A;    COLONOSCOPY N/A 7/28/2020    Procedure: COLONOSCOPY;  Surgeon: Hammad Reynolds MD;  Location: [facility] ENDO (4TH FLR);  Service: Colon and Rectal;  Laterality: N/A;  covid test [place] 7/25    CYSTOSCOPY WITH URETEROSCOPY, RETROGRADE PYELOGRAPHY, AND INSERTION OF STENT Bilateral 3/21/2020    Procedure: CYSTOSCOPY, WITH RETROGRADE PYELOGRAM,;  Surgeon: Leslie Balbuena MD;  Location: [facility] OR 1ST FLR;  Service: Urology;  Laterality: Bilateral;    ESOPHAGOGASTRODUODENOSCOPY  2014    ESOPHAGOGASTRODUODENOSCOPY  11/18/2020    ESOPHAGOGASTRODUODENOSCOPY N/A 11/18/2020    Procedure: ESOPHAGOGASTRODUODENOSCOPY (EGD);  Surgeon: Zenon Spencer MD;  Location: [facility];  Service: Endoscopy;  Laterality: N/A;    ESOPHAGOGASTRODUODENOSCOPY N/A 12/11/2020    Procedure: EGD (ESOPHAGOGASTRODUODENOSCOPY);  Surgeon: Juancho Muse MD;  Location: [facility] (2ND FLR);  Service: Endoscopy;  Laterality: N/A;    HYSTERECTOMY  2014    [facility] for cervical cancer    ILEOSTOMY  9/17/2020    Procedure: CREATION, ILEOSTOMY;  Surgeon: Hammad Reynolds MD;  Location: [facility] OR 2ND FLR;  Service: Colon and Rectal;;    LOOPOGRAM N/A 4/20/2021    Procedure: LOOPOGRAM;  Surgeon: Leslie Balbuena MD;  Location: [facility] OR 1ST FLR;  Service: Urology;  Laterality: N/A;    MOBILIZATION OF SPLENIC FLEXURE N/A 9/11/2020    Procedure: MOBILIZATION, COLONIC;  Surgeon: Hammad Reynolds MD;  [facility] 2[facility] FLR;  Service: Colon and Rectal;  Laterality: N/A;    NEPHROSTOGRAPHY Bilateral 4/17/2021    Procedure: Nephrostogram;  Surgeon: Celeste Surgeon;  Location: [facility];  Service: Anesthesiology;   Laterality: Bilateral;    OOPHORECTOMY      RETROPERITONEAL LYMPHADENECTOMY Right 9/17/2018    Procedure: LYMPHADENECTOMY, RETROPERITONEUM;  Surgeon: Sebas Reed MD;  Location: [facility] OR [address] [place] [address];  Service: General;  Laterality: Right;  ILIAC       Discussed with primary team, Dr. Gray .    Imaging reviewed with Radiology staff, Dr. Ascencio.     Procedure: Bilateral nephrostomy tube exchange     Scheduled Meds:    cefTRIAXone (ROCEPHIN) IVPB  1 g Intravenous Q24H    gabapentin  100 mg Oral QHS    heparin (porcine)  5,000 Units Subcutaneous Q8H     Continuous Infusions:   PRN Meds:acetaminophen, glucose, melatonin, morphine, ondansetron, sodium chloride 0.9%, sodium chloride 0.9%    Allergies:   Review of patient's allergies indicates:   Allergen Reactions    Bee sting [allergen ext-venom-honey bee]      Rash      Grass pollen-bermuda, standard      rash       Labs:  No results for input(s): INR in the last 168 hours.    Invalid input(s):  PT,  PTT    Recent Labs   Lab 08/27/22  0254   WBC 8.22   HGB 12.5   HCT 39.7   MCV 89         Recent Labs   Lab 08/27/22  0254   GLU 87   *   K 3.7      CO2 17*   BUN 21*   CREATININE 1.6*   CALCIUM 9.9   MG 2.0   ALT 16   AST 17   ALBUMIN 3.1*   BILITOT 0.3         Vitals (Most Recent):  Temp: 97 °F (36.1 °C) (08/27/22 1225)  Pulse: 72 (08/27/22 1225)  Resp: 18 (08/27/22 1225)  BP: (!) 107/57 (08/27/22 1225)  SpO2: 100 % (08/27/22 1225)    Plan:   1. NPO after midnight 8/29.  2. Hold anticoagulant 1 dose before procedure.   3. Will scheduled for 8/29.    Yong Rick MD  Radiology Resident PGY- 2  [facility]

## 2022-08-27 NOTE — SUBJECTIVE & OBJECTIVE
Interval History: No acute events overnight. C/o flank pain which is relieved with pain medication.     Review of Systems  Objective:     Vital Signs (Most Recent):  Temp: 97 °F (36.1 °C) (08/27/22 1225)  Pulse: 72 (08/27/22 1225)  Resp: 18 (08/27/22 1225)  BP: (!) 107/57 (08/27/22 1225)  SpO2: 100 % (08/27/22 1225)   Vital Signs (24h Range):  Temp:  [97 °F (36.1 °C)-98.6 °F (37 °C)] 97 °F (36.1 °C)  Pulse:  [69-95] 72  Resp:  [16-20] 18  SpO2:  [97 %-100 %] 100 %  BP: ()/(57-74) 107/57     Weight: 88.5 kg (195 lb)  Body mass index is 28.8 kg/m².    Intake/Output Summary (Last 24 hours) at 8/27/2022 1313  Last data filed at 8/27/2022 1019  Gross per 24 hour   Intake 0 ml   Output 775 ml   Net -775 ml      Physical Exam  Constitutional:       Appearance: She is not toxic-appearing or diaphoretic.   Cardiovascular:      Rate and Rhythm: Normal rate and regular rhythm.      Heart sounds: No murmur heard.    No friction rub. No gallop.   Pulmonary:      Effort: Pulmonary effort is normal. No respiratory distress.   Abdominal:      General: Abdomen is flat. There is no distension.      Palpations: Abdomen is soft.      Tenderness: There is no abdominal tenderness. There is no guarding or rebound.      Comments: Bl Nephrostomy   Musculoskeletal:      Right lower leg: No edema.      Left lower leg: No edema.   Neurological:      Mental Status: She is alert.       MELD-Na score: 14 at 5/2/2022  9:24 AM  MELD score: 14 at 5/2/2022  9:24 AM  Calculated from:  Serum Creatinine: 2.2 mg/dL at 5/2/2022  9:24 AM  Serum Sodium: 141 mmol/L (Using max of 137 mmol/L) at 5/2/2022  9:24 AM  Total Bilirubin: 0.2 mg/dL (Using min of 1 mg/dL) at 5/2/2022  9:24 AM  INR(ratio): 0.9 (Using min of 1) at 5/2/2022  9:24 AM  Age: 57 years    Significant Labs:  CBC:  Recent Labs   Lab 08/26/22  1810 08/26/22 2019 08/27/22  0254   WBC 9.05  --  8.22   HGB 12.5  --  12.5   HCT 39.5 39 39.7     --  171     CMP:  Recent Labs   Lab  08/26/22  1810 08/27/22  0254   * 133*   K 5.6* 3.7    106   CO2 12* 17*   GLU 93 87   BUN 23* 21*   CREATININE 1.8* 1.6*   CALCIUM 10.0 9.9   PROT 9.0* 7.9   ALBUMIN 3.3* 3.1*   BILITOT 0.4 0.3   ALKPHOS 149* 141*   AST 27 17   ALT 18 16   ANIONGAP 11 10     PTINR:  No results for input(s): INR in the last 48 hours.    Significant Procedures:   Dobutamine Stress Test with Color Flow: No results found for this or any previous visit.

## 2022-08-27 NOTE — HPI
[facility]57 [facility] with pmhx of HTN, cervical cancer s/p surgery and pelvic radiation, open transverse colon conduit urinary diversion on 9/11/2020 complicated by bowel perforation s/p take back for hemicolectomy and ileostomy creation (9/17/20). .She was seen 8/24 for left-sided flank pain and was diagnosed with left-sided pyelonephritis and discharged on Cefdinir, incidental right-sided 4mm and 7mm uretal stones noted at that time. Interim urine cultures grew pan-sensitive Klebsiella pneumoniae. She is here today with right-sided flank pain for two days likey from the stones. The pain is a constant stabbing pain that is worse with movement. She has tried tylenol for the pain without relief. She reports her left-sided pain has improved. She denies fever and chills. Both nephrostomy tubes are draining appropriately, urine is non-bloody and green, stoma looks healthy with green liquid bowel contents. She denies f/v, n/v. Nephrostomy tubes last exchanged May 2022, previously exchanged every 3 months.    On assessment, pt afebrile and HDS. Cr 1.8 (baseline 1.3 6 months ago), wbc 9, hgb 12.5. Ucx pending, Ucx from 8/24 growing klebsiella given cefdinir. Blood cx no growth. Neph tubes both flushing appropriately. CT from 8/24 right renal stones 4 and 7 mm, right ureteral stone 4 mm, no right hydro, persistent left hydro from prior exams. Loopogram from 4/19/21 showed bilateral reflux, more sluggish on the left compared to the right side.

## 2022-08-27 NOTE — ASSESSMENT & PLAN NOTE
- patient has a history of cervical cancer and is s/p surgical resection with pelvic radiation with a history of an open transverse colon conduit and urinary diversion complicated by bowel perforation and subsequently received hemicolectomy and ileostomy creation  - Wound Care consult placed for ostomy care

## 2022-08-27 NOTE — ASSESSMENT & PLAN NOTE
Patient with acute kidney injury likely due to post-obstructive d/t obstruction secondary to cervical cancer and fibrosis ODESSA is currently worsening. Labs reviewed- Renal function/electrolytes with Estimated Creatinine Clearance: 44.9 mL/min (A) (based on SCr of 1.6 mg/dL (H)).   - gave 1 liter of lactated ringer's solution  - Monitor urine output and serial BMP and adjust therapy as needed  - Avoid nephrotoxins and renally dose meds for GFR listed above

## 2022-08-27 NOTE — CONSULTS
[facility]  Urology  Consult Note    Patient Name: Edita Riley  MRN: [id number]  Admission Date: 8/26/2022  Hospital Length of Stay: 0   Code Status: Prior   Attending Provider: Gissel Bryson MD   Consulting Provider: Tung Middleton MD  Primary Care Physician: No primary care provider on file.  Principal Problem:<principal problem not specified>    Inpatient consult to Urology  Consult performed by: Tung Middleton MD  Consult ordered by: Andriy Coley MD          Subjective:     HPI:  Edita Riley is a57 year old female with pmhx of HTN, cervical cancer s/p surgery and pelvic radiation, open transverse colon conduit urinary diversion on 9/11/2020 complicated by bowel perforation s/p take back for hemicolectomy and ileostomy creation (9/17/20). .She was seen 8/24 for left-sided flank pain and was diagnosed with left-sided pyelonephritis and discharged on Cefdinir, incidental right-sided 4mm and 7mm uretal stones noted at that time. Interim urine cultures grew pan-sensitive Klebsiella pneumoniae. She is here today with right-sided flank pain for two days likey from the stones. The pain is a constant stabbing pain that is worse with movement. She has tried tylenol for the pain without relief. She reports her left-sided pain has improved. She denies fever and chills. Both nephrostomy tubes are draining appropriately, urine is non-bloody and green, stoma looks healthy with green liquid bowel contents. She denies f/v, n/v. Nephrostomy tubes last exchanged May 2022, previously exchanged every 3 months.    On assessment, pt afebrile and HDS. Cr 1.8 (baseline 1.3 6 months ago), wbc 9, hgb 12.5. Ucx pending, Ucx from 8/24 growing klebsiella given cefdinir. Blood cx no growth. Neph tubes both flushing appropriately. CT from 8/24 right renal stones 4 and 7 mm, right ureteral stone 4 mm, no right hydro, persistent left hydro from prior exams. Loopogram from 4/19/21 showed bilateral  reflux, more sluggish on the left compared to the right side.      Past Medical History:   Diagnosis Date    Abnormal mammogram 8/25/2020    Acute deep vein thrombosis (DVT) of lower extremity 12/9/2020    Anxiety     Cervical cancer 2014    Chronic back pain     Depression     Diarrhea due to malabsorption 11/14/2018    DVT of lower extremity, bilateral 11/4/2020    Fibromyalgia     Fungemia 9/27/2020    Generalized abdominal pain 8/25/2020    GERD (gastroesophageal reflux disease)     Hemifacial spasm 9/16/2015    Hiatal hernia 2014    History of cervical cancer 10/11/2018    Hx of psychiatric care     Cymbalta, trazodone    Hypertension     Hypomagnesemia 11/21/2018    Lactose intolerance     Metastatic squamous cell carcinoma to lymph node 10/11/2018    Nephrostomy tube displaced     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 drug 11/14/2018    Osteoarthritis of back     Peritonitis 9/22/2020    Pseudomonas urinary tract infection 4/21/2021    Psychiatric problem     Schatzki's ring 9/14/2015    Seen on outside EGD 05/2014, underwent esophageal dilatation. Bx were negative.     Stroke 1972    Urinary tract infection associated with nephrostomy catheter 6/11/2020    Wound infection after surgery 9/24/2020       Past Surgical History:   Procedure Laterality Date    ANTEGRADE NEPHROSTOGRAPHY Bilateral 9/28/2020    Procedure: Nephrostogram - antegrade;  Surgeon: Leslie Balbuena MD;  Location: [facility] OR [address] [place] [address];  Service: Urology;  Laterality: Bilateral;    ANTEGRADE NEPHROSTOGRAPHY Left 4/20/2021    Procedure: NEPHROSTOGRAM, ANTEGRADE;  Surgeon: Leslie Balbuena MD;  Location: [facility] OR [address] [place] [address];  Service: Urology;  Laterality: Left;    BILATERAL OOPHORECTOMY  2015    CHOLECYSTECTOMY  11/09/2016    Done at [facility], path showed chronic cholecystitis and gallstones    cold knife conization  2014    COLECTOMY, RIGHT  9/17/2020    Procedure: COLECTOMY, RIGHT Extended;  Surgeon: Hammad Reynolds,  MD;  Location: [facility] OR 2ND FLR;  Service: Colon and Rectal;;    COLONOSCOPY  2014    [facility]    COLONOSCOPY N/A 5/18/2018    Procedure: COLONOSCOPY;  Surgeon: Arden Gutiérrez MD;  Location: [facility] (4TH FLR);  Service: Endoscopy;  Laterality: N/A;    COLONOSCOPY N/A 7/28/2020    Procedure: COLONOSCOPY;  Surgeon: Hammad Reynolds MD;  Location: [facility] (4TH FLR);  Service: Colon and Rectal;  Laterality: N/A;  [place]id test [place] 7/25    CYSTOSCOPY WITH URETEROSCOPY, RETROGRADE PYELOGRAPHY, AND INSERTION OF STENT Bilateral 3/21/2020    Procedure: CYSTOSCOPY, WITH RETROGRADE PYELOGRAM,;  Surgeon: Leslie Balbuena MD;  Location: [facility] OR 1ST FLR;  Service: Urology;  Laterality: Bilateral;    ESOPHAGOGASTRODUODENOSCOPY  2014    ESOPHAGOGASTRODUODENOSCOPY  11/18/2020    ESOPHAGOGASTRODUODENOSCOPY N/A 11/18/2020    Procedure: ESOPHAGOGASTRODUODENOSCOPY (EGD);  Surgeon: Zenon Spencer MD;  Location: [facility];  Service: Endoscopy;  Laterality: N/A;    ESOPHAGOGASTRODUODENOSCOPY N/A 12/11/2020    Procedure: EGD (ESOPHAGOGASTRODUODENOSCOPY);  Surgeon: Juancho Muse MD;  Location: [facility] (2ND FLR);  Service: Endoscopy;  Laterality: N/A;    HYSTERECTOMY  2014    [facility] for cervical [facility]    ILEOSTOMY  9/17/2020    Procedure: CREATION, ILEOSTOMY;  Surgeon: Hammad Reynolds MD;  Location: [facility] OR 2ND FLR;  Service: Colon and Rectal;;    LOOPOGRAM N/A 4/20/2021    Procedure: LOOPOGRAM;  Surgeon: Leslie Balbuena MD;  Location: [facility] OR 1ST FLR;  Service: Urology;  Laterality: N/A;    MOBILIZATION OF SPLENIC FLEXURE N/A 9/11/2020    Procedure: MOBILIZATION, COLONIC;  Surgeon: Hammad Reynolds MD;  Location: [facility] OR 2ND FLR;  Service: Colon and Rectal;  Laterality: N/A;    NEPHROSTOGRAPHY Bilateral 4/17/2021    Procedure: Nephrostogram;  Surgeon: Celeste Surgeon;  Location: [facility];  Service: Anesthesiology;  Laterality: Bilateral;    OOPHORECTOMY       RETROPERITONEAL LYMPHADENECTOMY Right 9/17/2018    Procedure: LYMPHADENECTOMY, RETROPERITONEUM;  Surgeon: Sebas Reed MD;  Location: [facility] OR [address] [place] [address];  Service: General;  Laterality: Right;  ILIAC       Review of patient's allergies indicates:   Allergen Reactions    Bee sting [allergen ext-venom-honey bee]      Rash      Grass pollen-bermuda, standard      rash       Family History       Problem Relation (Age of Onset)    Breast cancer Maternal Aunt    Cancer Father, Mother    Cervical cancer Mother    Colon cancer Father    Drug abuse Daughter, Daughter    Esophageal cancer Father    Heart disease Sister, Maternal Grandmother    Suicide Daughter            Tobacco Use    Smoking status: Former Smoker    Smokeless tobacco: Never Used   Substance and Sexual Activity    Alcohol use: No     Alcohol/week: 0.0 standard drinks    Drug use: No    Sexual activity: Yes     Partners: Male     Birth control/protection: None     Comment:  19 years since 1999       Review of Systems    Objective:     Temp:  [98.6 °F (37 °C)] 98.6 °F (37 °C)  Pulse:  [95] 95  Resp:  [18-20] 20  SpO2:  [99 %] 99 %  BP: (119)/(74) 119/74     Body mass index is 28.8 kg/m².           Drains       Drain  Duration                  Urostomy 09/11/20 0000 ileal conduit  days         Ileostomy 09/18/20  days         Nephrostomy 05/02/22 1423 Left 12 Fr. 116 days         Nephrostomy 05/02/22 1423 Right 12 Fr. 116 days                    Physical Exam  Constitutional:       Appearance: Normal appearance.   HENT:      Head: Normocephalic.   Eyes:      Pupils: Pupils are equal, round, and reactive to light.   Cardiovascular:      Rate and Rhythm: Normal rate.   Pulmonary:      Effort: Pulmonary effort is normal. No respiratory distress.   Chest:      Chest wall: No tenderness.   Abdominal:      General: There is no distension.      Tenderness: There is abdominal tenderness (RUQ). There is right CVA tenderness. There is no  left CVA tenderness.      Comments: Ileostomy and urostomy draining green urine   Musculoskeletal:         General: Normal range of motion.      Cervical back: Normal range of motion.   Neurological:      General: No focal deficit present.      Mental Status: She is alert and oriented to person, place, and time.   Psychiatric:         Mood and Affect: Mood normal.         Behavior: Behavior normal.       Significant Labs:    BMP:  Recent Labs   Lab 08/24/22  0745 08/26/22  1810    133*   K 4.5 5.6*   * 110   CO2 18* 12*   BUN 28* 23*   CREATININE 1.9* 1.8*   CALCIUM 9.7 10.0       CBC:  Recent Labs   Lab 08/24/22  0745 08/26/22  1810   WBC 13.33* 9.05   HGB 12.7 12.5   HCT 42.0 39.5    203       All pertinent labs results from the past 24 hours have been reviewed.    Significant Imaging:  CT: I have reviewed all results within the past 24 hours and my personal findings are:  pertinent findings in HPI                      Assessment and Plan:     Hydronephrosis s/p bilateral nephrostomy tube  - No acute urologic intervention for right kidney stone and urteral stone. She is infected and has adequate draining and flushing from the nephrostomy tubes, so no need for ureteral stents  - Nephrostomy tubes last exchanged May 2022. Recommend bilateral exchange with IR  - patient is likely being admitted to hospital medicine for acidosis  - Patient needs outpatient urology f/u to discuss stone management  - Continue abx, fu cultures and treat per sensitivities        VTE Risk Mitigation (From admission, onward)    None          Thank you for your consult.     Tung Middleton MD  Urology  [facility] Dept

## 2022-08-27 NOTE — H&P
[facility] - Emergency Dept  [facility] Medicine  History & Physical    Patient Name: Edita Riley  MRN: [id number]  Patient Class: OP- Observation  Admission Date: 8/26/2022  Attending Physician: Jonathan Jalloh*   Primary Care Provider: No primary care provider on file.         Patient information was obtained from patient and ER records.     Subjective:     Principal Problem:<principal problem not specified>    Chief Complaint:   Chief Complaint   Patient presents with    Back Pain     R sided back pain, recently diagnosed with kidney stone on the R side and also being treated for poly nephritis.         HPI: Edita Riley is a 57-year-old woman with a past medical history of HTN, cervical cancer s/p surgery and pelvic radiation, open transverse colon conduit urinary diversion on 9/11/2020 complicated by bowel perforation s/p take back for hemicolectomy and ileostomy creation (9/17/20). She was seen 8/24 for left-sided flank pain and was diagnosed with left-sided pyelonephritis and discharged on Cefdinir, incidental right-sided 4mm and 7mm uretal stones noted at that time. Interim urine cultures grew pan-sensitive Klebsiella pneumoniae. She is here today with right-sided flank pain for two days likey from the stones. The pain is a constant stabbing pain that is worse with movement. She has tried tylenol for the pain without relief. She reports her left-sided pain has improved. She denies fever and chills. Both nephrostomy tubes are draining appropriately, urine is non-bloody and green, stoma looks healthy with green liquid bowel contents. She denies f/v, n/v. Nephrostomy tubes last exchanged May 2022, previously exchanged every 3 months.     Per further discussion on interview with the patient, she reports compliance with her cefdinir. She noted that the right flank pain had eased since the administration of IV narcotic pain medication. She denies any discharge or erythema surrounding  the site of her nephrostomy tube. She reports low-grade temperatures and chills at home. She denies any abdominal pain, nausea or vomiting. She denies any hematuria in her nephrostomy bags. She is amenable to nephrostomy tube exchange in the morning with Interventional Radiology.     In the emergency department, the patient was given acetaminophen and a dose of IV morphine.      Past Medical History:   Diagnosis Date    Abnormal mammogram 8/25/2020    Acute deep vein thrombosis (DVT) of lower extremity 12/9/2020    Anxiety     Cervical cancer 2014    Chronic back pain     Depression     Diarrhea due to malabsorption 11/14/2018    DVT of lower extremity, bilateral 11/4/2020    Fibromyalgia     Fungemia 9/27/2020    Generalized abdominal pain 8/25/2020    GERD (gastroesophageal reflux disease)     Hemifacial spasm 9/16/2015    Hiatal hernia 2014    History of cervical cancer 10/11/2018    Hx of psychiatric care     Cymbalta, trazodone    Hypertension     Hypomagnesemia 11/21/2018    Lactose intolerance     Metastatic squamous cell carcinoma to lymph node 10/11/2018    Nephrostomy tube displaced     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 drug 11/14/2018    Osteoarthritis of back     Peritonitis 9/22/2020    Pseudomonas urinary tract infection 4/21/2021    Psychiatric problem     Schatzki's ring 9/14/2015    Seen on outside EGD 05/2014, underwent esophageal dilatation. Bx were negative.     Stroke 1972    Urinary tract infection associated with nephrostomy catheter 6/11/2020    Wound infection after surgery 9/24/2020       Past Surgical History:   Procedure Laterality Date    ANTEGRADE NEPHROSTOGRAPHY Bilateral 9/28/2020    Procedure: Nephrostogram - antegrade;  Surgeon: Leslie Balbuena MD;  Location: [facility] OR [address] [place] [address];  Service: Urology;  Laterality: Bilateral;    ANTEGRADE NEPHROSTOGRAPHY Left 4/20/2021    Procedure: NEPHROSTOGRAM, ANTEGRADE;  Surgeon: Leslie Balbuena MD;  Location: [facility]  OR 1ST FLR;  Service: Urology;  Laterality: Left;    BILATERAL OOPHORECTOMY  2015    CHOLECYSTECTOMY  11/09/2016    Done at [facility], path showed chronic cholecystitis and gallstones    cold knife conization  2014    COLECTOMY, RIGHT  9/17/2020    Procedure: COLECTOMY, RIGHT Extended;  Surgeon: Hammad Reynolds MD;  Location: [facility] OR 2ND FLR;  Service: Colon and Rectal;;    COLONOSCOPY  2014    [facility]    COLONOSCOPY N/A 5/18/2018    Procedure: COLONOSCOPY;  Surgeon: Arden Gutiérrez MD;  Location: [facility] (4TH FLR);  Service: Endoscopy;  Laterality: N/A;    COLONOSCOPY N/A 7/28/2020    Procedure: COLONOSCOPY;  Surgeon: Hammad Reynolds MD;  Location: [facility] (4TH FLR);  Service: Colon and Rectal;  Laterality: N/A;  covid test [place] 7/25    CYSTOSCOPY WITH URETEROSCOPY, RETROGRADE PYELOGRAPHY, AND INSERTION OF STENT Bilateral 3/21/2020    Procedure: CYSTOSCOPY, WITH RETROGRADE PYELOGRAM,;  Surgeon: Leslie Balbuena MD;  Location: [facility] OR 1ST FLR;  Service: Urology;  Laterality: Bilateral;    ESOPHAGOGASTRODUODENOSCOPY  2014    ESOPHAGOGASTRODUODENOSCOPY  11/18/2020    ESOPHAGOGASTRODUODENOSCOPY N/A 11/18/2020    Procedure: ESOPHAGOGASTRODUODENOSCOPY (EGD);  Surgeon: Zenon Spencer MD;  Location: [facility];  Service: Endoscopy;  Laterality: N/A;    ESOPHAGOGASTRODUODENOSCOPY N/A 12/11/2020    Procedure: EGD (ESOPHAGOGASTRODUODENOSCOPY);  Surgeon: Juancho Muse MD;  Location: [facility] (2ND FLR);  Service: Endoscopy;  Laterality: N/A;    HYSTERECTOMY  2014    [facility] for cervical cancer    ILEOSTOMY  9/17/2020    Procedure: CREATION, ILEOSTOMY;  Surgeon: Hammad Reynolds MD;  Location: [facility] OR 2ND FLR;  Service: Colon and Rectal;;    LOOPOGRAM N/A 4/20/2021    Procedure: LOOPOGRAM;  Surgeon: Leslie Balbuena MD;  Location: [facility] OR 1ST FLR;  Service: Urology;  Laterality: N/A;    MOBILIZATION OF SPLENIC FLEXURE N/A 9/11/2020    Procedure:  MOBILIZATION, COLONIC;  Surgeon: Hammad Reynolds MD;  [facility];  Service: Colon and Rectal;  Laterality: N/A;    NEPHROSTOGRAPHY Bilateral 4/17/2021    Procedure: Nephrostogram;  Surgeon: Celeste Surgeon;  Location: [facility];  Service: Anesthesiology;  Laterality: Bilateral;    OOPHORECTOMY      RETROPERITONEAL LYMPHADENECTOMY Right 9/17/2018    Procedure: LYMPHADENECTOMY, RETROPERITONEUM;  Surgeon: Sebas Reed MD;  [facility];  Service: General;  Laterality: Right;  ILIAC       Review of patient's allergies indicates:   Allergen Reactions    Bee sting [allergen ext-venom-honey bee]      Rash      Grass pollen-bermuda, standard      rash       No current facility-administered medications on file prior to encounter.     Current Outpatient Medications on File Prior to Encounter   Medication Sig    cefdinir (OMNICEF) 300 MG capsule Take 1 capsule (300 mg total) by mouth 2 (two) times daily. for 10 days    ergocalciferol (ERGOCALCIFEROL) 50,000 unit Cap Take 1 capsule (50,000 Units total) by mouth every 7 days.    gabapentin (NEURONTIN) 100 MG capsule Take 1 capsule (100 mg total) by mouth every evening.    melatonin (MELATIN) 3 mg tablet Take 2 tablets (6 mg total) by mouth nightly as needed for Insomnia.    mirtazapine (REMERON SOL-TAB) 15 MG disintegrating tablet Dissolve 1 tablet (15 mg total) by mouth nightly.    pantoprazole (PROTONIX) 40 MG tablet Take 1 tablet (40 mg total) by mouth once daily.    sodium bicarbonate 650 MG tablet Take 2 tablets (1,300 mg total) by mouth 2 (two) times daily.     Family History       Problem Relation (Age of Onset)    Breast cancer Maternal Aunt    Cancer Father, Mother    Cervical cancer Mother    Colon cancer Father    Drug abuse Daughter, Daughter    Esophageal cancer Father    Heart disease Sister, Maternal Grandmother    Suicide Daughter          Tobacco Use    Smoking status: Former Smoker    Smokeless tobacco: Never Used    Substance and Sexual Activity    Alcohol use: No     Alcohol/week: 0.0 standard drinks    Drug use: No    Sexual activity: Yes     Partners: Male     Birth control/protection: None     Comment:  19 years since 1999     Review of Systems   Constitutional:  Positive for chills, fatigue and fever.   HENT:  Negative for congestion, nosebleeds and sore throat.    Eyes:  Negative for pain and discharge.   Respiratory:  Negative for cough, chest tightness and shortness of breath.    Cardiovascular:  Negative for chest pain and leg swelling.   Gastrointestinal:  Negative for abdominal distention, abdominal pain and constipation.   Endocrine: Negative for polyuria.   Genitourinary:  Positive for flank pain. Negative for dysuria, frequency, hematuria, pelvic pain and urgency.   Musculoskeletal:  Positive for back pain. Negative for arthralgias and neck pain.   Skin:  Negative for color change and rash.   Hematological:  Negative for adenopathy.   Psychiatric/Behavioral:  Negative for confusion.    Objective:     Vital Signs (Most Recent):  Temp: 98.6 °F (37 °C) (08/26/22 1742)  Pulse: 95 (08/26/22 1742)  Resp: 20 (08/26/22 1909)  BP: 119/74 (08/26/22 1742)  SpO2: 99 % (08/26/22 1742) Vital Signs (24h Range):  Temp:  [98.6 °F (37 °C)] 98.6 °F (37 °C)  Pulse:  [95] 95  Resp:  [18-20] 20  SpO2:  [99 %] 99 %  BP: (119)/(74) 119/74     Weight: 88.5 kg (195 lb)  Body mass index is 28.8 kg/m².    Physical Exam  Constitutional:       General: She is not in acute distress.     Appearance: She is not toxic-appearing.      Comments: Pleasant woman in no acute distress. She is cooperative with physical examination.   HENT:      Head: Normocephalic and atraumatic.   Eyes:      General: No scleral icterus.     Extraocular Movements: Extraocular movements intact.      Conjunctiva/sclera: Conjunctivae normal.      Right eye: Right conjunctiva is not injected.      Left eye: Left conjunctiva is not injected.   Cardiovascular:       Rate and Rhythm: Normal rate.      Heart sounds: Heart sounds not distant. No murmur heard.    No friction rub.   Pulmonary:      Effort: Pulmonary effort is normal.      Breath sounds: Normal breath sounds.   Chest:      Chest wall: No tenderness.   Abdominal:      General: Abdomen is flat. The ostomy site is clean. Bowel sounds are normal.      Palpations: Abdomen is soft. There is no shifting dullness.      Tenderness: There is no abdominal tenderness. There is no guarding or rebound.      Hernia: No hernia is present.      Comments: Colostomy sites noted in the left lower and right lower quadrants. Nephrostomy tubes noted draining little urine. All sites appear clean, dry and intact.   Musculoskeletal:      Cervical back: Normal range of motion and neck supple.      Right lower leg: No edema.      Left lower leg: No edema.   Skin:     General: Skin is warm and dry.      Comments: Mild xerosis of skin of the lower extremities bilaterally   Neurological:      General: No focal deficit present.      Mental Status: She is oriented to person, place, and time. Mental status is at baseline.      Motor: No weakness.   Psychiatric:         Mood and Affect: Mood normal.         Behavior: Behavior normal.           Significant Labs: All pertinent labs within the past 24 hours have been reviewed.  CBC:   Recent Labs   Lab 08/26/22 1810 08/26/22 2019   WBC 9.05  --    HGB 12.5  --    HCT 39.5 39     --      CMP:   Recent Labs   Lab 08/26/22 1810   *   K 5.6*      CO2 12*   GLU 93   BUN 23*   CREATININE 1.8*   CALCIUM 10.0   PROT 9.0*   ALBUMIN 3.3*   BILITOT 0.4   ALKPHOS 149*   AST 27   ALT 18   ANIONGAP 11     Urine Studies:   Recent Labs   Lab 08/26/22 1943   COLORU Orange*   APPEARANCEUA Ex.Turbid   PHUR 7.0   SPECGRAV 1.010   PROTEINUA 2+*   GLUCUA Negative   KETONESU Negative   BILIRUBINUA Negative   OCCULTUA 2+*   NITRITE Negative   LEUKOCYTESUR 3+*   RBCUA 12*   WBCUA >100*   BACTERIA Many*    SQUAMEPITHEL 0   HYALINECASTS 0       Significant Imaging: I have reviewed all pertinent imaging results/findings within the past 24 hours.    Assessment/Plan:     Hydronephrosis        Pyelonephritis  - recently presented with pyelonephritis of the left kidney and was being treated with oral cefdinir, but presents this evening with right-sided flank pain with UA with >100 WBCs concerning for infection with known nephrolithiasis of the right kidney  - Urine culture from recent ED visit growing >100,000 CFUs of pan-sensitive Klebsiella pneumoniae  - Urology following patient, recommend bilateral nephrostomy exchange with IR tomorrow morning  - will start IV ceftriaxone 1 g q24h given sensitivities of Klebsiella pneumoniae but will f/u urine culture  - IV morphine 4 mg q8h PRN pain  - IV ondansetron PRN nausea/vomiting      ODESSA (acute kidney injury)  Patient with acute kidney injury likely due to post-obstructive d/t obstruction secondary to cervical cancer and fibrosis ODESSA is currently worsening. Labs reviewed- Renal function/electrolytes with Estimated Creatinine Clearance: 39.9 mL/min (A) (based on SCr of 1.8 mg/dL (H)).   - will give 1 liter of lactated ringer's solution  - Monitor urine output and serial BMP and adjust therapy as needed  - Avoid nephrotoxins and renally dose meds for GFR listed above      Cervical cancer  - patient has a history of cervical cancer and is s/p surgical resection with pelvic radiation with a history of an open transverse colon conduit and urinary diversion complicated by bowel perforation and subsequently received hemicolectomy and ileostomy creation  - Wound Care consult placed for ostomy care        Metabolic acidosis  - patient presenting with a non-anion gap metabolic acidosis with bicarbonate of 12 likely secondary to acute on chronic kidney injury  - will continue to monitor, addressing acute kidney injury as noted above    VTE Risk Mitigation (From admission, onward)          Ordered     heparin (porcine) injection 5,000 Units  Every 8 hours         08/26/22 2104     IP VTE HIGH RISK PATIENT  Once         08/26/22 2104     Place sequential compression device  Until discontinued         08/26/22 2104     IP VTE HIGH RISK PATIENT  Once         08/26/22 2104     Place sequential compression device  Until discontinued         08/26/22 2104              Code Status: FULL  Diet: NPO at midnight    Jeff Asencio MD  Department of Hospital Medicine   [facility] - Emergency Dept

## 2022-08-27 NOTE — PROGRESS NOTES
[facility] Medicine  Progress Note    Patient Name: Edita Riley  MRN: [id number]  Patient Class: OP- Observation   Admission Date: 8/26/2022  Length of Stay: 0 days  Attending Physician: Jonathan Jalloh*  Primary Care Provider: No primary care provider on file.        Subjective:     Principal Problem:<principal problem not specified>        HPI:  Edita Riley is a 57-year-old woman with a past medical history of HTN, cervical cancer s/p surgery and pelvic radiation, open transverse colon conduit urinary diversion on 9/11/2020 complicated by bowel perforation s/p take back for hemicolectomy and ileostomy creation (9/17/20). She was seen 8/24 for left-sided flank pain and was diagnosed with left-sided pyelonephritis and discharged on Cefdinir, incidental right-sided 4mm and 7mm uretal stones noted at that time. Interim urine cultures grew pan-sensitive Klebsiella pneumoniae. She is here today with right-sided flank pain for two days likey from the stones. The pain is a constant stabbing pain that is worse with movement. She has tried tylenol for the pain without relief. She reports her left-sided pain has improved. She denies fever and chills. Both nephrostomy tubes are draining appropriately, urine is non-bloody and green, stoma looks healthy with green liquid bowel contents. She denies f/v, n/v. Nephrostomy tubes last exchanged May 2022, previously exchanged every 3 months.     Per further discussion on interview with the patient, she reports compliance with her cefdinir. She noted that the right flank pain had eased since the administration of IV narcotic pain medication. She denies any discharge or erythema surrounding the site of her nephrostomy tube. She reports low-grade temperatures and chills at home. She denies any abdominal pain, nausea or vomiting. She denies any hematuria in her nephrostomy bags. She is amenable to nephrostomy tube exchange in the  morning with Interventional Radiology.     In the emergency department, the patient was given acetaminophen and a dose of IV morphine.      Overview/Hospital Course:  Patient admitted to hospital medicine. Urology consulted. IR consulted.       Interval History: No acute events overnight. C/o flank pain which is relieved with pain medication.     Review of Systems  Objective:     Vital Signs (Most Recent):  Temp: 97 °F (36.1 °C) (08/27/22 1225)  Pulse: 72 (08/27/22 1225)  Resp: 18 (08/27/22 1225)  BP: (!) 107/57 (08/27/22 1225)  SpO2: 100 % (08/27/22 1225)   Vital Signs (24h Range):  Temp:  [97 °F (36.1 °C)-98.6 °F (37 °C)] 97 °F (36.1 °C)  Pulse:  [69-95] 72  Resp:  [16-20] 18  SpO2:  [97 %-100 %] 100 %  BP: ()/(57-74) 107/57     Weight: 88.5 kg (195 lb)  Body mass index is 28.8 kg/m².    Intake/Output Summary (Last 24 hours) at 8/27/2022 1313  Last data filed at 8/27/2022 1019  Gross per 24 hour   Intake 0 ml   Output 775 ml   Net -775 ml      Physical Exam  Constitutional:       Appearance: She is not toxic-appearing or diaphoretic.   Cardiovascular:      Rate and Rhythm: Normal rate and regular rhythm.      Heart sounds: No murmur heard.    No friction rub. No gallop.   Pulmonary:      Effort: Pulmonary effort is normal. No respiratory distress.   Abdominal:      General: Abdomen is flat. There is no distension.      Palpations: Abdomen is soft.      Tenderness: There is no abdominal tenderness. There is no guarding or rebound.      Comments: Bl Nephrostomy   Musculoskeletal:      Right lower leg: No edema.      Left lower leg: No edema.   Neurological:      Mental Status: She is alert.       MELD-Na score: 14 at 5/2/2022  9:24 AM  MELD score: 14 at 5/2/2022  9:24 AM  Calculated from:  Serum Creatinine: 2.2 mg/dL at 5/2/2022  9:24 AM  Serum Sodium: 141 mmol/L (Using max of 137 mmol/L) at 5/2/2022  9:24 AM  Total Bilirubin: 0.2 mg/dL (Using min of 1 mg/dL) at 5/2/2022  9:24 AM  INR(ratio): 0.9 (Using min of  1) at 5/2/2022  9:24 AM  Age: 57 years    Significant Labs:  CBC:  Recent Labs   Lab 08/26/22 1810 08/26/22  2019 08/27/22  0254   WBC 9.05  --  8.22   HGB 12.5  --  12.5   HCT 39.5 39 39.7     --  171     CMP:  Recent Labs   Lab 08/26/22 1810 08/27/22  0254   * 133*   K 5.6* 3.7    106   CO2 12* 17*   GLU 93 87   BUN 23* 21*   CREATININE 1.8* 1.6*   CALCIUM 10.0 9.9   PROT 9.0* 7.9   ALBUMIN 3.3* 3.1*   BILITOT 0.4 0.3   ALKPHOS 149* 141*   AST 27 17   ALT 18 16   ANIONGAP 11 10     PTINR:  No results for input(s): INR in the last 48 hours.    Significant Procedures:   Dobutamine Stress Test with Color Flow: No results found for this or any previous visit.      Assessment/Plan:      * Pyelonephritis  - recently presented with pyelonephritis of the left kidney and was being treated with oral cefdinir, but presents this evening with right-sided flank pain with UA with >100 WBCs concerning for infection with known nephrolithiasis of the right kidney  - Urine culture from recent ED visit growing >100,000 CFUs of pan-sensitive Klebsiella pneumoniae  - Urology following patient, recommend bilateral nephrostomy exchange with IR  - start IV ceftriaxone 1 g q24h given sensitivities of Klebsiella pneumoniae but will f/u urine culture  - IV morphine PRN pain  - IV ondansetron PRN nausea/vomiting      Metabolic acidosis  - patient presenting with a non-anion gap metabolic acidosis with bicarbonate of 12 likely secondary to acute on chronic kidney injury  - will continue to monitor, addressing acute kidney injury as noted    Hydronephrosis        ODESSA (acute kidney injury)  Patient with acute kidney injury likely due to post-obstructive d/t obstruction secondary to cervical cancer and fibrosis ODESSA is currently worsening. Labs reviewed- Renal function/electrolytes with Estimated Creatinine Clearance: 44.9 mL/min (A) (based on SCr of 1.6 mg/dL (H)).   - gave 1 liter of lactated ringer's solution  - Monitor  urine output and serial BMP and adjust therapy as needed  - Avoid nephrotoxins and renally dose meds for GFR listed above      Cervical cancer  - patient has a history of cervical cancer and is s/p surgical resection with pelvic radiation with a history of an open transverse colon conduit and urinary diversion complicated by bowel perforation and subsequently received hemicolectomy and ileostomy creation  - Wound Care consult placed for ostomy care        VTE Risk Mitigation (From admission, onward)         Ordered     heparin (porcine) injection 5,000 Units  Every 8 hours         08/26/22 2104     IP VTE HIGH RISK PATIENT  Once         08/26/22 2104     Place sequential compression device  Until discontinued         08/26/22 2104     IP VTE HIGH RISK PATIENT  Once         08/26/22 2104     Place sequential compression device  Until discontinued         08/26/22 2104                Discharge Planning   OK:      Code Status: Full Code   Is the patient medically ready for discharge?:     Reason for patient still in hospital (select all that apply): Patient trending condition and Treatment             Jonathan Gray MD  Department of Hospital Medicine   [facility] [address] [facility] - Surgery

## 2022-08-27 NOTE — ASSESSMENT & PLAN NOTE
- patient presenting with a non-anion gap metabolic acidosis with bicarbonate of 12 likely secondary to acute on chronic kidney injury  - will continue to monitor, addressing acute kidney injury as noted above

## 2022-08-27 NOTE — NURSING
Admission: Patient arrived to the unit via stretcher and one attendant. Patient was able to slide herself from the stretcher to the assigned bed unassisted. Patient is alert and oriented x4. Skin is intact except for right side abdominal colostomy and left and right urostomy. Patient pain is 1/10 at the time of this assessment.     Nurses Note -- 4 Eyes      8/27/2022   12:42 AM      Skin assessed during: Admission      [x] No Pressure Injuries Present    []Prevention Measures Documented      [] Yes- Altered Skin Integrity Present or Discovered   [] LDA Added if Not in Epic (Describe Wound)   [] New Altered Skin Integrity was Present on Admit and Documented in LDA   [] Wound Image Taken    Wound Care Consulted? No     Attending Nurse:  Shelly Mccarty RN     Second RN/Staff Member: Priscilla Martinez RN

## 2022-08-27 NOTE — ED NOTES
I-STAT Chem-8+ Results:   Value Reference Range   Sodium 138 136-145 mmol/L   Potassium  4.0 3.5-5.1 mmol/L   Chloride 109  mmol/L   Ionized Calcium 1.36 1.06-1.42 mmol/L   CO2 (measured) 20 23-29 mmol/L   Glucose 112  mg/dL   BUN 24 6-30 mg/dL   Creatinine 2.1 0.5-1.4 mg/dL   Hematocrit 39 36-54%

## 2022-08-27 NOTE — ASSESSMENT & PLAN NOTE
- recently presented with pyelonephritis of the left kidney and was being treated with oral cefdinir, but presents this evening with right-sided flank pain with UA with >100 WBCs concerning for infection with known nephrolithiasis of the right kidney  - Urine culture from recent ED visit growing >100,000 CFUs of pan-sensitive Klebsiella pneumoniae  - Urology following patient, recommend bilateral nephrostomy exchange with IR  - start IV ceftriaxone 1 g q24h given sensitivities of Klebsiella pneumoniae but will f/u urine culture  - IV morphine PRN pain  - IV ondansetron PRN nausea/vomiting

## 2022-08-27 NOTE — ASSESSMENT & PLAN NOTE
Patient with acute kidney injury likely due to post-obstructive d/t obstruction secondary to cervical cancer and fibrosis ODESSA is currently worsening. Labs reviewed- Renal function/electrolytes with Estimated Creatinine Clearance: 39.9 mL/min (A) (based on SCr of 1.8 mg/dL (H)).   - will give 1 liter of lactated ringer's solution  - Monitor urine output and serial BMP and adjust therapy as needed  - Avoid nephrotoxins and renally dose meds for GFR listed above

## 2022-08-27 NOTE — SUBJECTIVE & OBJECTIVE
Past Medical History:   Diagnosis Date    Abnormal mammogram 8/25/2020    Acute deep vein thrombosis (DVT) of lower extremity 12/9/2020    Anxiety     Cervical cancer 2014    Chronic back pain     Depression     Diarrhea due to malabsorption 11/14/2018    DVT of lower extremity, bilateral 11/4/2020    Fibromyalgia     Fungemia 9/27/2020    Generalized abdominal pain 8/25/2020    GERD (gastroesophageal reflux disease)     Hemifacial spasm 9/16/2015    Hiatal hernia 2014    History of cervical cancer 10/11/2018    Hx of psychiatric care     Cymbalta, trazodone    Hypertension     Hypomagnesemia 11/21/2018    Lactose intolerance     Metastatic squamous cell carcinoma to lymph node 10/11/2018    Nephrostomy tube displaced     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 drug 11/14/2018    Osteoarthritis of back     Peritonitis 9/22/2020    Pseudomonas urinary tract infection 4/21/2021    Psychiatric problem     Schatzki's ring 9/14/2015    Seen on outside EGD 05/2014, underwent esophageal dilatation. Bx were negative.     Stroke 1972    Urinary tract infection associated with nephrostomy catheter 6/11/2020    Wound infection after surgery 9/24/2020       Past Surgical History:   Procedure Laterality Date    ANTEGRADE NEPHROSTOGRAPHY Bilateral 9/28/2020    Procedure: Nephrostogram - antegrade;  Surgeon: Leslie Balbuena MD;  Location: [facility] OR [address] [place] [address];  Service: Urology;  Laterality: Bilateral;    ANTEGRADE NEPHROSTOGRAPHY Left 4/20/2021    Procedure: NEPHROSTOGRAM, ANTEGRADE;  Surgeon: Leslie Balbuena MD;  Location: [facility] OR [address] [place] [address];  Service: Urology;  Laterality: Left;    BILATERAL OOPHORECTOMY  2015    CHOLECYSTECTOMY  11/09/2016    Done at [facility], path showed chronic cholecystitis and gallstones    cold knife conization  2014    COLECTOMY, RIGHT  9/17/2020    Procedure: COLECTOMY, RIGHT Extended;  Surgeon: Hammad Reynolds MD;  Location: [facility] OR [address] [place] [address];  Service: Colon and Rectal;;    COLONOSCOPY  2014    COLONOSCOPY N/A  10/24/2016    at [facility], Dr Thomas    COLONOSCOPY N/A 5/18/2018    Procedure: COLONOSCOPY;  Surgeon: Arden Gutiérrez MD;  Location: [facility] (4TH FLR);  Service: Endoscopy;  Laterality: N/A;    COLONOSCOPY N/A 7/28/2020    Procedure: COLONOSCOPY;  Surgeon: Hammad Reynolds MD;  Location: [facility] (4TH FLR);  Service: Colon and Rectal;  Laterality: N/A;  covid test [facility] 7/25    CYSTOSCOPY WITH URETEROSCOPY, RETROGRADE PYELOGRAPHY, AND INSERTION OF STENT Bilateral 3/21/2020    Procedure: CYSTOSCOPY, WITH RETROGRADE PYELOGRAM,;  Surgeon: Leslie Balbuena MD;  Location: [facility] OR 1ST FLR;  Service: Urology;  Laterality: Bilateral;    ESOPHAGOGASTRODUODENOSCOPY  2014    ESOPHAGOGASTRODUODENOSCOPY  11/18/2020    ESOPHAGOGASTRODUODENOSCOPY N/A 11/18/2020    Procedure: ESOPHAGOGASTRODUODENOSCOPY (EGD);  Surgeon: Zenon Spencer MD;  [facility];  Service: Endoscopy;  Laterality: N/A;    ESOPHAGOGASTRODUODENOSCOPY N/A 12/11/2020    Procedure: EGD (ESOPHAGOGASTRODUODENOSCOPY);  Surgeon: Juancho Muse MD;  Location: [facility] (2ND FLR);  Service: Endoscopy;  Laterality: N/A;    HYSTERECTOMY  2014    [facility] for cervical cancer    ILEOSTOMY  9/17/2020    Procedure: CREATION, ILEOSTOMY;  Surgeon: Hammad Reynolds MD;  Location: [facility] OR 2ND FLR;  Service: Colon and Rectal;;    LOOPOGRAM N/A 4/20/2021    Procedure: LOOPOGRAM;  Surgeon: Leslie Balbuena MD;  Location: [facility] OR 1ST FLR;  Service: Urology;  Laterality: N/A;    MOBILIZATION OF SPLENIC FLEXURE N/A 9/11/2020    Procedure: MOBILIZATION, COLONIC;  Surgeon: Hammad Reynolds MD;  Location: [facility] OR 2ND FLR;  Service: Colon and Rectal;  Laterality: N/A;    NEPHROSTOGRAPHY Bilateral 4/17/2021    Procedure: Nephrostogram;  Surgeon: Celeste Surgeon;  Location: [facility];  Service: Anesthesiology;  Laterality: Bilateral;    OOPHORECTOMY      RETROPERITONEAL LYMPHADENECTOMY Right 9/17/2018    Procedure: LYMPHADENECTOMY, RETROPERITONEUM;  Surgeon: Sebas Reed MD;   Location: [facility] OR [address] [place] [address];  Service: General;  Laterality: Right;  ILIAC       Review of patient's allergies indicates:   Allergen Reactions    Bee sting [allergen ext-venom-honey bee]      Rash      Grass pollen-bermuda, standard      rash       Family History       Problem Relation (Age of Onset)    Breast cancer Maternal Aunt    Cancer Father, Mother    Cervical cancer Mother    Colon cancer Father    Drug abuse Daughter, Daughter    Esophageal cancer Father    Heart disease Sister, Maternal Grandmother    Suicide Daughter            Tobacco Use    Smoking status: Former Smoker    Smokeless tobacco: Never Used   Substance and Sexual Activity    Alcohol use: No     Alcohol/week: 0.0 standard drinks    Drug use: No    Sexual activity: Yes     Partners: Male     Birth control/protection: None     Comment:  19 years since 1999       Review of Systems    Objective:     Temp:  [98.6 °F (37 °C)] 98.6 °F (37 °C)  Pulse:  [95] 95  Resp:  [18-20] 20  SpO2:  [99 %] 99 %  BP: (119)/(74) 119/74     Body mass index is 28.8 kg/m².           Drains       Drain  Duration                  Urostomy 09/11/20 0000 ileal conduit  days         Ileostomy 09/18/20  days         Nephrostomy 05/02/22 1423 Left 12 Fr. 116 days         Nephrostomy 05/02/22 1423 Right 12 Fr. 116 days                    Physical Exam  Constitutional:       Appearance: Normal appearance.   HENT:      Head: Normocephalic.   Eyes:      Pupils: Pupils are equal, round, and reactive to light.   Cardiovascular:      Rate and Rhythm: Normal rate.   Pulmonary:      Effort: Pulmonary effort is normal. No respiratory distress.   Chest:      Chest wall: No tenderness.   Abdominal:      General: There is no distension.      Tenderness: There is abdominal tenderness (RUQ). There is right CVA tenderness. There is no left CVA tenderness.      Comments: Ileostomy and urostomy draining green urine   Musculoskeletal:         General: Normal range of  motion.      Cervical back: Normal range of motion.   Neurological:      General: No focal deficit present.      Mental Status: She is alert and oriented to person, place, and time.   Psychiatric:         Mood and Affect: Mood normal.         Behavior: Behavior normal.       Significant Labs:    BMP:  Recent Labs   Lab 08/24/22  0745 08/26/22  1810    133*   K 4.5 5.6*   * 110   CO2 18* 12*   BUN 28* 23*   CREATININE 1.9* 1.8*   CALCIUM 9.7 10.0       CBC:  Recent Labs   Lab 08/24/22  0745 08/26/22  1810   WBC 13.33* 9.05   HGB 12.7 12.5   HCT 42.0 39.5    203       All pertinent labs results from the past 24 hours have been reviewed.    Significant Imaging:  CT: I have reviewed all results within the past 24 hours and my personal findings are:  pertinent findings in HPI

## 2022-08-27 NOTE — SUBJECTIVE & OBJECTIVE
Past Medical History:   Diagnosis Date    Abnormal mammogram 8/25/2020    Acute deep vein thrombosis (DVT) of lower extremity 12/9/2020    Anxiety     Cervical cancer 2014    Chronic back pain     Depression     Diarrhea due to malabsorption 11/14/2018    DVT of lower extremity, bilateral 11/4/2020    Fibromyalgia     Fungemia 9/27/2020    Generalized abdominal pain 8/25/2020    GERD (gastroesophageal reflux disease)     Hemifacial spasm 9/16/2015    Hiatal hernia 2014    History of cervical cancer 10/11/2018    Hx of psychiatric care     Cymbalta, trazodone    Hypertension     Hypomagnesemia 11/21/2018    Lactose intolerance     Metastatic squamous cell carcinoma to lymph node 10/11/2018    Nephrostomy tube displaced     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 drug 11/14/2018    Osteoarthritis of back     Peritonitis 9/22/2020    Pseudomonas urinary tract infection 4/21/2021    Psychiatric problem     Schatzki's ring 9/14/2015    Seen on outside EGD 05/2014, underwent esophageal dilatation. Bx were negative.     Stroke 1972    Urinary tract infection associated with nephrostomy catheter 6/11/2020    Wound infection after surgery 9/24/2020       Past Surgical History:   Procedure Laterality Date    ANTEGRADE NEPHROSTOGRAPHY Bilateral 9/28/2020    Procedure: Nephrostogram - antegrade;  Surgeon: Leslie Balbuena MD;  Location: [facility] OR [address] [place] [address];  Service: Urology;  Laterality: Bilateral;    ANTEGRADE NEPHROSTOGRAPHY Left 4/20/2021    Procedure: NEPHROSTOGRAM, ANTEGRADE;  Surgeon: Leslie Balbuena MD;  Location: [facility] OR [address] [place] [address];  Service: Urology;  Laterality: Left;    BILATERAL OOPHORECTOMY  2015    CHOLECYSTECTOMY  11/09/2016    Done at [facility], path showed chronic cholecystitis and gallstones    cold knife conization  2014    COLECTOMY, RIGHT  9/17/2020    Procedure: COLECTOMY, RIGHT Extended;  Surgeon: Hammad Reynolds MD;  Location: [facility] OR [address] [place] [address];  Service: Colon and Rectal;;    COLONOSCOPY  2014    COLONOSCOPY N/A  10/24/2016    at [facility], Dr Thomas    COLONOSCOPY N/A 5/18/2018    Procedure: COLONOSCOPY;  Surgeon: Arden Gutiérrez MD;  Location: [facility] (4TH FLR);  Service: Endoscopy;  Laterality: N/A;    COLONOSCOPY N/A 7/28/2020    Procedure: COLONOSCOPY;  Surgeon: Hammad Reynolds MD;  Location: [facility] (4TH FLR);  Service: Colon and Rectal;  Laterality: N/A;  covid test [facility] 7/25    CYSTOSCOPY WITH URETEROSCOPY, RETROGRADE PYELOGRAPHY, AND INSERTION OF STENT Bilateral 3/21/2020    Procedure: CYSTOSCOPY, WITH RETROGRADE PYELOGRAM,;  Surgeon: Leslie Balbuena MD;  Location: [facility] OR 1ST FLR;  Service: Urology;  Laterality: Bilateral;    ESOPHAGOGASTRODUODENOSCOPY  2014    ESOPHAGOGASTRODUODENOSCOPY  11/18/2020    ESOPHAGOGASTRODUODENOSCOPY N/A 11/18/2020    Procedure: ESOPHAGOGASTRODUODENOSCOPY (EGD);  Surgeon: Zenon Spencer MD;  Location: [facility];  Service: Endoscopy;  Laterality: N/A;    ESOPHAGOGASTRODUODENOSCOPY N/A 12/11/2020    Procedure: EGD (ESOPHAGOGASTRODUODENOSCOPY);  Surgeon: Juancho Muse MD;  Location: [facility] (2ND FLR);  Service: Endoscopy;  Laterality: N/A;    HYSTERECTOMY  2014    [facility] for cervical cancer    ILEOSTOMY  9/17/2020    Procedure: CREATION, ILEOSTOMY;  Surgeon: Hammad Reynolds MD;  Location: [facility] OR 2ND FLR;  Service: Colon and Rectal;;    LOOPOGRAM N/A 4/20/2021    Procedure: LOOPOGRAM;  Surgeon: Leslie Balbuena MD;  Location: [facility] OR 1ST FLR;  Service: Urology;  Laterality: N/A;    MOBILIZATION OF SPLENIC FLEXURE N/A 9/11/2020    Procedure: MOBILIZATION, COLONIC;  Surgeon: Hammad Reynolds MD;  Location: [facility] OR 2ND FLR;  Service: Colon and Rectal;  Laterality: N/A;    NEPHROSTOGRAPHY Bilateral 4/17/2021    Procedure: Nephrostogram;  Surgeon: Celeste Surgeon;  Location: [facility];  Service: Anesthesiology;  Laterality: Bilateral;    OOPHORECTOMY      RETROPERITONEAL LYMPHADENECTOMY Right 9/17/2018    Procedure: LYMPHADENECTOMY, RETROPERITONEUM;  Surgeon: Sebas Reed MD;   Location: [facility] OR [address] [place] [address];  Service: General;  Laterality: Right;  ILIAC       Review of patient's allergies indicates:   Allergen Reactions    Bee sting [allergen ext-venom-honey bee]      Rash      Grass pollen-bermuda, standard      rash       No current facility-administered medications on file prior to encounter.     Current Outpatient Medications on File Prior to Encounter   Medication Sig    cefdinir (OMNICEF) 300 MG capsule Take 1 capsule (300 mg total) by mouth 2 (two) times daily. for 10 days    ergocalciferol (ERGOCALCIFEROL) 50,000 unit Cap Take 1 capsule (50,000 Units total) by mouth every 7 days.    gabapentin (NEURONTIN) 100 MG capsule Take 1 capsule (100 mg total) by mouth every evening.    melatonin (MELATIN) 3 mg tablet Take 2 tablets (6 mg total) by mouth nightly as needed for Insomnia.    mirtazapine (REMERON SOL-TAB) 15 MG disintegrating tablet Dissolve 1 tablet (15 mg total) by mouth nightly.    pantoprazole (PROTONIX) 40 MG tablet Take 1 tablet (40 mg total) by mouth once daily.    sodium bicarbonate 650 MG tablet Take 2 tablets (1,300 mg total) by mouth 2 (two) times daily.     Family History       Problem Relation (Age of Onset)    Breast cancer Maternal Aunt    Cancer Father, Mother    Cervical cancer Mother    Colon cancer Father    Drug abuse Daughter, Daughter    Esophageal cancer Father    Heart disease Sister, Maternal Grandmother    Suicide Daughter          Tobacco Use    Smoking status: Former Smoker    Smokeless tobacco: Never Used   Substance and Sexual Activity    Alcohol use: No     Alcohol/week: 0.0 standard drinks    Drug use: No    Sexual activity: Yes     Partners: Male     Birth control/protection: None     Comment:  19 years since 1999     Review of Systems   Constitutional:  Positive for chills, fatigue and fever.   HENT:  Negative for congestion, nosebleeds and sore throat.    Eyes:  Negative for pain and discharge.   Respiratory:  Negative for cough, chest  tightness and shortness of breath.    Cardiovascular:  Negative for chest pain and leg swelling.   Gastrointestinal:  Negative for abdominal distention, abdominal pain and constipation.   Endocrine: Negative for polyuria.   Genitourinary:  Positive for flank pain. Negative for dysuria, frequency, hematuria, pelvic pain and urgency.   Musculoskeletal:  Positive for back pain. Negative for arthralgias and neck pain.   Skin:  Negative for color change and rash.   Hematological:  Negative for adenopathy.   Psychiatric/Behavioral:  Negative for confusion.    Objective:     Vital Signs (Most Recent):  Temp: 98.6 °F (37 °C) (08/26/22 1742)  Pulse: 95 (08/26/22 1742)  Resp: 20 (08/26/22 1909)  BP: 119/74 (08/26/22 1742)  SpO2: 99 % (08/26/22 1742) Vital Signs (24h Range):  Temp:  [98.6 °F (37 °C)] 98.6 °F (37 °C)  Pulse:  [95] 95  Resp:  [18-20] 20  SpO2:  [99 %] 99 %  BP: (119)/(74) 119/74     Weight: 88.5 kg (195 lb)  Body mass index is 28.8 kg/m².    Physical Exam  Constitutional:       General: She is not in acute distress.     Appearance: She is not toxic-appearing.      Comments: Pleasant woman in no acute distress. She is cooperative with physical examination.   HENT:      Head: Normocephalic and atraumatic.   Eyes:      General: No scleral icterus.     Extraocular Movements: Extraocular movements intact.      Conjunctiva/sclera: Conjunctivae normal.      Right eye: Right conjunctiva is not injected.      Left eye: Left conjunctiva is not injected.   Cardiovascular:      Rate and Rhythm: Normal rate.      Heart sounds: Heart sounds not distant. No murmur heard.    No friction rub.   Pulmonary:      Effort: Pulmonary effort is normal.      Breath sounds: Normal breath sounds.   Chest:      Chest wall: No tenderness.   Abdominal:      General: Abdomen is flat. The ostomy site is clean. Bowel sounds are normal.      Palpations: Abdomen is soft. There is no shifting dullness.      Tenderness: There is no abdominal  tenderness. There is no guarding or rebound.      Hernia: No hernia is present.      Comments: Colostomy sites noted in the left lower and right lower quadrants. Nephrostomy tubes noted draining little urine. All sites appear clean, dry and intact.   Musculoskeletal:      Cervical back: Normal range of motion and neck supple.      Right lower leg: No edema.      Left lower leg: No edema.   Skin:     General: Skin is warm and dry.      Comments: Mild xerosis of skin of the lower extremities bilaterally   Neurological:      General: No focal deficit present.      Mental Status: She is oriented to person, place, and time. Mental status is at baseline.      Motor: No weakness.   Psychiatric:         Mood and Affect: Mood normal.         Behavior: Behavior normal.           Significant Labs: All pertinent labs within the past 24 hours have been reviewed.  CBC:   Recent Labs   Lab 08/26/22 1810 08/26/22 2019   WBC 9.05  --    HGB 12.5  --    HCT 39.5 39     --      CMP:   Recent Labs   Lab 08/26/22 1810   *   K 5.6*      CO2 12*   GLU 93   BUN 23*   CREATININE 1.8*   CALCIUM 10.0   PROT 9.0*   ALBUMIN 3.3*   BILITOT 0.4   ALKPHOS 149*   AST 27   ALT 18   ANIONGAP 11     Urine Studies:   Recent Labs   Lab 08/26/22 1943   COLORU Orange*   APPEARANCEUA Ex.Turbid   PHUR 7.0   SPECGRAV 1.010   PROTEINUA 2+*   GLUCUA Negative   KETONESU Negative   BILIRUBINUA Negative   OCCULTUA 2+*   NITRITE Negative   LEUKOCYTESUR 3+*   RBCUA 12*   WBCUA >100*   BACTERIA Many*   SQUAMEPITHEL 0   HYALINECASTS 0       Significant Imaging: I have reviewed all pertinent imaging results/findings within the past 24 hours.

## 2022-08-27 NOTE — HPI
[facility] 57[facility] with a past medical history of HTN, cervical cancer s/p surgery and pelvic radiation, open transverse colon conduit urinary diversion on 9/11/2020 complicated by bowel perforation s/p take back for hemicolectomy and ileostomy creation (9/17/20). She was seen 8/24 for left-sided flank pain and was diagnosed with left-sided pyelonephritis and discharged on Cefdinir, incidental right-sided 4mm and 7mm uretal stones noted at that time. Interim urine cultures grew pan-sensitive Klebsiella pneumoniae. She is here today with right-sided flank pain for two days likey from the stones. The pain is a constant stabbing pain that is worse with movement. She has tried tylenol for the pain without relief. She reports her left-sided pain has improved. She denies fever and chills. Both nephrostomy tubes are draining appropriately, urine is non-bloody and green, stoma looks healthy with green liquid bowel contents. She denies f/v, n/v. Nephrostomy tubes last exchanged May 2022, previously exchanged every 3 months.     Per further discussion on interview with the patient, she reports compliance with her cefdinir. She noted that the right flank pain had eased since the administration of IV narcotic pain medication. She denies any discharge or erythema surrounding the site of her nephrostomy tube. She reports low-grade temperatures and chills at home. She denies any abdominal pain, nausea or vomiting. She denies any hematuria in her nephrostomy bags. She is amenable to nephrostomy tube exchange in the morning with Interventional Radiology.     In the emergency department, the patient was given acetaminophen and a dose of IV morphine.

## 2022-08-27 NOTE — ED PROVIDER NOTES
Encounter Date: 8/26/2022       History     Chief Complaint   Patient presents with    Back Pain     R sided back pain, recently diagnosed with kidney stone on the R side and also being treated for poly nephritis.      The patient is a 57 year old female with a history of HTN, cervical cancer s/p surgery and pelvic radiation complicated by ureteral obstruction now with bilateral nephrostomy tubes and ileostomy. She was seen 8/24 for left-sided flank pain and was diagnosed with left-sided pyelonephritis and discharged on Cefdinir, incidental right-sided 4mm and 7mm uretal stones noted at that time. Interim urine cultures grew pan-sensitive (except to nitrofurantoin) Klebsiella pneumoniae. She is here today with right-sided flank pain for two days. The pain is a constant stabbing pain that is worse with movement. She has tried tylenol for the pain without relief. She reports her left-sided pain has improved. She denies fever and chills. Both nephrostomy tubes are draining appropriately, urine is non-bloody.        Review of patient's allergies indicates:   Allergen Reactions    Bee sting [allergen ext-venom-honey bee]      Rash      Grass pollen-bermuda, standard      rash     Past Medical History:   Diagnosis Date    Abnormal mammogram 8/25/2020    Acute deep vein thrombosis (DVT) of lower extremity 12/9/2020    Anxiety     Cervical cancer 2014    Chronic back pain     Depression     Diarrhea due to malabsorption 11/14/2018    DVT of lower extremity, bilateral 11/4/2020    Fibromyalgia     Fungemia 9/27/2020    Generalized abdominal pain 8/25/2020    GERD (gastroesophageal reflux disease)     Hemifacial spasm 9/16/2015    Hiatal hernia 2014    History of cervical cancer 10/11/2018    Hx of psychiatric care     Cymbalta, trazodone    Hypertension     Hypomagnesemia 11/21/2018    Lactose intolerance     Metastatic squamous cell carcinoma to lymph node 10/11/2018    Nephrostomy tube displaced      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 drug 11/14/2018    Osteoarthritis of back     Peritonitis 9/22/2020    Pseudomonas urinary tract infection 4/21/2021    Psychiatric problem     Schatzki's ring 9/14/2015    Seen on outside EGD 05/2014, underwent esophageal dilatation. Bx were negative.     Stroke 1972    Urinary tract infection associated with nephrostomy catheter 6/11/2020    Wound infection after surgery 9/24/2020     Past Surgical History:   Procedure Laterality Date    ANTEGRADE NEPHROSTOGRAPHY Bilateral 9/28/2020    Procedure: Nephrostogram - antegrade;  Surgeon: Leslie Balbuena MD;  Location: [facility] OR [address] [place] [address];  Service: Urology;  Laterality: Bilateral;    ANTEGRADE NEPHROSTOGRAPHY Left 4/20/2021    Procedure: NEPHROSTOGRAM, ANTEGRADE;  Surgeon: Leslie Balbuena MD;  Location: [facility] OR [address] [place] [address];  Service: Urology;  Laterality: Left;    BILATERAL OOPHORECTOMY  2015    CHOLECYSTECTOMY  11/09/2016    Done at [facility], path showed chronic cholecystitis and gallstones    cold knife conization  2014    COLECTOMY, RIGHT  9/17/2020    Procedure: COLECTOMY, RIGHT Extended;  Surgeon: Hammad Reynolds MD;  Location: [facility] OR 2ND FLR;  Service: Colon and Rectal;;    COLONOSCOPY  2014    COLONOSCOPY N/A 10/24/2016    at [facility], Dr Gutiérrez    COLONOSCOPY N/A 5/18/2018    Procedure: COLONOSCOPY;  Surgeon: Arden Gutiérrez MD;  Location: [facility] (4TH FLR);  Service: Endoscopy;  Laterality: N/A;    COLONOSCOPY N/A 7/28/2020    Procedure: COLONOSCOPY;  Surgeon: Hammad Reynolds MD;  Location: [facility] (4TH FLR);  Service: Colon and Rectal;  Laterality: N/A;  covid test [place] 7/25    CYSTOSCOPY WITH URETEROSCOPY, RETROGRADE PYELOGRAPHY, AND INSERTION OF STENT Bilateral 3/21/2020    Procedure: CYSTOSCOPY, WITH RETROGRADE PYELOGRAM,;  Surgeon: Leslie Balbuena MD;  Location: [facility] OR [address] [place] [address];  Service: Urology;  Laterality: Bilateral;    ESOPHAGOGASTRODUODENOSCOPY  2014    ESOPHAGOGASTRODUODENOSCOPY   11/18/2020    ESOPHAGOGASTRODUODENOSCOPY N/A 11/18/2020    Procedure: ESOPHAGOGASTRODUODENOSCOPY (EGD);  Surgeon: Zenon Spencer MD;  Location: [facility] ENDO;  Service: Endoscopy;  Laterality: N/A;    ESOPHAGOGASTRODUODENOSCOPY N/A 12/11/2020    Procedure: EGD (ESOPHAGOGASTRODUODENOSCOPY);  Surgeon: Juancho Muse MD;  Location: [facility] (2ND FLR);  Service: Endoscopy;  Laterality: N/A;    HYSTERECTOMY  2014    TV for cervical cancer    ILEOSTOMY  9/17/2020    Procedure: CREATION, ILEOSTOMY;  Surgeon: Hammad Reynolds MD;  Location: [facility] OR 2ND FLR;  Service: Colon and Rectal;;    LOOPOGRAM N/A 4/20/2021    Procedure: LOOPOGRAM;  Surgeon: Leslie Balbuena MD;  Location: [facility] OR 1ST FLR;  Service: Urology;  Laterality: N/A;    MOBILIZATION OF SPLENIC FLEXURE N/A 9/11/2020    Procedure: MOBILIZATION, COLONIC;  Surgeon: Hammad Reynolds MD;  Location: [facility] OR 2ND FLR;  Service: Colon and Rectal;  Laterality: N/A;    NEPHROSTOGRAPHY Bilateral 4/17/2021    Procedure: Nephrostogram;  Surgeon: Celeste Surgeon;  Location: [facility];  Service: Anesthesiology;  Laterality: Bilateral;    OOPHORECTOMY      RETROPERITONEAL LYMPHADENECTOMY Right 9/17/2018    Procedure: LYMPHADENECTOMY, RETROPERITONEUM;  Surgeon: Sebas Reed MD;  Location: [facility] OR 2ND FLR;  Service: General;  Laterality: Right;  ILIAC     Family History   Problem Relation Age of Onset    Heart disease Sister     Heart disease Maternal Grandmother     Colon cancer Father     Esophageal cancer Father     Cancer Father         Lung-smoker    Cancer Mother         Cervical    Cervical cancer Mother     Breast cancer Maternal Aunt     Suicide Daughter         jumped from parking structure    Drug abuse Daughter     Drug abuse Daughter     Rectal cancer Neg Hx     Stomach cancer Neg Hx     Crohn's disease Neg Hx     Ulcerative colitis Neg Hx     Diabetes Neg Hx     Hypertension Neg Hx      Social History     Tobacco Use    Smoking  status: Former Smoker    Smokeless tobacco: Never Used   Substance Use Topics    Alcohol use: No     Alcohol/week: 0.0 standard drinks    Drug use: No     Review of Systems   Constitutional: Negative for chills, fatigue and fever.   HENT: Negative for congestion, rhinorrhea, sinus pain and sore throat.    Eyes: Negative for pain, redness and visual disturbance.   Respiratory: Negative for cough, chest tightness, shortness of breath and wheezing.    Cardiovascular: Negative for chest pain, palpitations and leg swelling.   Gastrointestinal: Negative for abdominal distention, abdominal pain, blood in stool, nausea and vomiting.        Ileostomy in place   Genitourinary: Positive for flank pain (right-sided). Negative for hematuria.        Bilateral nephrostomy tubes   Musculoskeletal: Negative for arthralgias, back pain and myalgias.   Skin: Negative for pallor and rash.   Allergic/Immunologic: Positive for environmental allergies.   Neurological: Negative for dizziness, weakness, light-headedness, numbness and headaches.   Psychiatric/Behavioral: Negative for agitation, behavioral problems and confusion. The patient is not nervous/anxious.        Physical Exam     Initial Vitals [08/26/22 1742]   BP Pulse Resp Temp SpO2   119/74 95 18 98.6 °F (37 °C) 99 %      MAP       --         Physical Exam    Nursing note and vitals reviewed.  Constitutional: She appears well-developed and well-nourished. She is not diaphoretic. No distress.   HENT:   Head: Normocephalic and atraumatic.   Eyes: Conjunctivae and EOM are normal.   Neck: Neck supple.   Normal range of motion.  Cardiovascular: Normal rate, regular rhythm and normal heart sounds.   No murmur heard.  Pulmonary/Chest: Breath sounds normal. She has no wheezes. She has no rhonchi. She has no rales.   Abdominal: Abdomen is soft. Bowel sounds are normal. She exhibits no distension. There is no abdominal tenderness.   Left-sided stoma with ileostomy bag in place without  leakage. Pink stoma without surrounding erythema. There is no guarding.   Genitourinary:    Genitourinary Comments: Bilateral nephrostomy tubes in place with clean, dry bandages. Insertion sites are non-erythematous and without purulence. Negative left-sided flank pain, Positive right-sided flank pain.     Musculoskeletal:         General: No tenderness or edema. Normal range of motion.      Cervical back: Normal range of motion and neck supple.     Neurological: She is alert and oriented to person, place, and time. She has normal strength. No sensory deficit.   Skin: Skin is warm and dry. Capillary refill takes less than 2 seconds. No rash noted. No erythema.   Psychiatric: She has a normal mood and affect. Her behavior is normal. Thought content normal.         ED Course   Procedures  Labs Reviewed   CBC W/ AUTO DIFFERENTIAL - Abnormal; Notable for the following components:       Result Value    MCHC 31.6 (*)     Mono # 1.4 (*)     Mono % 15.2 (*)     All other components within normal limits   COMPREHENSIVE METABOLIC PANEL - Abnormal; Notable for the following components:    Sodium 133 (*)     Potassium 5.6 (*)     CO2 12 (*)     BUN 23 (*)     Creatinine 1.8 (*)     Total Protein 9.0 (*)     Albumin 3.3 (*)     Alkaline Phosphatase 149 (*)     eGFR 32.1 (*)     All other components within normal limits   URINALYSIS, REFLEX TO URINE CULTURE - Abnormal; Notable for the following components:    Color, UA Orange (*)     Protein, UA 2+ (*)     Occult Blood UA 2+ (*)     Leukocytes, UA 3+ (*)     All other components within normal limits    Narrative:     Specimen Source->Urine   URINALYSIS MICROSCOPIC - Abnormal; Notable for the following components:    RBC, UA 12 (*)     WBC, UA >100 (*)     Bacteria Many (*)     All other components within normal limits    Narrative:     Specimen Source->Urine   ISTAT PROCEDURE - Abnormal; Notable for the following components:    POC Glucose 112 (*)     POC Creatinine 2.1 (*)      POC TCO2 (MEASURED) 20 (*)     All other components within normal limits   CULTURE, URINE   LACTIC ACID, PLASMA   SARS-COV-2 RDRP GENE   ISTAT CHEM8          Imaging Results    None          Medications   sodium chloride 0.9% flush 10 mL (has no administration in time range)   gabapentin capsule 100 mg (has no administration in time range)   melatonin tablet 6 mg (has no administration in time range)   sodium chloride 0.9% flush 10 mL (has no administration in time range)   glucose chewable tablet 24 g (has no administration in time range)   heparin (porcine) injection 5,000 Units (has no administration in time range)   morphine 10 mg/5 mL solution 4 mg (has no administration in time range)   ondansetron injection 4 mg (has no administration in time range)   acetaminophen tablet 650 mg (has no administration in time range)   lactated ringers bolus 1,000 mL (has no administration in time range)   cefTRIAXone (ROCEPHIN) 1 g/50 mL D5W IVPB (has no administration in time range)   morphine injection 4 mg (4 mg Intravenous Given 8/26/22 1909)   acetaminophen tablet 1,000 mg (1,000 mg Oral Given 8/26/22 1909)     Medical Decision Making:   Initial Assessment:   57 yoF with history of HTN, bilateral nephrostomy tubes 2/2 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 Patient is on appropriate antibiotics based on sensitivities and is afebrile without systemic symptoms on exam. Low suspicion for hematogenous spread. Both nephrostomy tubes continue to drain, low concern for acute obstruction of nephrostomy tubes. Her pain is likely secondary to known right-sided 7mm and 4mm kidney stones. Plan for pain management with urology consult for further management options.  Differential Diagnosis:   Right-sided nephrolithiasis  Right-sided  pyelonephritis  Nephrostomy tube obstruction  Clinical Tests:   Lab Tests: Ordered  ED Management:  Will obtain UA. IV morphine 4 mg and Tylenol PO 1 g for pain. Urology consult for management options of nephrolithiasis in the setting of bilateral nephrostomy tubes.    CMP with hyperkalemia to 5.6 (hemolysed), will recheck with iSTAT. Bicarb low at 12, likely due to ileostomy or conduit vs kidney disease; will check lactate.  Repeat iSTAT potassium 4.0.    Patient's bilateral nephrostomy tubes were last exchanged 4 months ago (usually exchanged [id number]), IR consulted. Will admit patient to hospital medicine for pain management in the setting of kidney stones, presumed IR nephrostomy tube exchange, and continued workup/management of her metabolic acidosis.                      Clinical Impression:   Final diagnoses:  [R10.9] Acute right flank pain (Primary)  [E87.5] Hyperkalemia  [E87.2] Metabolic acidosis, normal anion gap (NAG)          ED Disposition Condition    Observation               Andriy Coley MD  Resident  08/26/22 1960

## 2022-08-27 NOTE — ASSESSMENT & PLAN NOTE
- recently presented with pyelonephritis of the left kidney and was being treated with oral cefdinir, but presents this evening with right-sided flank pain with UA with >100 WBCs concerning for infection with known nephrolithiasis of the right kidney  - Urine culture from recent ED visit growing >100,000 CFUs of pan-sensitive Klebsiella pneumoniae  - Urology following patient, recommend bilateral nephrostomy exchange with IR tomorrow morning  - will start IV ceftriaxone 1 g q24h given sensitivities of Klebsiella pneumoniae but will f/u urine culture  - IV morphine 4 mg q8h PRN pain  - IV ondansetron PRN nausea/vomiting

## 2022-08-27 NOTE — ASSESSMENT & PLAN NOTE
- patient presenting with a non-anion gap metabolic acidosis with bicarbonate of 12 likely secondary to acute on chronic kidney injury  - will continue to monitor, addressing acute kidney injury as noted

## 2022-08-27 NOTE — ASSESSMENT & PLAN NOTE
- No acute urologic intervention for right kidney stone and urteral stone. She is infected and has adequate draining and flushing from the nephrostomy tubes, so no need for ureteral stents  - Nephrostomy tubes last exchanged May 2022. Recommend bilateral exchange with IR  - patient is likely being admitted to hospital medicine for acidosis  - Patient needs outpatient urology f/u to discuss stone management  - Continue abx, fu cultures and treat per sensitivities

## 2022-08-28 LAB
ALBUMIN SERPL BCP-MCNC: 2.9 G/DL (ref 3.5–5.2)
ALP SERPL-CCNC: 139 U/L (ref 55–135)
ALT SERPL W/O P-5'-P-CCNC: 14 U/L (ref 10–44)
ANION GAP SERPL CALC-SCNC: 10 MMOL/L (ref 8–16)
AST SERPL-CCNC: 13 U/L (ref 10–40)
BACTERIA UR CULT: NORMAL
BACTERIA UR CULT: NORMAL
BILIRUB SERPL-MCNC: 0.2 MG/DL (ref 0.1–1)
BUN SERPL-MCNC: 23 MG/DL (ref 6–20)
CALCIUM SERPL-MCNC: 9.7 MG/DL (ref 8.7–10.5)
CHLORIDE SERPL-SCNC: 111 MMOL/L (ref 95–110)
CO2 SERPL-SCNC: 18 MMOL/L (ref 23–29)
CREAT SERPL-MCNC: 1.6 MG/DL (ref 0.5–1.4)
ERYTHROCYTE [DISTWIDTH] IN BLOOD BY AUTOMATED COUNT: 14.1 % (ref 11.5–14.5)
EST. GFR  (NO RACE VARIABLE): 36.9 ML/MIN/1.73 M^2
GLUCOSE SERPL-MCNC: 105 MG/DL (ref 70–110)
HCT VFR BLD AUTO: 39 % (ref 37–48.5)
HGB BLD-MCNC: 12 G/DL (ref 12–16)
MAGNESIUM SERPL-MCNC: 2 MG/DL (ref 1.6–2.6)
MCH RBC QN AUTO: 27.1 PG (ref 27–31)
MCHC RBC AUTO-ENTMCNC: 30.8 G/DL (ref 32–36)
MCV RBC AUTO: 88 FL (ref 82–98)
PLATELET # BLD AUTO: 245 K/UL (ref 150–450)
PLATELET BLD QL SMEAR: NORMAL
PMV BLD AUTO: 10.9 FL (ref 9.2–12.9)
POTASSIUM SERPL-SCNC: 3.9 MMOL/L (ref 3.5–5.1)
PROT SERPL-MCNC: 7.6 G/DL (ref 6–8.4)
RBC # BLD AUTO: 4.42 M/UL (ref 4–5.4)
SODIUM SERPL-SCNC: 139 MMOL/L (ref 136–145)
WBC # BLD AUTO: 7.12 K/UL (ref 3.9–12.7)

## 2022-08-28 PROCEDURE — 25000003 PHARM REV CODE 250: Performed by: STUDENT IN AN ORGANIZED HEALTH CARE EDUCATION/TRAINING PROGRAM

## 2022-08-28 PROCEDURE — 96372 THER/PROPH/DIAG INJ SC/IM: CPT | Performed by: STUDENT IN AN ORGANIZED HEALTH CARE EDUCATION/TRAINING PROGRAM

## 2022-08-28 PROCEDURE — 99226 PR SUBSEQUENT OBSERVATION CARE,LEVEL III: ICD-10-PCS | Mod: ,,, | Performed by: STUDENT IN AN ORGANIZED HEALTH CARE EDUCATION/TRAINING PROGRAM

## 2022-08-28 PROCEDURE — 11000001 HC ACUTE MED/SURG PRIVATE ROOM

## 2022-08-28 PROCEDURE — G0378 HOSPITAL OBSERVATION PER HR: HCPCS

## 2022-08-28 PROCEDURE — 63600175 PHARM REV CODE 636 W HCPCS: Performed by: STUDENT IN AN ORGANIZED HEALTH CARE EDUCATION/TRAINING PROGRAM

## 2022-08-28 PROCEDURE — 36415 COLL VENOUS BLD VENIPUNCTURE: CPT | Performed by: STUDENT IN AN ORGANIZED HEALTH CARE EDUCATION/TRAINING PROGRAM

## 2022-08-28 PROCEDURE — 80053 COMPREHEN METABOLIC PANEL: CPT | Performed by: STUDENT IN AN ORGANIZED HEALTH CARE EDUCATION/TRAINING PROGRAM

## 2022-08-28 PROCEDURE — 85027 COMPLETE CBC AUTOMATED: CPT | Performed by: STUDENT IN AN ORGANIZED HEALTH CARE EDUCATION/TRAINING PROGRAM

## 2022-08-28 PROCEDURE — 83735 ASSAY OF MAGNESIUM: CPT | Performed by: STUDENT IN AN ORGANIZED HEALTH CARE EDUCATION/TRAINING PROGRAM

## 2022-08-28 PROCEDURE — 99226 PR SUBSEQUENT OBSERVATION CARE,LEVEL III: CPT | Mod: ,,, | Performed by: STUDENT IN AN ORGANIZED HEALTH CARE EDUCATION/TRAINING PROGRAM

## 2022-08-28 RX ORDER — SODIUM BICARBONATE 650 MG/1
650 TABLET ORAL 2 TIMES DAILY
Status: DISCONTINUED | OUTPATIENT
Start: 2022-08-28 | End: 2022-09-01 | Stop reason: HOSPADM

## 2022-08-28 RX ADMIN — GABAPENTIN 100 MG: 100 CAPSULE ORAL at 10:08

## 2022-08-28 RX ADMIN — MORPHINE SULFATE 4 MG: 4 INJECTION INTRAVENOUS at 10:08

## 2022-08-28 RX ADMIN — SODIUM BICARBONATE 650 MG TABLET 650 MG: at 10:08

## 2022-08-28 RX ADMIN — HEPARIN SODIUM 5000 UNITS: 5000 INJECTION INTRAVENOUS; SUBCUTANEOUS at 06:08

## 2022-08-28 RX ADMIN — CEFTRIAXONE 1 G: 1 INJECTION, SOLUTION INTRAVENOUS at 09:08

## 2022-08-28 NOTE — PROGRESS NOTES
[facility] Medicine  Progress Note    Patient Name: Edita Riley  MRN: [id number]  Patient Class: OP- Observation   Admission Date: 8/26/2022  Length of Stay: 0 days  Attending Physician: Jonathan Jalloh*  Primary Care Provider: No primary care provider on file.        Subjective:     Principal Problem:Pyelonephritis        HPI:  Edita Riley is a 57-year-old woman with a past medical history of HTN, cervical cancer s/p surgery and pelvic radiation, open transverse colon conduit urinary diversion on 9/11/2020 complicated by bowel perforation s/p take back for hemicolectomy and ileostomy creation (9/17/20). She was seen 8/24 for left-sided flank pain and was diagnosed with left-sided pyelonephritis and discharged on Cefdinir, incidental right-sided 4mm and 7mm uretal stones noted at that time. Interim urine cultures grew pan-sensitive Klebsiella pneumoniae. She is here today with right-sided flank pain for two days likey from the stones. The pain is a constant stabbing pain that is worse with movement. She has tried tylenol for the pain without relief. She reports her left-sided pain has improved. She denies fever and chills. Both nephrostomy tubes are draining appropriately, urine is non-bloody and green, stoma looks healthy with green liquid bowel contents. She denies f/v, n/v. Nephrostomy tubes last exchanged May 2022, previously exchanged every 3 months.     Per further discussion on interview with the patient, she reports compliance with her cefdinir. She noted that the right flank pain had eased since the administration of IV narcotic pain medication. She denies any discharge or erythema surrounding the site of her nephrostomy tube. She reports low-grade temperatures and chills at home. She denies any abdominal pain, nausea or vomiting. She denies any hematuria in her nephrostomy bags. She is amenable to nephrostomy tube exchange in the morning with  Interventional Radiology.     In the emergency department, the patient was given acetaminophen and a dose of IV morphine.      Overview/Hospital Course:  Patient admitted to hospital medicine. Urology consulted. IR consulted.       Interval History: Patient feels pain controlled. No new complaints.  No acute events.   Plan for nephrostomy exchange tomorrow    Review of Systems  Objective:     Vital Signs (Most Recent):  Temp: 97.6 °F (36.4 °C) (08/28/22 1227)  Pulse: 86 (08/28/22 1227)  Resp: 18 (08/28/22 1227)  BP: (!) 105/59 (08/28/22 1227)  SpO2: 96 % (08/28/22 1227)   Vital Signs (24h Range):  Temp:  [97.6 °F (36.4 °C)-98.8 °F (37.1 °C)] 97.6 °F (36.4 °C)  Pulse:  [80-88] 86  Resp:  [16-18] 18  SpO2:  [96 %-100 %] 96 %  BP: ()/(55-67) 105/59     Weight: 88.5 kg (195 lb)  Body mass index is 28.8 kg/m².    Intake/Output Summary (Last 24 hours) at 8/28/2022 1257  Last data filed at 8/28/2022 1233  Gross per 24 hour   Intake 1550 ml   Output 1915 ml   Net -365 ml      Physical Exam  Constitutional:       Appearance: She is not toxic-appearing or diaphoretic.   Cardiovascular:      Rate and Rhythm: Normal rate and regular rhythm.      Heart sounds: No murmur heard.    No friction rub. No gallop.   Pulmonary:      Effort: Pulmonary effort is normal. No respiratory distress.   Abdominal:      General: Abdomen is flat. There is no distension.      Palpations: Abdomen is soft.      Tenderness: There is no abdominal tenderness. There is no guarding or rebound.      Comments: Bl Nephrostomy   Musculoskeletal:      Right lower leg: No edema.      Left lower leg: No edema.   Neurological:      Mental Status: She is alert.       MELD-Na score: 14 at 5/2/2022  9:24 AM  MELD score: 14 at 5/2/2022  9:24 AM  Calculated from:  Serum Creatinine: 2.2 mg/dL at 5/2/2022  9:24 AM  Serum Sodium: 141 mmol/L (Using max of 137 mmol/L) at 5/2/2022  9:24 AM  Total Bilirubin: 0.2 mg/dL (Using min of 1 mg/dL) at 5/2/2022  9:24  AM  INR(ratio): 0.9 (Using min of 1) at 5/2/2022  9:24 AM  Age: 57 years    Significant Labs:  CBC:  Recent Labs   Lab 08/26/22 1810 08/26/22 2019 08/27/22 0254 08/28/22  0411   WBC 9.05  --  8.22 7.12   HGB 12.5  --  12.5 12.0   HCT 39.5 39 39.7 39.0     --  171 245     CMP:  Recent Labs   Lab 08/26/22 1810 08/27/22 0254 08/28/22  0411   * 133* 139   K 5.6* 3.7 3.9    106 111*   CO2 12* 17* 18*   GLU 93 87 105   BUN 23* 21* 23*   CREATININE 1.8* 1.6* 1.6*   CALCIUM 10.0 9.9 9.7   PROT 9.0* 7.9 7.6   ALBUMIN 3.3* 3.1* 2.9*   BILITOT 0.4 0.3 0.2   ALKPHOS 149* 141* 139*   AST 27 17 13   ALT 18 16 14   ANIONGAP 11 10 10     PTINR:  No results for input(s): INR in the last 48 hours.    Significant Procedures:   Dobutamine Stress Test with Color Flow: No results found for this or any previous visit.      Assessment/Plan:      * Pyelonephritis  - recently presented with pyelonephritis of the left kidney and was being treated with oral cefdinir, but presents this evening with right-sided flank pain with UA with >100 WBCs concerning for infection with known nephrolithiasis of the right kidney  - Urine culture from recent ED visit growing >100,000 CFUs of pan-sensitive Klebsiella pneumoniae  - start IV ceftriaxone 1 g q24h given sensitivities of Klebsiella pneumoniae but will f/u urine culture  - IV morphine PRN pain  - IV ondansetron PRN nausea/vomiting  - Urology following patient, recommend bilateral nephrostomy exchange with IR - plan 8/29, npo midnight    Metabolic acidosis  - patient presenting with a non-anion gap metabolic acidosis with bicarbonate of 12 likely secondary to acute on chronic kidney injury  - will continue to monitor, addressing acute kidney injury as noted  - continue na bicarb    Hydronephrosis        ODESSA (acute kidney injury)  Patient with acute kidney injury likely due to post-obstructive d/t obstruction secondary to cervical cancer and fibrosis ODESSA is currently worsening.  Labs reviewed- Renal function/electrolytes with Estimated Creatinine Clearance: 44.9 mL/min (A) (based on SCr of 1.6 mg/dL (H)).   - gave 1 liter of lactated ringer's solution  - Monitor urine output and serial BMP and adjust therapy as needed  - Avoid nephrotoxins and renally dose meds for GFR listed above      Cervical cancer  - patient has a history of cervical cancer and is s/p surgical resection with pelvic radiation with a history of an open transverse colon conduit and urinary diversion complicated by bowel perforation and subsequently received hemicolectomy and ileostomy creation  - Wound Care consult placed for ostomy care        VTE Risk Mitigation (From admission, onward)         Ordered     IP VTE HIGH RISK PATIENT  Once         08/26/22 2104     Place sequential compression device  Until discontinued         08/26/22 2104     IP VTE HIGH RISK PATIENT  Once         08/26/22 2104     Place sequential compression device  Until discontinued         08/26/22 2104                Discharge Planning   OK:      Code Status: Full Code   Is the patient medically ready for discharge?:     Reason for patient still in hospital (select all that apply): Patient trending condition, Treatment and Consult recommendations             Jonathan Gray MD  Department of Hospital Medicine   [facility] - Surgery

## 2022-08-28 NOTE — PLAN OF CARE
Patient AAOX4, call light and belongings in reach, siderails up x2.  Bilateral nephrostomy tubes intact, urostomy tube intact and output charted, colostomy tube intact and output charted, pain controlled with current regimen.  Remains free from falls and safety maintained thi shift.

## 2022-08-28 NOTE — ASSESSMENT & PLAN NOTE
- patient presenting with a non-anion gap metabolic acidosis with bicarbonate of 12 likely secondary to acute on chronic kidney injury  - will continue to monitor, addressing acute kidney injury as noted  - continue na bicarb

## 2022-08-28 NOTE — SUBJECTIVE & OBJECTIVE
Interval History: Patient feels pain controlled. No new complaints.  No acute events.   Plan for nephrostomy exchange tomorrow    Review of Systems  Objective:     Vital Signs (Most Recent):  Temp: 97.6 °F (36.4 °C) (08/28/22 1227)  Pulse: 86 (08/28/22 1227)  Resp: 18 (08/28/22 1227)  BP: (!) 105/59 (08/28/22 1227)  SpO2: 96 % (08/28/22 1227)   Vital Signs (24h Range):  Temp:  [97.6 °F (36.4 °C)-98.8 °F (37.1 °C)] 97.6 °F (36.4 °C)  Pulse:  [80-88] 86  Resp:  [16-18] 18  SpO2:  [96 %-100 %] 96 %  BP: ()/(55-67) 105/59     Weight: 88.5 kg (195 lb)  Body mass index is 28.8 kg/m².    Intake/Output Summary (Last 24 hours) at 8/28/2022 1257  Last data filed at 8/28/2022 1233  Gross per 24 hour   Intake 1550 ml   Output 1915 ml   Net -365 ml      Physical Exam  Constitutional:       Appearance: She is not toxic-appearing or diaphoretic.   Cardiovascular:      Rate and Rhythm: Normal rate and regular rhythm.      Heart sounds: No murmur heard.    No friction rub. No gallop.   Pulmonary:      Effort: Pulmonary effort is normal. No respiratory distress.   Abdominal:      General: Abdomen is flat. There is no distension.      Palpations: Abdomen is soft.      Tenderness: There is no abdominal tenderness. There is no guarding or rebound.      Comments: Bl Nephrostomy   Musculoskeletal:      Right lower leg: No edema.      Left lower leg: No edema.   Neurological:      Mental Status: She is alert.       MELD-Na score: 14 at 5/2/2022  9:24 AM  MELD score: 14 at 5/2/2022  9:24 AM  Calculated from:  Serum Creatinine: 2.2 mg/dL at 5/2/2022  9:24 AM  Serum Sodium: 141 mmol/L (Using max of 137 mmol/L) at 5/2/2022  9:24 AM  Total Bilirubin: 0.2 mg/dL (Using min of 1 mg/dL) at 5/2/2022  9:24 AM  INR(ratio): 0.9 (Using min of 1) at 5/2/2022  9:24 AM  Age: 57 years    Significant Labs:  CBC:  Recent Labs   Lab 08/26/22  1810 08/26/22 2019 08/27/22  0254 08/28/22  0411   WBC 9.05  --  8.22 7.12   HGB 12.5  --  12.5 12.0   HCT 39.5  39 39.7 39.0     --  171 245     CMP:  Recent Labs   Lab 08/26/22  1810 08/27/22  0254 08/28/22  0411   * 133* 139   K 5.6* 3.7 3.9    106 111*   CO2 12* 17* 18*   GLU 93 87 105   BUN 23* 21* 23*   CREATININE 1.8* 1.6* 1.6*   CALCIUM 10.0 9.9 9.7   PROT 9.0* 7.9 7.6   ALBUMIN 3.3* 3.1* 2.9*   BILITOT 0.4 0.3 0.2   ALKPHOS 149* 141* 139*   AST 27 17 13   ALT 18 16 14   ANIONGAP 11 10 10     PTINR:  No results for input(s): INR in the last 48 hours.    Significant Procedures:   Dobutamine Stress Test with Color Flow: No results found for this or any previous visit.

## 2022-08-28 NOTE — ASSESSMENT & PLAN NOTE
- recently presented with pyelonephritis of the left kidney and was being treated with oral cefdinir, but presents this evening with right-sided flank pain with UA with >100 WBCs concerning for infection with known nephrolithiasis of the right kidney  - Urine culture from recent ED visit growing >100,000 CFUs of pan-sensitive Klebsiella pneumoniae  - start IV ceftriaxone 1 g q24h given sensitivities of Klebsiella pneumoniae but will f/u urine culture  - IV morphine PRN pain  - IV ondansetron PRN nausea/vomiting  - Urology following patient, recommend bilateral nephrostomy exchange with IR - plan 8/29, npo midnight

## 2022-08-29 LAB
ALBUMIN SERPL BCP-MCNC: 2.8 G/DL (ref 3.5–5.2)
ALP SERPL-CCNC: 156 U/L (ref 55–135)
ALT SERPL W/O P-5'-P-CCNC: 15 U/L (ref 10–44)
ANION GAP SERPL CALC-SCNC: 7 MMOL/L (ref 8–16)
AST SERPL-CCNC: 17 U/L (ref 10–40)
BILIRUB SERPL-MCNC: 0.2 MG/DL (ref 0.1–1)
BUN SERPL-MCNC: 21 MG/DL (ref 6–20)
CALCIUM SERPL-MCNC: 9.7 MG/DL (ref 8.7–10.5)
CHLORIDE SERPL-SCNC: 113 MMOL/L (ref 95–110)
CO2 SERPL-SCNC: 16 MMOL/L (ref 23–29)
CREAT SERPL-MCNC: 1.4 MG/DL (ref 0.5–1.4)
ERYTHROCYTE [DISTWIDTH] IN BLOOD BY AUTOMATED COUNT: 14.1 % (ref 11.5–14.5)
EST. GFR  (NO RACE VARIABLE): 43.3 ML/MIN/1.73 M^2
GLUCOSE SERPL-MCNC: 96 MG/DL (ref 70–110)
HCT VFR BLD AUTO: 37.8 % (ref 37–48.5)
HGB BLD-MCNC: 11.8 G/DL (ref 12–16)
INR PPP: 0.9 (ref 0.8–1.2)
MAGNESIUM SERPL-MCNC: 2 MG/DL (ref 1.6–2.6)
MCH RBC QN AUTO: 27.8 PG (ref 27–31)
MCHC RBC AUTO-ENTMCNC: 31.2 G/DL (ref 32–36)
MCV RBC AUTO: 89 FL (ref 82–98)
PLATELET # BLD AUTO: 279 K/UL (ref 150–450)
PMV BLD AUTO: 10.4 FL (ref 9.2–12.9)
POTASSIUM SERPL-SCNC: 4.1 MMOL/L (ref 3.5–5.1)
PROT SERPL-MCNC: 7.5 G/DL (ref 6–8.4)
PROTHROMBIN TIME: 9.9 SEC (ref 9–12.5)
RBC # BLD AUTO: 4.24 M/UL (ref 4–5.4)
SODIUM SERPL-SCNC: 136 MMOL/L (ref 136–145)
WBC # BLD AUTO: 7.84 K/UL (ref 3.9–12.7)

## 2022-08-29 PROCEDURE — 25000003 PHARM REV CODE 250: Performed by: STUDENT IN AN ORGANIZED HEALTH CARE EDUCATION/TRAINING PROGRAM

## 2022-08-29 PROCEDURE — 63600175 PHARM REV CODE 636 W HCPCS: Performed by: STUDENT IN AN ORGANIZED HEALTH CARE EDUCATION/TRAINING PROGRAM

## 2022-08-29 PROCEDURE — 25500020 PHARM REV CODE 255: Performed by: INTERNAL MEDICINE

## 2022-08-29 PROCEDURE — 11000001 HC ACUTE MED/SURG PRIVATE ROOM

## 2022-08-29 PROCEDURE — 36415 COLL VENOUS BLD VENIPUNCTURE: CPT | Performed by: INTERNAL MEDICINE

## 2022-08-29 PROCEDURE — 63600175 PHARM REV CODE 636 W HCPCS: Performed by: INTERNAL MEDICINE

## 2022-08-29 PROCEDURE — 99499 UNLISTED E&M SERVICE: CPT | Mod: ,,, | Performed by: PHYSICIAN ASSISTANT

## 2022-08-29 PROCEDURE — 85610 PROTHROMBIN TIME: CPT | Performed by: INTERNAL MEDICINE

## 2022-08-29 PROCEDURE — 85027 COMPLETE CBC AUTOMATED: CPT | Performed by: STUDENT IN AN ORGANIZED HEALTH CARE EDUCATION/TRAINING PROGRAM

## 2022-08-29 PROCEDURE — 83735 ASSAY OF MAGNESIUM: CPT | Performed by: STUDENT IN AN ORGANIZED HEALTH CARE EDUCATION/TRAINING PROGRAM

## 2022-08-29 PROCEDURE — 99499 NO LOS: ICD-10-PCS | Mod: ,,, | Performed by: PHYSICIAN ASSISTANT

## 2022-08-29 PROCEDURE — 36415 COLL VENOUS BLD VENIPUNCTURE: CPT | Performed by: STUDENT IN AN ORGANIZED HEALTH CARE EDUCATION/TRAINING PROGRAM

## 2022-08-29 PROCEDURE — 80053 COMPREHEN METABOLIC PANEL: CPT | Performed by: STUDENT IN AN ORGANIZED HEALTH CARE EDUCATION/TRAINING PROGRAM

## 2022-08-29 PROCEDURE — 99233 SBSQ HOSP IP/OBS HIGH 50: CPT | Mod: 95,,, | Performed by: INTERNAL MEDICINE

## 2022-08-29 PROCEDURE — 99233 PR SUBSEQUENT HOSPITAL CARE,LEVL III: ICD-10-PCS | Mod: 95,,, | Performed by: INTERNAL MEDICINE

## 2022-08-29 RX ORDER — MORPHINE SULFATE 4 MG/ML
4 INJECTION, SOLUTION INTRAMUSCULAR; INTRAVENOUS EVERY 4 HOURS PRN
Status: DISCONTINUED | OUTPATIENT
Start: 2022-08-29 | End: 2022-08-30

## 2022-08-29 RX ORDER — MIDAZOLAM HYDROCHLORIDE 1 MG/ML
INJECTION INTRAMUSCULAR; INTRAVENOUS
Status: DISCONTINUED | OUTPATIENT
Start: 2022-08-29 | End: 2022-09-01 | Stop reason: HOSPADM

## 2022-08-29 RX ORDER — FENTANYL CITRATE 50 UG/ML
INJECTION, SOLUTION INTRAMUSCULAR; INTRAVENOUS
Status: DISCONTINUED | OUTPATIENT
Start: 2022-08-29 | End: 2022-08-30

## 2022-08-29 RX ORDER — CEFTRIAXONE 2 G/1
INJECTION, POWDER, FOR SOLUTION INTRAMUSCULAR; INTRAVENOUS
Status: DISCONTINUED | OUTPATIENT
Start: 2022-08-29 | End: 2022-09-01 | Stop reason: HOSPADM

## 2022-08-29 RX ADMIN — CEFTRIAXONE SODIUM 1 G: 2 INJECTION, POWDER, FOR SOLUTION INTRAMUSCULAR; INTRAVENOUS at 12:08

## 2022-08-29 RX ADMIN — IOHEXOL 30 ML: 300 INJECTION, SOLUTION INTRAVENOUS at 01:08

## 2022-08-29 RX ADMIN — FENTANYL CITRATE 50 MCG: 50 INJECTION, SOLUTION INTRAMUSCULAR; INTRAVENOUS at 12:08

## 2022-08-29 RX ADMIN — MORPHINE SULFATE 4 MG: 4 INJECTION INTRAVENOUS at 07:08

## 2022-08-29 RX ADMIN — MIDAZOLAM HYDROCHLORIDE 1 MG: 1 INJECTION, SOLUTION INTRAMUSCULAR; INTRAVENOUS at 01:08

## 2022-08-29 RX ADMIN — MIDAZOLAM HYDROCHLORIDE 1 MG: 1 INJECTION, SOLUTION INTRAMUSCULAR; INTRAVENOUS at 12:08

## 2022-08-29 RX ADMIN — FENTANYL CITRATE 50 MCG: 50 INJECTION, SOLUTION INTRAMUSCULAR; INTRAVENOUS at 01:08

## 2022-08-29 RX ADMIN — SODIUM BICARBONATE 650 MG TABLET 650 MG: at 09:08

## 2022-08-29 RX ADMIN — FENTANYL CITRATE 25 MCG: 0.05 INJECTION, SOLUTION INTRAMUSCULAR; INTRAVENOUS at 01:08

## 2022-08-29 RX ADMIN — CEFTRIAXONE 1 G: 1 INJECTION, SOLUTION INTRAVENOUS at 09:08

## 2022-08-29 RX ADMIN — GABAPENTIN 100 MG: 100 CAPSULE ORAL at 09:08

## 2022-08-29 NOTE — PLAN OF CARE
Pt arrived to IR rom 190 via stretcher w/ RNJ, AAO x4, denies pain, name//allergies/procedure verified, denies pain. Pt will be monitored by RN @ bedside throughout procedure/sedation.

## 2022-08-29 NOTE — PLAN OF CARE
Discussed d/c plan with MD during virtual huddle. OK 8/30/22 at this time. MD reports potential for IVAB at d/c. SW to send referral to [facility] to follow for needs. Will continue to monitor.

## 2022-08-29 NOTE — DISCHARGE INSTRUCTIONS
Interventional radiology  For questions or concerns call: DIANA MON-FRI 8 AM- 5PM [phone].   Radiology resident on call [phone].  For immediate concerns that are not emergent, you may call our radiology clinic at: [phone].

## 2022-08-29 NOTE — ASSESSMENT & PLAN NOTE
ID consult received. Chart reviewed. Attempted to see patient today though she was off the floor in IR.     Continue Ceftriaxone. Will see patient tomorrow with full consult note and recommendations to follow.      In the interim, please call or secure chat with any questions.    Thank you,  Megha Joseph PA-C  ID VAISHNAVI Spectra: [phone]

## 2022-08-29 NOTE — ASSESSMENT & PLAN NOTE
Estimated Creatinine Clearance: 51.3 mL/min (based on SCr of 1.4 mg/dL).  Patient with acute kidney injury likely due to post-obstructive d/t obstruction secondary to cervical cancer and fibrosis ODESSA is currently worsening. Labs reviewed- Renal function/electrolytes with Estimated Creatinine Clearance: 51.3 mL/min (based on SCr of 1.4 mg/dL).   - gave 1 liter of lactated ringer's solution  - Monitor urine output and serial BMP and adjust therapy as needed  - Avoid nephrotoxins and renally dose meds for GFR listed above

## 2022-08-29 NOTE — PLAN OF CARE
Patient remains in IR following Nephrostomy tube exchange. Unable to complete d/c planning assessment. Spouse unavailable by phone. Will follow-up with patient in am.

## 2022-08-29 NOTE — NURSING
Procedural recovery complete. VSS. Rudi neph tubes remain patent to gravity drainage. Urine remains red tinged. Cardiac diet resumed per Dr. Culp. Patient tolerated PO nutrition and fluids without c/o nausea. Report called to primary RN Maria Fernanda. Awaiting patient transport.

## 2022-08-29 NOTE — CONSULTS
Thank you for your consult to [facility]. We have reviewed the patient chart. This patient does meet criteria for [facility] service at this time. Will assume care on 08/29/22 at 7AM.    Kiki Culp MD

## 2022-08-29 NOTE — CONSULTS
[facility]  Infectious Disease  Consult Note    Patient Name: Edita Riley  MRN: [id number]  Admission Date: 8/26/2022  Hospital Length of Stay: 0 days  Attending Physician: Kiki Culp MD  Primary Care Provider: No primary care provider on file.         Inpatient consult to Infectious Diseases  Consult performed by: Megha Joseph PA-C  Consult ordered by: Kiki Culp MD        Assessment/Plan:     * Pyelonephritis  ID consult received. Chart reviewed. Attempted to see patient today though she was off the floor in IR.     Continue Ceftriaxone. Will see patient tomorrow with full consult note and recommendations to follow.      In the interim, please call or secure chat with any questions.    Thank you,  Megha Joseph PA-C  ID VAISHNAVI Spectra: [phone]

## 2022-08-29 NOTE — NURSING
Patient received in MPU bay 7 s/p bilateral nephrostomy tube exchange. VSS. AOx4. Patient has no complaints. Light red hematuria noted in both drainage bags. Patient to recover x2 hours and return to room 505.

## 2022-08-29 NOTE — PROGRESS NOTES
[facility] Medicine  Telemedicine Progress Note    Patient Name: Edita Riley  MRN: [id number]  Patient Class: IP- Inpatient   Admission Date: 8/26/2022  Length of Stay: 0 days  Attending Physician: Kiki Culp MD  Primary Care Provider: No primary care provider on file.          Subjective:     Principal Problem:Pyelonephritis        HPI:  Edita Riley is a 57-year-old woman with a past medical history of HTN, cervical cancer s/p surgery and pelvic radiation, open transverse colon conduit urinary diversion on 9/11/2020 complicated by bowel perforation s/p take back for hemicolectomy and ileostomy creation (9/17/20). She was seen 8/24 for left-sided flank pain and was diagnosed with left-sided pyelonephritis and discharged on Cefdinir, incidental right-sided 4mm and 7mm uretal stones noted at that time. Interim urine cultures grew pan-sensitive Klebsiella pneumoniae. She is here today with right-sided flank pain for two days likey from the stones. The pain is a constant stabbing pain that is worse with movement. She has tried tylenol for the pain without relief. She reports her left-sided pain has improved. She denies fever and chills. Both nephrostomy tubes are draining appropriately, urine is non-bloody and green, stoma looks healthy with green liquid bowel contents. She denies f/v, n/v. Nephrostomy tubes last exchanged May 2022, previously exchanged every 3 months.     Per further discussion on interview with the patient, she reports compliance with her cefdinir. She noted that the right flank pain had eased since the administration of IV narcotic pain medication. She denies any discharge or erythema surrounding the site of her nephrostomy tube. She reports low-grade temperatures and chills at home. She denies any abdominal pain, nausea or vomiting. She denies any hematuria in her nephrostomy bags. She is amenable to nephrostomy tube exchange in the morning with  Interventional Radiology.     In the emergency department, the patient was given acetaminophen and a dose of IV morphine.      Overview/Hospital Course:  Patient admitted to hospital medicine. Urology consulted. IR consulted.         This encounter was provided through telemedicine.  Patient was transferred to the telemedicine service on:  08/29/2022   The patient location is: 505/505 A admitted 8/26/2022  6:31 PM.  Present with the patient at the time of the telemed/virtual assessment: Telepresenter    Interval History/Overnight Events:   Clinical record since admit reviewed.  Uneventful night; back pain controlled with morphine IV; urine concentrated on right nephrostomy; awaiting nephrostomy tube exchange    Poor appetite - will start IVF while NPO - no dysphagia or odynophagia    Review of Systems   Constitutional:  Positive for activity change, appetite change and fatigue. Negative for fever.   Respiratory:  Negative for cough and shortness of breath.    Gastrointestinal:  Negative for diarrhea and vomiting.   Genitourinary:  Positive for flank pain.      Inpatient Medications:  Scheduled Meds:   cefTRIAXone (ROCEPHIN) IVPB  1 g Intravenous Q24H    gabapentin  100 mg Oral QHS    sodium bicarbonate  650 mg Oral BID     Continuous Infusions:  PRN Meds:.acetaminophen, glucose, melatonin, morphine, ondansetron, sodium chloride 0.9%, sodium chloride 0.9%      Objective:     Temp:  [97.3 °F (36.3 °C)-98.4 °F (36.9 °C)] 97.3 °F (36.3 °C)  Pulse:  [80-86] 84  Resp:  [16-20] 20  SpO2:  [95 %-100 %] 99 %  BP: ()/(55-79) 107/67      Intake/Output Summary (Last 24 hours) at 8/29/2022 0945  Last data filed at 8/29/2022 0900  Gross per 24 hour   Intake 650 ml   Output 950 ml   Net -300 ml        Body mass index is 28.8 kg/m².    Physical Exam  Vitals and nursing note reviewed.   Constitutional:       General: She is not in acute distress.     Appearance: She is ill-appearing.   HENT:      Head: Normocephalic and  atraumatic.   Eyes:      General: No scleral icterus.        Right eye: No discharge.         Left eye: No discharge.      Extraocular Movements: Extraocular movements intact.   Cardiovascular:      Rate and Rhythm: Normal rate.   Pulmonary:      Effort: Pulmonary effort is normal. No tachypnea or respiratory distress.   Neurological:      General: No focal deficit present.      Mental Status: She is alert and oriented to person, place, and time.      Cranial Nerves: No cranial nerve deficit.      Motor: No weakness.   Psychiatric:         Mood and Affect: Mood and affect normal.         Speech: Speech normal.         Behavior: Behavior is cooperative.         Thought Content: Thought content normal.        Labs:  Recent Results (from the past 24 hour(s))   Comprehensive Metabolic Panel (CMP)    Collection Time: 08/29/22  5:59 AM   Result Value Ref Range    Sodium 136 136 - 145 mmol/L    Potassium 4.1 3.5 - 5.1 mmol/L    Chloride 113 (H) 95 - 110 mmol/L    CO2 16 (L) 23 - 29 mmol/L    Glucose 96 70 - 110 mg/dL    BUN 21 (H) 6 - 20 mg/dL    Creatinine 1.4 0.5 - 1.4 mg/dL    Calcium 9.7 8.7 - 10.5 mg/dL    Total Protein 7.5 6.0 - 8.4 g/dL    Albumin 2.8 (L) 3.5 - 5.2 g/dL    Total Bilirubin 0.2 0.1 - 1.0 mg/dL    Alkaline Phosphatase 156 (H) 55 - 135 U/L    AST 17 10 - 40 U/L    ALT 15 10 - 44 U/L    Anion Gap 7 (L) 8 - 16 mmol/L    eGFR 43.3 (A) >60 mL/min/1.73 m^2   Magnesium    Collection Time: 08/29/22  5:59 AM   Result Value Ref Range    Magnesium 2.0 1.6 - 2.6 mg/dL   CBC Without Differential    Collection Time: 08/29/22  5:59 AM   Result Value Ref Range    WBC 7.84 3.90 - 12.70 K/uL    RBC 4.24 4.00 - 5.40 M/uL    Hemoglobin 11.8 (L) 12.0 - 16.0 g/dL    Hematocrit 37.8 37.0 - 48.5 %    MCV 89 82 - 98 fL    MCH 27.8 27.0 - 31.0 pg    MCHC 31.2 (L) 32.0 - 36.0 g/dL    RDW 14.1 11.5 - 14.5 %    Platelets 279 150 - 450 K/uL    MPV 10.4 9.2 - 12.9 fL   Protime-INR    Collection Time: 08/29/22  8:54 AM   Result Value  Ref Range    Prothrombin Time 9.9 9.0 - 12.5 sec    INR 0.9 0.8 - 1.2        Lab Results   Component Value Date    [id number] Negative 08/26/2022       Recent Labs   Lab 08/24/22  0745 08/26/22  1810 08/26/22 2019 08/27/22 0254 08/28/22  0411 08/29/22  0559   WBC 13.33* 9.05  --  8.22 7.12 7.84   LYMPH 10.0*  1.3 18.5  1.7  --   --   --   --    HGB 12.7 12.5  --  12.5 12.0 11.8*   HCT 42.0 39.5   < > 39.7 39.0 37.8    203  --  171 245 279    < > = values in this interval not displayed.     Recent Labs   Lab 08/27/22 0254 08/28/22 0411 08/29/22  0559   * 139 136   K 3.7 3.9 4.1    111* 113*   CO2 17* 18* 16*   BUN 21* 23* 21*   CREATININE 1.6* 1.6* 1.4   GLU 87 105 96   CALCIUM 9.9 9.7 9.7   MG 2.0 2.0 2.0     Recent Labs   Lab 08/27/22 0254 08/28/22  0411 08/29/22  0559 08/29/22  0854   ALKPHOS 141* 139* 156*  --    ALT 16 14 15  --    AST 17 13 17  --    ALBUMIN 3.1* 2.9* 2.8*  --    PROT 7.9 7.6 7.5  --    BILITOT 0.3 0.2 0.2  --    INR  --   --   --  0.9        No results for input(s): DDIMER, FERRITIN, CRP, LDH, BNP, TROPONINI, CPK in the last 72 hours.    Invalid input(s): PROCALCITONIN    All labs within the last 24 hours were reviewed.     Microbiology:  Microbiology Results (last 7 days)       Procedure Component Value Units Date/Time    Urine culture [[id number]] Collected: 08/26/22 1943    Order Status: Completed Specimen: Urine Updated: 08/28/22 0002     Urine Culture, Routine Multiple organisms isolated. None in predominance.  Repeat if      clinically necessary.    Narrative:      Specimen Source->Urine              Imaging  ECG Results              EKG 12-lead (Final result)  Result time 08/27/22 14:23:35      Final result by Interface, Lab In [facility] (08/27/22 14:23:35)                   Narrative:    Test Reason : E87.5,    Vent. Rate : 082 BPM     Atrial Rate : 082 BPM     P-R Int : 132 ms          QRS Dur : 086 ms      QT Int : 372 ms       P-R-T Axes : 080 091 085  degrees     QTc Int : 434 ms    Normal sinus rhythm  Rightward axis  Borderline Abnormal ECG  When compared with ECG of 13-SEP-2021 16:03,  No significant change was found  Confirmed by CHINO BACK MD ([id number]) on 8/27/2022 2:23:26 PM    Referred By: MERRY   SELF           Confirmed By:CHINO BACK MD                                    Results for orders placed during the hospital encounter of 04/16/21    Echo Color Flow Doppler? Yes    Interpretation Summary  · The left ventricle is normal in size with normal systolic function.  · The estimated ejection fraction is 65%.  · Normal left ventricular diastolic function.  · Normal right ventricular size with normal right ventricular systolic function.  · Normal central venous pressure (3 mmHg).      CT Abdomen Pelvis  Without Contrast  Narrative: EXAMINATION:  CT ABDOMEN PELVIS WITHOUT CONTRAST    CLINICAL HISTORY:  UTI, recurrent/complicated (Female);    TECHNIQUE:  Axial images of the abdomen and pelvis were acquired without the use of IV contrast.  Coronal and sagittal reconstructions were also obtained    COMPARISON:  IR nephrostomy tube change 05/02/2022    CT abdomen pelvis 06/17/2021    FINDINGS:  Heart: Normal in size. No pericardial effusion.    Lungs: Visualized portions of the lungs are clear without consolidation.  Few scattered bands of subsegmental atelectasis.  4 mm right middle lobe nodule series 2, image 4 similar to December 2020.  Additional 4 mm right pleural base nodule slightly more conspicuous compared to prior.    Liver: Normal in size and contour.  No focal hepatic lesion.    Gallbladder: Surgically absent.    Bile Ducts: No evidence of dilated ducts.    Pancreas: No mass or peripancreatic fat stranding.    Spleen: Unremarkable.    Stomach and duodenum: Unremarkable.    Adrenals: Unremarkable.    Kidneys/Ureters: Postoperative change of colon conduit urinary diversion with left lower quadrant urostomy.  Bilateral nephrostomy tubes in  place.  Kidneys normal in size and location.  Right renal stones measuring 4 mm and 7 mm.  No right hydronephrosis.  New 4 mm stone within the proximal right ureter (axial series 2, image 96).  Mild prominence/postsurgical change of the right ureter, similar to prior.  Moderate left hydronephrosis and wall thickening of the left collecting system/ureter, similar to prior.  Punctate focus of air in the left collecting system (axial series 2, image 73).  No left nephrolithiasis or ureteral stones.    Bladder: Not distended.    Reproductive organs: Uterus surgically absent.    Bowel/Mesentery: Small bowel is normal in caliber with no evidence of obstruction. No evidence of inflammation or wall thickening.  Postoperative change of right hemicolectomy with ileostomy in the right lower quadrant.  Long Charlotte's pouch relatively decompressed with small amount of high density material at its proximal end.  Colon demonstrates no focal wall thickening.    Peritoneum: No intraperitoneal free air or fluid.    Lymph nodes: No retroperitoneal lymphadenopathy.  Scattered para-aortic nodes noted similar.    Vasculature: No aneurysm. No significant calcific atherosclerosis.    Abdominal wall:  No parastomal hernias.  No fluid collections along the course of the nephrostomy tubes.    Bones: No acute fracture. No suspicious osseous lesions.  Impression: 1. Postoperative change of colon conduit urinary diversion with left lower quadrant urostomy and bilateral nephrostomy tubes in place.  2. Right nephrolithiasis and new 4 mm stone within the proximal right ureter.  No right hydronephrosis.  Prominent/postsurgical change of the right ureter, similar to prior.  3. Persistent moderate left hydronephrosis and punctate focus of air as above.  4. Additional findings as above.  5. Right pulmonary micro nodules.  The 4 mm pleural base nodule is more conspicuous.  Attention on follow-up.    Electronically signed by resident: Bro  Kosta  Date:    08/24/2022  Time:    10:16    Electronically signed by: John Mejia MD  Date:    08/24/2022  Time:    11:30      All imaging within the last 24 hours was reviewed.       Discharge Planning   OK: 8/30/2022     Code Status: Full Code   Is the patient medically ready for discharge?: No    Reason for patient still in hospital (select all that apply): Patient trending condition, Treatment, and Consult recommendations         High Risk Conditions:  Patient is currently receiving parenteral controlled substances: Morphine          Assessment/Plan:      * Pyelonephritis  - recently presented with pyelonephritis of the left kidney and was being treated with oral cefdinir, but presents this evening with right-sided flank pain with UA with >100 WBCs concerning for infection with known nephrolithiasis of the right kidney  - Urine culture from recent ED visit growing >100,000 CFUs of pan-sensitive Klebsiella pneumoniae  - start IV ceftriaxone 1 g q24h given sensitivities of Klebsiella pneumoniae but will f/u urine culture  - IV morphine PRN pain  - IV ondansetron PRN nausea/vomiting  - Urology following patient, recommend bilateral nephrostomy exchange with IR - IR completed on 8/29  - ID has follow patient in the past; consulted for recommendations for antibiotic choice and length therapy given complicated UTI  - patient will need to f/u with urology as outpatient for stone management    Nephrostomy status  Last exchanged this admit on 8/29 by IR      Metabolic acidosis  - patient presenting with a non-anion gap metabolic acidosis with bicarbonate of 12 likely secondary to acute on chronic kidney injury  - will continue to monitor, addressing acute kidney injury as noted  - continue na bicarb    Presence of urostomy  Urine present to ostomy despite nephrostomies in place  -nursing care prn      Ileostomy in place  Emptying and bag change prn with nursing assistance      Hydronephrosis        ODESSA (acute  kidney injury)  Estimated Creatinine Clearance: 51.3 mL/min (based on SCr of 1.4 mg/dL).  Patient with acute kidney injury likely due to post-obstructive d/t obstruction secondary to cervical cancer and fibrosis ODESSA is currently worsening. Labs reviewed- Renal function/electrolytes with Estimated Creatinine Clearance: 51.3 mL/min (based on SCr of 1.4 mg/dL).   - gave 1 liter of lactated ringer's solution  - Monitor urine output and serial BMP and adjust therapy as needed  - Avoid nephrotoxins and renally dose meds for GFR listed above      Cervical cancer  - patient has a history of cervical cancer and is s/p surgical resection with pelvic radiation with a history of an open transverse colon conduit and urinary diversion complicated by bowel perforation and subsequently received hemicolectomy and ileostomy creation  - Wound Care consult placed for ostomy care          VTE Risk Mitigation (From admission, onward)         Ordered     IP VTE HIGH RISK PATIENT  Once         08/26/22 2104     Place sequential compression device  Until discontinued         08/26/22 2104     IP VTE HIGH RISK PATIENT  Once         08/26/22 2104     Place sequential compression device  Until discontinued         08/26/22 2104                I have assessed these findings virtually using a telemed platform and with assistance of the bedside nurse.      The attending portion of this evaluation, treatment, and documentation was performed per Kiki Culp MD via Telemedicine AudioVisual using the secure [employer] software platform with 2 way audio/video. The provider was located off-site and the patient is located in the hospital. The aforementioned video software was utilized to document the relevant history and physical exam    Kiki Culp MD  Department of [facility] Medicine   [facility] - Surgery

## 2022-08-29 NOTE — SUBJECTIVE & OBJECTIVE
This encounter was provided through telemedicine.  Patient was transferred to the telemedicine service on:  08/29/2022   The patient location is: 505/505 A admitted 8/26/2022  6:31 PM.  Present with the patient at the time of the telemed/virtual assessment: Telepresenter    Interval History/Overnight Events:   Clinical record since admit reviewed.  Uneventful night; back pain controlled with morphine IV; urine concentrated on right nephrostomy; awaiting nephrostomy tube exchange    Poor appetite - will start IVF while NPO - no dysphagia or odynophagia    Review of Systems   Constitutional:  Positive for activity change, appetite change and fatigue. Negative for fever.   Respiratory:  Negative for cough and shortness of breath.    Gastrointestinal:  Negative for diarrhea and vomiting.   Genitourinary:  Positive for flank pain.      Inpatient Medications:  Scheduled Meds:   cefTRIAXone (ROCEPHIN) IVPB  1 g Intravenous Q24H    gabapentin  100 mg Oral QHS    sodium bicarbonate  650 mg Oral BID     Continuous Infusions:  PRN Meds:.acetaminophen, glucose, melatonin, morphine, ondansetron, sodium chloride 0.9%, sodium chloride 0.9%      Objective:     Temp:  [97.3 °F (36.3 °C)-98.4 °F (36.9 °C)] 97.3 °F (36.3 °C)  Pulse:  [80-86] 84  Resp:  [16-20] 20  SpO2:  [95 %-100 %] 99 %  BP: ()/(55-79) 107/67      Intake/Output Summary (Last 24 hours) at 8/29/2022 0945  Last data filed at 8/29/2022 0900  Gross per 24 hour   Intake 650 ml   Output 950 ml   Net -300 ml        Body mass index is 28.8 kg/m².    Physical Exam  Vitals and nursing note reviewed.   Constitutional:       General: She is not in acute distress.     Appearance: She is ill-appearing.   HENT:      Head: Normocephalic and atraumatic.   Eyes:      General: No scleral icterus.        Right eye: No discharge.         Left eye: No discharge.      Extraocular Movements: Extraocular movements intact.   Cardiovascular:      Rate and Rhythm: Normal rate.    Pulmonary:      Effort: Pulmonary effort is normal. No tachypnea or respiratory distress.   Neurological:      General: No focal deficit present.      Mental Status: She is alert and oriented to person, place, and time.      Cranial Nerves: No cranial nerve deficit.      Motor: No weakness.   Psychiatric:         Mood and Affect: Mood and affect normal.         Speech: Speech normal.         Behavior: Behavior is cooperative.         Thought Content: Thought content normal.        Labs:  Recent Results (from the past 24 hour(s))   Comprehensive Metabolic Panel (CMP)    Collection Time: 08/29/22  5:59 AM   Result Value Ref Range    Sodium 136 136 - 145 mmol/L    Potassium 4.1 3.5 - 5.1 mmol/L    Chloride 113 (H) 95 - 110 mmol/L    CO2 16 (L) 23 - 29 mmol/L    Glucose 96 70 - 110 mg/dL    BUN 21 (H) 6 - 20 mg/dL    Creatinine 1.4 0.5 - 1.4 mg/dL    Calcium 9.7 8.7 - 10.5 mg/dL    Total Protein 7.5 6.0 - 8.4 g/dL    Albumin 2.8 (L) 3.5 - 5.2 g/dL    Total Bilirubin 0.2 0.1 - 1.0 mg/dL    Alkaline Phosphatase 156 (H) 55 - 135 U/L    AST 17 10 - 40 U/L    ALT 15 10 - 44 U/L    Anion Gap 7 (L) 8 - 16 mmol/L    eGFR 43.3 (A) >60 mL/min/1.73 m^2   Magnesium    Collection Time: 08/29/22  5:59 AM   Result Value Ref Range    Magnesium 2.0 1.6 - 2.6 mg/dL   CBC Without Differential    Collection Time: 08/29/22  5:59 AM   Result Value Ref Range    WBC 7.84 3.90 - 12.70 K/uL    RBC 4.24 4.00 - 5.40 M/uL    Hemoglobin 11.8 (L) 12.0 - 16.0 g/dL    Hematocrit 37.8 37.0 - 48.5 %    MCV 89 82 - 98 fL    MCH 27.8 27.0 - 31.0 pg    MCHC 31.2 (L) 32.0 - 36.0 g/dL    RDW 14.1 11.5 - 14.5 %    Platelets 279 150 - 450 K/uL    MPV 10.4 9.2 - 12.9 fL   Protime-INR    Collection Time: 08/29/22  8:54 AM   Result Value Ref Range    Prothrombin Time 9.9 9.0 - 12.5 sec    INR 0.9 0.8 - 1.2        Lab Results   Component Value Date    [id number] Negative 08/26/2022       Recent Labs   Lab 08/24/22  0745 08/26/22  1810 08/26/22  2019  08/27/22 0254 08/28/22 0411 08/29/22  0559   WBC 13.33* 9.05  --  8.22 7.12 7.84   LYMPH 10.0*  1.3 18.5  1.7  --   --   --   --    HGB 12.7 12.5  --  12.5 12.0 11.8*   HCT 42.0 39.5   < > 39.7 39.0 37.8    203  --  171 245 279    < > = values in this interval not displayed.     Recent Labs   Lab 08/27/22 0254 08/28/22 0411 08/29/22  0559   * 139 136   K 3.7 3.9 4.1    111* 113*   CO2 17* 18* 16*   BUN 21* 23* 21*   CREATININE 1.6* 1.6* 1.4   GLU 87 105 96   CALCIUM 9.9 9.7 9.7   MG 2.0 2.0 2.0     Recent Labs   Lab 08/27/22 0254 08/28/22 0411 08/29/22  0559 08/29/22  0854   ALKPHOS 141* 139* 156*  --    ALT 16 14 15  --    AST 17 13 17  --    ALBUMIN 3.1* 2.9* 2.8*  --    PROT 7.9 7.6 7.5  --    BILITOT 0.3 0.2 0.2  --    INR  --   --   --  0.9        No results for input(s): DDIMER, FERRITIN, CRP, LDH, BNP, TROPONINI, CPK in the last 72 hours.    Invalid input(s): PROCALCITONIN    All labs within the last 24 hours were reviewed.     Microbiology:  Microbiology Results (last 7 days)       Procedure Component Value Units Date/Time    Urine culture [[id number]] Collected: 08/26/22 1943    Order Status: Completed Specimen: Urine Updated: 08/28/22 0002     Urine Culture, Routine Multiple organisms isolated. None in predominance.  Repeat if      clinically necessary.    Narrative:      Specimen Source->Urine              Imaging  ECG Results              EKG 12-lead (Final result)  Result time 08/27/22 14:23:35      Final result by Interface, Lab In [facility] (08/27/22 14:23:35)                   Narrative:    Test Reason : E87.5,    Vent. Rate : 082 BPM     Atrial Rate : 082 BPM     P-R Int : 132 ms          QRS Dur : 086 ms      QT Int : 372 ms       P-R-T Axes : 080 091 085 degrees     QTc Int : 434 ms    Normal sinus rhythm  Rightward axis  Borderline Abnormal ECG  When compared with ECG of 13-SEP-2021 16:03,  No significant change was found  Confirmed by CHINO BACK MD ([id number]) on  8/27/2022 2:23:26 PM    Referred By: [email or web address]   SELF           Confirmed By:CHINO BACK MD                                    Results for orders placed during the hospital encounter of 04/16/21    Echo Color Flow Doppler? Yes    Interpretation Summary  · The left ventricle is normal in size with normal systolic function.  · The estimated ejection fraction is 65%.  · Normal left ventricular diastolic function.  · Normal right ventricular size with normal right ventricular systolic function.  · Normal central venous pressure (3 mmHg).      CT Abdomen Pelvis  Without Contrast  Narrative: EXAMINATION:  CT ABDOMEN PELVIS WITHOUT CONTRAST    CLINICAL HISTORY:  UTI, recurrent/complicated (Female);    TECHNIQUE:  Axial images of the abdomen and pelvis were acquired without the use of IV contrast.  Coronal and sagittal reconstructions were also obtained    COMPARISON:  IR nephrostomy tube change 05/02/2022    CT abdomen pelvis 06/17/2021    FINDINGS:  Heart: Normal in size. No pericardial effusion.    Lungs: Visualized portions of the lungs are clear without consolidation.  Few scattered bands of subsegmental atelectasis.  4 mm right middle lobe nodule series 2, image 4 similar to December 2020.  Additional 4 mm right pleural base nodule slightly more conspicuous compared to prior.    Liver: Normal in size and contour.  No focal hepatic lesion.    Gallbladder: Surgically absent.    Bile Ducts: No evidence of dilated ducts.    Pancreas: No mass or peripancreatic fat stranding.    Spleen: Unremarkable.    Stomach and duodenum: Unremarkable.    Adrenals: Unremarkable.    Kidneys/Ureters: Postoperative change of colon conduit urinary diversion with left lower quadrant urostomy.  Bilateral nephrostomy tubes in place.  Kidneys normal in size and location.  Right renal stones measuring 4 mm and 7 mm.  No right hydronephrosis.  New 4 mm stone within the proximal right ureter (axial series 2, image 96).  Mild  prominence/postsurgical change of the right ureter, similar to prior.  Moderate left hydronephrosis and wall thickening of the left collecting system/ureter, similar to prior.  Punctate focus of air in the left collecting system (axial series 2, image 73).  No left nephrolithiasis or ureteral stones.    Bladder: Not distended.    Reproductive organs: Uterus surgically absent.    Bowel/Mesentery: Small bowel is normal in caliber with no evidence of obstruction. No evidence of inflammation or wall thickening.  Postoperative change of right hemicolectomy with ileostomy in the right lower quadrant.  Long Charlotte's pouch relatively decompressed with small amount of high density material at its proximal end.  Colon demonstrates no focal wall thickening.    Peritoneum: No intraperitoneal free air or fluid.    Lymph nodes: No retroperitoneal lymphadenopathy.  Scattered para-aortic nodes noted similar.    Vasculature: No aneurysm. No significant calcific atherosclerosis.    Abdominal wall:  No parastomal hernias.  No fluid collections along the course of the nephrostomy tubes.    Bones: No acute fracture. No suspicious osseous lesions.  Impression: 1. Postoperative change of colon conduit urinary diversion with left lower quadrant urostomy and bilateral nephrostomy tubes in place.  2. Right nephrolithiasis and new 4 mm stone within the proximal right ureter.  No right hydronephrosis.  Prominent/postsurgical change of the right ureter, similar to prior.  3. Persistent moderate left hydronephrosis and punctate focus of air as above.  4. Additional findings as above.  5. Right pulmonary micro nodules.  The 4 mm pleural base nodule is more conspicuous.  Attention on follow-up.    Electronically signed by resident: Bro Brambila  Date:    08/24/2022  Time:    10:16    Electronically signed by: John Mejia MD  Date:    08/24/2022  Time:    11:30      All imaging within the last 24 hours was reviewed.       Discharge Planning    OK: 8/30/2022     Code Status: Full Code   Is the patient medically ready for discharge?: No    Reason for patient still in hospital (select all that apply): Patient trending condition, Treatment, and Consult recommendations         High Risk Conditions:  Patient is currently receiving parenteral controlled substances: Morphine

## 2022-08-29 NOTE — H&P
Inpatient Radiology Pre-procedure Note    History of Present Illness:  Edita Riley is a 57 y.o. female with cervical cancer s/p pelvic XRT c/b B ureteral strictures. B PCN last exchanged 5/2/2022.    Admission H&P reviewed.    Past Medical History:   Diagnosis Date    Abnormal mammogram 8/25/2020    Acute deep vein thrombosis (DVT) of lower extremity 12/9/2020    Anxiety     Cervical cancer 2014    Chronic back pain     Depression     Diarrhea due to malabsorption 11/14/2018    DVT of lower extremity, bilateral 11/4/2020    Fibromyalgia     Fungemia 9/27/2020    Generalized abdominal pain 8/25/2020    GERD (gastroesophageal reflux disease)     Hemifacial spasm 9/16/2015    Hiatal hernia 2014    History of cervical cancer 10/11/2018    Hx of psychiatric care     Cymbalta, trazodone    Hypertension     Hypomagnesemia 11/21/2018    Lactose intolerance     Metastatic squamous cell carcinoma to lymph node 10/11/2018    Nephrostomy tube displaced     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 drug 11/14/2018    Osteoarthritis of back     Peritonitis 9/22/2020    Pseudomonas urinary tract infection 4/21/2021    Psychiatric problem     Schatzki's ring 9/14/2015    Seen on outside EGD 05/2014, underwent esophageal dilatation. Bx were negative.     Stroke 1972    Urinary tract infection associated with nephrostomy catheter 6/11/2020    Wound infection after surgery 9/24/2020     Past Surgical History:   Procedure Laterality Date    ANTEGRADE NEPHROSTOGRAPHY Bilateral 9/28/2020    Procedure: Nephrostogram - antegrade;  Surgeon: Leslie Balbuena MD;  Location: [facility] OR [address] [place] [address];  Service: Urology;  Laterality: Bilateral;    ANTEGRADE NEPHROSTOGRAPHY Left 4/20/2021    Procedure: NEPHROSTOGRAM, ANTEGRADE;  Surgeon: Leslie Balbuena MD;  Location: [facility] OR [address] [place] [address];  Service: Urology;  Laterality: Left;    BILATERAL OOPHORECTOMY  2015    CHOLECYSTECTOMY  11/09/2016    Done at [facility], path showed chronic cholecystitis and  Lab Results   Component Value Date    HGBA1C 5 7 (H) 12/14/2021       Recent Labs     01/23/22  1041 01/23/22  1615 01/23/22  2203 01/24/22  0700   POCGLU 242* 190* 251* 158*       Blood Sugar Average: Last 72 hrs:  (P) 279 [id number]     · Follows up with [facility] for 20 years-with skin graft for diabetic foot ulcer  · Wound care consult gallstones    cold knife conization  2014    COLECTOMY, RIGHT  9/17/2020    Procedure: COLECTOMY, RIGHT Extended;  Surgeon: Hammad Reynolds MD;  Location: [facility] OR 2ND FLR;  Service: Colon and Rectal;;    COLONOSCOPY  2014    COLONOSCOPY N/A 10/24/2016    at [facility], Dr Gutiérrez    COLONOSCOPY N/A 5/18/2018    Procedure: COLONOSCOPY;  Surgeon: Arden Gutiérrez MD;  Location: [facility] ENDO (4TH FLR);  Service: Endoscopy;  Laterality: N/A;    COLONOSCOPY N/A 7/28/2020    Procedure: COLONOSCOPY;  Surgeon: Hammad Reynolds MD;  Location: [facility] ENDO (4TH FLR);  Service: Colon and Rectal;  Laterality: N/A;  covid test [place] 7/25    CYSTOSCOPY WITH URETEROSCOPY, RETROGRADE PYELOGRAPHY, AND INSERTION OF STENT Bilateral 3/21/2020    Procedure: CYSTOSCOPY, WITH RETROGRADE PYELOGRAM,;  Surgeon: Leslie Balbuena MD;  Location: [facility] OR 1ST FLR;  Service: Urology;  Laterality: Bilateral;    ESOPHAGOGASTRODUODENOSCOPY  2014    ESOPHAGOGASTRODUODENOSCOPY  11/18/2020    ESOPHAGOGASTRODUODENOSCOPY N/A 11/18/2020    Procedure: ESOPHAGOGASTRODUODENOSCOPY (EGD);  Surgeon: Zenon Spencer MD;  Location: [facility];  Service: Endoscopy;  Laterality: N/A;    ESOPHAGOGASTRODUODENOSCOPY N/A 12/11/2020    Procedure: EGD (ESOPHAGOGASTRODUODENOSCOPY);  Surgeon: Juancho Muse MD;  Location: [facility] (2ND FLR);  Service: Endoscopy;  Laterality: N/A;    HYSTERECTOMY  2014    [facility] for cervical cancer    ILEOSTOMY  9/17/2020    Procedure: CREATION, ILEOSTOMY;  Surgeon: Hammad Reynolds MD;  Location: [facility] OR 2ND FLR;  Service: Colon and Rectal;;    LOOPOGRAM N/A 4/20/2021    Procedure: LOOPOGRAM;  Surgeon: Leslie Balbuena MD;  Location: [facility] OR 1ST FLR;  Service: Urology;  Laterality: N/A;    MOBILIZATION OF SPLENIC FLEXURE N/A 9/11/2020    Procedure: MOBILIZATION, COLONIC;  Surgeon: Hammad Reynolds MD;  Location: [facility] OR 2ND FLR;  Service: Colon and Rectal;  Laterality: N/A;    NEPHROSTOGRAPHY Bilateral 4/17/2021    Procedure: Nephrostogram;   Surgeon: Celeste Surgeon;  Location: [facility];  Service: Anesthesiology;  Laterality: Bilateral;    OOPHORECTOMY      RETROPERITONEAL LYMPHADENECTOMY Right 9/17/2018    Procedure: LYMPHADENECTOMY, RETROPERITONEUM;  Surgeon: Sebas Reed MD;  Location[facility] OR [address] [place] [address];  Service: General;  Laterality: Right;  ILIAC       Review of Systems:   As documented in primary team H&P    Home Meds:   Prior to Admission medications    Medication Sig Start Date End Date Taking? Authorizing Provider   cefdinir (OMNICEF) 300 MG capsule Take 1 capsule (300 mg total) by mouth 2 (two) times daily. for 10 days 8/24/22 9/3/22  Stephanie Gill PA-C   ergocalciferol (ERGOCALCIFEROL) 50,000 unit Cap Take 1 capsule (50,000 Units total) by mouth every 7 days. 9/6/21 9/6/22  Kacey Bergeron MD   gabapentin (NEURONTIN) 100 MG capsule Take 1 capsule (100 mg total) by mouth every evening. 2/15/22   Diony Ram MD   melatonin (MELATIN) 3 mg tablet Take 2 tablets (6 mg total) by mouth nightly as needed for Insomnia. 9/6/21   Kacey Bergeron MD   mirtazapine (REMERON SOL-TAB) 15 MG disintegrating tablet Dissolve 1 tablet (15 mg total) by mouth nightly. 2/15/22 2/15/23  Diony Ram MD   pantoprazole (PROTONIX) 40 MG tablet Take 1 tablet (40 mg total) by mouth once daily. 9/6/21 9/6/22  Kacey Bergeron MD   sodium bicarbonate 650 MG tablet Take 2 tablets (1,300 mg total) by mouth 2 (two) times daily. 2/15/22 2/15/23  Diony Ram MD     Scheduled Meds:    cefTRIAXone (ROCEPHIN) IVPB  1 g Intravenous Q24H    gabapentin  100 mg Oral QHS    sodium bicarbonate  650 mg Oral BID     Continuous Infusions:   PRN Meds:acetaminophen, glucose, melatonin, morphine, ondansetron, sodium chloride 0.9%, sodium chloride 0.9%  Anticoagulants/Antiplatelets: no anticoagulation    Allergies:   Review of patient's allergies indicates:   Allergen Reactions    Bee sting [allergen ext-venom-honey bee]      Rash      Grass pollen-bermuda,  standard      rash     Sedation Hx: have not been any systemic reactions    Labs:  Recent Labs   Lab 08/29/22  0854   INR 0.9       Recent Labs   Lab 08/29/22  0559   WBC 7.84   HGB 11.8*   HCT 37.8   MCV 89         Recent Labs   Lab 08/29/22  0559   GLU 96      K 4.1   *   CO2 16*   BUN 21*   CREATININE 1.4   CALCIUM 9.7   MG 2.0   ALT 15   AST 17   ALBUMIN 2.8*   BILITOT 0.2         Vitals:  Temp: 97.3 °F (36.3 °C) (08/29/22 0738)  Pulse: 84 (08/29/22 0738)  Resp: 20 (08/29/22 0738)  BP: 107/67 (08/29/22 0738)  SpO2: 99 % (08/29/22 0738)     Physical Exam:  ASA: III  Mallampati: II    General: no acute distress  Mental Status: alert and oriented to person, place and time  HEENT: normocephalic, atraumatic  Chest: unlabored breathing  Heart: regular heart rate  Abdomen: nondistended  Extremity: moves all extremities      Plan:  Sedation Plan: up to moderate.  Patient will undergo bilateral nephrostomy tube exchange.          Yong Rick MD  Radiology Resident PGY- 2  [facility]

## 2022-08-29 NOTE — BRIEF OP NOTE
Radiology Post-Procedure Note    Pre Op Diagnosis: ureteral obstruction    Post Op Diagnosis: same    Procedure: bilateral PCN placement    Procedure performed by: Susan Cueva MD    Written Informed Consent Obtained: Yes    Specimen Removed: NO    Estimated Blood Loss: Minimal    Findings:   Bilateral 12 FR PCN exchange.     Patient tolerated procedure well.    Tubes were severely encrusted.     Keep to bag drainage, if there is concern about patency or malposition contact IR.    Recommend check and exchange in 2 months.         Susan Cueva MD  Interventional Radiology

## 2022-08-29 NOTE — ASSESSMENT & PLAN NOTE
- recently presented with pyelonephritis of the left kidney and was being treated with oral cefdinir, but presents this evening with right-sided flank pain with UA with >100 WBCs concerning for infection with known nephrolithiasis of the right kidney  - Urine culture from recent ED visit growing >100,000 CFUs of pan-sensitive Klebsiella pneumoniae  - start IV ceftriaxone 1 g q24h given sensitivities of Klebsiella pneumoniae but will f/u urine culture  - IV morphine PRN pain  - IV ondansetron PRN nausea/vomiting  - Urology following patient, recommend bilateral nephrostomy exchange with IR - IR completed on 8/29  - ID has follow patient in the past; consulted for recommendations for antibiotic choice and length therapy given complicated UTI  - patient will need to f/u with urology as outpatient for stone management

## 2022-08-30 LAB
ALBUMIN SERPL BCP-MCNC: 2.7 G/DL (ref 3.5–5.2)
ALP SERPL-CCNC: 138 U/L (ref 55–135)
ALT SERPL W/O P-5'-P-CCNC: 15 U/L (ref 10–44)
ANION GAP SERPL CALC-SCNC: 10 MMOL/L (ref 8–16)
AST SERPL-CCNC: 13 U/L (ref 10–40)
BILIRUB SERPL-MCNC: 0.2 MG/DL (ref 0.1–1)
BUN SERPL-MCNC: 18 MG/DL (ref 6–20)
CALCIUM SERPL-MCNC: 9.6 MG/DL (ref 8.7–10.5)
CHLORIDE SERPL-SCNC: 112 MMOL/L (ref 95–110)
CO2 SERPL-SCNC: 17 MMOL/L (ref 23–29)
CREAT SERPL-MCNC: 1.3 MG/DL (ref 0.5–1.4)
EST. GFR  (NO RACE VARIABLE): 47.4 ML/MIN/1.73 M^2
GLUCOSE SERPL-MCNC: 100 MG/DL (ref 70–110)
MAGNESIUM SERPL-MCNC: 1.9 MG/DL (ref 1.6–2.6)
PHOSPHATE SERPL-MCNC: 3.6 MG/DL (ref 2.7–4.5)
POTASSIUM SERPL-SCNC: 4.3 MMOL/L (ref 3.5–5.1)
PROT SERPL-MCNC: 7.2 G/DL (ref 6–8.4)
SODIUM SERPL-SCNC: 139 MMOL/L (ref 136–145)

## 2022-08-30 PROCEDURE — 25000003 PHARM REV CODE 250: Performed by: STUDENT IN AN ORGANIZED HEALTH CARE EDUCATION/TRAINING PROGRAM

## 2022-08-30 PROCEDURE — 63600175 PHARM REV CODE 636 W HCPCS: Performed by: STUDENT IN AN ORGANIZED HEALTH CARE EDUCATION/TRAINING PROGRAM

## 2022-08-30 PROCEDURE — 84100 ASSAY OF PHOSPHORUS: CPT | Performed by: INTERNAL MEDICINE

## 2022-08-30 PROCEDURE — 99233 SBSQ HOSP IP/OBS HIGH 50: CPT | Mod: 95,,, | Performed by: INTERNAL MEDICINE

## 2022-08-30 PROCEDURE — 99223 1ST HOSP IP/OBS HIGH 75: CPT | Mod: ,,, | Performed by: PHYSICIAN ASSISTANT

## 2022-08-30 PROCEDURE — 80053 COMPREHEN METABOLIC PANEL: CPT | Performed by: INTERNAL MEDICINE

## 2022-08-30 PROCEDURE — 63600175 PHARM REV CODE 636 W HCPCS: Performed by: INTERNAL MEDICINE

## 2022-08-30 PROCEDURE — S5010 5% DEXTROSE AND 0.45% SALINE: HCPCS | Performed by: INTERNAL MEDICINE

## 2022-08-30 PROCEDURE — 11000001 HC ACUTE MED/SURG PRIVATE ROOM

## 2022-08-30 PROCEDURE — 36415 COLL VENOUS BLD VENIPUNCTURE: CPT | Performed by: INTERNAL MEDICINE

## 2022-08-30 PROCEDURE — 25000003 PHARM REV CODE 250: Performed by: INTERNAL MEDICINE

## 2022-08-30 PROCEDURE — 99233 PR SUBSEQUENT HOSPITAL CARE,LEVL III: ICD-10-PCS | Mod: 95,,, | Performed by: INTERNAL MEDICINE

## 2022-08-30 PROCEDURE — 99223 PR INITIAL HOSPITAL CARE,LEVL III: ICD-10-PCS | Mod: ,,, | Performed by: PHYSICIAN ASSISTANT

## 2022-08-30 PROCEDURE — 83735 ASSAY OF MAGNESIUM: CPT | Performed by: INTERNAL MEDICINE

## 2022-08-30 RX ORDER — DEXTROSE MONOHYDRATE AND SODIUM CHLORIDE 5; .45 G/100ML; G/100ML
INJECTION, SOLUTION INTRAVENOUS CONTINUOUS
Status: ACTIVE | OUTPATIENT
Start: 2022-08-30 | End: 2022-08-31

## 2022-08-30 RX ORDER — OXYCODONE HYDROCHLORIDE 5 MG/1
5 TABLET ORAL EVERY 6 HOURS PRN
Status: DISCONTINUED | OUTPATIENT
Start: 2022-08-30 | End: 2022-09-01 | Stop reason: HOSPADM

## 2022-08-30 RX ORDER — MORPHINE SULFATE 4 MG/ML
3 INJECTION, SOLUTION INTRAMUSCULAR; INTRAVENOUS EVERY 4 HOURS PRN
Status: DISCONTINUED | OUTPATIENT
Start: 2022-08-30 | End: 2022-08-31

## 2022-08-30 RX ORDER — GABAPENTIN 100 MG/1
200 CAPSULE ORAL NIGHTLY
Status: DISCONTINUED | OUTPATIENT
Start: 2022-08-30 | End: 2022-09-01 | Stop reason: HOSPADM

## 2022-08-30 RX ORDER — SUCRALFATE 1 G/1
1 TABLET ORAL
Status: DISCONTINUED | OUTPATIENT
Start: 2022-08-30 | End: 2022-09-01 | Stop reason: HOSPADM

## 2022-08-30 RX ORDER — ACETAMINOPHEN 325 MG/1
650 TABLET ORAL EVERY 6 HOURS PRN
Status: DISCONTINUED | OUTPATIENT
Start: 2022-08-30 | End: 2022-09-01 | Stop reason: HOSPADM

## 2022-08-30 RX ADMIN — GABAPENTIN 200 MG: 100 CAPSULE ORAL at 09:08

## 2022-08-30 RX ADMIN — MORPHINE SULFATE 4 MG: 4 INJECTION INTRAVENOUS at 12:08

## 2022-08-30 RX ADMIN — SODIUM BICARBONATE 650 MG TABLET 650 MG: at 09:08

## 2022-08-30 RX ADMIN — MORPHINE SULFATE 4 MG: 4 INJECTION INTRAVENOUS at 07:08

## 2022-08-30 RX ADMIN — DEXTROSE AND SODIUM CHLORIDE: 5; .45 INJECTION, SOLUTION INTRAVENOUS at 07:08

## 2022-08-30 RX ADMIN — SODIUM BICARBONATE 650 MG TABLET 650 MG: at 08:08

## 2022-08-30 RX ADMIN — CEFTRIAXONE 1 G: 1 INJECTION, SOLUTION INTRAVENOUS at 10:08

## 2022-08-30 RX ADMIN — SUCRALFATE 1 G: 1 TABLET ORAL at 05:08

## 2022-08-30 NOTE — PLAN OF CARE
[facility]  Initial Discharge Assessment       Primary Care Provider: No primary care provider on file.    Admission Diagnosis: Hyperkalemia [E87.5]  Metabolic acidosis, normal anion gap (NAG) [E87.2]  Chest pain [R07.9]  Acute right flank pain [R10.9]    Admission Date: 8/26/2022  Expected Discharge Date: 9/1/2022         Payor: MEDICAID / Plan: [facility] ([facility]) / Product Type: Managed Medicaid /     Extended Emergency Contact Information  Primary Emergency Contact: Hammad Riley  Address: [address] [place] [address]  Home Phone: [phone]  Relation: Spouse  Secondary Emergency Contact: Spisrael Lucillesofi  Mobile Phone: [phone]  Relation: Daughter    Discharge Plan A: Home with family  Discharge Plan B: Home with family      [id number] - [place], [place] - [address] [facility].  [address] [facility]  [place] [address]  Phone: [phone] Fax: [phone]    [id number] - [place] - [address]  [address]  [place] [address]  Phone: [phone] Fax: [phone]    [facility]  [address] [facility] [address]  Phone: [phone] Fax: [phone]      Initial Assessment (most recent)       Adult Discharge Assessment - 08/30/22 0841          Discharge Assessment    Assessment Type Discharge Planning Assessment     Confirmed/corrected address, phone number and insurance Yes     Confirmed Demographics Correct on Facesheet     Source of Information patient     Does patient/caregiver understand observation status Yes     Communicated OK with patient/caregiver Yes     Lives With spouse;child(abigail), adult     Do you expect to return to your current living situation? Yes     Do you have help at home or someone to help you manage your care at home? Yes     Who are your caregiver(s) and their phone number(s)? Hammad Cantu (Spouse) [phone]     Prior  to hospitilization cognitive status: Alert/Oriented     Current cognitive status: Alert/Oriented     Walking or Climbing Stairs Difficulty ambulation difficulty, requires equipment     Mobility Management rolling walker, wheelchair     Dressing/Bathing Difficulty none     Home Layout Able to live on 1st floor     Equipment Currently Used at Home wheelchair;walker, rolling     Readmission within 30 days? No     Patient currently being followed by outpatient case management? No     Do you currently have service(s) that help you manage your care at home? No     Do you take prescription medications? Yes     Do you have prescription coverage? Yes     Do you have any problems affording any of your prescribed medications? No     Is the patient taking medications as prescribed? yes     Who is going to help you get home at discharge? Spouse and daughter     How do you get to doctors appointments? family or friend will provide     Are you on dialysis? No     Do you take coumadin? No     Discharge Plan A Home with family     Discharge Plan B Home with family                      Spoke with patient at bedside to complete d/c planning assessment. Patient lives with her spouse and adult daughter in a single story home with no steps to enter. Independent with ADL'S. Patient has a wheelchair and Rolling walker in home. Verified PCP, Pharmacy and health insurance. Patient will have help at home from her spouse and daughter. ID following for antibiotic needs. [facility] following as well. Will continue to monitor.

## 2022-08-30 NOTE — SUBJECTIVE & OBJECTIVE
This encounter was provided through telemedicine.  Patient was transferred to the telemedicine service on:  08/29/2022   The patient location is: 505/505 A admitted 8/26/2022  6:31 PM.  Present with the patient at the time of the telemed/virtual assessment: Telepresenter    Interval History/Overnight Events:     Right sided flank pain improved today; patient with nausea after procedure but now resolved; she does have abd pain at home with nausea and reflux symptoms - ID to evaluate and make antibiotic recs; trial of oral pain med; she also suffers with pain to her legs and gabapentin dose has not been increased in the recent past; urine is clear in ostomy bags today    Review of Systems   Constitutional:  Positive for activity change, appetite change and fatigue. Negative for fever.   Respiratory:  Negative for cough and shortness of breath.    Gastrointestinal:  Negative for diarrhea and vomiting.   Genitourinary:  Positive for flank pain.      Inpatient Medications:  Scheduled Meds:   cefTRIAXone (ROCEPHIN) IVPB  1 g Intravenous Q24H    gabapentin  100 mg Oral QHS    sodium bicarbonate  650 mg Oral BID     Continuous Infusions:  PRN Meds:.acetaminophen, cefTRIAXone (ROCEPHIN) IVPB, fentaNYL, fentaNYL, glucose, melatonin, midazolam, morphine, ondansetron, sodium chloride 0.9%, sodium chloride 0.9%      Objective:     Temp:  [97.6 °F (36.4 °C)-98.6 °F (37 °C)] 97.7 °F (36.5 °C)  Pulse:  [71-90] 82  Resp:  [14-23] 20  SpO2:  [96 %-100 %] 100 %  BP: ()/(52-69) 116/60      Intake/Output Summary (Last 24 hours) at 8/30/2022 1059  Last data filed at 8/30/2022 0900  Gross per 24 hour   Intake 480 ml   Output 1730 ml   Net -1250 ml          Body mass index is 28.8 kg/m².    Physical Exam  Vitals and nursing note reviewed.   Constitutional:       General: She is not in acute distress.     Appearance: She is ill-appearing.   HENT:      Head: Normocephalic and atraumatic.   Eyes:      General: No scleral icterus.         Right eye: No discharge.         Left eye: No discharge.      Extraocular Movements: Extraocular movements intact.   Cardiovascular:      Rate and Rhythm: Normal rate.   Pulmonary:      Effort: Pulmonary effort is normal. No tachypnea or respiratory distress.   Neurological:      General: No focal deficit present.      Mental Status: She is alert and oriented to person, place, and time.      Cranial Nerves: No cranial nerve deficit.      Motor: No weakness.   Psychiatric:         Mood and Affect: Mood and affect normal.         Speech: Speech normal.         Behavior: Behavior is cooperative.         Thought Content: Thought content normal.        Labs:  Recent Results (from the past 24 hour(s))   Comprehensive Metabolic Panel    Collection Time: 08/30/22  4:11 AM   Result Value Ref Range    Sodium 139 136 - 145 mmol/L    Potassium 4.3 3.5 - 5.1 mmol/L    Chloride 112 (H) 95 - 110 mmol/L    CO2 17 (L) 23 - 29 mmol/L    Glucose 100 70 - 110 mg/dL    BUN 18 6 - 20 mg/dL    Creatinine 1.3 0.5 - 1.4 mg/dL    Calcium 9.6 8.7 - 10.5 mg/dL    Total Protein 7.2 6.0 - 8.4 g/dL    Albumin 2.7 (L) 3.5 - 5.2 g/dL    Total Bilirubin 0.2 0.1 - 1.0 mg/dL    Alkaline Phosphatase 138 (H) 55 - 135 U/L    AST 13 10 - 40 U/L    ALT 15 10 - 44 U/L    Anion Gap 10 8 - 16 mmol/L    eGFR 47.4 (A) >60 mL/min/1.73 m^2   Magnesium    Collection Time: 08/30/22  4:11 AM   Result Value Ref Range    Magnesium 1.9 1.6 - 2.6 mg/dL   Phosphorus    Collection Time: 08/30/22  4:11 AM   Result Value Ref Range    Phosphorus 3.6 2.7 - 4.5 mg/dL        Lab Results   Component Value Date    BGH02YLTERSH Negative 08/26/2022       Recent Labs   Lab 08/24/22  0745 08/26/22  1810 08/26/22  2019 08/27/22  0254 08/28/22  0411 08/29/22  0559   WBC 13.33* 9.05  --  8.22 7.12 7.84   LYMPH 10.0*  1.3 18.5  1.7  --   --   --   --    HGB 12.7 12.5  --  12.5 12.0 11.8*   HCT 42.0 39.5   < > 39.7 39.0 37.8    203  --  171 245 279    < > = values in this  interval not displayed.       Recent Labs   Lab 08/28/22  0411 08/29/22  0559 08/30/22  0411    136 139   K 3.9 4.1 4.3   * 113* 112*   CO2 18* 16* 17*   BUN 23* 21* 18   CREATININE 1.6* 1.4 1.3    96 100   CALCIUM 9.7 9.7 9.6   MG 2.0 2.0 1.9   PHOS  --   --  3.6       Recent Labs   Lab 08/28/22  0411 08/29/22  0559 08/29/22  0854 08/30/22  0411   ALKPHOS 139* 156*  --  138*   ALT 14 15  --  15   AST 13 17  --  13   ALBUMIN 2.9* 2.8*  --  2.7*   PROT 7.6 7.5  --  7.2   BILITOT 0.2 0.2  --  0.2   INR  --   --  0.9  --           No results for input(s): DDIMER, FERRITIN, CRP, LDH, BNP, TROPONINI, CPK in the last 72 hours.    Invalid input(s): PROCALCITONIN    All labs within the last 24 hours were reviewed.     Microbiology:  Microbiology Results (last 7 days)       Procedure Component Value Units Date/Time    Urine culture [[id number]] Collected: 08/26/22 1943    Order Status: Completed Specimen: Urine Updated: 08/28/22 0002     Urine Culture, Routine Multiple organisms isolated. None in predominance.  Repeat if      clinically necessary.    Narrative:      Specimen Source->Urine              Imaging  ECG Results              EKG 12-lead (Final result)  Result time 08/27/22 14:23:35      Final result by Interface, Lab In [facility] (08/27/22 14:23:35)                   Narrative:    Test Reason : E87.5,    Vent. Rate : 082 BPM     Atrial Rate : 082 BPM     P-R Int : 132 ms          QRS Dur : 086 ms      QT Int : 372 ms       P-R-T Axes : 080 091 085 degrees     QTc Int : 434 ms    Normal sinus rhythm  Rightward axis  Borderline Abnormal ECG  When compared with ECG of 13-SEP-2021 16:03,  No significant change was found  Confirmed by CHINO BACK MD ([id number]) on 8/27/2022 2:23:26 PM    Referred By: [email or web address]   SELF           Confirmed By:CHINO BACK MD                                    Results for orders placed during the hospital encounter of 04/16/21    Echo Color Flow Doppler?  Yes    Interpretation Summary  · The left ventricle is normal in size with normal systolic function.  · The estimated ejection fraction is 65%.  · Normal left ventricular diastolic function.  · Normal right ventricular size with normal right ventricular systolic function.  · Normal central venous pressure (3 mmHg).      IR Nephrostogram through Existing access  Narrative: EXAMINATION:  Genitourinary catheter exchange    Procedural Personnel    Attending physician(s): Susan Cueva    Fellow physician(s): None    Resident physician(s): None    Advanced practice provider(s): None    Pre-procedure diagnosis: Ureteral obstruction    Post-procedure diagnosis: Same    Indication: Continued ureteral obstruction    Complications: No immediate complications.    CLINICAL HISTORY:  Patient with history of bilateral indwelling nephrostomy tubes less exchange 05/02/2022    TECHNIQUE:  PROCEDURE SUMMARY    - Antegrade nephrostogram via the existing access    - Additional procedure(s): None    COMPARISON:  Exchange 05/02/2022 and older    FINDINGS:  Pre-procedure    Consent: Informed consent for the procedure was obtained and time-out was performed prior to the procedure.    Preparation: The site was prepared and draped using maximal sterile barrier technique including cutaneous antisepsis.    Antibiotic administered: Prophylactic dose within 1 hour of procedure start time or 2 hours for vancomycin or fluoroquinolones    Anesthesia/sedation    Level of anesthesia/sedation: Moderate sedation (conscious sedation)    Anesthesia/sedation administered by: Independent trained observer under attending supervision with continuous monitoring of the patient's level of consciousness and physiologic status    Total intra-service sedation time (minutes): 50    Right genitourinary catheter exchange    Local anesthesia was administered.  Initially an Amplatz wire was attempted to be placed through the existing tube in the native kidney, which  was unsuccessful and a Glidewire was then used to remove the tube over a wire.  Significant encrustation was noted in the tube.  The new tube was advanced into position.    Collecting system dilation: Persistent moderate dilation    New catheter(s): 12 [place]    Final catheter position(s): Pigtail in the renal pelvis    External catheter securement: Non-absorbable suture    Additional findings: None    Left genitourinary catheter exchange    Local anesthesia was administered. A both an Amplatz and glide wire were attempted to be placed through the catheter combo unsuccessful due to the significant incrustation.  Next a 4 [place] the catheter and Glidewire were advanced adjacent to the indwelling tube common access to the kidney was obtained.  The indwelling tube was removed.  The new tube was advanced into position.    Collecting system dilation: Persistent mild dilation    New catheter(s): 12 [place]    Final catheter position(s): Pigtail in the renal pelvis    External catheter securement: Non-absorbable suture    Additional findings: None    Additional genitourinary system intervention    Genitourinary intervention: None    Location of intervention: Not applicable    Device used: Not applicable    Description of intervention: Not applicable    Post-intervention findings: Not applicable    Contrast    Contrast agent: Omnipaque 300    Contrast volume (mL): 5 0    Radiation Dose    Fluoroscopy time ( minutes): 16.1    Reference air kerma ( mGy): 69    Kerma area product ( mGy-cm2): 1653    Additional Details    Additional description of procedure: None    Equipment details: None    Specimens removed: None. A sample was not sent for analysis.    Estimated blood loss (mL): Less than 10    Standardized report: [email or web address]    Attestation    Signer name: Susan Cueva    I attest that I was present for the entire procedure. I reviewed the stored images and agree with the report as written.  Impression:  Bilateral 12 [place] PCN exchange, with significant encrustation early on the tubes.    Plan:    Plan for check and exchange in 2 months.  If there is concern for tube malpositioning or patency please contact IR    _______________________________________________________________    Electronically signed by: Susan Cueva  Date:    08/29/2022  Time:    14:42      All imaging within the last 24 hours was reviewed.       Discharge Planning   OK: 9/1/2022     Code Status: Full Code   Is the patient medically ready for discharge?: No    Reason for patient still in hospital (select all that apply): Patient trending condition, Treatment, and Consult recommendations  Discharge Plan A: Home with family      High Risk Conditions:  Patient is currently receiving parenteral controlled substances: Morphine

## 2022-08-30 NOTE — SUBJECTIVE & OBJECTIVE
Past Medical History:   Diagnosis Date    Abnormal mammogram 8/25/2020    Acute deep vein thrombosis (DVT) of lower extremity 12/9/2020    Anxiety     Cervical cancer 2014    Chronic back pain     Depression     Diarrhea due to malabsorption 11/14/2018    DVT of lower extremity, bilateral 11/4/2020    Fibromyalgia     Fungemia 9/27/2020    Generalized abdominal pain 8/25/2020    GERD (gastroesophageal reflux disease)     Hemifacial spasm 9/16/2015    Hiatal hernia 2014    History of cervical cancer 10/11/2018    Hx of psychiatric care     Cymbalta, trazodone    Hypertension     Hypomagnesemia 11/21/2018    Lactose intolerance     Metastatic squamous cell carcinoma to lymph node 10/11/2018    Nephrostomy tube displaced     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 drug 11/14/2018    Osteoarthritis of back     Peritonitis 9/22/2020    Pseudomonas urinary tract infection 4/21/2021    Psychiatric problem     Schatzki's ring 9/14/2015    Seen on outside EGD 05/2014, underwent esophageal dilatation. Bx were negative.     Stroke 1972    Urinary tract infection associated with nephrostomy catheter 6/11/2020    Wound infection after surgery 9/24/2020       Past Surgical History:   Procedure Laterality Date    ANTEGRADE NEPHROSTOGRAPHY Bilateral 9/28/2020    Procedure: Nephrostogram - antegrade;  Surgeon: Leslie Balbuena MD;  Location: [facility] OR [address] [place] [address];  Service: Urology;  Laterality: Bilateral;    ANTEGRADE NEPHROSTOGRAPHY Left 4/20/2021    Procedure: NEPHROSTOGRAM, ANTEGRADE;  Surgeon: Leslie Balbuena MD;  Location: [facility] OR [address] [place] [address];  Service: Urology;  Laterality: Left;    BILATERAL OOPHORECTOMY  2015    CHOLECYSTECTOMY  11/09/2016    Done at [facility], path showed chronic cholecystitis and gallstones    cold knife conization  2014    COLECTOMY, RIGHT  9/17/2020    Procedure: COLECTOMY, RIGHT Extended;  Surgeon: Hammad Reynolds MD;  Location: [facility] OR [address] [place] [address];  Service: Colon and Rectal;;    COLONOSCOPY  2014    COLONOSCOPY N/A  10/24/2016    at [facility], Dr Thomas    COLONOSCOPY N/A 5/18/2018    Procedure: COLONOSCOPY;  Surgeon: Arden Gutiérrez MD;  Location: [facility] (4TH FLR);  Service: Endoscopy;  Laterality: N/A;    COLONOSCOPY N/A 7/28/2020    Procedure: COLONOSCOPY;  Surgeon: Hammad Reynolds MD;  Location: [facility] (4TH FLR);  Service: Colon and Rectal;  Laterality: N/A;  covid test [place] 7/25    CYSTOSCOPY WITH URETEROSCOPY, RETROGRADE PYELOGRAPHY, AND INSERTION OF STENT Bilateral 3/21/2020    Procedure: CYSTOSCOPY, WITH RETROGRADE PYELOGRAM,;  Surgeon: Leslie Balbuena MD;  Location: [facility] OR 1ST FLR;  Service: Urology;  Laterality: Bilateral;    ESOPHAGOGASTRODUODENOSCOPY  2014    ESOPHAGOGASTRODUODENOSCOPY  11/18/2020    ESOPHAGOGASTRODUODENOSCOPY N/A 11/18/2020    Procedure: ESOPHAGOGASTRODUODENOSCOPY (EGD);  Surgeon: Zenon Spencer MD;  Location: [facility];  Service: Endoscopy;  Laterality: N/A;    ESOPHAGOGASTRODUODENOSCOPY N/A 12/11/2020    Procedure: EGD (ESOPHAGOGASTRODUODENOSCOPY);  Surgeon: Juancho Muse MD;  Location: [facility] (2ND FLR);  Service: Endoscopy;  Laterality: N/A;    HYSTERECTOMY  2014    [facility] for cervical cancer    ILEOSTOMY  9/17/2020    Procedure: CREATION, ILEOSTOMY;  Surgeon: Hammad Reynolds MD;  Location: [facility] OR 2ND FLR;  Service: Colon and Rectal;;    LOOPOGRAM N/A 4/20/2021    Procedure: LOOPOGRAM;  Surgeon: Leslie Balbuena MD;  Location: [facility] OR 1ST FLR;  Service: Urology;  Laterality: N/A;    MOBILIZATION OF SPLENIC FLEXURE N/A 9/11/2020    Procedure: MOBILIZATION, COLONIC;  Surgeon: Hammad Reynolds MD;  Location: [facility] OR 2ND FLR;  Service: Colon and Rectal;  Laterality: N/A;    NEPHROSTOGRAPHY Bilateral 4/17/2021    Procedure: Nephrostogram;  Surgeon: Celeste Surgeon;  Location: [facility];  Service: Anesthesiology;  Laterality: Bilateral;    OOPHORECTOMY      RETROPERITONEAL LYMPHADENECTOMY Right 9/17/2018    Procedure: LYMPHADENECTOMY, RETROPERITONEUM;  Surgeon: Sebas Reed MD;   Location: [facility] OR [address] [place] [address];  Service: General;  Laterality: Right;  ILIAC       Review of patient's allergies indicates:   Allergen Reactions    Bee sting [allergen ext-venom-honey bee]      Rash      Grass pollen-bermuda, standard      rash       Medications:  Medications Prior to Admission   Medication Sig    cefdinir (OMNICEF) 300 MG capsule Take 1 capsule (300 mg total) by mouth 2 (two) times daily. for 10 days    ergocalciferol (ERGOCALCIFEROL) 50,000 unit Cap Take 1 capsule (50,000 Units total) by mouth every 7 days.    gabapentin (NEURONTIN) 100 MG capsule Take 1 capsule (100 mg total) by mouth every evening.    melatonin (MELATIN) 3 mg tablet Take 2 tablets (6 mg total) by mouth nightly as needed for Insomnia.    mirtazapine (REMERON SOL-TAB) 15 MG disintegrating tablet Dissolve 1 tablet (15 mg total) by mouth nightly.    pantoprazole (PROTONIX) 40 MG tablet Take 1 tablet (40 mg total) by mouth once daily.    sodium bicarbonate 650 MG tablet Take 2 tablets (1,300 mg total) by mouth 2 (two) times daily.     Antibiotics (From admission, onward)      Start     Stop Route Frequency Ordered    08/29/22 1255  cefTRIAXone injection         --  Intra-op PRN 08/29/22 1255    08/26/22 2200  cefTRIAXone (ROCEPHIN) 1 g/50 mL D5W IVPB         09/02 2159 IV Every 24 hours (non-standard times) 08/26/22 2104          Antifungals (From admission, onward)      None          Antivirals (From admission, onward)      None             Immunization History   Administered Date(s) Administered    Influenza - Quadrivalent - PF *Preferred* (6 months and older) 11/16/2017, 09/19/2019    Tdap 03/28/2017       Family History       Problem Relation (Age of Onset)    Breast cancer Maternal Aunt    Cancer Father, Mother    Cervical cancer Mother    Colon cancer Father    Drug abuse Daughter, Daughter    Esophageal cancer Father    Heart disease Sister, Maternal Grandmother    Suicide Daughter          Social History     Socioeconomic  History    Marital status:      Spouse name: Hammad    Number of children: 2   Tobacco Use    Smoking status: Former    Smokeless tobacco: Never   Substance and Sexual Activity    Alcohol use: No     Alcohol/week: 0.0 standard drinks    Drug use: No    Sexual activity: Yes     Partners: Male     Birth control/protection: None     Comment:  19 years since    Social History Narrative    , twin daughters (1  2018), disabled due to childhood stroke, [employer]     Review of Systems   Constitutional:  Positive for activity change, appetite change and fatigue. Negative for chills, diaphoresis and fever.   HENT: Negative.     Eyes: Negative.    Respiratory:  Negative for cough and shortness of breath.    Cardiovascular:  Negative for chest pain.   Gastrointestinal:  Negative for abdominal pain, constipation and vomiting.   Genitourinary:  Positive for flank pain.   Musculoskeletal:  Positive for back pain.   Neurological:  Negative for speech difficulty and headaches.   Psychiatric/Behavioral:  Negative for agitation and confusion. The patient is not nervous/anxious.    Objective:     Vital Signs (Most Recent):  Temp: 98.2 °F (36.8 °C) (22 1158)  Pulse: 74 (22 1158)  Resp: 20 (22 1158)  BP: (!) 110/54 (22 1158)  SpO2: 99 % (22 1158)   Vital Signs (24h Range):  Temp:  [97.6 °F (36.4 °C)-98.6 °F (37 °C)] 98.2 °F (36.8 °C)  Pulse:  [71-90] 74  Resp:  [14-23] 20  SpO2:  [96 %-100 %] 99 %  BP: ()/(52-69) 110/54     Weight: 88.5 kg (195 lb)  Body mass index is 28.8 kg/m².    Estimated Creatinine Clearance: 55.2 mL/min (based on SCr of 1.3 mg/dL).    Physical Exam  Vitals reviewed.   Constitutional:       General: She is not in acute distress.     Appearance: She is not ill-appearing, toxic-appearing or diaphoretic.   HENT:      Head: Normocephalic and atraumatic.      Mouth/Throat:      Pharynx: Oropharynx is clear. No oropharyngeal exudate.    Eyes:      General: No scleral icterus.     Conjunctiva/sclera: Conjunctivae normal.   Cardiovascular:      Rate and Rhythm: Normal rate and regular rhythm.   Pulmonary:      Effort: Pulmonary effort is normal. No respiratory distress.   Abdominal:      General: There is no distension.      Palpations: Abdomen is soft.      Comments: Urostomy  Ileostomy with stool leaking   Genitourinary:     Comments: Bilateral nephrostomy tube in place. Clear yellow urine in bags  Musculoskeletal:         General: Tenderness present.   Skin:     General: Skin is warm and dry.      Findings: No rash.   Neurological:      Mental Status: She is alert.   Psychiatric:         Mood and Affect: Mood normal.         Behavior: Behavior normal.         Thought Content: Thought content normal.         Judgment: Judgment normal.       Significant Labs:   Microbiology Results (last 7 days)       Procedure Component Value Units Date/Time    Urine culture [[id number]] Collected: 08/26/22 1943    Order Status: Completed Specimen: Urine Updated: 08/28/22 0002     Urine Culture, Routine Multiple organisms isolated. None in predominance.  Repeat if      clinically necessary.    Narrative:      Specimen Source->Urine          Recent Lab Results         08/30/22  0411        Albumin 2.7       Alkaline Phosphatase 138       ALT 15       Anion Gap 10       AST 13       BILIRUBIN TOTAL 0.2  Comment: For infants and newborns, interpretation of results should be based  on gestational age, weight and in agreement with clinical  observations.    Premature Infant recommended reference ranges:  Up to 24 hours.............<8.0 mg/dL  Up to 48 hours............<12.0 mg/dL  3-5 days..................<15.0 mg/dL  6-29 days.................<15.0 mg/dL         BUN 18       Calcium 9.6       Chloride 112       CO2 17       Creatinine 1.3       eGFR 47.4       Glucose 100       Magnesium 1.9       Phosphorus 3.6       Potassium 4.3       PROTEIN TOTAL 7.2        Sodium 139               Significant Imaging:       IR Nephrostogram through Existing access [[id number]] Resulted: 08/29/22 1442   Order Status: Completed Updated: 08/29/22 1445   Narrative:     EXAMINATION:   Genitourinary catheter exchange     Procedural Personnel     Attending physician(s): Susan Cueva     Fellow physician(s): None     Resident physician(s): None     Advanced practice provider(s): None     Pre-procedure diagnosis: Ureteral obstruction     Post-procedure diagnosis: Same     Indication: Continued ureteral obstruction     Complications: No immediate complications.     CLINICAL HISTORY:   Patient with history of bilateral indwelling nephrostomy tubes less exchange 05/02/2022     TECHNIQUE:   PROCEDURE SUMMARY     - Antegrade nephrostogram via the existing access     - Additional procedure(s): None     COMPARISON:   Exchange 05/02/2022 and older     FINDINGS:   Pre-procedure     Consent: Informed consent for the procedure was obtained and time-out was performed prior to the procedure.     Preparation: The site was prepared and draped using maximal sterile barrier technique including cutaneous antisepsis.     Antibiotic administered: Prophylactic dose within 1 hour of procedure start time or 2 hours for vancomycin or fluoroquinolones       Anesthesia/sedation     Level of anesthesia/sedation: Moderate sedation (conscious sedation)     Anesthesia/sedation administered by: Independent trained observer under attending supervision with continuous monitoring of the patient's level of consciousness and physiologic status     Total intra-service sedation time (minutes): 50     Right genitourinary catheter exchange     Local anesthesia was administered.  Initially an Amplatz wire was attempted to be placed through the existing tube in the native kidney, which was unsuccessful and a Glidewire was then used to remove the tube over a wire.  Significant encrustation was noted in the tube.  The new tube was  advanced into position.     Collecting system dilation: Persistent moderate dilation     New catheter(s): 12 Serbian     Final catheter position(s): Pigtail in the renal pelvis     External catheter securement: Non-absorbable suture     Additional findings: None     Left genitourinary catheter exchange     Local anesthesia was administered. A both an Amplatz and glide wire were attempted to be placed through the catheter combo unsuccessful due to the significant incrustation.  Next a 4 Serbian Kumpe the catheter and Glidewire were advanced adjacent to the indwelling tube common access to the kidney was obtained.  The indwelling tube was removed.  The new tube was advanced into position.     Collecting system dilation: Persistent mild dilation     New catheter(s): 12 Serbian     Final catheter position(s): Pigtail in the renal pelvis     External catheter securement: Non-absorbable suture     Additional findings: None     Additional genitourinary system intervention     Genitourinary intervention: None     Location of intervention: Not applicable     Device used: Not applicable     Description of intervention: Not applicable     Post-intervention findings: Not applicable     Contrast     Contrast agent: Omnipaque 300     Contrast volume (mL): 5 0     Radiation Dose     Fluoroscopy time ( minutes): 16.1     Reference air kerma ( mGy): 69     Kerma area product ( mGy-cm2): 1653     Additional Details     Additional description of procedure: None     Equipment details: None     Specimens removed: None. A sample was not sent for analysis.     Estimated blood loss (mL): Less than 10     Standardized report: [email or web address]     Attestation     Signer name: Susan Hammad     I attest that I was present for the entire procedure. I reviewed the stored images and agree with the report as written.    Impression:       Bilateral 12 Serbian PCN exchange, with significant encrustation early on the tubes.     Plan:     Plan  for check and exchange in 2 months.  If there is concern for tube malpositioning or patency please contact IR     _______________________________________________________________       Electronically signed by: Susan Cueva   Date: 08/29/2022   Time: 14:42

## 2022-08-30 NOTE — PLAN OF CARE
[facility]    Following for possible IV antibiotics post discharge.     [facility]  Demetria Kline Rn, Clinical Educator  Cell [phone]  Office [phone]  Fax [phone]

## 2022-08-30 NOTE — HPI
57 year old female with HTN, fibromyalgia, DVT (on eliquis), CKD, cervical cancer s/p pelvic radiation c/b bilateral ureteral strictures and bilateral hydronephrosis requiring urinary diversion 9/2020 complicated by bowel perforation now with ileostomy and bilateral nephrostomy tubes c/b recurrent UTIs who presents with flank pain.    Patient was seen in the ED on 8/24 for left-sided flank pain and was diagnosed with left-sided pyelonephritis and discharged on Cefdinir. CT A/P showed right renal stones without hydronephrosis, Moderate left hydronephrosis and wall thickening of the left collecting system with punctate focus of air in the left collecting system. No left nephrolithiasis or ureteral stones. She returned to the ED on 8/26 with right flank pain and was admitted for further evaluation.     Patient states she has been suffering from flank pain for at least 3-4 days now. She reports  subjective fevers at home and nausea/vomiting. Of note, last nephrostomy tube exchange occurred in May. Patient afebrile but with a leukocytosis on admit. Urology consulted and recommended bilateral nephrostomy exchange with IR which was completed on 8/29. Planning for outpatient stone management. Repeat UA positive. Urine cx with multiple organisms. Patient is on Ceftriaxone. Remains afebrile. Leukocytosis resolved. Pain somewhat improved and n/v resolved. ID consulted for antibiotic recommendations.

## 2022-08-30 NOTE — ASSESSMENT & PLAN NOTE
- recently presented with pyelonephritis of the left kidney and was being treated with oral cefdinir, but presents this evening with right-sided flank pain with UA with >100 WBCs concerning for infection with known nephrolithiasis of the right kidney  - Urine culture from recent ED visit growing >100,000 CFUs of pan-sensitive Klebsiella pneumoniae  - start IV ceftriaxone 1 g q24h given sensitivities of Klebsiella pneumoniae but will f/u urine culture  - IV morphine PRN pain  - IV ondansetron PRN nausea/vomiting  - Urology following patient, recommend bilateral nephrostomy exchange with IR - IR completed on 8/29  - ID has follow patient in the past; consulted for recommendations for antibiotic choice and length therapy given complicated UTI - continue rocephin for now with cipro as outpatient until stones addressed  - patient will need to f/u with urology as outpatient for stone management

## 2022-08-30 NOTE — PLAN OF CARE
08/30/22 0749   Post-Acute Status   Post-Acute Authorization IV Infusion   IV Infusion Status Referral(s) sent     SW faxed referral to [facility] via [facility] for review. SW will follow up as needed.        Kira Dawkins LMSW  Case Management   [facility]   Ext. [phone]

## 2022-08-30 NOTE — PROGRESS NOTES
[facility] Medicine  Telemedicine Progress Note    Patient Name: Edita Riley  MRN: [id number]  Patient Class: IP- Inpatient   Admission Date: 8/26/2022  Length of Stay: 1 days  Attending Physician: Kiki Culp MD  Primary Care Provider: No primary care provider on file.          Subjective:     Principal Problem:Pyelonephritis        HPI:  Edita Riley is a 57-year-old woman with a past medical history of HTN, cervical cancer s/p surgery and pelvic radiation, open transverse colon conduit urinary diversion on 9/11/2020 complicated by bowel perforation s/p take back for hemicolectomy and ileostomy creation (9/17/20). She was seen 8/24 for left-sided flank pain and was diagnosed with left-sided pyelonephritis and discharged on Cefdinir, incidental right-sided 4mm and 7mm uretal stones noted at that time. Interim urine cultures grew pan-sensitive Klebsiella pneumoniae. She is here today with right-sided flank pain for two days likey from the stones. The pain is a constant stabbing pain that is worse with movement. She has tried tylenol for the pain without relief. She reports her left-sided pain has improved. She denies fever and chills. Both nephrostomy tubes are draining appropriately, urine is non-bloody and green, stoma looks healthy with green liquid bowel contents. She denies f/v, n/v. Nephrostomy tubes last exchanged May 2022, previously exchanged every 3 months.     Per further discussion on interview with the patient, she reports compliance with her cefdinir. She noted that the right flank pain had eased since the administration of IV narcotic pain medication. She denies any discharge or erythema surrounding the site of her nephrostomy tube. She reports low-grade temperatures and chills at home. She denies any abdominal pain, nausea or vomiting. She denies any hematuria in her nephrostomy bags. She is amenable to nephrostomy tube exchange in the morning with  Interventional Radiology.     In the emergency department, the patient was given acetaminophen and a dose of IV morphine.      Overview/Hospital Course:  Patient admitted to hospital medicine. Urology consulted. IR consulted.         This encounter was provided through telemedicine.  Patient was transferred to the telemedicine service on:  08/29/2022   The patient location is: 505/505 A admitted 8/26/2022  6:31 PM.  Present with the patient at the time of the telemed/virtual assessment: Telepresenter    Interval History/Overnight Events:     Right sided flank pain improved today; patient with nausea after procedure but now resolved; she does have abd pain at home with nausea and reflux symptoms - ID to evaluate and make antibiotic recs; trial of oral pain med; she also suffers with pain to her legs and gabapentin dose has not been increased in the recent past; urine is clear in ostomy bags today    Review of Systems   Constitutional:  Positive for activity change, appetite change and fatigue. Negative for fever.   Respiratory:  Negative for cough and shortness of breath.    Gastrointestinal:  Negative for diarrhea and vomiting.   Genitourinary:  Positive for flank pain.      Inpatient Medications:  Scheduled Meds:   cefTRIAXone (ROCEPHIN) IVPB  1 g Intravenous Q24H    gabapentin  100 mg Oral QHS    sodium bicarbonate  650 mg Oral BID     Continuous Infusions:  PRN Meds:.acetaminophen, cefTRIAXone (ROCEPHIN) IVPB, fentaNYL, fentaNYL, glucose, melatonin, midazolam, morphine, ondansetron, sodium chloride 0.9%, sodium chloride 0.9%      Objective:     Temp:  [97.6 °F (36.4 °C)-98.6 °F (37 °C)] 97.7 °F (36.5 °C)  Pulse:  [71-90] 82  Resp:  [14-23] 20  SpO2:  [96 %-100 %] 100 %  BP: ()/(52-69) 116/60      Intake/Output Summary (Last 24 hours) at 8/30/2022 1059  Last data filed at 8/30/2022 0900  Gross per 24 hour   Intake 480 ml   Output 1730 ml   Net -1250 ml          Body mass index is 28.8 kg/m².    Physical  Exam  Vitals and nursing note reviewed.   Constitutional:       General: She is not in acute distress.     Appearance: She is ill-appearing.   HENT:      Head: Normocephalic and atraumatic.   Eyes:      General: No scleral icterus.        Right eye: No discharge.         Left eye: No discharge.      Extraocular Movements: Extraocular movements intact.   Cardiovascular:      Rate and Rhythm: Normal rate.   Pulmonary:      Effort: Pulmonary effort is normal. No tachypnea or respiratory distress.   Neurological:      General: No focal deficit present.      Mental Status: She is alert and oriented to person, place, and time.      Cranial Nerves: No cranial nerve deficit.      Motor: No weakness.   Psychiatric:         Mood and Affect: Mood and affect normal.         Speech: Speech normal.         Behavior: Behavior is cooperative.         Thought Content: Thought content normal.        Labs:  Recent Results (from the past 24 hour(s))   Comprehensive Metabolic Panel    Collection Time: 08/30/22  4:11 AM   Result Value Ref Range    Sodium 139 136 - 145 mmol/L    Potassium 4.3 3.5 - 5.1 mmol/L    Chloride 112 (H) 95 - 110 mmol/L    CO2 17 (L) 23 - 29 mmol/L    Glucose 100 70 - 110 mg/dL    BUN 18 6 - 20 mg/dL    Creatinine 1.3 0.5 - 1.4 mg/dL    Calcium 9.6 8.7 - 10.5 mg/dL    Total Protein 7.2 6.0 - 8.4 g/dL    Albumin 2.7 (L) 3.5 - 5.2 g/dL    Total Bilirubin 0.2 0.1 - 1.0 mg/dL    Alkaline Phosphatase 138 (H) 55 - 135 U/L    AST 13 10 - 40 U/L    ALT 15 10 - 44 U/L    Anion Gap 10 8 - 16 mmol/L    eGFR 47.4 (A) >60 mL/min/1.73 m^2   Magnesium    Collection Time: 08/30/22  4:11 AM   Result Value Ref Range    Magnesium 1.9 1.6 - 2.6 mg/dL   Phosphorus    Collection Time: 08/30/22  4:11 AM   Result Value Ref Range    Phosphorus 3.6 2.7 - 4.5 mg/dL        Lab Results   Component Value Date    [id number] Negative 08/26/2022       Recent Labs   Lab 08/24/22  0745 08/26/22  1810 08/26/22 2019 08/27/22  0254 08/28/22  0411  08/29/22  0559   WBC 13.33* 9.05  --  8.22 7.12 7.84   LYMPH 10.0*  1.3 18.5  1.7  --   --   --   --    HGB 12.7 12.5  --  12.5 12.0 11.8*   HCT 42.0 39.5   < > 39.7 39.0 37.8    203  --  171 245 279    < > = values in this interval not displayed.       Recent Labs   Lab 08/28/22 0411 08/29/22  0559 08/30/22 0411    136 139   K 3.9 4.1 4.3   * 113* 112*   CO2 18* 16* 17*   BUN 23* 21* 18   CREATININE 1.6* 1.4 1.3    96 100   CALCIUM 9.7 9.7 9.6   MG 2.0 2.0 1.9   PHOS  --   --  3.6       Recent Labs   Lab 08/28/22 0411 08/29/22 0559 08/29/22  0854 08/30/22 0411   ALKPHOS 139* 156*  --  138*   ALT 14 15  --  15   AST 13 17  --  13   ALBUMIN 2.9* 2.8*  --  2.7*   PROT 7.6 7.5  --  7.2   BILITOT 0.2 0.2  --  0.2   INR  --   --  0.9  --           No results for input(s): DDIMER, FERRITIN, CRP, LDH, BNP, TROPONINI, CPK in the last 72 hours.    Invalid input(s): PROCALCITONIN    All labs within the last 24 hours were reviewed.     Microbiology:  Microbiology Results (last 7 days)       Procedure Component Value Units Date/Time    Urine culture [[id number]] Collected: 08/26/22 1943    Order Status: Completed Specimen: Urine Updated: 08/28/22 0002     Urine Culture, Routine Multiple organisms isolated. None in predominance.  Repeat if      clinically necessary.    Narrative:      Specimen Source->Urine              Imaging  ECG Results              EKG 12-lead (Final result)  Result time 08/27/22 14:23:35      Final result by Interface, Lab In [facility] (08/27/22 14:23:35)                   Narrative:    Test Reason : E87.5,    Vent. Rate : 082 BPM     Atrial Rate : 082 BPM     P-R Int : 132 ms          QRS Dur : 086 ms      QT Int : 372 ms       P-R-T Axes : 080 091 085 degrees     QTc Int : 434 ms    Normal sinus rhythm  Rightward axis  Borderline Abnormal ECG  When compared with ECG of 13-SEP-2021 16:03,  No significant change was found  Confirmed by CHINO BACK MD ([id number]) on 8/27/2022  2:23:26 PM    Referred By: [email or web address]   SELF           Confirmed By:CHINO BACK MD                                    Results for orders placed during the hospital encounter of 04/16/21    Echo Color Flow Doppler? Yes    Interpretation Summary  · The left ventricle is normal in size with normal systolic function.  · The estimated ejection fraction is 65%.  · Normal left ventricular diastolic function.  · Normal right ventricular size with normal right ventricular systolic function.  · Normal central venous pressure (3 mmHg).      IR Nephrostogram through Existing access  Narrative: EXAMINATION:  Genitourinary catheter exchange    Procedural Personnel    Attending physician(s): Susan Cueva    Fellow physician(s): None    Resident physician(s): None    Advanced practice provider(s): None    Pre-procedure diagnosis: Ureteral obstruction    Post-procedure diagnosis: Same    Indication: Continued ureteral obstruction    Complications: No immediate complications.    CLINICAL HISTORY:  Patient with history of bilateral indwelling nephrostomy tubes less exchange 05/02/2022    TECHNIQUE:  PROCEDURE SUMMARY    - Antegrade nephrostogram via the existing access    - Additional procedure(s): None    COMPARISON:  Exchange 05/02/2022 and older    FINDINGS:  Pre-procedure    Consent: Informed consent for the procedure was obtained and time-out was performed prior to the procedure.    Preparation: The site was prepared and draped using maximal sterile barrier technique including cutaneous antisepsis.    Antibiotic administered: Prophylactic dose within 1 hour of procedure start time or 2 hours for vancomycin or fluoroquinolones    Anesthesia/sedation    Level of anesthesia/sedation: Moderate sedation (conscious sedation)    Anesthesia/sedation administered by: Independent trained observer under attending supervision with continuous monitoring of the patient's level of consciousness and physiologic status    Total  intra-service sedation time (minutes): 50    Right genitourinary catheter exchange    Local anesthesia was administered.  Initially an Amplatz wire was attempted to be placed through the existing tube in the native kidney, which was unsuccessful and a Glidewire was then used to remove the tube over a wire.  Significant encrustation was noted in the tube.  The new tube was advanced into position.    Collecting system dilation: Persistent moderate dilation    New catheter(s): 12 Czech    Final catheter position(s): Pigtail in the renal pelvis    External catheter securement: Non-absorbable suture    Additional findings: None    Left genitourinary catheter exchange    Local anesthesia was administered. A both an Amplatz and glide wire were attempted to be placed through the catheter combo unsuccessful due to the significant incrustation.  Next a 4 Czech Kumpe the catheter and Glidewire were advanced adjacent to the indwelling tube common access to the kidney was obtained.  The indwelling tube was removed.  The new tube was advanced into position.    Collecting system dilation: Persistent mild dilation    New catheter(s): 12 Czech    Final catheter position(s): Pigtail in the renal pelvis    External catheter securement: Non-absorbable suture    Additional findings: None    Additional genitourinary system intervention    Genitourinary intervention: None    Location of intervention: Not applicable    Device used: Not applicable    Description of intervention: Not applicable    Post-intervention findings: Not applicable    Contrast    Contrast agent: Omnipaque 300    Contrast volume (mL): 5 0    Radiation Dose    Fluoroscopy time ( minutes): 16.1    Reference air kerma ( mGy): 69    Kerma area product ( mGy-cm2): 1653    Additional Details    Additional description of procedure: None    Equipment details: None    Specimens removed: None. A sample was not sent for analysis.    Estimated blood loss (mL): Less than  10    Standardized report: [email or web address]    Attestation    Signer name: Susan Cueva    I attest that I was present for the entire procedure. I reviewed the stored images and agree with the report as written.  Impression: Bilateral 12 Divehi PCN exchange, with significant encrustation early on the tubes.    Plan:    Plan for check and exchange in 2 months.  If there is concern for tube malpositioning or patency please contact IR    _______________________________________________________________    Electronically signed by: Susan Cueva  Date:    08/29/2022  Time:    14:42      All imaging within the last 24 hours was reviewed.       Discharge Planning   OK: 9/1/2022     Code Status: Full Code   Is the patient medically ready for discharge?: No    Reason for patient still in hospital (select all that apply): Patient trending condition, Treatment, and Consult recommendations  Discharge Plan A: Home with family      High Risk Conditions:  Patient is currently receiving parenteral controlled substances: Morphine          Assessment/Plan:      * Pyelonephritis  - recently presented with pyelonephritis of the left kidney and was being treated with oral cefdinir, but presents this evening with right-sided flank pain with UA with >100 WBCs concerning for infection with known nephrolithiasis of the right kidney  - Urine culture from recent ED visit growing >100,000 CFUs of pan-sensitive Klebsiella pneumoniae  - start IV ceftriaxone 1 g q24h given sensitivities of Klebsiella pneumoniae but will f/u urine culture  - IV morphine PRN pain  - IV ondansetron PRN nausea/vomiting  - Urology following patient, recommend bilateral nephrostomy exchange with IR - IR completed on 8/29  - ID has follow patient in the past; consulted for recommendations for antibiotic choice and length therapy given complicated UTI - continue rocephin for now with cipro as outpatient until stones addressed  - patient will need to f/u with  urology as outpatient for stone management    Nephrostomy status  Last exchanged this admit on 8/29 by IR      Metabolic acidosis  - patient presenting with a non-anion gap metabolic acidosis with bicarbonate of 12 likely secondary to acute on chronic kidney injury  - will continue to monitor, addressing acute kidney injury as noted  - continue na bicarb    Presence of urostomy  Urine present to ostomy despite nephrostomies in place  -nursing care prn      Ileostomy in place  Emptying and bag change prn with nursing assistance      Hydronephrosis        ODSESA (acute kidney injury)  Estimated Creatinine Clearance: 51.3 mL/min (based on SCr of 1.4 mg/dL).  Patient with acute kidney injury likely due to post-obstructive d/t obstruction secondary to cervical cancer and fibrosis ODESSA is currently worsening. Labs reviewed- Renal function/electrolytes with Estimated Creatinine Clearance: 51.3 mL/min (based on SCr of 1.4 mg/dL).   - gave 1 liter of lactated ringer's solution  - Monitor urine output and serial BMP and adjust therapy as needed  - Avoid nephrotoxins and renally dose meds for GFR listed above      Cervical cancer  - patient has a history of cervical cancer and is s/p surgical resection with pelvic radiation with a history of an open transverse colon conduit and urinary diversion complicated by bowel perforation and subsequently received hemicolectomy and ileostomy creation  - Wound Care consult placed for ostomy care          VTE Risk Mitigation (From admission, onward)         Ordered     IP VTE HIGH RISK PATIENT  Once         08/26/22 2104     Place sequential compression device  Until discontinued         08/26/22 2104     IP VTE HIGH RISK PATIENT  Once         08/26/22 2104     Place sequential compression device  Until discontinued         08/26/22 2104                    I have assessed these findings virtually using a telemed platform and with assistance of the bedside nurse.      The attending portion of  this evaluation, treatment, and documentation was performed per Kiki Culp MD via Telemedicine AudioVisual using the secure [employer] software platform with 2 way audio/video. The provider was located off-site and the patient is located in the hospital. The aforementioned video software was utilized to document the relevant history and physical exam    Kiki Culp MD  Department of [facility] Medicine   [facility] - Surgery

## 2022-08-30 NOTE — ASSESSMENT & PLAN NOTE
57 year old female with HTN, DVT, CKD, cervical cancer s/p pelvic radiation c/b bilateral ureteral strictures and bilateral hydronephrosis requiring urinary diversion 9/2020 managed with bilateral nephrostomy tubes who presents with flank pain.    CT A/P showed right renal stones without hydronephrosis, moderate left hydronephrosis and wall thickening of the left collecting system with punctate focus of air in the left collecting system. No left nephrolithiasis or ureteral stones.    Urology consulted and recommended bilateral nephrostomy exchange with IR which was completed on 8/29. Planning for outpatient stone management.     Urine culture 8/24 positive for Klebsiella pneumoniae. Repeat UA 8/26 positive. Urine cx with multiple organisms. Patient is on Ceftriaxone. Remains afebrile. Leukocytosis resolved. Reports slight symptomatic improvement though still suffering from pain. ID consulted for antibiotic recommendations.    Recommendations  · Continue IV Ceftriaxone while inpatient  · Once patient clinically improves, can transition to oral Ciprofloxacin at discharge.  · EKG reviewed, QTc is 420. Medication list reviewed. No identified interactions.  Patient counseled on proper administration - administer at least 2 hours before or after zinc, aluminum, magnesium,  calcium or iron containing oral products, including antacids, dairy products, vitamins  ·  Would continue Ciprofloxacin until definitive stone management  · Discussed antibiotic plan with ID staff. ID will follow.

## 2022-08-30 NOTE — PLAN OF CARE
Pt is AAOX4,VSS. Pt is progressing towards plan of care goals. Pain management has been assessed. Pt reports pain x1 this shift, controlled with PRN meds and reassessed. Bilat nephrostomy tubes in place, output documented. Ileostomy in place, and replaced. Urostomy in place, output documented. SCDs in place with frequent checks for skin breakdown. Fall precautions in place, no report of falls. Safety measures are in place bed in lowest position, wheels locked, call light within reach.

## 2022-08-30 NOTE — CONSULTS
[facility]  Wound Care    Patient Name:  Edita Riley   MRN:  [id number]  Date: 2022  Diagnosis: Pyelonephritis    History:     Past Medical History:   Diagnosis Date    Abnormal mammogram 2020    Acute deep vein thrombosis (DVT) of lower extremity 2020    Anxiety     Cervical cancer     Chronic back pain     Depression     Diarrhea due to malabsorption 2018    DVT of lower extremity, bilateral 2020    Fibromyalgia     Fungemia 2020    Generalized abdominal pain 2020    GERD (gastroesophageal reflux disease)     Hemifacial spasm 2015    Hiatal hernia 2014    History of cervical cancer 10/11/2018    Hx of psychiatric care     Cymbalta, trazodone    Hypertension     Hypomagnesemia 2018    Lactose intolerance     Metastatic squamous cell carcinoma to lymph node 10/11/2018    Nephrostomy tube displaced     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 drug 2018    Osteoarthritis of back     Peritonitis 2020    Pseudomonas urinary tract infection 2021    Psychiatric problem     Schatzki's ring 2015    Seen on outside EGD 2014, underwent esophageal dilatation. Bx were negative.     Stroke 1972    Urinary tract infection associated with nephrostomy catheter 2020    Wound infection after surgery 2020       Social History     Socioeconomic History    Marital status:      Spouse name: Hammad    Number of children: 2   Tobacco Use    Smoking status: Former    Smokeless tobacco: Never   Substance and Sexual Activity    Alcohol use: No     Alcohol/week: 0.0 standard drinks    Drug use: No    Sexual activity: Yes     Partners: Male     Birth control/protection: None     Comment:  19 years since    Social History Narrative    , twin daughters (1  2018), disabled due to childhood stroke, [employer]       Precautions:     Allergies as of 2022 - Reviewed 2022   Allergen Reaction  Noted    Bee sting [allergen ext-venom-honey bee]  05/04/2015    Grass pollen-bermuda, standard  05/04/2015       WOC Assessment Details/Treatment     Ostomy consult received on patient. Patient independent with ostomy care. Patient denies any ostomy needs or questions. Contact ostomy dept if needed.

## 2022-08-30 NOTE — CONSULTS
[facility] - [facility]  Infectious Disease  Consult Note    Patient Name: Edita Riley  MRN: [id number]  Admission Date: 8/26/2022  Hospital Length of Stay: 1 days  Attending Physician: Kiki Culp MD  Primary Care Provider: No primary care provider on file.     Isolation Status: No active isolations    Patient information was obtained from patient and past medical records.      Consults  Assessment/Plan:     * Pyelonephritis  57 year old female with HTN, DVT, CKD, cervical cancer s/p pelvic radiation c/b bilateral ureteral strictures and bilateral hydronephrosis requiring urinary diversion 9/2020 managed with bilateral nephrostomy tubes who presents with flank pain.    CT A/P showed right renal stones without hydronephrosis, moderate left hydronephrosis and wall thickening of the left collecting system with punctate focus of air in the left collecting system. No left nephrolithiasis or ureteral stones.    Urology consulted and recommended bilateral nephrostomy exchange with IR which was completed on 8/29. Planning for outpatient stone management.     Urine culture 8/24 positive for Klebsiella pneumoniae. Repeat UA 8/26 positive. Urine cx with multiple organisms. Patient is on Ceftriaxone. Remains afebrile. Leukocytosis resolved. Reports slight symptomatic improvement though still suffering from pain. ID consulted for antibiotic recommendations.    Recommendations  Continue IV Ceftriaxone while inpatient  Once patient clinically improves, can transition to oral Ciprofloxacin at discharge.  EKG reviewed, QTc is 420. Medication list reviewed. No identified interactions.  Patient counseled on proper administration - administer at least 2 hours before or after zinc, aluminum, magnesium,  calcium or iron containing oral products, including antacids, dairy products, vitamins   Would continue antibiotics until definitive stone management  Discussed antibiotic plan with ID staff. ID will follow.        Thank you  for the consult. Please call for any questions.  Megha Joseph PA-C  ID [id number] Spectra: [id number]    Subjective:     Principal Problem: Pyelonephritis    HPI: 57 year old female with HTN, fibromyalgia, DVT (on eliquis), CKD, cervical cancer s/p pelvic radiation c/b bilateral ureteral strictures and bilateral hydronephrosis requiring urinary diversion 9/2020 complicated by bowel perforation now with ileostomy and bilateral nephrostomy tubes c/b recurrent UTIs who presents with flank pain.    Patient was seen in the ED on 8/24 for left-sided flank pain and was diagnosed with left-sided pyelonephritis and discharged on Cefdinir. CT A/P showed right renal stones without hydronephrosis, Moderate left hydronephrosis and wall thickening of the left collecting system with punctate focus of air in the left collecting system. No left nephrolithiasis or ureteral stones. She returned to the ED on 8/26 with right flank pain and was admitted for further evaluation.     Patient states she has been suffering from flank pain for at least 3-4 days now. She reports  subjective fevers at home and nausea/vomiting. Of note, last nephrostomy tube exchange occurred in May. Patient afebrile but with a leukocytosis on admit. Urology consulted and recommended bilateral nephrostomy exchange with IR which was completed on 8/29. Planning for outpatient stone management. Repeat UA positive. Urine cx with multiple organisms. Patient is on Ceftriaxone. Remains afebrile. Leukocytosis resolved. Pain somewhat improved and n/v resolved. ID consulted for antibiotic recommendations.      Past Medical History:   Diagnosis Date    Abnormal mammogram 8/25/2020    Acute deep vein thrombosis (DVT) of lower extremity 12/9/2020    Anxiety     Cervical cancer 2014    Chronic back pain     Depression     Diarrhea due to malabsorption 11/14/2018    DVT of lower extremity, bilateral 11/4/2020    Fibromyalgia     Fungemia 9/27/2020    Generalized abdominal pain  8/25/2020    GERD (gastroesophageal reflux disease)     Hemifacial spasm 9/16/2015    Hiatal hernia 2014    History of cervical cancer 10/11/2018    Hx of psychiatric care     Cymbalta, trazodone    Hypertension     Hypomagnesemia 11/21/2018    Lactose intolerance     Metastatic squamous cell carcinoma to lymph node 10/11/2018    Nephrostomy tube displaced     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 drug 11/14/2018    Osteoarthritis of back     Peritonitis 9/22/2020    Pseudomonas urinary tract infection 4/21/2021    Psychiatric problem     Schatzki's ring 9/14/2015    Seen on outside EGD 05/2014, underwent esophageal dilatation. Bx were negative.     Stroke 1972    Urinary tract infection associated with nephrostomy catheter 6/11/2020    Wound infection after surgery 9/24/2020       Past Surgical History:   Procedure Laterality Date    ANTEGRADE NEPHROSTOGRAPHY Bilateral 9/28/2020    Procedure: Nephrostogram - antegrade;  Surgeon: Leslie Balbuena MD;  Location: [facility] OR [address] [place] [address];  Service: Urology;  Laterality: Bilateral;    ANTEGRADE NEPHROSTOGRAPHY Left 4/20/2021    Procedure: NEPHROSTOGRAM, ANTEGRADE;  Surgeon: Leslie Balbuena MD;  Location: [facility] OR [address] [place] [address];  Service: Urology;  Laterality: Left;    BILATERAL OOPHORECTOMY  2015    CHOLECYSTECTOMY  11/09/2016    Done at [facility], path showed chronic cholecystitis and gallstones    cold knife conization  2014    COLECTOMY, RIGHT  9/17/2020    Procedure: COLECTOMY, RIGHT Extended;  Surgeon: Hammad Reynolds MD;  Location: [facility] OR 2ND FLR;  Service: Colon and Rectal;;    COLONOSCOPY  2014    COLONOSCOPY N/A 10/24/2016    at [facility]    COLONOSCOPY N/A 5/18/2018    Procedure: COLONOSCOPY;  Surgeon: Arden Gutiérrez MD;  Location: [facility] ENDO (4TH FLR);  Service: Endoscopy;  Laterality: N/A;    COLONOSCOPY N/A 7/28/2020    Procedure: COLONOSCOPY;  Surgeon: Hammad Reynolds MD;  Location: [facility] ENDO (4TH FLR);  Service: Colon and Rectal;  Laterality: N/A;  covid test  wojciech 7/25    CYSTOSCOPY WITH URETEROSCOPY, RETROGRADE PYELOGRAPHY, AND INSERTION OF STENT Bilateral 3/21/2020    Procedure: CYSTOSCOPY, WITH RETROGRADE PYELOGRAM,;  Surgeon: Leslie Balbuena MD;  Location: [facility] OR 1ST FLR;  Service: Urology;  Laterality: Bilateral;    ESOPHAGOGASTRODUODENOSCOPY  2014    ESOPHAGOGASTRODUODENOSCOPY  11/18/2020    ESOPHAGOGASTRODUODENOSCOPY N/A 11/18/2020    Procedure: ESOPHAGOGASTRODUODENOSCOPY (EGD);  Surgeon: Zenon Spencer MD;  Location: [facility] ENDO;  Service: Endoscopy;  Laterality: N/A;    ESOPHAGOGASTRODUODENOSCOPY N/A 12/11/2020    Procedure: EGD (ESOPHAGOGASTRODUODENOSCOPY);  Surgeon: Juancho Muse MD;  Location: [facility] (2ND FLR);  Service: Endoscopy;  Laterality: N/A;    HYSTERECTOMY  2014    TV for cervical cancer    ILEOSTOMY  9/17/2020    Procedure: CREATION, ILEOSTOMY;  Surgeon: Hammad Reynolds MD;  Location: [facility] OR 2ND FLR;  Service: Colon and Rectal;;    LOOPOGRAM N/A 4/20/2021    Procedure: LOOPOGRAM;  Surgeon: Leslie Balbuena MD;  Location: [facility] OR 1ST FLR;  Service: Urology;  Laterality: N/A;    MOBILIZATION OF SPLENIC FLEXURE N/A 9/11/2020    Procedure: MOBILIZATION, COLONIC;  Surgeon: Hammad Reynolds MD;  Location: [facility] OR 2ND FLR;  Service: Colon and Rectal;  Laterality: N/A;    NEPHROSTOGRAPHY Bilateral 4/17/2021    Procedure: Nephrostogram;  Surgeon: Celeste Surgeon;  Location: [facility];  Service: Anesthesiology;  Laterality: Bilateral;    OOPHORECTOMY      RETROPERITONEAL LYMPHADENECTOMY Right 9/17/2018    Procedure: LYMPHADENECTOMY, RETROPERITONEUM;  Surgeon: Sebas Reed MD;  Location: [facility] OR 2ND FLR;  Service: General;  Laterality: Right;  ILIAC       Review of patient's allergies indicates:   Allergen Reactions    Bee sting [allergen ext-venom-honey bee]      Rash      Grass pollen-bermuda, standard      rash       Medications:  Medications Prior to Admission   Medication Sig    cefdinir (OMNICEF) 300 MG capsule Take 1 capsule (300 mg  total) by mouth 2 (two) times daily. for 10 days    ergocalciferol (ERGOCALCIFEROL) 50,000 unit Cap Take 1 capsule (50,000 Units total) by mouth every 7 days.    gabapentin (NEURONTIN) 100 MG capsule Take 1 capsule (100 mg total) by mouth every evening.    melatonin (MELATIN) 3 mg tablet Take 2 tablets (6 mg total) by mouth nightly as needed for Insomnia.    mirtazapine (REMERON SOL-TAB) 15 MG disintegrating tablet Dissolve 1 tablet (15 mg total) by mouth nightly.    pantoprazole (PROTONIX) 40 MG tablet Take 1 tablet (40 mg total) by mouth once daily.    sodium bicarbonate 650 MG tablet Take 2 tablets (1,300 mg total) by mouth 2 (two) times daily.     Antibiotics (From admission, onward)      Start     Stop Route Frequency Ordered    08/29/22 1255  cefTRIAXone injection         --  Intra-op PRN 08/29/22 1255    08/26/22 2200  cefTRIAXone (ROCEPHIN) 1 g/50 mL D5W IVPB         09/02 2159 IV Every 24 hours (non-standard times) 08/26/22 2104          Antifungals (From admission, onward)      None          Antivirals (From admission, onward)      None             Immunization History   Administered Date(s) Administered    Influenza - Quadrivalent - PF *Preferred* (6 months and older) 11/16/2017, 09/19/2019    Tdap 03/28/2017       Family History       Problem Relation (Age of Onset)    Breast cancer Maternal Aunt    Cancer Father, Mother    Cervical cancer Mother    Colon cancer Father    Drug abuse Daughter, Daughter    Esophageal cancer Father    Heart disease Sister, Maternal Grandmother    Suicide Daughter          Social History     Socioeconomic History    Marital status:      Spouse name: Hammad    Number of children: 2   Tobacco Use    Smoking status: Former    Smokeless tobacco: Never   Substance and Sexual Activity    Alcohol use: No     Alcohol/week: 0.0 standard drinks    Drug use: No    Sexual activity: Yes     Partners: Male     Birth control/protection: None     Comment:  19 years since 1999    Social History Narrative    , twin daughters (1  2018), disabled due to childhood stroke, [employer]astafarian sophia     Review of Systems   Constitutional:  Positive for activity change, appetite change and fatigue. Negative for chills, diaphoresis and fever.   HENT: Negative.     Eyes: Negative.    Respiratory:  Negative for cough and shortness of breath.    Cardiovascular:  Negative for chest pain.   Gastrointestinal:  Negative for abdominal pain, constipation and vomiting.   Genitourinary:  Positive for flank pain.   Musculoskeletal:  Positive for back pain.   Neurological:  Negative for speech difficulty and headaches.   Psychiatric/Behavioral:  Negative for agitation and confusion. The patient is not nervous/anxious.    Objective:     Vital Signs (Most Recent):  Temp: 98.2 °F (36.8 °C) (22 1158)  Pulse: 74 (22 1158)  Resp: 20 (22 1158)  BP: (!) 110/54 (22 1158)  SpO2: 99 % (22 1158)   Vital Signs (24h Range):  Temp:  [97.6 °F (36.4 °C)-98.6 °F (37 °C)] 98.2 °F (36.8 °C)  Pulse:  [71-90] 74  Resp:  [14-23] 20  SpO2:  [96 %-100 %] 99 %  BP: ()/(52-69) 110/54     Weight: 88.5 kg (195 lb)  Body mass index is 28.8 kg/m².    Estimated Creatinine Clearance: 55.2 mL/min (based on SCr of 1.3 mg/dL).    Physical Exam  Vitals reviewed.   Constitutional:       General: She is not in acute distress.     Appearance: She is not ill-appearing, toxic-appearing or diaphoretic.   HENT:      Head: Normocephalic and atraumatic.      Mouth/Throat:      Pharynx: Oropharynx is clear. No oropharyngeal exudate.   Eyes:      General: No scleral icterus.     Conjunctiva/sclera: Conjunctivae normal.   Cardiovascular:      Rate and Rhythm: Normal rate and regular rhythm.   Pulmonary:      Effort: Pulmonary effort is normal. No respiratory distress.   Abdominal:      General: There is no distension.      Palpations: Abdomen is soft.      Comments: Urostomy  Ileostomy with stool leaking    Genitourinary:     Comments: Bilateral nephrostomy tube in place. Clear yellow urine in bags  Musculoskeletal:         General: Tenderness present.   Skin:     General: Skin is warm and dry.      Findings: No rash.   Neurological:      Mental Status: She is alert.   Psychiatric:         Mood and Affect: Mood normal.         Behavior: Behavior normal.         Thought Content: Thought content normal.         Judgment: Judgment normal.       Significant Labs:   Microbiology Results (last 7 days)       Procedure Component Value Units Date/Time    Urine culture [[id number]] Collected: 08/26/22 1943    Order Status: Completed Specimen: Urine Updated: 08/28/22 0002     Urine Culture, Routine Multiple organisms isolated. None in predominance.  Repeat if      clinically necessary.    Narrative:      Specimen Source->Urine          Recent Lab Results         08/30/22  0411        Albumin 2.7       Alkaline Phosphatase 138       ALT 15       Anion Gap 10       AST 13       BILIRUBIN TOTAL 0.2  Comment: For infants and newborns, interpretation of results should be based  on gestational age, weight and in agreement with clinical  observations.    Premature Infant recommended reference ranges:  Up to 24 hours.............<8.0 mg/dL  Up to 48 hours............<12.0 mg/dL  3-5 days..................<15.0 mg/dL  6-29 days.................<15.0 mg/dL         BUN 18       Calcium 9.6       Chloride 112       CO2 17       Creatinine 1.3       eGFR 47.4       Glucose 100       Magnesium 1.9       Phosphorus 3.6       Potassium 4.3       PROTEIN TOTAL 7.2       Sodium 139               Significant Imaging:       IR Nephrostogram through Existing access [[id number]] Resulted: 08/29/22 1442   Order Status: Completed Updated: 08/29/22 1445   Narrative:     EXAMINATION:   Genitourinary catheter exchange     Procedural Personnel     Attending physician(s): Susan Cueva     Fellow physician(s): None     Resident physician(s): None      Advanced practice provider(s): None     Pre-procedure diagnosis: Ureteral obstruction     Post-procedure diagnosis: Same     Indication: Continued ureteral obstruction     Complications: No immediate complications.     CLINICAL HISTORY:   Patient with history of bilateral indwelling nephrostomy tubes less exchange 05/02/2022     TECHNIQUE:   PROCEDURE SUMMARY     - Antegrade nephrostogram via the existing access     - Additional procedure(s): None     COMPARISON:   Exchange 05/02/2022 and older     FINDINGS:   Pre-procedure     Consent: Informed consent for the procedure was obtained and time-out was performed prior to the procedure.     Preparation: The site was prepared and draped using maximal sterile barrier technique including cutaneous antisepsis.     Antibiotic administered: Prophylactic dose within 1 hour of procedure start time or 2 hours for vancomycin or fluoroquinolones       Anesthesia/sedation     Level of anesthesia/sedation: Moderate sedation (conscious sedation)     Anesthesia/sedation administered by: Independent trained observer under attending supervision with continuous monitoring of the patient's level of consciousness and physiologic status     Total intra-service sedation time (minutes): 50     Right genitourinary catheter exchange     Local anesthesia was administered.  Initially an Amplatz wire was attempted to be placed through the existing tube in the native kidney, which was unsuccessful and a Glidewire was then used to remove the tube over a wire.  Significant encrustation was noted in the tube.  The new tube was advanced into position.     Collecting system dilation: Persistent moderate dilation     New catheter(s): 12 Panamanian     Final catheter position(s): Pigtail in the renal pelvis     External catheter securement: Non-absorbable suture     Additional findings: None     Left genitourinary catheter exchange     Local anesthesia was administered. A both an Amplatz and glide wire  were attempted to be placed through the catheter combo unsuccessful due to the significant incrustation.  Next a 4 Ethiopian Kumpe the catheter and Glidewire were advanced adjacent to the indwelling tube common access to the kidney was obtained.  The indwelling tube was removed.  The new tube was advanced into position.     Collecting system dilation: Persistent mild dilation     New catheter(s): 12 Ethiopian     Final catheter position(s): Pigtail in the renal pelvis     External catheter securement: Non-absorbable suture     Additional findings: None     Additional genitourinary system intervention     Genitourinary intervention: None     Location of intervention: Not applicable     Device used: Not applicable     Description of intervention: Not applicable     Post-intervention findings: Not applicable     Contrast     Contrast agent: Omnipaque 300     Contrast volume (mL): 5 0     Radiation Dose     Fluoroscopy time ( minutes): 16.1     Reference air kerma ( mGy): 69     Kerma area product ( mGy-cm2): 1653     Additional Details     Additional description of procedure: None     Equipment details: None     Specimens removed: None. A sample was not sent for analysis.     Estimated blood loss (mL): Less than 10     Standardized report: [email or web address]     Attestation     Signer name: Susan Cueva     I attest that I was present for the entire procedure. I reviewed the stored images and agree with the report as written.    Impression:       Bilateral 12 Ethiopian PCN exchange, with significant encrustation early on the tubes.     Plan:     Plan for check and exchange in 2 months.  If there is concern for tube malpositioning or patency please contact IR     _______________________________________________________________       Electronically signed by: Susan Cueva   Date: 08/29/2022   Time: 14:42

## 2022-08-31 LAB
ALBUMIN SERPL BCP-MCNC: 2.5 G/DL (ref 3.5–5.2)
ALP SERPL-CCNC: 132 U/L (ref 55–135)
ALT SERPL W/O P-5'-P-CCNC: 11 U/L (ref 10–44)
ANION GAP SERPL CALC-SCNC: 7 MMOL/L (ref 8–16)
AST SERPL-CCNC: 11 U/L (ref 10–40)
BASOPHILS # BLD AUTO: 0.03 K/UL (ref 0–0.2)
BASOPHILS NFR BLD: 0.4 % (ref 0–1.9)
BILIRUB SERPL-MCNC: 0.2 MG/DL (ref 0.1–1)
BUN SERPL-MCNC: 17 MG/DL (ref 6–20)
CALCIUM SERPL-MCNC: 9.3 MG/DL (ref 8.7–10.5)
CHLORIDE SERPL-SCNC: 112 MMOL/L (ref 95–110)
CO2 SERPL-SCNC: 18 MMOL/L (ref 23–29)
CREAT SERPL-MCNC: 1.2 MG/DL (ref 0.5–1.4)
DIFFERENTIAL METHOD: ABNORMAL
EOSINOPHIL # BLD AUTO: 0.3 K/UL (ref 0–0.5)
EOSINOPHIL NFR BLD: 3.7 % (ref 0–8)
ERYTHROCYTE [DISTWIDTH] IN BLOOD BY AUTOMATED COUNT: 14.2 % (ref 11.5–14.5)
EST. GFR  (NO RACE VARIABLE): 52.1 ML/MIN/1.73 M^2
GLUCOSE SERPL-MCNC: 113 MG/DL (ref 70–110)
HCT VFR BLD AUTO: 36.1 % (ref 37–48.5)
HGB BLD-MCNC: 11.3 G/DL (ref 12–16)
IMM GRANULOCYTES # BLD AUTO: 0.03 K/UL (ref 0–0.04)
IMM GRANULOCYTES NFR BLD AUTO: 0.4 % (ref 0–0.5)
LYMPHOCYTES # BLD AUTO: 1.7 K/UL (ref 1–4.8)
LYMPHOCYTES NFR BLD: 20.7 % (ref 18–48)
MAGNESIUM SERPL-MCNC: 1.9 MG/DL (ref 1.6–2.6)
MCH RBC QN AUTO: 28.1 PG (ref 27–31)
MCHC RBC AUTO-ENTMCNC: 31.3 G/DL (ref 32–36)
MCV RBC AUTO: 90 FL (ref 82–98)
MONOCYTES # BLD AUTO: 1 K/UL (ref 0.3–1)
MONOCYTES NFR BLD: 12.7 % (ref 4–15)
NEUTROPHILS # BLD AUTO: 5 K/UL (ref 1.8–7.7)
NEUTROPHILS NFR BLD: 62.1 % (ref 38–73)
NRBC BLD-RTO: 0 /100 WBC
PHOSPHATE SERPL-MCNC: 2.8 MG/DL (ref 2.7–4.5)
PLATELET # BLD AUTO: 287 K/UL (ref 150–450)
PMV BLD AUTO: 9.8 FL (ref 9.2–12.9)
POTASSIUM SERPL-SCNC: 3.7 MMOL/L (ref 3.5–5.1)
PROT SERPL-MCNC: 6.8 G/DL (ref 6–8.4)
RBC # BLD AUTO: 4.02 M/UL (ref 4–5.4)
SODIUM SERPL-SCNC: 137 MMOL/L (ref 136–145)
WBC # BLD AUTO: 8.05 K/UL (ref 3.9–12.7)

## 2022-08-31 PROCEDURE — 63600175 PHARM REV CODE 636 W HCPCS: Performed by: STUDENT IN AN ORGANIZED HEALTH CARE EDUCATION/TRAINING PROGRAM

## 2022-08-31 PROCEDURE — 99232 PR SUBSEQUENT HOSPITAL CARE,LEVL II: ICD-10-PCS | Mod: 95,,, | Performed by: INTERNAL MEDICINE

## 2022-08-31 PROCEDURE — 99233 PR SUBSEQUENT HOSPITAL CARE,LEVL III: ICD-10-PCS | Mod: ,,, | Performed by: PHYSICIAN ASSISTANT

## 2022-08-31 PROCEDURE — 11000001 HC ACUTE MED/SURG PRIVATE ROOM

## 2022-08-31 PROCEDURE — 25000003 PHARM REV CODE 250: Performed by: STUDENT IN AN ORGANIZED HEALTH CARE EDUCATION/TRAINING PROGRAM

## 2022-08-31 PROCEDURE — 36415 COLL VENOUS BLD VENIPUNCTURE: CPT | Performed by: INTERNAL MEDICINE

## 2022-08-31 PROCEDURE — 99232 SBSQ HOSP IP/OBS MODERATE 35: CPT | Mod: 95,,, | Performed by: INTERNAL MEDICINE

## 2022-08-31 PROCEDURE — 25000003 PHARM REV CODE 250: Performed by: INTERNAL MEDICINE

## 2022-08-31 PROCEDURE — 83735 ASSAY OF MAGNESIUM: CPT | Performed by: INTERNAL MEDICINE

## 2022-08-31 PROCEDURE — 84100 ASSAY OF PHOSPHORUS: CPT | Performed by: INTERNAL MEDICINE

## 2022-08-31 PROCEDURE — 85025 COMPLETE CBC W/AUTO DIFF WBC: CPT | Performed by: INTERNAL MEDICINE

## 2022-08-31 PROCEDURE — 80053 COMPREHEN METABOLIC PANEL: CPT | Performed by: INTERNAL MEDICINE

## 2022-08-31 PROCEDURE — 99233 SBSQ HOSP IP/OBS HIGH 50: CPT | Mod: ,,, | Performed by: PHYSICIAN ASSISTANT

## 2022-08-31 RX ADMIN — SUCRALFATE 1 G: 1 TABLET ORAL at 09:08

## 2022-08-31 RX ADMIN — SUCRALFATE 1 G: 1 TABLET ORAL at 04:08

## 2022-08-31 RX ADMIN — OXYCODONE 5 MG: 5 TABLET ORAL at 11:08

## 2022-08-31 RX ADMIN — SODIUM BICARBONATE 650 MG TABLET 650 MG: at 09:08

## 2022-08-31 RX ADMIN — CEFTRIAXONE 1 G: 1 INJECTION, SOLUTION INTRAVENOUS at 09:08

## 2022-08-31 RX ADMIN — SODIUM BICARBONATE 650 MG TABLET 650 MG: at 08:08

## 2022-08-31 RX ADMIN — SUCRALFATE 1 G: 1 TABLET ORAL at 11:08

## 2022-08-31 RX ADMIN — GABAPENTIN 200 MG: 100 CAPSULE ORAL at 09:08

## 2022-08-31 RX ADMIN — SUCRALFATE 1 G: 1 TABLET ORAL at 05:08

## 2022-08-31 NOTE — PLAN OF CARE
"MD reports patient expressing concerns about needs in home related to transportation and care. MD reports patient's spouse does not allow home services for patient as he does not want anyone in the home. Met with patient at bedside to discuss planned d/c on 9/1/22 per Dr. Culp. Patient clarified that spouse will allow her to receive services at home but has always told her "You don't need that help because I will help you" Patient describes her spouse as a devoted caregiver who does "Too Much" for her at the detriment of his own health. Patient concerned she is a burden to her  who is in his 70's in poor health and wishes she had more help to relieve the pressure she believes her health needs place on him. Ambulatory referral placed to [facility] and [facility]. SW to also visit and provide resources for meals on wheels, Medicaid waiver program for attendants at home and medicaid transportation. Updated Yasmeen REYES from virtual team. Will continue to follow for needs.  "

## 2022-08-31 NOTE — SUBJECTIVE & OBJECTIVE
This encounter was provided through telemedicine.  Patient was transferred to the telemedicine service on:  08/29/2022   The patient location is: 505/505 A admitted 8/26/2022  6:31 PM.  Present with the patient at the time of the telemed/virtual assessment: Telepresenter    Interval History/Overnight Events:     Complains of 9/10 pain to bilateral flanks but has not taken any pain meds in the past 24 hours - she expressed stress about her home situation with she and her  being chronically ill and only having her daughter to assist; she also has difficulty getting to appointments as her  has numerous appts as well  -case management to explore resources for home    Review of Systems   Constitutional:  Positive for activity change, appetite change and fatigue. Negative for fever.   Respiratory:  Negative for cough and shortness of breath.    Gastrointestinal:  Negative for diarrhea and vomiting.   Genitourinary:  Positive for flank pain.      Inpatient Medications:  Scheduled Meds:   cefTRIAXone (ROCEPHIN) IVPB  1 g Intravenous Q24H    gabapentin  200 mg Oral QHS    sodium bicarbonate  650 mg Oral BID    sucralfate  1 g Oral QID (AC & HS)     Continuous Infusions:  PRN Meds:.acetaminophen, cefTRIAXone (ROCEPHIN) IVPB, glucose, melatonin, midazolam, ondansetron, oxyCODONE, sodium chloride 0.9%, sodium chloride 0.9%      Objective:     Temp:  [97.7 °F (36.5 °C)-98.4 °F (36.9 °C)] 98.2 °F (36.8 °C)  Pulse:  [74-85] 85  Resp:  [16-20] 18  SpO2:  [95 %-99 %] 96 %  BP: ()/(50-63) 129/63      Intake/Output Summary (Last 24 hours) at 8/31/2022 1109  Last data filed at 8/31/2022 0500  Gross per 24 hour   Intake 240 ml   Output 1135 ml   Net -895 ml          Body mass index is 28.8 kg/m².    Physical Exam  Vitals and nursing note reviewed.   Constitutional:       General: She is not in acute distress.     Appearance: She is ill-appearing.   HENT:      Head: Normocephalic and atraumatic.   Eyes:       General: No scleral icterus.        Right eye: No discharge.         Left eye: No discharge.      Extraocular Movements: Extraocular movements intact.   Cardiovascular:      Rate and Rhythm: Normal rate.   Pulmonary:      Effort: Pulmonary effort is normal. No tachypnea or respiratory distress.   Neurological:      General: No focal deficit present.      Mental Status: She is alert and oriented to person, place, and time.      Cranial Nerves: No cranial nerve deficit.      Motor: No weakness.   Psychiatric:         Mood and Affect: Mood and affect normal.         Speech: Speech normal.         Behavior: Behavior is cooperative.         Thought Content: Thought content normal.        Labs:  Recent Results (from the past 24 hour(s))   CBC Auto Differential    Collection Time: 08/31/22  4:45 AM   Result Value Ref Range    WBC 8.05 3.90 - 12.70 K/uL    RBC 4.02 4.00 - 5.40 M/uL    Hemoglobin 11.3 (L) 12.0 - 16.0 g/dL    Hematocrit 36.1 (L) 37.0 - 48.5 %    MCV 90 82 - 98 fL    MCH 28.1 27.0 - 31.0 pg    MCHC 31.3 (L) 32.0 - 36.0 g/dL    RDW 14.2 11.5 - 14.5 %    Platelets 287 150 - 450 K/uL    MPV 9.8 9.2 - 12.9 fL    Immature Granulocytes 0.4 0.0 - 0.5 %    Gran # (ANC) 5.0 1.8 - 7.7 K/uL    Immature Grans (Abs) 0.03 0.00 - 0.04 K/uL    Lymph # 1.7 1.0 - 4.8 K/uL    Mono # 1.0 0.3 - 1.0 K/uL    Eos # 0.3 0.0 - 0.5 K/uL    Baso # 0.03 0.00 - 0.20 K/uL    nRBC 0 0 /100 WBC    Gran % 62.1 38.0 - 73.0 %    Lymph % 20.7 18.0 - 48.0 %    Mono % 12.7 4.0 - 15.0 %    Eosinophil % 3.7 0.0 - 8.0 %    Basophil % 0.4 0.0 - 1.9 %    Differential Method Automated    Comprehensive Metabolic Panel    Collection Time: 08/31/22  4:45 AM   Result Value Ref Range    Sodium 137 136 - 145 mmol/L    Potassium 3.7 3.5 - 5.1 mmol/L    Chloride 112 (H) 95 - 110 mmol/L    CO2 18 (L) 23 - 29 mmol/L    Glucose 113 (H) 70 - 110 mg/dL    BUN 17 6 - 20 mg/dL    Creatinine 1.2 0.5 - 1.4 mg/dL    Calcium 9.3 8.7 - 10.5 mg/dL    Total Protein 6.8 6.0 -  8.4 g/dL    Albumin 2.5 (L) 3.5 - 5.2 g/dL    Total Bilirubin 0.2 0.1 - 1.0 mg/dL    Alkaline Phosphatase 132 55 - 135 U/L    AST 11 10 - 40 U/L    ALT 11 10 - 44 U/L    Anion Gap 7 (L) 8 - 16 mmol/L    eGFR 52.1 (A) >60 mL/min/1.73 m^2   Magnesium    Collection Time: 08/31/22  4:45 AM   Result Value Ref Range    Magnesium 1.9 1.6 - 2.6 mg/dL   Phosphorus    Collection Time: 08/31/22  4:45 AM   Result Value Ref Range    Phosphorus 2.8 2.7 - 4.5 mg/dL        Lab Results   Component Value Date    EAP10EIQIREE Negative 08/26/2022       Recent Labs   Lab 08/26/22  1810 08/26/22 2019 08/28/22 0411 08/29/22  0559 08/31/22  0445   WBC 9.05   < > 7.12 7.84 8.05   LYMPH 18.5  1.7  --   --   --  20.7  1.7   HGB 12.5   < > 12.0 11.8* 11.3*   HCT 39.5   < > 39.0 37.8 36.1*      < > 245 279 287    < > = values in this interval not displayed.       Recent Labs   Lab 08/29/22 0559 08/30/22 0411 08/31/22  0445    139 137   K 4.1 4.3 3.7   * 112* 112*   CO2 16* 17* 18*   BUN 21* 18 17   CREATININE 1.4 1.3 1.2   GLU 96 100 113*   CALCIUM 9.7 9.6 9.3   MG 2.0 1.9 1.9   PHOS  --  3.6 2.8       Recent Labs   Lab 08/29/22  0559 08/29/22  0854 08/30/22 0411 08/31/22  0445   ALKPHOS 156*  --  138* 132   ALT 15  --  15 11   AST 17  --  13 11   ALBUMIN 2.8*  --  2.7* 2.5*   PROT 7.5  --  7.2 6.8   BILITOT 0.2  --  0.2 0.2   INR  --  0.9  --   --           No results for input(s): DDIMER, FERRITIN, CRP, LDH, BNP, TROPONINI, CPK in the last 72 hours.    Invalid input(s): PROCALCITONIN    All labs within the last 24 hours were reviewed.     Microbiology:  Microbiology Results (last 7 days)       Procedure Component Value Units Date/Time    Urine culture [[id number]] Collected: 08/26/22 1943    Order Status: Completed Specimen: Urine Updated: 08/28/22 0002     Urine Culture, Routine Multiple organisms isolated. None in predominance.  Repeat if      clinically necessary.    Narrative:      Specimen Source->Urine               Imaging  ECG Results              EKG 12-lead (Final result)  Result time 08/27/22 14:23:35      Final result by Interface, Lab In [facility] (08/27/22 14:23:35)                   Narrative:    Test Reason : E87.5,    Vent. Rate : 082 BPM     Atrial Rate : 082 BPM     P-R Int : 132 ms          QRS Dur : 086 ms      QT Int : 372 ms       P-R-T Axes : 080 091 085 degrees     QTc Int : 434 ms    Normal sinus rhythm  Rightward axis  Borderline Abnormal ECG  When compared with ECG of 13-SEP-2021 16:03,  No significant change was found  Confirmed by CHINO BACK MD ([id number]) on 8/27/2022 2:23:26 PM    Referred By: [email or web address]   SELF           Confirmed By:CHINO BACK MD                                    Results for orders placed during the hospital encounter of 04/16/21    Echo Color Flow Doppler? Yes    Interpretation Summary  · The left ventricle is normal in size with normal systolic function.  · The estimated ejection fraction is 65%.  · Normal left ventricular diastolic function.  · Normal right ventricular size with normal right ventricular systolic function.  · Normal central venous pressure (3 mmHg).      IR Nephrostogram through Existing access  Narrative: EXAMINATION:  Genitourinary catheter exchange    Procedural Personnel    Attending physician(s): Susan Cueva    Fellow physician(s): None    Resident physician(s): None    Advanced practice provider(s): None    Pre-procedure diagnosis: Ureteral obstruction    Post-procedure diagnosis: Same    Indication: Continued ureteral obstruction    Complications: No immediate complications.    CLINICAL HISTORY:  Patient with history of bilateral indwelling nephrostomy tubes less exchange 05/02/2022    TECHNIQUE:  PROCEDURE SUMMARY    - Antegrade nephrostogram via the existing access    - Additional procedure(s): None    COMPARISON:  Exchange 05/02/2022 and older    FINDINGS:  Pre-procedure    Consent: Informed consent for the procedure was obtained and  time-out was performed prior to the procedure.    Preparation: The site was prepared and draped using maximal sterile barrier technique including cutaneous antisepsis.    Antibiotic administered: Prophylactic dose within 1 hour of procedure start time or 2 hours for vancomycin or fluoroquinolones    Anesthesia/sedation    Level of anesthesia/sedation: Moderate sedation (conscious sedation)    Anesthesia/sedation administered by: Independent trained observer under attending supervision with continuous monitoring of the patient's level of consciousness and physiologic status    Total intra-service sedation time (minutes): 50    Right genitourinary catheter exchange    Local anesthesia was administered.  Initially an Amplatz wire was attempted to be placed through the existing tube in the native kidney, which was unsuccessful and a Glidewire was then used to remove the tube over a wire.  Significant encrustation was noted in the tube.  The new tube was advanced into position.    Collecting system dilation: Persistent moderate dilation    New catheter(s): 12 Moz[place]    Final catheter position(s): Pigtail in the renal pelvis    External catheter securement: Non-absorbable suture    Additional findings: None    Left genitourinary catheter exchange    Local anesthesia was administered. A both an Amplatz and glide wire were attempted to be placed through the catheter combo unsuccessful due to the significant incrustation.  Next a 4 Moz[place]ican Kumpe the catheter and Glidewire were advanced adjacent to the indwelling tube common access to the kidney was obtained.  The indwelling tube was removed.  The new tube was advanced into position.    Collecting system dilation: Persistent mild dilation    New catheter(s): 12 [place]    Final catheter position(s): Pigtail in the renal pelvis    External catheter securement: Non-absorbable suture    Additional findings: None    Additional genitourinary system intervention    Genitourinary  intervention: None    Location of intervention: Not applicable    Device used: Not applicable    Description of intervention: Not applicable    Post-intervention findings: Not applicable    Contrast    Contrast agent: Omnipaque 300    Contrast volume (mL): 5 0    Radiation Dose    Fluoroscopy time ( minutes): 16.1    Reference air kerma ( mGy): 69    Kerma area product ( mGy-cm2): 1653    Additional Details    Additional description of procedure: None    Equipment details: None    Specimens removed: None. A sample was not sent for analysis.    Estimated blood loss (mL): Less than 10    Standardized report: [email or web address]    Attestation    Signer name: Susan Cueva    I attest that I was present for the entire procedure. I reviewed the stored images and agree with the report as written.  Impression: Bilateral 12 Polish PCN exchange, with significant encrustation early on the tubes.    Plan:    Plan for check and exchange in 2 months.  If there is concern for tube malpositioning or patency please contact IR    _______________________________________________________________    Electronically signed by: Susan Cueva  Date:    08/29/2022  Time:    14:42      All imaging within the last 24 hours was reviewed.       Discharge Planning   OK: 9/2/2022     Code Status: Full Code   Is the patient medically ready for discharge?: No    Reason for patient still in hospital (select all that apply): Patient trending condition, Treatment, and Consult recommendations  Discharge Plan A: Home with family

## 2022-08-31 NOTE — PLAN OF CARE
"   08/31/22 1333   Post-Acute Status   Post-Acute Authorization Other   Other Status Community Services     SW met with the patient at bedside to discuss pt's environment and resources needed within the community.Pt's spouse contacted the patient via hospital phone during the initial of the discussion, SW spoke to both  and pt.  SW completed a referral to [employer] Whe[employer] via online, provided pt with contact information and directions to follow up on referral. SW also discussed "Medicaid [place] Program" both declined, however open to receiving paperwork if services needed for a later date.SW also explained and provided transportation information ([employer]).  SW returned with information as it relates to filling for disability, per pt's request.     Kira Dawkins, EDWARDO  Case Management   [facility]   Ext. [phone]      "

## 2022-08-31 NOTE — SUBJECTIVE & OBJECTIVE
Interval History:     Patient seen at bedside. Dr. Culp present via [facility] medicine. NAEON. Afebrile. Tolerating antibiotics. Reports 9/10 flank pain, though hasn't gotten pain medication in 24 hours. Patient is worried about stressors at home and uncontrolled pain at home.    Review of Systems   Constitutional:  Positive for activity change, appetite change and fatigue. Negative for chills, diaphoresis and fever.   HENT: Negative.     Eyes: Negative.    Respiratory:  Negative for cough and shortness of breath.    Cardiovascular:  Negative for chest pain.   Gastrointestinal:  Negative for abdominal pain, constipation and vomiting.   Genitourinary:  Positive for flank pain. Negative for hematuria.   Musculoskeletal:  Positive for back pain.   Neurological:  Negative for speech difficulty and headaches.   Psychiatric/Behavioral:  Negative for agitation and confusion. The patient is not nervous/anxious.    Objective:     Vital Signs (Most Recent):  Temp: 97.4 °F (36.3 °C) (08/31/22 1115)  Pulse: 83 (08/31/22 1115)  Resp: 14 (08/31/22 1124)  BP: (!) 107/58 (08/31/22 1115)  SpO2: 97 % (08/31/22 1115)   Vital Signs (24h Range):  Temp:  [97.4 °F (36.3 °C)-98.4 °F (36.9 °C)] 97.4 °F (36.3 °C)  Pulse:  [76-85] 83  Resp:  [14-18] 14  SpO2:  [95 %-98 %] 97 %  BP: ()/(50-63) 107/58     Weight: 88.5 kg (195 lb)  Body mass index is 28.8 kg/m².    Estimated Creatinine Clearance: 59.8 mL/min (based on SCr of 1.2 mg/dL).    Physical Exam  Vitals reviewed.   Constitutional:       General: She is not in acute distress.     Appearance: She is not ill-appearing, toxic-appearing or diaphoretic.   HENT:      Head: Normocephalic and atraumatic.      Nose: Nose normal. No congestion.      Mouth/Throat:      Pharynx: Oropharynx is clear. No oropharyngeal exudate.   Eyes:      General: No scleral icterus.     Conjunctiva/sclera: Conjunctivae normal.   Cardiovascular:      Rate and Rhythm: Normal rate and regular rhythm.   Pulmonary:       Effort: Pulmonary effort is normal. No respiratory distress.   Abdominal:      General: There is no distension.      Palpations: Abdomen is soft.      Comments: Urostomy  Ileostomy   Genitourinary:     Comments: Bilateral nephrostomy tube in place.  Sites dressed. Clear yellow urine in bags  Musculoskeletal:         General: Tenderness present.   Skin:     General: Skin is warm and dry.      Findings: No rash.   Neurological:      Mental Status: She is alert and oriented to person, place, and time.   Psychiatric:         Mood and Affect: Mood normal.         Behavior: Behavior normal.         Thought Content: Thought content normal.         Judgment: Judgment normal.       Significant Labs:   Recent Lab Results         08/31/22  0445        Albumin 2.5       Alkaline Phosphatase 132       ALT 11       Anion Gap 7       AST 11       Baso # 0.03       Basophil % 0.4       BILIRUBIN TOTAL 0.2  Comment: For infants and newborns, interpretation of results should be based  on gestational age, weight and in agreement with clinical  observations.    Premature Infant recommended reference ranges:  Up to 24 hours.............<8.0 mg/dL  Up to 48 hours............<12.0 mg/dL  3-5 days..................<15.0 mg/dL  6-29 days.................<15.0 mg/dL         BUN 17       Calcium 9.3       Chloride 112       CO2 18       Creatinine 1.2       Differential Method Automated       eGFR 52.1       Eos # 0.3       Eosinophil % 3.7       Glucose 113       Gran # (ANC) 5.0       Gran % 62.1       Hematocrit 36.1       Hemoglobin 11.3       Immature Grans (Abs) 0.03  Comment: Mild elevation in immature granulocytes is non specific and   can be seen in a variety of conditions including stress response,   acute inflammation, trauma and pregnancy. Correlation with other   laboratory and clinical findings is essential.         Immature Granulocytes 0.4       Lymph # 1.7       Lymph % 20.7       Magnesium 1.9       MCH 28.1       MCHC  31.3       MCV 90       Mono # 1.0       Mono % 12.7       MPV 9.8       nRBC 0       Phosphorus 2.8       Platelets 287       Potassium 3.7       PROTEIN TOTAL 6.8       RBC 4.02       RDW 14.2       Sodium 137       WBC 8.05               Significant Imaging:     IR Nephrostogram through Existing access [[id number]] Resulted: 08/29/22 1442   Order Status: Completed Updated: 08/29/22 1445   Narrative:     EXAMINATION:   Genitourinary catheter exchange     Procedural Personnel     Attending physician(s): Susan Cueva     Fellow physician(s): None     Resident physician(s): None     Advanced practice provider(s): None     Pre-procedure diagnosis: Ureteral obstruction     Post-procedure diagnosis: Same     Indication: Continued ureteral obstruction     Complications: No immediate complications.     CLINICAL HISTORY:   Patient with history of bilateral indwelling nephrostomy tubes less exchange 05/02/2022     TECHNIQUE:   PROCEDURE SUMMARY     - Antegrade nephrostogram via the existing access     - Additional procedure(s): None     COMPARISON:   Exchange 05/02/2022 and older     FINDINGS:   Pre-procedure     Consent: Informed consent for the procedure was obtained and time-out was performed prior to the procedure.     Preparation: The site was prepared and draped using maximal sterile barrier technique including cutaneous antisepsis.     Antibiotic administered: Prophylactic dose within 1 hour of procedure start time or 2 hours for vancomycin or fluoroquinolones       Anesthesia/sedation     Level of anesthesia/sedation: Moderate sedation (conscious sedation)     Anesthesia/sedation administered by: Independent trained observer under attending supervision with continuous monitoring of the patient's level of consciousness and physiologic status     Total intra-service sedation time (minutes): 50     Right genitourinary catheter exchange     Local anesthesia was administered.  Initially an Amplatz wire was attempted to  be placed through the existing tube in the native kidney, which was unsuccessful and a Glidewire was then used to remove the tube over a wire.  Significant encrustation was noted in the tube.  The new tube was advanced into position.     Collecting system dilation: Persistent moderate dilation     New catheter(s): 12 [place]     Final catheter position(s): Pigtail in the renal pelvis     External catheter securement: Non-absorbable suture     Additional findings: None     Left genitourinary catheter exchange     Local anesthesia was administered. A both an Amplatz and glide wire were attempted to be placed through the catheter combo unsuccessful due to the significant incrustation.  Next a 4 Marshallese Kumpe the catheter and Glidewire were advanced adjacent to the indwelling tube common access to the kidney was obtained.  The indwelling tube was removed.  The new tube was advanced into position.     Collecting system dilation: Persistent mild dilation     New catheter(s): 12 [place]ese     Final catheter position(s): Pigtail in the renal pelvis     External catheter securement: Non-absorbable suture     Additional findings: None     Additional genitourinary system intervention     Genitourinary intervention: None     Location of intervention: Not applicable     Device used: Not applicable     Description of intervention: Not applicable     Post-intervention findings: Not applicable     Contrast     Contrast agent: Omnipaque 300     Contrast volume (mL): 5 0     Radiation Dose     Fluoroscopy time ( minutes): 16.1     Reference air kerma ( mGy): 69     Kerma area product ( mGy-cm2): 1653     Additional Details     Additional description of procedure: None     Equipment details: None     Specimens removed: None. A sample was not sent for analysis.     Estimated blood loss (mL): Less than 10     Standardized report: [email or web address]     Attestation     Signer name: Susan Cueva     I attest that I was present for the  entire procedure. I reviewed the stored images and agree with the report as written.    Impression:       Bilateral 12 [place] PCN exchange, with significant encrustation early on the tubes.     Plan:     Plan for check and exchange in 2 months.  If there is concern for tube malpositioning or patency please contact IR     _______________________________________________________________       Electronically signed by: Susan Cueva   Date: 08/29/2022   Time: 14:42       CT Abdomen Pelvis  Without Contrast  Order: [id number]  Status: Final result     Visible to patient: No (inaccessible in Patient Portal)     Next appt: 09/08/2022 at 01:00 PM in Urology (Denise Brooks NP)     0 Result Notes  Details    Reading Physician Reading Date Result Priority   John Mejia Jr., MD  [phone]  [phone] 8/24/2022 STAT   Bro Brambila MD  [phone]  [phone] 8/24/2022      Narrative & Impression  EXAMINATION:  CT ABDOMEN PELVIS WITHOUT CONTRAST     CLINICAL HISTORY:  UTI, recurrent/complicated (Female);     TECHNIQUE:  Axial images of the abdomen and pelvis were acquired without the use of IV contrast.  Coronal and sagittal reconstructions were also obtained     COMPARISON:  IR nephrostomy tube change 05/02/2022     CT abdomen pelvis 06/17/2021     FINDINGS:  Heart: Normal in size. No pericardial effusion.     Lungs: Visualized portions of the lungs are clear without consolidation.  Few scattered bands of subsegmental atelectasis.  4 mm right middle lobe nodule series 2, image 4 similar to December 2020.  Additional 4 mm right pleural base nodule slightly more conspicuous compared to prior.     Liver: Normal in size and contour.  No focal hepatic lesion.     Gallbladder: Surgically absent.     Bile Ducts: No evidence of dilated ducts.     Pancreas: No mass or peripancreatic fat stranding.     Spleen: Unremarkable.     Stomach and duodenum: Unremarkable.     Adrenals: Unremarkable.     Kidneys/Ureters:  Postoperative change of colon conduit urinary diversion with left lower quadrant urostomy.  Bilateral nephrostomy tubes in place.  Kidneys normal in size and location.  Right renal stones measuring 4 mm and 7 mm.  No right hydronephrosis.  New 4 mm stone within the proximal right ureter (axial series 2, image 96).  Mild prominence/postsurgical change of the right ureter, similar to prior.  Moderate left hydronephrosis and wall thickening of the left collecting system/ureter, similar to prior.  Punctate focus of air in the left collecting system (axial series 2, image 73).  No left nephrolithiasis or ureteral stones.     Bladder: Not distended.     Reproductive organs: Uterus surgically absent.     Bowel/Mesentery: Small bowel is normal in caliber with no evidence of obstruction. No evidence of inflammation or wall thickening.  Postoperative change of right hemicolectomy with ileostomy in the right lower quadrant.  Long Charlotte's pouch relatively decompressed with small amount of high density material at its proximal end.  Colon demonstrates no focal wall thickening.     Peritoneum: No intraperitoneal free air or fluid.     Lymph nodes: No retroperitoneal lymphadenopathy.  Scattered para-aortic nodes noted similar.     Vasculature: No aneurysm. No significant calcific atherosclerosis.     Abdominal wall:  No parastomal hernias.  No fluid collections along the course of the nephrostomy tubes.     Bones: No acute fracture. No suspicious osseous lesions.     Impression:     1. Postoperative change of colon conduit urinary diversion with left lower quadrant urostomy and bilateral nephrostomy tubes in place.  2. Right nephrolithiasis and new 4 mm stone within the proximal right ureter.  No right hydronephrosis.  Prominent/postsurgical change of the right ureter, similar to prior.  3. Persistent moderate left hydronephrosis and punctate focus of air as above.  4. Additional findings as above.  5. Right pulmonary micro  nodules.  The 4 mm pleural base nodule is more conspicuous.  Attention on follow-up.     Electronically signed by resident: Bro Brambila  Date:                                            08/24/2022  Time:                                           10:16     Electronically signed by: John Mejia MD  Date:                                            08/24/2022  Time:                                           11:30      Yes

## 2022-08-31 NOTE — PROGRESS NOTES
[facility]  Infectious Disease  Progress Note    Patient Name: Edita Riley  MRN: [id number]  Admission Date: 8/26/2022  Length of Stay: 2 days  Attending Physician: Kiki Culp MD  Primary Care Provider: No primary care provider on file.    Isolation Status: No active isolations  Assessment/Plan:      * Pyelonephritis     57 year old female with HTN, DVT, CKD, cervical cancer s/p pelvic radiation c/b bilateral ureteral strictures and bilateral hydronephrosis requiring urinary diversion 9/2020 managed with bilateral nephrostomy tubes who presents with flank pain.    CT A/P showed right renal stones without hydronephrosis, moderate left hydronephrosis and wall thickening of the left collecting system with punctate focus of air in the left collecting system. No left nephrolithiasis or ureteral stones.    Urology consulted and recommended bilateral nephrostomy exchange with IR which was completed on 8/29. Planning for outpatient stone management.     Urine culture 8/24 positive for Klebsiella pneumoniae. Repeat UA 8/26 positive. Urine cx with multiple organisms. Patient is on Ceftriaxone. Remains afebrile. Leukocytosis resolved. Reports slight symptomatic improvement though still suffering from pain. ID consulted for antibiotic recommendations.    Recommendations  · Continue IV Ceftriaxone while inpatient  · Once patient clinically improves, can transition to oral Ciprofloxacin at discharge.  · EKG reviewed, QTc is 420. Medication list reviewed. No identified interactions.  Patient counseled on proper administration - administer at least 2 hours before or after zinc, aluminum, magnesium,  calcium or iron containing oral products, including antacids, dairy products, vitamins  · Would continue Ciprofloxacin until definitive stone management  · ID will arrange clinic follow up in 7-10 days  · Discussed antibiotic plan with ID staff and Dr. Culp. ID will sign off.        Thank you for the consult.  Please call for any questions.  Megha Joseph PA-C  ID [id number] Spectra: [id number]    Subjective:     Principal Problem:Pyelonephritis    HPI: 57 year old female with HTN, fibromyalgia, DVT (on eliquis), CKD, cervical cancer s/p pelvic radiation c/b bilateral ureteral strictures and bilateral hydronephrosis requiring urinary diversion 9/2020 complicated by bowel perforation now with ileostomy and bilateral nephrostomy tubes c/b recurrent UTIs who presents with flank pain.    Patient was seen in the ED on 8/24 for left-sided flank pain and was diagnosed with left-sided pyelonephritis and discharged on Cefdinir. CT A/P showed right renal stones without hydronephrosis, Moderate left hydronephrosis and wall thickening of the left collecting system with punctate focus of air in the left collecting system. No left nephrolithiasis or ureteral stones. She returned to the ED on 8/26 with right flank pain and was admitted for further evaluation.     Patient states she has been suffering from flank pain for at least 3-4 days now. She reports  subjective fevers at home and nausea/vomiting. Of note, last nephrostomy tube exchange occurred in May. Patient afebrile but with a leukocytosis on admit. Urology consulted and recommended bilateral nephrostomy exchange with IR which was completed on 8/29. Planning for outpatient stone management. Repeat UA positive. Urine cx with multiple organisms. Patient is on Ceftriaxone. Remains afebrile. Leukocytosis resolved. Pain somewhat improved and n/v resolved. ID consulted for antibiotic recommendations.    Interval History:     Patient seen at bedside. Dr. Culp present via [facility]. NAEON. Afebrile. Tolerating antibiotics. Reports 9/10 flank pain, though hasn't gotten pain medication in 24 hours. Patient is worried about stressors at home and uncontrolled pain at home.    Review of Systems   Constitutional:  Positive for activity change, appetite change and fatigue. Negative for chills,  diaphoresis and fever.   HENT: Negative.     Eyes: Negative.    Respiratory:  Negative for cough and shortness of breath.    Cardiovascular:  Negative for chest pain.   Gastrointestinal:  Negative for abdominal pain, constipation and vomiting.   Genitourinary:  Positive for flank pain. Negative for hematuria.   Musculoskeletal:  Positive for back pain.   Neurological:  Negative for speech difficulty and headaches.   Psychiatric/Behavioral:  Negative for agitation and confusion. The patient is not nervous/anxious.    Objective:     Vital Signs (Most Recent):  Temp: 97.4 °F (36.3 °C) (08/31/22 1115)  Pulse: 83 (08/31/22 1115)  Resp: 14 (08/31/22 1124)  BP: (!) 107/58 (08/31/22 1115)  SpO2: 97 % (08/31/22 1115)   Vital Signs (24h Range):  Temp:  [97.4 °F (36.3 °C)-98.4 °F (36.9 °C)] 97.4 °F (36.3 °C)  Pulse:  [76-85] 83  Resp:  [14-18] 14  SpO2:  [95 %-98 %] 97 %  BP: ()/(50-63) 107/58     Weight: 88.5 kg (195 lb)  Body mass index is 28.8 kg/m².    Estimated Creatinine Clearance: 59.8 mL/min (based on SCr of 1.2 mg/dL).    Physical Exam  Vitals reviewed.   Constitutional:       General: She is not in acute distress.     Appearance: She is not ill-appearing, toxic-appearing or diaphoretic.   HENT:      Head: Normocephalic and atraumatic.      Nose: Nose normal. No congestion.      Mouth/Throat:      Pharynx: Oropharynx is clear. No oropharyngeal exudate.   Eyes:      General: No scleral icterus.     Conjunctiva/sclera: Conjunctivae normal.   Cardiovascular:      Rate and Rhythm: Normal rate and regular rhythm.   Pulmonary:      Effort: Pulmonary effort is normal. No respiratory distress.   Abdominal:      General: There is no distension.      Palpations: Abdomen is soft.      Comments: Urostomy  Ileostomy   Genitourinary:     Comments: Bilateral nephrostomy tube in place.  Sites dressed. Clear yellow urine in bags  Musculoskeletal:         General: Tenderness present.   Skin:     General: Skin is warm and dry.       Findings: No rash.   Neurological:      Mental Status: She is alert and oriented to person, place, and time.   Psychiatric:         Mood and Affect: Mood normal.         Behavior: Behavior normal.         Thought Content: Thought content normal.         Judgment: Judgment normal.       Significant Labs:   Recent Lab Results         08/31/22  0445        Albumin 2.5       Alkaline Phosphatase 132       ALT 11       Anion Gap 7       AST 11       Baso # 0.03       Basophil % 0.4       BILIRUBIN TOTAL 0.2  Comment: For infants and newborns, interpretation of results should be based  on gestational age, weight and in agreement with clinical  observations.    Premature Infant recommended reference ranges:  Up to 24 hours.............<8.0 mg/dL  Up to 48 hours............<12.0 mg/dL  3-5 days..................<15.0 mg/dL  6-29 days.................<15.0 mg/dL         BUN 17       Calcium 9.3       Chloride 112       CO2 18       Creatinine 1.2       Differential Method Automated       eGFR 52.1       Eos # 0.3       Eosinophil % 3.7       Glucose 113       Gran # (ANC) 5.0       Gran % 62.1       Hematocrit 36.1       Hemoglobin 11.3       Immature Grans (Abs) 0.03  Comment: Mild elevation in immature granulocytes is non specific and   can be seen in a variety of conditions including stress response,   acute inflammation, trauma and pregnancy. Correlation with other   laboratory and clinical findings is essential.         Immature Granulocytes 0.4       Lymph # 1.7       Lymph % 20.7       Magnesium 1.9       MCH 28.1       MCHC 31.3       MCV 90       Mono # 1.0       Mono % 12.7       MPV 9.8       nRBC 0       Phosphorus 2.8       Platelets 287       Potassium 3.7       PROTEIN TOTAL 6.8       RBC 4.02       RDW 14.2       Sodium 137       WBC 8.05               Significant Imaging:     IR Nephrostogram through Existing access [[id number]] Resulted: 08/29/22 1442   Order Status: Completed Updated: 08/29/22 1440    Narrative:     EXAMINATION:   Genitourinary catheter exchange     Procedural Personnel     Attending physician(s): Susan Cueva     Fellow physician(s): None     Resident physician(s): None     Advanced practice provider(s): None     Pre-procedure diagnosis: Ureteral obstruction     Post-procedure diagnosis: Same     Indication: Continued ureteral obstruction     Complications: No immediate complications.     CLINICAL HISTORY:   Patient with history of bilateral indwelling nephrostomy tubes less exchange 05/02/2022     TECHNIQUE:   PROCEDURE SUMMARY     - Antegrade nephrostogram via the existing access     - Additional procedure(s): None     COMPARISON:   Exchange 05/02/2022 and older     FINDINGS:   Pre-procedure     Consent: Informed consent for the procedure was obtained and time-out was performed prior to the procedure.     Preparation: The site was prepared and draped using maximal sterile barrier technique including cutaneous antisepsis.     Antibiotic administered: Prophylactic dose within 1 hour of procedure start time or 2 hours for vancomycin or fluoroquinolones       Anesthesia/sedation     Level of anesthesia/sedation: Moderate sedation (conscious sedation)     Anesthesia/sedation administered by: Independent trained observer under attending supervision with continuous monitoring of the patient's level of consciousness and physiologic status     Total intra-service sedation time (minutes): 50     Right genitourinary catheter exchange     Local anesthesia was administered.  Initially an Amplatz wire was attempted to be placed through the existing tube in the native kidney, which was unsuccessful and a Glidewire was then used to remove the tube over a wire.  Significant encrustation was noted in the tube.  The new tube was advanced into position.     Collecting system dilation: Persistent moderate dilation     New catheter(s): 12 [place]ian     Final catheter position(s): Pigtail in the renal pelvis      External catheter securement: Non-absorbable suture     Additional findings: None     Left genitourinary catheter exchange     Local anesthesia was administered. A both an Amplatz and glide wire were attempted to be placed through the catheter combo unsuccessful due to the significant incrustation.  Next a 4 Tunisian Kumpe the catheter and Glidewire were advanced adjacent to the indwelling tube common access to the kidney was obtained.  The indwelling tube was removed.  The new tube was advanced into position.     Collecting system dilation: Persistent mild dilation     New catheter(s): 12 [place]ian     Final catheter position(s): Pigtail in the renal pelvis     External catheter securement: Non-absorbable suture     Additional findings: None     Additional genitourinary system intervention     Genitourinary intervention: None     Location of intervention: Not applicable     Device used: Not applicable     Description of intervention: Not applicable     Post-intervention findings: Not applicable     Contrast     Contrast agent: Omnipaque 300     Contrast volume (mL): 5 0     Radiation Dose     Fluoroscopy time ( minutes): 16.1     Reference air kerma ( mGy): 69     Kerma area product ( mGy-cm2): 1653     Additional Details     Additional description of procedure: None     Equipment details: None     Specimens removed: None. A sample was not sent for analysis.     Estimated blood loss (mL): Less than 10     Standardized report: [email or web address]     Attestation     Signer name: Susan Cueva     I attest that I was present for the entire procedure. I reviewed the stored images and agree with the report as written.    Impression:       Bilateral 12 Tunisian PCN exchange, with significant encrustation early on the tubes.     Plan:     Plan for check and exchange in 2 months.  If there is concern for tube malpositioning or patency please contact IR      _______________________________________________________________       Electronically signed by: Susan Cueva   Date: 08/29/2022   Time: 14:42       CT Abdomen Pelvis  Without Contrast  Order: [id number]   Status: Final result      Visible to patient: No (inaccessible in Patient Portal)      Next appt: 09/08/2022 at 01:00 PM in Urology (Denise Brooks NP)      0 Result Notes  Details    Reading Physician Reading Date Result Priority   John Mejia Jr., MD  [phone]  [phone] 8/24/2022 STAT   Bro Brambila MD  [phone]  [phone] 8/24/2022      Narrative & Impression  EXAMINATION:  CT ABDOMEN PELVIS WITHOUT CONTRAST     CLINICAL HISTORY:  UTI, recurrent/complicated (Female);     TECHNIQUE:  Axial images of the abdomen and pelvis were acquired without the use of IV contrast.  Coronal and sagittal reconstructions were also obtained     COMPARISON:  IR nephrostomy tube change 05/02/2022     CT abdomen pelvis 06/17/2021     FINDINGS:  Heart: Normal in size. No pericardial effusion.     Lungs: Visualized portions of the lungs are clear without consolidation.  Few scattered bands of subsegmental atelectasis.  4 mm right middle lobe nodule series 2, image 4 similar to December 2020.  Additional 4 mm right pleural base nodule slightly more conspicuous compared to prior.     Liver: Normal in size and contour.  No focal hepatic lesion.     Gallbladder: Surgically absent.     Bile Ducts: No evidence of dilated ducts.     Pancreas: No mass or peripancreatic fat stranding.     Spleen: Unremarkable.     Stomach and duodenum: Unremarkable.     Adrenals: Unremarkable.     Kidneys/Ureters: Postoperative change of colon conduit urinary diversion with left lower quadrant urostomy.  Bilateral nephrostomy tubes in place.  Kidneys normal in size and location.  Right renal stones measuring 4 mm and 7 mm.  No right hydronephrosis.  New 4 mm stone within the proximal right ureter (axial series 2, image 96).  Mild  prominence/postsurgical change of the right ureter, similar to prior.  Moderate left hydronephrosis and wall thickening of the left collecting system/ureter, similar to prior.  Punctate focus of air in the left collecting system (axial series 2, image 73).  No left nephrolithiasis or ureteral stones.     Bladder: Not distended.     Reproductive organs: Uterus surgically absent.     Bowel/Mesentery: Small bowel is normal in caliber with no evidence of obstruction. No evidence of inflammation or wall thickening.  Postoperative change of right hemicolectomy with ileostomy in the right lower quadrant.  Long Charlotte's pouch relatively decompressed with small amount of high density material at its proximal end.  Colon demonstrates no focal wall thickening.     Peritoneum: No intraperitoneal free air or fluid.     Lymph nodes: No retroperitoneal lymphadenopathy.  Scattered para-aortic nodes noted similar.     Vasculature: No aneurysm. No significant calcific atherosclerosis.     Abdominal wall:  No parastomal hernias.  No fluid collections along the course of the nephrostomy tubes.     Bones: No acute fracture. No suspicious osseous lesions.     Impression:     1. Postoperative change of colon conduit urinary diversion with left lower quadrant urostomy and bilateral nephrostomy tubes in place.  2. Right nephrolithiasis and new 4 mm stone within the proximal right ureter.  No right hydronephrosis.  Prominent/postsurgical change of the right ureter, similar to prior.  3. Persistent moderate left hydronephrosis and punctate focus of air as above.  4. Additional findings as above.  5. Right pulmonary micro nodules.  The 4 mm pleural base nodule is more conspicuous.  Attention on follow-up.     Electronically signed by resident: Bro Brambila  Date:                                            08/24/2022  Time:                                           10:16     Electronically signed by: John Mejia MD  Date:                                             08/24/2022  Time:                                           11:30

## 2022-08-31 NOTE — PLAN OF CARE
Problem: Adult Inpatient Plan of Care  Goal: Plan of Care Review  Outcome: Ongoing, Progressing  Goal: Patient-Specific Goal (Individualized)  Outcome: Ongoing, Progressing  Goal: Absence of Hospital-Acquired Illness or Injury  Outcome: Ongoing, Progressing  Intervention: Identify and Manage Fall Risk  Flowsheets (Taken 8/31/2022 0324)  Safety Promotion/Fall Prevention:   assistive device/personal item within reach   Fall Risk reviewed with patient/family   high risk medications identified   medications reviewed   nonskid shoes/socks when out of bed   side rails raised x 2  Intervention: Prevent Skin Injury  Flowsheets (Taken 8/31/2022 0324)  Body Position:   upper extremity elevated   30 degrees  Skin Protection: adhesive use limited  Intervention: Prevent and Manage VTE (Venous Thromboembolism) Risk  Flowsheets (Taken 8/31/2022 0324)  Activity Management: Rolling - L1  VTE Prevention/Management:   remove, assess skin, and reapply sequential compression device   fluids promoted  Range of Motion: active ROM (range of motion) encouraged  Intervention: Prevent Infection  Flowsheets (Taken 8/31/2022 0324)  Infection Prevention:   cohorting utilized   hand hygiene promoted   single patient room provided   personal protective equipment utilized   environmental surveillance performed   equipment surfaces disinfected   rest/sleep promoted  Goal: Optimal Comfort and Wellbeing  Outcome: Ongoing, Progressing  Intervention: Monitor Pain and Promote Comfort  Flowsheets (Taken 8/31/2022 0324)  Pain Management Interventions:   medication offered   position adjusted  Intervention: Provide Person-Centered Care  Flowsheets (Taken 8/31/2022 0324)  Trust Relationship/Rapport:   care explained   questions answered   choices provided   questions encouraged   emotional support provided   reassurance provided   empathic listening provided   thoughts/feelings acknowledged

## 2022-08-31 NOTE — ASSESSMENT & PLAN NOTE
57 year old female with HTN, DVT, CKD, cervical cancer s/p pelvic radiation c/b bilateral ureteral strictures and bilateral hydronephrosis requiring urinary diversion 9/2020 managed with bilateral nephrostomy tubes who presents with flank pain.    CT A/P showed right renal stones without hydronephrosis, moderate left hydronephrosis and wall thickening of the left collecting system with punctate focus of air in the left collecting system. No left nephrolithiasis or ureteral stones.    Urology consulted and recommended bilateral nephrostomy exchange with IR which was completed on 8/29. Planning for outpatient stone management.     Urine culture 8/24 positive for Klebsiella pneumoniae. Repeat UA 8/26 positive. Urine cx with multiple organisms. Patient is on Ceftriaxone. Remains afebrile. Leukocytosis resolved. Reports slight symptomatic improvement though still suffering from pain. ID consulted for antibiotic recommendations.    Recommendations  · Continue IV Ceftriaxone while inpatient  · Once patient clinically improves, can transition to oral Ciprofloxacin at discharge.  · EKG reviewed, QTc is 420. Medication list reviewed. No identified interactions.  Patient counseled on proper administration - administer at least 2 hours before or after zinc, aluminum, magnesium,  calcium or iron containing oral products, including antacids, dairy products, vitamins  · Would continue Ciprofloxacin until definitive stone management  · ID will arrange clinic follow up in 7-10 days  · Discussed antibiotic plan with ID staff. ID will sign off.

## 2022-09-01 VITALS
BODY MASS INDEX: 28.88 KG/M2 | SYSTOLIC BLOOD PRESSURE: 114 MMHG | RESPIRATION RATE: 16 BRPM | OXYGEN SATURATION: 99 % | DIASTOLIC BLOOD PRESSURE: 57 MMHG | WEIGHT: 195 LBS | HEART RATE: 75 BPM | HEIGHT: 69 IN | TEMPERATURE: 99 F

## 2022-09-01 LAB
ALBUMIN SERPL BCP-MCNC: 2.7 G/DL (ref 3.5–5.2)
ALP SERPL-CCNC: 128 U/L (ref 55–135)
ALT SERPL W/O P-5'-P-CCNC: 14 U/L (ref 10–44)
ANION GAP SERPL CALC-SCNC: 9 MMOL/L (ref 8–16)
AST SERPL-CCNC: 12 U/L (ref 10–40)
BILIRUB SERPL-MCNC: 0.2 MG/DL (ref 0.1–1)
BUN SERPL-MCNC: 12 MG/DL (ref 6–20)
CALCIUM SERPL-MCNC: 9.5 MG/DL (ref 8.7–10.5)
CHLORIDE SERPL-SCNC: 108 MMOL/L (ref 95–110)
CO2 SERPL-SCNC: 20 MMOL/L (ref 23–29)
CREAT SERPL-MCNC: 1.2 MG/DL (ref 0.5–1.4)
EST. GFR  (NO RACE VARIABLE): 52.1 ML/MIN/1.73 M^2
GLUCOSE SERPL-MCNC: 97 MG/DL (ref 70–110)
MAGNESIUM SERPL-MCNC: 1.9 MG/DL (ref 1.6–2.6)
PHOSPHATE SERPL-MCNC: 2.8 MG/DL (ref 2.7–4.5)
POTASSIUM SERPL-SCNC: 3.8 MMOL/L (ref 3.5–5.1)
PROT SERPL-MCNC: 7 G/DL (ref 6–8.4)
SODIUM SERPL-SCNC: 137 MMOL/L (ref 136–145)

## 2022-09-01 PROCEDURE — 25000003 PHARM REV CODE 250: Performed by: INTERNAL MEDICINE

## 2022-09-01 PROCEDURE — 84100 ASSAY OF PHOSPHORUS: CPT | Performed by: INTERNAL MEDICINE

## 2022-09-01 PROCEDURE — 36415 COLL VENOUS BLD VENIPUNCTURE: CPT | Performed by: INTERNAL MEDICINE

## 2022-09-01 PROCEDURE — 83735 ASSAY OF MAGNESIUM: CPT | Performed by: INTERNAL MEDICINE

## 2022-09-01 PROCEDURE — 99239 HOSP IP/OBS DSCHRG MGMT >30: CPT | Mod: 95,,, | Performed by: INTERNAL MEDICINE

## 2022-09-01 PROCEDURE — 80053 COMPREHEN METABOLIC PANEL: CPT | Performed by: INTERNAL MEDICINE

## 2022-09-01 PROCEDURE — 25000003 PHARM REV CODE 250: Performed by: STUDENT IN AN ORGANIZED HEALTH CARE EDUCATION/TRAINING PROGRAM

## 2022-09-01 PROCEDURE — 99239 PR HOSPITAL DISCHARGE DAY,>30 MIN: ICD-10-PCS | Mod: 95,,, | Performed by: INTERNAL MEDICINE

## 2022-09-01 RX ORDER — SUCRALFATE 1 G/1
1 TABLET ORAL
Qty: 120 TABLET | Refills: 2 | Status: ON HOLD | OUTPATIENT
Start: 2022-09-01 | End: 2023-04-25 | Stop reason: HOSPADM

## 2022-09-01 RX ORDER — ACETAMINOPHEN 325 MG/1
650 TABLET ORAL 3 TIMES DAILY
Refills: 0 | COMMUNITY
Start: 2022-09-01 | End: 2023-01-04

## 2022-09-01 RX ORDER — CIPROFLOXACIN 500 MG/1
500 TABLET ORAL EVERY 12 HOURS
Qty: 28 TABLET | Refills: 0 | Status: SHIPPED | OUTPATIENT
Start: 2022-09-01 | End: 2022-09-16

## 2022-09-01 RX ORDER — CIPROFLOXACIN 500 MG/1
500 TABLET ORAL EVERY 12 HOURS
Status: DISCONTINUED | OUTPATIENT
Start: 2022-09-01 | End: 2022-09-01 | Stop reason: HOSPADM

## 2022-09-01 RX ORDER — GABAPENTIN 100 MG/1
200 CAPSULE ORAL NIGHTLY
Qty: 90 CAPSULE | Refills: 3 | Status: ON HOLD
Start: 2022-09-01 | End: 2023-11-05

## 2022-09-01 RX ORDER — TRAMADOL HYDROCHLORIDE 50 MG/1
50 TABLET ORAL EVERY 12 HOURS PRN
Qty: 10 TABLET | Refills: 0 | Status: SHIPPED | OUTPATIENT
Start: 2022-09-01 | End: 2022-09-06

## 2022-09-01 RX ORDER — ACETAMINOPHEN 325 MG/1
650 TABLET ORAL EVERY 6 HOURS PRN
Refills: 0
Start: 2022-09-01 | End: 2022-09-01 | Stop reason: SDUPTHER

## 2022-09-01 RX ADMIN — SODIUM BICARBONATE 650 MG TABLET 650 MG: at 09:09

## 2022-09-01 RX ADMIN — OXYCODONE 5 MG: 5 TABLET ORAL at 05:09

## 2022-09-01 RX ADMIN — ACETAMINOPHEN 650 MG: 325 TABLET ORAL at 09:09

## 2022-09-01 RX ADMIN — SUCRALFATE 1 G: 1 TABLET ORAL at 06:09

## 2022-09-01 RX ADMIN — OXYCODONE 5 MG: 5 TABLET ORAL at 02:09

## 2022-09-01 RX ADMIN — CIPROFLOXACIN 500 MG: 500 TABLET, FILM COATED ORAL at 11:09

## 2022-09-01 RX ADMIN — SUCRALFATE 1 G: 1 TABLET ORAL at 11:09

## 2022-09-01 NOTE — ASSESSMENT & PLAN NOTE
- patient presenting with a non-anion gap metabolic acidosis with bicarbonate of 12 likely secondary to acute on chronic kidney injury    - continue to monitor, addressing acute kidney injury as noted  - continue home Na bicarb

## 2022-09-01 NOTE — DISCHARGE SUMMARY
[facility] Medicine  Telemedicine Discharge Summary      Patient Name: Edita Riley  MRN: [id number]  Patient Class: IP- Inpatient  Admission Date: 8/26/2022  Hospital Length of Stay: 3 days  Discharge Date and Time:  09/01/2022 1:10 PM  Attending Physician: Danuta Barboza MD   Discharging Provider: Danuta Barboza MD  Primary Care Provider: No primary care provider on file.      HPI:   Edita Riley is a 57-year-old woman with a past medical history of HTN, cervical cancer s/p surgery and pelvic radiation, open transverse colon conduit urinary diversion on 9/11/2020 complicated by bowel perforation s/p take back for hemicolectomy and ileostomy creation (9/17/20). She was seen 8/24 for left-sided flank pain and was diagnosed with left-sided pyelonephritis and discharged on Cefdinir, incidental right-sided 4mm and 7mm uretal stones noted at that time. Interim urine cultures grew pan-sensitive Klebsiella pneumoniae. She is here today with right-sided flank pain for two days likey from the stones. The pain is a constant stabbing pain that is worse with movement. She has tried tylenol for the pain without relief. She reports her left-sided pain has improved. She denies fever and chills. Both nephrostomy tubes are draining appropriately, urine is non-bloody and green, stoma looks healthy with green liquid bowel contents. She denies f/v, n/v. Nephrostomy tubes last exchanged May 2022, previously exchanged every 3 months.     Per further discussion on interview with the patient, she reports compliance with her cefdinir. She noted that the right flank pain had eased since the administration of IV narcotic pain medication. She denies any discharge or erythema surrounding the site of her nephrostomy tube. She reports low-grade temperatures and chills at home. She denies any abdominal pain, nausea or vomiting. She denies any hematuria in her nephrostomy bags. She is amenable to  nephrostomy tube exchange in the morning with Interventional Radiology.     In the emergency department, the patient was given acetaminophen and a dose of IV morphine.      * No surgery found *      Hospital Course:   Patient admitted to hospital medicine. Urology consulted. IR consulted.      See Problems listed below for additional details of this hospital stay.      Goals of Care Treatment Preferences:  Code Status: Full Code      Consults:   Consults (From admission, onward)          Status Ordering Provider     Inpatient consult to Infectious Diseases  Once        Provider:  (Not yet assigned)    Completed PB BETANCUR     Inpatient virtual consult to Hospital Medicine  Once        Provider:  (Not yet assigned)    Completed ANSON WILDE     Inpatient consult to Interventional Radiology  Once        Provider:  (Not yet assigned)    Completed QUINTON SALAZAR     Inpatient consult to Interventional Radiology  Once        Provider:  (Not yet assigned)    Completed SAUL AKBAR     Inpatient consult to Urology  Once        Provider:  (Not yet assigned)    Completed SAUL AKBAR            * Pyelonephritis  - recently presented with pyelonephritis of the left kidney and was being treated with oral cefdinir, but presents this evening with right-sided flank pain with UA with >100 WBCs concerning for infection with known nephrolithiasis of the right kidney  - Urine culture from recent ED visit growing >100,000 CFUs of pan-sensitive Klebsiella pneumoniae  - IV ceftriaxone 1 g q24h given sensitivities of Klebsiella pneumoniae  - IV morphine PRN pain  - IV ondansetron PRN nausea/vomiting  - Urology following patient, recommend bilateral nephrostomy exchange with IR - IR completed on 8/29  - ID has followed patient in the past; consulted for recommendations for antibiotic choice and length therapy given complicated UTI  - ID recs: continue Rocephin for now with Cipro as outpatient until stones  addressed  - Follow up with urology as outpatient for stone management - appt scheduled and case management arranging transportation    Nephrostomy status  Last exchanged this admit on 8/29 by IR    Metabolic acidosis  - patient presenting with a non-anion gap metabolic acidosis with bicarbonate of 12 likely secondary to acute on chronic kidney injury    - continue to monitor, addressing acute kidney injury as noted  - continue home Na bicarb    Presence of urostomy  Urine present to ostomy despite nephrostomies in place    Ileostomy in place  Emptying and bag change prn with nursing assistance    ODESSA (acute kidney injury)  Estimated Creatinine Clearance: 59.8 mL/min (based on SCr of 1.2 mg/dL).  Patient with acute kidney injury likely due to post-obstructive d/t obstruction secondary to cervical cancer and fibrosis ODESSA is currently improving. Labs reviewed- Renal function/electrolytes with Estimated Creatinine Clearance: 59.8 mL/min (based on SCr of 1.2 mg/dL).   - gave 1 liter of lactated ringer's solution on admission  - Monitor urine output and serial BMP and adjust therapy as needed  - Avoid nephrotoxins and renally dose meds for GFR listed above  - resolved    Cervical cancer  - patient has a history of cervical cancer and is s/p surgical resection with pelvic radiation with a history of an open transverse colon conduit and urinary diversion complicated by bowel perforation and subsequently received hemicolectomy and ileostomy creation  - Wound Care consult placed for ostomy care  - Needs OP follow up with Gyn Onc    Final Active Diagnoses:    Diagnosis Date Noted POA    PRINCIPAL PROBLEM:  Pyelonephritis [N12] 06/11/2020 Yes     Chronic    Nephrostomy status [Z93.6]  Not Applicable     Chronic    Metabolic acidosis [E87.2] 04/17/2021 Yes    Presence of urostomy [Z93.6] 02/08/2021 Not Applicable    Ileostomy in place [Z93.2]  Not Applicable     Chronic    ODESSA (acute kidney injury) [N17.9] 03/21/2020 Yes     Cervical cancer [C53.9] 05/04/2015 Yes      Problems Resolved During this Admission:       Discharged Condition: stable    Disposition: Home or Self Care    Follow Up:   Follow-up Information       Denise Brooks NP Follow up.    Specialty: Urology  Why: Urology follow-up scheduled for 9/8/22 @ 1PM  Contact information:  [address] [address]  [phone]               [facility] GYNECOLOGICAL ONCOLOGY. Schedule an appointment as soon as possible for a visit in 1 week(s).    Specialty: Gynecologic Oncology  Why: To establish care  Contact information:  [address]  [facility] [address]  [phone]                         Future Appointments   Date Time Provider Department Center   9/8/2022  1:00 PM Denise Brooks NP [facility] UROLGY [facility]   9/9/2022 10:30 AM Megha Joseph PA-C [facility] ID [address]       Patient Instructions:      Ambulatory referral/consult to Urology   Standing Status: Future   Referral Priority: Urgent Referral Type: Consultation   Referral Reason: Specialty Services Required   Requested Specialty: Urology   Number of Visits Requested: 1     Ambulatory referral/consult to [facility]   Standing Status: Future   Referral Priority: Routine Referral Type: Consultation   Referral Reason: Specialty Services Required   Number of Visits Requested: 1     Ambulatory referral/consult to Gynecologic Oncology   Standing Status: Future   Referral Priority: Urgent Referral Type: Consultation   Referral Reason: Specialty Services Required   Requested Specialty: Gynecologic Oncology   Number of Visits Requested: 1     Diet Adult Regular     Notify your health care provider if you experience any of the following:  temperature >100.4     Notify your health care provider if you experience any of the following:  persistent nausea and vomiting or diarrhea     Notify your health care provider if you experience any of the following:   difficulty breathing or increased cough     Notify your health care provider if you experience any of the following:  severe persistent headache     Notify your health care provider if you experience any of the following:  persistent dizziness, light-headedness, or visual disturbances     Notify your health care provider if you experience any of the following:  increased confusion or weakness     Activity as tolerated     Ambulatory Request for Ready Responder Services   Standing Status: Future Standing Exp. Date: 08/31/23     Order Specific Question Answer Comments   Program referred to: COVID-19 Vaccine        Significant Diagnostic Studies:   Recent Labs   Lab 08/28/22  0411 08/29/22  0559 08/31/22 0445   WBC 7.12 7.84 8.05   HGB 12.0 11.8* 11.3*   HCT 39.0 37.8 36.1*    279 287     Recent Labs   Lab 08/26/22  1810 08/31/22 0445   GRAN 64.2  5.8 62.1  5.0   LYMPH 18.5  1.7 20.7  1.7   MONO 15.2*  1.4* 12.7  1.0   EOS 0.2 0.3     Recent Labs   Lab 08/30/22 0411 08/31/22 0445 09/01/22  0252    137 137   K 4.3 3.7 3.8   * 112* 108   CO2 17* 18* 20*   BUN 18 17 12   CREATININE 1.3 1.2 1.2    113* 97   CALCIUM 9.6 9.3 9.5   ALBUMIN 2.7* 2.5* 2.7*   MG 1.9 1.9 1.9   PHOS 3.6 2.8 2.8     Recent Labs   Lab 08/29/22  0854 08/30/22 0411 08/31/22 0445 09/01/22  0252   ALKPHOS  --  138* 132 128   ALT  --  15 11 14   AST  --  13 11 12   PROT  --  7.2 6.8 7.0   BILITOT  --  0.2 0.2 0.2   INR 0.9  --   --   --      Recent Labs   Lab 08/26/22  2014   LACTATE 0.9     SARS-CoV2 (COVID-19) Qualitative PCR (no units)   Date Value   09/08/2020 Not Detected   07/25/2020 Not Detected   04/25/2020 Negative     SARS-CoV-2 RNA, Amplification, Qual (no units)   Date Value   08/26/2022 Negative   04/20/2021 Negative   04/20/2021 Negative   11/16/2020 Negative   09/17/2020 Negative   08/12/2020 Negative   08/06/2020 Negative   08/02/2020 Negative     POC Rapid COVID (no units)   Date Value   02/12/2022  Negative   09/13/2021 Negative   08/30/2021 Negative   06/18/2021 Negative   04/16/2021 Negative   02/03/2021 Negative   01/15/2021 Negative   01/03/2021 Negative   12/08/2020 Negative     Microbiology Results (last 7 days)       Procedure Component Value Units Date/Time    Urine culture [[id number]] Collected: 08/26/22 1943    Order Status: Completed Specimen: Urine Updated: 08/28/22 0002     Urine Culture, Routine Multiple organisms isolated. None in predominance.  Repeat if      clinically necessary.    Narrative:      Specimen Source->Urine          ECG Results              EKG 12-lead (Final result)  Result time 08/27/22 14:23:35      Final result by Interface, Lab In [facility] (08/27/22 14:23:35)                   Narrative:    Test Reason : E87.5,    Vent. Rate : 082 BPM     Atrial Rate : 082 BPM     P-R Int : 132 ms          QRS Dur : 086 ms      QT Int : 372 ms       P-R-T Axes : 080 091 085 degrees     QTc Int : 434 ms    Normal sinus rhythm  Rightward axis  Borderline Abnormal ECG  When compared with ECG of 13-SEP-2021 16:03,  No significant change was found  Confirmed by CHINO BACK MD ([id number]) on 8/27/2022 2:23:26 PM    Referred By: [email or web address]   SELF           Confirmed By:CHINO BACK MD                                    Results for orders placed during the hospital encounter of 04/16/21    Echo Color Flow Doppler? Yes    Interpretation Summary  · The left ventricle is normal in size with normal systolic function.  · The estimated ejection fraction is 65%.  · Normal left ventricular diastolic function.  · Normal right ventricular size with normal right ventricular systolic function.  · Normal central venous pressure (3 mmHg).      IR Nephrostogram through Existing access  Narrative: EXAMINATION:  Genitourinary catheter exchange    Procedural Personnel    Attending physician(s): Susan Cueva    Fellow physician(s): None    Resident physician(s): None    Advanced practice provider(s):  None    Pre-procedure diagnosis: Ureteral obstruction    Post-procedure diagnosis: Same    Indication: Continued ureteral obstruction    Complications: No immediate complications.    CLINICAL HISTORY:  Patient with history of bilateral indwelling nephrostomy tubes less exchange 05/02/2022    TECHNIQUE:  PROCEDURE SUMMARY    - Antegrade nephrostogram via the existing access    - Additional procedure(s): None    COMPARISON:  Exchange 05/02/2022 and older    FINDINGS:  Pre-procedure    Consent: Informed consent for the procedure was obtained and time-out was performed prior to the procedure.    Preparation: The site was prepared and draped using maximal sterile barrier technique including cutaneous antisepsis.    Antibiotic administered: Prophylactic dose within 1 hour of procedure start time or 2 hours for vancomycin or fluoroquinolones    Anesthesia/sedation    Level of anesthesia/sedation: Moderate sedation (conscious sedation)    Anesthesia/sedation administered by: Independent trained observer under attending supervision with continuous monitoring of the patient's level of consciousness and physiologic status    Total intra-service sedation time (minutes): 50    Right genitourinary catheter exchange    Local anesthesia was administered.  Initially an Amplatz wire was attempted to be placed through the existing tube in the native kidney, which was unsuccessful and a Glidewire was then used to remove the tube over a wire.  Significant encrustation was noted in the tube.  The new tube was advanced into position.    Collecting system dilation: Persistent moderate dilation    New catheter(s): 12 [place]an    Final catheter position(s): Pigtail in the renal pelvis    External catheter securement: Non-absorbable suture    Additional findings: None    Left genitourinary catheter exchange    Local anesthesia was administered. A both an Amplatz and glide wire were attempted to be placed through the catheter combo unsuccessful  due to the significant incrustation.  Next a 4 Guatemalan Kumpe the catheter and Glidewire were advanced adjacent to the indwelling tube common access to the kidney was obtained.  The indwelling tube was removed.  The new tube was advanced into position.    Collecting system dilation: Persistent mild dilation    New catheter(s): 12 [place]alan    Final catheter position(s): Pigtail in the renal pelvis    External catheter securement: Non-absorbable suture    Additional findings: None    Additional genitourinary system intervention    Genitourinary intervention: None    Location of intervention: Not applicable    Device used: Not applicable    Description of intervention: Not applicable    Post-intervention findings: Not applicable    Contrast    Contrast agent: Omnipaque 300    Contrast volume (mL): 5 0    Radiation Dose    Fluoroscopy time ( minutes): 16.1    Reference air kerma ( mGy): 69    Kerma area product ( mGy-cm2): 1653    Additional Details    Additional description of procedure: None    Equipment details: None    Specimens removed: None. A sample was not sent for analysis.    Estimated blood loss (mL): Less than 10    Standardized report: [email or web address]    Attestation    Signer name: Susan Cueva    I attest that I was present for the entire procedure. I reviewed the stored images and agree with the report as written.  Impression: Bilateral 12 Guatemalan PCN exchange, with significant encrustation early on the tubes.    Plan:    Plan for check and exchange in 2 months.  If there is concern for tube malpositioning or patency please contact IR    _______________________________________________________________    Electronically signed by: Susan Cueva  Date:    08/29/2022  Time:    14:42       Medications:  Reconciled Home Medications:      Medication List        START taking these medications      acetaminophen 325 MG tablet  Commonly known as: TYLENOL  Take 2 tablets (650 mg total) by mouth 3 (three)  times daily.     ciprofloxacin HCl 500 MG tablet  Commonly known as: CIPRO  Take 1 tablet (500 mg total) by mouth every 12 (twelve) hours. for 14 days     sucralfate 1 gram tablet  Commonly known as: CARAFATE  Take 1 tablet (1 g total) by mouth 4 (four) times daily before meals and nightly.     traMADoL 50 mg tablet  Commonly known as: ULTRAM  Take 1 tablet (50 mg total) by mouth every 12 (twelve) hours as needed for Pain.            CHANGE how you take these medications      gabapentin 100 MG capsule  Commonly known as: NEURONTIN  Take 2 capsules (200 mg total) by mouth every evening.  What changed: how much to take            CONTINUE taking these medications      melatonin 3 mg tablet  Commonly known as: MELATIN  Take 2 tablets (6 mg total) by mouth nightly as needed for Insomnia.     mirtazapine 15 MG disintegrating tablet  Commonly known as: REMERON SOL-TAB  Dissolve 1 tablet (15 mg total) by mouth nightly.     sodium bicarbonate 650 MG tablet  Take 2 tablets (1,300 mg total) by mouth 2 (two) times daily.     VITAMIN D2 50,000 unit Cap  Generic drug: ergocalciferol  Take 1 capsule (50,000 Units total) by mouth every 7 days.            STOP taking these medications      cefdinir 300 MG capsule  Commonly known as: OMNICEF     pantoprazole 40 MG tablet  Commonly known as: PROTONIX              Indwelling Lines/Drains at time of discharge:   Lines/Drains/Airways       Drain  Duration           Urostomy 09/11/20 0000 ileal conduit  days         Ileostomy 09/18/20  days         Nephrostomy 08/29/22 1304 Left 12 Fr. 3 days         Nephrostomy 08/29/22 1306 Right 12 Fr. 3 days        Time spent on the discharge of patient: 45 minutes  This service was provided by Virtual Visit.   Patient was seen and examined on the date of discharge.  Additional time was spent speaking with consultants and case management, reviewing records, and/or discussing the plan of care with patient/family.  The patient location  is: [address]  Admitted 8/26/2022  6:31 PM  Present with the patient at the time of the telemed/virtual assessment: Telepresenter    Patient was transferred to [facility] on:  08/29/2022  This document was prepared by chart review and may not directly reflect my personal knowledge of the patient's case, clinical course, or significant events during the hospital stay.    The attending portion of this evaluation, treatment, and documentation was performed per Danuta Barboza MD via Telemedicine AudioVisual using the secure [employer] software platform with 2 way audio/video. The provider was located off-site and the patient is located in the hospital. The aforementioned video software was utilized to document the relevant history and physical exam    Danuta Barboza MD  Department of [facility] Medicine  [facility]

## 2022-09-01 NOTE — ASSESSMENT & PLAN NOTE
Estimated Creatinine Clearance: 59.8 mL/min (based on SCr of 1.2 mg/dL).  Patient with acute kidney injury likely due to post-obstructive d/t obstruction secondary to cervical cancer and fibrosis ODESSA is currently improving. Labs reviewed- Renal function/electrolytes with Estimated Creatinine Clearance: 59.8 mL/min (based on SCr of 1.2 mg/dL).   - gave 1 liter of lactated ringer's solution on admission  - Monitor urine output and serial BMP and adjust therapy as needed  - Avoid nephrotoxins and renally dose meds for GFR listed above  - resolved

## 2022-09-01 NOTE — ASSESSMENT & PLAN NOTE
Estimated Creatinine Clearance: 59.8 mL/min (based on SCr of 1.2 mg/dL).  Patient with acute kidney injury likely due to post-obstructive d/t obstruction secondary to cervical cancer and fibrosis ODESSA is currently worsening. Labs reviewed- Renal function/electrolytes with Estimated Creatinine Clearance: 59.8 mL/min (based on SCr of 1.2 mg/dL).   - gave 1 liter of lactated ringer's solution  - Monitor urine output and serial BMP and adjust therapy as needed  - Avoid nephrotoxins and renally dose meds for GFR listed above  -imiproving

## 2022-09-01 NOTE — ASSESSMENT & PLAN NOTE
- recently presented with pyelonephritis of the left kidney and was being treated with oral cefdinir, but presents this evening with right-sided flank pain with UA with >100 WBCs concerning for infection with known nephrolithiasis of the right kidney  - Urine culture from recent ED visit growing >100,000 CFUs of pan-sensitive Klebsiella pneumoniae  - start IV ceftriaxone 1 g q24h given sensitivities of Klebsiella pneumoniae but will f/u urine culture  - IV morphine PRN pain  - IV ondansetron PRN nausea/vomiting  - Urology following patient, recommend bilateral nephrostomy exchange with IR - IR completed on 8/29  - ID has follow patient in the past; consulted for recommendations for antibiotic choice and length therapy given complicated UTI  - ID recs: continue rocephin for now with cipro as outpatient until stones addressed  - patient will need to f/u with urology as outpatient for stone management - appt scheduled and case management arranging transportation

## 2022-09-01 NOTE — ASSESSMENT & PLAN NOTE
- patient has a history of cervical cancer and is s/p surgical resection with pelvic radiation with a history of an open transverse colon conduit and urinary diversion complicated by bowel perforation and subsequently received hemicolectomy and ileostomy creation  - Wound Care consult placed for ostomy care  - Needs OP follow up with Gyn Onc

## 2022-09-01 NOTE — PROGRESS NOTES
[facility] Medicine  Telemedicine Progress Note    Patient Name: Edita Riley  MRN: [id number]  Patient Class: IP- Inpatient   Admission Date: 8/26/2022  Length of Stay: 2 days  Attending Physician: Kiki Culp MD  Primary Care Provider: No primary care provider on file.          Subjective:     Principal Problem:Pyelonephritis        HPI:  Edita Riley is a 57-year-old woman with a past medical history of HTN, cervical cancer s/p surgery and pelvic radiation, open transverse colon conduit urinary diversion on 9/11/2020 complicated by bowel perforation s/p take back for hemicolectomy and ileostomy creation (9/17/20). She was seen 8/24 for left-sided flank pain and was diagnosed with left-sided pyelonephritis and discharged on Cefdinir, incidental right-sided 4mm and 7mm uretal stones noted at that time. Interim urine cultures grew pan-sensitive Klebsiella pneumoniae. She is here today with right-sided flank pain for two days likey from the stones. The pain is a constant stabbing pain that is worse with movement. She has tried tylenol for the pain without relief. She reports her left-sided pain has improved. She denies fever and chills. Both nephrostomy tubes are draining appropriately, urine is non-bloody and green, stoma looks healthy with green liquid bowel contents. She denies f/v, n/v. Nephrostomy tubes last exchanged May 2022, previously exchanged every 3 months.     Per further discussion on interview with the patient, she reports compliance with her cefdinir. She noted that the right flank pain had eased since the administration of IV narcotic pain medication. She denies any discharge or erythema surrounding the site of her nephrostomy tube. She reports low-grade temperatures and chills at home. She denies any abdominal pain, nausea or vomiting. She denies any hematuria in her nephrostomy bags. She is amenable to nephrostomy tube exchange in the morning with  Interventional Radiology.     In the emergency department, the patient was given acetaminophen and a dose of IV morphine.      Overview/Hospital Course:  Patient admitted to hospital medicine. Urology consulted. IR consulted.         This encounter was provided through telemedicine.  Patient was transferred to the telemedicine service on:  08/29/2022   The patient location is: 505/505 A admitted 8/26/2022  6:31 PM.  Present with the patient at the time of the telemed/virtual assessment: Telepresenter    Interval History/Overnight Events:     Complains of 9/10 pain to bilateral flanks but has not taken any pain meds in the past 24 hours - she expressed stress about her home situation with she and her  being chronically ill and only having her daughter to assist; she also has difficulty getting to appointments as her  has numerous appts as well  -case management to explore resources for home    Review of Systems   Constitutional:  Positive for activity change, appetite change and fatigue. Negative for fever.   Respiratory:  Negative for cough and shortness of breath.    Gastrointestinal:  Negative for diarrhea and vomiting.   Genitourinary:  Positive for flank pain.      Inpatient Medications:  Scheduled Meds:   cefTRIAXone (ROCEPHIN) IVPB  1 g Intravenous Q24H    gabapentin  200 mg Oral QHS    sodium bicarbonate  650 mg Oral BID    sucralfate  1 g Oral QID (AC & HS)     Continuous Infusions:  PRN Meds:.acetaminophen, cefTRIAXone (ROCEPHIN) IVPB, glucose, melatonin, midazolam, ondansetron, oxyCODONE, sodium chloride 0.9%, sodium chloride 0.9%      Objective:     Temp:  [97.7 °F (36.5 °C)-98.4 °F (36.9 °C)] 98.2 °F (36.8 °C)  Pulse:  [74-85] 85  Resp:  [16-20] 18  SpO2:  [95 %-99 %] 96 %  BP: ()/(50-63) 129/63      Intake/Output Summary (Last 24 hours) at 8/31/2022 1109  Last data filed at 8/31/2022 0500  Gross per 24 hour   Intake 240 ml   Output 1135 ml   Net -895 ml          Body mass index  is 28.8 kg/m².    Physical Exam  Vitals and nursing note reviewed.   Constitutional:       General: She is not in acute distress.     Appearance: She is ill-appearing.   HENT:      Head: Normocephalic and atraumatic.   Eyes:      General: No scleral icterus.        Right eye: No discharge.         Left eye: No discharge.      Extraocular Movements: Extraocular movements intact.   Cardiovascular:      Rate and Rhythm: Normal rate.   Pulmonary:      Effort: Pulmonary effort is normal. No tachypnea or respiratory distress.   Neurological:      General: No focal deficit present.      Mental Status: She is alert and oriented to person, place, and time.      Cranial Nerves: No cranial nerve deficit.      Motor: No weakness.   Psychiatric:         Mood and Affect: Mood and affect normal.         Speech: Speech normal.         Behavior: Behavior is cooperative.         Thought Content: Thought content normal.        Labs:  Recent Results (from the past 24 hour(s))   CBC Auto Differential    Collection Time: 08/31/22  4:45 AM   Result Value Ref Range    WBC 8.05 3.90 - 12.70 K/uL    RBC 4.02 4.00 - 5.40 M/uL    Hemoglobin 11.3 (L) 12.0 - 16.0 g/dL    Hematocrit 36.1 (L) 37.0 - 48.5 %    MCV 90 82 - 98 fL    MCH 28.1 27.0 - 31.0 pg    MCHC 31.3 (L) 32.0 - 36.0 g/dL    RDW 14.2 11.5 - 14.5 %    Platelets 287 150 - 450 K/uL    MPV 9.8 9.2 - 12.9 fL    Immature Granulocytes 0.4 0.0 - 0.5 %    Gran # (ANC) 5.0 1.8 - 7.7 K/uL    Immature Grans (Abs) 0.03 0.00 - 0.04 K/uL    Lymph # 1.7 1.0 - 4.8 K/uL    Mono # 1.0 0.3 - 1.0 K/uL    Eos # 0.3 0.0 - 0.5 K/uL    Baso # 0.03 0.00 - 0.20 K/uL    nRBC 0 0 /100 WBC    Gran % 62.1 38.0 - 73.0 %    Lymph % 20.7 18.0 - 48.0 %    Mono % 12.7 4.0 - 15.0 %    Eosinophil % 3.7 0.0 - 8.0 %    Basophil % 0.4 0.0 - 1.9 %    Differential Method Automated    Comprehensive Metabolic Panel    Collection Time: 08/31/22  4:45 AM   Result Value Ref Range    Sodium 137 136 - 145 mmol/L    Potassium 3.7  3.5 - 5.1 mmol/L    Chloride 112 (H) 95 - 110 mmol/L    CO2 18 (L) 23 - 29 mmol/L    Glucose 113 (H) 70 - 110 mg/dL    BUN 17 6 - 20 mg/dL    Creatinine 1.2 0.5 - 1.4 mg/dL    Calcium 9.3 8.7 - 10.5 mg/dL    Total Protein 6.8 6.0 - 8.4 g/dL    Albumin 2.5 (L) 3.5 - 5.2 g/dL    Total Bilirubin 0.2 0.1 - 1.0 mg/dL    Alkaline Phosphatase 132 55 - 135 U/L    AST 11 10 - 40 U/L    ALT 11 10 - 44 U/L    Anion Gap 7 (L) 8 - 16 mmol/L    eGFR 52.1 (A) >60 mL/min/1.73 m^2   Magnesium    Collection Time: 08/31/22  4:45 AM   Result Value Ref Range    Magnesium 1.9 1.6 - 2.6 mg/dL   Phosphorus    Collection Time: 08/31/22  4:45 AM   Result Value Ref Range    Phosphorus 2.8 2.7 - 4.5 mg/dL        Lab Results   Component Value Date    [id number] Negative 08/26/2022       Recent Labs   Lab 08/26/22  1810 08/26/22 2019 08/28/22 0411 08/29/22  0559 08/31/22  0445   WBC 9.05   < > 7.12 7.84 8.05   LYMPH 18.5  1.7  --   --   --  20.7  1.7   HGB 12.5   < > 12.0 11.8* 11.3*   HCT 39.5   < > 39.0 37.8 36.1*      < > 245 279 287    < > = values in this interval not displayed.       Recent Labs   Lab 08/29/22  0559 08/30/22 0411 08/31/22  0445    139 137   K 4.1 4.3 3.7   * 112* 112*   CO2 16* 17* 18*   BUN 21* 18 17   CREATININE 1.4 1.3 1.2   GLU 96 100 113*   CALCIUM 9.7 9.6 9.3   MG 2.0 1.9 1.9   PHOS  --  3.6 2.8       Recent Labs   Lab 08/29/22  0559 08/29/22  0854 08/30/22  0411 08/31/22  0445   ALKPHOS 156*  --  138* 132   ALT 15  --  15 11   AST 17  --  13 11   ALBUMIN 2.8*  --  2.7* 2.5*   PROT 7.5  --  7.2 6.8   BILITOT 0.2  --  0.2 0.2   INR  --  0.9  --   --           No results for input(s): DDIMER, FERRITIN, CRP, LDH, BNP, TROPONINI, CPK in the last 72 hours.    Invalid input(s): PROCALCITONIN    All labs within the last 24 hours were reviewed.     Microbiology:  Microbiology Results (last 7 days)       Procedure Component Value Units Date/Time    Urine culture [[id number]] Collected: 08/26/22 1943     Order Status: Completed Specimen: Urine Updated: 08/28/22 0002     Urine Culture, Routine Multiple organisms isolated. None in predominance.  Repeat if      clinically necessary.    Narrative:      Specimen Source->Urine              Imaging  ECG Results              EKG 12-lead (Final result)  Result time 08/27/22 14:23:35      Final result by Interface, Lab In [facility] (08/27/22 14:23:35)                   Narrative:    Test Reason : E87.5,    Vent. Rate : 082 BPM     Atrial Rate : 082 BPM     P-R Int : 132 ms          QRS Dur : 086 ms      QT Int : 372 ms       P-R-T Axes : 080 091 085 degrees     QTc Int : 434 ms    Normal sinus rhythm  Rightward axis  Borderline Abnormal ECG  When compared with ECG of 13-SEP-2021 16:03,  No significant change was found  Confirmed by CHINO BACK MD ([id number]) on 8/27/2022 2:23:26 PM    Referred By: [email or web address]   SELF           Confirmed By:CHINO BACK MD                                    Results for orders placed during the hospital encounter of 04/16/21    Echo Color Flow Doppler? Yes    Interpretation Summary  · The left ventricle is normal in size with normal systolic function.  · The estimated ejection fraction is 65%.  · Normal left ventricular diastolic function.  · Normal right ventricular size with normal right ventricular systolic function.  · Normal central venous pressure (3 mmHg).      IR Nephrostogram through Existing access  Narrative: EXAMINATION:  Genitourinary catheter exchange    Procedural Personnel    Attending physician(s): Susan Cueva    Fellow physician(s): None    Resident physician(s): None    Advanced practice provider(s): None    Pre-procedure diagnosis: Ureteral obstruction    Post-procedure diagnosis: Same    Indication: Continued ureteral obstruction    Complications: No immediate complications.    CLINICAL HISTORY:  Patient with history of bilateral indwelling nephrostomy tubes less exchange 05/02/2022    TECHNIQUE:  PROCEDURE  SUMMARY    - Antegrade nephrostogram via the existing access    - Additional procedure(s): None    COMPARISON:  Exchange 05/02/2022 and older    FINDINGS:  Pre-procedure    Consent: Informed consent for the procedure was obtained and time-out was performed prior to the procedure.    Preparation: The site was prepared and draped using maximal sterile barrier technique including cutaneous antisepsis.    Antibiotic administered: Prophylactic dose within 1 hour of procedure start time or 2 hours for vancomycin or fluoroquinolones    Anesthesia/sedation    Level of anesthesia/sedation: Moderate sedation (conscious sedation)    Anesthesia/sedation administered by: Independent trained observer under attending supervision with continuous monitoring of the patient's level of consciousness and physiologic status    Total intra-service sedation time (minutes): 50    Right genitourinary catheter exchange    Local anesthesia was administered.  Initially an Amplatz wire was attempted to be placed through the existing tube in the native kidney, which was unsuccessful and a Glidewire was then used to remove the tube over a wire.  Significant encrustation was noted in the tube.  The new tube was advanced into position.    Collecting system dilation: Persistent moderate dilation    New catheter(s): 12 [place]    Final catheter position(s): Pigtail in the renal pelvis    External catheter securement: Non-absorbable suture    Additional findings: None    Left genitourinary catheter exchange    Local anesthesia was administered. A both an Amplatz and glide wire were attempted to be placed through the catheter combo unsuccessful due to the significant incrustation.  Next a 4 [place]guillan Kumpe the catheter and Glidewire were advanced adjacent to the indwelling tube common access to the kidney was obtained.  The indwelling tube was removed.  The new tube was advanced into position.    Collecting system dilation: Persistent mild dilation    New  catheter(s): 12 [place]    Final catheter position(s): Pigtail in the renal pelvis    External catheter securement: Non-absorbable suture    Additional findings: None    Additional genitourinary system intervention    Genitourinary intervention: None    Location of intervention: Not applicable    Device used: Not applicable    Description of intervention: Not applicable    Post-intervention findings: Not applicable    Contrast    Contrast agent: Omnipaque 300    Contrast volume (mL): 5 0    Radiation Dose    Fluoroscopy time ( minutes): 16.1    Reference air kerma ( mGy): 69    Kerma area product ( mGy-cm2): 1653    Additional Details    Additional description of procedure: None    Equipment details: None    Specimens removed: None. A sample was not sent for analysis.    Estimated blood loss (mL): Less than 10    Standardized report: [email or web address]    Attestation    Signer name: Susan Cueva    I attest that I was present for the entire procedure. I reviewed the stored images and agree with the report as written.  Impression: Bilateral 12 [place] PCN exchange, with significant encrustation early on the tubes.    Plan:    Plan for check and exchange in 2 months.  If there is concern for tube malpositioning or patency please contact IR    _______________________________________________________________    Electronically signed by: Susan Cueva  Date:    08/29/2022  Time:    14:42      All imaging within the last 24 hours was reviewed.       Discharge Planning   OK: 9/2/2022     Code Status: Full Code   Is the patient medically ready for discharge?: No    Reason for patient still in hospital (select all that apply): Patient trending condition, Treatment, and Consult recommendations  Discharge Plan A: Home with family                Assessment/Plan:      * Pyelonephritis  - recently presented with pyelonephritis of the left kidney and was being treated with oral cefdinir, but presents this evening with  right-sided flank pain with UA with >100 WBCs concerning for infection with known nephrolithiasis of the right kidney  - Urine culture from recent ED visit growing >100,000 CFUs of pan-sensitive Klebsiella pneumoniae  - start IV ceftriaxone 1 g q24h given sensitivities of Klebsiella pneumoniae but will f/u urine culture  - IV morphine PRN pain  - IV ondansetron PRN nausea/vomiting  - Urology following patient, recommend bilateral nephrostomy exchange with IR - IR completed on 8/29  - ID has follow patient in the past; consulted for recommendations for antibiotic choice and length therapy given complicated UTI  - ID recs: continue rocephin for now with cipro as outpatient until stones addressed  - patient will need to f/u with urology as outpatient for stone management - appt scheduled and case management arranging transportation    Nephrostomy status  Last exchanged this admit on 8/29 by IR      Metabolic acidosis  - patient presenting with a non-anion gap metabolic acidosis with bicarbonate of 12 likely secondary to acute on chronic kidney injury  - will continue to monitor, addressing acute kidney injury as noted  - continue na bicarb    Presence of urostomy  Urine present to ostomy despite nephrostomies in place  -nursing care prn      Ileostomy in place  Emptying and bag change prn with nursing assistance      Hydronephrosis        ODESSA (acute kidney injury)  Estimated Creatinine Clearance: 59.8 mL/min (based on SCr of 1.2 mg/dL).  Patient with acute kidney injury likely due to post-obstructive d/t obstruction secondary to cervical cancer and fibrosis ODESSA is currently worsening. Labs reviewed- Renal function/electrolytes with Estimated Creatinine Clearance: 59.8 mL/min (based on SCr of 1.2 mg/dL).   - gave 1 liter of lactated ringer's solution  - Monitor urine output and serial BMP and adjust therapy as needed  - Avoid nephrotoxins and renally dose meds for GFR listed above  -imiproving      Cervical cancer  -  patient has a history of cervical cancer and is s/p surgical resection with pelvic radiation with a history of an open transverse colon conduit and urinary diversion complicated by bowel perforation and subsequently received hemicolectomy and ileostomy creation  - Wound Care consult placed for ostomy care          VTE Risk Mitigation (From admission, onward)         Ordered     IP VTE HIGH RISK PATIENT  Once         08/26/22 2104     Place sequential compression device  Until discontinued         08/26/22 2104     IP VTE HIGH RISK PATIENT  Once         08/26/22 2104     Place sequential compression device  Until discontinued         08/26/22 2104              I have assessed these findings virtually using a telemed platform and with assistance of the bedside nurse.        The attending portion of this evaluation, treatment, and documentation was performed per Kiki Culp MD via Telemedicine AudioVisual using the secure [employer] software platform with 2 way audio/video. The provider was located off-site and the patient is located in the hospital. The aforementioned video software was utilized to document the relevant history and physical exam    Kiki Culp MD  Department of [facility] Medicine   [facility] - Surgery

## 2022-09-01 NOTE — ASSESSMENT & PLAN NOTE
- recently presented with pyelonephritis of the left kidney and was being treated with oral cefdinir, but presents this evening with right-sided flank pain with UA with >100 WBCs concerning for infection with known nephrolithiasis of the right kidney  - Urine culture from recent ED visit growing >100,000 CFUs of pan-sensitive Klebsiella pneumoniae  - IV ceftriaxone 1 g q24h given sensitivities of Klebsiella pneumoniae  - IV morphine PRN pain  - IV ondansetron PRN nausea/vomiting  - Urology following patient, recommend bilateral nephrostomy exchange with IR - IR completed on 8/29  - ID has followed patient in the past; consulted for recommendations for antibiotic choice and length therapy given complicated UTI  - ID recs: continue Rocephin for now with Cipro as outpatient until stones addressed  - Follow up with urology as outpatient for stone management - appt scheduled and case management arranging transportation

## 2022-09-01 NOTE — NURSING
Pt ready to discharge. D/c instructions given to pt. Verbalized understanding. No further questions asked. Pt stated she is knowledgeable on care for ileostomy, urostomy and aleah nephrostomy. Reinforced education. Verbalized understanding. Prescriptions delivered to bedside. Removed PIV. Tolerated well. Informed pt's  for transportation. Awaiting for spouse to arrive. Wctm until departure.

## 2022-09-01 NOTE — PLAN OF CARE
[facility]  Discharge Final Note    Primary Care Provider: No primary care provider on file.    Expected Discharge Date: 9/1/2022    Final Discharge Note (most recent)       Final Note - 09/01/22 1530          Final Note    Assessment Type Final Discharge Note     Anticipated Discharge Disposition Home or Self Care     What phone number can be called within the next 1-3 days to see how you are doing after discharge? --   [phone]    Hospital Resources/Appts/Education Provided Appointments scheduled and added to AVS                     Important Message from Medicare             Contact Info       Denise Brooks NP   Specialty: Urology    [address] [place] [place] [address]   Phone: [phone]       Next Steps: Follow up    Instructions: Urology follow-up scheduled for 9/8/22 @ 1PM    [facility] GYNECOLOGICAL ONCOLOGY   Specialty: Gynecologic Oncology    [address]  [place] [address]   Phone: [phone]       Next Steps: Schedule an appointment as soon as possible for a visit in 1 week(s)    Instructions: To establish care              Future Appointments   Date Time Provider Department Center   9/8/2022  1:00 PM Denise Brooks NP [facility]   9/9/2022 10:30 AM Megha Joseph PA-C [facility] ID [facility]     Patient discharged home to care of family on 9/1/22.

## 2022-09-02 ENCOUNTER — TELEPHONE (OUTPATIENT)
Dept: INTERVENTIONAL RADIOLOGY/VASCULAR | Facility: CLINIC | Age: 57
End: 2022-09-02
Payer: MEDICAID

## 2022-09-02 ENCOUNTER — PATIENT OUTREACH (OUTPATIENT)
Dept: ADMINISTRATIVE | Facility: CLINIC | Age: 57
End: 2022-09-02
Payer: MEDICAID

## 2022-09-02 DIAGNOSIS — N13.5 OBSTRUCTION OF URETER, UNSPECIFIED LATERALITY: Primary | ICD-10-CM

## 2022-09-02 NOTE — PLAN OF CARE
[facility]  Discharge Final Note    Primary Care Provider: No primary care provider on file.    Expected Discharge Date: 9/1/2022    Final Discharge Note (most recent)       Final Note - 09/02/22 1539          Final Note    Assessment Type Final Discharge Note     Anticipated Discharge Disposition Home or Self Care     What phone number can be called within the next 1-3 days to see how you are doing after discharge? --   [phone]                    Important Message from Medicare           Future Appointments   Date Time Provider Department Center   9/8/2022  1:00 PM Denise Brooks NP [facility]   9/9/2022 10:30 AM Megha Joseph PA-C [facility]     Patient discharged home to care of spouse on 9/1/22.    Contact Info       Denise Brooks NP   Specialty: Urology    [address] [place] [address]   Phone: [phone]       Next Steps: Follow up    Instructions: Urology follow-up scheduled for 9/8/22 @ 1PM    [facility] GYNECOLOGICAL ONCOLOGY   Specialty: Gynecologic Oncology    [address]  [place] [address]   Phone: [phone]       Next Steps: Schedule an appointment as soon as possible for a visit in 1 week(s)    Instructions: To establish care

## 2022-09-02 NOTE — PROGRESS NOTES
C3 nurse spoke with Edita Riley for a TCC post hospital discharge follow up call. The patient has a scheduled [facility] appointment with Megha Joseph on 09/09/2022 @ 1030.

## 2022-09-06 ENCOUNTER — PES CALL (OUTPATIENT)
Dept: ADMINISTRATIVE | Facility: CLINIC | Age: 57
End: 2022-09-06
Payer: MEDICAID

## 2022-09-06 NOTE — PHYSICIAN QUERY
PT Name: Edita Riley  MR #: [id number]    DOCUMENTATION CLARIFICATION     CDS/: Rylee Alonso RN              Contact information: [email or web address]    This form is a permanent document in the medical record.     Query Date: September 6, 2022    By submitting this query, we are merely seeking further clarification of documentation. Please utilize your independent clinical judgment when addressing the question(s) below.    Supporting Clinical Findings Location in Medical Record   Pyelonephritis - recently presented with pyelonephritis of the left kidney and was being treated with oral cefdinir, but presents this evening with right-sided flank pain with UA with >100 WBCs concerning for infection with known nephrolithiasis of the right kidney    Urine culture from recent ED visit growing >100,000 CFUs of pan-sensitive Klebsiella pneumoniae  start IV ceftriaxone 1 g q24h     Nephrostomy status-Last exchanged this admit on 8/29 by IR          She is here today with right-sided flank pain for two days likey from the stones.   The pain is a constant stabbing pain that is worse with movement.      Both nephrostomy tubes are draining appropriately, urine is non-bloody and green, stoma looks healthy with green liquid bowel contents. She denies f/v, n/v. Nephrostomy tubes last exchanged May 2022, previously exchanged every 3 months.  8/26 [facility] Med MD PN       Provider, please clarify if there is any clinical correlation between Pyelonephritis and Nephrostomy tubes:            Are the conditions:      [ x ] Due to or associated with each other - POA     [  ] Unrelated to each other     [  ] Other explanation (Please Specify): ______________     [  ] Clinically Undetermined                                                                               Please document in your progress notes daily for the duration of treatment until resolved and include in your discharge summary.

## 2022-09-07 NOTE — PROGRESS NOTES
Subjective:      Edita Riley is a 57 y.o. female who presents for evaluation of right ureter stones.      Pmhx of HTN, cervical cancer s/p surgery and pelvic radiation, open transverse colon conduit urinary diversion on 9/11/2020 complicated by bowel perforation s/p take back for hemicolectomy and ileostomy creation (9/17/20). She was seen in ED on 8/24 for left-sided flank pain and was diagnosed with left-sided pyelonephritis and discharged on Cefdinir, incidental right-sided 4mm and 7mm uretal stones noted at that time on CT. Interim urine cultures grew pan-sensitive Klebsiella pneumoniae.     She presented back to ED on 8/26 with new onset right-sided flank pain . The pain is a constant stabbing pain that is worse with movement. She has tried tylenol for the pain without relief. She reported that her left-sided pain has improved. She denied fever and chills. Both nephrostomy tubes were patent as was urostomy and ileostomy.Cr 1.8 (baseline 1.3), wbc 9, hgb 12.5.    She presents today to discuss treatment of right ureter stones. CT from 2017 reviewed with similar findings. She is currently on ciprofloxacin until 9/15. She reports bilateral flank pain, worse on left. No issues with nephrostomy tubes or urostomy.    Nephrostomy tubes last exchanged August 2022, previously exchanged every 3 months.  Loopogram from 4/19/21 showed bilateral reflux, more sluggish on the left compared to the right side.      The following portions of the patient's history were reviewed and updated as appropriate: allergies, current medications, past family history, past medical history, past social history, past surgical history and problem list.    Review of Systems  Constitutional: no fever or chills  ENT: no nasal congestion or sore throat  Respiratory: no cough or shortness of breath  Cardiovascular: no chest pain or palpitations  Gastrointestinal: no nausea or vomiting, tolerating diet  Genitourinary: as per  "HPI  Hematologic/Lymphatic: no easy bruising or lymphadenopathy  Musculoskeletal: positive for back pain and muscle weakness  Neurological: no seizures or tremors  Behavioral/Psych: no auditory or visual hallucinations     Objective:   Vital Signs:/72 (BP Location: Left arm, Patient Position: Sitting, BP Method: Large (Automatic))   Pulse 93   Resp 18   Ht 5' 9" (1.753 m)   Wt 82.2 kg (181 lb 5.3 oz)   LMP 06/08/2014 (Approximate)   BMI 26.78 kg/m²     Physical Exam   General: alert and oriented, no acute distress  Head: normocephalic, atraumatic  Neck: supple, no lymphadenopathy, normal ROM, no masses  Respiratory: Symmetric expansion, non-labored breathing  Cardiovascular: regular rate and rhythm, nomal pulses, no peripheral edema  Abdomen: soft, non tender, urostomy patent; ileostomy patent, both CDI  Neuro: alert and oriented x3, no gross deficits  Psych: non-anxious  No CVAT on right   +CVAT on left     Lab Review   Urinalysis demonstrates no specimen   Lab Results   Component Value Date    WBC 8.05 08/31/2022    HGB 11.3 (L) 08/31/2022    HCT 36.1 (L) 08/31/2022    HCT 39 08/26/2022    MCV 90 08/31/2022     08/31/2022     Lab Results   Component Value Date    CREATININE 1.2 09/01/2022    BUN 12 09/01/2022       Imaging   CT ABDOMEN PELVIS WITHOUT CONTRAST     CLINICAL HISTORY:  UTI, recurrent/complicated (Female);     TECHNIQUE:  Axial images of the abdomen and pelvis were acquired without the use of IV contrast.  Coronal and sagittal reconstructions were also obtained     COMPARISON:  IR nephrostomy tube change 05/02/2022     CT abdomen pelvis 06/17/2021     FINDINGS:  Heart: Normal in size. No pericardial effusion.     Lungs: Visualized portions of the lungs are clear without consolidation.  Few scattered bands of subsegmental atelectasis.  4 mm right middle lobe nodule series 2, image 4 similar to December 2020.  Additional 4 mm right pleural base nodule slightly more conspicuous compared " to prior.     Liver: Normal in size and contour.  No focal hepatic lesion.     Gallbladder: Surgically absent.     Bile Ducts: No evidence of dilated ducts.     Pancreas: No mass or peripancreatic fat stranding.     Spleen: Unremarkable.     Stomach and duodenum: Unremarkable.     Adrenals: Unremarkable.     Kidneys/Ureters: Postoperative change of colon conduit urinary diversion with left lower quadrant urostomy.  Bilateral nephrostomy tubes in place.  Kidneys normal in size and location.  Right renal stones measuring 4 mm and 7 mm.  No right hydronephrosis.  New 4 mm stone within the proximal right ureter (axial series 2, image 96).  Mild prominence/postsurgical change of the right ureter, similar to prior.  Moderate left hydronephrosis and wall thickening of the left collecting system/ureter, similar to prior.  Punctate focus of air in the left collecting system (axial series 2, image 73).  No left nephrolithiasis or ureteral stones.     Bladder: Not distended.     Reproductive organs: Uterus surgically absent.     Bowel/Mesentery: Small bowel is normal in caliber with no evidence of obstruction. No evidence of inflammation or wall thickening.  Postoperative change of right hemicolectomy with ileostomy in the right lower quadrant.  Long Charlotte's pouch relatively decompressed with small amount of high density material at its proximal end.  Colon demonstrates no focal wall thickening.     Peritoneum: No intraperitoneal free air or fluid.     Lymph nodes: No retroperitoneal lymphadenopathy.  Scattered para-aortic nodes noted similar.     Vasculature: No aneurysm. No significant calcific atherosclerosis.     Abdominal wall:  No parastomal hernias.  No fluid collections along the course of the nephrostomy tubes.     Bones: No acute fracture. No suspicious osseous lesions.     Impression:     1. Postoperative change of colon conduit urinary diversion with left lower quadrant urostomy and bilateral nephrostomy tubes  in place.  2. Right nephrolithiasis and new 4 mm stone within the proximal right ureter.  No right hydronephrosis.  Prominent/postsurgical change of the right ureter, similar to prior.  3. Persistent moderate left hydronephrosis and punctate focus of air as above.  4. Additional findings as above.  5. Right pulmonary micro nodules.  The 4 mm pleural base nodule is more conspicuous.  Attention on follow-up.     Electronically signed by resident: Bro Brambila  Date:                                            08/24/2022  Time:                                           10:16     Electronically signed by: John Mejia MD  Date:                                            08/24/2022  Time:                                           11:30        Assessment and Plan:   1. Urinary tract infection associated with nephrostomy catheter, subsequent encounter  --complete Cipro as directed   - Urine culture; Future  - ketorolac (TORADOL) 10 mg tablet; Take 1 tablet (10 mg total) by mouth every 6 (six) hours as needed for Pain.  Dispense: 30 tablet; Refill: 0    2. Right ureteral stone  --CT reviewed with Dr. Mcgowan  --No CVAT on right side; per chart review CT 2017 similar to CT 2022  --Discussed ESWL vs URS vs contrasted study with next nephrostomy exchange to evaluate ureter stone vs vascular calcification   --She states that she is an established patient of Dr. Balbuena and would like to follow up with her for any additional procedures/ treatment; we will send message to her staff about f/u appt; if this is not possible we will arrange for treatment with Dr. Mcgowan          This note is dictated on M*Modal word recognition program.  There are word recognition mistakes that are occasionally missed on review.

## 2022-09-08 ENCOUNTER — OFFICE VISIT (OUTPATIENT)
Dept: UROLOGY | Facility: CLINIC | Age: 57
End: 2022-09-08
Payer: MEDICAID

## 2022-09-08 VITALS
HEART RATE: 93 BPM | HEIGHT: 69 IN | BODY MASS INDEX: 26.85 KG/M2 | SYSTOLIC BLOOD PRESSURE: 114 MMHG | RESPIRATION RATE: 18 BRPM | WEIGHT: 181.31 LBS | DIASTOLIC BLOOD PRESSURE: 72 MMHG

## 2022-09-08 DIAGNOSIS — T83.512D URINARY TRACT INFECTION ASSOCIATED WITH NEPHROSTOMY CATHETER, SUBSEQUENT ENCOUNTER: ICD-10-CM

## 2022-09-08 DIAGNOSIS — N39.0 URINARY TRACT INFECTION ASSOCIATED WITH NEPHROSTOMY CATHETER, SUBSEQUENT ENCOUNTER: ICD-10-CM

## 2022-09-08 DIAGNOSIS — N20.1 RIGHT URETERAL STONE: ICD-10-CM

## 2022-09-08 PROCEDURE — 1160F PR REVIEW ALL MEDS BY PRESCRIBER/CLIN PHARMACIST DOCUMENTED: ICD-10-PCS | Mod: CPTII,,, | Performed by: NURSE PRACTITIONER

## 2022-09-08 PROCEDURE — 3078F PR MOST RECENT DIASTOLIC BLOOD PRESSURE < 80 MM HG: ICD-10-PCS | Mod: CPTII,,, | Performed by: NURSE PRACTITIONER

## 2022-09-08 PROCEDURE — 3074F PR MOST RECENT SYSTOLIC BLOOD PRESSURE < 130 MM HG: ICD-10-PCS | Mod: CPTII,,, | Performed by: NURSE PRACTITIONER

## 2022-09-08 PROCEDURE — 3074F SYST BP LT 130 MM HG: CPT | Mod: CPTII,,, | Performed by: NURSE PRACTITIONER

## 2022-09-08 PROCEDURE — 99214 OFFICE O/P EST MOD 30 MIN: CPT | Mod: S$PBB,,, | Performed by: NURSE PRACTITIONER

## 2022-09-08 PROCEDURE — 99213 OFFICE O/P EST LOW 20 MIN: CPT | Mod: PBBFAC,PN | Performed by: NURSE PRACTITIONER

## 2022-09-08 PROCEDURE — 3008F BODY MASS INDEX DOCD: CPT | Mod: CPTII,,, | Performed by: NURSE PRACTITIONER

## 2022-09-08 PROCEDURE — 99214 PR OFFICE/OUTPT VISIT, EST, LEVL IV, 30-39 MIN: ICD-10-PCS | Mod: S$PBB,,, | Performed by: NURSE PRACTITIONER

## 2022-09-08 PROCEDURE — 99999 PR PBB SHADOW E&M-EST. PATIENT-LVL III: CPT | Mod: PBBFAC,,, | Performed by: NURSE PRACTITIONER

## 2022-09-08 PROCEDURE — 1159F PR MEDICATION LIST DOCUMENTED IN MEDICAL RECORD: ICD-10-PCS | Mod: CPTII,,, | Performed by: NURSE PRACTITIONER

## 2022-09-08 PROCEDURE — 99999 PR PBB SHADOW E&M-EST. PATIENT-LVL III: ICD-10-PCS | Mod: PBBFAC,,, | Performed by: NURSE PRACTITIONER

## 2022-09-08 PROCEDURE — 3008F PR BODY MASS INDEX (BMI) DOCUMENTED: ICD-10-PCS | Mod: CPTII,,, | Performed by: NURSE PRACTITIONER

## 2022-09-08 PROCEDURE — 1159F MED LIST DOCD IN RCRD: CPT | Mod: CPTII,,, | Performed by: NURSE PRACTITIONER

## 2022-09-08 PROCEDURE — 1111F PR DISCHARGE MEDS RECONCILED W/ CURRENT OUTPATIENT MED LIST: ICD-10-PCS | Mod: CPTII,,, | Performed by: NURSE PRACTITIONER

## 2022-09-08 PROCEDURE — 1160F RVW MEDS BY RX/DR IN RCRD: CPT | Mod: CPTII,,, | Performed by: NURSE PRACTITIONER

## 2022-09-08 PROCEDURE — 3078F DIAST BP <80 MM HG: CPT | Mod: CPTII,,, | Performed by: NURSE PRACTITIONER

## 2022-09-08 PROCEDURE — 1111F DSCHRG MED/CURRENT MED MERGE: CPT | Mod: CPTII,,, | Performed by: NURSE PRACTITIONER

## 2022-09-08 RX ORDER — KETOROLAC TROMETHAMINE 10 MG/1
10 TABLET, FILM COATED ORAL EVERY 6 HOURS PRN
Qty: 30 TABLET | Refills: 0 | Status: ON HOLD | OUTPATIENT
Start: 2022-09-08 | End: 2023-04-25 | Stop reason: HOSPADM

## 2022-09-16 ENCOUNTER — OFFICE VISIT (OUTPATIENT)
Dept: UROLOGY | Facility: CLINIC | Age: 57
End: 2022-09-16
Payer: MEDICAID

## 2022-09-16 ENCOUNTER — TELEPHONE (OUTPATIENT)
Dept: UROLOGY | Facility: CLINIC | Age: 57
End: 2022-09-16
Payer: MEDICAID

## 2022-09-16 VITALS
WEIGHT: 186.94 LBS | SYSTOLIC BLOOD PRESSURE: 111 MMHG | BODY MASS INDEX: 27.69 KG/M2 | HEIGHT: 69 IN | DIASTOLIC BLOOD PRESSURE: 72 MMHG | HEART RATE: 90 BPM

## 2022-09-16 DIAGNOSIS — N20.1 URETERAL CALCULUS: ICD-10-CM

## 2022-09-16 DIAGNOSIS — Z93.6 NEPHROSTOMY STATUS: Primary | ICD-10-CM

## 2022-09-16 DIAGNOSIS — Z93.6 PRESENCE OF UROSTOMY: ICD-10-CM

## 2022-09-16 PROCEDURE — 99213 OFFICE O/P EST LOW 20 MIN: CPT | Mod: PBBFAC | Performed by: UROLOGY

## 2022-09-16 PROCEDURE — 3078F PR MOST RECENT DIASTOLIC BLOOD PRESSURE < 80 MM HG: ICD-10-PCS | Mod: CPTII,,, | Performed by: UROLOGY

## 2022-09-16 PROCEDURE — 1159F PR MEDICATION LIST DOCUMENTED IN MEDICAL RECORD: ICD-10-PCS | Mod: CPTII,,, | Performed by: UROLOGY

## 2022-09-16 PROCEDURE — 3074F PR MOST RECENT SYSTOLIC BLOOD PRESSURE < 130 MM HG: ICD-10-PCS | Mod: CPTII,,, | Performed by: UROLOGY

## 2022-09-16 PROCEDURE — 99215 PR OFFICE/OUTPT VISIT, EST, LEVL V, 40-54 MIN: ICD-10-PCS | Mod: S$PBB,,, | Performed by: UROLOGY

## 2022-09-16 PROCEDURE — 1111F PR DISCHARGE MEDS RECONCILED W/ CURRENT OUTPATIENT MED LIST: ICD-10-PCS | Mod: CPTII,,, | Performed by: UROLOGY

## 2022-09-16 PROCEDURE — 1111F DSCHRG MED/CURRENT MED MERGE: CPT | Mod: CPTII,,, | Performed by: UROLOGY

## 2022-09-16 PROCEDURE — 99215 OFFICE O/P EST HI 40 MIN: CPT | Mod: S$PBB,,, | Performed by: UROLOGY

## 2022-09-16 PROCEDURE — 1159F MED LIST DOCD IN RCRD: CPT | Mod: CPTII,,, | Performed by: UROLOGY

## 2022-09-16 PROCEDURE — 3078F DIAST BP <80 MM HG: CPT | Mod: CPTII,,, | Performed by: UROLOGY

## 2022-09-16 PROCEDURE — 3008F PR BODY MASS INDEX (BMI) DOCUMENTED: ICD-10-PCS | Mod: CPTII,,, | Performed by: UROLOGY

## 2022-09-16 PROCEDURE — 3074F SYST BP LT 130 MM HG: CPT | Mod: CPTII,,, | Performed by: UROLOGY

## 2022-09-16 PROCEDURE — 99999 PR PBB SHADOW E&M-EST. PATIENT-LVL III: ICD-10-PCS | Mod: PBBFAC,,, | Performed by: UROLOGY

## 2022-09-16 PROCEDURE — 99999 PR PBB SHADOW E&M-EST. PATIENT-LVL III: CPT | Mod: PBBFAC,,, | Performed by: UROLOGY

## 2022-09-16 PROCEDURE — 3008F BODY MASS INDEX DOCD: CPT | Mod: CPTII,,, | Performed by: UROLOGY

## 2022-09-16 NOTE — PROGRESS NOTES
CHIEF COMPLAINT:    Mrs. Riley is a 57 y.o. female presenting for a visit for bilateral hydronephrosis in a patient with a transverse colon conduit with ureteral stones.   PRESENTING ILLNESS:    Edita Riley is a 57 y.o. female who has a history of XRT for cervical cancer.  She had right flank pain and ODESSA.  She had right hydronephrosis due to radiation changes to the bladder and because this occurred in the very early days of the pandemic (March 2020), she had bilateral ureteral stents placed.  She was later found to have developed bilateral ureteral obstruction and underwent a colon conduit.  This was complicated by bowel perforation away from the anastomosis.      She was latera found to have bilateral hydronephrosis and had bilateral percutaneous nephrostomy tubes.  A loopogram showed reflux on the right and also the left side but the left was slower to reflux and there was a segment of the left ureter that was sluggish to drain.      She has since been managed with percutaneous nephrostomy tube changes since April of last year.  She returns today because she has right ureteral calculi.  Urine is noted to drain into the transverse colon conduit and from the percutaneous nephrostomy tubes.  She has had issues with pyelonephritis.  When I had last seen her in April 2021, she was very thin but today, she appears to be much more robust and in good spirits. She changes the pouch 3 times a week.     REVIEW OF SYSTEMS:    Review of Systems   Constitutional:  Negative for weight loss.   HENT: Negative.     Eyes: Negative.    Respiratory: Negative.     Cardiovascular: Negative.    Gastrointestinal: Negative.    Genitourinary:         Transverse colon conduit   Musculoskeletal: Negative.    Skin: Negative.    Neurological: Negative.    Endo/Heme/Allergies: Negative.    Psychiatric/Behavioral: Negative.       PATIENT HISTORY:    Past Medical History:   Diagnosis Date    Abnormal mammogram 8/25/2020     Acute deep vein thrombosis (DVT) of lower extremity 12/9/2020    Anxiety     Cervical cancer 2014    Chronic back pain     Depression     Diarrhea due to malabsorption 11/14/2018    DVT of lower extremity, bilateral 11/4/2020    Fibromyalgia     Fungemia 9/27/2020    Generalized abdominal pain 8/25/2020    GERD (gastroesophageal reflux disease)     Hemifacial spasm 9/16/2015    Hiatal hernia 2014    History of cervical cancer 10/11/2018    Hx of psychiatric care     Cymbalta, trazodone    Hypertension     Hypomagnesemia 11/21/2018    Lactose intolerance     Metastatic squamous cell carcinoma to lymph node 10/11/2018    Nephrostomy tube displaced     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 drug 11/14/2018    Osteoarthritis of back     Peritonitis 9/22/2020    Pseudomonas urinary tract infection 4/21/2021    Psychiatric problem     Schatzki's ring 9/14/2015    Seen on outside EGD 05/2014, underwent esophageal dilatation. Bx were negative.     Stroke 1972    Urinary tract infection associated with nephrostomy catheter 6/11/2020    Wound infection after surgery 9/24/2020       Past Surgical History:   Procedure Laterality Date    ANTEGRADE NEPHROSTOGRAPHY Bilateral 9/28/2020    Procedure: Nephrostogram - antegrade;  Surgeon: Leslie Balbuena MD;  Location: [facility] OR [address] [place] [address];  Service: Urology;  Laterality: Bilateral;    ANTEGRADE NEPHROSTOGRAPHY Left 4/20/2021    Procedure: NEPHROSTOGRAM, ANTEGRADE;  Surgeon: Leslie Balbuena MD;  [facility];  Service: Urology;  Laterality: Left;    BILATERAL OOPHORECTOMY  2015    CHOLECYSTECTOMY  11/09/2016    Done at [facility], path showed chronic cholecystitis and gallstones    cold knife conization  2014    COLECTOMY, RIGHT  9/17/2020    Procedure: COLECTOMY, RIGHT Extended;  Surgeon: Hammad Reynolds MD;  Location: [facility] OR [address] [place] [address];  Service: Colon and Rectal;;    COLONOSCOPY  2014    COLONOSCOPY N/A 10/24/2016    at [facility], Dr Thomas    COLONOSCOPY N/A 5/18/2018     Procedure: COLONOSCOPY;  Surgeon: Arden Gutiérrez MD;  Location: [facility] (4TH FLR);  Service: Endoscopy;  Laterality: N/A;    COLONOSCOPY N/A 7/28/2020    Procedure: COLONOSCOPY;  Surgeon: Hammad Reynolds MD;  Location: [facility] ENDO (4TH FLR);  Service: Colon and Rectal;  Laterality: N/A;  covid test [facility] 7/25    CYSTOSCOPY WITH URETEROSCOPY, RETROGRADE PYELOGRAPHY, AND INSERTION OF STENT Bilateral 3/21/2020    Procedure: CYSTOSCOPY, WITH RETROGRADE PYELOGRAM,;  Surgeon: Leslie Balbuena MD;  Location: [facility] OR 1ST FLR;  Service: Urology;  Laterality: Bilateral;    ESOPHAGOGASTRODUODENOSCOPY  2014    ESOPHAGOGASTRODUODENOSCOPY  11/18/2020    ESOPHAGOGASTRODUODENOSCOPY N/A 11/18/2020    Procedure: ESOPHAGOGASTRODUODENOSCOPY (EGD);  Surgeon: Zenon Spencer MD;  Location: [facility];  Service: Endoscopy;  Laterality: N/A;    ESOPHAGOGASTRODUODENOSCOPY N/A 12/11/2020    Procedure: EGD (ESOPHAGOGASTRODUODENOSCOPY);  Surgeon: Juancho Muse MD;  Location: [facility] (2ND FLR);  Service: Endoscopy;  Laterality: N/A;    HYSTERECTOMY  2014    [facility] for cervical cancer    ILEOSTOMY  9/17/2020    Procedure: CREATION, ILEOSTOMY;  Surgeon: Hammad Reynolds MD;  Location: [facility] OR 2ND FLR;  Service: Colon and Rectal;;    LOOPOGRAM N/A 4/20/2021    Procedure: LOOPOGRAM;  Surgeon: Leslie Balbuena MD;  Location: [facility] OR 1ST FLR;  Service: Urology;  Laterality: N/A;    MOBILIZATION OF SPLENIC FLEXURE N/A 9/11/2020    Procedure: MOBILIZATION, COLONIC;  Surgeon: Hammad Reynolds MD;  Location: [facility] OR 2ND FLR;  Service: Colon and Rectal;  Laterality: N/A;    NEPHROSTOGRAPHY Bilateral 4/17/2021    Procedure: Nephrostogram;  Surgeon: Celeste Surgeon;  Location: [facility];  Service: Anesthesiology;  Laterality: Bilateral;    OOPHORECTOMY      RETROPERITONEAL LYMPHADENECTOMY Right 9/17/2018    Procedure: LYMPHADENECTOMY, RETROPERITONEUM;  Surgeon: Sebas Reed MD;  Location: [facility] OR 2ND FLR;  Service: General;  Laterality: Right;   ILIAC       Family History   Problem Relation Age of Onset    Heart disease Sister     Heart disease Maternal Grandmother     Colon cancer Father     Esophageal cancer Father     Cancer Father         Lung-smoker    Cancer Mother         Cervical    Cervical cancer Mother     Breast cancer Maternal Aunt     Suicide Daughter         jumped from parking structure    Drug abuse Daughter     Drug abuse Daughter        Social History     Socioeconomic History    Marital status:      Spouse name: Hammad    Number of children: 2   Tobacco Use    Smoking status: Former    Smokeless tobacco: Never   Substance and Sexual Activity    Alcohol use: No     Alcohol/week: 0.0 standard drinks    Drug use: No    Sexual activity: Yes     Partners: Male     Birth control/protection: None     Comment:  19 years since    Social History Narrative    , twin daughters (1  2018), disabled due to childhood stroke, [place]ze sophia       Allergies:  Bee sting [allergen ext-venom-honey bee] and Grass pollen-bermuda, standard    Medications:  Outpatient Encounter Medications as of 2022   Medication Sig Dispense Refill    acetaminophen (TYLENOL) 325 MG tablet Take 2 tablets (650 mg total) by mouth 3 (three) times daily.  0    ciprofloxacin HCl (CIPRO) 500 MG tablet Take 1 tablet (500 mg total) by mouth every 12 (twelve) hours. for 14 days 28 tablet 0    gabapentin (NEURONTIN) 100 MG capsule Take 2 capsules (200 mg total) by mouth every evening. 90 capsule 3    ketorolac (TORADOL) 10 mg tablet Take 1 tablet (10 mg total) by mouth every 6 (six) hours as needed for Pain. 30 tablet 0    melatonin (MELATIN) 3 mg tablet Take 2 tablets (6 mg total) by mouth nightly as needed for Insomnia. 60 tablet 0    mirtazapine (REMERON SOL-TAB) 15 MG disintegrating tablet Dissolve 1 tablet (15 mg total) by mouth nightly. 30 tablet 11    sodium bicarbonate 650 MG tablet Take 2 tablets (1,300 mg total) by mouth 2  (two) times daily. 120 tablet 11    sucralfate (CARAFATE) 1 gram tablet Take 1 tablet (1 g total) by mouth 4 (four) times daily before meals and nightly. 120 tablet 2     No facility-administered encounter medications on file as of 9/16/2022.         PHYSICAL EXAMINATION:    The patient generally appears in good health, is appropriately interactive, and is in no apparent distress.    Skin: No lesions.    Mental: Cooperative with normal affect.    Neuro: Grossly intact.    HEENT: Normal. No evidence of lymphadenopathy.    Chest:  normal inspiratory effort.    Abdomen:  Soft, non-tender. No masses or organomegaly. Bladder is not palpable. No evidence of flank discomfort. No evidence of inguinal hernia.    Extremities: No clubbing, cyanosis, or edema    Back:  the sites is CDI.  Dressings were changed.   Clear urine draining intoe each nephrstomy tube bag and from the colon conduit.  The stoma is pink  LABS:    Lab Results   Component Value Date    BUN 12 09/01/2022    CREATININE 1.2 09/01/2022     CT scan from 8/24/2022 reviewed.  Punctate calculi in the right ureter.      Last time the nephrostomy tube was changed was on 8/29/2022      IMPRESSION:    Encounter Diagnoses   Name Primary?    Nephrostomy status Yes    Presence of urostomy     Ureteral calculus        PLAN:    1.  Referred to Dr. Russ for antegrade removal of the ureteral stones  2.  Will then see if she can tolerate a clamping trial in an effort to remove the nephrstomy tubes.       I spent 40 minutes with the patient of which more than half was spent in direct consultation with the patient in regards to our treatment and plan.

## 2022-09-16 NOTE — TELEPHONE ENCOUNTER
I spoke to pt and scheduled appt per below.  Pt repeated back and VU location. Thank you.     9/28/2022 Status: [facility]   Appt Time: 8:45 AM         ----- Message from Hellen Beltran LPN sent at 9/16/2022  3:43 PM CDT -----  Regarding: Ureteral Stone Removal  Good afternoon,     Dr. Balbuena is referring this patient to Dr. Russ for treatment of her ureteral stones, causing pain 8/10. She has asked that I schedule an appointment but there is no availability populating for me and I don't want to override without approval.     Ms. Limon, can you please assist in getting pt scheduled?    Thanks in advance

## 2022-09-20 ENCOUNTER — HOSPITAL ENCOUNTER (EMERGENCY)
Facility: HOSPITAL | Age: 57
Discharge: HOME | End: 2022-09-20
Attending: EMERGENCY MEDICINE
Payer: MEDICAID

## 2022-09-20 VITALS
RESPIRATION RATE: 16 BRPM | OXYGEN SATURATION: 98 % | DIASTOLIC BLOOD PRESSURE: 56 MMHG | HEART RATE: 87 BPM | TEMPERATURE: 99 F | WEIGHT: 186.94 LBS | SYSTOLIC BLOOD PRESSURE: 114 MMHG | BODY MASS INDEX: 27.61 KG/M2

## 2022-09-20 DIAGNOSIS — Z43.3 COLOSTOMY CARE: ICD-10-CM

## 2022-09-20 DIAGNOSIS — Z43.3 ENCOUNTER FOR ATTENTION TO COLOSTOMY: Primary | ICD-10-CM

## 2022-09-20 PROCEDURE — 99282 EMERGENCY DEPT VISIT SF MDM: CPT | Mod: ,,, | Performed by: PHYSICIAN ASSISTANT

## 2022-09-20 PROCEDURE — 99282 EMERGENCY DEPT VISIT SF MDM: CPT

## 2022-09-20 PROCEDURE — 99282 PR EMERGENCY DEPT VISIT,LEVEL II: ICD-10-PCS | Mod: ,,, | Performed by: PHYSICIAN ASSISTANT

## 2022-09-20 NOTE — FIRST PROVIDER EVALUATION
Medical screening examination initiated.  I have conducted a focused provider triage encounter, findings are as follows:    Brief history of present illness:  Needs bags for ileostomy a question yahaira    Vitals:    09/20/22 1841   BP: (!) 106/58   BP Location: Right arm   Patient Position: Sitting   Pulse: 95   Resp: 18   Temp: 98.5 °F (36.9 °C)   TempSrc: Oral   SpO2: 98%   Weight: 186 lb 15.2 oz (84.8 kg)       Pertinent physical exam:  Well-appearing    Brief workup plan:  Will defer to ED provider    Preliminary workup initiated; this workup will be continued and followed by the physician or advanced practice provider that is assigned to the patient when roomed.

## 2022-09-21 NOTE — ED NOTES
Pt abdomen and ostomy site clean, colostomy bag placed and secured onto self. Urostomy bag placed onto patient

## 2022-09-21 NOTE — ED NOTES
Pt arrives to Ed with c/o need for urostomy and colostomy bag. Pt skin to general abdomen red, moist. Denies use of colostomy bag x few days. Pt sitting in bed, respirations even, unlabored, NAD noted, answering questions appropriately. Ostomy site point moist

## 2022-09-21 NOTE — ED PROVIDER NOTES
Encounter Date: 9/20/2022       History     Chief Complaint   Patient presents with    Needs colostomy bag     Hasn't had colostomy bag since Sunday     Patient is a 57yoF who presents for colostomy/urostomy bags. Patient exchanges colostomy bag BID and does not have another shipment for 1 week.  States the skin around her stoma is irritated. Has not had bag on colostomy site for 2 days. Reports normal output from stoma, normal p.o. intake.  No fever.  The patients available PMH, PSH, Social History, medications, allergies, and triage vital signs were reviewed just prior to their medical evaluation.    Review of patient's allergies indicates:   Allergen Reactions    Bee sting [allergen ext-venom-honey bee]      Rash      Grass pollen-bermuda, standard      rash     Past Medical History:   Diagnosis Date    Abnormal mammogram 8/25/2020    Acute deep vein thrombosis (DVT) of lower extremity 12/9/2020    Anxiety     Cervical cancer 2014    Chronic back pain     Depression     Diarrhea due to malabsorption 11/14/2018    DVT of lower extremity, bilateral 11/4/2020    Fibromyalgia     Fungemia 9/27/2020    Generalized abdominal pain 8/25/2020    GERD (gastroesophageal reflux disease)     Hemifacial spasm 9/16/2015    Hiatal hernia 2014    History of cervical cancer 10/11/2018    Hx of psychiatric care     Cymbalta, trazodone    Hypertension     Hypomagnesemia 11/21/2018    Lactose intolerance     Metastatic squamous cell carcinoma to lymph node 10/11/2018    Nephrostomy tube displaced     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 drug 11/14/2018    Osteoarthritis of back     Peritonitis 9/22/2020    Pseudomonas urinary tract infection 4/21/2021    Psychiatric problem     Schatzki's ring 9/14/2015    Seen on outside EGD 05/2014, underwent esophageal dilatation. Bx were negative.     Stroke 1972    Urinary tract infection associated with nephrostomy catheter 6/11/2020    Wound infection after surgery 9/24/2020     Past Surgical  History:   Procedure Laterality Date    ANTEGRADE NEPHROSTOGRAPHY Bilateral 9/28/2020    Procedure: Nephrostogram - antegrade;  Surgeon: Leslie Balbuena MD;  Location: [facility] OR 1ST FLR;  Service: Urology;  Laterality: Bilateral;    ANTEGRADE NEPHROSTOGRAPHY Left 4/20/2021    Procedure: NEPHROSTOGRAM, ANTEGRADE;  Surgeon: Leslie Balbuena MD;  [facility];  Service: Urology;  Laterality: Left;    BILATERAL OOPHORECTOMY  2015    CHOLECYSTECTOMY  11/09/2016    Done at [facility], path showed chronic cholecystitis and gallstones    cold knife conization  2014    COLECTOMY, RIGHT  9/17/2020    Procedure: COLECTOMY, RIGHT Extended;  Surgeon: Hammad Reynolds MD;  Location: [facility] OR 2ND FLR;  Service: Colon and Rectal;;    COLONOSCOPY  2014    COLONOSCOPY N/A 10/24/2016    at [facility], Dr Gutiérrez    COLONOSCOPY N/A 5/18/2018    Procedure: COLONOSCOPY;  Surgeon: Arden Gutiérrez MD;  Location: [facility] (4TH FLR);  Service: Endoscopy;  Laterality: N/A;    COLONOSCOPY N/A 7/28/2020    Procedure: COLONOSCOPY;  Surgeon: Hammad Reynolds MD;  Location: [facility] (4TH FLR);  Service: Colon and Rectal;  Laterality: N/A;  covid test [place] 7/25    CYSTOSCOPY WITH URETEROSCOPY, RETROGRADE PYELOGRAPHY, AND INSERTION OF STENT Bilateral 3/21/2020    Procedure: CYSTOSCOPY, WITH RETROGRADE PYELOGRAM,;  Surgeon: Leslie Balbuena MD;  [facility];  Service: Urology;  Laterality: Bilateral;    ESOPHAGOGASTRODUODENOSCOPY  2014    ESOPHAGOGASTRODUODENOSCOPY  11/18/2020    ESOPHAGOGASTRODUODENOSCOPY N/A 11/18/2020    Procedure: ESOPHAGOGASTRODUODENOSCOPY (EGD);  Surgeon: Zenon Spencer MD;  Location: [facility];  Service: Endoscopy;  Laterality: N/A;    ESOPHAGOGASTRODUODENOSCOPY N/A 12/11/2020    Procedure: EGD (ESOPHAGOGASTRODUODENOSCOPY);  Surgeon: Juancho Muse MD;  Location: [facility] ENDO (2ND FLR);  Service: Endoscopy;  Laterality: N/A;    HYSTERECTOMY  2014    [facility] for cervical cancer    ILEOSTOMY  9/17/2020     Procedure: CREATION, ILEOSTOMY;  Surgeon: Hammad Reynolds MD;  Location: [facility] OR 2ND FLR;  Service: Colon and Rectal;;    LOOPOGRAM N/A 4/20/2021    Procedure: LOOPOGRAM;  Surgeon: Leslie Balbuena MD;  Location: [facility] OR 1ST FLR;  Service: Urology;  Laterality: N/A;    MOBILIZATION OF SPLENIC FLEXURE N/A 9/11/2020    Procedure: MOBILIZATION, COLONIC;  Surgeon: Hammad Reynolds MD;  Location: [facility] OR 2ND FLR;  Service: Colon and Rectal;  Laterality: N/A;    NEPHROSTOGRAPHY Bilateral 4/17/2021    Procedure: Nephrostogram;  Surgeon: Celeste Surgeon;  Location: [facility] CE[facility]STE;  Service: Anesthesiology;  Laterality: Bilateral;    OOPHORECTOMY      RETROPERITONEAL LYMPHADENECTOMY Right 9/17/2018    Procedure: LYMPHADENECTOMY, RETROPERITONEUM;  Surgeon: Sebas Reed MD;  Location: [facility] OR 2ND FLR;  Service: General;  Laterality: Right;  ILIAC     Family History   Problem Relation Age of Onset    Heart disease Sister     Heart disease Maternal Grandmother     Colon cancer Father     Esophageal cancer Father     Cancer Father         Lung-smoker    Cancer Mother         Cervical    Cervical cancer Mother     Breast cancer Maternal Aunt     Suicide Daughter         jumped from parking structure    Drug abuse Daughter     Drug abuse Daughter     Rectal cancer Neg Hx     Stomach cancer Neg Hx     Crohn's disease Neg Hx     Ulcerative colitis Neg Hx     Diabetes Neg Hx     Hypertension Neg Hx      Social History     Tobacco Use    Smoking status: Former    Smokeless tobacco: Never   Substance Use Topics    Alcohol use: No     Alcohol/week: 0.0 standard drinks    Drug use: No     Review of Systems   Constitutional:  Negative for chills and fever.   Respiratory:  Negative for shortness of breath.    Cardiovascular:  Negative for chest pain.   Gastrointestinal:  Negative for abdominal pain, nausea and vomiting.     Physical Exam     Initial Vitals [09/20/22 1841]   BP Pulse Resp Temp SpO2   (!) 106/58 95 18 98.5 °F (36.9 °C) 98 %       MAP       --         Physical Exam    Nursing note and vitals reviewed.  Constitutional: She appears well-developed and well-nourished. She is not diaphoretic. No distress.   HENT:   Head: Normocephalic and atraumatic.   Right Ear: External ear normal.   Left Ear: External ear normal.   Mouth/Throat: Oropharynx is clear and moist.   Eyes: Conjunctivae and EOM are normal.   Cardiovascular:  Normal rate, regular rhythm and intact distal pulses.           Pulmonary/Chest: Breath sounds normal. No respiratory distress. She has no wheezes. She has no rhonchi. She has no rales.   Abdominal: Abdomen is soft. Bowel sounds are normal.   Colostomy R side without bag-stoma well appearing. +erythematous desquamation of surrounding skin with gastric contents on skin    Urostomy with bag- stoma well appearing   Musculoskeletal:         General: No edema. Normal range of motion.     Neurological: She is alert and oriented to person, place, and time. She has normal strength. No cranial nerve deficit or sensory deficit.   Skin: Skin is warm and dry. Capillary refill takes less than 2 seconds. No rash noted. No erythema. No pallor.   Psychiatric: She has a normal mood and affect. Her behavior is normal. Judgment and thought content normal.       ED Course   Procedures  Labs Reviewed - No data to display       Imaging Results    None          Medications - No data to display  Medical Decision Making:   History:   Old Medical Records: I decided to obtain old medical records.  Old Records Summarized: records from clinic visits and records from previous admission(s).  Initial Assessment:   Needs colostomy and urostomy bag refill, will receive in 7 days. +skin irritated otherwise no issues  ED Management:  Colostomy site cleaned and bag replaced. Educated on daily replacement, given 7 bags for home. Referral to ostomy care via wound care. Patient agreed to plan of care and voiced understanding.  Discharged in stable condition with  strict ED return precautions.    Danita Duran PA-C  I discussed the following case, diagnosis and plan of care with attending physician.                          Clinical Impression:   Final diagnoses:  [Z43.3] Encounter for attention to colostomy (Primary)  [Z43.3] Colostomy care        ED Disposition Condition    Discharge Stable          ED Prescriptions    None       Follow-up Information       Follow up With Specialties Details Why Contact [facility] Surgical Oncology, Orthopedic Surgery, Genetics, Physical Medicine and Rehabilitation, Occupational Therapy, Radiology Schedule an appointment as soon as possible for a visit in 1 week  [address] [facility] [address]  [phone]      [facility]  Schedule an appointment as soon as possible for a visit in 1 week As needed, If symptoms worsen [address] [facility] [address]  [phone]               Danita Duran PA-C  09/21/22 1600

## 2022-09-22 ENCOUNTER — OFFICE VISIT (OUTPATIENT)
Dept: SURGERY | Facility: CLINIC | Age: 57
End: 2022-09-22
Payer: MEDICAID

## 2022-09-22 VITALS
BODY MASS INDEX: 27.53 KG/M2 | HEIGHT: 69 IN | HEART RATE: 84 BPM | SYSTOLIC BLOOD PRESSURE: 98 MMHG | WEIGHT: 185.88 LBS | DIASTOLIC BLOOD PRESSURE: 57 MMHG

## 2022-09-22 DIAGNOSIS — Z93.6 PRESENCE OF UROSTOMY: ICD-10-CM

## 2022-09-22 DIAGNOSIS — Z93.2 ILEOSTOMY IN PLACE: ICD-10-CM

## 2022-09-22 DIAGNOSIS — R19.8 HIGH OUTPUT ILEOSTOMY: ICD-10-CM

## 2022-09-22 DIAGNOSIS — Z93.2 HIGH OUTPUT ILEOSTOMY: ICD-10-CM

## 2022-09-22 DIAGNOSIS — R19.7 DIARRHEA, UNSPECIFIED TYPE: Primary | ICD-10-CM

## 2022-09-22 PROCEDURE — 99999 PR PBB SHADOW E&M-EST. PATIENT-LVL III: ICD-10-PCS | Mod: PBBFAC,,, | Performed by: NURSE PRACTITIONER

## 2022-09-22 PROCEDURE — 99213 OFFICE O/P EST LOW 20 MIN: CPT | Mod: S$PBB,,, | Performed by: NURSE PRACTITIONER

## 2022-09-22 PROCEDURE — 99999 PR PBB SHADOW E&M-EST. PATIENT-LVL III: CPT | Mod: PBBFAC,,, | Performed by: NURSE PRACTITIONER

## 2022-09-22 PROCEDURE — 3078F PR MOST RECENT DIASTOLIC BLOOD PRESSURE < 80 MM HG: ICD-10-PCS | Mod: CPTII,,, | Performed by: NURSE PRACTITIONER

## 2022-09-22 PROCEDURE — 1160F RVW MEDS BY RX/DR IN RCRD: CPT | Mod: CPTII,,, | Performed by: NURSE PRACTITIONER

## 2022-09-22 PROCEDURE — 3074F SYST BP LT 130 MM HG: CPT | Mod: CPTII,,, | Performed by: NURSE PRACTITIONER

## 2022-09-22 PROCEDURE — 1111F PR DISCHARGE MEDS RECONCILED W/ CURRENT OUTPATIENT MED LIST: ICD-10-PCS | Mod: CPTII,,, | Performed by: NURSE PRACTITIONER

## 2022-09-22 PROCEDURE — 1111F DSCHRG MED/CURRENT MED MERGE: CPT | Mod: CPTII,,, | Performed by: NURSE PRACTITIONER

## 2022-09-22 PROCEDURE — 3074F PR MOST RECENT SYSTOLIC BLOOD PRESSURE < 130 MM HG: ICD-10-PCS | Mod: CPTII,,, | Performed by: NURSE PRACTITIONER

## 2022-09-22 PROCEDURE — 1159F MED LIST DOCD IN RCRD: CPT | Mod: CPTII,,, | Performed by: NURSE PRACTITIONER

## 2022-09-22 PROCEDURE — 99213 OFFICE O/P EST LOW 20 MIN: CPT | Mod: PBBFAC | Performed by: NURSE PRACTITIONER

## 2022-09-22 PROCEDURE — 1160F PR REVIEW ALL MEDS BY PRESCRIBER/CLIN PHARMACIST DOCUMENTED: ICD-10-PCS | Mod: CPTII,,, | Performed by: NURSE PRACTITIONER

## 2022-09-22 PROCEDURE — 3008F PR BODY MASS INDEX (BMI) DOCUMENTED: ICD-10-PCS | Mod: CPTII,,, | Performed by: NURSE PRACTITIONER

## 2022-09-22 PROCEDURE — 99213 PR OFFICE/OUTPT VISIT, EST, LEVL III, 20-29 MIN: ICD-10-PCS | Mod: S$PBB,,, | Performed by: NURSE PRACTITIONER

## 2022-09-22 PROCEDURE — 1159F PR MEDICATION LIST DOCUMENTED IN MEDICAL RECORD: ICD-10-PCS | Mod: CPTII,,, | Performed by: NURSE PRACTITIONER

## 2022-09-22 PROCEDURE — 3078F DIAST BP <80 MM HG: CPT | Mod: CPTII,,, | Performed by: NURSE PRACTITIONER

## 2022-09-22 PROCEDURE — 3008F BODY MASS INDEX DOCD: CPT | Mod: CPTII,,, | Performed by: NURSE PRACTITIONER

## 2022-09-22 RX ORDER — LOPERAMIDE HCL 2 MG
2 TABLET ORAL 4 TIMES DAILY PRN
Qty: 120 TABLET | Refills: 0 | Status: SHIPPED | OUTPATIENT
Start: 2022-09-22 | End: 2022-12-21

## 2022-09-22 NOTE — PROGRESS NOTES
Subjective:       Patient ID: Edita Riley is a 57 y.o. female.    Chief Complaint: No chief complaint on file.    9/22/22  She presents today for ostomy care for ileostomy and urostomy. Her  is with her as he assists in ostomy care.  Most difficulty with ileostomy. Having liquid high output from ileostomy making pouching difficult. Going through many bags.  Having to change ileostomy pouch daily d/t leaks.     No complaints with urostomy bag.     Ran out of pouches so presented to ED. Given flat bags. Going through multiple per day.   Out of bags. Has shipment coming in 5 days.       5/2021 HPI  This is a first face to face with her and spouse since pre op with me, she has an ileostomy and urostomy since last year and HAS NOT been her due to many readmits . She is still having issues with her pouches leaking and so they came today for help , they use [facility] for care needs  She comes in w/c , she also has bilateral nephrostomy tubes now as well       Review of Systems   Constitutional:  Negative for activity change and fever.   HENT: Negative.     Respiratory: Negative.     Cardiovascular: Negative.    Gastrointestinal:  Negative for blood in stool.   Genitourinary:         Bilateral neph tubes and urine bit cloudy    Integumentary:  Negative for wound.       Objective:      Physical Exam  Constitutional:       Appearance: Normal appearance.   Pulmonary:      Effort: Pulmonary effort is normal. No respiratory distress.   Abdominal:      General: Abdomen is flat. Bowel sounds are normal.      Palpations: Abdomen is soft.   Musculoskeletal:         General: Normal range of motion.   Skin:     Findings: No rash.         5/21 9/22    Applied soft convex with ring given oval shape to ileostomy despite pulling up.   APPLIED DEEP CONVEX COLOPLAST 22 MM CUT TO THIS AND SHOULD HAVE BETTER WEAR WITH THIS    Assessment:       1. Diarrhea, unspecified type    2. Ileostomy in place    3. Presence  of urostomy    4. High output ileostomy        Plan:       Discussed imodium prn. Will start with 2 pills nightly to help thicken stools.  Needs to use barrier spray/wipes. Has not previously used. Provided with many packs of wipes today.   Needs convex pouches.   Soft convex and ring for 25 mm high output ileostomy with diarrhea.  Deep convex for 22 mm flat urostomy  New rx sent to [facility] requesting 30 ileostomy pouches/month given difficulty pouching d/t high output and diarrhea.  I have reviewed the plan of care with the patient and/ or caregiver and they express understanding. I spent over 50% of this 40 minute visit in face to face counseling.

## 2022-09-24 ENCOUNTER — HOSPITAL ENCOUNTER (EMERGENCY)
Facility: HOSPITAL | Age: 57
Discharge: HOME | End: 2022-09-25
Attending: EMERGENCY MEDICINE
Payer: MEDICAID

## 2022-09-24 DIAGNOSIS — Z71.89 ENCOUNTER FOR OSTOMY CARE EDUCATION: Primary | ICD-10-CM

## 2022-09-24 DIAGNOSIS — R19.7 DIARRHEA, UNSPECIFIED TYPE: ICD-10-CM

## 2022-09-24 DIAGNOSIS — N18.9 CHRONIC KIDNEY DISEASE, UNSPECIFIED CKD STAGE: ICD-10-CM

## 2022-09-24 LAB
ALBUMIN SERPL BCP-MCNC: 3.8 G/DL (ref 3.5–5.2)
ALP SERPL-CCNC: 172 U/L (ref 55–135)
ALT SERPL W/O P-5'-P-CCNC: 17 U/L (ref 10–44)
ANION GAP SERPL CALC-SCNC: 11 MMOL/L (ref 8–16)
AST SERPL-CCNC: 16 U/L (ref 10–40)
BASOPHILS # BLD AUTO: 0.03 K/UL (ref 0–0.2)
BASOPHILS NFR BLD: 0.3 % (ref 0–1.9)
BILIRUB SERPL-MCNC: 0.2 MG/DL (ref 0.1–1)
BUN SERPL-MCNC: 20 MG/DL (ref 6–20)
CALCIUM SERPL-MCNC: 10.1 MG/DL (ref 8.7–10.5)
CHLORIDE SERPL-SCNC: 108 MMOL/L (ref 95–110)
CO2 SERPL-SCNC: 22 MMOL/L (ref 23–29)
CREAT SERPL-MCNC: 1.5 MG/DL (ref 0.5–1.4)
DIFFERENTIAL METHOD: ABNORMAL
EOSINOPHIL # BLD AUTO: 0.2 K/UL (ref 0–0.5)
EOSINOPHIL NFR BLD: 2.4 % (ref 0–8)
ERYTHROCYTE [DISTWIDTH] IN BLOOD BY AUTOMATED COUNT: 14.7 % (ref 11.5–14.5)
EST. GFR  (NO RACE VARIABLE): 39.9 ML/MIN/1.73 M^2
GLUCOSE SERPL-MCNC: 83 MG/DL (ref 70–110)
HCT VFR BLD AUTO: 42.6 % (ref 37–48.5)
HGB BLD-MCNC: 12.7 G/DL (ref 12–16)
IMM GRANULOCYTES # BLD AUTO: 0.02 K/UL (ref 0–0.04)
IMM GRANULOCYTES NFR BLD AUTO: 0.2 % (ref 0–0.5)
LYMPHOCYTES # BLD AUTO: 1.6 K/UL (ref 1–4.8)
LYMPHOCYTES NFR BLD: 19 % (ref 18–48)
MCH RBC QN AUTO: 27 PG (ref 27–31)
MCHC RBC AUTO-ENTMCNC: 29.8 G/DL (ref 32–36)
MCV RBC AUTO: 90 FL (ref 82–98)
MONOCYTES # BLD AUTO: 0.9 K/UL (ref 0.3–1)
MONOCYTES NFR BLD: 10.9 % (ref 4–15)
NEUTROPHILS # BLD AUTO: 5.8 K/UL (ref 1.8–7.7)
NEUTROPHILS NFR BLD: 67.2 % (ref 38–73)
NRBC BLD-RTO: 0 /100 WBC
PLATELET # BLD AUTO: 262 K/UL (ref 150–450)
PMV BLD AUTO: 10.2 FL (ref 9.2–12.9)
POTASSIUM SERPL-SCNC: 3.9 MMOL/L (ref 3.5–5.1)
PROT SERPL-MCNC: 8.4 G/DL (ref 6–8.4)
RBC # BLD AUTO: 4.71 M/UL (ref 4–5.4)
SODIUM SERPL-SCNC: 141 MMOL/L (ref 136–145)
WBC # BLD AUTO: 8.64 K/UL (ref 3.9–12.7)

## 2022-09-24 PROCEDURE — 99284 EMERGENCY DEPT VISIT MOD MDM: CPT | Mod: ,,, | Performed by: PHYSICIAN ASSISTANT

## 2022-09-24 PROCEDURE — 87449 NOS EACH ORGANISM AG IA: CPT | Performed by: PHYSICIAN ASSISTANT

## 2022-09-24 PROCEDURE — 87045 FECES CULTURE AEROBIC BACT: CPT | Performed by: PHYSICIAN ASSISTANT

## 2022-09-24 PROCEDURE — 99284 PR EMERGENCY DEPT VISIT,LEVEL IV: ICD-10-PCS | Mod: ,,, | Performed by: PHYSICIAN ASSISTANT

## 2022-09-24 PROCEDURE — 96360 HYDRATION IV INFUSION INIT: CPT

## 2022-09-24 PROCEDURE — 80053 COMPREHEN METABOLIC PANEL: CPT | Performed by: PHYSICIAN ASSISTANT

## 2022-09-24 PROCEDURE — 87427 SHIGA-LIKE TOXIN AG IA: CPT | Performed by: PHYSICIAN ASSISTANT

## 2022-09-24 PROCEDURE — 63600175 PHARM REV CODE 636 W HCPCS: Performed by: PHYSICIAN ASSISTANT

## 2022-09-24 PROCEDURE — 99284 EMERGENCY DEPT VISIT MOD MDM: CPT | Mod: 25

## 2022-09-24 PROCEDURE — 85025 COMPLETE CBC W/AUTO DIFF WBC: CPT | Performed by: PHYSICIAN ASSISTANT

## 2022-09-24 PROCEDURE — 87046 STOOL CULTR AEROBIC BACT EA: CPT | Mod: 59 | Performed by: PHYSICIAN ASSISTANT

## 2022-09-24 RX ADMIN — SODIUM CHLORIDE, SODIUM LACTATE, POTASSIUM CHLORIDE, AND CALCIUM CHLORIDE 1000 ML: .6; .31; .03; .02 INJECTION, SOLUTION INTRAVENOUS at 11:09

## 2022-09-25 VITALS
OXYGEN SATURATION: 99 % | BODY MASS INDEX: 28.8 KG/M2 | WEIGHT: 195 LBS | RESPIRATION RATE: 15 BRPM | TEMPERATURE: 98 F | SYSTOLIC BLOOD PRESSURE: 105 MMHG | DIASTOLIC BLOOD PRESSURE: 55 MMHG | HEART RATE: 80 BPM

## 2022-09-25 LAB
C DIFF GDH STL QL: NEGATIVE
C DIFF TOX A+B STL QL IA: NEGATIVE

## 2022-09-25 NOTE — DISCHARGE INSTRUCTIONS
Follow-up with your colorectal surgery team for further evaluation. Continue hydrating by drinking water at home.     Follow-up with your primary care provider for further evaluation. Return to the emergency room for new, worsening, or concerning symptoms.     Future Appointments   Date Time Provider Department Center   9/28/2022  8:45 AM Chirag Russ MD [facility]

## 2022-09-25 NOTE — ED PROVIDER NOTES
Encounter Date: 9/24/2022       History     Chief Complaint   Patient presents with    Ostomy bags     Pt states she ran out of ostomy bags and her shipment hasn't come in yet. Pt states she is having pain to site due to needing to change the bag.      The history is provided by medical records and the patient. No  was used.     Edita Riley is a 57 y.o. female with medical history of cervical cancer s/p surgery and pelvic radiation, open transverse colon conduit urinary diversion on 9/11/2020 complicated by bowel perforation s/p take back for hemicolectomy and ileostomy creation (9/17/20), NL nephrostomy tubes, Hx of DVT presenting to the ED with the chief complaint of ostomy bag problem.     Reports having difficulties with her colostomy over the past 2 weeks. Reports having increased output from her bag and that it has begun leaking. Reports having pain around her ostomy site. Reports she is out of ostomy bags at home and does not have anymore replacements. Seen in the ED on 9/20 and in CRS clinic on 9/22 for ostomy bags. Colostomy has been in place for 2 years and has not had this much difficulty before. Denies fever, chest pain, SOB, cough, vomiting.     Review of patient's allergies indicates:   Allergen Reactions    Bee sting [allergen ext-venom-honey bee]      Rash      Grass pollen-bermuda, standard      rash     Past Medical History:   Diagnosis Date    Abnormal mammogram 8/25/2020    Acute deep vein thrombosis (DVT) of lower extremity 12/9/2020    Anxiety     Cervical cancer 2014    Chronic back pain     Depression     Diarrhea due to malabsorption 11/14/2018    DVT of lower extremity, bilateral 11/4/2020    Fibromyalgia     Fungemia 9/27/2020    Generalized abdominal pain 8/25/2020    GERD (gastroesophageal reflux disease)     Hemifacial spasm 9/16/2015    Hiatal hernia 2014    History of cervical cancer 10/11/2018    Hx of psychiatric care     Cymbalolivia trazodone     Hypertension     Hypomagnesemia 11/21/2018    Lactose intolerance     Metastatic squamous cell carcinoma to lymph node 10/11/2018    Nephrostomy tube displaced     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 drug 11/14/2018    Osteoarthritis of back     Peritonitis 9/22/2020    Pseudomonas urinary tract infection 4/21/2021    Psychiatric problem     Schatzki's ring 9/14/2015    Seen on outside EGD 05/2014, underwent esophageal dilatation. Bx were negative.     Stroke 1972    Urinary tract infection associated with nephrostomy catheter 6/11/2020    Wound infection after surgery 9/24/2020     Past Surgical History:   Procedure Laterality Date    ANTEGRADE NEPHROSTOGRAPHY Bilateral 9/28/2020    Procedure: Nephrostogram - antegrade;  Surgeon: Leslie Balbuena MD;  Location: [facility] OR [address] [place] [address];  Service: Urology;  Laterality: Bilateral;    ANTEGRADE NEPHROSTOGRAPHY Left 4/20/2021    Procedure: NEPHROSTOGRAM, ANTEGRADE;  Surgeon: Leslie Balbuena MD;  Location: [facility] OR [address] [place] [address];  Service: Urology;  Laterality: Left;    BILATERAL OOPHORECTOMY  2015    CHOLECYSTECTOMY  11/09/2016    Done at [facility], path showed chronic cholecystitis and gallstones    cold knife conization  2014    COLECTOMY, RIGHT  9/17/2020    Procedure: COLECTOMY, RIGHT Extended;  Surgeon: Hammad Ryenolds MD;  Location: [facility] OR 2ND FLR;  Service: Colon and Rectal;;    COLONOSCOPY  2014    COLONOSCOPY N/A 10/24/2016    at [facility], Dr Gutiérrez    COLONOSCOPY N/A 5/18/2018    Procedure: COLONOSCOPY;  Surgeon: Arden Gutiérrez MD;  Location: [facility] (4TH FLR);  Service: Endoscopy;  Laterality: N/A;    COLONOSCOPY N/A 7/28/2020    Procedure: COLONOSCOPY;  Surgeon: Hammad Reynolds MD;  Location: [facility] ENDO (4TH FLR);  Service: Colon and Rectal;  Laterality: N/A;  covid test [place] 7/25    CYSTOSCOPY WITH URETEROSCOPY, RETROGRADE PYELOGRAPHY, AND INSERTION OF STENT Bilateral 3/21/2020    Procedure: CYSTOSCOPY, WITH RETROGRADE PYELOGRAM,;  Surgeon: Leslie Balbuena MD;   Location: [facility] OR 1ST FLR;  Service: Urology;  Laterality: Bilateral;    ESOPHAGOGASTRODUODENOSCOPY  2014    ESOPHAGOGASTRODUODENOSCOPY  11/18/2020    ESOPHAGOGASTRODUODENOSCOPY N/A 11/18/2020    Procedure: ESOPHAGOGASTRODUODENOSCOPY (EGD);  Surgeon: Zenon Spencer MD;  Location: [facility] ENDO;  Service: Endoscopy;  Laterality: N/A;    ESOPHAGOGASTRODUODENOSCOPY N/A 12/11/2020    Procedure: EGD (ESOPHAGOGASTRODUODENOSCOPY);  Surgeon: Juancho Muse MD;  Location: [facility] ENDO (2ND FLR);  Service: Endoscopy;  Laterality: N/A;    HYSTERECTOMY  2014    TV for cervical cancer    ILEOSTOMY  9/17/2020    Procedure: CREATION, ILEOSTOMY;  Surgeon: Hammad Reynolds MD;  Location: [facility] OR 2ND FLR;  Service: Colon and Rectal;;    LOOPOGRAM N/A 4/20/2021    Procedure: LOOPOGRAM;  Surgeon: Leslie Balbuena MD;  Location: [facility] OR 1ST FLR;  Service: Urology;  Laterality: N/A;    MOBILIZATION OF SPLENIC FLEXURE N/A 9/11/2020    Procedure: MOBILIZATION, COLONIC;  Surgeon: Hammad Reynolds MD;  Location: [facility] OR 2ND FLR;  Service: Colon and Rectal;  Laterality: N/A;    NEPHROSTOGRAPHY Bilateral 4/17/2021    Procedure: Nephrostogram;  Surgeon: Celeste Surgeon;  Location: [facility];  Service: Anesthesiology;  Laterality: Bilateral;    OOPHORECTOMY      RETROPERITONEAL LYMPHADENECTOMY Right 9/17/2018    Procedure: LYMPHADENECTOMY, RETROPERITONEUM;  Surgeon: eSbas Reed MD;  Location: [facility] OR 2ND FLR;  Service: General;  Laterality: Right;  ILIAC     Family History   Problem Relation Age of Onset    Heart disease Sister     Heart disease Maternal Grandmother     Colon cancer Father     Esophageal cancer Father     Cancer Father         Lung-smoker    Cancer Mother         Cervical    Cervical cancer Mother     Breast cancer Maternal Aunt     Suicide Daughter         jumped from parking structure    Drug abuse Daughter     Drug abuse Daughter     Rectal cancer Neg Hx     Stomach cancer Neg Hx     Crohn's disease Neg Hx     Ulcerative  colitis Neg Hx     Diabetes Neg Hx     Hypertension Neg Hx      Social History     Tobacco Use    Smoking status: Former    Smokeless tobacco: Never   Substance Use Topics    Alcohol use: No     Alcohol/week: 0.0 standard drinks    Drug use: No     Review of Systems   Constitutional:  Negative for fever.   HENT:  Negative for sore throat.    Eyes:  Negative for pain.   Respiratory:  Negative for shortness of breath.    Cardiovascular:  Negative for chest pain.   Gastrointestinal:  Positive for abdominal pain and diarrhea. Negative for nausea.   Genitourinary:  Negative for dysuria.   Musculoskeletal:  Negative for back pain.   Skin:  Negative for rash.   Neurological:  Negative for weakness.   Hematological:  Does not bruise/bleed easily.     Physical Exam     Initial Vitals [09/24/22 1923]   BP Pulse Resp Temp SpO2   116/65 98 16 97.6 °F (36.4 °C) 98 %      MAP       --         Physical Exam    Constitutional: She appears well-developed and well-nourished. She is not diaphoretic. No distress.   HENT:   Head: Normocephalic and atraumatic.   Mouth/Throat: Oropharynx is clear and moist. No oropharyngeal exudate.   Eyes: Conjunctivae and EOM are normal. Pupils are equal, round, and reactive to light. No scleral icterus.   Neck: Neck supple.   Normal range of motion.  Cardiovascular:  Normal rate and regular rhythm.           Pulmonary/Chest: Breath sounds normal. No respiratory distress. She has no wheezes.   Abdominal: Abdomen is soft. She exhibits no distension. There is no abdominal tenderness.   Colostomy in RLQ with detached ostomy bag. Yellow stool leaking from site and on her clothing. Urostomy in LLQ. BL Nephrostomy tubes in place with disrupted overlying dressings.  There is no rebound.   Musculoskeletal:         General: No tenderness or edema. Normal range of motion.      Cervical back: Normal range of motion and neck supple.     Neurological: She is alert and oriented to person, place, and time. She has  normal strength. No sensory deficit.   Skin: Skin is warm and dry. No rash noted. No erythema.   Psychiatric: She has a normal mood and affect.       ED Course   Procedures  Labs Reviewed   CBC W/ AUTO DIFFERENTIAL - Abnormal; Notable for the following components:       Result Value    MCHC 29.8 (*)     RDW 14.7 (*)     All other components within normal limits   COMPREHENSIVE METABOLIC PANEL - Abnormal; Notable for the following components:    CO2 22 (*)     Creatinine 1.5 (*)     Alkaline Phosphatase 172 (*)     eGFR 39.9 (*)     All other components within normal limits   CULTURE, STOOL   CLOSTRIDIUM DIFFICILE   ENTEROHEMORRHAGIC E.COLI   HIV 1 / 2 ANTIBODY   HEPATITIS C ANTIBODY          Imaging Results    None          Medications   lactated ringers bolus 1,000 mL (0 mLs Intravenous Stopped 9/25/22 0014)     Medical Decision Making:   History:   Old Medical Records: I decided to obtain old medical records.  Old Records Summarized: records from clinic visits and records from previous admission(s).  Clinical Tests:   Lab Tests: Ordered and Reviewed     APC / Resident Notes:   57 y.o. female with medical history of cervical cancer s/p surgery and pelvic radiation, open transverse colon conduit urinary diversion on 9/11/2020 complicated by bowel perforation s/p take back for hemicolectomy and ileostomy creation (9/17/20), NL nephrostomy tubes, Hx of DVT presenting to the ED c/o ostomy bag problem. Reports difficulties with ostomy bag leaking and having increased stool output for the past 2 weeks. DDx includes but not limited to colostomy care, stoma dermatitis, cellulitis, infectious diarrhea.    Patient's clothes changed into clean scrubs. Ostomy bag replaced and she was provided 5 additional bags for replacement. Nephrostomy tube dressing changed additionally. Labs show mild Crt elevated 1.5, baseline 1.2. IVF administered. Stool cultures and C diff sent given her diarrhea. Advised outpatient follow-up with PCP  and CRS. Patient expresses understanding and agreeable to the plan. Return to ED precautions given for new, worsening, or concerning symptoms. I have discussed the care of this patient with my supervising physician.                    Clinical Impression:   Final diagnoses:  [Z71.89] Encounter for ostomy care education (Primary)  [R19.7] Diarrhea, unspecified type  [N18.9] Chronic kidney disease, unspecified CKD stage      ED Disposition Condition    Discharge Stable          ED Prescriptions    None       Follow-up Information       Follow up With Specialties Details Why Contact Info Additional Information    [facility] Colon and Rectal Surgery   [address]  [facility] [address]  [phone] [facility] 4[facility] Floor Please park in [facility] and use [facility] elevator             Hammad Roa PA-C  09/25/22 0017

## 2022-09-28 ENCOUNTER — OFFICE VISIT (OUTPATIENT)
Dept: UROLOGY | Facility: CLINIC | Age: 57
End: 2022-09-28
Payer: MEDICAID

## 2022-09-28 VITALS
HEART RATE: 80 BPM | WEIGHT: 196 LBS | DIASTOLIC BLOOD PRESSURE: 71 MMHG | BODY MASS INDEX: 29.03 KG/M2 | HEIGHT: 69 IN | SYSTOLIC BLOOD PRESSURE: 102 MMHG

## 2022-09-28 DIAGNOSIS — N20.1 RIGHT URETERAL STONE: ICD-10-CM

## 2022-09-28 DIAGNOSIS — N20.0 KIDNEY STONES: ICD-10-CM

## 2022-09-28 DIAGNOSIS — N20.1 RIGHT URETERAL STONE: Primary | ICD-10-CM

## 2022-09-28 DIAGNOSIS — N20.0 KIDNEY STONES: Primary | ICD-10-CM

## 2022-09-28 LAB
BACTERIA STL CULT: NORMAL
BACTERIA STL CULT: NORMAL

## 2022-09-28 PROCEDURE — 3008F BODY MASS INDEX DOCD: CPT | Mod: CPTII,,, | Performed by: UROLOGY

## 2022-09-28 PROCEDURE — 99999 PR PBB SHADOW E&M-EST. PATIENT-LVL III: CPT | Mod: PBBFAC,,, | Performed by: UROLOGY

## 2022-09-28 PROCEDURE — 1159F PR MEDICATION LIST DOCUMENTED IN MEDICAL RECORD: ICD-10-PCS | Mod: CPTII,,, | Performed by: UROLOGY

## 2022-09-28 PROCEDURE — 1111F PR DISCHARGE MEDS RECONCILED W/ CURRENT OUTPATIENT MED LIST: ICD-10-PCS | Mod: CPTII,,, | Performed by: UROLOGY

## 2022-09-28 PROCEDURE — 99999 PR PBB SHADOW E&M-EST. PATIENT-LVL III: ICD-10-PCS | Mod: PBBFAC,,, | Performed by: UROLOGY

## 2022-09-28 PROCEDURE — 3078F PR MOST RECENT DIASTOLIC BLOOD PRESSURE < 80 MM HG: ICD-10-PCS | Mod: CPTII,,, | Performed by: UROLOGY

## 2022-09-28 PROCEDURE — 3074F PR MOST RECENT SYSTOLIC BLOOD PRESSURE < 130 MM HG: ICD-10-PCS | Mod: CPTII,,, | Performed by: UROLOGY

## 2022-09-28 PROCEDURE — 99213 OFFICE O/P EST LOW 20 MIN: CPT | Mod: PBBFAC | Performed by: UROLOGY

## 2022-09-28 PROCEDURE — 99214 PR OFFICE/OUTPT VISIT, EST, LEVL IV, 30-39 MIN: ICD-10-PCS | Mod: S$PBB,,, | Performed by: UROLOGY

## 2022-09-28 PROCEDURE — 99214 OFFICE O/P EST MOD 30 MIN: CPT | Mod: S$PBB,,, | Performed by: UROLOGY

## 2022-09-28 PROCEDURE — 3008F PR BODY MASS INDEX (BMI) DOCUMENTED: ICD-10-PCS | Mod: CPTII,,, | Performed by: UROLOGY

## 2022-09-28 PROCEDURE — 1111F DSCHRG MED/CURRENT MED MERGE: CPT | Mod: CPTII,,, | Performed by: UROLOGY

## 2022-09-28 PROCEDURE — 3078F DIAST BP <80 MM HG: CPT | Mod: CPTII,,, | Performed by: UROLOGY

## 2022-09-28 PROCEDURE — 1160F PR REVIEW ALL MEDS BY PRESCRIBER/CLIN PHARMACIST DOCUMENTED: ICD-10-PCS | Mod: CPTII,,, | Performed by: UROLOGY

## 2022-09-28 PROCEDURE — 1160F RVW MEDS BY RX/DR IN RCRD: CPT | Mod: CPTII,,, | Performed by: UROLOGY

## 2022-09-28 PROCEDURE — 87086 URINE CULTURE/COLONY COUNT: CPT | Performed by: UROLOGY

## 2022-09-28 PROCEDURE — 1159F MED LIST DOCD IN RCRD: CPT | Mod: CPTII,,, | Performed by: UROLOGY

## 2022-09-28 PROCEDURE — 3074F SYST BP LT 130 MM HG: CPT | Mod: CPTII,,, | Performed by: UROLOGY

## 2022-09-28 NOTE — PROGRESS NOTES
Subjective:       Patient ID: Edita Riley is a 57 y.o. female.    Chief Complaint:  kidney stone management    History of Present Illness  HPI  Edita Riley is a 57 y.o. female who has a history of XRT for cervical cancer.  She had right flank pain and ODESSA.  She had right hydronephrosis due to radiation changes to the bladder and because this occurred in the very early days of the pandemic (March 2020), she had bilateral ureteral stents placed.  She was later found to have developed bilateral ureteral obstruction and underwent a colon conduit.  This was complicated by bowel perforation away from the anastomosis.       She was latera found to have bilateral hydronephrosis and had bilateral percutaneous nephrostomy tubes.  A loopogram showed reflux on the right and also the left side but the left was slower to reflux and there was a segment of the left ureter that was sluggish to drain.       She has since been managed with percutaneous nephrostomy tube changes since April of 2021.  Recently presented to the hospital and underwent CT scan which was independently reviewed today and shared with the patient.  This revealed to right renal stones as well as a right proximal ureteral stone.  She underwent bilateral nephrostomy tube exchanges on 8/29/22 with Interventional Radiology.    She is without complaints today.  No fevers or chills.  Has a history of Klebsiella positive urine culture recently but not currently on antibiotics.  Denies any blood thinning medications.          Review of Systems  Review of Systems  All other systems reviewed and negative except pertinent positives noted in HPI.       Objective:     Physical Exam  Constitutional:       General: She is not in acute distress.     Appearance: She is well-developed.   HENT:      Head: Normocephalic and atraumatic.   Eyes:      General: No scleral icterus.  Neck:      Trachea: No tracheal deviation.   Pulmonary:      Effort:  Pulmonary effort is normal. No respiratory distress.   Abdominal:       Skin:         Neurological:      Mental Status: She is alert and oriented to person, place, and time.   Psychiatric:         Behavior: Behavior normal.         Thought Content: Thought content normal.         Judgment: Judgment normal.       Lab Review  Lab Results   Component Value Date    COLORU Orange (A) 08/26/2022    SPECGRAV 1.010 08/26/2022    PHUR 7.0 08/26/2022    NITRITE Negative 08/26/2022    KETONESU Negative 08/26/2022    UROBILINOGEN 4.0 04/10/2018         Assessment:        1. Right ureteral stone    2. Kidney stones            Plan:     Right ureteral stone  -     Urine culture    Kidney stones      -urine culture from right nephrostomy tube today  -defer management of urinary drainage to Dr. Balbuena  -plan for right PCNL/antegrade ureteroscopy in C2 after preop appointments    I have explained the indication, risks, benefits, and alternatives of the procedure in detail.  The patient voices understanding and all questions have been answered.   Risks including but not limited to bleeding, infection, injury to the lung, liver, spleen, colon, need for additional procedures, incomplete removal of stone, arteriovenous malformation, pseudoaneurysm, hydrothorax or pneumothorax, urinoma, ureteral injury or perforation, DVT, PE, heart attack, stroke, and death were discussed with the patient in depth.  The patient agrees to proceed as planned with right PCNL, antegrade right ureteroscopy.

## 2022-09-29 ENCOUNTER — PATIENT OUTREACH (OUTPATIENT)
Dept: EMERGENCY MEDICINE | Facility: HOSPITAL | Age: 57
End: 2022-09-29
Payer: MEDICAID

## 2022-09-29 LAB
BACTERIA UR CULT: NORMAL
BACTERIA UR CULT: NORMAL

## 2022-10-02 NOTE — PROGRESS NOTES
Urology ([facility]) H&P  Staff: Chirag Russ MD    CC: right nephrolithiasis    HPI:  Edita Riley is a 57 y.o. female with nephrolithiasis. She has a history of XRT for cervical cancer, bilateral ureteral obstruction s/p colon conduit.  This was complicated by bowel perforation away from the anastomosis. Now with bilateral percutaneous nephrostomy tubes since Apr 2021. Last exchange 8/29.   Recently CT revealed to right renal stones as well as a right proximal ureteral stone.      She is without complaints today.  No fevers or chills.  Has a history of Klebsiella positive urine culture recently but not currently on antibiotics.  Denies any blood thinning medications.    ROS:  Neg except per HPI    Past Medical History:   Diagnosis Date    Abnormal mammogram 8/25/2020    Acute deep vein thrombosis (DVT) of lower extremity 12/9/2020    Anxiety     Cervical cancer 2014    Chronic back pain     Depression     Diarrhea due to malabsorption 11/14/2018    DVT of lower extremity, bilateral 11/4/2020    Fibromyalgia     Fungemia 9/27/2020    Generalized abdominal pain 8/25/2020    GERD (gastroesophageal reflux disease)     Hemifacial spasm 9/16/2015    Hiatal hernia 2014    History of cervical cancer 10/11/2018    Hx of psychiatric care     Cymbalta, trazodone    Hypertension     Hypomagnesemia 11/21/2018    Lactose intolerance     Metastatic squamous cell carcinoma to lymph node 10/11/2018    Nephrostomy tube displaced     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 drug 11/14/2018    Osteoarthritis of back     Peritonitis 9/22/2020    Pseudomonas urinary tract infection 4/21/2021    Psychiatric problem     Schatzki's ring 9/14/2015    Seen on outside EGD 05/2014, underwent esophageal dilatation. Bx were negative.     Stroke 1972    Urinary tract infection associated with nephrostomy catheter 6/11/2020    Wound infection after surgery 9/24/2020       Past Surgical History:   Procedure Laterality Date    ANTEGRADE  NEPHROSTOGRAPHY Bilateral 9/28/2020    Procedure: Nephrostogram - antegrade;  Surgeon: Leslie Balbuena MD;  Location: [facility] OR 1ST FLR;  Service: Urology;  Laterality: Bilateral;    ANTEGRADE NEPHROSTOGRAPHY Left 4/20/2021    Procedure: NEPHROSTOGRAM, ANTEGRADE;  Surgeon: Leslie Balbuena MD;  Location: [facility] OR 1ST FLR;  Service: Urology;  Laterality: Left;    BILATERAL OOPHORECTOMY  2015    CHOLECYSTECTOMY  11/09/2016    Done at [facility], path showed chronic cholecystitis and gallstones    cold knife conization  2014    COLECTOMY, RIGHT  9/17/2020    Procedure: COLECTOMY, RIGHT Extended;  Surgeon: Hammad Reynolds MD;  Location: [facility] OR 2ND FLR;  Service: Colon and Rectal;;    COLONOSCOPY  2014    COLONOSCOPY N/A 10/24/2016    at [facility], Dr Gutiérrez    COLONOSCOPY N/A 5/18/2018    Procedure: COLONOSCOPY;  Surgeon: Arden Gutiérrez MD;  Location: [facility] (4TH FLR);  Service: Endoscopy;  Laterality: N/A;    COLONOSCOPY N/A 7/28/2020    Procedure: COLONOSCOPY;  Surgeon: Hammad Reynolds MD;  Location: [facility] (4TH FLR);  Service: Colon and Rectal;  Laterality: N/A;  [place]    CYSTOSCOPY WITH URETEROSCOPY, RETROGRADE PYELOGRAPHY, AND INSERTION OF STENT Bilateral 3/21/2020    Procedure: CYSTOSCOPY, WITH RETROGRADE PYELOGRAM,;  Surgeon: Leslie Balbuena MD;  Location: [facility] OR 1ST FLR;  Service: Urology;  Laterality: Bilateral;    ESOPHAGOGASTRODUODENOSCOPY  2014    ESOPHAGOGASTRODUODENOSCOPY  11/18/2020    ESOPHAGOGASTRODUODENOSCOPY N/A 11/18/2020    Procedure: ESOPHAGOGASTRODUODENOSCOPY (EGD);  Surgeon: Zenon Spencer MD;  Location: [facility];  Service: Endoscopy;  Laterality: N/A;    ESOPHAGOGASTRODUODENOSCOPY N/A 12/11/2020    Procedure: EGD (ESOPHAGOGASTRODUODENOSCOPY);  Surgeon: Juancho Muse MD;  Location: [facility] ENDO (2ND FLR);  Service: Endoscopy;  Laterality: N/A;    HYSTERECTOMY  2014    [facility] for cervical cancer    ILEOSTOMY  9/17/2020    Procedure: CREATION, ILEOSTOMY;  Surgeon:  Hammad Reynolds MD;  Location: [facility] OR 2ND FLR;  Service: Colon and Rectal;;    LOOPOGRAM N/A 2021    Procedure: LOOPOGRAM;  Surgeon: Leslie Balbuena MD;  Location: [facility] OR 1ST FLR;  Service: Urology;  Laterality: N/A;    MOBILIZATION OF SPLENIC FLEXURE N/A 2020    Procedure: MOBILIZATION, COLONIC;  Surgeon: Hammad Reynolds MD;  Location: [facility] OR 2ND FLR;  Service: Colon and Rectal;  Laterality: N/A;    NEPHROSTOGRAPHY Bilateral 2021    Procedure: Nephrostogram;  Surgeon: Celeste Surgeon;  Location: [facility] CELESTE;  Service: Anesthesiology;  Laterality: Bilateral;    OOPHORECTOMY      RETROPERITONEAL LYMPHADENECTOMY Right 2018    Procedure: LYMPHADENECTOMY, RETROPERITONEUM;  Surgeon: Sebas Reed MD;  Location: [facility] OR 2ND FLR;  Service: General;  Laterality: Right;  ILIAC       Social History     Socioeconomic History    Marital status:      Spouse name: Hammad    Number of children: 2   Tobacco Use    Smoking status: Former    Smokeless tobacco: Never   Substance and Sexual Activity    Alcohol use: No     Alcohol/week: 0.0 standard drinks    Drug use: No    Sexual activity: Yes     Partners: Male     Birth control/protection: None     Comment:  19 years since    Social History Narrative    , twin daughters (1  2018), disabled due to childhood stroke, Buddhism sophia     Social Determinants of Health     Financial Resource Strain: Low Risk     Difficulty of Paying Living Expenses: Not very hard   Food Insecurity: No Food Insecurity    Worried About Running Out of Food in the Last Year: Never true    Ran Out of Food in the Last Year: Never true   Transportation Needs: No Transportation Needs    Lack of Transportation (Medical): No    Lack of Transportation (Non-Medical): No   Physical Activity: Inactive    Days of Exercise per Week: 0 days    Minutes of Exercise per Session: 0 min   Stress: No Stress Concern Present    Feeling of Stress : Only a little    Social Connections: Moderately Isolated    Frequency of Communication with Friends and Family: More than three times a week    Frequency of Social Gatherings with Friends and Family: More than three times a week    Attends Jehovah's witness Services: Never    Active Member of Clubs or Organizations: No    Attends Club or Organization Meetings: Never    Marital Status:    Housing Stability: Low Risk     Unable to Pay for Housing in the Last Year: No    Number of Places Lived in the Last Year: 1    Unstable Housing in the Last Year: No       Family History   Problem Relation Age of Onset    Heart disease Sister     Heart disease Maternal Grandmother     Colon cancer Father     Esophageal cancer Father     Cancer Father         Lung-smoker    Cancer Mother         Cervical    Cervical cancer Mother     Breast cancer Maternal Aunt     Suicide Daughter         jumped from parking structure    Drug abuse Daughter     Drug abuse Daughter     Rectal cancer Neg Hx     Stomach cancer Neg Hx     Crohn's disease Neg Hx     Ulcerative colitis Neg Hx     Diabetes Neg Hx     Hypertension Neg Hx        Review of patient's allergies indicates:   Allergen Reactions    Bee sting [allergen ext-venom-honey bee]      Rash      Grass pollen-bermuda, standard      rash       Current Outpatient Medications on File Prior to Visit   Medication Sig Dispense Refill    acetaminophen (TYLENOL) 325 MG tablet Take 2 tablets (650 mg total) by mouth 3 (three) times daily.  0    gabapentin (NEURONTIN) 100 MG capsule Take 2 capsules (200 mg total) by mouth every evening. 90 capsule 3    ketorolac (TORADOL) 10 mg tablet Take 1 tablet (10 mg total) by mouth every 6 (six) hours as needed for Pain. 30 tablet 0    loperamide (IMODIUM A-D) 2 mg Tab Take 1 tablet (2 mg total) by mouth 4 (four) times daily as needed (diarrhea). 120 tablet 0    melatonin (MELATIN) 3 mg tablet Take 2 tablets (6 mg total) by mouth nightly as needed for Insomnia. 60 tablet 0     mirtazapine (REMERON SOL-TAB) 15 MG disintegrating tablet Dissolve 1 tablet (15 mg total) by mouth nightly. 30 tablet 11    sodium bicarbonate 650 MG tablet Take 2 tablets (1,300 mg total) by mouth 2 (two) times daily. 120 tablet 11    sucralfate (CARAFATE) 1 gram tablet Take 1 tablet (1 g total) by mouth 4 (four) times daily before meals and nightly. 120 tablet 2     No current facility-administered medications on file prior to visit.       Anticoagulation:  No    Physical Exam:  General: No acute distress, well developed. AAOx3  Head: Normocephalic, Atraumatic  Eyes: Extra-occular movements intact, No discharge  Neck: supple, symmetrical, trachea midline  Lungs: normal respiratory effort, no respiratory distress, no wheezes  CV: regular rate, 2+ pulses  Abdomen: soft, non-tender, non-distended, no organomegaly  MSK: no edema, no deformities, normal ROM  Skin: skin color, texture, turgor normal.  Neurologic: no focal deficits, sensation intact    Labs:    Lab Results   Component Value Date    WBC 8.64 09/24/2022    HGB 12.7 09/24/2022    HCT 42.6 09/24/2022    MCV 90 09/24/2022     09/24/2022       BMP  Lab Results   Component Value Date     09/24/2022    K 3.9 09/24/2022     09/24/2022    CO2 22 (L) 09/24/2022    BUN 20 09/24/2022    CREATININE 1.5 (H) 09/24/2022    CALCIUM 10.1 09/24/2022    ANIONGAP 11 09/24/2022    ESTGFRAFRICA 27.5 (A) 05/02/2022    EGFRNONAA 23.8 (A) 05/02/2022     Imaging:  CTAP 8/24/22: 4 and 7 mm right renal stones. No right hydro. 4mm proximal right ureteral stone. No left urolithiasis.    Assessment: Edita Riley is a 57 y.o. female with right nephrolithiasis    EKG without concern 8/26/22.    Plan:     1. To OR on 10/13/22 for right PCNL and antegrade URS  2. Surgery consents signed. Patient is a Yazdanism and does not consent to blood transfusion.   3. I have explained the risk, benefits, and alternatives of the procedure in detail. The patient  voices understanding and all questions have been answered. The patient agrees to proceed as planned.   4. Type and cross 2 units  5. Urine for culture from R neph tube today  6. Will prescribe 7 days of augmentin before surgery  7. Needs medicine clearance before surgery. [place] to schedule.     ANITHA MCKEON MD

## 2022-10-02 NOTE — H&P (VIEW-ONLY)
Urology ([facility]) H&P  Staff: Chirag Russ MD    CC: right nephrolithiasis    HPI:  Edita Riley is a 57 y.o. female with nephrolithiasis. She has a history of XRT for cervical cancer, bilateral ureteral obstruction s/p colon conduit.  This was complicated by bowel perforation away from the anastomosis. Now with bilateral percutaneous nephrostomy tubes since Apr 2021. Last exchange 8/29.   Recently CT revealed to right renal stones as well as a right proximal ureteral stone.      She is without complaints today.  No fevers or chills.  Has a history of Klebsiella positive urine culture recently but not currently on antibiotics.  Denies any blood thinning medications.    ROS:  Neg except per HPI    Past Medical History:   Diagnosis Date    Abnormal mammogram 8/25/2020    Acute deep vein thrombosis (DVT) of lower extremity 12/9/2020    Anxiety     Cervical cancer 2014    Chronic back pain     Depression     Diarrhea due to malabsorption 11/14/2018    DVT of lower extremity, bilateral 11/4/2020    Fibromyalgia     Fungemia 9/27/2020    Generalized abdominal pain 8/25/2020    GERD (gastroesophageal reflux disease)     Hemifacial spasm 9/16/2015    Hiatal hernia 2014    History of cervical cancer 10/11/2018    Hx of psychiatric care     Cymbalta, trazodone    Hypertension     Hypomagnesemia 11/21/2018    Lactose intolerance     Metastatic squamous cell carcinoma to lymph node 10/11/2018    Nephrostomy tube displaced     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 drug 11/14/2018    Osteoarthritis of back     Peritonitis 9/22/2020    Pseudomonas urinary tract infection 4/21/2021    Psychiatric problem     Schatzki's ring 9/14/2015    Seen on outside EGD 05/2014, underwent esophageal dilatation. Bx were negative.     Stroke 1972    Urinary tract infection associated with nephrostomy catheter 6/11/2020    Wound infection after surgery 9/24/2020       Past Surgical History:   Procedure Laterality Date    ANTEGRADE  NEPHROSTOGRAPHY Bilateral 9/28/2020    Procedure: Nephrostogram - antegrade;  Surgeon: Leslie Balbuena MD;  Location: [facility] OR 1ST FLR;  Service: Urology;  Laterality: Bilateral;    ANTEGRADE NEPHROSTOGRAPHY Left 4/20/2021    Procedure: NEPHROSTOGRAM, ANTEGRADE;  Surgeon: Leslie Balbuena MD;  Location: [facility] OR 1ST FLR;  Service: Urology;  Laterality: Left;    BILATERAL OOPHORECTOMY  2015    CHOLECYSTECTOMY  11/09/2016    Done at [facility], path showed chronic cholecystitis and gallstones    cold knife conization  2014    COLECTOMY, RIGHT  9/17/2020    Procedure: COLECTOMY, RIGHT Extended;  Surgeon: Hammad Reynolds MD;  Location: [facility] OR 2ND FLR;  Service: Colon and Rectal;;    COLONOSCOPY  2014    COLONOSCOPY N/A 10/24/2016    at [facility], Dr Gutiérrez    COLONOSCOPY N/A 5/18/2018    Procedure: COLONOSCOPY;  Surgeon: Arden Gutiérrez MD;  Location: [facility] (4TH FLR);  Service: Endoscopy;  Laterality: N/A;    COLONOSCOPY N/A 7/28/2020    Procedure: COLONOSCOPY;  Surgeon: Hammad Reynolds MD;  Location: [facility] (4TH FLR);  Service: Colon and Rectal;  Laterality: N/A;  [place]    CYSTOSCOPY WITH URETEROSCOPY, RETROGRADE PYELOGRAPHY, AND INSERTION OF STENT Bilateral 3/21/2020    Procedure: CYSTOSCOPY, WITH RETROGRADE PYELOGRAM,;  Surgeon: Leslie Balbuena MD;  Location: [facility] OR 1ST FLR;  Service: Urology;  Laterality: Bilateral;    ESOPHAGOGASTRODUODENOSCOPY  2014    ESOPHAGOGASTRODUODENOSCOPY  11/18/2020    ESOPHAGOGASTRODUODENOSCOPY N/A 11/18/2020    Procedure: ESOPHAGOGASTRODUODENOSCOPY (EGD);  Surgeon: Zenon Spencer MD;  Location: [facility];  Service: Endoscopy;  Laterality: N/A;    ESOPHAGOGASTRODUODENOSCOPY N/A 12/11/2020    Procedure: EGD (ESOPHAGOGASTRODUODENOSCOPY);  Surgeon: Juancho Muse MD;  Location: [facility] ENDO (2ND FLR);  Service: Endoscopy;  Laterality: N/A;    HYSTERECTOMY  2014    [facility] for cervical cancer    ILEOSTOMY  9/17/2020    Procedure: CREATION, ILEOSTOMY;  Surgeon:  Hammad Reynolds MD;  Location: [facility] OR 2ND FLR;  Service: Colon and Rectal;;    LOOPOGRAM N/A 2021    Procedure: LOOPOGRAM;  Surgeon: Leslie Balbuena MD;  Location: [facility] OR 1ST FLR;  Service: Urology;  Laterality: N/A;    MOBILIZATION OF SPLENIC FLEXURE N/A 2020    Procedure: MOBILIZATION, COLONIC;  Surgeon: Hammad Reynolds MD;  Location: [facility] OR 2ND FLR;  Service: Colon and Rectal;  Laterality: N/A;    NEPHROSTOGRAPHY Bilateral 2021    Procedure: Nephrostogram;  Surgeon: Celeste Surgeon;  Location: [facility] CELESTE;  Service: Anesthesiology;  Laterality: Bilateral;    OOPHORECTOMY      RETROPERITONEAL LYMPHADENECTOMY Right 2018    Procedure: LYMPHADENECTOMY, RETROPERITONEUM;  Surgeon: Sebas Reed MD;  Location: [facility] OR 2ND FLR;  Service: General;  Laterality: Right;  ILIAC       Social History     Socioeconomic History    Marital status:      Spouse name: Hammad    Number of children: 2   Tobacco Use    Smoking status: Former    Smokeless tobacco: Never   Substance and Sexual Activity    Alcohol use: No     Alcohol/week: 0.0 standard drinks    Drug use: No    Sexual activity: Yes     Partners: Male     Birth control/protection: None     Comment:  19 years since    Social History Narrative    , twin daughters (1  2018), disabled due to childhood stroke, Bahai sophia     Social Determinants of Health     Financial Resource Strain: Low Risk     Difficulty of Paying Living Expenses: Not very hard   Food Insecurity: No Food Insecurity    Worried About Running Out of Food in the Last Year: Never true    Ran Out of Food in the Last Year: Never true   Transportation Needs: No Transportation Needs    Lack of Transportation (Medical): No    Lack of Transportation (Non-Medical): No   Physical Activity: Inactive    Days of Exercise per Week: 0 days    Minutes of Exercise per Session: 0 min   Stress: No Stress Concern Present    Feeling of Stress : Only a little    Social Connections: Moderately Isolated    Frequency of Communication with Friends and Family: More than three times a week    Frequency of Social Gatherings with Friends and Family: More than three times a week    Attends Latter day Services: Never    Active Member of Clubs or Organizations: No    Attends Club or Organization Meetings: Never    Marital Status:    Housing Stability: Low Risk     Unable to Pay for Housing in the Last Year: No    Number of Places Lived in the Last Year: 1    Unstable Housing in the Last Year: No       Family History   Problem Relation Age of Onset    Heart disease Sister     Heart disease Maternal Grandmother     Colon cancer Father     Esophageal cancer Father     Cancer Father         Lung-smoker    Cancer Mother         Cervical    Cervical cancer Mother     Breast cancer Maternal Aunt     Suicide Daughter         jumped from parking structure    Drug abuse Daughter     Drug abuse Daughter     Rectal cancer Neg Hx     Stomach cancer Neg Hx     Crohn's disease Neg Hx     Ulcerative colitis Neg Hx     Diabetes Neg Hx     Hypertension Neg Hx        Review of patient's allergies indicates:   Allergen Reactions    Bee sting [allergen ext-venom-honey bee]      Rash      Grass pollen-bermuda, standard      rash       Current Outpatient Medications on File Prior to Visit   Medication Sig Dispense Refill    acetaminophen (TYLENOL) 325 MG tablet Take 2 tablets (650 mg total) by mouth 3 (three) times daily.  0    gabapentin (NEURONTIN) 100 MG capsule Take 2 capsules (200 mg total) by mouth every evening. 90 capsule 3    ketorolac (TORADOL) 10 mg tablet Take 1 tablet (10 mg total) by mouth every 6 (six) hours as needed for Pain. 30 tablet 0    loperamide (IMODIUM A-D) 2 mg Tab Take 1 tablet (2 mg total) by mouth 4 (four) times daily as needed (diarrhea). 120 tablet 0    melatonin (MELATIN) 3 mg tablet Take 2 tablets (6 mg total) by mouth nightly as needed for Insomnia. 60 tablet 0     mirtazapine (REMERON SOL-TAB) 15 MG disintegrating tablet Dissolve 1 tablet (15 mg total) by mouth nightly. 30 tablet 11    sodium bicarbonate 650 MG tablet Take 2 tablets (1,300 mg total) by mouth 2 (two) times daily. 120 tablet 11    sucralfate (CARAFATE) 1 gram tablet Take 1 tablet (1 g total) by mouth 4 (four) times daily before meals and nightly. 120 tablet 2     No current facility-administered medications on file prior to visit.       Anticoagulation:  No    Physical Exam:  General: No acute distress, well developed. AAOx3  Head: Normocephalic, Atraumatic  Eyes: Extra-occular movements intact, No discharge  Neck: supple, symmetrical, trachea midline  Lungs: normal respiratory effort, no respiratory distress, no wheezes  CV: regular rate, 2+ pulses  Abdomen: soft, non-tender, non-distended, no organomegaly  MSK: no edema, no deformities, normal ROM  Skin: skin color, texture, turgor normal.  Neurologic: no focal deficits, sensation intact    Labs:    Lab Results   Component Value Date    WBC 8.64 09/24/2022    HGB 12.7 09/24/2022    HCT 42.6 09/24/2022    MCV 90 09/24/2022     09/24/2022       BMP  Lab Results   Component Value Date     09/24/2022    K 3.9 09/24/2022     09/24/2022    CO2 22 (L) 09/24/2022    BUN 20 09/24/2022    CREATININE 1.5 (H) 09/24/2022    CALCIUM 10.1 09/24/2022    ANIONGAP 11 09/24/2022    ESTGFRAFRICA 27.5 (A) 05/02/2022    EGFRNONAA 23.8 (A) 05/02/2022     Imaging:  CTAP 8/24/22: 4 and 7 mm right renal stones. No right hydro. 4mm proximal right ureteral stone. No left urolithiasis.    Assessment: Edita Riley is a 57 y.o. female with right nephrolithiasis    EKG without concern 8/26/22.    Plan:     1. To OR on 10/13/22 for right PCNL and antegrade URS  2. Surgery consents signed. Patient is a Alevism and does not consent to blood transfusion.   3. I have explained the risk, benefits, and alternatives of the procedure in detail. The patient  voices understanding and all questions have been answered. The patient agrees to proceed as planned.   4. Type and cross 2 units  5. Urine for culture from R neph tube today  6. Will prescribe 7 days of augmentin before surgery  7. Needs medicine clearance before surgery. [place] to schedule.     ANITHA MCKEON MD

## 2022-10-03 ENCOUNTER — OFFICE VISIT (OUTPATIENT)
Dept: UROLOGY | Facility: CLINIC | Age: 57
End: 2022-10-03
Payer: MEDICAID

## 2022-10-03 DIAGNOSIS — N20.9 UROLITHIASIS: Primary | ICD-10-CM

## 2022-10-03 PROCEDURE — 99499 UNLISTED E&M SERVICE: CPT | Mod: S$PBB,,, | Performed by: UROLOGY

## 2022-10-03 PROCEDURE — 87086 URINE CULTURE/COLONY COUNT: CPT

## 2022-10-03 PROCEDURE — 99499 NO LOS: ICD-10-PCS | Mod: S$PBB,,, | Performed by: UROLOGY

## 2022-10-03 RX ORDER — METRONIDAZOLE 500 MG/1
500 TABLET ORAL
Status: CANCELLED | OUTPATIENT
Start: 2022-10-03

## 2022-10-03 RX ORDER — ACETAMINOPHEN 500 MG
1000 TABLET ORAL
Status: CANCELLED | OUTPATIENT
Start: 2022-10-03

## 2022-10-03 RX ORDER — SODIUM CHLORIDE 9 MG/ML
INJECTION, SOLUTION INTRAVENOUS CONTINUOUS
Status: CANCELLED | OUTPATIENT
Start: 2022-10-03

## 2022-10-03 RX ORDER — CEFAZOLIN SODIUM 2 G/50ML
2 SOLUTION INTRAVENOUS
Status: CANCELLED | OUTPATIENT
Start: 2022-10-03

## 2022-10-03 RX ORDER — PREGABALIN 75 MG/1
75 CAPSULE ORAL
Status: CANCELLED | OUTPATIENT
Start: 2022-10-03 | End: 2022-10-03

## 2022-10-03 RX ORDER — AMOXICILLIN AND CLAVULANATE POTASSIUM 500; 125 MG/1; MG/1
1 TABLET, FILM COATED ORAL 2 TIMES DAILY
Qty: 14 TABLET | Refills: 0 | Status: SHIPPED | OUTPATIENT
Start: 2022-10-03 | End: 2022-10-10

## 2022-10-04 LAB
BACTERIA UR CULT: NORMAL
BACTERIA UR CULT: NORMAL

## 2022-10-05 ENCOUNTER — TELEPHONE (OUTPATIENT)
Dept: PREADMISSION TESTING | Facility: HOSPITAL | Age: 57
End: 2022-10-05
Payer: MEDICAID

## 2022-10-05 NOTE — TELEPHONE ENCOUNTER
----- Message from Shital Thompson RN sent at 10/5/2022 12:37 PM CDT -----  Needs clearance (per Dr. Russ) // T&S// chart reflects difficult intubation // anesthesia

## 2022-10-05 NOTE — PRE-PROCEDURE INSTRUCTIONS
Spoke to patient reviewed meds -- mailed instructions. Pt states a Dr. Mustafa was her PCP but has not seen in over a year.// chart reflects difficult intubation// states no one told her. Denies any cardiac issues. Has ileostomy and bag to nephrostomy.Explained surgeon requesting a clearance and will need labwork before procedure

## 2022-10-05 NOTE — ANESTHESIA PAT ROS NOTE
"                                                                                                             10/05/2022  Edita Riley is a 57 y.o., female.      Pre-op Assessment          Review of Systems       Anesthesia Assessment: Preoperative EQUATION    Planned Procedure: Procedure(s) (LRB):  NEPHROLITHOTOMY, PERCUTANEOUS (Right)  URETEROSCOPY (Right)  Requested Anesthesia Type:General  Surgeon: Chirag Russ MD  Service: Urology  Known or anticipated Date of Surgery:10/13/2022    Surgeon notes: reviewed    Electronic QUestionnaire Assessment completed via nurse interview with patient.        Triage considerations:   Per chart Hx of difficult intubation  "Anterior larynx, small mouth and poor neck/head extension (poor mouth opening)"  Video laryngoscopy    The patient has no apparent active cardiac condition (No unstable coronary Syndrome such as severe unstable angina or recent [<1 month] myocardial infarction, decompensated CHF, severe valvular   disease or significant arrhythmia)    Previous anesthesia records:GETA   4/20/21    Last PCP note:  states Dr. Mustafa  pcp   but no notes over a year  Subspecialty notes: Cardiology: General, Gastroenterology, Gyn/ONC, Hematology/Oncology, Infectious Disease, Neurology, Neurosurgery, PM&R, Spine Service, Urology    Other important co-morbidities: GERD and HTN    Has ileostomy and nephrostomy drainage bags   Abnormal mammogram 8/25/2020    Acute deep vein thrombosis (DVT) of lower extremity 12/9/2020    Anxiety      Cervical cancer 2014    Chronic back pain      Depression      Diarrhea due to malabsorption 11/14/2018    DVT of lower extremity, bilateral 11/4/2020    Fibromyalgia      Fungemia 9/27/2020    Generalized abdominal pain 8/25/2020    GERD (gastroesophageal reflux disease)      Hemifacial spasm 9/16/2015    Hiatal hernia 2014    History of cervical cancer 10/11/2018    Hx of psychiatric care       Cymbalta, trazodone    Hypertension      " Hypomagnesemia 11/21/2018    Lactose intolerance      Metastatic squamous cell carcinoma to lymph node 10/11/2018    Nephrostomy tube displaced      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 drug 11/14/2018    Osteoarthritis of back      Peritonitis 9/22/2020    Pseudomonas urinary tract infection 4/21/2021    Psychiatric problem      Schatzki's ring 9/14/2015     Seen on outside EGD 05/2014, underwent esophageal dilatation. Bx were negative.     Stroke 1972    Urinary tract infection associated with nephrostomy catheter 6/11/2020    Wound infection after surgery 9/24/2020      Tests already available:  Available tests,  within 3 months , within [facility] .             Instructions given. (See in Nurse's note)    Optimization:  hx of difficult intubation                           Medical Opinion Indicated           Plan:    Testing:  T&S   Pre-anesthesia  visit       Visit focus: to be determined     Consultation:IM Perioperative Hospitalist       Navigation: Tests Scheduled.              Consults scheduled.             Results will be tracked by Preop Clinic.

## 2022-10-07 ENCOUNTER — LAB VISIT (OUTPATIENT)
Dept: LAB | Facility: HOSPITAL | Age: 57
End: 2022-10-07
Attending: ANESTHESIOLOGY
Payer: MEDICAID

## 2022-10-07 DIAGNOSIS — Z01.818 PRE-OP TESTING: ICD-10-CM

## 2022-10-07 LAB
ABO + RH BLD: NORMAL
BLD GP AB SCN CELLS X3 SERPL QL: NORMAL

## 2022-10-07 PROCEDURE — 86901 BLOOD TYPING SEROLOGIC RH(D): CPT | Performed by: ANESTHESIOLOGY

## 2022-10-07 PROCEDURE — 36415 COLL VENOUS BLD VENIPUNCTURE: CPT | Performed by: ANESTHESIOLOGY

## 2022-10-10 ENCOUNTER — OFFICE VISIT (OUTPATIENT)
Dept: INTERNAL MEDICINE | Facility: CLINIC | Age: 57
End: 2022-10-10
Payer: MEDICAID

## 2022-10-10 VITALS
WEIGHT: 191 LBS | TEMPERATURE: 98 F | HEIGHT: 69 IN | SYSTOLIC BLOOD PRESSURE: 108 MMHG | OXYGEN SATURATION: 100 % | BODY MASS INDEX: 28.29 KG/M2 | DIASTOLIC BLOOD PRESSURE: 60 MMHG | HEART RATE: 79 BPM

## 2022-10-10 DIAGNOSIS — F32.A DEPRESSION, UNSPECIFIED DEPRESSION TYPE: Chronic | ICD-10-CM

## 2022-10-10 DIAGNOSIS — Z01.810 PREPROCEDURAL CARDIOVASCULAR EXAMINATION: ICD-10-CM

## 2022-10-10 DIAGNOSIS — E44.0 MODERATE MALNUTRITION: ICD-10-CM

## 2022-10-10 DIAGNOSIS — N39.0 URINARY TRACT INFECTION ASSOCIATED WITH NEPHROSTOMY CATHETER, SUBSEQUENT ENCOUNTER: ICD-10-CM

## 2022-10-10 DIAGNOSIS — N18.9 ANEMIA DUE TO CHRONIC KIDNEY DISEASE, UNSPECIFIED CKD STAGE: ICD-10-CM

## 2022-10-10 DIAGNOSIS — C77.9 METASTATIC SQUAMOUS CELL CARCINOMA TO LYMPH NODE: Chronic | ICD-10-CM

## 2022-10-10 DIAGNOSIS — G47.20 SLEEP STAGE DYSFUNCTION: ICD-10-CM

## 2022-10-10 DIAGNOSIS — G62.0 NEUROPATHY DUE TO CHEMOTHERAPEUTIC DRUG: ICD-10-CM

## 2022-10-10 DIAGNOSIS — Z86.79 HISTORY OF ESSENTIAL HYPERTENSION: ICD-10-CM

## 2022-10-10 DIAGNOSIS — Z93.2 ILEOSTOMY IN PLACE: Chronic | ICD-10-CM

## 2022-10-10 DIAGNOSIS — D63.1 ANEMIA DUE TO CHRONIC KIDNEY DISEASE, UNSPECIFIED CKD STAGE: ICD-10-CM

## 2022-10-10 DIAGNOSIS — N17.9 AKI (ACUTE KIDNEY INJURY): ICD-10-CM

## 2022-10-10 DIAGNOSIS — M79.7 FIBROMYALGIA: Chronic | ICD-10-CM

## 2022-10-10 DIAGNOSIS — Z86.718 HISTORY OF DVT (DEEP VEIN THROMBOSIS): Chronic | ICD-10-CM

## 2022-10-10 DIAGNOSIS — Z85.41 HISTORY OF CERVICAL CANCER: Chronic | ICD-10-CM

## 2022-10-10 DIAGNOSIS — R07.9 CHEST PAIN, UNSPECIFIED TYPE: Primary | ICD-10-CM

## 2022-10-10 DIAGNOSIS — T45.1X5A NEUROPATHY DUE TO CHEMOTHERAPEUTIC DRUG: ICD-10-CM

## 2022-10-10 DIAGNOSIS — T83.512D URINARY TRACT INFECTION ASSOCIATED WITH NEPHROSTOMY CATHETER, SUBSEQUENT ENCOUNTER: ICD-10-CM

## 2022-10-10 DIAGNOSIS — R56.9 SEIZURE-LIKE ACTIVITY: ICD-10-CM

## 2022-10-10 DIAGNOSIS — Z93.6 NEPHROSTOMY STATUS: Chronic | ICD-10-CM

## 2022-10-10 PROBLEM — Z71.89 ADVANCE CARE PLANNING: Status: RESOLVED | Noted: 2021-04-30 | Resolved: 2022-10-10

## 2022-10-10 PROBLEM — R19.7 DIARRHEA DUE TO MALABSORPTION: Status: RESOLVED | Noted: 2018-11-14 | Resolved: 2022-10-10

## 2022-10-10 PROBLEM — K90.9 DIARRHEA DUE TO MALABSORPTION: Status: RESOLVED | Noted: 2018-11-14 | Resolved: 2022-10-10

## 2022-10-10 PROCEDURE — 3074F PR MOST RECENT SYSTOLIC BLOOD PRESSURE < 130 MM HG: ICD-10-PCS | Mod: CPTII,,, | Performed by: NURSE PRACTITIONER

## 2022-10-10 PROCEDURE — 99214 OFFICE O/P EST MOD 30 MIN: CPT | Mod: PBBFAC | Performed by: NURSE PRACTITIONER

## 2022-10-10 PROCEDURE — 3008F BODY MASS INDEX DOCD: CPT | Mod: CPTII,,, | Performed by: NURSE PRACTITIONER

## 2022-10-10 PROCEDURE — 99999 PR PBB SHADOW E&M-EST. PATIENT-LVL IV: CPT | Mod: PBBFAC,,, | Performed by: NURSE PRACTITIONER

## 2022-10-10 PROCEDURE — 99999 PR PBB SHADOW E&M-EST. PATIENT-LVL IV: ICD-10-PCS | Mod: PBBFAC,,, | Performed by: NURSE PRACTITIONER

## 2022-10-10 PROCEDURE — 1159F MED LIST DOCD IN RCRD: CPT | Mod: CPTII,,, | Performed by: NURSE PRACTITIONER

## 2022-10-10 PROCEDURE — 99215 OFFICE O/P EST HI 40 MIN: CPT | Mod: S$PBB,,, | Performed by: NURSE PRACTITIONER

## 2022-10-10 PROCEDURE — 3078F DIAST BP <80 MM HG: CPT | Mod: CPTII,,, | Performed by: NURSE PRACTITIONER

## 2022-10-10 PROCEDURE — 3074F SYST BP LT 130 MM HG: CPT | Mod: CPTII,,, | Performed by: NURSE PRACTITIONER

## 2022-10-10 PROCEDURE — 99215 PR OFFICE/OUTPT VISIT, EST, LEVL V, 40-54 MIN: ICD-10-PCS | Mod: S$PBB,,, | Performed by: NURSE PRACTITIONER

## 2022-10-10 PROCEDURE — 3078F PR MOST RECENT DIASTOLIC BLOOD PRESSURE < 80 MM HG: ICD-10-PCS | Mod: CPTII,,, | Performed by: NURSE PRACTITIONER

## 2022-10-10 PROCEDURE — 1159F PR MEDICATION LIST DOCUMENTED IN MEDICAL RECORD: ICD-10-PCS | Mod: CPTII,,, | Performed by: NURSE PRACTITIONER

## 2022-10-10 PROCEDURE — 3008F PR BODY MASS INDEX (BMI) DOCUMENTED: ICD-10-PCS | Mod: CPTII,,, | Performed by: NURSE PRACTITIONER

## 2022-10-10 NOTE — DISCHARGE INSTRUCTIONS
Your surgery has been scheduled for:__________10/13/22________________________________    You should report to:  __X__[facility], located on the [place] side of the first floor of the           [facility] ([phone])  ____[facility], located on the [facility] side of the            Second floor of the [facility] ([phone])  ____3rd Floor [facility] located on the [facility] side of the [facility] [phone]  ____[facility]: [address] [place] [address]. Check in on the first floor of the hospital.  Please Note   Tell your doctor if you take Aspirin, products containing Aspirin, herbal medications  or blood thinners, such as Coumadin, Ticlid, or Plavix.  (Consult your provider regarding holding or stopping before surgery).  Arrange for someone to drive you home following surgery.  You will not be allowed to leave the surgical facility alone or drive yourself home following sedation and anesthesia.  Before Surgery  Stop taking all herbal medications 7 days prior to surgery  No Motrin/Advil (Ibuprofen)  7 day before surgery  No Aleve (Naproxen) 7 days before surgery  Stop Taking Asprin, products containing Asprin ___7__days before surgery  No Goody's/BC  Powder 7 days before surgery  Refrain from drinking alcoholic beverages for 24 hours before and after surgery  Stop or limit smoking to at least 24 hours prior to surgery.  You may take Tylenol for pain  Night before Surgery  Do not eat or drink after midnight  Take a shower or bath (shower is recommended).  Bathe with Hibiclens soap or an antibacterial soap from the neck down.  If not supplied by your surgeon, hibiclens soap will need to be purchased over the counter in pharmacy.  Rinse soap off thoroughly.  Shampoo your hair with your regular shampoo  The Day of Surgery  Take another bath or shower with hibiclens  or any antibacterial soap, to reduce the chance of infection.  Take heart and blood pressure medications with a small sip of water, as advised by the perioperative team.  Do not take fluid pills  You may brush your teeth and rinse your mouth, but do not swall any additional water.   Do not apply perfumes, powder, body lotions or deodorant on the day of surgery.  Nail polish should be removed.  Do not wear makeup or moisturizer  Wear comfortable clothes, such as a button front shirt and loose fitting pants.  Leave all jewelry, including body piercings, and valuables at home.    Bring any devices you will neeed after surgery such as crutches or canes.  If you have sleep apnea, please bring your CPAP machine  In the event that your physical condition changes including the onset of a cold or respiratory illness, or if you have to delay or cancel your surgery, please notify your surgeon.

## 2022-10-10 NOTE — Clinical Note
Ms. Riley has been optimized by Cardiology- stress test was negative for myocardial ischemia.  Thank you, Cordell

## 2022-10-10 NOTE — ASSESSMENT & PLAN NOTE
Had Dr. Lemon but he retired  Has not followed up since 12/21/2020- encouraged patient to follow up for cervical cancer    complicated surgical/onc history of SCC of the cervix   - status post pelvic radiation   - complicated by bilateral ureteral strictures and bilateral hydronephrosis for which she underwent ileal conduit 09/2020, postop course complicated by bowel perforation now status post colectomy with ileostomy

## 2022-10-10 NOTE — HPI
This is a 57 y.o. female  who presents today for a preoperative evaluation in preparation for a Urology  procedure.  Scheduled for  10/13/22  Surgery is indicated for nephrolithotomy .   Patient is new to me.  Details of current problem: The duration of problem is has a history of kidney stones;   States she has had tubes in for 4 year  Reports symptoms of  back pain both side  Aggravating factors include:  n/a  Relieving factors are n/a     Treated with  nephrostomy tubes, Tylenol for pain  Reports pain: 9/10  The history has been obtained by speaking with the patient and reviewing the electronic medical record and/or outside health information. Significant health conditions for the perioperative period are discussed below in assessment and plan.     Patient reports current health status to be Fair.  Denies any new symptoms before surgery.

## 2022-10-10 NOTE — ASSESSMENT & PLAN NOTE
Pelon Burden MD for Gynecologic Oncology (12/6/2018  7:01 AM)    --underwent stroke eval per [facility] neuro: low suspicion for stroke  --episode may be related to seizure: neurology on board, appreciate recs  ----EEG performed - normal per neuro note  ----lacosamide dc'ed  ----repeat MRI w ctst unremarkable  --continuing electrolyte replacement PRN  --s/p speech eval: regular diet and PO meds okay - rec outpatient speech therapy  --PT/OT consult: rec outpatient therapy

## 2022-10-10 NOTE — ASSESSMENT & PLAN NOTE
GFR 39.9 BUN  20 CR 1.5  Deleterious effects NSAID's , Beneficial effects of Hydration discussed   Tylenol as needed for pain   I suggest monitoring renal function, in put and out put status socorro-operatively. I  suggest avoiding nephrotoxic medication including NSAIDs, COX2 inhibitors, intravenous contrast agent,avoiding hypotension to prevent further renal impairment.

## 2022-10-10 NOTE — Clinical Note
Ms. Riley came to her appointment today reporting she has had chest pain several times in the last 2 weeks; she is also unable to meet 4 METs as she states she would be SOB if she climbed a flight of stairs.  The only appointment for a Medicaid patient for  Cardiology clearance is 10/14/22. Thank you, Cordell

## 2022-10-10 NOTE — OUTPATIENT SUBJECTIVE & OBJECTIVE
Outpatient Subjective & Objective      Chief Complaint: Preoperative evaulation, perioperative medical management, and complication reduction plan.     Functional Capacity:  Able to climb a flight of stairs without CP or Syncope.  States she would have SOB      Anesthesia issues: None    Difficulty mouth opening: none    Steroid use in the last 12 months: none     Dental Issues: none     Family anesthesia difficulty: None     Family Hx of Thrombosis none    Past Medical History:   Diagnosis Date    Abnormal mammogram 08/25/2020    Acute deep vein thrombosis (DVT) of lower extremity 12/09/2020    Anxiety     Cardiovascular event risk -- low 09/14/2015    ASCVD 10-year risk 1.9% (with optimal risk factors 1.3%) as of 9/14/2015     Cervical cancer 2014    Chronic back pain     Deep vein thrombosis     Depression     Diarrhea due to malabsorption 11/14/2018    Difficult intubation     Disorder of kidney and ureter     DVT of lower extremity, bilateral 11/04/2020    Fibromyalgia     Fungemia 09/27/2020    Generalized abdominal pain 08/25/2020    GERD (gastroesophageal reflux disease)     Hemifacial spasm 09/16/2015    Hiatal hernia 2014    History of cervical cancer 10/11/2018    Hx of psychiatric care     Cymbalta, trazodone    Hypertension     Hypomagnesemia 11/21/2018    Lactose intolerance     Metastatic squamous cell carcinoma to lymph node 10/11/2018    Nephrostomy tube displaced     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 Bx were negative.     Seizures     Stroke 1972    Urinary tract infection associated with nephrostomy catheter 06/11/2020    Wound infection after surgery 09/24/2020     Past Surgical History:   Procedure Laterality Date    ANTEGRADE NEPHROSTOGRAPHY Bilateral  9/28/2020    Procedure: Nephrostogram - antegrade;  Surgeon: Leslie Balbuena MD;  Location: [facility] OR 1ST FLR;  Service: Urology;  Laterality: Bilateral;    ANTEGRADE NEPHROSTOGRAPHY Left 4/20/2021    Procedure: NEPHROSTOGRAM, ANTEGRADE;  Surgeon: Leslie Balbuena MD;  Location: [facility] OR 1ST FLR;  Service: Urology;  Laterality: Left;    BILATERAL OOPHORECTOMY  2015    CHOLECYSTECTOMY  11/09/2016    Done at [facility], path showed chronic cholecystitis and gallstones    cold knife conization  2014    COLECTOMY, RIGHT  9/17/2020    Procedure: COLECTOMY, RIGHT Extended;  Surgeon: Hammad Reynolds MD;  Location: [facility] OR 2ND FLR;  Service: Colon and Rectal;;    COLONOSCOPY  2014    COLONOSCOPY N/A 10/24/2016    at [facility], Dr Gutiérrez    COLONOSCOPY N/A 5/18/2018    Procedure: COLONOSCOPY;  Surgeon: Arden Gutiérrez MD;  Location: [facility] (4TH FLR);  Service: Endoscopy;  Laterality: N/A;    COLONOSCOPY N/A 7/28/2020    Procedure: COLONOSCOPY;  Surgeon: Hammad Reynolds MD;  Location: [facility] ENDO (4TH FLR);  Service: Colon and Rectal;  Laterality: N/A;  covid test [place] 7/25    CYSTOSCOPY WITH URETEROSCOPY, RETROGRADE PYELOGRAPHY, AND INSERTION OF STENT Bilateral 3/21/2020    Procedure: CYSTOSCOPY, WITH RETROGRADE PYELOGRAM,;  Surgeon: Leslie Balbuena MD;  Location: [facility] OR 1ST FLR;  Service: Urology;  Laterality: Bilateral;    ESOPHAGOGASTRODUODENOSCOPY  2014    ESOPHAGOGASTRODUODENOSCOPY  11/18/2020    ESOPHAGOGASTRODUODENOSCOPY N/A 11/18/2020    Procedure: ESOPHAGOGASTRODUODENOSCOPY (EGD);  Surgeon: Zenon Spencer MD;  Location: [facility];  Service: Endoscopy;  Laterality: N/A;    ESOPHAGOGASTRODUODENOSCOPY N/A 12/11/2020    Procedure: EGD (ESOPHAGOGASTRODUODENOSCOPY);  Surgeon: Juancho Muse MD;  Location: [facility] ENDO (2ND FLR);  Service: Endoscopy;  Laterality: N/A;    HYSTERECTOMY  2014    [facility] for cervical cancer    ILEOSTOMY  9/17/2020    Procedure: CREATION, ILEOSTOMY;  Surgeon: Hammad Reynolds MD;  Location:  [facility] OR 2ND FLR;  Service: Colon and Rectal;;    LOOPOGRAM N/A 4/20/2021    Procedure: LOOPOGRAM;  Surgeon: Leslie Balbuena MD;  Location: [facility] OR 1ST FLR;  Service: Urology;  Laterality: N/A;    MOBILIZATION OF SPLENIC FLEXURE N/A 9/11/2020    Procedure: MOBILIZATION, COLONIC;  Surgeon: Hammad Reynolds MD;  Location: [facility] OR 2ND FLR;  Service: Colon and Rectal;  Laterality: N/A;    NEPHROSTOGRAPHY Bilateral 4/17/2021    Procedure: Nephrostogram;  Surgeon: Celeste Surgeon;  Location: [facility];  Service: Anesthesiology;  Laterality: Bilateral;    OOPHORECTOMY      RETROPERITONEAL LYMPHADENECTOMY Right 9/17/2018    Procedure: LYMPHADENECTOMY, RETROPERITONEUM;  Surgeon: Sebas Reed MD;  Location: [facility] OR 2ND FLR;  Service: General;  Laterality: Right;  ILIAC       Review of Systems   Constitutional:  Negative for chills, fatigue and fever.   HENT:  Negative for trouble swallowing and voice change.    Eyes:  Negative for photophobia and visual disturbance.        No acute visual changes   Respiratory:  Negative for apnea, cough, chest tightness, shortness of breath and wheezing.         STOP bang  Score 2  Low risk JENN     Cardiovascular:  Positive for chest pain. Negative for palpitations and leg swelling.   Gastrointestinal:  Negative for abdominal distention, abdominal pain, blood in stool, constipation, diarrhea, nausea and vomiting.        NO FLD, hepatitis, cirrhosis  No BRB or black tarry stool    Stool ostomy   Endocrine: Positive for cold intolerance. Negative for heat intolerance, polydipsia, polyphagia and polyuria.   Genitourinary:  Positive for flank pain. Negative for difficulty urinating, dysuria, frequency, hematuria and urgency.        Bilateral Nephrostomy tubes   Musculoskeletal:  Positive for myalgias. Negative for arthralgias, back pain, gait problem, joint swelling, neck pain and neck stiffness.   Neurological:  Positive for dizziness and headaches. Negative for tremors, seizures, syncope,  "weakness, light-headedness and numbness.   Psychiatric/Behavioral:  Negative for suicidal ideas. The patient is not nervous/anxious.       VITALS  Visit Vitals  /60 (BP Location: Left arm, Patient Position: Sitting)   Pulse 79   Temp 98.4 °F (36.9 °C) (Oral)   Ht 5' 9" (1.753 m)   Wt 86.6 kg (191 lb)   LMP 06/08/2014 (Approximate)   SpO2 100%   BMI 28.21 kg/m²      Physical Exam  Constitutional:       General: She is not in acute distress.     Appearance: Normal appearance. She is well-developed. She is not diaphoretic.      Comments: Patient reports difficulty bathing  Noted stained appearance of clothing and ostomy holders with brown substance   HENT:      Head: Normocephalic.      Right Ear: Hearing normal.      Left Ear: Hearing normal.      Nose: Nose normal.      Mouth/Throat:      Lips: Pink.      Mouth: Mucous membranes are moist.      Pharynx: No oropharyngeal exudate.   Eyes:      General: Lids are normal.         Right eye: No discharge.         Left eye: No discharge.      Conjunctiva/sclera: Conjunctivae normal.      Pupils: Pupils are equal, round, and reactive to light.   Neck:      Thyroid: No thyromegaly.      Vascular: No carotid bruit or JVD.      Trachea: Trachea and phonation normal. No tracheal deviation.   Cardiovascular:      Rate and Rhythm: Normal rate and regular rhythm.      Pulses:           Carotid pulses are 2+ on the right side and 2+ on the left side.       Radial pulses are 2+ on the right side and 2+ on the left side.        Posterior tibial pulses are 1+ on the right side and 1+ on the left side.      Heart sounds: Normal heart sounds. No murmur heard.    No friction rub. No gallop.   Pulmonary:      Effort: Pulmonary effort is normal. No respiratory distress.      Breath sounds: No stridor or decreased air movement. Examination of the right-middle field reveals decreased breath sounds. Examination of the left-middle field reveals decreased breath sounds. Examination of the " right-lower field reveals decreased breath sounds. Examination of the left-lower field reveals decreased breath sounds. Decreased breath sounds present. No wheezing or rales.   Chest:      Chest wall: No tenderness.   Abdominal:      General: Bowel sounds are normal. There is no distension.      Palpations: Abdomen is soft.      Tenderness: There is no abdominal tenderness. There is no guarding.          Comments: Stool ostomy had soft stool in back  Urine ostomy- noted clear yellow urine   Musculoskeletal:         General: No tenderness or deformity. Normal range of motion.      Cervical back: Normal range of motion and neck supple. No rigidity.        Back:       Right lower le+ Edema present.      Left lower le+ Edema present.   Lymphadenopathy:      Head:      Right side of head: No submental, submandibular, tonsillar, preauricular, posterior auricular or occipital adenopathy.      Left side of head: No submental, submandibular, tonsillar, preauricular, posterior auricular or occipital adenopathy.      Cervical: No cervical adenopathy.   Skin:     General: Skin is warm and dry.      Capillary Refill: Capillary refill takes 2 to 3 seconds.      Coloration: Skin is not pale.      Findings: No erythema or rash.   Neurological:      Mental Status: She is alert and oriented to person, place, and time. Mental status is at baseline.      GCS: GCS eye subscore is 4. GCS verbal subscore is 5. GCS motor subscore is 6.      Motor: No abnormal muscle tone.      Coordination: Coordination normal.   Psychiatric:         Attention and Perception: Attention and perception normal.         Mood and Affect: Mood normal. Affect is flat.         Speech: Speech normal.         Behavior: Behavior is slowed. Behavior is cooperative.         Thought Content: Thought content normal.         Cognition and Memory: Memory normal.         Judgment: Judgment normal.        Significant Labs:  Lab Results   Component Value Date    WBC  8.64 09/24/2022    HGB 12.7 09/24/2022    HCT 42.6 09/24/2022     09/24/2022    CHOL 203 (H) 04/26/2020    TRIG 694 (H) 11/17/2020    HDL 65 (H) 04/26/2020    ALT 17 09/24/2022    AST 16 09/24/2022     09/24/2022    K 3.9 09/24/2022     09/24/2022    CREATININE 1.5 (H) 09/24/2022    BUN 20 09/24/2022    CO2 22 (L) 09/24/2022    TSH 0.527 12/02/2018    INR 0.9 08/29/2022    GLUF 94 09/20/2016    HGBA1C 4.9 02/04/2021       Diagnostic Studies: No relevant studies.    EKG:   Results for orders placed or performed during the hospital encounter of 08/26/22   EKG 12-lead    Collection Time: 08/26/22  7:51 PM    Narrative    Test Reason : E87.5,    Vent. Rate : 082 BPM     Atrial Rate : 082 BPM     P-R Int : 132 ms          QRS Dur : 086 ms      QT Int : 372 ms       P-R-T Axes : 080 091 085 degrees     QTc Int : 434 ms    Normal sinus rhythm  Rightward axis  Borderline Abnormal ECG  When compared with ECG of 13-SEP-2021 16:03,  No significant change was found  Confirmed by CHINO BACK MD ([id number]) on 8/27/2022 2:23:26 PM    Referred By: [email or web address]   SELF           Confirmed By:CHINO BACK MD       2D ECHO:  TTE:  Results for orders placed or performed during the hospital encounter of 04/16/21   Echo Color Flow Doppler? Yes   Result Value Ref Range    BSA 1.79 m2    TDI SEPTAL 0.10 m/s    LV LATERAL E/E' RATIO 6.09 m/s    LV SEPTAL E/E' RATIO 6.70 m/s    LA WIDTH 3.43 cm    TDI LATERAL 0.11 m/s    LVIDd 4.04 3.5 - 6.0 cm    IVS 0.60 0.6 - 1.1 cm    Posterior Wall 0.63 0.6 - 1.1 cm    LVIDs 2.76 2.1 - 4.0 cm    FS 32 28 - 44 %    LA volume 26.45 cm3    Sinus 2.77 cm    STJ 2.76 cm    Ascending aorta 2.81 cm    LV mass 67.51 g    LA size 2.27 cm    RVDD 3.03 cm    TAPSE 1.79 cm    RV S' 11.80 cm/s    Left Ventricle Relative Wall Thickness 0.31 cm    AV mean gradient 3 mmHg    AV Velocity Ratio 0.91     AV index (prosthetic) 0.89     MV valve area p 1/2 method 4.24 cm2    E/A ratio 0.97     Mean e'  "0.11 m/s    E wave deceleration time 178.89 msec    MV "A" wave duration 10.56 msec    Pulm vein S/D ratio 1.14     LVOT peak abdelrahman 1.06 m/s    LVOT peak VTI 18.99 cm    Ao peak abdelrahman 1.16 m/s    Ao VTI 21.26 cm    AV peak gradient 5 mmHg    E/E' ratio 6.38 m/s    MV Peak E Abdelrahman 0.67 m/s    MV stenosis pressure 1/2 time 51.88 ms    MV Peak A Abdelrahman 0.69 m/s    PV Peak S Abdelrahman 0.41 m/s    PV Peak D Abdelrahman 0.36 m/s    LV Systolic Volume 28.58 mL    LV Systolic Volume Index 15.9 mL/m2    LV Diastolic Volume 71.72 mL    LV Diastolic Volume Index 39.84 mL/m2    LA Volume Index 14.7 mL/m2    LV Mass Index 38 g/m2    RA Major Axis 3.68 cm    Left Atrium Minor Axis 3.89 cm    Left Atrium Major Axis 4.11 cm    LA Volume Index (Mod) 20.5 mL/m2    LA volume (mod) 36.96 cm3    RA Width 2.71 cm    Right Atrial Pressure (from IVC) 3 mmHg    EF 65 %    Narrative    · The left ventricle is normal in size with normal systolic function.  · The estimated ejection fraction is 65%.  · Normal left ventricular diastolic function.  · Normal right ventricular size with normal right ventricular systolic   function.  · Normal central venous pressure (3 mmHg).          VALERIA:  No results found for this or any previous visit.     Imaging     Active Cardiac Conditions: C/O chest pain in the past 2 weeks      Revised Cardiac Risk Index   High -Risk Surgery  Intraperitoneal; Intrathoracic; suprainguinal vascular Yes- + 1 No- 0   History of Ischemic Heart Disease   (Hx of MI/positive exercise test/current chest pain due to ischemia/use of nitrate therapy/EKG with pathological Q waves) Yes- + 1 No- 0   History of CHF  (Pulmonary edema/bilateral rales or S3 gallop/PND/CXR showing pulmonary vascular redistribution) Yes- + 1 No- 0   History of CVA   (Prior stroke or TIA) Yes- + 1 No- 0   Pre-operative treatment with insulin Yes- + 1 No- 0   Pre-operative creatinine > 2mg/dl Yes- + 1 No- 0   Total:      Risk Status:  Estimated risk of cardiac complications after " non-cardiac surgery using the Revised Cardiac Risk Index for Preoperative risk is 3.9      ARISCAT (Canet) risk index: Low: 1.6% risk of post-op pulmonary complications.    American Society of Anesthesiologists Physical Status classification (ASA): 3         No further cardiac workup needed prior to surgery.    Outpatient Subjective & Objective

## 2022-10-10 NOTE — ASSESSMENT & PLAN NOTE
Had Dr. Lemon but he retired  Has not followed up since 12/21/2020- encouraged patient to follow up for cervical cancer

## 2022-10-10 NOTE — PATIENT INSTRUCTIONS
Preventive perioperative care    Thromboembolic prophylaxis:  Her risk factors for thrombosis include surgical procedure, age, and reduced mobility.I suggest  thromboembolic prophylaxis ( mechanical/pharmacological, weighing the risk benefits of pharmacological agent use considering socorro procedural bleeding )  during the perioperative period.I suggested being active in the post operative period.      Postoperative pulmonary complication prophylaxis-Risk factors for post operative pulmonary complications include ASA class >2- I suggest incentive spirometry use, early ambulation, and pain control so as to avoid diaphragmatic splinting  Brush teeth twice per day, oral rinses, sleep with the head of the bed up 30 degrees     Renal complication prophylaxis-Risk factors for renal complications include pre-existing renal disease . I suggest keeping her well hydrated and avoidance/ minimizing the use of  NSAID's,JACOBSON 2 Inhibitors ,IV contrast if possible in the perioperative period.I suggested drinking 2 litre's of water a day      Surgical site Infection Prophylaxis-I  suggest appropriate antibiotic for Prophylaxis against Surgical site infections Shower with Hibiclens or Dial antibacterial soap in the night before surgery and the morning of surgery     This visit was focused on Preoperative evaluation, Perioperative Medical management, complication reduction plans. I suggest that the patient follows up with primary care or relevant sub specialists for ongoing health care.    I appreciate the opportunity to be involved in this patients care. Please feel free to contact me if there were any questions about this consultation.

## 2022-10-10 NOTE — PROGRESS NOTES
[facility]  Progress Note    Patient Name: Edita Riley  MRN: [id number]  Date of Evaluation- 10/25/2022  PCP- Primary Doctor No    Future cases for Edita Riley [[id number]]       Case ID Status Date Time Yeison Procedure Provider Location    [id number] [facility] 10/13/2022  9:45  NEPHROLITHOTOMY, PERCUTANEOUS Chirag Russ MD [[id number]] [facility] OR 1ST FLR            HPI:  This is a 57 y.o. female  who presents today for a preoperative evaluation in preparation for a Urology  procedure.  Scheduled for  10/13/22  Surgery is indicated for nephrolithotomy .   Patient is new to me.  Details of current problem: The duration of problem is has a history of kidney stones;   States she has had tubes in for 4 year  Reports symptoms of  back pain both side  Aggravating factors include:  n/a  Relieving factors are n/a     Treated with  nephrostomy tubes, Tylenol for pain  Reports pain: 9/10  The history has been obtained by speaking with the patient and reviewing the electronic medical record and/or outside health information. Significant health conditions for the perioperative period are discussed below in assessment and plan.     Patient reports current health status to be Fair.  Denies any new symptoms before surgery.       Subjective/ Objective:     Chief Complaint: Preoperative evaulation, perioperative medical management, and complication reduction plan.     Functional Capacity:  Able to climb a flight of stairs without CP or Syncope.  States she would have SOB      Anesthesia issues: None    Difficulty mouth opening: none    Steroid use in the last 12 months: none     Dental Issues: none     Family anesthesia difficulty: None     Family Hx of Thrombosis none    Past Medical History:   Diagnosis Date    Abnormal mammogram 08/25/2020    Acute deep vein thrombosis (DVT) of lower extremity 12/09/2020    Anxiety     Cardiovascular event risk -- low 09/14/2015    ASCVD 10-year risk 1.9% (with  optimal risk factors 1.3%) as of 9/14/2015     Cervical cancer 2014    Chronic back pain     Deep vein thrombosis     Depression     Diarrhea due to malabsorption 11/14/2018    Difficult intubation     Disorder of kidney and ureter     DVT of lower extremity, bilateral 11/04/2020    Fibromyalgia     Fungemia 09/27/2020    Generalized abdominal pain 08/25/2020    GERD (gastroesophageal reflux disease)     Hemifacial spasm 09/16/2015    Hiatal hernia 2014    History of cervical cancer 10/11/2018    Hx of psychiatric care     Cymbalta, trazodone    Hypertension     Hypomagnesemia 11/21/2018    Lactose intolerance     Metastatic squamous cell carcinoma to lymph node 10/11/2018    Nephrostomy tube displaced     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 Bx were negative.     Seizures     Stroke 1972    Urinary tract infection associated with nephrostomy catheter 06/11/2020    Wound infection after surgery 09/24/2020     Past Surgical History:   Procedure Laterality Date    ANTEGRADE NEPHROSTOGRAPHY Bilateral 9/28/2020    Procedure: Nephrostogram - antegrade;  Surgeon: Leslie Balbuena MD;  Location: [facility] OR [address] [place] [address];  Service: Urology;  Laterality: Bilateral;    ANTEGRADE NEPHROSTOGRAPHY Left 4/20/2021    Procedure: NEPHROSTOGRAM, ANTEGRADE;  Surgeon: Leslie Balbuena MD;  Location: [facility] OR [address] [place] [address];  Service: Urology;  Laterality: Left;    BILATERAL OOPHORECTOMY  2015    CHOLECYSTECTOMY  11/09/2016    Done at [facility], path showed chronic cholecystitis and gallstones    cold knife conization  2014    COLECTOMY, RIGHT  9/17/2020    Procedure: COLECTOMY, RIGHT Extended;  Surgeon: Hammad Reynolds MD;  Location: [facility] OR [address] [place] [address];  Service: Colon and Rectal;;    COLONOSCOPY  2014    COLONOSCOPY N/A  10/24/2016    at [facility], Dr Thomas    COLONOSCOPY N/A 5/18/2018    Procedure: COLONOSCOPY;  Surgeon: Arden Gutiérrez MD;  Location: [facility] (4TH FLR);  Service: Endoscopy;  Laterality: N/A;    COLONOSCOPY N/A 7/28/2020    Procedure: COLONOSCOPY;  Surgeon: Hammad Reynolds MD;  Location: [facility] (4TH FLR);  Service: Colon and Rectal;  Laterality: N/A;  covid test [place] 7/25    CYSTOSCOPY WITH URETEROSCOPY, RETROGRADE PYELOGRAPHY, AND INSERTION OF STENT Bilateral 3/21/2020    Procedure: CYSTOSCOPY, WITH RETROGRADE PYELOGRAM,;  Surgeon: Leslie Balbuena MD;  Location: [facility] OR 1ST FLR;  Service: Urology;  Laterality: Bilateral;    ESOPHAGOGASTRODUODENOSCOPY  2014    ESOPHAGOGASTRODUODENOSCOPY  11/18/2020    ESOPHAGOGASTRODUODENOSCOPY N/A 11/18/2020    Procedure: ESOPHAGOGASTRODUODENOSCOPY (EGD);  Surgeon: Zenon Spencer MD;  Location: [facility];  Service: Endoscopy;  Laterality: N/A;    ESOPHAGOGASTRODUODENOSCOPY N/A 12/11/2020    Procedure: EGD (ESOPHAGOGASTRODUODENOSCOPY);  Surgeon: Juancho Muse MD;  Location: [facility] (2ND FLR);  Service: Endoscopy;  Laterality: N/A;    HYSTERECTOMY  2014    [facility] for cervical cancer    ILEOSTOMY  9/17/2020    Procedure: CREATION, ILEOSTOMY;  Surgeon: Hammad Reynolds MD;  Location: [facility] OR 2ND FLR;  Service: Colon and Rectal;;    LOOPOGRAM N/A 4/20/2021    Procedure: LOOPOGRAM;  Surgeon: Leslie Balbuena MD;  Location: [facility] OR 1ST FLR;  Service: Urology;  Laterality: N/A;    MOBILIZATION OF SPLENIC FLEXURE N/A 9/11/2020    Procedure: MOBILIZATION, COLONIC;  Surgeon: Hammad Reynolds MD;  Location: [facility] OR 2ND FLR;  Service: Colon and Rectal;  Laterality: N/A;    NEPHROSTOGRAPHY Bilateral 4/17/2021    Procedure: Nephrostogram;  Surgeon: Celeste Surgeon;  Location: [facility];  Service: Anesthesiology;  Laterality: Bilateral;    OOPHORECTOMY      RETROPERITONEAL LYMPHADENECTOMY Right 9/17/2018    Procedure: LYMPHADENECTOMY, RETROPERITONEUM;  Surgeon: Sebas Reed MD;  " Location: [facility] OR [address] [place] [address];  Service: General;  Laterality: Right;  ILIAC       Review of Systems   Constitutional:  Negative for chills, fatigue and fever.   HENT:  Negative for trouble swallowing and voice change.    Eyes:  Negative for photophobia and visual disturbance.        No acute visual changes   Respiratory:  Negative for apnea, cough, chest tightness, shortness of breath and wheezing.         STOP bang  Score 2  Low risk JENN     Cardiovascular:  Positive for chest pain. Negative for palpitations and leg swelling.   Gastrointestinal:  Negative for abdominal distention, abdominal pain, blood in stool, constipation, diarrhea, nausea and vomiting.        NO FLD, hepatitis, cirrhosis  No BRB or black tarry stool    Stool ostomy   Endocrine: Positive for cold intolerance. Negative for heat intolerance, polydipsia, polyphagia and polyuria.   Genitourinary:  Positive for flank pain. Negative for difficulty urinating, dysuria, frequency, hematuria and urgency.        Bilateral Nephrostomy tubes   Musculoskeletal:  Positive for myalgias. Negative for arthralgias, back pain, gait problem, joint swelling, neck pain and neck stiffness.   Neurological:  Positive for dizziness and headaches. Negative for tremors, seizures, syncope, weakness, light-headedness and numbness.   Psychiatric/Behavioral:  Negative for suicidal ideas. The patient is not nervous/anxious.       VITALS  Visit Vitals  /60 (BP Location: Left arm, Patient Position: Sitting)   Pulse 79   Temp 98.4 °F (36.9 °C) (Oral)   Ht 5' 9" (1.753 m)   Wt 86.6 kg (191 lb)   LMP 06/08/2014 (Approximate)   SpO2 100%   BMI 28.21 kg/m²      Physical Exam  Constitutional:       General: She is not in acute distress.     Appearance: Normal appearance. She is well-developed. She is not diaphoretic.      Comments: Patient reports difficulty bathing  Noted stained appearance of clothing and ostomy holders with brown substance   HENT:      Head: Normocephalic.      " Right Ear: Hearing normal.      Left Ear: Hearing normal.      Nose: Nose normal.      Mouth/Throat:      Lips: Pink.      Mouth: Mucous membranes are moist.      Pharynx: No oropharyngeal exudate.   Eyes:      General: Lids are normal.         Right eye: No discharge.         Left eye: No discharge.      Conjunctiva/sclera: Conjunctivae normal.      Pupils: Pupils are equal, round, and reactive to light.   Neck:      Thyroid: No thyromegaly.      Vascular: No carotid bruit or JVD.      Trachea: Trachea and phonation normal. No tracheal deviation.   Cardiovascular:      Rate and Rhythm: Normal rate and regular rhythm.      Pulses:           Carotid pulses are 2+ on the right side and 2+ on the left side.       Radial pulses are 2+ on the right side and 2+ on the left side.        Posterior tibial pulses are 1+ on the right side and 1+ on the left side.      Heart sounds: Normal heart sounds. No murmur heard.    No friction rub. No gallop.   Pulmonary:      Effort: Pulmonary effort is normal. No respiratory distress.      Breath sounds: No stridor or decreased air movement. Examination of the right-middle field reveals decreased breath sounds. Examination of the left-middle field reveals decreased breath sounds. Examination of the right-lower field reveals decreased breath sounds. Examination of the left-lower field reveals decreased breath sounds. Decreased breath sounds present. No wheezing or rales.   Chest:      Chest wall: No tenderness.   Abdominal:      General: Bowel sounds are normal. There is no distension.      Palpations: Abdomen is soft.      Tenderness: There is no abdominal tenderness. There is no guarding.          Comments: Stool ostomy had soft stool in back  Urine ostomy- noted clear yellow urine   Musculoskeletal:         General: No tenderness or deformity. Normal range of motion.      Cervical back: Normal range of motion and neck supple. No rigidity.        Back:       Right lower le+ Edema  present.      Left lower le+ Edema present.   Lymphadenopathy:      Head:      Right side of head: No submental, submandibular, tonsillar, preauricular, posterior auricular or occipital adenopathy.      Left side of head: No submental, submandibular, tonsillar, preauricular, posterior auricular or occipital adenopathy.      Cervical: No cervical adenopathy.   Skin:     General: Skin is warm and dry.      Capillary Refill: Capillary refill takes 2 to 3 seconds.      Coloration: Skin is not pale.      Findings: No erythema or rash.   Neurological:      Mental Status: She is alert and oriented to person, place, and time. Mental status is at baseline.      GCS: GCS eye subscore is 4. GCS verbal subscore is 5. GCS motor subscore is 6.      Motor: No abnormal muscle tone.      Coordination: Coordination normal.   Psychiatric:         Attention and Perception: Attention and perception normal.         Mood and Affect: Mood normal. Affect is flat.         Speech: Speech normal.         Behavior: Behavior is slowed. Behavior is cooperative.         Thought Content: Thought content normal.         Cognition and Memory: Memory normal.         Judgment: Judgment normal.        Significant Labs:  Lab Results   Component Value Date    WBC 8.64 2022    HGB 12.7 2022    HCT 42.6 2022     2022    CHOL 203 (H) 2020    TRIG 694 (H) 2020    HDL 65 (H) 2020    ALT 17 2022    AST 16 2022     2022    K 3.9 2022     2022    CREATININE 1.5 (H) 2022    BUN 20 2022    CO2 22 (L) 2022    TSH 0.527 2018    INR 0.9 2022    GLUF 94 2016    HGBA1C 4.9 2021       Diagnostic Studies: No relevant studies.    EKG:   Results for orders placed or performed during the hospital encounter of 22   EKG 12-lead    Collection Time: 22  7:51 PM    Narrative    Test Reason : E87.5,    Vent. Rate : 082 BPM     Atrial  "Rate : 082 BPM     P-R Int : 132 ms          QRS Dur : 086 ms      QT Int : 372 ms       P-R-T Axes : 080 091 085 degrees     QTc Int : 434 ms    Normal sinus rhythm  Rightward axis  Borderline Abnormal ECG  When compared with ECG of 13-SEP-2021 16:03,  No significant change was found  Confirmed by NAZANIN RAMSAY, CHINO ([id number]) on 8/27/2022 2:23:26 PM    Referred By: [email or web address]   SELF           Confirmed By:CHINO BACK MD       2D ECHO:  TTE:  Results for orders placed or performed during the hospital encounter of 04/16/21   Echo Color Flow Doppler? Yes   Result Value Ref Range    BSA 1.79 m2    TDI SEPTAL 0.10 m/s    LV LATERAL E/E' RATIO 6.09 m/s    LV SEPTAL E/E' RATIO 6.70 m/s    LA WIDTH 3.43 cm    TDI LATERAL 0.11 m/s    LVIDd 4.04 3.5 - 6.0 cm    IVS 0.60 0.6 - 1.1 cm    Posterior Wall 0.63 0.6 - 1.1 cm    LVIDs 2.76 2.1 - 4.0 cm    FS 32 28 - 44 %    LA volume 26.45 cm3    Sinus 2.77 cm    STJ 2.76 cm    Ascending aorta 2.81 cm    LV mass 67.51 g    LA size 2.27 cm    RVDD 3.03 cm    TAPSE 1.79 cm    RV S' 11.80 cm/s    Left Ventricle Relative Wall Thickness 0.31 cm    AV mean gradient 3 mmHg    AV Velocity Ratio 0.91     AV index (prosthetic) 0.89     MV valve area p 1/2 method 4.24 cm2    E/A ratio 0.97     Mean e' 0.11 m/s    E wave deceleration time 178.89 msec    MV "A" wave duration 10.56 msec    Pulm vein S/D ratio 1.14     LVOT peak abdelrahman 1.06 m/s    LVOT peak VTI 18.99 cm    Ao peak abdelrahman 1.16 m/s    Ao VTI 21.26 cm    AV peak gradient 5 mmHg    E/E' ratio 6.38 m/s    MV Peak E Abdelrahman 0.67 m/s    MV stenosis pressure 1/2 time 51.88 ms    MV Peak A Adbelrahman 0.69 m/s    PV Peak S Abdelrahman 0.41 m/s    PV Peak D Abdelrahman 0.36 m/s    LV Systolic Volume 28.58 mL    LV Systolic Volume Index 15.9 mL/m2    LV Diastolic Volume 71.72 mL    LV Diastolic Volume Index 39.84 mL/m2    LA Volume Index 14.7 mL/m2    LV Mass Index 38 g/m2    RA Major Axis 3.68 cm    Left Atrium Minor Axis 3.89 cm    Left Atrium Major Axis 4.11 cm    LA " Volume Index (Mod) 20.5 mL/m2    LA volume (mod) 36.96 cm3    RA Width 2.71 cm    Right Atrial Pressure (from IVC) 3 mmHg    EF 65 %    Narrative    · The left ventricle is normal in size with normal systolic function.  · The estimated ejection fraction is 65%.  · Normal left ventricular diastolic function.  · Normal right ventricular size with normal right ventricular systolic   function.  · Normal central venous pressure (3 mmHg).          VALERIA:  No results found for this or any previous visit.     Imaging     Active Cardiac Conditions: C/O chest pain in the past 2 weeks      Revised Cardiac Risk Index   High -Risk Surgery  Intraperitoneal; Intrathoracic; suprainguinal vascular Yes- + 1 No- 0   History of Ischemic Heart Disease   (Hx of MI/positive exercise test/current chest pain due to ischemia/use of nitrate therapy/EKG with pathological Q waves) Yes- + 1 No- 0   History of CHF  (Pulmonary edema/bilateral rales or S3 gallop/PND/CXR showing pulmonary vascular redistribution) Yes- + 1 No- 0   History of CVA   (Prior stroke or TIA) Yes- + 1 No- 0   Pre-operative treatment with insulin Yes- + 1 No- 0   Pre-operative creatinine > 2mg/dl Yes- + 1 No- 0   Total:      Risk Status:  Estimated risk of cardiac complications after non-cardiac surgery using the Revised Cardiac Risk Index for Preoperative risk is 3.9      ARISCAT (Canet) risk index: Low: 1.6% risk of post-op pulmonary complications.    American Society of Anesthesiologists Physical Status classification (ASA): 3         No further cardiac workup needed prior to surgery.      Preoperative cardiac risk assessment-  The patient does not have any active cardiac conditions . Revised cardiac risk index predictors- ---.Functional capacity is more than 4 Mets. She will be undergoing a Urological procedure that carries a Moderate Risk risk     The estimated risk of the rate of adverse cardiac outcomes  6%    No further cardiac work up is indicated prior to proceeding  "with the surgery     Orders Placed This Encounter    Ambulatory referral/consult to Cardiology       American Society of Anesthesiologists Physical status classification ( ASA ) class: 3     Postoperative pulmonary complication risk assessment: 1.6     ARISCAT ( Canet) risk index- risk class -  Low, if duration of surgery is under 3 hours, intermediate, if duration of surgery is over 3 hours     Assessment/Plan:     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 drug  Reports she has had neuropathy in her hands but not feet    Seizure-like activity  Pelon Burden MD for Gynecologic Oncology (12/6/2018  7:01 AM)    --underwent stroke eval per [facility] neuro: low suspicion for stroke  --episode may be related to seizure: neurology on board, appreciate recs  ----EEG performed - normal per neuro note  ----lacosamide dc'ed  ----repeat MRI w ctst unremarkable  --continuing electrolyte replacement PRN  --s/p speech eval: regular diet and PO meds okay - rec outpatient speech therapy  --PT/OT consult: rec outpatient therapy            Depression  Remeron currently- denies depression or SI    History of essential hypertension  Blood pressure 108/60    Urinary tract infection associated with nephrostomy catheter  Currently on Augmentin for treatment of infection- last dose today    History of DVT (deep vein thrombosis)  LE US 9/3/21 w/o evidence of DVT  - continue eliquis 5 mg BID    Metastatic squamous cell carcinoma to lymph node  Had Dr. Lemon but he retired  Has not followed up since 12/21/2020- encouraged patient to follow up for cervical cancer    Fibromyalgia  Currently taking Gabapentin and Remeron    Sleep stage dysfunction  States she is not having sleep troubles at this visit    Chest pain  C/O chest pain at this visit  optimized for surgery per Cardiology stress test negative  States she gets pain depending on what she eats and sometimes it just hurts "for no reason'  Report in the past 2 weeks she has had chest pain, " mid-sternal  Likely related to GI issues but will refer for Pre-op Cardiology clearance- cleared      Nephrostomy status  Bilateral Nephrostomy tubes - left and right    ODESSA (acute kidney injury)  GFR 39.9 BUN  20 CR 1.5  Deleterious effects NSAID's , Beneficial effects of Hydration discussed   Tylenol as needed for pain   I suggest monitoring renal function, in put and out put status socorro-operatively. I  suggest avoiding nephrotoxic medication including NSAIDs, COX2 inhibitors, intravenous contrast agent,avoiding hypotension to prevent further renal impairment.     Ileostomy in place  Present    Anemia due to chronic kidney disease  Hgb 12.7  HCT 42.6    History of cervical cancer  Had Dr. Lemon but he retired  Has not followed up since 12/21/2020- encouraged patient to follow up for cervical cancer    complicated surgical/onc history of SCC of the cervix   - status post pelvic radiation   - complicated by bilateral ureteral strictures and bilateral hydronephrosis for which she underwent ileal conduit 09/2020, postop course complicated by bowel perforation now status post colectomy with ileostomy     Moderate malnutrition  BMI 28.21    Preprocedural cardiovascular examination  Stress test negative for ischemia  Do not recommend against proceeding with nephrolithotomy from cardiac perspective  Do recommend she contact PCP/GI to discuss increased stool output from stoma    Preventive perioperative care    Thromboembolic prophylaxis:  Her risk factors for thrombosis include surgical procedure, age, and reduced mobility.I suggest  thromboembolic prophylaxis ( mechanical/pharmacological, weighing the risk benefits of pharmacological agent use considering socorro procedural bleeding )  during the perioperative period.I suggested being active in the post operative period.      Postoperative pulmonary complication prophylaxis-Risk factors for post operative pulmonary complications include ASA class >2- I suggest incentive  spirometry use, early ambulation, and pain control so as to avoid diaphragmatic splinting  Brush teeth twice per day, oral rinses, sleep with the head of the bed up 30 degrees     Renal complication prophylaxis-Risk factors for renal complications include pre-existing renal disease . I suggest keeping her well hydrated and avoidance/ minimizing the use of  NSAID's,JACOBSON 2 Inhibitors ,IV contrast if possible in the perioperative period.I suggested drinking 2 litre's of water a day      Surgical site Infection Prophylaxis-I  suggest appropriate antibiotic for Prophylaxis against Surgical site infections Shower with Hibiclens in the night before surgery and the morning of surgery     This visit was focused on Preoperative evaluation, Perioperative Medical management, complication reduction plans. I suggest that the patient follows up with primary care or relevant sub specialists for ongoing health care.    I appreciate the opportunity to be involved in this patients care. Please feel free to contact me if there were any questions about this consultation.    Patient is optimized    Cardiology clearance 10/14/22-- negative stress test; optimized for surgery    Cordell George NP  Perioperative Medicine  [facility]   Pager [phone]

## 2022-10-10 NOTE — ASSESSMENT & PLAN NOTE
"C/O chest pain at this visit  optimized for surgery per Cardiology stress test negative  States she gets pain depending on what she eats and sometimes it just hurts "for no reason'  Report in the past 2 weeks she has had chest pain, mid-sternal  Likely related to GI issues but will refer for Pre-op Cardiology clearance- cleared    "

## 2022-10-13 ENCOUNTER — TELEPHONE (OUTPATIENT)
Dept: CARDIOLOGY | Facility: CLINIC | Age: 57
End: 2022-10-13
Payer: MEDICAID

## 2022-10-13 NOTE — TELEPHONE ENCOUNTER
Spoke with [facility].  Patient contacted them to schedule transportation for appointment scheduled tomorrow.  Patient is out of the 8 hour prior notice and [facility] needed confirmation that appointment is urgent.  Advised [facility], appointment is for pre op cardiac clearance.  [facility] will contact patient ans approve transportation.

## 2022-10-13 NOTE — TELEPHONE ENCOUNTER
----- Message from Raheem Ivey sent at 10/13/2022 10:18 AM CDT -----  Name Of Caller:Papi            Provider Name:Sherlyn Valle            Does patient feel the need to be seen today?No            Relationship to the Pt?:[facility]            Contact Preference?:[phone]            What is the nature of the call?: Papi would like to receive a phone call to book Transportation for patient.

## 2022-10-14 ENCOUNTER — OFFICE VISIT (OUTPATIENT)
Dept: CARDIOLOGY | Facility: CLINIC | Age: 57
End: 2022-10-14
Payer: MEDICAID

## 2022-10-14 VITALS
WEIGHT: 189.81 LBS | DIASTOLIC BLOOD PRESSURE: 53 MMHG | OXYGEN SATURATION: 96 % | BODY MASS INDEX: 28.11 KG/M2 | SYSTOLIC BLOOD PRESSURE: 109 MMHG | HEART RATE: 80 BPM | HEIGHT: 69 IN

## 2022-10-14 DIAGNOSIS — Z86.79 HISTORY OF ESSENTIAL HYPERTENSION: ICD-10-CM

## 2022-10-14 DIAGNOSIS — R06.09 DYSPNEA ON EXERTION: Primary | ICD-10-CM

## 2022-10-14 DIAGNOSIS — I20.89 ANGINAL EQUIVALENT: ICD-10-CM

## 2022-10-14 DIAGNOSIS — Z13.6 ENCOUNTER FOR SCREENING FOR CARDIOVASCULAR DISORDERS: ICD-10-CM

## 2022-10-14 DIAGNOSIS — R07.89 OTHER CHEST PAIN: ICD-10-CM

## 2022-10-14 PROCEDURE — 93005 ELECTROCARDIOGRAM TRACING: CPT | Mod: PBBFAC,PN | Performed by: INTERNAL MEDICINE

## 2022-10-14 PROCEDURE — 3074F SYST BP LT 130 MM HG: CPT | Mod: CPTII,,, | Performed by: NURSE PRACTITIONER

## 2022-10-14 PROCEDURE — 99214 PR OFFICE/OUTPT VISIT, EST, LEVL IV, 30-39 MIN: ICD-10-PCS | Mod: S$PBB,25,, | Performed by: NURSE PRACTITIONER

## 2022-10-14 PROCEDURE — 93010 ELECTROCARDIOGRAM REPORT: CPT | Mod: S$PBB,,, | Performed by: INTERNAL MEDICINE

## 2022-10-14 PROCEDURE — 1160F RVW MEDS BY RX/DR IN RCRD: CPT | Mod: CPTII,,, | Performed by: NURSE PRACTITIONER

## 2022-10-14 PROCEDURE — 3078F PR MOST RECENT DIASTOLIC BLOOD PRESSURE < 80 MM HG: ICD-10-PCS | Mod: CPTII,,, | Performed by: NURSE PRACTITIONER

## 2022-10-14 PROCEDURE — 99999 PR PBB SHADOW E&M-EST. PATIENT-LVL IV: CPT | Mod: PBBFAC,,, | Performed by: NURSE PRACTITIONER

## 2022-10-14 PROCEDURE — 1159F MED LIST DOCD IN RCRD: CPT | Mod: CPTII,,, | Performed by: NURSE PRACTITIONER

## 2022-10-14 PROCEDURE — 99214 OFFICE O/P EST MOD 30 MIN: CPT | Mod: PBBFAC,PN | Performed by: NURSE PRACTITIONER

## 2022-10-14 PROCEDURE — 99214 OFFICE O/P EST MOD 30 MIN: CPT | Mod: S$PBB,25,, | Performed by: NURSE PRACTITIONER

## 2022-10-14 PROCEDURE — 1159F PR MEDICATION LIST DOCUMENTED IN MEDICAL RECORD: ICD-10-PCS | Mod: CPTII,,, | Performed by: NURSE PRACTITIONER

## 2022-10-14 PROCEDURE — 3078F DIAST BP <80 MM HG: CPT | Mod: CPTII,,, | Performed by: NURSE PRACTITIONER

## 2022-10-14 PROCEDURE — 1160F PR REVIEW ALL MEDS BY PRESCRIBER/CLIN PHARMACIST DOCUMENTED: ICD-10-PCS | Mod: CPTII,,, | Performed by: NURSE PRACTITIONER

## 2022-10-14 PROCEDURE — 93010 EKG 12-LEAD: ICD-10-PCS | Mod: S$PBB,,, | Performed by: INTERNAL MEDICINE

## 2022-10-14 PROCEDURE — 3074F PR MOST RECENT SYSTOLIC BLOOD PRESSURE < 130 MM HG: ICD-10-PCS | Mod: CPTII,,, | Performed by: NURSE PRACTITIONER

## 2022-10-14 PROCEDURE — 99999 PR PBB SHADOW E&M-EST. PATIENT-LVL IV: ICD-10-PCS | Mod: PBBFAC,,, | Performed by: NURSE PRACTITIONER

## 2022-10-14 NOTE — PROGRESS NOTES
"        Cardiology    10/14/2022  11:21 AM    Problem list  Patient Active Problem List   Diagnosis    Cervical cancer    Osteoarthritis of back    History of essential hypertension    Lower extremity pain, diffuse    Weakness of both lower extremities    Impaired functional mobility, balance, gait, and endurance    Colon polyp    Internal hemorrhoids    Severe episode of recurrent major depressive disorder, with psychotic features    Fibromyalgia    Sleep stage dysfunction    Carpal tunnel syndrome on right    History of cervical cancer    Metastatic squamous cell carcinoma to lymph node    Generalized anxiety disorder    PTSD (post-traumatic stress disorder)    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 drug    Seizure-like activity    ODESSA (acute kidney injury)    Anemia due to chronic kidney disease    Pyelonephritis    Hydronephrosis    Family history of colon cancer    Abnormal mammogram    Radiation cystitis    Bilateral ureteral obstruction    Moderate malnutrition    Anemia due to chronic blood loss    Vitamin D deficiency    Ileostomy in place    History of DVT (deep vein thrombosis)    Presence of urostomy    QT prolongation    Metabolic acidosis    Hard to intubate    Moderate episode of recurrent major depressive disorder    Nephrostomy status    Depression    Physical deconditioning    Urinary tract infection associated with nephrostomy catheter    Chest pain       CC:  Chest pain    HPI:  Has episodes of CP associated with dizziness, has syncopal episodes.  Last episode of CP was last week.  Chest pain is midsternal and "mostly travels into stomach".   No personal hx of cardiac disease, both parents  of cancer. Get's short of breath with pain, pain is "very bad" and "hurts" affects appetite.  Pain not worsened by anything, pain is improved by sprite and water, "a good belch helps". Has acid reflux, pain she is having is similar to the sensation of acid reflux.    Taking toradol- has been experiecing chest " "pain "since I had these bags on" which is close to 4 years. Has been told that her CP is due to eating too fast or too much. Has not seen PCP in a "while" trying to get caught up since pandemic  No recent lipid panel available.  Has had surgery since her chest pain started.   Was on BP meds in past but has not needed them in some time.  BP stays low and that seems to keep her hospitalized.  She also has lots of stress.  Has swelling in her legs, unable to lie flat but this may be due to the nephrostomy tubes  Has worsening Wasserman  Has terrible abd pain due to ileostomy  Has been on abx frequently over last few months.   No tobacco, no ETOH, no caffeine. She drinks sprite because this helps her stomach.       Medications  Current Outpatient Medications   Medication Sig Dispense Refill    acetaminophen (TYLENOL) 325 MG tablet Take 2 tablets (650 mg total) by mouth 3 (three) times daily.  0    gabapentin (NEURONTIN) 100 MG capsule Take 2 capsules (200 mg total) by mouth every evening. 90 capsule 3    ketorolac (TORADOL) 10 mg tablet Take 1 tablet (10 mg total) by mouth every 6 (six) hours as needed for Pain. 30 tablet 0    loperamide (IMODIUM A-D) 2 mg Tab Take 1 tablet (2 mg total) by mouth 4 (four) times daily as needed (diarrhea). 120 tablet 0    melatonin (MELATIN) 3 mg tablet Take 2 tablets (6 mg total) by mouth nightly as needed for Insomnia. 60 tablet 0    mirtazapine (REMERON SOL-TAB) 15 MG disintegrating tablet Dissolve 1 tablet (15 mg total) by mouth nightly. 30 tablet 11    sodium bicarbonate 650 MG tablet Take 2 tablets (1,300 mg total) by mouth 2 (two) times daily. 120 tablet 11    sucralfate (CARAFATE) 1 gram tablet Take 1 tablet (1 g total) by mouth 4 (four) times daily before meals and nightly. 120 tablet 2     No current facility-administered medications for this visit.      Prior to Admission medications    Medication Sig Start Date End Date Taking? Authorizing Provider   acetaminophen (TYLENOL) 325 MG " tablet Take 2 tablets (650 mg total) by mouth 3 (three) times daily. 9/1/22  Yes Danuta Barboza MD   gabapentin (NEURONTIN) 100 MG capsule Take 2 capsules (200 mg total) by mouth every evening. 9/1/22  Yes Danuta Barboza MD   ketorolac (TORADOL) 10 mg tablet Take 1 tablet (10 mg total) by mouth every 6 (six) hours as needed for Pain. 9/8/22  Yes Denise Brooks NP   loperamide (IMODIUM A-D) 2 mg Tab Take 1 tablet (2 mg total) by mouth 4 (four) times daily as needed (diarrhea). 9/22/22 12/21/22 Yes Addie Mathews NP   melatonin (MELATIN) 3 mg tablet Take 2 tablets (6 mg total) by mouth nightly as needed for Insomnia. 9/6/21  Yes Kacey Bergeron MD   mirtazapine (REMERON SOL-TAB) 15 MG disintegrating tablet Dissolve 1 tablet (15 mg total) by mouth nightly. 2/15/22 2/15/23 Yes Diony Ram MD   sodium bicarbonate 650 MG tablet Take 2 tablets (1,300 mg total) by mouth 2 (two) times daily. 2/15/22 2/15/23 Yes Diony Ram MD   sucralfate (CARAFATE) 1 gram tablet Take 1 tablet (1 g total) by mouth 4 (four) times daily before meals and nightly. 9/1/22  Yes Danuta Barboza MD         History  Past Medical History:   Diagnosis Date    Abnormal mammogram 08/25/2020    Acute blood loss anemia     Acute deep vein thrombosis (DVT) of lower extremity 12/09/2020    Advance care planning 04/30/2021    Anemia due to chronic blood loss     Anemia due to chronic kidney disease     Anxiety     Cardiovascular event risk -- low 09/14/2015    ASCVD 10-year risk 1.9% (with optimal risk factors 1.3%) as of 9/14/2015     Cervical cancer 2014    Chronic back pain     Colostomy care     Deep vein thrombosis     Depression     Diarrhea due to malabsorption 11/14/2018    Diarrhea due to malabsorption 11/14/2018    Difficult intubation     Disorder of kidney and ureter     DVT of lower extremity, bilateral 11/04/2020    Fibromyalgia     Fungemia 09/27/2020    Generalized abdominal pain 08/25/2020    GERD  (gastroesophageal reflux disease)     Hemifacial spasm 09/16/2015    Hiatal hernia 2014    History of cervical cancer 10/11/2018    Hx of psychiatric care     Cymbalta, trazodone    Hypertension     Hypomagnesemia 11/21/2018    Lactose intolerance     Metastatic squamous cell carcinoma to lymph node 10/11/2018    Nephrostomy tube displaced     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 Bx were negative.     Seizures     Urinary tract infection associated with nephrostomy catheter 06/11/2020    Wound infection after surgery 09/24/2020     Past Surgical History:   Procedure Laterality Date    ANTEGRADE NEPHROSTOGRAPHY Bilateral 9/28/2020    Procedure: Nephrostogram - antegrade;  Surgeon: Leslie Balbuena MD;  Location: [facility] OR [address] [place] [address];  Service: Urology;  Laterality: Bilateral;    ANTEGRADE NEPHROSTOGRAPHY Left 4/20/2021    Procedure: NEPHROSTOGRAM, ANTEGRADE;  Surgeon: Leslie Balbuena MD;  Location: [facility] OR [address] [place] [address];  Service: Urology;  Laterality: Left;    BILATERAL OOPHORECTOMY  2015    CHOLECYSTECTOMY  11/09/2016    Done at [facility], path showed chronic cholecystitis and gallstones    cold knife conization  2014    COLECTOMY, RIGHT  9/17/2020    Procedure: COLECTOMY, RIGHT Extended;  Surgeon: Hammad Reynolds MD;  Location: [facility] OR 2ND FLR;  Service: Colon and Rectal;;    COLONOSCOPY  2014    [facility]    COLONOSCOPY N/A 5/18/2018    Procedure: COLONOSCOPY;  Surgeon: Arden Gutiérrez MD;  Location: [facility] (4TH FLR);  Service: Endoscopy;  Laterality: N/A;    COLONOSCOPY N/A 7/28/2020    Procedure: COLONOSCOPY;  Surgeon: Hammad Reynolds MD;  Location: [facility] ENDO (4TH FLR);  Service: Colon and Rectal;  Laterality: N/A;  covid test [place] 7/25     CYSTOSCOPY WITH URETEROSCOPY, RETROGRADE PYELOGRAPHY, AND INSERTION OF STENT Bilateral 3/21/2020    Procedure: CYSTOSCOPY, WITH RETROGRADE PYELOGRAM,;  Surgeon: Leslie Balbuena MD;  Location: [facility] OR 1ST FLR;  Service: Urology;  Laterality: Bilateral;    ESOPHAGOGASTRODUODENOSCOPY  2014    ESOPHAGOGASTRODUODENOSCOPY  11/18/2020    ESOPHAGOGASTRODUODENOSCOPY N/A 11/18/2020    Procedure: ESOPHAGOGASTRODUODENOSCOPY (EGD);  Surgeon: Zenon Spencer MD;  Location: [facility] ENDO;  Service: Endoscopy;  Laterality: N/A;    ESOPHAGOGASTRODUODENOSCOPY N/A 12/11/2020    Procedure: EGD (ESOPHAGOGASTRODUODENOSCOPY);  Surgeon: Juancho Muse MD;  Location: [facility] (2ND FLR);  Service: Endoscopy;  Laterality: N/A;    HYSTERECTOMY  2014    [facility] for cervical cancer    ILEOSTOMY  9/17/2020    Procedure: CREATION, ILEOSTOMY;  Surgeon: Hammad Reynolds MD;  Location: [facility] OR 2ND FLR;  Service: Colon and Rectal;;    LOOPOGRAM N/A 4/20/2021    Procedure: LOOPOGRAM;  Surgeon: Leslie Balbuena MD;  Location: [facility] OR 1ST FLR;  Service: Urology;  Laterality: N/A;    MOBILIZATION OF SPLENIC FLEXURE N/A 9/11/2020    Procedure: MOBILIZATION, COLONIC;  Surgeon: Hammad Reynolds MD;  Location: [facility] OR 2ND FLR;  Service: Colon and Rectal;  Laterality: N/A;    NEPHROSTOGRAPHY Bilateral 4/17/2021    Procedure: Nephrostogram;  Surgeon: Celeste Surgeon;  Location: [facility] CELESTE;  Service: Anesthesiology;  Laterality: Bilateral;    OOPHORECTOMY      RETROPERITONEAL LYMPHADENECTOMY Right 9/17/2018    Procedure: LYMPHADENECTOMY, RETROPERITONEUM;  Surgeon: Sebas Reed MD;  Location: [facility] OR 2ND FLR;  Service: General;  Laterality: Right;  ILIAC     Social History     Socioeconomic History    Marital status:      Spouse name: Hammad    Number of children: 2   Tobacco Use    Smoking status: Former    Smokeless tobacco: Never   Substance and Sexual Activity    Alcohol use: No     Alcohol/week: 0.0 standard drinks    Drug use: No    Sexual  activity: Yes     Partners: Male     Birth control/protection: None     Comment:  19 years since    Social History Narrative    , twin daughters (1  2018), disabled due to childhood stroke, Mandaeism sophia     Social Determinants of Health     Financial Resource Strain: Low Risk     Difficulty of Paying Living Expenses: Not very hard   Food Insecurity: No Food Insecurity    Worried About Running Out of Food in the Last Year: Never true    Ran Out of Food in the Last Year: Never true   Transportation Needs: No Transportation Needs    Lack of Transportation (Medical): No    Lack of Transportation (Non-Medical): No   Physical Activity: Inactive    Days of Exercise per Week: 0 days    Minutes of Exercise per Session: 0 min   Stress: No Stress Concern Present    Feeling of Stress : Only a little   Social Connections: Moderately Isolated    Frequency of Communication with Friends and Family: More than three times a week    Frequency of Social Gatherings with Friends and Family: More than three times a week    Attends Muslim Services: Never    Active Member of Clubs or Organizations: No    Attends Club or Organization Meetings: Never    Marital Status:    Housing Stability: Low Risk     Unable to Pay for Housing in the Last Year: No    Number of Places Lived in the Last Year: 1    Unstable Housing in the Last Year: No         Allergies  Review of patient's allergies indicates:   Allergen Reactions    Bee sting [allergen ext-venom-honey bee]      Rash      Grass pollen-bermuda, standard      rash         Review of Systems   Review of Systems   Constitutional: Negative for diaphoresis and malaise/fatigue.   HENT: Negative.     Cardiovascular:  Positive for chest pain, dyspnea on exertion and leg swelling. Negative for claudication, irregular heartbeat, near-syncope, orthopnea, palpitations, paroxysmal nocturnal dyspnea and syncope.   Respiratory:  Negative for shortness of  breath.    Endocrine: Negative for polydipsia, polyphagia and polyuria.   Hematologic/Lymphatic: Does not bruise/bleed easily.   Gastrointestinal:  Negative for bloating, nausea and vomiting.   Genitourinary: Negative.    Neurological:  Negative for excessive daytime sleepiness, dizziness, light-headedness, loss of balance and weakness.   Psychiatric/Behavioral:  The patient is not nervous/anxious.    Allergic/Immunologic: Negative.        Physical Exam  Wt Readings from Last 1 Encounters:   10/14/22 86.1 kg (189 lb 13.1 oz)     BP Readings from Last 3 Encounters:   10/14/22 (!) 109/53   10/10/22 108/60   09/28/22 102/71     Pulse Readings from Last 1 Encounters:   10/14/22 80     Body mass index is 28.03 kg/m².    Physical Exam  Vitals and nursing note reviewed.   Constitutional:       Appearance: Normal appearance.   HENT:      Head: Normocephalic and atraumatic.      Mouth/Throat:      Mouth: Mucous membranes are moist.   Eyes:      Pupils: Pupils are equal, round, and reactive to light.   Cardiovascular:      Rate and Rhythm: Normal rate and regular rhythm.      Pulses:           Radial pulses are 2+ on the right side and 2+ on the left side.        Dorsalis pedis pulses are 2+ on the right side and 2+ on the left side.        Posterior tibial pulses are 2+ on the right side and 2+ on the left side.      Heart sounds: No murmur heard.  Pulmonary:      Effort: Pulmonary effort is normal. No respiratory distress.      Breath sounds: Normal breath sounds.   Abdominal:      General: There is no distension.      Tenderness: There is no abdominal tenderness.   Musculoskeletal:      Cervical back: Normal range of motion.      Right lower leg: Edema present.      Left lower leg: Edema present.   Skin:     General: Skin is warm and dry.      Findings: No erythema.   Neurological:      General: No focal deficit present.      Mental Status: She is alert.   Psychiatric:         Mood and Affect: Mood normal.          "Behavior: Behavior normal.             Problem List Items Addressed This Visit          Cardiac/Vascular    History of essential hypertension    Overview     Not requiring medications at this time         Chest pain    Overview     Unclear etiology  Has "mild" aortic calcifications on previous chest imaging  Chest pain has been ongoing for sometime- difficult to discern if pain is due to GERD or if it correlates with a cardiac cause.  Recommend stress test to assess          Current Assessment & Plan     As above         Relevant Orders    IN OFFICE EKG 12-LEAD (to Muse)    Dyspnea on exertion - Primary    Overview     Concern for anginal equivalent  Recommend nuclear stress           Current Assessment & Plan     As above         Relevant Orders    LIPID PANEL    Nuclear Stress Test    Echo     Other Visit Diagnoses       Encounter for screening for cardiovascular disorders        Relevant Orders    NM Myocardial Perfusion Spect Multi Pharmacologic    Anginal equivalent        Relevant Orders    Nuclear Stress Test          Has been referred for preprocedural eval prior to upcoming procedure with Dr. Russ.  Await results of stress and echo for full recommendation about medical optimization.    Follow Up  10 days      @Sherlyn Valle DNP      "

## 2022-10-14 NOTE — PATIENT INSTRUCTIONS
We will get you set up for a stress test and an echo    We will see you back in about a week and a half    We will check your cholesterol

## 2022-10-15 ENCOUNTER — HOSPITAL ENCOUNTER (EMERGENCY)
Facility: HOSPITAL | Age: 57
Discharge: HOME | End: 2022-10-15
Attending: EMERGENCY MEDICINE
Payer: MEDICAID

## 2022-10-15 VITALS
WEIGHT: 189 LBS | OXYGEN SATURATION: 99 % | RESPIRATION RATE: 18 BRPM | HEART RATE: 80 BPM | TEMPERATURE: 98 F | SYSTOLIC BLOOD PRESSURE: 114 MMHG | BODY MASS INDEX: 27.99 KG/M2 | HEIGHT: 69 IN | DIASTOLIC BLOOD PRESSURE: 76 MMHG

## 2022-10-15 DIAGNOSIS — K94.02: ICD-10-CM

## 2022-10-15 DIAGNOSIS — Z71.89 ENCOUNTER FOR OSTOMY CARE EDUCATION: Primary | ICD-10-CM

## 2022-10-15 DIAGNOSIS — L03.311: ICD-10-CM

## 2022-10-15 PROCEDURE — 99284 EMERGENCY DEPT VISIT MOD MDM: CPT | Mod: ,,, | Performed by: PHYSICIAN ASSISTANT

## 2022-10-15 PROCEDURE — 99284 PR EMERGENCY DEPT VISIT,LEVEL IV: ICD-10-PCS | Mod: ,,, | Performed by: PHYSICIAN ASSISTANT

## 2022-10-15 PROCEDURE — 25000003 PHARM REV CODE 250: Performed by: PHYSICIAN ASSISTANT

## 2022-10-15 PROCEDURE — 99283 EMERGENCY DEPT VISIT LOW MDM: CPT

## 2022-10-15 RX ORDER — LIDOCAINE HYDROCHLORIDE 20 MG/ML
10 INJECTION, SOLUTION INFILTRATION; PERINEURAL
Status: COMPLETED | OUTPATIENT
Start: 2022-10-15 | End: 2022-10-15

## 2022-10-15 RX ORDER — CEPHALEXIN 500 MG/1
500 CAPSULE ORAL 4 TIMES DAILY
Qty: 20 CAPSULE | Refills: 0 | Status: SHIPPED | OUTPATIENT
Start: 2022-10-15 | End: 2022-10-20

## 2022-10-15 RX ADMIN — LIDOCAINE HYDROCHLORIDE 10 ML: 20 INJECTION, SOLUTION INFILTRATION; PERINEURAL at 01:10

## 2022-10-15 NOTE — PLAN OF CARE
[facility] - Emergency Dept      HOME HEALTH ORDERS  FACE TO FACE ENCOUNTER    Patient Name: Edita Riley  YOB: 1965    PCP: Primary Doctor No   PCP Address: None  PCP Phone Number: None  PCP Fax: None    Encounter Date: 10/15/22    Admit to Home Health    Diagnoses:  There are no hospital problems to display for this patient.      Follow Up Appointments:  Future Appointments   Date Time Provider Department Center   10/20/2022 10:45 AM LAB, [facility] LAB [facility]   10/20/2022 11:00 AM [facility] NM1 [facility]   10/20/2022 12:30 PM [facility] NM1 [facility]   10/20/2022  1:00 PM CV [facility] STRESS LAB [facility]   10/20/2022  1:15 PM CV [facility] ECHO/EKG [facility]   10/24/2022 11:30 AM Sherlyn Valle DNP [facility]   10/31/2022 10:30 AM [facility] [id number] [facility] RAD IR [facility]       Allergies:  Review of patient's allergies indicates:   Allergen Reactions    Bee sting [allergen ext-venom-honey bee]      Rash      Grass pollen-bermuda, standard      rash       Medications: Review discharge medications with patient and family and provide education.      I have seen and examined this patient within the last 30 days. My clinical findings that support the need for the home health skilled services and home bound status are the following:no   Patient with medication mismanagement issues requiring home bound status as evidenced by  Poor understanding of medication regimen/dosage.     Diet:   no change    Labs:  Report Lab results to PCP.    Referrals/ Consults  Aide to provide assistance with personal care, ADLs, and vital signs.    Activities:   activity as tolerated    Nursing:   Agency to admit patient within 24 hours of hospital discharge unless specified on physician order or at patient request    SN to complete comprehensive assessment including routine vital signs. Instruct on disease process and s/s of complications to report to MD.  Review/verify medication list sent home with the patient at time of discharge  and instruct patient/caregiver as needed. Frequency may be adjusted depending on start of care date.     Skilled nurse to perform up to 3 visits PRN for symptoms related to diagnosis    Notify MD if SBP > 160 or < 90; DBP > 90 or < 50; HR > 120 or < 50; Temp > 101; O2 < 88%; Other:       Ok to schedule additional visits based on staff availability and patient request on consecutive days within the home health episode.    Miscellaneous   Colostomy Care:  Change and clean site every 48 hours  Patient needs assistance with changing and cleaning colostomy site.     Home Health Aide:  Home Health Aide Every other day    Wound Care Orders  no    I certify that this patient is confined to her home and needs intermittent skilled nursing care.

## 2022-10-15 NOTE — ED TRIAGE NOTES
The pt is a 57-year-old female who presents to the ED from home via personal transportation. Pt states that both of her stomas are bothering her and she presents to the ED simply for refills.

## 2022-10-15 NOTE — ED NOTES
Pt abdomen cleaned and changed into hospital gown. New urostomy and ileostomy pouch applied to pt.

## 2022-10-15 NOTE — ED PROVIDER NOTES
Encounter Date: 10/15/2022       History     Chief Complaint   Patient presents with    Refill Coordination     Need more ostomy bags     The history is provided by the patient and medical records. No  was used.     Edita Riley is a 57 y.o. female with medical history of cervical cancer s/p surgery and pelvic radiation, open transverse colon conduit urinary diversion on 9/11/2020 complicated by bowel perforation s/p take back for hemicolectomy and ileostomy creation (9/17/20), NL nephrostomy tubes, Hx of DVT presenting to the ED with the chief complaint of ostomy care.     Reports running out of her colostomy bag a few days ago. She has been using her urostomy bags as replacements and now needs more urostomy bags as well. Reports having increased redness around her colostomy stoma. Denies fever, chest pain, SOB, abdominal pain, vomiting, urinary or bowel movement changes. Reports her  assists her with her dressing changes. She has been seen in the ED for colostomy bag replacements several times before in the past.     Review of patient's allergies indicates:   Allergen Reactions    Bee sting [allergen ext-venom-honey bee]      Rash      Grass pollen-bermuda, standard      rash     Past Medical History:   Diagnosis Date    Abnormal mammogram 08/25/2020    Acute blood loss anemia     Acute deep vein thrombosis (DVT) of lower extremity 12/09/2020    Advance care planning 04/30/2021    Anemia due to chronic blood loss     Anemia due to chronic kidney disease     Anxiety     Cardiovascular event risk -- low 09/14/2015    ASCVD 10-year risk 1.9% (with optimal risk factors 1.3%) as of 9/14/2015     Cervical cancer 2014    Chronic back pain     Colostomy care     Deep vein thrombosis     Depression     Diarrhea due to malabsorption 11/14/2018    Diarrhea due to malabsorption 11/14/2018    Difficult intubation     Disorder of kidney and ureter     DVT of lower extremity, bilateral  11/04/2020    Fibromyalgia     Fungemia 09/27/2020    Generalized abdominal pain 08/25/2020    GERD (gastroesophageal reflux disease)     Hemifacial spasm 09/16/2015    Hiatal hernia 2014    History of cervical cancer 10/11/2018    Hx of psychiatric care     Cymbalta, trazodone    Hypertension     Hypomagnesemia 11/21/2018    Lactose intolerance     Metastatic squamous cell carcinoma to lymph node 10/11/2018    Nephrostomy tube displaced     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 Bx were negative.     Seizures     Urinary tract infection associated with nephrostomy catheter 06/11/2020    Wound infection after surgery 09/24/2020     Past Surgical History:   Procedure Laterality Date    ANTEGRADE NEPHROSTOGRAPHY Bilateral 9/28/2020    Procedure: Nephrostogram - antegrade;  Surgeon: Leslie Balbuena MD;  [facility];  Service: Urology;  Laterality: Bilateral;    ANTEGRADE NEPHROSTOGRAPHY Left 4/20/2021    Procedure: NEPHROSTOGRAM, ANTEGRADE;  Surgeon: Leslie Balbuena MD;  Location: [facility] OR 1ST FLR;  Service: Urology;  Laterality: Left;    BILATERAL OOPHORECTOMY  2015    CHOLECYSTECTOMY  11/09/2016    Done at [facility], path showed chronic cholecystitis and gallstones    cold knife conization  2014    COLECTOMY, RIGHT  9/17/2020    Procedure: COLECTOMY, RIGHT Extended;  Surgeon: Hammad Reynolds MD;  Location: [facility] OR 2ND FLR;  Service: Colon and Rectal;;    COLONOSCOPY  2014    [facility]    COLONOSCOPY N/A 5/18/2018    Procedure: COLONOSCOPY;  Surgeon: Arden Gutiérrez MD;  Location: [facility] (4TH FLR);  Service: Endoscopy;  Laterality: N/A;    COLONOSCOPY N/A 7/28/2020    Procedure: COLONOSCOPY;  Surgeon: Hammad Reynolds MD;   Location: [facility] (4TH FLR);  Service: Colon and Rectal;  Laterality: N/A;  covid test [place] 7/25    CYSTOSCOPY WITH URETEROSCOPY, RETROGRADE PYELOGRAPHY, AND INSERTION OF STENT Bilateral 3/21/2020    Procedure: CYSTOSCOPY, WITH RETROGRADE PYELOGRAM,;  Surgeon: Leslie Balbuena MD;  Location: [facility] OR 1ST FLR;  Service: Urology;  Laterality: Bilateral;    ESOPHAGOGASTRODUODENOSCOPY  2014    ESOPHAGOGASTRODUODENOSCOPY  11/18/2020    ESOPHAGOGASTRODUODENOSCOPY N/A 11/18/2020    Procedure: ESOPHAGOGASTRODUODENOSCOPY (EGD);  Surgeon: Zenon Spencer MD;  Location: [facility] ENDO;  Service: Endoscopy;  Laterality: N/A;    ESOPHAGOGASTRODUODENOSCOPY N/A 12/11/2020    Procedure: EGD (ESOPHAGOGASTRODUODENOSCOPY);  Surgeon: Juancho Muse MD;  Location: [facility] END[facility] (2ND FLR);  Service: Endoscopy;  Laterality: N/A;    HYSTERECTOMY  2014    [facility] for cervical cancer    ILEOSTOMY  9/17/2020    Procedure: CREATION, ILEOSTOMY;  Surgeon: Hammad Reynolds MD;  Location: [facility] OR 2ND FLR;  Service: Colon and Rectal;;    LOOPOGRAM N/A 4/20/2021    Procedure: LOOPOGRAM;  Surgeon: Leslie Balbuena MD;  Location: [facility] OR 1ST FLR;  Service: Urology;  Laterality: N/A;    MOBILIZATION OF SPLENIC FLEXURE N/A 9/11/2020    Procedure: MOBILIZATION, COLONIC;  Surgeon: Hammad Reynolds MD;  Location: [facility] OR 2ND FLR;  Service: Colon and Rectal;  Laterality: N/A;    NEPHROSTOGRAPHY Bilateral 4/17/2021    Procedure: Nephrostogram;  Surgeon: Celeste Surgeon;  Location: [facility];  Service: Anesthesiology;  Laterality: Bilateral;    OOPHORECTOMY      RETROPERITONEAL LYMPHADENECTOMY Right 9/17/2018    Procedure: LYMPHADENECTOMY, RETROPERITONEUM;  Surgeon: Sebas Reed MD;  Location: [facility] OR 2ND FLR;  Service: General;  Laterality: Right;  ILIAC     Family History   Problem Relation Age of Onset    Heart disease Sister     Heart disease Maternal Grandmother     Colon cancer Father     Esophageal cancer Father     Cancer Father         Lung-smoker     Cancer Mother         Cervical    Cervical cancer Mother     Breast cancer Maternal Aunt     Suicide Daughter         jumped from parking structure    Drug abuse Daughter     Drug abuse Daughter     Rectal cancer Neg Hx     Stomach cancer Neg Hx     Crohn's disease Neg Hx     Ulcerative colitis Neg Hx     Diabetes Neg Hx     Hypertension Neg Hx      Social History     Tobacco Use    Smoking status: Former    Smokeless tobacco: Never   Substance Use Topics    Alcohol use: No     Alcohol/week: 0.0 standard drinks    Drug use: No     Review of Systems   Constitutional:  Negative for fever.   HENT:  Negative for sore throat.    Eyes:  Negative for pain.   Respiratory:  Negative for shortness of breath.    Cardiovascular:  Negative for chest pain.   Gastrointestinal:  Negative for nausea.   Genitourinary:  Negative for dysuria.   Musculoskeletal:  Negative for back pain.   Skin:  Positive for wound. Negative for rash.   Neurological:  Negative for weakness.   Hematological:  Does not bruise/bleed easily.     Physical Exam     Initial Vitals [10/15/22 1220]   BP Pulse Resp Temp SpO2   114/62 93 19 97.8 °F (36.6 °C) 97 %      MAP       --         Physical Exam    Constitutional: She appears well-developed and well-nourished. She is not diaphoretic. No distress.   HENT:   Head: Normocephalic and atraumatic.   Mouth/Throat: Oropharynx is clear and moist. No oropharyngeal exudate.   Eyes: Conjunctivae and EOM are normal. Pupils are equal, round, and reactive to light. No scleral icterus.   Neck: Neck supple.   Normal range of motion.  Cardiovascular:  Normal rate and regular rhythm.           Pulmonary/Chest: Breath sounds normal. No respiratory distress. She has no wheezes.   Abdominal: Abdomen is soft. She exhibits no distension. There is no abdominal tenderness.   Colostomy stoma RLQ with surrounding erythema. Small pinpoint bleeding to skin inferior to the stoma. No colostomy bag in place.  Urostomy bag partially  attached to LLQ  BL nephrostomy tubes in place. R nephrostomy tube anchoring stitch is displaced. No dressings in place.   There is no rebound.   Musculoskeletal:         General: No tenderness or edema. Normal range of motion.      Cervical back: Normal range of motion and neck supple.     Neurological: She is alert and oriented to person, place, and time. She has normal strength. No sensory deficit.   Skin: Skin is warm and dry. No rash noted. No erythema.   Psychiatric: She has a normal mood and affect.       ED Course   Procedures  Labs Reviewed   HIV 1 / 2 ANTIBODY   HEPATITIS C ANTIBODY          Imaging Results    None          Medications   LIDOcaine HCL 20 mg/ml (2%) injection 10 mL (10 mLs Infiltration Given by Other 10/15/22 4803)     Medical Decision Making:   History:   Old Medical Records: I decided to obtain old medical records.  Old Records Summarized: records from clinic visits and records from previous admission(s).     APC / Resident Notes:   57 y.o. female with medical history of cervical cancer s/p surgery and pelvic radiation, open transverse colon conduit urinary diversion on 9/11/2020 complicated by bowel perforation s/p take back for hemicolectomy and ileostomy creation (9/17/20), NL nephrostomy tubes, Hx of DVT presenting to the ED for ostomy care. DDx includes but not limited to medical supply replacement, stoma dermatitis, stoma cellulitis. Afebrile and non-toxic appearing. No systemic infectious symptoms. Do not feel labs or imaging warranted at this time. Patient would not be in the ED otherwise besides for colostomy bag replacement.     Patient cleaned and given blue scrubs to change into. Urostomy and Colostomy replaced in the ED. Discussed importance of hygiene and her risks of infection with the patient at bedside. Surrounding skin to colostomy stoma concerning for developing cellulitis. RX for Keflex provided for treatment. Social work consulted regarding frequent ED visits for  colostomy bags. Will attempt to setup home health for the patient, but not likely to get approved as she has medicaid. Patient only getting 12 colostomy bags per month from her current order, but seems to be using at least 1 per day. Will need to follow-up with her CRS team regarding obtaining more bags with her monthly order. Additionally replaced R nephrostomy anchoring stitch and BL dressings. Advised patient to follow-up with PCP and CRS team. Patient expresses understanding and agreeable to the plan. Return to ED precautions given for new, worsening, or concerning symptoms. I have discussed the care of this patient with my supervising physician.                      Clinical Impression:   Final diagnoses:  [Z71.89] Encounter for ostomy care education (Primary)  [K94.02, L03.311] Cellulitis of colostomy      ED Disposition Condition    Discharge Stable          ED Prescriptions       Medication Sig Dispense Start Date End Date Auth. Provider    cephALEXin (KEFLEX) 500 MG capsule Take 1 capsule (500 mg total) by mouth 4 (four) times daily. for 5 days 20 capsule 10/15/2022 10/20/2022 Hammad Roa PA-C          Follow-up Information       Follow up With Specialties Details Why Contact Info Additional Information    [facility] Colon [facility] Rectal [facility]   [address]  [facility] [id number] [phone] [facility] Please park in [facility] and use [facility] elevator             Hammad Roa PA-C  10/15/22 [id number]

## 2022-10-15 NOTE — ED NOTES
[facility] identifiers [facility] 57 [facility] checked and correct.  Chief Complaint   Patient presents with    Refill Coordination     Need more ostomy bags     Past Medical History:   Diagnosis Date    Abnormal mammogram 08/25/2020    Acute blood loss anemia     Acute deep vein thrombosis (DVT) of lower extremity 12/09/2020    Advance care planning 04/30/2021    Anemia due to chronic blood loss     Anemia due to chronic kidney disease     Anxiety     Cardiovascular event risk -- low 09/14/2015    ASCVD 10-year risk 1.9% (with optimal risk factors 1.3%) as of 9/14/2015     Cervical cancer 2014    Chronic back pain     Colostomy care     Deep vein thrombosis     Depression     Diarrhea due to malabsorption 11/14/2018    Diarrhea due to malabsorption 11/14/2018    Difficult intubation     Disorder of kidney and ureter     DVT of lower extremity, bilateral 11/04/2020    Fibromyalgia     Fungemia 09/27/2020    Generalized abdominal pain 08/25/2020    GERD (gastroesophageal reflux disease)     Hemifacial spasm 09/16/2015    Hiatal hernia 2014    History of cervical cancer 10/11/2018    Hx of psychiatric care     Cymbalta, trazodone    Hypertension     Hypomagnesemia 11/21/2018    Lactose intolerance     Metastatic squamous cell carcinoma to lymph node 10/11/2018    Nephrostomy tube displaced     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 Bx were negative.     Seizures     Urinary tract infection associated with nephrostomy catheter 06/11/2020    Wound infection after surgery 09/24/2020     Allergies reported:   Review of patient's allergies indicates:   Allergen Reactions    Bee sting [allergen ext-venom-honey bee]      Rash      Grass pollen-bermuda, standard      rash          LOC: Patient is awake, alert, and aware of environment with an appropriate affect. Patient is oriented x 4 and speaking appropriately.  APPEARANCE: Patient resting comfortably and in no acute distress. Patient is clean and well groomed, patient's clothing is properly fastened.  HEENT: Pt presents with surgical mask on.   SKIN: The skin is warm and dry. Patient has normal skin turgor and moist mucus membranes.   MUSKULOSKELETAL: Patient is moving all extremities well, no obvious deformities noted. Pulses intact.   RESPIRATORY: Airway is open and patent. Respirations are spontaneous and non-labored with normal effort and rate.  CARDIAC: Patient has a normal rate and rhythm. 93 on cardiac monitor. No peripheral edema noted.   ABDOMEN: No distention noted. Soft and non-tender upon palpation. Pt reports abdominal pain and discomfort r/t to both stomas. Redness noted at abdomen and stoma leaking   NEUROLOGICAL: Facial expression is symmetrical. Hand grasps are equal bilaterally. Normal sensation in all extremities when touched with finger.

## 2022-10-15 NOTE — PLAN OF CARE
SW consulted to assist with colostomy bag supplies. Pt states she ran out of supplies about a month ago. Pt also has urostomy and bilateral nephrostomy tubes. Concern for infection d/t feces from ostomy on pt's body and clothing. Pt has  who helps her manage. Provider will place HH orders (Medicaid may be barrier to acceptance). Pt has good outpatient f/u and went to her last urology appt on 10/3.     SW met with pt at bedside in ED. Pt states that she has a monthly supply of 12 bags delivered from [facility] [employer]. Pt states that she is needing to change her bag almost daily and sometimes twice per day d/t leaks and the adhesive not sticking. Pt states that realistically she needs closer to 35-40 bags. Pt reports that she called [employer] to ask for more bags monthly but they informed her that her insurance would not cover more. Pt does not have financial means to purchase bags regularly. Pt states her next shipment of 12 bags comes on 10/27. Pt also would like a HH nurse to come to her home and help with her care. SW informed pt that HH referrals will be sent and that SW will f/u with her medical supply company on Monday to determine if she can get more bags delivered. No other needs expressed at this time.     Provider updated. HH orders placed. [facility] Pt not eligible for outpatient case management referral d/t medicaid.     Christina Cardenas, [facility]  ED   [facility]  Ext. [phone]

## 2022-10-15 NOTE — DISCHARGE INSTRUCTIONS
Take the prescribed Keflex for treatment of possible skin infection around your stoma.    Follow-up with your colon rectal team for further management.    Return to the emergency room for new, worsening, or concerning symptoms.

## 2022-10-16 ENCOUNTER — HOSPITAL ENCOUNTER (EMERGENCY)
Facility: HOSPITAL | Age: 57
Discharge: HOME | End: 2022-10-16
Attending: EMERGENCY MEDICINE
Payer: MEDICAID

## 2022-10-16 VITALS
DIASTOLIC BLOOD PRESSURE: 91 MMHG | WEIGHT: 189 LBS | SYSTOLIC BLOOD PRESSURE: 128 MMHG | RESPIRATION RATE: 16 BRPM | OXYGEN SATURATION: 100 % | BODY MASS INDEX: 27.91 KG/M2 | HEART RATE: 85 BPM | TEMPERATURE: 98 F

## 2022-10-16 DIAGNOSIS — Z71.89 ENCOUNTER FOR OSTOMY CARE EDUCATION: Primary | ICD-10-CM

## 2022-10-16 PROCEDURE — 99281 EMR DPT VST MAYX REQ PHY/QHP: CPT | Mod: ,,, | Performed by: EMERGENCY MEDICINE

## 2022-10-16 PROCEDURE — 99281 PR EMERGENCY DEPT VISIT,LEVEL I: ICD-10-PCS | Mod: ,,, | Performed by: EMERGENCY MEDICINE

## 2022-10-16 PROCEDURE — 99283 EMERGENCY DEPT VISIT LOW MDM: CPT

## 2022-10-17 NOTE — ED PROVIDER NOTES
Encounter Date: 10/16/2022       History     Chief Complaint   Patient presents with    Abdominal Pain     Has colostomy bag and biliary drain, pt has paper towels over wound, actively draining. States she needs to have surgery in 2 weeks for her kidneys      HPI    57yF with h/o cervical ca s/p pelvic radiation, urinary diversion, now with iliostomy and urostomy, BL nephrostomy tubes here with leaking right lower quadrant ostomy.  It is not the bag that we placed on her yesterday, when she was seen here for a similar concern, it is a new bag that she placed.  She reports that the skin is burning and painful.  She has applied paper towels to the area.  No other complaints.  Review of patient's allergies indicates:   Allergen Reactions    Bee sting [allergen ext-venom-honey bee]      Rash      Grass pollen-bermuda, standard      rash     Past Medical History:   Diagnosis Date    Abnormal mammogram 08/25/2020    Acute blood loss anemia     Acute deep vein thrombosis (DVT) of lower extremity 12/09/2020    Advance care planning 04/30/2021    Anemia due to chronic blood loss     Anemia due to chronic kidney disease     Anxiety     Cardiovascular event risk -- low 09/14/2015    ASCVD 10-year risk 1.9% (with optimal risk factors 1.3%) as of 9/14/2015     Cervical cancer 2014    Chronic back pain     Colostomy care     Deep vein thrombosis     Depression     Diarrhea due to malabsorption 11/14/2018    Diarrhea due to malabsorption 11/14/2018    Difficult intubation     Disorder of kidney and ureter     DVT of lower extremity, bilateral 11/04/2020    Fibromyalgia     Fungemia 09/27/2020    Generalized abdominal pain 08/25/2020    GERD (gastroesophageal reflux disease)     Hemifacial spasm 09/16/2015    Hiatal hernia 2014    History of cervical cancer 10/11/2018    Hx of psychiatric care     Cymbalta, trazodone    Hypertension     Hypomagnesemia 11/21/2018    Lactose intolerance     Metastatic squamous cell carcinoma to lymph  node 10/11/2018    Nephrostomy tube displaced     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 Bx were negative.     Seizures     Urinary tract infection associated with nephrostomy catheter 06/11/2020    Wound infection after surgery 09/24/2020     Past Surgical History:   Procedure Laterality Date    ANTEGRADE NEPHROSTOGRAPHY Bilateral 9/28/2020    Procedure: Nephrostogram - antegrade;  Surgeon: Leslie Balbuena MD;  Location: [facility] OR [address] [place] [address];  Service: Urology;  Laterality: Bilateral;    ANTEGRADE NEPHROSTOGRAPHY Left 4/20/2021    Procedure: NEPHROSTOGRAM, ANTEGRADE;  Surgeon: Leslie Balbuena MD;  Location: [facility] OR [address] [place] [address];  Service: Urology;  Laterality: Left;    BILATERAL OOPHORECTOMY  2015    CHOLECYSTECTOMY  11/09/2016    Done at [facility], path showed chronic cholecystitis and gallstones    cold knife conization  2014    COLECTOMY, RIGHT  9/17/2020    Procedure: COLECTOMY, RIGHT Extended;  Surgeon: Hammad Reynolds MD;  Location: [facility] OR 2ND FLR;  Service: Colon and Rectal;;    COLONOSCOPY  2014    COLONOSCOPY N/A 10/24/2016    at [facility], Dr Gutiérrez    COLONOSCOPY N/A 5/18/2018    Procedure: COLONOSCOPY;  Surgeon: Arden Gutiérrez MD;  Location: [facility] (4TH FLR);  Service: Endoscopy;  Laterality: N/A;    COLONOSCOPY N/A 7/28/2020    Procedure: COLONOSCOPY;  Surgeon: Hammad Reynolds MD;  Location: [facility] (4TH FLR);  Service: Colon and Rectal;  Laterality: N/A;  [place] 7[place]    CYSTOSCOPY WITH URETEROSCOPY, RETROGRADE PYELOGRAPHY, AND INSERTION OF STENT Bilateral 3/21/2020    Procedure: CYSTOSCOPY, WITH RETROGRADE PYELOGRAM,;  Surgeon: Leslie Balbuena MD;  Location: [facility] OR 1ST FLR;  Service: Urology;  Laterality: Bilateral;    ESOPHAGOGASTRODUODENOSCOPY  2014     ESOPHAGOGASTRODUODENOSCOPY  11/18/2020    ESOPHAGOGASTRODUODENOSCOPY N/A 11/18/2020    Procedure: ESOPHAGOGASTRODUODENOSCOPY (EGD);  Surgeon: Zenon Spencer MD;  Location: [facility];  Service: Endoscopy;  Laterality: N/A;    ESOPHAGOGASTRODUODENOSCOPY N/A 12/11/2020    Procedure: EGD (ESOPHAGOGASTRODUODENOSCOPY);  Surgeon: Juancho Muse MD;  Location: [facility] ENDO (2ND FLR);  Service: Endoscopy;  Laterality: N/A;    HYSTERECTOMY  2014    [facility] for cervical cancer    ILEOSTOMY  9/17/2020    Procedure: CREATION, ILEOSTOMY;  Surgeon: Hammad Reynolds MD;  Location: [facility] OR 2ND FLR;  Service: Colon and Rectal;;    LOOPOGRAM N/A 4/20/2021    Procedure: LOOPOGRAM;  Surgeon: Leslie Balbuena MD;  Location: [facility] OR 1ST FLR;  Service: Urology;  Laterality: N/A;    MOBILIZATION OF SPLENIC FLEXURE N/A 9/11/2020    Procedure: MOBILIZATION, COLONIC;  Surgeon: Hammad Reynolds MD;  Location: [facility] OR 2ND FLR;  Service: Colon and Rectal;  Laterality: N/A;    NEPHROSTOGRAPHY Bilateral 4/17/2021    Procedure: Nephrostogram;  Surgeon: Celeste Surgeon;  Location: [facility];  Service: Anesthesiology;  Laterality: Bilateral;    OOPHORECTOMY      RETROPERITONEAL LYMPHADENECTOMY Right 9/17/2018    Procedure: LYMPHADENECTOMY, RETROPERITONEUM;  Surgeon: Sebas Reed MD;  [facility];  Service: General;  Laterality: Right;  ILIAC     Family History   Problem Relation Age of Onset    Heart disease Sister     Heart disease Maternal Grandmother     Colon cancer Father     Esophageal cancer Father     Cancer Father         Lung-smoker    Cancer Mother         Cervical    Cervical cancer Mother     Breast cancer Maternal Aunt     Suicide Daughter         jumped from parking structure    Drug abuse Daughter     Drug abuse Daughter     Rectal cancer Neg Hx     Stomach cancer Neg Hx     Crohn's disease Neg Hx     Ulcerative colitis Neg Hx     Diabetes Neg Hx     Hypertension Neg Hx      Social History     Tobacco Use    Smoking  status: Former    Smokeless tobacco: Never   Substance Use Topics    Alcohol use: No     Alcohol/week: 0.0 standard drinks    Drug use: No     Review of Systems   Constitutional:  Negative for chills, diaphoresis, fatigue and fever.   HENT:  Negative for congestion, rhinorrhea and sore throat.    Eyes:  Negative for visual disturbance.   Respiratory:  Negative for cough, chest tightness and shortness of breath.    Cardiovascular:  Negative for chest pain.   Gastrointestinal:  Negative for abdominal pain, blood in stool, constipation, diarrhea and vomiting.   Genitourinary:  Negative for dysuria, hematuria and urgency.   Musculoskeletal:  Negative for back pain.   Skin:  Positive for wound. Negative for rash.   Neurological:  Negative for seizures and syncope.   Hematological:  Does not bruise/bleed easily.   Psychiatric/Behavioral:  Negative for agitation and hallucinations.      Physical Exam     Initial Vitals [10/16/22 1851]   BP Pulse Resp Temp SpO2   130/77 88 16 97.9 °F (36.6 °C) 100 %      MAP       --         Physical Exam  Gen: AxOx4, NAD, appears stated age, well appearing  Eye: EOMI, no scleral icterus, no periorbital edema or ecchymosis  Head: Normocephalic, atraumatic, no lesions, scalp grossly normal  ENT: neck supple, no stridor, no masses, no abnormal phonation or drooling  CVS: warm and well perfused, cap refill <2 seconds, no extremity pallor  PULM: normal work of breathing, no stridor, equal chest rise, no peripheral cyanosis  ABD:  Soft, bilateral percutaneous nephrostomy tubes in place.  The right lower quadrant ostomy site is erythematous, irritated appearing, with a poorly adhered ostomy bag, such that there is stool leaking over her abdominal wall.  The urostomy site is clean, dry, intact.  Ext: no rash, no deformities, moving all joints with normal ROM  Neuro: LEBRON, gait intact, face grossly symmetric  Psych: well groomed, mood and affect congruent, makes good eye contact, cooperative    ED  Course   Procedures  Labs Reviewed - No data to display       Imaging Results    None          Medications - No data to display  Medical Decision Making:   History:   Old Medical Records: I decided to obtain old medical records.  Old Records Summarized: records from clinic visits.  Initial Assessment:   This is a 57-year-old woman with a history of ostomy, here with skin irritation in the setting of leaking ostomy bag.  It is not clear why she continues to have issues with this, and attempts at arranging home health have been unsuccessful due to her insurance status.  Will leave a message for social work to follow-up, but in the meantime we will get her cleaned up and replaced her bag.  She was prescribed Keflex yesterday, I do not think additional antibiotics are needed.  I think good skin protective strategies are the most important.  ED Management:  After pt cleaned up and new ostomy attached, she is feeling a lot better. Skin intact. I do not think new workup is indicated. She was educated on proper technique and discharged in good condition.                         Clinical Impression:   Final diagnoses:  [Z71.89] Encounter for ostomy care education (Primary)      ED Disposition Condition    Discharge Stable          ED Prescriptions    None       Follow-up Information       Follow up With Specialties Details Why Contact Info    [facility] Emergency Dept Emergency Medicine  If symptoms worsen [address]  [facility] [id number] [phone]             Betty Langston MD  10/16/22 [id number]

## 2022-10-17 NOTE — ED NOTES
Bed: [facility]  Expected date:   Expected time:   Means of arrival:   Comments:  Rosemary

## 2022-10-17 NOTE — ED TRIAGE NOTES
Edita Riley, a 57 y.o. female presents to the ED w/ complaint of abdominal pain and yellow drainage out of her stoma. Pt has ileostomy, urostomy, and nephrostomy tubes.     Triage note:  Chief Complaint   Patient presents with    Abdominal Pain     Has colostomy bag and biliary drain, pt has paper towels over wound, actively draining. States she needs to have surgery in 2 weeks for her kidneys      Review of patient's allergies indicates:   Allergen Reactions    Bee sting [allergen ext-venom-honey bee]      Rash      Grass pollen-bermuda, standard      rash     Past Medical History:   Diagnosis Date    Abnormal mammogram 08/25/2020    Acute blood loss anemia     Acute deep vein thrombosis (DVT) of lower extremity 12/09/2020    Advance care planning 04/30/2021    Anemia due to chronic blood loss     Anemia due to chronic kidney disease     Anxiety     Cardiovascular event risk -- low 09/14/2015    ASCVD 10-year risk 1.9% (with optimal risk factors 1.3%) as of 9/14/2015     Cervical cancer 2014    Chronic back pain     Colostomy care     Deep vein thrombosis     Depression     Diarrhea due to malabsorption 11/14/2018    Diarrhea due to malabsorption 11/14/2018    Difficult intubation     Disorder of kidney and ureter     DVT of lower extremity, bilateral 11/04/2020    Fibromyalgia     Fungemia 09/27/2020    Generalized abdominal pain 08/25/2020    GERD (gastroesophageal reflux disease)     Hemifacial spasm 09/16/2015    Hiatal hernia 2014    History of cervical cancer 10/11/2018    Hx of psychiatric care     Cymbalta, trazodone    Hypertension     Hypomagnesemia 11/21/2018    Lactose intolerance     Metastatic squamous cell carcinoma to lymph node 10/11/2018    Nephrostomy tube displaced     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 drug 11/14/2018    Osteoarthritis of back     Peritonitis 09/22/2020    Pseudomonas urinary tract infection 04/21/2021    Psychiatric problem     Schatzki's ring 09/14/2015    Seen  on outside EGD 05/2014, underwent esophageal dilatation. Bx were negative.     Seizures     Urinary tract infection associated with nephrostomy catheter 06/11/2020    Wound infection after surgery 09/24/2020

## 2022-10-19 DIAGNOSIS — Z93.2 ILEOSTOMY IN PLACE: Chronic | ICD-10-CM

## 2022-10-19 DIAGNOSIS — F41.1 GENERALIZED ANXIETY DISORDER: Primary | Chronic | ICD-10-CM

## 2022-10-19 DIAGNOSIS — N13.5 BILATERAL URETERAL OBSTRUCTION: Chronic | ICD-10-CM

## 2022-10-21 ENCOUNTER — TELEPHONE (OUTPATIENT)
Dept: CARDIOLOGY | Facility: CLINIC | Age: 57
End: 2022-10-21
Payer: MEDICAID

## 2022-10-21 NOTE — TELEPHONE ENCOUNTER
----- Message from Sherlyn Valle DNP sent at 10/21/2022 12:16 PM CDT -----  Stress test normal- we will discuss more at her NOV.  Thank you!

## 2022-10-21 NOTE — TELEPHONE ENCOUNTER
Informed patient stress test is normal, scheduled follow up with SARAH Valle on 11/21/22 at 10:30 AM.

## 2022-10-23 ENCOUNTER — HOSPITAL ENCOUNTER (EMERGENCY)
Facility: HOSPITAL | Age: 57
Discharge: HOME | End: 2022-10-23
Attending: EMERGENCY MEDICINE
Payer: MEDICAID

## 2022-10-23 VITALS
TEMPERATURE: 98 F | OXYGEN SATURATION: 99 % | SYSTOLIC BLOOD PRESSURE: 96 MMHG | BODY MASS INDEX: 27.91 KG/M2 | RESPIRATION RATE: 18 BRPM | WEIGHT: 189 LBS | DIASTOLIC BLOOD PRESSURE: 53 MMHG | HEART RATE: 68 BPM

## 2022-10-23 DIAGNOSIS — R19.7 DIARRHEA, UNSPECIFIED TYPE: ICD-10-CM

## 2022-10-23 DIAGNOSIS — N20.1 RIGHT URETERAL STONE: Primary | ICD-10-CM

## 2022-10-23 DIAGNOSIS — K94.19 ALTERED BOWEL ELIMINATION DUE TO INTESTINAL OSTOMY: Primary | ICD-10-CM

## 2022-10-23 LAB
ALBUMIN SERPL BCP-MCNC: 3.3 G/DL (ref 3.5–5.2)
ALP SERPL-CCNC: 130 U/L (ref 55–135)
ALT SERPL W/O P-5'-P-CCNC: 11 U/L (ref 10–44)
ANION GAP SERPL CALC-SCNC: 8 MMOL/L (ref 8–16)
AST SERPL-CCNC: 21 U/L (ref 10–40)
BACTERIA #/AREA URNS AUTO: ABNORMAL /HPF
BASOPHILS # BLD AUTO: 0.05 K/UL (ref 0–0.2)
BASOPHILS NFR BLD: 0.5 % (ref 0–1.9)
BILIRUB SERPL-MCNC: 0.2 MG/DL (ref 0.1–1)
BILIRUB UR QL STRIP: NEGATIVE
BUN SERPL-MCNC: 17 MG/DL (ref 6–20)
CALCIUM SERPL-MCNC: 9.4 MG/DL (ref 8.7–10.5)
CHLORIDE SERPL-SCNC: 115 MMOL/L (ref 95–110)
CLARITY UR REFRACT.AUTO: ABNORMAL
CO2 SERPL-SCNC: 17 MMOL/L (ref 23–29)
COLOR UR AUTO: ABNORMAL
CREAT SERPL-MCNC: 1.5 MG/DL (ref 0.5–1.4)
DIFFERENTIAL METHOD: ABNORMAL
EOSINOPHIL # BLD AUTO: 0.4 K/UL (ref 0–0.5)
EOSINOPHIL NFR BLD: 4.2 % (ref 0–8)
ERYTHROCYTE [DISTWIDTH] IN BLOOD BY AUTOMATED COUNT: 14.6 % (ref 11.5–14.5)
EST. GFR  (NO RACE VARIABLE): 39.9 ML/MIN/1.73 M^2
GLUCOSE SERPL-MCNC: 86 MG/DL (ref 70–110)
GLUCOSE UR QL STRIP: NEGATIVE
HCT VFR BLD AUTO: 40.9 % (ref 37–48.5)
HCV AB SERPL QL IA: NORMAL
HGB BLD-MCNC: 11.9 G/DL (ref 12–16)
HGB UR QL STRIP: ABNORMAL
HIV 1+2 AB+HIV1 P24 AG SERPL QL IA: NORMAL
HYALINE CASTS UR QL AUTO: 0 /LPF
IMM GRANULOCYTES # BLD AUTO: 0.03 K/UL (ref 0–0.04)
IMM GRANULOCYTES NFR BLD AUTO: 0.3 % (ref 0–0.5)
KETONES UR QL STRIP: NEGATIVE
LACTATE SERPL-SCNC: 1.5 MMOL/L (ref 0.5–2.2)
LEUKOCYTE ESTERASE UR QL STRIP: ABNORMAL
LYMPHOCYTES # BLD AUTO: 1.7 K/UL (ref 1–4.8)
LYMPHOCYTES NFR BLD: 18.3 % (ref 18–48)
MCH RBC QN AUTO: 27.4 PG (ref 27–31)
MCHC RBC AUTO-ENTMCNC: 29.1 G/DL (ref 32–36)
MCV RBC AUTO: 94 FL (ref 82–98)
MICROSCOPIC COMMENT: ABNORMAL
MONOCYTES # BLD AUTO: 1 K/UL (ref 0.3–1)
MONOCYTES NFR BLD: 11.1 % (ref 4–15)
NEUTROPHILS # BLD AUTO: 6.1 K/UL (ref 1.8–7.7)
NEUTROPHILS NFR BLD: 65.6 % (ref 38–73)
NITRITE UR QL STRIP: NEGATIVE
NON-SQ EPI CELLS #/AREA URNS AUTO: 3 /HPF
NRBC BLD-RTO: 0 /100 WBC
PH UR STRIP: 7 [PH] (ref 5–8)
PLATELET # BLD AUTO: 247 K/UL (ref 150–450)
PMV BLD AUTO: 11.3 FL (ref 9.2–12.9)
POTASSIUM SERPL-SCNC: 4.1 MMOL/L (ref 3.5–5.1)
PROT SERPL-MCNC: 7.6 G/DL (ref 6–8.4)
PROT UR QL STRIP: ABNORMAL
RBC # BLD AUTO: 4.34 M/UL (ref 4–5.4)
RBC #/AREA URNS AUTO: 78 /HPF (ref 0–4)
SODIUM SERPL-SCNC: 140 MMOL/L (ref 136–145)
SP GR UR STRIP: 1.02 (ref 1–1.03)
URN SPEC COLLECT METH UR: ABNORMAL
WBC # BLD AUTO: 9.25 K/UL (ref 3.9–12.7)
WBC #/AREA URNS AUTO: >100 /HPF (ref 0–5)
WBC CLUMPS UR QL AUTO: ABNORMAL
YEAST UR QL AUTO: ABNORMAL

## 2022-10-23 PROCEDURE — 87186 SC STD MICRODIL/AGAR DIL: CPT | Performed by: PHYSICIAN ASSISTANT

## 2022-10-23 PROCEDURE — 99285 EMERGENCY DEPT VISIT HI MDM: CPT | Mod: ,,, | Performed by: EMERGENCY MEDICINE

## 2022-10-23 PROCEDURE — 96374 THER/PROPH/DIAG INJ IV PUSH: CPT

## 2022-10-23 PROCEDURE — 87389 HIV-1 AG W/HIV-1&-2 AB AG IA: CPT | Performed by: PHYSICIAN ASSISTANT

## 2022-10-23 PROCEDURE — 87088 URINE BACTERIA CULTURE: CPT | Performed by: PHYSICIAN ASSISTANT

## 2022-10-23 PROCEDURE — 85025 COMPLETE CBC W/AUTO DIFF WBC: CPT | Performed by: PHYSICIAN ASSISTANT

## 2022-10-23 PROCEDURE — 25000003 PHARM REV CODE 250: Performed by: PHYSICIAN ASSISTANT

## 2022-10-23 PROCEDURE — 86803 HEPATITIS C AB TEST: CPT | Performed by: PHYSICIAN ASSISTANT

## 2022-10-23 PROCEDURE — 99285 PR EMERGENCY DEPT VISIT,LEVEL V: ICD-10-PCS | Mod: ,,, | Performed by: EMERGENCY MEDICINE

## 2022-10-23 PROCEDURE — 96376 TX/PRO/DX INJ SAME DRUG ADON: CPT | Mod: 59

## 2022-10-23 PROCEDURE — 63600175 PHARM REV CODE 636 W HCPCS: Performed by: PHYSICIAN ASSISTANT

## 2022-10-23 PROCEDURE — 96375 TX/PRO/DX INJ NEW DRUG ADDON: CPT | Mod: 59

## 2022-10-23 PROCEDURE — 87077 CULTURE AEROBIC IDENTIFY: CPT | Performed by: PHYSICIAN ASSISTANT

## 2022-10-23 PROCEDURE — 83605 ASSAY OF LACTIC ACID: CPT | Performed by: PHYSICIAN ASSISTANT

## 2022-10-23 PROCEDURE — 96361 HYDRATE IV INFUSION ADD-ON: CPT | Mod: 59

## 2022-10-23 PROCEDURE — 25500020 PHARM REV CODE 255: Performed by: EMERGENCY MEDICINE

## 2022-10-23 PROCEDURE — 80053 COMPREHEN METABOLIC PANEL: CPT | Performed by: PHYSICIAN ASSISTANT

## 2022-10-23 PROCEDURE — 81001 URINALYSIS AUTO W/SCOPE: CPT | Performed by: PHYSICIAN ASSISTANT

## 2022-10-23 PROCEDURE — 99285 EMERGENCY DEPT VISIT HI MDM: CPT | Mod: 25

## 2022-10-23 PROCEDURE — 87086 URINE CULTURE/COLONY COUNT: CPT | Performed by: PHYSICIAN ASSISTANT

## 2022-10-23 RX ORDER — MORPHINE SULFATE 4 MG/ML
4 INJECTION, SOLUTION INTRAMUSCULAR; INTRAVENOUS
Status: COMPLETED | OUTPATIENT
Start: 2022-10-23 | End: 2022-10-23

## 2022-10-23 RX ORDER — AMOXICILLIN AND CLAVULANATE POTASSIUM 875; 125 MG/1; MG/1
1 TABLET, FILM COATED ORAL EVERY 12 HOURS
Qty: 10 TABLET | Refills: 0 | Status: SHIPPED | OUTPATIENT
Start: 2022-10-23 | End: 2022-10-28

## 2022-10-23 RX ORDER — ONDANSETRON 2 MG/ML
4 INJECTION INTRAMUSCULAR; INTRAVENOUS
Status: COMPLETED | OUTPATIENT
Start: 2022-10-23 | End: 2022-10-23

## 2022-10-23 RX ADMIN — MORPHINE SULFATE 4 MG: 4 INJECTION INTRAVENOUS at 02:10

## 2022-10-23 RX ADMIN — ONDANSETRON 4 MG: 2 INJECTION INTRAMUSCULAR; INTRAVENOUS at 09:10

## 2022-10-23 RX ADMIN — MORPHINE SULFATE 4 MG: 4 INJECTION INTRAVENOUS at 09:10

## 2022-10-23 RX ADMIN — SODIUM CHLORIDE 1000 ML: 0.9 INJECTION, SOLUTION INTRAVENOUS at 09:10

## 2022-10-23 RX ADMIN — IOHEXOL 75 ML: 350 INJECTION, SOLUTION INTRAVENOUS at 10:10

## 2022-10-23 NOTE — ED TRIAGE NOTES
Patient reports pain and burning around her stoma, pt has had stoma approx 4 years. Patient states she has been having loose stools and going through bags more frequently. Patient states she ran out of bags. Pt denies nausea or vomiting. Pt denies any other pain or symptoms.

## 2022-10-23 NOTE — ED PROVIDER NOTES
Encounter Date: 10/23/2022       History     Chief Complaint   Patient presents with    Abdominal Pain     Reports abdominal pain and loose stools coming from colostomy that started yesterday. Reports ran out of bags.     This is a 57 year old female with a PMH of HTN, cervical cancer s/p surgery and pelvic radiation, open transverse colon conduit urinary diversion on 9/11/2020 complicated by bowel perforation s/p take back for hemicolectomy and ileostomy creation (9/17/20) presenting to the ED with a chief complaint of diarrhea. She states since last night she has increased ostomy output. She was up all night fooling with it, states the bag is filling up every couple of hours. The output is watery. Notes bleeding at the stoma site and difficulty adhering colostomy bag to the skin. She's running out of bags and requests colostomy supplies. She has diffuse abdominal discomfort. She denies URI symptoms, active chest pain, SOB, black or bloody stool, changes in urine output.           Review of patient's allergies indicates:   Allergen Reactions    Bee sting [allergen ext-venom-honey bee]      Rash      Grass pollen-bermuda, standard      rash     Past Medical History:   Diagnosis Date    Abnormal mammogram 08/25/2020    Acute blood loss anemia     Acute deep vein thrombosis (DVT) of lower extremity 12/09/2020    Advance care planning 04/30/2021    Anemia due to chronic blood loss     Anemia due to chronic kidney disease     Anxiety     Cardiovascular event risk -- low 09/14/2015    ASCVD 10-year risk 1.9% (with optimal risk factors 1.3%) as of 9/14/2015     Cervical cancer 2014    Chronic back pain     Colostomy care     Deep vein thrombosis     Depression     Diarrhea due to malabsorption 11/14/2018    Diarrhea due to malabsorption 11/14/2018    Difficult intubation     Disorder of kidney and ureter     DVT of lower extremity, bilateral 11/04/2020    Fibromyalgia     Fungemia 09/27/2020    Generalized abdominal pain  08/25/2020    GERD (gastroesophageal reflux disease)     Hemifacial spasm 09/16/2015    Hiatal hernia 2014    History of cervical cancer 10/11/2018    Hx of psychiatric care     Cymbalta, trazodone    Hypertension     Hypomagnesemia 11/21/2018    Lactose intolerance     Metastatic squamous cell carcinoma to lymph node 10/11/2018    Nephrostomy tube displaced     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 Bx were negative.     Seizures     Urinary tract infection associated with nephrostomy catheter 06/11/2020    Wound infection after surgery 09/24/2020     Past Surgical History:   Procedure Laterality Date    ANTEGRADE NEPHROSTOGRAPHY Bilateral 9/28/2020    Procedure: Nephrostogram - antegrade;  Surgeon: Leslie Balbuena MD;  Location: [facility] OR [address] [place] [address];  Service: Urology;  Laterality: Bilateral;    ANTEGRADE NEPHROSTOGRAPHY Left 4/20/2021    Procedure: NEPHROSTOGRAM, ANTEGRADE;  Surgeon: Leslie Balbuena MD;  Location: [facility] OR [address] [place] [address];  Service: Urology;  Laterality: Left;    BILATERAL OOPHORECTOMY  2015    CHOLECYSTECTOMY  11/09/2016    Done at [facility], path showed chronic cholecystitis and gallstones    cold knife conization  2014    COLECTOMY, RIGHT  9/17/2020    Procedure: COLECTOMY, RIGHT Extended;  Surgeon: Hammad Reynolds MD;  Location: [facility] OR 2ND FLR;  Service: Colon and Rectal;;    COLONOSCOPY  2014    COLONOSCOPY N/A 10/24/2016    at [facility], Dr Thomas    COLONOSCOPY N/A 5/18/2018    Procedure: COLONOSCOPY;  Surgeon: Arden Gutiérrez MD;  Location: [facility] ENDO (4TH FLR);  Service: Endoscopy;  Laterality: N/A;    COLONOSCOPY N/A 7/28/2020    Procedure: COLONOSCOPY;  Surgeon: Hammad Reynolds MD;  Location: [facility] ENDO (4TH FLR);  Service: Colon and Rectal;  Laterality: N/A;  [facility]    CYSTOSCOPY WITH URETEROSCOPY, RETROGRADE PYELOGRAPHY, AND INSERTION OF STENT Bilateral 3/21/2020    Procedure: CYSTOSCOPY, WITH RETROGRADE PYELOGRAM,;  Surgeon: Leslie Balbuena MD;  Location: [facility] OR 1ST FLR;  Service: Urology;  Laterality: Bilateral;    ESOPHAGOGASTRODUODENOSCOPY  2014    ESOPHAGOGASTRODUODENOSCOPY  11/18/2020    ESOPHAGOGASTRODUODENOSCOPY N/A 11/18/2020    Procedure: ESOPHAGOGASTRODUODENOSCOPY (EGD);  Surgeon: Zenon Spencer MD;  Location: [facility] ENDO;  Service: Endoscopy;  Laterality: N/A;    ESOPHAGOGASTRODUODENOSCOPY N/A 12/11/2020    Procedure: EGD (ESOPHAGOGASTRODUODENOSCOPY);  Surgeon: Juancho Muse MD;  Location: [facility] (2ND FLR);  Service: Endoscopy;  Laterality: N/A;    HYSTERECTOMY  2014    [facility] for cervical cancer    ILEOSTOMY  9/17/2020    Procedure: CREATION, ILEOSTOMY;  Surgeon: Hammad Reynolds MD;  Location: [facility] OR 2ND FLR;  Service: Colon and Rectal;;    LOOPOGRAM N/A 4/20/2021    Procedure: LOOPOGRAM;  Surgeon: Leslie Balbuena MD;  Location: [facility] OR 1ST FLR;  Service: Urology;  Laterality: N/A;    MOBILIZATION OF SPLENIC FLEXURE N/A 9/11/2020    Procedure: MOBILIZATION, COLONIC;  Surgeon: Hammad Reynolds MD;  Location: [facility] OR 2ND FLR;  Service: Colon and Rectal;  Laterality: N/A;    NEPHROSTOGRAPHY Bilateral 4/17/2021    Procedure: Nephrostogram;  Surgeon: Celeste Surgeon;  [facility];  Service: Anesthesiology;  Laterality: Bilateral;    OOPHORECTOMY      RETROPERITONEAL LYMPHADENECTOMY Right 9/17/2018    Procedure: LYMPHADENECTOMY, RETROPERITONEUM;  Surgeon: Sebas Reed MD;  Location: [facility] OR 2ND FLR;  Service: General;  Laterality: Right;  ILIAC     Family History   Problem Relation Age of Onset    Heart disease Sister     Heart disease Maternal Grandmother     Colon cancer Father     Esophageal cancer Father     Cancer Father         Lung-smoker    Cancer Mother         Cervical    Cervical cancer Mother     Breast cancer Maternal  Aunt     Suicide Daughter         jumped from parking structure    Drug abuse Daughter     Drug abuse Daughter     Rectal cancer Neg Hx     Stomach cancer Neg Hx     Crohn's disease Neg Hx     Ulcerative colitis Neg Hx     Diabetes Neg Hx     Hypertension Neg Hx      Social History     Tobacco Use    Smoking status: Former    Smokeless tobacco: Never   Substance Use Topics    Alcohol use: No     Alcohol/week: 0.0 standard drinks    Drug use: No     Review of Systems   Constitutional:  Positive for appetite change and fatigue. Negative for chills and fever.   HENT:  Negative for sore throat.    Respiratory:  Negative for shortness of breath.    Cardiovascular:  Negative for chest pain.   Gastrointestinal:  Positive for abdominal pain and diarrhea. Negative for nausea and vomiting.   Genitourinary:  Negative for dysuria.   Musculoskeletal:  Negative for back pain.   Skin:  Negative for rash.   Neurological:  Negative for weakness.   Hematological:  Does not bruise/bleed easily.     Physical Exam     Initial Vitals [10/23/22 0840]   BP Pulse Resp Temp SpO2   117/65 89 16 98.1 °F (36.7 °C) 98 %      MAP       --         Physical Exam    Constitutional: She appears well-developed and well-nourished.   HENT:   Head: Atraumatic.   Eyes: Conjunctivae and EOM are normal. Pupils are equal, round, and reactive to light.   Cardiovascular:  Normal rate, regular rhythm and normal heart sounds.           Pulmonary/Chest: Breath sounds normal. No respiratory distress. She has no wheezes. She has no rhonchi. She has no rales.   Abdominal: Abdomen is soft. Bowel sounds are normal. There is generalized abdominal tenderness.   Bilateral nephrostomy tubes.         Neurological: She is alert and oriented to person, place, and time.   Skin: Skin is warm and dry. No rash noted.       ED Course   Procedures  Labs Reviewed   CBC W/ AUTO DIFFERENTIAL - Abnormal; Notable for the following components:       Result Value    Hemoglobin 11.9 (*)      MCHC 29.1 (*)     RDW 14.6 (*)     All other components within normal limits   COMPREHENSIVE METABOLIC PANEL - Abnormal; Notable for the following components:    Chloride 115 (*)     CO2 17 (*)     Creatinine 1.5 (*)     Albumin 3.3 (*)     eGFR 39.9 (*)     All other components within normal limits   URINALYSIS, REFLEX TO URINE CULTURE - Abnormal; Notable for the following components:    Color, UA Orange (*)     Protein, UA 2+ (*)     Occult Blood UA 3+ (*)     Leukocytes, UA 3+ (*)     All other components within normal limits    Narrative:     Specimen Source->Urine   URINALYSIS MICROSCOPIC - Abnormal; Notable for the following components:    RBC, UA 78 (*)     WBC, UA >100 (*)     WBC Clumps, UA Few (*)     Bacteria Many (*)     Yeast, UA Few (*)     Non-Squam Epith 3 (*)     All other components within normal limits    Narrative:     Specimen Source->Urine   CULTURE, URINE   HIV 1 / 2 ANTIBODY    Narrative:     Release to patient->Immediate   HEPATITIS C ANTIBODY    Narrative:     Release to patient->Immediate   LACTIC ACID, PLASMA          Imaging Results              CT Abdomen Pelvis With Contrast (Final result)  Result time 10/23/22 11:24:55      Final result by Carlos French MD (10/23/22 11:24:55)                   Impression:      1. No acute intra-abdominal abnormality.  2. Operative change of colon conduit urinary diversion with left lower quadrant urostomy and bilateral nephrostomy tubes.  3. Moderate bilateral hydroureteronephrosis, progressed in the right, with stable 3 mm density in the right proximal ureter.  4. Prominent common bile duct status post cholecystectomy with mild central intrahepatic biliary dilatation.  5. Additional findings and operative changes detailed above.      Electronically signed by: Carlos French MD  Date:    10/23/2022  Time:    11:24               Narrative:    EXAMINATION:  CT ABDOMEN PELVIS WITH CONTRAST    CLINICAL HISTORY:  Abdominal pain, acute,  nonlocalized;    TECHNIQUE:  The patient was surveyed from the lung bases through the pelvis after the administration of 75 cc Omni 350 IV contrast and data was reconstructed for coronal, sagittal, and axial images.  Oral contrast not administered.    COMPARISON:  CT 08/24/2022    FINDINGS:  Visualized heart is normal size.  No pericardial effusion.  Subsegmental atelectasis versus scarring at the lung bases.    Liver is normal size and attenuation.  No focal hepatic abnormality.  Continued prominence common bile duct status post cholecystectomy measuring 1.1 cm (previously 1.0 cm) with mild central intrahepatic biliary dilatation.    The spleen, adrenal glands, and pancreas unremarkable.  Continued mild prominence of the main pancreatic duct.    Kidneys enhance symmetrically.  Bilateral nephrostomy tubes remain in stable position with few subcentimeter right renal nonobstructing nephroliths, largest measuring 6 mm (axial image 70).  Operative change of colon conduit urinary diversion with left lower quadrant urostomy and moderate bilateral hydroureteronephrosis, mildly progressed on the right with continued mild ureteral wall thickening.  3 mm density again noted within the right proximal ureter (axial image 93.)  Urinary bladder is decompressed.  Operative change of hysterectomy.    Stomach is unremarkable.  Operative change of right hemicolectomy with lower quadrant ileostomy.  Long Charlotte's pouch is decompressed.  No bowel obstruction or inflammation.  Nonenlarged retroperitoneal mesenteric lymph nodes.  No intraperitoneal free air or free fluid.    Nonaneurysmal abdominal aorta with no significant calcific atherosclerosis.  Portal vein is patent.    No acute osseous abnormality.  Operative change of the ventral abdominal wall.                                       Medications   sodium chloride 0.9% bolus 1,000 mL (0 mLs Intravenous Stopped 10/23/22 1421)   morphine injection 4 mg (4 mg Intravenous Given  10/23/22 0949)   ondansetron injection 4 mg (4 mg Intravenous Given 10/23/22 0949)   iohexoL (OMNIPAQUE 350) injection 75 mL (75 mLs Intravenous Given 10/23/22 1056)   morphine injection 4 mg (4 mg Intravenous Given 10/23/22 1435)     Medical Decision Making:   History:   Old Medical Records: I decided to obtain old medical records.  Clinical Tests:   Lab Tests: Ordered and Reviewed  Radiological Study: Ordered and Reviewed     APC / Resident Notes:   57 y.o. year old female presenting with diarrhea.    DDx includes but is not limited to dehydration, colitis, UTI.    Pertinent lab findings include WBC 9, H&H 11/40, CO2 17, Cr 1.5, normal lactate. Urinalysis abnormal likely due to colonization, will follow culture results prior to starting antibiotics.    CT abdomen pelvis with no acute process. Moderate bilateral hydronephrosis with 3mm stone in the proximal right ureter (scheduled for removal Thursday), prominent CBD dilation.    Plan  Pain control  PCP f/u  Colostomy care provided per ED nursing    Discussed findings and plan with patient who verbalized understanding and agrees with the plan and course of treatment. Return to ED precautions discussed. Patient is stable for discharge. I discussed the care of this patient with my supervising physician.         Attending Attestation:     Physician Attestation Statement for NP/PA:   I have conducted a face to face encounter with this patient in addition to the NP/PA, due to NP/PA Request    Other NP/PA Attestation Additions:      Medical Decision Makin yo female p/w increased ostomy output and need for ostomy bags.  On exam, no distress, colostomy and urostomy bags in place.  Ostomy site pink and well appearing. Yellow urine in urostomy bag.  Non-tender abdomen.  No leukocytosis, no fever, normal lactate, not septic.  UA results likely contaminated - will follow culture results.  CT with progressing hydro and stable stone - has appt 10/27 with Urology.  Advised  to return to the ED if any new or worsening sx, such as fever.  Provided replacement ostomy bags.                         Clinical Impression:   Final diagnoses:  [K94.19] Altered bowel elimination due to intestinal ostomy (Primary)  [R19.7] Diarrhea, unspecified type      ED Disposition Condition    Discharge Stable          ED Prescriptions    None       Follow-up Information       Follow up With Specialties Details Why Contact Info Additional Information    [facility] Internal Medicine Schedule an appointment as soon as possible for a visit   [address]  [facility] [address] [phone] [facility] Please park in surface lot and check in at central registration desk             Stephanie Gill PA-C  10/23/22 1609       Vijay Abrams MD  10/24/22 [id number]

## 2022-10-24 ENCOUNTER — OFFICE VISIT (OUTPATIENT)
Dept: CARDIOLOGY | Facility: CLINIC | Age: 57
End: 2022-10-24
Payer: MEDICAID

## 2022-10-24 ENCOUNTER — TELEPHONE (OUTPATIENT)
Dept: SURGERY | Facility: CLINIC | Age: 57
End: 2022-10-24
Payer: MEDICAID

## 2022-10-24 VITALS
HEIGHT: 69 IN | WEIGHT: 187.81 LBS | HEART RATE: 78 BPM | BODY MASS INDEX: 27.82 KG/M2 | OXYGEN SATURATION: 99 % | DIASTOLIC BLOOD PRESSURE: 55 MMHG | SYSTOLIC BLOOD PRESSURE: 93 MMHG

## 2022-10-24 DIAGNOSIS — Z93.2 ILEOSTOMY IN PLACE: Primary | Chronic | ICD-10-CM

## 2022-10-24 DIAGNOSIS — Z01.810 PREPROCEDURAL CARDIOVASCULAR EXAMINATION: ICD-10-CM

## 2022-10-24 DIAGNOSIS — R06.09 DYSPNEA ON EXERTION: ICD-10-CM

## 2022-10-24 DIAGNOSIS — Z86.79 HISTORY OF ESSENTIAL HYPERTENSION: ICD-10-CM

## 2022-10-24 DIAGNOSIS — R07.9 CHEST PAIN, UNSPECIFIED TYPE: ICD-10-CM

## 2022-10-24 PROCEDURE — 99214 OFFICE O/P EST MOD 30 MIN: CPT | Mod: PBBFAC,PN | Performed by: NURSE PRACTITIONER

## 2022-10-24 PROCEDURE — 3074F PR MOST RECENT SYSTOLIC BLOOD PRESSURE < 130 MM HG: ICD-10-PCS | Mod: CPTII,,, | Performed by: NURSE PRACTITIONER

## 2022-10-24 PROCEDURE — 1160F RVW MEDS BY RX/DR IN RCRD: CPT | Mod: CPTII,,, | Performed by: NURSE PRACTITIONER

## 2022-10-24 PROCEDURE — 99213 OFFICE O/P EST LOW 20 MIN: CPT | Mod: S$PBB,,, | Performed by: NURSE PRACTITIONER

## 2022-10-24 PROCEDURE — 99999 PR PBB SHADOW E&M-EST. PATIENT-LVL IV: ICD-10-PCS | Mod: PBBFAC,,, | Performed by: NURSE PRACTITIONER

## 2022-10-24 PROCEDURE — 99213 PR OFFICE/OUTPT VISIT, EST, LEVL III, 20-29 MIN: ICD-10-PCS | Mod: S$PBB,,, | Performed by: NURSE PRACTITIONER

## 2022-10-24 PROCEDURE — 1160F PR REVIEW ALL MEDS BY PRESCRIBER/CLIN PHARMACIST DOCUMENTED: ICD-10-PCS | Mod: CPTII,,, | Performed by: NURSE PRACTITIONER

## 2022-10-24 PROCEDURE — 99999 PR PBB SHADOW E&M-EST. PATIENT-LVL IV: CPT | Mod: PBBFAC,,, | Performed by: NURSE PRACTITIONER

## 2022-10-24 PROCEDURE — 1159F PR MEDICATION LIST DOCUMENTED IN MEDICAL RECORD: ICD-10-PCS | Mod: CPTII,,, | Performed by: NURSE PRACTITIONER

## 2022-10-24 PROCEDURE — 1159F MED LIST DOCD IN RCRD: CPT | Mod: CPTII,,, | Performed by: NURSE PRACTITIONER

## 2022-10-24 PROCEDURE — 3078F DIAST BP <80 MM HG: CPT | Mod: CPTII,,, | Performed by: NURSE PRACTITIONER

## 2022-10-24 PROCEDURE — 3074F SYST BP LT 130 MM HG: CPT | Mod: CPTII,,, | Performed by: NURSE PRACTITIONER

## 2022-10-24 PROCEDURE — 3078F PR MOST RECENT DIASTOLIC BLOOD PRESSURE < 80 MM HG: ICD-10-PCS | Mod: CPTII,,, | Performed by: NURSE PRACTITIONER

## 2022-10-24 NOTE — PATIENT INSTRUCTIONS
Your stress test did not suggest a blockage    Your echocardiogram looked ok    Your cholesterol looks ok as well    Reach out to your PCP today to discuss the increased output from your stoma and the weakness     Stay hydrated    Ok to proceed with the surgery with Dr. Russ from a cardiac stand point

## 2022-10-24 NOTE — TELEPHONE ENCOUNTER
Spoke with Carmen () and appointment scheduled to see Paulina regarding ostomy care. Carmen states that she will call patient and make her aware of appointment details.

## 2022-10-24 NOTE — PROGRESS NOTES
Cardiology    10/24/2022  11:47 AM    Problem list  Patient Active Problem List   Diagnosis    Cervical cancer    Osteoarthritis of back    History of essential hypertension    Lower extremity pain, diffuse    Weakness of both lower extremities    Impaired functional mobility, balance, gait, and endurance    Colon polyp    Internal hemorrhoids    Severe episode of recurrent major depressive disorder, with psychotic features    Fibromyalgia    Sleep stage dysfunction    Carpal tunnel syndrome on right    History of cervical cancer    Metastatic squamous cell carcinoma to lymph node    Generalized anxiety disorder    PTSD (post-traumatic stress disorder)    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 drug    Seizure-like activity    ODESSA (acute kidney injury)    Anemia due to chronic kidney disease    Pyelonephritis    Hydronephrosis    Family history of colon cancer    Abnormal mammogram    Radiation cystitis    Bilateral ureteral obstruction    Moderate malnutrition    Anemia due to chronic blood loss    Vitamin D deficiency    Ileostomy in place    History of DVT (deep vein thrombosis)    Presence of urostomy    QT prolongation    Metabolic acidosis    Hard to intubate    Moderate episode of recurrent major depressive disorder    Nephrostomy status    Depression    Physical deconditioning    Urinary tract infection associated with nephrostomy catheter    Chest pain    Dyspnea on exertion       CC:  Results review    HPI:  Having lots   Dr. Keli Mustafa is PCP- having significant output from ostomy- feels weak and her BP is low. She has to go to the ED to get new bags. Goes to wound care at [facility] and is unsure when she can follow up.  Trying to stay hydrated.  She has no chest pain or breathing prooblems- just weak    Medications  Current Outpatient Medications   Medication Sig Dispense Refill    acetaminophen (TYLENOL) 325 MG tablet Take 2 tablets (650 mg total) by mouth 3 (three) times daily.  0     amoxicillin-clavulanate 875-125mg (AUGMENTIN) 875-125 mg per tablet Take 1 tablet by mouth every 12 (twelve) hours. for 5 days 10 tablet 0    gabapentin (NEURONTIN) 100 MG capsule Take 2 capsules (200 mg total) by mouth every evening. 90 capsule 3    ketorolac (TORADOL) 10 mg tablet Take 1 tablet (10 mg total) by mouth every 6 (six) hours as needed for Pain. 30 tablet 0    loperamide (IMODIUM A-D) 2 mg Tab Take 1 tablet (2 mg total) by mouth 4 (four) times daily as needed (diarrhea). 120 tablet 0    melatonin (MELATIN) 3 mg tablet Take 2 tablets (6 mg total) by mouth nightly as needed for Insomnia. 60 tablet 0    mirtazapine (REMERON SOL-TAB) 15 MG disintegrating tablet Dissolve 1 tablet (15 mg total) by mouth nightly. 30 tablet 11    sodium bicarbonate 650 MG tablet Take 2 tablets (1,300 mg total) by mouth 2 (two) times daily. 120 tablet 11    sucralfate (CARAFATE) 1 gram tablet Take 1 tablet (1 g total) by mouth 4 (four) times daily before meals and nightly. 120 tablet 2     No current facility-administered medications for this visit.      Prior to Admission medications    Medication Sig Start Date End Date Taking? Authorizing Provider   acetaminophen (TYLENOL) 325 MG tablet Take 2 tablets (650 mg total) by mouth 3 (three) times daily. 9/1/22   Danuta Barboza MD   amoxicillin-clavulanate 875-125mg (AUGMENTIN) 875-125 mg per tablet Take 1 tablet by mouth every 12 (twelve) hours. for 5 days 10/23/22 10/28/22  Karina Beverly MD   gabapentin (NEURONTIN) 100 MG capsule Take 2 capsules (200 mg total) by mouth every evening. 9/1/22   Danuta Barboza MD   ketorolac (TORADOL) 10 mg tablet Take 1 tablet (10 mg total) by mouth every 6 (six) hours as needed for Pain. 9/8/22   Denise Brooks NP   loperamide (IMODIUM A-D) 2 mg Tab Take 1 tablet (2 mg total) by mouth 4 (four) times daily as needed (diarrhea). 9/22/22 12/21/22  Addie Mathews SARAH   melatonin (MELATIN) 3 mg tablet Take 2 tablets (6 mg total) by  mouth nightly as needed for Insomnia. 9/6/21   Kacey Bergeron MD   mirtazapine (REMERON SOL-TAB) 15 MG disintegrating tablet Dissolve 1 tablet (15 mg total) by mouth nightly. 2/15/22 2/15/23  Diony Ram MD   sodium bicarbonate 650 MG tablet Take 2 tablets (1,300 mg total) by mouth 2 (two) times daily. 2/15/22 2/15/23  Diony Ram MD   sucralfate (CARAFATE) 1 gram tablet Take 1 tablet (1 g total) by mouth 4 (four) times daily before meals and nightly. 9/1/22   Danuta Barboza MD         History  Past Medical History:   Diagnosis Date    Abnormal mammogram 08/25/2020    Acute blood loss anemia     Acute deep vein thrombosis (DVT) of lower extremity 12/09/2020    Advance care planning 04/30/2021    Anemia due to chronic blood loss     Anemia due to chronic kidney disease     Anxiety     Cardiovascular event risk -- low 09/14/2015    ASCVD 10-year risk 1.9% (with optimal risk factors 1.3%) as of 9/14/2015     Cervical cancer 2014    Chronic back pain     Colostomy care     Deep vein thrombosis     Depression     Diarrhea due to malabsorption 11/14/2018    Diarrhea due to malabsorption 11/14/2018    Difficult intubation     Disorder of kidney and ureter     DVT of lower extremity, bilateral 11/04/2020    Fibromyalgia     Fungemia 09/27/2020    Generalized abdominal pain 08/25/2020    GERD (gastroesophageal reflux disease)     Hemifacial spasm 09/16/2015    Hiatal hernia 2014    History of cervical cancer 10/11/2018    Hx of psychiatric care     Cymbalta, trazodone    Hypertension     Hypomagnesemia 11/21/2018    Lactose intolerance     Metastatic squamous cell carcinoma to lymph node 10/11/2018    Nephrostomy tube displaced     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  Bx were negative.     Seizures     Urinary tract infection associated with nephrostomy catheter 06/11/2020    Wound infection after surgery 09/24/2020     Past Surgical History:   Procedure Laterality Date    ANTEGRADE NEPHROSTOGRAPHY Bilateral 9/28/2020    Procedure: Nephrostogram - antegrade;  Surgeon: Leslie Balbuena MD;  [facility];  Service: Urology;  Laterality: Bilateral;    ANTEGRADE NEPHROSTOGRAPHY Left 4/20/2021    Procedure: NEPHROSTOGRAM, ANTEGRADE;  Surgeon: Leslie Balbuena MD;  Location: [facility] OR [address] [place] [address];  Service: Urology;  Laterality: Left;    BILATERAL OOPHORECTOMY  2015    CHOLECYSTECTOMY  11/09/2016    Done at [facility], path showed chronic cholecystitis and gallstones    cold knife conization  2014    COLECTOMY, RIGHT  9/17/2020    Procedure: COLECTOMY, RIGHT Extended;  Surgeon: Hammad Reynolds MD;  Location: [facility] OR 2ND FLR;  Service: Colon and Rectal;;    COLONOSCOPY  2014    COLONOSCOPY N/A 10/24/2016    at [facility], Dr Gutiérrez    COLONOSCOPY N/A 5/18/2018    Procedure: COLONOSCOPY;  Surgeon: Arden Gutiérrez MD;  Location: [facility] (4TH FLR);  Service: Endoscopy;  Laterality: N/A;    COLONOSCOPY N/A 7/28/2020    Procedure: COLONOSCOPY;  Surgeon: Hammad Reynolds MD;  Location: [facility] (4TH FLR);  Service: Colon and Rectal;  Laterality: N/A;  covid test [place] 7/25    CYSTOSCOPY WITH URETEROSCOPY, RETROGRADE PYELOGRAPHY, AND INSERTION OF STENT Bilateral 3/21/2020    Procedure: CYSTOSCOPY, WITH RETROGRADE PYELOGRAM,;  Surgeon: Leslie Balbuena MD;  Location: [facility] OR [address] [place] [address];  Service: Urology;  Laterality: Bilateral;    ESOPHAGOGASTRODUODENOSCOPY  2014    ESOPHAGOGASTRODUODENOSCOPY  11/18/2020    ESOPHAGOGASTRODUODENOSCOPY N/A 11/18/2020    Procedure: ESOPHAGOGASTRODUODENOSCOPY (EGD);  Surgeon: Zenon Spencer MD;  Location: [facility];  Service: Endoscopy;  Laterality: N/A;    ESOPHAGOGASTRODUODENOSCOPY N/A 12/11/2020    Procedure: EGD (ESOPHAGOGASTRODUODENOSCOPY);   Surgeon: Juancho Muse MD;  Location: [facility] ENDO (2ND FLR);  Service: Endoscopy;  Laterality: N/A;    HYSTERECTOMY      TVH for cervical cancer    ILEOSTOMY  2020    Procedure: CREATION, ILEOSTOMY;  Surgeon: Hammad Reynolds MD;  Location: [facility] OR 2ND FLR;  Service: Colon and Rectal;;    LOOPOGRAM N/A 2021    Procedure: LOOPOGRAM;  Surgeon: Leslie Balbuena MD;  Location: [facility] OR 1ST FLR;  Service: Urology;  Laterality: N/A;    MOBILIZATION OF SPLENIC FLEXURE N/A 2020    Procedure: MOBILIZATION, COLONIC;  Surgeon: Hammad Reynolds MD;  Location: [facility] OR 2ND FLR;  Service: Colon and Rectal;  Laterality: N/A;    NEPHROSTOGRAPHY Bilateral 2021    Procedure: Nephrostogram;  Surgeon: Celeste Surgeon;  Location: [facility];  Service: Anesthesiology;  Laterality: Bilateral;    OOPHORECTOMY      RETROPERITONEAL LYMPHADENECTOMY Right 2018    Procedure: LYMPHADENECTOMY, RETROPERITONEUM;  Surgeon: Sebas Reed MD;  Location: [facility] OR 2ND FLR;  Service: General;  Laterality: Right;  ILIAC     Social History     Socioeconomic History    Marital status:      Spouse name: Hammad    Number of children: 2   Tobacco Use    Smoking status: Former    Smokeless tobacco: Never   Substance and Sexual Activity    Alcohol use: No     Alcohol/week: 0.0 standard drinks    Drug use: No    Sexual activity: Yes     Partners: Male     Birth control/protection: None     Comment:  19 years since    Social History Narrative    , twin daughters (1  2018), disabled due to childhood stroke, Caodaism sophia     Social Determinants of Health     Financial Resource Strain: Low Risk     Difficulty of Paying Living Expenses: Not very hard   Food Insecurity: No Food Insecurity    Worried About Running Out of Food in the Last Year: Never true    Ran Out of Food in the Last Year: Never true   Transportation Needs: No Transportation Needs    Lack of Transportation (Medical): No    Lack of  Transportation (Non-Medical): No   Physical Activity: Inactive    Days of Exercise per Week: 0 days    Minutes of Exercise per Session: 0 min   Stress: No Stress Concern Present    Feeling of Stress : Only a little   Social Connections: Moderately Isolated    Frequency of Communication with Friends and Family: More than three times a week    Frequency of Social Gatherings with Friends and Family: More than three times a week    Attends [employer] Services: Never    Active Member of Clubs or Organizations: No    Attends Club or Organization Meetings: Never    Marital Status:    Housing Stability: Low Risk     Unable to Pay for Housing in the Last Year: No    Number of Places Lived in the Last Year: 1    Unstable Housing in the Last Year: No         Allergies  Review of patient's allergies indicates:   Allergen Reactions    Bee sting [allergen ext-venom-honey bee]      Rash      Grass pollen-bermuda, standard      rash         Review of Systems   Review of Systems   Constitutional: Positive for malaise/fatigue. Negative for diaphoresis.   HENT: Negative.     Cardiovascular:  Negative for chest pain, claudication, dyspnea on exertion, irregular heartbeat, leg swelling, near-syncope, orthopnea, palpitations, paroxysmal nocturnal dyspnea and syncope.   Respiratory:  Negative for shortness of breath.    Endocrine: Negative for polydipsia, polyphagia and polyuria.   Hematologic/Lymphatic: Does not bruise/bleed easily.   Gastrointestinal:  Positive for diarrhea (increased output form stoma). Negative for bloating, nausea and vomiting.   Genitourinary: Negative.    Neurological:  Positive for weakness. Negative for excessive daytime sleepiness, dizziness, light-headedness and loss of balance.   Psychiatric/Behavioral:  The patient is not nervous/anxious.    Allergic/Immunologic: Negative.        Physical Exam  Wt Readings from Last 1 Encounters:   10/24/22 85.2 kg (187 lb 13.3 oz)     BP Readings from Last 3  "Encounters:   10/24/22 (!) 93/55   10/23/22 (!) 96/53   10/16/22 (!) 128/91     Pulse Readings from Last 1 Encounters:   10/24/22 78     Body mass index is 27.74 kg/m².    Physical Exam  Vitals and nursing note reviewed.   Constitutional:       Appearance: Normal appearance.   HENT:      Head: Normocephalic and atraumatic.      Mouth/Throat:      Mouth: Mucous membranes are moist.   Eyes:      Pupils: Pupils are equal, round, and reactive to light.   Cardiovascular:      Rate and Rhythm: Normal rate and regular rhythm.      Pulses:           Radial pulses are 2+ on the right side and 2+ on the left side.        Dorsalis pedis pulses are 2+ on the right side and 2+ on the left side.        Posterior tibial pulses are 2+ on the right side and 2+ on the left side.      Heart sounds: No murmur heard.  Pulmonary:      Effort: Pulmonary effort is normal. No respiratory distress.      Breath sounds: Normal breath sounds.   Abdominal:      General: There is no distension.      Tenderness: There is no abdominal tenderness.   Musculoskeletal:      Cervical back: Normal range of motion.      Right lower leg: No edema.      Left lower leg: No edema.   Skin:     General: Skin is warm and dry.      Findings: No erythema.   Neurological:      General: No focal deficit present.      Mental Status: She is alert.   Psychiatric:         Mood and Affect: Mood normal.         Behavior: Behavior normal.               Problem List Items Addressed This Visit          Cardiac/Vascular    History of essential hypertension    Overview     Not requiring medications at this time  BP is low- concern that this may correlate with increased stool output from stoma. Encourage her to contact GI and her PCP.         Current Assessment & Plan     As above         Chest pain    Overview     Unclear etiology  Has "mild" aortic calcifications on previous chest imaging  Chest pain has been ongoing for sometime- difficult to discern if pain is due to GERD " or if it correlates with a cardiac cause.  Stress test negative         Current Assessment & Plan     As above         Dyspnea on exertion    Overview     Nuclear stress test negative           Current Assessment & Plan     Do not suspect cardiac cause for her chest discomfort         Preprocedural cardiovascular examination    Overview     Stress test negative for ischemia  Do not recommend against proceeding with nephrolithotomy from cardiac perspective  Do recommend she contact PCP/GI to discuss increased stool output from stoma           Current Assessment & Plan     As above            GI    Ileostomy in place - Primary (Chronic)    Relevant Orders    Ambulatory referral/consult to Wound Clinic         Follow Up  PRN      @Sherlyn Valle DNP

## 2022-10-24 NOTE — TELEPHONE ENCOUNTER
----- Message from Jeannie Theodore sent at 10/24/2022 12:14 PM CDT -----  Contact: [phone]  Carmen  called to schedule follow up appt nothing lamar please call and advise @ [phone]

## 2022-10-25 ENCOUNTER — PES CALL (OUTPATIENT)
Dept: ADMINISTRATIVE | Facility: CLINIC | Age: 57
End: 2022-10-25
Payer: MEDICAID

## 2022-10-25 PROBLEM — Z01.810 PREPROCEDURAL CARDIOVASCULAR EXAMINATION: Status: ACTIVE | Noted: 2022-10-25

## 2022-10-25 NOTE — ASSESSMENT & PLAN NOTE
Stress test negative for ischemia  Do not recommend against proceeding with nephrolithotomy from cardiac perspective  Do recommend she contact PCP/GI to discuss increased stool output from stoma

## 2022-10-26 ENCOUNTER — OFFICE VISIT (OUTPATIENT)
Dept: WOUND CARE | Facility: CLINIC | Age: 57
End: 2022-10-26
Payer: MEDICAID

## 2022-10-26 ENCOUNTER — TELEPHONE (OUTPATIENT)
Dept: UROLOGY | Facility: CLINIC | Age: 57
End: 2022-10-26
Payer: MEDICAID

## 2022-10-26 DIAGNOSIS — K94.19 IRRITANT CONTACT DERMATITIS DUE TO ILEOSTOMY: ICD-10-CM

## 2022-10-26 DIAGNOSIS — Z93.2 HIGH OUTPUT ILEOSTOMY: ICD-10-CM

## 2022-10-26 DIAGNOSIS — L24.9 IRRITANT CONTACT DERMATITIS DUE TO ILEOSTOMY: ICD-10-CM

## 2022-10-26 DIAGNOSIS — Z43.6 ATTENTION TO UROSTOMY: ICD-10-CM

## 2022-10-26 DIAGNOSIS — R19.8 HIGH OUTPUT ILEOSTOMY: ICD-10-CM

## 2022-10-26 DIAGNOSIS — Z43.2 ATTENTION TO ILEOSTOMY: Primary | ICD-10-CM

## 2022-10-26 PROCEDURE — 99999 PR PBB SHADOW E&M-EST. PATIENT-LVL III: ICD-10-PCS | Mod: PBBFAC,,, | Performed by: CLINICAL NURSE SPECIALIST

## 2022-10-26 PROCEDURE — 99999 PR PBB SHADOW E&M-EST. PATIENT-LVL III: CPT | Mod: PBBFAC,,, | Performed by: CLINICAL NURSE SPECIALIST

## 2022-10-26 PROCEDURE — 99215 PR OFFICE/OUTPT VISIT, EST, LEVL V, 40-54 MIN: ICD-10-PCS | Mod: S$PBB,,, | Performed by: CLINICAL NURSE SPECIALIST

## 2022-10-26 PROCEDURE — 1160F RVW MEDS BY RX/DR IN RCRD: CPT | Mod: CPTII,,, | Performed by: CLINICAL NURSE SPECIALIST

## 2022-10-26 PROCEDURE — 99213 OFFICE O/P EST LOW 20 MIN: CPT | Mod: PBBFAC | Performed by: CLINICAL NURSE SPECIALIST

## 2022-10-26 PROCEDURE — 1160F PR REVIEW ALL MEDS BY PRESCRIBER/CLIN PHARMACIST DOCUMENTED: ICD-10-PCS | Mod: CPTII,,, | Performed by: CLINICAL NURSE SPECIALIST

## 2022-10-26 PROCEDURE — 99215 OFFICE O/P EST HI 40 MIN: CPT | Mod: S$PBB,,, | Performed by: CLINICAL NURSE SPECIALIST

## 2022-10-26 PROCEDURE — 1159F PR MEDICATION LIST DOCUMENTED IN MEDICAL RECORD: ICD-10-PCS | Mod: CPTII,,, | Performed by: CLINICAL NURSE SPECIALIST

## 2022-10-26 PROCEDURE — 1159F MED LIST DOCD IN RCRD: CPT | Mod: CPTII,,, | Performed by: CLINICAL NURSE SPECIALIST

## 2022-10-26 RX ORDER — LOPERAMIDE HCL 2 MG
4 TABLET ORAL 4 TIMES DAILY PRN
Qty: 240 TABLET | Refills: 6 | Status: SHIPPED | OUTPATIENT
Start: 2022-10-26 | End: 2022-11-25

## 2022-10-26 NOTE — TELEPHONE ENCOUNTER
Called pt to confirm arrival time of 530am for procedure on 10/27/2022. Gave pt NPO instructions and gave pt opportunity to ask questions. Pt verbalized understanding.

## 2022-10-26 NOTE — PROGRESS NOTES
"Subjective:       Patient ID: Edita Riley is a 57 y.o. female.    Chief Complaint: Urostomy, Ileostomy, and Skin Problem    This is a visit one year after first meeting and today she is accompanied by her new  Carmen Sanchez,    she has an ileostomy and urostomy since 2020 . She is still having issues with her pouches leaking and she is making many visits to ED for supplies and having leaks daily.  Her  did not want to come and per pt he is trying to get her to go to a nursing home , as he is not really willing to help her with all her health issues.   She is having kidney stone surgery tomorrow with Dr Russ  and she also has bilateral nephrostomy tubes now as well set for exchange in IR next week       Review of Systems   Constitutional:  Negative for activity change and fever.   HENT: Negative.     Respiratory: Negative.     Cardiovascular: Negative.    Gastrointestinal:         Ileostomy leaks every day and supply issues    Genitourinary:         Bilateral neph tubes and urine bit cloudy    Integumentary:  Positive for rash. Negative for wound.       Objective:      Physical Exam  Constitutional:       Appearance: Normal appearance.   Pulmonary:      Effort: Pulmonary effort is normal. No respiratory distress.   Abdominal:      General: Abdomen is flat. Bowel sounds are normal.      Palpations: Abdomen is soft.   Musculoskeletal:         General: Normal range of motion.   Skin:     Findings: Rash present.       5/21  OSTOMYS POUCHED UPON ARRIVAL     2021      Oct 2022  Ileostomy right SIDE STOMA IS  SLIGHTLY OVAL AT 3 OCLOCK SIDE AND PT MUST ROUND OUT HER STOMA FOR POUCH PLACING   GAVE HER HOLLISER NEW IMAGE PRECUT 1" TODAY TO TRY  AND DELT       2021       Oct 2022  GAVE HER CONVEX COLOPLAST TODAY (10)   She is having trouble with [facility] and not getting adequate supplies and is very unhappy .    I WILL SWITCH HER TO [facility] TODAY FOR SUPPLIES GOING FORWARD    Assessment: "       1. Attention to ileostomy    2. Irritant contact dermatitis due to ileostomy    3. Attention to urostomy    4. High output ileostomy        Plan:       Changed all her pouching systems GAVE HER 10 OF EACH TYPE TODAY   Will send NEW RX to [facility] and she is to cancel [facility]   Instructed to take imodium  4 x day  8 tabs without fail and new RX sent   Diet discussed   Her independence and help from new  she is advised to not involve her spouse in care decisions for now as he just drops her at ED.   I have reviewed the plan of care with the patient and/new CM  and they express understanding. I spent over 50% of this 40 minute visit in face to face counseling.

## 2022-10-27 ENCOUNTER — ANESTHESIA (OUTPATIENT)
Dept: SURGERY | Facility: HOSPITAL | Age: 57
End: 2022-10-27
Payer: MEDICAID

## 2022-10-27 ENCOUNTER — ANESTHESIA EVENT (OUTPATIENT)
Dept: SURGERY | Facility: HOSPITAL | Age: 57
End: 2022-10-27
Payer: MEDICAID

## 2022-10-27 ENCOUNTER — HOSPITAL ENCOUNTER (OUTPATIENT)
Facility: HOSPITAL | Age: 57
Discharge: HOME | End: 2022-10-27
Attending: UROLOGY | Admitting: UROLOGY
Payer: MEDICAID

## 2022-10-27 VITALS
HEART RATE: 76 BPM | RESPIRATION RATE: 18 BRPM | TEMPERATURE: 98 F | DIASTOLIC BLOOD PRESSURE: 60 MMHG | OXYGEN SATURATION: 100 % | HEIGHT: 69 IN | BODY MASS INDEX: 27.7 KG/M2 | SYSTOLIC BLOOD PRESSURE: 113 MMHG | WEIGHT: 187 LBS

## 2022-10-27 DIAGNOSIS — N20.9 UROLITHIASIS: ICD-10-CM

## 2022-10-27 DIAGNOSIS — N20.1 URETERAL STONE: Primary | ICD-10-CM

## 2022-10-27 PROCEDURE — C1758 CATHETER, URETERAL: HCPCS | Performed by: UROLOGY

## 2022-10-27 PROCEDURE — D9220A PRA ANESTHESIA: Mod: CRNA,,, | Performed by: STUDENT IN AN ORGANIZED HEALTH CARE EDUCATION/TRAINING PROGRAM

## 2022-10-27 PROCEDURE — C1729 CATH, DRAINAGE: HCPCS | Performed by: UROLOGY

## 2022-10-27 PROCEDURE — 36000706: Performed by: UROLOGY

## 2022-10-27 PROCEDURE — 71000015 HC POSTOP RECOV 1ST HR: Performed by: UROLOGY

## 2022-10-27 PROCEDURE — D9220A PRA ANESTHESIA: Mod: ANES,,, | Performed by: ANESTHESIOLOGY

## 2022-10-27 PROCEDURE — C1726 CATH, BAL DIL, NON-VASCULAR: HCPCS | Performed by: UROLOGY

## 2022-10-27 PROCEDURE — 37000008 HC ANESTHESIA 1ST 15 MINUTES: Performed by: UROLOGY

## 2022-10-27 PROCEDURE — 25000003 PHARM REV CODE 250

## 2022-10-27 PROCEDURE — D9220A PRA ANESTHESIA: ICD-10-PCS | Mod: ANES,,, | Performed by: ANESTHESIOLOGY

## 2022-10-27 PROCEDURE — D9220A PRA ANESTHESIA: ICD-10-PCS | Mod: CRNA,,, | Performed by: STUDENT IN AN ORGANIZED HEALTH CARE EDUCATION/TRAINING PROGRAM

## 2022-10-27 PROCEDURE — 25500020 PHARM REV CODE 255: Performed by: UROLOGY

## 2022-10-27 PROCEDURE — C1769 GUIDE WIRE: HCPCS | Performed by: UROLOGY

## 2022-10-27 PROCEDURE — 36000707: Performed by: UROLOGY

## 2022-10-27 PROCEDURE — 27201423 OPTIME MED/SURG SUP & DEVICES STERILE SUPPLY: Performed by: UROLOGY

## 2022-10-27 PROCEDURE — 50080 PR PERCUT REMV KID STONE,UP TO 2 CM: ICD-10-PCS | Mod: RT,,, | Performed by: UROLOGY

## 2022-10-27 PROCEDURE — 50080 PERQ NL/PL LITHOTRP SMPL<2CM: CPT | Mod: RT,,, | Performed by: UROLOGY

## 2022-10-27 PROCEDURE — C1894 INTRO/SHEATH, NON-LASER: HCPCS | Performed by: UROLOGY

## 2022-10-27 PROCEDURE — 25000003 PHARM REV CODE 250: Performed by: STUDENT IN AN ORGANIZED HEALTH CARE EDUCATION/TRAINING PROGRAM

## 2022-10-27 PROCEDURE — 82365 CALCULUS SPECTROSCOPY: CPT | Performed by: UROLOGY

## 2022-10-27 PROCEDURE — 50435 PR EXCHANGE NEPHROSTOMY CATH, INCL GUID, S&I: ICD-10-PCS | Mod: 51,RT,, | Performed by: UROLOGY

## 2022-10-27 PROCEDURE — 63600175 PHARM REV CODE 636 W HCPCS: Performed by: STUDENT IN AN ORGANIZED HEALTH CARE EDUCATION/TRAINING PROGRAM

## 2022-10-27 PROCEDURE — 71000044 HC DOSC ROUTINE RECOVERY FIRST HOUR: Performed by: UROLOGY

## 2022-10-27 PROCEDURE — 37000009 HC ANESTHESIA EA ADD 15 MINS: Performed by: UROLOGY

## 2022-10-27 PROCEDURE — 50435 EXCHANGE NEPHROSTOMY CATH: CPT | Mod: 51,RT,, | Performed by: UROLOGY

## 2022-10-27 RX ORDER — DEXAMETHASONE SODIUM PHOSPHATE 4 MG/ML
INJECTION, SOLUTION INTRA-ARTICULAR; INTRALESIONAL; INTRAMUSCULAR; INTRAVENOUS; SOFT TISSUE
Status: DISCONTINUED | OUTPATIENT
Start: 2022-10-27 | End: 2022-10-27

## 2022-10-27 RX ORDER — HYDROMORPHONE HYDROCHLORIDE 1 MG/ML
0.2 INJECTION, SOLUTION INTRAMUSCULAR; INTRAVENOUS; SUBCUTANEOUS EVERY 5 MIN PRN
Status: DISCONTINUED | OUTPATIENT
Start: 2022-10-27 | End: 2022-10-27 | Stop reason: HOSPADM

## 2022-10-27 RX ORDER — SODIUM CHLORIDE 9 MG/ML
INJECTION, SOLUTION INTRAVENOUS CONTINUOUS
Status: DISCONTINUED | OUTPATIENT
Start: 2022-10-27 | End: 2022-10-27 | Stop reason: HOSPADM

## 2022-10-27 RX ORDER — HALOPERIDOL 5 MG/ML
0.5 INJECTION INTRAMUSCULAR EVERY 10 MIN PRN
Status: DISCONTINUED | OUTPATIENT
Start: 2022-10-27 | End: 2022-10-27 | Stop reason: HOSPADM

## 2022-10-27 RX ORDER — ACETAMINOPHEN 500 MG
1000 TABLET ORAL
Status: COMPLETED | OUTPATIENT
Start: 2022-10-27 | End: 2022-10-27

## 2022-10-27 RX ORDER — FENTANYL CITRATE 50 UG/ML
INJECTION, SOLUTION INTRAMUSCULAR; INTRAVENOUS
Status: DISCONTINUED | OUTPATIENT
Start: 2022-10-27 | End: 2022-10-27

## 2022-10-27 RX ORDER — PHENYLEPHRINE HYDROCHLORIDE 10 MG/ML
INJECTION INTRAVENOUS
Status: DISCONTINUED | OUTPATIENT
Start: 2022-10-27 | End: 2022-10-27

## 2022-10-27 RX ORDER — SODIUM CHLORIDE 0.9 % (FLUSH) 0.9 %
3 SYRINGE (ML) INJECTION
Status: DISCONTINUED | OUTPATIENT
Start: 2022-10-27 | End: 2022-10-27 | Stop reason: HOSPADM

## 2022-10-27 RX ORDER — ROCURONIUM BROMIDE 10 MG/ML
INJECTION, SOLUTION INTRAVENOUS
Status: DISCONTINUED | OUTPATIENT
Start: 2022-10-27 | End: 2022-10-27

## 2022-10-27 RX ORDER — LIDOCAINE HCL/PF 100 MG/5ML
SYRINGE (ML) INTRAVENOUS
Status: DISCONTINUED | OUTPATIENT
Start: 2022-10-27 | End: 2022-10-27

## 2022-10-27 RX ORDER — METRONIDAZOLE 500 MG/1
500 TABLET ORAL
Status: DISCONTINUED | OUTPATIENT
Start: 2022-10-27 | End: 2022-10-27 | Stop reason: HOSPADM

## 2022-10-27 RX ORDER — ONDANSETRON 2 MG/ML
INJECTION INTRAMUSCULAR; INTRAVENOUS
Status: DISCONTINUED | OUTPATIENT
Start: 2022-10-27 | End: 2022-10-27

## 2022-10-27 RX ORDER — CEFAZOLIN SODIUM/WATER 2 G/20 ML
2 SYRINGE (ML) INTRAVENOUS
Status: COMPLETED | OUTPATIENT
Start: 2022-10-27 | End: 2022-10-27

## 2022-10-27 RX ORDER — LIDOCAINE HYDROCHLORIDE 10 MG/ML
INJECTION, SOLUTION EPIDURAL; INFILTRATION; INTRACAUDAL; PERINEURAL
Status: DISCONTINUED
Start: 2022-10-27 | End: 2022-10-27 | Stop reason: HOSPADM

## 2022-10-27 RX ORDER — TRAMADOL HYDROCHLORIDE 50 MG/1
50 TABLET ORAL EVERY 6 HOURS
Qty: 5 TABLET | Refills: 0 | Status: ON HOLD | OUTPATIENT
Start: 2022-10-27 | End: 2023-02-18 | Stop reason: HOSPADM

## 2022-10-27 RX ORDER — PROPOFOL 10 MG/ML
VIAL (ML) INTRAVENOUS
Status: DISCONTINUED | OUTPATIENT
Start: 2022-10-27 | End: 2022-10-27

## 2022-10-27 RX ORDER — PREGABALIN 75 MG/1
75 CAPSULE ORAL
Status: COMPLETED | OUTPATIENT
Start: 2022-10-27 | End: 2022-10-27

## 2022-10-27 RX ORDER — MIDAZOLAM HYDROCHLORIDE 1 MG/ML
INJECTION INTRAMUSCULAR; INTRAVENOUS
Status: DISCONTINUED | OUTPATIENT
Start: 2022-10-27 | End: 2022-10-27

## 2022-10-27 RX ADMIN — LIDOCAINE HYDROCHLORIDE 100 MG: 20 INJECTION, SOLUTION INTRAVENOUS at 07:10

## 2022-10-27 RX ADMIN — PHENYLEPHRINE HYDROCHLORIDE 100 MCG: 10 INJECTION INTRAVENOUS at 07:10

## 2022-10-27 RX ADMIN — Medication 2 G: at 07:10

## 2022-10-27 RX ADMIN — ONDANSETRON 4 MG: 2 INJECTION INTRAMUSCULAR; INTRAVENOUS at 07:10

## 2022-10-27 RX ADMIN — PHENYLEPHRINE HYDROCHLORIDE 50 MCG: 10 INJECTION INTRAVENOUS at 08:10

## 2022-10-27 RX ADMIN — PROPOFOL 120 MG: 10 INJECTION, EMULSION INTRAVENOUS at 07:10

## 2022-10-27 RX ADMIN — SODIUM CHLORIDE, SODIUM GLUCONATE, SODIUM ACETATE, POTASSIUM CHLORIDE, MAGNESIUM CHLORIDE, SODIUM PHOSPHATE, DIBASIC, AND POTASSIUM PHOSPHATE: .53; .5; .37; .037; .03; .012; .00082 INJECTION, SOLUTION INTRAVENOUS at 08:10

## 2022-10-27 RX ADMIN — PHENYLEPHRINE HYDROCHLORIDE 200 MCG: 10 INJECTION INTRAVENOUS at 07:10

## 2022-10-27 RX ADMIN — DEXAMETHASONE SODIUM PHOSPHATE 4 MG: 4 INJECTION, SOLUTION INTRAMUSCULAR; INTRAVENOUS at 07:10

## 2022-10-27 RX ADMIN — ROCURONIUM BROMIDE 10 MG: 10 INJECTION INTRAVENOUS at 07:10

## 2022-10-27 RX ADMIN — ROCURONIUM BROMIDE 50 MG: 10 INJECTION INTRAVENOUS at 07:10

## 2022-10-27 RX ADMIN — SUGAMMADEX 200 MG: 100 INJECTION, SOLUTION INTRAVENOUS at 08:10

## 2022-10-27 RX ADMIN — PHENYLEPHRINE HYDROCHLORIDE 100 MCG: 10 INJECTION INTRAVENOUS at 08:10

## 2022-10-27 RX ADMIN — PREGABALIN 75 MG: 75 CAPSULE ORAL at 06:10

## 2022-10-27 RX ADMIN — FENTANYL CITRATE 100 MCG: 50 INJECTION, SOLUTION INTRAMUSCULAR; INTRAVENOUS at 07:10

## 2022-10-27 RX ADMIN — ACETAMINOPHEN 1000 MG: 500 TABLET ORAL at 06:10

## 2022-10-27 RX ADMIN — MIDAZOLAM HYDROCHLORIDE 2 MG: 1 INJECTION, SOLUTION INTRAMUSCULAR; INTRAVENOUS at 07:10

## 2022-10-27 RX ADMIN — SODIUM CHLORIDE: 0.9 INJECTION, SOLUTION INTRAVENOUS at 07:10

## 2022-10-27 NOTE — BRIEF OP NOTE
[facility] - Surgery ([facility])  Brief Operative Note    Surgery Date: 10/27/2022     Surgeon(s) and Role:     * Chirag Russ MD - Primary     * Nolan Hartman MD - Resident - Assisting     * Karina Beverly MD - Resident - Assisting        Pre-op Diagnosis:  Right ureteral stone [N20.1]  Kidney stones [N20.0]    Post-op Diagnosis:  Post-Op Diagnosis Codes:     * Right ureteral stone [N20.1]     * Kidney stones [N20.0]    Procedure(s) (LRB):  URETEROSCOPY-ANTEGRADE (Right)  NEPHROSTOGRAM, ANTEGRADE (Right)  REMOVAL, CALCULUS, URETER, URETEROSCOPIC (Right)  PYELOSCOPY (Right)  REPLACEMENT, NEPHROSTOMY TUBE (Right)  DILATION, NEPHROSTOMY TRACT (Right)    Anesthesia: General    Operative Findings:   Right proximal ureteral stones basket extracted.  Right nephrostomy tube exchanged.    Estimated Blood Loss: * No values recorded between 10/27/2022  7:37 AM and 10/27/2022  8:25 AM *         Specimens:   Specimen (24h ago, onward)      None              Discharge Note    OUTCOME: Patient tolerated treatment/procedure well without complication and is now ready for discharge.    DISPOSITION: Home or Self Care    FINAL DIAGNOSIS:  <principal problem not specified>    FOLLOWUP: In clinic    DISCHARGE INSTRUCTIONS:  No discharge procedures on file.

## 2022-10-27 NOTE — OP NOTE
[facility]  Operative Note    Date: 10/27/2022    Pre-Op Diagnosis: Right proximal ureteral stones  Patient Active Problem List    Diagnosis Date Noted    Preprocedural cardiovascular examination 10/25/2022    Dyspnea on exertion 10/14/2022    Chest pain 09/13/2021    Urinary tract infection associated with nephrostomy catheter 06/17/2021    Physical deconditioning 04/30/2021    Nephrostomy status     Depression     Moderate episode of recurrent major depressive disorder 04/21/2021    Hard to intubate 04/20/2021    Metabolic acidosis 04/17/2021    QT prolongation 04/16/2021    Presence of urostomy 02/08/2021    History of DVT (deep vein thrombosis) 02/03/2021    Ileostomy in place     Vitamin D deficiency 01/04/2021    Anemia due to chronic blood loss     Moderate malnutrition 09/20/2020    Bilateral ureteral obstruction 09/11/2020    Radiation cystitis 09/04/2020    Abnormal mammogram 08/25/2020    Family history of colon cancer 07/28/2020    Pyelonephritis 06/11/2020    Hydronephrosis 06/11/2020    Anemia due to chronic kidney disease 05/18/2020    ODESSA (acute kidney injury) 03/21/2020    Seizure-like activity 12/02/2018    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 drug 11/14/2018    Generalized anxiety disorder 10/17/2018    PTSD (post-traumatic stress disorder) 10/17/2018    History of cervical cancer 10/11/2018    Metastatic squamous cell carcinoma to lymph node 10/11/2018    Carpal tunnel syndrome on right 07/20/2017    Severe episode of recurrent major depressive disorder, with psychotic features 09/16/2015    Fibromyalgia 09/16/2015    Colon polyp 09/14/2015    Internal hemorrhoids 09/14/2015    Lower extremity pain, diffuse 07/24/2015    Weakness of both lower extremities 07/24/2015    Impaired functional mobility, balance, gait, and endurance 07/24/2015    Cervical cancer 05/04/2015    Osteoarthritis of back 05/04/2015    History of essential hypertension 05/04/2015      Post-Op Diagnosis:  same    Procedure(s) Performed:   1.  Right antegrade ureteroscopy with stone basket extraction  2.  Right nephrostomy tube exchange  3.  Right antegrade nephrostogram  4.  Dilation of nephrostomy tube tract  5.  Fluoro < 1 h    Specimen(s): Right proximal ureteral stone    Staff Surgeon: Chirag Russ MD    Assistant Surgeon: Karina Beverly MD ; Nolan Hartman MD    Anesthesia: General endotracheal anesthesia    Indications: Edita Riley is a 57 y.o. female with a right proximal ureteral stones presenting for definitive management.      Findings:  1.  2 stones (<2cm) encountered in the mid right ureter, basket extracted.   2.  No stones encountered in the kidney, although lower pole calyces unable to be visualized due to access.  3.  Right Nephrostomy tube exchanged.    Estimated Blood Loss: minimal    Drains:   12 Fr right nephrostomy tube  Left Nephrostomy tube (from prior)    Procedure in detail:  After the risks, benefits and possible complications of the procedure were explained, the patient elected to undergo the above procedures and informed consent was obtained. The patient was taken to the OR and placed under anesthesia. Pre-operative antibiotics were administered. Time out was performed.  SCDs were applied and working prior to the procedure.    Our attention was turned to the patient's right nephrostomy tube which was already in place. An antegrade nephrostogram was performed using fluoroscopy to delineate the collecting system. The tube was then cut with scissors. A motion wire was inserted through the nephrostomy tube and advanced to the level of the distal ureter. This was confirmed on fluoroscopy. The nephrostomy tube was then removed keeping the wire in place. A dual lumen access catheter was inserted over the wire. A super stiff wire was then inserted through the second lumen and advanced to the level of the distal ureter. The dual lumen was then removed keeping both wires in  place.    We then dilated the nephrostomy tract under fluoroscopic guidance using a [id number] access sheath which was advanced over the super stiff wire into the renal pelvis.      The flexible ureteroscope was advanced through the access sheath and into the ureter. 2 stones were encountered in the ureter, these were both basket extracted. The flexible scope was inserted all the way to her conduit and the ureter was cleared.     Flexible pyeloscopy was then performed to evaluate all the calyces. Due to access point, we were unable to visualize the lower pole calyces. There were no stone fragments identified at the end of flexible pyeloscopy.      The flexible ureteroscope and access sheath were simultaneously removed, leaving the wire in place.    A 12Fr Nephrostomy tube was placed over the wire with the distal coil in the renal pelvis. This was confirmed with antegrade nephrostogram. Antegrade nephrostogram was performed showing no extravasation of contrast and no filling defects.  The nephrostomy tube was secured in standard fashion. The nephrostomy tube was secured to the skin using a 2-0 nylon.      A sterile compressive dressing was placed. The patient was then transferred back to PACU in stable condition.     Disposition:  The patient will be discharged and follow up for scheduled nephrostomy tube exchanges.    Karina Beverly MD      I have reviewed the operative note performed by Dr. Beverly, and I concur with her/his documentation of Edita Riley. I was present for the critical or key portions of the procedure.    I have reviewed the operative note performed by Dr. Beverly, and I concur with her/his documentation of Edita Riley. I was present for the critical or key portions of the procedure.

## 2022-10-27 NOTE — ANESTHESIA PROCEDURE NOTES
Intubation    Date/Time: 10/27/2022 7:15 AM  Performed by: Cyndee Smyth CRNA  Authorized by: Zenon Hernandez MD     Intubation:     Induction:  Intravenous    Intubated:  Postinduction    Mask Ventilation:  Easy mask    Attempts:  1    Attempted By:  CRNA    Method of Intubation:  Video laryngoscopy    Blade:  Martinez 3    Laryngeal View Grade: Grade I - full view of cords      Difficult Airway Encountered?: No      Complications:  None    Airway Device:  Oral endotracheal tube    Airway Device Size:  7.0    Style/Cuff Inflation:  Cuffed    Tube secured:  23    Secured at:  The lips    Placement Verified By:  Capnometry    Complicating Factors:  Anterior larynx    Findings Post-Intubation:  BS equal bilateral and atraumatic/condition of teeth unchanged

## 2022-10-27 NOTE — DISCHARGE SUMMARY
[facility]  Discharge Note  Short Stay    Admit Date: 10/27/2022    Discharge Date and Time: 10/27/2022 8:55 AM      Attending Physician: Chirag Russ MD     Discharge Provider: Karina Beverly    Diagnoses:  Past Medical History:   Diagnosis Date    Abnormal mammogram 08/25/2020    Acute blood loss anemia     Acute deep vein thrombosis (DVT) of lower extremity 12/09/2020    Advance care planning 04/30/2021    Anemia due to chronic blood loss     Anemia due to chronic kidney disease     Anxiety     Cardiovascular event risk -- low 09/14/2015    ASCVD 10-year risk 1.9% (with optimal risk factors 1.3%) as of 9/14/2015     Cervical cancer 2014    Chronic back pain     Colostomy care     Deep vein thrombosis     Depression     Diarrhea due to malabsorption 11/14/2018    Diarrhea due to malabsorption 11/14/2018    Difficult intubation     Disorder of kidney and ureter     DVT of lower extremity, bilateral 11/04/2020    Fibromyalgia     Fungemia 09/27/2020    Generalized abdominal pain 08/25/2020    GERD (gastroesophageal reflux disease)     Hemifacial spasm 09/16/2015    Hiatal hernia 2014    History of cervical cancer 10/11/2018    Hx of psychiatric care     Cymbalta, trazodone    Hypertension     Hypomagnesemia 11/21/2018    Lactose intolerance     Metastatic squamous cell carcinoma to lymph node 10/11/2018    Nephrostomy tube displaced     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 Bx were negative.     Seizures     Sleep stage dysfunction     Noted on PSG 06/2017; negative for obstructive sleep apnea     Stroke     Urinary tract infection associated with nephrostomy catheter 06/11/2020    Wound infection after surgery 09/24/2020       Discharged Condition:  good    Hospital Course: Patient was admitted for URS and tolerated the procedure well with no complications. The patient was discharged home in good condition on the same day.       Final Diagnoses: Same as principal problem.    Disposition: Home or Self Care    Follow up/Patient Instructions:    Medications:  Reconciled Home Medications:   Current Discharge Medication List        START taking these medications    Details   traMADoL (ULTRAM) 50 mg tablet Take 1 tablet (50 mg total) by mouth every 6 (six) hours.  Qty: 5 tablet, Refills: 0    Comments: Quantity prescribed more than 7 day supply? No           CONTINUE these medications which have NOT CHANGED    Details   acetaminophen (TYLENOL) 325 MG tablet Take 2 tablets (650 mg total) by mouth 3 (three) times daily.  Refills: 0      amoxicillin-clavulanate 875-125mg (AUGMENTIN) 875-125 mg per tablet Take 1 tablet by mouth every 12 (twelve) hours. for 5 days  Qty: 10 tablet, Refills: 0    Associated Diagnoses: Right ureteral stone      gabapentin (NEURONTIN) 100 MG capsule Take 2 capsules (200 mg total) by mouth every evening.  Qty: 90 capsule, Refills: 3      ketorolac (TORADOL) 10 mg tablet Take 1 tablet (10 mg total) by mouth every 6 (six) hours as needed for Pain.  Qty: 30 tablet, Refills: 0    Associated Diagnoses: Urinary tract infection associated with nephrostomy catheter, subsequent encounter; Right ureteral stone      !! loperamide (IMODIUM A-D) 2 mg Tab Take 1 tablet (2 mg total) by mouth 4 (four) times daily as needed (diarrhea).  Qty: 120 tablet, Refills: 0    Associated Diagnoses: Diarrhea, unspecified type      melatonin (MELATIN) 3 mg tablet Take 2 tablets (6 mg total) by mouth nightly as needed for Insomnia.  Qty: 60 tablet, Refills: 0      mirtazapine (REMERON SOL-TAB) 15 MG disintegrating tablet Dissolve 1 tablet (15 mg total) by mouth nightly.  Qty: 30 tablet, Refills: 11      sodium bicarbonate 650 MG tablet Take 2 tablets (1,300 mg total) by  mouth 2 (two) times daily.  Qty: 120 tablet, Refills: 11      sucralfate (CARAFATE) 1 gram tablet Take 1 tablet (1 g total) by mouth 4 (four) times daily before meals and nightly.  Qty: 120 tablet, Refills: 2      !! loperamide (IMODIUM A-D) 2 mg Tab Take 2 tablets (4 mg total) by mouth 4 (four) times daily as needed.  Qty: 240 tablet, Refills: 6    Associated Diagnoses: Attention to ileostomy; High output ileostomy       !! - Potential duplicate medications found. Please discuss with provider.        No discharge procedures on file.   Follow-up Information       Leslie Balbuena MD Follow up in 3 month(s).    Specialty: Urology  Why: Follow up  Contact information:  [address]  [facility] [address] [phone]                             Discharge Procedure Orders (must include Diet, Follow-up, Activity):  No discharge procedures on file.

## 2022-10-27 NOTE — INTERVAL H&P NOTE
The patient has been examined and the H&P has been reviewed:    I concur with the findings and no changes have occurred since H&P was written.    Surgery risks, benefits and alternative options discussed and understood by patient/family.  Re-prescribed empiric abx on Sunday since she had run out of abx. Has been taking them since Sunday in preparation for today's surgery.        There are no hospital problems to display for this patient.

## 2022-10-27 NOTE — TRANSFER OF CARE
"Anesthesia Transfer of Care Note    [facility]    Procedure(s) Performed: Procedure(s) (LRB):  URETEROSCOPY-ANTEGRADE (Right)  NEPHROSTOGRAM, ANTEGRADE (Right)  REMOVAL, CALCULUS, URETER, URETEROSCOPIC (Right)  PYELOSCOPY (Right)  REPLACEMENT, NEPHROSTOMY TUBE (Right)  DILATION, NEPHROSTOMY TRACT (Right)    Patient location: PACU    Anesthesia Type: general    Transport from OR: Transported from OR on 6-10 L/min O2 by face mask with adequate spontaneous ventilation    Post pain: adequate analgesia    Post assessment: no apparent anesthetic complications and tolerated procedure well    Post vital signs: stable    Level of consciousness: responds to stimulation and awake    Nausea/Vomiting: no nausea/vomiting    Complications: none    Transfer of care protocol was followed      Last vitals:   Visit Vitals  /61   Pulse 78   Temp 36.7 °C (98.1 °F)   Resp 16   Ht 5' 9" (1.753 m)   Wt 84.8 kg (187 lb)   LMP 06/08/2014 (Approximate)   SpO2 100%   Breastfeeding No   BMI 27.62 kg/m²     "

## 2022-10-27 NOTE — ANESTHESIA POSTPROCEDURE EVALUATION
Anesthesia Post Evaluation    [facility]    Procedure(s) Performed: Procedure(s) (LRB):  URETEROSCOPY-ANTEGRADE (Right)  NEPHROSTOGRAM, ANTEGRADE (Right)  REMOVAL, CALCULUS, URETER, URETEROSCOPIC (Right)  PYELOSCOPY (Right)  REPLACEMENT, NEPHROSTOMY TUBE (Right)  DILATION, NEPHROSTOMY TRACT (Right)    Final Anesthesia Type: general      Patient location during evaluation: PACU  Patient participation: Yes- Able to Participate  Level of consciousness: awake and alert and oriented  Post-procedure vital signs: reviewed and stable  Pain management: adequate  Airway patency: patent  JENN mitigation strategies: Intraoperative administration of CPAP, nasopharyngeal airway, or oral appliance during sedation, Multimodal analgesia, Extubation and recovery carried out in lateral, semiupright, or other nonsupine position, Verification of full reversal of neuromuscular block and Extubation while patient is awake  PONV status at discharge: No PONV  Anesthetic complications: no      Cardiovascular status: hemodynamically stable  Respiratory status: unassisted  Hydration status: euvolemic  Follow-up not needed.          Vitals Value Taken Time   /60 10/27/22 0945   Temp 36.7 °C (98.1 °F) 10/27/22 0840   Pulse 76 10/27/22 0945   Resp 18 10/27/22 0945   SpO2 100 % 10/27/22 0945         No case tracking events are documented in the log.      Pain/Caitie Score: Pain Rating Prior to Med Admin: 8 (10/27/2022  6:45 AM)  Caitie Score: 10 (10/27/2022  9:00 AM)

## 2022-10-27 NOTE — PLAN OF CARE
Discharge instructions reviewed with pt at bedside. Verbalized understanding. Packet given. Med delivered to bedside.

## 2022-10-27 NOTE — ANESTHESIA PREPROCEDURE EVALUATION
10/27/2022  Pre-operative evaluation for Procedure(s) (LRB):  NEPHROLITHOTOMY, PERCUTANEOUS (Right)  URETEROSCOPY (Right)    Edita Riley is a 57 y.o. female     Patient Active Problem List   Diagnosis    Cervical cancer    Osteoarthritis of back    History of essential hypertension    Lower extremity pain, diffuse    Weakness of both lower extremities    Impaired functional mobility, balance, gait, and endurance    Colon polyp    Internal hemorrhoids    Severe episode of recurrent major depressive disorder, with psychotic features    Fibromyalgia    Carpal tunnel syndrome on right    History of cervical cancer    Metastatic squamous cell carcinoma to lymph node    Generalized anxiety disorder    PTSD (post-traumatic stress disorder)    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 drug    Seizure-like activity    ODESSA (acute kidney injury)    Anemia due to chronic kidney disease    Pyelonephritis    Hydronephrosis    Family history of colon cancer    Abnormal mammogram    Radiation cystitis    Bilateral ureteral obstruction    Moderate malnutrition    Anemia due to chronic blood loss    Vitamin D deficiency    Ileostomy in place    History of DVT (deep vein thrombosis)    Presence of urostomy    QT prolongation    Metabolic acidosis    Hard to intubate    Moderate episode of recurrent major depressive disorder    Nephrostomy status    Depression    Physical deconditioning    Urinary tract infection associated with nephrostomy catheter    Chest pain    Dyspnea on exertion    Preprocedural cardiovascular examination       Review of patient's allergies indicates:   Allergen Reactions    Bee sting [allergen ext-venom-honey bee]      Rash      Grass pollen-bermuda, standard      rash       No current facility-administered medications on file prior to encounter.      Current Outpatient Medications on File Prior to Encounter   Medication Sig Dispense Refill    acetaminophen (TYLENOL) 325 MG tablet Take 2 tablets (650 mg total) by mouth 3 (three) times daily.  0    gabapentin (NEURONTIN) 100 MG capsule Take 2 capsules (200 mg total) by mouth every evening. 90 capsule 3    ketorolac (TORADOL) 10 mg tablet Take 1 tablet (10 mg total) by mouth every 6 (six) hours as needed for Pain. 30 tablet 0    loperamide (IMODIUM A-D) 2 mg Tab Take 1 tablet (2 mg total) by mouth 4 (four) times daily as needed (diarrhea). 120 tablet 0    melatonin (MELATIN) 3 mg tablet Take 2 tablets (6 mg total) by mouth nightly as needed for Insomnia. 60 tablet 0    mirtazapine (REMERON SOL-TAB) 15 MG disintegrating tablet Dissolve 1 tablet (15 mg total) by mouth nightly. 30 tablet 11    sodium bicarbonate 650 MG tablet Take 2 tablets (1,300 mg total) by mouth 2 (two) times daily. 120 tablet 11    sucralfate (CARAFATE) 1 gram tablet Take 1 tablet (1 g total) by mouth 4 (four) times daily before meals and nightly. 120 tablet 2       Past Surgical History:   Procedure Laterality Date    ANTEGRADE NEPHROSTOGRAPHY Bilateral 9/28/2020    Procedure: Nephrostogram - antegrade;  Surgeon: Leslie Balbuena MD;  Location: [facility] OR [address] [place] [address];  Service: Urology;  Laterality: Bilateral;    ANTEGRADE NEPHROSTOGRAPHY Left 4/20/2021    Procedure: NEPHROSTOGRAM, ANTEGRADE;  Surgeon: Leslie Balbuena MD;  Location: [facility] OR [address] [place] [address];  Service: Urology;  Laterality: Left;    BILATERAL OOPHORECTOMY  2015    CHOLECYSTECTOMY  11/09/2016    Done at [facility], path showed chronic cholecystitis and gallstones    cold knife conization  2014    COLECTOMY, RIGHT  9/17/2020    Procedure: COLECTOMY, RIGHT Extended;  Surgeon: Hammad Reynolds MD;  Location: [facility] OR [address] [place] [address];  Service: Colon and Rectal;;    COLONOSCOPY  2014    COLONOSCOPY N/A 10/24/2016    at [facility], Dr Thomas    COLONOSCOPY N/A 5/18/2018    Procedure:  COLONOSCOPY;  Surgeon: Arden Gutiérrez MD;  Location: [facility] (4TH FLR);  Service: Endoscopy;  Laterality: N/A;    COLONOSCOPY N/A 7/28/2020    Procedure: COLONOSCOPY;  Surgeon: Hammad Reynolds MD;  Location: [facility] ENDO (4TH FLR);  Service: Colon and Rectal;  Laterality: N/A;  covid test [place] 7/25    CYSTOSCOPY WITH URETEROSCOPY, RETROGRADE PYELOGRAPHY, AND INSERTION OF STENT Bilateral 3/21/2020    Procedure: CYSTOSCOPY, WITH RETROGRADE PYELOGRAM,;  Surgeon: Leslie Balbuena MD;  Location: [facility] OR 1ST FLR;  Service: Urology;  Laterality: Bilateral;    ESOPHAGOGASTRODUODENOSCOPY  2014    ESOPHAGOGASTRODUODENOSCOPY  11/18/2020    ESOPHAGOGASTRODUODENOSCOPY N/A 11/18/2020    Procedure: ESOPHAGOGASTRODUODENOSCOPY (EGD);  Surgeon: Zenon Spencer MD;  Location: [facility];  Service: Endoscopy;  Laterality: N/A;    ESOPHAGOGASTRODUODENOSCOPY N/A 12/11/2020    Procedure: EGD (ESOPHAGOGASTRODUODENOSCOPY);  Surgeon: Juancho Muse MD;  Location: [facility] (2ND FLR);  Service: Endoscopy;  Laterality: N/A;    HYSTERECTOMY  2014    [facility] for cervical cancer    ILEOSTOMY  9/17/2020    Procedure: CREATION, ILEOSTOMY;  Surgeon: Hammad Reynolds MD;  Location: [facility] OR 2ND FLR;  Service: Colon and Rectal;;    LOOPOGRAM N/A 4/20/2021    Procedure: LOOPOGRAM;  Surgeon: Leslie Balbuena MD;  Location: [facility] OR 1ST FLR;  Service: Urology;  Laterality: N/A;    MOBILIZATION OF SPLENIC FLEXURE N/A 9/11/2020    Procedure: MOBILIZATION, COLONIC;  Surgeon: Hammad Reynolds MD;  Location: [facility] OR 2ND FLR;  Service: Colon and Rectal;  Laterality: N/A;    NEPHROSTOGRAPHY Bilateral 4/17/2021    Procedure: Nephrostogram;  Surgeon: Celeste Surgeon;  Location: [facility];  Service: Anesthesiology;  Laterality: Bilateral;    OOPHORECTOMY      RETROPERITONEAL LYMPHADENECTOMY Right 9/17/2018    Procedure: LYMPHADENECTOMY, RETROPERITONEUM;  Surgeon: Sebas Reed MD;  Location: [facility] OR 2ND FLR;  Service: General;  Laterality:  Right;  ILIAC             CBC: No results for input(s): WBC, RBC, HGB, HCT, PLT, MCV, MCH, MCHC in the last 72 hours.    CMP: No results for input(s): NA, K, CL, CO2, BUN, CREATININE, GLU, MG, PHOS, CALCIUM, ALBUMIN, PROT, ALKPHOS, ALT, AST, BILITOT in the last 72 hours.    INR  No results for input(s): PT, INR, PROTIME, APTT in the last 72 hours.        Diagnostic Studies:      EKD Echo:  No results found for this or any previous visit.        Pre-op Assessment    I have reviewed the Patient Summary Reports.    I have reviewed the NPO Status.   I have reviewed the Medications.     Review of Systems  Anesthesia Hx:  History of prior surgery of interest to airway management or planning: Denies Family Hx of Anesthesia complications.   Denies Personal Hx of Anesthesia complications.       Physical Exam  General: Well nourished, Cooperative, Alert and Oriented    Airway:  Mallampati: III   Mouth Opening: Small, but > 3cm  TM Distance: Normal  Tongue: Normal  Neck ROM: Normal ROM    Dental:  Intact    Chest/Lungs:  Normal Respiratory Rate, Clear to auscultation    Heart:  Rate: Normal  Rhythm: Regular Rhythm        Anesthesia Plan  Type of Anesthesia, risks & benefits discussed:    Anesthesia Type: Gen ETT  Intra-op Monitoring Plan: Standard ASA Monitors  Post Op Pain Control Plan: multimodal analgesia  Induction:  IV  Airway Plan: Video  Informed Consent: Informed consent signed with the Patient and all parties understand the risks and agree with anesthesia plan.  All questions answered.   ASA Score: 3  Day of Surgery Review of History & Physical: H&P Update referred to the surgeon/provider.    Ready For Surgery From Anesthesia Perspective.     .

## 2022-10-27 NOTE — PATIENT INSTRUCTIONS
Post Cystoscopy Instructions  Do not strain to have a bowel movement  No strenuous exercise x 7 days  No driving while you are on narcotic pain medications or if your nino  catheter is in place    You can expect:  To pass stone fragments if you had a stone procedure  Have pain when you void from your stent if you have a stent in place  See blood in your urine if you have a stent in place    If you have a catheter, please return to the ER if your catheter stops draining or you are having abdominal pain.    Call the doctor if:  Temperature is greater than 101F  Persistent vomiting and inability to keep food down  Inability to void if you do not have a catheter

## 2022-10-28 ENCOUNTER — TELEPHONE (OUTPATIENT)
Dept: UROLOGY | Facility: CLINIC | Age: 57
End: 2022-10-28
Payer: MEDICAID

## 2022-10-28 ENCOUNTER — TELEPHONE (OUTPATIENT)
Dept: INTERVENTIONAL RADIOLOGY/VASCULAR | Facility: HOSPITAL | Age: 57
End: 2022-10-28
Payer: MEDICAID

## 2022-10-28 NOTE — TELEPHONE ENCOUNTER
1227-I spoke to pt now and she wanted more Ultram.  I told her that Dr. Russ will usually not prescribe additional Ultram.  Pt states Pain now a 9/10. Took Ultram at 3 AM.  Pt States she is able to still take her toradol and tylenol as ordered on discharge paperwork to control her pain. Educated pt on how to alternate Toradol and tylenol. Pt VU.    ----- Message from Rhina Boyle RN sent at 10/28/2022 12:11 PM CDT -----  Regarding: pain medication  Ashly this is a Dr Russ pt can you direct     Rhina REYES [phone]   ----- Message -----  From: Dipti Yarbrough RN  Sent: 10/28/2022  11:00 AM CDT  To: Rhina Boyle RN      ----- Message -----  From: Addie Mathews NP  Sent: 10/28/2022  10:11 AM CDT  To: Jeb Beard Staff    Daphne hull this pt called me reporting p/o pain. Says she is running low on pain medication and would like a refill. Thanks.

## 2022-10-29 LAB — BACTERIA UR CULT: ABNORMAL

## 2022-10-31 ENCOUNTER — HOSPITAL ENCOUNTER (OUTPATIENT)
Dept: INTERVENTIONAL RADIOLOGY/VASCULAR | Facility: HOSPITAL | Age: 57
Discharge: HOME | End: 2022-10-31
Attending: FAMILY MEDICINE
Payer: MEDICAID

## 2022-10-31 VITALS
HEART RATE: 70 BPM | HEIGHT: 69 IN | BODY MASS INDEX: 27.7 KG/M2 | RESPIRATION RATE: 17 BRPM | OXYGEN SATURATION: 100 % | WEIGHT: 187 LBS | SYSTOLIC BLOOD PRESSURE: 128 MMHG | TEMPERATURE: 98 F | DIASTOLIC BLOOD PRESSURE: 62 MMHG

## 2022-10-31 DIAGNOSIS — N13.5 OBSTRUCTION OF URETER, UNSPECIFIED LATERALITY: ICD-10-CM

## 2022-10-31 PROCEDURE — 99152 MOD SED SAME PHYS/QHP 5/>YRS: CPT | Mod: ,,, | Performed by: STUDENT IN AN ORGANIZED HEALTH CARE EDUCATION/TRAINING PROGRAM

## 2022-10-31 PROCEDURE — 50435 EXCHANGE NEPHROSTOMY CATH: CPT | Mod: 50,,, | Performed by: STUDENT IN AN ORGANIZED HEALTH CARE EDUCATION/TRAINING PROGRAM

## 2022-10-31 PROCEDURE — 25000003 PHARM REV CODE 250: Performed by: STUDENT IN AN ORGANIZED HEALTH CARE EDUCATION/TRAINING PROGRAM

## 2022-10-31 PROCEDURE — 25500020 PHARM REV CODE 255: Performed by: FAMILY MEDICINE

## 2022-10-31 PROCEDURE — 99152 PR MOD CONSCIOUS SEDATION, SAME PHYS, 5+ YRS, FIRST 15 MIN: ICD-10-PCS | Mod: ,,, | Performed by: STUDENT IN AN ORGANIZED HEALTH CARE EDUCATION/TRAINING PROGRAM

## 2022-10-31 PROCEDURE — C1729 CATH, DRAINAGE: HCPCS

## 2022-10-31 PROCEDURE — A4357 BEDSIDE DRAINAGE BAG: HCPCS

## 2022-10-31 PROCEDURE — 63600175 PHARM REV CODE 636 W HCPCS: Performed by: STUDENT IN AN ORGANIZED HEALTH CARE EDUCATION/TRAINING PROGRAM

## 2022-10-31 PROCEDURE — 50435 IR NEPHROSTOMY TUBE CHANGE: ICD-10-PCS | Mod: 50,,, | Performed by: STUDENT IN AN ORGANIZED HEALTH CARE EDUCATION/TRAINING PROGRAM

## 2022-10-31 PROCEDURE — 50435 EXCHANGE NEPHROSTOMY CATH: CPT | Mod: LT

## 2022-10-31 RX ORDER — ONDANSETRON 2 MG/ML
4 INJECTION INTRAMUSCULAR; INTRAVENOUS EVERY 6 HOURS PRN
Status: DISCONTINUED | OUTPATIENT
Start: 2022-10-31 | End: 2022-11-01 | Stop reason: HOSPADM

## 2022-10-31 RX ORDER — FENTANYL CITRATE 50 UG/ML
INJECTION, SOLUTION INTRAMUSCULAR; INTRAVENOUS
Status: COMPLETED | OUTPATIENT
Start: 2022-10-31 | End: 2022-10-31

## 2022-10-31 RX ORDER — CEFAZOLIN SODIUM 1 G/50ML
SOLUTION INTRAVENOUS
Status: COMPLETED | OUTPATIENT
Start: 2022-10-31 | End: 2022-10-31

## 2022-10-31 RX ORDER — SODIUM CHLORIDE 9 MG/ML
INJECTION, SOLUTION INTRAVENOUS CONTINUOUS
Status: DISCONTINUED | OUTPATIENT
Start: 2022-10-31 | End: 2022-11-01 | Stop reason: HOSPADM

## 2022-10-31 RX ORDER — LIDOCAINE HYDROCHLORIDE 10 MG/ML
1 INJECTION, SOLUTION EPIDURAL; INFILTRATION; INTRACAUDAL; PERINEURAL ONCE
Status: DISCONTINUED | OUTPATIENT
Start: 2022-10-31 | End: 2022-11-01 | Stop reason: HOSPADM

## 2022-10-31 RX ORDER — LIDOCAINE HYDROCHLORIDE 10 MG/ML
INJECTION INFILTRATION; PERINEURAL
Status: COMPLETED | OUTPATIENT
Start: 2022-10-31 | End: 2022-10-31

## 2022-10-31 RX ORDER — MIDAZOLAM HYDROCHLORIDE 1 MG/ML
INJECTION INTRAMUSCULAR; INTRAVENOUS
Status: COMPLETED | OUTPATIENT
Start: 2022-10-31 | End: 2022-10-31

## 2022-10-31 RX ADMIN — CEFAZOLIN SODIUM 1 G: 1 SOLUTION INTRAVENOUS at 11:10

## 2022-10-31 RX ADMIN — IOHEXOL 15 ML: 300 INJECTION, SOLUTION INTRAVENOUS at 11:10

## 2022-10-31 RX ADMIN — LIDOCAINE HYDROCHLORIDE 5 ML: 10 INJECTION, SOLUTION INFILTRATION; PERINEURAL at 11:10

## 2022-10-31 RX ADMIN — FENTANYL CITRATE 25 MCG: 50 INJECTION, SOLUTION INTRAMUSCULAR; INTRAVENOUS at 11:10

## 2022-10-31 RX ADMIN — FENTANYL CITRATE 50 MCG: 50 INJECTION, SOLUTION INTRAMUSCULAR; INTRAVENOUS at 11:10

## 2022-10-31 RX ADMIN — MIDAZOLAM HYDROCHLORIDE 2 MG: 1 INJECTION INTRAMUSCULAR; INTRAVENOUS at 11:10

## 2022-10-31 NOTE — PROCEDURES
"  Pre Op Diagnosis: Ureteral obstruction  Post Op Diagnosis: Same    Procedure: Bilateral nephrostomy tube exchnage    Procedure performed by: Silva    Written Informed Consent Obtained: Yes  Specimen Removed: NO  Estimated Blood Loss: Minimal    Findings:   Successful exchange of bilateral nephrostomy tubes. Large calcium deposits noted on left side. Recommend exchnage q 6 weeks.     Patient tolerated procedure well.    Refugio Ascencio MD ([facility])  Interventional Radiology  [phone]      "

## 2022-10-31 NOTE — PLAN OF CARE
Bilateral nephrostomy tube exchange procedure completed. Patient tolerated well. Patient AAOx3, no distress noted, respirations even and unlabored, will continue to monitor. VSS. Right and Left procedure site clean, dry, and intact; no bleeding or hematoma noted. Patient to be transferred to MPU for 1 hour for post-procedural recovery per MD. Report to be given at bedside to RN.

## 2022-10-31 NOTE — PLAN OF CARE
Dc'd to home via wheelchair w/ transporter, dc instruction in hand & reviewed w/ Pt. Questions answered, voiced understanding.

## 2022-10-31 NOTE — PLAN OF CARE
Patient AAOx3, no distress noted, respirations even and unlabored, will continue to monitor. VSS. Acceptance of education, consents signed, H/P done. Labs reviewed. Patient in IR Room 189 for nephrostomy tube exchange procedure. Warm blankets applied to patient. Patient prepped and draped in sterile fashion.

## 2022-10-31 NOTE — DISCHARGE INSTRUCTIONS
Please call with any questions or concerns.      Monday thru Friday 8:00 am - 4:30 pm    Interventional Radiology   [phone]    After Hours    Ask for the Radiology Intern on call  [phone]

## 2022-10-31 NOTE — DISCHARGE SUMMARY
"Radiology Discharge Summary      Hospital Course: No complications    Admit Date: 10/31/2022  Discharge Date: 10/31/2022     Instructions Given to Patient: Yes  Diet: Resume prior diet  Activity: activity as tolerated and no driving for today    Description of Condition on Discharge: Stable  Vital Signs (Most Recent): Temp: 98.6 °F (37 °C) (10/31/22 0950)  Pulse: 72 (10/31/22 0950)  Resp: 18 (10/31/22 0950)  BP: 136/69 (10/31/22 1007)  SpO2: 100 % (10/31/22 0950)    Discharge Disposition: Home    Discharge Diagnosis: ureteral obstruction     Follow-up: repeat exchange in 6 weeks    Refugio Ascencio MD ([facility])  Interventional Radiology  [phone]      "

## 2022-10-31 NOTE — H&P
Radiology History & Physical      SUBJECTIVE:     History of Present Illness:  Edita Riley is a 57 y.o. female who presents for nephrostomy tube evaluation and possible exchange.    Past Medical History:   Diagnosis Date    Abnormal mammogram 08/25/2020    Acute blood loss anemia     Acute deep vein thrombosis (DVT) of lower extremity 12/09/2020    Advance care planning 04/30/2021    Anemia due to chronic blood loss     Anemia due to chronic kidney disease     Anxiety     Cardiovascular event risk -- low 09/14/2015    ASCVD 10-year risk 1.9% (with optimal risk factors 1.3%) as of 9/14/2015     Cervical cancer 2014    Chronic back pain     Colostomy care     Deep vein thrombosis     Depression     Diarrhea due to malabsorption 11/14/2018    Diarrhea due to malabsorption 11/14/2018    Difficult intubation     Disorder of kidney and ureter     DVT of lower extremity, bilateral 11/04/2020    Fibromyalgia     Fungemia 09/27/2020    Generalized abdominal pain 08/25/2020    GERD (gastroesophageal reflux disease)     Hemifacial spasm 09/16/2015    Hiatal hernia 2014    History of cervical cancer 10/11/2018    Hx of psychiatric care     Cymbalta, trazodone    Hypertension     Hypomagnesemia 11/21/2018    Lactose intolerance     Metastatic squamous cell carcinoma to lymph node 10/11/2018    Nephrostomy tube displaced     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 drug 11/14/2018    Osteoarthritis of back     Peritonitis 09/22/2020    Pseudomonas urinary tract infection 04/21/2021    Psychiatric problem     Refusal of blood transfusions as patient is [facility]     Schatzki's ring 09/14/2015    Seen on outside EGD 05/2014, underwent esophageal dilatation. Bx were negative.     Seizures     Sleep stage dysfunction     Noted on PSG 06/2017; negative for obstructive sleep apnea     Stroke     Urinary tract infection associated with nephrostomy catheter 06/11/2020    Wound infection after surgery 09/24/2020     Past  Surgical History:   Procedure Laterality Date    ANTEGRADE NEPHROSTOGRAPHY Bilateral 9/28/2020    Procedure: Nephrostogram - antegrade;  Surgeon: Leslie Balbuena MD;  Location: [facility] OR 1ST FLR;  Service: Urology;  Laterality: Bilateral;    ANTEGRADE NEPHROSTOGRAPHY Left 4/20/2021    Procedure: NEPHROSTOGRAM, ANTEGRADE;  Surgeon: Leslie Balbuena MD;  Location: [facility] OR 1ST FLR;  Service: Urology;  Laterality: Left;    ANTEGRADE NEPHROSTOGRAPHY Right 10/27/2022    Procedure: NEPHROSTOGRAM, ANTEGRADE;  Surgeon: Chirag Russ MD;  Location: [facility] OR 1ST FLR;  Service: Urology;  Laterality: Right;    BILATERAL OOPHORECTOMY  2015    CHOLECYSTECTOMY  11/09/2016    Done at [facility], path showed chronic cholecystitis and gallstones    cold knife conization  2014    COLECTOMY, RIGHT  9/17/2020    Procedure: COLECTOMY, RIGHT Extended;  Surgeon: Hamamd Reynolds MD;  Location: [facility] OR 2ND FLR;  Service: Colon and Rectal;;    COLONOSCOPY  2014    COLONOSCOPY N/A 10/24/2016    at [facility], Dr Gutiérrez    COLONOSCOPY N/A 5/18/2018    Procedure: COLONOSCOPY;  Surgeon: Arden Gutiérrez MD;  Location: [facility] ENDO (4TH FLR);  Service: Endoscopy;  Laterality: N/A;    COLONOSCOPY N/A 7/28/2020    Procedure: COLONOSCOPY;  Surgeon: Hammad Reynolds MD;  Location: [facility] ENDO (4TH FLR);  Service: Colon and Rectal;  Laterality: N/A;  covid test [place] 7/25    CYSTOSCOPY WITH URETEROSCOPY, RETROGRADE PYELOGRAPHY, AND INSERTION OF STENT Bilateral 3/21/2020    Procedure: CYSTOSCOPY, WITH RETROGRADE PYELOGRAM,;  Surgeon: Leslie Balbuena MD;  Location: [facility] OR 1ST FLR;  Service: Urology;  Laterality: Bilateral;    DILATION OF NEPHROSTOMY TRACT Right 10/27/2022    Procedure: DILATION, NEPHROSTOMY TRACT;  Surgeon: Chirag Russ MD;  Location: [facility] OR 1ST FLR;  Service: Urology;  Laterality: Right;    ESOPHAGOGASTRODUODENOSCOPY  2014    ESOPHAGOGASTRODUODENOSCOPY  11/18/2020    ESOPHAGOGASTRODUODENOSCOPY N/A 11/18/2020    Procedure:  ESOPHAGOGASTRODUODENOSCOPY (EGD);  Surgeon: Zenon Spencer MD;  Location: [facility] ENDO;  Service: Endoscopy;  Laterality: N/A;    ESOPHAGOGASTRODUODENOSCOPY N/A 12/11/2020    Procedure: EGD (ESOPHAGOGASTRODUODENOSCOPY);  Surgeon: Juancho Muse MD;  Location: [facility] (2ND FLR);  Service: Endoscopy;  Laterality: N/A;    HYSTERECTOMY  2014    [facility] for cervical cancer    ILEOSTOMY  9/17/2020    Procedure: CREATION, ILEOSTOMY;  Surgeon: Hammad Reynolds MD;  Location: [facility] OR 2ND FLR;  Service: Colon and Rectal;;    LOOPOGRAM N/A 4/20/2021    Procedure: LOOPOGRAM;  Surgeon: Leslie Balbuena MD;  Location: [facility] OR 1ST FLR;  Service: Urology;  Laterality: N/A;    MOBILIZATION OF SPLENIC FLEXURE N/A 9/11/2020    Procedure: MOBILIZATION, COLONIC;  Surgeon: Hammad Reynolds MD;  Location: [facility] OR 2ND FLR;  Service: Colon and Rectal;  Laterality: N/A;    NEPHROSTOGRAPHY Bilateral 4/17/2021    Procedure: Nephrostogram;  Surgeon: Celeste Surgeon;  Location: [facility];  Service: Anesthesiology;  Laterality: Bilateral;    OOPHORECTOMY      PYELOSCOPY Right 10/27/2022    Procedure: PYELOSCOPY;  Surgeon: Chirag Russ MD;  Location: [facility] OR 1ST FLR;  Service: Urology;  Laterality: Right;    REPLACEMENT OF NEPHROSTOMY TUBE Right 10/27/2022    Procedure: REPLACEMENT, NEPHROSTOMY TUBE;  Surgeon: Chirag Russ MD;  Location: [facility] OR 1ST FLR;  Service: Urology;  Laterality: Right;    RETROPERITONEAL LYMPHADENECTOMY Right 9/17/2018    Procedure: LYMPHADENECTOMY, RETROPERITONEUM;  Surgeon: Sebas Reed MD;  Location: [facility] OR 2ND FLR;  Service: General;  Laterality: Right;  ILIAC    URETEROSCOPIC REMOVAL OF URETERIC CALCULUS Right 10/27/2022    Procedure: REMOVAL, CALCULUS, URETER, URETEROSCOPIC;  Surgeon: Chirag Russ MD;  Location: [facility] OR 1ST FLR;  Service: Urology;  Laterality: Right;    URETEROSCOPY Right 10/27/2022    Procedure: URETEROSCOPY-ANTEGRADE;  Surgeon: Chirag Russ MD;  Location: [facility] OR [facility]  FLR;  Service: Urology;  Laterality: Right;       Home Meds:   Prior to Admission medications    Medication Sig Start Date End Date Taking? Authorizing Provider   acetaminophen (TYLENOL) 325 MG tablet Take 2 tablets (650 mg total) by mouth 3 (three) times daily. 9/1/22  Yes Danuta Barboza MD   gabapentin (NEURONTIN) 100 MG capsule Take 2 capsules (200 mg total) by mouth every evening. 9/1/22  Yes Danuta Barboza MD   ketorolac (TORADOL) 10 mg tablet Take 1 tablet (10 mg total) by mouth every 6 (six) hours as needed for Pain. 9/8/22  Yes Denise Brooks NP   loperamide (IMODIUM A-D) 2 mg Tab Take 1 tablet (2 mg total) by mouth 4 (four) times daily as needed (diarrhea). 9/22/22 12/21/22 Yes Addie Mathews NP   melatonin (MELATIN) 3 mg tablet Take 2 tablets (6 mg total) by mouth nightly as needed for Insomnia. 9/6/21  Yes Kacey Bergeron MD   mirtazapine (REMERON SOL-TAB) 15 MG disintegrating tablet Dissolve 1 tablet (15 mg total) by mouth nightly. 2/15/22 2/15/23 Yes Diony Ram MD   sodium bicarbonate 650 MG tablet Take 2 tablets (1,300 mg total) by mouth 2 (two) times daily. 2/15/22 2/15/23 Yes Diony Ram MD   sucralfate (CARAFATE) 1 gram tablet Take 1 tablet (1 g total) by mouth 4 (four) times daily before meals and nightly. 9/1/22  Yes Danuta Barboza MD   traMADoL (ULTRAM) 50 mg tablet Take 1 tablet (50 mg total) by mouth every 6 (six) hours. 10/27/22  Yes Karina Beverly MD   loperamide (IMODIUM A-D) 2 mg Tab Take 2 tablets (4 mg total) by mouth 4 (four) times daily as needed. 10/26/22 11/25/22  Paulina Gilbert, CNS     Anticoagulants/Antiplatelets: no anticoagulation    Allergies:   Review of patient's allergies indicates:   Allergen Reactions    Bee sting [allergen ext-venom-honey bee]      Rash      Grass pollen-bermuda, standard      rash     Sedation History:  no adverse reactions    Review of Systems:   Hematological: no known coagulopathies  Respiratory: no shortness of  breath  Cardiovascular: no chest pain  Gastrointestinal: no abdominal pain  Genito-Urinary: no dysuria  Musculoskeletal: negative  Neurological: no TIA or stroke symptoms         OBJECTIVE:     Vital Signs (Most Recent)  Temp: 98.6 °F (37 °C) (10/31/22 0950)  Pulse: 72 (10/31/22 0950)  Resp: 18 (10/31/22 0950)  BP: 136/69 (10/31/22 1007)  SpO2: 100 % (10/31/22 0950)    Physical Exam:  ASA: II  Mallampati: II    General: no acute distress  Mental Status: alert and oriented to person, place and time  HEENT: normocephalic, atraumatic  Chest: unlabored breathing  Heart: regular heart rate  Abdomen: nondistended  Extremity: moves all extremities    Laboratory  Lab Results   Component Value Date    INR 0.9 10/31/2022       Lab Results   Component Value Date    WBC 9.25 10/23/2022    HGB 11.9 (L) 10/23/2022    HCT 40.9 10/23/2022    MCV 94 10/23/2022     10/23/2022      Lab Results   Component Value Date    GLU 86 10/23/2022     10/23/2022    K 4.1 10/23/2022     (H) 10/23/2022    CO2 17 (L) 10/23/2022    BUN 17 10/23/2022    CREATININE 1.5 (H) 10/23/2022    CALCIUM 9.4 10/23/2022    MG 1.9 09/01/2022    ALT 11 10/23/2022    AST 21 10/23/2022    ALBUMIN 3.3 (L) 10/23/2022    BILITOT 0.2 10/23/2022    BILIDIR 0.2 12/04/2020       ASSESSMENT/PLAN:     Sedation Plan: Moderate  Patient will undergo nephrostomy tube evaluation and possible exchange.    Tony Andujar MD PGY-2  Department of Radiology  [facility]

## 2022-11-01 NOTE — PROGRESS NOTES
"Physical Therapy Progress Note    Evaluation Date: 11/3/17  Visit # authorized: 1/13  Authorization period: 12/31/17  Plan of care Expiration: 1/12/17  Time In: 3  Time Out: 4  55 min. 1:1    Subjective  Edita reports increased back, sides, and feet pain. Notes some relief of knees in the pool, but not back and feet. Notes compliance with HEP.    Objective     Treatment: Edita was instructed in and performed therapeutic exercises to develop strength, endurance, ROM, flexibility, balance, posture and core stabilization for 55 minutes, progressing with reps on this date.   warm-up:  · recumbent bike: no resistance 10'  Supine  · piriformis stretch: 3x30 sec. bilateral  · modified push/pull: 3x5"  · sciatic nerve glide: 3x10  · TA brace: 2x8  · TA brace with hip adduction with ball squeeze: 2x8  · TA brace with alternating march: 2x8    Assessment  Patient had improved endurance/exercise tolerance today's visit. Pt. reports compliance with HEP.Will continue to progress as casandra.    This is a 52 y.o. female referred to outpatient physical therapy who presents with a medical diagnosis of fibromyalgia and PT diagnosis of weakness and difficulty walking demonstrating joint dysfunction and functional limitation as described above. Level of complexity is moderate difficulty;  based on patient's past medical history including the above co-morbidities and personal factors; functional limitations, and clinical presentation directly impacting his/her plan of care.  Patient will continue to benefit from skilled PT intervention.    Edita is making good progress towards established goals.      Plan  Continue 2x per week.  " AROM

## 2022-11-02 DIAGNOSIS — N13.30 HYDRONEPHROSIS, UNSPECIFIED HYDRONEPHROSIS TYPE: ICD-10-CM

## 2022-11-02 DIAGNOSIS — N13.5 OBSTRUCTION OF URETER, UNSPECIFIED LATERALITY: Primary | ICD-10-CM

## 2022-11-02 LAB
COMPN STONE: NORMAL
SPECIMEN SOURCE: NORMAL
STONE ANALYSIS IR-IMP: NORMAL

## 2022-11-28 PROBLEM — N12 PYELONEPHRITIS: Chronic | Status: RESOLVED | Noted: 2020-06-11 | Resolved: 2022-11-28

## 2022-12-02 ENCOUNTER — HOSPITAL ENCOUNTER (EMERGENCY)
Facility: HOSPITAL | Age: 57
Discharge: HOME | End: 2022-12-02
Attending: EMERGENCY MEDICINE
Payer: MEDICAID

## 2022-12-02 ENCOUNTER — OFFICE VISIT (OUTPATIENT)
Dept: WOUND CARE | Facility: CLINIC | Age: 57
End: 2022-12-02
Payer: MEDICAID

## 2022-12-02 VITALS
SYSTOLIC BLOOD PRESSURE: 124 MMHG | HEART RATE: 92 BPM | TEMPERATURE: 98 F | RESPIRATION RATE: 16 BRPM | OXYGEN SATURATION: 97 % | BODY MASS INDEX: 27.62 KG/M2 | WEIGHT: 187 LBS | DIASTOLIC BLOOD PRESSURE: 73 MMHG

## 2022-12-02 DIAGNOSIS — R19.8 HIGH OUTPUT ILEOSTOMY: ICD-10-CM

## 2022-12-02 DIAGNOSIS — L24.9 IRRITANT CONTACT DERMATITIS DUE TO ILEOSTOMY: ICD-10-CM

## 2022-12-02 DIAGNOSIS — M54.9 BACK PAIN, UNSPECIFIED BACK LOCATION, UNSPECIFIED BACK PAIN LATERALITY, UNSPECIFIED CHRONICITY: ICD-10-CM

## 2022-12-02 DIAGNOSIS — K94.19 IRRITANT CONTACT DERMATITIS DUE TO ILEOSTOMY: ICD-10-CM

## 2022-12-02 DIAGNOSIS — Z43.2 ATTENTION TO ILEOSTOMY: Primary | ICD-10-CM

## 2022-12-02 DIAGNOSIS — R10.9 ABDOMINAL PAIN, UNSPECIFIED ABDOMINAL LOCATION: Primary | ICD-10-CM

## 2022-12-02 DIAGNOSIS — Z93.2 HIGH OUTPUT ILEOSTOMY: ICD-10-CM

## 2022-12-02 DIAGNOSIS — Z43.6 ATTENTION TO UROSTOMY: ICD-10-CM

## 2022-12-02 LAB
ALBUMIN SERPL BCP-MCNC: 3.5 G/DL (ref 3.5–5.2)
ALP SERPL-CCNC: 151 U/L (ref 55–135)
ALT SERPL W/O P-5'-P-CCNC: 20 U/L (ref 10–44)
ANION GAP SERPL CALC-SCNC: 9 MMOL/L (ref 8–16)
AST SERPL-CCNC: 25 U/L (ref 10–40)
BACTERIA #/AREA URNS AUTO: ABNORMAL /HPF
BASOPHILS # BLD AUTO: 0.04 K/UL (ref 0–0.2)
BASOPHILS NFR BLD: 0.4 % (ref 0–1.9)
BILIRUB SERPL-MCNC: 0.3 MG/DL (ref 0.1–1)
BILIRUB UR QL STRIP: NEGATIVE
BILIRUB UR QL STRIP: NEGATIVE
BUN SERPL-MCNC: 22 MG/DL (ref 6–20)
CALCIUM SERPL-MCNC: 9.4 MG/DL (ref 8.7–10.5)
CHLORIDE SERPL-SCNC: 111 MMOL/L (ref 95–110)
CLARITY UR REFRACT.AUTO: ABNORMAL
CLARITY UR REFRACT.AUTO: ABNORMAL
CO2 SERPL-SCNC: 20 MMOL/L (ref 23–29)
COLOR UR AUTO: YELLOW
COLOR UR AUTO: YELLOW
CREAT SERPL-MCNC: 1.8 MG/DL (ref 0.5–1.4)
DIFFERENTIAL METHOD: ABNORMAL
EOSINOPHIL # BLD AUTO: 0.3 K/UL (ref 0–0.5)
EOSINOPHIL NFR BLD: 2.6 % (ref 0–8)
ERYTHROCYTE [DISTWIDTH] IN BLOOD BY AUTOMATED COUNT: 14.8 % (ref 11.5–14.5)
EST. GFR  (NO RACE VARIABLE): 32.1 ML/MIN/1.73 M^2
GLUCOSE SERPL-MCNC: 93 MG/DL (ref 70–110)
GLUCOSE UR QL STRIP: NEGATIVE
GLUCOSE UR QL STRIP: NEGATIVE
HCT VFR BLD AUTO: 43.9 % (ref 37–48.5)
HGB BLD-MCNC: 13 G/DL (ref 12–16)
HGB UR QL STRIP: ABNORMAL
HGB UR QL STRIP: ABNORMAL
HYALINE CASTS UR QL AUTO: 0 /LPF
IMM GRANULOCYTES # BLD AUTO: 0.03 K/UL (ref 0–0.04)
IMM GRANULOCYTES NFR BLD AUTO: 0.3 % (ref 0–0.5)
KETONES UR QL STRIP: NEGATIVE
KETONES UR QL STRIP: NEGATIVE
LACTATE SERPL-SCNC: 1.1 MMOL/L (ref 0.5–2.2)
LEUKOCYTE ESTERASE UR QL STRIP: ABNORMAL
LEUKOCYTE ESTERASE UR QL STRIP: ABNORMAL
LYMPHOCYTES # BLD AUTO: 1.5 K/UL (ref 1–4.8)
LYMPHOCYTES NFR BLD: 15.3 % (ref 18–48)
MCH RBC QN AUTO: 26.8 PG (ref 27–31)
MCHC RBC AUTO-ENTMCNC: 29.6 G/DL (ref 32–36)
MCV RBC AUTO: 91 FL (ref 82–98)
MICROSCOPIC COMMENT: ABNORMAL
MONOCYTES # BLD AUTO: 1.1 K/UL (ref 0.3–1)
MONOCYTES NFR BLD: 11.3 % (ref 4–15)
NEUTROPHILS # BLD AUTO: 7 K/UL (ref 1.8–7.7)
NEUTROPHILS NFR BLD: 70.1 % (ref 38–73)
NITRITE UR QL STRIP: NEGATIVE
NITRITE UR QL STRIP: NEGATIVE
NRBC BLD-RTO: 0 /100 WBC
PH UR STRIP: 6 [PH] (ref 5–8)
PH UR STRIP: 6 [PH] (ref 5–8)
PLATELET # BLD AUTO: 220 K/UL (ref 150–450)
PMV BLD AUTO: 11.3 FL (ref 9.2–12.9)
POTASSIUM SERPL-SCNC: 5 MMOL/L (ref 3.5–5.1)
PROT SERPL-MCNC: 8.3 G/DL (ref 6–8.4)
PROT UR QL STRIP: ABNORMAL
PROT UR QL STRIP: ABNORMAL
RBC # BLD AUTO: 4.85 M/UL (ref 4–5.4)
RBC #/AREA URNS AUTO: 94 /HPF (ref 0–4)
SODIUM SERPL-SCNC: 140 MMOL/L (ref 136–145)
SP GR UR STRIP: 1.02 (ref 1–1.03)
SP GR UR STRIP: 1.02 (ref 1–1.03)
URATE CRY UR QL COMP ASSIST: ABNORMAL
URN SPEC COLLECT METH UR: ABNORMAL
URN SPEC COLLECT METH UR: ABNORMAL
WBC # BLD AUTO: 9.96 K/UL (ref 3.9–12.7)
WBC #/AREA URNS AUTO: >100 /HPF (ref 0–5)
WBC CLUMPS UR QL AUTO: ABNORMAL

## 2022-12-02 PROCEDURE — 1159F MED LIST DOCD IN RCRD: CPT | Mod: CPTII,,, | Performed by: CLINICAL NURSE SPECIALIST

## 2022-12-02 PROCEDURE — 99999 PR PBB SHADOW E&M-EST. PATIENT-LVL II: ICD-10-PCS | Mod: PBBFAC,,, | Performed by: CLINICAL NURSE SPECIALIST

## 2022-12-02 PROCEDURE — 96375 TX/PRO/DX INJ NEW DRUG ADDON: CPT

## 2022-12-02 PROCEDURE — 1160F PR REVIEW ALL MEDS BY PRESCRIBER/CLIN PHARMACIST DOCUMENTED: ICD-10-PCS | Mod: CPTII,,, | Performed by: CLINICAL NURSE SPECIALIST

## 2022-12-02 PROCEDURE — 25000003 PHARM REV CODE 250: Performed by: PHYSICIAN ASSISTANT

## 2022-12-02 PROCEDURE — 99215 OFFICE O/P EST HI 40 MIN: CPT | Mod: S$PBB,,, | Performed by: CLINICAL NURSE SPECIALIST

## 2022-12-02 PROCEDURE — 99285 PR EMERGENCY DEPT VISIT,LEVEL V: ICD-10-PCS | Mod: ,,, | Performed by: EMERGENCY MEDICINE

## 2022-12-02 PROCEDURE — 25500020 PHARM REV CODE 255: Performed by: EMERGENCY MEDICINE

## 2022-12-02 PROCEDURE — 99285 EMERGENCY DEPT VISIT HI MDM: CPT | Mod: ,,, | Performed by: EMERGENCY MEDICINE

## 2022-12-02 PROCEDURE — 96374 THER/PROPH/DIAG INJ IV PUSH: CPT

## 2022-12-02 PROCEDURE — 99285 EMERGENCY DEPT VISIT HI MDM: CPT | Mod: 25,27

## 2022-12-02 PROCEDURE — 83605 ASSAY OF LACTIC ACID: CPT | Performed by: PHYSICIAN ASSISTANT

## 2022-12-02 PROCEDURE — 80053 COMPREHEN METABOLIC PANEL: CPT | Performed by: PHYSICIAN ASSISTANT

## 2022-12-02 PROCEDURE — 81001 URINALYSIS AUTO W/SCOPE: CPT | Performed by: PHYSICIAN ASSISTANT

## 2022-12-02 PROCEDURE — 63600175 PHARM REV CODE 636 W HCPCS: Performed by: PHYSICIAN ASSISTANT

## 2022-12-02 PROCEDURE — 99215 PR OFFICE/OUTPT VISIT, EST, LEVL V, 40-54 MIN: ICD-10-PCS | Mod: S$PBB,,, | Performed by: CLINICAL NURSE SPECIALIST

## 2022-12-02 PROCEDURE — 85025 COMPLETE CBC W/AUTO DIFF WBC: CPT | Performed by: PHYSICIAN ASSISTANT

## 2022-12-02 PROCEDURE — 1160F RVW MEDS BY RX/DR IN RCRD: CPT | Mod: CPTII,,, | Performed by: CLINICAL NURSE SPECIALIST

## 2022-12-02 PROCEDURE — 96361 HYDRATE IV INFUSION ADD-ON: CPT

## 2022-12-02 PROCEDURE — 99212 OFFICE O/P EST SF 10 MIN: CPT | Mod: PBBFAC,25 | Performed by: CLINICAL NURSE SPECIALIST

## 2022-12-02 PROCEDURE — 99999 PR PBB SHADOW E&M-EST. PATIENT-LVL II: CPT | Mod: PBBFAC,,, | Performed by: CLINICAL NURSE SPECIALIST

## 2022-12-02 PROCEDURE — 1159F PR MEDICATION LIST DOCUMENTED IN MEDICAL RECORD: ICD-10-PCS | Mod: CPTII,,, | Performed by: CLINICAL NURSE SPECIALIST

## 2022-12-02 RX ORDER — CEFDINIR 300 MG/1
300 CAPSULE ORAL 2 TIMES DAILY
Qty: 20 CAPSULE | Refills: 0 | Status: SHIPPED | OUTPATIENT
Start: 2022-12-02 | End: 2022-12-12

## 2022-12-02 RX ORDER — MORPHINE SULFATE 4 MG/ML
4 INJECTION, SOLUTION INTRAMUSCULAR; INTRAVENOUS
Status: COMPLETED | OUTPATIENT
Start: 2022-12-02 | End: 2022-12-02

## 2022-12-02 RX ORDER — ONDANSETRON 2 MG/ML
4 INJECTION INTRAMUSCULAR; INTRAVENOUS
Status: COMPLETED | OUTPATIENT
Start: 2022-12-02 | End: 2022-12-02

## 2022-12-02 RX ADMIN — SODIUM CHLORIDE 1000 ML: 0.9 INJECTION, SOLUTION INTRAVENOUS at 07:12

## 2022-12-02 RX ADMIN — IOHEXOL 75 ML: 350 INJECTION, SOLUTION INTRAVENOUS at 07:12

## 2022-12-02 RX ADMIN — ONDANSETRON 4 MG: 2 INJECTION INTRAMUSCULAR; INTRAVENOUS at 06:12

## 2022-12-02 RX ADMIN — MORPHINE SULFATE 4 MG: 4 INJECTION INTRAVENOUS at 06:12

## 2022-12-02 NOTE — PROGRESS NOTES
Subjective:       Patient ID: Edita Riley is a 57 y.o. female.    Chief Complaint: Ileostomy, Skin Problem, and Urostomy    Pt was dropped off at ED for pouches leaking and out of supplies, and her skin burning badly so chief complaint abd pain, but her skin is scalded and this is exquisitely painful. I asked they bring her to my clinic where I have supplies to fix situation and Cl BROOKE did so in wheelchair.  Then I returned her to the ED     October visit with    visit one year after first meeting and today she is accompanied by her new  Carmen Sanchez,    she has an ileostomy and urostomy since 2020 . She is still having issues with her pouches leaking and she is making many visits to ED for supplies and having leaks daily.  Her  did not want to come and per pt he is trying to get her to go to a nursing home , as he is not really willing to help her with all her health issues.   She is having kidney stone surgery tomorrow with Dr Russ  and she also has bilateral nephrostomy tubes now as well set for exchange in IR next week       Review of Systems   Constitutional:  Negative for activity change and fever.   HENT: Negative.     Respiratory: Negative.     Cardiovascular: Negative.    Gastrointestinal:         Ileostomy leaks every day and supply issues    Genitourinary:         Bilateral neph tubes and urine bit cloudy    Integumentary:  Positive for rash. Negative for wound.       Objective:      Physical Exam  Constitutional:       Appearance: Normal appearance.   Pulmonary:      Effort: Pulmonary effort is normal. No respiratory distress.   Abdominal:      General: Abdomen is flat. Bowel sounds are normal.      Palpations: Abdomen is soft.   Musculoskeletal:         General: Normal range of motion.   Skin:     Findings: Rash present.         Spouse dropped her off at ED for this,  she has stool all over her cloths, she says it started during night and she cannot get  "pch to stay and ran out this week, she has order coming soon  She is quite scalded and so I did what worked last time and hope it will again  this is a bit worse than last visit in Oct     Applied the 3M Cavilon AdvaNCED TO SKIN    Then applied ring around stoma to caulk   1" soft convex pch [place] and belted snuggly she understands she must empty early and not let it get full     Changed her uro pouch too,     Changed dressings on both neph tubes, they were gross, same ones from placement and hanging off her     ______________________________________________________________________________________________________________________________________  2021       2022     2021      Oct 2022  Ileostomy right SIDE STOMA IS  SLIGHTLY OVAL AT 3 OCLOCK SIDE AND PT MUST ROUND OUT HER STOMA FOR POUCH PLACING   GAVE HER HOLLISER NEW IMAGE PRECUT 1" TODAY TO TRY  AND DELT       2021       Oct 2022  GAVE HER CONVEX COLOPLAST TODAY (10)   She is having trouble with 180 and not getting adequate supplies and is very unhappy .    I WILL SWITCH HER TO [facility] TODAY FOR SUPPLIES GOING FORWARD    Assessment:       1. Attention to ileostomy    2. Irritant contact dermatitis due to ileostomy    3. Attention to urostomy    4. High output ileostomy        Plan:     Notified her   she is here and was seen, and situation is still not good.  Changed all pouches and dressings and cleaned her up    I spent over 50% of this 40 minute visit in face to face counseling.       "

## 2022-12-02 NOTE — ED NOTES
"C/C: 57 y.o. female arrived to the ED via personal transport from home for c/o abdominal pain. Pt reports acute onset of RUQ abdominal pain at approx 0600 this morning. Pt describes pain as "burning" and rates pain 10/10. No longer has gallbladder, per pt. Denies N/V/D, fevers, chills, SOB, or chest pain.     APPEARANCE: awake and alert in NAD. Pt appears disheveled.  SKIN: warm, dry, flaky, and intact. No breakdown or bruising.   MUSCULOSKELETAL: Generalized weakness. Pt moving all extremities spontaneously, no obvious swelling or deformities noted.   RESPIRATORY: Denies shortness of breath.Respirations unlabored.   CARDIAC: Denies CP, 2+ distal pulses; no peripheral edema  ABDOMEN: Continuous 10/10 "burning" RUQ pain. Denies N/V/D  : Pt w/ bilateral nephrostomy tubes and urostomy. Denies change in output.   NEUROLOGIC: AAO x 4; follows commands equal strength in all extremities; denies numbness/tingling.   "

## 2022-12-02 NOTE — ED NOTES
Pt currently ALONDRA to ostomy clinic for bag change and assessment. Nurse or paramedic to let ADRIAN Cline know when pt has returned to ED for further medical evaluation.

## 2022-12-03 NOTE — PLAN OF CARE
Initial Discharge Planning Case Management Assessment:    Patient admitted on 12-2-22  Chart reviewed  Care plan discussed with treatment team,    attending Dr Langston    Current dispo: pending  Went to colo-rectal clinic to see wound care team and obtain supplies for home  Transportation: has reliable  Consults following: case mgt  Case management  to follow    [facility] - Emergency Dept  Initial Discharge Assessment       Primary Care Provider: Primary Doctor No    Admission Diagnosis: No admission diagnoses are documented for this encounter.    Admission Date: 12/2/2022  Expected Discharge Date:     Discharge Barriers Identified: (P) Underinsured, Social    Payor: MEDICAID / Plan: [employer] [facility] ([employer]) / Product Type: Managed Medicaid /     Extended Emergency Contact Information  Primary Emergency Contact: YurikaleHammad  Address: [address] [place] [address]  Home Phone: [phone]  Relation: Spouse  Secondary Emergency Contact: Spisrael Keripelon  Mobile Phone: [phone]  Relation: Daughter    Discharge Plan A: (P) Home with family  Discharge Plan B: (P) Other (out patient wound care)      [id number] - [place], [place] - [address]  [address]  [place] [address]  Phone: [phone] Fax: [phone]    [id number] - [place] - [address]  [address]  [place] [address]  Phone: [phone] Fax: [phone]    [facility]  [address]  [place] [address]  Phone: [phone] Fax: [phone]      Initial Assessment (most recent)       Adult Discharge Assessment - 12/02/22 1808          Discharge Assessment    Assessment Type Discharge Planning Assessment (P)      Confirmed/corrected address, phone number and insurance Yes (P)      Confirmed Demographics Correct on Facesheet (P)      Source of Information patient;health record;health care  advocate (P)      Communicated OK with patient/caregiver Yes (P)      Lives With spouse (P)      Do you expect to return to your current living situation? Yes (P)      Do you have help at home or someone to help you manage your care at home? Yes (P)      Prior to hospitilization cognitive status: Alert/Oriented (P)      Current cognitive status: Alert/Oriented (P)      Equipment Currently Used at Home wound care supplies (P)      Patient currently being followed by outpatient case management? No (P)      Do you take prescription medications? Yes (P)      Do you have prescription coverage? Yes (P)      Do you have any problems affording any of your prescribed medications? No (P)      Is the patient taking medications as prescribed? yes (P)      How do you get to doctors appointments? family or friend will provide;health plan transportation (P)      Discharge Plan A Home with family (P)      Discharge Plan B Other (P)    out patient wound care    DME Needed Upon Discharge  wound care supplies (P)      Discharge Plan discussed with: Patient (P)      Discharge Barriers Identified Underinsured;Social (P)

## 2022-12-03 NOTE — ED PROVIDER NOTES
"Encounter Date: 12/2/2022       History     Chief Complaint   Patient presents with    Abdominal Pain     RUQ quadrant pain starting today. Nephrostomy x 2, normal output.      58 y/o female w/ a PMH of metastatic cervical cancer, DVT, bilateral nephrostomy tubes (replaced 11/2), and ostomy s/p colectomy (9/2020) presenting today with abdominal pain x 1 day. Pt states she was changing her ostomy bag last night around 1am and the ring was too large and has had 10/10 "burning" pain around the ostomy since removing it. Pt also endorses chills, SOB, HA, and back pain. She has had increased output from her ostomy but denies any blood or melena. Pt denies any anticoagulation use. Pt denies any fever, blurred vision, numbness/tingling.    The history is provided by the patient.   Review of patient's allergies indicates:   Allergen Reactions    Bee sting [allergen ext-venom-honey bee]      Rash      Grass pollen-bermuda, standard      rash     Past Medical History:   Diagnosis Date    Abnormal mammogram 08/25/2020    Acute blood loss anemia     Acute deep vein thrombosis (DVT) of lower extremity 12/09/2020    Advance care planning 04/30/2021    Anemia due to chronic blood loss     Anemia due to chronic kidney disease     Anxiety     Cardiovascular event risk -- low 09/14/2015    ASCVD 10-year risk 1.9% (with optimal risk factors 1.3%) as of 9/14/2015     Cervical cancer 2014    Chronic back pain     Colostomy care     Deep vein thrombosis     Depression     Diarrhea due to malabsorption 11/14/2018    Diarrhea due to malabsorption 11/14/2018    Difficult intubation     Disorder of kidney and ureter     DVT of lower extremity, bilateral 11/04/2020    Fibromyalgia     Fungemia 09/27/2020    Generalized abdominal pain 08/25/2020    GERD (gastroesophageal reflux disease)     Hemifacial spasm 09/16/2015    Hiatal hernia 2014    History of cervical cancer 10/11/2018    Hx of psychiatric care     Cymbalta, trazodone    Hypertension "     Hypomagnesemia 11/21/2018    Lactose intolerance     Metastatic squamous cell carcinoma to lymph node 10/11/2018    Nephrostomy tube displaced     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 drug 11/14/2018    Osteoarthritis of back     Peritonitis 09/22/2020    Pseudomonas urinary tract infection 04/21/2021    Psychiatric problem     Refusal of blood transfusions as patient is [employer]     Schatzki's ring 09/14/2015    Seen on outside EGD 05/2014, underwent esophageal dilatation. Bx were negative.     Seizures     Sleep stage dysfunction     Noted on PSG 06/2017; negative for obstructive sleep apnea     Stroke     Urinary tract infection associated with nephrostomy catheter 06/11/2020    Wound infection after surgery 09/24/2020     Past Surgical History:   Procedure Laterality Date    ANTEGRADE NEPHROSTOGRAPHY Bilateral 9/28/2020    Procedure: Nephrostogram - antegrade;  Surgeon: Leslie Balbuena MD;  Location: [facility] OR 1ST FLR;  Service: Urology;  Laterality: Bilateral;    ANTEGRADE NEPHROSTOGRAPHY Left 4/20/2021    Procedure: NEPHROSTOGRAM, ANTEGRADE;  Surgeon: Leslie Balbuena MD;  Location: [facility] OR 1ST FLR;  Service: Urology;  Laterality: Left;    ANTEGRADE NEPHROSTOGRAPHY Right 10/27/2022    Procedure: NEPHROSTOGRAM, ANTEGRADE;  Surgeon: Chirag Russ MD;  Location: [facility] OR 1ST FLR;  Service: Urology;  Laterality: Right;    BILATERAL OOPHORECTOMY  2015    CHOLECYSTECTOMY  11/09/2016    Done at [facility], path showed chronic cholecystitis and gallstones    cold knife conization  2014    COLECTOMY, RIGHT  9/17/2020    Procedure: COLECTOMY, RIGHT Extended;  Surgeon: Hammad Reynolds MD;  Location: [facility] OR 2ND FLR;  Service: Colon and Rectal;;    COLONOSCOPY  2014    [facility] N[facility]    COLONOSCOPY N/A 5/18/2018    Procedure: COLONOSCOPY;  Surgeon: Arden Gutiérrez MD;  Location: [facility] (4TH FLR);  Service: Endoscopy;  Laterality: N/A;    COLONOSCOPY N/A 7/28/2020     Procedure: COLONOSCOPY;  Surgeon: Hammad Reynolds MD;  Location: [facility] ENDO (4TH FLR);  Service: Colon and Rectal;  Laterality: N/A;  covid test [place] 7/25    CYSTOSCOPY WITH URETEROSCOPY, RETROGRADE PYELOGRAPHY, AND INSERTION OF STENT Bilateral 3/21/2020    Procedure: CYSTOSCOPY, WITH RETROGRADE PYELOGRAM,;  Surgeon: Leslie Balbuena MD;  Location: [facility] OR 1ST FLR;  Service: Urology;  Laterality: Bilateral;    DILATION OF NEPHROSTOMY TRACT Right 10/27/2022    Procedure: DILATION, NEPHROSTOMY TRACT;  Surgeon: Chirag Russ MD;  Location: [facility] OR 1ST FLR;  Service: Urology;  Laterality: Right;    ESOPHAGOGASTRODUODENOSCOPY  2014    ESOPHAGOGASTRODUODENOSCOPY  11/18/2020    ESOPHAGOGASTRODUODENOSCOPY N/A 11/18/2020    Procedure: ESOPHAGOGASTRODUODENOSCOPY (EGD);  Surgeon: Zenon Spencer MD;  Location: [facility];  Service: Endoscopy;  Laterality: N/A;    ESOPHAGOGASTRODUODENOSCOPY N/A 12/11/2020    Procedure: EGD (ESOPHAGOGASTRODUODENOSCOPY);  Surgeon: Juancho Muse MD;  Location: [facility] (2ND FLR);  Service: Endoscopy;  Laterality: N/A;    HYSTERECTOMY  2014    [facility] for cervical cancer    ILEOSTOMY  9/17/2020    Procedure: CREATION, ILEOSTOMY;  Surgeon: Hammad Reynolds MD;  Location: [facility] OR 2ND FLR;  Service: Colon and Rectal;;    LOOPOGRAM N/A 4/20/2021    Procedure: LOOPOGRAM;  Surgeon: Leslie Balbuena MD;  Location: [facility] OR 1ST FLR;  Service: Urology;  Laterality: N/A;    MOBILIZATION OF SPLENIC FLEXURE N/A 9/11/2020    Procedure: MOBILIZATION, COLONIC;  Surgeon: Hammad Reynolds MD;  Location: [facility] OR 2ND FLR;  Service: Colon and Rectal;  Laterality: N/A;    NEPHROSTOGRAPHY Bilateral 4/17/2021    Procedure: Nephrostogram;  Surgeon: Celeste Surgeon;  Location: [facility] CELESTE;  Service: Anesthesiology;  Laterality: Bilateral;    OOPHORECTOMY      PYELOSCOPY Right 10/27/2022    Procedure: PYELOSCOPY;  Surgeon: Chirag Russ MD;  Location: [facility] OR [address] [place] [address];  Service: Urology;  Laterality: Right;     REPLACEMENT OF NEPHROSTOMY TUBE Right 10/27/2022    Procedure: REPLACEMENT, NEPHROSTOMY TUBE;  Surgeon: Chirag Russ MD;  [facility];  Service: Urology;  Laterality: Right;    RETROPERITONEAL LYMPHADENECTOMY Right 9/17/2018    Procedure: LYMPHADENECTOMY, RETROPERITONEUM;  Surgeon: Sebas Reed MD;  Location: [facility] OR 2ND FLR;  Service: General;  Laterality: Right;  ILIAC    URETEROSCOPIC REMOVAL OF URETERIC CALCULUS Right 10/27/2022    Procedure: REMOVAL, CALCULUS, URETER, URETEROSCOPIC;  Surgeon: Chirag Russ MD;  Location: [facility] OR 1ST FLR;  Service: Urology;  Laterality: Right;    URETEROSCOPY Right 10/27/2022    Procedure: URETEROSCOPY-ANTEGRADE;  Surgeon: Chirag Russ MD;  [facility];  Service: Urology;  Laterality: Right;     Family History   Problem Relation Age of Onset    Heart disease Sister     Heart disease Maternal Grandmother     Colon cancer Father     Esophageal cancer Father     Cancer Father         Lung-smoker    Cancer Mother         Cervical    Cervical cancer Mother     Breast cancer Maternal Aunt     Suicide Daughter         jumped from parking structure    Drug abuse Daughter     Drug abuse Daughter     Rectal cancer Neg Hx     Stomach cancer Neg Hx     Crohn's disease Neg Hx     Ulcerative colitis Neg Hx     Diabetes Neg Hx     Hypertension Neg Hx      Social History     Tobacco Use    Smoking status: Former    Smokeless tobacco: Never   Substance Use Topics    Alcohol use: No     Alcohol/week: 0.0 standard drinks    Drug use: No     Review of Systems   Constitutional:  Positive for chills. Negative for fever.   HENT:  Negative for congestion and sore throat.    Respiratory:  Positive for shortness of breath. Negative for cough.    Cardiovascular:  Negative for chest pain.   Gastrointestinal:  Positive for abdominal pain and nausea. Negative for blood in stool and vomiting.        Increased output from ostomy   Genitourinary:  Positive for  flank pain. Negative for dysuria and hematuria.   Musculoskeletal:  Positive for back pain.   Skin:  Negative for rash.   Neurological:  Positive for light-headedness and headaches. Negative for weakness.   Psychiatric/Behavioral:  Negative for confusion.      Physical Exam     Initial Vitals [12/02/22 1425]   BP Pulse Resp Temp SpO2   110/63 91 18 97.4 °F (36.3 °C) 97 %      MAP       --         Physical Exam    Nursing note and vitals reviewed.  Constitutional: She appears well-developed and well-nourished. She is not diaphoretic. No distress.   Appears uncomfortable secondary to pain   HENT:   Head: Normocephalic and atraumatic.   Neck: Neck supple.   Normal range of motion.  Cardiovascular:  Normal rate, regular rhythm and normal heart sounds.     Exam reveals no gallop and no friction rub.       No murmur heard.  Pulmonary/Chest: Breath sounds normal. She has no wheezes. She has no rhonchi. She has no rales.   Abdominal: Abdomen is soft. Bowel sounds are normal. There is abdominal tenderness (generalized).   Ostomy noted to the R abdomen. Bilateral nephrostomy tubes in place. There is no rebound and no guarding.   Musculoskeletal:         General: Normal range of motion.      Cervical back: Normal range of motion and neck supple.     Neurological: She is alert and oriented to person, place, and time.   Skin: Skin is warm and dry. No rash noted. No erythema.   Psychiatric: She has a normal mood and affect.       ED Course   Procedures  Labs Reviewed   CBC W/ AUTO DIFFERENTIAL - Abnormal; Notable for the following components:       Result Value    MCH 26.8 (*)     MCHC 29.6 (*)     RDW 14.8 (*)     Mono # 1.1 (*)     Lymph % 15.3 (*)     All other components within normal limits   COMPREHENSIVE METABOLIC PANEL - Abnormal; Notable for the following components:    Chloride 111 (*)     CO2 20 (*)     BUN 22 (*)     Creatinine 1.8 (*)     Alkaline Phosphatase 151 (*)     eGFR 32.1 (*)     All other components within  normal limits   URINALYSIS - Abnormal; Notable for the following components:    Appearance, UA Hazy (*)     Protein, UA 2+ (*)     Occult Blood UA 2+ (*)     Leukocytes, UA 3+ (*)     All other components within normal limits   URINALYSIS - Abnormal; Notable for the following components:    Appearance, UA Hazy (*)     Protein, UA 2+ (*)     Occult Blood UA 2+ (*)     Leukocytes, UA 3+ (*)     All other components within normal limits   URINALYSIS MICROSCOPIC - Abnormal; Notable for the following components:    RBC, UA 94 (*)     WBC, UA >100 (*)     WBC Clumps, UA Many (*)     Bacteria Many (*)     All other components within normal limits   LACTIC ACID, PLASMA          Imaging Results              CT Abdomen Pelvis With Contrast (Final result)  Result time 12/02/22 20:30:42      Final result by Hammad Swartz MD (12/02/22 20:30:42)                   Impression:      1. No acute abdominopelvic abnormality.  2. Postoperative changes of colon conduit urinary diversion with left lower quadrant urostomy and bilateral nephrostomy tube.  Interval improvement of the hydronephrosis with almost complete resolution.  3. Additional stable findings as above.    Electronically signed by resident: Reina Cho  Date:    12/02/2022  Time:    19:44    Electronically signed by: Hammad Swartz MD  Date:    12/02/2022  Time:    20:30               Narrative:    EXAMINATION:  CT ABDOMEN PELVIS WITH CONTRAST    CLINICAL HISTORY:  Abdominal pain, acute, nonlocalized;    TECHNIQUE:  Axial images of the abdomen and pelvis were acquired after the use of 75 cc Bcyo137 IV contrast .  Coronal and sagittal reconstructions were also obtained    COMPARISON:  CT 10/23/2022 08/24/2022    FINDINGS:  HEART: Normal in size. No pericardial effusion.    LUNG BASES: Visualized portions of the lungs are clear.    LIVER: Normal in size and contour.  No focal hepatic lesion.    GALLBLADDER: Surgically absent    BILE DUCTS: Stable prominence of the  common bile duct.  No intrahepatic ductal dilatation.    PANCREAS: No mass or peripancreatic fat stranding.    SPLEEN: Unremarkable.    ADRENALS: Unremarkable.    KIDNEYS/URETERS: Normal in size and location. Bilateral nephrostomy tube in place.  Right renal calculi measuring up to 0.6 cm.  Left renal cyst.  Mid right ureter stone measuring 0.2 cm.  Postoperative changes of colon conduit urinary diversion with left lower quadrant urostomy.  Interval improvement of bilateral hydronephrosis.  Grossly similar small cyst at the left renal lower pole.    BLADDER: Decompressed.    REPRODUCTIVE: Uterus surgically absent.    STOMACH/DUODENUM: Unremarkable.    BOWEL/MESENTERY: Postoperative changes of right hemicolectomy with ileostomy in the right lower quadrant.  Long Charlotte pouch relatively decompressed.  No bowel obstruction.    PERITONEUM: No intraperitoneal free air or fluid    LYMPH NODES: No retroperitoneal lymphadenopathy.    VESSELS: No aneurysm. No significant calcific atherosclerosis.    ABDOMINAL WALL:  Unremarkable.    BONES: No acute fracture. No suspicious osseous lesions.                                       Medications   morphine injection 4 mg (4 mg Intravenous Given 12/2/22 1809)   ondansetron injection 4 mg (4 mg Intravenous Given 12/2/22 1812)   sodium chloride 0.9% bolus 1,000 mL (0 mLs Intravenous Stopped 12/2/22 2042)   iohexoL (OMNIPAQUE 350) injection 75 mL (75 mLs Intravenous Given 12/2/22 1943)     Medical Decision Making:   History:   Old Medical Records: I decided to obtain old medical records.  Old Records Summarized: records from clinic visits and records from previous admission(s).       <> Summary of Records: Nephrostomy tube exchanged on 11/2/22  Clinical Tests:   Lab Tests: Ordered and Reviewed  Radiological Study: Ordered and Reviewed     APC / Resident Notes:   58 y/o female w/ a PMH of metastatic cervical cancer, DVT, bilateral nephrostomy tubes (replaced 11/2), and ostomy s/p  colectomy (9/2020) presenting today with abdominal pain x 1 day. VSS. Appears uncomfortable secondary to pain. Abdomen with generalized tenderness to palpation. Ostomy noted to the R abdomen. Bilateral nephrostomy tubes noted. After arrival to the ED - ostomy nurse called ED  and asked if patient could present to clinic for ostomy care. She was escorted to clinic, ostomy was changed by nurse and new bag placed. After this, patient reports her pain is now 8/10 (from 10/10). DDx includes but is not limited to sepsis, UTI, pyelonephritis, SBO, constipation, colitis, mass. Will get labs, CT abdomen/pelvis.    No leukocytosis or anemia. Mild ODESSA noted, IVF ordered. Lactic acid normal. UA concerning for infection. Urine culture pending.    CT abdomen/pelvis with no acute abnormality. Hydronephrosis almost completely resolved.    Symptoms improved with treatment in the ED. I do not feel that she needs any further labs or imaging at this time. Stable for discharge.     She was discharged with a prescription for omnicef.  She will follow up with her PCP, urology and CRS.  Strict ED return precautions given.  All of the patient's questions were answered.  I reviewed the patient's chart, labs, and imaging and discussed the case with my supervising physician.          Attending Attestation:     Physician Attestation Statement for NP/PA:   I discussed this assessment and plan of this patient with the NP/PA, but I did not personally examine the patient. The face to face encounter was performed by the NP/PA.                         Clinical Impression:   Final diagnoses:  [R10.9] Abdominal pain, unspecified abdominal location (Primary)  [M54.9] Back pain, unspecified back location, unspecified back pain laterality, unspecified chronicity      ED Disposition Condition    Discharge Stable          ED Prescriptions       Medication Sig Dispense Start Date End Date Auth. Provider    cefdinir (OMNICEF) 300 MG capsule Take  1 capsule (300 mg total) by mouth 2 (two) times daily. for 10 days 20 capsule 12/2/2022 12/12/2022 Marlyn Kc PA-C          Follow-up Information       Follow up With Specialties Details Why Contact Info Additional Information    [facility] - [facility] 4th Fl Urology   [address] [facility] [id number] [phone] Main Building, 4th Floor Please park in [facility] and take [facility] elevator    Addie Mathews NP Colon and Rectal Surgery, Wound Care   [address] [facility] [address]  [phone]                Marlyn Kc PA-C  12/02/22 2106       Betty Langston MD  12/03/22 5892

## 2022-12-03 NOTE — PLAN OF CARE
12/02/22 1813   Post-Acute Status   Post-Acute Authorization Other   Other Status No Post-Acute Service Needs   Discharge Delays None known at this time   Discharge Plan   Discharge Plan A Home with family   Discharge Plan B Other  (out pt wound care)

## 2022-12-05 PROBLEM — N17.9 AKI (ACUTE KIDNEY INJURY): Status: RESOLVED | Noted: 2020-03-21 | Resolved: 2022-12-05

## 2022-12-09 ENCOUNTER — LAB VISIT (OUTPATIENT)
Dept: LAB | Facility: HOSPITAL | Age: 57
End: 2022-12-09
Payer: MEDICAID

## 2022-12-09 ENCOUNTER — TELEPHONE (OUTPATIENT)
Dept: INTERVENTIONAL RADIOLOGY/VASCULAR | Facility: HOSPITAL | Age: 57
End: 2022-12-09
Payer: MEDICAID

## 2022-12-09 DIAGNOSIS — N13.5 OBSTRUCTION OF URETER, UNSPECIFIED LATERALITY: ICD-10-CM

## 2022-12-09 LAB
INR PPP: 1 (ref 0.8–1.2)
PROTHROMBIN TIME: 10.1 SEC (ref 9–12.5)

## 2022-12-09 PROCEDURE — 85610 PROTHROMBIN TIME: CPT | Performed by: FAMILY MEDICINE

## 2022-12-09 PROCEDURE — 36415 COLL VENOUS BLD VENIPUNCTURE: CPT | Performed by: FAMILY MEDICINE

## 2022-12-09 NOTE — NURSING
pre-procedure call complete, patient instructed not to eat or drink anything after midnight the night before procedure, patient aware that she will need someone to provide transport home (no driving for at least 24 hours after procedure), patient will also need someone to monitor her for 8 hours after discharge, medication reviewed, arrival time and location given, expected length of stay reviewed, Covid screening complete, patient verbalized understanding of the above.

## 2022-12-12 ENCOUNTER — HOSPITAL ENCOUNTER (OUTPATIENT)
Dept: INTERVENTIONAL RADIOLOGY/VASCULAR | Facility: HOSPITAL | Age: 57
Discharge: HOME | End: 2022-12-12
Attending: FAMILY MEDICINE
Payer: MEDICAID

## 2022-12-12 VITALS
WEIGHT: 187 LBS | SYSTOLIC BLOOD PRESSURE: 97 MMHG | BODY MASS INDEX: 27.7 KG/M2 | TEMPERATURE: 98 F | DIASTOLIC BLOOD PRESSURE: 58 MMHG | OXYGEN SATURATION: 100 % | HEART RATE: 66 BPM | HEIGHT: 69 IN | RESPIRATION RATE: 19 BRPM

## 2022-12-12 DIAGNOSIS — N13.5 OBSTRUCTION OF URETER, UNSPECIFIED LATERALITY: ICD-10-CM

## 2022-12-12 DIAGNOSIS — N13.30 HYDRONEPHROSIS, UNSPECIFIED HYDRONEPHROSIS TYPE: ICD-10-CM

## 2022-12-12 PROCEDURE — 25500020 PHARM REV CODE 255: Performed by: RADIOLOGY

## 2022-12-12 PROCEDURE — 63600175 PHARM REV CODE 636 W HCPCS: Performed by: RADIOLOGY

## 2022-12-12 PROCEDURE — 50435 IR NEPHROSTOMY TUBE CHANGE: ICD-10-PCS | Mod: 50,,, | Performed by: RADIOLOGY

## 2022-12-12 PROCEDURE — 25000003 PHARM REV CODE 250: Performed by: RADIOLOGY

## 2022-12-12 PROCEDURE — C1894 INTRO/SHEATH, NON-LASER: HCPCS

## 2022-12-12 PROCEDURE — 50435 EXCHANGE NEPHROSTOMY CATH: CPT | Mod: 50,,, | Performed by: RADIOLOGY

## 2022-12-12 PROCEDURE — A4357 BEDSIDE DRAINAGE BAG: HCPCS

## 2022-12-12 RX ORDER — FENTANYL CITRATE 50 UG/ML
INJECTION, SOLUTION INTRAMUSCULAR; INTRAVENOUS
Status: COMPLETED | OUTPATIENT
Start: 2022-12-12 | End: 2022-12-12

## 2022-12-12 RX ORDER — ONDANSETRON 2 MG/ML
4 INJECTION INTRAMUSCULAR; INTRAVENOUS EVERY 6 HOURS PRN
Status: DISCONTINUED | OUTPATIENT
Start: 2022-12-12 | End: 2022-12-13 | Stop reason: HOSPADM

## 2022-12-12 RX ORDER — MIDAZOLAM HYDROCHLORIDE 1 MG/ML
INJECTION INTRAMUSCULAR; INTRAVENOUS
Status: COMPLETED | OUTPATIENT
Start: 2022-12-12 | End: 2022-12-12

## 2022-12-12 RX ORDER — CEFTRIAXONE 1 G/50ML
INJECTION, SOLUTION INTRAVENOUS
Status: COMPLETED | OUTPATIENT
Start: 2022-12-12 | End: 2022-12-12

## 2022-12-12 RX ORDER — LIDOCAINE HYDROCHLORIDE 10 MG/ML
1 INJECTION, SOLUTION EPIDURAL; INFILTRATION; INTRACAUDAL; PERINEURAL ONCE
Status: DISCONTINUED | OUTPATIENT
Start: 2022-12-12 | End: 2022-12-13 | Stop reason: HOSPADM

## 2022-12-12 RX ORDER — SODIUM CHLORIDE 9 MG/ML
75 INJECTION, SOLUTION INTRAVENOUS CONTINUOUS
Status: DISCONTINUED | OUTPATIENT
Start: 2022-12-12 | End: 2022-12-13 | Stop reason: HOSPADM

## 2022-12-12 RX ORDER — LIDOCAINE HYDROCHLORIDE 10 MG/ML
INJECTION INFILTRATION; PERINEURAL
Status: COMPLETED | OUTPATIENT
Start: 2022-12-12 | End: 2022-12-12

## 2022-12-12 RX ADMIN — CEFTRIAXONE 1 G: 1 INJECTION, SOLUTION INTRAVENOUS at 12:12

## 2022-12-12 RX ADMIN — FENTANYL CITRATE 50 MCG: 50 INJECTION, SOLUTION INTRAMUSCULAR; INTRAVENOUS at 12:12

## 2022-12-12 RX ADMIN — MIDAZOLAM HYDROCHLORIDE 0.5 MG: 1 INJECTION INTRAMUSCULAR; INTRAVENOUS at 12:12

## 2022-12-12 RX ADMIN — IOHEXOL 20 ML: 300 INJECTION, SOLUTION INTRAVENOUS at 12:12

## 2022-12-12 RX ADMIN — MIDAZOLAM HYDROCHLORIDE 1 MG: 1 INJECTION INTRAMUSCULAR; INTRAVENOUS at 12:12

## 2022-12-12 RX ADMIN — FENTANYL CITRATE 25 MCG: 50 INJECTION, SOLUTION INTRAMUSCULAR; INTRAVENOUS at 12:12

## 2022-12-12 RX ADMIN — LIDOCAINE HYDROCHLORIDE 5 ML: 10 INJECTION, SOLUTION INFILTRATION; PERINEURAL at 12:12

## 2022-12-12 NOTE — DISCHARGE SUMMARY
Radiology Discharge Summary      Hospital Course: No complications    Admit Date: 12/12/2022  Discharge Date: 12/12/2022     Instructions Given to Patient: Yes  Diet: Resume prior diet  Activity: activity as tolerated and no driving for today    Description of Condition on Discharge: Stable  Vital Signs (Most Recent): Temp: 98.8 °F (37.1 °C) (12/12/22 1009)  Pulse: 66 (12/12/22 1009)  Resp: 18 (12/12/22 1009)  BP: (!) 124/55 (12/12/22 1009)  SpO2: 100 % (12/12/22 1009)    Discharge Disposition: Home    Discharge Diagnosis: Hydronephrosis s/p bilateral neph tube change    Follow up: As scheduled    Shaw Alcantara M.D.  Interventional Radiology  Department of Radiology  Pager: [phone]

## 2022-12-12 NOTE — PROCEDURES
Radiology Post-Procedure Note    Pre Op Diagnosis: right and left hydronephrosis    Post Op Diagnosis: right and left hydronephrosis    Procedure:right and left percutaneous nephrostomy tube change    Procedure performed by: Shaw Alcantara MD    Written Informed Consent Obtained: Yes    Specimen Removed: NO    Estimated Blood Loss: Minimal    Findings: Local anesthesia and moderate sedation were used.      The indwelling nephrostomy tube was removed over a wire and a new 12 Fr drainage catheter was placed under fluoroscopic guidance.  The drain was secured in place and dressed.      There were no complications and the patient tolerated the procedure well.  Please see Imaging report for further details.      Shaw Alcantara M.D.  Interventional Radiology  Department of Radiology  Pager: [phone]

## 2022-12-12 NOTE — PLAN OF CARE
Bilateral nephrostomy tube change complete. Pt tolerated well. VSS. No signs or symptoms of distress noted.  Pt will be transferred to ROCU bed and report to be given at bedside.

## 2022-12-12 NOTE — CARE UPDATE
Pt fully recovered and states full understanding of discharge instructions. Pt to leave shortly with escort to meet  at [facility] ]Exit.

## 2022-12-12 NOTE — CARE UPDATE
Pt arrived to MPU 4 for recovery of bilateral neph tube exchange. Pt to recover for 2hrs and then d/c to [facility] with martine.

## 2022-12-12 NOTE — H&P
Radiology History & Physical      SUBJECTIVE:       History of Present Illness:  Edita Riley is a 57 y.o. female who presents for nephrostomy tubes check and possible exchange.    Past Medical History:   Diagnosis Date    Abnormal mammogram 08/25/2020    Acute blood loss anemia     Acute deep vein thrombosis (DVT) of lower extremity 12/09/2020    Advance care planning 04/30/2021    Anemia due to chronic blood loss     Anemia due to chronic kidney disease     Anxiety     Cardiovascular event risk -- low 09/14/2015    ASCVD 10-year risk 1.9% (with optimal risk factors 1.3%) as of 9/14/2015     Cervical cancer 2014    Chronic back pain     Colostomy care     Deep vein thrombosis     Depression     Diarrhea due to malabsorption 11/14/2018    Diarrhea due to malabsorption 11/14/2018    Difficult intubation     Disorder of kidney and ureter     DVT of lower extremity, bilateral 11/04/2020    Fibromyalgia     Fungemia 09/27/2020    Generalized abdominal pain 08/25/2020    GERD (gastroesophageal reflux disease)     Hemifacial spasm 09/16/2015    Hiatal hernia 2014    History of cervical cancer 10/11/2018    Hx of psychiatric care     Cymbalta, trazodone    Hypertension     Hypomagnesemia 11/21/2018    Lactose intolerance     Metastatic squamous cell carcinoma to lymph node 10/11/2018    Nephrostomy tube displaced     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 drug 11/14/2018    Osteoarthritis of back     Peritonitis 09/22/2020    Pseudomonas urinary tract infection 04/21/2021    Psychiatric problem     Refusal of blood transfusions as patient is Jehovah's witness     Schatzki's ring 09/14/2015    Seen on outside EGD 05/2014, underwent esophageal dilatation. Bx were negative.     Seizures     Sleep stage dysfunction     Noted on PSG 06/2017; negative for obstructive sleep apnea     Stroke     Urinary tract infection associated with nephrostomy catheter 06/11/2020    Wound infection after surgery 09/24/2020     Past  Surgical History:   Procedure Laterality Date    ANTEGRADE NEPHROSTOGRAPHY Bilateral 9/28/2020    Procedure: Nephrostogram - antegrade;  Surgeon: Leslie Balbuena MD;  Location: [facility] OR 1ST FLR;  Service: Urology;  Laterality: Bilateral;    ANTEGRADE NEPHROSTOGRAPHY Left 4/20/2021    Procedure: NEPHROSTOGRAM, ANTEGRADE;  Surgeon: Leslie Balbuena MD;  Location: [facility] OR 1ST FLR;  Service: Urology;  Laterality: Left;    ANTEGRADE NEPHROSTOGRAPHY Right 10/27/2022    Procedure: NEPHROSTOGRAM, ANTEGRADE;  Surgeon: Chirag Russ MD;  Location: [facility] OR 1ST FLR;  Service: Urology;  Laterality: Right;    BILATERAL OOPHORECTOMY  2015    CHOLECYSTECTOMY  11/09/2016    Done at [facility], path showed chronic cholecystitis and gallstones    cold knife conization  2014    COLECTOMY, RIGHT  9/17/2020    Procedure: COLECTOMY, RIGHT Extended;  Surgeon: Hammad Reynolds MD;  Location: [facility] OR 2ND FLR;  Service: Colon and Rectal;;    COLONOSCOPY  2014    COLONOSCOPY N/A 10/24/2016    at [facility], Dr Gutiérrez    COLONOSCOPY N/A 5/18/2018    Procedure: COLONOSCOPY;  Surgeon: Arden Gutiérrez MD;  Location: [facility] ENDO (4TH FLR);  Service: Endoscopy;  Laterality: N/A;    COLONOSCOPY N/A 7/28/2020    Procedure: COLONOSCOPY;  Surgeon: Hammad Reynolds MD;  Location: [facility] ENDO (4TH FLR);  Service: Colon and Rectal;  Laterality: N/A;  covid test Lawson 7/25    CYSTOSCOPY WITH URETEROSCOPY, RETROGRADE PYELOGRAPHY, AND INSERTION OF STENT Bilateral 3/21/2020    Procedure: CYSTOSCOPY, WITH RETROGRADE PYELOGRAM,;  Surgeon: Leslie Balbuena MD;  Location: [facility] OR 1ST FLR;  Service: Urology;  Laterality: Bilateral;    DILATION OF NEPHROSTOMY TRACT Right 10/27/2022    Procedure: DILATION, NEPHROSTOMY TRACT;  Surgeon: Chirag Russ MD;  Location: [facility] OR 1ST FLR;  Service: Urology;  Laterality: Right;    ESOPHAGOGASTRODUODENOSCOPY  2014    ESOPHAGOGASTRODUODENOSCOPY  11/18/2020    ESOPHAGOGASTRODUODENOSCOPY N/A 11/18/2020    Procedure:  ESOPHAGOGASTRODUODENOSCOPY (EGD);  Surgeon: Zenon Spencer MD;  Location: [facility] ENDO;  Service: Endoscopy;  Laterality: N/A;    ESOPHAGOGASTRODUODENOSCOPY N/A 12/11/2020    Procedure: EGD (ESOPHAGOGASTRODUODENOSCOPY);  Surgeon: Juancho Muse MD;  Location: [facility] (2ND FLR);  Service: Endoscopy;  Laterality: N/A;    HYSTERECTOMY  2014    [facility] for cervical cancer    ILEOSTOMY  9/17/2020    Procedure: CREATION, ILEOSTOMY;  Surgeon: Hammad Reynolds MD;  Location: [facility] OR 2ND FLR;  Service: Colon and Rectal;;    LOOPOGRAM N/A 4/20/2021    Procedure: LOOPOGRAM;  Surgeon: Leslie Balbuena MD;  Location: [facility] OR 1ST FLR;  Service: Urology;  Laterality: N/A;    MOBILIZATION OF SPLENIC FLEXURE N/A 9/11/2020    Procedure: MOBILIZATION, COLONIC;  Surgeon: Hammad Reynolds MD;  Location: [facility] OR 2ND FLR;  Service: Colon and Rectal;  Laterality: N/A;    NEPHROSTOGRAPHY Bilateral 4/17/2021    Procedure: Nephrostogram;  Surgeon: Celeste Surgeon;  Location: [facility];  Service: Anesthesiology;  Laterality: Bilateral;    OOPHORECTOMY      PYELOSCOPY Right 10/27/2022    Procedure: PYELOSCOPY;  Surgeon: Chirag Russ MD;  Location: [facility] OR 1ST FLR;  Service: Urology;  Laterality: Right;    REPLACEMENT OF NEPHROSTOMY TUBE Right 10/27/2022    Procedure: REPLACEMENT, NEPHROSTOMY TUBE;  Surgeon: Chirag Russ MD;  Location: [facility] OR 1ST FLR;  Service: Urology;  Laterality: Right;    RETROPERITONEAL LYMPHADENECTOMY Right 9/17/2018    Procedure: LYMPHADENECTOMY, RETROPERITONEUM;  Surgeon: Sebas Reed MD;  Location: [facility] OR 2ND FLR;  Service: General;  Laterality: Right;  ILIAC    URETEROSCOPIC REMOVAL OF URETERIC CALCULUS Right 10/27/2022    Procedure: REMOVAL, CALCULUS, URETER, URETEROSCOPIC;  Surgeon: Chirag Russ MD;  Location: [facility] OR 1ST FLR;  Service: Urology;  Laterality: Right;    URETEROSCOPY Right 10/27/2022    Procedure: URETEROSCOPY-ANTEGRADE;  Surgeon: Chirag Russ MD;  Location: [facility] OR [facility]  FLR;  Service: Urology;  Laterality: Right;       Home Meds:   Prior to Admission medications    Medication Sig Start Date End Date Taking? Authorizing Provider   acetaminophen (TYLENOL) 325 MG tablet Take 2 tablets (650 mg total) by mouth 3 (three) times daily. 9/1/22  Yes Danuta Barboza MD   cefdinir (OMNICEF) 300 MG capsule Take 1 capsule (300 mg total) by mouth 2 (two) times daily. for 10 days 12/2/22 12/12/22 Yes Marlyn Kc PA-C   gabapentin (NEURONTIN) 100 MG capsule Take 2 capsules (200 mg total) by mouth every evening. 9/1/22  Yes Danuta Barboza MD   ketorolac (TORADOL) 10 mg tablet Take 1 tablet (10 mg total) by mouth every 6 (six) hours as needed for Pain. 9/8/22  Yes Denise Brooks NP   mirtazapine (REMERON SOL-TAB) 15 MG disintegrating tablet Dissolve 1 tablet (15 mg total) by mouth nightly. 2/15/22 2/15/23 Yes Diony Ram MD   traMADoL (ULTRAM) 50 mg tablet Take 1 tablet (50 mg total) by mouth every 6 (six) hours. 10/27/22  Yes Karina Beverly MD   loperamide (IMODIUM A-D) 2 mg Tab Take 1 tablet (2 mg total) by mouth 4 (four) times daily as needed (diarrhea). 9/22/22 12/21/22  Addie Mathews NP   melatonin (MELATIN) 3 mg tablet Take 2 tablets (6 mg total) by mouth nightly as needed for Insomnia. 9/6/21   Kacey Bergeron MD   sodium bicarbonate 650 MG tablet Take 2 tablets (1,300 mg total) by mouth 2 (two) times daily. 2/15/22 2/15/23  Doiny Ram MD   sucralfate (CARAFATE) 1 gram tablet Take 1 tablet (1 g total) by mouth 4 (four) times daily before meals and nightly. 9/1/22   Danuta Barboza MD     Anticoagulants/Antiplatelets: no anticoagulation    Allergies:   Review of patient's allergies indicates:   Allergen Reactions    Bee sting [allergen ext-venom-honey bee]      Rash      Grass pollen-bermuda, standard      rash     Sedation History:  no adverse reactions    Review of Systems:   Hematological: no known coagulopathies  Respiratory: no shortness of  breath  Cardiovascular: no chest pain  Gastrointestinal: no abdominal pain  Genito-Urinary: no dysuria  Musculoskeletal: negative  Neurological: no TIA or stroke symptoms         OBJECTIVE:     Vital Signs (Most Recent)       Physical Exam:  ASA: 2  Mallampati: 2    General: no acute distress  Mental Status: alert and oriented to person, place and time  HEENT: normocephalic, atraumatic  Chest: unlabored breathing  Heart: regular heart rate  Abdomen: nondistended  Extremity: moves all extremities    Laboratory  Lab Results   Component Value Date    INR 1.0 12/09/2022       Lab Results   Component Value Date    WBC 9.96 12/02/2022    HGB 13.0 12/02/2022    HCT 43.9 12/02/2022    MCV 91 12/02/2022     12/02/2022      Lab Results   Component Value Date    GLU 93 12/02/2022     12/02/2022    K 5.0 12/02/2022     (H) 12/02/2022    CO2 20 (L) 12/02/2022    BUN 22 (H) 12/02/2022    CREATININE 1.8 (H) 12/02/2022    CALCIUM 9.4 12/02/2022    MG 1.9 09/01/2022    ALT 20 12/02/2022    AST 25 12/02/2022    ALBUMIN 3.5 12/02/2022    BILITOT 0.3 12/02/2022    BILIDIR 0.2 12/04/2020       ASSESSMENT/PLAN:     Sedation Plan: up to moderate.  Patient will undergo neph tubes check and possible exchange.    Reina Cho MD MSCR  PGY-4 Radiology Resident

## 2022-12-12 NOTE — PLAN OF CARE
Pt arrived to IR room 189 for R neph tube exchange. Pt oriented to unit and staff. Plan of care reviewed with patient, patient verbalizes understanding. Comfort measures utilized. Pt safely transferred from stretcher to procedural table. Fall risk reviewed with patient, fall risk interventions maintained. Safety strap applied, positioner pillows utilized to minimize pressure points. Blankets applied. Pt prepped and draped utilizing standard sterile technique. Patient placed on continuous monitoring, as required by sedation policy. Timeouts completed utilizing standard universal time-out, per department and facility policy. RN to remain at bedside, continuous monitoring maintained. Pt resting comfortably. Denies pain/discomfort. Will continue to monitor. See flow sheets for monitoring, medication administration, and updates.

## 2022-12-12 NOTE — DISCHARGE INSTRUCTIONS
For scheduling: Call at [phone]    For questions or concerns call: DIANA MON-FRI 8 AM- 5PM [phone]. Radiology resident on call [phone].    For immediate concerns that are not emergent, you may call our radiology clinic at: [phone]

## 2022-12-13 DIAGNOSIS — N13.30 HYDRONEPHROSIS, UNSPECIFIED HYDRONEPHROSIS TYPE: Primary | ICD-10-CM

## 2023-01-04 ENCOUNTER — HOSPITAL ENCOUNTER (EMERGENCY)
Facility: HOSPITAL | Age: 58
Discharge: HOME | End: 2023-01-04
Attending: EMERGENCY MEDICINE
Payer: MEDICAID

## 2023-01-04 VITALS
OXYGEN SATURATION: 99 % | RESPIRATION RATE: 18 BRPM | TEMPERATURE: 98 F | SYSTOLIC BLOOD PRESSURE: 102 MMHG | HEIGHT: 69 IN | DIASTOLIC BLOOD PRESSURE: 54 MMHG | HEART RATE: 79 BPM | WEIGHT: 180 LBS | BODY MASS INDEX: 26.66 KG/M2

## 2023-01-04 DIAGNOSIS — R07.9 CHEST PAIN: ICD-10-CM

## 2023-01-04 LAB
ALBUMIN SERPL BCP-MCNC: 3.6 G/DL (ref 3.5–5.2)
ALP SERPL-CCNC: 153 U/L (ref 55–135)
ALT SERPL W/O P-5'-P-CCNC: 14 U/L (ref 10–44)
ANION GAP SERPL CALC-SCNC: 9 MMOL/L (ref 8–16)
AST SERPL-CCNC: 18 U/L (ref 10–40)
BASOPHILS # BLD AUTO: 0.05 K/UL (ref 0–0.2)
BASOPHILS NFR BLD: 0.5 % (ref 0–1.9)
BILIRUB SERPL-MCNC: 0.2 MG/DL (ref 0.1–1)
BNP SERPL-MCNC: 17 PG/ML (ref 0–99)
BUN SERPL-MCNC: 20 MG/DL (ref 6–20)
CALCIUM SERPL-MCNC: 9.7 MG/DL (ref 8.7–10.5)
CHLORIDE SERPL-SCNC: 111 MMOL/L (ref 95–110)
CO2 SERPL-SCNC: 19 MMOL/L (ref 23–29)
CREAT SERPL-MCNC: 1.5 MG/DL (ref 0.5–1.4)
DIFFERENTIAL METHOD: ABNORMAL
EOSINOPHIL # BLD AUTO: 0.2 K/UL (ref 0–0.5)
EOSINOPHIL NFR BLD: 1.9 % (ref 0–8)
ERYTHROCYTE [DISTWIDTH] IN BLOOD BY AUTOMATED COUNT: 14.8 % (ref 11.5–14.5)
EST. GFR  (NO RACE VARIABLE): 39.9 ML/MIN/1.73 M^2
GLUCOSE SERPL-MCNC: 92 MG/DL (ref 70–110)
HCT VFR BLD AUTO: 41.3 % (ref 37–48.5)
HGB BLD-MCNC: 12.6 G/DL (ref 12–16)
IMM GRANULOCYTES # BLD AUTO: 0.02 K/UL (ref 0–0.04)
IMM GRANULOCYTES NFR BLD AUTO: 0.2 % (ref 0–0.5)
INFLUENZA A, MOLECULAR: NOT DETECTED
INFLUENZA B, MOLECULAR: NOT DETECTED
LYMPHOCYTES # BLD AUTO: 1.7 K/UL (ref 1–4.8)
LYMPHOCYTES NFR BLD: 16.8 % (ref 18–48)
MCH RBC QN AUTO: 27.1 PG (ref 27–31)
MCHC RBC AUTO-ENTMCNC: 30.5 G/DL (ref 32–36)
MCV RBC AUTO: 89 FL (ref 82–98)
MONOCYTES # BLD AUTO: 1 K/UL (ref 0.3–1)
MONOCYTES NFR BLD: 9.8 % (ref 4–15)
NEUTROPHILS # BLD AUTO: 7.2 K/UL (ref 1.8–7.7)
NEUTROPHILS NFR BLD: 70.8 % (ref 38–73)
NRBC BLD-RTO: 0 /100 WBC
PLATELET # BLD AUTO: 276 K/UL (ref 150–450)
PMV BLD AUTO: 9.8 FL (ref 9.2–12.9)
POTASSIUM SERPL-SCNC: 4.9 MMOL/L (ref 3.5–5.1)
PROT SERPL-MCNC: 8 G/DL (ref 6–8.4)
RBC # BLD AUTO: 4.65 M/UL (ref 4–5.4)
RSV AG BY MOLECULAR METHOD: NOT DETECTED
SARS-COV-2 RNA RESP QL NAA+PROBE: NOT DETECTED
SODIUM SERPL-SCNC: 139 MMOL/L (ref 136–145)
TROPONIN I SERPL DL<=0.01 NG/ML-MCNC: 0.01 NG/ML (ref 0–0.03)
TROPONIN I SERPL DL<=0.01 NG/ML-MCNC: <0.006 NG/ML (ref 0–0.03)
WBC # BLD AUTO: 10.2 K/UL (ref 3.9–12.7)

## 2023-01-04 PROCEDURE — 85025 COMPLETE CBC W/AUTO DIFF WBC: CPT | Performed by: EMERGENCY MEDICINE

## 2023-01-04 PROCEDURE — 25000003 PHARM REV CODE 250: Performed by: EMERGENCY MEDICINE

## 2023-01-04 PROCEDURE — 0241U SARS-COV2 (COVID) WITH FLU/RSV BY PCR: CPT | Performed by: EMERGENCY MEDICINE

## 2023-01-04 PROCEDURE — 80053 COMPREHEN METABOLIC PANEL: CPT | Performed by: EMERGENCY MEDICINE

## 2023-01-04 PROCEDURE — 83880 ASSAY OF NATRIURETIC PEPTIDE: CPT | Performed by: EMERGENCY MEDICINE

## 2023-01-04 PROCEDURE — 93005 ELECTROCARDIOGRAM TRACING: CPT

## 2023-01-04 PROCEDURE — 94761 N-INVAS EAR/PLS OXIMETRY MLT: CPT

## 2023-01-04 PROCEDURE — 99284 PR EMERGENCY DEPT VISIT,LEVEL IV: ICD-10-PCS | Mod: CS,,, | Performed by: EMERGENCY MEDICINE

## 2023-01-04 PROCEDURE — 93010 EKG 12-LEAD: ICD-10-PCS | Mod: ,,, | Performed by: INTERNAL MEDICINE

## 2023-01-04 PROCEDURE — 99285 EMERGENCY DEPT VISIT HI MDM: CPT | Mod: 25

## 2023-01-04 PROCEDURE — 99284 EMERGENCY DEPT VISIT MOD MDM: CPT | Mod: CS,,, | Performed by: EMERGENCY MEDICINE

## 2023-01-04 PROCEDURE — 84484 ASSAY OF TROPONIN QUANT: CPT | Performed by: EMERGENCY MEDICINE

## 2023-01-04 PROCEDURE — 93010 ELECTROCARDIOGRAM REPORT: CPT | Mod: ,,, | Performed by: INTERNAL MEDICINE

## 2023-01-04 RX ORDER — ACETAMINOPHEN 325 MG/1
650 TABLET ORAL EVERY 6 HOURS PRN
Qty: 30 TABLET | Refills: 0 | Status: ON HOLD | OUTPATIENT
Start: 2023-01-04 | End: 2023-02-18 | Stop reason: HOSPADM

## 2023-01-04 RX ORDER — ACETAMINOPHEN 500 MG
1000 TABLET ORAL
Status: COMPLETED | OUTPATIENT
Start: 2023-01-04 | End: 2023-01-04

## 2023-01-04 RX ADMIN — ACETAMINOPHEN 1000 MG: 500 TABLET ORAL at 08:01

## 2023-01-05 NOTE — ED PROVIDER NOTES
Encounter Date: 1/4/2023    SCRIBE #1 NOTE: I, Bekah Wolff, am scribing for, and in the presence of,  Trey Scherer MD. I have scribed the following portions of the note - Other sections scribed: HPI.     History     Chief Complaint   Patient presents with    Chest Pain     Onset today, denies cardiac hx   Time patient was seen by the provider: 8:15 PM      The patient is a 57 y.o. female with a past medical history of fibromyalgia, hypomagnesemia, DVT, GERD, HTN who presents to the ED with a complaint of cental chest pain. The patient states that she has been experiencing central chest pain starting around 5:30 pm today, which has been persistent. The patient rates her pain a 9 on a pain scale of 1-10. She affirms radiation of pain into her left arm, back and neck. She also reports associated shortness of breath, chills, a cough which developed a few weeks ago and fever (subjective). She denies trying OTC medication before arrival to help alleviate her symptoms. No other exacerbating or alleviating factors. Patient denies emesis, diarrhea, newly developed leg swelling, newly developed abdominal pain or other associated symptoms. Patient reports experiencing similar symptoms a couple years ago. Denies hx of MI. Denies taking anticoagulants. The patient currently nephrostomy bag and colostomy bag in place, with normal output.     The history is provided by the patient and medical records. No  was used.   Review of patient's allergies indicates:   Allergen Reactions    Bee sting [allergen ext-venom-honey bee]      Rash      Grass pollen-bermuda, standard      rash     Past Medical History:   Diagnosis Date    Abnormal mammogram 08/25/2020    Acute blood loss anemia     Acute deep vein thrombosis (DVT) of lower extremity 12/09/2020    Advance care planning 04/30/2021    Anemia due to chronic blood loss     Anemia due to chronic kidney disease     Anxiety     Cardiovascular event risk -- low  09/14/2015    ASCVD 10-year risk 1.9% (with optimal risk factors 1.3%) as of 9/14/2015     Cervical cancer 2014    Chronic back pain     Colostomy care     Deep vein thrombosis     Depression     Diarrhea due to malabsorption 11/14/2018    Diarrhea due to malabsorption 11/14/2018    Difficult intubation     Disorder of kidney and ureter     DVT of lower extremity, bilateral 11/04/2020    Fibromyalgia     Fungemia 09/27/2020    Generalized abdominal pain 08/25/2020    GERD (gastroesophageal reflux disease)     Hemifacial spasm 09/16/2015    Hiatal hernia 2014    History of cervical cancer 10/11/2018    Hx of psychiatric care     Cymbalta, trazodone    Hypertension     Hypomagnesemia 11/21/2018    Lactose intolerance     Metastatic squamous cell carcinoma to lymph node 10/11/2018    Nephrostomy tube displaced     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 drug 11/14/2018    Osteoarthritis of back     Peritonitis 09/22/2020    Pseudomonas urinary tract infection 04/21/2021    Psychiatric problem     Refusal of blood transfusions as patient is [employer]     Schatzki's ring 09/14/2015    Seen on outside EGD 05/2014, underwent esophageal dilatation. Bx were negative.     Seizures     Sleep stage dysfunction     Noted on PSG 06/2017; negative for obstructive sleep apnea     Stroke     Urinary tract infection associated with nephrostomy catheter 06/11/2020    Wound infection after surgery 09/24/2020     Past Surgical History:   Procedure Laterality Date    ANTEGRADE NEPHROSTOGRAPHY Bilateral 9/28/2020    Procedure: Nephrostogram - antegrade;  Surgeon: Leslie Balbuena MD;  Location: [address];  Service: Urology;  Laterality: Bilateral;    ANTEGRADE NEPHROSTOGRAPHY Left 4/20/2021    Procedure: NEPHROSTOGRAM, ANTEGRADE;  Surgeon: Leslei Balbuena MD;  Location: [address];  Service: Urology;  Laterality: Left;    ANTEGRADE NEPHROSTOGRAPHY Right 10/27/2022    Procedure: NEPHROSTOGRAM, ANTEGRADE;  Surgeon: Chirag  VIVIANA Russ MD;  [facility];  Service: Urology;  Laterality: Right;    BILATERAL OOPHORECTOMY  2015    CHOLECYSTECTOMY  11/09/2016    Done at [facility], path showed chronic cholecystitis and gallstones    cold knife conization  2014    COLECTOMY, RIGHT  9/17/2020    Procedure: COLECTOMY, RIGHT Extended;  Surgeon: Hammad Reynolds MD;  Location: [facility] OR 2ND FLR;  Service: Colon and Rectal;;    COLONOSCOPY  2014    COLONOSCOPY N/A 10/24/2016    at [facility], Dr Gutiérrez    COLONOSCOPY N/A 5/18/2018    Procedure: COLONOSCOPY;  Surgeon: Arden Gutiérrez MD;  Location: [facility] ENDO (4TH FLR);  Service: Endoscopy;  Laterality: N/A;    COLONOSCOPY N/A 7/28/2020    Procedure: COLONOSCOPY;  Surgeon: Hammad Reynolds MD;  Location: [facility] (4TH FLR);  Service: Colon and Rectal;  Laterality: N/A;  covid test [place] 7/25    CYSTOSCOPY WITH URETEROSCOPY, RETROGRADE PYELOGRAPHY, AND INSERTION OF STENT Bilateral 3/21/2020    Procedure: CYSTOSCOPY, WITH RETROGRADE PYELOGRAM,;  Surgeon: Leslie Balbuena MD;  Location: [facility] OR 1ST FLR;  Service: Urology;  Laterality: Bilateral;    DILATION OF NEPHROSTOMY TRACT Right 10/27/2022    Procedure: DILATION, NEPHROSTOMY TRACT;  Surgeon: Chirag Russ MD;  [facility];  Service: Urology;  Laterality: Right;    ESOPHAGOGASTRODUODENOSCOPY  2014    ESOPHAGOGASTRODUODENOSCOPY  11/18/2020    ESOPHAGOGASTRODUODENOSCOPY N/A 11/18/2020    Procedure: ESOPHAGOGASTRODUODENOSCOPY (EGD);  Surgeon: Zenon Spencer MD;  Location: [facility];  Service: Endoscopy;  Laterality: N/A;    ESOPHAGOGASTRODUODENOSCOPY N/A 12/11/2020    Procedure: EGD (ESOPHAGOGASTRODUODENOSCOPY);  Surgeon: Juancho Muse MD;  Location: [facility] ENDO (2ND FLR);  Service: Endoscopy;  Laterality: N/A;    HYSTERECTOMY  2014    [facility] for cervical cancer    ILEOSTOMY  9/17/2020    Procedure: CREATION, ILEOSTOMY;  Surgeon: Hammad Reynolds MD;  Location: [facility] OR 2ND FLR;  Service: Colon and Rectal;;    LOOPOGRAM  N/A 4/20/2021    Procedure: LOOPOGRAM;  Surgeon: Leslie Balbuena MD;  Location: [facility] OR 1ST FLR;  Service: Urology;  Laterality: N/A;    MOBILIZATION OF SPLENIC FLEXURE N/A 9/11/2020    Procedure: MOBILIZATION, COLONIC;  Surgeon: Hammad Reynolds MD;  Location: [facility] OR 2ND FLR;  Service: Colon and Rectal;  Laterality: N/A;    NEPHROSTOGRAPHY Bilateral 4/17/2021    Procedure: Nephrostogram;  Surgeon: Celeste Surgeon;  Location: [facility];  Service: Anesthesiology;  Laterality: Bilateral;    OOPHORECTOMY      PYELOSCOPY Right 10/27/2022    Procedure: PYELOSCOPY;  Surgeon: Chirag Russ MD;  Location: [facility] OR 1ST FLR;  Service: Urology;  Laterality: Right;    REPLACEMENT OF NEPHROSTOMY TUBE Right 10/27/2022    Procedure: REPLACEMENT, NEPHROSTOMY TUBE;  Surgeon: Chirag Russ MD;  [facility];  Service: Urology;  Laterality: Right;    RETROPERITONEAL LYMPHADENECTOMY Right 9/17/2018    Procedure: LYMPHADENECTOMY, RETROPERITONEUM;  Surgeon: Sebas Reed MD;  Location: [facility] OR 2ND FLR;  Service: General;  Laterality: Right;  ILIAC    URETEROSCOPIC REMOVAL OF URETERIC CALCULUS Right 10/27/2022    Procedure: REMOVAL, CALCULUS, URETER, URETEROSCOPIC;  Surgeon: Chirag Russ MD;  [facility];  Service: Urology;  Laterality: Right;    URETEROSCOPY Right 10/27/2022    Procedure: URETEROSCOPY-ANTEGRADE;  Surgeon: Chirag Russ MD;  [facility];  Service: Urology;  Laterality: Right;     Family History   Problem Relation Age of Onset    Heart disease Sister     Heart disease Maternal Grandmother     Colon cancer Father     Esophageal cancer Father     Cancer Father         Lung-smoker    Cancer Mother         Cervical    Cervical cancer Mother     Breast cancer Maternal Aunt     Suicide Daughter         jumped from parking structure    Drug abuse Daughter     Drug abuse Daughter     Rectal cancer Neg Hx     Stomach cancer Neg Hx     Crohn's disease Neg Hx      Ulcerative colitis Neg Hx     Diabetes Neg Hx     Hypertension Neg Hx      Social History     Tobacco Use    Smoking status: Never    Smokeless tobacco: Never   Substance Use Topics    Alcohol use: No     Alcohol/week: 0.0 standard drinks    Drug use: No     Review of Systems    Physical Exam     Initial Vitals [01/04/23 1818]   BP Pulse Resp Temp SpO2   99/64 88 18 98.2 °F (36.8 °C) 100 %      MAP       --         Physical Exam    Nursing note and vitals reviewed.      Physical Exam:  CONSTITUTIONAL: Well developed, well nourished, in no acute distress.  HENT: Normocephalic, atraumatic    EYES: Sclerae anicteric   NECK: Supple, no thyroid enlargement  CARDIOVASCULAR: Regular rate and rhythm without any murmurs, gallops, rubs.  RESPIRATORY: Speaking in full sentences. Breathing comfortably. Auscultation of the lungs revealed normal breath sounds b/l, no wheezing, no rales, no rhonchi.  ABDOMEN: Soft and nontender, no masses, no rebound or guarding, ostomy and urostomy outputting normally.  Scarred abdomen.  NEUROLOGIC: Alert, interacting normally. No facial droop.   MSK:  There is some reproducible chest wall tenderness to palpation across the chest wall.  No redness or swelling.  Moving all four extremities.  Skin: Warm and dry. No visible rash on exposed areas of skin.    Psych:  Flat affect.    ED Course   Procedures  Labs Reviewed   CBC W/ AUTO DIFFERENTIAL - Abnormal; Notable for the following components:       Result Value    MCHC 30.5 (*)     RDW 14.8 (*)     Lymph % 16.8 (*)     All other components within normal limits   COMPREHENSIVE METABOLIC PANEL - Abnormal; Notable for the following components:    Chloride 111 (*)     CO2 19 (*)     Creatinine 1.5 (*)     Alkaline Phosphatase 153 (*)     eGFR 39.9 (*)     All other components within normal limits   TROPONIN I   TROPONIN I   B-TYPE NATRIURETIC PEPTIDE   SARS-COV2 (COVID) WITH FLU/RSV BY PCR        ECG Results              EKG 12-lead (Edited Result -  "FINAL)  Result time 01/05/23 09:33:21      Edited Result - FINAL by Interface, Lab In [facility] (01/05/23 09:33:21)                   Narrative:    Test Reason : R07.9,    Vent. Rate : 086 BPM     Atrial Rate : 086 BPM     P-R Int : 146 ms          QRS Dur : 080 ms      QT Int : 364 ms       P-R-T Axes : 088 227 081 degrees     QTc Int : 435 ms    Normal sinus rhythm  Right atrial enlargement  Right superior axis deviation  Abnormal ECG  When compared with ECG of 14-OCT-2022 11:12,  Questionable change in The axis  Reconfirmed by Chirag Pereira MD ([id number]) on 1/5/2023 9:33:12 AM    Referred By: [email or web address]   SELF           Confirmed By:Chirag Pereira MD                                  Imaging Results              X-Ray Chest PA And Lateral (Final result)  Result time 01/04/23 21:58:35      Final result by Sebas Bermeo MD (01/04/23 21:58:35)                   Impression:      No acute cardiopulmonary finding.      Electronically signed by: Sebas Bermeo MD  Date:    01/04/2023  Time:    21:58               Narrative:    EXAMINATION:  XR CHEST PA AND LATERAL    CLINICAL HISTORY:  Provided history is "Chest Pain;  ".    TECHNIQUE:  Frontal and lateral views of the chest were performed.    COMPARISON:  09/13/2021 and 08/30/2021.    FINDINGS:  Cardiac silhouette is not enlarged. No focal consolidation.  No sizable pleural effusion.  No pneumothorax.                                       Medications   acetaminophen tablet 1,000 mg (1,000 mg Oral Given 1/4/23 2035)     Medical Decision Making:   History:   Old Medical Records: I decided to obtain old medical records.  Old Records Summarized: other records.       <> Summary of Records: She does not appear to be on anticoagulants per the chart.  Independently Interpreted Test(s):   I have ordered and independently interpreted X-rays - see prior notes.  I have ordered and independently interpreted EKG Reading(s) - see prior notes  Clinical Tests:   Lab Tests: Ordered and " Reviewed  Radiological Study: Ordered and Reviewed  Medical Tests: Ordered and Reviewed     57-year-old female with past medical history as noted coming in with chest pain starting today.  No abdominal pain symptoms are somewhat nonspecific and on exam heart, lung findings are unremarkable and she has some reproducible chest wall tenderness.    Given age and risk factors will workup and risk stratify for ACS, pneumonia/structural pulmonary abnormalities, viral in etiology upper chest pain and cough, fluid overload, metabolic hematologic abnormalities, arrhythmia.    Risk stratify for above etiologies with EKG, labs, chest x-ray, viral swabs.  Will give Tylenol for pain control given her reproducible chest wall tenderness.  Two troponins given recent onset of chest pain.    Disposition based on ED workup patient's symptomatology.       Scribe Attestation:   Scribe #1: I performed the above scribed service and the documentation accurately describes the services I performed. I attest to the accuracy of the note.    Attending Attestation:           Physician Attestation for Scribe:  Physician Attestation Statement for Scribe #1: I, reviewed documentation, as scribed by Bekah Wolff in my presence, and it is both accurate and complete.           ED Course as of 01/06/23 1305   Wed Jan 04, 2023   2109 EKG, independently interpreted, normal sinus rhythm at a rate of 86 with rightward axis, some lateral ST inversions in aVL but otherwise no significant ST abnormalities intervals are not remarkable. [BA]      ED Course User Index  [BA] Trey Scherer MD                 Update:10:58 PM  She remains hemodynamically stable and well-appearing emergency department.    Blood pressure  compare to previous pressures on outside chart review is similar to previous baselines.  She says the pain is significantly improved with Tylenol.  Chest x-ray is unremarkable.    At this time not consistent with acutely dangerous pathology.   Possible MSK pain?  Discharge with supportive care and outpatient follow up, ED return precautions.    Findings of ED work up explained to patient. Patient agrees with discharge plan and verbalizes understanding of return precautions.       Clinical Impression:   Final diagnoses:  [R07.9] Chest pain        ED Disposition Condition    Discharge Stable          ED Prescriptions       Medication Sig Dispense Start Date End Date Auth. Provider    acetaminophen (TYLENOL) 325 MG tablet Take 2 tablets (650 mg total) by mouth every 6 (six) hours as needed for Pain. 30 tablet 1/4/2023 -- Trey Scherer MD          Follow-up Information       Follow up With Specialties Details Why Contact Info    Primary care doctor                 Trey Scherer MD  01/06/23 1580

## 2023-01-05 NOTE — FIRST PROVIDER EVALUATION
"Medical screening examination initiated.  I have conducted a focused provider triage encounter, findings are as follows:    Brief history of present illness:  CP x 1 hour, no heart history, +sob, +cough    Vitals:    01/04/23 1818   BP: 99/64   BP Location: Right arm   Patient Position: Sitting   Pulse: 88   Resp: 18   Temp: 98.2 °F (36.8 °C)   TempSrc: Oral   SpO2: 100%   Weight: 81.6 kg (180 lb)   Height: 5' 9" (1.753 m)       Pertinent physical exam:  chronically ill appearing, 2 nephrostomy tubes, 2 colostomy bags    Brief workup plan:  cp w/u    Preliminary workup initiated; this workup will be continued and followed by the physician or advanced practice provider that is assigned to the patient when roomed.  "

## 2023-01-05 NOTE — DISCHARGE INSTRUCTIONS
Your EKG, labs, x-ray scan did not show any signs of dangerous conditions.  At this time it is unclear what is causing your symptoms.    Please follow-up with your primary care doctor within 1 week.  If you are developing new, worsening worrisome symptoms please return to the emergency department for re-evaluation.    You can take Tylenol at home for minor symptoms.

## 2023-01-05 NOTE — ED TRIAGE NOTES
"Pt reports midsternal chest pain that radiates to left arm, neck and back that began around 17:30 pm. Pt reports doing "nothing" when pain started. Pt states she had this pain previously. Pt denies cardiac hx. Pt also reports chills, hot flashes and coughing.   "

## 2023-01-11 ENCOUNTER — TELEPHONE (OUTPATIENT)
Dept: INTERVENTIONAL RADIOLOGY/VASCULAR | Facility: HOSPITAL | Age: 58
End: 2023-01-11
Payer: MEDICAID

## 2023-01-12 ENCOUNTER — HOSPITAL ENCOUNTER (OUTPATIENT)
Dept: INTERVENTIONAL RADIOLOGY/VASCULAR | Facility: HOSPITAL | Age: 58
Discharge: HOME | End: 2023-01-12
Payer: MEDICAID

## 2023-01-12 VITALS
BODY MASS INDEX: 27.7 KG/M2 | DIASTOLIC BLOOD PRESSURE: 60 MMHG | TEMPERATURE: 97 F | RESPIRATION RATE: 16 BRPM | OXYGEN SATURATION: 100 % | HEIGHT: 69 IN | HEART RATE: 72 BPM | SYSTOLIC BLOOD PRESSURE: 96 MMHG | WEIGHT: 187 LBS

## 2023-01-12 DIAGNOSIS — N13.30 HYDRONEPHROSIS, UNSPECIFIED HYDRONEPHROSIS TYPE: ICD-10-CM

## 2023-01-12 LAB
INR PPP: 0.9 (ref 0.8–1.2)
PROTHROMBIN TIME: 9.8 SEC (ref 9–12.5)

## 2023-01-12 PROCEDURE — 25500020 PHARM REV CODE 255

## 2023-01-12 PROCEDURE — 25000003 PHARM REV CODE 250: Performed by: STUDENT IN AN ORGANIZED HEALTH CARE EDUCATION/TRAINING PROGRAM

## 2023-01-12 PROCEDURE — 63600175 PHARM REV CODE 636 W HCPCS: Performed by: STUDENT IN AN ORGANIZED HEALTH CARE EDUCATION/TRAINING PROGRAM

## 2023-01-12 PROCEDURE — 85610 PROTHROMBIN TIME: CPT

## 2023-01-12 PROCEDURE — 50435 IR NEPHROSTOMY TUBE CHANGE: ICD-10-PCS | Mod: 50,,, | Performed by: STUDENT IN AN ORGANIZED HEALTH CARE EDUCATION/TRAINING PROGRAM

## 2023-01-12 PROCEDURE — A4357 BEDSIDE DRAINAGE BAG: HCPCS

## 2023-01-12 PROCEDURE — A4550 SURGICAL TRAYS: HCPCS

## 2023-01-12 PROCEDURE — 50435 EXCHANGE NEPHROSTOMY CATH: CPT | Mod: 50 | Performed by: STUDENT IN AN ORGANIZED HEALTH CARE EDUCATION/TRAINING PROGRAM

## 2023-01-12 RX ORDER — LIDOCAINE HYDROCHLORIDE 10 MG/ML
1 INJECTION, SOLUTION EPIDURAL; INFILTRATION; INTRACAUDAL; PERINEURAL ONCE
Status: DISCONTINUED | OUTPATIENT
Start: 2023-01-12 | End: 2023-01-13 | Stop reason: HOSPADM

## 2023-01-12 RX ORDER — FENTANYL CITRATE 50 UG/ML
INJECTION, SOLUTION INTRAMUSCULAR; INTRAVENOUS
Status: COMPLETED | OUTPATIENT
Start: 2023-01-12 | End: 2023-01-12

## 2023-01-12 RX ORDER — MIDAZOLAM HYDROCHLORIDE 1 MG/ML
INJECTION INTRAMUSCULAR; INTRAVENOUS
Status: COMPLETED | OUTPATIENT
Start: 2023-01-12 | End: 2023-01-12

## 2023-01-12 RX ORDER — SODIUM CHLORIDE 9 MG/ML
75 INJECTION, SOLUTION INTRAVENOUS CONTINUOUS
Status: DISCONTINUED | OUTPATIENT
Start: 2023-01-12 | End: 2023-01-13 | Stop reason: HOSPADM

## 2023-01-12 RX ADMIN — IOHEXOL 20 ML: 300 INJECTION, SOLUTION INTRAVENOUS at 08:01

## 2023-01-12 RX ADMIN — MIDAZOLAM HYDROCHLORIDE 2 MG: 1 INJECTION INTRAMUSCULAR; INTRAVENOUS at 08:01

## 2023-01-12 RX ADMIN — CEFTRIAXONE SODIUM 1 G: 1 INJECTION, POWDER, FOR SOLUTION INTRAMUSCULAR; INTRAVENOUS at 08:01

## 2023-01-12 RX ADMIN — FENTANYL CITRATE 50 MCG: 50 INJECTION, SOLUTION INTRAMUSCULAR; INTRAVENOUS at 08:01

## 2023-01-12 NOTE — PLAN OF CARE
1hr IR recovery complete.  VSS. Dressings CDI. IV removed. Discharge instructions reviewed and given. Pt awaiting transport via wheelchair to , [facility] and private vehicle.

## 2023-01-12 NOTE — PLAN OF CARE
Pt ambulated on unit by herself.  Pt states that her  will pick her up later today.  Verbalized an understanding of procedure.  Oriented to room and call bell provided.  Nephrostomy tubes in place bilaterally from pt mid back.  Clear yellow urine noted to nephrostomy bags.  Dressings securing nephrostomy tubes are not intact.  No redness or swelling noted to nephrostomy tubes.  Bilateral colostomy bags noted to bilateral abdomen.  No stool noted to colostomy bags.  Pt c/o pain to bilateral back from nephrostomy tubes of a 5 on a scale of 0-10.  Will continue to monitor.

## 2023-01-12 NOTE — H&P
Vascular and Interventional Radiology   History & Physical    Date:  1/12/2023      Chief Complaint:   Hydronephrosis    History of Present Illness:  Edita Riley is a 57 y.o. female who presents for nephrostomy tube check and possible exchange    Past Medical History:  Past Medical History:   Diagnosis Date    Abnormal mammogram 08/25/2020    Acute blood loss anemia     Acute deep vein thrombosis (DVT) of lower extremity 12/09/2020    Advance care planning 04/30/2021    Anemia due to chronic blood loss     Anemia due to chronic kidney disease     Anxiety     Cardiovascular event risk -- low 09/14/2015    ASCVD 10-year risk 1.9% (with optimal risk factors 1.3%) as of 9/14/2015     Cervical cancer 2014    Chronic back pain     Colostomy care     Deep vein thrombosis     Depression     Diarrhea due to malabsorption 11/14/2018    Diarrhea due to malabsorption 11/14/2018    Difficult intubation     Disorder of kidney and ureter     DVT of lower extremity, bilateral 11/04/2020    Fibromyalgia     Fungemia 09/27/2020    Generalized abdominal pain 08/25/2020    GERD (gastroesophageal reflux disease)     Hemifacial spasm 09/16/2015    Hiatal hernia 2014    History of cervical cancer 10/11/2018    Hx of psychiatric care     Cymbalta, trazodone    Hypertension     Hypomagnesemia 11/21/2018    Lactose intolerance     Metastatic squamous cell carcinoma to lymph node 10/11/2018    Nephrostomy tube displaced     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 drug 11/14/2018    Osteoarthritis of back     Peritonitis 09/22/2020    Pseudomonas urinary tract infection 04/21/2021    Psychiatric problem     Refusal of blood transfusions as patient is [place]anda[place]     Schatzki's ring 09/14/2015    Seen on outside EGD 05/2014, underwent esophageal dilatation. Bx were negative.     Seizures     Sleep stage dysfunction     Noted on PSG 06/2017; negative for obstructive sleep apnea     Stroke     Urinary tract infection associated  with nephrostomy catheter 06/11/2020    Wound infection after surgery 09/24/2020       Past Surgical History:  Past Surgical History:   Procedure Laterality Date    ANTEGRADE NEPHROSTOGRAPHY Bilateral 9/28/2020    Procedure: Nephrostogram - antegrade;  Surgeon: Leslie Balbuena MD;  Location: [facility] OR 1ST FLR;  Service: Urology;  Laterality: Bilateral;    ANTEGRADE NEPHROSTOGRAPHY Left 4/20/2021    Procedure: NEPHROSTOGRAM, ANTEGRADE;  Surgeon: Leslie Balbuena MD;  Location: [facility] OR 1ST FLR;  Service: Urology;  Laterality: Left;    ANTEGRADE NEPHROSTOGRAPHY Right 10/27/2022    Procedure: NEPHROSTOGRAM, ANTEGRADE;  Surgeon: Chirag Russ MD;  [facility];  Service: Urology;  Laterality: Right;    BILATERAL OOPHORECTOMY  2015    CHOLECYSTECTOMY  11/09/2016    Done at [facility], path showed chronic cholecystitis and gallstones    cold knife conization  2014    COLECTOMY, RIGHT  9/17/2020    Procedure: COLECTOMY, RIGHT Extended;  Surgeon: Hammad Reynolds MD;  Location: [facility] OR 2ND FLR;  Service: Colon and Rectal;;    COLONOSCOPY  2014    COLONOSCOPY N/A 10/24/2016    at [facility], Dr Gutiérrez    COLONOSCOPY N/A 5/18/2018    Procedure: COLONOSCOPY;  Surgeon: Arden Gutiérrez MD;  Location: [facility] (4TH FLR);  Service: Endoscopy;  Laterality: N/A;    COLONOSCOPY N/A 7/28/2020    Procedure: COLONOSCOPY;  Surgeon: Hammad Reynolds MD;  Location: [facility] ENDO (4TH FLR);  Service: Colon and Rectal;  Laterality: N/A;  [place] 7[place]    CYSTOSCOPY WITH URETEROSCOPY, RETROGRADE PYELOGRAPHY, AND INSERTION OF STENT Bilateral 3/21/2020    Procedure: CYSTOSCOPY, WITH RETROGRADE PYELOGRAM,;  Surgeon: Leslie Balbuena MD;  Location: [facility] OR 1ST FLR;  Service: Urology;  Laterality: Bilateral;    DILATION OF NEPHROSTOMY TRACT Right 10/27/2022    Procedure: DILATION, NEPHROSTOMY TRACT;  Surgeon: Chirag Russ MD;  [facility];  Service: Urology;  Laterality: Right;    ESOPHAGOGASTRODUODENOSCOPY   2014    ESOPHAGOGASTRODUODENOSCOPY  11/18/2020    ESOPHAGOGASTRODUODENOSCOPY N/A 11/18/2020    Procedure: ESOPHAGOGASTRODUODENOSCOPY (EGD);  Surgeon: Zenon Spencer MD;  Location: [facility];  Service: Endoscopy;  Laterality: N/A;    ESOPHAGOGASTRODUODENOSCOPY N/A 12/11/2020    Procedure: EGD (ESOPHAGOGASTRODUODENOSCOPY);  Surgeon: Juancho Muse MD;  Location: [facility] (2ND FLR);  Service: Endoscopy;  Laterality: N/A;    HYSTERECTOMY  2014    [facility] for cervical cancer    ILEOSTOMY  9/17/2020    Procedure: CREATION, ILEOSTOMY;  Surgeon: Hammad Reynolds MD;  Location: [facility] OR 2ND FLR;  Service: Colon and Rectal;;    LOOPOGRAM N/A 4/20/2021    Procedure: LOOPOGRAM;  Surgeon: Leslie Balbuena MD;  Location: [facility] OR 1ST FLR;  Service: Urology;  Laterality: N/A;    MOBILIZATION OF SPLENIC FLEXURE N/A 9/11/2020    Procedure: MOBILIZATION, COLONIC;  Surgeon: Hammad Reynolds MD;  Location: [facility] OR 2ND FLR;  Service: Colon and Rectal;  Laterality: N/A;    NEPHROSTOGRAPHY Bilateral 4/17/2021    Procedure: Nephrostogram;  Surgeon: Celeste Surgeon;  Location: [facility];  Service: Anesthesiology;  Laterality: Bilateral;    OOPHORECTOMY      PYELOSCOPY Right 10/27/2022    Procedure: PYELOSCOPY;  Surgeon: Chirag Russ MD;  [facility];  Service: Urology;  Laterality: Right;    REPLACEMENT OF NEPHROSTOMY TUBE Right 10/27/2022    Procedure: REPLACEMENT, NEPHROSTOMY TUBE;  Surgeon: Chirag Russ MD;  Location: [facility] OR 1ST FLR;  Service: Urology;  Laterality: Right;    RETROPERITONEAL LYMPHADENECTOMY Right 9/17/2018    Procedure: LYMPHADENECTOMY, RETROPERITONEUM;  Surgeon: Sebas Reed MD;  Location: [facility] OR 2ND FLR;  Service: General;  Laterality: Right;  ILIAC    URETEROSCOPIC REMOVAL OF URETERIC CALCULUS Right 10/27/2022    Procedure: REMOVAL, CALCULUS, URETER, URETEROSCOPIC;  Surgeon: Chirag Russ MD;  Location: [facility] OR 1ST FLR;  Service: Urology;  Laterality: Right;    URETEROSCOPY Right  10/27/2022    Procedure: URETEROSCOPY-ANTEGRADE;  Surgeon: Cihrag Russ MD;  Location: [facility] OR [address] [place] [address];  Service: Urology;  Laterality: Right;        Sedation History:    Denies any adverse reactions.  Denies problems laying flat.    Social History:  Social History     Tobacco Use    Smoking status: Never    Smokeless tobacco: Never   Substance Use Topics    Alcohol use: No     Alcohol/week: 0.0 standard drinks    Drug use: No        Home Medications:   Prior to Admission medications    Medication Sig Start Date End Date Taking? Authorizing Provider   acetaminophen (TYLENOL) 325 MG tablet Take 2 tablets (650 mg total) by mouth every 6 (six) hours as needed for Pain. 1/4/23  Yes Trey Scherer MD   gabapentin (NEURONTIN) 100 MG capsule Take 2 capsules (200 mg total) by mouth every evening. 9/1/22  Yes Danuta Barboza MD   ketorolac (TORADOL) 10 mg tablet Take 1 tablet (10 mg total) by mouth every 6 (six) hours as needed for Pain. 9/8/22  Yes Denise Brooks NP   melatonin (MELATIN) 3 mg tablet Take 2 tablets (6 mg total) by mouth nightly as needed for Insomnia. 9/6/21  Yes Kacey Bergeron MD   mirtazapine (REMERON SOL-TAB) 15 MG disintegrating tablet Dissolve 1 tablet (15 mg total) by mouth nightly. 2/15/22 2/15/23 Yes Diony Ram MD   sodium bicarbonate 650 MG tablet Take 2 tablets (1,300 mg total) by mouth 2 (two) times daily. 2/15/22 2/15/23 Yes Diony Ram MD   sucralfate (CARAFATE) 1 gram tablet Take 1 tablet (1 g total) by mouth 4 (four) times daily before meals and nightly. 9/1/22  Yes Danuta Barboza MD   traMADoL (ULTRAM) 50 mg tablet Take 1 tablet (50 mg total) by mouth every 6 (six) hours. 10/27/22  Yes Karina Beverly MD       Inpatient Medications:    Current Outpatient Medications:     acetaminophen (TYLENOL) 325 MG tablet, Take 2 tablets (650 mg total) by mouth every 6 (six) hours as needed for Pain., Disp: 30 tablet, Rfl: 0    gabapentin (NEURONTIN) 100 MG  capsule, Take 2 capsules (200 mg total) by mouth every evening., Disp: 90 capsule, Rfl: 3    ketorolac (TORADOL) 10 mg tablet, Take 1 tablet (10 mg total) by mouth every 6 (six) hours as needed for Pain., Disp: 30 tablet, Rfl: 0    melatonin (MELATIN) 3 mg tablet, Take 2 tablets (6 mg total) by mouth nightly as needed for Insomnia., Disp: 60 tablet, Rfl: 0    mirtazapine (REMERON SOL-TAB) 15 MG disintegrating tablet, Dissolve 1 tablet (15 mg total) by mouth nightly., Disp: 30 tablet, Rfl: 11    sodium bicarbonate 650 MG tablet, Take 2 tablets (1,300 mg total) by mouth 2 (two) times daily., Disp: 120 tablet, Rfl: 11    sucralfate (CARAFATE) 1 gram tablet, Take 1 tablet (1 g total) by mouth 4 (four) times daily before meals and nightly., Disp: 120 tablet, Rfl: 2    traMADoL (ULTRAM) 50 mg tablet, Take 1 tablet (50 mg total) by mouth every 6 (six) hours., Disp: 5 tablet, Rfl: 0    Current Facility-Administered Medications:     0.9%  NaCl infusion, 75 mL/hr, Intravenous, Continuous, Leah Barajas PA-C    LIDOcaine (PF) 10 mg/ml (1%) injection 10 mg, 1 mL, Other, Once, Leah Barajas PA-C     Anticoagulants/Antiplatelets:   no anticoagulation    Allergies:   Review of patient's allergies indicates:   Allergen Reactions    Bee sting [allergen ext-venom-honey bee]      Rash      Grass pollen-bermuda, standard      rash       Review of Systems:   As documented in primary provider H&P.    Vital Signs (Most Recent):  Temp: 97.9 °F (36.6 °C) (01/12/23 0655)  Pulse: 78 (01/12/23 0655)  Resp: 17 (01/12/23 0655)  BP: (!) 122/56 (01/12/23 0706)  SpO2: 98 % (01/12/23 0655)    Physical Exam:  No acute distress, laying comfortably in bed, pleasant and cooperative  Regular rate and rhythm  Breathing unlabored  Abdomen benign  Extremities warm and well perfused    Sedation Exam:  ASA: III - Patient appears to have severe systemic disease not posing a constant threat to life   Mallampati: II (hard and soft palate, upper portion of  tonsils anduvula visible)     Laboratory:  Lab Results   Component Value Date    INR 0.9 01/12/2023       Lab Results   Component Value Date    WBC 10.20 01/04/2023    HGB 12.6 01/04/2023    HCT 41.3 01/04/2023    MCV 89 01/04/2023     01/04/2023      Lab Results   Component Value Date    GLU 92 01/04/2023     01/04/2023    K 4.9 01/04/2023     (H) 01/04/2023    CO2 19 (L) 01/04/2023    BUN 20 01/04/2023    CREATININE 1.5 (H) 01/04/2023    CALCIUM 9.7 01/04/2023    MG 1.9 09/01/2022    ALT 14 01/04/2023    AST 18 01/04/2023    ALBUMIN 3.6 01/04/2023    BILITOT 0.2 01/04/2023    BILIDIR 0.2 12/04/2020       Imaging:  Reviewed.      ASSESSMENT/PLAN:                       Sedation Plan: Up to moderate  Patient will undergo: Image-guided nephrostomy tube check, possible exchange    Lambert Shea M.D.  Resident, Diagnostic and Interventional Radiology  Department of Radiology  [facility]

## 2023-01-12 NOTE — DISCHARGE INSTRUCTIONS
[facility] [phone] (MON-FRI 8 AM- 5PM). Radiology Resident on call [phone].

## 2023-01-12 NOTE — PROCEDURES
"  Pre Op Diagnosis: Chronic ureteral obstruction  Post Op Diagnosis: Same    Procedure: bilateral nephrostomy tube exchanges    Procedure performed by: Silav    Written Informed Consent Obtained: Yes  Specimen Removed: YES existng neph tubes.  Estimated Blood Loss: Minimal    Findings:   Successful exchange of bilateral 12 Fr nephrostomy tubes. Persistent ureteral obstruction noted.     Patient tolerated procedure well. Repeat exchange in 6 weeks.      Refugio Ascencio MD ([place])  Interventional Radiology  [phone]      "

## 2023-01-13 DIAGNOSIS — T83.512D URINARY TRACT INFECTION ASSOCIATED WITH NEPHROSTOMY CATHETER, SUBSEQUENT ENCOUNTER: ICD-10-CM

## 2023-01-13 DIAGNOSIS — N13.5 OBSTRUCTION OF URETER, UNSPECIFIED LATERALITY: Primary | ICD-10-CM

## 2023-01-13 DIAGNOSIS — N39.0 URINARY TRACT INFECTION ASSOCIATED WITH NEPHROSTOMY CATHETER, SUBSEQUENT ENCOUNTER: ICD-10-CM

## 2023-01-13 DIAGNOSIS — N13.30 HYDRONEPHROSIS, UNSPECIFIED HYDRONEPHROSIS TYPE: ICD-10-CM

## 2023-01-19 ENCOUNTER — TELEPHONE (OUTPATIENT)
Dept: UROLOGY | Facility: CLINIC | Age: 58
End: 2023-01-19
Payer: MEDICAID

## 2023-01-19 NOTE — TELEPHONE ENCOUNTER
----- Message from Olive Seth sent at 1/19/2023  8:23 AM CST -----  Edita Riley calling regarding Appointment Access  (message) for F/U to have tubes removed, call back [phone]

## 2023-02-06 ENCOUNTER — TELEPHONE (OUTPATIENT)
Dept: WOUND CARE | Facility: CLINIC | Age: 58
End: 2023-02-06
Payer: MEDICAID

## 2023-02-06 NOTE — TELEPHONE ENCOUNTER
Returned pt's call. States she has spoken with Kina in mean time and was sent some bags. Offered clinic appt with Paulina later this week incase bags were not adhering to skin. She declined and states she will call back if she needs an appt.

## 2023-02-13 ENCOUNTER — HOSPITAL ENCOUNTER (INPATIENT)
Facility: HOSPITAL | Age: 58
LOS: 5 days | Discharge: HOME | DRG: 698 | End: 2023-02-18
Attending: EMERGENCY MEDICINE | Admitting: HOSPITALIST
Payer: MEDICAID

## 2023-02-13 DIAGNOSIS — T83.022A NEPHROSTOMY TUBE DISPLACED: Primary | ICD-10-CM

## 2023-02-13 DIAGNOSIS — G43.009 MIGRAINE WITHOUT AURA AND WITHOUT STATUS MIGRAINOSUS, NOT INTRACTABLE: ICD-10-CM

## 2023-02-13 DIAGNOSIS — R07.9 CHEST PAIN: ICD-10-CM

## 2023-02-13 LAB
BASOPHILS # BLD AUTO: 0.05 K/UL (ref 0–0.2)
BASOPHILS NFR BLD: 0.5 % (ref 0–1.9)
BUN SERPL-MCNC: 28 MG/DL (ref 6–30)
CHLORIDE SERPL-SCNC: 109 MMOL/L (ref 95–110)
CREAT SERPL-MCNC: 1.5 MG/DL (ref 0.5–1.4)
DIFFERENTIAL METHOD: ABNORMAL
EOSINOPHIL # BLD AUTO: 0.2 K/UL (ref 0–0.5)
EOSINOPHIL NFR BLD: 2.3 % (ref 0–8)
ERYTHROCYTE [DISTWIDTH] IN BLOOD BY AUTOMATED COUNT: 14.6 % (ref 11.5–14.5)
GLUCOSE SERPL-MCNC: 95 MG/DL (ref 70–110)
HCT VFR BLD AUTO: 47.3 % (ref 37–48.5)
HCT VFR BLD CALC: 46 %PCV (ref 36–54)
HCV AB SERPL QL IA: NORMAL
HGB BLD-MCNC: 13.9 G/DL (ref 12–16)
HIV 1+2 AB+HIV1 P24 AG SERPL QL IA: NORMAL
IMM GRANULOCYTES # BLD AUTO: 0.02 K/UL (ref 0–0.04)
IMM GRANULOCYTES NFR BLD AUTO: 0.2 % (ref 0–0.5)
LYMPHOCYTES # BLD AUTO: 2.1 K/UL (ref 1–4.8)
LYMPHOCYTES NFR BLD: 22.2 % (ref 18–48)
MCH RBC QN AUTO: 26.7 PG (ref 27–31)
MCHC RBC AUTO-ENTMCNC: 29.4 G/DL (ref 32–36)
MCV RBC AUTO: 91 FL (ref 82–98)
MONOCYTES # BLD AUTO: 1.2 K/UL (ref 0.3–1)
MONOCYTES NFR BLD: 12.2 % (ref 4–15)
NEUTROPHILS # BLD AUTO: 5.9 K/UL (ref 1.8–7.7)
NEUTROPHILS NFR BLD: 62.6 % (ref 38–73)
NRBC BLD-RTO: 0 /100 WBC
PLATELET # BLD AUTO: 287 K/UL (ref 150–450)
PMV BLD AUTO: 10.9 FL (ref 9.2–12.9)
POC IONIZED CALCIUM: 1.22 MMOL/L (ref 1.06–1.42)
POC TCO2 (MEASURED): 24 MMOL/L (ref 23–29)
POTASSIUM BLD-SCNC: 4.7 MMOL/L (ref 3.5–5.1)
RBC # BLD AUTO: 5.2 M/UL (ref 4–5.4)
SAMPLE: ABNORMAL
SODIUM BLD-SCNC: 139 MMOL/L (ref 136–145)
WBC # BLD AUTO: 9.44 K/UL (ref 3.9–12.7)

## 2023-02-13 PROCEDURE — 99223 PR INITIAL HOSPITAL CARE,LEVL III: ICD-10-PCS | Mod: ,,, | Performed by: STUDENT IN AN ORGANIZED HEALTH CARE EDUCATION/TRAINING PROGRAM

## 2023-02-13 PROCEDURE — 25000003 PHARM REV CODE 250: Performed by: EMERGENCY MEDICINE

## 2023-02-13 PROCEDURE — 80047 BASIC METABLC PNL IONIZED CA: CPT

## 2023-02-13 PROCEDURE — 99223 1ST HOSP IP/OBS HIGH 75: CPT | Mod: ,,, | Performed by: STUDENT IN AN ORGANIZED HEALTH CARE EDUCATION/TRAINING PROGRAM

## 2023-02-13 PROCEDURE — 87389 HIV-1 AG W/HIV-1&-2 AB AG IA: CPT | Performed by: PHYSICIAN ASSISTANT

## 2023-02-13 PROCEDURE — 99235 PR OBSERV/HOSP SAME DATE,LEVL IV: ICD-10-PCS | Mod: ,,, | Performed by: PHYSICIAN ASSISTANT

## 2023-02-13 PROCEDURE — 86803 HEPATITIS C AB TEST: CPT | Performed by: PHYSICIAN ASSISTANT

## 2023-02-13 PROCEDURE — 99285 PR EMERGENCY DEPT VISIT,LEVEL V: ICD-10-PCS | Mod: ,,, | Performed by: EMERGENCY MEDICINE

## 2023-02-13 PROCEDURE — 99285 EMERGENCY DEPT VISIT HI MDM: CPT | Mod: 25

## 2023-02-13 PROCEDURE — 85025 COMPLETE CBC W/AUTO DIFF WBC: CPT | Performed by: EMERGENCY MEDICINE

## 2023-02-13 PROCEDURE — 99235 HOSP IP/OBS SAME DATE MOD 70: CPT | Mod: ,,, | Performed by: PHYSICIAN ASSISTANT

## 2023-02-13 PROCEDURE — 12000002 HC ACUTE/MED SURGE SEMI-PRIVATE ROOM

## 2023-02-13 PROCEDURE — G0378 HOSPITAL OBSERVATION PER HR: HCPCS

## 2023-02-13 PROCEDURE — 99285 EMERGENCY DEPT VISIT HI MDM: CPT | Mod: ,,, | Performed by: EMERGENCY MEDICINE

## 2023-02-13 RX ORDER — ONDANSETRON 2 MG/ML
4 INJECTION INTRAMUSCULAR; INTRAVENOUS EVERY 8 HOURS PRN
Status: DISCONTINUED | OUTPATIENT
Start: 2023-02-14 | End: 2023-02-18 | Stop reason: HOSPADM

## 2023-02-13 RX ORDER — ACETAMINOPHEN 500 MG
1000 TABLET ORAL
Status: COMPLETED | OUTPATIENT
Start: 2023-02-13 | End: 2023-02-13

## 2023-02-13 RX ORDER — HYDROCODONE BITARTRATE AND ACETAMINOPHEN 10; 325 MG/1; MG/1
1 TABLET ORAL EVERY 6 HOURS PRN
Status: DISCONTINUED | OUTPATIENT
Start: 2023-02-14 | End: 2023-02-18 | Stop reason: HOSPADM

## 2023-02-13 RX ORDER — IBUPROFEN 200 MG
24 TABLET ORAL
Status: DISCONTINUED | OUTPATIENT
Start: 2023-02-14 | End: 2023-02-18 | Stop reason: HOSPADM

## 2023-02-13 RX ORDER — HYDROCODONE BITARTRATE AND ACETAMINOPHEN 5; 325 MG/1; MG/1
1 TABLET ORAL EVERY 6 HOURS PRN
Status: DISCONTINUED | OUTPATIENT
Start: 2023-02-14 | End: 2023-02-18 | Stop reason: HOSPADM

## 2023-02-13 RX ORDER — PROMETHAZINE HYDROCHLORIDE 25 MG/1
25 TABLET ORAL EVERY 6 HOURS PRN
Status: DISCONTINUED | OUTPATIENT
Start: 2023-02-14 | End: 2023-02-18 | Stop reason: HOSPADM

## 2023-02-13 RX ORDER — GLUCAGON 1 MG
1 KIT INJECTION
Status: DISCONTINUED | OUTPATIENT
Start: 2023-02-14 | End: 2023-02-18 | Stop reason: HOSPADM

## 2023-02-13 RX ORDER — SODIUM CHLORIDE 0.9 % (FLUSH) 0.9 %
10 SYRINGE (ML) INJECTION EVERY 12 HOURS PRN
Status: DISCONTINUED | OUTPATIENT
Start: 2023-02-14 | End: 2023-02-18 | Stop reason: HOSPADM

## 2023-02-13 RX ORDER — TALC
6 POWDER (GRAM) TOPICAL NIGHTLY PRN
Status: DISCONTINUED | OUTPATIENT
Start: 2023-02-13 | End: 2023-02-18 | Stop reason: HOSPADM

## 2023-02-13 RX ORDER — NALOXONE HCL 0.4 MG/ML
0.02 VIAL (ML) INJECTION
Status: DISCONTINUED | OUTPATIENT
Start: 2023-02-14 | End: 2023-02-18 | Stop reason: HOSPADM

## 2023-02-13 RX ORDER — GABAPENTIN 100 MG/1
200 CAPSULE ORAL NIGHTLY
Status: DISCONTINUED | OUTPATIENT
Start: 2023-02-13 | End: 2023-02-18 | Stop reason: HOSPADM

## 2023-02-13 RX ORDER — MIRTAZAPINE 15 MG/1
15 TABLET, ORALLY DISINTEGRATING ORAL NIGHTLY
Status: DISCONTINUED | OUTPATIENT
Start: 2023-02-13 | End: 2023-02-14

## 2023-02-13 RX ORDER — SODIUM BICARBONATE 650 MG/1
1300 TABLET ORAL 2 TIMES DAILY
Status: DISCONTINUED | OUTPATIENT
Start: 2023-02-14 | End: 2023-02-18 | Stop reason: HOSPADM

## 2023-02-13 RX ORDER — IBUPROFEN 200 MG
16 TABLET ORAL
Status: DISCONTINUED | OUTPATIENT
Start: 2023-02-14 | End: 2023-02-18 | Stop reason: HOSPADM

## 2023-02-13 RX ORDER — SODIUM CHLORIDE 0.9 % (FLUSH) 0.9 %
10 SYRINGE (ML) INJECTION
Status: DISCONTINUED | OUTPATIENT
Start: 2023-02-13 | End: 2023-02-18 | Stop reason: HOSPADM

## 2023-02-13 RX ADMIN — ACETAMINOPHEN 1000 MG: 500 TABLET ORAL at 09:02

## 2023-02-13 NOTE — CONSULTS
Percutaneous Nephrostomy Tube Placement Consult Note  Interventional Radiology    Consult Requested By: Gissel Bryson MD   Reason for Consult: bilateral nephrostomy tubes dislodged     SUBJECTIVE:     Chief Complaint: bilateral nephrostomy tubes dislodged, request replacement.    History of Present Illness:  Edita Riley is a 58 y.o. female with a PMHx of bilateral PCN placement in 2020 for obstructive uropathy 2/2 pelvic XRT, hx cervical cancer, HTN, open transverse colon conduit urinary diversion on 9/11/2020 complicated by bowel perforation s/p take back for hemicolectomy and ileostomy creation 9/17/20 who was presents to the ED on 2/13/23 for bilateral PCN dislodgement.  Interventional Radiology has been consulted for bilateralPCN replacement for management of PCN dislodgement. Pt reports both her PCNs fell out around 2 pm when she was bending over to pick something up. She denies pain at the PCN sites and fever. She does note that she can feel some leakage from the sites. Pt does not have any recent imaging. Her PCNs were last exchanged on 1/12/23.  Her labs are currently pending. She does not take any blood thinners. She last ate around 10 am today. The pt is hemodynamically stable.     Review of Systems   Constitutional: Negative.    HENT: Negative.     Eyes: Negative.    Respiratory: Negative.     Cardiovascular: Negative.    Gastrointestinal: Negative.    Genitourinary: Negative.    Musculoskeletal: Negative.    Skin: Negative.    Neurological: Negative.      Scheduled Meds:  Continuous Infusions:  PRN Meds:    Review of patient's allergies indicates:   Allergen Reactions    Bee sting [allergen ext-venom-honey bee]      Rash      Grass pollen-bermuda, standard      rash       Past Medical History:   Diagnosis Date    Abnormal mammogram 08/25/2020    Acute blood loss anemia     Acute deep vein thrombosis (DVT) of lower extremity 12/09/2020    Advance care planning 04/30/2021    Anemia due  to chronic blood loss     Anemia due to chronic kidney disease     Anxiety     Cardiovascular event risk -- low 09/14/2015    ASCVD 10-year risk 1.9% (with optimal risk factors 1.3%) as of 9/14/2015     Cervical cancer 2014    Chronic back pain     Colostomy care     Deep vein thrombosis     Depression     Diarrhea due to malabsorption 11/14/2018    Diarrhea due to malabsorption 11/14/2018    Difficult intubation     Disorder of kidney and ureter     DVT of lower extremity, bilateral 11/04/2020    Fibromyalgia     Fungemia 09/27/2020    Generalized abdominal pain 08/25/2020    GERD (gastroesophageal reflux disease)     Hemifacial spasm 09/16/2015    Hiatal hernia 2014    History of cervical cancer 10/11/2018    Hx of psychiatric care     Cymbalta, trazodone    Hypertension     Hypomagnesemia 11/21/2018    Lactose intolerance     Metastatic squamous cell carcinoma to lymph node 10/11/2018    Nephrostomy tube displaced     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 drug 11/14/2018    Osteoarthritis of back     Peritonitis 09/22/2020    Pseudomonas urinary tract infection 04/21/2021    Psychiatric problem     Refusal of blood transfusions as patient is L[employer] day     Estephanieki's ring 09/14/2015    Seen on outside EGD 05/2014, underwent esophageal dilatation. Bx were negative.     Seizures     Sleep stage dysfunction     Noted on PSG 06/2017; negative for obstructive sleep apnea     Stroke     Urinary tract infection associated with nephrostomy catheter 06/11/2020    Wound infection after surgery 09/24/2020     Past Surgical History:   Procedure Laterality Date    ANTEGRADE NEPHROSTOGRAPHY Bilateral 9/28/2020    Procedure: Nephrostogram - antegrade;  Surgeon: Leslie Balbuena MD;  Location: [facility] OR [address] [place] [address];  Service: Urology;  Laterality: Bilateral;    ANTEGRADE NEPHROSTOGRAPHY Left 4/20/2021    Procedure: NEPHROSTOGRAM, ANTEGRADE;  Surgeon: Leslie Balbuena MD;  Location: [facility] OR [address] [place] [address];  Service: Urology;   Laterality: Left;    ANTEGRADE NEPHROSTOGRAPHY Right 10/27/2022    Procedure: NEPHROSTOGRAM, ANTEGRADE;  Surgeon: Chirag Russ MD;  Location: [facility] OR 1ST FLR;  Service: Urology;  Laterality: Right;    BILATERAL OOPHORECTOMY  2015    CHOLECYSTECTOMY  11/09/2016    Done at [facility], path showed chronic cholecystitis and gallstones    cold knife conization  2014    COLECTOMY, RIGHT  9/17/2020    Procedure: COLECTOMY, RIGHT Extended;  Surgeon: Hammad Reynolds MD;  Location: [facility] OR 2ND FLR;  Service: Colon and Rectal;;    COLONOSCOPY  2014    COLONOSCOPY N/A 10/24/2016    at [facility], Dr Gutiérrez    COLONOSCOPY N/A 5/18/2018    Procedure: COLONOSCOPY;  Surgeon: Arden Gutiérrez MD;  Location: [facility] (4TH FLR);  Service: Endoscopy;  Laterality: N/A;    COLONOSCOPY N/A 7/28/2020    Procedure: COLONOSCOPY;  Surgeon: Hammad Reynolds MD;  Location: [facility] (4TH FLR);  Service: Colon and Rectal;  Laterality: N/A;  covid test [place] 7/25    CYSTOSCOPY WITH URETEROSCOPY, RETROGRADE PYELOGRAPHY, AND INSERTION OF STENT Bilateral 3/21/2020    Procedure: CYSTOSCOPY, WITH RETROGRADE PYELOGRAM,;  Surgeon: Leslie Balbuena MD;  Location: [facility] OR 1ST FLR;  Service: Urology;  Laterality: Bilateral;    DILATION OF NEPHROSTOMY TRACT Right 10/27/2022    Procedure: DILATION, NEPHROSTOMY TRACT;  Surgeon: Chirag Russ MD;  [facility]  Service: Urology;  Laterality: Right;    ESOPHAGOGASTRODUODENOSCOPY  2014    ESOPHAGOGASTRODUODENOSCOPY  11/18/2020    ESOPHAGOGASTRODUODENOSCOPY N/A 11/18/2020    Procedure: ESOPHAGOGASTRODUODENOSCOPY (EGD);  Surgeon: Zenon Spencer MD;  Location: [facility];  Service: Endoscopy;  Laterality: N/A;    ESOPHAGOGASTRODUODENOSCOPY N/A 12/11/2020    Procedure: EGD (ESOPHAGOGASTRODUODENOSCOPY);  Surgeon: Juancho Muse MD;  Location: [facility] ENDO (2ND FLR);  Service: Endoscopy;  Laterality: N/A;    HYSTERECTOMY  2014    [facility] for cervical cancer    ILEOSTOMY  9/17/2020    Procedure:  CREATION, ILEOSTOMY;  Surgeon: Hammad Reynolds MD;  [facility] OR 2ND FLR;  Service: Colon and Rectal;;    LOOPOGRAM N/A 4/20/2021    Procedure: LOOPOGRAM;  Surgeon: Leslie Balbuena MD;  Location: [facility] OR 1ST FLR;  Service: Urology;  Laterality: N/A;    MOBILIZATION OF SPLENIC FLEXURE N/A 9/11/2020    Procedure: MOBILIZATION, COLONIC;  Surgeon: Hammad Reynolds MD;  Location: [facility] OR 2ND FLR;  Service: Colon and Rectal;  Laterality: N/A;    NEPHROSTOGRAPHY Bilateral 4/17/2021    Procedure: Nephrostogram;  Surgeon: Celeste Surgeon;  Location: [facility] CELESTE;  Service: Anesthesiology;  Laterality: Bilateral;    OOPHORECTOMY      PYELOSCOPY Right 10/27/2022    Procedure: PYELOSCOPY;  Surgeon: Chirag Russ MD;  Location: [facility] OR 1ST FLR;  Service: Urology;  Laterality: Right;    REPLACEMENT OF NEPHROSTOMY TUBE Right 10/27/2022    Procedure: REPLACEMENT, NEPHROSTOMY TUBE;  Surgeon: Chirag Russ MD;  Location: [facility] OR 1ST FLR;  Service: Urology;  Laterality: Right;    RETROPERITONEAL LYMPHADENECTOMY Right 9/17/2018    Procedure: LYMPHADENECTOMY, RETROPERITONEUM;  Surgeon: Sebas Reed MD;  Location: [facility] OR 2ND FLR;  Service: General;  Laterality: Right;  ILIAC    URETEROSCOPIC REMOVAL OF URETERIC CALCULUS Right 10/27/2022    Procedure: REMOVAL, CALCULUS, URETER, URETEROSCOPIC;  Surgeon: Chirag Russ MD;  [facility]  Service: Urology;  Laterality: Right;    URETEROSCOPY Right 10/27/2022    Procedure: URETEROSCOPY-ANTEGRADE;  Surgeon: Chirag Russ MD;  Location: [email or web address];  Service: Urology;  Laterality: Right;     Family History   Problem Relation Age of Onset    Heart disease Sister     Heart disease Maternal Grandmother     Colon cancer Father     Esophageal cancer Father     Cancer Father         Lung-smoker    Cancer Mother         Cervical    Cervical cancer Mother     Breast cancer Maternal Aunt     Suicide Daughter         jumped from parking structure    Drug abuse  Daughter     Drug abuse Daughter     Rectal cancer Neg Hx     Stomach cancer Neg Hx     Crohn's disease Neg Hx     Ulcerative colitis Neg Hx     Diabetes Neg Hx     Hypertension Neg Hx      Social History     Tobacco Use    Smoking status: Never    Smokeless tobacco: Never   Substance Use Topics    Alcohol use: No     Alcohol/week: 0.0 standard drinks    Drug use: No       OBJECTIVE:     Vital Signs (Most Recent)  Temp: 97.7 °F (36.5 °C) (02/13/23 1337)  Pulse: 88 (02/13/23 1337)  Resp: 16 (02/13/23 1337)  BP: 128/68 (02/13/23 1337)  SpO2: 98 % (02/13/23 1337)    Physical Exam:  Physical Exam  Vitals and nursing note reviewed.   Constitutional:       General: She is not in acute distress.     Appearance: Normal appearance. She is not ill-appearing.   HENT:      Head: Normocephalic and atraumatic.   Eyes:      Extraocular Movements: Extraocular movements intact.      Conjunctiva/sclera: Conjunctivae normal.      Pupils: Pupils are equal, round, and reactive to light.   Pulmonary:      Effort: Pulmonary effort is normal. No respiratory distress.   Abdominal:      General: Abdomen is flat. There is no distension.      Comments: RLQ Ileostomy with bag in place  urostomy   Musculoskeletal:         General: Normal range of motion.      Comments: Bilateral flank PCN insertion sites with scant drainage of clear yellow fluid; no purulent or bleeding noted   Skin:     General: Skin is warm and dry.      Coloration: Skin is not jaundiced.   Neurological:      General: No focal deficit present.      Mental Status: She is alert and oriented to person, place, and time.   Psychiatric:         Mood and Affect: Mood normal.         Behavior: Behavior normal.         Thought Content: Thought content normal.         Judgment: Judgment normal.       Laboratory  I have reviewed all pertinent lab results within the past 24 hours.  CBC: No results for input(s): WBC, RBC, HGB, HCT, PLT, MCV, MCH, MCHC in the last 168 hours.  BMP: No  results for input(s): GLU, NA, K, CL, CO2, BUN, CREATININE, CALCIUM, MG in the last 168 hours.  CMP: No results for input(s): GLU, CALCIUM, ALBUMIN, PROT, NA, K, CO2, CL, BUN, CREATININE, ALKPHOS, ALT, AST, BILITOT in the last 168 hours.  Coagulation: No results for input(s): LABPROT, INR, APTT in the last 168 hours.    ASA/Mallampati  ASA: 3  Mallampati: 2    Imaging:  Recent imaging studies reviewed.    ASSESSMENT/PLAN:     Assessment:  58 y.o. female with a PMHx of bilateral PCN placement in 2020 for obstructive uropathy 2/2 pelvic XRT, hx cervical cancer, HTN, open transverse colon conduit urinary diversion on 9/11/2020 complicated by bowel perforation s/p take back for hemicolectomy and ileostomy creation 9/17/20 who has been referred to IR for bilateralPCN replacement for management of PCN dislodgment.The procedure was discussed in great detail with the patient including thorough explanations of the potential risks and benefits of PCN placement. Risks include hematuria, pain, sepsis, injury to ureter or kidney, injury to adjacent organs, catheter dislodgement and inability to remove PCN due to cystallization. The patient is a candidate for bilateralPCN replacement  with fentanyl for sedation only.  Pt last ate at 10 am, and IR requires for pts to be NPO for a full 8 hr prior to receiving full moderate sedation with fentanyl and versed. Pt reports she is willing to try PCN replacement today with just fentanyl only. If she is unable to tolerate this, we will reschedule PCN replacement for tomorrow using full moderate sedation which would require admission to the hospital overnight and NPO status starting at midnight. Plan discussed with ordering physician.The pt verbalized understanding of the plan and would like to proceed.    Plan:  Will proceed with bilateralPCN replacement as soon as we have an available team.   Please keep pt NPO   Anticoagulation history reviewed.  Coagulation labs reviewed.  Thank you for  the consult. Please contact with questions via [employer] secure chat.    Anna Castellon PA-C  Interventional Radiology  Spectra: [phone]

## 2023-02-13 NOTE — ED PROVIDER NOTES
Encounter Date: 2/13/2023       History     Chief Complaint   Patient presents with    nephrostomy bag displacement     Pt's nephrostomy bag on left side was dislodged this morning.      57 y/o f, past medical history of HTN, cervical cancer s/p surgery and pelvic radiation, open transverse colon conduit urinary diversion on 9/11/2020 complicated by bowel perforation s/p take back for hemicolectomy and ileostomy creation (9/17/20). Also with a urostomy and bilateral nephrostomy tubes since 2021, c/o both of her nephrostomy tubes falling out today when she bent over to do something.  Otherwise at her baseline.    The history is provided by the patient.   Review of patient's allergies indicates:   Allergen Reactions    Bee sting [allergen ext-venom-honey bee]      Rash      Grass pollen-bermuda, standard      rash     Past Medical History:   Diagnosis Date    Abnormal mammogram 08/25/2020    Acute blood loss anemia     Acute deep vein thrombosis (DVT) of lower extremity 12/09/2020    Advance care planning 04/30/2021    Anemia due to chronic blood loss     Anemia due to chronic kidney disease     Anxiety     Cardiovascular event risk -- low 09/14/2015    ASCVD 10-year risk 1.9% (with optimal risk factors 1.3%) as of 9/14/2015     Cervical cancer 2014    Chronic back pain     Colostomy care     Deep vein thrombosis     Depression     Diarrhea due to malabsorption 11/14/2018    Diarrhea due to malabsorption 11/14/2018    Difficult intubation     Disorder of kidney and ureter     DVT of lower extremity, bilateral 11/04/2020    Fibromyalgia     Fungemia 09/27/2020    Generalized abdominal pain 08/25/2020    GERD (gastroesophageal reflux disease)     Hemifacial spasm 09/16/2015    Hiatal hernia 2014    History of cervical cancer 10/11/2018    Hx of psychiatric care     Cymbalta, trazodone    Hypertension     Hypomagnesemia 11/21/2018    Lactose intolerance     Metastatic squamous cell carcinoma to lymph node 10/11/2018     Nephrostomy tube displaced     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 drug 11/14/2018    Osteoarthritis of back     Peritonitis 09/22/2020    Pseudomonas urinary tract infection 04/21/2021    Psychiatric problem     Refusal of blood transfusions as patient is [employer]     Schatzki's ring 09/14/2015    Seen on outside EGD 05/2014, underwent esophageal dilatation. Bx were negative.     Seizures     Sleep stage dysfunction     Noted on PSG 06/2017; negative for obstructive sleep apnea     Stroke     Urinary tract infection associated with nephrostomy catheter 06/11/2020    Wound infection after surgery 09/24/2020     Past Surgical History:   Procedure Laterality Date    ANTEGRADE NEPHROSTOGRAPHY Bilateral 9/28/2020    Procedure: Nephrostogram - antegrade;  Surgeon: Leslie Balbuena MD;  [facility]  Service: Urology;  Laterality: Bilateral;    ANTEGRADE NEPHROSTOGRAPHY Left 4/20/2021    Procedure: NEPHROSTOGRAM, ANTEGRADE;  Surgeon: Leslie Balbuena MD;  Location: [facility] OR 1ST FLR;  Service: Urology;  Laterality: Left;    ANTEGRADE NEPHROSTOGRAPHY Right 10/27/2022    Procedure: NEPHROSTOGRAM, ANTEGRADE;  Surgeon: Chirag Russ MD;  [facility];  Service: Urology;  Laterality: Right;    BILATERAL OOPHORECTOMY  2015    CHOLECYSTECTOMY  11/09/2016    Done at [facility], path showed chronic cholecystitis and gallstones    cold knife conization  2014    COLECTOMY, RIGHT  9/17/2020    Procedure: COLECTOMY, RIGHT Extended;  Surgeon: Hammad Reynolds MD;  Location: [facility] OR 2ND FLR;  Service: Colon and Rectal;;    COLONOSCOPY  2014    COLONOSCOPY N/A 10/24/2016    [facility]    COLONOSCOPY N/A 5/18/2018    Procedure: COLONOSCOPY;  Surgeon: Arden Gutiérrez MD;  Location: [facility] ENDO (4TH FLR);  Service: Endoscopy;  Laterality: N/A;    COLONOSCOPY N/A 7/28/2020    Procedure: COLONOSCOPY;  Surgeon: Hammad Reynolds MD;  Location: [facility] ENDO (4TH FLR);  Service: Colon and Rectal;   Laterality: N/A;  covid test [place] 7/25    CYSTOSCOPY WITH URETEROSCOPY, RETROGRADE PYELOGRAPHY, AND INSERTION OF STENT Bilateral 3/21/2020    Procedure: CYSTOSCOPY, WITH RETROGRADE PYELOGRAM,;  Surgeon: Leslie Balbuena MD;  Location: [facility] OR 1ST FLR;  Service: Urology;  Laterality: Bilateral;    DILATION OF NEPHROSTOMY TRACT Right 10/27/2022    Procedure: DILATION, NEPHROSTOMY TRACT;  Surgeon: Chirag Russ MD;  Location: [facility] OR 1ST FLR;  Service: Urology;  Laterality: Right;    ESOPHAGOGASTRODUODENOSCOPY  2014    ESOPHAGOGASTRODUODENOSCOPY  11/18/2020    ESOPHAGOGASTRODUODENOSCOPY N/A 11/18/2020    Procedure: ESOPHAGOGASTRODUODENOSCOPY (EGD);  Surgeon: Zenon Spencer MD;  Location: [facility];  Service: Endoscopy;  Laterality: N/A;    ESOPHAGOGASTRODUODENOSCOPY N/A 12/11/2020    Procedure: EGD (ESOPHAGOGASTRODUODENOSCOPY);  Surgeon: Juancho Muse MD;  Location: [facility] (2ND FLR);  Service: Endoscopy;  Laterality: N/A;    HYSTERECTOMY  2014    [facility] for cervical [facility]    ILEOSTOMY  9/17/2020    Procedure: CREATION, ILEOSTOMY;  Surgeon: Hammad Reynolds MD;  Location: [facility] OR 2ND FLR;  Service: Colon and Rectal;;    LOOPOGRAM N/A 4/20/2021    Procedure: LOOPOGRAM;  Surgeon: Leslie Balbuena MD;  Location: [facility] OR 1ST FLR;  Service: Urology;  Laterality: N/A;    MOBILIZATION OF SPLENIC FLEXURE N/A 9/11/2020    Procedure: MOBILIZATION, COLONIC;  Surgeon: Hammad Reynolds MD;  Location: [facility] OR 2ND FLR;  Service: Colon and Rectal;  Laterality: N/A;    NEPHROSTOGRAPHY Bilateral 4/17/2021    Procedure: Nephrostogram;  Surgeon: Celeste Surgeon;  Location: [facility] CELESTE;  Service: Anesthesiology;  Laterality: Bilateral;    OOPHORECTOMY      PYELOSCOPY Right 10/27/2022    Procedure: PYELOSCOPY;  Surgeon: Chirag Russ MD;  Location: [facility] OR 1ST FLR;  Service: Urology;  Laterality: Right;    REPLACEMENT OF NEPHROSTOMY TUBE Right 10/27/2022    Procedure: REPLACEMENT, NEPHROSTOMY TUBE;  Surgeon: Chirag MICHELLE  MD Jeb;  Location: [facility] OR 1ST FLR;  Service: Urology;  Laterality: Right;    RETROPERITONEAL LYMPHADENECTOMY Right 9/17/2018    Procedure: LYMPHADENECTOMY, RETROPERITONEUM;  Surgeon: Sebas Reed MD;  Location: [facility] OR 2ND FLR;  Service: General;  Laterality: Right;  ILIAC    URETEROSCOPIC REMOVAL OF URETERIC CALCULUS Right 10/27/2022    Procedure: REMOVAL, CALCULUS, URETER, URETEROSCOPIC;  Surgeon: Chirag Russ MD;  Location: [facility] OR 1ST FLR;  Service: Urology;  Laterality: Right;    URETEROSCOPY Right 10/27/2022    Procedure: URETEROSCOPY-ANTEGRADE;  Surgeon: Chirag Russ MD;  Location: [facility] OR 1ST FLR;  Service: Urology;  Laterality: Right;     Family History   Problem Relation Age of Onset    Heart disease Sister     Heart disease Maternal Grandmother     Colon cancer Father     Esophageal cancer Father     Cancer Father         Lung-smoker    Cancer Mother         Cervical    Cervical cancer Mother     Breast cancer Maternal Aunt     Suicide Daughter         jumped from parking structure    Drug abuse Daughter     Drug abuse Daughter     Rectal cancer Neg Hx     Stomach cancer Neg Hx     Crohn's disease Neg Hx     Ulcerative colitis Neg Hx     Diabetes Neg Hx     Hypertension Neg Hx      Social History     Tobacco Use    Smoking status: Never    Smokeless tobacco: Never   Substance Use Topics    Alcohol use: No     Alcohol/week: 0.0 standard drinks    Drug use: No     Review of Systems    Physical Exam     Initial Vitals [02/13/23 1337]   BP Pulse Resp Temp SpO2   128/68 88 16 97.7 °F (36.5 °C) 98 %      MAP       --         Physical Exam    Nursing note and vitals reviewed.  Constitutional: Vital signs are normal. She appears well-developed and well-nourished. She is not diaphoretic.  Non-toxic appearance. She does not appear ill. No distress.   HENT:   Head: Normocephalic and atraumatic.   Mouth/Throat: Mucous membranes are normal. Mucous membranes are not dry.   Eyes: Conjunctivae and  lids are normal.   Neck: Neck supple.   Cardiovascular:  Normal rate.           Pulmonary/Chest: No respiratory distress.   Abdominal:   Early ostomy with home liquid stool in place  Urostomy with clear urine  R nephrostomy in place though slightly dislodged, dressing dirty  L nephrostomy tube absent     Musculoskeletal:      Cervical back: Neck supple.     Neurological: She is alert and oriented to person, place, and time.   Skin: Skin is dry and intact. No pallor.   Psychiatric: She has a normal mood and affect. Her speech is normal and behavior is normal.       ED Course   Procedures  Labs Reviewed   CBC W/ AUTO DIFFERENTIAL - Abnormal; Notable for the following components:       Result Value    MCH 26.7 (*)     MCHC 29.4 (*)     RDW 14.6 (*)     Mono # 1.2 (*)     All other components within normal limits   ISTAT PROCEDURE - Abnormal; Notable for the following components:    POC Creatinine 1.5 (*)     All other components within normal limits   HIV 1 / 2 ANTIBODY    Narrative:     Release to patient->Immediate   HEPATITIS C ANTIBODY    Narrative:     Release to patient->Immediate   ISTAT CHEM8          Imaging Results    None          Medications   acetaminophen tablet 1,000 mg (has no administration in time range)     Medical Decision Making:   History:   Old Medical Records: I decided to obtain old medical records.  Old Records Summarized: records from previous admission(s).  Initial Assessment:   Left nephrostomy tube removal, right nephrostomy tube displacement   vital signs stable and patient well-appearing clinically    Clinical Tests:   Lab Tests: Ordered and Reviewed  Radiological Study: Reviewed and Ordered  Medical Tests: Ordered and Reviewed  ED Management:  -Interventional Radiology consulted on arrival for replacement of patient's nephrostomy tube - reported that they would be able to replace the tubes today and would attempt sedation with fentanyl given the patient was not NPO.  -plan to also check  lab work to ensure stable    .8:57 PM  Multiple attempts made to reach interventional radiology to understand patient's medical plan.  Unable to reach anybody from Interventional Radiology despite multiple attempts.  I was able to reach the radiology resident on-call who thinks that the patient is likely not having the procedure anymore this evening but he is also unaware of what the ultimate plan is.  Given that the patient has now been waiting for 6 hours and we can not reach anybody we will now have to admit her overnight for observation.  I have updated the patient on the plan.  Other:   I have discussed this case with another health care provider.       <> Summary of the Discussion: Interventional radiology resident                        Clinical Impression:   Final diagnoses:  [T83.022A] Nephrostomy tube displaced (Primary)        ED Disposition Condition    Observation Stable                Gissel Bryson MD  02/13/23 2058

## 2023-02-13 NOTE — ED NOTES
I-STAT Chem-8+ Results:   Value Reference Range   Sodium 139 136-145 mmol/L   Potassium  4.7 3.5-5.1 mmol/L   Chloride 109  mmol/L   Ionized Calcium 1.22 1.06-1.42 mmol/L   CO2 (measured) 24 23-29 mmol/L   Glucose 95  mg/dL   BUN 28 6-30 mg/dL   Creatinine 1.5 0.5-1.4 mg/dL   Hematocrit 46 36-54%

## 2023-02-13 NOTE — ED NOTES
Patient identifiers verified and correct for [facility]  LOC: The patient is awake, alert and aware of environment with an appropriate affect, the patient is oriented x 3 and speaking appropriately.   APPEARANCE: Patient appears comfortable and in no acute distress, patient is clean and well groomed.  SKIN: The skin is warm and dry, color consistent with ethnicity, patient has normal skin turgor and moist mucus membranes, skin intact (minus bilateral nephrostomy tube sites in back and bilateral ostomies on abdomen), no breakdown or bruising noted.   MUSCULOSKELETAL: Patient moving all extremities spontaneously, no swelling noted.  RESPIRATORY: Airway is open and patent, respirations are spontaneous, patient has a normal effort and rate, no accessory muscle use noted, pt placed on continuous pulse ox with O2 sats noted at 99% on room air.  CARDIAC: Pt placed on cardiac monitor. Patient has a normal rate and regular rhythm, no edema noted, capillary refill < 3 seconds.   GASTRO: Soft and non tender to palpation, no distention noted, normoactive bowel sounds present in all four quadrants. Pt does not have Bms but rather uses two ostomy bags on bilateral abdomen  : Pt does not produce urine.  NEURO: Pt opens eyes spontaneously, behavior appropriate to situation, follows commands, facial expression symmetrical, bilateral hand grasp equal and even, purposeful motor response noted, normal sensation in all extremities when touched with a finger.

## 2023-02-13 NOTE — Clinical Note
Diagnosis: Nephrostomy tube displaced [456106]   Future Attending Provider: CODY CINTRON [[id number]]   Admitting Provider:: NATHANIEL ZAPATA [[id number]]

## 2023-02-14 PROBLEM — N18.30 ACUTE RENAL FAILURE SUPERIMPOSED ON STAGE 3 CHRONIC KIDNEY DISEASE: Status: ACTIVE | Noted: 2023-02-14

## 2023-02-14 PROBLEM — N17.9 ACUTE RENAL FAILURE SUPERIMPOSED ON STAGE 3 CHRONIC KIDNEY DISEASE: Status: ACTIVE | Noted: 2023-02-14

## 2023-02-14 PROBLEM — N18.30 CKD (CHRONIC KIDNEY DISEASE), STAGE III: Status: ACTIVE | Noted: 2023-02-14

## 2023-02-14 LAB
ANION GAP SERPL CALC-SCNC: 11 MMOL/L (ref 8–16)
APTT BLDCRRT: 30.4 SEC (ref 21–32)
BACTERIA #/AREA URNS AUTO: ABNORMAL /HPF
BILIRUB UR QL STRIP: NEGATIVE
BUN SERPL-MCNC: 25 MG/DL (ref 6–20)
CALCIUM SERPL-MCNC: 9.9 MG/DL (ref 8.7–10.5)
CHLORIDE SERPL-SCNC: 110 MMOL/L (ref 95–110)
CLARITY UR REFRACT.AUTO: ABNORMAL
CO2 SERPL-SCNC: 17 MMOL/L (ref 23–29)
COLOR UR AUTO: YELLOW
CREAT SERPL-MCNC: 1.8 MG/DL (ref 0.5–1.4)
ERYTHROCYTE [DISTWIDTH] IN BLOOD BY AUTOMATED COUNT: 14.6 % (ref 11.5–14.5)
EST. GFR  (NO RACE VARIABLE): 31.9 ML/MIN/1.73 M^2
GLUCOSE SERPL-MCNC: 99 MG/DL (ref 70–110)
GLUCOSE UR QL STRIP: NEGATIVE
HCT VFR BLD AUTO: 48.7 % (ref 37–48.5)
HGB BLD-MCNC: 14 G/DL (ref 12–16)
HGB UR QL STRIP: ABNORMAL
HYALINE CASTS UR QL AUTO: 0 /LPF
INR PPP: 1 (ref 0.8–1.2)
KETONES UR QL STRIP: NEGATIVE
LEUKOCYTE ESTERASE UR QL STRIP: ABNORMAL
MAGNESIUM SERPL-MCNC: 1.9 MG/DL (ref 1.6–2.6)
MCH RBC QN AUTO: 26.5 PG (ref 27–31)
MCHC RBC AUTO-ENTMCNC: 28.7 G/DL (ref 32–36)
MCV RBC AUTO: 92 FL (ref 82–98)
MICROSCOPIC COMMENT: ABNORMAL
NITRITE UR QL STRIP: NEGATIVE
PH UR STRIP: 8 [PH] (ref 5–8)
PHOSPHATE SERPL-MCNC: 4.6 MG/DL (ref 2.7–4.5)
PLATELET # BLD AUTO: 196 K/UL (ref 150–450)
PMV BLD AUTO: 11.1 FL (ref 9.2–12.9)
POTASSIUM SERPL-SCNC: 4.4 MMOL/L (ref 3.5–5.1)
PROT UR QL STRIP: ABNORMAL
PROTHROMBIN TIME: 10.3 SEC (ref 9–12.5)
RBC # BLD AUTO: 5.29 M/UL (ref 4–5.4)
RBC #/AREA URNS AUTO: 33 /HPF (ref 0–4)
SODIUM SERPL-SCNC: 138 MMOL/L (ref 136–145)
SP GR UR STRIP: 1.01 (ref 1–1.03)
URN SPEC COLLECT METH UR: ABNORMAL
WBC # BLD AUTO: 11.17 K/UL (ref 3.9–12.7)
WBC #/AREA URNS AUTO: >100 /HPF (ref 0–5)

## 2023-02-14 PROCEDURE — 99232 PR SUBSEQUENT HOSPITAL CARE,LEVL II: ICD-10-PCS | Mod: ,,, | Performed by: HOSPITALIST

## 2023-02-14 PROCEDURE — 85730 THROMBOPLASTIN TIME PARTIAL: CPT | Performed by: STUDENT IN AN ORGANIZED HEALTH CARE EDUCATION/TRAINING PROGRAM

## 2023-02-14 PROCEDURE — 87077 CULTURE AEROBIC IDENTIFY: CPT | Performed by: HOSPITALIST

## 2023-02-14 PROCEDURE — G0378 HOSPITAL OBSERVATION PER HR: HCPCS

## 2023-02-14 PROCEDURE — 84100 ASSAY OF PHOSPHORUS: CPT | Performed by: STUDENT IN AN ORGANIZED HEALTH CARE EDUCATION/TRAINING PROGRAM

## 2023-02-14 PROCEDURE — 63600175 PHARM REV CODE 636 W HCPCS: Performed by: STUDENT IN AN ORGANIZED HEALTH CARE EDUCATION/TRAINING PROGRAM

## 2023-02-14 PROCEDURE — 36415 COLL VENOUS BLD VENIPUNCTURE: CPT | Performed by: HOSPITALIST

## 2023-02-14 PROCEDURE — 94760 N-INVAS EAR/PLS OXIMETRY 1: CPT

## 2023-02-14 PROCEDURE — 25000003 PHARM REV CODE 250: Performed by: STUDENT IN AN ORGANIZED HEALTH CARE EDUCATION/TRAINING PROGRAM

## 2023-02-14 PROCEDURE — 85610 PROTHROMBIN TIME: CPT | Performed by: STUDENT IN AN ORGANIZED HEALTH CARE EDUCATION/TRAINING PROGRAM

## 2023-02-14 PROCEDURE — 87086 URINE CULTURE/COLONY COUNT: CPT | Performed by: HOSPITALIST

## 2023-02-14 PROCEDURE — 25500020 PHARM REV CODE 255: Performed by: HOSPITALIST

## 2023-02-14 PROCEDURE — 63600175 PHARM REV CODE 636 W HCPCS: Performed by: HOSPITALIST

## 2023-02-14 PROCEDURE — 11000001 HC ACUTE MED/SURG PRIVATE ROOM

## 2023-02-14 PROCEDURE — 25000003 PHARM REV CODE 250: Performed by: HOSPITALIST

## 2023-02-14 PROCEDURE — 99232 SBSQ HOSP IP/OBS MODERATE 35: CPT | Mod: ,,, | Performed by: HOSPITALIST

## 2023-02-14 PROCEDURE — 85027 COMPLETE CBC AUTOMATED: CPT | Performed by: STUDENT IN AN ORGANIZED HEALTH CARE EDUCATION/TRAINING PROGRAM

## 2023-02-14 PROCEDURE — 87040 BLOOD CULTURE FOR BACTERIA: CPT | Mod: 59 | Performed by: HOSPITALIST

## 2023-02-14 PROCEDURE — 87088 URINE BACTERIA CULTURE: CPT | Performed by: HOSPITALIST

## 2023-02-14 PROCEDURE — 81001 URINALYSIS AUTO W/SCOPE: CPT | Performed by: HOSPITALIST

## 2023-02-14 PROCEDURE — 83735 ASSAY OF MAGNESIUM: CPT | Performed by: STUDENT IN AN ORGANIZED HEALTH CARE EDUCATION/TRAINING PROGRAM

## 2023-02-14 PROCEDURE — 80048 BASIC METABOLIC PNL TOTAL CA: CPT | Performed by: STUDENT IN AN ORGANIZED HEALTH CARE EDUCATION/TRAINING PROGRAM

## 2023-02-14 PROCEDURE — 87186 SC STD MICRODIL/AGAR DIL: CPT | Performed by: HOSPITALIST

## 2023-02-14 RX ORDER — LIDOCAINE HYDROCHLORIDE 10 MG/ML
INJECTION INFILTRATION; PERINEURAL
Status: COMPLETED | OUTPATIENT
Start: 2023-02-14 | End: 2023-02-14

## 2023-02-14 RX ORDER — LINEZOLID 600 MG/1
600 TABLET, FILM COATED ORAL EVERY 12 HOURS
Status: DISCONTINUED | OUTPATIENT
Start: 2023-02-14 | End: 2023-02-17

## 2023-02-14 RX ORDER — SODIUM CHLORIDE 9 MG/ML
INJECTION, SOLUTION INTRAVENOUS CONTINUOUS
Status: DISCONTINUED | OUTPATIENT
Start: 2023-02-14 | End: 2023-02-15

## 2023-02-14 RX ORDER — FENTANYL CITRATE 50 UG/ML
INJECTION, SOLUTION INTRAMUSCULAR; INTRAVENOUS
Status: COMPLETED | OUTPATIENT
Start: 2023-02-14 | End: 2023-02-14

## 2023-02-14 RX ORDER — ACETAMINOPHEN 325 MG/1
650 TABLET ORAL EVERY 6 HOURS PRN
Status: DISCONTINUED | OUTPATIENT
Start: 2023-02-14 | End: 2023-02-18 | Stop reason: HOSPADM

## 2023-02-14 RX ORDER — MIDAZOLAM HYDROCHLORIDE 1 MG/ML
INJECTION INTRAMUSCULAR; INTRAVENOUS
Status: COMPLETED | OUTPATIENT
Start: 2023-02-14 | End: 2023-02-14

## 2023-02-14 RX ADMIN — LINEZOLID 600 MG: 600 TABLET, FILM COATED ORAL at 08:02

## 2023-02-14 RX ADMIN — MIDAZOLAM HYDROCHLORIDE 0.5 MG: 1 INJECTION, SOLUTION INTRAMUSCULAR; INTRAVENOUS at 09:02

## 2023-02-14 RX ADMIN — SODIUM BICARBONATE 1300 MG: 650 TABLET ORAL at 08:02

## 2023-02-14 RX ADMIN — FENTANYL CITRATE 25 MCG: 50 INJECTION, SOLUTION INTRAMUSCULAR; INTRAVENOUS at 09:02

## 2023-02-14 RX ADMIN — LIDOCAINE HYDROCHLORIDE 5 ML: 10 INJECTION, SOLUTION INFILTRATION; PERINEURAL at 09:02

## 2023-02-14 RX ADMIN — SODIUM CHLORIDE: 9 INJECTION, SOLUTION INTRAVENOUS at 08:02

## 2023-02-14 RX ADMIN — CEFTRIAXONE SODIUM 1 G: 1 INJECTION, POWDER, FOR SOLUTION INTRAMUSCULAR; INTRAVENOUS at 09:02

## 2023-02-14 RX ADMIN — FENTANYL CITRATE 25 MCG: 50 INJECTION, SOLUTION INTRAMUSCULAR; INTRAVENOUS at 08:02

## 2023-02-14 RX ADMIN — IOHEXOL 20 ML: 300 INJECTION, SOLUTION INTRAVENOUS at 09:02

## 2023-02-14 RX ADMIN — SODIUM CHLORIDE: 9 INJECTION, SOLUTION INTRAVENOUS at 11:02

## 2023-02-14 RX ADMIN — MIRTAZAPINE 15 MG: 15 TABLET, ORALLY DISINTEGRATING ORAL at 12:02

## 2023-02-14 RX ADMIN — CEFTRIAXONE 1 G: 1 INJECTION, POWDER, FOR SOLUTION INTRAMUSCULAR; INTRAVENOUS at 05:02

## 2023-02-14 RX ADMIN — GABAPENTIN 200 MG: 100 CAPSULE ORAL at 08:02

## 2023-02-14 RX ADMIN — LIDOCAINE HYDROCHLORIDE 5 ML: 10 INJECTION, SOLUTION INFILTRATION; PERINEURAL at 08:02

## 2023-02-14 RX ADMIN — ACETAMINOPHEN 650 MG: 325 TABLET ORAL at 05:02

## 2023-02-14 RX ADMIN — MIDAZOLAM HYDROCHLORIDE 0.5 MG: 1 INJECTION, SOLUTION INTRAMUSCULAR; INTRAVENOUS at 08:02

## 2023-02-14 RX ADMIN — SODIUM BICARBONATE 1300 MG: 650 TABLET ORAL at 11:02

## 2023-02-14 RX ADMIN — GABAPENTIN 200 MG: 100 CAPSULE ORAL at 12:02

## 2023-02-14 NOTE — PROGRESS NOTES
[facility] Medicine  Progress Note    Patient Name: Edita Riley  MRN: [id number]  Patient Class: OP- Observation   Admission Date: 2/13/2023  Length of Stay: 0 days  Attending Physician: Alessandra Juarez MD  Primary Care Provider: Primary Doctor No        Subjective:     Principal Problem:Displacement of nephrostomy tube        HPI:  Ms. Riley is a 59 yo F with cervical cancer s/p HYST/chemo/XRT, BL ureteral obstruction 2/2 radiation cystitis s/p colon conduit (ultimately required BL nephrostomy tube placement, h/o bowel perforation from colon conduit anastomosis s/p hemicolectomy + ileostomy creation that presents with dislodged nephrostomy tubes.    Patient was previously well until earlier today when she bent over and felt her L nephrostomy tube dislodge. She has some mild pain around the site but otherwise no new complaints. Denies fevers/chills, cough/congestion, nausea/vomiting, changes in output.    Tobacco use: denies  EtOH use: denies  Illicit drug use: denies  Functional status: completes ADLs independently  Baseline mental status: AAOx3  Living situation: lives with daughter and     ED Course:  Afebrile, hypotensive (90s/50s). Satting well on RA. Initial labs notable for improved renal function. IR consulted with plan for replacement of nephrostomy tube, tentatively on 2/14.      Overview/Hospital Course:  Ms. Riley is a 59 yo F with cervical cancer s/p HYST/chemo/XRT, BL ureteral obstruction 2/2 radiation cystitis s/p colon conduit (ultimately required BL nephrostomy tube placement, h/o bowel perforation from colon conduit anastomosis s/p hemicolectomy + ileostomy creation that presents with dislodged nephrostomy tubes.    Patient was previously well until earlier today when she bent over and felt her L nephrostomy tube dislodge. She has some mild pain around the site but otherwise no new complaints. Denies fevers/chills, cough/congestion,  nausea/vomiting, changes in output.  ED Course:  Afebrile, hypotensive (90s/50s). Satting well on RA. Initial labs notable for improved renal function. IR consulted with plan for replacement of nephrostomy tube, tentatively on 2/14.  IR is consulted.S/P Left nephrostomy tube replacement and exchange of right nephrostomy tube.receiving empiric IV Abx and started on IVF for mild increased Cr.      Past Medical History:   Diagnosis Date    Abnormal mammogram 08/25/2020    Acute blood loss anemia     Acute deep vein thrombosis (DVT) of lower extremity 12/09/2020    Advance care planning 04/30/2021    Anemia due to chronic blood loss     Anemia due to chronic kidney disease     Anxiety     Cardiovascular event risk -- low 09/14/2015    ASCVD 10-year risk 1.9% (with optimal risk factors 1.3%) as of 9/14/2015     Cervical cancer 2014    Chronic back pain     Colostomy care     Deep vein thrombosis     Depression     Diarrhea due to malabsorption 11/14/2018    Diarrhea due to malabsorption 11/14/2018    Difficult intubation     Disorder of kidney and ureter     DVT of lower extremity, bilateral 11/04/2020    Fibromyalgia     Fungemia 09/27/2020    Generalized abdominal pain 08/25/2020    GERD (gastroesophageal reflux disease)     Hemifacial spasm 09/16/2015    Hiatal hernia 2014    History of cervical cancer 10/11/2018    Hx of psychiatric care     Cymbalta, trazodone    Hypertension     Hypomagnesemia 11/21/2018    Lactose intolerance     Metastatic squamous cell carcinoma to lymph node 10/11/2018    Nephrostomy tube displaced     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 drug 11/14/2018    Osteoarthritis of back     Peritonitis 09/22/2020    Pseudomonas urinary tract infection 04/21/2021    Psychiatric problem     Refusal of blood transfusions as patient is [employer]     Schatzki's ring 09/14/2015    Seen on outside EGD 05/2014, underwent esophageal dilatation. Bx were negative.      Seizures     Sleep stage dysfunction     Noted on PSG 06/2017; negative for obstructive sleep apnea     Stroke     Urinary tract infection associated with nephrostomy catheter 06/11/2020    Wound infection after surgery 09/24/2020       Past Surgical History:   Procedure Laterality Date    ANTEGRADE NEPHROSTOGRAPHY Bilateral 9/28/2020    Procedure: Nephrostogram - antegrade;  Surgeon: Leslie Balbuena MD;  Location: [facility] OR [address] [place] [address];  Service: Urology;  Laterality: Bilateral;    ANTEGRADE NEPHROSTOGRAPHY Left 4/20/2021    Procedure: NEPHROSTOGRAM, ANTEGRADE;  Surgeon: Leslie Balbuena MD;  Location: [facility] OR [address] [place] [address];  Service: Urology;  Laterality: Left;    ANTEGRADE NEPHROSTOGRAPHY Right 10/27/2022    Procedure: NEPHROSTOGRAM, ANTEGRADE;  Surgeon: Chirag Russ MD;  Location: [facility] OR [address] [place] [address];  Service: Urology;  Laterality: Right;    BILATERAL OOPHORECTOMY  2015    CHOLECYSTECTOMY  11/09/2016    Done at [facility], path showed chronic cholecystitis and gallstones    cold knife conization  2014    COLECTOMY, RIGHT  9/17/2020    Procedure: COLECTOMY, RIGHT Extended;  Surgeon: Hammad Reynolds MD;  Location: [facility] OR 2ND FLR;  Service: Colon and Rectal;;    COLONOSCOPY  2014    COLONOSCOPY N/A 10/24/2016    at [facility], Dr Gutiérrez    COLONOSCOPY N/A 5/18/2018    Procedure: COLONOSCOPY;  Surgeon: Arden Gutiérrez MD;  Location: [facility] (4TH FLR);  Service: Endoscopy;  Laterality: N/A;    COLONOSCOPY N/A 7/28/2020    Procedure: COLONOSCOPY;  Surgeon: Hammad Reynolds MD;  Location: [facility] ENDO (4TH FLR);  Service: Colon and Rectal;  Laterality: N/A;  [facility]    CYSTOSCOPY WITH URETEROSCOPY, RETROGRADE PYELOGRAPHY, AND INSERTION OF STENT Bilateral 3/21/2020    Procedure: CYSTOSCOPY, WITH RETROGRADE PYELOGRAM,;  Surgeon: Leslie Balbuena MD;  [facility];  Service: Urology;  Laterality: Bilateral;    DILATION OF NEPHROSTOMY TRACT Right 10/27/2022    Procedure: DILATION,  NEPHROSTOMY TRACT;  Surgeon: Chirag Russ MD;  Location: [facility] OR 1ST FLR;  Service: Urology;  Laterality: Right;    ESOPHAGOGASTRODUODENOSCOPY  2014    ESOPHAGOGASTRODUODENOSCOPY  11/18/2020    ESOPHAGOGASTRODUODENOSCOPY N/A 11/18/2020    Procedure: ESOPHAGOGASTRODUODENOSCOPY (EGD);  Surgeon: Zenon Spencer MD;  Location: [facility] ENDO;  Service: Endoscopy;  Laterality: N/A;    ESOPHAGOGASTRODUODENOSCOPY N/A 12/11/2020    Procedure: EGD (ESOPHAGOGASTRODUODENOSCOPY);  Surgeon: Juancho Muse MD;  Location: [facility] ENDO (2ND FLR);  Service: Endoscopy;  Laterality: N/A;    HYSTERECTOMY  2014    [facility] for cervical cancer    ILEOSTOMY  9/17/2020    Procedure: CREATION, ILEOSTOMY;  Surgeon: Hammad Reynolds MD;  Location: [facility] OR 2ND FLR;  Service: Colon and Rectal;;    LOOPOGRAM N/A 4/20/2021    Procedure: LOOPOGRAM;  Surgeon: Leslie Balbuena MD;  Location: [facility] OR 1ST FLR;  Service: Urology;  Laterality: N/A;    MOBILIZATION OF SPLENIC FLEXURE N/A 9/11/2020    Procedure: MOBILIZATION, COLONIC;  Surgeon: Hammad Reynolds MD;  Location: [facility] OR 2ND FLR;  Service: Colon and Rectal;  Laterality: N/A;    NEPHROSTOGRAPHY Bilateral 4/17/2021    Procedure: Nephrostogram;  Surgeon: Celeste Surgeon;  Location: [facility];  Service: Anesthesiology;  Laterality: Bilateral;    OOPHORECTOMY      PYELOSCOPY Right 10/27/2022    Procedure: PYELOSCOPY;  Surgeon: Chirag Russ MD;  Location: [facility] OR 1ST FLR;  Service: Urology;  Laterality: Right;    REPLACEMENT OF NEPHROSTOMY TUBE Right 10/27/2022    Procedure: REPLACEMENT, NEPHROSTOMY TUBE;  Surgeon: Chirag Russ MD;  Location: [facility] OR 1ST FLR;  Service: Urology;  Laterality: Right;    RETROPERITONEAL LYMPHADENECTOMY Right 9/17/2018    Procedure: LYMPHADENECTOMY, RETROPERITONEUM;  Surgeon: Sebas Reed MD;  Location: [facility] OR 2ND FLR;  Service: General;  Laterality: Right;  ILIAC    URETEROSCOPIC REMOVAL OF URETERIC CALCULUS Right 10/27/2022    Procedure:  REMOVAL, CALCULUS, URETER, URETEROSCOPIC;  Surgeon: Chirag Russ MD;  Location: [facility] OR [address] [place] [address];  Service: Urology;  Laterality: Right;    URETEROSCOPY Right 10/27/2022    Procedure: URETEROSCOPY-ANTEGRADE;  Surgeon: Chirag Russ MD;  Location: [facility] OR [address] [place] [address];  Service: Urology;  Laterality: Right;       Review of patient's allergies indicates:   Allergen Reactions    Bee sting [allergen ext-venom-honey bee]      Rash      Grass pollen-bermuda, standard      rash       No current facility-administered medications on file prior to encounter.     Current Outpatient Medications on File Prior to Encounter   Medication Sig    acetaminophen (TYLENOL) 325 MG tablet Take 2 tablets (650 mg total) by mouth every 6 (six) hours as needed for Pain.    gabapentin (NEURONTIN) 100 MG capsule Take 2 capsules (200 mg total) by mouth every evening.    ketorolac (TORADOL) 10 mg tablet Take 1 tablet (10 mg total) by mouth every 6 (six) hours as needed for Pain.    melatonin (MELATIN) 3 mg tablet Take 2 tablets (6 mg total) by mouth nightly as needed for Insomnia.    mirtazapine (REMERON SOL-TAB) 15 MG disintegrating tablet Dissolve 1 tablet (15 mg total) by mouth nightly.    sodium bicarbonate 650 MG tablet Take 2 tablets (1,300 mg total) by mouth 2 (two) times daily.    sucralfate (CARAFATE) 1 gram tablet Take 1 tablet (1 g total) by mouth 4 (four) times daily before meals and nightly.    traMADoL (ULTRAM) 50 mg tablet Take 1 tablet (50 mg total) by mouth every 6 (six) hours.     Family History       Problem Relation (Age of Onset)    Breast cancer Maternal Aunt    Cancer Father, Mother    Cervical cancer Mother    Colon cancer Father    Drug abuse Daughter, Daughter    Esophageal cancer Father    Heart disease Sister, Maternal Grandmother    Suicide Daughter          Tobacco Use    Smoking status: Never    Smokeless tobacco: Never   Substance and Sexual Activity    Alcohol use: No     Alcohol/week: 0.0  standard drinks    Drug use: No    Sexual activity: Yes     Partners: Male     Birth control/protection: None     Comment:  19 years since 1999     Review of Systems   Constitutional:  Positive for chills (occasional). Negative for fever.   HENT:  Negative for congestion, sore throat and trouble swallowing.    Eyes:  Negative for visual disturbance.   Respiratory:  Negative for cough, shortness of breath and wheezing.    Cardiovascular:  Negative for chest pain, palpitations and leg swelling.   Gastrointestinal:  Negative for abdominal pain, blood in stool, constipation, diarrhea, nausea and vomiting.   Genitourinary:  Positive for flank pain (L-sided from nephrostomy site).   Musculoskeletal:  Negative for arthralgias and myalgias.   Skin:  Negative for rash.   Neurological:  Negative for dizziness, light-headedness, numbness and headaches.   Psychiatric/Behavioral:  Negative for agitation and confusion.    Objective:     Vital Signs (Most Recent):  Temp: 97.5 °F (36.4 °C) (02/14/23 0718)  Pulse: 84 (02/14/23 0925)  Resp: 20 (02/14/23 0925)  BP: (!) 102/56 (02/14/23 0925)  SpO2: 100 % (02/14/23 0925)   Vital Signs (24h Range):  Temp:  [96.2 °F (35.7 °C)-98.6 °F (37 °C)] 97.5 °F (36.4 °C)  Pulse:  [71-88] 84  Resp:  [12-20] 20  SpO2:  [95 %-100 %] 100 %  BP: ()/(51-85) 102/56     Weight: 85.7 kg (189 lb)  Body mass index is 27.91 kg/m².    Physical Exam  Constitutional:       General: She is not in acute distress.     Appearance: She is well-developed. She is not ill-appearing.   HENT:      Head: Normocephalic and atraumatic.      Mouth/Throat:      Pharynx: No oropharyngeal exudate.   Eyes:      Conjunctiva/sclera: Conjunctivae normal.      Pupils: Pupils are equal, round, and reactive to light.   Cardiovascular:      Rate and Rhythm: Normal rate and regular rhythm.      Heart sounds: Normal heart sounds. No murmur heard.  Pulmonary:      Effort: Pulmonary effort is normal. No respiratory distress.       Breath sounds: Normal breath sounds. No wheezing or rales.   Abdominal:      General: Bowel sounds are normal. There is no distension.      Palpations: Abdomen is soft.      Tenderness: There is no abdominal tenderness. There is no guarding.      Comments: RLQ ileostomy in place  LLQ urostomy in place   Genitourinary:     Comments: R nephrostomy in place with drainage of clear yellow fluid  L nephrostomy site open with scant drainage of yellow fluid, no nephrostomy tube in place  No purulence/bleeding from either site    Bilateral flank PCN insertion sites with scant drainage of clear yellow fluid; no purulent or bleeding noted   Musculoskeletal:         General: No tenderness.      Cervical back: Normal range of motion and neck supple.   Skin:     General: Skin is warm and dry.      Findings: No rash.   Neurological:      General: No focal deficit present.      Mental Status: She is alert and oriented to person, place, and time. Mental status is at baseline.      Cranial Nerves: No cranial nerve deficit.      Motor: No abnormal muscle tone.   Psychiatric:         Behavior: Behavior normal.       Significant Labs: All pertinent labs within the past 24 hours have been reviewed.  CBC:   Recent Labs   Lab 02/13/23  1608 02/13/23  1617 02/14/23  0412   WBC 9.44  --  11.17   HGB 13.9  --  14.0   HCT 47.3 46 48.7*     --  196       CMP:   Recent Labs   Lab 02/14/23  0412      K 4.4      CO2 17*   GLU 99   BUN 25*   CREATININE 1.8*   CALCIUM 9.9   ANIONGAP 11       Significant Imaging: I have reviewed and interpreted all pertinent imaging results/findings within the past 24 hours.      Assessment/Plan:      * Displacement of nephrostomy tube  59 yo F with BL ureteral obstruction 2/2 radiation cystitis s/p urostomy + BL nephrostomy tube placement admitted with L nephrostomy tube dislodgement. Afebrile, hypotensive (90s/50s). Satting well on RA. Initial labs notable for improved renal function. IR  consulted with plan for replacement of nephrostomy tube, tentatively on 2/14.    IR is consulted.S/P Left nephrostomy tube replacement and exchange of right nephrostomy tube.receiving empiric IV Abx and started on IVF for mild increased Cr.    Acute renal failure superimposed on stage 3 chronic kidney disease  Stared on IVF,will monitor.      CKD (chronic kidney disease), stage III  Will monitor.      Nephrostomy status       Presence of urostomy  H/o cervical cancer s/p HYST/chemo/XRT  Complicated by radiation cystitis s/p colon conduit  Developed bowel perforation from colon conduit anastamosis s/p hemicolectomy + ileostomy  S/p urostomy + BL nephrostomy tubes    Ileostomy in place       Bilateral ureteral obstruction    IR is consulted.S/P Left nephrostomy tube replacement and exchange of right nephrostomy tube.receiving empiric IV Abx and started on IVF for mild increased Cr.    Radiation cystitis       Cervical cancer          VTE Risk Mitigation (From admission, onward)         Ordered     Reason for No Pharmacological VTE Prophylaxis  Once        Question:  Reasons:  Answer:  Physician Provided (leave comment)  Comment:  procedure    02/13/23 2315     IP VTE HIGH RISK PATIENT  Once         02/13/23 2315     Place sequential compression device  Until discontinued         02/13/23 2315     Place sequential compression device  Until discontinued         02/13/23 2236                Discharge Planning   OK: 2/14/2023     Code Status: Full Code   Is the patient medically ready for discharge?:     Reason for patient still in hospital (select all that apply): Patient trending condition                     Alessandra Juarez MD  Department of Hospital Medicine   [facility] - Surgery

## 2023-02-14 NOTE — PROCEDURES
Interventional Radiology postop note    Pre Op Diagnosis: Obstructive uropathy  Post Op Diagnosis: Same    Procedure: Left nephrostomy tube replacement and exchange of right nephrostomy tube    Procedure performed by: Jacobo    Written Informed Consent Obtained: Yes  Specimen Removed: YES 10 mL yellow urine  Estimated Blood Loss: Minimal    Findings:   Antegrade nephrogram demonstrated moderate hydroureteronephrosis bilaterally. A left percutaneous 12 Fr nephrostomy tube was replaced and a right 12 Fr nephrostomy tube was exchanged.     Patient tolerated procedure well.    Brandon Soto DO  Interventional Radiology

## 2023-02-14 NOTE — ASSESSMENT & PLAN NOTE
H/o cervical cancer s/p HYST/chemo/XRT  Complicated by radiation cystitis s/p colon conduit  Developed bowel perforation from colon conduit anastamosis s/p hemicolectomy + ileostomy  S/p urostomy + BL nephrostomy tubes

## 2023-02-14 NOTE — ED TRIAGE NOTES
Edita Riley, a 58 y.o. female presents to the ED w/ complaint of neph bag displacement, left tube was dislodged this morning      Adult Physical Assessment  LOC: Edita Riley, 58 y.o. female verified via two identifiers.  The patient is awake, alert, oriented and speaking appropriately at this time.  APPEARANCE: Patient resting comfortably and appears to be in no acute distress at this time. Patient is clean and well groomed, patient's clothing is properly fastened.  SKIN:The skin is warm and dry, color consistent with ethnicity, patient has normal skin turgor and moist mucus membranes, skin intact, no breakdown or brusing noted.  MUSCULOSKELETAL: Patient moving all extremities well, no obvious swelling or deformities noted.  RESPIRATORY: Airway is open and patent, respirations are spontaneous, patient has a normal effort and rate, no accessory muscle use noted.  CARDIAC: Patient has a normal rate and rhythm, no periphreal edema noted in any extremity, capillary refill < 3 seconds in all extremities  ABDOMEN: Soft and non tender to palpation, no abdominal distention noted. Patient has two ostomy bags and two neph tubes. Left neph tube was found to be displaced this morning.   NEUROLOGIC: Eyes open spontaneously, behavior appropriate to situation, follows commands, facial expression symmetrical, bilateral hand grasp equal and even, purposeful motor response noted, normal sensation in all extremities when touched with a finger.      Triage note:  Chief Complaint   Patient presents with    nephrostomy bag displacement     Pt's nephrostomy bag on left side was dislodged this morning.      Review of patient's allergies indicates:   Allergen Reactions    Bee sting [allergen ext-venom-honey bee]      Rash      Grass pollen-bermuda, standard      rash     Past Medical History:   Diagnosis Date    Abnormal mammogram 08/25/2020    Acute blood loss anemia     Acute deep vein thrombosis (DVT) of lower  extremity 12/09/2020    Advance care planning 04/30/2021    Anemia due to chronic blood loss     Anemia due to chronic kidney disease     Anxiety     Cardiovascular event risk -- low 09/14/2015    ASCVD 10-year risk 1.9% (with optimal risk factors 1.3%) as of 9/14/2015     Cervical cancer 2014    Chronic back pain     Colostomy care     Deep vein thrombosis     Depression     Diarrhea due to malabsorption 11/14/2018    Diarrhea due to malabsorption 11/14/2018    Difficult intubation     Disorder of kidney and ureter     DVT of lower extremity, bilateral 11/04/2020    Fibromyalgia     Fungemia 09/27/2020    Generalized abdominal pain 08/25/2020    GERD (gastroesophageal reflux disease)     Hemifacial spasm 09/16/2015    Hiatal hernia 2014    History of cervical cancer 10/11/2018    Hx of psychiatric care     Cymbalta, trazodone    Hypertension     Hypomagnesemia 11/21/2018    Lactose intolerance     Metastatic squamous cell carcinoma to lymph node 10/11/2018    Nephrostomy tube displaced     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 drug 11/14/2018    Osteoarthritis of back     Peritonitis 09/22/2020    Pseudomonas urinary tract infection 04/21/2021    Psychiatric problem     Refusal of blood transfusions as patient is [employer]     Schatzki's ring 09/14/2015    Seen on outside EGD 05/2014, underwent esophageal dilatation. Bx were negative.     Seizures     Sleep stage dysfunction     Noted on PSG 06/2017; negative for obstructive sleep apnea     Stroke     Urinary tract infection associated with nephrostomy catheter 06/11/2020    Wound infection after surgery 09/24/2020

## 2023-02-14 NOTE — SUBJECTIVE & OBJECTIVE
Past Medical History:   Diagnosis Date    Abnormal mammogram 08/25/2020    Acute blood loss anemia     Acute deep vein thrombosis (DVT) of lower extremity 12/09/2020    Advance care planning 04/30/2021    Anemia due to chronic blood loss     Anemia due to chronic kidney disease     Anxiety     Cardiovascular event risk -- low 09/14/2015    ASCVD 10-year risk 1.9% (with optimal risk factors 1.3%) as of 9/14/2015     Cervical cancer 2014    Chronic back pain     Colostomy care     Deep vein thrombosis     Depression     Diarrhea due to malabsorption 11/14/2018    Diarrhea due to malabsorption 11/14/2018    Difficult intubation     Disorder of kidney and ureter     DVT of lower extremity, bilateral 11/04/2020    Fibromyalgia     Fungemia 09/27/2020    Generalized abdominal pain 08/25/2020    GERD (gastroesophageal reflux disease)     Hemifacial spasm 09/16/2015    Hiatal hernia 2014    History of cervical cancer 10/11/2018    Hx of psychiatric care     Cymbalta, trazodone    Hypertension     Hypomagnesemia 11/21/2018    Lactose intolerance     Metastatic squamous cell carcinoma to lymph node 10/11/2018    Nephrostomy tube displaced     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 drug 11/14/2018    Osteoarthritis of back     Peritonitis 09/22/2020    Pseudomonas urinary tract infection 04/21/2021    Psychiatric problem     Refusal of blood transfusions as patient is [facility]     Schatzki's ring 09/14/2015    Seen on outside EGD 05/2014, underwent esophageal dilatation. Bx were negative.     Seizures     Sleep stage dysfunction     Noted on PSG 06/2017; negative for obstructive sleep apnea     Stroke     Urinary tract infection associated with nephrostomy catheter 06/11/2020    Wound infection after surgery 09/24/2020       Past Surgical History:   Procedure Laterality Date    ANTEGRADE NEPHROSTOGRAPHY Bilateral 9/28/2020    Procedure: Nephrostogram - antegrade;  Surgeon: Leslie Balbuena MD;  Location: [facility] OR [address] [place] [address];   Service: Urology;  Laterality: Bilateral;    ANTEGRADE NEPHROSTOGRAPHY Left 4/20/2021    Procedure: NEPHROSTOGRAM, ANTEGRADE;  Surgeon: Leslie Balbuena MD;  Location: [facility] OR 1ST FLR;  Service: Urology;  Laterality: Left;    ANTEGRADE NEPHROSTOGRAPHY Right 10/27/2022    Procedure: NEPHROSTOGRAM, ANTEGRADE;  Surgeon: Chirag Russ MD;  Location: [facility] OR 1ST FLR;  Service: Urology;  Laterality: Right;    BILATERAL OOPHORECTOMY  2015    CHOLECYSTECTOMY  11/09/2016    Done at [facility], path showed chronic cholecystitis and gallstones    cold knife conization  2014    COLECTOMY, RIGHT  9/17/2020    Procedure: COLECTOMY, RIGHT Extended;  Surgeon: Hammad Reynolds MD;  Location: [facility] OR 2ND FLR;  Service: Colon and Rectal;;    COLONOSCOPY  2014    COLONOSCOPY N/A 10/24/2016    at [facility], Dr Gutiérrez    COLONOSCOPY N/A 5/18/2018    Procedure: COLONOSCOPY;  Surgeon: Arden Gutiérrez MD;  Location: [facility] (4TH FLR);  Service: Endoscopy;  Laterality: N/A;    COLONOSCOPY N/A 7/28/2020    Procedure: COLONOSCOPY;  Surgeon: Hammad Reynolds MD;  Location: [facility] (4TH FLR);  Service: Colon and Rectal;  Laterality: N/A;  covid test [place] 7/25    CYSTOSCOPY WITH URETEROSCOPY, RETROGRADE PYELOGRAPHY, AND INSERTION OF STENT Bilateral 3/21/2020    Procedure: CYSTOSCOPY, WITH RETROGRADE PYELOGRAM,;  Surgeon: Leslie Balbuena MD;  Location: [facility] OR 1ST FLR;  Service: Urology;  Laterality: Bilateral;    DILATION OF NEPHROSTOMY TRACT Right 10/27/2022    Procedure: DILATION, NEPHROSTOMY TRACT;  Surgeon: Chirag Russ MD;  Location: [facility] OR 1ST FLR;  Service: Urology;  Laterality: Right;    ESOPHAGOGASTRODUODENOSCOPY  2014    ESOPHAGOGASTRODUODENOSCOPY  11/18/2020    ESOPHAGOGASTRODUODENOSCOPY N/A 11/18/2020    Procedure: ESOPHAGOGASTRODUODENOSCOPY (EGD);  Surgeon: Zenon Spencer MD;  Location: [facility];  Service: Endoscopy;  Laterality: N/A;    ESOPHAGOGASTRODUODENOSCOPY N/A 12/11/2020    Procedure: EGD  (ESOPHAGOGASTRODUODENOSCOPY);  Surgeon: Juancho Muse MD;  Location: [facility] ENDO (2ND FLR);  Service: Endoscopy;  Laterality: N/A;    HYSTERECTOMY  2014    [facility] for cervical cancer    ILEOSTOMY  9/17/2020    Procedure: CREATION, ILEOSTOMY;  Surgeon: Hammad Reynolds MD;  Location: [facility] OR 2ND FLR;  Service: Colon and Rectal;;    LOOPOGRAM N/A 4/20/2021    Procedure: LOOPOGRAM;  Surgeon: Leslie Balbuena MD;  Location: [facility] OR 1ST FLR;  Service: Urology;  Laterality: N/A;    MOBILIZATION OF SPLENIC FLEXURE N/A 9/11/2020    Procedure: MOBILIZATION, COLONIC;  Surgeon: Hammad Reynolds MD;  Location: [facility] OR 2ND FLR;  Service: Colon and Rectal;  Laterality: N/A;    NEPHROSTOGRAPHY Bilateral 4/17/2021    Procedure: Nephrostogram;  Surgeon: Celeste Surgeon;  Location: [facility];  Service: Anesthesiology;  Laterality: Bilateral;    OOPHORECTOMY      PYELOSCOPY Right 10/27/2022    Procedure: PYELOSCOPY;  Surgeon: Chirag Russ MD;  [facility];  Service: Urology;  Laterality: Right;    REPLACEMENT OF NEPHROSTOMY TUBE Right 10/27/2022    Procedure: REPLACEMENT, NEPHROSTOMY TUBE;  Surgeon: Chirag Russ MD;  [facility];  Service: Urology;  Laterality: Right;    RETROPERITONEAL LYMPHADENECTOMY Right 9/17/2018    Procedure: LYMPHADENECTOMY, RETROPERITONEUM;  Surgeon: Sebas Reed MD;  [facility];  Service: General;  Laterality: Right;  ILIAC    URETEROSCOPIC REMOVAL OF URETERIC CALCULUS Right 10/27/2022    Procedure: REMOVAL, CALCULUS, URETER, URETEROSCOPIC;  Surgeon: Chirag Russ MD;  Location: [facility] OR [facility] FLR;  Service: Urology;  Laterality: Right;    URETEROSCOPY Right 10/27/2022    Procedure: URETEROSCOPY-ANTEGRADE;  Surgeon: Chirag Russ MD;  Location: [facility] OR [facility] FLR;  Service: Urology;  Laterality: Right;       Review of patient's allergies indicates:   Allergen Reactions    Bee sting [allergen ext-venom-honey bee]      Rash      Grass pollen-bermuda,  standard      rash       No current facility-administered medications on file prior to encounter.     Current Outpatient Medications on File Prior to Encounter   Medication Sig    acetaminophen (TYLENOL) 325 MG tablet Take 2 tablets (650 mg total) by mouth every 6 (six) hours as needed for Pain.    gabapentin (NEURONTIN) 100 MG capsule Take 2 capsules (200 mg total) by mouth every evening.    ketorolac (TORADOL) 10 mg tablet Take 1 tablet (10 mg total) by mouth every 6 (six) hours as needed for Pain.    melatonin (MELATIN) 3 mg tablet Take 2 tablets (6 mg total) by mouth nightly as needed for Insomnia.    mirtazapine (REMERON SOL-TAB) 15 MG disintegrating tablet Dissolve 1 tablet (15 mg total) by mouth nightly.    sodium bicarbonate 650 MG tablet Take 2 tablets (1,300 mg total) by mouth 2 (two) times daily.    sucralfate (CARAFATE) 1 gram tablet Take 1 tablet (1 g total) by mouth 4 (four) times daily before meals and nightly.    traMADoL (ULTRAM) 50 mg tablet Take 1 tablet (50 mg total) by mouth every 6 (six) hours.     Family History       Problem Relation (Age of Onset)    Breast cancer Maternal Aunt    Cancer Father, Mother    Cervical cancer Mother    Colon cancer Father    Drug abuse Daughter, Daughter    Esophageal cancer Father    Heart disease Sister, Maternal Grandmother    Suicide Daughter          Tobacco Use    Smoking status: Never    Smokeless tobacco: Never   Substance and Sexual Activity    Alcohol use: No     Alcohol/week: 0.0 standard drinks    Drug use: No    Sexual activity: Yes     Partners: Male     Birth control/protection: None     Comment:  19 years since 1999     Review of Systems   Constitutional:  Positive for chills (occasional). Negative for fever.   HENT:  Negative for congestion, sore throat and trouble swallowing.    Eyes:  Negative for visual disturbance.   Respiratory:  Negative for cough, shortness of breath and wheezing.    Cardiovascular:  Negative for chest pain,  palpitations and leg swelling.   Gastrointestinal:  Negative for abdominal pain, blood in stool, constipation, diarrhea, nausea and vomiting.   Genitourinary:  Positive for flank pain (L-sided from nephrostomy site).   Musculoskeletal:  Negative for arthralgias and myalgias.   Skin:  Negative for rash.   Neurological:  Negative for dizziness, light-headedness, numbness and headaches.   Psychiatric/Behavioral:  Negative for agitation and confusion.    Objective:     Vital Signs (Most Recent):  Temp: 97.5 °F (36.4 °C) (02/14/23 0718)  Pulse: 84 (02/14/23 0925)  Resp: 20 (02/14/23 0925)  BP: (!) 102/56 (02/14/23 0925)  SpO2: 100 % (02/14/23 0925)   Vital Signs (24h Range):  Temp:  [96.2 °F (35.7 °C)-98.6 °F (37 °C)] 97.5 °F (36.4 °C)  Pulse:  [71-88] 84  Resp:  [12-20] 20  SpO2:  [95 %-100 %] 100 %  BP: ()/(51-85) 102/56     Weight: 85.7 kg (189 lb)  Body mass index is 27.91 kg/m².    Physical Exam  Constitutional:       General: She is not in acute distress.     Appearance: She is well-developed. She is not ill-appearing.   HENT:      Head: Normocephalic and atraumatic.      Mouth/Throat:      Pharynx: No oropharyngeal exudate.   Eyes:      Conjunctiva/sclera: Conjunctivae normal.      Pupils: Pupils are equal, round, and reactive to light.   Cardiovascular:      Rate and Rhythm: Normal rate and regular rhythm.      Heart sounds: Normal heart sounds. No murmur heard.  Pulmonary:      Effort: Pulmonary effort is normal. No respiratory distress.      Breath sounds: Normal breath sounds. No wheezing or rales.   Abdominal:      General: Bowel sounds are normal. There is no distension.      Palpations: Abdomen is soft.      Tenderness: There is no abdominal tenderness. There is no guarding.      Comments: RLQ ileostomy in place  LLQ urostomy in place   Genitourinary:     Comments: R nephrostomy in place with drainage of clear yellow fluid  L nephrostomy site open with scant drainage of yellow fluid, no nephrostomy  tube in place  No purulence/bleeding from either site    Bilateral flank PCN insertion sites with scant drainage of clear yellow fluid; no purulent or bleeding noted   Musculoskeletal:         General: No tenderness.      Cervical back: Normal range of motion and neck supple.   Skin:     General: Skin is warm and dry.      Findings: No rash.   Neurological:      General: No focal deficit present.      Mental Status: She is alert and oriented to person, place, and time. Mental status is at baseline.      Cranial Nerves: No cranial nerve deficit.      Motor: No abnormal muscle tone.   Psychiatric:         Behavior: Behavior normal.       Significant Labs: All pertinent labs within the past 24 hours have been reviewed.  CBC:   Recent Labs   Lab 02/13/23  1608 02/13/23  1617 02/14/23  0412   WBC 9.44  --  11.17   HGB 13.9  --  14.0   HCT 47.3 46 48.7*     --  196       CMP:   Recent Labs   Lab 02/14/23  0412      K 4.4      CO2 17*   GLU 99   BUN 25*   CREATININE 1.8*   CALCIUM 9.9   ANIONGAP 11       Significant Imaging: I have reviewed and interpreted all pertinent imaging results/findings within the past 24 hours.

## 2023-02-14 NOTE — PLAN OF CARE
Neph tube replacement complete. Pt tolerated well. VSS. No signs or symptoms of distress noted.  Sites CDI.  Pt will be transferred to MPU bed and report to be given at bedside.

## 2023-02-14 NOTE — ASSESSMENT & PLAN NOTE
IR is consulted.S/P Left nephrostomy tube replacement and exchange of right nephrostomy tube.receiving empiric IV Abx and started on IVF for mild increased Cr.

## 2023-02-14 NOTE — ASSESSMENT & PLAN NOTE
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 Afebrile, hypotensive (90s/50s). Satting well on RA. Initial labs notable for improved renal function. IR consulted with plan for replacement of nephrostomy tube, tentatively on 2/14.    Plan:  - Nephrostomy tube replacement on 2/14 with IR  - NPO at MN, hold pharmacologic VTE PPx  - Routine nephrostomy + urostomy care  - Monitor renal function

## 2023-02-14 NOTE — HPI
Ms. 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    Patient was previously well until earlier today when she bent over and felt her L nephrostomy tube dislodge. She has some mild pain around the site but otherwise no new complaints. Denies fevers/chills, cough/congestion, nausea/vomiting, changes in output.    Tobacco use: denies  EtOH use: denies  Illicit drug use: denies  Functional status: completes ADLs independently  Baseline mental status: AAOx3  Living situation: lives with daughter and     ED Course:  Afebrile, hypotensive (90s/50s). Satting well on RA. Initial labs notable for improved renal function. IR consulted with plan for replacement of nephrostomy tube, tentatively on 2/14.

## 2023-02-14 NOTE — SUBJECTIVE & OBJECTIVE
Past Medical History:   Diagnosis Date    Abnormal mammogram 08/25/2020    Acute blood loss anemia     Acute deep vein thrombosis (DVT) of lower extremity 12/09/2020    Advance care planning 04/30/2021    Anemia due to chronic blood loss     Anemia due to chronic kidney disease     Anxiety     Cardiovascular event risk -- low 09/14/2015    ASCVD 10-year risk 1.9% (with optimal risk factors 1.3%) as of 9/14/2015     Cervical cancer 2014    Chronic back pain     Colostomy care     Deep vein thrombosis     Depression     Diarrhea due to malabsorption 11/14/2018    Diarrhea due to malabsorption 11/14/2018    Difficult intubation     Disorder of kidney and ureter     DVT of lower extremity, bilateral 11/04/2020    Fibromyalgia     Fungemia 09/27/2020    Generalized abdominal pain 08/25/2020    GERD (gastroesophageal reflux disease)     Hemifacial spasm 09/16/2015    Hiatal hernia 2014    History of cervical cancer 10/11/2018    Hx of psychiatric care     Cymbalta, trazodone    Hypertension     Hypomagnesemia 11/21/2018    Lactose intolerance     Metastatic squamous cell carcinoma to lymph node 10/11/2018    Nephrostomy tube displaced     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 drug 11/14/2018    Osteoarthritis of back     Peritonitis 09/22/2020    Pseudomonas urinary tract infection 04/21/2021    Psychiatric problem     Refusal of blood transfusions as patient is Mu-ism     Schatzki's ring 09/14/2015    Seen on outside EGD 05/2014, underwent esophageal dilatation. Bx were negative.     Seizures     Sleep stage dysfunction     Noted on PSG 06/2017; negative for obstructive sleep apnea     Stroke     Urinary tract infection associated with nephrostomy catheter 06/11/2020    Wound infection after surgery 09/24/2020       Past Surgical History:   Procedure Laterality Date    ANTEGRADE NEPHROSTOGRAPHY Bilateral 9/28/2020    Procedure: Nephrostogram - antegrade;  Surgeon: Leslie Balbuena MD;  Location: [facility] OR [address] [place] [address];   Service: Urology;  Laterality: Bilateral;    ANTEGRADE NEPHROSTOGRAPHY Left 4/20/2021    Procedure: NEPHROSTOGRAM, ANTEGRADE;  Surgeon: Leslie Balbuena MD;  Location: [facility] OR 1ST FLR;  Service: Urology;  Laterality: Left;    ANTEGRADE NEPHROSTOGRAPHY Right 10/27/2022    Procedure: NEPHROSTOGRAM, ANTEGRADE;  Surgeon: Chirag Russ MD;  Location: [facility] OR 1ST FLR;  Service: Urology;  Laterality: Right;    BILATERAL OOPHORECTOMY  2015    CHOLECYSTECTOMY  11/09/2016    Done at [facility], path showed chronic cholecystitis and gallstones    cold knife conization  2014    COLECTOMY, RIGHT  9/17/2020    Procedure: COLECTOMY, RIGHT Extended;  Surgeon: Hammad Reynolds MD;  Location: [facility] OR 2ND FLR;  Service: Colon and Rectal;;    COLONOSCOPY  2014    COLONOSCOPY N/A 10/24/2016    at [facility], Dr Gutiérrez    COLONOSCOPY N/A 5/18/2018    Procedure: COLONOSCOPY;  Surgeon: Arden Gutiérrez MD;  Location: [facility] (4TH FLR);  Service: Endoscopy;  Laterality: N/A;    COLONOSCOPY N/A 7/28/2020    Procedure: COLONOSCOPY;  Surgeon: Hammad Reynolds MD;  Location: [facility] (4TH FLR);  Service: Colon and Rectal;  Laterality: N/A;  [place]id [place] 7/25    CYSTOSCOPY WITH URETEROSCOPY, RETROGRADE PYELOGRAPHY, AND INSERTION OF STENT Bilateral 3/21/2020    Procedure: CYSTOSCOPY, WITH RETROGRADE PYELOGRAM,;  Surgeon: Leslie Balbuena MD;  Location: [facility] OR 1ST FLR;  Service: Urology;  Laterality: Bilateral;    DILATION OF NEPHROSTOMY TRACT Right 10/27/2022    Procedure: DILATION, NEPHROSTOMY TRACT;  Surgeon: Chirag Russ MD;  Location: [facility] OR 1ST FLR;  Service: Urology;  Laterality: Right;    ESOPHAGOGASTRODUODENOSCOPY  2014    ESOPHAGOGASTRODUODENOSCOPY  11/18/2020    ESOPHAGOGASTRODUODENOSCOPY N/A 11/18/2020    Procedure: ESOPHAGOGASTRODUODENOSCOPY (EGD);  Surgeon: Zenon Spencer MD;  Location: [facility];  Service: Endoscopy;  Laterality: N/A;    ESOPHAGOGASTRODUODENOSCOPY N/A 12/11/2020    Procedure: EGD  (ESOPHAGOGASTRODUODENOSCOPY);  Surgeon: Juancho Muse MD;  Location: [facility] ENDO (2ND FLR);  Service: Endoscopy;  Laterality: N/A;    HYSTERECTOMY  2014    [facility] for cervical cancer    ILEOSTOMY  9/17/2020    Procedure: CREATION, ILEOSTOMY;  Surgeon: Hammad Reynolds MD;  Location: [facility] OR 2ND FLR;  Service: Colon and Rectal;;    LOOPOGRAM N/A 4/20/2021    Procedure: LOOPOGRAM;  Surgeon: Leslie Balbuena MD;  Location: [facility] OR 1ST FLR;  Service: Urology;  Laterality: N/A;    MOBILIZATION OF SPLENIC FLEXURE N/A 9/11/2020    Procedure: MOBILIZATION, COLONIC;  Surgeon: Hammad Reynolds MD;  Location: [facility] OR 2ND FLR;  Service: Colon and Rectal;  Laterality: N/A;    NEPHROSTOGRAPHY Bilateral 4/17/2021    Procedure: Nephrostogram;  Surgeon: Celeste Surgeon;  Location: [facility];  Service: Anesthesiology;  Laterality: Bilateral;    OOPHORECTOMY      PYELOSCOPY Right 10/27/2022    Procedure: PYELOSCOPY;  Surgeon: Chirag Russ MD;  [facility];  Service: Urology;  Laterality: Right;    REPLACEMENT OF NEPHROSTOMY TUBE Right 10/27/2022    Procedure: REPLACEMENT, NEPHROSTOMY TUBE;  Surgeon: Chirag Russ MD;  [facility];  Service: Urology;  Laterality: Right;    RETROPERITONEAL LYMPHADENECTOMY Right 9/17/2018    Procedure: LYMPHADENECTOMY, RETROPERITONEUM;  Surgeon: Sebas Reed MD;  [facility];  Service: General;  Laterality: Right;  ILIAC    URETEROSCOPIC REMOVAL OF URETERIC CALCULUS Right 10/27/2022    Procedure: REMOVAL, CALCULUS, URETER, URETEROSCOPIC;  Surgeon: Chirag Russ MD;  Location: [facility] OR [facility] FLR;  Service: Urology;  Laterality: Right;    URETEROSCOPY Right 10/27/2022    Procedure: URETEROSCOPY-ANTEGRADE;  Surgeon: Chirag Russ MD;  Location: [facility] OR [facility] FLR;  Service: Urology;  Laterality: Right;       Review of patient's allergies indicates:   Allergen Reactions    Bee sting [allergen ext-venom-honey bee]      Rash      Grass pollen-bermuda,  standard      rash       No current facility-administered medications on file prior to encounter.     Current Outpatient Medications on File Prior to Encounter   Medication Sig    acetaminophen (TYLENOL) 325 MG tablet Take 2 tablets (650 mg total) by mouth every 6 (six) hours as needed for Pain.    gabapentin (NEURONTIN) 100 MG capsule Take 2 capsules (200 mg total) by mouth every evening.    ketorolac (TORADOL) 10 mg tablet Take 1 tablet (10 mg total) by mouth every 6 (six) hours as needed for Pain.    melatonin (MELATIN) 3 mg tablet Take 2 tablets (6 mg total) by mouth nightly as needed for Insomnia.    mirtazapine (REMERON SOL-TAB) 15 MG disintegrating tablet Dissolve 1 tablet (15 mg total) by mouth nightly.    sodium bicarbonate 650 MG tablet Take 2 tablets (1,300 mg total) by mouth 2 (two) times daily.    sucralfate (CARAFATE) 1 gram tablet Take 1 tablet (1 g total) by mouth 4 (four) times daily before meals and nightly.    traMADoL (ULTRAM) 50 mg tablet Take 1 tablet (50 mg total) by mouth every 6 (six) hours.     Family History       Problem Relation (Age of Onset)    Breast cancer Maternal Aunt    Cancer Father, Mother    Cervical cancer Mother    Colon cancer Father    Drug abuse Daughter, Daughter    Esophageal cancer Father    Heart disease Sister, Maternal Grandmother    Suicide Daughter          Tobacco Use    Smoking status: Never    Smokeless tobacco: Never   Substance and Sexual Activity    Alcohol use: No     Alcohol/week: 0.0 standard drinks    Drug use: No    Sexual activity: Yes     Partners: Male     Birth control/protection: None     Comment:  19 years since 1999     Review of Systems   Constitutional:  Positive for chills (occasional). Negative for fever.   HENT:  Negative for congestion, sore throat and trouble swallowing.    Eyes:  Negative for visual disturbance.   Respiratory:  Negative for cough, shortness of breath and wheezing.    Cardiovascular:  Negative for chest pain,  palpitations and leg swelling.   Gastrointestinal:  Negative for abdominal pain, blood in stool, constipation, diarrhea, nausea and vomiting.   Genitourinary:  Positive for flank pain (L-sided from nephrostomy site).   Musculoskeletal:  Negative for arthralgias and myalgias.   Skin:  Negative for rash.   Neurological:  Positive for headaches. Negative for dizziness, light-headedness and numbness.   Psychiatric/Behavioral:  Negative for agitation and confusion.    Objective:     Vital Signs (Most Recent):  Temp: 98 °F (36.7 °C) (02/13/23 2028)  Pulse: 76 (02/13/23 2103)  Resp: 16 (02/13/23 2028)  BP: (!) 141/65 (02/13/23 2103)  SpO2: 99 % (02/13/23 2103)   Vital Signs (24h Range):  Temp:  [97.7 °F (36.5 °C)-98 °F (36.7 °C)] 98 °F (36.7 °C)  Pulse:  [73-88] 76  Resp:  [16] 16  SpO2:  [98 %-100 %] 99 %  BP: ()/(51-77) 141/65     Weight: 85.7 kg (189 lb)  Body mass index is 27.91 kg/m².    Physical Exam  Constitutional:       General: She is not in acute distress.     Appearance: She is well-developed. She is not ill-appearing.   HENT:      Head: Normocephalic and atraumatic.      Mouth/Throat:      Pharynx: No oropharyngeal exudate.   Eyes:      Conjunctiva/sclera: Conjunctivae normal.      Pupils: Pupils are equal, round, and reactive to light.   Cardiovascular:      Rate and Rhythm: Normal rate and regular rhythm.      Heart sounds: Normal heart sounds. No murmur heard.  Pulmonary:      Effort: Pulmonary effort is normal. No respiratory distress.      Breath sounds: Normal breath sounds. No wheezing or rales.   Abdominal:      General: Bowel sounds are normal. There is no distension.      Palpations: Abdomen is soft.      Tenderness: There is no abdominal tenderness. There is no guarding.      Comments: RLQ ileostomy in place  LLQ urostomy in place   Genitourinary:     Comments: R nephrostomy in place with drainage of clear yellow fluid  L nephrostomy site open with scant drainage of yellow fluid, no  nephrostomy tube in place  No purulence/bleeding from either site    Bilateral flank PCN insertion sites with scant drainage of clear yellow fluid; no purulent or bleeding noted   Musculoskeletal:         General: No tenderness.      Cervical back: Normal range of motion and neck supple.   Skin:     General: Skin is warm and dry.      Findings: No rash.   Neurological:      General: No focal deficit present.      Mental Status: She is alert and oriented to person, place, and time. Mental status is at baseline.      Cranial Nerves: No cranial nerve deficit.      Motor: No abnormal muscle tone.   Psychiatric:         Behavior: Behavior normal.       Significant Labs: All pertinent labs within the past 24 hours have been reviewed.  CBC:   Recent Labs   Lab 02/13/23  1608 02/13/23  1617   WBC 9.44  --    HGB 13.9  --    HCT 47.3 46     --      CMP: No results for input(s): NA, K, CL, CO2, GLU, BUN, CREATININE, CALCIUM, PROT, ALBUMIN, BILITOT, ALKPHOS, AST, ALT, ANIONGAP, EGFRNONAA in the last 48 hours.    Invalid input(s): ESTGFAFR[place]    Significant Imaging: I have reviewed and interpreted all pertinent imaging results/findings within the past 24 hours.

## 2023-02-14 NOTE — ASSESSMENT & PLAN NOTE
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 Afebrile, hypotensive (90s/50s). Satting well on RA. Initial labs notable for improved renal function. IR consulted with plan for replacement of nephrostomy tube, tentatively on 2/14.    IR is consulted.S/P Left nephrostomy tube replacement and exchange of right nephrostomy tube.receiving empiric IV Abx and started on IVF for mild increased Cr.

## 2023-02-14 NOTE — H&P
[facility] - Emergency Dept  [facility]  History & Physical    Patient Name: Edita Riley  MRN: [id number]  Patient Class: OP- Observation  Admission Date: 2/13/2023  Attending Physician: Stephanie Rosa MD   Primary Care Provider: Primary Doctor No         Patient information was obtained from patient, past medical records and ER records.     Subjective:     Principal Problem:Displacement of nephrostomy tube    Chief Complaint:   Chief Complaint   Patient presents with    nephrostomy bag displacement     Pt's nephrostomy bag on left side was dislodged this morning.         HPI: Ms. 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    Patient was previously well until earlier today when she bent over and felt her L nephrostomy tube dislodge. She has some mild pain around the site but otherwise no new complaints. Denies fevers/chills, cough/congestion, nausea/vomiting, changes in output.    Tobacco use: denies  EtOH use: denies  Illicit drug use: denies  Functional status: completes ADLs independently  Baseline mental status: AAOx3  Living situation: lives with daughter and     ED Course:  Afebrile, hypotensive (90s/50s). Satting well on RA. Initial labs notable for improved renal function. IR consulted with plan for replacement of nephrostomy tube, tentatively on 2/14.      Past Medical History:   Diagnosis Date    Abnormal mammogram 08/25/2020    Acute blood loss anemia     Acute deep vein thrombosis (DVT) of lower extremity 12/09/2020    Advance care planning 04/30/2021    Anemia due to chronic blood loss     Anemia due to chronic kidney disease     Anxiety     Cardiovascular event risk -- low 09/14/2015    ASCVD 10-year  risk 1.9% (with optimal risk factors 1.3%) as of 9/14/2015     Cervical cancer 2014    Chronic back pain     Colostomy care     Deep vein thrombosis     Depression     Diarrhea due to malabsorption 11/14/2018    Diarrhea due to malabsorption 11/14/2018    Difficult intubation     Disorder of kidney and ureter     DVT of lower extremity, bilateral 11/04/2020    Fibromyalgia     Fungemia 09/27/2020    Generalized abdominal pain 08/25/2020    GERD (gastroesophageal reflux disease)     Hemifacial spasm 09/16/2015    Hiatal hernia 2014    History of cervical cancer 10/11/2018    Hx of psychiatric care     Cymbalta, trazodone    Hypertension     Hypomagnesemia 11/21/2018    Lactose intolerance     Metastatic squamous cell carcinoma to lymph node 10/11/2018    Nephrostomy tube displaced     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 drug 11/14/2018    Osteoarthritis of back     Peritonitis 09/22/2020    Pseudomonas urinary tract infection 04/21/2021    Psychiatric problem     Refusal of blood transfusions as patient is Orthodox     Schatzki's ring 09/14/2015    Seen on outside EGD 05/2014, underwent esophageal dilatation. Bx were negative.     Seizures     Sleep stage dysfunction     Noted on PSG 06/2017; negative for obstructive sleep apnea     Stroke     Urinary tract infection associated with nephrostomy catheter 06/11/2020    Wound infection after surgery 09/24/2020       Past Surgical History:   Procedure Laterality Date    ANTEGRADE NEPHROSTOGRAPHY Bilateral 9/28/2020    Procedure: Nephrostogram - antegrade;  Surgeon: Leslie Balbuena MD;  Location: [address];  Service: Urology;  Laterality: Bilateral;    ANTEGRADE NEPHROSTOGRAPHY Left 4/20/2021    Procedure: NEPHROSTOGRAM, ANTEGRADE;  Surgeon: Leslie Balbuena MD;  Location: [address];  Service: Urology;  Laterality: Left;    ANTEGRADE NEPHROSTOGRAPHY Right 10/27/2022    Procedure: NEPHROSTOGRAM, ANTEGRADE;  Surgeon: Chirag Russ MD;  Location:  [facility] OR 1ST FLR;  Service: Urology;  Laterality: Right;    BILATERAL OOPHORECTOMY  2015    CHOLECYSTECTOMY  11/09/2016    Done at [facility], path showed chronic cholecystitis and gallstones    cold knife conization  2014    COLECTOMY, RIGHT  9/17/2020    Procedure: COLECTOMY, RIGHT Extended;  Surgeon: Hammad Reynolds MD;  Location: [facility] OR 2ND FLR;  Service: Colon and Rectal;;    COLONOSCOPY  2014    [facility] N[facility]2016    [facility]    COLONOSCOPY N/A 5/18/2018    Procedure: COLONOSCOPY;  Surgeon: Arden Gutiérrez MD;  Location: [facility] (4TH FLR);  Service: Endoscopy;  Laterality: N/A;    COLONOSCOPY N/A 7/28/2020    Procedure: COLONOSCOPY;  Surgeon: Hammad Reynolds MD;  Location: [facility] (4TH FLR);  Service: Colon and Rectal;  Laterality: N/A;  covid test [place] 7/25    CYSTOSCOPY WITH URETEROSCOPY, RETROGRADE PYELOGRAPHY, AND INSERTION OF STENT Bilateral 3/21/2020    Procedure: CYSTOSCOPY, WITH RETROGRADE PYELOGRAM,;  Surgeon: Leslie Balbuena MD;  Location: [facility] OR 1ST FLR;  Service: Urology;  Laterality: Bilateral;    DILATION OF NEPHROSTOMY TRACT Right 10/27/2022    Procedure: DILATION, NEPHROSTOMY TRACT;  Surgeon: Chirag Russ MD;  Location: [facility] OR 1ST FLR;  Service: Urology;  Laterality: Right;    ESOPHAGOGASTRODUODENOSCOPY  2014    ESOPHAGOGASTRODUODENOSCOPY  11/18/2020    ESOPHAGOGASTRODUODENOSCOPY N/A 11/18/2020    Procedure: ESOPHAGOGASTRODUODENOSCOPY (EGD);  Surgeon: Zenon Spencer MD;  Location: [facility];  Service: Endoscopy;  Laterality: N/A;    ESOPHAGOGASTRODUODENOSCOPY N/A 12/11/2020    Procedure: EGD (ESOPHAGOGASTRODUODENOSCOPY);  Surgeon: Juancho Muse MD;  Location: [facility] (2ND FLR);  Service: Endoscopy;  Laterality: N/A;    HYSTERECTOMY  2014    TV for cervical cancer    ILEOSTOMY  9/17/2020    Procedure: CREATION, ILEOSTOMY;  Surgeon: Hammad Reynolds MD;  Location: [facility] OR 2ND FLR;  Service: Colon and Rectal;;    LOOPOGRAM N/A 4/20/2021    Procedure:  LOOPOGRAM;  Surgeon: Leslie Balbuena MD;  Location: [facility] OR 1ST FLR;  Service: Urology;  Laterality: N/A;    MOBILIZATION OF SPLENIC FLEXURE N/A 9/11/2020    Procedure: MOBILIZATION, COLONIC;  Surgeon: Hammad Reynolds MD;  Location: [facility] OR 2ND FLR;  Service: Colon and Rectal;  Laterality: N/A;    NEPHROSTOGRAPHY Bilateral 4/17/2021    Procedure: Nephrostogram;  Surgeon: Celeste Surgeon;  Location: [facility];  Service: Anesthesiology;  Laterality: Bilateral;    OOPHORECTOMY      PYELOSCOPY Right 10/27/2022    Procedure: PYELOSCOPY;  Surgeon: Chirag Russ MD;  Location: [facility] OR [facility] FLR;  Service: Urology;  Laterality: Right;    REPLACEMENT OF NEPHROSTOMY TUBE Right 10/27/2022    Procedure: REPLACEMENT, NEPHROSTOMY TUBE;  Surgeon: Chirag Russ MD;  Location: [facility] OR [facility] FLR;  Service: Urology;  Laterality: Right;    RETROPERITONEAL LYMPHADENECTOMY Right 9/17/2018    Procedure: LYMPHADENECTOMY, RETROPERITONEUM;  Surgeon: Sebas Reed MD;  Location: [facility] OR 2ND FLR;  Service: General;  Laterality: Right;  ILIAC    URETEROSCOPIC REMOVAL OF URETERIC CALCULUS Right 10/27/2022    Procedure: REMOVAL, CALCULUS, URETER, URETEROSCOPIC;  Surgeon: Chirag Russ MD;  [facility];  Service: Urology;  Laterality: Right;    URETEROSCOPY Right 10/27/2022    Procedure: URETEROSCOPY-ANTEGRADE;  Surgeon: Chirag Russ MD;  [facility];  Service: Urology;  Laterality: Right;       Review of patient's allergies indicates:   Allergen Reactions    Bee sting [allergen ext-venom-honey bee]      Rash      Grass pollen-bermuda, standard      rash       No current facility-administered medications on file prior to encounter.     Current Outpatient Medications on File Prior to Encounter   Medication Sig    acetaminophen (TYLENOL) 325 MG tablet Take 2 tablets (650 mg total) by mouth every 6 (six) hours as needed for Pain.    gabapentin (NEURONTIN) 100 MG capsule Take 2 capsules (200 mg total) by  mouth every evening.    ketorolac (TORADOL) 10 mg tablet Take 1 tablet (10 mg total) by mouth every 6 (six) hours as needed for Pain.    melatonin (MELATIN) 3 mg tablet Take 2 tablets (6 mg total) by mouth nightly as needed for Insomnia.    mirtazapine (REMERON SOL-TAB) 15 MG disintegrating tablet Dissolve 1 tablet (15 mg total) by mouth nightly.    sodium bicarbonate 650 MG tablet Take 2 tablets (1,300 mg total) by mouth 2 (two) times daily.    sucralfate (CARAFATE) 1 gram tablet Take 1 tablet (1 g total) by mouth 4 (four) times daily before meals and nightly.    traMADoL (ULTRAM) 50 mg tablet Take 1 tablet (50 mg total) by mouth every 6 (six) hours.     Family History       Problem Relation (Age of Onset)    Breast cancer Maternal Aunt    Cancer Father, Mother    Cervical cancer Mother    Colon cancer Father    Drug abuse Daughter, Daughter    Esophageal cancer Father    Heart disease Sister, Maternal Grandmother    Suicide Daughter          Tobacco Use    Smoking status: Never    Smokeless tobacco: Never   Substance and Sexual Activity    Alcohol use: No     Alcohol/week: 0.0 standard drinks    Drug use: No    Sexual activity: Yes     Partners: Male     Birth control/protection: None     Comment:  19 years since 1999     Review of Systems   Constitutional:  Positive for chills (occasional). Negative for fever.   HENT:  Negative for congestion, sore throat and trouble swallowing.    Eyes:  Negative for visual disturbance.   Respiratory:  Negative for cough, shortness of breath and wheezing.    Cardiovascular:  Negative for chest pain, palpitations and leg swelling.   Gastrointestinal:  Negative for abdominal pain, blood in stool, constipation, diarrhea, nausea and vomiting.   Genitourinary:  Positive for flank pain (L-sided from nephrostomy site).   Musculoskeletal:  Negative for arthralgias and myalgias.   Skin:  Negative for rash.   Neurological:  Positive for headaches. Negative for dizziness,  light-headedness and numbness.   Psychiatric/Behavioral:  Negative for agitation and confusion.    Objective:     Vital Signs (Most Recent):  Temp: 98 °F (36.7 °C) (02/13/23 2028)  Pulse: 76 (02/13/23 2103)  Resp: 16 (02/13/23 2028)  BP: (!) 141/65 (02/13/23 2103)  SpO2: 99 % (02/13/23 2103)   Vital Signs (24h Range):  Temp:  [97.7 °F (36.5 °C)-98 °F (36.7 °C)] 98 °F (36.7 °C)  Pulse:  [73-88] 76  Resp:  [16] 16  SpO2:  [98 %-100 %] 99 %  BP: ()/(51-77) 141/65     Weight: 85.7 kg (189 lb)  Body mass index is 27.91 kg/m².    Physical Exam  Constitutional:       General: She is not in acute distress.     Appearance: She is well-developed. She is not ill-appearing.   HENT:      Head: Normocephalic and atraumatic.      Mouth/Throat:      Pharynx: No oropharyngeal exudate.   Eyes:      Conjunctiva/sclera: Conjunctivae normal.      Pupils: Pupils are equal, round, and reactive to light.   Cardiovascular:      Rate and Rhythm: Normal rate and regular rhythm.      Heart sounds: Normal heart sounds. No murmur heard.  Pulmonary:      Effort: Pulmonary effort is normal. No respiratory distress.      Breath sounds: Normal breath sounds. No wheezing or rales.   Abdominal:      General: Bowel sounds are normal. There is no distension.      Palpations: Abdomen is soft.      Tenderness: There is no abdominal tenderness. There is no guarding.      Comments: RLQ ileostomy in place  LLQ urostomy in place   Genitourinary:     Comments: R nephrostomy in place with drainage of clear yellow fluid  L nephrostomy site open with scant drainage of yellow fluid, no nephrostomy tube in place  No purulence/bleeding from either site    Bilateral flank PCN insertion sites with scant drainage of clear yellow fluid; no purulent or bleeding noted   Musculoskeletal:         General: No tenderness.      Cervical back: Normal range of motion and neck supple.   Skin:     General: Skin is warm and dry.      Findings: No rash.   Neurological:       General: No focal deficit present.      Mental Status: She is alert and oriented to person, place, and time. Mental status is at baseline.      Cranial Nerves: No cranial nerve deficit.      Motor: No abnormal muscle tone.   Psychiatric:         Behavior: Behavior normal.       Significant Labs: All pertinent labs within the past 24 hours have been reviewed.  CBC:   Recent Labs   Lab 02/13/23  1608 02/13/23  1617   WBC 9.44  --    HGB 13.9  --    HCT 47.3 46     --      CMP: No results for input(s): NA, K, CL, CO2, GLU, BUN, CREATININE, CALCIUM, PROT, ALBUMIN, BILITOT, ALKPHOS, AST, ALT, ANIONGAP, EGFRNONAA in the last 48 hours.    Invalid input(s): ESTGFAFRICA    Significant Imaging: I have reviewed and interpreted all pertinent imaging results/findings within the past 24 hours.    Assessment/Plan:     *Displacement of nephrostomy tube  59 yo F with BL ureteral obstruction 2/2 radiation cystitis s/p urostomy + BL nephrostomy tube placement admitted with L nephrostomy tube dislodgement. Afebrile, hypotensive (90s/50s). Satting well on RA. Initial labs notable for improved renal function. IR consulted with plan for replacement of nephrostomy tube, tentatively on 2/14.    Plan:  - Nephrostomy tube replacement on 2/14 with IR  - NPO at [facility], hold pharmacologic VTE PPx  - Routine nephrostomy + urostomy care  - Monitor renal function    Cervical cancer  Radiation cystitis  Bilateral ureteral obstruction  Nephrostomy status  Ileostomy in place  Presence of urostomy  H/o cervical cancer s/p HYST/chemo/XRT  Complicated by radiation cystitis s/p colon conduit  Developed bowel perforation from colon conduit anastamosis s/p hemicolectomy + ileostomy  S/p urostomy + BL nephrostomy tubes      VTE Risk Mitigation (From admission, onward)           Ordered     Reason for No Pharmacological VTE Prophylaxis  Once        Question:  Reasons:  Answer:  Physician Provided (leave comment)  Comment:  procedure    02/13/23 2315     IP  VTE HIGH RISK PATIENT  Once         02/13/23 2315     Place sequential compression device  Until discontinued         02/13/23 2315     Place sequential compression device  Until discontinued         02/13/23 2237                       Benson Sabillon MD  Department of Hospital Medicine   [facility] - Emergency Dept

## 2023-02-14 NOTE — HOSPITAL COURSE
Pt was noted to be in renal failure on admission and IV fluids were started.     IR is consulted. Pt underwent Successful exchange of bilateral nephrostomy tubes on 2/14.     Patient became septic after nephrostomy exchange with fever 101.8, and tachycardia. UA was positive. Urine cx and blood cx were collected. She was started on broad spectrum abx with ceftrixone and linezolid. Her sepsis eventually resolved. Urine cx grew klebsiella that is resistent to ceftriaxone. Ceftriaxone and linezolid were discontinued and she was started on cipro instead.     Patient kidney failure resolved.  Her sepsis resolved.  She will be discharged on 6 more days of cipro to finish 7 days course of abx.     Was seen by PT OT who recommended home with no home care.  Patient agrees that she does not need any home care outpatient.  She however complain of almost daily migraine headache.  She was treated for migraine during this hospitalization and will be referred to Neurology outpt for her migraine headache.  Patient also has an appointment with urology coming up for her  problems.      Physical exam;  Vitals; reviewed  General; no acute distress  HENT: PERRLA, NC/AT  CV; RRR  Resp; CTA bilateral lung fields.   Abdomen; soft, + bowel sounds.     Discharge diagnosis:  1) Displacement of nephrostomy tube  2) Sepsis  3) Complicated UTI (urinary tract infection)  4) Acute renal failure    5) Migraine without aura and without status migrainosus, not intractable  Presence of urostomy  Ileostomy in place   Bilateral ureteral obstruction  Radiation cystitis  Cervical cancer

## 2023-02-14 NOTE — PLAN OF CARE
Pt arrived to  for nephrostomy tube replacement. Pt oriented to unit and staff. Plan of care reviewed with patient, patient verbalizes understanding. Comfort measures utilized. Pt safely transferred from stretcher to procedural table. Fall risk reviewed with patient, fall risk interventions maintained. Safety strap applied, positioner pillows utilized to minimize pressure points. Blankets applied. Pt prepped and draped utilizing standard sterile technique. Patient placed on continuous monitoring, as required by sedation policy. Timeouts completed utilizing standard universal time-out, per department and facility policy. RN to remain at bedside, continuous monitoring maintained. Pt resting comfortably. Denies pain/discomfort. Will continue to monitor. See flow sheets for monitoring, medication administration, and updates.

## 2023-02-15 PROBLEM — A41.9 SEPSIS: Status: ACTIVE | Noted: 2023-02-15

## 2023-02-15 LAB
ANION GAP SERPL CALC-SCNC: 10 MMOL/L (ref 8–16)
BUN SERPL-MCNC: 17 MG/DL (ref 6–20)
CALCIUM SERPL-MCNC: 8.6 MG/DL (ref 8.7–10.5)
CHLORIDE SERPL-SCNC: 111 MMOL/L (ref 95–110)
CO2 SERPL-SCNC: 18 MMOL/L (ref 23–29)
CREAT SERPL-MCNC: 1.5 MG/DL (ref 0.5–1.4)
ERYTHROCYTE [DISTWIDTH] IN BLOOD BY AUTOMATED COUNT: 14.6 % (ref 11.5–14.5)
EST. GFR  (NO RACE VARIABLE): 39.6 ML/MIN/1.73 M^2
GLUCOSE SERPL-MCNC: 102 MG/DL (ref 70–110)
HCT VFR BLD AUTO: 38.9 % (ref 37–48.5)
HGB BLD-MCNC: 11.6 G/DL (ref 12–16)
MAGNESIUM SERPL-MCNC: 1.6 MG/DL (ref 1.6–2.6)
MCH RBC QN AUTO: 26.9 PG (ref 27–31)
MCHC RBC AUTO-ENTMCNC: 29.8 G/DL (ref 32–36)
MCV RBC AUTO: 90 FL (ref 82–98)
PHOSPHATE SERPL-MCNC: 2.6 MG/DL (ref 2.7–4.5)
PLATELET # BLD AUTO: 245 K/UL (ref 150–450)
PMV BLD AUTO: 10.6 FL (ref 9.2–12.9)
POTASSIUM SERPL-SCNC: 3.5 MMOL/L (ref 3.5–5.1)
RBC # BLD AUTO: 4.32 M/UL (ref 4–5.4)
SODIUM SERPL-SCNC: 139 MMOL/L (ref 136–145)
WBC # BLD AUTO: 11.61 K/UL (ref 3.9–12.7)

## 2023-02-15 PROCEDURE — 36415 COLL VENOUS BLD VENIPUNCTURE: CPT | Performed by: STUDENT IN AN ORGANIZED HEALTH CARE EDUCATION/TRAINING PROGRAM

## 2023-02-15 PROCEDURE — 99232 PR SUBSEQUENT HOSPITAL CARE,LEVL II: ICD-10-PCS | Mod: ,,, | Performed by: HOSPITALIST

## 2023-02-15 PROCEDURE — 94760 N-INVAS EAR/PLS OXIMETRY 1: CPT

## 2023-02-15 PROCEDURE — 63600175 PHARM REV CODE 636 W HCPCS: Performed by: HOSPITALIST

## 2023-02-15 PROCEDURE — 80048 BASIC METABOLIC PNL TOTAL CA: CPT | Performed by: STUDENT IN AN ORGANIZED HEALTH CARE EDUCATION/TRAINING PROGRAM

## 2023-02-15 PROCEDURE — 84100 ASSAY OF PHOSPHORUS: CPT | Performed by: STUDENT IN AN ORGANIZED HEALTH CARE EDUCATION/TRAINING PROGRAM

## 2023-02-15 PROCEDURE — 99232 SBSQ HOSP IP/OBS MODERATE 35: CPT | Mod: ,,, | Performed by: HOSPITALIST

## 2023-02-15 PROCEDURE — 25000003 PHARM REV CODE 250: Performed by: STUDENT IN AN ORGANIZED HEALTH CARE EDUCATION/TRAINING PROGRAM

## 2023-02-15 PROCEDURE — 83735 ASSAY OF MAGNESIUM: CPT | Performed by: STUDENT IN AN ORGANIZED HEALTH CARE EDUCATION/TRAINING PROGRAM

## 2023-02-15 PROCEDURE — 11000001 HC ACUTE MED/SURG PRIVATE ROOM

## 2023-02-15 PROCEDURE — 25000003 PHARM REV CODE 250: Performed by: HOSPITALIST

## 2023-02-15 PROCEDURE — 85027 COMPLETE CBC AUTOMATED: CPT | Performed by: STUDENT IN AN ORGANIZED HEALTH CARE EDUCATION/TRAINING PROGRAM

## 2023-02-15 RX ORDER — SODIUM CHLORIDE 450 MG/100ML
INJECTION, SOLUTION INTRAVENOUS CONTINUOUS
Status: DISCONTINUED | OUTPATIENT
Start: 2023-02-15 | End: 2023-02-18

## 2023-02-15 RX ADMIN — ACETAMINOPHEN 650 MG: 325 TABLET ORAL at 06:02

## 2023-02-15 RX ADMIN — SODIUM CHLORIDE: 9 INJECTION, SOLUTION INTRAVENOUS at 05:02

## 2023-02-15 RX ADMIN — SODIUM CHLORIDE: 4.5 INJECTION, SOLUTION INTRAVENOUS at 01:02

## 2023-02-15 RX ADMIN — SODIUM BICARBONATE 1300 MG: 650 TABLET ORAL at 08:02

## 2023-02-15 RX ADMIN — GABAPENTIN 200 MG: 100 CAPSULE ORAL at 08:02

## 2023-02-15 RX ADMIN — CEFTRIAXONE 1 G: 1 INJECTION, POWDER, FOR SOLUTION INTRAMUSCULAR; INTRAVENOUS at 06:02

## 2023-02-15 RX ADMIN — LINEZOLID 600 MG: 600 TABLET, FILM COATED ORAL at 08:02

## 2023-02-15 RX ADMIN — LINEZOLID 600 MG: 600 TABLET, FILM COATED ORAL at 09:02

## 2023-02-15 RX ADMIN — CEFTRIAXONE 1 G: 1 INJECTION, POWDER, FOR SOLUTION INTRAMUSCULAR; INTRAVENOUS at 05:02

## 2023-02-15 RX ADMIN — ACETAMINOPHEN 650 MG: 325 TABLET ORAL at 05:02

## 2023-02-15 NOTE — SUBJECTIVE & OBJECTIVE
Past Medical History:   Diagnosis Date    Abnormal mammogram 08/25/2020    Acute blood loss anemia     Acute deep vein thrombosis (DVT) of lower extremity 12/09/2020    Advance care planning 04/30/2021    Anemia due to chronic blood loss     Anemia due to chronic kidney disease     Anxiety     Cardiovascular event risk -- low 09/14/2015    ASCVD 10-year risk 1.9% (with optimal risk factors 1.3%) as of 9/14/2015     Cervical cancer 2014    Chronic back pain     Colostomy care     Deep vein thrombosis     Depression     Diarrhea due to malabsorption 11/14/2018    Diarrhea due to malabsorption 11/14/2018    Difficult intubation     Disorder of kidney and ureter     DVT of lower extremity, bilateral 11/04/2020    Fibromyalgia     Fungemia 09/27/2020    Generalized abdominal pain 08/25/2020    GERD (gastroesophageal reflux disease)     Hemifacial spasm 09/16/2015    Hiatal hernia 2014    History of cervical cancer 10/11/2018    Hx of psychiatric care     Cymbalta, trazodone    Hypertension     Hypomagnesemia 11/21/2018    Lactose intolerance     Metastatic squamous cell carcinoma to lymph node 10/11/2018    Nephrostomy tube displaced     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 drug 11/14/2018    Osteoarthritis of back     Peritonitis 09/22/2020    Pseudomonas urinary tract infection 04/21/2021    Psychiatric problem     Refusal of blood transfusions as patient is Religion     Schatzki's ring 09/14/2015    Seen on outside EGD 05/2014, underwent esophageal dilatation. Bx were negative.     Seizures     Sleep stage dysfunction     Noted on PSG 06/2017; negative for obstructive sleep apnea     Stroke     Urinary tract infection associated with nephrostomy catheter 06/11/2020    Wound infection after surgery 09/24/2020       Past Surgical History:   Procedure Laterality Date    ANTEGRADE NEPHROSTOGRAPHY Bilateral 9/28/2020    Procedure: Nephrostogram - antegrade;  Surgeon: Leslie Balbuena MD;  Location: [facility] OR [address] [place] [address];   Service: Urology;  Laterality: Bilateral;    ANTEGRADE NEPHROSTOGRAPHY Left 4/20/2021    Procedure: NEPHROSTOGRAM, ANTEGRADE;  Surgeon: Leslie Balbuena MD;  Location: [facility] OR 1ST FLR;  Service: Urology;  Laterality: Left;    ANTEGRADE NEPHROSTOGRAPHY Right 10/27/2022    Procedure: NEPHROSTOGRAM, ANTEGRADE;  Surgeon: Chirag Russ MD;  Location: [facility] OR 1ST FLR;  Service: Urology;  Laterality: Right;    BILATERAL OOPHORECTOMY  2015    CHOLECYSTECTOMY  11/09/2016    Done at [facility], path showed chronic cholecystitis and gallstones    cold knife conization  2014    COLECTOMY, RIGHT  9/17/2020    Procedure: COLECTOMY, RIGHT Extended;  Surgeon: Hammad Reynolds MD;  Location: [facility] OR 2ND FLR;  Service: Colon and Rectal;;    COLONOSCOPY  2014    COLONOSCOPY N/A 10/24/2016    at [facility], Dr Gutiérrez    COLONOSCOPY N/A 5/18/2018    Procedure: COLONOSCOPY;  Surgeon: Arden Gutiérrez MD;  Location: [facility] (4TH FLR);  Service: Endoscopy;  Laterality: N/A;    COLONOSCOPY N/A 7/28/2020    Procedure: COLONOSCOPY;  Surgeon: Hammad Reynolds MD;  Location: [facility] (4TH FLR);  Service: Colon and Rectal;  Laterality: N/A;  covid test [facility] 7/25    CYSTOSCOPY WITH URETEROSCOPY, RETROGRADE PYELOGRAPHY, AND INSERTION OF STENT Bilateral 3/21/2020    Procedure: CYSTOSCOPY, WITH RETROGRADE PYELOGRAM,;  Surgeon: Leslie Balbuena MD;  Location: [facility] OR 1ST FLR;  Service: Urology;  Laterality: Bilateral;    DILATION OF NEPHROSTOMY TRACT Right 10/27/2022    Procedure: DILATION, NEPHROSTOMY TRACT;  Surgeon: Chirag Russ MD;  Location: [facility] OR 1ST FLR;  Service: Urology;  Laterality: Right;    ESOPHAGOGASTRODUODENOSCOPY  2014    ESOPHAGOGASTRODUODENOSCOPY  11/18/2020    ESOPHAGOGASTRODUODENOSCOPY N/A 11/18/2020    Procedure: ESOPHAGOGASTRODUODENOSCOPY (EGD);  Surgeon: Zenon Spencer MD;  Location: [facility];  Service: Endoscopy;  Laterality: N/A;    ESOPHAGOGASTRODUODENOSCOPY N/A 12/11/2020    Procedure: EGD  (ESOPHAGOGASTRODUODENOSCOPY);  Surgeon: Juancho Muse MD;  Location: [facility] ENDO (2ND FLR);  Service: Endoscopy;  Laterality: N/A;    HYSTERECTOMY  2014    [facility] for cervical cancer    ILEOSTOMY  9/17/2020    Procedure: CREATION, ILEOSTOMY;  Surgeon: Hammad Reynolds MD;  Location: [facility] OR 2ND FLR;  Service: Colon and Rectal;;    LOOPOGRAM N/A 4/20/2021    Procedure: LOOPOGRAM;  Surgeon: Leslie Balbuena MD;  Location: [facility] OR 1ST FLR;  Service: Urology;  Laterality: N/A;    MOBILIZATION OF SPLENIC FLEXURE N/A 9/11/2020    Procedure: MOBILIZATION, COLONIC;  Surgeon: Hammad Reynolds MD;  Location: [facility] OR 2ND FLR;  Service: Colon and Rectal;  Laterality: N/A;    NEPHROSTOGRAPHY Bilateral 4/17/2021    Procedure: Nephrostogram;  Surgeon: Celeste Surgeon;  Location: [facility] CELESTE;  Service: Anesthesiology;  Laterality: Bilateral;    OOPHORECTOMY      PYELOSCOPY Right 10/27/2022    Procedure: PYELOSCOPY;  Surgeon: Chirag Russ MD;  [facility];  Service: Urology;  Laterality: Right;    REPLACEMENT OF NEPHROSTOMY TUBE Right 10/27/2022    Procedure: REPLACEMENT, NEPHROSTOMY TUBE;  Surgeon: Chirag Russ MD;  [facility];  Service: Urology;  Laterality: Right;    RETROPERITONEAL LYMPHADENECTOMY Right 9/17/2018    Procedure: LYMPHADENECTOMY, RETROPERITONEUM;  Surgeon: Sebas Reed MD;  [facility];  Service: General;  Laterality: Right;  ILIAC    URETEROSCOPIC REMOVAL OF URETERIC CALCULUS Right 10/27/2022    Procedure: REMOVAL, CALCULUS, URETER, URETEROSCOPIC;  Surgeon: Chirag Russ MD;  Location: [facility] OR [facility] FLR;  Service: Urology;  Laterality: Right;    URETEROSCOPY Right 10/27/2022    Procedure: URETEROSCOPY-ANTEGRADE;  Surgeon: Chirag Russ MD;  Location: [facility] OR [facility] FLR;  Service: Urology;  Laterality: Right;       Review of patient's allergies indicates:   Allergen Reactions    Bee sting [allergen ext-venom-honey bee]      Rash      Grass pollen-bermuda,  standard      rash       No current facility-administered medications on file prior to encounter.     Current Outpatient Medications on File Prior to Encounter   Medication Sig    acetaminophen (TYLENOL) 325 MG tablet Take 2 tablets (650 mg total) by mouth every 6 (six) hours as needed for Pain.    gabapentin (NEURONTIN) 100 MG capsule Take 2 capsules (200 mg total) by mouth every evening.    ketorolac (TORADOL) 10 mg tablet Take 1 tablet (10 mg total) by mouth every 6 (six) hours as needed for Pain.    melatonin (MELATIN) 3 mg tablet Take 2 tablets (6 mg total) by mouth nightly as needed for Insomnia.    mirtazapine (REMERON SOL-TAB) 15 MG disintegrating tablet Dissolve 1 tablet (15 mg total) by mouth nightly.    sodium bicarbonate 650 MG tablet Take 2 tablets (1,300 mg total) by mouth 2 (two) times daily.    sucralfate (CARAFATE) 1 gram tablet Take 1 tablet (1 g total) by mouth 4 (four) times daily before meals and nightly.    traMADoL (ULTRAM) 50 mg tablet Take 1 tablet (50 mg total) by mouth every 6 (six) hours.     Family History       Problem Relation (Age of Onset)    Breast cancer Maternal Aunt    Cancer Father, Mother    Cervical cancer Mother    Colon cancer Father    Drug abuse Daughter, Daughter    Esophageal cancer Father    Heart disease Sister, Maternal Grandmother    Suicide Daughter          Tobacco Use    Smoking status: Never    Smokeless tobacco: Never   Substance and Sexual Activity    Alcohol use: No     Alcohol/week: 0.0 standard drinks    Drug use: No    Sexual activity: Yes     Partners: Male     Birth control/protection: None     Comment:  19 years since 1999     Review of Systems   Constitutional:  Positive for chills (occasional). Negative for fever.   HENT:  Negative for congestion, sore throat and trouble swallowing.    Eyes:  Negative for visual disturbance.   Respiratory:  Negative for cough, shortness of breath and wheezing.    Cardiovascular:  Negative for chest pain,  palpitations and leg swelling.   Gastrointestinal:  Negative for abdominal pain, blood in stool, constipation, diarrhea, nausea and vomiting.   Genitourinary:  Positive for flank pain (L-sided from nephrostomy site).   Musculoskeletal:  Negative for arthralgias and myalgias.   Skin:  Negative for rash.   Neurological:  Negative for dizziness, light-headedness, numbness and headaches.   Psychiatric/Behavioral:  Negative for agitation and confusion.    Objective:     Vital Signs (Most Recent):  Temp: 96.7 °F (35.9 °C) (02/15/23 0749)  Pulse: 80 (02/15/23 0749)  Resp: 16 (02/15/23 0749)  BP: (!) 107/56 (02/15/23 0749)  SpO2: 99 % (02/15/23 0749)   Vital Signs (24h Range):  Temp:  [96.7 °F (35.9 °C)-101.8 °F (38.8 °C)] 96.7 °F (35.9 °C)  Pulse:  [] 80  Resp:  [12-20] 16  SpO2:  [96 %-100 %] 99 %  BP: (102-129)/(55-80) 107/56     Weight: 85.7 kg (189 lb)  Body mass index is 27.91 kg/m².    Physical Exam  Constitutional:       General: She is not in acute distress.     Appearance: She is well-developed. She is not ill-appearing.   HENT:      Head: Normocephalic and atraumatic.      Mouth/Throat:      Pharynx: No oropharyngeal exudate.   Eyes:      Conjunctiva/sclera: Conjunctivae normal.      Pupils: Pupils are equal, round, and reactive to light.   Cardiovascular:      Rate and Rhythm: Normal rate and regular rhythm.      Heart sounds: Normal heart sounds. No murmur heard.  Pulmonary:      Effort: Pulmonary effort is normal. No respiratory distress.      Breath sounds: Normal breath sounds. No wheezing or rales.   Abdominal:      General: Bowel sounds are normal. There is no distension.      Palpations: Abdomen is soft.      Tenderness: There is no abdominal tenderness. There is no guarding.      Comments: RLQ ileostomy in place  LLQ urostomy in place   Genitourinary:     Comments: R nephrostomy in place with drainage of clear yellow fluid  L nephrostomy site open with scant drainage of yellow fluid, no nephrostomy  tube in place  No purulence/bleeding from either site    Bilateral flank PCN insertion sites with scant drainage of clear yellow fluid; no purulent or bleeding noted   Musculoskeletal:         General: No tenderness.      Cervical back: Normal range of motion and neck supple.   Skin:     General: Skin is warm and dry.      Findings: No rash.   Neurological:      General: No focal deficit present.      Mental Status: She is alert and oriented to person, place, and time. Mental status is at baseline.      Cranial Nerves: No cranial nerve deficit.      Motor: No abnormal muscle tone.   Psychiatric:         Behavior: Behavior normal.       Significant Labs: All pertinent labs within the past 24 hours have been reviewed.  CBC:   Recent Labs   Lab 02/13/23  1608 02/13/23  1617 02/14/23  0412 02/15/23  0416   WBC 9.44  --  11.17 11.61   HGB 13.9  --  14.0 11.6*   HCT 47.3 46 48.7* 38.9     --  196 245       CMP:   Recent Labs   Lab 02/14/23  0412 02/15/23  0415    139   K 4.4 3.5    111*   CO2 17* 18*   GLU 99 102   BUN 25* 17   CREATININE 1.8* 1.5*   CALCIUM 9.9 8.6*   ANIONGAP 11 10         Significant Imaging: I have reviewed and interpreted all pertinent imaging results/findings within the past 24 hours.

## 2023-02-15 NOTE — PLAN OF CARE
[facility]  Initial Discharge Assessment       Primary Care Provider: Primary Doctor No    Admission Diagnosis: Nephrostomy tube displaced [T83.022A]  Chest pain [R07.9]    Admission Date: 2/13/2023  Expected Discharge Date: 2/16/2023    Discharge Barriers Identified: None    Payor: MEDICAID / Plan: [employer] [facility] ([employer]) / Product Type: Managed Medicaid /     Extended Emergency Contact Information  Primary Emergency Contact: Hammad Riley  Address: [address] [place] [address]  Home Phone: [phone]  Relation: Spouse  Secondary Emergency Contact: Federico Riley  Mobile Phone: [phone]  Relation: Daughter    Discharge Plan A: Home with family  Discharge Plan B: Home Health, Home with family      [id number] - [place], [place] - [address]  [address]  [place] [address]  Phone: [phone] Fax: [phone]    [id number] - [place] - [address]  [address]  [place] [address]  Phone: [phone] Fax: [phone]    [facility]  [address]  [facility] [address]  Phone: [phone] Fax: [phone]      Initial Assessment (most recent)       Adult Discharge Assessment - 02/15/23 1035          Discharge Assessment    Assessment Type Discharge Planning Assessment     Confirmed/corrected address, phone number and insurance Yes     Confirmed Demographics Correct on Facesheet     Source of Information patient     Communicated OK with patient/caregiver Yes     People in Home spouse;child(abigail), adult     Do you expect to return to your current living situation? Yes     Do you have help at home or someone to help you manage your care at home? Yes     Prior to hospitilization cognitive status: Alert/Oriented     Current cognitive status: Alert/Oriented     Home Layout Able to live on 1st floor     Do you currently have service(s) that help you  manage your care at home? No     Do you take prescription medications? Yes     Do you have prescription coverage? Yes     Do you have any problems affording any of your prescribed medications? No     Is the patient taking medications as prescribed? yes     How do you get to doctors appointments? family or friend will provide     Are you on dialysis? No     Do you take coumadin? No     Discharge Plan A Home with family     Discharge Plan B Home Health;Home with family     DME Needed Upon Discharge  none     Discharge Plan discussed with: Patient     Discharge Barriers Identified None                   Patient lives with her  and 22 y/o daughter in a first floor apartment with three entry steps. Patient does not feel she will need any post acute services upon hospital discharge. Her daughter and spouse are primary caregivers and will provide care to patient once she is discharged from the hospital. Patient spouse to provide transportation home/.

## 2023-02-15 NOTE — PLAN OF CARE
Pt is AAOX4,VSS. Pt is progressing towards plan of care goals. Pain management has been assessed. Pt does not report any pain, pain reassessed throughout shift. Colostomy bag changed twice today. Urostomy bag changed. Neph tubes bilaterally placed, output documented. Fall precautions in place, no report of falls. Safety measures are in place bed in lowest position, wheels locked, call light within reach.

## 2023-02-15 NOTE — ASSESSMENT & PLAN NOTE
This patient does have evidence of infective focus  My overall impression is sepsis.  Source: Urinary Tract  Antibiotics given-   Antibiotics (72h ago, onward)    Start     Stop Route Frequency Ordered    02/14/23 2100  linezolid tablet 600 mg         -- Oral Every 12 hours 02/14/23 1642 02/14/23 1745  cefTRIAXone (ROCEPHIN) 1 g in dextrose 5 % in water (D5W) 5 % 50 mL IVPB (MB+)         -- IV Every 12 hours (non-standard times) 02/14/23 1642        Latest lactate reviewed-  No results for input(s): LACTATE in the last 72 hours.  Organ dysfunction indicated by Acute kidney injury    Fluid challenge Ideal Body Weight- The patient's ideal body weight is Ideal body weight: 66.2 kg (145 lb 15.1 oz) which will be used to calculate fluid bolus of 30 ml/kg for treatment of septic shock.      Post- resuscitation assessment Yes Perfusion exam was performed within 6 hours of septic shock presentation after bolus shows Adequate tissue perfusion assessed by non-invasive monitoring       Will not start on vassopressures  Source control achieved by: IV Abx

## 2023-02-15 NOTE — PROGRESS NOTES
[facility] Medicine  Progress Note    Patient Name: Edita Riley  MRN: [id number]  Patient Class: OP- Observation   Admission Date: 2/13/2023  Length of Stay: 0 days  Attending Physician: Alessandra Juarez MD  Primary Care Provider: Primary Doctor No        Subjective:     Principal Problem:Sepsis        HPI:  Ms. 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    Patient was previously well until earlier today when she bent over and felt her L nephrostomy tube dislodge. She has some mild pain around the site but otherwise no new complaints. Denies fevers/chills, cough/congestion, nausea/vomiting, changes in output.    Tobacco use: denies  EtOH use: denies  Illicit drug use: denies  Functional status: completes ADLs independently  Baseline mental status: AAOx3  Living situation: lives with daughter and     ED Course:  Afebrile, hypotensive (90s/50s). Satting well on RA. Initial labs notable for improved renal function. IR consulted with plan for replacement of nephrostomy tube, tentatively on 2/14.      Overview/Hospital Course:  Ms. 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    Patient was previously well until earlier today when she bent over and felt her L nephrostomy tube dislodge. She has some mild pain around the site but otherwise no new complaints. Denies fevers/chills, cough/congestion, nausea/vomiting, changes in  output.  ED Course:  Afebrile, hypotensive (90s/50s). Satting well on RA. Initial labs notable for improved renal function. IR consulted with plan for replacement of nephrostomy tube, tentatively on 2/14.  IR is consulted.S/P Left nephrostomy tube replacement and exchange of right nephrostomy tube.receiving empiric IV Abx and started on IVF for mild increased Cr.  Patient started be septic after nephrostomy exchange with fever 101.8,tachycardia,check urine  and blood culture,started on Abx,will monitor.      Past Medical History:   Diagnosis Date    Abnormal mammogram 08/25/2020    Acute blood loss anemia     Acute deep vein thrombosis (DVT) of lower extremity 12/09/2020    Advance care planning 04/30/2021    Anemia due to chronic blood loss     Anemia due to chronic kidney disease     Anxiety     Cardiovascular event risk -- low 09/14/2015    ASCVD 10-year risk 1.9% (with optimal risk factors 1.3%) as of 9/14/2015     Cervical cancer 2014    Chronic back pain     Colostomy care     Deep vein thrombosis     Depression     Diarrhea due to malabsorption 11/14/2018    Diarrhea due to malabsorption 11/14/2018    Difficult intubation     Disorder of kidney and ureter     DVT of lower extremity, bilateral 11/04/2020    Fibromyalgia     Fungemia 09/27/2020    Generalized abdominal pain 08/25/2020    GERD (gastroesophageal reflux disease)     Hemifacial spasm 09/16/2015    Hiatal hernia 2014    History of cervical cancer 10/11/2018    Hx of psychiatric care     Cymbalta, trazodone    Hypertension     Hypomagnesemia 11/21/2018    Lactose intolerance     Metastatic squamous cell carcinoma to lymph node 10/11/2018    Nephrostomy tube displaced     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 drug 11/14/2018    Osteoarthritis of back     Peritonitis 09/22/2020    Pseudomonas urinary tract infection 04/21/2021    Psychiatric problem     Refusal of blood transfusions as patient is Taoist      Schatzki's ring 09/14/2015    Seen on outside EGD 05/2014, underwent esophageal dilatation. Bx were negative.     Seizures     Sleep stage dysfunction     Noted on PSG 06/2017; negative for obstructive sleep apnea     Stroke     Urinary tract infection associated with nephrostomy catheter 06/11/2020    Wound infection after surgery 09/24/2020       Past Surgical History:   Procedure Laterality Date    ANTEGRADE NEPHROSTOGRAPHY Bilateral 9/28/2020    Procedure: Nephrostogram - antegrade;  Surgeon: Leslie Balbuena MD;  Location: [facility] OR 1ST FLR;  Service: Urology;  Laterality: Bilateral;    ANTEGRADE NEPHROSTOGRAPHY Left 4/20/2021    Procedure: NEPHROSTOGRAM, ANTEGRADE;  Surgeon: Leslie Balbuena MD;  [facility];  Service: Urology;  Laterality: Left;    ANTEGRADE NEPHROSTOGRAPHY Right 10/27/2022    Procedure: NEPHROSTOGRAM, ANTEGRADE;  Surgeon: Chirag Russ MD;  [facility];  Service: Urology;  Laterality: Right;    BILATERAL OOPHORECTOMY  2015    CHOLECYSTECTOMY  11/09/2016    Done at [facility], path showed chronic cholecystitis and gallstones    cold knife conization  2014    COLECTOMY, RIGHT  9/17/2020    Procedure: COLECTOMY, RIGHT Extended;  Surgeon: Hammad Reynolds MD;  Location: [facility] OR 2ND FLR;  Service: Colon and Rectal;;    COLONOSCOPY  2014    COLONOSCOPY N/A 10/24/2016    at [facility], Dr Gutiérrez    COLONOSCOPY N/A 5/18/2018    Procedure: COLONOSCOPY;  Surgeon: Arden Gutiérrez MD;  Location: [facility] (4TH FLR);  Service: Endoscopy;  Laterality: N/A;    COLONOSCOPY N/A 7/28/2020    Procedure: COLONOSCOPY;  Surgeon: Hammad Reynolds MD;  Location: [facility] ENDO (4TH FLR);  Service: Colon and Rectal;  Laterality: N/A;  [facility]    CYSTOSCOPY WITH URETEROSCOPY, RETROGRADE PYELOGRAPHY, AND INSERTION OF STENT Bilateral 3/21/2020    Procedure: CYSTOSCOPY, WITH RETROGRADE PYELOGRAM,;  Surgeon: Leslie Balbuena MD;  Location: [facility] OR 1ST FLR;  Service:  Urology;  Laterality: Bilateral;    DILATION OF NEPHROSTOMY TRACT Right 10/27/2022    Procedure: DILATION, NEPHROSTOMY TRACT;  Surgeon: Chirag Russ MD;  Location: [facility] OR 1ST FLR;  Service: Urology;  Laterality: Right;    ESOPHAGOGASTRODUODENOSCOPY  2014    ESOPHAGOGASTRODUODENOSCOPY  11/18/2020    ESOPHAGOGASTRODUODENOSCOPY N/A 11/18/2020    Procedure: ESOPHAGOGASTRODUODENOSCOPY (EGD);  Surgeon: Zenon Spencer MD;  [facility] ENDO;  Service: Endoscopy;  Laterality: N/A;    ESOPHAGOGASTRODUODENOSCOPY N/A 12/11/2020    Procedure: EGD (ESOPHAGOGASTRODUODENOSCOPY);  Surgeon: Juancho Muse MD;  Location: [facility] (2ND FLR);  Service: Endoscopy;  Laterality: N/A;    HYSTERECTOMY  2014    [facility] for cervical cancer    ILEOSTOMY  9/17/2020    Procedure: CREATION, ILEOSTOMY;  Surgeon: Hammad Reynolds MD;  Location: [facility] OR 2ND FLR;  Service: Colon and Rectal;;    LOOPOGRAM N/A 4/20/2021    Procedure: LOOPOGRAM;  Surgeon: Leslie Balbuena MD;  Location: [facility] OR 1ST FLR;  Service: Urology;  Laterality: N/A;    MOBILIZATION OF SPLENIC FLEXURE N/A 9/11/2020    Procedure: MOBILIZATION, COLONIC;  Surgeon: Hammad Reynolds MD;  Location: [facility] OR 2ND FLR;  Service: Colon and Rectal;  Laterality: N/A;    NEPHROSTOGRAPHY Bilateral 4/17/2021    Procedure: Nephrostogram;  Surgeon: Celeste Surgeon;  Location: [facility];  Service: Anesthesiology;  Laterality: Bilateral;    OOPHORECTOMY      PYELOSCOPY Right 10/27/2022    Procedure: PYELOSCOPY;  Surgeon: Chirag Russ MD;  Location: [facility] OR 1ST FLR;  Service: Urology;  Laterality: Right;    REPLACEMENT OF NEPHROSTOMY TUBE Right 10/27/2022    Procedure: REPLACEMENT, NEPHROSTOMY TUBE;  Surgeon: Chirag Russ MD;  Location: [facility] OR 1ST FLR;  Service: Urology;  Laterality: Right;    RETROPERITONEAL LYMPHADENECTOMY Right 9/17/2018    Procedure: LYMPHADENECTOMY, RETROPERITONEUM;  Surgeon: Sebas Reed MD;  Location: [facility] OR [address] [place] [address];  Service:  General;  Laterality: Right;  ILIAC    URETEROSCOPIC REMOVAL OF URETERIC CALCULUS Right 10/27/2022    Procedure: REMOVAL, CALCULUS, URETER, URETEROSCOPIC;  Surgeon: Chirag Russ MD;  Location: [facility] OR [address] [place] [address];  Service: Urology;  Laterality: Right;    URETEROSCOPY Right 10/27/2022    Procedure: URETEROSCOPY-ANTEGRADE;  Surgeon: Chirag Russ MD;  Location: [facility] OR [address] [place] [address];  Service: Urology;  Laterality: Right;       Review of patient's allergies indicates:   Allergen Reactions    Bee sting [allergen ext-venom-honey bee]      Rash      Grass pollen-bermuda, standard      rash       No current facility-administered medications on file prior to encounter.     Current Outpatient Medications on File Prior to Encounter   Medication Sig    acetaminophen (TYLENOL) 325 MG tablet Take 2 tablets (650 mg total) by mouth every 6 (six) hours as needed for Pain.    gabapentin (NEURONTIN) 100 MG capsule Take 2 capsules (200 mg total) by mouth every evening.    ketorolac (TORADOL) 10 mg tablet Take 1 tablet (10 mg total) by mouth every 6 (six) hours as needed for Pain.    melatonin (MELATIN) 3 mg tablet Take 2 tablets (6 mg total) by mouth nightly as needed for Insomnia.    mirtazapine (REMERON SOL-TAB) 15 MG disintegrating tablet Dissolve 1 tablet (15 mg total) by mouth nightly.    sodium bicarbonate 650 MG tablet Take 2 tablets (1,300 mg total) by mouth 2 (two) times daily.    sucralfate (CARAFATE) 1 gram tablet Take 1 tablet (1 g total) by mouth 4 (four) times daily before meals and nightly.    traMADoL (ULTRAM) 50 mg tablet Take 1 tablet (50 mg total) by mouth every 6 (six) hours.     Family History       Problem Relation (Age of Onset)    Breast cancer Maternal Aunt    Cancer Father, Mother    Cervical cancer Mother    Colon cancer Father    Drug abuse Daughter, Daughter    Esophageal cancer Father    Heart disease Sister, Maternal Grandmother    Suicide Daughter          Tobacco Use    Smoking status:  Never    Smokeless tobacco: Never   Substance and Sexual Activity    Alcohol use: No     Alcohol/week: 0.0 standard drinks    Drug use: No    Sexual activity: Yes     Partners: Male     Birth control/protection: None     Comment:  19 years since 1999     Review of Systems   Constitutional:  Positive for chills (occasional). Negative for fever.   HENT:  Negative for congestion, sore throat and trouble swallowing.    Eyes:  Negative for visual disturbance.   Respiratory:  Negative for cough, shortness of breath and wheezing.    Cardiovascular:  Negative for chest pain, palpitations and leg swelling.   Gastrointestinal:  Negative for abdominal pain, blood in stool, constipation, diarrhea, nausea and vomiting.   Genitourinary:  Positive for flank pain (L-sided from nephrostomy site).   Musculoskeletal:  Negative for arthralgias and myalgias.   Skin:  Negative for rash.   Neurological:  Negative for dizziness, light-headedness, numbness and headaches.   Psychiatric/Behavioral:  Negative for agitation and confusion.    Objective:     Vital Signs (Most Recent):  Temp: 96.7 °F (35.9 °C) (02/15/23 0749)  Pulse: 80 (02/15/23 0749)  Resp: 16 (02/15/23 0749)  BP: (!) 107/56 (02/15/23 0749)  SpO2: 99 % (02/15/23 0749)   Vital Signs (24h Range):  Temp:  [96.7 °F (35.9 °C)-101.8 °F (38.8 °C)] 96.7 °F (35.9 °C)  Pulse:  [] 80  Resp:  [12-20] 16  SpO2:  [96 %-100 %] 99 %  BP: (102-129)/(55-80) 107/56     Weight: 85.7 kg (189 lb)  Body mass index is 27.91 kg/m².    Physical Exam  Constitutional:       General: She is not in acute distress.     Appearance: She is well-developed. She is not ill-appearing.   HENT:      Head: Normocephalic and atraumatic.      Mouth/Throat:      Pharynx: No oropharyngeal exudate.   Eyes:      Conjunctiva/sclera: Conjunctivae normal.      Pupils: Pupils are equal, round, and reactive to light.   Cardiovascular:      Rate and Rhythm: Normal rate and regular rhythm.      Heart sounds:  Normal heart sounds. No murmur heard.  Pulmonary:      Effort: Pulmonary effort is normal. No respiratory distress.      Breath sounds: Normal breath sounds. No wheezing or rales.   Abdominal:      General: Bowel sounds are normal. There is no distension.      Palpations: Abdomen is soft.      Tenderness: There is no abdominal tenderness. There is no guarding.      Comments: RLQ ileostomy in place  LLQ urostomy in place   Genitourinary:     Comments: R nephrostomy in place with drainage of clear yellow fluid  L nephrostomy site open with scant drainage of yellow fluid, no nephrostomy tube in place  No purulence/bleeding from either site    Bilateral flank PCN insertion sites with scant drainage of clear yellow fluid; no purulent or bleeding noted   Musculoskeletal:         General: No tenderness.      Cervical back: Normal range of motion and neck supple.   Skin:     General: Skin is warm and dry.      Findings: No rash.   Neurological:      General: No focal deficit present.      Mental Status: She is alert and oriented to person, place, and time. Mental status is at baseline.      Cranial Nerves: No cranial nerve deficit.      Motor: No abnormal muscle tone.   Psychiatric:         Behavior: Behavior normal.       Significant Labs: All pertinent labs within the past 24 hours have been reviewed.  CBC:   Recent Labs   Lab 02/13/23  1608 02/13/23  1617 02/14/23  0412 02/15/23  0416   WBC 9.44  --  11.17 11.61   HGB 13.9  --  14.0 11.6*   HCT 47.3 46 48.7* 38.9     --  196 245       CMP:   Recent Labs   Lab 02/14/23  0412 02/15/23  0415    139   K 4.4 3.5    111*   CO2 17* 18*   GLU 99 102   BUN 25* 17   CREATININE 1.8* 1.5*   CALCIUM 9.9 8.6*   ANIONGAP 11 10         Significant Imaging: I have reviewed and interpreted all pertinent imaging results/findings within the past 24 hours.      Assessment/Plan:      * Sepsis  This patient does have evidence of infective focus  My overall impression is  sepsis.  Source: Urinary Tract  Antibiotics given-   Antibiotics (72h ago, onward)    Start     Stop Route Frequency Ordered    02/14/23 2100  linezolid tablet 600 mg         -- Oral Every 12 hours 02/14/23 1642 02/14/23 1745  cefTRIAXone (ROCEPHIN) 1 g in dextrose 5 % in water (D5W) 5 % 50 mL IVPB (MB+)         -- IV Every 12 hours (non-standard times) 02/14/23 1642        Latest lactate reviewed-  No results for input(s): LACTATE in the last 72 hours.  Organ dysfunction indicated by Acute kidney injury    Fluid challenge Ideal Body Weight- The patient's ideal body weight is Ideal body weight: 66.2 kg (145 lb 15.1 oz) which will be used to calculate fluid bolus of 30 ml/kg for treatment of septic shock.      Post- resuscitation assessment Yes Perfusion exam was performed within 6 hours of septic shock presentation after bolus shows Adequate tissue perfusion assessed by non-invasive monitoring       Will not start on vassopressures  Source control achieved by: IV Abx    Acute renal failure superimposed on stage 3 chronic kidney disease  Stared on IVF,will monitor.      CKD (chronic kidney disease), stage III  Will monitor.      Nephrostomy status       Presence of urostomy  H/o cervical cancer s/p HYST/chemo/XRT  Complicated by radiation cystitis s/p colon conduit  Developed bowel perforation from colon conduit anastamosis s/p hemicolectomy + ileostomy  S/p urostomy + BL nephrostomy tubes    Ileostomy in place       Bilateral ureteral obstruction    IR is consulted.S/P Left nephrostomy tube replacement and exchange of right nephrostomy tube.receiving empiric IV Abx and started on IVF for mild increased Cr.    Radiation cystitis       Displacement of nephrostomy tube  59 yo F with BL ureteral obstruction 2/2 radiation cystitis s/p urostomy + BL nephrostomy tube placement admitted with L nephrostomy tube dislodgement. Afebrile, hypotensive (90s/50s). Satting well on RA. Initial labs notable for improved renal  function. IR consulted with plan for replacement of nephrostomy tube, tentatively on 2/14.    IR is consulted.S/P Left nephrostomy tube replacement and exchange of right nephrostomy tube.receiving empiric IV Abx and started on IVF for mild increased Cr.    Cervical cancer          VTE Risk Mitigation (From admission, onward)         Ordered     Reason for No Pharmacological VTE Prophylaxis  Once        Question:  Reasons:  Answer:  Physician Provided (leave comment)  Comment:  procedure    02/13/23 2315     IP VTE HIGH RISK PATIENT  Once         02/13/23 2315     Place sequential compression device  Until discontinued         02/13/23 2315     Place sequential compression device  Until discontinued         02/13/23 2236                Discharge Planning   OK: 2/16/2023     Code Status: Full Code   Is the patient medically ready for discharge?:     Reason for patient still in hospital (select all that apply): Patient trending condition                     Alessandra Juarez MD  Department of Hospital Medicine   [facility] - [facility]

## 2023-02-16 PROBLEM — N39.0 URINARY TRACT INFECTION WITHOUT HEMATURIA: Status: ACTIVE | Noted: 2023-02-16

## 2023-02-16 LAB
ANION GAP SERPL CALC-SCNC: 7 MMOL/L (ref 8–16)
BASOPHILS # BLD AUTO: 0.03 K/UL (ref 0–0.2)
BASOPHILS NFR BLD: 0.3 % (ref 0–1.9)
BUN SERPL-MCNC: 13 MG/DL (ref 6–20)
CALCIUM SERPL-MCNC: 8.6 MG/DL (ref 8.7–10.5)
CHLORIDE SERPL-SCNC: 109 MMOL/L (ref 95–110)
CO2 SERPL-SCNC: 21 MMOL/L (ref 23–29)
CREAT SERPL-MCNC: 1.4 MG/DL (ref 0.5–1.4)
DIFFERENTIAL METHOD: ABNORMAL
EOSINOPHIL # BLD AUTO: 0.2 K/UL (ref 0–0.5)
EOSINOPHIL NFR BLD: 2.4 % (ref 0–8)
ERYTHROCYTE [DISTWIDTH] IN BLOOD BY AUTOMATED COUNT: 14.5 % (ref 11.5–14.5)
ERYTHROCYTE [DISTWIDTH] IN BLOOD BY AUTOMATED COUNT: 14.7 % (ref 11.5–14.5)
EST. GFR  (NO RACE VARIABLE): 43.1 ML/MIN/1.73 M^2
GLUCOSE SERPL-MCNC: 82 MG/DL (ref 70–110)
HCT VFR BLD AUTO: 35.4 % (ref 37–48.5)
HCT VFR BLD AUTO: 36.3 % (ref 37–48.5)
HGB BLD-MCNC: 10.6 G/DL (ref 12–16)
HGB BLD-MCNC: 10.7 G/DL (ref 12–16)
IMM GRANULOCYTES # BLD AUTO: 0.02 K/UL (ref 0–0.04)
IMM GRANULOCYTES NFR BLD AUTO: 0.2 % (ref 0–0.5)
LYMPHOCYTES # BLD AUTO: 1.6 K/UL (ref 1–4.8)
LYMPHOCYTES NFR BLD: 17.8 % (ref 18–48)
MAGNESIUM SERPL-MCNC: 1.5 MG/DL (ref 1.6–2.6)
MCH RBC QN AUTO: 27 PG (ref 27–31)
MCH RBC QN AUTO: 27 PG (ref 27–31)
MCHC RBC AUTO-ENTMCNC: 29.5 G/DL (ref 32–36)
MCHC RBC AUTO-ENTMCNC: 29.9 G/DL (ref 32–36)
MCV RBC AUTO: 90 FL (ref 82–98)
MCV RBC AUTO: 91 FL (ref 82–98)
MONOCYTES # BLD AUTO: 1.5 K/UL (ref 0.3–1)
MONOCYTES NFR BLD: 16.2 % (ref 4–15)
NEUTROPHILS # BLD AUTO: 5.8 K/UL (ref 1.8–7.7)
NEUTROPHILS NFR BLD: 63.1 % (ref 38–73)
NRBC BLD-RTO: 0 /100 WBC
PHOSPHATE SERPL-MCNC: 2.5 MG/DL (ref 2.7–4.5)
PLATELET # BLD AUTO: 218 K/UL (ref 150–450)
PLATELET # BLD AUTO: 226 K/UL (ref 150–450)
PMV BLD AUTO: 10 FL (ref 9.2–12.9)
PMV BLD AUTO: 10.4 FL (ref 9.2–12.9)
POTASSIUM SERPL-SCNC: 3.6 MMOL/L (ref 3.5–5.1)
RBC # BLD AUTO: 3.93 M/UL (ref 4–5.4)
RBC # BLD AUTO: 3.97 M/UL (ref 4–5.4)
SODIUM SERPL-SCNC: 137 MMOL/L (ref 136–145)
WBC # BLD AUTO: 8.76 K/UL (ref 3.9–12.7)
WBC # BLD AUTO: 9.17 K/UL (ref 3.9–12.7)

## 2023-02-16 PROCEDURE — 99232 PR SUBSEQUENT HOSPITAL CARE,LEVL II: ICD-10-PCS | Mod: ,,, | Performed by: HOSPITALIST

## 2023-02-16 PROCEDURE — 80048 BASIC METABOLIC PNL TOTAL CA: CPT | Performed by: STUDENT IN AN ORGANIZED HEALTH CARE EDUCATION/TRAINING PROGRAM

## 2023-02-16 PROCEDURE — 80048 BASIC METABOLIC PNL TOTAL CA: CPT | Mod: 91 | Performed by: HOSPITALIST

## 2023-02-16 PROCEDURE — 85025 COMPLETE CBC W/AUTO DIFF WBC: CPT | Mod: 91 | Performed by: HOSPITALIST

## 2023-02-16 PROCEDURE — 97535 SELF CARE MNGMENT TRAINING: CPT

## 2023-02-16 PROCEDURE — 36415 COLL VENOUS BLD VENIPUNCTURE: CPT | Performed by: HOSPITALIST

## 2023-02-16 PROCEDURE — 25000003 PHARM REV CODE 250: Performed by: STUDENT IN AN ORGANIZED HEALTH CARE EDUCATION/TRAINING PROGRAM

## 2023-02-16 PROCEDURE — 85027 COMPLETE CBC AUTOMATED: CPT | Performed by: STUDENT IN AN ORGANIZED HEALTH CARE EDUCATION/TRAINING PROGRAM

## 2023-02-16 PROCEDURE — 36415 COLL VENOUS BLD VENIPUNCTURE: CPT | Performed by: STUDENT IN AN ORGANIZED HEALTH CARE EDUCATION/TRAINING PROGRAM

## 2023-02-16 PROCEDURE — 84100 ASSAY OF PHOSPHORUS: CPT | Performed by: STUDENT IN AN ORGANIZED HEALTH CARE EDUCATION/TRAINING PROGRAM

## 2023-02-16 PROCEDURE — 97165 OT EVAL LOW COMPLEX 30 MIN: CPT

## 2023-02-16 PROCEDURE — 97116 GAIT TRAINING THERAPY: CPT

## 2023-02-16 PROCEDURE — 63600175 PHARM REV CODE 636 W HCPCS: Performed by: HOSPITALIST

## 2023-02-16 PROCEDURE — 97161 PT EVAL LOW COMPLEX 20 MIN: CPT

## 2023-02-16 PROCEDURE — 25000003 PHARM REV CODE 250: Performed by: HOSPITALIST

## 2023-02-16 PROCEDURE — 99232 SBSQ HOSP IP/OBS MODERATE 35: CPT | Mod: ,,, | Performed by: HOSPITALIST

## 2023-02-16 PROCEDURE — 11000001 HC ACUTE MED/SURG PRIVATE ROOM

## 2023-02-16 PROCEDURE — 83735 ASSAY OF MAGNESIUM: CPT | Performed by: STUDENT IN AN ORGANIZED HEALTH CARE EDUCATION/TRAINING PROGRAM

## 2023-02-16 RX ORDER — SODIUM,POTASSIUM PHOSPHATES 280-250MG
1 POWDER IN PACKET (EA) ORAL
Status: DISCONTINUED | OUTPATIENT
Start: 2023-02-16 | End: 2023-02-18 | Stop reason: HOSPADM

## 2023-02-16 RX ORDER — LANOLIN ALCOHOL/MO/W.PET/CERES
400 CREAM (GRAM) TOPICAL 2 TIMES DAILY
Status: DISCONTINUED | OUTPATIENT
Start: 2023-02-16 | End: 2023-02-18 | Stop reason: HOSPADM

## 2023-02-16 RX ADMIN — POTASSIUM & SODIUM PHOSPHATES POWDER PACK 280-160-250 MG 1 PACKET: 280-160-250 PACK at 09:02

## 2023-02-16 RX ADMIN — LINEZOLID 600 MG: 600 TABLET, FILM COATED ORAL at 09:02

## 2023-02-16 RX ADMIN — POTASSIUM & SODIUM PHOSPHATES POWDER PACK 280-160-250 MG 1 PACKET: 280-160-250 PACK at 12:02

## 2023-02-16 RX ADMIN — SODIUM BICARBONATE 1300 MG: 650 TABLET ORAL at 09:02

## 2023-02-16 RX ADMIN — SODIUM CHLORIDE: 4.5 INJECTION, SOLUTION INTRAVENOUS at 12:02

## 2023-02-16 RX ADMIN — CEFTRIAXONE 1 G: 1 INJECTION, POWDER, FOR SOLUTION INTRAMUSCULAR; INTRAVENOUS at 05:02

## 2023-02-16 RX ADMIN — SODIUM CHLORIDE: 4.5 INJECTION, SOLUTION INTRAVENOUS at 09:02

## 2023-02-16 RX ADMIN — GABAPENTIN 200 MG: 100 CAPSULE ORAL at 09:02

## 2023-02-16 RX ADMIN — POTASSIUM & SODIUM PHOSPHATES POWDER PACK 280-160-250 MG 1 PACKET: 280-160-250 PACK at 04:02

## 2023-02-16 RX ADMIN — MAGNESIUM OXIDE TAB 400 MG (241.3 MG ELEMENTAL MG) 400 MG: 400 (241.3 MG) TAB at 09:02

## 2023-02-16 RX ADMIN — SODIUM CHLORIDE: 4.5 INJECTION, SOLUTION INTRAVENOUS at 06:02

## 2023-02-16 NOTE — ASSESSMENT & PLAN NOTE
IR is consulted.S/P Left nephrostomy tube replacement and exchange of right nephrostomy tube.receiving empiric IV Abx and started on IVF for mild increased Cr.  S/P exchanged nephrotomy tube.

## 2023-02-16 NOTE — PLAN OF CARE
Edita Riley is a 58 y.o. female admitted [place] [facility] [place] [facility] for Sepsis. Edita Riley tolerated evaluation well today. She was resting in bed with no family present upon PT entry to room, agreeable to evaluation. Reports 9/10 generalized BLE pain but overall she is very happy, often laughing and joking with therapist. She indicates that she has been mostly sedentary in bed over last 3 days since admitted but looking forward to mobilizing. She is well-known to this therapist from prior admissions. Required some minor assistance to transition out of bed but otherwise stand-by assistance. Ambulates 300 ft in hallways on room air with rolling walker and stand-by assistance; gait is steady with no losses of balance, no fatigue or SOB noted. Comfortable reclined in bedside chair upon PT exit from room; obtained rolling walker from C&D and left in room for patient to utilize with nursing. Discussed PT role, POC, goals and recommendations (Home with family, no DME needs) with patient; verbalized understanding. Edita Riley would benefit from acute PT services to promote mobility during this admission and improve return to PLOF.    Problem: Physical Therapy  Goal: Physical Therapy Goal  Description: Goals to be met by: 3/2/23     Patient will increase functional independence with mobility by performin. Supine to sit with Stand-by Assistance - Not met  2. Sit to stand transfer with Supervision from bedside chair with or without RW - Not met  3. Gait  x 500 feet with Supervision using Rolling Walker - Not met  4. Ascend/descend 3 MANAV with left Handrails Stand-by Assistance using No Assistive Device - Not met  Outcome: Ongoing, Progressing    Tim Valenzuela, PT  2023

## 2023-02-16 NOTE — SUBJECTIVE & OBJECTIVE
Past Medical History:   Diagnosis Date    Abnormal mammogram 08/25/2020    Acute blood loss anemia     Acute deep vein thrombosis (DVT) of lower extremity 12/09/2020    Advance care planning 04/30/2021    Anemia due to chronic blood loss     Anemia due to chronic kidney disease     Anxiety     Cardiovascular event risk -- low 09/14/2015    ASCVD 10-year risk 1.9% (with optimal risk factors 1.3%) as of 9/14/2015     Cervical cancer 2014    Chronic back pain     Colostomy care     Deep vein thrombosis     Depression     Diarrhea due to malabsorption 11/14/2018    Diarrhea due to malabsorption 11/14/2018    Difficult intubation     Disorder of kidney and ureter     DVT of lower extremity, bilateral 11/04/2020    Fibromyalgia     Fungemia 09/27/2020    Generalized abdominal pain 08/25/2020    GERD (gastroesophageal reflux disease)     Hemifacial spasm 09/16/2015    Hiatal hernia 2014    History of cervical cancer 10/11/2018    Hx of psychiatric care     Cymbalta, trazodone    Hypertension     Hypomagnesemia 11/21/2018    Lactose intolerance     Metastatic squamous cell carcinoma to lymph node 10/11/2018    Nephrostomy tube displaced     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 drug 11/14/2018    Osteoarthritis of back     Peritonitis 09/22/2020    Pseudomonas urinary tract infection 04/21/2021    Psychiatric problem     Refusal of blood transfusions as patient is Gnosticism     Schatzki's ring 09/14/2015    Seen on outside EGD 05/2014, underwent esophageal dilatation. Bx were negative.     Seizures     Sleep stage dysfunction     Noted on PSG 06/2017; negative for obstructive sleep apnea     Stroke     Urinary tract infection associated with nephrostomy catheter 06/11/2020    Wound infection after surgery 09/24/2020       Past Surgical History:   Procedure Laterality Date    ANTEGRADE NEPHROSTOGRAPHY Bilateral 9/28/2020    Procedure: Nephrostogram - antegrade;  Surgeon: Leslie Balbuena MD;  Location: [facility] OR [address] [place] [address];   Service: Urology;  Laterality: Bilateral;    ANTEGRADE NEPHROSTOGRAPHY Left 4/20/2021    Procedure: NEPHROSTOGRAM, ANTEGRADE;  Surgeon: Leslie Balbuena MD;  Location: [facility] OR 1ST FLR;  Service: Urology;  Laterality: Left;    ANTEGRADE NEPHROSTOGRAPHY Right 10/27/2022    Procedure: NEPHROSTOGRAM, ANTEGRADE;  Surgeon: Chirag Russ MD;  Location: [facility] OR 1ST FLR;  Service: Urology;  Laterality: Right;    BILATERAL OOPHORECTOMY  2015    CHOLECYSTECTOMY  11/09/2016    Done at [facility], path showed chronic cholecystitis and gallstones    cold knife conization  2014    COLECTOMY, RIGHT  9/17/2020    Procedure: COLECTOMY, RIGHT Extended;  Surgeon: Hammad Reynolds MD;  Location: [facility] OR 2ND FLR;  Service: Colon and Rectal;;    COLONOSCOPY  2014    COLONOSCOPY N/A 10/24/2016    at [facility], Dr Gutiérrez    COLONOSCOPY N/A 5/18/2018    Procedure: COLONOSCOPY;  Surgeon: Arden Gutiérrez MD;  Location: [facility] (4TH FLR);  Service: Endoscopy;  Laterality: N/A;    COLONOSCOPY N/A 7/28/2020    Procedure: COLONOSCOPY;  Surgeon: Hammad Reynolds MD;  Location: [facility] (4TH FLR);  Service: Colon and Rectal;  Laterality: N/A;  covid test [place] 7/25    CYSTOSCOPY WITH URETEROSCOPY, RETROGRADE PYELOGRAPHY, AND INSERTION OF STENT Bilateral 3/21/2020    Procedure: CYSTOSCOPY, WITH RETROGRADE PYELOGRAM,;  Surgeon: Leslie Balbuena MD;  Location: [facility] OR 1ST FLR;  Service: Urology;  Laterality: Bilateral;    DILATION OF NEPHROSTOMY TRACT Right 10/27/2022    Procedure: DILATION, NEPHROSTOMY TRACT;  Surgeon: Chirag Russ MD;  Location: [facility] OR 1ST FLR;  Service: Urology;  Laterality: Right;    ESOPHAGOGASTRODUODENOSCOPY  2014    ESOPHAGOGASTRODUODENOSCOPY  11/18/2020    ESOPHAGOGASTRODUODENOSCOPY N/A 11/18/2020    Procedure: ESOPHAGOGASTRODUODENOSCOPY (EGD);  Surgeon: Zenon Spencer MD;  Location: [facility];  Service: Endoscopy;  Laterality: N/A;    ESOPHAGOGASTRODUODENOSCOPY N/A 12/11/2020    Procedure: EGD  (ESOPHAGOGASTRODUODENOSCOPY);  Surgeon: Juancho Muse MD;  Location: [facility] ENDO (2ND FLR);  Service: Endoscopy;  Laterality: N/A;    HYSTERECTOMY  2014    [facility] for cervical [facility]    ILEOSTOMY  9/17/2020    Procedure: CREATION, ILEOSTOMY;  Surgeon: Hammad Reynolds MD;  [facility] OR 2ND FLR;  Service: Colon and Rectal;;    LOOPOGRAM N/A 4/20/2021    Procedure: LOOPOGRAM;  Surgeon: Leslie Balbuena MD;  [facility]: [facility] OR 1ST FLR;  Service: Urology;  Laterality: N/A;    MOBILIZATION OF SPLENIC FLEXURE N/A 9/11/2020    Procedure: MOBILIZATION, COLONIC;  Surgeon: Hammad Reynolds MD;  Location: [facility] OR 2ND FLR;  Service: Colon and Rectal;  Laterality: N/A;    NEPHROSTOGRAPHY Bilateral 4/17/2021    Procedure: Nephrostogram;  Surgeon: Celeste Surgeon;  Location: [facility];  Service: Anesthesiology;  Laterality: Bilateral;    OOPHORECTOMY      PYELOSCOPY Right 10/27/2022    Procedure: PYELOSCOPY;  Surgeon: Chirag Russ MD;  [facility];  Service: Urology;  Laterality: Right;    REPLACEMENT OF NEPHROSTOMY TUBE Right 10/27/2022    Procedure: REPLACEMENT, NEPHROSTOMY TUBE;  Surgeon: Chirag Russ MD;  [facility];  Service: Urology;  Laterality: Right;    RETROPERITONEAL LYMPHADENECTOMY Right 9/17/2018    Procedure: LYMPHADENECTOMY, RETROPERITONEUM;  Surgeon: Sebas Reed MD;  [facility];  Service: General;  Laterality: Right;  ILIAC    URETEROSCOPIC REMOVAL OF URETERIC CALCULUS Right 10/27/2022    Procedure: REMOVAL, CALCULUS, URETER, URETEROSCOPIC;  Surgeon: Chirag Russ MD;  Location: [facility] OR [facility] FLR;  Service: Urology;  Laterality: Right;    URETEROSCOPY Right 10/27/2022    Procedure: URETEROSCOPY-ANTEGRADE;  Surgeon: Chirag Russ MD;  Location: [facility] OR [facility] FLR;  Service: Urology;  Laterality: Right;       Review of patient's allergies indicates:   Allergen Reactions    Bee sting [allergen ext-venom-honey bee]      Rash      Grass pollen-bermuda,  standard      rash       No current facility-administered medications on file prior to encounter.     Current Outpatient Medications on File Prior to Encounter   Medication Sig    acetaminophen (TYLENOL) 325 MG tablet Take 2 tablets (650 mg total) by mouth every 6 (six) hours as needed for Pain.    gabapentin (NEURONTIN) 100 MG capsule Take 2 capsules (200 mg total) by mouth every evening.    ketorolac (TORADOL) 10 mg tablet Take 1 tablet (10 mg total) by mouth every 6 (six) hours as needed for Pain.    melatonin (MELATIN) 3 mg tablet Take 2 tablets (6 mg total) by mouth nightly as needed for Insomnia.    mirtazapine (REMERON SOL-TAB) 15 MG disintegrating tablet Dissolve 1 tablet (15 mg total) by mouth nightly.    sodium bicarbonate 650 MG tablet Take 2 tablets (1,300 mg total) by mouth 2 (two) times daily.    sucralfate (CARAFATE) 1 gram tablet Take 1 tablet (1 g total) by mouth 4 (four) times daily before meals and nightly.    traMADoL (ULTRAM) 50 mg tablet Take 1 tablet (50 mg total) by mouth every 6 (six) hours.     Family History       Problem Relation (Age of Onset)    Breast cancer Maternal Aunt    Cancer Father, Mother    Cervical cancer Mother    Colon cancer Father    Drug abuse Daughter, Daughter    Esophageal cancer Father    Heart disease Sister, Maternal Grandmother    Suicide Daughter          Tobacco Use    Smoking status: Never    Smokeless tobacco: Never   Substance and Sexual Activity    Alcohol use: No     Alcohol/week: 0.0 standard drinks    Drug use: No    Sexual activity: Yes     Partners: Male     Birth control/protection: None     Comment:  19 years since 1999     Review of Systems   Constitutional:  Positive for chills (occasional). Negative for fever.   HENT:  Negative for congestion, sore throat and trouble swallowing.    Eyes:  Negative for visual disturbance.   Respiratory:  Negative for cough, shortness of breath and wheezing.    Cardiovascular:  Negative for chest pain,  palpitations and leg swelling.   Gastrointestinal:  Negative for abdominal pain, blood in stool, constipation, diarrhea, nausea and vomiting.   Genitourinary:  Positive for flank pain (L-sided from nephrostomy site).   Musculoskeletal:  Negative for arthralgias and myalgias.   Skin:  Negative for rash.   Neurological:  Negative for dizziness, light-headedness, numbness and headaches.   Psychiatric/Behavioral:  Negative for agitation and confusion.    Objective:     Vital Signs (Most Recent):  Temp: 97.4 °F (36.3 °C) (02/16/23 0738)  Pulse: 76 (02/16/23 0738)  Resp: 18 (02/16/23 0738)  BP: 119/63 (02/16/23 0738)  SpO2: 97 % (02/16/23 0738)   Vital Signs (24h Range):  Temp:  [97.4 °F (36.3 °C)-99.2 °F (37.3 °C)] 97.4 °F (36.3 °C)  Pulse:  [75-86] 76  Resp:  [16-18] 18  SpO2:  [95 %-99 %] 97 %  BP: ()/(54-68) 119/63     Weight: 85.7 kg (189 lb)  Body mass index is 27.91 kg/m².    Physical Exam  Constitutional:       General: She is not in acute distress.     Appearance: She is well-developed. She is not ill-appearing.   HENT:      Head: Normocephalic and atraumatic.      Mouth/Throat:      Pharynx: No oropharyngeal exudate.   Eyes:      Conjunctiva/sclera: Conjunctivae normal.      Pupils: Pupils are equal, round, and reactive to light.   Cardiovascular:      Rate and Rhythm: Normal rate and regular rhythm.      Heart sounds: Normal heart sounds. No murmur heard.  Pulmonary:      Effort: Pulmonary effort is normal. No respiratory distress.      Breath sounds: Normal breath sounds. No wheezing or rales.   Abdominal:      General: Bowel sounds are normal. There is no distension.      Palpations: Abdomen is soft.      Tenderness: There is no abdominal tenderness. There is no guarding.      Comments: RLQ ileostomy in place  LLQ urostomy in place   Genitourinary:     Comments: R nephrostomy in place with drainage of clear yellow fluid  L nephrostomy site open with scant drainage of yellow fluid, no nephrostomy tube in  place  No purulence/bleeding from either site    Bilateral flank PCN insertion sites with scant drainage of clear yellow fluid; no purulent or bleeding noted   Musculoskeletal:         General: No tenderness.      Cervical back: Normal range of motion and neck supple.   Skin:     General: Skin is warm and dry.      Findings: No rash.   Neurological:      General: No focal deficit present.      Mental Status: She is alert and oriented to person, place, and time. Mental status is at baseline.      Cranial Nerves: No cranial nerve deficit.      Motor: No abnormal muscle tone.   Psychiatric:         Behavior: Behavior normal.       Significant Labs: All pertinent labs within the past 24 hours have been reviewed.  CBC:   Recent Labs   Lab 02/15/23  0416 02/16/23  0005 02/16/23 0415   WBC 11.61 9.17 8.76   HGB 11.6* 10.6* 10.7*   HCT 38.9 35.4* 36.3*    218 226       CMP:   Recent Labs   Lab 02/15/23  0415 02/16/23  0415    137   K 3.5 3.6   * 109   CO2 18* 21*    82   BUN 17 13   CREATININE 1.5* 1.4   CALCIUM 8.6* 8.6*   ANIONGAP 10 7*         Significant Imaging: I have reviewed and interpreted all pertinent imaging results/findings within the past 24 hours.

## 2023-02-16 NOTE — PT/OT/SLP EVAL
Occupational Therapy   Evaluation & Treatment    Name: Edita Riley  MRN: [id number]  Admitting Diagnosis:  Sepsis    Length of Stay: 1 days    Recommendations:     Discharge Recommendations: other (see comments)  Discharge Equipment Recommendations:  none  Barriers to discharge:  None    Plan:     Patient to be seen 2 x/week to address the above listed problems via self-care/home management, therapeutic activities, therapeutic exercises, neuromuscular re-education  Plan of Care Expires: 03/18/23  Plan of Care Reviewed with: patient      Assessment:     Edita Riley is a 58 y.o. female with a medical diagnosis of Sepsis.  She presents with the following performance deficits affecting function: impaired endurance, impaired self care skills, impaired functional mobility, gait instability, impaired balance, decreased upper extremity function.      Pt with good tolerance of today's evaluation. Pt requiring SBA for bed mobility, SBA for functional mobility (gait) with RW, and Min A for ADLs (grooming) while standing at sink. Pt's strengths include good insight to deficits, strong family support, and strong desire to regain/maintain independence. Pt with good motivation and good tolerance for therapy. Pt would benefit from discharging to the home environment with further OT services to return to PLOF.    Rehab Prognosis: Good; patient would benefit from acute skilled OT services to address these deficits and reach maximum level of function.         Subjective     Communicated with: RN prior to session. Patient found UIC with nephrostomy, peripheral IV (ileostomy) and no family present upon OT entry to room.    Chief Complaint: nephrostomy bag displacement (Pt's nephrostomy bag on left side was dislodged this morning. )    Patient/Family Comments/goals: Pt agreeable to evaluation.    Pain/Comfort:  Pain Rating 1: 9/10  Location - Side 1: Bilateral  Location 1: leg  Pain Addressed 1: Reposition,  "Distraction  Pain Rating Post-Intervention 1: other (see comments) (no rating provided)    Patients cultural, spiritual, Lutheran conflicts given the current situation: no    Occupational Profile:  Living Environment: Pt lives with her daughter and  in a 1st floor apartment with 3 MANAV and a LHR. Pt's bathroom has a tub/shower combo.  Prior Level of Function: Patient reports being Mod I with mobility using RW and W/C PRN. She requires assistance with bathing and dressing, otherwise Ind/Mod I for ADLs.  Patient uses DME as follows: RW, W/C.   DME owned (not currently used): None.  Roles/Responsibilities:   Work: No.   Drive: No.   Managing Medicines/Managing Home: No.     Patient reports they will have assistance from family upon discharge.      Objective:     Patient found with: nephrostomy, peripheral IV (ileostomy)   General Precautions: Standard, fall   Orthopedic Precautions:N/A   Braces: N/A   Oxygen Device: Room air  Vitals: BP (!) 106/52 (BP Location: Left arm, Patient Position: Sitting)   Pulse 73   Temp 98.6 °F (37 °C) (Oral)   Resp 18   Ht 5' 9" (1.753 m)   Wt 85.7 kg (189 lb)   LMP 06/08/2014 (Approximate)   SpO2 (!) 94%   Breastfeeding No   BMI 27.91 kg/m²     Cognitive and Psychosocial Function:   AxOx Not formally tested, no signs of disorientation noted   Command-Following  Follows multi-step commands consistently   Communication Clear/fluent   Memory No deficits noted   Safety Awareness Intact   Affect Pleasant     Hearing: WFL    Vision: Wears reading glasses    Physical Exam:  Postural examination/scapula alignment:    -       Rounded shoulders   Left UE Right UE   UE Edema Absent Absent   UE ROM WFL WFL   UE Strength WFL WFL    Strength WFL WFL   Sensation No deficits noted No deficits noted   Fine Motor Coordination Impaired (manipulation of objects) Impaired (manipulation of objects)   Gross Motor Coordination Intact Intact     Occupational Performance:  Bed Mobility:  "   Patient completed sit to supine with SBA on right side of bed    Functional Mobility/Transfers:  Static Sitting EOB: Sup  Patient completed Sit <> Stand Transfer with SBA with RW  Static Standing Balance: SBA  Patient completed Chair > Bed Transfer using step transfer technique with RW with SBA  Functional Mobility: Pt engaging in functional mobility to simulate household/community distances utilizing RW and SBA in order to maximize functional activity tolerance and standing balance required for engagement in occupations of choice  LOB: Absent  SOB: Absent  Dizziness/Lightheadedness: Absent    Activities of Daily Living:  Grooming: Min A to perform oral hygiene while standing at sink  Assistance for cap management      AMPAC 6 Click ADL:  AMPA Total Score: 19    Treatment & Education:  Educated on role of OT, POC, and goals for this hospital stay  Emphasized importance of OOB ax and level of staff assistance required for transfers and functional mobility (SBA with RW)  Encouraged pt to alert OT of personal self-care goals and/or comfort-related concerns during future OT sessions  Pt denies any further questions, concerns, or requests at this time        Patient left HOB elevated with all lines intact and call button in reach    GOALS:   Multidisciplinary Problems       Occupational Therapy Goals          Problem: Occupational Therapy    Goal Priority Disciplines Outcome Interventions   Occupational Therapy Goal     OT, PT/OT Ongoing, Progressing    Description: Goals to be met by: 3/2/2023     Patient will increase functional independence with ADLs by performing:    UE Dressing with Set-up Assistance.  LE Dressing with Minimal Assistance.  Grooming while standing at sink with Set-up Assistance.  Step transfer with Supervision using AD PRN.                           History:     Past Medical History:   Diagnosis Date    Abnormal mammogram 08/25/2020    Acute blood loss anemia     Acute deep vein thrombosis (DVT)  of lower extremity 12/09/2020    Advance care planning 04/30/2021    Anemia due to chronic blood loss     Anemia due to chronic kidney disease     Anxiety     Cardiovascular event risk -- low 09/14/2015    ASCVD 10-year risk 1.9% (with optimal risk factors 1.3%) as of 9/14/2015     Cervical cancer 2014    Chronic back pain     Colostomy care     Deep vein thrombosis     Depression     Diarrhea due to malabsorption 11/14/2018    Diarrhea due to malabsorption 11/14/2018    Difficult intubation     Disorder of kidney and ureter     DVT of lower extremity, bilateral 11/04/2020    Fibromyalgia     Fungemia 09/27/2020    Generalized abdominal pain 08/25/2020    GERD (gastroesophageal reflux disease)     Hemifacial spasm 09/16/2015    Hiatal hernia 2014    History of cervical cancer 10/11/2018    Hx of psychiatric care     Cymbalta, trazodone    Hypertension     Hypomagnesemia 11/21/2018    Lactose intolerance     Metastatic squamous cell carcinoma to lymph node 10/11/2018    Nephrostomy tube displaced     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 drug 11/14/2018    Osteoarthritis of back     Peritonitis 09/22/2020    Pseudomonas urinary tract infection 04/21/2021    Psychiatric problem     Refusal of blood transfusions as patient is Druze     Schatzki's ring 09/14/2015    Seen on outside EGD 05/2014, underwent esophageal dilatation. Bx were negative.     Seizures     Sleep stage dysfunction     Noted on PSG 06/2017; negative for obstructive sleep apnea     Stroke     Urinary tract infection associated with nephrostomy catheter 06/11/2020    Wound infection after surgery 09/24/2020         Past Surgical History:   Procedure Laterality Date    ANTEGRADE NEPHROSTOGRAPHY Bilateral 9/28/2020    Procedure: Nephrostogram - antegrade;  Surgeon: Leslie Balbuena MD;  Location: [facility] OR [address] [place] [address];  Service: Urology;  Laterality: Bilateral;    ANTEGRADE NEPHROSTOGRAPHY Left 4/20/2021    Procedure: NEPHROSTOGRAM, ANTEGRADE;   Surgeon: Leslie Balbuena MD;  [facility];  Service: Urology;  Laterality: Left;    ANTEGRADE NEPHROSTOGRAPHY Right 10/27/2022    Procedure: NEPHROSTOGRAM, ANTEGRADE;  Surgeon: Chirag Russ MD;  [facility];  Service: Urology;  Laterality: Right;    BILATERAL OOPHORECTOMY  2015    CHOLECYSTECTOMY  11/09/2016    Done at [facility], path showed chronic cholecystitis and gallstones    cold knife conization  2014    COLECTOMY, RIGHT  9/17/2020    Procedure: COLECTOMY, RIGHT Extended;  Surgeon: Hammad Reynolds MD;  Location: [facility] OR 2ND FLR;  Service: Colon and Rectal;;    COLONOSCOPY  2014    COLONOSCOPY N/A 10/24/2016    at [facility], Dr Gutiérrez    COLONOSCOPY N/A 5/18/2018    Procedure: COLONOSCOPY;  Surgeon: Arden Gutiérrez MD;  Location: [facility] (4TH FLR);  Service: Endoscopy;  Laterality: N/A;    COLONOSCOPY N/A 7/28/2020    Procedure: COLONOSCOPY;  Surgeon: Hammad Reynolds MD;  Location: [facility] (4TH FLR);  Service: Colon and Rectal;  Laterality: N/A;  covid test [place] 7/25    CYSTOSCOPY WITH URETEROSCOPY, RETROGRADE PYELOGRAPHY, AND INSERTION OF STENT Bilateral 3/21/2020    Procedure: CYSTOSCOPY, WITH RETROGRADE PYELOGRAM,;  Surgeon: Leslie Balbuena MD;  [facility];  Service: Urology;  Laterality: Bilateral;    DILATION OF NEPHROSTOMY TRACT Right 10/27/2022    Procedure: DILATION, NEPHROSTOMY TRACT;  Surgeon: Chirag Russ MD;  [facility];  Service: Urology;  Laterality: Right;    ESOPHAGOGASTRODUODENOSCOPY  2014    ESOPHAGOGASTRODUODENOSCOPY  11/18/2020    ESOPHAGOGASTRODUODENOSCOPY N/A 11/18/2020    Procedure: ESOPHAGOGASTRODUODENOSCOPY (EGD);  Surgeon: Zenon Spencer MD;  Location: [facility];  Service: Endoscopy;  Laterality: N/A;    ESOPHAGOGASTRODUODENOSCOPY N/A 12/11/2020    Procedure: EGD (ESOPHAGOGASTRODUODENOSCOPY);  Surgeon: Juancho Muse MD;  Location: [facility] (2ND FLR);  Service: Endoscopy;  Laterality: N/A;     HYSTERECTOMY  2014    [facility] for cervical cancer    ILEOSTOMY  9/17/2020    Procedure: CREATION, ILEOSTOMY;  Surgeon: Hammad Reynolds MD;  [facility] OR 2ND FLR;  Service: Colon and Rectal;;    LOOPOGRAM N/A 4/20/2021    Procedure: LOOPOGRAM;  Surgeon: Leslie Balbuena MD;  Location[facility] OR 1ST FLR;  Service: Urology;  Laterality: N/A;    MOBILIZATION OF SPLENIC FLEXURE N/A 9/11/2020    Procedure: MOBILIZATION, COLONIC;  Surgeon: Hammad Reynolds MD;  Location: [facility] OR 2ND FLR;  Service: Colon and Rectal;  Laterality: N/A;    NEPHROSTOGRAPHY Bilateral 4/17/2021    Procedure: Nephrostogram;  Surgeon: Celeste Surgeon;  Location: [facility];  Service: Anesthesiology;  Laterality: Bilateral;    OOPHORECTOMY      PYELOSCOPY Right 10/27/2022    Procedure: PYELOSCOPY;  Surgeon: Chirag Russ MD;  [facility];  Service: Urology;  Laterality: Right;    REPLACEMENT OF NEPHROSTOMY TUBE Right 10/27/2022    Procedure: REPLACEMENT, NEPHROSTOMY TUBE;  Surgeon: Chirag Russ MD;  [facility];  Service: Urology;  Laterality: Right;    RETROPERITONEAL LYMPHADENECTOMY Right 9/17/2018    Procedure: LYMPHADENECTOMY, RETROPERITONEUM;  Surgeon: Sebas Reed MD;  Location: [facility] OR 2ND FLR;  Service: General;  Laterality: Right;  ILIAC    URETEROSCOPIC REMOVAL OF URETERIC CALCULUS Right 10/27/2022    Procedure: REMOVAL, CALCULUS, URETER, URETEROSCOPIC;  Surgeon: Chirag Russ MD;  [facility];  Service: Urology;  Laterality: Right;    URETEROSCOPY Right 10/27/2022    Procedure: URETEROSCOPY-ANTEGRADE;  Surgeon: Chirag Russ MD;  [facility];  Service: Urology;  Laterality: Right;         Time Tracking:     OT Date of Treatment: 02/16/23  OT Start Time: 1253  OT Stop Time: 1315  OT Total Time (min): 22 min  Additional staff present: N/A    Billable Minutes:  Evaluation 10  Self Care/Home Management 12      2/16/2023

## 2023-02-16 NOTE — PT/OT/SLP EVAL
Physical Therapy  Evaluation and Treatment    Edita Riley   [id number]    Time Tracking:     PT Received On: 02/16/23   PT Start Time: 0948   PT Stop Time: 1010   PT Total Time (min): 22 min    Billable Minutes: Evaluation 10 and Gait Training 12 minutes       Recommendations:     Discharge recommendations: Other (no PT needs identified upon D/C)     Equipment recommendations: None    Barriers to Discharge: None    Patient Information:     Recent Surgery: * No surgery found *      Diagnosis: Sepsis    Length of Stay: 1 days    General Precautions: Standard, fall  Orthopedic Precautions: None  Brace: None    Assessment:     Edita Riley is a 58 y.o. female admitted to [facility] on 2/13/2023 for Sepsis. Edita Riley tolerated evaluation well today. She was resting in bed with no family present upon PT entry to room, agreeable to evaluation. Reports 9/10 generalized BLE pain but overall she is very happy, often laughing and joking with therapist. She indicates that she has been mostly sedentary in bed over last 3 days since admitted but looking forward to mobilizing. She is well-known to this therapist from prior admissions. Required some minor assistance to transition out of bed but otherwise stand-by assistance. Ambulates 300 ft in hallways on room air with rolling walker and stand-by assistance; gait is steady with no losses of balance, no fatigue or SOB noted. Comfortable reclined in bedside chair upon PT exit from room; obtained rolling walker from C&D and left in room for patient to utilize with nursing. Discussed PT role, POC, goals and recommendations (Home with family) with patient; verbalized understanding. Edita Riley would benefit from acute PT services to promote mobility during this admission and improve return to PLOF.    Problem List: weakness, impaired mobility, and impaired cardiopulmonary response to activity    Rehab Prognosis: Good; patient would benefit  "from acute skilled PT services to address these deficits and reach maximum level of function.    Plan:     Patient to be seen 2 x/week to address the above listed problems via gait training, therapeutic activities, therapeutic exercises    Plan of Care Expires: 03/18/23  Plan of Care reviewed with: patient    Subjective:     Communicated with RN prior to evaluation, appropriate to see for evaluation.    Pt found supine in bed (HOB elevated) upon PT entry to room, agreeable to evaluation.    Patient commenting: "I use my walker for long distances at home."    Does this patient have any cultural, spiritual, Druze conflicts given the current situation? Patient has no barriers to learning. Patient verbalizes understanding of his/her program and goals and demonstrates them correctly. No cultural, spiritual, or educational needs identified.    Past Medical History:   Diagnosis Date    Abnormal mammogram 08/25/2020    Acute blood loss anemia     Acute deep vein thrombosis (DVT) of lower extremity 12/09/2020    Advance care planning 04/30/2021    Anemia due to chronic blood loss     Anemia due to chronic kidney disease     Anxiety     Cardiovascular event risk -- low 09/14/2015    ASCVD 10-year risk 1.9% (with optimal risk factors 1.3%) as of 9/14/2015     Cervical cancer 2014    Chronic back pain     Colostomy care     Deep vein thrombosis     Depression     Diarrhea due to malabsorption 11/14/2018    Diarrhea due to malabsorption 11/14/2018    Difficult intubation     Disorder of kidney and ureter     DVT of lower extremity, bilateral 11/04/2020    Fibromyalgia     Fungemia 09/27/2020    Generalized abdominal pain 08/25/2020    GERD (gastroesophageal reflux disease)     Hemifacial spasm 09/16/2015    Hiatal hernia 2014    History of cervical cancer 10/11/2018    Hx of psychiatric care     Cymbalta, trazodone    Hypertension     Hypomagnesemia 11/21/2018    Lactose intolerance     Metastatic squamous cell carcinoma to " lymph node 10/11/2018    Nephrostomy tube displaced     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 drug 11/14/2018    Osteoarthritis of back     Peritonitis 09/22/2020    Pseudomonas urinary tract infection 04/21/2021    Psychiatric problem     Refusal of blood transfusions as patient is [employer]     Schatzki's ring 09/14/2015    Seen on outside EGD 05/2014, underwent esophageal dilatation. Bx were negative.     Seizures     Sleep stage dysfunction     Noted on PSG 06/2017; negative for obstructive sleep apnea     Stroke     Urinary tract infection associated with nephrostomy catheter 06/11/2020    Wound infection after surgery 09/24/2020      Past Surgical History:   Procedure Laterality Date    ANTEGRADE NEPHROSTOGRAPHY Bilateral 9/28/2020    Procedure: Nephrostogram - antegrade;  Surgeon: Leslie Balbuena MD;  Location: [facility] OR [address] [place] [address];  Service: Urology;  Laterality: Bilateral;    ANTEGRADE NEPHROSTOGRAPHY Left 4/20/2021    Procedure: NEPHROSTOGRAM, ANTEGRADE;  Surgeon: Leslie Balbuena MD;  [facility];  Service: Urology;  Laterality: Left;    ANTEGRADE NEPHROSTOGRAPHY Right 10/27/2022    Procedure: NEPHROSTOGRAM, ANTEGRADE;  Surgeon: Chirag Russ MD;  [facility];  Service: Urology;  Laterality: Right;    BILATERAL OOPHORECTOMY  2015    CHOLECYSTECTOMY  11/09/2016    Done at [facility], path showed chronic cholecystitis and gallstones    cold knife conization  2014    COLECTOMY, RIGHT  9/17/2020    Procedure: COLECTOMY, RIGHT Extended;  Surgeon: Hammad Reynolds MD;  Location: [facility] OR 2ND FLR;  Service: Colon and Rectal;;    COLONOSCOPY  2014    [facility]    COLONOSCOPY N/A 5/18/2018    Procedure: COLONOSCOPY;  Surgeon: Arden Gutiérrez MD;  Location: [facility] ENDO (4TH FLR);  Service: Endoscopy;  Laterality: N/A;    COLONOSCOPY N/A 7/28/2020    Procedure: COLONOSCOPY;  Surgeon: Hammad Reynolds MD;  Location: [facility] ENDO (4TH FLR);  Service:  Colon and Rectal;  Laterality: N/A;  [place]    CYSTOSCOPY WITH URETEROSCOPY, RETROGRADE PYELOGRAPHY, AND INSERTION OF STENT Bilateral 3/21/2020    Procedure: CYSTOSCOPY, WITH RETROGRADE PYELOGRAM,;  Surgeon: Leslie Balbuena MD;  Location: [facility] OR 1ST FLR;  Service: Urology;  Laterality: Bilateral;    DILATION OF NEPHROSTOMY TRACT Right 10/27/2022    Procedure: DILATION, NEPHROSTOMY TRACT;  Surgeon: Chirag Russ MD;  Location: [facility] OR 1ST FLR;  Service: Urology;  Laterality: Right;    ESOPHAGOGASTRODUODENOSCOPY  2014    ESOPHAGOGASTRODUODENOSCOPY  11/18/2020    ESOPHAGOGASTRODUODENOSCOPY N/A 11/18/2020    Procedure: ESOPHAGOGASTRODUODENOSCOPY (EGD);  Surgeon: Zenon Spencer MD;  Location: [facility];  Service: Endoscopy;  Laterality: N/A;    ESOPHAGOGASTRODUODENOSCOPY N/A 12/11/2020    Procedure: EGD (ESOPHAGOGASTRODUODENOSCOPY);  Surgeon: Juancho Muse MD;  Location: [facility] (2ND FLR);  Service: Endoscopy;  Laterality: N/A;    HYSTERECTOMY  2014    [facility] for cervical cancer    ILEOSTOMY  9/17/2020    Procedure: CREATION, ILEOSTOMY;  Surgeon: Hammad Reynolds MD;  Location: [facility] OR 2ND FLR;  Service: Colon and Rectal;;    LOOPOGRAM N/A 4/20/2021    Procedure: LOOPOGRAM;  Surgeon: Leslie Balbuena MD;  Location: [facility] OR 1ST FLR;  Service: Urology;  Laterality: N/A;    MOBILIZATION OF SPLENIC FLEXURE N/A 9/11/2020    Procedure: MOBILIZATION, COLONIC;  Surgeon: Hammad Reynolds MD;  Location: [facility] OR 2ND FLR;  Service: Colon and Rectal;  Laterality: N/A;    NEPHROSTOGRAPHY Bilateral 4/17/2021    Procedure: Nephrostogram;  Surgeon: Celeste Surgeon;  Location: [facility];  Service: Anesthesiology;  Laterality: Bilateral;    OOPHORECTOMY      PYELOSCOPY Right 10/27/2022    Procedure: PYELOSCOPY;  Surgeon: Chirag Russ MD;  Location: [facility] OR 1ST FLR;  Service: Urology;  Laterality: Right;    REPLACEMENT OF NEPHROSTOMY TUBE Right 10/27/2022    Procedure: REPLACEMENT, NEPHROSTOMY TUBE;   Surgeon: Chirag Russ MD;  Location: [facility] OR 1ST FLR;  Service: Urology;  Laterality: Right;    RETROPERITONEAL LYMPHADENECTOMY Right 9/17/2018    Procedure: LYMPHADENECTOMY, RETROPERITONEUM;  Surgeon: Sebas Reed MD;  Location: [facility] OR 2ND FLR;  Service: General;  Laterality: Right;  ILIAC    URETEROSCOPIC REMOVAL OF URETERIC CALCULUS Right 10/27/2022    Procedure: REMOVAL, CALCULUS, URETER, URETEROSCOPIC;  Surgeon: Chirag Russ MD;  Location: [facility] OR 1ST FLR;  Service: Urology;  Laterality: Right;    URETEROSCOPY Right 10/27/2022    Procedure: URETEROSCOPY-ANTEGRADE;  Surgeon: Chirag Russ MD;  Location: [facility] OR 1ST FLR;  Service: Urology;  Laterality: Right;       Living Environment:  Pt lives with her spouse and 23 year old daughter in a 1st story apartment with 3 MANAV, L HR.    PLOF:  Prior to admission, patient was independent for household ambulation but reports using a rolling walker for longer distances. She was independent with self-care, no longer driving or working.    DME:  Patient owns or has access to the following DME: Rolling Walker, Wheelchair, and stoma/nephrostomy supplies    Upon discharge, patient will have assistance from spouse, daughter.    Objective:     Patient found with: peripheral IV, nephrostomy tubes x 2, colostomy    Pain:  Pain Rating 1: 9/10 at generalized BLE; however no pain behaviors noted, very happy and joking throughout session  Pain Rating Post-Intervention 1: 9/10 (same, see above)    Cognitive Exam:  Patient is oriented to Person, Place, Time, and Situation.  Patient follows 100% of single-step commands.    Sensation:   Intact at BLE to LT    Lower Extremity Range of Motion:  Right Lower Extremity: WFL actively  Left Lower Extremity: WFL actively    Lower Extremity Strength:  Right Lower Extremity: grossly 4/5 via MMT  Left Lower Extremity: grossly 4/5 via MMT    Functional Mobility:    Bed Mobility:  Supine to Sitting: min (A) via hand-held for  trunk elevation    Transfers:  Sit to Stand: stand-by assistance from EOB with RW x 1 trial(s)    Gait:  300 feet in hallways on room air with rolling walker and stand-by assistance; gait is steady with no losses of balance, no fatigue or SOB noted    Assist level: Stand-By Assist  Device: Rolling walker    Balance:  Static Sit: Independent at EOB    Static Stand: Supervision with Rolling walker    Additional Therapeutic Activity/Exercises:     1. She was resting in bed with no family present upon PT entry to room, agreeable to evaluation. Reports 9/10 generalized BLE pain but overall she is very happy, often laughing and joking with therapist. She indicates that she has been mostly sedentary in bed over last 3 days since admitted but looking forward to mobilizing. She is well-known to this therapist from prior admissions.    2. Required some minor assistance to transition out of bed but otherwise stand-by assistance. Ambulates 300 ft in hallways on room air with rolling walker and stand-by assistance; gait is steady with no losses of balance, no fatigue or SOB noted.    3. Comfortable reclined in bedside chair upon PT exit from room; obtained rolling walker from C&D and left in room for patient to utilize with nursing.    4. Discussed PT role, POC, goals and recommendations (Home with family) with patient; verbalized understanding.    AM-PAC 6 CLICK MOBILITY  Turning over in bed (including adjusting bedclothes, sheets and blankets)?: 4  Sitting down on and standing up from a chair with arms (e.g., wheelchair, bedside commode, etc.): 4  Moving from lying on back to sitting on the side of the bed?: 3  Moving to and from a bed to a chair (including a wheelchair)?: 4  Need to walk in hospital room?: 4  Climbing 3-5 steps with a railing?: 3  Basic Mobility Total Score: 22    Patient was left reclined in bedside chair with all lines intact and call button in reach.    Clinical Decision Making for Evaluation  Complexity:  1. Body System(s) Examination: 1-2  2. Clinical Presentation: Evolving  3. Evaluation Complexity: Low    GOALS:   Multidisciplinary Problems       Physical Therapy Goals          Problem: Physical Therapy    Goal Priority Disciplines Outcome Goal Variances Interventions   Physical Therapy Goal     PT, PT/OT      Description: Goals to be met by: 3/2/23     Patient will increase functional independence with mobility by performin. Supine to sit with Stand-by Assistance - Not met  2. Sit to stand transfer with Supervision from bedside chair with or without RW - Not met  3. Gait  x 500 feet with Supervision using Rolling Walker - Not met  4. Ascend/descend 3 MANAV with left Handrails Stand-by Assistance using No Assistive Device - Not met                     Tim Valenzuela, PT  2023

## 2023-02-16 NOTE — ASSESSMENT & PLAN NOTE
This patient does have evidence of infective focus  My overall impression is sepsis.  Source: Urinary Tract  Antibiotics given-   Antibiotics (72h ago, onward)    Start     Stop Route Frequency Ordered    02/14/23 2100  linezolid tablet 600 mg         -- Oral Every 12 hours 02/14/23 1642 02/14/23 1745  cefTRIAXone (ROCEPHIN) 1 g in dextrose 5 % in water (D5W) 5 % 50 mL IVPB (MB+)         -- IV Every 12 hours (non-standard times) 02/14/23 1642        Latest lactate reviewed-  No results for input(s): LACTATE in the last 72 hours.  Organ dysfunction indicated by Acute kidney injury    Fluid challenge Ideal Body Weight- The patient's ideal body weight is Ideal body weight: 66.2 kg (145 lb 15.1 oz) which will be used to calculate fluid bolus of 30 ml/kg for treatment of septic shock.      Post- resuscitation assessment Yes Perfusion exam was performed within 6 hours of septic shock presentation after bolus shows Adequate tissue perfusion assessed by non-invasive monitoring       Will not start on vassopressures  Source control achieved by: IV Abx  Fever is resolved,urine culture growing GNR

## 2023-02-16 NOTE — PROGRESS NOTES
[facility] - [facility] Medicine  Progress Note    Patient Name: Edita Riley  MRN: [id number]  Patient Class: IP- Inpatient   Admission Date: 2/13/2023  Length of Stay: 1 days  Attending Physician: Alessandra Juarez MD  Primary Care Provider: Primary Doctor No        Subjective:     Principal Problem:Sepsis        HPI:  Ms. 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    Patient was previously well until earlier today when she bent over and felt her L nephrostomy tube dislodge. She has some mild pain around the site but otherwise no new complaints. Denies fevers/chills, cough/congestion, nausea/vomiting, changes in output.    Tobacco use: denies  EtOH use: denies  Illicit drug use: denies  Functional status: completes ADLs independently  Baseline mental status: AAOx3  Living situation: lives with daughter and     ED Course:  Afebrile, hypotensive (90s/50s). Satting well on RA. Initial labs notable for improved renal function. IR consulted with plan for replacement of nephrostomy tube, tentatively on 2/14.      Overview/Hospital Course:  Ms. 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    Patient was previously well until earlier today when she bent over and felt her L nephrostomy tube dislodge. She has some mild pain around the site but otherwise no new complaints. Denies fevers/chills, cough/congestion, nausea/vomiting, changes in output.  ED  Course:  Afebrile, hypotensive (90s/50s). Satting well on RA. Initial labs notable for improved renal function. IR consulted with plan for replacement of nephrostomy tube, tentatively on 2/14.    IR is consulted.S/P Left nephrostomy tube replacement and exchange of right nephrostomy tube.received empiric IV Abx and started on IVF for mild increased Cr.  Patient started be septic after nephrostomy exchange with fever 101.8,tachycardia,check urine  and blood culture,started on Abx,will monitor.fever is resolved,urine culture  growing GNR.  ARF on CKD 3 is much improved.  Consulted PT,OT        Past Medical History:   Diagnosis Date    Abnormal mammogram 08/25/2020    Acute blood loss anemia     Acute deep vein thrombosis (DVT) of lower extremity 12/09/2020    Advance care planning 04/30/2021    Anemia due to chronic blood loss     Anemia due to chronic kidney disease     Anxiety     Cardiovascular event risk -- low 09/14/2015    ASCVD 10-year risk 1.9% (with optimal risk factors 1.3%) as of 9/14/2015     Cervical cancer 2014    Chronic back pain     Colostomy care     Deep vein thrombosis     Depression     Diarrhea due to malabsorption 11/14/2018    Diarrhea due to malabsorption 11/14/2018    Difficult intubation     Disorder of kidney and ureter     DVT of lower extremity, bilateral 11/04/2020    Fibromyalgia     Fungemia 09/27/2020    Generalized abdominal pain 08/25/2020    GERD (gastroesophageal reflux disease)     Hemifacial spasm 09/16/2015    Hiatal hernia 2014    History of cervical cancer 10/11/2018    Hx of psychiatric care     Cymbalta, trazodone    Hypertension     Hypomagnesemia 11/21/2018    Lactose intolerance     Metastatic squamous cell carcinoma to lymph node 10/11/2018    Nephrostomy tube displaced     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 drug 11/14/2018    Osteoarthritis of back     Peritonitis 09/22/2020    Pseudomonas urinary tract infection 04/21/2021     Psychiatric problem     Refusal of blood transfusions as patient is Christian     Schatzki's ring 09/14/2015    Seen on outside EGD 05/2014, underwent esophageal dilatation. Bx were negative.     Seizures     Sleep stage dysfunction     Noted on PSG 06/2017; negative for obstructive sleep apnea     Stroke     Urinary tract infection associated with nephrostomy catheter 06/11/2020    Wound infection after surgery 09/24/2020       Past Surgical History:   Procedure Laterality Date    ANTEGRADE NEPHROSTOGRAPHY Bilateral 9/28/2020    Procedure: Nephrostogram - antegrade;  Surgeon: Leslie Balbuena MD;  [facility];  Service: Urology;  Laterality: Bilateral;    ANTEGRADE NEPHROSTOGRAPHY Left 4/20/2021    Procedure: NEPHROSTOGRAM, ANTEGRADE;  Surgeon: Leslie Balbuena MD;  [facility];  Service: Urology;  Laterality: Left;    ANTEGRADE NEPHROSTOGRAPHY Right 10/27/2022    Procedure: NEPHROSTOGRAM, ANTEGRADE;  Surgeon: Chirag Russ MD;  [facility];  Service: Urology;  Laterality: Right;    BILATERAL OOPHORECTOMY  2015    CHOLECYSTECTOMY  11/09/2016    Done at [facility], path showed chronic cholecystitis and gallstones    cold knife conization  2014    COLECTOMY, RIGHT  9/17/2020    Procedure: COLECTOMY, RIGHT Extended;  Surgeon: Hammad Reynolds MD;  Location: [facility] OR 2ND FLR;  Service: Colon and Rectal;;    COLONOSCOPY  2014    [facility]    COLONOSCOPY N/A 5/18/2018    Procedure: COLONOSCOPY;  Surgeon: Arden Gutiérrez MD;  Location: [facility] (4TH FLR);  Service: Endoscopy;  Laterality: N/A;    COLONOSCOPY N/A 7/28/2020    Procedure: COLONOSCOPY;  Surgeon: Hammad Reynolds MD;  Location: [facility] ENDO (4TH FLR);  Service: Colon and Rectal;  Laterality: N/A;  [place]    CYSTOSCOPY WITH URETEROSCOPY, RETROGRADE PYELOGRAPHY, AND INSERTION OF STENT Bilateral 3/21/2020    Procedure: CYSTOSCOPY, WITH  RETROGRADE PYELOGRAM,;  Surgeon: Leslie Balbuena MD;  Location: [facility] OR 1ST FLR;  Service: Urology;  Laterality: Bilateral;    DILATION OF NEPHROSTOMY TRACT Right 10/27/2022    Procedure: DILATION, NEPHROSTOMY TRACT;  Surgeon: Chirag Russ MD;  Location: [facility] OR 1ST FLR;  Service: Urology;  Laterality: Right;    ESOPHAGOGASTRODUODENOSCOPY  2014    ESOPHAGOGASTRODUODENOSCOPY  11/18/2020    ESOPHAGOGASTRODUODENOSCOPY N/A 11/18/2020    Procedure: ESOPHAGOGASTRODUODENOSCOPY (EGD);  Surgeon: Zenon Spencer MD;  Location: [facility] ENDO;  Service: Endoscopy;  Laterality: N/A;    ESOPHAGOGASTRODUODENOSCOPY N/A 12/11/2020    Procedure: EGD (ESOPHAGOGASTRODUODENOSCOPY);  Surgeon: Juancho Muse MD;  Location: [facility] (2ND FLR);  Service: Endoscopy;  Laterality: N/A;    HYSTERECTOMY  2014    [facility] for cervical cancer    ILEOSTOMY  9/17/2020    Procedure: CREATION, ILEOSTOMY;  Surgeon: Hammad Reynolds MD;  Location: [facility] OR 2ND FLR;  Service: Colon and Rectal;;    LOOPOGRAM N/A 4/20/2021    Procedure: LOOPOGRAM;  Surgeon: Leslie Balbuena MD;  Location: [facility] OR 1ST FLR;  Service: Urology;  Laterality: N/A;    MOBILIZATION OF SPLENIC FLEXURE N/A 9/11/2020    Procedure: MOBILIZATION, COLONIC;  Surgeon: Hammad Reynolds MD;  Location: [facility] OR 2ND FLR;  Service: Colon and Rectal;  Laterality: N/A;    NEPHROSTOGRAPHY Bilateral 4/17/2021    Procedure: Nephrostogram;  Surgeon: Celeste Surgeon;  Location: [facility] CELESTE;  Service: Anesthesiology;  Laterality: Bilateral;    OOPHORECTOMY      PYELOSCOPY Right 10/27/2022    Procedure: PYELOSCOPY;  Surgeon: Chirag Russ MD;  Location: [facility] OR 1ST FLR;  Service: Urology;  Laterality: Right;    REPLACEMENT OF NEPHROSTOMY TUBE Right 10/27/2022    Procedure: REPLACEMENT, NEPHROSTOMY TUBE;  Surgeon: Chirag Russ MD;  Location: [facility] OR 1ST FLR;  Service: Urology;  Laterality: Right;    RETROPERITONEAL LYMPHADENECTOMY Right 9/17/2018    Procedure: LYMPHADENECTOMY,  RETROPERITONEUM;  Surgeon: Sebas Reed MD;  Location: [facility] OR 2ND FLR;  Service: General;  Laterality: Right;  ILIAC    URETEROSCOPIC REMOVAL OF URETERIC CALCULUS Right 10/27/2022    Procedure: REMOVAL, CALCULUS, URETER, URETEROSCOPIC;  Surgeon: Chirag Russ MD;  Location: [facility] OR 1ST FLR;  Service: Urology;  Laterality: Right;    URETEROSCOPY Right 10/27/2022    Procedure: URETEROSCOPY-ANTEGRADE;  Surgeon: Chirag Russ MD;  Location: [facility] OR [address] [place] [address];  Service: Urology;  Laterality: Right;       Review of patient's allergies indicates:   Allergen Reactions    Bee sting [allergen ext-venom-honey bee]      Rash      Grass pollen-bermuda, standard      rash       No current facility-administered medications on file prior to encounter.     Current Outpatient Medications on File Prior to Encounter   Medication Sig    acetaminophen (TYLENOL) 325 MG tablet Take 2 tablets (650 mg total) by mouth every 6 (six) hours as needed for Pain.    gabapentin (NEURONTIN) 100 MG capsule Take 2 capsules (200 mg total) by mouth every evening.    ketorolac (TORADOL) 10 mg tablet Take 1 tablet (10 mg total) by mouth every 6 (six) hours as needed for Pain.    melatonin (MELATIN) 3 mg tablet Take 2 tablets (6 mg total) by mouth nightly as needed for Insomnia.    mirtazapine (REMERON SOL-TAB) 15 MG disintegrating tablet Dissolve 1 tablet (15 mg total) by mouth nightly.    sodium bicarbonate 650 MG tablet Take 2 tablets (1,300 mg total) by mouth 2 (two) times daily.    sucralfate (CARAFATE) 1 gram tablet Take 1 tablet (1 g total) by mouth 4 (four) times daily before meals and nightly.    traMADoL (ULTRAM) 50 mg tablet Take 1 tablet (50 mg total) by mouth every 6 (six) hours.     Family History       Problem Relation (Age of Onset)    Breast cancer Maternal Aunt    Cancer Father, Mother    Cervical cancer Mother    Colon cancer Father    Drug abuse Daughter, Daughter    Esophageal cancer Father    Heart disease  Sister, Maternal Grandmother    Suicide Daughter          Tobacco Use    Smoking status: Never    Smokeless tobacco: Never   Substance and Sexual Activity    Alcohol use: No     Alcohol/week: 0.0 standard drinks    Drug use: No    Sexual activity: Yes     Partners: Male     Birth control/protection: None     Comment:  19 years since 1999     Review of Systems   Constitutional:  Positive for chills (occasional). Negative for fever.   HENT:  Negative for congestion, sore throat and trouble swallowing.    Eyes:  Negative for visual disturbance.   Respiratory:  Negative for cough, shortness of breath and wheezing.    Cardiovascular:  Negative for chest pain, palpitations and leg swelling.   Gastrointestinal:  Negative for abdominal pain, blood in stool, constipation, diarrhea, nausea and vomiting.   Genitourinary:  Positive for flank pain (L-sided from nephrostomy site).   Musculoskeletal:  Negative for arthralgias and myalgias.   Skin:  Negative for rash.   Neurological:  Negative for dizziness, light-headedness, numbness and headaches.   Psychiatric/Behavioral:  Negative for agitation and confusion.    Objective:     Vital Signs (Most Recent):  Temp: 97.4 °F (36.3 °C) (02/16/23 0738)  Pulse: 76 (02/16/23 0738)  Resp: 18 (02/16/23 0738)  BP: 119/63 (02/16/23 0738)  SpO2: 97 % (02/16/23 0738)   Vital Signs (24h Range):  Temp:  [97.4 °F (36.3 °C)-99.2 °F (37.3 °C)] 97.4 °F (36.3 °C)  Pulse:  [75-86] 76  Resp:  [16-18] 18  SpO2:  [95 %-99 %] 97 %  BP: ()/(54-68) 119/63     Weight: 85.7 kg (189 lb)  Body mass index is 27.91 kg/m².    Physical Exam  Constitutional:       General: She is not in acute distress.     Appearance: She is well-developed. She is not ill-appearing.   HENT:      Head: Normocephalic and atraumatic.      Mouth/Throat:      Pharynx: No oropharyngeal exudate.   Eyes:      Conjunctiva/sclera: Conjunctivae normal.      Pupils: Pupils are equal, round, and reactive to light.    Cardiovascular:      Rate and Rhythm: Normal rate and regular rhythm.      Heart sounds: Normal heart sounds. No murmur heard.  Pulmonary:      Effort: Pulmonary effort is normal. No respiratory distress.      Breath sounds: Normal breath sounds. No wheezing or rales.   Abdominal:      General: Bowel sounds are normal. There is no distension.      Palpations: Abdomen is soft.      Tenderness: There is no abdominal tenderness. There is no guarding.      Comments: RLQ ileostomy in place  LLQ urostomy in place   Genitourinary:     Comments: R nephrostomy in place with drainage of clear yellow fluid  L nephrostomy site open with scant drainage of yellow fluid, no nephrostomy tube in place  No purulence/bleeding from either site    Bilateral flank PCN insertion sites with scant drainage of clear yellow fluid; no purulent or bleeding noted   Musculoskeletal:         General: No tenderness.      Cervical back: Normal range of motion and neck supple.   Skin:     General: Skin is warm and dry.      Findings: No rash.   Neurological:      General: No focal deficit present.      Mental Status: She is alert and oriented to person, place, and time. Mental status is at baseline.      Cranial Nerves: No cranial nerve deficit.      Motor: No abnormal muscle tone.   Psychiatric:         Behavior: Behavior normal.       Significant Labs: All pertinent labs within the past 24 hours have been reviewed.  CBC:   Recent Labs   Lab 02/15/23  0416 02/16/23  0005 02/16/23 0415   WBC 11.61 9.17 8.76   HGB 11.6* 10.6* 10.7*   HCT 38.9 35.4* 36.3*    218 226       CMP:   Recent Labs   Lab 02/15/23  0415 02/16/23  0415    137   K 3.5 3.6   * 109   CO2 18* 21*    82   BUN 17 13   CREATININE 1.5* 1.4   CALCIUM 8.6* 8.6*   ANIONGAP 10 7*         Significant Imaging: I have reviewed and interpreted all pertinent imaging results/findings within the past 24 hours.      Assessment/Plan:      * Sepsis  This patient does have  evidence of infective focus  My overall impression is sepsis.  Source: Urinary Tract  Antibiotics given-   Antibiotics (72h ago, onward)    Start     Stop Route Frequency Ordered    02/14/23 2100  linezolid tablet 600 mg         -- Oral Every 12 hours 02/14/23 1642 02/14/23 1745  cefTRIAXone (ROCEPHIN) 1 g in dextrose 5 % in water (D5W) 5 % 50 mL IVPB (MB+)         -- IV Every 12 hours (non-standard times) 02/14/23 1642        Latest lactate reviewed-  No results for input(s): LACTATE in the last 72 hours.  Organ dysfunction indicated by Acute kidney injury    Fluid challenge Ideal Body Weight- The patient's ideal body weight is Ideal body weight: 66.2 kg (145 lb 15.1 oz) which will be used to calculate fluid bolus of 30 ml/kg for treatment of septic shock.      Post- resuscitation assessment Yes Perfusion exam was performed within 6 hours of septic shock presentation after bolus shows Adequate tissue perfusion assessed by non-invasive monitoring       Will not start on vassopressures  Source control achieved by: IV Abx  Fever is resolved,urine culture growing GNR    Urinary tract infection without hematuria  Urine culture growing GNR      Acute renal failure superimposed on stage 3 chronic kidney disease  Stared on IVF,will monitor.  Improved.      CKD (chronic kidney disease), stage III  Will monitor.      Nephrostomy status  Will monitor,     Presence of urostomy  H/o cervical cancer s/p HYST/chemo/XRT  Complicated by radiation cystitis s/p colon conduit  Developed bowel perforation from colon conduit anastamosis s/p hemicolectomy + ileostomy  S/p urostomy + BL nephrostomy tubes    Ileostomy in place  Will monitor.     Bilateral ureteral obstruction    IR is consulted.S/P Left nephrostomy tube replacement and exchange of right nephrostomy tube.receiving empiric IV Abx and started on IVF for mild increased Cr.  S/P exchanged nephrotomy tube.    Radiation cystitis       Displacement of nephrostomy tube  59 yo F  with BL ureteral obstruction 2/2 radiation cystitis s/p urostomy + BL nephrostomy tube placement admitted with L nephrostomy tube dislodgement. Afebrile, hypotensive (90s/50s). Satting well on RA. Initial labs notable for improved renal function. IR consulted with plan for replacement of nephrostomy tube, tentatively on 2/14.    IR is consulted.S/P Left nephrostomy tube replacement and exchange of right nephrostomy tube.receiving empiric IV Abx and started on IVF for mild increased Cr.    Cervical cancer          VTE Risk Mitigation (From admission, onward)         Ordered     Reason for No Pharmacological VTE Prophylaxis  Once        Question:  Reasons:  Answer:  Physician Provided (leave comment)  Comment:  procedure    02/13/23 2315     IP VTE HIGH RISK PATIENT  Once         02/13/23 2315     Place sequential compression device  Until discontinued         02/13/23 2315     Place sequential compression device  Until discontinued         02/13/23 2236                Discharge Planning   OK: 2/16/2023     Code Status: Full Code   Is the patient medically ready for discharge?:     Reason for patient still in hospital (select all that apply): Patient trending condition  Discharge Plan A: Home with family                  Alessandra Juarez MD  Department of Hospital Medicine   [facility] - Surgery

## 2023-02-16 NOTE — PLAN OF CARE
Problem: Adult Inpatient Plan of Care  Goal: Plan of Care Review  Outcome: Ongoing, Not Progressing  Goal: Patient-Specific Goal (Individualized)  Outcome: Ongoing, Not Progressing  Goal: Absence of Hospital-Acquired Illness or Injury  Outcome: Ongoing, Not Progressing  Goal: Optimal Comfort and Wellbeing  Outcome: Ongoing, Not Progressing  Goal: Readiness for Transition of Care  Outcome: Ongoing, Not Progressing     Problem: Fluid and Electrolyte Imbalance (Acute Kidney Injury/Impairment)  Goal: Fluid and Electrolyte Balance  Outcome: Ongoing, Not Progressing     Problem: Oral Intake Inadequate (Acute Kidney Injury/Impairment)  Goal: Optimal Nutrition Intake  Outcome: Ongoing, Not Progressing

## 2023-02-16 NOTE — PLAN OF CARE
Problem: Occupational Therapy  Goal: Occupational Therapy Goal  Description: Goals to be met by: 3/2/2023     Patient will increase functional independence with ADLs by performing:    UE Dressing with Set-up Assistance.  LE Dressing with Minimal Assistance.  Grooming while standing at sink with Set-up Assistance.  Step transfer with Supervision using AD PRN.    Outcome: Ongoing, Progressing     Evaluation complete; goals and POC established. Pt would benefit from discharging home with further OT services to return to PLOF.    2/16/2023

## 2023-02-17 PROBLEM — G43.009 MIGRAINE WITHOUT AURA AND WITHOUT STATUS MIGRAINOSUS, NOT INTRACTABLE: Status: ACTIVE | Noted: 2023-02-17

## 2023-02-17 PROBLEM — N39.0 URINARY TRACT INFECTION WITHOUT HEMATURIA: Status: RESOLVED | Noted: 2023-02-16 | Resolved: 2023-02-17

## 2023-02-17 PROBLEM — N30.01 ACUTE CYSTITIS WITH HEMATURIA: Status: RESOLVED | Noted: 2023-02-17 | Resolved: 2023-02-17

## 2023-02-17 PROBLEM — N30.01 ACUTE CYSTITIS WITH HEMATURIA: Status: ACTIVE | Noted: 2023-02-17

## 2023-02-17 LAB
ANION GAP SERPL CALC-SCNC: 10 MMOL/L (ref 8–16)
BACTERIA UR CULT: ABNORMAL
BASOPHILS # BLD AUTO: 0.04 K/UL (ref 0–0.2)
BASOPHILS NFR BLD: 0.5 % (ref 0–1.9)
BUN SERPL-MCNC: 14 MG/DL (ref 6–20)
CALCIUM SERPL-MCNC: 8.6 MG/DL (ref 8.7–10.5)
CHLORIDE SERPL-SCNC: 107 MMOL/L (ref 95–110)
CO2 SERPL-SCNC: 22 MMOL/L (ref 23–29)
CREAT SERPL-MCNC: 1.4 MG/DL (ref 0.5–1.4)
DIFFERENTIAL METHOD: ABNORMAL
EOSINOPHIL # BLD AUTO: 0.3 K/UL (ref 0–0.5)
EOSINOPHIL NFR BLD: 3.1 % (ref 0–8)
ERYTHROCYTE [DISTWIDTH] IN BLOOD BY AUTOMATED COUNT: 14.6 % (ref 11.5–14.5)
EST. GFR  (NO RACE VARIABLE): 43.1 ML/MIN/1.73 M^2
GLUCOSE SERPL-MCNC: 90 MG/DL (ref 70–110)
HCT VFR BLD AUTO: 36.2 % (ref 37–48.5)
HGB BLD-MCNC: 11 G/DL (ref 12–16)
IMM GRANULOCYTES # BLD AUTO: 0.02 K/UL (ref 0–0.04)
IMM GRANULOCYTES NFR BLD AUTO: 0.2 % (ref 0–0.5)
LYMPHOCYTES # BLD AUTO: 1.7 K/UL (ref 1–4.8)
LYMPHOCYTES NFR BLD: 21.7 % (ref 18–48)
MCH RBC QN AUTO: 27 PG (ref 27–31)
MCHC RBC AUTO-ENTMCNC: 30.4 G/DL (ref 32–36)
MCV RBC AUTO: 89 FL (ref 82–98)
MONOCYTES # BLD AUTO: 1 K/UL (ref 0.3–1)
MONOCYTES NFR BLD: 12.3 % (ref 4–15)
NEUTROPHILS # BLD AUTO: 5 K/UL (ref 1.8–7.7)
NEUTROPHILS NFR BLD: 62.2 % (ref 38–73)
NRBC BLD-RTO: 0 /100 WBC
PLATELET # BLD AUTO: 242 K/UL (ref 150–450)
PMV BLD AUTO: 10.6 FL (ref 9.2–12.9)
POTASSIUM SERPL-SCNC: 3.5 MMOL/L (ref 3.5–5.1)
RBC # BLD AUTO: 4.08 M/UL (ref 4–5.4)
SODIUM SERPL-SCNC: 139 MMOL/L (ref 136–145)
WBC # BLD AUTO: 8.03 K/UL (ref 3.9–12.7)

## 2023-02-17 PROCEDURE — 99232 SBSQ HOSP IP/OBS MODERATE 35: CPT | Mod: ,,, | Performed by: HOSPITALIST

## 2023-02-17 PROCEDURE — 25000003 PHARM REV CODE 250: Performed by: EMERGENCY MEDICINE

## 2023-02-17 PROCEDURE — 25000003 PHARM REV CODE 250: Performed by: STUDENT IN AN ORGANIZED HEALTH CARE EDUCATION/TRAINING PROGRAM

## 2023-02-17 PROCEDURE — 99232 PR SUBSEQUENT HOSPITAL CARE,LEVL II: ICD-10-PCS | Mod: ,,, | Performed by: HOSPITALIST

## 2023-02-17 PROCEDURE — 25000003 PHARM REV CODE 250: Performed by: HOSPITALIST

## 2023-02-17 PROCEDURE — 63600175 PHARM REV CODE 636 W HCPCS: Performed by: HOSPITALIST

## 2023-02-17 PROCEDURE — 94761 N-INVAS EAR/PLS OXIMETRY MLT: CPT

## 2023-02-17 PROCEDURE — 11000001 HC ACUTE MED/SURG PRIVATE ROOM

## 2023-02-17 RX ORDER — ACETAMINOPHEN 500 MG
1000 TABLET ORAL ONCE
Status: COMPLETED | OUTPATIENT
Start: 2023-02-17 | End: 2023-02-17

## 2023-02-17 RX ORDER — PROCHLORPERAZINE EDISYLATE 5 MG/ML
10 INJECTION INTRAMUSCULAR; INTRAVENOUS ONCE
Status: COMPLETED | OUTPATIENT
Start: 2023-02-17 | End: 2023-02-17

## 2023-02-17 RX ORDER — KETOROLAC TROMETHAMINE 15 MG/ML
15 INJECTION, SOLUTION INTRAMUSCULAR; INTRAVENOUS ONCE
Status: COMPLETED | OUTPATIENT
Start: 2023-02-17 | End: 2023-02-17

## 2023-02-17 RX ORDER — MAGNESIUM SULFATE HEPTAHYDRATE 40 MG/ML
2 INJECTION, SOLUTION INTRAVENOUS ONCE
Status: COMPLETED | OUTPATIENT
Start: 2023-02-17 | End: 2023-02-17

## 2023-02-17 RX ORDER — CIPROFLOXACIN 500 MG/1
500 TABLET ORAL EVERY 12 HOURS
Status: DISCONTINUED | OUTPATIENT
Start: 2023-02-17 | End: 2023-02-18 | Stop reason: HOSPADM

## 2023-02-17 RX ORDER — DIPHENHYDRAMINE HYDROCHLORIDE 50 MG/ML
25 INJECTION INTRAMUSCULAR; INTRAVENOUS ONCE
Status: COMPLETED | OUTPATIENT
Start: 2023-02-17 | End: 2023-02-17

## 2023-02-17 RX ADMIN — DIPHENHYDRAMINE HYDROCHLORIDE 25 MG: 50 INJECTION, SOLUTION INTRAMUSCULAR; INTRAVENOUS at 11:02

## 2023-02-17 RX ADMIN — CEFTRIAXONE 1 G: 1 INJECTION, POWDER, FOR SOLUTION INTRAMUSCULAR; INTRAVENOUS at 06:02

## 2023-02-17 RX ADMIN — SODIUM CHLORIDE: 4.5 INJECTION, SOLUTION INTRAVENOUS at 05:02

## 2023-02-17 RX ADMIN — LINEZOLID 600 MG: 600 TABLET, FILM COATED ORAL at 09:02

## 2023-02-17 RX ADMIN — KETOROLAC TROMETHAMINE 15 MG: 15 INJECTION, SOLUTION INTRAMUSCULAR; INTRAVENOUS at 11:02

## 2023-02-17 RX ADMIN — ACETAMINOPHEN 1000 MG: 500 TABLET ORAL at 11:02

## 2023-02-17 RX ADMIN — MAGNESIUM OXIDE TAB 400 MG (241.3 MG ELEMENTAL MG) 400 MG: 400 (241.3 MG) TAB at 09:02

## 2023-02-17 RX ADMIN — ACETAMINOPHEN 650 MG: 325 TABLET ORAL at 11:02

## 2023-02-17 RX ADMIN — POTASSIUM & SODIUM PHOSPHATES POWDER PACK 280-160-250 MG 1 PACKET: 280-160-250 PACK at 09:02

## 2023-02-17 RX ADMIN — MAGNESIUM SULFATE 2 G: 2 INJECTION INTRAVENOUS at 12:02

## 2023-02-17 RX ADMIN — SODIUM CHLORIDE: 4.5 INJECTION, SOLUTION INTRAVENOUS at 03:02

## 2023-02-17 RX ADMIN — GABAPENTIN 200 MG: 100 CAPSULE ORAL at 09:02

## 2023-02-17 RX ADMIN — POTASSIUM & SODIUM PHOSPHATES POWDER PACK 280-160-250 MG 1 PACKET: 280-160-250 PACK at 06:02

## 2023-02-17 RX ADMIN — POTASSIUM & SODIUM PHOSPHATES POWDER PACK 280-160-250 MG 1 PACKET: 280-160-250 PACK at 11:02

## 2023-02-17 RX ADMIN — SODIUM BICARBONATE 1300 MG: 650 TABLET ORAL at 09:02

## 2023-02-17 RX ADMIN — PROCHLORPERAZINE EDISYLATE 10 MG: 5 INJECTION INTRAMUSCULAR; INTRAVENOUS at 11:02

## 2023-02-17 RX ADMIN — SODIUM CHLORIDE 500 ML: 9 INJECTION, SOLUTION INTRAVENOUS at 11:02

## 2023-02-17 RX ADMIN — POTASSIUM & SODIUM PHOSPHATES POWDER PACK 280-160-250 MG 1 PACKET: 280-160-250 PACK at 05:02

## 2023-02-17 RX ADMIN — Medication 6 MG: at 09:02

## 2023-02-17 RX ADMIN — CIPROFLOXACIN 500 MG: 500 TABLET, FILM COATED ORAL at 09:02

## 2023-02-17 NOTE — ASSESSMENT & PLAN NOTE
Pt was prescribed toradol 15 mg, 10 mg of compazine, 25 mg of benadryl, 500 cc of NS bolus, 1000 mg of tylenol and 2 grams of mag.

## 2023-02-17 NOTE — ASSESSMENT & PLAN NOTE
2/2 complicated uti.   Urine cx grew klebsielle that is resistant to ceftriaxone.  Will stop ceftriaxone and linezolid. Start cipro 500 mg bid.   sepsis resolved.

## 2023-02-17 NOTE — PROGRESS NOTES
[facility] Medicine  Progress Note    Patient Name: Edita Riley  MRN: [id number]  Patient Class: IP- Inpatient   Admission Date: 2/13/2023  Length of Stay: 2 days  Attending Physician: Fransisca Martinez MD  Primary Care Provider: Primary Doctor No        Subjective:     Principal Problem:Displacement of nephrostomy tube        HPI:  Ms. Riley is a 59 yo F with cervical cancer s/p HYST/chemo/XRT, BL ureteral obstruction 2/2 radiation cystitis s/p colon conduit (ultimately required BL nephrostomy tube placement, h/o bowel perforation from colon conduit anastomosis s/p hemicolectomy + ileostomy creation that presents with dislodged nephrostomy tubes.    Patient was previously well until earlier today when she bent over and felt her L nephrostomy tube dislodge. She has some mild pain around the site but otherwise no new complaints. Denies fevers/chills, cough/congestion, nausea/vomiting, changes in output.    Tobacco use: denies  EtOH use: denies  Illicit drug use: denies  Functional status: completes ADLs independently  Baseline mental status: AAOx3  Living situation: lives with daughter and     ED Course:  Afebrile, hypotensive (90s/50s). Satting well on RA. Initial labs notable for improved renal function. IR consulted with plan for replacement of nephrostomy tube, tentatively on 2/14.      Overview/Hospital Course:  Ms. Riley is a 59 yo F with cervical cancer s/p HYST/chemo/XRT, BL ureteral obstruction 2/2 radiation cystitis s/p colon conduit (ultimately required BL nephrostomy tube placement, h/o bowel perforation from colon conduit anastomosis s/p hemicolectomy + ileostomy creation that presents with dislodged nephrostomy tubes.    Patient was previously well until earlier today when she bent over and felt her L nephrostomy tube dislodge. She has some mild pain around the site but otherwise no new complaints. Denies fevers/chills, cough/congestion, nausea/vomiting,  changes in output.  ED Course:  Afebrile, hypotensive (90s/50s). Satting well on RA. Initial labs notable for improved renal function. IR consulted with plan for replacement of nephrostomy tube, tentatively on 2/14.    IR is consulted.S/P Left nephrostomy tube replacement and exchange of right nephrostomy tube.received empiric IV Abx and started on IVF for mild increased Cr.  Patient started be septic after nephrostomy exchange with fever 101.8,tachycardia,check urine  and blood culture,started on Abx,will monitor.fever is resolved,urine culture  growing GNR.  ARF on CKD 3 is much improved.  Consulted PT,OT    On 2/17; pt had severe migraine headache. Frontal, 10/10 and doesn't radiate. No nausea or vomiting.       Interval History: pt has severe migraine headache, 10/10, frontal and doesn't radiate.     Review of Systems   Constitutional:  Negative for chills and fever.   HENT:  Negative for sore throat.    Respiratory:  Negative for cough and shortness of breath.    Cardiovascular:  Negative for chest pain and palpitations.   Gastrointestinal:  Negative for abdominal pain, nausea and vomiting.   Endocrine: Negative for cold intolerance and heat intolerance.   Genitourinary:  Negative for dysuria, frequency and urgency.   Neurological:  Positive for headaches. Negative for seizures and syncope.   Psychiatric/Behavioral:  Negative for confusion.    Objective:     Vital Signs (Most Recent):  Temp: 98.4 °F (36.9 °C) (02/17/23 1120)  Pulse: 82 (02/17/23 1120)  Resp: 18 (02/17/23 1120)  BP: 122/66 (02/17/23 1120)  SpO2: (!) 94 % (02/17/23 1120)   Vital Signs (24h Range):  Temp:  [98.1 °F (36.7 °C)-98.5 °F (36.9 °C)] 98.4 °F (36.9 °C)  Pulse:  [75-88] 82  Resp:  [16-18] 18  SpO2:  [94 %-97 %] 94 %  BP: (107-129)/(55-66) 122/66     Weight: 85.7 kg (189 lb)  Body mass index is 27.91 kg/m².    Intake/Output Summary (Last 24 hours) at 2/17/2023 1455  Last data filed at 2/17/2023 1120  Gross per 24 hour   Intake --   Output  2500 ml   Net -2500 ml      Physical Exam  Vitals reviewed.   Constitutional:       Appearance: Normal appearance.   HENT:      Head: Normocephalic and atraumatic.      Mouth/Throat:      Mouth: Mucous membranes are moist.   Eyes:      Extraocular Movements: Extraocular movements intact.      Conjunctiva/sclera: Conjunctivae normal.      Pupils: Pupils are equal, round, and reactive to light.   Cardiovascular:      Rate and Rhythm: Normal rate and regular rhythm.   Pulmonary:      Effort: Pulmonary effort is normal. No respiratory distress.      Breath sounds: Normal breath sounds. No wheezing.   Abdominal:      General: Abdomen is flat. Bowel sounds are normal. There is no distension.      Palpations: Abdomen is soft.      Tenderness: There is no abdominal tenderness.   Musculoskeletal:         General: No swelling or tenderness. Normal range of motion.      Cervical back: Normal range of motion and neck supple.   Skin:     General: Skin is warm.   Neurological:      Mental Status: She is alert and oriented to person, place, and time.      Comments: +ve facial droop. Intact motor function bilateral upper and lower extremities.    Psychiatric:         Mood and Affect: Mood normal.       Significant Labs: All pertinent labs within the past 24 hours have been reviewed.  CBC:   Recent Labs   Lab 02/16/23  0005 02/16/23  0415 02/16/23  2339   WBC 9.17 8.76 8.03   HGB 10.6* 10.7* 11.0*   HCT 35.4* 36.3* 36.2*    226 242     CMP:   Recent Labs   Lab 02/16/23  0415 02/16/23  2339    139   K 3.6 3.5    107   CO2 21* 22*   GLU 82 90   BUN 13 14   CREATININE 1.4 1.4   CALCIUM 8.6* 8.6*   ANIONGAP 7* 10       Significant Imaging: I have reviewed all pertinent imaging results/findings within the past 24 hours.      Assessment/Plan:      * Displacement of nephrostomy tube  57 yo F with BL ureteral obstruction 2/2 radiation cystitis s/p urostomy + BL nephrostomy tube placement admitted with L nephrostomy tube  dislodgement. Afebrile, hypotensive (90s/50s). Satting well on RA. Initial labs notable for improved renal function. IR consulted with plan for replacement of nephrostomy tube, tentatively on 2/14.    IR is consulted.S/P Left nephrostomy tube replacement and exchange of right nephrostomy tube.receiving empiric IV Abx and started on IVF for mild increased Cr.    Sepsis  2/2 complicated uti.   Urine cx grew klebsielle that is resistant to ceftriaxone.  Will stop ceftriaxone and linezolid. Start cipro 500 mg bid.   sepsis resolved.       Complicated UTI (urinary tract infection)  Urine cx grew klebsiella that is resistant to ceftriaxone.  Will stop ceftriaxone and linezolid. Start cipro 500 mg bid.         Acute renal failure superimposed on stage 3 chronic kidney disease  Stared on IVF.  Improved.  Continue to monitor.     Migraine without aura and without status migrainosus, not intractable  Pt was prescribed toradol 15 mg, 10 mg of compazine, 25 mg of benadryl, 500 cc of NS bolus, 1000 mg of tylenol and 2 grams of mag.        CKD (chronic kidney disease), stage III  Will monitor.      Nephrostomy status  Will monitor,     Presence of urostomy  H/o cervical cancer s/p HYST/chemo/XRT  Complicated by radiation cystitis s/p colon conduit  Developed bowel perforation from colon conduit anastamosis s/p hemicolectomy + ileostomy  S/p urostomy + BL nephrostomy tubes    Ileostomy in place  Will monitor.     Bilateral ureteral obstruction  IR is consulted.S/P Left nephrostomy tube replacement and exchange of right nephrostomy tube.    Radiation cystitis       Cervical cancer          VTE Risk Mitigation (From admission, onward)         Ordered     Reason for No Pharmacological VTE Prophylaxis  Once        Question:  Reasons:  Answer:  Physician Provided (leave comment)  Comment:  procedure    02/13/23 6789     IP VTE HIGH RISK PATIENT  Once         02/13/23 9671     Place sequential compression device  Until discontinued          02/13/23 2315     Place sequential compression device  Until discontinued         02/13/23 2236                Discharge Planning   OK: 2/19/2023     Code Status: Full Code   Is the patient medically ready for discharge?:     Reason for patient still in hospital (select all that apply): Patient trending condition  Discharge Plan A: Home with family                  Fransisca Martinez MD  Department of Hospital Medicine   [facility] - Surgery

## 2023-02-17 NOTE — SUBJECTIVE & OBJECTIVE
Interval History: pt has severe migraine headache, 10/10, frontal and doesn't radiate.     Review of Systems   Constitutional:  Negative for chills and fever.   HENT:  Negative for sore throat.    Respiratory:  Negative for cough and shortness of breath.    Cardiovascular:  Negative for chest pain and palpitations.   Gastrointestinal:  Negative for abdominal pain, nausea and vomiting.   Endocrine: Negative for cold intolerance and heat intolerance.   Genitourinary:  Negative for dysuria, frequency and urgency.   Neurological:  Positive for headaches. Negative for seizures and syncope.   Psychiatric/Behavioral:  Negative for confusion.    Objective:     Vital Signs (Most Recent):  Temp: 98.4 °F (36.9 °C) (02/17/23 1120)  Pulse: 82 (02/17/23 1120)  Resp: 18 (02/17/23 1120)  BP: 122/66 (02/17/23 1120)  SpO2: (!) 94 % (02/17/23 1120)   Vital Signs (24h Range):  Temp:  [98.1 °F (36.7 °C)-98.5 °F (36.9 °C)] 98.4 °F (36.9 °C)  Pulse:  [75-88] 82  Resp:  [16-18] 18  SpO2:  [94 %-97 %] 94 %  BP: (107-129)/(55-66) 122/66     Weight: 85.7 kg (189 lb)  Body mass index is 27.91 kg/m².    Intake/Output Summary (Last 24 hours) at 2/17/2023 1455  Last data filed at 2/17/2023 1120  Gross per 24 hour   Intake --   Output 2500 ml   Net -2500 ml      Physical Exam  Vitals reviewed.   Constitutional:       Appearance: Normal appearance.   HENT:      Head: Normocephalic and atraumatic.      Mouth/Throat:      Mouth: Mucous membranes are moist.   Eyes:      Extraocular Movements: Extraocular movements intact.      Conjunctiva/sclera: Conjunctivae normal.      Pupils: Pupils are equal, round, and reactive to light.   Cardiovascular:      Rate and Rhythm: Normal rate and regular rhythm.   Pulmonary:      Effort: Pulmonary effort is normal. No respiratory distress.      Breath sounds: Normal breath sounds. No wheezing.   Abdominal:      General: Abdomen is flat. Bowel sounds are normal. There is no distension.      Palpations: Abdomen is  soft.      Tenderness: There is no abdominal tenderness.   Musculoskeletal:         General: No swelling or tenderness. Normal range of motion.      Cervical back: Normal range of motion and neck supple.   Skin:     General: Skin is warm.   Neurological:      Mental Status: She is alert and oriented to person, place, and time.      Comments: +ve facial droop. Intact motor function bilateral upper and lower extremities.    Psychiatric:         Mood and Affect: Mood normal.       Significant Labs: All pertinent labs within the past 24 hours have been reviewed.  CBC:   Recent Labs   Lab 02/16/23  0005 02/16/23  0415 02/16/23  2339   WBC 9.17 8.76 8.03   HGB 10.6* 10.7* 11.0*   HCT 35.4* 36.3* 36.2*    226 242     CMP:   Recent Labs   Lab 02/16/23  0415 02/16/23  2339    139   K 3.6 3.5    107   CO2 21* 22*   GLU 82 90   BUN 13 14   CREATININE 1.4 1.4   CALCIUM 8.6* 8.6*   ANIONGAP 7* 10       Significant Imaging: I have reviewed all pertinent imaging results/findings within the past 24 hours.

## 2023-02-17 NOTE — ASSESSMENT & PLAN NOTE
Urine cx grew klebsiella that is resistant to ceftriaxone.  Will stop ceftriaxone and linezolid. Start cipro 500 mg bid.

## 2023-02-18 VITALS
RESPIRATION RATE: 18 BRPM | OXYGEN SATURATION: 96 % | HEART RATE: 76 BPM | HEIGHT: 69 IN | TEMPERATURE: 98 F | SYSTOLIC BLOOD PRESSURE: 119 MMHG | DIASTOLIC BLOOD PRESSURE: 60 MMHG | BODY MASS INDEX: 27.99 KG/M2 | WEIGHT: 189 LBS

## 2023-02-18 PROBLEM — N18.30 ACUTE RENAL FAILURE SUPERIMPOSED ON STAGE 3 CHRONIC KIDNEY DISEASE: Status: RESOLVED | Noted: 2023-02-14 | Resolved: 2023-02-18

## 2023-02-18 PROBLEM — N17.9 ACUTE RENAL FAILURE SUPERIMPOSED ON STAGE 3 CHRONIC KIDNEY DISEASE: Status: RESOLVED | Noted: 2023-02-14 | Resolved: 2023-02-18

## 2023-02-18 PROBLEM — T83.022A DISPLACEMENT OF NEPHROSTOMY TUBE: Status: RESOLVED | Noted: 2020-08-12 | Resolved: 2023-02-18

## 2023-02-18 PROBLEM — A41.9 SEPSIS: Status: RESOLVED | Noted: 2023-02-15 | Resolved: 2023-02-18

## 2023-02-18 LAB
ANION GAP SERPL CALC-SCNC: 7 MMOL/L (ref 8–16)
BASOPHILS # BLD AUTO: 0.04 K/UL (ref 0–0.2)
BASOPHILS NFR BLD: 0.7 % (ref 0–1.9)
BUN SERPL-MCNC: 8 MG/DL (ref 6–20)
CALCIUM SERPL-MCNC: 8.7 MG/DL (ref 8.7–10.5)
CHLORIDE SERPL-SCNC: 107 MMOL/L (ref 95–110)
CO2 SERPL-SCNC: 26 MMOL/L (ref 23–29)
CREAT SERPL-MCNC: 1 MG/DL (ref 0.5–1.4)
DIFFERENTIAL METHOD: ABNORMAL
EOSINOPHIL # BLD AUTO: 0.3 K/UL (ref 0–0.5)
EOSINOPHIL NFR BLD: 4.3 % (ref 0–8)
ERYTHROCYTE [DISTWIDTH] IN BLOOD BY AUTOMATED COUNT: 14.2 % (ref 11.5–14.5)
EST. GFR  (NO RACE VARIABLE): >60 ML/MIN/1.73 M^2
GLUCOSE SERPL-MCNC: 89 MG/DL (ref 70–110)
HCT VFR BLD AUTO: 38.4 % (ref 37–48.5)
HGB BLD-MCNC: 11.8 G/DL (ref 12–16)
IMM GRANULOCYTES # BLD AUTO: 0.01 K/UL (ref 0–0.04)
IMM GRANULOCYTES NFR BLD AUTO: 0.2 % (ref 0–0.5)
LYMPHOCYTES # BLD AUTO: 1.3 K/UL (ref 1–4.8)
LYMPHOCYTES NFR BLD: 22.4 % (ref 18–48)
MCH RBC QN AUTO: 27 PG (ref 27–31)
MCHC RBC AUTO-ENTMCNC: 30.7 G/DL (ref 32–36)
MCV RBC AUTO: 88 FL (ref 82–98)
MONOCYTES # BLD AUTO: 0.7 K/UL (ref 0.3–1)
MONOCYTES NFR BLD: 11.2 % (ref 4–15)
NEUTROPHILS # BLD AUTO: 3.6 K/UL (ref 1.8–7.7)
NEUTROPHILS NFR BLD: 61.2 % (ref 38–73)
NRBC BLD-RTO: 0 /100 WBC
PLATELET # BLD AUTO: 251 K/UL (ref 150–450)
PMV BLD AUTO: 9.3 FL (ref 9.2–12.9)
POTASSIUM SERPL-SCNC: 3.6 MMOL/L (ref 3.5–5.1)
RBC # BLD AUTO: 4.37 M/UL (ref 4–5.4)
SODIUM SERPL-SCNC: 140 MMOL/L (ref 136–145)
WBC # BLD AUTO: 5.81 K/UL (ref 3.9–12.7)

## 2023-02-18 PROCEDURE — 80048 BASIC METABOLIC PNL TOTAL CA: CPT | Performed by: HOSPITALIST

## 2023-02-18 PROCEDURE — 99239 HOSP IP/OBS DSCHRG MGMT >30: CPT | Mod: ,,, | Performed by: HOSPITALIST

## 2023-02-18 PROCEDURE — 36415 COLL VENOUS BLD VENIPUNCTURE: CPT | Performed by: HOSPITALIST

## 2023-02-18 PROCEDURE — 25000003 PHARM REV CODE 250: Performed by: HOSPITALIST

## 2023-02-18 PROCEDURE — 63600175 PHARM REV CODE 636 W HCPCS: Performed by: HOSPITALIST

## 2023-02-18 PROCEDURE — 85025 COMPLETE CBC W/AUTO DIFF WBC: CPT | Performed by: HOSPITALIST

## 2023-02-18 PROCEDURE — 25000003 PHARM REV CODE 250: Performed by: STUDENT IN AN ORGANIZED HEALTH CARE EDUCATION/TRAINING PROGRAM

## 2023-02-18 PROCEDURE — 99239 PR HOSPITAL DISCHARGE DAY,>30 MIN: ICD-10-PCS | Mod: ,,, | Performed by: HOSPITALIST

## 2023-02-18 RX ORDER — ACETAMINOPHEN 500 MG
1000 TABLET ORAL EVERY 8 HOURS PRN
Qty: 90 TABLET | Refills: 0 | Status: SHIPPED | OUTPATIENT
Start: 2023-02-18 | End: 2023-03-05

## 2023-02-18 RX ORDER — MAGNESIUM SULFATE HEPTAHYDRATE 40 MG/ML
2 INJECTION, SOLUTION INTRAVENOUS ONCE
Status: COMPLETED | OUTPATIENT
Start: 2023-02-18 | End: 2023-02-18

## 2023-02-18 RX ORDER — CIPROFLOXACIN 500 MG/1
500 TABLET ORAL EVERY 12 HOURS
Qty: 12 TABLET | Refills: 0 | Status: SHIPPED | OUTPATIENT
Start: 2023-02-18 | End: 2023-02-24

## 2023-02-18 RX ORDER — PROCHLORPERAZINE EDISYLATE 5 MG/ML
10 INJECTION INTRAMUSCULAR; INTRAVENOUS ONCE
Status: COMPLETED | OUTPATIENT
Start: 2023-02-18 | End: 2023-02-18

## 2023-02-18 RX ORDER — KETOROLAC TROMETHAMINE 30 MG/ML
30 INJECTION, SOLUTION INTRAMUSCULAR; INTRAVENOUS ONCE
Status: COMPLETED | OUTPATIENT
Start: 2023-02-18 | End: 2023-02-18

## 2023-02-18 RX ADMIN — POTASSIUM & SODIUM PHOSPHATES POWDER PACK 280-160-250 MG 1 PACKET: 280-160-250 PACK at 11:02

## 2023-02-18 RX ADMIN — POTASSIUM & SODIUM PHOSPHATES POWDER PACK 280-160-250 MG 1 PACKET: 280-160-250 PACK at 06:02

## 2023-02-18 RX ADMIN — MAGNESIUM SULFATE 2 G: 2 INJECTION INTRAVENOUS at 12:02

## 2023-02-18 RX ADMIN — METHYLPREDNISOLONE SODIUM SUCCINATE 125 MG: 40 INJECTION, POWDER, FOR SOLUTION INTRAMUSCULAR; INTRAVENOUS at 11:02

## 2023-02-18 RX ADMIN — KETOROLAC TROMETHAMINE 30 MG: 30 INJECTION, SOLUTION INTRAMUSCULAR; INTRAVENOUS at 11:02

## 2023-02-18 RX ADMIN — SODIUM BICARBONATE 1300 MG: 650 TABLET ORAL at 08:02

## 2023-02-18 RX ADMIN — SODIUM CHLORIDE: 4.5 INJECTION, SOLUTION INTRAVENOUS at 03:02

## 2023-02-18 RX ADMIN — PROCHLORPERAZINE EDISYLATE 10 MG: 5 INJECTION INTRAMUSCULAR; INTRAVENOUS at 11:02

## 2023-02-18 RX ADMIN — MAGNESIUM OXIDE TAB 400 MG (241.3 MG ELEMENTAL MG) 400 MG: 400 (241.3 MG) TAB at 09:02

## 2023-02-18 RX ADMIN — CIPROFLOXACIN 500 MG: 500 TABLET, FILM COATED ORAL at 08:02

## 2023-02-18 NOTE — PLAN OF CARE
Problem: Adult Inpatient Plan of Care  Goal: Plan of Care Review  Outcome: Ongoing, Progressing  Goal: Patient-Specific Goal (Individualized)  Outcome: Ongoing, Progressing  Goal: Absence of Hospital-Acquired Illness or Injury  Outcome: Ongoing, Progressing  Goal: Optimal Comfort and Wellbeing  Outcome: Ongoing, Progressing  Goal: Readiness for Transition of Care  Outcome: Ongoing, Progressing     Problem: Adult Inpatient Plan of Care  Goal: Plan of Care Review  Outcome: Ongoing, Progressing     Problem: Adult Inpatient Plan of Care  Goal: Patient-Specific Goal (Individualized)  Outcome: Ongoing, Progressing     Problem: Adult Inpatient Plan of Care  Goal: Absence of Hospital-Acquired Illness or Injury  Outcome: Ongoing, Progressing     Problem: Adult Inpatient Plan of Care  Goal: Optimal Comfort and Wellbeing  Outcome: Ongoing, Progressing     Problem: Fluid and Electrolyte Imbalance (Acute Kidney Injury/Impairment)  Goal: Fluid and Electrolyte Balance  Outcome: Ongoing, Progressing     Problem: Oral Intake Inadequate (Acute Kidney Injury/Impairment)  Goal: Optimal Nutrition Intake  Outcome: Ongoing, Progressing  Remain SR on telemetry monitor. PRN medication effective . Explained plan of care, verbalized understanding. Educated pt .on new medicine . No injury during shift, Side rails up x 2, call light by bedside.  Walker at bedside

## 2023-02-18 NOTE — NURSING
Patient's IV has been removed. Patient ileostomy bag was changed twice before discharge due to leakage. No acute distress noted. Patient ambulated to the exit accompanied by .

## 2023-02-19 LAB
BACTERIA BLD CULT: NORMAL
BACTERIA BLD CULT: NORMAL

## 2023-02-19 NOTE — DISCHARGE SUMMARY
[facility]  Hospital Medicine  Discharge Summary      Patient Name: Edita Riley  MRN: [id number]  LUIZ: [id number]  Patient Class: IP- Inpatient  Admission Date: 2/13/2023  Hospital Length of Stay: 3 days  Discharge Date and Time:  02/18/2023 8:06 PM  Attending Physician: Delma att. providers found   Discharging Provider: Fransisca Martinez MD  Primary Care Provider: Primary Doctor Delma  [facility] Medicine Team: [facility] HOSP MED  Fransisca Martinez MD  Primary Care Team: [facility]    HPI:   Ms. Riley is a 59 yo F with cervical cancer s/p HYST/chemo/XRT, BL ureteral obstruction 2/2 radiation cystitis s/p colon conduit (ultimately required BL nephrostomy tube placement, h/o bowel perforation from colon conduit anastomosis s/p hemicolectomy + ileostomy creation that presents with dislodged nephrostomy tubes.    Patient was previously well until earlier today when she bent over and felt her L nephrostomy tube dislodge. She has some mild pain around the site but otherwise no new complaints. Denies fevers/chills, cough/congestion, nausea/vomiting, changes in output.    Tobacco use: denies  EtOH use: denies  Illicit drug use: denies  Functional status: completes ADLs independently  Baseline mental status: AAOx3  Living situation: lives with daughter and     ED Course:  Afebrile, hypotensive (90s/50s). Satting well on RA. Initial labs notable for improved renal function. IR consulted with plan for replacement of nephrostomy tube, tentatively on 2/14.      * No surgery found *      Hospital Course:   Pt was noted to be in renal failure on admission and IV fluids were started.     IR is consulted. Pt underwent Successful exchange of bilateral nephrostomy tubes on 2/14.     Patient became septic after nephrostomy exchange with fever 101.8, and tachycardia. UA was positive. Urine cx and blood cx were collected. She was started on broad spectrum abx with ceftrixone and linezolid. Her sepsis eventually resolved.  Urine cx grew klebsiella that is resistent to ceftriaxone. Ceftriaxone and linezolid were discontinued and she was started on cipro instead.     Patient kidney failure resolved.  Her sepsis resolved.  She will be discharged on 6 more days of cipro to finish 7 days course of abx.     Was seen by PT OT who recommended home with no home care.  Patient agrees that she does not need any home care outpatient.  She however complain of almost daily migraine headache.  She was treated for migraine during this hospitalization and will be referred to Neurology outpt for her migraine headache.  Patient also has an appointment with urology coming up for her  problems.      Physical exam;  Vitals; reviewed  General; no acute distress  HENT: PERRLA, NC/AT  CV; RRR  Resp; CTA bilateral lung fields.   Abdomen; soft, + bowel sounds.     Discharge diagnosis:  1) Displacement of nephrostomy tube  2) Sepsis  3) Complicated UTI (urinary tract infection)  4) Acute renal failure    5) Migraine without aura and without status migrainosus, not intractable  Presence of urostomy  Ileostomy in place   Bilateral ureteral obstruction  Radiation cystitis  Cervical cancer       Goals of Care Treatment Preferences:  Code Status: Full Code      Consults:   Consults (From admission, onward)        Status Ordering Provider     Inpatient consult to Interventional Radiology  Once        Provider:  (Not yet assigned)    Completed NATHANIEL ZAPATA            Final Active Diagnoses:    Diagnosis Date Noted POA    Complicated UTI (urinary tract infection) [N39.0] 06/17/2021 Yes    Migraine without aura and without status migrainosus, not intractable [G43.009] 02/17/2023 No    CKD (chronic kidney disease), stage III [N18.30] 02/14/2023 Yes    Nephrostomy status [Z93.6]  Not Applicable     Chronic    Presence of urostomy [Z93.6] 02/08/2021 Not Applicable    Ileostomy in place [Z93.2]  Not Applicable     Chronic    Bilateral ureteral obstruction  [N13.5] 09/11/2020 Yes     Chronic    Radiation cystitis [N30.40] 09/04/2020 Yes     Chronic    Anemia due to chronic kidney disease [N18.9, D63.1] 05/18/2020 Yes    History of cervical cancer [Z85.41] 10/11/2018 Not Applicable     Chronic    Cervical cancer [C53.9] 05/04/2015 Yes      Problems Resolved During this Admission:    Diagnosis Date Noted Date Resolved POA    PRINCIPAL PROBLEM:  Displacement of nephrostomy tube [T83.022A] 08/12/2020 02/18/2023 Yes    Sepsis [A41.9] 02/15/2023 02/18/2023 Yes    Acute renal failure superimposed on stage 3 chronic kidney disease [N17.9, N18.30] 02/14/2023 02/18/2023 Yes    Acute cystitis with hematuria [N30.01] 02/17/2023 02/17/2023 Yes    Urinary tract infection without hematuria [N39.0] 02/16/2023 02/17/2023 No       Discharged Condition: stable    Disposition: Home or Self Care    Follow Up:   Follow-up Information     Primary Doctor No. Schedule an appointment as soon as possible for a visit in 1 week(s).                     Patient Instructions:      Ambulatory referral/consult to Neurology   Standing Status: Future   Referral Priority: Routine Referral Type: Consultation   Referral Reason: Specialty Services Required   Requested Specialty: Neurology   Number of Visits Requested: 1       Significant Diagnostic Studies: Labs:   BMP:   Recent Labs   Lab 02/16/23 2339 02/18/23 0823   GLU 90 89    140   K 3.5 3.6    107   CO2 22* 26   BUN 14 8   CREATININE 1.4 1.0   CALCIUM 8.6* 8.7   , CMP   Recent Labs   Lab 02/16/23 2339 02/18/23 0823    140   K 3.5 3.6    107   CO2 22* 26   GLU 90 89   BUN 14 8   CREATININE 1.4 1.0   CALCIUM 8.6* 8.7   ANIONGAP 10 7*    and CBC   Recent Labs   Lab 02/16/23 2339 02/18/23 0823   WBC 8.03 5.81   HGB 11.0* 11.8*   HCT 36.2* 38.4    251     Microbiology:   Urine Culture    Lab Results   Component Value Date    LABURIN KLEBSIELLA PNEUMONIAE ESBL  >100,000 cfu/ml   (A) 02/14/2023       Pending  Diagnostic Studies:     None         Medications:  Reconciled Home Medications:      Medication List      START taking these medications    ciprofloxacin HCl 500 MG tablet  Commonly known as: CIPRO  Take 1 tablet (500 mg total) by mouth every 12 (twelve) hours. for 6 days        CHANGE how you take these medications    acetaminophen 500 MG tablet  Commonly known as: TYLENOL  Take 2 tablets (1,000 mg total) by mouth every 8 (eight) hours as needed for Pain.  What changed:   · medication strength  · how much to take  · when to take this        CONTINUE taking these medications    gabapentin 100 MG capsule  Commonly known as: NEURONTIN  Take 2 capsules (200 mg total) by mouth every evening.     ketorolac 10 mg tablet  Commonly known as: TORADOL  Take 1 tablet (10 mg total) by mouth every 6 (six) hours as needed for Pain.     melatonin 3 mg tablet  Commonly known as: MELATIN  Take 2 tablets (6 mg total) by mouth nightly as needed for Insomnia.     mirtazapine 15 MG disintegrating tablet  Commonly known as: REMERON SOL-TAB  Dissolve 1 tablet (15 mg total) by mouth nightly.     sodium bicarbonate 650 MG tablet  Take 2 tablets (1,300 mg total) by mouth 2 (two) times daily.     sucralfate 1 gram tablet  Commonly known as: CARAFATE  Take 1 tablet (1 g total) by mouth 4 (four) times daily before meals and nightly.        STOP taking these medications    traMADoL 50 mg tablet  Commonly known as: ULTRAM            Indwelling Lines/Drains at time of discharge:   Lines/Drains/Airways     Drain  Duration                Urostomy 09/11/20 0000 ileal conduit  days         Ileostomy 09/18/20  days         Nephrostomy 02/14/23 0914 Left 12 Fr. 4 days         Nephrostomy 02/14/23 0918 Right 12 Fr. 4 days                Time spent on the discharge of patient: 40 minutes         Fransisca Martinez MD  Department of Hospital Medicine  [facility] - Surgery

## 2023-02-20 NOTE — PHYSICIAN QUERY
PT Name: Edita Riley  MR #: [id number]     DOCUMENTATION CLARIFICATION      CDS/: ISABELLE Pelletier, RN, CDS               Contact information:[email or web address]   This form is a permanent document in the medical record.     Query Date: February 20, 2023    By submitting this query, we are merely seeking further clarification of documentation to reflect the severity of illness of your patient. Please utilize your independent clinical judgment when addressing the question(s) below.    The Medical Record contains the following:     Indicators   Supporting Clinical Findings Location in Medical Record   X Documentation of condition  Urinary tract infection without hematuria    Dr. Larry TAPIA, 2/16   X Urinary Device, Catheter  Patient started be septic after nephrostomy exchange      R nephrostomy in place with drainage of clear yellow fluid  L nephrostomy site open with scant drainage of yellow fluid, no nephrostomy tube in place     cervical cancer s/p HYST/chemo/XRT, BL ureteral obstruction 2/2 radiation cystitis s/p colon conduit (ultimately required BL nephrostomy tube placement, h/o bowel perforation from colon conduit anastomosis s/p hemicolectomy + ileostomy creation that presents with dislodged nephrostomy tubes.     Patient was previously well until earlier today when she bent over and felt her L nephrostomy tube dislodge    Dr. Larry TAPIA, 2/16    Lab Value(s)     X UA Results  02/14/23 17:40   Specimen UA Urine, Clean Catch   Color, UA Yellow   Appearance, UA Ex.Turbid   Specific Gravity, UA 1.015   pH, UA 8.0   Protein, UA 3+    Glucose, UA Negative   Ketones, UA Negative   Occult Blood UA 2+    NITRITE UA Negative   Bilirubin (UA) Negative   Leukocytes, UA 3+    RBC, UA 33 (H)   WBC, UA >100 (H)   Bacteria, UA Many        Lab 2/14   X Cultures  Urine culture: Klebsiella pneumoniae esbl    Lab 2/14   X Treatment/Medication  IR is consulted.S/P Left nephrostomy tube replacement  and exchange of right nephrostomy tube.received empiric IV Abx and started on IVF    HM, Dr. Juarez, 2/16    Other        Provider, please clarify if there is any clinical correlation between UTI and Nephrostomy.    [  x ] Due to or associated with each other   [   ] Unrelated to each other   [   ] Other explanation (please specify): _____________   [   ] Clinically undetermined       Please document in your progress notes daily for the duration of treatment until resolved, and include in your discharge summary.    Form No. [id number]

## 2023-03-01 ENCOUNTER — HOSPITAL ENCOUNTER (OUTPATIENT)
Facility: HOSPITAL | Age: 58
Discharge: HOME | End: 2023-03-02
Attending: EMERGENCY MEDICINE | Admitting: UROLOGY
Payer: MEDICAID

## 2023-03-01 ENCOUNTER — OFFICE VISIT (OUTPATIENT)
Dept: WOUND CARE | Facility: CLINIC | Age: 58
End: 2023-03-01
Payer: MEDICAID

## 2023-03-01 ENCOUNTER — OFFICE VISIT (OUTPATIENT)
Dept: UROLOGY | Facility: CLINIC | Age: 58
End: 2023-03-01
Payer: MEDICAID

## 2023-03-01 VITALS
BODY MASS INDEX: 27.43 KG/M2 | WEIGHT: 185.19 LBS | HEART RATE: 93 BPM | SYSTOLIC BLOOD PRESSURE: 117 MMHG | HEIGHT: 69 IN | DIASTOLIC BLOOD PRESSURE: 71 MMHG

## 2023-03-01 DIAGNOSIS — Z93.6 NEPHROSTOMY STATUS: ICD-10-CM

## 2023-03-01 DIAGNOSIS — Z43.6 ATTENTION TO UROSTOMY: ICD-10-CM

## 2023-03-01 DIAGNOSIS — Z93.6 PRESENCE OF UROSTOMY: ICD-10-CM

## 2023-03-01 DIAGNOSIS — N99.528 NEPHROSTOMY COMPLICATION: ICD-10-CM

## 2023-03-01 DIAGNOSIS — K94.19 IRRITANT CONTACT DERMATITIS DUE TO ILEOSTOMY: ICD-10-CM

## 2023-03-01 DIAGNOSIS — N20.1 RIGHT URETERAL CALCULUS: Primary | ICD-10-CM

## 2023-03-01 DIAGNOSIS — Z43.2 ATTENTION TO ILEOSTOMY: Primary | ICD-10-CM

## 2023-03-01 DIAGNOSIS — L24.9 IRRITANT CONTACT DERMATITIS DUE TO ILEOSTOMY: ICD-10-CM

## 2023-03-01 DIAGNOSIS — T83.022A NEPHROSTOMY TUBE DISPLACED: Primary | ICD-10-CM

## 2023-03-01 LAB
ALBUMIN SERPL BCP-MCNC: 3.4 G/DL (ref 3.5–5.2)
ALP SERPL-CCNC: 104 U/L (ref 55–135)
ALT SERPL W/O P-5'-P-CCNC: 9 U/L (ref 10–44)
ANION GAP SERPL CALC-SCNC: 6 MMOL/L (ref 8–16)
AST SERPL-CCNC: 13 U/L (ref 10–40)
BASOPHILS # BLD AUTO: 0.04 K/UL (ref 0–0.2)
BASOPHILS NFR BLD: 0.4 % (ref 0–1.9)
BILIRUB SERPL-MCNC: 0.5 MG/DL (ref 0.1–1)
BUN SERPL-MCNC: 18 MG/DL (ref 6–20)
CALCIUM SERPL-MCNC: 9.7 MG/DL (ref 8.7–10.5)
CHLORIDE SERPL-SCNC: 110 MMOL/L (ref 95–110)
CO2 SERPL-SCNC: 26 MMOL/L (ref 23–29)
CREAT SERPL-MCNC: 1.4 MG/DL (ref 0.5–1.4)
DIFFERENTIAL METHOD: ABNORMAL
EOSINOPHIL # BLD AUTO: 0.1 K/UL (ref 0–0.5)
EOSINOPHIL NFR BLD: 1.2 % (ref 0–8)
ERYTHROCYTE [DISTWIDTH] IN BLOOD BY AUTOMATED COUNT: 16.7 % (ref 11.5–14.5)
EST. GFR  (NO RACE VARIABLE): 43.1 ML/MIN/1.73 M^2
GLUCOSE SERPL-MCNC: 81 MG/DL (ref 70–110)
HCT VFR BLD AUTO: 41 % (ref 37–48.5)
HGB BLD-MCNC: 12.2 G/DL (ref 12–16)
IMM GRANULOCYTES # BLD AUTO: 0.03 K/UL (ref 0–0.04)
IMM GRANULOCYTES NFR BLD AUTO: 0.3 % (ref 0–0.5)
INR PPP: 1 (ref 0.8–1.2)
LYMPHOCYTES # BLD AUTO: 1.6 K/UL (ref 1–4.8)
LYMPHOCYTES NFR BLD: 14.3 % (ref 18–48)
MCH RBC QN AUTO: 27.3 PG (ref 27–31)
MCHC RBC AUTO-ENTMCNC: 29.8 G/DL (ref 32–36)
MCV RBC AUTO: 92 FL (ref 82–98)
MONOCYTES # BLD AUTO: 1.2 K/UL (ref 0.3–1)
MONOCYTES NFR BLD: 11 % (ref 4–15)
NEUTROPHILS # BLD AUTO: 8.2 K/UL (ref 1.8–7.7)
NEUTROPHILS NFR BLD: 72.8 % (ref 38–73)
NRBC BLD-RTO: 0 /100 WBC
PLATELET # BLD AUTO: 199 K/UL (ref 150–450)
PMV BLD AUTO: 11.1 FL (ref 9.2–12.9)
POTASSIUM SERPL-SCNC: 4.2 MMOL/L (ref 3.5–5.1)
PROT SERPL-MCNC: 7.2 G/DL (ref 6–8.4)
PROTHROMBIN TIME: 10.4 SEC (ref 9–12.5)
RBC # BLD AUTO: 4.47 M/UL (ref 4–5.4)
SODIUM SERPL-SCNC: 142 MMOL/L (ref 136–145)
WBC # BLD AUTO: 11.28 K/UL (ref 3.9–12.7)

## 2023-03-01 PROCEDURE — 1159F PR MEDICATION LIST DOCUMENTED IN MEDICAL RECORD: ICD-10-PCS | Mod: CPTII,,, | Performed by: CLINICAL NURSE SPECIALIST

## 2023-03-01 PROCEDURE — G0378 HOSPITAL OBSERVATION PER HR: HCPCS

## 2023-03-01 PROCEDURE — 3078F DIAST BP <80 MM HG: CPT | Mod: CPTII,CMP,, | Performed by: UROLOGY

## 2023-03-01 PROCEDURE — 1111F DSCHRG MED/CURRENT MED MERGE: CPT | Mod: CPTII,,, | Performed by: CLINICAL NURSE SPECIALIST

## 2023-03-01 PROCEDURE — 96361 HYDRATE IV INFUSION ADD-ON: CPT

## 2023-03-01 PROCEDURE — 1160F PR REVIEW ALL MEDS BY PRESCRIBER/CLIN PHARMACIST DOCUMENTED: ICD-10-PCS | Mod: CPTII,,, | Performed by: CLINICAL NURSE SPECIALIST

## 2023-03-01 PROCEDURE — 80053 COMPREHEN METABOLIC PANEL: CPT | Performed by: UROLOGY

## 2023-03-01 PROCEDURE — 63600175 PHARM REV CODE 636 W HCPCS

## 2023-03-01 PROCEDURE — 96374 THER/PROPH/DIAG INJ IV PUSH: CPT

## 2023-03-01 PROCEDURE — 3074F PR MOST RECENT SYSTOLIC BLOOD PRESSURE < 130 MM HG: ICD-10-PCS | Mod: CPTII,CMP,, | Performed by: UROLOGY

## 2023-03-01 PROCEDURE — 1111F PR DISCHARGE MEDS RECONCILED W/ CURRENT OUTPATIENT MED LIST: ICD-10-PCS | Mod: CPTII,,, | Performed by: CLINICAL NURSE SPECIALIST

## 2023-03-01 PROCEDURE — 63600175 PHARM REV CODE 636 W HCPCS: Performed by: STUDENT IN AN ORGANIZED HEALTH CARE EDUCATION/TRAINING PROGRAM

## 2023-03-01 PROCEDURE — 1111F PR DISCHARGE MEDS RECONCILED W/ CURRENT OUTPATIENT MED LIST: ICD-10-PCS | Mod: CPTII,CMP,, | Performed by: UROLOGY

## 2023-03-01 PROCEDURE — 85025 COMPLETE CBC W/AUTO DIFF WBC: CPT | Performed by: STUDENT IN AN ORGANIZED HEALTH CARE EDUCATION/TRAINING PROGRAM

## 2023-03-01 PROCEDURE — 99999 PR PBB SHADOW E&M-EST. PATIENT-LVL II: ICD-10-PCS | Mod: PBBFAC,,, | Performed by: CLINICAL NURSE SPECIALIST

## 2023-03-01 PROCEDURE — 99999 PR PBB SHADOW E&M-EST. PATIENT-LVL II: CPT | Mod: PBBFAC,,, | Performed by: CLINICAL NURSE SPECIALIST

## 2023-03-01 PROCEDURE — 1159F MED LIST DOCD IN RCRD: CPT | Mod: CPTII,,, | Performed by: CLINICAL NURSE SPECIALIST

## 2023-03-01 PROCEDURE — 99213 OFFICE O/P EST LOW 20 MIN: CPT | Mod: PBBFAC,27,25 | Performed by: UROLOGY

## 2023-03-01 PROCEDURE — 3078F PR MOST RECENT DIASTOLIC BLOOD PRESSURE < 80 MM HG: ICD-10-PCS | Mod: CPTII,CMP,, | Performed by: UROLOGY

## 2023-03-01 PROCEDURE — 99285 PR EMERGENCY DEPT VISIT,LEVEL V: ICD-10-PCS | Mod: ,,, | Performed by: EMERGENCY MEDICINE

## 2023-03-01 PROCEDURE — 85610 PROTHROMBIN TIME: CPT | Performed by: STUDENT IN AN ORGANIZED HEALTH CARE EDUCATION/TRAINING PROGRAM

## 2023-03-01 PROCEDURE — 99285 EMERGENCY DEPT VISIT HI MDM: CPT | Mod: 25,27

## 2023-03-01 PROCEDURE — 3074F SYST BP LT 130 MM HG: CPT | Mod: CPTII,CMP,, | Performed by: UROLOGY

## 2023-03-01 PROCEDURE — 99212 OFFICE O/P EST SF 10 MIN: CPT | Mod: PBBFAC,25 | Performed by: CLINICAL NURSE SPECIALIST

## 2023-03-01 PROCEDURE — 99285 EMERGENCY DEPT VISIT HI MDM: CPT | Mod: ,,, | Performed by: EMERGENCY MEDICINE

## 2023-03-01 PROCEDURE — 99214 OFFICE O/P EST MOD 30 MIN: CPT | Mod: S$PBB,CMP,, | Performed by: UROLOGY

## 2023-03-01 PROCEDURE — 99215 PR OFFICE/OUTPT VISIT, EST, LEVL V, 40-54 MIN: ICD-10-PCS | Mod: S$PBB,,, | Performed by: CLINICAL NURSE SPECIALIST

## 2023-03-01 PROCEDURE — 3008F BODY MASS INDEX DOCD: CPT | Mod: CPTII,CMP,, | Performed by: UROLOGY

## 2023-03-01 PROCEDURE — 99215 OFFICE O/P EST HI 40 MIN: CPT | Mod: S$PBB,,, | Performed by: CLINICAL NURSE SPECIALIST

## 2023-03-01 PROCEDURE — 1160F RVW MEDS BY RX/DR IN RCRD: CPT | Mod: CPTII,,, | Performed by: CLINICAL NURSE SPECIALIST

## 2023-03-01 PROCEDURE — 3008F PR BODY MASS INDEX (BMI) DOCUMENTED: ICD-10-PCS | Mod: CPTII,CMP,, | Performed by: UROLOGY

## 2023-03-01 PROCEDURE — 1111F DSCHRG MED/CURRENT MED MERGE: CPT | Mod: CPTII,CMP,, | Performed by: UROLOGY

## 2023-03-01 PROCEDURE — 99999 PR PBB SHADOW E&M-EST. PATIENT-LVL III: CPT | Mod: PBBFAC,,, | Performed by: UROLOGY

## 2023-03-01 PROCEDURE — 25000003 PHARM REV CODE 250

## 2023-03-01 PROCEDURE — 99999 PR PBB SHADOW E&M-EST. PATIENT-LVL III: ICD-10-PCS | Mod: PBBFAC,,, | Performed by: UROLOGY

## 2023-03-01 PROCEDURE — 99214 PR OFFICE/OUTPT VISIT, EST, LEVL IV, 30-39 MIN: ICD-10-PCS | Mod: S$PBB,CMP,, | Performed by: UROLOGY

## 2023-03-01 RX ORDER — MORPHINE SULFATE 4 MG/ML
4 INJECTION, SOLUTION INTRAMUSCULAR; INTRAVENOUS
Status: COMPLETED | OUTPATIENT
Start: 2023-03-01 | End: 2023-03-01

## 2023-03-01 RX ORDER — TALC
6 POWDER (GRAM) TOPICAL NIGHTLY PRN
Status: DISCONTINUED | OUTPATIENT
Start: 2023-03-01 | End: 2023-03-02 | Stop reason: HOSPADM

## 2023-03-01 RX ORDER — SODIUM CHLORIDE 0.9 % (FLUSH) 0.9 %
3 SYRINGE (ML) INJECTION
Status: DISCONTINUED | OUTPATIENT
Start: 2023-03-01 | End: 2023-03-02 | Stop reason: HOSPADM

## 2023-03-01 RX ORDER — ACETAMINOPHEN 500 MG
1000 TABLET ORAL EVERY 8 HOURS PRN
Status: DISCONTINUED | OUTPATIENT
Start: 2023-03-01 | End: 2023-03-02 | Stop reason: HOSPADM

## 2023-03-01 RX ORDER — ONDANSETRON 2 MG/ML
4 INJECTION INTRAMUSCULAR; INTRAVENOUS EVERY 6 HOURS PRN
Status: DISCONTINUED | OUTPATIENT
Start: 2023-03-01 | End: 2023-03-02 | Stop reason: HOSPADM

## 2023-03-01 RX ORDER — OXYCODONE HYDROCHLORIDE 10 MG/1
10 TABLET ORAL EVERY 4 HOURS PRN
Status: DISCONTINUED | OUTPATIENT
Start: 2023-03-01 | End: 2023-03-02 | Stop reason: HOSPADM

## 2023-03-01 RX ORDER — SODIUM CHLORIDE, SODIUM LACTATE, POTASSIUM CHLORIDE, CALCIUM CHLORIDE 600; 310; 30; 20 MG/100ML; MG/100ML; MG/100ML; MG/100ML
INJECTION, SOLUTION INTRAVENOUS CONTINUOUS
Status: DISCONTINUED | OUTPATIENT
Start: 2023-03-01 | End: 2023-03-02 | Stop reason: HOSPADM

## 2023-03-01 RX ORDER — GABAPENTIN 100 MG/1
200 CAPSULE ORAL NIGHTLY
Status: DISCONTINUED | OUTPATIENT
Start: 2023-03-01 | End: 2023-03-02 | Stop reason: HOSPADM

## 2023-03-01 RX ORDER — OXYCODONE HYDROCHLORIDE 5 MG/1
5 TABLET ORAL EVERY 4 HOURS PRN
Status: DISCONTINUED | OUTPATIENT
Start: 2023-03-01 | End: 2023-03-02 | Stop reason: HOSPADM

## 2023-03-01 RX ORDER — MIRTAZAPINE 15 MG/1
15 TABLET, ORALLY DISINTEGRATING ORAL NIGHTLY
Status: DISCONTINUED | OUTPATIENT
Start: 2023-03-01 | End: 2023-03-02 | Stop reason: HOSPADM

## 2023-03-01 RX ADMIN — GABAPENTIN 200 MG: 100 CAPSULE ORAL at 08:03

## 2023-03-01 RX ADMIN — MORPHINE SULFATE 4 MG: 4 INJECTION INTRAVENOUS at 07:03

## 2023-03-01 RX ADMIN — MIRTAZAPINE 15 MG: 15 TABLET, ORALLY DISINTEGRATING ORAL at 09:03

## 2023-03-01 RX ADMIN — SODIUM CHLORIDE, POTASSIUM CHLORIDE, SODIUM LACTATE AND CALCIUM CHLORIDE: 600; 310; 30; 20 INJECTION, SOLUTION INTRAVENOUS at 08:03

## 2023-03-01 NOTE — ED NOTES
..Patient identifiers for Edita Riley 58 y.o. female checked and correct.  Chief Complaint   Patient presents with    Flank Pain     Left sided flank pain, nephrotstomy tube came out today      Past Medical History:   Diagnosis Date    Abnormal mammogram 08/25/2020    Acute blood loss anemia     Acute deep vein thrombosis (DVT) of lower extremity 12/09/2020    Advance care planning 04/30/2021    Anemia due to chronic blood loss     Anemia due to chronic kidney disease     Anxiety     Cardiovascular event risk -- low 09/14/2015    ASCVD 10-year risk 1.9% (with optimal risk factors 1.3%) as of 9/14/2015     Cervical cancer 2014    Chronic back pain     Colostomy care     Deep vein thrombosis     Depression     Diarrhea due to malabsorption 11/14/2018    Diarrhea due to malabsorption 11/14/2018    Difficult intubation     Disorder of kidney and ureter     DVT of lower extremity, bilateral 11/04/2020    Fibromyalgia     Fungemia 09/27/2020    Generalized abdominal pain 08/25/2020    GERD (gastroesophageal reflux disease)     Hemifacial spasm 09/16/2015    Hiatal hernia 2014    History of cervical cancer 10/11/2018    Hx of psychiatric care     Cymbalta, trazodone    Hypertension     Hypomagnesemia 11/21/2018    Lactose intolerance     Metastatic squamous cell carcinoma to lymph node 10/11/2018    Nephrostomy tube displaced     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 drug 11/14/2018    Osteoarthritis of back     Peritonitis 09/22/2020    Pseudomonas urinary tract infection 04/21/2021    Psychiatric problem     Refusal of blood transfusions as patient is [place]     Estephanieki's ring 09/14/2015    Seen on outside EGD 05/2014, underwent esophageal dilatation. Bx were negative.     Seizures     Sleep stage dysfunction     Noted on PSG 06/2017; negative for obstructive sleep apnea     Stroke     Urinary tract infection associated with nephrostomy catheter 06/11/2020    Wound infection after surgery  09/24/2020     Allergies reported:   Review of patient's allergies indicates:   Allergen Reactions    Bee sting [allergen ext-venom-honey bee]      Rash      Grass pollen-bermuda, standard      rash         LOC: Patient is awake, alert, and aware of environment with an appropriate affect. Patient is oriented x 3 and speaking appropriately.  APPEARANCE: Patient resting comfortably and in no acute distress. Patient is clean and well groomed, patient's clothing is properly fastened.  HEENT: **AAO--Sent down from urology clinic p urostomy tube on the left side came out  SKIN: The skin is warm and dry. Patient has normal skin turgor and moist mucus membranes. Skin is intact; no bruising or breakdown noted.  MUSKULOSKELETAL: Patient is moving all extremities well, no obvious deformities noted. Pulses intact.   RESPIRATORY: Airway is open and patent. Respirations are spontaneous and non-labored with normal effort and rate, BBS=clear  CARDIAC: Patient has a normal rate and rhythm. ** on cardiac monitor,No peripheral edema noted.   ABDOMEN: stool comes out the right side into bag and urine comes out the left side into bag--c/o right sided left side.   NEUROLOGICAL: . Facial expression is symmetrical. Hand grasps are equal bilaterally. Normal sensation in all extremities when touched with finger.

## 2023-03-01 NOTE — ED PROVIDER NOTES
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  She denies fevers, chills, chest pain or shortness of breath.  Of note, IR recently replaced her left nephrostomy tube on 2/14/23.    Review of patient's allergies indicates:   Allergen Reactions    Bee sting [allergen ext-venom-honey bee]      Rash      Grass pollen-bermuda, standard      rash     Past Medical History:   Diagnosis Date    Abnormal mammogram 08/25/2020    Acute blood loss anemia     Acute deep vein thrombosis (DVT) of lower extremity 12/09/2020    Advance care planning 04/30/2021    Anemia due to chronic blood loss     Anemia due to chronic kidney disease     Anxiety     Bilateral ureteral obstruction 9/11/2020    Cardiovascular event risk -- low 09/14/2015    ASCVD 10-year risk 1.9% (with optimal risk factors 1.3%) as of 9/14/2015     Cervical cancer 2014    Chronic back pain     Colostomy care     Deep vein thrombosis     Depression     Diarrhea due to malabsorption 11/14/2018    Diarrhea due to malabsorption 11/14/2018    Difficult intubation     Disorder of kidney and ureter     DVT of lower extremity, bilateral 11/04/2020    Fibromyalgia     Fungemia 09/27/2020    Generalized abdominal pain 08/25/2020    GERD (gastroesophageal reflux disease)     Hemifacial spasm 09/16/2015    Hiatal hernia 2014    History of cervical cancer 10/11/2018    Hx of psychiatric care     Cymbalta, trazodone    Hypertension     Hypomagnesemia 11/21/2018    Lactose intolerance      Metastatic squamous cell carcinoma to lymph node 10/11/2018    Nephrostomy tube displaced     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 drug 11/14/2018    Osteoarthritis of back     Peritonitis 09/22/2020    Pseudomonas urinary tract infection 04/21/2021    Psychiatric problem     Refusal of blood transfusions as patient is [employer]     Schatzki's ring 09/14/2015    Seen on outside EGD 05/2014, underwent esophageal dilatation. Bx were negative.     Seizures     Sleep stage dysfunction     Noted on PSG 06/2017; negative for obstructive sleep apnea     Stroke     Urinary tract infection associated with nephrostomy catheter 06/11/2020    Wound infection after surgery 09/24/2020     Past Surgical History:   Procedure Laterality Date    ANTEGRADE NEPHROSTOGRAPHY Bilateral 9/28/2020    Procedure: Nephrostogram - antegrade;  Surgeon: Leslie Balbuena MD;  Location: [facility] OR 1ST FLR;  Service: Urology;  Laterality: Bilateral;    ANTEGRADE NEPHROSTOGRAPHY Left 4/20/2021    Procedure: NEPHROSTOGRAM, ANTEGRADE;  Surgeon: Leslie Balbuena MD;  Location: [facility] OR 1ST FLR;  Service: Urology;  Laterality: Left;    ANTEGRADE NEPHROSTOGRAPHY Right 10/27/2022    Procedure: NEPHROSTOGRAM, ANTEGRADE;  Surgeon: Chirag Russ MD;  Location: [facility] OR 1ST FLR;  Service: Urology;  Laterality: Right;    BILATERAL OOPHORECTOMY  2015    CHOLECYSTECTOMY  11/09/2016    Done at [facility], path showed chronic cholecystitis and gallstones    cold knife conization  2014    COLECTOMY, RIGHT  9/17/2020    Procedure: COLECTOMY, RIGHT Extended;  Surgeon: Hammad Reynolds MD;  Location: [facility] OR 2ND FLR;  Service: Colon and Rectal;;    COLONOSCOPY  2014    [facility] N[facility]    COLONOSCOPY N/A 5/18/2018    Procedure: COLONOSCOPY;  Surgeon: Arden Gutiérrez MD;  Location: [facility] (4TH FLR);  Service: Endoscopy;  Laterality: N/A;    COLONOSCOPY N/A 7/28/2020    Procedure: COLONOSCOPY;  Surgeon: Hammad Reynolds MD;   Location: [facility] ENDO (4TH FLR);  Service: Colon and Rectal;  Laterality: N/A;  covid test [place] 7/25    CYSTOSCOPY WITH URETEROSCOPY, RETROGRADE PYELOGRAPHY, AND INSERTION OF STENT Bilateral 3/21/2020    Procedure: CYSTOSCOPY, WITH RETROGRADE PYELOGRAM,;  Surgeon: Leslie Balbuena MD;  Location: [facility] OR 1ST FLR;  Service: Urology;  Laterality: Bilateral;    DILATION OF NEPHROSTOMY TRACT Right 10/27/2022    Procedure: DILATION, NEPHROSTOMY TRACT;  Surgeon: Chirag Russ MD;  Location: [facility] OR 1ST FLR;  Service: Urology;  Laterality: Right;    ESOPHAGOGASTRODUODENOSCOPY  2014    ESOPHAGOGASTRODUODENOSCOPY  11/18/2020    ESOPHAGOGASTRODUODENOSCOPY N/A 11/18/2020    Procedure: ESOPHAGOGASTRODUODENOSCOPY (EGD);  Surgeon: Zenon Spencer MD;  Location: [facility];  Service: Endoscopy;  Laterality: N/A;    ESOPHAGOGASTRODUODENOSCOPY N/A 12/11/2020    Procedure: EGD (ESOPHAGOGASTRODUODENOSCOPY);  Surgeon: Juancho Muse MD;  Location: [facility] ENDO (2ND FLR);  Service: Endoscopy;  Laterality: N/A;    HYSTERECTOMY  2014    [facility] for cervical cancer    ILEOSTOMY  9/17/2020    Procedure: CREATION, ILEOSTOMY;  Surgeon: Hammad Reynolds MD;  Location: [facility] OR 2ND FLR;  Service: Colon and Rectal;;    LOOPOGRAM N/A 4/20/2021    Procedure: LOOPOGRAM;  Surgeon: Leslie Balbuena MD;  Location: [facility] OR 1ST FLR;  Service: Urology;  Laterality: N/A;    MOBILIZATION OF SPLENIC FLEXURE N/A 9/11/2020    Procedure: MOBILIZATION, COLONIC;  Surgeon: Hammad Reynolds MD;  Location: [facility] OR 2ND FLR;  Service: Colon and Rectal;  Laterality: N/A;    NEPHROSTOGRAPHY Bilateral 4/17/2021    Procedure: Nephrostogram;  Surgeon: Celeste Surgeon;  Location: [facility] CELESTE;  Service: Anesthesiology;  Laterality: Bilateral;    OOPHORECTOMY      PYELOSCOPY Right 10/27/2022    Procedure: PYELOSCOPY;  Surgeon: Chirag Russ MD;  Location: [facility] OR 1ST FLR;  Service: Urology;  Laterality: Right;    REPLACEMENT OF NEPHROSTOMY TUBE Right 10/27/2022     Procedure: REPLACEMENT, NEPHROSTOMY TUBE;  Surgeon: Chirag Russ MD;  Location: [facility] OR 1ST FLR;  Service: Urology;  Laterality: Right;    RETROPERITONEAL LYMPHADENECTOMY Right 9/17/2018    Procedure: LYMPHADENECTOMY, RETROPERITONEUM;  Surgeon: Sebas Reed MD;  Location: [facility] OR 2ND FLR;  Service: General;  Laterality: Right;  ILIAC    URETEROSCOPIC REMOVAL OF URETERIC CALCULUS Right 10/27/2022    Procedure: REMOVAL, CALCULUS, URETER, URETEROSCOPIC;  Surgeon: Chirag Russ MD;  Location: [facility] OR 1ST FLR;  Service: Urology;  Laterality: Right;    URETEROSCOPY Right 10/27/2022    Procedure: URETEROSCOPY-ANTEGRADE;  Surgeon: Chirag Russ MD;  [facility];  Service: Urology;  Laterality: Right;     Family History   Problem Relation Age of Onset    Heart disease Sister     Heart disease Maternal Grandmother     Colon cancer Father     Esophageal cancer Father     Cancer Father         Lung-smoker    Cancer Mother         Cervical    Cervical cancer Mother     Breast cancer Maternal Aunt     Suicide Daughter         jumped from parking structure    Drug abuse Daughter     Drug abuse Daughter     Rectal cancer Neg Hx     Stomach cancer Neg Hx     Crohn's disease Neg Hx     Ulcerative colitis Neg Hx     Diabetes Neg Hx     Hypertension Neg Hx      Social History     Tobacco Use    Smoking status: Never    Smokeless tobacco: Never   Substance Use Topics    Alcohol use: No     Alcohol/week: 0.0 standard drinks    Drug use: No     Review of Systems   Constitutional:  Negative for chills and fever.   HENT:  Negative for congestion and rhinorrhea.    Eyes:  Negative for pain and visual disturbance.   Respiratory:  Negative for cough and shortness of breath.    Cardiovascular:  Negative for chest pain and palpitations.   Gastrointestinal:  Positive for abdominal pain (Chronic). Negative for vomiting.   Genitourinary:  Negative for hematuria.   Musculoskeletal:  Negative for gait problem and  joint swelling.   Skin:  Negative for rash and wound.   Neurological:  Negative for numbness and headaches.     Physical Exam     Initial Vitals [03/01/23 1543]   BP Pulse Resp Temp SpO2   97/62 74 18 97.9 °F (36.6 °C) 99 %      MAP       --         Physical Exam    Nursing note and vitals reviewed.  Constitutional: She is not diaphoretic. No distress.   HENT:   Head: Normocephalic and atraumatic.   Mouth/Throat: Oropharynx is clear and moist.   Eyes: Conjunctivae and EOM are normal.   Neck: Neck supple.   Normal range of motion.  Cardiovascular:  Normal rate, regular rhythm, normal heart sounds and intact distal pulses.           Pulmonary/Chest: Breath sounds normal. She has no wheezes. She has no rhonchi. She has no rales.   Abdominal: Abdomen is soft. She exhibits no distension. There is abdominal tenderness (Chronic surrounding right ostomy bag).   No erythema, fluctuance or induration surrounding bilateral ostomy bags   Musculoskeletal:         General: No edema. Normal range of motion.      Cervical back: Normal range of motion and neck supple.      Comments: Right nephrostomy tube in place.  Absent left nephrostomy tube with no drainage or erythema.     Neurological: She is alert and oriented to person, place, and time.   Skin: Skin is warm and dry. Capillary refill takes less than 2 seconds.       ED Course   Procedures  Labs Reviewed   CBC W/ AUTO DIFFERENTIAL - Abnormal; Notable for the following components:       Result Value    MCHC 29.8 (*)     RDW 16.7 (*)     Gran # (ANC) 8.2 (*)     Mono # 1.2 (*)     Lymph % 14.3 (*)     All other components within normal limits   COMPREHENSIVE METABOLIC PANEL - Abnormal; Notable for the following components:    Albumin 3.4 (*)     ALT 9 (*)     Anion Gap 6 (*)     eGFR 43.1 (*)     All other components within normal limits   PROTIME-INR          Imaging Results              X-Ray Abdomen AP 1 View (KUB) (Final result)  Result time 03/01/23 18:13:33      Final  result by Kem Waddell MD (03/01/23 18:13:33)                   Impression:      As above      Electronically signed by: eKm Waddell MD  Date:    03/01/2023  Time:    18:13               Narrative:    EXAMINATION:  XR ABDOMEN AP 1 VIEW    CLINICAL HISTORY:  Other complication of incontinent external stoma of urinary tract    TECHNIQUE:  AP View(s) of the abdomen was performed.    COMPARISON:  12/22/2020    FINDINGS:  Two views abdomen supine.    Right nephrostomy tube noted.  Surgical changes are noted of the abdomen and pelvis.  The bowel gas pattern is nonobstructive.  There are phleboliths in the pelvis.  There is surgical change of the bowel on the left.  No there is a questionable calcific density projected in the region of the right kidney although not confirmed.  The osseous structures are intact.                                       Medications   gabapentin capsule 200 mg (200 mg Oral Given 3/1/23 2039)   acetaminophen tablet 1,000 mg (has no administration in time range)   melatonin tablet 6 mg (has no administration in time range)   mirtazapine disintegrating tablet 15 mg (15 mg Oral Given 3/1/23 2147)   sodium chloride 0.9% flush 3 mL (has no administration in time range)   ondansetron injection 4 mg (has no administration in time range)   oxyCODONE immediate release tablet 5 mg (has no administration in time range)   oxyCODONE immediate release tablet Tab 10 mg (has no administration in time range)   lactated ringers infusion ( Intravenous New Bag 3/1/23 2044)   morphine injection 4 mg (4 mg Intravenous Given 3/1/23 1946)     Medical Decision Making:   History:   Old Medical Records: I decided to obtain old medical records.  Differential Diagnosis:   Nephrostomy displacement  Cellulitis  Urinary obstruction  Clinical Tests:   Lab Tests: Ordered and Reviewed  ED Management:  Patient is hemodynamically stable.  Morphine given for pain.  CBC, CMP and INR within normal limits.  Interventional  Radiology and Urology consulted.  Patient will be admitted to Urology Service.                        Clinical Impression:   Final diagnoses:  [N99.528] Nephrostomy complication        ED Disposition Condition    Observation                 Damari Lehman MD  Resident  03/01/23 7705

## 2023-03-01 NOTE — PROGRESS NOTES
Subjective:       Patient ID: Edita Riley is a 58 y.o. female.    Chief Complaint: Ileostomy, Urostomy, and Skin Problem    Pt here today as a same day add on due to out of supplies and her skin is scalded, she has just seen Dr Balbuena and while sitting in waiting area, she accidentally pulled out her left Nephrostomy tube . I notified Dr Balbuena    October visit with    visit one year after first meeting and today she is accompanied by her new  Carmen Sanchez,    she has an ileostomy and urostomy since 2020 . She is still having issues with her pouches leaking and she is making many visits to ED for supplies and having leaks daily.  Her  did not want to come and per pt he is trying to get her to go to a nursing home , as he is not really willing to help her with all her health issues.   She is having kidney stone surgery tomorrow with Dr Russ  and she also has bilateral nephrostomy tubes now as well set for exchange in IR next week       Review of Systems   Constitutional:  Negative for activity change and fever.   HENT: Negative.     Respiratory: Negative.     Cardiovascular: Negative.    Gastrointestinal:         Ileostomy leaks every day and supply issues    Genitourinary:         Bilateral neph tubes and urine bit cloudy    Integumentary:  Positive for rash. Negative for wound.       Objective:      Physical Exam  Constitutional:       Appearance: Normal appearance.   Pulmonary:      Effort: Pulmonary effort is normal. No respiratory distress.   Abdominal:      General: Abdomen is flat. Bowel sounds are normal.      Palpations: Abdomen is soft.   Musculoskeletal:         General: Normal range of motion.   Skin:     Findings: Rash present.          She had a pouch with NO wafer to attach sittingover her ileostomy, stool everywhere      Her uro pch hanging on with those pins in photo ???!!!!  I revwed her supply situation and they are due to arrive next week ,   I applied  "clean pouches on both and gave her what I could to help her out   Applied new image and told her to belt it when she got home   1" soft convex pch [place] and belted snuggly she understands she must empty early and not let it get full     Changed her uro pouch too, told her NOT to put pins through the pouch !!!!!!!!!!!!!!    Changed dressings on right side neph tube    Left is out and to be addressed by Dr Balbuena    ______________________________________________________________________________________________________________________________________  2021       2022     2021      Oct 2022  Ileostomy right SIDE STOMA IS  SLIGHTLY OVAL AT 3 OCLOCK SIDE AND PT MUST ROUND OUT HER STOMA FOR POUCH PLACING   GAVE HER DUNIAISER NEW IMAGE PRECUT 1" TODAY TO TRY  AND DELT       2021       Oct 2022  GAVE HER CONVEX COLOPLAST TODAY (10)   She is having trouble with 180 and not getting adequate supplies and is very unhappy .    I WILL SWITCH HER TO [facility] TODAY FOR SUPPLIES GOING FORWARD    Assessment:       1. Attention to ileostomy    2. Irritant contact dermatitis due to ileostomy    3. Attention to urostomy        Plan:     Notified her   she is here   Changed all pouches and dressings and cleaned her up    I spent over 50% of this 40 minute visit in face to face counseling.       "

## 2023-03-01 NOTE — PROGRESS NOTES
CHIEF COMPLAINT:    Mrs. Riley is a 58 y.o. female presenting for a follow up on history of distal ureteral strictures secondary to XRT, patient is status post transverse colon conduit.      PRESENTING ILLNESS:    Edita Riley is a 58 y.o. female who is presents with a history of distal ureteral strcitures due to XRT for cervical cancer.  She underwent a transverse colon conduit on 9/11/2020.  The patient had bilateral hydronephrosis with reflux on the right side and a sluggish ureter on the left side.  This did not reflux.  She however, had kidney stones and was reverred to Dr. Russ for management.  She underwent a percutaneous antegrade approach on 10/27/2022.  Does not appear to have had a follow up scheduled.  She remains with bilateral percutaneous nephrostomy tubes.  She had a CT scan done on one of her ER visits on 12/2/2022 which showed a 2 mm stone in the right ureter and a 6 mm stone in the lower pole.  The calyces are delicately cupped.  The left was accidentally removed on 2/13/2023 and it was redone on 2/14/2023.  The right was replaced at the same time, per IR's note      Since the last time I saw her in the clinic, she has gained some weight.  Looks more robust.  She states she changes the pouch once a week, then back tracked and said she changed it 3 times a week.  She has an appointment with the enterostomal therapy nurse, Paulina Gilbert today.     REVIEW OF SYSTEMS:    Review of Systems   Constitutional: Negative.    HENT: Negative.     Eyes: Negative.    Respiratory: Negative.     Cardiovascular: Negative.    Gastrointestinal: Negative.    Genitourinary: Negative.    Musculoskeletal: Negative.    Skin: Negative.    Neurological: Negative.    Endo/Heme/Allergies: Negative.    Psychiatric/Behavioral: Negative.       PATIENT HISTORY:    Past Medical History:   Diagnosis Date    Abnormal mammogram 08/25/2020    Acute blood loss anemia     Acute deep vein thrombosis (DVT) of lower  extremity 12/09/2020    Advance care planning 04/30/2021    Anemia due to chronic blood loss     Anemia due to chronic kidney disease     Anxiety     Cardiovascular event risk -- low 09/14/2015    ASCVD 10-year risk 1.9% (with optimal risk factors 1.3%) as of 9/14/2015     Cervical cancer 2014    Chronic back pain     Colostomy care     Deep vein thrombosis     Depression     Diarrhea due to malabsorption 11/14/2018    Diarrhea due to malabsorption 11/14/2018    Difficult intubation     Disorder of kidney and ureter     DVT of lower extremity, bilateral 11/04/2020    Fibromyalgia     Fungemia 09/27/2020    Generalized abdominal pain 08/25/2020    GERD (gastroesophageal reflux disease)     Hemifacial spasm 09/16/2015    Hiatal hernia 2014    History of cervical cancer 10/11/2018    Hx of psychiatric care     Cymbalta, trazodone    Hypertension     Hypomagnesemia 11/21/2018    Lactose intolerance     Metastatic squamous cell carcinoma to lymph node 10/11/2018    Nephrostomy tube displaced     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 drug 11/14/2018    Osteoarthritis of back     Peritonitis 09/22/2020    Pseudomonas urinary tract infection 04/21/2021    Psychiatric problem     Refusal of blood transfusions as patient is [employer]     Schatzki's ring 09/14/2015    Seen on outside EGD 05/2014, underwent esophageal dilatation. Bx were negative.     Seizures     Sleep stage dysfunction     Noted on PSG 06/2017; negative for obstructive sleep apnea     Stroke     Urinary tract infection associated with nephrostomy catheter 06/11/2020    Wound infection after surgery 09/24/2020       Past Surgical History:   Procedure Laterality Date    ANTEGRADE NEPHROSTOGRAPHY Bilateral 9/28/2020    Procedure: Nephrostogram - antegrade;  Surgeon: Leslie Balbuena MD;  Location: [facility] OR [address] [place] [address];  Service: Urology;  Laterality: Bilateral;    ANTEGRADE NEPHROSTOGRAPHY Left 4/20/2021    Procedure: NEPHROSTOGRAM, ANTEGRADE;  Surgeon: Leslie  VIVIANA Balbuena MD;  [address];  Service: Urology;  Laterality: Left;    ANTEGRADE NEPHROSTOGRAPHY Right 10/27/2022    Procedure: NEPHROSTOGRAM, ANTEGRADE;  Surgeon: Chirag Russ MD;  [facility];  Service: Urology;  Laterality: Right;    BILATERAL OOPHORECTOMY  2015    CHOLECYSTECTOMY  11/09/2016    Done at [facility], path showed chronic cholecystitis and gallstones    cold knife conization  2014    COLECTOMY, RIGHT  9/17/2020    Procedure: COLECTOMY, RIGHT Extended;  Surgeon: Hammad Reynolds MD;  Location: [facility] OR 2ND FLR;  Service: Colon and Rectal;;    COLONOSCOPY  2014    COLONOSCOPY N/A 10/24/2016    at [facility], Dr Gutiérrez    COLONOSCOPY N/A 5/18/2018    Procedure: COLONOSCOPY;  Surgeon: Arden Gutiérrez MD;  Location: [facility] (4TH FLR);  Service: Endoscopy;  Laterality: N/A;    COLONOSCOPY N/A 7/28/2020    Procedure: COLONOSCOPY;  Surgeon: Hammad Reynolds MD;  Location: [facility] (4TH FLR);  Service: Colon and Rectal;  Laterality: N/A;  covid test [place] 7/25    CYSTOSCOPY WITH URETEROSCOPY, RETROGRADE PYELOGRAPHY, AND INSERTION OF STENT Bilateral 3/21/2020    Procedure: CYSTOSCOPY, WITH RETROGRADE PYELOGRAM,;  Surgeon: Leslie Balbuena MD;  [facility];  Service: Urology;  Laterality: Bilateral;    DILATION OF NEPHROSTOMY TRACT Right 10/27/2022    Procedure: DILATION, NEPHROSTOMY TRACT;  Surgeon: Chirag Russ MD;  Location: [address];  Service: Urology;  Laterality: Right;    ESOPHAGOGASTRODUODENOSCOPY  2014    ESOPHAGOGASTRODUODENOSCOPY  11/18/2020    ESOPHAGOGASTRODUODENOSCOPY N/A 11/18/2020    Procedure: ESOPHAGOGASTRODUODENOSCOPY (EGD);  Surgeon: Zenon Spencer MD;  Location: [facility];  Service: Endoscopy;  Laterality: N/A;    ESOPHAGOGASTRODUODENOSCOPY N/A 12/11/2020    Procedure: EGD (ESOPHAGOGASTRODUODENOSCOPY);  Surgeon: Juancho Muse MD;  [facility]: [facility] (2ND FLR);  Service: Endoscopy;  Laterality: N/A;    HYSTERECTOMY  2014     TV for cervical cancer    ILEOSTOMY  9/17/2020    Procedure: CREATION, ILEOSTOMY;  Surgeon: Hammad Reynolds MD;  Location: [facility] OR 2ND FLR;  Service: Colon and Rectal;;    LOOPOGRAM N/A 4/20/2021    Procedure: LOOPOGRAM;  Surgeon: Leslie Balbuena MD;  Location: [facility] OR 1ST FLR;  Service: Urology;  Laterality: N/A;    MOBILIZATION OF SPLENIC FLEXURE N/A 9/11/2020    Procedure: MOBILIZATION, COLONIC;  Surgeon: Hammad Reynolds MD;  Location: [facility] OR 2ND FLR;  Service: Colon and Rectal;  Laterality: N/A;    NEPHROSTOGRAPHY Bilateral 4/17/2021    Procedure: Nephrostogram;  Surgeon: Celeste Surgeon;  Location: [facility];  Service: Anesthesiology;  Laterality: Bilateral;    OOPHORECTOMY      PYELOSCOPY Right 10/27/2022    Procedure: PYELOSCOPY;  Surgeon: Chirag Russ MD;  Location: [facility] OR 1ST FLR;  Service: Urology;  Laterality: Right;    REPLACEMENT OF NEPHROSTOMY TUBE Right 10/27/2022    Procedure: REPLACEMENT, NEPHROSTOMY TUBE;  Surgeon: Chirag Russ MD;  Location: [facility] OR 1ST FLR;  Service: Urology;  Laterality: Right;    RETROPERITONEAL LYMPHADENECTOMY Right 9/17/2018    Procedure: LYMPHADENECTOMY, RETROPERITONEUM;  Surgeon: Sebas Reed MD;  Location: [facility] OR 2ND FLR;  Service: General;  Laterality: Right;  ILIAC    URETEROSCOPIC REMOVAL OF URETERIC CALCULUS Right 10/27/2022    Procedure: REMOVAL, CALCULUS, URETER, URETEROSCOPIC;  Surgeon: Chirag Russ MD;  [facility];  Service: Urology;  Laterality: Right;    URETEROSCOPY Right 10/27/2022    Procedure: URETEROSCOPY-ANTEGRADE;  Surgeon: Chirag Russ MD;  Location: [facility] OR [facility] FLR;  Service: Urology;  Laterality: Right;       Family History   Problem Relation Age of Onset    Heart disease Sister     Heart disease Maternal Grandmother     Colon cancer Father     Esophageal cancer Father     Cancer Father         Lung-smoker    Cancer Mother         Cervical    Cervical cancer Mother     Breast cancer Maternal Aunt     Suicide  Daughter         jumped from parking structure    Drug abuse Daughter     Drug abuse Daughter      Social History     Socioeconomic History    Marital status:      Spouse name: Hammad    Number of children: 2   Tobacco Use    Smoking status: Never    Smokeless tobacco: Never   Substance and Sexual Activity    Alcohol use: No     Alcohol/week: 0.0 standard drinks    Drug use: No    Sexual activity: Yes     Partners: Male     Birth control/protection: None     Comment:  19 years since    Social History Narrative    , twin daughters (1  2018), disabled due to childhood stroke, Yarsani sophia     Social Determinants of Health     Financial Resource Strain: Low Risk     Difficulty of Paying Living Expenses: Not very hard   Food Insecurity: No Food Insecurity    Worried About Running Out of Food in the Last Year: Never true    Ran Out of Food in the Last Year: Never true   Transportation Needs: No Transportation Needs    Lack of Transportation (Medical): No    Lack of Transportation (Non-Medical): No   Physical Activity: Inactive    Days of Exercise per Week: 0 days    Minutes of Exercise per Session: 0 min   Stress: No Stress Concern Present    Feeling of Stress : Only a little   Social Connections: Moderately Isolated    Frequency of Communication with Friends and Family: More than three times a week    Frequency of Social Gatherings with Friends and Family: More than three times a week    Attends Mandaen Services: Never    Active Member of Clubs or Organizations: No    Attends Club or Organization Meetings: Never    Marital Status:    Housing Stability: Low Risk     Unable to Pay for Housing in the Last Year: No    Number of Places Lived in the Last Year: 1    Unstable Housing in the Last Year: No       Allergies:  Bee sting [allergen ext-venom-honey bee] and Grass pollen-bermuda, standard    Medications:  Outpatient Encounter Medications as of 3/1/2023    Medication Sig Dispense Refill    acetaminophen (TYLENOL) 500 MG tablet Take 2 tablets (1,000 mg total) by mouth every 8 (eight) hours as needed for Pain. 90 tablet 0    [] ciprofloxacin HCl (CIPRO) 500 MG tablet Take 1 tablet (500 mg total) by mouth every 12 (twelve) hours. for 6 days 12 tablet 0    gabapentin (NEURONTIN) 100 MG capsule Take 2 capsules (200 mg total) by mouth every evening. 90 capsule 3    ketorolac (TORADOL) 10 mg tablet Take 1 tablet (10 mg total) by mouth every 6 (six) hours as needed for Pain. 30 tablet 0    melatonin (MELATIN) 3 mg tablet Take 2 tablets (6 mg total) by mouth nightly as needed for Insomnia. 60 tablet 0    mirtazapine (REMERON SOL-TAB) 15 MG disintegrating tablet Dissolve 1 tablet (15 mg total) by mouth nightly. 30 tablet 11    sodium bicarbonate 650 MG tablet Take 2 tablets (1,300 mg total) by mouth 2 (two) times daily. 120 tablet 11    sucralfate (CARAFATE) 1 gram tablet Take 1 tablet (1 g total) by mouth 4 (four) times daily before meals and nightly. 120 tablet 2           PHYSICAL EXAMINATION:    The patient generally appears in good health, is appropriately interactive, and is in no apparent distress.    Skin: No lesions.    Mental: Cooperative with normal affect.    Neuro: Grossly intact.    HEENT: Normal. No evidence of lymphadenopathy.    Chest:  normal inspiratory effort.    Abdomen: Soft, non-tender. No masses or organomegaly. Bladder is not palpable. No evidence of flank discomfort. No evidence of inguinal hernia.    Extremities: No clubbing, cyanosis, or edema    Both nephrostomy tubes are draining clear yellow urine  LABS:    Lab Results   Component Value Date    BUN 8 2023    CREATININE 1.0 2023       IMPRESSION:    Left hydronephrosis, right kidney and ureteral calculi.     PLAN:    1.  CT RSS to assess the stone burden on the right side.  If the stones have cleared, then would consider removing the right nephrostomy tube.  May need to revise  the left side and  the colon conduit higher on the ureter.     NOTE:  after she went to see Paulina, she was brought back and the left nephrostomy tube had gotten hooked on the chair and came out entirely.  After discussion with IR, she was directed to the ER to facilitate replacing the tube.

## 2023-03-01 NOTE — CONSULTS
Inpatient Radiology Consult Note    History of Present Illness:  Edita Riley is a 58 y.o. female who presents to the ED for dislodged left nephrostomy tube. This occurred earlier in the day while at her Urology clinic visit and she was sent to the ED to have her neph tube replaced. No other acute issues reported at this time.  PMHx notable for cervical cancer s/p pelvic radiation complicated by bilateral ureteral strictures and bilateral hydronephrosis R>L, and s/p colon conduit urinary diversion on 9/11/2020.  She has since been managed with percutaneous nephrostomy tube changes since April 2021.  Pt also seeing Urology for right ureteral calculi.        Plan:    Pt currently stable and no concerns for sepsis. Will replace  Left neph tube as soon as possible (tentatively tomorrow 3/2). Plan discussed w/ Dr. Lehman.   2. NPO at midnight.  3. Hold anticoagulation.     Alanis Ron MD  Department of Radiology, PGY-5  Pager: [phone]

## 2023-03-02 VITALS
SYSTOLIC BLOOD PRESSURE: 104 MMHG | HEART RATE: 61 BPM | DIASTOLIC BLOOD PRESSURE: 57 MMHG | RESPIRATION RATE: 15 BRPM | BODY MASS INDEX: 26.58 KG/M2 | OXYGEN SATURATION: 99 % | WEIGHT: 180 LBS | TEMPERATURE: 98 F

## 2023-03-02 LAB
ANION GAP SERPL CALC-SCNC: 4 MMOL/L (ref 8–16)
BASOPHILS # BLD AUTO: 0.04 K/UL (ref 0–0.2)
BASOPHILS NFR BLD: 0.5 % (ref 0–1.9)
BUN SERPL-MCNC: 15 MG/DL (ref 6–20)
CALCIUM SERPL-MCNC: 8.9 MG/DL (ref 8.7–10.5)
CHLORIDE SERPL-SCNC: 113 MMOL/L (ref 95–110)
CO2 SERPL-SCNC: 24 MMOL/L (ref 23–29)
CREAT SERPL-MCNC: 1.2 MG/DL (ref 0.5–1.4)
DIFFERENTIAL METHOD: ABNORMAL
EOSINOPHIL # BLD AUTO: 0.3 K/UL (ref 0–0.5)
EOSINOPHIL NFR BLD: 3.2 % (ref 0–8)
ERYTHROCYTE [DISTWIDTH] IN BLOOD BY AUTOMATED COUNT: 15.2 % (ref 11.5–14.5)
EST. GFR  (NO RACE VARIABLE): 51.8 ML/MIN/1.73 M^2
GLUCOSE SERPL-MCNC: 82 MG/DL (ref 70–110)
HCT VFR BLD AUTO: 37.3 % (ref 37–48.5)
HGB BLD-MCNC: 10.8 G/DL (ref 12–16)
IMM GRANULOCYTES # BLD AUTO: 0.03 K/UL (ref 0–0.04)
IMM GRANULOCYTES NFR BLD AUTO: 0.4 % (ref 0–0.5)
LYMPHOCYTES # BLD AUTO: 1.7 K/UL (ref 1–4.8)
LYMPHOCYTES NFR BLD: 20.4 % (ref 18–48)
MCH RBC QN AUTO: 26.5 PG (ref 27–31)
MCHC RBC AUTO-ENTMCNC: 29 G/DL (ref 32–36)
MCV RBC AUTO: 92 FL (ref 82–98)
MONOCYTES # BLD AUTO: 1.3 K/UL (ref 0.3–1)
MONOCYTES NFR BLD: 15.7 % (ref 4–15)
NEUTROPHILS # BLD AUTO: 5.1 K/UL (ref 1.8–7.7)
NEUTROPHILS NFR BLD: 59.8 % (ref 38–73)
NRBC BLD-RTO: 0 /100 WBC
PLATELET # BLD AUTO: 246 K/UL (ref 150–450)
PMV BLD AUTO: 10 FL (ref 9.2–12.9)
POTASSIUM SERPL-SCNC: 3.8 MMOL/L (ref 3.5–5.1)
RBC # BLD AUTO: 4.07 M/UL (ref 4–5.4)
SODIUM SERPL-SCNC: 141 MMOL/L (ref 136–145)
WBC # BLD AUTO: 8.45 K/UL (ref 3.9–12.7)

## 2023-03-02 PROCEDURE — 63600175 PHARM REV CODE 636 W HCPCS

## 2023-03-02 PROCEDURE — G0378 HOSPITAL OBSERVATION PER HR: HCPCS

## 2023-03-02 PROCEDURE — 85025 COMPLETE CBC W/AUTO DIFF WBC: CPT

## 2023-03-02 PROCEDURE — 36415 COLL VENOUS BLD VENIPUNCTURE: CPT

## 2023-03-02 PROCEDURE — 63600175 PHARM REV CODE 636 W HCPCS: Performed by: STUDENT IN AN ORGANIZED HEALTH CARE EDUCATION/TRAINING PROGRAM

## 2023-03-02 PROCEDURE — 25500020 PHARM REV CODE 255: Performed by: UROLOGY

## 2023-03-02 PROCEDURE — 80048 BASIC METABOLIC PNL TOTAL CA: CPT

## 2023-03-02 PROCEDURE — 25000003 PHARM REV CODE 250: Performed by: STUDENT IN AN ORGANIZED HEALTH CARE EDUCATION/TRAINING PROGRAM

## 2023-03-02 PROCEDURE — 96361 HYDRATE IV INFUSION ADD-ON: CPT | Mod: 59

## 2023-03-02 RX ORDER — FENTANYL CITRATE 50 UG/ML
INJECTION, SOLUTION INTRAMUSCULAR; INTRAVENOUS
Status: COMPLETED | OUTPATIENT
Start: 2023-03-02 | End: 2023-03-02

## 2023-03-02 RX ORDER — MIDAZOLAM HYDROCHLORIDE 1 MG/ML
INJECTION INTRAMUSCULAR; INTRAVENOUS
Status: COMPLETED | OUTPATIENT
Start: 2023-03-02 | End: 2023-03-02

## 2023-03-02 RX ORDER — LIDOCAINE HYDROCHLORIDE 10 MG/ML
INJECTION INFILTRATION; PERINEURAL
Status: COMPLETED | OUTPATIENT
Start: 2023-03-02 | End: 2023-03-02

## 2023-03-02 RX ORDER — TRAMADOL HYDROCHLORIDE 50 MG/1
50 TABLET ORAL EVERY 6 HOURS
Qty: 5 TABLET | Refills: 0 | Status: ON HOLD | OUTPATIENT
Start: 2023-03-02 | End: 2023-04-25 | Stop reason: HOSPADM

## 2023-03-02 RX ADMIN — MIDAZOLAM HYDROCHLORIDE 1 MG: 1 INJECTION, SOLUTION INTRAMUSCULAR; INTRAVENOUS at 10:03

## 2023-03-02 RX ADMIN — SODIUM CHLORIDE, POTASSIUM CHLORIDE, SODIUM LACTATE AND CALCIUM CHLORIDE: 600; 310; 30; 20 INJECTION, SOLUTION INTRAVENOUS at 07:03

## 2023-03-02 RX ADMIN — DEXTROSE MONOHYDRATE 1 G: 5 INJECTION INTRAVENOUS at 10:03

## 2023-03-02 RX ADMIN — LIDOCAINE HYDROCHLORIDE 5 ML: 10 INJECTION, SOLUTION INFILTRATION; PERINEURAL at 10:03

## 2023-03-02 RX ADMIN — IOHEXOL 20 ML: 300 INJECTION, SOLUTION INTRAVENOUS at 11:03

## 2023-03-02 RX ADMIN — FENTANYL CITRATE 50 MCG: 0.05 INJECTION, SOLUTION INTRAMUSCULAR; INTRAVENOUS at 10:03

## 2023-03-02 NOTE — CONSULTS
[facility] - Emergency Dept  Urology  Consult Note    Patient Name: Edita Riley  MRN: [id number]  Admission Date: 3/1/2023  Hospital Length of Stay: 0   Code Status: Full Code   Attending Provider: Leslie Balbuena MD   Consulting Provider: Rufus Dunn MD  Primary Care Physician: Primary Doctor No  Principal Problem:Bilateral ureteral obstruction    Inpatient consult to Urology  Consult performed by: Rufus Dunn MD  Consult ordered by: Rufus Dunn MD          Subjective:     HPI:  Edita Riley is a 58 y.o. female who is presents with a history of distal ureteral strcitures due to XRT for cervical cancer.  She underwent a transverse colon conduit on 9/11/2020.  The patient had bilateral hydronephrosis with reflux on the right side and a sluggish ureter on the left side.  This did not reflux.  She however, had kidney stones and was reverred to Dr. Russ for management.  She underwent a percutaneous antegrade approach on 10/27/2022.  Does not appear to have had a follow up scheduled.  She remains with bilateral percutaneous nephrostomy tubes.  She had a CT scan done on one of her ER visits on 12/2/2022 which showed a 2 mm stone in the right ureter and a 6 mm stone in the lower pole.  The calyces are delicately cupped.  The left was accidentally removed on 2/13/2023 and it was redone on 2/14/2023.  The right was replaced at the same time, per IR's note      Urology is consulted for accidental displacement of her left PCNT. At bedside she is AFVSS, in NAD, with stable labs. IR with plans to take her to the OR tomorrow for replacement of her left PCNT.      Past Medical History:   Diagnosis Date    Abnormal mammogram 08/25/2020    Acute blood loss anemia     Acute deep vein thrombosis (DVT) of lower extremity 12/09/2020    Advance care planning 04/30/2021    Anemia due to chronic blood loss     Anemia due to chronic kidney disease     Anxiety     Bilateral ureteral obstruction 9/11/2020     Cardiovascular event risk -- low 09/14/2015    ASCVD 10-year risk 1.9% (with optimal risk factors 1.3%) as of 9/14/2015     Cervical cancer 2014    Chronic back pain     Colostomy care     Deep vein thrombosis     Depression     Diarrhea due to malabsorption 11/14/2018    Diarrhea due to malabsorption 11/14/2018    Difficult intubation     Disorder of kidney and ureter     DVT of lower extremity, bilateral 11/04/2020    Fibromyalgia     Fungemia 09/27/2020    Generalized abdominal pain 08/25/2020    GERD (gastroesophageal reflux disease)     Hemifacial spasm 09/16/2015    Hiatal hernia 2014    History of cervical cancer 10/11/2018    Hx of psychiatric care     Cymbalta, trazodone    Hypertension     Hypomagnesemia 11/21/2018    Lactose intolerance     Metastatic squamous cell carcinoma to lymph node 10/11/2018    Nephrostomy tube displaced     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 drug 11/14/2018    Osteoarthritis of back     Peritonitis 09/22/2020    Pseudomonas urinary tract infection 04/21/2021    Psychiatric problem     Refusal of blood transfusions as patient is [employer]     Schatzki's ring 09/14/2015    Seen on outside EGD 05/2014, underwent esophageal dilatation. Bx were negative.     Seizures     Sleep stage dysfunction     Noted on PSG 06/2017; negative for obstructive sleep apnea     Stroke     Urinary tract infection associated with nephrostomy catheter 06/11/2020    Wound infection after surgery 09/24/2020       Past Surgical History:   Procedure Laterality Date    ANTEGRADE NEPHROSTOGRAPHY Bilateral 9/28/2020    Procedure: Nephrostogram - antegrade;  Surgeon: Leslie Balbuena MD;  Location: [address];  Service: Urology;  Laterality: Bilateral;    ANTEGRADE NEPHROSTOGRAPHY Left 4/20/2021    Procedure: NEPHROSTOGRAM, ANTEGRADE;  Surgeon: Leslie Balbuena MD;  Location: [facility] OR [address] [place] [address];  Service: Urology;  Laterality: Left;    ANTEGRADE NEPHROSTOGRAPHY Right 10/27/2022    Procedure:  NEPHROSTOGRAM, ANTEGRADE;  Surgeon: Chirag Russ MD;  Location: [facility] OR 1ST FLR;  Service: Urology;  Laterality: Right;    BILATERAL OOPHORECTOMY  2015    CHOLECYSTECTOMY  11/09/2016    Done at [facility], path showed chronic cholecystitis and gallstones    cold knife conization  2014    COLECTOMY, RIGHT  9/17/2020    Procedure: COLECTOMY, RIGHT Extended;  Surgeon: Hammad Reynolds MD;  Location: [facility] OR 2ND FLR;  Service: Colon and Rectal;;    COLONOSCOPY  2014    COLONOSCOPY N/A 10/24/2016    at [facility], Dr Gutiérrez    COLONOSCOPY N/A 5/18/2018    Procedure: COLONOSCOPY;  Surgeon: Arden Gutiérrez MD;  Location: [facility] ENDO (4TH FLR);  Service: Endoscopy;  Laterality: N/A;    COLONOSCOPY N/A 7/28/2020    Procedure: COLONOSCOPY;  Surgeon: Hammad Reynolds MD;  Location: [facility] (4TH FLR);  Service: Colon and Rectal;  Laterality: N/A;  covid test Circle 7/25    CYSTOSCOPY WITH URETEROSCOPY, RETROGRADE PYELOGRAPHY, AND INSERTION OF STENT Bilateral 3/21/2020    Procedure: CYSTOSCOPY, WITH RETROGRADE PYELOGRAM,;  Surgeon: Leslie Balbuena MD;  Location: [facility] OR 1ST FLR;  Service: Urology;  Laterality: Bilateral;    DILATION OF NEPHROSTOMY TRACT Right 10/27/2022    Procedure: DILATION, NEPHROSTOMY TRACT;  Surgeon: Chirag Russ MD;  Location: [facility] OR 1ST FLR;  Service: Urology;  Laterality: Right;    ESOPHAGOGASTRODUODENOSCOPY  2014    ESOPHAGOGASTRODUODENOSCOPY  11/18/2020    ESOPHAGOGASTRODUODENOSCOPY N/A 11/18/2020    Procedure: ESOPHAGOGASTRODUODENOSCOPY (EGD);  Surgeon: Zenon Spencer MD;  Location: [facility];  Service: Endoscopy;  Laterality: N/A;    ESOPHAGOGASTRODUODENOSCOPY N/A 12/11/2020    Procedure: EGD (ESOPHAGOGASTRODUODENOSCOPY);  Surgeon: Juancho Muse MD;  Location: [facility] ENDO (2ND FLR);  Service: Endoscopy;  Laterality: N/A;    HYSTERECTOMY  2014    [facility] for cervical cancer    ILEOSTOMY  9/17/2020    Procedure: CREATION, ILEOSTOMY;  Surgeon: Hammad Reynolds MD;  Location: [facility] OR 2ND FLR;   Service: Colon and Rectal;;    LOOPOGRAM N/A 4/20/2021    Procedure: LOOPOGRAM;  Surgeon: Leslie Balbuena MD;  Location: [facility] OR 1ST FLR;  Service: Urology;  Laterality: N/A;    MOBILIZATION OF SPLENIC FLEXURE N/A 9/11/2020    Procedure: MOBILIZATION, COLONIC;  Surgeon: Hammad Reynolds MD;  Location: [facility] OR 2ND FLR;  Service: Colon and Rectal;  Laterality: N/A;    NEPHROSTOGRAPHY Bilateral 4/17/2021    Procedure: Nephrostogram;  Surgeon: Celeste Surgeon;  Location: [facility] CELESTE;  Service: Anesthesiology;  Laterality: Bilateral;    OOPHORECTOMY      PYELOSCOPY Right 10/27/2022    Procedure: PYELOSCOPY;  Surgeon: Chirag Russ MD;  [facility];  Service: Urology;  Laterality: Right;    REPLACEMENT OF NEPHROSTOMY TUBE Right 10/27/2022    Procedure: REPLACEMENT, NEPHROSTOMY TUBE;  Surgeon: Chirag Russ MD;  [facility];  Service: Urology;  Laterality: Right;    RETROPERITONEAL LYMPHADENECTOMY Right 9/17/2018    Procedure: LYMPHADENECTOMY, RETROPERITONEUM;  Surgeon: Sebas Reed MD;  Location: [facility] OR [facility] FLR;  Service: General;  Laterality: Right;  ILIAC    URETEROSCOPIC REMOVAL OF URETERIC CALCULUS Right 10/27/2022    Procedure: REMOVAL, CALCULUS, URETER, URETEROSCOPIC;  Surgeon: Chirag Russ MD;  Location: [facility] OR [address] [place] [address];  Service: Urology;  Laterality: Right;    URETEROSCOPY Right 10/27/2022    Procedure: URETEROSCOPY-ANTEGRADE;  Surgeon: Chirag Russ MD;  [facility];  Service: Urology;  Laterality: Right;       Review of patient's allergies indicates:   Allergen Reactions    Bee sting [allergen ext-venom-honey bee]      Rash      Grass pollen-bermuda, standard      rash       Family History       Problem Relation (Age of Onset)    Breast cancer Maternal Aunt    Cancer Father, Mother    Cervical cancer Mother    Colon cancer Father    Drug abuse Daughter, Daughter    Esophageal cancer Father    Heart disease Sister, Maternal Grandmother    Suicide  Daughter            Tobacco Use    Smoking status: Never    Smokeless tobacco: Never   Substance and Sexual Activity    Alcohol use: No     Alcohol/week: 0.0 standard drinks    Drug use: No    Sexual activity: Yes     Partners: Male     Birth control/protection: None     Comment:  19 years since 1999       Review of Systems   Constitutional:  Negative for chills and fever.   HENT:  Negative for sore throat and trouble swallowing.    Eyes:  Negative for pain and discharge.   Respiratory:  Negative for shortness of breath and wheezing.    Cardiovascular:  Negative for chest pain and palpitations.   Gastrointestinal:  Negative for abdominal pain, diarrhea, nausea and vomiting.   Genitourinary:         As per HPI   Musculoskeletal:  Negative for back pain and joint swelling.   Skin:  Negative for rash and wound.   Neurological:  Negative for seizures, weakness and headaches.   Psychiatric/Behavioral:  Negative for hallucinations. The patient is not nervous/anxious.      Objective:     Temp:  [97.9 °F (36.6 °C)] 97.9 °F (36.6 °C)  Pulse:  [73-93] 75  Resp:  [16-18] 16  SpO2:  [99 %-100 %] 100 %  BP: ()/(61-71) 117/64     Body mass index is 26.58 kg/m².           Drains       Drain  Duration                  Urostomy 09/11/20 0000 ileal conduit  days         Ileostomy 09/18/20  days         Nephrostomy 02/14/23 0914 Left 12 Fr. 15 days         Nephrostomy 02/14/23 0918 Right 12 Fr. 15 days                    Physical Exam  Vitals and nursing note reviewed.   Constitutional:       General: She is not in acute distress.  HENT:      Head: Atraumatic.      Nose: Nose normal.   Eyes:      Extraocular Movements: Extraocular movements intact.   Cardiovascular:      Rate and Rhythm: Normal rate.   Pulmonary:      Effort: Pulmonary effort is normal.   Abdominal:      General: Abdomen is flat. There is no distension.      Tenderness: There is no abdominal tenderness. There is no right CVA tenderness or  left CVA tenderness.      Comments: Right PCNT in place draining c/y/u. Left PCNT displaced with bandage covering incision site, c/d/I.   Musculoskeletal:         General: Normal range of motion.      Cervical back: Normal range of motion.   Skin:     Coloration: Skin is not jaundiced.   Neurological:      General: No focal deficit present.      Mental Status: She is alert and oriented to person, place, and time.   Psychiatric:         Mood and Affect: Mood normal.         Behavior: Behavior normal.       Significant Labs:    BMP:  No results for input(s): NA, K, CL, CO2, BUN, CREATININE, LABGLOM, GLUCOSE, CALCIUM in the last 168 hours.    CBC:  Recent Labs   Lab 03/01/23  1706   WBC 11.28   HGB 12.2   HCT 41.0          All pertinent labs results from the past 24 hours have been reviewed.    Significant Imaging:  All pertinent imaging results/findings from the past 24 hours have been reviewed.                      Assessment and Plan:     * Bilateral ureteral obstruction  Edita Riley is a 58 y.o. female with b/l obstructive uropathy managed by chronic b/l PCNT, now with displacement of her left PCNT.    - Consult d/w Staff Urologist  - Strict I's/O's  - Trend Cr, avoid nephrotoxic agents, and dose medications to patient's current GFR  - Will admit to urology obs for IR procedure tomorrow  - NPO @[facility]  - Resume all home meds  - mIVF          VTE Risk Mitigation (From admission, onward)           Ordered     IP VTE HIGH RISK PATIENT  Once         03/01/23 1759     Place ANANT hose  Until discontinued         03/01/23 1759     Place sequential compression device  Until discontinued         03/01/23 1759                    Thank you for your consult. I will follow-up with patient. Please contact us if you have any additional questions.    Rufus Dunn MD  Urology  [facility]    As above.

## 2023-03-02 NOTE — SUBJECTIVE & OBJECTIVE
Interval History: NAEO.  AFVSS.  Cr 1.2 from 1.4.  Resting comfortably in bed.  Endorsing some L flank pain.  To OR today for L PCNT replacement.      R NT: -/300/350   Ileal conduit: -/-/400    Review of Systems  Objective:     Temp:  [97.6 °F (36.4 °C)-97.9 °F (36.6 °C)] 97.6 °F (36.4 °C)  Pulse:  [69-93] 72  Resp:  [16-18] 18  SpO2:  [97 %-100 %] 97 %  BP: ()/(54-71) 116/64     Body mass index is 26.58 kg/m².           Drains       Drain  Duration                  Urostomy 09/11/20 0000 ileal conduit  days         Ileostomy 09/18/20  days         Nephrostomy 02/14/23 Right 16 days         Nephrostomy 02/14/23 0918 Right 12 Fr. 15 days                    Physical Exam  Vitals and nursing note reviewed.   Constitutional:       General: She is not in acute distress.  HENT:      Head: Atraumatic.      Nose: Nose normal.   Eyes:      Extraocular Movements: Extraocular movements intact.   Cardiovascular:      Rate and Rhythm: Normal rate.   Pulmonary:      Effort: Pulmonary effort is normal.   Abdominal:      General: Abdomen is flat. There is no distension.      Tenderness: There is no abdominal tenderness. There is no right CVA tenderness or left CVA tenderness.      Comments: Right PCNT in place draining c/y/u. Left PCNT displaced with bandage covering incision site, c/d/I.   Musculoskeletal:         General: Normal range of motion.      Cervical back: Normal range of motion.   Skin:     Coloration: Skin is not jaundiced.   Neurological:      General: No focal deficit present.      Mental Status: She is alert and oriented to person, place, and time.   Psychiatric:         Mood and Affect: Mood normal.         Behavior: Behavior normal.       Significant Labs:    BMP:  Recent Labs   Lab 03/01/23  1948 03/02/23  0355    141   K 4.2 3.8    113*   CO2 26 24   BUN 18 15   CREATININE 1.4 1.2   CALCIUM 9.7 8.9       CBC:   Recent Labs   Lab 03/01/23  1706 03/02/23  0355   WBC 11.28 8.45    HGB 12.2 10.8*   HCT 41.0 37.3    246       All pertinent labs results from the past 24 hours have been reviewed.  Recent Lab Results         03/02/23  0355   03/01/23  1948   03/01/23  1706        Albumin   3.4         Alkaline Phosphatase   104         ALT   9         Anion Gap 4   6         AST   13         Baso # 0.04     0.04       Basophil % 0.5     0.4       BILIRUBIN TOTAL   0.5  Comment: For infants and newborns, interpretation of results should be based  on gestational age, weight and in agreement with clinical  observations.    Premature Infant recommended reference ranges:  Up to 24 hours.............<8.0 mg/dL  Up to 48 hours............<12.0 mg/dL  3-5 days..................<15.0 mg/dL  6-29 days.................<15.0 mg/dL           BUN 15   18         Calcium 8.9   9.7         Chloride 113   110         CO2 24   26         Creatinine 1.2   1.4         Differential Method Automated     Automated       eGFR 51.8   43.1         Eos # 0.3     0.1       Eosinophil % 3.2     1.2       Glucose 82   81         Gran # (ANC) 5.1     8.2       Gran % 59.8     72.8       Hematocrit 37.3     41.0       Hemoglobin 10.8     12.2       Immature Grans (Abs) 0.03  Comment: Mild elevation in immature granulocytes is non specific and   can be seen in a variety of conditions including stress response,   acute inflammation, trauma and pregnancy. Correlation with other   laboratory and clinical findings is essential.       0.03  Comment: Mild elevation in immature granulocytes is non specific and   can be seen in a variety of conditions including stress response,   acute inflammation, trauma and pregnancy. Correlation with other   laboratory and clinical findings is essential.         Immature Granulocytes 0.4     0.3       INR     1.0  Comment: Coumadin Therapy:  2.0 - 3.0 for INR for all indicators except mechanical heart valves  and antiphospholipid syndromes which should use 2.5 - 3.5.         Lymph # 1.7      1.6       Lymph % 20.4     14.3       MCH 26.5     27.3       MCHC 29.0     29.8       MCV 92     92       Mono # 1.3     1.2       Mono % 15.7     11.0       MPV 10.0     11.1       nRBC 0     0       Platelets 246     199       Potassium 3.8   4.2         PROTEIN TOTAL   7.2         Protime     10.4       RBC 4.07     4.47       RDW 15.2     16.7       Sodium 141   142         WBC 8.45     11.28               Significant Imaging:  All pertinent imaging results/findings from the past 24 hours have been reviewed.

## 2023-03-02 NOTE — ED NOTES
Received report from AMY Llamas. Pt sitting in bed with eyes closed. Chest rise and fall noted. No complaints at this time. VSS. Bed locked in lowest position. SR up x 2. Call light within reach

## 2023-03-02 NOTE — PLAN OF CARE
Pt arrived to IR room 190 for Lt neph tube replacement. Pt oriented to unit and staff. Plan of care reviewed with patient, patient verbalizes understanding. Comfort measures utilized. Pt safely transferred from stretcher to procedural table. Fall risk reviewed with patient, fall risk interventions maintained. Safety strap applied, positioner pillows utilized to minimize pressure points. Blankets applied. Pt prepped and draped utilizing standard sterile technique. Patient placed on continuous monitoring, as required by sedation policy. Timeouts completed utilizing standard universal time-out, per department and facility policy. RN to remain at bedside, continuous monitoring maintained. Pt resting comfortably. Denies pain/discomfort. Will continue to monitor. See flow sheets for monitoring, medication administration, and updates.

## 2023-03-02 NOTE — SUBJECTIVE & OBJECTIVE
Past Medical History:   Diagnosis Date    Abnormal mammogram 08/25/2020    Acute blood loss anemia     Acute deep vein thrombosis (DVT) of lower extremity 12/09/2020    Advance care planning 04/30/2021    Anemia due to chronic blood loss     Anemia due to chronic kidney disease     Anxiety     Bilateral ureteral obstruction 9/11/2020    Cardiovascular event risk -- low 09/14/2015    ASCVD 10-year risk 1.9% (with optimal risk factors 1.3%) as of 9/14/2015     Cervical cancer 2014    Chronic back pain     Colostomy care     Deep vein thrombosis     Depression     Diarrhea due to malabsorption 11/14/2018    Diarrhea due to malabsorption 11/14/2018    Difficult intubation     Disorder of kidney and ureter     DVT of lower extremity, bilateral 11/04/2020    Fibromyalgia     Fungemia 09/27/2020    Generalized abdominal pain 08/25/2020    GERD (gastroesophageal reflux disease)     Hemifacial spasm 09/16/2015    Hiatal hernia 2014    History of cervical cancer 10/11/2018    Hx of psychiatric care     Cymbalta, trazodone    Hypertension     Hypomagnesemia 11/21/2018    Lactose intolerance     Metastatic squamous cell carcinoma to lymph node 10/11/2018    Nephrostomy tube displaced     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 drug 11/14/2018    Osteoarthritis of back     Peritonitis 09/22/2020    Pseudomonas urinary tract infection 04/21/2021    Psychiatric problem     Refusal of blood transfusions as patient is Worship     Schatzki's ring 09/14/2015    Seen on outside EGD 05/2014, underwent esophageal dilatation. Bx were negative.     Seizures     Sleep stage dysfunction     Noted on PSG 06/2017; negative for obstructive sleep apnea     Stroke     Urinary tract infection associated with nephrostomy catheter 06/11/2020    Wound infection after surgery 09/24/2020       Past Surgical History:   Procedure Laterality Date    ANTEGRADE NEPHROSTOGRAPHY Bilateral 9/28/2020    Procedure: Nephrostogram - antegrade;  Surgeon:  Leslie Balbuena MD;  Location: [facility] OR 1ST FLR;  Service: Urology;  Laterality: Bilateral;    ANTEGRADE NEPHROSTOGRAPHY Left 4/20/2021    Procedure: NEPHROSTOGRAM, ANTEGRADE;  Surgeon: Leslie Balbuena MD;  Location: [facility] OR 1ST FLR;  Service: Urology;  Laterality: Left;    ANTEGRADE NEPHROSTOGRAPHY Right 10/27/2022    Procedure: NEPHROSTOGRAM, ANTEGRADE;  Surgeon: Chirag Russ MD;  [facility];  Service: Urology;  Laterality: Right;    BILATERAL OOPHORECTOMY  2015    CHOLECYSTECTOMY  11/09/2016    Done at [facility], path showed chronic cholecystitis and gallstones    cold knife conization  2014    COLECTOMY, RIGHT  9/17/2020    Procedure: COLECTOMY, RIGHT Extended;  Surgeon: Hammad Reynolds MD;  Location: [facility] OR 2ND FLR;  Service: Colon and Rectal;;    COLONOSCOPY  2014    COLONOSCOPY N/A 10/24/2016    at [facility], Dr Gutiérrez    COLONOSCOPY N/A 5/18/2018    Procedure: COLONOSCOPY;  Surgeon: Arden Gutiérrez MD;  Location: [facility] (4TH FLR);  Service: Endoscopy;  Laterality: N/A;    COLONOSCOPY N/A 7/28/2020    Procedure: COLONOSCOPY;  Surgeon: Hammad Reynolds MD;  Location: [facility] (4TH FLR);  Service: Colon and Rectal;  Laterality: N/A;  covid test [place] 7/25    CYSTOSCOPY WITH URETEROSCOPY, RETROGRADE PYELOGRAPHY, AND INSERTION OF STENT Bilateral 3/21/2020    Procedure: CYSTOSCOPY, WITH RETROGRADE PYELOGRAM,;  Surgeon: Leslie Balbuena MD;  [facility];  Service: Urology;  Laterality: Bilateral;    DILATION OF NEPHROSTOMY TRACT Right 10/27/2022    Procedure: DILATION, NEPHROSTOMY TRACT;  Surgeon: Chirag Russ MD;  [facility];  Service: Urology;  Laterality: Right;    ESOPHAGOGASTRODUODENOSCOPY  2014    ESOPHAGOGASTRODUODENOSCOPY  11/18/2020    ESOPHAGOGASTRODUODENOSCOPY N/A 11/18/2020    Procedure: ESOPHAGOGASTRODUODENOSCOPY (EGD);  Surgeon: Zenon Spencer MD;  Location: [facility] ENDO;  Service: Endoscopy;  Laterality: N/A;     ESOPHAGOGASTRODUODENOSCOPY N/A 12/11/2020    Procedure: EGD (ESOPHAGOGASTRODUODENOSCOPY);  Surgeon: Juancho Muse MD;  Location: [facility] ENDO (2ND FLR);  Service: Endoscopy;  Laterality: N/A;    HYSTERECTOMY  2014    TV for cervical cancer    ILEOSTOMY  9/17/2020    Procedure: CREATION, ILEOSTOMY;  Surgeon: Hammad Reynolds MD;  Location: [facility] OR 2ND FLR;  Service: Colon and Rectal;;    LOOPOGRAM N/A 4/20/2021    Procedure: LOOPOGRAM;  Surgeon: Leslie Balbuena MD;  Location: [facility] OR 1ST FLR;  Service: Urology;  Laterality: N/A;    MOBILIZATION OF SPLENIC FLEXURE N/A 9/11/2020    Procedure: MOBILIZATION, COLONIC;  Surgeon: Hammad Reynolds MD;  Location: [facility] OR 2ND FLR;  Service: Colon and Rectal;  Laterality: N/A;    NEPHROSTOGRAPHY Bilateral 4/17/2021    Procedure: Nephrostogram;  Surgeon: Celeste Surgeon;  Location: [facility];  Service: Anesthesiology;  Laterality: Bilateral;    OOPHORECTOMY      PYELOSCOPY Right 10/27/2022    Procedure: PYELOSCOPY;  Surgeon: Chirag Russ MD;  Location: [facility] OR 1ST FLR;  Service: Urology;  Laterality: Right;    REPLACEMENT OF NEPHROSTOMY TUBE Right 10/27/2022    Procedure: REPLACEMENT, NEPHROSTOMY TUBE;  Surgeon: Chirag Russ MD;  [facility]  Service: Urology;  Laterality: Right;    RETROPERITONEAL LYMPHADENECTOMY Right 9/17/2018    Procedure: LYMPHADENECTOMY, RETROPERITONEUM;  Surgeon: Sebas Reed MD;  Location: [facility] OR 2ND FLR;  Service: General;  Laterality: Right;  ILIAC    URETEROSCOPIC REMOVAL OF URETERIC CALCULUS Right 10/27/2022    Procedure: REMOVAL, CALCULUS, URETER, URETEROSCOPIC;  Surgeon: Chirag Russ MD;  Location: [facility] OR [facility] FLR;  Service: Urology;  Laterality: Right;    URETEROSCOPY Right 10/27/2022    Procedure: URETEROSCOPY-ANTEGRADE;  Surgeon: Chirag Russ MD;  Location: [facility] OR [facility] FLR;  Service: Urology;  Laterality: Right;       Review of patient's allergies indicates:   Allergen Reactions    Bee sting [allergen  ext-venom-honey bee]      Rash      Grass pollen-bermuda, standard      rash       Family History       Problem Relation (Age of Onset)    Breast cancer Maternal Aunt    Cancer Father, Mother    Cervical cancer Mother    Colon cancer Father    Drug abuse Daughter, Daughter    Esophageal cancer Father    Heart disease Sister, Maternal Grandmother    Suicide Daughter            Tobacco Use    Smoking status: Never    Smokeless tobacco: Never   Substance and Sexual Activity    Alcohol use: No     Alcohol/week: 0.0 standard drinks    Drug use: No    Sexual activity: Yes     Partners: Male     Birth control/protection: None     Comment:  19 years since 1999       Review of Systems   Constitutional:  Negative for chills and fever.   HENT:  Negative for sore throat and trouble swallowing.    Eyes:  Negative for pain and discharge.   Respiratory:  Negative for shortness of breath and wheezing.    Cardiovascular:  Negative for chest pain and palpitations.   Gastrointestinal:  Negative for abdominal pain, diarrhea, nausea and vomiting.   Genitourinary:         As per HPI   Musculoskeletal:  Negative for back pain and joint swelling.   Skin:  Negative for rash and wound.   Neurological:  Negative for seizures, weakness and headaches.   Psychiatric/Behavioral:  Negative for hallucinations. The patient is not nervous/anxious.      Objective:     Temp:  [97.9 °F (36.6 °C)] 97.9 °F (36.6 °C)  Pulse:  [73-93] 75  Resp:  [16-18] 16  SpO2:  [99 %-100 %] 100 %  BP: ()/(61-71) 117/64     Body mass index is 26.58 kg/m².           Drains       Drain  Duration                  Urostomy 09/11/20 0000 ileal conduit  days         Ileostomy 09/18/20  days         Nephrostomy 02/14/23 0914 Left 12 Fr. 15 days         Nephrostomy 02/14/23 0918 Right 12 Fr. 15 days                    Physical Exam  Vitals and nursing note reviewed.   Constitutional:       General: She is not in acute distress.  HENT:      Head:  Atraumatic.      Nose: Nose normal.   Eyes:      Extraocular Movements: Extraocular movements intact.   Cardiovascular:      Rate and Rhythm: Normal rate.   Pulmonary:      Effort: Pulmonary effort is normal.   Abdominal:      General: Abdomen is flat. There is no distension.      Tenderness: There is no abdominal tenderness. There is no right CVA tenderness or left CVA tenderness.      Comments: Right PCNT in place draining c/y/u. Left PCNT displaced with bandage covering incision site, c/d/I.   Musculoskeletal:         General: Normal range of motion.      Cervical back: Normal range of motion.   Skin:     Coloration: Skin is not jaundiced.   Neurological:      General: No focal deficit present.      Mental Status: She is alert and oriented to person, place, and time.   Psychiatric:         Mood and Affect: Mood normal.         Behavior: Behavior normal.       Significant Labs:    BMP:  No results for input(s): NA, K, CL, CO2, BUN, CREATININE, LABGLOM, GLUCOSE, CALCIUM in the last 168 hours.    CBC:  Recent Labs   Lab 03/01/23  1706   WBC 11.28   HGB 12.2   HCT 41.0          All pertinent labs results from the past 24 hours have been reviewed.    Significant Imaging:  All pertinent imaging results/findings from the past 24 hours have been reviewed.

## 2023-03-02 NOTE — PLAN OF CARE
Pt discharged to 523. Site CDI. VSS. All questions answered at this time. Pt transported via transport team in [facility].

## 2023-03-02 NOTE — DISCHARGE SUMMARY
[facility] - [facility]  Urology  Discharge Summary      Patient Name: Edita Riley  MRN: [id number]  Admission Date: 3/1/2023  Hospital Length of Stay: 0 days  Discharge Date and Time:  03/02/2023 12:15 PM  Attending Physician: Leslie Balbuena MD   Discharging Provider: James Guzman MD  Primary Care Physician: Primary Doctor No    HPI:   Edita Riley is a 58 y.o. female who is presents with a history of distal ureteral strcitures due to XRT for cervical cancer.  She underwent a transverse colon conduit on 9/11/2020.  The patient had bilateral hydronephrosis with reflux on the right side and a sluggish ureter on the left side.  This did not reflux.  She however, had kidney stones and was reverred to Dr. Russ for management.  She underwent a percutaneous antegrade approach on 10/27/2022.  Does not appear to have had a follow up scheduled.  She remains with bilateral percutaneous nephrostomy tubes.  She had a CT scan done on one of her ER visits on 12/2/2022 which showed a 2 mm stone in the right ureter and a 6 mm stone in the lower pole.  The calyces are delicately cupped.  The left was accidentally removed on 2/13/2023 and it was redone on 2/14/2023.  The right was replaced at the same time, per IR's note      Urology is consulted for accidental displacement of her left PCNT. At bedside she is AFVSS, in NAD, with stable labs. IR with plans to take her to the OR tomorrow for replacement of her left PCNT.      * No surgery found *     Indwelling Lines/Drains at time of discharge:   Lines/Drains/Airways       Drain  Duration                  Urostomy 09/11/20 0000 ileal conduit  days         Ileostomy 09/18/20  days         Nephrostomy 02/14/23 0918 Right 12 Fr. 16 days         Nephrostomy 02/14/23 Right 16 days         Nephrostomy 03/02/23 1056 Left 12 Fr. <1 day                    Hospital Course (synopsis of major diagnoses, care, treatment, and services provided during the  course of the hospital stay): Patient presented to [facility] ED from urology clinic after it was discovered that her left nephrostomy tube had become dislodged. IR was contacted prior to her arrival to facilitate nephrostomy tube replacement. The patient was admitted to the urology service and on 03/02/23 underwent left nephrostomy tube placement, which was uncomplicated. She has no other acute issues requiring further hospitalization and meets all criteria for discharge.     Goals of Care Treatment Preferences:  Code Status: Full Code      Consults:   Consults (From admission, onward)          Status Ordering Provider     Inpatient consult to Urology  Once        Provider:  (Not yet assigned)    Completed TOM TAYLOR     Inpatient consult to Interventional Radiology  Once        Provider:  (Not yet assigned)    Completed CARMENCITA AVILA            Significant Diagnostic Studies: none    Pending Diagnostic Studies:       Procedure Component Value Units Date/Time    IR Nephrostomy Tube Placement [[id number]] Resulted: 03/02/23 1032    Order Status: Sent Lab Status: In process Updated: 03/02/23 1113            Final Active Diagnoses:    Diagnosis Date Noted POA    PRINCIPAL PROBLEM:  Bilateral ureteral obstruction [N13.5] 09/11/2020 Yes      Problems Resolved During this Admission:         Discharged Condition: good    Disposition: Discharge home to self care.    Follow Up: In clinic with Dr. Balbuena after CTRSS.    Patient Instructions:   No discharge procedures on file.  Medications:  Reconciled Home Medications:      Medication List        START taking these medications      traMADoL 50 mg tablet  Commonly known as: ULTRAM  Take 1 tablet (50 mg total) by mouth every 6 (six) hours.            CONTINUE taking these medications      acetaminophen 500 MG tablet  Commonly known as: TYLENOL  Take 2 tablets (1,000 mg total) by mouth every 8 (eight) hours as needed for Pain.     gabapentin 100 MG capsule  Commonly known as:  NEURONTIN  Take 2 capsules (200 mg total) by mouth every evening.     ketorolac 10 mg tablet  Commonly known as: TORADOL  Take 1 tablet (10 mg total) by mouth every 6 (six) hours as needed for Pain.     melatonin 3 mg tablet  Commonly known as: MELATIN  Take 2 tablets (6 mg total) by mouth nightly as needed for Insomnia.     mirtazapine 15 MG disintegrating tablet  Commonly known as: REMERON SOL-TAB  Dissolve 1 tablet (15 mg total) by mouth nightly.     sodium bicarbonate 650 MG tablet  Take 2 tablets (1,300 mg total) by mouth 2 (two) times daily.     sucralfate 1 gram tablet  Commonly known as: CARAFATE  Take 1 tablet (1 g total) by mouth 4 (four) times daily before meals and nightly.              Time spent on the discharge of patient: 15 minutes    James Guzman MD  Urology  [facility] - Surgery    As above.

## 2023-03-02 NOTE — ASSESSMENT & PLAN NOTE
Edita Riley is a 58 y.o. female with b/l obstructive uropathy managed by chronic b/l PCNT, now with displacement of her left PCNT.  - to OR today for IR PCNT placement   - NPO at midnight  - Strict I's/O's  - Trend Cr, avoid nephrotoxic agents, and dose medications to patient's current GFR  - Resume all home meds  - MM pain control   - mIVF

## 2023-03-02 NOTE — PLAN OF CARE
[facility] - [facility]  Initial Discharge Assessment       Primary Care Provider: Primary Doctor No    Admission Diagnosis: Nephrostomy complication [N99.528]    Admission Date: 3/1/2023  Expected Discharge Date: 3/3/2023    Discharge Barriers Identified: None    Payor: MEDICAID / Plan: [employer] [facility] ([employer]) / Product Type: Managed Medicaid /     Extended Emergency Contact Information  Primary Emergency Contact: Hammad Riley  Address: [address] [place] [address] [id number] [place] [address]  Home Phone: [phone]  Relation: Spouse  Secondary Emergency Contact: Federico Riley  Mobile Phone: [phone]  Relation: Daughter    Discharge Plan A: Home with family, Other         [id number] - [place], [place] - [address]  [address]  [place] [address]  Phone: [phone] Fax: [phone]    [id number] - [place] - [address]  [address]  [place] [address]  Phone: [phone] Fax: [phone]    [facility]  [address]  [facility] [address]  Phone: [phone] Fax: [phone]      Initial Assessment (most recent)       Adult Discharge Assessment - 03/02/23 0939          Discharge Assessment    Assessment Type Discharge Planning Assessment     Confirmed/corrected address, phone number and insurance Yes     Confirmed Demographics Correct on Facesheet     Source of Information patient     Communicated OK with patient/caregiver Yes     People in Home spouse;child(abigail), adult     Do you expect to return to your current living situation? Yes     Do you have help at home or someone to help you manage your care at home? Yes     Prior to hospitilization cognitive status: Alert/Oriented     Current cognitive status: Alert/Oriented     Home Accessibility wheelchair accessible     Home Layout Able to live on 1st floor     Equipment Currently Used at Home walker, rolling     Readmission  within 30 days? No     Patient currently being followed by outpatient case management? No     Do you currently have service(s) that help you manage your care at home? No     Do you take prescription medications? Yes     Do you have prescription coverage? Yes     Is the patient taking medications as prescribed? yes     How do you get to doctors appointments? family or friend will provide     Are you on dialysis? No     Do you take coumadin? No     Discharge Plan A Home with family;Other     DME Needed Upon Discharge  none     Discharge Plan discussed with: Patient     Discharge Barriers Identified None                         SW completed discharge planning assessment with the patient at bedside. SW verified demographic information listed on the pt.'s Face sheet. Pt reports living with her spouse and adult daughter, whom she plans to help mange her care upon d/c. Pt's spouse and daughter currently provides grocery shopping and prepares the meals.SW is following this Pt for DC planning needs. There are no identified needs at this time.      Kira Dawkins LMSW  Case Management   [facility]   Ext. [phone]

## 2023-03-02 NOTE — PLAN OF CARE
Problem: Adult Inpatient Plan of Care  Goal: Plan of Care Review  Outcome: Ongoing, Progressing  Goal: Patient-Specific Goal (Individualized)  Outcome: Ongoing, Progressing  Goal: Absence of Hospital-Acquired Illness or Injury  Outcome: Ongoing, Progressing  Intervention: Identify and Manage Fall Risk  Flowsheets (Taken 3/2/2023 0341)  Safety Promotion/Fall Prevention:   assistive device/personal item within reach   Fall Risk reviewed with patient/family   medications reviewed   lighting adjusted   side rails raised x 2  Intervention: Prevent Skin Injury  Flowsheets (Taken 3/2/2023 0341)  Body Position: position changed independently  Skin Protection:   adhesive use limited   protective footwear used  Intervention: Prevent and Manage VTE (Venous Thromboembolism) Risk  Flowsheets (Taken 3/2/2023 0341)  Activity Management:   Ambulated -L4   Ambulated in room - L4  VTE Prevention/Management:   remove, assess skin, and reapply sequential compression device   intravenous hydration  Range of Motion: active ROM (range of motion) encouraged  Intervention: Prevent Infection  Flowsheets (Taken 3/2/2023 0341)  Infection Prevention:   hand hygiene promoted   single patient room provided   environmental surveillance performed   personal protective equipment utilized   equipment surfaces disinfected   rest/sleep promoted  Goal: Optimal Comfort and Wellbeing  Outcome: Ongoing, Progressing  Intervention: Monitor Pain and Promote Comfort  Flowsheets (Taken 3/2/2023 0341)  Pain Management Interventions:   medication offered   position adjusted   quiet environment facilitated  Intervention: Provide Person-Centered Care  Flowsheets (Taken 3/2/2023 0341)  Trust Relationship/Rapport:   care explained   questions answered   choices provided   questions encouraged   emotional support provided   reassurance provided   empathic listening provided   thoughts/feelings acknowledged

## 2023-03-02 NOTE — PLAN OF CARE
Lt neph tube replacment completed, pt tolerated well. No apparent distress noted. Dressing applied CDI. Pt to be transfer to [facility]PU for 1 hour recovery, per Dr Ascencio. Report to be given at bedside.     Report called to AMY Villalobos.

## 2023-03-02 NOTE — PROGRESS NOTES
[facility]  Urology  Progress Note    Patient Name: Edita Riley  MRN: [id number]  Admission Date: 3/1/2023  Hospital Length of Stay: 0 days  Code Status: Full Code   Attending Provider: Leslie Balbuena MD   Primary Care Physician: Primary Doctor No    Subjective:     HPI:  Edita Riley is a 58 y.o. female who is presents with a history of distal ureteral strcitures due to XRT for cervical cancer.  She underwent a transverse colon conduit on 9/11/2020.  The patient had bilateral hydronephrosis with reflux on the right side and a sluggish ureter on the left side.  This did not reflux.  She however, had kidney stones and was reverred to Dr. Russ for management.  She underwent a percutaneous antegrade approach on 10/27/2022.  Does not appear to have had a follow up scheduled.  She remains with bilateral percutaneous nephrostomy tubes.  She had a CT scan done on one of her ER visits on 12/2/2022 which showed a 2 mm stone in the right ureter and a 6 mm stone in the lower pole.  The calyces are delicately cupped.  The left was accidentally removed on 2/13/2023 and it was redone on 2/14/2023.  The right was replaced at the same time, per IR's note      Urology is consulted for accidental displacement of her left PCNT. At bedside she is AFVSS, in NAD, with stable labs. IR with plans to take her to the OR tomorrow for replacement of her left PCNT.      Interval History: NAEO.  AFVSS.  Cr 1.2 from 1.4.  Resting comfortably in bed.  Endorsing some L flank pain.  To OR today for L PCNT replacement.      R NT: -/300/350   Ileal conduit: -/-/400    Review of Systems  Objective:     Temp:  [97.6 °F (36.4 °C)-97.9 °F (36.6 °C)] 97.6 °F (36.4 °C)  Pulse:  [69-93] 72  Resp:  [16-18] 18  SpO2:  [97 %-100 %] 97 %  BP: ()/(54-71) 116/64     Body mass index is 26.58 kg/m².           Drains       Drain  Duration                  Urostomy 09/11/20 0000 ileal conduit  days         Ileostomy  09/18/20  days         Nephrostomy 02/14/23 Right 16 days         Nephrostomy 02/14/23 0918 Right 12 Fr. 15 days                    Physical Exam  Vitals and nursing note reviewed.   Constitutional:       General: She is not in acute distress.  HENT:      Head: Atraumatic.      Nose: Nose normal.   Eyes:      Extraocular Movements: Extraocular movements intact.   Cardiovascular:      Rate and Rhythm: Normal rate.   Pulmonary:      Effort: Pulmonary effort is normal.   Abdominal:      General: Abdomen is flat. There is no distension.      Tenderness: There is no abdominal tenderness. There is no right CVA tenderness or left CVA tenderness.      Comments: Right PCNT in place draining c/y/u. Left PCNT displaced with bandage covering incision site, c/d/I.   Musculoskeletal:         General: Normal range of motion.      Cervical back: Normal range of motion.   Skin:     Coloration: Skin is not jaundiced.   Neurological:      General: No focal deficit present.      Mental Status: She is alert and oriented to person, place, and time.   Psychiatric:         Mood and Affect: Mood normal.         Behavior: Behavior normal.       Significant Labs:    BMP:  Recent Labs   Lab 03/01/23 1948 03/02/23 0355    141   K 4.2 3.8    113*   CO2 26 24   BUN 18 15   CREATININE 1.4 1.2   CALCIUM 9.7 8.9       CBC:   Recent Labs   Lab 03/01/23  1706 03/02/23 0355   WBC 11.28 8.45   HGB 12.2 10.8*   HCT 41.0 37.3    246       All pertinent labs results from the past 24 hours have been reviewed.  Recent Lab Results         03/02/23  0355   03/01/23 1948 03/01/23  1706        Albumin   3.4         Alkaline Phosphatase   104         ALT   9         Anion Gap 4   6         AST   13         Baso # 0.04     0.04       Basophil % 0.5     0.4       BILIRUBIN TOTAL   0.5  Comment: For infants and newborns, interpretation of results should be based  on gestational age, weight and in agreement with  clinical  observations.    Premature Infant recommended reference ranges:  Up to 24 hours.............<8.0 mg/dL  Up to 48 hours............<12.0 mg/dL  3-5 days..................<15.0 mg/dL  6-29 days.................<15.0 mg/dL           BUN 15   18         Calcium 8.9   9.7         Chloride 113   110         CO2 24   26         Creatinine 1.2   1.4         Differential Method Automated     Automated       eGFR 51.8   43.1         Eos # 0.3     0.1       Eosinophil % 3.2     1.2       Glucose 82   81         Gran # (ANC) 5.1     8.2       Gran % 59.8     72.8       Hematocrit 37.3     41.0       Hemoglobin 10.8     12.2       Immature Grans (Abs) 0.03  Comment: Mild elevation in immature granulocytes is non specific and   can be seen in a variety of conditions including stress response,   acute inflammation, trauma and pregnancy. Correlation with other   laboratory and clinical findings is essential.       0.03  Comment: Mild elevation in immature granulocytes is non specific and   can be seen in a variety of conditions including stress response,   acute inflammation, trauma and pregnancy. Correlation with other   laboratory and clinical findings is essential.         Immature Granulocytes 0.4     0.3       INR     1.0  Comment: Coumadin Therapy:  2.0 - 3.0 for INR for all indicators except mechanical heart valves  and antiphospholipid syndromes which should use 2.5 - 3.5.         Lymph # 1.7     1.6       Lymph % 20.4     14.3       MCH 26.5     27.3       MCHC 29.0     29.8       MCV 92     92       Mono # 1.3     1.2       Mono % 15.7     11.0       MPV 10.0     11.1       nRBC 0     0       Platelets 246     199       Potassium 3.8   4.2         PROTEIN TOTAL   7.2         Protime     10.4       RBC 4.07     4.47       RDW 15.2     16.7       Sodium 141   142         WBC 8.45     11.28               Significant Imaging:  All pertinent imaging results/findings from the past 24 hours have been  reviewed.                    Assessment/Plan:     * Bilateral ureteral obstruction  Edita Riley is a 58 y.o. female with b/l obstructive uropathy managed by chronic b/l PCNT, now with displacement of her left PCNT.  - to OR today for IR PCNT placement   - NPO at midnight  - Strict I's/O's  - Trend Cr, avoid nephrotoxic agents, and dose medications to patient's current GFR  - Cr 1.2 from 1.4, other labs WNL   - Resume all home meds  - MM pain control   - mIVF          VTE Risk Mitigation (From admission, onward)         Ordered     IP VTE HIGH RISK PATIENT  Once         03/01/23 1759     Place ANANT hose  Until discontinued         03/01/23 1759     Place sequential compression device  Until discontinued         03/01/23 1759                Maryann Darnell MD  Urology  [facility] - Surgery

## 2023-03-02 NOTE — PLAN OF CARE
[facility]  Discharge Final Note    Primary Care Provider: Primary Doctor No    Expected Discharge Date: 3/2/2023    Final Discharge Note (most recent)       Final Note - 03/02/23 1420          Final Note    Assessment Type Final Discharge Note     Anticipated Discharge Disposition Home or Self Care     What phone number can be called within the next 1-3 days to see how you are doing after discharge? --   [phone]    Hospital Resources/Appts/Education Provided Appointments scheduled and added to AVS                     Important Message from Medicare           Future Appointments   Date Time Provider Department Center   3/3/2023  7:00 AM [facility]-CT2 500 LB LIMIT [facility]   3/28/2023  9:00 AM [facility] [id number] [facility] RAD IR [facility]     Patient discharged home to care of family on 3/2/23.    Sammie Gomez RNCM  Case Management  [facility]  [phone]

## 2023-03-02 NOTE — H&P
Inpatient Radiology Pre-procedure Note    History of Present Illness:  Edita Riley is a 58 y.o. female who presents for left percutaneous nephrostomy tube replacement after it was dislodged yesterday. Pt with PMH notable for distal ureteral strcitures due to XRT for cervical cancer s/p  transverse colon conduit on 9/11/2020 then bilateral hydronephrosis with reflux on the right side and a sluggish ureter on the left side managed with bilateral neph tubes.      Admission H&P reviewed.  Past Medical History:   Diagnosis Date    Abnormal mammogram 08/25/2020    Acute blood loss anemia     Acute deep vein thrombosis (DVT) of lower extremity 12/09/2020    Advance care planning 04/30/2021    Anemia due to chronic blood loss     Anemia due to chronic kidney disease     Anxiety     Bilateral ureteral obstruction 9/11/2020    Cardiovascular event risk -- low 09/14/2015    ASCVD 10-year risk 1.9% (with optimal risk factors 1.3%) as of 9/14/2015     Cervical cancer 2014    Chronic back pain     Colostomy care     Deep vein thrombosis     Depression     Diarrhea due to malabsorption 11/14/2018    Diarrhea due to malabsorption 11/14/2018    Difficult intubation     Disorder of kidney and ureter     DVT of lower extremity, bilateral 11/04/2020    Fibromyalgia     Fungemia 09/27/2020    Generalized abdominal pain 08/25/2020    GERD (gastroesophageal reflux disease)     Hemifacial spasm 09/16/2015    Hiatal hernia 2014    History of cervical cancer 10/11/2018    Hx of psychiatric care     Cymbalta, trazodone    Hypertension     Hypomagnesemia 11/21/2018    Lactose intolerance     Metastatic squamous cell carcinoma to lymph node 10/11/2018    Nephrostomy tube displaced     Neuropathy due to chemotherapeutic drug 11/14/2018    Osteoarthritis of back     Peritonitis 09/22/2020    Pseudomonas urinary tract infection 04/21/2021    Psychiatric problem     Refusal of blood transfusions as patient is Zoroastrianism      Schatzki's ring 09/14/2015    Seen on outside EGD 05/2014, underwent esophageal dilatation. Bx were negative.     Seizures     Sleep stage dysfunction     Noted on PSG 06/2017; negative for obstructive sleep apnea     Stroke     Urinary tract infection associated with nephrostomy catheter 06/11/2020    Wound infection after surgery 09/24/2020     Past Surgical History:   Procedure Laterality Date    ANTEGRADE NEPHROSTOGRAPHY Bilateral 9/28/2020    Procedure: Nephrostogram - antegrade;  Surgeon: Leslie Balbuena MD;  Location: [facility] OR 1ST FLR;  Service: Urology;  Laterality: Bilateral;    ANTEGRADE NEPHROSTOGRAPHY Left 4/20/2021    Procedure: NEPHROSTOGRAM, ANTEGRADE;  Surgeon: Leslie Balbuena MD;  [facility];  Service: Urology;  Laterality: Left;    ANTEGRADE NEPHROSTOGRAPHY Right 10/27/2022    Procedure: NEPHROSTOGRAM, ANTEGRADE;  Surgeon: Chirag Russ MD;  [facility];  Service: Urology;  Laterality: Right;    BILATERAL OOPHORECTOMY  2015    CHOLECYSTECTOMY  11/09/2016    Done at [facility], path showed chronic cholecystitis and gallstones    cold knife conization  2014    COLECTOMY, RIGHT  9/17/2020    Procedure: COLECTOMY, RIGHT Extended;  Surgeon: Hammad Reynolds MD;  Location: [facility] OR 2ND FLR;  Service: Colon and Rectal;;    COLONOSCOPY  2014    COLONOSCOPY N/A 10/24/2016    at [facility], Dr Gutiérrez    COLONOSCOPY N/A 5/18/2018    Procedure: COLONOSCOPY;  Surgeon: Arden Gutiérrez MD;  Location: [facility] (4TH FLR);  Service: Endoscopy;  Laterality: N/A;    COLONOSCOPY N/A 7/28/2020    Procedure: COLONOSCOPY;  Surgeon: Hammad Reynolds MD;  Location: [facility] ENDO (4TH FLR);  Service: Colon and Rectal;  Laterality: N/A;  [facility]    CYSTOSCOPY WITH URETEROSCOPY, RETROGRADE PYELOGRAPHY, AND INSERTION OF STENT Bilateral 3/21/2020    Procedure: CYSTOSCOPY, WITH RETROGRADE PYELOGRAM,;  Surgeon: Leslie Balbuena MD;  Location: [facility] OR 1ST FLR;  Service: Urology;   Laterality: Bilateral;    DILATION OF NEPHROSTOMY TRACT Right 10/27/2022    Procedure: DILATION, NEPHROSTOMY TRACT;  Surgeon: Chirag Russ MD;  Location: [facility] OR 1ST FLR;  Service: Urology;  Laterality: Right;    ESOPHAGOGASTRODUODENOSCOPY  2014    ESOPHAGOGASTRODUODENOSCOPY  11/18/2020    ESOPHAGOGASTRODUODENOSCOPY N/A 11/18/2020    Procedure: ESOPHAGOGASTRODUODENOSCOPY (EGD);  Surgeon: Zenon Spencer MD;  Location: [facility] ENDO;  Service: Endoscopy;  Laterality: N/A;    ESOPHAGOGASTRODUODENOSCOPY N/A 12/11/2020    Procedure: EGD (ESOPHAGOGASTRODUODENOSCOPY);  Surgeon: Juancho Muse MD;  Location: [facility] ENDO (2ND FLR);  Service: Endoscopy;  Laterality: N/A;    HYSTERECTOMY  2014    [facility] for cervical [facility]    ILEOSTOMY  9/17/2020    Procedure: CREATION, ILEOSTOMY;  Surgeon: Hammad Reynolds MD;  Location: [facility] OR 2ND FLR;  Service: Colon and Rectal;;    LOOPOGRAM N/A 4/20/2021    Procedure: LOOPOGRAM;  Surgeon: Leslie Balbuena MD;  Location: [facility] OR 1ST FLR;  Service: Urology;  Laterality: N/A;    MOBILIZATION OF SPLENIC FLEXURE N/A 9/11/2020    Procedure: MOBILIZATION, COLONIC;  Surgeon: Hammad Reynolds MD;  Location: [facility] OR 2ND FLR;  Service: Colon and Rectal;  Laterality: N/A;    NEPHROSTOGRAPHY Bilateral 4/17/2021    Procedure: Nephrostogram;  Surgeon: Celeste Surgeon;  [facility];  Service: Anesthesiology;  Laterality: Bilateral;    OOPHORECTOMY      PYELOSCOPY Right 10/27/2022    Procedure: PYELOSCOPY;  Surgeon: Chirag Russ MD;  Location: [facility] OR 1ST FLR;  Service: Urology;  Laterality: Right;    REPLACEMENT OF NEPHROSTOMY TUBE Right 10/27/2022    Procedure: REPLACEMENT, NEPHROSTOMY TUBE;  Surgeon: Chirag Russ MD;  Location: [facility] OR 1ST FLR;  Service: Urology;  Laterality: Right;    RETROPERITONEAL LYMPHADENECTOMY Right 9/17/2018    Procedure: LYMPHADENECTOMY, RETROPERITONEUM;  Surgeon: Sebas Reed MD;  Location: [facility] OR 2ND FLR;  Service: General;  Laterality: Right;   ILIAC    URETEROSCOPIC REMOVAL OF URETERIC CALCULUS Right 10/27/2022    Procedure: REMOVAL, CALCULUS, URETER, URETEROSCOPIC;  Surgeon: Chirag Russ MD;  Location: [facility] OR [address] [place] [address];  Service: Urology;  Laterality: Right;    URETEROSCOPY Right 10/27/2022    Procedure: URETEROSCOPY-ANTEGRADE;  Surgeon: Chirag Russ MD;  Location: [facility] OR [address] [place] [address];  Service: Urology;  Laterality: Right;       Review of Systems:   As documented in primary team H&P    Home Meds:   Prior to Admission medications    Medication Sig Start Date End Date Taking? Authorizing Provider   acetaminophen (TYLENOL) 500 MG tablet Take 2 tablets (1,000 mg total) by mouth every 8 (eight) hours as needed for Pain. 2/18/23 3/5/23  Fransisca Martinez MD   gabapentin (NEURONTIN) 100 MG capsule Take 2 capsules (200 mg total) by mouth every evening. 9/1/22   Danuta Barboza MD   ketorolac (TORADOL) 10 mg tablet Take 1 tablet (10 mg total) by mouth every 6 (six) hours as needed for Pain. 9/8/22   Denise Brooks NP   melatonin (MELATIN) 3 mg tablet Take 2 tablets (6 mg total) by mouth nightly as needed for Insomnia. 9/6/21   Kacey Bergeron MD   mirtazapine (REMERON SOL-TAB) 15 MG disintegrating tablet Dissolve 1 tablet (15 mg total) by mouth nightly. 2/15/22 2/15/23  Diony Ram MD   sodium bicarbonate 650 MG tablet Take 2 tablets (1,300 mg total) by mouth 2 (two) times daily. 2/15/22 2/15/23  Diony Ram MD   sucralfate (CARAFATE) 1 gram tablet Take 1 tablet (1 g total) by mouth 4 (four) times daily before meals and nightly. 9/1/22   Danuta Barboza MD     Scheduled Meds:    gabapentin  200 mg Oral QHS    mirtazapine  15 mg Oral Nightly     Continuous Infusions:    lactated ringers 100 mL/hr at 03/02/23 0710     PRN Meds:acetaminophen, melatonin, ondansetron, oxyCODONE, oxyCODONE, sodium chloride 0.9%  Anticoagulants/Antiplatelets: no anticoagulation    Allergies:   Review of patient's allergies indicates:   Allergen Reactions     Bee sting [allergen ext-venom-honey bee]      Rash      Grass pollen-bermuda, standard      rash     Sedation Hx: have not been any systemic reactions    Labs:  Recent Labs   Lab 03/01/23  1706   INR 1.0       Recent Labs   Lab 03/02/23  0355   WBC 8.45   HGB 10.8*   HCT 37.3   MCV 92         Recent Labs   Lab 03/01/23  1948 03/02/23  0355   GLU 81 82    141   K 4.2 3.8    113*   CO2 26 24   BUN 18 15   CREATININE 1.4 1.2   CALCIUM 9.7 8.9   ALT 9*  --    AST 13  --    ALBUMIN 3.4*  --    BILITOT 0.5  --          Vitals:  Temp: 97.6 °F (36.4 °C) (03/02/23 0508)  Pulse: 72 (03/02/23 0508)  Resp: 18 (03/02/23 0508)  BP: 116/64 (03/02/23 0508)  SpO2: 97 % (03/02/23 0508)     Physical Exam:  ASA: 3  Mallampati: 3    General: no acute distress  Mental Status: alert and oriented to person, place and time  HEENT: normocephalic, atraumatic  Chest: unlabored breathing  Heart: regular heart rate  Abdomen: nondistended  Extremity: moves all extremities    Plan: Left nephrostomy tube replacement   Sedation Plan: moderate    Alanis Ron MD  PGY-5  Pager: [phone]

## 2023-03-02 NOTE — HPI
Edita Riley is a 58 y.o. female who is presents with a history of distal ureteral strcitures due to XRT for cervical cancer.  She underwent a transverse colon conduit on 9/11/2020.  The patient had bilateral hydronephrosis with reflux on the right side and a sluggish ureter on the left side.  This did not reflux.  She however, had kidney stones and was reverred to Dr. Russ for management.  She underwent a percutaneous antegrade approach on 10/27/2022.  Does not appear to have had a follow up scheduled.  She remains with bilateral percutaneous nephrostomy tubes.  She had a CT scan done on one of her ER visits on 12/2/2022 which showed a 2 mm stone in the right ureter and a 6 mm stone in the lower pole.  The calyces are delicately cupped.  The left was accidentally removed on 2/13/2023 and it was redone on 2/14/2023.  The right was replaced at the same time, per IR's note      Urology is consulted for accidental displacement of her left PCNT. At bedside she is AFVSS, in NAD, with stable labs. IR with plans to take her to the OR tomorrow for replacement of her left PCNT.

## 2023-03-02 NOTE — ED NOTES
Bed: 10  Expected date: 3/1/23  Expected time:   Means of arrival:   Comments:  Pt. Still in room

## 2023-03-02 NOTE — ASSESSMENT & PLAN NOTE
Edita Riley is a 58 y.o. female with b/l obstructive uropathy managed by chronic b/l PCNT, now with displacement of her left PCNT.    - Consult d/w Staff Urologist  - Strict I's/O's  - Trend Cr, avoid nephrotoxic agents, and dose medications to patient's current GFR  - Will admit to urology obs for IR procedure tomorrow  - NPO @[place]  - Resume all home meds  - mIVF

## 2023-03-02 NOTE — NURSING
Patient's peripheral lines were removed, and patient has received her prescription and discharge paperwork. No acute distress noted. Patient transported to the exit via wheelchair by staff.

## 2023-03-03 ENCOUNTER — HOSPITAL ENCOUNTER (OUTPATIENT)
Dept: RADIOLOGY | Facility: HOSPITAL | Age: 58
Discharge: HOME | End: 2023-03-03
Attending: UROLOGY
Payer: MEDICAID

## 2023-03-03 DIAGNOSIS — N20.1 RIGHT URETERAL CALCULUS: ICD-10-CM

## 2023-03-03 PROCEDURE — 74176 CT ABD & PELVIS W/O CONTRAST: CPT | Mod: 26,,, | Performed by: RADIOLOGY

## 2023-03-03 PROCEDURE — 74176 CT RENAL STONE STUDY ABD PELVIS WO: ICD-10-PCS | Mod: 26,,, | Performed by: RADIOLOGY

## 2023-03-03 PROCEDURE — 74176 CT ABD & PELVIS W/O CONTRAST: CPT | Mod: TC

## 2023-03-22 ENCOUNTER — HOSPITAL ENCOUNTER (EMERGENCY)
Facility: HOSPITAL | Age: 58
Discharge: HOME | End: 2023-03-23
Attending: EMERGENCY MEDICINE
Payer: MEDICAID

## 2023-03-22 VITALS
OXYGEN SATURATION: 100 % | TEMPERATURE: 98 F | HEART RATE: 98 BPM | DIASTOLIC BLOOD PRESSURE: 61 MMHG | RESPIRATION RATE: 14 BRPM | SYSTOLIC BLOOD PRESSURE: 111 MMHG

## 2023-03-22 DIAGNOSIS — N13.30 HYDRONEPHROSIS, UNSPECIFIED HYDRONEPHROSIS TYPE: ICD-10-CM

## 2023-03-22 DIAGNOSIS — R10.9 ABDOMINAL PAIN, UNSPECIFIED ABDOMINAL LOCATION: Primary | ICD-10-CM

## 2023-03-22 LAB
ALBUMIN SERPL BCP-MCNC: 3.1 G/DL (ref 3.5–5.2)
ALP SERPL-CCNC: 111 U/L (ref 55–135)
ALT SERPL W/O P-5'-P-CCNC: 12 U/L (ref 10–44)
ANION GAP SERPL CALC-SCNC: 10 MMOL/L (ref 8–16)
AST SERPL-CCNC: 18 U/L (ref 10–40)
BACTERIA #/AREA URNS AUTO: ABNORMAL /HPF
BASOPHILS # BLD AUTO: 0.04 K/UL (ref 0–0.2)
BASOPHILS NFR BLD: 0.4 % (ref 0–1.9)
BILIRUB SERPL-MCNC: 0.2 MG/DL (ref 0.1–1)
BILIRUB UR QL STRIP: NEGATIVE
BUN SERPL-MCNC: 11 MG/DL (ref 6–20)
CALCIUM SERPL-MCNC: 8.5 MG/DL (ref 8.7–10.5)
CHLORIDE SERPL-SCNC: 112 MMOL/L (ref 95–110)
CLARITY UR REFRACT.AUTO: ABNORMAL
CO2 SERPL-SCNC: 23 MMOL/L (ref 23–29)
COLOR UR AUTO: ABNORMAL
CREAT SERPL-MCNC: 1.1 MG/DL (ref 0.5–1.4)
DIFFERENTIAL METHOD: ABNORMAL
EOSINOPHIL # BLD AUTO: 0.1 K/UL (ref 0–0.5)
EOSINOPHIL NFR BLD: 0.9 % (ref 0–8)
ERYTHROCYTE [DISTWIDTH] IN BLOOD BY AUTOMATED COUNT: 14.8 % (ref 11.5–14.5)
EST. GFR  (NO RACE VARIABLE): 57.5 ML/MIN/1.73 M^2
GLUCOSE SERPL-MCNC: 97 MG/DL (ref 70–110)
GLUCOSE UR QL STRIP: NEGATIVE
HCT VFR BLD AUTO: 36.1 % (ref 37–48.5)
HGB BLD-MCNC: 10.4 G/DL (ref 12–16)
HGB UR QL STRIP: ABNORMAL
HYALINE CASTS UR QL AUTO: 0 /LPF
IMM GRANULOCYTES # BLD AUTO: 0.02 K/UL (ref 0–0.04)
IMM GRANULOCYTES NFR BLD AUTO: 0.2 % (ref 0–0.5)
KETONES UR QL STRIP: NEGATIVE
LEUKOCYTE ESTERASE UR QL STRIP: ABNORMAL
LIPASE SERPL-CCNC: 16 U/L (ref 4–60)
LYMPHOCYTES # BLD AUTO: 1.3 K/UL (ref 1–4.8)
LYMPHOCYTES NFR BLD: 13 % (ref 18–48)
MCH RBC QN AUTO: 26.6 PG (ref 27–31)
MCHC RBC AUTO-ENTMCNC: 28.8 G/DL (ref 32–36)
MCV RBC AUTO: 92 FL (ref 82–98)
MICROSCOPIC COMMENT: ABNORMAL
MONOCYTES # BLD AUTO: 0.6 K/UL (ref 0.3–1)
MONOCYTES NFR BLD: 5.8 % (ref 4–15)
NEUTROPHILS # BLD AUTO: 8.2 K/UL (ref 1.8–7.7)
NEUTROPHILS NFR BLD: 79.7 % (ref 38–73)
NITRITE UR QL STRIP: POSITIVE
NRBC BLD-RTO: 0 /100 WBC
PH UR STRIP: 6 [PH] (ref 5–8)
PLATELET # BLD AUTO: 232 K/UL (ref 150–450)
PMV BLD AUTO: 10.1 FL (ref 9.2–12.9)
POTASSIUM SERPL-SCNC: 4.2 MMOL/L (ref 3.5–5.1)
PROT SERPL-MCNC: 6.8 G/DL (ref 6–8.4)
PROT UR QL STRIP: ABNORMAL
RBC # BLD AUTO: 3.91 M/UL (ref 4–5.4)
RBC #/AREA URNS AUTO: >100 /HPF (ref 0–4)
SODIUM SERPL-SCNC: 145 MMOL/L (ref 136–145)
SP GR UR STRIP: 1.02 (ref 1–1.03)
URN SPEC COLLECT METH UR: ABNORMAL
WBC # BLD AUTO: 10.33 K/UL (ref 3.9–12.7)
WBC #/AREA URNS AUTO: >100 /HPF (ref 0–5)

## 2023-03-22 PROCEDURE — 96365 THER/PROPH/DIAG IV INF INIT: CPT

## 2023-03-22 PROCEDURE — 99284 EMERGENCY DEPT VISIT MOD MDM: CPT | Mod: ,,, | Performed by: EMERGENCY MEDICINE

## 2023-03-22 PROCEDURE — 25500020 PHARM REV CODE 255: Performed by: EMERGENCY MEDICINE

## 2023-03-22 PROCEDURE — 83690 ASSAY OF LIPASE: CPT | Performed by: EMERGENCY MEDICINE

## 2023-03-22 PROCEDURE — 63600175 PHARM REV CODE 636 W HCPCS: Performed by: EMERGENCY MEDICINE

## 2023-03-22 PROCEDURE — 96361 HYDRATE IV INFUSION ADD-ON: CPT

## 2023-03-22 PROCEDURE — 85025 COMPLETE CBC W/AUTO DIFF WBC: CPT | Performed by: EMERGENCY MEDICINE

## 2023-03-22 PROCEDURE — 81001 URINALYSIS AUTO W/SCOPE: CPT | Performed by: EMERGENCY MEDICINE

## 2023-03-22 PROCEDURE — 96375 TX/PRO/DX INJ NEW DRUG ADDON: CPT

## 2023-03-22 PROCEDURE — 25000003 PHARM REV CODE 250: Performed by: EMERGENCY MEDICINE

## 2023-03-22 PROCEDURE — 80053 COMPREHEN METABOLIC PANEL: CPT | Performed by: EMERGENCY MEDICINE

## 2023-03-22 PROCEDURE — 99284 PR EMERGENCY DEPT VISIT,LEVEL IV: ICD-10-PCS | Mod: ,,, | Performed by: EMERGENCY MEDICINE

## 2023-03-22 PROCEDURE — 87086 URINE CULTURE/COLONY COUNT: CPT | Performed by: EMERGENCY MEDICINE

## 2023-03-22 PROCEDURE — 99285 EMERGENCY DEPT VISIT HI MDM: CPT | Mod: 25

## 2023-03-22 RX ORDER — ONDANSETRON 2 MG/ML
4 INJECTION INTRAMUSCULAR; INTRAVENOUS
Status: COMPLETED | OUTPATIENT
Start: 2023-03-22 | End: 2023-03-22

## 2023-03-22 RX ORDER — MORPHINE SULFATE 4 MG/ML
4 INJECTION, SOLUTION INTRAMUSCULAR; INTRAVENOUS
Status: COMPLETED | OUTPATIENT
Start: 2023-03-22 | End: 2023-03-22

## 2023-03-22 RX ADMIN — ONDANSETRON 4 MG: 2 INJECTION INTRAMUSCULAR; INTRAVENOUS at 09:03

## 2023-03-22 RX ADMIN — MORPHINE SULFATE 4 MG: 4 INJECTION INTRAVENOUS at 09:03

## 2023-03-22 RX ADMIN — SODIUM CHLORIDE 1000 ML: 9 INJECTION, SOLUTION INTRAVENOUS at 09:03

## 2023-03-22 RX ADMIN — IOHEXOL 100 ML: 350 INJECTION, SOLUTION INTRAVENOUS at 10:03

## 2023-03-23 LAB
BACTERIA UR CULT: NORMAL
BACTERIA UR CULT: NORMAL
BUN SERPL-MCNC: 12 MG/DL (ref 6–30)
CHLORIDE SERPL-SCNC: 112 MMOL/L (ref 95–110)
CREAT SERPL-MCNC: 1.1 MG/DL (ref 0.5–1.4)
GLUCOSE SERPL-MCNC: 104 MG/DL (ref 70–110)
HCT VFR BLD CALC: 36 %PCV (ref 36–54)
POC IONIZED CALCIUM: 1.01 MMOL/L (ref 1.06–1.42)
POC TCO2 (MEASURED): 24 MMOL/L (ref 23–29)
POTASSIUM BLD-SCNC: 4.1 MMOL/L (ref 3.5–5.1)
SAMPLE: ABNORMAL
SODIUM BLD-SCNC: 144 MMOL/L (ref 136–145)

## 2023-03-23 PROCEDURE — 96365 THER/PROPH/DIAG IV INF INIT: CPT

## 2023-03-23 PROCEDURE — 63600175 PHARM REV CODE 636 W HCPCS: Performed by: EMERGENCY MEDICINE

## 2023-03-23 RX ORDER — ONDANSETRON 4 MG/1
4 TABLET, FILM COATED ORAL EVERY 8 HOURS PRN
Qty: 12 TABLET | Refills: 0 | Status: SHIPPED | OUTPATIENT
Start: 2023-03-23 | End: 2023-03-26

## 2023-03-23 RX ORDER — CIPROFLOXACIN 2 MG/ML
400 INJECTION, SOLUTION INTRAVENOUS
Status: COMPLETED | OUTPATIENT
Start: 2023-03-23 | End: 2023-03-23

## 2023-03-23 RX ORDER — ONDANSETRON 4 MG/1
4 TABLET, FILM COATED ORAL EVERY 8 HOURS PRN
Qty: 12 TABLET | Refills: 0 | Status: SHIPPED | OUTPATIENT
Start: 2023-03-23 | End: 2023-03-23 | Stop reason: SDUPTHER

## 2023-03-23 RX ORDER — CIPROFLOXACIN 500 MG/1
500 TABLET ORAL 2 TIMES DAILY
Qty: 20 TABLET | Refills: 0 | Status: SHIPPED | OUTPATIENT
Start: 2023-03-23 | End: 2023-04-02

## 2023-03-23 RX ADMIN — CIPROFLOXACIN 400 MG: 2 INJECTION, SOLUTION INTRAVENOUS at 01:03

## 2023-03-23 NOTE — ED NOTES
Edita Riley, an 58 y.o. female presents to the ED c/o N/V and increased R side flank pain. Pt has current dx of kidney stone and is scheduled to have it removed. Has bilat nephrostomies. Pt ambulatory on EMS arrival. Pt poor historian at this time, continues to moan, providing limited answers to questions at this time. Pt refusing to get out of wheelchair or lift legs onto pedals, wheelchair locked at this time.       Review of patient's allergies indicates:   Allergen Reactions    Bee sting [allergen ext-venom-honey bee]      Rash      Grass pollen-bermuda, standard      rash     Chief Complaint   Patient presents with    Flank Pain     Pt has current kidney stone and has a surgery scheduled to have it removed. Pt complaining of increased right sided flank pain and nausea. Pt has bilateral nephrostomy tubes present. EMS state pt ambulated out of the house to meet them     Past Medical History:   Diagnosis Date    Abnormal mammogram 08/25/2020    Acute blood loss anemia     Acute deep vein thrombosis (DVT) of lower extremity 12/09/2020    Advance care planning 04/30/2021    Anemia due to chronic blood loss     Anemia due to chronic kidney disease     Anxiety     Bilateral ureteral obstruction 9/11/2020    Cardiovascular event risk -- low 09/14/2015    ASCVD 10-year risk 1.9% (with optimal risk factors 1.3%) as of 9/14/2015     Cervical cancer 2014    Chronic back pain     Colostomy care     Deep vein thrombosis     Depression     Diarrhea due to malabsorption 11/14/2018    Diarrhea due to malabsorption 11/14/2018    Difficult intubation     Disorder of kidney and ureter     DVT of lower extremity, bilateral 11/04/2020    Fibromyalgia     Fungemia 09/27/2020    Generalized abdominal pain 08/25/2020    GERD (gastroesophageal reflux disease)     Hemifacial spasm 09/16/2015    Hiatal hernia 2014    History of cervical cancer 10/11/2018    Hx of psychiatric care     Cymbalta, trazodone    Hypertension      Hypomagnesemia 11/21/2018    Lactose intolerance     Metastatic squamous cell carcinoma to lymph node 10/11/2018    Nephrostomy tube displaced     Neuropathy due to chemotherapeutic drug 11/14/2018    Osteoarthritis of back     Peritonitis 09/22/2020    Pseudomonas urinary tract infection 04/21/2021    Psychiatric problem     Refusal of blood transfusions as patient is Judaism     Schatzki's ring 09/14/2015    Seen on outside EGD 05/2014, underwent esophageal dilatation. Bx were negative.     Seizures     Sleep stage dysfunction     Noted on PSG 06/2017; negative for obstructive sleep apnea     Stroke     Urinary tract infection associated with nephrostomy catheter 06/11/2020    Wound infection after surgery 09/24/2020

## 2023-03-23 NOTE — SUBJECTIVE & OBJECTIVE
Past Medical History:   Diagnosis Date    Abnormal mammogram 08/25/2020    Acute blood loss anemia     Acute deep vein thrombosis (DVT) of lower extremity 12/09/2020    Advance care planning 04/30/2021    Anemia due to chronic blood loss     Anemia due to chronic kidney disease     Anxiety     Bilateral ureteral obstruction 9/11/2020    Cardiovascular event risk -- low 09/14/2015    ASCVD 10-year risk 1.9% (with optimal risk factors 1.3%) as of 9/14/2015     Cervical cancer 2014    Chronic back pain     Colostomy care     Deep vein thrombosis     Depression     Diarrhea due to malabsorption 11/14/2018    Diarrhea due to malabsorption 11/14/2018    Difficult intubation     Disorder of kidney and ureter     DVT of lower extremity, bilateral 11/04/2020    Fibromyalgia     Fungemia 09/27/2020    Generalized abdominal pain 08/25/2020    GERD (gastroesophageal reflux disease)     Hemifacial spasm 09/16/2015    Hiatal hernia 2014    History of cervical cancer 10/11/2018    Hx of psychiatric care     Cymbalta, trazodone    Hypertension     Hypomagnesemia 11/21/2018    Lactose intolerance     Metastatic squamous cell carcinoma to lymph node 10/11/2018    Nephrostomy tube displaced     Neuropathy due to chemotherapeutic drug 11/14/2018    Osteoarthritis of back     Peritonitis 09/22/2020    Pseudomonas urinary tract infection 04/21/2021    Psychiatric problem     Refusal of blood transfusions as patient is Caodaism     Schatzki's ring 09/14/2015    Seen on outside EGD 05/2014, underwent esophageal dilatation. Bx were negative.     Seizures     Sleep stage dysfunction     Noted on PSG 06/2017; negative for obstructive sleep apnea     Stroke     Urinary tract infection associated with nephrostomy catheter 06/11/2020    Wound infection after surgery 09/24/2020       Past Surgical History:   Procedure Laterality Date    ANTEGRADE NEPHROSTOGRAPHY Bilateral 9/28/2020    Procedure: Nephrostogram - antegrade;  Surgeon:  Leslie Balbuena MD;  Location: [facility] OR 1ST FLR;  Service: Urology;  Laterality: Bilateral;    ANTEGRADE NEPHROSTOGRAPHY Left 4/20/2021    Procedure: NEPHROSTOGRAM, ANTEGRADE;  Surgeon: Leslie Balbuena MD;  Location: [facility] OR 1ST FLR;  Service: Urology;  Laterality: Left;    ANTEGRADE NEPHROSTOGRAPHY Right 10/27/2022    Procedure: NEPHROSTOGRAM, ANTEGRADE;  Surgeon: Chirag Russ MD;  [facility];  Service: Urology;  Laterality: Right;    BILATERAL OOPHORECTOMY  2015    CHOLECYSTECTOMY  11/09/2016    Done at [facility], path showed chronic cholecystitis and gallstones    cold knife conization  2014    COLECTOMY, RIGHT  9/17/2020    Procedure: COLECTOMY, RIGHT Extended;  Surgeon: Hammad Reynolds MD;  Location: [facility] OR 2ND FLR;  Service: Colon and Rectal;;    COLONOSCOPY  2014    COLONOSCOPY N/A 10/24/2016    at [facility], Dr Gutiérrez    COLONOSCOPY N/A 5/18/2018    Procedure: COLONOSCOPY;  Surgeon: Arden Gutiérrez MD;  Location: [facility] (4TH FLR);  Service: Endoscopy;  Laterality: N/A;    COLONOSCOPY N/A 7/28/2020    Procedure: COLONOSCOPY;  Surgeon: Hammad Reynolds MD;  Location: [facility] (4TH FLR);  Service: Colon and Rectal;  Laterality: N/A;  covid test [place] 7/25    CYSTOSCOPY WITH URETEROSCOPY, RETROGRADE PYELOGRAPHY, AND INSERTION OF STENT Bilateral 3/21/2020    Procedure: CYSTOSCOPY, WITH RETROGRADE PYELOGRAM,;  Surgeon: Leslie Balbuena MD;  [facility];  Service: Urology;  Laterality: Bilateral;    DILATION OF NEPHROSTOMY TRACT Right 10/27/2022    Procedure: DILATION, NEPHROSTOMY TRACT;  Surgeon: Chirag Russ MD;  [facility];  Service: Urology;  Laterality: Right;    ESOPHAGOGASTRODUODENOSCOPY  2014    ESOPHAGOGASTRODUODENOSCOPY  11/18/2020    ESOPHAGOGASTRODUODENOSCOPY N/A 11/18/2020    Procedure: ESOPHAGOGASTRODUODENOSCOPY (EGD);  Surgeon: Zenon Spencer MD;  Location: [facility] ENDO;  Service: Endoscopy;  Laterality: N/A;     ESOPHAGOGASTRODUODENOSCOPY N/A 12/11/2020    Procedure: EGD (ESOPHAGOGASTRODUODENOSCOPY);  Surgeon: Juancho Muse MD;  Location: [facility] ENDO (2ND FLR);  Service: Endoscopy;  Laterality: N/A;    HYSTERECTOMY  2014    TV for cervical cancer    ILEOSTOMY  9/17/2020    Procedure: CREATION, ILEOSTOMY;  Surgeon: Hammad Reynolds MD;  Location: [facility] OR 2ND FLR;  Service: Colon and Rectal;;    LOOPOGRAM N/A 4/20/2021    Procedure: LOOPOGRAM;  Surgeon: Leslie Balbuena MD;  Location: [facility] OR 1ST FLR;  Service: Urology;  Laterality: N/A;    MOBILIZATION OF SPLENIC FLEXURE N/A 9/11/2020    Procedure: MOBILIZATION, COLONIC;  Surgeon: Hammad Reynolds MD;  Location: [facility] OR 2ND FLR;  Service: Colon and Rectal;  Laterality: N/A;    NEPHROSTOGRAPHY Bilateral 4/17/2021    Procedure: Nephrostogram;  Surgeon: Celeste Surgeon;  Location[facility];  Service: Anesthesiology;  Laterality: Bilateral;    OOPHORECTOMY      PYELOSCOPY Right 10/27/2022    Procedure: PYELOSCOPY;  Surgeon: Chirag Russ MD;  Location: [facility] OR 1ST FLR;  Service: Urology;  Laterality: Right;    REPLACEMENT OF NEPHROSTOMY TUBE Right 10/27/2022    Procedure: REPLACEMENT, NEPHROSTOMY TUBE;  Surgeon: Chirag Russ MD;  [facility];  Service: Urology;  Laterality: Right;    RETROPERITONEAL LYMPHADENECTOMY Right 9/17/2018    Procedure: LYMPHADENECTOMY, RETROPERITONEUM;  Surgeon: Sebas Reed MD;  Location: [facility] OR 2ND FLR;  Service: General;  Laterality: Right;  ILIAC    URETEROSCOPIC REMOVAL OF URETERIC CALCULUS Right 10/27/2022    Procedure: REMOVAL, CALCULUS, URETER, URETEROSCOPIC;  Surgeon: Chirag Russ MD;  Location: [facility] OR [facility] FLR;  Service: Urology;  Laterality: Right;    URETEROSCOPY Right 10/27/2022    Procedure: URETEROSCOPY-ANTEGRADE;  Surgeon: Chirag Russ MD;  Location: [facility] OR [facility] FLR;  Service: Urology;  Laterality: Right;       Review of patient's allergies indicates:   Allergen Reactions    Bee sting [allergen  ext-venom-honey bee]      Rash      Grass pollen-bermuda, standard      rash       Family History       Problem Relation (Age of Onset)    Breast cancer Maternal Aunt    Cancer Father, Mother    Cervical cancer Mother    Colon cancer Father    Drug abuse Daughter, Daughter    Esophageal cancer Father    Heart disease Sister, Maternal Grandmother    Suicide Daughter            Tobacco Use    Smoking status: Never    Smokeless tobacco: Never   Substance and Sexual Activity    Alcohol use: No     Alcohol/week: 0.0 standard drinks    Drug use: No    Sexual activity: Yes     Partners: Male     Birth control/protection: None     Comment:  17 years since 1999       Review of Systems    Objective:     Temp:  [97.9 °F (36.6 °C)-98.3 °F (36.8 °C)] 98.3 °F (36.8 °C)  Pulse:  [84-98] 98  Resp:  [14-18] 14  SpO2:  [100 %] 100 %  BP: (111-124)/(50-61) 111/61     There is no height or weight on file to calculate BMI.           Drains       Drain  Duration                  Urostomy 09/11/20 0000 ileal conduit  days         Ileostomy 09/18/20  days         Nephrostomy 02/14/23 Right 37 days         Nephrostomy 02/14/23 0918 Right 12 Fr. 36 days         Nephrostomy 03/02/23 1056 Left 12 Fr. 20 days                    Physical Exam  Constitutional:       Appearance: Normal appearance.   HENT:      Head: Normocephalic.   Cardiovascular:      Rate and Rhythm: Normal rate.   Pulmonary:      Effort: Pulmonary effort is normal. No respiratory distress.   Abdominal:      Palpations: Abdomen is soft.      Tenderness: There is abdominal tenderness.      Comments: Conduit with a pink healthy appearing stoma, yellow urine in conduit bag   Genitourinary:     Comments: Right nephrostomy tube draining more purulent yellow urine after flushing  Left nephrostomy tube draining clear yellow urine  Musculoskeletal:         General: Normal range of motion.   Skin:     General: Skin is warm.   Neurological:      General: No focal  deficit present.      Mental Status: She is alert and oriented to person, place, and time.   Psychiatric:         Mood and Affect: Mood normal.       Significant Labs:    BMP:  Recent Labs   Lab 03/22/23 2125      K 4.2   *   CO2 23   BUN 11   CREATININE 1.1   CALCIUM 8.5*       CBC:  Recent Labs   Lab 03/22/23 2125   WBC 10.33   HGB 10.4*   HCT 36.1*          Urine Studies:   Recent Labs   Lab 03/22/23 2127   COLORU Orange*   APPEARANCEUA Ex.Turbid   PHUR 6.0   SPECGRAV 1.020   PROTEINUA 2+*   GLUCUA Negative   KETONESU Negative   BILIRUBINUA Negative   OCCULTUA 3+*   NITRITE Positive*   LEUKOCYTESUR 3+*   RBCUA >100*   WBCUA >100*   BACTERIA Many*   HYALINECASTS 0     All pertinent labs results from the past 24 hours have been reviewed.    Significant Imaging:  CT: I have reviewed all results within the past 24 hours and my personal findings are:  pertinent findings in HPI

## 2023-03-23 NOTE — ASSESSMENT & PLAN NOTE
-CT shows interval development of hydronephrosis of right kidney. NT in appropriate position  -R NT flushed easily. The R [place] bag tube appeared to have purulence, so the bag was changed on the right  -No intervention for IR to replace R nephrostomy tube urgently.  -Scheduled for b/l NT replacement on 3/28 with IR  -Urine appears infected, but from ileal conduit

## 2023-03-23 NOTE — ED PROVIDER NOTES
Encounter Date: 3/22/2023    SCRIBE #1 NOTE: I, Delaney Cardoso, am scribing for, and in the presence of,  Jian Li MD. I have scribed the following portions of the note - Other sections scribed: HPI, ROS.     History     Chief Complaint   Patient presents with    Flank Pain     Pt has current kidney stone and has a surgery scheduled to have it removed. Pt complaining of increased right sided flank pain and nausea. Pt has bilateral nephrostomy tubes present. EMS state pt ambulated out of the house to meet them     Time patient was seen by the provider: 8:47 PM      The patient is a 58 y.o. female with past medical history of HTN, fibromyalgia, cervical cancer, DVT, seizures, stroke, bilateral nephrostomy tubes who presents to the ED with a complaint of abdominal pain since earlier today. Associated symptoms include nausea, vomiting, and subjective fever. She has a surgery scheduled to remove right sided kidney stone.    The history is provided by the patient and medical records. No  was used.   Review of patient's allergies indicates:   Allergen Reactions    Bee sting [allergen ext-venom-honey bee]      Rash      Grass pollen-bermuda, standard      rash     Past Medical History:   Diagnosis Date    Abnormal mammogram 08/25/2020    Acute blood loss anemia     Acute deep vein thrombosis (DVT) of lower extremity 12/09/2020    Advance care planning 04/30/2021    Anemia due to chronic blood loss     Anemia due to chronic kidney disease     Anxiety     Bilateral ureteral obstruction 9/11/2020    Cardiovascular event risk -- low 09/14/2015    ASCVD 10-year risk 1.9% (with optimal risk factors 1.3%) as of 9/14/2015     Cervical cancer 2014    Chronic back pain     Colostomy care     Deep vein thrombosis     Depression     Diarrhea due to malabsorption 11/14/2018    Diarrhea due to malabsorption 11/14/2018    Difficult intubation     Disorder of kidney and ureter     DVT of lower extremity,  bilateral 11/04/2020    Fibromyalgia     Fungemia 09/27/2020    Generalized abdominal pain 08/25/2020    GERD (gastroesophageal reflux disease)     Hemifacial spasm 09/16/2015    Hiatal hernia 2014    History of cervical cancer 10/11/2018    Hx of psychiatric care     Cymbalta, trazodone    Hypertension     Hypomagnesemia 11/21/2018    Lactose intolerance     Metastatic squamous cell carcinoma to lymph node 10/11/2018    Nephrostomy tube displaced     Neuropathy due to chemotherapeutic drug 11/14/2018    Osteoarthritis of back     Peritonitis 09/22/2020    Pseudomonas urinary tract infection 04/21/2021    Psychiatric problem     Refusal of blood transfusions as patient is Confucianism     Schatzki's ring 09/14/2015    Seen on outside EGD 05/2014, underwent esophageal dilatation. Bx were negative.     Seizures     Sleep stage dysfunction     Noted on PSG 06/2017; negative for obstructive sleep apnea     Stroke     Urinary tract infection associated with nephrostomy catheter 06/11/2020    Wound infection after surgery 09/24/2020     Past Surgical History:   Procedure Laterality Date    ANTEGRADE NEPHROSTOGRAPHY Bilateral 9/28/2020    Procedure: Nephrostogram - antegrade;  Surgeon: Leslie Balbuena MD;  Location: [address];  Service: Urology;  Laterality: Bilateral;    ANTEGRADE NEPHROSTOGRAPHY Left 4/20/2021    Procedure: NEPHROSTOGRAM, ANTEGRADE;  Surgeon: Leslie Balbuena MD;  Location: [address];  Service: Urology;  Laterality: Left;    ANTEGRADE NEPHROSTOGRAPHY Right 10/27/2022    Procedure: NEPHROSTOGRAM, ANTEGRADE;  Surgeon: Chirag Russ MD;  Location: [address];  Service: Urology;  Laterality: Right;    BILATERAL OOPHORECTOMY  2015    CHOLECYSTECTOMY  11/09/2016    Done at [facility], path showed chronic cholecystitis and gallstones    cold knife conization  2014    COLECTOMY, RIGHT  9/17/2020    Procedure: COLECTOMY, RIGHT Extended;  Surgeon: Hammad Reynolds MD;  Location: [facility]  2ND FLR;  Service: Colon and Rectal;;    COLONOSCOPY  2014    COLONOSCOPY N/A 10/24/2016    at [facility], Dr Gutiérrez    COLONOSCOPY N/A 5/18/2018    Procedure: COLONOSCOPY;  Surgeon: Arden Gutiérrez MD;  Location: [facility] (4TH FLR);  Service: Endoscopy;  Laterality: N/A;    COLONOSCOPY N/A 7/28/2020    Procedure: COLONOSCOPY;  Surgeon: Hammad Reynolds MD;  Location: [facility] (4TH FLR);  Service: Colon and Rectal;  Laterality: N/A;  covid test [place] 7/25    CYSTOSCOPY WITH URETEROSCOPY, RETROGRADE PYELOGRAPHY, AND INSERTION OF STENT Bilateral 3/21/2020    Procedure: CYSTOSCOPY, WITH RETROGRADE PYELOGRAM,;  Surgeon: Leslie Balbuena MD;  Location: [facility] OR 1ST FLR;  Service: Urology;  Laterality: Bilateral;    DILATION OF NEPHROSTOMY TRACT Right 10/27/2022    Procedure: DILATION, NEPHROSTOMY TRACT;  Surgeon: Chirag Russ MD;  Location: [facility] OR 1ST FLR;  Service: Urology;  Laterality: Right;    ESOPHAGOGASTRODUODENOSCOPY  2014    ESOPHAGOGASTRODUODENOSCOPY  11/18/2020    ESOPHAGOGASTRODUODENOSCOPY N/A 11/18/2020    Procedure: ESOPHAGOGASTRODUODENOSCOPY (EGD);  Surgeon: Zenon Spencer MD;  Location: [facility];  Service: Endoscopy;  Laterality: N/A;    ESOPHAGOGASTRODUODENOSCOPY N/A 12/11/2020    Procedure: EGD (ESOPHAGOGASTRODUODENOSCOPY);  Surgeon: Juancho Muse MD;  Location: [facility] (2ND FLR);  Service: Endoscopy;  Laterality: N/A;    HYSTERECTOMY  2014    [facility] for cervical cancer    ILEOSTOMY  9/17/2020    Procedure: CREATION, ILEOSTOMY;  Surgeon: Hammad Reynolds MD;  Location: [facility] OR 2ND FLR;  Service: Colon and Rectal;;    LOOPOGRAM N/A 4/20/2021    Procedure: LOOPOGRAM;  Surgeon: Leslie Balbuena MD;  Location: [facility] OR 1ST FLR;  Service: Urology;  Laterality: N/A;    MOBILIZATION OF SPLENIC FLEXURE N/A 9/11/2020    Procedure: MOBILIZATION, COLONIC;  Surgeon: Hammad Reynolds MD;  Location: [facility] OR 2ND FLR;  Service: Colon and Rectal;  Laterality: N/A;    NEPHROSTOGRAPHY Bilateral 4/17/2021     Procedure: Nephrostogram;  Surgeon: Celeste Surgeon;  Location: [facility];  Service: Anesthesiology;  Laterality: Bilateral;    OOPHORECTOMY      PYELOSCOPY Right 10/27/2022    Procedure: PYELOSCOPY;  Surgeon: Chirag Russ MD;  Location: [facility] OR 1ST FLR;  Service: Urology;  Laterality: Right;    REPLACEMENT OF NEPHROSTOMY TUBE Right 10/27/2022    Procedure: REPLACEMENT, NEPHROSTOMY TUBE;  Surgeon: Chirag Russ MD;  Location: [facility] OR 1ST FLR;  Service: Urology;  Laterality: Right;    RETROPERITONEAL LYMPHADENECTOMY Right 9/17/2018    Procedure: LYMPHADENECTOMY, RETROPERITONEUM;  Surgeon: Sebas Reed MD;  Location: [facility] OR 2ND FLR;  Service: General;  Laterality: Right;  ILIAC    URETEROSCOPIC REMOVAL OF URETERIC CALCULUS Right 10/27/2022    Procedure: REMOVAL, CALCULUS, URETER, URETEROSCOPIC;  Surgeon: Chirag Russ MD;  [facility];  Service: Urology;  Laterality: Right;    URETEROSCOPY Right 10/27/2022    Procedure: URETEROSCOPY-ANTEGRADE;  Surgeon: Chirag Russ MD;  [facility];  Service: Urology;  Laterality: Right;     Family History   Problem Relation Age of Onset    Heart disease Sister     Heart disease Maternal Grandmother     Colon cancer Father     Esophageal cancer Father     Cancer Father         Lung-smoker    Cancer Mother         Cervical    Cervical cancer Mother     Breast cancer Maternal Aunt     Suicide Daughter         jumped from parking structure    Drug abuse Daughter     Drug abuse Daughter     Rectal cancer Neg Hx     Stomach cancer Neg Hx     Crohn's disease Neg Hx     Ulcerative colitis Neg Hx     Diabetes Neg Hx     Hypertension Neg Hx      Social History     Tobacco Use    Smoking status: Never    Smokeless tobacco: Never   Substance Use Topics    Alcohol use: No     Alcohol/week: 0.0 standard drinks    Drug use: No     Review of Systems   Constitutional:  Positive for fever (subjective). Negative for chills.   HENT:  Negative for  nosebleeds.    Eyes:  Negative for visual disturbance.   Respiratory:  Negative for shortness of breath.    Cardiovascular:  Negative for chest pain.   Gastrointestinal:  Positive for abdominal pain, nausea and vomiting.   Genitourinary:  Negative for frequency.   Musculoskeletal:  Negative for joint swelling.   Skin:  Negative for rash.   Neurological:  Negative for numbness.   Hematological:  Does not bruise/bleed easily.   Psychiatric/Behavioral:  Negative for confusion.      Physical Exam     Initial Vitals   BP Pulse Resp Temp SpO2   03/22/23 1947 03/22/23 1947 03/22/23 1947 03/22/23 1949 03/22/23 1947   (!) 124/50 84 18 97.9 °F (36.6 °C) 100 %      MAP       --                Physical Exam    Nursing note and vitals reviewed.  Constitutional: She appears well-developed and well-nourished. She is not diaphoretic.   Patient appears   HENT:   Head: Normocephalic and atraumatic.   Eyes: Conjunctivae are normal.   Neck: Neck supple. No tracheal deviation present. No JVD present.   Normal range of motion.  Cardiovascular:  Normal rate, regular rhythm, normal heart sounds and intact distal pulses.           Pulmonary/Chest: Breath sounds normal. No respiratory distress. She has no wheezes. She has no rhonchi. She has no rales.   Abdominal: Abdomen is soft. Bowel sounds are normal. She exhibits no distension. There is abdominal tenderness (Diffuse).   Nephrostomy tubes are in place bilaterally There is no rebound.   Musculoskeletal:         General: No edema.      Cervical back: Normal range of motion and neck supple.     Neurological: She is alert. She has normal strength. No sensory deficit. GCS score is 15. GCS eye subscore is 4. GCS verbal subscore is 5. GCS motor subscore is 6.   Skin: Skin is warm. No rash noted.   Psychiatric: She has a normal mood and affect.       ED Course   Procedures  Labs Reviewed   CBC W/ AUTO DIFFERENTIAL - Abnormal; Notable for the following components:       Result Value    RBC 3.91  (*)     Hemoglobin 10.4 (*)     Hematocrit 36.1 (*)     MCH 26.6 (*)     MCHC 28.8 (*)     RDW 14.8 (*)     Gran # (ANC) 8.2 (*)     Gran % 79.7 (*)     Lymph % 13.0 (*)     All other components within normal limits   COMPREHENSIVE METABOLIC PANEL - Abnormal; Notable for the following components:    Chloride 112 (*)     Calcium 8.5 (*)     Albumin 3.1 (*)     eGFR 57.5 (*)     All other components within normal limits   URINALYSIS, REFLEX TO URINE CULTURE - Abnormal; Notable for the following components:    Color, UA Orange (*)     Protein, UA 2+ (*)     Occult Blood UA 3+ (*)     Nitrite, UA Positive (*)     Leukocytes, UA 3+ (*)     All other components within normal limits    Narrative:     Specimen Source->Urine   URINALYSIS MICROSCOPIC - Abnormal; Notable for the following components:    RBC, UA >100 (*)     WBC, UA >100 (*)     Bacteria Many (*)     All other components within normal limits    Narrative:     Specimen Source->Urine   ISTAT PROCEDURE - Abnormal; Notable for the following components:    POC Chloride 112 (*)     POC Ionized Calcium 1.01 (*)     All other components within normal limits   CULTURE, URINE   LIPASE          Imaging Results               CT Abdomen Pelvis With Contrast (Final result)  Result time 03/23/23 00:01:15      Final result by Fransisco Richards MD (03/23/23 00:01:15)                   Impression:      1.  Right-sided nephrostomy tube in place with interval development of moderate to severe hydronephrosis, noting stable calcification within the mid right ureter.  There is a new small focus of gas within the right renal collecting system at the UPJ which may relate to catheter manipulation versus infectious process.    2.  Left-sided nephrostomy tube in place without evidence of hydronephrosis.    3.  Bilateral urothelial enhancement and periureteral inflammatory change may relate to ascending urinary tract infectious process.  Clinical correlation is advised.    4.  Mildly  distended fluid-filled loops of small bowel within the anterior abdomen, some of which demonstrate fecalization of small bowel contents.  Additionally, there is a small volume of surrounding free fluid about the distended loops of small bowel.  Findings may relate to developing small bowel obstruction.  Clinical correlation is advised.    5.  Additional stable findings as above.    This report was flagged in Epic as abnormal.      Electronically signed by: Fransisco Richards MD  Date:    03/23/2023  Time:    00:01               Narrative:    EXAMINATION:  CT ABDOMEN PELVIS WITH CONTRAST    CLINICAL HISTORY:  Abdominal abscess/infection suspected;    TECHNIQUE:  Low dose axial images, sagittal and coronal reformations were obtained from the lung bases to the pubic symphysis following the IV administration of 100 mL of Omnipaque 350 .  Oral contrast was not given.    COMPARISON:  CT 03/03/2023    FINDINGS:  There are scattered bandlike opacities at the lung bases suggesting atelectasis or scarring.  There is a stable subcentimeter nodular opacity within the right middle lobe.  There is no new large confluent airspace consolidation or pleural fluid identified.  The visualized portions of the heart appear normal.    The liver is normal in size.  There is a vague ill-defined region of peripheral enhancement within the right hepatic lobe near the hepatic dome which may relate to transient hepatic attenuation differences.  The portal vein is patent.  Gallbladder is surgically absent.  There is stable mild prominence of the extrahepatic common bile duct which may relate to post cholecystectomy status.    The stomach, spleen, pancreas, and adrenal glands are unremarkable.    There are bilateral nephrostomy tubes in place.  There is interval development of moderate to severe right-sided hydronephrosis.  There is a stable 0.4 cm calcification within the mid right ureter.  There are nonobstructing right lower pole renal calculi.   There is mild right-sided urothelial enhancement and periureteral inflammatory change.  There is a small focus of gas at the right UPJ.  The left kidney demonstrates no evidence of hydronephrosis.  There is left-sided urothelial enhancement and mild periureteral inflammatory change.  There is a left lower pole renal cyst.  No definite calculus identified within the left ureter.  Postsurgical change of prior colon conduit urinary diversion with left lower quadrant urostomy.  Urinary bladder is nondistended.  Uterus is surgically absent.    The abdominal aorta is normal in course and caliber without significant atherosclerotic calcifications.    There are postsurgical change of prior right hemicolectomy with right lower quadrant ileostomy.  There are mildly distended predominantly fluid-filled loops of small bowel within the anterior abdomen, new from prior examination.  There is some fecalization of small bowel contents within the mildly distended small bowel.  There is a small volume of surrounding free fluid also now present.  Findings are nonspecific, although developing small-bowel obstruction is a consideration.  No discrete transition point identified.  There is no free intraperitoneal air or portal venous gas.    There are scattered mildly prominent periaortic lymph nodes, similar to prior examination.    No acute skeletal abnormality identified.  The extraperitoneal soft tissues are unremarkable.                                       Medications   sodium chloride 0.9% bolus 1,000 mL 1,000 mL (0 mLs Intravenous Stopped 3/22/23 2243)   ondansetron injection 4 mg (4 mg Intravenous Given 3/22/23 2141)   morphine injection 4 mg (4 mg Intravenous Given 3/22/23 2144)   iohexoL (OMNIPAQUE 350) injection 100 mL (100 mLs Intravenous Given 3/22/23 2254)   ciprofloxacin (CIPRO)400mg/200ml D5W IVPB 400 mg (0 mg Intravenous Stopped 3/23/23 0212)     Medical Decision Making:   History:   Old Medical Records: I decided to  obtain old medical records.  Initial Assessment:   58-year-old female with history of bilateral nephrostomy tubes presents with severe abdominal pain with associated vomiting.  Will check labs to screen for sepsis and a CT scan of the abdomen pelvis.  ED Management:  I reviewed studies.  Patient does not have elevated white count or creatinine.  Urine has greater than 100 white cells as a chronically does.  CT scan shows right-sided hydronephrosis with small amount of air in the collection system.  She has a mid ureter stone.  She looks much more comfortable after 4 mg of morphine and nontoxic.    I am not sure this urine result reflects infection.  She has nephrostomy tubes and chronically has elevated white cells in the urine.  Sometimes her cultures grow out bacteria and sometimes not.  Last time was Klebsiella that was sensitive to Cipro but resistant to ceftriaxone.  Will give dose of Cipro here.    I consult and discussed case with the Urology resident on-call.  Reviewed the medical record and imaging.  There was question why she would have hydronephrosis with a nephrostomy tube.  It seems that it is in the correct position on CT and it drained urine because her bags were full.  Urology resident will evaluate patient.    Urology evaluated the patient.  No acute intervention required.  Urology and I agree that she is stable for discharge.  She will follow-up with them for dressing the stones.  She continues to be nontoxic.  Will discharge home on Cipro since her last urine was sensitive to it.        Scribe Attestation:   Scribe #1: I performed the above scribed service and the documentation accurately describes the services I performed. I attest to the accuracy of the note.                   Clinical Impression:   Final diagnoses:  [R10.9] Abdominal pain, unspecified abdominal location (Primary)  [N13.30] Hydronephrosis, unspecified hydronephrosis type        ED Disposition Condition    Discharge Stable           ED Prescriptions       Medication Sig Dispense Start Date End Date Auth. Provider    ciprofloxacin HCl (CIPRO) 500 MG tablet Take 1 tablet (500 mg total) by mouth 2 (two) times daily. for 10 days 20 tablet 3/23/2023 4/2/2023 Jian Li MD    ondansetron (ZOFRAN) 4 MG tablet  (Status: Discontinued) Take 1 tablet (4 mg total) by mouth every 8 (eight) hours as needed for Nausea. 12 tablet 3/23/2023 3/23/2023 Jian Li MD    ondansetron (ZOFRAN) 4 MG tablet Take 1 tablet (4 mg total) by mouth every 8 (eight) hours as needed for Nausea. 12 tablet 3/23/2023 3/26/2023 Jian Li MD          Follow-up Information       Follow up With Specialties Details Why Contact Info    Your Primary Care Physician  Schedule an appointment as soon as possible for a visit in 2 days      [facility] - Emergency Dept Emergency Medicine  If symptoms worsen [address] [id number] [phone]    Chirag Russ MD Urology Schedule an appointment as soon as possible for a visit   [address] [facility] [address]  [phone]               Jian Li MD  03/23/23 0716

## 2023-03-23 NOTE — HPI
Edita Riley is a 58 y.o. female who is presents to the ED for generalized abdominal pain, poorly draining right nephrostomy tube, and n/v. Urology consulted for new right-sided hydronephrosis. History of distal ureteral strcitures due to XRT for cervical cancer.  She underwent a transverse colon conduit on 9/11/2020.  The patient had bilateral hydronephrosis with reflux on the right side and a sluggish ureter on the left side.  This did not reflux.  She however, had kidney stones and was reverred to Dr. Russ for management. Bilateral percutaneous nephrostomy tubes since Apr 2021  She underwent a percutaneous antegrade approach on 10/27/2022.   She remains with bilateral percutaneous nephrostomy tubes.  She had a CT scan done on one of her ER visits on 12/2/2022 which showed a 2 mm stone in the right ureter and a 6 mm stone in the lower pole.  The left NT was accidentally removed on 2/13/2023 and it was replaced on 2/14/2023.  The right was replaced at the same time, per IR's note. Return visit to the ED 3/1/23 for dislodged L NT which was replaced on 3/2/23.     She states that over the last week her right nephrostomy tube has had less output than the left NT. She subsequently developped generalized abdominal pain that worsened yesterday. Reports onset of n/v and reported fevers at home. Good urine output from ileal conduit and healthy appearing stoma. Denies hematuria from stoma. On assessment, AFVSS. Cr 1.1 baseline ~1.0, WBC 10.3, UA positive for 3+ blood, 3+ leuks, nitrite positive, many bacteria. CT shows bilateral nephrostomy tubes in correct anatomic position, new onset right sided hydronephrosis, no hydro on the left, 4mm stone in the mid right ureter with proximal hydroureter, small lower pole renal stones in the right kidney.    She is schedule for nephrostomy tube exchange with IR on 3/28

## 2023-03-23 NOTE — ED NOTES
I-STAT Chem-8+ Results:   Value Reference Range   Sodium 144 136-145 mmol/L   Potassium  4.1 3.5-5.1 mmol/L   Chloride 112  mmol/L   Ionized Calcium 1.01 1.06-1.42 mmol/L   CO2 (measured) 24 23-29 mmol/L   Glucose 104  mg/dL   BUN 12 6-30 mg/dL   Creatinine 1.1 0.5-1.4 mg/dL   Hematocrit 36 36-54%

## 2023-03-27 ENCOUNTER — TELEPHONE (OUTPATIENT)
Dept: UROLOGY | Facility: CLINIC | Age: 58
End: 2023-03-27
Payer: MEDICAID

## 2023-03-27 ENCOUNTER — TELEPHONE (OUTPATIENT)
Dept: INTERVENTIONAL RADIOLOGY/VASCULAR | Facility: HOSPITAL | Age: 58
End: 2023-03-27
Payer: MEDICAID

## 2023-03-27 NOTE — TELEPHONE ENCOUNTER
----- Message from Gokul Conway MD sent at 3/27/2023  7:56 AM CDT -----  Can we schedule patient with Dr. Russ to discuss management of her right ureteral stone w/ a colon conduit?    Thanks,  KASSI Conway MD  Urology, PGY-5  Pager: [phone]

## 2023-03-27 NOTE — TELEPHONE ENCOUNTER
----- Message from Gokul Conway MD sent at 3/27/2023  7:56 AM CDT -----  Can we schedule patient with Dr. Russ to discuss management of her right ureteral stone w/ a colon conduit?    Thanks,  KASSI Conway MD  Urology, PGY-5  Pager: [phone]

## 2023-03-27 NOTE — TELEPHONE ENCOUNTER
I SWP Edita and scheduled Jeb OV per below.  Pt for bilateral neph tube exchange on 3/28/2023. Thank you.     3/29/2023 Status: [facility]   Appt Time: 8:45 AM Length: 15   Visit Type: ESTABLISHED PATIENT        ----- Message from Gokul Conway MD sent at 3/27/2023  7:56 AM CDT -----  Can we schedule patient with Dr. Russ to discuss management of her right ureteral stone w/ a colon conduit?    Thanks,  KASSI Conway MD  Urology, PGY-5  Pager: [phone]

## 2023-03-28 ENCOUNTER — HOSPITAL ENCOUNTER (OUTPATIENT)
Dept: INTERVENTIONAL RADIOLOGY/VASCULAR | Facility: HOSPITAL | Age: 58
Discharge: HOME | End: 2023-03-28
Payer: MEDICAID

## 2023-03-28 VITALS
HEART RATE: 65 BPM | DIASTOLIC BLOOD PRESSURE: 55 MMHG | HEIGHT: 69 IN | OXYGEN SATURATION: 100 % | BODY MASS INDEX: 26.66 KG/M2 | SYSTOLIC BLOOD PRESSURE: 100 MMHG | WEIGHT: 180 LBS | RESPIRATION RATE: 17 BRPM | TEMPERATURE: 98 F

## 2023-03-28 DIAGNOSIS — T83.512D URINARY TRACT INFECTION ASSOCIATED WITH NEPHROSTOMY CATHETER, SUBSEQUENT ENCOUNTER: ICD-10-CM

## 2023-03-28 DIAGNOSIS — N13.5 OBSTRUCTION OF URETER, UNSPECIFIED LATERALITY: ICD-10-CM

## 2023-03-28 DIAGNOSIS — N39.0 URINARY TRACT INFECTION ASSOCIATED WITH NEPHROSTOMY CATHETER, SUBSEQUENT ENCOUNTER: ICD-10-CM

## 2023-03-28 DIAGNOSIS — N13.5 OBSTRUCTION OF URETER, UNSPECIFIED LATERALITY: Primary | ICD-10-CM

## 2023-03-28 DIAGNOSIS — N13.30 HYDRONEPHROSIS, UNSPECIFIED HYDRONEPHROSIS TYPE: ICD-10-CM

## 2023-03-28 PROCEDURE — 50435 IR NEPHROSTOMY TUBE CHANGE: ICD-10-PCS | Mod: 50,,, | Performed by: RADIOLOGY

## 2023-03-28 PROCEDURE — 50435 EXCHANGE NEPHROSTOMY CATH: CPT

## 2023-03-28 PROCEDURE — 25000003 PHARM REV CODE 250: Performed by: RADIOLOGY

## 2023-03-28 PROCEDURE — 50435 EXCHANGE NEPHROSTOMY CATH: CPT | Mod: 50,,, | Performed by: RADIOLOGY

## 2023-03-28 PROCEDURE — 63600175 PHARM REV CODE 636 W HCPCS: Performed by: RADIOLOGY

## 2023-03-28 PROCEDURE — A4357 BEDSIDE DRAINAGE BAG: HCPCS

## 2023-03-28 PROCEDURE — 25500020 PHARM REV CODE 255

## 2023-03-28 RX ORDER — LIDOCAINE HYDROCHLORIDE 10 MG/ML
INJECTION INFILTRATION; PERINEURAL
Status: COMPLETED | OUTPATIENT
Start: 2023-03-28 | End: 2023-03-28

## 2023-03-28 RX ORDER — FENTANYL CITRATE 50 UG/ML
INJECTION, SOLUTION INTRAMUSCULAR; INTRAVENOUS
Status: COMPLETED | OUTPATIENT
Start: 2023-03-28 | End: 2023-03-28

## 2023-03-28 RX ORDER — MIDAZOLAM HYDROCHLORIDE 1 MG/ML
INJECTION INTRAMUSCULAR; INTRAVENOUS
Status: COMPLETED | OUTPATIENT
Start: 2023-03-28 | End: 2023-03-28

## 2023-03-28 RX ORDER — SODIUM CHLORIDE 9 MG/ML
75 INJECTION, SOLUTION INTRAVENOUS CONTINUOUS
Status: DISCONTINUED | OUTPATIENT
Start: 2023-03-28 | End: 2023-03-29 | Stop reason: HOSPADM

## 2023-03-28 RX ORDER — LIDOCAINE HYDROCHLORIDE 10 MG/ML
1 INJECTION, SOLUTION EPIDURAL; INFILTRATION; INTRACAUDAL; PERINEURAL ONCE
Status: DISCONTINUED | OUTPATIENT
Start: 2023-03-28 | End: 2023-03-29 | Stop reason: HOSPADM

## 2023-03-28 RX ADMIN — FENTANYL CITRATE 50 MCG: 50 INJECTION, SOLUTION INTRAMUSCULAR; INTRAVENOUS at 09:03

## 2023-03-28 RX ADMIN — IOHEXOL 20 ML: 300 INJECTION, SOLUTION INTRAVENOUS at 10:03

## 2023-03-28 RX ADMIN — MIDAZOLAM HYDROCHLORIDE 1 MG: 1 INJECTION INTRAMUSCULAR; INTRAVENOUS at 09:03

## 2023-03-28 RX ADMIN — CEFTRIAXONE 1 G: 1 INJECTION, POWDER, FOR SOLUTION INTRAMUSCULAR; INTRAVENOUS at 09:03

## 2023-03-28 RX ADMIN — LIDOCAINE HYDROCHLORIDE 5 ML: 10 INJECTION, SOLUTION INFILTRATION; PERINEURAL at 09:03

## 2023-03-28 NOTE — PLAN OF CARE
1hr IR recovery complete. VSS. Dressing CDI. IV removed. Discharge instructions reviewed and given. Pt. Ready for transport via wheelchair to main entrance to meet , Hammad.  Hammad doesn't have a car today, but he came here and they are getting a ride home together.

## 2023-03-28 NOTE — PLAN OF CARE
Pt arrived to  for nephrostomy tube exhange. Pt oriented to unit and staff. Plan of care reviewed with patient, patient verbalizes understanding. Comfort measures utilized. Pt safely transferred from stretcher to procedural table. Fall risk reviewed with patient, fall risk interventions maintained. Safety strap applied, positioner pillows utilized to minimize pressure points. Blankets applied. Pt prepped and draped utilizing standard sterile technique. Patient placed on continuous monitoring, as required by sedation policy. Timeouts completed utilizing standard universal time-out, per department and facility policy. RN to remain at bedside, continuous monitoring maintained. Pt resting comfortably. Denies pain/discomfort. Will continue to monitor. See flow sheets for monitoring, medication administration, and updates.

## 2023-03-28 NOTE — H&P
Radiology History & Physical      SUBJECTIVE:     Chief Complaint: Bilateral Hydronephrosis    History of Present Illness:  Edita Riley is a 58 y.o. female with history of distal ureteral strictures due to radiation therapy for cervical cancer. She developed bilateral hydronephrosis with placement of BL nephrostomy tubes. Patient states she's had stable output from both tubes with slight change in color from clear to cloudy. She also endorses some pain around the right site. She presents today for bilateral nephrostomy tube exchange.     Past Medical History:   Diagnosis Date    Abnormal mammogram 08/25/2020    Acute blood loss anemia     Acute deep vein thrombosis (DVT) of lower extremity 12/09/2020    Advance care planning 04/30/2021    Anemia due to chronic blood loss     Anemia due to chronic kidney disease     Anxiety     Bilateral ureteral obstruction 9/11/2020    Cardiovascular event risk -- low 09/14/2015    ASCVD 10-year risk 1.9% (with optimal risk factors 1.3%) as of 9/14/2015     Cervical cancer 2014    Chronic back pain     Colostomy care     Deep vein thrombosis     Depression     Diarrhea due to malabsorption 11/14/2018    Diarrhea due to malabsorption 11/14/2018    Difficult intubation     Disorder of kidney and ureter     DVT of lower extremity, bilateral 11/04/2020    Fibromyalgia     Fungemia 09/27/2020    Generalized abdominal pain 08/25/2020    GERD (gastroesophageal reflux disease)     Hemifacial spasm 09/16/2015    Hiatal hernia 2014    History of cervical cancer 10/11/2018    Hx of psychiatric care     Cymbalta, trazodone    Hypertension     Hypomagnesemia 11/21/2018    Lactose intolerance     Metastatic squamous cell carcinoma to lymph node 10/11/2018    Nephrostomy tube displaced     Neuropathy due to chemotherapeutic drug 11/14/2018    Osteoarthritis of back     Peritonitis 09/22/2020    Pseudomonas urinary tract infection 04/21/2021    Psychiatric problem     Refusal of  blood transfusions as patient is Pentec[employer]al     Schatzki's ring 09/14/2015    Seen on outside EGD 05/2014, underwent esophageal dilatation. Bx were negative.     Seizures     Sleep stage dysfunction     Noted on PSG 06/2017; negative for obstructive sleep apnea     Stroke     Urinary tract infection associated with nephrostomy catheter 06/11/2020    Wound infection after surgery 09/24/2020     Past Surgical History:   Procedure Laterality Date    ANTEGRADE NEPHROSTOGRAPHY Bilateral 9/28/2020    Procedure: Nephrostogram - antegrade;  Surgeon: Leslie Balbuena MD;  Location: [facility] OR 1ST FLR;  Service: Urology;  Laterality: Bilateral;    ANTEGRADE NEPHROSTOGRAPHY Left 4/20/2021    Procedure: NEPHROSTOGRAM, ANTEGRADE;  Surgeon: Leslie Balbuena MD;  Location: [facility] OR 1ST FLR;  Service: Urology;  Laterality: Left;    ANTEGRADE NEPHROSTOGRAPHY Right 10/27/2022    Procedure: NEPHROSTOGRAM, ANTEGRADE;  Surgeon: Chirag Russ MD;  [facility];  Service: Urology;  Laterality: Right;    BILATERAL OOPHORECTOMY  2015    CHOLECYSTECTOMY  11/09/2016    Done at [facility], path showed chronic cholecystitis and gallstones    cold knife conization  2014    COLECTOMY, RIGHT  9/17/2020    Procedure: COLECTOMY, RIGHT Extended;  Surgeon: Hammad Reynolds MD;  Location: [facility] OR 2ND FLR;  Service: Colon and Rectal;;    COLONOSCOPY  2014    COLONOSCOPY N/A 10/24/2016    at [facility], Dr Gutiérrez    COLONOSCOPY N/A 5/18/2018    Procedure: COLONOSCOPY;  Surgeon: Arden Gutiérrez MD;  Location: [facility] (4TH FLR);  Service: Endoscopy;  Laterality: N/A;    COLONOSCOPY N/A 7/28/2020    Procedure: COLONOSCOPY;  Surgeon: Hammad Reynolds MD;  Location: [facility] ENDO (4TH FLR);  Service: Colon and Rectal;  Laterality: N/A;  covid test [place] 7/25    CYSTOSCOPY WITH URETEROSCOPY, RETROGRADE PYELOGRAPHY, AND INSERTION OF STENT Bilateral 3/21/2020    Procedure: CYSTOSCOPY, WITH RETROGRADE PYELOGRAM,;  Surgeon: Leslie Balbuena MD;   Location: [facility] OR 1ST FLR;  Service: Urology;  Laterality: Bilateral;    DILATION OF NEPHROSTOMY TRACT Right 10/27/2022    Procedure: DILATION, NEPHROSTOMY TRACT;  Surgeon: Chirag Russ MD;  Location: [facility] OR 1ST FLR;  Service: Urology;  Laterality: Right;    ESOPHAGOGASTRODUODENOSCOPY  2014    ESOPHAGOGASTRODUODENOSCOPY  11/18/2020    ESOPHAGOGASTRODUODENOSCOPY N/A 11/18/2020    Procedure: ESOPHAGOGASTRODUODENOSCOPY (EGD);  Surgeon: Zenon Spencer MD;  Location: [facility] ENDO;  Service: Endoscopy;  Laterality: N/A;    ESOPHAGOGASTRODUODENOSCOPY N/A 12/11/2020    Procedure: EGD (ESOPHAGOGASTRODUODENOSCOPY);  Surgeon: Juancho Muse MD;  Location: [facility] (2ND FLR);  Service: Endoscopy;  Laterality: N/A;    HYSTERECTOMY  2014    TV for cervical cancer    ILEOSTOMY  9/17/2020    Procedure: CREATION, ILEOSTOMY;  Surgeon: Hammad Reynolds MD;  Location: [facility] OR 2ND FLR;  Service: Colon and Rectal;;    LOOPOGRAM N/A 4/20/2021    Procedure: LOOPOGRAM;  Surgeon: Leslie Balbuena MD;  Location: [facility] OR 1ST FLR;  Service: Urology;  Laterality: N/A;    MOBILIZATION OF SPLENIC FLEXURE N/A 9/11/2020    Procedure: MOBILIZATION, COLONIC;  Surgeon: Hammad Reynolds MD;  Location: [facility] OR 2ND FLR;  Service: Colon and Rectal;  Laterality: N/A;    NEPHROSTOGRAPHY Bilateral 4/17/2021    Procedure: Nephrostogram;  Surgeon: Celeste Surgeon;  Location: [facility];  Service: Anesthesiology;  Laterality: Bilateral;    OOPHORECTOMY      PYELOSCOPY Right 10/27/2022    Procedure: PYELOSCOPY;  Surgeon: Chirag Russ MD;  Location: [facility] OR 1ST FLR;  Service: Urology;  Laterality: Right;    REPLACEMENT OF NEPHROSTOMY TUBE Right 10/27/2022    Procedure: REPLACEMENT, NEPHROSTOMY TUBE;  Surgeon: Chirag Russ MD;  Location: [facility] OR 1ST FLR;  Service: Urology;  Laterality: Right;    RETROPERITONEAL LYMPHADENECTOMY Right 9/17/2018    Procedure: LYMPHADENECTOMY, RETROPERITONEUM;  Surgeon: Sebas Reed MD;  Location: [facility] OR 2ND  FLR;  Service: General;  Laterality: Right;  ILIAC    URETEROSCOPIC REMOVAL OF URETERIC CALCULUS Right 10/27/2022    Procedure: REMOVAL, CALCULUS, URETER, URETEROSCOPIC;  Surgeon: Chirag Russ MD;  Location: [facility] OR [address] [place] [address];  Service: Urology;  Laterality: Right;    URETEROSCOPY Right 10/27/2022    Procedure: URETEROSCOPY-ANTEGRADE;  Surgeon: Chirag Russ MD;  Location: [facility] OR [address] [place] [address];  Service: Urology;  Laterality: Right;       Home Meds:   Prior to Admission medications    Medication Sig Start Date End Date Taking? Authorizing Provider   ciprofloxacin HCl (CIPRO) 500 MG tablet Take 1 tablet (500 mg total) by mouth 2 (two) times daily. for 10 days 3/23/23 4/2/23  Jian Li MD   gabapentin (NEURONTIN) 100 MG capsule Take 2 capsules (200 mg total) by mouth every evening. 9/1/22   Danuta Barboza MD   ketorolac (TORADOL) 10 mg tablet Take 1 tablet (10 mg total) by mouth every 6 (six) hours as needed for Pain. 9/8/22   Denise Brooks NP   melatonin (MELATIN) 3 mg tablet Take 2 tablets (6 mg total) by mouth nightly as needed for Insomnia. 9/6/21   Kacey Bergeron MD   mirtazapine (REMERON SOL-TAB) 15 MG disintegrating tablet Dissolve 1 tablet (15 mg total) by mouth nightly. 2/15/22 2/15/23  Diony Ram MD   sodium bicarbonate 650 MG tablet Take 2 tablets (1,300 mg total) by mouth 2 (two) times daily. 2/15/22 2/15/23  Diony Ram MD   sucralfate (CARAFATE) 1 gram tablet Take 1 tablet (1 g total) by mouth 4 (four) times daily before meals and nightly. 9/1/22   Danuta Barboza MD   traMADoL (ULTRAM) 50 mg tablet Take 1 tablet (50 mg total) by mouth every 6 (six) hours. 3/2/23   James Guzman MD     Anticoagulants/Antiplatelets: no anticoagulation    Allergies:   Review of patient's allergies indicates:   Allergen Reactions    Bee sting [allergen ext-venom-honey bee]      Rash      Grass pollen-bermuda, standard      rash     Sedation History:  have not been any systemic  reactions    Review of Systems:   Hematological: no known coagulopathies  Respiratory: no shortness of breath  Cardiovascular: no chest pain  Gastrointestinal: generalized abdominal pain; mild nausea  Genito-Urinary: no dysuria  Musculoskeletal: negative  Neurological: no TIA or stroke symptoms         OBJECTIVE:     Vital Signs (Most Recent)       Physical Exam:  ASA: 3  Mallampati: 3    General: no acute distress  Mental Status: alert and oriented to person, place and time  HEENT: normocephalic, atraumatic  Chest: unlabored breathing  Heart: regular heart rate  Abdomen: nondistended; sites with bandages overlying which appear dry without significant drainag.e   Extremity: moves all extremities    Laboratory  Lab Results   Component Value Date    INR 1.0 03/01/2023       Lab Results   Component Value Date    WBC 10.33 03/22/2023    HGB 10.4 (L) 03/22/2023    HCT 36 03/22/2023    MCV 92 03/22/2023     03/22/2023      Lab Results   Component Value Date    GLU 97 03/22/2023     03/22/2023    K 4.2 03/22/2023     (H) 03/22/2023    CO2 23 03/22/2023    BUN 11 03/22/2023    CREATININE 1.1 03/22/2023    CALCIUM 8.5 (L) 03/22/2023    MG 1.5 (L) 02/16/2023    ALT 12 03/22/2023    AST 18 03/22/2023    ALBUMIN 3.1 (L) 03/22/2023    BILITOT 0.2 03/22/2023    BILIDIR 0.2 12/04/2020       ASSESSMENT/PLAN:     Sedation Plan: Up to Moderate.   Patient will undergo bilateral nephrostomy tube exchange.    Julia Thompson MD  Radiology, PGY II  [facility]

## 2023-03-28 NOTE — DISCHARGE INSTRUCTIONS
[facility] [phone] (MON-FRI 8 AM- 5PM). Radiology Resident on call [phone].

## 2023-03-28 NOTE — PLAN OF CARE
Bilateral Nephrostomy tube exchange complete. Pt tolerated well. VSS. No signs or symptoms of distress noted.  Site CDI.  Pt will be transferred to ROCU bed and report to be given at bedside.

## 2023-03-29 ENCOUNTER — TELEPHONE (OUTPATIENT)
Dept: UROLOGY | Facility: CLINIC | Age: 58
End: 2023-03-29

## 2023-03-29 ENCOUNTER — TELEPHONE (OUTPATIENT)
Dept: PREADMISSION TESTING | Facility: HOSPITAL | Age: 58
End: 2023-03-29
Payer: MEDICAID

## 2023-03-29 ENCOUNTER — OFFICE VISIT (OUTPATIENT)
Dept: UROLOGY | Facility: CLINIC | Age: 58
End: 2023-03-29
Payer: MEDICAID

## 2023-03-29 VITALS
BODY MASS INDEX: 27.7 KG/M2 | DIASTOLIC BLOOD PRESSURE: 66 MMHG | HEIGHT: 69 IN | SYSTOLIC BLOOD PRESSURE: 114 MMHG | WEIGHT: 187 LBS | HEART RATE: 70 BPM

## 2023-03-29 DIAGNOSIS — N20.1 RIGHT URETERAL STONE: Primary | ICD-10-CM

## 2023-03-29 DIAGNOSIS — N13.5 OBSTRUCTION OF URETER, UNSPECIFIED LATERALITY: Primary | ICD-10-CM

## 2023-03-29 DIAGNOSIS — N18.30 STAGE 3 CHRONIC KIDNEY DISEASE, UNSPECIFIED WHETHER STAGE 3A OR 3B CKD: ICD-10-CM

## 2023-03-29 DIAGNOSIS — N20.0 RIGHT RENAL STONE: ICD-10-CM

## 2023-03-29 DIAGNOSIS — N39.0 COMPLICATED UTI (URINARY TRACT INFECTION): ICD-10-CM

## 2023-03-29 DIAGNOSIS — N13.30 HYDRONEPHROSIS, UNSPECIFIED HYDRONEPHROSIS TYPE: ICD-10-CM

## 2023-03-29 DIAGNOSIS — N20.0 KIDNEY STONES: Primary | ICD-10-CM

## 2023-03-29 DIAGNOSIS — N13.1 HYDRONEPHROSIS WITH URETERAL STRICTURE, NOT ELSEWHERE CLASSIFIED: ICD-10-CM

## 2023-03-29 PROCEDURE — 1160F RVW MEDS BY RX/DR IN RCRD: CPT | Mod: CPTII,,, | Performed by: UROLOGY

## 2023-03-29 PROCEDURE — 1159F MED LIST DOCD IN RCRD: CPT | Mod: CPTII,,, | Performed by: UROLOGY

## 2023-03-29 PROCEDURE — 3074F PR MOST RECENT SYSTOLIC BLOOD PRESSURE < 130 MM HG: ICD-10-PCS | Mod: CPTII,,, | Performed by: UROLOGY

## 2023-03-29 PROCEDURE — 3008F PR BODY MASS INDEX (BMI) DOCUMENTED: ICD-10-PCS | Mod: CPTII,,, | Performed by: UROLOGY

## 2023-03-29 PROCEDURE — 1160F PR REVIEW ALL MEDS BY PRESCRIBER/CLIN PHARMACIST DOCUMENTED: ICD-10-PCS | Mod: CPTII,,, | Performed by: UROLOGY

## 2023-03-29 PROCEDURE — 99213 OFFICE O/P EST LOW 20 MIN: CPT | Mod: PBBFAC | Performed by: UROLOGY

## 2023-03-29 PROCEDURE — 3078F DIAST BP <80 MM HG: CPT | Mod: CPTII,,, | Performed by: UROLOGY

## 2023-03-29 PROCEDURE — 99999 PR PBB SHADOW E&M-EST. PATIENT-LVL III: CPT | Mod: PBBFAC,,, | Performed by: UROLOGY

## 2023-03-29 PROCEDURE — 99214 OFFICE O/P EST MOD 30 MIN: CPT | Mod: S$PBB,,, | Performed by: UROLOGY

## 2023-03-29 PROCEDURE — 3008F BODY MASS INDEX DOCD: CPT | Mod: CPTII,,, | Performed by: UROLOGY

## 2023-03-29 PROCEDURE — 3078F PR MOST RECENT DIASTOLIC BLOOD PRESSURE < 80 MM HG: ICD-10-PCS | Mod: CPTII,,, | Performed by: UROLOGY

## 2023-03-29 PROCEDURE — 99214 PR OFFICE/OUTPT VISIT, EST, LEVL IV, 30-39 MIN: ICD-10-PCS | Mod: S$PBB,,, | Performed by: UROLOGY

## 2023-03-29 PROCEDURE — 3074F SYST BP LT 130 MM HG: CPT | Mod: CPTII,,, | Performed by: UROLOGY

## 2023-03-29 PROCEDURE — 1159F PR MEDICATION LIST DOCUMENTED IN MEDICAL RECORD: ICD-10-PCS | Mod: CPTII,,, | Performed by: UROLOGY

## 2023-03-29 PROCEDURE — 99999 PR PBB SHADOW E&M-EST. PATIENT-LVL III: ICD-10-PCS | Mod: PBBFAC,,, | Performed by: UROLOGY

## 2023-03-29 NOTE — TELEPHONE ENCOUNTER
Thank you.    ----- Message from James Vega MD sent at 3/29/2023 10:36 AM CDT -----  Regarding: RE: IR in the OR - consult placed for Right PCNL, R neph tube exchange please - Fri Apr 21, 2023  Procedure: R PCNL, new access  Procedure duration: 1 hour  Level of sedation: To be done in OR  [place]: To be done in OR at   Performing physician (if any specific): Any  Procedure room: To be done in OR   Clinic visit: Yes  Priority: Clinic prior to PCNL on 4/21  Snapboard comments: Jeb is referring MD  Other comments: N/A     ----- Message -----  From: Ashly Rebollar RN  Sent: 3/29/2023  10:11 AM CDT  To: James Vega MD, #  Subject: IR in the OR - consult placed for Right PCNL#    Hi Team IR,     Please see OR case for IR consult placed for Right PCNL, R neph tube exchange please -    Fri Apr 21, 2023    Thank you,    AMY Limon/KWAME Broderick

## 2023-03-29 NOTE — PROGRESS NOTES
CHIEF COMPLAINT:    Mrs. Riley is a 58 y.o. female presenting for a follow up on history of distal ureteral strictures secondary to XRT, patient is status post transverse colon conduit.      PRESENTING ILLNESS:    Edita Riley is a 58 y.o. female who is established to our clinic. She recently presented to the ED 3/23/23 for generalized abdominal pain, poorly draining right nephrostomy tube, and n/v. Urology consulted for new right-sided hydronephrosis.    History of distal ureteral strcitures due to XRT for cervical cancer.  She underwent a transverse colon conduit on 9/11/2020.  The patient had bilateral hydronephrosis with reflux on the right side and a sluggish ureter on the left side. Bilateral PCNTs since Apr 2021.  She underwent a percutaneous antegrade approach on 10/27/2022 for ureteral stones. Patient underwent bilateral PCNT exchange 3/28/23 with IR.    She had a CT scan done on one of her ER visits on 12/2/2022 which showed a 2 mm stone in the right ureter and a 6 mm stone in the lower pole.  The left NT was accidentally removed on 2/13/2023 and it was replaced on 2/14/2023.  The right was replaced at the same time, per IR's note. Return visit to the ED 3/1/23 for dislodged L NT which was replaced on 3/2/23.  CT shows bilateral nephrostomy tubes in correct anatomic position, new onset right sided hydronephrosis, no hydro on the left, 4mm stone in the mid right ureter with proximal hydroureter, small lower pole renal stones in the right kidney.       REVIEW OF SYSTEMS:    Review of Systems   Constitutional: Negative.    HENT: Negative.     Eyes: Negative.    Respiratory: Negative.     Cardiovascular: Negative.    Gastrointestinal: Negative.    Genitourinary: Negative.    Musculoskeletal: Negative.    Skin: Negative.    Neurological: Negative.    Endo/Heme/Allergies: Negative.    Psychiatric/Behavioral: Negative.       PATIENT HISTORY:    Past Medical History:   Diagnosis Date    Abnormal  mammogram 08/25/2020    Acute blood loss anemia     Acute deep vein thrombosis (DVT) of lower extremity 12/09/2020    Advance care planning 04/30/2021    Anemia due to chronic blood loss     Anemia due to chronic kidney disease     Anxiety     Bilateral ureteral obstruction 9/11/2020    Cardiovascular event risk -- low 09/14/2015    ASCVD 10-year risk 1.9% (with optimal risk factors 1.3%) as of 9/14/2015     Cervical cancer 2014    Chronic back pain     Colostomy care     Deep vein thrombosis     Depression     Diarrhea due to malabsorption 11/14/2018    Diarrhea due to malabsorption 11/14/2018    Difficult intubation     Disorder of kidney and ureter     DVT of lower extremity, bilateral 11/04/2020    Fibromyalgia     Fungemia 09/27/2020    Generalized abdominal pain 08/25/2020    GERD (gastroesophageal reflux disease)     Hemifacial spasm 09/16/2015    Hiatal hernia 2014    History of cervical cancer 10/11/2018    Hx of psychiatric care     Cymbalta, trazodone    Hypertension     Hypomagnesemia 11/21/2018    Lactose intolerance     Metastatic squamous cell carcinoma to lymph node 10/11/2018    Nephrostomy tube displaced     Neuropathy due to chemotherapeutic drug 11/14/2018    Osteoarthritis of back     Peritonitis 09/22/2020    Pseudomonas urinary tract infection 04/21/2021    Psychiatric problem     Refusal of blood transfusions as patient is [employer]     Schatzki's ring 09/14/2015    Seen on outside EGD 05/2014, underwent esophageal dilatation. Bx were negative.     Seizures     Sleep stage dysfunction     Noted on PSG 06/2017; negative for obstructive sleep apnea     Stroke     Urinary tract infection associated with nephrostomy catheter 06/11/2020    Wound infection after surgery 09/24/2020       Past Surgical History:   Procedure Laterality Date    ANTEGRADE NEPHROSTOGRAPHY Bilateral 9/28/2020    Procedure: Nephrostogram - antegrade;  Surgeon: Leslie Balbuena MD;  Location: [facility] OR [address] [place] [address];   Service: Urology;  Laterality: Bilateral;    ANTEGRADE NEPHROSTOGRAPHY Left 4/20/2021    Procedure: NEPHROSTOGRAM, ANTEGRADE;  Surgeon: Leslie Balbuena MD;  Location: [facility] OR 1ST FLR;  Service: Urology;  Laterality: Left;    ANTEGRADE NEPHROSTOGRAPHY Right 10/27/2022    Procedure: NEPHROSTOGRAM, ANTEGRADE;  Surgeon: Chirag Russ MD;  Location: [facility] OR 1ST FLR;  Service: Urology;  Laterality: Right;    BILATERAL OOPHORECTOMY  2015    CHOLECYSTECTOMY  11/09/2016    Done at [facility], path showed chronic cholecystitis and gallstones    cold knife conization  2014    COLECTOMY, RIGHT  9/17/2020    Procedure: COLECTOMY, RIGHT Extended;  Surgeon: Hammad Reynolds MD;  Location: [facility] OR 2ND FLR;  Service: Colon and Rectal;;    COLONOSCOPY  2014    COLONOSCOPY N/A 10/24/2016    at [facility], Dr Gutiérrez    COLONOSCOPY N/A 5/18/2018    Procedure: COLONOSCOPY;  Surgeon: Arden Gutiérrez MD;  Location: [facility] (4TH FLR);  Service: Endoscopy;  Laterality: N/A;    COLONOSCOPY N/A 7/28/2020    Procedure: COLONOSCOPY;  Surgeon: Hammad Reynolds MD;  Location: [facility] (4TH FLR);  Service: Colon and Rectal;  Laterality: N/A;  [place]id test [place] 7/[place]    CYSTOSCOPY WITH URETEROSCOPY, RETROGRADE PYELOGRAPHY, AND INSERTION OF STENT Bilateral 3/21/2020    Procedure: CYSTOSCOPY, WITH RETROGRADE PYELOGRAM,;  Surgeon: Leslie Balbuena MD;  Location: [facility] OR 1ST FLR;  Service: Urology;  Laterality: Bilateral;    DILATION OF NEPHROSTOMY TRACT Right 10/27/2022    Procedure: DILATION, NEPHROSTOMY TRACT;  Surgeon: Chirag Russ MD;  Location: [facility] OR 1ST FLR;  Service: Urology;  Laterality: Right;    ESOPHAGOGASTRODUODENOSCOPY  2014    ESOPHAGOGASTRODUODENOSCOPY  11/18/2020    ESOPHAGOGASTRODUODENOSCOPY N/A 11/18/2020    Procedure: ESOPHAGOGASTRODUODENOSCOPY (EGD);  Surgeon: Zenon Spencer MD;  Location: [facility];  Service: Endoscopy;  Laterality: N/A;    ESOPHAGOGASTRODUODENOSCOPY N/A 12/11/2020    Procedure: EGD  (ESOPHAGOGASTRODUODENOSCOPY);  Surgeon: Juancho Muse MD;  Location: [facility] ENDO (2ND FLR);  Service: Endoscopy;  Laterality: N/A;    HYSTERECTOMY  2014    [facility] for cervical cancer    ILEOSTOMY  9/17/2020    Procedure: CREATION, ILEOSTOMY;  Surgeon: Hammad Reynolds MD;  [facility] OR 2ND FLR;  Service: Colon and Rectal;;    LOOPOGRAM N/A 4/20/2021    Procedure: LOOPOGRAM;  Surgeon: Leslie Balbuena MD;  [facility] OR 1ST FLR;  Service: Urology;  Laterality: N/A;    MOBILIZATION OF SPLENIC FLEXURE N/A 9/11/2020    Procedure: MOBILIZATION, COLONIC;  Surgeon: Hammad Reynolds MD;  Location: [facility] OR 2ND FLR;  Service: Colon and Rectal;  Laterality: N/A;    NEPHROSTOGRAPHY Bilateral 4/17/2021    Procedure: Nephrostogram;  Surgeon: Celeste Surgeon;  Location: [facility];  Service: Anesthesiology;  Laterality: Bilateral;    OOPHORECTOMY      PYELOSCOPY Right 10/27/2022    Procedure: PYELOSCOPY;  Surgeon: Chirag Russ MD;  [facility];  Service: Urology;  Laterality: Right;    REPLACEMENT OF NEPHROSTOMY TUBE Right 10/27/2022    Procedure: REPLACEMENT, NEPHROSTOMY TUBE;  Surgeon: Chirag Russ MD;  [facility];  Service: Urology;  Laterality: Right;    RETROPERITONEAL LYMPHADENECTOMY Right 9/17/2018    Procedure: LYMPHADENECTOMY, RETROPERITONEUM;  Surgeon: Sebas Reed MD;  [facility];  Service: General;  Laterality: Right;  ILIAC    URETEROSCOPIC REMOVAL OF URETERIC CALCULUS Right 10/27/2022    Procedure: REMOVAL, CALCULUS, URETER, URETEROSCOPIC;  Surgeon: Chirag Russ MD;  Location: [facility] OR [facility] FLR;  Service: Urology;  Laterality: Right;    URETEROSCOPY Right 10/27/2022    Procedure: URETEROSCOPY-ANTEGRADE;  Surgeon: Chirag Russ MD;  [facility];  Service: Urology;  Laterality: Right;       Family History   Problem Relation Age of Onset    Heart disease Sister     Heart disease Maternal Grandmother     Colon cancer Father     Esophageal  cancer Father     Cancer Father         Lung-smoker    Cancer Mother         Cervical    Cervical cancer Mother     Breast cancer Maternal Aunt     Suicide Daughter         jumped from parking structure    Drug abuse Daughter     Drug abuse Daughter      Social History     Socioeconomic History    Marital status:      Spouse name: Hammad    Number of children: 2   Tobacco Use    Smoking status: Never    Smokeless tobacco: Never   Substance and Sexual Activity    Alcohol use: No     Alcohol/week: 0.0 standard drinks    Drug use: No    Sexual activity: Yes     Partners: Male     Birth control/protection: None     Comment:  19 years since    Social History Narrative    , twin daughters (1  2018), disabled due to childhood stroke, [employer]     Social Determinants of Health     Financial Resource Strain: Low Risk     Difficulty of Paying Living Expenses: Not very hard   Food Insecurity: No Food Insecurity    Worried About Running Out of Food in the Last Year: Never true    Ran Out of Food in the Last Year: Never true   Transportation Needs: No Transportation Needs    Lack of Transportation (Medical): No    Lack of Transportation (Non-Medical): No   Physical Activity: Inactive    Days of Exercise per Week: 0 days    Minutes of Exercise per Session: 0 min   Stress: No Stress Concern Present    Feeling of Stress : Only a little   Social Connections: Moderately Isolated    Frequency of Communication with Friends and Family: More than three times a week    Frequency of Social Gatherings with Friends and Family: More than three times a week    Attends L[employer] Services: Never    Active Member of Clubs or Organizations: No    Attends Club or Organization Meetings: Never    Marital Status:    Housing Stability: Low Risk     Unable to Pay for Housing in the Last Year: No    Number of Places Lived in the Last Year: 1    Unstable Housing in the Last Year: No        Allergies:  Bee sting [allergen ext-venom-honey bee] and Grass pollen-bermuda, standard    Medications:  Outpatient Encounter Medications as of 3/1/2023   Medication Sig Dispense Refill    acetaminophen (TYLENOL) 500 MG tablet Take 2 tablets (1,000 mg total) by mouth every 8 (eight) hours as needed for Pain. 90 tablet 0    [] ciprofloxacin HCl (CIPRO) 500 MG tablet Take 1 tablet (500 mg total) by mouth every 12 (twelve) hours. for 6 days 12 tablet 0    gabapentin (NEURONTIN) 100 MG capsule Take 2 capsules (200 mg total) by mouth every evening. 90 capsule 3    ketorolac (TORADOL) 10 mg tablet Take 1 tablet (10 mg total) by mouth every 6 (six) hours as needed for Pain. 30 tablet 0    melatonin (MELATIN) 3 mg tablet Take 2 tablets (6 mg total) by mouth nightly as needed for Insomnia. 60 tablet 0    mirtazapine (REMERON SOL-TAB) 15 MG disintegrating tablet Dissolve 1 tablet (15 mg total) by mouth nightly. 30 tablet 11    sodium bicarbonate 650 MG tablet Take 2 tablets (1,300 mg total) by mouth 2 (two) times daily. 120 tablet 11    sucralfate (CARAFATE) 1 gram tablet Take 1 tablet (1 g total) by mouth 4 (four) times daily before meals and nightly. 120 tablet 2           PHYSICAL EXAMINATION:    The patient generally appears in good health, is appropriately interactive, and is in no apparent distress.    Skin: No lesions.    Mental: Cooperative with normal affect.    Neuro: Grossly intact.    HEENT: Normal. No evidence of lymphadenopathy.    Chest:  normal inspiratory effort.    Abdomen: Soft, non-tender. No masses or organomegaly. Bladder is not palpable. No evidence of flank discomfort. No evidence of inguinal hernia.    Extremities: No clubbing, cyanosis, or edema    Both nephrostomy tubes are draining clear yellow urine  LABS:    Lab Results   Component Value Date    BUN 11 2023    CREATININE 1.1 2023       IMPRESSION:    Left hydronephrosis, right kidney and ureteral calculi.      PLAN:    -reviewed recent CT scan imaging with Interventional Radiology.  Right nephrostomy tube appears to be bypassing the renal parenchyma and directly entering the renal pelvis.  This is suboptimal for definitive stone management of her lower pole stone burden in addition to the ureteral stone on the right side.  Therefore, we will plan for a new stick in the operating room with Interventional Radiology present and we will perform a percutaneous nephrolithotomy along with an antegrade right ureteroscopy and stone basket extraction.

## 2023-03-29 NOTE — TELEPHONE ENCOUNTER
----- Message from Ashly Rebollar RN sent at 3/29/2023 10:14 AM CDT -----  Regarding: Jeb surgery Fri Apr 21, 2023 - need surgery clearance please  Hi Team Sujata- Op (& Chantal),    This pt is scheduled for Dr. Russ for Right PCNL, right nephrostomy tube exchange surgery Fri Apr 21, 2023 - need surgery clearance please.    Thank you,    AMY Limon/CHARLIE Broderick.

## 2023-03-30 ENCOUNTER — TELEPHONE (OUTPATIENT)
Dept: CARDIOLOGY | Facility: CLINIC | Age: 58
End: 2023-03-30
Payer: MEDICAID

## 2023-03-30 NOTE — TELEPHONE ENCOUNTER
----- Message from Chantal Plata RN sent at 3/30/2023  2:11 PM CDT -----  Hello Provider,       Your patient indicated above requires Pre-Op Clearance/ Risk Stratification for a Percutaneous Nephrolithotomy with Dr. Russ on 4/21/2023 (General anesthesia, 180 minutes).  Anesthesia review is in progress.    If a chart review is appropriate for clearance, please indicate in a note in the EMR.       If not, please advise timely, so that your clinic may schedule an appointment.  If any diagnostics are required please feel free to initiate.       Thank you,  Chantal Plata, AMY  [facility], [facility]

## 2023-03-30 NOTE — TELEPHONE ENCOUNTER
Patient denies having any chest pain since last appointment.  Complains of SOB from time to time.  Patient not taking blood pressures @ home, but taken @ doctor appointments.

## 2023-03-31 NOTE — TELEPHONE ENCOUNTER
Informed patient, cardiology appointment needed for pre op.  Appointment with SARAH Valle on 4/4/23 @ 9:00 AM, patient accepted appointment.

## 2023-04-04 ENCOUNTER — OFFICE VISIT (OUTPATIENT)
Dept: CARDIOLOGY | Facility: CLINIC | Age: 58
End: 2023-04-04
Payer: MEDICAID

## 2023-04-04 VITALS
HEIGHT: 69 IN | DIASTOLIC BLOOD PRESSURE: 54 MMHG | OXYGEN SATURATION: 99 % | SYSTOLIC BLOOD PRESSURE: 98 MMHG | WEIGHT: 187.63 LBS | HEART RATE: 70 BPM | BODY MASS INDEX: 27.79 KG/M2

## 2023-04-04 DIAGNOSIS — Z01.810 PREPROCEDURAL CARDIOVASCULAR EXAMINATION: Primary | ICD-10-CM

## 2023-04-04 DIAGNOSIS — R07.9 CHEST PAIN, UNSPECIFIED TYPE: ICD-10-CM

## 2023-04-04 PROCEDURE — 93010 ELECTROCARDIOGRAM REPORT: CPT | Mod: S$PBB,,, | Performed by: INTERNAL MEDICINE

## 2023-04-04 PROCEDURE — 93010 EKG 12-LEAD: ICD-10-PCS | Mod: S$PBB,,, | Performed by: INTERNAL MEDICINE

## 2023-04-04 PROCEDURE — 99213 OFFICE O/P EST LOW 20 MIN: CPT | Mod: S$PBB,,, | Performed by: NURSE PRACTITIONER

## 2023-04-04 PROCEDURE — 99214 OFFICE O/P EST MOD 30 MIN: CPT | Mod: PBBFAC,PN | Performed by: NURSE PRACTITIONER

## 2023-04-04 PROCEDURE — 93005 ELECTROCARDIOGRAM TRACING: CPT | Mod: PBBFAC,PN | Performed by: INTERNAL MEDICINE

## 2023-04-04 PROCEDURE — 99999 PR PBB SHADOW E&M-EST. PATIENT-LVL IV: ICD-10-PCS | Mod: PBBFAC,,, | Performed by: NURSE PRACTITIONER

## 2023-04-04 PROCEDURE — 3074F PR MOST RECENT SYSTOLIC BLOOD PRESSURE < 130 MM HG: ICD-10-PCS | Mod: CPTII,,, | Performed by: NURSE PRACTITIONER

## 2023-04-04 PROCEDURE — 1159F PR MEDICATION LIST DOCUMENTED IN MEDICAL RECORD: ICD-10-PCS | Mod: CPTII,,, | Performed by: NURSE PRACTITIONER

## 2023-04-04 PROCEDURE — 3008F PR BODY MASS INDEX (BMI) DOCUMENTED: ICD-10-PCS | Mod: CPTII,,, | Performed by: NURSE PRACTITIONER

## 2023-04-04 PROCEDURE — 3074F SYST BP LT 130 MM HG: CPT | Mod: CPTII,,, | Performed by: NURSE PRACTITIONER

## 2023-04-04 PROCEDURE — 1160F PR REVIEW ALL MEDS BY PRESCRIBER/CLIN PHARMACIST DOCUMENTED: ICD-10-PCS | Mod: CPTII,,, | Performed by: NURSE PRACTITIONER

## 2023-04-04 PROCEDURE — 1159F MED LIST DOCD IN RCRD: CPT | Mod: CPTII,,, | Performed by: NURSE PRACTITIONER

## 2023-04-04 PROCEDURE — 1160F RVW MEDS BY RX/DR IN RCRD: CPT | Mod: CPTII,,, | Performed by: NURSE PRACTITIONER

## 2023-04-04 PROCEDURE — 99999 PR PBB SHADOW E&M-EST. PATIENT-LVL IV: CPT | Mod: PBBFAC,,, | Performed by: NURSE PRACTITIONER

## 2023-04-04 PROCEDURE — 3008F BODY MASS INDEX DOCD: CPT | Mod: CPTII,,, | Performed by: NURSE PRACTITIONER

## 2023-04-04 PROCEDURE — 3078F PR MOST RECENT DIASTOLIC BLOOD PRESSURE < 80 MM HG: ICD-10-PCS | Mod: CPTII,,, | Performed by: NURSE PRACTITIONER

## 2023-04-04 PROCEDURE — 3078F DIAST BP <80 MM HG: CPT | Mod: CPTII,,, | Performed by: NURSE PRACTITIONER

## 2023-04-04 PROCEDURE — 99213 PR OFFICE/OUTPT VISIT, EST, LEVL III, 20-29 MIN: ICD-10-PCS | Mod: S$PBB,,, | Performed by: NURSE PRACTITIONER

## 2023-04-04 NOTE — PROGRESS NOTES
Cardiology    4/4/2023  9:01 AM    Problem list  Patient Active Problem List   Diagnosis    Cervical cancer    Osteoarthritis of back    History of essential hypertension    Lower extremity pain, diffuse    Weakness of both lower extremities    Impaired functional mobility, balance, gait, and endurance    Colon polyp    Internal hemorrhoids    Severe episode of recurrent major depressive disorder, with psychotic features    Fibromyalgia    Carpal tunnel syndrome on right    History of cervical cancer    Metastatic squamous cell carcinoma to lymph node    Generalized anxiety disorder    PTSD (post-traumatic stress disorder)    Neuropathy due to chemotherapeutic drug    Seizure-like activity    Anemia due to chronic kidney disease    Hydronephrosis    Family history of colon cancer    Abnormal mammogram    Radiation cystitis    Bilateral ureteral obstruction    Moderate malnutrition    Anemia due to chronic blood loss    Vitamin D deficiency    Ileostomy in place    History of DVT (deep vein thrombosis)    Presence of urostomy    QT prolongation    Metabolic acidosis    Hard to intubate    Moderate episode of recurrent major depressive disorder    Nephrostomy status    Depression    Physical deconditioning    Complicated UTI (urinary tract infection)    Chest pain    Dyspnea on exertion    Preprocedural cardiovascular examination    CKD (chronic kidney disease), stage III    Migraine without aura and without status migrainosus, not intractable       CC:  Preprocedural eval    HPI:  Dizziness with position changes, does not happene evry day.  Had ED visit for CP- had a GI cocktail and felt much better.  No further episodes.     Medications  Current Outpatient Medications   Medication Sig Dispense Refill    gabapentin (NEURONTIN) 100 MG capsule Take 2 capsules (200 mg total) by mouth every evening. 90 capsule 3    ketorolac (TORADOL) 10 mg tablet Take 1 tablet (10 mg total) by mouth every 6 (six) hours as needed  for Pain. 30 tablet 0    melatonin (MELATIN) 3 mg tablet Take 2 tablets (6 mg total) by mouth nightly as needed for Insomnia. 60 tablet 0    mirtazapine (REMERON SOL-TAB) 15 MG disintegrating tablet Dissolve 1 tablet (15 mg total) by mouth nightly. 30 tablet 11    sodium bicarbonate 650 MG tablet Take 2 tablets (1,300 mg total) by mouth 2 (two) times daily. 120 tablet 11    sucralfate (CARAFATE) 1 gram tablet Take 1 tablet (1 g total) by mouth 4 (four) times daily before meals and nightly. 120 tablet 2    traMADoL (ULTRAM) 50 mg tablet Take 1 tablet (50 mg total) by mouth every 6 (six) hours. 5 tablet 0     No current facility-administered medications for this visit.      Prior to Admission medications    Medication Sig Start Date End Date Taking? Authorizing Provider   gabapentin (NEURONTIN) 100 MG capsule Take 2 capsules (200 mg total) by mouth every evening. 9/1/22  Yes Danuta Barboza MD   ketorolac (TORADOL) 10 mg tablet Take 1 tablet (10 mg total) by mouth every 6 (six) hours as needed for Pain. 9/8/22  Yes Denise Brooks NP   melatonin (MELATIN) 3 mg tablet Take 2 tablets (6 mg total) by mouth nightly as needed for Insomnia. 9/6/21  Yes Kacey Bergeron MD   mirtazapine (REMERON SOL-TAB) 15 MG disintegrating tablet Dissolve 1 tablet (15 mg total) by mouth nightly. 2/15/22 4/4/23 Yes Diony Ram MD   sodium bicarbonate 650 MG tablet Take 2 tablets (1,300 mg total) by mouth 2 (two) times daily. 2/15/22 4/4/23 Yes Diony Ram MD   sucralfate (CARAFATE) 1 gram tablet Take 1 tablet (1 g total) by mouth 4 (four) times daily before meals and nightly. 9/1/22  Yes Danuta Barboza MD   traMADoL (ULTRAM) 50 mg tablet Take 1 tablet (50 mg total) by mouth every 6 (six) hours. 3/2/23  Yes James Guzman MD         History  Past Medical History:   Diagnosis Date    Abnormal mammogram 08/25/2020    Acute blood loss anemia     Acute deep vein thrombosis (DVT) of lower extremity 12/09/2020    Advance  care planning 04/30/2021    Anemia due to chronic blood loss     Anemia due to chronic kidney disease     Anxiety     Bilateral ureteral obstruction 9/11/2020    Cardiovascular event risk -- low 09/14/2015    ASCVD 10-year risk 1.9% (with optimal risk factors 1.3%) as of 9/14/2015     Cervical cancer 2014    Chronic back pain     Colostomy care     Deep vein thrombosis     Depression     Diarrhea due to malabsorption 11/14/2018    Diarrhea due to malabsorption 11/14/2018    Difficult intubation     Disorder of kidney and ureter     DVT of lower extremity, bilateral 11/04/2020    Fibromyalgia     Fungemia 09/27/2020    Generalized abdominal pain 08/25/2020    GERD (gastroesophageal reflux disease)     Hemifacial spasm 09/16/2015    Hiatal hernia 2014    History of cervical cancer 10/11/2018    Hx of psychiatric care     Cymbalta, trazodone    Hypertension     Hypomagnesemia 11/21/2018    Lactose intolerance     Metastatic squamous cell carcinoma to lymph node 10/11/2018    Nephrostomy tube displaced     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 Bx were negative.     Seizures     Sleep stage dysfunction     Noted on PSG 06/2017; negative for obstructive sleep apnea     Stroke     Urinary tract infection associated with nephrostomy catheter 06/11/2020    Wound infection after surgery 09/24/2020     Past Surgical History:   Procedure Laterality Date    ANTEGRADE NEPHROSTOGRAPHY Bilateral 9/28/2020    Procedure: Nephrostogram - antegrade;  Surgeon: Leslie Balbuena MD;  Location: [facility] OR [address] [place] [address];  Service: Urology;  Laterality: Bilateral;    ANTEGRADE NEPHROSTOGRAPHY Left 4/20/2021    Procedure: NEPHROSTOGRAM, ANTEGRADE;   Surgeon: Leslie Balbuena MD;  [facility];  Service: Urology;  Laterality: Left;    ANTEGRADE NEPHROSTOGRAPHY Right 10/27/2022    Procedure: NEPHROSTOGRAM, ANTEGRADE;  Surgeon: Chirag Russ MD;  [facility];  Service: Urology;  Laterality: Right;    BILATERAL OOPHORECTOMY  2015    CHOLECYSTECTOMY  11/09/2016    Done at [facility], path showed chronic cholecystitis and gallstones    cold knife conization  2014    COLECTOMY, RIGHT  9/17/2020    Procedure: COLECTOMY, RIGHT Extended;  Surgeon: Hammad Reynolds MD;  Location: [facility] OR 2ND FLR;  Service: Colon and Rectal;;    COLONOSCOPY  2014    COLONOSCOPY N/A 10/24/2016    at [facility], Dr Gutiérrez    COLONOSCOPY N/A 5/18/2018    Procedure: COLONOSCOPY;  Surgeon: Arden Gutiérrez MD;  Location: [facility] (4TH FLR);  Service: Endoscopy;  Laterality: N/A;    COLONOSCOPY N/A 7/28/2020    Procedure: COLONOSCOPY;  Surgeon: Hammad Reynolds MD;  Location: [facility] (4TH FLR);  Service: Colon and Rectal;  Laterality: N/A;  covid test [place] 7/25    CYSTOSCOPY WITH URETEROSCOPY, RETROGRADE PYELOGRAPHY, AND INSERTION OF STENT Bilateral 3/21/2020    Procedure: CYSTOSCOPY, WITH RETROGRADE PYELOGRAM,;  Surgeon: Leslie Balbuena MD;  [facility];  Service: Urology;  Laterality: Bilateral;    DILATION OF NEPHROSTOMY TRACT Right 10/27/2022    Procedure: DILATION, NEPHROSTOMY TRACT;  Surgeon: Chirag Russ MD;  [facility];  Service: Urology;  Laterality: Right;    ESOPHAGOGASTRODUODENOSCOPY  2014    ESOPHAGOGASTRODUODENOSCOPY  11/18/2020    ESOPHAGOGASTRODUODENOSCOPY N/A 11/18/2020    Procedure: ESOPHAGOGASTRODUODENOSCOPY (EGD);  Surgeon: Zenon Spencer MD;  Location: [facility];  Service: Endoscopy;  Laterality: N/A;    ESOPHAGOGASTRODUODENOSCOPY N/A 12/11/2020    Procedure: EGD (ESOPHAGOGASTRODUODENOSCOPY);  Surgeon: Juancho Muse MD;  Location: [facility] (2ND FLR);  Service: Endoscopy;  Laterality: N/A;     HYSTERECTOMY  2014    [facility] for cervical cancer    ILEOSTOMY  9/17/2020    Procedure: CREATION, ILEOSTOMY;  Surgeon: Hammad Reynolds MD;  Location: [facility] OR 2ND FLR;  Service: Colon and Rectal;;    LOOPOGRAM N/A 4/20/2021    Procedure: LOOPOGRAM;  Surgeon: Leslie Balbuena MD;  Location: [facility] OR 1ST FLR;  Service: Urology;  Laterality: N/A;    MOBILIZATION OF SPLENIC FLEXURE N/A 9/11/2020    Procedure: MOBILIZATION, COLONIC;  Surgeon: Hammad Reynolds MD;  Location: [facility] OR 2ND FLR;  Service: Colon and Rectal;  Laterality: N/A;    NEPHROSTOGRAPHY Bilateral 4/17/2021    Procedure: Nephrostogram;  Surgeon: Celeste Surgeon;  Location: [facility];  Service: Anesthesiology;  Laterality: Bilateral;    OOPHORECTOMY      PYELOSCOPY Right 10/27/2022    Procedure: PYELOSCOPY;  Surgeon: Chirag Russ MD;  [facility];  Service: Urology;  Laterality: Right;    REPLACEMENT OF NEPHROSTOMY TUBE Right 10/27/2022    Procedure: REPLACEMENT, NEPHROSTOMY TUBE;  Surgeon: Chirag Russ MD;  [facility];  Service: Urology;  Laterality: Right;    RETROPERITONEAL LYMPHADENECTOMY Right 9/17/2018    Procedure: LYMPHADENECTOMY, RETROPERITONEUM;  Surgeon: Sebas Reed MD;  Location: [facility] OR 2ND FLR;  Service: General;  Laterality: Right;  ILIAC    URETEROSCOPIC REMOVAL OF URETERIC CALCULUS Right 10/27/2022    Procedure: REMOVAL, CALCULUS, URETER, URETEROSCOPIC;  Surgeon: Chirag Russ MD;  [facility];  Service: Urology;  Laterality: Right;    URETEROSCOPY Right 10/27/2022    Procedure: URETEROSCOPY-ANTEGRADE;  Surgeon: Chirag Russ MD;  [facility];  Service: Urology;  Laterality: Right;     Social History     Socioeconomic History    Marital status:      Spouse name: Hammad    Number of children: 2   Tobacco Use    Smoking status: Never    Smokeless tobacco: Never   Substance and Sexual Activity    Alcohol use: No     Alcohol/week: 0.0 standard drinks    Drug  use: No    Sexual activity: Yes     Partners: Male     Birth control/protection: None     Comment:  19 years since    Social History Narrative    , twin daughters (1  2018), disabled due to childhood stroke, Jewish sophia     Social Determinants of Health     Financial Resource Strain: Low Risk     Difficulty of Paying Living Expenses: Not very hard   Food Insecurity: No Food Insecurity    Worried About Running Out of Food in the Last Year: Never true    Ran Out of Food in the Last Year: Never true   Transportation Needs: No Transportation Needs    Lack of Transportation (Medical): No    Lack of Transportation (Non-Medical): No   Physical Activity: Inactive    Days of Exercise per Week: 0 days    Minutes of Exercise per Session: 0 min   Stress: No Stress Concern Present    Feeling of Stress : Only a little   Social Connections: Moderately Isolated    Frequency of Communication with Friends and Family: More than three times a week    Frequency of Social Gatherings with Friends and Family: More than three times a week    Attends Buddhist Services: Never    Active Member of Clubs or Organizations: No    Attends Club or Organization Meetings: Never    Marital Status:    Housing Stability: Low Risk     Unable to Pay for Housing in the Last Year: No    Number of Places Lived in the Last Year: 1    Unstable Housing in the Last Year: No         Allergies  Review of patient's allergies indicates:   Allergen Reactions    Bee sting [allergen ext-venom-honey bee]      Rash      Grass pollen-bermuda, standard      rash         Review of Systems   Review of Systems   Constitutional: Negative for diaphoresis and malaise/fatigue.   HENT: Negative.     Cardiovascular:  Negative for chest pain, claudication, dyspnea on exertion, irregular heartbeat, leg swelling, near-syncope, orthopnea, palpitations, paroxysmal nocturnal dyspnea and syncope.   Respiratory:  Negative for shortness of  breath.    Endocrine: Negative for polydipsia, polyphagia and polyuria.   Hematologic/Lymphatic: Does not bruise/bleed easily.   Gastrointestinal:  Negative for bloating, nausea and vomiting.   Genitourinary: Negative.    Neurological:  Negative for excessive daytime sleepiness, dizziness, light-headedness, loss of balance and weakness.   Psychiatric/Behavioral:  The patient is not nervous/anxious.    Allergic/Immunologic: Negative.        Physical Exam  Wt Readings from Last 1 Encounters:   04/04/23 85.1 kg (187 lb 9.8 oz)     BP Readings from Last 3 Encounters:   04/04/23 (!) 98/54   03/29/23 114/66   03/28/23 (!) 100/55     Pulse Readings from Last 1 Encounters:   04/04/23 70     Body mass index is 27.71 kg/m².    Physical Exam  Vitals and nursing note reviewed.   Constitutional:       Appearance: Normal appearance.   HENT:      Head: Normocephalic and atraumatic.      Mouth/Throat:      Mouth: Mucous membranes are moist.   Eyes:      Pupils: Pupils are equal, round, and reactive to light.   Cardiovascular:      Rate and Rhythm: Normal rate and regular rhythm.      Pulses:           Radial pulses are 2+ on the right side and 2+ on the left side.        Dorsalis pedis pulses are 2+ on the right side and 2+ on the left side.        Posterior tibial pulses are 2+ on the right side and 2+ on the left side.      Heart sounds: No murmur heard.  Pulmonary:      Effort: Pulmonary effort is normal. No respiratory distress.      Breath sounds: Normal breath sounds.   Abdominal:      General: There is no distension.      Tenderness: There is no abdominal tenderness.      Comments: Ostomy bags present   Musculoskeletal:      Cervical back: Normal range of motion.      Right lower leg: No edema.      Left lower leg: No edema.   Skin:     General: Skin is warm and dry.      Findings: No erythema.   Neurological:      General: No focal deficit present.      Mental Status: She is alert.   Psychiatric:         Mood and Affect:  Mood normal.         Behavior: Behavior normal.       Problem List Items Addressed This Visit          Cardiac/Vascular    Chest pain    Overview     Had recent episode that was relieved by Tylenol  Last Stress test negative  EKG normal today  No chest pain since  or any anginal equivalent  Pain may have been musculoskeletal or GI, do not suspect any cardiac etiology             Current Assessment & Plan     As above           Preprocedural cardiovascular examination - Primary    Overview     Stress test negative for ischemia  Do not recommend against proceeding with nephrolithotomy from cardiac perspective  Do recommend she contact PCP/GI to discuss continued  increased stool output from stoma             Current Assessment & Plan     As above           Relevant Orders    IN OFFICE EKG 12-LEAD (to [place])                   Follow Up        @Sherlyn Valle DNP

## 2023-04-04 NOTE — PATIENT INSTRUCTIONS
Your blood pressure looks stable    I would recommend slow position changes to see if that helps    No recommendations from a cardiac standpoint against proceeding with urology procedure

## 2023-04-06 NOTE — PROGRESS NOTES
[facility]  Urology  Progress Note    Patient Name: Edita Riley  MRN: [id number]  Admission Date: 9/11/2020  Hospital Length of Stay: 16 days  Code Status: Full Code   Attending Provider: Hammad Reynolds MD   Primary Care Physician: Audrey Mustafa MD    Subjective:     HPI:  Edita Riley is a 55 y.o. female with a history of cervical cancer s/p pelvic radiation. She has since developed bilateral ureteral strictures and bilateral hydronephrosis R>L. She had a MAG3 scan confirmed presence of bilateral obstruction. She is s/p open transverse colon conduit urinary diversion on 9/11/2020.     Interval History: NAEON. Fever yesterday to Tmax 100.9. Now afebrile.  ORESTES output 30 cc. Tolerating regular diet, pain controlled, ambulating. TF running.  Midline wound with wound vac, 120 cc output.  Creatinine increased to 2.0.   Right nephrostomy tube unclamped with good UOP.  Blood culture with yeast. Added diflucan. On zosyn.  Sitting comfortably in chair. Feels great.    Objective:     Temp:  [96.5 °F (35.8 °C)-100.9 °F (38.3 °C)] 98.6 °F (37 °C)  Pulse:  [] 99  Resp:  [18-20] 20  SpO2:  [96 %-99 %] 97 %  BP: (118-142)/(57-84) 125/77     Body mass index is 28.94 kg/m².           Drains     Drain                 Nephrostomy 08/13/20 0805 Left 12 Fr. 42 days         Nephrostomy 08/13/20 0809 Right 12 Fr. 42 days         Urostomy 09/11/20 ileal conduit LLQ 14 days         Ileostomy 09/18/20 RLQ 7 days         Nephrostomy 09/18/20 0025 Left 7 days         Nephrostomy 09/18/20 0025 Right 7 days         Closed/Suction Drain 09/23/20 0720 Right Abdomen Bulb 10 Fr. 1 day                Physical Exam   Constitutional: No distress.   HENT:   Head: Normocephalic and atraumatic.   Eyes: Conjunctivae are normal. No scleral icterus.   Neck: Normal range of motion.   Cardiovascular: Normal rate.    Pulmonary/Chest: Effort normal. No respiratory distress.   Abdominal: Soft. She exhibits no  Patient attended Phase 2 Cardiac Rehab Exercise Session. Further documentation will be scanned into the medical record upon discharge.     distension. There is abdominal tenderness (appropriate). There is no rebound and no guarding.   Urostomy p/p/p draining clear yellow urine  Ureteral stents in place  Ileostomy with dark liquid fluid output  IR drain LLQ draining light yellow clear fluid  Midline incision with staples, opened at superior and inferior ends with wound vac in    Musculoskeletal: Normal range of motion.      Comments: SCDs in place   Neurological: She is alert.   Skin: Skin is warm and dry. She is not diaphoretic.         Significant Labs:    BMP:  Recent Labs   Lab 09/25/20  1531 09/26/20  0520 09/27/20  0444   * 135* 136   K 3.9 4.3 4.8    109 108   CO2 20* 18* 16*   BUN 23* 22* 25*   CREATININE 1.7* 1.7* 2.0*   CALCIUM 8.0* 7.9* 8.2*       CBC:   Recent Labs   Lab 09/25/20  1531 09/26/20  0520 09/27/20  0444   WBC 20.70* 18.71* 15.95*   HGB 6.7* 6.4* 7.1*   HCT 22.1* 21.5* 24.0*   * 483* 459*       All pertinent labs results from the past 24 hours have been reviewed.    Significant Imaging:  All pertinent imaging results/findings from the past 24 hours have been reviewed.            Assessment/Plan:     * Bilateral ureteral obstruction  S/p urinary diversion with colon conduit 9/11   S/p emergent ex-lap 9/17, colo-colic anastomosis intact, bowel perforation found, underwent resection of right colon, remaining transverses colon, and proximal descending colon, ileostomy creation, Charlotte's pouch created     - CRS primary  - regular diet per Primary  - continue PT/OT, OOBTC as tolerated, encourage ambulation  - Wound care ostomy consulted for assistance with ostomy teaching   - patient is a Sabianist; Hgb at 7.1, management per primary and Heme-Onc (on EPO, B12, and iron)  - Drains: maintain, IR drain (Cr 0.7), ureteral stents  - Blood culture with yeast: on diflucan, ID consulted  - Prophylaxis: IS, SCDs, GI ppx        VTE Risk Mitigation (From admission, onward)         Ordered     enoxaparin injection 40 mg   Every 24 hours      09/24/20 0544     IP VTE HIGH RISK PATIENT  Once      09/11/20 2024     Place sequential compression device  Until discontinued      09/11/20 2024                Nolan Hartman MD  Urology  [facility]

## 2023-04-10 ENCOUNTER — HOSPITAL ENCOUNTER (OUTPATIENT)
Dept: PREADMISSION TESTING | Facility: HOSPITAL | Age: 58
Discharge: HOME | End: 2023-04-10
Attending: UROLOGY
Payer: MEDICAID

## 2023-04-10 ENCOUNTER — TELEPHONE (OUTPATIENT)
Dept: UROLOGY | Facility: CLINIC | Age: 58
End: 2023-04-10

## 2023-04-10 ENCOUNTER — OFFICE VISIT (OUTPATIENT)
Dept: INTERNAL MEDICINE | Facility: CLINIC | Age: 58
End: 2023-04-10
Payer: MEDICAID

## 2023-04-10 ENCOUNTER — TELEPHONE (OUTPATIENT)
Dept: INTERNAL MEDICINE | Facility: CLINIC | Age: 58
End: 2023-04-10
Payer: MEDICAID

## 2023-04-10 ENCOUNTER — OFFICE VISIT (OUTPATIENT)
Dept: UROLOGY | Facility: CLINIC | Age: 58
End: 2023-04-10
Payer: MEDICAID

## 2023-04-10 VITALS
WEIGHT: 188.13 LBS | TEMPERATURE: 98 F | OXYGEN SATURATION: 98 % | SYSTOLIC BLOOD PRESSURE: 108 MMHG | HEART RATE: 78 BPM | DIASTOLIC BLOOD PRESSURE: 71 MMHG | BODY MASS INDEX: 27.86 KG/M2 | RESPIRATION RATE: 16 BRPM | HEIGHT: 69 IN

## 2023-04-10 DIAGNOSIS — J43.9 PULMONARY EMPHYSEMA, UNSPECIFIED EMPHYSEMA TYPE: ICD-10-CM

## 2023-04-10 DIAGNOSIS — Z86.718 HISTORY OF DVT (DEEP VEIN THROMBOSIS): Chronic | ICD-10-CM

## 2023-04-10 DIAGNOSIS — T45.1X5A NEUROPATHY DUE TO CHEMOTHERAPEUTIC DRUG: ICD-10-CM

## 2023-04-10 DIAGNOSIS — G43.009 MIGRAINE WITHOUT AURA AND WITHOUT STATUS MIGRAINOSUS, NOT INTRACTABLE: ICD-10-CM

## 2023-04-10 DIAGNOSIS — R60.9 EDEMA, UNSPECIFIED TYPE: ICD-10-CM

## 2023-04-10 DIAGNOSIS — K64.8 INTERNAL HEMORRHOIDS: ICD-10-CM

## 2023-04-10 DIAGNOSIS — N18.31 STAGE 3A CHRONIC KIDNEY DISEASE: ICD-10-CM

## 2023-04-10 DIAGNOSIS — Z53.1 REFUSAL OF BLOOD TRANSFUSIONS AS PATIENT IS JEHOVAH'S WITNESS: ICD-10-CM

## 2023-04-10 DIAGNOSIS — G56.01 CARPAL TUNNEL SYNDROME ON RIGHT: ICD-10-CM

## 2023-04-10 DIAGNOSIS — M19.90 ARTHRITIS: ICD-10-CM

## 2023-04-10 DIAGNOSIS — F32.A DEPRESSION, UNSPECIFIED DEPRESSION TYPE: Chronic | ICD-10-CM

## 2023-04-10 DIAGNOSIS — N13.5 BILATERAL URETERAL OBSTRUCTION: Primary | ICD-10-CM

## 2023-04-10 DIAGNOSIS — N18.9 ANEMIA DUE TO CHRONIC KIDNEY DISEASE, UNSPECIFIED CKD STAGE: ICD-10-CM

## 2023-04-10 DIAGNOSIS — Z85.41 HISTORY OF CERVICAL CANCER: Chronic | ICD-10-CM

## 2023-04-10 DIAGNOSIS — D63.1 ANEMIA DUE TO CHRONIC KIDNEY DISEASE, UNSPECIFIED CKD STAGE: ICD-10-CM

## 2023-04-10 DIAGNOSIS — G62.0 NEUROPATHY DUE TO CHEMOTHERAPEUTIC DRUG: ICD-10-CM

## 2023-04-10 DIAGNOSIS — Z86.79 HISTORY OF ESSENTIAL HYPERTENSION: ICD-10-CM

## 2023-04-10 DIAGNOSIS — Z93.6 NEPHROSTOMY STATUS: Chronic | ICD-10-CM

## 2023-04-10 PROBLEM — R06.09 DYSPNEA ON EXERTION: Status: RESOLVED | Noted: 2022-10-14 | Resolved: 2023-04-10

## 2023-04-10 PROBLEM — R07.9 CHEST PAIN: Status: RESOLVED | Noted: 2021-09-13 | Resolved: 2023-04-10

## 2023-04-10 PROCEDURE — 3078F PR MOST RECENT DIASTOLIC BLOOD PRESSURE < 80 MM HG: ICD-10-PCS | Mod: CPTII,,, | Performed by: HOSPITALIST

## 2023-04-10 PROCEDURE — 87186 SC STD MICRODIL/AGAR DIL: CPT | Mod: 59 | Performed by: STUDENT IN AN ORGANIZED HEALTH CARE EDUCATION/TRAINING PROGRAM

## 2023-04-10 PROCEDURE — 3074F SYST BP LT 130 MM HG: CPT | Mod: CPTII,,, | Performed by: HOSPITALIST

## 2023-04-10 PROCEDURE — 3078F DIAST BP <80 MM HG: CPT | Mod: CPTII,,, | Performed by: HOSPITALIST

## 2023-04-10 PROCEDURE — 99499 UNLISTED E&M SERVICE: CPT | Mod: S$PBB,,, | Performed by: UROLOGY

## 2023-04-10 PROCEDURE — 99999 PR PBB SHADOW E&M-EST. PATIENT-LVL III: CPT | Mod: PBBFAC,,, | Performed by: HOSPITALIST

## 2023-04-10 PROCEDURE — 87088 URINE BACTERIA CULTURE: CPT | Mod: 59 | Performed by: STUDENT IN AN ORGANIZED HEALTH CARE EDUCATION/TRAINING PROGRAM

## 2023-04-10 PROCEDURE — 3074F PR MOST RECENT SYSTOLIC BLOOD PRESSURE < 130 MM HG: ICD-10-PCS | Mod: CPTII,,, | Performed by: HOSPITALIST

## 2023-04-10 PROCEDURE — 99215 OFFICE O/P EST HI 40 MIN: CPT | Mod: S$PBB,,, | Performed by: HOSPITALIST

## 2023-04-10 PROCEDURE — 1160F PR REVIEW ALL MEDS BY PRESCRIBER/CLIN PHARMACIST DOCUMENTED: ICD-10-PCS | Mod: CPTII,,, | Performed by: HOSPITALIST

## 2023-04-10 PROCEDURE — 99499 NO LOS: ICD-10-PCS | Mod: S$PBB,,, | Performed by: UROLOGY

## 2023-04-10 PROCEDURE — 99213 OFFICE O/P EST LOW 20 MIN: CPT | Mod: PBBFAC | Performed by: HOSPITALIST

## 2023-04-10 PROCEDURE — 1159F PR MEDICATION LIST DOCUMENTED IN MEDICAL RECORD: ICD-10-PCS | Mod: CPTII,,, | Performed by: HOSPITALIST

## 2023-04-10 PROCEDURE — 87086 URINE CULTURE/COLONY COUNT: CPT | Performed by: STUDENT IN AN ORGANIZED HEALTH CARE EDUCATION/TRAINING PROGRAM

## 2023-04-10 PROCEDURE — 3008F PR BODY MASS INDEX (BMI) DOCUMENTED: ICD-10-PCS | Mod: CPTII,,, | Performed by: HOSPITALIST

## 2023-04-10 PROCEDURE — 1160F RVW MEDS BY RX/DR IN RCRD: CPT | Mod: CPTII,,, | Performed by: HOSPITALIST

## 2023-04-10 PROCEDURE — 1159F MED LIST DOCD IN RCRD: CPT | Mod: CPTII,,, | Performed by: HOSPITALIST

## 2023-04-10 PROCEDURE — 3008F BODY MASS INDEX DOCD: CPT | Mod: CPTII,,, | Performed by: HOSPITALIST

## 2023-04-10 PROCEDURE — 99215 PR OFFICE/OUTPT VISIT, EST, LEVL V, 40-54 MIN: ICD-10-PCS | Mod: S$PBB,,, | Performed by: HOSPITALIST

## 2023-04-10 PROCEDURE — 87077 CULTURE AEROBIC IDENTIFY: CPT | Performed by: STUDENT IN AN ORGANIZED HEALTH CARE EDUCATION/TRAINING PROGRAM

## 2023-04-10 PROCEDURE — 99999 PR PBB SHADOW E&M-EST. PATIENT-LVL III: ICD-10-PCS | Mod: PBBFAC,,, | Performed by: HOSPITALIST

## 2023-04-10 RX ORDER — ALBUTEROL SULFATE 90 UG/1
2 AEROSOL, METERED RESPIRATORY (INHALATION) EVERY 6 HOURS PRN
Qty: 18 G | Refills: 0 | Status: ON HOLD | OUTPATIENT
Start: 2023-04-10 | End: 2023-06-03 | Stop reason: HOSPADM

## 2023-04-10 RX ORDER — ACETAMINOPHEN 500 MG
500 TABLET ORAL DAILY PRN
Status: ON HOLD | COMMUNITY
End: 2023-04-25 | Stop reason: HOSPADM

## 2023-04-10 NOTE — ASSESSMENT & PLAN NOTE
She is currently not needing blood pressure medication  Home BP- 108/71    Recent BP readings in the record-  Vitals - 1 value per visit 3/28/2023 3/28/2023 3/28/2023 3/28/2023   SYSTOLIC 107 111 103 100   DIASTOLIC 53 56 57 55     Vitals - 1 value per visit 3/29/2023 3/29/2023 4/4/2023 4/4/2023 4/10/2023   SYSTOLIC  114  98 108   DIASTOLIC  66  54 71     Hypertension-  Blood pressure is acceptable . I suggest  blood pressure  monitoring .I suggest addressing pain control as uncontrolled pain can increased blood pressure

## 2023-04-10 NOTE — OUTPATIENT SUBJECTIVE & OBJECTIVE
"Outpatient Subjective & Objective     Chief complaint-Preoperative evaluation, Perioperative Medical management, complication reduction plan     Active cardiac conditions- none    Revised cardiac risk index predictors- none  As per her she had a stroke at age 7 Years - Unknown cause - was under a lot of stress at that time . I am unsure if she had stroke    Functional capacity -Examples of physical activity , tries to exercise, walks, around the house ,  can take a flight of stairs holding on to the railing----- She can undertake all the above activities without  chest pain,chest tightness, Shortness of breath ,dizziness,lightheadedness making her exercise tolerance more,   than 4 Mets.       Review of Systems   Constitutional:         No unusual weight changes  Eating good- 3 meals a day   HENT:          STOPBANG score  1/ 8      Age over 50        Eyes:         No new visual changes   Respiratory:            Dry cough   No Hemoptysis   Cardiovascular:         As noted   Gastrointestinal:              Endocrine:        Prednisone use > 20 mg daily for 3 weeks- None   Genitourinary:         No urinary hesitancy    Musculoskeletal:         As above      Skin:  Negative for rash.   Neurological:  Negative for syncope.   Hematological:         Current use of Anticoagulants  None   Psychiatric/Behavioral:            No SI/HI   Had not seizure in a long time - - Sounds like Medication related   No vascular stenting             No anesthesia, bleeding, cardiac problems , PONV with previous surgeries/procedures.  Medications and Allergies reviewed in epic.     FH- No anesthesia,bleeding / venous thrombosis ,  heart disease in family      Physical Exam  Blood pressure 108/71, pulse 78, temperature 97.8 °F (36.6 °C), temperature source Oral, resp. rate 16, height 5' 9" (1.753 m), weight 85.3 kg (188 lb 1.6 oz), , SpO2 98 %.      Investigations  Lab and Imaging have been reviewed in epic.    Review of Medicine tests    EKG- I " had independently reviewed the EKG from--4/4/2023  It was reported to be showing     Normal sinus rhythm   Normal ECG   When compared with ECG of 04-JAN-2023 18:25,   Questionable change in The axis     Oct 2022    . Scintigraphically negative for ischemia or infarct.  2. The global left ventricular systolic function is normal with an LV ejection fraction of greater than 75 % and no evidence of LV dilatation. Wall motion is normal.    2022  The left ventricle is normal in size with mild concentric hypertrophy and normal systolic function.  The estimated ejection fraction is 70%.  Normal left ventricular diastolic function.  Normal right ventricular size with normal right ventricular systolic function.  Mild mitral regurgitation.  Normal central venous pressure (3 mmHg).  The estimated PA systolic pressure is 16 mmHg.       I offered a gown and the presence of a chaperone during physical examination   She was comfortable to proceed with the exam without the the presence of a chaperone        Physical Exam  Constitutional- Vitals - Body mass index is 27.78 kg/m².,   Vitals:    04/10/23 1153   BP: 108/71   Pulse: 78   Resp: 16   Temp: 97.8 °F (36.6 °C)     General appearance-Conscious,Coherent  Eyes- No conjunctival icterus,pupils  round  and reactive to light   ENT-Oral cavity- moist    , Hearing grossly normal   Neck- No thyromegaly ,Trachea -central, No jugular venous distension,   No Carotid Bruit   Cardiovascular -Heart Sounds- Normal  and  no murmur   , No gallop rhythm   Respiratory - Normal Respiratory Effort, Normal breath sounds,  no wheeze , and  no forced expiratory wheeze    Peripheral pitting pedal edema-- none , no calf pain   Gastrointestinal -Soft abdomen, No palpable masses, Non Tender,Liver,Spleen not palpable. No-- free fluid and shifting dullness  Musculoskeletal- No finger Clubbing. Strength grossly normal   Lymphatic-No Palpable cervical, axillary,Inguinal lymphadenopathy   Psychiatric - normal  effect,Orientation  Rt Dorsalis pedis pulses-palpable    Lt Dorsalis pedis pulses- palpable   Rt Posterior tibial pulses -palpable   Left posterior tibial pulses -palpable   Miscellaneous -  Surgical scarmid abdomen   and  no renal bruit   Review of clinical lab tests:  Lab Results   Component Value Date    CREATININE 1.1 03/22/2023    HGB 10.4 (L) 03/22/2023     03/22/2023         Review of old records- Was done and information gathered regards to events leading to surgery and health conditions of significance in the perioperative period       2018     No evidence of diffusion restriction suggest acute infarction.     No acute intracranial process on this limited examination.     Unremarkable MRA of the intracranial circulation      Review of old records- Was done and information gathered regards to events leading to surgery and health conditions of significance in the perioperative period.    Outpatient Subjective & Objective

## 2023-04-10 NOTE — ASSESSMENT & PLAN NOTE
History of DVT- in both lower extremities     US - 9/3/2021- No evidence of deep venous thrombosis in either lower extremity.  I was unable to pull the records from Count includes the [facility] system that had thrombosis    History of DVT  No PE  Episode- 1  In the setting of her rehab stay , in the setting of post op   Associated with reduced mobility,no long journeys,no cancer, prior surgery, Hospital stay,no HRT  Anticoagulated for a few months  IVC filter - None    Her history of thrombosis seems to be provoked in the setting of postoperative time from reduced mobility    She had no recurrence of blood clot and has not had any blood clots since 2021    Increased risk of thrombosis in the socorro operative period , compression stocking use discussed

## 2023-04-10 NOTE — PROGRESS NOTES
Urology ([facility]) H&P for upcoming procedure  Staff:  Chirag Russ MD    CC: Right nephrolithiasis    HPI: Edita Riley is a 58 y.o. female who is established to our clinic. She recently presented to the ED 3/23/23 for generalized abdominal pain, poorly draining right nephrostomy tube, and n/v. Urology was consulted for new right-sided hydronephrosis.    She has a history of distal ureteral strictures due to XRT for cervical cancer.  She underwent a transverse colon conduit on 9/11/2020.  The patient had bilateral hydronephrosis with reflux on the right side and a sluggish ureter on the left side. Bilateral PCNTs since Apr 2021.  She underwent a percutaneous antegrade approach on 10/27/2022 for ureteral stones.     She had a CT scan done on one of her ER visits on 12/2/2022 which showed a 2 mm stone in the right ureter and a 6 mm stone in the lower pole.  The left NT was accidentally removed on 2/13/2023 and it was replaced on 2/14/2023.  The right was replaced at the same time, per IR's note. Return visit to the ED 3/1/23 for dislodged L NT which was replaced on 3/2/23.  CT shows bilateral nephrostomy tubes in correct anatomic position, new onset right sided hydronephrosis, no hydro on the left, 4mm stone in the mid right ureter with proximal hydroureter, small lower pole renal stones in the right kidney.     Patient underwent bilateral PCNT exchange 3/28/23 with IR.    She presents today for pre-op visit prior to R PCNL (new access with IR arranged at the same time), R antegrade URS, stone basket extraction.  Recent CT shows Right NT appearing to bypass the renal parenchyma and directly entering the renal pelvis.  Discussed obtaining new access with IR.      She has an appt with Dr. rDiver today for pre-op clearance.  Saw cardiology on 4/4 and cleared from a cardiac perspective.          ROS: negative for the remaining 12 points of ROS except for as stated above    Past Medical History:    Diagnosis Date    Abnormal mammogram 08/25/2020    Acute blood loss anemia     Acute deep vein thrombosis (DVT) of lower extremity 12/09/2020    Advance care planning 04/30/2021    Anemia due to chronic blood loss     Anemia due to chronic kidney disease     Anxiety     Bilateral ureteral obstruction 9/11/2020    Cardiovascular event risk -- low 09/14/2015    ASCVD 10-year risk 1.9% (with optimal risk factors 1.3%) as of 9/14/2015     Cervical cancer 2014    Chronic back pain     Colostomy care     Deep vein thrombosis     Depression     Diarrhea due to malabsorption 11/14/2018    Diarrhea due to malabsorption 11/14/2018    Difficult intubation     Disorder of kidney and ureter     DVT of lower extremity, bilateral 11/04/2020    Fibromyalgia     Fungemia 09/27/2020    Generalized abdominal pain 08/25/2020    GERD (gastroesophageal reflux disease)     Hemifacial spasm 09/16/2015    Hiatal hernia 2014    History of cervical cancer 10/11/2018    Hx of psychiatric care     Cymbalta, trazodone    Hypertension     Hypomagnesemia 11/21/2018    Lactose intolerance     Metastatic squamous cell carcinoma to lymph node 10/11/2018    Nephrostomy tube displaced     Neuropathy due to chemotherapeutic drug 11/14/2018    Osteoarthritis of back     Peritonitis 09/22/2020    Pseudomonas urinary tract infection 04/21/2021    Psychiatric problem     Refusal of blood transfusions as patient is [employer]     Schatzki's ring 09/14/2015    Seen on outside EGD 05/2014, underwent esophageal dilatation. Bx were negative.     Seizures     Sleep stage dysfunction     Noted on PSG 06/2017; negative for obstructive sleep apnea     Stroke     Urinary tract infection associated with nephrostomy catheter 06/11/2020    Wound infection after surgery 09/24/2020       Past Surgical History:   Procedure Laterality Date    ANTEGRADE NEPHROSTOGRAPHY Bilateral 9/28/2020    Procedure: Nephrostogram - antegrade;  Surgeon: Leslie Balbuena MD;   Location: [facility] OR 1ST FLR;  Service: Urology;  Laterality: Bilateral;    ANTEGRADE NEPHROSTOGRAPHY Left 4/20/2021    Procedure: NEPHROSTOGRAM, ANTEGRADE;  Surgeon: Leslie Balbuena MD;  Location: [facility] OR 1ST FLR;  Service: Urology;  Laterality: Left;    ANTEGRADE NEPHROSTOGRAPHY Right 10/27/2022    Procedure: NEPHROSTOGRAM, ANTEGRADE;  Surgeon: Chirag Russ MD;  [facility];  Service: Urology;  Laterality: Right;    BILATERAL OOPHORECTOMY  2015    CHOLECYSTECTOMY  11/09/2016    Done at [facility], path showed chronic cholecystitis and gallstones    cold knife conization  2014    COLECTOMY, RIGHT  9/17/2020    Procedure: COLECTOMY, RIGHT Extended;  Surgeon: Hammad Reynolds MD;  Location: [facility] OR 2ND FLR;  Service: Colon and Rectal;;    COLONOSCOPY  2014    COLONOSCOPY N/A 10/24/2016    at [facility], Dr Gutiérrez    COLONOSCOPY N/A 5/18/2018    Procedure: COLONOSCOPY;  Surgeon: Arden Gutiérrez MD;  Location: [facility] (4TH FLR);  Service: Endoscopy;  Laterality: N/A;    COLONOSCOPY N/A 7/28/2020    Procedure: COLONOSCOPY;  Surgeon: Hammad Reynolds MD;  Location: [facility] (4TH FLR);  Service: Colon and Rectal;  Laterality: N/A;  covid test [place] 7/25    CYSTOSCOPY WITH URETEROSCOPY, RETROGRADE PYELOGRAPHY, AND INSERTION OF STENT Bilateral 3/21/2020    Procedure: CYSTOSCOPY, WITH RETROGRADE PYELOGRAM,;  Surgeon: Leslie Balbuena MD;  Location: [facility] OR 1ST FLR;  Service: Urology;  Laterality: Bilateral;    DILATION OF NEPHROSTOMY TRACT Right 10/27/2022    Procedure: DILATION, NEPHROSTOMY TRACT;  Surgeon: Chirag Russ MD;  [facility];  Service: Urology;  Laterality: Right;    ESOPHAGOGASTRODUODENOSCOPY  2014    ESOPHAGOGASTRODUODENOSCOPY  11/18/2020    ESOPHAGOGASTRODUODENOSCOPY N/A 11/18/2020    Procedure: ESOPHAGOGASTRODUODENOSCOPY (EGD);  Surgeon: Zenon Spencer MD;  Location: [facility];  Service: Endoscopy;  Laterality: N/A;    ESOPHAGOGASTRODUODENOSCOPY N/A 12/11/2020     Procedure: EGD (ESOPHAGOGASTRODUODENOSCOPY);  Surgeon: Juancho Mues MD;  Location: [facility] ENDO (2ND FLR);  Service: Endoscopy;  Laterality: N/A;    HYSTERECTOMY  2014    [facility] for cervical cancer    ILEOSTOMY  9/17/2020    Procedure: CREATION, ILEOSTOMY;  Surgeon: Hammad Reynolds MD;  Location: [facility] OR 2ND FLR;  Service: Colon and Rectal;;    LOOPOGRAM N/A 4/20/2021    Procedure: LOOPOGRAM;  Surgeon: Leslie Balbuena MD;  Location: [facility] OR 1ST FLR;  Service: Urology;  Laterality: N/A;    MOBILIZATION OF SPLENIC FLEXURE N/A 9/11/2020    Procedure: MOBILIZATION, COLONIC;  Surgeon: Hammad Reynolds MD;  Location: [facility] OR 2ND FLR;  Service: Colon and Rectal;  Laterality: N/A;    NEPHROSTOGRAPHY Bilateral 4/17/2021    Procedure: Nephrostogram;  Surgeon: Celeste Surgeon;  Location: [facility];  Service: Anesthesiology;  Laterality: Bilateral;    OOPHORECTOMY      PYELOSCOPY Right 10/27/2022    Procedure: PYELOSCOPY;  Surgeon: Chirag Russ MD;  [facility];  Service: Urology;  Laterality: Right;    REPLACEMENT OF NEPHROSTOMY TUBE Right 10/27/2022    Procedure: REPLACEMENT, NEPHROSTOMY TUBE;  Surgeon: Chirag Russ MD;  [facility];  Service: Urology;  Laterality: Right;    RETROPERITONEAL LYMPHADENECTOMY Right 9/17/2018    Procedure: LYMPHADENECTOMY, RETROPERITONEUM;  Surgeon: Sebas Reed MD;  [facility];  Service: General;  Laterality: Right;  ILIAC    URETEROSCOPIC REMOVAL OF URETERIC CALCULUS Right 10/27/2022    Procedure: REMOVAL, CALCULUS, URETER, URETEROSCOPIC;  Surgeon: Chirag Russ MD;  Location: [facility] OR [facility] FLR;  Service: Urology;  Laterality: Right;    URETEROSCOPY Right 10/27/2022    Procedure: URETEROSCOPY-ANTEGRADE;  Surgeon: Chirag Russ MD;  [facility];  Service: Urology;  Laterality: Right;       Social History     Tobacco Use    Smoking status: Never    Smokeless tobacco: Never   Substance Use Topics    Alcohol use: No      Alcohol/week: 0.0 standard drinks    Drug use: No       Family History   Problem Relation Age of Onset    Heart disease Sister     Heart disease Maternal Grandmother     Colon cancer Father     Esophageal cancer Father     Cancer Father         Lung-smoker    Cancer Mother         Cervical    Cervical cancer Mother     Breast cancer Maternal Aunt     Suicide Daughter         jumped from parking structure    Drug abuse Daughter     Drug abuse Daughter     Rectal cancer Neg Hx     Stomach cancer Neg Hx     Crohn's disease Neg Hx     Ulcerative colitis Neg Hx     Diabetes Neg Hx     Hypertension Neg Hx        Review of patient's allergies indicates:   Allergen Reactions    Bee sting [allergen ext-venom-honey bee]      Rash      Grass pollen-bermuda, standard      rash       Current Outpatient Medications on File Prior to Visit   Medication Sig Dispense Refill    gabapentin (NEURONTIN) 100 MG capsule Take 2 capsules (200 mg total) by mouth every evening. 90 capsule 3    ketorolac (TORADOL) 10 mg tablet Take 1 tablet (10 mg total) by mouth every 6 (six) hours as needed for Pain. 30 tablet 0    melatonin (MELATIN) 3 mg tablet Take 2 tablets (6 mg total) by mouth nightly as needed for Insomnia. 60 tablet 0    mirtazapine (REMERON SOL-TAB) 15 MG disintegrating tablet Dissolve 1 tablet (15 mg total) by mouth nightly. 30 tablet 11    sodium bicarbonate 650 MG tablet Take 2 tablets (1,300 mg total) by mouth 2 (two) times daily. 120 tablet 11    sucralfate (CARAFATE) 1 gram tablet Take 1 tablet (1 g total) by mouth 4 (four) times daily before meals and nightly. 120 tablet 2    traMADoL (ULTRAM) 50 mg tablet Take 1 tablet (50 mg total) by mouth every 6 (six) hours. 5 tablet 0     No current facility-administered medications on file prior to visit.       Anticoagulation:  No    Physical Exam:  weight 187 lb/85.1 kg  BMI 27.7    WDWN in NAD  CTAB  RRR  Nephrostomy tubes in place. R draining cloudy urine with sediment, L draining  cloudy yellow urine  Midline surgical scar well healed, urostomy LLQ pink and patent draining clear yellow urine, ileostomy RLQ with semi-formed stool and gas output    Labs:  Urine sent for culture today from each nephrostomy tube    Lab Results   Component Value Date/Time    LABURIN  03/22/2023 09:27 PM     Multiple organisms isolated. None in predominance.  Repeat if    LABURIN clinically necessary. 03/22/2023 09:27 PM    LABURIN KLEBSIELLA PNEUMONIAE ESBL  >100,000 cfu/ml   (A) 02/14/2023 05:40 PM    LABURIN ENTEROCOCCUS FAECIUM VRE  >100,000 cfu/ml   (A) 10/23/2022 01:21 PM    LABURIN  10/03/2022 11:21 AM     Multiple organisms isolated. None in predominance.  Repeat if    LABURIN clinically necessary. 10/03/2022 11:21 AM         BMP  Lab Results   Component Value Date     03/22/2023    K 4.2 03/22/2023     (H) 03/22/2023    CO2 23 03/22/2023    BUN 11 03/22/2023    CREATININE 1.1 03/22/2023    CALCIUM 8.5 (L) 03/22/2023    ANIONGAP 10 03/22/2023    EGFRNORACEVR 57.5 (A) 03/22/2023     Lab Results   Component Value Date    WBC 10.33 03/22/2023    HGB 10.4 (L) 03/22/2023    HCT 36 03/22/2023    MCV 92 03/22/2023     03/22/2023         Imaging:  CT scan (3/22/23): Right nephrostomy tube appears to be bypassing the renal parenchyma and directly entering the renal pelvis.  Moderate-severe R hydro.  No L hydro.  4 mm R mid ureteral stone.  Additional 4 mm R lower pole stone.    KUB (3/1/23): No renal stones visualized on KUB.      Assessment: Edita Riley is a 58 y.o. female with right nephrolithiasis    Plan:   1. Will plan for hospital admission on 4/17/23 for 5 days of IV abx prior to procedure.  2. Send urine for culture today (from bilateral neph tubes).   3. To OR on 4/21/2023 for R PCNL (new access with IR present), antegrade R URS and stone basket extraction  4. Consents signed  5. I have explained the risk, benefits, and alternatives of the procedure in detail. The patient  voices understanding and all questions have been answered. The patient agrees to proceed as planned.     Mana Wilks MD

## 2023-04-10 NOTE — HPI
History of present illness- I had the pleasure of meeting this pleasant 58 y.o. lady in the pre op clinic prior to her elective Urological surgery. The patient is new to me .   Offered to have her  on the phone during the consultation     I have obtained the history by speaking to the patient and by reviewing the electronic health records.    Events leading up to surgery / History of presenting illness -    Right ureteral stone   Right renal stone       She has been troubled with severe  Rt loin pain pain for 6 months  .   Pain increases with activity and decreases with resting.    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    She has urine through the nephrostomy tubes as well as the colon conduit     Relevant health conditions of significance for the perioperative period/ History of presenting illness -    Health conditions of significance for the perioperative period      -CKD3  -History of DVT  -History of cervical cancer 2015, 2021  -Ileostomy, Urostomy  - HTN  - Depression   - Emphysema    She lives with her  and her  daughter in a single-level house  She has help available after surgery- and the daughter    Not known to have heart disease , Diabetes Mellitus,

## 2023-04-10 NOTE — ASSESSMENT & PLAN NOTE
History of distal ureteral strcitures due to XRT for cervical cancer.    She underwent a transverse colon conduit on 9/11/2020.   .

## 2023-04-10 NOTE — ASSESSMENT & PLAN NOTE
I suggested talking to the perioperative care team about this although she may not require transfusion

## 2023-04-10 NOTE — DISCHARGE INSTRUCTIONS
Your surgery has been scheduled for:________4/21/2023__________________________________    You should report to:  ____[facility], located on the [place] side of the first floor of the           [facility] ([phone])  __X__[facility], located on the [facility] side of the            Second floor of the [facility] ([phone])  ____3rd Floor [facility] located on the [facility] side of the [facility] [phone]  ____[facility]: located at [address] [facility] [place] [address]. [address]     Please Note   Tell your doctor if you take Aspirin, products containing Aspirin, herbal medications  or blood thinners, such as Coumadin, Ticlid, or Plavix.  (Consult your provider regarding holding or stopping before surgery).  Arrange for someone to drive you home following surgery.  You will not be allowed to leave the surgical facility alone or drive yourself home following sedation and anesthesia.    Before Surgery  Stop taking all herbal medications, vitamins, and supplements 7 days prior to surgery  No Motrin/Advil (Ibuprofen) 7 days before surgery  No Aleve (Naproxen) 7 days before surgery  Stop Taking Asprin, products containing Asprin __7___days before surgery  Stop taking blood thinners_NA______days before surgery  No Goody's/BC  Powder 7 days before surgery  Refrain from drinking alcoholic beverages for 24hours before and after surgery  Stop or limit smoking _____7____days before surgery  You may take Tylenol for pain    Night before Surgery  Do not eat or drink after midnight  Take a shower or bath (shower is recommended).  Bathe with Hibiclens soap or an antibacterial soap from the neck down.  If not supplied by your surgeon, hibiclens soap will need to be purchased over the counter in pharmacy.  Rinse soap off thoroughly.  Shampoo your hair with your regular shampoo    The Day of  Surgery  Take another bath or shower with hibiclens or any antibacterial soap, to reduce the chance of infection.  Take heart and blood pressure medications with a small sip of water, as advised by the perioperative team.  Do not take fluid pills  You may brush your teeth and rinse your mouth, but do not swall any additional water.   Do not apply perfumes, powder, body lotions or deodorant on the day of surgery.  Nail polish should be removed.  Do not wear makeup or moisturizer  Wear comfortable clothes, such as a button front shirt and loose fitting pants.  Leave all jewelry, including body piercings, and valuables at home.    Bring any devices you will neeed after surgery such as crutches or canes.  If you have sleep apnea, please bring your CPAP machine  In the event that your physical condition changes including the onset of a cold or respiratory illness, or if you have to delay or cancel your surgery, please notify your surgeon.     Anesthesia: General Anesthesia     You are watched continuously during your procedure by your anesthesia provider.     Youre due to have surgery. During surgery, youll be given medicine called anesthesia or anesthetic. This will keep you comfortable and pain-free. Your anesthesia provider will use general anesthesia.  What is general anesthesia?  General anesthesia puts you into a state like deep sleep. It goes into the bloodstream (IV anesthetics), into the lungs (gas anesthetics), or both. You feel nothing during the procedure. You will not remember it. During the procedure, the anesthesia provider monitors you continuously. He or she checks your heart rate and rhythm, blood pressure, breathing, and blood oxygen.  IV anesthetics. IV anesthetics are given through an IV line in your arm. Theyre often given first. This is so you are asleep before a gas anesthetic is started. Some kinds of IV anesthetics relieve pain. Others relax you. Your doctor will decide which kind is best in  your case.  Gas anesthetics. Gas anesthetics are breathed into the lungs. They are often used to keep you asleep. They can be given through a facemask or a tube placed in your larynx or trachea (breathing tube).  If you have a facemask, your anesthesia provider will most likely place it over your nose and mouth while youre still awake. Youll breathe oxygen through the mask as your IV anesthetic is started. Gas anesthetic may be added through the mask.  If you have a tube in the larynx or trachea, it will be inserted into your throat after youre asleep.  Anesthesia tools and medicines  You will likely have:  IV anesthetics. These are put into an IV line into your bloodstream.  Gas anesthetics. You breathe these anesthetics into your lungs, where they pass into your bloodstream.  Pulse oximeter. This is a small clip that is attached to the end of your finger. This measures your blood oxygen level.  Electrocardiography leads (electrodes). These are small sticky pads that are placed on your chest. They record your heart rate and rhythm.  Blood pressure cuff. This reads your blood pressure.  Risks and possible complications  General anesthesia has some risks. These include:  Breathing problems  Nausea and vomiting  Sore throat or hoarseness (usually temporary)  Allergic reaction to the anesthetic  Irregular heartbeat (rare)  Cardiac arrest (rare)   Anesthesia safety  Follow all instructions you are given for how long not to eat or drink before your procedure.  Be sure your doctor knows what medicines and drugs you take. This includes over-the-counter medicines, herbs, supplements, alcohol or other drugs. You will be asked when those were last taken.  Have an adult family member or friend drive you home after the procedure.  For the first 24 hours after your surgery:  Do not drive or use heavy equipment.  Do not make important decisions or sign legal documents. If important decisions or signing legal documents is  necessary during the first 24 hours after surgery, have a trusted family member or spouse act on your behalf.  Avoid alcohol.  Have a responsible adult stay with you. He or she can watch for problems and help keep you safe.  Date Last Reviewed: 12/1/2016 © 2000-2017 [employer]. [address] [place] [address], [place] [address]. All rights reserved. This information is not intended as a substitute for professional medical care. Always follow your healthcare professional's instructions.

## 2023-04-10 NOTE — ASSESSMENT & PLAN NOTE
Both knees  Long standing   Takes Tramadol , 1-2 a day   Chronic continuous opioid use- In view of the opioid use, the patient may have opioid tolerance . I suggest considering the possibility of opioid tolerance  in planning post operative pain control     Discussed possibly reducing the opioid use in preparation for surgery , so that it reduces the opioid tolerance which helps post op pain control

## 2023-04-10 NOTE — TELEPHONE ENCOUNTER
I LVM now on pt [facility] mobile phone that she had an appt tomorrow per below with [facility] per request now of Maryann Darnell MD.  Thank you.    EMMA SWARTZ MRN: [id number]    Date: 4/11/2023 Status: Tanika   Appt Time: 9:30 AM Length: 30   Visit Type: NEW PATIENT - ID ([facility]) [[id number]] Copay: $0.00   Provider: Shruti Jones NP

## 2023-04-10 NOTE — ASSESSMENT & PLAN NOTE
Creatinine is stable at about 1    Stages of CKD discussed  Deleterious effects NSAID's , Beneficial effects of Hydration discussed   Tylenol as needed for pain     I  suggest monitoring renal function, in put and out put status socorro-operatively. I  suggest avoiding nephrotoxic medication including NSAIDs, COX2 inhibitors, intravenous contrast agent,avoiding hypotension to prevent further renal impairment.     I suggested care with using ketorolac given that it is an anti-inflammatory medication  She does not seem to be taking that very frequently  Suggested 1 week preop hold

## 2023-04-10 NOTE — ASSESSMENT & PLAN NOTE
-History of cervical cancer 2015   Had recurrence in 2021  Had radiation, chemo in 2021  Currently doing good

## 2023-04-10 NOTE — ASSESSMENT & PLAN NOTE
CT Dec 2020 showed  Mild diffuse emphysematous changes   Never smoked tobacco     She is not trouble with breathing but occasionally gets short of breath  She has Group A COPD  She  does not seem to be troubled with shortness of breath    He has dry cough  No wheezing  No inhaler or oxygen use    Pulmonary optimization measures    I suggest the following to help with the pulmonary status in the perioperative period     Preoperative period     Ordered albuterol inhaler for p.r.n. use  - Inhaler technique reinforcement    - Increased Physical activity   - Mucolytic / Expectorant treatment  - Mucinex  - Weight  loss that helps the pulmonary status-       Intra / post operative period     - Minimize use of sedating Medication- Opioid/ Benzodiazepine   - Consider use of regional block , to reduce the need for opioid treatment  - Consider opioid sparing anesthesia/Analgesia   - Consult Respiratory therapy , Physical therapy   - Cough, Deep breathing exercises   - Early ambulation   - Out of bed to chair   - Incentive spirometer use   -  adequate pain control ,so as to avoid diaphragmatic splinting  )   -  Oral care , head end of bed elevation, Nutrition   - Oxygen saturation, End tidal CO2 monitoring    - Having patient in a monitored care area so that hemodynamics , respiratory status can be monitored

## 2023-04-10 NOTE — ASSESSMENT & PLAN NOTE
Salted food related       Edema- I suggested avoidance of added salt,avoidance of NSAID's, unless advised or ordered  and suggested Limb elevation and tamiko hose use

## 2023-04-11 ENCOUNTER — OFFICE VISIT (OUTPATIENT)
Dept: INFECTIOUS DISEASES | Facility: CLINIC | Age: 58
End: 2023-04-11
Payer: MEDICAID

## 2023-04-11 VITALS
SYSTOLIC BLOOD PRESSURE: 97 MMHG | DIASTOLIC BLOOD PRESSURE: 62 MMHG | TEMPERATURE: 98 F | HEART RATE: 69 BPM | HEIGHT: 69 IN | WEIGHT: 188.5 LBS | BODY MASS INDEX: 27.92 KG/M2

## 2023-04-11 DIAGNOSIS — N13.5 BILATERAL URETERAL OBSTRUCTION: ICD-10-CM

## 2023-04-11 PROCEDURE — 99215 PR OFFICE/OUTPT VISIT, EST, LEVL V, 40-54 MIN: ICD-10-PCS | Mod: S$PBB,,, | Performed by: REGISTERED NURSE

## 2023-04-11 PROCEDURE — 3078F DIAST BP <80 MM HG: CPT | Mod: CPTII,,, | Performed by: REGISTERED NURSE

## 2023-04-11 PROCEDURE — 3074F SYST BP LT 130 MM HG: CPT | Mod: CPTII,,, | Performed by: REGISTERED NURSE

## 2023-04-11 PROCEDURE — 3008F PR BODY MASS INDEX (BMI) DOCUMENTED: ICD-10-PCS | Mod: CPTII,,, | Performed by: REGISTERED NURSE

## 2023-04-11 PROCEDURE — 3008F BODY MASS INDEX DOCD: CPT | Mod: CPTII,,, | Performed by: REGISTERED NURSE

## 2023-04-11 PROCEDURE — 99215 OFFICE O/P EST HI 40 MIN: CPT | Mod: S$PBB,,, | Performed by: REGISTERED NURSE

## 2023-04-11 PROCEDURE — 99999 PR PBB SHADOW E&M-EST. PATIENT-LVL III: CPT | Mod: PBBFAC,,, | Performed by: REGISTERED NURSE

## 2023-04-11 PROCEDURE — 3074F PR MOST RECENT SYSTOLIC BLOOD PRESSURE < 130 MM HG: ICD-10-PCS | Mod: CPTII,,, | Performed by: REGISTERED NURSE

## 2023-04-11 PROCEDURE — 99999 PR PBB SHADOW E&M-EST. PATIENT-LVL III: ICD-10-PCS | Mod: PBBFAC,,, | Performed by: REGISTERED NURSE

## 2023-04-11 PROCEDURE — 3078F PR MOST RECENT DIASTOLIC BLOOD PRESSURE < 80 MM HG: ICD-10-PCS | Mod: CPTII,,, | Performed by: REGISTERED NURSE

## 2023-04-11 PROCEDURE — 99213 OFFICE O/P EST LOW 20 MIN: CPT | Mod: PBBFAC | Performed by: REGISTERED NURSE

## 2023-04-11 NOTE — PROGRESS NOTES
Subjective     Patient ID: Edita Riley is a 58 y.o. female.    Chief Complaint: Follow-up    HPI    Ms Riley is a 57 yo female with PMH of HRN, DVT, CKD, cervical cancer s/p pelvic radiation c/b bilateral ureteral strictures and bilateral hydronephrosis requiring urinary diversion 9/2020, osteoarthritis, QUOC, PTSD, depression, QTc prolonation, who presents to clinic requesting preop antibiotics. Pt has bilateral distal ureteral strictures 2/2 to XRT (cervical cancer) and is s/p transverse colon conduit. Pt produces urine via nephrostomy tubes as well as the LLQ colon conduit. Pt is scheduled for R PCNL, R antegrade URS, stone basket extraction on 4/21/23. Urine cultures were obtained from bilateral nephrostomy tubes on 4/10/23 and are pending. Pt has history of MDRO UTIs, including a kleb pneumoniae ESBL in feburary 2023. Most recent EKG was in 01/2023 and pt qtc was 435. Pt has a documented episode of QT prolongation, EKG in 4/16/2021 noted QTc of 605. Pt was asymptomatic.     Today, pt denies fever, body aches, chills. Denies NVD. Denies suprapubic pain but reports flank pain. Denies foul smelling urine, denies hematuria, denies decreased urine. Reports chronic back pain.             Review of Systems   Constitutional:  Negative for chills, diaphoresis, fatigue and fever.   HENT:  Negative for nasal congestion, sinus pressure/congestion, sneezing and sore throat.    Respiratory:  Positive for cough (this AM). Negative for chest tightness and shortness of breath.    Cardiovascular:  Negative for chest pain and leg swelling.   Gastrointestinal:  Negative for constipation, diarrhea, nausea and vomiting.   Genitourinary:  Positive for difficulty urinating. Negative for hematuria.        Pt has bilateral nephrostomy tubes.     Denies suprapubic pain.    Musculoskeletal:  Positive for back pain (chronic).   Integumentary:  Negative for rash and wound.   Neurological:  Positive for weakness (right  sided weakness). Negative for dizziness.   Psychiatric/Behavioral:  Negative for agitation and confusion.         Objective     Physical Exam  Vitals reviewed.   Constitutional:       General: She is not in acute distress.     Appearance: Normal appearance. She is not ill-appearing.   HENT:      Head: Normocephalic.      Nose: Nose normal.      Mouth/Throat:      Mouth: Mucous membranes are moist.      Pharynx: Oropharynx is clear. No oropharyngeal exudate.   Eyes:      General: No scleral icterus.     Conjunctiva/sclera: Conjunctivae normal.   Cardiovascular:      Rate and Rhythm: Normal rate and regular rhythm.      Heart sounds: Normal heart sounds. No murmur heard.  Pulmonary:      Effort: No respiratory distress.      Breath sounds: Normal breath sounds.   Abdominal:      General: Bowel sounds are normal. There is no distension.      Palpations: Abdomen is soft.      Tenderness: There is no right CVA tenderness or left CVA tenderness.   Musculoskeletal:         General: Normal range of motion.      Cervical back: Normal range of motion.      Right lower leg: No edema.      Left lower leg: No edema.   Skin:     General: Skin is warm.      Comments: Two ostomies noted in RLQ and LLQ. LLQ ostomy with urine output. RLQ with stool output.     Bilateral nephrostomy tubes with clear urine output.    Neurological:      General: No focal deficit present.      Mental Status: She is alert and oriented to person, place, and time.      Motor: No weakness.      Gait: Gait normal.   Psychiatric:         Mood and Affect: Mood normal.         Behavior: Behavior normal.         Thought Content: Thought content normal.          Assessment and Plan     Problem List Items Addressed This Visit          Renal/    Bilateral ureteral obstruction     - Will follow urine cultures  - Discussed case with Dr. Wilsk with urology. States original plan was admission 5d prior to surgery for hospital admission but would like to avoid if at  all possible. D/t hx of prolonged QTc, will likely not have oral options but will need to follow culture data.          60 minutes of total time was spent on this encounter, which includes face to face time and non-face to face time preparing to see the patient (eg, review of tests), Obtaining and/or reviewing separately obtained history, documenting clinical information in the electronic or other health record, independently interpreting results (not separately reported) and communicating results to the patient/family/caregiver, or care coordination (not separately reported).

## 2023-04-14 LAB
BACTERIA UR CULT: ABNORMAL

## 2023-04-16 ENCOUNTER — HOSPITAL ENCOUNTER (INPATIENT)
Facility: HOSPITAL | Age: 58
LOS: 10 days | Discharge: HOME | DRG: 699 | End: 2023-04-26
Attending: EMERGENCY MEDICINE | Admitting: HOSPITALIST
Payer: MEDICAID

## 2023-04-16 DIAGNOSIS — R10.9 RIGHT FLANK PAIN: Primary | ICD-10-CM

## 2023-04-16 DIAGNOSIS — N39.0 COMPLICATED UTI (URINARY TRACT INFECTION): ICD-10-CM

## 2023-04-16 DIAGNOSIS — N13.30 HYDRONEPHROSIS, UNSPECIFIED HYDRONEPHROSIS TYPE: ICD-10-CM

## 2023-04-16 DIAGNOSIS — N13.5 OBSTRUCTION OF URETER, UNSPECIFIED LATERALITY: ICD-10-CM

## 2023-04-16 LAB
ALBUMIN SERPL BCP-MCNC: 3.2 G/DL (ref 3.5–5.2)
ALP SERPL-CCNC: 115 U/L (ref 55–135)
ALT SERPL W/O P-5'-P-CCNC: 8 U/L (ref 10–44)
ANION GAP SERPL CALC-SCNC: 12 MMOL/L (ref 8–16)
AST SERPL-CCNC: 15 U/L (ref 10–40)
BACTERIA #/AREA URNS AUTO: ABNORMAL /HPF
BASOPHILS # BLD AUTO: 0.05 K/UL (ref 0–0.2)
BASOPHILS NFR BLD: 0.4 % (ref 0–1.9)
BILIRUB SERPL-MCNC: 0.3 MG/DL (ref 0.1–1)
BILIRUB UR QL STRIP: NEGATIVE
BUN SERPL-MCNC: 16 MG/DL (ref 6–20)
CALCIUM SERPL-MCNC: 9.3 MG/DL (ref 8.7–10.5)
CHLORIDE SERPL-SCNC: 110 MMOL/L (ref 95–110)
CLARITY UR REFRACT.AUTO: ABNORMAL
CO2 SERPL-SCNC: 18 MMOL/L (ref 23–29)
COLOR UR AUTO: YELLOW
CREAT SERPL-MCNC: 1.1 MG/DL (ref 0.5–1.4)
DIFFERENTIAL METHOD: ABNORMAL
EOSINOPHIL # BLD AUTO: 0.2 K/UL (ref 0–0.5)
EOSINOPHIL NFR BLD: 1.6 % (ref 0–8)
ERYTHROCYTE [DISTWIDTH] IN BLOOD BY AUTOMATED COUNT: 15.5 % (ref 11.5–14.5)
EST. GFR  (NO RACE VARIABLE): 57.5 ML/MIN/1.73 M^2
GLUCOSE SERPL-MCNC: 83 MG/DL (ref 70–110)
GLUCOSE UR QL STRIP: NEGATIVE
HCT VFR BLD AUTO: 44.1 % (ref 37–48.5)
HGB BLD-MCNC: 13.1 G/DL (ref 12–16)
HGB UR QL STRIP: ABNORMAL
HYALINE CASTS UR QL AUTO: 0 /LPF
IMM GRANULOCYTES # BLD AUTO: 0.02 K/UL (ref 0–0.04)
IMM GRANULOCYTES NFR BLD AUTO: 0.2 % (ref 0–0.5)
KETONES UR QL STRIP: NEGATIVE
LACTATE SERPL-SCNC: 1.4 MMOL/L (ref 0.5–2.2)
LEUKOCYTE ESTERASE UR QL STRIP: ABNORMAL
LYMPHOCYTES # BLD AUTO: 1.5 K/UL (ref 1–4.8)
LYMPHOCYTES NFR BLD: 12.9 % (ref 18–48)
MCH RBC QN AUTO: 27.4 PG (ref 27–31)
MCHC RBC AUTO-ENTMCNC: 29.7 G/DL (ref 32–36)
MCV RBC AUTO: 92 FL (ref 82–98)
MICROSCOPIC COMMENT: ABNORMAL
MONOCYTES # BLD AUTO: 1.5 K/UL (ref 0.3–1)
MONOCYTES NFR BLD: 12.7 % (ref 4–15)
NEUTROPHILS # BLD AUTO: 8.3 K/UL (ref 1.8–7.7)
NEUTROPHILS NFR BLD: 72.2 % (ref 38–73)
NITRITE UR QL STRIP: NEGATIVE
NRBC BLD-RTO: 0 /100 WBC
PH UR STRIP: 6 [PH] (ref 5–8)
PLATELET # BLD AUTO: 267 K/UL (ref 150–450)
PMV BLD AUTO: 10.8 FL (ref 9.2–12.9)
POTASSIUM SERPL-SCNC: 4.4 MMOL/L (ref 3.5–5.1)
PROT SERPL-MCNC: 6.6 G/DL (ref 6–8.4)
PROT UR QL STRIP: ABNORMAL
RBC # BLD AUTO: 4.78 M/UL (ref 4–5.4)
RBC #/AREA URNS AUTO: 38 /HPF (ref 0–4)
SODIUM SERPL-SCNC: 140 MMOL/L (ref 136–145)
SP GR UR STRIP: 1.01 (ref 1–1.03)
URN SPEC COLLECT METH UR: ABNORMAL
WBC # BLD AUTO: 11.54 K/UL (ref 3.9–12.7)
WBC #/AREA URNS AUTO: 63 /HPF (ref 0–5)
WBC CLUMPS UR QL AUTO: ABNORMAL
YEAST UR QL AUTO: ABNORMAL

## 2023-04-16 PROCEDURE — G0378 HOSPITAL OBSERVATION PER HR: HCPCS

## 2023-04-16 PROCEDURE — 99285 EMERGENCY DEPT VISIT HI MDM: CPT | Mod: 25

## 2023-04-16 PROCEDURE — 25000003 PHARM REV CODE 250: Performed by: PHYSICIAN ASSISTANT

## 2023-04-16 PROCEDURE — 96375 TX/PRO/DX INJ NEW DRUG ADDON: CPT

## 2023-04-16 PROCEDURE — 87186 SC STD MICRODIL/AGAR DIL: CPT | Performed by: PHYSICIAN ASSISTANT

## 2023-04-16 PROCEDURE — 99285 PR EMERGENCY DEPT VISIT,LEVEL V: ICD-10-PCS | Mod: ,,, | Performed by: PHYSICIAN ASSISTANT

## 2023-04-16 PROCEDURE — 99222 1ST HOSP IP/OBS MODERATE 55: CPT | Mod: ,,, | Performed by: HOSPITALIST

## 2023-04-16 PROCEDURE — 81001 URINALYSIS AUTO W/SCOPE: CPT | Performed by: PHYSICIAN ASSISTANT

## 2023-04-16 PROCEDURE — 96367 TX/PROPH/DG ADDL SEQ IV INF: CPT

## 2023-04-16 PROCEDURE — 96365 THER/PROPH/DIAG IV INF INIT: CPT

## 2023-04-16 PROCEDURE — 99285 EMERGENCY DEPT VISIT HI MDM: CPT | Mod: ,,, | Performed by: PHYSICIAN ASSISTANT

## 2023-04-16 PROCEDURE — 12000002 HC ACUTE/MED SURGE SEMI-PRIVATE ROOM

## 2023-04-16 PROCEDURE — 80053 COMPREHEN METABOLIC PANEL: CPT | Performed by: EMERGENCY MEDICINE

## 2023-04-16 PROCEDURE — 99222 PR INITIAL HOSPITAL CARE,LEVL II: ICD-10-PCS | Mod: ,,, | Performed by: HOSPITALIST

## 2023-04-16 PROCEDURE — 83605 ASSAY OF LACTIC ACID: CPT | Performed by: PHYSICIAN ASSISTANT

## 2023-04-16 PROCEDURE — 96361 HYDRATE IV INFUSION ADD-ON: CPT

## 2023-04-16 PROCEDURE — 87086 URINE CULTURE/COLONY COUNT: CPT | Performed by: PHYSICIAN ASSISTANT

## 2023-04-16 PROCEDURE — 87088 URINE BACTERIA CULTURE: CPT | Performed by: PHYSICIAN ASSISTANT

## 2023-04-16 PROCEDURE — 87040 BLOOD CULTURE FOR BACTERIA: CPT | Performed by: PHYSICIAN ASSISTANT

## 2023-04-16 PROCEDURE — 87077 CULTURE AEROBIC IDENTIFY: CPT | Performed by: PHYSICIAN ASSISTANT

## 2023-04-16 PROCEDURE — 63600175 PHARM REV CODE 636 W HCPCS: Performed by: PHYSICIAN ASSISTANT

## 2023-04-16 PROCEDURE — 85025 COMPLETE CBC W/AUTO DIFF WBC: CPT | Performed by: PHYSICIAN ASSISTANT

## 2023-04-16 RX ORDER — SODIUM CHLORIDE 0.9 % (FLUSH) 0.9 %
10 SYRINGE (ML) INJECTION
Status: DISCONTINUED | OUTPATIENT
Start: 2023-04-16 | End: 2023-04-25

## 2023-04-16 RX ORDER — DEXTROSE 40 %
30 GEL (GRAM) ORAL
Status: DISCONTINUED | OUTPATIENT
Start: 2023-04-16 | End: 2023-04-26 | Stop reason: HOSPADM

## 2023-04-16 RX ORDER — TALC
6 POWDER (GRAM) TOPICAL NIGHTLY PRN
Status: DISCONTINUED | OUTPATIENT
Start: 2023-04-16 | End: 2023-04-26 | Stop reason: HOSPADM

## 2023-04-16 RX ORDER — ACETAMINOPHEN 325 MG/1
650 TABLET ORAL EVERY 4 HOURS PRN
Status: DISCONTINUED | OUTPATIENT
Start: 2023-04-16 | End: 2023-04-22

## 2023-04-16 RX ORDER — GABAPENTIN 100 MG/1
200 CAPSULE ORAL NIGHTLY
Status: DISCONTINUED | OUTPATIENT
Start: 2023-04-16 | End: 2023-04-26 | Stop reason: HOSPADM

## 2023-04-16 RX ORDER — HYDROCODONE BITARTRATE AND ACETAMINOPHEN 5; 325 MG/1; MG/1
1 TABLET ORAL
Status: COMPLETED | OUTPATIENT
Start: 2023-04-16 | End: 2023-04-16

## 2023-04-16 RX ORDER — SODIUM CHLORIDE 0.9 % (FLUSH) 0.9 %
10 SYRINGE (ML) INJECTION
Status: DISCONTINUED | OUTPATIENT
Start: 2023-04-16 | End: 2023-04-26 | Stop reason: HOSPADM

## 2023-04-16 RX ORDER — TRAMADOL HYDROCHLORIDE 50 MG/1
50 TABLET ORAL EVERY 6 HOURS
Status: DISCONTINUED | OUTPATIENT
Start: 2023-04-17 | End: 2023-04-22

## 2023-04-16 RX ORDER — ONDANSETRON 2 MG/ML
4 INJECTION INTRAMUSCULAR; INTRAVENOUS
Status: COMPLETED | OUTPATIENT
Start: 2023-04-16 | End: 2023-04-16

## 2023-04-16 RX ORDER — ENOXAPARIN SODIUM 100 MG/ML
40 INJECTION SUBCUTANEOUS EVERY 24 HOURS
Status: DISCONTINUED | OUTPATIENT
Start: 2023-04-17 | End: 2023-04-20

## 2023-04-16 RX ORDER — NALOXONE HCL 0.4 MG/ML
0.02 VIAL (ML) INJECTION
Status: DISCONTINUED | OUTPATIENT
Start: 2023-04-16 | End: 2023-04-26 | Stop reason: HOSPADM

## 2023-04-16 RX ORDER — DEXTROSE 40 %
15 GEL (GRAM) ORAL
Status: DISCONTINUED | OUTPATIENT
Start: 2023-04-16 | End: 2023-04-26 | Stop reason: HOSPADM

## 2023-04-16 RX ORDER — PROCHLORPERAZINE EDISYLATE 5 MG/ML
5 INJECTION INTRAMUSCULAR; INTRAVENOUS EVERY 6 HOURS PRN
Status: DISCONTINUED | OUTPATIENT
Start: 2023-04-16 | End: 2023-04-26 | Stop reason: HOSPADM

## 2023-04-16 RX ORDER — ONDANSETRON 2 MG/ML
4 INJECTION INTRAMUSCULAR; INTRAVENOUS EVERY 8 HOURS PRN
Status: DISCONTINUED | OUTPATIENT
Start: 2023-04-16 | End: 2023-04-26 | Stop reason: HOSPADM

## 2023-04-16 RX ADMIN — HYDROCODONE BITARTRATE AND ACETAMINOPHEN 1 TABLET: 5; 325 TABLET ORAL at 04:04

## 2023-04-16 RX ADMIN — ONDANSETRON 4 MG: 2 INJECTION INTRAMUSCULAR; INTRAVENOUS at 04:04

## 2023-04-16 RX ADMIN — ERTAPENEM 1 G: 1 INJECTION INTRAMUSCULAR; INTRAVENOUS at 06:04

## 2023-04-16 RX ADMIN — VANCOMYCIN HYDROCHLORIDE 1500 MG: 1.5 INJECTION, POWDER, LYOPHILIZED, FOR SOLUTION INTRAVENOUS at 07:04

## 2023-04-16 RX ADMIN — SODIUM CHLORIDE, POTASSIUM CHLORIDE, SODIUM LACTATE AND CALCIUM CHLORIDE 1000 ML: 600; 310; 30; 20 INJECTION, SOLUTION INTRAVENOUS at 04:04

## 2023-04-16 RX ADMIN — AMPICILLIN AND SULBACTAM 1.5 G: 1; .5 INJECTION, POWDER, FOR SOLUTION INTRAMUSCULAR; INTRAVENOUS at 05:04

## 2023-04-16 NOTE — ED NOTES
Patient identifiers verified and correct for [facility]  LOC: The patient is awake, alert and aware of environment with an appropriate affect, the patient is oriented x 3 and speaking appropriately.   APPEARANCE: Patient appears comfortable and in no acute distress, patient is clean and well groomed.  SKIN: The skin is warm and dry, color consistent with ethnicity, patient has normal skin turgor and moist mucus membranes, skin intact, no breakdown or bruising noted.   MUSCULOSKELETAL: Patient moving all extremities spontaneously, no swelling noted.  RESPIRATORY: Airway is open and patent, respirations are spontaneous, patient has a normal effort and rate, no accessory muscle use noted, pt placed on continuous pulse ox with O2 sats noted at 97% on room air.  CARDIAC: Pt placed on cardiac monitor. Patient has a normal rate and regular rhythm, no edema noted, capillary refill < 3 seconds.   GASTRO: Soft and non tender to palpation, no distention noted, normoactive bowel sounds present in all four quadrants. Pt states bowel movements have been regular.  : Pt has bilateral nephrostomy drains in place and is complaining of right flank pain.  NEURO: Pt opens eyes spontaneously, behavior appropriate to situation, follows commands, facial expression symmetrical, bilateral hand grasp equal and even, purposeful motor response noted, normal sensation in all extremities when touched with a finger.

## 2023-04-16 NOTE — ED TRIAGE NOTES
Flank Pain (Pt c/o flank pain. States has kidney stones. Pt has bilateral nephrostomy tubes in place. )

## 2023-04-16 NOTE — Clinical Note
Diagnosis: Right flank pain [726018]   Future Attending Provider: JEFFREY CADET [[id number]]   Is the patient being sent to ED Observation?: No   Admitting Provider:: JEFFREY CADET [[id number]]

## 2023-04-17 LAB
ALBUMIN SERPL BCP-MCNC: 3.1 G/DL (ref 3.5–5.2)
ALP SERPL-CCNC: 120 U/L (ref 55–135)
ALT SERPL W/O P-5'-P-CCNC: 12 U/L (ref 10–44)
ANION GAP SERPL CALC-SCNC: 9 MMOL/L (ref 8–16)
AST SERPL-CCNC: 19 U/L (ref 10–40)
BASOPHILS # BLD AUTO: 0.04 K/UL (ref 0–0.2)
BASOPHILS NFR BLD: 0.5 % (ref 0–1.9)
BILIRUB SERPL-MCNC: 0.4 MG/DL (ref 0.1–1)
BUN SERPL-MCNC: 14 MG/DL (ref 6–20)
CALCIUM SERPL-MCNC: 9.4 MG/DL (ref 8.7–10.5)
CHLORIDE SERPL-SCNC: 110 MMOL/L (ref 95–110)
CO2 SERPL-SCNC: 18 MMOL/L (ref 23–29)
CREAT SERPL-MCNC: 1.1 MG/DL (ref 0.5–1.4)
DIFFERENTIAL METHOD: ABNORMAL
EOSINOPHIL # BLD AUTO: 0.2 K/UL (ref 0–0.5)
EOSINOPHIL NFR BLD: 2.7 % (ref 0–8)
ERYTHROCYTE [DISTWIDTH] IN BLOOD BY AUTOMATED COUNT: 15.6 % (ref 11.5–14.5)
EST. GFR  (NO RACE VARIABLE): 57.5 ML/MIN/1.73 M^2
GLUCOSE SERPL-MCNC: 76 MG/DL (ref 70–110)
HCT VFR BLD AUTO: 39 % (ref 37–48.5)
HGB BLD-MCNC: 11.7 G/DL (ref 12–16)
IMM GRANULOCYTES # BLD AUTO: 0.02 K/UL (ref 0–0.04)
IMM GRANULOCYTES NFR BLD AUTO: 0.2 % (ref 0–0.5)
LYMPHOCYTES # BLD AUTO: 1.6 K/UL (ref 1–4.8)
LYMPHOCYTES NFR BLD: 19.5 % (ref 18–48)
MCH RBC QN AUTO: 27.4 PG (ref 27–31)
MCHC RBC AUTO-ENTMCNC: 30 G/DL (ref 32–36)
MCV RBC AUTO: 91 FL (ref 82–98)
MONOCYTES # BLD AUTO: 1.1 K/UL (ref 0.3–1)
MONOCYTES NFR BLD: 13.9 % (ref 4–15)
NEUTROPHILS # BLD AUTO: 5.2 K/UL (ref 1.8–7.7)
NEUTROPHILS NFR BLD: 63.2 % (ref 38–73)
NRBC BLD-RTO: 0 /100 WBC
PLATELET # BLD AUTO: 231 K/UL (ref 150–450)
PMV BLD AUTO: 10.3 FL (ref 9.2–12.9)
POTASSIUM SERPL-SCNC: 3.9 MMOL/L (ref 3.5–5.1)
PROT SERPL-MCNC: 6.7 G/DL (ref 6–8.4)
RBC # BLD AUTO: 4.27 M/UL (ref 4–5.4)
SODIUM SERPL-SCNC: 137 MMOL/L (ref 136–145)
WBC # BLD AUTO: 8.21 K/UL (ref 3.9–12.7)

## 2023-04-17 PROCEDURE — 99232 PR SUBSEQUENT HOSPITAL CARE,LEVL II: ICD-10-PCS | Mod: ,,, | Performed by: INTERNAL MEDICINE

## 2023-04-17 PROCEDURE — 85025 COMPLETE CBC W/AUTO DIFF WBC: CPT | Performed by: HOSPITALIST

## 2023-04-17 PROCEDURE — 99222 PR INITIAL HOSPITAL CARE,LEVL II: ICD-10-PCS | Mod: ,,, | Performed by: UROLOGY

## 2023-04-17 PROCEDURE — 94761 N-INVAS EAR/PLS OXIMETRY MLT: CPT

## 2023-04-17 PROCEDURE — 25000003 PHARM REV CODE 250: Performed by: PHYSICIAN ASSISTANT

## 2023-04-17 PROCEDURE — 63600175 PHARM REV CODE 636 W HCPCS: Performed by: INTERNAL MEDICINE

## 2023-04-17 PROCEDURE — 96372 THER/PROPH/DIAG INJ SC/IM: CPT | Performed by: HOSPITALIST

## 2023-04-17 PROCEDURE — 11000001 HC ACUTE MED/SURG PRIVATE ROOM

## 2023-04-17 PROCEDURE — 25000003 PHARM REV CODE 250: Performed by: HOSPITALIST

## 2023-04-17 PROCEDURE — 63600175 PHARM REV CODE 636 W HCPCS: Performed by: PHYSICIAN ASSISTANT

## 2023-04-17 PROCEDURE — 25000003 PHARM REV CODE 250: Performed by: INTERNAL MEDICINE

## 2023-04-17 PROCEDURE — 99222 1ST HOSP IP/OBS MODERATE 55: CPT | Mod: ,,, | Performed by: UROLOGY

## 2023-04-17 PROCEDURE — 99223 PR INITIAL HOSPITAL CARE,LEVL III: ICD-10-PCS | Mod: ,,, | Performed by: INTERNAL MEDICINE

## 2023-04-17 PROCEDURE — 63600175 PHARM REV CODE 636 W HCPCS: Performed by: HOSPITALIST

## 2023-04-17 PROCEDURE — 99232 SBSQ HOSP IP/OBS MODERATE 35: CPT | Mod: ,,, | Performed by: INTERNAL MEDICINE

## 2023-04-17 PROCEDURE — 36415 COLL VENOUS BLD VENIPUNCTURE: CPT | Performed by: HOSPITALIST

## 2023-04-17 PROCEDURE — G0378 HOSPITAL OBSERVATION PER HR: HCPCS

## 2023-04-17 PROCEDURE — 80053 COMPREHEN METABOLIC PANEL: CPT | Performed by: HOSPITALIST

## 2023-04-17 PROCEDURE — 99223 1ST HOSP IP/OBS HIGH 75: CPT | Mod: ,,, | Performed by: INTERNAL MEDICINE

## 2023-04-17 RX ADMIN — TRAMADOL HYDROCHLORIDE 50 MG: 50 TABLET, COATED ORAL at 12:04

## 2023-04-17 RX ADMIN — ERTAPENEM 1 G: 1 INJECTION INTRAMUSCULAR; INTRAVENOUS at 02:04

## 2023-04-17 RX ADMIN — GABAPENTIN 200 MG: 100 CAPSULE ORAL at 08:04

## 2023-04-17 RX ADMIN — VANCOMYCIN HYDROCHLORIDE 1500 MG: 1.5 INJECTION, POWDER, LYOPHILIZED, FOR SOLUTION INTRAVENOUS at 08:04

## 2023-04-17 RX ADMIN — TRAMADOL HYDROCHLORIDE 50 MG: 50 TABLET, COATED ORAL at 11:04

## 2023-04-17 RX ADMIN — GABAPENTIN 200 MG: 100 CAPSULE ORAL at 12:04

## 2023-04-17 RX ADMIN — ENOXAPARIN SODIUM 40 MG: 40 INJECTION SUBCUTANEOUS at 05:04

## 2023-04-17 RX ADMIN — TRAMADOL HYDROCHLORIDE 50 MG: 50 TABLET, COATED ORAL at 05:04

## 2023-04-17 RX ADMIN — Medication 6 MG: at 08:04

## 2023-04-17 NOTE — ED NOTES
Report from AMY Hodgson. Assumed care of 57 yo F presenting to the ED for evaluation of R flank pain. Pt has a history of frequent kidney stones.  Bilateral nephrostomies, L urostomy, and R colostomy noted; stool leakage noted around colostomy. Supplies to replace [facility] from [facility]. AO x4, resp even & unlabored, VSS. Denies pain at this time. Bed locked & in the lowest position, rails up x2, call button in reach.

## 2023-04-17 NOTE — ASSESSMENT & PLAN NOTE
-Pt. S/p transverson colono conduit and B/L nephrostomy tubes, now with owrsening R sided hydronephrosis  -Urology consulted, original plan for R PCNL (new access with IR arranged at the same time), R antegrade URS, stone basket extraction later this week. Will leave NPO @[facility] in case of need for expedited intervention

## 2023-04-17 NOTE — NURSING
Update: Patient arrived to the unit via [facility] with transport. Currently  on room air .  VS and H2T exam completed and recorded in Epic. Admissions navigator completed. NAD noted. Fall/Safety precautions maintained. Call light and personal items within reach. Communication board updated. Will remain NPO.    Nurses Note -- 4 Eyes      4/17/2023   1:00 AM      Skin assessed during: Admit      [x] No Pressure Injuries Present    []Prevention Measures Documented      [] Yes- Altered Skin Integrity Present or Discovered   [] LDA Added if Not in Epic (Describe Wound)   [] New Altered Skin Integrity was Present on Admit and Documented in LDA   [] Wound Image Taken    Wound Care Consulted? No    Attending Nurse:  Cassie Ibarra LPN     Second RN/Staff Member:  Trina REYES     Bilateral Nephrostomy tubes,  Ileostomy, Urostomy  all in place

## 2023-04-17 NOTE — SUBJECTIVE & OBJECTIVE
Past Medical History:   Diagnosis Date    Abnormal mammogram 08/25/2020    Acute blood loss anemia     Acute deep vein thrombosis (DVT) of lower extremity 12/09/2020    Advance care planning 04/30/2021    Anemia due to chronic blood loss     Anemia due to chronic kidney disease     Anxiety     Bilateral ureteral obstruction 9/11/2020    Cardiovascular event risk -- low 09/14/2015    ASCVD 10-year risk 1.9% (with optimal risk factors 1.3%) as of 9/14/2015     Cervical cancer 2014    Chronic back pain     Colostomy care     Deep vein thrombosis     Depression     Diarrhea due to malabsorption 11/14/2018    Diarrhea due to malabsorption 11/14/2018    Difficult intubation     Disorder of kidney and ureter     DVT of lower extremity, bilateral 11/04/2020    Emphysema of lung 4/10/2023    Fibromyalgia     Fungemia 09/27/2020    Generalized abdominal pain 08/25/2020    GERD (gastroesophageal reflux disease)     Hemifacial spasm 09/16/2015    Hiatal hernia 2014    History of cervical cancer 10/11/2018    Hx of psychiatric care     Cymbalta, trazodone    Hypertension     Hypomagnesemia 11/21/2018    Lactose intolerance     Metastatic squamous cell carcinoma to lymph node 10/11/2018    Nephrostomy tube displaced     Neuropathy due to chemotherapeutic drug 11/14/2018    Osteoarthritis of back     Peritonitis 09/22/2020    Pseudomonas urinary tract infection 04/21/2021    Psychiatric problem     Refusal of blood transfusions as patient is Rastafari     Ranjit's annette 09/14/2015    Seen on outside EGD 05/2014, underwent esophageal dilatation. Bx were negative.     Seizures     Sleep stage dysfunction     Noted on PSG 06/2017; negative for obstructive sleep apnea     Stroke     Urinary tract infection associated with nephrostomy catheter 06/11/2020    Wound infection after surgery 09/24/2020       Past Surgical History:   Procedure Laterality Date    ANTEGRADE NEPHROSTOGRAPHY Bilateral 9/28/2020    Procedure:  Nephrostogram - antegrade;  Surgeon: Leslie Balbuena MD;  Location: [facility] OR 1ST FLR;  Service: Urology;  Laterality: Bilateral;    ANTEGRADE NEPHROSTOGRAPHY Left 4/20/2021    Procedure: NEPHROSTOGRAM, ANTEGRADE;  Surgeon: Leslie Balbuena MD;  Location: [facility] OR 1ST FLR;  Service: Urology;  Laterality: Left;    ANTEGRADE NEPHROSTOGRAPHY Right 10/27/2022    Procedure: NEPHROSTOGRAM, ANTEGRADE;  Surgeon: Chirag Russ MD;  [facility];  Service: Urology;  Laterality: Right;    BILATERAL OOPHORECTOMY  2015    CHOLECYSTECTOMY  11/09/2016    Done at [facility], path showed chronic cholecystitis and gallstones    cold knife conization  2014    COLECTOMY, RIGHT  9/17/2020    Procedure: COLECTOMY, RIGHT Extended;  Surgeon: Hammad Reynolds MD;  Location: [facility] OR 2ND FLR;  Service: Colon and Rectal;;    COLONOSCOPY  2014    COLONOSCOPY N/A 10/24/2016    at [facility], Dr Gutiérrez    COLONOSCOPY N/A 5/18/2018    Procedure: COLONOSCOPY;  Surgeon: Arden Gutiérrez MD;  Location: [facility] (4TH FLR);  Service: Endoscopy;  Laterality: N/A;    COLONOSCOPY N/A 7/28/2020    Procedure: COLONOSCOPY;  Surgeon: Hammad Reynolds MD;  Location: [facility] ENDO (4TH FLR);  Service: Colon and Rectal;  Laterality: N/A;  covid test [place] 7/25    CYSTOSCOPY WITH URETEROSCOPY, RETROGRADE PYELOGRAPHY, AND INSERTION OF STENT Bilateral 3/21/2020    Procedure: CYSTOSCOPY, WITH RETROGRADE PYELOGRAM,;  Surgeon: Leslie Balbuena MD;  [facility];  Service: Urology;  Laterality: Bilateral;    DILATION OF NEPHROSTOMY TRACT Right 10/27/2022    Procedure: DILATION, NEPHROSTOMY TRACT;  Surgeon: Chirag Russ MD;  [facility];  Service: Urology;  Laterality: Right;    ESOPHAGOGASTRODUODENOSCOPY  2014    ESOPHAGOGASTRODUODENOSCOPY  11/18/2020    ESOPHAGOGASTRODUODENOSCOPY N/A 11/18/2020    Procedure: ESOPHAGOGASTRODUODENOSCOPY (EGD);  Surgeon: Zenon Spencer MD;  Location: [facility];  Service: Endoscopy;   Laterality: N/A;    ESOPHAGOGASTRODUODENOSCOPY N/A 12/11/2020    Procedure: EGD (ESOPHAGOGASTRODUODENOSCOPY);  Surgeon: Juancho Muse MD;  Location: [facility] ENDO (2ND FLR);  Service: Endoscopy;  Laterality: N/A;    HYSTERECTOMY  2014    [facility] for cervical cancer    ILEOSTOMY  9/17/2020    Procedure: CREATION, ILEOSTOMY;  Surgeon: Hammad Reynolds MD;  Location: [facility] OR 2ND FLR;  Service: Colon and Rectal;;    LOOPOGRAM N/A 4/20/2021    Procedure: LOOPOGRAM;  Surgeon: Leslie Balbuena MD;  Location: [facility] OR 1ST FLR;  Service: Urology;  Laterality: N/A;    MOBILIZATION OF SPLENIC FLEXURE N/A 9/11/2020    Procedure: MOBILIZATION, COLONIC;  Surgeon: Hammad Reynolds MD;  Location: [facility] OR 2ND FLR;  Service: Colon and Rectal;  Laterality: N/A;    NEPHROSTOGRAPHY Bilateral 4/17/2021    Procedure: Nephrostogram;  Surgeon: Celeste Surgeon;  Location: [facility];  Service: Anesthesiology;  Laterality: Bilateral;    OOPHORECTOMY      PYELOSCOPY Right 10/27/2022    Procedure: PYELOSCOPY;  Surgeon: Chirag Russ MD;  [facility];  Service: Urology;  Laterality: Right;    REPLACEMENT OF NEPHROSTOMY TUBE Right 10/27/2022    Procedure: REPLACEMENT, NEPHROSTOMY TUBE;  Surgeon: Chirag Russ MD;  [facility];  Service: Urology;  Laterality: Right;    RETROPERITONEAL LYMPHADENECTOMY Right 9/17/2018    Procedure: LYMPHADENECTOMY, RETROPERITONEUM;  Surgeon: Sebas Reed MD;  Location: [facility] OR 2ND FLR;  Service: General;  Laterality: Right;  ILIAC    URETEROSCOPIC REMOVAL OF URETERIC CALCULUS Right 10/27/2022    Procedure: REMOVAL, CALCULUS, URETER, URETEROSCOPIC;  Surgeon: Chirag Russ MD;  [facility];  Service: Urology;  Laterality: Right;    URETEROSCOPY Right 10/27/2022    Procedure: URETEROSCOPY-ANTEGRADE;  Surgeon: Chirag Russ MD;  Location: [facility] OR [facility] FLR;  Service: Urology;  Laterality: Right;       Review of patient's allergies indicates:   Allergen Reactions    Bee  sting [allergen ext-venom-honey bee]      Rash      Grass pollen-bermuda, standard      rash       No current facility-administered medications on file prior to encounter.     Current Outpatient Medications on File Prior to Encounter   Medication Sig    gabapentin (NEURONTIN) 100 MG capsule Take 2 capsules (200 mg total) by mouth every evening.    ketorolac (TORADOL) 10 mg tablet Take 1 tablet (10 mg total) by mouth every 6 (six) hours as needed for Pain.    sucralfate (CARAFATE) 1 gram tablet Take 1 tablet (1 g total) by mouth 4 (four) times daily before meals and nightly.    traMADoL (ULTRAM) 50 mg tablet Take 1 tablet (50 mg total) by mouth every 6 (six) hours.    acetaminophen (TYLENOL) 500 MG tablet Take 500 mg by mouth daily as needed for Pain.    albuterol (VENTOLIN HFA) 90 mcg/actuation inhaler Inhale 2 puffs into the lungs every 6 (six) hours as needed for Wheezing or Shortness of Breath. Rescue    melatonin (MELATIN) 3 mg tablet Take 2 tablets (6 mg total) by mouth nightly as needed for Insomnia.    mirtazapine (REMERON SOL-TAB) 15 MG disintegrating tablet Dissolve 1 tablet (15 mg total) by mouth nightly.    sodium bicarbonate 650 MG tablet Take 2 tablets (1,300 mg total) by mouth 2 (two) times daily.     Family History       Problem Relation (Age of Onset)    Breast cancer Maternal Aunt    Cancer Father, Mother    Cervical cancer Mother    Colon cancer Father    Drug abuse Daughter, Daughter    Esophageal cancer Father    Heart disease Sister, Maternal Grandmother    Suicide Daughter          Tobacco Use    Smoking status: Never    Smokeless tobacco: Never   Substance and Sexual Activity    Alcohol use: No     Alcohol/week: 0.0 standard drinks    Drug use: No    Sexual activity: Yes     Partners: Male     Birth control/protection: None     Comment:  17 years since 1999     Review of Systems   Constitutional:  Negative for activity change, appetite change, chills, fever and unexpected weight change.    HENT:  Negative for congestion and sore throat.    Respiratory:  Negative for cough and shortness of breath.    Cardiovascular:  Negative for chest pain, palpitations and leg swelling.   Gastrointestinal:  Negative for abdominal distention, abdominal pain, blood in stool, constipation, diarrhea, nausea and vomiting.   Genitourinary:  Positive for flank pain. Negative for difficulty urinating, dysuria and hematuria.   Musculoskeletal:  Positive for arthralgias. Negative for myalgias.   Skin:  Negative for color change and rash.   Neurological:  Negative for dizziness, tremors and seizures.   Objective:     Vital Signs (Most Recent):  Temp: 98.7 °F (37.1 °C) (04/16/23 1427)  Pulse: 61 (04/16/23 1901)  Resp: 18 (04/16/23 1627)  BP: (!) 105/57 (04/16/23 1901)  SpO2: 100 % (04/16/23 1901)   Vital Signs (24h Range):  Temp:  [98.7 °F (37.1 °C)] 98.7 °F (37.1 °C)  Pulse:  [61-79] 61  Resp:  [18-20] 18  SpO2:  [100 %] 100 %  BP: ()/(52-62) 105/57     Weight: 83.9 kg (185 lb)  Body mass index is 27.32 kg/m².    Physical Exam  Vitals reviewed.   Constitutional:       General: She is not in acute distress.     Appearance: She is well-developed.   HENT:      Head: Normocephalic and atraumatic.   Eyes:      Extraocular Movements: Extraocular movements intact.      Pupils: Pupils are equal, round, and reactive to light.   Neck:      Vascular: No JVD.      Trachea: No tracheal deviation.   Cardiovascular:      Rate and Rhythm: Normal rate and regular rhythm.      Heart sounds: No murmur heard.    No friction rub. No gallop.   Pulmonary:      Effort: No respiratory distress.      Breath sounds: Normal breath sounds. No wheezing or rales.   Abdominal:      General: Bowel sounds are normal. There is no distension.      Palpations: Abdomen is soft. There is no mass.      Tenderness: There is abdominal tenderness.      Comments: R sided CVA and flank tenderness, B/L nephrostomy tubes in place, ileostomy bag present, urostomy  present   Musculoskeletal:         General: No deformity.      Cervical back: Neck supple.   Lymphadenopathy:      Cervical: No cervical adenopathy.   Skin:     General: Skin is warm and dry.      Findings: No rash.   Neurological:      Mental Status: She is alert and oriented to person, place, and time.         CRANIAL NERVES     CN III, IV, VI   Pupils are equal, round, and reactive to light.     Significant Labs: All pertinent labs within the past 24 hours have been reviewed.    Significant Imaging: I have reviewed all pertinent imaging results/findings within the past 24 hours.

## 2023-04-17 NOTE — CONSULTS
[facility] - [facility]  Urology  Consult Note    Patient Name: Edita Riley  MRN: [id number]  Admission Date: 4/16/2023  Hospital Length of Stay: 0   Code Status: Full Code   Attending Provider: Wally Davenport MD   Consulting Provider: Maryann Darnell MD  Primary Care Physician: Primary Doctor No  Principal Problem:Urinary tract infection associated with nephrostomy catheter    Inpatient consult to Urology  Consult performed by: Maryann Darnell MD  Consult ordered by: Joe Muse MD          Subjective:     HPI:  Edita Riley is a 57 yo F with PMHx distal ureteral strictures s/p transverse colon conduit and B/L nephrostomy tubes complicated by MDR infections who presents to the ED complaining of R sided flank pain since last night.     She has a history of distal ureteral strictures due to XRT for cervical cancer.  She underwent a transverse colon conduit on 9/11/2020.  The patient had bilateral hydronephrosis with reflux on the right side and a sluggish ureter on the left side. Bilateral PCNTs since Apr 2021.  She underwent a percutaneous antegrade approach on 10/27/2022 for ureteral stones.     She had a CT scan done on one of her ER visits on 12/2/2022 which showed a 2 mm stone in the right ureter and a 6 mm stone in the lower pole.  The left NT was accidentally removed on 2/13/2023 and it was replaced on 2/14/2023.  The right was replaced at the same time, per IR's note. Return visit to the ED 3/1/23 for dislodged L NT which was replaced on 3/2/23.  CT shows bilateral nephrostomy tubes in correct anatomic position, new onset right sided hydronephrosis, no hydro on the left, 4mm stone in the mid right ureter with proximal hydroureter, small lower pole renal stones in the right kidney.     She was recently seen in urology clinic and noted to have worsening R sided hydronephrosis with recent CT revealing Right NT appearing to be bypassing the renal parenchyma and directly entering the renal pelvis.  She  also has a 4 mm R mid ureteral stone. She was initially planned to be direct admitted tomorrow for IV abx prior to R PCNL (new access with IR), R antegrade URS, stone basket extraction planned for 4/21.  Bilateral PCNT last exchanged on 3/28.      On assessment, she is AFVSS.  She denies fevers, chills.  Since admission overnight, R NT w/ 90 cc of output and urostomy with 300 cc of output.  WBC 8, Cr 1.1 at baseline.  UA significant for 38 RBCs, 63 WBCs, moderate bacteria.  Blood cx and urine cx in process.  She is currently on vanc/ertapenem and ID has been consulted.                     Past Medical History:   Diagnosis Date    Abnormal mammogram 08/25/2020    Acute blood loss anemia     Acute deep vein thrombosis (DVT) of lower extremity 12/09/2020    Advance care planning 04/30/2021    Anemia due to chronic blood loss     Anemia due to chronic kidney disease     Anxiety     Bilateral ureteral obstruction 9/11/2020    Cardiovascular event risk -- low 09/14/2015    ASCVD 10-year risk 1.9% (with optimal risk factors 1.3%) as of 9/14/2015     Cervical cancer 2014    Chronic back pain     Colostomy care     Deep vein thrombosis     Depression     Diarrhea due to malabsorption 11/14/2018    Diarrhea due to malabsorption 11/14/2018    Difficult intubation     Disorder of kidney and ureter     DVT of lower extremity, bilateral 11/04/2020    Emphysema of lung 4/10/2023    Fibromyalgia     Fungemia 09/27/2020    Generalized abdominal pain 08/25/2020    GERD (gastroesophageal reflux disease)     Hemifacial spasm 09/16/2015    Hiatal hernia 2014    History of cervical cancer 10/11/2018    Hx of psychiatric care     Cymbalta, trazodone    Hypertension     Hypomagnesemia 11/21/2018    Lactose intolerance     Metastatic squamous cell carcinoma to lymph node 10/11/2018    Nephrostomy tube displaced     Neuropathy due to chemotherapeutic drug 11/14/2018    Osteoarthritis of back     Peritonitis 09/22/2020    Pseudomonas urinary  tract infection 04/21/2021    Psychiatric problem     Refusal of blood transfusions as patient is [employer]     Schatzki's ring 09/14/2015    Seen on outside EGD 05/2014, underwent esophageal dilatation. Bx were negative.     Seizures     Sleep stage dysfunction     Noted on PSG 06/2017; negative for obstructive sleep apnea     Stroke     Urinary tract infection associated with nephrostomy catheter 06/11/2020    Wound infection after surgery 09/24/2020       Past Surgical History:   Procedure Laterality Date    ANTEGRADE NEPHROSTOGRAPHY Bilateral 9/28/2020    Procedure: Nephrostogram - antegrade;  Surgeon: Leslie Balbuena MD;  [facility];  Service: Urology;  Laterality: Bilateral;    ANTEGRADE NEPHROSTOGRAPHY Left 4/20/2021    Procedure: NEPHROSTOGRAM, ANTEGRADE;  Surgeon: Leslie Balbuena MD;  [facility];  Service: Urology;  Laterality: Left;    ANTEGRADE NEPHROSTOGRAPHY Right 10/27/2022    Procedure: NEPHROSTOGRAM, ANTEGRADE;  Surgeon: Chirag Russ MD;  [facility];  Service: Urology;  Laterality: Right;    BILATERAL OOPHORECTOMY  2015    CHOLECYSTECTOMY  11/09/2016    Done at [facility], path showed chronic cholecystitis and gallstones    cold knife conization  2014    COLECTOMY, RIGHT  9/17/2020    Procedure: COLECTOMY, RIGHT Extended;  Surgeon: Hammad Reynolds MD;  Location: [facility] OR 2ND FLR;  Service: Colon and Rectal;;    COLONOSCOPY  2014    COLONOSCOPY N/A 10/24/2016    at [facility], Dr Thomas    COLONOSCOPY N/A 5/18/2018    Procedure: COLONOSCOPY;  Surgeon: Arden Gutiérrez MD;  Location: [facility] (4TH FLR);  Service: Endoscopy;  Laterality: N/A;    COLONOSCOPY N/A 7/28/2020    Procedure: COLONOSCOPY;  Surgeon: Hammad Reynolds MD;  Location: [facility] ENDO (4TH FLR);  Service: Colon and Rectal;  Laterality: N/A;  covid test [place] 7/25    CYSTOSCOPY WITH URETEROSCOPY, RETROGRADE PYELOGRAPHY, AND INSERTION OF STENT Bilateral 3/21/2020    Procedure:  CYSTOSCOPY, WITH RETROGRADE PYELOGRAM,;  Surgeon: Leslie Balbuena MD;  Location: [facility] OR 1ST FLR;  Service: Urology;  Laterality: Bilateral;    DILATION OF NEPHROSTOMY TRACT Right 10/27/2022    Procedure: DILATION, NEPHROSTOMY TRACT;  Surgeon: Chirag Russ MD;  Location: [facility] OR 1ST FLR;  Service: Urology;  Laterality: Right;    ESOPHAGOGASTRODUODENOSCOPY  2014    ESOPHAGOGASTRODUODENOSCOPY  11/18/2020    ESOPHAGOGASTRODUODENOSCOPY N/A 11/18/2020    Procedure: ESOPHAGOGASTRODUODENOSCOPY (EGD);  Surgeon: Zenon Spencer MD;  Location: [facility] ENDO;  Service: Endoscopy;  Laterality: N/A;    ESOPHAGOGASTRODUODENOSCOPY N/A 12/11/2020    Procedure: EGD (ESOPHAGOGASTRODUODENOSCOPY);  Surgeon: Juancho Muse MD;  Location: [facility] (2ND FLR);  Service: Endoscopy;  Laterality: N/A;    HYSTERECTOMY  2014    [facility] for cervical cancer    ILEOSTOMY  9/17/2020    Procedure: CREATION, ILEOSTOMY;  Surgeon: Hammad Reynolds MD;  Location: [facility] OR 2ND FLR;  Service: Colon and Rectal;;    LOOPOGRAM N/A 4/20/2021    Procedure: LOOPOGRAM;  Surgeon: Leslie Balbuena MD;  Location: [facility] OR 1ST FLR;  Service: Urology;  Laterality: N/A;    MOBILIZATION OF SPLENIC FLEXURE N/A 9/11/2020    Procedure: MOBILIZATION, COLONIC;  Surgeon: Hammad Reynolds MD;  Location: [facility] OR 2ND FLR;  Service: Colon and Rectal;  Laterality: N/A;    NEPHROSTOGRAPHY Bilateral 4/17/2021    Procedure: Nephrostogram;  Surgeon: Celeste Surgeon;  Location: [facility] CELESTE;  Service: Anesthesiology;  Laterality: Bilateral;    OOPHORECTOMY      PYELOSCOPY Right 10/27/2022    Procedure: PYELOSCOPY;  Surgeon: Chirag Russ MD;  Location: [facility] OR 1ST FLR;  Service: Urology;  Laterality: Right;    REPLACEMENT OF NEPHROSTOMY TUBE Right 10/27/2022    Procedure: REPLACEMENT, NEPHROSTOMY TUBE;  Surgeon: Chirag Russ MD;  Location: [facility] OR 1ST FLR;  Service: Urology;  Laterality: Right;    RETROPERITONEAL LYMPHADENECTOMY Right 9/17/2018    Procedure: LYMPHADENECTOMY,  RETROPERITONEUM;  Surgeon: Sebas Reed MD;  Location: [facility] OR 2ND FLR;  Service: General;  Laterality: Right;  ILIAC    URETEROSCOPIC REMOVAL OF URETERIC CALCULUS Right 10/27/2022    Procedure: REMOVAL, CALCULUS, URETER, URETEROSCOPIC;  Surgeon: Chirag Russ MD;  Location: [facility] OR 1ST FLR;  Service: Urology;  Laterality: Right;    URETEROSCOPY Right 10/27/2022    Procedure: URETEROSCOPY-ANTEGRADE;  Surgeon: Chirag Russ MD;  [facility];  Service: Urology;  Laterality: Right;       Review of patient's allergies indicates:   Allergen Reactions    Bee sting [allergen ext-venom-honey bee]      Rash      Grass pollen-bermuda, standard      rash       Family History       Problem Relation (Age of Onset)    Breast cancer Maternal Aunt    Cancer Father, Mother    Cervical cancer Mother    Colon cancer Father    Drug abuse Daughter, Daughter    Esophageal cancer Father    Heart disease Sister, Maternal Grandmother    Suicide Daughter            Tobacco Use    Smoking status: Never    Smokeless tobacco: Never   Substance and Sexual Activity    Alcohol use: No     Alcohol/week: 0.0 standard drinks    Drug use: No    Sexual activity: Yes     Partners: Male     Birth control/protection: None     Comment:  19 years since 1999       Review of Systems   Constitutional:  Negative for chills and fever.   HENT:  Negative for trouble swallowing.    Respiratory:  Negative for cough and shortness of breath.    Cardiovascular:  Negative for chest pain.   Gastrointestinal:  Negative for abdominal distention, abdominal pain and vomiting.   Genitourinary:  Positive for flank pain. Negative for decreased urine volume and dysuria.        R flank pain    Musculoskeletal:  Negative for back pain.   Skin:  Negative for pallor.   Neurological:  Negative for weakness and numbness.   Psychiatric/Behavioral:  Negative for agitation.      Objective:     Temp:  [97.8 °F (36.6 °C)-98.7 °F (37.1 °C)] 97.8 °F (36.6  °C)  Pulse:  [61-79] 65  Resp:  [14-20] 14  SpO2:  [98 %-100 %] 98 %  BP: ()/(52-62) 114/53     Body mass index is 29.17 kg/m².           Drains       Drain  Duration                  Urostomy 09/11/20 0000 ileal conduit  days         Ileostomy 09/18/20  days         Nephrostomy 02/14/23 Right 62 days         Nephrostomy 03/28/23 0953 Left 12 Fr. 19 days         Nephrostomy 03/28/23 0953 Right 12 Fr. 19 days                    Physical Exam  Constitutional:       Appearance: She is not toxic-appearing.   HENT:      Head: Normocephalic.   Cardiovascular:      Rate and Rhythm: Normal rate.   Pulmonary:      Effort: Pulmonary effort is normal. No respiratory distress.   Abdominal:      General: Abdomen is flat. There is no distension.      Palpations: Abdomen is soft.      Comments: Urostomy draining c/y/u.  R NT flushed with 10 cc sterile water, cloudy urine return with sediment.  L NT draining minimal light red thin urine.     Musculoskeletal:         General: No swelling or deformity. Normal range of motion.      Cervical back: Normal range of motion.   Skin:     General: Skin is warm and dry.      Findings: No erythema.   Neurological:      General: No focal deficit present.      Mental Status: She is alert.      Motor: No weakness.   Psychiatric:         Mood and Affect: Mood normal.         Behavior: Behavior normal.       Significant Labs:    BMP:  Recent Labs   Lab 04/10/23  1516 04/16/23  1741 04/17/23  0357    140 137   K 4.3 4.4 3.9    110 110   CO2 20* 18* 18*   BUN 30* 16 14   CREATININE 1.3 1.1 1.1   CALCIUM 9.8 9.3 9.4       CBC:  Recent Labs   Lab 04/16/23  1621 04/17/23  0357   WBC 11.54 8.21   HGB 13.1 11.7*   HCT 44.1 39.0    231       Blood Culture:   Recent Labs   Lab 04/16/23  1602 04/16/23  1621   LABBLOO No Growth to date No Growth to date     Urine Studies:   Recent Labs   Lab 04/16/23  1746   COLORU Yellow   APPEARANCEUA Hazy*   PHUR 6.0   SPECGRAV 1.015    PROTEINUA 1+*   GLUCUA Negative   KETONESU Negative   BILIRUBINUA Negative   OCCULTUA 1+*   NITRITE Negative   LEUKOCYTESUR 3+*   RBCUA 38*   WBCUA 63*   BACTERIA Moderate*   HYALINECASTS 0     All pertinent labs results from the past 24 hours have been reviewed.  Recent Lab Results  (Last 5 results in the past 24 hours)        04/17/23  0357   04/16/23  1746   04/16/23  1741   04/16/23  1621   04/16/23  1602        Albumin 3.1     3.2           Alkaline Phosphatase 120     115           ALT 12     8           Anion Gap 9     12           Appearance, UA   Hazy             AST 19     15           Bacteria, UA   Moderate             Baso # 0.04       0.05         Basophil % 0.5       0.4         Bilirubin (UA)   Negative             BILIRUBIN TOTAL 0.4  Comment: For infants and newborns, interpretation of results should be based  on gestational age, weight and in agreement with clinical  observations.    Premature Infant recommended reference ranges:  Up to 24 hours.............<8.0 mg/dL  Up to 48 hours............<12.0 mg/dL  3-5 days..................<15.0 mg/dL  6-29 days.................<15.0 mg/dL       0.3  Comment: For infants and newborns, interpretation of results should be based  on gestational age, weight and in agreement with clinical  observations.    Premature Infant recommended reference ranges:  Up to 24 hours.............<8.0 mg/dL  Up to 48 hours............<12.0 mg/dL  3-5 days..................<15.0 mg/dL  6-29 days.................<15.0 mg/dL             Blood Culture, Routine       No Growth to date  [P]   No Growth to date  [P]       BUN 14     16           Calcium 9.4     9.3           Chloride 110     110           CO2 18     18           Color, UA   Yellow             Creatinine 1.1     1.1           Differential Method Automated       Automated         eGFR 57.5     57.5           Eos # 0.2       0.2         Eosinophil % 2.7       1.6         Glucose 76     83           Glucose, UA    Negative             Gran # (ANC) 5.2       8.3         Gran % 63.2       72.2         Hematocrit 39.0       44.1         Hemoglobin 11.7       13.1         Hyaline Casts, UA   0             Immature Grans (Abs) 0.02  Comment: Mild elevation in immature granulocytes is non specific and   can be seen in a variety of conditions including stress response,   acute inflammation, trauma and pregnancy. Correlation with other   laboratory and clinical findings is essential.         0.02  Comment: Mild elevation in immature granulocytes is non specific and   can be seen in a variety of conditions including stress response,   acute inflammation, trauma and pregnancy. Correlation with other   laboratory and clinical findings is essential.           Immature Granulocytes 0.2       0.2         Ketones, UA   Negative             Lactate, Nicko       1.4  Comment: Falsely low lactic acid results can be found in samples   containing >=13.0 mg/dL total bilirubin and/or >=3.5 mg/dL   direct bilirubin.           Leukocytes, UA   3+             Lymph # 1.6       1.5         Lymph % 19.5       12.9         MCH 27.4       27.4         MCHC 30.0       29.7         MCV 91       92         Microscopic Comment   SEE COMMENT  Comment: Other formed elements not mentioned in the report are not   present in the microscopic examination.                Mono # 1.1       1.5         Mono % 13.9       12.7         MPV 10.3       10.8         NITRITE UA   Negative             nRBC 0       0         Occult Blood UA   1+             pH, UA   6.0             Platelets 231       267         Potassium 3.9     4.4           PROTEIN TOTAL 6.7     6.6           Protein, UA   1+  Comment: Recommend a 24 hour urine protein or a urine   protein/creatinine ratio if globulin induced proteinuria is  clinically suspected.               RBC 4.27       4.78         RBC, UA   38             RDW 15.6       15.5         Sodium 137     140           Specific [place], UA    1.015             Specimen UA   Urine, Unspecified             WBC Clumps, UA   Moderate             WBC, UA   63             WBC 8.21       11.54         Yeast, UA   MODERATE                                     [P] - Preliminary Result               Significant Imaging:  All pertinent imaging results/findings from the past 24 hours have been reviewed.                      Assessment and Plan:     Bilateral ureteral obstruction  Edita Riley is a 57 yo F with PMHx distal ureteral strictures 2/2 XRT for cervical cancer, s/p transverse colon conduit and B/L nephrostomy tubes complicated by MDR infections who presents to the ED complaining of R sided flank pain since last night.  She is scheduled for R PCNL (new access with IR), R antegrade URS, stone basket extraction planned for mid ureteral stone on 4/21.   - continue IV abx, f/u ID recs   - currently on vac/ertapenem   - ok for diet from urology perspective   - NPO at midnight Thursday  - R PCNL on Friday 4/21   - PRN antiemetics, MM pain control   - rest of care per primary           VTE Risk Mitigation (From admission, onward)           Ordered     enoxaparin injection 40 mg  Daily         04/16/23 1930     IP VTE HIGH RISK PATIENT  Once         04/16/23 1930     Place sequential compression device  Until discontinued         04/16/23 1930                    Thank you for your consult. I will follow-up with patient. Please contact us if you have any additional questions.    Maryann Darnell MD  Urology  [facility] - Surgery    I have reviewed and concur with the resident's history, physical, assessment, and plan.  I have personally interviewed and examined the patient at bedside.  See below addendum for my evaluation and additional findings.    Plan remains to perform PCNL on Friday.  Continue IV abx until that time.

## 2023-04-17 NOTE — SUBJECTIVE & OBJECTIVE
Interval History: UC-NGTD . Afebrile , feeling better. On vanc and ertapenem. Urology plans on procedure on Friday.    Review of Systems   Constitutional:  Negative for activity change, appetite change, chills, fever and unexpected weight change.   HENT:  Negative for congestion and sore throat.    Respiratory:  Negative for cough and shortness of breath.    Cardiovascular:  Negative for chest pain, palpitations and leg swelling.   Gastrointestinal:  Negative for abdominal distention, abdominal pain, blood in stool, constipation, diarrhea, nausea and vomiting.   Genitourinary:  Positive for flank pain. Negative for difficulty urinating, dysuria and hematuria.   Musculoskeletal:  Positive for arthralgias. Negative for myalgias.   Skin:  Negative for color change and rash.   Neurological:  Negative for dizziness, tremors and seizures.   Objective:     Vital Signs (Most Recent):  Temp: 97.5 °F (36.4 °C) (04/17/23 0719)  Pulse: 65 (04/17/23 0719)  Resp: 16 (04/17/23 0719)  BP: (!) 120/59 (04/17/23 0719)  SpO2: 98 % (04/17/23 0719)   Vital Signs (24h Range):  Temp:  [97.5 °F (36.4 °C)-98.7 °F (37.1 °C)] 97.5 °F (36.4 °C)  Pulse:  [61-79] 65  Resp:  [14-20] 16  SpO2:  [98 %-100 %] 98 %  BP: ()/(52-62) 120/59     Weight: 89.6 kg (197 lb 8.5 oz)  Body mass index is 29.17 kg/m².    Intake/Output Summary (Last 24 hours) at 4/17/2023 0958  Last data filed at 4/17/2023 0525  Gross per 24 hour   Intake 20 ml   Output 660 ml   Net -640 ml      Physical Exam  Vitals reviewed.   Constitutional:       General: She is not in acute distress.     Appearance: She is well-developed.   HENT:      Head: Normocephalic and atraumatic.   Eyes:      Extraocular Movements: Extraocular movements intact.      Pupils: Pupils are equal, round, and reactive to light.   Neck:      Vascular: No JVD.      Trachea: No tracheal deviation.   Cardiovascular:      Rate and Rhythm: Normal rate and regular rhythm.      Heart sounds: No murmur heard.     No friction rub. No gallop.   Pulmonary:      Effort: No respiratory distress.      Breath sounds: Normal breath sounds. No wheezing or rales.   Abdominal:      General: Bowel sounds are normal. There is no distension.      Palpations: Abdomen is soft. There is no mass.      Tenderness: There is abdominal tenderness.      Comments: R sided CVA and flank tenderness, B/L nephrostomy tubes in place, ileostomy bag present, urostomy present   Musculoskeletal:         General: No deformity.      Cervical back: Neck supple.   Lymphadenopathy:      Cervical: No cervical adenopathy.   Skin:     General: Skin is warm and dry.      Findings: No rash.   Neurological:      Mental Status: She is alert and oriented to person, place, and time.       Significant Labs: All pertinent labs within the past 24 hours have been reviewed.  BMP:   Recent Labs   Lab 04/17/23  0357   GLU 76      K 3.9      CO2 18*   BUN 14   CREATININE 1.1   CALCIUM 9.4       Significant Imaging: I have reviewed all pertinent imaging results/findings within the past 24 hours.

## 2023-04-17 NOTE — ASSESSMENT & PLAN NOTE
-Pt. 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 IV vancomycin and ertapenem started in ED, will continue vanc and consult ID for further abx recommendations. Repeat cultures ordered

## 2023-04-17 NOTE — HPI
Edita Hardin [facility] is a 59 yo F with PMHx distal ureteral strictures s/p transverse colon conduit and B/L nephrostomy tubes complicated by MDR infections who presents to the ED complaining of R sided flank pain since last night.     She has a history of distal ureteral strictures due to XRT for cervical cancer.  She underwent a transverse colon conduit on 9/11/2020.  The patient had bilateral hydronephrosis with reflux on the right side and a sluggish ureter on the left side. Bilateral PCNTs since Apr 2021.  She underwent a percutaneous antegrade approach on 10/27/2022 for ureteral stones.     She had a CT scan done on one of her ER visits on 12/2/2022 which showed a 2 mm stone in the right ureter and a 6 mm stone in the lower pole.  The left NT was accidentally removed on 2/13/2023 and it was replaced on 2/14/2023.  The right was replaced at the same time, per IR's note. Return visit to the ED 3/1/23 for dislodged L NT which was replaced on 3/2/23.  CT shows bilateral nephrostomy tubes in correct anatomic position, new onset right sided hydronephrosis, no hydro on the left, 4mm stone in the mid right ureter with proximal hydroureter, small lower pole renal stones in the right kidney.     She was recently seen in urology clinic and noted to have worsening R sided hydronephrosis with recent CT revealing Right NT appearing to be bypassing the renal parenchyma and directly entering the renal pelvis.  She also has a 4 mm R mid ureteral stone. She was initially planned to be direct admitted tomorrow for IV abx prior to R PCNL (new access with IR), R antegrade URS, stone basket extraction planned for 4/21.  Bilateral PCNT last exchanged on 3/28.      On assessment, she is AFVSS.  She denies fevers, chills.  Since admission overnight, R NT w/ 90 cc of output and urostomy with 300 cc of output.  WBC 8, Cr 1.1 at baseline.  UA significant for 38 RBCs, 63 WBCs, moderate bacteria.  Blood cx and urine cx in process.  She  is currently on vanc/ertapenem and ID has been consulted.

## 2023-04-17 NOTE — HPI
59 yo F with PMHx distal ureteral strictures due to XRT for cervical cancer s/p transverse colon conduit and B/L nephrostomy tubes complicated by MDR infections who presents to the ED complaining of R sided flank pain since today. Pt. has been seeing Urology for strictures, hydronephrosis, and nephrolithiasis. She was recently noted to have worsening R sided hydronephrosis with imaging revealing Right nephrostomy tube appears to be bypassing the renal parenchyma and directly entering the renal pelvis as well as a 4 mm R mid ureteral stone. She was to be direct admitted tomorrow  for R PCNL, R antegrade URS, stone basket extraction and with pre-op antibiotics. Pt. Reports that she developed severe R sided flank pain today, however, so she came to the ED tonight. She denies any fevers, chills, weakness, or changes in urine output. She just reports that her flank pain worsened to the point that she thought her kidneys were worsening, so she came to the hospital today.

## 2023-04-17 NOTE — PROGRESS NOTES
[facility] Medicine  Progress Note    Patient Name: Edita Riley  MRN: [id number]  Patient Class: OP- Observation   Admission Date: 4/16/2023  Length of Stay: 0 days  Attending Physician: Wally Davenport MD  Primary Care Provider: Primary Doctor No        Subjective:     Principal Problem:Urinary tract infection associated with nephrostomy catheter        HPI:  59 yo F with PMHx distal ureteral strictures due to XRT for cervical cancer s/p transverse colon conduit and B/L nephrostomy tubes complicated by MDR infections who presents to the ED complaining of R sided flank pain since today. Pt. has been seeing Urology for strictures, hydronephrosis, and nephrolithiasis. She was recently noted to have worsening R sided hydronephrosis with imaging revealing Right nephrostomy tube appears to be bypassing the renal parenchyma and directly entering the renal pelvis as well as a 4 mm R mid ureteral stone. She was to be direct admitted tomorrow  for R PCNL, R antegrade URS, stone basket extraction and with pre-op antibiotics. Pt. Reports that she developed severe R sided flank pain today, however, so she came to the ED tonight. She denies any fevers, chills, weakness, or changes in urine output. She just reports that her flank pain worsened to the point that she thought her kidneys were worsening, so she came to the hospital today.              Overview/Hospital Course:  No notes on file    Interval History: UC-NGTD . Afebrile , feeling better. On vanc and ertapenem. Urology plans on procedure on Friday.    Review of Systems   Constitutional:  Negative for activity change, appetite change, chills, fever and unexpected weight change.   HENT:  Negative for congestion and sore throat.    Respiratory:  Negative for cough and shortness of breath.    Cardiovascular:  Negative for chest pain, palpitations and leg swelling.   Gastrointestinal:  Negative for abdominal distention, abdominal pain, blood in  stool, constipation, diarrhea, nausea and vomiting.   Genitourinary:  Positive for flank pain. Negative for difficulty urinating, dysuria and hematuria.   Musculoskeletal:  Positive for arthralgias. Negative for myalgias.   Skin:  Negative for color change and rash.   Neurological:  Negative for dizziness, tremors and seizures.   Objective:     Vital Signs (Most Recent):  Temp: 97.5 °F (36.4 °C) (04/17/23 0719)  Pulse: 65 (04/17/23 0719)  Resp: 16 (04/17/23 0719)  BP: (!) 120/59 (04/17/23 0719)  SpO2: 98 % (04/17/23 0719)   Vital Signs (24h Range):  Temp:  [97.5 °F (36.4 °C)-98.7 °F (37.1 °C)] 97.5 °F (36.4 °C)  Pulse:  [61-79] 65  Resp:  [14-20] 16  SpO2:  [98 %-100 %] 98 %  BP: ()/(52-62) 120/59     Weight: 89.6 kg (197 lb 8.5 oz)  Body mass index is 29.17 kg/m².    Intake/Output Summary (Last 24 hours) at 4/17/2023 0958  Last data filed at 4/17/2023 0525  Gross per 24 hour   Intake 20 ml   Output 660 ml   Net -640 ml      Physical Exam  Vitals reviewed.   Constitutional:       General: She is not in acute distress.     Appearance: She is well-developed.   HENT:      Head: Normocephalic and atraumatic.   Eyes:      Extraocular Movements: Extraocular movements intact.      Pupils: Pupils are equal, round, and reactive to light.   Neck:      Vascular: No JVD.      Trachea: No tracheal deviation.   Cardiovascular:      Rate and Rhythm: Normal rate and regular rhythm.      Heart sounds: No murmur heard.    No friction rub. No gallop.   Pulmonary:      Effort: No respiratory distress.      Breath sounds: Normal breath sounds. No wheezing or rales.   Abdominal:      General: Bowel sounds are normal. There is no distension.      Palpations: Abdomen is soft. There is no mass.      Tenderness: There is abdominal tenderness.      Comments: R sided CVA and flank tenderness, B/L nephrostomy tubes in place, ileostomy bag present, urostomy present   Musculoskeletal:         General: No deformity.      Cervical back: Neck  supple.   Lymphadenopathy:      Cervical: No cervical adenopathy.   Skin:     General: Skin is warm and dry.      Findings: No rash.   Neurological:      Mental Status: She is alert and oriented to person, place, and time.       Significant Labs: All pertinent labs within the past 24 hours have been reviewed.  BMP:   Recent Labs   Lab 04/17/23  0357   GLU 76      K 3.9      CO2 18*   BUN 14   CREATININE 1.1   CALCIUM 9.4       Significant Imaging: I have reviewed all pertinent imaging results/findings within the past 24 hours.      Assessment/Plan:      * Urinary tract infection associated with nephrostomy catheter  -Pt. 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 IV vancomycin and ertapenem started in ED, will continue vanc and consult ID for further abx recommendations. Repeat cultures ordered      CKD (chronic kidney disease), stage III  -Cr stable at 1.3, no acute issues      Ileostomy in place  -Pt. transverse colon conduit 2020 complicated by bowel perforation requiring hemicolectomy and ileostomy. No acute issues, ostomy bag in place      Bilateral ureteral obstruction  -Pt. S/p transverson colono conduit and B/L nephrostomy tubes, now with owrsening R sided hydronephrosis  -Urology consulted, original plan for R PCNL (new access with IR arranged at the same time), R antegrade URS, stone basket extraction later this week. Will leave NPO @[facility] in case of need for expedited intervention        VTE Risk Mitigation (From admission, onward)         Ordered     enoxaparin injection 40 mg  Daily         04/16/23 1930     IP VTE HIGH RISK PATIENT  Once         04/16/23 1930     Place sequential compression device  Until discontinued         04/16/23 1930                Discharge Planning   OK: 4/22/2023     Code Status: Full Code   Is the patient medically ready for discharge?:     Reason for patient  still in hospital (select all that apply): Patient unstable                     Wally Davenport MD  Department of Hospital Medicine   [facility] - Surgery

## 2023-04-17 NOTE — PLAN OF CARE
[facility] - Surgery  Initial Discharge Assessment       Primary Care Provider: Primary Doctor No    Admission Diagnosis: Right flank pain [R10.9]  Complicated UTI (urinary tract infection) [N39.0]    Admission Date: 4/16/2023  Expected Discharge Date: 4/22/2023    Discharge Barriers Identified: None    Payor: MEDICAID / Plan: [employer] [facility] ([employer]) / Product Type: Managed Medicaid /     Extended Emergency Contact Information  Primary Emergency Contact: RosemaryHammad  Address: [address] [place] [address]  Home Phone: [phone]  Relation: Spouse  Secondary Emergency Contact: Federico Riley  Mobile Phone: [phone]  Relation: Daughter    Discharge Plan A: Home with family  Discharge Plan B: Home Health, Home with family      [id number] - [place], [place] - [address]  [address]  [place] [address]  Phone: [phone] Fax: [phone]    [facility]  [address] [id number]  Phone: [phone] Fax: [phone]      Initial Assessment (most recent)       Adult Discharge Assessment - 04/17/23 1032          Discharge Assessment    Assessment Type Discharge Planning Assessment     Confirmed/corrected address, phone number and insurance Yes     Confirmed Demographics Correct on Facesheet     Source of Information patient     Communicated OK with patient/caregiver Yes     People in Home child(abigail), adult;spouse     Do you expect to return to your current living situation? Yes     Do you have help at home or someone to help you manage your care at home? Yes     Who are your caregiver(s) and their phone number(s)? Federico daughter and spouse     Prior to hospitilization cognitive status: Alert/Oriented     Current cognitive status: Alert/Oriented     Home Layout Able to live on 1st floor     Equipment Currently Used at Home wheelchair;walker, rolling     Do you currently have service(s)  that help you manage your care at home? No     Do you take prescription medications? Yes     Do you have prescription coverage? Yes     Do you have any problems affording any of your prescribed medications? No     Is the patient taking medications as prescribed? yes     How do you get to doctors appointments? family or friend will provide     Are you on dialysis? No     Do you take coumadin? No     Discharge Plan A Home with family     Discharge Plan B Home Health;Home with family     DME Needed Upon Discharge  none     Discharge Plan discussed with: Patient     Discharge Barriers Identified None                   Patient lives with spouse and 24 y/o daughter in a single story home with three entry steps. Patient does not feel she will need any post acute services upon hospital discharge. Patient spouse and daughter are primary caregivers and will provide assistance with care once patient home. Patient spouse will also provide transportation home.

## 2023-04-17 NOTE — ASSESSMENT & PLAN NOTE
Mr. Riley is a 57 yo F with PMHx ureteral strictures 2/2 XRT for cervical cancer complicated by recurrent UTIs who presents to the ED complaining of R sided flank pain. She is scheduled for R PCNL , R antegrade URS, stone basket extraction planned for mid ureteral stone on 4/21 with urology. ID consulted for abx recommendations as with hx of MDRO UTIs. CT A/P with moderate to severe hydronephrosis and new small focus of gas within right renal collecting system. Left sided nephrostomy tube without hydronephrosis. Bilateral urothelial enhancement and periureteral inflammatory change may relate to ascending urinary tract infectious process    Recommendations  1. Continue vancomycin as prior urine cultures 4/11 +E.faecium   2. Continue ertapenem for esbl klebsiella   3. Per patient urine cultures collected from bag, would avoid sending urine specimen from bag as likely will be polymicrobial. Recommend repeat urine cultures from nephrostomy tube   4. ID will follow closely with you

## 2023-04-17 NOTE — ED PROVIDER NOTES
Encounter Date: 4/16/2023       History     Chief Complaint   Patient presents with    Flank Pain     Pt c/o flank pain.  States has kidney stones.  Pt has bilateral nephrostomy tubes in place.      59 yo female with multiple comorbidities including HTN, DVT, CKD, cervical cancer s/p pelvic radiation c/b bilateral ureteral strictures and bilateral hydronephrosis requiring urinary diversion 9/2020, osteoarthritis presents for sharp, stabbing right-sided flank pain which started this morning.  She took some Tylenol without significant improvement.  Reports associated subjective fever.  She denies nausea/vomiting, hematuria or abdominal pain. Notably, she is scheduled to have a nephrolithotomy performed 04/21/2023 with Dr. Russ and was recommended to be pre admitted for antibiotic treatment of MDR UTI prior to this.    Review of patient's allergies indicates:   Allergen Reactions    Bee sting [allergen ext-venom-honey bee]      Rash      Grass pollen-bermuda, standard      rash     Past Medical History:   Diagnosis Date    Abnormal mammogram 08/25/2020    Acute blood loss anemia     Acute deep vein thrombosis (DVT) of lower extremity 12/09/2020    Advance care planning 04/30/2021    Anemia due to chronic blood loss     Anemia due to chronic kidney disease     Anxiety     Bilateral ureteral obstruction 9/11/2020    Cardiovascular event risk -- low 09/14/2015    ASCVD 10-year risk 1.9% (with optimal risk factors 1.3%) as of 9/14/2015     Cervical cancer 2014    Chronic back pain     Colostomy care     Deep vein thrombosis     Depression     Diarrhea due to malabsorption 11/14/2018    Diarrhea due to malabsorption 11/14/2018    Difficult intubation     Disorder of kidney and ureter     DVT of lower extremity, bilateral 11/04/2020    Emphysema of lung 4/10/2023    Fibromyalgia     Fungemia 09/27/2020    Generalized abdominal pain 08/25/2020    GERD (gastroesophageal reflux disease)     Hemifacial spasm 09/16/2015     Hiatal hernia 2014    History of cervical cancer 10/11/2018    Hx of psychiatric care     Cymbalta, trazodone    Hypertension     Hypomagnesemia 11/21/2018    Lactose intolerance     Metastatic squamous cell carcinoma to lymph node 10/11/2018    Nephrostomy tube displaced     Neuropathy due to chemotherapeutic drug 11/14/2018    Osteoarthritis of back     Peritonitis 09/22/2020    Pseudomonas urinary tract infection 04/21/2021    Psychiatric problem     Refusal of blood transfusions as patient is [employer]     Schatzki's ring 09/14/2015    Seen on outside EGD 05/2014, underwent esophageal dilatation. Bx were negative.     Seizures     Sleep stage dysfunction     Noted on PSG 06/2017; negative for obstructive sleep apnea     Stroke     Urinary tract infection associated with nephrostomy catheter 06/11/2020    Wound infection after surgery 09/24/2020     Past Surgical History:   Procedure Laterality Date    ANTEGRADE NEPHROSTOGRAPHY Bilateral 9/28/2020    Procedure: Nephrostogram - antegrade;  Surgeon: Leslie Balbuena MD;  Location: [facility] OR [address] [place] [address];  Service: Urology;  Laterality: Bilateral;    ANTEGRADE NEPHROSTOGRAPHY Left 4/20/2021    Procedure: NEPHROSTOGRAM, ANTEGRADE;  Surgeon: Leslie Balbuena MD;  [facility];  Service: Urology;  Laterality: Left;    ANTEGRADE NEPHROSTOGRAPHY Right 10/27/2022    Procedure: NEPHROSTOGRAM, ANTEGRADE;  Surgeon: Chirag Russ MD;  [facility];  Service: Urology;  Laterality: Right;    BILATERAL OOPHORECTOMY  2015    CHOLECYSTECTOMY  11/09/2016    Done at [facility], path showed chronic cholecystitis and gallstones    cold knife conization  2014    COLECTOMY, RIGHT  9/17/2020    Procedure: COLECTOMY, RIGHT Extended;  Surgeon: Hammad Reynolds MD;  Location: [facility] OR 2ND FLR;  Service: Colon and Rectal;;    COLONOSCOPY  2014    COLONOSCOPY N/A 10/24/2016    at [facility], Dr Thomas    COLONOSCOPY N/A 5/18/2018    Procedure: COLONOSCOPY;  Surgeon:  Arden Gutiérrez MD;  Location: [facility] (4TH FLR);  Service: Endoscopy;  Laterality: N/A;    COLONOSCOPY N/A 7/28/2020    Procedure: COLONOSCOPY;  Surgeon: Hammad Reynolds MD;  Location: [facility] (4TH FLR);  Service: Colon and Rectal;  Laterality: N/A;  covid test [place] 7/25    CYSTOSCOPY WITH URETEROSCOPY, RETROGRADE PYELOGRAPHY, AND INSERTION OF STENT Bilateral 3/21/2020    Procedure: CYSTOSCOPY, WITH RETROGRADE PYELOGRAM,;  Surgeon: Leslie Balbuena MD;  Location: [facility] OR 1ST FLR;  Service: Urology;  Laterality: Bilateral;    DILATION OF NEPHROSTOMY TRACT Right 10/27/2022    Procedure: DILATION, NEPHROSTOMY TRACT;  Surgeon: Chirag Russ MD;  Location: [facility] OR 1ST FLR;  Service: Urology;  Laterality: Right;    ESOPHAGOGASTRODUODENOSCOPY  2014    ESOPHAGOGASTRODUODENOSCOPY  11/18/2020    ESOPHAGOGASTRODUODENOSCOPY N/A 11/18/2020    Procedure: ESOPHAGOGASTRODUODENOSCOPY (EGD);  Surgeon: Zenon Spencer MD;  Location: [facility];  Service: Endoscopy;  Laterality: N/A;    ESOPHAGOGASTRODUODENOSCOPY N/A 12/11/2020    Procedure: EGD (ESOPHAGOGASTRODUODENOSCOPY);  Surgeon: Juancho Muse MD;  Location: [facility] (2ND FLR);  Service: Endoscopy;  Laterality: N/A;    HYSTERECTOMY  2014    [facility] for cervical cancer    ILEOSTOMY  9/17/2020    Procedure: CREATION, ILEOSTOMY;  Surgeon: Hammad Reynolds MD;  Location: [facility] OR 2ND FLR;  Service: Colon and Rectal;;    LOOPOGRAM N/A 4/20/2021    Procedure: LOOPOGRAM;  Surgeon: Leslie Balbuena MD;  Location: [facility] OR 1ST FLR;  Service: Urology;  Laterality: N/A;    MOBILIZATION OF SPLENIC FLEXURE N/A 9/11/2020    Procedure: MOBILIZATION, COLONIC;  Surgeon: Hammad Reynolds MD;  Location: [facility] OR 2ND FLR;  Service: Colon and Rectal;  Laterality: N/A;    NEPHROSTOGRAPHY Bilateral 4/17/2021    Procedure: Nephrostogram;  Surgeon: Celeste Surgeon;  Location: [facility];  Service: Anesthesiology;  Laterality: Bilateral;    OOPHORECTOMY      PYELOSCOPY Right 10/27/2022     Procedure: PYELOSCOPY;  Surgeon: Chirag Russ MD;  Location: [facility] OR 1ST FLR;  Service: Urology;  Laterality: Right;    REPLACEMENT OF NEPHROSTOMY TUBE Right 10/27/2022    Procedure: REPLACEMENT, NEPHROSTOMY TUBE;  Surgeon: Chirag Russ MD;  Location: [facility] OR 1ST FLR;  Service: Urology;  Laterality: Right;    RETROPERITONEAL LYMPHADENECTOMY Right 9/17/2018    Procedure: LYMPHADENECTOMY, RETROPERITONEUM;  Surgeon: Sebas Reed MD;  Location: [facility] OR 2ND FLR;  Service: General;  Laterality: Right;  ILIAC    URETEROSCOPIC REMOVAL OF URETERIC CALCULUS Right 10/27/2022    Procedure: REMOVAL, CALCULUS, URETER, URETEROSCOPIC;  Surgeon: Chirag Russ MD;  Location: [facility] OR 1ST FLR;  Service: Urology;  Laterality: Right;    URETEROSCOPY Right 10/27/2022    Procedure: URETEROSCOPY-ANTEGRADE;  Surgeon: Chirag Russ MD;  [facility];  Service: Urology;  Laterality: Right;     Family History   Problem Relation Age of Onset    Heart disease Sister     Heart disease Maternal Grandmother     Colon cancer Father     Esophageal cancer Father     Cancer Father         Lung-smoker    Cancer Mother         Cervical    Cervical cancer Mother     Breast cancer Maternal Aunt     Suicide Daughter         jumped from parking structure    Drug abuse Daughter     Drug abuse Daughter     Rectal cancer Neg Hx     Stomach cancer Neg Hx     Crohn's disease Neg Hx     Ulcerative colitis Neg Hx     Diabetes Neg Hx     Hypertension Neg Hx      Social History     Tobacco Use    Smoking status: Never    Smokeless tobacco: Never   Substance Use Topics    Alcohol use: No     Alcohol/week: 0.0 standard drinks    Drug use: No     Review of Systems   Constitutional:  Positive for chills and fever. Negative for fatigue.   Respiratory:  Negative for shortness of breath.    Cardiovascular:  Negative for chest pain.   Gastrointestinal:  Negative for abdominal pain.   Genitourinary:  Positive for flank pain.    Allergic/Immunologic: Negative for immunocompromised state.     Physical Exam     Initial Vitals [04/16/23 1427]   BP Pulse Resp Temp SpO2   109/62 79 20 98.7 °F (37.1 °C) 100 %      MAP       --         Physical Exam    Nursing note and vitals reviewed.  Constitutional: She appears well-developed and well-nourished. She is not diaphoretic. No distress.   HENT:   Head: Normocephalic and atraumatic.   Eyes: EOM are normal. Pupils are equal, round, and reactive to light.   Neck:   Normal range of motion.  Cardiovascular:  Normal rate, regular rhythm, normal heart sounds and intact distal pulses.     Exam reveals no gallop and no friction rub.       No murmur heard.  Pulmonary/Chest: Breath sounds normal. No respiratory distress. She has no wheezes. She has no rhonchi. She has no rales. She exhibits no tenderness.   Abdominal: Abdomen is soft. Bowel sounds are normal. She exhibits no distension and no mass. There is no abdominal tenderness.   Bilateral nephrostomy tubes with clear yellow urine with significant sediment.    There is also a urostomy with clear urine and a colostomy with liquid brown stool   There is right CVA tenderness.  There is left CVA tenderness. There is no rebound and no guarding.   Musculoskeletal:         General: Normal range of motion.      Cervical back: Normal range of motion.     Neurological: She is alert and oriented to person, place, and time.   Skin: Skin is warm and dry.   Psychiatric: She has a normal mood and affect.       ED Course   Procedures  Labs Reviewed   CBC W/ AUTO DIFFERENTIAL - Abnormal; Notable for the following components:       Result Value    MCHC 29.7 (*)     RDW 15.5 (*)     Gran # (ANC) 8.3 (*)     Mono # 1.5 (*)     Lymph % 12.9 (*)     All other components within normal limits   URINALYSIS, REFLEX TO URINE CULTURE - Abnormal; Notable for the following components:    Appearance, UA Hazy (*)     Protein, UA 1+ (*)     Occult Blood UA 1+ (*)     Leukocytes, UA 3+  (*)     All other components within normal limits    Narrative:     Specimen Source->Urine   COMPREHENSIVE METABOLIC PANEL - Abnormal; Notable for the following components:    CO2 18 (*)     Albumin 3.2 (*)     ALT 8 (*)     eGFR 57.5 (*)     All other components within normal limits   URINALYSIS MICROSCOPIC - Abnormal; Notable for the following components:    RBC, UA 38 (*)     WBC, UA 63 (*)     WBC Clumps, UA Moderate (*)     Bacteria Moderate (*)     All other components within normal limits    Narrative:     Specimen Source->Urine   CULTURE, BLOOD   CULTURE, BLOOD   CULTURE, URINE   LACTIC ACID, PLASMA          Imaging Results    None          Medications   sodium chloride 0.9% flush 10 mL (has no administration in time range)   melatonin tablet 6 mg (has no administration in time range)   traMADoL tablet 50 mg (has no administration in time range)   sodium chloride 0.9% flush 10 mL (has no administration in time range)   ondansetron injection 4 mg (has no administration in time range)   prochlorperazine injection Soln 5 mg (has no administration in time range)   acetaminophen tablet 650 mg (has no administration in time range)   naloxone 0.4 mg/mL injection 0.02 mg (has no administration in time range)   dextrose 40 % gel 15,000 mg (has no administration in time range)   dextrose 40 % gel 30,000 mg (has no administration in time range)   enoxaparin injection 40 mg (has no administration in time range)   ertapenem (INVANZ) 1 g in sodium chloride 0.9 % 100 mL IVPB (MB+) (has no administration in time range)   vancomycin 1,500 mg in dextrose 5 % (D5W) 250 mL IVPB (Vial-Mate) (1,500 mg Intravenous Trough Due As Scheduled Before Dose 4/18/23 2030)   gabapentin capsule 200 mg (has no administration in time range)   HYDROcodone-acetaminophen 5-325 mg per tablet 1 tablet (1 tablet Oral Given 4/16/23 1627)   ondansetron injection 4 mg (4 mg Intravenous Given 4/16/23 1628)   lactated ringers bolus 1,000 mL (0 mLs  Intravenous Stopped 4/16/23 1737)   vancomycin 1,500 mg in dextrose 5 % (D5W) 250 mL IVPB (Vial-Mate) (0 mg Intravenous Stopped 4/16/23 2043)   ertapenem (INVANZ) 1 g in sodium chloride 0.9 % 100 mL IVPB (MB+) (0 g Intravenous Stopped 4/16/23 1858)     Medical Decision Making:   History:   Old Medical Records: I decided to obtain old medical records.  Old Records Summarized: records from clinic visits.       <> Summary of Records: Patient seen infectious disease clinic 04/11/2023 for preoperative antibiotic evaluation.  Unfortunately, she has history of multiple MDR UTIs and they did not recommend any oral antibiotics  Initial Assessment:   58-year-old female presenting for flank pain.  Her vitals are normal, she appears uncomfortable but nontoxic  Differential Diagnosis:   Complicated pyelonephritis  Pain may also be due to known nephrolithiasis  She does not appear clinically septic  Renal this  Clinical Tests:   Lab Tests: Ordered and Reviewed  Radiological Study: Reviewed  ED Management:  Will check labs, give ertapenem and vancomycin based on most recent cultures and discuss with urology.    Case discussed with ED pharmacist, urology.  Patient will be admitted to Hospital Medicine for further management.  Patient comfortable with admission.  Other:   I have discussed this case with another health care provider.           ED Course as of 04/16/23 2240   Sun Apr 16, 2023   1704 BP(!): 89/52  Mildly hypotensive.  Patient reports likely low BP.  Will continue to hydrate, give empiric antibiotic [CC]   1704 WBC: 11.54 [CC]   1711 Lactate, Nicko: 1.4 [CC]   1748 I discussed antibiotic treatment with ED pharmacist who recommends covering for ESBL with ertapenem in addition to vancomycin [CC]   1757 Case discussed with Urology who will discuss with staff.  Anticipate admission to Medicine with Urology following [CC]   1822 WBC Clumps, UA(!): Moderate [CC]   1909 Discussed again with Urology resident Dr. Darnell who  recommends admission to Medicine with infectious disease consult. [CC]      ED Course User Index  [CC] Rajani Moise PA-C                 Clinical Impression:   Final diagnoses:  [R10.9] Right flank pain (Primary)  [N39.0] Complicated UTI (urinary tract infection)        ED Disposition Condition    Observation Stable                Rajani Moise PA-C  04/16/23 2332

## 2023-04-17 NOTE — SUBJECTIVE & OBJECTIVE
Past Medical History:   Diagnosis Date    Abnormal mammogram 08/25/2020    Acute blood loss anemia     Acute deep vein thrombosis (DVT) of lower extremity 12/09/2020    Advance care planning 04/30/2021    Anemia due to chronic blood loss     Anemia due to chronic kidney disease     Anxiety     Bilateral ureteral obstruction 9/11/2020    Cardiovascular event risk -- low 09/14/2015    ASCVD 10-year risk 1.9% (with optimal risk factors 1.3%) as of 9/14/2015     Cervical cancer 2014    Chronic back pain     Colostomy care     Deep vein thrombosis     Depression     Diarrhea due to malabsorption 11/14/2018    Diarrhea due to malabsorption 11/14/2018    Difficult intubation     Disorder of kidney and ureter     DVT of lower extremity, bilateral 11/04/2020    Emphysema of lung 4/10/2023    Fibromyalgia     Fungemia 09/27/2020    Generalized abdominal pain 08/25/2020    GERD (gastroesophageal reflux disease)     Hemifacial spasm 09/16/2015    Hiatal hernia 2014    History of cervical cancer 10/11/2018    Hx of psychiatric care     Cymbalta, trazodone    Hypertension     Hypomagnesemia 11/21/2018    Lactose intolerance     Metastatic squamous cell carcinoma to lymph node 10/11/2018    Nephrostomy tube displaced     Neuropathy due to chemotherapeutic drug 11/14/2018    Osteoarthritis of back     Peritonitis 09/22/2020    Pseudomonas urinary tract infection 04/21/2021    Psychiatric problem     Refusal of blood transfusions as patient is Yarsanism     Ranjit's annette 09/14/2015    Seen on outside EGD 05/2014, underwent esophageal dilatation. Bx were negative.     Seizures     Sleep stage dysfunction     Noted on PSG 06/2017; negative for obstructive sleep apnea     Stroke     Urinary tract infection associated with nephrostomy catheter 06/11/2020    Wound infection after surgery 09/24/2020       Past Surgical History:   Procedure Laterality Date    ANTEGRADE NEPHROSTOGRAPHY Bilateral 9/28/2020    Procedure:  Nephrostogram - antegrade;  Surgeon: Leslie Balbuena MD;  Location: [facility] OR 1ST FLR;  Service: Urology;  Laterality: Bilateral;    ANTEGRADE NEPHROSTOGRAPHY Left 4/20/2021    Procedure: NEPHROSTOGRAM, ANTEGRADE;  Surgeon: Leslie Balbuena MD;  Location: [facility] OR 1ST FLR;  Service: Urology;  Laterality: Left;    ANTEGRADE NEPHROSTOGRAPHY Right 10/27/2022    Procedure: NEPHROSTOGRAM, ANTEGRADE;  Surgeon: Chirag Russ MD;  [facility];  Service: Urology;  Laterality: Right;    BILATERAL OOPHORECTOMY  2015    CHOLECYSTECTOMY  11/09/2016    Done at [facility], path showed chronic cholecystitis and gallstones    cold knife conization  2014    COLECTOMY, RIGHT  9/17/2020    Procedure: COLECTOMY, RIGHT Extended;  Surgeon: Hammad Reynolds MD;  Location: [facility] OR 2ND FLR;  Service: Colon and Rectal;;    COLONOSCOPY  2014    COLONOSCOPY N/A 10/24/2016    at [facility], Dr Gutiérrez    COLONOSCOPY N/A 5/18/2018    Procedure: COLONOSCOPY;  Surgeon: Arden Gutiérrez MD;  Location: [facility] (4TH FLR);  Service: Endoscopy;  Laterality: N/A;    COLONOSCOPY N/A 7/28/2020    Procedure: COLONOSCOPY;  Surgeon: Hammad Reynolds MD;  Location: [facility] ENDO (4TH FLR);  Service: Colon and Rectal;  Laterality: N/A;  covid test [place] 7/25    CYSTOSCOPY WITH URETEROSCOPY, RETROGRADE PYELOGRAPHY, AND INSERTION OF STENT Bilateral 3/21/2020    Procedure: CYSTOSCOPY, WITH RETROGRADE PYELOGRAM,;  Surgeon: Leslie Balbuena MD;  [facility];  Service: Urology;  Laterality: Bilateral;    DILATION OF NEPHROSTOMY TRACT Right 10/27/2022    Procedure: DILATION, NEPHROSTOMY TRACT;  Surgeon: Chirag Russ MD;  [facility];  Service: Urology;  Laterality: Right;    ESOPHAGOGASTRODUODENOSCOPY  2014    ESOPHAGOGASTRODUODENOSCOPY  11/18/2020    ESOPHAGOGASTRODUODENOSCOPY N/A 11/18/2020    Procedure: ESOPHAGOGASTRODUODENOSCOPY (EGD);  Surgeon: Zenon Spencer MD;  Location: [facility];  Service: Endoscopy;   Laterality: N/A;    ESOPHAGOGASTRODUODENOSCOPY N/A 12/11/2020    Procedure: EGD (ESOPHAGOGASTRODUODENOSCOPY);  Surgeon: Juancho Muse MD;  Location: [facility] ENDO (2ND FLR);  Service: Endoscopy;  Laterality: N/A;    HYSTERECTOMY  2014    [facility] for cervical cancer    ILEOSTOMY  9/17/2020    Procedure: CREATION, ILEOSTOMY;  Surgeon: Hammad Reynolds MD;  Location: [facility] OR 2ND FLR;  Service: Colon and Rectal;;    LOOPOGRAM N/A 4/20/2021    Procedure: LOOPOGRAM;  Surgeon: Leslie Balbuena MD;  Location: [facility] OR 1ST FLR;  Service: Urology;  Laterality: N/A;    MOBILIZATION OF SPLENIC FLEXURE N/A 9/11/2020    Procedure: MOBILIZATION, COLONIC;  Surgeon: Hammad Reynolds MD;  Location: [facility] OR 2ND FLR;  Service: Colon and Rectal;  Laterality: N/A;    NEPHROSTOGRAPHY Bilateral 4/17/2021    Procedure: Nephrostogram;  Surgeon: Celeste Surgeon;  Location: [facility];  Service: Anesthesiology;  Laterality: Bilateral;    OOPHORECTOMY      PYELOSCOPY Right 10/27/2022    Procedure: PYELOSCOPY;  Surgeon: Chirag Russ MD;  [facility];  Service: Urology;  Laterality: Right;    REPLACEMENT OF NEPHROSTOMY TUBE Right 10/27/2022    Procedure: REPLACEMENT, NEPHROSTOMY TUBE;  Surgeon: Chirag Russ MD;  [facility];  Service: Urology;  Laterality: Right;    RETROPERITONEAL LYMPHADENECTOMY Right 9/17/2018    Procedure: LYMPHADENECTOMY, RETROPERITONEUM;  Surgeon: Sebas Reed MD;  Location: [facility] OR 2ND FLR;  Service: General;  Laterality: Right;  ILIAC    URETEROSCOPIC REMOVAL OF URETERIC CALCULUS Right 10/27/2022    Procedure: REMOVAL, CALCULUS, URETER, URETEROSCOPIC;  Surgeon: Chirag Russ MD;  [facility];  Service: Urology;  Laterality: Right;    URETEROSCOPY Right 10/27/2022    Procedure: URETEROSCOPY-ANTEGRADE;  Surgeon: Chirag Russ MD;  Location: [facility] OR [facility] FLR;  Service: Urology;  Laterality: Right;       Review of patient's allergies indicates:   Allergen Reactions    Bee  sting [allergen ext-venom-honey bee]      Rash      Grass pollen-bermuda, standard      rash       Family History       Problem Relation (Age of Onset)    Breast cancer Maternal Aunt    Cancer Father, Mother    Cervical cancer Mother    Colon cancer Father    Drug abuse Daughter, Daughter    Esophageal cancer Father    Heart disease Sister, Maternal Grandmother    Suicide Daughter            Tobacco Use    Smoking status: Never    Smokeless tobacco: Never   Substance and Sexual Activity    Alcohol use: No     Alcohol/week: 0.0 standard drinks    Drug use: No    Sexual activity: Yes     Partners: Male     Birth control/protection: None     Comment:  19 years since 1999       Review of Systems   Constitutional:  Negative for chills and fever.   HENT:  Negative for trouble swallowing.    Respiratory:  Negative for cough and shortness of breath.    Cardiovascular:  Negative for chest pain.   Gastrointestinal:  Negative for abdominal distention, abdominal pain and vomiting.   Genitourinary:  Positive for flank pain. Negative for decreased urine volume and dysuria.        R flank pain    Musculoskeletal:  Negative for back pain.   Skin:  Negative for pallor.   Neurological:  Negative for weakness and numbness.   Psychiatric/Behavioral:  Negative for agitation.      Objective:     Temp:  [97.8 °F (36.6 °C)-98.7 °F (37.1 °C)] 97.8 °F (36.6 °C)  Pulse:  [61-79] 65  Resp:  [14-20] 14  SpO2:  [98 %-100 %] 98 %  BP: ()/(52-62) 114/53     Body mass index is 29.17 kg/m².           Drains       Drain  Duration                  Urostomy 09/11/20 0000 ileal conduit  days         Ileostomy 09/18/20  days         Nephrostomy 02/14/23 Right 62 days         Nephrostomy 03/28/23 0953 Left 12 Fr. 19 days         Nephrostomy 03/28/23 0953 Right 12 Fr. 19 days                    Physical Exam  Constitutional:       Appearance: She is not toxic-appearing.   HENT:      Head: Normocephalic.   Cardiovascular:       Rate and Rhythm: Normal rate.   Pulmonary:      Effort: Pulmonary effort is normal. No respiratory distress.   Abdominal:      General: Abdomen is flat. There is no distension.      Palpations: Abdomen is soft.      Comments: Urostomy draining c/y/u.  R NT flushed with 10 cc sterile water, cloudy urine return with sediment.  L NT draining minimal light red thin urine.     Musculoskeletal:         General: No swelling or deformity. Normal range of motion.      Cervical back: Normal range of motion.   Skin:     General: Skin is warm and dry.      Findings: No erythema.   Neurological:      General: No focal deficit present.      Mental Status: She is alert.      Motor: No weakness.   Psychiatric:         Mood and Affect: Mood normal.         Behavior: Behavior normal.       Significant Labs:    BMP:  Recent Labs   Lab 04/10/23  1516 04/16/23  1741 04/17/23  0357    140 137   K 4.3 4.4 3.9    110 110   CO2 20* 18* 18*   BUN 30* 16 14   CREATININE 1.3 1.1 1.1   CALCIUM 9.8 9.3 9.4       CBC:  Recent Labs   Lab 04/16/23  1621 04/17/23  0357   WBC 11.54 8.21   HGB 13.1 11.7*   HCT 44.1 39.0    231       Blood Culture:   Recent Labs   Lab 04/16/23  1602 04/16/23  1621   LABBLOO No Growth to date No Growth to date     Urine Studies:   Recent Labs   Lab 04/16/23 1746   COLORU Yellow   APPEARANCEUA Hazy*   PHUR 6.0   SPECGRAV 1.015   PROTEINUA 1+*   GLUCUA Negative   KETONESU Negative   BILIRUBINUA Negative   OCCULTUA 1+*   NITRITE Negative   LEUKOCYTESUR 3+*   RBCUA 38*   WBCUA 63*   BACTERIA Moderate*   HYALINECASTS 0     All pertinent labs results from the past 24 hours have been reviewed.  Recent Lab Results  (Last 5 results in the past 24 hours)        04/17/23  0357   04/16/23  1746   04/16/23  1741   04/16/23  1621   04/16/23  1602        Albumin 3.1     3.2           Alkaline Phosphatase 120     115           ALT 12     8           Anion Gap 9     12           Appearance, UA   Hazy             AST  19     15           Bacteria, UA   Moderate             Baso # 0.04       0.05         Basophil % 0.5       0.4         Bilirubin (UA)   Negative             BILIRUBIN TOTAL 0.4  Comment: For infants and newborns, interpretation of results should be based  on gestational age, weight and in agreement with clinical  observations.    Premature Infant recommended reference ranges:  Up to 24 hours.............<8.0 mg/dL  Up to 48 hours............<12.0 mg/dL  3-5 days..................<15.0 mg/dL  6-29 days.................<15.0 mg/dL       0.3  Comment: For infants and newborns, interpretation of results should be based  on gestational age, weight and in agreement with clinical  observations.    Premature Infant recommended reference ranges:  Up to 24 hours.............<8.0 mg/dL  Up to 48 hours............<12.0 mg/dL  3-5 days..................<15.0 mg/dL  6-29 days.................<15.0 mg/dL             Blood Culture, Routine       No Growth to date  [P]   No Growth to date  [P]       BUN 14     16           Calcium 9.4     9.3           Chloride 110     110           CO2 18     18           Color, UA   Yellow             Creatinine 1.1     1.1           Differential Method Automated       Automated         eGFR 57.5     57.5           Eos # 0.2       0.2         Eosinophil % 2.7       1.6         Glucose 76     83           Glucose, UA   Negative             Gran # (ANC) 5.2       8.3         Gran % 63.2       72.2         Hematocrit 39.0       44.1         Hemoglobin 11.7       13.1         Hyaline Casts, UA   0             Immature Grans (Abs) 0.02  Comment: Mild elevation in immature granulocytes is non specific and   can be seen in a variety of conditions including stress response,   acute inflammation, trauma and pregnancy. Correlation with other   laboratory and clinical findings is essential.         0.02  Comment: Mild elevation in immature granulocytes is non specific and   can be seen in a variety of  conditions including stress response,   acute inflammation, trauma and pregnancy. Correlation with other   laboratory and clinical findings is essential.           Immature Granulocytes 0.2       0.2         Ketones, UA   Negative             Lactate, Nicko       1.4  Comment: Falsely low lactic acid results can be found in samples   containing >=13.0 mg/dL total bilirubin and/or >=3.5 mg/dL   direct bilirubin.           Leukocytes, UA   3+             Lymph # 1.6       1.5         Lymph % 19.5       12.9         MCH 27.4       27.4         MCHC 30.0       29.7         MCV 91       92         Microscopic Comment   SEE COMMENT  Comment: Other formed elements not mentioned in the report are not   present in the microscopic examination.                Mono # 1.1       1.5         Mono % 13.9       12.7         MPV 10.3       10.8         NITRITE UA   Negative             nRBC 0       0         Occult Blood UA   1+             pH, UA   6.0             Platelets 231       267         Potassium 3.9     4.4           PROTEIN TOTAL 6.7     6.6           Protein, UA   1+  Comment: Recommend a 24 hour urine protein or a urine   protein/creatinine ratio if globulin induced proteinuria is  clinically suspected.               RBC 4.27       4.78         RBC, UA   38             RDW 15.6       15.5         Sodium 137     140           Specific [place], UA   1.015             Specimen UA   Urine, Unspecified             WBC Clumps, UA   Moderate             WBC, UA   63             WBC 8.21       11.54         Yeast, UA   MODERATE                                     [P] - Preliminary Result               Significant Imaging:  All pertinent imaging results/findings from the past 24 hours have been reviewed.

## 2023-04-17 NOTE — PROGRESS NOTES
Pharmacokinetic Initial Assessment & Plan: IV Vancomycin    IV Vancomycin 1500 mg x once given in the ED on 04/16 @ 1913  Plan to continue Vancomycin 1500 mg every 24 hours   Obtain a Vancomycin trough 30 mins prior to the 3rd dose on 04/18 @ 2030  Desired empiric serum trough concentration is 10 to 15 mcg/mL    Pharmacy will continue to follow and monitor vancomycin. [id number] with any questions regarding this assessment.     Thank you for the consult,   Marcy Coronel       Patient brief summary:  Edita Riley is a 58 y.o. female initiated on antimicrobial therapy with IV Vancomycin for treatment of suspected urinary tract infection    Drug Allergies:   Review of patient's allergies indicates:   Allergen Reactions    Bee sting [allergen ext-venom-honey bee]      Rash      Grass pollen-bermuda, standard      rash       Actual Body Weight:   83.9 kg    Renal Function:   Estimated Creatinine Clearance: 62.9 mL/min (based on SCr of 1.1 mg/dL).,     CBC (last 72 hours):  Recent Labs   Lab Result Units 04/16/23  1621   WBC K/uL 11.54   Hemoglobin g/dL 13.1   Hematocrit % 44.1   Platelets K/uL 267   Gran % % 72.2   Lymph % % 12.9*   Mono % % 12.7   Eosinophil % % 1.6   Basophil % % 0.4   Differential Method  Automated       Metabolic Panel (last 72 hours):  Recent Labs   Lab Result Units 04/16/23  1741 04/16/23  1746   Sodium mmol/L 140  --    Potassium mmol/L 4.4  --    Chloride mmol/L 110  --    CO2 mmol/L 18*  --    Glucose mg/dL 83  --    Glucose, UA   --  Negative   BUN mg/dL 16  --    Creatinine mg/dL 1.1  --    Albumin g/dL 3.2*  --    Total Bilirubin mg/dL 0.3  --    Alkaline Phosphatase U/L 115  --    AST U/L 15  --    ALT U/L 8*  --        Drug levels (last 3 results):  No results for input(s): VANCOMYCINRA, VANCORANDOM, VANCOMYCINPE, VANCOPEAK, VANCOMYCINTR, VANCOTROUGH in the last 72 hours.    Microbiologic Results:  Microbiology Results (last 7 days)       Procedure Component Value Units Date/Time     Urine culture [[id number]] Collected: 04/16/23 1746    Order Status: No result Specimen: Urine Updated: 04/16/23 1811    Blood culture #1 **CANNOT BE ORDERED STAT** [[id number]] Collected: 04/16/23 1621    Order Status: Sent Specimen: Blood from Peripheral, Forearm, Right Updated: 04/16/23 1630    Blood culture #2 **CANNOT BE ORDERED STAT** [[id number]] Collected: 04/16/23 1602    Order Status: Sent Specimen: Blood from Peripheral, Forearm, Right Updated: 04/16/23 1630

## 2023-04-17 NOTE — ASSESSMENT & PLAN NOTE
-Pt. transverse colon conduit 2020 complicated by bowel perforation requiring hemicolectomy and ileostomy. No acute issues, ostomy bag in place

## 2023-04-17 NOTE — CONSULTS
[facility]  Infectious Disease  Consult Note    Patient Name: Edita Riley  MRN: [id number]  Admission Date: 4/16/2023  Hospital Length of Stay: 0 days  Attending Physician: Wally Davenport MD  Primary Care Provider: Primary Doctor No     Isolation Status: Contact      Inpatient consult to Infectious Diseases  Consult performed by: Elizabeth Ron PA-C  Consult ordered by: Rajani Moise PA-C        Assessment/Plan:     Renal/  * Urinary tract infection associated with nephrostomy catheter  Mr. Riley is a 59 yo F with PMHx ureteral strictures 2/2 XRT for cervical cancer complicated by recurrent UTIs who presents to the ED complaining of R sided flank pain. She is scheduled for R PCNL , R antegrade URS, stone basket extraction planned for mid ureteral stone on 4/21 with urology. ID consulted for abx recommendations as with hx of MDRO UTIs. CT A/P with moderate to severe hydronephrosis and new small focus of gas within right renal collecting system. Left sided nephrostomy tube without hydronephrosis. Bilateral urothelial enhancement and periureteral inflammatory change may relate to ascending urinary tract infectious process    Recommendations  1. Continue vancomycin as prior urine cultures 4/11 +E.faecium   2. Continue ertapenem for esbl klebsiella   3. Per patient urine cultures collected from bag, would avoid sending urine specimen from bag as likely will be polymicrobial. Recommend repeat urine cultures from nephrostomy tube   4. ID will follow closely with you         Thank you for your consult. I will follow-up with patient. Please contact us if you have any additional questions.    Elizabeth Ron PA-C  Infectious Disease  [facility]    Subjective:     Principal Problem: Urinary tract infection associated with nephrostomy catheter    HPI: 59 yo F with PMHx distal ureteral strictures due to XRT for cervical cancer s/p transverse colon conduit and B/L nephrostomy tubes  complicated by MDR infections who presents to the ED complaining of R sided flank pain since today. Pt. has been seeing Urology for strictures, hydronephrosis, and nephrolithiasis. She was recently noted to have worsening R sided hydronephrosis with imaging revealing Right nephrostomy tube appears to be bypassing the renal parenchyma and directly entering the renal pelvis as well as a 4 mm R mid ureteral stone. She was to be direct admitted tomorrow  for R PCNL, R antegrade URS, stone basket extraction and with pre-op antibiotics. Pt. Reports that she developed severe R sided flank pain today, however, so she came to the ED tonight. She denies any fevers, chills, weakness, or changes in urine output. She just reports that her flank pain worsened to the point that she thought her kidneys were worsening, so she came to the hospital today.      Past Medical History:   Diagnosis Date    Abnormal mammogram 08/25/2020    Acute blood loss anemia     Acute deep vein thrombosis (DVT) of lower extremity 12/09/2020    Advance care planning 04/30/2021    Anemia due to chronic blood loss     Anemia due to chronic kidney disease     Anxiety     Bilateral ureteral obstruction 9/11/2020    Cardiovascular event risk -- low 09/14/2015    ASCVD 10-year risk 1.9% (with optimal risk factors 1.3%) as of 9/14/2015     Cervical cancer 2014    Chronic back pain     Colostomy care     Deep vein thrombosis     Depression     Diarrhea due to malabsorption 11/14/2018    Diarrhea due to malabsorption 11/14/2018    Difficult intubation     Disorder of kidney and ureter     DVT of lower extremity, bilateral 11/04/2020    Emphysema of lung 4/10/2023    Fibromyalgia     Fungemia 09/27/2020    Generalized abdominal pain 08/25/2020    GERD (gastroesophageal reflux disease)     Hemifacial spasm 09/16/2015    Hiatal hernia 2014    History of cervical cancer 10/11/2018    Hx of psychiatric care     Cymbalta, trazodone     Hypertension     Hypomagnesemia 11/21/2018    Lactose intolerance     Metastatic squamous cell carcinoma to lymph node 10/11/2018    Nephrostomy tube displaced     Neuropathy due to chemotherapeutic drug 11/14/2018    Osteoarthritis of back     Peritonitis 09/22/2020    Pseudomonas urinary tract infection 04/21/2021    Psychiatric problem     Refusal of blood transfusions as patient is [employer]     Schatzki's ring 09/14/2015    Seen on outside EGD 05/2014, underwent esophageal dilatation. Bx were negative.     Seizures     Sleep stage dysfunction     Noted on PSG 06/2017; negative for obstructive sleep apnea     Stroke     Urinary tract infection associated with nephrostomy catheter 06/11/2020    Wound infection after surgery 09/24/2020       Past Surgical History:   Procedure Laterality Date    ANTEGRADE NEPHROSTOGRAPHY Bilateral 9/28/2020    Procedure: Nephrostogram - antegrade;  Surgeon: Leslie Balbuena MD;  Location: [facility] OR 1ST FLR;  Service: Urology;  Laterality: Bilateral;    ANTEGRADE NEPHROSTOGRAPHY Left 4/20/2021    Procedure: NEPHROSTOGRAM, ANTEGRADE;  Surgeon: Leslie Balbuena MD;  Location: [facility] OR 1ST FLR;  Service: Urology;  Laterality: Left;    ANTEGRADE NEPHROSTOGRAPHY Right 10/27/2022    Procedure: NEPHROSTOGRAM, ANTEGRADE;  Surgeon: Chirag uRss MD;  Location: [facility] OR 1ST FLR;  Service: Urology;  Laterality: Right;    BILATERAL OOPHORECTOMY  2015    CHOLECYSTECTOMY  11/09/2016    Done at [facility], path showed chronic cholecystitis and gallstones    cold knife conization  2014    COLECTOMY, RIGHT  9/17/2020    Procedure: COLECTOMY, RIGHT Extended;  Surgeon: Hammad Reynolds MD;  Location: [facility] OR 2ND FLR;  Service: Colon and Rectal;;    COLONOSCOPY  2014    COLONOSCOPY N/A 10/24/2016    at [facility], Dr Gutiérrez    COLONOSCOPY N/A 5/18/2018    Procedure: COLONOSCOPY;  Surgeon: Arden Gutiérrez MD;  Location: [facility] (4TH FLR);  Service: Endoscopy;   Laterality: N/A;    COLONOSCOPY N/A 7/28/2020    Procedure: COLONOSCOPY;  Surgeon: Hammad Reynolds MD;  Location: [facility] ENDO (4TH FLR);  Service: Colon and Rectal;  Laterality: N/A;  covid test [place] 7/25    CYSTOSCOPY WITH URETEROSCOPY, RETROGRADE PYELOGRAPHY, AND INSERTION OF STENT Bilateral 3/21/2020    Procedure: CYSTOSCOPY, WITH RETROGRADE PYELOGRAM,;  Surgeon: Leslie Balbuena MD;  Location: [facility] OR 1ST FLR;  Service: Urology;  Laterality: Bilateral;    DILATION OF NEPHROSTOMY TRACT Right 10/27/2022    Procedure: DILATION, NEPHROSTOMY TRACT;  Surgeon: Chirag Russ MD;  Location: [facility] OR 1ST FLR;  Service: Urology;  Laterality: Right;    ESOPHAGOGASTRODUODENOSCOPY  2014    ESOPHAGOGASTRODUODENOSCOPY  11/18/2020    ESOPHAGOGASTRODUODENOSCOPY N/A 11/18/2020    Procedure: ESOPHAGOGASTRODUODENOSCOPY (EGD);  Surgeon: Zenon Spencer MD;  Location: [facility];  Service: Endoscopy;  Laterality: N/A;    ESOPHAGOGASTRODUODENOSCOPY N/A 12/11/2020    Procedure: EGD (ESOPHAGOGASTRODUODENOSCOPY);  Surgeon: Juancho Muse MD;  Location: [facility] ENDO (2ND FLR);  Service: Endoscopy;  Laterality: N/A;    HYSTERECTOMY  2014    [facility] for [facility]    ILEOSTOMY  9/17/2020    Procedure: CREATION, ILEOSTOMY;  Surgeon: Hammad Reynolds MD;  Location: [facility] OR 2ND FLR;  Service: Colon and Rectal;;    LOOPOGRAM N/A 4/20/2021    Procedure: LOOPOGRAM;  Surgeon: Leslie Balbuena MD;  Location: [facility] OR 1ST FLR;  Service: Urology;  Laterality: N/A;    MOBILIZATION OF SPLENIC FLEXURE N/A 9/11/2020    Procedure: MOBILIZATION, COLONIC;  Surgeon: Hammad Reynolds MD;  Location: [facility] OR 2ND FLR;  Service: Colon and Rectal;  Laterality: N/A;    NEPHROSTOGRAPHY Bilateral 4/17/2021    Procedure: Nephrostogram;  Surgeon: Celeste Surgeon;  Location: [facility];  Service: Anesthesiology;  Laterality: Bilateral;    OOPHORECTOMY      PYELOSCOPY Right 10/27/2022    Procedure: PYELOSCOPY;  Surgeon: Chirag Russ MD;  Location:  [facility] OR 1ST FLR;  Service: Urology;  Laterality: Right;    REPLACEMENT OF NEPHROSTOMY TUBE Right 10/27/2022    Procedure: REPLACEMENT, NEPHROSTOMY TUBE;  Surgeon: Chirag Russ MD;  Location: [facility] OR 1ST FLR;  Service: Urology;  Laterality: Right;    RETROPERITONEAL LYMPHADENECTOMY Right 9/17/2018    Procedure: LYMPHADENECTOMY, RETROPERITONEUM;  Surgeon: Sebas Reed MD;  Location: [facility] OR 2ND FLR;  Service: General;  Laterality: Right;  ILIAC    URETEROSCOPIC REMOVAL OF URETERIC CALCULUS Right 10/27/2022    Procedure: REMOVAL, CALCULUS, URETER, URETEROSCOPIC;  Surgeon: Chirag Russ MD;  Location: [facility] OR 1ST FLR;  Service: Urology;  Laterality: Right;    URETEROSCOPY Right 10/27/2022    Procedure: URETEROSCOPY-ANTEGRADE;  Surgeon: Chirag Russ MD;  Location: [facility] OR 1ST FLR;  Service: Urology;  Laterality: Right;       Review of patient's allergies indicates:   Allergen Reactions    Bee sting [allergen ext-venom-honey bee]      Rash      Grass pollen-bermuda, standard      rash       Medications:  Medications Prior to Admission   Medication Sig    gabapentin (NEURONTIN) 100 MG capsule Take 2 capsules (200 mg total) by mouth every evening.    ketorolac (TORADOL) 10 mg tablet Take 1 tablet (10 mg total) by mouth every 6 (six) hours as needed for Pain.    sucralfate (CARAFATE) 1 gram tablet Take 1 tablet (1 g total) by mouth 4 (four) times daily before meals and nightly.    traMADoL (ULTRAM) 50 mg tablet Take 1 tablet (50 mg total) by mouth every 6 (six) hours.    acetaminophen (TYLENOL) 500 MG tablet Take 500 mg by mouth daily as needed for Pain.    albuterol (VENTOLIN HFA) 90 mcg/actuation inhaler Inhale 2 puffs into the lungs every 6 (six) hours as needed for Wheezing or Shortness of Breath. Rescue    melatonin (MELATIN) 3 mg tablet Take 2 tablets (6 mg total) by mouth nightly as needed for Insomnia.    mirtazapine (REMERON SOL-TAB) 15 MG disintegrating tablet Dissolve 1 tablet  (15 mg total) by mouth nightly.    sodium bicarbonate 650 MG tablet Take 2 tablets (1,300 mg total) by mouth 2 (two) times daily.     Antibiotics (From admission, onward)      Start     Stop Route Frequency Ordered    23 2100  vancomycin 1,500 mg in dextrose 5 % (D5W) 250 mL IVPB (Vial-Mate)         -- IV Every 24 hours (non-standard times) 23 1300    23 1400  ertapenem (INVANZ) 1 g in sodium chloride 0.9 % 100 mL IVPB (MB+)         -- IV Every 24 hours (non-standard times) 23 1248    23 1347  vancomycin - pharmacy to dose  (vancomycin IVPB)        See Hyperspace for full Linked Orders Report.    -- IV pharmacy to manage frequency 23 1248          Antifungals (From admission, onward)      None          Antivirals (From admission, onward)      None             Immunization History   Administered Date(s) Administered    Influenza - Quadrivalent - PF *Preferred* (6 months and older) 2017, 2019    Tdap 2017       Family History       Problem Relation (Age of Onset)    Breast cancer Maternal Aunt    Cancer Father, Mother    Cervical cancer Mother    Colon cancer Father    Drug abuse Daughter, Daughter    Esophageal cancer Father    Heart disease Sister, Maternal Grandmother    Suicide Daughter          Social History     Socioeconomic History    Marital status:      Spouse name: Hammad    Number of children: 2   Tobacco Use    Smoking status: Never    Smokeless tobacco: Never   Substance and Sexual Activity    Alcohol use: No     Alcohol/week: 0.0 standard drinks    Drug use: No    Sexual activity: Yes     Partners: Male     Birth control/protection: None     Comment:  19 years since    Social History Narrative    , twin daughters (1  2018), disabled due to childhood stroke, [employer]     Social Determinants of Health     Financial Resource Strain: Low Risk     Difficulty of Paying Living Expenses: Not  very hard   Food Insecurity: No Food Insecurity    Worried About Running Out of Food in the Last Year: Never true    Ran Out of Food in the Last Year: Never true   Transportation Needs: No Transportation Needs    Lack of Transportation (Medical): No    Lack of Transportation (Non-Medical): No   Physical Activity: Inactive    Days of Exercise per Week: 0 days    Minutes of Exercise per Session: 0 min   Stress: No Stress Concern Present    Feeling of Stress : Only a little   Social Connections: Moderately Isolated    Frequency of Communication with Friends and Family: More than three times a week    Frequency of Social Gatherings with Friends and Family: More than three times a week    Attends Christianity Services: Never    Active Member of Clubs or Organizations: No    Attends Club or Organization Meetings: Never    Marital Status:    Housing Stability: Low Risk     Unable to Pay for Housing in the Last Year: No    Number of Places Lived in the Last Year: 1    Unstable Housing in the Last Year: No     Review of Systems   Constitutional:  Positive for fatigue. Negative for activity change, appetite change, diaphoresis and fever.   Respiratory:  Negative for cough and shortness of breath.    Gastrointestinal:  Negative for abdominal pain, constipation, diarrhea, nausea and vomiting.   Genitourinary:  Positive for difficulty urinating, dysuria and flank pain.   All other systems reviewed and are negative.  Objective:     Vital Signs (Most Recent):  Temp: 98.3 °F (36.8 °C) (04/17/23 1152)  Pulse: 67 (04/17/23 1152)  Resp: 18 (04/17/23 1205)  BP: (!) 103/59 (04/17/23 1152)  SpO2: 99 % (04/17/23 1152)   Vital Signs (24h Range):  Temp:  [97.5 °F (36.4 °C)-98.7 °F (37.1 °C)] 98.3 °F (36.8 °C)  Pulse:  [61-79] 67  Resp:  [14-20] 18  SpO2:  [98 %-100 %] 99 %  BP: ()/(52-62) 103/59     Weight: 89.6 kg (197 lb 8.5 oz)  Body mass index is 29.17 kg/m².    Estimated Creatinine Clearance: 64.9 mL/min  (based on SCr of 1.1 mg/dL).    Physical Exam  Vitals and nursing note reviewed.   Constitutional:       General: She is not in acute distress.     Appearance: She is well-developed. She is not diaphoretic.   HENT:      Head: Normocephalic and atraumatic.   Eyes:      Pupils: Pupils are equal, round, and reactive to light.   Cardiovascular:      Rate and Rhythm: Normal rate and regular rhythm.      Heart sounds: Normal heart sounds. No murmur heard.    No friction rub. No gallop.   Pulmonary:      Effort: Pulmonary effort is normal. No respiratory distress.      Breath sounds: Normal breath sounds. No wheezing or rales.   Chest:      Chest wall: No tenderness.   Abdominal:      General: Bowel sounds are normal. There is no distension.      Palpations: Abdomen is soft. There is no mass.      Tenderness: There is no abdominal tenderness. There is no guarding or rebound.      Hernia: No hernia is present.      Comments: Ileal conduit  Colostomy bag c/d/I watery stools   Musculoskeletal:         General: No tenderness or deformity. Normal range of motion.      Cervical back: Normal range of motion and neck supple.   Skin:     General: Skin is warm and dry.      Coloration: Skin is not pale.      Findings: No erythema.   Neurological:      Mental Status: She is alert and oriented to person, place, and time.      Cranial Nerves: No cranial nerve deficit.      Coordination: Coordination normal.   Psychiatric:         Behavior: Behavior normal.         Thought Content: Thought content normal.       Significant Labs: All pertinent labs within the past 24 hours have been reviewed.    Significant Imaging: I have reviewed all pertinent imaging results/findings within the past 24 hours.

## 2023-04-17 NOTE — PLAN OF CARE
Problem: Adult Inpatient Plan of Care  Goal: Plan of Care Review  Outcome: Ongoing, Progressing  Goal: Patient-Specific Goal (Individualized)  Outcome: Ongoing, Progressing  Goal: Absence of Hospital-Acquired Illness or Injury  Outcome: Ongoing, Progressing  Goal: Optimal Comfort and Wellbeing  Outcome: Ongoing, Progressing  Goal: Readiness for Transition of Care  Outcome: Ongoing, Progressing     Problem: Adult Inpatient Plan of Care  Goal: Plan of Care Review  Outcome: Ongoing, Progressing     Problem: Adult Inpatient Plan of Care  Goal: Patient-Specific Goal (Individualized)  Outcome: Ongoing, Progressing     Problem: Adult Inpatient Plan of Care  Goal: Absence of Hospital-Acquired Illness or Injury  Outcome: Ongoing, Progressing     Problem: Adult Inpatient Plan of Care  Goal: Optimal Comfort and Wellbeing  Outcome: Ongoing, Progressing     Problem: Adult Inpatient Plan of Care  Goal: Readiness for Transition of Care  Outcome: Ongoing, Progressing     Explained plan of care, verbalized understanding. Educated pt on new medicine . No injury during shift, Side rails up x 2, call light by bedside.   Remained NPO

## 2023-04-17 NOTE — NURSING
New admit report received from Elvie VALDES RN @23:05. Room prepared  awaiting patient arrival.

## 2023-04-17 NOTE — H&P
[facility] - Emergency Dept  [facility] Medicine  History & Physical    Patient Name: Edita Riley  MRN: [id number]  Patient Class: OP- Observation  Admission Date: 4/16/2023  Attending Physician: Joe Muse MD   Primary Care Provider: Primary Doctor No         Patient information was obtained from patient, past medical records and ER records.     Subjective:     Principal Problem:Urinary tract infection associated with nephrostomy catheter    Chief Complaint:   Chief Complaint   Patient presents with    Flank Pain     Pt c/o flank pain.  States has kidney stones.  Pt has bilateral nephrostomy tubes in place.         HPI: 59 yo F with PMHx distal ureteral strictures due to XRT for cervical cancer s/p transverse colon conduit and B/L nephrostomy tubes complicated by MDR infections who presents to the ED complaining of R sided flank pain since today. Pt. has been seeing Urology for strictures, hydronephrosis, and nephrolithiasis. She was recently noted to have worsening R sided hydronephrosis with imaging revealing Right nephrostomy tube appears to be bypassing the renal parenchyma and directly entering the renal pelvis as well as a 4 mm R mid ureteral stone. She was to be direct admitted tomorrow  for R PCNL, R antegrade URS, stone basket extraction and with pre-op antibiotics. Pt. Reports that she developed severe R sided flank pain today, however, so she came to the ED tonight. She denies any fevers, chills, weakness, or changes in urine output. She just reports that her flank pain worsened to the point that she thought her kidneys were worsening, so she came to the hospital today.              Past Medical History:   Diagnosis Date    Abnormal mammogram 08/25/2020    Acute blood loss anemia     Acute deep vein thrombosis (DVT) of lower extremity 12/09/2020    Advance care planning 04/30/2021    Anemia due to chronic blood loss     Anemia due to chronic kidney disease     Anxiety     Bilateral  ureteral obstruction 9/11/2020    Cardiovascular event risk -- low 09/14/2015    ASCVD 10-year risk 1.9% (with optimal risk factors 1.3%) as of 9/14/2015     Cervical cancer 2014    Chronic back pain     Colostomy care     Deep vein thrombosis     Depression     Diarrhea due to malabsorption 11/14/2018    Diarrhea due to malabsorption 11/14/2018    Difficult intubation     Disorder of kidney and ureter     DVT of lower extremity, bilateral 11/04/2020    Emphysema of lung 4/10/2023    Fibromyalgia     Fungemia 09/27/2020    Generalized abdominal pain 08/25/2020    GERD (gastroesophageal reflux disease)     Hemifacial spasm 09/16/2015    Hiatal hernia 2014    History of cervical cancer 10/11/2018    Hx of psychiatric care     Cymbalta, trazodone    Hypertension     Hypomagnesemia 11/21/2018    Lactose intolerance     Metastatic squamous cell carcinoma to lymph node 10/11/2018    Nephrostomy tube displaced     Neuropathy due to chemotherapeutic drug 11/14/2018    Osteoarthritis of back     Peritonitis 09/22/2020    Pseudomonas urinary tract infection 04/21/2021    Psychiatric problem     Refusal of blood transfusions as patient is Yarsani     Altzki's ring 09/14/2015    Seen on outside EGD 05/2014, underwent esophageal dilatation. Bx were negative.     Seizures     Sleep stage dysfunction     Noted on PSG 06/2017; negative for obstructive sleep apnea     Stroke     Urinary tract infection associated with nephrostomy catheter 06/11/2020    Wound infection after surgery 09/24/2020       Past Surgical History:   Procedure Laterality Date    ANTEGRADE NEPHROSTOGRAPHY Bilateral 9/28/2020    Procedure: Nephrostogram - antegrade;  Surgeon: Leslie Balbuena MD;  Location: [facility] OR [address] [place] [address];  Service: Urology;  Laterality: Bilateral;    ANTEGRADE NEPHROSTOGRAPHY Left 4/20/2021    Procedure: NEPHROSTOGRAM, ANTEGRADE;  Surgeon: Leslie Balbuena MD;  Location: [facility] OR [address] [place] [address];   Service: Urology;  Laterality: Left;    ANTEGRADE NEPHROSTOGRAPHY Right 10/27/2022    Procedure: NEPHROSTOGRAM, ANTEGRADE;  Surgeon: Chirag Russ MD;  [facility];  Service: Urology;  Laterality: Right;    BILATERAL OOPHORECTOMY  2015    CHOLECYSTECTOMY  11/09/2016    Done at [facility], path showed chronic cholecystitis and gallstones    cold knife conization  2014    COLECTOMY, RIGHT  9/17/2020    Procedure: COLECTOMY, RIGHT Extended;  Surgeon: Hammad Reynolds MD;  Location: [facility] OR 2ND FLR;  Service: Colon and Rectal;;    COLONOSCOPY  2014    COLONOSCOPY N/A 10/24/2016    at [facility], Dr Gutiérrez    COLONOSCOPY N/A 5/18/2018    Procedure: COLONOSCOPY;  Surgeon: Arden Gutiérrez MD;  Location: [facility] (4TH FLR);  Service: Endoscopy;  Laterality: N/A;    COLONOSCOPY N/A 7/28/2020    Procedure: COLONOSCOPY;  Surgeon: Hammad Reynolds MD;  Location: [facility] (4TH FLR);  Service: Colon and Rectal;  Laterality: N/A;  covid test [place] 7/25    CYSTOSCOPY WITH URETEROSCOPY, RETROGRADE PYELOGRAPHY, AND INSERTION OF STENT Bilateral 3/21/2020    Procedure: CYSTOSCOPY, WITH RETROGRADE PYELOGRAM,;  Surgeon: Leslie Balbuena MD;  [facility];  Service: Urology;  Laterality: Bilateral;    DILATION OF NEPHROSTOMY TRACT Right 10/27/2022    Procedure: DILATION, NEPHROSTOMY TRACT;  Surgeon: Chirag Russ MD;  [facility];  Service: Urology;  Laterality: Right;    ESOPHAGOGASTRODUODENOSCOPY  2014    ESOPHAGOGASTRODUODENOSCOPY  11/18/2020    ESOPHAGOGASTRODUODENOSCOPY N/A 11/18/2020    Procedure: ESOPHAGOGASTRODUODENOSCOPY (EGD);  Surgeon: Zenon Spencer MD;  Location: [facility];  Service: Endoscopy;  Laterality: N/A;    ESOPHAGOGASTRODUODENOSCOPY N/A 12/11/2020    Procedure: EGD (ESOPHAGOGASTRODUODENOSCOPY);  Surgeon: Juancho Muse MD;  Location: [facility] (2ND FLR);  Service: Endoscopy;  Laterality: N/A;    HYSTERECTOMY  2014    [facility] for cervical cancer     ILEOSTOMY  9/17/2020    Procedure: CREATION, ILEOSTOMY;  Surgeon: Hammad Reynolds MD;  Location: [facility] OR 2ND FLR;  Service: Colon and Rectal;;    LOOPOGRAM N/A 4/20/2021    Procedure: LOOPOGRAM;  Surgeon: Leslie Balbuena MD;  Location: [facility] OR 1ST FLR;  Service: Urology;  Laterality: N/A;    MOBILIZATION OF SPLENIC FLEXURE N/A 9/11/2020    Procedure: MOBILIZATION, COLONIC;  Surgeon: Hammad Reynolds MD;  Location: [facility] OR 2ND FLR;  Service: Colon and Rectal;  Laterality: N/A;    NEPHROSTOGRAPHY Bilateral 4/17/2021    Procedure: Nephrostogram;  Surgeon: Celeste Surgeon;  Location: [facility];  Service: Anesthesiology;  Laterality: Bilateral;    OOPHORECTOMY      PYELOSCOPY Right 10/27/2022    Procedure: PYELOSCOPY;  Surgeon: Chirag Russ MD;  Location: [facility] OR [facility] FLR;  Service: Urology;  Laterality: Right;    REPLACEMENT OF NEPHROSTOMY TUBE Right 10/27/2022    Procedure: REPLACEMENT, NEPHROSTOMY TUBE;  Surgeon: Chirag Russ MD;  Location: [facility] OR [facility] FLR;  Service: Urology;  Laterality: Right;    RETROPERITONEAL LYMPHADENECTOMY Right 9/17/2018    Procedure: LYMPHADENECTOMY, RETROPERITONEUM;  Surgeon: Sebas Reed MD;  Location: [facility] OR 2ND FLR;  Service: General;  Laterality: Right;  ILIAC    URETEROSCOPIC REMOVAL OF URETERIC CALCULUS Right 10/27/2022    Procedure: REMOVAL, CALCULUS, URETER, URETEROSCOPIC;  Surgeon: Chirag Russ MD;  [facility];  Service: Urology;  Laterality: Right;    URETEROSCOPY Right 10/27/2022    Procedure: URETEROSCOPY-ANTEGRADE;  Surgeon: Chirag Russ MD;  Location: [facility] OR [facility] FLR;  Service: Urology;  Laterality: Right;       Review of patient's allergies indicates:   Allergen Reactions    Bee sting [allergen ext-venom-honey bee]      Rash      Grass pollen-bermuda, standard      rash       No current facility-administered medications on file prior to encounter.     Current Outpatient Medications on File Prior to Encounter   Medication Sig     gabapentin (NEURONTIN) 100 MG capsule Take 2 capsules (200 mg total) by mouth every evening.    ketorolac (TORADOL) 10 mg tablet Take 1 tablet (10 mg total) by mouth every 6 (six) hours as needed for Pain.    sucralfate (CARAFATE) 1 gram tablet Take 1 tablet (1 g total) by mouth 4 (four) times daily before meals and nightly.    traMADoL (ULTRAM) 50 mg tablet Take 1 tablet (50 mg total) by mouth every 6 (six) hours.    acetaminophen (TYLENOL) 500 MG tablet Take 500 mg by mouth daily as needed for Pain.    albuterol (VENTOLIN HFA) 90 mcg/actuation inhaler Inhale 2 puffs into the lungs every 6 (six) hours as needed for Wheezing or Shortness of Breath. Rescue    melatonin (MELATIN) 3 mg tablet Take 2 tablets (6 mg total) by mouth nightly as needed for Insomnia.    mirtazapine (REMERON SOL-TAB) 15 MG disintegrating tablet Dissolve 1 tablet (15 mg total) by mouth nightly.    sodium bicarbonate 650 MG tablet Take 2 tablets (1,300 mg total) by mouth 2 (two) times daily.     Family History       Problem Relation (Age of Onset)    Breast cancer Maternal Aunt    Cancer Father, Mother    Cervical cancer Mother    Colon cancer Father    Drug abuse Daughter, Daughter    Esophageal cancer Father    Heart disease Sister, Maternal Grandmother    Suicide Daughter          Tobacco Use    Smoking status: Never    Smokeless tobacco: Never   Substance and Sexual Activity    Alcohol use: No     Alcohol/week: 0.0 standard drinks    Drug use: No    Sexual activity: Yes     Partners: Male     Birth control/protection: None     Comment:  19 years since 1999     Review of Systems   Constitutional:  Negative for activity change, appetite change, chills, fever and unexpected weight change.   HENT:  Negative for congestion and sore throat.    Respiratory:  Negative for cough and shortness of breath.    Cardiovascular:  Negative for chest pain, palpitations and leg swelling.   Gastrointestinal:  Negative for abdominal  distention, abdominal pain, blood in stool, constipation, diarrhea, nausea and vomiting.   Genitourinary:  Positive for flank pain. Negative for difficulty urinating, dysuria and hematuria.   Musculoskeletal:  Positive for arthralgias. Negative for myalgias.   Skin:  Negative for color change and rash.   Neurological:  Negative for dizziness, tremors and seizures.   Objective:     Vital Signs (Most Recent):  Temp: 98.7 °F (37.1 °C) (04/16/23 1427)  Pulse: 61 (04/16/23 1901)  Resp: 18 (04/16/23 1627)  BP: (!) 105/57 (04/16/23 1901)  SpO2: 100 % (04/16/23 1901)   Vital Signs (24h Range):  Temp:  [98.7 °F (37.1 °C)] 98.7 °F (37.1 °C)  Pulse:  [61-79] 61  Resp:  [18-20] 18  SpO2:  [100 %] 100 %  BP: ()/(52-62) 105/57     Weight: 83.9 kg (185 lb)  Body mass index is 27.32 kg/m².    Physical Exam  Vitals reviewed.   Constitutional:       General: She is not in acute distress.     Appearance: She is well-developed.   HENT:      Head: Normocephalic and atraumatic.   Eyes:      Extraocular Movements: Extraocular movements intact.      Pupils: Pupils are equal, round, and reactive to light.   Neck:      Vascular: No JVD.      Trachea: No tracheal deviation.   Cardiovascular:      Rate and Rhythm: Normal rate and regular rhythm.      Heart sounds: No murmur heard.    No friction rub. No gallop.   Pulmonary:      Effort: No respiratory distress.      Breath sounds: Normal breath sounds. No wheezing or rales.   Abdominal:      General: Bowel sounds are normal. There is no distension.      Palpations: Abdomen is soft. There is no mass.      Tenderness: There is abdominal tenderness.      Comments: R sided CVA and flank tenderness, B/L nephrostomy tubes in place, ileostomy bag present, urostomy present   Musculoskeletal:         General: No deformity.      Cervical back: Neck supple.   Lymphadenopathy:      Cervical: No cervical adenopathy.   Skin:     General: Skin is warm and dry.      Findings: No rash.   Neurological:       Mental Status: She is alert and oriented to person, place, and time.         CRANIAL NERVES     CN III, IV, VI   Pupils are equal, round, and reactive to light.     Significant Labs: All pertinent labs within the past 24 hours have been reviewed.    Significant Imaging: I have reviewed all pertinent imaging results/findings within the past 24 hours.    Assessment/Plan:     * Urinary tract infection associated with nephrostomy catheter  -Pt. 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 IV vancomycin and ertapenem started in ED, will continue vanc and consult ID for further abx recommendations. Repeat cultures ordered      CKD (chronic kidney disease), stage III  -Cr stable at 1.3, no acute issues      Ileostomy in place  -Pt. transverse colon conduit 2020 complicated by bowel perforation requiring hemicolectomy and ileostomy. No acute issues, ostomy bag in place      Bilateral ureteral obstruction  -Pt. S/p transverson colono conduit and B/L nephrostomy tubes, now with owrsening R sided hydronephrosis  -Urology consulted, original plan for R PCNL (new access with IR arranged at the same time), R antegrade URS, stone basket extraction later this week. Will leave NPO @[facility] in case of need for expedited intervention        VTE Risk Mitigation (From admission, onward)         Ordered     enoxaparin injection 40 mg  Daily         04/16/23 1930     IP VTE HIGH RISK PATIENT  Once         04/16/23 1930     Place sequential compression device  Until discontinued         04/16/23 1930     Place sequential compression device  Until discontinued         04/16/23 1930                   On 04/16/2023, patient should be placed in hospital observation services under my care.        Joe Muse MD  Department of Hospital Medicine  [facility] Emergency Dept

## 2023-04-17 NOTE — SUBJECTIVE & OBJECTIVE
Past Medical History:   Diagnosis Date    Abnormal mammogram 08/25/2020    Acute blood loss anemia     Acute deep vein thrombosis (DVT) of lower extremity 12/09/2020    Advance care planning 04/30/2021    Anemia due to chronic blood loss     Anemia due to chronic kidney disease     Anxiety     Bilateral ureteral obstruction 9/11/2020    Cardiovascular event risk -- low 09/14/2015    ASCVD 10-year risk 1.9% (with optimal risk factors 1.3%) as of 9/14/2015     Cervical cancer 2014    Chronic back pain     Colostomy care     Deep vein thrombosis     Depression     Diarrhea due to malabsorption 11/14/2018    Diarrhea due to malabsorption 11/14/2018    Difficult intubation     Disorder of kidney and ureter     DVT of lower extremity, bilateral 11/04/2020    Emphysema of lung 4/10/2023    Fibromyalgia     Fungemia 09/27/2020    Generalized abdominal pain 08/25/2020    GERD (gastroesophageal reflux disease)     Hemifacial spasm 09/16/2015    Hiatal hernia 2014    History of cervical cancer 10/11/2018    Hx of psychiatric care     Cymbalta, trazodone    Hypertension     Hypomagnesemia 11/21/2018    Lactose intolerance     Metastatic squamous cell carcinoma to lymph node 10/11/2018    Nephrostomy tube displaced     Neuropathy due to chemotherapeutic drug 11/14/2018    Osteoarthritis of back     Peritonitis 09/22/2020    Pseudomonas urinary tract infection 04/21/2021    Psychiatric problem     Refusal of blood transfusions as patient is [employer]     Ranjit's annette 09/14/2015    Seen on outside EGD 05/2014, underwent esophageal dilatation. Bx were negative.     Seizures     Sleep stage dysfunction     Noted on PSG 06/2017; negative for obstructive sleep apnea     Stroke     Urinary tract infection associated with nephrostomy catheter 06/11/2020    Wound infection after surgery 09/24/2020       Past Surgical History:   Procedure Laterality Date    ANTEGRADE NEPHROSTOGRAPHY Bilateral 9/28/2020    Procedure:  Nephrostogram - antegrade;  Surgeon: Leslie Balbuena MD;  Location: [facility] OR 1ST FLR;  Service: Urology;  Laterality: Bilateral;    ANTEGRADE NEPHROSTOGRAPHY Left 4/20/2021    Procedure: NEPHROSTOGRAM, ANTEGRADE;  Surgeon: Leslie Balbuena MD;  Location: [facility] OR 1ST FLR;  Service: Urology;  Laterality: Left;    ANTEGRADE NEPHROSTOGRAPHY Right 10/27/2022    Procedure: NEPHROSTOGRAM, ANTEGRADE;  Surgeon: Chirag Russ MD;  [facility];  Service: Urology;  Laterality: Right;    BILATERAL OOPHORECTOMY  2015    CHOLECYSTECTOMY  11/09/2016    Done at [facility], path showed chronic cholecystitis and gallstones    cold knife conization  2014    COLECTOMY, RIGHT  9/17/2020    Procedure: COLECTOMY, RIGHT Extended;  Surgeon: Hammad Reynolds MD;  Location: [facility] OR 2ND FLR;  Service: Colon and Rectal;;    COLONOSCOPY  2014    COLONOSCOPY N/A 10/24/2016    at [facility], Dr Gutiérrez    COLONOSCOPY N/A 5/18/2018    Procedure: COLONOSCOPY;  Surgeon: Arden Gutiérrez MD;  Location: [facility] (4TH FLR);  Service: Endoscopy;  Laterality: N/A;    COLONOSCOPY N/A 7/28/2020    Procedure: COLONOSCOPY;  Surgeon: Hammad Reynolds MD;  Location: [facility] ENDO (4TH FLR);  Service: Colon and Rectal;  Laterality: N/A;  covid test [place] 7/25    CYSTOSCOPY WITH URETEROSCOPY, RETROGRADE PYELOGRAPHY, AND INSERTION OF STENT Bilateral 3/21/2020    Procedure: CYSTOSCOPY, WITH RETROGRADE PYELOGRAM,;  Surgeon: Leslie Balbuena MD;  [facility];  Service: Urology;  Laterality: Bilateral;    DILATION OF NEPHROSTOMY TRACT Right 10/27/2022    Procedure: DILATION, NEPHROSTOMY TRACT;  Surgeon: Chirag Russ MD;  [facility];  Service: Urology;  Laterality: Right;    ESOPHAGOGASTRODUODENOSCOPY  2014    ESOPHAGOGASTRODUODENOSCOPY  11/18/2020    ESOPHAGOGASTRODUODENOSCOPY N/A 11/18/2020    Procedure: ESOPHAGOGASTRODUODENOSCOPY (EGD);  Surgeon: Zenon Spencer MD;  Location: [facility];  Service: Endoscopy;   Laterality: N/A;    ESOPHAGOGASTRODUODENOSCOPY N/A 12/11/2020    Procedure: EGD (ESOPHAGOGASTRODUODENOSCOPY);  Surgeon: Juancho Muse MD;  Location: [facility] ENDO (2ND FLR);  Service: Endoscopy;  Laterality: N/A;    HYSTERECTOMY  2014    [facility] for cervical cancer    ILEOSTOMY  9/17/2020    Procedure: CREATION, ILEOSTOMY;  Surgeon: Hammad Reynolds MD;  Location: [facility] OR 2ND FLR;  Service: Colon and Rectal;;    LOOPOGRAM N/A 4/20/2021    Procedure: LOOPOGRAM;  Surgeon: Leslie Balbuena MD;  Location: [facility] OR 1ST FLR;  Service: Urology;  Laterality: N/A;    MOBILIZATION OF SPLENIC FLEXURE N/A 9/11/2020    Procedure: MOBILIZATION, COLONIC;  Surgeon: Hammad Reynolds MD;  Location: [facility] OR 2ND FLR;  Service: Colon and Rectal;  Laterality: N/A;    NEPHROSTOGRAPHY Bilateral 4/17/2021    Procedure: Nephrostogram;  Surgeon: Celeste Surgeon;  Location: [facility];  Service: Anesthesiology;  Laterality: Bilateral;    OOPHORECTOMY      PYELOSCOPY Right 10/27/2022    Procedure: PYELOSCOPY;  Surgeon: Chirag Russ MD;  [facility];  Service: Urology;  Laterality: Right;    REPLACEMENT OF NEPHROSTOMY TUBE Right 10/27/2022    Procedure: REPLACEMENT, NEPHROSTOMY TUBE;  Surgeon: Chirag Russ MD;  [facility];  Service: Urology;  Laterality: Right;    RETROPERITONEAL LYMPHADENECTOMY Right 9/17/2018    Procedure: LYMPHADENECTOMY, RETROPERITONEUM;  Surgeon: Sebas Reed MD;  Location: [facility] OR 2ND FLR;  Service: General;  Laterality: Right;  ILIAC    URETEROSCOPIC REMOVAL OF URETERIC CALCULUS Right 10/27/2022    Procedure: REMOVAL, CALCULUS, URETER, URETEROSCOPIC;  Surgeon: Chirag Russ MD;  [facility];  Service: Urology;  Laterality: Right;    URETEROSCOPY Right 10/27/2022    Procedure: URETEROSCOPY-ANTEGRADE;  Surgeon: Chirag Russ MD;  Location: [facility] OR [facility] FLR;  Service: Urology;  Laterality: Right;       Review of patient's allergies indicates:   Allergen Reactions    Bee  sting [allergen ext-venom-honey bee]      Rash      Grass pollen-bermuda, standard      rash       Medications:  Medications Prior to Admission   Medication Sig    gabapentin (NEURONTIN) 100 MG capsule Take 2 capsules (200 mg total) by mouth every evening.    ketorolac (TORADOL) 10 mg tablet Take 1 tablet (10 mg total) by mouth every 6 (six) hours as needed for Pain.    sucralfate (CARAFATE) 1 gram tablet Take 1 tablet (1 g total) by mouth 4 (four) times daily before meals and nightly.    traMADoL (ULTRAM) 50 mg tablet Take 1 tablet (50 mg total) by mouth every 6 (six) hours.    acetaminophen (TYLENOL) 500 MG tablet Take 500 mg by mouth daily as needed for Pain.    albuterol (VENTOLIN HFA) 90 mcg/actuation inhaler Inhale 2 puffs into the lungs every 6 (six) hours as needed for Wheezing or Shortness of Breath. Rescue    melatonin (MELATIN) 3 mg tablet Take 2 tablets (6 mg total) by mouth nightly as needed for Insomnia.    mirtazapine (REMERON SOL-TAB) 15 MG disintegrating tablet Dissolve 1 tablet (15 mg total) by mouth nightly.    sodium bicarbonate 650 MG tablet Take 2 tablets (1,300 mg total) by mouth 2 (two) times daily.     Antibiotics (From admission, onward)      Start     Stop Route Frequency Ordered    04/17/23 2100  vancomycin 1,500 mg in dextrose 5 % (D5W) 250 mL IVPB (Vial-Mate)         -- IV Every 24 hours (non-standard times) 04/17/23 1300    04/17/23 1400  ertapenem (INVANZ) 1 g in sodium chloride 0.9 % 100 mL IVPB (MB+)         -- IV Every 24 hours (non-standard times) 04/17/23 1248    04/17/23 1347  vancomycin - pharmacy to dose  (vancomycin IVPB)        See Hyperspace for full Linked Orders Report.    -- IV pharmacy to manage frequency 04/17/23 1248          Antifungals (From admission, onward)      None          Antivirals (From admission, onward)      None             Immunization History   Administered Date(s) Administered    Influenza - Quadrivalent - PF *Preferred* (6 months and older)  2017, 2019    Tdap 2017       Family History       Problem Relation (Age of Onset)    Breast cancer Maternal Aunt    Cancer Father, Mother    Cervical cancer Mother    Colon cancer Father    Drug abuse Daughter, Daughter    Esophageal cancer Father    Heart disease Sister, Maternal Grandmother    Suicide Daughter          Social History     Socioeconomic History    Marital status:      Spouse name: Hammad    Number of children: 2   Tobacco Use    Smoking status: Never    Smokeless tobacco: Never   Substance and Sexual Activity    Alcohol use: No     Alcohol/week: 0.0 standard drinks    Drug use: No    Sexual activity: Yes     Partners: Male     Birth control/protection: None     Comment:  19 years since    Social History Narrative    , twin daughters (1  2018), disabled due to childhood stroke, [employer]     Social Determinants of Health     Financial Resource Strain: Low Risk     Difficulty of Paying Living Expenses: Not very hard   Food Insecurity: No Food Insecurity    Worried About Running Out of Food in the Last Year: Never true    Ran Out of Food in the Last Year: Never true   Transportation Needs: No Transportation Needs    Lack of Transportation (Medical): No    Lack of Transportation (Non-Medical): No   Physical Activity: Inactive    Days of Exercise per Week: 0 days    Minutes of Exercise per Session: 0 min   Stress: No Stress Concern Present    Feeling of Stress : Only a little   Social Connections: Moderately Isolated    Frequency of Communication with Friends and Family: More than three times a week    Frequency of Social Gatherings with Friends and Family: More than three times a week    Attends [employer] Services: Never    Active Member of Clubs or Organizations: No    Attends Club or Organization Meetings: Never    Marital Status:    Housing Stability: Low Risk     Unable to Pay for Housing in the Last Year: No    Number of  Places Lived in the Last Year: 1    Unstable Housing in the Last Year: No     Review of Systems   Constitutional:  Positive for fatigue. Negative for activity change, appetite change, diaphoresis and fever.   Respiratory:  Negative for cough and shortness of breath.    Gastrointestinal:  Negative for abdominal pain, constipation, diarrhea, nausea and vomiting.   Genitourinary:  Positive for difficulty urinating, dysuria and flank pain.   All other systems reviewed and are negative.  Objective:     Vital Signs (Most Recent):  Temp: 98.3 °F (36.8 °C) (04/17/23 1152)  Pulse: 67 (04/17/23 1152)  Resp: 18 (04/17/23 1205)  BP: (!) 103/59 (04/17/23 1152)  SpO2: 99 % (04/17/23 1152)   Vital Signs (24h Range):  Temp:  [97.5 °F (36.4 °C)-98.7 °F (37.1 °C)] 98.3 °F (36.8 °C)  Pulse:  [61-79] 67  Resp:  [14-20] 18  SpO2:  [98 %-100 %] 99 %  BP: ()/(52-62) 103/59     Weight: 89.6 kg (197 lb 8.5 oz)  Body mass index is 29.17 kg/m².    Estimated Creatinine Clearance: 64.9 mL/min (based on SCr of 1.1 mg/dL).    Physical Exam  Vitals and nursing note reviewed.   Constitutional:       General: She is not in acute distress.     Appearance: She is well-developed. She is not diaphoretic.   HENT:      Head: Normocephalic and atraumatic.   Eyes:      Pupils: Pupils are equal, round, and reactive to light.   Cardiovascular:      Rate and Rhythm: Normal rate and regular rhythm.      Heart sounds: Normal heart sounds. No murmur heard.    No friction rub. No gallop.   Pulmonary:      Effort: Pulmonary effort is normal. No respiratory distress.      Breath sounds: Normal breath sounds. No wheezing or rales.   Chest:      Chest wall: No tenderness.   Abdominal:      General: Bowel sounds are normal. There is no distension.      Palpations: Abdomen is soft. There is no mass.      Tenderness: There is no abdominal tenderness. There is no guarding or rebound.      Hernia: No hernia is present.      Comments: Ileal conduit  Colostomy bag  c/d/I watery stools   Musculoskeletal:         General: No tenderness or deformity. Normal range of motion.      Cervical back: Normal range of motion and neck supple.   Skin:     General: Skin is warm and dry.      Coloration: Skin is not pale.      Findings: No erythema.   Neurological:      Mental Status: She is alert and oriented to person, place, and time.      Cranial Nerves: No cranial nerve deficit.      Coordination: Coordination normal.   Psychiatric:         Behavior: Behavior normal.         Thought Content: Thought content normal.       Significant Labs: All pertinent labs within the past 24 hours have been reviewed.    Significant Imaging: I have reviewed all pertinent imaging results/findings within the past 24 hours.

## 2023-04-17 NOTE — ASSESSMENT & PLAN NOTE
[facility] 57 [facility] with PMHx distal ureteral strictures 2/2 XRT for cervical cancer, s/p transverse colon conduit and B/L nephrostomy tubes complicated by MDR infections who presents to the ED complaining of R sided flank pain since last night.  She is scheduled for R PCNL (new access with IR), R antegrade URS, stone basket extraction planned for mid ureteral stone on 4/21.   - continue IV abx, f/u ID recs   - currently on vac/ertapenem   - ok for diet from urology perspective   - NPO at midnight Thursday  - R PCNL on Friday 4/21   - PRN antiemetics, MM pain control   - rest of care per primary

## 2023-04-18 LAB
BACTERIA UR CULT: ABNORMAL
VANCOMYCIN TROUGH SERPL-MCNC: 13.5 UG/ML (ref 10–22)

## 2023-04-18 PROCEDURE — 27000207 HC ISOLATION

## 2023-04-18 PROCEDURE — 87186 SC STD MICRODIL/AGAR DIL: CPT | Performed by: STUDENT IN AN ORGANIZED HEALTH CARE EDUCATION/TRAINING PROGRAM

## 2023-04-18 PROCEDURE — 11000001 HC ACUTE MED/SURG PRIVATE ROOM

## 2023-04-18 PROCEDURE — 87077 CULTURE AEROBIC IDENTIFY: CPT | Performed by: STUDENT IN AN ORGANIZED HEALTH CARE EDUCATION/TRAINING PROGRAM

## 2023-04-18 PROCEDURE — 25000003 PHARM REV CODE 250: Performed by: INTERNAL MEDICINE

## 2023-04-18 PROCEDURE — 99232 PR SUBSEQUENT HOSPITAL CARE,LEVL II: ICD-10-PCS | Mod: ,,, | Performed by: INTERNAL MEDICINE

## 2023-04-18 PROCEDURE — 36415 COLL VENOUS BLD VENIPUNCTURE: CPT | Performed by: INTERNAL MEDICINE

## 2023-04-18 PROCEDURE — 63600175 PHARM REV CODE 636 W HCPCS: Performed by: INTERNAL MEDICINE

## 2023-04-18 PROCEDURE — 87086 URINE CULTURE/COLONY COUNT: CPT | Performed by: STUDENT IN AN ORGANIZED HEALTH CARE EDUCATION/TRAINING PROGRAM

## 2023-04-18 PROCEDURE — 99232 SBSQ HOSP IP/OBS MODERATE 35: CPT | Mod: ,,, | Performed by: INTERNAL MEDICINE

## 2023-04-18 PROCEDURE — 63600175 PHARM REV CODE 636 W HCPCS: Performed by: PHYSICIAN ASSISTANT

## 2023-04-18 PROCEDURE — 87088 URINE BACTERIA CULTURE: CPT | Performed by: STUDENT IN AN ORGANIZED HEALTH CARE EDUCATION/TRAINING PROGRAM

## 2023-04-18 PROCEDURE — 25000003 PHARM REV CODE 250: Performed by: PHYSICIAN ASSISTANT

## 2023-04-18 PROCEDURE — 80202 ASSAY OF VANCOMYCIN: CPT | Performed by: INTERNAL MEDICINE

## 2023-04-18 PROCEDURE — 63600175 PHARM REV CODE 636 W HCPCS: Performed by: HOSPITALIST

## 2023-04-18 PROCEDURE — 25000003 PHARM REV CODE 250: Performed by: HOSPITALIST

## 2023-04-18 RX ADMIN — TRAMADOL HYDROCHLORIDE 50 MG: 50 TABLET, COATED ORAL at 05:04

## 2023-04-18 RX ADMIN — TRAMADOL HYDROCHLORIDE 50 MG: 50 TABLET, COATED ORAL at 12:04

## 2023-04-18 RX ADMIN — ERTAPENEM 1 G: 1 INJECTION INTRAMUSCULAR; INTRAVENOUS at 03:04

## 2023-04-18 RX ADMIN — VANCOMYCIN HYDROCHLORIDE 1500 MG: 1.5 INJECTION, POWDER, LYOPHILIZED, FOR SOLUTION INTRAVENOUS at 09:04

## 2023-04-18 RX ADMIN — GABAPENTIN 200 MG: 100 CAPSULE ORAL at 09:04

## 2023-04-18 RX ADMIN — ENOXAPARIN SODIUM 40 MG: 40 INJECTION SUBCUTANEOUS at 05:04

## 2023-04-18 NOTE — PROGRESS NOTES
[facility]  Infectious Disease  Progress Note    Patient Name: Edita Riley  MRN: [id number]  Admission Date: 4/16/2023  Length of Stay: 0 days  Attending Physician: Wally Davenport MD  Primary Care Provider: Primary Doctor No    Isolation Status: Contact  Assessment/Plan:      Renal/  * Urinary tract infection associated with nephrostomy catheter  Mr. Riley is a 57 yo F with PMHx ureteral strictures 2/2 XRT for cervical cancer complicated by recurrent UTIs who presents to the ED complaining of R sided flank pain. She is scheduled for R PCNL , R antegrade URS, stone basket extraction planned for mid ureteral stone on 4/21 with urology. ID consulted for abx recommendations as with hx of MDRO UTIs.       Recommendations  1. Continue vancomycin as prior urine cultures 4/11 +E.faecium   2. Continue ertapenem for esbl klebsiella   3. Repeat urine cultures via right nephrostomy tube today. collette follow and tailor abx accordingly   4. ID will follow closely with you         Thank you for your consult. I will follow-up with patient. Please contact us if you have any additional questions.    Elizabeth Ron PA-C  Infectious Disease  [facility]    Subjective:     Principal Problem:Urinary tract infection associated with nephrostomy catheter    HPI: 57 yo F with PMHx distal ureteral strictures due to XRT for cervical cancer s/p transverse colon conduit and B/L nephrostomy tubes complicated by MDR infections who presents to the ED complaining of R sided flank pain since today. Pt. has been seeing Urology for strictures, hydronephrosis, and nephrolithiasis. She was recently noted to have worsening R sided hydronephrosis with imaging revealing Right nephrostomy tube appears to be bypassing the renal parenchyma and directly entering the renal pelvis as well as a 4 mm R mid ureteral stone. She was to be direct admitted tomorrow  for R PCNL, R antegrade URS, stone basket extraction and with pre-op  antibiotics. Pt. Reports that she developed severe R sided flank pain today, however, so she came to the ED tonight. She denies any fevers, chills, weakness, or changes in urine output. She just reports that her flank pain worsened to the point that she thought her kidneys were worsening, so she came to the hospital today.    Interval History:   CORNELIUS  Urine cultures repeated today via right nephrostomy tube       Review of Systems   Constitutional:  Positive for fatigue. Negative for activity change, appetite change, diaphoresis and fever.   Respiratory:  Negative for cough and shortness of breath.    Gastrointestinal:  Negative for abdominal pain, constipation, diarrhea, nausea and vomiting.   Genitourinary:  Positive for difficulty urinating, dysuria and flank pain.   All other systems reviewed and are negative.  Objective:     Vital Signs (Most Recent):  Temp: 97.7 °F (36.5 °C) (04/18/23 1150)  Pulse: 69 (04/18/23 1150)  Resp: 18 (04/18/23 1150)  BP: (!) 92/54 (04/18/23 1150)  SpO2: (!) 94 % (04/18/23 1150)   Vital Signs (24h Range):  Temp:  [97.3 °F (36.3 °C)-98 °F (36.7 °C)] 97.7 °F (36.5 °C)  Pulse:  [67-70] 69  Resp:  [14-19] 18  SpO2:  [94 %-100 %] 94 %  BP: ()/(51-63) 92/54     Weight: 89.6 kg (197 lb 8.5 oz)  Body mass index is 29.17 kg/m².    Estimated Creatinine Clearance: 64.9 mL/min (based on SCr of 1.1 mg/dL).    Physical Exam  Vitals and nursing note reviewed.   Constitutional:       General: She is not in acute distress.     Appearance: She is well-developed. She is not diaphoretic.   HENT:      Head: Normocephalic and atraumatic.   Eyes:      Pupils: Pupils are equal, round, and reactive to light.   Cardiovascular:      Rate and Rhythm: Normal rate and regular rhythm.      Heart sounds: Normal heart sounds. No murmur heard.    No friction rub. No gallop.   Pulmonary:      Effort: Pulmonary effort is normal. No respiratory distress.      Breath sounds: Normal breath sounds. No wheezing or  rales.   Chest:      Chest wall: No tenderness.   Abdominal:      General: Bowel sounds are normal. There is no distension.      Palpations: Abdomen is soft. There is no mass.      Tenderness: There is no abdominal tenderness. There is no guarding or rebound.      Hernia: No hernia is present.      Comments: Ileal conduit  Colostomy bag c/d/I watery stools   Musculoskeletal:         General: No tenderness or deformity. Normal range of motion.      Cervical back: Normal range of motion and neck supple.   Skin:     General: Skin is warm and dry.      Coloration: Skin is not pale.      Findings: No erythema.   Neurological:      Mental Status: She is alert and oriented to person, place, and time.      Cranial Nerves: No cranial nerve deficit.      Coordination: Coordination normal.   Psychiatric:         Behavior: Behavior normal.         Thought Content: Thought content normal.       Significant Labs: All pertinent labs within the past 24 hours have been reviewed.    Significant Imaging: I have reviewed all pertinent imaging results/findings within the past 24 hours.

## 2023-04-18 NOTE — ASSESSMENT & PLAN NOTE
Mr. Riley is a 59 yo F with PMHx ureteral strictures 2/2 XRT for cervical cancer complicated by recurrent UTIs who presents to the ED complaining of R sided flank pain. She is scheduled for R PCNL , R antegrade URS, stone basket extraction planned for mid ureteral stone on 4/21 with urology. ID consulted for abx recommendations as with hx of MDRO UTIs.       Recommendations  1. Continue vancomycin as prior urine cultures 4/11 +E.faecium   2. Continue ertapenem for esbl klebsiella   3. Repeat urine cultures via right nephrostomy tube today. collette follow and tailor abx accordingly   4. ID will follow closely with you

## 2023-04-18 NOTE — ASSESSMENT & PLAN NOTE
[facility] 59 [facility] with PMHx distal ureteral strictures 2/2 XRT for cervical cancer, s/p transverse colon conduit and B/L nephrostomy tubes complicated by MDR infections who presents to the ED complaining of R sided flank pain since last night.  She is scheduled for R PCNL (new access with IR), R antegrade URS, stone basket extraction planned for mid ureteral stone on 4/21.   - ID following, see recs   - currently on vanc/ertapenem   - ok for diet from urology perspective   - NPO at midnight Thursday  - R PCNL on Friday 4/21   - PRN antiemetics, MM pain control   - rest of care per primary

## 2023-04-18 NOTE — NURSING
Upon patient rounding, noticed pt gown was wet, patient voiced no concern, found urostomy bag  leaking and    from patient skin , replaced bag created 35mm opening for stoma

## 2023-04-18 NOTE — PROGRESS NOTES
[facility]  Urology  Progress Note    Patient Name: Edita Riley  MRN: [id number]  Admission Date: 4/16/2023  Hospital Length of Stay: 0 days  Code Status: Full Code   Attending Provider: Wally Davenport MD   Primary Care Physician: Primary Doctor No    Subjective:     HPI:  Edita Riley is a 59 yo F with PMHx distal ureteral strictures s/p transverse colon conduit and B/L nephrostomy tubes complicated by MDR infections who presents to the ED complaining of R sided flank pain since last night.     She has a history of distal ureteral strictures due to XRT for cervical cancer.  She underwent a transverse colon conduit on 9/11/2020.  The patient had bilateral hydronephrosis with reflux on the right side and a sluggish ureter on the left side. Bilateral PCNTs since Apr 2021.  She underwent a percutaneous antegrade approach on 10/27/2022 for ureteral stones.     She had a CT scan done on one of her ER visits on 12/2/2022 which showed a 2 mm stone in the right ureter and a 6 mm stone in the lower pole.  The left NT was accidentally removed on 2/13/2023 and it was replaced on 2/14/2023.  The right was replaced at the same time, per IR's note. Return visit to the ED 3/1/23 for dislodged L NT which was replaced on 3/2/23.  CT shows bilateral nephrostomy tubes in correct anatomic position, new onset right sided hydronephrosis, no hydro on the left, 4mm stone in the mid right ureter with proximal hydroureter, small lower pole renal stones in the right kidney.     She was recently seen in urology clinic and noted to have worsening R sided hydronephrosis with recent CT revealing Right NT appearing to be bypassing the renal parenchyma and directly entering the renal pelvis.  She also has a 4 mm R mid ureteral stone. She was initially planned to be direct admitted tomorrow for IV abx prior to R PCNL (new access with IR), R antegrade URS, stone basket extraction planned for 4/21.  Bilateral PCNT  last exchanged on 3/28.      On assessment, she is AFVSS.  She denies fevers, chills.  Since admission overnight, R NT w/ 90 cc of output and urostomy with 300 cc of output.  WBC 8, Cr 1.1 at baseline.  UA significant for 38 RBCs, 63 WBCs, moderate bacteria.  Blood cx and urine cx in process.  She is currently on vanc/ertapenem and ID has been consulted.                     Interval History: NAEO.  AFVSS.  On vanc/erta, ID following.  Blood cx NGTD.  Ucx growing enterococcus.        R NT -/265/-  Urostomy -/270/-    Review of Systems: per HPI   Objective:     Temp:  [97.3 °F (36.3 °C)-98.3 °F (36.8 °C)] 98 °F (36.7 °C)  Pulse:  [65-70] 70  Resp:  [14-19] 16  SpO2:  [95 %-100 %] 100 %  BP: ()/(51-63) 112/59     Body mass index is 29.17 kg/m².           Drains       Drain  Duration                  Urostomy 09/11/20 0000 ileal conduit  days         Ileostomy 09/18/20  days         Nephrostomy 02/14/23 Right 63 days         Nephrostomy 03/28/23 0953 Left 12 Fr. 20 days         Nephrostomy 03/28/23 0953 Right 12 Fr. 20 days                    Physical Exam  Constitutional:       Appearance: She is not toxic-appearing.   HENT:      Head: Normocephalic.   Cardiovascular:      Rate and Rhythm: Normal rate.   Pulmonary:      Effort: Pulmonary effort is normal. No respiratory distress.   Abdominal:      General: Abdomen is flat. There is no distension.      Palpations: Abdomen is soft.      Comments: Urostomy draining c/y/u.  R NT draining cloudy yellow urine.  L NT with minimal cloudy urine output.     Musculoskeletal:         General: No swelling or deformity. Normal range of motion.      Cervical back: Normal range of motion.   Skin:     General: Skin is warm and dry.      Findings: No erythema.   Neurological:      General: No focal deficit present.      Mental Status: She is alert.      Motor: No weakness.   Psychiatric:         Mood and Affect: Mood normal.         Behavior: Behavior normal.        Significant Labs:    BMP:  Recent Labs   Lab 04/16/23  1741 04/17/23  0357    137   K 4.4 3.9    110   CO2 18* 18*   BUN 16 14   CREATININE 1.1 1.1   CALCIUM 9.3 9.4       CBC:   Recent Labs   Lab 04/16/23  1621 04/17/23  0357   WBC 11.54 8.21   HGB 13.1 11.7*   HCT 44.1 39.0    231       Blood Culture:   Recent Labs   Lab 04/16/23  1602 04/16/23  1621   LABBLOO No Growth to date  No Growth to date No Growth to date  No Growth to date     Urine Culture:   Recent Labs   Lab 04/16/23  1746   LABURIN ENTEROCOCCUS SPECIES  >100,000 cfu/ml  Identification and susceptibility pending  *     Urine Studies:   Recent Labs   Lab 04/16/23  1746   COLORU Yellow   APPEARANCEUA Hazy*   PHUR 6.0   SPECGRAV 1.015   PROTEINUA 1+*   GLUCUA Negative   KETONESU Negative   BILIRUBINUA Negative   OCCULTUA 1+*   NITRITE Negative   LEUKOCYTESUR 3+*   RBCUA 38*   WBCUA 63*   BACTERIA Moderate*   HYALINECASTS 0     All pertinent labs results from the past 24 hours have been reviewed.  Recent Lab Results       None            Significant Imaging:  All pertinent imaging results/findings from the past 24 hours have been reviewed.                    Assessment/Plan:     Bilateral ureteral obstruction  Edita Riley is a 57 yo F with PMHx distal ureteral strictures 2/2 XRT for cervical cancer, s/p transverse colon conduit and B/L nephrostomy tubes complicated by MDR infections who presents to the ED complaining of R sided flank pain since last night.  She is scheduled for R PCNL (new access with IR), R antegrade URS, stone basket extraction planned for mid ureteral stone on 4/21.   - ID following, see recs   - currently on vanc/ertapenem   - ok for diet from urology perspective   - NPO at midnight Thursday  - R PCNL on Friday 4/21   - PRN antiemetics, MM pain control   - rest of care per primary           VTE Risk Mitigation (From admission, onward)         Ordered     enoxaparin injection 40 mg  Daily          04/16/23 1930     IP VTE HIGH RISK PATIENT  Once         04/16/23 1930     Place sequential compression device  Until discontinued         04/16/23 1930                Maryann Darnell MD  Urology  [facility] - Surgery

## 2023-04-18 NOTE — PROGRESS NOTES
[facility] Medicine  Progress Note    Patient Name: Edita Riley  MRN: [id number]  Patient Class: OP- Observation   Admission Date: 4/16/2023  Length of Stay: 0 days  Attending Physician: Wally Davenport MD  Primary Care Provider: Primary Doctor No        Subjective:     Principal Problem:Urinary tract infection associated with nephrostomy catheter        HPI:  59 yo F with PMHx distal ureteral strictures due to XRT for cervical cancer s/p transverse colon conduit and B/L nephrostomy tubes complicated by MDR infections who presents to the ED complaining of R sided flank pain since today. Pt. has been seeing Urology for strictures, hydronephrosis, and nephrolithiasis. She was recently noted to have worsening R sided hydronephrosis with imaging revealing Right nephrostomy tube appears to be bypassing the renal parenchyma and directly entering the renal pelvis as well as a 4 mm R mid ureteral stone. She was to be direct admitted tomorrow  for R PCNL, R antegrade URS, stone basket extraction and with pre-op antibiotics. Pt. Reports that she developed severe R sided flank pain today, however, so she came to the ED tonight. She denies any fevers, chills, weakness, or changes in urine output. She just reports that her flank pain worsened to the point that she thought her kidneys were worsening, so she came to the hospital today.              Overview/Hospital Course:  No notes on file    Interval History : Await UC, c./w abx, procedure planned for Friday    Review of Systems   Constitutional:  Negative for activity change, appetite change, chills, fever and unexpected weight change.   HENT:  Negative for congestion and sore throat.    Respiratory:  Negative for cough and shortness of breath.    Cardiovascular:  Negative for chest pain, palpitations and leg swelling.   Gastrointestinal:  Negative for abdominal distention, abdominal pain, blood in stool, constipation, diarrhea, nausea and  vomiting.   Genitourinary:  Positive for flank pain. Negative for difficulty urinating, dysuria and hematuria.   Musculoskeletal:  Positive for arthralgias. Negative for myalgias.   Skin:  Negative for color change and rash.   Neurological:  Negative for dizziness, tremors and seizures.   Objective:     Vital Signs (Most Recent):  Temp: 98 °F (36.7 °C) (04/18/23 0730)  Pulse: 69 (04/18/23 0730)  Resp: 18 (04/18/23 0730)  BP: (!) 103/58 (04/18/23 0730)  SpO2: 98 % (04/18/23 0730)   Vital Signs (24h Range):  Temp:  [97.3 °F (36.3 °C)-98.3 °F (36.8 °C)] 98 °F (36.7 °C)  Pulse:  [67-70] 69  Resp:  [14-19] 18  SpO2:  [95 %-100 %] 98 %  BP: ()/(51-63) 103/58     Weight: 89.6 kg (197 lb 8.5 oz)  Body mass index is 29.17 kg/m².    Intake/Output Summary (Last 24 hours) at 4/18/2023 0959  Last data filed at 4/18/2023 0627  Gross per 24 hour   Intake 14.82 ml   Output 1855 ml   Net -1840.18 ml      Physical Exam  Vitals reviewed.   Constitutional:       General: She is not in acute distress.     Appearance: She is well-developed.   HENT:      Head: Normocephalic and atraumatic.   Eyes:      Extraocular Movements: Extraocular movements intact.      Pupils: Pupils are equal, round, and reactive to light.   Neck:      Vascular: No JVD.      Trachea: No tracheal deviation.   Cardiovascular:      Rate and Rhythm: Normal rate and regular rhythm.      Heart sounds: No murmur heard.    No friction rub. No gallop.   Pulmonary:      Effort: No respiratory distress.      Breath sounds: Normal breath sounds. No wheezing or rales.   Abdominal:      General: Bowel sounds are normal. There is no distension.      Palpations: Abdomen is soft. There is no mass.      Tenderness: There is abdominal tenderness.      Comments: R sided CVA and flank tenderness, B/L nephrostomy tubes in place, ileostomy bag present, urostomy present   Musculoskeletal:         General: No deformity.      Cervical back: Neck supple.   Lymphadenopathy:       Cervical: No cervical adenopathy.   Skin:     General: Skin is warm and dry.      Findings: No rash.   Neurological:      Mental Status: She is alert and oriented to person, place, and time.       Significant Labs: All pertinent labs within the past 24 hours have been reviewed.  CBC:   Recent Labs   Lab 04/16/23  1621 04/17/23  0357   WBC 11.54 8.21   HGB 13.1 11.7*   HCT 44.1 39.0    231       Significant Imaging: I have reviewed all pertinent imaging results/findings within the past 24 hours.      Assessment/Plan:      * Urinary tract infection associated with nephrostomy catheter  -Pt. 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 IV vancomycin and ertapenem started in ED, will continue vanc and consult ID for further abx recommendations. Repeat cultures ordered      CKD (chronic kidney disease), stage III  -Cr stable at 1.3, no acute issues      Ileostomy in place  -Pt. transverse colon conduit 2020 complicated by bowel perforation requiring hemicolectomy and ileostomy. No acute issues, ostomy bag in place      Bilateral ureteral obstruction  -Pt. S/p transverson colono conduit and B/L nephrostomy tubes, now with owrsening R sided hydronephrosis  -Urology consulted, original plan for R PCNL (new access with IR arranged at the same time), R antegrade URS, stone basket extraction later this week. Will leave NPO @[facility] in case of need for expedited intervention        VTE Risk Mitigation (From admission, onward)         Ordered     enoxaparin injection 40 mg  Daily         04/16/23 1930     IP VTE HIGH RISK PATIENT  Once         04/16/23 1930     Place sequential compression device  Until discontinued         04/16/23 1930                Discharge Planning   OK: 4/22/2023     Code Status: Full Code   Is the patient medically ready for discharge?:     Reason for patient still in hospital (select all that  apply): Patient unstable  Discharge Plan A: Home with family                  Wally Davenport MD  Department of Hospital Medicine   [facility] - Surgery

## 2023-04-18 NOTE — SUBJECTIVE & OBJECTIVE
Interval History: NAEO.  AFVSS.  On vanc/erta, ID following.  Blood cx NGTD.  Ucx growing enterococcus.        R NT -/265/-  Urostomy -/270/-    Review of Systems: per HPI   Objective:     Temp:  [97.3 °F (36.3 °C)-98.3 °F (36.8 °C)] 98 °F (36.7 °C)  Pulse:  [65-70] 70  Resp:  [14-19] 16  SpO2:  [95 %-100 %] 100 %  BP: ()/(51-63) 112/59     Body mass index is 29.17 kg/m².           Drains       Drain  Duration                  Urostomy 09/11/20 0000 ileal conduit  days         Ileostomy 09/18/20  days         Nephrostomy 02/14/23 Right 63 days         Nephrostomy 03/28/23 0953 Left 12 Fr. 20 days         Nephrostomy 03/28/23 0953 Right 12 Fr. 20 days                    Physical Exam  Constitutional:       Appearance: She is not toxic-appearing.   HENT:      Head: Normocephalic.   Cardiovascular:      Rate and Rhythm: Normal rate.   Pulmonary:      Effort: Pulmonary effort is normal. No respiratory distress.   Abdominal:      General: Abdomen is flat. There is no distension.      Palpations: Abdomen is soft.      Comments: Urostomy draining c/y/u.  R NT draining cloudy yellow urine.  L NT with minimal cloudy urine output.     Musculoskeletal:         General: No swelling or deformity. Normal range of motion.      Cervical back: Normal range of motion.   Skin:     General: Skin is warm and dry.      Findings: No erythema.   Neurological:      General: No focal deficit present.      Mental Status: She is alert.      Motor: No weakness.   Psychiatric:         Mood and Affect: Mood normal.         Behavior: Behavior normal.       Significant Labs:    BMP:  Recent Labs   Lab 04/16/23  1741 04/17/23  0357    137   K 4.4 3.9    110   CO2 18* 18*   BUN 16 14   CREATININE 1.1 1.1   CALCIUM 9.3 9.4       CBC:   Recent Labs   Lab 04/16/23  1621 04/17/23  0357   WBC 11.54 8.21   HGB 13.1 11.7*   HCT 44.1 39.0    231       Blood Culture:   Recent Labs   Lab 04/16/23  1602 04/16/23  1654    LABBLOO No Growth to date  No Growth to date No Growth to date  No Growth to date     Urine Culture:   Recent Labs   Lab 04/16/23  1746   LABURIN ENTEROCOCCUS SPECIES  >100,000 cfu/ml  Identification and susceptibility pending  *     Urine Studies:   Recent Labs   Lab 04/16/23  1746   COLORU Yellow   APPEARANCEUA Hazy*   PHUR 6.0   SPECGRAV 1.015   PROTEINUA 1+*   GLUCUA Negative   KETONESU Negative   BILIRUBINUA Negative   OCCULTUA 1+*   NITRITE Negative   LEUKOCYTESUR 3+*   RBCUA 38*   WBCUA 63*   BACTERIA Moderate*   HYALINECASTS 0     All pertinent labs results from the past 24 hours have been reviewed.  Recent Lab Results       None            Significant Imaging:  All pertinent imaging results/findings from the past 24 hours have been reviewed.

## 2023-04-18 NOTE — SUBJECTIVE & OBJECTIVE
Interval History:   NAEON  Urine cultures repeated today via right nephrostomy tube       Review of Systems   Constitutional:  Positive for fatigue. Negative for activity change, appetite change, diaphoresis and fever.   Respiratory:  Negative for cough and shortness of breath.    Gastrointestinal:  Negative for abdominal pain, constipation, diarrhea, nausea and vomiting.   Genitourinary:  Positive for difficulty urinating, dysuria and flank pain.   All other systems reviewed and are negative.  Objective:     Vital Signs (Most Recent):  Temp: 97.7 °F (36.5 °C) (04/18/23 1150)  Pulse: 69 (04/18/23 1150)  Resp: 18 (04/18/23 1150)  BP: (!) 92/54 (04/18/23 1150)  SpO2: (!) 94 % (04/18/23 1150)   Vital Signs (24h Range):  Temp:  [97.3 °F (36.3 °C)-98 °F (36.7 °C)] 97.7 °F (36.5 °C)  Pulse:  [67-70] 69  Resp:  [14-19] 18  SpO2:  [94 %-100 %] 94 %  BP: ()/(51-63) 92/54     Weight: 89.6 kg (197 lb 8.5 oz)  Body mass index is 29.17 kg/m².    Estimated Creatinine Clearance: 64.9 mL/min (based on SCr of 1.1 mg/dL).    Physical Exam  Vitals and nursing note reviewed.   Constitutional:       General: She is not in acute distress.     Appearance: She is well-developed. She is not diaphoretic.   HENT:      Head: Normocephalic and atraumatic.   Eyes:      Pupils: Pupils are equal, round, and reactive to light.   Cardiovascular:      Rate and Rhythm: Normal rate and regular rhythm.      Heart sounds: Normal heart sounds. No murmur heard.    No friction rub. No gallop.   Pulmonary:      Effort: Pulmonary effort is normal. No respiratory distress.      Breath sounds: Normal breath sounds. No wheezing or rales.   Chest:      Chest wall: No tenderness.   Abdominal:      General: Bowel sounds are normal. There is no distension.      Palpations: Abdomen is soft. There is no mass.      Tenderness: There is no abdominal tenderness. There is no guarding or rebound.      Hernia: No hernia is present.      Comments: Ileal  conduit  Colostomy bag c/d/I watery stools   Musculoskeletal:         General: No tenderness or deformity. Normal range of motion.      Cervical back: Normal range of motion and neck supple.   Skin:     General: Skin is warm and dry.      Coloration: Skin is not pale.      Findings: No erythema.   Neurological:      Mental Status: She is alert and oriented to person, place, and time.      Cranial Nerves: No cranial nerve deficit.      Coordination: Coordination normal.   Psychiatric:         Behavior: Behavior normal.         Thought Content: Thought content normal.       Significant Labs: All pertinent labs within the past 24 hours have been reviewed.    Significant Imaging: I have reviewed all pertinent imaging results/findings within the past 24 hours.

## 2023-04-18 NOTE — SUBJECTIVE & OBJECTIVE
Interval History : Await UC, c./w abx, procedure planned for Friday    Review of Systems   Constitutional:  Negative for activity change, appetite change, chills, fever and unexpected weight change.   HENT:  Negative for congestion and sore throat.    Respiratory:  Negative for cough and shortness of breath.    Cardiovascular:  Negative for chest pain, palpitations and leg swelling.   Gastrointestinal:  Negative for abdominal distention, abdominal pain, blood in stool, constipation, diarrhea, nausea and vomiting.   Genitourinary:  Positive for flank pain. Negative for difficulty urinating, dysuria and hematuria.   Musculoskeletal:  Positive for arthralgias. Negative for myalgias.   Skin:  Negative for color change and rash.   Neurological:  Negative for dizziness, tremors and seizures.   Objective:     Vital Signs (Most Recent):  Temp: 98 °F (36.7 °C) (04/18/23 0730)  Pulse: 69 (04/18/23 0730)  Resp: 18 (04/18/23 0730)  BP: (!) 103/58 (04/18/23 0730)  SpO2: 98 % (04/18/23 0730)   Vital Signs (24h Range):  Temp:  [97.3 °F (36.3 °C)-98.3 °F (36.8 °C)] 98 °F (36.7 °C)  Pulse:  [67-70] 69  Resp:  [14-19] 18  SpO2:  [95 %-100 %] 98 %  BP: ()/(51-63) 103/58     Weight: 89.6 kg (197 lb 8.5 oz)  Body mass index is 29.17 kg/m².    Intake/Output Summary (Last 24 hours) at 4/18/2023 0959  Last data filed at 4/18/2023 0627  Gross per 24 hour   Intake 14.82 ml   Output 1855 ml   Net -1840.18 ml      Physical Exam  Vitals reviewed.   Constitutional:       General: She is not in acute distress.     Appearance: She is well-developed.   HENT:      Head: Normocephalic and atraumatic.   Eyes:      Extraocular Movements: Extraocular movements intact.      Pupils: Pupils are equal, round, and reactive to light.   Neck:      Vascular: No JVD.      Trachea: No tracheal deviation.   Cardiovascular:      Rate and Rhythm: Normal rate and regular rhythm.      Heart sounds: No murmur heard.    No friction rub. No gallop.   Pulmonary:       Effort: No respiratory distress.      Breath sounds: Normal breath sounds. No wheezing or rales.   Abdominal:      General: Bowel sounds are normal. There is no distension.      Palpations: Abdomen is soft. There is no mass.      Tenderness: There is abdominal tenderness.      Comments: R sided CVA and flank tenderness, B/L nephrostomy tubes in place, ileostomy bag present, urostomy present   Musculoskeletal:         General: No deformity.      Cervical back: Neck supple.   Lymphadenopathy:      Cervical: No cervical adenopathy.   Skin:     General: Skin is warm and dry.      Findings: No rash.   Neurological:      Mental Status: She is alert and oriented to person, place, and time.       Significant Labs: All pertinent labs within the past 24 hours have been reviewed.  CBC:   Recent Labs   Lab 04/16/23  1621 04/17/23  0357   WBC 11.54 8.21   HGB 13.1 11.7*   HCT 44.1 39.0    231       Significant Imaging: I have reviewed all pertinent imaging results/findings within the past 24 hours.

## 2023-04-19 PROCEDURE — 99232 SBSQ HOSP IP/OBS MODERATE 35: CPT | Mod: ,,, | Performed by: INTERNAL MEDICINE

## 2023-04-19 PROCEDURE — 25000003 PHARM REV CODE 250: Performed by: PHYSICIAN ASSISTANT

## 2023-04-19 PROCEDURE — 25000003 PHARM REV CODE 250: Performed by: INTERNAL MEDICINE

## 2023-04-19 PROCEDURE — 11000001 HC ACUTE MED/SURG PRIVATE ROOM

## 2023-04-19 PROCEDURE — 99232 PR SUBSEQUENT HOSPITAL CARE,LEVL II: ICD-10-PCS | Mod: ,,, | Performed by: INTERNAL MEDICINE

## 2023-04-19 PROCEDURE — 25000003 PHARM REV CODE 250: Performed by: HOSPITALIST

## 2023-04-19 PROCEDURE — 63600175 PHARM REV CODE 636 W HCPCS: Performed by: INTERNAL MEDICINE

## 2023-04-19 PROCEDURE — 63600175 PHARM REV CODE 636 W HCPCS: Performed by: PHYSICIAN ASSISTANT

## 2023-04-19 PROCEDURE — 63600175 PHARM REV CODE 636 W HCPCS: Performed by: HOSPITALIST

## 2023-04-19 PROCEDURE — 27000207 HC ISOLATION

## 2023-04-19 RX ADMIN — TRAMADOL HYDROCHLORIDE 50 MG: 50 TABLET, COATED ORAL at 12:04

## 2023-04-19 RX ADMIN — VANCOMYCIN HYDROCHLORIDE 1500 MG: 1.5 INJECTION, POWDER, LYOPHILIZED, FOR SOLUTION INTRAVENOUS at 10:04

## 2023-04-19 RX ADMIN — TRAMADOL HYDROCHLORIDE 50 MG: 50 TABLET, COATED ORAL at 05:04

## 2023-04-19 RX ADMIN — ENOXAPARIN SODIUM 40 MG: 40 INJECTION SUBCUTANEOUS at 05:04

## 2023-04-19 RX ADMIN — ERTAPENEM 1 G: 1 INJECTION INTRAMUSCULAR; INTRAVENOUS at 03:04

## 2023-04-19 RX ADMIN — GABAPENTIN 200 MG: 100 CAPSULE ORAL at 10:04

## 2023-04-19 NOTE — PROGRESS NOTES
Pharmacokinetic Assessment Follow Up: IV Vancomycin    Vancomycin serum concentration assessment(s):    The trough level was drawn correctly and can be used to guide therapy at this time. The measurement is within the desired definitive target range of 10 to 20 mcg/mL.    Vancomycin Regimen Plan:    Continue regimen to Vancomycin 1500 mg IV every 24 hours with next serum trough concentration measured at 2000 prior to 5th dose on 4/20.    Drug levels (last 3 results):  Recent Labs   Lab Result Units 04/18/23  2054   Vancomycin-Trough ug/mL 13.5       Pharmacy will continue to follow and monitor vancomycin.    Please contact pharmacy at extension [phone] for questions regarding this assessment.    Thank you for the consult,   Micaela Cummings       Patient brief summary:  Edita Riley is a 58 y.o. female initiated on antimicrobial therapy with IV Vancomycin for treatment of urinary tract infection      Drug Allergies:   Review of patient's allergies indicates:   Allergen Reactions    Bee sting [allergen ext-venom-honey bee]      Rash      Grass pollen-bermuda, standard      rash       Actual Body Weight:   89.6 kg    Renal Function:   Estimated Creatinine Clearance: 64.9 mL/min (based on SCr of 1.1 mg/dL).,     Dialysis Method (if applicable):  N/A

## 2023-04-19 NOTE — HOSPITAL COURSE
She is scheduled for R PCNL , R antegrade URS, stone basket extraction planned for mid ureteral stone on 4/21 with urology    UC with E. Faecium

## 2023-04-19 NOTE — PROGRESS NOTES
[facility]  Urology  Progress Note    Patient Name: Edita Riley  MRN: [id number]  Admission Date: 4/16/2023  Hospital Length of Stay: 1 days  Code Status: Full Code   Attending Provider: Wally Davenport MD   Primary Care Physician: Primary Doctor No    Subjective:     HPI:  Edita Riley is a 59 yo F with PMHx distal ureteral strictures s/p transverse colon conduit and B/L nephrostomy tubes complicated by MDR infections who presents to the ED complaining of R sided flank pain since last night.     She has a history of distal ureteral strictures due to XRT for cervical cancer.  She underwent a transverse colon conduit on 9/11/2020.  The patient had bilateral hydronephrosis with reflux on the right side and a sluggish ureter on the left side. Bilateral PCNTs since Apr 2021.  She underwent a percutaneous antegrade approach on 10/27/2022 for ureteral stones.     She had a CT scan done on one of her ER visits on 12/2/2022 which showed a 2 mm stone in the right ureter and a 6 mm stone in the lower pole.  The left NT was accidentally removed on 2/13/2023 and it was replaced on 2/14/2023.  The right was replaced at the same time, per IR's note. Return visit to the ED 3/1/23 for dislodged L NT which was replaced on 3/2/23.  CT shows bilateral nephrostomy tubes in correct anatomic position, new onset right sided hydronephrosis, no hydro on the left, 4mm stone in the mid right ureter with proximal hydroureter, small lower pole renal stones in the right kidney.     She was recently seen in urology clinic and noted to have worsening R sided hydronephrosis with recent CT revealing Right NT appearing to be bypassing the renal parenchyma and directly entering the renal pelvis.  She also has a 4 mm R mid ureteral stone. She was initially planned to be direct admitted tomorrow for IV abx prior to R PCNL (new access with IR), R antegrade URS, stone basket extraction planned for 4/21.  Bilateral PCNT  last exchanged on 3/28.      On assessment, she is AFVSS.  She denies fevers, chills.  Since admission overnight, R NT w/ 90 cc of output and urostomy with 300 cc of output.  WBC 8, Cr 1.1 at baseline.  UA significant for 38 RBCs, 63 WBCs, moderate bacteria.  Blood cx and urine cx in process.  She is currently on vanc/ertapenem and ID has been consulted.                     Interval History: NAEO, AFVSS. Tolerating PO intake. Denies fevers, chills.     R NT: 100/375/150  Urostomy: 50/50/400    Objective:     Temp:  [97.6 °F (36.4 °C)-99.3 °F (37.4 °C)] 99.3 °F (37.4 °C)  Pulse:  [69-75] 75  Resp:  [16-18] 16  SpO2:  [94 %-98 %] 97 %  BP: ()/(51-59) 108/53     Body mass index is 29.17 kg/m².           Drains       Drain  Duration                  Urostomy 09/11/20 0000 ileal conduit  days         Ileostomy 09/18/20  days         Nephrostomy 02/14/23 Right 64 days         Nephrostomy 03/28/23 0953 Left 12 Fr. 21 days         Nephrostomy 03/28/23 0953 Right 12 Fr. 21 days                    Physical Exam  Constitutional:       General: She is not in acute distress.     Appearance: Normal appearance. She is not ill-appearing or toxic-appearing.   HENT:      Head: Normocephalic and atraumatic.      Mouth/Throat:      Mouth: Mucous membranes are moist.   Eyes:      Extraocular Movements: Extraocular movements intact.   Cardiovascular:      Rate and Rhythm: Normal rate.   Pulmonary:      Effort: Pulmonary effort is normal. No respiratory distress.   Abdominal:      General: Abdomen is flat. There is no distension.      Palpations: Abdomen is soft.      Comments: Urostomy draining c/y/u.  R NT draining cloudy yellow urine.  L NT with minimal cloudy urine output.     Musculoskeletal:         General: No swelling or deformity. Normal range of motion.      Cervical back: Normal range of motion.   Skin:     General: Skin is warm and dry.      Findings: No erythema.   Neurological:      General: No focal  deficit present.      Mental Status: She is alert and oriented to person, place, and time.      Motor: No weakness.   Psychiatric:         Mood and Affect: Mood normal.         Behavior: Behavior normal.       Significant Labs:    BMP:  Recent Labs   Lab 04/16/23  1741 04/17/23  0357    137   K 4.4 3.9    110   CO2 18* 18*   BUN 16 14   CREATININE 1.1 1.1   CALCIUM 9.3 9.4       CBC:   Recent Labs   Lab 04/16/23  1621 04/17/23  0357   WBC 11.54 8.21   HGB 13.1 11.7*   HCT 44.1 39.0    231       Urine Studies:   Recent Labs   Lab 04/16/23  1746   COLORU Yellow   APPEARANCEUA Hazy*   PHUR 6.0   SPECGRAV 1.015   PROTEINUA 1+*   GLUCUA Negative   KETONESU Negative   BILIRUBINUA Negative   OCCULTUA 1+*   NITRITE Negative   LEUKOCYTESUR 3+*   RBCUA 38*   WBCUA 63*   BACTERIA Moderate*   HYALINECASTS 0     All pertinent labs results from the past 24 hours have been reviewed.    Significant Imaging:  All pertinent imaging results/findings from the past 24 hours have been reviewed.      Assessment/Plan:     Bilateral ureteral obstruction  Edita Riley is a 57 yo F with PMHx distal ureteral strictures 2/2 XRT for cervical cancer, s/p transverse colon conduit and B/L nephrostomy tubes complicated by MDR infections who presents to the ED complaining of R sided flank pain since last night.  She is scheduled for R PCNL (new access with IR), R antegrade URS, stone basket extraction planned for mid ureteral stone on 4/21.   - ID following, see recs   - currently on vanc/ertapenem   - ok for diet from urology perspective   - NPO at midnight Thursday  - R PCNL on Friday 4/21   - PRN antiemetics, MM pain control   - rest of care per primary         James Guzman MD  Urology  [facility]

## 2023-04-19 NOTE — ASSESSMENT & PLAN NOTE
[facility] 59 [facility] with PMHx distal ureteral strictures 2/2 XRT for cervical cancer, s/p transverse colon conduit and B/L nephrostomy tubes complicated by MDR infections who presents to the ED complaining of R sided flank pain since last night.  She is scheduled for R PCNL (new access with IR), R antegrade URS, stone basket extraction planned for mid ureteral stone on 4/21.   - ID following, see recs   - currently on vanc/ertapenem   - ok for diet from urology perspective   - NPO at midnight Thursday  - R PCNL on Friday 4/21   - PRN antiemetics, MM pain control   - rest of care per primary

## 2023-04-19 NOTE — SUBJECTIVE & OBJECTIVE
Interval History: Feeling well, She is scheduled for R PCNL , R antegrade URS, stone basket extraction planned for mid ureteral stone on 4/21 with urology. UC positive for E. Facium.    Review of Systems   Constitutional:  Negative for activity change, appetite change, chills, fever and unexpected weight change.   HENT:  Negative for congestion and sore throat.    Respiratory:  Negative for cough and shortness of breath.    Cardiovascular:  Negative for chest pain, palpitations and leg swelling.   Gastrointestinal:  Negative for abdominal distention, abdominal pain, blood in stool, constipation, diarrhea, nausea and vomiting.   Genitourinary:  Positive for flank pain. Negative for difficulty urinating, dysuria and hematuria.   Musculoskeletal:  Positive for arthralgias. Negative for myalgias.   Skin:  Negative for color change and rash.   Neurological:  Negative for dizziness, tremors and seizures.   Objective:     Vital Signs (Most Recent):  Temp: 98.2 °F (36.8 °C) (04/19/23 0738)  Pulse: 76 (04/19/23 0738)  Resp: 16 (04/19/23 0738)  BP: (!) 107/55 (04/19/23 0738)  SpO2: (!) 94 % (04/19/23 0738)   Vital Signs (24h Range):  Temp:  [97.6 °F (36.4 °C)-99.3 °F (37.4 °C)] 98.2 °F (36.8 °C)  Pulse:  [69-76] 76  Resp:  [16-18] 16  SpO2:  [94 %-98 %] 94 %  BP: ()/(51-59) 107/55     Weight: 89.6 kg (197 lb 8.5 oz)  Body mass index is 29.17 kg/m².    Intake/Output Summary (Last 24 hours) at 4/19/2023 0920  Last data filed at 4/19/2023 0549  Gross per 24 hour   Intake 845.18 ml   Output 2075 ml   Net -1229.82 ml      Physical Exam  Vitals reviewed.   Constitutional:       General: She is not in acute distress.     Appearance: She is well-developed.   HENT:      Head: Normocephalic and atraumatic.   Eyes:      Extraocular Movements: Extraocular movements intact.      Pupils: Pupils are equal, round, and reactive to light.   Neck:      Vascular: No JVD.      Trachea: No tracheal deviation.   Cardiovascular:      Rate and  Rhythm: Normal rate and regular rhythm.      Heart sounds: No murmur heard.    No friction rub. No gallop.   Pulmonary:      Effort: No respiratory distress.      Breath sounds: Normal breath sounds. No wheezing or rales.   Abdominal:      General: Bowel sounds are normal. There is no distension.      Palpations: Abdomen is soft. There is no mass.      Tenderness: There is abdominal tenderness.      Comments: R sided CVA and flank tenderness, B/L nephrostomy tubes in place, ileostomy bag present, urostomy present   Musculoskeletal:         General: No deformity.      Cervical back: Neck supple.   Lymphadenopathy:      Cervical: No cervical adenopathy.   Skin:     General: Skin is warm and dry.      Findings: No rash.   Neurological:      Mental Status: She is alert and oriented to person, place, and time.       Significant Labs: All pertinent labs within the past 24 hours have been reviewed.  CBC: No results for input(s): WBC, HGB, HCT, PLT in the last 48 hours.    Significant Imaging: I have reviewed all pertinent imaging results/findings within the past 24 hours.

## 2023-04-19 NOTE — PROGRESS NOTES
[facility] Medicine  Progress Note    Patient Name: Edita Riley  MRN: [id number]  Patient Class: IP- Inpatient   Admission Date: 4/16/2023  Length of Stay: 1 days  Attending Physician: Wally Davenport MD  Primary Care Provider: Primary Doctor No        Subjective:     Principal Problem:Urinary tract infection associated with nephrostomy catheter        HPI:  59 yo F with PMHx distal ureteral strictures due to XRT for cervical cancer s/p transverse colon conduit and B/L nephrostomy tubes complicated by MDR infections who presents to the ED complaining of R sided flank pain since today. Pt. has been seeing Urology for strictures, hydronephrosis, and nephrolithiasis. She was recently noted to have worsening R sided hydronephrosis with imaging revealing Right nephrostomy tube appears to be bypassing the renal parenchyma and directly entering the renal pelvis as well as a 4 mm R mid ureteral stone. She was to be direct admitted tomorrow  for R PCNL, R antegrade URS, stone basket extraction and with pre-op antibiotics. Pt. Reports that she developed severe R sided flank pain today, however, so she came to the ED tonight. She denies any fevers, chills, weakness, or changes in urine output. She just reports that her flank pain worsened to the point that she thought her kidneys were worsening, so she came to the hospital today.              Overview/Hospital Course:  She is scheduled for R PCNL , R antegrade URS, stone basket extraction planned for mid ureteral stone on 4/21 with urology    UC with E. Faecium      Interval History: Feeling well, She is scheduled for R PCNL , R antegrade URS, stone basket extraction planned for mid ureteral stone on 4/21 with urology. UC positive for E. Facium.    Review of Systems   Constitutional:  Negative for activity change, appetite change, chills, fever and unexpected weight change.   HENT:  Negative for congestion and sore throat.    Respiratory:   Negative for cough and shortness of breath.    Cardiovascular:  Negative for chest pain, palpitations and leg swelling.   Gastrointestinal:  Negative for abdominal distention, abdominal pain, blood in stool, constipation, diarrhea, nausea and vomiting.   Genitourinary:  Positive for flank pain. Negative for difficulty urinating, dysuria and hematuria.   Musculoskeletal:  Positive for arthralgias. Negative for myalgias.   Skin:  Negative for color change and rash.   Neurological:  Negative for dizziness, tremors and seizures.   Objective:     Vital Signs (Most Recent):  Temp: 98.2 °F (36.8 °C) (04/19/23 0738)  Pulse: 76 (04/19/23 0738)  Resp: 16 (04/19/23 0738)  BP: (!) 107/55 (04/19/23 0738)  SpO2: (!) 94 % (04/19/23 0738)   Vital Signs (24h Range):  Temp:  [97.6 °F (36.4 °C)-99.3 °F (37.4 °C)] 98.2 °F (36.8 °C)  Pulse:  [69-76] 76  Resp:  [16-18] 16  SpO2:  [94 %-98 %] 94 %  BP: ()/(51-59) 107/55     Weight: 89.6 kg (197 lb 8.5 oz)  Body mass index is 29.17 kg/m².    Intake/Output Summary (Last 24 hours) at 4/19/2023 0920  Last data filed at 4/19/2023 0549  Gross per 24 hour   Intake 845.18 ml   Output 2075 ml   Net -1229.82 ml      Physical Exam  Vitals reviewed.   Constitutional:       General: She is not in acute distress.     Appearance: She is well-developed.   HENT:      Head: Normocephalic and atraumatic.   Eyes:      Extraocular Movements: Extraocular movements intact.      Pupils: Pupils are equal, round, and reactive to light.   Neck:      Vascular: No JVD.      Trachea: No tracheal deviation.   Cardiovascular:      Rate and Rhythm: Normal rate and regular rhythm.      Heart sounds: No murmur heard.    No friction rub. No gallop.   Pulmonary:      Effort: No respiratory distress.      Breath sounds: Normal breath sounds. No wheezing or rales.   Abdominal:      General: Bowel sounds are normal. There is no distension.      Palpations: Abdomen is soft. There is no mass.      Tenderness: There is  abdominal tenderness.      Comments: R sided CVA and flank tenderness, B/L nephrostomy tubes in place, ileostomy bag present, urostomy present   Musculoskeletal:         General: No deformity.      Cervical back: Neck supple.   Lymphadenopathy:      Cervical: No cervical adenopathy.   Skin:     General: Skin is warm and dry.      Findings: No rash.   Neurological:      Mental Status: She is alert and oriented to person, place, and time.       Significant Labs: All pertinent labs within the past 24 hours have been reviewed.  CBC: No results for input(s): WBC, HGB, HCT, PLT in the last 48 hours.    Significant Imaging: I have reviewed all pertinent imaging results/findings within the past 24 hours.      Assessment/Plan:      * Urinary tract infection associated with nephrostomy catheter  -Pt. 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 IV vancomycin and ertapenem started in ED, will continue vanc and consult ID for further abx recommendations. Repeat cultures ordered      CKD (chronic kidney disease), stage III  -Cr stable at 1.3, no acute issues      Ileostomy in place  -Pt. transverse colon conduit 2020 complicated by bowel perforation requiring hemicolectomy and ileostomy. No acute issues, ostomy bag in place      Bilateral ureteral obstruction  -Pt. S/p transverson colono conduit and B/L nephrostomy tubes, now with owrsening R sided hydronephrosis  -Urology consulted, original plan for R PCNL (new access with IR arranged at the same time), R antegrade URS, stone basket extraction later this week. Will leave NPO @[facility] in case of need for expedited intervention        VTE Risk Mitigation (From admission, onward)         Ordered     enoxaparin injection 40 mg  Daily         04/16/23 1930     IP VTE HIGH RISK PATIENT  Once         04/16/23 1930     Place sequential compression device  Until discontinued          04/16/23 1930                Discharge Planning   OK: 4/22/2023     Code Status: Full Code   Is the patient medically ready for discharge?:     Reason for patient still in hospital (select all that apply): Patient unstable  Discharge Plan A: Home with family                  Wally Davenport MD  Department of Hospital Medicine   [facility] - Surgery

## 2023-04-20 ENCOUNTER — ANESTHESIA EVENT (OUTPATIENT)
Dept: SURGERY | Facility: HOSPITAL | Age: 58
DRG: 699 | End: 2023-04-20
Payer: MEDICAID

## 2023-04-20 LAB — VANCOMYCIN TROUGH SERPL-MCNC: 15.4 UG/ML (ref 10–22)

## 2023-04-20 PROCEDURE — 94761 N-INVAS EAR/PLS OXIMETRY MLT: CPT

## 2023-04-20 PROCEDURE — 99233 PR SUBSEQUENT HOSPITAL CARE,LEVL III: ICD-10-PCS | Mod: ,,, | Performed by: INTERNAL MEDICINE

## 2023-04-20 PROCEDURE — 63600175 PHARM REV CODE 636 W HCPCS: Performed by: PHYSICIAN ASSISTANT

## 2023-04-20 PROCEDURE — 80202 ASSAY OF VANCOMYCIN: CPT | Performed by: INTERNAL MEDICINE

## 2023-04-20 PROCEDURE — 99233 SBSQ HOSP IP/OBS HIGH 50: CPT | Mod: ,,, | Performed by: INTERNAL MEDICINE

## 2023-04-20 PROCEDURE — 27000207 HC ISOLATION

## 2023-04-20 PROCEDURE — 36415 COLL VENOUS BLD VENIPUNCTURE: CPT | Performed by: INTERNAL MEDICINE

## 2023-04-20 PROCEDURE — 25000003 PHARM REV CODE 250: Performed by: PHYSICIAN ASSISTANT

## 2023-04-20 PROCEDURE — 25000003 PHARM REV CODE 250: Performed by: INTERNAL MEDICINE

## 2023-04-20 PROCEDURE — 63600175 PHARM REV CODE 636 W HCPCS: Performed by: INTERNAL MEDICINE

## 2023-04-20 PROCEDURE — 25000003 PHARM REV CODE 250: Performed by: HOSPITALIST

## 2023-04-20 PROCEDURE — 11000001 HC ACUTE MED/SURG PRIVATE ROOM

## 2023-04-20 RX ADMIN — ERTAPENEM 1 G: 1 INJECTION INTRAMUSCULAR; INTRAVENOUS at 02:04

## 2023-04-20 RX ADMIN — TRAMADOL HYDROCHLORIDE 50 MG: 50 TABLET, COATED ORAL at 05:04

## 2023-04-20 RX ADMIN — TRAMADOL HYDROCHLORIDE 50 MG: 50 TABLET, COATED ORAL at 12:04

## 2023-04-20 RX ADMIN — VANCOMYCIN HYDROCHLORIDE 1250 MG: 1.25 INJECTION, POWDER, LYOPHILIZED, FOR SOLUTION INTRAVENOUS at 10:04

## 2023-04-20 RX ADMIN — GABAPENTIN 200 MG: 100 CAPSULE ORAL at 09:04

## 2023-04-20 NOTE — ASSESSMENT & PLAN NOTE
[facility] 57 [facility] with PMHx distal ureteral strictures 2/2 XRT for cervical cancer, s/p transverse colon conduit and B/L nephrostomy tubes complicated by MDR infections who presents to the ED complaining of R sided flank pain since last night.  She is scheduled for R PCNL (new access with IR), R antegrade URS, stone basket extraction planned for mid ureteral stone on 4/21.   - ID following, ucx growing enterococcus, blood cx neg  - currently on vanc/ertapenem   - NPO at midnight for R PCNL 4/21  - PRN antiemetics, MM pain control   - rest of care per primary

## 2023-04-20 NOTE — PLAN OF CARE
Chart reviewed.    Plan for ureteral stone extraction tomorrow.   Continue vanc and erta for now  Will f/u final urine cx results.    ID will follow.  Michelle Mohamud MD  Infectious Disease

## 2023-04-20 NOTE — PROGRESS NOTES
[facility]  Urology  Progress Note    Patient Name: Edita Riley  MRN: [id number]  Admission Date: 4/16/2023  Hospital Length of Stay: 2 days  Code Status: Full Code   Attending Provider: Lucero Bah MD   Primary Care Physician: Primary Doctor No    Subjective:     HPI:  Edita Riley is a 57 yo F with PMHx distal ureteral strictures s/p transverse colon conduit and B/L nephrostomy tubes complicated by MDR infections who presents to the ED complaining of R sided flank pain since last night.     She has a history of distal ureteral strictures due to XRT for cervical cancer.  She underwent a transverse colon conduit on 9/11/2020.  The patient had bilateral hydronephrosis with reflux on the right side and a sluggish ureter on the left side. Bilateral PCNTs since Apr 2021.  She underwent a percutaneous antegrade approach on 10/27/2022 for ureteral stones.     She had a CT scan done on one of her ER visits on 12/2/2022 which showed a 2 mm stone in the right ureter and a 6 mm stone in the lower pole.  The left NT was accidentally removed on 2/13/2023 and it was replaced on 2/14/2023.  The right was replaced at the same time, per IR's note. Return visit to the ED 3/1/23 for dislodged L NT which was replaced on 3/2/23.  CT shows bilateral nephrostomy tubes in correct anatomic position, new onset right sided hydronephrosis, no hydro on the left, 4mm stone in the mid right ureter with proximal hydroureter, small lower pole renal stones in the right kidney.     She was recently seen in urology clinic and noted to have worsening R sided hydronephrosis with recent CT revealing Right NT appearing to be bypassing the renal parenchyma and directly entering the renal pelvis.  She also has a 4 mm R mid ureteral stone. She was initially planned to be direct admitted tomorrow for IV abx prior to R PCNL (new access with IR), R antegrade URS, stone basket extraction planned for 4/21.  Bilateral PCNT last  exchanged on 3/28.      On assessment, she is AFVSS.  She denies fevers, chills.  Since admission overnight, R NT w/ 90 cc of output and urostomy with 300 cc of output.  WBC 8, Cr 1.1 at baseline.  UA significant for 38 RBCs, 63 WBCs, moderate bacteria.  Blood cx and urine cx in process.  She is currently on vanc/ertapenem and ID has been consulted.                     Interval History: NAEO.  AFVSS.  On vanc/erta, ID following.  Blood cx NGTD.  Ucx growing enterococcus.        R /400/250  L NT  -/400/300  Urostomy 160/50/100    Review of Systems: per HPI   Objective:     Temp:  [97.1 °F (36.2 °C)-98.2 °F (36.8 °C)] 97.5 °F (36.4 °C)  Pulse:  [69-79] 71  Resp:  [16-20] 16  SpO2:  [94 %-100 %] 97 %  BP: (104-114)/(53-61) 109/55     Body mass index is 29.17 kg/m².           Drains       Drain  Duration                  Urostomy 09/11/20 0000 ileal conduit  days         Ileostomy 09/18/20  days         Nephrostomy 02/14/23 Right 63 days         Nephrostomy 03/28/23 0953 Left 12 Fr. 20 days         Nephrostomy 03/28/23 0953 Right 12 Fr. 20 days                    Physical Exam  Constitutional:       Appearance: She is not toxic-appearing.   HENT:      Head: Normocephalic.   Cardiovascular:      Rate and Rhythm: Normal rate.   Pulmonary:      Effort: Pulmonary effort is normal. No respiratory distress.   Abdominal:      General: Abdomen is flat. There is no distension.      Palpations: Abdomen is soft.      Comments: Urostomy draining c/y/u.  R NT draining yellow urine - clearing.  L NT draining clear yellow urine - clearing.     Musculoskeletal:         General: No swelling or deformity. Normal range of motion.      Cervical back: Normal range of motion.   Skin:     General: Skin is warm and dry.      Findings: No erythema.   Neurological:      General: No focal deficit present.      Mental Status: She is alert.      Motor: No weakness.   Psychiatric:         Mood and Affect: Mood normal.          Behavior: Behavior normal.       Significant Labs:    BMP:  Recent Labs   Lab 04/16/23  1741 04/17/23  0357    137   K 4.4 3.9    110   CO2 18* 18*   BUN 16 14   CREATININE 1.1 1.1   CALCIUM 9.3 9.4         CBC:   Recent Labs   Lab 04/16/23  1621 04/17/23  0357   WBC 11.54 8.21   HGB 13.1 11.7*   HCT 44.1 39.0    231         Blood Culture:   Recent Labs   Lab 04/16/23  1602 04/16/23  1621   LABBLOO No Growth to date  No Growth to date  No Growth to date  No Growth to date No Growth to date  No Growth to date  No Growth to date  No Growth to date       Urine Culture:   Recent Labs   Lab 04/16/23  1746 04/18/23  0629   LABURIN ENTEROCOCCUS FAECALIS  >100,000 cfu/ml  * ENTEROCOCCUS SPECIES  50,000 - 99,999 cfu/ml  Identification and susceptibility pending  *       Urine Studies:   Recent Labs   Lab 04/16/23 1746   COLORU Yellow   APPEARANCEUA Hazy*   PHUR 6.0   SPECGRAV 1.015   PROTEINUA 1+*   GLUCUA Negative   KETONESU Negative   BILIRUBINUA Negative   OCCULTUA 1+*   NITRITE Negative   LEUKOCYTESUR 3+*   RBCUA 38*   WBCUA 63*   BACTERIA Moderate*   HYALINECASTS 0       All pertinent labs results from the past 24 hours have been reviewed.  Recent Lab Results       None            Significant Imaging:  All pertinent imaging results/findings from the past 24 hours have been reviewed.                    Assessment/Plan:     Bilateral ureteral obstruction  Edita Riley is a 57 yo F with PMHx distal ureteral strictures 2/2 XRT for cervical cancer, s/p transverse colon conduit and B/L nephrostomy tubes complicated by MDR infections who presents to the ED complaining of R sided flank pain since last night.  She is scheduled for R PCNL (new access with IR), R antegrade URS, stone basket extraction planned for mid ureteral stone on 4/21.   - ID following, ucx growing enterococcus, blood cx neg  - currently on vanc/ertapenem   - NPO at midnight for R PCNL 4/21  - PRN antiemetics, MM pain  control   - rest of care per primary            VTE Risk Mitigation (From admission, onward)         Ordered     enoxaparin injection 40 mg  Daily         04/16/23 1930     IP VTE HIGH RISK PATIENT  Once         04/16/23 1930     Place sequential compression device  Until discontinued         04/16/23 1930                Mana Wilks MD  Urology  [facility] - Surgery

## 2023-04-20 NOTE — ANESTHESIA PREPROCEDURE EVALUATION
[facility]  Anesthesia Pre-Operative Evaluation         Patient Name: Edita Riley  YOB: 1965  MRN: [id number]    SUBJECTIVE:     Pre-operative evaluation for Procedure(s) (LRB):  NEPHROLITHOTOMY, PERCUTANEOUS (Right)     04/20/2023    Edita Riley is a 58 y.o. female w/ a significant PMHx of Difficult intubation (most recent airway unremarkable), Anxiety/MDD, COPD, PTSD, CKD3, GERD, Hiatal hernia, HTN, Anemia, CVA, Arthritis, metastatic cervical cancer s/p XRT c/b distal ureteral strictures s/p transverse colon conduit and B/L nephrostomy tubes complicated by MDR infections. Admitted with b/l ureteral obstructions.    Patient now presents for the above procedure(s).    TTE 10/20/22:   The left ventricle is normal in size with mild concentric hypertrophy and normal systolic function.   The estimated ejection fraction is 70%.   Normal left ventricular diastolic function.   Normal right ventricular size with normal right ventricular systolic function.   Mild mitral regurgitation.   Normal central venous pressure (3 mmHg).   The estimated PA systolic pressure is 16 mmHg.         LDA:       Peripheral IV - Single Lumen 04/16/23 1600 Anterior;Right Forearm (Active)   Site Assessment Dry;Intact 04/19/23 2051   Line Status Saline locked 04/19/23 2051   Dressing Status Dry;Intact 04/19/23 2051   Dressing Intervention Integrity maintained 04/19/23 2051   Number of days: 3            Nephrostomy 02/14/23 Right (Active)   Urine Characteristics cloudy;mucous threads;yellow 04/19/23 2051   Clamp Status unclamped 04/17/23 0900   Dressing dry 04/17/23 1935   Collection Container Belly bag 04/18/23 0627   Output (mL) 250 mL 04/20/23 0646   Number of days: 65            Nephrostomy 03/28/23 0953 Right 12 Fr. (Active)   Number of days: 22            Nephrostomy 03/28/23 0953 Left 12 Fr. (Active)   Output (mL) 300 mL 04/20/23 0646   Number of days: 22             Ileostomy 09/18/20 RLQ (Active)   Stoma Appearance moist;pink 04/19/23 2051   Appliance 1-piece 04/19/23 2051   Treatment Site care 04/17/23 1935   Stoma Function stool 04/19/23 2051   Peristomal Assessment Red;Excoriation 04/19/23 2051   Output (mL) 200 mL 04/19/23 0548   Number of days: 944            Urostomy 09/11/20 0000 ileal conduit LLQ (Active)   Stoma Appearance pink;round 04/17/23 1935   Stomal Appliance 1 piece 04/17/23 2000   Stoma Function with sediment;yellow urine 04/17/23 1935   Treatment Bag change 04/17/23 2322   Output (mL) 100 mL 04/20/23 0646   Number of days: 951       Prev airway:   Intubation     Date/Time: 10/27/2022 7:15 AM  Performed by: Cyndee Smyth CRNA  Authorized by: Zenon Hernandez MD      Intubation:     Induction:  Intravenous    Intubated:  Postinduction    Mask Ventilation:  Easy mask    Attempts:  1    Attempted By:  CRNA    Method of Intubation:  Video laryngoscopy    Blade:  Martinez 3    Laryngeal View Grade: Grade I - full view of cords      Difficult Airway Encountered?: No      Complications:  None    Airway Device:  Oral endotracheal tube    Airway Device Size:  7.0    Style/Cuff Inflation:  Cuffed    Tube secured:  23    Secured at:  The lips    Placement Verified By:  Capnometry    Complicating Factors:  Anterior larynx    Findings Post-Intubation:  BS equal bilateral and atraumatic/condition of teeth unchanged    Intubation  Performed by: Stephanie Douglass CRNA  Authorized by: Guerda Santos MD      Intubation:     Induction:  Intravenous    Intubated:  Postinduction    Mask Ventilation:  Easy mask    Attempts:  2    Attempted By:  CRNA    Method of Intubation:  Video laryngoscopy    Blade:  Patricia 3    Laryngeal View Grade: Grade I - full view of chords      Laryngeal View Grade comment:  Unable to advance tube past aretenoids and unable to pass eschmaan past aretenoids    Attempted By (2nd Attempt):  Staff anesthesiologist    Method of Intubation (2nd  Attempt):  Video laryngoscopy    Laryngeal View Grade (2nd Attempt): Grade I - full view of cords      Laryngeal View Grade (2nd Attempt) comment:  Difficulty passing the ett,evenutally obtained with cricoid pressure and repositioning    Difficult Airway Encountered?: Yes      Complications:  None    Airway Device:  Oral endotracheal tube    Airway Device Size:  7.0    Style/Cuff Inflation:  Cuffed    Tube secured:  24    Placement Verified By:  Capnometry    Complicating Factors:  Anterior larynx    Findings Post-Intubation:  BS equal bilateral       Drips: None documented.      Patient Active Problem List   Diagnosis    Cervical cancer    Osteoarthritis of back    History of essential hypertension    Lower extremity pain, diffuse    Weakness of both lower extremities    Impaired functional mobility, balance, gait, and endurance    Colon polyp    Internal hemorrhoids    Severe episode of recurrent major depressive disorder, with psychotic features    Fibromyalgia    Carpal tunnel syndrome on right    History of cervical cancer    Metastatic squamous cell carcinoma to lymph node    Generalized anxiety disorder    PTSD (post-traumatic stress disorder)    Neuropathy due to chemotherapeutic drug    Seizure-like activity    Anemia due to chronic kidney disease    Hydronephrosis    Family history of colon cancer    Abnormal mammogram    Radiation cystitis    Bilateral ureteral obstruction    Moderate malnutrition    Vitamin D deficiency    Ileostomy in place    History of DVT (deep vein thrombosis)    Presence of urostomy    QT prolongation    Metabolic acidosis    Hard to intubate    Moderate episode of recurrent major depressive disorder    Nephrostomy status    Depression    Physical deconditioning    Urinary tract infection associated with nephrostomy catheter    Preprocedural cardiovascular examination    CKD (chronic kidney disease), stage III    Migraine without aura and  without status migrainosus, not intractable    Arthritis    Emphysema of lung    Edema    Refusal of blood transfusions as patient is Yazdanism       Review of patient's allergies indicates:   Allergen Reactions    Bee sting [allergen ext-venom-honey bee]      Rash      Grass pollen-bermuda, standard      rash       Current Inpatient Medications:      No current facility-administered medications on file prior to encounter.     Current Outpatient Medications on File Prior to Encounter   Medication Sig Dispense Refill    gabapentin (NEURONTIN) 100 MG capsule Take 2 capsules (200 mg total) by mouth every evening. 90 capsule 3    ketorolac (TORADOL) 10 mg tablet Take 1 tablet (10 mg total) by mouth every 6 (six) hours as needed for Pain. 30 tablet 0    melatonin (MELATIN) 3 mg tablet Take 2 tablets (6 mg total) by mouth nightly as needed for Insomnia. 60 tablet 0    mirtazapine (REMERON SOL-TAB) 15 MG disintegrating tablet Dissolve 1 tablet (15 mg total) by mouth nightly. 30 tablet 11    sodium bicarbonate 650 MG tablet Take 2 tablets (1,300 mg total) by mouth 2 (two) times daily. 120 tablet 11    sucralfate (CARAFATE) 1 gram tablet Take 1 tablet (1 g total) by mouth 4 (four) times daily before meals and nightly. 120 tablet 2    traMADoL (ULTRAM) 50 mg tablet Take 1 tablet (50 mg total) by mouth every 6 (six) hours. 5 tablet 0       Past Surgical History:   Procedure Laterality Date    ANTEGRADE NEPHROSTOGRAPHY Bilateral 9/28/2020    Procedure: Nephrostogram - antegrade;  Surgeon: Leslie Balbuena MD;  Location: [address];  Service: Urology;  Laterality: Bilateral;    ANTEGRADE NEPHROSTOGRAPHY Left 4/20/2021    Procedure: NEPHROSTOGRAM, ANTEGRADE;  Surgeon: Leslie Balbuena MD;  Location: [address];  Service: Urology;  Laterality: Left;    ANTEGRADE NEPHROSTOGRAPHY Right 10/27/2022    Procedure: NEPHROSTOGRAM, ANTEGRADE;  Surgeon: Chirag Russ MD;  Location: [facility] OR [address] [place] [address];   Service: Urology;  Laterality: Right;    BILATERAL OOPHORECTOMY  2015    CHOLECYSTECTOMY  11/09/2016    Done at [facility], path showed chronic cholecystitis and gallstones    cold knife conization  2014    COLECTOMY, RIGHT  9/17/2020    Procedure: COLECTOMY, RIGHT Extended;  Surgeon: Hammad Reynolds MD;  Location: [facility] OR 2ND FLR;  Service: Colon and Rectal;;    COLONOSCOPY  2014    [facility]    COLONOSCOPY N/A 5/18/2018    Procedure: COLONOSCOPY;  Surgeon: Arden Gutiérrez MD;  Location: [facility] (4TH FLR);  Service: Endoscopy;  Laterality: N/A;    COLONOSCOPY N/A 7/28/2020    Procedure: COLONOSCOPY;  Surgeon: Hammad Reynolds MD;  Location: [facility] (4TH FLR);  Service: Colon and Rectal;  Laterality: N/A;  covid test [place] 7/25    CYSTOSCOPY WITH URETEROSCOPY, RETROGRADE PYELOGRAPHY, AND INSERTION OF STENT Bilateral 3/21/2020    Procedure: CYSTOSCOPY, WITH RETROGRADE PYELOGRAM,;  Surgeon: Leslie Balbuena MD;  Location: [facility] OR 1ST FLR;  Service: Urology;  Laterality: Bilateral;    DILATION OF NEPHROSTOMY TRACT Right 10/27/2022    Procedure: DILATION, NEPHROSTOMY TRACT;  Surgeon: Chirag Russ MD;  Location: [facility] OR 1ST FLR;  Service: Urology;  Laterality: Right;    ESOPHAGOGASTRODUODENOSCOPY  2014    ESOPHAGOGASTRODUODENOSCOPY  11/18/2020    ESOPHAGOGASTRODUODENOSCOPY N/A 11/18/2020    Procedure: ESOPHAGOGASTRODUODENOSCOPY (EGD);  Surgeon: Zenon Spencer MD;  Location: [facility];  Service: Endoscopy;  Laterality: N/A;    ESOPHAGOGASTRODUODENOSCOPY N/A 12/11/2020    Procedure: EGD (ESOPHAGOGASTRODUODENOSCOPY);  Surgeon: Juancho Muse MD;  Location: [facility] (2ND FLR);  Service: Endoscopy;  Laterality: N/A;    HYSTERECTOMY  2014    [facility] for cervical [facility]    ILEOSTOMY  9/17/2020    Procedure: CREATION, ILEOSTOMY;  Surgeon: Hammad Reynolds MD;  Location: [facility] OR 2ND FLR;  Service: Colon and Rectal;;    LOOPOGRAM N/A 4/20/2021    Procedure:  LOOPOGRAM;  Surgeon: Leslie Balbuena MD;  Location: [facility] OR 1ST FLR;  Service: Urology;  Laterality: N/A;    MOBILIZATION OF SPLENIC FLEXURE N/A 2020    Procedure: MOBILIZATION, COLONIC;  Surgeon: Hammad Reynolds MD;  Location: [facility] OR 2ND FLR;  Service: Colon and Rectal;  Laterality: N/A;    NEPHROSTOGRAPHY Bilateral 2021    Procedure: Nephrostogram;  Surgeon: Celeste Surgeon;  Location: [facility];  Service: Anesthesiology;  Laterality: Bilateral;    OOPHORECTOMY      PYELOSCOPY Right 10/27/2022    Procedure: PYELOSCOPY;  Surgeon: Chirag Russ MD;  Location: [facility] OR 1ST FLR;  Service: Urology;  Laterality: Right;    REPLACEMENT OF NEPHROSTOMY TUBE Right 10/27/2022    Procedure: REPLACEMENT, NEPHROSTOMY TUBE;  Surgeon: Chirag Russ MD;  Location: [facility] OR [facility] FLR;  Service: Urology;  Laterality: Right;    RETROPERITONEAL LYMPHADENECTOMY Right 2018    Procedure: LYMPHADENECTOMY, RETROPERITONEUM;  Surgeon: Sebas Reed MD;  Location: [facility] OR 2ND FLR;  Service: General;  Laterality: Right;  ILIAC    URETEROSCOPIC REMOVAL OF URETERIC CALCULUS Right 10/27/2022    Procedure: REMOVAL, CALCULUS, URETER, URETEROSCOPIC;  Surgeon: Chirag Russ MD;  [facility];  Service: Urology;  Laterality: Right;    URETEROSCOPY Right 10/27/2022    Procedure: URETEROSCOPY-ANTEGRADE;  Surgeon: Chirag Russ MD;  [facility];  Service: Urology;  Laterality: Right;       Social History     Socioeconomic History    Marital status:      Spouse name: Hammad    Number of children: 2   Tobacco Use    Smoking status: Never    Smokeless tobacco: Never   Substance and Sexual Activity    Alcohol use: No     Alcohol/week: 0.0 standard drinks    Drug use: No    Sexual activity: Yes     Partners: Male     Birth control/protection: None     Comment:  19 years since    Social History Narrative    , twin daughters (1  2018), disabled due  to childhood stroke, [employer]     Social Determinants of Health     Financial Resource Strain: Low Risk     Difficulty of Paying Living Expenses: Not very hard   Food Insecurity: No Food Insecurity    Worried About Running Out of Food in the Last Year: Never true    Ran Out of Food in the Last Year: Never true   Transportation Needs: No Transportation Needs    Lack of Transportation (Medical): No    Lack of Transportation (Non-Medical): No   Physical Activity: Inactive    Days of Exercise per Week: 0 days    Minutes of Exercise per Session: 0 min   Stress: No Stress Concern Present    Feeling of Stress : Only a little   Social Connections: Moderately Isolated    Frequency of Communication with Friends and Family: More than three times a week    Frequency of Social Gatherings with Friends and Family: More than three times a week    Attends Hoahaoism Services: Never    Active Member of Clubs or Organizations: No    Attends Club or Organization Meetings: Never    Marital Status:    Housing Stability: Low Risk     Unable to Pay for Housing in the Last Year: No    Number of Places Lived in the Last Year: 1    Unstable Housing in the Last Year: No       OBJECTIVE:     Vital Signs Range (Last 24H):  Temp:  [36.2 °C (97.1 °F)-36.8 °C (98.2 °F)]   Pulse:  [69-79]   Resp:  [16-20]   BP: (104-114)/(53-61)   SpO2:  [96 %-100 %]       Significant Labs:  Lab Results   Component Value Date    WBC 8.21 04/17/2023    HGB 11.7 (L) 04/17/2023    HCT 39.0 04/17/2023     04/17/2023    CHOL 186 10/20/2022    TRIG 86 10/20/2022    HDL 77 (H) 10/20/2022    ALT 12 04/17/2023    AST 19 04/17/2023     04/17/2023    K 3.9 04/17/2023     04/17/2023    CREATININE 1.1 04/17/2023    BUN 14 04/17/2023    CO2 18 (L) 04/17/2023    TSH 0.527 12/02/2018    INR 1.0 03/01/2023    GLUF 94 09/20/2016    HGBA1C 4.9 02/04/2021       Diagnostic Studies: No relevant studies.    EKG:   Results for orders  placed or performed in visit on 04/04/23   IN OFFICE EKG 12-LEAD (to [place])    Collection Time: 04/04/23  8:50 AM    Narrative    Test Reason : Z01.810,    Vent. Rate : 065 BPM     Atrial Rate : 065 BPM     P-R Int : 156 ms          QRS Dur : 080 ms      QT Int : 420 ms       P-R-T Axes : 086 087 085 degrees     QTc Int : 436 ms    Normal sinus rhythm  Normal ECG  When compared with ECG of 04-JAN-2023 18:25,  Questionable change in The axis  Confirmed by Meir Haynes MD ([id number]) on 4/5/2023 4:48:07 PM    Referred By:  BERNA SUH           Confirmed By:Meir Haynes MD       2D ECHO:  TTE:  Results for orders placed or performed during the hospital encounter of 10/20/22   Echo   Result Value Ref Range    TDI SEPTAL 0.12 m/s    LV LATERAL E/E' RATIO 5.00 m/s    LV SEPTAL E/E' RATIO 5.83 m/s    IVC diameter 1.26 cm    Left Ventricular Outflow Tract Mean Velocity 0.65 cm/s    Left Ventricular Outflow Tract Mean Gradient 1.97 mmHg    AORTIC VALVE CUSP SEPERATION 1.70 cm    TDI LATERAL 0.14 m/s    PV PEAK VELOCITY 1.12 cm/s    LVIDd 3.58 3.5 - 6.0 cm    IVS 1.06 0.6 - 1.1 cm    Posterior Wall 1.06 0.6 - 1.1 cm    Ao root annulus 2.34 cm    LVIDs 2.03 (A) 2.1 - 4.0 cm    FS 43 28 - 44 %    Sinus 1.74 cm    LV mass 116.51 g    RVDD 2.29 cm    Left Ventricle Relative Wall Thickness 0.59 cm    AV mean gradient 3 mmHg    AV valve area 1.30 cm2    AV Velocity Ratio 0.78     AV index (prosthetic) 0.81     MV valve area p 1/2 method 4.92 cm2    E/A ratio 1.01     Mean e' 0.13 m/s    E wave deceleration time 154.78 msec    IVRT 68.51 msec    LVOT diameter 1.43 cm    LVOT area 1.6 cm2    LVOT peak melissa 0.97 m/s    LVOT peak VTI 21.70 cm    Ao peak melissa 1.25 m/s    Ao VTI 26.7 cm    LVOT stroke volume 34.83 cm3    AV peak gradient 6 mmHg    E/E' ratio 5.38 m/s    MV Peak E Melissa 0.70 m/s    TR Max Melissa 1.77 m/s    MV stenosis pressure 1/2 time 44.69 ms    MV Peak A Melissa 0.69 m/s    LV Systolic Volume 13.26 mL    LV Diastolic Volume  53.67 mL    RA Major Axis 3.68 cm    Left Atrium Minor Axis 2.34 cm    Left Atrium Major Axis 4.47 cm    Triscuspid Valve Regurgitation Peak Gradient 13 mmHg    LA volume (mod) 31.47 cm3    RA Width 2.79 cm    Right Atrial Pressure (from IVC) 3 mmHg    EF 70 %    TV rest pulmonary artery pressure 16 mmHg    Narrative    · The left ventricle is normal in size with mild concentric hypertrophy   and normal systolic function.  · The estimated ejection fraction is 70%.  · Normal left ventricular diastolic function.  · Normal right ventricular size with normal right ventricular systolic   function.  · Mild mitral regurgitation.  · Normal central venous pressure (3 mmHg).  · The estimated PA systolic pressure is 16 mmHg.          VALERIA:  No results found. However, due to the size of the patient record, not all encounters were searched. Please check Results Review for a complete set of results.    ASSESSMENT/PLAN:                                                                                                                  04/20/2023  Edita Riley is a 58 y.o., female.      Pre-op Assessment    I have reviewed the Patient Summary Reports.     I have reviewed the Nursing Notes. I have reviewed the NPO Status.   I have reviewed the Medications.     Review of Systems  Anesthesia Hx:  Refusal of blood transfusions as patient is Sabianism    HX Difficult intubation  10/27/22 VIDEO LARYNGOSCOPE History of prior surgery of interest to airway management or planning: Previous anesthesia: General 10/27/2022 URETEROSCOPY-ANTEGRADE (Right: Ureter) with general anesthesia.  Procedure performed at an [facility]. Airway issues documented on chart review include videolaryngoscope used  Denies Family Hx of Anesthesia complications.   Denies Personal Hx of Anesthesia complications.   Social:  Non-Smoker, No Alcohol Use    Hematology/Oncology:         -- Anemia: Hematology Comments: Refusal of blood transfusions as  patient is Cheondoism  --  Cancer in past history:  Oncology Comments:   10/11/2018  Metastatic squamous cell carcinoma to lymph node  10/11/2018  History of cervical cancer     EENT/Dental:EENT/Dental Normal   Cardiovascular:   Hypertension, well controlled  Denies Angina. no hyperlipidemia MUNGUIA 12/09/2020  Acute deep vein thrombosis (DVT) of lower extremity Functional Capacity 4 METS    Pulmonary:   COPD Shortness of breath Denies Recent URI.  Denies Sleep Apnea.    Renal/:   Chronic Renal Disease, CKD renal calculi RIGHT URETERAL STONE; RIGHT RENAL STONE    Radiation cystitis    Nephrostomy status  10/27/2022  Ureteroscopy (Right)   10/27/2022  Antegrade nephrostography (Right)   10/27/2022  Ureteroscopic removal of ureteric calculus (Right)   04/21/2021  Pseudomonas urinary tract infection  11/04/2020  DVT of lower extremity, bilateral  09/27/2020  Fungemia  09/24/2020  Wound infection after surgery  09/22/2020  Peritonitis  9/11/2020  Bilateral ureteral obstruction  08/25/2020  Generalized abdominal pain  06/11/2020  Urinary tract infection associated with nephrostomy catheter  11/14/2018  Neuropathy due to chemotherapeutic drug Kidney Function/Disease, Chronic Kidney Disease (CKD) , CKD Stage III (GFR 30-59)    Hepatic/GI:   Denies PUD. Hiatal Hernia, GERD Denies Liver Disease. Denies Hepatitis.   9/11/2020 COLOSTOMY-Ileostomy in place    11/14/2018  Diarrhea due to malabsorption    11/09/2016  Cholecystectomy Esophageal / Stomach Disorders (9/14/2015 SCHATZKI'S RING ON OUTSIDE EGD 5/2014, UNDERWENT ESOPHAGEAL DILATATION BX NEGATIVE)  Hernia, Hiatal Hernia   Musculoskeletal:   Arthritis   Musculoskeletal General/Symptoms: muscle weakness, localized, muscle weakness, generalized.  Joint Disease:  Arthritis, Osteoarthritis    OB/GYN/PEDS:  2015  Bilateral oophorectomy  2014  Hysterectomy    Neurological:   CVA Neuromuscular Disease, Headaches Seizures, well controlled  Neuro Symptoms Dx of Headaches,  Migraine Headache Pain  Pain Etiology/Diagnosis, Arthritis, Osteoarthritis, Fibromayalgia  Neuromuscular Disease   Endocrine:   Denies Diabetes. Denies Hypothyroidism.  Metabolic Disorders, Malnutrition (MODERATE MALNUTRITION; VITAMIN D DEFICIENCY), Metabolic Syndrome  Dermatological:     9/24/2020 WOUND INFECTION POST SURGERY    9/22/2020 HX PERITONITIS   Psych:   Psychiatric History anxiety depression Severe episode of recurrent major depressive disorder, with psychotic features    Generalized anxiety disorder    PTSD (post-traumatic stress disorder)    Depression  Moderate episode of recurrent major depressive disorder         Physical Exam  General: Well nourished, Cooperative, Oriented and Alert    Airway:  Mallampati: II   Mouth Opening: Normal  TM Distance: Normal  Tongue: Normal  Neck ROM: Normal ROM    Dental:  Intact, Periodontal disease        Anesthesia Plan  Type of Anesthesia, risks & benefits discussed:    Anesthesia Type: Gen ETT, Regional, Gen Natural Airway, MAC  Intra-op Monitoring Plan: Standard ASA Monitors  Post Op Pain Control Plan: multimodal analgesia and IV/PO Opioids PRN  Induction:  IV  Airway Plan: Direct and Video, Post-Induction  Informed Consent: Informed consent signed with the Patient and all parties understand the risks and agree with anesthesia plan.  All questions answered.   ASA Score: 3  Day of Surgery Review of History & Physical: H&P Update referred to the surgeon/provider.    Ready For Surgery From Anesthesia Perspective.     .

## 2023-04-20 NOTE — PLAN OF CARE
Problem: Adult Inpatient Plan of Care  Goal: Plan of Care Review  Outcome: Ongoing, Progressing  Goal: Absence of Hospital-Acquired Illness or Injury  Outcome: Ongoing, Progressing  Goal: Optimal Comfort and Wellbeing  Outcome: Ongoing, Progressing     Problem: Infection  Goal: Absence of Infection Signs and Symptoms  Outcome: Ongoing, Progressing

## 2023-04-20 NOTE — SUBJECTIVE & OBJECTIVE
Interval History: NAEO.  AFVSS.  On vanc/erta, ID following.  Blood cx NGTD.  Ucx growing enterococcus.        R /400/250  L NT  -/400/300  Urostomy 160/50/100    Review of Systems: per HPI   Objective:     Temp:  [97.1 °F (36.2 °C)-98.2 °F (36.8 °C)] 97.5 °F (36.4 °C)  Pulse:  [69-79] 71  Resp:  [16-20] 16  SpO2:  [94 %-100 %] 97 %  BP: (104-114)/(53-61) 109/55     Body mass index is 29.17 kg/m².           Drains       Drain  Duration                  Urostomy 09/11/20 0000 ileal conduit  days         Ileostomy 09/18/20  days         Nephrostomy 02/14/23 Right 63 days         Nephrostomy 03/28/23 0953 Left 12 Fr. 20 days         Nephrostomy 03/28/23 0953 Right 12 Fr. 20 days                    Physical Exam  Constitutional:       Appearance: She is not toxic-appearing.   HENT:      Head: Normocephalic.   Cardiovascular:      Rate and Rhythm: Normal rate.   Pulmonary:      Effort: Pulmonary effort is normal. No respiratory distress.   Abdominal:      General: Abdomen is flat. There is no distension.      Palpations: Abdomen is soft.      Comments: Urostomy draining c/y/u.  R NT draining yellow urine - clearing.  L NT draining clear yellow urine - clearing.     Musculoskeletal:         General: No swelling or deformity. Normal range of motion.      Cervical back: Normal range of motion.   Skin:     General: Skin is warm and dry.      Findings: No erythema.   Neurological:      General: No focal deficit present.      Mental Status: She is alert.      Motor: No weakness.   Psychiatric:         Mood and Affect: Mood normal.         Behavior: Behavior normal.       Significant Labs:    BMP:  Recent Labs   Lab 04/16/23  1741 04/17/23  0357    137   K 4.4 3.9    110   CO2 18* 18*   BUN 16 14   CREATININE 1.1 1.1   CALCIUM 9.3 9.4         CBC:   Recent Labs   Lab 04/16/23  1621 04/17/23  0357   WBC 11.54 8.21   HGB 13.1 11.7*   HCT 44.1 39.0    231         Blood Culture:   Recent Labs    Lab 04/16/23  1602 04/16/23  1621   LABBLOO No Growth to date  No Growth to date  No Growth to date  No Growth to date No Growth to date  No Growth to date  No Growth to date  No Growth to date       Urine Culture:   Recent Labs   Lab 04/16/23  1746 04/18/23  0629   LABURIN ENTEROCOCCUS FAECALIS  >100,000 cfu/ml  * ENTEROCOCCUS SPECIES  50,000 - 99,999 cfu/ml  Identification and susceptibility pending  *       Urine Studies:   Recent Labs   Lab 04/16/23 1746   COLORU Yellow   APPEARANCEUA Hazy*   PHUR 6.0   SPECGRAV 1.015   PROTEINUA 1+*   GLUCUA Negative   KETONESU Negative   BILIRUBINUA Negative   OCCULTUA 1+*   NITRITE Negative   LEUKOCYTESUR 3+*   RBCUA 38*   WBCUA 63*   BACTERIA Moderate*   HYALINECASTS 0       All pertinent labs results from the past 24 hours have been reviewed.  Recent Lab Results       None            Significant Imaging:  All pertinent imaging results/findings from the past 24 hours have been reviewed.

## 2023-04-21 ENCOUNTER — ANESTHESIA (OUTPATIENT)
Dept: SURGERY | Facility: HOSPITAL | Age: 58
DRG: 699 | End: 2023-04-21
Payer: MEDICAID

## 2023-04-21 LAB
BACTERIA BLD CULT: NORMAL
BACTERIA BLD CULT: NORMAL
BACTERIA UR CULT: ABNORMAL

## 2023-04-21 PROCEDURE — 36000708 HC OR TIME LEV III 1ST 15 MIN: Performed by: UROLOGY

## 2023-04-21 PROCEDURE — C1747 OPTIME ENDOSCOPE, SINGLE, URINARY TRACT: HCPCS | Performed by: UROLOGY

## 2023-04-21 PROCEDURE — 25000003 PHARM REV CODE 250: Performed by: ANESTHESIOLOGY

## 2023-04-21 PROCEDURE — 36000709 HC OR TIME LEV III EA ADD 15 MIN: Performed by: UROLOGY

## 2023-04-21 PROCEDURE — 50080 PR PERCUT REMV KID STONE,UP TO 2 CM: ICD-10-PCS | Mod: RT,,, | Performed by: UROLOGY

## 2023-04-21 PROCEDURE — 64999 UNLISTED PX NERVOUS SYSTEM: CPT | Mod: ,,, | Performed by: ANESTHESIOLOGY

## 2023-04-21 PROCEDURE — 63600175 PHARM REV CODE 636 W HCPCS: Performed by: INTERNAL MEDICINE

## 2023-04-21 PROCEDURE — 71000033 HC RECOVERY, INTIAL HOUR: Performed by: UROLOGY

## 2023-04-21 PROCEDURE — 82365 CALCULUS SPECTROSCOPY: CPT | Performed by: UROLOGY

## 2023-04-21 PROCEDURE — 76942 ECHO GUIDE FOR BIOPSY: CPT | Mod: 26,,, | Performed by: ANESTHESIOLOGY

## 2023-04-21 PROCEDURE — 25500020 PHARM REV CODE 255: Performed by: UROLOGY

## 2023-04-21 PROCEDURE — 99233 SBSQ HOSP IP/OBS HIGH 50: CPT | Mod: ,,, | Performed by: INTERNAL MEDICINE

## 2023-04-21 PROCEDURE — 71000016 HC POSTOP RECOV ADDL HR: Performed by: UROLOGY

## 2023-04-21 PROCEDURE — C1729 CATH, DRAINAGE: HCPCS | Performed by: UROLOGY

## 2023-04-21 PROCEDURE — 76942 ECHO GUIDE FOR BIOPSY: CPT

## 2023-04-21 PROCEDURE — D9220A PRA ANESTHESIA: Mod: ANES,,, | Performed by: ANESTHESIOLOGY

## 2023-04-21 PROCEDURE — 63600175 PHARM REV CODE 636 W HCPCS: Performed by: NURSE ANESTHETIST, CERTIFIED REGISTERED

## 2023-04-21 PROCEDURE — 99233 PR SUBSEQUENT HOSPITAL CARE,LEVL III: ICD-10-PCS | Mod: ,,, | Performed by: INTERNAL MEDICINE

## 2023-04-21 PROCEDURE — 25000003 PHARM REV CODE 250: Performed by: NURSE ANESTHETIST, CERTIFIED REGISTERED

## 2023-04-21 PROCEDURE — 76942 PR U/S GUIDANCE FOR NEEDLE GUIDANCE: ICD-10-PCS | Mod: 26,,, | Performed by: ANESTHESIOLOGY

## 2023-04-21 PROCEDURE — 63600175 PHARM REV CODE 636 W HCPCS: Performed by: HOSPITALIST

## 2023-04-21 PROCEDURE — 50080 PERQ NL/PL LITHOTRP SMPL<2CM: CPT | Mod: RT,,, | Performed by: UROLOGY

## 2023-04-21 PROCEDURE — D9220A PRA ANESTHESIA: Mod: CRNA,,, | Performed by: NURSE ANESTHETIST, CERTIFIED REGISTERED

## 2023-04-21 PROCEDURE — 27201423 OPTIME MED/SURG SUP & DEVICES STERILE SUPPLY: Performed by: UROLOGY

## 2023-04-21 PROCEDURE — D9220A PRA ANESTHESIA: ICD-10-PCS | Mod: ANES,,, | Performed by: ANESTHESIOLOGY

## 2023-04-21 PROCEDURE — 25000003 PHARM REV CODE 250: Performed by: HOSPITALIST

## 2023-04-21 PROCEDURE — 11000001 HC ACUTE MED/SURG PRIVATE ROOM

## 2023-04-21 PROCEDURE — 25000003 PHARM REV CODE 250: Performed by: INTERNAL MEDICINE

## 2023-04-21 PROCEDURE — 94761 N-INVAS EAR/PLS OXIMETRY MLT: CPT

## 2023-04-21 PROCEDURE — 37000008 HC ANESTHESIA 1ST 15 MINUTES: Performed by: UROLOGY

## 2023-04-21 PROCEDURE — 64999 RIGHT ESP SS: ICD-10-PCS | Mod: ,,, | Performed by: ANESTHESIOLOGY

## 2023-04-21 PROCEDURE — 27000207 HC ISOLATION

## 2023-04-21 PROCEDURE — D9220A PRA ANESTHESIA: ICD-10-PCS | Mod: CRNA,,, | Performed by: NURSE ANESTHETIST, CERTIFIED REGISTERED

## 2023-04-21 PROCEDURE — 37000009 HC ANESTHESIA EA ADD 15 MINS: Performed by: UROLOGY

## 2023-04-21 PROCEDURE — 71000015 HC POSTOP RECOV 1ST HR: Performed by: UROLOGY

## 2023-04-21 RX ORDER — LINEZOLID 600 MG/1
600 TABLET, FILM COATED ORAL EVERY 12 HOURS
Status: DISCONTINUED | OUTPATIENT
Start: 2023-04-21 | End: 2023-04-26 | Stop reason: HOSPADM

## 2023-04-21 RX ORDER — ERTAPENEM 1 G/1
INJECTION, POWDER, LYOPHILIZED, FOR SOLUTION INTRAMUSCULAR; INTRAVENOUS
Status: DISCONTINUED | OUTPATIENT
Start: 2023-04-21 | End: 2023-04-21

## 2023-04-21 RX ORDER — DROPERIDOL 2.5 MG/ML
0.62 INJECTION, SOLUTION INTRAMUSCULAR; INTRAVENOUS ONCE AS NEEDED
Status: DISCONTINUED | OUTPATIENT
Start: 2023-04-21 | End: 2023-04-21 | Stop reason: HOSPADM

## 2023-04-21 RX ORDER — PHENYLEPHRINE HCL IN 0.9% NACL 1 MG/10 ML
SYRINGE (ML) INTRAVENOUS
Status: DISCONTINUED | OUTPATIENT
Start: 2023-04-21 | End: 2023-04-21

## 2023-04-21 RX ORDER — VASOPRESSIN 20 [USP'U]/ML
INJECTION, SOLUTION INTRAMUSCULAR; SUBCUTANEOUS
Status: DISCONTINUED | OUTPATIENT
Start: 2023-04-21 | End: 2023-04-21

## 2023-04-21 RX ORDER — ONDANSETRON 2 MG/ML
4 INJECTION INTRAMUSCULAR; INTRAVENOUS ONCE AS NEEDED
Status: DISCONTINUED | OUTPATIENT
Start: 2023-04-21 | End: 2023-04-21 | Stop reason: HOSPADM

## 2023-04-21 RX ORDER — ENOXAPARIN SODIUM 100 MG/ML
40 INJECTION SUBCUTANEOUS EVERY 24 HOURS
Status: DISCONTINUED | OUTPATIENT
Start: 2023-04-21 | End: 2023-04-26 | Stop reason: HOSPADM

## 2023-04-21 RX ORDER — MIDAZOLAM HYDROCHLORIDE 1 MG/ML
.5-4 INJECTION INTRAMUSCULAR; INTRAVENOUS
Status: DISCONTINUED | OUTPATIENT
Start: 2023-04-21 | End: 2023-04-21 | Stop reason: HOSPADM

## 2023-04-21 RX ORDER — FENTANYL CITRATE 50 UG/ML
25-200 INJECTION, SOLUTION INTRAMUSCULAR; INTRAVENOUS
Status: DISCONTINUED | OUTPATIENT
Start: 2023-04-21 | End: 2023-04-21 | Stop reason: HOSPADM

## 2023-04-21 RX ORDER — ACETAMINOPHEN 10 MG/ML
INJECTION, SOLUTION INTRAVENOUS
Status: DISCONTINUED | OUTPATIENT
Start: 2023-04-21 | End: 2023-04-21

## 2023-04-21 RX ORDER — MIDAZOLAM HYDROCHLORIDE 1 MG/ML
INJECTION, SOLUTION INTRAMUSCULAR; INTRAVENOUS
Status: DISCONTINUED | OUTPATIENT
Start: 2023-04-21 | End: 2023-04-21

## 2023-04-21 RX ORDER — LIDOCAINE HYDROCHLORIDE 20 MG/ML
INJECTION, SOLUTION EPIDURAL; INFILTRATION; INTRACAUDAL; PERINEURAL
Status: DISCONTINUED | OUTPATIENT
Start: 2023-04-21 | End: 2023-04-21

## 2023-04-21 RX ORDER — ROCURONIUM BROMIDE 10 MG/ML
INJECTION, SOLUTION INTRAVENOUS
Status: DISCONTINUED | OUTPATIENT
Start: 2023-04-21 | End: 2023-04-21

## 2023-04-21 RX ORDER — BUPIVACAINE HYDROCHLORIDE 7.5 MG/ML
INJECTION, SOLUTION EPIDURAL; RETROBULBAR
Status: COMPLETED | OUTPATIENT
Start: 2023-04-21 | End: 2023-04-21

## 2023-04-21 RX ORDER — DEXAMETHASONE SODIUM PHOSPHATE 4 MG/ML
INJECTION, SOLUTION INTRA-ARTICULAR; INTRALESIONAL; INTRAMUSCULAR; INTRAVENOUS; SOFT TISSUE
Status: DISCONTINUED | OUTPATIENT
Start: 2023-04-21 | End: 2023-04-21

## 2023-04-21 RX ORDER — FENTANYL CITRATE 50 UG/ML
INJECTION, SOLUTION INTRAMUSCULAR; INTRAVENOUS
Status: DISCONTINUED | OUTPATIENT
Start: 2023-04-21 | End: 2023-04-21

## 2023-04-21 RX ORDER — HYDROMORPHONE HYDROCHLORIDE 1 MG/ML
0.2 INJECTION, SOLUTION INTRAMUSCULAR; INTRAVENOUS; SUBCUTANEOUS EVERY 5 MIN PRN
Status: DISCONTINUED | OUTPATIENT
Start: 2023-04-21 | End: 2023-04-21 | Stop reason: HOSPADM

## 2023-04-21 RX ORDER — PROPOFOL 10 MG/ML
VIAL (ML) INTRAVENOUS
Status: DISCONTINUED | OUTPATIENT
Start: 2023-04-21 | End: 2023-04-21

## 2023-04-21 RX ADMIN — PROPOFOL 50 MG: 10 INJECTION, EMULSION INTRAVENOUS at 12:04

## 2023-04-21 RX ADMIN — Medication 200 MCG: at 12:04

## 2023-04-21 RX ADMIN — LIDOCAINE HYDROCHLORIDE 100 MG: 20 INJECTION, SOLUTION EPIDURAL; INFILTRATION; INTRACAUDAL; PERINEURAL at 12:04

## 2023-04-21 RX ADMIN — ONDANSETRON 4 MG: 2 INJECTION INTRAMUSCULAR; INTRAVENOUS at 02:04

## 2023-04-21 RX ADMIN — SODIUM CHLORIDE, SODIUM ACETATE ANHYDROUS, SODIUM GLUCONATE, POTASSIUM CHLORIDE, AND MAGNESIUM CHLORIDE: 526; 222; 502; 37; 30 INJECTION, SOLUTION INTRAVENOUS at 02:04

## 2023-04-21 RX ADMIN — GLYCOPYRROLATE 0.2 MG: 0.2 INJECTION, SOLUTION INTRAMUSCULAR; INTRAVENOUS at 12:04

## 2023-04-21 RX ADMIN — GABAPENTIN 200 MG: 100 CAPSULE ORAL at 09:04

## 2023-04-21 RX ADMIN — ACETAMINOPHEN 1000 MG: 10 INJECTION INTRAVENOUS at 12:04

## 2023-04-21 RX ADMIN — FENTANYL CITRATE 100 MCG: 50 INJECTION, SOLUTION INTRAMUSCULAR; INTRAVENOUS at 12:04

## 2023-04-21 RX ADMIN — PROPOFOL 150 MG: 10 INJECTION, EMULSION INTRAVENOUS at 12:04

## 2023-04-21 RX ADMIN — TRAMADOL HYDROCHLORIDE 50 MG: 50 TABLET, COATED ORAL at 11:04

## 2023-04-21 RX ADMIN — BUPIVACAINE HYDROCHLORIDE 15 ML: 7.5 INJECTION, SOLUTION EPIDURAL; RETROBULBAR at 12:04

## 2023-04-21 RX ADMIN — ENOXAPARIN SODIUM 40 MG: 40 INJECTION SUBCUTANEOUS at 05:04

## 2023-04-21 RX ADMIN — SUGAMMADEX 200 MG: 100 INJECTION, SOLUTION INTRAVENOUS at 02:04

## 2023-04-21 RX ADMIN — SODIUM CHLORIDE: 0.9 INJECTION, SOLUTION INTRAVENOUS at 12:04

## 2023-04-21 RX ADMIN — ONDANSETRON 4 MG: 2 INJECTION INTRAMUSCULAR; INTRAVENOUS at 12:04

## 2023-04-21 RX ADMIN — VASOPRESSIN 2 UNITS: 20 INJECTION INTRAVENOUS at 02:04

## 2023-04-21 RX ADMIN — MIDAZOLAM HYDROCHLORIDE 2 MG: 2 INJECTION, SOLUTION INTRAMUSCULAR; INTRAVENOUS at 12:04

## 2023-04-21 RX ADMIN — ROCURONIUM BROMIDE 50 MG: 50 INJECTION, SOLUTION INTRAVENOUS at 12:04

## 2023-04-21 RX ADMIN — ERTAPENEM 1 G: 1 INJECTION INTRAMUSCULAR; INTRAVENOUS at 01:04

## 2023-04-21 RX ADMIN — VASOPRESSIN 2 UNITS: 20 INJECTION INTRAVENOUS at 01:04

## 2023-04-21 RX ADMIN — LINEZOLID 600 MG: 600 TABLET, FILM COATED ORAL at 05:04

## 2023-04-21 RX ADMIN — TRAMADOL HYDROCHLORIDE 50 MG: 50 TABLET, COATED ORAL at 12:04

## 2023-04-21 RX ADMIN — TRAMADOL HYDROCHLORIDE 50 MG: 50 TABLET, COATED ORAL at 06:04

## 2023-04-21 RX ADMIN — TRAMADOL HYDROCHLORIDE 50 MG: 50 TABLET, COATED ORAL at 05:04

## 2023-04-21 RX ADMIN — DEXAMETHASONE SODIUM PHOSPHATE 8 MG: 4 INJECTION, SOLUTION INTRAMUSCULAR; INTRAVENOUS at 12:04

## 2023-04-21 NOTE — H&P
Inpatient Radiology Pre-procedure Note    History of Present Illness:  Edita Riley is a 58 y.o. female with PMHx distal ureteral strictures due to xrt for cervical cancer s/p transverse colon conduit and B/L nephrostomy tubes complicated by MDR infections who presents to the ED complaining of R sided flank pain since last night. She has known urinary stones and presents for R nephrostomy tube access for PCNL.      Admission H&P reviewed.  Past Medical History:   Diagnosis Date    Abnormal mammogram 08/25/2020    Acute blood loss anemia     Acute deep vein thrombosis (DVT) of lower extremity 12/09/2020    Advance care planning 04/30/2021    Anemia due to chronic blood loss     Anemia due to chronic kidney disease     Anxiety     Bilateral ureteral obstruction 9/11/2020    Cardiovascular event risk -- low 09/14/2015    ASCVD 10-year risk 1.9% (with optimal risk factors 1.3%) as of 9/14/2015     Cervical cancer 2014    Chronic back pain     Colostomy care     Deep vein thrombosis     Depression     Diarrhea due to malabsorption 11/14/2018    Diarrhea due to malabsorption 11/14/2018    Difficult intubation     Disorder of kidney and ureter     DVT of lower extremity, bilateral 11/04/2020    Emphysema of lung 4/10/2023    Fibromyalgia     Fungemia 09/27/2020    Generalized abdominal pain 08/25/2020    GERD (gastroesophageal reflux disease)     Hemifacial spasm 09/16/2015    Hiatal hernia 2014    History of cervical cancer 10/11/2018    Hx of psychiatric care     Cymbalta, trazodone    Hypertension     Hypomagnesemia 11/21/2018    Lactose intolerance     Metastatic squamous cell carcinoma to lymph node 10/11/2018    Nephrostomy tube displaced     Neuropathy due to chemotherapeutic drug 11/14/2018    Osteoarthritis of back     Peritonitis 09/22/2020    Pseudomonas urinary tract infection 04/21/2021    Psychiatric problem     Refusal of blood transfusions as patient is Yazdanism     Schatzki's ring  09/14/2015    Seen on outside EGD 05/2014, underwent esophageal dilatation. Bx were negative.     Seizures     Sleep stage dysfunction     Noted on PSG 06/2017; negative for obstructive sleep apnea     Stroke     Urinary tract infection associated with nephrostomy catheter 06/11/2020    Wound infection after surgery 09/24/2020     Past Surgical History:   Procedure Laterality Date    ANTEGRADE NEPHROSTOGRAPHY Bilateral 9/28/2020    Procedure: Nephrostogram - antegrade;  Surgeon: Leslie Balbuena MD;  Location: [facility] OR 1ST FLR;  Service: Urology;  Laterality: Bilateral;    ANTEGRADE NEPHROSTOGRAPHY Left 4/20/2021    Procedure: NEPHROSTOGRAM, ANTEGRADE;  Surgeon: Leslie Balbuena MD;  Location: [facility] OR 1ST FLR;  Service: Urology;  Laterality: Left;    ANTEGRADE NEPHROSTOGRAPHY Right 10/27/2022    Procedure: NEPHROSTOGRAM, ANTEGRADE;  Surgeon: Chirag Russ MD;  [facility];  Service: Urology;  Laterality: Right;    BILATERAL OOPHORECTOMY  2015    CHOLECYSTECTOMY  11/09/2016    Done at [facility], path showed chronic cholecystitis and gallstones    cold knife conization  2014    COLECTOMY, RIGHT  9/17/2020    Procedure: COLECTOMY, RIGHT Extended;  Surgeon: Hammad Reynolds MD;  Location: [facility] OR 2ND FLR;  Service: Colon and Rectal;;    COLONOSCOPY  2014    COLONOSCOPY N/A 10/24/2016    at [facility], Dr Gutiérrez    COLONOSCOPY N/A 5/18/2018    Procedure: COLONOSCOPY;  Surgeon: Arden Gutiérrez MD;  Location: [facility] ENDO (4TH FLR);  Service: Endoscopy;  Laterality: N/A;    COLONOSCOPY N/A 7/28/2020    Procedure: COLONOSCOPY;  Surgeon: Hammad Reynolds MD;  Location: [facility] ENDO (4TH FLR);  Service: Colon and Rectal;  Laterality: N/A;  covid test [place] 7/25    CYSTOSCOPY WITH URETEROSCOPY, RETROGRADE PYELOGRAPHY, AND INSERTION OF STENT Bilateral 3/21/2020    Procedure: CYSTOSCOPY, WITH RETROGRADE PYELOGRAM,;  Surgeon: Leslie Balbuena MD;  Location: [facility] OR 1ST FLR;  Service: Urology;  Laterality: Bilateral;     DILATION OF NEPHROSTOMY TRACT Right 10/27/2022    Procedure: DILATION, NEPHROSTOMY TRACT;  Surgeon: Chirag Russ MD;  Location: [facility] OR 1ST FLR;  Service: Urology;  Laterality: Right;    ESOPHAGOGASTRODUODENOSCOPY  2014    ESOPHAGOGASTRODUODENOSCOPY  11/18/2020    ESOPHAGOGASTRODUODENOSCOPY N/A 11/18/2020    Procedure: ESOPHAGOGASTRODUODENOSCOPY (EGD);  Surgeon: Zenon Spencer MD;  Location: [facility] ENDO;  Service: Endoscopy;  Laterality: N/A;    ESOPHAGOGASTRODUODENOSCOPY N/A 12/11/2020    Procedure: EGD (ESOPHAGOGASTRODUODENOSCOPY);  Surgeon: Juancho Muse MD;  Location: [facility] ENDO (2ND FLR);  Service: Endoscopy;  Laterality: N/A;    HYSTERECTOMY  2014    [facility] for cervical cancer    ILEOSTOMY  9/17/2020    Procedure: CREATION, ILEOSTOMY;  Surgeon: Hammad Reynolds MD;  Location: [facility] OR 2ND FLR;  Service: Colon and Rectal;;    LOOPOGRAM N/A 4/20/2021    Procedure: LOOPOGRAM;  Surgeon: Leslie Balbuena MD;  Location: [facility] OR 1ST FLR;  Service: Urology;  Laterality: N/A;    MOBILIZATION OF SPLENIC FLEXURE N/A 9/11/2020    Procedure: MOBILIZATION, COLONIC;  Surgeon: Hammad Reynolds MD;  Location: [facility] OR 2ND FLR;  Service: Colon and Rectal;  Laterality: N/A;    NEPHROSTOGRAPHY Bilateral 4/17/2021    Procedure: Nephrostogram;  Surgeon: Celeste Surgeon;  Location: [facility];  Service: Anesthesiology;  Laterality: Bilateral;    OOPHORECTOMY      PYELOSCOPY Right 10/27/2022    Procedure: PYELOSCOPY;  Surgeon: Chirag Russ MD;  Location: [facility] OR 1ST FLR;  Service: Urology;  Laterality: Right;    REPLACEMENT OF NEPHROSTOMY TUBE Right 10/27/2022    Procedure: REPLACEMENT, NEPHROSTOMY TUBE;  Surgeon: Chirag Russ MD;  Location: [facility] OR 1ST FLR;  Service: Urology;  Laterality: Right;    RETROPERITONEAL LYMPHADENECTOMY Right 9/17/2018    Procedure: LYMPHADENECTOMY, RETROPERITONEUM;  Surgeon: Sebas Reed MD;  Location: [facility] OR 2ND FLR;  Service: General;  Laterality: Right;  ILIAC    URETEROSCOPIC  REMOVAL OF URETERIC CALCULUS Right 10/27/2022    Procedure: REMOVAL, CALCULUS, URETER, URETEROSCOPIC;  Surgeon: Chirag Russ MD;  Location: [facility] OR [address] [place] [address];  Service: Urology;  Laterality: Right;    URETEROSCOPY Right 10/27/2022    Procedure: URETEROSCOPY-ANTEGRADE;  Surgeon: Chirag Russ MD;  Location: [facility] OR [address] [place] [address];  Service: Urology;  Laterality: Right;       Review of Systems:   As documented in primary team H&P    Home Meds:   Prior to Admission medications    Medication Sig Start Date End Date Taking? Authorizing Provider   gabapentin (NEURONTIN) 100 MG capsule Take 2 capsules (200 mg total) by mouth every evening. 9/1/22  Yes Danuta Barboza MD   ketorolac (TORADOL) 10 mg tablet Take 1 tablet (10 mg total) by mouth every 6 (six) hours as needed for Pain. 9/8/22  Yes Denise Brooks NP   sucralfate (CARAFATE) 1 gram tablet Take 1 tablet (1 g total) by mouth 4 (four) times daily before meals and nightly. 9/1/22  Yes Danuta Barboza MD   traMADoL (ULTRAM) 50 mg tablet Take 1 tablet (50 mg total) by mouth every 6 (six) hours. 3/2/23  Yes James Guzman MD   acetaminophen (TYLENOL) 500 MG tablet Take 500 mg by mouth daily as needed for Pain.    Historical Provider   albuterol (VENTOLIN HFA) 90 mcg/actuation inhaler Inhale 2 puffs into the lungs every 6 (six) hours as needed for Wheezing or Shortness of Breath. Rescue 4/10/23   Sofie Driver MD   melatonin (MELATIN) 3 mg tablet Take 2 tablets (6 mg total) by mouth nightly as needed for Insomnia. 9/6/21   Kacey Bergeron MD   mirtazapine (REMERON SOL-TAB) 15 MG disintegrating tablet Dissolve 1 tablet (15 mg total) by mouth nightly. 2/15/22 4/10/23  Diony Ram MD   sodium bicarbonate 650 MG tablet Take 2 tablets (1,300 mg total) by mouth 2 (two) times daily. 2/15/22 4/10/23  Diony Ram MD     Scheduled Meds:    enoxaparin  40 mg Subcutaneous Daily    ertapenem (INVANZ) IVPB  1 g Intravenous Q24H    gabapentin  200  mg Oral QHS    linezolid  600 mg Oral Q12H    traMADoL  50 mg Oral Q6H     Continuous Infusions:   PRN Meds:acetaminophen, dextrose, dextrose, fentaNYL, melatonin, midazolam, naloxone, ondansetron, prochlorperazine, sodium chloride 0.9%, sodium chloride 0.9%  Anticoagulants/Antiplatelets: Lovenox    Allergies:   Review of patient's allergies indicates:   Allergen Reactions    Bee sting [allergen ext-venom-honey bee]      Rash      Grass pollen-bermuda, standard      rash     Sedation Hx: have not been any systemic reactions    Labs:  No results for input(s): INR in the last 168 hours.    Invalid input(s):  PT,  PTT    Recent Labs   Lab 04/17/23  0357   WBC 8.21   HGB 11.7*   HCT 39.0   MCV 91         Recent Labs   Lab 04/17/23  0357   GLU 76      K 3.9      CO2 18*   BUN 14   CREATININE 1.1   CALCIUM 9.4   ALT 12   AST 19   ALBUMIN 3.1*   BILITOT 0.4         Vitals:  Temp: 97.8 °F (36.6 °C) (04/21/23 1034)  Pulse: 72 (04/21/23 1034)  Resp: 20 (04/21/23 1034)  BP: 121/62 (04/21/23 1034)  SpO2: 99 % (04/21/23 1034)     Physical Exam:  ASA: per anesthesia  Mallampati: per anesthesia     General: no acute distress  Mental Status: alert and oriented to person, place and time  HEENT: normocephalic, atraumatic  Chest: unlabored breathing  Heart: regular heart rate  Abdomen: nondistended  Extremity: moves all extremities    Plan: Right nephrostomy access for PCNL.      Sedation Plan: per anesthesia    Alanis Ron MD  PGY-5  Pager: [phone]

## 2023-04-21 NOTE — ANESTHESIA PROCEDURE NOTES
Intubation    Date/Time: 4/21/2023 12:18 PM  Performed by: Brigid Miranda CRNA  Authorized by: Brigid Miranda CRNA     Intubation:     Induction:  Intravenous    Intubated:  Postinduction    Mask Ventilation:  Easy mask    Attempts:  1    Attempted By:  CRNA    Method of Intubation:  Video laryngoscopy    Blade:  Martinez 3    Laryngeal View Grade: Grade I - full view of cords      Difficult Airway Encountered?: No      Complications:  None    Airway Device:  Oral endotracheal tube    Airway Device Size:  7.5 (leak in 7.0 cuff, replaced with 7.5)    Style/Cuff Inflation:  Uncuffed    Tube secured:  21    Secured at:  The lips    Placement Verified By:  Capnometry    Complicating Factors:  None    Findings Post-Intubation:  BS equal bilateral and atraumatic/condition of teeth unchanged

## 2023-04-21 NOTE — ANESTHESIA PROCEDURE NOTES
Intubation    Date/Time: 4/21/2023 12:18 PM  Performed by: Brigid Miranda CRNA  Authorized by: Micah Watson MD     Intubation:     Induction:  Intravenous    Intubated:  Postinduction    Mask Ventilation:  Easy mask    Attempts:  1    Attempted By:  CRNA    Method of Intubation:  Video laryngoscopy    Blade:  Martinez 3    Laryngeal View Grade: Grade IIA - cords partially seen      Difficult Airway Encountered?: No      Complications:  None    Airway Device:  Oral endotracheal tube    Airway Device Size:  7.0    Style/Cuff Inflation:  Cuffed    Inflation Amount (mL):  6    Tube secured:  22    Secured at:  The lips    Placement Verified By:  Capnometry    Complicating Factors:  Short neck and poor neck/head extension    Findings Post-Intubation:  BS equal bilateral

## 2023-04-21 NOTE — ASSESSMENT & PLAN NOTE
-Pt. S/p transverson colono conduit and B/L nephrostomy tubes, now with worsening R sided hydronephrosis  -Urology consulted, R PCNL (new access with IR arranged at the same time), R antegrade URS, stone basket extraction 4/21  -post-operative pain   Multi-modal control   Acetaminophen 1000 mg TID, gabapentin 200 mg TID, oxybutynin ER 10 mg daily and tamsulosin 0.4 mg daily - stopped scheduled acetaminophen and start percocet 5/325 mg q4h prn pain  - urology following daily. Right nephrostomy tube removed. Left  Nephrostomy tube to remain in place

## 2023-04-21 NOTE — SUBJECTIVE & OBJECTIVE
Interval History: NAEON. AFVSS. NPO for procedure today. UOP adequate. Ucx enterococcus, on vanc and erta.    Objective:     Temp:  [97.6 °F (36.4 °C)-98.2 °F (36.8 °C)] 98.1 °F (36.7 °C)  Pulse:  [69-84] 78  Resp:  [16-18] 18  SpO2:  [98 %-99 %] 98 %  BP: ()/(51-61) 108/55     Body mass index is 29.17 kg/m².           Drains       Drain  Duration                  Urostomy 09/11/20 0000 ileal conduit  days         Ileostomy 09/18/20  days         Nephrostomy 02/14/23 Right 66 days         Nephrostomy 03/28/23 0953 Left 12 Fr. 23 days         Nephrostomy 03/28/23 0953 Right 12 Fr. 23 days                    Physical Exam  Constitutional:       Appearance: She is not toxic-appearing.   HENT:      Head: Normocephalic.   Cardiovascular:      Rate and Rhythm: Normal rate.   Pulmonary:      Effort: Pulmonary effort is normal. No respiratory distress.   Abdominal:      General: Abdomen is flat. There is no distension.      Palpations: Abdomen is soft.      Comments: Urostomy draining c/y/u.  R NT draining yellow urine - clearing.  L NT draining clear yellow urine - clearing.     Musculoskeletal:         General: No swelling or deformity. Normal range of motion.      Cervical back: Normal range of motion.   Skin:     General: Skin is warm and dry.      Findings: No erythema.   Neurological:      General: No focal deficit present.      Mental Status: She is alert.      Motor: No weakness.   Psychiatric:         Mood and Affect: Mood normal.         Behavior: Behavior normal.       Significant Labs:    BMP:  Recent Labs   Lab 04/16/23  1741 04/17/23  0357    137   K 4.4 3.9    110   CO2 18* 18*   BUN 16 14   CREATININE 1.1 1.1   CALCIUM 9.3 9.4       CBC:   Recent Labs   Lab 04/16/23  1621 04/17/23  0357   WBC 11.54 8.21   HGB 13.1 11.7*   HCT 44.1 39.0    231       All pertinent labs results from the past 24 hours have been reviewed.    Significant Imaging:  All pertinent imaging  results/findings from the past 24 hours have been reviewed.

## 2023-04-21 NOTE — PROGRESS NOTES
Pharmacokinetic Assessment Follow Up: IV Vancomycin    Vancomycin serum concentration assessment(s):    The trough level was drawn correctly and can be used to guide therapy at this time. The measurement is within the desired definitive target range of 10 to 15 mcg/mL.    Vancomycin Regimen Plan:    Change regimen to Vancomycin 1250 mg IV every 24 hours with next serum trough concentration measured at 20:00 prior to 3rd dose on 04/22/23.    Drug levels (last 3 results):  Recent Labs   Lab Result Units 04/18/23 2054 04/20/23 1955   Vancomycin-Trough ug/mL 13.5 15.4       Pharmacy will continue to follow and monitor vancomycin.    Please contact pharmacy at extension [phone] for questions regarding this assessment.    Thank you for the consult,   Benson Rhodes       Patient brief summary:  Edita Riley is a 58 y.o. female initiated on antimicrobial therapy with IV Vancomycin for treatment of urinary tract infection        Drug Allergies:   Review of patient's allergies indicates:   Allergen Reactions    Bee sting [allergen ext-venom-honey bee]      Rash      Grass pollen-bermuda, standard      rash       Actual Body Weight:   89.6 kg    Renal Function:   Estimated Creatinine Clearance: 64.9 mL/min (based on SCr of 1.1 mg/dL).,       CBC (last 72 hours):  No results for input(s): WHITE BLOOD CELL COUNT, HEMOGLOBIN, HEMATOCRIT, PLATELETS, GRAN%, LYMPH%, MONO%, EOSINOPHIL%, BASOPHIL%, DIFFERENTIAL METHOD in the last 72 hours.    Metabolic Panel (last 72 hours):  No results for input(s): SODIUM, POTASSIUM, CHLORIDE, CO2, GLUCOSE, BUN BLD, CREATININE, ALBUMIN, BILIRUBIN TOTAL, ALK PHOS, AST, ALT, MAGNESIUM, PHOSPHORUS in the last 72 hours.    Vancomycin Administrations:  vancomycin given in the last 96 hours                     vancomycin 1,500 mg in dextrose 5 % (D5W) 250 mL IVPB (Vial-Mate) (mg) 1,500 mg New Bag 04/19/23 2226     1,500 mg New Bag 04/18/23 2152     1,500 mg New Bag 04/17/23 2052                     Microbiologic Results:  Microbiology Results (last 7 days)       Procedure Component Value Units Date/Time    Blood culture #2 **CANNOT BE ORDERED STAT** [[id number]] Collected: 04/16/23 1602    Order Status: Completed Specimen: Blood from Peripheral, Forearm, Right Updated: 04/20/23 1812     Blood Culture, Routine No Growth to date      No Growth to date      No Growth to date      No Growth to date      No Growth to date    Blood culture #1 **CANNOT BE ORDERED STAT** [[id number]] Collected: 04/16/23 1621    Order Status: Completed Specimen: Blood from Peripheral, Forearm, Right Updated: 04/20/23 1812     Blood Culture, Routine No Growth to date      No Growth to date      No Growth to date      No Growth to date      No Growth to date    Urine culture [[id number]]  (Abnormal) Collected: 04/18/23 0629    Order Status: Completed Specimen: Urine, Nephrostomy Tube, Right Updated: 04/20/23 1342     Urine Culture, Routine ENTEROCOCCUS SPECIES  50,000 - 99,999 cfu/ml  Identification and susceptibility pending      Narrative:      Indicated criteria for high risk culture:->Prior to urologic  procedures    Urine culture [[id number]]  (Abnormal)  (Susceptibility) Collected: 04/16/23 1746    Order Status: Completed Specimen: Urine Updated: 04/18/23 2051     Urine Culture, Routine ENTEROCOCCUS FAECALIS  >100,000 cfu/ml      Narrative:      Specimen Source->Urine

## 2023-04-21 NOTE — PROGRESS NOTES
[facility] - [facility]  Urology  Progress Note    Patient Name: Edita Riley  MRN: [id number]  Admission Date: 4/16/2023  Hospital Length of Stay: 3 days  Code Status: Full Code   Attending Provider: Lucero Bah MD   Primary Care Physician: Primary Doctor No    Subjective:     HPI:  Edita Riley is a 57 yo F with PMHx distal ureteral strictures s/p transverse colon conduit and B/L nephrostomy tubes complicated by MDR infections who presents to the ED complaining of R sided flank pain since last night.     She has a history of distal ureteral strictures due to XRT for cervical cancer.  She underwent a transverse colon conduit on 9/11/2020.  The patient had bilateral hydronephrosis with reflux on the right side and a sluggish ureter on the left side. Bilateral PCNTs since Apr 2021.  She underwent a percutaneous antegrade approach on 10/27/2022 for ureteral stones.     She had a CT scan done on one of her ER visits on 12/2/2022 which showed a 2 mm stone in the right ureter and a 6 mm stone in the lower pole.  The left NT was accidentally removed on 2/13/2023 and it was replaced on 2/14/2023.  The right was replaced at the same time, per IR's note. Return visit to the ED 3/1/23 for dislodged L NT which was replaced on 3/2/23.  CT shows bilateral nephrostomy tubes in correct anatomic position, new onset right sided hydronephrosis, no hydro on the left, 4mm stone in the mid right ureter with proximal hydroureter, small lower pole renal stones in the right kidney.     She was recently seen in urology clinic and noted to have worsening R sided hydronephrosis with recent CT revealing Right NT appearing to be bypassing the renal parenchyma and directly entering the renal pelvis.  She also has a 4 mm R mid ureteral stone. She was initially planned to be direct admitted tomorrow for IV abx prior to R PCNL (new access with IR), R antegrade URS, stone basket extraction planned for 4/21.  Bilateral PCNT last  exchanged on 3/28.      On assessment, she is AFVSS.  She denies fevers, chills.  Since admission overnight, R NT w/ 90 cc of output and urostomy with 300 cc of output.  WBC 8, Cr 1.1 at baseline.  UA significant for 38 RBCs, 63 WBCs, moderate bacteria.  Blood cx and urine cx in process.  She is currently on vanc/ertapenem and ID has been consulted.                     Interval History: NAEON. AFVSS. NPO for procedure today. UOP adequate. Ucx enterococcus, on vanc and erta.    Objective:     Temp:  [97.6 °F (36.4 °C)-98.2 °F (36.8 °C)] 98.1 °F (36.7 °C)  Pulse:  [69-84] 78  Resp:  [16-18] 18  SpO2:  [98 %-99 %] 98 %  BP: ()/(51-61) 108/55     Body mass index is 29.17 kg/m².           Drains       Drain  Duration                  Urostomy 09/11/20 0000 ileal conduit  days         Ileostomy 09/18/20  days         Nephrostomy 02/14/23 Right 66 days         Nephrostomy 03/28/23 0953 Left 12 Fr. 23 days         Nephrostomy 03/28/23 0953 Right 12 Fr. 23 days                    Physical Exam  Constitutional:       Appearance: She is not toxic-appearing.   HENT:      Head: Normocephalic.   Cardiovascular:      Rate and Rhythm: Normal rate.   Pulmonary:      Effort: Pulmonary effort is normal. No respiratory distress.   Abdominal:      General: Abdomen is flat. There is no distension.      Palpations: Abdomen is soft.      Comments: Urostomy draining c/y/u.  R NT draining yellow urine - clearing.  L NT draining clear yellow urine - clearing.     Musculoskeletal:         General: No swelling or deformity. Normal range of motion.      Cervical back: Normal range of motion.   Skin:     General: Skin is warm and dry.      Findings: No erythema.   Neurological:      General: No focal deficit present.      Mental Status: She is alert.      Motor: No weakness.   Psychiatric:         Mood and Affect: Mood normal.         Behavior: Behavior normal.       Significant Labs:    BMP:  Recent Labs   Lab 04/16/23  6317  04/17/23  0357    137   K 4.4 3.9    110   CO2 18* 18*   BUN 16 14   CREATININE 1.1 1.1   CALCIUM 9.3 9.4       CBC:   Recent Labs   Lab 04/16/23  1621 04/17/23  0357   WBC 11.54 8.21   HGB 13.1 11.7*   HCT 44.1 39.0    231       All pertinent labs results from the past 24 hours have been reviewed.    Significant Imaging:  All pertinent imaging results/findings from the past 24 hours have been reviewed.                    Assessment/Plan:     Bilateral ureteral obstruction  Edita Riley is a 59 yo F with PMHx distal ureteral strictures 2/2 XRT for cervical cancer, s/p transverse colon conduit and B/L nephrostomy tubes complicated by MDR infections who presents to the ED complaining of R sided flank pain since last night.  She is scheduled for R PCNL (new access with IR), R antegrade URS, stone basket extraction planned for mid ureteral stone on 4/21.   - ID following, ucx growing enterococcus, blood cx neg  - currently on vanc/ertapenem   - NPO for R PCNL 4/21  - PRN antiemetics, MM pain control   - rest of care per primary            VTE Risk Mitigation (From admission, onward)         Ordered     IP VTE HIGH RISK PATIENT  Once         04/16/23 1930     Place sequential compression device  Until discontinued         04/16/23 1930                Rufus Dunn MD  Urology  [facility] - Surgery

## 2023-04-21 NOTE — OP NOTE
[facility]  Operative Note    Date: 04/21/2023    Pre-Op Diagnosis: Right renal stone, right ureteral stone    Post-Op Diagnosis: No right ureteral stone    Procedure(s) Performed:   1.  Right PCNL (< 1 cm)  2.  Right nephrostomy tube removal  3.  Right nephrostomy tube placement  4.  Right antegrade nephrostogram  5.  Fluoro < 1 hr  6.  Dilation of nephrostomy tube tract  7.  Antegrade ureteroscopy    Specimen(s): Right renal stones    Staff Surgeon: Chirag Russ MD    Assistant Surgeon: Mana Wilks MD    Anesthesia: General endotracheal anesthesia    Indications: Edita Riley is a 58 y.o. female with a right renal and ureteral stones on imaging presenting for definitive management.  She is s/p urinary diversion using transverse colon complicated by bilateral hydronephrosis. She has bilateral nephrostomy tubes in place.    Findings:  1.  IR obtained new access in the lower pole of the right kidney  2.  Negative right ureteroscopy   3.  Multiple stones basketed from the lower pole    Estimated Blood Loss: min    Drains:   1.  8 Fr nephrostomy tube - newly placed on the right   (Indwelling left nephrostomy tube from prior was left alone)      Procedure in detail:  After the risks, benefits and possible complications of the procedure were explained, the patient elected to undergo the above procedures and informed consent was obtained. The patient was taken to the OR suite and placed under anesthesia. Pre-operative antibiotics were administered. Time out was performed. SCDs were applied and working prior to the procedure.      The patient was then placed in prone position upon the IR table and prepped and draped in the usual sterile fashion.  IR then obtained new lower pole access, please see their note for full details.    A 5mm incision was made next to the wire using an 11 blade. We then placed a dual lumen access sheath over the super stiff wire left in place by the IR  team. A motion wire was placed into the dual lumen and into the ureter down to the conduit. Fluoroscopy was used to confirm correct wire placement in the conduit. The access sheath was removed. A 12/14 fr 35cm access sheath was then placed over the super stiff wire under fluoroscopic guidance and the inner sheath and wire were removed.     The flexible ureteroscope ([employer]) was advanced into the sheath. The UPJ was identified. The scope was advanced down the ureter into the conduit and no stone was identified. Flexible pyeloscopy revealed several small stones in the lower pole. These were basket extracted.     Flexible pyeloscopy was then performed to evaluate all the calyces. There were no significant stone fragments identified at the end of flexible pyeloscopy.      The indwelling right nephrostomy tube was removed. An 8 fr nephrostomy tube was inserted over the indwelling wire through the new access point. The inner sheath and wire were removed deploying a good coil within the renal pelvis.     The skin was closed with 3-0 monocryl in a vertical mattress fashion.  The nephrostomy tube was secured to the skin using a 2-0 nylon.      A sterile compressive dressing was placed. The patient was then transferred back to PACU in stable condition.    Disposition:  The patient will be transferred to the floor for postoperative monitoring.  She can be discharged from a urologic standpoint.     Mana Wilks MD    I have reviewed the operative note performed by Dr. Wilks, and I concur with her/his documentation of Edita Riley. I was present for the critical or key portions of the procedure.

## 2023-04-21 NOTE — PROGRESS NOTES
Hospital Medicine  Progress note    Team: [facility]OSP MED R Lucero Bah MD  Admit Date: 4/16/2023    Principal Problem:  Urinary tract infection associated with nephrostomy catheter    Interval hx:  Pain managed    ROS   Respiratory: neg for cough neg for shortness of breath  Cardiovascular: neg for chest pain neg for palpitations  Gastrointestinal: neg for nausea neg for vomiting, neg for abdominal pain neg for diarrhea neg for constipation   Behavioral/Psych: neg for depression neg for anxiety    PEx  Temp:  [97.5 °F (36.4 °C)-98.2 °F (36.8 °C)]   Pulse:  [69-84]   Resp:  [16-20]   BP: (104-135)/(53-61)   SpO2:  [97 %-99 %]     Intake/Output Summary (Last 24 hours) at 4/20/2023 2151  Last data filed at 4/20/2023 1855  Gross per 24 hour   Intake 360 ml   Output 1755 ml   Net -1395 ml     General Appearance: no acute distress, WDWN  Heart: regular rate and rhythm, no heave  Respiratory: Normal respiratory effort, symmetric excursion, bilateral vesicular breath sounds   Abdomen: Soft, non-tender; bowel sounds active  Skin: intact, no rash, no ulcers  Neurologic:  No focal numbness or weakness  Mental status: Alert, oriented x 4, affect appropriate     Recent Labs   Lab 04/16/23  1621 04/17/23  0357   WBC 11.54 8.21   HGB 13.1 11.7*   HCT 44.1 39.0    231     Recent Labs   Lab 04/16/23  1741 04/17/23  0357    137   K 4.4 3.9    110   CO2 18* 18*   BUN 16 14   CREATININE 1.1 1.1   GLU 83 76   CALCIUM 9.3 9.4     Recent Labs   Lab 04/16/23  1741 04/17/23  0357   ALKPHOS 115 120   ALT 8* 12   AST 15 19   ALBUMIN 3.2* 3.1*   PROT 6.6 6.7   BILITOT 0.3 0.4        Scheduled Meds:   ertapenem (INVANZ) IVPB  1 g Intravenous Q24H    gabapentin  200 mg Oral QHS    traMADoL  50 mg Oral Q6H    vancomycin (VANCOCIN) IVPB  15 mg/kg Intravenous Q24H     Continuous Infusions:  As Needed:  acetaminophen, dextrose, dextrose, melatonin, naloxone, ondansetron, prochlorperazine, sodium chloride 0.9%, sodium chloride  0.9%, Pharmacy to dose Vancomycin consult **AND** vancomycin - pharmacy to dose    Assessment and Plan  / Problems managed today    * Urinary tract infection associated with nephrostomy catheter  -Pt. 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 IV vancomycin and ertapenem started in ED, will continue vanc and consult ID for further abx recommendations. Repeat cultures ordered      CKD (chronic kidney disease), stage III  -Cr stable at 1.3, no acute issues      Ileostomy in place  -Pt. transverse colon conduit 2020 complicated by bowel perforation requiring hemicolectomy and ileostomy. No acute issues, ostomy bag in place      Bilateral ureteral obstruction  -Pt. S/p transverson colono conduit and B/L nephrostomy tubes, now with owrsening R sided hydronephrosis  -Urology consulted, original plan for R PCNL (new access with IR arranged at the same time), R antegrade URS, stone basket extraction later this week. Urology plan for intervention tomorrow    Discharge Planning   OK: 4/22/2023     Code Status: Full Code   Is the patient medically ready for discharge?:     Reason for patient still in hospital (select all that apply): Patient trending condition  Discharge Plan A: Home with family      Diet:  regular diet  GI PPx: not needed  DVT PPx:  enoxaparin  Drains: ileal conduit, bilateral nephrostomy  Airways: room air  Wounds: percutaneous drain sites    Goals of Care:  Return to prior functional status     Time (minutes) spent in care of the patient including review of tests, flow sheets and notes since last visit, face to face contact, placing orders, communicating with consultants if needed, care coordination, and documentation: 35 min.    Lucero Bah MD

## 2023-04-21 NOTE — ASSESSMENT & PLAN NOTE
-Pt. transverse colon conduit 2020 complicated by bowel perforation requiring hemicolectomy and ileostomy. No acute issues, ostomy bag in place  - recurring ostomy leakage affecting her quality fo life  - patient's  asked if this is reversible. Told him the urostomy is not reversible due to long-term damage from pelvic radiation for cervical cancer. Review CRS notes on events lead up to and operative note of ileostomy creation. She had much of colon removed and much adhesions. Likely not reversible. Will discuss with CRS if reversible, if so, will refer to outpatient CRS evaluation.

## 2023-04-21 NOTE — NURSING
Pt ostomy leaking and partially off. Pt cleaned, skin prepped and new ostomy placed. No open skin breakdown noted, but redness to skin around. Pt educated on calling nurse when ostomy not functioning so to decrease stool on skin that can lead to breakdown of the skin. Pt VU, reinforcement needed. Pt prepped for surgery and transport on floor to transfer pt via stretcher. Belongings left in room. Drains intact.

## 2023-04-21 NOTE — PLAN OF CARE
Pt off the floor undergoing undergoing procedure at time of rounds.    Chart reviewed.   -Urine cx 4/18 obtained from R nephrostomy tube now growing e faecium VRE.  -Unclear if klebsiella growing from urine cultures obtained from PCN tube collection bag is a colonizer/ contaminant vs true pathogen, but have been covering with ertapenem since 4/16 and did not grow in subsequent culture.     Underwent Rt PCN drain removal, stone extraction and drain replacement today.    Recommendations:  --continue ertapenem 1g q 24h while in the hospital to complete a 10 day course.   --transition po Bactrim on discharge to complete course. Last day 4/25/23.  --vanc switched to linezolid today to cover e faecium VRE; continue x 10 days. Last day 4/30/23.    ID will sign off for now. Please contact us with any concerns or new culture results.    Michelle Mohamud MD  Infectious Disease

## 2023-04-21 NOTE — ASSESSMENT & PLAN NOTE
Edita Riley is a 57 yo F with PMHx distal ureteral strictures 2/2 XRT for cervical cancer, s/p transverse colon conduit and B/L nephrostomy tubes complicated by MDR infections who presents to the ED complaining of R sided flank pain since last night.  She is scheduled for R PCNL (new access with IR), R antegrade URS, stone basket extraction planned for mid ureteral stone on 4/21.   - ID following, ucx growing enterococcus, blood cx neg  - currently on vanc/ertapenem   - NPO for R PCNL 4/21  - PRN antiemetics, MM pain control   - rest of care per primary

## 2023-04-21 NOTE — ANESTHESIA PROCEDURE NOTES
Right EDNA SS    Patient location during procedure: pre-op   Block not for primary anesthetic.  Reason for block: at surgeon's request and post-op pain management   Post-op Pain Location: Abdominal Pain   Start time: 4/21/2023 12:31 PM  Timeout: 4/21/2023 12:31 PM   End time: 4/21/2023 12:34 PM    Staffing  Authorizing Provider: Trip Arellano MD  Performing Provider: Linda Crystal MD    Preanesthetic Checklist  Completed: patient identified, IV checked, site marked, risks and benefits discussed, surgical consent, monitors and equipment checked, pre-op evaluation and timeout performed  Peripheral Block  Patient position: sitting  Prep: ChloraPrep  Patient monitoring: heart rate, cardiac monitor, continuous pulse ox, continuous capnometry and frequent blood pressure checks  Block type: erector spinae plane  Laterality: right  Injection technique: single shot  Interspace: T10-11    Needle  Needle type: Tuohy   Needle gauge: 17 G  Needle length: 3.5 in  Needle localization: anatomical landmarks and ultrasound guidance   -ultrasound image captured on disc.  Assessment  Injection assessment: negative aspiration, negative parasthesia and local visualized surrounding nerve  Paresthesia pain: none  Heart rate change: no  Slow fractionated injection: yes  Pain Tolerance: comfortable throughout block and no complaints  Medications:    Medications: bupivacaine (pf) (MARCAINE) injection 0.75% - Perineural   15 mL - 4/21/2023 12:34:00 PM    Additional Notes  Patient tolerated well.  See [facility] RN record for vitals.

## 2023-04-21 NOTE — PROGRESS NOTES
Hospital Medicine  Progress note    Team: [facility]OSP MED R Lucero Bah MD  Admit Date: 4/16/2023    Principal Problem:  Urinary tract infection associated with nephrostomy catheter    Interval hx:  Pain managed. No new complaints    ROS   Respiratory: neg for cough neg for shortness of breath  Cardiovascular: neg for chest pain neg for palpitations  Gastrointestinal: neg for nausea neg for vomiting, neg for abdominal pain neg for diarrhea neg for constipation   Behavioral/Psych: neg for depression neg for anxiety    PEx  Temp:  [97.6 °F (36.4 °C)-98.2 °F (36.8 °C)]   Pulse:  [69-84]   Resp:  [16-20]   BP: ()/(51-62)   SpO2:  [97 %-99 %]     Intake/Output Summary (Last 24 hours) at 4/21/2023 1114  Last data filed at 4/21/2023 0130  Gross per 24 hour   Intake --   Output 1005 ml   Net -1005 ml       General Appearance: no acute distress, WDWN  Heart: regular rate and rhythm, no heave  Respiratory: Normal respiratory effort, symmetric excursion, bilateral vesicular breath sounds   Abdomen: Soft, non-tender; bowel sounds active  Skin: intact, no rash, no ulcers  Neurologic:  No focal numbness or weakness  Mental status: Alert, oriented x 4, affect appropriate     Recent Labs   Lab 04/16/23  1621 04/17/23  0357   WBC 11.54 8.21   HGB 13.1 11.7*   HCT 44.1 39.0    231       Recent Labs   Lab 04/16/23  1741 04/17/23  0357    137   K 4.4 3.9    110   CO2 18* 18*   BUN 16 14   CREATININE 1.1 1.1   GLU 83 76   CALCIUM 9.3 9.4       Recent Labs   Lab 04/16/23  1741 04/17/23  0357   ALKPHOS 115 120   ALT 8* 12   AST 15 19   ALBUMIN 3.2* 3.1*   PROT 6.6 6.7   BILITOT 0.3 0.4          Scheduled Meds:   ertapenem (INVANZ) IVPB  1 g Intravenous Q24H    gabapentin  200 mg Oral QHS    linezolid  600 mg Oral Q12H    traMADoL  50 mg Oral Q6H     Continuous Infusions:  As Needed:  acetaminophen, dextrose, dextrose, fentaNYL, melatonin, midazolam, naloxone, ondansetron, prochlorperazine, sodium chloride 0.9%,  sodium chloride 0.9%    Assessment and Plan  / Problems managed today    * Urinary tract infection associated with nephrostomy catheter  -Pt. 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 IV vancomycin and ertapenem started in ED, will continue vanc and consult ID for further abx recommendations. Repeat cultures ordered      CKD (chronic kidney disease), stage III  -Cr stable at 1.3, no acute issues      Ileostomy in place  -Pt. transverse colon conduit 2020 complicated by bowel perforation requiring hemicolectomy and ileostomy. No acute issues, ostomy bag in place      Bilateral ureteral obstruction  -Pt. S/p transverson colono conduit and B/L nephrostomy tubes, now with owrsening R sided hydronephrosis  -Urology consulted, original plan for R PCNL (new access with IR arranged at the same time), R antegrade URS, stone basket extraction later this week. Urology plan for intervention today    Discharge Planning   OK: 4/22/2023     Code Status: Full Code   Is the patient medically ready for discharge?:     Reason for patient still in hospital (select all that apply): Patient trending condition  Discharge Plan A: Home with family      Diet:  regular diet  GI PPx: not needed  DVT PPx:  enoxaparin  Drains: ileal conduit, bilateral nephrostomy  Airways: room air  Wounds: percutaneous drain sites    Goals of Care:  Return to prior functional status     Time (minutes) spent in care of the patient including review of tests, flow sheets and notes since last visit, face to face contact, placing orders, communicating with consultants if needed, care coordination, and documentation: 35 min.    Lucero Bah MD

## 2023-04-21 NOTE — TRANSFER OF CARE
"Anesthesia Transfer of Care Note    [facility]    Procedure(s) Performed: Procedure(s) (LRB):  NEPHROLITHOTOMY, PERCUTANEOUS (Right)  NEPHROSTOGRAM, ANTEGRADE (Right)  CREATION, NEPHROSTOMY, PERCUTANEOUS with removal of existing nephrostomy tube    Patient location: PACU    Anesthesia Type: general    Transport from OR: Transported from OR on 6-10 L/min O2 by face mask with adequate spontaneous ventilation    Post pain: adequate analgesia    Post assessment: no apparent anesthetic complications    Post vital signs: stable    Level of consciousness: awake    Nausea/Vomiting: no nausea/vomiting    Complications: none    Transfer of care protocol was followed      Last vitals:   Visit Vitals  BP (!) 105/53   Pulse 83   Temp 36.3 °C (97.3 °F) (Temporal)   Resp 13   Ht 5' 9" (1.753 m)   Wt 89.4 kg (197 lb)   LMP 06/08/2014 (Approximate)   SpO2 (!) 92%   Breastfeeding No   BMI 29.09 kg/m²     "

## 2023-04-21 NOTE — NURSING TRANSFER
Nursing Transfer Note      4/21/2023     Reason patient is being transferred: back to room from PACU    Transfer To: \[id number]    Transfer via stretcher    Transfer with N/A    Transported by PCT/transport    Medicines sent: none    Any special needs or follow-up needed: no    Chart send with patient: Yes    Notified: spouse    Patient reassessed at: [id number]

## 2023-04-22 LAB
ANION GAP SERPL CALC-SCNC: 11 MMOL/L (ref 8–16)
BUN SERPL-MCNC: 22 MG/DL (ref 6–20)
CALCIUM SERPL-MCNC: 9.7 MG/DL (ref 8.7–10.5)
CHLORIDE SERPL-SCNC: 104 MMOL/L (ref 95–110)
CO2 SERPL-SCNC: 21 MMOL/L (ref 23–29)
CREAT SERPL-MCNC: 1.4 MG/DL (ref 0.5–1.4)
EST. GFR  (NO RACE VARIABLE): 43.1 ML/MIN/1.73 M^2
GLUCOSE SERPL-MCNC: 137 MG/DL (ref 70–110)
POTASSIUM SERPL-SCNC: 4.3 MMOL/L (ref 3.5–5.1)
SODIUM SERPL-SCNC: 136 MMOL/L (ref 136–145)

## 2023-04-22 PROCEDURE — 25000003 PHARM REV CODE 250: Performed by: PHYSICIAN ASSISTANT

## 2023-04-22 PROCEDURE — 99233 SBSQ HOSP IP/OBS HIGH 50: CPT | Mod: ,,, | Performed by: INTERNAL MEDICINE

## 2023-04-22 PROCEDURE — 99233 PR SUBSEQUENT HOSPITAL CARE,LEVL III: ICD-10-PCS | Mod: ,,, | Performed by: INTERNAL MEDICINE

## 2023-04-22 PROCEDURE — 27000207 HC ISOLATION

## 2023-04-22 PROCEDURE — 80048 BASIC METABOLIC PNL TOTAL CA: CPT | Performed by: STUDENT IN AN ORGANIZED HEALTH CARE EDUCATION/TRAINING PROGRAM

## 2023-04-22 PROCEDURE — 63600175 PHARM REV CODE 636 W HCPCS: Performed by: INTERNAL MEDICINE

## 2023-04-22 PROCEDURE — 11000001 HC ACUTE MED/SURG PRIVATE ROOM

## 2023-04-22 PROCEDURE — 25000003 PHARM REV CODE 250: Performed by: INTERNAL MEDICINE

## 2023-04-22 PROCEDURE — 25000003 PHARM REV CODE 250: Performed by: HOSPITALIST

## 2023-04-22 PROCEDURE — 63600175 PHARM REV CODE 636 W HCPCS: Performed by: PHYSICIAN ASSISTANT

## 2023-04-22 PROCEDURE — 36415 COLL VENOUS BLD VENIPUNCTURE: CPT | Performed by: STUDENT IN AN ORGANIZED HEALTH CARE EDUCATION/TRAINING PROGRAM

## 2023-04-22 RX ORDER — ACETAMINOPHEN 325 MG/1
650 TABLET ORAL EVERY 8 HOURS PRN
Status: DISCONTINUED | OUTPATIENT
Start: 2023-04-22 | End: 2023-04-26 | Stop reason: HOSPADM

## 2023-04-22 RX ORDER — HYOSCYAMINE SULFATE 0.38 MG/1
0.75 TABLET, EXTENDED RELEASE ORAL EVERY 12 HOURS
Status: DISCONTINUED | OUTPATIENT
Start: 2023-04-22 | End: 2023-04-22

## 2023-04-22 RX ORDER — OXYBUTYNIN CHLORIDE 10 MG/1
10 TABLET, EXTENDED RELEASE ORAL DAILY
Status: DISCONTINUED | OUTPATIENT
Start: 2023-04-22 | End: 2023-04-26 | Stop reason: HOSPADM

## 2023-04-22 RX ORDER — OXYCODONE HYDROCHLORIDE 10 MG/1
10 TABLET ORAL ONCE
Status: COMPLETED | OUTPATIENT
Start: 2023-04-22 | End: 2023-04-22

## 2023-04-22 RX ORDER — TAMSULOSIN HYDROCHLORIDE 0.4 MG/1
0.4 CAPSULE ORAL DAILY
Status: DISCONTINUED | OUTPATIENT
Start: 2023-04-22 | End: 2023-04-26 | Stop reason: HOSPADM

## 2023-04-22 RX ORDER — ACETAMINOPHEN 500 MG
1000 TABLET ORAL 3 TIMES DAILY
Status: DISCONTINUED | OUTPATIENT
Start: 2023-04-22 | End: 2023-04-25

## 2023-04-22 RX ORDER — OXYCODONE HYDROCHLORIDE 10 MG/1
10 TABLET ORAL EVERY 4 HOURS PRN
Status: DISCONTINUED | OUTPATIENT
Start: 2023-04-22 | End: 2023-04-25

## 2023-04-22 RX ADMIN — OXYCODONE HYDROCHLORIDE 10 MG: 10 TABLET ORAL at 09:04

## 2023-04-22 RX ADMIN — LINEZOLID 600 MG: 600 TABLET, FILM COATED ORAL at 08:04

## 2023-04-22 RX ADMIN — ACETAMINOPHEN 1000 MG: 500 TABLET ORAL at 03:04

## 2023-04-22 RX ADMIN — TRAMADOL HYDROCHLORIDE 50 MG: 50 TABLET, COATED ORAL at 05:04

## 2023-04-22 RX ADMIN — ACETAMINOPHEN 650 MG: 325 TABLET ORAL at 08:04

## 2023-04-22 RX ADMIN — OXYCODONE HYDROCHLORIDE 10 MG: 10 TABLET ORAL at 03:04

## 2023-04-22 RX ADMIN — OXYBUTYNIN CHLORIDE 10 MG: 10 TABLET, EXTENDED RELEASE ORAL at 11:04

## 2023-04-22 RX ADMIN — ERTAPENEM 1 G: 1 INJECTION INTRAMUSCULAR; INTRAVENOUS at 03:04

## 2023-04-22 RX ADMIN — TAMSULOSIN HYDROCHLORIDE 0.4 MG: 0.4 CAPSULE ORAL at 11:04

## 2023-04-22 RX ADMIN — GABAPENTIN 200 MG: 100 CAPSULE ORAL at 08:04

## 2023-04-22 RX ADMIN — ENOXAPARIN SODIUM 40 MG: 40 INJECTION SUBCUTANEOUS at 06:04

## 2023-04-22 RX ADMIN — OXYCODONE HYDROCHLORIDE 10 MG: 10 TABLET ORAL at 08:04

## 2023-04-22 RX ADMIN — ACETAMINOPHEN 1000 MG: 500 TABLET ORAL at 08:04

## 2023-04-22 NOTE — PROGRESS NOTES
[facility] - [facility]  Urology  Progress Note    Patient Name: Edita Riley  MRN: [id number]  Admission Date: 4/16/2023  Hospital Length of Stay: 4 days  Code Status: Full Code   Attending Provider: Lucero Bah MD   Primary Care Physician: Primary Doctor No    Subjective:     HPI:  Edita Riley is a 57 yo F with PMHx distal ureteral strictures s/p transverse colon conduit and B/L nephrostomy tubes complicated by MDR infections who presents to the ED complaining of R sided flank pain since last night.     She has a history of distal ureteral strictures due to XRT for cervical cancer.  She underwent a transverse colon conduit on 9/11/2020.  The patient had bilateral hydronephrosis with reflux on the right side and a sluggish ureter on the left side. Bilateral PCNTs since Apr 2021.  She underwent a percutaneous antegrade approach on 10/27/2022 for ureteral stones.     She had a CT scan done on one of her ER visits on 12/2/2022 which showed a 2 mm stone in the right ureter and a 6 mm stone in the lower pole.  The left NT was accidentally removed on 2/13/2023 and it was replaced on 2/14/2023.  The right was replaced at the same time, per IR's note. Return visit to the ED 3/1/23 for dislodged L NT which was replaced on 3/2/23.  CT shows bilateral nephrostomy tubes in correct anatomic position, new onset right sided hydronephrosis, no hydro on the left, 4mm stone in the mid right ureter with proximal hydroureter, small lower pole renal stones in the right kidney.     She was recently seen in urology clinic and noted to have worsening R sided hydronephrosis with recent CT revealing Right NT appearing to be bypassing the renal parenchyma and directly entering the renal pelvis.  She also has a 4 mm R mid ureteral stone. She was initially planned to be direct admitted tomorrow for IV abx prior to R PCNL (new access with IR), R antegrade URS, stone basket extraction planned for 4/21.  Bilateral PCNT last  exchanged on 3/28.      On assessment, she is AFVSS.  She denies fevers, chills.  Since admission overnight, R NT w/ 90 cc of output and urostomy with 300 cc of output.  WBC 8, Cr 1.1 at baseline.  UA significant for 38 RBCs, 63 WBCs, moderate bacteria.  Blood cx and urine cx in process.  She is currently on vanc/ertapenem and ID has been consulted.                     Interval History: NAEON. AFVSS. Patient reports right flank pain overnight, not controlled by pain medicine. Tolerating PO. Has not ambulated. Good UOP.    Objective:     Temp:  [97.3 °F (36.3 °C)-97.9 °F (36.6 °C)] 97.8 °F (36.6 °C)  Pulse:  [71-90] 74  Resp:  [12-20] 18  SpO2:  [92 %-100 %] 97 %  BP: (102-125)/(51-74) 125/61     Body mass index is 29.09 kg/m².           Drains       Drain  Duration                  Urostomy 09/11/20 0000 ileal conduit  days         Ileostomy 09/18/20  days         Nephrostomy 02/14/23 Right 66 days         Nephrostomy 03/28/23 0953 Left 12 Fr. 23 days         Nephrostomy 03/28/23 0953 Right 12 Fr. 23 days                    Physical Exam  Vitals reviewed.   Constitutional:       General: She is not in acute distress.     Appearance: She is not toxic-appearing or diaphoretic.   HENT:      Head: Normocephalic and atraumatic.      Nose: Nose normal.   Eyes:      Conjunctiva/sclera: Conjunctivae normal.   Cardiovascular:      Rate and Rhythm: Normal rate.   Pulmonary:      Effort: Pulmonary effort is normal. No respiratory distress.   Abdominal:      General: Abdomen is flat. There is no distension.      Palpations: Abdomen is soft.      Comments: Urostomy draining c/y/u.  R NT draining light red urine, dressing in place not soaked, tenderness to palpation of right flank.  L NT draining clear yellow urine.   Musculoskeletal:         General: No swelling or deformity. Normal range of motion.      Cervical back: Normal range of motion.   Skin:     General: Skin is warm and dry.      Findings: No erythema.    Neurological:      General: No focal deficit present.      Mental Status: She is alert.      Motor: No weakness.   Psychiatric:         Mood and Affect: Mood normal.         Behavior: Behavior normal.         Thought Content: Thought content normal.       Significant Labs:    BMP:  Recent Labs   Lab 04/16/23  1741 04/17/23  0357    137   K 4.4 3.9    110   CO2 18* 18*   BUN 16 14   CREATININE 1.1 1.1   CALCIUM 9.3 9.4         CBC:   Recent Labs   Lab 04/16/23  1621 04/17/23  0357   WBC 11.54 8.21   HGB 13.1 11.7*   HCT 44.1 39.0    231         All pertinent labs results from the past 24 hours have been reviewed.    Significant Imaging:  All pertinent imaging results/findings from the past 24 hours have been reviewed.                    Assessment/Plan:     Bilateral ureteral obstruction  Edita Riley is a 59 yo F with PMHx distal ureteral strictures 2/2 XRT for cervical cancer, s/p transverse colon conduit and B/L nephrostomy tubes complicated by MDR infections who presents to the ED complaining of R sided flank pain since last night.  She is scheduled for R PCNL (new access with IR), R antegrade URS, stone basket extraction planned for mid ureteral stone on 4/21.     - ID following, ucx growing enterococcus, blood cx neg  - currently on vanc/ertapenem   - S/p R PCNL 4/21.  - PRN antiemetics, MM pain control - recommend additional analgesics for the immediate post-operative period.  - Clamping trial of her bilateral nephrostomy tubes today.  - Check Cr now and reassess Cr tomorrow after clamping trial.  - rest of care per primary            VTE Risk Mitigation (From admission, onward)         Ordered     enoxaparin injection 40 mg  Daily         04/21/23 1115     IP VTE HIGH RISK PATIENT  Once         04/16/23 1930     Place sequential compression device  Until discontinued         04/16/23 1930                Karina Beverly MD  Urology  [facility] - Surgery

## 2023-04-22 NOTE — NURSING
Pt resting in bed. Drains intact. Bilateral nephrostomy tubes continue clamped per doctor. Urostomy draining to gravity. Ostomy draining and continues CDI. Rounds completed. Pt able to reposition self. Call light in reach, safety maintained. Will continue to monitor. Will provide handoff to oncoming nurse.

## 2023-04-22 NOTE — SUBJECTIVE & OBJECTIVE
Interval History: NAEON. AFVSS. Patient reports right flank pain overnight, not controlled by pain medicine. Tolerating PO. Has not ambulated. Good UOP.    Objective:     Temp:  [97.3 °F (36.3 °C)-97.9 °F (36.6 °C)] 97.8 °F (36.6 °C)  Pulse:  [71-90] 74  Resp:  [12-20] 18  SpO2:  [92 %-100 %] 97 %  BP: (102-125)/(51-74) 125/61     Body mass index is 29.09 kg/m².           Drains       Drain  Duration                  Urostomy 09/11/20 0000 ileal conduit  days         Ileostomy 09/18/20  days         Nephrostomy 02/14/23 Right 66 days         Nephrostomy 03/28/23 0953 Left 12 Fr. 23 days         Nephrostomy 03/28/23 0953 Right 12 Fr. 23 days                    Physical Exam  Vitals reviewed.   Constitutional:       General: She is not in acute distress.     Appearance: She is not toxic-appearing or diaphoretic.   HENT:      Head: Normocephalic and atraumatic.      Nose: Nose normal.   Eyes:      Conjunctiva/sclera: Conjunctivae normal.   Cardiovascular:      Rate and Rhythm: Normal rate.   Pulmonary:      Effort: Pulmonary effort is normal. No respiratory distress.   Abdominal:      General: Abdomen is flat. There is no distension.      Palpations: Abdomen is soft.      Comments: Urostomy draining c/y/u.  R NT draining light red urine, dressing in place not soaked, tenderness to palpation of right flank.  L NT draining clear yellow urine.   Musculoskeletal:         General: No swelling or deformity. Normal range of motion.      Cervical back: Normal range of motion.   Skin:     General: Skin is warm and dry.      Findings: No erythema.   Neurological:      General: No focal deficit present.      Mental Status: She is alert.      Motor: No weakness.   Psychiatric:         Mood and Affect: Mood normal.         Behavior: Behavior normal.         Thought Content: Thought content normal.       Significant Labs:    BMP:  Recent Labs   Lab 04/16/23  1741 04/17/23  0357    137   K 4.4 3.9    110    CO2 18* 18*   BUN 16 14   CREATININE 1.1 1.1   CALCIUM 9.3 9.4         CBC:   Recent Labs   Lab 04/16/23  1621 04/17/23  0357   WBC 11.54 8.21   HGB 13.1 11.7*   HCT 44.1 39.0    231         All pertinent labs results from the past 24 hours have been reviewed.    Significant Imaging:  All pertinent imaging results/findings from the past 24 hours have been reviewed.

## 2023-04-22 NOTE — ASSESSMENT & PLAN NOTE
Edita Hardin [facility] is a 59 yo F with PMHx distal ureteral strictures 2/2 XRT for cervical cancer, s/p transverse colon conduit and B/L nephrostomy tubes complicated by MDR infections who presents to the ED complaining of R sided flank pain since last night.  She is scheduled for R PCNL (new access with IR), R antegrade URS, stone basket extraction planned for mid ureteral stone on 4/21.     - ID following, ucx growing enterococcus, blood cx neg  - currently on vanc/ertapenem   - S/p R PCNL 4/21.  - PRN antiemetics, MM pain control - recommend additional analgesics for the immediate post-operative period.  - Clamping trial of her bilateral nephrostomy tubes today.  - Check Cr now and reassess Cr tomorrow after clamping trial.  - rest of care per primary

## 2023-04-23 LAB
ANION GAP SERPL CALC-SCNC: 8 MMOL/L (ref 8–16)
BUN SERPL-MCNC: 27 MG/DL (ref 6–20)
CALCIUM SERPL-MCNC: 9.1 MG/DL (ref 8.7–10.5)
CHLORIDE SERPL-SCNC: 106 MMOL/L (ref 95–110)
CO2 SERPL-SCNC: 22 MMOL/L (ref 23–29)
CREAT SERPL-MCNC: 1.9 MG/DL (ref 0.5–1.4)
EST. GFR  (NO RACE VARIABLE): 29.9 ML/MIN/1.73 M^2
GLUCOSE SERPL-MCNC: 100 MG/DL (ref 70–110)
POTASSIUM SERPL-SCNC: 4.6 MMOL/L (ref 3.5–5.1)
SODIUM SERPL-SCNC: 136 MMOL/L (ref 136–145)

## 2023-04-23 PROCEDURE — 99233 PR SUBSEQUENT HOSPITAL CARE,LEVL III: ICD-10-PCS | Mod: ,,, | Performed by: INTERNAL MEDICINE

## 2023-04-23 PROCEDURE — 27000207 HC ISOLATION

## 2023-04-23 PROCEDURE — 25000003 PHARM REV CODE 250: Performed by: PHYSICIAN ASSISTANT

## 2023-04-23 PROCEDURE — 63600175 PHARM REV CODE 636 W HCPCS: Performed by: INTERNAL MEDICINE

## 2023-04-23 PROCEDURE — 63600175 PHARM REV CODE 636 W HCPCS: Performed by: PHYSICIAN ASSISTANT

## 2023-04-23 PROCEDURE — 80048 BASIC METABOLIC PNL TOTAL CA: CPT

## 2023-04-23 PROCEDURE — 25000003 PHARM REV CODE 250: Performed by: INTERNAL MEDICINE

## 2023-04-23 PROCEDURE — 36415 COLL VENOUS BLD VENIPUNCTURE: CPT

## 2023-04-23 PROCEDURE — 99233 SBSQ HOSP IP/OBS HIGH 50: CPT | Mod: ,,, | Performed by: INTERNAL MEDICINE

## 2023-04-23 PROCEDURE — 11000001 HC ACUTE MED/SURG PRIVATE ROOM

## 2023-04-23 PROCEDURE — 25000003 PHARM REV CODE 250: Performed by: HOSPITALIST

## 2023-04-23 RX ADMIN — ENOXAPARIN SODIUM 40 MG: 40 INJECTION SUBCUTANEOUS at 04:04

## 2023-04-23 RX ADMIN — ACETAMINOPHEN 1000 MG: 500 TABLET ORAL at 02:04

## 2023-04-23 RX ADMIN — ACETAMINOPHEN 1000 MG: 500 TABLET ORAL at 09:04

## 2023-04-23 RX ADMIN — TAMSULOSIN HYDROCHLORIDE 0.4 MG: 0.4 CAPSULE ORAL at 09:04

## 2023-04-23 RX ADMIN — ERTAPENEM 1 G: 1 INJECTION INTRAMUSCULAR; INTRAVENOUS at 02:04

## 2023-04-23 RX ADMIN — OXYCODONE HYDROCHLORIDE 10 MG: 10 TABLET ORAL at 09:04

## 2023-04-23 RX ADMIN — LINEZOLID 600 MG: 600 TABLET, FILM COATED ORAL at 09:04

## 2023-04-23 RX ADMIN — GABAPENTIN 200 MG: 100 CAPSULE ORAL at 09:04

## 2023-04-23 RX ADMIN — OXYBUTYNIN CHLORIDE 10 MG: 10 TABLET, EXTENDED RELEASE ORAL at 09:04

## 2023-04-23 NOTE — ANESTHESIA POSTPROCEDURE EVALUATION
Anesthesia Post Evaluation    [facility]    Procedure(s) Performed: Procedure(s) (LRB):  NEPHROLITHOTOMY, PERCUTANEOUS (Right)  NEPHROSTOGRAM, ANTEGRADE (Right)  CREATION, NEPHROSTOMY, PERCUTANEOUS with removal of existing nephrostomy tube    Final Anesthesia Type: general      Patient location during evaluation: PACU  Patient participation: Yes- Able to Participate  Level of consciousness: awake  Post-procedure vital signs: reviewed and stable  Pain management: adequate  Airway patency: patent    PONV status at discharge: No PONV  Anesthetic complications: no      Cardiovascular status: blood pressure returned to baseline  Respiratory status: unassisted  Hydration status: euvolemic  Follow-up not needed.          Vitals Value Taken Time   /59 04/23/23 1117   Temp 35.8 °C (96.5 °F) 04/23/23 1117   Pulse 80 04/23/23 1117   Resp 14 04/23/23 1117   SpO2 93 % 04/23/23 1117         Event Time   Out of Recovery 04/21/2023 15:00:00         Pain/Caitie Score: Pain Rating Prior to Med Admin: 7 (4/23/2023  9:49 AM)  Pain Rating Post Med Admin: 4 (4/22/2023 10:35 PM)

## 2023-04-23 NOTE — PROGRESS NOTES
Hospital Medicine  Progress note    Team: [facility] HOSP MED R Lucero Bah MD  Admit Date: 4/16/2023    Principal Problem:  Urinary tract infection associated with nephrostomy catheter    Interval hx:  Pain not controlled last night. No new complaints    ROS   Respiratory: neg for cough neg for shortness of breath  Cardiovascular: neg for chest pain neg for palpitations  Gastrointestinal: neg for nausea neg for vomiting, neg for abdominal pain neg for diarrhea neg for constipation   Behavioral/Psych: neg for depression neg for anxiety    PEx  Temp:  [96 °F (35.6 °C)-97.8 °F (36.6 °C)]   Pulse:  [74-94]   Resp:  [16-18]   BP: (109-127)/(54-66)   SpO2:  [90 %-98 %]     Intake/Output Summary (Last 24 hours) at 4/22/2023 2308  Last data filed at 4/22/2023 1812  Gross per 24 hour   Intake 200 ml   Output 1900 ml   Net -1700 ml       General Appearance: no acute distress, WDWN  Heart: regular rate and rhythm, no heave  Respiratory: Normal respiratory effort, symmetric excursion, bilateral vesicular breath sounds   Abdomen: Soft, non-tender; bowel sounds active  Skin: intact, no rash, no ulcers  Neurologic:  No focal numbness or weakness  Mental status: Alert, oriented x 4, affect appropriate     Recent Labs   Lab 04/16/23  1621 04/17/23  0357   WBC 11.54 8.21   HGB 13.1 11.7*   HCT 44.1 39.0    231       Recent Labs   Lab 04/16/23  1741 04/17/23  0357 04/22/23  0828    137 136   K 4.4 3.9 4.3    110 104   CO2 18* 18* 21*   BUN 16 14 22*   CREATININE 1.1 1.1 1.4   GLU 83 76 137*   CALCIUM 9.3 9.4 9.7       Recent Labs   Lab 04/16/23  1741 04/17/23  0357   ALKPHOS 115 120   ALT 8* 12   AST 15 19   ALBUMIN 3.2* 3.1*   PROT 6.6 6.7   BILITOT 0.3 0.4          Scheduled Meds:   acetaminophen  1,000 mg Oral TID    enoxaparin  40 mg Subcutaneous Daily    ertapenem (INVANZ) IVPB  1 g Intravenous Q24H    gabapentin  200 mg Oral QHS    linezolid  600 mg Oral Q12H    oxybutynin  10 mg Oral Daily    tamsulosin  0.4 mg  Oral Daily     Continuous Infusions:  As Needed:  acetaminophen, dextrose, dextrose, melatonin, naloxone, ondansetron, oxyCODONE, prochlorperazine, sodium chloride 0.9%, sodium chloride 0.9%    Assessment and Plan  / Problems managed today    * Urinary tract infection associated with nephrostomy catheter  -Pt. 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 IV vancomycin and ertapenem started in ED, will continue vanc and consult ID for further abx recommendations. Repeat cultures ordered      CKD (chronic kidney disease), stage III  -Cr stable at 1.3, no acute issues      Ileostomy in place  -Pt. transverse colon conduit 2020 complicated by bowel perforation requiring hemicolectomy and ileostomy. No acute issues, ostomy bag in place  - recurring ostomy leakage affecting her quality fo life  - patient's  asked if this is reversible. Told him the urostomy is not reversible due to long-term damage from pelvic radiation for cervical cancer. Review CRS notes on events lead up to and operative note of ileostomy creation. She had much of colon removed and much adhesions. Likely not reversible. Will discuss with CRS if reversible, if so, will refer to outpatient CRS evaluation.    Bilateral ureteral obstruction  -Pt. S/p transverson colono conduit and B/L nephrostomy tubes, now with owrsening R sided hydronephrosis  -Urology consulted, R PCNL (new access with IR arranged at the same time), R antegrade URS, stone basket extraction 4/21  -post-operative pain   Multi-modal control   Acetaminophen 100 mg TID, gabapentin 200 mg TID, oxybutynin ER 10 mg daily and tamsulosin 0.4 mg daily    Discharge Planning   OK: 4/22/2023     Code Status: Full Code   Is the patient medically ready for discharge?:     Reason for patient still in hospital (select all that apply): Patient trending condition  Discharge Plan A: Home with  family      Diet:  regular diet  GI PPx: not needed  DVT PPx:  enoxaparin  Drains: ileal conduit, bilateral nephrostomy  Airways: room air  Wounds: percutaneous drain sites    Goals of Care:  Return to prior functional status     Time (minutes) spent in care of the patient including review of tests, flow sheets and notes since last visit, face to face contact, placing orders, communicating with consultants if needed, care coordination, and documentation: 35 min.    Lucero Bah MD

## 2023-04-23 NOTE — PROGRESS NOTES
Hospital Medicine  Progress note    Team: [facility] HOSP MED R Lucero Bah MD  Admit Date: 4/16/2023    Principal Problem:  Urinary tract infection associated with nephrostomy catheter    Interval hx:  Pain better controlled. No new complaints    ROS   Respiratory: neg for cough neg for shortness of breath  Cardiovascular: neg for chest pain neg for palpitations  Gastrointestinal: neg for nausea neg for vomiting, neg for abdominal pain neg for diarrhea neg for constipation   Behavioral/Psych: neg for depression neg for anxiety    PEx  Temp:  [96 °F (35.6 °C)-98 °F (36.7 °C)]   Pulse:  [73-81]   Resp:  [14-18]   BP: (104-117)/(56-66)   SpO2:  [93 %-95 %]     Intake/Output Summary (Last 24 hours) at 4/23/2023 1658  Last data filed at 4/23/2023 1300  Gross per 24 hour   Intake --   Output 2225 ml   Net -2225 ml       General Appearance: no acute distress, WDWN  Heart: regular rate and rhythm, no heave  Respiratory: Normal respiratory effort, symmetric excursion, bilateral vesicular breath sounds   Abdomen: Soft, non-tender; bowel sounds active  Skin: intact, no rash, no ulcers  Neurologic:  No focal numbness or weakness  Mental status: Alert, oriented x 4, affect appropriate     Recent Labs   Lab 04/17/23  0357   WBC 8.21   HGB 11.7*   HCT 39.0          Recent Labs   Lab 04/17/23  0357 04/22/23  0828 04/23/23  0802    136 136   K 3.9 4.3 4.6    104 106   CO2 18* 21* 22*   BUN 14 22* 27*   CREATININE 1.1 1.4 1.9*   GLU 76 137* 100   CALCIUM 9.4 9.7 9.1       Recent Labs   Lab 04/16/23  1741 04/17/23  0357   ALKPHOS 115 120   ALT 8* 12   AST 15 19   ALBUMIN 3.2* 3.1*   PROT 6.6 6.7   BILITOT 0.3 0.4          Scheduled Meds:   acetaminophen  1,000 mg Oral TID    enoxaparin  40 mg Subcutaneous Daily    ertapenem (INVANZ) IVPB  1 g Intravenous Q24H    gabapentin  200 mg Oral QHS    linezolid  600 mg Oral Q12H    oxybutynin  10 mg Oral Daily    tamsulosin  0.4 mg Oral Daily     Continuous Infusions:  As  Needed:  acetaminophen, dextrose, dextrose, melatonin, naloxone, ondansetron, oxyCODONE, prochlorperazine, sodium chloride 0.9%, sodium chloride 0.9%    Assessment and Plan  / Problems managed today    * Urinary tract infection associated with nephrostomy catheter  -Pt. 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 IV vancomycin and ertapenem started in ED, will continue vanc and consult ID for further abx recommendations. Repeat cultures ordered      CKD (chronic kidney disease), stage III  -Cr stable at 1.3, no acute issues      Ileostomy in place  -Pt. transverse colon conduit 2020 complicated by bowel perforation requiring hemicolectomy and ileostomy. No acute issues, ostomy bag in place  - recurring ostomy leakage affecting her quality fo life  - patient's  asked if this is reversible. Told him the urostomy is not reversible due to long-term damage from pelvic radiation for cervical cancer. Review CRS notes on events lead up to and operative note of ileostomy creation. She had much of colon removed and much adhesions. Likely not reversible. Will discuss with CRS if reversible, if so, will refer to outpatient CRS evaluation.    Bilateral ureteral obstruction  -Pt. S/p transverson colono conduit and B/L nephrostomy tubes, now with owrsening R sided hydronephrosis  -Urology consulted, R PCNL (new access with IR arranged at the same time), R antegrade URS, stone basket extraction 4/21  -post-operative pain   Multi-modal control   Acetaminophen 100 mg TID, gabapentin 200 mg TID, oxybutynin ER 10 mg daily and tamsulosin 0.4 mg daily    Discharge Planning   OK: 4/22/2023     Code Status: Full Code   Is the patient medically ready for discharge?:     Reason for patient still in hospital (select all that apply): Patient trending condition  Discharge Plan A: Home with family      Diet:  regular diet  GI PPx: not  needed  DVT PPx:  enoxaparin  Drains: ileal conduit, bilateral nephrostomy, ileostomy  Airways: room air  Wounds: ostomy and percutaneous drain sites    Goals of Care:  Return to prior functional status     Time (minutes) spent in care of the patient including review of tests, flow sheets and notes since last visit, face to face contact, placing orders, communicating with consultants if needed, care coordination, and documentation: 35 min.    Lucero Bah MD

## 2023-04-23 NOTE — ASSESSMENT & PLAN NOTE
Edita Hardin [facility] is a 57 yo F with PMHx distal ureteral strictures 2/2 XRT for cervical cancer, s/p transverse colon conduit and B/L nephrostomy tubes complicated by MDR infections who presents to the ED complaining of R sided flank pain since last night.  She is scheduled for R PCNL (new access with IR), R antegrade URS, stone basket extraction planned for mid ureteral stone on 4/21.     - ID following, ucx growing enterococcus, blood cx neg  - currently on ertapenem   - S/p R PCNL 4/21.  - PRN antiemetics, MM pain control - recommend additional analgesics for the immediate post-operative period.  - Clamping trial of her bilateral nephrostomy tubes begun on 4/22/23 - no increased pain.  - Continue clamping trial.  - BMP in process, follow up Cr.   - If Cr is stable or improves and she remains pain free we will consider removal of nephrostomy tubes tomorrow or prior to discharge.   - rest of care per primary

## 2023-04-23 NOTE — PROGRESS NOTES
Hospital Medicine  Progress note    Team: [facility] [place] HOSP MED R Lucero Bah MD  Admit Date: 4/16/2023    Principal Problem:  Urinary tract infection associated with nephrostomy catheter    Interval hx:  Pain not controlled last night. No new complaints    ROS   Respiratory: neg for cough neg for shortness of breath  Cardiovascular: neg for chest pain neg for palpitations  Gastrointestinal: neg for nausea neg for vomiting, neg for abdominal pain neg for diarrhea neg for constipation   Behavioral/Psych: neg for depression neg for anxiety    PEx  Temp:  [96 °F (35.6 °C)-98 °F (36.7 °C)]   Pulse:  [73-81]   Resp:  [14-18]   BP: (104-117)/(56-66)   SpO2:  [93 %-95 %]     Intake/Output Summary (Last 24 hours) at 4/23/2023 1657  Last data filed at 4/23/2023 1300  Gross per 24 hour   Intake --   Output 2225 ml   Net -2225 ml       General Appearance: no acute distress, WDWN  Heart: regular rate and rhythm, no heave  Respiratory: Normal respiratory effort, symmetric excursion, bilateral vesicular breath sounds   Abdomen: Soft, non-tender; bowel sounds active  Skin: intact, no rash, no ulcers  Neurologic:  No focal numbness or weakness  Mental status: Alert, oriented x 4, affect appropriate     Recent Labs   Lab 04/17/23  0357   WBC 8.21   HGB 11.7*   HCT 39.0          Recent Labs   Lab 04/17/23  0357 04/22/23  0828 04/23/23  0802    136 136   K 3.9 4.3 4.6    104 106   CO2 18* 21* 22*   BUN 14 22* 27*   CREATININE 1.1 1.4 1.9*   GLU 76 137* 100   CALCIUM 9.4 9.7 9.1       Recent Labs   Lab 04/16/23  1741 04/17/23  0357   ALKPHOS 115 120   ALT 8* 12   AST 15 19   ALBUMIN 3.2* 3.1*   PROT 6.6 6.7   BILITOT 0.3 0.4          Scheduled Meds:   acetaminophen  1,000 mg Oral TID    enoxaparin  40 mg Subcutaneous Daily    ertapenem (INVANZ) IVPB  1 g Intravenous Q24H    gabapentin  200 mg Oral QHS    linezolid  600 mg Oral Q12H    oxybutynin  10 mg Oral Daily    tamsulosin  0.4 mg Oral Daily     Continuous  Infusions:  As Needed:  acetaminophen, dextrose, dextrose, melatonin, naloxone, ondansetron, oxyCODONE, prochlorperazine, sodium chloride 0.9%, sodium chloride 0.9%    Assessment and Plan  / Problems managed today    * Urinary tract infection associated with nephrostomy catheter  -Pt. 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 IV vancomycin and ertapenem started in ED, will continue vanc and consult ID for further abx recommendations. Repeat cultures ordered      CKD (chronic kidney disease), stage III  -Cr stable at 1.3, no acute issues      Ileostomy in place  -Pt. transverse colon conduit 2020 complicated by bowel perforation requiring hemicolectomy and ileostomy. No acute issues, ostomy bag in place  - recurring ostomy leakage affecting her quality fo life  - patient's  asked if this is reversible. Told him the urostomy is not reversible due to long-term damage from pelvic radiation for cervical cancer. Review CRS notes on events lead up to and operative note of ileostomy creation. She had much of colon removed and much adhesions. Likely not reversible. Will discuss with CRS if reversible, if so, will refer to outpatient CRS evaluation.    Bilateral ureteral obstruction  -Pt. S/p transverson colono conduit and B/L nephrostomy tubes, now with owrsening R sided hydronephrosis  -Urology consulted, R PCNL (new access with IR arranged at the same time), R antegrade URS, stone basket extraction 4/21  -post-operative pain   Multi-modal control   Acetaminophen 100 mg TID, gabapentin 200 mg TID, oxybutynin ER 10 mg daily and tamsulosin 0.4 mg daily    Discharge Planning   OK: 4/22/2023     Code Status: Full Code   Is the patient medically ready for discharge?:     Reason for patient still in hospital (select all that apply): Patient trending condition  Discharge Plan A: Home with family      Diet:  regular  diet  GI PPx: not needed  DVT PPx:  enoxaparin  Drains: ileal conduit, bilateral nephrostomy  Airways: room air  Wounds: percutaneous drain sites    Goals of Care:  Return to prior functional status     Time (minutes) spent in care of the patient including review of tests, flow sheets and notes since last visit, face to face contact, placing orders, communicating with consultants if needed, care coordination, and documentation: 35 min.    Lucero Bah MD

## 2023-04-23 NOTE — SUBJECTIVE & OBJECTIVE
Interval History: NAEON. AFVSS. Post-op right flank pain improved and controlled by medicine. Clamping trial of bilateral neph tubes begun yesterday, no increased pain. Tolerating PO. Ambulating. Good UOP.    Objective:     Temp:  [96 °F (35.6 °C)-98 °F (36.7 °C)] 98 °F (36.7 °C)  Pulse:  [73-94] 73  Resp:  [16-18] 16  SpO2:  [90 %-98 %] 94 %  BP: (106-127)/(54-66) 111/57     Body mass index is 29.09 kg/m².           Drains       Drain  Duration                  Urostomy 09/11/20 0000 ileal conduit  days         Ileostomy 09/18/20  days         Nephrostomy 02/14/23 Right 66 days         Nephrostomy 03/28/23 0953 Left 12 Fr. 23 days         Nephrostomy 03/28/23 0953 Right 12 Fr. 23 days                    Physical Exam  Vitals reviewed.   Constitutional:       General: She is not in acute distress.     Appearance: She is not toxic-appearing or diaphoretic.   HENT:      Head: Normocephalic and atraumatic.      Nose: Nose normal.   Eyes:      Conjunctiva/sclera: Conjunctivae normal.   Cardiovascular:      Rate and Rhythm: Normal rate.   Pulmonary:      Effort: Pulmonary effort is normal. No respiratory distress.   Abdominal:      General: Abdomen is flat. There is no distension.      Palpations: Abdomen is soft.      Comments: Urostomy draining c/y/u.  Bilateral nephrostomy tubes clamped. Right neph tube dressing urine and blood tinged   Musculoskeletal:         General: No swelling or deformity. Normal range of motion.      Cervical back: Normal range of motion.   Skin:     General: Skin is warm and dry.      Findings: No erythema.   Neurological:      General: No focal deficit present.      Mental Status: She is alert.      Motor: No weakness.   Psychiatric:         Mood and Affect: Mood normal.         Behavior: Behavior normal.         Thought Content: Thought content normal.       Significant Labs:    BMP:  Recent Labs   Lab 04/16/23  1741 04/17/23  0357 04/22/23  0828    137 136   K 4.4 3.9  4.3    110 104   CO2 18* 18* 21*   BUN 16 14 22*   CREATININE 1.1 1.1 1.4   CALCIUM 9.3 9.4 9.7         CBC:   Recent Labs   Lab 04/16/23  1621 04/17/23  0357   WBC 11.54 8.21   HGB 13.1 11.7*   HCT 44.1 39.0    231         All pertinent labs results from the past 24 hours have been reviewed.    Significant Imaging:  All pertinent imaging results/findings from the past 24 hours have been reviewed.

## 2023-04-23 NOTE — PROGRESS NOTES
Urology Progress Note    Patient seen and examined.    Cr rising, up to 1.9 today. She has failed clamping trial of bilateral neph tubes. Left nephrostomy tube unclamped today.    - Left neph tube unclamped.  - Right neph tube remains clamped.  - Follow up Cr tomorrow.    Please call with further questions or concerns.    Karina Beverly MD  Urology, PGY-3  [facility]

## 2023-04-23 NOTE — PROGRESS NOTES
[facility]  Urology  Progress Note    Patient Name: Edita Riley  MRN: [id number]  Admission Date: 4/16/2023  Hospital Length of Stay: 5 days  Code Status: Full Code   Attending Provider: Lucero Bah MD   Primary Care Physician: Primary Doctor No    Subjective:     HPI:  Edita Riley is a 59 yo F with PMHx distal ureteral strictures s/p transverse colon conduit and B/L nephrostomy tubes complicated by MDR infections who presents to the ED complaining of R sided flank pain since last night.     She has a history of distal ureteral strictures due to XRT for cervical cancer.  She underwent a transverse colon conduit on 9/11/2020.  The patient had bilateral hydronephrosis with reflux on the right side and a sluggish ureter on the left side. Bilateral PCNTs since Apr 2021.  She underwent a percutaneous antegrade approach on 10/27/2022 for ureteral stones.     She had a CT scan done on one of her ER visits on 12/2/2022 which showed a 2 mm stone in the right ureter and a 6 mm stone in the lower pole.  The left NT was accidentally removed on 2/13/2023 and it was replaced on 2/14/2023.  The right was replaced at the same time, per IR's note. Return visit to the ED 3/1/23 for dislodged L NT which was replaced on 3/2/23.  CT shows bilateral nephrostomy tubes in correct anatomic position, new onset right sided hydronephrosis, no hydro on the left, 4mm stone in the mid right ureter with proximal hydroureter, small lower pole renal stones in the right kidney.     She was recently seen in urology clinic and noted to have worsening R sided hydronephrosis with recent CT revealing Right NT appearing to be bypassing the renal parenchyma and directly entering the renal pelvis.  She also has a 4 mm R mid ureteral stone. She was initially planned to be direct admitted tomorrow for IV abx prior to R PCNL (new access with IR), R antegrade URS, stone basket extraction planned for 4/21.  Bilateral PCNT last  exchanged on 3/28.      On assessment, she is AFVSS.  She denies fevers, chills.  Since admission overnight, R NT w/ 90 cc of output and urostomy with 300 cc of output.  WBC 8, Cr 1.1 at baseline.  UA significant for 38 RBCs, 63 WBCs, moderate bacteria.  Blood cx and urine cx in process.  She is currently on vanc/ertapenem and ID has been consulted.                     Interval History: NAEON. AFVSS. Post-op right flank pain improved and controlled by medicine. Clamping trial of bilateral neph tubes begun yesterday, no increased pain. Tolerating PO. Ambulating. Good UOP.    Objective:     Temp:  [96 °F (35.6 °C)-98 °F (36.7 °C)] 98 °F (36.7 °C)  Pulse:  [73-94] 73  Resp:  [16-18] 16  SpO2:  [90 %-98 %] 94 %  BP: (106-127)/(54-66) 111/57     Body mass index is 29.09 kg/m².           Drains       Drain  Duration                  Urostomy 09/11/20 0000 ileal conduit  days         Ileostomy 09/18/20  days         Nephrostomy 02/14/23 Right 66 days         Nephrostomy 03/28/23 0953 Left 12 Fr. 23 days         Nephrostomy 03/28/23 0953 Right 12 Fr. 23 days                    Physical Exam  Vitals reviewed.   Constitutional:       General: She is not in acute distress.     Appearance: She is not toxic-appearing or diaphoretic.   HENT:      Head: Normocephalic and atraumatic.      Nose: Nose normal.   Eyes:      Conjunctiva/sclera: Conjunctivae normal.   Cardiovascular:      Rate and Rhythm: Normal rate.   Pulmonary:      Effort: Pulmonary effort is normal. No respiratory distress.   Abdominal:      General: Abdomen is flat. There is no distension.      Palpations: Abdomen is soft.      Comments: Urostomy draining c/y/u.  Bilateral nephrostomy tubes clamped. Right neph tube dressing urine and blood tinged   Musculoskeletal:         General: No swelling or deformity. Normal range of motion.      Cervical back: Normal range of motion.   Skin:     General: Skin is warm and dry.      Findings: No erythema.    Neurological:      General: No focal deficit present.      Mental Status: She is alert.      Motor: No weakness.   Psychiatric:         Mood and Affect: Mood normal.         Behavior: Behavior normal.         Thought Content: Thought content normal.       Significant Labs:    BMP:  Recent Labs   Lab 04/16/23  1741 04/17/23  0357 04/22/23  0828    137 136   K 4.4 3.9 4.3    110 104   CO2 18* 18* 21*   BUN 16 14 22*   CREATININE 1.1 1.1 1.4   CALCIUM 9.3 9.4 9.7         CBC:   Recent Labs   Lab 04/16/23  1621 04/17/23  0357   WBC 11.54 8.21   HGB 13.1 11.7*   HCT 44.1 39.0    231         All pertinent labs results from the past 24 hours have been reviewed.    Significant Imaging:  All pertinent imaging results/findings from the past 24 hours have been reviewed.          Assessment/Plan:     Bilateral ureteral obstruction  Edita Riley is a 59 yo F with PMHx distal ureteral strictures 2/2 XRT for cervical cancer, s/p transverse colon conduit and B/L nephrostomy tubes complicated by MDR infections who presents to the ED complaining of R sided flank pain since last night.  She is scheduled for R PCNL (new access with IR), R antegrade URS, stone basket extraction planned for mid ureteral stone on 4/21.     - ID following, ucx growing enterococcus, blood cx neg  - currently on ertapenem   - S/p R PCNL 4/21.  - PRN antiemetics, MM pain control - recommend additional analgesics for the immediate post-operative period.  - Clamping trial of her bilateral nephrostomy tubes begun on 4/22/23 - no increased pain.  - Continue clamping trial.  - BMP in process, follow up Cr.   - If Cr is stable or improves and she remains pain free we will consider removal of nephrostomy tubes tomorrow or prior to discharge.   - rest of care per primary            VTE Risk Mitigation (From admission, onward)         Ordered     enoxaparin injection 40 mg  Daily         04/21/23 1115     IP VTE HIGH RISK PATIENT   Once         04/16/23 1930     Place sequential compression device  Until discontinued         04/16/23 1930                Karina Beverly MD  Urology  [facility] - Surgery

## 2023-04-24 LAB
ALBUMIN SERPL BCP-MCNC: 2.6 G/DL (ref 3.5–5.2)
ANION GAP SERPL CALC-SCNC: 5 MMOL/L (ref 8–16)
ANION GAP SERPL CALC-SCNC: 5 MMOL/L (ref 8–16)
BUN SERPL-MCNC: 22 MG/DL (ref 6–20)
BUN SERPL-MCNC: 22 MG/DL (ref 6–20)
CALCIUM SERPL-MCNC: 9 MG/DL (ref 8.7–10.5)
CALCIUM SERPL-MCNC: 9 MG/DL (ref 8.7–10.5)
CHLORIDE SERPL-SCNC: 107 MMOL/L (ref 95–110)
CHLORIDE SERPL-SCNC: 107 MMOL/L (ref 95–110)
CO2 SERPL-SCNC: 25 MMOL/L (ref 23–29)
CO2 SERPL-SCNC: 25 MMOL/L (ref 23–29)
CREAT SERPL-MCNC: 1.5 MG/DL (ref 0.5–1.4)
CREAT SERPL-MCNC: 1.5 MG/DL (ref 0.5–1.4)
EST. GFR  (NO RACE VARIABLE): 39.6 ML/MIN/1.73 M^2
EST. GFR  (NO RACE VARIABLE): 39.6 ML/MIN/1.73 M^2
GLUCOSE SERPL-MCNC: 97 MG/DL (ref 70–110)
GLUCOSE SERPL-MCNC: 97 MG/DL (ref 70–110)
MAGNESIUM SERPL-MCNC: 1.8 MG/DL (ref 1.6–2.6)
PHOSPHATE SERPL-MCNC: 2.6 MG/DL (ref 2.7–4.5)
POTASSIUM SERPL-SCNC: 4.8 MMOL/L (ref 3.5–5.1)
POTASSIUM SERPL-SCNC: 4.8 MMOL/L (ref 3.5–5.1)
SODIUM SERPL-SCNC: 137 MMOL/L (ref 136–145)
SODIUM SERPL-SCNC: 137 MMOL/L (ref 136–145)

## 2023-04-24 PROCEDURE — 80069 RENAL FUNCTION PANEL: CPT | Performed by: INTERNAL MEDICINE

## 2023-04-24 PROCEDURE — 36415 COLL VENOUS BLD VENIPUNCTURE: CPT | Performed by: INTERNAL MEDICINE

## 2023-04-24 PROCEDURE — 11000001 HC ACUTE MED/SURG PRIVATE ROOM

## 2023-04-24 PROCEDURE — 63600175 PHARM REV CODE 636 W HCPCS: Performed by: HOSPITALIST

## 2023-04-24 PROCEDURE — 25000003 PHARM REV CODE 250: Performed by: PHYSICIAN ASSISTANT

## 2023-04-24 PROCEDURE — 99233 SBSQ HOSP IP/OBS HIGH 50: CPT | Mod: ,,, | Performed by: INTERNAL MEDICINE

## 2023-04-24 PROCEDURE — 94761 N-INVAS EAR/PLS OXIMETRY MLT: CPT

## 2023-04-24 PROCEDURE — 25000003 PHARM REV CODE 250: Performed by: HOSPITALIST

## 2023-04-24 PROCEDURE — 63600175 PHARM REV CODE 636 W HCPCS: Performed by: INTERNAL MEDICINE

## 2023-04-24 PROCEDURE — 25000003 PHARM REV CODE 250: Performed by: INTERNAL MEDICINE

## 2023-04-24 PROCEDURE — 27000207 HC ISOLATION

## 2023-04-24 PROCEDURE — 83735 ASSAY OF MAGNESIUM: CPT | Performed by: INTERNAL MEDICINE

## 2023-04-24 PROCEDURE — 63600175 PHARM REV CODE 636 W HCPCS: Performed by: PHYSICIAN ASSISTANT

## 2023-04-24 PROCEDURE — 99233 PR SUBSEQUENT HOSPITAL CARE,LEVL III: ICD-10-PCS | Mod: ,,, | Performed by: INTERNAL MEDICINE

## 2023-04-24 RX ORDER — SODIUM,POTASSIUM PHOSPHATES 280-250MG
2 POWDER IN PACKET (EA) ORAL ONCE
Status: DISCONTINUED | OUTPATIENT
Start: 2023-04-24 | End: 2023-04-24

## 2023-04-24 RX ADMIN — LINEZOLID 600 MG: 600 TABLET, FILM COATED ORAL at 09:04

## 2023-04-24 RX ADMIN — ACETAMINOPHEN 1000 MG: 500 TABLET ORAL at 09:04

## 2023-04-24 RX ADMIN — Medication 2 TABLET: at 04:04

## 2023-04-24 RX ADMIN — GABAPENTIN 200 MG: 100 CAPSULE ORAL at 09:04

## 2023-04-24 RX ADMIN — OXYCODONE HYDROCHLORIDE 10 MG: 10 TABLET ORAL at 09:04

## 2023-04-24 RX ADMIN — OXYBUTYNIN CHLORIDE 10 MG: 10 TABLET, EXTENDED RELEASE ORAL at 09:04

## 2023-04-24 RX ADMIN — TAMSULOSIN HYDROCHLORIDE 0.4 MG: 0.4 CAPSULE ORAL at 09:04

## 2023-04-24 RX ADMIN — ENOXAPARIN SODIUM 40 MG: 40 INJECTION SUBCUTANEOUS at 04:04

## 2023-04-24 RX ADMIN — ONDANSETRON 4 MG: 2 INJECTION INTRAMUSCULAR; INTRAVENOUS at 12:04

## 2023-04-24 RX ADMIN — ACETAMINOPHEN 1000 MG: 500 TABLET ORAL at 02:04

## 2023-04-24 RX ADMIN — ERTAPENEM 1 G: 1 INJECTION INTRAMUSCULAR; INTRAVENOUS at 02:04

## 2023-04-24 RX ADMIN — OXYCODONE HYDROCHLORIDE 10 MG: 10 TABLET ORAL at 02:04

## 2023-04-24 RX ADMIN — Medication 2 TABLET: at 09:04

## 2023-04-24 NOTE — PLAN OF CARE
Pt is AAOx4. Flat affect. VSS. No falls/injury as pt calls for assistance when needed. Fall precautions in place. No s/s of skin breakdown as pt is independent with positioning self, pt states she is ambulatory. Pain being monitored and controlled with PRN meds. Antibiotics given. Ileostomy bag changed today, good stool output noted. Urostomy emptied and output recorded. Right neph tube remained clamped. Output from left tube recorded. Bed in lowest position. Call light within reach. Will continue to monitor.       Problem: Adult Inpatient Plan of Care  Goal: Plan of Care Review  Outcome: Ongoing, Progressing     Problem: Fall Injury Risk  Goal: Absence of Fall and Fall-Related Injury  Outcome: Ongoing, Progressing     Problem: Infection  Goal: Absence of Infection Signs and Symptoms  Outcome: Ongoing, Progressing

## 2023-04-24 NOTE — PROGRESS NOTES
[facility]  Urology  Progress Note    Patient Name: Edita Riley  MRN: [id number]  Admission Date: 4/16/2023  Hospital Length of Stay: 6 days  Code Status: Full Code   Attending Provider: Lucero Bah MD   Primary Care Physician: Primary Doctor No    Subjective:     HPI:  Edita Riley is a 59 yo F with PMHx distal ureteral strictures s/p transverse colon conduit and B/L nephrostomy tubes complicated by MDR infections who presents to the ED complaining of R sided flank pain since last night.     She has a history of distal ureteral strictures due to XRT for cervical cancer.  She underwent a transverse colon conduit on 9/11/2020.  The patient had bilateral hydronephrosis with reflux on the right side and a sluggish ureter on the left side. Bilateral PCNTs since Apr 2021.  She underwent a percutaneous antegrade approach on 10/27/2022 for ureteral stones.     She had a CT scan done on one of her ER visits on 12/2/2022 which showed a 2 mm stone in the right ureter and a 6 mm stone in the lower pole.  The left NT was accidentally removed on 2/13/2023 and it was replaced on 2/14/2023.  The right was replaced at the same time, per IR's note. Return visit to the ED 3/1/23 for dislodged L NT which was replaced on 3/2/23.  CT shows bilateral nephrostomy tubes in correct anatomic position, new onset right sided hydronephrosis, no hydro on the left, 4mm stone in the mid right ureter with proximal hydroureter, small lower pole renal stones in the right kidney.     She was recently seen in urology clinic and noted to have worsening R sided hydronephrosis with recent CT revealing Right NT appearing to be bypassing the renal parenchyma and directly entering the renal pelvis.  She also has a 4 mm R mid ureteral stone. She was initially planned to be direct admitted tomorrow for IV abx prior to R PCNL (new access with IR), R antegrade URS, stone basket extraction planned for 4/21.  Bilateral PCNT last  exchanged on 3/28.      On assessment, she is AFVSS.  She denies fevers, chills.  Since admission overnight, R NT w/ 90 cc of output and urostomy with 300 cc of output.  WBC 8, Cr 1.1 at baseline.  UA significant for 38 RBCs, 63 WBCs, moderate bacteria.  Blood cx and urine cx in process.  She is currently on vanc/ertapenem and ID has been consulted.                     Interval History: NAEO. AFVSS. Pain improved with left neph tube unclamped. Right neph tube clamped on am rounds. Tolerating diet, no n/v. Cr improved.      Objective:     Temp:  [96.5 °F (35.8 °C)-98 °F (36.7 °C)] 97.7 °F (36.5 °C)  Pulse:  [73-80] 77  Resp:  [14-20] 20  SpO2:  [93 %-98 %] 94 %  BP: ()/(52-59) 97/54     Body mass index is 29.09 kg/m².           Drains       Drain  Duration                  Urostomy 09/11/20 0000 ileal conduit  days         Ileostomy 09/18/20  days         Nephrostomy 03/28/23 0953 Left 12 Fr. 26 days         Nephrostomy 04/21/23 1419 Right 8 Fr. 2 days                    Physical Exam  Vitals reviewed.   Constitutional:       General: She is not in acute distress.     Appearance: She is not toxic-appearing or diaphoretic.   HENT:      Head: Normocephalic and atraumatic.      Nose: Nose normal.   Eyes:      Conjunctiva/sclera: Conjunctivae normal.   Cardiovascular:      Rate and Rhythm: Normal rate.   Pulmonary:      Effort: Pulmonary effort is normal. No respiratory distress.   Abdominal:      General: Abdomen is flat. There is no distension.      Palpations: Abdomen is soft.      Comments: Urostomy draining c/y/u.  Right nephrostomy tube clamped, left nephrostomy open to drainage. Right neph tube dressing urine and blood tinged.   Musculoskeletal:         General: No swelling or deformity. Normal range of motion.      Cervical back: Normal range of motion.   Skin:     General: Skin is warm and dry.      Findings: No erythema.   Neurological:      General: No focal deficit present.      Mental  Status: She is alert.      Motor: No weakness.   Psychiatric:         Mood and Affect: Mood normal.         Behavior: Behavior normal.         Thought Content: Thought content normal.       Significant Labs:    BMP:  Recent Labs   Lab 04/22/23  0828 04/23/23  0802 04/24/23  0338    136 137  137   K 4.3 4.6 4.8  4.8    106 107  107   CO2 21* 22* 25  25   BUN 22* 27* 22*  22*   CREATININE 1.4 1.9* 1.5*  1.5*   CALCIUM 9.7 9.1 9.0  9.0       CBC:   No results for input(s): WBC, HGB, HCT, PLT in the last 168 hours.    Urine Culture:   Recent Labs   Lab 04/18/23  0629   LABURIN ENTEROCOCCUS FAECIUM VRE  50,000 - 99,999 cfu/ml  *     Urine Studies: No results for input(s): COLORU, APPEARANCEUA, PHUR, SPECGRAV, PROTEINUA, GLUCUA, KETONESU, BILIRUBINUA, OCCULTUA, NITRITE, UROBILINOGEN, LEUKOCYTESUR, RBCUA, WBCUA, BACTERIA, SQUAMEPITHEL, HYALINECASTS in the last 168 hours.    Invalid input(s): WRIGHTSUR    Significant Imaging:  All pertinent imaging results/findings from the past 24 hours have been reviewed.        Assessment/Plan:     Bilateral ureteral obstruction  Edita Riley is a 57 yo F with PMHx distal ureteral strictures 2/2 XRT for cervical cancer, s/p transverse colon conduit and B/L nephrostomy tubes complicated by MDR infections who presents to the ED complaining of R sided flank pain since last night.  She is scheduled for R PCNL (new access with IR), R antegrade URS, stone basket extraction planned for mid ureteral stone on 4/21.     - ID following, ucx growing enterococcus, blood cx neg  - currently on ertapenem   - S/p R PCNL 4/21.  - PRN antiemetics, MM pain control -  - Clamping trial of right nephrostomy tube 4/24. If Cr stable on 4/25, will consider removal  - Left nephrostomy tube to remain unclamped and maintained upon discharge  - Cr improved 1.5 from 1.9  - rest of care per primary      Dispo: Recheck Cr 4/25 after clamping trial of R PCNT          VTE Risk Mitigation  (From admission, onward)           Ordered     enoxaparin injection 40 mg  Daily         04/21/23 1115     IP VTE HIGH RISK PATIENT  Once         04/16/23 1930     Place sequential compression device  Until discontinued         04/16/23 1930                    Dave Tatum MD  Urology  [facility] - Surgery

## 2023-04-24 NOTE — ASSESSMENT & PLAN NOTE
Edita Hardin [facility] is a 59 yo F with PMHx distal ureteral strictures 2/2 XRT for cervical cancer, s/p transverse colon conduit and B/L nephrostomy tubes complicated by MDR infections who presents to the ED complaining of R sided flank pain since last night.  She is scheduled for R PCNL (new access with IR), R antegrade URS, stone basket extraction planned for mid ureteral stone on 4/21.     - ID following, ucx growing enterococcus, blood cx neg  - currently on ertapenem   - S/p R PCNL 4/21.  - PRN antiemetics, MM pain control -  - Clamping trial of right nephrostomy tube 4/24. If Cr stable on 4/25, will consider removal  - Left nephrostomy tube to remain unclamped and maintained upon discharge  - Cr improved 1.5 from 1.8  - rest of care per primary      Dispo: Recheck Cr 4/25 after clamping trial of R PCNT

## 2023-04-24 NOTE — SUBJECTIVE & OBJECTIVE
Interval History: NAEO. AFVSS. Pain improved with left neph tube unclamped. Right neph tube clamped on am rounds. Tolerating diet, no n/v. Cr improved.      Objective:     Temp:  [96.5 °F (35.8 °C)-98 °F (36.7 °C)] 97.7 °F (36.5 °C)  Pulse:  [73-80] 77  Resp:  [14-20] 20  SpO2:  [93 %-98 %] 94 %  BP: ()/(52-59) 97/54     Body mass index is 29.09 kg/m².           Drains       Drain  Duration                  Urostomy 09/11/20 0000 ileal conduit  days         Ileostomy 09/18/20  days         Nephrostomy 03/28/23 0953 Left 12 Fr. 26 days         Nephrostomy 04/21/23 1419 Right 8 Fr. 2 days                    Physical Exam  Vitals reviewed.   Constitutional:       General: She is not in acute distress.     Appearance: She is not toxic-appearing or diaphoretic.   HENT:      Head: Normocephalic and atraumatic.      Nose: Nose normal.   Eyes:      Conjunctiva/sclera: Conjunctivae normal.   Cardiovascular:      Rate and Rhythm: Normal rate.   Pulmonary:      Effort: Pulmonary effort is normal. No respiratory distress.   Abdominal:      General: Abdomen is flat. There is no distension.      Palpations: Abdomen is soft.      Comments: Urostomy draining c/y/u.  Right nephrostomy tube clamped, left nephrostomy open to drainage. Right neph tube dressing urine and blood tinged.   Musculoskeletal:         General: No swelling or deformity. Normal range of motion.      Cervical back: Normal range of motion.   Skin:     General: Skin is warm and dry.      Findings: No erythema.   Neurological:      General: No focal deficit present.      Mental Status: She is alert.      Motor: No weakness.   Psychiatric:         Mood and Affect: Mood normal.         Behavior: Behavior normal.         Thought Content: Thought content normal.       Significant Labs:    BMP:  Recent Labs   Lab 04/22/23  0828 04/23/23  0802 04/24/23  0338    136 137  137   K 4.3 4.6 4.8  4.8    106 107  107   CO2 21* 22* 25  25   BUN  22* 27* 22*  22*   CREATININE 1.4 1.9* 1.5*  1.5*   CALCIUM 9.7 9.1 9.0  9.0       CBC:   No results for input(s): WBC, HGB, HCT, PLT in the last 168 hours.    Urine Culture:   Recent Labs   Lab 04/18/23  0629   LABURIN ENTEROCOCCUS FAECIUM VRE  50,000 - 99,999 cfu/ml  *     Urine Studies: No results for input(s): COLORU, APPEARANCEUA, PHUR, SPECGRAV, PROTEINUA, GLUCUA, KETONESU, BILIRUBINUA, OCCULTUA, NITRITE, UROBILINOGEN, LEUKOCYTESUR, RBCUA, WBCUA, BACTERIA, SQUAMEPITHEL, HYALINECASTS in the last 168 hours.    Invalid input(s): NICK    Significant Imaging:  All pertinent imaging results/findings from the past 24 hours have been reviewed.

## 2023-04-24 NOTE — PROGRESS NOTES
Hospital Medicine  Progress note    Team: [facility]OSP MED R Lucero Bah MD  Admit Date: 4/16/2023    Principal Problem:  Urinary tract infection associated with nephrostomy catheter    Interval hx: Pain controlled. No new complaints    ROS   Respiratory: neg for cough neg for shortness of breath  Cardiovascular: neg for chest pain neg for palpitations  Gastrointestinal: neg for nausea neg for vomiting, neg for abdominal pain neg for diarrhea neg for constipation   Behavioral/Psych: neg for depression neg for anxiety    PEx  Temp:  [97.3 °F (36.3 °C)-98.6 °F (37 °C)]   Pulse:  []   Resp:  [16-20]   BP: ()/(50-62)   SpO2:  [94 %-99 %]     Intake/Output Summary (Last 24 hours) at 4/24/2023 1706  Last data filed at 4/24/2023 1418  Gross per 24 hour   Intake 1195 ml   Output 2325 ml   Net -1130 ml     General Appearance: no acute distress, WDWN  Heart: regular rate and rhythm, no heave  Respiratory: Normal respiratory effort, symmetric excursion, bilateral vesicular breath sounds   Abdomen: Soft, non-tender; bowel sounds active  Skin: intact, no rash, no ulcers  Neurologic:  No focal numbness or weakness  Mental status: Alert, oriented x 4, affect appropriate     Recent Labs   Lab 04/22/23  0828 04/23/23  0802 04/24/23  0338    136 137  137   K 4.3 4.6 4.8  4.8    106 107  107   CO2 21* 22* 25  25   BUN 22* 27* 22*  22*   CREATININE 1.4 1.9* 1.5*  1.5*   * 100 97  97   CALCIUM 9.7 9.1 9.0  9.0   MG  --   --  1.8   PHOS  --   --  2.6*       Recent Labs   Lab 04/24/23  0338   ALBUMIN 2.6*       Scheduled Meds:   acetaminophen  1,000 mg Oral TID    enoxaparin  40 mg Subcutaneous Daily    ertapenem (INVANZ) IVPB  1 g Intravenous Q24H    gabapentin  200 mg Oral QHS    k phos di & mono-sod phos mono  500 mg Oral TID WM    linezolid  600 mg Oral Q12H    oxybutynin  10 mg Oral Daily    tamsulosin  0.4 mg Oral Daily     Continuous Infusions:  As Needed:  acetaminophen, dextrose, dextrose,  melatonin, naloxone, ondansetron, oxyCODONE, prochlorperazine, sodium chloride 0.9%, sodium chloride 0.9%    Assessment and Plan  / Problems managed today    * Urinary tract infection associated with nephrostomy catheter  Klesiella variocola infection  Enterococcus faecium  -P With recurrent MDR infections related to nephrostomy tubes and chronic ureteral strictures  -Urine culture 4/10 growing klebsiella and enterococcus. IV vancomycin and ertapenem started in ED  - ID consulted  -Urine cx 4/18 obtained from R nephrostomy tube now growing e faecium VRE.  -Unclear if klebsiella growing from urine cultures obtained from PCN tube collection bag is a colonizer/ contaminant vs true pathogen, but have been covering with ertapenem since 4/16 and did not grow in subsequent culture.   Underwent Rt PCN drain removal, stone extraction and drain replacement  --ertapenem 1 g IV daily for total 10 days. ID recommended Bactrim if leaves before then. Last dose 4/26  --vanc switched to linezolid today to cover e faecium VRE; continue x 10 days. Last day 4/30/23.    CKD (chronic kidney disease), stage III  -Cr stable at 1.3, no acute issues      Ileostomy in place  -Pt. transverse colon conduit 2020 complicated by bowel perforation requiring hemicolectomy and ileostomy. No acute issues, ostomy bag in place  - recurring ostomy leakage affecting her quality fo life  - patient's  asked if this is reversible. Told him the urostomy is not reversible due to long-term damage from pelvic radiation for cervical cancer. Review CRS notes on events lead up to and operative note of ileostomy creation. She had much of colon removed and much adhesions. Likely not reversible. Will discuss with CRS if reversible, if so, will refer to outpatient CRS evaluation.    Bilateral ureteral obstruction  -Pt. S/p transverson colono conduit and B/L nephrostomy tubes, now with owrsening R sided hydronephrosis  -Urology consulted, R PCNL (new access with  IR arranged at the same time), R antegrade URS, stone basket extraction 4/21  -post-operative pain   Multi-modal control   Acetaminophen 100 mg TID, gabapentin 200 mg TID, oxybutynin ER 10 mg daily and tamsulosin 0.4 mg daily\  - urology following daily and determining if nephrostomy tube can be removed prior to discharge. Which would be ideal as cairing for multiple drains have been burdensome for patient.     Discharge Planning   OK: 4/25/2023     Code Status: Full Code   Is the patient medically ready for discharge?:     Reason for patient still in hospital (select all that apply): Patient trending condition  Discharge Plan A: Home with family      Diet:  regular diet  GI PPx: not needed  DVT PPx:  enoxaparin  Drains: ileal conduit, bilateral nephrostomy, ileostomy  Airways: room air  Wounds: ostomy and percutaneous drain sites    Goals of Care:  Return to prior functional status     Time (minutes) spent in care of the patient including review of tests, flow sheets and notes since last visit, face to face contact, placing orders, communicating with consultants if needed, care coordination, and documentation: 35 min.    Lucero Bah MD

## 2023-04-24 NOTE — ASSESSMENT & PLAN NOTE
Klesiella variocola infection  Enterococcus faecium  -P With recurrent MDR infections related to nephrostomy tubes and chronic ureteral strictures  -Urine culture 4/10 growing klebsiella and enterococcus. IV vancomycin and ertapenem started in ED  - ID consulted  -Urine cx 4/18 obtained from R nephrostomy tube now growing e faecium VRE.  -Unclear if klebsiella growing from urine cultures obtained from PCN tube collection bag is a colonizer/ contaminant vs true pathogen, but have been covering with ertapenem since 4/16 and did not grow in subsequent culture.   Underwent Rt PCN drain removal, stone extraction and drain replacement  --ertapenem 1 g IV daily for total 10 days. ID recommended Bactrim if leaves before then. Last dose 4/26   - patient wishes to complete IV antibiotic course prior to discharge (1 more day)  --vanc switched to linezolid today to cover e faecium VRE; continue x 10 days. Last day 4/30/23.

## 2023-04-25 LAB
ALBUMIN SERPL BCP-MCNC: 2.6 G/DL (ref 3.5–5.2)
ANION GAP SERPL CALC-SCNC: 8 MMOL/L (ref 8–16)
BUN SERPL-MCNC: 17 MG/DL (ref 6–20)
CALCIUM SERPL-MCNC: 9.3 MG/DL (ref 8.7–10.5)
CHLORIDE SERPL-SCNC: 108 MMOL/L (ref 95–110)
CO2 SERPL-SCNC: 27 MMOL/L (ref 23–29)
CREAT SERPL-MCNC: 1.2 MG/DL (ref 0.5–1.4)
EST. GFR  (NO RACE VARIABLE): 51.8 ML/MIN/1.73 M^2
GLUCOSE SERPL-MCNC: 89 MG/DL (ref 70–110)
MAGNESIUM SERPL-MCNC: 1.9 MG/DL (ref 1.6–2.6)
PHOSPHATE SERPL-MCNC: 4.5 MG/DL (ref 2.7–4.5)
POTASSIUM SERPL-SCNC: 4.9 MMOL/L (ref 3.5–5.1)
SODIUM SERPL-SCNC: 143 MMOL/L (ref 136–145)

## 2023-04-25 PROCEDURE — 83735 ASSAY OF MAGNESIUM: CPT | Performed by: INTERNAL MEDICINE

## 2023-04-25 PROCEDURE — 27000207 HC ISOLATION

## 2023-04-25 PROCEDURE — 63600175 PHARM REV CODE 636 W HCPCS: Performed by: INTERNAL MEDICINE

## 2023-04-25 PROCEDURE — 80069 RENAL FUNCTION PANEL: CPT | Performed by: INTERNAL MEDICINE

## 2023-04-25 PROCEDURE — 25000003 PHARM REV CODE 250: Performed by: HOSPITALIST

## 2023-04-25 PROCEDURE — 99233 PR SUBSEQUENT HOSPITAL CARE,LEVL III: ICD-10-PCS | Mod: ,,, | Performed by: INTERNAL MEDICINE

## 2023-04-25 PROCEDURE — 11000001 HC ACUTE MED/SURG PRIVATE ROOM

## 2023-04-25 PROCEDURE — 99233 SBSQ HOSP IP/OBS HIGH 50: CPT | Mod: ,,, | Performed by: INTERNAL MEDICINE

## 2023-04-25 PROCEDURE — 25000003 PHARM REV CODE 250: Performed by: INTERNAL MEDICINE

## 2023-04-25 PROCEDURE — 36415 COLL VENOUS BLD VENIPUNCTURE: CPT | Performed by: INTERNAL MEDICINE

## 2023-04-25 RX ORDER — LINEZOLID 600 MG/1
600 TABLET, FILM COATED ORAL EVERY 12 HOURS
Qty: 10 TABLET | Refills: 0 | Status: SHIPPED | OUTPATIENT
Start: 2023-04-25 | End: 2023-04-30

## 2023-04-25 RX ORDER — OXYCODONE AND ACETAMINOPHEN 5; 325 MG/1; MG/1
1 TABLET ORAL EVERY 4 HOURS PRN
Status: DISCONTINUED | OUTPATIENT
Start: 2023-04-25 | End: 2023-04-26 | Stop reason: HOSPADM

## 2023-04-25 RX ORDER — OXYBUTYNIN CHLORIDE 10 MG/1
10 TABLET, EXTENDED RELEASE ORAL DAILY
Qty: 30 TABLET | Refills: 0 | Status: ON HOLD | OUTPATIENT
Start: 2023-04-26 | End: 2023-06-03 | Stop reason: HOSPADM

## 2023-04-25 RX ORDER — ACETAMINOPHEN 500 MG
500 TABLET ORAL EVERY 4 HOURS PRN
Qty: 30 TABLET | Refills: 0 | Status: ON HOLD | OUTPATIENT
Start: 2023-04-25 | End: 2023-05-31

## 2023-04-25 RX ORDER — TAMSULOSIN HYDROCHLORIDE 0.4 MG/1
0.4 CAPSULE ORAL DAILY
Qty: 30 CAPSULE | Refills: 0 | Status: SHIPPED | OUTPATIENT
Start: 2023-04-26 | End: 2023-07-26

## 2023-04-25 RX ADMIN — LINEZOLID 600 MG: 600 TABLET, FILM COATED ORAL at 09:04

## 2023-04-25 RX ADMIN — GABAPENTIN 200 MG: 100 CAPSULE ORAL at 09:04

## 2023-04-25 RX ADMIN — ERTAPENEM 1 G: 1 INJECTION INTRAMUSCULAR; INTRAVENOUS at 02:04

## 2023-04-25 RX ADMIN — ACETAMINOPHEN 1000 MG: 500 TABLET ORAL at 09:04

## 2023-04-25 RX ADMIN — Medication 2 TABLET: at 10:04

## 2023-04-25 RX ADMIN — OXYBUTYNIN CHLORIDE 10 MG: 10 TABLET, EXTENDED RELEASE ORAL at 09:04

## 2023-04-25 RX ADMIN — ACETAMINOPHEN 1000 MG: 500 TABLET ORAL at 02:04

## 2023-04-25 RX ADMIN — ENOXAPARIN SODIUM 40 MG: 40 INJECTION SUBCUTANEOUS at 07:04

## 2023-04-25 RX ADMIN — TAMSULOSIN HYDROCHLORIDE 0.4 MG: 0.4 CAPSULE ORAL at 09:04

## 2023-04-25 NOTE — CONSULTS
Wound care consult received for an ileostomy. The patient stated she is independent in ostomy care. Supplies will be sent to the room. Wound care signing off, re-consult as needed.

## 2023-04-25 NOTE — PROGRESS NOTES
[facility]  Urology  Progress Note    Patient Name: Edita Riley  MRN: [id number]  Admission Date: 4/16/2023  Hospital Length of Stay: 7 days  Code Status: Full Code   Attending Provider: Lucero Bah MD   Primary Care Physician: Primary Doctor No    Subjective:     HPI:  Edita Riley is a 57 yo F with PMHx distal ureteral strictures s/p transverse colon conduit and B/L nephrostomy tubes complicated by MDR infections who presents to the ED complaining of R sided flank pain since last night.     She has a history of distal ureteral strictures due to XRT for cervical cancer.  She underwent a transverse colon conduit on 9/11/2020.  The patient had bilateral hydronephrosis with reflux on the right side and a sluggish ureter on the left side. Bilateral PCNTs since Apr 2021.  She underwent a percutaneous antegrade approach on 10/27/2022 for ureteral stones.     She had a CT scan done on one of her ER visits on 12/2/2022 which showed a 2 mm stone in the right ureter and a 6 mm stone in the lower pole.  The left NT was accidentally removed on 2/13/2023 and it was replaced on 2/14/2023.  The right was replaced at the same time, per IR's note. Return visit to the ED 3/1/23 for dislodged L NT which was replaced on 3/2/23.  CT shows bilateral nephrostomy tubes in correct anatomic position, new onset right sided hydronephrosis, no hydro on the left, 4mm stone in the mid right ureter with proximal hydroureter, small lower pole renal stones in the right kidney.     She was recently seen in urology clinic and noted to have worsening R sided hydronephrosis with recent CT revealing Right NT appearing to be bypassing the renal parenchyma and directly entering the renal pelvis.  She also has a 4 mm R mid ureteral stone. She was initially planned to be direct admitted tomorrow for IV abx prior to R PCNL (new access with IR), R antegrade URS, stone basket extraction planned for 4/21.  Bilateral PCNT last  exchanged on 3/28.      On assessment, she is AFVSS.  She denies fevers, chills.  Since admission overnight, R NT w/ 90 cc of output and urostomy with 300 cc of output.  WBC 8, Cr 1.1 at baseline.  UA significant for 38 RBCs, 63 WBCs, moderate bacteria.  Blood cx and urine cx in process.  She is currently on vanc/ertapenem and ID has been consulted.                     Interval History: NAEO.  AFVSS.  Right neph tube clamped.  Left neph tube draining c/y/u.  AM Cr pending.      L NT: 200/150/-  Urostomy: 425/175/-    Review of Systems: per HPI   Objective:     Temp:  [97.3 °F (36.3 °C)-98.6 °F (37 °C)] 98.1 °F (36.7 °C)  Pulse:  [] 82  Resp:  [16-19] 18  SpO2:  [95 %-99 %] 95 %  BP: (101-124)/(50-62) 112/59     Body mass index is 29.09 kg/m².           Drains       Drain  Duration                  Urostomy 09/11/20 0000 ileal conduit  days         Ileostomy 09/18/20  days         Nephrostomy 03/28/23 0953 Left 12 Fr. 27 days         Nephrostomy 04/21/23 1419 Right 8 Fr. 3 days                    Physical Exam  Vitals reviewed.   Constitutional:       General: She is not in acute distress.     Appearance: She is not toxic-appearing or diaphoretic.   HENT:      Head: Normocephalic and atraumatic.      Nose: Nose normal.   Eyes:      Conjunctiva/sclera: Conjunctivae normal.   Cardiovascular:      Rate and Rhythm: Normal rate.   Pulmonary:      Effort: Pulmonary effort is normal. No respiratory distress.   Abdominal:      General: Abdomen is flat. There is no distension.      Palpations: Abdomen is soft.      Comments: Urostomy draining c/y/u.  Right nephrostomy tube clamped, left nephrostomy open to drainage. Right neph tube dressing urine and blood tinged.   Musculoskeletal:         General: No swelling or deformity. Normal range of motion.      Cervical back: Normal range of motion.   Skin:     General: Skin is warm and dry.      Findings: No erythema.   Neurological:      General: No focal  deficit present.      Mental Status: She is alert.      Motor: No weakness.   Psychiatric:         Mood and Affect: Mood normal.         Behavior: Behavior normal.         Thought Content: Thought content normal.       Significant Labs:    BMP:  Recent Labs   Lab 04/22/23  0828 04/23/23  0802 04/24/23  0338    136 137  137   K 4.3 4.6 4.8  4.8    106 107  107   CO2 21* 22* 25  25   BUN 22* 27* 22*  22*   CREATININE 1.4 1.9* 1.5*  1.5*   CALCIUM 9.7 9.1 9.0  9.0       CBC:   No results for input(s): WBC, HGB, HCT, PLT in the last 168 hours.    Urine Studies: No results for input(s): COLORU, APPEARANCEUA, PHUR, SPECGRAV, PROTEINUA, GLUCUA, KETONESU, BILIRUBINUA, OCCULTUA, NITRITE, UROBILINOGEN, LEUKOCYTESUR, RBCUA, WBCUA, BACTERIA, SQUAMEPITHEL, HYALINECASTS in the last 168 hours.    Invalid input(s): WRIGHTSUR  All pertinent labs results from the past 24 hours have been reviewed.  Recent Lab Results       None            Significant Imaging:  All pertinent imaging results/findings from the past 24 hours have been reviewed.                    Assessment/Plan:     Bilateral ureteral obstruction  Edita Riley is a 59 yo F with PMHx distal ureteral strictures 2/2 XRT for cervical cancer, s/p transverse colon conduit and B/L nephrostomy tubes complicated by MDR infections who presents to the ED complaining of R sided flank pain since last night.  She is s/p R PCNL (new access with IR), R antegrade URS, stone extraction for mid ureteral stone on 4/21.     - ID following, ucx growing enterococcus, blood cx neg  - currently on ertapenem/linezolid  - S/p R PCNL 4/21.  - PRN antiemetics, MM pain control -  - Clamping trial of right nephrostomy tube 4/24. If Cr stable today, will consider removal  - Left nephrostomy tube to remain unclamped and maintained upon discharge  - Cr improved 1.5 from 1.8  - will arrange outpatient follow-up with urology   - rest of care per primary       Dispo: Recheck  Cr 4/25 after clamping trial of R PCNT          VTE Risk Mitigation (From admission, onward)         Ordered     enoxaparin injection 40 mg  Daily         04/21/23 1115     IP VTE HIGH RISK PATIENT  Once         04/16/23 1930     Place sequential compression device  Until discontinued         04/16/23 1930                Maryann Darnell MD  Urology  [facility] - Surgery

## 2023-04-25 NOTE — NURSING
Nurse changed Ileostomy bag. Patient was cleaned and linen was changed. 75ml was emptied from Left nephrostomy tube.

## 2023-04-25 NOTE — ASSESSMENT & PLAN NOTE
Edita Hardin [facility] is a 57 yo F with PMHx distal ureteral strictures 2/2 XRT for cervical cancer, s/p transverse colon conduit and B/L nephrostomy tubes complicated by MDR infections who presents to the ED complaining of R sided flank pain since last night.  She is s/p R PCNL (new access with IR), R antegrade URS, stone extraction for mid ureteral stone on 4/21.     - ID following, ucx growing enterococcus, blood cx neg  - currently on ertapenem/linezolid  - S/p R PCNL 4/21.  - PRN antiemetics, MM pain control -  - Clamping trial of right nephrostomy tube 4/24. If Cr stable today, will consider removal  - Left nephrostomy tube to remain unclamped and maintained upon discharge  - Cr improved 1.5 from 1.8  - will arrange outpatient follow-up with urology   - rest of care per primary       Dispo: Recheck Cr 4/25 after clamping trial of R PCNT

## 2023-04-25 NOTE — PROGRESS NOTES
Urology Progress Note    Patient seen and examined.  AM Cr 1.2 from 1.5 following clamping trial of R neph tube.  R neph tube removed at bedside and dressing applied.    - ok for discharge from urology perspective     Please call with further questions or concerns.    Maryann Darnell MD, PGY-1   [facility]

## 2023-04-25 NOTE — PROGRESS NOTES
Hospital Medicine  Progress note    Team: [facility] [place] [place] HOSP MED R Lucero Bah MD  Admit Date: 4/16/2023    Principal Problem:  Urinary tract infection associated with nephrostomy catheter    Interval hx: Pain controlled. No new complaints    ROS   Respiratory: neg for cough neg for shortness of breath  Cardiovascular: neg for chest pain neg for palpitations  Gastrointestinal: neg for nausea neg for vomiting, neg for abdominal pain neg for diarrhea neg for constipation   Behavioral/Psych: neg for depression neg for anxiety    PEx  Temp:  [97.3 °F (36.3 °C)-98.3 °F (36.8 °C)]   Pulse:  [70-85]   Resp:  [16-19]   BP: ()/(50-59)   SpO2:  [93 %-99 %]     Intake/Output Summary (Last 24 hours) at 4/25/2023 1514  Last data filed at 4/25/2023 0650  Gross per 24 hour   Intake 240 ml   Output 400 ml   Net -160 ml     General Appearance: no acute distress, WDWN  Heart: regular rate and rhythm, no heave  Respiratory: Normal respiratory effort, symmetric excursion, bilateral vesicular breath sounds   Abdomen: Soft, non-tender; bowel sounds active  Skin: intact, no rash, no ulcers  Neurologic:  No focal numbness or weakness  Mental status: Alert, oriented x 4, affect appropriate     Recent Labs   Lab 04/23/23  0802 04/24/23  0338 04/25/23  0519    137  137 143   K 4.6 4.8  4.8 4.9    107  107 108   CO2 22* 25  25 27   BUN 27* 22*  22* 17   CREATININE 1.9* 1.5*  1.5* 1.2    97  97 89   CALCIUM 9.1 9.0  9.0 9.3   MG  --  1.8 1.9   PHOS  --  2.6* 4.5       Recent Labs   Lab 04/24/23  0338 04/25/23  0519   ALBUMIN 2.6* 2.6*       Scheduled Meds:   acetaminophen  1,000 mg Oral TID    enoxaparin  40 mg Subcutaneous Daily    ertapenem (INVANZ) IVPB  1 g Intravenous Q24H    gabapentin  200 mg Oral QHS    linezolid  600 mg Oral Q12H    oxybutynin  10 mg Oral Daily    tamsulosin  0.4 mg Oral Daily     Continuous Infusions:  As Needed:  acetaminophen, dextrose, dextrose, melatonin, naloxone, ondansetron, oxyCODONE,  prochlorperazine, sodium chloride 0.9%, sodium chloride 0.9%    Assessment and Plan  / Problems managed today    * Urinary tract infection associated with nephrostomy catheter  Klesiella variocola infection  Enterococcus faecium  -P With recurrent MDR infections related to nephrostomy tubes and chronic ureteral strictures  -Urine culture 4/10 growing klebsiella and enterococcus. IV vancomycin and ertapenem started in ED  - ID consulted  -Urine cx 4/18 obtained from R nephrostomy tube now growing e faecium VRE.  -Unclear if klebsiella growing from urine cultures obtained from PCN tube collection bag is a colonizer/ contaminant vs true pathogen, but have been covering with ertapenem since 4/16 and did not grow in subsequent culture.   Underwent Rt PCN drain removal, stone extraction and drain replacement  --ertapenem 1 g IV daily for total 10 days. ID recommended Bactrim if leaves before then. Last dose 4/26   - patient wishes to complete IV antibiotic course prior to discharge (1 more day)  --vanc switched to linezolid today to cover e faecium VRE; continue x 10 days. Last day 4/30/23.    CKD (chronic kidney disease), stage III  -Cr stable at 1.3, no acute issues      Ileostomy in place  -Pt. transverse colon conduit 2020 complicated by bowel perforation requiring hemicolectomy and ileostomy. No acute issues, ostomy bag in place  - recurring ostomy leakage affecting her quality fo life  - patient's  asked if this is reversible. Told him the urostomy is not reversible due to long-term damage from pelvic radiation for cervical cancer. Review CRS notes on events lead up to and operative note of ileostomy creation. She had much of colon removed and much adhesions. Likely not reversible. Will discuss with CRS if reversible, if so, will refer to outpatient CRS evaluation.    Bilateral ureteral obstruction  -Pt. S/p transverson colono conduit and B/L nephrostomy tubes, now with worsening R sided  hydronephrosis  -Urology consulted, R PCNL (new access with IR arranged at the same time), R antegrade URS, stone basket extraction 4/21  -post-operative pain   Multi-modal control   Acetaminophen 1000 mg TID, gabapentin 200 mg TID, oxybutynin ER 10 mg daily and tamsulosin 0.4 mg daily - stopped scheduled acetaminophen and start percocet 5/325 mg q4h prn pain  - urology following daily. Right nephrostomy tube removed. Left  Nephrostomy tube to remain in place    Discharge Planning   OK: 4/25/2023     Code Status: Full Code   Is the patient medically ready for discharge?:     Reason for patient still in hospital (select all that apply): Patient trending condition  Discharge Plan A: Home with family      Diet:  regular diet  GI PPx: not needed  DVT PPx:  enoxaparin  Drains: ileal conduit, bilateral nephrostomy, ileostomy  Airways: room air  Wounds: ostomy and percutaneous drain sites    Goals of Care:  Return to prior functional status     Time (minutes) spent in care of the patient including review of tests, flow sheets and notes since last visit, face to face contact, placing orders, communicating with consultants if needed, care coordination, and documentation: 35 min.    Lucero Bah MD

## 2023-04-25 NOTE — SUBJECTIVE & OBJECTIVE
Interval History: NAEO.  AFVSS.  Right neph tube clamped.  Left neph tube draining c/y/u.  AM Cr pending.      L NT: 200/150/-  Urostomy: 425/175/-    Review of Systems: per HPI   Objective:     Temp:  [97.3 °F (36.3 °C)-98.6 °F (37 °C)] 98.1 °F (36.7 °C)  Pulse:  [] 82  Resp:  [16-19] 18  SpO2:  [95 %-99 %] 95 %  BP: (101-124)/(50-62) 112/59     Body mass index is 29.09 kg/m².           Drains       Drain  Duration                  Urostomy 09/11/20 0000 ileal conduit  days         Ileostomy 09/18/20  days         Nephrostomy 03/28/23 0953 Left 12 Fr. 27 days         Nephrostomy 04/21/23 1419 Right 8 Fr. 3 days                    Physical Exam  Vitals reviewed.   Constitutional:       General: She is not in acute distress.     Appearance: She is not toxic-appearing or diaphoretic.   HENT:      Head: Normocephalic and atraumatic.      Nose: Nose normal.   Eyes:      Conjunctiva/sclera: Conjunctivae normal.   Cardiovascular:      Rate and Rhythm: Normal rate.   Pulmonary:      Effort: Pulmonary effort is normal. No respiratory distress.   Abdominal:      General: Abdomen is flat. There is no distension.      Palpations: Abdomen is soft.      Comments: Urostomy draining c/y/u.  Right nephrostomy tube clamped, left nephrostomy open to drainage. Right neph tube dressing urine and blood tinged.   Musculoskeletal:         General: No swelling or deformity. Normal range of motion.      Cervical back: Normal range of motion.   Skin:     General: Skin is warm and dry.      Findings: No erythema.   Neurological:      General: No focal deficit present.      Mental Status: She is alert.      Motor: No weakness.   Psychiatric:         Mood and Affect: Mood normal.         Behavior: Behavior normal.         Thought Content: Thought content normal.       Significant Labs:    BMP:  Recent Labs   Lab 04/22/23  0828 04/23/23  0802 04/24/23  0338    136 137  137   K 4.3 4.6 4.8  4.8    106 107  107    CO2 21* 22* 25  25   BUN 22* 27* 22*  22*   CREATININE 1.4 1.9* 1.5*  1.5*   CALCIUM 9.7 9.1 9.0  9.0       CBC:   No results for input(s): WBC, HGB, HCT, PLT in the last 168 hours.    Urine Studies: No results for input(s): COLORU, APPEARANCEUA, PHUR, SPECGRAV, PROTEINUA, GLUCUA, KETONESU, BILIRUBINUA, OCCULTUA, NITRITE, UROBILINOGEN, LEUKOCYTESUR, RBCUA, WBCUA, BACTERIA, SQUAMEPITHEL, HYALINECASTS in the last 168 hours.    Invalid input(s): WRIGHTSUR  All pertinent labs results from the past 24 hours have been reviewed.  Recent Lab Results       None            Significant Imaging:  All pertinent imaging results/findings from the past 24 hours have been reviewed.

## 2023-04-26 ENCOUNTER — TELEPHONE (OUTPATIENT)
Dept: UROLOGY | Facility: CLINIC | Age: 58
End: 2023-04-26
Payer: MEDICAID

## 2023-04-26 VITALS
WEIGHT: 197 LBS | SYSTOLIC BLOOD PRESSURE: 103 MMHG | HEIGHT: 69 IN | BODY MASS INDEX: 29.18 KG/M2 | TEMPERATURE: 98 F | DIASTOLIC BLOOD PRESSURE: 54 MMHG | HEART RATE: 85 BPM | OXYGEN SATURATION: 100 % | RESPIRATION RATE: 16 BRPM

## 2023-04-26 LAB
ALBUMIN SERPL BCP-MCNC: 2.7 G/DL (ref 3.5–5.2)
ANION GAP SERPL CALC-SCNC: 10 MMOL/L (ref 8–16)
BUN SERPL-MCNC: 16 MG/DL (ref 6–20)
CALCIUM SERPL-MCNC: 9.2 MG/DL (ref 8.7–10.5)
CHLORIDE SERPL-SCNC: 105 MMOL/L (ref 95–110)
CO2 SERPL-SCNC: 26 MMOL/L (ref 23–29)
CREAT SERPL-MCNC: 1.2 MG/DL (ref 0.5–1.4)
EST. GFR  (NO RACE VARIABLE): 51.8 ML/MIN/1.73 M^2
GLUCOSE SERPL-MCNC: 87 MG/DL (ref 70–110)
MAGNESIUM SERPL-MCNC: 1.8 MG/DL (ref 1.6–2.6)
PHOSPHATE SERPL-MCNC: 2.8 MG/DL (ref 2.7–4.5)
POTASSIUM SERPL-SCNC: 4.2 MMOL/L (ref 3.5–5.1)
SODIUM SERPL-SCNC: 141 MMOL/L (ref 136–145)

## 2023-04-26 PROCEDURE — 36415 COLL VENOUS BLD VENIPUNCTURE: CPT | Performed by: INTERNAL MEDICINE

## 2023-04-26 PROCEDURE — 63600175 PHARM REV CODE 636 W HCPCS: Performed by: INTERNAL MEDICINE

## 2023-04-26 PROCEDURE — 25000003 PHARM REV CODE 250: Performed by: INTERNAL MEDICINE

## 2023-04-26 PROCEDURE — 99239 PR HOSPITAL DISCHARGE DAY,>30 MIN: ICD-10-PCS | Mod: ,,, | Performed by: INTERNAL MEDICINE

## 2023-04-26 PROCEDURE — 80069 RENAL FUNCTION PANEL: CPT | Performed by: INTERNAL MEDICINE

## 2023-04-26 PROCEDURE — 83735 ASSAY OF MAGNESIUM: CPT | Performed by: INTERNAL MEDICINE

## 2023-04-26 PROCEDURE — 99239 HOSP IP/OBS DSCHRG MGMT >30: CPT | Mod: ,,, | Performed by: INTERNAL MEDICINE

## 2023-04-26 RX ADMIN — OXYBUTYNIN CHLORIDE 10 MG: 10 TABLET, EXTENDED RELEASE ORAL at 09:04

## 2023-04-26 RX ADMIN — LINEZOLID 600 MG: 600 TABLET, FILM COATED ORAL at 09:04

## 2023-04-26 RX ADMIN — TAMSULOSIN HYDROCHLORIDE 0.4 MG: 0.4 CAPSULE ORAL at 09:04

## 2023-04-26 RX ADMIN — ERTAPENEM 1 G: 1 INJECTION INTRAMUSCULAR; INTRAVENOUS at 09:04

## 2023-04-26 NOTE — PLAN OF CARE
[facility]  Discharge Reassessment    Primary Care Provider: Primary Doctor No    Expected Discharge Date: 4/26/2023    Reassessment (most recent)       Discharge Reassessment - 04/26/23 0852          Discharge Reassessment    Assessment Type Discharge Planning Reassessment     Did the patient's condition or plan change since previous assessment? No     Discharge Plan discussed with: Patient     Discharge Plan A Home with family     Discharge Plan B Home with family     DME Needed Upon Discharge  none     Discharge Barriers Identified None                   Patient to dc home today with no needs.

## 2023-04-26 NOTE — PROGRESS NOTES
Dsicharge instructions discussed with patient, IV removed. Patient brought down via wheelchair for discharge.

## 2023-04-26 NOTE — TELEPHONE ENCOUNTER
----- Message from Leslie Balbuena MD sent at 4/25/2023  5:04 PM CDT -----  Please put her on the schedule.  They are done with her treatment on the right side.  I need to address the left side.  Thanks.   ----- Message -----  From: Hellen Beltran LPN  Sent: 4/25/2023   2:02 PM CDT  To: Leslie Balbuena MD    Please advise. Pt just had procedure w/ Dr. Russ on 4/21/23.  ----- Message -----  From: Maryann Darnell MD  Sent: 4/25/2023   6:30 AM CDT  To: Sree Nelson [facility],     This patient will need follow-up with Dr. Balbuena in 2 weeks in clinic.     Thanks!  Maryann Darnell MD, PGY-1   [facility]

## 2023-04-27 ENCOUNTER — PATIENT OUTREACH (OUTPATIENT)
Dept: ADMINISTRATIVE | Facility: CLINIC | Age: 58
End: 2023-04-27
Payer: MEDICAID

## 2023-04-27 NOTE — PROGRESS NOTES
C3 nurse spoke with Edita Riley  for a TCC post hospital discharge follow up call. The patient has a scheduled HOSFU appointment with Rhonda Aguilar MD on 5/3/23 @ 1400.

## 2023-04-27 NOTE — PLAN OF CARE
[facility] - [facility]  Discharge Final Note    Primary Care Provider: Primary Doctor No    Expected Discharge Date: 4/26/2023    Final Discharge Note (most recent)       Final Note - 04/26/23 1318          Final Note    Assessment Type Final Discharge Note     Anticipated Discharge Disposition Home or Self Care     Hospital Resources/Appts/Education Provided Provided patient/caregiver with written discharge plan information;Appointments scheduled by Navigator/Coordinator                   Future Appointments   Date Time Provider Department Center   5/10/2023 11:20 AM Leslie Balbuena MD [facility] UROLOGY [facility]          Contact Info       No, Primary Doctor   Relationship: PCP - General        Next Steps: Follow up    Rhonda Aguilar MD   Specialty: Family Medicine    [address]  [facility] [address]   Phone: [phone]       Next Steps: Follow up on 5/3/2023    Instructions: @ 2:00pm.

## 2023-04-28 LAB
COMPN STONE: NORMAL
SPECIMEN SOURCE: NORMAL
STONE ANALYSIS IR-IMP: NORMAL

## 2023-05-10 ENCOUNTER — OFFICE VISIT (OUTPATIENT)
Dept: UROLOGY | Facility: CLINIC | Age: 58
End: 2023-05-10
Payer: MEDICAID

## 2023-05-10 VITALS
HEART RATE: 76 BPM | HEIGHT: 69 IN | SYSTOLIC BLOOD PRESSURE: 126 MMHG | WEIGHT: 185.19 LBS | BODY MASS INDEX: 27.43 KG/M2 | DIASTOLIC BLOOD PRESSURE: 77 MMHG

## 2023-05-10 DIAGNOSIS — Z93.6 PRESENCE OF UROSTOMY: ICD-10-CM

## 2023-05-10 DIAGNOSIS — N13.1 HYDRONEPHROSIS WITH URETERAL STRICTURE, NOT ELSEWHERE CLASSIFIED: Primary | ICD-10-CM

## 2023-05-10 DIAGNOSIS — Z93.6 NEPHROSTOMY STATUS: ICD-10-CM

## 2023-05-10 PROBLEM — N39.0 URINARY TRACT INFECTION ASSOCIATED WITH NEPHROSTOMY CATHETER: Status: RESOLVED | Noted: 2021-06-17 | Resolved: 2023-05-10

## 2023-05-10 PROBLEM — N13.5 BILATERAL URETERAL OBSTRUCTION: Status: RESOLVED | Noted: 2020-09-11 | Resolved: 2023-05-10

## 2023-05-10 PROBLEM — T83.512A URINARY TRACT INFECTION ASSOCIATED WITH NEPHROSTOMY CATHETER: Status: RESOLVED | Noted: 2021-06-17 | Resolved: 2023-05-10

## 2023-05-10 PROCEDURE — 1111F PR DISCHARGE MEDS RECONCILED W/ CURRENT OUTPATIENT MED LIST: ICD-10-PCS | Mod: CPTII,,, | Performed by: UROLOGY

## 2023-05-10 PROCEDURE — 3078F DIAST BP <80 MM HG: CPT | Mod: CPTII,,, | Performed by: UROLOGY

## 2023-05-10 PROCEDURE — 99999 PR PBB SHADOW E&M-EST. PATIENT-LVL III: ICD-10-PCS | Mod: PBBFAC,,, | Performed by: UROLOGY

## 2023-05-10 PROCEDURE — 1159F MED LIST DOCD IN RCRD: CPT | Mod: CPTII,,, | Performed by: UROLOGY

## 2023-05-10 PROCEDURE — 99999 PR PBB SHADOW E&M-EST. PATIENT-LVL III: CPT | Mod: PBBFAC,,, | Performed by: UROLOGY

## 2023-05-10 PROCEDURE — 3008F BODY MASS INDEX DOCD: CPT | Mod: CPTII,,, | Performed by: UROLOGY

## 2023-05-10 PROCEDURE — 3074F SYST BP LT 130 MM HG: CPT | Mod: CPTII,,, | Performed by: UROLOGY

## 2023-05-10 PROCEDURE — 99024 PR POST-OP FOLLOW-UP VISIT: ICD-10-PCS | Mod: S$PBB,,, | Performed by: UROLOGY

## 2023-05-10 PROCEDURE — 3074F PR MOST RECENT SYSTOLIC BLOOD PRESSURE < 130 MM HG: ICD-10-PCS | Mod: CPTII,,, | Performed by: UROLOGY

## 2023-05-10 PROCEDURE — 3078F PR MOST RECENT DIASTOLIC BLOOD PRESSURE < 80 MM HG: ICD-10-PCS | Mod: CPTII,,, | Performed by: UROLOGY

## 2023-05-10 PROCEDURE — 3008F PR BODY MASS INDEX (BMI) DOCUMENTED: ICD-10-PCS | Mod: CPTII,,, | Performed by: UROLOGY

## 2023-05-10 PROCEDURE — 99024 POSTOP FOLLOW-UP VISIT: CPT | Mod: S$PBB,,, | Performed by: UROLOGY

## 2023-05-10 PROCEDURE — 1111F DSCHRG MED/CURRENT MED MERGE: CPT | Mod: CPTII,,, | Performed by: UROLOGY

## 2023-05-10 PROCEDURE — 1159F PR MEDICATION LIST DOCUMENTED IN MEDICAL RECORD: ICD-10-PCS | Mod: CPTII,,, | Performed by: UROLOGY

## 2023-05-10 PROCEDURE — 99213 OFFICE O/P EST LOW 20 MIN: CPT | Mod: PBBFAC | Performed by: UROLOGY

## 2023-05-10 NOTE — PROGRESS NOTES
CHIEF COMPLAINT:    Mrs. Riley is a 58 y.o. female presenting for a follow up on left hydronephrosis status post transverse colon conduit.     PRESENTING ILLNESS:    Edita Riley is a 58 y.o. female who is presents after having her stones treated on the right kidney.  A PCNL was performed along with antegrade ureteroscopy.  The percutaneous nephrostomy tube was removed from the right side and the patient has done well from that standpoint.  While the patient was in the hospital, a clamping trial was done on the left, however, the patient had worsening of her kidney function and the tube had to be opened. She has no issues with the drainage.  Urine in the nephrostomy bag is clear. As is the urine from the transverse colon conduit.    She originally had stents placed for hydronephrosis after having radiation therapy performed for cervical cancer.  Ultimately,  she failed the stents and a transverse colon conduit was performed.  This was complicated by colon perforation distal to the anastamosis.  She has been stable for a number of years but had to have bilateral nephrostomy tubes the right side for stones, the left due to concern for ureteroenteric anastamotic stricture.  She had lost a signficant amount of weight at one point but has gained it back and looks much more robust than she had in the past.     REVIEW OF SYSTEMS:    Review of Systems   Constitutional: Negative.    HENT: Negative.     Eyes: Negative.    Respiratory: Negative.     Cardiovascular: Negative.    Gastrointestinal: Negative.    Genitourinary: Negative.    Musculoskeletal: Negative.    Skin: Negative.    Neurological: Negative.    Endo/Heme/Allergies: Negative.    Psychiatric/Behavioral: Negative.       PATIENT HISTORY:    Past Medical History:   Diagnosis Date    Abnormal mammogram 08/25/2020    Acute blood loss anemia     Acute deep vein thrombosis (DVT) of lower extremity 12/09/2020    Advance care planning 04/30/2021     Anemia due to chronic blood loss     Anemia due to chronic kidney disease     Anxiety     Bilateral ureteral obstruction 9/11/2020    Cardiovascular event risk -- low 09/14/2015    ASCVD 10-year risk 1.9% (with optimal risk factors 1.3%) as of 9/14/2015     Cervical cancer 2014    Chronic back pain     Colostomy care     Deep vein thrombosis     Depression     Diarrhea due to malabsorption 11/14/2018    Diarrhea due to malabsorption 11/14/2018    Difficult intubation     Disorder of kidney and ureter     DVT of lower extremity, bilateral 11/04/2020    Emphysema of lung 4/10/2023    Fibromyalgia     Fungemia 09/27/2020    Generalized abdominal pain 08/25/2020    GERD (gastroesophageal reflux disease)     Hemifacial spasm 09/16/2015    Hiatal hernia 2014    History of cervical cancer 10/11/2018    Hx of psychiatric care     Cymbalta, trazodone    Hypertension     Hypomagnesemia 11/21/2018    Lactose intolerance     Metastatic squamous cell carcinoma to lymph node 10/11/2018    Nephrostomy tube displaced     Neuropathy due to chemotherapeutic drug 11/14/2018    Osteoarthritis of back     Peritonitis 09/22/2020    Pseudomonas urinary tract infection 04/21/2021    Psychiatric problem     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 Bx were negative.     Seizures     Sleep stage dysfunction     Noted on PSG 06/2017; negative for obstructive sleep apnea     Stroke     Urinary tract infection associated with nephrostomy catheter 06/11/2020    Wound infection after surgery 09/24/2020       Past Surgical History:   Procedure Laterality Date    ANTEGRADE NEPHROSTOGRAPHY Bilateral 9/28/2020    Procedure: Nephrostogram - antegrade;  Surgeon: Leslie Balbuena MD;  Location: [facility] OR [address] [place] [address];  Service: Urology;  Laterality: Bilateral;    ANTEGRADE NEPHROSTOGRAPHY Left 4/20/2021    Procedure: NEPHROSTOGRAM, ANTEGRADE;   Surgeon: Leslie Balbuena MD;  Location: [facility] OR 1ST FLR;  Service: Urology;  Laterality: Left;    ANTEGRADE NEPHROSTOGRAPHY Right 10/27/2022    Procedure: NEPHROSTOGRAM, ANTEGRADE;  Surgeon: Chirag Russ MD;  Location: [facility] OR 1ST FLR;  Service: Urology;  Laterality: Right;    ANTEGRADE NEPHROSTOGRAPHY Right 4/21/2023    Procedure: NEPHROSTOGRAM, ANTEGRADE;  Surgeon: Chirag Russ MD;  Location: [facility] OR 2ND FLR;  Service: Urology;  Laterality: Right;    BILATERAL OOPHORECTOMY  2015    CHOLECYSTECTOMY  11/09/2016    Done at [facility], path showed chronic cholecystitis and gallstones    cold knife conization  2014    COLECTOMY, RIGHT  9/17/2020    Procedure: COLECTOMY, RIGHT Extended;  Surgeon: Hammad Reynolds MD;  Location: [facility] OR 2ND FLR;  Service: Colon and Rectal;;    COLONOSCOPY  2014    COLONOSCOPY N/A 10/24/2016    at [facility], Dr Gutiérrez    COLONOSCOPY N/A 5/18/2018    Procedure: COLONOSCOPY;  Surgeon: Arden Gutiérrez MD;  Location: [facility] (4TH FLR);  Service: Endoscopy;  Laterality: N/A;    COLONOSCOPY N/A 7/28/2020    Procedure: COLONOSCOPY;  Surgeon: Hammad Reynolds MD;  Location: [facility] (4TH FLR);  Service: Colon and Rectal;  Laterality: N/A;  covid test [place] 7/25    CYSTOSCOPY WITH URETEROSCOPY, RETROGRADE PYELOGRAPHY, AND INSERTION OF STENT Bilateral 3/21/2020    Procedure: CYSTOSCOPY, WITH RETROGRADE PYELOGRAM,;  Surgeon: Leslie Balbuena MD;  [facility];  Service: Urology;  Laterality: Bilateral;    DILATION OF NEPHROSTOMY TRACT Right 10/27/2022    Procedure: DILATION, NEPHROSTOMY TRACT;  Surgeon: Chirag Russ MD;  [facility];  Service: Urology;  Laterality: Right;    ESOPHAGOGASTRODUODENOSCOPY  2014    ESOPHAGOGASTRODUODENOSCOPY  11/18/2020    ESOPHAGOGASTRODUODENOSCOPY N/A 11/18/2020    Procedure: ESOPHAGOGASTRODUODENOSCOPY (EGD);  Surgeon: Zenon Spencer MD;  Location: [facility];  Service: Endoscopy;  Laterality: N/A;     ESOPHAGOGASTRODUODENOSCOPY N/A 12/11/2020    Procedure: EGD (ESOPHAGOGASTRODUODENOSCOPY);  Surgeon: Juancho Muse MD;  Location: [facility] ENDO (2ND FLR);  Service: Endoscopy;  Laterality: N/A;    HYSTERECTOMY  2014    [facility] for cervical cancer    ILEOSTOMY  9/17/2020    Procedure: CREATION, ILEOSTOMY;  Surgeon: Hammad Reynolds MD;  Location: [facility] OR 2ND FLR;  Service: Colon and Rectal;;    LOOPOGRAM N/A 4/20/2021    Procedure: LOOPOGRAM;  Surgeon: Leslie Balbuena MD;  Location: [facility] OR 1ST FLR;  Service: Urology;  Laterality: N/A;    MOBILIZATION OF SPLENIC FLEXURE N/A 9/11/2020    Procedure: MOBILIZATION, COLONIC;  Surgeon: Hammad Reynolds MD;  Location: [facility] OR 2ND FLR;  Service: Colon and Rectal;  Laterality: N/A;    NEPHROSTOGRAPHY Bilateral 4/17/2021    Procedure: Nephrostogram;  Surgeon: Celeste Surgeon;  [facility];  Service: Anesthesiology;  Laterality: Bilateral;    OOPHORECTOMY      PERCUTANEOUS NEPHROLITHOTOMY Right 4/21/2023    Procedure: NEPHROLITHOTOMY, PERCUTANEOUS;  Surgeon: Chirag Russ MD;  Location: [facility] OR 2ND FLR;  Service: Urology;  Laterality: Right;  2.5 hrs    PERCUTANEOUS NEPHROSTOMY  4/21/2023    Procedure: CREATION, NEPHROSTOMY, PERCUTANEOUS with removal of existing nephrostomy tube;  Surgeon: Chirag Russ MD;  Location: [facility] OR 2ND FLR;  Service: Urology;;    PYELOSCOPY Right 10/27/2022    Procedure: PYELOSCOPY;  Surgeon: Chirag Russ MD;  Location: [facility] OR 1ST FLR;  Service: Urology;  Laterality: Right;    REPLACEMENT OF NEPHROSTOMY TUBE Right 10/27/2022    Procedure: REPLACEMENT, NEPHROSTOMY TUBE;  Surgeon: Chirag Russ MD;  Location: [facility] OR 1ST FLR;  Service: Urology;  Laterality: Right;    RETROPERITONEAL LYMPHADENECTOMY Right 9/17/2018    Procedure: LYMPHADENECTOMY, RETROPERITONEUM;  Surgeon: Sebas Reed MD;  Location: [facility] OR 2ND FLR;  Service: General;  Laterality: Right;  ILIAC    URETEROSCOPIC REMOVAL OF URETERIC CALCULUS Right 10/27/2022     Procedure: REMOVAL, CALCULUS, URETER, URETEROSCOPIC;  Surgeon: Chirag Russ MD;  Location: [facility] OR [address] [place] [address];  Service: Urology;  Laterality: Right;    URETEROSCOPY Right 10/27/2022    Procedure: URETEROSCOPY-ANTEGRADE;  Surgeon: Chirag Russ MD;  [facility];  Service: Urology;  Laterality: Right;       Family History   Problem Relation Age of Onset    Heart disease Sister     Heart disease Maternal Grandmother     Colon cancer Father     Esophageal cancer Father     Cancer Father         Lung-smoker    Cancer Mother         Cervical    Cervical cancer Mother     Breast cancer Maternal Aunt     Suicide Daughter         jumped from parking structure    Drug abuse Daughter     Drug abuse Daughter     Rectal cancer Neg Hx     Stomach cancer Neg Hx     Crohn's disease Neg Hx     Ulcerative colitis Neg Hx     Diabetes Neg Hx     Hypertension Neg Hx        Social History     Socioeconomic History    Marital status:      Spouse name: Hammad    Number of children: 2   Tobacco Use    Smoking status: Never    Smokeless tobacco: Never   Substance and Sexual Activity    Alcohol use: No     Alcohol/week: 0.0 standard drinks    Drug use: No    Sexual activity: Yes     Partners: Male     Birth control/protection: None     Comment:  19 years since    Social History Narrative    , twin daughters (1  2018), disabled due to childhood stroke, Religion sophia     Social Determinants of Health     Financial Resource Strain: Low Risk     Difficulty of Paying Living Expenses: Not very hard   Food Insecurity: No Food Insecurity    Worried About Running Out of Food in the Last Year: Never true    Ran Out of Food in the Last Year: Never true   Transportation Needs: No Transportation Needs    Lack of Transportation (Medical): No    Lack of Transportation (Non-Medical): No   Physical Activity: Inactive    Days of Exercise per Week: 0 days    Minutes of Exercise per Session:  0 min   Stress: No Stress Concern Present    Feeling of Stress : Only a little   Social Connections: Moderately Isolated    Frequency of Communication with Friends and Family: More than three times a week    Frequency of Social Gatherings with Friends and Family: More than three times a week    Attends Zoroastrian Services: Never    Active Member of Clubs or Organizations: No    Attends Club or Organization Meetings: Never    Marital Status:    Housing Stability: Low Risk     Unable to Pay for Housing in the Last Year: No    Number of Places Lived in the Last Year: 1    Unstable Housing in the Last Year: No       Allergies:  Bee sting [allergen ext-venom-honey bee] and Grass pollen-bermuda, standard    Medications:  Outpatient Encounter Medications as of 5/10/2023   Medication Sig Dispense Refill    acetaminophen (TYLENOL) 500 MG tablet Take 1 tablet (500 mg total) by mouth every 4 (four) hours as needed for Pain. 30 tablet 0    albuterol (VENTOLIN HFA) 90 mcg/actuation inhaler Inhale 2 puffs into the lungs every 6 (six) hours as needed for Wheezing or Shortness of Breath. Rescue 18 g 0    gabapentin (NEURONTIN) 100 MG capsule Take 2 capsules (200 mg total) by mouth every evening. 90 capsule 3    [] linezolid (ZYVOX) 600 mg Tab Take 1 tablet (600 mg total) by mouth every 12 (twelve) hours. for 5 days 10 tablet 0    melatonin (MELATIN) 3 mg tablet Take 2 tablets (6 mg total) by mouth nightly as needed for Insomnia. 60 tablet 0    mirtazapine (REMERON SOL-TAB) 15 MG disintegrating tablet Dissolve 1 tablet (15 mg total) by mouth nightly. 30 tablet 11    oxybutynin (DITROPAN-XL) 10 MG 24 hr tablet Take 1 tablet (10 mg total) by mouth once daily. For spasms associated with neprhostomy tubes 30 tablet 0    tamsulosin (FLOMAX) 0.4 mg Cap Take 1 capsule (0.4 mg total) by mouth once daily. For spasm associated with nephrostomy tubes 30 capsule 0     No facility-administered encounter medications on file as of  5/10/2023.         PHYSICAL EXAMINATION:    The patient generally appears in good health, is appropriately interactive, and is in no apparent distress.    Skin: No lesions.    Mental: Cooperative with normal affect.    Neuro: Grossly intact.    HEENT: Normal. No evidence of lymphadenopathy.    Chest:  normal inspiratory effort.    Abdomen: Soft, non-tender. No masses or organomegaly. Bladder is not palpable. No evidence of flank discomfort. No evidence of inguinal hernia.    Extremities: No clubbing, cyanosis, or edema    Back:  the right flank is well healed.  Several sutures remain in place (appear to be undyed monocryl)    LABS:    Lab Results   Component Value Date    BUN 16 04/26/2023    CREATININE 1.2 04/26/2023       IMPRESSION:    Ureteroenteric stricture    PLAN:    1. For loopogram and antegrade nephrostogram to characterize the length of the stricture in planning for revision of the anastamosis.  2.  Consent obtained. Will perform under sedation in [facility].     I spent 30 minutes with the patient of which more than half was spent in direct consultation with the patient in regards to our treatment and plan.

## 2023-05-11 ENCOUNTER — TELEPHONE (OUTPATIENT)
Dept: UROLOGY | Facility: CLINIC | Age: 58
End: 2023-05-11
Payer: MEDICAID

## 2023-05-11 ENCOUNTER — HOSPITAL ENCOUNTER (EMERGENCY)
Facility: HOSPITAL | Age: 58
Discharge: HOME | End: 2023-05-12
Attending: EMERGENCY MEDICINE
Payer: MEDICAID

## 2023-05-11 DIAGNOSIS — F33.2 SEVERE EPISODE OF RECURRENT MAJOR DEPRESSIVE DISORDER, WITHOUT PSYCHOTIC FEATURES: ICD-10-CM

## 2023-05-11 DIAGNOSIS — N30.00 ACUTE CYSTITIS WITHOUT HEMATURIA: ICD-10-CM

## 2023-05-11 DIAGNOSIS — Z00.8 MEDICAL CLEARANCE FOR PSYCHIATRIC ADMISSION: ICD-10-CM

## 2023-05-11 DIAGNOSIS — R45.851 SUICIDAL IDEATION: Primary | ICD-10-CM

## 2023-05-11 DIAGNOSIS — N13.1 HYDRONEPHROSIS WITH URETERAL STRICTURE: Primary | ICD-10-CM

## 2023-05-11 LAB
ALBUMIN SERPL BCP-MCNC: 3.7 G/DL (ref 3.5–5.2)
ALP SERPL-CCNC: 155 U/L (ref 55–135)
ALT SERPL W/O P-5'-P-CCNC: 28 U/L (ref 10–44)
AMPHET+METHAMPHET UR QL: NEGATIVE
ANION GAP SERPL CALC-SCNC: 10 MMOL/L (ref 8–16)
ANION GAP SERPL CALC-SCNC: 13 MMOL/L (ref 8–16)
APAP SERPL-MCNC: <3 UG/ML (ref 10–20)
AST SERPL-CCNC: 31 U/L (ref 10–40)
BACTERIA #/AREA URNS AUTO: ABNORMAL /HPF
BARBITURATES UR QL SCN>200 NG/ML: NEGATIVE
BASOPHILS # BLD AUTO: 0.03 K/UL (ref 0–0.2)
BASOPHILS NFR BLD: 0.3 % (ref 0–1.9)
BENZODIAZ UR QL SCN>200 NG/ML: NEGATIVE
BILIRUB SERPL-MCNC: 0.3 MG/DL (ref 0.1–1)
BILIRUB UR QL STRIP: NEGATIVE
BUN SERPL-MCNC: 22 MG/DL (ref 6–20)
BUN SERPL-MCNC: 22 MG/DL (ref 6–20)
BUN SERPL-MCNC: 26 MG/DL (ref 6–30)
BZE UR QL SCN: NEGATIVE
CALCIUM SERPL-MCNC: 9.4 MG/DL (ref 8.7–10.5)
CALCIUM SERPL-MCNC: 9.6 MG/DL (ref 8.7–10.5)
CANNABINOIDS UR QL SCN: NEGATIVE
CHLORIDE SERPL-SCNC: 113 MMOL/L (ref 95–110)
CHLORIDE SERPL-SCNC: 114 MMOL/L (ref 95–110)
CHLORIDE SERPL-SCNC: 115 MMOL/L (ref 95–110)
CLARITY UR REFRACT.AUTO: ABNORMAL
CO2 SERPL-SCNC: 19 MMOL/L (ref 23–29)
CO2 SERPL-SCNC: 9 MMOL/L (ref 23–29)
COLOR UR AUTO: YELLOW
CREAT SERPL-MCNC: 1.3 MG/DL (ref 0.5–1.4)
CREAT SERPL-MCNC: 1.6 MG/DL (ref 0.5–1.4)
CREAT SERPL-MCNC: 1.6 MG/DL (ref 0.5–1.4)
CREAT UR-MCNC: 162 MG/DL (ref 15–325)
DIFFERENTIAL METHOD: ABNORMAL
EOSINOPHIL # BLD AUTO: 0.1 K/UL (ref 0–0.5)
EOSINOPHIL NFR BLD: 1.1 % (ref 0–8)
ERYTHROCYTE [DISTWIDTH] IN BLOOD BY AUTOMATED COUNT: 15.9 % (ref 11.5–14.5)
EST. GFR  (NO RACE VARIABLE): 36.7 ML/MIN/1.73 M^2
EST. GFR  (NO RACE VARIABLE): 47.1 ML/MIN/1.73 M^2
ETHANOL SERPL-MCNC: 10 MG/DL
GLUCOSE SERPL-MCNC: 84 MG/DL (ref 70–110)
GLUCOSE SERPL-MCNC: 87 MG/DL (ref 70–110)
GLUCOSE SERPL-MCNC: 93 MG/DL (ref 70–110)
GLUCOSE UR QL STRIP: NEGATIVE
HCT VFR BLD AUTO: 38.6 % (ref 37–48.5)
HCT VFR BLD CALC: 37 %PCV (ref 36–54)
HGB BLD-MCNC: 11.3 G/DL (ref 12–16)
HGB UR QL STRIP: ABNORMAL
HYALINE CASTS UR QL AUTO: 0 /LPF
IMM GRANULOCYTES # BLD AUTO: 0.03 K/UL (ref 0–0.04)
IMM GRANULOCYTES NFR BLD AUTO: 0.3 % (ref 0–0.5)
KETONES UR QL STRIP: NEGATIVE
LEUKOCYTE ESTERASE UR QL STRIP: ABNORMAL
LYMPHOCYTES # BLD AUTO: 1.3 K/UL (ref 1–4.8)
LYMPHOCYTES NFR BLD: 12.6 % (ref 18–48)
MCH RBC QN AUTO: 26.6 PG (ref 27–31)
MCHC RBC AUTO-ENTMCNC: 29.3 G/DL (ref 32–36)
MCV RBC AUTO: 91 FL (ref 82–98)
METHADONE UR QL SCN>300 NG/ML: NEGATIVE
MICROSCOPIC COMMENT: ABNORMAL
MONOCYTES # BLD AUTO: 1 K/UL (ref 0.3–1)
MONOCYTES NFR BLD: 9.7 % (ref 4–15)
NEUTROPHILS # BLD AUTO: 7.9 K/UL (ref 1.8–7.7)
NEUTROPHILS NFR BLD: 76 % (ref 38–73)
NITRITE UR QL STRIP: NEGATIVE
NRBC BLD-RTO: 0 /100 WBC
OPIATES UR QL SCN: NEGATIVE
PCP UR QL SCN>25 NG/ML: NEGATIVE
PH UR STRIP: 7 [PH] (ref 5–8)
PLATELET # BLD AUTO: 246 K/UL (ref 150–450)
PMV BLD AUTO: 11.4 FL (ref 9.2–12.9)
POC IONIZED CALCIUM: 1.32 MMOL/L (ref 1.06–1.42)
POC TCO2 (MEASURED): 22 MMOL/L (ref 23–29)
POTASSIUM BLD-SCNC: 3.7 MMOL/L (ref 3.5–5.1)
POTASSIUM SERPL-SCNC: 3.8 MMOL/L (ref 3.5–5.1)
POTASSIUM SERPL-SCNC: 4.2 MMOL/L (ref 3.5–5.1)
PROT SERPL-MCNC: 7.8 G/DL (ref 6–8.4)
PROT UR QL STRIP: ABNORMAL
RBC # BLD AUTO: 4.25 M/UL (ref 4–5.4)
RBC #/AREA URNS AUTO: 1 /HPF (ref 0–4)
SAMPLE: ABNORMAL
SODIUM BLD-SCNC: 143 MMOL/L (ref 136–145)
SODIUM SERPL-SCNC: 137 MMOL/L (ref 136–145)
SODIUM SERPL-SCNC: 142 MMOL/L (ref 136–145)
SP GR UR STRIP: 1.01 (ref 1–1.03)
SQUAMOUS #/AREA URNS AUTO: 7 /HPF
TOXICOLOGY INFORMATION: NORMAL
URN SPEC COLLECT METH UR: ABNORMAL
WBC # BLD AUTO: 10.39 K/UL (ref 3.9–12.7)
WBC #/AREA URNS AUTO: 24 /HPF (ref 0–5)

## 2023-05-11 PROCEDURE — 87186 SC STD MICRODIL/AGAR DIL: CPT | Performed by: EMERGENCY MEDICINE

## 2023-05-11 PROCEDURE — 82330 ASSAY OF CALCIUM: CPT

## 2023-05-11 PROCEDURE — 93010 EKG 12-LEAD: ICD-10-PCS | Mod: ,,, | Performed by: INTERNAL MEDICINE

## 2023-05-11 PROCEDURE — 63600175 PHARM REV CODE 636 W HCPCS: Performed by: EMERGENCY MEDICINE

## 2023-05-11 PROCEDURE — 99285 PR EMERGENCY DEPT VISIT,LEVEL V: ICD-10-PCS | Mod: ,,, | Performed by: EMERGENCY MEDICINE

## 2023-05-11 PROCEDURE — 87086 URINE CULTURE/COLONY COUNT: CPT | Performed by: EMERGENCY MEDICINE

## 2023-05-11 PROCEDURE — 85025 COMPLETE CBC W/AUTO DIFF WBC: CPT | Performed by: EMERGENCY MEDICINE

## 2023-05-11 PROCEDURE — 81001 URINALYSIS AUTO W/SCOPE: CPT | Mod: 59 | Performed by: EMERGENCY MEDICINE

## 2023-05-11 PROCEDURE — 87088 URINE BACTERIA CULTURE: CPT | Performed by: EMERGENCY MEDICINE

## 2023-05-11 PROCEDURE — 80053 COMPREHEN METABOLIC PANEL: CPT | Performed by: EMERGENCY MEDICINE

## 2023-05-11 PROCEDURE — 80143 DRUG ASSAY ACETAMINOPHEN: CPT | Performed by: EMERGENCY MEDICINE

## 2023-05-11 PROCEDURE — 96365 THER/PROPH/DIAG IV INF INIT: CPT

## 2023-05-11 PROCEDURE — 82077 ASSAY SPEC XCP UR&BREATH IA: CPT | Performed by: EMERGENCY MEDICINE

## 2023-05-11 PROCEDURE — 80048 BASIC METABOLIC PNL TOTAL CA: CPT | Mod: XB | Performed by: EMERGENCY MEDICINE

## 2023-05-11 PROCEDURE — 93005 ELECTROCARDIOGRAM TRACING: CPT

## 2023-05-11 PROCEDURE — 25000003 PHARM REV CODE 250: Performed by: EMERGENCY MEDICINE

## 2023-05-11 PROCEDURE — 99285 EMERGENCY DEPT VISIT HI MDM: CPT | Mod: 25

## 2023-05-11 PROCEDURE — 99285 EMERGENCY DEPT VISIT HI MDM: CPT | Mod: ,,, | Performed by: EMERGENCY MEDICINE

## 2023-05-11 PROCEDURE — 87077 CULTURE AEROBIC IDENTIFY: CPT | Mod: 59 | Performed by: EMERGENCY MEDICINE

## 2023-05-11 PROCEDURE — 80047 BASIC METABLC PNL IONIZED CA: CPT

## 2023-05-11 PROCEDURE — 93010 ELECTROCARDIOGRAM REPORT: CPT | Mod: ,,, | Performed by: INTERNAL MEDICINE

## 2023-05-11 PROCEDURE — 80307 DRUG TEST PRSMV CHEM ANLYZR: CPT | Performed by: EMERGENCY MEDICINE

## 2023-05-11 RX ADMIN — SODIUM CHLORIDE 1000 ML: 0.9 INJECTION, SOLUTION INTRAVENOUS at 10:05

## 2023-05-11 RX ADMIN — CEFTRIAXONE 1 G: 1 INJECTION, POWDER, FOR SOLUTION INTRAMUSCULAR; INTRAVENOUS at 10:05

## 2023-05-11 NOTE — LETTER
Fax Transmission                                                                                                                                                       Date: May 14, 2023       To:  Buffy RODRIGUES From: [facility]   Fax:  [phone] Fax:    Phone:   Phone: [phone]     Special Instructions:     Re: RosemaryAnthonyEdita DOB 61                              IF THERE ARE ANY PROBLEMS WITH THIS TRANSMISSION, PLEASE CALL IMMEDIATELY. THANK YOU    CONFIDENTIALITY NOTICE: The accompanying facsimile is intended solely for the use of the recipient designated above. Document(s) transmitted herewith may contain information that is confidential and privileged. Delivery, distribution of dissemination of this communication other than to the intended recipient is strictly prohibited. If you are another healthcare provider and have received this facsimile in error, please properly dispose and notify the sender. If you are NOT a healthcare provider and have received this facsimile in error, please notify [facility]s Compliance & Privacy Department immediately by email at [email or web address]

## 2023-05-12 VITALS
SYSTOLIC BLOOD PRESSURE: 107 MMHG | OXYGEN SATURATION: 99 % | TEMPERATURE: 98 F | DIASTOLIC BLOOD PRESSURE: 56 MMHG | RESPIRATION RATE: 18 BRPM | HEART RATE: 74 BPM

## 2023-05-12 DIAGNOSIS — Z12.31 SCREENING MAMMOGRAM FOR HIGH-RISK PATIENT: Primary | ICD-10-CM

## 2023-05-12 RX ORDER — CEPHALEXIN 500 MG/1
500 CAPSULE ORAL EVERY 12 HOURS
Qty: 14 CAPSULE | Refills: 0 | Status: SHIPPED | OUTPATIENT
Start: 2023-05-12 | End: 2023-05-19

## 2023-05-12 NOTE — ED NOTES
Edita Riley is in room # 27, was placed in paper scrubs and all cords and wires have been removed. The patient has signed their mental health rights and they have been placed in the chart. All the patient's belongings have been removed, labeled and locked in the belonging's closet. The PEC has been completed, signed, dated and placed in the patient's chart. There is a sitter, at the bedside with direct visual contact doing 15 minute observations and they have no belongings in the room. The transfer sheet should be signed upon the actual transfer. The Psych hold orders have been signed, dated and placed in the chart. Vital signs are being done per orders.

## 2023-05-12 NOTE — ED NOTES
I-STAT Chem-8+ Results:   Value Reference Range   Sodium 143 136-145 mmol/L   Potassium  3.7 3.5-5.1 mmol/L   Chloride 114  mmol/L   Ionized Calcium 1.32 1.06-1.42 mmol/L   CO2 (measured) 22 23-29 mmol/L   Glucose 93  mg/dL   BUN 26 6-30 mg/dL   Creatinine 1.6 0.5-1.4 mg/dL   Hematocrit 37 36-54%

## 2023-05-12 NOTE — PROVIDER PROGRESS NOTES - EMERGENCY DEPT.
Patient was signed out to me by Dr. Kumar pending laboratory studies for psych clearance. Briefly, this is a 59yo F presenting with SI, found to have UTI on rocephin.    Data:  Results for orders placed or performed during the hospital encounter of 05/11/23   Comprehensive metabolic panel   Result Value Ref Range    Sodium 137 136 - 145 mmol/L    Potassium 4.2 3.5 - 5.1 mmol/L    Chloride 115 (H) 95 - 110 mmol/L    CO2 9 (LL) 23 - 29 mmol/L    Glucose 87 70 - 110 mg/dL    BUN 22 (H) 6 - 20 mg/dL    Creatinine 1.6 (H) 0.5 - 1.4 mg/dL    Calcium 9.6 8.7 - 10.5 mg/dL    Total Protein 7.8 6.0 - 8.4 g/dL    Albumin 3.7 3.5 - 5.2 g/dL    Total Bilirubin 0.3 0.1 - 1.0 mg/dL    Alkaline Phosphatase 155 (H) 55 - 135 U/L    AST 31 10 - 40 U/L    ALT 28 10 - 44 U/L    Anion Gap 13 8 - 16 mmol/L    eGFR 36.7 (A) >60 mL/min/1.73 m^2   Urinalysis, Reflex to Urine Culture Urine, Clean Catch    Specimen: Urine   Result Value Ref Range    Specimen UA Urine, Clean Catch     Color, UA Yellow Yellow, Straw, [place]    Appearance, UA Hazy (A) Clear    pH, UA 7.0 5.0 - 8.0    Specific Gravity, UA 1.010 1.005 - 1.030    Protein, UA 1+ (A) Negative    Glucose, UA Negative Negative    Ketones, UA Negative Negative    Bilirubin (UA) Negative Negative    Occult Blood UA Trace (A) Negative    Nitrite, UA Negative Negative    Leukocytes, UA 3+ (A) Negative   Drug screen panel, emergency   Result Value Ref Range    Benzodiazepines Negative Negative    Methadone metabolites Negative Negative    Cocaine (Metab.) Negative Negative    Opiate Scrn, Ur Negative Negative    Barbiturate Screen, Ur Negative Negative    Amphetamine Screen, Ur Negative Negative    THC Negative Negative    Phencyclidine Negative Negative    Creatinine, Urine 162.0 15.0 - 325.0 mg/dL    Toxicology Information SEE COMMENT    Ethanol   Result Value Ref Range    Alcohol, Serum 10 (A) <10 mg/dL   Acetaminophen level   Result Value Ref Range    Acetaminophen (Tylenol), Serum <3.0  (L) 10.0 - 20.0 ug/mL   Urinalysis Microscopic   Result Value Ref Range    RBC, UA 1 0 - 4 /hpf    WBC, UA 24 (H) 0 - 5 /hpf    Bacteria Rare None-Occ /hpf    Squam Epithel, UA 7 /hpf    Hyaline Casts, UA 0 0-1/lpf /lpf    Microscopic Comment SEE COMMENT    CBC auto differential   Result Value Ref Range    WBC 10.39 3.90 - 12.70 K/uL    RBC 4.25 4.00 - 5.40 M/uL    Hemoglobin 11.3 (L) 12.0 - 16.0 g/dL    Hematocrit 38.6 37.0 - 48.5 %    MCV 91 82 - 98 fL    MCH 26.6 (L) 27.0 - 31.0 pg    MCHC 29.3 (L) 32.0 - 36.0 g/dL    RDW 15.9 (H) 11.5 - 14.5 %    Platelets 246 150 - 450 K/uL    MPV 11.4 9.2 - 12.9 fL    Immature Granulocytes 0.3 0.0 - 0.5 %    Gran # (ANC) 7.9 (H) 1.8 - 7.7 K/uL    Immature Grans (Abs) 0.03 0.00 - 0.04 K/uL    Lymph # 1.3 1.0 - 4.8 K/uL    Mono # 1.0 0.3 - 1.0 K/uL    Eos # 0.1 0.0 - 0.5 K/uL    Baso # 0.03 0.00 - 0.20 K/uL    nRBC 0 0 /100 WBC    Gran % 76.0 (H) 38.0 - 73.0 %    Lymph % 12.6 (L) 18.0 - 48.0 %    Mono % 9.7 4.0 - 15.0 %    Eosinophil % 1.1 0.0 - 8.0 %    Basophil % 0.3 0.0 - 1.9 %    Differential Method Automated    Basic metabolic panel   Result Value Ref Range    Sodium 142 136 - 145 mmol/L    Potassium 3.8 3.5 - 5.1 mmol/L    Chloride 113 (H) 95 - 110 mmol/L    CO2 19 (L) 23 - 29 mmol/L    Glucose 84 70 - 110 mg/dL    BUN 22 (H) 6 - 20 mg/dL    Creatinine 1.3 0.5 - 1.4 mg/dL    Calcium 9.4 8.7 - 10.5 mg/dL    Anion Gap 10 8 - 16 mmol/L    eGFR 47.1 (A) >60 mL/min/1.73 m^2   ISTAT PROCEDURE   Result Value Ref Range    POC Glucose 93 70 - 110 mg/dL    POC BUN 26 6 - 30 mg/dL    POC Creatinine 1.6 (H) 0.5 - 1.4 mg/dL    POC Sodium 143 136 - 145 mmol/L    POC Potassium 3.7 3.5 - 5.1 mmol/L    POC Chloride 114 (H) 95 - 110 mmol/L    POC TCO2 (MEASURED) 22 (L) 23 - 29 mmol/L    POC Ionized Calcium 1.32 1.06 - 1.42 mmol/L    POC Hematocrit 37 36 - 54 %PCV    Sample XIAO      *Note: Due to a large number of results and/or encounters for the requested time period, some results have not  been displayed. A complete set of results can be found in Results Review.        MDM:   Initial CMP showed non anion gap metabolic acidosis, though i-STAT was normal.  Repeat BNP revealed minimal acidosis with CO2 19.  Doubt toxic ingestion.  Suspect lab error.  Patient stable for further psychiatric care, will DC on Keflex for UTI.

## 2023-05-12 NOTE — ED NOTES
The patient is recv'd lying supine w/ eyes closed, rise/fall of chest noted. The bed is in the lowest locked position w/ both side rails in the upright position. DVC is maintained for safety. She is awaiting transfer.

## 2023-05-12 NOTE — ED PROVIDER NOTES
Encounter Date: 5/11/2023       History     Chief Complaint   Patient presents with    Psychiatric Evaluation     Pt brought in for psych eval for SI following the death of her daughter earlier today. Pt calm and cooperative at this time     HPI  Edita Riley he is a 58-year-old female with a history of anemia, anxiety, depression, history of DVT, fibromyalgia, emphysema, GERD, history of seizures, history of psychiatric admissions, osteoarthritis presenting with suicide ideation and suicidal plan following the death of her daughter.  She had a incident today where they found her daughter in her home where she committed suicide by gun fire.  Per EMS and family members, patient reported that she was going to use the same gun and kill herself because of suicidal thoughts, attempt and severe depression.  She appears tearful, depressed mood, with thoughts of suicide.  She denies any fevers, chills, nausea, vomiting or any other symptoms at this time.    Review of patient's allergies indicates:   Allergen Reactions    Bee sting [allergen ext-venom-honey bee]      Rash      Grass pollen-bermuda, standard      rash     Past Medical History:   Diagnosis Date    Abnormal mammogram 08/25/2020    Acute blood loss anemia     Acute deep vein thrombosis (DVT) of lower extremity 12/09/2020    Advance care planning 04/30/2021    Anemia due to chronic blood loss     Anemia due to chronic kidney disease     Anxiety     Bilateral ureteral obstruction 9/11/2020    Cardiovascular event risk -- low 09/14/2015    ASCVD 10-year risk 1.9% (with optimal risk factors 1.3%) as of 9/14/2015     Cervical cancer 2014    Chronic back pain     Colostomy care     Deep vein thrombosis     Depression     Diarrhea due to malabsorption 11/14/2018    Difficult intubation     Disorder of kidney and ureter     DVT of lower extremity, bilateral 11/04/2020    Emphysema of lung 4/10/2023    Fibromyalgia     Fungemia 09/27/2020    Generalized  abdominal pain 08/25/2020    GERD (gastroesophageal reflux disease)     Hemifacial spasm 09/16/2015    Hiatal hernia 2014    History of cervical cancer 10/11/2018    Hx of psychiatric care     Cymbalta, trazodone    Hypertension     Hypomagnesemia 11/21/2018    Lactose intolerance     Metastatic squamous cell carcinoma to lymph node 10/11/2018    Neuropathy due to chemotherapeutic drug 11/14/2018    Osteoarthritis of back     Peritonitis 09/22/2020    Pseudomonas urinary tract infection 04/21/2021    Psychiatric problem     Refusal of blood transfusions as patient is [employer]     Schatzki's ring 09/14/2015    Seen on outside EGD 05/2014, underwent esophageal dilatation. Bx were negative.     Seizures     Sleep stage dysfunction     Noted on PSG 06/2017; negative for obstructive sleep apnea     Stroke     Urinary tract infection associated with nephrostomy catheter 06/11/2020    Wound infection after surgery 09/24/2020     Past Surgical History:   Procedure Laterality Date    ANTEGRADE NEPHROSTOGRAPHY Bilateral 9/28/2020    Procedure: Nephrostogram - antegrade;  Surgeon: Leslie Balbuena MD;  Location: [facility] OR [address] [place] [address];  Service: Urology;  Laterality: Bilateral;    ANTEGRADE NEPHROSTOGRAPHY Left 4/20/2021    Procedure: NEPHROSTOGRAM, ANTEGRADE;  Surgeon: Leslie Balbuena MD;  Location: [facility] OR 1ST FLR;  Service: Urology;  Laterality: Left;    ANTEGRADE NEPHROSTOGRAPHY Right 10/27/2022    Procedure: NEPHROSTOGRAM, ANTEGRADE;  Surgeon: Chirag Russ MD;  Location: [facility] OR 1ST FLR;  Service: Urology;  Laterality: Right;    ANTEGRADE NEPHROSTOGRAPHY Right 4/21/2023    Procedure: NEPHROSTOGRAM, ANTEGRADE;  Surgeon: Chirag Russ MD;  Location: [facility] OR 2ND FLR;  Service: Urology;  Laterality: Right;    BILATERAL OOPHORECTOMY  2015    CHOLECYSTECTOMY  11/09/2016    Done at [facility], path showed chronic cholecystitis and gallstones    cold knife conization  2014    COLECTOMY, RIGHT  9/17/2020    Procedure:  COLECTOMY, RIGHT Extended;  Surgeon: Hammad Reynolds MD;  Location: [facility] OR 2ND FLR;  Service: Colon and Rectal;;    COLONOSCOPY  2014    COLONOSCOPY N/A 10/24/2016    at [facility], Dr Gutiérrez    COLONOSCOPY N/A 5/18/2018    Procedure: COLONOSCOPY;  Surgeon: Arden Gutiérrez MD;  Location: [facility] (4TH FLR);  Service: Endoscopy;  Laterality: N/A;    COLONOSCOPY N/A 7/28/2020    Procedure: COLONOSCOPY;  Surgeon: Hammad Reynolds MD;  Location: [facility] (4TH FLR);  Service: Colon and Rectal;  Laterality: N/A;  covid test [place] 7/25    CYSTOSCOPY WITH URETEROSCOPY, RETROGRADE PYELOGRAPHY, AND INSERTION OF STENT Bilateral 3/21/2020    Procedure: CYSTOSCOPY, WITH RETROGRADE PYELOGRAM,;  Surgeon: Leslie Balbuena MD;  Location: [facility] OR 1ST FLR;  Service: Urology;  Laterality: Bilateral;    DILATION OF NEPHROSTOMY TRACT Right 10/27/2022    Procedure: DILATION, NEPHROSTOMY TRACT;  Surgeon: Chirag Russ MD;  Location: [facility] OR 1ST FLR;  Service: Urology;  Laterality: Right;    ESOPHAGOGASTRODUODENOSCOPY  2014    ESOPHAGOGASTRODUODENOSCOPY  11/18/2020    ESOPHAGOGASTRODUODENOSCOPY N/A 11/18/2020    Procedure: ESOPHAGOGASTRODUODENOSCOPY (EGD);  Surgeon: Zenon Spencer MD;  Location: [facility];  Service: Endoscopy;  Laterality: N/A;    ESOPHAGOGASTRODUODENOSCOPY N/A 12/11/2020    Procedure: EGD (ESOPHAGOGASTRODUODENOSCOPY);  Surgeon: Juancho Muse MD;  Location: [facility] (2ND FLR);  Service: Endoscopy;  Laterality: N/A;    HYSTERECTOMY  2014    [facility] for cervical cancer    ILEOSTOMY  9/17/2020    Procedure: CREATION, ILEOSTOMY;  Surgeon: Hammad Reynolds MD;  Location: [facility] OR 2ND FLR;  Service: Colon and Rectal;;    LOOPOGRAM N/A 4/20/2021    Procedure: LOOPOGRAM;  Surgeon: Leslie Balbuena MD;  Location: [facility] OR 1ST FLR;  Service: Urology;  Laterality: N/A;    MOBILIZATION OF SPLENIC FLEXURE N/A 9/11/2020    Procedure: MOBILIZATION, COLONIC;  Surgeon: Hammad Reynolds MD;  Location: [facility] OR [address] [place] [address];  Service:  Colon and Rectal;  Laterality: N/A;    NEPHROSTOGRAPHY Bilateral 4/17/2021    Procedure: Nephrostogram;  Surgeon: Celeste Surgeon;  Location: [facility];  Service: Anesthesiology;  Laterality: Bilateral;    OOPHORECTOMY      PERCUTANEOUS NEPHROLITHOTOMY Right 4/21/2023    Procedure: NEPHROLITHOTOMY, PERCUTANEOUS;  Surgeon: Chirag Russ MD;  Location: [facility] OR 2ND FLR;  Service: Urology;  Laterality: Right;  2.5 hrs    PERCUTANEOUS NEPHROSTOMY  4/21/2023    Procedure: CREATION, NEPHROSTOMY, PERCUTANEOUS with removal of existing nephrostomy tube;  Surgeon: Chirag Russ MD;  Location: [facility] OR 2ND FLR;  Service: Urology;;    PYELOSCOPY Right 10/27/2022    Procedure: PYELOSCOPY;  Surgeon: Chirag Russ MD;  Location: [facility] OR 1ST FLR;  Service: Urology;  Laterality: Right;    REPLACEMENT OF NEPHROSTOMY TUBE Right 10/27/2022    Procedure: REPLACEMENT, NEPHROSTOMY TUBE;  Surgeon: Chirag Russ MD;  [facility];  Service: Urology;  Laterality: Right;    RETROPERITONEAL LYMPHADENECTOMY Right 9/17/2018    Procedure: LYMPHADENECTOMY, RETROPERITONEUM;  Surgeon: Sebas Reed MD;  Location: [facility] OR 2ND FLR;  Service: General;  Laterality: Right;  ILIAC    URETEROSCOPIC REMOVAL OF URETERIC CALCULUS Right 10/27/2022    Procedure: REMOVAL, CALCULUS, URETER, URETEROSCOPIC;  Surgeon: Chirag Russ MD;  [facility];  Service: Urology;  Laterality: Right;    URETEROSCOPY Right 10/27/2022    Procedure: URETEROSCOPY-ANTEGRADE;  Surgeon: Chirag Russ MD;  Location: [facility] OR [facility] FLR;  Service: Urology;  Laterality: Right;     Family History   Problem Relation Age of Onset    Heart disease Sister     Heart disease Maternal Grandmother     Colon cancer Father     Esophageal cancer Father     Cancer Father         Lung-smoker    Cancer Mother         Cervical    Cervical cancer Mother     Breast cancer Maternal Aunt     Suicide Daughter         jumped from parking structure    Drug  abuse Daughter     Drug abuse Daughter     Rectal cancer Neg Hx     Stomach cancer Neg Hx     Crohn's disease Neg Hx     Ulcerative colitis Neg Hx     Diabetes Neg Hx     Hypertension Neg Hx      Social History     Tobacco Use    Smoking status: Never    Smokeless tobacco: Never   Substance Use Topics    Alcohol use: No     Alcohol/week: 0.0 standard drinks    Drug use: No     Review of Systems  All other systems reviewed and were negative; see HPI also for additional ROS.    Physical Exam     Initial Vitals [05/11/23 1948]   BP Pulse Resp Temp SpO2   128/66 110 20 98.4 °F (36.9 °C) 97 %      MAP       --         Physical Exam    Nursing note and vitals reviewed.    Gen/Constitutional: Interactive. No acute distress  Head: Normocephalic, Atraumatic  Neck: supple, no masses or LAD, no JVD  Eyes: PERRLA, conjunctiva clear  Ears, Nose and Throat: No rhinorrhea or stridor.  Cardiac:  Regular rate, Reg Rhythm, No murmur  Pulmonary: CTA Bilat, no wheezes, rhonchi, rales.  No increased work of breathing.  GI: Abdomen soft, non-tender, non-distended; no rebound or guarding  : No CVA tenderness.  Musculoskeletal: Extremities warm, well perfused, no erythema, no edema  Skin: No rashes, cyanosis or jaundice.  Neuro: Alert and Oriented x 3; No focal motor or sensory deficits.    Psych: Normal affect      ED Course   Procedures  Labs Reviewed   COMPREHENSIVE METABOLIC PANEL - Abnormal; Notable for the following components:       Result Value    Chloride 115 (*)     CO2 9 (*)     BUN 22 (*)     Creatinine 1.6 (*)     Alkaline Phosphatase 155 (*)     eGFR 36.7 (*)     All other components within normal limits   URINALYSIS, REFLEX TO URINE CULTURE - Abnormal; Notable for the following components:    Appearance, UA Hazy (*)     Protein, UA 1+ (*)     Occult Blood UA Trace (*)     Leukocytes, UA 3+ (*)     All other components within normal limits    Narrative:     Specimen Source->Urine   ALCOHOL,MEDICAL (ETHANOL) - Abnormal;  Notable for the following components:    Alcohol, Serum 10 (*)     All other components within normal limits   ACETAMINOPHEN LEVEL - Abnormal; Notable for the following components:    Acetaminophen (Tylenol), Serum <3.0 (*)     All other components within normal limits   URINALYSIS MICROSCOPIC - Abnormal; Notable for the following components:    WBC, UA 24 (*)     All other components within normal limits    Narrative:     Specimen Source->Urine   CULTURE, URINE   DRUG SCREEN PANEL, URINE EMERGENCY    Narrative:     Specimen Source->Urine   TSH   CBC W/ AUTO DIFFERENTIAL   ISTAT CHEM8          Imaging Results    None          Medications   sodium chloride 0.9% bolus 1,000 mL 1,000 mL (has no administration in time range)   cefTRIAXone (ROCEPHIN) 1 g in dextrose 5 % in water (D5W) 5 % 50 mL IVPB (MB+) (has no administration in time range)     Medical Decision Making:   History:   I obtained history from: EMS provider.       <> Summary of History: Brought in for suicidal behavior, thoughts and ideation  Old Medical Records: I decided to obtain old medical records.  Initial Assessment:   Edita Riley he is a 58-year-old female with a history of anemia, anxiety, depression, history of DVT, fibromyalgia, emphysema, GERD, history of seizures, history of psychiatric admissions, osteoarthritis presenting with suicide ideation and suicidal plan following the death of her daughter.   Differential Diagnosis:   Suicidal attempt, suicidal behavior, suicide ideation, major depressive disorder, metabolic disorder  Independently Interpreted Test(s):   I have ordered and independently interpreted EKG Reading(s) - see prior notes  Clinical Tests:   Lab Tests: Ordered and Reviewed  Medical Tests: Ordered                 Emergent evaluation of patient presenting with depressed mood, depressed behavior and acute suicidal ideation.  Her daughter committed suicide today by gun and patient states that she would like to take  the gun in commit suicide as well.  She appears gravely disabled, severely depressed, with suicidal thoughts and plans.  Will obtain pec, psychiatric labs and ECG.  Urinalysis and drug screen as well as ethanol level sent.  Initial UA shows signs of UTI, will treat with ceftriaxone.  The remainder of the labs and ECG pending, will sign out to oncoming ED physician with final disposition pending labs and medical clearance.    Complexity:  High-risk       Clinical Impression:   Final diagnoses:  [Z00.8] Medical clearance for psychiatric admission  [R45.851] Suicidal ideation (Primary)  [F33.2] Severe episode of recurrent major depressive disorder, without psychotic features  [N30.00] Acute cystitis without hematuria        ED Disposition Condition    Transfer to Psych Facility Stable          ED Prescriptions    None       Follow-up Information    None         Juan Miguel Kumar DO, FAAEM  Emergency Staff Physician   Dept of Emergency Medicine   [facility]  Spectralink: [id number]        Disclaimer: This note has been generated using voice-recognition software. There may be typographical errors that have been missed during proof-reading.       Juan Miguel Kumar DO  05/12/23 1118

## 2023-05-15 LAB — BACTERIA UR CULT: ABNORMAL

## 2023-05-19 ENCOUNTER — HOSPITAL ENCOUNTER (EMERGENCY)
Facility: HOSPITAL | Age: 58
Discharge: HOME | End: 2023-05-20
Attending: EMERGENCY MEDICINE
Payer: MEDICAID

## 2023-05-19 DIAGNOSIS — N30.01 ACUTE CYSTITIS WITH HEMATURIA: Primary | ICD-10-CM

## 2023-05-19 LAB
ALBUMIN SERPL BCP-MCNC: 3.4 G/DL (ref 3.5–5.2)
ALP SERPL-CCNC: 164 U/L (ref 55–135)
ALT SERPL W/O P-5'-P-CCNC: 25 U/L (ref 10–44)
ANION GAP SERPL CALC-SCNC: 7 MMOL/L (ref 8–16)
AST SERPL-CCNC: 17 U/L (ref 10–40)
BACTERIA #/AREA URNS AUTO: ABNORMAL /HPF
BASOPHILS # BLD AUTO: 0.03 K/UL (ref 0–0.2)
BASOPHILS NFR BLD: 0.5 % (ref 0–1.9)
BILIRUB SERPL-MCNC: 0.2 MG/DL (ref 0.1–1)
BILIRUB UR QL STRIP: NEGATIVE
BUN SERPL-MCNC: 22 MG/DL (ref 6–20)
CALCIUM SERPL-MCNC: 9.1 MG/DL (ref 8.7–10.5)
CHLORIDE SERPL-SCNC: 114 MMOL/L (ref 95–110)
CLARITY UR REFRACT.AUTO: ABNORMAL
CO2 SERPL-SCNC: 19 MMOL/L (ref 23–29)
COLOR UR AUTO: YELLOW
CREAT SERPL-MCNC: 1.4 MG/DL (ref 0.5–1.4)
DIFFERENTIAL METHOD: ABNORMAL
EOSINOPHIL # BLD AUTO: 0.3 K/UL (ref 0–0.5)
EOSINOPHIL NFR BLD: 3.8 % (ref 0–8)
ERYTHROCYTE [DISTWIDTH] IN BLOOD BY AUTOMATED COUNT: 15.5 % (ref 11.5–14.5)
EST. GFR  (NO RACE VARIABLE): 43.1 ML/MIN/1.73 M^2
GLUCOSE SERPL-MCNC: 102 MG/DL (ref 70–110)
GLUCOSE UR QL STRIP: NEGATIVE
HCT VFR BLD AUTO: 39.5 % (ref 37–48.5)
HGB BLD-MCNC: 11.9 G/DL (ref 12–16)
HGB UR QL STRIP: NEGATIVE
HYALINE CASTS UR QL AUTO: 0 /LPF
IMM GRANULOCYTES # BLD AUTO: 0.01 K/UL (ref 0–0.04)
IMM GRANULOCYTES NFR BLD AUTO: 0.2 % (ref 0–0.5)
KETONES UR QL STRIP: ABNORMAL
LEUKOCYTE ESTERASE UR QL STRIP: ABNORMAL
LIPASE SERPL-CCNC: 26 U/L (ref 4–60)
LYMPHOCYTES # BLD AUTO: 1.4 K/UL (ref 1–4.8)
LYMPHOCYTES NFR BLD: 21.5 % (ref 18–48)
MCH RBC QN AUTO: 27.1 PG (ref 27–31)
MCHC RBC AUTO-ENTMCNC: 30.1 G/DL (ref 32–36)
MCV RBC AUTO: 90 FL (ref 82–98)
MICROSCOPIC COMMENT: ABNORMAL
MONOCYTES # BLD AUTO: 0.9 K/UL (ref 0.3–1)
MONOCYTES NFR BLD: 14.1 % (ref 4–15)
NEUTROPHILS # BLD AUTO: 3.9 K/UL (ref 1.8–7.7)
NEUTROPHILS NFR BLD: 59.9 % (ref 38–73)
NITRITE UR QL STRIP: NEGATIVE
NRBC BLD-RTO: 0 /100 WBC
PH UR STRIP: 7 [PH] (ref 5–8)
PLATELET # BLD AUTO: 279 K/UL (ref 150–450)
PMV BLD AUTO: 10 FL (ref 9.2–12.9)
POTASSIUM SERPL-SCNC: 4 MMOL/L (ref 3.5–5.1)
PROT SERPL-MCNC: 7.6 G/DL (ref 6–8.4)
PROT UR QL STRIP: ABNORMAL
RBC # BLD AUTO: 4.39 M/UL (ref 4–5.4)
RBC #/AREA URNS AUTO: 3 /HPF (ref 0–4)
SODIUM SERPL-SCNC: 140 MMOL/L (ref 136–145)
SP GR UR STRIP: 1.01 (ref 1–1.03)
SQUAMOUS #/AREA URNS AUTO: 1 /HPF
URN SPEC COLLECT METH UR: ABNORMAL
WBC # BLD AUTO: 6.52 K/UL (ref 3.9–12.7)
WBC #/AREA URNS AUTO: 7 /HPF (ref 0–5)

## 2023-05-19 PROCEDURE — 99284 PR EMERGENCY DEPT VISIT,LEVEL IV: ICD-10-PCS | Mod: GC,,, | Performed by: EMERGENCY MEDICINE

## 2023-05-19 PROCEDURE — 99285 EMERGENCY DEPT VISIT HI MDM: CPT

## 2023-05-19 PROCEDURE — 85025 COMPLETE CBC W/AUTO DIFF WBC: CPT | Performed by: STUDENT IN AN ORGANIZED HEALTH CARE EDUCATION/TRAINING PROGRAM

## 2023-05-19 PROCEDURE — 83690 ASSAY OF LIPASE: CPT | Performed by: STUDENT IN AN ORGANIZED HEALTH CARE EDUCATION/TRAINING PROGRAM

## 2023-05-19 PROCEDURE — 80053 COMPREHEN METABOLIC PANEL: CPT | Performed by: STUDENT IN AN ORGANIZED HEALTH CARE EDUCATION/TRAINING PROGRAM

## 2023-05-19 PROCEDURE — 81001 URINALYSIS AUTO W/SCOPE: CPT | Performed by: EMERGENCY MEDICINE

## 2023-05-19 PROCEDURE — 99284 EMERGENCY DEPT VISIT MOD MDM: CPT | Mod: GC,,, | Performed by: EMERGENCY MEDICINE

## 2023-05-20 VITALS
OXYGEN SATURATION: 98 % | WEIGHT: 185 LBS | BODY MASS INDEX: 27.32 KG/M2 | TEMPERATURE: 98 F | HEART RATE: 73 BPM | RESPIRATION RATE: 18 BRPM | DIASTOLIC BLOOD PRESSURE: 57 MMHG | SYSTOLIC BLOOD PRESSURE: 107 MMHG

## 2023-05-20 PROCEDURE — 25500020 PHARM REV CODE 255: Performed by: EMERGENCY MEDICINE

## 2023-05-20 PROCEDURE — 25000003 PHARM REV CODE 250: Performed by: STUDENT IN AN ORGANIZED HEALTH CARE EDUCATION/TRAINING PROGRAM

## 2023-05-20 RX ORDER — SULFAMETHOXAZOLE AND TRIMETHOPRIM 800; 160 MG/1; MG/1
1 TABLET ORAL
Status: COMPLETED | OUTPATIENT
Start: 2023-05-20 | End: 2023-05-20

## 2023-05-20 RX ORDER — SULFAMETHOXAZOLE AND TRIMETHOPRIM 800; 160 MG/1; MG/1
1 TABLET ORAL 2 TIMES DAILY
Qty: 14 TABLET | Refills: 0 | Status: SHIPPED | OUTPATIENT
Start: 2023-05-20 | End: 2023-05-27

## 2023-05-20 RX ADMIN — SULFAMETHOXAZOLE AND TRIMETHOPRIM 1 TABLET: 800; 160 TABLET ORAL at 04:05

## 2023-05-20 RX ADMIN — IOHEXOL 75 ML: 350 INJECTION, SOLUTION INTRAVENOUS at 02:05

## 2023-05-20 NOTE — ED NOTES
Pt resting quietly on stretcher; remains calm and cooperative. Sitter remains at bedside in direct visual contact, charting per protocol every 15 minutes. No equipment or belongings are in the patients room.  Pt denies needs or complaints at this time.  Bed locked in lowest position; side rails up and locked x 2.  Pt instructed to alert sitter or nurse for assistance and before attempting to get out of bed; verbalizes understanding.

## 2023-05-20 NOTE — ED NOTES
Care assumed & bedside report received. Pt AAO x 4 w/ RR even and unlabored on RA. VSS. Denies CP or SOB. Pt resting comfortably in NAD in ED bed low/ locked w/ side rails up. Pt denies SI or HI at this time. Denies delusions or hallucinations. Pt dressed in facility provided blue scrubs. Environmental safety protocol in place. Sitter at the bedside performing 15 min RR checks. PEC form w/ . Pt belongings in safe & closet.

## 2023-05-20 NOTE — ED NOTES
Pt has been changed into blue scrubs and items have been placed into personal belonging bag per hospital policy.   Items include: 1 pair of white slippers  -items have been placed into closet   Number Of Freeze-Thaw Cycles: 2 freeze-thaw cycles Render Post-Care Instructions In Note?: yes Duration Of Freeze Thaw-Cycle (Seconds): 3 Post-Care Instructions: I reviewed with the patient in detail post-care instructions. Patient is to wear sunprotection, and avoid picking at any of the treated lesions. Pt may apply Vaseline to crusted or scabbing areas. Detail Level: Detailed Consent: The patient's consent was obtained including but not limited to risks of crusting, scabbing, blistering, scarring, darker or lighter pigmentary change, recurrence, incomplete removal and infection.

## 2023-05-20 NOTE — ED PROVIDER NOTES
"Encounter Date: 5/19/2023       History     Chief Complaint   Patient presents with    Urinary Tract Infection     Pt coming from [facility] for reevaluation for UTI that she previously was discharged from this hospital. Staff says today that she "is just not getting any better". No symptoms explained by staff. Pt A+O x 4     Patient is a 58-year-old female with a past medical history of cervical cancer s/p XRT c/b bladder outlet obstruction 2/2 radiation fibrosis s/p urostomy, L nephrostomy tube, anemia, anxiety, depression, prior DVT, fibromyalgia, emphysema, GERD, seizures, previous psychiatric admissions and osteoarthritis presenting to the ED for persistent UTI. She was sent in [facility] for urine culture that was resistant to multiple abx. She was originally sent to [facility] on 5/11 for suicidal ideations after she recently found her daughter after daughter committed suicide. The patient states she is in the ED for abdominal pain and "something about [my] antibiotics" though cannot elaborate.    Review of patient's allergies indicates:   Allergen Reactions    Bee sting [allergen ext-venom-honey bee]      Rash      Grass pollen-bermuda, standard      rash     Past Medical History:   Diagnosis Date    Abnormal mammogram 08/25/2020    Acute blood loss anemia     Acute deep vein thrombosis (DVT) of lower extremity 12/09/2020    Advance care planning 04/30/2021    Anemia due to chronic blood loss     Anemia due to chronic kidney disease     Anxiety     Bilateral ureteral obstruction 9/11/2020    Cardiovascular event risk -- low 09/14/2015    ASCVD 10-year risk 1.9% (with optimal risk factors 1.3%) as of 9/14/2015     Cervical cancer 2014    Chronic back pain     Colostomy care     Deep vein thrombosis     Depression     Diarrhea due to malabsorption 11/14/2018    Difficult intubation     Disorder of kidney and ureter     DVT of lower extremity, bilateral 11/04/2020    Emphysema of " lung 4/10/2023    Fibromyalgia     Fungemia 09/27/2020    Generalized abdominal pain 08/25/2020    GERD (gastroesophageal reflux disease)     Hemifacial spasm 09/16/2015    Hiatal hernia 2014    History of cervical cancer 10/11/2018    Hx of psychiatric care     Cymbalta, trazodone    Hypertension     Hypomagnesemia 11/21/2018    Lactose intolerance     Metastatic squamous cell carcinoma to lymph node 10/11/2018    Neuropathy due to chemotherapeutic drug 11/14/2018    Osteoarthritis of back     Peritonitis 09/22/2020    Pseudomonas urinary tract infection 04/21/2021    Psychiatric problem     Refusal of blood transfusions as patient is [employer]     Schatzki's ring 09/14/2015    Seen on outside EGD 05/2014, underwent esophageal dilatation. Bx were negative.     Seizures     Sleep stage dysfunction     Noted on PSG 06/2017; negative for obstructive sleep apnea     Stroke     Urinary tract infection associated with nephrostomy catheter 06/11/2020    Wound infection after surgery 09/24/2020     Past Surgical History:   Procedure Laterality Date    ANTEGRADE NEPHROSTOGRAPHY Bilateral 9/28/2020    Procedure: Nephrostogram - antegrade;  Surgeon: Leslie Balbuena MD;  Location: [facility] OR [address] [place] [address];  Service: Urology;  Laterality: Bilateral;    ANTEGRADE NEPHROSTOGRAPHY Left 4/20/2021    Procedure: NEPHROSTOGRAM, ANTEGRADE;  Surgeon: Leslie Balbuena MD;  [facility];  Service: Urology;  Laterality: Left;    ANTEGRADE NEPHROSTOGRAPHY Right 10/27/2022    Procedure: NEPHROSTOGRAM, ANTEGRADE;  Surgeon: Chirag Russ MD;  [facility];  Service: Urology;  Laterality: Right;    ANTEGRADE NEPHROSTOGRAPHY Right 4/21/2023    Procedure: NEPHROSTOGRAM, ANTEGRADE;  Surgeon: Chirag Russ MD;  Location: [facility] OR 2ND FLR;  Service: Urology;  Laterality: Right;    BILATERAL OOPHORECTOMY  2015    CHOLECYSTECTOMY  11/09/2016    Done at [facility], path showed chronic cholecystitis and gallstones     cold knife conization  2014    COLECTOMY, RIGHT  9/17/2020    Procedure: COLECTOMY, RIGHT Extended;  Surgeon: Hammad Reynolds MD;  Location: [facility] OR 2ND FLR;  Service: Colon and Rectal;;    COLONOSCOPY  2014    COLONOSCOPY N/A 10/24/2016    at [facility], Dr Gutiérrez    COLONOSCOPY N/A 5/18/2018    Procedure: COLONOSCOPY;  Surgeon: Arden Gutiérrez MD;  Location: [facility] (4TH FLR);  Service: Endoscopy;  Laterality: N/A;    COLONOSCOPY N/A 7/28/2020    Procedure: COLONOSCOPY;  Surgeon: Hammad Reynolds MD;  Location: [facility] (4TH FLR);  Service: Colon and Rectal;  Laterality: N/A;  covid test [place] 7/25    CYSTOSCOPY WITH URETEROSCOPY, RETROGRADE PYELOGRAPHY, AND INSERTION OF STENT Bilateral 3/21/2020    Procedure: CYSTOSCOPY, WITH RETROGRADE PYELOGRAM,;  Surgeon: Leslie Balbuena MD;  Location: [facility] OR 1ST FLR;  Service: Urology;  Laterality: Bilateral;    DILATION OF NEPHROSTOMY TRACT Right 10/27/2022    Procedure: DILATION, NEPHROSTOMY TRACT;  Surgeon: Chirag Russ MD;  Location: [facility] OR 1ST FLR;  Service: Urology;  Laterality: Right;    ESOPHAGOGASTRODUODENOSCOPY  2014    ESOPHAGOGASTRODUODENOSCOPY  11/18/2020    ESOPHAGOGASTRODUODENOSCOPY N/A 11/18/2020    Procedure: ESOPHAGOGASTRODUODENOSCOPY (EGD);  Surgeon: Zenon Spencer MD;  Location: [facility];  Service: Endoscopy;  Laterality: N/A;    ESOPHAGOGASTRODUODENOSCOPY N/A 12/11/2020    Procedure: EGD (ESOPHAGOGASTRODUODENOSCOPY);  Surgeon: Juancho Muse MD;  Location: [facility] ENDO (2ND FLR);  Service: Endoscopy;  Laterality: N/A;    HYSTERECTOMY  2014    [facility] for cervical cancer    ILEOSTOMY  9/17/2020    Procedure: CREATION, ILEOSTOMY;  Surgeon: Hammad Reynolds MD;  Location: [facility] OR 2ND FLR;  Service: Colon and Rectal;;    LOOPOGRAM N/A 4/20/2021    Procedure: LOOPOGRAM;  Surgeon: Leslie Balbuena MD;  Location: [facility] OR 1ST FLR;  Service: Urology;  Laterality: N/A;    MOBILIZATION OF SPLENIC FLEXURE N/A 9/11/2020    Procedure: MOBILIZATION,  COLONIC;  Surgeon: Hammad Reynolds MD;  Location: [facility] OR 2ND FLR;  Service: Colon and Rectal;  Laterality: N/A;    NEPHROSTOGRAPHY Bilateral 4/17/2021    Procedure: Nephrostogram;  Surgeon: Celeste Surgeon;  Location: [facility] CELESTE;  Service: Anesthesiology;  Laterality: Bilateral;    OOPHORECTOMY      PERCUTANEOUS NEPHROLITHOTOMY Right 4/21/2023    Procedure: NEPHROLITHOTOMY, PERCUTANEOUS;  Surgeon: Chirag Russ MD;  Location: [facility] OR 2ND FLR;  Service: Urology;  Laterality: Right;  2.5 hrs    PERCUTANEOUS NEPHROSTOMY  4/21/2023    Procedure: CREATION, NEPHROSTOMY, PERCUTANEOUS with removal of existing nephrostomy tube;  Surgeon: Chirag Russ MD;  Location: [facility] OR 2ND FLR;  Service: Urology;;    PYELOSCOPY Right 10/27/2022    Procedure: PYELOSCOPY;  Surgeon: Chirag Russ MD;  [facility];  Service: Urology;  Laterality: Right;    REPLACEMENT OF NEPHROSTOMY TUBE Right 10/27/2022    Procedure: REPLACEMENT, NEPHROSTOMY TUBE;  Surgeon: Chirag Russ MD;  [facility];  Service: Urology;  Laterality: Right;    RETROPERITONEAL LYMPHADENECTOMY Right 9/17/2018    Procedure: LYMPHADENECTOMY, RETROPERITONEUM;  Surgeon: Sebas Reed MD;  Location: [facility] OR [facility] FLR;  Service: General;  Laterality: Right;  ILIAC    URETEROSCOPIC REMOVAL OF URETERIC CALCULUS Right 10/27/2022    Procedure: REMOVAL, CALCULUS, URETER, URETEROSCOPIC;  Surgeon: Chirag Russ MD;  [facility];  Service: Urology;  Laterality: Right;    URETEROSCOPY Right 10/27/2022    Procedure: URETEROSCOPY-ANTEGRADE;  Surgeon: Chirag Russ MD;  Location: [facility] OR 1ST FLR;  Service: Urology;  Laterality: Right;     Family History   Problem Relation Age of Onset    Heart disease Sister     Heart disease Maternal Grandmother     Colon cancer Father     Esophageal cancer Father     Cancer Father         Lung-smoker    Cancer Mother         Cervical    Cervical cancer Mother     Breast cancer  Maternal Aunt     Suicide Daughter         jumped from parking structure    Drug abuse Daughter     Drug abuse Daughter     Rectal cancer Neg Hx     Stomach cancer Neg Hx     Crohn's disease Neg Hx     Ulcerative colitis Neg Hx     Diabetes Neg Hx     Hypertension Neg Hx      Social History     Tobacco Use    Smoking status: Never    Smokeless tobacco: Never   Substance Use Topics    Alcohol use: No     Alcohol/week: 0.0 standard drinks    Drug use: No     Review of Systems    Physical Exam     Initial Vitals [05/19/23 2141]   BP Pulse Resp Temp SpO2   117/70 85 18 98.1 °F (36.7 °C) 100 %      MAP       --         Physical Exam    Nursing note and vitals reviewed.  Constitutional: She appears well-developed and well-nourished. She is not diaphoretic. No distress.   Well-appearing. Speaking full sentences. No acute distress.   HENT:   Head: Normocephalic and atraumatic.   Right Ear: External ear normal.   Left Ear: External ear normal.   Neck: Neck supple.   Cardiovascular:  Normal rate, regular rhythm, normal heart sounds and intact distal pulses.           Pulmonary/Chest: Breath sounds normal. No respiratory distress. She has no wheezes. She has no rhonchi. She has no rales.   Abdominal: Abdomen is soft. She exhibits no distension. A surgical scar is present. There is generalized abdominal tenderness.   Ostomy in place. Mild abdominal TTP diffusely. There is no rebound and no guarding.   Musculoskeletal:      Cervical back: Neck supple.     Neurological: She is alert and oriented to person, place, and time. GCS score is 15. GCS eye subscore is 4. GCS verbal subscore is 5. GCS motor subscore is 6.   Skin: Skin is warm. Capillary refill takes less than 2 seconds. No rash noted.   Psychiatric: She has a normal mood and affect.       ED Course   Procedures  Labs Reviewed   URINALYSIS, REFLEX TO URINE CULTURE - Abnormal; Notable for the following components:       Result Value    Appearance, UA Hazy (*)     Protein, UA  1+ (*)     Ketones, UA Trace (*)     Leukocytes, UA 1+ (*)     All other components within normal limits    Narrative:     Specimen Source->Urine   CBC W/ AUTO DIFFERENTIAL - Abnormal; Notable for the following components:    Hemoglobin 11.9 (*)     MCHC 30.1 (*)     RDW 15.5 (*)     All other components within normal limits   COMPREHENSIVE METABOLIC PANEL - Abnormal; Notable for the following components:    Chloride 114 (*)     CO2 19 (*)     BUN 22 (*)     Albumin 3.4 (*)     Alkaline Phosphatase 164 (*)     Anion Gap 7 (*)     eGFR 43.1 (*)     All other components within normal limits   URINALYSIS MICROSCOPIC - Abnormal; Notable for the following components:    WBC, UA 7 (*)     Bacteria Many (*)     All other components within normal limits    Narrative:     Specimen Source->Urine   LIPASE          Imaging Results               CT Abdomen Pelvis With Contrast (Final result)  Result time 05/20/23 03:56:18      Final result by Jeff Lam MD (05/20/23 03:56:18)                   Impression:      Interval removal of right nephrostomy tube.  Mild right-sided hydroureteronephrosis with mild inflammatory changes about the right ureter, similar to prior.    Left nephrostomy tube is in stable position.  No left-sided hydronephrosis.    This report was flagged in Epic as abnormal.    Electronically signed by resident: Kris Tai  Date:    05/20/2023  Time:    02:44    Electronically signed by: Jeff Lam MD  Date:    05/20/2023  Time:    03:56               Narrative:    EXAMINATION:  CT ABDOMEN PELVIS WITH CONTRAST    CLINICAL HISTORY:  Abdominal pain, acute, nonlocalized;    TECHNIQUE:  The patient was surveyed from the lung bases through the pelvis after the administration of 75 cc Omni 350 IV contrast. No oral contrast was administered. The data was reconstructed for coronal, sagittal, and axial images.    COMPARISON:  IR nephrostomy tube exchange 03/28/2023; CT abdomen pelvis 03/22/2023; NM PET-CT  08/13/2018    FINDINGS:  CHEST:    Lungs/Pleura: Stable 0.3 cm nodule within the right middle lobe when compared to prior PET-CT 08/31/2018.  bibasilar dependent atelectasis.  No focal consolidation or pleural effusion in the visualized lungs.    Heart: Normal size.  No pericardial effusion.    Thoracic soft tissues: No significant abnormality.    ABDOMEN/PELVIS:    Liver: Normal size with smooth contours.  Stable area of hypodensity adjacent to the falciform ligament, consistent with focal fatty infiltration.  Portal vasculature is patent.    Gallbladder: Surgically absent..    Bile ducts: No intrahepatic or extrahepatic biliary ductal dilatation.    Spleen: Normal size.    Pancreas: No mass.  No ductal dilatation. No peripancreatic fat stranding.    Adrenals: No significant abnormality.    Renal/Ureters: Kidneys are at the upper limit of normal size and enhance symmetrically.  Interval removal of right nephrostomy tube.  There is mild hydroureteronephrosis with minimal inflammatory changes about the proximal ureter..  Left nephrostomy tube is in similar position.  No left-sided hydroureteronephrosis.  Stable left renal simple cyst.  Postoperative changes of colon conduit with left lower quadrant colostomy.  The urinary bladder is not well distended..    Reproductive: Uterus is surgically absent.  No adnexal mass.    Stomach/Bowel: Partially distended with ingested contents.  Small bowel is normal caliber without obstruction or inflammation.  Postoperative changes of right hemicolectomy and right lower quadrant ileostomy.  Postoperative changes of colon conduit with left lower quadrant colostomy.  Remainder of the large bowel is normal caliber without obstruction or inflammation.    Peritoneum: No free fluid. No intraperitoneal free air.    Lymph Nodes: No pathologic robert enlargement in the abdomen or pelvis.    Vasculature: Abdominal aorta is normal in caliber.    Bones: No acute fractures or osseous destructive  lesions.    Soft Tissues: Postoperative changes anterior abdominal wall.                                       Medications   iohexoL (OMNIPAQUE 350) injection 75 mL (75 mLs Intravenous Given 5/20/23 2862)   sulfamethoxazole-trimethoprim 800-160mg per tablet 1 tablet (1 tablet Oral Given 5/20/23 4889)     Medical Decision Making:   History:   I obtained history from: someone other than patient.  Old Medical Records: I decided to obtain old medical records.  Old Records Summarized: records from previous admission(s).  Initial Assessment:   Emergent evaluation for urine culture results. She is afebrile and hemodynamically stable.  Differential Diagnosis:   UTI, pancreatitis unlikely, abdominal wall cellulitis, doubt colitis  Clinical Tests:   Lab Tests: Ordered and Reviewed  Radiological Study: Reviewed and Ordered  ED Management:  Workup unremarkable, including CT A/P with known operative interventions. Given dose of Bactrim for multidrug resistant Klebsiella UTI.  Prescription given.  Medically cleared to return to [facility].                        Clinical Impression:   Final diagnoses:  [N30.01] Acute cystitis with hematuria (Primary)      ED Disposition Condition    Transfer to [facility] Stable          ED Prescriptions       Medication Sig Dispense Start Date End Date Auth. Provider    sulfamethoxazole-trimethoprim 800-160mg (BACTRIM DS) 800-160 mg Tab Take 1 tablet by mouth 2 (two) times daily. for 7 days 14 tablet 5/20/2023 5/27/2023 Philip Calix MD          Follow-up Information    None          Philip Calix MD  Resident  05/20/23 4549

## 2023-05-20 NOTE — ED NOTES
Pt sleeping on stretcher. Sitter remains at bedside in direct visual contact, charting per protocol every 15 minutes. No equipment or belongings are in the patients room.    Bed locked in lowest position; side rails up and locked x 2.

## 2023-05-20 NOTE — ED NOTES
Pt resting quietly on stretcher; remains calm and cooperative. Sitter remains at bedside in direct visual contact, charting per protocol every 15 minutes. No equipment or belongings are in the patients room.  Pt denies needs or complaints at this time.  Bed locked in lowest position; side rails up and locked x 2.

## 2023-05-20 NOTE — ED TRIAGE NOTES
"Edita Riley, a 58 y.o. female presents to the ED w/ complaint of UTI. Patient coems from [facility] with an active CEC. Staff explains that the patient "isnt getting any better" No s/s. Patient [id number]    Adult Physical Assessment  LOC: Edita Riley, 58 y.o. female verified via two identifiers.  The patient is awake, alert, oriented and speaking appropriately at this time.  APPEARANCE: Patient resting comfortably and appears to be in no acute distress at this time. Patient is clean and well groomed, patient's clothing is properly fastened.  SKIN:The skin is warm and dry, color consistent with ethnicity, patient has normal skin turgor and moist mucus membranes, skin intact, no breakdown or brusing noted.  MUSCULOSKELETAL: Patient moving all extremities well, no obvious swelling or deformities noted.  RESPIRATORY: Airway is open and patent, respirations are spontaneous, patient has a normal effort and rate, no accessory muscle use noted.  CARDIAC: Patient has a normal rate and rhythm, no periphreal edema noted in any extremity, capillary refill < 3 seconds in all extremities  ABDOMEN: Soft and non tender to palpation, no abdominal distention noted. Bowel sounds present in all four quadrants. Patient has an ostomy bag and two nephrostomy tubes  NEUROLOGIC: Eyes open spontaneously, behavior appropriate to situation, follows commands, facial expression symmetrical, bilateral hand grasp equal and even, purposeful motor response noted, normal sensation in all extremities when touched with a finger.      Triage note:  Chief Complaint   Patient presents with    Urinary Tract Infection     Pt coming from [facility] for reevaluation for UTI that she previously was discharged from this hospital. Staff says today that she "is just not getting any better". No symptoms explained by staff. Pt A+O x 4     Review of patient's allergies indicates:   Allergen Reactions    Bee sting [allergen " ext-venom-honey bee]      Rash      Grass pollen-bermuda, standard      rash     Past Medical History:   Diagnosis Date    Abnormal mammogram 08/25/2020    Acute blood loss anemia     Acute deep vein thrombosis (DVT) of lower extremity 12/09/2020    Advance care planning 04/30/2021    Anemia due to chronic blood loss     Anemia due to chronic kidney disease     Anxiety     Bilateral ureteral obstruction 9/11/2020    Cardiovascular event risk -- low 09/14/2015    ASCVD 10-year risk 1.9% (with optimal risk factors 1.3%) as of 9/14/2015     Cervical cancer 2014    Chronic back pain     Colostomy care     Deep vein thrombosis     Depression     Diarrhea due to malabsorption 11/14/2018    Difficult intubation     Disorder of kidney and ureter     DVT of lower extremity, bilateral 11/04/2020    Emphysema of lung 4/10/2023    Fibromyalgia     Fungemia 09/27/2020    Generalized abdominal pain 08/25/2020    GERD (gastroesophageal reflux disease)     Hemifacial spasm 09/16/2015    Hiatal hernia 2014    History of cervical cancer 10/11/2018    Hx of psychiatric care     Cymbalta, trazodone    Hypertension     Hypomagnesemia 11/21/2018    Lactose intolerance     Metastatic squamous cell carcinoma to lymph node 10/11/2018    Neuropathy due to chemotherapeutic drug 11/14/2018    Osteoarthritis of back     Peritonitis 09/22/2020    Pseudomonas urinary tract infection 04/21/2021    Psychiatric problem     Refusal of blood transfusions as patient is [employer]     Ranjit's annette 09/14/2015    Seen on outside EGD 05/2014, underwent esophageal dilatation. Bx were negative.     Seizures     Sleep stage dysfunction     Noted on PSG 06/2017; negative for obstructive sleep apnea     Stroke     Urinary tract infection associated with nephrostomy catheter 06/11/2020    Wound infection after surgery 09/24/2020

## 2023-05-20 NOTE — ED NOTES
"Patient identifiers for Edita Riley 58 y.o. female checked and correct.  Chief Complaint   Patient presents with    Urinary Tract Infection     Pt coming from [facility] for reevaluation for UTI that she previously was discharged from this hospital. Staff says today that she "is just not getting any better". No symptoms explained by staff. Pt A+O x 4     Past Medical History:   Diagnosis Date    Abnormal mammogram 08/25/2020    Acute blood loss anemia     Acute deep vein thrombosis (DVT) of lower extremity 12/09/2020    Advance care planning 04/30/2021    Anemia due to chronic blood loss     Anemia due to chronic kidney disease     Anxiety     Bilateral ureteral obstruction 9/11/2020    Cardiovascular event risk -- low 09/14/2015    ASCVD 10-year risk 1.9% (with optimal risk factors 1.3%) as of 9/14/2015     Cervical cancer 2014    Chronic back pain     Colostomy care     Deep vein thrombosis     Depression     Diarrhea due to malabsorption 11/14/2018    Difficult intubation     Disorder of kidney and ureter     DVT of lower extremity, bilateral 11/04/2020    Emphysema of lung 4/10/2023    Fibromyalgia     Fungemia 09/27/2020    Generalized abdominal pain 08/25/2020    GERD (gastroesophageal reflux disease)     Hemifacial spasm 09/16/2015    Hiatal hernia 2014    History of cervical cancer 10/11/2018    Hx of psychiatric care     Cymbalta, trazodone    Hypertension     Hypomagnesemia 11/21/2018    Lactose intolerance     Metastatic squamous cell carcinoma to lymph node 10/11/2018    Neuropathy due to chemotherapeutic drug 11/14/2018    Osteoarthritis of back     Peritonitis 09/22/2020    Pseudomonas urinary tract infection 04/21/2021    Psychiatric problem     Refusal of blood transfusions as patient is [employer]     Ranjit's annette 09/14/2015    Seen on outside EGD 05/2014, underwent esophageal dilatation. Bx were negative.     Seizures     Sleep stage dysfunction     Noted on PSG 06/2017; " negative for obstructive sleep apnea     Stroke     Urinary tract infection associated with nephrostomy catheter 06/11/2020    Wound infection after surgery 09/24/2020     Allergies reported:   Review of patient's allergies indicates:   Allergen Reactions    Bee sting [allergen ext-venom-honey bee]      Rash      Grass pollen-bermuda, standard      rash       Appearance: Pt awake, alert & oriented to person, place & time. Pt in no acute distress at present time. Pt is clean and well groomed with clothes appropriately fastened. Pt states she is confused but cooperative   Skin: Skin warm, dry & intact. Color consistent with ethnicity. Mucous membranes moist. No breakdown or brusing noted.   Musculoskeletal: Patient moving all extremities well, no obvious swelling or deformities noted.   Respiratory: Respirations spontaneous, even, and non-labored. Visible chest rise noted. Airway is open and patent. No accessory muscle use noted.   Neurologic: Sensation is intact. Speech is clear and appropriate. Eyes open spontaneously, behavior appropriate to situation, follows commands, facial expression symmetrical, bilateral hand grasp equal and even, purposeful motor response noted.  Cardiac: All peripheral pulses present. No Bilateral lower extremity edema. Cap refill is <3 seconds.  Abdomen: Abdomen soft, non distended, pain with palpation, redness and tenderness around ostomy.   : Urine malodorous, sediment.

## 2023-05-20 NOTE — ED NOTES
Pt resting in bed. No complaints of pain or discomfort at this time. Food, drink, restroom offered. RR even and unlabored, VSS. Bed remains locked and in lowest position with bed rails up x 2. Sitter remains at bedside completing q 15 min assessments.

## 2023-05-21 NOTE — DISCHARGE SUMMARY
Discharge Summary  Hospital Medicine    Attending Provider on Discharge: Lucero Bah MD  [facility] Medicine Team: [facility] MED R  Date of Admission:  4/16/2023     Date of Discharge:  4/26/2023  Code status: Full Code    Active Hospital Problems    Diagnosis  POA    CKD (chronic kidney disease), stage III [N18.30]  Yes    Ileostomy in place [Z93.2]  Not Applicable     Chronic      Resolved Hospital Problems    Diagnosis Date Resolved POA    *Urinary tract infection associated with nephrostomy catheter [T83.512A, N39.0] 05/10/2023 Yes    Bilateral ureteral obstruction [N13.5] 05/10/2023 Yes     HPI  59 yo F with PMHx distal ureteral strictures due to XRT for cervical cancer s/p transverse colon conduit and B/L nephrostomy tubes complicated by MDR infections who presents to the ED complaining of R sided flank pain since today. Pt. has been seeing Urology for strictures, hydronephrosis, and nephrolithiasis. She was recently noted to have worsening R sided hydronephrosis with imaging revealing Right nephrostomy tube appears to be bypassing the renal parenchyma and directly entering the renal pelvis as well as a 4 mm R mid ureteral stone. She was to be direct admitted tomorrow  for R PCNL, R antegrade URS, stone basket extraction and with pre-op antibiotics. Pt. Reports that she developed severe R sided flank pain today, however, so she came to the ED tonight. She denies any fevers, chills, weakness, or changes in urine output. She just reports that her flank pain worsened to the point that she thought her kidneys were worsening, so she came to the hospital today.     Hospital Course  Urinary tract infection associated with nephrostomy catheter  Klesiella variocola infection  Enterococcus faecium  Patient With recurrent MDR infections related to nephrostomy tubes and chronic ureteral strictures. Urine culture 4/10 growing klebsiella and enterococcus. IV vancomycin and ertapenem started in ED. ID consulted. Urine cx 4/18  obtained from R nephrostomy tube now growing e faecium VRE. Unclear if klebsiella growing from urine cultures obtained from PCN tube collection bag is a colonizer/ contaminant vs true pathogen, but have been covering with ertapenem since 4/16 and did not grow in subsequent culture. Underwent Rt PCN drain removal, stone extraction and drain replacement. Ertapenem 1 g IV daily for total 10 days. ID recommended Bactrim if leaves before then. Last dose 4/26. Patient wished to complete IV antibiotic course prior to discharge.  Vanc switched to linezolid today to cover e faecium VRE; continue x 10 days. Last day 4/30/23.     CKD (chronic kidney disease), stage III  -Cr stable at 1.3, no acute issues     Ileostomy in place  -Pt. transverse colon conduit 2020 complicated by bowel perforation requiring hemicolectomy and ileostomy. No acute issues, ostomy bag in place. recurring ostomy leakage affecting her quality fo life. patient's  asked if this is reversible. Told him the urostomy is not reversible due to long-term damage from pelvic radiation for cervical cancer. Review CRS notes on events lead up to and operative note of ileostomy creation. She had much of colon removed and much adhesions. Likely not reversible. Refer to outpatient CRS evaluation to discuss reversibility     Bilateral ureteral obstruction  Pt. S/p transverson colono conduit and B/L nephrostomy tubes, now with worsening R sided hydronephrosis. Urology consulted, R PCNL (new access with IR arranged at the same time), R antegrade URS, stone basket extraction 4/21  -post-operative pain. Multi-modal control         Acetaminophen 1000 mg TID, gabapentin 200 mg TID, oxybutynin ER 10 mg daily and tamsulosin 0.4 mg daily - stopped scheduled acetaminophen and start percocet 5/325 mg q4h prn pain  - urology following daily. Right nephrostomy tube removed. Left  Nephrostomy tube to remain in place.  She was discharged on tamsulosin and oxybutynin for pain  associated from nephrostomy tubes    Procedures: nephrolithotomy 4/21 by urology    Consultants: urology and ID    Discharge Medication List as of 4/26/2023 12:47 PM        START taking these medications    Details   linezolid (ZYVOX) 600 mg Tab Take 1 tablet (600 mg total) by mouth every 12 (twelve) hours. for 5 days, Starting Tue 4/25/2023, Until Sun 4/30/2023, Normal      oxybutynin (DITROPAN-XL) 10 MG 24 hr tablet Take 1 tablet (10 mg total) by mouth once daily. For spasms associated with neprhostomy tubes, Starting Wed 4/26/2023, Normal      tamsulosin (FLOMAX) 0.4 mg Cap Take 1 capsule (0.4 mg total) by mouth once daily. For spasm associated with nephrostomy tubes, Starting Wed 4/26/2023, Until Thu 4/25/2024, Normal           CONTINUE these medications which have CHANGED    Details   acetaminophen (TYLENOL) 500 MG tablet Take 1 tablet (500 mg total) by mouth every 4 (four) hours as needed for Pain., Starting Tue 4/25/2023, Normal           CONTINUE these medications which have NOT CHANGED    Details   albuterol (VENTOLIN HFA) 90 mcg/actuation inhaler Inhale 2 puffs into the lungs every 6 (six) hours as needed for Wheezing or Shortness of Breath. Rescue, Starting Mon 4/10/2023, Normal      gabapentin (NEURONTIN) 100 MG capsule Take 2 capsules (200 mg total) by mouth every evening., Starting Thu 9/1/2022, No Print      melatonin (MELATIN) 3 mg tablet Take 2 tablets (6 mg total) by mouth nightly as needed for Insomnia., Starting Mon 9/6/2021, Normal      mirtazapine (REMERON SOL-TAB) 15 MG disintegrating tablet Dissolve 1 tablet (15 mg total) by mouth nightly., Starting Tue 2/15/2022, Until Mon 4/10/2023, Normal           STOP taking these medications       ketorolac (TORADOL) 10 mg tablet Comments:   Reason for Stopping:         sodium bicarbonate 650 MG tablet Comments:   Reason for Stopping:         sucralfate (CARAFATE) 1 gram tablet Comments:   Reason for Stopping:         traMADoL (ULTRAM) 50 mg tablet  Comments:   Reason for Stopping:               Discharge Diet:regular diet     Activity: activity as tolerated    Discharge Condition: Good    Disposition: Home or Self Care    Tests pending at the time of discharge: none      Time spent  on the discharge of the patient including review of hospital course with the patient. reviewing discharge medications and arranging follow-up care 35 min    Discharge examination Patient was seen and examined on the date of discharge and determined to be suitable for discharge.    Discharge plan   Urology  Infectious disease    Lucero Bah MD

## 2023-05-23 ENCOUNTER — HOSPITAL ENCOUNTER (EMERGENCY)
Facility: HOSPITAL | Age: 58
Discharge: HOME | End: 2023-05-24
Attending: EMERGENCY MEDICINE
Payer: MEDICAID

## 2023-05-23 VITALS
WEIGHT: 170 LBS | RESPIRATION RATE: 14 BRPM | HEART RATE: 91 BPM | SYSTOLIC BLOOD PRESSURE: 122 MMHG | TEMPERATURE: 98 F | BODY MASS INDEX: 25.1 KG/M2 | DIASTOLIC BLOOD PRESSURE: 52 MMHG | OXYGEN SATURATION: 100 %

## 2023-05-23 DIAGNOSIS — Z43.3 COLOSTOMY CARE: Primary | ICD-10-CM

## 2023-05-23 PROCEDURE — 99285 EMERGENCY DEPT VISIT HI MDM: CPT

## 2023-05-24 NOTE — ED PROVIDER NOTES
Encounter Date: 5/23/2023       History     Chief Complaint   Patient presents with    Wound Check     Pt is complaining of illiostomy pain due to taking the bag off and sticking her finger in it.  Pt comes from [facility] pt.      59 y/o F brought to ED by EMS from [facility]. Patient removed her ostomy bag, and reports it is now sensitive to touch. Denies any N/V, fever, diarrhea. No other symptoms reported at this time.     Review of patient's allergies indicates:   Allergen Reactions    Bee sting [allergen ext-venom-honey bee]      Rash      Grass pollen-bermuda, standard      rash     Past Medical History:   Diagnosis Date    Abnormal mammogram 08/25/2020    Acute blood loss anemia     Acute deep vein thrombosis (DVT) of lower extremity 12/09/2020    Advance care planning 04/30/2021    Anemia due to chronic blood loss     Anemia due to chronic kidney disease     Anxiety     Bilateral ureteral obstruction 9/11/2020    Cardiovascular event risk -- low 09/14/2015    ASCVD 10-year risk 1.9% (with optimal risk factors 1.3%) as of 9/14/2015     Cervical cancer 2014    Chronic back pain     Colostomy care     Deep vein thrombosis     Depression     Diarrhea due to malabsorption 11/14/2018    Difficult intubation     Disorder of kidney and ureter     DVT of lower extremity, bilateral 11/04/2020    Emphysema of lung 4/10/2023    Fibromyalgia     Fungemia 09/27/2020    Generalized abdominal pain 08/25/2020    GERD (gastroesophageal reflux disease)     Hemifacial spasm 09/16/2015    Hiatal hernia 2014    History of cervical cancer 10/11/2018    Hx of psychiatric care     Cymbalta, trazodone    Hypertension     Hypomagnesemia 11/21/2018    Lactose intolerance     Metastatic squamous cell carcinoma to lymph node 10/11/2018    Neuropathy due to chemotherapeutic drug 11/14/2018    Osteoarthritis of back     Peritonitis 09/22/2020    Pseudomonas urinary tract infection 04/21/2021    Psychiatric problem     Refusal of blood  transfusions as patient is Orthodox     Schatzki's ring 09/14/2015    Seen on outside EGD 05/2014, underwent esophageal dilatation. Bx were negative.     Seizures     Sleep stage dysfunction     Noted on PSG 06/2017; negative for obstructive sleep apnea     Stroke     Urinary tract infection associated with nephrostomy catheter 06/11/2020    Wound infection after surgery 09/24/2020     Past Surgical History:   Procedure Laterality Date    ANTEGRADE NEPHROSTOGRAPHY Bilateral 9/28/2020    Procedure: Nephrostogram - antegrade;  Surgeon: Leslie Balbuena MD;  Location: [facility] OR 1ST FLR;  Service: Urology;  Laterality: Bilateral;    ANTEGRADE NEPHROSTOGRAPHY Left 4/20/2021    Procedure: NEPHROSTOGRAM, ANTEGRADE;  Surgeon: Leslie Balbuena MD;  Location: [facility] OR 1ST FLR;  Service: Urology;  Laterality: Left;    ANTEGRADE NEPHROSTOGRAPHY Right 10/27/2022    Procedure: NEPHROSTOGRAM, ANTEGRADE;  Surgeon: Chirag Russ MD;  Location: [facility] OR 1ST FLR;  Service: Urology;  Laterality: Right;    ANTEGRADE NEPHROSTOGRAPHY Right 4/21/2023    Procedure: NEPHROSTOGRAM, ANTEGRADE;  Surgeon: Chirag Russ MD;  Location: [facility] OR 2ND FLR;  Service: Urology;  Laterality: Right;    BILATERAL OOPHORECTOMY  2015    CHOLECYSTECTOMY  11/09/2016    Done at [facility], path showed chronic cholecystitis and gallstones    cold knife conization  2014    COLECTOMY, RIGHT  9/17/2020    Procedure: COLECTOMY, RIGHT Extended;  Surgeon: Hammad Reynolds MD;  Location: [facility] OR 2ND FLR;  Service: Colon and Rectal;;    COLONOSCOPY  2014    [facility] N[facility]24[facility]2016    [facility]    COLONOSCOPY N/A 5/18/2018    Procedure: COLONOSCOPY;  Surgeon: Arden Gutiérrez MD;  Location: [facility] ENDO (4TH FLR);  Service: Endoscopy;  Laterality: N/A;    COLONOSCOPY N/A 7/28/2020    Procedure: COLONOSCOPY;  Surgeon: Hammad Reynolds MD;  Location: [facility] ENDO (4TH FLR);  Service: Colon and Rectal;  Laterality: N/A;  covid test [place] 7/25     CYSTOSCOPY WITH URETEROSCOPY, RETROGRADE PYELOGRAPHY, AND INSERTION OF STENT Bilateral 3/21/2020    Procedure: CYSTOSCOPY, WITH RETROGRADE PYELOGRAM,;  Surgeon: Leslie Balbuena MD;  Location: [facility] OR 1ST FLR;  Service: Urology;  Laterality: Bilateral;    DILATION OF NEPHROSTOMY TRACT Right 10/27/2022    Procedure: DILATION, NEPHROSTOMY TRACT;  Surgeon: Chirag Russ MD;  Location: [facility] OR 1ST FLR;  Service: Urology;  Laterality: Right;    ESOPHAGOGASTRODUODENOSCOPY  2014    ESOPHAGOGASTRODUODENOSCOPY  11/18/2020    ESOPHAGOGASTRODUODENOSCOPY N/A 11/18/2020    Procedure: ESOPHAGOGASTRODUODENOSCOPY (EGD);  Surgeon: Zenon Spencer MD;  Location: [facility];  Service: Endoscopy;  Laterality: N/A;    ESOPHAGOGASTRODUODENOSCOPY N/A 12/11/2020    Procedure: EGD (ESOPHAGOGASTRODUODENOSCOPY);  Surgeon: Juancho Muse MD;  Location: [facility] (2ND FLR);  Service: Endoscopy;  Laterality: N/A;    HYSTERECTOMY  2014    [facility] for cervical cancer    ILEOSTOMY  9/17/2020    Procedure: CREATION, ILEOSTOMY;  Surgeon: Hammad Reynolds MD;  Location: [facility] OR 2ND FLR;  Service: Colon and Rectal;;    LOOPOGRAM N/A 4/20/2021    Procedure: LOOPOGRAM;  Surgeon: Leslie Balbuena MD;  Location: [facility] OR 1ST FLR;  Service: Urology;  Laterality: N/A;    MOBILIZATION OF SPLENIC FLEXURE N/A 9/11/2020    Procedure: MOBILIZATION, COLONIC;  Surgeon: Hammad Reynolds MD;  Location: [facility] OR 2ND FLR;  Service: Colon and Rectal;  Laterality: N/A;    NEPHROSTOGRAPHY Bilateral 4/17/2021    Procedure: Nephrostogram;  Surgeon: Celeste Surgeon;  Location: [facility];  Service: Anesthesiology;  Laterality: Bilateral;    OOPHORECTOMY      PERCUTANEOUS NEPHROLITHOTOMY Right 4/21/2023    Procedure: NEPHROLITHOTOMY, PERCUTANEOUS;  Surgeon: Chirag Russ MD;  Location: [facility] OR 2ND FLR;  Service: Urology;  Laterality: Right;  2.5 hrs    PERCUTANEOUS NEPHROSTOMY  4/21/2023    Procedure: CREATION, NEPHROSTOMY, PERCUTANEOUS with removal of existing  nephrostomy tube;  Surgeon: Chirag Russ MD;  Location: [facility] OR 2ND FLR;  Service: Urology;;    PYELOSCOPY Right 10/27/2022    Procedure: PYELOSCOPY;  Surgeon: Chirag Russ MD;  Location: [facility] OR 1ST FLR;  Service: Urology;  Laterality: Right;    REPLACEMENT OF NEPHROSTOMY TUBE Right 10/27/2022    Procedure: REPLACEMENT, NEPHROSTOMY TUBE;  Surgeon: Chirag Russ MD;  Location: [facility] OR 1ST FLR;  Service: Urology;  Laterality: Right;    RETROPERITONEAL LYMPHADENECTOMY Right 9/17/2018    Procedure: LYMPHADENECTOMY, RETROPERITONEUM;  Surgeon: Sebas Reed MD;  Location: [facility] OR 2ND FLR;  Service: General;  Laterality: Right;  ILIAC    URETEROSCOPIC REMOVAL OF URETERIC CALCULUS Right 10/27/2022    Procedure: REMOVAL, CALCULUS, URETER, URETEROSCOPIC;  Surgeon: Chirag Russ MD;  [facility];  Service: Urology;  Laterality: Right;    URETEROSCOPY Right 10/27/2022    Procedure: URETEROSCOPY-ANTEGRADE;  Surgeon: Chirag Russ MD;  [facility];  Service: Urology;  Laterality: Right;     Family History   Problem Relation Age of Onset    Heart disease Sister     Heart disease Maternal Grandmother     Colon cancer Father     Esophageal cancer Father     Cancer Father         Lung-smoker    Cancer Mother         Cervical    Cervical cancer Mother     Breast cancer Maternal Aunt     Suicide Daughter         jumped from parking structure    Drug abuse Daughter     Drug abuse Daughter     Rectal cancer Neg Hx     Stomach cancer Neg Hx     Crohn's disease Neg Hx     Ulcerative colitis Neg Hx     Diabetes Neg Hx     Hypertension Neg Hx      Social History     Tobacco Use    Smoking status: Never    Smokeless tobacco: Never   Substance Use Topics    Alcohol use: No     Alcohol/week: 0.0 standard drinks    Drug use: No     Review of Systems   Constitutional:  Negative for chills and fever.   HENT:  Negative for congestion.    Respiratory:  Negative for cough and shortness of  breath.    Cardiovascular:  Negative for chest pain.   Musculoskeletal:  Negative for back pain.   Neurological:  Negative for light-headedness and headaches.     Physical Exam     Initial Vitals [05/23/23 1820]   BP Pulse Resp Temp SpO2   (!) 122/52 91 14 98.3 °F (36.8 °C) 100 %      MAP       --         Physical Exam    Nursing note and vitals reviewed.  Constitutional: She appears well-developed and well-nourished. No distress.   HENT:   Head: Normocephalic and atraumatic.   Eyes: Conjunctivae and EOM are normal. Pupils are equal, round, and reactive to light.   Neck: Neck supple. No tracheal deviation present.   Normal range of motion.  Cardiovascular:  Normal rate and intact distal pulses.           Pulmonary/Chest: No respiratory distress.   Abdominal: Abdomen is soft. She exhibits no distension.   Ostomy site at right side of abdomen without ostomy bag - pink, productive of stool, superficially tender with palpation, without surrounding abdominal TTP    Musculoskeletal:         General: No tenderness. Normal range of motion.      Cervical back: Normal range of motion and neck supple.     Neurological: She is alert and oriented to person, place, and time. She has normal strength. No cranial nerve deficit. GCS score is 15. GCS eye subscore is 4. GCS verbal subscore is 5. GCS motor subscore is 6.   Skin: Skin is warm and dry.       ED Course   Procedures  Labs Reviewed - No data to display              Medications - No data to display  Medical Decision Making:   History:   Old Medical Records: I decided to obtain old medical records.  Old Records Summarized: records from clinic visits.       <> Summary of Records: Reviewed most recent urology visit documenting baseline medications and PMH  Initial Assessment:   59 y/o F brought to the ER c/o pain to ostomy site  Differential Diagnosis:   Ostomy dysfunction  ED Management:  Exam fairly benign. We have replaced the ostomy bag. Patient in NAD at this time. VSS.  Will be discharged back to [facility].                         Clinical Impression:   Final diagnoses:  [Z43.3] Colostomy care (Primary)        ED Disposition Condition    Discharge Stable          ED Prescriptions    None       Follow-up Information       Follow up With Specialties Details Why Contact Info    Your primary care physician  Schedule an appointment as soon as possible for a visit                Sravan Gonzalez MD  05/23/23 1957

## 2023-05-25 ENCOUNTER — HOSPITAL ENCOUNTER (EMERGENCY)
Facility: HOSPITAL | Age: 58
Discharge: PSYCHIATRIC OR SUBSTANCE USE FACILITY | End: 2023-05-26
Attending: EMERGENCY MEDICINE
Payer: MEDICAID

## 2023-05-25 DIAGNOSIS — K94.13 ILEOSTOMY DYSFUNCTION: ICD-10-CM

## 2023-05-25 DIAGNOSIS — Z71.89 ENCOUNTER FOR OSTOMY CARE EDUCATION: Primary | ICD-10-CM

## 2023-05-25 DIAGNOSIS — L30.9 DERMATITIS: ICD-10-CM

## 2023-05-25 PROCEDURE — 99284 EMERGENCY DEPT VISIT MOD MDM: CPT | Mod: ,,, | Performed by: EMERGENCY MEDICINE

## 2023-05-25 PROCEDURE — 99283 EMERGENCY DEPT VISIT LOW MDM: CPT

## 2023-05-25 PROCEDURE — 99284 PR EMERGENCY DEPT VISIT,LEVEL IV: ICD-10-PCS | Mod: ,,, | Performed by: EMERGENCY MEDICINE

## 2023-05-25 NOTE — DISCHARGE INSTRUCTIONS
Thank you for choosing [facility]!     Our goal in the Emergency Department is to always provide outstanding medical care. You may receive a survey by mail or e-mail in the next week regarding your experience today. We would greatly appreciate you completing and returning the survey. Your feedback provides us with a way to recognize our staff who provide very good care, and it helps us learn how to improve when your experience was below our aspiration of excellence.      It is important to remember that some problems are difficult to diagnose and may not be found during your first visit. Be sure to follow up with your primary care doctor and review any labs/imaging that was performed during your visit with them. If you do not have a primary care doctor, you may contact the one listed on your discharge paperwork, or you may also call the [facility] Appointment Desk at [phone] to schedule an appointment.     All medications may potentially have side effects and it is impossible to predict which medications may give you side effects. If you feel that you are having a negative effect of any medication you should immediately stop taking them and seek medical attention.  Do not drive or make any important decisions for 24 hours if you have received any pain medications, sedatives or mood altering drugs during your ER visit.    We appreciate you trusting us with your medical care. We will be happy to take care of you for all of your future medical needs. You may return to the ER at any time for any new/concerning symptoms, worsening condition, or failure to improve. We hope you feel better soon.     Sincerely,    Selvin Oh Jr., MD  Board-Certified Emergency Medicine Physician  [facility]

## 2023-05-25 NOTE — ED NOTES
Pt ileostomy site cleaned clean 4x4 guaze applied. Pt skin around the ostomy site is slowly improving by keeping it clean and dry.

## 2023-05-25 NOTE — CONSULTS
[facility] Emergency Dept  Wound Care    Patient Name:  Edita Riley   MRN:  [id number]  Date: 5/25/2023  Diagnosis: <principal problem not specified>    History:     Past Medical History:   Diagnosis Date    Abnormal mammogram 08/25/2020    Acute blood loss anemia     Acute deep vein thrombosis (DVT) of lower extremity 12/09/2020    Advance care planning 04/30/2021    Anemia due to chronic blood loss     Anemia due to chronic kidney disease     Anxiety     Bilateral ureteral obstruction 9/11/2020    Cardiovascular event risk -- low 09/14/2015    ASCVD 10-year risk 1.9% (with optimal risk factors 1.3%) as of 9/14/2015     Cervical cancer 2014    Chronic back pain     Colostomy care     Deep vein thrombosis     Depression     Diarrhea due to malabsorption 11/14/2018    Difficult intubation     Disorder of kidney and ureter     DVT of lower extremity, bilateral 11/04/2020    Emphysema of lung 4/10/2023    Fibromyalgia     Fungemia 09/27/2020    Generalized abdominal pain 08/25/2020    GERD (gastroesophageal reflux disease)     Hemifacial spasm 09/16/2015    Hiatal hernia 2014    History of cervical cancer 10/11/2018    Hx of psychiatric care     Cymbalta, trazodone    Hypertension     Hypomagnesemia 11/21/2018    Lactose intolerance     Metastatic squamous cell carcinoma to lymph node 10/11/2018    Neuropathy due to chemotherapeutic drug 11/14/2018    Osteoarthritis of back     Peritonitis 09/22/2020    Pseudomonas urinary tract infection 04/21/2021    Psychiatric problem     Refusal of blood transfusions as patient is Sabianist     Schatzki's ring 09/14/2015    Seen on outside EGD 05/2014, underwent esophageal dilatation. Bx were negative.     Seizures     Sleep stage dysfunction     Noted on PSG 06/2017; negative for obstructive sleep apnea     Stroke     Urinary tract infection associated with nephrostomy catheter 06/11/2020    Wound infection after surgery 09/24/2020       Social History      Socioeconomic History    Marital status:      Spouse name: Hammad    Number of children: 2   Tobacco Use    Smoking status: Never    Smokeless tobacco: Never   Substance and Sexual Activity    Alcohol use: No     Alcohol/week: 0.0 standard drinks    Drug use: No    Sexual activity: Yes     Partners: Male     Birth control/protection: None     Comment:  19 years since    Social History Narrative    , twin daughters (1  2018), disabled due to childhood stroke, Jewish sophia     Social Determinants of Health     Financial Resource Strain: Low Risk     Difficulty of Paying Living Expenses: Not very hard   Food Insecurity: No Food Insecurity    Worried About Running Out of Food in the Last Year: Never true    Ran Out of Food in the Last Year: Never true   Transportation Needs: No Transportation Needs    Lack of Transportation (Medical): No    Lack of Transportation (Non-Medical): No   Physical Activity: Inactive    Days of Exercise per Week: 0 days    Minutes of Exercise per Session: 0 min   Stress: No Stress Concern Present    Feeling of Stress : Only a little   Social Connections: Moderately Isolated    Frequency of Communication with Friends and Family: More than three times a week    Frequency of Social Gatherings with Friends and Family: More than three times a week    Attends [employer] Services: Never    Active Member of Clubs or Organizations: No    Attends Club or Organization Meetings: Never    Marital Status:    Housing Stability: Low Risk     Unable to Pay for Housing in the Last Year: No    Number of Places Lived in the Last Year: 1    Unstable Housing in the Last Year: No       Precautions:     Allergies as of 2023 - Reviewed 2023   Allergen Reaction Noted    Bee sting [allergen ext-venom-honey bee]  2015    Grass pollen-bermuda, standard  2015       [facility] Assessment Details/Treatment     Patient seen for ostomy care consult.  She has had her stoma for 5 years and the ileo has constantly given her trouble. She has seen the outpatient clinic nurse who helped to get a pouch seal in the past. Her skin is intact but very scalded from continuous stool. Her stoma is pink and moist approx 30 mm and above skin level. Applied skin prep to seal the skin. Applied flat pouch with 30 mm convex ring. Seal intact at present. Urostomy is also pink and moist but flush with the skin. Applied fernando ring and flat pouch and seal intact.   Please call or secure chat with any issues or concerns. Additional supplies left at bedside.    05/25/2023

## 2023-05-25 NOTE — ED PROVIDER NOTES
Emergency Department Encounter  Provider Note    Edita Riley  [id number]  5/25/2023    Evaluation:    History:     Chief Complaint   Patient presents with    Wound Infection     From [facility], facility believes colostomy bag is infected. Pt. Arrives with a CEC, written 5/12/23 at 0735       History of Present Illness:  Edita Riley is a 58 y.o. 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), Difficult intubation, Disorder of kidney and ureter, DVT of lower extremity, bilateral (11/04/2020), Emphysema of lung (4/10/2023), Fibromyalgia, Fungemia (09/27/2020), Generalized abdominal pain (08/25/2020), GERD (gastroesophageal reflux disease), Hemifacial spasm (09/16/2015), Hiatal hernia (2014), History of cervical cancer (10/11/2018), psychiatric care, Hypertension, Hypomagnesemia (11/21/2018), Lactose intolerance, Metastatic squamous cell carcinoma to lymph node (10/11/2018), Neuropathy due to chemotherapeutic drug (11/14/2018), Osteoarthritis of back, Peritonitis (09/22/2020), Pseudomonas urinary tract infection (04/21/2021), Psychiatric problem, Refusal of blood transfusions as patient is Holiness, Schatzki's ring (09/14/2015), Seizures, Sleep stage dysfunction, Stroke, Urinary tract infection associated with nephrostomy catheter (06/11/2020), and Wound infection after surgery (09/24/2020).    The patient presents to the ED due to concern for skin infection.   Patient has a history of ileostomy and urostomy since 2020.   She was  recently seen in the ED for suicidal ideation, placed under PEC and transferred to [facility].   She was sent to the ED from the psych facility because the skin around her ileostomy site has been red, and they are having difficulty getting the ostomy bag to stick to her skin.  She is currently on antibiotics for a urine infection.  She denies any associated abdominal pain, fever, or change in ostomy output.    Past Medical History:   Diagnosis Date    Abnormal mammogram 08/25/2020    Acute blood loss anemia     Acute deep vein thrombosis (DVT) of lower extremity 12/09/2020    Advance care planning 04/30/2021    Anemia due to chronic blood loss     Anemia due to chronic kidney disease     Anxiety     Bilateral ureteral obstruction 9/11/2020    Cardiovascular event risk -- low 09/14/2015    ASCVD 10-year risk 1.9% (with optimal risk factors 1.3%) as of 9/14/2015     Cervical cancer 2014    Chronic back pain     Colostomy care     Deep vein thrombosis     Depression     Diarrhea due to malabsorption 11/14/2018    Difficult intubation     Disorder of kidney and ureter     DVT of lower extremity, bilateral 11/04/2020    Emphysema of lung 4/10/2023    Fibromyalgia     Fungemia 09/27/2020    Generalized abdominal pain 08/25/2020    GERD (gastroesophageal reflux disease)     Hemifacial spasm 09/16/2015    Hiatal hernia 2014    History of cervical cancer 10/11/2018    Hx of psychiatric care     Cymbalta, trazodone    Hypertension     Hypomagnesemia 11/21/2018    Lactose intolerance     Metastatic squamous cell carcinoma to lymph node 10/11/2018    Neuropathy due to chemotherapeutic drug 11/14/2018    Osteoarthritis of back     Peritonitis 09/22/2020    Pseudomonas urinary tract infection 04/21/2021    Psychiatric problem     Refusal of blood transfusions as patient is Jainism     Estephanieki's ring 09/14/2015    Seen on outside EGD 05/2014, underwent esophageal dilatation. Bx were negative.     Seizures      Sleep stage dysfunction     Noted on PSG 06/2017; negative for obstructive sleep apnea     Stroke     Urinary tract infection associated with nephrostomy catheter 06/11/2020    Wound infection after surgery 09/24/2020     Past Surgical History:   Procedure Laterality Date    ANTEGRADE NEPHROSTOGRAPHY Bilateral 9/28/2020    Procedure: Nephrostogram - antegrade;  Surgeon: Leslie Balbuena MD;  Location: [facility] OR 1ST FLR;  Service: Urology;  Laterality: Bilateral;    ANTEGRADE NEPHROSTOGRAPHY Left 4/20/2021    Procedure: NEPHROSTOGRAM, ANTEGRADE;  Surgeon: Leslie Balbuena MD;  Location: [facility] OR 1ST FLR;  Service: Urology;  Laterality: Left;    ANTEGRADE NEPHROSTOGRAPHY Right 10/27/2022    Procedure: NEPHROSTOGRAM, ANTEGRADE;  Surgeon: Chirag Russ MD;  Location: [facility] OR 1ST FLR;  Service: Urology;  Laterality: Right;    ANTEGRADE NEPHROSTOGRAPHY Right 4/21/2023    Procedure: NEPHROSTOGRAM, ANTEGRADE;  Surgeon: Chirag Russ MD;  Location: [facility] OR 2ND FLR;  Service: Urology;  Laterality: Right;    BILATERAL OOPHORECTOMY  2015    CHOLECYSTECTOMY  11/09/2016    Done at [facility], path showed chronic cholecystitis and gallstones    cold knife conization  2014    COLECTOMY, RIGHT  9/17/2020    Procedure: COLECTOMY, RIGHT Extended;  Surgeon: Hammad Reynolds MD;  Location: [facility] OR 2ND FLR;  Service: Colon and Rectal;;    COLONOSCOPY  2014    COLONOSCOPY N/A 10/24/2016    at [facility]    COLONOSCOPY N/A 5/18/2018    Procedure: COLONOSCOPY;  Surgeon: Arden Gutiérrez MD;  Location: [facility] (4TH FLR);  Service: Endoscopy;  Laterality: N/A;    COLONOSCOPY N/A 7/28/2020    Procedure: COLONOSCOPY;  Surgeon: Hammad Reynolds MD;  Location: [facility] ENDO (4TH FLR);  Service: Colon and Rectal;  Laterality: N/A;  covid test [place] 7/25    CYSTOSCOPY WITH URETEROSCOPY, RETROGRADE PYELOGRAPHY, AND INSERTION OF STENT Bilateral 3/21/2020    Procedure: CYSTOSCOPY, WITH RETROGRADE PYELOGRAM,;  Surgeon: Leslie Balbuena,  MD;  Location: [facility] OR 1ST FLR;  Service: Urology;  Laterality: Bilateral;    DILATION OF NEPHROSTOMY TRACT Right 10/27/2022    Procedure: DILATION, NEPHROSTOMY TRACT;  Surgeon: Chirag Russ MD;  Location: [facility] OR 1ST FLR;  Service: Urology;  Laterality: Right;    ESOPHAGOGASTRODUODENOSCOPY  2014    ESOPHAGOGASTRODUODENOSCOPY  11/18/2020    ESOPHAGOGASTRODUODENOSCOPY N/A 11/18/2020    Procedure: ESOPHAGOGASTRODUODENOSCOPY (EGD);  Surgeon: Zenon Spencer MD;  Location: [facility] ENDO;  Service: Endoscopy;  Laterality: N/A;    ESOPHAGOGASTRODUODENOSCOPY N/A 12/11/2020    Procedure: EGD (ESOPHAGOGASTRODUODENOSCOPY);  Surgeon: Juancho Muse MD;  Location: [facility] (2ND FLR);  Service: Endoscopy;  Laterality: N/A;    HYSTERECTOMY  2014    [facility] for cervical cancer    ILEOSTOMY  9/17/2020    Procedure: CREATION, ILEOSTOMY;  Surgeon: Hammad Reynolds MD;  Location: [facility] OR 2ND FLR;  Service: Colon and Rectal;;    LOOPOGRAM N/A 4/20/2021    Procedure: LOOPOGRAM;  Surgeon: Leslie Balbuena MD;  Location: [facility] OR 1ST FLR;  Service: Urology;  Laterality: N/A;    MOBILIZATION OF SPLENIC FLEXURE N/A 9/11/2020    Procedure: MOBILIZATION, COLONIC;  Surgeon: Hammad Reynolds MD;  Location: [facility] OR 2ND FLR;  Service: Colon and Rectal;  Laterality: N/A;    NEPHROSTOGRAPHY Bilateral 4/17/2021    Procedure: Nephrostogram;  Surgeon: Celeste Surgeon;  Location: [facility];  Service: Anesthesiology;  Laterality: Bilateral;    OOPHORECTOMY      PERCUTANEOUS NEPHROLITHOTOMY Right 4/21/2023    Procedure: NEPHROLITHOTOMY, PERCUTANEOUS;  Surgeon: Chirag Russ MD;  Location: [facility] OR 2ND FLR;  Service: Urology;  Laterality: Right;  2.5 hrs    PERCUTANEOUS NEPHROSTOMY  4/21/2023    Procedure: CREATION, NEPHROSTOMY, PERCUTANEOUS with removal of existing nephrostomy tube;  Surgeon: Chirag Russ MD;  Location: [facility] OR 2ND FLR;  Service: Urology;;    PYELOSCOPY Right 10/27/2022    Procedure: PYELOSCOPY;  Surgeon: Chirag Russ,  MD;  Location: [facility] OR 1ST FLR;  Service: Urology;  Laterality: Right;    REPLACEMENT OF NEPHROSTOMY TUBE Right 10/27/2022    Procedure: REPLACEMENT, NEPHROSTOMY TUBE;  Surgeon: Chirag Russ MD;  Location: [facility] OR 1ST FLR;  Service: Urology;  Laterality: Right;    RETROPERITONEAL LYMPHADENECTOMY Right 2018    Procedure: LYMPHADENECTOMY, RETROPERITONEUM;  Surgeon: Sebas Reed MD;  Location: [facility] OR 2ND FLR;  Service: General;  Laterality: Right;  ILIAC    URETEROSCOPIC REMOVAL OF URETERIC CALCULUS Right 10/27/2022    Procedure: REMOVAL, CALCULUS, URETER, URETEROSCOPIC;  Surgeon: Chirag Russ MD;  Location: [facility] OR 1ST FLR;  Service: Urology;  Laterality: Right;    URETEROSCOPY Right 10/27/2022    Procedure: URETEROSCOPY-ANTEGRADE;  Surgeon: Chirag Russ MD;  [facility];  Service: Urology;  Laterality: Right;     Family History   Problem Relation Age of Onset    Heart disease Sister     Heart disease Maternal Grandmother     Colon cancer Father     Esophageal cancer Father     Cancer Father         Lung-smoker    Cancer Mother         Cervical    Cervical cancer Mother     Breast cancer Maternal Aunt     Suicide Daughter         jumped from parking structure    Drug abuse Daughter     Drug abuse Daughter     Rectal cancer Neg Hx     Stomach cancer Neg Hx     Crohn's disease Neg Hx     Ulcerative colitis Neg Hx     Diabetes Neg Hx     Hypertension Neg Hx      Social History     Socioeconomic History    Marital status:      Spouse name: Hammad    Number of children: 2   Tobacco Use    Smoking status: Never    Smokeless tobacco: Never   Substance and Sexual Activity    Alcohol use: No     Alcohol/week: 0.0 standard drinks    Drug use: No    Sexual activity: Yes     Partners: Male     Birth control/protection: None     Comment:  19 years since    Social History Narrative    , twin daughters (1  2018), disabled due to childhood stroke,  Rastafarian sophia     Social Determinants of Health     Financial Resource Strain: Low Risk     Difficulty of Paying Living Expenses: Not very hard   Food Insecurity: No Food Insecurity    Worried About Running Out of Food in the Last Year: Never true    Ran Out of Food in the Last Year: Never true   Transportation Needs: No Transportation Needs    Lack of Transportation (Medical): No    Lack of Transportation (Non-Medical): No   Physical Activity: Inactive    Days of Exercise per Week: 0 days    Minutes of Exercise per Session: 0 min   Stress: No Stress Concern Present    Feeling of Stress : Only a little   Social Connections: Moderately Isolated    Frequency of Communication with Friends and Family: More than three times a week    Frequency of Social Gatherings with Friends and Family: More than three times a week    Attends Gnosticism Services: Never    Active Member of Clubs or Organizations: No    Attends Club or Organization Meetings: Never    Marital Status:    Housing Stability: Low Risk     Unable to Pay for Housing in the Last Year: No    Number of Places Lived in the Last Year: 1    Unstable Housing in the Last Year: No     Review of patient's allergies indicates:   Allergen Reactions    Bee sting [allergen ext-venom-honey bee]      Rash      Grass pollen-bermuda, standard      rash       Review of Systems   Skin:  Positive for color change and rash. Negative for wound.     Physical Exam:     Initial Vitals [05/25/23 1138]   BP Pulse Resp Temp SpO2   118/80 81 17 98.6 °F (37 °C) 100 %      MAP       --         Physical Exam    Nursing note and vitals reviewed.  Constitutional: She appears well-developed and well-nourished. She is not diaphoretic. No distress.   HENT:   Head: Normocephalic and atraumatic.   Mouth/Throat: Oropharynx is clear and moist.   Eyes: EOM are normal. Pupils are equal, round, and reactive to light.   Neck: No tracheal deviation present.   Cardiovascular:  Normal rate,  regular rhythm, normal heart sounds and intact distal pulses.           Pulmonary/Chest: Breath sounds normal. No stridor. No respiratory distress. She has no wheezes.   Abdominal: Abdomen is soft. Bowel sounds are normal. She exhibits no distension and no mass. There is no abdominal tenderness.   Ileostomy to right lower quadrant, stoma is pink and well perfused.  There is surrounding erythema and chronic appearing darkened and thickened skin adjacent to the stoma site, most consistent with chronic dermatitis.  Left lower quadrant urostomy site is well-appearing without surrounding erythema, swelling, or tenderness.       Musculoskeletal:         General: No edema. Normal range of motion.     Neurological: She is alert and oriented to person, place, and time. She has normal strength. No cranial nerve deficit or sensory deficit.   Skin: Skin is warm and dry. Capillary refill takes less than 2 seconds. No pallor.   Psychiatric: She has a normal mood and affect. Her behavior is normal. Thought content normal.       ED Course:   Procedures    Medical Decision Making:    History Acquisition:   Additional historians utilized:  [facility] staff, see HPI    Prior medical records were reviewed:   Seen in ED 5/23 for abdominal pain around colostomy bag, bag replaced. Seen in ED 5/19 for UTI, ABX given. CT A/P without acute process.  Seen in ED 5/11 for SI, placed under PEC, transferred to Psych facility.   Urology visit 5/10 for L hydronephrosis, scheduled for future revision due to ureteral stricture    The patient's list of active medical problems, social history, medications, and allergies as documented has been reviewed.     Differential Diagnoses:   Based on available information and initial assessment, Differential Diagnosis includes, but is not limited to:  AAA, aortic dissection, mesenteric ischemia, perforated viscous, MI/ACS, SBO/volvulus, incarcerated/strangulated hernia, intussusception, ileus,  appendicitis, cholecystitis, cholangitis, diverticulitis, esophagitis, hepatitis, nephrolithiasis, pancreatitis, gastroenteritis, colitis, IBD/IBS, biliary colic, GERD, PUD, constipation, UTI/pyelonephritis,  disorder.        EKG:       Labs:   Labs Reviewed - No data to display  Independent review of the labs ordered include:   See ED course    Imaging:     Imaging Results    None            Additional Consideration:   Additional testing considered during clinical course: labs/imaging considered but not indicated secondary to normal vitals and reassuring exam    Social determinants of health considered during development of treatment plan include: poor access to care    Current co-morbidities considered which impacted clinical decision making: fibromyalgia, depression, cervical cancer s/p ileostomy and urostomy creation, anemia, CKD    Case discussed with additional provider: none    Medications - No data to display     ED Course as of 05/25/23 1655   Thu May 25, 2023   1204 SpO2: 100 % [SS]   1204 Resp: 17 [SS]   1204 Pulse: 81 [SS]   1204 Temp: 98.6 °F (37 °C) [SS]   1204 BP: 118/80  Vitals reassuring on arrival.  [SS]   1347 No indication for labs/imaging. Awaiting ostomy/wound care eval to assist with supplies.  [SS]      ED Course User Index  [SS] Selvin Oh MD       Medical Decision Making:   Initial Assessment:   59 yo F with skin irritation around ileostomy site. Per [facility]'s, they are having trouble getting the ostomy bag to stick.  Exam is most consistent with chronic dermatitis around ostomy site.   Vitals reassuring.  Will attempt to consult Ostomy/Wound Care nurse for assistance with supplies.  ED Management:  Ostomy nurse evaluated and placed more appropriately fitting bag. Patient also provided with additional bags.  Patient stable for D/C.    On re-evaluation, the patient's status has improved.  After complete ED evaluation, clinical impression is most consistent with ostomy evaluation.  PCP  follow-up within 2-3 days was recommended.    After taking into careful account the patient's history, physical exam findings, as well as empirical and objective data obtained throughout ED workup, I feel no emergent medical condition has been identified. No further evaluation or admission was felt to be required, and the patient is stable for discharge from the ED. The patient and any additional family present were updated with test results, overall clinical impression, and recommended further plan of care, including discharge instructions as provided and outpatient follow-up for continued evaluation and management as needed. All questions were answered. The patient expressed understanding and agreed with current plan for discharge and follow-up plan of care. Strict ED return precautions were provided, including return/worsening of current symptoms, new symptoms, or any other concerns.                 Clinical Impression:       ICD-10-CM ICD-9-CM   1. Encounter for ostomy care education  Z71.89 V65.49   2. Dermatitis  L30.9 692.9   3. Ileostomy dysfunction  K94.13 569.62         Follow-up Information       Follow up With Specialties Details Why Contact Info Additional Information    [facility] Schedule an appointment as soon as possible for a visit   [address]  [facility] [id number] [phone] [facility] Please park in surface lot and check in at central registration desk             ED Disposition Condition    Discharge Stable               Selvin Oh MD  05/25/23 9440

## 2023-05-25 NOTE — ED NOTES
Pt resting comfortably in ED bed in NAD. RR even, unlabored, equal bilaterally on inspiration and expiration. Bed in lowest and locked position w/ side rails up x2. Environment safe - remains in direct visualization of Salinas shaw RN. Denies any complaints at this time. Care ongoing.

## 2023-05-25 NOTE — ED NOTES
"C/C: 58 y.o. female arrived to the ED via EMS from [facility] for wound evaluation. Pt w/ hx of urostomy and colostomy presents for further medical evaluation of stomas. Per EMS, transferring facility reported change in appearance of stoma w/ drainage that appeared to infectious and "needing a culture." Pt endorses subjective fever and chills. Denies changes in output.    APPEARANCE: awake, alert, and appears to be in mild discomfort. Hx of depression - denies SI at this time  SKIN: warm and dry. +two pink/red stomas to abdomen appearing non-infectious. +excoriation to abdomen surrounding stoma site  MUSCULOSKELETAL: Pt moving all extremities spontaneously, no obvious swelling or deformities noted. Ambulates independently.  RESPIRATORY: Airway open and patent. Denies shortness of breath. Respirations even, unlabored, equal bialterally on inspiration.   CARDIAC: Regular HR. Denies CP; no peripheral edema  ABDOMEN: S/ND/NT, Denies N/V/D. +decrease in appetite. +ileostomy   : +urostomy and nephrostomy   NEUROLOGIC: AAO x 4; speaking and following commands appropriately. Equal strength in all extremities; denies numbness/tingling.   "

## 2023-05-25 NOTE — ED NOTES
Pt presents to the ED from [facility] for evaluation of the abd stoma's. Pt has a flat affect, she endorses feelings of depression and SI's without a specific plan. Pt has a PEC/CEC which expires tonight at 2200. Pt states she will sign herself in as a FVA, she would like to continue her care at [facility]. Pt endorses poor sleep and appetite. Pt changed into a hospital gown. MD at the bedside, wound care consult to follow.

## 2023-05-25 NOTE — ED NOTES
"The patient is recv'd lying awake in bed. The bed is in the lowest locked position w/ both side rails in the upright position. She is attired in a hospital gown w/ fair grooming & hygiene. Her affect is blunted, mood is depressed. She endorses SI w/ a plan "to do the same thing she did." She denies HI, VH. She does endorse AH hearing the voices of her  twin daughters. She is oriented in all spheres. She states that her daughter committed suicide recently by shooting herself in the head. Her other twin daughter also committed suicide a few years ago. She states that her  is trying to find another apartment as patient states that she can't remain in the apartment because of the memories of her daughter's suicide. Supportive environment maintained.  She is engaging upon approach. Stomas remain intact, no leakage noted. DVC is maintained for safety.  "

## 2023-05-26 VITALS
DIASTOLIC BLOOD PRESSURE: 61 MMHG | WEIGHT: 182 LBS | TEMPERATURE: 98 F | BODY MASS INDEX: 26.88 KG/M2 | OXYGEN SATURATION: 98 % | RESPIRATION RATE: 18 BRPM | HEART RATE: 88 BPM | SYSTOLIC BLOOD PRESSURE: 120 MMHG

## 2023-05-26 NOTE — ED NOTES
Eyes closed, rise/fall of chest noted. NAD. DVC maintained for safety. Awaiting transfer.   Telephone Encounter by Paulette Badillo RN at 06/13/18 04:11 PM     Author:  Paulette Badillo RN Service:  (none) Author Type:  Registered Nurse     Filed:  06/13/18 04:14 PM Encounter Date:  6/13/2018 Status:  Signed     :  Paulette Badillo RN (Registered Nurse)            To Dr. Sanchez to review.   --Patient is requesting a refill of[MW1.1T] nabumetone and Fioricet[MW1.1M].  --Patient was last seen by pcp[MW1.1T] 1-23-18[MW1.1M].    --[MW1.1T]nabumetone[MW1.1M] last filled[MW1.1T] 9-26-16[MW1.1M] with[MW1.1T] TWO[MW1.1M] refills, so is due[MW1.1T] now  --Fioricet last filled 4-26-18 with NO refills, so is due now[MW1.1M] .     (If last refill had 30 days in the month = same 'start date' for this month's refill.     If last refill had 31 days in the month = start date is one day ealier for this   month's refill)    --The '90 Day Dispensed History' has been refreshed in patient's chart--there are no other providers listed prescribing narcotics.    --Med(s) pended with correct start date.[MW1.1T]           Revision History        User Key Date/Time User Provider Type Action    > MW1.1 06/13/18 04:14 PM Paulette Badillo RN Registered Nurse Sign    M - Manual, T - Template

## 2023-05-26 NOTE — ED NOTES
Eyes open briefly, currently is resting w/ eyes closed, rise/fall of chest noted. DVC maintained for safety. Awaiting transfer.

## 2023-05-26 NOTE — ED NOTES
Pt care assumed. Report received by AMY Muñoz. Pt lying in stretcher in low and locked position and side rails raised x2. Pt in NAD and verbalized no needs at this time. 1:1 sitter at bedside

## 2023-05-26 NOTE — ED NOTES
The patient is escorted to [employer] vehicle via w/c by this nurse & hospital security officers. She departed w/ 2 boxes containing ostomy supplies & shoes. She remained guarded yet pleasant & cooperative. No complaints or concerns voiced.

## 2023-05-26 NOTE — ED NOTES
Provided a cup of ice & a serving of Apple Juice. She denies any needs @ this time. Awaiting transfer.

## 2023-05-26 NOTE — ED NOTES
Skye w/ Krystal ARREGUIN of the transfer center & states updated ETA is 1.5 hours for transfer. DVC maintained for safety.

## 2023-05-26 NOTE — ED NOTES
Awake in bed. She continues to endorse SI w/ a plan to shoot herself. She states that her  owns guns. Supportive environment maintained for safety. DVC maintained for safety.

## 2023-05-26 NOTE — ED NOTES
Awake for VS assessment & returned to resting position w/ eyes closed, NAD. DVC maintained for safety.

## 2023-05-30 ENCOUNTER — HOSPITAL ENCOUNTER (INPATIENT)
Facility: HOSPITAL | Age: 58
LOS: 13 days | Discharge: HOME | DRG: 699 | End: 2023-06-12
Attending: STUDENT IN AN ORGANIZED HEALTH CARE EDUCATION/TRAINING PROGRAM | Admitting: HOSPITALIST
Payer: MEDICAID

## 2023-05-30 DIAGNOSIS — F32.A DEPRESSION, UNSPECIFIED DEPRESSION TYPE: Chronic | ICD-10-CM

## 2023-05-30 DIAGNOSIS — J43.9 PULMONARY EMPHYSEMA, UNSPECIFIED EMPHYSEMA TYPE: ICD-10-CM

## 2023-05-30 DIAGNOSIS — Z93.2 ILEOSTOMY IN PLACE: Chronic | ICD-10-CM

## 2023-05-30 DIAGNOSIS — N30.00 ACUTE CYSTITIS WITHOUT HEMATURIA: ICD-10-CM

## 2023-05-30 DIAGNOSIS — F33.1 MAJOR DEPRESSIVE DISORDER, RECURRENT EPISODE, MODERATE: ICD-10-CM

## 2023-05-30 DIAGNOSIS — N12 PYELONEPHRITIS: ICD-10-CM

## 2023-05-30 DIAGNOSIS — R11.2 NAUSEA & VOMITING: ICD-10-CM

## 2023-05-30 DIAGNOSIS — N39.0 UTI (URINARY TRACT INFECTION): Primary | ICD-10-CM

## 2023-05-30 DIAGNOSIS — Z93.6 NEPHROSTOMY STATUS: Chronic | ICD-10-CM

## 2023-05-30 DIAGNOSIS — N17.9 AKI (ACUTE KIDNEY INJURY): ICD-10-CM

## 2023-05-30 DIAGNOSIS — R11.14 BILIOUS VOMITING WITH NAUSEA: ICD-10-CM

## 2023-05-30 DIAGNOSIS — F43.10 PTSD (POST-TRAUMATIC STRESS DISORDER): Chronic | ICD-10-CM

## 2023-05-30 DIAGNOSIS — N13.30 HYDRONEPHROSIS, UNSPECIFIED HYDRONEPHROSIS TYPE: ICD-10-CM

## 2023-05-30 DIAGNOSIS — N18.31 STAGE 3A CHRONIC KIDNEY DISEASE: ICD-10-CM

## 2023-05-30 LAB
ALBUMIN SERPL BCP-MCNC: 3.3 G/DL (ref 3.5–5.2)
ALLENS TEST: NORMAL
ALP SERPL-CCNC: 156 U/L (ref 55–135)
ALT SERPL W/O P-5'-P-CCNC: 15 U/L (ref 10–44)
ANION GAP SERPL CALC-SCNC: 9 MMOL/L (ref 8–16)
AST SERPL-CCNC: 15 U/L (ref 10–40)
BACTERIA #/AREA URNS AUTO: ABNORMAL /HPF
BASOPHILS # BLD AUTO: 0.04 K/UL (ref 0–0.2)
BASOPHILS NFR BLD: 0.3 % (ref 0–1.9)
BILIRUB SERPL-MCNC: 0.2 MG/DL (ref 0.1–1)
BILIRUB UR QL STRIP: NEGATIVE
BUN SERPL-MCNC: 31 MG/DL (ref 6–20)
CALCIUM SERPL-MCNC: 10 MG/DL (ref 8.7–10.5)
CHLORIDE SERPL-SCNC: 113 MMOL/L (ref 95–110)
CLARITY UR REFRACT.AUTO: ABNORMAL
CO2 SERPL-SCNC: 17 MMOL/L (ref 23–29)
COLOR UR AUTO: YELLOW
CREAT SERPL-MCNC: 1.7 MG/DL (ref 0.5–1.4)
DELSYS: NORMAL
DIFFERENTIAL METHOD: ABNORMAL
EOSINOPHIL # BLD AUTO: 0.1 K/UL (ref 0–0.5)
EOSINOPHIL NFR BLD: 1 % (ref 0–8)
ERYTHROCYTE [DISTWIDTH] IN BLOOD BY AUTOMATED COUNT: 15.9 % (ref 11.5–14.5)
EST. GFR  (NO RACE VARIABLE): 34.1 ML/MIN/1.73 M^2
FIO2: 21
GLUCOSE SERPL-MCNC: 92 MG/DL (ref 70–110)
GLUCOSE UR QL STRIP: NEGATIVE
HCT VFR BLD AUTO: 40.2 % (ref 37–48.5)
HGB BLD-MCNC: 12.3 G/DL (ref 12–16)
HGB UR QL STRIP: NEGATIVE
HYALINE CASTS UR QL AUTO: 4 /LPF
IMM GRANULOCYTES # BLD AUTO: 0.04 K/UL (ref 0–0.04)
IMM GRANULOCYTES NFR BLD AUTO: 0.3 % (ref 0–0.5)
KETONES UR QL STRIP: NEGATIVE
LDH SERPL L TO P-CCNC: 0.7 MMOL/L (ref 0.5–2.2)
LEUKOCYTE ESTERASE UR QL STRIP: ABNORMAL
LYMPHOCYTES # BLD AUTO: 1.4 K/UL (ref 1–4.8)
LYMPHOCYTES NFR BLD: 12 % (ref 18–48)
MCH RBC QN AUTO: 27.3 PG (ref 27–31)
MCHC RBC AUTO-ENTMCNC: 30.6 G/DL (ref 32–36)
MCV RBC AUTO: 89 FL (ref 82–98)
MICROSCOPIC COMMENT: ABNORMAL
MODE: NORMAL
MONOCYTES # BLD AUTO: 1.8 K/UL (ref 0.3–1)
MONOCYTES NFR BLD: 15.2 % (ref 4–15)
NEUTROPHILS # BLD AUTO: 8.3 K/UL (ref 1.8–7.7)
NEUTROPHILS NFR BLD: 71.2 % (ref 38–73)
NITRITE UR QL STRIP: POSITIVE
NRBC BLD-RTO: 0 /100 WBC
PH UR STRIP: 6 [PH] (ref 5–8)
PLATELET # BLD AUTO: 203 K/UL (ref 150–450)
PMV BLD AUTO: 10.8 FL (ref 9.2–12.9)
POTASSIUM SERPL-SCNC: 4.4 MMOL/L (ref 3.5–5.1)
PROT SERPL-MCNC: 8.1 G/DL (ref 6–8.4)
PROT UR QL STRIP: ABNORMAL
RBC # BLD AUTO: 4.51 M/UL (ref 4–5.4)
RBC #/AREA URNS AUTO: 7 /HPF (ref 0–4)
SAMPLE: NORMAL
SARS-COV-2 RDRP RESP QL NAA+PROBE: NEGATIVE
SITE: NORMAL
SODIUM SERPL-SCNC: 139 MMOL/L (ref 136–145)
SP GR UR STRIP: 1.01 (ref 1–1.03)
URN SPEC COLLECT METH UR: ABNORMAL
WBC # BLD AUTO: 11.68 K/UL (ref 3.9–12.7)
WBC #/AREA URNS AUTO: >100 /HPF (ref 0–5)
YEAST UR QL AUTO: ABNORMAL

## 2023-05-30 PROCEDURE — 87086 URINE CULTURE/COLONY COUNT: CPT

## 2023-05-30 PROCEDURE — 96365 THER/PROPH/DIAG IV INF INIT: CPT

## 2023-05-30 PROCEDURE — 80053 COMPREHEN METABOLIC PANEL: CPT

## 2023-05-30 PROCEDURE — 87088 URINE BACTERIA CULTURE: CPT

## 2023-05-30 PROCEDURE — 99285 PR EMERGENCY DEPT VISIT,LEVEL V: ICD-10-PCS | Mod: ,,,

## 2023-05-30 PROCEDURE — 85025 COMPLETE CBC W/AUTO DIFF WBC: CPT

## 2023-05-30 PROCEDURE — 99232 PR SUBSEQUENT HOSPITAL CARE,LEVL II: ICD-10-PCS | Mod: 95,,, | Performed by: STUDENT IN AN ORGANIZED HEALTH CARE EDUCATION/TRAINING PROGRAM

## 2023-05-30 PROCEDURE — 99285 EMERGENCY DEPT VISIT HI MDM: CPT

## 2023-05-30 PROCEDURE — 83605 ASSAY OF LACTIC ACID: CPT

## 2023-05-30 PROCEDURE — 25000003 PHARM REV CODE 250

## 2023-05-30 PROCEDURE — 87077 CULTURE AEROBIC IDENTIFY: CPT

## 2023-05-30 PROCEDURE — 96375 TX/PRO/DX INJ NEW DRUG ADDON: CPT

## 2023-05-30 PROCEDURE — 99900035 HC TECH TIME PER 15 MIN (STAT)

## 2023-05-30 PROCEDURE — 93010 EKG 12-LEAD: ICD-10-PCS | Mod: ,,, | Performed by: INTERNAL MEDICINE

## 2023-05-30 PROCEDURE — 87186 SC STD MICRODIL/AGAR DIL: CPT

## 2023-05-30 PROCEDURE — 81001 URINALYSIS AUTO W/SCOPE: CPT

## 2023-05-30 PROCEDURE — U0002 COVID-19 LAB TEST NON-CDC: HCPCS

## 2023-05-30 PROCEDURE — 12000002 HC ACUTE/MED SURGE SEMI-PRIVATE ROOM

## 2023-05-30 PROCEDURE — 93005 ELECTROCARDIOGRAM TRACING: CPT

## 2023-05-30 PROCEDURE — 99285 EMERGENCY DEPT VISIT HI MDM: CPT | Mod: ,,,

## 2023-05-30 PROCEDURE — 93010 ELECTROCARDIOGRAM REPORT: CPT | Mod: ,,, | Performed by: INTERNAL MEDICINE

## 2023-05-30 PROCEDURE — G0378 HOSPITAL OBSERVATION PER HR: HCPCS

## 2023-05-30 PROCEDURE — 99232 SBSQ HOSP IP/OBS MODERATE 35: CPT | Mod: 95,,, | Performed by: STUDENT IN AN ORGANIZED HEALTH CARE EDUCATION/TRAINING PROGRAM

## 2023-05-30 PROCEDURE — 63600175 PHARM REV CODE 636 W HCPCS

## 2023-05-30 PROCEDURE — 87040 BLOOD CULTURE FOR BACTERIA: CPT

## 2023-05-30 PROCEDURE — 96361 HYDRATE IV INFUSION ADD-ON: CPT

## 2023-05-30 PROCEDURE — 94761 N-INVAS EAR/PLS OXIMETRY MLT: CPT

## 2023-05-30 RX ORDER — ENOXAPARIN SODIUM 100 MG/ML
40 INJECTION SUBCUTANEOUS EVERY 24 HOURS
Status: DISCONTINUED | OUTPATIENT
Start: 2023-05-31 | End: 2023-06-07

## 2023-05-30 RX ORDER — ACETAMINOPHEN 500 MG
500 TABLET ORAL EVERY 4 HOURS PRN
Status: DISCONTINUED | OUTPATIENT
Start: 2023-05-30 | End: 2023-05-31

## 2023-05-30 RX ORDER — GABAPENTIN 100 MG/1
200 CAPSULE ORAL NIGHTLY
Status: DISCONTINUED | OUTPATIENT
Start: 2023-05-31 | End: 2023-06-13 | Stop reason: HOSPADM

## 2023-05-30 RX ORDER — TAMSULOSIN HYDROCHLORIDE 0.4 MG/1
0.4 CAPSULE ORAL DAILY
Status: DISCONTINUED | OUTPATIENT
Start: 2023-05-31 | End: 2023-06-13 | Stop reason: HOSPADM

## 2023-05-30 RX ORDER — SODIUM CHLORIDE 0.9 % (FLUSH) 0.9 %
10 SYRINGE (ML) INJECTION
Status: DISCONTINUED | OUTPATIENT
Start: 2023-05-30 | End: 2023-06-13 | Stop reason: HOSPADM

## 2023-05-30 RX ORDER — ONDANSETRON 2 MG/ML
4 INJECTION INTRAMUSCULAR; INTRAVENOUS
Status: COMPLETED | OUTPATIENT
Start: 2023-05-30 | End: 2023-05-30

## 2023-05-30 RX ADMIN — SODIUM CHLORIDE, SODIUM LACTATE, POTASSIUM CHLORIDE, AND CALCIUM CHLORIDE 1000 ML: .6; .31; .03; .02 INJECTION, SOLUTION INTRAVENOUS at 06:05

## 2023-05-30 RX ADMIN — SODIUM CHLORIDE 1 G: 900 INJECTION INTRAVENOUS at 09:05

## 2023-05-30 RX ADMIN — ONDANSETRON 4 MG: 2 INJECTION INTRAMUSCULAR; INTRAVENOUS at 06:05

## 2023-05-30 NOTE — ED NOTES
Pt identifiers [facility] were checked and  are correct  LOC: The patient is awake, alert, aware of environment with an appropriate affect. Oriented x4, speaking appropriately  APPEARANCE: Pt resting comfortably, in no acute distress, pt is clean and well groomed, clothing properly fastened  SKIN: Skin warm, dry and intact, normal skin turgor, moist mucus membranes  RESPIRATORY: Airway is open and patent, respirations are spontaneous, even and unlabored, normal effort and rate  CARDIAC: Normal rate and rhythm, no peripheral edema noted, capillary refill < 3 seconds, bilateral radial pulses 2+  ABDOMEN: Soft, nontender, nondistended. Bowel sounds present   Pt has urostomy with pink stoma with  urostomy bag to left side of abd draining yellow colored urine  and colostomy with pink stoma colostomy bag to right side of abd draining dark green colored drainage and left nephrostomy tube with yellow colored urine  NEUROLOGIC: PERRL, facial expression is symmetrical, patient moving all extremities spontaneously, normal sensation in all extremities when touched with a finger.  Follows all commands appropriately  MUSCULOSKELETAL: No obvious deformities.

## 2023-05-30 NOTE — ED PROVIDER NOTES
Encounter Date: 5/30/2023       History     Chief Complaint   Patient presents with    Cystitis     Pt w/ hx of recurrent UTIs reports from [facility], under formal voluntary admission, for worsening RFTs and UTI. Ileostomy, urostomy, and nephrostomy tubes present.      58-year-old female with extensive medical history involving anxiety, anemia, DVT, CKD, HX of cervical cancer with radiation, cystitis, bilateral ureter obstruction (with urostomy), hemicolectomy (with ileostomy) and nephrostomy tubes presents to the emergency department from [facility] facility (due to suicidal ideations) for continued concerns of failure of outpatient antibiotics for urinary tract infection. Pt treated twice this month for UTI most recently on 05/11 with KLEBSIELLA on bactrim. However at facility pt most recent UA was still positive for leukocytes. Pt endorses she has been feeling unwell over the last two days with nausea and vomiting. Pt currently still endorses SI, and will remain under PEC until transferred back to behavioral health facility.     Review of patient's allergies indicates:   Allergen Reactions    Bee sting [allergen ext-venom-honey bee]      Rash      Grass pollen-bermuda, standard      rash     Past Medical History:   Diagnosis Date    Abnormal mammogram 08/25/2020    Acute blood loss anemia     Acute deep vein thrombosis (DVT) of lower extremity 12/09/2020    Advance care planning 04/30/2021    Anemia due to chronic blood loss     Anemia due to chronic kidney disease     Anxiety     Bilateral ureteral obstruction 9/11/2020    Cardiovascular event risk -- low 09/14/2015    ASCVD 10-year risk 1.9% (with optimal risk factors 1.3%) as of 9/14/2015     Cervical cancer 2014    Chronic back pain     Colostomy care     Deep vein thrombosis     Depression     Diarrhea due to malabsorption 11/14/2018    Difficult intubation     Disorder of kidney and ureter     DVT of lower extremity, bilateral 11/04/2020     Emphysema of lung 4/10/2023    Fibromyalgia     Fungemia 09/27/2020    Generalized abdominal pain 08/25/2020    GERD (gastroesophageal reflux disease)     Hemifacial spasm 09/16/2015    Hiatal hernia 2014    History of cervical cancer 10/11/2018    Hx of psychiatric care     Cymbalta, trazodone    Hypertension     Hypomagnesemia 11/21/2018    Lactose intolerance     Metastatic squamous cell carcinoma to lymph node 10/11/2018    Neuropathy due to chemotherapeutic drug 11/14/2018    Osteoarthritis of back     Peritonitis 09/22/2020    Pseudomonas urinary tract infection 04/21/2021    Psychiatric problem     Refusal of blood transfusions as patient is Hinduism     Schatzki's ring 09/14/2015    Seen on outside EGD 05/2014, underwent esophageal dilatation. Bx were negative.     Seizures     Sleep stage dysfunction     Noted on PSG 06/2017; negative for obstructive sleep apnea     Stroke     Urinary tract infection associated with nephrostomy catheter 06/11/2020    Wound infection after surgery 09/24/2020     Past Surgical History:   Procedure Laterality Date    ANTEGRADE NEPHROSTOGRAPHY Bilateral 9/28/2020    Procedure: Nephrostogram - antegrade;  Surgeon: Leslie Balbuena MD;  Location: [facility] OR [address] [place] [address];  Service: Urology;  Laterality: Bilateral;    ANTEGRADE NEPHROSTOGRAPHY Left 4/20/2021    Procedure: NEPHROSTOGRAM, ANTEGRADE;  Surgeon: Leslie Balbuena MD;  [facility];  Service: Urology;  Laterality: Left;    ANTEGRADE NEPHROSTOGRAPHY Right 10/27/2022    Procedure: NEPHROSTOGRAM, ANTEGRADE;  Surgeon: Chirag Russ MD;  [facility];  Service: Urology;  Laterality: Right;    ANTEGRADE NEPHROSTOGRAPHY Right 4/21/2023    Procedure: NEPHROSTOGRAM, ANTEGRADE;  Surgeon: Chirag Russ MD;  Location: [facility] OR 2ND FLR;  Service: Urology;  Laterality: Right;    BILATERAL OOPHORECTOMY  2015    CHOLECYSTECTOMY  11/09/2016    Done at [facility], path showed chronic cholecystitis and  gallstones    cold knife conization  2014    COLECTOMY, RIGHT  9/17/2020    Procedure: COLECTOMY, RIGHT Extended;  Surgeon: Hammad Reynolds MD;  Location: [facility] OR 2ND FLR;  Service: Colon and Rectal;;    COLONOSCOPY  2014    COLONOSCOPY N/A 10/24/2016    at [facility], Dr Gutiérrez    COLONOSCOPY N/A 5/18/2018    Procedure: COLONOSCOPY;  Surgeon: Arden Gutiérrez MD;  Location: [facility] ENDO (4TH FLR);  Service: Endoscopy;  Laterality: N/A;    COLONOSCOPY N/A 7/28/2020    Procedure: COLONOSCOPY;  Surgeon: Hammad Reynolds MD;  Location: [facility] (4TH FLR);  Service: Colon and Rectal;  Laterality: N/A;  covid test [place] 7/25    CYSTOSCOPY WITH URETEROSCOPY, RETROGRADE PYELOGRAPHY, AND INSERTION OF STENT Bilateral 3/21/2020    Procedure: CYSTOSCOPY, WITH RETROGRADE PYELOGRAM,;  Surgeon: Leslie Balbuena MD;  Location: [facility] OR 1ST FLR;  Service: Urology;  Laterality: Bilateral;    DILATION OF NEPHROSTOMY TRACT Right 10/27/2022    Procedure: DILATION, NEPHROSTOMY TRACT;  Surgeon: Chirag Russ MD;  Location: [facility] OR 1ST FLR;  Service: Urology;  Laterality: Right;    ESOPHAGOGASTRODUODENOSCOPY  2014    ESOPHAGOGASTRODUODENOSCOPY  11/18/2020    ESOPHAGOGASTRODUODENOSCOPY N/A 11/18/2020    Procedure: ESOPHAGOGASTRODUODENOSCOPY (EGD);  Surgeon: Zenon Spencer MD;  Location: [facility];  Service: Endoscopy;  Laterality: N/A;    ESOPHAGOGASTRODUODENOSCOPY N/A 12/11/2020    Procedure: EGD (ESOPHAGOGASTRODUODENOSCOPY);  Surgeon: Juancho Muse MD;  Location: [facility] ENDO (2ND FLR);  Service: Endoscopy;  Laterality: N/A;    HYSTERECTOMY  2014    [facility] for cervical cancer    ILEOSTOMY  9/17/2020    Procedure: CREATION, ILEOSTOMY;  Surgeon: Hammad Reynolds MD;  Location: [facility] OR 2ND FLR;  Service: Colon and Rectal;;    LOOPOGRAM N/A 4/20/2021    Procedure: LOOPOGRAM;  Surgeon: Leslie Balbuena MD;  Location: [facility] OR 1ST FLR;  Service: Urology;  Laterality: N/A;    MOBILIZATION OF SPLENIC FLEXURE N/A 9/11/2020    Procedure:  MOBILIZATION, COLONIC;  Surgeon: Hammad Reynolds MD;  Location: [facility] OR 2ND FLR;  Service: Colon and Rectal;  Laterality: N/A;    NEPHROSTOGRAPHY Bilateral 4/17/2021    Procedure: Nephrostogram;  Surgeon: Celeste Surgeon;  Location: [facility];  Service: Anesthesiology;  Laterality: Bilateral;    OOPHORECTOMY      PERCUTANEOUS NEPHROLITHOTOMY Right 4/21/2023    Procedure: NEPHROLITHOTOMY, PERCUTANEOUS;  Surgeon: Chirag Russ MD;  Location: [facility] OR 2ND FLR;  Service: Urology;  Laterality: Right;  2.5 hrs    PERCUTANEOUS NEPHROSTOMY  4/21/2023    Procedure: CREATION, NEPHROSTOMY, PERCUTANEOUS with removal of existing nephrostomy tube;  Surgeon: Chirag Russ MD;  Location: [facility] OR [facility] FLR;  Service: Urology;;    PYELOSCOPY Right 10/27/2022    Procedure: PYELOSCOPY;  Surgeon: Chirag Russ MD;  [facility];  Service: Urology;  Laterality: Right;    REPLACEMENT OF NEPHROSTOMY TUBE Right 10/27/2022    Procedure: REPLACEMENT, NEPHROSTOMY TUBE;  Surgeon: Chirag Russ MD;  [facility];  Service: Urology;  Laterality: Right;    RETROPERITONEAL LYMPHADENECTOMY Right 9/17/2018    Procedure: LYMPHADENECTOMY, RETROPERITONEUM;  Surgeon: Sebas Reed MD;  Location: [facility] OR [facility] FLR;  Service: General;  Laterality: Right;  ILIAC    URETEROSCOPIC REMOVAL OF URETERIC CALCULUS Right 10/27/2022    Procedure: REMOVAL, CALCULUS, URETER, URETEROSCOPIC;  Surgeon: Chirag Russ MD;  [facility];  Service: Urology;  Laterality: Right;    URETEROSCOPY Right 10/27/2022    Procedure: URETEROSCOPY-ANTEGRADE;  Surgeon: Chirag Russ MD;  Location: [facility] OR [facility] FLR;  Service: Urology;  Laterality: Right;     Family History   Problem Relation Age of Onset    Heart disease Sister     Heart disease Maternal Grandmother     Colon cancer Father     Esophageal cancer Father     Cancer Father         Lung-smoker    Cancer Mother         Cervical    Cervical cancer Mother     Breast  cancer Maternal Aunt     Suicide Daughter         jumped from parking structure    Drug abuse Daughter     Drug abuse Daughter     Rectal cancer Neg Hx     Stomach cancer Neg Hx     Crohn's disease Neg Hx     Ulcerative colitis Neg Hx     Diabetes Neg Hx     Hypertension Neg Hx      Social History     Tobacco Use    Smoking status: Never    Smokeless tobacco: Never   Substance Use Topics    Alcohol use: No     Alcohol/week: 0.0 standard drinks    Drug use: No     Review of Systems   Constitutional:  Positive for appetite change.   Gastrointestinal:  Positive for nausea and vomiting. Negative for abdominal pain.   Skin:  Negative for color change.   Allergic/Immunologic: Negative for immunocompromised state.   Neurological:  Positive for weakness.   Psychiatric/Behavioral:  Positive for suicidal ideas.      Physical Exam     Initial Vitals [05/30/23 1541]   BP Pulse Resp Temp SpO2   109/61 100 18 98.1 °F (36.7 °C) 100 %      MAP       --         Physical Exam    Nursing note and vitals reviewed.  Constitutional: She is not diaphoretic. No distress.   HENT:   Head: Normocephalic and atraumatic.   Eyes: Conjunctivae and EOM are normal.   Neck:   Normal range of motion.  Cardiovascular:  Normal rate and normal heart sounds.           Pulmonary/Chest: No respiratory distress.   Abdominal: Abdomen is soft.   Illeostomy, urostomy in place.   No induration surrounding stoma, no abnormal drainage   Musculoskeletal:         General: Normal range of motion.      Cervical back: Normal range of motion.     Neurological: She is alert and oriented to person, place, and time.   Skin: Skin is warm and dry.   Psychiatric: She has a normal mood and affect. Her speech is normal and behavior is normal. Judgment and thought content normal. Cognition and memory are normal.   PT still reveals feeling depressed with SI due to the death of daughter recently.        ED Course   Procedures  Labs Reviewed   CBC W/ AUTO DIFFERENTIAL - Abnormal;  Notable for the following components:       Result Value    MCHC 30.6 (*)     RDW 15.9 (*)     Gran # (ANC) 8.3 (*)     Mono # 1.8 (*)     Lymph % 12.0 (*)     Mono % 15.2 (*)     All other components within normal limits   COMPREHENSIVE METABOLIC PANEL - Abnormal; Notable for the following components:    Chloride 113 (*)     CO2 17 (*)     BUN 31 (*)     Creatinine 1.7 (*)     Albumin 3.3 (*)     Alkaline Phosphatase 156 (*)     eGFR 34.1 (*)     All other components within normal limits   URINALYSIS, REFLEX TO URINE CULTURE - Abnormal; Notable for the following components:    Appearance, UA Hazy (*)     Protein, UA 1+ (*)     Nitrite, UA Positive (*)     Leukocytes, UA 3+ (*)     All other components within normal limits    Narrative:     Specimen Source->Urine   URINALYSIS MICROSCOPIC - Abnormal; Notable for the following components:    RBC, UA 7 (*)     WBC, UA >100 (*)     Bacteria Many (*)     Yeast, UA Few (*)     Hyaline Casts, UA 4 (*)     All other components within normal limits    Narrative:     Specimen Source->Urine   CULTURE, BLOOD   CULTURE, BLOOD   CULTURE, URINE   SARS-COV-2 RNA AMPLIFICATION, QUAL   URINALYSIS, REFLEX TO URINE CULTURE   ISTAT LACTATE          Imaging Results    None          Medications   meropenem (MERREM) 1 g in sodium chloride 0.9 % 100 mL IVPB (MB+) (1 g Intravenous New Bag 5/30/23 2145)   lactated ringers bolus 1,000 mL (1,000 mLs Intravenous New Bag 5/30/23 1830)   ondansetron injection 4 mg (4 mg Intravenous Given 5/30/23 1830)     Medical Decision Making:   Differential Diagnosis:   Pyelonephritis, Bacteremia, need for IV abx  Clinical Tests:   Lab Tests: Ordered  ED Management:  57 y/o female with multiple co morbidities presents due to worsening presentation despite outpatient abx (Bactrim) for UTI. No leukocytosis and lactate is normal. Initiated fluid, antiemetics and abx.   UA still pending at time discharge, pt will likely be admitted to  for continued ABX and  Bcultures results.   She will be transported back to [facility] after medication intervention completed.  Pt discussed with supervising physician                          Clinical Impression:   Final diagnoses:  [R11.2] Nausea & vomiting  [N17.9] ODESSA (acute kidney injury) (Primary)  [N12] Pyelonephritis        ED Disposition Condition    Observation                 Ghada Nix PA-C  05/30/23 [id number]

## 2023-05-30 NOTE — ED TRIAGE NOTES
Pt arrived to ED via EMS from [facility] in [place], [place]   and pt was in room upon nurse arrival to room  Pt states the medication she was receiving at [facility] was not helping her UTI  Pt was at [facility] with SI after her daughter committed suicide on 5/11/2023

## 2023-05-31 LAB
ANION GAP SERPL CALC-SCNC: 10 MMOL/L (ref 8–16)
BASOPHILS # BLD AUTO: 0.03 K/UL (ref 0–0.2)
BASOPHILS NFR BLD: 0.3 % (ref 0–1.9)
BUN SERPL-MCNC: 24 MG/DL (ref 6–20)
CALCIUM SERPL-MCNC: 9.4 MG/DL (ref 8.7–10.5)
CHLORIDE SERPL-SCNC: 114 MMOL/L (ref 95–110)
CO2 SERPL-SCNC: 18 MMOL/L (ref 23–29)
CREAT SERPL-MCNC: 1.4 MG/DL (ref 0.5–1.4)
DIFFERENTIAL METHOD: ABNORMAL
EOSINOPHIL # BLD AUTO: 0.1 K/UL (ref 0–0.5)
EOSINOPHIL NFR BLD: 0.8 % (ref 0–8)
ERYTHROCYTE [DISTWIDTH] IN BLOOD BY AUTOMATED COUNT: 15.5 % (ref 11.5–14.5)
EST. GFR  (NO RACE VARIABLE): 43.1 ML/MIN/1.73 M^2
GLUCOSE SERPL-MCNC: 91 MG/DL (ref 70–110)
HCT VFR BLD AUTO: 37.3 % (ref 37–48.5)
HGB BLD-MCNC: 11.3 G/DL (ref 12–16)
IMM GRANULOCYTES # BLD AUTO: 0.11 K/UL (ref 0–0.04)
IMM GRANULOCYTES NFR BLD AUTO: 1.1 % (ref 0–0.5)
LYMPHOCYTES # BLD AUTO: 1.2 K/UL (ref 1–4.8)
LYMPHOCYTES NFR BLD: 11.8 % (ref 18–48)
MCH RBC QN AUTO: 27.6 PG (ref 27–31)
MCHC RBC AUTO-ENTMCNC: 30.3 G/DL (ref 32–36)
MCV RBC AUTO: 91 FL (ref 82–98)
MONOCYTES # BLD AUTO: 1.6 K/UL (ref 0.3–1)
MONOCYTES NFR BLD: 15.2 % (ref 4–15)
NEUTROPHILS # BLD AUTO: 7.3 K/UL (ref 1.8–7.7)
NEUTROPHILS NFR BLD: 70.8 % (ref 38–73)
NRBC BLD-RTO: 0 /100 WBC
PLATELET # BLD AUTO: 240 K/UL (ref 150–450)
PMV BLD AUTO: 9.7 FL (ref 9.2–12.9)
POTASSIUM SERPL-SCNC: 3.8 MMOL/L (ref 3.5–5.1)
RBC # BLD AUTO: 4.1 M/UL (ref 4–5.4)
SODIUM SERPL-SCNC: 142 MMOL/L (ref 136–145)
WBC # BLD AUTO: 10.32 K/UL (ref 3.9–12.7)

## 2023-05-31 PROCEDURE — 11000001 HC ACUTE MED/SURG PRIVATE ROOM

## 2023-05-31 PROCEDURE — 99213 PR OFFICE/OUTPT VISIT, EST, LEVL III, 20-29 MIN: ICD-10-PCS | Mod: ,,, | Performed by: INTERNAL MEDICINE

## 2023-05-31 PROCEDURE — 36415 COLL VENOUS BLD VENIPUNCTURE: CPT | Performed by: STUDENT IN AN ORGANIZED HEALTH CARE EDUCATION/TRAINING PROGRAM

## 2023-05-31 PROCEDURE — G0378 HOSPITAL OBSERVATION PER HR: HCPCS

## 2023-05-31 PROCEDURE — 99213 OFFICE O/P EST LOW 20 MIN: CPT | Mod: ,,, | Performed by: INTERNAL MEDICINE

## 2023-05-31 PROCEDURE — 85025 COMPLETE CBC W/AUTO DIFF WBC: CPT | Performed by: STUDENT IN AN ORGANIZED HEALTH CARE EDUCATION/TRAINING PROGRAM

## 2023-05-31 PROCEDURE — 99232 SBSQ HOSP IP/OBS MODERATE 35: CPT | Mod: ,,, | Performed by: INTERNAL MEDICINE

## 2023-05-31 PROCEDURE — 99232 PR SUBSEQUENT HOSPITAL CARE,LEVL II: ICD-10-PCS | Mod: ,,, | Performed by: INTERNAL MEDICINE

## 2023-05-31 PROCEDURE — 96372 THER/PROPH/DIAG INJ SC/IM: CPT | Performed by: STUDENT IN AN ORGANIZED HEALTH CARE EDUCATION/TRAINING PROGRAM

## 2023-05-31 PROCEDURE — 63600175 PHARM REV CODE 636 W HCPCS: Performed by: STUDENT IN AN ORGANIZED HEALTH CARE EDUCATION/TRAINING PROGRAM

## 2023-05-31 PROCEDURE — 80048 BASIC METABOLIC PNL TOTAL CA: CPT | Performed by: STUDENT IN AN ORGANIZED HEALTH CARE EDUCATION/TRAINING PROGRAM

## 2023-05-31 PROCEDURE — 25000003 PHARM REV CODE 250: Performed by: STUDENT IN AN ORGANIZED HEALTH CARE EDUCATION/TRAINING PROGRAM

## 2023-05-31 RX ORDER — RISPERIDONE 0.5 MG/1
0.5 TABLET ORAL 2 TIMES DAILY
Status: ON HOLD | COMMUNITY
End: 2023-06-03 | Stop reason: HOSPADM

## 2023-05-31 RX ORDER — MORPHINE SULFATE 2 MG/ML
2 INJECTION, SOLUTION INTRAMUSCULAR; INTRAVENOUS EVERY 6 HOURS PRN
Status: DISCONTINUED | OUTPATIENT
Start: 2023-05-31 | End: 2023-06-05

## 2023-05-31 RX ORDER — ACETAMINOPHEN 325 MG/1
325 TABLET ORAL EVERY 8 HOURS PRN
Status: DISCONTINUED | OUTPATIENT
Start: 2023-05-31 | End: 2023-06-13 | Stop reason: HOSPADM

## 2023-05-31 RX ORDER — OXYCODONE AND ACETAMINOPHEN 10; 325 MG/1; MG/1
1 TABLET ORAL EVERY 4 HOURS PRN
Status: DISCONTINUED | OUTPATIENT
Start: 2023-05-31 | End: 2023-06-02

## 2023-05-31 RX ORDER — TRAZODONE HYDROCHLORIDE 100 MG/1
100 TABLET ORAL NIGHTLY
Status: ON HOLD | COMMUNITY
End: 2023-06-03 | Stop reason: HOSPADM

## 2023-05-31 RX ORDER — TOLTERODINE 2 MG/1
2 CAPSULE, EXTENDED RELEASE ORAL DAILY
Status: ON HOLD | COMMUNITY
End: 2023-11-05

## 2023-05-31 RX ORDER — OXYCODONE AND ACETAMINOPHEN 5; 325 MG/1; MG/1
1 TABLET ORAL EVERY 4 HOURS PRN
Status: DISCONTINUED | OUTPATIENT
Start: 2023-05-31 | End: 2023-06-02

## 2023-05-31 RX ADMIN — MEROPENEM 2 G: 1 INJECTION INTRAVENOUS at 11:05

## 2023-05-31 RX ADMIN — MEROPENEM 2 G: 1 INJECTION INTRAVENOUS at 08:05

## 2023-05-31 RX ADMIN — ENOXAPARIN SODIUM 40 MG: 40 INJECTION SUBCUTANEOUS at 05:05

## 2023-05-31 RX ADMIN — TAMSULOSIN HYDROCHLORIDE 0.4 MG: 0.4 CAPSULE ORAL at 11:05

## 2023-05-31 RX ADMIN — GABAPENTIN 200 MG: 100 CAPSULE ORAL at 08:05

## 2023-05-31 NOTE — NURSING
Nurses Note -- 4 Eyes      5/31/2023   4:44 AM      Skin assessed during: Admit      [x] No Altered Skin Integrity Present    [x]Prevention Measures Documented      [] Yes- Altered Skin Integrity Present or Discovered   [] LDA Added if Not in Epic (Describe Wound)   [] New Altered Skin Integrity was Present on Admit and Documented in LDA   [] Wound Image Taken    Wound Care Consulted? No    Attending Nurse:  Lindy Palacios RN     Second RN/Staff Member:  Marco A De Anda RN

## 2023-05-31 NOTE — ED PROVIDER NOTES
I assumed care of this patient from the outgoing physician assistant at shift change.  The patient has a persistent UTI, not responsive to oral antibiotics at home.  ODESSA noted, nitrite positive urinalysis.  Treated with meropenem in the ED based on previous culture.  Placed in observation with Hospital Medicine.  Patient also with voluntary admission to local psychiatric facility for suicidal ideations.  Pec ordered here.     Kem Correia PA-C  05/30/23 1692

## 2023-05-31 NOTE — SUBJECTIVE & OBJECTIVE
Past Medical History:   Diagnosis Date    Abnormal mammogram 08/25/2020    Acute blood loss anemia     Acute deep vein thrombosis (DVT) of lower extremity 12/09/2020    Advance care planning 04/30/2021    Anemia due to chronic blood loss     Anemia due to chronic kidney disease     Anxiety     Bilateral ureteral obstruction 9/11/2020    Cardiovascular event risk -- low 09/14/2015    ASCVD 10-year risk 1.9% (with optimal risk factors 1.3%) as of 9/14/2015     Cervical cancer 2014    Chronic back pain     Colostomy care     Deep vein thrombosis     Depression     Diarrhea due to malabsorption 11/14/2018    Difficult intubation     Disorder of kidney and ureter     DVT of lower extremity, bilateral 11/04/2020    Emphysema of lung 4/10/2023    Fibromyalgia     Fungemia 09/27/2020    Generalized abdominal pain 08/25/2020    GERD (gastroesophageal reflux disease)     Hemifacial spasm 09/16/2015    Hiatal hernia 2014    History of cervical cancer 10/11/2018    Hx of psychiatric care     Cymbalta, trazodone    Hypertension     Hypomagnesemia 11/21/2018    Lactose intolerance     Metastatic squamous cell carcinoma to lymph node 10/11/2018    Neuropathy due to chemotherapeutic drug 11/14/2018    Osteoarthritis of back     Peritonitis 09/22/2020    Pseudomonas urinary tract infection 04/21/2021    Psychiatric problem     Refusal of blood transfusions as patient is Latter-day     Schatzki's ring 09/14/2015    Seen on outside EGD 05/2014, underwent esophageal dilatation. Bx were negative.     Seizures     Sleep stage dysfunction     Noted on PSG 06/2017; negative for obstructive sleep apnea     Stroke     Urinary tract infection associated with nephrostomy catheter 06/11/2020    Wound infection after surgery 09/24/2020       Past Surgical History:   Procedure Laterality Date    ANTEGRADE NEPHROSTOGRAPHY Bilateral 9/28/2020    Procedure: Nephrostogram - antegrade;  Surgeon: Leslie Balbuena MD;  Location: [facility] OR [address] [place] [address];   Service: Urology;  Laterality: Bilateral;    ANTEGRADE NEPHROSTOGRAPHY Left 4/20/2021    Procedure: NEPHROSTOGRAM, ANTEGRADE;  Surgeon: Leslie Balbuena MD;  Location: [facility] OR 1ST FLR;  Service: Urology;  Laterality: Left;    ANTEGRADE NEPHROSTOGRAPHY Right 10/27/2022    Procedure: NEPHROSTOGRAM, ANTEGRADE;  Surgeon: Chirag Russ MD;  Location: [facility] OR 1ST FLR;  Service: Urology;  Laterality: Right;    ANTEGRADE NEPHROSTOGRAPHY Right 4/21/2023    Procedure: NEPHROSTOGRAM, ANTEGRADE;  Surgeon: Chirag Russ MD;  Location: [facility] OR 2ND FLR;  Service: Urology;  Laterality: Right;    BILATERAL OOPHORECTOMY  2015    CHOLECYSTECTOMY  11/09/2016    Done at [facility], path showed chronic cholecystitis and gallstones    cold knife conization  2014    COLECTOMY, RIGHT  9/17/2020    Procedure: COLECTOMY, RIGHT Extended;  Surgeon: Hammad Reynolds MD;  Location: [facility] OR 2ND FLR;  Service: Colon and Rectal;;    COLONOSCOPY  2014    COLONOSCOPY N/A 10/24/2016    at [facility], Dr Gutiérrez    COLONOSCOPY N/A 5/18/2018    Procedure: COLONOSCOPY;  Surgeon: Arden Gutiérrez MD;  Location: [facility] ENDO (4TH FLR);  Service: Endoscopy;  Laterality: N/A;    COLONOSCOPY N/A 7/28/2020    Procedure: COLONOSCOPY;  Surgeon: Hammad Reynolds MD;  Location: [facility] ENDO (4TH FLR);  Service: Colon and Rectal;  Laterality: N/A;  covid test [place] 7/25    CYSTOSCOPY WITH URETEROSCOPY, RETROGRADE PYELOGRAPHY, AND INSERTION OF STENT Bilateral 3/21/2020    Procedure: CYSTOSCOPY, WITH RETROGRADE PYELOGRAM,;  Surgeon: Leslie Balbuena MD;  Location: [facility] OR 1ST FLR;  Service: Urology;  Laterality: Bilateral;    DILATION OF NEPHROSTOMY TRACT Right 10/27/2022    Procedure: DILATION, NEPHROSTOMY TRACT;  Surgeon: Chirag Russ MD;  Location: [facility] OR 1ST FLR;  Service: Urology;  Laterality: Right;    ESOPHAGOGASTRODUODENOSCOPY  2014    ESOPHAGOGASTRODUODENOSCOPY  11/18/2020    ESOPHAGOGASTRODUODENOSCOPY N/A 11/18/2020    Procedure:  ESOPHAGOGASTRODUODENOSCOPY (EGD);  Surgeon: Zenon Spencer MD;  Location: [facility] ENDO;  Service: Endoscopy;  Laterality: N/A;    ESOPHAGOGASTRODUODENOSCOPY N/A 12/11/2020    Procedure: EGD (ESOPHAGOGASTRODUODENOSCOPY);  Surgeon: Juancho Muse MD;  Location: [facility] (2ND FLR);  Service: Endoscopy;  Laterality: N/A;    HYSTERECTOMY  2014    [facility] for cervical cancer    ILEOSTOMY  9/17/2020    Procedure: CREATION, ILEOSTOMY;  Surgeon: Hammad Reynolds MD;  Location: [facility] OR 2ND FLR;  Service: Colon and Rectal;;    LOOPOGRAM N/A 4/20/2021    Procedure: LOOPOGRAM;  Surgeon: Leslie Balbuena MD;  Location: [facility] OR 1ST FLR;  Service: Urology;  Laterality: N/A;    MOBILIZATION OF SPLENIC FLEXURE N/A 9/11/2020    Procedure: MOBILIZATION, COLONIC;  Surgeon: Hammad Reynolds MD;  Location: [facility] OR 2ND FLR;  Service: Colon and Rectal;  Laterality: N/A;    NEPHROSTOGRAPHY Bilateral 4/17/2021    Procedure: Nephrostogram;  Surgeon: Celeste Surgeon;  Location: [facility];  Service: Anesthesiology;  Laterality: Bilateral;    OOPHORECTOMY      PERCUTANEOUS NEPHROLITHOTOMY Right 4/21/2023    Procedure: NEPHROLITHOTOMY, PERCUTANEOUS;  Surgeon: Chirag Russ MD;  Location: [facility] OR 2ND FLR;  Service: Urology;  Laterality: Right;  2.5 hrs    PERCUTANEOUS NEPHROSTOMY  4/21/2023    Procedure: CREATION, NEPHROSTOMY, PERCUTANEOUS with removal of existing nephrostomy tube;  Surgeon: Chirag Russ MD;  Location: [facility] OR 2ND FLR;  Service: Urology;;    PYELOSCOPY Right 10/27/2022    Procedure: PYELOSCOPY;  Surgeon: Chirag Russ MD;  Location: [facility] OR 1ST FLR;  Service: Urology;  Laterality: Right;    REPLACEMENT OF NEPHROSTOMY TUBE Right 10/27/2022    Procedure: REPLACEMENT, NEPHROSTOMY TUBE;  Surgeon: Chirag Russ MD;  Location: [facility] OR 1ST FLR;  Service: Urology;  Laterality: Right;    RETROPERITONEAL LYMPHADENECTOMY Right 9/17/2018    Procedure: LYMPHADENECTOMY, RETROPERITONEUM;  Surgeon: Sebas Reed MD;   Location: [facility] OR 2ND FLR;  Service: General;  Laterality: Right;  ILIAC    URETEROSCOPIC REMOVAL OF URETERIC CALCULUS Right 10/27/2022    Procedure: REMOVAL, CALCULUS, URETER, URETEROSCOPIC;  Surgeon: Chirag Russ MD;  Location: [facility] OR 1ST FLR;  Service: Urology;  Laterality: Right;    URETEROSCOPY Right 10/27/2022    Procedure: URETEROSCOPY-ANTEGRADE;  Surgeon: Chirag Russ MD;  Location: [facility] OR [address] [place] [address];  Service: Urology;  Laterality: Right;       Review of patient's allergies indicates:   Allergen Reactions    Bee sting [allergen ext-venom-honey bee]      Rash      Grass pollen-bermuda, standard      rash       Medications:  Medications Prior to Admission   Medication Sig    acetaminophen (TYLENOL) 500 MG tablet Take 1 tablet (500 mg total) by mouth every 4 (four) hours as needed for Pain.    albuterol (VENTOLIN HFA) 90 mcg/actuation inhaler Inhale 2 puffs into the lungs every 6 (six) hours as needed for Wheezing or Shortness of Breath. Rescue    gabapentin (NEURONTIN) 100 MG capsule Take 2 capsules (200 mg total) by mouth every evening.    melatonin (MELATIN) 3 mg tablet Take 2 tablets (6 mg total) by mouth nightly as needed for Insomnia.    oxybutynin (DITROPAN-XL) 10 MG 24 hr tablet Take 1 tablet (10 mg total) by mouth once daily. For spasms associated with neprhostomy tubes    tamsulosin (FLOMAX) 0.4 mg Cap Take 1 capsule (0.4 mg total) by mouth once daily. For spasm associated with nephrostomy tubes    mirtazapine (REMERON SOL-TAB) 15 MG disintegrating tablet Dissolve 1 tablet (15 mg total) by mouth nightly.     Antibiotics (From admission, onward)      Start     Stop Route Frequency Ordered    05/31/23 0900  meropenem (MERREM) 2 g in sodium chloride 0.9% 100 mL IVPB         -- IV Every 12 hours (non-standard times) 05/30/23 2319          Antifungals (From admission, onward)      None          Antivirals (From admission, onward)      None             Immunization History   Administered Date(s)  Administered    Influenza - Quadrivalent - PF *Preferred* (6 months and older) 2017, 2019    Tdap 2017       Family History       Problem Relation (Age of Onset)    Breast cancer Maternal Aunt    Cancer Father, Mother    Cervical cancer Mother    Colon cancer Father    Drug abuse Daughter, Daughter    Esophageal cancer Father    Heart disease Sister, Maternal Grandmother    Suicide Daughter          Social History     Socioeconomic History    Marital status:      Spouse name: Hammad    Number of children: 2   Tobacco Use    Smoking status: Never    Smokeless tobacco: Never   Substance and Sexual Activity    Alcohol use: No     Alcohol/week: 0.0 standard drinks    Drug use: No    Sexual activity: Yes     Partners: Male     Birth control/protection: None     Comment:  19 years since    Social History Narrative    , twin daughters (1  2018), disabled due to childhood stroke, Hinduism sophia     Social Determinants of Health     Financial Resource Strain: Low Risk     Difficulty of Paying Living Expenses: Not very hard   Food Insecurity: No Food Insecurity    Worried About Running Out of Food in the Last Year: Never true    Ran Out of Food in the Last Year: Never true   Transportation Needs: No Transportation Needs    Lack of Transportation (Medical): No    Lack of Transportation (Non-Medical): No   Physical Activity: Inactive    Days of Exercise per Week: 0 days    Minutes of Exercise per Session: 0 min   Stress: No Stress Concern Present    Feeling of Stress : Only a little   Social Connections: Moderately Isolated    Frequency of Communication with Friends and Family: More than three times a week    Frequency of Social Gatherings with Friends and Family: More than three times a week    Attends Yarsanism Services: Never    Active Member of Clubs or Organizations: No    Attends Club or Organization Meetings: Never    Marital Status:    Housing  Stability: Low Risk     Unable to Pay for Housing in the Last Year: No    Number of Places Lived in the Last Year: 1    Unstable Housing in the Last Year: No     Review of Systems   Unable to perform ROS: Psychiatric disorder   Genitourinary:  Positive for flank pain. Negative for hematuria.   Objective:     Vital Signs (Most Recent):  Temp: 98.2 °F (36.8 °C) (05/31/23 0729)  Pulse: 87 (05/31/23 0729)  Resp: 16 (05/31/23 0729)  BP: (!) 111/58 (05/31/23 0729)  SpO2: 95 % (05/31/23 0729) Vital Signs (24h Range):  Temp:  [97.7 °F (36.5 °C)-98.2 °F (36.8 °C)] 98.2 °F (36.8 °C)  Pulse:  [] 87  Resp:  [16-20] 16  SpO2:  [95 %-100 %] 95 %  BP: (104-117)/(55-61) 111/58     Weight: 83 kg (183 lb)  Body mass index is 27.02 kg/m².    Estimated Creatinine Clearance: 49.2 mL/min (based on SCr of 1.4 mg/dL).     Physical Exam  Vitals and nursing note reviewed.   Constitutional:       General: She is not in acute distress.     Appearance: She is well-developed. She is not diaphoretic.   HENT:      Head: Normocephalic and atraumatic.   Eyes:      Pupils: Pupils are equal, round, and reactive to light.   Cardiovascular:      Rate and Rhythm: Normal rate and regular rhythm.      Heart sounds: Normal heart sounds. No murmur heard.    No friction rub. No gallop.   Pulmonary:      Effort: Pulmonary effort is normal. No respiratory distress.      Breath sounds: Normal breath sounds. No wheezing or rales.   Chest:      Chest wall: No tenderness.   Abdominal:      General: Bowel sounds are normal. There is no distension.      Palpations: Abdomen is soft. There is no mass.      Tenderness: There is no abdominal tenderness. There is no guarding or rebound.      Hernia: No hernia is present.      Comments: Ileostomy site and conduit site c/d/I   Left nephrostomy tube stable    Musculoskeletal:         General: No tenderness or deformity. Normal range of motion.      Cervical back: Normal range of motion and neck supple.   Skin:      General: Skin is warm and dry.      Coloration: Skin is not pale.      Findings: No erythema.   Neurological:      Mental Status: She is alert and oriented to person, place, and time.      Cranial Nerves: No cranial nerve deficit.      Coordination: Coordination normal.   Psychiatric:         Behavior: Behavior normal.         Thought Content: Thought content normal.        Significant Labs: All pertinent labs within the past 24 hours have been reviewed.    Significant Imaging: I have reviewed all pertinent imaging results/findings within the past 24 hours.

## 2023-05-31 NOTE — PLAN OF CARE
[facility] - Med Surg  Discharge Assessment    Primary Care Provider: Primary Doctor No     Discharge Assessment (most recent)       BRIEF DISCHARGE ASSESSMENT - 05/31/23 1616          Discharge Planning    Assessment Type Discharge Planning Brief Assessment     Resource/Environmental Concerns none     Support Systems Spouse/significant other;Children;Friends/neighbors   Spouse- Hammad Gunn -[phone], [phone], [phone] and Daughter Federico Gunn- [phone]    Equipment Currently Used at Home wheelchair;walker, rolling     Current Living Arrangements home     Care Facility Name Home     Patient/Family Anticipates Transition to home with family     Patient/Family Anticipated Services at Transition none     DME Needed Upon Discharge  none     Discharge Plan A Psychiatric hospital     Discharge Plan B Home with family                     SW spoke with patient in 649 for Discharge Planning Assessment. Per patient, Pt lives with spouse in a single family home on a slab foundation with zero steps to porch and point of entry.  Patient was independent with ADLS and DID use DME or in-home assistive equipment. Pt is not on dialysis  or coumadin,  takes medications as prescribed / keeps refilled / has resources for all daily and prescriptive needs. [facility] - Wants any necessary medication sent to preferred pharmacy. Will have help from  Spouse- Hammad Gunn -[phone], [phone], [phone] and Daughter Federico Gunn- [phone] and other immediate family upon discharge - Family to provide transportation home if not admitted to [facility].  All questions addressed. Unit and SW direct numbers provided. Will continue to follow for course of hospitalization    5/30/2023  5:17 PM  Pyelonephritis [N12]  Nausea & vomiting [R11.2]  ODESSA (acute kidney injury) [N17.9]    PCP: Primary Doctor No    PHARMACY:   [id number] - [place], [place] - [address]  [id number]  [facility] [address]  Phone: [phone] Fax: [phone]    [facility]  [address]  [facility] [address]  Phone: [phone] Fax: [phone]      Payor: MEDICAID / Plan: [facility] / Product Type: Managed Medicaid /       Berenice Ricci LMSW  PRN - [facility]  [id number]

## 2023-05-31 NOTE — ED NOTES
Patient identifiers for [facility] checked and correct.    LOC: The patient is awake, alert and aware of environment with a flat affect, the patient is oriented x 4 and speaking appropriately.    APPEARANCE: Patient resting comfortably and in no acute distress, patient is clean and well groomed, patient's clothing is properly fastened.    SKIN: lower abdominal area at risk for moisture associated skin injury.    MUSCULOSKELETAL: Patient moving all extremities well, no obvious swelling or deformities noted.    RESPIRATORY: Airway is open and patent, respirations are spontaneous and even, patient has a normal effort and rate.    CARDIAC: Patient has a normal rate and rhythm, no periphreal edema noted, capillary refill < 3 seconds.    ABDOMEN: Nausea reported.    NEUROLOGIC: Eyes open spontaneously, PERRL, behavior appropriate to situation, follows commands, facial expression symmetrical, bilateral hand grasp equal and even, purposeful motor response noted, normal sensation in all extremities.     HEENT: No abnormalities noted. White sclera and pupils equal round and reactive to light. Denies headache, dizziness.     /GI: +colostomy, urostomy, left nephrostomy, urine, concentrated, +mucous

## 2023-05-31 NOTE — PHARMACY MED REC
"  Admission Medication History     The home medication history was taken by Santos Fisher.    You may go to "Admission" then "Reconcile Home Medications" tabs to review and/or act upon these items.     The home medication list has been updated by the Pharmacy department.   Please read ALL comments highlighted in yellow.   Please address this information as you see fit.    Feel free to contact us if you have any questions or require assistance.      The medications listed below were removed from the home medication list. Please reorder if appropriate:  Patient reports no longer taking the following medication(s):  ACETAMINOPHEN 500 MG  ALBUTEROL HFA 90 MCG/ACT INH  MELATONIN 3 MG  MIRTAZAPINE SOL-TAB 15 MG   OXYBUTYNIN XL 10 MG    Medications listed below were obtained from: Nursing home  PTA Medications   Medication Sig    DULoxetine 60 mg CDRS   Take 1,230 mg by mouth once daily.    gabapentin (NEURONTIN) 100 MG capsule   Take 2 capsules (200 mg total) by mouth every evening.    risperiDONE (RISPERDAL) 0.5 MG Tab   Take 0.5 mg by mouth 2 (two) times daily.    tamsulosin (FLOMAX) 0.4 mg Cap   Take 1 capsule (0.4 mg total) by mouth once daily. For spasm associated with nephrostomy tubes    tolterodine (DETROL LA) 2 MG Cp24   Take 2 mg by mouth once daily.    traZODone (DESYREL) 100 MG tablet   Take 100 mg by mouth every evening.    albuterol (VENTOLIN HFA) 90 mcg/actuation inhaler Inhale 2 puffs into the lungs every 6 (six) hours as needed for Wheezing or Shortness of Breath. Rescue (Patient not taking: Reported on 5/31/2023). Not listed on [employer] MAR.      oxybutynin (DITROPAN-XL) 10 MG 24 hr tablet Take 1 tablet (10 mg total) by mouth once daily. For spasms associated with neprhostomy tubes (Patient not taking: Reported on 5/31/2023). Not listed on [employer] MAR       Potential issues to be addressed PRIOR TO DISCHARGE  Patient reported not taking the following medications: (ALBUTEROL HFA, " DITROPAN-XL). These medications remain on the home medication list. Please address accordingly.           Santos Fisher  EXT [id number]                .

## 2023-05-31 NOTE — ASSESSMENT & PLAN NOTE
ODESSA on CKD  - Monitor renal function  - Avoid Nephrotoxic agents  - Monitor urine output   - Follow up Renal U/S

## 2023-05-31 NOTE — ASSESSMENT & PLAN NOTE
- S/p Transverse colon conduit 2020 complicate by bowel perforation requiring hemicolectomy & ileostomy  - Follow up outpatient CRS

## 2023-05-31 NOTE — HPI
59 y/o female with hx of cervical ca s/p XRT complicated by ureteral strictures and hydronephrosis requiring stent placements. Ultimately,  she failed the stents and a transverse colon conduit was performed.  This was complicated by colon perforation distal to the anastamosis.  She has been stable for a number of years but had to have bilateral nephrostomy tubes the right side for stones, the left due to concern for ureteroenteric anastamotic stricture.     She is followed by urology closely, she was last admitted 4/2023 for recurrent UTIs with ESBL Klebsiella and VRE. She underwent right PCN drain exchange and stone extraction on 4/21/23. She completed a 10 day course of abx for pyelonephritis.     She last saw urology on 5/10, planned to have a loopogram and antegrade nephrostogram to characterize the length of the stricture in planning for revision of the anastamosis.    Unfortunately she was seen in the ED following day for suicidal ideation after death of her daughter. She has been at [facility]. She had two ED visits for ostomy bag leakage requiring exchanges. She is now admitted for recurrent UTI with ESBL klebsiella without improvement with po bactrim. She is currently on meropenem. ID consulted for IV abx therapy.     Pt remained afebrile. Stable. No leukocytosis. ODESSA on admission. UA with many bacteruria. Blood cultures NGTD. Urine cultures pending. Prior CT studies Mild right-sided hydroureteronephrosis with mild inflammatory changes about the right ureter, similar to prior.Left nephrostomy tube is in stable position.  No left-sided hydronephrosis. U/s kidneys today.      Pt feels improved today. Has some bilateral flank pain. Denies having any fever chills or night sweats

## 2023-05-31 NOTE — NURSING
"Pt admitted to [facility]. AAOx4, VSS, sitter at bedside. Pt expressed suicidal ideations at present, and when asked if she had a plan pt responded, "yes". Asked what her plan would be, and she responded, "I will do exactly what my daughter did...shoot myself!"  Will continue to monitor.   "

## 2023-05-31 NOTE — ASSESSMENT & PLAN NOTE
59 y/o female hx of ureteral strictures due to XRT for cervical cancer s/p transverse colon conduit, bilateral nephrostomy tubes c/b recurrent pyelonephritis. She underwent right PCN drain exchange and stone extraction on 4/21/23, completed 10 day course of abx. She is admitted for recurrent UTIs, failed po bactrim. ID consulted for abx mgmt       Recommendations  1. Continue meropenem for now, will de-escalate based on urine studies  2. Prior CT with mild right-sided hydroureteronephrosis with mild inflammatory changes about the right ureter, similar to prior.Left nephrostomy tube is in stable position.  No left-sided hydronephrosis. U/s kidneys studies today. Recommend IR and urology evaluation if need for nephrostomy tube placement/exchange given CT findings  3. Blood cultures here NGTD.   4. Seen and discussed with ID staff. Discussed with primary team

## 2023-05-31 NOTE — HPI
58 year old female with Hx of distal ureteral strictures due to XRT for cervical cancer s/p transverse colon conduit and B/L nephrostomy tubes complicated by MDR infections who presents to the ED for UTI. Patient was discharged last month after presenting with recurrent UTI (E. Faecium VRE). History limited form patient. She had undergone right PCN drain removal and stone extraction with drain replacement. Completed a course of Ertapenem 1 g IV daily for 10 days and Linezolid and was discharged on Bactrim after ID evaluation. Patient denies any fever, chills, nausea, vomiting, hematemesis, cough, chest pain, palpitations, shortness of breath, abdominal pain/distension, changes in bowel or urinary habits, no hematochezia or melena.

## 2023-05-31 NOTE — CONSULTS
[facility]  Infectious Disease  Consult Note    Patient Name: Edita Riley  MRN: [id number]  Admission Date: 5/30/2023  Hospital Length of Stay: 0 days  Attending Physician: Dioni Brush MD  Primary Care Provider: Primary Doctor No     Isolation Status: No active isolations      Inpatient consult to Infectious Diseases  Consult performed by: Elizabeth Ron PA-C  Consult ordered by: Ghada Nix PA-C        Assessment/Plan:     Renal/  * UTI (urinary tract infection)  57 y/o female hx of ureteral strictures due to XRT for cervical cancer s/p transverse colon conduit, bilateral nephrostomy tubes c/b recurrent pyelonephritis. She underwent right PCN drain exchange and stone extraction on 4/21/23, completed 10 day course of abx. She is admitted for recurrent UTIs, failed po bactrim. ID consulted for abx mgmt       Recommendations  1. Continue meropenem for now, will de-escalate based on urine studies  2. Prior CT with mild right-sided hydroureteronephrosis with mild inflammatory changes about the right ureter, similar to prior.Left nephrostomy tube is in stable position.  No left-sided hydronephrosis. U/s kidneys studies today. Recommend IR and urology evaluation if need for nephrostomy tube placement/exchange given CT findings  3. Blood cultures here NGTD.   4. Seen and discussed with ID staff. Discussed with primary team            Thank you for your consult. I will follow-up with patient. Please contact us if you have any additional questions.    Elizabeth Ron PA-C  Infectious Disease  [facility]    Subjective:     Principal Problem: UTI (urinary tract infection)    HPI: 57 y/o female with hx of cervical ca s/p XRT complicated by ureteral strictures and hydronephrosis requiring stent placements. Ultimately,  she failed the stents and a transverse colon conduit was performed.  This was complicated by colon perforation distal to the anastamosis.  She has been stable for a  number of years but had to have bilateral nephrostomy tubes the right side for stones, the left due to concern for ureteroenteric anastamotic stricture.     She is followed by urology closely, she was last admitted 4/2023 for recurrent UTIs with ESBL Klebsiella and VRE. She underwent right PCN drain exchange and stone extraction on 4/21/23. She completed a 10 day course of abx for pyelonephritis.     She last saw urology on 5/10, planned to have a loopogram and antegrade nephrostogram to characterize the length of the stricture in planning for revision of the anastamosis.    Unfortunately she was seen in the ED following day for suicidal ideation after death of her daughter. She has been at [facility]. She had two ED visits for ostomy bag leakage requiring exchanges. She is now admitted for recurrent UTI with ESBL klebsiella without improvement with po bactrim. She is currently on meropenem. ID consulted for IV abx therapy.     Pt remained afebrile. Stable. No leukocytosis. ODESSA on admission. UA with many bacteruria. Blood cultures NGTD. Urine cultures pending. Prior CT studies Mild right-sided hydroureteronephrosis with mild inflammatory changes about the right ureter, similar to prior.Left nephrostomy tube is in stable position.  No left-sided hydronephrosis. U/s kidneys today.      Pt feels improved today. Has some bilateral flank pain. Denies having any fever chills or night sweats      Past Medical History:   Diagnosis Date    Abnormal mammogram 08/25/2020    Acute blood loss anemia     Acute deep vein thrombosis (DVT) of lower extremity 12/09/2020    Advance care planning 04/30/2021    Anemia due to chronic blood loss     Anemia due to chronic kidney disease     Anxiety     Bilateral ureteral obstruction 9/11/2020    Cardiovascular event risk -- low 09/14/2015    ASCVD 10-year risk 1.9% (with optimal risk factors 1.3%) as of 9/14/2015     Cervical cancer 2014    Chronic back pain      Colostomy care     Deep vein thrombosis     Depression     Diarrhea due to malabsorption 11/14/2018    Difficult intubation     Disorder of kidney and ureter     DVT of lower extremity, bilateral 11/04/2020    Emphysema of lung 4/10/2023    Fibromyalgia     Fungemia 09/27/2020    Generalized abdominal pain 08/25/2020    GERD (gastroesophageal reflux disease)     Hemifacial spasm 09/16/2015    Hiatal hernia 2014    History of cervical cancer 10/11/2018    Hx of psychiatric care     Cymbalta, trazodone    Hypertension     Hypomagnesemia 11/21/2018    Lactose intolerance     Metastatic squamous cell carcinoma to lymph node 10/11/2018    Neuropathy due to chemotherapeutic drug 11/14/2018    Osteoarthritis of back     Peritonitis 09/22/2020    Pseudomonas urinary tract infection 04/21/2021    Psychiatric problem     Refusal of blood transfusions as patient is Gnosticist     Schatzki's ring 09/14/2015    Seen on outside EGD 05/2014, underwent esophageal dilatation. Bx were negative.     Seizures     Sleep stage dysfunction     Noted on PSG 06/2017; negative for obstructive sleep apnea     Stroke     Urinary tract infection associated with nephrostomy catheter 06/11/2020    Wound infection after surgery 09/24/2020       Past Surgical History:   Procedure Laterality Date    ANTEGRADE NEPHROSTOGRAPHY Bilateral 9/28/2020    Procedure: Nephrostogram - antegrade;  Surgeon: Leslie Balbuena MD;  Location: [address];  Service: Urology;  Laterality: Bilateral;    ANTEGRADE NEPHROSTOGRAPHY Left 4/20/2021    Procedure: NEPHROSTOGRAM, ANTEGRADE;  Surgeon: Leslie Balbuena MD;  Location: [address];  Service: Urology;  Laterality: Left;    ANTEGRADE NEPHROSTOGRAPHY Right 10/27/2022    Procedure: NEPHROSTOGRAM, ANTEGRADE;  Surgeon: Chirag Russ MD;  Location: [address];  Service: Urology;  Laterality: Right;    ANTEGRADE NEPHROSTOGRAPHY Right 4/21/2023    Procedure:  NEPHROSTOGRAM, ANTEGRADE;  Surgeon: Chirag Russ MD;  Location: [facility] OR 2ND FLR;  Service: Urology;  Laterality: Right;    BILATERAL OOPHORECTOMY  2015    CHOLECYSTECTOMY  11/09/2016    Done at [facility], path showed chronic cholecystitis and gallstones    cold knife conization  2014    COLECTOMY, RIGHT  9/17/2020    Procedure: COLECTOMY, RIGHT Extended;  Surgeon: Hammad Reynolds MD;  Location: [facility] OR 2ND FLR;  Service: Colon and Rectal;;    COLONOSCOPY  2014    COLONOSCOPY N/A 10/24/2016    at [facility], Dr Gutiérrez    COLONOSCOPY N/A 5/18/2018    Procedure: COLONOSCOPY;  Surgeon: Arden Gutiérrez MD;  Location: [facility] ENDO (4TH FLR);  Service: Endoscopy;  Laterality: N/A;    COLONOSCOPY N/A 7/28/2020    Procedure: COLONOSCOPY;  Surgeon: Hammad Reynolds MD;  Location: [facility] (4TH FLR);  Service: Colon and Rectal;  Laterality: N/A;  covid test [place] 7/25    CYSTOSCOPY WITH URETEROSCOPY, RETROGRADE PYELOGRAPHY, AND INSERTION OF STENT Bilateral 3/21/2020    Procedure: CYSTOSCOPY, WITH RETROGRADE PYELOGRAM,;  Surgeon: Leslie Balbuena MD;  Location: [facility] OR 1ST FLR;  Service: Urology;  Laterality: Bilateral;    DILATION OF NEPHROSTOMY TRACT Right 10/27/2022    Procedure: DILATION, NEPHROSTOMY TRACT;  Surgeon: Chirag Russ MD;  Location: [facility] OR 1ST FLR;  Service: Urology;  Laterality: Right;    ESOPHAGOGASTRODUODENOSCOPY  2014    ESOPHAGOGASTRODUODENOSCOPY  11/18/2020    ESOPHAGOGASTRODUODENOSCOPY N/A 11/18/2020    Procedure: ESOPHAGOGASTRODUODENOSCOPY (EGD);  Surgeon: Zenon Spencer MD;  Location: [facility];  Service: Endoscopy;  Laterality: N/A;    ESOPHAGOGASTRODUODENOSCOPY N/A 12/11/2020    Procedure: EGD (ESOPHAGOGASTRODUODENOSCOPY);  Surgeon: Juancho Muse MD;  Location: [facility] ENDO (2ND FLR);  Service: Endoscopy;  Laterality: N/A;    HYSTERECTOMY  2014    [facility]    ILEOSTOMY  9/17/2020    Procedure: CREATION, ILEOSTOMY;  Surgeon: Hammad Reynolds MD;  Location:  [facility] OR 2ND FLR;  Service: Colon and Rectal;;    LOOPOGRAM N/A 4/20/2021    Procedure: LOOPOGRAM;  Surgeon: Leslie Balbuena MD;  Location: [facility] OR 1ST FLR;  Service: Urology;  Laterality: N/A;    MOBILIZATION OF SPLENIC FLEXURE N/A 9/11/2020    Procedure: MOBILIZATION, COLONIC;  Surgeon: Hammad Reynolds MD;  Location: [facility] OR 2ND FLR;  Service: Colon and Rectal;  Laterality: N/A;    NEPHROSTOGRAPHY Bilateral 4/17/2021    Procedure: Nephrostogram;  Surgeon: Celeste Surgeon;  Location: [facility] CELESTE;  Service: Anesthesiology;  Laterality: Bilateral;    OOPHORECTOMY      PERCUTANEOUS NEPHROLITHOTOMY Right 4/21/2023    Procedure: NEPHROLITHOTOMY, PERCUTANEOUS;  Surgeon: Chirag Russ MD;  Location: [facility] OR 2ND FLR;  Service: Urology;  Laterality: Right;  2.5 hrs    PERCUTANEOUS NEPHROSTOMY  4/21/2023    Procedure: CREATION, NEPHROSTOMY, PERCUTANEOUS with removal of existing nephrostomy tube;  Surgeon: Chirag Russ MD;  [facility];  Service: Urology;;    PYELOSCOPY Right 10/27/2022    Procedure: PYELOSCOPY;  Surgeon: Chirag Russ MD;  [facility];  Service: Urology;  Laterality: Right;    REPLACEMENT OF NEPHROSTOMY TUBE Right 10/27/2022    Procedure: REPLACEMENT, NEPHROSTOMY TUBE;  Surgeon: Chirag Russ MD;  [facility];  Service: Urology;  Laterality: Right;    RETROPERITONEAL LYMPHADENECTOMY Right 9/17/2018    Procedure: LYMPHADENECTOMY, RETROPERITONEUM;  Surgeon: Sebas Reed MD;  Location: [facility] OR [facility] FLR;  Service: General;  Laterality: Right;  ILIAC    URETEROSCOPIC REMOVAL OF URETERIC CALCULUS Right 10/27/2022    Procedure: REMOVAL, CALCULUS, URETER, URETEROSCOPIC;  Surgeon: Chirag Russ MD;  Location: [facility] OR 1ST FLR;  Service: Urology;  Laterality: Right;    URETEROSCOPY Right 10/27/2022    Procedure: URETEROSCOPY-ANTEGRADE;  Surgeon: Chirag Russ MD;  Location: [facility] OR [facility] FLR;  Service: Urology;  Laterality: Right;        Review of patient's allergies indicates:   Allergen Reactions    Bee sting [allergen ext-venom-honey bee]      Rash      Grass pollen-bermuda, standard      rash       Medications:  Medications Prior to Admission   Medication Sig    acetaminophen (TYLENOL) 500 MG tablet Take 1 tablet (500 mg total) by mouth every 4 (four) hours as needed for Pain.    albuterol (VENTOLIN HFA) 90 mcg/actuation inhaler Inhale 2 puffs into the lungs every 6 (six) hours as needed for Wheezing or Shortness of Breath. Rescue    gabapentin (NEURONTIN) 100 MG capsule Take 2 capsules (200 mg total) by mouth every evening.    melatonin (MELATIN) 3 mg tablet Take 2 tablets (6 mg total) by mouth nightly as needed for Insomnia.    oxybutynin (DITROPAN-XL) 10 MG 24 hr tablet Take 1 tablet (10 mg total) by mouth once daily. For spasms associated with neprhostomy tubes    tamsulosin (FLOMAX) 0.4 mg Cap Take 1 capsule (0.4 mg total) by mouth once daily. For spasm associated with nephrostomy tubes    mirtazapine (REMERON SOL-TAB) 15 MG disintegrating tablet Dissolve 1 tablet (15 mg total) by mouth nightly.     Antibiotics (From admission, onward)      Start     Stop Route Frequency Ordered    05/31/23 0900  meropenem (MERREM) 2 g in sodium chloride 0.9% 100 mL IVPB         -- IV Every 12 hours (non-standard times) 05/30/23 2319          Antifungals (From admission, onward)      None          Antivirals (From admission, onward)      None             Immunization History   Administered Date(s) Administered    Influenza - Quadrivalent - PF *Preferred* (6 months and older) 11/16/2017, 09/19/2019    Tdap 03/28/2017       Family History       Problem Relation (Age of Onset)    Breast cancer Maternal Aunt    Cancer Father, Mother    Cervical cancer Mother    Colon cancer Father    Drug abuse Daughter, Daughter    Esophageal cancer Father    Heart disease Sister, Maternal Grandmother    Suicide Daughter          Social History      Socioeconomic History    Marital status:      Spouse name: Hammad    Number of children: 2   Tobacco Use    Smoking status: Never    Smokeless tobacco: Never   Substance and Sexual Activity    Alcohol use: No     Alcohol/week: 0.0 standard drinks    Drug use: No    Sexual activity: Yes     Partners: Male     Birth control/protection: None     Comment:  19 years since    Social History Narrative    , twin daughters (1  2018), disabled due to childhood stroke, Druze sophia     Social Determinants of Health     Financial Resource Strain: Low Risk     Difficulty of Paying Living Expenses: Not very hard   Food Insecurity: No Food Insecurity    Worried About Running Out of Food in the Last Year: Never true    Ran Out of Food in the Last Year: Never true   Transportation Needs: No Transportation Needs    Lack of Transportation (Medical): No    Lack of Transportation (Non-Medical): No   Physical Activity: Inactive    Days of Exercise per Week: 0 days    Minutes of Exercise per Session: 0 min   Stress: No Stress Concern Present    Feeling of Stress : Only a little   Social Connections: Moderately Isolated    Frequency of Communication with Friends and Family: More than three times a week    Frequency of Social Gatherings with Friends and Family: More than three times a week    Attends Latter day Services: Never    Active Member of Clubs or Organizations: No    Attends Club or Organization Meetings: Never    Marital Status:    Housing Stability: Low Risk     Unable to Pay for Housing in the Last Year: No    Number of Places Lived in the Last Year: 1    Unstable Housing in the Last Year: No     Review of Systems   Unable to perform ROS: Psychiatric disorder   Genitourinary:  Positive for flank pain. Negative for hematuria.   Objective:     Vital Signs (Most Recent):  Temp: 98.2 °F (36.8 °C) (23)  Pulse: 87 (23)  Resp: 16  (05/31/23 0729)  BP: (!) 111/58 (05/31/23 0729)  SpO2: 95 % (05/31/23 0729) Vital Signs (24h Range):  Temp:  [97.7 °F (36.5 °C)-98.2 °F (36.8 °C)] 98.2 °F (36.8 °C)  Pulse:  [] 87  Resp:  [16-20] 16  SpO2:  [95 %-100 %] 95 %  BP: (104-117)/(55-61) 111/58     Weight: 83 kg (183 lb)  Body mass index is 27.02 kg/m².    Estimated Creatinine Clearance: 49.2 mL/min (based on SCr of 1.4 mg/dL).     Physical Exam  Vitals and nursing note reviewed.   Constitutional:       General: She is not in acute distress.     Appearance: She is well-developed. She is not diaphoretic.   HENT:      Head: Normocephalic and atraumatic.   Eyes:      Pupils: Pupils are equal, round, and reactive to light.   Cardiovascular:      Rate and Rhythm: Normal rate and regular rhythm.      Heart sounds: Normal heart sounds. No murmur heard.    No friction rub. No gallop.   Pulmonary:      Effort: Pulmonary effort is normal. No respiratory distress.      Breath sounds: Normal breath sounds. No wheezing or rales.   Chest:      Chest wall: No tenderness.   Abdominal:      General: Bowel sounds are normal. There is no distension.      Palpations: Abdomen is soft. There is no mass.      Tenderness: There is no abdominal tenderness. There is no guarding or rebound.      Hernia: No hernia is present.      Comments: Ileostomy site and conduit site c/d/I   Left nephrostomy tube stable    Musculoskeletal:         General: No tenderness or deformity. Normal range of motion.      Cervical back: Normal range of motion and neck supple.   Skin:     General: Skin is warm and dry.      Coloration: Skin is not pale.      Findings: No erythema.   Neurological:      Mental Status: She is alert and oriented to person, place, and time.      Cranial Nerves: No cranial nerve deficit.      Coordination: Coordination normal.   Psychiatric:         Behavior: Behavior normal.         Thought Content: Thought content normal.        Significant Labs: All pertinent labs  within the past 24 hours have been reviewed.    Significant Imaging: I have reviewed all pertinent imaging results/findings within the past 24 hours.

## 2023-05-31 NOTE — ASSESSMENT & PLAN NOTE
- S/p Bilateral Nephrostomy tubes; R. Removed after recent complication and remains with L.Nephrostomy  - Continue with Flomax 0.4 mg, daily  - Follow up renal ultrasound

## 2023-05-31 NOTE — ASSESSMENT & PLAN NOTE
- Recurrent Urinary tact infection related to nephrostomy tube and chronic uretral strictures  - Follow up urine cultures and blood cultures  - Urine culture 4/10 growing klebsiella and enterococcus.  - Follow up ID consult

## 2023-05-31 NOTE — ED NOTES
Patient ready for transport, tech bedside, ED tech will assist escort, awaiting security's arrival.

## 2023-05-31 NOTE — H&P
[facility] - Emergency Dept  Hospital Medicine  History & Physical    Patient Name: Edita Riley  MRN: [id number]  Patient Class: OP- Observation  Admission Date: 5/30/2023  Attending Physician: Joe Muse MD   Primary Care Provider: Primary Doctor No    Patient information was obtained from patient and ER records.     Subjective:     Principal Problem:UTI (urinary tract infection)    Chief Complaint:   Chief Complaint   Patient presents with    Cystitis     Pt w/ hx of recurrent UTIs reports from [facility], under formal voluntary admission, for worsening RFTs and UTI. Ileostomy, urostomy, and nephrostomy tubes present.         HPI: 58 year old female with Hx of distal ureteral strictures due to XRT for cervical cancer s/p transverse colon conduit and B/L nephrostomy tubes complicated by MDR infections who presents to the ED for UTI. Patient was discharged last month after presenting with recurrent UTI (E. Faecium VRE). History limited form patient. She had undergone right PCN drain removal and stone extraction with drain replacement. Completed a course of Ertapenem 1 g IV daily for 10 days and Linezolid and was discharged on Bactrim after ID evaluation. Patient denies any fever, chills, nausea, vomiting, hematemesis, cough, chest pain, palpitations, shortness of breath, abdominal pain/distension, changes in bowel or urinary habits, no hematochezia or melena.      Past Medical History:   Diagnosis Date    Abnormal mammogram 08/25/2020    Acute blood loss anemia     Acute deep vein thrombosis (DVT) of lower extremity 12/09/2020    Advance care planning 04/30/2021    Anemia due to chronic blood loss     Anemia due to chronic kidney disease     Anxiety     Bilateral ureteral obstruction 9/11/2020    Cardiovascular event risk -- low 09/14/2015    ASCVD 10-year risk 1.9% (with optimal risk factors 1.3%) as of 9/14/2015     Cervical cancer 2014    Chronic back pain     Colostomy care      Deep vein thrombosis     Depression     Diarrhea due to malabsorption 11/14/2018    Difficult intubation     Disorder of kidney and ureter     DVT of lower extremity, bilateral 11/04/2020    Emphysema of lung 4/10/2023    Fibromyalgia     Fungemia 09/27/2020    Generalized abdominal pain 08/25/2020    GERD (gastroesophageal reflux disease)     Hemifacial spasm 09/16/2015    Hiatal hernia 2014    History of cervical cancer 10/11/2018    Hx of psychiatric care     Cymbalta, trazodone    Hypertension     Hypomagnesemia 11/21/2018    Lactose intolerance     Metastatic squamous cell carcinoma to lymph node 10/11/2018    Neuropathy due to chemotherapeutic drug 11/14/2018    Osteoarthritis of back     Peritonitis 09/22/2020    Pseudomonas urinary tract infection 04/21/2021    Psychiatric problem     Refusal of blood transfusions as patient is Alevism     Schatzki's ring 09/14/2015    Seen on outside EGD 05/2014, underwent esophageal dilatation. Bx were negative.     Seizures     Sleep stage dysfunction     Noted on PSG 06/2017; negative for obstructive sleep apnea     Stroke     Urinary tract infection associated with nephrostomy catheter 06/11/2020    Wound infection after surgery 09/24/2020       Past Surgical History:   Procedure Laterality Date    ANTEGRADE NEPHROSTOGRAPHY Bilateral 9/28/2020    Procedure: Nephrostogram - antegrade;  Surgeon: Leslie Balbuena MD;  Location: [address];  Service: Urology;  Laterality: Bilateral;    ANTEGRADE NEPHROSTOGRAPHY Left 4/20/2021    Procedure: NEPHROSTOGRAM, ANTEGRADE;  Surgeon: Leslie Balbuena MD;  Location: [address];  Service: Urology;  Laterality: Left;    ANTEGRADE NEPHROSTOGRAPHY Right 10/27/2022    Procedure: NEPHROSTOGRAM, ANTEGRADE;  Surgeon: Chirag Russ MD;  Location: [address];  Service: Urology;  Laterality: Right;    ANTEGRADE NEPHROSTOGRAPHY Right 4/21/2023    Procedure: NEPHROSTOGRAM,  ANTEGRADE;  Surgeon: Chirag Russ MD;  Location: [facility] OR 2ND FLR;  Service: Urology;  Laterality: Right;    BILATERAL OOPHORECTOMY  2015    CHOLECYSTECTOMY  11/09/2016    Done at [facility], path showed chronic cholecystitis and gallstones    cold knife conization  2014    COLECTOMY, RIGHT  9/17/2020    Procedure: COLECTOMY, RIGHT Extended;  Surgeon: Hammad Reynolds MD;  Location: [facility] OR 2ND FLR;  Service: Colon and Rectal;;    COLONOSCOPY  2014    COLONOSCOPY N/A 10/24/2016    at [facility], Dr Gutiérrez    COLONOSCOPY N/A 5/18/2018    Procedure: COLONOSCOPY;  Surgeon: Arden Gutiérrez MD;  Location: [facility] ENDO (4TH FLR);  Service: Endoscopy;  Laterality: N/A;    COLONOSCOPY N/A 7/28/2020    Procedure: COLONOSCOPY;  Surgeon: Hammad Reynolds MD;  Location: [facility] ENDO (4TH FLR);  Service: Colon and Rectal;  Laterality: N/A;  covid test [place] 7/25    CYSTOSCOPY WITH URETEROSCOPY, RETROGRADE PYELOGRAPHY, AND INSERTION OF STENT Bilateral 3/21/2020    Procedure: CYSTOSCOPY, WITH RETROGRADE PYELOGRAM,;  Surgeon: Leslie Balbuena MD;  Location: [facility] OR 1ST FLR;  Service: Urology;  Laterality: Bilateral;    DILATION OF NEPHROSTOMY TRACT Right 10/27/2022    Procedure: DILATION, NEPHROSTOMY TRACT;  Surgeon: Chirag Russ MD;  Location: [facility] OR 1ST FLR;  Service: Urology;  Laterality: Right;    ESOPHAGOGASTRODUODENOSCOPY  2014    ESOPHAGOGASTRODUODENOSCOPY  11/18/2020    ESOPHAGOGASTRODUODENOSCOPY N/A 11/18/2020    Procedure: ESOPHAGOGASTRODUODENOSCOPY (EGD);  Surgeon: Zenon Spencer MD;  Location: [facility] ENDO;  Service: Endoscopy;  Laterality: N/A;    ESOPHAGOGASTRODUODENOSCOPY N/A 12/11/2020    Procedure: EGD (ESOPHAGOGASTRODUODENOSCOPY);  Surgeon: Juancho Muse MD;  Location: [facility] ENDO (2ND FLR);  Service: Endoscopy;  Laterality: N/A;    HYSTERECTOMY  2014    [facility] for cervical cancer    ILEOSTOMY  9/17/2020    Procedure: CREATION, ILEOSTOMY;  Surgeon: Hammad Reynolds MD;  [facility];   Service: Colon and Rectal;;    LOOPOGRAM N/A 4/20/2021    Procedure: LOOPOGRAM;  Surgeon: Leslie Balbuena MD;  Location: [facility] OR 1ST FLR;  Service: Urology;  Laterality: N/A;    MOBILIZATION OF SPLENIC FLEXURE N/A 9/11/2020    Procedure: MOBILIZATION, COLONIC;  Surgeon: Hammad Reynolds MD;  Location: [facility] OR 2ND FLR;  Service: Colon and Rectal;  Laterality: N/A;    NEPHROSTOGRAPHY Bilateral 4/17/2021    Procedure: Nephrostogram;  Surgeon: Celeste Surgeon;  Location: [facility] CELESTE;  Service: Anesthesiology;  Laterality: Bilateral;    OOPHORECTOMY      PERCUTANEOUS NEPHROLITHOTOMY Right 4/21/2023    Procedure: NEPHROLITHOTOMY, PERCUTANEOUS;  Surgeon: Chirag Russ MD;  Location: [facility] OR 2ND FLR;  Service: Urology;  Laterality: Right;  2.5 hrs    PERCUTANEOUS NEPHROSTOMY  4/21/2023    Procedure: CREATION, NEPHROSTOMY, PERCUTANEOUS with removal of existing nephrostomy tube;  Surgeon: Chirag Russ MD;  [facility];  Service: Urology;;    PYELOSCOPY Right 10/27/2022    Procedure: PYELOSCOPY;  Surgeon: Chirag Russ MD;  [facility];  Service: Urology;  Laterality: Right;    REPLACEMENT OF NEPHROSTOMY TUBE Right 10/27/2022    Procedure: REPLACEMENT, NEPHROSTOMY TUBE;  Surgeon: Chirag Russ MD;  [facility];  Service: Urology;  Laterality: Right;    RETROPERITONEAL LYMPHADENECTOMY Right 9/17/2018    Procedure: LYMPHADENECTOMY, RETROPERITONEUM;  Surgeon: Sebas Reed MD;  [facility];  Service: General;  Laterality: Right;  ILIAC    URETEROSCOPIC REMOVAL OF URETERIC CALCULUS Right 10/27/2022    Procedure: REMOVAL, CALCULUS, URETER, URETEROSCOPIC;  Surgeon: Chirag Russ MD;  [facility];  Service: Urology;  Laterality: Right;    URETEROSCOPY Right 10/27/2022    Procedure: URETEROSCOPY-ANTEGRADE;  Surgeon: Chirag Russ MD;  [facility];  Service: Urology;  Laterality: Right;       Review of  patient's allergies indicates:   Allergen Reactions    Bee sting [allergen ext-venom-honey bee]      Rash      Grass pollen-bermuda, standard      rash       No current facility-administered medications on file prior to encounter.     Current Outpatient Medications on File Prior to Encounter   Medication Sig    acetaminophen (TYLENOL) 500 MG tablet Take 1 tablet (500 mg total) by mouth every 4 (four) hours as needed for Pain.    albuterol (VENTOLIN HFA) 90 mcg/actuation inhaler Inhale 2 puffs into the lungs every 6 (six) hours as needed for Wheezing or Shortness of Breath. Rescue    gabapentin (NEURONTIN) 100 MG capsule Take 2 capsules (200 mg total) by mouth every evening.    melatonin (MELATIN) 3 mg tablet Take 2 tablets (6 mg total) by mouth nightly as needed for Insomnia.    oxybutynin (DITROPAN-XL) 10 MG 24 hr tablet Take 1 tablet (10 mg total) by mouth once daily. For spasms associated with neprhostomy tubes    tamsulosin (FLOMAX) 0.4 mg Cap Take 1 capsule (0.4 mg total) by mouth once daily. For spasm associated with nephrostomy tubes    mirtazapine (REMERON SOL-TAB) 15 MG disintegrating tablet Dissolve 1 tablet (15 mg total) by mouth nightly.     Family History       Problem Relation (Age of Onset)    Breast cancer Maternal Aunt    Cancer Father, Mother    Cervical cancer Mother    Colon cancer Father    Drug abuse Daughter, Daughter    Esophageal cancer Father    Heart disease Sister, Maternal Grandmother    Suicide Daughter          Tobacco Use    Smoking status: Never    Smokeless tobacco: Never   Substance and Sexual Activity    Alcohol use: No     Alcohol/week: 0.0 standard drinks    Drug use: No    Sexual activity: Yes     Partners: Male     Birth control/protection: None     Comment:  17 years since 1999       Objective:     Vital Signs (Most Recent):  Temp: 98.1 °F (36.7 °C) (05/30/23 1541)  Pulse: 100 (05/30/23 1541)  Resp: 18 (05/30/23 1541)  BP: 109/61 (05/30/23 1541)  SpO2:  100 % (05/30/23 1541) Vital Signs (24h Range):  Temp:  [98.1 °F (36.7 °C)] 98.1 °F (36.7 °C)  Pulse:  [100] 100  Resp:  [18] 18  SpO2:  [100 %] 100 %  BP: (109)/(61) 109/61     Weight: 82.6 kg (182 lb)  Body mass index is 26.88 kg/m².     Physical Exam  Vitals and nursing note reviewed.   Constitutional:       Appearance: Normal appearance.   HENT:      Head: Atraumatic.   Eyes:      General: No scleral icterus.     Conjunctiva/sclera: Conjunctivae normal.   Cardiovascular:      Rate and Rhythm: Normal rate and regular rhythm.      Heart sounds: No murmur heard.  Pulmonary:      Effort: Pulmonary effort is normal.      Breath sounds: Normal breath sounds. No wheezing.   Musculoskeletal:      Right lower leg: No edema.      Left lower leg: No edema.   Skin:     General: Skin is warm.   Neurological:      General: No focal deficit present.   Psychiatric:         Mood and Affect: Mood normal.       Significant Labs: All pertinent labs within the past 24 hours have been reviewed.    Significant Imaging: I have reviewed all pertinent imaging results/findings within the past 24 hours.    Assessment/Plan:     * UTI (urinary tract infection)  - Recurrent Urinary tact infection related to nephrostomy tube and chronic uretral strictures  - Follow up urine cultures and blood cultures  - Urine culture 4/10 growing klebsiella and enterococcus.  - Follow up ID consult    ODESSA (acute kidney injury)  ODESSA on CKD  - Monitor renal function  - Avoid Nephrotoxic agents  - Monitor urine output   - Follow up Renal U/S    Nephrostomy status  - S/p Bilateral Nephrostomy tubes; R. Removed after recent complication and remains with L.Nephrostomy  - Continue with Flomax 0.4 mg, daily  - Follow up renal ultrasound    Ileostomy in place  - S/p Transverse colon conduit 2020 complicate by bowel perforation requiring hemicolectomy & ileostomy  - Follow up outpatient CRS    VTE Risk Mitigation (From admission, onward)         Ordered     enoxaparin  injection 40 mg  Daily         05/30/23 2228     IP VTE HIGH RISK PATIENT  Once         05/30/23 2228     Place sequential compression device  Until discontinued         05/30/23 2228              On 05/30/2023, patient should be placed in hospital observation services under my care.    Ana Paulino MD  Department of Hospital Medicine  [facility] - Emergency Dept

## 2023-05-31 NOTE — ED NOTES
Telemetry Verification   Patient placed on Telemetry Box  Verified with War Room  Box #  [id number]   Monitor Tech Max   Rate 87   Rhythm NSR

## 2023-05-31 NOTE — SUBJECTIVE & OBJECTIVE
Past Medical History:   Diagnosis Date    Abnormal mammogram 08/25/2020    Acute blood loss anemia     Acute deep vein thrombosis (DVT) of lower extremity 12/09/2020    Advance care planning 04/30/2021    Anemia due to chronic blood loss     Anemia due to chronic kidney disease     Anxiety     Bilateral ureteral obstruction 9/11/2020    Cardiovascular event risk -- low 09/14/2015    ASCVD 10-year risk 1.9% (with optimal risk factors 1.3%) as of 9/14/2015     Cervical cancer 2014    Chronic back pain     Colostomy care     Deep vein thrombosis     Depression     Diarrhea due to malabsorption 11/14/2018    Difficult intubation     Disorder of kidney and ureter     DVT of lower extremity, bilateral 11/04/2020    Emphysema of lung 4/10/2023    Fibromyalgia     Fungemia 09/27/2020    Generalized abdominal pain 08/25/2020    GERD (gastroesophageal reflux disease)     Hemifacial spasm 09/16/2015    Hiatal hernia 2014    History of cervical cancer 10/11/2018    Hx of psychiatric care     Cymbalta, trazodone    Hypertension     Hypomagnesemia 11/21/2018    Lactose intolerance     Metastatic squamous cell carcinoma to lymph node 10/11/2018    Neuropathy due to chemotherapeutic drug 11/14/2018    Osteoarthritis of back     Peritonitis 09/22/2020    Pseudomonas urinary tract infection 04/21/2021    Psychiatric problem     Refusal of blood transfusions as patient is Gnosticist     Schatzki's ring 09/14/2015    Seen on outside EGD 05/2014, underwent esophageal dilatation. Bx were negative.     Seizures     Sleep stage dysfunction     Noted on PSG 06/2017; negative for obstructive sleep apnea     Stroke     Urinary tract infection associated with nephrostomy catheter 06/11/2020    Wound infection after surgery 09/24/2020       Past Surgical History:   Procedure Laterality Date    ANTEGRADE NEPHROSTOGRAPHY Bilateral 9/28/2020    Procedure: Nephrostogram - antegrade;  Surgeon: Leslie Balbuena MD;  Location: [facility] OR [address] [place] [address];   Service: Urology;  Laterality: Bilateral;    ANTEGRADE NEPHROSTOGRAPHY Left 4/20/2021    Procedure: NEPHROSTOGRAM, ANTEGRADE;  Surgeon: Leslie Balbuena MD;  Location: [facility] OR 1ST FLR;  Service: Urology;  Laterality: Left;    ANTEGRADE NEPHROSTOGRAPHY Right 10/27/2022    Procedure: NEPHROSTOGRAM, ANTEGRADE;  Surgeon: Chirag Russ MD;  Location: [facility] OR 1ST FLR;  Service: Urology;  Laterality: Right;    ANTEGRADE NEPHROSTOGRAPHY Right 4/21/2023    Procedure: NEPHROSTOGRAM, ANTEGRADE;  Surgeon: Chirag Russ MD;  Location: [facility] OR 2ND FLR;  Service: Urology;  Laterality: Right;    BILATERAL OOPHORECTOMY  2015    CHOLECYSTECTOMY  11/09/2016    Done at [facility], path showed chronic cholecystitis and gallstones    cold knife conization  2014    COLECTOMY, RIGHT  9/17/2020    Procedure: COLECTOMY, RIGHT Extended;  Surgeon: Hammad Reynolds MD;  Location: [facility] OR 2ND FLR;  Service: Colon and Rectal;;    COLONOSCOPY  2014    COLONOSCOPY N/A 10/24/2016    at [facility], Dr Gutiérrez    COLONOSCOPY N/A 5/18/2018    Procedure: COLONOSCOPY;  Surgeon: Arden Gutiérrez MD;  Location: [facility] ENDO (4TH FLR);  Service: Endoscopy;  Laterality: N/A;    COLONOSCOPY N/A 7/28/2020    Procedure: COLONOSCOPY;  Surgeon: Hammad Reynolds MD;  Location: [facility] ENDO (4TH FLR);  Service: Colon and Rectal;  Laterality: N/A;  covid test [place] 7/25    CYSTOSCOPY WITH URETEROSCOPY, RETROGRADE PYELOGRAPHY, AND INSERTION OF STENT Bilateral 3/21/2020    Procedure: CYSTOSCOPY, WITH RETROGRADE PYELOGRAM,;  Surgeon: Leslie Balbuena MD;  Location: [facility] OR 1ST FLR;  Service: Urology;  Laterality: Bilateral;    DILATION OF NEPHROSTOMY TRACT Right 10/27/2022    Procedure: DILATION, NEPHROSTOMY TRACT;  Surgeon: Chirag Russ MD;  Location: [facility] OR 1ST FLR;  Service: Urology;  Laterality: Right;    ESOPHAGOGASTRODUODENOSCOPY  2014    ESOPHAGOGASTRODUODENOSCOPY  11/18/2020    ESOPHAGOGASTRODUODENOSCOPY N/A 11/18/2020    Procedure:  ESOPHAGOGASTRODUODENOSCOPY (EGD);  Surgeon: Zenon Spencer MD;  Location: [facility] ENDO;  Service: Endoscopy;  Laterality: N/A;    ESOPHAGOGASTRODUODENOSCOPY N/A 12/11/2020    Procedure: EGD (ESOPHAGOGASTRODUODENOSCOPY);  Surgeon: Juancho Muse MD;  Location: [facility] (2ND FLR);  Service: Endoscopy;  Laterality: N/A;    HYSTERECTOMY  2014    [facility] for cervical cancer    ILEOSTOMY  9/17/2020    Procedure: CREATION, ILEOSTOMY;  Surgeon: Hammad Reynolds MD;  Location: [facility] OR 2ND FLR;  Service: Colon and Rectal;;    LOOPOGRAM N/A 4/20/2021    Procedure: LOOPOGRAM;  Surgeon: Leslie Balbuena MD;  Location: [facility] OR 1ST FLR;  Service: Urology;  Laterality: N/A;    MOBILIZATION OF SPLENIC FLEXURE N/A 9/11/2020    Procedure: MOBILIZATION, COLONIC;  Surgeon: Hammad Reynolds MD;  Location: [facility] OR 2ND FLR;  Service: Colon and Rectal;  Laterality: N/A;    NEPHROSTOGRAPHY Bilateral 4/17/2021    Procedure: Nephrostogram;  Surgeon: Celeste Surgeon;  Location: [facility];  Service: Anesthesiology;  Laterality: Bilateral;    OOPHORECTOMY      PERCUTANEOUS NEPHROLITHOTOMY Right 4/21/2023    Procedure: NEPHROLITHOTOMY, PERCUTANEOUS;  Surgeon: Chirag Russ MD;  Location: [facility] OR 2ND FLR;  Service: Urology;  Laterality: Right;  2.5 hrs    PERCUTANEOUS NEPHROSTOMY  4/21/2023    Procedure: CREATION, NEPHROSTOMY, PERCUTANEOUS with removal of existing nephrostomy tube;  Surgeon: Chirag Russ MD;  Location: [facility] OR 2ND FLR;  Service: Urology;;    PYELOSCOPY Right 10/27/2022    Procedure: PYELOSCOPY;  Surgeon: Chirag Russ MD;  Location: [facility] OR 1ST FLR;  Service: Urology;  Laterality: Right;    REPLACEMENT OF NEPHROSTOMY TUBE Right 10/27/2022    Procedure: REPLACEMENT, NEPHROSTOMY TUBE;  Surgeon: Chirag Russ MD;  Location: [facility] OR 1ST FLR;  Service: Urology;  Laterality: Right;    RETROPERITONEAL LYMPHADENECTOMY Right 9/17/2018    Procedure: LYMPHADENECTOMY, RETROPERITONEUM;  Surgeon: Sebas Reed MD;   Location: [facility] OR 2ND FLR;  Service: General;  Laterality: Right;  ILIAC    URETEROSCOPIC REMOVAL OF URETERIC CALCULUS Right 10/27/2022    Procedure: REMOVAL, CALCULUS, URETER, URETEROSCOPIC;  Surgeon: Chirag Russ MD;  Location: [facility] OR 1ST FLR;  Service: Urology;  Laterality: Right;    URETEROSCOPY Right 10/27/2022    Procedure: URETEROSCOPY-ANTEGRADE;  Surgeon: Chirag Russ MD;  [facility];  Service: Urology;  Laterality: Right;       Review of patient's allergies indicates:   Allergen Reactions    Bee sting [allergen ext-venom-honey bee]      Rash      Grass pollen-bermuda, standard      rash       No current facility-administered medications on file prior to encounter.     Current Outpatient Medications on File Prior to Encounter   Medication Sig    acetaminophen (TYLENOL) 500 MG tablet Take 1 tablet (500 mg total) by mouth every 4 (four) hours as needed for Pain.    albuterol (VENTOLIN HFA) 90 mcg/actuation inhaler Inhale 2 puffs into the lungs every 6 (six) hours as needed for Wheezing or Shortness of Breath. Rescue    gabapentin (NEURONTIN) 100 MG capsule Take 2 capsules (200 mg total) by mouth every evening.    melatonin (MELATIN) 3 mg tablet Take 2 tablets (6 mg total) by mouth nightly as needed for Insomnia.    oxybutynin (DITROPAN-XL) 10 MG 24 hr tablet Take 1 tablet (10 mg total) by mouth once daily. For spasms associated with neprhostomy tubes    tamsulosin (FLOMAX) 0.4 mg Cap Take 1 capsule (0.4 mg total) by mouth once daily. For spasm associated with nephrostomy tubes    mirtazapine (REMERON SOL-TAB) 15 MG disintegrating tablet Dissolve 1 tablet (15 mg total) by mouth nightly.     Family History       Problem Relation (Age of Onset)    Breast cancer Maternal Aunt    Cancer Father, Mother    Cervical cancer Mother    Colon cancer Father    Drug abuse Daughter, Daughter    Esophageal cancer Father    Heart disease Sister, Maternal Grandmother    Suicide Daughter           Tobacco Use    Smoking status: Never    Smokeless tobacco: Never   Substance and Sexual Activity    Alcohol use: No     Alcohol/week: 0.0 standard drinks    Drug use: No    Sexual activity: Yes     Partners: Male     Birth control/protection: None     Comment:  19 years since 1999       Objective:     Vital Signs (Most Recent):  Temp: 98.1 °F (36.7 °C) (05/30/23 1541)  Pulse: 100 (05/30/23 1541)  Resp: 18 (05/30/23 1541)  BP: 109/61 (05/30/23 1541)  SpO2: 100 % (05/30/23 1541) Vital Signs (24h Range):  Temp:  [98.1 °F (36.7 °C)] 98.1 °F (36.7 °C)  Pulse:  [100] 100  Resp:  [18] 18  SpO2:  [100 %] 100 %  BP: (109)/(61) 109/61     Weight: 82.6 kg (182 lb)  Body mass index is 26.88 kg/m².     Physical Exam  Vitals and nursing note reviewed.   Constitutional:       Appearance: Normal appearance.   HENT:      Head: Atraumatic.   Eyes:      General: No scleral icterus.     Conjunctiva/sclera: Conjunctivae normal.   Cardiovascular:      Rate and Rhythm: Normal rate and regular rhythm.      Heart sounds: No murmur heard.  Pulmonary:      Effort: Pulmonary effort is normal.      Breath sounds: Normal breath sounds. No wheezing.   Musculoskeletal:      Right lower leg: No edema.      Left lower leg: No edema.   Skin:     General: Skin is warm.   Neurological:      General: No focal deficit present.   Psychiatric:         Mood and Affect: Mood normal.       Significant Labs: All pertinent labs within the past 24 hours have been reviewed.    Significant Imaging: I have reviewed all pertinent imaging results/findings within the past 24 hours.

## 2023-06-01 LAB
ANION GAP SERPL CALC-SCNC: 6 MMOL/L (ref 8–16)
BACTERIA UR CULT: ABNORMAL
BASOPHILS # BLD AUTO: 0.03 K/UL (ref 0–0.2)
BASOPHILS NFR BLD: 0.5 % (ref 0–1.9)
BUN SERPL-MCNC: 21 MG/DL (ref 6–20)
CALCIUM SERPL-MCNC: 9.3 MG/DL (ref 8.7–10.5)
CHLORIDE SERPL-SCNC: 112 MMOL/L (ref 95–110)
CO2 SERPL-SCNC: 20 MMOL/L (ref 23–29)
CREAT SERPL-MCNC: 1.4 MG/DL (ref 0.5–1.4)
DIFFERENTIAL METHOD: ABNORMAL
EOSINOPHIL # BLD AUTO: 0.1 K/UL (ref 0–0.5)
EOSINOPHIL NFR BLD: 2.1 % (ref 0–8)
ERYTHROCYTE [DISTWIDTH] IN BLOOD BY AUTOMATED COUNT: 15.5 % (ref 11.5–14.5)
EST. GFR  (NO RACE VARIABLE): 43.1 ML/MIN/1.73 M^2
GLUCOSE SERPL-MCNC: 97 MG/DL (ref 70–110)
HCT VFR BLD AUTO: 35.9 % (ref 37–48.5)
HGB BLD-MCNC: 10.8 G/DL (ref 12–16)
IMM GRANULOCYTES # BLD AUTO: 0.05 K/UL (ref 0–0.04)
IMM GRANULOCYTES NFR BLD AUTO: 0.8 % (ref 0–0.5)
LYMPHOCYTES # BLD AUTO: 1.9 K/UL (ref 1–4.8)
LYMPHOCYTES NFR BLD: 28.2 % (ref 18–48)
MCH RBC QN AUTO: 26.8 PG (ref 27–31)
MCHC RBC AUTO-ENTMCNC: 30.1 G/DL (ref 32–36)
MCV RBC AUTO: 89 FL (ref 82–98)
MONOCYTES # BLD AUTO: 1.3 K/UL (ref 0.3–1)
MONOCYTES NFR BLD: 20 % (ref 4–15)
NEUTROPHILS # BLD AUTO: 3.2 K/UL (ref 1.8–7.7)
NEUTROPHILS NFR BLD: 48.4 % (ref 38–73)
NRBC BLD-RTO: 0 /100 WBC
PLATELET # BLD AUTO: 252 K/UL (ref 150–450)
PMV BLD AUTO: 9.2 FL (ref 9.2–12.9)
POTASSIUM SERPL-SCNC: 4 MMOL/L (ref 3.5–5.1)
RBC # BLD AUTO: 4.03 M/UL (ref 4–5.4)
SODIUM SERPL-SCNC: 138 MMOL/L (ref 136–145)
WBC # BLD AUTO: 6.6 K/UL (ref 3.9–12.7)

## 2023-06-01 PROCEDURE — 99213 OFFICE O/P EST LOW 20 MIN: CPT | Mod: ,,, | Performed by: PHYSICIAN ASSISTANT

## 2023-06-01 PROCEDURE — 63600175 PHARM REV CODE 636 W HCPCS: Performed by: INTERNAL MEDICINE

## 2023-06-01 PROCEDURE — G0378 HOSPITAL OBSERVATION PER HR: HCPCS

## 2023-06-01 PROCEDURE — 11000001 HC ACUTE MED/SURG PRIVATE ROOM

## 2023-06-01 PROCEDURE — 90792 PSYCH DIAG EVAL W/MED SRVCS: CPT | Mod: AF,HB,, | Performed by: PSYCHIATRY & NEUROLOGY

## 2023-06-01 PROCEDURE — 99232 SBSQ HOSP IP/OBS MODERATE 35: CPT | Mod: ,,, | Performed by: INTERNAL MEDICINE

## 2023-06-01 PROCEDURE — 36415 COLL VENOUS BLD VENIPUNCTURE: CPT | Performed by: STUDENT IN AN ORGANIZED HEALTH CARE EDUCATION/TRAINING PROGRAM

## 2023-06-01 PROCEDURE — 99213 PR OFFICE/OUTPT VISIT, EST, LEVL III, 20-29 MIN: ICD-10-PCS | Mod: ,,, | Performed by: PHYSICIAN ASSISTANT

## 2023-06-01 PROCEDURE — 25000003 PHARM REV CODE 250: Performed by: STUDENT IN AN ORGANIZED HEALTH CARE EDUCATION/TRAINING PROGRAM

## 2023-06-01 PROCEDURE — 96372 THER/PROPH/DIAG INJ SC/IM: CPT | Performed by: STUDENT IN AN ORGANIZED HEALTH CARE EDUCATION/TRAINING PROGRAM

## 2023-06-01 PROCEDURE — 90792 PR PSYCHIATRIC DIAGNOSTIC EVALUATION W/MEDICAL SERVICES: ICD-10-PCS | Mod: AF,HB,, | Performed by: PSYCHIATRY & NEUROLOGY

## 2023-06-01 PROCEDURE — 85025 COMPLETE CBC W/AUTO DIFF WBC: CPT | Performed by: STUDENT IN AN ORGANIZED HEALTH CARE EDUCATION/TRAINING PROGRAM

## 2023-06-01 PROCEDURE — 63600175 PHARM REV CODE 636 W HCPCS: Performed by: STUDENT IN AN ORGANIZED HEALTH CARE EDUCATION/TRAINING PROGRAM

## 2023-06-01 PROCEDURE — 80048 BASIC METABOLIC PNL TOTAL CA: CPT | Performed by: STUDENT IN AN ORGANIZED HEALTH CARE EDUCATION/TRAINING PROGRAM

## 2023-06-01 PROCEDURE — 99232 PR SUBSEQUENT HOSPITAL CARE,LEVL II: ICD-10-PCS | Mod: ,,, | Performed by: INTERNAL MEDICINE

## 2023-06-01 PROCEDURE — 25000003 PHARM REV CODE 250: Performed by: INTERNAL MEDICINE

## 2023-06-01 RX ORDER — MIRTAZAPINE 15 MG/1
15 TABLET, FILM COATED ORAL NIGHTLY
Status: DISCONTINUED | OUTPATIENT
Start: 2023-06-01 | End: 2023-06-02

## 2023-06-01 RX ADMIN — MORPHINE SULFATE 2 MG: 2 INJECTION, SOLUTION INTRAMUSCULAR; INTRAVENOUS at 10:06

## 2023-06-01 RX ADMIN — MEROPENEM 2 G: 1 INJECTION INTRAVENOUS at 08:06

## 2023-06-01 RX ADMIN — ENOXAPARIN SODIUM 40 MG: 40 INJECTION SUBCUTANEOUS at 05:06

## 2023-06-01 RX ADMIN — MEROPENEM 2 G: 1 INJECTION INTRAVENOUS at 09:06

## 2023-06-01 RX ADMIN — MIRTAZAPINE 15 MG: 15 TABLET, FILM COATED ORAL at 09:06

## 2023-06-01 RX ADMIN — TAMSULOSIN HYDROCHLORIDE 0.4 MG: 0.4 CAPSULE ORAL at 08:06

## 2023-06-01 RX ADMIN — GABAPENTIN 200 MG: 100 CAPSULE ORAL at 09:06

## 2023-06-01 NOTE — SUBJECTIVE & OBJECTIVE
Interval History: Patient lying in bed, no acute distress. No acute events overnight. Patient reports fatigue improving. Also notes continued bilateral flank pain around nephrostomy tube sites. Per urology, given good renal function, no indication for inpatient placement of right nephrostomy tube for the mild right hyrdrouteronephrosis.    Review of Systems   Constitutional:  Positive for activity change and fatigue. Negative for chills and fever.   HENT:  Negative for congestion and trouble swallowing.    Eyes:  Negative for visual disturbance.   Respiratory:  Negative for cough and shortness of breath.    Cardiovascular:  Negative for chest pain and leg swelling.   Gastrointestinal:  Negative for abdominal distention, abdominal pain, nausea and vomiting.   Endocrine: Negative for cold intolerance, heat intolerance, polydipsia and polyuria.   Genitourinary:  Positive for flank pain. Negative for difficulty urinating and dysuria.   Musculoskeletal:  Negative for back pain and myalgias.   Skin:  Negative for rash and wound.   Neurological:  Positive for weakness. Negative for dizziness and light-headedness.   Hematological:  Negative for adenopathy. Does not bruise/bleed easily.   Psychiatric/Behavioral:  Negative for confusion and sleep disturbance.    Objective:     Vital Signs (Most Recent):  Temp: 97 °F (36.1 °C) (05/31/23 1958)  Pulse: 87 (05/31/23 1958)  Resp: 18 (05/31/23 1958)  BP: (!) 112/56 (05/31/23 1958)  SpO2: 97 % (05/31/23 1958) Vital Signs (24h Range):  Temp:  [97 °F (36.1 °C)-98.4 °F (36.9 °C)] 97 °F (36.1 °C)  Pulse:  [82-88] 87  Resp:  [16-20] 18  SpO2:  [95 %-100 %] 97 %  BP: (104-117)/(55-59) 112/56     Weight: 83 kg (183 lb)  Body mass index is 27.02 kg/m².    Intake/Output Summary (Last 24 hours) at 5/31/2023 211  Last data filed at 5/31/2023 0519  Gross per 24 hour   Intake 1339 ml   Output 450 ml   Net 889 ml         Physical Exam  Constitutional:       Appearance: She is well-developed.    HENT:      Head: Normocephalic and atraumatic.   Eyes:      General: No scleral icterus.     Pupils: Pupils are equal, round, and reactive to light.   Neck:      Vascular: No JVD.   Cardiovascular:      Rate and Rhythm: Normal rate and regular rhythm.      Heart sounds: No murmur heard.    No friction rub. No gallop.   Pulmonary:      Effort: Pulmonary effort is normal. No respiratory distress.      Breath sounds: Normal breath sounds. No wheezing or rales.   Abdominal:      General: Bowel sounds are normal. There is no distension.      Palpations: Abdomen is soft.      Tenderness: There is no abdominal tenderness. There is no guarding or rebound.      Comments: Ileostomy tube with normal output   Genitourinary:     Comments: Left nephrostomy tube with normal output   Musculoskeletal:         General: No deformity. Normal range of motion.      Cervical back: Neck supple.   Lymphadenopathy:      Cervical: No cervical adenopathy.   Skin:     General: Skin is warm and dry.      Capillary Refill: Capillary refill takes less than 2 seconds.      Findings: No erythema or rash.   Neurological:      Mental Status: She is alert and oriented to person, place, and time.      Cranial Nerves: No cranial nerve deficit.      Sensory: No sensory deficit.   Psychiatric:         Mood and Affect: Mood normal.           Significant Labs: A1C: No results for input(s): HGBA1C in the last 4320 hours.  Blood Culture:   Recent Labs   Lab 05/30/23  1845 05/30/23  1900   LABBLOO No Growth to date  No Growth to date No Growth to date  No Growth to date     CBC:   Recent Labs   Lab 05/30/23  1855 05/31/23  0510   WBC 11.68 10.32   HGB 12.3 11.3*   HCT 40.2 37.3    240     CMP:   Recent Labs   Lab 05/30/23  1855 05/31/23  0510    142   K 4.4 3.8   * 114*   CO2 17* 18*   GLU 92 91   BUN 31* 24*   CREATININE 1.7* 1.4   CALCIUM 10.0 9.4   PROT 8.1  --    ALBUMIN 3.3*  --    BILITOT 0.2  --    ALKPHOS 156*  --    AST 15  --     ALT 15  --    ANIONGAP 9 10     Urine Studies:   Recent Labs   Lab 05/30/23 2107   COLORU Yellow   APPEARANCEUA Hazy*   PHUR 6.0   SPECGRAV 1.015   PROTEINUA 1+*   GLUCUA Negative   KETONESU Negative   BILIRUBINUA Negative   OCCULTUA Negative   NITRITE Positive*   LEUKOCYTESUR 3+*   RBCUA 7*   WBCUA >100*   BACTERIA Many*   HYALINECASTS 4*       Microbiology Results (last 7 days)       Procedure Component Value Units Date/Time    Blood culture x two cultures. Draw prior to antibiotics. [[id number]] Collected: 05/30/23 1845    Order Status: Completed Specimen: Blood from Peripheral, Wrist, Right Updated: 05/31/23 2022     Blood Culture, Routine No Growth to date      No Growth to date    Narrative:      Aerobic and anaerobic    Blood culture x two cultures. Draw prior to antibiotics. [[id number]] Collected: 05/30/23 1900    Order Status: Completed Specimen: Blood from Peripheral, Wrist, Left Updated: 05/31/23 2022     Blood Culture, Routine No Growth to date      No Growth to date    Narrative:      Aerobic and anaerobic    Urine culture [[id number]] Collected: 05/30/23 2107    Order Status: No result Specimen: Urine Updated: 05/30/23 2158              Significant Imaging: I have reviewed all pertinent imaging results/findings within the past 24 hours.   fever

## 2023-06-01 NOTE — PROGRESS NOTES
[facility] Medicine  Progress Note    Patient Name: Edita Riley  MRN: [id number]  Patient Class: OP- Observation   Admission Date: 5/30/2023  Length of Stay: 0 days  Attending Physician: Dioni Brush MD  Primary Care Provider: Primary Doctor No        Subjective:     Principal Problem:UTI (urinary tract infection)        HPI:  58 year old female with Hx of distal ureteral strictures due to XRT for cervical cancer s/p transverse colon conduit and B/L nephrostomy tubes complicated by MDR infections who presents to the ED for UTI. Patient was discharged last month after presenting with recurrent UTI (E. Faecium VRE). History limited form patient. She had undergone right PCN drain removal and stone extraction with drain replacement. Completed a course of Ertapenem 1 g IV daily for 10 days and Linezolid and was discharged on Bactrim after ID evaluation. Patient denies any fever, chills, nausea, vomiting, hematemesis, cough, chest pain, palpitations, shortness of breath, abdominal pain/distension, changes in bowel or urinary habits, no hematochezia or melena.      Overview/Hospital Course:  No notes on file    Interval History: Patient lying in bed, no acute distress. No acute events overnight. Patient reports fatigue improving. Also notes continued bilateral flank pain around nephrostomy tube sites. Per urology, given good renal function, no indication for inpatient placement of right nephrostomy tube for the mild right hyrdrouteronephrosis.    Review of Systems   Constitutional:  Positive for activity change and fatigue. Negative for chills and fever.   HENT:  Negative for congestion and trouble swallowing.    Eyes:  Negative for visual disturbance.   Respiratory:  Negative for cough and shortness of breath.    Cardiovascular:  Negative for chest pain and leg swelling.   Gastrointestinal:  Negative for abdominal distention, abdominal pain, nausea and vomiting.   Endocrine: Negative for  cold intolerance, heat intolerance, polydipsia and polyuria.   Genitourinary:  Positive for flank pain. Negative for difficulty urinating and dysuria.   Musculoskeletal:  Negative for back pain and myalgias.   Skin:  Negative for rash and wound.   Neurological:  Positive for weakness. Negative for dizziness and light-headedness.   Hematological:  Negative for adenopathy. Does not bruise/bleed easily.   Psychiatric/Behavioral:  Negative for confusion and sleep disturbance.    Objective:     Vital Signs (Most Recent):  Temp: 97 °F (36.1 °C) (05/31/23 1958)  Pulse: 87 (05/31/23 1958)  Resp: 18 (05/31/23 1958)  BP: (!) 112/56 (05/31/23 1958)  SpO2: 97 % (05/31/23 1958) Vital Signs (24h Range):  Temp:  [97 °F (36.1 °C)-98.4 °F (36.9 °C)] 97 °F (36.1 °C)  Pulse:  [82-88] 87  Resp:  [16-20] 18  SpO2:  [95 %-100 %] 97 %  BP: (104-117)/(55-59) 112/56     Weight: 83 kg (183 lb)  Body mass index is 27.02 kg/m².    Intake/Output Summary (Last 24 hours) at 5/31/2023 2114  Last data filed at 5/31/2023 0519  Gross per 24 hour   Intake 1339 ml   Output 450 ml   Net 889 ml         Physical Exam  Constitutional:       Appearance: She is well-developed.   HENT:      Head: Normocephalic and atraumatic.   Eyes:      General: No scleral icterus.     Pupils: Pupils are equal, round, and reactive to light.   Neck:      Vascular: No JVD.   Cardiovascular:      Rate and Rhythm: Normal rate and regular rhythm.      Heart sounds: No murmur heard.    No friction rub. No gallop.   Pulmonary:      Effort: Pulmonary effort is normal. No respiratory distress.      Breath sounds: Normal breath sounds. No wheezing or rales.   Abdominal:      General: Bowel sounds are normal. There is no distension.      Palpations: Abdomen is soft.      Tenderness: There is no abdominal tenderness. There is no guarding or rebound.      Comments: Ileostomy tube with normal output   Genitourinary:     Comments: Left nephrostomy tube with normal output    Musculoskeletal:         General: No deformity. Normal range of motion.      Cervical back: Neck supple.   Lymphadenopathy:      Cervical: No cervical adenopathy.   Skin:     General: Skin is warm and dry.      Capillary Refill: Capillary refill takes less than 2 seconds.      Findings: No erythema or rash.   Neurological:      Mental Status: She is alert and oriented to person, place, and time.      Cranial Nerves: No cranial nerve deficit.      Sensory: No sensory deficit.   Psychiatric:         Mood and Affect: Mood normal.           Significant Labs: A1C: No results for input(s): HGBA1C in the last 4320 hours.  Blood Culture:   Recent Labs   Lab 05/30/23 1845 05/30/23  1900   LABBLOO No Growth to date  No Growth to date No Growth to date  No Growth to date     CBC:   Recent Labs   Lab 05/30/23 1855 05/31/23  0510   WBC 11.68 10.32   HGB 12.3 11.3*   HCT 40.2 37.3    240     CMP:   Recent Labs   Lab 05/30/23 1855 05/31/23  0510    142   K 4.4 3.8   * 114*   CO2 17* 18*   GLU 92 91   BUN 31* 24*   CREATININE 1.7* 1.4   CALCIUM 10.0 9.4   PROT 8.1  --    ALBUMIN 3.3*  --    BILITOT 0.2  --    ALKPHOS 156*  --    AST 15  --    ALT 15  --    ANIONGAP 9 10     Urine Studies:   Recent Labs   Lab 05/30/23 2107   COLORU Yellow   APPEARANCEUA Hazy*   PHUR 6.0   SPECGRAV 1.015   PROTEINUA 1+*   GLUCUA Negative   KETONESU Negative   BILIRUBINUA Negative   OCCULTUA Negative   NITRITE Positive*   LEUKOCYTESUR 3+*   RBCUA 7*   WBCUA >100*   BACTERIA Many*   HYALINECASTS 4*       Microbiology Results (last 7 days)       Procedure Component Value Units Date/Time    Blood culture x two cultures. Draw prior to antibiotics. [[id number]] Collected: 05/30/23 1845    Order Status: Completed Specimen: Blood from Peripheral, Wrist, Right Updated: 05/31/23 2022     Blood Culture, Routine No Growth to date      No Growth to date    Narrative:      Aerobic and anaerobic    Blood culture x two cultures. Draw prior  to antibiotics. [[id number]] Collected: 05/30/23 1900    Order Status: Completed Specimen: Blood from Peripheral, Wrist, Left Updated: 05/31/23 2022     Blood Culture, Routine No Growth to date      No Growth to date    Narrative:      Aerobic and anaerobic    Urine culture [[id number]] Collected: 05/30/23 2107    Order Status: No result Specimen: Urine Updated: 05/30/23 2158              Significant Imaging: I have reviewed all pertinent imaging results/findings within the past 24 hours.      Assessment/Plan:      * UTI (urinary tract infection)  Bilateral flank pain  - Recurrent Urinary tact infection related to nephrostomy tube and chronic uretral strictures  - Follow up urine cultures and blood cultures  - Urine culture 4/10 growing klebsiella and enterococcus.  - followup urine cultures   - prn percocet and prn IV morphine   - ID consulted. apprec recs  -- continue meropenem     Nephrostomy status  - S/p Bilateral Nephrostomy tubes; R. Removed after recent complication and remains with L.Nephrostomy  - Continue with Flomax 0.4 mg, daily  - Follow up renal ultrasound  - per urology, no need for right nephrostomy tube placement given normal renal function. Outpatient urology followup    Ileostomy in place  - S/p Transverse colon conduit 2020 complicate by bowel perforation requiring hemicolectomy & ileostomy  - Follow up outpatient CRS    ODESSA (acute kidney injury)  ODESSA on CKD  - Scr on admit, 1.7 --> 1.4  - Monitor renal function  - Avoid Nephrotoxic agents  - Monitor urine output   - Follow up Renal U/S  - IMPROVING       VTE Risk Mitigation (From admission, onward)         Ordered     enoxaparin injection 40 mg  Daily         05/30/23 2228     IP VTE HIGH RISK PATIENT  Once         05/30/23 2228     Place sequential compression device  Until discontinued         05/30/23 2228                Discharge Planning   OK: 6/1/2023     Code Status: Full Code   Is the patient medically ready for discharge?: No    Reason  for patient still in hospital (select all that apply): Patient unstable, Patient trending condition, Laboratory test and Treatment  Discharge Plan A: Psychiatric hospital          Time spent in care of patient: > 35 minutes           Dioni Brush MD  Department of Hospital Medicine   [facility]

## 2023-06-01 NOTE — ASSESSMENT & PLAN NOTE
ODESSA on CKD  - Scr on admit, 1.7 --> 1.4  - Monitor renal function  - Avoid Nephrotoxic agents  - Monitor urine output   - Follow up Renal U/S  - IMPROVING

## 2023-06-01 NOTE — PROGRESS NOTES
[facility]  Infectious Disease  Progress Note    Patient Name: Edita Riley  MRN: [id number]  Admission Date: 5/30/2023  Length of Stay: 0 days  Attending Physician: Dioni Brush MD  Primary Care Provider: Primary Doctor No    Isolation Status: No active isolations  Assessment/Plan:      Renal/  * UTI (urinary tract infection)  57 y/o female hx of ureteral strictures due to XRT for cervical cancer s/p transverse colon conduit, bilateral nephrostomy tubes c/b recurrent pyelonephritis. She underwent right PCN drain exchange and stone extraction on 4/21/23, completed 10 day course of abx. She is admitted for recurrent UTIs, failed po bactrim. ID consulted for abx mgmt     Prior CT with mild right-sided hydroureteronephrosis with mild inflammatory changes about the right ureter, similar to prior.Left nephrostomy tube is in stable position.  No left-sided hydronephrosis. U/s kidneys studies today.    Recommendations  1. Continue meropenem for now, will de-escalate based on urine studies.   2. Discussed with urology, kidney function stable, can hold off on nephrostomy tube placement at this time. May undergo nephrostogram and a retrograde pyelogram on 6/6 with Dr. Balbuena   3. Blood cultures here NGTD.   4. Discussed with ID staff. Discussed with primary team            Thank you for your consult. I will follow-up with patient. Please contact us if you have any additional questions.    Elizabeth Ron PA-C  Infectious Disease  [facility]    Subjective:     Principal Problem:UTI (urinary tract infection)    HPI: 57 y/o female with hx of cervical ca s/p XRT complicated by ureteral strictures and hydronephrosis requiring stent placements. Ultimately,  she failed the stents and a transverse colon conduit was performed.  This was complicated by colon perforation distal to the anastamosis.  She has been stable for a number of years but had to have bilateral nephrostomy tubes the right side for  stones, the left due to concern for ureteroenteric anastamotic stricture.     She is followed by urology closely, she was last admitted 4/2023 for recurrent UTIs with ESBL Klebsiella and VRE. She underwent right PCN drain exchange and stone extraction on 4/21/23. She completed a 10 day course of abx for pyelonephritis.     She last saw urology on 5/10, planned to have a loopogram and antegrade nephrostogram to characterize the length of the stricture in planning for revision of the anastamosis.    Unfortunately she was seen in the ED following day for suicidal ideation after death of her daughter. She has been at [facility]. She had two ED visits for ostomy bag leakage requiring exchanges. She is now admitted for recurrent UTI with ESBL klebsiella without improvement with po bactrim. She is currently on meropenem. ID consulted for IV abx therapy.     Pt remained afebrile. Stable. No leukocytosis. ODESSA on admission. UA with many bacteruria. Blood cultures NGTD. Urine cultures pending. Prior CT studies Mild right-sided hydroureteronephrosis with mild inflammatory changes about the right ureter, similar to prior.Left nephrostomy tube is in stable position.  No left-sided hydronephrosis. U/s kidneys today.      Pt feels improved today. Has some bilateral flank pain. Denies having any fever chills or night sweats    Interval History:   Urine cultures +GNR  Discussed with urology, kidney function remains stable. Kaz will get nephrogram with urology as scheduled on 6/8  Flank pain improved     Review of Systems   Unable to perform ROS: Psychiatric disorder   Objective:     Vital Signs (Most Recent):  Temp: 97.7 °F (36.5 °C) (06/01/23 1131)  Pulse: 87 (06/01/23 1131)  Resp: 19 (06/01/23 1131)  BP: 109/67 (06/01/23 1131)  SpO2: 97 % (06/01/23 1131) Vital Signs (24h Range):  Temp:  [97 °F (36.1 °C)-98.7 °F (37.1 °C)] 97.7 °F (36.5 °C)  Pulse:  [84-91] 87  Resp:  [17-19] 19  SpO2:  [96 %-99 %] 97 %  BP:  ()/(51-67) 109/67     Weight: 83 kg (183 lb)  Body mass index is 27.02 kg/m².    Estimated Creatinine Clearance: 49.2 mL/min (based on SCr of 1.4 mg/dL).     Physical Exam  Vitals and nursing note reviewed.   Constitutional:       General: She is not in acute distress.     Appearance: She is well-developed. She is not diaphoretic.   HENT:      Head: Normocephalic and atraumatic.   Eyes:      Pupils: Pupils are equal, round, and reactive to light.   Cardiovascular:      Rate and Rhythm: Normal rate and regular rhythm.      Heart sounds: Normal heart sounds. No murmur heard.    No friction rub. No gallop.   Pulmonary:      Effort: Pulmonary effort is normal. No respiratory distress.      Breath sounds: Normal breath sounds. No wheezing or rales.   Chest:      Chest wall: No tenderness.   Abdominal:      General: Bowel sounds are normal. There is no distension.      Palpations: Abdomen is soft. There is no mass.      Tenderness: There is no abdominal tenderness. There is no guarding or rebound.      Hernia: No hernia is present.      Comments: Ileostomy site and conduit site c/d/I   Left nephrostomy tube stable    Musculoskeletal:         General: No tenderness or deformity. Normal range of motion.      Cervical back: Normal range of motion and neck supple.   Skin:     General: Skin is warm and dry.      Coloration: Skin is not pale.      Findings: No erythema.   Neurological:      Mental Status: She is alert and oriented to person, place, and time.      Cranial Nerves: No cranial nerve deficit.      Coordination: Coordination normal.   Psychiatric:         Behavior: Behavior normal.         Thought Content: Thought content normal.        Significant Labs: All pertinent labs within the past 24 hours have been reviewed.    Significant Imaging: I have reviewed all pertinent imaging results/findings within the past 24 hours.

## 2023-06-01 NOTE — ASSESSMENT & PLAN NOTE
Bilateral flank pain  - Recurrent Urinary tact infection related to nephrostomy tube and chronic uretral strictures  - Follow up urine cultures and blood cultures  - Urine culture 4/10 growing klebsiella and enterococcus.  - followup urine cultures   - prn percocet and prn IV morphine   - ID consulted. apprec recs  -- continue meropenem

## 2023-06-01 NOTE — PLAN OF CARE
Changed Ileostomy bag today due to bag leaking. Pt also went down for an Ultrasound of Kidneys as well. Pt is still CEC'd. Otherwise no major events during day shift.   Problem: Violence Risk or Actual  Goal: Anger and Impulse Control  Outcome: Ongoing, Progressing     Problem: Adult Inpatient Plan of Care  Goal: Plan of Care Review  Outcome: Ongoing, Progressing  Goal: Patient-Specific Goal (Individualized)  Outcome: Ongoing, Progressing  Goal: Absence of Hospital-Acquired Illness or Injury  Outcome: Ongoing, Progressing  Goal: Optimal Comfort and Wellbeing  Outcome: Ongoing, Progressing  Goal: Readiness for Transition of Care  Outcome: Ongoing, Progressing     Problem: Fluid and Electrolyte Imbalance (Acute Kidney Injury/Impairment)  Goal: Fluid and Electrolyte Balance  Outcome: Ongoing, Progressing     Problem: Oral Intake Inadequate (Acute Kidney Injury/Impairment)  Goal: Optimal Nutrition Intake  Outcome: Ongoing, Progressing     Problem: Renal Function Impairment (Acute Kidney Injury/Impairment)  Goal: Effective Renal Function  Outcome: Ongoing, Progressing

## 2023-06-01 NOTE — ASSESSMENT & PLAN NOTE
59 y/o female hx of ureteral strictures due to XRT for cervical cancer s/p transverse colon conduit, bilateral nephrostomy tubes c/b recurrent pyelonephritis. She underwent right PCN drain exchange and stone extraction on 4/21/23, completed 10 day course of abx. She is admitted for recurrent UTIs, failed po bactrim. ID consulted for abx mgmt     Prior CT with mild right-sided hydroureteronephrosis with mild inflammatory changes about the right ureter, similar to prior.Left nephrostomy tube is in stable position.  No left-sided hydronephrosis. U/s kidneys studies today.    Recommendations  1. Continue meropenem for now, will de-escalate based on urine studies.   2. Discussed with urology, kidney function stable, can hold off on nephrostomy tube placement at this time. May undergo nephrostogram and a retrograde pyelogram on 6/6 with Dr. Balbuena   3. Blood cultures here NGTD.   4. Discussed with ID staff. Discussed with primary team

## 2023-06-01 NOTE — SUBJECTIVE & OBJECTIVE
Interval History:   Urine cultures +GNR  Discussed with urology, kidney function remains stable. Kaz will get nephrogram with urology as scheduled on 6/8  Flank pain improved     Review of Systems   Unable to perform ROS: Psychiatric disorder   Objective:     Vital Signs (Most Recent):  Temp: 97.7 °F (36.5 °C) (06/01/23 1131)  Pulse: 87 (06/01/23 1131)  Resp: 19 (06/01/23 1131)  BP: 109/67 (06/01/23 1131)  SpO2: 97 % (06/01/23 1131) Vital Signs (24h Range):  Temp:  [97 °F (36.1 °C)-98.7 °F (37.1 °C)] 97.7 °F (36.5 °C)  Pulse:  [84-91] 87  Resp:  [17-19] 19  SpO2:  [96 %-99 %] 97 %  BP: ()/(51-67) 109/67     Weight: 83 kg (183 lb)  Body mass index is 27.02 kg/m².    Estimated Creatinine Clearance: 49.2 mL/min (based on SCr of 1.4 mg/dL).     Physical Exam  Vitals and nursing note reviewed.   Constitutional:       General: She is not in acute distress.     Appearance: She is well-developed. She is not diaphoretic.   HENT:      Head: Normocephalic and atraumatic.   Eyes:      Pupils: Pupils are equal, round, and reactive to light.   Cardiovascular:      Rate and Rhythm: Normal rate and regular rhythm.      Heart sounds: Normal heart sounds. No murmur heard.    No friction rub. No gallop.   Pulmonary:      Effort: Pulmonary effort is normal. No respiratory distress.      Breath sounds: Normal breath sounds. No wheezing or rales.   Chest:      Chest wall: No tenderness.   Abdominal:      General: Bowel sounds are normal. There is no distension.      Palpations: Abdomen is soft. There is no mass.      Tenderness: There is no abdominal tenderness. There is no guarding or rebound.      Hernia: No hernia is present.      Comments: Ileostomy site and conduit site c/d/I   Left nephrostomy tube stable    Musculoskeletal:         General: No tenderness or deformity. Normal range of motion.      Cervical back: Normal range of motion and neck supple.   Skin:     General: Skin is warm and dry.      Coloration: Skin is not  pale.      Findings: No erythema.   Neurological:      Mental Status: She is alert and oriented to person, place, and time.      Cranial Nerves: No cranial nerve deficit.      Coordination: Coordination normal.   Psychiatric:         Behavior: Behavior normal.         Thought Content: Thought content normal.        Significant Labs: All pertinent labs within the past 24 hours have been reviewed.    Significant Imaging: I have reviewed all pertinent imaging results/findings within the past 24 hours.

## 2023-06-01 NOTE — CONSULTS
"CONSULTATION LIAISON PSYCHIATRY INITIAL EVALUATION    Patient Name: Edita Riley  MRN: [id number]  Patient Class: OP- Observation  Admission Date: 2023  Attending Physician: Dioni Brush MD      HPI:   Edita Riley is a 58 y.o. female with past psychiatric history of depression & past pertinent medical history of cervical cancer s/p transverse colon conduit, bilateral nephrostomy tubes c/b recurrent pyelonephritis who presents to the ED/admitted to the hospital for UTI (urinary tract infection). She was transferred from [facility] where she is admitted under [facility] for depression and suicidal ideation.     Psychiatry consulted for "h/o depression admitted for recurrent UTI. Recent voluntary admission to [facility] for SI after her daughter committed suicide on 2023. PEC orderd. Consult for management of depression and SI."    Chart re viewed. Patient interviewed. On psych exam, patient resting in bed. States she has been going through a lot. Endorses worsening depression since earlier this month when her daughter . She has been at [facility] receiving psychiatrist help on a voluntary basis. She has been transferred to [facility] multiple times for ongoing medical problems. She reports back and abdominal pain related to kidnies, recurrent UTIs. Initially states she is not taking any medications for depression but then says she is unaware of which psychiatric medications she is taking. She notes difficulty sleeping due to back pain. Denies problems with appetite. Endorses low energy. Has depressed mood with feelings of hopelessness. She has suicidal ideation but does not divulge plan even when asked. She denies HI/AVH. Denies alcohol or illicit substance use. Denies signs/symptoms or history of taniya. Denies psychosis but does endorse hearing the voice of her recently  daughter. She would like to continue receiving psychiatric care when she is medically " stable. She is interested in psychology/supportive psychotherapy while she is here.       Medical Review of Systems:  +back, abdominal pain  A comprehensive review of systems was negative.    Psychiatric Review of Systems (is patient experiencing or having changes in):  sleep: yes - decreased  appetite: no  weight: no  energy/anergy: yes - decreased  interest/pleasure/anhedonia: yes - decreased  somatic symptoms: no  libido: no  anxiety/panic: no  guilty/hopelessness: yes  concentration: no  Joslyn:no  Psychosis: yes - AH  Trauma: yes  S.I.B.s/risky behavior: yes - Si with plan, unclear intent    Past Psychiatric History:  Previous Medication Trials: yes, prozac, remeron ()  Previous Psychiatric Hospitalizations:yes[facility]   Previous Suicide Attempts: no  History of Violence: no  Outpatient Psychiatrist: none currently    Family Psychiatric History: yes  Daughter - depression, potential suicide ()  Daughter - depression ( )    Substance Abuse History (with emphasis over the last 12 months):  Recreational Drugs:  denies  Use of Alcohol: denied  Tobacco Use:no  Rehab History:no    Social History:  Marital Status:   Children: 2 - twin daughters, both   Employment Status/Info:  currently unemployed, housewife,  was a marine, currently retired  :no  Education: high school diploma/GED  Special Ed: no  Housing Status: with spouse  Access to gun: yes  Psychosocial Stressors: family and health  Functioning Relationships: good relationship with spouse or significant other    Legal History:  Past Charges/Incarcerations: no  Pending charges:no    Mental Status Exam:  General Appearance:  appears stated age, fair grooming, casually dressed in hospital gown  Behavior: cooperative, friendly, appropriate eye-contact  Involuntary Movements and Motor Activity: no abnormal involuntary movements noted; no tics, no tremors,  "no akathisia, no dystonia, no evidence of tardive dyskinesia; no psychomotor agitation or retardation  Gait and Station: unable to assess - patient lying down or seated  Speech and Language: intact; normal rate, rhythm, volume and dysphoric tone; conversational, spontaneous, and coherent; speaks and understands English proficiently and fluently; repeats words and phrases, no word finding difficulties are noted  Mood: "sad" + depression  Affect: appropriate to situation and context, mood-congruent, dysphoric, tearful  Thought Process and Associations: linear, goal-directed, organized  Thought Content and Perceptions::  +SI with plan, unclear intent; denies HI; +AH of  daughter; denies VH  Sensorium and Orientation: intact; alert with clear sensorium; oriented fully to person, place, time and situation  Recent and Remote Memory: grossly intact, able to recall relevant and salient information from the recent and remote past  Attention and Concentration: intact, attentive to conversation, able to spell WORLD forwards and backwards  Fund of Knowledge: grossly intact, used appropriate vocabulary and demonstrated an awareness of current events, consistent with educational level achieved  Insight: demonstrates awareness of illness + situation  Judgment: compliant with health provider's recommendations and instructions    CAM ICU positive? no      ASSESSMENT & RECOMMENDATIONS     MDD severe, complicated bereavement  PSYCH MEDICATIONS  Scheduled  - Remeron 15mg qHS, will increase to 30mg as tolerated  - primary to confirm with [facility] if patient was taking remeron at [facility]SH    RISK ASSESSMENT  NEEDS PEC because patient is in imminent danger of hurting self or others and is gravely disabled. & NEEDS 1:1 sitter  PEC/CEC   @ 6:20pm    FOLLOW UP  Will follow up while in house    DISPOSITION - once medically cleared:   Seek involuntary inpatient psychiatric admission for stabilization of acute psychiatric symptoms " and a safe disposition plan is enacted. The patient  was informed that the patient will be transferred to an inpatient unit per ED/primary placement team. If patient remains agreeable, may be able to convert to [facility]     Please contact ON CALL psychiatry service (24/7) for any acute issues that may arise.    Dr. Jessy Becker   Psychiatry  [facility]  6/1/2023 11:54 AM        --------------------------------------------------------------------------------------------------------------------------------------------------------------------------------------------------------------------------------------    CONTINUED HISTORY & OBJECTIVE clinical data & findings reviewed and noted for above decision making    Past Medical/Surgical History:   Past Medical History:   Diagnosis Date    Abnormal mammogram 08/25/2020    Acute blood loss anemia     Acute deep vein thrombosis (DVT) of lower extremity 12/09/2020    Advance care planning 04/30/2021    Anemia due to chronic blood loss     Anemia due to chronic kidney disease     Anxiety     Bilateral ureteral obstruction 9/11/2020    Cardiovascular event risk -- low 09/14/2015    ASCVD 10-year risk 1.9% (with optimal risk factors 1.3%) as of 9/14/2015     Cervical cancer 2014    Chronic back pain     Colostomy care     Deep vein thrombosis     Depression     Diarrhea due to malabsorption 11/14/2018    Difficult intubation     Disorder of kidney and ureter     DVT of lower extremity, bilateral 11/04/2020    Emphysema of lung 4/10/2023    Fibromyalgia     Fungemia 09/27/2020    Generalized abdominal pain 08/25/2020    GERD (gastroesophageal reflux disease)     Hemifacial spasm 09/16/2015    Hiatal hernia 2014    History of cervical cancer 10/11/2018    Hx of psychiatric care     Cymbalta, trazodone    Hypertension     Hypomagnesemia 11/21/2018    Lactose intolerance     Metastatic squamous cell carcinoma to lymph node 10/11/2018    Neuropathy due to  chemotherapeutic drug 11/14/2018    Osteoarthritis of back     Peritonitis 09/22/2020    Pseudomonas urinary tract infection 04/21/2021    Psychiatric problem     Refusal of blood transfusions as patient is [employer]     Schatzki's ring 09/14/2015    Seen on outside EGD 05/2014, underwent esophageal dilatation. Bx were negative.     Seizures     Sleep stage dysfunction     Noted on PSG 06/2017; negative for obstructive sleep apnea     Stroke     Urinary tract infection associated with nephrostomy catheter 06/11/2020    Wound infection after surgery 09/24/2020     Past Surgical History:   Procedure Laterality Date    ANTEGRADE NEPHROSTOGRAPHY Bilateral 9/28/2020    Procedure: Nephrostogram - antegrade;  Surgeon: Leslie Balbuena MD;  Location: [facility] OR 1ST FLR;  Service: Urology;  Laterality: Bilateral;    ANTEGRADE NEPHROSTOGRAPHY Left 4/20/2021    Procedure: NEPHROSTOGRAM, ANTEGRADE;  Surgeon: Leslie Balbuena MD;  Location: [facility] OR 1ST FLR;  Service: Urology;  Laterality: Left;    ANTEGRADE NEPHROSTOGRAPHY Right 10/27/2022    Procedure: NEPHROSTOGRAM, ANTEGRADE;  Surgeon: Chirag Russ MD;  Location: [facility] OR 1ST FLR;  Service: Urology;  Laterality: Right;    ANTEGRADE NEPHROSTOGRAPHY Right 4/21/2023    Procedure: NEPHROSTOGRAM, ANTEGRADE;  Surgeon: Chirag Russ MD;  Location: [facility] OR 2ND FLR;  Service: Urology;  Laterality: Right;    BILATERAL OOPHORECTOMY  2015    CHOLECYSTECTOMY  11/09/2016    Done at [facility] showed chronic cholecystitis and gallstones    cold knife conization  2014    COLECTOMY, RIGHT  9/17/2020    Procedure: COLECTOMY, RIGHT Extended;  Surgeon: Hammad Reynolds MD;  Location: [facility] OR 2ND FLR;  Service: Colon and Rectal;;    COLONOSCOPY  2014    [facility]    COLONOSCOPY N/A 5/18/2018    Procedure: COLONOSCOPY;  Surgeon: Arden Gutiérrez MD;  Location: [facility] (4TH FLR);  Service: Endoscopy;  Laterality: N/A;    COLONOSCOPY N/A  7/28/2020    Procedure: COLONOSCOPY;  Surgeon: Hammad Reynolds MD;  Location: [facility] ENDO (4TH FLR);  Service: Colon and Rectal;  Laterality: N/A;  covid test [place] 7/25    CYSTOSCOPY WITH URETEROSCOPY, RETROGRADE PYELOGRAPHY, AND INSERTION OF STENT Bilateral 3/21/2020    Procedure: CYSTOSCOPY, WITH RETROGRADE PYELOGRAM,;  Surgeon: Leslie Balbuena MD;  Location: [facility] OR 1ST FLR;  Service: Urology;  Laterality: Bilateral;    DILATION OF NEPHROSTOMY TRACT Right 10/27/2022    Procedure: DILATION, NEPHROSTOMY TRACT;  Surgeon: Chirag Russ MD;  Location: [facility] OR 1ST FLR;  Service: Urology;  Laterality: Right;    ESOPHAGOGASTRODUODENOSCOPY  2014    ESOPHAGOGASTRODUODENOSCOPY  11/18/2020    ESOPHAGOGASTRODUODENOSCOPY N/A 11/18/2020    Procedure: ESOPHAGOGASTRODUODENOSCOPY (EGD);  Surgeon: Zenon Spencer MD;  Location: [facility];  Service: Endoscopy;  Laterality: N/A;    ESOPHAGOGASTRODUODENOSCOPY N/A 12/11/2020    Procedure: EGD (ESOPHAGOGASTRODUODENOSCOPY);  Surgeon: Juancho uMse MD;  Location: [facility] (2ND FLR);  Service: Endoscopy;  Laterality: N/A;    HYSTERECTOMY  2014    [facility] for cervical cancer    ILEOSTOMY  9/17/2020    Procedure: CREATION, ILEOSTOMY;  Surgeon: Hammad Reynolds MD;  Location: [facility] OR 2ND FLR;  Service: Colon and Rectal;;    LOOPOGRAM N/A 4/20/2021    Procedure: LOOPOGRAM;  Surgeon: Leslie Balbuena MD;  Location: [facility] OR 1ST FLR;  Service: Urology;  Laterality: N/A;    MOBILIZATION OF SPLENIC FLEXURE N/A 9/11/2020    Procedure: MOBILIZATION, COLONIC;  Surgeon: Hammad Reynolds MD;  Location: [facility] OR 2ND FLR;  Service: Colon and Rectal;  Laterality: N/A;    NEPHROSTOGRAPHY Bilateral 4/17/2021    Procedure: Nephrostogram;  Surgeon: Celeste Surgeon;  Location: [facility] CELESTE;  Service: Anesthesiology;  Laterality: Bilateral;    OOPHORECTOMY      PERCUTANEOUS NEPHROLITHOTOMY Right 4/21/2023    Procedure: NEPHROLITHOTOMY, PERCUTANEOUS;  Surgeon: Chirag Russ MD;  Location: [facility] OR [facility]  FLR;  Service: Urology;  Laterality: Right;  2.5 hrs    PERCUTANEOUS NEPHROSTOMY  4/21/2023    Procedure: CREATION, NEPHROSTOMY, PERCUTANEOUS with removal of existing nephrostomy tube;  Surgeon: Chirag Russ MD;  Location: [facility] OR 2ND FLR;  Service: Urology;;    PYELOSCOPY Right 10/27/2022    Procedure: PYELOSCOPY;  Surgeon: Chirag Russ MD;  Location: [facility] OR 1ST FLR;  Service: Urology;  Laterality: Right;    REPLACEMENT OF NEPHROSTOMY TUBE Right 10/27/2022    Procedure: REPLACEMENT, NEPHROSTOMY TUBE;  Surgeon: Chirag Russ MD;  Location: [facility] OR 1ST FLR;  Service: Urology;  Laterality: Right;    RETROPERITONEAL LYMPHADENECTOMY Right 9/17/2018    Procedure: LYMPHADENECTOMY, RETROPERITONEUM;  Surgeon: Sebas Reed MD;  Location: [facility] OR 2ND FLR;  Service: General;  Laterality: Right;  ILIAC    URETEROSCOPIC REMOVAL OF URETERIC CALCULUS Right 10/27/2022    Procedure: REMOVAL, CALCULUS, URETER, URETEROSCOPIC;  Surgeon: Chirag Russ MD;  [facility];  Service: Urology;  Laterality: Right;    URETEROSCOPY Right 10/27/2022    Procedure: URETEROSCOPY-ANTEGRADE;  Surgeon: Chirag Russ MD;  [facility];  Service: Urology;  Laterality: Right;       Current Medications:   Scheduled Meds:    enoxparin  40 mg Subcutaneous Daily    gabapentin  200 mg Oral QHS    meropenem (MERREM) IVPB  2 g Intravenous Q12H    tamsulosin  0.4 mg Oral Daily     PRN Meds: acetaminophen, morphine, oxyCODONE-acetaminophen, oxyCODONE-acetaminophen, sodium chloride 0.9%      Allergies:   Review of patient's allergies indicates:   Allergen Reactions    Bee sting [allergen ext-venom-honey bee]      Rash      Grass pollen-bermuda, standard      rash       Vitals  Vitals:    06/01/23 1131   BP: 109/67   Pulse: 87   Resp: 19   Temp: 97.7 °F (36.5 °C)       Labs/Imaging/Studies:  Recent Results (from the past 24 hour(s))   Basic Metabolic Panel    Collection Time: 06/01/23  5:19 AM   Result  Value Ref Range    Sodium 138 136 - 145 mmol/L    Potassium 4.0 3.5 - 5.1 mmol/L    Chloride 112 (H) 95 - 110 mmol/L    CO2 20 (L) 23 - 29 mmol/L    Glucose 97 70 - 110 mg/dL    BUN 21 (H) 6 - 20 mg/dL    Creatinine 1.4 0.5 - 1.4 mg/dL    Calcium 9.3 8.7 - 10.5 mg/dL    Anion Gap 6 (L) 8 - 16 mmol/L    eGFR 43.1 (A) >60 mL/min/1.73 m^2   CBC auto differential    Collection Time: 06/01/23  5:19 AM   Result Value Ref Range    WBC 6.60 3.90 - 12.70 K/uL    RBC 4.03 4.00 - 5.40 M/uL    Hemoglobin 10.8 (L) 12.0 - 16.0 g/dL    Hematocrit 35.9 (L) 37.0 - 48.5 %    MCV 89 82 - 98 fL    MCH 26.8 (L) 27.0 - 31.0 pg    MCHC 30.1 (L) 32.0 - 36.0 g/dL    RDW 15.5 (H) 11.5 - 14.5 %    Platelets 252 150 - 450 K/uL    MPV 9.2 9.2 - 12.9 fL    Immature Granulocytes 0.8 (H) 0.0 - 0.5 %    Gran # (ANC) 3.2 1.8 - 7.7 K/uL    Immature Grans (Abs) 0.05 (H) 0.00 - 0.04 K/uL    Lymph # 1.9 1.0 - 4.8 K/uL    Mono # 1.3 (H) 0.3 - 1.0 K/uL    Eos # 0.1 0.0 - 0.5 K/uL    Baso # 0.03 0.00 - 0.20 K/uL    nRBC 0 0 /100 WBC    Gran % 48.4 38.0 - 73.0 %    Lymph % 28.2 18.0 - 48.0 %    Mono % 20.0 (H) 4.0 - 15.0 %    Eosinophil % 2.1 0.0 - 8.0 %    Basophil % 0.5 0.0 - 1.9 %    Differential Method Automated      Imaging Results    None

## 2023-06-01 NOTE — ASSESSMENT & PLAN NOTE
59 y/o female hx of ureteral strictures due to XRT for cervical cancer s/p transverse colon conduit, bilateral nephrostomy tubes c/b recurrent pyelonephritis. She underwent right PCN drain exchange and stone extraction on 4/21/23, completed 10 day course of abx. She is admitted for recurrent UTIs, failed po bactrim. ID consulted for abx mgmt     Prior CT with mild right-sided hydroureteronephrosis with mild inflammatory changes about the right ureter, similar to prior.Left nephrostomy tube is in stable position.  No left-sided hydronephrosis.     Recommendations  1. D/c meropenem.start ertapenem as repeat urine cultures +Enterobacter. Will cover prior ESBL Klebsiella that grew  2. Anticipate 10 days of IV abx therapy . EOC 6/9/23   3. Discussed with urology, kidney function stable, can hold off on nephrostomy tube placement at this time. May undergo nephrostogram and a retrograde pyelogram on 6/6 with Dr. Balbuena   4. Blood cultures here NGTD.   5. Discussed with ID staff. Discussed with primary team and urology  6. Weekly cbc cmp while on ertapenem

## 2023-06-01 NOTE — ASSESSMENT & PLAN NOTE
- S/p Bilateral Nephrostomy tubes; R. Removed after recent complication and remains with L.Nephrostomy  - Continue with Flomax 0.4 mg, daily  - Follow up renal ultrasound  - per urology, no need for right nephrostomy tube placement given normal renal function. Outpatient urology followup

## 2023-06-02 PROBLEM — F33.1 MAJOR DEPRESSIVE DISORDER, RECURRENT EPISODE, MODERATE: Status: ACTIVE | Noted: 2023-06-02

## 2023-06-02 LAB
ANION GAP SERPL CALC-SCNC: 10 MMOL/L (ref 8–16)
BASOPHILS # BLD AUTO: 0.04 K/UL (ref 0–0.2)
BASOPHILS NFR BLD: 0.7 % (ref 0–1.9)
BUN SERPL-MCNC: 18 MG/DL (ref 6–20)
CALCIUM SERPL-MCNC: 9.5 MG/DL (ref 8.7–10.5)
CHLORIDE SERPL-SCNC: 110 MMOL/L (ref 95–110)
CO2 SERPL-SCNC: 19 MMOL/L (ref 23–29)
CREAT SERPL-MCNC: 1.3 MG/DL (ref 0.5–1.4)
DIFFERENTIAL METHOD: ABNORMAL
EOSINOPHIL # BLD AUTO: 0.2 K/UL (ref 0–0.5)
EOSINOPHIL NFR BLD: 2.8 % (ref 0–8)
ERYTHROCYTE [DISTWIDTH] IN BLOOD BY AUTOMATED COUNT: 15.4 % (ref 11.5–14.5)
EST. GFR  (NO RACE VARIABLE): 47.1 ML/MIN/1.73 M^2
GLUCOSE SERPL-MCNC: 85 MG/DL (ref 70–110)
HCT VFR BLD AUTO: 38.2 % (ref 37–48.5)
HGB BLD-MCNC: 11.5 G/DL (ref 12–16)
IMM GRANULOCYTES # BLD AUTO: 0.02 K/UL (ref 0–0.04)
IMM GRANULOCYTES NFR BLD AUTO: 0.3 % (ref 0–0.5)
LYMPHOCYTES # BLD AUTO: 1.9 K/UL (ref 1–4.8)
LYMPHOCYTES NFR BLD: 31.1 % (ref 18–48)
MCH RBC QN AUTO: 27.3 PG (ref 27–31)
MCHC RBC AUTO-ENTMCNC: 30.1 G/DL (ref 32–36)
MCV RBC AUTO: 91 FL (ref 82–98)
MONOCYTES # BLD AUTO: 1 K/UL (ref 0.3–1)
MONOCYTES NFR BLD: 17 % (ref 4–15)
NEUTROPHILS # BLD AUTO: 2.9 K/UL (ref 1.8–7.7)
NEUTROPHILS NFR BLD: 48.1 % (ref 38–73)
NRBC BLD-RTO: 0 /100 WBC
PLATELET # BLD AUTO: 293 K/UL (ref 150–450)
PMV BLD AUTO: 9.7 FL (ref 9.2–12.9)
POTASSIUM SERPL-SCNC: 3.9 MMOL/L (ref 3.5–5.1)
RBC # BLD AUTO: 4.21 M/UL (ref 4–5.4)
SODIUM SERPL-SCNC: 139 MMOL/L (ref 136–145)
WBC # BLD AUTO: 5.99 K/UL (ref 3.9–12.7)

## 2023-06-02 PROCEDURE — 25000003 PHARM REV CODE 250: Performed by: INTERNAL MEDICINE

## 2023-06-02 PROCEDURE — C1751 CATH, INF, PER/CENT/MIDLINE: HCPCS

## 2023-06-02 PROCEDURE — 25000003 PHARM REV CODE 250: Performed by: PHYSICIAN ASSISTANT

## 2023-06-02 PROCEDURE — 36410 VNPNXR 3YR/> PHY/QHP DX/THER: CPT

## 2023-06-02 PROCEDURE — 96372 THER/PROPH/DIAG INJ SC/IM: CPT | Performed by: STUDENT IN AN ORGANIZED HEALTH CARE EDUCATION/TRAINING PROGRAM

## 2023-06-02 PROCEDURE — G0378 HOSPITAL OBSERVATION PER HR: HCPCS

## 2023-06-02 PROCEDURE — 99233 PR SUBSEQUENT HOSPITAL CARE,LEVL III: ICD-10-PCS | Mod: ,,, | Performed by: INTERNAL MEDICINE

## 2023-06-02 PROCEDURE — 63600175 PHARM REV CODE 636 W HCPCS: Performed by: PHYSICIAN ASSISTANT

## 2023-06-02 PROCEDURE — 80048 BASIC METABOLIC PNL TOTAL CA: CPT | Performed by: STUDENT IN AN ORGANIZED HEALTH CARE EDUCATION/TRAINING PROGRAM

## 2023-06-02 PROCEDURE — 25000003 PHARM REV CODE 250: Performed by: STUDENT IN AN ORGANIZED HEALTH CARE EDUCATION/TRAINING PROGRAM

## 2023-06-02 PROCEDURE — 99232 SBSQ HOSP IP/OBS MODERATE 35: CPT | Mod: AF,HB,, | Performed by: PSYCHIATRY & NEUROLOGY

## 2023-06-02 PROCEDURE — 36415 COLL VENOUS BLD VENIPUNCTURE: CPT | Performed by: STUDENT IN AN ORGANIZED HEALTH CARE EDUCATION/TRAINING PROGRAM

## 2023-06-02 PROCEDURE — 99213 PR OFFICE/OUTPT VISIT, EST, LEVL III, 20-29 MIN: ICD-10-PCS | Mod: ,,, | Performed by: PHYSICIAN ASSISTANT

## 2023-06-02 PROCEDURE — 76937 US GUIDE VASCULAR ACCESS: CPT

## 2023-06-02 PROCEDURE — 99233 SBSQ HOSP IP/OBS HIGH 50: CPT | Mod: ,,, | Performed by: INTERNAL MEDICINE

## 2023-06-02 PROCEDURE — 85025 COMPLETE CBC W/AUTO DIFF WBC: CPT | Performed by: STUDENT IN AN ORGANIZED HEALTH CARE EDUCATION/TRAINING PROGRAM

## 2023-06-02 PROCEDURE — 11000001 HC ACUTE MED/SURG PRIVATE ROOM

## 2023-06-02 PROCEDURE — 99232 PR SUBSEQUENT HOSPITAL CARE,LEVL II: ICD-10-PCS | Mod: AF,HB,, | Performed by: PSYCHIATRY & NEUROLOGY

## 2023-06-02 PROCEDURE — 99213 OFFICE O/P EST LOW 20 MIN: CPT | Mod: ,,, | Performed by: PHYSICIAN ASSISTANT

## 2023-06-02 PROCEDURE — 63600175 PHARM REV CODE 636 W HCPCS: Performed by: STUDENT IN AN ORGANIZED HEALTH CARE EDUCATION/TRAINING PROGRAM

## 2023-06-02 RX ORDER — OXYCODONE HYDROCHLORIDE 5 MG/1
5 TABLET ORAL EVERY 4 HOURS PRN
Status: DISCONTINUED | OUTPATIENT
Start: 2023-06-02 | End: 2023-06-05

## 2023-06-02 RX ORDER — OXYCODONE HYDROCHLORIDE 10 MG/1
10 TABLET ORAL EVERY 4 HOURS PRN
Status: DISCONTINUED | OUTPATIENT
Start: 2023-06-02 | End: 2023-06-05

## 2023-06-02 RX ORDER — MIRTAZAPINE 30 MG/1
30 TABLET, FILM COATED ORAL NIGHTLY
Status: DISCONTINUED | OUTPATIENT
Start: 2023-06-02 | End: 2023-06-13 | Stop reason: HOSPADM

## 2023-06-02 RX ORDER — BUTALBITAL, ACETAMINOPHEN AND CAFFEINE 50; 325; 40 MG/1; MG/1; MG/1
1 TABLET ORAL EVERY 6 HOURS PRN
Status: DISCONTINUED | OUTPATIENT
Start: 2023-06-02 | End: 2023-06-13 | Stop reason: HOSPADM

## 2023-06-02 RX ADMIN — MIRTAZAPINE 30 MG: 30 TABLET, FILM COATED ORAL at 10:06

## 2023-06-02 RX ADMIN — GABAPENTIN 200 MG: 100 CAPSULE ORAL at 10:06

## 2023-06-02 RX ADMIN — TAMSULOSIN HYDROCHLORIDE 0.4 MG: 0.4 CAPSULE ORAL at 09:06

## 2023-06-02 RX ADMIN — ERTAPENEM 1 G: 1 INJECTION INTRAMUSCULAR; INTRAVENOUS at 10:06

## 2023-06-02 RX ADMIN — ENOXAPARIN SODIUM 40 MG: 40 INJECTION SUBCUTANEOUS at 04:06

## 2023-06-02 RX ADMIN — MEROPENEM 2 G: 1 INJECTION INTRAVENOUS at 09:06

## 2023-06-02 NOTE — SUBJECTIVE & OBJECTIVE
Interval History:   Urine cultures +Enterobacter  Discussed with urology, kidney function remains stable. Kaz will get nephrogram with urology as scheduled on 6/8  Flank pain improved     Review of Systems   Unable to perform ROS: Psychiatric disorder   Objective:     Vital Signs (Most Recent):  Temp: 97.5 °F (36.4 °C) (06/02/23 0740)  Pulse: 73 (06/02/23 0740)  Resp: 14 (06/02/23 0740)  BP: (!) 100/53 (06/02/23 0740)  SpO2: 98 % (06/02/23 0740) Vital Signs (24h Range):  Temp:  [96.6 °F (35.9 °C)-98.4 °F (36.9 °C)] 97.5 °F (36.4 °C)  Pulse:  [73-82] 73  Resp:  [14-19] 14  SpO2:  [95 %-98 %] 98 %  BP: (100-128)/(53-64) 100/53     Weight: 83 kg (183 lb)  Body mass index is 27.02 kg/m².    Estimated Creatinine Clearance: 53 mL/min (based on SCr of 1.3 mg/dL).     Physical Exam  Vitals and nursing note reviewed.   Constitutional:       General: She is not in acute distress.     Appearance: She is well-developed. She is not diaphoretic.   HENT:      Head: Normocephalic and atraumatic.   Eyes:      Pupils: Pupils are equal, round, and reactive to light.   Cardiovascular:      Rate and Rhythm: Normal rate and regular rhythm.      Heart sounds: Normal heart sounds. No murmur heard.    No friction rub. No gallop.   Pulmonary:      Effort: Pulmonary effort is normal. No respiratory distress.      Breath sounds: Normal breath sounds. No wheezing or rales.   Chest:      Chest wall: No tenderness.   Abdominal:      General: Bowel sounds are normal. There is no distension.      Palpations: Abdomen is soft. There is no mass.      Tenderness: There is no abdominal tenderness. There is no guarding or rebound.      Hernia: No hernia is present.      Comments: Ileostomy site and conduit site c/d/I   Left nephrostomy tube stable    Musculoskeletal:         General: No tenderness or deformity. Normal range of motion.      Cervical back: Normal range of motion and neck supple.   Skin:     General: Skin is warm and dry.       Coloration: Skin is not pale.      Findings: No erythema.   Neurological:      Mental Status: She is alert and oriented to person, place, and time.      Cranial Nerves: No cranial nerve deficit.      Coordination: Coordination normal.   Psychiatric:         Behavior: Behavior normal.         Thought Content: Thought content normal.        Significant Labs: All pertinent labs within the past 24 hours have been reviewed.    Significant Imaging: I have reviewed all pertinent imaging results/findings within the past 24 hours.

## 2023-06-02 NOTE — PLAN OF CARE
06/02/23 1241   Post-Acute Status   Post-Acute Authorization Placement   Post-Acute Placement Status Pending medical clearance/testing   Discharge Delays None known at this time   Discharge Plan   Discharge Plan A Psychiatric hospital   Discharge Plan B Home

## 2023-06-02 NOTE — PLAN OF CARE
[facility]  Discharge Reassessment    Primary Care Provider: Primary Doctor No    Expected Discharge Date: 6/3/2023    Reassessment (most recent)       Discharge Reassessment - 06/02/23 1242          Discharge Reassessment    Assessment Type Discharge Planning Reassessment     Did the patient's condition or plan change since previous assessment? No     Discharge Plan discussed with: Patient     Communicated OK with patient/caregiver Yes     Discharge Plan A Psychiatric hospital     Discharge Plan B Home     DME Needed Upon Discharge  none     Transition of Care Barriers Mental illness     Why the patient remains in the hospital Requires continued medical care        Post-Acute Status    Post-Acute Authorization Placement     Post-Acute Placement Status Pending medical clearance/testing     Discharge Delays None known at this time

## 2023-06-02 NOTE — CONSULTS
Single lumen 22G x 8cm midline placed cephalic vein. Max dwell date 01July2023, Lot# [id number].  Needle advanced into the vessel under real time ultrasound guidance.  Image recorded and saved.

## 2023-06-02 NOTE — SUBJECTIVE & OBJECTIVE
Interval History: Patient lying in bed, no acute distress. No acute events overnight. Patient reports continued fatigue and bilateral flank pain. Per urology, given good renal function, no indication for inpatient placement of right nephrostomy tube for the mild right hyrdrouteronephrosis. Repeat Renal US pending.     Review of Systems   Constitutional:  Positive for activity change and fatigue. Negative for chills and fever.   HENT:  Negative for congestion and trouble swallowing.    Eyes:  Negative for visual disturbance.   Respiratory:  Negative for cough and shortness of breath.    Cardiovascular:  Negative for chest pain and leg swelling.   Gastrointestinal:  Negative for abdominal distention, abdominal pain, nausea and vomiting.   Endocrine: Negative for cold intolerance, heat intolerance, polydipsia and polyuria.   Genitourinary:  Positive for flank pain. Negative for difficulty urinating and dysuria.   Musculoskeletal:  Negative for back pain and myalgias.   Skin:  Negative for rash and wound.   Neurological:  Positive for weakness. Negative for dizziness and light-headedness.   Hematological:  Negative for adenopathy. Does not bruise/bleed easily.   Psychiatric/Behavioral:  Negative for confusion and sleep disturbance.    Objective:     Vital Signs (Most Recent):  Temp: 98.4 °F (36.9 °C) (06/01/23 1523)  Pulse: 82 (06/01/23 1523)  Resp: 19 (06/01/23 1523)  BP: (!) 103/58 (06/01/23 1523)  SpO2: 95 % (06/01/23 1523) Vital Signs (24h Range):  Temp:  [97 °F (36.1 °C)-98.7 °F (37.1 °C)] 98.4 °F (36.9 °C)  Pulse:  [82-91] 82  Resp:  [17-19] 19  SpO2:  [95 %-97 %] 95 %  BP: ()/(51-67) 103/58     Weight: 83 kg (183 lb)  Body mass index is 27.02 kg/m².    Intake/Output Summary (Last 24 hours) at 6/1/2023 1914  Last data filed at 6/1/2023 0841  Gross per 24 hour   Intake --   Output 1150 ml   Net -1150 ml           Physical Exam  Constitutional:       Appearance: She is well-developed.   HENT:      Head:  Normocephalic and atraumatic.   Eyes:      General: No scleral icterus.     Pupils: Pupils are equal, round, and reactive to light.   Neck:      Vascular: No JVD.   Cardiovascular:      Rate and Rhythm: Normal rate and regular rhythm.      Heart sounds: No murmur heard.    No friction rub. No gallop.   Pulmonary:      Effort: Pulmonary effort is normal. No respiratory distress.      Breath sounds: Normal breath sounds. No wheezing or rales.   Abdominal:      General: Bowel sounds are normal. There is no distension.      Palpations: Abdomen is soft.      Tenderness: There is no abdominal tenderness. There is no guarding or rebound.      Comments: Ileostomy tube with normal output   Genitourinary:     Comments: Left nephrostomy tube with normal output   Musculoskeletal:         General: No deformity. Normal range of motion.      Cervical back: Neck supple.   Lymphadenopathy:      Cervical: No cervical adenopathy.   Skin:     General: Skin is warm and dry.      Capillary Refill: Capillary refill takes less than 2 seconds.      Findings: No erythema or rash.   Neurological:      Mental Status: She is alert and oriented to person, place, and time.      Cranial Nerves: No cranial nerve deficit.      Sensory: No sensory deficit.   Psychiatric:         Mood and Affect: Mood normal.           Significant Labs: A1C: No results for input(s): HGBA1C in the last 4320 hours.  Blood Culture:   No results for input(s): LABBLOO in the last 48 hours.    CBC:   Recent Labs   Lab 05/31/23  0510 06/01/23  0519   WBC 10.32 6.60   HGB 11.3* 10.8*   HCT 37.3 35.9*    252       CMP:   Recent Labs   Lab 05/31/23  0510 06/01/23  0519    138   K 3.8 4.0   * 112*   CO2 18* 20*   GLU 91 97   BUN 24* 21*   CREATININE 1.4 1.4   CALCIUM 9.4 9.3   ANIONGAP 10 6*       Urine Studies:   Recent Labs   Lab 05/30/23 2107   COLORU Yellow   APPEARANCEUA Hazy*   PHUR 6.0   SPECGRAV 1.015   PROTEINUA 1+*   GLUCUA Negative   KETONESU  Negative   BILIRUBINUA Negative   OCCULTUA Negative   NITRITE Positive*   LEUKOCYTESUR 3+*   RBCUA 7*   WBCUA >100*   BACTERIA Many*   HYALINECASTS 4*         Microbiology Results (last 7 days)       Procedure Component Value Units Date/Time    Urine culture [[id number]]  (Abnormal) Collected: 05/30/23 2107    Order Status: Completed Specimen: Urine Updated: 06/01/23 0017     Urine Culture, Routine GRAM NEGATIVE BALAJI  > 100,000 cfu/ml  Identification and susceptibility pending      Narrative:      Specimen Source->Urine    Blood culture x two cultures. Draw prior to antibiotics. [[id number]] Collected: 05/30/23 1845    Order Status: Completed Specimen: Blood from Peripheral, Wrist, Right Updated: 05/31/23 2022     Blood Culture, Routine No Growth to date      No Growth to date    Narrative:      Aerobic and anaerobic    Blood culture x two cultures. Draw prior to antibiotics. [[id number]] Collected: 05/30/23 1900    Order Status: Completed Specimen: Blood from Peripheral, Wrist, Left Updated: 05/31/23 2022     Blood Culture, Routine No Growth to date      No Growth to date    Narrative:      Aerobic and anaerobic              Significant Imaging: I have reviewed all pertinent imaging results/findings within the past 24 hours.

## 2023-06-02 NOTE — PROGRESS NOTES
[facility]  Infectious Disease  Progress Note    Patient Name: Edita Riley  MRN: [id number]  Admission Date: 5/30/2023  Length of Stay: 0 days  Attending Physician: Dioni Brush MD  Primary Care Provider: Primary Doctor No    Isolation Status: No active isolations  Assessment/Plan:      Renal/  * UTI (urinary tract infection)  59 y/o female hx of ureteral strictures due to XRT for cervical cancer s/p transverse colon conduit, bilateral nephrostomy tubes c/b recurrent pyelonephritis. She underwent right PCN drain exchange and stone extraction on 4/21/23, completed 10 day course of abx. She is admitted for recurrent UTIs, failed po bactrim. ID consulted for abx mgmt     Prior CT with mild right-sided hydroureteronephrosis with mild inflammatory changes about the right ureter, similar to prior.Left nephrostomy tube is in stable position.  No left-sided hydronephrosis.     Recommendations  1. D/c meropenem.start ertapenem as repeat urine cultures +Enterobacter. Will cover prior ESBL Klebsiella that grew  2. Anticipate 10 days of IV abx therapy . EOC 6/9/23   3. Discussed with urology, kidney function stable, can hold off on nephrostomy tube placement at this time. May undergo nephrostogram and a retrograde pyelogram on 6/6 with Dr. Balbuena   4. Blood cultures here NGTD.   5. Discussed with ID staff. Discussed with primary team and urology  6. Weekly cbc cmp while on ertapenem            Thank you for your consult. I will sign off. Please contact us if you have any additional questions.    Elizabeth Ron PA-C  Infectious Disease  [facility] - Med Surg    Subjective:     Principal Problem:UTI (urinary tract infection)    HPI: 59 y/o female with hx of cervical ca s/p XRT complicated by ureteral strictures and hydronephrosis requiring stent placements. Ultimately,  she failed the stents and a transverse colon conduit was performed.  This was complicated by colon perforation distal to the anastamosis.   She has been stable for a number of years but had to have bilateral nephrostomy tubes the right side for stones, the left due to concern for ureteroenteric anastamotic stricture.     She is followed by urology closely, she was last admitted 4/2023 for recurrent UTIs with ESBL Klebsiella and VRE. She underwent right PCN drain exchange and stone extraction on 4/21/23. She completed a 10 day course of abx for pyelonephritis.     She last saw urology on 5/10, planned to have a loopogram and antegrade nephrostogram to characterize the length of the stricture in planning for revision of the anastamosis.    Unfortunately she was seen in the ED following day for suicidal ideation after death of her daughter. She has been at [facility]. She had two ED visits for ostomy bag leakage requiring exchanges. She is now admitted for recurrent UTI with ESBL klebsiella without improvement with po bactrim. She is currently on meropenem. ID consulted for IV abx therapy.     Pt remained afebrile. Stable. No leukocytosis. ODESSA on admission. UA with many bacteruria. Blood cultures NGTD. Urine cultures pending. Prior CT studies Mild right-sided hydroureteronephrosis with mild inflammatory changes about the right ureter, similar to prior.Left nephrostomy tube is in stable position.  No left-sided hydronephrosis. U/s kidneys today.      Pt feels improved today. Has some bilateral flank pain. Denies having any fever chills or night sweats    Interval History:   Urine cultures +Enterobacter  Discussed with urology, kidney function remains stable. Kaz will get nephrogram with urology as scheduled on 6/8  Flank pain improved     Review of Systems   Unable to perform ROS: Psychiatric disorder   Objective:     Vital Signs (Most Recent):  Temp: 97.5 °F (36.4 °C) (06/02/23 0740)  Pulse: 73 (06/02/23 0740)  Resp: 14 (06/02/23 0740)  BP: (!) 100/53 (06/02/23 0740)  SpO2: 98 % (06/02/23 0740) Vital Signs (24h Range):  Temp:  [96.6 °F  (35.9 °C)-98.4 °F (36.9 °C)] 97.5 °F (36.4 °C)  Pulse:  [73-82] 73  Resp:  [14-19] 14  SpO2:  [95 %-98 %] 98 %  BP: (100-128)/(53-64) 100/53     Weight: 83 kg (183 lb)  Body mass index is 27.02 kg/m².    Estimated Creatinine Clearance: 53 mL/min (based on SCr of 1.3 mg/dL).     Physical Exam  Vitals and nursing note reviewed.   Constitutional:       General: She is not in acute distress.     Appearance: She is well-developed. She is not diaphoretic.   HENT:      Head: Normocephalic and atraumatic.   Eyes:      Pupils: Pupils are equal, round, and reactive to light.   Cardiovascular:      Rate and Rhythm: Normal rate and regular rhythm.      Heart sounds: Normal heart sounds. No murmur heard.    No friction rub. No gallop.   Pulmonary:      Effort: Pulmonary effort is normal. No respiratory distress.      Breath sounds: Normal breath sounds. No wheezing or rales.   Chest:      Chest wall: No tenderness.   Abdominal:      General: Bowel sounds are normal. There is no distension.      Palpations: Abdomen is soft. There is no mass.      Tenderness: There is no abdominal tenderness. There is no guarding or rebound.      Hernia: No hernia is present.      Comments: Ileostomy site and conduit site c/d/I   Left nephrostomy tube stable    Musculoskeletal:         General: No tenderness or deformity. Normal range of motion.      Cervical back: Normal range of motion and neck supple.   Skin:     General: Skin is warm and dry.      Coloration: Skin is not pale.      Findings: No erythema.   Neurological:      Mental Status: She is alert and oriented to person, place, and time.      Cranial Nerves: No cranial nerve deficit.      Coordination: Coordination normal.   Psychiatric:         Behavior: Behavior normal.         Thought Content: Thought content normal.        Significant Labs: All pertinent labs within the past 24 hours have been reviewed.    Significant Imaging: I have reviewed all pertinent imaging results/findings  within the past 24 hours.

## 2023-06-02 NOTE — PROGRESS NOTES
[facility] Medicine  Progress Note    Patient Name: Edita Riley  MRN: [id number]  Patient Class: OP- Observation   Admission Date: 5/30/2023  Length of Stay: 0 days  Attending Physician: Dioni Brush MD  Primary Care Provider: Primary Doctor No        Subjective:     Principal Problem:UTI (urinary tract infection)        HPI:  58 year old female with Hx of distal ureteral strictures due to XRT for cervical cancer s/p transverse colon conduit and B/L nephrostomy tubes complicated by MDR infections who presents to the ED for UTI. Patient was discharged last month after presenting with recurrent UTI (E. Faecium VRE). History limited form patient. She had undergone right PCN drain removal and stone extraction with drain replacement. Completed a course of Ertapenem 1 g IV daily for 10 days and Linezolid and was discharged on Bactrim after ID evaluation. Patient denies any fever, chills, nausea, vomiting, hematemesis, cough, chest pain, palpitations, shortness of breath, abdominal pain/distension, changes in bowel or urinary habits, no hematochezia or melena.      Overview/Hospital Course:  No notes on file    Interval History: Patient lying in bed, no acute distress. No acute events overnight. Patient reports continued fatigue and bilateral flank pain. Per urology, given good renal function, no indication for inpatient placement of right nephrostomy tube for the mild right hyrdrouteronephrosis. Repeat Renal US pending.     Review of Systems   Constitutional:  Positive for activity change and fatigue. Negative for chills and fever.   HENT:  Negative for congestion and trouble swallowing.    Eyes:  Negative for visual disturbance.   Respiratory:  Negative for cough and shortness of breath.    Cardiovascular:  Negative for chest pain and leg swelling.   Gastrointestinal:  Negative for abdominal distention, abdominal pain, nausea and vomiting.   Endocrine: Negative for cold intolerance,  heat intolerance, polydipsia and polyuria.   Genitourinary:  Positive for flank pain. Negative for difficulty urinating and dysuria.   Musculoskeletal:  Negative for back pain and myalgias.   Skin:  Negative for rash and wound.   Neurological:  Positive for weakness. Negative for dizziness and light-headedness.   Hematological:  Negative for adenopathy. Does not bruise/bleed easily.   Psychiatric/Behavioral:  Negative for confusion and sleep disturbance.    Objective:     Vital Signs (Most Recent):  Temp: 98.4 °F (36.9 °C) (06/01/23 1523)  Pulse: 82 (06/01/23 1523)  Resp: 19 (06/01/23 1523)  BP: (!) 103/58 (06/01/23 1523)  SpO2: 95 % (06/01/23 1523) Vital Signs (24h Range):  Temp:  [97 °F (36.1 °C)-98.7 °F (37.1 °C)] 98.4 °F (36.9 °C)  Pulse:  [82-91] 82  Resp:  [17-19] 19  SpO2:  [95 %-97 %] 95 %  BP: ()/(51-67) 103/58     Weight: 83 kg (183 lb)  Body mass index is 27.02 kg/m².    Intake/Output Summary (Last 24 hours) at 6/1/2023 1914  Last data filed at 6/1/2023 0841  Gross per 24 hour   Intake --   Output 1150 ml   Net -1150 ml           Physical Exam  Constitutional:       Appearance: She is well-developed.   HENT:      Head: Normocephalic and atraumatic.   Eyes:      General: No scleral icterus.     Pupils: Pupils are equal, round, and reactive to light.   Neck:      Vascular: No JVD.   Cardiovascular:      Rate and Rhythm: Normal rate and regular rhythm.      Heart sounds: No murmur heard.    No friction rub. No gallop.   Pulmonary:      Effort: Pulmonary effort is normal. No respiratory distress.      Breath sounds: Normal breath sounds. No wheezing or rales.   Abdominal:      General: Bowel sounds are normal. There is no distension.      Palpations: Abdomen is soft.      Tenderness: There is no abdominal tenderness. There is no guarding or rebound.      Comments: Ileostomy tube with normal output   Genitourinary:     Comments: Left nephrostomy tube with normal output   Musculoskeletal:         General:  No deformity. Normal range of motion.      Cervical back: Neck supple.   Lymphadenopathy:      Cervical: No cervical adenopathy.   Skin:     General: Skin is warm and dry.      Capillary Refill: Capillary refill takes less than 2 seconds.      Findings: No erythema or rash.   Neurological:      Mental Status: She is alert and oriented to person, place, and time.      Cranial Nerves: No cranial nerve deficit.      Sensory: No sensory deficit.   Psychiatric:         Mood and Affect: Mood normal.           Significant Labs: A1C: No results for input(s): HGBA1C in the last 4320 hours.  Blood Culture:   No results for input(s): LABBLOO in the last 48 hours.    CBC:   Recent Labs   Lab 05/31/23 0510 06/01/23 0519   WBC 10.32 6.60   HGB 11.3* 10.8*   HCT 37.3 35.9*    252       CMP:   Recent Labs   Lab 05/31/23 0510 06/01/23 0519    138   K 3.8 4.0   * 112*   CO2 18* 20*   GLU 91 97   BUN 24* 21*   CREATININE 1.4 1.4   CALCIUM 9.4 9.3   ANIONGAP 10 6*       Urine Studies:   Recent Labs   Lab 05/30/23 2107   COLORU Yellow   APPEARANCEUA Hazy*   PHUR 6.0   SPECGRAV 1.015   PROTEINUA 1+*   GLUCUA Negative   KETONESU Negative   BILIRUBINUA Negative   OCCULTUA Negative   NITRITE Positive*   LEUKOCYTESUR 3+*   RBCUA 7*   WBCUA >100*   BACTERIA Many*   HYALINECASTS 4*         Microbiology Results (last 7 days)       Procedure Component Value Units Date/Time    Urine culture [[id number]]  (Abnormal) Collected: 05/30/23 2107    Order Status: Completed Specimen: Urine Updated: 06/01/23 0017     Urine Culture, Routine GRAM NEGATIVE BALAJI  > 100,000 cfu/ml  Identification and susceptibility pending      Narrative:      Specimen Source->Urine    Blood culture x two cultures. Draw prior to antibiotics. [[id number]] Collected: 05/30/23 1845    Order Status: Completed Specimen: Blood from Peripheral, Wrist, Right Updated: 05/31/23 2022     Blood Culture, Routine No Growth to date      No Growth to date    Narrative:       Aerobic and anaerobic    Blood culture x two cultures. Draw prior to antibiotics. [[id number]] Collected: 05/30/23 1900    Order Status: Completed Specimen: Blood from Peripheral, Wrist, Left Updated: 05/31/23 2022     Blood Culture, Routine No Growth to date      No Growth to date    Narrative:      Aerobic and anaerobic              Significant Imaging: I have reviewed all pertinent imaging results/findings within the past 24 hours.      Assessment/Plan:      * UTI (urinary tract infection)  Bilateral flank pain  - Recurrent Urinary tact infection related to nephrostomy tube and chronic uretral strictures  - Follow up urine cultures and blood cultures  - Urine culture 4/10 growing klebsiella and enterococcus.  - followup urine cultures   - prn percocet and prn IV morphine   - ID consulted. apprec recs  -- continue meropenem     Nephrostomy status  - S/p Bilateral Nephrostomy tubes; R. Removed after recent complication and remains with L.Nephrostomy  - Continue with Flomax 0.4 mg, daily  - Follow up renal ultrasound  - per urology, no need for right nephrostomy tube placement given normal renal function. Outpatient urology followup    Ileostomy in place  - S/p Transverse colon conduit 2020 complicate by bowel perforation requiring hemicolectomy & ileostomy  - Follow up outpatient CRS    ODESSA (acute kidney injury)  ODESSA on CKD  - Scr on admit, 1.7 --> 1.4  - Monitor renal function  - Avoid Nephrotoxic agents  - Monitor urine output   - Follow up Renal U/S  - IMPROVING       VTE Risk Mitigation (From admission, onward)         Ordered     enoxaparin injection 40 mg  Daily         05/30/23 2228     IP VTE HIGH RISK PATIENT  Once         05/30/23 2228     Place sequential compression device  Until discontinued         05/30/23 2228                Discharge Planning   OK: 6/2/2023     Code Status: Full Code   Is the patient medically ready for discharge?: No    Reason for patient still in hospital (select all that  apply): Patient trending condition, Laboratory test, Treatment and Consult recommendations  Discharge Plan A: Psychiatric hospital          Time spent in care of patient: > 35 minutes           Dioni Brush MD  Department of Hospital Medicine   [facility]

## 2023-06-02 NOTE — PROGRESS NOTES
"CONSULTATION LIAISON PSYCHIATRY PROGRESS NOTE    Patient Name: Edita Riley  MRN: [id number]  Patient Class: OP- Observation  Admission Date: 5/30/2023  Attending Physician: Dioni Brush MD      SUBJECTIVE:   Edita Riley is a 58 y.o. female with past psychiatric history of depression & past pertinent medical history of cervical cancer s/p transverse colon conduit, bilateral nephrostomy tubes c/b recurrent pyelonephritis who presents to the ED/admitted to the hospital for UTI (urinary tract infection). She was transferred from [facility] where she is admitted under FVA for depression and suicidal ideation.      Psychiatry consulted for "h/o depression admitted for recurrent UTI. Recent voluntary admission to [facility] for SI after her daughter committed suicide on 5/11/2023. PEC orderd. Consult for management of depression and SI."    Chart reviewed, patient seen. Today, reports things are not going well. Having pack and abdominal pain. States her ostomy is leaking due to breakdown of skin. Continues to report ongoing depression, fatigue, and tearfulness. Continues to report suicidal ideation. States "I would take an gun and just shoot myself." Endorses hopelessness. Did not sleep well overnight, maybe 4 hours. No problems with appetite. Denies HI. Hearing her daughters voice. Denies VH.        OBJECTIVE:    Mental Status Exam:  General Appearance:  appears stated age, fair grooming, casually dressed in hospital gown  Behavior: cooperative, friendly, appropriate eye-contact  Involuntary Movements and Motor Activity: no abnormal involuntary movements noted; no tics, no tremors, no akathisia, no dystonia, no evidence of tardive dyskinesia; no psychomotor agitation or retardation  Gait and Station: unable to assess - patient lying down or seated  Speech and Language: intact; normal rate, rhythm, volume and dysphoric tone; conversational, spontaneous, and coherent; speaks and " "understands English proficiently and fluently; repeats words and phrases, no word finding difficulties are noted  Mood: "depressed"   Affect:  mood-congruent, dysphoric, tearful  Thought Process and Associations: linear, goal-directed, organized  Thought Content and Perceptions::  +SI with plan; denies HI; +AH of  daughter; denies VH  Sensorium and Orientation: intact; alert with clear sensorium; oriented fully to person, place, time and situation  Recent and Remote Memory: grossly intact, able to recall relevant and salient information from the recent and remote past  Attention and Concentration: intact, attentive to conversation, able to spell WORLD forwards and backwards  Fund of Knowledge: grossly intact, used appropriate vocabulary and demonstrated an awareness of current events, consistent with educational level achieved  Insight: demonstrates awareness of illness + situation  Judgment: compliant with health provider's recommendations and instructions     CAM ICU positive? no      ASSESSMENT & RECOMMENDATIONS   MDD, recurrent, severe  Complicated bereavement    PSYCH MEDICATIONS  Scheduled  - Continue Remeron 15mg qHS      RISK ASSESSMENT  NEEDS PEC for SI & NEEDS 1:1 sitter  PEC/CEC  2023 @ 6:20pm    FOLLOW UP  Will follow up while in house    DISPOSITION - once medically cleared:  Seek involuntary inpatient psychiatric admission for stabilization of acute psychiatric symptoms and a safe disposition plan is enacted. The patient &/or their family was informed that the patient will be transferred to an inpatient unit per ED placement team.     Please contact ON CALL psychiatry service () for any acute issues that may arise.    Dr. Jessy Becker  CL Psychiatry  [facility]  2023 11:41 " AM        --------------------------------------------------------------------------------------------------------------------------------------------------------------------------------------------------------------------------------------    CONTINUED OBJECTIVE clinical data & findings reviewed and noted for above decision making    Current Medications:   Scheduled Meds:    enoxparin  40 mg Subcutaneous Daily    ertapenem (INVANZ) IVPB  1 g Intravenous Q24H    gabapentin  200 mg Oral QHS    mirtazapine  30 mg Oral Nightly    tamsulosin  0.4 mg Oral Daily     PRN Meds: acetaminophen, morphine, oxyCODONE-acetaminophen, oxyCODONE-acetaminophen, sodium chloride 0.9%    Allergies:   Review of patient's allergies indicates:   Allergen Reactions    Bee sting [allergen ext-venom-honey bee]      Rash      Grass pollen-bermuda, standard      rash       Vitals  Vitals:    06/02/23 0740   BP: (!) 100/53   Pulse: 73   Resp: 14   Temp: 97.5 °F (36.4 °C)       Labs/Imaging/Studies:  Recent Results (from the past 24 hour(s))   Basic Metabolic Panel    Collection Time: 06/02/23  5:49 AM   Result Value Ref Range    Sodium 139 136 - 145 mmol/L    Potassium 3.9 3.5 - 5.1 mmol/L    Chloride 110 95 - 110 mmol/L    CO2 19 (L) 23 - 29 mmol/L    Glucose 85 70 - 110 mg/dL    BUN 18 6 - 20 mg/dL    Creatinine 1.3 0.5 - 1.4 mg/dL    Calcium 9.5 8.7 - 10.5 mg/dL    Anion Gap 10 8 - 16 mmol/L    eGFR 47.1 (A) >60 mL/min/1.73 m^2   CBC auto differential    Collection Time: 06/02/23  5:49 AM   Result Value Ref Range    WBC 5.99 3.90 - 12.70 K/uL    RBC 4.21 4.00 - 5.40 M/uL    Hemoglobin 11.5 (L) 12.0 - 16.0 g/dL    Hematocrit 38.2 37.0 - 48.5 %    MCV 91 82 - 98 fL    MCH 27.3 27.0 - 31.0 pg    MCHC 30.1 (L) 32.0 - 36.0 g/dL    RDW 15.4 (H) 11.5 - 14.5 %    Platelets 293 150 - 450 K/uL    MPV 9.7 9.2 - 12.9 fL    Immature Granulocytes 0.3 0.0 - 0.5 %    Gran # (ANC) 2.9 1.8 - 7.7 K/uL    Immature Grans (Abs) 0.02 0.00 - 0.04 K/uL    Lymph # 1.9  1.0 - 4.8 K/uL    Mono # 1.0 0.3 - 1.0 K/uL    Eos # 0.2 0.0 - 0.5 K/uL    Baso # 0.04 0.00 - 0.20 K/uL    nRBC 0 0 /100 WBC    Gran % 48.1 38.0 - 73.0 %    Lymph % 31.1 18.0 - 48.0 %    Mono % 17.0 (H) 4.0 - 15.0 %    Eosinophil % 2.8 0.0 - 8.0 %    Basophil % 0.7 0.0 - 1.9 %    Differential Method Automated      Imaging Results    None

## 2023-06-02 NOTE — PLAN OF CARE
[facility] faxed release of records to [facility] in [place] to , will follow with records and update staff and psych teams.

## 2023-06-02 NOTE — CONSULTS
"[facility]  Psychology  Consult Note    PROGRESS NOTE - INTERVENTION  Patient Name: Edita Riley  MRN: [id number]  Date: 2023  Admission Date: 2023   Length of Stay: Hospital Day: 4   Attending Physician: Dioni Brush MD    Inpatient consult to Psychology  Consult performed by: Cipriano Holland, PhD  Consult ordered by: Dioni Brush MD    HISTORY OF PRESENTING ILLNESS: Edita Riley is a 58 y.o. female with past psychiatric history of depression & past pertinent medical history of cervical cancer s/p transverse colon conduit, bilateral nephrostomy tubes c/b recurrent pyelonephritis who presents to the ED/admitted to the hospital for UTI (urinary tract infection). She was transferred from [facility] where she is admitted under FVA for depression and suicidal ideation.     BRIEF ASSESSMENT  Mood: Depressed mood, anhedonia, suicidal ideation. Patient endorsed suicidal plans and intent. She stated that if possible, she would shoot herself. Patient added that she has access to guns.  Anxiety: Denied anxiety symptoms.  Pain: Rated current pain as 9/10. Denied concerns regarding pain management.  Sleep: Mild impairment involving delayed onset.  Appetite: Slightly decreased.    INTERVENTION  Intervention Type: Cognitive Behavioral Therapy  Session Content: Provided education regarding grief and coping with loss. Patient explained that her daughter recently  by suicide. Patient's other daughter  by suicide in . Her daughters were identical twins. Engaged patient in cognitive restructuring intervention. Guided patient in process of identifying, challenging, and adjusting dysfunctional beliefs (e.g., "I failed her as a mother.")    MENTAL STATUS EXAM & OBSERVATIONS  Appearance:  Good grooming and hygiene. Dressed in hospital gown. Appeared stated age.      Orientation: Oriented x 4.   Speech: Normal rate, volume, and prosody.    Thought Processes: " Logical and goal-oriented.      Thought Content: Normal. No suicidal or homicidal ideation. No indications of obsessions or delusions.   Mood: Dysphoric.   Affect: Full range, congruent with mood and session content.   Attention & Concentration: Intact. No deficits noted.   Insight: Good   Judgment: Good.   Behavioral Observations: Cooperative and engaged. Laying with head of bed elevated. Good eye contact. No signs of psychomotor agitation or retardation.     DIAGNOSTIC IMPRESSION & PLAN  Patient Active Problem List   Diagnosis    Cervical cancer    Osteoarthritis of back    History of essential hypertension    Lower extremity pain, diffuse    Weakness of both lower extremities    Impaired functional mobility, balance, gait, and endurance    Colon polyp    Internal hemorrhoids    Severe episode of recurrent major depressive disorder, with psychotic features    Fibromyalgia    Carpal tunnel syndrome on right    History of cervical cancer    Metastatic squamous cell carcinoma to lymph node    Generalized anxiety disorder    PTSD (post-traumatic stress disorder)    Neuropathy due to chemotherapeutic drug    Seizure-like activity    ODESSA (acute kidney injury)    Anemia due to chronic kidney disease    Hydronephrosis    Family history of colon cancer    Abnormal mammogram    Radiation cystitis    Moderate malnutrition    Vitamin D deficiency    Ileostomy in place    History of DVT (deep vein thrombosis)    Presence of urostomy    QT prolongation    Metabolic acidosis    Hard to intubate    Moderate episode of recurrent major depressive disorder    Nephrostomy status    Depression    Physical deconditioning    Preprocedural cardiovascular examination    CKD (chronic kidney disease), stage III    UTI (urinary tract infection)    Migraine without aura and without status migrainosus, not intractable    Arthritis    Emphysema of lung    Edema    Refusal of blood transfusions as patient is Yarsani       Impression:  [facility] 58 [facility] with past psychiatric history of depression and complex PMHX admitted for UTI. Patient transferred from [facility] where she was admitted for depression and S/I following the recent death of her daughter. Daughter  by suicide. Psychology consulted to treat depression. Psychiatry also following. Patient participated well in a brief CBT intervention.     Plan: Psychology will continue to follow.    Recommendations: Outpatient follow up with Psychiatry and Psychology      Thank you for the opportunity to participate in this patient's care.      Length of Service: 29 minutes    Cipriano Holland, Ph.D.  Dept. of Psychiatry  [facility]

## 2023-06-03 LAB
ANION GAP SERPL CALC-SCNC: 8 MMOL/L (ref 8–16)
BASOPHILS # BLD AUTO: 0.04 K/UL (ref 0–0.2)
BASOPHILS NFR BLD: 0.6 % (ref 0–1.9)
BUN SERPL-MCNC: 16 MG/DL (ref 6–20)
CALCIUM SERPL-MCNC: 9.6 MG/DL (ref 8.7–10.5)
CHLORIDE SERPL-SCNC: 113 MMOL/L (ref 95–110)
CO2 SERPL-SCNC: 19 MMOL/L (ref 23–29)
CREAT SERPL-MCNC: 1.3 MG/DL (ref 0.5–1.4)
DIFFERENTIAL METHOD: ABNORMAL
EOSINOPHIL # BLD AUTO: 0.2 K/UL (ref 0–0.5)
EOSINOPHIL NFR BLD: 2.8 % (ref 0–8)
ERYTHROCYTE [DISTWIDTH] IN BLOOD BY AUTOMATED COUNT: 15.4 % (ref 11.5–14.5)
EST. GFR  (NO RACE VARIABLE): 47.1 ML/MIN/1.73 M^2
GLUCOSE SERPL-MCNC: 85 MG/DL (ref 70–110)
HCT VFR BLD AUTO: 39.9 % (ref 37–48.5)
HGB BLD-MCNC: 11.7 G/DL (ref 12–16)
IMM GRANULOCYTES # BLD AUTO: 0.02 K/UL (ref 0–0.04)
IMM GRANULOCYTES NFR BLD AUTO: 0.3 % (ref 0–0.5)
LYMPHOCYTES # BLD AUTO: 1.8 K/UL (ref 1–4.8)
LYMPHOCYTES NFR BLD: 26 % (ref 18–48)
MCH RBC QN AUTO: 27 PG (ref 27–31)
MCHC RBC AUTO-ENTMCNC: 29.3 G/DL (ref 32–36)
MCV RBC AUTO: 92 FL (ref 82–98)
MONOCYTES # BLD AUTO: 0.9 K/UL (ref 0.3–1)
MONOCYTES NFR BLD: 13.2 % (ref 4–15)
NEUTROPHILS # BLD AUTO: 4 K/UL (ref 1.8–7.7)
NEUTROPHILS NFR BLD: 57.1 % (ref 38–73)
NRBC BLD-RTO: 0 /100 WBC
PLATELET # BLD AUTO: 325 K/UL (ref 150–450)
PMV BLD AUTO: 9.7 FL (ref 9.2–12.9)
POTASSIUM SERPL-SCNC: 3.9 MMOL/L (ref 3.5–5.1)
RBC # BLD AUTO: 4.34 M/UL (ref 4–5.4)
SODIUM SERPL-SCNC: 140 MMOL/L (ref 136–145)
WBC # BLD AUTO: 7.03 K/UL (ref 3.9–12.7)

## 2023-06-03 PROCEDURE — 99232 PR SUBSEQUENT HOSPITAL CARE,LEVL II: ICD-10-PCS | Mod: AF,HB,, | Performed by: STUDENT IN AN ORGANIZED HEALTH CARE EDUCATION/TRAINING PROGRAM

## 2023-06-03 PROCEDURE — 96372 THER/PROPH/DIAG INJ SC/IM: CPT | Performed by: STUDENT IN AN ORGANIZED HEALTH CARE EDUCATION/TRAINING PROGRAM

## 2023-06-03 PROCEDURE — 80048 BASIC METABOLIC PNL TOTAL CA: CPT | Performed by: STUDENT IN AN ORGANIZED HEALTH CARE EDUCATION/TRAINING PROGRAM

## 2023-06-03 PROCEDURE — 25000003 PHARM REV CODE 250: Performed by: PHYSICIAN ASSISTANT

## 2023-06-03 PROCEDURE — 25000003 PHARM REV CODE 250: Performed by: INTERNAL MEDICINE

## 2023-06-03 PROCEDURE — 63600175 PHARM REV CODE 636 W HCPCS: Performed by: PHYSICIAN ASSISTANT

## 2023-06-03 PROCEDURE — 85025 COMPLETE CBC W/AUTO DIFF WBC: CPT | Performed by: STUDENT IN AN ORGANIZED HEALTH CARE EDUCATION/TRAINING PROGRAM

## 2023-06-03 PROCEDURE — 25000003 PHARM REV CODE 250: Performed by: STUDENT IN AN ORGANIZED HEALTH CARE EDUCATION/TRAINING PROGRAM

## 2023-06-03 PROCEDURE — 99232 SBSQ HOSP IP/OBS MODERATE 35: CPT | Mod: AF,HB,, | Performed by: STUDENT IN AN ORGANIZED HEALTH CARE EDUCATION/TRAINING PROGRAM

## 2023-06-03 PROCEDURE — 99232 SBSQ HOSP IP/OBS MODERATE 35: CPT | Mod: ,,, | Performed by: HOSPITALIST

## 2023-06-03 PROCEDURE — 11000001 HC ACUTE MED/SURG PRIVATE ROOM

## 2023-06-03 PROCEDURE — 63600175 PHARM REV CODE 636 W HCPCS: Performed by: STUDENT IN AN ORGANIZED HEALTH CARE EDUCATION/TRAINING PROGRAM

## 2023-06-03 PROCEDURE — G0378 HOSPITAL OBSERVATION PER HR: HCPCS

## 2023-06-03 PROCEDURE — 99232 PR SUBSEQUENT HOSPITAL CARE,LEVL II: ICD-10-PCS | Mod: ,,, | Performed by: HOSPITALIST

## 2023-06-03 PROCEDURE — 36415 COLL VENOUS BLD VENIPUNCTURE: CPT | Performed by: STUDENT IN AN ORGANIZED HEALTH CARE EDUCATION/TRAINING PROGRAM

## 2023-06-03 RX ORDER — MIRTAZAPINE 30 MG/1
30 TABLET, FILM COATED ORAL NIGHTLY
Qty: 30 TABLET | Refills: 11 | Status: ON HOLD | OUTPATIENT
Start: 2023-06-03 | End: 2023-11-05

## 2023-06-03 RX ORDER — OXYCODONE HYDROCHLORIDE 5 MG/1
5 TABLET ORAL EVERY 12 HOURS PRN
Qty: 20 TABLET | Refills: 0 | Status: ON HOLD | OUTPATIENT
Start: 2023-06-03 | End: 2023-11-05

## 2023-06-03 RX ADMIN — ERTAPENEM 1 G: 1 INJECTION INTRAMUSCULAR; INTRAVENOUS at 09:06

## 2023-06-03 RX ADMIN — TAMSULOSIN HYDROCHLORIDE 0.4 MG: 0.4 CAPSULE ORAL at 09:06

## 2023-06-03 RX ADMIN — MIRTAZAPINE 30 MG: 30 TABLET, FILM COATED ORAL at 09:06

## 2023-06-03 RX ADMIN — GABAPENTIN 200 MG: 100 CAPSULE ORAL at 09:06

## 2023-06-03 RX ADMIN — ENOXAPARIN SODIUM 40 MG: 40 INJECTION SUBCUTANEOUS at 05:06

## 2023-06-03 NOTE — ASSESSMENT & PLAN NOTE
Patient has persistent depression which is severe and is currently uncontrolled. Will Increase anti-depressant medications. We will consult psychiatry at this time. Patient does not display psychosis at this time. Continue to monitor closely and adjust plan of care as needed.  - patient's daughter committed suicide recently   - psychiatry and psychology following, apprec recs  - remeron 30 mg nightly   - SW obtaining medical records from [facility] in [place]

## 2023-06-03 NOTE — PLAN OF CARE
Sw informed Pt is medically stable to dc, [facility] in [place] is the preferred location, PFC to place Pt where accepted.

## 2023-06-03 NOTE — PROGRESS NOTES
[facility] Medicine  Progress Note    Patient Name: Edita Riley  MRN: [id number]  Patient Class: OP- Observation   Admission Date: 5/30/2023  Length of Stay: 0 days  Attending Physician: Mell Adhikari MD  Primary Care Provider: Primary Doctor No        Subjective:     Principal Problem:UTI (urinary tract infection)        HPI:  58 year old female with Hx of distal ureteral strictures due to XRT for cervical cancer s/p transverse colon conduit and B/L nephrostomy tubes complicated by MDR infections who presents to the ED for UTI. Patient was discharged last month after presenting with recurrent UTI (E. Faecium VRE). History limited form patient. She had undergone right PCN drain removal and stone extraction with drain replacement. Completed a course of Ertapenem 1 g IV daily for 10 days and Linezolid and was discharged on Bactrim after ID evaluation. Patient denies any fever, chills, nausea, vomiting, hematemesis, cough, chest pain, palpitations, shortness of breath, abdominal pain/distension, changes in bowel or urinary habits, no hematochezia or melena.      Overview/Hospital Course:  Per urology, given good renal function, no indication for inpatient placement of right nephrostomy tube for the moderate right hyrdrouteronephrosis. Per ID, switched to ertapenem based on urine culture which grew Enterobacter and will complete 10 day total course. Midline consult placed. Psychiatry following and remeron increased to 30 mg nightly. Psychology following and SW obtaining records from [facility] in [place] for med list.    6/3-  ertapenem, EOC 6/9/23. /60 VSS.  Midline placed. She is Med Ready for discharge. PEC and CEC in place. CM working with [facility].      Interval History: see above    Review of Systems   Constitutional:  Positive for activity change and fatigue. Negative for chills and fever.   HENT:  Negative for congestion and trouble swallowing.     Eyes:  Negative for visual disturbance.   Respiratory:  Negative for cough and shortness of breath.    Cardiovascular:  Negative for chest pain and leg swelling.   Gastrointestinal:  Negative for abdominal distention, abdominal pain, nausea and vomiting.   Endocrine: Negative for cold intolerance, heat intolerance, polydipsia and polyuria.   Genitourinary:  Positive for flank pain. Negative for difficulty urinating and dysuria.   Musculoskeletal:  Negative for back pain and myalgias.   Skin:  Negative for rash and wound.   Neurological:  Positive for weakness. Negative for dizziness and light-headedness.   Hematological:  Negative for adenopathy. Does not bruise/bleed easily.   Psychiatric/Behavioral:  Negative for confusion and sleep disturbance.    Objective:     Vital Signs (Most Recent):  Temp: 98.2 °F (36.8 °C) (06/03/23 0736)  Pulse: 81 (06/03/23 0736)  Resp: 14 (06/03/23 0736)  BP: 116/60 (06/03/23 0736)  SpO2: 97 % (06/03/23 0736) Vital Signs (24h Range):  Temp:  [97.8 °F (36.6 °C)-98.2 °F (36.8 °C)] 98.2 °F (36.8 °C)  Pulse:  [78-81] 81  Resp:  [14-20] 14  SpO2:  [96 %-98 %] 97 %  BP: (114-125)/(60-72) 116/60     Weight: 83 kg (183 lb)  Body mass index is 27.02 kg/m².    Intake/Output Summary (Last 24 hours) at 6/3/2023 0905  Last data filed at 6/3/2023 0545  Gross per 24 hour   Intake --   Output 675 ml   Net -675 ml           Physical Exam  Constitutional:       Appearance: She is well-developed.   HENT:      Head: Normocephalic and atraumatic.   Eyes:      General: No scleral icterus.     Pupils: Pupils are equal, round, and reactive to light.   Neck:      Vascular: No JVD.   Cardiovascular:      Rate and Rhythm: Normal rate and regular rhythm.      Heart sounds: No murmur heard.    No friction rub. No gallop.   Pulmonary:      Effort: Pulmonary effort is normal. No respiratory distress.      Breath sounds: Normal breath sounds. No wheezing or rales.   Abdominal:      General: Bowel sounds are normal.  There is no distension.      Palpations: Abdomen is soft.      Tenderness: There is no abdominal tenderness. There is no guarding or rebound.      Comments: Ileostomy tube with normal output   Genitourinary:     Comments: Left nephrostomy tube with normal output   Musculoskeletal:         General: No deformity. Normal range of motion.      Cervical back: Neck supple.   Lymphadenopathy:      Cervical: No cervical adenopathy.   Skin:     General: Skin is warm and dry.      Capillary Refill: Capillary refill takes less than 2 seconds.      Findings: No erythema or rash.   Neurological:      Mental Status: She is alert and oriented to person, place, and time.      Cranial Nerves: No cranial nerve deficit.      Sensory: No sensory deficit.   Psychiatric:         Mood and Affect: Mood normal.           Significant Labs: A1C: No results for input(s): HGBA1C in the last 4320 hours.  Blood Culture:   No results for input(s): LABBLOO in the last 48 hours.    CBC:   Recent Labs   Lab 06/02/23  0549 06/03/23  0339   WBC 5.99 7.03   HGB 11.5* 11.7*   HCT 38.2 39.9    325       CMP:   Recent Labs   Lab 06/02/23  0549 06/03/23  0339    140   K 3.9 3.9    113*   CO2 19* 19*   GLU 85 85   BUN 18 16   CREATININE 1.3 1.3   CALCIUM 9.5 9.6   ANIONGAP 10 8       Urine Studies:   No results for input(s): COLORU, APPEARANCEUA, PHUR, SPECGRAV, PROTEINUA, GLUCUA, KETONESU, BILIRUBINUA, OCCULTUA, NITRITE, UROBILINOGEN, LEUKOCYTESUR, RBCUA, WBCUA, BACTERIA, SQUAMEPITHEL, HYALINECASTS in the last 48 hours.    Invalid input(s): [facility]      Microbiology Results (last 7 days)       Procedure Component Value Units Date/Time    Blood culture x two cultures. Draw prior to antibiotics. [[id number]] Collected: 05/30/23 1845    Order Status: Completed Specimen: Blood from Peripheral, Wrist, Right Updated: 06/02/23 2022     Blood Culture, Routine No Growth to date      No Growth to date      No Growth to date      No Growth to date     Narrative:      Aerobic and anaerobic    Blood culture x two cultures. Draw prior to antibiotics. [[id number]] Collected: 05/30/23 1900    Order Status: Completed Specimen: Blood from Peripheral, Wrist, Left Updated: 06/02/23 2022     Blood Culture, Routine No Growth to date      No Growth to date      No Growth to date      No Growth to date    Narrative:      Aerobic and anaerobic    Urine culture [[id number]]  (Abnormal)  (Susceptibility) Collected: 05/30/23 2107    Order Status: Completed Specimen: Urine Updated: 06/01/23 2251     Urine Culture, Routine ENTEROBACTER CLOACAE  > 100,000 cfu/ml      Narrative:      Specimen Source->Urine              Significant Imaging: I have reviewed all pertinent imaging results/findings within the past 24 hours.      Assessment/Plan:      * UTI (urinary tract infection)  Bilateral flank pain  - Recurrent Urinary tact infection related to nephrostomy tube and chronic uretral strictures  - Follow up urine cultures and blood cultures  - Urine culture 4/10 growing klebsiella and enterococcus.  - followup urine cultures   - prn percocet and prn IV morphine   - ID consulted. apprec recs  -- urine cx grew Enterobacter   -- changed meropenem to ertapenem to complete 10 day course    6/3- stable. Stop day for ertepenum is 6/9. May be discharged back to [facility]. Has midline.     ODESSA (acute kidney injury)  ODESSA on CKD  - Scr on admit, 1.7 --> 1.4  - Monitor renal function  - Avoid Nephrotoxic agents  - Monitor urine output   - Follow up Renal U/S  - IMPROVING     6/3- cr 1.3. pt eating and drinking    Ileostomy in place  - S/p Transverse colon conduit 2020 complicate by bowel perforation requiring hemicolectomy & ileostomy  - Follow up outpatient CRS    Nephrostomy status  - S/p Bilateral Nephrostomy tubes; R. Removed after recent complication and remains with L.Nephrostomy  - Continue with Flomax 0.4 mg, daily  - Follow up renal ultrasound  - per urology, no need for right  nephrostomy tube placement given normal renal function. Outpatient urology followup    Major depressive disorder, recurrent episode, moderate  Patient has persistent depression which is severe and is currently uncontrolled. Will Increase anti-depressant medications. We will consult psychiatry at this time. Patient does not display psychosis at this time. Continue to monitor closely and adjust plan of care as needed.  - patient's daughter committed suicide recently   - psychiatry and psychology following, apprec recs  - remeron 30 mg nightly   - SW obtaining medical records from [facility] in [place]    6/3 - per CM, the plan is to return to [facility]      VTE Risk Mitigation (From admission, onward)         Ordered     enoxaparin injection 40 mg  Daily         05/30/23 2228     IP VTE HIGH RISK PATIENT  Once         05/30/23 2228     Place sequential compression device  Until discontinued         05/30/23 2228                Discharge Planning   OK: 6/3/2023     Code Status: Full Code   Is the patient medically ready for discharge?: Yes    Reason for patient still in hospital (select all that apply): Patient trending condition  Discharge Plan A: Psychiatric hospital   Discharge Delays: None known at this time      Mell Adhikari MD  Department of Hospital Medicine   [facility]

## 2023-06-03 NOTE — ASSESSMENT & PLAN NOTE
ODESSA on CKD  - Scr on admit, 1.7 --> 1.4  - Monitor renal function  - Avoid Nephrotoxic agents  - Monitor urine output   - Follow up Renal U/S  - IMPROVING     6/3- cr 1.3. pt eating and drinking

## 2023-06-03 NOTE — ASSESSMENT & PLAN NOTE
Bilateral flank pain  - Recurrent Urinary tact infection related to nephrostomy tube and chronic uretral strictures  - Follow up urine cultures and blood cultures  - Urine culture 4/10 growing klebsiella and enterococcus.  - followup urine cultures   - prn percocet and prn IV morphine   - ID consulted. apprec recs  -- urine cx grew Enterobacter   -- changed meropenem to ertapenem to complete 10 day course    6/3- stable. Stop day for ertepenum is 6/9. May be discharged back to [facility]. Has midline.

## 2023-06-03 NOTE — ASSESSMENT & PLAN NOTE
Patient has persistent depression which is severe and is currently uncontrolled. Will Increase anti-depressant medications. We will consult psychiatry at this time. Patient does not display psychosis at this time. Continue to monitor closely and adjust plan of care as needed.  - patient's daughter committed suicide recently   - psychiatry and psychology following, apprec recs  - remeron 30 mg nightly   - SW obtaining medical records from [facility] in [place]    6/3 - per CM, the plan is to return to [facility]

## 2023-06-03 NOTE — ASSESSMENT & PLAN NOTE
Bilateral flank pain  - Recurrent Urinary tact infection related to nephrostomy tube and chronic uretral strictures  - Follow up urine cultures and blood cultures  - Urine culture 4/10 growing klebsiella and enterococcus.  - followup urine cultures   - prn percocet and prn IV morphine   - ID consulted. apprec recs  -- urine cx grew Enterobacter   -- changed meropenem to ertapenem to complete 10 day course

## 2023-06-03 NOTE — SUBJECTIVE & OBJECTIVE
Interval History: see above    Review of Systems   Constitutional:  Positive for activity change and fatigue. Negative for chills and fever.   HENT:  Negative for congestion and trouble swallowing.    Eyes:  Negative for visual disturbance.   Respiratory:  Negative for cough and shortness of breath.    Cardiovascular:  Negative for chest pain and leg swelling.   Gastrointestinal:  Negative for abdominal distention, abdominal pain, nausea and vomiting.   Endocrine: Negative for cold intolerance, heat intolerance, polydipsia and polyuria.   Genitourinary:  Positive for flank pain. Negative for difficulty urinating and dysuria.   Musculoskeletal:  Negative for back pain and myalgias.   Skin:  Negative for rash and wound.   Neurological:  Positive for weakness. Negative for dizziness and light-headedness.   Hematological:  Negative for adenopathy. Does not bruise/bleed easily.   Psychiatric/Behavioral:  Negative for confusion and sleep disturbance.    Objective:     Vital Signs (Most Recent):  Temp: 98.2 °F (36.8 °C) (06/03/23 0736)  Pulse: 81 (06/03/23 0736)  Resp: 14 (06/03/23 0736)  BP: 116/60 (06/03/23 0736)  SpO2: 97 % (06/03/23 0736) Vital Signs (24h Range):  Temp:  [97.8 °F (36.6 °C)-98.2 °F (36.8 °C)] 98.2 °F (36.8 °C)  Pulse:  [78-81] 81  Resp:  [14-20] 14  SpO2:  [96 %-98 %] 97 %  BP: (114-125)/(60-72) 116/60     Weight: 83 kg (183 lb)  Body mass index is 27.02 kg/m².    Intake/Output Summary (Last 24 hours) at 6/3/2023 0905  Last data filed at 6/3/2023 0545  Gross per 24 hour   Intake --   Output 675 ml   Net -675 ml           Physical Exam  Constitutional:       Appearance: She is well-developed.   HENT:      Head: Normocephalic and atraumatic.   Eyes:      General: No scleral icterus.     Pupils: Pupils are equal, round, and reactive to light.   Neck:      Vascular: No JVD.   Cardiovascular:      Rate and Rhythm: Normal rate and regular rhythm.      Heart sounds: No murmur heard.    No friction rub. No  gallop.   Pulmonary:      Effort: Pulmonary effort is normal. No respiratory distress.      Breath sounds: Normal breath sounds. No wheezing or rales.   Abdominal:      General: Bowel sounds are normal. There is no distension.      Palpations: Abdomen is soft.      Tenderness: There is no abdominal tenderness. There is no guarding or rebound.      Comments: Ileostomy tube with normal output   Genitourinary:     Comments: Left nephrostomy tube with normal output   Musculoskeletal:         General: No deformity. Normal range of motion.      Cervical back: Neck supple.   Lymphadenopathy:      Cervical: No cervical adenopathy.   Skin:     General: Skin is warm and dry.      Capillary Refill: Capillary refill takes less than 2 seconds.      Findings: No erythema or rash.   Neurological:      Mental Status: She is alert and oriented to person, place, and time.      Cranial Nerves: No cranial nerve deficit.      Sensory: No sensory deficit.   Psychiatric:         Mood and Affect: Mood normal.           Significant Labs: A1C: No results for input(s): HGBA1C in the last 4320 hours.  Blood Culture:   No results for input(s): LABBLOO in the last 48 hours.    CBC:   Recent Labs   Lab 06/02/23  0549 06/03/23  0339   WBC 5.99 7.03   HGB 11.5* 11.7*   HCT 38.2 39.9    325       CMP:   Recent Labs   Lab 06/02/23  0549 06/03/23  0339    140   K 3.9 3.9    113*   CO2 19* 19*   GLU 85 85   BUN 18 16   CREATININE 1.3 1.3   CALCIUM 9.5 9.6   ANIONGAP 10 8       Urine Studies:   No results for input(s): COLORU, APPEARANCEUA, PHUR, SPECGRAV, PROTEINUA, GLUCUA, KETONESU, BILIRUBINUA, OCCULTUA, NITRITE, UROBILINOGEN, LEUKOCYTESUR, RBCUA, WBCUA, BACTERIA, SQUAMEPITHEL, HYALINECASTS in the last 48 hours.    Invalid input(s): [facility]      Microbiology Results (last 7 days)       Procedure Component Value Units Date/Time    Blood culture x two cultures. Draw prior to antibiotics. [[id number]] Collected: 05/30/23 4291     Order Status: Completed Specimen: Blood from Peripheral, Wrist, Right Updated: 06/02/23 2022     Blood Culture, Routine No Growth to date      No Growth to date      No Growth to date      No Growth to date    Narrative:      Aerobic and anaerobic    Blood culture x two cultures. Draw prior to antibiotics. [[id number]] Collected: 05/30/23 1900    Order Status: Completed Specimen: Blood from Peripheral, Wrist, Left Updated: 06/02/23 2022     Blood Culture, Routine No Growth to date      No Growth to date      No Growth to date      No Growth to date    Narrative:      Aerobic and anaerobic    Urine culture [[id number]]  (Abnormal)  (Susceptibility) Collected: 05/30/23 2107    Order Status: Completed Specimen: Urine Updated: 06/01/23 2251     Urine Culture, Routine ENTEROBACTER CLOACAE  > 100,000 cfu/ml      Narrative:      Specimen Source->Urine              Significant Imaging: I have reviewed all pertinent imaging results/findings within the past 24 hours.

## 2023-06-03 NOTE — PLAN OF CARE
[facility] Surg  Facility Transfer Orders        Admit to: [facility]    Diagnoses:   Active Hospital Problems    Diagnosis  POA    *UTI (urinary tract infection) [N39.0]  Yes     Priority: 1 - High    ODESSA (acute kidney injury) [N17.9]  Yes     Priority: 2     Ileostomy in place [Z93.2]  Not Applicable     Priority: 3      Chronic    Nephrostomy status [Z93.6]  Not Applicable     Priority: 5      Chronic    Major depressive disorder, recurrent episode, moderate [F33.1]  Yes     Priority: 7       Resolved Hospital Problems   No resolved problems to display.     Allergies:   Review of patient's allergies indicates:   Allergen Reactions    Bee sting [allergen ext-venom-honey bee]      Rash      Grass pollen-bermuda, standard      rash       Code Status: FULL    Vitals: Routine       Diet: regular diet    Activity: Up in a chair each morning as tolerated and Activity as tolerated    Nursing Precautions: Aspiration  and Fall    Bed/Surface: Low Air Loss    Pending Diagnostic Studies:       None            Miscellaneous Care:   Routine Skin for Bedridden Patients:  Apply moisture barrier cream to all    IV Access: Mid-line     Medications: Discontinue all previous medication orders, if any. See new list below.  Current Discharge Medication List        START taking these medications    Details   mirtazapine (REMERON) 30 MG tablet Take 1 tablet (30 mg total) by mouth nightly.  Qty: 30 tablet, Refills: 11      oxyCODONE (ROXICODONE) 5 MG immediate release tablet Take 1 tablet (5 mg total) by mouth every 12 (twelve) hours as needed for Pain.  Qty: 20 tablet, Refills: 0    Comments: Quantity prescribed more than 7 day supply? No      sodium chloride 0.9 % PgBk 100 mL with ertapenem 1 gram SolR 1 g Inject 1 g into the vein once daily. for 6 days    Comments: EOC ( stop day) 6/9/23           CONTINUE these medications which have NOT CHANGED    Details   gabapentin (NEURONTIN) 100 MG capsule Take 2 capsules (200 mg  total) by mouth every evening.  Qty: 90 capsule, Refills: 3      tamsulosin (FLOMAX) 0.4 mg Cap Take 1 capsule (0.4 mg total) by mouth once daily. For spasm associated with nephrostomy tubes  Qty: 30 capsule, Refills: 0      tolterodine (DETROL LA) 2 MG Cp24 Take 2 mg by mouth once daily.           STOP taking these medications       DULoxetine 60 mg CDRS Comments:   Reason for Stopping:         risperiDONE (RISPERDAL) 0.5 MG Tab Comments:   Reason for Stopping:         traZODone (DESYREL) 100 MG tablet Comments:   Reason for Stopping:         acetaminophen (TYLENOL) 500 MG tablet Comments:   Reason for Stopping:         albuterol (VENTOLIN HFA) 90 mcg/actuation inhaler Comments:   Reason for Stopping:         oxybutynin (DITROPAN-XL) 10 MG 24 hr tablet Comments:   Reason for Stopping:             Follow up:       Immunizations Administered as of 6/3/2023       No immunizations on file.            Mell Adhikari MD

## 2023-06-03 NOTE — SUBJECTIVE & OBJECTIVE
Interval History: Patient lying in bed, no acute distress. No acute events overnight. Patient reports continued fatigue and bilateral flank pain that is better controlled. Per urology, given good renal function, no indication for inpatient placement of right nephrostomy tube for the moderate right hyrdrouteronephrosis. Per ID, switched to ertapenem based on urine culture which grew Enterobacter and will complete 10 day total course. Midline consult placed. Psychiatry following and remeron increased to 30 mg nightly. Psychology following and SW obtaining records from [facility] in [place] for med list.    Review of Systems   Constitutional:  Positive for activity change and fatigue. Negative for chills and fever.   HENT:  Negative for congestion and trouble swallowing.    Eyes:  Negative for visual disturbance.   Respiratory:  Negative for cough and shortness of breath.    Cardiovascular:  Negative for chest pain and leg swelling.   Gastrointestinal:  Negative for abdominal distention, abdominal pain, nausea and vomiting.   Endocrine: Negative for cold intolerance, heat intolerance, polydipsia and polyuria.   Genitourinary:  Positive for flank pain. Negative for difficulty urinating and dysuria.   Musculoskeletal:  Negative for back pain and myalgias.   Skin:  Negative for rash and wound.   Neurological:  Positive for weakness. Negative for dizziness and light-headedness.   Hematological:  Negative for adenopathy. Does not bruise/bleed easily.   Psychiatric/Behavioral:  Negative for confusion and sleep disturbance.    Objective:     Vital Signs (Most Recent):  Temp: 98.1 °F (36.7 °C) (06/02/23 1932)  Pulse: 81 (06/02/23 1932)  Resp: 18 (06/02/23 1932)  BP: 114/68 (06/02/23 1932)  SpO2: 97 % (06/02/23 1932) Vital Signs (24h Range):  Temp:  [97.3 °F (36.3 °C)-98.1 °F (36.7 °C)] 98.1 °F (36.7 °C)  Pulse:  [73-81] 81  Resp:  [14-18] 18  SpO2:  [96 %-98 %] 97 %  BP: (100-128)/(53-68) 114/68     Weight: 83 kg  (183 lb)  Body mass index is 27.02 kg/m².    Intake/Output Summary (Last 24 hours) at 6/2/2023 2155  Last data filed at 6/2/2023 1022  Gross per 24 hour   Intake --   Output 1750 ml   Net -1750 ml           Physical Exam  Constitutional:       Appearance: She is well-developed.   HENT:      Head: Normocephalic and atraumatic.   Eyes:      General: No scleral icterus.     Pupils: Pupils are equal, round, and reactive to light.   Neck:      Vascular: No JVD.   Cardiovascular:      Rate and Rhythm: Normal rate and regular rhythm.      Heart sounds: No murmur heard.    No friction rub. No gallop.   Pulmonary:      Effort: Pulmonary effort is normal. No respiratory distress.      Breath sounds: Normal breath sounds. No wheezing or rales.   Abdominal:      General: Bowel sounds are normal. There is no distension.      Palpations: Abdomen is soft.      Tenderness: There is no abdominal tenderness. There is no guarding or rebound.      Comments: Ileostomy tube with normal output   Genitourinary:     Comments: Left nephrostomy tube with normal output   Musculoskeletal:         General: No deformity. Normal range of motion.      Cervical back: Neck supple.   Lymphadenopathy:      Cervical: No cervical adenopathy.   Skin:     General: Skin is warm and dry.      Capillary Refill: Capillary refill takes less than 2 seconds.      Findings: No erythema or rash.   Neurological:      Mental Status: She is alert and oriented to person, place, and time.      Cranial Nerves: No cranial nerve deficit.      Sensory: No sensory deficit.   Psychiatric:         Mood and Affect: Mood normal.           Significant Labs: A1C: No results for input(s): HGBA1C in the last 4320 hours.  Blood Culture:   No results for input(s): LABBLOO in the last 48 hours.    CBC:   Recent Labs   Lab 06/01/23  0519 06/02/23  0549   WBC 6.60 5.99   HGB 10.8* 11.5*   HCT 35.9* 38.2    293       CMP:   Recent Labs   Lab 06/01/23  0519 06/02/23  0549     139   K 4.0 3.9   * 110   CO2 20* 19*   GLU 97 85   BUN 21* 18   CREATININE 1.4 1.3   CALCIUM 9.3 9.5   ANIONGAP 6* 10       Urine Studies:   No results for input(s): COLORU, APPEARANCEUA, PHUR, SPECGRAV, PROTEINUA, GLUCUA, KETONESU, BILIRUBINUA, OCCULTUA, NITRITE, UROBILINOGEN, LEUKOCYTESUR, RBCUA, WBCUA, BACTERIA, SQUAMEPITHEL, HYALINECASTS in the last 48 hours.    Invalid input(s): [facility]      Microbiology Results (last 7 days)       Procedure Component Value Units Date/Time    Blood culture x two cultures. Draw prior to antibiotics. [[id number]] Collected: 05/30/23 1845    Order Status: Completed Specimen: Blood from Peripheral, Wrist, Right Updated: 06/02/23 2022     Blood Culture, Routine No Growth to date      No Growth to date      No Growth to date      No Growth to date    Narrative:      Aerobic and anaerobic    Blood culture x two cultures. Draw prior to antibiotics. [[id number]] Collected: 05/30/23 1900    Order Status: Completed Specimen: Blood from Peripheral, Wrist, Left Updated: 06/02/23 2022     Blood Culture, Routine No Growth to date      No Growth to date      No Growth to date      No Growth to date    Narrative:      Aerobic and anaerobic    Urine culture [[id number]]  (Abnormal)  (Susceptibility) Collected: 05/30/23 2107    Order Status: Completed Specimen: Urine Updated: 06/01/23 2251     Urine Culture, Routine ENTEROBACTER CLOACAE  > 100,000 cfu/ml      Narrative:      Specimen Source->Urine              Significant Imaging: I have reviewed all pertinent imaging results/findings within the past 24 hours.

## 2023-06-03 NOTE — PROGRESS NOTES
"CONSULTATION LIAISON PSYCHIATRY PROGRESS NOTE    Patient Name: Edita Riley  MRN: [id number]  Patient Class: OP- Observation  Admission Date: 5/30/2023  Attending Physician: Dioni Brush MD      SUBJECTIVE:   Edita Riley is a 58 y.o. female with past psychiatric history of depression & past pertinent medical history of cervical cancer s/p transverse colon conduit, bilateral nephrostomy tubes c/b recurrent pyelonephritis who presents to the ED/admitted to the hospital for UTI (urinary tract infection). She was transferred from [facility] where she is admitted under [facility] for depression and suicidal ideation.      Psychiatry consulted for "h/o depression admitted for recurrent UTI. Recent voluntary admission to [facility] for SI after her daughter committed suicide on 5/11/2023. PEC orderd. Consult for management of depression and SI."    Patient seen and chart reviewed. No acute overnight events and did not require any behavioral PRNs. Upon interview, pt calm, cooperative, engaged. Thoughts linear. bCAM(-). Reports that she's in the hospital for kidney infection. Reports mood at "not good" but endorses sleeping and eating well. Pt continues to endorse SI and amenable to plan for inpatient psychiatric admission.        OBJECTIVE:    Mental Status Exam:  General Appearance:  appears stated age, fair grooming, casually dressed in hospital gown  Behavior: cooperative, friendly, appropriate eye-contact  Involuntary Movements and Motor Activity: no abnormal involuntary movements noted; no tics, no tremors, no akathisia, no dystonia, no evidence of tardive dyskinesia; no psychomotor agitation or retardation  Gait and Station: unable to assess - patient lying down or seated  Speech and Language: intact; normal rate, rhythm, volume and dysphoric tone; conversational, spontaneous, and coherent; speaks and understands English proficiently and fluently; repeats words and phrases, no word " "finding difficulties are noted  Mood: "depressed"   Affect:  mood-congruent, dysphoric  Thought Process and Associations: linear, goal-directed, organized  Thought Content and Perceptions::  +SI with plan; denies HI; +AH of  daughter; denies VH  Sensorium and Orientation: intact; alert with clear sensorium; oriented fully to person, place, time and situation  Recent and Remote Memory: grossly intact, able to recall relevant and salient information from the recent and remote past  Attention and Concentration: intact, attentive to conversation, able to spell WORLD forwards and backwards  Fund of Knowledge: grossly intact, used appropriate vocabulary and demonstrated an awareness of current events, consistent with educational level achieved  Insight: demonstrates awareness of illness + situation  Judgment: compliant with health provider's recommendations and instructions     CAM ICU positive? no      ASSESSMENT & RECOMMENDATIONS   MDD, recurrent, severe  Complicated bereavement    PSYCH MEDICATIONS  Scheduled  - Continue Remeron 30 mg qHS      RISK ASSESSMENT  NEEDS PEC for SI & NEEDS 1:1 sitter  PEC/CEC  2023 @ 6:20pm    FOLLOW UP  Will follow up while in house    DISPOSITION - once medically cleared:  Seek involuntary inpatient psychiatric admission for stabilization of acute psychiatric symptoms and a safe disposition plan is enacted. The patient &/or their family was informed that the patient will be transferred to an inpatient unit per ED placement team.     Please contact ON CALL psychiatry service () for any acute issues that may arise.    Case discussed with staff: Dr. Donis Nazario   Psychiatry  [facility]  6/3/2023 11:41 " AM        --------------------------------------------------------------------------------------------------------------------------------------------------------------------------------------------------------------------------------------    CONTINUED OBJECTIVE clinical data & findings reviewed and noted for above decision making    Current Medications:   Scheduled Meds:    enoxparin  40 mg Subcutaneous Daily    ertapenem (INVANZ) IVPB  1 g Intravenous Q24H    gabapentin  200 mg Oral QHS    mirtazapine  30 mg Oral Nightly    tamsulosin  0.4 mg Oral Daily     PRN Meds: acetaminophen, butalbital-acetaminophen-caffeine -40 mg, morphine, oxyCODONE, oxyCODONE, sodium chloride 0.9%    Allergies:   Review of patient's allergies indicates:   Allergen Reactions    Bee sting [allergen ext-venom-honey bee]      Rash      Grass pollen-bermuda, standard      rash       Vitals  Vitals:    06/03/23 0736   BP: 116/60   Pulse: 81   Resp: 14   Temp: 98.2 °F (36.8 °C)       Labs/Imaging/Studies:  Recent Results (from the past 24 hour(s))   Basic Metabolic Panel    Collection Time: 06/03/23  3:39 AM   Result Value Ref Range    Sodium 140 136 - 145 mmol/L    Potassium 3.9 3.5 - 5.1 mmol/L    Chloride 113 (H) 95 - 110 mmol/L    CO2 19 (L) 23 - 29 mmol/L    Glucose 85 70 - 110 mg/dL    BUN 16 6 - 20 mg/dL    Creatinine 1.3 0.5 - 1.4 mg/dL    Calcium 9.6 8.7 - 10.5 mg/dL    Anion Gap 8 8 - 16 mmol/L    eGFR 47.1 (A) >60 mL/min/1.73 m^2   CBC auto differential    Collection Time: 06/03/23  3:39 AM   Result Value Ref Range    WBC 7.03 3.90 - 12.70 K/uL    RBC 4.34 4.00 - 5.40 M/uL    Hemoglobin 11.7 (L) 12.0 - 16.0 g/dL    Hematocrit 39.9 37.0 - 48.5 %    MCV 92 82 - 98 fL    MCH 27.0 27.0 - 31.0 pg    MCHC 29.3 (L) 32.0 - 36.0 g/dL    RDW 15.4 (H) 11.5 - 14.5 %    Platelets 325 150 - 450 K/uL    MPV 9.7 9.2 - 12.9 fL    Immature Granulocytes 0.3 0.0 - 0.5 %    Gran # (ANC) 4.0 1.8 - 7.7 K/uL    Immature Grans (Abs) 0.02 0.00 - 0.04  K/uL    Lymph # 1.8 1.0 - 4.8 K/uL    Mono # 0.9 0.3 - 1.0 K/uL    Eos # 0.2 0.0 - 0.5 K/uL    Baso # 0.04 0.00 - 0.20 K/uL    nRBC 0 0 /100 WBC    Gran % 57.1 38.0 - 73.0 %    Lymph % 26.0 18.0 - 48.0 %    Mono % 13.2 4.0 - 15.0 %    Eosinophil % 2.8 0.0 - 8.0 %    Basophil % 0.6 0.0 - 1.9 %    Differential Method Automated      Imaging Results    None

## 2023-06-03 NOTE — PROGRESS NOTES
[facility] Medicine  Progress Note    Patient Name: Edita Riley  MRN: [id number]  Patient Class: OP- Observation   Admission Date: 5/30/2023  Length of Stay: 0 days  Attending Physician: Dioni Brush MD  Primary Care Provider: Primary Doctor No        Subjective:     Principal Problem:UTI (urinary tract infection)        HPI:  58 year old female with Hx of distal ureteral strictures due to XRT for cervical cancer s/p transverse colon conduit and B/L nephrostomy tubes complicated by MDR infections who presents to the ED for UTI. Patient was discharged last month after presenting with recurrent UTI (E. Faecium VRE). History limited form patient. She had undergone right PCN drain removal and stone extraction with drain replacement. Completed a course of Ertapenem 1 g IV daily for 10 days and Linezolid and was discharged on Bactrim after ID evaluation. Patient denies any fever, chills, nausea, vomiting, hematemesis, cough, chest pain, palpitations, shortness of breath, abdominal pain/distension, changes in bowel or urinary habits, no hematochezia or melena.      Overview/Hospital Course:  Per urology, given good renal function, no indication for inpatient placement of right nephrostomy tube for the moderate right hyrdrouteronephrosis. Per ID, switched to ertapenem based on urine culture which grew Enterobacter and will complete 10 day total course. Midline consult placed. Psychiatry following and remeron increased to 30 mg nightly. Psychology following and SW obtaining records from [facility] in [place] for med list.      Interval History: Patient lying in bed, no acute distress. No acute events overnight. Patient reports continued fatigue and bilateral flank pain that is better controlled. Per urology, given good renal function, no indication for inpatient placement of right nephrostomy tube for the moderate right hyrdrouteronephrosis. Per ID, switched to ertapenem based  on urine culture which grew Enterobacter and will complete 10 day total course. Midline consult placed. Psychiatry following and remeron increased to 30 mg nightly. Psychology following and SW obtaining records from [facility] in [place] for med list.    Review of Systems   Constitutional:  Positive for activity change and fatigue. Negative for chills and fever.   HENT:  Negative for congestion and trouble swallowing.    Eyes:  Negative for visual disturbance.   Respiratory:  Negative for cough and shortness of breath.    Cardiovascular:  Negative for chest pain and leg swelling.   Gastrointestinal:  Negative for abdominal distention, abdominal pain, nausea and vomiting.   Endocrine: Negative for cold intolerance, heat intolerance, polydipsia and polyuria.   Genitourinary:  Positive for flank pain. Negative for difficulty urinating and dysuria.   Musculoskeletal:  Negative for back pain and myalgias.   Skin:  Negative for rash and wound.   Neurological:  Positive for weakness. Negative for dizziness and light-headedness.   Hematological:  Negative for adenopathy. Does not bruise/bleed easily.   Psychiatric/Behavioral:  Negative for confusion and sleep disturbance.    Objective:     Vital Signs (Most Recent):  Temp: 98.1 °F (36.7 °C) (06/02/23 1932)  Pulse: 81 (06/02/23 1932)  Resp: 18 (06/02/23 1932)  BP: 114/68 (06/02/23 1932)  SpO2: 97 % (06/02/23 1932) Vital Signs (24h Range):  Temp:  [97.3 °F (36.3 °C)-98.1 °F (36.7 °C)] 98.1 °F (36.7 °C)  Pulse:  [73-81] 81  Resp:  [14-18] 18  SpO2:  [96 %-98 %] 97 %  BP: (100-128)/(53-68) 114/68     Weight: 83 kg (183 lb)  Body mass index is 27.02 kg/m².    Intake/Output Summary (Last 24 hours) at 6/2/2023 2155  Last data filed at 6/2/2023 1022  Gross per 24 hour   Intake --   Output 1750 ml   Net -1750 ml           Physical Exam  Constitutional:       Appearance: She is well-developed.   HENT:      Head: Normocephalic and atraumatic.   Eyes:      General: No  scleral icterus.     Pupils: Pupils are equal, round, and reactive to light.   Neck:      Vascular: No JVD.   Cardiovascular:      Rate and Rhythm: Normal rate and regular rhythm.      Heart sounds: No murmur heard.    No friction rub. No gallop.   Pulmonary:      Effort: Pulmonary effort is normal. No respiratory distress.      Breath sounds: Normal breath sounds. No wheezing or rales.   Abdominal:      General: Bowel sounds are normal. There is no distension.      Palpations: Abdomen is soft.      Tenderness: There is no abdominal tenderness. There is no guarding or rebound.      Comments: Ileostomy tube with normal output   Genitourinary:     Comments: Left nephrostomy tube with normal output   Musculoskeletal:         General: No deformity. Normal range of motion.      Cervical back: Neck supple.   Lymphadenopathy:      Cervical: No cervical adenopathy.   Skin:     General: Skin is warm and dry.      Capillary Refill: Capillary refill takes less than 2 seconds.      Findings: No erythema or rash.   Neurological:      Mental Status: She is alert and oriented to person, place, and time.      Cranial Nerves: No cranial nerve deficit.      Sensory: No sensory deficit.   Psychiatric:         Mood and Affect: Mood normal.           Significant Labs: A1C: No results for input(s): HGBA1C in the last 4320 hours.  Blood Culture:   No results for input(s): LABBLOO in the last 48 hours.    CBC:   Recent Labs   Lab 06/01/23  0519 06/02/23  0549   WBC 6.60 5.99   HGB 10.8* 11.5*   HCT 35.9* 38.2    293       CMP:   Recent Labs   Lab 06/01/23  0519 06/02/23  0549    139   K 4.0 3.9   * 110   CO2 20* 19*   GLU 97 85   BUN 21* 18   CREATININE 1.4 1.3   CALCIUM 9.3 9.5   ANIONGAP 6* 10       Urine Studies:   No results for input(s): COLORU, APPEARANCEUA, PHUR, SPECGRAV, PROTEINUA, GLUCUA, KETONESU, BILIRUBINUA, OCCULTUA, NITRITE, UROBILINOGEN, LEUKOCYTESUR, RBCUA, WBCUA, BACTERIA, SQUAMEPITHEL, HYALINECASTS in  the last 48 hours.    Invalid input(s): [facility]      Microbiology Results (last 7 days)       Procedure Component Value Units Date/Time    Blood culture x two cultures. Draw prior to antibiotics. [[id number]] Collected: 05/30/23 1845    Order Status: Completed Specimen: Blood from Peripheral, Wrist, Right Updated: 06/02/23 2022     Blood Culture, Routine No Growth to date      No Growth to date      No Growth to date      No Growth to date    Narrative:      Aerobic and anaerobic    Blood culture x two cultures. Draw prior to antibiotics. [[id number]] Collected: 05/30/23 1900    Order Status: Completed Specimen: Blood from Peripheral, Wrist, Left Updated: 06/02/23 2022     Blood Culture, Routine No Growth to date      No Growth to date      No Growth to date      No Growth to date    Narrative:      Aerobic and anaerobic    Urine culture [[id number]]  (Abnormal)  (Susceptibility) Collected: 05/30/23 2107    Order Status: Completed Specimen: Urine Updated: 06/01/23 2251     Urine Culture, Routine ENTEROBACTER CLOACAE  > 100,000 cfu/ml      Narrative:      Specimen Source->Urine              Significant Imaging: I have reviewed all pertinent imaging results/findings within the past 24 hours.      Assessment/Plan:      * UTI (urinary tract infection)  Bilateral flank pain  - Recurrent Urinary tact infection related to nephrostomy tube and chronic uretral strictures  - Follow up urine cultures and blood cultures  - Urine culture 4/10 growing klebsiella and enterococcus.  - followup urine cultures   - prn percocet and prn IV morphine   - ID consulted. apprec recs  -- urine cx grew Enterobacter   -- changed meropenem to ertapenem to complete 10 day course    Major depressive disorder, recurrent episode, moderate  Patient has persistent depression which is severe and is currently uncontrolled. Will Increase anti-depressant medications. We will consult psychiatry at this time. Patient does not display psychosis at this time.  Continue to monitor closely and adjust plan of care as needed.  - patient's daughter committed suicide recently   - psychiatry and psychology following, apprec recs  - remeron 30 mg nightly   - SW obtaining medical records from [facility] in [place]      Nephrostomy status  - S/p Bilateral Nephrostomy tubes; R. Removed after recent complication and remains with L.Nephrostomy  - Continue with Flomax 0.4 mg, daily  - Follow up renal ultrasound  - per urology, no need for right nephrostomy tube placement given normal renal function. Outpatient urology followup    Ileostomy in place  - S/p Transverse colon conduit 2020 complicate by bowel perforation requiring hemicolectomy & ileostomy  - Follow up outpatient CRS    ODESSA (acute kidney injury)  ODESSA on CKD  - Scr on admit, 1.7 --> 1.4  - Monitor renal function  - Avoid Nephrotoxic agents  - Monitor urine output   - Follow up Renal U/S  - IMPROVING       VTE Risk Mitigation (From admission, onward)         Ordered     enoxaparin injection 40 mg  Daily         05/30/23 2228     IP VTE HIGH RISK PATIENT  Once         05/30/23 2228     Place sequential compression device  Until discontinued         05/30/23 2228                Discharge Planning   OK: 6/4/2023     Code Status: Full Code   Is the patient medically ready for discharge?: No    Reason for patient still in hospital (select all that apply): Patient trending condition, Laboratory test, Treatment and Consult recommendations  Discharge Plan A: Psychiatric hospital   Discharge Delays: None known at this time        Time spent in care of patient: > 35 minutes         Dioni Brush MD  Department of Hospital Medicine   [facility]

## 2023-06-04 LAB
ANION GAP SERPL CALC-SCNC: 10 MMOL/L (ref 8–16)
BACTERIA BLD CULT: NORMAL
BACTERIA BLD CULT: NORMAL
BASOPHILS # BLD AUTO: 0.02 K/UL (ref 0–0.2)
BASOPHILS NFR BLD: 0.3 % (ref 0–1.9)
BUN SERPL-MCNC: 15 MG/DL (ref 6–20)
CALCIUM SERPL-MCNC: 9.8 MG/DL (ref 8.7–10.5)
CHLORIDE SERPL-SCNC: 113 MMOL/L (ref 95–110)
CO2 SERPL-SCNC: 21 MMOL/L (ref 23–29)
CREAT SERPL-MCNC: 1.3 MG/DL (ref 0.5–1.4)
DIFFERENTIAL METHOD: ABNORMAL
EOSINOPHIL # BLD AUTO: 0.2 K/UL (ref 0–0.5)
EOSINOPHIL NFR BLD: 2.6 % (ref 0–8)
ERYTHROCYTE [DISTWIDTH] IN BLOOD BY AUTOMATED COUNT: 15.4 % (ref 11.5–14.5)
EST. GFR  (NO RACE VARIABLE): 47.1 ML/MIN/1.73 M^2
GLUCOSE SERPL-MCNC: 84 MG/DL (ref 70–110)
HCT VFR BLD AUTO: 36.9 % (ref 37–48.5)
HGB BLD-MCNC: 11.3 G/DL (ref 12–16)
IMM GRANULOCYTES # BLD AUTO: 0.02 K/UL (ref 0–0.04)
IMM GRANULOCYTES NFR BLD AUTO: 0.3 % (ref 0–0.5)
LYMPHOCYTES # BLD AUTO: 1.9 K/UL (ref 1–4.8)
LYMPHOCYTES NFR BLD: 27.2 % (ref 18–48)
MCH RBC QN AUTO: 27.1 PG (ref 27–31)
MCHC RBC AUTO-ENTMCNC: 30.6 G/DL (ref 32–36)
MCV RBC AUTO: 89 FL (ref 82–98)
MONOCYTES # BLD AUTO: 0.9 K/UL (ref 0.3–1)
MONOCYTES NFR BLD: 12.9 % (ref 4–15)
NEUTROPHILS # BLD AUTO: 4 K/UL (ref 1.8–7.7)
NEUTROPHILS NFR BLD: 56.7 % (ref 38–73)
NRBC BLD-RTO: 0 /100 WBC
PLATELET # BLD AUTO: 320 K/UL (ref 150–450)
PMV BLD AUTO: 9.9 FL (ref 9.2–12.9)
POTASSIUM SERPL-SCNC: 3.8 MMOL/L (ref 3.5–5.1)
RBC # BLD AUTO: 4.17 M/UL (ref 4–5.4)
SODIUM SERPL-SCNC: 144 MMOL/L (ref 136–145)
WBC # BLD AUTO: 6.96 K/UL (ref 3.9–12.7)

## 2023-06-04 PROCEDURE — 96372 THER/PROPH/DIAG INJ SC/IM: CPT | Performed by: STUDENT IN AN ORGANIZED HEALTH CARE EDUCATION/TRAINING PROGRAM

## 2023-06-04 PROCEDURE — 25000003 PHARM REV CODE 250: Performed by: PHYSICIAN ASSISTANT

## 2023-06-04 PROCEDURE — 11000001 HC ACUTE MED/SURG PRIVATE ROOM

## 2023-06-04 PROCEDURE — 99232 SBSQ HOSP IP/OBS MODERATE 35: CPT | Mod: AF,HB,, | Performed by: STUDENT IN AN ORGANIZED HEALTH CARE EDUCATION/TRAINING PROGRAM

## 2023-06-04 PROCEDURE — 25000003 PHARM REV CODE 250: Performed by: INTERNAL MEDICINE

## 2023-06-04 PROCEDURE — 36415 COLL VENOUS BLD VENIPUNCTURE: CPT | Performed by: STUDENT IN AN ORGANIZED HEALTH CARE EDUCATION/TRAINING PROGRAM

## 2023-06-04 PROCEDURE — G0378 HOSPITAL OBSERVATION PER HR: HCPCS

## 2023-06-04 PROCEDURE — 99232 SBSQ HOSP IP/OBS MODERATE 35: CPT | Mod: ,,, | Performed by: HOSPITALIST

## 2023-06-04 PROCEDURE — 63600175 PHARM REV CODE 636 W HCPCS: Performed by: STUDENT IN AN ORGANIZED HEALTH CARE EDUCATION/TRAINING PROGRAM

## 2023-06-04 PROCEDURE — 99232 PR SUBSEQUENT HOSPITAL CARE,LEVL II: ICD-10-PCS | Mod: AF,HB,, | Performed by: STUDENT IN AN ORGANIZED HEALTH CARE EDUCATION/TRAINING PROGRAM

## 2023-06-04 PROCEDURE — 63600175 PHARM REV CODE 636 W HCPCS: Performed by: PHYSICIAN ASSISTANT

## 2023-06-04 PROCEDURE — 25000003 PHARM REV CODE 250: Performed by: STUDENT IN AN ORGANIZED HEALTH CARE EDUCATION/TRAINING PROGRAM

## 2023-06-04 PROCEDURE — 85025 COMPLETE CBC W/AUTO DIFF WBC: CPT | Performed by: STUDENT IN AN ORGANIZED HEALTH CARE EDUCATION/TRAINING PROGRAM

## 2023-06-04 PROCEDURE — 80048 BASIC METABOLIC PNL TOTAL CA: CPT | Performed by: STUDENT IN AN ORGANIZED HEALTH CARE EDUCATION/TRAINING PROGRAM

## 2023-06-04 PROCEDURE — 99232 PR SUBSEQUENT HOSPITAL CARE,LEVL II: ICD-10-PCS | Mod: ,,, | Performed by: HOSPITALIST

## 2023-06-04 RX ADMIN — ENOXAPARIN SODIUM 40 MG: 40 INJECTION SUBCUTANEOUS at 04:06

## 2023-06-04 RX ADMIN — ERTAPENEM 1 G: 1 INJECTION INTRAMUSCULAR; INTRAVENOUS at 10:06

## 2023-06-04 RX ADMIN — MIRTAZAPINE 30 MG: 30 TABLET, FILM COATED ORAL at 09:06

## 2023-06-04 RX ADMIN — GABAPENTIN 200 MG: 100 CAPSULE ORAL at 09:06

## 2023-06-04 RX ADMIN — TAMSULOSIN HYDROCHLORIDE 0.4 MG: 0.4 CAPSULE ORAL at 08:06

## 2023-06-04 NOTE — PLAN OF CARE

## 2023-06-04 NOTE — ASSESSMENT & PLAN NOTE
ODESSA on CKD  - Scr on admit, 1.7 --> 1.4  - Monitor renal function  - Avoid Nephrotoxic agents  - Monitor urine output   - Follow up Renal U/S  - IMPROVING     6/4- cr 1.3. pt eating and drinking

## 2023-06-04 NOTE — ASSESSMENT & PLAN NOTE
Patient has persistent depression which is severe and is currently uncontrolled. Will Increase anti-depressant medications. We will consult psychiatry at this time. Patient does not display psychosis at this time. Continue to monitor closely and adjust plan of care as needed.  - patient's daughter committed suicide recently   - psychiatry and psychology following, apprec recs  - remeron 30 mg nightly   - SW obtaining medical records from [facility] in [place]    6/3 - per CM, the plan is to return to [facility]

## 2023-06-04 NOTE — PROGRESS NOTES
[facility] Medicine  Progress Note    Patient Name: Edita Riley  MRN: [id number]  Patient Class: OP- Observation   Admission Date: 5/30/2023  Length of Stay: 0 days  Attending Physician: Mell Adhikari MD  Primary Care Provider: Primary Doctor No        Subjective:     Principal Problem:UTI (urinary tract infection)        HPI:  58 year old female with Hx of distal ureteral strictures due to XRT for cervical cancer s/p transverse colon conduit and B/L nephrostomy tubes complicated by MDR infections who presents to the ED for UTI. Patient was discharged last month after presenting with recurrent UTI (E. Faecium VRE). History limited form patient. She had undergone right PCN drain removal and stone extraction with drain replacement. Completed a course of Ertapenem 1 g IV daily for 10 days and Linezolid and was discharged on Bactrim after ID evaluation. Patient denies any fever, chills, nausea, vomiting, hematemesis, cough, chest pain, palpitations, shortness of breath, abdominal pain/distension, changes in bowel or urinary habits, no hematochezia or melena.      Overview/Hospital Course:  Per urology, given good renal function, no indication for inpatient placement of right nephrostomy tube for the moderate right hyrdrouteronephrosis. Per ID, switched to ertapenem based on urine culture which grew Enterobacter and will complete 10 day total course. Midline consult placed. Psychiatry following and remeron increased to 30 mg nightly. Psychology following and SW obtaining records from [facility] in [place] for med list.    6/4-  ertapenem, EOC 6/9/23.  VSS.  Midline placed. She is Med Ready for discharge. PEC and CEC in place. CM working with [facility].        Interval History: see above    Review of Systems   Constitutional:  Positive for activity change and fatigue. Negative for chills and fever.   HENT:  Negative for congestion and trouble swallowing.    Eyes:   Negative for visual disturbance.   Respiratory:  Negative for cough and shortness of breath.    Cardiovascular:  Negative for chest pain and leg swelling.   Gastrointestinal:  Negative for abdominal distention, abdominal pain, nausea and vomiting.   Endocrine: Negative for cold intolerance, heat intolerance, polydipsia and polyuria.   Genitourinary:  Positive for flank pain. Negative for difficulty urinating and dysuria.   Musculoskeletal:  Negative for back pain and myalgias.   Skin:  Negative for rash and wound.   Neurological:  Positive for weakness. Negative for dizziness and light-headedness.   Hematological:  Negative for adenopathy. Does not bruise/bleed easily.   Psychiatric/Behavioral:  Negative for confusion and sleep disturbance.    Objective:     Vital Signs (Most Recent):  Temp: 97.9 °F (36.6 °C) (06/04/23 1004)  Pulse: 81 (06/04/23 1004)  Resp: 18 (06/04/23 1004)  BP: 114/62 (06/04/23 1004)  SpO2: 97 % (06/04/23 1004) Vital Signs (24h Range):  Temp:  [96.2 °F (35.7 °C)-98 °F (36.7 °C)] 97.9 °F (36.6 °C)  Pulse:  [65-81] 81  Resp:  [14-18] 18  SpO2:  [94 %-99 %] 97 %  BP: (104-123)/(57-66) 114/62     Weight: 83 kg (183 lb)  Body mass index is 27.02 kg/m².    Intake/Output Summary (Last 24 hours) at 6/4/2023 1021  Last data filed at 6/4/2023 0443  Gross per 24 hour   Intake --   Output 1275 ml   Net -1275 ml           Physical Exam  Constitutional:       Appearance: She is well-developed.   HENT:      Head: Normocephalic and atraumatic.   Eyes:      General: No scleral icterus.     Pupils: Pupils are equal, round, and reactive to light.   Neck:      Vascular: No JVD.   Cardiovascular:      Rate and Rhythm: Normal rate and regular rhythm.      Heart sounds: No murmur heard.    No friction rub. No gallop.   Pulmonary:      Effort: Pulmonary effort is normal. No respiratory distress.      Breath sounds: Normal breath sounds. No wheezing or rales.   Abdominal:      General: Bowel sounds are normal. There is  no distension.      Palpations: Abdomen is soft.      Tenderness: There is no abdominal tenderness. There is no guarding or rebound.      Comments: Ileostomy tube with normal output   Genitourinary:     Comments: Left nephrostomy tube with normal output   Musculoskeletal:         General: No deformity. Normal range of motion.      Cervical back: Neck supple.   Lymphadenopathy:      Cervical: No cervical adenopathy.   Skin:     General: Skin is warm and dry.      Capillary Refill: Capillary refill takes less than 2 seconds.      Findings: No erythema or rash.   Neurological:      Mental Status: She is alert and oriented to person, place, and time.      Cranial Nerves: No cranial nerve deficit.      Sensory: No sensory deficit.   Psychiatric:         Mood and Affect: Mood normal.           Significant Labs: A1C: No results for input(s): HGBA1C in the last 4320 hours.  Blood Culture:   No results for input(s): LABBLOO in the last 48 hours.    CBC:   Recent Labs   Lab 06/03/23  0339 06/04/23  0555   WBC 7.03 6.96   HGB 11.7* 11.3*   HCT 39.9 36.9*    320       CMP:   Recent Labs   Lab 06/03/23  0339 06/04/23  0555    144   K 3.9 3.8   * 113*   CO2 19* 21*   GLU 85 84   BUN 16 15   CREATININE 1.3 1.3   CALCIUM 9.6 9.8   ANIONGAP 8 10           Microbiology Results (last 7 days)       Procedure Component Value Units Date/Time    Blood culture x two cultures. Draw prior to antibiotics. [[id number]] Collected: 05/30/23 1845    Order Status: Completed Specimen: Blood from Peripheral, Wrist, Right Updated: 06/03/23 2022     Blood Culture, Routine No Growth to date      No Growth to date      No Growth to date      No Growth to date      No Growth to date    Narrative:      Aerobic and anaerobic    Blood culture x two cultures. Draw prior to antibiotics. [[id number]] Collected: 05/30/23 1900    Order Status: Completed Specimen: Blood from Peripheral, Wrist, Left Updated: 06/03/23 2022     Blood Culture,  Routine No Growth to date      No Growth to date      No Growth to date      No Growth to date      No Growth to date    Narrative:      Aerobic and anaerobic    Urine culture [[id number]]  (Abnormal)  (Susceptibility) Collected: 05/30/23 2107    Order Status: Completed Specimen: Urine Updated: 06/01/23 2251     Urine Culture, Routine ENTEROBACTER CLOACAE  > 100,000 cfu/ml      Narrative:      Specimen Source->Urine              Significant Imaging: I have reviewed all pertinent imaging results/findings within the past 24 hours.      Assessment/Plan:      * UTI (urinary tract infection)  Bilateral flank pain  - Recurrent Urinary tact infection related to nephrostomy tube and chronic uretral strictures  - Follow up urine cultures and blood cultures  - Urine culture 4/10 growing klebsiella and enterococcus.  - followup urine cultures   - prn percocet and prn IV morphine   - ID consulted. apprec recs  -- urine cx grew Enterobacter   -- changed meropenem to ertapenem to complete 10 day course    6/4- stable. Stop day for ertepenum is 6/9. May be discharged back to [facility]. Has midline.     ODESSA (acute kidney injury)  ODESSA on CKD  - Scr on admit, 1.7 --> 1.4  - Monitor renal function  - Avoid Nephrotoxic agents  - Monitor urine output   - Follow up Renal U/S  - IMPROVING     6/4- cr 1.3. pt eating and drinking    Ileostomy in place  - S/p Transverse colon conduit 2020 complicate by bowel perforation requiring hemicolectomy & ileostomy  - Follow up outpatient CRS    Nephrostomy status  - S/p Bilateral Nephrostomy tubes; R. Removed after recent complication and remains with L.Nephrostomy  - Continue with Flomax 0.4 mg, daily  - Follow up renal ultrasound  - per urology, no need for right nephrostomy tube placement given normal renal function. Outpatient urology followup    Major depressive disorder, recurrent episode, moderate  Patient has persistent depression which is severe and is currently uncontrolled. Will  Increase anti-depressant medications. We will consult psychiatry at this time. Patient does not display psychosis at this time. Continue to monitor closely and adjust plan of care as needed.  - patient's daughter committed suicide recently   - psychiatry and psychology following, apprec recs  - remeron 30 mg nightly   - SW obtaining medical records from [facility] in [place]    6/3 - per CM, the plan is to return to [facility]        VTE Risk Mitigation (From admission, onward)         Ordered     enoxaparin injection 40 mg  Daily         05/30/23 2228     IP VTE HIGH RISK PATIENT  Once         05/30/23 2228     Place sequential compression device  Until discontinued         05/30/23 2228                Discharge Planning   OK: 6/3/2023     Code Status: Full Code   Is the patient medically ready for discharge?: Yes    Reason for patient still in hospital (select all that apply): Patient trending condition  Discharge Plan A: Psychiatric hospital   Discharge Delays: None known at this time      Mell Adhikari MD  Department of Hospital Medicine   [facility] - Med Surg

## 2023-06-04 NOTE — SUBJECTIVE & OBJECTIVE
Interval History: see above    Review of Systems   Constitutional:  Positive for activity change and fatigue. Negative for chills and fever.   HENT:  Negative for congestion and trouble swallowing.    Eyes:  Negative for visual disturbance.   Respiratory:  Negative for cough and shortness of breath.    Cardiovascular:  Negative for chest pain and leg swelling.   Gastrointestinal:  Negative for abdominal distention, abdominal pain, nausea and vomiting.   Endocrine: Negative for cold intolerance, heat intolerance, polydipsia and polyuria.   Genitourinary:  Positive for flank pain. Negative for difficulty urinating and dysuria.   Musculoskeletal:  Negative for back pain and myalgias.   Skin:  Negative for rash and wound.   Neurological:  Positive for weakness. Negative for dizziness and light-headedness.   Hematological:  Negative for adenopathy. Does not bruise/bleed easily.   Psychiatric/Behavioral:  Negative for confusion and sleep disturbance.    Objective:     Vital Signs (Most Recent):  Temp: 97.9 °F (36.6 °C) (06/04/23 1004)  Pulse: 81 (06/04/23 1004)  Resp: 18 (06/04/23 1004)  BP: 114/62 (06/04/23 1004)  SpO2: 97 % (06/04/23 1004) Vital Signs (24h Range):  Temp:  [96.2 °F (35.7 °C)-98 °F (36.7 °C)] 97.9 °F (36.6 °C)  Pulse:  [65-81] 81  Resp:  [14-18] 18  SpO2:  [94 %-99 %] 97 %  BP: (104-123)/(57-66) 114/62     Weight: 83 kg (183 lb)  Body mass index is 27.02 kg/m².    Intake/Output Summary (Last 24 hours) at 6/4/2023 1021  Last data filed at 6/4/2023 0443  Gross per 24 hour   Intake --   Output 1275 ml   Net -1275 ml           Physical Exam  Constitutional:       Appearance: She is well-developed.   HENT:      Head: Normocephalic and atraumatic.   Eyes:      General: No scleral icterus.     Pupils: Pupils are equal, round, and reactive to light.   Neck:      Vascular: No JVD.   Cardiovascular:      Rate and Rhythm: Normal rate and regular rhythm.      Heart sounds: No murmur heard.    No friction rub. No  gallop.   Pulmonary:      Effort: Pulmonary effort is normal. No respiratory distress.      Breath sounds: Normal breath sounds. No wheezing or rales.   Abdominal:      General: Bowel sounds are normal. There is no distension.      Palpations: Abdomen is soft.      Tenderness: There is no abdominal tenderness. There is no guarding or rebound.      Comments: Ileostomy tube with normal output   Genitourinary:     Comments: Left nephrostomy tube with normal output   Musculoskeletal:         General: No deformity. Normal range of motion.      Cervical back: Neck supple.   Lymphadenopathy:      Cervical: No cervical adenopathy.   Skin:     General: Skin is warm and dry.      Capillary Refill: Capillary refill takes less than 2 seconds.      Findings: No erythema or rash.   Neurological:      Mental Status: She is alert and oriented to person, place, and time.      Cranial Nerves: No cranial nerve deficit.      Sensory: No sensory deficit.   Psychiatric:         Mood and Affect: Mood normal.           Significant Labs: A1C: No results for input(s): HGBA1C in the last 4320 hours.  Blood Culture:   No results for input(s): LABBLOO in the last 48 hours.    CBC:   Recent Labs   Lab 06/03/23  0339 06/04/23  0555   WBC 7.03 6.96   HGB 11.7* 11.3*   HCT 39.9 36.9*    320       CMP:   Recent Labs   Lab 06/03/23  0339 06/04/23  0555    144   K 3.9 3.8   * 113*   CO2 19* 21*   GLU 85 84   BUN 16 15   CREATININE 1.3 1.3   CALCIUM 9.6 9.8   ANIONGAP 8 10           Microbiology Results (last 7 days)       Procedure Component Value Units Date/Time    Blood culture x two cultures. Draw prior to antibiotics. [[id number]] Collected: 05/30/23 1845    Order Status: Completed Specimen: Blood from Peripheral, Wrist, Right Updated: 06/03/23 2022     Blood Culture, Routine No Growth to date      No Growth to date      No Growth to date      No Growth to date      No Growth to date    Narrative:      Aerobic and anaerobic     Blood culture x two cultures. Draw prior to antibiotics. [[id number]] Collected: 05/30/23 1900    Order Status: Completed Specimen: Blood from Peripheral, Wrist, Left Updated: 06/03/23 2022     Blood Culture, Routine No Growth to date      No Growth to date      No Growth to date      No Growth to date      No Growth to date    Narrative:      Aerobic and anaerobic    Urine culture [[id number]]  (Abnormal)  (Susceptibility) Collected: 05/30/23 2107    Order Status: Completed Specimen: Urine Updated: 06/01/23 2251     Urine Culture, Routine ENTEROBACTER CLOACAE  > 100,000 cfu/ml      Narrative:      Specimen Source->Urine              Significant Imaging: I have reviewed all pertinent imaging results/findings within the past 24 hours.

## 2023-06-04 NOTE — ASSESSMENT & PLAN NOTE
Bilateral flank pain  - Recurrent Urinary tact infection related to nephrostomy tube and chronic uretral strictures  - Follow up urine cultures and blood cultures  - Urine culture 4/10 growing klebsiella and enterococcus.  - followup urine cultures   - prn percocet and prn IV morphine   - ID consulted. apprec recs  -- urine cx grew Enterobacter   -- changed meropenem to ertapenem to complete 10 day course    6/4- stable. Stop day for ertepenum is 6/9. May be discharged back to [facility]. Has midline.

## 2023-06-04 NOTE — PLAN OF CARE
Problem: Violence Risk or Actual  Goal: Anger and Impulse Control  6/4/2023 0650 by Janeth Murray RN  Outcome: Ongoing, Not Progressing  6/4/2023 0452 by Janeth Murray RN  Outcome: Ongoing, Progressing     Problem: Adult Inpatient Plan of Care  Goal: Plan of Care Review  6/4/2023 0650 by Janeth Murray RN  Outcome: Ongoing, Not Progressing  6/4/2023 0452 by Janeth Murray RN  Outcome: Ongoing, Progressing  Goal: Patient-Specific Goal (Individualized)  6/4/2023 0650 by Janeth Murray RN  Outcome: Ongoing, Not Progressing  6/4/2023 0452 by Janeth Murray RN  Outcome: Ongoing, Progressing  Goal: Absence of Hospital-Acquired Illness or Injury  6/4/2023 0650 by Janeth Murray RN  Outcome: Ongoing, Not Progressing  6/4/2023 0452 by Janeth Murray RN  Outcome: Ongoing, Progressing  Goal: Optimal Comfort and Wellbeing  6/4/2023 0650 by Janeth Murray RN  Outcome: Ongoing, Not Progressing  6/4/2023 0452 by Janeth Murray RN  Outcome: Ongoing, Progressing  Goal: Readiness for Transition of Care  6/4/2023 0650 by Janeth Murray RN  Outcome: Ongoing, Not Progressing  6/4/2023 0452 by Janeth Murray RN  Outcome: Ongoing, Progressing     Problem: Fluid and Electrolyte Imbalance (Acute Kidney Injury/Impairment)  Goal: Fluid and Electrolyte Balance  6/4/2023 0650 by Janeth Murray RN  Outcome: Ongoing, Not Progressing  6/4/2023 0452 by Janeth Murray RN  Outcome: Ongoing, Progressing     Problem: Oral Intake Inadequate (Acute Kidney Injury/Impairment)  Goal: Optimal Nutrition Intake  6/4/2023 0650 by Janeth Murray RN  Outcome: Ongoing, Not Progressing  6/4/2023 0452 by Janeth Murray RN  Outcome: Ongoing, Progressing     Problem: Renal Function Impairment (Acute Kidney Injury/Impairment)  Goal: Effective Renal Function  6/4/2023 0650 by Janeth Murray RN  Outcome: Ongoing, Not Progressing  6/4/2023 0452 by Janeth Murray RN  Outcome:  Ongoing, Progressing     Problem: Fall Injury Risk  Goal: Absence of Fall and Fall-Related Injury  6/4/2023 0650 by Janeth Murray RN  Outcome: Ongoing, Not Progressing  6/4/2023 0452 by Janeth Murray RN  Outcome: Ongoing, Progressing     Problem: Infection  Goal: Absence of Infection Signs and Symptoms  6/4/2023 0650 by Janeth Murray RN  Outcome: Ongoing, Not Progressing  6/4/2023 0452 by Janeth Murray RN  Outcome: Ongoing, Progressing

## 2023-06-04 NOTE — PROGRESS NOTES
"CONSULTATION LIAISON PSYCHIATRY PROGRESS NOTE    Patient Name: Edita Riley  MRN: [id number]  Patient Class: OP- Observation  Admission Date: 5/30/2023  Attending Physician: Mell Adhikari MD      SUBJECTIVE:   Edita Riley is a 58 y.o. female with past psychiatric history of depression & past pertinent medical history of cervical cancer s/p transverse colon conduit, bilateral nephrostomy tubes c/b recurrent pyelonephritis who presents to the ED/admitted to the hospital for UTI (urinary tract infection). She was transferred from [facility] where she is admitted under [facility] for depression and suicidal ideation.      Psychiatry consulted for "h/o depression admitted for recurrent UTI. Recent voluntary admission to [facility] for SI after her daughter committed suicide on 5/11/2023. PEC orderd. Consult for management of depression and SI."    Patient seen and chart reviewed. No acute overnight events and did not require any behavioral PRNs. Upon interview, pt calm, cooperative, engaged. Oriented x 3. Thoughts linear. bCAM(-). Reports that she's in the hospital for kidney infection. Reports mood is better and she is not currently experiencing SI. Says that she enjoys the food at this facility. No pain or complaints voiced.        OBJECTIVE:    Mental Status Exam:  General Appearance:  appears stated age, fair grooming, casually dressed in hospital gown  Behavior: cooperative, friendly, appropriate eye-contact  Involuntary Movements and Motor Activity: no abnormal involuntary movements noted; no tics, no tremors, no akathisia, no dystonia, no evidence of tardive dyskinesia; no psychomotor agitation or retardation  Gait and Station: unable to assess - patient lying down or seated  Speech and Language: intact; normal rate, rhythm, volume and dysphoric tone; conversational, spontaneous, and coherent; speaks and understands English proficiently and fluently; repeats words and phrases, no " "word finding difficulties are noted  Mood: "better"  Affect:  mood-congruent, dysphoric  Thought Process and Associations: linear, goal-directed, organized  Thought Content and Perceptions::  +SI with plan; denies HI; +AH of  daughter; denies VH  Sensorium and Orientation: intact; alert with clear sensorium; oriented fully to person, place, time and situation  Recent and Remote Memory: grossly intact, able to recall relevant and salient information from the recent and remote past  Attention and Concentration: intact, attentive to conversation, able to spell WORLD forwards and backwards  Fund of Knowledge: grossly intact, used appropriate vocabulary and demonstrated an awareness of current events, consistent with educational level achieved  Insight: demonstrates awareness of illness + situation  Judgment: compliant with health provider's recommendations and instructions     CAM ICU positive? no      ASSESSMENT & RECOMMENDATIONS   MDD, recurrent, severe  Complicated bereavement    PSYCH MEDICATIONS  Scheduled  - Continue Remeron 30 mg qHS    RISK ASSESSMENT  NEEDS PEC for SI & NEEDS 1:1 sitter  PEC/CEC  2023 @ 6:20pm    FOLLOW UP  Will follow up while in house    DISPOSITION - once medically cleared:  Seek involuntary inpatient psychiatric admission for stabilization of acute psychiatric symptoms and a safe disposition plan is enacted. The patient &/or their family was informed that the patient will be transferred to an inpatient unit per ED placement team.     Please contact ON CALL psychiatry service () for any acute issues that may arise.    Case discussed with staff: Dr. Donis Conway   Psychiatry  [facility]  2023 11:41 " AM        --------------------------------------------------------------------------------------------------------------------------------------------------------------------------------------------------------------------------------------    CONTINUED OBJECTIVE clinical data & findings reviewed and noted for above decision making    Current Medications:   Scheduled Meds:    enoxparin  40 mg Subcutaneous Daily    ertapenem (INVANZ) IVPB  1 g Intravenous Q24H    gabapentin  200 mg Oral QHS    mirtazapine  30 mg Oral Nightly    tamsulosin  0.4 mg Oral Daily     PRN Meds: acetaminophen, butalbital-acetaminophen-caffeine -40 mg, morphine, oxyCODONE, oxyCODONE, sodium chloride 0.9%    Allergies:   Review of patient's allergies indicates:   Allergen Reactions    Bee sting [allergen ext-venom-honey bee]      Rash      Grass pollen-bermuda, standard      rash       Vitals  Vitals:    06/04/23 1004   BP: 114/62   Pulse: 81   Resp: 18   Temp: 97.9 °F (36.6 °C)       Labs/Imaging/Studies:  Recent Results (from the past 24 hour(s))   Basic Metabolic Panel    Collection Time: 06/04/23  5:55 AM   Result Value Ref Range    Sodium 144 136 - 145 mmol/L    Potassium 3.8 3.5 - 5.1 mmol/L    Chloride 113 (H) 95 - 110 mmol/L    CO2 21 (L) 23 - 29 mmol/L    Glucose 84 70 - 110 mg/dL    BUN 15 6 - 20 mg/dL    Creatinine 1.3 0.5 - 1.4 mg/dL    Calcium 9.8 8.7 - 10.5 mg/dL    Anion Gap 10 8 - 16 mmol/L    eGFR 47.1 (A) >60 mL/min/1.73 m^2   CBC auto differential    Collection Time: 06/04/23  5:55 AM   Result Value Ref Range    WBC 6.96 3.90 - 12.70 K/uL    RBC 4.17 4.00 - 5.40 M/uL    Hemoglobin 11.3 (L) 12.0 - 16.0 g/dL    Hematocrit 36.9 (L) 37.0 - 48.5 %    MCV 89 82 - 98 fL    MCH 27.1 27.0 - 31.0 pg    MCHC 30.6 (L) 32.0 - 36.0 g/dL    RDW 15.4 (H) 11.5 - 14.5 %    Platelets 320 150 - 450 K/uL    MPV 9.9 9.2 - 12.9 fL    Immature Granulocytes 0.3 0.0 - 0.5 %    Gran # (ANC) 4.0 1.8 - 7.7 K/uL    Immature Grans (Abs) 0.02 0.00 -  0.04 K/uL    Lymph # 1.9 1.0 - 4.8 K/uL    Mono # 0.9 0.3 - 1.0 K/uL    Eos # 0.2 0.0 - 0.5 K/uL    Baso # 0.02 0.00 - 0.20 K/uL    nRBC 0 0 /100 WBC    Gran % 56.7 38.0 - 73.0 %    Lymph % 27.2 18.0 - 48.0 %    Mono % 12.9 4.0 - 15.0 %    Eosinophil % 2.6 0.0 - 8.0 %    Basophil % 0.3 0.0 - 1.9 %    Differential Method Automated      Imaging Results    None

## 2023-06-05 ENCOUNTER — TELEPHONE (OUTPATIENT)
Dept: UROLOGY | Facility: CLINIC | Age: 58
End: 2023-06-05
Payer: MEDICAID

## 2023-06-05 ENCOUNTER — ANESTHESIA EVENT (OUTPATIENT)
Dept: SURGERY | Facility: HOSPITAL | Age: 58
DRG: 699 | End: 2023-06-05
Payer: MEDICAID

## 2023-06-05 LAB
ANION GAP SERPL CALC-SCNC: 7 MMOL/L (ref 8–16)
BASOPHILS # BLD AUTO: 0.05 K/UL (ref 0–0.2)
BASOPHILS NFR BLD: 0.7 % (ref 0–1.9)
BUN SERPL-MCNC: 18 MG/DL (ref 6–20)
C DIFF GDH STL QL: NEGATIVE
C DIFF TOX A+B STL QL IA: NEGATIVE
CALCIUM SERPL-MCNC: 9.4 MG/DL (ref 8.7–10.5)
CHLORIDE SERPL-SCNC: 113 MMOL/L (ref 95–110)
CO2 SERPL-SCNC: 20 MMOL/L (ref 23–29)
CREAT SERPL-MCNC: 1.2 MG/DL (ref 0.5–1.4)
DIFFERENTIAL METHOD: ABNORMAL
EOSINOPHIL # BLD AUTO: 0.2 K/UL (ref 0–0.5)
EOSINOPHIL NFR BLD: 3.1 % (ref 0–8)
ERYTHROCYTE [DISTWIDTH] IN BLOOD BY AUTOMATED COUNT: 15.8 % (ref 11.5–14.5)
EST. GFR  (NO RACE VARIABLE): 51.8 ML/MIN/1.73 M^2
GLUCOSE SERPL-MCNC: 84 MG/DL (ref 70–110)
HCT VFR BLD AUTO: 39.1 % (ref 37–48.5)
HGB BLD-MCNC: 11.5 G/DL (ref 12–16)
IMM GRANULOCYTES # BLD AUTO: 0.01 K/UL (ref 0–0.04)
IMM GRANULOCYTES NFR BLD AUTO: 0.1 % (ref 0–0.5)
LYMPHOCYTES # BLD AUTO: 2 K/UL (ref 1–4.8)
LYMPHOCYTES NFR BLD: 28.8 % (ref 18–48)
MCH RBC QN AUTO: 28 PG (ref 27–31)
MCHC RBC AUTO-ENTMCNC: 29.4 G/DL (ref 32–36)
MCV RBC AUTO: 95 FL (ref 82–98)
MONOCYTES # BLD AUTO: 1 K/UL (ref 0.3–1)
MONOCYTES NFR BLD: 13.8 % (ref 4–15)
NEUTROPHILS # BLD AUTO: 3.8 K/UL (ref 1.8–7.7)
NEUTROPHILS NFR BLD: 53.5 % (ref 38–73)
NRBC BLD-RTO: 0 /100 WBC
PLATELET # BLD AUTO: 312 K/UL (ref 150–450)
PMV BLD AUTO: 10.3 FL (ref 9.2–12.9)
POTASSIUM SERPL-SCNC: 3.8 MMOL/L (ref 3.5–5.1)
RBC # BLD AUTO: 4.11 M/UL (ref 4–5.4)
SODIUM SERPL-SCNC: 140 MMOL/L (ref 136–145)
WBC # BLD AUTO: 7.08 K/UL (ref 3.9–12.7)
WBC #/AREA STL HPF: NORMAL /[HPF]

## 2023-06-05 PROCEDURE — 87449 NOS EACH ORGANISM AG IA: CPT | Mod: 91 | Performed by: INTERNAL MEDICINE

## 2023-06-05 PROCEDURE — 25000003 PHARM REV CODE 250: Performed by: STUDENT IN AN ORGANIZED HEALTH CARE EDUCATION/TRAINING PROGRAM

## 2023-06-05 PROCEDURE — 63600175 PHARM REV CODE 636 W HCPCS: Performed by: INTERNAL MEDICINE

## 2023-06-05 PROCEDURE — 87427 SHIGA-LIKE TOXIN AG IA: CPT | Mod: 59 | Performed by: INTERNAL MEDICINE

## 2023-06-05 PROCEDURE — 85025 COMPLETE CBC W/AUTO DIFF WBC: CPT | Performed by: STUDENT IN AN ORGANIZED HEALTH CARE EDUCATION/TRAINING PROGRAM

## 2023-06-05 PROCEDURE — 25000003 PHARM REV CODE 250: Performed by: PHYSICIAN ASSISTANT

## 2023-06-05 PROCEDURE — 27000207 HC ISOLATION

## 2023-06-05 PROCEDURE — 87449 NOS EACH ORGANISM AG IA: CPT | Performed by: INTERNAL MEDICINE

## 2023-06-05 PROCEDURE — 99232 SBSQ HOSP IP/OBS MODERATE 35: CPT | Mod: ,,, | Performed by: INTERNAL MEDICINE

## 2023-06-05 PROCEDURE — 11000001 HC ACUTE MED/SURG PRIVATE ROOM

## 2023-06-05 PROCEDURE — 87046 STOOL CULTR AEROBIC BACT EA: CPT | Performed by: INTERNAL MEDICINE

## 2023-06-05 PROCEDURE — 80048 BASIC METABOLIC PNL TOTAL CA: CPT | Performed by: STUDENT IN AN ORGANIZED HEALTH CARE EDUCATION/TRAINING PROGRAM

## 2023-06-05 PROCEDURE — 36415 COLL VENOUS BLD VENIPUNCTURE: CPT | Performed by: STUDENT IN AN ORGANIZED HEALTH CARE EDUCATION/TRAINING PROGRAM

## 2023-06-05 PROCEDURE — 25000003 PHARM REV CODE 250: Performed by: INTERNAL MEDICINE

## 2023-06-05 PROCEDURE — 89055 LEUKOCYTE ASSESSMENT FECAL: CPT | Performed by: INTERNAL MEDICINE

## 2023-06-05 PROCEDURE — 63600175 PHARM REV CODE 636 W HCPCS: Performed by: PHYSICIAN ASSISTANT

## 2023-06-05 PROCEDURE — 87045 FECES CULTURE AEROBIC BACT: CPT | Performed by: INTERNAL MEDICINE

## 2023-06-05 PROCEDURE — 99232 PR SUBSEQUENT HOSPITAL CARE,LEVL II: ICD-10-PCS | Mod: ,,, | Performed by: INTERNAL MEDICINE

## 2023-06-05 PROCEDURE — 99232 PR SUBSEQUENT HOSPITAL CARE,LEVL II: ICD-10-PCS | Mod: AF,HB,, | Performed by: PSYCHIATRY & NEUROLOGY

## 2023-06-05 PROCEDURE — 63600175 PHARM REV CODE 636 W HCPCS: Performed by: STUDENT IN AN ORGANIZED HEALTH CARE EDUCATION/TRAINING PROGRAM

## 2023-06-05 PROCEDURE — 99232 SBSQ HOSP IP/OBS MODERATE 35: CPT | Mod: AF,HB,, | Performed by: PSYCHIATRY & NEUROLOGY

## 2023-06-05 RX ORDER — MORPHINE SULFATE 4 MG/ML
4 INJECTION, SOLUTION INTRAMUSCULAR; INTRAVENOUS EVERY 6 HOURS PRN
Status: DISCONTINUED | OUTPATIENT
Start: 2023-06-05 | End: 2023-06-05

## 2023-06-05 RX ORDER — OXYCODONE HYDROCHLORIDE 10 MG/1
10 TABLET ORAL EVERY 4 HOURS PRN
Status: DISCONTINUED | OUTPATIENT
Start: 2023-06-05 | End: 2023-06-13 | Stop reason: HOSPADM

## 2023-06-05 RX ORDER — MORPHINE SULFATE 4 MG/ML
3 INJECTION, SOLUTION INTRAMUSCULAR; INTRAVENOUS EVERY 4 HOURS PRN
Status: DISCONTINUED | OUTPATIENT
Start: 2023-06-05 | End: 2023-06-13 | Stop reason: HOSPADM

## 2023-06-05 RX ADMIN — MORPHINE SULFATE 3 MG: 4 INJECTION INTRAVENOUS at 12:06

## 2023-06-05 RX ADMIN — TAMSULOSIN HYDROCHLORIDE 0.4 MG: 0.4 CAPSULE ORAL at 08:06

## 2023-06-05 RX ADMIN — ERTAPENEM 1 G: 1 INJECTION INTRAMUSCULAR; INTRAVENOUS at 09:06

## 2023-06-05 RX ADMIN — MIRTAZAPINE 30 MG: 30 TABLET, FILM COATED ORAL at 09:06

## 2023-06-05 RX ADMIN — ENOXAPARIN SODIUM 40 MG: 40 INJECTION SUBCUTANEOUS at 04:06

## 2023-06-05 RX ADMIN — OXYCODONE HYDROCHLORIDE 10 MG: 10 TABLET ORAL at 09:06

## 2023-06-05 RX ADMIN — GABAPENTIN 200 MG: 100 CAPSULE ORAL at 09:06

## 2023-06-05 NOTE — ANESTHESIA PREPROCEDURE EVALUATION
[facility]  Anesthesia Pre-Operative Evaluation         Patient Name: Edita Riley  YOB: 1965  MRN: [id number]    SUBJECTIVE:     Pre-operative evaluation for Procedure(s) (LRB):  Nephrostogram - antegrade (N/A)  LOOPOGRAM (N/A)  Scheduled for 6/6/2023    HPI 06/05/2023:  Edita Riley is a 58 y.o. female with hx of HTN, CKD3, emphysema, fibromyalgia, and QUOC. She has distal ureteral strictures due to XRT for cervical cancer s/p transverse colon conduit and B/L nephrostomy tubes complicated by MDR infections, admitted with UTI. Presents for above procedures.    Hx of difficult airway; multiple attempts, required video laryngoscopy due to anterior larynx and small mouth opening. Grade I view with Martinez 3 on most recent intubation.    Currently PEC'ed due to SI. Phone consent signed w/ patient's .      Prev airway:   VL, Martinez 3, Grade I view    Oxygen/Ventilation Requirements:  On room air            Patient Active Problem List   Diagnosis    Cervical cancer    Osteoarthritis of back    History of essential hypertension    Lower extremity pain, diffuse    Weakness of both lower extremities    Impaired functional mobility, balance, gait, and endurance    Colon polyp    Internal hemorrhoids    Severe episode of recurrent major depressive disorder, with psychotic features    Fibromyalgia    Carpal tunnel syndrome on right    History of cervical cancer    Metastatic squamous cell carcinoma to lymph node    Generalized anxiety disorder    PTSD (post-traumatic stress disorder)    Neuropathy due to chemotherapeutic drug    Seizure-like activity    ODESSA (acute kidney injury)    Anemia due to chronic kidney disease    Hydronephrosis    Family history of colon cancer    Abnormal mammogram    Radiation cystitis    Moderate malnutrition    Vitamin D deficiency    Ileostomy in place    History of DVT (deep vein thrombosis)    Presence  of urostomy    QT prolongation    Metabolic acidosis    Hard to intubate    Moderate episode of recurrent major depressive disorder    Nephrostomy status    Depression    Physical deconditioning    Preprocedural cardiovascular examination    CKD (chronic kidney disease), stage III    UTI (urinary tract infection)    Migraine without aura and without status migrainosus, not intractable    Arthritis    Emphysema of lung    Edema    Refusal of blood transfusions as patient is Confucianist    Major depressive disorder, recurrent episode, moderate       Review of patient's allergies indicates:   Allergen Reactions    Bee sting [allergen ext-venom-honey bee]      Rash      Grass pollen-bermuda, standard      rash       Outpatient Medications:  No current facility-administered medications on file prior to encounter.     Current Outpatient Medications on File Prior to Encounter   Medication Sig Dispense Refill    gabapentin (NEURONTIN) 100 MG capsule Take 2 capsules (200 mg total) by mouth every evening. 90 capsule 3    tamsulosin (FLOMAX) 0.4 mg Cap Take 1 capsule (0.4 mg total) by mouth once daily. For spasm associated with nephrostomy tubes 30 capsule 0        Current Inpatient Medications:      Past Surgical History:   Procedure Laterality Date    ANTEGRADE NEPHROSTOGRAPHY Bilateral 9/28/2020    Procedure: Nephrostogram - antegrade;  Surgeon: Leslie Balbuena MD;  Location: [address];  Service: Urology;  Laterality: Bilateral;    ANTEGRADE NEPHROSTOGRAPHY Left 4/20/2021    Procedure: NEPHROSTOGRAM, ANTEGRADE;  Surgeon: Leslie Balbuena MD;  Location: [address];  Service: Urology;  Laterality: Left;    ANTEGRADE NEPHROSTOGRAPHY Right 10/27/2022    Procedure: NEPHROSTOGRAM, ANTEGRADE;  Surgeon: Chirag Russ MD;  Location: [address];  Service: Urology;  Laterality: Right;    ANTEGRADE NEPHROSTOGRAPHY Right 4/21/2023    Procedure: NEPHROSTOGRAM, ANTEGRADE;  Surgeon:  Chirag Russ MD;  Location: [facility] OR 2ND FLR;  Service: Urology;  Laterality: Right;    BILATERAL OOPHORECTOMY  2015    CHOLECYSTECTOMY  11/09/2016    Done at [facility], path showed chronic cholecystitis and gallstones    cold knife conization  2014    COLECTOMY, RIGHT  9/17/2020    Procedure: COLECTOMY, RIGHT Extended;  Surgeon: Hammad Reynolds MD;  Location: [facility] OR 2ND FLR;  Service: Colon and Rectal;;    COLONOSCOPY  2014    COLONOSCOPY N/A 10/24/2016    at [facility], Dr Gutiérrez    COLONOSCOPY N/A 5/18/2018    Procedure: COLONOSCOPY;  Surgeon: Arden Gutiérrez MD;  Location: [facility] ENDO (4TH FLR);  Service: Endoscopy;  Laterality: N/A;    COLONOSCOPY N/A 7/28/2020    Procedure: COLONOSCOPY;  Surgeon: Hammad Reynolds MD;  Location: [facility] ENDO (4TH FLR);  Service: Colon and Rectal;  Laterality: N/A;  covid test [place] 7/25    CYSTOSCOPY WITH URETEROSCOPY, RETROGRADE PYELOGRAPHY, AND INSERTION OF STENT Bilateral 3/21/2020    Procedure: CYSTOSCOPY, WITH RETROGRADE PYELOGRAM,;  Surgeon: Leslie Balbuena MD;  Location: [facility] OR 1ST FLR;  Service: Urology;  Laterality: Bilateral;    DILATION OF NEPHROSTOMY TRACT Right 10/27/2022    Procedure: DILATION, NEPHROSTOMY TRACT;  Surgeon: Chirag Russ MD;  Location: [facility] OR 1ST FLR;  Service: Urology;  Laterality: Right;    ESOPHAGOGASTRODUODENOSCOPY  2014    ESOPHAGOGASTRODUODENOSCOPY  11/18/2020    ESOPHAGOGASTRODUODENOSCOPY N/A 11/18/2020    Procedure: ESOPHAGOGASTRODUODENOSCOPY (EGD);  Surgeon: Zenon Spencer MD;  Location: [facility];  Service: Endoscopy;  Laterality: N/A;    ESOPHAGOGASTRODUODENOSCOPY N/A 12/11/2020    Procedure: EGD (ESOPHAGOGASTRODUODENOSCOPY);  Surgeon: Juancho Muse MD;  Location: [facility] ENDO (2ND FLR);  Service: Endoscopy;  Laterality: N/A;    HYSTERECTOMY  2014    [facility] for cervical cancer    ILEOSTOMY  9/17/2020    Procedure: CREATION, ILEOSTOMY;  Surgeon: Hammad Reynolds MD;  Location: [facility] OR 2ND FLR;  Service: Colon and  Rectal;;    LOOPOGRAM N/A 4/20/2021    Procedure: LOOPOGRAM;  Surgeon: Leslie Balbuena MD;  Location: [facility] OR 1ST FLR;  Service: Urology;  Laterality: N/A;    MOBILIZATION OF SPLENIC FLEXURE N/A 9/11/2020    Procedure: MOBILIZATION, COLONIC;  Surgeon: Hammad Reynolds MD;  Location: [facility] OR 2ND FLR;  Service: Colon and Rectal;  Laterality: N/A;    NEPHROSTOGRAPHY Bilateral 4/17/2021    Procedure: Nephrostogram;  Surgeon: Celeste Surgeon;  Location: [facility];  Service: Anesthesiology;  Laterality: Bilateral;    OOPHORECTOMY      PERCUTANEOUS NEPHROLITHOTOMY Right 4/21/2023    Procedure: NEPHROLITHOTOMY, PERCUTANEOUS;  Surgeon: Chirag Russ MD;  Location: [facility] OR 2ND FLR;  Service: Urology;  Laterality: Right;  2.5 hrs    PERCUTANEOUS NEPHROSTOMY  4/21/2023    Procedure: CREATION, NEPHROSTOMY, PERCUTANEOUS with removal of existing nephrostomy tube;  Surgeon: Chirag Russ MD;  [facility];  Service: Urology;;    PYELOSCOPY Right 10/27/2022    Procedure: PYELOSCOPY;  Surgeon: Chirag Russ MD;  [facility];  Service: Urology;  Laterality: Right;    REPLACEMENT OF NEPHROSTOMY TUBE Right 10/27/2022    Procedure: REPLACEMENT, NEPHROSTOMY TUBE;  Surgeon: Chirag Russ MD;  [facility];  Service: Urology;  Laterality: Right;    RETROPERITONEAL LYMPHADENECTOMY Right 9/17/2018    Procedure: LYMPHADENECTOMY, RETROPERITONEUM;  Surgeon: Sebas Reed MD;  [facility];  Service: General;  Laterality: Right;  ILIAC    URETEROSCOPIC REMOVAL OF URETERIC CALCULUS Right 10/27/2022    Procedure: REMOVAL, CALCULUS, URETER, URETEROSCOPIC;  Surgeon: Chirag Russ MD;  [facility];  Service: Urology;  Laterality: Right;    URETEROSCOPY Right 10/27/2022    Procedure: URETEROSCOPY-ANTEGRADE;  Surgeon: Chirag Russ MD;  [facility];  Service: Urology;  Laterality: Right;       Social History     Socioeconomic  History    Marital status:      Spouse name: Hammad    Number of children: 2   Tobacco Use    Smoking status: Never    Smokeless tobacco: Never   Substance and Sexual Activity    Alcohol use: No     Alcohol/week: 0.0 standard drinks    Drug use: No    Sexual activity: Yes     Partners: Male     Birth control/protection: None     Comment:  19 years since    Social History Narrative    , twin daughters (1  2018), disabled due to childhood stroke, Zoroastrianism sophia     Social Determinants of Health     Financial Resource Strain: Low Risk     Difficulty of Paying Living Expenses: Not very hard   Food Insecurity: No Food Insecurity    Worried About Running Out of Food in the Last Year: Never true    Ran Out of Food in the Last Year: Never true   Transportation Needs: No Transportation Needs    Lack of Transportation (Medical): No    Lack of Transportation (Non-Medical): No   Physical Activity: Inactive    Days of Exercise per Week: 0 days    Minutes of Exercise per Session: 0 min   Stress: No Stress Concern Present    Feeling of Stress : Only a little   Social Connections: Moderately Isolated    Frequency of Communication with Friends and Family: More than three times a week    Frequency of Social Gatherings with Friends and Family: More than three times a week    Attends Buddhist Services: Never    Active Member of Clubs or Organizations: No    Attends Club or Organization Meetings: Never    Marital Status:    Housing Stability: Low Risk     Unable to Pay for Housing in the Last Year: No    Number of Places Lived in the Last Year: 1    Unstable Housing in the Last Year: No       OBJECTIVE:     Weight:  Wt Readings from Last 1 Encounters:   23 83 kg (183 lb)     There is no height or weight on file to calculate BMI.    Recent Blood Pressure Readings:  BP Readings from Last 3 Encounters:   23 118/63   23 120/61   23 (!)  122/52       Vital Signs Range (Last 24H):  Temp:  [36.3 °C (97.3 °F)-37.5 °C (99.5 °F)]   Pulse:  [77-85]   Resp:  [12-18]   BP: (109-126)/(55-71)   SpO2:  [94 %-98 %]       CBC:   Lab Results   Component Value Date    WBC 7.08 06/05/2023    HGB 11.5 (L) 06/05/2023    HCT 39.1 06/05/2023    MCV 95 06/05/2023     06/05/2023       CMP:     Chemistry        Component Value Date/Time     06/05/2023 0500    K 3.8 06/05/2023 0500     (H) 06/05/2023 0500    CO2 20 (L) 06/05/2023 0500    BUN 18 06/05/2023 0500    CREATININE 1.2 06/05/2023 0500    GLU 84 06/05/2023 0500        Component Value Date/Time    CALCIUM 9.4 06/05/2023 0500    ALKPHOS 156 (H) 05/30/2023 1855    AST 15 05/30/2023 1855    ALT 15 05/30/2023 1855    BILITOT 0.2 05/30/2023 1855    ESTGFRAFRICA 27.5 (A) 05/02/2022 0924    EGFRNONAA 23.8 (A) 05/02/2022 0924            INR:  Lab Results   Component Value Date    INR 1.0 03/01/2023    INR 1.0 02/14/2023    INR 0.9 01/12/2023       Diagnostic Studies:      EKG:    Results for orders placed or performed during the hospital encounter of 05/30/23   EKG 12-lead    Collection Time: 05/30/23  7:30 PM    Narrative    Test Reason : R11.2,    Vent. Rate : 093 BPM     Atrial Rate : 093 BPM     P-R Int : 150 ms          QRS Dur : 082 ms      QT Int : 350 ms       P-R-T Axes : 080 091 079 degrees     QTc Int : 435 ms    Sinus rhythm  Rightward axis  Borderline Abnormal ECG  When compared with ECG of 11-MAY-2023 23:10,  No change  Confirmed by Abdiel PITTMAN MD ([id number]) on 5/31/2023 9:07:16 AM    Referred By: MERRY   SELF           Confirmed By:Abdiel PITTMAN MD       2D Echo:    No results found. However, due to the size of the patient record, not all encounters were searched. Please check Results Review for a complete set of results.    Results for orders placed or performed during the hospital encounter of 10/20/22   Echo   Result Value Ref Range    TDI SEPTAL 0.12 m/s    LV LATERAL E/E' RATIO 5.00 m/s     LV SEPTAL E/E' RATIO 5.83 m/s    IVC diameter 1.26 cm    Left Ventricular Outflow Tract Mean Velocity 0.65 cm/s    Left Ventricular Outflow Tract Mean Gradient 1.97 mmHg    AORTIC VALVE CUSP SEPERATION 1.70 cm    TDI LATERAL 0.14 m/s    PV PEAK VELOCITY 1.12 cm/s    LVIDd 3.58 3.5 - 6.0 cm    IVS 1.06 0.6 - 1.1 cm    Posterior Wall 1.06 0.6 - 1.1 cm    Ao root annulus 2.34 cm    LVIDs 2.03 (A) 2.1 - 4.0 cm    FS 43 28 - 44 %    Sinus 1.74 cm    LV mass 116.51 g    RVDD 2.29 cm    Left Ventricle Relative Wall Thickness 0.59 cm    AV mean gradient 3 mmHg    AV valve area 1.30 cm2    AV Velocity Ratio 0.78     AV index (prosthetic) 0.81     MV valve area p 1/2 method 4.92 cm2    E/A ratio 1.01     Mean e' 0.13 m/s    E wave deceleration time 154.78 msec    IVRT 68.51 msec    LVOT diameter 1.43 cm    LVOT area 1.6 cm2    LVOT peak abdelrahman 0.97 m/s    LVOT peak VTI 21.70 cm    Ao peak abdelrahman 1.25 m/s    Ao VTI 26.7 cm    LVOT stroke volume 34.83 cm3    AV peak gradient 6 mmHg    E/E' ratio 5.38 m/s    MV Peak E Abdelrahman 0.70 m/s    TR Max Abdelrahman 1.77 m/s    MV stenosis pressure 1/2 time 44.69 ms    MV Peak A Abdelrahman 0.69 m/s    LV Systolic Volume 13.26 mL    LV Diastolic Volume 53.67 mL    RA Major Axis 3.68 cm    Left Atrium Minor Axis 2.34 cm    Left Atrium Major Axis 4.47 cm    Triscuspid Valve Regurgitation Peak Gradient 13 mmHg    LA volume (mod) 31.47 cm3    RA Width 2.79 cm    Right Atrial Pressure (from IVC) 3 mmHg    EF 70 %    TV rest pulmonary artery pressure 16 mmHg    Narrative    · The left ventricle is normal in size with mild concentric hypertrophy   and normal systolic function.  · The estimated ejection fraction is 70%.  · Normal left ventricular diastolic function.  · Normal right ventricular size with normal right ventricular systolic   function.  · Mild mitral regurgitation.  · Normal central venous pressure (3 mmHg).  · The estimated PA systolic pressure is 16 mmHg.          No results found. However, due to the size  of the patient record, not all encounters were searched. Please check Results Review for a complete set of results.      ASSESSMENT/PLAN:           Pre-op Assessment    I have reviewed the Patient Summary Reports.    I have reviewed the NPO Status.      Review of Systems  Social:  Non-Smoker    Hematology/Oncology:         -- Cancer in past history:   Cardiovascular:   Hypertension Denies CABG/stent.   Denies CHF.    Pulmonary:   Denies Asthma.    Renal/:   Chronic Renal Disease    Hepatic/GI:   Hiatal Hernia, GERD    Neurological:   CVA Seizures    Endocrine:   Denies Diabetes.  Denies Obesity / BMI > 30  Psych:   Psychiatric History          Physical Exam  General: Well nourished, Cooperative, Alert and Oriented    Airway:  Mallampati: II   Mouth Opening: Normal  TM Distance: Normal  Tongue: Normal  Neck ROM: Normal ROM        Anesthesia Plan  Type of Anesthesia, risks & benefits discussed:    Anesthesia Type: Gen Natural Airway, MAC  Intra-op Monitoring Plan: Standard ASA Monitors  Post Op Pain Control Plan: multimodal analgesia and IV/PO Opioids PRN  Induction:  IV  Informed Consent: Informed consent signed with the Patient representative and all parties understand the risks and agree with anesthesia plan.  All questions answered.   ASA Score: 3  Day of Surgery Review of History & Physical: H&P Update referred to the surgeon/provider.    Ready For Surgery From Anesthesia Perspective.     .

## 2023-06-05 NOTE — PROGRESS NOTES
"CONSULTATION LIAISON PSYCHIATRY PROGRESS NOTE    Patient Name: Edita Riley  MRN: [id number]  Patient Class: OP- Observation  Admission Date: 5/30/2023  Attending Physician: Dioni Brush MD      SUBJECTIVE:   Edita Riley is a 58 y.o. female with past psychiatric history of depression & past pertinent medical history of cervical cancer s/p transverse colon conduit, bilateral nephrostomy tubes c/b recurrent pyelonephritis who presents to the ED/admitted to the hospital for UTI (urinary tract infection). She was transferred from [facility] where she is admitted under [facility] for depression and suicidal ideation.      Psychiatry consulted for "h/o depression admitted for recurrent UTI. Recent voluntary admission to [facility] for SI after her daughter committed suicide on 5/11/2023. PEC orderd. Consult for management of depression and SI."    Today, patient resting in bed. States depression has improved over the last couple days. Concern now is back pain. States planning for surgery tomorrow. Conflicting from primary team's notes yesterday stating she is medically cleared for transfer back to [facility]. States her mood is "alright." Denies suicidal ideation/plan/intent. Has been compliant with remeron and denies any side effects. States she slept well overnight. Tolerating PO intake without problems. Voices concerns with returning to her home, as this was the place where her daughter was killed. Working with her  on alternative living options. [facility]. Denies HI/AVH.        OBJECTIVE:    Mental Status Exam:  General Appearance:  appears stated age, fair grooming, casually dressed in hospital gown  Behavior: cooperative, friendly, appropriate eye-contact  Involuntary Movements and Motor Activity: no abnormal involuntary movements noted; no tics, no tremors, no akathisia, no dystonia, no evidence of tardive dyskinesia; no " "psychomotor agitation or retardation  Gait and Station: unable to assess - patient lying down or seated  Speech and Language: intact; normal rate, rhythm, volume and dysphoric tone; conversational, spontaneous, and coherent; speaks and understands English proficiently and fluently; repeats words and phrases, no word finding difficulties are noted  Mood: "alright" Notes improvement in depression  Affect:  mood-congruent, dysphoric  Thought Process and Associations: linear, goal-directed, organized  Thought Content and Perceptions::  Denies SI/HI/AVH  Sensorium and Orientation: intact; alert with clear sensorium; oriented fully to person, place, time and situation  Recent and Remote Memory: grossly intact, able to recall relevant and salient information from the recent and remote past  Attention and Concentration: intact, attentive to conversation, able to spell WORLD forwards and backwards  Fund of Knowledge: grossly intact, used appropriate vocabulary and demonstrated an awareness of current events, consistent with educational level achieved  Insight: demonstrates awareness of illness + situation  Judgment: compliant with health provider's recommendations and instructions    CAM ICU positive? no      ASSESSMENT & RECOMMENDATIONS   MDD, recurrent, severe  Complicated bereavement     PSYCH MEDICATIONS  Scheduled  - Continue Remeron 30 mg qHS     RISK ASSESSMENT  NEEDS PEC for SI & NEEDS 1:1 sitter  PEC/CEC  2023 @ 6:20pm     FOLLOW UP  Will follow up while in house     DISPOSITION - once medically cleared:  Seek involuntary inpatient psychiatric admission for stabilization of acute psychiatric symptoms and a safe disposition plan is enacted. The patient was informed that the patient will be transferred to an inpatient unit per ED placement team. Return to [facility] following medical stability.      Please contact ON CALL psychiatry service () for any acute issues that may arise.    Dr. Jiménez " Eugenio  CL Psychiatry  [facility]  6/5/2023 2:47 PM        --------------------------------------------------------------------------------------------------------------------------------------------------------------------------------------------------------------------------------------    CONTINUED OBJECTIVE clinical data & findings reviewed and noted for above decision making    Current Medications:   Scheduled Meds:    enoxparin  40 mg Subcutaneous Daily    ertapenem (INVANZ) IVPB  1 g Intravenous Q24H    gabapentin  200 mg Oral QHS    mirtazapine  30 mg Oral Nightly    tamsulosin  0.4 mg Oral Daily     PRN Meds: acetaminophen, butalbital-acetaminophen-caffeine -40 mg, morphine, oxyCODONE, sodium chloride 0.9%    Allergies:   Review of patient's allergies indicates:   Allergen Reactions    Bee sting [allergen ext-venom-honey bee]      Rash      Grass pollen-bermuda, standard      rash       Vitals  Vitals:    06/05/23 1217   BP:    Pulse:    Resp: 18   Temp:        Labs/Imaging/Studies:  Recent Results (from the past 24 hour(s))   Basic Metabolic Panel    Collection Time: 06/05/23  5:00 AM   Result Value Ref Range    Sodium 140 136 - 145 mmol/L    Potassium 3.8 3.5 - 5.1 mmol/L    Chloride 113 (H) 95 - 110 mmol/L    CO2 20 (L) 23 - 29 mmol/L    Glucose 84 70 - 110 mg/dL    BUN 18 6 - 20 mg/dL    Creatinine 1.2 0.5 - 1.4 mg/dL    Calcium 9.4 8.7 - 10.5 mg/dL    Anion Gap 7 (L) 8 - 16 mmol/L    eGFR 51.8 (A) >60 mL/min/1.73 m^2   CBC auto differential    Collection Time: 06/05/23  5:00 AM   Result Value Ref Range    WBC 7.08 3.90 - 12.70 K/uL    RBC 4.11 4.00 - 5.40 M/uL    Hemoglobin 11.5 (L) 12.0 - 16.0 g/dL    Hematocrit 39.1 37.0 - 48.5 %    MCV 95 82 - 98 fL    MCH 28.0 27.0 - 31.0 pg    MCHC 29.4 (L) 32.0 - 36.0 g/dL    RDW 15.8 (H) 11.5 - 14.5 %    Platelets 312 150 - 450 K/uL    MPV 10.3 9.2 - 12.9 fL    Immature Granulocytes 0.1 0.0 - 0.5 %    Gran # (ANC) 3.8 1.8 - 7.7 K/uL     Immature Grans (Abs) 0.01 0.00 - 0.04 K/uL    Lymph # 2.0 1.0 - 4.8 K/uL    Mono # 1.0 0.3 - 1.0 K/uL    Eos # 0.2 0.0 - 0.5 K/uL    Baso # 0.05 0.00 - 0.20 K/uL    nRBC 0 0 /100 WBC    Gran % 53.5 38.0 - 73.0 %    Lymph % 28.8 18.0 - 48.0 %    Mono % 13.8 4.0 - 15.0 %    Eosinophil % 3.1 0.0 - 8.0 %    Basophil % 0.7 0.0 - 1.9 %    Differential Method Automated      Imaging Results    None

## 2023-06-06 ENCOUNTER — ANESTHESIA (OUTPATIENT)
Dept: SURGERY | Facility: HOSPITAL | Age: 58
DRG: 699 | End: 2023-06-06
Payer: MEDICAID

## 2023-06-06 LAB
ANION GAP SERPL CALC-SCNC: 6 MMOL/L (ref 8–16)
BASOPHILS # BLD AUTO: 0.04 K/UL (ref 0–0.2)
BASOPHILS NFR BLD: 0.5 % (ref 0–1.9)
BUN SERPL-MCNC: 17 MG/DL (ref 6–20)
CALCIUM SERPL-MCNC: 9.5 MG/DL (ref 8.7–10.5)
CHLORIDE SERPL-SCNC: 112 MMOL/L (ref 95–110)
CO2 SERPL-SCNC: 22 MMOL/L (ref 23–29)
CREAT SERPL-MCNC: 1.3 MG/DL (ref 0.5–1.4)
DIFFERENTIAL METHOD: ABNORMAL
EOSINOPHIL # BLD AUTO: 0.2 K/UL (ref 0–0.5)
EOSINOPHIL NFR BLD: 2.1 % (ref 0–8)
ERYTHROCYTE [DISTWIDTH] IN BLOOD BY AUTOMATED COUNT: 15.6 % (ref 11.5–14.5)
EST. GFR  (NO RACE VARIABLE): 47.1 ML/MIN/1.73 M^2
GLUCOSE SERPL-MCNC: 74 MG/DL (ref 70–110)
HCT VFR BLD AUTO: 36.2 % (ref 37–48.5)
HGB BLD-MCNC: 10.7 G/DL (ref 12–16)
IMM GRANULOCYTES # BLD AUTO: 0.03 K/UL (ref 0–0.04)
IMM GRANULOCYTES NFR BLD AUTO: 0.4 % (ref 0–0.5)
INR PPP: 0.9 (ref 0.8–1.2)
LYMPHOCYTES # BLD AUTO: 2 K/UL (ref 1–4.8)
LYMPHOCYTES NFR BLD: 23.9 % (ref 18–48)
MCH RBC QN AUTO: 27 PG (ref 27–31)
MCHC RBC AUTO-ENTMCNC: 29.6 G/DL (ref 32–36)
MCV RBC AUTO: 91 FL (ref 82–98)
MONOCYTES # BLD AUTO: 1.2 K/UL (ref 0.3–1)
MONOCYTES NFR BLD: 13.9 % (ref 4–15)
NEUTROPHILS # BLD AUTO: 4.9 K/UL (ref 1.8–7.7)
NEUTROPHILS NFR BLD: 59.2 % (ref 38–73)
NRBC BLD-RTO: 0 /100 WBC
PLATELET # BLD AUTO: 245 K/UL (ref 150–450)
PMV BLD AUTO: 10.3 FL (ref 9.2–12.9)
POTASSIUM SERPL-SCNC: 3.7 MMOL/L (ref 3.5–5.1)
PROTHROMBIN TIME: 10.2 SEC (ref 9–12.5)
RBC # BLD AUTO: 3.97 M/UL (ref 4–5.4)
SODIUM SERPL-SCNC: 140 MMOL/L (ref 136–145)
WBC # BLD AUTO: 8.28 K/UL (ref 3.9–12.7)

## 2023-06-06 PROCEDURE — D9220A PRA ANESTHESIA: Mod: CRNA,,, | Performed by: NURSE ANESTHETIST, CERTIFIED REGISTERED

## 2023-06-06 PROCEDURE — 63600175 PHARM REV CODE 636 W HCPCS: Performed by: INTERNAL MEDICINE

## 2023-06-06 PROCEDURE — 37000008 HC ANESTHESIA 1ST 15 MINUTES: Performed by: UROLOGY

## 2023-06-06 PROCEDURE — 85025 COMPLETE CBC W/AUTO DIFF WBC: CPT | Performed by: STUDENT IN AN ORGANIZED HEALTH CARE EDUCATION/TRAINING PROGRAM

## 2023-06-06 PROCEDURE — 36000707: Performed by: UROLOGY

## 2023-06-06 PROCEDURE — 25000003 PHARM REV CODE 250: Performed by: STUDENT IN AN ORGANIZED HEALTH CARE EDUCATION/TRAINING PROGRAM

## 2023-06-06 PROCEDURE — 50431 NJX PX NFROSGRM &/URTRGRM: CPT | Mod: 51,58,LT, | Performed by: UROLOGY

## 2023-06-06 PROCEDURE — 80048 BASIC METABOLIC PNL TOTAL CA: CPT | Performed by: STUDENT IN AN ORGANIZED HEALTH CARE EDUCATION/TRAINING PROGRAM

## 2023-06-06 PROCEDURE — 36415 COLL VENOUS BLD VENIPUNCTURE: CPT

## 2023-06-06 PROCEDURE — 37000009 HC ANESTHESIA EA ADD 15 MINS: Performed by: UROLOGY

## 2023-06-06 PROCEDURE — 50690 INJECTION FOR URETER X-RAY: CPT | Mod: 79,,, | Performed by: UROLOGY

## 2023-06-06 PROCEDURE — 50431 PR INJECTION PX NEPHROSTOGRAM &/ URETEROGRAM, EXISTING ACCESS, INCL GUID, S&I: ICD-10-PCS | Mod: 51,58,LT, | Performed by: UROLOGY

## 2023-06-06 PROCEDURE — D9220A PRA ANESTHESIA: ICD-10-PCS | Mod: ANES,,, | Performed by: ANESTHESIOLOGY

## 2023-06-06 PROCEDURE — 94761 N-INVAS EAR/PLS OXIMETRY MLT: CPT

## 2023-06-06 PROCEDURE — 99223 1ST HOSP IP/OBS HIGH 75: CPT | Mod: ,,,

## 2023-06-06 PROCEDURE — 63600175 PHARM REV CODE 636 W HCPCS: Performed by: STUDENT IN AN ORGANIZED HEALTH CARE EDUCATION/TRAINING PROGRAM

## 2023-06-06 PROCEDURE — 71000015 HC POSTOP RECOV 1ST HR: Performed by: UROLOGY

## 2023-06-06 PROCEDURE — 74425 UROGRAPHY ANTEGRADE RS&I: CPT | Mod: 26,59,, | Performed by: UROLOGY

## 2023-06-06 PROCEDURE — 71000044 HC DOSC ROUTINE RECOVERY FIRST HOUR: Performed by: UROLOGY

## 2023-06-06 PROCEDURE — 11000001 HC ACUTE MED/SURG PRIVATE ROOM

## 2023-06-06 PROCEDURE — 25000003 PHARM REV CODE 250: Performed by: PHYSICIAN ASSISTANT

## 2023-06-06 PROCEDURE — 36415 COLL VENOUS BLD VENIPUNCTURE: CPT | Performed by: STUDENT IN AN ORGANIZED HEALTH CARE EDUCATION/TRAINING PROGRAM

## 2023-06-06 PROCEDURE — 25500020 PHARM REV CODE 255: Performed by: UROLOGY

## 2023-06-06 PROCEDURE — 74425 PR UROGRAPHY, ANTEGRADE, W/SUPV & INT: ICD-10-PCS | Mod: 26,59,, | Performed by: UROLOGY

## 2023-06-06 PROCEDURE — 50690 PR INJECT RETROGRADE/CONDUIT X-RAY: ICD-10-PCS | Mod: 79,,, | Performed by: UROLOGY

## 2023-06-06 PROCEDURE — 85610 PROTHROMBIN TIME: CPT

## 2023-06-06 PROCEDURE — D9220A PRA ANESTHESIA: ICD-10-PCS | Mod: CRNA,,, | Performed by: NURSE ANESTHETIST, CERTIFIED REGISTERED

## 2023-06-06 PROCEDURE — 99232 PR SUBSEQUENT HOSPITAL CARE,LEVL II: ICD-10-PCS | Mod: ,,, | Performed by: INTERNAL MEDICINE

## 2023-06-06 PROCEDURE — 36000706: Performed by: UROLOGY

## 2023-06-06 PROCEDURE — 25000003 PHARM REV CODE 250: Performed by: INTERNAL MEDICINE

## 2023-06-06 PROCEDURE — D9220A PRA ANESTHESIA: Mod: ANES,,, | Performed by: ANESTHESIOLOGY

## 2023-06-06 PROCEDURE — 99223 PR INITIAL HOSPITAL CARE,LEVL III: ICD-10-PCS | Mod: ,,,

## 2023-06-06 PROCEDURE — 25000003 PHARM REV CODE 250: Performed by: NURSE ANESTHETIST, CERTIFIED REGISTERED

## 2023-06-06 PROCEDURE — 63600175 PHARM REV CODE 636 W HCPCS: Performed by: NURSE ANESTHETIST, CERTIFIED REGISTERED

## 2023-06-06 PROCEDURE — 99232 SBSQ HOSP IP/OBS MODERATE 35: CPT | Mod: ,,, | Performed by: INTERNAL MEDICINE

## 2023-06-06 PROCEDURE — 63600175 PHARM REV CODE 636 W HCPCS: Performed by: PHYSICIAN ASSISTANT

## 2023-06-06 RX ORDER — PHENYLEPHRINE HCL IN 0.9% NACL 1 MG/10 ML
SYRINGE (ML) INTRAVENOUS
Status: DISCONTINUED | OUTPATIENT
Start: 2023-06-06 | End: 2023-06-06

## 2023-06-06 RX ORDER — HYDROMORPHONE HYDROCHLORIDE 1 MG/ML
0.2 INJECTION, SOLUTION INTRAMUSCULAR; INTRAVENOUS; SUBCUTANEOUS EVERY 5 MIN PRN
Status: DISCONTINUED | OUTPATIENT
Start: 2023-06-06 | End: 2023-06-06 | Stop reason: HOSPADM

## 2023-06-06 RX ORDER — LOPERAMIDE HYDROCHLORIDE 2 MG/1
2 CAPSULE ORAL 4 TIMES DAILY PRN
Status: DISCONTINUED | OUTPATIENT
Start: 2023-06-06 | End: 2023-06-13 | Stop reason: HOSPADM

## 2023-06-06 RX ORDER — SODIUM CHLORIDE 0.9 % (FLUSH) 0.9 %
10 SYRINGE (ML) INJECTION
Status: DISCONTINUED | OUTPATIENT
Start: 2023-06-06 | End: 2023-06-06 | Stop reason: HOSPADM

## 2023-06-06 RX ORDER — LOPERAMIDE HYDROCHLORIDE 2 MG/1
4 CAPSULE ORAL ONCE
Status: COMPLETED | OUTPATIENT
Start: 2023-06-06 | End: 2023-06-06

## 2023-06-06 RX ORDER — PROPOFOL 10 MG/ML
VIAL (ML) INTRAVENOUS CONTINUOUS PRN
Status: DISCONTINUED | OUTPATIENT
Start: 2023-06-06 | End: 2023-06-06

## 2023-06-06 RX ORDER — LIDOCAINE HYDROCHLORIDE 20 MG/ML
INJECTION, SOLUTION EPIDURAL; INFILTRATION; INTRACAUDAL; PERINEURAL
Status: DISCONTINUED | OUTPATIENT
Start: 2023-06-06 | End: 2023-06-06

## 2023-06-06 RX ORDER — PROCHLORPERAZINE EDISYLATE 5 MG/ML
5 INJECTION INTRAMUSCULAR; INTRAVENOUS EVERY 30 MIN PRN
Status: DISCONTINUED | OUTPATIENT
Start: 2023-06-06 | End: 2023-06-06 | Stop reason: HOSPADM

## 2023-06-06 RX ORDER — PROPOFOL 10 MG/ML
VIAL (ML) INTRAVENOUS
Status: DISCONTINUED | OUTPATIENT
Start: 2023-06-06 | End: 2023-06-06

## 2023-06-06 RX ADMIN — TAMSULOSIN HYDROCHLORIDE 0.4 MG: 0.4 CAPSULE ORAL at 09:06

## 2023-06-06 RX ADMIN — Medication 200 MCG: at 07:06

## 2023-06-06 RX ADMIN — PROPOFOL 30 MG: 10 INJECTION, EMULSION INTRAVENOUS at 07:06

## 2023-06-06 RX ADMIN — OXYCODONE HYDROCHLORIDE 10 MG: 10 TABLET ORAL at 08:06

## 2023-06-06 RX ADMIN — MORPHINE SULFATE 3 MG: 4 INJECTION INTRAVENOUS at 04:06

## 2023-06-06 RX ADMIN — ENOXAPARIN SODIUM 40 MG: 40 INJECTION SUBCUTANEOUS at 04:06

## 2023-06-06 RX ADMIN — LIDOCAINE HYDROCHLORIDE 50 MG: 20 INJECTION, SOLUTION EPIDURAL; INFILTRATION; INTRACAUDAL; PERINEURAL at 07:06

## 2023-06-06 RX ADMIN — GABAPENTIN 200 MG: 100 CAPSULE ORAL at 08:06

## 2023-06-06 RX ADMIN — MIRTAZAPINE 30 MG: 30 TABLET, FILM COATED ORAL at 08:06

## 2023-06-06 RX ADMIN — ERTAPENEM 1 G: 1 INJECTION INTRAMUSCULAR; INTRAVENOUS at 10:06

## 2023-06-06 RX ADMIN — SODIUM CHLORIDE: 9 INJECTION, SOLUTION INTRAVENOUS at 07:06

## 2023-06-06 RX ADMIN — LOPERAMIDE HYDROCHLORIDE 4 MG: 2 CAPSULE ORAL at 11:06

## 2023-06-06 RX ADMIN — Medication 100 MCG/KG/MIN: at 07:06

## 2023-06-06 NOTE — ASSESSMENT & PLAN NOTE
- S/p Transverse colon conduit 2020 complicate by bowel perforation requiring hemicolectomy & ileostomy  - increased watery stool from ileostomy on 6/5. followup stool studies. On isolation until c diff results are back  - Follow up outpatient CRS

## 2023-06-06 NOTE — PLAN OF CARE
06/06/23 1459   Post-Acute Status   Post-Acute Authorization Placement   Post-Acute Placement Status Referrals Sent   Discharge Delays None known at this time   Discharge Plan   Discharge Plan A Psychiatric hospital   Discharge Plan B Home

## 2023-06-06 NOTE — PLAN OF CARE
Problem: Violence Risk or Actual  Goal: Anger and Impulse Control  Outcome: Ongoing, Progressing     Problem: Adult Inpatient Plan of Care  Goal: Plan of Care Review  Outcome: Ongoing, Progressing     Problem: Adult Inpatient Plan of Care  Goal: Absence of Hospital-Acquired Illness or Injury  Outcome: Ongoing, Progressing     Problem: Fluid and Electrolyte Imbalance (Acute Kidney Injury/Impairment)  Goal: Fluid and Electrolyte Balance  Outcome: Ongoing, Progressing     Problem: Fall Injury Risk  Goal: Absence of Fall and Fall-Related Injury  Outcome: Ongoing, Progressing     Problem: Skin Injury Risk Increased  Goal: Skin Health and Integrity  Outcome: Ongoing, Progressing   Pt is A,A,O x 4 . Stable V/S. Pt C/O pain at the nephrostomy tube site and received the PRN pain medication as MD ordered. Nephrostomy , urostomy and ileostomy care done and out put charted in Epic. No falls or injuries per day. NPO after Mn for nephrogram tomorrow. Pt still PEC and sitter remained at the bedside all the time.

## 2023-06-06 NOTE — PLAN OF CARE
[facility]  Discharge Reassessment    Primary Care Provider: Primary Doctor No    Expected Discharge Date: 6/7/2023    Reassessment (most recent)       Discharge Reassessment - 06/06/23 1500          Discharge Reassessment    Assessment Type Discharge Planning Reassessment     Did the patient's condition or plan change since previous assessment? No     Discharge Plan discussed with: Patient     Communicated OK with patient/caregiver Yes     Discharge Plan A Psychiatric hospital     Discharge Plan B Home     DME Needed Upon Discharge  none     Transition of Care Barriers Mental illness     Why the patient remains in the hospital Requires continued medical care;Placement issues        Post-Acute Status    Post-Acute Authorization Placement     Post-Acute Placement Status Referrals Sent     Discharge Delays None known at this time

## 2023-06-06 NOTE — PLAN OF CARE
Problem: Violence Risk or Actual  Goal: Anger and Impulse Control  Outcome: Ongoing, Progressing     Problem: Adult Inpatient Plan of Care  Goal: Plan of Care Review  Outcome: Ongoing, Progressing  Goal: Patient-Specific Goal (Individualized)  Outcome: Ongoing, Progressing  Goal: Absence of Hospital-Acquired Illness or Injury  Outcome: Ongoing, Progressing  Goal: Optimal Comfort and Wellbeing  Outcome: Ongoing, Progressing  Goal: Readiness for Transition of Care  Outcome: Ongoing, Progressing     Problem: Fluid and Electrolyte Imbalance (Acute Kidney Injury/Impairment)  Goal: Fluid and Electrolyte Balance  Outcome: Ongoing, Progressing     Problem: Oral Intake Inadequate (Acute Kidney Injury/Impairment)  Goal: Optimal Nutrition Intake  Outcome: Ongoing, Progressing     Problem: Renal Function Impairment (Acute Kidney Injury/Impairment)  Goal: Effective Renal Function  Outcome: Ongoing, Progressing     Problem: Fall Injury Risk  Goal: Absence of Fall and Fall-Related Injury  Outcome: Ongoing, Progressing     Problem: Infection  Goal: Absence of Infection Signs and Symptoms  Outcome: Ongoing, Progressing     Problem: Skin Injury Risk Increased  Goal: Skin Health and Integrity  Outcome: Ongoing, Progressing

## 2023-06-06 NOTE — PROGRESS NOTES
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 Patient was discharged last month after presenting with recurrent UTI (E. Faecium VRE). History limited form patient. She had undergone right PCN drain removal and stone extraction with drain replacement. Completed a course of Ertapenem 1 g IV daily for 10 days and Linezolid and was discharged on Bactrim after ID evaluation. Patient denies any fever, chills, nausea, vomiting, hematemesis, cough, chest pain, palpitations, shortness of breath, abdominal pain/distension, changes in bowel or urinary habits, no hematochezia or melena.      Overview/Hospital Course:  Per urology, given good renal function, no indication for inpatient placement of right nephrostomy tube for the moderate right hyrdrouteronephrosis. Per ID, switched to ertapenem based on urine culture which grew Enterobacter and will complete 10 day total course. Midline consult placed. Psychiatry following and remeron increased to 30 mg nightly. Psychology following and SW obtaining records from [facility] in [place] for med list.    6/4-  ertapenem, EOC 6/9/23.  VSS.  Midline placed. She is Med Ready for discharge. PEC and CEC in place. CM working with [facility].    6/5- Tolerating IV ertapenem well but does note more liquid stool from colostomy. Stool studies pending. She is on Remeron 30 mg nightly and remains PEC'd given her grade disability in managing her own care given her  depression and severe bereavement. Given her h/o MDR UTI requiring IV ertapenem at this time, ureteral strictures s/p nephrostomy tube placement and need for repeat nephrostogram (scheduled for 6/6), need to wound care evaluation for colostomy care and refill of outpatient supplies and her grave disability due to MDD and severe bereavement, she requires continued inpatient care to ensure she has a safe discharge to the appropriate psychiatric facility that will have the resources to care for her. She currently lacks the ability to care for herself at home given the complexity of her medical care and her current apathy and grade disability. Arranging for psychiatry facility placement after nephrostogram and stool study results.         Interval History: Patient lying in bed, no acute distress. No acute events overnight. Patient reports continued fatigue and bilateral flank pain that is controlled when she receives pain regimen. Tolerating IV ertapenem well but does note more liquid stool from colostomy. She is on Remeron 30 mg nightly and remains PEC'd given her grade disability in managing her own care. Arranging for psychiatry facility placement after nephrostogram and stool study results.       Review of Systems   Constitutional:  Positive for activity change and fatigue. Negative for chills and fever.   HENT:  Negative for congestion and trouble swallowing.    Eyes:  Negative for visual disturbance.   Respiratory:  Negative for cough and shortness of breath.    Cardiovascular:  Negative for chest pain and leg swelling.   Gastrointestinal:  Negative for abdominal distention, abdominal pain, nausea and vomiting.   Endocrine: Negative for cold intolerance, heat intolerance, polydipsia and polyuria.   Genitourinary:  Positive for flank pain. Negative for difficulty urinating and dysuria.   Musculoskeletal:  Negative for back pain and myalgias.   Skin:  Negative for rash and wound.   Neurological:  Positive for  weakness. Negative for dizziness and light-headedness.   Hematological:  Negative for adenopathy. Does not bruise/bleed easily.   Psychiatric/Behavioral:  Negative for confusion and sleep disturbance.    Objective:     Vital Signs (Most Recent):  Temp: 98.4 °F (36.9 °C) (06/05/23 1523)  Pulse: 75 (06/05/23 1523)  Resp: 18 (06/05/23 1523)  BP: 114/69 (06/05/23 1523)  SpO2: 95 % (06/05/23 1523) Vital Signs (24h Range):  Temp:  [97.3 °F (36.3 °C)-99.3 °F (37.4 °C)] 98.4 °F (36.9 °C)  Pulse:  [75-85] 75  Resp:  [12-18] 18  SpO2:  [95 %-98 %] 95 %  BP: (114-126)/(55-71) 114/69     Weight: 83 kg (183 lb)  Body mass index is 27.02 kg/m².    Intake/Output Summary (Last 24 hours) at 6/5/2023 2110  Last data filed at 6/5/2023 1700  Gross per 24 hour   Intake 1090 ml   Output 1775 ml   Net -685 ml           Physical Exam  Constitutional:       Appearance: She is well-developed.   HENT:      Head: Normocephalic and atraumatic.   Eyes:      General: No scleral icterus.     Pupils: Pupils are equal, round, and reactive to light.   Neck:      Vascular: No JVD.   Cardiovascular:      Rate and Rhythm: Normal rate and regular rhythm.      Heart sounds: No murmur heard.    No friction rub. No gallop.   Pulmonary:      Effort: Pulmonary effort is normal. No respiratory distress.      Breath sounds: Normal breath sounds. No wheezing or rales.   Abdominal:      General: Bowel sounds are normal. There is no distension.      Palpations: Abdomen is soft.      Tenderness: There is no abdominal tenderness. There is no guarding or rebound.      Comments: Ileostomy tube with normal output   Genitourinary:     Comments: Left nephrostomy tube with normal output   Musculoskeletal:         General: No deformity. Normal range of motion.      Cervical back: Neck supple.   Lymphadenopathy:      Cervical: No cervical adenopathy.   Skin:     General: Skin is warm and dry.      Capillary Refill: Capillary refill takes less than 2 seconds.      Findings:  No erythema or rash.   Neurological:      Mental Status: She is alert and oriented to person, place, and time.      Cranial Nerves: No cranial nerve deficit.      Sensory: No sensory deficit.   Psychiatric:         Mood and Affect: Mood normal.           Significant Labs: A1C: No results for input(s): HGBA1C in the last 4320 hours.  Blood Culture:   No results for input(s): LABBLOO in the last 48 hours.    CBC:   Recent Labs   Lab 06/04/23  0555 06/05/23  0500   WBC 6.96 7.08   HGB 11.3* 11.5*   HCT 36.9* 39.1    312       CMP:   Recent Labs   Lab 06/04/23  0555 06/05/23  0500    140   K 3.8 3.8   * 113*   CO2 21* 20*   GLU 84 84   BUN 15 18   CREATININE 1.3 1.2   CALCIUM 9.8 9.4   ANIONGAP 10 7*             Microbiology Results (last 7 days)       Procedure Component Value Units Date/Time    Stool culture [[id number]] Collected: 06/05/23 1335    Order Status: Sent Specimen: Stool Updated: 06/05/23 1607    E. coli 0157 antigen [[id number]] Collected: 06/05/23 1335    Order Status: No result Specimen: Stool Updated: 06/05/23 1607    Clostridium difficile EIA [[id number]] Collected: 06/05/23 1335    Order Status: Sent Specimen: Stool Updated: 06/05/23 1607    Blood culture x two cultures. Draw prior to antibiotics. [[id number]] Collected: 05/30/23 1845    Order Status: Completed Specimen: Blood from Peripheral, Wrist, Right Updated: 06/04/23 2022     Blood Culture, Routine No growth after 5 days.    Narrative:      Aerobic and anaerobic    Blood culture x two cultures. Draw prior to antibiotics. [[id number]] Collected: 05/30/23 1900    Order Status: Completed Specimen: Blood from Peripheral, Wrist, Left Updated: 06/04/23 2022     Blood Culture, Routine No growth after 5 days.    Narrative:      Aerobic and anaerobic    Urine culture [[id number]]  (Abnormal)  (Susceptibility) Collected: 05/30/23 2107    Order Status: Completed Specimen: Urine Updated: 06/01/23 2251     Urine Culture, Routine ENTEROBACTER  CLOACAE  > 100,000 cfu/ml      Narrative:      Specimen Source->Urine              Significant Imaging: I have reviewed all pertinent imaging results/findings within the past 24 hours.      Assessment/Plan:      * UTI (urinary tract infection)  Bilateral flank pain  - Recurrent Urinary tact infection related to nephrostomy tube and chronic uretral strictures  - Follow up urine cultures and blood cultures  - Urine culture 4/10 growing klebsiella and enterococcus.  - followup urine cultures   - prn percocet and prn IV morphine   - ID consulted. apprec recs  -- urine cx grew Enterobacter   -- changed meropenem to ertapenem to complete 10 day course    6/4- stable. Stop day for ertepenum is 6/9. May be discharged back to [facility]. Has midline.     Major depressive disorder, recurrent episode, moderate  Patient has persistent depression which is severe and is currently uncontrolled. Will Increase anti-depressant medications. We will consult psychiatry at this time. Patient does not display psychosis at this time. Continue to monitor closely and adjust plan of care as needed.  - patient's daughter committed suicide recently   - psychiatry and psychology following, apprec recs  - remeron 30 mg nightly   - SW obtaining medical records from [facility] in [place]    6/3 - per CM, the plan is to return to [facility]      Discharge planning issues  Given her h/o MDR UTI requiring IV ertapenem at this time, ureteral strictures s/p nephrostomy tube placement and need for repeat nephrostogram (scheduled for 6/6), need to wound care evaluation for colostomy care and refill of outpatient supplies and her grave disability due to MDD and severe bereavement (requiring PEC at this time), she continues to warrant  inpatient care to ensure she has a safe discharge to the appropriate psychiatric facility that will have the resources to care for her. She currently lacks the ability to care for herself at home  given the complexity of her medical care and her current apathy and grade disability. Arranging for psychiatry facility placement after nephrostogram and stool study results.       Nephrostomy status  - S/p Bilateral Nephrostomy tubes; R. Removed after recent complication and remains with L.Nephrostomy  - Continue with Flomax 0.4 mg, daily  - urology following.   -- plan for nephrostogram on 6/6/23    Ileostomy in place  - S/p Transverse colon conduit 2020 complicate by bowel perforation requiring hemicolectomy & ileostomy  - increased watery stool from ileostomy on 6/5. followup stool studies. On isolation until c diff results are back  - Follow up outpatient CRS    ODESSA (acute kidney injury)  ODESSA on CKD  - Scr on admit, 1.7 --> 1.4  - Monitor renal function  - Avoid Nephrotoxic agents  - Monitor urine output   - Follow up Renal U/S  - IMPROVING     6/4- cr 1.3. pt eating and drinking      VTE Risk Mitigation (From admission, onward)         Ordered     enoxaparin injection 40 mg  Daily         05/30/23 2228     IP VTE HIGH RISK PATIENT  Once         05/30/23 2228     Place sequential compression device  Until discontinued         05/30/23 2228                Discharge Planning   OK: 6/7/2023     Code Status: Full Code   Is the patient medically ready for discharge?: No    Reason for patient still in hospital (select all that apply): Patient unstable, Laboratory test, Treatment, Imaging and Consult recommendations  Discharge Plan A: Psychiatric hospital   Discharge Delays: None known at this time        Time spent in care of patient: > 35 minutes         Dioni Brush MD  Department of Hospital Medicine   [facility]

## 2023-06-06 NOTE — CONSULTS
"Consult Note  Interventional Radiology    Consult Requested By: Gokul Conway MD   Reason for Consult: "Left nephrostomy tube has not been exchanged since 3/2023, request exchange "    SUBJECTIVE:     Chief Complaint:  routine left neph tube exchange    History of Present Illness:  Edita Riley is a 58 y.o. female with a PMHx of distal ureteral strictures due to XRT for cervical cancer, L neph tube in place (last exchanged by IR 3/2/23) who was admitted on 5/30/23 for a UTI. Patient underwent a nephrostogram with urology today. Interventional Radiology has been consulted for routine L neph tube exchange while the patient is admitted.      Review of Systems   Constitutional: Negative.    Respiratory: Negative.     Cardiovascular: Negative.    Gastrointestinal: Negative.    Musculoskeletal: Negative.    Skin: Negative.    Neurological: Negative.      Scheduled Meds:   enoxparin  40 mg Subcutaneous Daily    ertapenem (INVANZ) IVPB  1 g Intravenous Q24H    gabapentin  200 mg Oral QHS    mirtazapine  30 mg Oral Nightly    tamsulosin  0.4 mg Oral Daily     Continuous Infusions:  PRN Meds:acetaminophen, butalbital-acetaminophen-caffeine -40 mg, iothalamate meglumine, morphine, oxyCODONE, sodium chloride 0.9%    Review of patient's allergies indicates:   Allergen Reactions    Bee sting [allergen ext-venom-honey bee]      Rash      Grass pollen-bermuda, standard      rash       Past Medical History:   Diagnosis Date    Abnormal mammogram 08/25/2020    Acute blood loss anemia     Acute deep vein thrombosis (DVT) of lower extremity 12/09/2020    Advance care planning 04/30/2021    Anemia due to chronic blood loss     Anemia due to chronic kidney disease     Anxiety     Bilateral ureteral obstruction 9/11/2020    Cardiovascular event risk -- low 09/14/2015    ASCVD 10-year risk 1.9% (with optimal risk factors 1.3%) as of 9/14/2015     Cervical cancer 2014    Chronic back pain     Colostomy care     " Deep vein thrombosis     Depression     Diarrhea due to malabsorption 11/14/2018    Difficult intubation     Disorder of kidney and ureter     DVT of lower extremity, bilateral 11/04/2020    Emphysema of lung 4/10/2023    Fibromyalgia     Fungemia 09/27/2020    Generalized abdominal pain 08/25/2020    GERD (gastroesophageal reflux disease)     Hemifacial spasm 09/16/2015    Hiatal hernia 2014    History of cervical cancer 10/11/2018    Hx of psychiatric care     Cymbalta, trazodone    Hypertension     Hypomagnesemia 11/21/2018    Lactose intolerance     Metastatic squamous cell carcinoma to lymph node 10/11/2018    Neuropathy due to chemotherapeutic drug 11/14/2018    Osteoarthritis of back     Peritonitis 09/22/2020    Pseudomonas urinary tract infection 04/21/2021    Psychiatric problem     Refusal of blood transfusions as patient is Taoist     Schatzki's ring 09/14/2015    Seen on outside EGD 05/2014, underwent esophageal dilatation. Bx were negative.     Seizures     Sleep stage dysfunction     Noted on PSG 06/2017; negative for obstructive sleep apnea     Stroke     Urinary tract infection associated with nephrostomy catheter 06/11/2020    Wound infection after surgery 09/24/2020     Past Surgical History:   Procedure Laterality Date    ANTEGRADE NEPHROSTOGRAPHY Bilateral 9/28/2020    Procedure: Nephrostogram - antegrade;  Surgeon: Leslie Balbuena MD;  Location: [address];  Service: Urology;  Laterality: Bilateral;    ANTEGRADE NEPHROSTOGRAPHY Left 4/20/2021    Procedure: NEPHROSTOGRAM, ANTEGRADE;  Surgeon: Leslie Balbuena MD;  Location: [address];  Service: Urology;  Laterality: Left;    ANTEGRADE NEPHROSTOGRAPHY Right 10/27/2022    Procedure: NEPHROSTOGRAM, ANTEGRADE;  Surgeon: Chirag Russ MD;  Location: [address];  Service: Urology;  Laterality: Right;    ANTEGRADE NEPHROSTOGRAPHY Right 4/21/2023    Procedure: NEPHROSTOGRAM, ANTEGRADE;  Surgeon: Chirag Russ MD;   Location: [facility] OR 2ND FLR;  Service: Urology;  Laterality: Right;    BILATERAL OOPHORECTOMY  2015    CHOLECYSTECTOMY  11/09/2016    Done at [facility], path showed chronic cholecystitis and gallstones    cold knife conization  2014    COLECTOMY, RIGHT  9/17/2020    Procedure: COLECTOMY, RIGHT Extended;  Surgeon: Hammad Reynolds MD;  Location: [facility] OR 2ND FLR;  Service: Colon and Rectal;;    COLONOSCOPY  2014    COLONOSCOPY N/A 10/24/2016    at [facility], Dr Gutiérrez    COLONOSCOPY N/A 5/18/2018    Procedure: COLONOSCOPY;  Surgeon: Arden Gutiérrez MD;  Location: [facility] (4TH FLR);  Service: Endoscopy;  Laterality: N/A;    COLONOSCOPY N/A 7/28/2020    Procedure: COLONOSCOPY;  Surgeon: Hammad Reynolds MD;  Location: [facility] (4TH FLR);  Service: Colon and Rectal;  Laterality: N/A;  covid test [place] 7/25    CYSTOSCOPY WITH URETEROSCOPY, RETROGRADE PYELOGRAPHY, AND INSERTION OF STENT Bilateral 3/21/2020    Procedure: CYSTOSCOPY, WITH RETROGRADE PYELOGRAM,;  Surgeon: Leslie Balbuena MD;  Location: [facility] OR 1ST FLR;  Service: Urology;  Laterality: Bilateral;    DILATION OF NEPHROSTOMY TRACT Right 10/27/2022    Procedure: DILATION, NEPHROSTOMY TRACT;  Surgeon: Chirag Russ MD;  Location: [facility] OR 1ST FLR;  Service: Urology;  Laterality: Right;    ESOPHAGOGASTRODUODENOSCOPY  2014    ESOPHAGOGASTRODUODENOSCOPY  11/18/2020    ESOPHAGOGASTRODUODENOSCOPY N/A 11/18/2020    Procedure: ESOPHAGOGASTRODUODENOSCOPY (EGD);  Surgeon: Zenon Spencer MD;  Location: [facility];  Service: Endoscopy;  Laterality: N/A;    ESOPHAGOGASTRODUODENOSCOPY N/A 12/11/2020    Procedure: EGD (ESOPHAGOGASTRODUODENOSCOPY);  Surgeon: Juancho Muse MD;  Location: [facility] (2ND FLR);  Service: Endoscopy;  Laterality: N/A;    HYSTERECTOMY  2014    [facility] for cervical cancer    ILEOSTOMY  9/17/2020    Procedure: CREATION, ILEOSTOMY;  Surgeon: Hammad Reynolds MD;  Location: [facility] OR 2ND FLR;  Service: Colon and Rectal;;    LOOPOGRAM N/A 4/20/2021     Procedure: LOOPOGRAM;  Surgeon: Leslie Balbuena MD;  Location: [facility] OR 1ST FLR;  Service: Urology;  Laterality: N/A;    MOBILIZATION OF SPLENIC FLEXURE N/A 9/11/2020    Procedure: MOBILIZATION, COLONIC;  Surgeon: Hammad Reynolds MD;  Location: [facility] OR 2ND FLR;  Service: Colon and Rectal;  Laterality: N/A;    NEPHROSTOGRAPHY Bilateral 4/17/2021    Procedure: Nephrostogram;  Surgeon: Celeste Surgeon;  Location: [facility];  Service: Anesthesiology;  Laterality: Bilateral;    OOPHORECTOMY      PERCUTANEOUS NEPHROLITHOTOMY Right 4/21/2023    Procedure: NEPHROLITHOTOMY, PERCUTANEOUS;  Surgeon: Chirag Russ MD;  Location: [facility] OR 2ND FLR;  Service: Urology;  Laterality: Right;  2.5 hrs    PERCUTANEOUS NEPHROSTOMY  4/21/2023    Procedure: CREATION, NEPHROSTOMY, PERCUTANEOUS with removal of existing nephrostomy tube;  Surgeon: Chirag Russ MD;  [facility];  Service: Urology;;    PYELOSCOPY Right 10/27/2022    Procedure: PYELOSCOPY;  Surgeon: Chirag Russ MD;  [facility];  Service: Urology;  Laterality: Right;    REPLACEMENT OF NEPHROSTOMY TUBE Right 10/27/2022    Procedure: REPLACEMENT, NEPHROSTOMY TUBE;  Surgeon: Chirag Russ MD;  [facility];  Service: Urology;  Laterality: Right;    RETROPERITONEAL LYMPHADENECTOMY Right 9/17/2018    Procedure: LYMPHADENECTOMY, RETROPERITONEUM;  Surgeon: Sebas Reed MD;  [facility];  Service: General;  Laterality: Right;  ILIAC    URETEROSCOPIC REMOVAL OF URETERIC CALCULUS Right 10/27/2022    Procedure: REMOVAL, CALCULUS, URETER, URETEROSCOPIC;  Surgeon: Chirag Russ MD;  [facility];  Service: Urology;  Laterality: Right;    URETEROSCOPY Right 10/27/2022    Procedure: URETEROSCOPY-ANTEGRADE;  Surgeon: Chirag Russ MD;  [facility];  Service: Urology;  Laterality: Right;     Family History   Problem Relation Age of Onset    Heart disease Sister     Heart disease  Maternal Grandmother     Colon cancer Father     Esophageal cancer Father     Cancer Father         Lung-smoker    Cancer Mother         Cervical    Cervical cancer Mother     Breast cancer Maternal Aunt     Suicide Daughter         jumped from parking structure    Drug abuse Daughter     Drug abuse Daughter     Rectal cancer Neg Hx     Stomach cancer Neg Hx     Crohn's disease Neg Hx     Ulcerative colitis Neg Hx     Diabetes Neg Hx     Hypertension Neg Hx      Social History     Tobacco Use    Smoking status: Never    Smokeless tobacco: Never   Substance Use Topics    Alcohol use: No     Alcohol/week: 0.0 standard drinks    Drug use: No       OBJECTIVE:     Vital Signs (Most Recent)  Temp: 97.9 °F (36.6 °C) (06/06/23 0750)  Pulse: 67 (06/06/23 0815)  Resp: 20 (06/06/23 0815)  BP: (!) 111/57 (06/06/23 0815)  SpO2: 100 % (06/06/23 0815)    Physical Exam:  Physical Exam  Constitutional:       Appearance: Normal appearance.   HENT:      Head: Normocephalic.   Cardiovascular:      Rate and Rhythm: Normal rate.   Pulmonary:      Effort: Pulmonary effort is normal.   Abdominal:      General: Abdomen is flat.      Comments: L neph tube in place   Neurological:      Mental Status: She is alert. Mental status is at baseline.   Psychiatric:         Mood and Affect: Mood normal.       Laboratory  I have reviewed all pertinent lab results within the past 24 hours.  CBC:   Recent Labs   Lab 06/06/23  0417   WBC 8.28   RBC 3.97*   HGB 10.7*   HCT 36.2*      MCV 91   MCH 27.0   MCHC 29.6*     CMP:   Recent Labs   Lab 05/30/23  1855 05/31/23  0510 06/06/23  0417   GLU 92   < > 74   CALCIUM 10.0   < > 9.5   ALBUMIN 3.3*  --   --    PROT 8.1  --   --       < > 140   K 4.4   < > 3.7   CO2 17*   < > 22*   *   < > 112*   BUN 31*   < > 17   CREATININE 1.7*   < > 1.3   ALKPHOS 156*  --   --    ALT 15  --   --    AST 15  --   --    BILITOT 0.2  --   --     < > = values in this interval not displayed.     Coagulation: No  results for input(s): LABPROT, INR, APTT in the last 168 hours.    ASA/Mallampati  ASA: 2  Mallampati: 2      ASSESSMENT/PLAN:     Assessment:  58 y.o. female who has been referred to IR for routine left neph tube exchange.  Last exchanged by IR 3/2023.     Plan:  Will proceed with routine LEFT nephrostomy tube exchange on 6/7/23  Sedation plan: up to moderate   Please keep pt NPO starting at midnight on day of procedure  Anticoagulation history reviewed.   Coagulation labs reviewed.  Thank you for the consult. Please contact with questions via [employer] secure chat.    Susnaa Phillips PA-C   Interventional Radiology  6/6/2023

## 2023-06-06 NOTE — TRANSFER OF CARE
"Anesthesia Transfer of Care Note    [facility]    Procedure(s) Performed: Procedure(s) (LRB):  Nephrostogram - antegrade (Left)  LOOPOGRAM (N/A)    Patient location: PACU    Anesthesia Type: general    Transport from OR: Transported from OR on room air with adequate spontaneous ventilation    Post pain: adequate analgesia    Post vital signs: stable    Level of consciousness: awake    Complications: none    Transfer of care protocol was followed      Last vitals:   Visit Vitals  /60 (BP Location: Left arm, Patient Position: Lying)   Pulse 74   Temp 36.6 °C (97.9 °F) (Temporal)   Resp 18   Ht 5' 9" (1.753 m)   Wt 83 kg (183 lb)   LMP 06/08/2014 (Approximate)   SpO2 99%   Breastfeeding No   BMI 27.02 kg/m²     "

## 2023-06-06 NOTE — ASSESSMENT & PLAN NOTE
- S/p Bilateral Nephrostomy tubes; R. Removed after recent complication and remains with L.Nephrostomy  - Continue with Flomax 0.4 mg, daily  - urology following.   -- s/p nephrostogram on 6/6/23.   -- consulted IR to evaluate for nephrostomy exchange on 6/7/23

## 2023-06-06 NOTE — ASSESSMENT & PLAN NOTE
-OR today for nephrostogram and loopogram  -Today plan to assess length of stricture  -NPO  -Will need IR to exchange nephrostomy tube while inpatient

## 2023-06-06 NOTE — SUBJECTIVE & OBJECTIVE
Interval History: Patient sitting in bed, no acute distress. No acute events overnight. Patient reports left flank pain better controlled around left nephrostomy tube. Tolerating IV ertapenem well. Stool studies negative and isolation discontinued. She is on Remeron 30 mg nightly and remains PEC'd given her grade disability in managing her own care.She underwent nephrostogram and plan for nephrostomy tube exchange tomorrow.       Review of Systems   Constitutional:  Positive for activity change and fatigue. Negative for chills and fever.   HENT:  Negative for congestion and trouble swallowing.    Eyes:  Negative for visual disturbance.   Respiratory:  Negative for cough and shortness of breath.    Cardiovascular:  Negative for chest pain and leg swelling.   Gastrointestinal:  Negative for abdominal distention, abdominal pain, nausea and vomiting.   Endocrine: Negative for cold intolerance, heat intolerance, polydipsia and polyuria.   Genitourinary:  Positive for flank pain. Negative for difficulty urinating and dysuria.   Musculoskeletal:  Negative for back pain and myalgias.   Skin:  Negative for rash and wound.   Neurological:  Positive for weakness. Negative for dizziness and light-headedness.   Hematological:  Negative for adenopathy. Does not bruise/bleed easily.   Psychiatric/Behavioral:  Negative for confusion and sleep disturbance.    Objective:     Vital Signs (Most Recent):  Temp: 97.2 °F (36.2 °C) (06/06/23 1037)  Pulse: 76 (06/06/23 1037)  Resp: 19 (06/06/23 1037)  BP: (!) 103/54 (06/06/23 1037)  SpO2: 99 % (06/06/23 1037) Vital Signs (24h Range):  Temp:  [97.2 °F (36.2 °C)-98.2 °F (36.8 °C)] 97.2 °F (36.2 °C)  Pulse:  [67-76] 76  Resp:  [17-20] 19  SpO2:  [96 %-100 %] 99 %  BP: (101-115)/(54-67) 103/54     Weight: 83 kg (183 lb)  Body mass index is 27.02 kg/m².    Intake/Output Summary (Last 24 hours) at 6/6/2023 1523  Last data filed at 6/6/2023 0752  Gross per 24 hour   Intake 760 ml   Output 1200 ml    Net -440 ml           Physical Exam  Constitutional:       Appearance: She is well-developed.   HENT:      Head: Normocephalic and atraumatic.   Eyes:      General: No scleral icterus.     Pupils: Pupils are equal, round, and reactive to light.   Neck:      Vascular: No JVD.   Cardiovascular:      Rate and Rhythm: Normal rate and regular rhythm.      Heart sounds: No murmur heard.    No friction rub. No gallop.   Pulmonary:      Effort: Pulmonary effort is normal. No respiratory distress.      Breath sounds: Normal breath sounds. No wheezing or rales.   Abdominal:      General: Bowel sounds are normal. There is no distension.      Palpations: Abdomen is soft.      Tenderness: There is no abdominal tenderness. There is no guarding or rebound.      Comments: Ileostomy tube with normal output   Genitourinary:     Comments: Left nephrostomy tube with normal output   Musculoskeletal:         General: No deformity. Normal range of motion.      Cervical back: Neck supple.   Lymphadenopathy:      Cervical: No cervical adenopathy.   Skin:     General: Skin is warm and dry.      Capillary Refill: Capillary refill takes less than 2 seconds.      Findings: No erythema or rash.   Neurological:      Mental Status: She is alert and oriented to person, place, and time.      Cranial Nerves: No cranial nerve deficit.      Sensory: No sensory deficit.   Psychiatric:         Mood and Affect: Mood normal.           Significant Labs: A1C: No results for input(s): HGBA1C in the last 4320 hours.  Blood Culture:   No results for input(s): LABBLOO in the last 48 hours.    CBC:   Recent Labs   Lab 06/05/23  0500 06/06/23  0417   WBC 7.08 8.28   HGB 11.5* 10.7*   HCT 39.1 36.2*    245       CMP:   Recent Labs   Lab 06/05/23  0500 06/06/23  0417    140   K 3.8 3.7   * 112*   CO2 20* 22*   GLU 84 74   BUN 18 17   CREATININE 1.2 1.3   CALCIUM 9.4 9.5   ANIONGAP 7* 6*       No results for input(s): COLORU, APPEARANCEUA, PHUR,  SPECGRAV, PROTEINUA, GLUCUA, KETONESU, BILIRUBINUA, OCCULTUA, NITRITE, UROBILINOGEN, LEUKOCYTESUR, RBCUA, WBCUA, BACTERIA, SQUAMEPITHEL, HYALINECASTS in the last 48 hours.    Invalid input(s): [facility]      Microbiology Results (last 7 days)       Procedure Component Value Units Date/Time    Stool culture [[id number]] Collected: 06/05/23 1335    Order Status: Completed Specimen: Stool Updated: 06/06/23 1112     Stool Culture Culture in progress    Clostridium difficile EIA [[id number]] Collected: 06/05/23 1335    Order Status: Completed Specimen: Stool Updated: 06/05/23 2114     C. diff Antigen Negative     C difficile Toxins A+B, EIA Negative     Comment: Testing not recommended for children <24 months old.       E. coli 0157 antigen [[id number]] Collected: 06/05/23 1335    Order Status: No result Specimen: Stool Updated: 06/05/23 1607    Blood culture x two cultures. Draw prior to antibiotics. [[id number]] Collected: 05/30/23 1845    Order Status: Completed Specimen: Blood from Peripheral, Wrist, Right Updated: 06/04/23 2022     Blood Culture, Routine No growth after 5 days.    Narrative:      Aerobic and anaerobic    Blood culture x two cultures. Draw prior to antibiotics. [[id number]] Collected: 05/30/23 1900    Order Status: Completed Specimen: Blood from Peripheral, Wrist, Left Updated: 06/04/23 2022     Blood Culture, Routine No growth after 5 days.    Narrative:      Aerobic and anaerobic    Urine culture [[id number]]  (Abnormal)  (Susceptibility) Collected: 05/30/23 2107    Order Status: Completed Specimen: Urine Updated: 06/01/23 2251     Urine Culture, Routine ENTEROBACTER CLOACAE  > 100,000 cfu/ml      Narrative:      Specimen Source->Urine              Significant Imaging: I have reviewed all pertinent imaging results/findings within the past 24 hours.

## 2023-06-06 NOTE — OP NOTE
[facility]  Urology Department  Operative Note    SUMMARY     Date of Procedure: 06/06/2023    Pre-Operative Diagnosis:   History of transverse colon conduit  Left ureterocolonic anastomotic stricture    Patient Active Problem List    Diagnosis Date Noted    Major depressive disorder, recurrent episode, moderate 06/02/2023    Arthritis 04/10/2023    Emphysema of lung 04/10/2023    Edema 04/10/2023    Refusal of blood transfusions as patient is Zoroastrianism 04/10/2023    Migraine without aura and without status migrainosus, not intractable 02/17/2023    UTI (urinary tract infection) 02/16/2023    CKD (chronic kidney disease), stage III 02/14/2023    Preprocedural cardiovascular examination 10/25/2022    Physical deconditioning 04/30/2021    Discharge planning issues 04/30/2021    Nephrostomy status     Depression     Moderate episode of recurrent major depressive disorder 04/21/2021    Hard to intubate 04/20/2021    Metabolic acidosis 04/17/2021    QT prolongation 04/16/2021    Presence of urostomy 02/08/2021    History of DVT (deep vein thrombosis) 02/03/2021    Ileostomy in place     Vitamin D deficiency 01/04/2021    Moderate malnutrition 09/20/2020    Radiation cystitis 09/04/2020    Abnormal mammogram 08/25/2020    Family history of colon cancer 07/28/2020    Hydronephrosis 06/11/2020    Anemia due to chronic kidney disease 05/18/2020    ODESSA (acute kidney injury) 03/21/2020    Seizure-like activity 12/02/2018    Neuropathy due to chemotherapeutic drug 11/14/2018    Generalized anxiety disorder 10/17/2018    PTSD (post-traumatic stress disorder) 10/17/2018    History of cervical cancer 10/11/2018    Metastatic squamous cell carcinoma to lymph node 10/11/2018    Carpal tunnel syndrome on right 07/20/2017    Severe episode of recurrent major depressive disorder, with psychotic features 09/16/2015    Fibromyalgia 09/16/2015    Colon polyp 09/14/2015    Internal hemorrhoids 09/14/2015     Lower extremity pain, diffuse 07/24/2015    Weakness of both lower extremities 07/24/2015    Impaired functional mobility, balance, gait, and endurance 07/24/2015    Cervical cancer 05/04/2015    Osteoarthritis of back 05/04/2015    History of essential hypertension 05/04/2015     Post-Operative Diagnosis: same    Procedure:   1. Loopogram  2. Left antegrade nephrostogram  3. Fluoro <1 hr    Primary: Leslie Balbuena MD  Resident - Assisting: Tung Middleton MD; Gokul Conway MD    Anesthesia: General    Operative Findings:   Reflux visualized on the right. No reflux on the left.  Left distal ureteral stricture beginning at the level of the inferior portion of L4 with no contrast opacifying the ureter past this point.    Estimated Blood Loss (EBL): minimal    Drains: Let nephrostomy tube from prior    Specimens: none    Indications:   Edita Riley is a 58 y.o. female with a history of bilateral distal ureteral strictures d/t XRT for cervical cancer. She is s/p transverse colon conduit and subsequently presented with bacteremia, failure to thrive, bilateral hydronephrosis. She underwent bilateral nephrostomy tube placement with IR. She has had stones within her right upper tract and recently underwent right PCNL. She passed a clamping trial of her right nephrostomy tube so it was removed. She still has a left nephrostomy tube in place. She presents today for loopogram and left antegrade nephrostogram to evaluate for ureterocolonic anastomotic stricture.     Description of the Procedure:     Prior to proceeding to the operating room, the procedure was described in detail to the patient, all questions were answered and appropriate consent was obtained. The patient was then taken to the operating room. TEDs and SCDs were applied and confirmed to be working. Anesthesia was induced and the patient was placed in the supine position. The patient was then prepped in standard sterile fashion. Time-out was  held and perioperative antibiotics were confirmed.    We began by placing a 16 Fr nino catheter through the urostomy stoma. This was widely patent and could accommodate a finger without evidence of stenosis. 8 cc were filled in the balloon. Contrast was injected through the nino and a loopogram was performed using intermittent fluoroscopy. The right side was seen to reflux after a significant amount of contrast instillation. There was no reflux on the left. We then performed an antegrade nephrostogram on the left. The contrast was seen to fill out the proximal ureter with a transition point at the level of the inferior portion of L4 with no contrast opacifying the ureter past this point.    The patient tolerated the procedure well. A new urostomy appliance and bag were applied and her left nephrostomy tube was connected to a new drainage bag. No complications occurred during or immediately after the procedure. The patient was taken to the PACU satisfactory condition.     Disposition: The patient will return to the care of the hospital medicine team.    MD ISAAK Hannah was present for the entire case and agree with the above note.    NOTE:  The patient's treatment team was notified that the percutaneous nephrostomy tube is due to be changed.  Requested that it be done during this admission before she is transferred to the in patient psychiatric facility.

## 2023-06-06 NOTE — ASSESSMENT & PLAN NOTE
Bilateral flank pain  - Recurrent Urinary tact infection related to nephrostomy tube and chronic uretral strictures  - Follow up urine cultures and blood cultures  - Urine culture 4/10 growing klebsiella and enterococcus.  - followup urine cultures   - prn percocet and prn IV morphine   - ID consulted. apprec recs  -- urine cx grew Enterobacter   -- changed meropenem to ertapenem to complete 10 day course. Stop day for ertepenum is 6/9. May be discharged back to [facility]. Has midline.

## 2023-06-06 NOTE — ANESTHESIA POSTPROCEDURE EVALUATION
Anesthesia Post Evaluation    [facility]    Procedure(s) Performed: Procedure(s) (LRB):  Nephrostogram - antegrade (Left)  LOOPOGRAM (N/A)    Final Anesthesia Type: general      Patient location during evaluation: PACU  Patient participation: Yes- Able to Participate  Level of consciousness: awake and alert  Post-procedure vital signs: reviewed and stable  Pain management: adequate  Airway patency: patent    PONV status at discharge: No PONV  Anesthetic complications: no      Cardiovascular status: blood pressure returned to baseline  Respiratory status: unassisted, spontaneous ventilation and room air  Hydration status: euvolemic            Vitals Value Taken Time   /59 06/06/23 0830   Temp 36.6 °C (97.9 °F) 06/06/23 0750   Pulse 69 06/06/23 0830   Resp 20 06/06/23 0830   SpO2 100 % 06/06/23 0830         No case tracking events are documented in the log.      Pain/Caitie Score: Pain Rating Prior to Med Admin: 8 (6/6/2023  4:21 AM)  Pain Rating Post Med Admin: 0 (6/6/2023  4:51 AM)  Caitie Score: 10 (6/6/2023  8:00 AM)

## 2023-06-06 NOTE — SUBJECTIVE & OBJECTIVE
Interval History: Patient lying in bed, no acute distress. No acute events overnight. Patient reports continued fatigue and bilateral flank pain that is controlled when she receives pain regimen. Tolerating IV ertapenem well but does note more liquid stool from colostomy. She is on Remeron 30 mg nightly and remains PEC'd given her grade disability in managing her own care. Arranging for psychiatry facility placement after nephrostogram and stool study results.       Review of Systems   Constitutional:  Positive for activity change and fatigue. Negative for chills and fever.   HENT:  Negative for congestion and trouble swallowing.    Eyes:  Negative for visual disturbance.   Respiratory:  Negative for cough and shortness of breath.    Cardiovascular:  Negative for chest pain and leg swelling.   Gastrointestinal:  Negative for abdominal distention, abdominal pain, nausea and vomiting.   Endocrine: Negative for cold intolerance, heat intolerance, polydipsia and polyuria.   Genitourinary:  Positive for flank pain. Negative for difficulty urinating and dysuria.   Musculoskeletal:  Negative for back pain and myalgias.   Skin:  Negative for rash and wound.   Neurological:  Positive for weakness. Negative for dizziness and light-headedness.   Hematological:  Negative for adenopathy. Does not bruise/bleed easily.   Psychiatric/Behavioral:  Negative for confusion and sleep disturbance.    Objective:     Vital Signs (Most Recent):  Temp: 98.4 °F (36.9 °C) (06/05/23 1523)  Pulse: 75 (06/05/23 1523)  Resp: 18 (06/05/23 1523)  BP: 114/69 (06/05/23 1523)  SpO2: 95 % (06/05/23 1523) Vital Signs (24h Range):  Temp:  [97.3 °F (36.3 °C)-99.3 °F (37.4 °C)] 98.4 °F (36.9 °C)  Pulse:  [75-85] 75  Resp:  [12-18] 18  SpO2:  [95 %-98 %] 95 %  BP: (114-126)/(55-71) 114/69     Weight: 83 kg (183 lb)  Body mass index is 27.02 kg/m².    Intake/Output Summary (Last 24 hours) at 6/5/2023 2110  Last data filed at 6/5/2023 1700  Gross per 24 hour    Intake 1090 ml   Output 1775 ml   Net -685 ml           Physical Exam  Constitutional:       Appearance: She is well-developed.   HENT:      Head: Normocephalic and atraumatic.   Eyes:      General: No scleral icterus.     Pupils: Pupils are equal, round, and reactive to light.   Neck:      Vascular: No JVD.   Cardiovascular:      Rate and Rhythm: Normal rate and regular rhythm.      Heart sounds: No murmur heard.    No friction rub. No gallop.   Pulmonary:      Effort: Pulmonary effort is normal. No respiratory distress.      Breath sounds: Normal breath sounds. No wheezing or rales.   Abdominal:      General: Bowel sounds are normal. There is no distension.      Palpations: Abdomen is soft.      Tenderness: There is no abdominal tenderness. There is no guarding or rebound.      Comments: Ileostomy tube with normal output   Genitourinary:     Comments: Left nephrostomy tube with normal output   Musculoskeletal:         General: No deformity. Normal range of motion.      Cervical back: Neck supple.   Lymphadenopathy:      Cervical: No cervical adenopathy.   Skin:     General: Skin is warm and dry.      Capillary Refill: Capillary refill takes less than 2 seconds.      Findings: No erythema or rash.   Neurological:      Mental Status: She is alert and oriented to person, place, and time.      Cranial Nerves: No cranial nerve deficit.      Sensory: No sensory deficit.   Psychiatric:         Mood and Affect: Mood normal.           Significant Labs: A1C: No results for input(s): HGBA1C in the last 4320 hours.  Blood Culture:   No results for input(s): LABBLOO in the last 48 hours.    CBC:   Recent Labs   Lab 06/04/23  0555 06/05/23  0500   WBC 6.96 7.08   HGB 11.3* 11.5*   HCT 36.9* 39.1    312       CMP:   Recent Labs   Lab 06/04/23  0555 06/05/23  0500    140   K 3.8 3.8   * 113*   CO2 21* 20*   GLU 84 84   BUN 15 18   CREATININE 1.3 1.2   CALCIUM 9.8 9.4   ANIONGAP 10 7*             Microbiology  Results (last 7 days)       Procedure Component Value Units Date/Time    Stool culture [[id number]] Collected: 06/05/23 1335    Order Status: Sent Specimen: Stool Updated: 06/05/23 1607    E. coli 0157 antigen [[id number]] Collected: 06/05/23 1335    Order Status: No result Specimen: Stool Updated: 06/05/23 1607    Clostridium difficile EIA [[id number]] Collected: 06/05/23 1335    Order Status: Sent Specimen: Stool Updated: 06/05/23 1607    Blood culture x two cultures. Draw prior to antibiotics. [[id number]] Collected: 05/30/23 1845    Order Status: Completed Specimen: Blood from Peripheral, Wrist, Right Updated: 06/04/23 2022     Blood Culture, Routine No growth after 5 days.    Narrative:      Aerobic and anaerobic    Blood culture x two cultures. Draw prior to antibiotics. [[id number]] Collected: 05/30/23 1900    Order Status: Completed Specimen: Blood from Peripheral, Wrist, Left Updated: 06/04/23 2022     Blood Culture, Routine No growth after 5 days.    Narrative:      Aerobic and anaerobic    Urine culture [[id number]]  (Abnormal)  (Susceptibility) Collected: 05/30/23 2107    Order Status: Completed Specimen: Urine Updated: 06/01/23 2251     Urine Culture, Routine ENTEROBACTER CLOACAE  > 100,000 cfu/ml      Narrative:      Specimen Source->Urine              Significant Imaging: I have reviewed all pertinent imaging results/findings within the past 24 hours.

## 2023-06-06 NOTE — ASSESSMENT & PLAN NOTE
- S/p Bilateral Nephrostomy tubes; R. Removed after recent complication and remains with L.Nephrostomy  - Continue with Flomax 0.4 mg, daily  - urology following.   -- plan for nephrostogram on 6/6/23

## 2023-06-06 NOTE — PROGRESS NOTES
[facility] (1st Fl)  Urology  Progress Note    Patient Name: Edita Riley  MRN: [id number]  Admission Date: 5/30/2023  Hospital Length of Stay: 1 days  Code Status: Full Code   Attending Provider: Dioni Brush MD   Primary Care Physician: Primary Doctor No    Subjective:     HPI:  No notes on file    Interval History: NAEO. AFVSS. Pt undergoing nephrostogram and loopogram today to assess stricture length    Review of Systems  Objective:     Temp:  [97.9 °F (36.6 °C)-98.5 °F (36.9 °C)] 97.9 °F (36.6 °C)  Pulse:  [72-82] 72  Resp:  [12-19] 18  SpO2:  [95 %-98 %] 97 %  BP: (101-118)/(55-69) 104/62     Body mass index is 27.02 kg/m².           Drains       Drain  Duration                  Urostomy 09/11/20 0000 ileal conduit  days         Ileostomy 09/18/20  days         Nephrostomy 03/28/23 0953 Left 12 Fr. 69 days                     Physical Exam  Vitals reviewed.   Constitutional:       General: She is not in acute distress.     Appearance: She is not toxic-appearing or diaphoretic.   HENT:      Head: Normocephalic and atraumatic.      Nose: Nose normal.   Eyes:      Conjunctiva/sclera: Conjunctivae normal.   Cardiovascular:      Rate and Rhythm: Normal rate.   Pulmonary:      Effort: Pulmonary effort is normal. No respiratory distress.   Abdominal:      General: Abdomen is flat. There is no distension.      Palpations: Abdomen is soft.      Comments: Urostomy draining c/y/u.  Left nephrostomy open to drainage.    Musculoskeletal:         General: No swelling or deformity. Normal range of motion.      Cervical back: Normal range of motion.   Skin:     General: Skin is warm and dry.      Findings: No erythema.   Neurological:      General: No focal deficit present.      Mental Status: She is alert.      Motor: No weakness.   Psychiatric:         Mood and Affect: Mood normal.         Behavior: Behavior normal.         Thought Content: Thought content normal.         Significant  Labs:    BMP:  Recent Labs   Lab 06/03/23  0339 06/04/23  0555 06/05/23  0500    144 140   K 3.9 3.8 3.8   * 113* 113*   CO2 19* 21* 20*   BUN 16 15 18   CREATININE 1.3 1.3 1.2   CALCIUM 9.6 9.8 9.4       CBC:   Recent Labs   Lab 06/03/23  0339 06/04/23  0555 06/05/23  0500   WBC 7.03 6.96 7.08   HGB 11.7* 11.3* 11.5*   HCT 39.9 36.9* 39.1    320 312       All pertinent labs results from the past 24 hours have been reviewed.    Significant Imaging:  All pertinent imaging results/findings from the past 24 hours have been reviewed.                    Assessment/Plan:     Nephrostomy status  -OR today for nephrostogram and loopogram  -Today plan to assess length of stricture  -NPO  -Will need IR to exchange nephrostomy tube while inpatient        VTE Risk Mitigation (From admission, onward)         Ordered     enoxaparin injection 40 mg  Daily         05/30/23 2228     IP VTE HIGH RISK PATIENT  Once         05/30/23 2228     Place sequential compression device  Until discontinued         05/30/23 2228                Tung Middleton MD  Urology  [facility] - Surgery ([facility])

## 2023-06-06 NOTE — H&P
Please see IR consult note dated 6/6/2023    Susana Phillips PA-C  Interventional Radiology  Spectra [phone]  6/6/2023

## 2023-06-06 NOTE — PROGRESS NOTES
[facility] Medicine  Progress Note    Patient Name: Edita Riley  MRN: [id number]  Patient Class: IP- Inpatient   Admission Date: 5/30/2023  Length of Stay: 1 days  Attending Physician: Dioni Brush MD  Primary Care Provider: Primary Doctor No        Subjective:     Principal Problem:UTI (urinary tract infection)        HPI:  58 year old female with Hx of distal ureteral strictures due to XRT for cervical cancer s/p transverse colon conduit and B/L nephrostomy tubes complicated by MDR infections who presents to the ED for UTI. Patient was discharged last month after presenting with recurrent UTI (E. Faecium VRE). History limited form patient. She had undergone right PCN drain removal and stone extraction with drain replacement. Completed a course of Ertapenem 1 g IV daily for 10 days and Linezolid and was discharged on Bactrim after ID evaluation. Patient denies any fever, chills, nausea, vomiting, hematemesis, cough, chest pain, palpitations, shortness of breath, abdominal pain/distension, changes in bowel or urinary habits, no hematochezia or melena.      Overview/Hospital Course:  Per urology, given good renal function, no indication for inpatient placement of right nephrostomy tube for the moderate right hyrdrouteronephrosis. Per ID, switched to ertapenem based on urine culture which grew Enterobacter and will complete 10 day total course. Midline consult placed. Psychiatry following and remeron increased to 30 mg nightly. Psychology following and SW obtaining records from [facility] in [place] for med list.    6/4-  ertapenem, EOC 6/9/23.  VSS.  Midline placed. She is Med Ready for discharge. PEC and CEC in place. CM working with [facility].    6/5- Tolerating IV ertapenem well but does note more liquid stool from colostomy. Stool studies pending. She is on Remeron 30 mg nightly and remains PEC'd given her grade disability in managing her own care given her  depression and severe bereavement. Given her h/o MDR UTI requiring IV ertapenem at this time, ureteral strictures s/p nephrostomy tube placement and need for repeat nephrostogram (scheduled for 6/6), need to wound care evaluation for colostomy care and refill of outpatient supplies and her grave disability due to MDD and severe bereavement, she requires continued inpatient care to ensure she has a safe discharge to the appropriate psychiatric facility that will have the resources to care for her. She currently lacks the ability to care for herself at home given the complexity of her medical care and her current apathy and grade disability. Arranging for psychiatry facility placement after nephrostogram and stool study results.         Interval History: Patient sitting in bed, no acute distress. No acute events overnight. Patient reports left flank pain better controlled around left nephrostomy tube. Tolerating IV ertapenem well. Stool studies negative and isolation discontinued. She is on Remeron 30 mg nightly and remains PEC'd given her grade disability in managing her own care.She underwent nephrostogram and plan for nephrostomy tube exchange tomorrow.       Review of Systems   Constitutional:  Positive for activity change and fatigue. Negative for chills and fever.   HENT:  Negative for congestion and trouble swallowing.    Eyes:  Negative for visual disturbance.   Respiratory:  Negative for cough and shortness of breath.    Cardiovascular:  Negative for chest pain and leg swelling.   Gastrointestinal:  Negative for abdominal distention, abdominal pain, nausea and vomiting.   Endocrine: Negative for cold intolerance, heat intolerance, polydipsia and polyuria.   Genitourinary:  Positive for flank pain. Negative for difficulty urinating and dysuria.   Musculoskeletal:  Negative for back pain and myalgias.   Skin:  Negative for rash and wound.   Neurological:  Positive for weakness. Negative for dizziness and  light-headedness.   Hematological:  Negative for adenopathy. Does not bruise/bleed easily.   Psychiatric/Behavioral:  Negative for confusion and sleep disturbance.    Objective:     Vital Signs (Most Recent):  Temp: 97.2 °F (36.2 °C) (06/06/23 1037)  Pulse: 76 (06/06/23 1037)  Resp: 19 (06/06/23 1037)  BP: (!) 103/54 (06/06/23 1037)  SpO2: 99 % (06/06/23 1037) Vital Signs (24h Range):  Temp:  [97.2 °F (36.2 °C)-98.2 °F (36.8 °C)] 97.2 °F (36.2 °C)  Pulse:  [67-76] 76  Resp:  [17-20] 19  SpO2:  [96 %-100 %] 99 %  BP: (101-115)/(54-67) 103/54     Weight: 83 kg (183 lb)  Body mass index is 27.02 kg/m².    Intake/Output Summary (Last 24 hours) at 6/6/2023 1523  Last data filed at 6/6/2023 0752  Gross per 24 hour   Intake 760 ml   Output 1200 ml   Net -440 ml           Physical Exam  Constitutional:       Appearance: She is well-developed.   HENT:      Head: Normocephalic and atraumatic.   Eyes:      General: No scleral icterus.     Pupils: Pupils are equal, round, and reactive to light.   Neck:      Vascular: No JVD.   Cardiovascular:      Rate and Rhythm: Normal rate and regular rhythm.      Heart sounds: No murmur heard.    No friction rub. No gallop.   Pulmonary:      Effort: Pulmonary effort is normal. No respiratory distress.      Breath sounds: Normal breath sounds. No wheezing or rales.   Abdominal:      General: Bowel sounds are normal. There is no distension.      Palpations: Abdomen is soft.      Tenderness: There is no abdominal tenderness. There is no guarding or rebound.      Comments: Ileostomy tube with normal output   Genitourinary:     Comments: Left nephrostomy tube with normal output   Musculoskeletal:         General: No deformity. Normal range of motion.      Cervical back: Neck supple.   Lymphadenopathy:      Cervical: No cervical adenopathy.   Skin:     General: Skin is warm and dry.      Capillary Refill: Capillary refill takes less than 2 seconds.      Findings: No erythema or rash.    Neurological:      Mental Status: She is alert and oriented to person, place, and time.      Cranial Nerves: No cranial nerve deficit.      Sensory: No sensory deficit.   Psychiatric:         Mood and Affect: Mood normal.           Significant Labs: A1C: No results for input(s): HGBA1C in the last 4320 hours.  Blood Culture:   No results for input(s): LABBLOO in the last 48 hours.    CBC:   Recent Labs   Lab 06/05/23  0500 06/06/23  0417   WBC 7.08 8.28   HGB 11.5* 10.7*   HCT 39.1 36.2*    245       CMP:   Recent Labs   Lab 06/05/23  0500 06/06/23  0417    140   K 3.8 3.7   * 112*   CO2 20* 22*   GLU 84 74   BUN 18 17   CREATININE 1.2 1.3   CALCIUM 9.4 9.5   ANIONGAP 7* 6*       No results for input(s): COLORU, APPEARANCEUA, PHUR, SPECGRAV, PROTEINUA, GLUCUA, KETONESU, BILIRUBINUA, OCCULTUA, NITRITE, UROBILINOGEN, LEUKOCYTESUR, RBCUA, WBCUA, BACTERIA, SQUAMEPITHEL, HYALINECASTS in the last 48 hours.    Invalid input(s): WRIGHTSUR      Microbiology Results (last 7 days)       Procedure Component Value Units Date/Time    Stool culture [[id number]] Collected: 06/05/23 1335    Order Status: Completed Specimen: Stool Updated: 06/06/23 1112     Stool Culture Culture in progress    Clostridium difficile EIA [[id number]] Collected: 06/05/23 1335    Order Status: Completed Specimen: Stool Updated: 06/05/23 2114     C. diff Antigen Negative     C difficile Toxins A+B, EIA Negative     Comment: Testing not recommended for children <24 months old.       E. coli 0157 antigen [[id number]] Collected: 06/05/23 1335    Order Status: No result Specimen: Stool Updated: 06/05/23 1607    Blood culture x two cultures. Draw prior to antibiotics. [[id number]] Collected: 05/30/23 1845    Order Status: Completed Specimen: Blood from Peripheral, Wrist, Right Updated: 06/04/23 2022     Blood Culture, Routine No growth after 5 days.    Narrative:      Aerobic and anaerobic    Blood culture x two cultures. Draw prior to  antibiotics. [[id number]] Collected: 05/30/23 1900    Order Status: Completed Specimen: Blood from Peripheral, Wrist, Left Updated: 06/04/23 2022     Blood Culture, Routine No growth after 5 days.    Narrative:      Aerobic and anaerobic    Urine culture [[id number]]  (Abnormal)  (Susceptibility) Collected: 05/30/23 2107    Order Status: Completed Specimen: Urine Updated: 06/01/23 2251     Urine Culture, Routine ENTEROBACTER CLOACAE  > 100,000 cfu/ml      Narrative:      Specimen Source->Urine              Significant Imaging: I have reviewed all pertinent imaging results/findings within the past 24 hours.      Assessment/Plan:      * UTI (urinary tract infection)  Bilateral flank pain  - Recurrent Urinary tact infection related to nephrostomy tube and chronic uretral strictures  - Follow up urine cultures and blood cultures  - Urine culture 4/10 growing klebsiella and enterococcus.  - followup urine cultures   - prn percocet and prn IV morphine   - ID consulted. apprec recs  -- urine cx grew Enterobacter   -- changed meropenem to ertapenem to complete 10 day course. Stop day for ertepenum is 6/9. May be discharged back to [facility]. Has midline.     Major depressive disorder, recurrent episode, moderate  Patient has persistent depression which is severe and is currently uncontrolled. Will Increase anti-depressant medications. We will consult psychiatry at this time. Patient does not display psychosis at this time. Continue to monitor closely and adjust plan of care as needed.  - patient's daughter committed suicide recently   - psychiatry and psychology following, apprec recs  - remeron 30 mg nightly   - SW obtaining medical records from [facility] in [place]    6/3 - per CM, the plan is to return to [facility]      Discharge planning issues  Given her h/o MDR UTI requiring IV ertapenem at this time, ureteral strictures s/p nephrostomy tube placement and need for repeat nephrostogram  (scheduled for 6/6), need to wound care evaluation for colostomy care and refill of outpatient supplies and her grave disability due to MDD and severe bereavement (requiring PEC at this time), she continues to warrant  inpatient care to ensure she has a safe discharge to the appropriate psychiatric facility that will have the resources to care for her. She currently lacks the ability to care for herself at home given the complexity of her medical care and her current apathy and grade disability. Arranging for psychiatry facility placement after nephrostogram and stool study results.       Nephrostomy status  - S/p Bilateral Nephrostomy tubes; R. Removed after recent complication and remains with L.Nephrostomy  - Continue with Flomax 0.4 mg, daily  - urology following.   -- s/p nephrostogram on 6/6/23.   -- consulted IR to evaluate for nephrostomy exchange on 6/7/23    Ileostomy in place  - S/p Transverse colon conduit 2020 complicate by bowel perforation requiring hemicolectomy & ileostomy  - increased watery stool from ileostomy on 6/5. Stool studies negative.   - started imodium prn  - Follow up outpatient CRS    ODESSA (acute kidney injury)  ODESSA on CKD  - Scr on admit, 1.7 --> 1.4  - Monitor renal function  - Avoid Nephrotoxic agents  - Monitor urine output   - Follow up Renal U/S  - IMPROVING     6/4- cr 1.3. pt eating and drinking      VTE Risk Mitigation (From admission, onward)         Ordered     enoxaparin injection 40 mg  Daily         05/30/23 2228     IP VTE HIGH RISK PATIENT  Once         05/30/23 2228     Place sequential compression device  Until discontinued         05/30/23 2228                Discharge Planning   OK: 6/7/2023     Code Status: Full Code   Is the patient medically ready for discharge?: No    Reason for patient still in hospital (select all that apply): Patient unstable, Patient trending condition, Laboratory test, Treatment and Consult recommendations  Discharge Plan A: Psychiatric  hospital   Discharge Delays: None known at this time        Time spent in care of patient: > 35 minutes         Dioni Brush MD  Department of Hospital Medicine   [facility]

## 2023-06-06 NOTE — ASSESSMENT & PLAN NOTE
- S/p Transverse colon conduit 2020 complicate by bowel perforation requiring hemicolectomy & ileostomy  - increased watery stool from ileostomy on 6/5. Stool studies negative.   - started imodium prn  - Follow up outpatient CRS

## 2023-06-06 NOTE — PLAN OF CARE
No overnight events, pt slept intermittently once prn pain meds given/at pt request x2, vitals stable for pt and remains on room air with adequate saturation, no leaking from ileostomy site until this morning and device changed with no complications, pt has been NPO since midnight for planned procedure today,care provided and gown changed at bedtime, safety maintained with sitter at bedside, will continue to monitor

## 2023-06-06 NOTE — SUBJECTIVE & OBJECTIVE
Interval History: NAEO. AFVSS. Pt undergoing nephrostogram and loopogram today to assess stricture length    Review of Systems  Objective:     Temp:  [97.9 °F (36.6 °C)-98.5 °F (36.9 °C)] 97.9 °F (36.6 °C)  Pulse:  [72-82] 72  Resp:  [12-19] 18  SpO2:  [95 %-98 %] 97 %  BP: (101-118)/(55-69) 104/62     Body mass index is 27.02 kg/m².           Drains       Drain  Duration                  Urostomy 09/11/20 0000 ileal conduit  days         Ileostomy 09/18/20  days         Nephrostomy 03/28/23 0953 Left 12 Fr. 69 days                     Physical Exam  Vitals reviewed.   Constitutional:       General: She is not in acute distress.     Appearance: She is not toxic-appearing or diaphoretic.   HENT:      Head: Normocephalic and atraumatic.      Nose: Nose normal.   Eyes:      Conjunctiva/sclera: Conjunctivae normal.   Cardiovascular:      Rate and Rhythm: Normal rate.   Pulmonary:      Effort: Pulmonary effort is normal. No respiratory distress.   Abdominal:      General: Abdomen is flat. There is no distension.      Palpations: Abdomen is soft.      Comments: Urostomy draining c/y/u.  Left nephrostomy open to drainage.    Musculoskeletal:         General: No swelling or deformity. Normal range of motion.      Cervical back: Normal range of motion.   Skin:     General: Skin is warm and dry.      Findings: No erythema.   Neurological:      General: No focal deficit present.      Mental Status: She is alert.      Motor: No weakness.   Psychiatric:         Mood and Affect: Mood normal.         Behavior: Behavior normal.         Thought Content: Thought content normal.         Significant Labs:    BMP:  Recent Labs   Lab 06/03/23  0339 06/04/23  0555 06/05/23  0500    144 140   K 3.9 3.8 3.8   * 113* 113*   CO2 19* 21* 20*   BUN 16 15 18   CREATININE 1.3 1.3 1.2   CALCIUM 9.6 9.8 9.4       CBC:   Recent Labs   Lab 06/03/23  0339 06/04/23  0555 06/05/23  0500   WBC 7.03 6.96 7.08   HGB 11.7* 11.3*  11.5*   HCT 39.9 36.9* 39.1    320 312       All pertinent labs results from the past 24 hours have been reviewed.    Significant Imaging:  All pertinent imaging results/findings from the past 24 hours have been reviewed.

## 2023-06-06 NOTE — PLAN OF CARE
Report called to floor RN. Made aware of need to keep pt NPO per MD Jarrod for possible IR procedure today. Pt to be transported to room 649 via transport with sitter at bedside.

## 2023-06-06 NOTE — PROGRESS NOTES
"CONSULTATION LIAISON PSYCHIATRY PROGRESS NOTE    Patient Name: Edita Riley  MRN: [id number]  Patient Class: IP- Inpatient  Admission Date: 2023  Attending Physician: Dioni Brush MD      SUBJECTIVE:   Edita Riley is a 58 y.o. female with past psychiatric history of depression & past pertinent medical history of cervical cancer s/p transverse colon conduit, bilateral nephrostomy tubes c/b recurrent pyelonephritis who presents to the ED/admitted to the hospital for UTI (urinary tract infection). She was transferred from [facility] where she is admitted under [facility] for depression and suicidal ideation.      Psychiatry consulted for "h/o depression admitted for recurrent UTI. Recent voluntary admission to [facility] for SI after her daughter committed suicide on 2023. PEC orderd. Consult for management of depression and SI."    Today, patient notes improvement in pain. States mood is improving, notes feeling less depressed. Is not having suicidal ideation today. Continues to report hopelessness but says this is starting to improve. Tolerating medication, denies any side effects. States appetite and sleep are good. Feeling better as pain also better managed. Denies HI. Occassionally hears voice of her  daughters. States "they would not want me to die" when asked about protective factors. Has been in contact with her . Would like to return to [facility] when medically cleared.        OBJECTIVE:    Mental Status Exam:  General Appearance:  appears stated age, fair grooming, casually dressed in hospital gown  Behavior: cooperative, friendly, appropriate eye-contact  Involuntary Movements and Motor Activity: no abnormal involuntary movements noted; no tics, no tremors, no akathisia, no dystonia, no evidence of tardive dyskinesia; no psychomotor agitation or retardation  Gait and Station: unable to assess - patient lying down or seated  Speech and Language: " "intact; normal rate, rhythm, volume and dysphoric tone; conversational, spontaneous, and coherent; speaks and understands English proficiently and fluently; repeats words and phrases, no word finding difficulties are noted  Mood: "getting better" Notes improvement in depression  Affect:  mood-congruent, dysphoric  Thought Process and Associations: linear, goal-directed, organized  Thought Content and Perceptions::  Denies SI/HI/AVH  Sensorium and Orientation: intact; alert with clear sensorium; oriented fully to person, place, time and situation  Recent and Remote Memory: grossly intact, able to recall relevant and salient information from the recent and remote past  Attention and Concentration: intact, attentive to conversation, able to spell WORLD forwards and backwards  Fund of Knowledge: grossly intact, used appropriate vocabulary and demonstrated an awareness of current events, consistent with educational level achieved  Insight: demonstrates awareness of illness + situation  Judgment: compliant with health provider's recommendations and instructions     CAM ICU positive? no      ASSESSMENT & RECOMMENDATIONS   MDD, recurrent, severe  Complicated bereavement     PSYCH MEDICATIONS  Scheduled  - Continue Remeron 30 mg qHS     RISK ASSESSMENT  NEEDS PEC for SI & NEEDS 1:1 sitter  PEC/CEC  2023 @ 6:20pm     FOLLOW UP  Will follow up while in house     DISPOSITION - once medically cleared:  Seek involuntary inpatient psychiatric admission for stabilization of acute psychiatric symptoms and a safe disposition plan is enacted. The patient was informed that the patient will be transferred to an inpatient unit per ED placement team. Return to [facility] following medical stability.      Please contact ON CALL psychiatry service () for any acute issues that may arise.    Dr. Jessy Becker  CL Psychiatry  [facility]  2023 11:43 " AM        --------------------------------------------------------------------------------------------------------------------------------------------------------------------------------------------------------------------------------------    CONTINUED OBJECTIVE clinical data & findings reviewed and noted for above decision making    Current Medications:   Scheduled Meds:    enoxparin  40 mg Subcutaneous Daily    ertapenem (INVANZ) IVPB  1 g Intravenous Q24H    gabapentin  200 mg Oral QHS    mirtazapine  30 mg Oral Nightly    tamsulosin  0.4 mg Oral Daily     PRN Meds: acetaminophen, butalbital-acetaminophen-caffeine -40 mg, loperamide, morphine, oxyCODONE, sodium chloride 0.9%    Allergies:   Review of patient's allergies indicates:   Allergen Reactions    Bee sting [allergen ext-venom-honey bee]      Rash      Grass pollen-bermuda, standard      rash       Vitals  Vitals:    06/06/23 1037   BP: (!) 103/54   Pulse: 76   Resp: 19   Temp: 97.2 °F (36.2 °C)       Labs/Imaging/Studies:  Recent Results (from the past 24 hour(s))   WBC, Stool    Collection Time: 06/05/23  1:34 PM   Result Value Ref Range    Stool WBC No neutrophils seen No neutrophils seen   Clostridium difficile EIA    Collection Time: 06/05/23  1:35 PM    Specimen: Stool   Result Value Ref Range    C. diff Antigen Negative Negative    C difficile Toxins A+B, EIA Negative Negative   Stool culture    Collection Time: 06/05/23  1:35 PM    Specimen: Stool   Result Value Ref Range    Stool Culture Culture in progress    Basic Metabolic Panel    Collection Time: 06/06/23  4:17 AM   Result Value Ref Range    Sodium 140 136 - 145 mmol/L    Potassium 3.7 3.5 - 5.1 mmol/L    Chloride 112 (H) 95 - 110 mmol/L    CO2 22 (L) 23 - 29 mmol/L    Glucose 74 70 - 110 mg/dL    BUN 17 6 - 20 mg/dL    Creatinine 1.3 0.5 - 1.4 mg/dL    Calcium 9.5 8.7 - 10.5 mg/dL    Anion Gap 6 (L) 8 - 16 mmol/L    eGFR 47.1 (A) >60 mL/min/1.73 m^2   CBC auto differential     Collection Time: 06/06/23  4:17 AM   Result Value Ref Range    WBC 8.28 3.90 - 12.70 K/uL    RBC 3.97 (L) 4.00 - 5.40 M/uL    Hemoglobin 10.7 (L) 12.0 - 16.0 g/dL    Hematocrit 36.2 (L) 37.0 - 48.5 %    MCV 91 82 - 98 fL    MCH 27.0 27.0 - 31.0 pg    MCHC 29.6 (L) 32.0 - 36.0 g/dL    RDW 15.6 (H) 11.5 - 14.5 %    Platelets 245 150 - 450 K/uL    MPV 10.3 9.2 - 12.9 fL    Immature Granulocytes 0.4 0.0 - 0.5 %    Gran # (ANC) 4.9 1.8 - 7.7 K/uL    Immature Grans (Abs) 0.03 0.00 - 0.04 K/uL    Lymph # 2.0 1.0 - 4.8 K/uL    Mono # 1.2 (H) 0.3 - 1.0 K/uL    Eos # 0.2 0.0 - 0.5 K/uL    Baso # 0.04 0.00 - 0.20 K/uL    nRBC 0 0 /100 WBC    Gran % 59.2 38.0 - 73.0 %    Lymph % 23.9 18.0 - 48.0 %    Mono % 13.9 4.0 - 15.0 %    Eosinophil % 2.1 0.0 - 8.0 %    Basophil % 0.5 0.0 - 1.9 %    Differential Method Automated      Imaging Results    None

## 2023-06-06 NOTE — ASSESSMENT & PLAN NOTE
Given her h/o MDR UTI requiring IV ertapenem at this time, ureteral strictures s/p nephrostomy tube placement and need for repeat nephrostogram (scheduled for 6/6), need to wound care evaluation for colostomy care and refill of outpatient supplies and her grave disability due to MDD and severe bereavement (requiring PEC at this time), she continues to warrant  inpatient care to ensure she has a safe discharge to the appropriate psychiatric facility that will have the resources to care for her. She currently lacks the ability to care for herself at home given the complexity of her medical care and her current apathy and grade disability. Arranging for psychiatry facility placement after nephrostogram and stool study results.

## 2023-06-06 NOTE — ASSESSMENT & PLAN NOTE
Patient has persistent depression which is severe and is currently uncontrolled. Will Increase anti-depressant medications. We will consult psychiatry at this time. Patient does not display psychosis at this time. Continue to monitor closely and adjust plan of care as needed.  - patient's daughter committed suicide recently   - psychiatry and psychology following, apprec recs  - remeron 30 mg nightly   - SW obtaining medical records from [facility] in [place]    6/3 - per CM, the plan is to return to [facility]

## 2023-06-07 LAB
ANION GAP SERPL CALC-SCNC: 7 MMOL/L (ref 8–16)
BACTERIA #/AREA URNS AUTO: ABNORMAL /HPF
BACTERIA STL CULT: NORMAL
BASOPHILS # BLD AUTO: 0.04 K/UL (ref 0–0.2)
BASOPHILS NFR BLD: 0.5 % (ref 0–1.9)
BILIRUB UR QL STRIP: NEGATIVE
BUN SERPL-MCNC: 18 MG/DL (ref 6–20)
CALCIUM SERPL-MCNC: 8.8 MG/DL (ref 8.7–10.5)
CHLORIDE SERPL-SCNC: 114 MMOL/L (ref 95–110)
CLARITY UR REFRACT.AUTO: ABNORMAL
CO2 SERPL-SCNC: 19 MMOL/L (ref 23–29)
COLOR UR AUTO: YELLOW
CREAT SERPL-MCNC: 1.3 MG/DL (ref 0.5–1.4)
DIFFERENTIAL METHOD: ABNORMAL
EOSINOPHIL # BLD AUTO: 0.2 K/UL (ref 0–0.5)
EOSINOPHIL NFR BLD: 2.3 % (ref 0–8)
ERYTHROCYTE [DISTWIDTH] IN BLOOD BY AUTOMATED COUNT: 15.1 % (ref 11.5–14.5)
EST. GFR  (NO RACE VARIABLE): 47.1 ML/MIN/1.73 M^2
GLUCOSE SERPL-MCNC: 86 MG/DL (ref 70–110)
GLUCOSE UR QL STRIP: NEGATIVE
HCT VFR BLD AUTO: 35 % (ref 37–48.5)
HGB BLD-MCNC: 10.9 G/DL (ref 12–16)
HGB UR QL STRIP: ABNORMAL
HYALINE CASTS UR QL AUTO: 2 /LPF
IMM GRANULOCYTES # BLD AUTO: 0.03 K/UL (ref 0–0.04)
IMM GRANULOCYTES NFR BLD AUTO: 0.3 % (ref 0–0.5)
KETONES UR QL STRIP: NEGATIVE
LEUKOCYTE ESTERASE UR QL STRIP: ABNORMAL
LYMPHOCYTES # BLD AUTO: 1.5 K/UL (ref 1–4.8)
LYMPHOCYTES NFR BLD: 17.7 % (ref 18–48)
MCH RBC QN AUTO: 27.5 PG (ref 27–31)
MCHC RBC AUTO-ENTMCNC: 31.1 G/DL (ref 32–36)
MCV RBC AUTO: 88 FL (ref 82–98)
MICROSCOPIC COMMENT: ABNORMAL
MONOCYTES # BLD AUTO: 1.1 K/UL (ref 0.3–1)
MONOCYTES NFR BLD: 13 % (ref 4–15)
NEUTROPHILS # BLD AUTO: 5.8 K/UL (ref 1.8–7.7)
NEUTROPHILS NFR BLD: 66.2 % (ref 38–73)
NITRITE UR QL STRIP: NEGATIVE
NRBC BLD-RTO: 0 /100 WBC
PH UR STRIP: 7 [PH] (ref 5–8)
PLATELET # BLD AUTO: 264 K/UL (ref 150–450)
PMV BLD AUTO: 9.3 FL (ref 9.2–12.9)
POCT GLUCOSE: 160 MG/DL (ref 70–110)
POCT GLUCOSE: 179 MG/DL (ref 70–110)
POCT GLUCOSE: 34 MG/DL (ref 70–110)
POCT GLUCOSE: 87 MG/DL (ref 70–110)
POTASSIUM SERPL-SCNC: 3.9 MMOL/L (ref 3.5–5.1)
PROT UR QL STRIP: ABNORMAL
RBC # BLD AUTO: 3.96 M/UL (ref 4–5.4)
RBC #/AREA URNS AUTO: 19 /HPF (ref 0–4)
SODIUM SERPL-SCNC: 140 MMOL/L (ref 136–145)
SP GR UR STRIP: 1.01 (ref 1–1.03)
URN SPEC COLLECT METH UR: ABNORMAL
WBC # BLD AUTO: 8.72 K/UL (ref 3.9–12.7)
WBC #/AREA URNS AUTO: 43 /HPF (ref 0–5)

## 2023-06-07 PROCEDURE — 99232 PR SUBSEQUENT HOSPITAL CARE,LEVL II: ICD-10-PCS | Mod: ,,, | Performed by: INTERNAL MEDICINE

## 2023-06-07 PROCEDURE — 25500020 PHARM REV CODE 255: Performed by: INTERNAL MEDICINE

## 2023-06-07 PROCEDURE — 63600175 PHARM REV CODE 636 W HCPCS: Performed by: PHYSICIAN ASSISTANT

## 2023-06-07 PROCEDURE — 99233 PR SUBSEQUENT HOSPITAL CARE,LEVL III: ICD-10-PCS | Mod: AF,HB,, | Performed by: PSYCHIATRY & NEUROLOGY

## 2023-06-07 PROCEDURE — 25000003 PHARM REV CODE 250: Performed by: STUDENT IN AN ORGANIZED HEALTH CARE EDUCATION/TRAINING PROGRAM

## 2023-06-07 PROCEDURE — 99233 SBSQ HOSP IP/OBS HIGH 50: CPT | Mod: AF,HB,, | Performed by: PSYCHIATRY & NEUROLOGY

## 2023-06-07 PROCEDURE — 81001 URINALYSIS AUTO W/SCOPE: CPT | Performed by: INTERNAL MEDICINE

## 2023-06-07 PROCEDURE — 99232 SBSQ HOSP IP/OBS MODERATE 35: CPT | Mod: ,,, | Performed by: INTERNAL MEDICINE

## 2023-06-07 PROCEDURE — 11000001 HC ACUTE MED/SURG PRIVATE ROOM

## 2023-06-07 PROCEDURE — 80048 BASIC METABOLIC PNL TOTAL CA: CPT | Performed by: STUDENT IN AN ORGANIZED HEALTH CARE EDUCATION/TRAINING PROGRAM

## 2023-06-07 PROCEDURE — 63600175 PHARM REV CODE 636 W HCPCS: Performed by: STUDENT IN AN ORGANIZED HEALTH CARE EDUCATION/TRAINING PROGRAM

## 2023-06-07 PROCEDURE — 36415 COLL VENOUS BLD VENIPUNCTURE: CPT | Performed by: STUDENT IN AN ORGANIZED HEALTH CARE EDUCATION/TRAINING PROGRAM

## 2023-06-07 PROCEDURE — 85025 COMPLETE CBC W/AUTO DIFF WBC: CPT | Performed by: STUDENT IN AN ORGANIZED HEALTH CARE EDUCATION/TRAINING PROGRAM

## 2023-06-07 PROCEDURE — 25000003 PHARM REV CODE 250: Performed by: INTERNAL MEDICINE

## 2023-06-07 PROCEDURE — 63600175 PHARM REV CODE 636 W HCPCS: Performed by: INTERNAL MEDICINE

## 2023-06-07 PROCEDURE — 25000003 PHARM REV CODE 250: Performed by: PHYSICIAN ASSISTANT

## 2023-06-07 RX ORDER — LIDOCAINE HYDROCHLORIDE 10 MG/ML
INJECTION INFILTRATION; PERINEURAL
Status: COMPLETED | OUTPATIENT
Start: 2023-06-07 | End: 2023-06-07

## 2023-06-07 RX ORDER — GLUCAGON 1 MG
1 KIT INJECTION
Status: DISCONTINUED | OUTPATIENT
Start: 2023-06-07 | End: 2023-06-13 | Stop reason: HOSPADM

## 2023-06-07 RX ORDER — MIDAZOLAM HYDROCHLORIDE 1 MG/ML
INJECTION INTRAMUSCULAR; INTRAVENOUS
Status: COMPLETED | OUTPATIENT
Start: 2023-06-07 | End: 2023-06-07

## 2023-06-07 RX ORDER — FENTANYL CITRATE 50 UG/ML
INJECTION, SOLUTION INTRAMUSCULAR; INTRAVENOUS
Status: COMPLETED | OUTPATIENT
Start: 2023-06-07 | End: 2023-06-07

## 2023-06-07 RX ORDER — DEXTROSE, SODIUM CHLORIDE, SODIUM LACTATE, POTASSIUM CHLORIDE, AND CALCIUM CHLORIDE 5; .6; .31; .03; .02 G/100ML; G/100ML; G/100ML; G/100ML; G/100ML
INJECTION, SOLUTION INTRAVENOUS CONTINUOUS
Status: DISCONTINUED | OUTPATIENT
Start: 2023-06-07 | End: 2023-06-07

## 2023-06-07 RX ORDER — DEXTROSE 40 %
30 GEL (GRAM) ORAL
Status: DISCONTINUED | OUTPATIENT
Start: 2023-06-07 | End: 2023-06-13 | Stop reason: HOSPADM

## 2023-06-07 RX ORDER — DEXTROSE 40 %
15 GEL (GRAM) ORAL
Status: DISCONTINUED | OUTPATIENT
Start: 2023-06-07 | End: 2023-06-13 | Stop reason: HOSPADM

## 2023-06-07 RX ADMIN — FENTANYL CITRATE 25 MCG: 0.05 INJECTION, SOLUTION INTRAMUSCULAR; INTRAVENOUS at 01:06

## 2023-06-07 RX ADMIN — MIRTAZAPINE 30 MG: 30 TABLET, FILM COATED ORAL at 09:06

## 2023-06-07 RX ADMIN — DEXTROSE MONOHYDRATE 125 ML: 100 INJECTION, SOLUTION INTRAVENOUS at 12:06

## 2023-06-07 RX ADMIN — GABAPENTIN 200 MG: 100 CAPSULE ORAL at 09:06

## 2023-06-07 RX ADMIN — OXYCODONE HYDROCHLORIDE 10 MG: 10 TABLET ORAL at 05:06

## 2023-06-07 RX ADMIN — IOHEXOL 15 ML: 300 INJECTION, SOLUTION INTRAVENOUS at 02:06

## 2023-06-07 RX ADMIN — MIDAZOLAM HYDROCHLORIDE 0.5 MG: 1 INJECTION INTRAMUSCULAR; INTRAVENOUS at 01:06

## 2023-06-07 RX ADMIN — TAMSULOSIN HYDROCHLORIDE 0.4 MG: 0.4 CAPSULE ORAL at 08:06

## 2023-06-07 RX ADMIN — LIDOCAINE HYDROCHLORIDE 5 ML: 10 INJECTION, SOLUTION INFILTRATION; PERINEURAL at 01:06

## 2023-06-07 RX ADMIN — SODIUM CHLORIDE, SODIUM LACTATE, POTASSIUM CHLORIDE, CALCIUM CHLORIDE AND DEXTROSE MONOHYDRATE 500 ML: 5; 600; 310; 30; 20 INJECTION, SOLUTION INTRAVENOUS at 11:06

## 2023-06-07 RX ADMIN — CEFTRIAXONE 1 G: 1 INJECTION, POWDER, FOR SOLUTION INTRAMUSCULAR; INTRAVENOUS at 01:06

## 2023-06-07 RX ADMIN — ERTAPENEM 1 G: 1 INJECTION INTRAMUSCULAR; INTRAVENOUS at 10:06

## 2023-06-07 RX ADMIN — ACETAMINOPHEN 325 MG: 325 TABLET ORAL at 05:06

## 2023-06-07 NOTE — PLAN OF CARE
Pt arrived to Interventional Radiology room 190 via stretcher with sitter at bedside for LEFT nephrostomy tube exchange .  Plan of care reviewed with patient, patient verbalized understanding.  Name verified using two identifiers.  Allergies verified.  Labs, orders and consent reviewed on chart.  Pt oriented to unit and staff.  See flow sheet for documentation, monitoring and medication administration. Will continue to monitor.

## 2023-06-07 NOTE — PLAN OF CARE
L neph tube exchange completed, pt tolerated well. No apparent distress noted. Dressing applied CDI. Pt to be transfer to MPU for 1 hour recovery, accompanied by RN and sitter, per Dr Soto. Report to be given at bedside.     Report called to AMY Brown.

## 2023-06-07 NOTE — ASSESSMENT & PLAN NOTE
ODESSA on CKD  - Scr on admit, 1.7 --> 1.3   - Monitor renal function  - Avoid Nephrotoxic agents  - Monitor urine output   - RESOVLED

## 2023-06-07 NOTE — ASSESSMENT & PLAN NOTE
- S/p Bilateral Nephrostomy tubes; R. Removed after recent complication and remains with L.Nephrostomy  - Continue with Flomax 0.4 mg, daily  - urology following.   -- s/p nephrogram on 6/6/23 which showed continued ureteral obstruction. Exchange of a percutaneous 12 Fr nephrostomy tube without complication on 6/7/2023 by IR

## 2023-06-07 NOTE — PROGRESS NOTES
[facility] Surg  Wound Care    Patient Name:  Edita Riley   MRN:  [id number]  Date: 6/6/2023  Diagnosis: UTI (urinary tract infection)    History:     Past Medical History:   Diagnosis Date    Abnormal mammogram 08/25/2020    Acute blood loss anemia     Acute deep vein thrombosis (DVT) of lower extremity 12/09/2020    Advance care planning 04/30/2021    Anemia due to chronic blood loss     Anemia due to chronic kidney disease     Anxiety     Bilateral ureteral obstruction 9/11/2020    Cardiovascular event risk -- low 09/14/2015    ASCVD 10-year risk 1.9% (with optimal risk factors 1.3%) as of 9/14/2015     Cervical cancer 2014    Chronic back pain     Colostomy care     Deep vein thrombosis     Depression     Diarrhea due to malabsorption 11/14/2018    Difficult intubation     Disorder of kidney and ureter     DVT of lower extremity, bilateral 11/04/2020    Emphysema of lung 4/10/2023    Fibromyalgia     Fungemia 09/27/2020    Generalized abdominal pain 08/25/2020    GERD (gastroesophageal reflux disease)     Hemifacial spasm 09/16/2015    Hiatal hernia 2014    History of cervical cancer 10/11/2018    Hx of psychiatric care     Cymbalta, trazodone    Hypertension     Hypomagnesemia 11/21/2018    Lactose intolerance     Metastatic squamous cell carcinoma to lymph node 10/11/2018    Neuropathy due to chemotherapeutic drug 11/14/2018    Osteoarthritis of back     Peritonitis 09/22/2020    Pseudomonas urinary tract infection 04/21/2021    Psychiatric problem     Refusal of blood transfusions as patient is Latter day     Schatzki's ring 09/14/2015    Seen on outside EGD 05/2014, underwent esophageal dilatation. Bx were negative.     Seizures     Sleep stage dysfunction     Noted on PSG 06/2017; negative for obstructive sleep apnea     Stroke     Urinary tract infection associated with nephrostomy catheter 06/11/2020    Wound infection after surgery 09/24/2020       Social History     Socioeconomic  History    Marital status:      Spouse name: Hammad    Number of children: 2   Tobacco Use    Smoking status: Never    Smokeless tobacco: Never   Substance and Sexual Activity    Alcohol use: No     Alcohol/week: 0.0 standard drinks    Drug use: No    Sexual activity: Yes     Partners: Male     Birth control/protection: None     Comment:  19 years since    Social History Narrative    , twin daughters (1  2018), disabled due to childhood stroke, Mu-ism sophia     Social Determinants of Health     Financial Resource Strain: Low Risk     Difficulty of Paying Living Expenses: Not very hard   Food Insecurity: No Food Insecurity    Worried About Running Out of Food in the Last Year: Never true    Ran Out of Food in the Last Year: Never true   Transportation Needs: No Transportation Needs    Lack of Transportation (Medical): No    Lack of Transportation (Non-Medical): No   Physical Activity: Inactive    Days of Exercise per Week: 0 days    Minutes of Exercise per Session: 0 min   Stress: No Stress Concern Present    Feeling of Stress : Only a little   Social Connections: Moderately Isolated    Frequency of Communication with Friends and Family: More than three times a week    Frequency of Social Gatherings with Friends and Family: More than three times a week    Attends Anabaptism Services: Never    Active Member of Clubs or Organizations: No    Attends Club or Organization Meetings: Never    Marital Status:    Housing Stability: Low Risk     Unable to Pay for Housing in the Last Year: No    Number of Places Lived in the Last Year: 1    Unstable Housing in the Last Year: No       Precautions:     Allergies as of 2023 - Reviewed 2023   Allergen Reaction Noted    Bee sting [allergen ext-venom-honey bee]  2015    Grass pollen-bermuda, standard  2015       [facility] Assessment Details/Treatment     Patient seen for consult for ostomy pouch leakage. She  has a colostomy and a urostomy. She is known to me from a previous visit to the ED, previous admissions and she has been seen in stoma clinic as well. Today both pouches are intact but the urostomy hole appears a bit small and the ileo appears as if it will leak soon. Both removed and changed. Her peristomal skin is only slightly denuded and her whole abdomen was scalded from stool in the past. No sting skin sealant was used on both pouches.   Additional supplies left at the bedside. Will plan to follow up with patient at another time. Nursing to continue care./ please reconsult if needed.      06/06/23 1820   WOCN Assessment   WOCN Total Time (mins) 45   Visit Date 06/06/23   Visit Time 1820   Consult Type New   WOCN Speciality Ostomy   WOCN List ileostomy;urostomy   Ostomy Type Ileostomy;Urostomy   Procedure ostomy pouch   Intervention changed;coordination of care   Teaching on-going        Ileostomy 09/18/20 RLQ   Placement Date: 09/18/20   Inserted by: MD  Location: RLQ   Stoma Appearance irregular;rosebud appearance;moist;pink   Stoma Size (in) 30mm   Appliance 1-piece;no leakage;changed   Accessories/Skin Care cleansed w/ water;skin sealant   Stoma Function flatus;stool   Peristomal Assessment Intact   Tolerance no signs/symptoms of discomfort        Urostomy 09/11/20 0000 ileal conduit LLQ   Date of First Assessment/Time of First Assessment: 09/11/20 0000   Present Prior to Hospital Arrival?: Yes  Inserted by: MD  Urostomy Type: ileal conduit  Location: LLQ   Stoma Appearance round;pink;moist   Stoma Size (in) 28   Stomal Appliance 1 piece;Intact   Accessories/Skin Care convex insert;barrier substance over peristomal skin;cleansed w/ water;skin barrier paste   Stoma Function yellow urine   Tolerance no signs/symptoms of discomfort         06/06/2023

## 2023-06-07 NOTE — SUBJECTIVE & OBJECTIVE
Interval History: Patient sitting in bed, no acute distress. No acute events overnight. Patient reports left flank pain better controlled. Tolerating IV ertapenem well and will be completed on 6/9/2023. Remains PEC'd for grave disability due to SI 2/2 depression and grief but per psychiatry, suicidal ideations are improving. Left antegrade nephrogram demonstrated continued ureteral obstruction so she underwent exchange of a percutaneous 12 Fr nephrostomy tube today. PT/OT ordered due to functional decline during this hospitalization.       Review of Systems   Constitutional:  Positive for activity change and fatigue. Negative for chills and fever.   HENT:  Negative for congestion and trouble swallowing.    Eyes:  Negative for visual disturbance.   Respiratory:  Negative for cough and shortness of breath.    Cardiovascular:  Negative for chest pain and leg swelling.   Gastrointestinal:  Negative for abdominal distention, abdominal pain, nausea and vomiting.   Endocrine: Negative for cold intolerance, heat intolerance, polydipsia and polyuria.   Genitourinary:  Positive for flank pain. Negative for difficulty urinating and dysuria.   Musculoskeletal:  Negative for back pain and myalgias.   Skin:  Negative for rash and wound.   Neurological:  Positive for weakness. Negative for dizziness and light-headedness.   Hematological:  Negative for adenopathy. Does not bruise/bleed easily.   Psychiatric/Behavioral:  Negative for confusion and sleep disturbance.    Objective:     Vital Signs (Most Recent):  Temp: 97.6 °F (36.4 °C) (06/07/23 1412)  Pulse: 74 (06/07/23 1454)  Resp: 16 (06/07/23 1454)  BP: (!) 95/54 (06/07/23 1454)  SpO2: 100 % (06/07/23 1454) Vital Signs (24h Range):  Temp:  [97.1 °F (36.2 °C)-100.8 °F (38.2 °C)] 97.6 °F (36.4 °C)  Pulse:  [74-90] 74  Resp:  [11-20] 16  SpO2:  [94 %-100 %] 100 %  BP: ()/(50-64) 95/54     Weight: 83 kg (183 lb)  Body mass index is 27.02 kg/m².    Intake/Output Summary (Last  24 hours) at 6/7/2023 1622  Last data filed at 6/7/2023 0546  Gross per 24 hour   Intake 520 ml   Output 1300 ml   Net -780 ml           Physical Exam  Constitutional:       Appearance: She is well-developed.   HENT:      Head: Normocephalic and atraumatic.   Eyes:      General: No scleral icterus.     Pupils: Pupils are equal, round, and reactive to light.   Neck:      Vascular: No JVD.   Cardiovascular:      Rate and Rhythm: Normal rate and regular rhythm.      Heart sounds: No murmur heard.    No friction rub. No gallop.   Pulmonary:      Effort: Pulmonary effort is normal. No respiratory distress.      Breath sounds: Normal breath sounds. No wheezing or rales.   Abdominal:      General: Bowel sounds are normal. There is no distension.      Palpations: Abdomen is soft.      Tenderness: There is no abdominal tenderness. There is no guarding or rebound.      Comments: Ileostomy tube with normal output   Genitourinary:     Comments: Left nephrostomy tube with normal output   Musculoskeletal:         General: No deformity. Normal range of motion.      Cervical back: Neck supple.   Lymphadenopathy:      Cervical: No cervical adenopathy.   Skin:     General: Skin is warm and dry.      Capillary Refill: Capillary refill takes less than 2 seconds.      Findings: No erythema or rash.   Neurological:      Mental Status: She is alert and oriented to person, place, and time.      Cranial Nerves: No cranial nerve deficit.      Sensory: No sensory deficit.   Psychiatric:         Mood and Affect: Mood normal.           Significant Labs: A1C: No results for input(s): HGBA1C in the last 4320 hours.  Blood Culture:   No results for input(s): LABBLOO in the last 48 hours.    CBC:   Recent Labs   Lab 06/06/23  0417 06/07/23  0551   WBC 8.28 8.72   HGB 10.7* 10.9*   HCT 36.2* 35.0*    264       CMP:   Recent Labs   Lab 06/06/23  0417 06/07/23  0551    140   K 3.7 3.9   * 114*   CO2 22* 19*   GLU 74 86   BUN 17 18    CREATININE 1.3 1.3   CALCIUM 9.5 8.8   ANIONGAP 6* 7*       Recent Labs   Lab 06/07/23  1109   COLORU Yellow   APPEARANCEUA Hazy*   PHUR 7.0   SPECGRAV 1.010   PROTEINUA 1+*   GLUCUA Negative   KETONESU Negative   BILIRUBINUA Negative   OCCULTUA 2+*   NITRITE Negative   LEUKOCYTESUR 3+*   RBCUA 19*   WBCUA 43*   BACTERIA Moderate*   HYALINECASTS 2*         Microbiology Results (last 7 days)       Procedure Component Value Units Date/Time    Stool culture [[id number]] Collected: 06/05/23 1335    Order Status: Completed Specimen: Stool Updated: 06/07/23 1541     Stool Culture No Salmonella,Shigella,Vibrio,Campylobacter,Yersinia isolated.    Clostridium difficile EIA [[id number]] Collected: 06/05/23 1335    Order Status: Completed Specimen: Stool Updated: 06/05/23 2114     C. diff Antigen Negative     C difficile Toxins A+B, EIA Negative     Comment: Testing not recommended for children <24 months old.       E. coli 0157 antigen [[id number]] Collected: 06/05/23 1335    Order Status: Canceled Specimen: Stool     Blood culture x two cultures. Draw prior to antibiotics. [[id number]] Collected: 05/30/23 1845    Order Status: Completed Specimen: Blood from Peripheral, Wrist, Right Updated: 06/04/23 2022     Blood Culture, Routine No growth after 5 days.    Narrative:      Aerobic and anaerobic    Blood culture x two cultures. Draw prior to antibiotics. [[id number]] Collected: 05/30/23 1900    Order Status: Completed Specimen: Blood from Peripheral, Wrist, Left Updated: 06/04/23 2022     Blood Culture, Routine No growth after 5 days.    Narrative:      Aerobic and anaerobic    Urine culture [[id number]]  (Abnormal)  (Susceptibility) Collected: 05/30/23 2107    Order Status: Completed Specimen: Urine Updated: 06/01/23 2251     Urine Culture, Routine ENTEROBACTER CLOACAE  > 100,000 cfu/ml      Narrative:      Specimen Source->Urine              Significant Imaging: I have reviewed all pertinent imaging results/findings within the  past 24 hours.

## 2023-06-07 NOTE — PROGRESS NOTES
"CONSULTATION LIAISON PSYCHIATRY PROGRESS NOTE    Patient Name: Edita Riley  MRN: [id number]  Patient Class: IP- Inpatient  Admission Date: 5/30/2023  Attending Physician: Dioni Brush MD      SUBJECTIVE:   Edita Riley is a 58 y.o. female with past psychiatric history of depression & past pertinent medical history of cervical cancer s/p transverse colon conduit, bilateral nephrostomy tubes c/b recurrent pyelonephritis who presents to the ED/admitted to the hospital for UTI (urinary tract infection). She was transferred from [facility] where she is admitted under [facility] for depression and suicidal ideation.      Psychiatry consulted for "h/o depression admitted for recurrent UTI. Recent voluntary admission to [facility] for SI after her daughter committed suicide on 5/11/2023. PEC orderd. Consult for management of depression and SI."    Today, patient resting in bed.  at bedside. States she continues to have pain to her back. States depression is improver, no longer having thoughts of wanting to die. She notes appetite is okay, currently NPO. Slept well overnight even in the setting of pain. Compliant with remeron and denies any noticeable side effects. Believe it is helping with sleep. Denies HI/AVH. Planning to have procedure today. Notes feeling more tired, especially in the setting of recent fever. Notes difficulty keeping her eye open this AM.      pulled physician aside. He voices concerns for patient returning to their apartment as there are evidence of daughters recent shooting. He would like to discuss discharge plans and worries about taking care of patient at home.  Will continue to evaluate the need for ongoing psychiatric care as she gets closer to discharge.        OBJECTIVE:    Mental Status Exam:  General Appearance:  appears stated age, fair grooming, casually dressed in hospital gown  Behavior: cooperative, appears tired, appropriate " "eye-contact  Involuntary Movements and Motor Activity: no abnormal involuntary movements noted; no tics, no tremors, no akathisia, no dystonia, no evidence of tardive dyskinesia; no psychomotor agitation or retardation  Gait and Station: unable to assess - patient lying down or seated  Speech and Language: intact; normal rate, rhythm, volume and dysphoric tone; conversational, spontaneous, and coherent; speaks and understands English proficiently and fluently; repeats words and phrases, no word finding difficulties are noted  Mood: "its okay" Notes improvement in depression  Affect:  mood-congruent, dysphoric  Thought Process and Associations: linear, goal-directed, organized  Thought Content and Perceptions::  Denies SI/HI/AVH  Sensorium and Orientation: intact; alert with clear sensorium; oriented fully to person, place, time and situation  Recent and Remote Memory: grossly intact, able to recall relevant and salient information from the recent and remote past  Attention and Concentration: intact, attentive to conversation, able to spell WORLD forwards and backwards  Fund of Knowledge: grossly intact, used appropriate vocabulary and demonstrated an awareness of current events, consistent with educational level achieved  Insight: demonstrates awareness of illness + situation  Judgment: compliant with health provider's recommendations and instructions     CAM ICU positive? no      ASSESSMENT & RECOMMENDATIONS   MDD, recurrent, severe  Complicated bereavement     PSYCH MEDICATIONS  Scheduled  - Continue Remeron 30 mg qHS     RISK ASSESSMENT  Discontinue PEC/CEC as patient is no longer a danger to herself, others or gravely disabled. Has been compliant with care. Denying suicidal ideation over multiple days.      FOLLOW UP  Will sign off     DISPOSITION - once medically cleared:  Per primary team. If patient is interested in pursuing voluntary admission, can contact [facility] on discharge.      Please contact ON CALL " psychiatry service (24/7) for any acute issues that may arise.    Dr. Jessy Becker  CL Psychiatry  [facility]  6/7/2023 12:48 PM        --------------------------------------------------------------------------------------------------------------------------------------------------------------------------------------------------------------------------------------    CONTINUED OBJECTIVE clinical data & findings reviewed and noted for above decision making    Current Medications:   Scheduled Meds:    ertapenem (INVANZ) IVPB  1 g Intravenous Q24H    gabapentin  200 mg Oral QHS    mirtazapine  30 mg Oral Nightly    tamsulosin  0.4 mg Oral Daily     PRN Meds: acetaminophen, butalbital-acetaminophen-caffeine -40 mg, dextrose 10%, dextrose 10%, dextrose, dextrose, glucagon (human recombinant), loperamide, morphine, oxyCODONE, sodium chloride 0.9%    Allergies:   Review of patient's allergies indicates:   Allergen Reactions    Bee sting [allergen ext-venom-honey bee]      Rash      Grass pollen-bermuda, standard      rash       Vitals  Vitals:    06/07/23 1013   BP: (!) 95/53   Pulse: 80   Resp: 19   Temp: 97.1 °F (36.2 °C)       Labs/Imaging/Studies:  Recent Results (from the past 24 hour(s))   Protime-INR (PT)    Collection Time: 06/06/23  4:41 PM   Result Value Ref Range    Prothrombin Time 10.2 9.0 - 12.5 sec    INR 0.9 0.8 - 1.2   Basic Metabolic Panel    Collection Time: 06/07/23  5:51 AM   Result Value Ref Range    Sodium 140 136 - 145 mmol/L    Potassium 3.9 3.5 - 5.1 mmol/L    Chloride 114 (H) 95 - 110 mmol/L    CO2 19 (L) 23 - 29 mmol/L    Glucose 86 70 - 110 mg/dL    BUN 18 6 - 20 mg/dL    Creatinine 1.3 0.5 - 1.4 mg/dL    Calcium 8.8 8.7 - 10.5 mg/dL    Anion Gap 7 (L) 8 - 16 mmol/L    eGFR 47.1 (A) >60 mL/min/1.73 m^2   CBC auto differential    Collection Time: 06/07/23  5:51 AM   Result Value Ref Range    WBC 8.72 3.90 - 12.70 K/uL    RBC 3.96 (L) 4.00 - 5.40 M/uL    Hemoglobin  10.9 (L) 12.0 - 16.0 g/dL    Hematocrit 35.0 (L) 37.0 - 48.5 %    MCV 88 82 - 98 fL    MCH 27.5 27.0 - 31.0 pg    MCHC 31.1 (L) 32.0 - 36.0 g/dL    RDW 15.1 (H) 11.5 - 14.5 %    Platelets 264 150 - 450 K/uL    MPV 9.3 9.2 - 12.9 fL    Immature Granulocytes 0.3 0.0 - 0.5 %    Gran # (ANC) 5.8 1.8 - 7.7 K/uL    Immature Grans (Abs) 0.03 0.00 - 0.04 K/uL    Lymph # 1.5 1.0 - 4.8 K/uL    Mono # 1.1 (H) 0.3 - 1.0 K/uL    Eos # 0.2 0.0 - 0.5 K/uL    Baso # 0.04 0.00 - 0.20 K/uL    nRBC 0 0 /100 WBC    Gran % 66.2 38.0 - 73.0 %    Lymph % 17.7 (L) 18.0 - 48.0 %    Mono % 13.0 4.0 - 15.0 %    Eosinophil % 2.3 0.0 - 8.0 %    Basophil % 0.5 0.0 - 1.9 %    Differential Method Automated    POCT glucose    Collection Time: 06/07/23 11:42 AM   Result Value Ref Range    POCT Glucose 34 (LL) 70 - 110 mg/dL   POCT glucose    Collection Time: 06/07/23 12:28 PM   Result Value Ref Range    POCT Glucose 160 (H) 70 - 110 mg/dL     Imaging Results    None

## 2023-06-07 NOTE — ASSESSMENT & PLAN NOTE
- Given her h/o MDR UTI requiring IV ertapenem at this time, ureteral strictures s/p nephrostomy tube placement and need for repeat nephrostogram (scheduled for 6/6), need to wound care evaluation for colostomy care and refill of outpatient supplies and her grave disability due to MDD and severe bereavement (requiring PEC at this time), she continues to warrant  inpatient care to ensure she has a safe discharge to the appropriate psychiatric facility that will have the resources to care for her. She currently lacks the ability to care for herself at home given the complexity of her medical care and her current apathy and grade disability.  - C diff negative so isolation discontinued   - per psychiatry, patient's SI is improving and she may not require inpatient psychiatry admission upon discharge. However, patient doesn't have insurance coverage for SNF so she would be discharge home with HH. Patient's  concerned about patient managing her care at home   - will continue discussions with psychiatry and case management to determine safest setting for patient   - PT/OT ordered

## 2023-06-07 NOTE — PROGRESS NOTES
[facility]  Psychology    Treatment Attempt  Patient Name: Edita Riley   MRN: [id number]      Patient off the floor for procedure. Psychology will attempt again next week.       Cipriano Holland, PhD  Dept. of Psychiatry  [facility]

## 2023-06-07 NOTE — PROGRESS NOTES
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 Patient was discharged last month after presenting with recurrent UTI (E. Faecium VRE). History limited form patient. She had undergone right PCN drain removal and stone extraction with drain replacement. Completed a course of Ertapenem 1 g IV daily for 10 days and Linezolid and was discharged on Bactrim after ID evaluation. Patient denies any fever, chills, nausea, vomiting, hematemesis, cough, chest pain, palpitations, shortness of breath, abdominal pain/distension, changes in bowel or urinary habits, no hematochezia or melena.      Overview/Hospital Course:  Per urology, given good renal function, no indication for inpatient placement of right nephrostomy tube for the moderate right hyrdrouteronephrosis. Per ID, switched to ertapenem based on urine culture which grew Enterobacter and will complete 10 day total course. Midline consult placed. Psychiatry following and remeron increased to 30 mg nightly. Psychology following and SW obtaining records from [facility] in [place] for med list.    6/4-  ertapenem, EOC 6/9/23.  VSS.  Midline placed. She is Med Ready for discharge. PEC and CEC in place. CM working with [facility].    6/5- Tolerating IV ertapenem well but does note more liquid stool from colostomy. Stool studies pending. She is on Remeron 30 mg nightly and remains PEC'd given her grade disability in managing her own care given her  depression and severe bereavement. Given her h/o MDR UTI requiring IV ertapenem at this time, ureteral strictures s/p nephrostomy tube placement and need for repeat nephrostogram (scheduled for 6/6), need to wound care evaluation for colostomy care and refill of outpatient supplies and her grave disability due to MDD and severe bereavement, she requires continued inpatient care to ensure she has a safe discharge to the appropriate psychiatric facility that will have the resources to care for her. She currently lacks the ability to care for herself at home given the complexity of her medical care and her current apathy and grade disability. Arranging for psychiatry facility placement after nephrostogram and stool study results.         Interval History: Patient sitting in bed, no acute distress. No acute events overnight. Patient reports left flank pain better controlled. Tolerating IV ertapenem well and will be completed on 6/9/2023. Remains PEC'd for grave disability due to SI 2/2 depression and grief but per psychiatry, suicidal ideations are improving. Left antegrade nephrogram demonstrated continued ureteral obstruction so she underwent exchange of a percutaneous 12 Fr nephrostomy tube today. PT/OT ordered due to functional decline during this hospitalization.       Review of Systems   Constitutional:  Positive for activity change and fatigue. Negative for chills and fever.   HENT:  Negative for congestion and trouble swallowing.    Eyes:  Negative for visual disturbance.   Respiratory:  Negative for cough and shortness of breath.    Cardiovascular:  Negative for chest pain and leg swelling.   Gastrointestinal:  Negative for abdominal distention, abdominal pain, nausea and vomiting.   Endocrine: Negative for cold intolerance, heat intolerance, polydipsia and polyuria.   Genitourinary:  Positive for flank pain. Negative for difficulty urinating and dysuria.   Musculoskeletal:  Negative for back pain and myalgias.    Skin:  Negative for rash and wound.   Neurological:  Positive for weakness. Negative for dizziness and light-headedness.   Hematological:  Negative for adenopathy. Does not bruise/bleed easily.   Psychiatric/Behavioral:  Negative for confusion and sleep disturbance.    Objective:     Vital Signs (Most Recent):  Temp: 97.6 °F (36.4 °C) (06/07/23 1412)  Pulse: 74 (06/07/23 1454)  Resp: 16 (06/07/23 1454)  BP: (!) 95/54 (06/07/23 1454)  SpO2: 100 % (06/07/23 1454) Vital Signs (24h Range):  Temp:  [97.1 °F (36.2 °C)-100.8 °F (38.2 °C)] 97.6 °F (36.4 °C)  Pulse:  [74-90] 74  Resp:  [11-20] 16  SpO2:  [94 %-100 %] 100 %  BP: ()/(50-64) 95/54     Weight: 83 kg (183 lb)  Body mass index is 27.02 kg/m².    Intake/Output Summary (Last 24 hours) at 6/7/2023 1622  Last data filed at 6/7/2023 0546  Gross per 24 hour   Intake 520 ml   Output 1300 ml   Net -780 ml           Physical Exam  Constitutional:       Appearance: She is well-developed.   HENT:      Head: Normocephalic and atraumatic.   Eyes:      General: No scleral icterus.     Pupils: Pupils are equal, round, and reactive to light.   Neck:      Vascular: No JVD.   Cardiovascular:      Rate and Rhythm: Normal rate and regular rhythm.      Heart sounds: No murmur heard.    No friction rub. No gallop.   Pulmonary:      Effort: Pulmonary effort is normal. No respiratory distress.      Breath sounds: Normal breath sounds. No wheezing or rales.   Abdominal:      General: Bowel sounds are normal. There is no distension.      Palpations: Abdomen is soft.      Tenderness: There is no abdominal tenderness. There is no guarding or rebound.      Comments: Ileostomy tube with normal output   Genitourinary:     Comments: Left nephrostomy tube with normal output   Musculoskeletal:         General: No deformity. Normal range of motion.      Cervical back: Neck supple.   Lymphadenopathy:      Cervical: No cervical adenopathy.   Skin:     General: Skin is warm and dry.       Capillary Refill: Capillary refill takes less than 2 seconds.      Findings: No erythema or rash.   Neurological:      Mental Status: She is alert and oriented to person, place, and time.      Cranial Nerves: No cranial nerve deficit.      Sensory: No sensory deficit.   Psychiatric:         Mood and Affect: Mood normal.           Significant Labs: A1C: No results for input(s): HGBA1C in the last 4320 hours.  Blood Culture:   No results for input(s): LABBLOO in the last 48 hours.    CBC:   Recent Labs   Lab 06/06/23 0417 06/07/23  0551   WBC 8.28 8.72   HGB 10.7* 10.9*   HCT 36.2* 35.0*    264       CMP:   Recent Labs   Lab 06/06/23 0417 06/07/23  0551    140   K 3.7 3.9   * 114*   CO2 22* 19*   GLU 74 86   BUN 17 18   CREATININE 1.3 1.3   CALCIUM 9.5 8.8   ANIONGAP 6* 7*       Recent Labs   Lab 06/07/23  1109   COLORU Yellow   APPEARANCEUA Hazy*   PHUR 7.0   SPECGRAV 1.010   PROTEINUA 1+*   GLUCUA Negative   KETONESU Negative   BILIRUBINUA Negative   OCCULTUA 2+*   NITRITE Negative   LEUKOCYTESUR 3+*   RBCUA 19*   WBCUA 43*   BACTERIA Moderate*   HYALINECASTS 2*         Microbiology Results (last 7 days)       Procedure Component Value Units Date/Time    Stool culture [[id number]] Collected: 06/05/23 1335    Order Status: Completed Specimen: Stool Updated: 06/07/23 1541     Stool Culture No Salmonella,Shigella,Vibrio,Campylobacter,Yersinia isolated.    Clostridium difficile EIA [[id number]] Collected: 06/05/23 1335    Order Status: Completed Specimen: Stool Updated: 06/05/23 2114     C. diff Antigen Negative     C difficile Toxins A+B, EIA Negative     Comment: Testing not recommended for children <24 months old.       E. coli 0157 antigen [[id number]] Collected: 06/05/23 1335    Order Status: Canceled Specimen: Stool     Blood culture x two cultures. Draw prior to antibiotics. [[id number]] Collected: 05/30/23 1845    Order Status: Completed Specimen: Blood from Peripheral, Wrist, Right Updated:  06/04/23 2022     Blood Culture, Routine No growth after 5 days.    Narrative:      Aerobic and anaerobic    Blood culture x two cultures. Draw prior to antibiotics. [[id number]] Collected: 05/30/23 1900    Order Status: Completed Specimen: Blood from Peripheral, Wrist, Left Updated: 06/04/23 2022     Blood Culture, Routine No growth after 5 days.    Narrative:      Aerobic and anaerobic    Urine culture [[id number]]  (Abnormal)  (Susceptibility) Collected: 05/30/23 2107    Order Status: Completed Specimen: Urine Updated: 06/01/23 2251     Urine Culture, Routine ENTEROBACTER CLOACAE  > 100,000 cfu/ml      Narrative:      Specimen Source->Urine              Significant Imaging: I have reviewed all pertinent imaging results/findings within the past 24 hours.      Assessment/Plan:      * UTI (urinary tract infection)  Bilateral flank pain  - Recurrent Urinary tact infection related to nephrostomy tube and chronic uretral strictures  - Follow up urine cultures and blood cultures  - Urine culture 4/10 growing klebsiella and enterococcus.  - followup urine cultures   - prn percocet and prn IV morphine   - ID consulted. apprec recs  -- urine cx grew Enterobacter   -- changed meropenem to ertapenem to complete 10 day course. Stop day for ertepenum is 6/9. May be discharged back to [facility]. Has midline.     Major depressive disorder, recurrent episode, moderate  Patient has persistent depression which is severe and is currently uncontrolled. Will Increase anti-depressant medications. We will consult psychiatry at this time. Patient does not display psychosis at this time. Continue to monitor closely and adjust plan of care as needed.  - patient's daughter committed suicide recently   - psychiatry and psychology following, apprec recs  - remeron 30 mg nightly   - SW obtaining medical records from [facility] in [place]    6/3 - per CM, the plan is to return to [facility]      Discharge planning  issues  - Given her h/o MDR UTI requiring IV ertapenem at this time, ureteral strictures s/p nephrostomy tube placement and need for repeat nephrostogram (scheduled for 6/6), need to wound care evaluation for colostomy care and refill of outpatient supplies and her grave disability due to MDD and severe bereavement (requiring PEC at this time), she continues to warrant  inpatient care to ensure she has a safe discharge to the appropriate psychiatric facility that will have the resources to care for her. She currently lacks the ability to care for herself at home given the complexity of her medical care and her current apathy and grade disability.  - C diff negative so isolation discontinued   - per psychiatry, patient's SI is improving and she may not require inpatient psychiatry admission upon discharge. However, patient doesn't have insurance coverage for SNF so she would be discharge home with HH. Patient's  concerned about patient managing her care at home   - will continue discussions with psychiatry and case management to determine safest setting for patient   - PT/OT ordered     Physical deconditioning  - patient notes decline in functional status since admission.   - PT/OT eval      Nephrostomy status  - S/p Bilateral Nephrostomy tubes; R. Removed after recent complication and remains with L.Nephrostomy  - Continue with Flomax 0.4 mg, daily  - urology following.   -- s/p nephrogram on 6/6/23 which showed continued ureteral obstruction. Exchange of a percutaneous 12 Fr nephrostomy tube without complication on 6/7/2023 by IR      Ileostomy in place  - S/p Transverse colon conduit 2020 complicate by bowel perforation requiring hemicolectomy & ileostomy  - increased watery stool from ileostomy on 6/5. Stool studies negative.   - started imodium prn  - Follow up outpatient CRS    ODESSA (acute kidney injury)  ODESSA on CKD  - Scr on admit, 1.7 --> 1.3   - Monitor renal function  - Avoid Nephrotoxic agents  -  Monitor urine output   - RESOVLED       VTE Risk Mitigation (From admission, onward)         Ordered     IP VTE HIGH RISK PATIENT  Once         05/30/23 2228     Place sequential compression device  Until discontinued         05/30/23 2228                Discharge Planning   OK: 6/8/2023     Code Status: Full Code   Is the patient medically ready for discharge?: No    Reason for patient still in hospital (select all that apply): Patient unstable, Patient trending condition, Laboratory test, Treatment, Consult recommendations, PT / OT recommendations and Pending disposition  Discharge Plan A: Psychiatric hospital   Discharge Delays: None known at this time        Time spent in care of patient: > 35 minutes         Dioni Brush MD  Department of Hospital Medicine   [facility]

## 2023-06-08 LAB
ANION GAP SERPL CALC-SCNC: 9 MMOL/L (ref 8–16)
BASOPHILS # BLD AUTO: 0.02 K/UL (ref 0–0.2)
BASOPHILS NFR BLD: 0.2 % (ref 0–1.9)
BUN SERPL-MCNC: 16 MG/DL (ref 6–20)
CALCIUM SERPL-MCNC: 9.1 MG/DL (ref 8.7–10.5)
CHLORIDE SERPL-SCNC: 112 MMOL/L (ref 95–110)
CO2 SERPL-SCNC: 17 MMOL/L (ref 23–29)
CREAT SERPL-MCNC: 1.1 MG/DL (ref 0.5–1.4)
DIFFERENTIAL METHOD: ABNORMAL
EOSINOPHIL # BLD AUTO: 0.2 K/UL (ref 0–0.5)
EOSINOPHIL NFR BLD: 2.6 % (ref 0–8)
ERYTHROCYTE [DISTWIDTH] IN BLOOD BY AUTOMATED COUNT: 15.3 % (ref 11.5–14.5)
EST. GFR  (NO RACE VARIABLE): 57.5 ML/MIN/1.73 M^2
GLUCOSE SERPL-MCNC: 87 MG/DL (ref 70–110)
HCT VFR BLD AUTO: 33.6 % (ref 37–48.5)
HGB BLD-MCNC: 10.3 G/DL (ref 12–16)
IMM GRANULOCYTES # BLD AUTO: 0.01 K/UL (ref 0–0.04)
IMM GRANULOCYTES NFR BLD AUTO: 0.1 % (ref 0–0.5)
LYMPHOCYTES # BLD AUTO: 1.4 K/UL (ref 1–4.8)
LYMPHOCYTES NFR BLD: 17.3 % (ref 18–48)
MCH RBC QN AUTO: 27.5 PG (ref 27–31)
MCHC RBC AUTO-ENTMCNC: 30.7 G/DL (ref 32–36)
MCV RBC AUTO: 90 FL (ref 82–98)
MONOCYTES # BLD AUTO: 1.3 K/UL (ref 0.3–1)
MONOCYTES NFR BLD: 15.8 % (ref 4–15)
NEUTROPHILS # BLD AUTO: 5.2 K/UL (ref 1.8–7.7)
NEUTROPHILS NFR BLD: 64 % (ref 38–73)
NRBC BLD-RTO: 0 /100 WBC
PLATELET # BLD AUTO: 250 K/UL (ref 150–450)
PMV BLD AUTO: 9.9 FL (ref 9.2–12.9)
POCT GLUCOSE: 102 MG/DL (ref 70–110)
POCT GLUCOSE: 114 MG/DL (ref 70–110)
POCT GLUCOSE: 253 MG/DL (ref 70–110)
POCT GLUCOSE: 89 MG/DL (ref 70–110)
POTASSIUM SERPL-SCNC: 3.9 MMOL/L (ref 3.5–5.1)
RBC # BLD AUTO: 3.75 M/UL (ref 4–5.4)
SODIUM SERPL-SCNC: 138 MMOL/L (ref 136–145)
WBC # BLD AUTO: 8.05 K/UL (ref 3.9–12.7)

## 2023-06-08 PROCEDURE — 25000003 PHARM REV CODE 250: Performed by: INTERNAL MEDICINE

## 2023-06-08 PROCEDURE — 36415 COLL VENOUS BLD VENIPUNCTURE: CPT | Performed by: STUDENT IN AN ORGANIZED HEALTH CARE EDUCATION/TRAINING PROGRAM

## 2023-06-08 PROCEDURE — 97161 PT EVAL LOW COMPLEX 20 MIN: CPT

## 2023-06-08 PROCEDURE — 63600175 PHARM REV CODE 636 W HCPCS: Performed by: INTERNAL MEDICINE

## 2023-06-08 PROCEDURE — 25000003 PHARM REV CODE 250: Performed by: STUDENT IN AN ORGANIZED HEALTH CARE EDUCATION/TRAINING PROGRAM

## 2023-06-08 PROCEDURE — 97165 OT EVAL LOW COMPLEX 30 MIN: CPT

## 2023-06-08 PROCEDURE — 85025 COMPLETE CBC W/AUTO DIFF WBC: CPT | Performed by: STUDENT IN AN ORGANIZED HEALTH CARE EDUCATION/TRAINING PROGRAM

## 2023-06-08 PROCEDURE — 11000001 HC ACUTE MED/SURG PRIVATE ROOM

## 2023-06-08 PROCEDURE — 99233 SBSQ HOSP IP/OBS HIGH 50: CPT | Mod: ,,, | Performed by: HOSPITALIST

## 2023-06-08 PROCEDURE — 97535 SELF CARE MNGMENT TRAINING: CPT

## 2023-06-08 PROCEDURE — 99233 PR SUBSEQUENT HOSPITAL CARE,LEVL III: ICD-10-PCS | Mod: ,,, | Performed by: HOSPITALIST

## 2023-06-08 PROCEDURE — 63600175 PHARM REV CODE 636 W HCPCS: Performed by: PHYSICIAN ASSISTANT

## 2023-06-08 PROCEDURE — 80048 BASIC METABOLIC PNL TOTAL CA: CPT | Performed by: STUDENT IN AN ORGANIZED HEALTH CARE EDUCATION/TRAINING PROGRAM

## 2023-06-08 PROCEDURE — 25000003 PHARM REV CODE 250: Performed by: PHYSICIAN ASSISTANT

## 2023-06-08 RX ADMIN — ERTAPENEM 1 G: 1 INJECTION INTRAMUSCULAR; INTRAVENOUS at 10:06

## 2023-06-08 RX ADMIN — MIRTAZAPINE 30 MG: 30 TABLET, FILM COATED ORAL at 09:06

## 2023-06-08 RX ADMIN — TAMSULOSIN HYDROCHLORIDE 0.4 MG: 0.4 CAPSULE ORAL at 09:06

## 2023-06-08 RX ADMIN — OXYCODONE HYDROCHLORIDE 10 MG: 10 TABLET ORAL at 09:06

## 2023-06-08 RX ADMIN — MORPHINE SULFATE 3 MG: 4 INJECTION INTRAVENOUS at 09:06

## 2023-06-08 RX ADMIN — GABAPENTIN 200 MG: 100 CAPSULE ORAL at 09:06

## 2023-06-08 NOTE — PLAN OF CARE
Problem: Physical Therapy  Goal: Physical Therapy Goal  Description: Goals to be met by: 23     Patient will increase functional independence with mobility by performin. Supine to sit with Modified [place]  2. Sit to stand transfer with Modified [place] with RW if needed.   3. Gait  x 100 feet with Stand-by Assistance using Rolling Walker.   4. Ascend/descend 3 stair with right Handrails Contact Guard Assistance.     Outcome: Ongoing, Progressing   Evaluation completed and goals appropriate. 2023

## 2023-06-08 NOTE — PT/OT/SLP EVAL
Physical Therapy  Co-Evaluation and treatment with OT    Patient Name:  Edita Riley   MRN:  [id number]    Recommendations:     Discharge Recommendations: home   Discharge Equipment Recommendations: none   Barriers to discharge: None    Assessment:     Edita Riley is a 58 y.o. female admitted with a medical diagnosis of UTI (urinary tract infection).  She presents with the following impairments/functional limitations: impaired endurance, impaired functional mobility, gait instability, impaired balance, decreased safety awareness pt tolerated treatment well and will benefit from skilled PT 3x/wk to progress physically. Pt should be able to discharge home with no needs when medically stable.     Rehab Prognosis: Good; patient would benefit from acute skilled PT services to address these deficits and reach maximum level of function.    Recent Surgery: Procedure(s) (LRB):  Nephrostogram - antegrade (Left)  LOOPOGRAM (N/A) 2 Days Post-Op    Plan:     During this hospitalization, patient to be seen 3 x/week to address the identified rehab impairments via gait training, therapeutic activities and progress toward the following goals:    Plan of Care Expires:  07/06/23    Subjective     Chief Complaint: pt stated that she felt better after brushing her teeth and washing her face.   Patient/Family Comments/goals: to go home.   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   Prior to admission, patients level of function was modified Independent with RW. .  Equipment used at home: walker, rolling, wheelchair.  DME owned (not currently used): none.  Upon discharge, patient will have assistance from . .    Objective:     Communicated with nurse prior to  session.  Patient found supine with  (hep lock IV, nephrostomy tube)  upon PT entry to room.    General Precautions: Standard, fall PEC  Orthopedic Precautions:    Braces:    Respiratory Status: Room air    Exams:  Cognitive Exam:  Patient is oriented to Person, Place, Time, and Situation  RLE ROM: WFL  RLE Strength: WFL  LLE ROM: WFL  LLE Strength: WFL    Functional Mobility:  Bed Mobility:  pt needed verbal cues for hand placement and sequencing for functional mobility.    Rolling Left:  stand by assistance  Supine to Sit: stand by assistance    Transfers:     Sit to Stand:  contact guard assistance with rolling walker    Gait: pt received gait training ~ 32 ft with RW and SBA.     Balance: pt was SBA standing balance at sink to perform ADLS with OT.     PT concentrated on BLE MMT, ROM and gait training with evaluation.    Pt white board updated with current therapists name and level of mobility assistance needed.     AM-PAC 6 CLICK MOBILITY  Total Score:18       Treatment & Education:  Pt received verbal instructions in role of PT and POC. Pt verbally expressed understanding of such.     Patient left up in chair with all lines intact, call button in reach, and paid sitter present.    GOALS:   Multidisciplinary Problems       Physical Therapy Goals          Problem: Physical Therapy    Goal Priority Disciplines Outcome Goal Variances Interventions   Physical Therapy Goal     PT, PT/OT Ongoing, Progressing     Description: Goals to be met by: 23     Patient will increase functional independence with mobility by performin. Supine to sit with Modified [place]  2. Sit to stand transfer with Modified [place] with RW if needed.   3. Gait  x 100 feet with Stand-by Assistance using Rolling Walker.   4. Ascend/descend 3 stair with right Handrails Contact Guard Assistance.                          History:     Past Medical History:   Diagnosis Date    Abnormal mammogram 2020    Acute blood loss  anemia     Acute deep vein thrombosis (DVT) of lower extremity 12/09/2020    Advance care planning 04/30/2021    Anemia due to chronic blood loss     Anemia due to chronic kidney disease     Anxiety     Bilateral ureteral obstruction 9/11/2020    Cardiovascular event risk -- low 09/14/2015    ASCVD 10-year risk 1.9% (with optimal risk factors 1.3%) as of 9/14/2015     Cervical cancer 2014    Chronic back pain     Colostomy care     Deep vein thrombosis     Depression     Diarrhea due to malabsorption 11/14/2018    Difficult intubation     Disorder of kidney and ureter     DVT of lower extremity, bilateral 11/04/2020    Emphysema of lung 4/10/2023    Fibromyalgia     Fungemia 09/27/2020    Generalized abdominal pain 08/25/2020    GERD (gastroesophageal reflux disease)     Hemifacial spasm 09/16/2015    Hiatal hernia 2014    History of cervical cancer 10/11/2018    Hx of psychiatric care     Cymbalta, trazodone    Hypertension     Hypomagnesemia 11/21/2018    Lactose intolerance     Metastatic squamous cell carcinoma to lymph node 10/11/2018    Neuropathy due to chemotherapeutic drug 11/14/2018    Osteoarthritis of back     Peritonitis 09/22/2020    Pseudomonas urinary tract infection 04/21/2021    Psychiatric problem     Refusal of blood transfusions as patient is Protestant     Schatzki's ring 09/14/2015    Seen on outside EGD 05/2014, underwent esophageal dilatation. Bx were negative.     Seizures     Sleep stage dysfunction     Noted on PSG 06/2017; negative for obstructive sleep apnea     Stroke     Urinary tract infection associated with nephrostomy catheter 06/11/2020    Wound infection after surgery 09/24/2020       Past Surgical History:   Procedure Laterality Date    ANTEGRADE NEPHROSTOGRAPHY Bilateral 9/28/2020    Procedure: Nephrostogram - antegrade;  Surgeon: Leslie Balbuena MD;  Location: [facility] OR [address] [place] [address];  Service: Urology;  Laterality: Bilateral;    ANTEGRADE NEPHROSTOGRAPHY Left 4/20/2021     Procedure: NEPHROSTOGRAM, ANTEGRADE;  Surgeon: Leslie Balbuena MD;  Location: [facility] OR 1ST FLR;  Service: Urology;  Laterality: Left;    ANTEGRADE NEPHROSTOGRAPHY Right 10/27/2022    Procedure: NEPHROSTOGRAM, ANTEGRADE;  Surgeon: Chirag Russ MD;  Location: [facility] OR 1ST FLR;  Service: Urology;  Laterality: Right;    ANTEGRADE NEPHROSTOGRAPHY Right 4/21/2023    Procedure: NEPHROSTOGRAM, ANTEGRADE;  Surgeon: Chirag Russ MD;  Location: [facility] OR 2ND FLR;  Service: Urology;  Laterality: Right;    ANTEGRADE NEPHROSTOGRAPHY Left 6/6/2023    Procedure: Nephrostogram - antegrade;  Surgeon: Leslie Balbuena MD;  Location: [facility] OR 1ST FLR;  Service: Urology;  Laterality: Left;    BILATERAL OOPHORECTOMY  2015    CHOLECYSTECTOMY  11/09/2016    Done at [facility], path showed chronic cholecystitis and gallstones    cold knife conization  2014    COLECTOMY, RIGHT  9/17/2020    Procedure: COLECTOMY, RIGHT Extended;  Surgeon: Hammad Reynolds MD;  Location: [facility] OR 2ND FLR;  Service: Colon and Rectal;;    COLONOSCOPY  2014    [facility]    COLONOSCOPY N/A 5/18/2018    Procedure: COLONOSCOPY;  Surgeon: Arden Gutiérrez MD;  Location: [facility] (4TH FLR);  Service: Endoscopy;  Laterality: N/A;    COLONOSCOPY N/A 7/28/2020    Procedure: COLONOSCOPY;  Surgeon: Hammad Reynolds MD;  Location: [facility] ENDO (4TH FLR);  Service: Colon and Rectal;  Laterality: N/A;  covid test [place] 7/25    CYSTOSCOPY WITH URETEROSCOPY, RETROGRADE PYELOGRAPHY, AND INSERTION OF STENT Bilateral 3/21/2020    Procedure: CYSTOSCOPY, WITH RETROGRADE PYELOGRAM,;  Surgeon: Leslie Balbuena MD;  Location: [facility] OR 1ST FLR;  Service: Urology;  Laterality: Bilateral;    DILATION OF NEPHROSTOMY TRACT Right 10/27/2022    Procedure: DILATION, NEPHROSTOMY TRACT;  Surgeon: Chirag Russ MD;  Location: [facility] OR [facility] F[facility];  Service: Urology;  Laterality: Right;    ESOPHAGOGASTRODUODENOSCOPY  2014    ESOPHAGOGASTRODUODENOSCOPY   11/18/2020    ESOPHAGOGASTRODUODENOSCOPY N/A 11/18/2020    Procedure: ESOPHAGOGASTRODUODENOSCOPY (EGD);  Surgeon: Zenon Spencer MD;  Location: [facility] ENDO;  Service: Endoscopy;  Laterality: N/A;    ESOPHAGOGASTRODUODENOSCOPY N/A 12/11/2020    Procedure: EGD (ESOPHAGOGASTRODUODENOSCOPY);  Surgeon: Juancho Muse MD;  Location: [facility] (2ND FLR);  Service: Endoscopy;  Laterality: N/A;    HYSTERECTOMY  2014    [facility] for cervical cancer    ILEOSTOMY  9/17/2020    Procedure: CREATION, ILEOSTOMY;  Surgeon: Hammad Reynolds MD;  [facility] OR 2ND FLR;  Service: Colon and Rectal;;    LOOPOGRAM N/A 4/20/2021    Procedure: LOOPOGRAM;  Surgeon: Leslie Balbuena MD;  [facility] OR 1ST FLR;  Service: Urology;  Laterality: N/A;    LOOPOGRAM N/A 6/6/2023    Procedure: LOOPOGRAM;  Surgeon: Leslie Balbuena MD;  [facility] OR 1ST FLR;  Service: Urology;  Laterality: N/A;    MOBILIZATION OF SPLENIC FLEXURE N/A 9/11/2020    Procedure: MOBILIZATION, COLONIC;  Surgeon: Hammad Reynolds MD;  Location: [facility] OR 2ND FLR;  Service: Colon and Rectal;  Laterality: N/A;    NEPHROSTOGRAPHY Bilateral 4/17/2021    Procedure: Nephrostogram;  Surgeon: Celeste Surgeon;  Location: [facility];  Service: Anesthesiology;  Laterality: Bilateral;    OOPHORECTOMY      PERCUTANEOUS NEPHROLITHOTOMY Right 4/21/2023    Procedure: NEPHROLITHOTOMY, PERCUTANEOUS;  Surgeon: Chirag Russ MD;  Location: [facility] OR 2ND FLR;  Service: Urology;  Laterality: Right;  2.5 hrs    PERCUTANEOUS NEPHROSTOMY  4/21/2023    Procedure: CREATION, NEPHROSTOMY, PERCUTANEOUS with removal of existing nephrostomy tube;  Surgeon: Chirga Russ MD;  Location: [facility] OR 2ND FLR;  Service: Urology;;    PYELOSCOPY Right 10/27/2022    Procedure: PYELOSCOPY;  Surgeon: Chirag Russ MD;  [facility] 1ST FLR;  Service: Urology;  Laterality: Right;    REPLACEMENT OF NEPHROSTOMY TUBE Right 10/27/2022    Procedure: REPLACEMENT, NEPHROSTOMY TUBE;  Surgeon: Chirag MICHELLE  MD Jeb;  Location: [facility] OR 1ST FLR;  Service: Urology;  Laterality: Right;    RETROPERITONEAL LYMPHADENECTOMY Right 9/17/2018    Procedure: LYMPHADENECTOMY, RETROPERITONEUM;  Surgeon: Sebas Reed MD;  Location: [facility] OR 2ND FLR;  Service: General;  Laterality: Right;  ILIAC    URETEROSCOPIC REMOVAL OF URETERIC CALCULUS Right 10/27/2022    Procedure: REMOVAL, CALCULUS, URETER, URETEROSCOPIC;  Surgeon: Chirag Russ MD;  Location: [facility] OR [address] [place] [address];  Service: Urology;  Laterality: Right;    URETEROSCOPY Right 10/27/2022    Procedure: URETEROSCOPY-ANTEGRADE;  Surgeon: Chirag Russ MD;  Location: [facility] OR [address] [place] [address];  Service: Urology;  Laterality: Right;       Time Tracking:     PT Received On: 06/08/23  PT Start Time: 0950     PT Stop Time: 1003  PT Total Time (min): 13 min     Billable Minutes: Evaluation 13 min       06/08/2023

## 2023-06-08 NOTE — PT/OT/SLP EVAL
Occupational Therapy   Evaluation    Name: Edita Riley  MRN: [id number]  Admitting Diagnosis: UTI (urinary tract infection)  Recent Surgery: Procedure(s) (LRB):  Nephrostogram - antegrade (Left)  LOOPOGRAM (N/A) 2 Days Post-Op    Recommendations:     Discharge Recommendations: home  Discharge Equipment Recommendations:  none  Barriers to discharge:  None    Assessment:     Edita Riley is a 58 y.o. female with a medical diagnosis of UTI (urinary tract infection).  She presents with the following performance deficits affecting function: impaired endurance, impaired functional mobility, impaired balance, gait instability, decreased safety awareness, decreased lower extremity function.      Rehab Prognosis: Good; patient would benefit from acute skilled OT services to address these deficits and reach maximum level of function.       Plan:     Patient to be seen 3 x/week to address the above listed problems via self-care/home management, therapeutic activities, therapeutic exercises  Plan of Care Expires: 06/22/23  Plan of Care Reviewed with: patient    Subjective     Chief Complaint: Eager to get up  Patient/Family Comments/goals: Return home    Occupational Profile:  Living Environment: Pt lives with her , 1st floor apartment, 3STE, R HR  Previous level of function: Mod I with rolling walker  Roles and Routines:   Equipment Used at Home: wheelchair, walker, rolling  Assistance upon Discharge:     Pain/Comfort:  Pain Rating 1: 0/10  Pain Rating Post-Intervention 1: 0/10    Patients cultural, spiritual, Spiritism conflicts given the current situation:      Objective:     Communicated with: Nursing prior to session.  Patient found supine with Other (comments) (hep lock, nephrostomy) upon OT entry to room.    General Precautions: Standard, fall  Orthopedic Precautions: N/A  Braces: N/A  Respiratory Status: Room air    Occupational Performance:    Bed Mobility:    Patient  completed Rolling/Turning to Left with  stand by assistance  Patient completed Scooting/Bridging with stand by assistance  Patient completed Supine to Sit with stand by assistance    Functional Mobility/Transfers:  Patient completed Sit <> Stand Transfer with contact guard assistance  with  rolling walker   Functional Mobility: SBA with rolling walker for 32 ft within room and into bathroom    Activities of Daily Living:  Grooming: stand by assistance standing at sink with rolling walker  Upper Body Dressing: stand by assistance seated edge of bed   Lower Body Dressing: stand by assistance seated edge of bed     Cognitive/Visual Perceptual:  Cognitive/Psychosocial Skills:     -       Oriented to: Person, Place, Time, and Situation   -       Follows Commands/attention:Follows multistep  commands  -       Safety awareness/insight to disability: intact   -       Mood/Affect/Coping skills/emotional control: Appropriate to situation    Physical Exam:  Postural examination/scapula alignment:    -       No postural abnormalities identified  Upper Extremity Range of Motion:     -       Right Upper Extremity: WFL  -       Left Upper Extremity: WFL  Upper Extremity Strength:    -       Right Upper Extremity: WFL  -       Left Upper Extremity: WFL   Strength:    -       Right Upper Extremity: WFL  -       Left Upper Extremity: WFL    AMPAC 6 Click ADL:  AMPAC Total Score: 21    Treatment & Education:  -Evaluation completed, plan of care established.  -Patient and family educated on roles/goals of OT and POC.  -White board updated.  -Therapist provided time for questions and answered within scope of practice.  -Patient educated on importance of EOB/OOB activity to maximize recovery.     Patient left up in chair with all lines intact and call button in reach    GOALS:   Multidisciplinary Problems       Occupational Therapy Goals       Not on file                    History:     Past Medical History:   Diagnosis Date     Abnormal mammogram 08/25/2020    Acute blood loss anemia     Acute deep vein thrombosis (DVT) of lower extremity 12/09/2020    Advance care planning 04/30/2021    Anemia due to chronic blood loss     Anemia due to chronic kidney disease     Anxiety     Bilateral ureteral obstruction 9/11/2020    Cardiovascular event risk -- low 09/14/2015    ASCVD 10-year risk 1.9% (with optimal risk factors 1.3%) as of 9/14/2015     Cervical cancer 2014    Chronic back pain     Colostomy care     Deep vein thrombosis     Depression     Diarrhea due to malabsorption 11/14/2018    Difficult intubation     Disorder of kidney and ureter     DVT of lower extremity, bilateral 11/04/2020    Emphysema of lung 4/10/2023    Fibromyalgia     Fungemia 09/27/2020    Generalized abdominal pain 08/25/2020    GERD (gastroesophageal reflux disease)     Hemifacial spasm 09/16/2015    Hiatal hernia 2014    History of cervical cancer 10/11/2018    Hx of psychiatric care     Cymbalta, trazodone    Hypertension     Hypomagnesemia 11/21/2018    Lactose intolerance     Metastatic squamous cell carcinoma to lymph node 10/11/2018    Neuropathy due to chemotherapeutic drug 11/14/2018    Osteoarthritis of back     Peritonitis 09/22/2020    Pseudomonas urinary tract infection 04/21/2021    Psychiatric problem     Refusal of blood transfusions as patient is Yarsanism     Schatzki's ring 09/14/2015    Seen on outside EGD 05/2014, underwent esophageal dilatation. Bx were negative.     Seizures     Sleep stage dysfunction     Noted on PSG 06/2017; negative for obstructive sleep apnea     Stroke     Urinary tract infection associated with nephrostomy catheter 06/11/2020    Wound infection after surgery 09/24/2020         Past Surgical History:   Procedure Laterality Date    ANTEGRADE NEPHROSTOGRAPHY Bilateral 9/28/2020    Procedure: Nephrostogram - antegrade;  Surgeon: Leslie Balbuena MD;  Location: [facility] OR [address] [place] [address];  Service: Urology;  Laterality:  Bilateral;    ANTEGRADE NEPHROSTOGRAPHY Left 4/20/2021    Procedure: NEPHROSTOGRAM, ANTEGRADE;  Surgeon: Leslie Balbuena MD;  Location: [facility] OR 1ST FLR;  Service: Urology;  Laterality: Left;    ANTEGRADE NEPHROSTOGRAPHY Right 10/27/2022    Procedure: NEPHROSTOGRAM, ANTEGRADE;  Surgeon: Chirag Russ MD;  Location: [facility] OR 1ST FLR;  Service: Urology;  Laterality: Right;    ANTEGRADE NEPHROSTOGRAPHY Right 4/21/2023    Procedure: NEPHROSTOGRAM, ANTEGRADE;  Surgeon: Chirag Russ MD;  Location: [facility] OR 2ND FLR;  Service: Urology;  Laterality: Right;    ANTEGRADE NEPHROSTOGRAPHY Left 6/6/2023    Procedure: Nephrostogram - antegrade;  Surgeon: Leslie Balbuena MD;  Location: [facility] OR 1ST FLR;  Service: Urology;  Laterality: Left;    BILATERAL OOPHORECTOMY  2015    CHOLECYSTECTOMY  11/09/2016    Done at [facility], path showed chronic cholecystitis and gallstones    cold knife conization  2014    COLECTOMY, RIGHT  9/17/2020    Procedure: COLECTOMY, RIGHT Extended;  Surgeon: Hammad Reynolds MD;  Location: [facility] OR 2ND FLR;  Service: Colon and Rectal;;    COLONOSCOPY  2014    [facility]OSCOPY N/A [facility]24[facility]2016    [facility]    COLONOSCOPY N/A 5/18/2018    Procedure: COLONOSCOPY;  Surgeon: Arden Gutiérrez MD;  Location: [facility] (4TH FLR);  Service: Endoscopy;  Laterality: N/A;    COLONOSCOPY N/A 7/28/2020    Procedure: COLONOSCOPY;  Surgeon: Hammad Reynolds MD;  Location: [facility] ENDO (4TH FLR);  Service: Colon and Rectal;  Laterality: N/A;  covid test [place] 7/25    CYSTOSCOPY WITH URETEROSCOPY, RETROGRADE PYELOGRAPHY, AND INSERTION OF STENT Bilateral 3/21/2020    Procedure: CYSTOSCOPY, WITH RETROGRADE PYELOGRAM,;  Surgeon: Leslie Balbuena MD;  Location: [facility] OR 1ST FLR;  Service: Urology;  Laterality: Bilateral;    DILATION OF NEPHROSTOMY TRACT Right 10/27/2022    Procedure: DILATION, NEPHROSTOMY TRACT;  Surgeon: Chirag Russ MD;  Location: [facility] OR 1ST FLR;  Service: Urology;  Laterality: Right;     ESOPHAGOGASTRODUODENOSCOPY  2014    ESOPHAGOGASTRODUODENOSCOPY  11/18/2020    ESOPHAGOGASTRODUODENOSCOPY N/A 11/18/2020    Procedure: ESOPHAGOGASTRODUODENOSCOPY (EGD);  Surgeon: Zenon Spencer MD;  Location: [facility] ENDO;  Service: Endoscopy;  Laterality: N/A;    ESOPHAGOGASTRODUODENOSCOPY N/A 12/11/2020    Procedure: EGD (ESOPHAGOGASTRODUODENOSCOPY);  Surgeon: Juancho Muse MD;  Location: [facility] (2ND FLR);  Service: Endoscopy;  Laterality: N/A;    HYSTERECTOMY  2014    [facility] for cervical cancer    ILEOSTOMY  9/17/2020    Procedure: CREATION, ILEOSTOMY;  Surgeon: Hammad Reynolds MD;  [facility] OR 2ND FLR;  Service: Colon and Rectal;;    LOOPOGRAM N/A 4/20/2021    Procedure: LOOPOGRAM;  Surgeon: Leslie Balbuena MD;  [facility] OR 1ST FLR;  Service: Urology;  Laterality: N/A;    LOOPOGRAM N/A 6/6/2023    Procedure: LOOPOGRAM;  Surgeon: Leslie Balbuena MD;  [facility] OR 1ST FLR;  Service: Urology;  Laterality: N/A;    MOBILIZATION OF SPLENIC FLEXURE N/A 9/11/2020    Procedure: MOBILIZATION, COLONIC;  Surgeon: Hammad Reynolds MD;  Location: [facility] OR 2ND FLR;  Service: Colon and Rectal;  Laterality: N/A;    NEPHROSTOGRAPHY Bilateral 4/17/2021    Procedure: Nephrostogram;  Surgeon: Celeste Surgeon;  Location: [facility];  Service: Anesthesiology;  Laterality: Bilateral;    OOPHORECTOMY      PERCUTANEOUS NEPHROLITHOTOMY Right 4/21/2023    Procedure: NEPHROLITHOTOMY, PERCUTANEOUS;  Surgeon: Chirag Russ MD;  Location: [facility] OR 2ND FLR;  Service: Urology;  Laterality: Right;  2.5 hrs    PERCUTANEOUS NEPHROSTOMY  4/21/2023    Procedure: CREATION, NEPHROSTOMY, PERCUTANEOUS with removal of existing nephrostomy tube;  Surgeon: Chirag Russ MD;  Location: [facility] OR 2ND FLR;  Service: Urology;;    PYELOSCOPY Right 10/27/2022    Procedure: PYELOSCOPY;  Surgeon: Chirag Russ MD;  Location: [facility] OR [address] [place] [address];  Service: Urology;  Laterality: Right;    REPLACEMENT OF NEPHROSTOMY TUBE Right 10/27/2022     Procedure: REPLACEMENT, NEPHROSTOMY TUBE;  Surgeon: Chirag Russ MD;  Location: [facility] OR [address] [place] [address];  Service: Urology;  Laterality: Right;    RETROPERITONEAL LYMPHADENECTOMY Right 9/17/2018    Procedure: LYMPHADENECTOMY, RETROPERITONEUM;  Surgeon: Sebas Reed MD;  Location: [facility] OR 2ND FLR;  Service: General;  Laterality: Right;  ILIAC    URETEROSCOPIC REMOVAL OF URETERIC CALCULUS Right 10/27/2022    Procedure: REMOVAL, CALCULUS, URETER, URETEROSCOPIC;  Surgeon: Chirag Russ MD;  Location: [facility] OR [address] [place] [address];  Service: Urology;  Laterality: Right;    URETEROSCOPY Right 10/27/2022    Procedure: URETEROSCOPY-ANTEGRADE;  Surgeon: Chirag Russ MD;  Location: [facility] OR [address] [place] [address];  Service: Urology;  Laterality: Right;       Time Tracking:     OT Date of Treatment: 06/08/23  OT Start Time: 0948  OT Stop Time: 1004  OT Total Time (min): 16 min    Billable Minutes:Evaluation 8  Self Care/Home Management 8    6/8/2023

## 2023-06-08 NOTE — ASSESSMENT & PLAN NOTE
Bilateral flank pain  - Recurrent Urinary tact infection related to nephrostomy tube and chronic uretral strictures  - Follow up urine cultures and blood cultures  - Urine culture 4/10 growing klebsiella and enterococcus.  - followup urine cultures   - prn percocet and prn IV morphine   - ID consulted. apprec recs  -- urine cx grew Enterobacter   -- changed meropenem to ertapenem to complete 10 day course. Stop day for ertepenum is 6/9. May be discharged back to [facility]. Has midline.     6/8- stable on ertepenem

## 2023-06-08 NOTE — PROGRESS NOTES
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 Patient was discharged last month after presenting with recurrent UTI (E. Faecium VRE). History limited form patient. She had undergone right PCN drain removal and stone extraction with drain replacement. Completed a course of Ertapenem 1 g IV daily for 10 days and Linezolid and was discharged on Bactrim after ID evaluation. Patient denies any fever, chills, nausea, vomiting, hematemesis, cough, chest pain, palpitations, shortness of breath, abdominal pain/distension, changes in bowel or urinary habits, no hematochezia or melena.      Overview/Hospital Course:  Per urology, given good renal function, no indication for inpatient placement of right nephrostomy tube for the moderate right hyrdrouteronephrosis. Per ID, switched to ertapenem based on urine culture which grew Enterobacter and will complete 10 day total course. Midline consult placed. Psychiatry following and remeron increased to 30 mg nightly. Psychology following and SW obtaining records from [facility] in [place] for med list.    6/4-  ertapenem, EOC 6/9/23.  VSS.  Midline placed. She is Med Ready for discharge. PEC and CEC in place. CM working with [facility].    6/5- Tolerating IV ertapenem well but does note more liquid stool from colostomy. Stool studies pending. She is on Remeron 30 mg nightly and remains PEC'd given her grade disability in managing her own care given her  depression and severe bereavement. Given her h/o MDR UTI requiring IV ertapenem at this time, ureteral strictures s/p nephrostomy tube placement and need for repeat nephrostogram (scheduled for 6/6), need to wound care evaluation for colostomy care and refill of outpatient supplies and her grave disability due to MDD and severe bereavement, she requires continued inpatient care to ensure she has a safe discharge to the appropriate psychiatric facility that will have the resources to care for her. She currently lacks the ability to care for herself at home given the complexity of her medical care and her current apathy and grade disability. Arranging for psychiatry facility placement after nephrostogram and stool study results.     6/7- Patient sitting in bed, no acute distress. No acute events overnight. Patient reports left flank pain better controlled. Tolerating IV ertapenem well and will be completed on 6/9/2023. Remains PEC'd for grave disability due to SI 2/2 depression and grief but per psychiatry, suicidal ideations are improving. Left antegrade nephrogram demonstrated continued ureteral obstruction so she underwent exchange of a percutaneous 12 Fr nephrostomy tube today. PT/OT ordered due to functional decline during this hospitalization.     6/8- Nephrostomy tubes pulled per Urology. No back pain. Ostomy working well.  /56. Completes ertapenem tomorrow on 6/9. CM working on dc plan. Will likely need to go home.  is considering LTC- NH in the future.         Interval History: see above      Review of Systems   Constitutional:  Positive for activity change and fatigue. Negative for chills and fever.   HENT:  Negative for congestion and trouble swallowing.    Eyes:  Negative for visual disturbance.   Respiratory:  Negative for cough and shortness of breath.    Cardiovascular:  Negative for chest pain and leg swelling.   Gastrointestinal:  Negative for abdominal distention, abdominal pain, nausea  and vomiting.   Endocrine: Negative for cold intolerance, heat intolerance, polydipsia and polyuria.   Genitourinary:  Positive for flank pain. Negative for difficulty urinating and dysuria.   Musculoskeletal:  Negative for back pain and myalgias.   Skin:  Negative for rash and wound.   Neurological:  Positive for weakness. Negative for dizziness and light-headedness.   Hematological:  Negative for adenopathy. Does not bruise/bleed easily.   Psychiatric/Behavioral:  Negative for confusion and sleep disturbance.    Objective:     Vital Signs (Most Recent):  Temp: 99.1 °F (37.3 °C) (06/08/23 0605)  Pulse: 86 (06/08/23 0605)  Resp: 18 (06/08/23 0605)  BP: (!) 102/56 (06/08/23 0605)  SpO2: 95 % (06/08/23 0605) Vital Signs (24h Range):  Temp:  [97.1 °F (36.2 °C)-99.7 °F (37.6 °C)] 99.1 °F (37.3 °C)  Pulse:  [74-87] 86  Resp:  [11-20] 18  SpO2:  [95 %-100 %] 95 %  BP: ()/(50-62) 102/56     Weight: 83 kg (183 lb)  Body mass index is 27.02 kg/m².    Intake/Output Summary (Last 24 hours) at 6/8/2023 0810  Last data filed at 6/8/2023 0634  Gross per 24 hour   Intake 60 ml   Output 1495 ml   Net -1435 ml           Physical Exam  Constitutional:       Appearance: She is well-developed.   HENT:      Head: Normocephalic and atraumatic.   Eyes:      General: No scleral icterus.     Pupils: Pupils are equal, round, and reactive to light.   Neck:      Vascular: No JVD.   Cardiovascular:      Rate and Rhythm: Normal rate and regular rhythm.      Heart sounds: No murmur heard.    No friction rub. No gallop.   Pulmonary:      Effort: Pulmonary effort is normal. No respiratory distress.      Breath sounds: Normal breath sounds. No wheezing or rales.   Abdominal:      General: Bowel sounds are normal. There is no distension.      Palpations: Abdomen is soft.      Tenderness: There is no abdominal tenderness. There is no guarding or rebound.      Comments: Ileostomy tube with normal output   Genitourinary:     Comments: Left  nephrostomy tube with normal output   Musculoskeletal:         General: No deformity. Normal range of motion.      Cervical back: Neck supple.   Lymphadenopathy:      Cervical: No cervical adenopathy.   Skin:     General: Skin is warm and dry.      Capillary Refill: Capillary refill takes less than 2 seconds.      Findings: No erythema or rash.   Neurological:      Mental Status: She is alert and oriented to person, place, and time.      Cranial Nerves: No cranial nerve deficit.      Sensory: No sensory deficit.   Psychiatric:         Mood and Affect: Mood normal.         CBC:   Recent Labs   Lab 06/07/23  0551 06/08/23  0408   WBC 8.72 8.05   HGB 10.9* 10.3*   HCT 35.0* 33.6*    250       CMP:   Recent Labs   Lab 06/07/23  0551 06/08/23  0409    138   K 3.9 3.9   * 112*   CO2 19* 17*   GLU 86 87   BUN 18 16   CREATININE 1.3 1.1   CALCIUM 8.8 9.1   ANIONGAP 7* 9       Recent Labs   Lab 06/07/23  1109   COLORU Yellow   APPEARANCEUA Hazy*   PHUR 7.0   SPECGRAV 1.010   PROTEINUA 1+*   GLUCUA Negative   KETONESU Negative   BILIRUBINUA Negative   OCCULTUA 2+*   NITRITE Negative   LEUKOCYTESUR 3+*   RBCUA 19*   WBCUA 43*   BACTERIA Moderate*   HYALINECASTS 2*         Microbiology Results (last 7 days)       Procedure Component Value Units Date/Time    Stool culture [[id number]] Collected: 06/05/23 1335    Order Status: Completed Specimen: Stool Updated: 06/07/23 1541     Stool Culture No Salmonella,Shigella,Vibrio,Campylobacter,Yersinia isolated.    Clostridium difficile EIA [[id number]] Collected: 06/05/23 1335    Order Status: Completed Specimen: Stool Updated: 06/05/23 2114     C. diff Antigen Negative     C difficile Toxins A+B, EIA Negative     Comment: Testing not recommended for children <24 months old.       E. coli 0157 antigen [[id number]] Collected: 06/05/23 1335    Order Status: Canceled Specimen: Stool     Blood culture x two cultures. Draw prior to antibiotics. [[id number]] Collected: 05/30/23  1845    Order Status: Completed Specimen: Blood from Peripheral, Wrist, Right Updated: 06/04/23 2022     Blood Culture, Routine No growth after 5 days.    Narrative:      Aerobic and anaerobic    Blood culture x two cultures. Draw prior to antibiotics. [[id number]] Collected: 05/30/23 1900    Order Status: Completed Specimen: Blood from Peripheral, Wrist, Left Updated: 06/04/23 2022     Blood Culture, Routine No growth after 5 days.    Narrative:      Aerobic and anaerobic    Urine culture [[id number]]  (Abnormal)  (Susceptibility) Collected: 05/30/23 2107    Order Status: Completed Specimen: Urine Updated: 06/01/23 2251     Urine Culture, Routine ENTEROBACTER CLOACAE  > 100,000 cfu/ml      Narrative:      Specimen Source->Urine              Significant Imaging: I have reviewed all pertinent imaging results/findings within the past 24 hours.      Assessment/Plan:      * UTI (urinary tract infection)  Bilateral flank pain  - Recurrent Urinary tact infection related to nephrostomy tube and chronic uretral strictures  - Follow up urine cultures and blood cultures  - Urine culture 4/10 growing klebsiella and enterococcus.  - followup urine cultures   - prn percocet and prn IV morphine   - ID consulted. apprec recs  -- urine cx grew Enterobacter   -- changed meropenem to ertapenem to complete 10 day course. Stop day for ertepenum is 6/9. May be discharged back to [facility]. Has midline.     6/8- stable on ertepenem    ODESSA (acute kidney injury)  ODESSA on CKD  - Scr on admit, 1.7 --> 1.3   - Monitor renal function  - Avoid Nephrotoxic agents  - Monitor urine output   - RESOVLED     Ileostomy in place  - S/p Transverse colon conduit 2020 complicate by bowel perforation requiring hemicolectomy & ileostomy  - increased watery stool from ileostomy on 6/5. Stool studies negative.   - started imodium prn  - Follow up outpatient CRS    Nephrostomy status  - S/p Bilateral Nephrostomy tubes; R. Removed after recent  complication and remains with L.Nephrostomy  - Continue with Flomax 0.4 mg, daily  - urology following.   -- s/p nephrogram on 6/6/23 which showed continued ureteral obstruction. Exchange of a percutaneous 12 Fr nephrostomy tube without complication on 6/7/2023 by [facility]AS[place]Y      Major depressive disorder, recurrent episode, moderate  Patient has persistent depression which is severe and is currently uncontrolled. Will Increase anti-depressant medications. We will consult psychiatry at this time. Patient does not display psychosis at this time. Continue to monitor closely and adjust plan of care as needed.  - patient's daughter committed suicide recently   - psychiatry and psychology following, apprec recs  - remeron 30 mg nightly   - SW obtaining medical records from [facility] in [place]    6/3 - per , the plan is to return to [facility]      Discharge planning issues  - Given her h/o MDR UTI requiring IV ertapenem at this time, ureteral strictures s/p nephrostomy tube placement and need for repeat nephrostogram (scheduled for 6/6), need to wound care evaluation for colostomy care and refill of outpatient supplies and her grave disability due to MDD and severe bereavement (requiring PEC at this time), she continues to warrant  inpatient care to ensure she has a safe discharge to the appropriate psychiatric facility that will have the resources to care for her. She currently lacks the ability to care for herself at home given the complexity of her medical care and her current apathy and grade disability.  - C diff negative so isolation discontinued   - per psychiatry, patient's SI is improving and she may not require inpatient psychiatry admission upon discharge. However, patient doesn't have insurance coverage for SNF so she would be discharge home with . Patient's  concerned about patient managing her care at home   - will continue discussions with psychiatry and case management to  determine safest setting for patient   - PT/OT ordered     6/8- completes ertepenem tomorrow, on 6/9    Physical deconditioning  - patient notes decline in functional status since admission.   - PT/OT eval      VTE Risk Mitigation (From admission, onward)         Ordered     IP VTE HIGH RISK PATIENT  Once         05/30/23 2228     Place sequential compression device  Until discontinued         05/30/23 2228                Discharge Planning   OK: 6/9/2023     Code Status: Full Code   Is the patient medically ready for discharge?: Yes    Reason for patient still in hospital (select all that apply): Patient trending condition  Discharge Plan A: Psychiatric hospital   Discharge Delays: None known at this time      Mell Adhikari MD  Department of Hospital Medicine   [facility]

## 2023-06-08 NOTE — ASSESSMENT & PLAN NOTE
- Given her h/o MDR UTI requiring IV ertapenem at this time, ureteral strictures s/p nephrostomy tube placement and need for repeat nephrostogram (scheduled for 6/6), need to wound care evaluation for colostomy care and refill of outpatient supplies and her grave disability due to MDD and severe bereavement (requiring PEC at this time), she continues to warrant  inpatient care to ensure she has a safe discharge to the appropriate psychiatric facility that will have the resources to care for her. She currently lacks the ability to care for herself at home given the complexity of her medical care and her current apathy and grade disability.  - C diff negative so isolation discontinued   - per psychiatry, patient's SI is improving and she may not require inpatient psychiatry admission upon discharge. However, patient doesn't have insurance coverage for SNF so she would be discharge home with . Patient's  concerned about patient managing her care at home   - will continue discussions with psychiatry and case management to determine safest setting for patient   - PT/OT ordered     6/8- completes ertepenem tomorrow, on 6/9

## 2023-06-08 NOTE — SUBJECTIVE & OBJECTIVE
Interval History: see above      Review of Systems   Constitutional:  Positive for activity change and fatigue. Negative for chills and fever.   HENT:  Negative for congestion and trouble swallowing.    Eyes:  Negative for visual disturbance.   Respiratory:  Negative for cough and shortness of breath.    Cardiovascular:  Negative for chest pain and leg swelling.   Gastrointestinal:  Negative for abdominal distention, abdominal pain, nausea and vomiting.   Endocrine: Negative for cold intolerance, heat intolerance, polydipsia and polyuria.   Genitourinary:  Positive for flank pain. Negative for difficulty urinating and dysuria.   Musculoskeletal:  Negative for back pain and myalgias.   Skin:  Negative for rash and wound.   Neurological:  Positive for weakness. Negative for dizziness and light-headedness.   Hematological:  Negative for adenopathy. Does not bruise/bleed easily.   Psychiatric/Behavioral:  Negative for confusion and sleep disturbance.    Objective:     Vital Signs (Most Recent):  Temp: 99.1 °F (37.3 °C) (06/08/23 0605)  Pulse: 86 (06/08/23 0605)  Resp: 18 (06/08/23 0605)  BP: (!) 102/56 (06/08/23 0605)  SpO2: 95 % (06/08/23 0605) Vital Signs (24h Range):  Temp:  [97.1 °F (36.2 °C)-99.7 °F (37.6 °C)] 99.1 °F (37.3 °C)  Pulse:  [74-87] 86  Resp:  [11-20] 18  SpO2:  [95 %-100 %] 95 %  BP: ()/(50-62) 102/56     Weight: 83 kg (183 lb)  Body mass index is 27.02 kg/m².    Intake/Output Summary (Last 24 hours) at 6/8/2023 0810  Last data filed at 6/8/2023 0634  Gross per 24 hour   Intake 60 ml   Output 1495 ml   Net -1435 ml           Physical Exam  Constitutional:       Appearance: She is well-developed.   HENT:      Head: Normocephalic and atraumatic.   Eyes:      General: No scleral icterus.     Pupils: Pupils are equal, round, and reactive to light.   Neck:      Vascular: No JVD.   Cardiovascular:      Rate and Rhythm: Normal rate and regular rhythm.      Heart sounds: No murmur heard.    No friction  rub. No gallop.   Pulmonary:      Effort: Pulmonary effort is normal. No respiratory distress.      Breath sounds: Normal breath sounds. No wheezing or rales.   Abdominal:      General: Bowel sounds are normal. There is no distension.      Palpations: Abdomen is soft.      Tenderness: There is no abdominal tenderness. There is no guarding or rebound.      Comments: Ileostomy tube with normal output   Genitourinary:     Comments: Left nephrostomy tube with normal output   Musculoskeletal:         General: No deformity. Normal range of motion.      Cervical back: Neck supple.   Lymphadenopathy:      Cervical: No cervical adenopathy.   Skin:     General: Skin is warm and dry.      Capillary Refill: Capillary refill takes less than 2 seconds.      Findings: No erythema or rash.   Neurological:      Mental Status: She is alert and oriented to person, place, and time.      Cranial Nerves: No cranial nerve deficit.      Sensory: No sensory deficit.   Psychiatric:         Mood and Affect: Mood normal.         CBC:   Recent Labs   Lab 06/07/23  0551 06/08/23  0408   WBC 8.72 8.05   HGB 10.9* 10.3*   HCT 35.0* 33.6*    250       CMP:   Recent Labs   Lab 06/07/23  0551 06/08/23  0409    138   K 3.9 3.9   * 112*   CO2 19* 17*   GLU 86 87   BUN 18 16   CREATININE 1.3 1.1   CALCIUM 8.8 9.1   ANIONGAP 7* 9       Recent Labs   Lab 06/07/23  1109   COLORU Yellow   APPEARANCEUA Hazy*   PHUR 7.0   SPECGRAV 1.010   PROTEINUA 1+*   GLUCUA Negative   KETONESU Negative   BILIRUBINUA Negative   OCCULTUA 2+*   NITRITE Negative   LEUKOCYTESUR 3+*   RBCUA 19*   WBCUA 43*   BACTERIA Moderate*   HYALINECASTS 2*         Microbiology Results (last 7 days)       Procedure Component Value Units Date/Time    Stool culture [[id number]] Collected: 06/05/23 1335    Order Status: Completed Specimen: Stool Updated: 06/07/23 1541     Stool Culture No Salmonella,Shigella,Vibrio,Campylobacter,Yersinia isolated.    Clostridium difficile EIA  [[id number]] Collected: 06/05/23 1335    Order Status: Completed Specimen: Stool Updated: 06/05/23 2114     C. diff Antigen Negative     C difficile Toxins A+B, EIA Negative     Comment: Testing not recommended for children <24 months old.       E. coli 0157 antigen [[id number]] Collected: 06/05/23 1335    Order Status: Canceled Specimen: Stool     Blood culture x two cultures. Draw prior to antibiotics. [[id number]] Collected: 05/30/23 1845    Order Status: Completed Specimen: Blood from Peripheral, Wrist, Right Updated: 06/04/23 2022     Blood Culture, Routine No growth after 5 days.    Narrative:      Aerobic and anaerobic    Blood culture x two cultures. Draw prior to antibiotics. [[id number]] Collected: 05/30/23 1900    Order Status: Completed Specimen: Blood from Peripheral, Wrist, Left Updated: 06/04/23 2022     Blood Culture, Routine No growth after 5 days.    Narrative:      Aerobic and anaerobic    Urine culture [[id number]]  (Abnormal)  (Susceptibility) Collected: 05/30/23 2107    Order Status: Completed Specimen: Urine Updated: 06/01/23 2251     Urine Culture, Routine ENTEROBACTER CLOACAE  > 100,000 cfu/ml      Narrative:      Specimen Source->Urine              Significant Imaging: I have reviewed all pertinent imaging results/findings within the past 24 hours.

## 2023-06-08 NOTE — PLAN OF CARE
Evaluation completed, plan of care established.    Problem: Occupational Therapy  Goal: Occupational Therapy Goal  Description: Goals to be met by: 7/8/23     Patient will increase functional independence with ADLs by performing:    UE Dressing with Modified [place].  LE Dressing with Modified [place].  Grooming while standing at sink with Modified [place].  Toileting from toilet with Modified [place] for hygiene and clothing management.   Supine to sit with Modified [place].  Step transfer with Modified [place]  Toilet transfer to toilet with Modified [place].    Outcome: Ongoing, Progressing

## 2023-06-08 NOTE — ASSESSMENT & PLAN NOTE
Patient has persistent depression which is severe and is currently uncontrolled. Will Increase anti-depressant medications. We will consult psychiatry at this time. Patient does not display psychosis at this time. Continue to monitor closely and adjust plan of care as needed.  - patient's daughter committed suicide recently   - psychiatry and psychology following, apprec recs  - remeron 30 mg nightly   - SW obtaining medical records from [facility] in [place]    6/3 - per CM, the plan is to return to [facility]

## 2023-06-08 NOTE — PROGRESS NOTES
Urology Note    Patient seen and examined on afternoon rounds s/p L nephrostomy tube exchange. Her nephrostomy tube is draining clear yellow urine and she appeared to be in good spirits.     Clinic follow up has been arranged.     Urology will sign off. Please call with questions.    Mana Wilks MD

## 2023-06-09 LAB
ANION GAP SERPL CALC-SCNC: 7 MMOL/L (ref 8–16)
BASOPHILS # BLD AUTO: 0.03 K/UL (ref 0–0.2)
BASOPHILS NFR BLD: 0.4 % (ref 0–1.9)
BUN SERPL-MCNC: 18 MG/DL (ref 6–20)
CALCIUM SERPL-MCNC: 9.1 MG/DL (ref 8.7–10.5)
CHLORIDE SERPL-SCNC: 113 MMOL/L (ref 95–110)
CO2 SERPL-SCNC: 20 MMOL/L (ref 23–29)
CREAT SERPL-MCNC: 1 MG/DL (ref 0.5–1.4)
DIFFERENTIAL METHOD: ABNORMAL
EOSINOPHIL # BLD AUTO: 0.3 K/UL (ref 0–0.5)
EOSINOPHIL NFR BLD: 3.5 % (ref 0–8)
ERYTHROCYTE [DISTWIDTH] IN BLOOD BY AUTOMATED COUNT: 15.4 % (ref 11.5–14.5)
EST. GFR  (NO RACE VARIABLE): >60 ML/MIN/1.73 M^2
GLUCOSE SERPL-MCNC: 102 MG/DL (ref 70–110)
HCT VFR BLD AUTO: 33.1 % (ref 37–48.5)
HGB BLD-MCNC: 10.3 G/DL (ref 12–16)
IMM GRANULOCYTES # BLD AUTO: 0.02 K/UL (ref 0–0.04)
IMM GRANULOCYTES NFR BLD AUTO: 0.3 % (ref 0–0.5)
LYMPHOCYTES # BLD AUTO: 1.5 K/UL (ref 1–4.8)
LYMPHOCYTES NFR BLD: 20.9 % (ref 18–48)
MCH RBC QN AUTO: 27.7 PG (ref 27–31)
MCHC RBC AUTO-ENTMCNC: 31.1 G/DL (ref 32–36)
MCV RBC AUTO: 89 FL (ref 82–98)
MONOCYTES # BLD AUTO: 1.2 K/UL (ref 0.3–1)
MONOCYTES NFR BLD: 17 % (ref 4–15)
NEUTROPHILS # BLD AUTO: 4.2 K/UL (ref 1.8–7.7)
NEUTROPHILS NFR BLD: 57.9 % (ref 38–73)
NRBC BLD-RTO: 0 /100 WBC
PLATELET # BLD AUTO: 230 K/UL (ref 150–450)
PMV BLD AUTO: 10 FL (ref 9.2–12.9)
POCT GLUCOSE: 108 MG/DL (ref 70–110)
POCT GLUCOSE: 116 MG/DL (ref 70–110)
POCT GLUCOSE: 129 MG/DL (ref 70–110)
POCT GLUCOSE: 92 MG/DL (ref 70–110)
POTASSIUM SERPL-SCNC: 3.9 MMOL/L (ref 3.5–5.1)
RBC # BLD AUTO: 3.72 M/UL (ref 4–5.4)
SODIUM SERPL-SCNC: 140 MMOL/L (ref 136–145)
WBC # BLD AUTO: 7.17 K/UL (ref 3.9–12.7)

## 2023-06-09 PROCEDURE — 36415 COLL VENOUS BLD VENIPUNCTURE: CPT | Performed by: STUDENT IN AN ORGANIZED HEALTH CARE EDUCATION/TRAINING PROGRAM

## 2023-06-09 PROCEDURE — 11000001 HC ACUTE MED/SURG PRIVATE ROOM

## 2023-06-09 PROCEDURE — 25000003 PHARM REV CODE 250: Performed by: INTERNAL MEDICINE

## 2023-06-09 PROCEDURE — 85025 COMPLETE CBC W/AUTO DIFF WBC: CPT | Performed by: STUDENT IN AN ORGANIZED HEALTH CARE EDUCATION/TRAINING PROGRAM

## 2023-06-09 PROCEDURE — 99231 SBSQ HOSP IP/OBS SF/LOW 25: CPT | Mod: ,,, | Performed by: INTERNAL MEDICINE

## 2023-06-09 PROCEDURE — 25000003 PHARM REV CODE 250: Performed by: STUDENT IN AN ORGANIZED HEALTH CARE EDUCATION/TRAINING PROGRAM

## 2023-06-09 PROCEDURE — 63600175 PHARM REV CODE 636 W HCPCS: Performed by: INTERNAL MEDICINE

## 2023-06-09 PROCEDURE — 80048 BASIC METABOLIC PNL TOTAL CA: CPT | Performed by: STUDENT IN AN ORGANIZED HEALTH CARE EDUCATION/TRAINING PROGRAM

## 2023-06-09 PROCEDURE — 99231 PR SUBSEQUENT HOSPITAL CARE,LEVL I: ICD-10-PCS | Mod: ,,, | Performed by: INTERNAL MEDICINE

## 2023-06-09 RX ADMIN — TAMSULOSIN HYDROCHLORIDE 0.4 MG: 0.4 CAPSULE ORAL at 08:06

## 2023-06-09 RX ADMIN — MIRTAZAPINE 30 MG: 30 TABLET, FILM COATED ORAL at 09:06

## 2023-06-09 RX ADMIN — GABAPENTIN 200 MG: 100 CAPSULE ORAL at 09:06

## 2023-06-09 RX ADMIN — ERTAPENEM 1 G: 1 INJECTION INTRAMUSCULAR; INTRAVENOUS at 11:06

## 2023-06-09 NOTE — PLAN OF CARE
"Sw met with Pt in the room, Pt very hesitant to dc, states "Im not going anywhere." Sw informed Pt was stable to dc and psych team has cleared Pt to dc home. Pt states "my  is not at home and I came from [facility]." Sw verbalized understanding but reminded Pt she was a voluntary Pt at [facility], Pt states "I was not voluntary Pt, they wanted to send me somewhere." Sw informed Pt she has been with [facility] since [facility] and psych team does not feel she needs placement and medically she is stable for dc. Pt resistant, staff updated, will follow.   "

## 2023-06-09 NOTE — PLAN OF CARE
APPOINTMENT:    Patient Appointment(s) scheduled with Krystal Young MD, on Monday Jun 19, 2023 11:00 AM

## 2023-06-09 NOTE — PLAN OF CARE
Sw to contact spouse for dc, provide community resources and order o/p cm and ready responders for dc needs at home.

## 2023-06-09 NOTE — PLAN OF CARE
[facility] - Med Surg  Discharge Final Note    Primary Care Provider: Primary Doctor No    Expected Discharge Date: 6/9/2023    Final Discharge Note (most recent)       Final Note - 06/09/23 1115          Final Note    Assessment Type Final Discharge Note     Anticipated Discharge Disposition Home or Self Care     Hospital Resources/Appts/Education Provided Community resources provided;Appointments scheduled and added to AVS        Post-Acute Status    Post-Acute Authorization Placement     Post-Acute Placement Status Referrals Sent     Discharge Delays None known at this time                     Important Message from Medicare             Contact Info       No, Primary Doctor   Relationship: PCP - General        Next Steps: Follow up

## 2023-06-10 LAB
ANION GAP SERPL CALC-SCNC: 8 MMOL/L (ref 8–16)
BASOPHILS # BLD AUTO: 0.03 K/UL (ref 0–0.2)
BASOPHILS NFR BLD: 0.5 % (ref 0–1.9)
BUN SERPL-MCNC: 15 MG/DL (ref 6–20)
CALCIUM SERPL-MCNC: 9.7 MG/DL (ref 8.7–10.5)
CHLORIDE SERPL-SCNC: 113 MMOL/L (ref 95–110)
CO2 SERPL-SCNC: 23 MMOL/L (ref 23–29)
CREAT SERPL-MCNC: 1.3 MG/DL (ref 0.5–1.4)
DIFFERENTIAL METHOD: ABNORMAL
EOSINOPHIL # BLD AUTO: 0.2 K/UL (ref 0–0.5)
EOSINOPHIL NFR BLD: 3.1 % (ref 0–8)
ERYTHROCYTE [DISTWIDTH] IN BLOOD BY AUTOMATED COUNT: 15.1 % (ref 11.5–14.5)
EST. GFR  (NO RACE VARIABLE): 47.1 ML/MIN/1.73 M^2
GLUCOSE SERPL-MCNC: 109 MG/DL (ref 70–110)
HCT VFR BLD AUTO: 39.1 % (ref 37–48.5)
HGB BLD-MCNC: 11.6 G/DL (ref 12–16)
IMM GRANULOCYTES # BLD AUTO: 0.01 K/UL (ref 0–0.04)
IMM GRANULOCYTES NFR BLD AUTO: 0.2 % (ref 0–0.5)
LYMPHOCYTES # BLD AUTO: 1.5 K/UL (ref 1–4.8)
LYMPHOCYTES NFR BLD: 23.7 % (ref 18–48)
MCH RBC QN AUTO: 27.2 PG (ref 27–31)
MCHC RBC AUTO-ENTMCNC: 29.7 G/DL (ref 32–36)
MCV RBC AUTO: 92 FL (ref 82–98)
MONOCYTES # BLD AUTO: 0.7 K/UL (ref 0.3–1)
MONOCYTES NFR BLD: 10.8 % (ref 4–15)
NEUTROPHILS # BLD AUTO: 3.9 K/UL (ref 1.8–7.7)
NEUTROPHILS NFR BLD: 61.7 % (ref 38–73)
NRBC BLD-RTO: 0 /100 WBC
PLATELET # BLD AUTO: 309 K/UL (ref 150–450)
PMV BLD AUTO: 10.5 FL (ref 9.2–12.9)
POCT GLUCOSE: 115 MG/DL (ref 70–110)
POCT GLUCOSE: 85 MG/DL (ref 70–110)
POCT GLUCOSE: 87 MG/DL (ref 70–110)
POCT GLUCOSE: 95 MG/DL (ref 70–110)
POTASSIUM SERPL-SCNC: 4.1 MMOL/L (ref 3.5–5.1)
RBC # BLD AUTO: 4.26 M/UL (ref 4–5.4)
SODIUM SERPL-SCNC: 144 MMOL/L (ref 136–145)
WBC # BLD AUTO: 6.38 K/UL (ref 3.9–12.7)

## 2023-06-10 PROCEDURE — 25000003 PHARM REV CODE 250: Performed by: STUDENT IN AN ORGANIZED HEALTH CARE EDUCATION/TRAINING PROGRAM

## 2023-06-10 PROCEDURE — 99231 PR SUBSEQUENT HOSPITAL CARE,LEVL I: ICD-10-PCS | Mod: ,,, | Performed by: INTERNAL MEDICINE

## 2023-06-10 PROCEDURE — 85025 COMPLETE CBC W/AUTO DIFF WBC: CPT | Performed by: STUDENT IN AN ORGANIZED HEALTH CARE EDUCATION/TRAINING PROGRAM

## 2023-06-10 PROCEDURE — 99231 SBSQ HOSP IP/OBS SF/LOW 25: CPT | Mod: ,,, | Performed by: INTERNAL MEDICINE

## 2023-06-10 PROCEDURE — 36415 COLL VENOUS BLD VENIPUNCTURE: CPT | Performed by: STUDENT IN AN ORGANIZED HEALTH CARE EDUCATION/TRAINING PROGRAM

## 2023-06-10 PROCEDURE — 63600175 PHARM REV CODE 636 W HCPCS: Performed by: INTERNAL MEDICINE

## 2023-06-10 PROCEDURE — 11000001 HC ACUTE MED/SURG PRIVATE ROOM

## 2023-06-10 PROCEDURE — 25000003 PHARM REV CODE 250: Performed by: INTERNAL MEDICINE

## 2023-06-10 PROCEDURE — 80048 BASIC METABOLIC PNL TOTAL CA: CPT | Performed by: STUDENT IN AN ORGANIZED HEALTH CARE EDUCATION/TRAINING PROGRAM

## 2023-06-10 RX ORDER — SODIUM CHLORIDE, SODIUM LACTATE, POTASSIUM CHLORIDE, CALCIUM CHLORIDE 600; 310; 30; 20 MG/100ML; MG/100ML; MG/100ML; MG/100ML
INJECTION, SOLUTION INTRAVENOUS CONTINUOUS
Status: ACTIVE | OUTPATIENT
Start: 2023-06-10 | End: 2023-06-10

## 2023-06-10 RX ADMIN — GABAPENTIN 200 MG: 100 CAPSULE ORAL at 08:06

## 2023-06-10 RX ADMIN — SODIUM CHLORIDE, POTASSIUM CHLORIDE, SODIUM LACTATE AND CALCIUM CHLORIDE: 600; 310; 30; 20 INJECTION, SOLUTION INTRAVENOUS at 12:06

## 2023-06-10 RX ADMIN — TAMSULOSIN HYDROCHLORIDE 0.4 MG: 0.4 CAPSULE ORAL at 10:06

## 2023-06-10 RX ADMIN — MIRTAZAPINE 30 MG: 30 TABLET, FILM COATED ORAL at 08:06

## 2023-06-10 NOTE — PLAN OF CARE
[facility] has denied pt. Informed MD Brush. Reported that he will reach out to psych team again. SW discussed with MD Brush pt possibly needing penitentiary care at a NH since pt and spouse resistant to care at home. Will speak to pt.     Gissel Cordero, MSW, LCSW  Weekend -[facility]  [place] [phone]

## 2023-06-10 NOTE — PLAN OF CARE
EDWARDO faxed referral to [facility]'s central intake for review. Will F/U.    PENNY Villegas, LCSW  Weekend -[facility]  [facility] [phone]

## 2023-06-10 NOTE — SUBJECTIVE & OBJECTIVE
Interval History: Patient sitting in bed, eating a meal, inno acute distress. No acute events overnight. No new complaints. Left flank pain better controlled after nephrostomy tube was exchange. In good spirits today. SW faxed discharge summary to [facility] for review and patient was not accepted back due to the facility stating that they were not equipped to meet the patient's medical needs. Primary team discuss discharge planning with psychiatry and social work tomorrow.      Review of Systems   Constitutional:  Positive for activity change and fatigue. Negative for chills and fever.   HENT:  Negative for congestion and trouble swallowing.    Eyes:  Negative for visual disturbance.   Respiratory:  Negative for cough and shortness of breath.    Cardiovascular:  Negative for chest pain and leg swelling.   Gastrointestinal:  Negative for abdominal distention, abdominal pain, nausea and vomiting.   Endocrine: Negative for cold intolerance, heat intolerance, polydipsia and polyuria.   Genitourinary:  Positive for flank pain. Negative for difficulty urinating and dysuria.   Musculoskeletal:  Negative for back pain and myalgias.   Skin:  Negative for rash and wound.   Neurological:  Positive for weakness. Negative for dizziness and light-headedness.   Hematological:  Negative for adenopathy. Does not bruise/bleed easily.   Psychiatric/Behavioral:  Negative for confusion and sleep disturbance.    Objective:     Vital Signs (Most Recent):  Temp: 99 °F (37.2 °C) (06/10/23 1619)  Pulse: 84 (06/10/23 1619)  Resp: 18 (06/10/23 1619)  BP: (!) 107/56 (06/10/23 1619)  SpO2: 99 % (06/10/23 1619) Vital Signs (24h Range):  Temp:  [97.2 °F (36.2 °C)-99 °F (37.2 °C)] 99 °F (37.2 °C)  Pulse:  [72-86] 84  Resp:  [16-18] 18  SpO2:  [97 %-100 %] 99 %  BP: ()/(52-59) 107/56     Weight: 83 kg (183 lb)  Body mass index is 27.02 kg/m².    Intake/Output Summary (Last 24 hours) at 6/10/2023 6907  Last data filed at  6/10/2023 1030  Gross per 24 hour   Intake 300 ml   Output 975 ml   Net -675 ml           Physical Exam  Constitutional:       Appearance: She is well-developed.   HENT:      Head: Normocephalic and atraumatic.   Eyes:      General: No scleral icterus.     Pupils: Pupils are equal, round, and reactive to light.   Neck:      Vascular: No JVD.   Cardiovascular:      Rate and Rhythm: Normal rate and regular rhythm.      Heart sounds: No murmur heard.    No friction rub. No gallop.   Pulmonary:      Effort: Pulmonary effort is normal. No respiratory distress.      Breath sounds: Normal breath sounds. No wheezing or rales.   Abdominal:      General: Bowel sounds are normal. There is no distension.      Palpations: Abdomen is soft.      Tenderness: There is no abdominal tenderness. There is no guarding or rebound.      Comments: Ileostomy tube with normal output   Genitourinary:     Comments: Left nephrostomy tube with normal output   Musculoskeletal:         General: No deformity. Normal range of motion.      Cervical back: Neck supple.   Lymphadenopathy:      Cervical: No cervical adenopathy.   Skin:     General: Skin is warm and dry.      Capillary Refill: Capillary refill takes less than 2 seconds.      Findings: No erythema or rash.   Neurological:      Mental Status: She is alert and oriented to person, place, and time.      Cranial Nerves: No cranial nerve deficit.      Sensory: No sensory deficit.   Psychiatric:         Mood and Affect: Mood normal.           Significant Labs: A1C: No results for input(s): HGBA1C in the last 4320 hours.  Blood Culture:   No results for input(s): LABBLOO in the last 48 hours.    CBC:   Recent Labs   Lab 06/09/23  0413 06/10/23  0814   WBC 7.17 6.38   HGB 10.3* 11.6*   HCT 33.1* 39.1    309       CMP:   Recent Labs   Lab 06/09/23  0413 06/10/23  0814    144   K 3.9 4.1   * 113*   CO2 20* 23    109   BUN 18 15   CREATININE 1.0 1.3   CALCIUM 9.1 9.7    ANIONGAP 7* 8           Microbiology Results (last 7 days)       Procedure Component Value Units Date/Time    Stool culture [[id number]] Collected: 06/05/23 1335    Order Status: Completed Specimen: Stool Updated: 06/07/23 1541     Stool Culture No Salmonella,Shigella,Vibrio,Campylobacter,Yersinia isolated.    Clostridium difficile EIA [[id number]] Collected: 06/05/23 1335    Order Status: Completed Specimen: Stool Updated: 06/05/23 2114     C. diff Antigen Negative     C difficile Toxins A+B, EIA Negative     Comment: Testing not recommended for children <24 months old.       E. coli 0157 antigen [[id number]] Collected: 06/05/23 1335    Order Status: Canceled Specimen: Stool     Blood culture x two cultures. Draw prior to antibiotics. [[id number]] Collected: 05/30/23 1845    Order Status: Completed Specimen: Blood from Peripheral, Wrist, Right Updated: 06/04/23 2022     Blood Culture, Routine No growth after 5 days.    Narrative:      Aerobic and anaerobic    Blood culture x two cultures. Draw prior to antibiotics. [[id number]] Collected: 05/30/23 1900    Order Status: Completed Specimen: Blood from Peripheral, Wrist, Left Updated: 06/04/23 2022     Blood Culture, Routine No growth after 5 days.    Narrative:      Aerobic and anaerobic              Significant Imaging: I have reviewed all pertinent imaging results/findings within the past 24 hours.

## 2023-06-10 NOTE — PROGRESS NOTES
[facility] Medicine  Progress Note    Patient Name: Edita Riley  MRN: [id number]  Patient Class: IP- Inpatient   Admission Date: 5/30/2023  Length of Stay: 5 days  Attending Physician: Dioni Brush MD  Primary Care Provider: Primary Doctor No        Subjective:     Principal Problem:UTI (urinary tract infection)        HPI:  58 year old female with Hx of distal ureteral strictures due to XRT for cervical cancer s/p transverse colon conduit and B/L nephrostomy tubes complicated by MDR infections who presents to the ED for UTI. Patient was discharged last month after presenting with recurrent UTI (E. Faecium VRE). History limited form patient. She had undergone right PCN drain removal and stone extraction with drain replacement. Completed a course of Ertapenem 1 g IV daily for 10 days and Linezolid and was discharged on Bactrim after ID evaluation. Patient denies any fever, chills, nausea, vomiting, hematemesis, cough, chest pain, palpitations, shortness of breath, abdominal pain/distension, changes in bowel or urinary habits, no hematochezia or melena.      Overview/Hospital Course:  Per urology, given good renal function, no indication for inpatient placement of right nephrostomy tube for the moderate right hyrdrouteronephrosis. Per ID, switched to ertapenem based on urine culture which grew Enterobacter and will complete 10 day total course. Midline consult placed. Psychiatry following and remeron increased to 30 mg nightly. Psychology following and SW obtaining records from [facility] in [place] for med list.    6/4-  ertapenem, EOC 6/9/23.  VSS.  Midline placed. She is Med Ready for discharge. PEC and CEC in place. CM working with [facility].    6/5- Tolerating IV ertapenem well but does note more liquid stool from colostomy. Stool studies pending. She is on Remeron 30 mg nightly and remains PEC'd given her grade disability in managing her own care given her  depression and severe bereavement. Given her h/o MDR UTI requiring IV ertapenem at this time, ureteral strictures s/p nephrostomy tube placement and need for repeat nephrostogram (scheduled for 6/6), need to wound care evaluation for colostomy care and refill of outpatient supplies and her grave disability due to MDD and severe bereavement, she requires continued inpatient care to ensure she has a safe discharge to the appropriate psychiatric facility that will have the resources to care for her. She currently lacks the ability to care for herself at home given the complexity of her medical care and her current apathy and grade disability. Arranging for psychiatry facility placement after nephrostogram and stool study results.     6/7- Patient sitting in bed, no acute distress. No acute events overnight. Patient reports left flank pain better controlled. Tolerating IV ertapenem well and will be completed on 6/9/2023. Remains PEC'd for grave disability due to SI 2/2 depression and grief but per psychiatry, suicidal ideations are improving. Left antegrade nephrogram demonstrated continued ureteral obstruction so she underwent exchange of a percutaneous 12 Fr nephrostomy tube today. PT/OT ordered due to functional decline during this hospitalization.     6/8- Nephrostomy tubes pulled per Urology. No back pain. Ostomy working well.  /56. Completes ertapenem tomorrow on 6/9. CM working on dc plan. Will likely need to go home.  is considering LTC- NH in the future.     6/9 - 6/10: Per psychiatry recommendations, primary team rescinded PEC/CEC because pt is no longer in any imminent danger of hurting self or others and not gravely disabled. Pt currently does not meet criteria nor benefit from from involuntary inpatient psychiatric admission. Psychiatry agrees with voluntary admission to psychiatric facility for continued management of depression. Patient completed a 10 day course of IV antibiotics on 6/9/2023.  She has remained stable. Kidney function back to normal. Hemodynamically stable. Mental and functional status at baseline. Pain around left nephrostomy tube controlled after the tube exchange by IR. No complaints at this time. Reports good oral intake and regular output from ileostomy. Primary team working with social work to allow patient to return back to [facility] for additional management of her depression and grief.           Interval History: Patient sitting in bed, eating a meal, inno acute distress. No acute events overnight. No new complaints. Left flank pain better controlled after nephrostomy tube was exchange. In good spirits today. Plan to followup with [facility] to see if they will accept her back to the facility.      Review of Systems   Constitutional:  Positive for activity change and fatigue. Negative for chills and fever.   HENT:  Negative for congestion and trouble swallowing.    Eyes:  Negative for visual disturbance.   Respiratory:  Negative for cough and shortness of breath.    Cardiovascular:  Negative for chest pain and leg swelling.   Gastrointestinal:  Negative for abdominal distention, abdominal pain, nausea and vomiting.   Endocrine: Negative for cold intolerance, heat intolerance, polydipsia and polyuria.   Genitourinary:  Positive for flank pain. Negative for difficulty urinating and dysuria.   Musculoskeletal:  Negative for back pain and myalgias.   Skin:  Negative for rash and wound.   Neurological:  Positive for weakness. Negative for dizziness and light-headedness.   Hematological:  Negative for adenopathy. Does not bruise/bleed easily.   Psychiatric/Behavioral:  Negative for confusion and sleep disturbance.    Objective:     Vital Signs (Most Recent):  Temp: 99 °F (37.2 °C) (06/10/23 1619)  Pulse: 84 (06/10/23 1619)  Resp: 18 (06/10/23 1619)  BP: (!) 107/56 (06/10/23 1619)  SpO2: 99 % (06/10/23 1619) Vital Signs (24h Range):  Temp:  [97.2 °F (36.2  °C)-99 °F (37.2 °C)] 99 °F (37.2 °C)  Pulse:  [72-86] 84  Resp:  [16-18] 18  SpO2:  [97 %-100 %] 99 %  BP: ()/(52-59) 107/56     Weight: 83 kg (183 lb)  Body mass index is 27.02 kg/m².    Intake/Output Summary (Last 24 hours) at 6/10/2023 1805  Last data filed at 6/10/2023 1030  Gross per 24 hour   Intake 300 ml   Output 975 ml   Net -675 ml           Physical Exam  Constitutional:       Appearance: She is well-developed.   HENT:      Head: Normocephalic and atraumatic.   Eyes:      General: No scleral icterus.     Pupils: Pupils are equal, round, and reactive to light.   Neck:      Vascular: No JVD.   Cardiovascular:      Rate and Rhythm: Normal rate and regular rhythm.      Heart sounds: No murmur heard.    No friction rub. No gallop.   Pulmonary:      Effort: Pulmonary effort is normal. No respiratory distress.      Breath sounds: Normal breath sounds. No wheezing or rales.   Abdominal:      General: Bowel sounds are normal. There is no distension.      Palpations: Abdomen is soft.      Tenderness: There is no abdominal tenderness. There is no guarding or rebound.      Comments: Ileostomy tube with normal output   Genitourinary:     Comments: Left nephrostomy tube with normal output   Musculoskeletal:         General: No deformity. Normal range of motion.      Cervical back: Neck supple.   Lymphadenopathy:      Cervical: No cervical adenopathy.   Skin:     General: Skin is warm and dry.      Capillary Refill: Capillary refill takes less than 2 seconds.      Findings: No erythema or rash.   Neurological:      Mental Status: She is alert and oriented to person, place, and time.      Cranial Nerves: No cranial nerve deficit.      Sensory: No sensory deficit.   Psychiatric:         Mood and Affect: Mood normal.           Significant Labs: A1C: No results for input(s): HGBA1C in the last 4320 hours.  Blood Culture:   No results for input(s): LABBLOO in the last 48 hours.    CBC:   Recent Labs   Lab  06/09/23 0413 06/10/23  0814   WBC 7.17 6.38   HGB 10.3* 11.6*   HCT 33.1* 39.1    309       CMP:   Recent Labs   Lab 06/09/23 0413 06/10/23  0814    144   K 3.9 4.1   * 113*   CO2 20* 23    109   BUN 18 15   CREATININE 1.0 1.3   CALCIUM 9.1 9.7   ANIONGAP 7* 8           Microbiology Results (last 7 days)       Procedure Component Value Units Date/Time    Stool culture [[id number]] Collected: 06/05/23 1335    Order Status: Completed Specimen: Stool Updated: 06/07/23 1541     Stool Culture No Salmonella,Shigella,Vibrio,Campylobacter,Yersinia isolated.    Clostridium difficile EIA [[id number]] Collected: 06/05/23 1335    Order Status: Completed Specimen: Stool Updated: 06/05/23 2114     C. diff Antigen Negative     C difficile Toxins A+B, EIA Negative     Comment: Testing not recommended for children <24 months old.       E. coli 0157 antigen [[id number]] Collected: 06/05/23 1335    Order Status: Canceled Specimen: Stool     Blood culture x two cultures. Draw prior to antibiotics. [[id number]] Collected: 05/30/23 1845    Order Status: Completed Specimen: Blood from Peripheral, Wrist, Right Updated: 06/04/23 2022     Blood Culture, Routine No growth after 5 days.    Narrative:      Aerobic and anaerobic    Blood culture x two cultures. Draw prior to antibiotics. [[id number]] Collected: 05/30/23 1900    Order Status: Completed Specimen: Blood from Peripheral, Wrist, Left Updated: 06/04/23 2022     Blood Culture, Routine No growth after 5 days.    Narrative:      Aerobic and anaerobic              Significant Imaging: I have reviewed all pertinent imaging results/findings within the past 24 hours.      Assessment/Plan:      * UTI (urinary tract infection)  Bilateral flank pain  - Recurrent Urinary tact infection related to nephrostomy tube and chronic uretral strictures  - Follow up urine cultures and blood cultures  - Urine culture 4/10 growing klebsiella and enterococcus.  - followup urine  cultures   - prn percocet and prn IV morphine   - ID consulted. apprec recs  -- urine cx grew Enterobacter   -- changed meropenem to ertapenem to complete 10 day course. Antibiotic course completed on 6/9/2023.    Major depressive disorder, recurrent episode, moderate  Patient has persistent depression which is severe and is currently uncontrolled. Will Increase anti-depressant medications. We will consult psychiatry at this time. Patient does not display psychosis at this time. Continue to monitor closely and adjust plan of care as needed.  - patient's daughter committed suicide recently   - psychiatry and psychology following, apprec recs  - remeron 30 mg nightly   - coordinating with SW and will consulted psychiatry on 6/11 to assist with discharge planning since patient was declined by [facility]      Discharge planning issues  - UTI treatment was completed with IV ertapenem   - C diff negative so isolation discontinued   - per psychiatry, patient no longer an imminent threat to herself or others and is not gravely disabled so PEC was rescinded. Plan to discharge today if she is approved to return back to [facility] in [place]. Patient agrees to return back to [facility].  - PT/OT recommend home. No post acute care needs for PT/OT    Physical deconditioning  - patient notes decline in functional status since admission.   - PT/OT recommend home. No post acute care PT/OT needs upon discharge.      Nephrostomy status  - S/p Bilateral Nephrostomy tubes; R. Removed after recent complication and remains with L.Nephrostomy  - Continue with Flomax 0.4 mg, daily  - urology following.   -- s/p nephrogram on 6/6/23 which showed continued ureteral obstruction. Exchange of a percutaneous 12 Fr nephrostomy tube without complication on 6/7/2023 by IR  - outpatient urology followup      Ileostomy in place  - S/p Transverse colon conduit 2020 complicate by bowel perforation requiring hemicolectomy & ileostomy  - increased  watery stool from ileostomy on 6/5. Stool studies negative.   - started imodium prn  - wound care evaluated the patient and provided ostomy supplies.  - Follow up outpatient CRS    ODESSA (acute kidney injury)  ODESSA on CKD  - Scr on admit, 1.7 --> 1.3   - Monitor renal function  - Avoid Nephrotoxic agents  - Monitor urine output   - RESOVLED       VTE Risk Mitigation (From admission, onward)           Ordered     IP VTE HIGH RISK PATIENT  Once         05/30/23 2228     Place sequential compression device  Until discontinued         05/30/23 2228                    Discharge Planning   OK: 6/10/2023     Code Status: Full Code   Is the patient medically ready for discharge?: Yes    Reason for patient still in hospital (select all that apply): Patient trending condition, Laboratory test, Treatment, Imaging, Consult recommendations and Pending disposition  Discharge Plan A: Psychiatric hospital   Discharge Delays: None known at this time        Time spent in care of patient: > 35 minutes         Dioni Brush MD  Department of Hospital Medicine   [facility]

## 2023-06-10 NOTE — ASSESSMENT & PLAN NOTE
- S/p Bilateral Nephrostomy tubes; R. Removed after recent complication and remains with L.Nephrostomy  - Continue with Flomax 0.4 mg, daily  - urology following.   -- s/p nephrogram on 6/6/23 which showed continued ureteral obstruction. Exchange of a percutaneous 12 Fr nephrostomy tube without complication on 6/7/2023 by IR  - outpatient urology followup

## 2023-06-10 NOTE — ASSESSMENT & PLAN NOTE
- S/p Transverse colon conduit 2020 complicate by bowel perforation requiring hemicolectomy & ileostomy  - increased watery stool from ileostomy on 6/5. Stool studies negative.   - started imodium prn  - wound care evaluated the patient and provided ostomy supplies.  - Follow up outpatient CRS

## 2023-06-10 NOTE — PROGRESS NOTES
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 Patient was discharged last month after presenting with recurrent UTI (E. Faecium VRE). History limited form patient. She had undergone right PCN drain removal and stone extraction with drain replacement. Completed a course of Ertapenem 1 g IV daily for 10 days and Linezolid and was discharged on Bactrim after ID evaluation. Patient denies any fever, chills, nausea, vomiting, hematemesis, cough, chest pain, palpitations, shortness of breath, abdominal pain/distension, changes in bowel or urinary habits, no hematochezia or melena.      Overview/Hospital Course:  Per urology, given good renal function, no indication for inpatient placement of right nephrostomy tube for the moderate right hyrdrouteronephrosis. Per ID, switched to ertapenem based on urine culture which grew Enterobacter and will complete 10 day total course. Midline consult placed. Psychiatry following and remeron increased to 30 mg nightly. Psychology following and SW obtaining records from [facility] in [place] for med list.    6/4-  ertapenem, EOC 6/9/23.  VSS.  Midline placed. She is Med Ready for discharge. PEC and CEC in place. CM working with [facility].    6/5- Tolerating IV ertapenem well but does note more liquid stool from colostomy. Stool studies pending. She is on Remeron 30 mg nightly and remains PEC'd given her grade disability in managing her own care given her  depression and severe bereavement. Given her h/o MDR UTI requiring IV ertapenem at this time, ureteral strictures s/p nephrostomy tube placement and need for repeat nephrostogram (scheduled for 6/6), need to wound care evaluation for colostomy care and refill of outpatient supplies and her grave disability due to MDD and severe bereavement, she requires continued inpatient care to ensure she has a safe discharge to the appropriate psychiatric facility that will have the resources to care for her. She currently lacks the ability to care for herself at home given the complexity of her medical care and her current apathy and grade disability. Arranging for psychiatry facility placement after nephrostogram and stool study results.     6/7- Patient sitting in bed, no acute distress. No acute events overnight. Patient reports left flank pain better controlled. Tolerating IV ertapenem well and will be completed on 6/9/2023. Remains PEC'd for grave disability due to SI 2/2 depression and grief but per psychiatry, suicidal ideations are improving. Left antegrade nephrogram demonstrated continued ureteral obstruction so she underwent exchange of a percutaneous 12 Fr nephrostomy tube today. PT/OT ordered due to functional decline during this hospitalization.     6/8- Nephrostomy tubes pulled per Urology. No back pain. Ostomy working well.  /56. Completes ertapenem tomorrow on 6/9. CM working on dc plan. Will likely need to go home.  is considering LTC- NH in the future.     6/9 - 6/10: Per psychiatry recommendations, primary team rescinded PEC/CEC because pt is no longer in any imminent danger of hurting self or others and not gravely disabled. Pt currently does not meet criteria nor benefit from from involuntary inpatient psychiatric admission. Psychiatry agrees with voluntary admission to psychiatric facility for continued management of depression. Patient completed a 10 day course of IV antibiotics on 6/9/2023.  She has remained stable. Kidney function back to normal. Hemodynamically stable. Mental and functional status at baseline. Pain around left nephrostomy tube controlled after the tube exchange by IR. No complaints at this time. Reports good oral intake and regular output from ileostomy. Since [facility] declined approval for readmission due to high medical needs, primary team discuss discharge planning with psychiatry and social work tomorrow.          Interval History: Patient sitting in bed, no acute distress and pleasant. No acute events overnight. No new complaints. Since [facility] declined approval for readmission due to high medical needs, primary team discuss discharge planning with psychiatry and social work tomorrow.      Review of Systems   Constitutional:  Positive for activity change and fatigue. Negative for chills and fever.   HENT:  Negative for congestion and trouble swallowing.    Eyes:  Negative for visual disturbance.   Respiratory:  Negative for cough and shortness of breath.    Cardiovascular:  Negative for chest pain and leg swelling.   Gastrointestinal:  Negative for abdominal distention, abdominal pain, nausea and vomiting.   Endocrine: Negative for cold intolerance, heat intolerance, polydipsia and polyuria.   Genitourinary:  Positive for flank pain. Negative for difficulty urinating and dysuria.   Musculoskeletal:  Negative for back pain and myalgias.   Skin:  Negative for rash and wound.   Neurological:  Positive for weakness. Negative for dizziness and light-headedness.   Hematological:  Negative for adenopathy. Does not bruise/bleed easily.   Psychiatric/Behavioral:  Negative for confusion and sleep disturbance.    Objective:     Vital Signs (Most Recent):  Temp: 99 °F (37.2 °C) (06/10/23 1619)  Pulse: 84 (06/10/23 1619)  Resp: 18 (06/10/23 1619)  BP: (!) 107/56 (06/10/23 1619)  SpO2: 99 % (06/10/23 1619) Vital Signs (24h Range):  Temp:  [97.2 °F (36.2 °C)-99 °F (37.2 °C)] 99 °F (37.2  °C)  Pulse:  [72-86] 84  Resp:  [16-18] 18  SpO2:  [97 %-100 %] 99 %  BP: ()/(52-59) 107/56     Weight: 83 kg (183 lb)  Body mass index is 27.02 kg/m².    Intake/Output Summary (Last 24 hours) at 6/10/2023 1809  Last data filed at 6/10/2023 1030  Gross per 24 hour   Intake 300 ml   Output 975 ml   Net -675 ml           Physical Exam  Constitutional:       Appearance: She is well-developed.   HENT:      Head: Normocephalic and atraumatic.   Eyes:      General: No scleral icterus.     Pupils: Pupils are equal, round, and reactive to light.   Neck:      Vascular: No JVD.   Cardiovascular:      Rate and Rhythm: Normal rate and regular rhythm.      Heart sounds: No murmur heard.    No friction rub. No gallop.   Pulmonary:      Effort: Pulmonary effort is normal. No respiratory distress.      Breath sounds: Normal breath sounds. No wheezing or rales.   Abdominal:      General: Bowel sounds are normal. There is no distension.      Palpations: Abdomen is soft.      Tenderness: There is no abdominal tenderness. There is no guarding or rebound.      Comments: Ileostomy tube with normal output   Genitourinary:     Comments: Left nephrostomy tube with normal output   Musculoskeletal:         General: No deformity. Normal range of motion.      Cervical back: Neck supple.   Lymphadenopathy:      Cervical: No cervical adenopathy.   Skin:     General: Skin is warm and dry.      Capillary Refill: Capillary refill takes less than 2 seconds.      Findings: No erythema or rash.   Neurological:      Mental Status: She is alert and oriented to person, place, and time.      Cranial Nerves: No cranial nerve deficit.      Sensory: No sensory deficit.   Psychiatric:         Mood and Affect: Mood normal.           Significant Labs: A1C: No results for input(s): HGBA1C in the last 4320 hours.  Blood Culture:   No results for input(s): LABBLOO in the last 48 hours.    CBC:   Recent Labs   Lab 06/09/23  0413 06/10/23  0814   WBC 7.17 6.38    HGB 10.3* 11.6*   HCT 33.1* 39.1    309       CMP:   Recent Labs   Lab 06/09/23  0413 06/10/23  0814    144   K 3.9 4.1   * 113*   CO2 20* 23    109   BUN 18 15   CREATININE 1.0 1.3   CALCIUM 9.1 9.7   ANIONGAP 7* 8     No results for input(s): COLORU, APPEARANCEUA, PHUR, SPECGRAV, PROTEINUA, GLUCUA, KETONESU, BILIRUBINUA, OCCULTUA, NITRITE, UROBILINOGEN, LEUKOCYTESUR, RBCUA, WBCUA, BACTERIA, SQUAMEPITHEL, HYALINECASTS in the last 48 hours.    Invalid input(s): WRIGHTR      Microbiology Results (last 7 days)       Procedure Component Value Units Date/Time    Stool culture [[id number]] Collected: 06/05/23 1335    Order Status: Completed Specimen: Stool Updated: 06/07/23 1541     Stool Culture No Salmonella,Shigella,Vibrio,Campylobacter,Yersinia isolated.    Clostridium difficile EIA [[id number]] Collected: 06/05/23 1335    Order Status: Completed Specimen: Stool Updated: 06/05/23 2114     C. diff Antigen Negative     C difficile Toxins A+B, EIA Negative     Comment: Testing not recommended for children <24 months old.       E. coli 0157 antigen [[id number]] Collected: 06/05/23 1335    Order Status: Canceled Specimen: Stool     Blood culture x two cultures. Draw prior to antibiotics. [[id number]] Collected: 05/30/23 1845    Order Status: Completed Specimen: Blood from Peripheral, Wrist, Right Updated: 06/04/23 2022     Blood Culture, Routine No growth after 5 days.    Narrative:      Aerobic and anaerobic    Blood culture x two cultures. Draw prior to antibiotics. [[id number]] Collected: 05/30/23 1900    Order Status: Completed Specimen: Blood from Peripheral, Wrist, Left Updated: 06/04/23 2022     Blood Culture, Routine No growth after 5 days.    Narrative:      Aerobic and anaerobic              Significant Imaging: I have reviewed all pertinent imaging results/findings within the past 24 hours.      Assessment/Plan:      * UTI (urinary tract infection)  Bilateral flank pain  - Recurrent  Urinary tact infection related to nephrostomy tube and chronic uretral strictures  - Follow up urine cultures and blood cultures  - Urine culture 4/10 growing klebsiella and enterococcus.  - followup urine cultures   - prn percocet and prn IV morphine   - ID consulted. apprec recs  -- urine cx grew Enterobacter   -- changed meropenem to ertapenem to complete 10 day course. Antibiotic course completed on 6/9/2023.    Major depressive disorder, recurrent episode, moderate  Patient has persistent depression which is severe and is currently uncontrolled. Will Increase anti-depressant medications. We will consult psychiatry at this time. Patient does not display psychosis at this time. Continue to monitor closely and adjust plan of care as needed.  - patient's daughter committed suicide recently   - psychiatry and psychology following, apprec recs  - remeron 30 mg nightly   - coordinating with SW and will consulted psychiatry on 6/11 to assist with discharge planning since patient was declined by [facility]      Discharge planning issues  - UTI treatment was completed with IV ertapenem   - C diff negative so isolation discontinued   - per psychiatry, patient no longer an imminent threat to herself or others and is not gravely disabled so PEC was rescinded. Since [facility] declined approval for readmission due to high medical needs, primary team discuss discharge planning with psychiatry and social work tomorrow.  - PT/OT recommend home. No post acute care needs for PT/OT    Physical deconditioning  - patient notes decline in functional status since admission.   - PT/OT recommend home. No post acute care PT/OT needs upon discharge.      Nephrostomy status  - S/p Bilateral Nephrostomy tubes; R. Removed after recent complication and remains with L.Nephrostomy  - Continue with Flomax 0.4 mg, daily  - urology following.   -- s/p nephrogram on 6/6/23 which showed continued ureteral obstruction. Exchange of a percutaneous 12 Fr  nephrostomy tube without complication on 6/7/2023 by IR  - outpatient urology followup      Ileostomy in place  - S/p Transverse colon conduit 2020 complicate by bowel perforation requiring hemicolectomy & ileostomy  - increased watery stool from ileostomy on 6/5. Stool studies negative.   - started imodium prn  - wound care evaluated the patient and provided ostomy supplies.  - Follow up outpatient CRS    ODESSA (acute kidney injury)  ODESSA on CKD  - Scr on admit, 1.7 --> 1.3   - Monitor renal function  - Avoid Nephrotoxic agents  - Monitor urine output   - RESOVLED       VTE Risk Mitigation (From admission, onward)         Ordered     IP VTE HIGH RISK PATIENT  Once         05/30/23 2228     Place sequential compression device  Until discontinued         05/30/23 2228                Discharge Planning   OK: 6/10/2023     Code Status: Full Code   Is the patient medically ready for discharge?: Yes    Reason for patient still in hospital (select all that apply): Patient trending condition, Laboratory test, Treatment, Consult recommendations and Pending disposition  Discharge Plan A: Psychiatric hospital   Discharge Delays: None known at this time        Time spent in care of patient: > 35 minutes         Dioni Brush MD  Department of Hospital Medicine   [facility]

## 2023-06-10 NOTE — ASSESSMENT & PLAN NOTE
Patient has persistent depression which is severe and is currently uncontrolled. Will Increase anti-depressant medications. We will consult psychiatry at this time. Patient does not display psychosis at this time. Continue to monitor closely and adjust plan of care as needed.  - patient's daughter committed suicide recently   - psychiatry and psychology following, apprec recs  - remeron 30 mg nightly   - coordinating with SW and will consulted psychiatry on 6/11 to assist with discharge planning since patient was declined by [facility]

## 2023-06-10 NOTE — ASSESSMENT & PLAN NOTE
- UTI treatment was completed with IV ertapenem   - C diff negative so isolation discontinued   - per psychiatry, patient no longer an imminent threat to herself or others and is not gravely disabled so PEC was rescinded. Since [facility] declined approval for readmission due to high medical needs, primary team discuss discharge planning with psychiatry and social work tomorrow.  - PT/OT recommend home. No post acute care needs for PT/OT

## 2023-06-10 NOTE — ASSESSMENT & PLAN NOTE
- patient notes decline in functional status since admission.   - PT/OT recommend home. No post acute care PT/OT needs upon discharge.

## 2023-06-10 NOTE — DISCHARGE SUMMARY
[facility] Medicine  Discharge Summary      Patient Name: Edita Riley  MRN: [id number]  LUIZ: [id number]  Patient Class: IP- Inpatient  Admission Date: 5/30/2023  Hospital Length of Stay: 5 days  Discharge Date and Time:  06/10/2023 12:53 PM  Attending Physician: Dioni Brush MD   Discharging Provider: Dioni Brush MD  Primary Care Provider: Primary Doctor 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 Patient was discharged last month after presenting with recurrent UTI (E. Faecium VRE). History limited form patient. She had undergone right PCN drain removal and stone extraction with drain replacement. Completed a course of Ertapenem 1 g IV daily for 10 days and Linezolid and was discharged on Bactrim after ID evaluation. Patient denies any fever, chills, nausea, vomiting, hematemesis, cough, chest pain, palpitations, shortness of breath, abdominal pain/distension, changes in bowel or urinary habits, no hematochezia or melena.      Procedure(s) (LRB):  Nephrostogram - antegrade (Left)  LOOPOGRAM (N/A)      Hospital Course:   Per urology, given good renal function, no indication for inpatient placement of right nephrostomy tube for the moderate right hyrdrouteronephrosis. Per ID, switched to ertapenem based on urine culture which grew Enterobacter and will complete 10 day total course. Midline consult placed. Psychiatry following and remeron increased to 30 mg nightly. Psychology following and SW obtaining records from [facility] in [place] for med list.    6/4-  ertapenem, EOC 6/9/23.  VSS.  Midline placed. She is Med Ready for discharge. PEC and CEC in place. CM working with [facility].    6/5-  Tolerating IV ertapenem well but does note more liquid stool from colostomy. Stool studies pending. She is on Remeron 30 mg nightly and remains PEC'd given her grade disability in managing her own care given her depression and severe bereavement. Given her h/o MDR UTI requiring IV ertapenem at this time, ureteral strictures s/p nephrostomy tube placement and need for repeat nephrostogram (scheduled for 6/6), need to wound care evaluation for colostomy care and refill of outpatient supplies and her grave disability due to MDD and severe bereavement, she requires continued inpatient care to ensure she has a safe discharge to the appropriate psychiatric facility that will have the resources to care for her. She currently lacks the ability to care for herself at home given the complexity of her medical care and her current apathy and grade disability. Arranging for psychiatry facility placement after nephrostogram and stool study results.     6/7- Patient sitting in bed, no acute distress. No acute events overnight. Patient reports left flank pain better controlled. Tolerating IV ertapenem well and will be completed on 6/9/2023. Remains PEC'd for grave disability due to SI 2/2 depression and grief but per psychiatry, suicidal ideations are improving. Left antegrade nephrogram demonstrated continued ureteral obstruction so she underwent exchange of a percutaneous 12 Fr nephrostomy tube today. PT/OT ordered due to functional decline during this hospitalization.     6/8- Nephrostomy tubes pulled per Urology. No back pain. Ostomy working well.  /56. Completes ertapenem tomorrow on 6/9. CM working on dc plan. Will likely need to go home.  is considering LTC- NH in the future.     6/9 - 6/10: Per psychiatry recommendations, primary team rescinded PEC/CEC because pt is no longer in any imminent danger of hurting self or others and not gravely disabled. Pt currently does not meet criteria nor benefit from from  involuntary inpatient psychiatric admission. Psychiatry agrees with voluntary admission to psychiatric facility for continued management of depression. Patient completed a 10 day course of IV antibiotics on 6/9/2023. She has remained stable. Kidney function back to normal. Hemodynamically stable. Mental and functional status at baseline. Pain around left nephrostomy tube controlled after the tube exchange by IR. No complaints at this time. Reports good oral intake and regular output from ileostomy. Primary team working with social work to allow patient to return back to [facility] for additional management of her depression and grief.          Goals of Care Treatment Preferences:  Code Status: Full Code      Consults:   Consults (From admission, onward)          Status Ordering Provider     Inpatient consult to Interventional Radiology  Once        Provider:  (Not yet assigned)    Completed KAROLINA CURRY     Inpatient consult to Midline team  Once        Provider:  (Not yet assigned)    Completed BERNARDO DICKINSON     Inpatient consult to Psychology  Once        Provider:  (Not yet assigned)    Completed BERNARDO DICKINSON     Inpatient consult to Psychiatry  Once        Provider:  (Not yet assigned)    Completed BERNARDO DICKINSON     IP consult to case management  Once        Provider:  (Not yet assigned)    Acknowledged JEFFREY CADET     Inpatient consult to Infectious Diseases  Once        Provider:  (Not yet assigned)    Completed ROCHELLE TRUJILLO            Psychiatric  Major depressive disorder, recurrent episode, moderate  Patient has persistent depression which is severe and is currently uncontrolled. Will Increase anti-depressant medications. We will consult psychiatry at this time. Patient does not display psychosis at this time. Continue to monitor closely and adjust plan of care as needed.  - patient's daughter committed suicide recently   - psychiatry and psychology  following, apprec recs  - remeron 30 mg nightly   - coordinating with SW to plan patient's return to [facility]      Renal/  * UTI (urinary tract infection)  Bilateral flank pain  - Recurrent Urinary tact infection related to nephrostomy tube and chronic uretral strictures  - Follow up urine cultures and blood cultures  - Urine culture 4/10 growing klebsiella and enterococcus.  - followup urine cultures   - prn percocet and prn IV morphine   - ID consulted. apprec recs  -- urine cx grew Enterobacter   -- changed meropenem to ertapenem to complete 10 day course. Antibiotic course completed on 6/9/2023.    Nephrostomy status  - S/p Bilateral Nephrostomy tubes; R. Removed after recent complication and remains with L.Nephrostomy  - Continue with Flomax 0.4 mg, daily  - urology following.   -- s/p nephrogram on 6/6/23 which showed continued ureteral obstruction. Exchange of a percutaneous 12 Fr nephrostomy tube without complication on 6/7/2023 by IR  - outpatient urology followup      ODESSA (acute kidney injury)  ODESSA on CKD  - Scr on admit, 1.7 --> 1.3   - Monitor renal function  - Avoid Nephrotoxic agents  - Monitor urine output   - RESOVLED     GI  Ileostomy in place  - S/p Transverse colon conduit 2020 complicate by bowel perforation requiring hemicolectomy & ileostomy  - increased watery stool from ileostomy on 6/5. Stool studies negative.   - started imodium prn  - wound care evaluated the patient and provided ostomy supplies.  - Follow up outpatient CRS    Other  Discharge planning issues  - UTI treatment was completed with IV ertapenem   - C diff negative so isolation discontinued   - per psychiatry, patient no longer an imminent threat to herself or others and is not gravely disabled so PEC was rescinded. Plan to discharge today if she is approved to return back to [facility] in [place]. Patient agrees to return back to [facility].  - PT/OT recommend home. No post acute care needs for  PT/OT    Physical deconditioning  - patient notes decline in functional status since admission.   - PT/OT recommend home. No post acute care PT/OT needs upon discharge.        Final Active Diagnoses:    Diagnosis Date Noted POA    PRINCIPAL PROBLEM:  UTI (urinary tract infection) [N39.0] 02/16/2023 Yes    Major depressive disorder, recurrent episode, moderate [F33.1] 06/02/2023 Yes    Discharge planning issues [Z02.9] 04/30/2021 Not Applicable    Physical deconditioning [R53.81] 04/30/2021 Yes    Nephrostomy status [Z93.6]  Not Applicable     Chronic    Ileostomy in place [Z93.2]  Not Applicable     Chronic    ODESSA (acute kidney injury) [N17.9] 03/21/2020 Yes      Problems Resolved During this Admission:       Discharged Condition: good    Disposition: Home or Self Care    Follow Up:   Follow-up Information       Primary Doctor No Follow up.                           Patient Instructions:      Ambulatory referral/consult to Outpatient Case Management   Referral Priority: Routine Referral Type: Consultation   Referral Reason: Specialty Services Required   Number of Visits Requested: 1     Diet Adult Regular     Notify your health care provider if you experience any of the following:  temperature >100.4     Notify your health care provider if you experience any of the following:  persistent nausea and vomiting or diarrhea     Notify your health care provider if you experience any of the following:  severe uncontrolled pain     Notify your health care provider if you experience any of the following:  redness, tenderness, or signs of infection (pain, swelling, redness, odor or green/yellow discharge around incision site)     Notify your health care provider if you experience any of the following:  difficulty breathing or increased cough     Notify your health care provider if you experience any of the following:  severe persistent headache     Notify your health care provider if you experience any of the following:   worsening rash     Notify your health care provider if you experience any of the following:  persistent dizziness, light-headedness, or visual disturbances     Notify your health care provider if you experience any of the following:  increased confusion or weakness     Activity as tolerated     Ambulatory Request for Ready Responder Services   Standing Status: Future Standing Exp. Date: 06/09/24     Order Specific Question Answer Comments   Program referred to: COVID-19 Vaccine      Ambulatory Request for Ready Responder Services   Standing Status: Future Standing Exp. Date: 06/09/24     Order Specific Question Answer Comments   Program referred to: COVID-19 Vaccine        Significant Diagnostic Studies: Labs:   CMP   Recent Labs   Lab 06/09/23  0413 06/10/23  0814    144   K 3.9 4.1   * 113*   CO2 20* 23    109   BUN 18 15   CREATININE 1.0 1.3   CALCIUM 9.1 9.7   ANIONGAP 7* 8   , CBC   Recent Labs   Lab 06/09/23  0413 06/10/23  0814   WBC 7.17 6.38   HGB 10.3* 11.6*   HCT 33.1* 39.1    309   , INR   Lab Results   Component Value Date    INR 0.9 06/06/2023    INR 1.0 03/01/2023    INR 1.0 02/14/2023   , Troponin No results for input(s): TROPONINI in the last 168 hours. and A1C: No results for input(s): HGBA1C in the last 4320 hours.  Microbiology:   Microbiology Results (last 7 days)       Procedure Component Value Units Date/Time    Stool culture [[id number]] Collected: 06/05/23 1335    Order Status: Completed Specimen: Stool Updated: 06/07/23 1541     Stool Culture No Salmonella,Shigella,Vibrio,Campylobacter,Yersinia isolated.    Clostridium difficile EIA [[id number]] Collected: 06/05/23 1335    Order Status: Completed Specimen: Stool Updated: 06/05/23 2114     C. diff Antigen Negative     C difficile Toxins A+B, EIA Negative     Comment: Testing not recommended for children <24 months old.       E. coli 0157 antigen [[id number]] Collected: 06/05/23 1335    Order Status: Canceled Specimen:  Stool     Blood culture x two cultures. Draw prior to antibiotics. [[id number]] Collected: 05/30/23 1845    Order Status: Completed Specimen: Blood from Peripheral, Wrist, Right Updated: 06/04/23 2022     Blood Culture, Routine No growth after 5 days.    Narrative:      Aerobic and anaerobic    Blood culture x two cultures. Draw prior to antibiotics. [[id number]] Collected: 05/30/23 1900    Order Status: Completed Specimen: Blood from Peripheral, Wrist, Left Updated: 06/04/23 2022     Blood Culture, Routine No growth after 5 days.    Narrative:      Aerobic and anaerobic            Radiology:   Imaging Results    None       CXR (6/7/23):    FINDINGS:  Cardiac size is normal.  Lungs are clear.  No infiltrate is seen.     Impression:     Normal chest        Pending Diagnostic Studies:       None           Medications:  Reconciled Home Medications:      Medication List        START taking these medications      mirtazapine 30 MG tablet  Commonly known as: REMERON  Take 1 tablet (30 mg total) by mouth nightly.     oxyCODONE 5 MG immediate release tablet  Commonly known as: ROXICODONE  Take 1 tablet (5 mg total) by mouth every 12 (twelve) hours as needed for Pain.            CONTINUE taking these medications      DETROL LA 2 MG Cp24  Generic drug: tolterodine  Take 2 mg by mouth once daily.     gabapentin 100 MG capsule  Commonly known as: NEURONTIN  Take 2 capsules (200 mg total) by mouth every evening.     tamsulosin 0.4 mg Cap  Commonly known as: FLOMAX  Take 1 capsule (0.4 mg total) by mouth once daily. For spasm associated with nephrostomy tubes            STOP taking these medications      albuterol 90 mcg/actuation inhaler  Commonly known as: VENTOLIN HFA     DULoxetine 60 mg Cdrs     oxybutynin 10 MG 24 hr tablet  Commonly known as: DITROPAN-XL     risperiDONE 0.5 MG Tab  Commonly known as: RISPERDAL     traZODone 100 MG tablet  Commonly known as: DESYREL              Indwelling Lines/Drains at time of discharge:    Lines/Drains/Airways       Drain  Duration                  Urostomy 09/11/20 0000 ileal conduit LLQ 1002 days         Ileostomy 09/18/20  days         Nephrostomy 06/07/23 1349 Left 12 Fr. 2 days                    Time spent on the discharge of patient: 45 minutes         Dioni Brush MD  Department of Hospital Medicine  [facility]

## 2023-06-10 NOTE — ASSESSMENT & PLAN NOTE
- UTI treatment was completed with IV ertapenem   - C diff negative so isolation discontinued   - per psychiatry, patient no longer an imminent threat to herself or others and is not gravely disabled so PEC was rescinded. Plan to discharge today if she is approved to return back to [facility] in [place]. Patient agrees to return back to [facility].  - PT/OT recommend home. No post acute care needs for PT/OT

## 2023-06-10 NOTE — SUBJECTIVE & OBJECTIVE
Interval History: Patient sitting in bed, no acute distress and pleasant. No acute events overnight. No new complaints. Since [facility] declined approval for readmission due to high medical needs, primary team discuss discharge planning with psychiatry and social work tomorrow.      Review of Systems   Constitutional:  Positive for activity change and fatigue. Negative for chills and fever.   HENT:  Negative for congestion and trouble swallowing.    Eyes:  Negative for visual disturbance.   Respiratory:  Negative for cough and shortness of breath.    Cardiovascular:  Negative for chest pain and leg swelling.   Gastrointestinal:  Negative for abdominal distention, abdominal pain, nausea and vomiting.   Endocrine: Negative for cold intolerance, heat intolerance, polydipsia and polyuria.   Genitourinary:  Positive for flank pain. Negative for difficulty urinating and dysuria.   Musculoskeletal:  Negative for back pain and myalgias.   Skin:  Negative for rash and wound.   Neurological:  Positive for weakness. Negative for dizziness and light-headedness.   Hematological:  Negative for adenopathy. Does not bruise/bleed easily.   Psychiatric/Behavioral:  Negative for confusion and sleep disturbance.    Objective:     Vital Signs (Most Recent):  Temp: 99 °F (37.2 °C) (06/10/23 1619)  Pulse: 84 (06/10/23 1619)  Resp: 18 (06/10/23 1619)  BP: (!) 107/56 (06/10/23 1619)  SpO2: 99 % (06/10/23 1619) Vital Signs (24h Range):  Temp:  [97.2 °F (36.2 °C)-99 °F (37.2 °C)] 99 °F (37.2 °C)  Pulse:  [72-86] 84  Resp:  [16-18] 18  SpO2:  [97 %-100 %] 99 %  BP: ()/(52-59) 107/56     Weight: 83 kg (183 lb)  Body mass index is 27.02 kg/m².    Intake/Output Summary (Last 24 hours) at 6/10/2023 1809  Last data filed at 6/10/2023 1030  Gross per 24 hour   Intake 300 ml   Output 975 ml   Net -675 ml           Physical Exam  Constitutional:       Appearance: She is well-developed.   HENT:      Head: Normocephalic and atraumatic.   Eyes:       General: No scleral icterus.     Pupils: Pupils are equal, round, and reactive to light.   Neck:      Vascular: No JVD.   Cardiovascular:      Rate and Rhythm: Normal rate and regular rhythm.      Heart sounds: No murmur heard.    No friction rub. No gallop.   Pulmonary:      Effort: Pulmonary effort is normal. No respiratory distress.      Breath sounds: Normal breath sounds. No wheezing or rales.   Abdominal:      General: Bowel sounds are normal. There is no distension.      Palpations: Abdomen is soft.      Tenderness: There is no abdominal tenderness. There is no guarding or rebound.      Comments: Ileostomy tube with normal output   Genitourinary:     Comments: Left nephrostomy tube with normal output   Musculoskeletal:         General: No deformity. Normal range of motion.      Cervical back: Neck supple.   Lymphadenopathy:      Cervical: No cervical adenopathy.   Skin:     General: Skin is warm and dry.      Capillary Refill: Capillary refill takes less than 2 seconds.      Findings: No erythema or rash.   Neurological:      Mental Status: She is alert and oriented to person, place, and time.      Cranial Nerves: No cranial nerve deficit.      Sensory: No sensory deficit.   Psychiatric:         Mood and Affect: Mood normal.           Significant Labs: A1C: No results for input(s): HGBA1C in the last 4320 hours.  Blood Culture:   No results for input(s): LABBLOO in the last 48 hours.    CBC:   Recent Labs   Lab 06/09/23  0413 06/10/23  0814   WBC 7.17 6.38   HGB 10.3* 11.6*   HCT 33.1* 39.1    309       CMP:   Recent Labs   Lab 06/09/23  0413 06/10/23  0814    144   K 3.9 4.1   * 113*   CO2 20* 23    109   BUN 18 15   CREATININE 1.0 1.3   CALCIUM 9.1 9.7   ANIONGAP 7* 8     No results for input(s): COLORU, APPEARANCEUA, PHUR, SPECGRAV, PROTEINUA, GLUCUA, KETONESU, BILIRUBINUA, OCCULTUA, NITRITE, UROBILINOGEN, LEUKOCYTESUR, RBCUA, WBCUA, BACTERIA, SQUAMEPITHEL, HYALINECASTS in the  last 48 hours.    Invalid input(s): [facility]      Microbiology Results (last 7 days)       Procedure Component Value Units Date/Time    Stool culture [[id number]] Collected: 06/05/23 1335    Order Status: Completed Specimen: Stool Updated: 06/07/23 1541     Stool Culture No Salmonella,Shigella,Vibrio,Campylobacter,Yersinia isolated.    Clostridium difficile EIA [[id number]] Collected: 06/05/23 1335    Order Status: Completed Specimen: Stool Updated: 06/05/23 2114     C. diff Antigen Negative     C difficile Toxins A+B, EIA Negative     Comment: Testing not recommended for children <24 months old.       E. coli 0157 antigen [[id number]] Collected: 06/05/23 1335    Order Status: Canceled Specimen: Stool     Blood culture x two cultures. Draw prior to antibiotics. [[id number]] Collected: 05/30/23 1845    Order Status: Completed Specimen: Blood from Peripheral, Wrist, Right Updated: 06/04/23 2022     Blood Culture, Routine No growth after 5 days.    Narrative:      Aerobic and anaerobic    Blood culture x two cultures. Draw prior to antibiotics. [[id number]] Collected: 05/30/23 1900    Order Status: Completed Specimen: Blood from Peripheral, Wrist, Left Updated: 06/04/23 2022     Blood Culture, Routine No growth after 5 days.    Narrative:      Aerobic and anaerobic              Significant Imaging: I have reviewed all pertinent imaging results/findings within the past 24 hours.

## 2023-06-10 NOTE — ASSESSMENT & PLAN NOTE
Bilateral flank pain  - Recurrent Urinary tact infection related to nephrostomy tube and chronic uretral strictures  - Follow up urine cultures and blood cultures  - Urine culture 4/10 growing klebsiella and enterococcus.  - followup urine cultures   - prn percocet and prn IV morphine   - ID consulted. apprec recs  -- urine cx grew Enterobacter   -- changed meropenem to ertapenem to complete 10 day course. Antibiotic course completed on 6/9/2023.

## 2023-06-10 NOTE — ASSESSMENT & PLAN NOTE
Patient has persistent depression which is severe and is currently uncontrolled. Will Increase anti-depressant medications. We will consult psychiatry at this time. Patient does not display psychosis at this time. Continue to monitor closely and adjust plan of care as needed.  - patient's daughter committed suicide recently   - psychiatry and psychology following, apprec recs  - remeron 30 mg nightly   - coordinating with  to plan patient's return to [facility]

## 2023-06-11 PROBLEM — R11.14 BILIOUS VOMITING WITH NAUSEA: Status: ACTIVE | Noted: 2023-06-11

## 2023-06-11 LAB
ANION GAP SERPL CALC-SCNC: 6 MMOL/L (ref 8–16)
BASOPHILS # BLD AUTO: 0.03 K/UL (ref 0–0.2)
BASOPHILS NFR BLD: 0.5 % (ref 0–1.9)
BUN SERPL-MCNC: 13 MG/DL (ref 6–20)
CALCIUM SERPL-MCNC: 9 MG/DL (ref 8.7–10.5)
CHLORIDE SERPL-SCNC: 114 MMOL/L (ref 95–110)
CO2 SERPL-SCNC: 21 MMOL/L (ref 23–29)
CREAT SERPL-MCNC: 1.1 MG/DL (ref 0.5–1.4)
DIFFERENTIAL METHOD: ABNORMAL
EOSINOPHIL # BLD AUTO: 0.2 K/UL (ref 0–0.5)
EOSINOPHIL NFR BLD: 3.4 % (ref 0–8)
ERYTHROCYTE [DISTWIDTH] IN BLOOD BY AUTOMATED COUNT: 15.1 % (ref 11.5–14.5)
EST. GFR  (NO RACE VARIABLE): 57.5 ML/MIN/1.73 M^2
GLUCOSE SERPL-MCNC: 101 MG/DL (ref 70–110)
HCT VFR BLD AUTO: 36 % (ref 37–48.5)
HGB BLD-MCNC: 10.7 G/DL (ref 12–16)
IMM GRANULOCYTES # BLD AUTO: 0.01 K/UL (ref 0–0.04)
IMM GRANULOCYTES NFR BLD AUTO: 0.2 % (ref 0–0.5)
LYMPHOCYTES # BLD AUTO: 1.4 K/UL (ref 1–4.8)
LYMPHOCYTES NFR BLD: 22.4 % (ref 18–48)
MCH RBC QN AUTO: 27.6 PG (ref 27–31)
MCHC RBC AUTO-ENTMCNC: 29.7 G/DL (ref 32–36)
MCV RBC AUTO: 93 FL (ref 82–98)
MONOCYTES # BLD AUTO: 0.8 K/UL (ref 0.3–1)
MONOCYTES NFR BLD: 12.3 % (ref 4–15)
NEUTROPHILS # BLD AUTO: 3.8 K/UL (ref 1.8–7.7)
NEUTROPHILS NFR BLD: 61.2 % (ref 38–73)
NRBC BLD-RTO: 0 /100 WBC
PLATELET # BLD AUTO: 285 K/UL (ref 150–450)
PMV BLD AUTO: 10.1 FL (ref 9.2–12.9)
POCT GLUCOSE: 102 MG/DL (ref 70–110)
POCT GLUCOSE: 133 MG/DL (ref 70–110)
POCT GLUCOSE: 79 MG/DL (ref 70–110)
POCT GLUCOSE: 99 MG/DL (ref 70–110)
POTASSIUM SERPL-SCNC: 4.2 MMOL/L (ref 3.5–5.1)
RBC # BLD AUTO: 3.88 M/UL (ref 4–5.4)
SODIUM SERPL-SCNC: 141 MMOL/L (ref 136–145)
WBC # BLD AUTO: 6.26 K/UL (ref 3.9–12.7)

## 2023-06-11 PROCEDURE — 85025 COMPLETE CBC W/AUTO DIFF WBC: CPT | Performed by: STUDENT IN AN ORGANIZED HEALTH CARE EDUCATION/TRAINING PROGRAM

## 2023-06-11 PROCEDURE — 36415 COLL VENOUS BLD VENIPUNCTURE: CPT | Performed by: STUDENT IN AN ORGANIZED HEALTH CARE EDUCATION/TRAINING PROGRAM

## 2023-06-11 PROCEDURE — 99232 SBSQ HOSP IP/OBS MODERATE 35: CPT | Mod: ,,, | Performed by: HOSPITALIST

## 2023-06-11 PROCEDURE — 11000001 HC ACUTE MED/SURG PRIVATE ROOM

## 2023-06-11 PROCEDURE — 25000003 PHARM REV CODE 250: Performed by: STUDENT IN AN ORGANIZED HEALTH CARE EDUCATION/TRAINING PROGRAM

## 2023-06-11 PROCEDURE — 99232 PR SUBSEQUENT HOSPITAL CARE,LEVL II: ICD-10-PCS | Mod: ,,, | Performed by: HOSPITALIST

## 2023-06-11 PROCEDURE — 25000003 PHARM REV CODE 250: Performed by: INTERNAL MEDICINE

## 2023-06-11 PROCEDURE — 80048 BASIC METABOLIC PNL TOTAL CA: CPT | Performed by: STUDENT IN AN ORGANIZED HEALTH CARE EDUCATION/TRAINING PROGRAM

## 2023-06-11 RX ADMIN — GABAPENTIN 200 MG: 100 CAPSULE ORAL at 08:06

## 2023-06-11 RX ADMIN — MIRTAZAPINE 30 MG: 30 TABLET, FILM COATED ORAL at 08:06

## 2023-06-11 RX ADMIN — TAMSULOSIN HYDROCHLORIDE 0.4 MG: 0.4 CAPSULE ORAL at 09:06

## 2023-06-11 NOTE — SUBJECTIVE & OBJECTIVE
Interval History: see above      Review of Systems   Constitutional:  Positive for activity change (getting up to chair). Negative for chills, fatigue and fever.   HENT:  Negative for congestion and trouble swallowing.    Eyes:  Negative for visual disturbance.   Respiratory:  Negative for cough and shortness of breath.    Cardiovascular:  Negative for chest pain and leg swelling.   Gastrointestinal:  Negative for abdominal distention, abdominal pain, nausea and vomiting.   Endocrine: Negative for cold intolerance, heat intolerance, polydipsia and polyuria.   Genitourinary:  Negative for difficulty urinating, dysuria and flank pain.   Musculoskeletal:  Negative for back pain and myalgias.   Skin:  Negative for rash and wound.   Neurological:  Positive for weakness. Negative for dizziness and light-headedness.   Hematological:  Negative for adenopathy. Does not bruise/bleed easily.   Psychiatric/Behavioral:  Negative for confusion and sleep disturbance.    Objective:     Vital Signs (Most Recent):  Temp: 98.1 °F (36.7 °C) (06/11/23 0801)  Pulse: 73 (06/11/23 0801)  Resp: 16 (06/11/23 0801)  BP: 118/64 (06/11/23 0801)  SpO2: 97 % (06/11/23 0801) Vital Signs (24h Range):  Temp:  [97.1 °F (36.2 °C)-99 °F (37.2 °C)] 98.1 °F (36.7 °C)  Pulse:  [72-84] 73  Resp:  [16-18] 16  SpO2:  [94 %-100 %] 97 %  BP: ()/(50-64) 118/64     Weight: 83 kg (183 lb)  Body mass index is 27.02 kg/m².    Intake/Output Summary (Last 24 hours) at 6/11/2023 0829  Last data filed at 6/11/2023 0606  Gross per 24 hour   Intake 550 ml   Output 1900 ml   Net -1350 ml           Physical Exam  Constitutional:       Appearance: She is well-developed.   HENT:      Head: Normocephalic and atraumatic.   Eyes:      General: No scleral icterus.     Pupils: Pupils are equal, round, and reactive to light.   Neck:      Vascular: No JVD.   Cardiovascular:      Rate and Rhythm: Normal rate and regular rhythm.      Heart sounds: No murmur heard.    No  friction rub. No gallop.   Pulmonary:      Effort: Pulmonary effort is normal. No respiratory distress.      Breath sounds: Normal breath sounds. No wheezing or rales.   Abdominal:      General: Bowel sounds are normal. There is no distension.      Palpations: Abdomen is soft.      Tenderness: There is no abdominal tenderness. There is no guarding or rebound.      Comments: Ileostomy tube with normal output   Genitourinary:     Comments: Left nephrostomy tube with normal output   Musculoskeletal:         General: No deformity. Normal range of motion.      Cervical back: Neck supple.   Lymphadenopathy:      Cervical: No cervical adenopathy.   Skin:     General: Skin is warm and dry.      Capillary Refill: Capillary refill takes less than 2 seconds.      Findings: No erythema or rash.   Neurological:      Mental Status: She is alert and oriented to person, place, and time.      Cranial Nerves: No cranial nerve deficit.      Sensory: No sensory deficit.   Psychiatric:         Mood and Affect: Mood normal.           Significant Labs: A1C: No results for input(s): HGBA1C in the last 4320 hours.  Blood Culture:   No results for input(s): LABBLOO in the last 48 hours.    CBC:   Recent Labs   Lab 06/10/23  0814 06/11/23  0536   WBC 6.38 6.26   HGB 11.6* 10.7*   HCT 39.1 36.0*    285       CMP:   Recent Labs   Lab 06/10/23  0814 06/11/23  0536    141   K 4.1 4.2   * 114*   CO2 23 21*    101   BUN 15 13   CREATININE 1.3 1.1   CALCIUM 9.7 9.0   ANIONGAP 8 6*         Significant Imaging: I have reviewed all pertinent imaging results/findings within the past 24 hours.

## 2023-06-11 NOTE — ASSESSMENT & PLAN NOTE
- S/p Transverse colon conduit 2020 complicate by bowel perforation requiring hemicolectomy & ileostomy  - increased watery stool from ileostomy on 6/5. Stool studies negative.   - started imodium prn  - wound care evaluated the patient and provided ostomy supplies.  - Follow up outpatient CRS    6/1- needs routine ostomy care

## 2023-06-11 NOTE — ASSESSMENT & PLAN NOTE
Bilateral flank pain  - Recurrent Urinary tact infection related to nephrostomy tube and chronic uretral strictures  - Follow up urine cultures and blood cultures  - Urine culture 4/10 growing klebsiella and enterococcus.  - followup urine cultures   - prn percocet and prn IV morphine   - ID consulted. apprec recs  -- urine cx grew Enterobacter   -- changed meropenem to ertapenem to complete 10 day course. Antibiotic course completed on 6/9/2023.    COMPLETED ANTIBIOTICS

## 2023-06-11 NOTE — PROGRESS NOTES
[facility]  Progress Note    Patient Name: Edita Riley  MRN: [id number]  Patient Class: IP- Inpatient   Admission Date: 5/30/2023  Length of Stay: 6 days  Attending Physician: Dioni Brush MD  Primary Care Provider: Primary Doctor No        Subjective:     Principal Problem:UTI (urinary tract infection)        HPI:  58 year old female with Hx of distal ureteral strictures due to XRT for cervical cancer s/p transverse colon conduit and B/L nephrostomy tubes complicated by MDR infections who presents to the ED for UTI. Patient was discharged last month after presenting with recurrent UTI (E. Faecium VRE). History limited form patient. She had undergone right PCN drain removal and stone extraction with drain replacement. Completed a course of Ertapenem 1 g IV daily for 10 days and Linezolid and was discharged on Bactrim after ID evaluation. Patient denies any fever, chills, nausea, vomiting, hematemesis, cough, chest pain, palpitations, shortness of breath, abdominal pain/distension, changes in bowel or urinary habits, no hematochezia or melena.      Overview/Hospital Course:  Per urology, given good renal function, no indication for inpatient placement of right nephrostomy tube for the moderate right hyrdrouteronephrosis. Per ID, switched to ertapenem based on urine culture which grew Enterobacter and will complete 10 day total course. Midline consult placed. Psychiatry following and remeron increased to 30 mg nightly. Psychology following and SW obtaining records from [facility] in [place] for med list.    6/4-  ertapenem, EOC 6/9/23.  VSS.  Midline placed. She is Med Ready for discharge. PEC and CEC in place. CM working with [facility].    6/5- Tolerating IV ertapenem well but does note more liquid stool from colostomy. Stool studies pending. She is on Remeron 30 mg nightly and remains PEC'd given her grade disability in managing her own care given her  depression and severe bereavement. Given her h/o MDR UTI requiring IV ertapenem at this time, ureteral strictures s/p nephrostomy tube placement and need for repeat nephrostogram (scheduled for 6/6), need to wound care evaluation for colostomy care and refill of outpatient supplies and her grave disability due to MDD and severe bereavement, she requires continued inpatient care to ensure she has a safe discharge to the appropriate psychiatric facility that will have the resources to care for her. She currently lacks the ability to care for herself at home given the complexity of her medical care and her current apathy and grade disability. Arranging for psychiatry facility placement after nephrostogram and stool study results.     6/7- Patient sitting in bed, no acute distress. No acute events overnight. Patient reports left flank pain better controlled. Tolerating IV ertapenem well and will be completed on 6/9/2023. Remains PEC'd for grave disability due to SI 2/2 depression and grief but per psychiatry, suicidal ideations are improving. Left antegrade nephrogram demonstrated continued ureteral obstruction so she underwent exchange of a percutaneous 12 Fr nephrostomy tube today. PT/OT ordered due to functional decline during this hospitalization.     6/8- Nephrostomy tubes pulled per Urology. No back pain. Ostomy working well.  /56. Completes ertapenem tomorrow on 6/9. CM working on dc plan. Will likely need to go home.  is considering LTC- NH in the future.     6/9 - 6/10: Per psychiatry recommendations, primary team rescinded PEC/CEC because pt is no longer in any imminent danger of hurting self or others and not gravely disabled. Pt currently does not meet criteria nor benefit from from involuntary inpatient psychiatric admission. Psychiatry agrees with voluntary admission to psychiatric facility for continued management of depression. Patient completed a 10 day course of IV antibiotics on 6/9/2023.  She has remained stable. Kidney function back to normal. Hemodynamically stable. Mental and functional status at baseline. Pain around left nephrostomy tube controlled after the tube exchange by IR. No complaints at this time. Reports good oral intake and regular output from ileostomy. Since [facility] declined approval for readmission due to high medical needs, primary team discuss discharge planning with psychiatry and social work tomorrow.    6/11- stable, discharge planning with psychiatry and social work. [facility] ( Med ready for dc)      Interval History: see above      Review of Systems   Constitutional:  Positive for activity change (getting up to chair). Negative for chills, fatigue and fever.   HENT:  Negative for congestion and trouble swallowing.    Eyes:  Negative for visual disturbance.   Respiratory:  Negative for cough and shortness of breath.    Cardiovascular:  Negative for chest pain and leg swelling.   Gastrointestinal:  Negative for abdominal distention, abdominal pain, nausea and vomiting.   Endocrine: Negative for cold intolerance, heat intolerance, polydipsia and polyuria.   Genitourinary:  Negative for difficulty urinating, dysuria and flank pain.   Musculoskeletal:  Negative for back pain and myalgias.   Skin:  Negative for rash and wound.   Neurological:  Positive for weakness. Negative for dizziness and light-headedness.   Hematological:  Negative for adenopathy. Does not bruise/bleed easily.   Psychiatric/Behavioral:  Negative for confusion and sleep disturbance.    Objective:     Vital Signs (Most Recent):  Temp: 98.1 °F (36.7 °C) (06/11/23 0801)  Pulse: 73 (06/11/23 0801)  Resp: 16 (06/11/23 0801)  BP: 118/64 (06/11/23 0801)  SpO2: 97 % (06/11/23 0801) Vital Signs (24h Range):  Temp:  [97.1 °F (36.2 °C)-99 °F (37.2 °C)] 98.1 °F (36.7 °C)  Pulse:  [72-84] 73  Resp:  [16-18] 16  SpO2:  [94 %-100 %] 97 %  BP: ()/(50-64) 118/64     Weight: 83 kg (183 lb)  Body mass index is 27.02  kg/m².    Intake/Output Summary (Last 24 hours) at 6/11/2023 0829  Last data filed at 6/11/2023 0606  Gross per 24 hour   Intake 550 ml   Output 1900 ml   Net -1350 ml           Physical Exam  Constitutional:       Appearance: She is well-developed.   HENT:      Head: Normocephalic and atraumatic.   Eyes:      General: No scleral icterus.     Pupils: Pupils are equal, round, and reactive to light.   Neck:      Vascular: No JVD.   Cardiovascular:      Rate and Rhythm: Normal rate and regular rhythm.      Heart sounds: No murmur heard.    No friction rub. No gallop.   Pulmonary:      Effort: Pulmonary effort is normal. No respiratory distress.      Breath sounds: Normal breath sounds. No wheezing or rales.   Abdominal:      General: Bowel sounds are normal. There is no distension.      Palpations: Abdomen is soft.      Tenderness: There is no abdominal tenderness. There is no guarding or rebound.      Comments: Ileostomy tube with normal output   Genitourinary:     Comments: Left nephrostomy tube with normal output   Musculoskeletal:         General: No deformity. Normal range of motion.      Cervical back: Neck supple.   Lymphadenopathy:      Cervical: No cervical adenopathy.   Skin:     General: Skin is warm and dry.      Capillary Refill: Capillary refill takes less than 2 seconds.      Findings: No erythema or rash.   Neurological:      Mental Status: She is alert and oriented to person, place, and time.      Cranial Nerves: No cranial nerve deficit.      Sensory: No sensory deficit.   Psychiatric:         Mood and Affect: Mood normal.           Significant Labs: A1C: No results for input(s): HGBA1C in the last 4320 hours.  Blood Culture:   No results for input(s): LABBLOO in the last 48 hours.    CBC:   Recent Labs   Lab 06/10/23  0814 06/11/23 0536   WBC 6.38 6.26   HGB 11.6* 10.7*   HCT 39.1 36.0*    285       CMP:   Recent Labs   Lab 06/10/23  0814 06/11/23 0536    141   K 4.1 4.2   * 114*    CO2 23 21*    101   BUN 15 13   CREATININE 1.3 1.1   CALCIUM 9.7 9.0   ANIONGAP 8 6*         Significant Imaging: I have reviewed all pertinent imaging results/findings within the past 24 hours.      Assessment/Plan:      * UTI (urinary tract infection)  Bilateral flank pain  - Recurrent Urinary tact infection related to nephrostomy tube and chronic uretral strictures  - Follow up urine cultures and blood cultures  - Urine culture 4/10 growing klebsiella and enterococcus.  - followup urine cultures   - prn percocet and prn IV morphine   - ID consulted. apprec recs  -- urine cx grew Enterobacter   -- changed meropenem to ertapenem to complete 10 day course. Antibiotic course completed on 6/9/2023.    COMPLETED ANTIBIOTICS    ODESSA (acute kidney injury)  ODESSA on CKD  - Scr on admit, 1.7 --> 1.3   - Monitor renal function  - Avoid Nephrotoxic agents  - Monitor urine output   - RESOVLED     Ileostomy in place  - S/p Transverse colon conduit 2020 complicate by bowel perforation requiring hemicolectomy & ileostomy  - increased watery stool from ileostomy on 6/5. Stool studies negative.   - started imodium prn  - wound care evaluated the patient and provided ostomy supplies.  - Follow up outpatient CRS    6/1- needs routine ostomy care    Nephrostomy status  - S/p Bilateral Nephrostomy tubes; R. Removed after recent complication and remains with L.Nephrostomy  - Continue with Flomax 0.4 mg, daily  - urology following.   -- s/p nephrogram on 6/6/23 which showed continued ureteral obstruction. Exchange of a percutaneous 12 Fr nephrostomy tube without complication on 6/7/2023 by IR  - outpatient urology follow up    6/11- nephrostomy tubes removed      Major depressive disorder, recurrent episode, moderate  Patient has persistent depression which is severe and is currently uncontrolled. Will Increase anti-depressant medications. We will consult psychiatry at this time. Patient does not display psychosis at this time.  Continue to monitor closely and adjust plan of care as needed.  - patient's daughter committed suicide recently   - psychiatry and psychology following, apprec recs  - remeron 30 mg nightly   - coordinating with SW and will consulted psychiatry on 6/11 to assist with discharge planning since patient was declined by [facility]  6/11- need psych f/u      Discharge planning issues  - UTI treatment was completed with IV ertapenem   - C diff negative so isolation discontinued   - per psychiatry, patient no longer an imminent threat to herself or others and is not gravely disabled so PEC was rescinded. Since [facility] declined approval for readmission due to high medical needs, primary team discuss discharge planning with psychiatry and social work tomorrow.  - PT/OT recommend home. No post acute care needs for PT/OT    Physical deconditioning  - patient notes decline in functional status since admission.   - PT/OT recommend home. No post acute care PT/OT needs upon discharge.        VTE Risk Mitigation (From admission, onward)         Ordered     IP VTE HIGH RISK PATIENT  Once         05/30/23 2228     Place sequential compression device  Until discontinued         05/30/23 2228                Discharge Planning   OK: 6/10/2023     Code Status: Full Code   Is the patient medically ready for discharge?: Yes    Reason for patient still in hospital (select all that apply): Pending disposition  Discharge Plan A: Psychiatric hospital   Discharge Delays: None known at this time      Mell Adhikari MD  Department of Hospital Medicine   [facility] - Med Surg

## 2023-06-11 NOTE — ASSESSMENT & PLAN NOTE
- S/p Bilateral Nephrostomy tubes; R. Removed after recent complication and remains with L.Nephrostomy  - Continue with Flomax 0.4 mg, daily  - urology following.   -- s/p nephrogram on 6/6/23 which showed continued ureteral obstruction. Exchange of a percutaneous 12 Fr nephrostomy tube without complication on 6/7/2023 by IR  - outpatient urology follow up    6/11- nephrostomy tubes removed

## 2023-06-11 NOTE — PLAN OF CARE
EDWARDO spoke with pt at length. Discussed discharge planning and denial from [facility]. Pt a bit frustrated that [facility] has denied her. SW inquired about why she still felt as though she needed inpatient psych. Pt reported that her twin dtr committed suicide in her apartment on 5/11/2023 and she has not been back in the apartment since it happened. Pt is afraid to go home because she does not think she will be able to mentally handle being back there. Pt also disclosed that her other twin dtr committed suicide 5 years ago and she is having a tough time processing the death of both her children.     Pt reported that if she has to, she will go home but again does not know how she will do since she has not been there since dtr's death. Her first plan is to go back to inpatient psych but if she does not meet criteria, would absolutely like to be setup with outpatient psychiatric follow-up. Pt is interested in speaking with people and hearing experiences of other individuals who have lost children.     Pt's  actively searching for a new apartment for them to live in. EDWARDO offered support to pt. Encouraged her to try to prepare herself for if she absolutely has to go back to her apartment.     EDWARDO/LAURA to follow and will speak to pt again after psych recs.     Gissel Cordero, PENNY, LCSW  [place] [place] [phone]

## 2023-06-11 NOTE — PLAN OF CARE
Problem: Adult Inpatient Plan of Care  Goal: Plan of Care Review  Outcome: Ongoing, Progressing  Goal: Patient-Specific Goal (Individualized)  Outcome: Ongoing, Progressing  Goal: Absence of Hospital-Acquired Illness or Injury  Outcome: Ongoing, Progressing  Goal: Optimal Comfort and Wellbeing  Outcome: Ongoing, Progressing     Problem: Fluid and Electrolyte Imbalance (Acute Kidney Injury/Impairment)  Goal: Fluid and Electrolyte Balance  Outcome: Ongoing, Progressing       Patient is AAOx4, VSS. Ileostomy, nephrostomy and urostomy care done, well tolerated by the patient. Fall prevention protocol maintained. Attended all patient's needs.

## 2023-06-11 NOTE — ASSESSMENT & PLAN NOTE
Patient has persistent depression which is severe and is currently uncontrolled. Will Increase anti-depressant medications. We will consult psychiatry at this time. Patient does not display psychosis at this time. Continue to monitor closely and adjust plan of care as needed.  - patient's daughter committed suicide recently   - psychiatry and psychology following, apprec recs  - remeron 30 mg nightly   - coordinating with SW and will consulted psychiatry on 6/11 to assist with discharge planning [facility]  6/11- need psych f/u

## 2023-06-12 VITALS
BODY MASS INDEX: 27.11 KG/M2 | SYSTOLIC BLOOD PRESSURE: 115 MMHG | HEIGHT: 69 IN | OXYGEN SATURATION: 96 % | DIASTOLIC BLOOD PRESSURE: 61 MMHG | HEART RATE: 75 BPM | TEMPERATURE: 98 F | WEIGHT: 183 LBS | RESPIRATION RATE: 15 BRPM

## 2023-06-12 LAB
ANION GAP SERPL CALC-SCNC: 8 MMOL/L (ref 8–16)
BASOPHILS # BLD AUTO: 0.05 K/UL (ref 0–0.2)
BASOPHILS NFR BLD: 0.8 % (ref 0–1.9)
BUN SERPL-MCNC: 15 MG/DL (ref 6–20)
CALCIUM SERPL-MCNC: 9.3 MG/DL (ref 8.7–10.5)
CHLORIDE SERPL-SCNC: 114 MMOL/L (ref 95–110)
CO2 SERPL-SCNC: 20 MMOL/L (ref 23–29)
CREAT SERPL-MCNC: 1.2 MG/DL (ref 0.5–1.4)
DIFFERENTIAL METHOD: ABNORMAL
EOSINOPHIL # BLD AUTO: 0.2 K/UL (ref 0–0.5)
EOSINOPHIL NFR BLD: 3.6 % (ref 0–8)
ERYTHROCYTE [DISTWIDTH] IN BLOOD BY AUTOMATED COUNT: 14.9 % (ref 11.5–14.5)
EST. GFR  (NO RACE VARIABLE): 51.8 ML/MIN/1.73 M^2
GLUCOSE SERPL-MCNC: 77 MG/DL (ref 70–110)
HCT VFR BLD AUTO: 34.7 % (ref 37–48.5)
HGB BLD-MCNC: 10.5 G/DL (ref 12–16)
IMM GRANULOCYTES # BLD AUTO: 0.01 K/UL (ref 0–0.04)
IMM GRANULOCYTES NFR BLD AUTO: 0.2 % (ref 0–0.5)
LYMPHOCYTES # BLD AUTO: 1.7 K/UL (ref 1–4.8)
LYMPHOCYTES NFR BLD: 26.4 % (ref 18–48)
MCH RBC QN AUTO: 27.1 PG (ref 27–31)
MCHC RBC AUTO-ENTMCNC: 30.3 G/DL (ref 32–36)
MCV RBC AUTO: 89 FL (ref 82–98)
MONOCYTES # BLD AUTO: 0.7 K/UL (ref 0.3–1)
MONOCYTES NFR BLD: 11.5 % (ref 4–15)
NEUTROPHILS # BLD AUTO: 3.7 K/UL (ref 1.8–7.7)
NEUTROPHILS NFR BLD: 57.5 % (ref 38–73)
NRBC BLD-RTO: 0 /100 WBC
PLATELET # BLD AUTO: 290 K/UL (ref 150–450)
PMV BLD AUTO: 10.2 FL (ref 9.2–12.9)
POCT GLUCOSE: 87 MG/DL (ref 70–110)
POCT GLUCOSE: 98 MG/DL (ref 70–110)
POTASSIUM SERPL-SCNC: 4 MMOL/L (ref 3.5–5.1)
RBC # BLD AUTO: 3.88 M/UL (ref 4–5.4)
SODIUM SERPL-SCNC: 142 MMOL/L (ref 136–145)
WBC # BLD AUTO: 6.36 K/UL (ref 3.9–12.7)

## 2023-06-12 PROCEDURE — 25000003 PHARM REV CODE 250: Performed by: INTERNAL MEDICINE

## 2023-06-12 PROCEDURE — 99239 PR HOSPITAL DISCHARGE DAY,>30 MIN: ICD-10-PCS | Mod: ,,, | Performed by: HOSPITALIST

## 2023-06-12 PROCEDURE — 25000003 PHARM REV CODE 250: Performed by: STUDENT IN AN ORGANIZED HEALTH CARE EDUCATION/TRAINING PROGRAM

## 2023-06-12 PROCEDURE — 99239 HOSP IP/OBS DSCHRG MGMT >30: CPT | Mod: ,,, | Performed by: HOSPITALIST

## 2023-06-12 PROCEDURE — 97530 THERAPEUTIC ACTIVITIES: CPT | Mod: CQ

## 2023-06-12 PROCEDURE — 80048 BASIC METABOLIC PNL TOTAL CA: CPT | Performed by: STUDENT IN AN ORGANIZED HEALTH CARE EDUCATION/TRAINING PROGRAM

## 2023-06-12 PROCEDURE — 36415 COLL VENOUS BLD VENIPUNCTURE: CPT | Performed by: STUDENT IN AN ORGANIZED HEALTH CARE EDUCATION/TRAINING PROGRAM

## 2023-06-12 PROCEDURE — 85025 COMPLETE CBC W/AUTO DIFF WBC: CPT | Performed by: STUDENT IN AN ORGANIZED HEALTH CARE EDUCATION/TRAINING PROGRAM

## 2023-06-12 RX ADMIN — TAMSULOSIN HYDROCHLORIDE 0.4 MG: 0.4 CAPSULE ORAL at 09:06

## 2023-06-12 RX ADMIN — MIRTAZAPINE 30 MG: 30 TABLET, FILM COATED ORAL at 08:06

## 2023-06-12 RX ADMIN — GABAPENTIN 200 MG: 100 CAPSULE ORAL at 08:06

## 2023-06-12 NOTE — PROGRESS NOTES
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 Patient was discharged last month after presenting with recurrent UTI (E. Faecium VRE). History limited form patient. She had undergone right PCN drain removal and stone extraction with drain replacement. Completed a course of Ertapenem 1 g IV daily for 10 days and Linezolid and was discharged on Bactrim after ID evaluation. Patient denies any fever, chills, nausea, vomiting, hematemesis, cough, chest pain, palpitations, shortness of breath, abdominal pain/distension, changes in bowel or urinary habits, no hematochezia or melena.      Overview/Hospital Course:  Per urology, given good renal function, no indication for inpatient placement of right nephrostomy tube for the moderate right hyrdrouteronephrosis. Per ID, switched to ertapenem based on urine culture which grew Enterobacter and will complete 10 day total course. Midline consult placed. Psychiatry following and remeron increased to 30 mg nightly. Psychology following and SW obtaining records from [facility] in [place] for med list.    6/4-  ertapenem, EOC 6/9/23.  VSS.  Midline placed. She is Med Ready for discharge. PEC and CEC in place. CM working with [facility].    6/5- Tolerating IV ertapenem well but does note more liquid stool from colostomy. Stool studies pending. She is on Remeron 30 mg nightly and remains PEC'd given her grade disability in managing her own care given her  depression and severe bereavement. Given her h/o MDR UTI requiring IV ertapenem at this time, ureteral strictures s/p nephrostomy tube placement and need for repeat nephrostogram (scheduled for 6/6), need to wound care evaluation for colostomy care and refill of outpatient supplies and her grave disability due to MDD and severe bereavement, she requires continued inpatient care to ensure she has a safe discharge to the appropriate psychiatric facility that will have the resources to care for her. She currently lacks the ability to care for herself at home given the complexity of her medical care and her current apathy and grade disability. Arranging for psychiatry facility placement after nephrostogram and stool study results.     6/7- Patient sitting in bed, no acute distress. No acute events overnight. Patient reports left flank pain better controlled. Tolerating IV ertapenem well and will be completed on 6/9/2023. Remains PEC'd for grave disability due to SI 2/2 depression and grief but per psychiatry, suicidal ideations are improving. Left antegrade nephrogram demonstrated continued ureteral obstruction so she underwent exchange of a percutaneous 12 Fr nephrostomy tube today. PT/OT ordered due to functional decline during this hospitalization.     6/8- Nephrostomy tubes pulled per Urology. No back pain. Ostomy working well.  /56. Completes ertapenem tomorrow on 6/9. CM working on dc plan. Will likely need to go home.  is considering LTC- NH in the future.     6/9 - 6/10: Per psychiatry recommendations, primary team rescinded PEC/CEC because pt is no longer in any imminent danger of hurting self or others and not gravely disabled. Pt currently does not meet criteria nor benefit from from involuntary inpatient psychiatric admission. Psychiatry agrees with voluntary admission to psychiatric facility for continued management of depression. Patient completed a 10 day course of IV antibiotics on 6/9/2023.  She has remained stable. Kidney function back to normal. Hemodynamically stable. Mental and functional status at baseline. Pain around left nephrostomy tube controlled after the tube exchange by IR. No complaints at this time. Reports good oral intake and regular output from ileostomy. Since [facility] declined approval for readmission due to high medical needs, primary team discuss discharge planning with psychiatry and social work tomorrow.  6/11- stable, discharge planning with psychiatry and social work. [facility] (Med ready for dc)    6/12- all is stable.  Called Hammad Riley, her . Left voice mail message. Pt tells me he is looking for a new place to live. CM still looking for inpatient or outpt psych  program.   Pt's usband is prepared to accept pt. She is in agreement for going home to same place.  He has a new apartment lined up in July in same complex. Will offer  psych f/u, but  doesn't think she will agree to someone coming to the house. outpt psych appointment and plan for therapy if she will agree      Interval History: see above      Review of Systems   Constitutional:  Positive for activity change (getting up to chair). Negative for chills, fatigue and fever.   HENT:  Negative for congestion and trouble swallowing.    Eyes:  Negative for visual disturbance.   Respiratory:  Negative for cough and shortness of breath.    Cardiovascular:  Negative for chest pain and leg swelling.   Gastrointestinal:  Negative for abdominal distention, abdominal pain, nausea and vomiting.   Endocrine: Negative for cold intolerance, heat intolerance, polydipsia and polyuria.   Genitourinary:  Negative for difficulty urinating, dysuria and flank pain.   Musculoskeletal:  Negative for back pain and myalgias.   Skin:  Negative for rash and wound.   Neurological:  Positive for weakness. Negative for dizziness and light-headedness.   Hematological:  Negative for adenopathy. Does not bruise/bleed easily.    Psychiatric/Behavioral:  Negative for confusion and sleep disturbance.    Objective:     Vital Signs (Most Recent):  Temp: 97.8 °F (36.6 °C) (06/12/23 1055)  Pulse: 79 (06/12/23 1055)  Resp: 12 (06/12/23 0719)  BP: (!) 115/58 (06/12/23 1055)  SpO2: 95 % (06/12/23 1055) Vital Signs (24h Range):  Temp:  [96.7 °F (35.9 °C)-98.7 °F (37.1 °C)] 97.8 °F (36.6 °C)  Pulse:  [65-87] 79  Resp:  [12-18] 12  SpO2:  [95 %-99 %] 95 %  BP: (105-119)/(58-73) 115/58     Weight: 83 kg (183 lb)  Body mass index is 27.02 kg/m².    Intake/Output Summary (Last 24 hours) at 6/12/2023 1409  Last data filed at 6/12/2023 0530  Gross per 24 hour   Intake 250 ml   Output 1175 ml   Net -925 ml         Physical Exam  Constitutional:       Appearance: She is well-developed.   HENT:      Head: Normocephalic and atraumatic.   Eyes:      General: No scleral icterus.     Pupils: Pupils are equal, round, and reactive to light.   Neck:      Vascular: No JVD.   Cardiovascular:      Rate and Rhythm: Normal rate and regular rhythm.      Heart sounds: No murmur heard.    No friction rub. No gallop.   Pulmonary:      Effort: Pulmonary effort is normal. No respiratory distress.      Breath sounds: Normal breath sounds. No wheezing or rales.   Abdominal:      General: Bowel sounds are normal. There is no distension.      Palpations: Abdomen is soft.      Tenderness: There is no abdominal tenderness. There is no guarding or rebound.      Comments: Ileostomy tube with normal output   Genitourinary:     Comments: Left nephrostomy tube with normal output   Musculoskeletal:         General: No deformity. Normal range of motion.      Cervical back: Neck supple.   Lymphadenopathy:      Cervical: No cervical adenopathy.   Skin:     General: Skin is warm and dry.      Capillary Refill: Capillary refill takes less than 2 seconds.      Findings: No erythema or rash.   Neurological:      Mental Status: She is alert and oriented to person, place, and time.      Cranial  Nerves: No cranial nerve deficit.      Sensory: No sensory deficit.   Psychiatric:         Mood and Affect: Mood normal.      No results for input(s): LABBLOO in the last 48 hours.    CBC:   Recent Labs   Lab 06/11/23  0536 06/12/23  0547   WBC 6.26 6.36   HGB 10.7* 10.5*   HCT 36.0* 34.7*    290     CMP:   Recent Labs   Lab 06/11/23  0536 06/12/23  0547    142   K 4.2 4.0   * 114*   CO2 21* 20*    77   BUN 13 15   CREATININE 1.1 1.2   CALCIUM 9.0 9.3   ANIONGAP 6* 8       Significant Imaging: I have reviewed all pertinent imaging results/findings within the past 24 hours.      Assessment/Plan:      * UTI (urinary tract infection)  Bilateral flank pain  - Recurrent Urinary tact infection related to nephrostomy tube and chronic uretral strictures  - Follow up urine cultures and blood cultures  - Urine culture 4/10 growing klebsiella and enterococcus.  - followup urine cultures   - prn percocet and prn IV morphine   - ID consulted. apprec recs  -- urine cx grew Enterobacter   -- changed meropenem to ertapenem to complete 10 day course. Antibiotic course completed on 6/9/2023.    COMPLETED ANTIBIOTICS    ODESSA (acute kidney injury)  ODESSA on CKD  - Scr on admit, 1.7 --> 1.3   - Monitor renal function  - Avoid Nephrotoxic agents  - Monitor urine output   - RESOVLED   - good UO through nephrostomy tube, and urostomy    Ileostomy in place  - S/p Transverse colon conduit 2020 complicate by bowel perforation requiring hemicolectomy & ileostomy  - increased watery stool from ileostomy on 6/5. Stool studies negative.   - started imodium prn  - wound care evaluated the patient and provided ostomy supplies.  - Follow up outpatient CRS    6/12- needs routine ostomy care    Nephrostomy status  - S/p Bilateral Nephrostomy tubes; R. Removed after recent complication and remains with L.Nephrostomy  - Continue with Flomax 0.4 mg, daily  - urology following.   -- s/p nephrogram on 6/6/23 which showed continued  ureteral obstruction. Exchange of a percutaneous 12 Fr nephrostomy tube without complication on 6/7/2023 by IR  - outpatient urology follow up    6/12- nephrostomy tube is present, urostomy and ileostomy    Major depressive disorder, recurrent episode, moderate  Patient has persistent depression which is severe and is currently uncontrolled. Will Increase anti-depressant medications. We will consult psychiatry at this time. Patient does not display psychosis at this time. Continue to monitor closely and adjust plan of care as needed.  - patient's daughter committed suicide recently   - psychiatry and psychology following, apprec recs  - remeron 30 mg nightly   - coordinating with SW and will consulted psychiatry on 6/11 to assist with discharge planning since patient was declined by [facility]  6/11- need psych f/u      Bilious vomiting with nausea  resolved      Discharge planning issues  - UTI treatment was completed with IV ertapenem   - C diff negative so isolation discontinued   - per psychiatry, patient no longer an imminent threat to herself or others and is not gravely disabled so PEC was rescinded. Since [facility] declined approval for readmission due to high medical needs, primary team discuss discharge planning with psychiatry and social work tomorrow.  - PT/OT recommend home. No post acute care needs for PT/OT    Physical deconditioning  - patient notes decline in functional status since admission.   - PT/OT recommend home. No post acute care PT/OT needs upon discharge.      Pyelonephritis  resolved      VTE Risk Mitigation (From admission, onward)         Ordered     IP VTE HIGH RISK PATIENT  Once         05/30/23 2228     Place sequential compression device  Until discontinued         05/30/23 2228                Discharge Planning   OK: 6/12/2023     Code Status: Full Code   Is the patient medically ready for discharge?: Yes    Reason for patient still in hospital (select all that apply): Pending  disposition  Discharge Plan A: Psychiatric hospital   Discharge Delays: (!) Patient and Family Barriers      Mell Adhikari MD  Department of Hospital Medicine   [facility]

## 2023-06-12 NOTE — SUBJECTIVE & OBJECTIVE
Interval History: see above      Review of Systems   Constitutional:  Positive for activity change (getting up to chair). Negative for chills, fatigue and fever.   HENT:  Negative for congestion and trouble swallowing.    Eyes:  Negative for visual disturbance.   Respiratory:  Negative for cough and shortness of breath.    Cardiovascular:  Negative for chest pain and leg swelling.   Gastrointestinal:  Negative for abdominal distention, abdominal pain, nausea and vomiting.   Endocrine: Negative for cold intolerance, heat intolerance, polydipsia and polyuria.   Genitourinary:  Negative for difficulty urinating, dysuria and flank pain.   Musculoskeletal:  Negative for back pain and myalgias.   Skin:  Negative for rash and wound.   Neurological:  Positive for weakness. Negative for dizziness and light-headedness.   Hematological:  Negative for adenopathy. Does not bruise/bleed easily.   Psychiatric/Behavioral:  Negative for confusion and sleep disturbance.    Objective:     Vital Signs (Most Recent):  Temp: 97.8 °F (36.6 °C) (06/12/23 1055)  Pulse: 79 (06/12/23 1055)  Resp: 12 (06/12/23 0719)  BP: (!) 115/58 (06/12/23 1055)  SpO2: 95 % (06/12/23 1055) Vital Signs (24h Range):  Temp:  [96.7 °F (35.9 °C)-98.7 °F (37.1 °C)] 97.8 °F (36.6 °C)  Pulse:  [65-87] 79  Resp:  [12-18] 12  SpO2:  [95 %-99 %] 95 %  BP: (105-119)/(58-73) 115/58     Weight: 83 kg (183 lb)  Body mass index is 27.02 kg/m².    Intake/Output Summary (Last 24 hours) at 6/12/2023 1409  Last data filed at 6/12/2023 0530  Gross per 24 hour   Intake 250 ml   Output 1175 ml   Net -925 ml         Physical Exam  Constitutional:       Appearance: She is well-developed.   HENT:      Head: Normocephalic and atraumatic.   Eyes:      General: No scleral icterus.     Pupils: Pupils are equal, round, and reactive to light.   Neck:      Vascular: No JVD.   Cardiovascular:      Rate and Rhythm: Normal rate and regular rhythm.      Heart sounds: No murmur heard.    No  friction rub. No gallop.   Pulmonary:      Effort: Pulmonary effort is normal. No respiratory distress.      Breath sounds: Normal breath sounds. No wheezing or rales.   Abdominal:      General: Bowel sounds are normal. There is no distension.      Palpations: Abdomen is soft.      Tenderness: There is no abdominal tenderness. There is no guarding or rebound.      Comments: Ileostomy tube with normal output   Genitourinary:     Comments: Left nephrostomy tube with normal output   Musculoskeletal:         General: No deformity. Normal range of motion.      Cervical back: Neck supple.   Lymphadenopathy:      Cervical: No cervical adenopathy.   Skin:     General: Skin is warm and dry.      Capillary Refill: Capillary refill takes less than 2 seconds.      Findings: No erythema or rash.   Neurological:      Mental Status: She is alert and oriented to person, place, and time.      Cranial Nerves: No cranial nerve deficit.      Sensory: No sensory deficit.   Psychiatric:         Mood and Affect: Mood normal.      No results for input(s): LABBLOO in the last 48 hours.    CBC:   Recent Labs   Lab 06/11/23  0536 06/12/23  0547   WBC 6.26 6.36   HGB 10.7* 10.5*   HCT 36.0* 34.7*    290     CMP:   Recent Labs   Lab 06/11/23  0536 06/12/23  0547    142   K 4.2 4.0   * 114*   CO2 21* 20*    77   BUN 13 15   CREATININE 1.1 1.2   CALCIUM 9.0 9.3   ANIONGAP 6* 8       Significant Imaging: I have reviewed all pertinent imaging results/findings within the past 24 hours.

## 2023-06-12 NOTE — ASSESSMENT & PLAN NOTE
- S/p Bilateral Nephrostomy tubes; R. Removed after recent complication and remains with L.Nephrostomy  - Continue with Flomax 0.4 mg, daily  - urology following.   -- s/p nephrogram on 6/6/23 which showed continued ureteral obstruction. Exchange of a percutaneous 12 Fr nephrostomy tube without complication on 6/7/2023 by IR  - outpatient urology follow up    6/12- nephrostomy tube is present, urostomy and ileostomy

## 2023-06-12 NOTE — ASSESSMENT & PLAN NOTE
Patient has persistent depression which is severe and is currently uncontrolled. Will Increase anti-depressant medications. We will consult psychiatry at this time. Patient does not display psychosis at this time. Continue to monitor closely and adjust plan of care as needed.  - patient's daughter committed suicide recently   - psychiatry and psychology following, apprec recs  - remeron 30 mg nightly   - coordinating with SW and will consulted psychiatry on 6/11 to assist with discharge planning since patient was declined by [facility]  6/11- need psych f/u

## 2023-06-12 NOTE — DISCHARGE SUMMARY
[facility] Medicine  Discharge Summary      Patient Name: Edita Riley  MRN: [id number]  LUIZ: [id number]  Patient Class: IP- Inpatient  Admission Date: 5/30/2023  Hospital Length of Stay: 7 days  Discharge Date and Time: No discharge date for patient encounter.  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 Patient was discharged last month after presenting with recurrent UTI (E. Faecium VRE). History limited form patient. She had undergone right PCN drain removal and stone extraction with drain replacement. Completed a course of Ertapenem 1 g IV daily for 10 days and Linezolid and was discharged on Bactrim after ID evaluation. Patient denies any fever, chills, nausea, vomiting, hematemesis, cough, chest pain, palpitations, shortness of breath, abdominal pain/distension, changes in bowel or urinary habits, no hematochezia or melena.      Procedure(s) (LRB):  Nephrostogram - antegrade (Left)  LOOPOGRAM (N/A)      Hospital Course:   Per urology, given good renal function, no indication for inpatient placement of right nephrostomy tube for the moderate right hyrdrouteronephrosis. Per ID, switched to ertapenem based on urine culture which grew Enterobacter and will complete 10 day total course. Midline consult placed. Psychiatry following and remeron increased to 30 mg nightly. Psychology following and SW obtaining records from [facility] in [place] for med list.    6/4-  ertapenem, EOC 6/9/23.  VSS.  Midline placed. She is Med Ready for discharge. PEC and CEC in place. CM working with [facility]  [facility]hav [facility].    6/5- Tolerating IV ertapenem well but does note more liquid stool from colostomy. Stool studies pending. She is on Remeron 30 mg nightly and remains PEC'd given her grade disability in managing her own care given her depression and severe bereavement. Given her h/o MDR UTI requiring IV ertapenem at this time, ureteral strictures s/p nephrostomy tube placement and need for repeat nephrostogram (scheduled for 6/6), need to wound care evaluation for colostomy care and refill of outpatient supplies and her grave disability due to MDD and severe bereavement, she requires continued inpatient care to ensure she has a safe discharge to the appropriate psychiatric facility that will have the resources to care for her. She currently lacks the ability to care for herself at home given the complexity of her medical care and her current apathy and grade disability. Arranging for psychiatry facility placement after nephrostogram and stool study results.     6/7- Patient sitting in bed, no acute distress. No acute events overnight. Patient reports left flank pain better controlled. Tolerating IV ertapenem well and will be completed on 6/9/2023. Remains PEC'd for grave disability due to SI 2/2 depression and grief but per psychiatry, suicidal ideations are improving. Left antegrade nephrogram demonstrated continued ureteral obstruction so she underwent exchange of a percutaneous 12 Fr nephrostomy tube today. PT/OT ordered due to functional decline during this hospitalization.     6/8- Nephrostomy tubes pulled per Urology. No back pain. Ostomy working well.  /56. Completes ertapenem tomorrow on 6/9. CM working on dc plan. Will likely need to go home.  is considering LTC- NH in the future.     6/9 - 6/10: Per psychiatry recommendations, primary team rescinded PEC/CEC because pt is no longer in any imminent danger of hurting self or others and not gravely disabled. Pt currently does not meet criteria nor  benefit from from involuntary inpatient psychiatric admission. Psychiatry agrees with voluntary admission to psychiatric facility for continued management of depression. Patient completed a 10 day course of IV antibiotics on 6/9/2023. She has remained stable. Kidney function back to normal. Hemodynamically stable. Mental and functional status at baseline. Pain around left nephrostomy tube controlled after the tube exchange by IR. No complaints at this time. Reports good oral intake and regular output from ileostomy. Since [facility] declined approval for readmission due to high medical needs, primary team discuss discharge planning with psychiatry and social work tomorrow.  6/11- stable, discharge planning with psychiatry and social work. [facility] (Med ready for dc)    6/12- all is stable.  Called Hammad Riley, her . Left voice mail message. Pt tells me he is looking for a new place to live. CM still looking for inpatient or outpt psych  program.   Pt's band is prepared to accept pt. She is in agreement for going home to same place.  He has a new apartment lined up in July in same [facility]. Will offer  psych f/u, but  doesn't think she will agree to someone coming to the house. outpt psych appointment and plan for therapy if she will agree       Goals of Care Treatment Preferences:  Code Status: Full Code      Consults:   Consults (From admission, onward)        Status Ordering Provider     Inpatient consult to Interventional Radiology  Once        Provider:  (Not yet assigned)    Completed KAROLINA CURRY     Inpatient consult to Midline team  Once        Provider:  (Not yet assigned)    Completed BERNARDO DICKINSON     Inpatient consult to Psychology  Once        Provider:  (Not yet assigned)    Completed BERNARDO DICKINSON     Inpatient consult to Psychiatry  Once        Provider:  (Not yet assigned)    Completed BERNARDO DICKINSON     IP consult to case management  Once        Provider:  (Not  yet assigned)    Acknowledged JEFFREY CADET     Inpatient consult to Infectious Diseases  Once        Provider:  (Not yet assigned)    Completed ROCHELLE TRUJILLO          Psychiatric  Major depressive disorder, recurrent episode, moderate  Patient has persistent depression which is severe and is currently uncontrolled. Will Increase anti-depressant medications. We will consult psychiatry at this time. Patient does not display psychosis at this time. Continue to monitor closely and adjust plan of care as needed.  - patient's daughter committed suicide recently   - psychiatry and psychology following, apprec recs  - remeron 30 mg nightly   - coordinating with SW and will consulted psychiatry on 6/11 to assist with discharge planning since patient was declined by [facility]  6/11- need psych f/u      Renal/  * UTI (urinary tract infection)  Bilateral flank pain  - Recurrent Urinary tact infection related to nephrostomy tube and chronic uretral strictures  - Follow up urine cultures and blood cultures  - Urine culture 4/10 growing klebsiella and enterococcus.  - followup urine cultures   - prn percocet and prn IV morphine   - ID consulted. apprec recs  -- urine cx grew Enterobacter   -- changed meropenem to ertapenem to complete 10 day course. Antibiotic course completed on 6/9/2023.    COMPLETED ANTIBIOTICS    Nephrostomy status  - S/p Bilateral Nephrostomy tubes; R. Removed after recent complication and remains with L.Nephrostomy  - Continue with Flomax 0.4 mg, daily  - urology following.   -- s/p nephrogram on 6/6/23 which showed continued ureteral obstruction. Exchange of a percutaneous 12 Fr nephrostomy tube without complication on 6/7/2023 by IR  - outpatient urology follow up    6/12- nephrostomy tube is present, urostomy and ileostomy    ODESSA (acute kidney injury)  ODESSA on CKD  - Scr on admit, 1.7 --> 1.3   - Monitor renal function  - Avoid Nephrotoxic agents  - Monitor urine output   - RESOVLED   - good UO  through nephrostomy tube, and urostomy    ID  Pyelonephritis  resolved      GI  Bilious vomiting with nausea  resolved      Ileostomy in place  - S/p Transverse colon conduit 2020 complicate by bowel perforation requiring hemicolectomy & ileostomy  - increased watery stool from ileostomy on 6/5. Stool studies negative.   - started imodium prn  - wound care evaluated the patient and provided ostomy supplies.  - Follow up outpatient CRS    6/12- needs routine ostomy care    Other  Discharge planning issues  - UTI treatment was completed with IV ertapenem   - C diff negative so isolation discontinued   - per psychiatry, patient no longer an imminent threat to herself or others and is not gravely disabled so PEC was rescinded. Since [facility] declined approval for readmission due to high medical needs, primary team discuss discharge planning with psychiatry and social work tomorrow.  - PT/OT recommend home. No post acute care needs for PT/OT    Physical deconditioning  - patient notes decline in functional status since admission.   - PT/OT recommend home. No post acute care PT/OT needs upon discharge.        Final Active Diagnoses:    Diagnosis Date Noted POA    PRINCIPAL PROBLEM:  UTI (urinary tract infection) [N39.0] 02/16/2023 Yes    ODESSA (acute kidney injury) [N17.9] 03/21/2020 Yes    Ileostomy in place [Z93.2]  Not Applicable     Chronic    Nephrostomy status [Z93.6]  Not Applicable     Chronic    Major depressive disorder, recurrent episode, moderate [F33.1] 06/02/2023 Yes    Bilious vomiting with nausea [R11.14] 06/11/2023 Yes    Discharge planning issues [Z02.9] 04/30/2021 Not Applicable    Physical deconditioning [R53.81] 04/30/2021 Yes    Pyelonephritis [N12] 06/11/2020 Yes      Problems Resolved During this Admission:       Discharged Condition: good    Disposition: Home or Self Care    Follow Up:   Follow-up Information     Primary Doctor No Follow up.                     Patient Instructions:       Ambulatory referral/consult to Outpatient Case Management   Referral Priority: Routine Referral Type: Consultation   Referral Reason: Specialty Services Required   Number of Visits Requested: 1     Ambulatory referral/consult to Psychiatry   Standing Status: Future   Referral Priority: Routine Referral Type: Psychiatric   Referral Reason: Specialty Services Required   Requested Specialty: Psychiatry   Number of Visits Requested: 1     Diet Adult Regular     Notify your health care provider if you experience any of the following:  temperature >100.4     Notify your health care provider if you experience any of the following:  persistent nausea and vomiting or diarrhea     Notify your health care provider if you experience any of the following:  severe uncontrolled pain     Notify your health care provider if you experience any of the following:  redness, tenderness, or signs of infection (pain, swelling, redness, odor or green/yellow discharge around incision site)     Notify your health care provider if you experience any of the following:  difficulty breathing or increased cough     Notify your health care provider if you experience any of the following:  severe persistent headache     Notify your health care provider if you experience any of the following:  worsening rash     Notify your health care provider if you experience any of the following:  persistent dizziness, light-headedness, or visual disturbances     Notify your health care provider if you experience any of the following:  increased confusion or weakness     Activity as tolerated     Ambulatory Request for Ready Responder Services   Standing Status: Future Standing Exp. Date: 06/09/24     Order Specific Question Answer Comments   Program referred to: COVID-19 Vaccine      Ambulatory Request for Ready Responder Services   Standing Status: Future Standing Exp. Date: 06/09/24     Order Specific Question Answer Comments   Program referred to: COVID-19  Vaccine        Significant Diagnostic Studies: Labs:   CMP   Recent Labs   Lab 06/11/23  0536 06/12/23  0547    142   K 4.2 4.0   * 114*   CO2 21* 20*    77   BUN 13 15   CREATININE 1.1 1.2   CALCIUM 9.0 9.3   ANIONGAP 6* 8       Pending Diagnostic Studies:     None         Medications:  Reconciled Home Medications:      Medication List      START taking these medications    mirtazapine 30 MG tablet  Commonly known as: REMERON  Take 1 tablet (30 mg total) by mouth nightly.     oxyCODONE 5 MG immediate release tablet  Commonly known as: ROXICODONE  Take 1 tablet (5 mg total) by mouth every 12 (twelve) hours as needed for Pain.        CONTINUE taking these medications    DETROL LA 2 MG Cp24  Generic drug: tolterodine  Take 2 mg by mouth once daily.     gabapentin 100 MG capsule  Commonly known as: NEURONTIN  Take 2 capsules (200 mg total) by mouth every evening.     tamsulosin 0.4 mg Cap  Commonly known as: FLOMAX  Take 1 capsule (0.4 mg total) by mouth once daily. For spasm associated with nephrostomy tubes        STOP taking these medications    albuterol 90 mcg/actuation inhaler  Commonly known as: VENTOLIN HFA     DULoxetine 60 mg Cdrs     oxybutynin 10 MG 24 hr tablet  Commonly known as: DITROPAN-XL     risperiDONE 0.5 MG Tab  Commonly known as: RISPERDAL     traZODone 100 MG tablet  Commonly known as: DESYREL            Indwelling Lines/Drains at time of discharge:   Lines/Drains/Airways     Drain  Duration                Urostomy 09/11/20 0000 ileal conduit LLQ 1004 days         Ileostomy 09/18/20  days         Nephrostomy 06/07/23 1349 Left 12 Fr. 5 days                Time spent on the discharge of patient:   35 minutes         Mell Adhikari MD  Department of Hospital Medicine  [facility] Surg

## 2023-06-12 NOTE — PT/OT/SLP PROGRESS
Physical Therapy      Patient Name:  Edita Hardin [facility]   MRN:  [id number]      Patient not seen today secondary to pt reports she is waiting for her ostomy to get put back on before she can get out of bed. Pt educated on the effects of prolonged immobility and the importance of performing OOB activity and exercises to promote healing and reduce recovery time. Education completed on bed level activity and exercises with verabal understanding.pt issued and instructed in HEP. Questions/concerns addressed within PTA scope of practice; patient  with no further questions. Time was provided for active listening, discussion of health disposition, and discussion of safe discharge Pt Will follow-up next scheduled treatment per PT POC.TA [phone]

## 2023-06-12 NOTE — PLAN OF CARE
Problem: Violence Risk or Actual  Goal: Anger and Impulse Control  Outcome: Ongoing, Progressing     Problem: Adult Inpatient Plan of Care  Goal: Plan of Care Review  Outcome: Ongoing, Progressing  Goal: Patient-Specific Goal (Individualized)  Outcome: Ongoing, Progressing  Goal: Absence of Hospital-Acquired Illness or Injury  Outcome: Ongoing, Progressing  Goal: Optimal Comfort and Wellbeing  Outcome: Ongoing, Progressing  Goal: Readiness for Transition of Care  Outcome: Ongoing, Progressing     Problem: Fluid and Electrolyte Imbalance (Acute Kidney Injury/Impairment)  Goal: Fluid and Electrolyte Balance  Outcome: Ongoing, Progressing     Problem: Oral Intake Inadequate (Acute Kidney Injury/Impairment)  Goal: Optimal Nutrition Intake  Outcome: Ongoing, Progressing     Problem: Renal Function Impairment (Acute Kidney Injury/Impairment)  Goal: Effective Renal Function  Outcome: Ongoing, Progressing     Problem: Fall Injury Risk  Goal: Absence of Fall and Fall-Related Injury  Outcome: Ongoing, Progressing     Problem: Infection  Goal: Absence of Infection Signs and Symptoms  Outcome: Ongoing, Progressing     Problem: Skin Injury Risk Increased  Goal: Skin Health and Integrity  Outcome: Ongoing, Progressing   Pt is A, A,O x 4 . Stable V/S . No C/O pain during the day. Pt was up in the chair.pt remained calm and cooperative.  No falls or injuries per day. Ileostomy still  leaking and bag changed today, wound care nurse consulted. Urostomy and nephrostomy care done. Pt is ready for discharge to home with family as MD ordered. Waiting for pt's ride.

## 2023-06-12 NOTE — ASSESSMENT & PLAN NOTE
ODESSA on CKD  - Scr on admit, 1.7 --> 1.3   - Monitor renal function  - Avoid Nephrotoxic agents  - Monitor urine output   - RESOVLED   - good UO through nephrostomy tube, and urostomy

## 2023-06-12 NOTE — ASSESSMENT & PLAN NOTE
- S/p Transverse colon conduit 2020 complicate by bowel perforation requiring hemicolectomy & ileostomy  - increased watery stool from ileostomy on 6/5. Stool studies negative.   - started imodium prn  - wound care evaluated the patient and provided ostomy supplies.  - Follow up outpatient CRS    6/12- needs routine ostomy care

## 2023-06-12 NOTE — PLAN OF CARE
Problem: Violence Risk or Actual  Goal: Anger and Impulse Control  Outcome: Ongoing, Progressing     Problem: Adult Inpatient Plan of Care  Goal: Plan of Care Review  Outcome: Ongoing, Progressing  Goal: Patient-Specific Goal (Individualized)  Outcome: Ongoing, Progressing  Goal: Absence of Hospital-Acquired Illness or Injury  Outcome: Ongoing, Progressing  Goal: Optimal Comfort and Wellbeing  Outcome: Ongoing, Progressing     Problem: Fall Injury Risk  Goal: Absence of Fall and Fall-Related Injury  Outcome: Ongoing, Progressing        Patient is AAOX4, vital signs stable. Ileostomy, nephrostomy and urostomy care done. Fall prevention protocol maintained.

## 2023-06-12 NOTE — PT/OT/SLP PROGRESS
Occupational Therapy      Patient Name:  Edita Hardin [facility]   MRN:  [id number]    Patient not seen today secondary to pt to d/c from the hospital today.  Will follow-up as scheduled per OT POC if pt remains in-house.    6/12/2023

## 2023-06-12 NOTE — PLAN OF CARE
[facility]  Discharge Final Note    Primary Care Provider: Primary Doctor No    Expected Discharge Date: 6/12/2023    Final Discharge Note (most recent)       Final Note - 06/12/23 1207          Final Note    Assessment Type Final Discharge Note     Anticipated Discharge Disposition Home or Self Care     Hospital Resources/Appts/Education Provided Community resources provided;Appointments scheduled and added to AVS        Post-Acute Status    Post-Acute Authorization Placement     Post-Acute Placement Status Referrals Sent     Discharge Delays Patient and Family Barriers                     Important Message from Medicare             Contact Info       No, Primary Doctor   Relationship: PCP - General        Next Steps: Follow up

## 2023-06-12 NOTE — PLAN OF CARE
Changed pt's Ileostomy bag about three times today during day shift due to leakage. Pt was given a bed bath today several times due to this leakage. Otherwise no major events.   Problem: Violence Risk or Actual  Goal: Anger and Impulse Control  Outcome: Ongoing, Progressing     Problem: Adult Inpatient Plan of Care  Goal: Plan of Care Review  Outcome: Ongoing, Progressing  Goal: Patient-Specific Goal (Individualized)  Outcome: Ongoing, Progressing  Goal: Absence of Hospital-Acquired Illness or Injury  Outcome: Ongoing, Progressing  Goal: Optimal Comfort and Wellbeing  Outcome: Ongoing, Progressing  Goal: Readiness for Transition of Care  Outcome: Ongoing, Progressing     Problem: Oral Intake Inadequate (Acute Kidney Injury/Impairment)  Goal: Optimal Nutrition Intake  Outcome: Ongoing, Progressing     Problem: Renal Function Impairment (Acute Kidney Injury/Impairment)  Goal: Effective Renal Function  Outcome: Ongoing, Progressing     Problem: Fall Injury Risk  Goal: Absence of Fall and Fall-Related Injury  Outcome: Ongoing, Progressing     Problem: Infection  Goal: Absence of Infection Signs and Symptoms  Outcome: Ongoing, Progressing     Problem: Skin Injury Risk Increased  Goal: Skin Health and Integrity  Outcome: Ongoing, Progressing

## 2023-06-13 ENCOUNTER — PATIENT OUTREACH (OUTPATIENT)
Dept: ADMINISTRATIVE | Facility: CLINIC | Age: 58
End: 2023-06-13
Payer: MEDICAID

## 2023-06-13 ENCOUNTER — PATIENT OUTREACH (OUTPATIENT)
Dept: ADMINISTRATIVE | Facility: OTHER | Age: 58
End: 2023-06-13
Payer: MEDICAID

## 2023-06-13 NOTE — TELEPHONE ENCOUNTER
----- Message from Jayda Casillas sent at 5/6/2020  8:43 AM CDT -----  Contact: self  [phone]  Dr. Mustafa is her doctor and she do not want to see anyone else for hospital  Discharge 4/30 from [facility] / surgery  F/u .   [Weight management counseling provided] : Weight management [Healthy eating counseling provided] : healthy eating [Activity counseling provided] : activity [Needs reinforcement, provided] : Patient needs reinforcement on understanding of disease, goals and obesity follow-up plan; reinforcement was provided [Weight Self Once Weekly] : Weight self once weekly [Low Fat Diet] : Low fat diet [Potential consequences of obesity discussed] : Potential consequences of obesity discussed [Benefits of weight loss discussed] : Benefits of weight loss discussed [Structured Weight Management Program suggested:] : Structured weight management program suggested [Encouraged to maintain food diary] : Encouraged to maintain food diary [Encouraged to increase physical activity] : Encouraged to increase physical activity [Encouraged to use exercise tracking device] : Encouraged to use exercise tracking device [Target Wt Loss Goal ___] : Weight Loss Goals: Target weight loss goal [unfilled] lbs [Weigh Self Weekly] : weigh self weekly [Decrease Portions] : decrease portions [Keep Food Diary] : keep food diary [Good understanding] : Patient has a good understanding of disease, goals and obesity follow-up plan [FreeTextEntry4] : 15

## 2023-06-13 NOTE — PROGRESS NOTES
CHW - Initial Contact    This Community Health Worker completed the Social Determinant of Health questionnaire with patient via telephone today.    Pt identified barriers of most importance are: no needs  Referrals to community agencies completed with patient/caregiver consent outside of [facility] include: N/A  Referrals were put through [facility] - N/A  Support and Services:  takes care of everything  Other information discussed the patient needs / wants help with: We agreed to a follow up call in a month and will close case should there be no needs.     Follow-up Outreach - Due: 7/18/2023

## 2023-06-13 NOTE — PROGRESS NOTES
C3 nurse spoke with Edita Riley for a [facility] post hospital discharge follow up call. The patient has a scheduled [facility] appointment with Krystal Young MD on 06/19/23 @ 1100.

## 2023-06-13 NOTE — PLAN OF CARE
Problem: Violence Risk or Actual  Goal: Anger and Impulse Control  6/12/2023 2131 by Ginny Robles RN  Outcome: Adequate for Care Transition  6/12/2023 1534 by Ginny Robles RN  Outcome: Ongoing, Progressing     Problem: Adult Inpatient Plan of Care  Goal: Plan of Care Review  6/12/2023 2131 by Ginny Robles RN  Outcome: Adequate for Care Transition  6/12/2023 1534 by Ginny Robles RN  Outcome: Ongoing, Progressing  Goal: Patient-Specific Goal (Individualized)  6/12/2023 2131 by Ginny Robles RN  Outcome: Adequate for Care Transition  6/12/2023 1534 by Ginny Robles RN  Outcome: Ongoing, Progressing  Goal: Absence of Hospital-Acquired Illness or Injury  6/12/2023 2131 by Ginny Robles RN  Outcome: Adequate for Care Transition  6/12/2023 1534 by Ginny Robles RN  Outcome: Ongoing, Progressing  Goal: Optimal Comfort and Wellbeing  6/12/2023 2131 by Ginny Robles RN  Outcome: Adequate for Care Transition  6/12/2023 1534 by Ginny Robles RN  Outcome: Ongoing, Progressing  Goal: Readiness for Transition of Care  6/12/2023 2131 by Ginny Robles RN  Outcome: Adequate for Care Transition  6/12/2023 1534 by Ginny Robles RN  Outcome: Ongoing, Progressing     Problem: Fluid and Electrolyte Imbalance (Acute Kidney Injury/Impairment)  Goal: Fluid and Electrolyte Balance  6/12/2023 2131 by Ginny Robles RN  Outcome: Adequate for Care Transition  6/12/2023 1534 by Ginny Robles RN  Outcome: Ongoing, Progressing     Problem: Oral Intake Inadequate (Acute Kidney Injury/Impairment)  Goal: Optimal Nutrition Intake  6/12/2023 2131 by Ginny Robles RN  Outcome: Adequate for Care Transition  6/12/2023 1534 by Ginny Robles RN  Outcome: Ongoing, Progressing     Problem: Renal Function Impairment (Acute Kidney Injury/Impairment)  Goal: Effective Renal Function  6/12/2023 2131 by Ginny Robles RN  Outcome: Adequate for Care Transition  6/12/2023 1534 by Ginny Robles RN  Outcome: Ongoing, Progressing      Problem: Fall Injury Risk  Goal: Absence of Fall and Fall-Related Injury  6/12/2023 2131 by Ginny Robles RN  Outcome: Adequate for Care Transition  6/12/2023 1534 by Ginny Robles RN  Outcome: Ongoing, Progressing     Problem: Infection  Goal: Absence of Infection Signs and Symptoms  6/12/2023 2131 by Ginny Robles RN  Outcome: Adequate for Care Transition  6/12/2023 1534 by Ginny Robles RN  Outcome: Ongoing, Progressing     Problem: Skin Injury Risk Increased  Goal: Skin Health and Integrity  6/12/2023 2131 by Ginny Robles RN  Outcome: Adequate for Care Transition  6/12/2023 1534 by Ginny Robles RN  Outcome: Ongoing, Progressing   Pt is a,A,O x 4 . Stable V/S . No C/O pain during the day. Pt was up in the chair with minimal assist. No falls or injuries per day. Drains care done and out put charted in Epic. Ileostomy bag changed . Pt is ready for discharge to  home with her family as MD ordered. BROOK called pt's  to come and  the pt to go home but he didn't answer, Brook left a message.

## 2023-06-13 NOTE — NURSING
"Placed call to spouse, Hammad, at  about pt's DC. Hammad claimed that he never received a call, or a VM, and will come and get the pt tonight, but is "Pissed off about how the hospital handled this". Hammad is upset that we can't arrange transport to be downstairs at a set time so he doesn't have to come inside.   "

## 2023-06-23 NOTE — PROGRESS NOTES
[facility]  Progress Note    Patient Name: Edita Riley  MRN: [id number]  Date of Evaluation- 04/10/2023  PCP- Primary Doctor No    Future cases for Edita Riley [[id number]]       Case ID Status Date Time Yeison Procedure Provider Location    [id number] [facility] 4/21/2023  7:00  NEPHROLITHOTOMY, PERCUTANEOUS Chirag Russ MD [[id number]] [facility] OR 2ND FLR            HPI:  History of present illness- I had the pleasure of meeting this pleasant 58 y.o. lady in the pre op clinic prior to her elective Urological surgery. The patient is new to me .   Offered to have her  on the phone during the consultation     I have obtained the history by speaking to the patient and by reviewing the electronic health records.    Events leading up to surgery / History of presenting illness -    Right ureteral stone   Right renal stone       She has been troubled with severe  Rt loin pain pain for 6 months  .   Pain increases with activity and decreases with resting.    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    She has urine through the nephrostomy tubes as well as the colon conduit     Relevant health conditions of significance for the perioperative period/ History of presenting illness -    Health conditions of significance for the perioperative period      -CKD3  -History of DVT  -History of cervical cancer 2015, 2021  -Ileostomy, Urostomy  - HTN  - Depression   - Emphysema    She lives with her  and her  daughter in a single-level house  She has help available after surgery- and the daughter    Not known to have heart disease , Diabetes Mellitus,              Subjective/ Objective:     Chief complaint-Preoperative evaluation, Perioperative Medical management, complication reduction plan     Active cardiac conditions- none    Revised cardiac risk index predictors- none  As per her she had a stroke at age 7  "Years - Unknown cause - was under a lot of stress at that time . I am unsure if she had stroke    Functional capacity -Examples of physical activity , tries to exercise, walks, around the house ,  can take a flight of stairs holding on to the railing----- She can undertake all the above activities without  chest pain,chest tightness, Shortness of breath ,dizziness,lightheadedness making her exercise tolerance more,   than 4 Mets.       Review of Systems   Constitutional:         No unusual weight changes  Eating good- 3 meals a day   HENT:          AUDREY score  1/ 8      Age over 50        Eyes:         No new visual changes   Respiratory:            Dry cough   No Hemoptysis   Cardiovascular:         As noted   Gastrointestinal:              Endocrine:        Prednisone use > 20 mg daily for 3 weeks- None   Genitourinary:         No urinary hesitancy    Musculoskeletal:         As above      Skin:  Negative for rash.   Neurological:  Negative for syncope.   Hematological:         Current use of Anticoagulants  None   Psychiatric/Behavioral:            No SI/HI   Had not seizure in a long time - - Sounds like Medication related   No vascular stenting             No anesthesia, bleeding, cardiac problems , PONV with previous surgeries/procedures.  Medications and Allergies reviewed in epic.     FH- No anesthesia,bleeding / venous thrombosis ,  heart disease in family      Physical Exam  Blood pressure 108/71, pulse 78, temperature 97.8 °F (36.6 °C), temperature source Oral, resp. rate 16, height 5' 9" (1.753 m), weight 85.3 kg (188 lb 1.6 oz), , SpO2 98 %.      Investigations  Lab and Imaging have been reviewed in [facility].    Review of Medicine tests    EKG- I had independently reviewed the EKG from--4/4/2023  It was reported to be showing     Normal sinus rhythm   Normal ECG   When compared with ECG of 04-JAN-2023 18:25,   Questionable change in The axis     Oct 2022    . Scintigraphically negative for ischemia or " infarct.  2. The global left ventricular systolic function is normal with an LV ejection fraction of greater than 75 % and no evidence of LV dilatation. Wall motion is normal.    2022  The left ventricle is normal in size with mild concentric hypertrophy and normal systolic function.  The estimated ejection fraction is 70%.  Normal left ventricular diastolic function.  Normal right ventricular size with normal right ventricular systolic function.  Mild mitral regurgitation.  Normal central venous pressure (3 mmHg).  The estimated PA systolic pressure is 16 mmHg.       I offered a gown and the presence of a chaperone during physical examination   She was comfortable to proceed with the exam without the the presence of a chaperone        Physical Exam  Constitutional- Vitals - Body mass index is 27.78 kg/m².,   Vitals:    04/10/23 1153   BP: 108/71   Pulse: 78   Resp: 16   Temp: 97.8 °F (36.6 °C)     General appearance-Conscious,Coherent  Eyes- No conjunctival icterus,pupils  round  and reactive to light   ENT-Oral cavity- moist    , Hearing grossly normal   Neck- No thyromegaly ,Trachea -central, No jugular venous distension,   No Carotid Bruit   Cardiovascular -Heart Sounds- Normal  and  no murmur   , No gallop rhythm   Respiratory - Normal Respiratory Effort, Normal breath sounds,  no wheeze , and  no forced expiratory wheeze    Peripheral pitting pedal edema-- none , no calf pain   Gastrointestinal -Soft abdomen, No palpable masses, Non Tender,Liver,Spleen not palpable. No-- free fluid and shifting dullness  Musculoskeletal- No finger Clubbing. Strength grossly normal   Lymphatic-No Palpable cervical, axillary,Inguinal lymphadenopathy   Psychiatric - normal effect,Orientation  Rt Dorsalis pedis pulses-palpable    Lt Dorsalis pedis pulses- palpable   Rt Posterior tibial pulses -palpable   Left posterior tibial pulses -palpable   Miscellaneous -  Surgical scarmid abdomen   and  no renal bruit   Review of clinical  lab tests:  Lab Results   Component Value Date    CREATININE 1.1 03/22/2023    HGB 10.4 (L) 03/22/2023     03/22/2023         Review of old records- Was done and information gathered regards to events leading to surgery and health conditions of significance in the perioperative period       2018     No evidence of diffusion restriction suggest acute infarction.     No acute intracranial process on this limited examination.     Unremarkable MRA of the intracranial circulation      Review of old records- Was done and information gathered regards to events leading to surgery and health conditions of significance in the perioperative period.        Preoperative cardiac risk assessment-  The patient does not have any active cardiac conditions . Revised cardiac risk index predictors- 0---.Functional capacity is more than 4 Mets. She will be undergoing a Urological procedure that carries a Moderate Risk risk     Risk of a major Cardiac event ( Defined as death, myocardial infarction, or cardiac arrest at 30 days after noncardiac surgery), based on RCRI score     -3.9%         No further cardiac work up is indicated prior to proceeding with the surgery     Orders Placed This Encounter    Basic Metabolic Panel    albuterol (VENTOLIN HFA) 90 mcg/actuation inhaler       American Society of Anesthesiologists Physical status classification ( ASA ) class: 3     Postoperative pulmonary complication risk assessment:         Vishnu Respiratory failure index- percentage risk of respiratory failure: 0.5 %    Assessment/Plan:     Nephrostomy status  History of distal ureteral strcitures due to XRT for cervical cancer.    She underwent a transverse colon conduit on 9/11/2020.   .       Carpal tunnel syndrome on right  Not trouble much with it     Migraine without aura and without status migrainosus, not intractable  She is taking Tylenol as needed    Neuropathy due to chemotherapeutic drug  She has occasional tingling numbness  of the feet and hands   She is taking gabapentin    Depression  She is taking Remeron  She is doing good with the depression    Hard to intubate   I suggest that the perioperative team be aware of this so that appropriatecare can be taken     History of essential hypertension  She is currently not needing blood pressure medication  Home BP- 108/71    Recent BP readings in the record-  Vitals - 1 value per visit 3/28/2023 3/28/2023 3/28/2023 3/28/2023   SYSTOLIC 107 111 103 100   DIASTOLIC 53 56 57 55     Vitals - 1 value per visit 3/29/2023 3/29/2023 4/4/2023 4/4/2023 4/10/2023   SYSTOLIC  114  98 108   DIASTOLIC  66  54 71     Hypertension-  Blood pressure is acceptable . I suggest  blood pressure  monitoring .I suggest addressing pain control as uncontrolled pain can increased blood pressure     CKD (chronic kidney disease), stage III  Creatinine is stable at about 1    Stages of CKD discussed  Deleterious effects NSAID's , Beneficial effects of Hydration discussed   Tylenol as needed for pain     I  suggest monitoring renal function, in put and out put status socorro-operatively. I  suggest avoiding nephrotoxic medication including NSAIDs, COX2 inhibitors, intravenous contrast agent,avoiding hypotension to prevent further renal impairment.     I suggested care with using ketorolac given that it is an anti-inflammatory medication  She does not seem to be taking that very frequently  Suggested 1 week preop hold       History of DVT (deep vein thrombosis)  History of DVT- in both lower extremities     US - 9/3/2021- No evidence of deep venous thrombosis in either lower extremity.  I was unable to pull the records from [facility] system that had thrombosis    History of DVT  No PE  Episode- 1  In the setting of her rehab stay , in the setting of post op   Associated with reduced mobility,no long journeys,no cancer, prior surgery, Hospital stay,no HRT  Anticoagulated for a few months  IVC filter - None    Her history  of thrombosis seems to be provoked in the setting of postoperative time from reduced mobility    She had no recurrence of blood clot and has not had any blood clots since 2021    Increased risk of thrombosis in the socorro operative period , compression stocking use discussed    Anemia due to chronic kidney disease  Her hemoglobin is stable about 10  No visible blood losses    History of cervical cancer  -History of cervical cancer 2015   Had recurrence in 2021  Had radiation, chemo in 2021  Currently doing good     Internal hemorrhoids  Not troubled     Arthritis  Both knees  Long standing   Takes Tramadol , 1-2 a day   Chronic continuous opioid use- In view of the opioid use, the patient may have opioid tolerance . I suggest considering the possibility of opioid tolerance  in planning post operative pain control     Discussed possibly reducing the opioid use in preparation for surgery , so that it reduces the opioid tolerance which helps post op pain control         Emphysema of lung  CT Dec 2020 showed  Mild diffuse emphysematous changes   Never smoked tobacco     She is not trouble with breathing but occasionally gets short of breath  She has Group A COPD  She  does not seem to be troubled with shortness of breath    He has dry cough  No wheezing  No inhaler or oxygen use    Pulmonary optimization measures    I suggest the following to help with the pulmonary status in the perioperative period     Preoperative period     Ordered albuterol inhaler for p.r.n. use  - Inhaler technique reinforcement    - Increased Physical activity   - Mucolytic / Expectorant treatment  - Mucinex  - Weight  loss that helps the pulmonary status-       Intra / post operative period     - Minimize use of sedating Medication- Opioid/ Benzodiazepine   - Consider use of regional block , to reduce the need for opioid treatment  - Consider opioid sparing anesthesia/Analgesia   - Consult Respiratory therapy , Physical therapy   - Cough, Deep  breathing exercises   - Early ambulation   - Out of bed to chair   - Incentive spirometer use   -  adequate pain control ,so as to avoid diaphragmatic splinting  )   -  Oral care , head end of bed elevation, Nutrition   - Oxygen saturation, End tidal CO2 monitoring    - Having patient in a monitored care area so that hemodynamics , respiratory status can be monitored         Edema  Salted food related       Edema- I suggested avoidance of added salt,avoidance of NSAID's, unless advised or ordered  and suggested Limb elevation and tamiko hose use    Refusal of blood transfusions as patient is Advent  I suggested talking to the perioperative care team about this although she may not require transfusion      Preventive perioperative care    Thromboembolic prophylaxis:  Her risk factors for thrombosis include surgical procedure, previous history of thrombosis, and age.I suggest  thromboembolic prophylaxis ( mechanical/pharmacological, weighing the risk benefits of pharmacological agent use considering socorro procedural bleeding )  during the perioperative period.I suggested being active in the post operative period.      Postoperative pulmonary complication prophylaxis-Risk factors for post operative pulmonary complications include age over 65 years, ASA class >2, COPD, and proximity of the surgical site to the lungs- I suggest incentive spirometry use, early ambulation, end tidal carbon dioxide monitoring, and pain control so as to avoid diaphragmatic splinting  , oral care , head end of bed elevation      Renal complication prophylaxis-Risk factors for renal complications include pre-existing renal disease . I suggest keeping her well hydrated and avoidance/ minimizing the use of  NSAID's,JACOBSON 2 Inhibitors ,IV contrast if possible in the perioperative period.     Surgical site Infection Prophylaxis-I  suggest appropriate antibiotic for Prophylaxis against Surgical site infections  No reported Leonard  infection  Skin antibacterial discussed       Delirium prophylaxis-Risk factors - opioid use - I suggest avoidance / minimizing the use of  Benzodiazepines ( unless the patient has been taking it on a regular basis ),Anticholinergic medication,Antihistamines ( like  Benadryl).I suggest minimizing the use of opioid medication and use of IV tylenol,if it is appropriate. I suggest using the lowest possible dose of opioids for the shortest duration possible in the perioperative period. I suggest to Keep shades/blinds open during the day, lights off and shades closed at night to encourage normal sleep/wake cycle.I encourage the presence of the family member with the patient at all times, if at all possible as mental status changes can be picked up early by the family members and they help with reorientation. I encouraged the presence of family to help with orientation in the perioperative period. Benadryl avoidance suggested      In view of urological procedure the patient  is at risk of postoperative urinary retention.  I suggest avoidance / minimizing the of  Benzodiazepines,Anticholinergic medication,antihistamines ( Benadryl) , if possible in the perioperative period. I suggest using the minimum possible use of opioids for the minimum period of time in the perioperative period. Benadryl avoidance suggested      This visit was focused on Preoperative evaluation, Perioperative Medical management, complication reduction plans. I suggest that the patient follows up with primary care or relevant sub specialists for ongoing health care.    I appreciate the opportunity to be involved in this patients care. Please feel free to contact me if there were any questions about this consultation.    Patient is optimized    Patient/ care giver/ Family member was instructed to call and update me about any changes to health,  medication, office visits ,testing out side of the socorro operative care center , hospitalizations between now  and surgery      Sofie Driver MD  Internal Medicine  [facility]   Cell Phone- [phone]    History of COVID - no   COVID vaccination status -None    COVID screening     No fever   No sore throat   No loss of taste or smell   No muscle aches   No nausea, vomiting , diarrhea     Checked for over-the-counter medication   She has a remote history of stroke about 7 years of age.  I am unsure about the stroke history   She has had several surgeries with no problems  I am checking a basic metabolic panel because recent contrast exposure with a background of chronic kidney disease    I have spent -105----- minutes of time which includes, time spent to prepare to see the patient , obtaining history ,performing examination, counseling/Educating the patient , Documenting clinical information in the record      I suggested enrolling and primary  -  4/10/2023- 1722    Clarification-I suggested enrolling in primary care  Basic metabolic panel showed stable creatinine over time  She is on sodium bicarbonate  --  4/20/2023- 17 08    Currently hospitalized   Called to speak to her   Spoke to her  Mr Cueva  Defer further care to In patient  team   15

## 2023-07-26 ENCOUNTER — OFFICE VISIT (OUTPATIENT)
Dept: UROLOGY | Facility: CLINIC | Age: 58
End: 2023-07-26
Payer: MEDICAID

## 2023-07-26 VITALS
DIASTOLIC BLOOD PRESSURE: 68 MMHG | WEIGHT: 178 LBS | SYSTOLIC BLOOD PRESSURE: 101 MMHG | HEART RATE: 80 BPM | BODY MASS INDEX: 26.29 KG/M2

## 2023-07-26 DIAGNOSIS — N39.0 URINARY TRACT INFECTION WITHOUT HEMATURIA, SITE UNSPECIFIED: ICD-10-CM

## 2023-07-26 DIAGNOSIS — N13.30 HYDRONEPHROSIS OF LEFT KIDNEY: Primary | ICD-10-CM

## 2023-07-26 PROCEDURE — 87086 URINE CULTURE/COLONY COUNT: CPT | Performed by: UROLOGY

## 2023-07-26 PROCEDURE — 99215 PR OFFICE/OUTPT VISIT, EST, LEVL V, 40-54 MIN: ICD-10-PCS | Mod: S$PBB,,, | Performed by: UROLOGY

## 2023-07-26 PROCEDURE — 99213 OFFICE O/P EST LOW 20 MIN: CPT | Mod: PBBFAC | Performed by: UROLOGY

## 2023-07-26 PROCEDURE — 99215 OFFICE O/P EST HI 40 MIN: CPT | Mod: S$PBB,,, | Performed by: UROLOGY

## 2023-07-26 PROCEDURE — 99999 PR PBB SHADOW E&M-EST. PATIENT-LVL III: ICD-10-PCS | Mod: PBBFAC,,, | Performed by: UROLOGY

## 2023-07-26 PROCEDURE — 99999 PR PBB SHADOW E&M-EST. PATIENT-LVL III: CPT | Mod: PBBFAC,,, | Performed by: UROLOGY

## 2023-07-26 RX ORDER — CEFDINIR 300 MG/1
300 CAPSULE ORAL 2 TIMES DAILY
Qty: 14 CAPSULE | Refills: 0 | Status: SHIPPED | OUTPATIENT
Start: 2023-07-26 | End: 2023-08-02

## 2023-07-26 NOTE — PROGRESS NOTES
CHIEF COMPLAINT:    Mrs. Riley is a 58 y.o. female presenting for a follow up on left ureteral stricture  PRESENTING ILLNESS:    Edita Rilye is a 58 y.o. female who is presents with a history of bilateral ureteral stricture secondary to XRT for cervical cancer.  She was able to have the right nephrostomy tube removed.  The left is still in place, she had an antegrade nephrostogram and a loopogram 6/6/2023.  The nephrostomy tube was changed on 6/7/2023.  She has an issue with the ileostomy leaking.      REVIEW OF SYSTEMS:    Review of Systems   Constitutional: Negative.    HENT: Negative.     Eyes: Negative.    Respiratory: Negative.     Cardiovascular: Negative.    Gastrointestinal: Negative.    Genitourinary:         Left nephrostomy tube. Transverse colon conduit   Musculoskeletal: Negative.    Skin: Negative.    Neurological: Negative.    Endo/Heme/Allergies: Negative.    Psychiatric/Behavioral: Negative.       PATIENT HISTORY:    Past Medical History:   Diagnosis Date    Abnormal mammogram 08/25/2020    Acute blood loss anemia     Acute deep vein thrombosis (DVT) of lower extremity 12/09/2020    Advance care planning 04/30/2021    Anemia due to chronic blood loss     Anemia due to chronic kidney disease     Anxiety     Bilateral ureteral obstruction 9/11/2020    Cardiovascular event risk -- low 09/14/2015    ASCVD 10-year risk 1.9% (with optimal risk factors 1.3%) as of 9/14/2015     Cervical cancer 2014    Chronic back pain     Colostomy care     Deep vein thrombosis     Depression     Diarrhea due to malabsorption 11/14/2018    Difficult intubation     Disorder of kidney and ureter     DVT of lower extremity, bilateral 11/04/2020    Emphysema of lung 4/10/2023    Fibromyalgia     Fungemia 09/27/2020    Generalized abdominal pain 08/25/2020    GERD (gastroesophageal reflux disease)     Hemifacial spasm 09/16/2015    Hiatal hernia 2014    History of cervical cancer 10/11/2018    Hx of  psychiatric care     Cymbalta, trazodone    Hypertension     Hypomagnesemia 11/21/2018    Lactose intolerance     Metastatic squamous cell carcinoma to lymph node 10/11/2018    Neuropathy due to chemotherapeutic drug 11/14/2018    Osteoarthritis of back     Peritonitis 09/22/2020    Pseudomonas urinary tract infection 04/21/2021    Psychiatric problem     Refusal of blood transfusions as patient is [employer]     Schatzki's ring 09/14/2015    Seen on outside EGD 05/2014, underwent esophageal dilatation. Bx were negative.     Seizures     Sleep stage dysfunction     Noted on PSG 06/2017; negative for obstructive sleep apnea     Stroke     Urinary tract infection associated with nephrostomy catheter 06/11/2020    Wound infection after surgery 09/24/2020       Past Surgical History:   Procedure Laterality Date    ANTEGRADE NEPHROSTOGRAPHY Bilateral 9/28/2020    Procedure: Nephrostogram - antegrade;  Surgeon: Leslie Balbuena MD;  Location: [facility] OR [address] [place] [address];  Service: Urology;  Laterality: Bilateral;    ANTEGRADE NEPHROSTOGRAPHY Left 4/20/2021    Procedure: NEPHROSTOGRAM, ANTEGRADE;  Surgeon: Leslie Balbuena MD;  Location: [facility] OR 1ST FLR;  Service: Urology;  Laterality: Left;    ANTEGRADE NEPHROSTOGRAPHY Right 10/27/2022    Procedure: NEPHROSTOGRAM, ANTEGRADE;  Surgeon: Chirag Russ MD;  Location: [facility] OR 1ST FLR;  Service: Urology;  Laterality: Right;    ANTEGRADE NEPHROSTOGRAPHY Right 4/21/2023    Procedure: NEPHROSTOGRAM, ANTEGRADE;  Surgeon: Chirag Russ MD;  Location: [facility] OR 2ND FLR;  Service: Urology;  Laterality: Right;    ANTEGRADE NEPHROSTOGRAPHY Left 6/6/2023    Procedure: Nephrostogram - antegrade;  Surgeon: Leslie Balbuena MD;  Location: [facility] OR 1ST FLR;  Service: Urology;  Laterality: Left;    BILATERAL OOPHORECTOMY  2015    CHOLECYSTECTOMY  11/09/2016    Done at [facility], path showed chronic cholecystitis and gallstones    cold knife conization  2014    COLECTOMY, RIGHT  9/17/2020     Procedure: COLECTOMY, RIGHT Extended;  Surgeon: Hammad Reynolds MD;  Location: [facility] OR 2ND FLR;  Service: Colon and Rectal;;    COLONOSCOPY  2014    [facility]    COLONOSCOPY N/A 5/18/2018    Procedure: COLONOSCOPY;  Surgeon: Arden Gutiérrez MD;  Location: [facility] (4TH FLR);  Service: Endoscopy;  Laterality: N/A;    COLONOSCOPY N/A 7/28/2020    Procedure: COLONOSCOPY;  Surgeon: Hammad Reynolds MD;  Location: [facility] (4TH FLR);  Service: Colon and Rectal;  Laterality: N/A;  covid test [place] 7/25    CYSTOSCOPY WITH URETEROSCOPY, RETROGRADE PYELOGRAPHY, AND INSERTION OF STENT Bilateral 3/21/2020    Procedure: CYSTOSCOPY, WITH RETROGRADE PYELOGRAM,;  Surgeon: Leslie Balbuena MD;  Location: [facility] OR 1ST FLR;  Service: Urology;  Laterality: Bilateral;    DILATION OF NEPHROSTOMY TRACT Right 10/27/2022    Procedure: DILATION, NEPHROSTOMY TRACT;  Surgeon: Chirag Russ MD;  Location: [facility] OR 1ST FLR;  Service: Urology;  Laterality: Right;    ESOPHAGOGASTRODUODENOSCOPY  2014    ESOPHAGOGASTRODUODENOSCOPY  11/18/2020    ESOPHAGOGASTRODUODENOSCOPY N/A 11/18/2020    Procedure: ESOPHAGOGASTRODUODENOSCOPY (EGD);  Surgeon: Zenon Spencer MD;  Location: [facility];  Service: Endoscopy;  Laterality: N/A;    ESOPHAGOGASTRODUODENOSCOPY N/A 12/11/2020    Procedure: EGD (ESOPHAGOGASTRODUODENOSCOPY);  Surgeon: Juancho Muse MD;  Location: [facility] (2ND FLR);  Service: Endoscopy;  Laterality: N/A;    HYSTERECTOMY  2014    [facility] for cervical cancer    ILEOSTOMY  9/17/2020    Procedure: CREATION, ILEOSTOMY;  Surgeon: Hammad Reynolds MD;  Location: [facility] OR 2ND FLR;  Service: Colon and Rectal;;    LOOPOGRAM N/A 4/20/2021    Procedure: LOOPOGRAM;  Surgeon: Leslie Balbuena MD;  Location: [facility] OR 1ST FLR;  Service: Urology;  Laterality: N/A;    LOOPOGRAM N/A 6/6/2023    Procedure: LOOPOGRAM;  Surgeon: Leslie Balbuena MD;  Location: [facility] OR [address] [place] [address];  Service: Urology;  Laterality:  N/A;    MOBILIZATION OF SPLENIC FLEXURE N/A 9/11/2020    Procedure: MOBILIZATION, COLONIC;  Surgeon: Hammad Reynolds MD;  Location: [facility] OR 2ND FLR;  Service: Colon and Rectal;  Laterality: N/A;    NEPHROSTOGRAPHY Bilateral 4/17/2021    Procedure: Nephrostogram;  Surgeon: Celeste Surgeon;  Location: [facility];  Service: Anesthesiology;  Laterality: Bilateral;    OOPHORECTOMY      PERCUTANEOUS NEPHROLITHOTOMY Right 4/21/2023    Procedure: NEPHROLITHOTOMY, PERCUTANEOUS;  Surgeon: Chirag Russ MD;  Location: [facility] OR 2ND FLR;  Service: Urology;  Laterality: Right;  2.5 hrs    PERCUTANEOUS NEPHROSTOMY  4/21/2023    Procedure: CREATION, NEPHROSTOMY, PERCUTANEOUS with removal of existing nephrostomy tube;  Surgeon: Chirag Russ MD;  Location: [facility] OR 2ND FLR;  Service: Urology;;    PYELOSCOPY Right 10/27/2022    Procedure: PYELOSCOPY;  Surgeon: Chirag Russ MD;  Location: [facility] OR 1ST FLR;  Service: Urology;  Laterality: Right;    REPLACEMENT OF NEPHROSTOMY TUBE Right 10/27/2022    Procedure: REPLACEMENT, NEPHROSTOMY TUBE;  Surgeon: Chirag Russ MD;  [facility];  Service: Urology;  Laterality: Right;    RETROPERITONEAL LYMPHADENECTOMY Right 9/17/2018    Procedure: LYMPHADENECTOMY, RETROPERITONEUM;  Surgeon: Sebas Reed MD;  [facility];  Service: General;  Laterality: Right;  ILIAC    URETEROSCOPIC REMOVAL OF URETERIC CALCULUS Right 10/27/2022    Procedure: REMOVAL, CALCULUS, URETER, URETEROSCOPIC;  Surgeon: Chirag Russ MD;  Location: [facility] OR 1ST FLR;  Service: Urology;  Laterality: Right;    URETEROSCOPY Right 10/27/2022    Procedure: URETEROSCOPY-ANTEGRADE;  Surgeon: Chirag Russ MD;  Location: [facility] OR 1ST FLR;  Service: Urology;  Laterality: Right;       Family History   Problem Relation Age of Onset    Heart disease Sister     Heart disease Maternal Grandmother     Colon cancer Father     Esophageal cancer Father     Cancer Father         Lung-smoker     Cancer Mother         Cervical    Cervical cancer Mother     Breast cancer Maternal Aunt     Suicide Daughter         jumped from parking structure    Drug abuse Daughter     Drug abuse Daughter      Social History     Socioeconomic History    Marital status:      Spouse name: Hammad    Number of children: 2   Tobacco Use    Smoking status: Never    Smokeless tobacco: Never   Substance and Sexual Activity    Alcohol use: No     Alcohol/week: 0.0 standard drinks    Drug use: No    Sexual activity: Yes     Partners: Male     Birth control/protection: None     Comment:  19 years since    Social History Narrative    , twin daughters (1  2018), disabled due to childhood stroke, Taoist sophia     Social Determinants of Health     Financial Resource Strain: Low Risk     Difficulty of Paying Living Expenses: Not hard at all   Food Insecurity: No Food Insecurity    Worried About Running Out of Food in the Last Year: Never true    Ran Out of Food in the Last Year: Never true   Transportation Needs: No Transportation Needs    Lack of Transportation (Medical): No    Lack of Transportation (Non-Medical): No   Physical Activity: Sufficiently Active    Days of Exercise per Week: 3 days    Minutes of Exercise per Session: 60 min   Stress: No Stress Concern Present    Feeling of Stress : Not at all   Social Connections: Moderately Isolated    Frequency of Communication with Friends and Family: Never    Frequency of Social Gatherings with Friends and Family: Twice a week    Attends Taoist Services: More than 4 times per year    Active Member of Clubs or Organizations: No    Attends Club or Organization Meetings: Never    Marital Status:    Housing Stability: Low Risk     Unable to Pay for Housing in the Last Year: No    Number of Places Lived in the Last Year: 1    Unstable Housing in the Last Year: No       Allergies:  Bee sting [allergen ext-venom-honey bee] and Grass  pollen-bermuda, standard    Medications:  Outpatient Encounter Medications as of 7/26/2023   Medication Sig Dispense Refill    gabapentin (NEURONTIN) 100 MG capsule Take 2 capsules (200 mg total) by mouth every evening. 90 capsule 3    mirtazapine (REMERON) 30 MG tablet Take 1 tablet (30 mg total) by mouth nightly. 30 tablet 11    oxyCODONE (ROXICODONE) 5 MG immediate release tablet Take 1 tablet (5 mg total) by mouth every 12 (twelve) hours as needed for Pain. 20 tablet 0    tamsulosin (FLOMAX) 0.4 mg Cap Take 1 capsule (0.4 mg total) by mouth once daily. For spasm associated with nephrostomy tubes 30 capsule 0    tolterodine (DETROL LA) 2 MG Cp24 Take 2 mg by mouth once daily.       No facility-administered encounter medications on file as of 7/26/2023.         PHYSICAL EXAMINATION:    The patient generally appears in good health, is appropriately interactive, and is in no apparent distress.    Skin: No lesions.    Mental: Cooperative with normal affect.    Neuro: Grossly intact.    HEENT: Normal. No evidence of lymphadenopathy.    Chest:  normal inspiratory effort.    Abdomen: Soft, non-tender. No masses or organomegaly. Bladder is not palpable. No evidence of flank discomfort. No evidence of inguinal hernia.  She has a towel to get the leakage from the ileostomy    Extremities: No clubbing, cyanosis, or edema    A culture was sent from the left nephrostomy tube  LABS:    Lab Results   Component Value Date    BUN 15 06/12/2023    CREATININE 1.2 06/12/2023       IMPRESSION:    Urinary tract infection  Left distal ureteral stricture in a colon conduit    PLAN:    1. Will plan to do the revision in late September  2.  Message sent to the ET nurses for an appointment for the leaking   3.  Follow up in mid September   4.  Order sent to schedule nephrostomy tube change around Sept 7th.   5.  Urine from left nephrostomy tube sent.  Cefdinir sent empirically    I spent 40 minutes with the patient of which more than half  was spent in direct consultation with the patient in regards to our treatment and plan.

## 2023-07-27 ENCOUNTER — TELEPHONE (OUTPATIENT)
Dept: WOUND CARE | Facility: CLINIC | Age: 58
End: 2023-07-27
Payer: COMMERCIAL

## 2023-07-27 LAB
BACTERIA UR CULT: NORMAL
BACTERIA UR CULT: NORMAL

## 2023-07-27 NOTE — TELEPHONE ENCOUNTER
I have tried to call pt and make an appt but only get her VM   Left message and gave her my direct number to call to discuss appt with me,

## 2023-07-27 NOTE — TELEPHONE ENCOUNTER
----- Message from Leslie Balbuena MD sent at 7/26/2023  5:39 PM CDT -----  Please reach out to the patient for an appointment with either ET nurse.  She is having issues with her ileostomy leaking.

## 2023-07-28 ENCOUNTER — TELEPHONE (OUTPATIENT)
Dept: UROLOGY | Facility: CLINIC | Age: 58
End: 2023-07-28
Payer: MEDICAID

## 2023-08-03 ENCOUNTER — PATIENT OUTREACH (OUTPATIENT)
Dept: ADMINISTRATIVE | Facility: OTHER | Age: 58
End: 2023-08-03
Payer: MEDICAID

## 2023-08-03 NOTE — PROGRESS NOTES
CHW - Follow Up    This Community Health Worker completed a follow up visit with patient and caregiver via telephone today.  Pt/Caregiver reported: needed address changed in chart, need adult diapers and for ileostomy supplies to be sent to new address.   Community Health Worker provided: assurance that address is changed and that I submitted resources to patient for adult diapers and that I will contact wound care to get supplies sent to appropriate address. We agreed to follow up in a week.     Follow-up Outreach - Due: 8/10/2023

## 2023-08-18 ENCOUNTER — HOSPITAL ENCOUNTER (OUTPATIENT)
Facility: HOSPITAL | Age: 58
Discharge: HOME | End: 2023-08-20
Attending: EMERGENCY MEDICINE | Admitting: STUDENT IN AN ORGANIZED HEALTH CARE EDUCATION/TRAINING PROGRAM
Payer: MEDICAID

## 2023-08-18 DIAGNOSIS — R10.9 ABDOMINAL PAIN: ICD-10-CM

## 2023-08-18 DIAGNOSIS — K56.609 SBO (SMALL BOWEL OBSTRUCTION): ICD-10-CM

## 2023-08-18 DIAGNOSIS — K56.609 SMALL BOWEL OBSTRUCTION: Primary | ICD-10-CM

## 2023-08-18 DIAGNOSIS — R11.14 BILIOUS VOMITING WITH NAUSEA: ICD-10-CM

## 2023-08-18 LAB
ALBUMIN SERPL BCP-MCNC: 3.5 G/DL (ref 3.5–5.2)
ALLENS TEST: NORMAL
ALP SERPL-CCNC: 133 U/L (ref 55–135)
ALT SERPL W/O P-5'-P-CCNC: 16 U/L (ref 10–44)
ANION GAP SERPL CALC-SCNC: 12 MMOL/L (ref 8–16)
AST SERPL-CCNC: 19 U/L (ref 10–40)
BASOPHILS # BLD AUTO: 0.03 K/UL (ref 0–0.2)
BASOPHILS NFR BLD: 0.3 % (ref 0–1.9)
BILIRUB SERPL-MCNC: 0.3 MG/DL (ref 0.1–1)
BUN SERPL-MCNC: 13 MG/DL (ref 6–20)
CALCIUM SERPL-MCNC: 9.1 MG/DL (ref 8.7–10.5)
CHLORIDE SERPL-SCNC: 111 MMOL/L (ref 95–110)
CO2 SERPL-SCNC: 19 MMOL/L (ref 23–29)
CREAT SERPL-MCNC: 1.4 MG/DL (ref 0.5–1.4)
DIFFERENTIAL METHOD: ABNORMAL
EOSINOPHIL # BLD AUTO: 0.1 K/UL (ref 0–0.5)
EOSINOPHIL NFR BLD: 1.3 % (ref 0–8)
ERYTHROCYTE [DISTWIDTH] IN BLOOD BY AUTOMATED COUNT: 15.6 % (ref 11.5–14.5)
EST. GFR  (NO RACE VARIABLE): 43.1 ML/MIN/1.73 M^2
GLUCOSE SERPL-MCNC: 106 MG/DL (ref 70–110)
HCT VFR BLD AUTO: 42.8 % (ref 37–48.5)
HGB BLD-MCNC: 12.8 G/DL (ref 12–16)
IMM GRANULOCYTES # BLD AUTO: 0.03 K/UL (ref 0–0.04)
IMM GRANULOCYTES NFR BLD AUTO: 0.3 % (ref 0–0.5)
LDH SERPL L TO P-CCNC: 2.11 MMOL/L (ref 0.5–2.2)
LYMPHOCYTES # BLD AUTO: 1.1 K/UL (ref 1–4.8)
LYMPHOCYTES NFR BLD: 9.8 % (ref 18–48)
MAGNESIUM SERPL-MCNC: 1.9 MG/DL (ref 1.6–2.6)
MCH RBC QN AUTO: 27.3 PG (ref 27–31)
MCHC RBC AUTO-ENTMCNC: 29.9 G/DL (ref 32–36)
MCV RBC AUTO: 91 FL (ref 82–98)
MONOCYTES # BLD AUTO: 0.7 K/UL (ref 0.3–1)
MONOCYTES NFR BLD: 6.6 % (ref 4–15)
NEUTROPHILS # BLD AUTO: 9.1 K/UL (ref 1.8–7.7)
NEUTROPHILS NFR BLD: 81.7 % (ref 38–73)
NRBC BLD-RTO: 0 /100 WBC
PLATELET # BLD AUTO: 249 K/UL (ref 150–450)
PMV BLD AUTO: 11 FL (ref 9.2–12.9)
POTASSIUM SERPL-SCNC: 4 MMOL/L (ref 3.5–5.1)
PROCALCITONIN SERPL IA-MCNC: 0.03 NG/ML
PROT SERPL-MCNC: 7.9 G/DL (ref 6–8.4)
RBC # BLD AUTO: 4.69 M/UL (ref 4–5.4)
SAMPLE: NORMAL
SITE: NORMAL
SODIUM SERPL-SCNC: 142 MMOL/L (ref 136–145)
WBC # BLD AUTO: 11.15 K/UL (ref 3.9–12.7)

## 2023-08-18 PROCEDURE — 81001 URINALYSIS AUTO W/SCOPE: CPT | Performed by: EMERGENCY MEDICINE

## 2023-08-18 PROCEDURE — 87040 BLOOD CULTURE FOR BACTERIA: CPT | Performed by: EMERGENCY MEDICINE

## 2023-08-18 PROCEDURE — 87086 URINE CULTURE/COLONY COUNT: CPT | Performed by: EMERGENCY MEDICINE

## 2023-08-18 PROCEDURE — 25500020 PHARM REV CODE 255: Performed by: EMERGENCY MEDICINE

## 2023-08-18 PROCEDURE — 85025 COMPLETE CBC W/AUTO DIFF WBC: CPT | Performed by: EMERGENCY MEDICINE

## 2023-08-18 PROCEDURE — 99900035 HC TECH TIME PER 15 MIN (STAT)

## 2023-08-18 PROCEDURE — 83605 ASSAY OF LACTIC ACID: CPT

## 2023-08-18 PROCEDURE — 93010 ELECTROCARDIOGRAM REPORT: CPT | Mod: ,,, | Performed by: INTERNAL MEDICINE

## 2023-08-18 PROCEDURE — 84145 PROCALCITONIN (PCT): CPT | Performed by: EMERGENCY MEDICINE

## 2023-08-18 PROCEDURE — 83735 ASSAY OF MAGNESIUM: CPT | Performed by: EMERGENCY MEDICINE

## 2023-08-18 PROCEDURE — 96365 THER/PROPH/DIAG IV INF INIT: CPT

## 2023-08-18 PROCEDURE — 96361 HYDRATE IV INFUSION ADD-ON: CPT

## 2023-08-18 PROCEDURE — 25000003 PHARM REV CODE 250: Performed by: EMERGENCY MEDICINE

## 2023-08-18 PROCEDURE — 93005 ELECTROCARDIOGRAM TRACING: CPT

## 2023-08-18 PROCEDURE — 96375 TX/PRO/DX INJ NEW DRUG ADDON: CPT

## 2023-08-18 PROCEDURE — 63600175 PHARM REV CODE 636 W HCPCS: Performed by: EMERGENCY MEDICINE

## 2023-08-18 PROCEDURE — 99285 EMERGENCY DEPT VISIT HI MDM: CPT | Mod: 25

## 2023-08-18 PROCEDURE — 80053 COMPREHEN METABOLIC PANEL: CPT | Performed by: EMERGENCY MEDICINE

## 2023-08-18 PROCEDURE — 93010 EKG 12-LEAD: ICD-10-PCS | Mod: ,,, | Performed by: INTERNAL MEDICINE

## 2023-08-18 RX ORDER — MORPHINE SULFATE 4 MG/ML
4 INJECTION, SOLUTION INTRAMUSCULAR; INTRAVENOUS
Status: COMPLETED | OUTPATIENT
Start: 2023-08-18 | End: 2023-08-18

## 2023-08-18 RX ORDER — DROPERIDOL 2.5 MG/ML
1.25 INJECTION, SOLUTION INTRAMUSCULAR; INTRAVENOUS
Status: COMPLETED | OUTPATIENT
Start: 2023-08-18 | End: 2023-08-18

## 2023-08-18 RX ADMIN — MORPHINE SULFATE 4 MG: 4 INJECTION INTRAVENOUS at 08:08

## 2023-08-18 RX ADMIN — SODIUM CHLORIDE, POTASSIUM CHLORIDE, SODIUM LACTATE AND CALCIUM CHLORIDE 1986 ML: 600; 310; 30; 20 INJECTION, SOLUTION INTRAVENOUS at 08:08

## 2023-08-18 RX ADMIN — ERTAPENEM 1 G: 1 INJECTION INTRAMUSCULAR; INTRAVENOUS at 08:08

## 2023-08-18 RX ADMIN — IOHEXOL 100 ML: 350 INJECTION, SOLUTION INTRAVENOUS at 10:08

## 2023-08-18 RX ADMIN — DROPERIDOL 1.25 MG: 2.5 INJECTION, SOLUTION INTRAMUSCULAR; INTRAVENOUS at 08:08

## 2023-08-19 PROBLEM — K56.609 SMALL BOWEL OBSTRUCTION: Status: ACTIVE | Noted: 2023-08-19

## 2023-08-19 LAB
ALBUMIN SERPL BCP-MCNC: 2.8 G/DL (ref 3.5–5.2)
ALLENS TEST: NORMAL
ALP SERPL-CCNC: 117 U/L (ref 55–135)
ALT SERPL W/O P-5'-P-CCNC: 15 U/L (ref 10–44)
ANION GAP SERPL CALC-SCNC: 12 MMOL/L (ref 8–16)
AST SERPL-CCNC: 19 U/L (ref 10–40)
BACTERIA #/AREA URNS AUTO: ABNORMAL /HPF
BASOPHILS # BLD AUTO: 0.04 K/UL (ref 0–0.2)
BASOPHILS NFR BLD: 0.4 % (ref 0–1.9)
BILIRUB SERPL-MCNC: 0.2 MG/DL (ref 0.1–1)
BILIRUB UR QL STRIP: NEGATIVE
BUN SERPL-MCNC: 9 MG/DL (ref 6–20)
CALCIUM SERPL-MCNC: 8.8 MG/DL (ref 8.7–10.5)
CHLORIDE SERPL-SCNC: 112 MMOL/L (ref 95–110)
CLARITY UR REFRACT.AUTO: ABNORMAL
CO2 SERPL-SCNC: 17 MMOL/L (ref 23–29)
COLOR UR AUTO: YELLOW
CREAT SERPL-MCNC: 1.1 MG/DL (ref 0.5–1.4)
DIFFERENTIAL METHOD: ABNORMAL
EOSINOPHIL # BLD AUTO: 0.1 K/UL (ref 0–0.5)
EOSINOPHIL NFR BLD: 0.5 % (ref 0–8)
ERYTHROCYTE [DISTWIDTH] IN BLOOD BY AUTOMATED COUNT: 15.6 % (ref 11.5–14.5)
EST. GFR  (NO RACE VARIABLE): 57.5 ML/MIN/1.73 M^2
GLUCOSE SERPL-MCNC: 81 MG/DL (ref 70–110)
GLUCOSE UR QL STRIP: NEGATIVE
HCT VFR BLD AUTO: 39.2 % (ref 37–48.5)
HGB BLD-MCNC: 11.8 G/DL (ref 12–16)
HGB UR QL STRIP: ABNORMAL
HYALINE CASTS UR QL AUTO: 0 /LPF
IMM GRANULOCYTES # BLD AUTO: 0.02 K/UL (ref 0–0.04)
IMM GRANULOCYTES NFR BLD AUTO: 0.2 % (ref 0–0.5)
KETONES UR QL STRIP: NEGATIVE
LDH SERPL L TO P-CCNC: 1.33 MMOL/L (ref 0.5–2.2)
LEUKOCYTE ESTERASE UR QL STRIP: ABNORMAL
LYMPHOCYTES # BLD AUTO: 1.4 K/UL (ref 1–4.8)
LYMPHOCYTES NFR BLD: 13.6 % (ref 18–48)
MAGNESIUM SERPL-MCNC: 1.6 MG/DL (ref 1.6–2.6)
MCH RBC QN AUTO: 27.1 PG (ref 27–31)
MCHC RBC AUTO-ENTMCNC: 30.1 G/DL (ref 32–36)
MCV RBC AUTO: 90 FL (ref 82–98)
MICROSCOPIC COMMENT: ABNORMAL
MONOCYTES # BLD AUTO: 1 K/UL (ref 0.3–1)
MONOCYTES NFR BLD: 9.6 % (ref 4–15)
NEUTROPHILS # BLD AUTO: 7.5 K/UL (ref 1.8–7.7)
NEUTROPHILS NFR BLD: 75.7 % (ref 38–73)
NITRITE UR QL STRIP: NEGATIVE
NRBC BLD-RTO: 0 /100 WBC
PH UR STRIP: 6 [PH] (ref 5–8)
PHOSPHATE SERPL-MCNC: 2.7 MG/DL (ref 2.7–4.5)
PLATELET # BLD AUTO: 178 K/UL (ref 150–450)
PMV BLD AUTO: 11.3 FL (ref 9.2–12.9)
POTASSIUM SERPL-SCNC: 4 MMOL/L (ref 3.5–5.1)
PROT SERPL-MCNC: 6.6 G/DL (ref 6–8.4)
PROT UR QL STRIP: ABNORMAL
RBC # BLD AUTO: 4.35 M/UL (ref 4–5.4)
RBC #/AREA URNS AUTO: 59 /HPF (ref 0–4)
SAMPLE: NORMAL
SITE: NORMAL
SODIUM SERPL-SCNC: 141 MMOL/L (ref 136–145)
SP GR UR STRIP: 1.02 (ref 1–1.03)
URN SPEC COLLECT METH UR: ABNORMAL
WBC # BLD AUTO: 9.93 K/UL (ref 3.9–12.7)
WBC #/AREA URNS AUTO: >100 /HPF (ref 0–5)
WBC CLUMPS UR QL AUTO: ABNORMAL

## 2023-08-19 PROCEDURE — 83605 ASSAY OF LACTIC ACID: CPT

## 2023-08-19 PROCEDURE — G0378 HOSPITAL OBSERVATION PER HR: HCPCS

## 2023-08-19 PROCEDURE — 80053 COMPREHEN METABOLIC PANEL: CPT | Performed by: STUDENT IN AN ORGANIZED HEALTH CARE EDUCATION/TRAINING PROGRAM

## 2023-08-19 PROCEDURE — 63600175 PHARM REV CODE 636 W HCPCS: Performed by: STUDENT IN AN ORGANIZED HEALTH CARE EDUCATION/TRAINING PROGRAM

## 2023-08-19 PROCEDURE — 84100 ASSAY OF PHOSPHORUS: CPT | Performed by: STUDENT IN AN ORGANIZED HEALTH CARE EDUCATION/TRAINING PROGRAM

## 2023-08-19 PROCEDURE — 96361 HYDRATE IV INFUSION ADD-ON: CPT

## 2023-08-19 PROCEDURE — 85025 COMPLETE CBC W/AUTO DIFF WBC: CPT | Performed by: STUDENT IN AN ORGANIZED HEALTH CARE EDUCATION/TRAINING PROGRAM

## 2023-08-19 PROCEDURE — 83735 ASSAY OF MAGNESIUM: CPT | Performed by: STUDENT IN AN ORGANIZED HEALTH CARE EDUCATION/TRAINING PROGRAM

## 2023-08-19 PROCEDURE — 99900035 HC TECH TIME PER 15 MIN (STAT)

## 2023-08-19 PROCEDURE — 36415 COLL VENOUS BLD VENIPUNCTURE: CPT | Performed by: STUDENT IN AN ORGANIZED HEALTH CARE EDUCATION/TRAINING PROGRAM

## 2023-08-19 PROCEDURE — 96372 THER/PROPH/DIAG INJ SC/IM: CPT | Mod: 59 | Performed by: STUDENT IN AN ORGANIZED HEALTH CARE EDUCATION/TRAINING PROGRAM

## 2023-08-19 RX ORDER — SODIUM CHLORIDE 0.9 % (FLUSH) 0.9 %
10 SYRINGE (ML) INJECTION
Status: DISCONTINUED | OUTPATIENT
Start: 2023-08-19 | End: 2023-08-20 | Stop reason: HOSPADM

## 2023-08-19 RX ORDER — ACETAMINOPHEN 325 MG/1
650 TABLET ORAL EVERY 6 HOURS PRN
Status: DISCONTINUED | OUTPATIENT
Start: 2023-08-19 | End: 2023-08-20 | Stop reason: HOSPADM

## 2023-08-19 RX ORDER — MORPHINE SULFATE 2 MG/ML
2 INJECTION, SOLUTION INTRAMUSCULAR; INTRAVENOUS EVERY 4 HOURS PRN
Status: DISCONTINUED | OUTPATIENT
Start: 2023-08-19 | End: 2023-08-19

## 2023-08-19 RX ORDER — SODIUM CHLORIDE, SODIUM LACTATE, POTASSIUM CHLORIDE, CALCIUM CHLORIDE 600; 310; 30; 20 MG/100ML; MG/100ML; MG/100ML; MG/100ML
INJECTION, SOLUTION INTRAVENOUS CONTINUOUS
Status: DISCONTINUED | OUTPATIENT
Start: 2023-08-19 | End: 2023-08-19

## 2023-08-19 RX ORDER — LIDOCAINE HYDROCHLORIDE 10 MG/ML
1 INJECTION, SOLUTION EPIDURAL; INFILTRATION; INTRACAUDAL; PERINEURAL ONCE AS NEEDED
Status: DISCONTINUED | OUTPATIENT
Start: 2023-08-19 | End: 2023-08-20 | Stop reason: HOSPADM

## 2023-08-19 RX ORDER — ENOXAPARIN SODIUM 100 MG/ML
40 INJECTION SUBCUTANEOUS EVERY 24 HOURS
Status: DISCONTINUED | OUTPATIENT
Start: 2023-08-19 | End: 2023-08-20 | Stop reason: HOSPADM

## 2023-08-19 RX ORDER — MORPHINE SULFATE 4 MG/ML
4 INJECTION, SOLUTION INTRAMUSCULAR; INTRAVENOUS EVERY 4 HOURS PRN
Status: DISCONTINUED | OUTPATIENT
Start: 2023-08-19 | End: 2023-08-19

## 2023-08-19 RX ORDER — ONDANSETRON 8 MG/1
8 TABLET, ORALLY DISINTEGRATING ORAL EVERY 8 HOURS PRN
Status: DISCONTINUED | OUTPATIENT
Start: 2023-08-19 | End: 2023-08-20 | Stop reason: HOSPADM

## 2023-08-19 RX ADMIN — ENOXAPARIN SODIUM 40 MG: 40 INJECTION SUBCUTANEOUS at 04:08

## 2023-08-19 RX ADMIN — SODIUM CHLORIDE, POTASSIUM CHLORIDE, SODIUM LACTATE AND CALCIUM CHLORIDE: 600; 310; 30; 20 INJECTION, SOLUTION INTRAVENOUS at 01:08

## 2023-08-19 NOTE — H&P
GENERAL SURGERY  HISTORY AND PHYSICAL        See consult note from 8/19 for full history and physical.         -- Bria Smith M.D  General Surgery PGY5  Pager: [phone]

## 2023-08-19 NOTE — SUBJECTIVE & OBJECTIVE
Interval History: NAEO. Some output from ostomy. No complaints this am.     Medications:  Continuous Infusions:   lactated ringers 125 mL/hr at 08/19/23 0141     Scheduled Meds:   enoxparin  40 mg Subcutaneous Daily     PRN Meds:LIDOcaine (PF) 10 mg/ml (1%), morphine, morphine, ondansetron, sodium chloride 0.9%     Review of patient's allergies indicates:   Allergen Reactions    Bee sting [allergen ext-venom-honey bee]      Rash      Grass pollen-bermuda, standard      rash     Objective:     Vital Signs (Most Recent):  Temp: 97.1 °F (36.2 °C) (08/19/23 0310)  Pulse: 71 (08/19/23 0310)  Resp: 14 (08/19/23 0310)  BP: (!) 136/97 (08/19/23 0310)  SpO2: 100 % (08/19/23 0310) Vital Signs (24h Range):  Temp:  [97.1 °F (36.2 °C)-99.8 °F (37.7 °C)] 97.1 °F (36.2 °C)  Pulse:  [71-90] 71  Resp:  [14-20] 14  SpO2:  [98 %-100 %] 100 %  BP: (114-137)/(63-97) 136/97     Weight: 88 kg (194 lb 0.1 oz)  Body mass index is 28.64 kg/m².    Intake/Output - Last 3 Shifts         08/17 0700  08/18 0659 08/18 0700  08/19 0659 08/19 0700  08/20 0659    IV Piggyback  2086     Total Intake(mL/kg)  2086 (23.7)     Urine (mL/kg/hr)  600     Stool  100     Total Output  700     Net  +1386                     Physical Exam  Vitals reviewed.   Constitutional:       General: She is not in acute distress.  HENT:      Head: Normocephalic and atraumatic.      Nose: Nose normal.   Eyes:      General:         Right eye: No discharge.         Left eye: No discharge.   Cardiovascular:      Rate and Rhythm: Normal rate and regular rhythm.   Pulmonary:      Effort: Pulmonary effort is normal. No respiratory distress.      Breath sounds: No stridor.   Abdominal:      Comments: Soft, ND  Ileal conduit  Ileostomy   Some drain from umbilicus    Musculoskeletal:         General: Normal range of motion.      Cervical back: Normal range of motion.   Skin:     General: Skin is warm and dry.      Capillary Refill: Capillary refill takes 2 to 3 seconds.    Neurological:      General: No focal deficit present.      Mental Status: She is alert and oriented to person, place, and time.   Psychiatric:         Mood and Affect: Mood normal.         Behavior: Behavior normal.          Significant Labs:  I have reviewed all pertinent lab results within the past 24 hours.    Significant Diagnostics:  I have reviewed all pertinent imaging results/findings within the past 24 hours.

## 2023-08-19 NOTE — ED TRIAGE NOTES
Edita Riley, a 58 y.o. female presents to the ED w/ complaint of generalized abdominal pain with nausea and vomiting that started on today. Pt has colostomy and left side nephrostomy tube.    Triage note:  Chief Complaint   Patient presents with    Multiple complaints     Nephrostomy tube L kidney, vomiting bile in triage, abd pain, finished antibiotics last week for uti     Review of patient's allergies indicates:   Allergen Reactions    Bee sting [allergen ext-venom-honey bee]      Rash      Grass pollen-bermuda, standard      rash     Past Medical History:   Diagnosis Date    Abnormal mammogram 08/25/2020    Acute blood loss anemia     Acute deep vein thrombosis (DVT) of lower extremity 12/09/2020    Advance care planning 04/30/2021    Anemia due to chronic blood loss     Anemia due to chronic kidney disease     Anxiety     Bilateral ureteral obstruction 9/11/2020    Cardiovascular event risk -- low 09/14/2015    ASCVD 10-year risk 1.9% (with optimal risk factors 1.3%) as of 9/14/2015     Cervical cancer 2014    Chronic back pain     Colostomy care     Deep vein thrombosis     Depression     Diarrhea due to malabsorption 11/14/2018    Difficult intubation     Disorder of kidney and ureter     DVT of lower extremity, bilateral 11/04/2020    Emphysema of lung 4/10/2023    Fibromyalgia     Fungemia 09/27/2020    Generalized abdominal pain 08/25/2020    GERD (gastroesophageal reflux disease)     Hemifacial spasm 09/16/2015    Hiatal hernia 2014    History of cervical cancer 10/11/2018    Hx of psychiatric care     Cymbalta, trazodone    Hypertension     Hypomagnesemia 11/21/2018    Lactose intolerance     Metastatic squamous cell carcinoma to lymph node 10/11/2018    Neuropathy due to chemotherapeutic drug 11/14/2018    Osteoarthritis of back     Peritonitis 09/22/2020    Pseudomonas urinary tract infection 04/21/2021    Psychiatric problem     Refusal of blood transfusions as patient is [employer]'s  Witness     Ranjit's ring 09/14/2015    Seen on outside EGD 05/2014, underwent esophageal dilatation. Bx were negative.     Seizures     Sleep stage dysfunction     Noted on PSG 06/2017; negative for obstructive sleep apnea     Stroke     Urinary tract infection associated with nephrostomy catheter 06/11/2020    Wound infection after surgery 09/24/2020    Patient identifiers for Edita Riley checked and correct.    LOC: The patient is awake, alert and aware of environment with an appropriate affect, the patient is oriented x 4 and speaking appropriately.    APPEARANCE: Patient resting comfortably and in no acute distress, patient is clean and well groomed, patient's clothing is properly fastened.    SKIN: The skin is warm and dry, color consistent with ethnicity, patient has normal skin turgor and moist mucus membranes, skin intact, no breakdown or bruising noted.    MUSCULOSKELETAL: Patient moving all extremities well, no obvious swelling or deformities noted.    RESPIRATORY: Airway is open and patent, respirations are spontaneous and even, patient has a normal effort and rate.    CARDIAC: Patient has a normal rate and rhythm, no periphreal edema noted, capillary refill < 3 seconds.    ABDOMEN: Soft and non tender to palpation, no distention noted. Patient has had nausea, vomiting, but no diarrhea, or constipation.     NEUROLOGIC: Eyes open spontaneously, PERRL, behavior appropriate to situation, follows commands, facial expression symmetrical, bilateral hand grasp equal and even, purposeful motor response noted, normal sensation in all extremities.     HEENT: No abnormalities noted. White sclera and pupils equal round and reactive to light. Denies headache, dizziness.     : Pt voids independently, denies dysuria, hematuria, frequency.

## 2023-08-19 NOTE — PROVIDER PROGRESS NOTES - EMERGENCY DEPT.
Patient was signed out to me by Dr. Kumar pending CT imaging. Briefly, this is a 57yo F with complicated surgical history with abdominal pain, N/V.    PE:   Const: Awake and alert, NAD  Resp: Even and unlabored  Abd: Soft, ND, NTTP  Neuro: Moves all extremities equally  Psych: Normal mood and affect    Data:  Results for orders placed or performed during the hospital encounter of 08/18/23   CBC auto differential   Result Value Ref Range    WBC 11.15 3.90 - 12.70 K/uL    RBC 4.69 4.00 - 5.40 M/uL    Hemoglobin 12.8 12.0 - 16.0 g/dL    Hematocrit 42.8 37.0 - 48.5 %    MCV 91 82 - 98 fL    MCH 27.3 27.0 - 31.0 pg    MCHC 29.9 (L) 32.0 - 36.0 g/dL    RDW 15.6 (H) 11.5 - 14.5 %    Platelets 249 150 - 450 K/uL    MPV 11.0 9.2 - 12.9 fL    Immature Granulocytes 0.3 0.0 - 0.5 %    Gran # (ANC) 9.1 (H) 1.8 - 7.7 K/uL    Immature Grans (Abs) 0.03 0.00 - 0.04 K/uL    Lymph # 1.1 1.0 - 4.8 K/uL    Mono # 0.7 0.3 - 1.0 K/uL    Eos # 0.1 0.0 - 0.5 K/uL    Baso # 0.03 0.00 - 0.20 K/uL    nRBC 0 0 /100 WBC    Gran % 81.7 (H) 38.0 - 73.0 %    Lymph % 9.8 (L) 18.0 - 48.0 %    Mono % 6.6 4.0 - 15.0 %    Eosinophil % 1.3 0.0 - 8.0 %    Basophil % 0.3 0.0 - 1.9 %    Differential Method Automated    Comprehensive metabolic panel   Result Value Ref Range    Sodium 142 136 - 145 mmol/L    Potassium 4.0 3.5 - 5.1 mmol/L    Chloride 111 (H) 95 - 110 mmol/L    CO2 19 (L) 23 - 29 mmol/L    Glucose 106 70 - 110 mg/dL    BUN 13 6 - 20 mg/dL    Creatinine 1.4 0.5 - 1.4 mg/dL    Calcium 9.1 8.7 - 10.5 mg/dL    Total Protein 7.9 6.0 - 8.4 g/dL    Albumin 3.5 3.5 - 5.2 g/dL    Total Bilirubin 0.3 0.1 - 1.0 mg/dL    Alkaline Phosphatase 133 55 - 135 U/L    AST 19 10 - 40 U/L    ALT 16 10 - 44 U/L    eGFR 43.1 (A) >60 mL/min/1.73 m^2    Anion Gap 12 8 - 16 mmol/L   Magnesium   Result Value Ref Range    Magnesium 1.9 1.6 - 2.6 mg/dL   Procalcitonin   Result Value Ref Range    Procalcitonin 0.03 <0.25 ng/mL   ISTAT Lactate   Result Value Ref Range     "POC Lactate 2.11 0.5 - 2.2 mmol/L    Sample VENOUS     Site Other     Allens Test N/A      *Note: Due to a large number of results and/or encounters for the requested time period, some results have not been displayed. A complete set of results can be found in Results Review.      Imaging Results              X-Ray Chest AP Portable (Final result)  Result time 08/18/23 23:34:59      Final result by Sebas Bermeo MD (08/18/23 23:34:59)                   Impression:      No detrimental change when compared with 06/07/2023.      Electronically signed by: Sebas Bermeo MD  Date:    08/18/2023  Time:    23:34               Narrative:    EXAMINATION:  XR CHEST AP PORTABLE    CLINICAL HISTORY:  Provided history is "Sepsis;  ".    TECHNIQUE:  One view of the chest.    COMPARISON:  06/07/2023.    FINDINGS:  Cardiac wires overlie the chest.  Cardiac silhouette is not enlarged.  No focal consolidation.  No sizable pleural effusion.  No pneumothorax.                                        CT Abdomen Pelvis With Contrast (Final result)  Result time 08/18/23 23:31:42      Final result by Sebas Bermeo MD (08/18/23 23:31:42)                   Impression:      Findings suggestive of partial small-bowel obstruction with transition point in the mid abdomen just beneath the anterior abdominal wall.    Postoperative changes of colon conduit with left lower quadrant colostomy and right lower quadrant ileostomy.  Persistent mild right-sided hydronephrosis.  Similar appearance of left nephrostomy tube without left-sided hydronephrosis.    Mild hepatomegaly with evidence suggestive of steatosis.    Other stable findings above.    This report was flagged in Epic as abnormal.    Electronically signed by resident: Tony Andujar  Date:    08/18/2023  Time:    22:28    Electronically signed by: Sebas Bermeo MD  Date:    08/18/2023  Time:    23:31               Narrative:    EXAMINATION:  CT ABDOMEN PELVIS WITH CONTRAST    CLINICAL " HISTORY:  Abdominal abscess/infection suspected;    TECHNIQUE:  Axial images of the abdomen and pelvis were acquired  after the use of 100 cc [id number] IV contrast. No oral contrast was administered.  Coronal and sagittal reconstructions were also obtained.    COMPARISON:  CT abdomen pelvis 05/20/2023.    FINDINGS:  LUNG BASES: Heart and pericardium are within normal limits.  Lung bases are stable including a stable small nodule versus scarring in the right middle lobe.  Right basilar subsegmental atelectasis.    HEPATOBILIARY: Liver is mildly enlarged measuring 18.2 cm craniocaudad.  Diffuse hepatic parenchymal hypoattenuation suggestive of steatosis.  No focal hepatic lesions. No biliary ductal dilatation. Normal gallbladder.    SPLEEN: No splenomegaly.    PANCREAS: No focal masses or ductal dilatation.    ADRENALS: No adrenal nodules.    KIDNEYS/URETERS: Kidneys are normal in size and attenuation.  Persistent mild right hydronephrosis, unchanged.  Postoperative changes of colon conduit with left lower quadrant colostomy.  Left nephrostomy tube appears in stable position.  No left hydroureteronephrosis.  Left kidney simple cyst.    BLADDER/PELVIC ORGANS: Bladder is poorly distended.  Uterus surgically absent.    PERITONEUM / RETROPERITONEUM: Trace abdominopelvic free fluid.  No pneumoperitoneum.  No portal venous gas.  No intra-abdominal fluid collections.    LYMPH NODES: No lymphadenopathy.    VESSELS: Unremarkable.    GI TRACT: Stomach appears normal.  Postoperative changes of right hemicolectomy and right lower quadrant ileostomy.  There are few fluid-filled loops of mildly distended small bowel in the mid abdomen.  Small bowel measures up to 3.5 cm in maximum diameter.  Small bowel feces sign with relative transition point in the mid anterior abdomen beneath the abdominal wall below the umbilicus (axial image 109 and coronal image 129).  Mild adjacent mesenteric edema and trace free fluid.  No wall thickening or  other inflammatory changes.  Postoperative changes of colon conduit with left lower quadrant colostomy/urostomy.    SOFT TISSUES: Postoperative changes in the abdominal wall.    BONES: No fractures or focal osseous lesions.                                       MDM:   58-year-old female with abdominal pain, nausea, vomiting.  CT imaging shows SBO.  Patient admitted to general surgery.

## 2023-08-19 NOTE — ED NOTES
Attempted multiple times to convince pt to let me insert NG tube, but she keeps refusing stating that she had one before and it was very uncomfortable. I explained to pt the cons of not getting NG tube placement, pt still continued to refuse placement.  was messaged and nurse on floor notified during report of issue.

## 2023-08-19 NOTE — PROGRESS NOTES
[facility]  General Surgery  Progress Note    Subjective:     History of Present Illness:  No notes on file    Post-Op Info:  * No surgery found *         Interval History: NAEO. Some output from ostomy. No complaints this am.     Medications:  Continuous Infusions:   lactated ringers 125 mL/hr at 08/19/23 0141     Scheduled Meds:   enoxparin  40 mg Subcutaneous Daily     PRN Meds:LIDOcaine (PF) 10 mg/ml (1%), morphine, morphine, ondansetron, sodium chloride 0.9%     Review of patient's allergies indicates:   Allergen Reactions    Bee sting [allergen ext-venom-honey bee]      Rash      Grass pollen-bermuda, standard      rash     Objective:     Vital Signs (Most Recent):  Temp: 97.1 °F (36.2 °C) (08/19/23 0310)  Pulse: 71 (08/19/23 0310)  Resp: 14 (08/19/23 0310)  BP: (!) 136/97 (08/19/23 0310)  SpO2: 100 % (08/19/23 0310) Vital Signs (24h Range):  Temp:  [97.1 °F (36.2 °C)-99.8 °F (37.7 °C)] 97.1 °F (36.2 °C)  Pulse:  [71-90] 71  Resp:  [14-20] 14  SpO2:  [98 %-100 %] 100 %  BP: (114-137)/(63-97) 136/97     Weight: 88 kg (194 lb 0.1 oz)  Body mass index is 28.64 kg/m².    Intake/Output - Last 3 Shifts         08/17 0700 08/18 0659 08/18 0700 08/19 0659 08/19 0700 08/20 0659    IV Piggyback  2086     Total Intake(mL/kg)  2086 (23.7)     Urine (mL/kg/hr)  600     Stool  100     Total Output  700     Net  +1386                     Physical Exam  Vitals reviewed.   Constitutional:       General: She is not in acute distress.  HENT:      Head: Normocephalic and atraumatic.      Nose: Nose normal.   Eyes:      General:         Right eye: No discharge.         Left eye: No discharge.   Cardiovascular:      Rate and Rhythm: Normal rate and regular rhythm.   Pulmonary:      Effort: Pulmonary effort is normal. No respiratory distress.      Breath sounds: No stridor.   Abdominal:      Comments: Soft, ND  Ileal conduit  Ileostomy   Some drain from umbilicus    Musculoskeletal:         General: Normal range of  motion.      Cervical back: Normal range of motion.   Skin:     General: Skin is warm and dry.      Capillary Refill: Capillary refill takes 2 to 3 seconds.   Neurological:      General: No focal deficit present.      Mental Status: She is alert and oriented to person, place, and time.   Psychiatric:         Mood and Affect: Mood normal.         Behavior: Behavior normal.          Significant Labs:  I have reviewed all pertinent lab results within the past 24 hours.    Significant Diagnostics:  I have reviewed all pertinent imaging results/findings within the past 24 hours.    Assessment/Plan:     * Small bowel obstruction  Edita Riley is a 58 y.o. female with PMH cervical cancer with a complicated surgical history from a transverse colon urinary diversion that was complicated by colon perforation with ultimate extended right hemicolectomy and end ileostomy creation (9/17/20). Clinical improvement since ED admission, will admit to observation. Doing well this am.       PLAN:   No acute surgical intervention indicated at this time.   No complaints this am. Attempt diet as tolerated.    GGC vs Ng if has issues with diet    Serial abdominal exams   Ileostomy care - strict outputs   Nephrostomy care    Home meds as appropriate   OOB, ambulate   -DVT ppx         Osmany Mayers MD  General Surgery  [facility]

## 2023-08-19 NOTE — NURSING
Nurses Note -- 4 Eyes      8/19/2023   4:46 AM      Skin assessed during: Admit      [] No Altered Skin Integrity Present    []Prevention Measures Documented      [x] Yes- Altered Skin Integrity Present or Discovered   [] LDA Added if Not in Epic (Describe Wound)   [] New Altered Skin Integrity was Present on Admit and Documented in LDA   [] Wound Image Taken    Wound Care Consulted? No    Attending Nurse: Emerita REYES    Second RN/Staff Member: Cassie BATISTA

## 2023-08-19 NOTE — CONSULTS
GENERAL SURGERY  Consultation      REASON FOR CONSULT:  small bowel obstruction      SUBJECTIVE:     HISTORY OF PRESENT ILLNESS:  Edita Riley is a 58 y.o. 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 Briefly, 9/11/20 she underwent creation of a transverse colon urinary conduit (Dr. Reynolds, Dr. Balbuena). Her post-operative course was complicated by a bowel perforation s/p extended right hemicolectomy and ileostomy creation (9/17/20). Subsequently she required IR drainage of a pelvic collection (9/23/20) and longterm IV antibiotics/antifungals. She was discharged from the hospital to an LTAC on 10/13/20. In the interval since her discharge she was started on therapeutic anticoagulation for LE DVT.     She notes several episodes of abdominal pain, decreased ostomy output and nausea with bilious emesis. She notes conservative improvement without operative intervention. She notes no concerns with left sided nephrostomy tube. Last seen by Sree on 7/26/23, antegrade nephrostogram and a loopogram 6/6/2023.  The nephrostomy tube was changed on 6/7/2023.     Patient reports 2-days of increasing epigastric abdominal pain, decreased ileostomy output and several episodes today of bilious emesis. On ED presentation, no episodes of emesis. Some ileostomy output with improvement in her abdominal pain. Afebrile, normal HR and WBC 11.    CT A/P with small dilation dilation with suspected transitioning point near umbilicus. No pneumatosis or pneumoperitoneum.     General Surgery has been consulted for evaluation of a bowel obstruction.    The patient's past surgical history is pertinent for  prior transverse colon urinary conduit, right hemicolectomy, ileostomy creation. No past MI, history of stroke at age 7 with residual speech deficits. Not currently on systemic anticoagulation, history of LE DVT after extended hospitalization in 2020.       MEDICATIONS:  Home Medications:  No current facility-administered medications on file prior to encounter.     Current Outpatient Medications on File Prior to Encounter   Medication Sig Dispense Refill    gabapentin (NEURONTIN) 100 MG capsule Take 2 capsules (200 mg total) by mouth every evening. 90 capsule 3    mirtazapine (REMERON) 30 MG tablet Take 1 tablet (30 mg total) by mouth nightly. 30 tablet 11    oxyCODONE (ROXICODONE) 5 MG immediate release tablet Take 1 tablet (5 mg total) by mouth every 12 (twelve) hours as needed for Pain. 20 tablet 0    tolterodine (DETROL LA) 2 MG Cp24 Take 2 mg by mouth once daily.       Inpatient Medications:   enoxparin  40 mg Subcutaneous Daily     Infusions:   lactated ringers 125 mL/hr at 08/19/23 0141     PRN Medications:LIDOcaine (PF) 10 mg/ml (1%), morphine, morphine, ondansetron, sodium chloride 0.9%    ALLERGIES:    Review of patient's allergies indicates:   Allergen Reactions    Bee sting [allergen ext-venom-honey bee]      Rash      Grass pollen-bermuda, standard      rash       PAST MEDICAL HISTORY:    Past Medical History:   Diagnosis Date    Abnormal mammogram 08/25/2020    Acute blood loss anemia     Acute deep vein thrombosis (DVT) of lower extremity 12/09/2020    Advance care planning 04/30/2021    Anemia due to chronic blood loss     Anemia due to chronic kidney disease     Anxiety     Bilateral ureteral obstruction 9/11/2020    Cardiovascular event risk -- low 09/14/2015    ASCVD 10-year risk 1.9% (with optimal risk factors 1.3%) as of 9/14/2015     Cervical cancer 2014    Chronic back pain     Colostomy care     Deep vein thrombosis     Depression     Diarrhea due to malabsorption 11/14/2018    Difficult  intubation     Disorder of kidney and ureter     DVT of lower extremity, bilateral 11/04/2020    Emphysema of lung 4/10/2023    Fibromyalgia     Fungemia 09/27/2020    Generalized abdominal pain 08/25/2020    GERD (gastroesophageal reflux disease)     Hemifacial spasm 09/16/2015    Hiatal hernia 2014    History of cervical cancer 10/11/2018    Hx of psychiatric care     Cymbalta, trazodone    Hypertension     Hypomagnesemia 11/21/2018    Lactose intolerance     Metastatic squamous cell carcinoma to lymph node 10/11/2018    Neuropathy due to chemotherapeutic drug 11/14/2018    Osteoarthritis of back     Peritonitis 09/22/2020    Pseudomonas urinary tract infection 04/21/2021    Psychiatric problem     Refusal of blood transfusions as patient is Christianity     Schatzki's ring 09/14/2015    Seen on outside EGD 05/2014, underwent esophageal dilatation. Bx were negative.     Seizures     Sleep stage dysfunction     Noted on PSG 06/2017; negative for obstructive sleep apnea     Stroke     Urinary tract infection associated with nephrostomy catheter 06/11/2020    Wound infection after surgery 09/24/2020       SURGICAL HISTORY:  Past Surgical History:   Procedure Laterality Date    ANTEGRADE NEPHROSTOGRAPHY Bilateral 9/28/2020    Procedure: Nephrostogram - antegrade;  Surgeon: Leslie Balbuena MD;  Location: [facility] OR [address] [place] [address];  Service: Urology;  Laterality: Bilateral;    ANTEGRADE NEPHROSTOGRAPHY Left 4/20/2021    Procedure: NEPHROSTOGRAM, ANTEGRADE;  Surgeon: Leslie Balbuena MD;  Location: [facility] OR [address] [place] [address];  Service: Urology;  Laterality: Left;    ANTEGRADE NEPHROSTOGRAPHY Right 10/27/2022    Procedure: NEPHROSTOGRAM, ANTEGRADE;  Surgeon: Chirag Russ MD;  Location: [facility] OR [address] [place] [address];  Service: Urology;  Laterality: Right;    ANTEGRADE NEPHROSTOGRAPHY Right 4/21/2023    Procedure: NEPHROSTOGRAM, ANTEGRADE;  Surgeon: Chirag Russ MD;  Location: [facility] OR 2ND FLR;  Service: Urology;  Laterality: Right;     ANTEGRADE NEPHROSTOGRAPHY Left 6/6/2023    Procedure: Nephrostogram - antegrade;  Surgeon: Leslie Balbuena MD;  Location: [facility] OR 1ST FLR;  Service: Urology;  Laterality: Left;    BILATERAL OOPHORECTOMY  2015    CHOLECYSTECTOMY  11/09/2016    Done at [facility], path showed chronic cholecystitis and gallstones    cold knife conization  2014    COLECTOMY, RIGHT  9/17/2020    Procedure: COLECTOMY, RIGHT Extended;  Surgeon: Hammad Reynolds MD;  Location: [facility] OR 2ND FLR;  Service: Colon and Rectal;;    COLONOSCOPY  2014    COLONOSCOPY N/A 10/24/2016    at [facility], Dr Gutiérrez    COLONOSCOPY N/A 5/18/2018    Procedure: COLONOSCOPY;  Surgeon: Arden Gutiérrez MD;  Location: [facility] (4TH FLR);  Service: Endoscopy;  Laterality: N/A;    COLONOSCOPY N/A 7/28/2020    Procedure: COLONOSCOPY;  Surgeon: Hammad Reynolds MD;  Location: [facility] (4TH FLR);  Service: Colon and Rectal;  Laterality: N/A;  covid test [place] 7/25    CYSTOSCOPY WITH URETEROSCOPY, RETROGRADE PYELOGRAPHY, AND INSERTION OF STENT Bilateral 3/21/2020    Procedure: CYSTOSCOPY, WITH RETROGRADE PYELOGRAM,;  Surgeon: Leslie Balbuena MD;  Location: [facility] OR 1ST FLR;  Service: Urology;  Laterality: Bilateral;    DILATION OF NEPHROSTOMY TRACT Right 10/27/2022    Procedure: DILATION, NEPHROSTOMY TRACT;  Surgeon: Chirag Russ MD;  [facility];  Service: Urology;  Laterality: Right;    ESOPHAGOGASTRODUODENOSCOPY  2014    ESOPHAGOGASTRODUODENOSCOPY  11/18/2020    ESOPHAGOGASTRODUODENOSCOPY N/A 11/18/2020    Procedure: ESOPHAGOGASTRODUODENOSCOPY (EGD);  Surgeon: Zenon Spencer MD;  Location: [facility];  Service: Endoscopy;  Laterality: N/A;    ESOPHAGOGASTRODUODENOSCOPY N/A 12/11/2020    Procedure: EGD (ESOPHAGOGASTRODUODENOSCOPY);  Surgeon: Juancho Muse MD;  Location: [facility] (2ND FLR);  Service: Endoscopy;  Laterality: N/A;    HYSTERECTOMY  2014    [facility] for cervical cancer    ILEOSTOMY  9/17/2020    Procedure: CREATION, ILEOSTOMY;   Surgeon: Hammad Reynolds MD;  Location: [facility] OR 2ND FLR;  Service: Colon and Rectal;;    LOOPOGRAM N/A 4/20/2021    Procedure: LOOPOGRAM;  Surgeon: Leslie Balbuena MD;  Location: [facility] OR 1ST FLR;  Service: Urology;  Laterality: N/A;    LOOPOGRAM N/A 6/6/2023    Procedure: LOOPOGRAM;  Surgeon: Leslie Balbuena MD;  Location: [facility] OR 1ST FLR;  Service: Urology;  Laterality: N/A;    MOBILIZATION OF SPLENIC FLEXURE N/A 9/11/2020    Procedure: MOBILIZATION, COLONIC;  Surgeon: Hammad Reynolds MD;  Location: [facility] OR 2ND FLR;  Service: Colon and Rectal;  Laterality: N/A;    NEPHROSTOGRAPHY Bilateral 4/17/2021    Procedure: Nephrostogram;  Surgeon: Celeste Surgeon;  Location: [facility];  Service: Anesthesiology;  Laterality: Bilateral;    OOPHORECTOMY      PERCUTANEOUS NEPHROLITHOTOMY Right 4/21/2023    Procedure: NEPHROLITHOTOMY, PERCUTANEOUS;  Surgeon: Chirag Russ MD;  Location: [facility] OR 2ND FLR;  Service: Urology;  Laterality: Right;  2.5 hrs    PERCUTANEOUS NEPHROSTOMY  4/21/2023    Procedure: CREATION, NEPHROSTOMY, PERCUTANEOUS with removal of existing nephrostomy tube;  Surgeon: Chirag Russ MD;  [facility];  Service: Urology;;    PYELOSCOPY Right 10/27/2022    Procedure: PYELOSCOPY;  Surgeon: Chirag Russ MD;  [facility];  Service: Urology;  Laterality: Right;    REPLACEMENT OF NEPHROSTOMY TUBE Right 10/27/2022    Procedure: REPLACEMENT, NEPHROSTOMY TUBE;  Surgeon: Chirag Russ MD;  Location: [facility] OR [facility] FLR;  Service: Urology;  Laterality: Right;    RETROPERITONEAL LYMPHADENECTOMY Right 9/17/2018    Procedure: LYMPHADENECTOMY, RETROPERITONEUM;  Surgeon: Sebas Reed MD;  Location: [facility] OR 2ND FLR;  Service: General;  Laterality: Right;  ILIAC    URETEROSCOPIC REMOVAL OF URETERIC CALCULUS Right 10/27/2022    Procedure: REMOVAL, CALCULUS, URETER, URETEROSCOPIC;  Surgeon: Chirag Russ MD;  [facility];  Service: Urology;  Laterality: Right;     URETEROSCOPY Right 10/27/2022    Procedure: URETEROSCOPY-ANTEGRADE;  Surgeon: Chirag Russ MD;  Location: [facility] OR [address] [place] [address];  Service: Urology;  Laterality: Right;       FAMILY HISTORY:  Family History   Problem Relation Age of Onset    Heart disease Sister     Heart disease Maternal Grandmother     Colon cancer Father     Esophageal cancer Father     Cancer Father         Lung-smoker    Cancer Mother         Cervical    Cervical cancer Mother     Breast cancer Maternal Aunt     Suicide Daughter         jumped from parking structure    Drug abuse Daughter     Drug abuse Daughter     Rectal cancer Neg Hx     Stomach cancer Neg Hx     Crohn's disease Neg Hx     Ulcerative colitis Neg Hx     Diabetes Neg Hx     Hypertension Neg Hx        SOCIAL HISTORY:  Social History     Tobacco Use    Smoking status: Never    Smokeless tobacco: Never   Substance Use Topics    Alcohol use: No     Alcohol/week: 0.0 standard drinks of alcohol    Drug use: No        REVIEW OF SYSTEMS:  A 10-point review of systems is negative except for the above mentioned in the HPI.    OBJECTIVE:     Most Recent Vitals:  Temp: 97.1 °F (36.2 °C) (08/19/23 0310)  Pulse: 71 (08/19/23 0310)  Resp: 14 (08/19/23 0310)  BP: (!) 136/97 (08/19/23 0310)  SpO2: 100 % (08/19/23 0310)    24-Hour Vitals:  Temp:  [97.1 °F (36.2 °C)-99.8 °F (37.7 °C)]   Pulse:  [71-90]   Resp:  [14-20]   BP: (114-137)/(63-97)   SpO2:  [98 %-100 %]     24-Hour I&O:  Intake/Output Summary (Last 24 hours) at 8/19/2023 0529  Last data filed at 8/18/2023 2352  Gross per 24 hour   Intake 2086 ml   Output --   Net 2086 ml       PHYSICAL EXAM:  AAO, NAD, well developed and well nourished.  Head normocephalic, atraumatic.  Trachea midline, neck supple.  Respirations unlabored with good inspiratory effort.  Heart regular rate and rhythm.  Abdomen soft, obese, nondistended, nontender to palpation.     Right sided ileostomy with stool/air in appliance      Left sided nephrostomy with urine  "in appliance   No peritoneal or guarding signs       LABORATORY VALUES:  Recent Labs   Lab 08/18/23 2044   WBC 11.15   HGB 12.8   HCT 42.8        Recent Labs   Lab 08/18/23 2043      K 4.0   *   CO2 19*   BUN 13   CREATININE 1.4      CALCIUM 9.1   MG 1.9   AST 19   ALT 16   ALKPHOS 133   BILITOT 0.3   PROT 7.9   ALBUMIN 3.5   PROCAL 0.03   No results for input(s): "INR", "PTT", "LABHEPA", "LACTATE", "TROPONINI", "CPK", "CPKMB", "MB", "BNP" in the last 72 hours.No results for input(s): "PH", "PCO2", "PO2", "HCO3" in the last 72 hours.    DIAGNOSTIC STUDIES:  CT: Reviewed    ASSESSMENT:     Edita Riley is a 58 y.o. female with PMH cervical cancer with a complicated surgical history from a transverse colon urinary diversion that was complicated by colon perforation with ultimate extended right hemicolectomy and end ileostomy creation (9/17/20). Clinical improvement since ED admission, will admit to observation. Discussed with patient low threshold for NGT decompression if nausea/emesis returns, otherwise for ice chips. If continues to have ostomy output, anticipate advancing diet slowly as tolerated.       PLAN:  No acute surgical intervention indicated at this time.  Admission to observation with general surgery  NPO with ice chips  mIVF  NGT if nausea/emesis  GGC 8/19 if slow return of bowel function   Serial abdominal exams  Ileostomy care - strict outputs  Nephrostomy care - will involve urology as needed for outpatient follow-up. No concerns at this time  Home meds as appropriate  OOB, ambulate      -- Bria Smith M.D  General Surgery PGY5  Pager: [phone]        "

## 2023-08-19 NOTE — PLAN OF CARE
[facility] - [facility]  Discharge Assessment    Primary Care Provider: No, Primary Doctor     Discharge Assessment (most recent)       BRIEF DISCHARGE ASSESSMENT - 08/19/23 5425          Discharge Planning    Assessment Type Discharge Planning Brief Assessment     Resource/Environmental Concerns none     Support Systems Spouse/significant other     Assistance Needed some     Equipment Currently Used at Home wheelchair;walker, rolling;colostomy/ostomy supplies     Current Living Arrangements home     Care Facility Name none     Patient/Family Anticipates Transition to home with family     Patient/Family Anticipated Services at Transition none     DME Needed Upon Discharge  other (see comments)   TBD    Discharge Plan A Home with family     Discharge Plan B Home        Physical Activity    On average, how many days per week do you engage in moderate to strenuous exercise (like a brisk walk)? 2 days     On average, how many minutes do you engage in exercise at this level? 30 min        Financial Resource Strain    How hard is it for you to pay for the very basics like food, housing, medical care, and heating? Not very hard        Housing Stability    In the last 12 months, was there a time when you were not able to pay the mortgage or rent on time? No     In the last 12 months, how many places have you lived? 2     In the last 12 months, was there a time when you did not have a steady place to sleep or slept in a shelter (including now)? No        Transportation Needs    In the past 12 months, has lack of transportation kept you from medical appointments or from getting medications? No     In the past 12 months, has lack of transportation kept you from meetings, work, or from getting things needed for daily living? No        Food Insecurity    Within the past 12 months, you worried that your food would run out before you got the money to buy more. Never true     Within the past 12 months, the food you bought just didn't  last and you didn't have money to get more. Never true        Stress    Do you feel stress - tense, restless, nervous, or anxious, or unable to sleep at night because your mind is troubled all the time - these days? To some extent        Social Connections    In a typical week, how many times do you talk on the phone with family, friends, or neighbors? Once a week     How often do you get together with friends or relatives? Three times a week     How often do you attend Religious or [employer]ology services? Never     Do you belong to any clubs or organizations such as Religious groups, unions, fraternal or athletic groups, or school groups? No     How often do you attend meetings of the clubs or organizations you belong to? Never     Are you , , , , never , or living with a partner?         Alcohol Use    Q1: How often do you have a drink containing alcohol? Never     Q2: How many drinks containing alcohol do you have on a typical day when you are drinking? Patient does not drink     Q3: How often do you have six or more drinks on one occasion? Never                       EDWARDO met with pt at bedside. Pt confirmed face sheet information. Lives with . Plans to return and will have a ride form home. Pt has a walker, wheelchair, and ostomy supplies at home. No coumadin. No dialysis. Reported that she is independent with ADLs. Good support from . [facility]. EDWARDO/LAURA to follow.    Gissel Cordero, PENNY, LCSW  [facility]end [facility] [place] [phone]

## 2023-08-19 NOTE — ED PROVIDER NOTES
Encounter Date: 8/18/2023       History     Chief Complaint   Patient presents with    Multiple complaints     Nephrostomy tube L kidney, vomiting bile in triage, abd pain, finished antibiotics last week for uti     HPI  Edita Riley is a 58-year-old female with a history of distal ureteral strictures due to radiation therapy for cervical cancer status post transverse colon conduit and bilateral nephrostomy tube complicated by MDR infections, recent UTI and hospital admission, history of depression, anxiety, anemia, DVT, fibromyalgia, hypertension, neuropathy, and status post left nephrostomy tube presenting with abdominal pain and bilious emesis.      Review of patient's allergies indicates:   Allergen Reactions    Bee sting [allergen ext-venom-honey bee]      Rash      Grass pollen-bermuda, standard      rash     Past Medical History:   Diagnosis Date    Abnormal mammogram 08/25/2020    Acute blood loss anemia     Acute deep vein thrombosis (DVT) of lower extremity 12/09/2020    Advance care planning 04/30/2021    Anemia due to chronic blood loss     Anemia due to chronic kidney disease     Anxiety     Bilateral ureteral obstruction 9/11/2020    Cardiovascular event risk -- low 09/14/2015    ASCVD 10-year risk 1.9% (with optimal risk factors 1.3%) as of 9/14/2015     Cervical cancer 2014    Chronic back pain     Colostomy care     Deep vein thrombosis     Depression     Diarrhea due to malabsorption 11/14/2018    Difficult intubation     Disorder of kidney and ureter     DVT of lower extremity, bilateral 11/04/2020    Emphysema of lung 4/10/2023    Fibromyalgia     Fungemia 09/27/2020    Generalized abdominal pain 08/25/2020    GERD (gastroesophageal reflux disease)     Hemifacial spasm 09/16/2015    Hiatal hernia 2014    History of cervical cancer 10/11/2018    Hx of psychiatric care     Cymbalta, trazodone    Hypertension     Hypomagnesemia 11/21/2018    Lactose intolerance     Metastatic squamous  cell carcinoma to lymph node 10/11/2018    Neuropathy due to chemotherapeutic drug 11/14/2018    Osteoarthritis of back     Peritonitis 09/22/2020    Pseudomonas urinary tract infection 04/21/2021    Psychiatric problem     Refusal of blood transfusions as patient is [place]     Schatzki's ring 09/14/2015    Seen on outside EGD 05/2014, underwent esophageal dilatation. Bx were negative.     Seizures     Sleep stage dysfunction     Noted on PSG 06/2017; negative for obstructive sleep apnea     Stroke     Urinary tract infection associated with nephrostomy catheter 06/11/2020    Wound infection after surgery 09/24/2020     Past Surgical History:   Procedure Laterality Date    ANTEGRADE NEPHROSTOGRAPHY Bilateral 9/28/2020    Procedure: Nephrostogram - antegrade;  Surgeon: Leslie Balbuena MD;  [facility];  Service: Urology;  Laterality: Bilateral;    ANTEGRADE NEPHROSTOGRAPHY Left 4/20/2021    Procedure: NEPHROSTOGRAM, ANTEGRADE;  Surgeon: Leslie Balbuena MD;  Location: [facility] OR 1ST FLR;  Service: Urology;  Laterality: Left;    ANTEGRADE NEPHROSTOGRAPHY Right 10/27/2022    Procedure: NEPHROSTOGRAM, ANTEGRADE;  Surgeon: Chirag Russ MD;  Location: [facility] OR 1ST FLR;  Service: Urology;  Laterality: Right;    ANTEGRADE NEPHROSTOGRAPHY Right 4/21/2023    Procedure: NEPHROSTOGRAM, ANTEGRADE;  Surgeon: Chirag Russ MD;  Location: [facility] OR 2ND FLR;  Service: Urology;  Laterality: Right;    ANTEGRADE NEPHROSTOGRAPHY Left 6/6/2023    Procedure: Nephrostogram - antegrade;  Surgeon: Leslie Balbuena MD;  Location: [facility] OR 1ST FLR;  Service: Urology;  Laterality: Left;    BILATERAL OOPHORECTOMY  2015    CHOLECYSTECTOMY  11/09/2016    Done at [facility], path showed chronic cholecystitis and gallstones    cold knife conization  2014    COLECTOMY, RIGHT  9/17/2020    Procedure: COLECTOMY, RIGHT Extended;  Surgeon: Hammad Reynolds MD;  Location: [facility] OR 2ND FLR;  Service: Colon and Rectal;;     COLONOSCOPY  2014    COLONOSCOPY N/A 10/24/2016    at [facility], Dr Gutiérrez    COLONOSCOPY N/A 5/18/2018    Procedure: COLONOSCOPY;  Surgeon: Arden Gutiérrez MD;  Location: [facility] (4TH FLR);  Service: Endoscopy;  Laterality: N/A;    COLONOSCOPY N/A 7/28/2020    Procedure: COLONOSCOPY;  Surgeon: Hammad Reynolds MD;  Location: [facility] (4TH FLR);  Service: Colon and Rectal;  Laterality: N/A;  covid test [place] 7/25    CYSTOSCOPY WITH URETEROSCOPY, RETROGRADE PYELOGRAPHY, AND INSERTION OF STENT Bilateral 3/21/2020    Procedure: CYSTOSCOPY, WITH RETROGRADE PYELOGRAM,;  Surgeon: Leslie Balbuena MD;  Location: [facility] OR 1ST FLR;  Service: Urology;  Laterality: Bilateral;    DILATION OF NEPHROSTOMY TRACT Right 10/27/2022    Procedure: DILATION, NEPHROSTOMY TRACT;  Surgeon: Chirag Russ MD;  Location: [facility] OR 1ST FLR;  Service: Urology;  Laterality: Right;    ESOPHAGOGASTRODUODENOSCOPY  2014    ESOPHAGOGASTRODUODENOSCOPY  11/18/2020    ESOPHAGOGASTRODUODENOSCOPY N/A 11/18/2020    Procedure: ESOPHAGOGASTRODUODENOSCOPY (EGD);  Surgeon: Zenon Spencer MD;  Location: [facility];  Service: Endoscopy;  Laterality: N/A;    ESOPHAGOGASTRODUODENOSCOPY N/A 12/11/2020    Procedure: EGD (ESOPHAGOGASTRODUODENOSCOPY);  Surgeon: Juancho Muse MD;  Location: [facility] (2ND FLR);  Service: Endoscopy;  Laterality: N/A;    HYSTERECTOMY  2014    [facility] for cervical cancer    ILEOSTOMY  9/17/2020    Procedure: CREATION, ILEOSTOMY;  Surgeon: Hammad Reynolds MD;  Location: [facility] OR 2ND FLR;  Service: Colon and Rectal;;    LOOPOGRAM N/A 4/20/2021    Procedure: LOOPOGRAM;  Surgeon: Leslie Balbuena MD;  Location: [facility] OR 1ST FLR;  Service: Urology;  Laterality: N/A;    LOOPOGRAM N/A 6/6/2023    Procedure: LOOPOGRAM;  Surgeon: Leslie Balbuena MD;  Location: [facility] OR 1ST FLR;  Service: Urology;  Laterality: N/A;    MOBILIZATION OF SPLENIC FLEXURE N/A 9/11/2020    Procedure: MOBILIZATION, COLONIC;  Surgeon: Hammad Reynolds MD;   Location: [facility] OR 2ND FLR;  Service: Colon and Rectal;  Laterality: N/A;    NEPHROSTOGRAPHY Bilateral 4/17/2021    Procedure: Nephrostogram;  Surgeon: Celeste Surgeon;  Location: [facility];  Service: Anesthesiology;  Laterality: Bilateral;    OOPHORECTOMY      PERCUTANEOUS NEPHROLITHOTOMY Right 4/21/2023    Procedure: NEPHROLITHOTOMY, PERCUTANEOUS;  Surgeon: Chirag Russ MD;  Location: [facility] OR 2ND FLR;  Service: Urology;  Laterality: Right;  2.5 hrs    PERCUTANEOUS NEPHROSTOMY  4/21/2023    Procedure: CREATION, NEPHROSTOMY, PERCUTANEOUS with removal of existing nephrostomy tube;  Surgeon: Chirag Russ MD;  Location: [facility] OR 2ND FLR;  Service: Urology;;    PYELOSCOPY Right 10/27/2022    Procedure: PYELOSCOPY;  Surgeon: Chirag Russ MD;  [facility];  Service: Urology;  Laterality: Right;    REPLACEMENT OF NEPHROSTOMY TUBE Right 10/27/2022    Procedure: REPLACEMENT, NEPHROSTOMY TUBE;  Surgeon: Chirag Russ MD;  [facility];  Service: Urology;  Laterality: Right;    RETROPERITONEAL LYMPHADENECTOMY Right 9/17/2018    Procedure: LYMPHADENECTOMY, RETROPERITONEUM;  Surgeon: Sebas Reed MD;  [facility];  Service: General;  Laterality: Right;  ILIAC    URETEROSCOPIC REMOVAL OF URETERIC CALCULUS Right 10/27/2022    Procedure: REMOVAL, CALCULUS, URETER, URETEROSCOPIC;  Surgeon: Chirag Russ MD;  [facility];  Service: Urology;  Laterality: Right;    URETEROSCOPY Right 10/27/2022    Procedure: URETEROSCOPY-ANTEGRADE;  Surgeon: Chirag Russ MD;  [facility];  Service: Urology;  Laterality: Right;     Family History   Problem Relation Age of Onset    Heart disease Sister     Heart disease Maternal Grandmother     Colon cancer Father     Esophageal cancer Father     Cancer Father         Lung-smoker    Cancer Mother         Cervical    Cervical cancer Mother     Breast cancer Maternal Aunt     Suicide Daughter          jumped from parking structure    Drug abuse Daughter     Drug abuse Daughter     Rectal cancer Neg Hx     Stomach cancer Neg Hx     Crohn's disease Neg Hx     Ulcerative colitis Neg Hx     Diabetes Neg Hx     Hypertension Neg Hx      Social History     Tobacco Use    Smoking status: Never    Smokeless tobacco: Never   Substance Use Topics    Alcohol use: No     Alcohol/week: 0.0 standard drinks of alcohol    Drug use: No     Review of Systems  All other systems reviewed and were negative; see HPI also for additional ROS.    Physical Exam     Initial Vitals [08/18/23 1857]   BP Pulse Resp Temp SpO2   131/77 90 20 99.8 °F (37.7 °C) 98 %      MAP       --         Physical Exam    Nursing note and vitals reviewed.      Gen/Constitutional: Interactive.  Chronically ill-appearing, moderate emotional distress  Head: Normocephalic, Atraumatic  Neck: supple, no masses or LAD, no JVD  Eyes: PERRLA, conjunctiva clear  Ears, Nose and Throat: No rhinorrhea or stridor.  Cardiac:  Regular rate, Reg Rhythm, No murmur  Pulmonary: CTA Bilat, no wheezes, rhonchi, rales.  No increased work of breathing.  GI:  Abdomen soft, diffusely tender but worse along the midline lower abdomen; ostomy and right lower abdomen, urostomy and left lower abdomen, left nephrostomy tube in place  : No CVA tenderness.  Musculoskeletal: Extremities warm, well perfused, no erythema, no edema  Skin: No rashes, cyanosis or jaundice.  Neuro: Alert and Oriented x 3; No focal motor or sensory deficits.    Psych:  Depressed mood      ED Course   Procedures  Labs Reviewed   CBC W/ AUTO DIFFERENTIAL - Abnormal; Notable for the following components:       Result Value    MCHC 29.9 (*)     RDW 15.6 (*)     Gran # (ANC) 9.1 (*)     Gran % 81.7 (*)     Lymph % 9.8 (*)     All other components within normal limits   COMPREHENSIVE METABOLIC PANEL - Abnormal; Notable for the following components:    Chloride 111 (*)     CO2 19 (*)     eGFR 43.1 (*)     All other  "components within normal limits   URINALYSIS, REFLEX TO URINE CULTURE - Abnormal; Notable for the following components:    Appearance, UA Hazy (*)     Protein, UA 1+ (*)     Occult Blood UA 2+ (*)     Leukocytes, UA 3+ (*)     All other components within normal limits    Narrative:     Specimen Source->Urine   URINALYSIS MICROSCOPIC - Abnormal; Notable for the following components:    RBC, UA 59 (*)     WBC, UA >100 (*)     WBC Clumps, UA Many (*)     All other components within normal limits    Narrative:     Specimen Source->Urine   CULTURE, URINE   MAGNESIUM   PROCALCITONIN   ISTAT LACTATE          Imaging Results              X-Ray Chest AP Portable (Final result)  Result time 08/18/23 23:34:59      Final result by Sebas Bermeo MD (08/18/23 23:34:59)                   Impression:      No detrimental change when compared with 06/07/2023.      Electronically signed by: Sebas Bermeo MD  Date:    08/18/2023  Time:    23:34               Narrative:    EXAMINATION:  XR CHEST AP PORTABLE    CLINICAL HISTORY:  Provided history is "Sepsis;  ".    TECHNIQUE:  One view of the chest.    COMPARISON:  06/07/2023.    FINDINGS:  Cardiac wires overlie the chest.  Cardiac silhouette is not enlarged.  No focal consolidation.  No sizable pleural effusion.  No pneumothorax.                                        CT Abdomen Pelvis With Contrast (Final result)  Result time 08/18/23 23:31:42      Final result by Sebas Bermeo MD (08/18/23 23:31:42)                   Impression:      Findings suggestive of partial small-bowel obstruction with transition point in the mid abdomen just beneath the anterior abdominal wall.    Postoperative changes of colon conduit with left lower quadrant colostomy and right lower quadrant ileostomy.  Persistent mild right-sided hydronephrosis.  Similar appearance of left nephrostomy tube without left-sided hydronephrosis.    Mild hepatomegaly with evidence suggestive of steatosis.    Other " stable findings above.    This report was flagged in Epic as abnormal.    Electronically signed by resident: Tony Andujar  Date:    08/18/2023  Time:    22:28    Electronically signed by: Sebas Bermeo MD  Date:    08/18/2023  Time:    23:31               Narrative:    EXAMINATION:  CT ABDOMEN PELVIS WITH CONTRAST    CLINICAL HISTORY:  Abdominal abscess/infection suspected;    TECHNIQUE:  Axial images of the abdomen and pelvis were acquired  after the use of 100 cc [id number] IV contrast. No oral contrast was administered.  Coronal and sagittal reconstructions were also obtained.    COMPARISON:  CT abdomen pelvis 05/20/2023.    FINDINGS:  LUNG BASES: Heart and pericardium are within normal limits.  Lung bases are stable including a stable small nodule versus scarring in the right middle lobe.  Right basilar subsegmental atelectasis.    HEPATOBILIARY: Liver is mildly enlarged measuring 18.2 cm craniocaudad.  Diffuse hepatic parenchymal hypoattenuation suggestive of steatosis.  No focal hepatic lesions. No biliary ductal dilatation. Normal gallbladder.    SPLEEN: No splenomegaly.    PANCREAS: No focal masses or ductal dilatation.    ADRENALS: No adrenal nodules.    KIDNEYS/URETERS: Kidneys are normal in size and attenuation.  Persistent mild right hydronephrosis, unchanged.  Postoperative changes of colon conduit with left lower quadrant colostomy.  Left nephrostomy tube appears in stable position.  No left hydroureteronephrosis.  Left kidney simple cyst.    BLADDER/PELVIC ORGANS: Bladder is poorly distended.  Uterus surgically absent.    PERITONEUM / RETROPERITONEUM: Trace abdominopelvic free fluid.  No pneumoperitoneum.  No portal venous gas.  No intra-abdominal fluid collections.    LYMPH NODES: No lymphadenopathy.    VESSELS: Unremarkable.    GI TRACT: Stomach appears normal.  Postoperative changes of right hemicolectomy and right lower quadrant ileostomy.  There are few fluid-filled loops of mildly distended small  bowel in the mid abdomen.  Small bowel measures up to 3.5 cm in maximum diameter.  Small bowel feces sign with relative transition point in the mid anterior abdomen beneath the abdominal wall below the umbilicus (axial image 109 and coronal image 129).  Mild adjacent mesenteric edema and trace free fluid.  No wall thickening or other inflammatory changes.  Postoperative changes of colon conduit with left lower quadrant colostomy/urostomy.    SOFT TISSUES: Postoperative changes in the abdominal wall.    BONES: No fractures or focal osseous lesions.                                       Medications   LIDOcaine (PF) 10 mg/ml (1%) injection 10 mg (has no administration in time range)   sodium chloride 0.9% flush 10 mL (has no administration in time range)   ondansetron disintegrating tablet 8 mg (has no administration in time range)   enoxaparin injection 40 mg (has no administration in time range)   acetaminophen tablet 650 mg (has no administration in time range)   lactated ringers bolus 1,986 mL (0 mLs Intravenous Stopped 8/18/23 2352)   droPERidol injection 1.25 mg (1.25 mg Intravenous Given 8/18/23 2030)   morphine injection 4 mg (4 mg Intravenous Given 8/18/23 2030)   ertapenem (INVANZ) 1 g in sodium chloride 0.9 % 100 mL IVPB (MB+) (0 g Intravenous Stopped 8/18/23 2211)   iohexoL (OMNIPAQUE 350) injection 100 mL (100 mLs Intravenous Given 8/18/23 2212)     Medical Decision Making  Amount and/or Complexity of Data Reviewed  Labs: ordered.  Radiology: ordered.    Risk  Prescription drug management.  Decision regarding hospitalization.                          Medical Decision Making:   History:   Old Medical Records: I decided to obtain old medical records.  Old Records Summarized: records from previous admission(s).       <> Summary of Records: Discharge summary: 6/12/23: given good renal function, no indication for inpatient placement of right nephrostomy tube for the moderate right hyrdrouteronephrosis. Per ID,  switched to ertapenem based on urine culture which grew Enterobacter and will complete 10 day total course.  Initial Assessment:   Edita Riley is a 58-year-old female with a history of distal ureteral strictures due to radiation therapy for cervical cancer status post transverse colon conduit and bilateral nephrostomy tube complicated by MDR infections, recent UTI and hospital admission, history of depression, anxiety, anemia, DVT, fibromyalgia, hypertension, neuropathy, and status post left nephrostomy tube presenting with abdominal pain and bilious emesis.  Differential Diagnosis:   Obstruction, perforation, Sepsis, bacteremia, intra-abdominal process, intra-abdominal infection, UTI, pyelonephritis, nephrostomy tube failure  Independently Interpreted Test(s):   I have ordered and independently interpreted X-rays - see prior notes.  I have ordered and independently interpreted EKG Reading(s) - see prior notes  Clinical Tests:   Lab Tests: Ordered and Reviewed  Radiological Study: Ordered and Reviewed  Medical Tests: Ordered and Reviewed    Patient initially low-grade fever with tachycardia.  The remainder vital signs are stable.  Physical exam findings remarkable for tenderness along the mid abdomen.  She does have a ostomy with no diarrhea or bloody discharge on the right part of the abdomen.  She is acutely tender in the mid abdomen along her incision line.  She is a urostomy bag draining yellow urine.  She also has a left nephrostomy tube that appears to be draining.  There is no blood in the urine or in the nephrostomy bag.  Her pain and symptoms are acute and severe.  She she reports bilious emesis times several episodes today.  Her last admission showed ESBL urine infection along with sepsis.  Her initial labs show CBC with a leukocytosis of 11.5, with a lactic acidosis of 2.11 the remainder of her labs are pending, [facility] with final  disposition pending labs, imaging, re-evaluation.    Complexity:  High-risk    Clinical Impression:   Final diagnoses:  [R10.9] Abdominal pain  [K56.609] SBO (small bowel obstruction)        ED Disposition Condition    Observation                Juan Miguel Kumar DO, FAAEM  Emergency Staff Physician   Dept of Emergency Medicine   [facility]  Spectralink: [id number]        Disclaimer: This note has been generated using voice-recognition software. There may be typographical errors that have been missed during proof-reading.       Juan Miguel Kumar DO  08/19/23 1530

## 2023-08-19 NOTE — ASSESSMENT & PLAN NOTE
Edita Riley is a 58 y.o. female with PMH cervical cancer with a complicated surgical history from a transverse colon urinary diversion that was complicated by colon perforation with ultimate extended right hemicolectomy and end ileostomy creation (9/17/20). Clinical improvement since ED admission, will admit to observation. Doing well this am.       PLAN:   No acute surgical intervention indicated at this time.   No complaints this am. Attempt diet as tolerated.    GGC vs Ng if has issues with diet    Serial abdominal exams   Ileostomy care - strict outputs   Nephrostomy care    Home meds as appropriate   OOB, ambulate   -DVT ppx

## 2023-08-20 VITALS
HEART RATE: 69 BPM | TEMPERATURE: 96 F | HEIGHT: 69 IN | WEIGHT: 194 LBS | OXYGEN SATURATION: 96 % | SYSTOLIC BLOOD PRESSURE: 118 MMHG | RESPIRATION RATE: 16 BRPM | BODY MASS INDEX: 28.73 KG/M2 | DIASTOLIC BLOOD PRESSURE: 59 MMHG

## 2023-08-20 LAB
BACTERIA UR CULT: NORMAL
BACTERIA UR CULT: NORMAL

## 2023-08-20 PROCEDURE — G0378 HOSPITAL OBSERVATION PER HR: HCPCS

## 2023-08-20 NOTE — HOSPITAL COURSE
Pt admitted for possible SBO. Started having ileostomy output and was able to tolerate a diet without issue. She is stable and ready for discharge on hospital day 2.     General: No acute distress  HEENT: atraumatic  Resp: No resp distress, effort normal, normal chest movements   Cardiac: Normal rate  Abdomen: Abdominal surgical scars present well healed, Soft, NT, ND, ileostomy functioning, ileal conduit in place, small drainage from umbilicus   Skin: warm and dry   Neuro: AOX4, at baseline  Psych: Behavior normal

## 2023-08-20 NOTE — NURSING
Pt received copy of discharged papers instructions. No changes to medication return to clinic as ordered in d/c papers .Pt denies pain at this time. Pt educated on signs and symptoms of when to call the doctor. All belongings with the patient . Pt verbalized understanding. .  to call for transport to home .

## 2023-08-20 NOTE — PLAN OF CARE
Resident to place ambulatory referral for IM to establish care with PCP.Family to provide transportation home.   08/20/23 1354   Final Note   Assessment Type Final Discharge Note   Anticipated Discharge Disposition Home   Hospital Resources/Appts/Education Provided Provided patient/caregiver with written discharge plan information   Post-Acute Status   Discharge Delays (!) Post-Acute Set-up     [address] - Surgery  Discharge Final Note    Primary Care Provider: No, Primary Doctor    Expected Discharge Date: 8/20/2023    Final Discharge Note (most recent)       Final Note - 08/20/23 1354          Final Note    Assessment Type Final Discharge Note (P)      Anticipated Discharge Disposition Home or Self Care (P)      Hospital Resources/Appts/Education Provided Provided patient/caregiver with written discharge plan information (P)         Post-Acute Status    Discharge Delays Post-Acute Set-up (P)                      Important Message from Medicare

## 2023-08-20 NOTE — DISCHARGE SUMMARY
[facility]  General Surgery  Discharge Summary      Patient Name: Edita Riley  MRN: [id number]  Admission Date: 8/18/2023  Hospital Length of Stay: 0 days  Discharge Date and Time:  08/20/2023 12:57 PM  Attending Physician: Sidney Swann MD   Discharging Provider: Osmany Mayers MD  Primary Care Provider: Delma, Primary Doctor    HPI:   Edita Riley is a 58 y.o. 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 Briefly, 9/11/20 she underwent creation of a transverse colon urinary conduit (Dr. Reynolds, Dr. Balbuena). Her post-operative course was complicated by a bowel perforation s/p extended right hemicolectomy and ileostomy creation (9/17/20). Subsequently she required IR drainage of a pelvic collection (9/23/20) and longterm IV antibiotics/antifungals. She was discharged from the hospital to an LTAC on 10/13/20. In the interval since her discharge she was started on therapeutic anticoagulation for LE DVT.      She notes several episodes of abdominal pain, decreased ostomy output and nausea with bilious emesis. She notes conservative improvement without operative intervention. She notes no concerns with left sided nephrostomy tube. Last seen by Sree on 7/26/23, antegrade nephrostogram and a loopogram 6/6/2023.  The nephrostomy tube was changed on 6/7/2023.      Patient reports 2-days of increasing epigastric abdominal pain, decreased ileostomy output and several episodes today of bilious emesis. On ED presentation, no episodes of emesis. Some ileostomy output with improvement in her abdominal pain. Afebrile, normal HR and WBC 11.     CT A/P with small  dilation dilation with suspected transitioning point near umbilicus. No pneumatosis or pneumoperitoneum.      General Surgery has been consulted for evaluation of a bowel obstruction.     The patient's past surgical history is pertinent for prior transverse colon urinary conduit, right hemicolectomy, ileostomy creation. No past MI, history of stroke at age 7 with residual speech deficits. Not currently on systemic anticoagulation, history of LE DVT after extended hospitalization in 2020.   * No surgery found *      Indwelling Lines/Drains at time of discharge:   Lines/Drains/Airways     Drain  Duration                Urostomy 09/11/20 0000 ileal conduit LLQ 1073 days         Ileostomy 09/18/20 RLQ 1066 days         Nephrostomy 06/07/23 1349 Left 12 Fr. 73 days              Hospital Course: Pt admitted for possible SBO. Started having ileostomy output and was able to tolerate a diet without issue.She never needed NGT.  She is stable and ready for discharge on hospital day 2.     General: No acute distress  HEENT: atraumatic  Resp: No resp distress, effort normal, normal chest movements   Cardiac: Normal rate  Abdomen: Abdominal surgical scars present well healed, Soft, NT, ND, ileostomy functioning, ileal conduit in place, small drainage from umbilicus   Skin: warm and dry   Neuro: AOX4, at baseline  Psych: Behavior normal         Goals of Care Treatment Preferences:  Code Status: Full Code      Consults:   Consults (From admission, onward)        Status Ordering Provider     Inpatient consult to General surgery  Once        Provider:  (Not yet assigned)    Completed [facility]          Significant Diagnostic Studies: N/A  See EMR    Pending Diagnostic Studies:     None        Final Active Diagnoses:    Diagnosis Date Noted POA    PRINCIPAL PROBLEM:  Small bowel obstruction [K56.609] 08/19/2023 Yes      Problems Resolved During this Admission:      Discharged Condition: stable    Disposition: Home or Self  Care    Follow Up:    Patient Instructions:      Diet Adult Regular     Notify your health care provider if you experience any of the following:  temperature >100.4     Notify your health care provider if you experience any of the following:  persistent nausea and vomiting or diarrhea     Notify your health care provider if you experience any of the following:  redness, tenderness, or signs of infection (pain, swelling, redness, odor or green/yellow discharge around incision site)     Notify your health care provider if you experience any of the following:  difficulty breathing or increased cough     Medications:  Reconciled Home Medications:      Medication List      CONTINUE taking these medications    gabapentin 100 MG capsule  Commonly known as: NEURONTIN  Take 2 capsules (200 mg total) by mouth every evening.     mirtazapine 30 MG tablet  Commonly known as: REMERON  Take 1 tablet (30 mg total) by mouth nightly.     oxyCODONE 5 MG immediate release tablet  Commonly known as: ROXICODONE  Take 1 tablet (5 mg total) by mouth every 12 (twelve) hours as needed for Pain.     tolterodine 2 MG Cp24  Commonly known as: DETROL LA  Take 2 mg by mouth once daily.          Time spent on the discharge of patient: 10 minutes    Osmany Mayers MD  General Surgery  [facility] - Surgery

## 2023-08-20 NOTE — PLAN OF CARE
[facility]  Discharge Final Note    Primary Care Provider: No, Primary Doctor    Expected Discharge Date: 8/20/2023    Final Discharge Note (most recent)       Final Note - 08/20/23 1652          Final Note    Assessment Type Final Discharge Note     Anticipated Discharge Disposition Home or Self Care     Hospital Resources/Appts/Education Provided Provided patient/caregiver with written discharge plan information        Post-Acute Status    Post-Acute Authorization Other     Other Status No Post-Acute Service Needs     Discharge Delays None known at this time                     Important Message from Medicare      Pt provided with Lyft ride home.     PENNY Villegas, LCSW  [facility]  [facility] [phone]

## 2023-08-21 ENCOUNTER — PATIENT OUTREACH (OUTPATIENT)
Dept: ADMINISTRATIVE | Facility: OTHER | Age: 58
End: 2023-08-21
Payer: MEDICAID

## 2023-08-23 LAB
BACTERIA BLD CULT: NORMAL
BACTERIA BLD CULT: NORMAL

## 2023-09-09 ENCOUNTER — HOSPITAL ENCOUNTER (INPATIENT)
Facility: HOSPITAL | Age: 58
LOS: 5 days | Discharge: HOME | DRG: 690 | End: 2023-09-14
Attending: EMERGENCY MEDICINE | Admitting: HOSPITALIST
Payer: MEDICAID

## 2023-09-09 DIAGNOSIS — N30.00 ACUTE CYSTITIS WITHOUT HEMATURIA: ICD-10-CM

## 2023-09-09 DIAGNOSIS — Z86.59 HISTORY OF SUICIDAL IDEATION: ICD-10-CM

## 2023-09-09 DIAGNOSIS — R11.2 NAUSEA & VOMITING: ICD-10-CM

## 2023-09-09 DIAGNOSIS — R07.9 CHEST PAIN: ICD-10-CM

## 2023-09-09 DIAGNOSIS — N12 PYELONEPHRITIS: Primary | ICD-10-CM

## 2023-09-09 DIAGNOSIS — R19.7 DIARRHEA, UNSPECIFIED TYPE: ICD-10-CM

## 2023-09-09 DIAGNOSIS — F43.10 PTSD (POST-TRAUMATIC STRESS DISORDER): Chronic | ICD-10-CM

## 2023-09-09 DIAGNOSIS — N17.9 AKI (ACUTE KIDNEY INJURY): ICD-10-CM

## 2023-09-09 DIAGNOSIS — E86.0 DEHYDRATION: ICD-10-CM

## 2023-09-09 DIAGNOSIS — F33.1 MAJOR DEPRESSIVE DISORDER, RECURRENT EPISODE, MODERATE: ICD-10-CM

## 2023-09-09 DIAGNOSIS — F41.1 GENERALIZED ANXIETY DISORDER: Chronic | ICD-10-CM

## 2023-09-09 LAB
ALBUMIN SERPL BCP-MCNC: 3.9 G/DL (ref 3.5–5.2)
ALP SERPL-CCNC: 132 U/L (ref 55–135)
ALT SERPL W/O P-5'-P-CCNC: 36 U/L (ref 10–44)
ANION GAP SERPL CALC-SCNC: 20 MMOL/L (ref 8–16)
AST SERPL-CCNC: 21 U/L (ref 10–40)
BACTERIA #/AREA URNS AUTO: ABNORMAL /HPF
BILIRUB SERPL-MCNC: 0.5 MG/DL (ref 0.1–1)
BILIRUB UR QL STRIP: NEGATIVE
BUN SERPL-MCNC: 66 MG/DL (ref 6–20)
CALCIUM SERPL-MCNC: 10.2 MG/DL (ref 8.7–10.5)
CHLORIDE SERPL-SCNC: 105 MMOL/L (ref 95–110)
CLARITY UR REFRACT.AUTO: ABNORMAL
CO2 SERPL-SCNC: 10 MMOL/L (ref 23–29)
COLOR UR AUTO: YELLOW
CREAT SERPL-MCNC: 3.7 MG/DL (ref 0.5–1.4)
ERYTHROCYTE [DISTWIDTH] IN BLOOD BY AUTOMATED COUNT: 14.9 % (ref 11.5–14.5)
EST. GFR  (NO RACE VARIABLE): 13.4 ML/MIN/1.73 M^2
GLUCOSE SERPL-MCNC: 145 MG/DL (ref 70–110)
GLUCOSE UR QL STRIP: NEGATIVE
HCT VFR BLD AUTO: 52.6 % (ref 37–48.5)
HGB BLD-MCNC: 15.9 G/DL (ref 12–16)
HGB UR QL STRIP: ABNORMAL
HYALINE CASTS UR QL AUTO: 0 /LPF
KETONES UR QL STRIP: NEGATIVE
LEUKOCYTE ESTERASE UR QL STRIP: ABNORMAL
LIPASE SERPL-CCNC: 40 U/L (ref 4–60)
MCH RBC QN AUTO: 26.7 PG (ref 27–31)
MCHC RBC AUTO-ENTMCNC: 30.2 G/DL (ref 32–36)
MCV RBC AUTO: 88 FL (ref 82–98)
MICROSCOPIC COMMENT: ABNORMAL
NITRITE UR QL STRIP: NEGATIVE
OSMOLALITY SERPL: 316 MOSM/KG (ref 275–295)
OSMOLALITY UR: 339 MOSM/KG (ref 50–1200)
PH UR STRIP: 7 [PH] (ref 5–8)
PLATELET # BLD AUTO: 218 K/UL (ref 150–450)
PMV BLD AUTO: 11 FL (ref 9.2–12.9)
POCT GLUCOSE: 120 MG/DL (ref 70–110)
POCT GLUCOSE: 130 MG/DL (ref 70–110)
POTASSIUM SERPL-SCNC: 4.3 MMOL/L (ref 3.5–5.1)
PROT SERPL-MCNC: 10 G/DL (ref 6–8.4)
PROT UR QL STRIP: ABNORMAL
RBC # BLD AUTO: 5.95 M/UL (ref 4–5.4)
RBC #/AREA URNS AUTO: 8 /HPF (ref 0–4)
SODIUM SERPL-SCNC: 135 MMOL/L (ref 136–145)
SODIUM UR-SCNC: 14 MMOL/L (ref 20–250)
SP GR UR STRIP: 1.01 (ref 1–1.03)
URN SPEC COLLECT METH UR: ABNORMAL
WBC # BLD AUTO: 11.03 K/UL (ref 3.9–12.7)
WBC #/AREA URNS AUTO: >100 /HPF (ref 0–5)
WBC CLUMPS UR QL AUTO: ABNORMAL

## 2023-09-09 PROCEDURE — 80053 COMPREHEN METABOLIC PANEL: CPT | Performed by: EMERGENCY MEDICINE

## 2023-09-09 PROCEDURE — 85027 COMPLETE CBC AUTOMATED: CPT | Performed by: EMERGENCY MEDICINE

## 2023-09-09 PROCEDURE — 11000001 HC ACUTE MED/SURG PRIVATE ROOM

## 2023-09-09 PROCEDURE — 25000003 PHARM REV CODE 250: Performed by: EMERGENCY MEDICINE

## 2023-09-09 PROCEDURE — 93010 EKG 12-LEAD: ICD-10-PCS | Mod: ,,, | Performed by: INTERNAL MEDICINE

## 2023-09-09 PROCEDURE — 21400001 HC TELEMETRY ROOM

## 2023-09-09 PROCEDURE — 83690 ASSAY OF LIPASE: CPT | Performed by: EMERGENCY MEDICINE

## 2023-09-09 PROCEDURE — 81001 URINALYSIS AUTO W/SCOPE: CPT | Performed by: EMERGENCY MEDICINE

## 2023-09-09 PROCEDURE — 87040 BLOOD CULTURE FOR BACTERIA: CPT | Mod: 59 | Performed by: EMERGENCY MEDICINE

## 2023-09-09 PROCEDURE — 87186 SC STD MICRODIL/AGAR DIL: CPT | Mod: 59 | Performed by: EMERGENCY MEDICINE

## 2023-09-09 PROCEDURE — 87086 URINE CULTURE/COLONY COUNT: CPT | Performed by: EMERGENCY MEDICINE

## 2023-09-09 PROCEDURE — 87077 CULTURE AEROBIC IDENTIFY: CPT | Performed by: EMERGENCY MEDICINE

## 2023-09-09 PROCEDURE — 99285 EMERGENCY DEPT VISIT HI MDM: CPT | Mod: 25

## 2023-09-09 PROCEDURE — 83935 ASSAY OF URINE OSMOLALITY: CPT

## 2023-09-09 PROCEDURE — 96365 THER/PROPH/DIAG IV INF INIT: CPT

## 2023-09-09 PROCEDURE — 93005 ELECTROCARDIOGRAM TRACING: CPT

## 2023-09-09 PROCEDURE — 93010 ELECTROCARDIOGRAM REPORT: CPT | Mod: ,,, | Performed by: INTERNAL MEDICINE

## 2023-09-09 PROCEDURE — 87088 URINE BACTERIA CULTURE: CPT | Performed by: EMERGENCY MEDICINE

## 2023-09-09 PROCEDURE — 84300 ASSAY OF URINE SODIUM: CPT

## 2023-09-09 PROCEDURE — 83930 ASSAY OF BLOOD OSMOLALITY: CPT

## 2023-09-09 PROCEDURE — 96375 TX/PRO/DX INJ NEW DRUG ADDON: CPT

## 2023-09-09 PROCEDURE — 63600175 PHARM REV CODE 636 W HCPCS: Performed by: EMERGENCY MEDICINE

## 2023-09-09 PROCEDURE — 82570 ASSAY OF URINE CREATININE: CPT

## 2023-09-09 RX ORDER — TALC
6 POWDER (GRAM) TOPICAL NIGHTLY PRN
Status: DISCONTINUED | OUTPATIENT
Start: 2023-09-09 | End: 2023-09-14 | Stop reason: HOSPADM

## 2023-09-09 RX ORDER — HEPARIN SODIUM 5000 [USP'U]/ML
5000 INJECTION, SOLUTION INTRAVENOUS; SUBCUTANEOUS EVERY 8 HOURS
Status: DISCONTINUED | OUTPATIENT
Start: 2023-09-10 | End: 2023-09-10

## 2023-09-09 RX ORDER — ONDANSETRON 2 MG/ML
4 INJECTION INTRAMUSCULAR; INTRAVENOUS
Status: COMPLETED | OUTPATIENT
Start: 2023-09-09 | End: 2023-09-09

## 2023-09-09 RX ORDER — SODIUM CHLORIDE 0.9 % (FLUSH) 0.9 %
10 SYRINGE (ML) INJECTION EVERY 12 HOURS PRN
Status: DISCONTINUED | OUTPATIENT
Start: 2023-09-09 | End: 2023-09-14 | Stop reason: HOSPADM

## 2023-09-09 RX ORDER — GLUCAGON 1 MG
1 KIT INJECTION
Status: DISCONTINUED | OUTPATIENT
Start: 2023-09-09 | End: 2023-09-14 | Stop reason: HOSPADM

## 2023-09-09 RX ORDER — NALOXONE HCL 0.4 MG/ML
0.02 VIAL (ML) INJECTION
Status: DISCONTINUED | OUTPATIENT
Start: 2023-09-09 | End: 2023-09-14 | Stop reason: HOSPADM

## 2023-09-09 RX ORDER — MORPHINE SULFATE 4 MG/ML
4 INJECTION, SOLUTION INTRAMUSCULAR; INTRAVENOUS
Status: COMPLETED | OUTPATIENT
Start: 2023-09-09 | End: 2023-09-09

## 2023-09-09 RX ORDER — IBUPROFEN 200 MG
24 TABLET ORAL
Status: DISCONTINUED | OUTPATIENT
Start: 2023-09-09 | End: 2023-09-14 | Stop reason: HOSPADM

## 2023-09-09 RX ORDER — IBUPROFEN 200 MG
16 TABLET ORAL
Status: DISCONTINUED | OUTPATIENT
Start: 2023-09-09 | End: 2023-09-14 | Stop reason: HOSPADM

## 2023-09-09 RX ORDER — POLYETHYLENE GLYCOL 3350 17 G/17G
17 POWDER, FOR SOLUTION ORAL 2 TIMES DAILY PRN
Status: CANCELLED | OUTPATIENT
Start: 2023-09-09

## 2023-09-09 RX ORDER — INSULIN ASPART 100 [IU]/ML
0-5 INJECTION, SOLUTION INTRAVENOUS; SUBCUTANEOUS
Status: DISCONTINUED | OUTPATIENT
Start: 2023-09-09 | End: 2023-09-14 | Stop reason: HOSPADM

## 2023-09-09 RX ADMIN — SODIUM CHLORIDE 500 ML: 9 INJECTION, SOLUTION INTRAVENOUS at 11:09

## 2023-09-09 RX ADMIN — CEFTRIAXONE 2 G: 2 INJECTION, POWDER, FOR SOLUTION INTRAMUSCULAR; INTRAVENOUS at 06:09

## 2023-09-09 RX ADMIN — MORPHINE SULFATE 4 MG: 4 INJECTION INTRAVENOUS at 02:09

## 2023-09-09 RX ADMIN — ERTAPENEM 500 MG: 1 INJECTION INTRAMUSCULAR; INTRAVENOUS at 11:09

## 2023-09-09 RX ADMIN — ONDANSETRON 4 MG: 2 INJECTION INTRAMUSCULAR; INTRAVENOUS at 02:09

## 2023-09-09 NOTE — ED PROVIDER NOTES
Emergency Department Encounter  Provider Note    Edita Riley  [id number]  9/9/2023    Evaluation:    History Acquisition:     Chief Complaint   Patient presents with    Abdominal Pain     Diffuse abd pain x 2 weeks; +n/v/d, generalized weakness; has colostomy, urostomy, and nephrostomy tubes; admission for SBO last month       History of Present Illness:  Edita Riley is a 58 y.o. 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), Difficult intubation, Disorder of kidney and ureter, DVT of lower extremity, bilateral (11/04/2020), Emphysema of lung (4/10/2023), Fibromyalgia, Fungemia (09/27/2020), Generalized abdominal pain (08/25/2020), GERD (gastroesophageal reflux disease), Hemifacial spasm (09/16/2015), Hiatal hernia (2014), History of cervical cancer (10/11/2018), psychiatric care, Hypertension, Hypomagnesemia (11/21/2018), Lactose intolerance, Metastatic squamous cell carcinoma to lymph node (10/11/2018), Neuropathy due to chemotherapeutic drug (11/14/2018), Osteoarthritis of back, Peritonitis (09/22/2020), Pseudomonas urinary tract infection (04/21/2021), Psychiatric problem, Refusal of blood transfusions as patient is Yarsanism, Schatzki's ring (09/14/2015), Seizures, Sleep stage dysfunction, Stroke, Urinary tract infection associated with nephrostomy catheter (06/11/2020), and Wound infection after surgery (09/24/2020).    The patient presents to the ED due to abdominal pain.   Patient reports symptoms started several weeks  ago. She describes generalized pain associated with N/V/D. She states her  has similar symptoms at home, and she feels as though she got her symptoms from him.  She reports fatigue and generalized weakness. She denies any known fever. No associated CP, SOB, blood in stool or emesis, or any other concerns. .    Additional historians utilized:  none    Prior medical records were reviewed:   Admitted 8/18-8/20 for abdominal pain, N/V. Surgery consulted due to possible bowel obstruction. Placed in observation and improved without NGT or surgical intervention.     The patient's list of active medical problems, social history, medications, and allergies as documented has been reviewed.     Past Medical History:   Diagnosis Date    Abnormal mammogram 08/25/2020    Acute blood loss anemia     Acute deep vein thrombosis (DVT) of lower extremity 12/09/2020    Advance care planning 04/30/2021    Anemia due to chronic blood loss     Anemia due to chronic kidney disease     Anxiety     Bilateral ureteral obstruction 9/11/2020    Cardiovascular event risk -- low 09/14/2015    ASCVD 10-year risk 1.9% (with optimal risk factors 1.3%) as of 9/14/2015     Cervical cancer 2014    Chronic back pain     Colostomy care     Deep vein thrombosis     Depression     Diarrhea due to malabsorption 11/14/2018    Difficult intubation     Disorder of kidney and ureter     DVT of lower extremity, bilateral 11/04/2020    Emphysema of lung 4/10/2023    Fibromyalgia     Fungemia 09/27/2020    Generalized abdominal pain 08/25/2020    GERD (gastroesophageal reflux disease)     Hemifacial spasm 09/16/2015    Hiatal hernia 2014    History of cervical cancer 10/11/2018    Hx of psychiatric care     Cymbalta, trazodone    Hypertension     Hypomagnesemia 11/21/2018    Lactose intolerance     Metastatic squamous cell carcinoma to lymph node 10/11/2018    Neuropathy due to chemotherapeutic drug 11/14/2018    Osteoarthritis of back     Peritonitis  09/22/2020    Pseudomonas urinary tract infection 04/21/2021    Psychiatric problem     Refusal of blood transfusions as patient is Jehovah's witness     Schatzki's ring 09/14/2015    Seen on outside EGD 05/2014, underwent esophageal dilatation. Bx were negative.     Seizures     Sleep stage dysfunction     Noted on PSG 06/2017; negative for obstructive sleep apnea     Stroke     Urinary tract infection associated with nephrostomy catheter 06/11/2020    Wound infection after surgery 09/24/2020     Past Surgical History:   Procedure Laterality Date    ANTEGRADE NEPHROSTOGRAPHY Bilateral 9/28/2020    Procedure: Nephrostogram - antegrade;  Surgeon: Leslie Balbuena MD;  Location: [facility] OR 1ST FLR;  Service: Urology;  Laterality: Bilateral;    ANTEGRADE NEPHROSTOGRAPHY Left 4/20/2021    Procedure: NEPHROSTOGRAM, ANTEGRADE;  Surgeon: Leslie Balbuena MD;  Location: [facility] OR 1ST FLR;  Service: Urology;  Laterality: Left;    ANTEGRADE NEPHROSTOGRAPHY Right 10/27/2022    Procedure: NEPHROSTOGRAM, ANTEGRADE;  Surgeon: Chirag Russ MD;  Location: [facility] OR 1ST FLR;  Service: Urology;  Laterality: Right;    ANTEGRADE NEPHROSTOGRAPHY Right 4/21/2023    Procedure: NEPHROSTOGRAM, ANTEGRADE;  Surgeon: Chirag Russ MD;  Location: [facility] OR 2ND FLR;  Service: Urology;  Laterality: Right;    ANTEGRADE NEPHROSTOGRAPHY Left 6/6/2023    Procedure: Nephrostogram - antegrade;  Surgeon: Leslie Balbuena MD;  Location: [facility] OR 1ST FLR;  Service: Urology;  Laterality: Left;    BILATERAL OOPHORECTOMY  2015    CHOLECYSTECTOMY  11/09/2016    Done at [facility], path showed chronic cholecystitis and gallstones    cold knife conization  2014    COLECTOMY, RIGHT  9/17/2020    Procedure: COLECTOMY, RIGHT Extended;  Surgeon: Hammad Reynolds MD;  Location: [facility] OR 2ND FLR;  Service: Colon and Rectal;;    COLONOSCOPY  2014    COLONOSCOPY N/A 10/24/2016    at [facility], Dr Thomas    COLONOSCOPY N/A 5/18/2018    Procedure: COLONOSCOPY;  Surgeon:  Arden Gutiérrez MD;  Location: [facility] (4TH FLR);  Service: Endoscopy;  Laterality: N/A;    COLONOSCOPY N/A 7/28/2020    Procedure: COLONOSCOPY;  Surgeon: Hammad Reynolds MD;  Location: [facility] (4TH FLR);  Service: Colon and Rectal;  Laterality: N/A;  covid test [place] 7/25    CYSTOSCOPY WITH URETEROSCOPY, RETROGRADE PYELOGRAPHY, AND INSERTION OF STENT Bilateral 3/21/2020    Procedure: CYSTOSCOPY, WITH RETROGRADE PYELOGRAM,;  Surgeon: Leslie Balbuena MD;  Location: [facility] OR 1ST FLR;  Service: Urology;  Laterality: Bilateral;    DILATION OF NEPHROSTOMY TRACT Right 10/27/2022    Procedure: DILATION, NEPHROSTOMY TRACT;  Surgeon: Chirag Russ MD;  Location: [facility] OR 1ST FLR;  Service: Urology;  Laterality: Right;    ESOPHAGOGASTRODUODENOSCOPY  2014    ESOPHAGOGASTRODUODENOSCOPY  11/18/2020    ESOPHAGOGASTRODUODENOSCOPY N/A 11/18/2020    Procedure: ESOPHAGOGASTRODUODENOSCOPY (EGD);  Surgeon: Zenon Spencer MD;  Location: [facility];  Service: Endoscopy;  Laterality: N/A;    ESOPHAGOGASTRODUODENOSCOPY N/A 12/11/2020    Procedure: EGD (ESOPHAGOGASTRODUODENOSCOPY);  Surgeon: Juancho Muse MD;  Location: [facility] (2ND FLR);  Service: Endoscopy;  Laterality: N/A;    HYSTERECTOMY  2014    [facility] for cervical cancer    ILEOSTOMY  9/17/2020    Procedure: CREATION, ILEOSTOMY;  Surgeon: Hammad Reynolds MD;  Location: [facility] OR 2ND FLR;  Service: Colon and Rectal;;    LOOPOGRAM N/A 4/20/2021    Procedure: LOOPOGRAM;  Surgeon: Leslie Balbuena MD;  Location: [facility] OR 1ST FLR;  Service: Urology;  Laterality: N/A;    LOOPOGRAM N/A 6/6/2023    Procedure: LOOPOGRAM;  Surgeon: Leslie Balbuena MD;  Location: [facility] OR 1ST FLR;  Service: Urology;  Laterality: N/A;    MOBILIZATION OF SPLENIC FLEXURE N/A 9/11/2020    Procedure: MOBILIZATION, COLONIC;  Surgeon: Hammad Reynolds MD;  Location: [facility] OR 2ND FLR;  Service: Colon and Rectal;  Laterality: N/A;    NEPHROSTOGRAPHY Bilateral 4/17/2021    Procedure: Nephrostogram;   Surgeon: Celeste Surgeon;  Location: [facility];  Service: Anesthesiology;  Laterality: Bilateral;    OOPHORECTOMY      PERCUTANEOUS NEPHROLITHOTOMY Right 4/21/2023    Procedure: NEPHROLITHOTOMY, PERCUTANEOUS;  Surgeon: Chirag Russ MD;  Location: [facility] OR 2ND FLR;  Service: Urology;  Laterality: Right;  2.5 hrs    PERCUTANEOUS NEPHROSTOMY  4/21/2023    Procedure: CREATION, NEPHROSTOMY, PERCUTANEOUS with removal of existing nephrostomy tube;  Surgeon: Chirag Russ MD;  [facility];  Service: Urology;;    PYELOSCOPY Right 10/27/2022    Procedure: PYELOSCOPY;  Surgeon: Chirag Russ MD;  Location: [facility] OR [address] [place] [address];  Service: Urology;  Laterality: Right;    REPLACEMENT OF NEPHROSTOMY TUBE Right 10/27/2022    Procedure: REPLACEMENT, NEPHROSTOMY TUBE;  Surgeon: Chirag Russ MD;  Location: [facility] OR [address] [place] [address];  Service: Urology;  Laterality: Right;    RETROPERITONEAL LYMPHADENECTOMY Right 9/17/2018    Procedure: LYMPHADENECTOMY, RETROPERITONEUM;  Surgeon: Sebas Reed MD;  [facility];  Service: General;  Laterality: Right;  ILIAC    URETEROSCOPIC REMOVAL OF URETERIC CALCULUS Right 10/27/2022    Procedure: REMOVAL, CALCULUS, URETER, URETEROSCOPIC;  Surgeon: Chirag Russ MD;  Location: [facility] OR [address] [place] [address];  Service: Urology;  Laterality: Right;    URETEROSCOPY Right 10/27/2022    Procedure: URETEROSCOPY-ANTEGRADE;  Surgeon: Chirag Russ MD;  Location: [facility] OR [address] [place] [address];  Service: Urology;  Laterality: Right;     Family History   Problem Relation Age of Onset    Heart disease Sister     Heart disease Maternal Grandmother     Colon cancer Father     Esophageal cancer Father     Cancer Father         Lung-smoker    Cancer Mother         Cervical    Cervical cancer Mother     Breast cancer Maternal Aunt     Suicide Daughter         jumped from parking structure    Drug abuse Daughter     Drug abuse Daughter     Rectal cancer Neg Hx     Stomach cancer Neg Hx     Crohn's  disease Neg Hx     Ulcerative colitis Neg Hx     Diabetes Neg Hx     Hypertension Neg Hx      Social History     Socioeconomic History    Marital status:      Spouse name: Hammad    Number of children: 2   Tobacco Use    Smoking status: Never    Smokeless tobacco: Never   Substance and Sexual Activity    Alcohol use: No     Alcohol/week: 0.0 standard drinks of alcohol    Drug use: No    Sexual activity: Yes     Partners: Male     Birth control/protection: None     Comment:  19 years since    Social History Narrative    , twin daughters (1  2018), disabled due to childhood stroke, [employer] soph[employer]     Social Determinants of Health     Financial Resource Strain: Low Risk  (2023)    Overall Financial Resource Strain (CARDIA)     Difficulty of Paying Living Expenses: Not very hard   Food Insecurity: No Food Insecurity (2023)    Hunger Vital Sign     Worried About Running Out of Food in the Last Year: Never true     Ran Out of Food in the Last Year: Never true   Transportation Needs: No Transportation Needs (2023)    PRAPARE - Transportation     Lack of Transportation (Medical): No     Lack of Transportation (Non-Medical): No   Physical Activity: Insufficiently Active (2023)    Exercise Vital Sign     Days of Exercise per Week: 2 days     Minutes of Exercise per Session: 30 min   Stress: Stress Concern Present (2023)    [employer] [place] [employer] - Occupational Stress Questionnaire     Feeling of Stress : To some extent   Social Connections: Moderately Isolated (2023)    Social Connection and Isolation Panel [NHANES]     Frequency of Communication with Friends and Family: Once a week     Frequency of Social Gatherings with Friends and Family: Three times a week     Attends Yarsanism Services: Never     Active Member of Clubs or Organizations: No     Attends Club or Organization Meetings: Never     Marital Status:    Housing  Stability: Low Risk  (8/19/2023)    Housing Stability Vital Sign     Unable to Pay for Housing in the Last Year: No     Number of Places Lived in the Last Year: 2     Unstable Housing in the Last Year: No     Review of patient's allergies indicates:   Allergen Reactions    Bee sting [allergen ext-venom-honey bee]      Rash      Grass pollen-bermuda, standard      rash       Review of Systems   Gastrointestinal:  Positive for abdominal pain, diarrhea, nausea and vomiting.         Physical Exam:     Initial Vitals [09/09/23 1309]   BP Pulse Resp Temp SpO2   102/60 104 18 98.6 °F (37 °C) 97 %      MAP       --         Physical Exam    Nursing note and vitals reviewed.  Constitutional: She appears well-developed and well-nourished. She is not diaphoretic. No distress.   HENT:   Head: Normocephalic and atraumatic.   Mouth/Throat: Oropharynx is clear and moist.   Eyes: EOM are normal. Pupils are equal, round, and reactive to light.   Neck: No tracheal deviation present.   Cardiovascular:  Normal rate, regular rhythm, normal heart sounds and intact distal pulses.           Pulmonary/Chest: Breath sounds normal. No stridor. No respiratory distress. She has no wheezes.   Abdominal: Abdomen is soft and flat. Bowel sounds are normal. She exhibits no distension and no mass. There is generalized abdominal tenderness (mild).   Ostomy to abdomen, brown stool in bag.  Urostomy in place, dark yellow urine present.  Nephrostomy in place.  There is no rebound and no guarding.   Musculoskeletal:         General: No edema. Normal range of motion.     Neurological: She is alert and oriented to person, place, and time. She has normal strength. No cranial nerve deficit or sensory deficit.   Skin: Skin is warm and dry. Capillary refill takes less than 2 seconds. No pallor.   Psychiatric: She has a normal mood and affect. Her behavior is normal. Thought content normal.         ED Course:   Procedures  Medical Decision Making:    Differential  Diagnoses:   Based on available information and initial assessment, Differential Diagnosis includes, but is not limited to:  AAA, aortic dissection, mesenteric ischemia, perforated viscous, MI/ACS, SBO/volvulus, incarcerated/strangulated hernia, intussusception, ileus, appendicitis, cholecystitis, cholangitis, diverticulitis, esophagitis, hepatitis, nephrolithiasis, pancreatitis, gastroenteritis, colitis, IBD/IBS, biliary colic, GERD, PUD, constipation, UTI/pyelonephritis,  disorder.        EKG:   EKG interpretation by ED attending physician:  NSR, rate 98, no ST changes, no ischemia, normal intervals.  Compared with prior EKG dated 08/2023, grossly stable without significant change.      Labs:     Labs Reviewed   CBC WITHOUT DIFFERENTIAL - Abnormal; Notable for the following components:       Result Value    RBC 5.95 (*)     Hematocrit 52.6 (*)     MCH 26.7 (*)     MCHC 30.2 (*)     RDW 14.9 (*)     All other components within normal limits   COMPREHENSIVE METABOLIC PANEL - Abnormal; Notable for the following components:    Sodium 135 (*)     CO2 10 (*)     Glucose 145 (*)     BUN 66 (*)     Creatinine 3.7 (*)     Total Protein 10.0 (*)     eGFR 13.4 (*)     Anion Gap 20 (*)     All other components within normal limits   URINALYSIS, REFLEX TO URINE CULTURE - Abnormal; Notable for the following components:    Protein, UA 2+ (*)     Occult Blood UA 1+ (*)     Leukocytes, UA 3+ (*)     All other components within normal limits    Narrative:     Specimen Source->Urine   URINALYSIS MICROSCOPIC - Abnormal; Notable for the following components:    RBC, UA 8 (*)     WBC, UA >100 (*)     WBC Clumps, UA Few (*)     All other components within normal limits    Narrative:     Specimen Source->Urine   OSMOLALITY, SERUM - Abnormal; Notable for the following components:    Osmolality 316 (*)     All other components within normal limits   SODIUM, URINE, RANDOM - Abnormal; Notable for the following components:    Sodium, Urine  14 (*)     All other components within normal limits    Narrative:     Specimen Source->Urine   POCT GLUCOSE - Abnormal; Notable for the following components:    POCT Glucose 130 (*)     All other components within normal limits   LIPASE   OSMOLALITY, URINE RANDOM    Narrative:     Specimen Source->Urine   POCT GLUCOSE MONITORING CONTINUOUS     Independent review of the labs ordered include:   See ED course    Imaging:     Imaging Results              CT Abdomen Pelvis  Without Contrast (Final result)  Result time 09/09/23 20:07:17      Final result by Vinayak Baer DO (09/09/23 20:07:17)                   Impression:      1. Postoperative changes of urinary diversion with a colon conduit and left lower quadrant colostomy.  Continued moderate right-sided hydronephrosis with mild wall thickening of the right proximal ureter.  Recommend correlation with urinalysis to exclude pyelitis.  2. Left-sided percutaneous nephrostomy tube in place.  No left-sided hydronephrosis.  3. Postoperative changes of partial colectomy with a right lower quadrant ileostomy.  No bowel obstruction or parastomal hernia.      Electronically signed by: Vinayak Baer  Date:    09/09/2023  Time:    20:07               Narrative:    EXAMINATION:  CT ABDOMEN PELVIS WITHOUT CONTRAST    CLINICAL HISTORY:  Abdominal abscess/infection suspected;Nausea/vomiting;Bowel obstruction suspected;    TECHNIQUE:  Multiplanar images were obtained of the abdomen and pelvis from the hemidiaphragms through the symphysis pubis without intravenous contrast.    COMPARISON:  CT of the abdomen and pelvis from 08/18/2023.    FINDINGS:  Lung Bases: Clear.    Heart: Heart size is normal.  No pericardial effusion.    Liver: Normal in size and attenuation without focal hepatic lesion.    Biliary tract: There is mild prominence of the common bile duct, stable from prior and likely related to cholecystectomy changes.  No intrahepatic biliary ductal a shin.    Gallbladder:  Surgically absent.    Pancreas: Normal. No pancreatic ductal dilatation.    Spleen: Normal size without focal lesion.    Adrenals: Normal.    Kidneys and urinary collecting systems: There are postoperative changes of urinary diversion with a colon conduit and left lower quadrant colostomy.  There is a left-sided percutaneous nephrostomy tube with the pigtail in the renal collecting system.  There is no left-sided hydronephrosis.  There is a moderate right-sided hydronephrosis, relatively stable in comparison with prior.    Lymph nodes: None enlarged.    Stomach and bowel: The stomach is normal.  There are postop changes of partial colectomy with a right lower quadrant and ileostomy.  There is no bowel obstruction.  There is no peristomal hernia.    Peritoneum and mesentery: No ascites or free intraperitoneal air. No abdominal fluid collection.  Multiple surgical clips noted in the pelvis and lower abdomen.    Vasculature: Normal.    Urinary bladder: The urinary bladder is contracted    Reproductive organs: The uterus is absent.    Body wall: No abnormality.    Musculoskeletal: No aggressive osseous lesion.                        Final result by Vinayak Baer DO (09/09/23 20:07:17)                   Impression:      1. Postoperative changes of urinary diversion with a colon conduit and left lower quadrant colostomy.  Continued moderate right-sided hydronephrosis with mild wall thickening of the right proximal ureter.  Recommend correlation with urinalysis to exclude pyelitis.  2. Left-sided percutaneous nephrostomy tube in place.  No left-sided hydronephrosis.  3. Postoperative changes of partial colectomy with a right lower quadrant ileostomy.  No bowel obstruction or parastomal hernia.      Electronically signed by: Vinayak Baer  Date:    09/09/2023  Time:    20:07               Narrative:    EXAMINATION:  CT ABDOMEN PELVIS WITHOUT CONTRAST    CLINICAL HISTORY:  Abdominal abscess/infection  suspected;Nausea/vomiting;Bowel obstruction suspected;    TECHNIQUE:  Multiplanar images were obtained of the abdomen and pelvis from the hemidiaphragms through the symphysis pubis without intravenous contrast.    COMPARISON:  CT of the abdomen and pelvis from 08/18/2023.    FINDINGS:  Lung Bases: Clear.    Heart: Heart size is normal.  No pericardial effusion.    Liver: Normal in size and attenuation without focal hepatic lesion.    Biliary tract: There is mild prominence of the common bile duct, stable from prior and likely related to cholecystectomy changes.  No intrahepatic biliary ductal a shin.    Gallbladder: Surgically absent.    Pancreas: Normal. No pancreatic ductal dilatation.    Spleen: Normal size without focal lesion.    Adrenals: Normal.    Kidneys and urinary collecting systems: There are postoperative changes of urinary diversion with a colon conduit and left lower quadrant colostomy.  There is a left-sided percutaneous nephrostomy tube with the pigtail in the renal collecting system.  There is no left-sided hydronephrosis.  There is a moderate right-sided hydronephrosis, relatively stable in comparison with prior.    Lymph nodes: None enlarged.    Stomach and bowel: The stomach is normal.  There are postop changes of partial colectomy with a right lower quadrant and ileostomy.  There is no bowel obstruction.  There is no peristomal hernia.    Peritoneum and mesentery: No ascites or free intraperitoneal air. No abdominal fluid collection.  Multiple surgical clips noted in the pelvis and lower abdomen.    Vasculature: Normal.    Urinary bladder: The urinary bladder is contracted    Reproductive organs: The uterus is absent.    Body wall: No abnormality.    Musculoskeletal: No aggressive osseous lesion.                                    X-Rays:   Independently Interpreted Readings:   Other Readings:  CT A/P independently interpreted: no high-grade bowel obstruction or free air. Post-op findings.  Inflammation consistent with pyelonephritis.   Agree with radiologist interpretation.         Medications Given:     Medications   sodium chloride 0.9% flush 10 mL (has no administration in time range)   naloxone 0.4 mg/mL injection 0.02 mg (has no administration in time range)   glucose chewable tablet 16 g (has no administration in time range)   glucose chewable tablet 24 g (has no administration in time range)   glucagon (human recombinant) injection 1 mg (has no administration in time range)   insulin aspart U-100 pen 0-5 Units (has no administration in time range)   melatonin tablet 6 mg (has no administration in time range)   dextrose 10% bolus 125 mL 125 mL (has no administration in time range)   dextrose 10% bolus 250 mL 250 mL (has no administration in time range)   ertapenem (INVANZ) 500 mg in sodium chloride 0.9% 100 mL IVPB (0 mg Intravenous Stopped 9/9/23 2351)   lactated ringers bolus 1,000 mL ( Intravenous Canceled Entry 9/10/23 2215)   sodium chloride 0.9% bolus 500 mL 500 mL (0 mLs Intravenous Stopped 9/9/23 1400)   ondansetron injection 4 mg (4 mg Intravenous Given 9/9/23 1408)   morphine injection 4 mg (4 mg Intravenous Given 9/9/23 1408)   cefTRIAXone (ROCEPHIN) 2 g in dextrose 5 % in water (D5W) 100 mL IVPB (MB+) (0 g Intravenous Stopped 9/9/23 1940)   sodium chloride 0.9% bolus 500 mL 500 mL (0 mLs Intravenous Stopped 9/10/23 0151)   sodium chloride 0.9% bolus 500 mL 500 mL (0 mLs Intravenous Stopped 9/10/23 1354)        Additional Consideration:   Additional testing considered during clinical course: none    Social determinants of health considered during development of treatment plan include: poor access to care    Current co-morbidities considered which impacted clinical decision making: cervical cancer with multiple surgeries including colostomy, urostomy, nephrostomy, anxiety, SBO    Case discussed with additional provider:  service for admission and further management     ED Course as  of 09/10/23 2227   Sat Sep 09, 2023   1443 SpO2: 97 % [SS]   1443 Resp: 18 [SS]   1443 Pulse: 104 [SS]   1443 Temp Source: Oral [SS]   1443 Temp: 98.6 °F (37 °C) [SS]   1443 BP: 102/60  Vitals reassuring  [SS]   1444 Comprehensive metabolic panel(!)  Concerning for acute renal failure  [SS]   1825 Urinalysis Microscopic(!)  Concerning for UTI, IV Rocephin ordered.   [SS]   1825 Urinalysis, Reflex to Urine Culture Urine, Clean Catch(!)  Consistent with infection [SS]   1825 CBC Without Differential(!)  Unremarkable  [SS]      ED Course User Index  [SS] Selvin Oh MD            Medical Decision Making  57 yo F with extensive medical history presents with abdominal pain, N/V/D, weakness.  Labs concerning for ODESSA. CT A/P and UA consistent with pyelonephritis.  IVF and IV ABX given. Will admit to  for further management.     Problems Addressed:  Acute cystitis without hematuria: complicated acute illness or injury with systemic symptoms  ODESSA (acute kidney injury): complicated acute illness or injury  Dehydration: acute illness or injury  Diarrhea, unspecified type: acute illness or injury  Nausea & vomiting: complicated acute illness or injury with systemic symptoms  Pyelonephritis: complicated acute illness or injury with systemic symptoms    Amount and/or Complexity of Data Reviewed  External Data Reviewed: notes.  Labs: ordered. Decision-making details documented in ED Course.  Radiology: ordered and independent interpretation performed.  ECG/medicine tests: ordered and independent interpretation performed.    Risk  Prescription drug management.  Decision regarding hospitalization.        Clinical Impression:       ICD-10-CM ICD-9-CM   1. Pyelonephritis  N12 590.80   2. Nausea & vomiting  R11.2 787.01   3. ODESSA (acute kidney injury)  N17.9 584.9   4. Dehydration  E86.0 276.51   5. Acute cystitis without hematuria  N30.00 595.0   6. Diarrhea, unspecified type  R19.7 787.91   7. Chest pain  R07.9 786.50          Follow-up Information    None          ED Disposition Condition    Admit Stable              On re-evaluation, the patient's status has remained stable.  At this time, I believe the patient should be admitted to the hospital for further evaluation and management of ODESSA, pyelonephritis.   service was contacted and the case was discussed.   The consulting physician/team agrees with plan and will admit under their service.   The patient and family were updated with test results, overall impression, and further plan of care. All questions were answered. The patient expressed understanding and agrees with the current plan.       Selvin Oh MD  09/10/23 [id number]

## 2023-09-09 NOTE — ED NOTES
..Patient identifiers for Edita Riley 58 y.o. female checked and correct.  Chief Complaint   Patient presents with    Abdominal Pain     Diffuse abd pain x 2 weeks; +n/v/d, generalized weakness; has colostomy, urostomy, and nephrostomy tubes; admission for SBO last month     Past Medical History:   Diagnosis Date    Abnormal mammogram 08/25/2020    Acute blood loss anemia     Acute deep vein thrombosis (DVT) of lower extremity 12/09/2020    Advance care planning 04/30/2021    Anemia due to chronic blood loss     Anemia due to chronic kidney disease     Anxiety     Bilateral ureteral obstruction 9/11/2020    Cardiovascular event risk -- low 09/14/2015    ASCVD 10-year risk 1.9% (with optimal risk factors 1.3%) as of 9/14/2015     Cervical cancer 2014    Chronic back pain     Colostomy care     Deep vein thrombosis     Depression     Diarrhea due to malabsorption 11/14/2018    Difficult intubation     Disorder of kidney and ureter     DVT of lower extremity, bilateral 11/04/2020    Emphysema of lung 4/10/2023    Fibromyalgia     Fungemia 09/27/2020    Generalized abdominal pain 08/25/2020    GERD (gastroesophageal reflux disease)     Hemifacial spasm 09/16/2015    Hiatal hernia 2014    History of cervical cancer 10/11/2018    Hx of psychiatric care     Cymbalta, trazodone    Hypertension     Hypomagnesemia 11/21/2018    Lactose intolerance     Metastatic squamous cell carcinoma to lymph node 10/11/2018    Neuropathy due to chemotherapeutic drug 11/14/2018    Osteoarthritis of back     Peritonitis 09/22/2020    Pseudomonas urinary tract infection 04/21/2021    Psychiatric problem     Refusal of blood transfusions as patient is [employer]     Schatzki's ring 09/14/2015    Seen on outside EGD 05/2014, underwent esophageal dilatation. Bx were negative.     Seizures     Sleep stage dysfunction     Noted on PSG 06/2017; negative for obstructive sleep apnea     Stroke     Urinary tract infection  associated with nephrostomy catheter 06/11/2020    Wound infection after surgery 09/24/2020     Allergies reported:   Review of patient's allergies indicates:   Allergen Reactions    Bee sting [allergen ext-venom-honey bee]      Rash      Grass pollen-bermuda, standard      rash         LOC: Patient is awake, alert, and aware of environment with an appropriate affect. Patient is oriented x 3 and speaking appropriately.  APPEARANCE: Patient resting comfortably and in no acute distress. Patient is clean and well groomed, patient's clothing is properly fastened.  HEENT: **dizziness and nausea for 2 wks. Vomited once today   SKIN: The skin is warm and dry. Patient has normal skin turgor and moist mucus membranes. Skin is intact; no bruising or breakdown noted.  MUSKULOSKELETAL: Patient is moving all extremities well, no obvious deformities noted. Pulses intact.   RESPIRATORY: Airway is open and patent. Respirations are spontaneous and non-labored with normal effort and rate, BBS=clear  CARDIAC: Patient has a normal rate and rhythm. ** on cardiac monitor,No peripheral edema noted.   ABDOMEN: No distention noted. Colostomy to right side of abd.--urostomy to left side of abd. --nephrostomy to left side of abd.   NEUROLOGICAL:  Facial expression is symmetrical. Hand grasps are equal bilaterally. Normal sensation in all extremities when touched with finger.

## 2023-09-10 PROBLEM — E87.29 HIGH ANION GAP METABOLIC ACIDOSIS: Status: ACTIVE | Noted: 2021-04-17

## 2023-09-10 PROBLEM — Z86.59 HISTORY OF SUICIDAL IDEATION: Status: ACTIVE | Noted: 2023-09-10

## 2023-09-10 PROBLEM — E87.1 HYPONATREMIA: Status: ACTIVE | Noted: 2023-09-10

## 2023-09-10 LAB
ALBUMIN SERPL BCP-MCNC: 3.4 G/DL (ref 3.5–5.2)
ALP SERPL-CCNC: 118 U/L (ref 55–135)
ALT SERPL W/O P-5'-P-CCNC: 27 U/L (ref 10–44)
ANION GAP SERPL CALC-SCNC: 17 MMOL/L (ref 8–16)
AST SERPL-CCNC: 26 U/L (ref 10–40)
BASOPHILS # BLD AUTO: 0.03 K/UL (ref 0–0.2)
BASOPHILS NFR BLD: 0.3 % (ref 0–1.9)
BILIRUB SERPL-MCNC: 0.3 MG/DL (ref 0.1–1)
BUN SERPL-MCNC: 66 MG/DL (ref 6–20)
CALCIUM SERPL-MCNC: 9.6 MG/DL (ref 8.7–10.5)
CHLORIDE SERPL-SCNC: 111 MMOL/L (ref 95–110)
CO2 SERPL-SCNC: 12 MMOL/L (ref 23–29)
CREAT SERPL-MCNC: 2.8 MG/DL (ref 0.5–1.4)
CREAT UR-MCNC: 310 MG/DL (ref 15–325)
DIFFERENTIAL METHOD: ABNORMAL
EOSINOPHIL # BLD AUTO: 0 K/UL (ref 0–0.5)
EOSINOPHIL NFR BLD: 0.2 % (ref 0–8)
ERYTHROCYTE [DISTWIDTH] IN BLOOD BY AUTOMATED COUNT: 15.1 % (ref 11.5–14.5)
EST. GFR  (NO RACE VARIABLE): 18.7 ML/MIN/1.73 M^2
GLUCOSE SERPL-MCNC: 93 MG/DL (ref 70–110)
HCT VFR BLD AUTO: 46.3 % (ref 37–48.5)
HGB BLD-MCNC: 14.6 G/DL (ref 12–16)
IMM GRANULOCYTES # BLD AUTO: 0.04 K/UL (ref 0–0.04)
IMM GRANULOCYTES NFR BLD AUTO: 0.3 % (ref 0–0.5)
LYMPHOCYTES # BLD AUTO: 1.3 K/UL (ref 1–4.8)
LYMPHOCYTES NFR BLD: 10.9 % (ref 18–48)
MAGNESIUM SERPL-MCNC: 2.6 MG/DL (ref 1.6–2.6)
MCH RBC QN AUTO: 26.9 PG (ref 27–31)
MCHC RBC AUTO-ENTMCNC: 31.5 G/DL (ref 32–36)
MCV RBC AUTO: 85 FL (ref 82–98)
MONOCYTES # BLD AUTO: 1.8 K/UL (ref 0.3–1)
MONOCYTES NFR BLD: 15.2 % (ref 4–15)
NEUTROPHILS # BLD AUTO: 8.7 K/UL (ref 1.8–7.7)
NEUTROPHILS NFR BLD: 73.1 % (ref 38–73)
NRBC BLD-RTO: 0 /100 WBC
PHOSPHATE SERPL-MCNC: 5.1 MG/DL (ref 2.7–4.5)
PLATELET # BLD AUTO: 309 K/UL (ref 150–450)
PMV BLD AUTO: 11.8 FL (ref 9.2–12.9)
POCT GLUCOSE: 102 MG/DL (ref 70–110)
POCT GLUCOSE: 105 MG/DL (ref 70–110)
POCT GLUCOSE: 111 MG/DL (ref 70–110)
POCT GLUCOSE: 121 MG/DL (ref 70–110)
POTASSIUM SERPL-SCNC: 4.8 MMOL/L (ref 3.5–5.1)
PROT SERPL-MCNC: 9.3 G/DL (ref 6–8.4)
RBC # BLD AUTO: 5.42 M/UL (ref 4–5.4)
SODIUM SERPL-SCNC: 140 MMOL/L (ref 136–145)
WBC # BLD AUTO: 11.91 K/UL (ref 3.9–12.7)

## 2023-09-10 PROCEDURE — 36415 COLL VENOUS BLD VENIPUNCTURE: CPT

## 2023-09-10 PROCEDURE — 99223 PR INITIAL HOSPITAL CARE,LEVL III: ICD-10-PCS | Mod: ,,, | Performed by: NURSE PRACTITIONER

## 2023-09-10 PROCEDURE — 25000003 PHARM REV CODE 250

## 2023-09-10 PROCEDURE — 83735 ASSAY OF MAGNESIUM: CPT

## 2023-09-10 PROCEDURE — 85025 COMPLETE CBC W/AUTO DIFF WBC: CPT

## 2023-09-10 PROCEDURE — 80053 COMPREHEN METABOLIC PANEL: CPT

## 2023-09-10 PROCEDURE — 21400001 HC TELEMETRY ROOM

## 2023-09-10 PROCEDURE — 63600175 PHARM REV CODE 636 W HCPCS

## 2023-09-10 PROCEDURE — 99223 1ST HOSP IP/OBS HIGH 75: CPT | Mod: ,,, | Performed by: UROLOGY

## 2023-09-10 PROCEDURE — 63600175 PHARM REV CODE 636 W HCPCS: Performed by: HOSPITALIST

## 2023-09-10 PROCEDURE — 99223 PR INITIAL HOSPITAL CARE,LEVL III: ICD-10-PCS | Mod: ,,, | Performed by: UROLOGY

## 2023-09-10 PROCEDURE — 84100 ASSAY OF PHOSPHORUS: CPT

## 2023-09-10 PROCEDURE — 99223 1ST HOSP IP/OBS HIGH 75: CPT | Mod: ,,, | Performed by: NURSE PRACTITIONER

## 2023-09-10 PROCEDURE — 99223 1ST HOSP IP/OBS HIGH 75: CPT | Mod: ,,, | Performed by: HOSPITALIST

## 2023-09-10 PROCEDURE — 99223 PR INITIAL HOSPITAL CARE,LEVL III: ICD-10-PCS | Mod: ,,, | Performed by: HOSPITALIST

## 2023-09-10 RX ADMIN — SODIUM CHLORIDE 500 ML: 9 INJECTION, SOLUTION INTRAVENOUS at 12:09

## 2023-09-10 RX ADMIN — SODIUM CHLORIDE, SODIUM LACTATE, POTASSIUM CHLORIDE, AND CALCIUM CHLORIDE 1000 ML: .6; .31; .03; .02 INJECTION, SOLUTION INTRAVENOUS at 09:09

## 2023-09-10 RX ADMIN — HEPARIN SODIUM 5000 UNITS: 5000 INJECTION INTRAVENOUS; SUBCUTANEOUS at 05:09

## 2023-09-10 NOTE — ED NOTES
Telemetry Verification   Patient placed on Telemetry Box  Verified with War Room  Tech War Room   Box #    Rate 85   Rhythm NS

## 2023-09-10 NOTE — HPI
Edita Riley is a 58 year old woman with hx of cervical CA c/b bilateral ureteral strictures requiring bilateral nephrostomy tubes (currently left tube remains only), s/p urinary to colon conduit 2020 c/b bowel perforation s/p right hemicolectomy and ostomy, recurrent MDR infections , QTc prolongation, PTSD, major depression who presented for worsening fatigue and abdominal pain.  In ED afebrile, WBC 11.  Noted to have ODESSA (creatinine 3.7, baseline 1.1), U/A >100 WBC (unclear source).  CT A/P with right sided hydronephrosis with thickening of the proximal right ureter and concern for pyelitis.  ID consulted for pyelitis.      Blood cultures NGTD. Urine culture pending. VSS.  She is currently on IV ertapenem.

## 2023-09-10 NOTE — CONSULTS
[facility] Surg  Urology  Consult Note    Patient Name: Edita Riley  MRN: [id number]  Admission Date: 9/9/2023  Hospital Length of Stay: 1   Code Status: Full Code   Attending Provider: Stephanie Rosa MD   Consulting Provider: Maryann Darnell MD  Primary Care Physician: Delma, Primary Doctor  Principal Problem:Pyelitis    Inpatient consult to Urology  Consult performed by: Maryann Darnell MD  Consult ordered by: Zee Rivera DO  Reason for consult: L neph tube exchange           Subjective:     HPI:  Edita Riley is a 58 y.o. female with history of bilateral ureteral stricture secondary to XRT for cervical cancer, s/p open transverse colon conduit urinary diversion on 9/11/2020.  Urology consulted to determine the need for inpatient neph tube exchange.      She is presented to the ED for worsening fatigue and abdominal pain for 2 weeks.  She endorses subjective fevers and chills as well as R flank pain.  No issues with drainage of the L nephrostomy tube and it has continued to drain clear yellow urine.     She has a complicated urologic history and is s/p R PCNL on 4/21.  She passed a clamping trial and was able to have the right nephrostomy tube removed.  The left is still in place, she had an antegrade nephrostogram and a loopogram 6/6/2023 which showed reflux on the right and left distal ureteral stricture beginning at the level of the inferior portion of L4 with no contrast opacifying the ureter past this point. The nephrostomy tube was changed on 6/7/2023.      On assessment, she is AFVSS.  Cr 2.8 from 3.7 on admission (baseline around 1). WBC 11. Fena pre-renal.  UA with occasional bacteria, > 100 WBCs.  Urine culture in process. CTAP shows stable R hydro as well as L neph tube in appropriate position.  She has a hx of MDR UTI and is currently on ertapenem.         Past Medical History:   Diagnosis Date    Abnormal mammogram 08/25/2020    Acute blood loss anemia     Acute deep vein thrombosis  (DVT) of lower extremity 12/09/2020    Advance care planning 04/30/2021    Anemia due to chronic blood loss     Anemia due to chronic kidney disease     Anxiety     Bilateral ureteral obstruction 9/11/2020    Cardiovascular event risk -- low 09/14/2015    ASCVD 10-year risk 1.9% (with optimal risk factors 1.3%) as of 9/14/2015     Cervical cancer 2014    Chronic back pain     Colostomy care     Deep vein thrombosis     Depression     Diarrhea due to malabsorption 11/14/2018    Difficult intubation     Disorder of kidney and ureter     DVT of lower extremity, bilateral 11/04/2020    Emphysema of lung 4/10/2023    Fibromyalgia     Fungemia 09/27/2020    Generalized abdominal pain 08/25/2020    GERD (gastroesophageal reflux disease)     Hemifacial spasm 09/16/2015    Hiatal hernia 2014    History of cervical cancer 10/11/2018    Hx of psychiatric care     Cymbalta, trazodone    Hypertension     Hypomagnesemia 11/21/2018    Lactose intolerance     Metastatic squamous cell carcinoma to lymph node 10/11/2018    Neuropathy due to chemotherapeutic drug 11/14/2018    Osteoarthritis of back     Peritonitis 09/22/2020    Pseudomonas urinary tract infection 04/21/2021    Psychiatric problem     Refusal of blood transfusions as patient is [employer]     Schatzki's ring 09/14/2015    Seen on outside EGD 05/2014, underwent esophageal dilatation. Bx were negative.     Seizures     Sleep stage dysfunction     Noted on PSG 06/2017; negative for obstructive sleep apnea     Stroke     Urinary tract infection associated with nephrostomy catheter 06/11/2020    Wound infection after surgery 09/24/2020       Past Surgical History:   Procedure Laterality Date    ANTEGRADE NEPHROSTOGRAPHY Bilateral 9/28/2020    Procedure: Nephrostogram - antegrade;  Surgeon: Leslie Balbuena MD;  Location: [facility] OR [address] [place] [address];  Service: Urology;  Laterality: Bilateral;    ANTEGRADE NEPHROSTOGRAPHY Left 4/20/2021    Procedure: NEPHROSTOGRAM, ANTEGRADE;   Surgeon: Leslie Balbuena MD;  Location: [facility] OR 1ST FLR;  Service: Urology;  Laterality: Left;    ANTEGRADE NEPHROSTOGRAPHY Right 10/27/2022    Procedure: NEPHROSTOGRAM, ANTEGRADE;  Surgeon: Chirag Russ MD;  Location: [facility] OR 1ST FLR;  Service: Urology;  Laterality: Right;    ANTEGRADE NEPHROSTOGRAPHY Right 4/21/2023    Procedure: NEPHROSTOGRAM, ANTEGRADE;  Surgeon: Chirag Russ MD;  Location: [facility] OR 2ND FLR;  Service: Urology;  Laterality: Right;    ANTEGRADE NEPHROSTOGRAPHY Left 6/6/2023    Procedure: Nephrostogram - antegrade;  Surgeon: Leslie Balbuena MD;  Location: [facility] OR 1ST FLR;  Service: Urology;  Laterality: Left;    BILATERAL OOPHORECTOMY  2015    CHOLECYSTECTOMY  11/09/2016    Done at [facility], path showed chronic cholecystitis and gallstones    cold knife conization  2014    COLECTOMY, RIGHT  9/17/2020    Procedure: COLECTOMY, RIGHT Extended;  Surgeon: Hammad Reynolds MD;  Location: [facility] OR 2ND FLR;  Service: Colon and Rectal;;    COLONOSCOPY  2014    [facility]    COLONOSCOPY N/A 5/18/2018    Procedure: COLONOSCOPY;  Surgeon: Arden Gutiérrez MD;  Location: [facility] ENDO (4TH FLR);  Service: Endoscopy;  Laterality: N/A;    COLONOSCOPY N/A 7/28/2020    Procedure: COLONOSCOPY;  Surgeon: Hammad Reynolds MD;  Location: [facility] ENDO (4TH FLR);  Service: Colon and Rectal;  Laterality: N/A;  covid test [facility] [place] 7/25    CYSTOSCOPY WITH URETEROSCOPY, RETROGRADE PYELOGRAPHY, AND INSERTION OF STENT Bilateral 3/21/2020    Procedure: CYSTOSCOPY, WITH RETROGRADE PYELOGRAM,;  Surgeon: Leslie Balbuena MD;  Location: [facility] OR 1ST FLR;  Service: Urology;  Laterality: Bilateral;    DILATION OF NEPHROSTOMY TRACT Right 10/27/2022    Procedure: DILATION, NEPHROSTOMY TRACT;  Surgeon: Chirag Russ MD;  Location: [facility] OR 1ST FLR;  Service: Urology;  Laterality: Right;    ESOPHAGOGASTRODUODENOSCOPY  2014    ESOPHAGOGASTRODUODENOSCOPY  11/18/2020     ESOPHAGOGASTRODUODENOSCOPY N/A 11/18/2020    Procedure: ESOPHAGOGASTRODUODENOSCOPY (EGD);  Surgeon: Zenon Spencer MD;  Location: [facility] ENDO;  Service: Endoscopy;  Laterality: N/A;    ESOPHAGOGASTRODUODENOSCOPY N/A 12/11/2020    Procedure: EGD (ESOPHAGOGASTRODUODENOSCOPY);  Surgeon: Juancho Muse MD;  Location: [facility] (2ND FLR);  Service: Endoscopy;  Laterality: N/A;    HYSTERECTOMY  2014    [facility] for cervical cancer    ILEOSTOMY  9/17/2020    Procedure: CREATION, ILEOSTOMY;  Surgeon: Hammad Reynolds MD;  Location: [facility] OR 2ND FLR;  Service: Colon and Rectal;;    LOOPOGRAM N/A 4/20/2021    Procedure: LOOPOGRAM;  Surgeon: Leslie Balbuena MD;  Location: [facility] OR 1ST FLR;  Service: Urology;  Laterality: N/A;    LOOPOGRAM N/A 6/6/2023    Procedure: LOOPOGRAM;  Surgeon: Leslie Balbuena MD;  [facility]  Service: Urology;  Laterality: N/A;    MOBILIZATION OF SPLENIC FLEXURE N/A 9/11/2020    Procedure: MOBILIZATION, COLONIC;  Surgeon: Hammad Reynolds MD;  Location: [facility] OR 2ND FLR;  Service: Colon and Rectal;  Laterality: N/A;    NEPHROSTOGRAPHY Bilateral 4/17/2021    Procedure: Nephrostogram;  Surgeon: Celeste Surgeon;  Location: [facility];  Service: Anesthesiology;  Laterality: Bilateral;    OOPHORECTOMY      PERCUTANEOUS NEPHROLITHOTOMY Right 4/21/2023    Procedure: NEPHROLITHOTOMY, PERCUTANEOUS;  Surgeon: Chirag Russ MD;  Location: [facility] OR 2ND FLR;  Service: Urology;  Laterality: Right;  2.5 hrs    PERCUTANEOUS NEPHROSTOMY  4/21/2023    Procedure: CREATION, NEPHROSTOMY, PERCUTANEOUS with removal of existing nephrostomy tube;  Surgeon: Chirag Russ MD;  Location: [facility] OR 2ND FLR;  Service: Urology;;    PYELOSCOPY Right 10/27/2022    Procedure: PYELOSCOPY;  Surgeon: Chirag Russ MD;  Location: [facility] OR 1ST FLR;  Service: Urology;  Laterality: Right;    REPLACEMENT OF NEPHROSTOMY TUBE Right 10/27/2022    Procedure: REPLACEMENT, NEPHROSTOMY TUBE;  Surgeon: Chirag Russ MD;   Location: [facility] OR 1ST FLR;  Service: Urology;  Laterality: Right;    RETROPERITONEAL LYMPHADENECTOMY Right 9/17/2018    Procedure: LYMPHADENECTOMY, RETROPERITONEUM;  Surgeon: Sebas Reed MD;  Location: [facility] OR 2ND FLR;  Service: General;  Laterality: Right;  ILIAC    URETEROSCOPIC REMOVAL OF URETERIC CALCULUS Right 10/27/2022    Procedure: REMOVAL, CALCULUS, URETER, URETEROSCOPIC;  Surgeon: Chirag Russ MD;  Location: [facility] OR 1ST FLR;  Service: Urology;  Laterality: Right;    URETEROSCOPY Right 10/27/2022    Procedure: URETEROSCOPY-ANTEGRADE;  Surgeon: Chirag Russ MD;  Location: [facility] OR 1ST FLR;  Service: Urology;  Laterality: Right;       Review of patient's allergies indicates:   Allergen Reactions    Bee sting [allergen ext-venom-honey bee]      Rash      Grass pollen-bermuda, standard      rash       Family History       Problem Relation (Age of Onset)    Breast cancer Maternal Aunt    Cancer Father, Mother    Cervical cancer Mother    Colon cancer Father    Drug abuse Daughter, Daughter    Esophageal cancer Father    Heart disease Sister, Maternal Grandmother    Suicide Daughter            Tobacco Use    Smoking status: Never    Smokeless tobacco: Never   Substance and Sexual Activity    Alcohol use: No     Alcohol/week: 0.0 standard drinks of alcohol    Drug use: No    Sexual activity: Yes     Partners: Male     Birth control/protection: None     Comment:  19 years since 1999       Review of Systems   Constitutional:  Positive for chills and fever.   HENT:  Negative for trouble swallowing.    Respiratory:  Negative for cough and shortness of breath.    Cardiovascular:  Negative for chest pain.   Gastrointestinal:  Negative for abdominal distention, abdominal pain and vomiting.   Genitourinary:  Positive for flank pain. Negative for decreased urine volume and dysuria.   Musculoskeletal:  Negative for back pain.   Skin:  Negative for pallor.   Neurological:  Negative for weakness and  numbness.   Psychiatric/Behavioral:  Negative for agitation.        Objective:     Temp:  [97.7 °F (36.5 °C)-98.9 °F (37.2 °C)] 97.7 °F (36.5 °C)  Pulse:  [77-97] 96  Resp:  [15-18] 16  SpO2:  [94 %-100 %] 97 %  BP: ()/(58-73) 100/58  Weight: 70.8 kg (156 lb)  Body mass index is 23.04 kg/m².    Date 09/10/23 0700 - 09/11/23 0659   Shift 5335-6624 8666-2100 4452-2146 24 Hour Total   INTAKE   Shift Total(mL/kg)       OUTPUT   Urine(mL/kg/hr) 225   225   Stool 75   75   Shift Total(mL/kg) 300(4.2)   300(4.2)   Weight (kg) 70.8 70.8 70.8 70.8          Drains       Drain  Duration                  Urostomy 09/11/20 0000 ileal conduit LLQ 1094 days         Ileostomy 09/18/20 RLQ 1087 days         Nephrostomy 06/07/23 1349 Left 12 Fr. 95 days                     Physical Exam  Vitals reviewed.   Constitutional:       General: She is not in acute distress.     Appearance: She is not toxic-appearing or diaphoretic.   HENT:      Head: Normocephalic and atraumatic.      Nose: Nose normal.   Eyes:      Conjunctiva/sclera: Conjunctivae normal.   Cardiovascular:      Rate and Rhythm: Normal rate.   Pulmonary:      Effort: Pulmonary effort is normal. No respiratory distress.   Abdominal:      General: Abdomen is flat. There is no distension.      Palpations: Abdomen is soft.      Tenderness: There is right CVA tenderness.      Comments: Urostomy draining c/y/u.  Left nephrostomy open to drainage.   Musculoskeletal:         General: No swelling or deformity. Normal range of motion.      Cervical back: Normal range of motion.   Skin:     General: Skin is warm and dry.      Findings: No erythema.   Neurological:      General: No focal deficit present.      Mental Status: She is alert.      Motor: No weakness.   Psychiatric:         Mood and Affect: Mood normal.         Behavior: Behavior normal.         Thought Content: Thought content normal.          Significant Labs:    BMP:  Recent Labs   Lab 09/09/23  1359 09/10/23  1370    * 140   K 4.3 4.8    111*   CO2 10* 12*   BUN 66* 66*   CREATININE 3.7* 2.8*   CALCIUM 10.2 9.6       CBC:  Recent Labs   Lab 09/09/23  1359 09/10/23  0438   WBC 11.03 11.91   HGB 15.9 14.6   HCT 52.6* 46.3    309       All pertinent labs results from the past 24 hours have been reviewed.    Significant Imaging:  All pertinent imaging results/findings from the past 24 hours have been reviewed.                      Assessment and Plan:     Nephrostomy status  58F with complicated urologic history as above   - neph tube last exchanged 6/7, now due for exchange.  Would recommend non-urgent L neph tube exchange with IR.    - recommend IVFs, ODESSA likely pre-renal   - low clinical suspicion for infection at this point  - strict I/s and O/s  - hold heparin for possible neph tube exchange   - will continue to trend Cr  - f/u urine cx   - blood cx NGTD    - rest of care per primary             VTE Risk Mitigation (From admission, onward)           Ordered     IP VTE HIGH RISK PATIENT  Once         09/09/23 2045     Place sequential compression device  Until discontinued         09/09/23 2045                    Thank you for your consult. I will follow-up with patient. Please contact us if you have any additional questions.    Maryann Darnell MD  Urology  [facility] - Med Surg

## 2023-09-10 NOTE — PLAN OF CARE
Patient is AAOx4. Vital signs stable. No falls throughout shift. Blood glucose monitored. IV NS bolus administered.  Problem: Violence Risk or Actual  Goal: Anger and Impulse Control  Outcome: Ongoing, Progressing     Problem: Adult Inpatient Plan of Care  Goal: Plan of Care Review  Outcome: Ongoing, Progressing  Goal: Patient-Specific Goal (Individualized)  Outcome: Ongoing, Progressing  Goal: Absence of Hospital-Acquired Illness or Injury  Outcome: Ongoing, Progressing  Goal: Optimal Comfort and Wellbeing  Outcome: Ongoing, Progressing  Goal: Readiness for Transition of Care  Outcome: Ongoing, Progressing     Problem: Fluid and Electrolyte Imbalance (Acute Kidney Injury/Impairment)  Goal: Fluid and Electrolyte Balance  Outcome: Ongoing, Progressing     Problem: Oral Intake Inadequate (Acute Kidney Injury/Impairment)  Goal: Optimal Nutrition Intake  Outcome: Ongoing, Progressing     Problem: Renal Function Impairment (Acute Kidney Injury/Impairment)  Goal: Effective Renal Function  Outcome: Ongoing, Progressing

## 2023-09-10 NOTE — ASSESSMENT & PLAN NOTE
History of htn per chart review, does not currently take any outpatient antihypertensives  - Continue to monitor

## 2023-09-10 NOTE — SUBJECTIVE & OBJECTIVE
Past Medical History:   Diagnosis Date    Abnormal mammogram 08/25/2020    Acute blood loss anemia     Acute deep vein thrombosis (DVT) of lower extremity 12/09/2020    Advance care planning 04/30/2021    Anemia due to chronic blood loss     Anemia due to chronic kidney disease     Anxiety     Bilateral ureteral obstruction 9/11/2020    Cardiovascular event risk -- low 09/14/2015    ASCVD 10-year risk 1.9% (with optimal risk factors 1.3%) as of 9/14/2015     Cervical cancer 2014    Chronic back pain     Colostomy care     Deep vein thrombosis     Depression     Diarrhea due to malabsorption 11/14/2018    Difficult intubation     Disorder of kidney and ureter     DVT of lower extremity, bilateral 11/04/2020    Emphysema of lung 4/10/2023    Fibromyalgia     Fungemia 09/27/2020    Generalized abdominal pain 08/25/2020    GERD (gastroesophageal reflux disease)     Hemifacial spasm 09/16/2015    Hiatal hernia 2014    History of cervical cancer 10/11/2018    Hx of psychiatric care     Cymbalta, trazodone    Hypertension     Hypomagnesemia 11/21/2018    Lactose intolerance     Metastatic squamous cell carcinoma to lymph node 10/11/2018    Neuropathy due to chemotherapeutic drug 11/14/2018    Osteoarthritis of back     Peritonitis 09/22/2020    Pseudomonas urinary tract infection 04/21/2021    Psychiatric problem     Refusal of blood transfusions as patient is Zoroastrian     Schatzki's ring 09/14/2015    Seen on outside EGD 05/2014, underwent esophageal dilatation. Bx were negative.     Seizures     Sleep stage dysfunction     Noted on PSG 06/2017; negative for obstructive sleep apnea     Stroke     Urinary tract infection associated with nephrostomy catheter 06/11/2020    Wound infection after surgery 09/24/2020       Past Surgical History:   Procedure Laterality Date    ANTEGRADE NEPHROSTOGRAPHY Bilateral 9/28/2020    Procedure: Nephrostogram - antegrade;  Surgeon: Leslie Balbuena MD;  Location: [facility] OR [address] [place] [address];   Service: Urology;  Laterality: Bilateral;    ANTEGRADE NEPHROSTOGRAPHY Left 4/20/2021    Procedure: NEPHROSTOGRAM, ANTEGRADE;  Surgeon: Leslie Balbuena MD;  Location: [facility] OR 1ST FLR;  Service: Urology;  Laterality: Left;    ANTEGRADE NEPHROSTOGRAPHY Right 10/27/2022    Procedure: NEPHROSTOGRAM, ANTEGRADE;  Surgeon: Chirag Russ MD;  Location: [facility] OR 1ST FLR;  Service: Urology;  Laterality: Right;    ANTEGRADE NEPHROSTOGRAPHY Right 4/21/2023    Procedure: NEPHROSTOGRAM, ANTEGRADE;  Surgeon: Chirag Russ MD;  Location: [facility] OR 2ND FLR;  Service: Urology;  Laterality: Right;    ANTEGRADE NEPHROSTOGRAPHY Left 6/6/2023    Procedure: Nephrostogram - antegrade;  Surgeon: Leslie Balbuena MD;  Location: [facility] OR 1ST FLR;  Service: Urology;  Laterality: Left;    BILATERAL OOPHORECTOMY  2015    CHOLECYSTECTOMY  11/09/2016    Done at [facility], path showed chronic cholecystitis and gallstones    cold knife conization  2014    COLECTOMY, RIGHT  9/17/2020    Procedure: COLECTOMY, RIGHT Extended;  Surgeon: Hammad Reynolds MD;  Location: [facility] OR 2ND FLR;  Service: Colon and Rectal;;    COLONOSCOPY  2014    COLONOSCOPY N/A 10/24/2016    [facility]    COLONOSCOPY N/A 5/18/2018    Procedure: COLONOSCOPY;  Surgeon: Arden Gutiérrez MD;  Location: [facility] (4TH FLR);  Service: Endoscopy;  Laterality: N/A;    COLONOSCOPY N/A 7/28/2020    Procedure: COLONOSCOPY;  Surgeon: Hammad Reynolds MD;  Location: [facility] ENDO (4TH FLR);  Service: Colon and Rectal;  Laterality: N/A;  covid test [place] 7/25    CYSTOSCOPY WITH URETEROSCOPY, RETROGRADE PYELOGRAPHY, AND INSERTION OF STENT Bilateral 3/21/2020    Procedure: CYSTOSCOPY, WITH RETROGRADE PYELOGRAM,;  Surgeon: Leslie Balbuena MD;  Location: [facility] OR 1ST FLR;  Service: Urology;  Laterality: Bilateral;    DILATION OF NEPHROSTOMY TRACT Right 10/27/2022    Procedure: DILATION, NEPHROSTOMY TRACT;  Surgeon: Chirag Russ MD;  Location: [facility] OR 1ST FLR;  Service:  Urology;  Laterality: Right;    ESOPHAGOGASTRODUODENOSCOPY  2014    ESOPHAGOGASTRODUODENOSCOPY  11/18/2020    ESOPHAGOGASTRODUODENOSCOPY N/A 11/18/2020    Procedure: ESOPHAGOGASTRODUODENOSCOPY (EGD);  Surgeon: Zenon Spencer MD;  Location: [facility] ENDO;  Service: Endoscopy;  Laterality: N/A;    ESOPHAGOGASTRODUODENOSCOPY N/A 12/11/2020    Procedure: EGD (ESOPHAGOGASTRODUODENOSCOPY);  Surgeon: Juancho Muse MD;  Location: [facility] (2ND FLR);  Service: Endoscopy;  Laterality: N/A;    HYSTERECTOMY  2014    [facility] for cervical cancer    ILEOSTOMY  9/17/2020    Procedure: CREATION, ILEOSTOMY;  Surgeon: Hammad Reynolds MD;  Location: [facility] OR 2ND FLR;  Service: Colon and Rectal;;    LOOPOGRAM N/A 4/20/2021    Procedure: LOOPOGRAM;  Surgeon: Leslie Balbuena MD;  Location: [facility] OR 1ST FLR;  Service: Urology;  Laterality: N/A;    LOOPOGRAM N/A 6/6/2023    Procedure: LOOPOGRAM;  Surgeon: Leslie Balbuena MD;  Location: [facility] OR 1ST FLR;  Service: Urology;  Laterality: N/A;    MOBILIZATION OF SPLENIC FLEXURE N/A 9/11/2020    Procedure: MOBILIZATION, COLONIC;  Surgeon: Hammad Reynolds MD;  Location: [facility] OR 2ND FLR;  Service: Colon and Rectal;  Laterality: N/A;    NEPHROSTOGRAPHY Bilateral 4/17/2021    Procedure: Nephrostogram;  Surgeon: Celeste Surgeon;  Location: [facility];  Service: Anesthesiology;  Laterality: Bilateral;    OOPHORECTOMY      PERCUTANEOUS NEPHROLITHOTOMY Right 4/21/2023    Procedure: NEPHROLITHOTOMY, PERCUTANEOUS;  Surgeon: Chirag Russ MD;  Location: [facility] OR 2ND FLR;  Service: Urology;  Laterality: Right;  2.5 hrs    PERCUTANEOUS NEPHROSTOMY  4/21/2023    Procedure: CREATION, NEPHROSTOMY, PERCUTANEOUS with removal of existing nephrostomy tube;  Surgeon: Chirag Russ MD;  Location: [facility] OR 2ND FLR;  Service: Urology;;    PYELOSCOPY Right 10/27/2022    Procedure: PYELOSCOPY;  Surgeon: Chirag Russ MD;  Location: [facility] OR [address] [place] [address];  Service: Urology;  Laterality: Right;    REPLACEMENT OF  NEPHROSTOMY TUBE Right 10/27/2022    Procedure: REPLACEMENT, NEPHROSTOMY TUBE;  Surgeon: Chirag Russ MD;  Location: [facility] OR 1ST FLR;  Service: Urology;  Laterality: Right;    RETROPERITONEAL LYMPHADENECTOMY Right 9/17/2018    Procedure: LYMPHADENECTOMY, RETROPERITONEUM;  Surgeon: Sebas Reed MD;  Location: [facility] OR 2ND FLR;  Service: General;  Laterality: Right;  ILIAC    URETEROSCOPIC REMOVAL OF URETERIC CALCULUS Right 10/27/2022    Procedure: REMOVAL, CALCULUS, URETER, URETEROSCOPIC;  Surgeon: Chirag Russ MD;  Location: [facility] OR 1ST FLR;  Service: Urology;  Laterality: Right;    URETEROSCOPY Right 10/27/2022    Procedure: URETEROSCOPY-ANTEGRADE;  Surgeon: Chirag Russ MD;  Location: [facility] OR 1ST FLR;  Service: Urology;  Laterality: Right;       Review of patient's allergies indicates:   Allergen Reactions    Bee sting [allergen ext-venom-honey bee]      Rash      Grass pollen-bermuda, standard      rash       No current facility-administered medications on file prior to encounter.     Current Outpatient Medications on File Prior to Encounter   Medication Sig    gabapentin (NEURONTIN) 100 MG capsule Take 2 capsules (200 mg total) by mouth every evening.    mirtazapine (REMERON) 30 MG tablet Take 1 tablet (30 mg total) by mouth nightly.    oxyCODONE (ROXICODONE) 5 MG immediate release tablet Take 1 tablet (5 mg total) by mouth every 12 (twelve) hours as needed for Pain.    tolterodine (DETROL LA) 2 MG Cp24 Take 2 mg by mouth once daily.     Family History       Problem Relation (Age of Onset)    Breast cancer Maternal Aunt    Cancer Father, Mother    Cervical cancer Mother    Colon cancer Father    Drug abuse Daughter, Daughter    Esophageal cancer Father    Heart disease Sister, Maternal Grandmother    Suicide Daughter          Tobacco Use    Smoking status: Never    Smokeless tobacco: Never   Substance and Sexual Activity    Alcohol use: No     Alcohol/week: 0.0 standard drinks of alcohol     Drug use: No    Sexual activity: Yes     Partners: Male     Birth control/protection: None     Comment:  19 years since 1999     Review of Systems   Constitutional:  Positive for fatigue and fever. Negative for chills.   HENT:  Negative for sore throat and trouble swallowing.    Eyes:  Negative for visual disturbance.   Respiratory:  Negative for chest tightness, shortness of breath and wheezing.    Cardiovascular:  Negative for chest pain and leg swelling.   Gastrointestinal:  Positive for abdominal pain, nausea and vomiting. Negative for abdominal distention, blood in stool, constipation and diarrhea.   Genitourinary:  Negative for decreased urine volume, difficulty urinating, hematuria and pelvic pain.   Musculoskeletal:  Negative for back pain.   Skin:  Negative for wound.   Neurological:  Negative for dizziness, syncope, light-headedness and headaches.   Psychiatric/Behavioral:  Negative for dysphoric mood. The patient is not nervous/anxious.      Objective:     Vital Signs (Most Recent):  Temp: 98.9 °F (37.2 °C) (09/09/23 2214)  Pulse: 81 (09/09/23 2214)  Resp: 17 (09/09/23 2136)  BP: 120/73 (09/09/23 2214)  SpO2: 99 % (09/09/23 2214) Vital Signs (24h Range):  Temp:  [97.9 °F (36.6 °C)-98.9 °F (37.2 °C)] 98.9 °F (37.2 °C)  Pulse:  [] 81  Resp:  [15-20] 17  SpO2:  [94 %-99 %] 99 %  BP: ()/(60-73) 120/73     Weight: 70.8 kg (156 lb)  Body mass index is 23.04 kg/m².     Physical Exam  Vitals and nursing note reviewed.   Constitutional:       General: She is not in acute distress.     Appearance: She is ill-appearing.      Comments: Chronically ill appearing female, no acute distress but in obvious discomfort   HENT:      Head: Normocephalic and atraumatic.   Eyes:      General: No scleral icterus.  Cardiovascular:      Rate and Rhythm: Normal rate and regular rhythm.      Heart sounds: No murmur heard.  Pulmonary:      Effort: Pulmonary effort is normal. No respiratory distress.      Breath  sounds: Normal breath sounds. No wheezing or rales.   Abdominal:      General: Abdomen is flat. There is no distension.      Palpations: Abdomen is soft.      Tenderness: There is abdominal tenderness. There is guarding (voluntary).      Comments: Ileostomy bag in place with brown output  Urostomy and nephrostomy bag in place with yellow, non bloody urine  Ostomy sites do not appear irritated or inflamed  Abdomen diffusely tender to palpation  Unable to assess CVA tenderness due to body positioning and pt weakness limiting ability to participate   Musculoskeletal:      Cervical back: Normal range of motion. No rigidity.      Right lower leg: No edema.      Left lower leg: No edema.   Skin:     General: Skin is warm and dry.   Neurological:      General: No focal deficit present.      Mental Status: She is alert.      Motor: Weakness (diffuse) present.   Psychiatric:         Behavior: Behavior normal.         Thought Content: Thought content normal.                Significant Labs: All pertinent labs within the past 24 hours have been reviewed.    Significant Imaging: I have reviewed all pertinent imaging results/findings within the past 24 hours.

## 2023-09-10 NOTE — ASSESSMENT & PLAN NOTE
Creatinine 3.7 on admit, baseline around 1.1  GFR on admit 13.5, baseline around 50    Plan:   Lab Results   Component Value Date    CREATININE 3.7 (H) 09/09/2023     - See ODESSA  - Avoid nephrotoxic agents such as NSAIDs, gadolinium and IV radiocontrast.  - Renally dose meds to current GFR.  - Maintain MAP > 65.

## 2023-09-10 NOTE — ASSESSMENT & PLAN NOTE
History of cervical cancer w mets to LN, s/p chemo and XRT.  Previously followed with Dr. Lemon in gyn/onc, last visit Dec 2020. Was supposed to RTC in 3 months, however appears to have been lost to follow up.   Recommend referral for continued outpatient maintenance at time of discharge.

## 2023-09-10 NOTE — ASSESSMENT & PLAN NOTE
She is established and follows with outpatient urology. Her nephrostomy tube was last exchanged in June, and there are plans for exchange and subsequent revision this month. She was treated with an empiric course of Cefdinir 7/26, which she states she recently completed.  - See pyelitis  - F/u urology consult

## 2023-09-10 NOTE — ASSESSMENT & PLAN NOTE
Patient with acute kidney injury/acute renal failure. Differential is broad, consider post-renal due to known history of ureteral obstruction. Consider pre-renal/intrarenal in setting of active infection, though she is currently hemodynamically stable and withought leukocytosis, decreasing concern for sepsis/hypoperfusion. Not currently taking any nephrotoxic medications.   ODESSA is currently stable. Baseline creatinine 1.1 - Labs reviewed- Renal function/electrolytes with Estimated Creatinine Clearance: 16.9 mL/min (A) (based on SCr of 3.7 mg/dL (H)). according to latest data. Monitor urine output and serial BMP and adjust therapy as needed. Avoid nephrotoxins and renally dose meds for GFR listed above.    CT AP w continued moderate R sided hydronephrosis w mild wall thickening of R prox ureter; L sided percutaneous nephrostomy tube in place; no L sided hydro    Plan:  - Will defer RP US at this time given visualization of kidneys on CT  - F/u urine lytes  - S/p 500cc bolus in ED, will administer another 500cc  - F/u urology consult

## 2023-09-10 NOTE — HPI
Edita Riley is a 58 y.o. female with history of bilateral ureteral stricture secondary to XRT for cervical cancer, s/p open transverse colon conduit urinary diversion on 9/11/2020.  Urology consulted to determine the need for inpatient neph tube exchange.      She is presented to the ED for worsening fatigue and abdominal pain for 2 weeks.  She endorses subjective fevers and chills as well as R flank pain.  No issues with drainage of the L nephrostomy tube and it has continued to drain clear yellow urine.     She has a complicated urologic history and is s/p R PCNL on 4/21.  She passed a clamping trial and was able to have the right nephrostomy tube removed.  The left is still in place, she had an antegrade nephrostogram and a loopogram 6/6/2023 which showed reflux on the right and left distal ureteral stricture beginning at the level of the inferior portion of L4 with no contrast opacifying the ureter past this point. The nephrostomy tube was changed on 6/7/2023.      Cr 2.8 from 3.7 on admission (baseline around 1). WBC 11. Fena pre-renal.  UA with occasional bacteria, > 100 WBCs.  Urine culture in process. CTAP shows stable R hydro as well as L neph tube in appropriate position.  She is on ertapenem.

## 2023-09-10 NOTE — ASSESSMENT & PLAN NOTE
Pt required PEC/CEC 6/4-6/10 of this year due to SI in setting of MDD and complex bereavement (daughter committed suicide May 2023). This was rescinded when she no longer posed as a threat to herself.   Remeron is listed as an outpatient medication however she states she is not taking it. Denies SI at this time.     - Low threshold to engage psychiatry and/or resume Remeron if indicated

## 2023-09-10 NOTE — PROGRESS NOTES
Pharmacist Renal Dose Adjustment Note    Edita Riley is a 58 y.o. female being treated with the medication ertapenem.    Patient Data:    Vital Signs (Most Recent):  Temp: 98.9 °F (37.2 °C) (09/09/23 2214)  Pulse: 81 (09/09/23 2214)  Resp: 17 (09/09/23 2136)  BP: 120/73 (09/09/23 2214)  SpO2: 99 % (09/09/23 2214) Vital Signs (72h Range):  Temp:  [97.9 °F (36.6 °C)-98.9 °F (37.2 °C)]   Pulse:  []   Resp:  [15-20]   BP: ()/(60-73)   SpO2:  [94 %-99 %]      Recent Labs   Lab 09/09/23  1359   CREATININE 3.7*     Serum creatinine: 3.7 mg/dL (H) 09/09/23 1359  Estimated creatinine clearance: 16.9 mL/min (A)    Medication:ertapenem dose: 1000 mg frequency every 24 hours will be changed to medication:ertapenem dose:500 mg frequency:every 24 hours.    Pharmacist's Name: Benson Rhodes  Pharmacist's Extension: [phone]

## 2023-09-10 NOTE — H&P
"[facility] Medicine  History & Physical    Patient Name: Edita Riley  MRN: [id number]  Patient Class: IP- Inpatient  Admission Date: 9/9/2023  Attending Physician: Stephanie Rosa MD   Primary Care Provider: Delma Primary Doctor         Patient information was obtained from patient, past medical records and ER records.     Subjective:     Principal Problem:Pyelitis    Chief Complaint:   Chief Complaint   Patient presents with    Abdominal Pain     Diffuse abd pain x 2 weeks; +n/v/d, generalized weakness; has colostomy, urostomy, and nephrostomy tubes; admission for SBO last month        HPI: This is a 58 year old female with complicated medical history including cervical cancer in 2014 w recurrence in 2020 s/p chemotherapy and XRT, c/b bilateral ureteral strictures s/p bilateral nephrostomy tubes (R nephrostomy tube now removed, L tube remains in place) and transverse colon-urinary conduit in 2020 (this was also c/b bowel perforation requiring right hemicolectomy and ostomy creation), all of which has been complicated by recurrent MDR infections, as well as CVA at the age of 7, emphysema, htn, DVT of LE in 2020 no longer on treatment, and psychiatric comorbidities requiring prior PEC. She is presenting for worsening fatigue and abdominal pain. When asked how long she has been feeling poorly, she states "62 weeks", however when further prompted, she notes it has been exacerbated over the last 2 weeks. Her  has been ill with some sort of flu-like illness (pt unable to specify further), which she originally thought she caught from him. Today she vomited once, which prompted presentation to the ED. She describes the abdominal pain as "sharp" and located in the center of her abdomen. No aggravating or alleviating factors. Associated symptoms include subjective fever, though when she checked her temperature at home it was 98-99. She denies shortness of breath, cough, or changes in " consistency or frequency of ileostomy/nephrostomy output. She has not noticed any new or worsening wounds.     She is established and follows with outpatient urology. Her nephrostomy tube was last exchanged in June, and there are plans for exchange and subsequent revision this month. She was treated with an empiric course of Cefdinir 7/26, which she states she recently completed. Regarding her ID history, she has had multiple MDR UTIs including Enterobacter cloacae 5/30, Klebsiella penumoniae ESBL 5/11, E faecium VRE 4/18, and Klebsiella pneumoniae ESBL 2/14. She has received ertapenem and meropenem in the past. Blood cultures in 2020 also with yeast requiring micafungin.     In the ED, she was afebrile and HDS on RA. WBC 11.03. Labs also notable for significant ODESSA w BUN 66 and Cr 3.7 (recent baseline ~1.1). UA w 2+ protein, 1+ occult blood, negative nitrites, 3+ leuks, > 100 WBCs, occasional bacteria. CT AP notable for known R sided hydronephrosis w mild wall thickening of proximal R ureter, and possible pyelitis. She was treated with a 1/2L CC fluid bolus and a dose of CTX, and admitted to hospital medicine for further evaluation.       Past Medical History:   Diagnosis Date    Abnormal mammogram 08/25/2020    Acute blood loss anemia     Acute deep vein thrombosis (DVT) of lower extremity 12/09/2020    Advance care planning 04/30/2021    Anemia due to chronic blood loss     Anemia due to chronic kidney disease     Anxiety     Bilateral ureteral obstruction 9/11/2020    Cardiovascular event risk -- low 09/14/2015    ASCVD 10-year risk 1.9% (with optimal risk factors 1.3%) as of 9/14/2015     Cervical cancer 2014    Chronic back pain     Colostomy care     Deep vein thrombosis     Depression     Diarrhea due to malabsorption 11/14/2018    Difficult intubation     Disorder of kidney and ureter     DVT of lower extremity, bilateral 11/04/2020    Emphysema of lung 4/10/2023    Fibromyalgia      Fungemia 09/27/2020    Generalized abdominal pain 08/25/2020    GERD (gastroesophageal reflux disease)     Hemifacial spasm 09/16/2015    Hiatal hernia 2014    History of cervical cancer 10/11/2018    Hx of psychiatric care     Cymbalta, trazodone    Hypertension     Hypomagnesemia 11/21/2018    Lactose intolerance     Metastatic squamous cell carcinoma to lymph node 10/11/2018    Neuropathy due to chemotherapeutic drug 11/14/2018    Osteoarthritis of back     Peritonitis 09/22/2020    Pseudomonas urinary tract infection 04/21/2021    Psychiatric problem     Refusal of blood transfusions as patient is Latter day     Schatzki's ring 09/14/2015    Seen on outside EGD 05/2014, underwent esophageal dilatation. Bx were negative.     Seizures     Sleep stage dysfunction     Noted on PSG 06/2017; negative for obstructive sleep apnea     Stroke     Urinary tract infection associated with nephrostomy catheter 06/11/2020    Wound infection after surgery 09/24/2020       Past Surgical History:   Procedure Laterality Date    ANTEGRADE NEPHROSTOGRAPHY Bilateral 9/28/2020    Procedure: Nephrostogram - antegrade;  Surgeon: Leslie Balbuena MD;  Location: [facility] OR [address] [place] [address];  Service: Urology;  Laterality: Bilateral;    ANTEGRADE NEPHROSTOGRAPHY Left 4/20/2021    Procedure: NEPHROSTOGRAM, ANTEGRADE;  Surgeon: Leslie Balbuena MD;  Location: [facility] OR 1ST FLR;  Service: Urology;  Laterality: Left;    ANTEGRADE NEPHROSTOGRAPHY Right 10/27/2022    Procedure: NEPHROSTOGRAM, ANTEGRADE;  Surgeon: Chirag Russ MD;  Location: [facility] OR 1ST FLR;  Service: Urology;  Laterality: Right;    ANTEGRADE NEPHROSTOGRAPHY Right 4/21/2023    Procedure: NEPHROSTOGRAM, ANTEGRADE;  Surgeon: Chirag Russ MD;  Location: [facility] OR 2ND FLR;  Service: Urology;  Laterality: Right;    ANTEGRADE NEPHROSTOGRAPHY Left 6/6/2023    Procedure: Nephrostogram - antegrade;  Surgeon: Leslie Balbuena MD;  Location: [facility] OR [address] [place] [address];   Service: Urology;  Laterality: Left;    BILATERAL OOPHORECTOMY  2015    CHOLECYSTECTOMY  11/09/2016    Done at [facility], path showed chronic cholecystitis and gallstones    cold knife conization  2014    COLECTOMY, RIGHT  9/17/2020    Procedure: COLECTOMY, RIGHT Extended;  Surgeon: Hammad Reynolds MD;  Location: [facility] OR 2ND FLR;  Service: Colon and Rectal;;    COLONOSCOPY  2014    [facility]    COLONOSCOPY N/A 5/18/2018    Procedure: COLONOSCOPY;  Surgeon: Arden Gutiérrez MD;  Location: [facility] (4TH FLR);  Service: Endoscopy;  Laterality: N/A;    COLONOSCOPY N/A 7/28/2020    Procedure: COLONOSCOPY;  Surgeon: Hammad Reynolds MD;  Location: [facility] (4TH FLR);  Service: Colon and Rectal;  Laterality: N/A;  covid test [place] 7/25    CYSTOSCOPY WITH URETEROSCOPY, RETROGRADE PYELOGRAPHY, AND INSERTION OF STENT Bilateral 3/21/2020    Procedure: CYSTOSCOPY, WITH RETROGRADE PYELOGRAM,;  Surgeon: Leslie Balbuena MD;  Location: [facility] OR 1ST FLR;  Service: Urology;  Laterality: Bilateral;    DILATION OF NEPHROSTOMY TRACT Right 10/27/2022    Procedure: DILATION, NEPHROSTOMY TRACT;  Surgeon: Chirag Russ MD;  Location: [facility] OR 1ST FLR;  Service: Urology;  Laterality: Right;    ESOPHAGOGASTRODUODENOSCOPY  2014    ESOPHAGOGASTRODUODENOSCOPY  11/18/2020    ESOPHAGOGASTRODUODENOSCOPY N/A 11/18/2020    Procedure: ESOPHAGOGASTRODUODENOSCOPY (EGD);  Surgeon: Zenon Spencer MD;  Location: [facility];  Service: Endoscopy;  Laterality: N/A;    ESOPHAGOGASTRODUODENOSCOPY N/A 12/11/2020    Procedure: EGD (ESOPHAGOGASTRODUODENOSCOPY);  Surgeon: Juancho Muse MD;  Location: [facility] (2ND FLR);  Service: Endoscopy;  Laterality: N/A;    HYSTERECTOMY  2014    [facility]    ILEOSTOMY  9/17/2020    Procedure: CREATION, ILEOSTOMY;  Surgeon: Hammad Reynolds MD;  Location: [facility] OR 2ND FLR;  Service: Colon and Rectal;;    LOOPOGRAM N/A 4/20/2021    Procedure:  LOOPOGRAM;  Surgeon: Leslie Balbuena MD;  Location: [facility] OR 1ST FLR;  Service: Urology;  Laterality: N/A;    LOOPOGRAM N/A 6/6/2023    Procedure: LOOPOGRAM;  Surgeon: Leslie Balbuena MD;  Location: [facility] OR 1ST FLR;  Service: Urology;  Laterality: N/A;    MOBILIZATION OF SPLENIC FLEXURE N/A 9/11/2020    Procedure: MOBILIZATION, COLONIC;  Surgeon: Hammad Reynolds MD;  Location: [facility] OR 2ND FLR;  Service: Colon and Rectal;  Laterality: N/A;    NEPHROSTOGRAPHY Bilateral 4/17/2021    Procedure: Nephrostogram;  Surgeon: Celeste Surgeon;  Location: [facility] CELESTE;  Service: Anesthesiology;  Laterality: Bilateral;    OOPHORECTOMY      PERCUTANEOUS NEPHROLITHOTOMY Right 4/21/2023    Procedure: NEPHROLITHOTOMY, PERCUTANEOUS;  Surgeon: Chirag Russ MD;  Location: [facility] OR 2ND FLR;  Service: Urology;  Laterality: Right;  2.5 hrs    PERCUTANEOUS NEPHROSTOMY  4/21/2023    Procedure: CREATION, NEPHROSTOMY, PERCUTANEOUS with removal of existing nephrostomy tube;  Surgeon: Chirag Russ MD;  Location: [facility] OR 2ND FLR;  Service: Urology;;    PYELOSCOPY Right 10/27/2022    Procedure: PYELOSCOPY;  Surgeon: Chirag Russ MD;  [facility];  Service: Urology;  Laterality: Right;    REPLACEMENT OF NEPHROSTOMY TUBE Right 10/27/2022    Procedure: REPLACEMENT, NEPHROSTOMY TUBE;  Surgeon: Chirag Russ MD;  [facility];  Service: Urology;  Laterality: Right;    RETROPERITONEAL LYMPHADENECTOMY Right 9/17/2018    Procedure: LYMPHADENECTOMY, RETROPERITONEUM;  Surgeon: Sebas Reed MD;  Location: [facility] OR 2ND FLR;  Service: General;  Laterality: Right;  ILIAC    URETEROSCOPIC REMOVAL OF URETERIC CALCULUS Right 10/27/2022    Procedure: REMOVAL, CALCULUS, URETER, URETEROSCOPIC;  Surgeon: Chirag Russ MD;  Location: [facility] OR 1ST FLR;  Service: Urology;  Laterality: Right;    URETEROSCOPY Right 10/27/2022    Procedure: URETEROSCOPY-ANTEGRADE;  Surgeon: Chirag Russ MD;  Location:  [facility] OR 1ST FLR;  Service: Urology;  Laterality: Right;       Review of patient's allergies indicates:   Allergen Reactions    Bee sting [allergen ext-venom-honey bee]      Rash      Grass pollen-bermuda, standard      rash       No current facility-administered medications on file prior to encounter.     Current Outpatient Medications on File Prior to Encounter   Medication Sig    gabapentin (NEURONTIN) 100 MG capsule Take 2 capsules (200 mg total) by mouth every evening.    mirtazapine (REMERON) 30 MG tablet Take 1 tablet (30 mg total) by mouth nightly.    oxyCODONE (ROXICODONE) 5 MG immediate release tablet Take 1 tablet (5 mg total) by mouth every 12 (twelve) hours as needed for Pain.    tolterodine (DETROL LA) 2 MG Cp24 Take 2 mg by mouth once daily.     Family History       Problem Relation (Age of Onset)    Breast cancer Maternal Aunt    Cancer Father, Mother    Cervical cancer Mother    Colon cancer Father    Drug abuse Daughter, Daughter    Esophageal cancer Father    Heart disease Sister, Maternal Grandmother    Suicide Daughter          Tobacco Use    Smoking status: Never    Smokeless tobacco: Never   Substance and Sexual Activity    Alcohol use: No     Alcohol/week: 0.0 standard drinks of alcohol    Drug use: No    Sexual activity: Yes     Partners: Male     Birth control/protection: None     Comment:  19 years since 1999     Review of Systems   Constitutional:  Positive for fatigue and fever. Negative for chills.   HENT:  Negative for sore throat and trouble swallowing.    Eyes:  Negative for visual disturbance.   Respiratory:  Negative for chest tightness, shortness of breath and wheezing.    Cardiovascular:  Negative for chest pain and leg swelling.   Gastrointestinal:  Positive for abdominal pain, nausea and vomiting. Negative for abdominal distention, blood in stool, constipation and diarrhea.   Genitourinary:  Negative for decreased urine volume, difficulty urinating, hematuria  and pelvic pain.   Musculoskeletal:  Negative for back pain.   Skin:  Negative for wound.   Neurological:  Negative for dizziness, syncope, light-headedness and headaches.   Psychiatric/Behavioral:  Negative for dysphoric mood. The patient is not nervous/anxious.      Objective:     Vital Signs (Most Recent):  Temp: 98.9 °F (37.2 °C) (09/09/23 2214)  Pulse: 81 (09/09/23 2214)  Resp: 17 (09/09/23 2136)  BP: 120/73 (09/09/23 2214)  SpO2: 99 % (09/09/23 2214) Vital Signs (24h Range):  Temp:  [97.9 °F (36.6 °C)-98.9 °F (37.2 °C)] 98.9 °F (37.2 °C)  Pulse:  [] 81  Resp:  [15-20] 17  SpO2:  [94 %-99 %] 99 %  BP: ()/(60-73) 120/73     Weight: 70.8 kg (156 lb)  Body mass index is 23.04 kg/m².     Physical Exam  Vitals and nursing note reviewed.   Constitutional:       General: She is not in acute distress.     Appearance: She is ill-appearing.      Comments: Chronically ill appearing female, no acute distress but in obvious discomfort   HENT:      Head: Normocephalic and atraumatic.   Eyes:      General: No scleral icterus.  Cardiovascular:      Rate and Rhythm: Normal rate and regular rhythm.      Heart sounds: No murmur heard.  Pulmonary:      Effort: Pulmonary effort is normal. No respiratory distress.      Breath sounds: Normal breath sounds. No wheezing or rales.   Abdominal:      General: Abdomen is flat. There is no distension.      Palpations: Abdomen is soft.      Tenderness: There is abdominal tenderness. There is guarding (voluntary).      Comments: Ileostomy bag in place with brown output  Urostomy and nephrostomy bag in place with yellow, non bloody urine  Ostomy sites do not appear irritated or inflamed  Abdomen diffusely tender to palpation  Unable to assess CVA tenderness due to body positioning and pt weakness limiting ability to participate   Musculoskeletal:      Cervical back: Normal range of motion. No rigidity.      Right lower leg: No edema.      Left lower leg: No edema.   Skin:      General: Skin is warm and dry.   Neurological:      General: No focal deficit present.      Mental Status: She is alert.      Motor: Weakness (diffuse) present.   Psychiatric:         Behavior: Behavior normal.         Thought Content: Thought content normal.                Significant Labs: All pertinent labs within the past 24 hours have been reviewed.    Significant Imaging: I have reviewed all pertinent imaging results/findings within the past 24 hours.    Assessment/Plan:     * Pyelitis  This is a 58 year old female with complicated medical history including cervical cancer in 2014 s/p chemotherapy and XRT who then developed ureteral stricture 2/2 XRT s/p bilateral nephrostomy tubes (R nephrostomy tube now removed, L tube remains in place) and transverse colon-urinary conduit in 2020, which was c/b bowel perforation requiring right hemicolectomy and ostomy creation, all of which has been complicated by recurrent MDR infections presenting for worsening fatigue and abdominal pain x 2 weeks. No changes in consistency or frequency of ileostomy/urostomy/nephrostomy output.    - She has had significant ID history, including multiple MDR UTIs ( Enterobacter cloacae 5/30, Klebsiella penumoniae ESBL 5/11, E faecium VRE 4/18, and Klebsiella pneumoniae ESBL 2/14). She has received ertapenem and meropenem in the past. Blood cultures in 2020 also with yeast requiring micafungin.   - On admission, she is HDS and without leukocytosis (WBC 11)  - UA w 3+ leuks but negative nitrites and only occasional bacteria. Not overwhelmingly concerning for infection, however given her history and imaging findings, will continue with treatment    Plan:  - Initiate ertapenem while awaiting further results  - F/u urine and blood cultures  - F/u ID and urology consults, appreciate assistance  - Given overall hemodynamic stability, concern for sepsis is low. Will defer lactate at this time, however can consider addition to AM labs if  indicated    ODESSA (acute kidney injury)  Patient with acute kidney injury/acute renal failure. Differential is broad, consider post-renal due to known history of ureteral obstruction. Consider pre-renal/intrarenal in setting of active infection, though she is currently hemodynamically stable and withought leukocytosis, decreasing concern for sepsis/hypoperfusion. Not currently taking any nephrotoxic medications.   ODESSA is currently stable. Baseline creatinine 1.1 - Labs reviewed- Renal function/electrolytes with Estimated Creatinine Clearance: 16.9 mL/min (A) (based on SCr of 3.7 mg/dL (H)). according to latest data. Monitor urine output and serial BMP and adjust therapy as needed. Avoid nephrotoxins and renally dose meds for GFR listed above.    CT AP w continued moderate R sided hydronephrosis w mild wall thickening of R prox ureter; L sided percutaneous nephrostomy tube in place; no L sided hydro    Plan:  - Will defer RP US at this time given visualization of kidneys on CT  - F/u urine lytes  - S/p 500cc bolus in ED, will administer another 500cc  - F/u urology consult      Nephrostomy status  She is established and follows with outpatient urology. Her nephrostomy tube was last exchanged in June, and there are plans for exchange and subsequent revision this month. She was treated with an empiric course of Cefdinir 7/26, which she states she recently completed.  - See pyelitis  - F/u urology consult      Presence of urostomy  See pyelitis      Ileostomy in place  See pyelitis      History of cervical cancer  History of cervical cancer w mets to LN, s/p chemo and XRT.  Previously followed with Dr. Lemon in gyn/onc, last visit Dec 2020. Was supposed to RTC in 3 months, however appears to have been lost to follow up.   Recommend referral for continued outpatient maintenance at time of discharge.       History of suicidal ideation  Pt required PEC/CEC 6/4-6/10 of this year due to SI in setting of MDD and complex  "bereavement (daughter committed suicide May 2023). This was rescinded when she no longer posed as a threat to herself.   Remeron is listed as an outpatient medication however she states she is not taking it. Denies SI at this time.     - Low threshold to engage psychiatry and/or resume Remeron if indicated      Refusal of blood transfusions as patient is Muslim  Noted. No concerns for blood loss at this time, do not anticipate need for transfusion       Emphysema of lung  Mild diffuse emphysematous changes noted on Chest CT 2020. Has never smoked tobacco. CXR on admission stable.  - Consider addition of albuterol nebs/breathing treatments if indicated          CKD (chronic kidney disease), stage III  Creatinine 3.7 on admit, baseline around 1.1  GFR on admit 13.5, baseline around 50    Plan:   Lab Results   Component Value Date    CREATININE 3.7 (H) 09/09/2023     - See ODESSA  - Avoid nephrotoxic agents such as NSAIDs, gadolinium and IV radiocontrast.  - Renally dose meds to current GFR.  - Maintain MAP > 65.        QT prolongation  QTc on admission 451 ms.   Documented QTc of 605 ms in April 2021.   Cautious utilization of QT prolonging medications.       History of DVT (deep vein thrombosis)  History of DVT in 2020, likely provoked due to prolonged/recurrent hospitalizations. Previously treated with Eliquis, no longer taking.     - DVT ppx w heparin      Anemia due to chronic kidney disease  Baseline Hgb ~10-11. WNL at 15 on admission  - Daily CBC       Seizure-like activity  Per PCP note in April: "Had not seizure in a long time - - Sounds like Medication related   No vascular stenting"  - Noted, carbapenems lower seizure threshold, however at this time benefits of treatment appear to outweigh risks. Has tolerated ertapenem in the past.       History of essential hypertension  History of htn per chart review, does not currently take any outpatient antihypertensives  - Continue to monitor        VTE Risk " Mitigation (From admission, onward)         Ordered     heparin (porcine) injection 5,000 Units  Every 8 hours         09/09/23 2243     IP VTE HIGH RISK PATIENT  Once         09/09/23 2045     Place sequential compression device  Until discontinued         09/09/23 2045                  Zee Rivera DO  Department of Hospital Medicine  [facility]

## 2023-09-10 NOTE — HPI
"This is a 58 year old female with complicated medical history including cervical cancer in 2014 w recurrence in 2020 s/p chemotherapy and XRT, c/b bilateral ureteral strictures s/p bilateral nephrostomy tubes (R nephrostomy tube now removed, L tube remains in place) and transverse colon-urinary conduit in 2020 (this was also c/b bowel perforation requiring right hemicolectomy and ostomy creation), all of which has been complicated by recurrent MDR infections, as well as CVA at the age of 7, emphysema, htn, DVT of LE in 2020 no longer on treatment, and psychiatric comorbidities requiring prior PEC. She is presenting for worsening fatigue and abdominal pain. When asked how long she has been feeling poorly, she states "62 weeks", however when further prompted, she notes it has been exacerbated over the last 2 weeks. Her  has been ill with some sort of flu-like illness (pt unable to specify further), which she originally thought she caught from him. Today she vomited once, which prompted presentation to the ED. She describes the abdominal pain as "sharp" and located in the center of her abdomen. No aggravating or alleviating factors. Associated symptoms include subjective fever, though when she checked her temperature at home it was 98-99. She denies shortness of breath, cough, or changes in consistency or frequency of ileostomy/nephrostomy output. She has not noticed any new or worsening wounds.     She is established and follows with outpatient urology. Her nephrostomy tube was last exchanged in June, and there are plans for exchange and subsequent revision this month. She was treated with an empiric course of Cefdinir 7/26, which she states she recently completed. Regarding her ID history, she has had multiple MDR UTIs including Enterobacter cloacae 5/30, Klebsiella penumoniae ESBL 5/11, E faecium VRE 4/18, and Klebsiella pneumoniae ESBL 2/14. She has received ertapenem and meropenem in the past. Blood " cultures in 2020 also with yeast requiring micafungin.     In the ED, she was afebrile and HDS on RA. WBC 11.03. Labs also notable for significant ODESSA w BUN 66 and Cr 3.7 (recent baseline ~1.1). UA w 2+ protein, 1+ occult blood, negative nitrites, 3+ leuks, > 100 WBCs, occasional bacteria. CT AP notable for known R sided hydronephrosis w mild wall thickening of proximal R ureter, and possible pyelitis. She was treated with a 1/2L CC fluid bolus and a dose of CTX, and admitted to hospital medicine for further evaluation.

## 2023-09-10 NOTE — ASSESSMENT & PLAN NOTE
History of DVT in 2020, likely provoked due to prolonged/recurrent hospitalizations. Previously treated with Eliquis, no longer taking.     - DVT ppx w heparin

## 2023-09-10 NOTE — NURSING
Nurses Note -- 4 Eyes      9/9/2023   11:34 PM      Skin assessed during: Admit      [x] No Altered Skin Integrity Present    []Prevention Measures Documented      [] Yes- Altered Skin Integrity Present or Discovered   [] LDA Added if Not in Epic (Describe Wound)   [] New Altered Skin Integrity was Present on Admit and Documented in LDA   [] Wound Image Taken    Wound Care Consulted? No    Attending Nurse:  AMY Israel     Second RN/Staff Member:  AMY Zuluaga

## 2023-09-10 NOTE — SUBJECTIVE & OBJECTIVE
Past Medical History:   Diagnosis Date    Abnormal mammogram 08/25/2020    Acute blood loss anemia     Acute deep vein thrombosis (DVT) of lower extremity 12/09/2020    Advance care planning 04/30/2021    Anemia due to chronic blood loss     Anemia due to chronic kidney disease     Anxiety     Bilateral ureteral obstruction 9/11/2020    Cardiovascular event risk -- low 09/14/2015    ASCVD 10-year risk 1.9% (with optimal risk factors 1.3%) as of 9/14/2015     Cervical cancer 2014    Chronic back pain     Colostomy care     Deep vein thrombosis     Depression     Diarrhea due to malabsorption 11/14/2018    Difficult intubation     Disorder of kidney and ureter     DVT of lower extremity, bilateral 11/04/2020    Emphysema of lung 4/10/2023    Fibromyalgia     Fungemia 09/27/2020    Generalized abdominal pain 08/25/2020    GERD (gastroesophageal reflux disease)     Hemifacial spasm 09/16/2015    Hiatal hernia 2014    History of cervical cancer 10/11/2018    Hx of psychiatric care     Cymbalta, trazodone    Hypertension     Hypomagnesemia 11/21/2018    Lactose intolerance     Metastatic squamous cell carcinoma to lymph node 10/11/2018    Neuropathy due to chemotherapeutic drug 11/14/2018    Osteoarthritis of back     Peritonitis 09/22/2020    Pseudomonas urinary tract infection 04/21/2021    Psychiatric problem     Refusal of blood transfusions as patient is [place]     Schatzki's ring 09/14/2015    Seen on outside EGD 05/2014, underwent esophageal dilatation. Bx were negative.     Seizures     Sleep stage dysfunction     Noted on PSG 06/2017; negative for obstructive sleep apnea     Stroke     Urinary tract infection associated with nephrostomy catheter 06/11/2020    Wound infection after surgery 09/24/2020       Past Surgical History:   Procedure Laterality Date    ANTEGRADE NEPHROSTOGRAPHY Bilateral 9/28/2020    Procedure: Nephrostogram - antegrade;  Surgeon: Leslie Balbuena MD;  Location: [facility] OR [address] [place] [address];   Service: Urology;  Laterality: Bilateral;    ANTEGRADE NEPHROSTOGRAPHY Left 4/20/2021    Procedure: NEPHROSTOGRAM, ANTEGRADE;  Surgeon: Leslie Balbuena MD;  Location: [facility] OR 1ST FLR;  Service: Urology;  Laterality: Left;    ANTEGRADE NEPHROSTOGRAPHY Right 10/27/2022    Procedure: NEPHROSTOGRAM, ANTEGRADE;  Surgeon: Chirag Russ MD;  Location: [facility] OR 1ST FLR;  Service: Urology;  Laterality: Right;    ANTEGRADE NEPHROSTOGRAPHY Right 4/21/2023    Procedure: NEPHROSTOGRAM, ANTEGRADE;  Surgeon: Chirag Russ MD;  Location: [facility] OR 2ND FLR;  Service: Urology;  Laterality: Right;    ANTEGRADE NEPHROSTOGRAPHY Left 6/6/2023    Procedure: Nephrostogram - antegrade;  Surgeon: Leslie Balbuena MD;  Location: [facility] OR 1ST FLR;  Service: Urology;  Laterality: Left;    BILATERAL OOPHORECTOMY  2015    CHOLECYSTECTOMY  11/09/2016    Done at [facility], path showed chronic cholecystitis and gallstones    cold knife conization  2014    COLECTOMY, RIGHT  9/17/2020    Procedure: COLECTOMY, RIGHT Extended;  Surgeon: Hammad Reynolds MD;  Location: [facility] OR 2ND FLR;  Service: Colon and Rectal;;    COLONOSCOPY  2014    COLONOSCOPY N/A [facility]/24/2016    [facility]    COLONOSCOPY N/A 5/18/2018    Procedure: COLONOSCOPY;  Surgeon: Arden Gutiérrez MD;  Location: [facility] (4TH FLR);  Service: Endoscopy;  Laterality: N/A;    COLONOSCOPY N/A 7/28/2020    Procedure: COLONOSCOPY;  Surgeon: Hammad Reynolds MD;  Location: [facility] ENDO (4TH FLR);  Service: Colon and Rectal;  Laterality: N/A;  covid test [place] 7/25    CYSTOSCOPY WITH URETEROSCOPY, RETROGRADE PYELOGRAPHY, AND INSERTION OF STENT Bilateral 3/21/2020    Procedure: CYSTOSCOPY, WITH RETROGRADE PYELOGRAM,;  Surgeon: Leslie Balbuena MD;  Location: [facility] OR 1ST FLR;  Service: Urology;  Laterality: Bilateral;    DILATION OF NEPHROSTOMY TRACT Right 10/27/2022    Procedure: DILATION, NEPHROSTOMY TRACT;  Surgeon: Chirag Russ MD;  Location: [facility] OR 1ST FLR;  Service:  Urology;  Laterality: Right;    ESOPHAGOGASTRODUODENOSCOPY  2014    ESOPHAGOGASTRODUODENOSCOPY  11/18/2020    ESOPHAGOGASTRODUODENOSCOPY N/A 11/18/2020    Procedure: ESOPHAGOGASTRODUODENOSCOPY (EGD);  Surgeon: Zenon Spencer MD;  Location: [facility] ENDO;  Service: Endoscopy;  Laterality: N/A;    ESOPHAGOGASTRODUODENOSCOPY N/A 12/11/2020    Procedure: EGD (ESOPHAGOGASTRODUODENOSCOPY);  Surgeon: Juancho Muse MD;  Location: [facility] (2ND FLR);  Service: Endoscopy;  Laterality: N/A;    HYSTERECTOMY  2014    [facility] for cervical cancer    ILEOSTOMY  9/17/2020    Procedure: CREATION, ILEOSTOMY;  Surgeon: Hammad Reynolds MD;  Location: [facility] OR 2ND FLR;  Service: Colon and Rectal;;    LOOPOGRAM N/A 4/20/2021    Procedure: LOOPOGRAM;  Surgeon: Leslie Balbuena MD;  Location: [facility] OR 1ST FLR;  Service: Urology;  Laterality: N/A;    LOOPOGRAM N/A 6/6/2023    Procedure: LOOPOGRAM;  Surgeon: Leslie Balbuena MD;  Location: [facility] OR 1ST FLR;  Service: Urology;  Laterality: N/A;    MOBILIZATION OF SPLENIC FLEXURE N/A 9/11/2020    Procedure: MOBILIZATION, COLONIC;  Surgeon: Hammad Reynolds MD;  Location: [facility] OR 2ND FLR;  Service: Colon and Rectal;  Laterality: N/A;    NEPHROSTOGRAPHY Bilateral 4/17/2021    Procedure: Nephrostogram;  Surgeon: Celeste Surgeon;  Location: [facility];  Service: Anesthesiology;  Laterality: Bilateral;    OOPHORECTOMY      PERCUTANEOUS NEPHROLITHOTOMY Right 4/21/2023    Procedure: NEPHROLITHOTOMY, PERCUTANEOUS;  Surgeon: Chirag Russ MD;  Location: [facility] OR 2ND FLR;  Service: Urology;  Laterality: Right;  2.5 hrs    PERCUTANEOUS NEPHROSTOMY  4/21/2023    Procedure: CREATION, NEPHROSTOMY, PERCUTANEOUS with removal of existing nephrostomy tube;  Surgeon: Chirag Russ MD;  Location: [facility] OR 2ND FLR;  Service: Urology;;    PYELOSCOPY Right 10/27/2022    Procedure: PYELOSCOPY;  Surgeon: Chirag Russ MD;  Location: [facility] OR [address] [place] [address];  Service: Urology;  Laterality: Right;    REPLACEMENT OF  NEPHROSTOMY TUBE Right 10/27/2022    Procedure: REPLACEMENT, NEPHROSTOMY TUBE;  Surgeon: Chirag Russ MD;  Location: [facility] OR 1ST FLR;  Service: Urology;  Laterality: Right;    RETROPERITONEAL LYMPHADENECTOMY Right 9/17/2018    Procedure: LYMPHADENECTOMY, RETROPERITONEUM;  Surgeon: Sebas Reed MD;  Location: [facility] OR 2ND FLR;  Service: General;  Laterality: Right;  ILIAC    URETEROSCOPIC REMOVAL OF URETERIC CALCULUS Right 10/27/2022    Procedure: REMOVAL, CALCULUS, URETER, URETEROSCOPIC;  Surgeon: Chirag Russ MD;  Location: [facility] OR 1ST FLR;  Service: Urology;  Laterality: Right;    URETEROSCOPY Right 10/27/2022    Procedure: URETEROSCOPY-ANTEGRADE;  Surgeon: Chirag Russ MD;  Location: [facility] OR 1ST FLR;  Service: Urology;  Laterality: Right;       Review of patient's allergies indicates:   Allergen Reactions    Bee sting [allergen ext-venom-honey bee]      Rash      Grass pollen-bermuda, standard      rash       Family History       Problem Relation (Age of Onset)    Breast cancer Maternal Aunt    Cancer Father, Mother    Cervical cancer Mother    Colon cancer Father    Drug abuse Daughter, Daughter    Esophageal cancer Father    Heart disease Sister, Maternal Grandmother    Suicide Daughter            Tobacco Use    Smoking status: Never    Smokeless tobacco: Never   Substance and Sexual Activity    Alcohol use: No     Alcohol/week: 0.0 standard drinks of alcohol    Drug use: No    Sexual activity: Yes     Partners: Male     Birth control/protection: None     Comment:  19 years since 1999       Review of Systems   Constitutional:  Positive for chills and fever.   HENT:  Negative for trouble swallowing.    Respiratory:  Negative for cough and shortness of breath.    Cardiovascular:  Negative for chest pain.   Gastrointestinal:  Negative for abdominal distention, abdominal pain and vomiting.   Genitourinary:  Positive for flank pain. Negative for decreased urine volume and dysuria.    Musculoskeletal:  Negative for back pain.   Skin:  Negative for pallor.   Neurological:  Negative for weakness and numbness.   Psychiatric/Behavioral:  Negative for agitation.        Objective:     Temp:  [97.7 °F (36.5 °C)-98.9 °F (37.2 °C)] 97.7 °F (36.5 °C)  Pulse:  [77-97] 96  Resp:  [15-18] 16  SpO2:  [94 %-100 %] 97 %  BP: ()/(58-73) 100/58  Weight: 70.8 kg (156 lb)  Body mass index is 23.04 kg/m².    Date 09/10/23 0700 - 09/11/23 0659   Shift 6313-3170 1117-8790 6946-2382 24 Hour Total   INTAKE   Shift Total(mL/kg)       OUTPUT   Urine(mL/kg/hr) 225   225   Stool 75   75   Shift Total(mL/kg) 300(4.2)   300(4.2)   Weight (kg) 70.8 70.8 70.8 70.8          Drains       Drain  Duration                  Urostomy 09/11/20 0000 ileal conduit LLQ 1094 days         Ileostomy 09/18/20 RLQ 1087 days         Nephrostomy 06/07/23 1349 Left 12 Fr. 95 days                     Physical Exam  Vitals reviewed.   Constitutional:       General: She is not in acute distress.     Appearance: She is not toxic-appearing or diaphoretic.   HENT:      Head: Normocephalic and atraumatic.      Nose: Nose normal.   Eyes:      Conjunctiva/sclera: Conjunctivae normal.   Cardiovascular:      Rate and Rhythm: Normal rate.   Pulmonary:      Effort: Pulmonary effort is normal. No respiratory distress.   Abdominal:      General: Abdomen is flat. There is no distension.      Palpations: Abdomen is soft.      Tenderness: There is right CVA tenderness.      Comments: Urostomy draining c/y/u.  Left nephrostomy open to drainage.   Musculoskeletal:         General: No swelling or deformity. Normal range of motion.      Cervical back: Normal range of motion.   Skin:     General: Skin is warm and dry.      Findings: No erythema.   Neurological:      General: No focal deficit present.      Mental Status: She is alert.      Motor: No weakness.   Psychiatric:         Mood and Affect: Mood normal.         Behavior: Behavior normal.         Thought  Content: Thought content normal.          Significant Labs:    BMP:  Recent Labs   Lab 09/09/23  1359 09/10/23  0438   * 140   K 4.3 4.8    111*   CO2 10* 12*   BUN 66* 66*   CREATININE 3.7* 2.8*   CALCIUM 10.2 9.6       CBC:  Recent Labs   Lab 09/09/23  1359 09/10/23  0438   WBC 11.03 11.91   HGB 15.9 14.6   HCT 52.6* 46.3    309       All pertinent labs results from the past 24 hours have been reviewed.    Significant Imaging:  All pertinent imaging results/findings from the past 24 hours have been reviewed.

## 2023-09-10 NOTE — ASSESSMENT & PLAN NOTE
Mild diffuse emphysematous changes noted on Chest CT 2020. Has never smoked tobacco. CXR on admission stable.  - Consider addition of albuterol nebs/breathing treatments if indicated

## 2023-09-10 NOTE — ASSESSMENT & PLAN NOTE
QTc on admission 451 ms.   Documented QTc of 605 ms in April 2021.   Cautious utilization of QT prolonging medications.

## 2023-09-10 NOTE — CONSULTS
58-yo female bilateral ureteral stricture secondary to radiation therapy for cervical cancer sp urinary diversion with left nephrostomy tube in place. Patient admitted for ODESSA and is past due for routine left nephrostomy tube exchange for which IR was consulted. Tentatively planning procedure for tomorrow (9/11). Please place patient NPO at midnight on date of procedure.    Full H&P to follow.    Tony Andujar MD PGY-3  Department of Radiology  [facility]

## 2023-09-10 NOTE — ASSESSMENT & PLAN NOTE
"Per PCP note in April: "Had not seizure in a long time - - Sounds like Medication related   No vascular stenting"  - Noted, carbapenems lower seizure threshold, however at this time benefits of treatment appear to outweigh risks. Has tolerated ertapenem in the past.     "

## 2023-09-11 PROBLEM — N12 PYELONEPHRITIS: Status: RESOLVED | Noted: 2020-06-11 | Resolved: 2023-09-11

## 2023-09-11 PROBLEM — N39.0 UTI (URINARY TRACT INFECTION): Status: RESOLVED | Noted: 2023-02-16 | Resolved: 2023-09-11

## 2023-09-11 LAB
ALBUMIN SERPL BCP-MCNC: 2.8 G/DL (ref 3.5–5.2)
ALBUMIN SERPL BCP-MCNC: 3.1 G/DL (ref 3.5–5.2)
ALP SERPL-CCNC: 111 U/L (ref 55–135)
ALP SERPL-CCNC: 97 U/L (ref 55–135)
ALT SERPL W/O P-5'-P-CCNC: 24 U/L (ref 10–44)
ALT SERPL W/O P-5'-P-CCNC: 25 U/L (ref 10–44)
ANION GAP SERPL CALC-SCNC: 11 MMOL/L (ref 8–16)
ANION GAP SERPL CALC-SCNC: 9 MMOL/L (ref 8–16)
AST SERPL-CCNC: 27 U/L (ref 10–40)
AST SERPL-CCNC: 30 U/L (ref 10–40)
BASOPHILS # BLD AUTO: 0.02 K/UL (ref 0–0.2)
BASOPHILS # BLD AUTO: 0.02 K/UL (ref 0–0.2)
BASOPHILS NFR BLD: 0.3 % (ref 0–1.9)
BASOPHILS NFR BLD: 0.3 % (ref 0–1.9)
BILIRUB SERPL-MCNC: 0.4 MG/DL (ref 0.1–1)
BILIRUB SERPL-MCNC: 0.5 MG/DL (ref 0.1–1)
BUN SERPL-MCNC: 33 MG/DL (ref 6–20)
BUN SERPL-MCNC: 49 MG/DL (ref 6–20)
CALCIUM SERPL-MCNC: 8.7 MG/DL (ref 8.7–10.5)
CALCIUM SERPL-MCNC: 9.3 MG/DL (ref 8.7–10.5)
CHLORIDE SERPL-SCNC: 113 MMOL/L (ref 95–110)
CHLORIDE SERPL-SCNC: 113 MMOL/L (ref 95–110)
CO2 SERPL-SCNC: 17 MMOL/L (ref 23–29)
CO2 SERPL-SCNC: 21 MMOL/L (ref 23–29)
CREAT SERPL-MCNC: 1.3 MG/DL (ref 0.5–1.4)
CREAT SERPL-MCNC: 1.9 MG/DL (ref 0.5–1.4)
DIFFERENTIAL METHOD: ABNORMAL
DIFFERENTIAL METHOD: ABNORMAL
EOSINOPHIL # BLD AUTO: 0 K/UL (ref 0–0.5)
EOSINOPHIL # BLD AUTO: 0.1 K/UL (ref 0–0.5)
EOSINOPHIL NFR BLD: 0.7 % (ref 0–8)
EOSINOPHIL NFR BLD: 1.3 % (ref 0–8)
ERYTHROCYTE [DISTWIDTH] IN BLOOD BY AUTOMATED COUNT: 14.8 % (ref 11.5–14.5)
ERYTHROCYTE [DISTWIDTH] IN BLOOD BY AUTOMATED COUNT: 14.9 % (ref 11.5–14.5)
EST. GFR  (NO RACE VARIABLE): 29.9 ML/MIN/1.73 M^2
EST. GFR  (NO RACE VARIABLE): 47.1 ML/MIN/1.73 M^2
GLUCOSE SERPL-MCNC: 102 MG/DL (ref 70–110)
GLUCOSE SERPL-MCNC: 94 MG/DL (ref 70–110)
HCT VFR BLD AUTO: 35.3 % (ref 37–48.5)
HCT VFR BLD AUTO: 43.4 % (ref 37–48.5)
HGB BLD-MCNC: 10.8 G/DL (ref 12–16)
HGB BLD-MCNC: 13.3 G/DL (ref 12–16)
IMM GRANULOCYTES # BLD AUTO: 0.02 K/UL (ref 0–0.04)
IMM GRANULOCYTES # BLD AUTO: 0.02 K/UL (ref 0–0.04)
IMM GRANULOCYTES NFR BLD AUTO: 0.3 % (ref 0–0.5)
IMM GRANULOCYTES NFR BLD AUTO: 0.3 % (ref 0–0.5)
INR PPP: 1 (ref 0.8–1.2)
LYMPHOCYTES # BLD AUTO: 1.1 K/UL (ref 1–4.8)
LYMPHOCYTES # BLD AUTO: 1.3 K/UL (ref 1–4.8)
LYMPHOCYTES NFR BLD: 18.4 % (ref 18–48)
LYMPHOCYTES NFR BLD: 21 % (ref 18–48)
MAGNESIUM SERPL-MCNC: 2 MG/DL (ref 1.6–2.6)
MAGNESIUM SERPL-MCNC: 2.4 MG/DL (ref 1.6–2.6)
MCH RBC QN AUTO: 27.2 PG (ref 27–31)
MCH RBC QN AUTO: 27.3 PG (ref 27–31)
MCHC RBC AUTO-ENTMCNC: 30.6 G/DL (ref 32–36)
MCHC RBC AUTO-ENTMCNC: 30.6 G/DL (ref 32–36)
MCV RBC AUTO: 89 FL (ref 82–98)
MCV RBC AUTO: 89 FL (ref 82–98)
MONOCYTES # BLD AUTO: 1.2 K/UL (ref 0.3–1)
MONOCYTES # BLD AUTO: 1.4 K/UL (ref 0.3–1)
MONOCYTES NFR BLD: 19.7 % (ref 4–15)
MONOCYTES NFR BLD: 21.5 % (ref 4–15)
NEUTROPHILS # BLD AUTO: 3.5 K/UL (ref 1.8–7.7)
NEUTROPHILS # BLD AUTO: 3.6 K/UL (ref 1.8–7.7)
NEUTROPHILS NFR BLD: 55.6 % (ref 38–73)
NEUTROPHILS NFR BLD: 60.6 % (ref 38–73)
NRBC BLD-RTO: 0 /100 WBC
NRBC BLD-RTO: 0 /100 WBC
PHOSPHATE SERPL-MCNC: 2.3 MG/DL (ref 2.7–4.5)
PHOSPHATE SERPL-MCNC: 2.5 MG/DL (ref 2.7–4.5)
PLATELET # BLD AUTO: 262 K/UL (ref 150–450)
PLATELET # BLD AUTO: 296 K/UL (ref 150–450)
PMV BLD AUTO: 11 FL (ref 9.2–12.9)
PMV BLD AUTO: 11.2 FL (ref 9.2–12.9)
POCT GLUCOSE: 125 MG/DL (ref 70–110)
POCT GLUCOSE: 96 MG/DL (ref 70–110)
POCT GLUCOSE: 97 MG/DL (ref 70–110)
POCT GLUCOSE: 98 MG/DL (ref 70–110)
POTASSIUM SERPL-SCNC: 3.5 MMOL/L (ref 3.5–5.1)
POTASSIUM SERPL-SCNC: 3.8 MMOL/L (ref 3.5–5.1)
PROT SERPL-MCNC: 6.9 G/DL (ref 6–8.4)
PROT SERPL-MCNC: 7.9 G/DL (ref 6–8.4)
PROTHROMBIN TIME: 10.9 SEC (ref 9–12.5)
RBC # BLD AUTO: 3.97 M/UL (ref 4–5.4)
RBC # BLD AUTO: 4.88 M/UL (ref 4–5.4)
SODIUM SERPL-SCNC: 141 MMOL/L (ref 136–145)
SODIUM SERPL-SCNC: 143 MMOL/L (ref 136–145)
WBC # BLD AUTO: 5.99 K/UL (ref 3.9–12.7)
WBC # BLD AUTO: 6.28 K/UL (ref 3.9–12.7)

## 2023-09-11 PROCEDURE — 11000001 HC ACUTE MED/SURG PRIVATE ROOM

## 2023-09-11 PROCEDURE — 83735 ASSAY OF MAGNESIUM: CPT | Mod: 91

## 2023-09-11 PROCEDURE — 63600175 PHARM REV CODE 636 W HCPCS

## 2023-09-11 PROCEDURE — 87077 CULTURE AEROBIC IDENTIFY: CPT

## 2023-09-11 PROCEDURE — 87186 SC STD MICRODIL/AGAR DIL: CPT

## 2023-09-11 PROCEDURE — 83735 ASSAY OF MAGNESIUM: CPT

## 2023-09-11 PROCEDURE — 36415 COLL VENOUS BLD VENIPUNCTURE: CPT | Performed by: SURGERY

## 2023-09-11 PROCEDURE — 99233 SBSQ HOSP IP/OBS HIGH 50: CPT | Mod: ,,, | Performed by: HOSPITALIST

## 2023-09-11 PROCEDURE — 25000003 PHARM REV CODE 250

## 2023-09-11 PROCEDURE — 80053 COMPREHEN METABOLIC PANEL: CPT

## 2023-09-11 PROCEDURE — 25000003 PHARM REV CODE 250: Performed by: STUDENT IN AN ORGANIZED HEALTH CARE EDUCATION/TRAINING PROGRAM

## 2023-09-11 PROCEDURE — 99233 PR SUBSEQUENT HOSPITAL CARE,LEVL III: ICD-10-PCS | Mod: ,,, | Performed by: PHYSICIAN ASSISTANT

## 2023-09-11 PROCEDURE — 63600175 PHARM REV CODE 636 W HCPCS: Performed by: STUDENT IN AN ORGANIZED HEALTH CARE EDUCATION/TRAINING PROGRAM

## 2023-09-11 PROCEDURE — 99233 PR SUBSEQUENT HOSPITAL CARE,LEVL III: ICD-10-PCS | Mod: ,,, | Performed by: HOSPITALIST

## 2023-09-11 PROCEDURE — 87086 URINE CULTURE/COLONY COUNT: CPT

## 2023-09-11 PROCEDURE — 85610 PROTHROMBIN TIME: CPT | Performed by: SURGERY

## 2023-09-11 PROCEDURE — 87088 URINE BACTERIA CULTURE: CPT

## 2023-09-11 PROCEDURE — 84100 ASSAY OF PHOSPHORUS: CPT

## 2023-09-11 PROCEDURE — 85025 COMPLETE CBC W/AUTO DIFF WBC: CPT | Mod: 91

## 2023-09-11 PROCEDURE — 99233 SBSQ HOSP IP/OBS HIGH 50: CPT | Mod: ,,, | Performed by: PHYSICIAN ASSISTANT

## 2023-09-11 PROCEDURE — 85025 COMPLETE CBC W/AUTO DIFF WBC: CPT

## 2023-09-11 PROCEDURE — 36415 COLL VENOUS BLD VENIPUNCTURE: CPT

## 2023-09-11 PROCEDURE — 80053 COMPREHEN METABOLIC PANEL: CPT | Mod: 91

## 2023-09-11 PROCEDURE — 84100 ASSAY OF PHOSPHORUS: CPT | Mod: 91

## 2023-09-11 RX ORDER — MIDAZOLAM HYDROCHLORIDE 1 MG/ML
INJECTION INTRAMUSCULAR; INTRAVENOUS
Status: COMPLETED | OUTPATIENT
Start: 2023-09-11 | End: 2023-09-11

## 2023-09-11 RX ORDER — FENTANYL CITRATE 50 UG/ML
INJECTION, SOLUTION INTRAMUSCULAR; INTRAVENOUS
Status: COMPLETED | OUTPATIENT
Start: 2023-09-11 | End: 2023-09-11

## 2023-09-11 RX ORDER — LORATADINE 10 MG/1
10 TABLET ORAL DAILY PRN
COMMUNITY
Start: 2023-09-05

## 2023-09-11 RX ORDER — SODIUM CHLORIDE, SODIUM LACTATE, POTASSIUM CHLORIDE, CALCIUM CHLORIDE 600; 310; 30; 20 MG/100ML; MG/100ML; MG/100ML; MG/100ML
INJECTION, SOLUTION INTRAVENOUS CONTINUOUS
Status: DISCONTINUED | OUTPATIENT
Start: 2023-09-11 | End: 2023-09-12

## 2023-09-11 RX ORDER — HEPARIN SODIUM 5000 [USP'U]/ML
5000 INJECTION, SOLUTION INTRAVENOUS; SUBCUTANEOUS EVERY 8 HOURS
Status: DISCONTINUED | OUTPATIENT
Start: 2023-09-11 | End: 2023-09-14 | Stop reason: HOSPADM

## 2023-09-11 RX ADMIN — HEPARIN SODIUM 5000 UNITS: 5000 INJECTION INTRAVENOUS; SUBCUTANEOUS at 09:09

## 2023-09-11 RX ADMIN — CEFTRIAXONE SODIUM 1 G: 2 INJECTION, POWDER, FOR SOLUTION INTRAMUSCULAR; INTRAVENOUS at 10:09

## 2023-09-11 RX ADMIN — FENTANYL CITRATE 50 MCG: 0.05 INJECTION, SOLUTION INTRAMUSCULAR; INTRAVENOUS at 10:09

## 2023-09-11 RX ADMIN — MIDAZOLAM HYDROCHLORIDE 1 MG: 2 INJECTION, SOLUTION INTRAMUSCULAR; INTRAVENOUS at 10:09

## 2023-09-11 RX ADMIN — DIBASIC SODIUM PHOSPHATE, MONOBASIC POTASSIUM PHOSPHATE AND MONOBASIC SODIUM PHOSPHATE 2 TABLET: 852; 155; 130 TABLET ORAL at 12:09

## 2023-09-11 RX ADMIN — SODIUM CHLORIDE, POTASSIUM CHLORIDE, SODIUM LACTATE AND CALCIUM CHLORIDE 1000 ML: 600; 310; 30; 20 INJECTION, SOLUTION INTRAVENOUS at 09:09

## 2023-09-11 RX ADMIN — ERTAPENEM 500 MG: 1 INJECTION INTRAMUSCULAR; INTRAVENOUS at 12:09

## 2023-09-11 RX ADMIN — SODIUM CHLORIDE, POTASSIUM CHLORIDE, SODIUM LACTATE AND CALCIUM CHLORIDE: 600; 310; 30; 20 INJECTION, SOLUTION INTRAVENOUS at 03:09

## 2023-09-11 NOTE — ASSESSMENT & PLAN NOTE
History of DVT in 2020, likely provoked due to prolonged/recurrent hospitalizations. Previously treated with Eliquis, no longer taking.     - DVT ppx w heparin  - held for IR procedure today 9/11/23, may restart tonight

## 2023-09-11 NOTE — ASSESSMENT & PLAN NOTE
58F with complicated urologic history as above     - S/p left nephrostomy tube exchange on 9/11/23.   - ODESSA resolved with fluids.   - strict I/s and O/s  - urine culture GNR and Enterobacter  - blood cx NGTD    - appreciate ostomy consult for significant leakage around ostomy appliance    - No further plans for inpatient Urologic intervention. Please call with questions or concerns.   - Will arrange outpatient Urologic follow up   - Rest of care per primary

## 2023-09-11 NOTE — ASSESSMENT & PLAN NOTE
58 year old woman with hx of cervical CA c/b bilateral ureteral strictures requiring bilateral nephrostomy tubes (currently left tube remains only; right removed in April ), s/p urinary to colon conduit 2020 c/b bowel perforation s/p right hemicolectomy and ostomy and recurrent MDR infections , QTc prolongation, PTSD, major depression who presented with fatigue and abdominal pain X 2 weeks.  Noted to have ODESSA (creatinine 3.7, baseline 1.1), U/A >100 WBC (unclear source).  CT A/P with right sided hydronephrosis with thickening of the proximal right ureter and concern for pyelitis. Left NT shown to be in good position.  ID consulted for pyelitis.       Complicated urologic history.  Most recently had antegrade nephrostogram and loopogram 6/6/223 which showed reflux on the right  And left distal ureteral stricture.  Left nephrostomy tube last changed on 6/7.  Urology consulted to assess for need for inpatient NT exchange.  Evaluated today and recommends left NT exchange with IR. Reviewed CT and determined right hydronephrosis stable.  No further intervention recommended at this time.      Blood cultures NGTD. Urine culture pending. VSS.  She is currently on IV ertapenem given hx of MDR UTI.     Plan:  1. Continue IV ertapenem 500 mg q 24 hours (renally dosed)  2. Follow up urine culture and adjust antibiotics accordingly  3. IR tentatively planning left NT replacement tomorrow   4. Will follow up tomorrow.     Data reviewed and plan discussed with ID staff, Dr. Flores  Secure chat/Discussed above plan with Primary Team,

## 2023-09-11 NOTE — SUBJECTIVE & OBJECTIVE
Interval History: NAEON. Patient NPO at midnight for IR replacement of L nephrostomy tube today. Heparin held.     Review of Systems   Constitutional:  Positive for fatigue and fever. Negative for chills.   HENT:  Negative for sore throat and trouble swallowing.    Eyes:  Negative for visual disturbance.   Respiratory:  Negative for chest tightness, shortness of breath and wheezing.    Cardiovascular:  Negative for chest pain and leg swelling.   Gastrointestinal:  Positive for abdominal pain, nausea and vomiting. Negative for abdominal distention, blood in stool, constipation and diarrhea.   Genitourinary:  Negative for decreased urine volume, difficulty urinating, hematuria and pelvic pain.   Musculoskeletal:  Negative for back pain.   Skin:  Negative for wound.   Neurological:  Negative for dizziness, syncope, light-headedness and headaches.   Psychiatric/Behavioral:  Negative for dysphoric mood. The patient is not nervous/anxious.      Objective:     Vital Signs (Most Recent):  Temp: 98.2 °F (36.8 °C) (09/11/23 1146)  Pulse: 76 (09/11/23 1146)  Resp: 18 (09/11/23 1146)  BP: 131/60 (09/11/23 1146)  SpO2: 99 % (09/11/23 1146) Vital Signs (24h Range):  Temp:  [97.3 °F (36.3 °C)-98.6 °F (37 °C)] 98.2 °F (36.8 °C)  Pulse:  [71-98] 76  Resp:  [14-20] 18  SpO2:  [97 %-100 %] 99 %  BP: ()/(53-90) 131/60     Weight: 70.8 kg (156 lb)  Body mass index is 23.04 kg/m².    Intake/Output Summary (Last 24 hours) at 9/11/2023 1443  Last data filed at 9/11/2023 0800  Gross per 24 hour   Intake 0 ml   Output 1150 ml   Net -1150 ml         Physical Exam  Vitals and nursing note reviewed.   Constitutional:       General: She is not in acute distress.     Appearance: She is ill-appearing.      Comments: Chronically ill appearing female, no acute distress but in obvious discomfort   HENT:      Head: Normocephalic and atraumatic.   Eyes:      General: No scleral icterus.  Cardiovascular:      Rate and Rhythm: Normal rate and  regular rhythm.      Heart sounds: No murmur heard.  Pulmonary:      Effort: Pulmonary effort is normal. No respiratory distress.      Breath sounds: Normal breath sounds. No wheezing or rales.   Abdominal:      General: Abdomen is flat. There is no distension.      Palpations: Abdomen is soft.      Tenderness: There is abdominal tenderness. There is guarding (voluntary).      Comments: Ileostomy bag in place with brown output  Urostomy and nephrostomy bag in place with yellow, non bloody urine  Ostomy sites do not appear irritated or inflamed  Abdomen diffusely tender to palpation  Unable to assess CVA tenderness due to body positioning and pt weakness limiting ability to participate   Musculoskeletal:      Cervical back: Normal range of motion. No rigidity.      Right lower leg: No edema.      Left lower leg: No edema.   Skin:     General: Skin is warm and dry.   Neurological:      General: No focal deficit present.      Mental Status: She is alert.      Motor: Weakness (diffuse) present.   Psychiatric:         Behavior: Behavior normal.         Thought Content: Thought content normal.             Significant Labs: All pertinent labs within the past 24 hours have been reviewed.  Blood Culture:   Recent Labs   Lab 09/09/23  1806 09/09/23  1807   LABBLOO No Growth to date  No Growth to date No Growth to date  No Growth to date     CBC:   Recent Labs   Lab 09/10/23  0438 09/11/23  0612   WBC 11.91 5.99   HGB 14.6 13.3   HCT 46.3 43.4    296     CMP:   Recent Labs   Lab 09/10/23  0438 09/11/23  0612    141   K 4.8 3.8   * 113*   CO2 12* 17*   GLU 93 102   BUN 66* 49*   CREATININE 2.8* 1.9*   CALCIUM 9.6 9.3   PROT 9.3* 7.9   ALBUMIN 3.4* 3.1*   BILITOT 0.3 0.5   ALKPHOS 118 111   AST 26 27   ALT 27 24   ANIONGAP 17* 11         Significant Imaging: I have reviewed all pertinent imaging results/findings within the past 24 hours.

## 2023-09-11 NOTE — SUBJECTIVE & OBJECTIVE
Interval History: NAEON. AFVSS. Blood culture NGTD. Urine culture GNR and Enterobacter. S/p left nephrostomy tube exchange. Patient reports feeling well.      Objective:     Temp:  [97.3 °F (36.3 °C)-98.3 °F (36.8 °C)] 97.3 °F (36.3 °C)  Pulse:  [75-98] 75  Resp:  [16-18] 18  SpO2:  [97 %-100 %] 100 %  BP: ()/(58-90) 113/63     Body mass index is 23.04 kg/m².           Drains       Drain  Duration                  Urostomy 09/11/20 0000 ileal conduit LLQ 1095 days         Ileostomy 09/18/20 RLQ 1088 days         Nephrostomy 06/07/23 1349 Left 12 Fr. 95 days                     Physical Exam  Vitals reviewed.   Constitutional:       General: She is not in acute distress.     Appearance: She is not toxic-appearing or diaphoretic.   HENT:      Head: Normocephalic and atraumatic.      Nose: Nose normal.   Eyes:      Conjunctiva/sclera: Conjunctivae normal.   Cardiovascular:      Rate and Rhythm: Normal rate.   Pulmonary:      Effort: Pulmonary effort is normal. No respiratory distress.   Abdominal:      General: Abdomen is flat. There is no distension.      Palpations: Abdomen is soft.      Tenderness: There is no right CVA tenderness or left CVA tenderness.      Comments: Urostomy draining c/y/u. Ostomy with stool in appliance. Left nephrostomy draining clear yellow urine.    Musculoskeletal:         General: No swelling or deformity. Normal range of motion.      Cervical back: Normal range of motion.   Skin:     General: Skin is warm and dry.      Findings: No erythema.   Neurological:      General: No focal deficit present.      Mental Status: She is alert.      Motor: No weakness.   Psychiatric:         Mood and Affect: Mood normal.         Behavior: Behavior normal.         Thought Content: Thought content normal.           Significant Labs:    BMP:  Recent Labs   Lab 09/09/23  1359 09/10/23  0438   * 140   K 4.3 4.8    111*   CO2 10* 12*   BUN 66* 66*   CREATININE 3.7* 2.8*   CALCIUM 10.2 9.6        CBC:   Recent Labs   Lab 09/09/23  1359 09/10/23  0438   WBC 11.03 11.91   HGB 15.9 14.6   HCT 52.6* 46.3    309       Urine Studies:   Recent Labs   Lab 09/09/23  1416   COLORU Yellow   APPEARANCEUA Ex.Turbid   PHUR 7.0   SPECGRAV 1.015   PROTEINUA 2+*   GLUCUA Negative   KETONESU Negative   BILIRUBINUA Negative   OCCULTUA 1+*   NITRITE Negative   LEUKOCYTESUR 3+*   RBCUA 8*   WBCUA >100*   BACTERIA Occasional   HYALINECASTS 0     All pertinent labs results from the past 24 hours have been reviewed.    Significant Imaging:  All pertinent imaging results/findings from the past 24 hours have been reviewed.

## 2023-09-11 NOTE — ASSESSMENT & PLAN NOTE
Patient with acute kidney injury/acute renal failure. Differential is broad, consider post-renal due to known history of ureteral obstruction. Consider pre-renal/intrarenal in setting of active infection, though she is currently hemodynamically stable and withought leukocytosis, decreasing concern for sepsis/hypoperfusion. Not currently taking any nephrotoxic medications.   ODESSA is currently stable. Baseline creatinine 1.1 - Labs reviewed- Renal function/electrolytes with Estimated Creatinine Clearance: 32.9 mL/min (A) (based on SCr of 1.9 mg/dL (H)). according to latest data. Monitor urine output and serial BMP and adjust therapy as needed. Avoid nephrotoxins and renally dose meds for GFR listed above.    CT AP w continued moderate R sided hydronephrosis w mild wall thickening of R prox ureter; L sided percutaneous nephrostomy tube in place; no L sided hydro    Plan:  - Will defer RP US at this time given visualization of kidneys on CT  - F/u urine lytes  - S/p 500cc bolus in ED, will administer another 500cc  - L nephrostomy replaced today 9/11/23  - Cr improving   - encourage OOB

## 2023-09-11 NOTE — ASSESSMENT & PLAN NOTE
This is a 58 year old female with complicated medical history including cervical cancer in 2014 s/p chemotherapy and XRT who then developed ureteral stricture 2/2 XRT s/p bilateral nephrostomy tubes (R nephrostomy tube now removed, L tube remains in place) and transverse colon-urinary conduit in 2020, which was c/b bowel perforation requiring right hemicolectomy and ostomy creation, all of which has been complicated by recurrent MDR infections presenting for worsening fatigue and abdominal pain x 2 weeks. No changes in consistency or frequency of ileostomy/urostomy/nephrostomy output.    - She has had significant ID history, including multiple MDR UTIs ( Enterobacter cloacae 5/30, Klebsiella penumoniae ESBL 5/11, E faecium VRE 4/18, and Klebsiella pneumoniae ESBL 2/14). She has received ertapenem and meropenem in the past. Blood cultures in 2020 also with yeast requiring micafungin.   - On admission, she is HDS and without leukocytosis (WBC 11)  - UA w 3+ leuks but negative nitrites and only occasional bacteria. Not overwhelmingly concerning for infection, however given her history and imaging findings, will continue with treatment    Plan:  - Initiate ertapenem while awaiting further results  - F/u urine and blood cultures  - F/u ID and urology consults, appreciate assistance  - Given overall hemodynamic stability, concern for sepsis is low. Will defer lactate at this time, however can consider addition to AM labs if indicated

## 2023-09-11 NOTE — ASSESSMENT & PLAN NOTE
She is established and follows with outpatient urology. Her nephrostomy tube was last exchanged in June. She was treated with an empiric course of Cefdinir 7/26, which she states she recently completed.  - See pyelitis  - Urology consult --> non urgent replacement of L nephrostomy, IR completed procedure today 9/11/23

## 2023-09-11 NOTE — PLAN OF CARE
[facility] - [facility]  Initial Discharge Assessment       Primary Care Provider: No, Primary Doctor    Admission Diagnosis: Dehydration [E86.0]  Pyelonephritis [N12]  Nausea & vomiting [R11.2]  Acute cystitis without hematuria [N30.00]  ODESSA (acute kidney injury) [N17.9]  Chest pain [R07.9]  Diarrhea, unspecified type [R19.7]    Admission Date: 9/9/2023  Expected Discharge Date: 9/13/2023    Transition of Care Barriers: None    Payor: MEDICAID / Plan: [facility] ([employer]) / Product Type: Managed Medicaid /     Extended Emergency Contact Information  Primary Emergency Contact: Hammad Riley  Address: [address]           [place] [address] [address]  Home Phone: [phone]  Work Phone: [phone]  Mobile Phone: [phone]  Relation: Spouse  Secondary Emergency Contact: RajandeliciaFederico  Mobile Phone: [phone]  Relation: Daughter    Discharge Plan A: Home with family  Discharge Plan B: Home Health      [id number] - [place] - [address]  [address]  [place] [address]  Phone: [phone] Fax: [phone]    [facility]  [address]  [place] [address]  Phone: [phone] Fax: [phone]      Initial Assessment (most recent)       Adult Discharge Assessment - 09/11/23 1152          Discharge Assessment    Assessment Type Discharge Planning Assessment     Confirmed/corrected address, phone number and insurance Yes     Confirmed Demographics Correct on Facesheet     Source of Information patient     Does patient/caregiver understand observation status Yes     Communicated OK with patient/caregiver Yes     Reason For Admission Dehydration     People in Home spouse     Facility Arrived From: Home     Do you expect to return to your current living situation? Yes     Do you have help at home or someone to help you manage your care at home? Yes     Who are your caregiver(s) and their phone number(s)?  Allan Riley Spouse     Prior to hospitilization cognitive status: Alert/Oriented;No Deficits     Current cognitive status: Alert/Oriented;No Deficits     Walking or Climbing Stairs ambulation difficulty, requires equipment     Do you have any problems with: Errands/Grocery     Home Accessibility wheelchair accessible     Home Layout Able to live on 1st floor     Equipment Currently Used at Home oxygen;walker, rolling;wheelchair     Readmission within 30 days? Yes     Patient currently being followed by outpatient case management? No     Do you currently have service(s) that help you manage your care at home? No     Do you take prescription medications? Yes     Do you have prescription coverage? Yes     Do you have any problems affording any of your prescribed medications? No     Is the patient taking medications as prescribed? yes     Who is going to help you get home at discharge? Spouse Hammad     How do you get to doctors appointments? family or friend will provide     Are you on dialysis? No     Do you take coumadin? No     DME Needed Upon Discharge  none     Discharge Plan discussed with: Patient     Transition of Care Barriers None     Discharge Plan A Home with family     Discharge Plan B Home Health        Physical Activity    On average, how many days per week do you engage in moderate to strenuous exercise (like a brisk walk)? 1 day     On average, how many minutes do you engage in exercise at this level? 30 min        Financial Resource Strain    How hard is it for you to pay for the very basics like food, housing, medical care, and heating? Somewhat hard        Housing Stability    In the last 12 months, was there a time when you were not able to pay the mortgage or rent on time? No     In the last 12 months, how many places have you lived? 1     In the last 12 months, was there a time when you did not have a steady place to sleep or slept in a shelter (including now)? No        Transportation  Needs    In the past 12 months, has lack of transportation kept you from medical appointments or from getting medications? No     In the past 12 months, has lack of transportation kept you from meetings, work, or from getting things needed for daily living? No        Food Insecurity    Within the past 12 months, you worried that your food would run out before you got the money to buy more. Never true     Within the past 12 months, the food you bought just didn't last and you didn't have money to get more. Never true        Stress    Do you feel stress - tense, restless, nervous, or anxious, or unable to sleep at night because your mind is troubled all the time - these days? Only a little        Social Connections    In a typical week, how many times do you talk on the phone with family, friends, or neighbors? Three times a week     How often do you get together with friends or relatives? Three times a week     How often do you attend Worship or Nondenominational services? Never     Do you belong to any clubs or organizations such as Worship groups, unions, fraternal or athletic groups, or school groups? No     How often do you attend meetings of the clubs or organizations you belong to? Never     Are you , , , , never , or living with a partner?         Alcohol Use    Q1: How often do you have a drink containing alcohol? Never     Q2: How many drinks containing alcohol do you have on a typical day when you are drinking? Patient does not drink     Q3: How often do you have six or more drinks on one occasion? Never        OTHER    Name(s) of People in Home Hammad Spouse

## 2023-09-11 NOTE — NURSING
Patient received s/p Left PCN exchange in MPU bay 5. Procedure site dressing to L back is clean and dry, no evidence of bleeding or hematoma formation. Patient to recover for 1 hour and return to inpatient room. Fall precautions reviewed. Bed in lowest, locked position. Call light within reach.

## 2023-09-11 NOTE — SEDATION DOCUMENTATION
12FR neph tube placed to left mid back, pt tolerated well with no signs of distress noted. Will be transferred to MPU for 1 hour recovery before returning to unit.

## 2023-09-11 NOTE — PROGRESS NOTES
[facility] - Med Surg  Infectious Disease  Progress Note    Patient Name: Edita Riley  MRN: [id number]  Admission Date: 9/9/2023  Length of Stay: 2 days  Attending Physician: Stephanie Rosa MD  Primary Care Provider: No, Primary Doctor    Isolation Status: No active isolations  Assessment/Plan:      Renal/  * Pyelitis     58 year old woman with hx of cervical CA c/b bilateral ureteral strictures requiring bilateral nephrostomy tubes (currently left tube remains only; right removed in April ), s/p urinary to colon conduit 2020 c/b bowel perforation s/p right hemicolectomy and ostomy and recurrent MDR infections , QTc prolongation, PTSD, major depression who presented with fatigue and abdominal pain X 2 weeks.  Noted to have ODESSA (creatinine 3.7, baseline 1.1), U/A >100 WBC (unclear source).  CT A/P with right sided hydronephrosis with thickening of the proximal right ureter and concern for pyelitis. Left NT shown to be in good position.  ID consulted for pyelitis.       Complicated urologic history.  Most recently had antegrade nephrostogram and loopogram 6/6/223 which showed reflux on the right  And left distal ureteral stricture.  Left nephrostomy tube last changed on 6/7.  Urology consulted to assess for need for inpatient NT exchange.  Evaluated today and recommends left NT exchange with IR. Reviewed CT and determined right hydronephrosis stable.  No further intervention recommended at this time.      Blood cultures NGTD. Urine culture + GNR. VSS.  She is currently on IV ertapenem given hx of MDR UTI. Went for left percutaneous nephrostomy tube change today.    Recommendations  1. Continue IV ertapenem, though increase dose given improved renal function  2. Follow up urine culture and adjust antibiotics accordingly  3. Will follow up tomorrow.     Data reviewed and plan discussed with ID staff, Dr. Padilla.        Thank you for the consult. Please secure chat for any questions.  Megha Zhang  SARY    Subjective:     Principal Problem:Pyelitis    HPI: Edita Riley is a 58 year old woman with hx of cervical CA c/b bilateral ureteral strictures requiring bilateral nephrostomy tubes (currently left tube remains only), s/p urinary to colon conduit 2020 c/b bowel perforation s/p right hemicolectomy and ostomy, recurrent MDR infections , QTc prolongation, PTSD, major depression who presented for worsening fatigue and abdominal pain.  In ED afebrile, WBC 11.  Noted to have ODESSA (creatinine 3.7, baseline 1.1), U/A >100 WBC (unclear source).  CT A/P with right sided hydronephrosis with thickening of the proximal right ureter and concern for pyelitis.  ID consulted for pyelitis.      Blood cultures NGTD. Urine culture pending. VSS.  She is currently on IV ertapenem.     Interval History: NAEON. S/P left percutaneous nephrostomy tube change. Reports pain/soreness. Afebrile. Urine cx + GNR.    Review of Systems   Constitutional:  Positive for activity change, appetite change and fatigue. Negative for chills.   HENT: Negative.     Eyes: Negative.    Respiratory:  Negative for shortness of breath and wheezing.    Cardiovascular:  Negative for chest pain and leg swelling.   Gastrointestinal:  Positive for abdominal pain and nausea. Negative for abdominal distention, blood in stool, constipation and diarrhea.   Genitourinary:  Negative for decreased urine volume, difficulty urinating, hematuria and pelvic pain.        Left nephrostomy tube      Musculoskeletal:  Negative for back pain.   Skin:  Negative for wound.   Neurological:  Negative for dizziness, syncope and headaches.   Psychiatric/Behavioral:  Negative for agitation. The patient is not nervous/anxious.      Objective:     Vital Signs (Most Recent):  Temp: 98.2 °F (36.8 °C) (09/11/23 1146)  Pulse: 76 (09/11/23 1146)  Resp: 18 (09/11/23 1146)  BP: 131/60 (09/11/23 1146)  SpO2: 99 % (09/11/23 1146) Vital Signs (24h Range):  Temp:  [97.3 °F (36.3 °C)-98.6 °F  (37 °C)] 98.2 °F (36.8 °C)  Pulse:  [71-95] 76  Resp:  [14-20] 18  SpO2:  [97 %-100 %] 99 %  BP: ()/(53-90) 131/60     Weight: 70.8 kg (156 lb)  Body mass index is 23.04 kg/m².    Estimated Creatinine Clearance: 32.9 mL/min (A) (based on SCr of 1.9 mg/dL (H)).     Physical Exam  Vitals and nursing note reviewed.   Constitutional:       General: She is not in acute distress.     Appearance: She is ill-appearing (chronically ill appearing). She is not toxic-appearing or diaphoretic.   HENT:      Head: Normocephalic and atraumatic.      Mouth/Throat:      Mouth: Mucous membranes are moist.   Eyes:      General: No scleral icterus.     Conjunctiva/sclera: Conjunctivae normal.   Cardiovascular:      Rate and Rhythm: Normal rate and regular rhythm.      Heart sounds: No murmur heard.  Pulmonary:      Effort: Pulmonary effort is normal. No respiratory distress.      Breath sounds: Normal breath sounds.   Abdominal:      General: There is no distension.      Palpations: Abdomen is soft.      Tenderness: There is abdominal tenderness. There is right CVA tenderness. There is no left CVA tenderness.      Comments: Ileostomy in place   Urostomy - yellow urine   Genitourinary:     Comments: Left Nephrostomy in place - clear urine  Musculoskeletal:      Right lower leg: No edema.      Left lower leg: No edema.   Skin:     General: Skin is warm and dry.   Neurological:      Mental Status: She is alert and oriented to person, place, and time.   Psychiatric:         Mood and Affect: Mood normal.         Behavior: Behavior normal.          Significant Labs: CBC:   Recent Labs   Lab 09/10/23  0438 09/11/23 0612   WBC 11.91 5.99   HGB 14.6 13.3   HCT 46.3 43.4    296     CMP:   Recent Labs   Lab 09/10/23  0438 09/11/23 0612    141   K 4.8 3.8   * 113*   CO2 12* 17*   GLU 93 102   BUN 66* 49*   CREATININE 2.8* 1.9*   CALCIUM 9.6 9.3   PROT 9.3* 7.9   ALBUMIN 3.4* 3.1*   BILITOT 0.3 0.5   ALKPHOS 118 111   AST  26 27   ALT 27 24   ANIONGAP 17* 11     Microbiology Results (last 7 days)       Procedure Component Value Units Date/Time    Urine culture [[id number]] Collected: 09/11/23 1226    Order Status: Sent Specimen: Urine, Nephrostomy Tube, Left Updated: 09/11/23 1307    Urine culture [[id number]]     Order Status: Canceled Specimen: Urine, Nephrostomy Tube, Left     Blood Culture #2 **CANNOT BE ORDERED STAT** [[id number]] Collected: 09/09/23 1806    Order Status: Completed Specimen: Blood from Peripheral, Hand, Left Updated: 09/10/23 2012     Blood Culture, Routine No Growth to date      No Growth to date    Blood Culture #1 **CANNOT BE ORDERED STAT** [[id number]] Collected: 09/09/23 1807    Order Status: Completed Specimen: Blood from Peripheral, Hand, Right Updated: 09/10/23 2012     Blood Culture, Routine No Growth to date      No Growth to date    Urine culture [[id number]]  (Abnormal) Collected: 09/09/23 1416    Order Status: Completed Specimen: Urine Updated: 09/10/23 1952     Urine Culture, Routine GRAM NEGATIVE BALAJI  >100,000 cfu/ml  Identification and susceptibility pending      Narrative:      Specimen Source->Urine          Recent Lab Results  (Last 5 results in the past 24 hours)        09/11/23  1145   09/11/23  0836   09/11/23  0612   09/11/23  0540   09/10/23  1609        Albumin     3.1           Alkaline Phosphatase     111           ALT     24           Anion Gap     11           AST     27           Baso #     0.02           Basophil %     0.3           BILIRUBIN TOTAL     0.5  Comment: For infants and newborns, interpretation of results should be based  on gestational age, weight and in agreement with clinical  observations.    Premature Infant recommended reference ranges:  Up to 24 hours.............<8.0 mg/dL  Up to 48 hours............<12.0 mg/dL  3-5 days..................<15.0 mg/dL  6-29 days.................<15.0 mg/dL             BUN     49           Calcium     9.3           Chloride     113            CO2     17           Creatinine     1.9           Differential Method     Automated           eGFR     29.9           Eos #     0.0           Eosinophil %     0.7           Glucose     102           Gran # (ANC)     3.6           Gran %     60.6           Hematocrit     43.4           Hemoglobin     13.3           Immature Grans (Abs)     0.02  Comment: Mild elevation in immature granulocytes is non specific and   can be seen in a variety of conditions including stress response,   acute inflammation, trauma and pregnancy. Correlation with other   laboratory and clinical findings is essential.             Immature Granulocytes     0.3           INR   1.0  Comment: Coumadin Therapy:  2.0 - 3.0 for INR for all indicators except mechanical heart valves  and antiphospholipid syndromes which should use 2.5 - 3.5.               Lymph #     1.1           Lymph %     18.4           Magnesium      2.4           MCH     27.3           MCHC     30.6           MCV     89           Mono #     1.2           Mono %     19.7           MPV     11.0           nRBC     0           Phosphorus     2.5           Platelets     296           POCT Glucose 96       97   121       Potassium     3.8           PROTEIN TOTAL     7.9           Protime   10.9             RBC     4.88           RDW     14.9           Sodium     141           WBC     5.99                                  Significant Imaging:   Imaging Results              CT Abdomen Pelvis  Without Contrast (Final result)  Result time 09/09/23 20:07:17      Final result by Vinayak Baer DO (09/09/23 20:07:17)                   Impression:      1. Postoperative changes of urinary diversion with a colon conduit and left lower quadrant colostomy.  Continued moderate right-sided hydronephrosis with mild wall thickening of the right proximal ureter.  Recommend correlation with urinalysis to exclude pyelitis.  2. Left-sided percutaneous nephrostomy tube in place.  No  left-sided hydronephrosis.  3. Postoperative changes of partial colectomy with a right lower quadrant ileostomy.  No bowel obstruction or parastomal hernia.      Electronically signed by: Vinayak Baer  Date:    09/09/2023  Time:    20:07               Narrative:    EXAMINATION:  CT ABDOMEN PELVIS WITHOUT CONTRAST    CLINICAL HISTORY:  Abdominal abscess/infection suspected;Nausea/vomiting;Bowel obstruction suspected;    TECHNIQUE:  Multiplanar images were obtained of the abdomen and pelvis from the hemidiaphragms through the symphysis pubis without intravenous contrast.    COMPARISON:  CT of the abdomen and pelvis from 08/18/2023.    FINDINGS:  Lung Bases: Clear.    Heart: Heart size is normal.  No pericardial effusion.    Liver: Normal in size and attenuation without focal hepatic lesion.    Biliary tract: There is mild prominence of the common bile duct, stable from prior and likely related to cholecystectomy changes.  No intrahepatic biliary ductal a shin.    Gallbladder: Surgically absent.    Pancreas: Normal. No pancreatic ductal dilatation.    Spleen: Normal size without focal lesion.    Adrenals: Normal.    Kidneys and urinary collecting systems: There are postoperative changes of urinary diversion with a colon conduit and left lower quadrant colostomy.  There is a left-sided percutaneous nephrostomy tube with the pigtail in the renal collecting system.  There is no left-sided hydronephrosis.  There is a moderate right-sided hydronephrosis, relatively stable in comparison with prior.    Lymph nodes: None enlarged.    Stomach and bowel: The stomach is normal.  There are postop changes of partial colectomy with a right lower quadrant and ileostomy.  There is no bowel obstruction.  There is no peristomal hernia.    Peritoneum and mesentery: No ascites or free intraperitoneal air. No abdominal fluid collection.  Multiple surgical clips noted in the pelvis and lower abdomen.    Vasculature: Normal.    Urinary  bladder: The urinary bladder is contracted    Reproductive organs: The uterus is absent.    Body wall: No abnormality.    Musculoskeletal: No aggressive osseous lesion.                        Final result by Vinayak Baer DO (09/09/23 20:07:17)                   Impression:      1. Postoperative changes of urinary diversion with a colon conduit and left lower quadrant colostomy.  Continued moderate right-sided hydronephrosis with mild wall thickening of the right proximal ureter.  Recommend correlation with urinalysis to exclude pyelitis.  2. Left-sided percutaneous nephrostomy tube in place.  No left-sided hydronephrosis.  3. Postoperative changes of partial colectomy with a right lower quadrant ileostomy.  No bowel obstruction or parastomal hernia.      Electronically signed by: Vinayak Baer  Date:    09/09/2023  Time:    20:07               Narrative:    EXAMINATION:  CT ABDOMEN PELVIS WITHOUT CONTRAST    CLINICAL HISTORY:  Abdominal abscess/infection suspected;Nausea/vomiting;Bowel obstruction suspected;    TECHNIQUE:  Multiplanar images were obtained of the abdomen and pelvis from the hemidiaphragms through the symphysis pubis without intravenous contrast.    COMPARISON:  CT of the abdomen and pelvis from 08/18/2023.    FINDINGS:  Lung Bases: Clear.    Heart: Heart size is normal.  No pericardial effusion.    Liver: Normal in size and attenuation without focal hepatic lesion.    Biliary tract: There is mild prominence of the common bile duct, stable from prior and likely related to cholecystectomy changes.  No intrahepatic biliary ductal a shin.    Gallbladder: Surgically absent.    Pancreas: Normal. No pancreatic ductal dilatation.    Spleen: Normal size without focal lesion.    Adrenals: Normal.    Kidneys and urinary collecting systems: There are postoperative changes of urinary diversion with a colon conduit and left lower quadrant colostomy.  There is a left-sided percutaneous nephrostomy tube with  the pigtail in the renal collecting system.  There is no left-sided hydronephrosis.  There is a moderate right-sided hydronephrosis, relatively stable in comparison with prior.    Lymph nodes: None enlarged.    Stomach and bowel: The stomach is normal.  There are postop changes of partial colectomy with a right lower quadrant and ileostomy.  There is no bowel obstruction.  There is no peristomal hernia.    Peritoneum and mesentery: No ascites or free intraperitoneal air. No abdominal fluid collection.  Multiple surgical clips noted in the pelvis and lower abdomen.    Vasculature: Normal.    Urinary bladder: The urinary bladder is contracted    Reproductive organs: The uterus is absent.    Body wall: No abnormality.    Musculoskeletal: No aggressive osseous lesion.

## 2023-09-11 NOTE — PROGRESS NOTES
"[facility] Medicine  Progress Note    Patient Name: Edita Riley  MRN: [id number]  Patient Class: IP- Inpatient   Admission Date: 9/9/2023  Length of Stay: 2 days  Attending Physician: Stephanie Rosa MD  Primary Care Provider: Delma, Primary Doctor        Subjective:     Principal Problem:Pyelitis        HPI:  This is a 58 year old female with complicated medical history including cervical cancer in 2014 w recurrence in 2020 s/p chemotherapy and XRT, c/b bilateral ureteral strictures s/p bilateral nephrostomy tubes (R nephrostomy tube now removed, L tube remains in place) and transverse colon-urinary conduit in 2020 (this was also c/b bowel perforation requiring right hemicolectomy and ostomy creation), all of which has been complicated by recurrent MDR infections, as well as CVA at the age of 7, emphysema, htn, DVT of LE in 2020 no longer on treatment, and psychiatric comorbidities requiring prior PEC. She is presenting for worsening fatigue and abdominal pain. When asked how long she has been feeling poorly, she states "62 weeks", however when further prompted, she notes it has been exacerbated over the last 2 weeks. Her  has been ill with some sort of flu-like illness (pt unable to specify further), which she originally thought she caught from him. Today she vomited once, which prompted presentation to the ED. She describes the abdominal pain as "sharp" and located in the center of her abdomen. No aggravating or alleviating factors. Associated symptoms include subjective fever, though when she checked her temperature at home it was 98-99. She denies shortness of breath, cough, or changes in consistency or frequency of ileostomy/nephrostomy output. She has not noticed any new or worsening wounds.     She is established and follows with outpatient urology. Her nephrostomy tube was last exchanged in June, and there are plans for exchange and subsequent revision this month. She was " treated with an empiric course of Cefdinir 7/26, which she states she recently completed. Regarding her ID history, she has had multiple MDR UTIs including Enterobacter cloacae 5/30, Klebsiella penumoniae ESBL 5/11, E faecium VRE 4/18, and Klebsiella pneumoniae ESBL 2/14. She has received ertapenem and meropenem in the past. Blood cultures in 2020 also with yeast requiring micafungin.     In the ED, she was afebrile and HDS on RA. WBC 11.03. Labs also notable for significant ODESSA w BUN 66 and Cr 3.7 (recent baseline ~1.1). UA w 2+ protein, 1+ occult blood, negative nitrites, 3+ leuks, > 100 WBCs, occasional bacteria. CT AP notable for known R sided hydronephrosis w mild wall thickening of proximal R ureter, and possible pyelitis. She was treated with a 1/2L CC fluid bolus and a dose of CTX, and admitted to hospital medicine for further evaluation.       Overview/Hospital Course:  No notes on file    Interval History: NAEON. Patient NPO at midnight for IR replacement of L nephrostomy tube today. Heparin held.     Review of Systems   Constitutional:  Positive for fatigue and fever. Negative for chills.   HENT:  Negative for sore throat and trouble swallowing.    Eyes:  Negative for visual disturbance.   Respiratory:  Negative for chest tightness, shortness of breath and wheezing.    Cardiovascular:  Negative for chest pain and leg swelling.   Gastrointestinal:  Positive for abdominal pain, nausea and vomiting. Negative for abdominal distention, blood in stool, constipation and diarrhea.   Genitourinary:  Negative for decreased urine volume, difficulty urinating, hematuria and pelvic pain.   Musculoskeletal:  Negative for back pain.   Skin:  Negative for wound.   Neurological:  Negative for dizziness, syncope, light-headedness and headaches.   Psychiatric/Behavioral:  Negative for dysphoric mood. The patient is not nervous/anxious.      Objective:     Vital Signs (Most Recent):  Temp: 98.2 °F (36.8 °C) (09/11/23  1146)  Pulse: 76 (09/11/23 1146)  Resp: 18 (09/11/23 1146)  BP: 131/60 (09/11/23 1146)  SpO2: 99 % (09/11/23 1146) Vital Signs (24h Range):  Temp:  [97.3 °F (36.3 °C)-98.6 °F (37 °C)] 98.2 °F (36.8 °C)  Pulse:  [71-98] 76  Resp:  [14-20] 18  SpO2:  [97 %-100 %] 99 %  BP: ()/(53-90) 131/60     Weight: 70.8 kg (156 lb)  Body mass index is 23.04 kg/m².    Intake/Output Summary (Last 24 hours) at 9/11/2023 1443  Last data filed at 9/11/2023 0800  Gross per 24 hour   Intake 0 ml   Output 1150 ml   Net -1150 ml         Physical Exam  Vitals and nursing note reviewed.   Constitutional:       General: She is not in acute distress.     Appearance: She is ill-appearing.      Comments: Chronically ill appearing female, no acute distress but in obvious discomfort   HENT:      Head: Normocephalic and atraumatic.   Eyes:      General: No scleral icterus.  Cardiovascular:      Rate and Rhythm: Normal rate and regular rhythm.      Heart sounds: No murmur heard.  Pulmonary:      Effort: Pulmonary effort is normal. No respiratory distress.      Breath sounds: Normal breath sounds. No wheezing or rales.   Abdominal:      General: Abdomen is flat. There is no distension.      Palpations: Abdomen is soft.      Tenderness: There is abdominal tenderness. There is guarding (voluntary).      Comments: Ileostomy bag in place with brown output  Urostomy and nephrostomy bag in place with yellow, non bloody urine  Ostomy sites do not appear irritated or inflamed  Abdomen diffusely tender to palpation  Unable to assess CVA tenderness due to body positioning and pt weakness limiting ability to participate   Musculoskeletal:      Cervical back: Normal range of motion. No rigidity.      Right lower leg: No edema.      Left lower leg: No edema.   Skin:     General: Skin is warm and dry.   Neurological:      General: No focal deficit present.      Mental Status: She is alert.      Motor: Weakness (diffuse) present.   Psychiatric:          Behavior: Behavior normal.         Thought Content: Thought content normal.             Significant Labs: All pertinent labs within the past 24 hours have been reviewed.  Blood Culture:   Recent Labs   Lab 09/09/23  1806 09/09/23  1807   LABBLOO No Growth to date  No Growth to date No Growth to date  No Growth to date     CBC:   Recent Labs   Lab 09/10/23  0438 09/11/23  0612   WBC 11.91 5.99   HGB 14.6 13.3   HCT 46.3 43.4    296     CMP:   Recent Labs   Lab 09/10/23  0438 09/11/23  0612    141   K 4.8 3.8   * 113*   CO2 12* 17*   GLU 93 102   BUN 66* 49*   CREATININE 2.8* 1.9*   CALCIUM 9.6 9.3   PROT 9.3* 7.9   ALBUMIN 3.4* 3.1*   BILITOT 0.3 0.5   ALKPHOS 118 111   AST 26 27   ALT 27 24   ANIONGAP 17* 11         Significant Imaging: I have reviewed all pertinent imaging results/findings within the past 24 hours.      Assessment/Plan:      * Pyelitis  This is a 58 year old female with complicated medical history including cervical cancer in 2014 s/p chemotherapy and XRT who then developed ureteral stricture 2/2 XRT s/p bilateral nephrostomy tubes (R nephrostomy tube now removed, L tube remains in place) and transverse colon-urinary conduit in 2020, which was c/b bowel perforation requiring right hemicolectomy and ostomy creation, all of which has been complicated by recurrent MDR infections presenting for worsening fatigue and abdominal pain x 2 weeks. No changes in consistency or frequency of ileostomy/urostomy/nephrostomy output.    - She has had significant ID history, including multiple MDR UTIs ( Enterobacter cloacae 5/30, Klebsiella penumoniae ESBL 5/11, E faecium VRE 4/18, and Klebsiella pneumoniae ESBL 2/14). She has received ertapenem and meropenem in the past. Blood cultures in 2020 also with yeast requiring micafungin.   - On admission, she is HDS and without leukocytosis (WBC 11)  - UA w 3+ leuks but negative nitrites and only occasional bacteria. Not overwhelmingly concerning for  infection, however given her history and imaging findings, will continue with treatment    Plan:  - Initiate ertapenem while awaiting further results  - F/u urine and blood cultures  - F/u ID and urology consults, appreciate assistance  - Given overall hemodynamic stability, concern for sepsis is low. Will defer lactate at this time, however can consider addition to AM labs if indicated    History of suicidal ideation  Pt required PEC/CEC 6/4-6/10 of this year due to SI in setting of MDD and complex bereavement (daughter committed suicide May 2023). This was rescinded when she no longer posed as a threat to herself.   Remeron is listed as an outpatient medication however she states she is not taking it. Denies SI at this time.     - Low threshold to engage psychiatry and/or resume Remeron if indicated      Refusal of blood transfusions as patient is Jainism  Noted. No concerns for blood loss at this time, do not anticipate need for transfusion       Emphysema of lung  Mild diffuse emphysematous changes noted on Chest CT 2020. Has never smoked tobacco. CXR on admission stable.  - Consider addition of albuterol nebs/breathing treatments if indicated          CKD (chronic kidney disease), stage III  Creatinine 3.7 on admit, baseline around 1.1  GFR on admit 13.5, baseline around 50    Plan:   Lab Results   Component Value Date    CREATININE 1.9 (H) 09/11/2023     - See ODESSA  - Avoid nephrotoxic agents such as NSAIDs, gadolinium and IV radiocontrast.  - Renally dose meds to current GFR.  - Maintain MAP > 65.        Nephrostomy status  She is established and follows with outpatient urology. Her nephrostomy tube was last exchanged in June. She was treated with an empiric course of Cefdinir 7/26, which she states she recently completed.  - See pyelitis  - Urology consult --> non urgent replacement of L nephrostomy, IR completed procedure today 9/11/23      QT prolongation  QTc on admission 451 ms.   Documented QTc  "of 605 ms in April 2021.   Cautious utilization of QT prolonging medications.       Presence of urostomy  See pyelitis      History of DVT (deep vein thrombosis)  History of DVT in 2020, likely provoked due to prolonged/recurrent hospitalizations. Previously treated with Eliquis, no longer taking.     - DVT ppx w heparin  - held for IR procedure today 9/11/23, may restart tonight       Ileostomy in place  See pyelitis      Anemia due to chronic kidney disease  Baseline Hgb ~10-11. WNL at 15 on admission  - Daily CBC       ODESSA (acute kidney injury)  Patient with acute kidney injury/acute renal failure. Differential is broad, consider post-renal due to known history of ureteral obstruction. Consider pre-renal/intrarenal in setting of active infection, though she is currently hemodynamically stable and withought leukocytosis, decreasing concern for sepsis/hypoperfusion. Not currently taking any nephrotoxic medications.   ODESSA is currently stable. Baseline creatinine 1.1 - Labs reviewed- Renal function/electrolytes with Estimated Creatinine Clearance: 32.9 mL/min (A) (based on SCr of 1.9 mg/dL (H)). according to latest data. Monitor urine output and serial BMP and adjust therapy as needed. Avoid nephrotoxins and renally dose meds for GFR listed above.    CT AP w continued moderate R sided hydronephrosis w mild wall thickening of R prox ureter; L sided percutaneous nephrostomy tube in place; no L sided hydro    Plan:  - Will defer RP US at this time given visualization of kidneys on CT  - F/u urine lytes  - S/p 500cc bolus in ED, will administer another 500cc  - L nephrostomy replaced today 9/11/23  - Cr improving   - encourage OOB      Seizure-like activity  Per PCP note in April: "Had not seizure in a long time - - Sounds like Medication related   No vascular stenting"  - Noted, carbapenems lower seizure threshold, however at this time benefits of treatment appear to outweigh risks. Has tolerated ertapenem in the past. "       History of cervical cancer  History of cervical cancer w mets to LN, s/p chemo and XRT.  Previously followed with Dr. Lemon in gyn/onc, last visit Dec 2020. Was supposed to RTC in 3 months, however appears to have been lost to follow up.   Recommend referral for continued outpatient maintenance at time of discharge.       History of essential hypertension  History of htn per chart review, does not currently take any outpatient antihypertensives  - Continue to monitor        VTE Risk Mitigation (From admission, onward)         Ordered     IP VTE HIGH RISK PATIENT  Once         09/09/23 2045     Place sequential compression device  Until discontinued         09/09/23 2045                Discharge Planning   OK: 9/13/2023     Code Status: Full Code   Is the patient medically ready for discharge?: No    Reason for patient still in hospital (select all that apply): Patient trending condition and Treatment  Discharge Plan A: Home with family                  Dana Padilla MD  Department of Hospital Medicine   [facility] [address] [facility]

## 2023-09-11 NOTE — CONSULTS
[facility] Surg  Infectious Disease  Consult Note    Patient Name: Edita Riley  MRN: [id number]  Admission Date: 9/9/2023  Hospital Length of Stay: 1 days  Attending Physician: Stephanie Rosa MD  Primary Care Provider: No, Primary Doctor     Isolation Status: No active isolations    Patient information was obtained from patient, past medical records and ER records.      Inpatient consult to Infectious Diseases  Consult performed by: Shahla Miller, APRN, ANP  Consult ordered by: Zee Rivera DO  Reason for consult: Pt w history of bilateral ureteral strictures s/p nephrostomy tubes and colon conduit with multiple MDR UTIs here with concern for pyelitis. Empiric ertapenem ordered        Assessment/Plan:     Renal/  * Pyelitis     58 year old woman with hx of cervical CA c/b bilateral ureteral strictures requiring bilateral nephrostomy tubes (currently left tube remains only; right removed in April ), s/p urinary to colon conduit 2020 c/b bowel perforation s/p right hemicolectomy and ostomy and recurrent MDR infections , QTc prolongation, PTSD, major depression who presented with fatigue and abdominal pain X 2 weeks.  Noted to have ODESSA (creatinine 3.7, baseline 1.1), U/A >100 WBC (unclear source).  CT A/P with right sided hydronephrosis with thickening of the proximal right ureter and concern for pyelitis. Left NT shown to be in good position.  ID consulted for pyelitis.       Complicated urologic history.  Most recently had antegrade nephrostogram and loopogram 6/6/223 which showed reflux on the right  And left distal ureteral stricture.  Left nephrostomy tube last changed on 6/7.  Urology consulted to assess for need for inpatient NT exchange.  Evaluated today and recommends left NT exchange with IR. Reviewed CT and determined right hydronephrosis stable.  No further intervention recommended at this time.      Blood cultures NGTD. Urine culture pending. VSS.  She is currently on IV ertapenem  given hx of MDR UTI.     Plan:  1. Continue IV ertapenem 500 mg q 24 hours (renally dosed)  2. Follow up urine culture and adjust antibiotics accordingly  3. IR tentatively planning left NT replacement tomorrow   4. Will follow up tomorrow.     Data reviewed and plan discussed with ID staff, Dr. Flores  Secure chat/Discussed above plan with Primary Team,       Thank you.   Please Secure Chat for any questions or concerns.  KATIUSKA Perez, ANP-C  Infectious Disease      The total time for evaluation and management services performed on 9/9/23 was greater than 75 minutes, which includes face to face time and non-face to face time preparing to see the patient (eg, review of tests), Obtaining and/or reviewing separately obtained history, documenting clinical information in the electronic or other health record, independently interpreting results (not separately reported) and communicating results to the patient/family/caregiver, or care coordination (not separately reported).    Subjective:     Principal Problem: Pyelitis    HPI: Edita Riley is a 58 year old woman with hx of cervical CA c/b bilateral ureteral strictures requiring bilateral nephrostomy tubes (currently left tube remains only), s/p urinary to colon conduit 2020 c/b bowel perforation s/p right hemicolectomy and ostomy, recurrent MDR infections , QTc prolongation, PTSD, major depression who presented for worsening fatigue and abdominal pain.  In ED afebrile, WBC 11.  Noted to have ODESSA (creatinine 3.7, baseline 1.1), U/A >100 WBC (unclear source).  CT A/P with right sided hydronephrosis with thickening of the proximal right ureter and concern for pyelitis.  ID consulted for pyelitis.      Blood cultures NGTD. Urine culture pending. VSS.  She is currently on IV ertapenem.       Past Medical History:   Diagnosis Date    Abnormal mammogram 08/25/2020    Acute blood loss anemia     Acute deep vein thrombosis (DVT) of lower extremity 12/09/2020     Advance care planning 04/30/2021    Anemia due to chronic blood loss     Anemia due to chronic kidney disease     Anxiety     Bilateral ureteral obstruction 9/11/2020    Cardiovascular event risk -- low 09/14/2015    ASCVD 10-year risk 1.9% (with optimal risk factors 1.3%) as of 9/14/2015     Cervical cancer 2014    Chronic back pain     Colostomy care     Deep vein thrombosis     Depression     Diarrhea due to malabsorption 11/14/2018    Difficult intubation     Disorder of kidney and ureter     DVT of lower extremity, bilateral 11/04/2020    Emphysema of lung 4/10/2023    Fibromyalgia     Fungemia 09/27/2020    Generalized abdominal pain 08/25/2020    GERD (gastroesophageal reflux disease)     Hemifacial spasm 09/16/2015    Hiatal hernia 2014    History of cervical cancer 10/11/2018    Hx of psychiatric care     Cymbalta, trazodone    Hypertension     Hypomagnesemia 11/21/2018    Lactose intolerance     Metastatic squamous cell carcinoma to lymph node 10/11/2018    Neuropathy due to chemotherapeutic drug 11/14/2018    Osteoarthritis of back     Peritonitis 09/22/2020    Pseudomonas urinary tract infection 04/21/2021    Psychiatric problem     Refusal of blood transfusions as patient is Alevism     Schatzki's ring 09/14/2015    Seen on outside EGD 05/2014, underwent esophageal dilatation. Bx were negative.     Seizures     Sleep stage dysfunction     Noted on PSG 06/2017; negative for obstructive sleep apnea     Stroke     Urinary tract infection associated with nephrostomy catheter 06/11/2020    Wound infection after surgery 09/24/2020       Past Surgical History:   Procedure Laterality Date    ANTEGRADE NEPHROSTOGRAPHY Bilateral 9/28/2020    Procedure: Nephrostogram - antegrade;  Surgeon: Leslie Balbuena MD;  Location: [facility] OR [address] [place] [address];  Service: Urology;  Laterality: Bilateral;    ANTEGRADE NEPHROSTOGRAPHY Left 4/20/2021    Procedure: NEPHROSTOGRAM, ANTEGRADE;   Surgeon: Leslie Balbuena MD;  Location: [facility] OR 1ST FLR;  Service: Urology;  Laterality: Left;    ANTEGRADE NEPHROSTOGRAPHY Right 10/27/2022    Procedure: NEPHROSTOGRAM, ANTEGRADE;  Surgeon: Chirag Russ MD;  Location: [facility] OR 1ST FLR;  Service: Urology;  Laterality: Right;    ANTEGRADE NEPHROSTOGRAPHY Right 4/21/2023    Procedure: NEPHROSTOGRAM, ANTEGRADE;  Surgeon: Chirag Russ MD;  Location: [facility] OR 2ND FLR;  Service: Urology;  Laterality: Right;    ANTEGRADE NEPHROSTOGRAPHY Left 6/6/2023    Procedure: Nephrostogram - antegrade;  Surgeon: Leslie Balbuena MD;  Location: [facility] OR 1ST FLR;  Service: Urology;  Laterality: Left;    BILATERAL OOPHORECTOMY  2015    CHOLECYSTECTOMY  11/09/2016    Done at [facility], path showed chronic cholecystitis and gallstones    cold knife conization  2014    COLECTOMY, RIGHT  9/17/2020    Procedure: COLECTOMY, RIGHT Extended;  Surgeon: Hammad Reynolds MD;  Location: [facility] OR 2ND FLR;  Service: Colon and Rectal;;    COLONOSCOPY  2014    [facility]    COLONOSCOPY N/A 5/18/2018    Procedure: COLONOSCOPY;  Surgeon: Arden Gutiérrez MD;  Location: [facility] ENDO (4TH FLR);  Service: Endoscopy;  Laterality: N/A;    COLONOSCOPY N/A 7/28/2020    Procedure: COLONOSCOPY;  Surgeon: Hammad Reynolds MD;  Location: [facility] ENDO (4TH FLR);  Service: Colon and Rectal;  Laterality: N/A;  [place] 7[place]    CYSTOSCOPY WITH URETEROSCOPY, RETROGRADE PYELOGRAPHY, AND INSERTION OF STENT Bilateral 3/21/2020    Procedure: CYSTOSCOPY, WITH RETROGRADE PYELOGRAM,;  Surgeon: Leslie Balbuena MD;  Location: [facility] OR 1ST FLR;  Service: Urology;  Laterality: Bilateral;    DILATION OF NEPHROSTOMY TRACT Right 10/27/2022    Procedure: DILATION, NEPHROSTOMY TRACT;  Surgeon: Chirag Russ MD;  Location: [facility] OR 1ST FLR;  Service: Urology;  Laterality: Right;    ESOPHAGOGASTRODUODENOSCOPY  2014    ESOPHAGOGASTRODUODENOSCOPY  11/18/2020     ESOPHAGOGASTRODUODENOSCOPY N/A 11/18/2020    Procedure: ESOPHAGOGASTRODUODENOSCOPY (EGD);  Surgeon: Zenon Spencer MD;  Location: [facility] ENDO;  Service: Endoscopy;  Laterality: N/A;    ESOPHAGOGASTRODUODENOSCOPY N/A 12/11/2020    Procedure: EGD (ESOPHAGOGASTRODUODENOSCOPY);  Surgeon: Juancho Muse MD;  Location: [facility] (2ND FLR);  Service: Endoscopy;  Laterality: N/A;    HYSTERECTOMY  2014    [facility] for cervical [facility]    ILEOSTOMY  9/17/2020    Procedure: CREATION, ILEOSTOMY;  Surgeon: Hammad Reynolds MD;  Location: [facility] OR 2ND FLR;  Service: Colon and Rectal;;    LOOPOGRAM N/A 4/20/2021    Procedure: LOOPOGRAM;  Surgeon: Leslie Balbuena MD;  Location: [facility] OR 1ST FLR;  Service: Urology;  Laterality: N/A;    LOOPOGRAM N/A 6/6/2023    Procedure: LOOPOGRAM;  Surgeon: Leslie Balbuena MD;  [facility];  Service: Urology;  Laterality: N/A;    MOBILIZATION OF SPLENIC FLEXURE N/A 9/11/2020    Procedure: MOBILIZATION, COLONIC;  Surgeon: Hammad Reynolds MD;  Location: [facility] OR 2ND FLR;  Service: Colon and Rectal;  Laterality: N/A;    NEPHROSTOGRAPHY Bilateral 4/17/2021    Procedure: Nephrostogram;  Surgeon: Celeste Surgeon;  Location: [facility];  Service: Anesthesiology;  Laterality: Bilateral;    OOPHORECTOMY      PERCUTANEOUS NEPHROLITHOTOMY Right 4/21/2023    Procedure: NEPHROLITHOTOMY, PERCUTANEOUS;  Surgeon: Chirag Russ MD;  Location: [facility] OR 2ND FLR;  Service: Urology;  Laterality: Right;  2.5 hrs    PERCUTANEOUS NEPHROSTOMY  4/21/2023    Procedure: CREATION, NEPHROSTOMY, PERCUTANEOUS with removal of existing nephrostomy tube;  Surgeon: Chirag Russ MD;  Location: [facility] OR 2ND FLR;  Service: Urology;;    PYELOSCOPY Right 10/27/2022    Procedure: PYELOSCOPY;  Surgeon: Chirag Russ MD;  Location: [facility] OR 1ST FLR;  Service: Urology;  Laterality: Right;    REPLACEMENT OF NEPHROSTOMY TUBE Right 10/27/2022    Procedure: REPLACEMENT, NEPHROSTOMY TUBE;  Surgeon: Chirag MICHELLE  MD Jeb;  Location: [facility] OR 1ST FLR;  Service: Urology;  Laterality: Right;    RETROPERITONEAL LYMPHADENECTOMY Right 9/17/2018    Procedure: LYMPHADENECTOMY, RETROPERITONEUM;  Surgeon: Sebas Reed MD;  Location: [facility] OR 2ND FLR;  Service: General;  Laterality: Right;  ILIAC    URETEROSCOPIC REMOVAL OF URETERIC CALCULUS Right 10/27/2022    Procedure: REMOVAL, CALCULUS, URETER, URETEROSCOPIC;  Surgeon: Chirag Russ MD;  Location: [facility] OR 1ST FLR;  Service: Urology;  Laterality: Right;    URETEROSCOPY Right 10/27/2022    Procedure: URETEROSCOPY-ANTEGRADE;  Surgeon: Chirag Russ MD;  Location: [facility] OR 1ST FLR;  Service: Urology;  Laterality: Right;       Review of patient's allergies indicates:   Allergen Reactions    Bee sting [allergen ext-venom-honey bee]      Rash      Grass pollen-bermuda, standard      rash       Medications:  Medications Prior to Admission   Medication Sig    gabapentin (NEURONTIN) 100 MG capsule Take 2 capsules (200 mg total) by mouth every evening.    mirtazapine (REMERON) 30 MG tablet Take 1 tablet (30 mg total) by mouth nightly.    oxyCODONE (ROXICODONE) 5 MG immediate release tablet Take 1 tablet (5 mg total) by mouth every 12 (twelve) hours as needed for Pain.    tolterodine (DETROL LA) 2 MG Cp24 Take 2 mg by mouth once daily.     Antibiotics (From admission, onward)      Start     Stop Route Frequency Ordered    09/10/23 0000  ertapenem (INVANZ) 500 mg in sodium chloride 0.9% 100 mL IVPB         -- IV Every 24 hours (non-standard times) 09/09/23 2119          Antifungals (From admission, onward)      None          Antivirals (From admission, onward)      None             Immunization History   Administered Date(s) Administered    Influenza - Quadrivalent - PF *Preferred* (6 months and older) 11/16/2017, 09/19/2019    Tdap 03/28/2017       Family History       Problem Relation (Age of Onset)    Breast cancer Maternal Aunt    Cancer Father, Mother    Cervical  cancer Mother    Colon cancer Father    Drug abuse Daughter, Daughter    Esophageal cancer Father    Heart disease Sister, Maternal Grandmother    Suicide Daughter          Social History     Socioeconomic History    Marital status:      Spouse name: Hammad    Number of children: 2   Tobacco Use    Smoking status: Never    Smokeless tobacco: Never   Substance and Sexual Activity    Alcohol use: No     Alcohol/week: 0.0 standard drinks of alcohol    Drug use: No    Sexual activity: Yes     Partners: Male     Birth control/protection: None     Comment:  19 years since    Social History Narrative    , twin daughters (1  2018), disabled due to childhood stroke, [employer]     Social Determinants of Health     Financial Resource Strain: Low Risk  (2023)    Overall Financial Resource Strain (CARDIA)     Difficulty of Paying Living Expenses: Not very hard   Food Insecurity: No Food Insecurity (2023)    Hunger Vital Sign     Worried About Running Out of Food in the Last Year: Never true     Ran Out of Food in the Last Year: Never true   Transportation Needs: No Transportation Needs (2023)    PRAPARE - Transportation     Lack of Transportation (Medical): No     Lack of Transportation (Non-Medical): No   Physical Activity: Insufficiently Active (2023)    Exercise Vital Sign     Days of Exercise per Week: 2 days     Minutes of Exercise per Session: 30 min   Stress: Stress Concern Present (2023)    [place] [employer] - Occupational Stress Questionnaire     Feeling of Stress : To some extent   Social Connections: Moderately Isolated (2023)    Social Connection and Isolation Panel [NHANES]     Frequency of Communication with Friends and Family: Once a week     Frequency of Social Gatherings with Friends and Family: Three times a week     Attends Islam Services: Never     Active Member of Clubs or  Organizations: No     Attends Club or Organization Meetings: Never     Marital Status:    Housing Stability: Low Risk  (8/19/2023)    Housing Stability Vital Sign     Unable to Pay for Housing in the Last Year: No     Number of Places Lived in the Last Year: 2     Unstable Housing in the Last Year: No     Review of Systems   Constitutional:  Positive for activity change, appetite change, fatigue and fever. Negative for chills.   HENT: Negative.     Eyes: Negative.    Respiratory:  Negative for shortness of breath and wheezing.    Cardiovascular:  Negative for chest pain and leg swelling.   Gastrointestinal:  Positive for abdominal pain, nausea and vomiting. Negative for abdominal distention, blood in stool, constipation and diarrhea.   Genitourinary:  Negative for decreased urine volume, difficulty urinating, hematuria and pelvic pain.        Left nephrostomy tube      Musculoskeletal:  Negative for back pain.   Skin:  Negative for wound.   Neurological:  Negative for dizziness, syncope and headaches.   Psychiatric/Behavioral:  Negative for agitation. The patient is not nervous/anxious.      Objective:     Vital Signs (Most Recent):  Temp: 97.7 °F (36.5 °C) (09/10/23 1004)  Pulse: 81 (09/10/23 1638)  Resp: 18 (09/10/23 1638)  BP: (!) 134/90 (09/10/23 1638)  SpO2: 97 % (09/10/23 1638) Vital Signs (24h Range):  Temp:  [97.7 °F (36.5 °C)-98.9 °F (37.2 °C)] 97.7 °F (36.5 °C)  Pulse:  [77-98] 81  Resp:  [15-18] 18  SpO2:  [97 %-100 %] 97 %  BP: (100-134)/(58-90) 134/90     Weight: 70.8 kg (156 lb)  Body mass index is 23.04 kg/m².    Estimated Creatinine Clearance: 22.3 mL/min (A) (based on SCr of 2.8 mg/dL (H)).     Physical Exam  Vitals and nursing note reviewed.   Constitutional:       General: She is not in acute distress.     Appearance: She is ill-appearing (chronically ill appearing). She is not toxic-appearing or diaphoretic.   HENT:      Head: Normocephalic and atraumatic.      Mouth/Throat:       Mouth: Mucous membranes are moist.   Eyes:      General: No scleral icterus.     Conjunctiva/sclera: Conjunctivae normal.   Cardiovascular:      Rate and Rhythm: Normal rate and regular rhythm.      Heart sounds: No murmur heard.  Pulmonary:      Effort: Pulmonary effort is normal. No respiratory distress.      Breath sounds: Normal breath sounds.   Abdominal:      General: There is no distension.      Palpations: Abdomen is soft.      Tenderness: There is abdominal tenderness. There is right CVA tenderness. There is no left CVA tenderness.      Comments: Ileostomy in place   Urostomy - yellow urine         Genitourinary:     Comments: Left Nephrostomy in place - clear urine  Musculoskeletal:      Right lower leg: No edema.      Left lower leg: No edema.   Skin:     General: Skin is warm and dry.   Neurological:      Mental Status: She is alert and oriented to person, place, and time.   Psychiatric:         Mood and Affect: Mood normal.         Behavior: Behavior normal.          Significant Labs: Blood Culture:   Recent Labs   Lab 05/30/23  1845 05/30/23  1900 08/18/23  2043 09/09/23 1806 09/09/23 1807   LABBLOO No growth after 5 days. No growth after 5 days. No growth after 5 days.  No growth after 5 days. No Growth to date No Growth to date     CBC:   Recent Labs   Lab 09/09/23  1359 09/10/23  0438   WBC 11.03 11.91   HGB 15.9 14.6   HCT 52.6* 46.3    309     CMP:   Recent Labs   Lab 09/09/23  1359 09/10/23  0438   * 140   K 4.3 4.8    111*   CO2 10* 12*   * 93   BUN 66* 66*   CREATININE 3.7* 2.8*   CALCIUM 10.2 9.6   PROT 10.0* 9.3*   ALBUMIN 3.9 3.4*   BILITOT 0.5 0.3   ALKPHOS 132 118   AST 21 26   ALT 36 27   ANIONGAP 20* 17*     Microbiology Results (last 7 days)       Procedure Component Value Units Date/Time    Blood Culture #1 **CANNOT BE ORDERED STAT** [[id number]] Collected: 09/09/23 1807    Order Status: Completed Specimen: Blood from Peripheral, Hand, Right Updated:  09/10/23 0115     Blood Culture, Routine No Growth to date    Blood Culture #2 **CANNOT BE ORDERED STAT** [[id number]] Collected: 09/09/23 1806    Order Status: Completed Specimen: Blood from Peripheral, Hand, Left Updated: 09/10/23 0115     Blood Culture, Routine No Growth to date    Urine culture [[id number]] Collected: 09/09/23 1416    Order Status: No result Specimen: Urine Updated: 09/09/23 1510          Urine Culture:   Recent Labs   Lab 04/18/23  0629 05/11/23  2037 05/30/23 2107 07/26/23  1614 08/18/23  2352   LABURIN ENTEROCOCCUS FAECIUM VRE  50,000 - 99,999 cfu/ml  * KLEBSIELLA PNEUMONIAE ESBL  >100,000 cfu/ml  * ENTEROBACTER CLOACAE  > 100,000 cfu/ml  * Multiple organisms isolated. None in predominance.  Repeat if  clinically necessary. Multiple organisms isolated. None in predominance.  Repeat if  clinically necessary.     Urine Studies:   Recent Labs   Lab 05/19/23 2243 05/30/23 2107 09/09/23 1416   COLORU Yellow   < > Yellow   APPEARANCEUA Hazy*   < > Ex.Turbid   PHUR 7.0   < > 7.0   SPECGRAV 1.010   < > 1.015   PROTEINUA 1+*   < > 2+*   GLUCUA Negative   < > Negative   KETONESU Trace*   < > Negative   BILIRUBINUA Negative   < > Negative   OCCULTUA Negative   < > 1+*   NITRITE Negative   < > Negative   LEUKOCYTESUR 1+*   < > 3+*   RBCUA 3   < > 8*   WBCUA 7*   < > >100*   BACTERIA Many*   < > Occasional   SQUAMEPITHEL 1  --   --    HYALINECASTS 0   < > 0    < > = values in this interval not displayed.       Significant Imaging: I have reviewed all pertinent imaging results/findings within the past 24 hours.

## 2023-09-11 NOTE — PLAN OF CARE
APPOINTMENT:    Patient Appointment(s) scheduled with Trina Vu MD, on Monday Sep 18, 2023 9:00 AM

## 2023-09-11 NOTE — NURSING
Pt is back to the floor from IR. Pt is A,A<O x 4 . Stable V/S . No C/O pain. MD order a regular diet. Nephrostomy tube site is covered with dressing , clean , dry and intact. No bleeding. Will continue to monitor.

## 2023-09-11 NOTE — PROCEDURES
VIR Post-Procedure Note    Pre Op Diagnosis:   Urinary obstruction with hydronephrosis (hx of cervical cancer)    Post Op Diagnosis:  same    Procedure:left percutaneous nephrostomy tube change    Procedure performed by: Blanche Villasenor MD     Written Informed Consent Obtained:  Yes    Specimen Removed: No     Estimated Blood Loss:  Minimal     Findings:     Local anesthesia and moderate sedation were used.      Left nephrostogram showed complete mid ureteral obstruction    Successful exchange of  12 Fr left nephrostomy tube     Plan:    Routine exchange with VIR unless another intervention performed earlier.           Blanche Villasenor MD  VIR Fellow

## 2023-09-11 NOTE — SUBJECTIVE & OBJECTIVE
Past Medical History:   Diagnosis Date    Abnormal mammogram 08/25/2020    Acute blood loss anemia     Acute deep vein thrombosis (DVT) of lower extremity 12/09/2020    Advance care planning 04/30/2021    Anemia due to chronic blood loss     Anemia due to chronic kidney disease     Anxiety     Bilateral ureteral obstruction 9/11/2020    Cardiovascular event risk -- low 09/14/2015    ASCVD 10-year risk 1.9% (with optimal risk factors 1.3%) as of 9/14/2015     Cervical cancer 2014    Chronic back pain     Colostomy care     Deep vein thrombosis     Depression     Diarrhea due to malabsorption 11/14/2018    Difficult intubation     Disorder of kidney and ureter     DVT of lower extremity, bilateral 11/04/2020    Emphysema of lung 4/10/2023    Fibromyalgia     Fungemia 09/27/2020    Generalized abdominal pain 08/25/2020    GERD (gastroesophageal reflux disease)     Hemifacial spasm 09/16/2015    Hiatal hernia 2014    History of cervical cancer 10/11/2018    Hx of psychiatric care     Cymbalta, trazodone    Hypertension     Hypomagnesemia 11/21/2018    Lactose intolerance     Metastatic squamous cell carcinoma to lymph node 10/11/2018    Neuropathy due to chemotherapeutic drug 11/14/2018    Osteoarthritis of back     Peritonitis 09/22/2020    Pseudomonas urinary tract infection 04/21/2021    Psychiatric problem     Refusal of blood transfusions as patient is [employer]     Schatzki's ring 09/14/2015    Seen on outside EGD 05/2014, underwent esophageal dilatation. Bx were negative.     Seizures     Sleep stage dysfunction     Noted on PSG 06/2017; negative for obstructive sleep apnea     Stroke     Urinary tract infection associated with nephrostomy catheter 06/11/2020    Wound infection after surgery 09/24/2020       Past Surgical History:   Procedure Laterality Date    ANTEGRADE NEPHROSTOGRAPHY Bilateral 9/28/2020    Procedure: Nephrostogram - antegrade;  Surgeon: Leslie Balbuena MD;  Location: [facility] OR [address] [place] [address];   Service: Urology;  Laterality: Bilateral;    ANTEGRADE NEPHROSTOGRAPHY Left 4/20/2021    Procedure: NEPHROSTOGRAM, ANTEGRADE;  Surgeon: Leslie Balbuena MD;  Location: [facility] OR 1ST FLR;  Service: Urology;  Laterality: Left;    ANTEGRADE NEPHROSTOGRAPHY Right 10/27/2022    Procedure: NEPHROSTOGRAM, ANTEGRADE;  Surgeon: Chirag Russ MD;  Location: [facility] OR 1ST FLR;  Service: Urology;  Laterality: Right;    ANTEGRADE NEPHROSTOGRAPHY Right 4/21/2023    Procedure: NEPHROSTOGRAM, ANTEGRADE;  Surgeon: Chirag Russ MD;  Location: [facility] OR 2ND FLR;  Service: Urology;  Laterality: Right;    ANTEGRADE NEPHROSTOGRAPHY Left 6/6/2023    Procedure: Nephrostogram - antegrade;  Surgeon: Leslie Balbuena MD;  Location: [facility] OR 1ST FLR;  Service: Urology;  Laterality: Left;    BILATERAL OOPHORECTOMY  2015    CHOLECYSTECTOMY  11/09/2016    Done at [facility], path showed chronic cholecystitis and gallstones    cold knife conization  2014    COLECTOMY, RIGHT  9/17/2020    Procedure: COLECTOMY, RIGHT Extended;  Surgeon: Hammad Reynolds MD;  Location: [facility] OR 2ND FLR;  Service: Colon and Rectal;;    COLONOSCOPY  2014    [facility]OSCOPY N[facility]A [facility]/24/2016    [facility]    COLONOSCOPY N/A 5/18/2018    Procedure: COLONOSCOPY;  Surgeon: Arden Gutiérrez MD;  Location: [facility] (4TH FLR);  Service: Endoscopy;  Laterality: N/A;    COLONOSCOPY N/A 7/28/2020    Procedure: COLONOSCOPY;  Surgeon: Hammad Reynolds MD;  Location: [facility] ENDO (4TH FLR);  Service: Colon and Rectal;  Laterality: N/A;  covid test [place] 7/25    CYSTOSCOPY WITH URETEROSCOPY, RETROGRADE PYELOGRAPHY, AND INSERTION OF STENT Bilateral 3/21/2020    Procedure: CYSTOSCOPY, WITH RETROGRADE PYELOGRAM,;  Surgeon: Leslie Balbuena MD;  Location: [facility] OR 1ST FLR;  Service: Urology;  Laterality: Bilateral;    DILATION OF NEPHROSTOMY TRACT Right 10/27/2022    Procedure: DILATION, NEPHROSTOMY TRACT;  Surgeon: Chirag Russ MD;  Location: [facility] OR 1ST FLR;  Service:  Urology;  Laterality: Right;    ESOPHAGOGASTRODUODENOSCOPY  2014    ESOPHAGOGASTRODUODENOSCOPY  11/18/2020    ESOPHAGOGASTRODUODENOSCOPY N/A 11/18/2020    Procedure: ESOPHAGOGASTRODUODENOSCOPY (EGD);  Surgeon: Zenon Spencer MD;  Location: [facility] ENDO;  Service: Endoscopy;  Laterality: N/A;    ESOPHAGOGASTRODUODENOSCOPY N/A 12/11/2020    Procedure: EGD (ESOPHAGOGASTRODUODENOSCOPY);  Surgeon: Juancho Muse MD;  Location: [facility] (2ND FLR);  Service: Endoscopy;  Laterality: N/A;    HYSTERECTOMY  2014    [facility] for cervical cancer    ILEOSTOMY  9/17/2020    Procedure: CREATION, ILEOSTOMY;  Surgeon: Hammad Reynolds MD;  Location: [facility] OR 2ND FLR;  Service: Colon and Rectal;;    LOOPOGRAM N/A 4/20/2021    Procedure: LOOPOGRAM;  Surgeon: Leslie Balbuena MD;  Location: [facility] OR 1ST FLR;  Service: Urology;  Laterality: N/A;    LOOPOGRAM N/A 6/6/2023    Procedure: LOOPOGRAM;  Surgeon: Leslie Balbuena MD;  Location: [facility] OR 1ST FLR;  Service: Urology;  Laterality: N/A;    MOBILIZATION OF SPLENIC FLEXURE N/A 9/11/2020    Procedure: MOBILIZATION, COLONIC;  Surgeon: Hammad Reynolds MD;  Location: [facility] OR 2ND FLR;  Service: Colon and Rectal;  Laterality: N/A;    NEPHROSTOGRAPHY Bilateral 4/17/2021    Procedure: Nephrostogram;  Surgeon: Celeste Surgeon;  Location: [facility];  Service: Anesthesiology;  Laterality: Bilateral;    OOPHORECTOMY      PERCUTANEOUS NEPHROLITHOTOMY Right 4/21/2023    Procedure: NEPHROLITHOTOMY, PERCUTANEOUS;  Surgeon: Chirag Russ MD;  Location: [facility] OR 2ND FLR;  Service: Urology;  Laterality: Right;  2.5 hrs    PERCUTANEOUS NEPHROSTOMY  4/21/2023    Procedure: CREATION, NEPHROSTOMY, PERCUTANEOUS with removal of existing nephrostomy tube;  Surgeon: Chirag Russ MD;  Location: [facility] OR 2ND FLR;  Service: Urology;;    PYELOSCOPY Right 10/27/2022    Procedure: PYELOSCOPY;  Surgeon: Chirag Russ MD;  Location: [facility] OR [address] [place] [address];  Service: Urology;  Laterality: Right;    REPLACEMENT OF  NEPHROSTOMY TUBE Right 10/27/2022    Procedure: REPLACEMENT, NEPHROSTOMY TUBE;  Surgeon: Chirag Russ MD;  Location: [facility] OR 1ST FLR;  Service: Urology;  Laterality: Right;    RETROPERITONEAL LYMPHADENECTOMY Right 9/17/2018    Procedure: LYMPHADENECTOMY, RETROPERITONEUM;  Surgeon: Sebas Reed MD;  Location: [facility] OR 2ND FLR;  Service: General;  Laterality: Right;  ILIAC    URETEROSCOPIC REMOVAL OF URETERIC CALCULUS Right 10/27/2022    Procedure: REMOVAL, CALCULUS, URETER, URETEROSCOPIC;  Surgeon: Chirag Russ MD;  Location: [facility] OR 1ST FLR;  Service: Urology;  Laterality: Right;    URETEROSCOPY Right 10/27/2022    Procedure: URETEROSCOPY-ANTEGRADE;  Surgeon: Chirag Russ MD;  Location: [facility] OR 1ST FLR;  Service: Urology;  Laterality: Right;       Review of patient's allergies indicates:   Allergen Reactions    Bee sting [allergen ext-venom-honey bee]      Rash      Grass pollen-bermuda, standard      rash       Medications:  Medications Prior to Admission   Medication Sig    gabapentin (NEURONTIN) 100 MG capsule Take 2 capsules (200 mg total) by mouth every evening.    mirtazapine (REMERON) 30 MG tablet Take 1 tablet (30 mg total) by mouth nightly.    oxyCODONE (ROXICODONE) 5 MG immediate release tablet Take 1 tablet (5 mg total) by mouth every 12 (twelve) hours as needed for Pain.    tolterodine (DETROL LA) 2 MG Cp24 Take 2 mg by mouth once daily.     Antibiotics (From admission, onward)      Start     Stop Route Frequency Ordered    09/10/23 0000  ertapenem (INVANZ) 500 mg in sodium chloride 0.9% 100 mL IVPB         -- IV Every 24 hours (non-standard times) 09/09/23 2119          Antifungals (From admission, onward)      None          Antivirals (From admission, onward)      None             Immunization History   Administered Date(s) Administered    Influenza - Quadrivalent - PF *Preferred* (6 months and older) 11/16/2017, 09/19/2019    Tdap 03/28/2017       Family History       Problem  Relation (Age of Onset)    Breast cancer Maternal Aunt    Cancer Father, Mother    Cervical cancer Mother    Colon cancer Father    Drug abuse Daughter, Daughter    Esophageal cancer Father    Heart disease Sister, Maternal Grandmother    Suicide Daughter          Social History     Socioeconomic History    Marital status:      Spouse name: Hammad    Number of children: 2   Tobacco Use    Smoking status: Never    Smokeless tobacco: Never   Substance and Sexual Activity    Alcohol use: No     Alcohol/week: 0.0 standard drinks of alcohol    Drug use: No    Sexual activity: Yes     Partners: Male     Birth control/protection: None     Comment:  19 years since    Social History Narrative    , twin daughters (1  2018), disabled due to childhood stroke, Hinduism sophia     Social Determinants of Health     Financial Resource Strain: Low Risk  (2023)    Overall Financial Resource Strain (CARDIA)     Difficulty of Paying Living Expenses: Not very hard   Food Insecurity: No Food Insecurity (2023)    Hunger Vital Sign     Worried About Running Out of Food in the Last Year: Never true     Ran Out of Food in the Last Year: Never true   Transportation Needs: No Transportation Needs (2023)    PRAPARE - Transportation     Lack of Transportation (Medical): No     Lack of Transportation (Non-Medical): No   Physical Activity: Insufficiently Active (2023)    Exercise Vital Sign     Days of Exercise per Week: 2 days     Minutes of Exercise per Session: 30 min   Stress: Stress Concern Present (2023)    [place] [employer] - Occupational Stress Questionnaire     Feeling of Stress : To some extent   Social Connections: Moderately Isolated (2023)    Social Connection and Isolation Panel [NHANES]     Frequency of Communication with Friends and Family: Once a week     Frequency of Social Gatherings with Friends and Family: Three times a week      Attends Anabaptist Services: Never     Active Member of Clubs or Organizations: No     Attends Club or Organization Meetings: Never     Marital Status:    Housing Stability: Low Risk  (8/19/2023)    Housing Stability Vital Sign     Unable to Pay for Housing in the Last Year: No     Number of Places Lived in the Last Year: 2     Unstable Housing in the Last Year: No     Review of Systems   Constitutional:  Positive for activity change, appetite change, fatigue and fever. Negative for chills.   HENT: Negative.     Eyes: Negative.    Respiratory:  Negative for shortness of breath and wheezing.    Cardiovascular:  Negative for chest pain and leg swelling.   Gastrointestinal:  Positive for abdominal pain, nausea and vomiting. Negative for abdominal distention, blood in stool, constipation and diarrhea.   Genitourinary:  Negative for decreased urine volume, difficulty urinating, hematuria and pelvic pain.        Left nephrostomy tube      Musculoskeletal:  Negative for back pain.   Skin:  Negative for wound.   Neurological:  Negative for dizziness, syncope and headaches.   Psychiatric/Behavioral:  Negative for agitation. The patient is not nervous/anxious.      Objective:     Vital Signs (Most Recent):  Temp: 97.7 °F (36.5 °C) (09/10/23 1004)  Pulse: 81 (09/10/23 1638)  Resp: 18 (09/10/23 1638)  BP: (!) 134/90 (09/10/23 1638)  SpO2: 97 % (09/10/23 1638) Vital Signs (24h Range):  Temp:  [97.7 °F (36.5 °C)-98.9 °F (37.2 °C)] 97.7 °F (36.5 °C)  Pulse:  [77-98] 81  Resp:  [15-18] 18  SpO2:  [97 %-100 %] 97 %  BP: (100-134)/(58-90) 134/90     Weight: 70.8 kg (156 lb)  Body mass index is 23.04 kg/m².    Estimated Creatinine Clearance: 22.3 mL/min (A) (based on SCr of 2.8 mg/dL (H)).     Physical Exam  Vitals and nursing note reviewed.   Constitutional:       General: She is not in acute distress.     Appearance: She is ill-appearing (chronically ill appearing). She is not toxic-appearing or diaphoretic.   HENT:      Head:  Normocephalic and atraumatic.      Mouth/Throat:      Mouth: Mucous membranes are moist.   Eyes:      General: No scleral icterus.     Conjunctiva/sclera: Conjunctivae normal.   Cardiovascular:      Rate and Rhythm: Normal rate and regular rhythm.      Heart sounds: No murmur heard.  Pulmonary:      Effort: Pulmonary effort is normal. No respiratory distress.      Breath sounds: Normal breath sounds.   Abdominal:      General: There is no distension.      Palpations: Abdomen is soft.      Tenderness: There is abdominal tenderness. There is right CVA tenderness. There is no left CVA tenderness.      Comments: Ileostomy in place   Urostomy - yellow urine         Genitourinary:     Comments: Left Nephrostomy in place - clear urine  Musculoskeletal:      Right lower leg: No edema.      Left lower leg: No edema.   Skin:     General: Skin is warm and dry.   Neurological:      Mental Status: She is alert and oriented to person, place, and time.   Psychiatric:         Mood and Affect: Mood normal.         Behavior: Behavior normal.          Significant Labs: Blood Culture:   Recent Labs   Lab 05/30/23  1845 05/30/23  1900 08/18/23  2043 09/09/23  1806 09/09/23  1807   LABBLOO No growth after 5 days. No growth after 5 days. No growth after 5 days.  No growth after 5 days. No Growth to date No Growth to date     CBC:   Recent Labs   Lab 09/09/23  1359 09/10/23  0438   WBC 11.03 11.91   HGB 15.9 14.6   HCT 52.6* 46.3    309     CMP:   Recent Labs   Lab 09/09/23  1359 09/10/23  0438   * 140   K 4.3 4.8    111*   CO2 10* 12*   * 93   BUN 66* 66*   CREATININE 3.7* 2.8*   CALCIUM 10.2 9.6   PROT 10.0* 9.3*   ALBUMIN 3.9 3.4*   BILITOT 0.5 0.3   ALKPHOS 132 118   AST 21 26   ALT 36 27   ANIONGAP 20* 17*     Microbiology Results (last 7 days)       Procedure Component Value Units Date/Time    Blood Culture #1 **CANNOT BE ORDERED STAT** [[id number]] Collected: 09/09/23 1807    Order Status: Completed  Specimen: Blood from Peripheral, Hand, Right Updated: 09/10/23 0115     Blood Culture, Routine No Growth to date    Blood Culture #2 **CANNOT BE ORDERED STAT** [[id number]] Collected: 09/09/23 1806    Order Status: Completed Specimen: Blood from Peripheral, Hand, Left Updated: 09/10/23 0115     Blood Culture, Routine No Growth to date    Urine culture [[id number]] Collected: 09/09/23 1416    Order Status: No result Specimen: Urine Updated: 09/09/23 1510          Urine Culture:   Recent Labs   Lab 04/18/23  0629 05/11/23  2037 05/30/23 2107 07/26/23  1614 08/18/23  2352   LABURIN ENTEROCOCCUS FAECIUM VRE  50,000 - 99,999 cfu/ml  * KLEBSIELLA PNEUMONIAE ESBL  >100,000 cfu/ml  * ENTEROBACTER CLOACAE  > 100,000 cfu/ml  * Multiple organisms isolated. None in predominance.  Repeat if  clinically necessary. Multiple organisms isolated. None in predominance.  Repeat if  clinically necessary.     Urine Studies:   Recent Labs   Lab 05/19/23 2243 05/30/23 2107 09/09/23 1416   COLORU Yellow   < > Yellow   APPEARANCEUA Hazy*   < > Ex.Turbid   PHUR 7.0   < > 7.0   SPECGRAV 1.010   < > 1.015   PROTEINUA 1+*   < > 2+*   GLUCUA Negative   < > Negative   KETONESU Trace*   < > Negative   BILIRUBINUA Negative   < > Negative   OCCULTUA Negative   < > 1+*   NITRITE Negative   < > Negative   LEUKOCYTESUR 1+*   < > 3+*   RBCUA 3   < > 8*   WBCUA 7*   < > >100*   BACTERIA Many*   < > Occasional   SQUAMEPITHEL 1  --   --    HYALINECASTS 0   < > 0    < > = values in this interval not displayed.       Significant Imaging: I have reviewed all pertinent imaging results/findings within the past 24 hours.

## 2023-09-11 NOTE — SUBJECTIVE & OBJECTIVE
Interval History: NAEON. S/P left percutaneous nephrostomy tube change. Reports pain/soreness. Afebrile. Urine cx + GNR.    Review of Systems   Constitutional:  Positive for activity change, appetite change and fatigue. Negative for chills.   HENT: Negative.     Eyes: Negative.    Respiratory:  Negative for shortness of breath and wheezing.    Cardiovascular:  Negative for chest pain and leg swelling.   Gastrointestinal:  Positive for abdominal pain and nausea. Negative for abdominal distention, blood in stool, constipation and diarrhea.   Genitourinary:  Negative for decreased urine volume, difficulty urinating, hematuria and pelvic pain.        Left nephrostomy tube      Musculoskeletal:  Negative for back pain.   Skin:  Negative for wound.   Neurological:  Negative for dizziness, syncope and headaches.   Psychiatric/Behavioral:  Negative for agitation. The patient is not nervous/anxious.      Objective:     Vital Signs (Most Recent):  Temp: 98.2 °F (36.8 °C) (09/11/23 1146)  Pulse: 76 (09/11/23 1146)  Resp: 18 (09/11/23 1146)  BP: 131/60 (09/11/23 1146)  SpO2: 99 % (09/11/23 1146) Vital Signs (24h Range):  Temp:  [97.3 °F (36.3 °C)-98.6 °F (37 °C)] 98.2 °F (36.8 °C)  Pulse:  [71-95] 76  Resp:  [14-20] 18  SpO2:  [97 %-100 %] 99 %  BP: ()/(53-90) 131/60     Weight: 70.8 kg (156 lb)  Body mass index is 23.04 kg/m².    Estimated Creatinine Clearance: 32.9 mL/min (A) (based on SCr of 1.9 mg/dL (H)).     Physical Exam  Vitals and nursing note reviewed.   Constitutional:       General: She is not in acute distress.     Appearance: She is ill-appearing (chronically ill appearing). She is not toxic-appearing or diaphoretic.   HENT:      Head: Normocephalic and atraumatic.      Mouth/Throat:      Mouth: Mucous membranes are moist.   Eyes:      General: No scleral icterus.     Conjunctiva/sclera: Conjunctivae normal.   Cardiovascular:      Rate and Rhythm: Normal rate and regular rhythm.      Heart sounds: No murmur  heard.  Pulmonary:      Effort: Pulmonary effort is normal. No respiratory distress.      Breath sounds: Normal breath sounds.   Abdominal:      General: There is no distension.      Palpations: Abdomen is soft.      Tenderness: There is abdominal tenderness. There is right CVA tenderness. There is no left CVA tenderness.      Comments: Ileostomy in place   Urostomy - yellow urine   Genitourinary:     Comments: Left Nephrostomy in place - clear urine  Musculoskeletal:      Right lower leg: No edema.      Left lower leg: No edema.   Skin:     General: Skin is warm and dry.   Neurological:      Mental Status: She is alert and oriented to person, place, and time.   Psychiatric:         Mood and Affect: Mood normal.         Behavior: Behavior normal.          Significant Labs: CBC:   Recent Labs   Lab 09/10/23  0438 09/11/23  0612   WBC 11.91 5.99   HGB 14.6 13.3   HCT 46.3 43.4    296     CMP:   Recent Labs   Lab 09/10/23  0438 09/11/23  0612    141   K 4.8 3.8   * 113*   CO2 12* 17*   GLU 93 102   BUN 66* 49*   CREATININE 2.8* 1.9*   CALCIUM 9.6 9.3   PROT 9.3* 7.9   ALBUMIN 3.4* 3.1*   BILITOT 0.3 0.5   ALKPHOS 118 111   AST 26 27   ALT 27 24   ANIONGAP 17* 11     Microbiology Results (last 7 days)       Procedure Component Value Units Date/Time    Urine culture [[id number]] Collected: 09/11/23 1226    Order Status: Sent Specimen: Urine, Nephrostomy Tube, Left Updated: 09/11/23 1307    Urine culture [[id number]]     Order Status: Canceled Specimen: Urine, Nephrostomy Tube, Left     Blood Culture #2 **CANNOT BE ORDERED STAT** [[id number]] Collected: 09/09/23 1806    Order Status: Completed Specimen: Blood from Peripheral, Hand, Left Updated: 09/10/23 2012     Blood Culture, Routine No Growth to date      No Growth to date    Blood Culture #1 **CANNOT BE ORDERED STAT** [[id number]] Collected: 09/09/23 1807    Order Status: Completed Specimen: Blood from Peripheral, Hand, Right Updated: 09/10/23 2012      Blood Culture, Routine No Growth to date      No Growth to date    Urine culture [[id number]]  (Abnormal) Collected: 09/09/23 1416    Order Status: Completed Specimen: Urine Updated: 09/10/23 1952     Urine Culture, Routine GRAM NEGATIVE BALAJI  >100,000 cfu/ml  Identification and susceptibility pending      Narrative:      Specimen Source->Urine          Recent Lab Results  (Last 5 results in the past 24 hours)        09/11/23  1145   09/11/23  0836   09/11/23  0612   09/11/23  0540   09/10/23  1609        Albumin     3.1           Alkaline Phosphatase     111           ALT     24           Anion Gap     11           AST     27           Baso #     0.02           Basophil %     0.3           BILIRUBIN TOTAL     0.5  Comment: For infants and newborns, interpretation of results should be based  on gestational age, weight and in agreement with clinical  observations.    Premature Infant recommended reference ranges:  Up to 24 hours.............<8.0 mg/dL  Up to 48 hours............<12.0 mg/dL  3-5 days..................<15.0 mg/dL  6-29 days.................<15.0 mg/dL             BUN     49           Calcium     9.3           Chloride     113           CO2     17           Creatinine     1.9           Differential Method     Automated           eGFR     29.9           Eos #     0.0           Eosinophil %     0.7           Glucose     102           Gran # (ANC)     3.6           Gran %     60.6           Hematocrit     43.4           Hemoglobin     13.3           Immature Grans (Abs)     0.02  Comment: Mild elevation in immature granulocytes is non specific and   can be seen in a variety of conditions including stress response,   acute inflammation, trauma and pregnancy. Correlation with other   laboratory and clinical findings is essential.             Immature Granulocytes     0.3           INR   1.0  Comment: Coumadin Therapy:  2.0 - 3.0 for INR for all indicators except mechanical heart valves  and  antiphospholipid syndromes which should use 2.5 - 3.5.               Lymph #     1.1           Lymph %     18.4           Magnesium      2.4           MCH     27.3           MCHC     30.6           MCV     89           Mono #     1.2           Mono %     19.7           MPV     11.0           nRBC     0           Phosphorus     2.5           Platelets     296           POCT Glucose 96       97   121       Potassium     3.8           PROTEIN TOTAL     7.9           Protime   10.9             RBC     4.88           RDW     14.9           Sodium     141           WBC     5.99                                  Significant Imaging:   Imaging Results              CT Abdomen Pelvis  Without Contrast (Final result)  Result time 09/09/23 20:07:17      Final result by Vinayak Baer DO (09/09/23 20:07:17)                   Impression:      1. Postoperative changes of urinary diversion with a colon conduit and left lower quadrant colostomy.  Continued moderate right-sided hydronephrosis with mild wall thickening of the right proximal ureter.  Recommend correlation with urinalysis to exclude pyelitis.  2. Left-sided percutaneous nephrostomy tube in place.  No left-sided hydronephrosis.  3. Postoperative changes of partial colectomy with a right lower quadrant ileostomy.  No bowel obstruction or parastomal hernia.      Electronically signed by: Vinayak Baer  Date:    09/09/2023  Time:    20:07               Narrative:    EXAMINATION:  CT ABDOMEN PELVIS WITHOUT CONTRAST    CLINICAL HISTORY:  Abdominal abscess/infection suspected;Nausea/vomiting;Bowel obstruction suspected;    TECHNIQUE:  Multiplanar images were obtained of the abdomen and pelvis from the hemidiaphragms through the symphysis pubis without intravenous contrast.    COMPARISON:  CT of the abdomen and pelvis from 08/18/2023.    FINDINGS:  Lung Bases: Clear.    Heart: Heart size is normal.  No pericardial effusion.    Liver: Normal in size and attenuation without  focal hepatic lesion.    Biliary tract: There is mild prominence of the common bile duct, stable from prior and likely related to cholecystectomy changes.  No intrahepatic biliary ductal a shin.    Gallbladder: Surgically absent.    Pancreas: Normal. No pancreatic ductal dilatation.    Spleen: Normal size without focal lesion.    Adrenals: Normal.    Kidneys and urinary collecting systems: There are postoperative changes of urinary diversion with a colon conduit and left lower quadrant colostomy.  There is a left-sided percutaneous nephrostomy tube with the pigtail in the renal collecting system.  There is no left-sided hydronephrosis.  There is a moderate right-sided hydronephrosis, relatively stable in comparison with prior.    Lymph nodes: None enlarged.    Stomach and bowel: The stomach is normal.  There are postop changes of partial colectomy with a right lower quadrant and ileostomy.  There is no bowel obstruction.  There is no peristomal hernia.    Peritoneum and mesentery: No ascites or free intraperitoneal air. No abdominal fluid collection.  Multiple surgical clips noted in the pelvis and lower abdomen.    Vasculature: Normal.    Urinary bladder: The urinary bladder is contracted    Reproductive organs: The uterus is absent.    Body wall: No abnormality.    Musculoskeletal: No aggressive osseous lesion.                        Final result by Vinayak Baer DO (09/09/23 20:07:17)                   Impression:      1. Postoperative changes of urinary diversion with a colon conduit and left lower quadrant colostomy.  Continued moderate right-sided hydronephrosis with mild wall thickening of the right proximal ureter.  Recommend correlation with urinalysis to exclude pyelitis.  2. Left-sided percutaneous nephrostomy tube in place.  No left-sided hydronephrosis.  3. Postoperative changes of partial colectomy with a right lower quadrant ileostomy.  No bowel obstruction or parastomal  hernia.      Electronically signed by: Vinayak Baer  Date:    09/09/2023  Time:    20:07               Narrative:    EXAMINATION:  CT ABDOMEN PELVIS WITHOUT CONTRAST    CLINICAL HISTORY:  Abdominal abscess/infection suspected;Nausea/vomiting;Bowel obstruction suspected;    TECHNIQUE:  Multiplanar images were obtained of the abdomen and pelvis from the hemidiaphragms through the symphysis pubis without intravenous contrast.    COMPARISON:  CT of the abdomen and pelvis from 08/18/2023.    FINDINGS:  Lung Bases: Clear.    Heart: Heart size is normal.  No pericardial effusion.    Liver: Normal in size and attenuation without focal hepatic lesion.    Biliary tract: There is mild prominence of the common bile duct, stable from prior and likely related to cholecystectomy changes.  No intrahepatic biliary ductal a shin.    Gallbladder: Surgically absent.    Pancreas: Normal. No pancreatic ductal dilatation.    Spleen: Normal size without focal lesion.    Adrenals: Normal.    Kidneys and urinary collecting systems: There are postoperative changes of urinary diversion with a colon conduit and left lower quadrant colostomy.  There is a left-sided percutaneous nephrostomy tube with the pigtail in the renal collecting system.  There is no left-sided hydronephrosis.  There is a moderate right-sided hydronephrosis, relatively stable in comparison with prior.    Lymph nodes: None enlarged.    Stomach and bowel: The stomach is normal.  There are postop changes of partial colectomy with a right lower quadrant and ileostomy.  There is no bowel obstruction.  There is no peristomal hernia.    Peritoneum and mesentery: No ascites or free intraperitoneal air. No abdominal fluid collection.  Multiple surgical clips noted in the pelvis and lower abdomen.    Vasculature: Normal.    Urinary bladder: The urinary bladder is contracted    Reproductive organs: The uterus is absent.    Body wall: No abnormality.    Musculoskeletal: No aggressive  osseous lesion.

## 2023-09-11 NOTE — ASSESSMENT & PLAN NOTE
58 year old woman with hx of cervical CA c/b bilateral ureteral strictures requiring bilateral nephrostomy tubes (currently left tube remains only; right removed in April ), s/p urinary to colon conduit 2020 c/b bowel perforation s/p right hemicolectomy and ostomy and recurrent MDR infections , QTc prolongation, PTSD, major depression who presented with fatigue and abdominal pain X 2 weeks.  Noted to have ODESSA (creatinine 3.7, baseline 1.1), U/A >100 WBC (unclear source).  CT A/P with right sided hydronephrosis with thickening of the proximal right ureter and concern for pyelitis. Left NT shown to be in good position.  ID consulted for pyelitis.       Complicated urologic history.  Most recently had antegrade nephrostogram and loopogram 6/6/223 which showed reflux on the right  And left distal ureteral stricture.  Left nephrostomy tube last changed on 6/7.  Urology consulted to assess for need for inpatient NT exchange.  Evaluated today and recommends left NT exchange with IR. Reviewed CT and determined right hydronephrosis stable.  No further intervention recommended at this time.      Blood cultures NGTD. Urine culture + GNR. VSS.  She is currently on IV ertapenem given hx of MDR UTI. Went for left percutaneous nephrostomy tube change today.    Recommendations  1. Continue IV ertapenem, though increase dose given improved renal function  2. Follow up urine culture and adjust antibiotics accordingly  3. Will follow up tomorrow.     Data reviewed and plan discussed with ID staff, Dr. Padilla.

## 2023-09-11 NOTE — NURSING
Pt  left the floor to IR for Lt nephrostomy tube exchange. Pt is A,A,O x  4 .  Stable  V/S. Ptis NPO. Lr bolus started as ordered. Pt left on stretcher with transporter.

## 2023-09-11 NOTE — ASSESSMENT & PLAN NOTE
Creatinine 3.7 on admit, baseline around 1.1  GFR on admit 13.5, baseline around 50    Plan:   Lab Results   Component Value Date    CREATININE 1.9 (H) 09/11/2023     - See ODESSA  - Avoid nephrotoxic agents such as NSAIDs, gadolinium and IV radiocontrast.  - Renally dose meds to current GFR.  - Maintain MAP > 65.

## 2023-09-11 NOTE — PLAN OF CARE
Problem: Violence Risk or Actual  Goal: Anger and Impulse Control  Outcome: Ongoing, Progressing     Problem: Adult Inpatient Plan of Care  Goal: Plan of Care Review  Outcome: Ongoing, Progressing  Goal: Absence of Hospital-Acquired Illness or Injury  Outcome: Ongoing, Progressing     Problem: Skin Injury Risk Increased  Goal: Skin Health and Integrity  Outcome: Ongoing, Progressing     Problem: Adult Inpatient Plan of Care  Goal: Patient-Specific Goal (Individualized)  Outcome: Ongoing, Not Progressing  Goal: Optimal Comfort and Wellbeing  Outcome: Ongoing, Not Progressing  Goal: Readiness for Transition of Care  Outcome: Ongoing, Not Progressing     Problem: Fluid and Electrolyte Imbalance (Acute Kidney Injury/Impairment)  Goal: Fluid and Electrolyte Balance  Outcome: Ongoing, Not Progressing     Problem: Oral Intake Inadequate (Acute Kidney Injury/Impairment)  Goal: Optimal Nutrition Intake  Outcome: Ongoing, Not Progressing     Problem: Renal Function Impairment (Acute Kidney Injury/Impairment)  Goal: Effective Renal Function  Outcome: Ongoing, Not Progressing   Pt is A,A,O x 4 . Stable V/S . No C/O pain during the day. Pt turned in bed independently. No falls or injuries per day. Pt received 1L of LR bolus today. Pt went to IR today and did Lt nephrostomy tube exchange. Urine sample for culture collected from the new nephrostomy tube and sent to lab. Urostomy bag changed by wound care nurse. Ileostomy bag care done . All bags out put measured and  charted in Epic. Pt started on continues IVF LR @ 100 cc/hr.

## 2023-09-11 NOTE — H&P
Inpatient Radiology Pre-procedure Note    History of Present Illness:  Edita Riley is a 58 y.o. female with B ureteral stricture 2/2 XRT who presents for L PCN exchange.    Admission H&P reviewed.  Past Medical History:   Diagnosis Date    Abnormal mammogram 08/25/2020    Acute blood loss anemia     Acute deep vein thrombosis (DVT) of lower extremity 12/09/2020    Advance care planning 04/30/2021    Anemia due to chronic blood loss     Anemia due to chronic kidney disease     Anxiety     Bilateral ureteral obstruction 9/11/2020    Cardiovascular event risk -- low 09/14/2015    ASCVD 10-year risk 1.9% (with optimal risk factors 1.3%) as of 9/14/2015     Cervical cancer 2014    Chronic back pain     Colostomy care     Deep vein thrombosis     Depression     Diarrhea due to malabsorption 11/14/2018    Difficult intubation     Disorder of kidney and ureter     DVT of lower extremity, bilateral 11/04/2020    Emphysema of lung 4/10/2023    Fibromyalgia     Fungemia 09/27/2020    Generalized abdominal pain 08/25/2020    GERD (gastroesophageal reflux disease)     Hemifacial spasm 09/16/2015    Hiatal hernia 2014    History of cervical cancer 10/11/2018    Hx of psychiatric care     Cymbalta, trazodone    Hypertension     Hypomagnesemia 11/21/2018    Lactose intolerance     Metastatic squamous cell carcinoma to lymph node 10/11/2018    Neuropathy due to chemotherapeutic drug 11/14/2018    Osteoarthritis of back     Peritonitis 09/22/2020    Pseudomonas urinary tract infection 04/21/2021    Psychiatric problem     Refusal of blood transfusions as patient is Bahai     Schatzki's ring 09/14/2015    Seen on outside EGD 05/2014, underwent esophageal dilatation. Bx were negative.     Seizures     Sleep stage dysfunction     Noted on PSG 06/2017; negative for obstructive sleep apnea     Stroke     Urinary tract infection associated with nephrostomy catheter 06/11/2020    Wound infection after surgery  09/24/2020     Past Surgical History:   Procedure Laterality Date    ANTEGRADE NEPHROSTOGRAPHY Bilateral 9/28/2020    Procedure: Nephrostogram - antegrade;  Surgeon: Leslie Balbuena MD;  Location: [facility] OR 1ST FLR;  Service: Urology;  Laterality: Bilateral;    ANTEGRADE NEPHROSTOGRAPHY Left 4/20/2021    Procedure: NEPHROSTOGRAM, ANTEGRADE;  Surgeon: Leslie Balbuena MD;  Location: [facility] OR 1ST FLR;  Service: Urology;  Laterality: Left;    ANTEGRADE NEPHROSTOGRAPHY Right 10/27/2022    Procedure: NEPHROSTOGRAM, ANTEGRADE;  Surgeon: Chirag Russ MD;  Location: [facility] OR 1ST FLR;  Service: Urology;  Laterality: Right;    ANTEGRADE NEPHROSTOGRAPHY Right 4/21/2023    Procedure: NEPHROSTOGRAM, ANTEGRADE;  Surgeon: Chirag Russ MD;  Location: [facility] OR 2ND FLR;  Service: Urology;  Laterality: Right;    ANTEGRADE NEPHROSTOGRAPHY Left 6/6/2023    Procedure: Nephrostogram - antegrade;  Surgeon: Leslie Balbuena MD;  Location: [facility] OR 1ST FLR;  Service: Urology;  Laterality: Left;    BILATERAL OOPHORECTOMY  2015    CHOLECYSTECTOMY  11/09/2016    Done at [facility], path showed chronic cholecystitis and gallstones    cold knife conization  2014    COLECTOMY, RIGHT  9/17/2020    Procedure: COLECTOMY, RIGHT Extended;  Surgeon: Hammad Reynolds MD;  Location: [facility] OR 2ND FLR;  Service: Colon and Rectal;;    COLONOSCOPY  2014    COLONOSCOPY N/A 10/24/2016    at [facility], Dr Thomas    COLONOSCOPY N/A 5/18/2018    Procedure: COLONOSCOPY;  Surgeon: Arden Gutiérrez MD;  Location: [facility] ENDO (4TH FLR);  Service: Endoscopy;  Laterality: N/A;    COLONOSCOPY N/A 7/28/2020    Procedure: COLONOSCOPY;  Surgeon: Hammad Reynolds MD;  Location: [facility] ENDO (4TH FLR);  Service: Colon and Rectal;  Laterality: N/A;  covid test [place] 7/25    CYSTOSCOPY WITH URETEROSCOPY, RETROGRADE PYELOGRAPHY, AND INSERTION OF STENT Bilateral 3/21/2020    Procedure: CYSTOSCOPY, WITH RETROGRADE PYELOGRAM,;  Surgeon: Leslie Balbuena MD;  Location: [facility] OR 1ST  FLR;  Service: Urology;  Laterality: Bilateral;    DILATION OF NEPHROSTOMY TRACT Right 10/27/2022    Procedure: DILATION, NEPHROSTOMY TRACT;  Surgeon: Chirag Russ MD;  Location: [facility] OR 1ST FLR;  Service: Urology;  Laterality: Right;    ESOPHAGOGASTRODUODENOSCOPY  2014    ESOPHAGOGASTRODUODENOSCOPY  11/18/2020    ESOPHAGOGASTRODUODENOSCOPY N/A 11/18/2020    Procedure: ESOPHAGOGASTRODUODENOSCOPY (EGD);  Surgeon: Zenon Spencer MD;  Location: [facility] ENDO;  Service: Endoscopy;  Laterality: N/A;    ESOPHAGOGASTRODUODENOSCOPY N/A 12/11/2020    Procedure: EGD (ESOPHAGOGASTRODUODENOSCOPY);  Surgeon: Juancho Muse MD;  Location: [facility] (2ND FLR);  Service: Endoscopy;  Laterality: N/A;    HYSTERECTOMY  2014    [facility] for cervical cancer    ILEOSTOMY  9/17/2020    Procedure: CREATION, ILEOSTOMY;  Surgeon: Hammad Reynolds MD;  Location: [facility] OR 2ND FLR;  Service: Colon and Rectal;;    LOOPOGRAM N/A 4/20/2021    Procedure: LOOPOGRAM;  Surgeon: Leslie Balbuena MD;  Location: [facility] OR 1ST FLR;  Service: Urology;  Laterality: N/A;    LOOPOGRAM N/A 6/6/2023    Procedure: LOOPOGRAM;  Surgeon: Leslie Balbuena MD;  Location: [facility] OR 1ST FLR;  Service: Urology;  Laterality: N/A;    MOBILIZATION OF SPLENIC FLEXURE N/A 9/11/2020    Procedure: MOBILIZATION, COLONIC;  Surgeon: Hammad Reynolds MD;  Location: [facility] OR 2ND FLR;  Service: Colon and Rectal;  Laterality: N/A;    NEPHROSTOGRAPHY Bilateral 4/17/2021    Procedure: Nephrostogram;  Surgeon: Celeste Surgeon;  Location: [facility] CELESTE;  Service: Anesthesiology;  Laterality: Bilateral;    OOPHORECTOMY      PERCUTANEOUS NEPHROLITHOTOMY Right 4/21/2023    Procedure: NEPHROLITHOTOMY, PERCUTANEOUS;  Surgeon: Chirag Russ MD;  Location: [facility] OR 2ND FLR;  Service: Urology;  Laterality: Right;  2.5 hrs    PERCUTANEOUS NEPHROSTOMY  4/21/2023    Procedure: CREATION, NEPHROSTOMY, PERCUTANEOUS with removal of existing nephrostomy tube;  Surgeon: Chirag Russ MD;  Location: [facility]  OR 2ND FLR;  Service: Urology;;    PYELOSCOPY Right 10/27/2022    Procedure: PYELOSCOPY;  Surgeon: Chirag Russ MD;  Location: [facility] OR 1ST FLR;  Service: Urology;  Laterality: Right;    REPLACEMENT OF NEPHROSTOMY TUBE Right 10/27/2022    Procedure: REPLACEMENT, NEPHROSTOMY TUBE;  Surgeon: Chirag Russ MD;  Location: [facility] OR 1ST FLR;  Service: Urology;  Laterality: Right;    RETROPERITONEAL LYMPHADENECTOMY Right 9/17/2018    Procedure: LYMPHADENECTOMY, RETROPERITONEUM;  Surgeon: Sebas Reed MD;  Location: [facility] OR 2ND FLR;  Service: General;  Laterality: Right;  ILIAC    URETEROSCOPIC REMOVAL OF URETERIC CALCULUS Right 10/27/2022    Procedure: REMOVAL, CALCULUS, URETER, URETEROSCOPIC;  Surgeon: Chirag Russ MD;  Location: [facility] OR 1ST FLR;  Service: Urology;  Laterality: Right;    URETEROSCOPY Right 10/27/2022    Procedure: URETEROSCOPY-ANTEGRADE;  Surgeon: Chirag Russ MD;  [facility];  Service: Urology;  Laterality: Right;       Review of Systems:   As documented in primary team H&P    Home Meds:   Prior to Admission medications    Medication Sig Start Date End Date Taking? Authorizing Provider   gabapentin (NEURONTIN) 100 MG capsule Take 2 capsules (200 mg total) by mouth every evening. 9/1/22   Danuta Barboza MD   mirtazapine (REMERON) 30 MG tablet Take 1 tablet (30 mg total) by mouth nightly. 6/3/23 6/2/24  Mell Adhikari MD   oxyCODONE (ROXICODONE) 5 MG immediate release tablet Take 1 tablet (5 mg total) by mouth every 12 (twelve) hours as needed for Pain. 6/3/23   Mell Adhikari MD   tolterodine (DETROL LA) 2 MG Cp24 Take 2 mg by mouth once daily.    Provider, Historical     Scheduled Meds:    ertapenem (INVANZ) IVPB  500 mg Intravenous Q24H     Continuous Infusions:   PRN Meds:dextrose 10%, dextrose 10%, glucagon (human recombinant), glucose, glucose, insulin aspart U-100, melatonin, naloxone, sodium chloride 0.9%  Anticoagulants/Antiplatelets: no  "anticoagulation    Allergies:   Review of patient's allergies indicates:   Allergen Reactions    Bee sting [allergen ext-venom-honey bee]      Rash      Grass pollen-bermuda, standard      rash     Sedation Hx: have not been any systemic reactions    Labs:  No results for input(s): "INR", "PT", "PTT" in the last 168 hours.    Recent Labs   Lab 09/11/23 0612   WBC 5.99   HGB 13.3   HCT 43.4   MCV 89         Recent Labs   Lab 09/11/23 0612         K 3.8   *   CO2 17*   BUN 49*   CREATININE 1.9*   CALCIUM 9.3   MG 2.4   ALT 24   AST 27   ALBUMIN 3.1*   BILITOT 0.5         Vitals:  Temp: 97.8 °F (36.6 °C) (09/11/23 0722)  Pulse: 85 (09/11/23 0722)  Resp: 16 (09/11/23 0722)  BP: 101/60 (09/11/23 0722)  SpO2: 98 % (09/11/23 0722)     Physical Exam:  ASA: 3  Mallampati: 2    General: no acute distress  Mental Status: alert and oriented to person, place and time  HEENT: normocephalic, atraumatic  Chest: unlabored breathing  Heart: regular heart rate  Abdomen: nondistended; L PCN in place  Extremity: moves all extremities    Plan: L PCN exchange    Sedation Plan: up to moderate    Bro Brambila MD  PGY-5  RADIOLOGY    "

## 2023-09-12 LAB
ALBUMIN SERPL BCP-MCNC: 2.6 G/DL (ref 3.5–5.2)
ALP SERPL-CCNC: 92 U/L (ref 55–135)
ALT SERPL W/O P-5'-P-CCNC: 31 U/L (ref 10–44)
ANION GAP SERPL CALC-SCNC: 9 MMOL/L (ref 8–16)
AST SERPL-CCNC: 48 U/L (ref 10–40)
BASOPHILS # BLD AUTO: 0.03 K/UL (ref 0–0.2)
BASOPHILS NFR BLD: 0.7 % (ref 0–1.9)
BILIRUB SERPL-MCNC: 0.4 MG/DL (ref 0.1–1)
BUN SERPL-MCNC: 23 MG/DL (ref 6–20)
CALCIUM SERPL-MCNC: 8.5 MG/DL (ref 8.7–10.5)
CHLORIDE SERPL-SCNC: 115 MMOL/L (ref 95–110)
CO2 SERPL-SCNC: 18 MMOL/L (ref 23–29)
CREAT SERPL-MCNC: 1.1 MG/DL (ref 0.5–1.4)
DIFFERENTIAL METHOD: ABNORMAL
EOSINOPHIL # BLD AUTO: 0.1 K/UL (ref 0–0.5)
EOSINOPHIL NFR BLD: 1.7 % (ref 0–8)
ERYTHROCYTE [DISTWIDTH] IN BLOOD BY AUTOMATED COUNT: 15 % (ref 11.5–14.5)
EST. GFR  (NO RACE VARIABLE): 57.5 ML/MIN/1.73 M^2
GLUCOSE SERPL-MCNC: 88 MG/DL (ref 70–110)
HCT VFR BLD AUTO: 36.5 % (ref 37–48.5)
HGB BLD-MCNC: 11.3 G/DL (ref 12–16)
IMM GRANULOCYTES # BLD AUTO: 0.01 K/UL (ref 0–0.04)
IMM GRANULOCYTES NFR BLD AUTO: 0.2 % (ref 0–0.5)
LYMPHOCYTES # BLD AUTO: 1.2 K/UL (ref 1–4.8)
LYMPHOCYTES NFR BLD: 27.9 % (ref 18–48)
MAGNESIUM SERPL-MCNC: 1.8 MG/DL (ref 1.6–2.6)
MCH RBC QN AUTO: 26.6 PG (ref 27–31)
MCHC RBC AUTO-ENTMCNC: 31 G/DL (ref 32–36)
MCV RBC AUTO: 86 FL (ref 82–98)
MONOCYTES # BLD AUTO: 0.9 K/UL (ref 0.3–1)
MONOCYTES NFR BLD: 20.1 % (ref 4–15)
NEUTROPHILS # BLD AUTO: 2.1 K/UL (ref 1.8–7.7)
NEUTROPHILS NFR BLD: 49.4 % (ref 38–73)
NRBC BLD-RTO: 0 /100 WBC
PHOSPHATE SERPL-MCNC: 1.9 MG/DL (ref 2.7–4.5)
PLATELET # BLD AUTO: 265 K/UL (ref 150–450)
PMV BLD AUTO: 10.7 FL (ref 9.2–12.9)
POCT GLUCOSE: 101 MG/DL (ref 70–110)
POCT GLUCOSE: 139 MG/DL (ref 70–110)
POCT GLUCOSE: 99 MG/DL (ref 70–110)
POCT GLUCOSE: 99 MG/DL (ref 70–110)
POTASSIUM SERPL-SCNC: 3.5 MMOL/L (ref 3.5–5.1)
PROT SERPL-MCNC: 6.4 G/DL (ref 6–8.4)
RBC # BLD AUTO: 4.25 M/UL (ref 4–5.4)
SODIUM SERPL-SCNC: 142 MMOL/L (ref 136–145)
WBC # BLD AUTO: 4.23 K/UL (ref 3.9–12.7)

## 2023-09-12 PROCEDURE — 80053 COMPREHEN METABOLIC PANEL: CPT

## 2023-09-12 PROCEDURE — 25000003 PHARM REV CODE 250

## 2023-09-12 PROCEDURE — 99233 PR SUBSEQUENT HOSPITAL CARE,LEVL III: ICD-10-PCS | Mod: ,,, | Performed by: PHYSICIAN ASSISTANT

## 2023-09-12 PROCEDURE — 63600175 PHARM REV CODE 636 W HCPCS

## 2023-09-12 PROCEDURE — 63600175 PHARM REV CODE 636 W HCPCS: Performed by: PHYSICIAN ASSISTANT

## 2023-09-12 PROCEDURE — 85025 COMPLETE CBC W/AUTO DIFF WBC: CPT

## 2023-09-12 PROCEDURE — 63600175 PHARM REV CODE 636 W HCPCS: Performed by: STUDENT IN AN ORGANIZED HEALTH CARE EDUCATION/TRAINING PROGRAM

## 2023-09-12 PROCEDURE — 83735 ASSAY OF MAGNESIUM: CPT

## 2023-09-12 PROCEDURE — 36415 COLL VENOUS BLD VENIPUNCTURE: CPT

## 2023-09-12 PROCEDURE — 84100 ASSAY OF PHOSPHORUS: CPT

## 2023-09-12 PROCEDURE — 99233 SBSQ HOSP IP/OBS HIGH 50: CPT | Mod: ,,, | Performed by: HOSPITALIST

## 2023-09-12 PROCEDURE — 25000003 PHARM REV CODE 250: Performed by: PHYSICIAN ASSISTANT

## 2023-09-12 PROCEDURE — 11000001 HC ACUTE MED/SURG PRIVATE ROOM

## 2023-09-12 PROCEDURE — 99233 SBSQ HOSP IP/OBS HIGH 50: CPT | Mod: ,,, | Performed by: PHYSICIAN ASSISTANT

## 2023-09-12 PROCEDURE — 99233 PR SUBSEQUENT HOSPITAL CARE,LEVL III: ICD-10-PCS | Mod: ,,, | Performed by: HOSPITALIST

## 2023-09-12 RX ORDER — ACETAMINOPHEN 325 MG/1
650 TABLET ORAL EVERY 6 HOURS
Status: DISCONTINUED | OUTPATIENT
Start: 2023-09-12 | End: 2023-09-14 | Stop reason: HOSPADM

## 2023-09-12 RX ORDER — SODIUM CHLORIDE, SODIUM LACTATE, POTASSIUM CHLORIDE, CALCIUM CHLORIDE 600; 310; 30; 20 MG/100ML; MG/100ML; MG/100ML; MG/100ML
INJECTION, SOLUTION INTRAVENOUS CONTINUOUS
Status: ACTIVE | OUTPATIENT
Start: 2023-09-12 | End: 2023-09-12

## 2023-09-12 RX ORDER — ACETAMINOPHEN 325 MG/1
650 TABLET ORAL EVERY 6 HOURS PRN
Status: DISCONTINUED | OUTPATIENT
Start: 2023-09-12 | End: 2023-09-12

## 2023-09-12 RX ORDER — ACETAMINOPHEN 325 MG/1
650 TABLET ORAL EVERY 6 HOURS
Status: DISCONTINUED | OUTPATIENT
Start: 2023-09-12 | End: 2023-09-12

## 2023-09-12 RX ADMIN — SODIUM CHLORIDE, POTASSIUM CHLORIDE, SODIUM LACTATE AND CALCIUM CHLORIDE: 600; 310; 30; 20 INJECTION, SOLUTION INTRAVENOUS at 01:09

## 2023-09-12 RX ADMIN — DIBASIC SODIUM PHOSPHATE, MONOBASIC POTASSIUM PHOSPHATE AND MONOBASIC SODIUM PHOSPHATE 2 TABLET: 852; 155; 130 TABLET ORAL at 09:09

## 2023-09-12 RX ADMIN — SODIUM CHLORIDE, POTASSIUM CHLORIDE, SODIUM LACTATE AND CALCIUM CHLORIDE: 600; 310; 30; 20 INJECTION, SOLUTION INTRAVENOUS at 08:09

## 2023-09-12 RX ADMIN — HEPARIN SODIUM 5000 UNITS: 5000 INJECTION INTRAVENOUS; SUBCUTANEOUS at 05:09

## 2023-09-12 RX ADMIN — DIBASIC SODIUM PHOSPHATE, MONOBASIC POTASSIUM PHOSPHATE AND MONOBASIC SODIUM PHOSPHATE 2 TABLET: 852; 155; 130 TABLET ORAL at 08:09

## 2023-09-12 RX ADMIN — ACETAMINOPHEN 650 MG: 325 TABLET ORAL at 05:09

## 2023-09-12 RX ADMIN — ERTAPENEM 1 G: 1 INJECTION INTRAMUSCULAR; INTRAVENOUS at 05:09

## 2023-09-12 RX ADMIN — DAPTOMYCIN 565 MG: 350 INJECTION, POWDER, LYOPHILIZED, FOR SOLUTION INTRAVENOUS at 05:09

## 2023-09-12 RX ADMIN — HEPARIN SODIUM 5000 UNITS: 5000 INJECTION INTRAVENOUS; SUBCUTANEOUS at 09:09

## 2023-09-12 RX ADMIN — HEPARIN SODIUM 5000 UNITS: 5000 INJECTION INTRAVENOUS; SUBCUTANEOUS at 01:09

## 2023-09-12 RX ADMIN — ACETAMINOPHEN 650 MG: 325 TABLET ORAL at 01:09

## 2023-09-12 NOTE — NURSING
Nurses Note -- 4 Eyes      9/12/2023   2:17 PM      Skin assessed during: Q Shift Change      [x] No Altered Skin Integrity Present    []Prevention Measures Documented      [] Yes- Altered Skin Integrity Present or Discovered   [] LDA Added if Not in Epic (Describe Wound)   [] New Altered Skin Integrity was Present on Admit and Documented in LDA   [] Wound Image Taken    Wound Care Consulted? No    Attending Nurse:  Ginny Lovell RN/Staff Member:   Lindy [place]

## 2023-09-12 NOTE — ASSESSMENT & PLAN NOTE
This is a 58 year old female with complicated medical history including cervical cancer in 2014 s/p chemotherapy and XRT who then developed ureteral stricture 2/2 XRT s/p bilateral nephrostomy tubes (R nephrostomy tube now removed, L tube remains in place) and transverse colon-urinary conduit in 2020, which was c/b bowel perforation requiring right hemicolectomy and ostomy creation, all of which has been complicated by recurrent MDR infections presenting for worsening fatigue and abdominal pain x 2 weeks. No changes in consistency or frequency of ileostomy/urostomy/nephrostomy output.    - She has had significant ID history, including multiple MDR UTIs ( Enterobacter cloacae 5/30, Klebsiella penumoniae ESBL 5/11, E faecium VRE 4/18, and Klebsiella pneumoniae ESBL 2/14). She has received ertapenem and meropenem in the past. Blood cultures in 2020 also with yeast requiring micafungin.   - On admission, she is HDS and without leukocytosis (WBC 11)  - UA w 3+ leuks but negative nitrites and only occasional bacteria. Not overwhelmingly concerning for infection, however given her history and imaging findings, will continue with treatment    Plan:  - Initiate ertapenem while awaiting further results  - F/u urine and blood cultures   - blood cx 9/9/23 NGTD   - urine cx 9/9/23 with Enterobacter cloacae and susceptibilities; other GNR with susceptibilities pending    - urine cx 9/11/23 post nephrostomy exchange pending   - F/u ID and urology consults, appreciate assistance  - Given overall hemodynamic stability, concern for sepsis is low. Will defer lactate at this time, however can consider addition to AM labs if indicated  - pending urine cx results for outpatient abx regimen per ID

## 2023-09-12 NOTE — PROGRESS NOTES
"[facility] Medicine  Progress Note    Patient Name: Edita Riley  MRN: [id number]  Patient Class: IP- Inpatient   Admission Date: 9/9/2023  Length of Stay: 3 days  Attending Physician: Stephanie Rosa MD  Primary Care Provider: Delma, Primary Doctor        Subjective:     Principal Problem:Pyelitis        HPI:  This is a 58 year old female with complicated medical history including cervical cancer in 2014 w recurrence in 2020 s/p chemotherapy and XRT, c/b bilateral ureteral strictures s/p bilateral nephrostomy tubes (R nephrostomy tube now removed, L tube remains in place) and transverse colon-urinary conduit in 2020 (this was also c/b bowel perforation requiring right hemicolectomy and ostomy creation), all of which has been complicated by recurrent MDR infections, as well as CVA at the age of 7, emphysema, htn, DVT of LE in 2020 no longer on treatment, and psychiatric comorbidities requiring prior PEC. She is presenting for worsening fatigue and abdominal pain. When asked how long she has been feeling poorly, she states "62 weeks", however when further prompted, she notes it has been exacerbated over the last 2 weeks. Her  has been ill with some sort of flu-like illness (pt unable to specify further), which she originally thought she caught from him. Today she vomited once, which prompted presentation to the ED. She describes the abdominal pain as "sharp" and located in the center of her abdomen. No aggravating or alleviating factors. Associated symptoms include subjective fever, though when she checked her temperature at home it was 98-99. She denies shortness of breath, cough, or changes in consistency or frequency of ileostomy/nephrostomy output. She has not noticed any new or worsening wounds.     She is established and follows with outpatient urology. Her nephrostomy tube was last exchanged in June, and there are plans for exchange and subsequent revision this month. She was " treated with an empiric course of Cefdinir 7/26, which she states she recently completed. Regarding her ID history, she has had multiple MDR UTIs including Enterobacter cloacae 5/30, Klebsiella penumoniae ESBL 5/11, E faecium VRE 4/18, and Klebsiella pneumoniae ESBL 2/14. She has received ertapenem and meropenem in the past. Blood cultures in 2020 also with yeast requiring micafungin.     In the ED, she was afebrile and HDS on RA. WBC 11.03. Labs also notable for significant ODESSA w BUN 66 and Cr 3.7 (recent baseline ~1.1). UA w 2+ protein, 1+ occult blood, negative nitrites, 3+ leuks, > 100 WBCs, occasional bacteria. CT AP notable for known R sided hydronephrosis w mild wall thickening of proximal R ureter, and possible pyelitis. She was treated with a 1/2L CC fluid bolus and a dose of CTX, and admitted to hospital medicine for further evaluation.       Overview/Hospital Course:  No notes on file    Interval History: NAEON. Patient c/o appropriate pain related to procedure. Patient requesting Boost to help with nutrition.     Review of Systems   Constitutional:  Positive for fatigue and fever. Negative for chills.   HENT:  Negative for sore throat and trouble swallowing.    Eyes:  Negative for visual disturbance.   Respiratory:  Negative for chest tightness, shortness of breath and wheezing.    Cardiovascular:  Negative for chest pain and leg swelling.   Gastrointestinal:  Positive for abdominal pain, nausea and vomiting. Negative for abdominal distention, blood in stool, constipation and diarrhea.   Genitourinary:  Negative for decreased urine volume, difficulty urinating, hematuria and pelvic pain.   Musculoskeletal:  Negative for back pain.   Skin:  Negative for wound.   Neurological:  Negative for dizziness, syncope, light-headedness and headaches.   Psychiatric/Behavioral:  Negative for dysphoric mood. The patient is not nervous/anxious.      Objective:     Vital Signs (Most Recent):  Temp: 97.6 °F (36.4 °C)  (09/12/23 0726)  Pulse: 70 (09/12/23 0726)  Resp: 18 (09/12/23 0726)  BP: (!) 110/59 (09/12/23 0726)  SpO2: 99 % (09/12/23 0726) Vital Signs (24h Range):  Temp:  [97.4 °F (36.3 °C)-98.6 °F (37 °C)] 97.6 °F (36.4 °C)  Pulse:  [67-95] 70  Resp:  [14-20] 18  SpO2:  [99 %-100 %] 99 %  BP: ()/(53-68) 110/59     Weight: 70.8 kg (156 lb)  Body mass index is 23.04 kg/m².    Intake/Output Summary (Last 24 hours) at 9/12/2023 0834  Last data filed at 9/12/2023 0607  Gross per 24 hour   Intake 420 ml   Output 2750 ml   Net -2330 ml         Physical Exam  Vitals and nursing note reviewed.   Constitutional:       General: She is not in acute distress.     Appearance: She is ill-appearing.      Comments: Chronically ill appearing female, no acute distress but in obvious discomfort   HENT:      Head: Normocephalic and atraumatic.   Eyes:      General: No scleral icterus.  Cardiovascular:      Rate and Rhythm: Normal rate and regular rhythm.      Heart sounds: No murmur heard.  Pulmonary:      Effort: Pulmonary effort is normal. No respiratory distress.      Breath sounds: Normal breath sounds. No wheezing or rales.   Abdominal:      General: Abdomen is flat. There is no distension.      Palpations: Abdomen is soft.      Tenderness: There is abdominal tenderness. There is guarding (voluntary).      Comments: Ileostomy bag in place with brown output  Urostomy and nephrostomy bag in place with yellow, non bloody urine  Ostomy sites do not appear irritated or inflamed  Abdomen diffusely tender to palpation  Unable to assess CVA tenderness due to body positioning and pt weakness limiting ability to participate   Musculoskeletal:      Cervical back: Normal range of motion. No rigidity.      Right lower leg: No edema.      Left lower leg: No edema.   Skin:     General: Skin is warm and dry.   Neurological:      General: No focal deficit present.      Mental Status: She is alert.      Motor: Weakness (diffuse) present.    Psychiatric:         Behavior: Behavior normal.         Thought Content: Thought content normal.             Significant Labs: All pertinent labs within the past 24 hours have been reviewed.    CBC:   Recent Labs   Lab 09/11/23  0612 09/11/23 1921 09/12/23  0704   WBC 5.99 6.28 4.23   HGB 13.3 10.8* 11.3*   HCT 43.4 35.3* 36.5*    262 265     CMP:   Recent Labs   Lab 09/11/23  0612 09/11/23 1921 09/12/23  0704    143 142   K 3.8 3.5 3.5   * 113* 115*   CO2 17* 21* 18*    94 88   BUN 49* 33* 23*   CREATININE 1.9* 1.3 1.1   CALCIUM 9.3 8.7 8.5*   PROT 7.9 6.9 6.4   ALBUMIN 3.1* 2.8* 2.6*   BILITOT 0.5 0.4 0.4   ALKPHOS 111 97 92   AST 27 30 48*   ALT 24 25 31   ANIONGAP 11 9 9       Significant Imaging: I have reviewed all pertinent imaging results/findings within the past 24 hours.      Assessment/Plan:      * Pyelitis  This is a 58 year old female with complicated medical history including cervical cancer in 2014 s/p chemotherapy and XRT who then developed ureteral stricture 2/2 XRT s/p bilateral nephrostomy tubes (R nephrostomy tube now removed, L tube remains in place) and transverse colon-urinary conduit in 2020, which was c/b bowel perforation requiring right hemicolectomy and ostomy creation, all of which has been complicated by recurrent MDR infections presenting for worsening fatigue and abdominal pain x 2 weeks. No changes in consistency or frequency of ileostomy/urostomy/nephrostomy output.    - She has had significant ID history, including multiple MDR UTIs ( Enterobacter cloacae 5/30, Klebsiella penumoniae ESBL 5/11, E faecium VRE 4/18, and Klebsiella pneumoniae ESBL 2/14). She has received ertapenem and meropenem in the past. Blood cultures in 2020 also with yeast requiring micafungin.   - On admission, she is HDS and without leukocytosis (WBC 11)  - UA w 3+ leuks but negative nitrites and only occasional bacteria. Not overwhelmingly concerning for infection, however given  Vascular Cardiology  Consult Nnote     S[place] [id number]              EMAIL [email or web address]   OFFICE [phone]    CC:      HPI:        Allergies    No Known Allergies    Intolerances    	    MEDICATIONS:  aspirin enteric coated 81 milliGRAM(s) Oral daily  enoxaparin Injectable 40 milliGRAM(s) SubCutaneous every 24 hours  isosorbide   mononitrate ER Tablet (IMDUR) 30 milliGRAM(s) Oral daily    clarithromycin 500 milliGRAM(s) Oral two times a day  nystatin    Suspension 550081 Unit(s) Oral four times a day    ALBUTerol/ipratropium for Nebulization 3 milliLiter(s) Nebulizer <User Schedule>  buDESOnide   0.5 milliGRAM(s) Respule 0.5 milliGRAM(s) Inhalation every 12 hours  montelukast 10 milliGRAM(s) Oral daily  theophylline ER Capsule 400 milliGRAM(s) Oral daily        dextrose 40% Gel 15 Gram(s) Oral once PRN  dextrose 50% Injectable 12.5 Gram(s) IV Push once  dextrose 50% Injectable 25 Gram(s) IV Push once  dextrose 50% Injectable 25 Gram(s) IV Push once  glucagon  Injectable 1 milliGRAM(s) IntraMuscular once PRN  insulin glargine Injectable (LANTUS) 17 Unit(s) SubCutaneous at bedtime  insulin lispro (HumaLOG) corrective regimen sliding scale   SubCutaneous three times a day before meals  insulin lispro (HumaLOG) corrective regimen sliding scale   SubCutaneous at bedtime  insulin lispro Injectable (HumaLOG) 10 Unit(s) SubCutaneous before dinner  insulin lispro Injectable (HumaLOG) 8 Unit(s) SubCutaneous before breakfast  insulin lispro Injectable (HumaLOG) 8 Unit(s) SubCutaneous before lunch  methylPREDNISolone sodium succinate Injectable 20 milliGRAM(s) IV Push every 6 hours  rosuvastatin 10 milliGRAM(s) Oral at bedtime    artificial tears (preservative free) Ophthalmic Solution 1 Drop(s) Both EYES three times a day  cholecalciferol 2000 Unit(s) Oral daily  dextrose 5%. 1000 milliLiter(s) IV Continuous <Continuous>  multivitamin 1 Tablet(s) Oral daily  sodium chloride 0.65% Nasal 1 Spray(s) Both Nostrils two times a day PRN      PAST MEDICAL & SURGICAL HISTORY:  Hyperlipemia  Chronic sinusitis  Raynaud phenomenon  HTN (hypertension)  DM (diabetes mellitus)  Claustrophobia  COPD (chronic obstructive pulmonary disease)  S/P tonsillectomy      FAMILY HISTORY:  FH: MI (myocardial infarction) (Father)  FH: CABG (coronary artery bypass surgery) (Father)      SOCIAL HISTORY:  unchanged    REVIEW OF SYSTEMS:  CONSTITUTIONAL: No fever, weight loss, or fatigue  EYES: No eye pain, visual disturbances, or discharge  ENMT:  No difficulty hearing, tinnitus, vertigo; No sinus or throat pain  NECK: No pain or stiffness  RESPIRATORY: No cough, wheezing, chills or hemoptysis; No Shortness of Breath    CARDIOVASCULAR: No chest pain, palpitations, passing out, dizziness, or leg swelling    GASTROINTESTINAL: No abdominal or epigastric pain. No nausea, vomiting, or hematemesis; No diarrhea or constipation. No melena or hematochezia.  GENITOURINARY: No dysuria, frequency, hematuria, or incontinence  NEUROLOGICAL: No headaches, memory loss, loss of strength, numbness, or tremors  SKIN: No itching, burning, rashes, or lesions   LYMPH Nodes: No enlarged glands  ENDOCRINE: No heat or cold intolerance; No hair loss  MUSCULOSKELETAL: No joint pain or swelling; No muscle, back, or extremity pain  PSYCHIATRIC: No depression, anxiety, mood swings, or difficulty sleeping  HEME/LYMPH: No easy bruising, or bleeding gums  ALLERY AND IMMUNOLOGIC: No hives or eczema	    [ x] All others negative	  [ ] Unable to obtain    PHYSICAL EXAM:  T(C): 36.7 (12-07-18 @ 13:34), Max: 36.7 (12-07-18 @ 13:34)  HR: 92 (12-07-18 @ 13:34) (84 - 94)  BP: 137/74 (12-07-18 @ 13:34) (137/74 - 147/89)  RR: 18 (12-07-18 @ 13:34) (18 - 18)  SpO2: 98% (12-07-18 @ 13:34) (96% - 98%)  Wt(kg): --  I&O's Summary    06 Dec 2018 07:01  -  07 Dec 2018 07:00  --------------------------------------------------------  IN: 1320 mL / OUT: 0 mL / NET: 1320 mL        Appearance: Normal	  HEENT:   Normal oral mucosa, PERRL, EOMI	  Carotid:   Right:  No Bruit      Left:  No bruit  Lymphatic: No lymphadenopathy  Cardiovascular: Normal S1 S2, No JVD, No murmurs  Respiratory: Lungs clear to auscultation	  Psychiatry: A & O x 3, Mood & affect appropriate  Gastrointestinal:  Soft, Non-tender, + BS	  Skin: No rashes, No ecchymoses, No cyanosis	  Neurologic: Non-focal  Extremities: Normal range of motion.    Vascular Pulse Exam:  Right Femoral:  Palpable       Left Femoral:  Palpable  Right Popliteal:  Palpable      Left Popliteal:  Palpable  Right DP:  Palpable                 Left DP:  Palpable  Right PT:  Palpable                 Left DP:  Palpable    Foot Exam:  Warm, no ulceration.        LABS:	 	    CBC Full  -  ( 06 Dec 2018 11:23 )  WBC Count : 12.5 K/uL  Hemoglobin : 14.0 g/dL  Hematocrit : 40.4 %  Platelet Count - Automated : 273 K/uL  Mean Cell Volume : 88.8 fl  Mean Cell Hemoglobin : 30.7 pg  Mean Cell Hemoglobin Concentration : 34.6 gm/dL  Auto Neutrophil # : x  Auto Lymphocyte # : x  Auto Monocyte # : x  Auto Eosinophil # : x  Auto Basophil # : x  Auto Neutrophil % : x  Auto Lymphocyte % : x  Auto Monocyte % : x  Auto Eosinophil % : x  Auto Basophil % : x    12-07    140  |  94<L>  |  33<H>  ----------------------------<  208<H>  5.8<H>   |  38<H>  |  1.08  12-06    136  |  91<L>  |  33<H>  ----------------------------<  301<H>  5.2   |  36<H>  |  1.05    Ca    9.9      07 Dec 2018 07:12  Ca    9.6      06 Dec 2018 11:20            Assessment:  1.            Plan:  1.          Thank you      Vascular Cardiology Service    Please call with any questions:   GINGER Lara [id number]  Office [phone]  email:  [email or web address] her history and imaging findings, will continue with treatment    Plan:  - Initiate ertapenem while awaiting further results  - F/u urine and blood cultures   - blood cx 9/9/23 NGTD   - urine cx 9/9/23 with Enterobacter cloacae and susceptibilities; other GNR with susceptibilities pending    - urine cx 9/11/23 post nephrostomy exchange pending   - F/u ID and urology consults, appreciate assistance  - Given overall hemodynamic stability, concern for sepsis is low. Will defer lactate at this time, however can consider addition to AM labs if indicated  - pending urine cx results for outpatient abx regimen per ID     History of suicidal ideation  Pt required PEC/CEC 6/4-6/10 of this year due to SI in setting of MDD and complex bereavement (daughter committed suicide May 2023). This was rescinded when she no longer posed as a threat to herself.   Remeron is listed as an outpatient medication however she states she is not taking it. Denies SI at this time.     - Low threshold to engage psychiatry and/or resume Remeron if indicated      Refusal of blood transfusions as patient is Christianity  Noted. No concerns for blood loss at this time, do not anticipate need for transfusion       Emphysema of lung  Mild diffuse emphysematous changes noted on Chest CT 2020. Has never smoked tobacco. CXR on admission stable.  - Consider addition of albuterol nebs/breathing treatments if indicated          CKD (chronic kidney disease), stage III  Creatinine 3.7 on admit, baseline around 1.1  GFR on admit 13.5, baseline around 50    Plan:   Lab Results   Component Value Date    CREATININE 1.1 09/12/2023     - See ODESSA  - Avoid nephrotoxic agents such as NSAIDs, gadolinium and IV radiocontrast.  - Renally dose meds to current GFR.  - Maintain MAP > 65.        Nephrostomy status  She is established and follows with outpatient urology. Her nephrostomy tube was last exchanged in June. She was treated with an empiric course of Cefdinir  7/26, which she states she recently completed.  - See pyelitis  - Urology consult --> non urgent replacement of L nephrostomy, IR completed procedure 9/11/23  - nephrostomy with CYU today  - added scheduled tylenol 650mg q6h for pain       QT prolongation  QTc on admission 451 ms.   Documented QTc of 605 ms in April 2021.   Cautious utilization of QT prolonging medications.       Presence of urostomy  See pyelitis      History of DVT (deep vein thrombosis)  History of DVT in 2020, likely provoked due to prolonged/recurrent hospitalizations. Previously treated with Eliquis, no longer taking.     - DVT ppx w heparin  - held for IR procedure 9/11/23, restarted that evening      Ileostomy in place  See pyelitis      Anemia due to chronic kidney disease  Baseline Hgb ~10-11. WNL at 15 on admission  - Daily CBC       ODESSA (acute kidney injury)  Patient with acute kidney injury/acute renal failure. Differential is broad, consider post-renal due to known history of ureteral obstruction. Consider pre-renal/intrarenal in setting of active infection, though she is currently hemodynamically stable and withought leukocytosis, decreasing concern for sepsis/hypoperfusion. Not currently taking any nephrotoxic medications.   ODESSA is currently stable. Baseline creatinine 1.1 - Labs reviewed- Renal function/electrolytes with Estimated Creatinine Clearance: 56.8 mL/min (based on SCr of 1.1 mg/dL). according to latest data. Monitor urine output and serial BMP and adjust therapy as needed. Avoid nephrotoxins and renally dose meds for GFR listed above.    CT AP w continued moderate R sided hydronephrosis w mild wall thickening of R prox ureter; L sided percutaneous nephrostomy tube in place; no L sided hydro    Plan:  - Will defer RP US at this time given visualization of kidneys on CT  - F/u urine lytes  - S/p 500cc bolus in ED, will administer another 500cc  - L nephrostomy replaced today 9/11/23  - Cr improving, back at baseline this  "morning (1.1)  - replace serum lytes PRN  - encourage OOB      Seizure-like activity  Per PCP note in April: "Had not seizure in a long time - - Sounds like Medication related   No vascular stenting"  - Noted, carbapenems lower seizure threshold, however at this time benefits of treatment appear to outweigh risks. Has tolerated ertapenem in the past.       History of cervical cancer  History of cervical cancer w mets to LN, s/p chemo and XRT.  Previously followed with Dr. Lemon in gyn/onc, last visit Dec 2020. Was supposed to RTC in 3 months, however appears to have been lost to follow up.   Recommend referral for continued outpatient maintenance at time of discharge.       History of essential hypertension  History of htn per chart review, does not currently take any outpatient antihypertensives  - Continue to monitor        VTE Risk Mitigation (From admission, onward)         Ordered     heparin (porcine) injection 5,000 Units  Every 8 hours         09/11/23 1521     IP VTE HIGH RISK PATIENT  Once         09/09/23 2045     Place sequential compression device  Until discontinued         09/09/23 2045                Discharge Planning   OK: 9/13/2023     Code Status: Full Code   Is the patient medically ready for discharge?: No    Reason for patient still in hospital (select all that apply): Patient trending condition, Treatment and Consult recommendations  Discharge Plan A: Home with family                  Dana Padilla MD  Department of Hospital Medicine   [facility]    " Vascular Cardiology  Consult Maria Antoniajayshree     [place] [id number]              EMAIL [email or web address]   OFFICE [phone]    CC:    LE edema, NIELSON    HPI:    58 year-old woman with PMH of COPD on home O2 3L, HTN, HLD, CAD s/p 6 stents, Type 2 DM, PVD, p/w progressive worsening dyspnea x 2 weeks c/w COPD exacerbation not responsive to oral prednisone, now requiring IV steroids and bronchodilators. Consulted for bilateral LE edema. Patient reports that she typically has LE edema at home however it usually improves with leg elevation however for the past 3 days it has not improved with elevation. Had LE dopplers without DVT. Patient is still SOB however improved since admission, awaiting TTE today. Patient has never been told she has HF.        Allergies    No Known Allergies    Intolerances    	    MEDICATIONS:  aspirin enteric coated 81 milliGRAM(s) Oral daily  enoxaparin Injectable 40 milliGRAM(s) SubCutaneous every 24 hours  isosorbide   mononitrate ER Tablet (IMDUR) 30 milliGRAM(s) Oral daily    clarithromycin 500 milliGRAM(s) Oral two times a day  nystatin    Suspension 151278 Unit(s) Oral four times a day    ALBUTerol/ipratropium for Nebulization 3 milliLiter(s) Nebulizer <User Schedule>  buDESOnide   0.5 milliGRAM(s) Respule 0.5 milliGRAM(s) Inhalation every 12 hours  montelukast 10 milliGRAM(s) Oral daily  theophylline ER Capsule 400 milliGRAM(s) Oral daily        dextrose 40% Gel 15 Gram(s) Oral once PRN  dextrose 50% Injectable 12.5 Gram(s) IV Push once  dextrose 50% Injectable 25 Gram(s) IV Push once  dextrose 50% Injectable 25 Gram(s) IV Push once  glucagon  Injectable 1 milliGRAM(s) IntraMuscular once PRN  insulin glargine Injectable (LANTUS) 17 Unit(s) SubCutaneous at bedtime  insulin lispro (HumaLOG) corrective regimen sliding scale   SubCutaneous three times a day before meals  insulin lispro (HumaLOG) corrective regimen sliding scale   SubCutaneous at bedtime  insulin lispro Injectable (HumaLOG) 10 Unit(s) SubCutaneous before dinner  insulin lispro Injectable (HumaLOG) 8 Unit(s) SubCutaneous before breakfast  insulin lispro Injectable (HumaLOG) 8 Unit(s) SubCutaneous before lunch  methylPREDNISolone sodium succinate Injectable 20 milliGRAM(s) IV Push every 6 hours  rosuvastatin 10 milliGRAM(s) Oral at bedtime    artificial tears (preservative free) Ophthalmic Solution 1 Drop(s) Both EYES three times a day  cholecalciferol 2000 Unit(s) Oral daily  dextrose 5%. 1000 milliLiter(s) IV Continuous <Continuous>  multivitamin 1 Tablet(s) Oral daily  sodium chloride 0.65% Nasal 1 Spray(s) Both Nostrils two times a day PRN      PAST MEDICAL & SURGICAL HISTORY:  Hyperlipemia  Chronic sinusitis  Raynaud phenomenon  HTN (hypertension)  DM (diabetes mellitus)  Claustrophobia  COPD (chronic obstructive pulmonary disease)  S/P tonsillectomy      FAMILY HISTORY:  FH: MI (myocardial infarction) (Father)  FH: CABG (coronary artery bypass surgery) (Father)      SOCIAL HISTORY:  unchanged    REVIEW OF SYSTEMS:  CONSTITUTIONAL: No fever, weight loss, or fatigue  EYES: No eye pain, visual disturbances, or discharge  ENMT:  No difficulty hearing, tinnitus, vertigo; No sinus or throat pain  NECK: No pain or stiffness  RESPIRATORY: No cough, wheezing, chills or hemoptysis; +Shortness of Breath    CARDIOVASCULAR: No chest pain, palpitations, passing out, dizziness, or + leg swelling    GASTROINTESTINAL: No abdominal or epigastric pain. No nausea, vomiting, or hematemesis; No diarrhea or constipation. No melena or hematochezia.  GENITOURINARY: No dysuria, frequency, hematuria, or incontinence  NEUROLOGICAL: No headaches, memory loss, loss of strength, numbness, or tremors  SKIN: No itching, burning, rashes, or lesions   LYMPH Nodes: No enlarged glands  ENDOCRINE: No heat or cold intolerance; No hair loss  MUSCULOSKELETAL: No joint pain or swelling; No muscle, back, or extremity pain  PSYCHIATRIC: No depression, anxiety, mood swings, or difficulty sleeping  HEME/LYMPH: No easy bruising, or bleeding gums  ALLERY AND IMMUNOLOGIC: No hives or eczema	    [ x] All others negative	  [ ] Unable to obtain    PHYSICAL EXAM:  T(C): 36.7 (12-07-18 @ 13:34), Max: 36.7 (12-07-18 @ 13:34)  HR: 92 (12-07-18 @ 13:34) (84 - 94)  BP: 137/74 (12-07-18 @ 13:34) (137/74 - 147/89)  RR: 18 (12-07-18 @ 13:34) (18 - 18)  SpO2: 98% (12-07-18 @ 13:34) (96% - 98%)  Wt(kg): --  I&O's Summary    06 Dec 2018 07:01  -  07 Dec 2018 07:00  --------------------------------------------------------  IN: 1320 mL / OUT: 0 mL / NET: 1320 mL        Appearance: Normal	  HEENT:   Normal oral mucosa, PERRL, EOMI	  Carotid:   Right:  No Bruit      Left:  No bruit  Cardiovascular: Normal S1 S2, No JVD, No murmurs  Respiratory: Mild expiratory wheezes bilatarlly  Psychiatry: A & O x 3, Mood & affect appropriate  Gastrointestinal:  Soft, Non-tender, + BS	  Skin: No rashes, No ecchymoses, No cyanosis	  Neurologic: Non-focal  Extremities: Normal range of motion. 2+ BLE edema up to thighs    Vascular Pulse Exam:       Right Popliteal:  Palpable      Left Popliteal:  Palpable  Right DP:  Palpable                 Left DP:  Palpable  Right PT:  Palpable                 Left DP:  Palpable    Foot Exam:  Warm, no ulceration.        LABS:	 	    CBC Full  -  ( 06 Dec 2018 11:23 )  WBC Count : 12.5 K/uL  Hemoglobin : 14.0 g/dL  Hematocrit : 40.4 %  Platelet Count - Automated : 273 K/uL  Mean Cell Volume : 88.8 fl  Mean Cell Hemoglobin : 30.7 pg  Mean Cell Hemoglobin Concentration : 34.6 gm/dL  Auto Neutrophil # : x  Auto Lymphocyte # : x  Auto Monocyte # : x  Auto Eosinophil # : x  Auto Basophil # : x  Auto Neutrophil % : x  Auto Lymphocyte % : x  Auto Monocyte % : x  Auto Eosinophil % : x  Auto Basophil % : x    12-07    140  |  94<L>  |  33<H>  ----------------------------<  208<H>  5.8<H>   |  38<H>  |  1.08  12-06    136  |  91<L>  |  33<H>  ----------------------------<  301<H>  5.2   |  36<H>  |  1.05    Ca    9.9      07 Dec 2018 07:12  Ca    9.6      06 Dec 2018 11:20            Assessment:  1. Bilateral LE edema- no DVTs on LE doppler, could be 2/2 to prolonged steroid use, awaiting TTE  2. COPD exacerbation  3. CAD   4. HTN  5. HLD           Plan:  1. Compression stockings  2. F/u TTE     Eliezer Flores MD  Cardiology Fellow PGY-5  [id number]     Thank you      Vascular Cardiology Service    Please call with any questions:   [facility] - [phone]  Office [phone]  email:  [email or web address]

## 2023-09-12 NOTE — ASSESSMENT & PLAN NOTE
History of DVT in 2020, likely provoked due to prolonged/recurrent hospitalizations. Previously treated with Eliquis, no longer taking.     - DVT ppx w heparin  - held for IR procedure 9/11/23, restarted that evening

## 2023-09-12 NOTE — PLAN OF CARE
[facility]  Discharge Reassessment    Primary Care Provider: No, Primary Doctor    Expected Discharge Date: 9/13/2023    Reassessment (most recent)       Discharge Reassessment - 09/12/23 1428          Discharge Reassessment    Assessment Type Discharge Planning Reassessment     Did the patient's condition or plan change since previous assessment? No     Discharge Plan discussed with: Patient     Communicated OK with patient/caregiver Yes     Discharge Plan A Home Health     Discharge Plan B Home with family     DME Needed Upon Discharge  none     Transition of Care Barriers None     Why the patient remains in the hospital Requires continued medical care        Post-Acute Status    Post-Acute Authorization IV Infusion;Home Health     Home Health Status Referrals Sent     IV Infusion Status Referral(s) sent     Hospital Resources/Appts/Education Provided Post-Acute resouces added to AVS;Appointments scheduled and added to AVS     Discharge Delays None known at this time

## 2023-09-12 NOTE — SUBJECTIVE & OBJECTIVE
Interval History: NAEON. S/P left percutaneous nephrostomy tube change. Reports pain/soreness. Poor appetite. Asking for protein shakes with meals. Cx + GNR, E. Cloecae and E. Faecium.    Review of Systems   Constitutional:  Positive for activity change, appetite change and fatigue. Negative for chills.   HENT: Negative.     Eyes: Negative.    Respiratory:  Negative for shortness of breath and wheezing.    Cardiovascular:  Negative for chest pain and leg swelling.   Gastrointestinal:  Positive for abdominal pain and nausea. Negative for abdominal distention, blood in stool, constipation and diarrhea.   Genitourinary:  Negative for decreased urine volume, difficulty urinating, hematuria and pelvic pain.        Left nephrostomy tube      Musculoskeletal:  Negative for back pain.   Skin:  Negative for wound.   Neurological:  Negative for dizziness, syncope and headaches.   Psychiatric/Behavioral:  Negative for agitation. The patient is not nervous/anxious.      Objective:     Vital Signs (Most Recent):  Temp: 97.6 °F (36.4 °C) (09/12/23 1541)  Pulse: 65 (09/12/23 1541)  Resp: 18 (09/12/23 1541)  BP: (!) 116/56 (09/12/23 1541)  SpO2: 100 % (09/12/23 1541) Vital Signs (24h Range):  Temp:  [97.4 °F (36.3 °C)-98.2 °F (36.8 °C)] 97.6 °F (36.4 °C)  Pulse:  [65-81] 65  Resp:  [18] 18  SpO2:  [99 %-100 %] 100 %  BP: ()/(53-61) 116/56     Weight: 70.8 kg (156 lb)  Body mass index is 23.04 kg/m².    Estimated Creatinine Clearance: 56.8 mL/min (based on SCr of 1.1 mg/dL).     Physical Exam  Vitals and nursing note reviewed.   Constitutional:       General: She is not in acute distress.     Appearance: She is ill-appearing (chronically ill appearing). She is not toxic-appearing or diaphoretic.   HENT:      Head: Normocephalic and atraumatic.      Mouth/Throat:      Mouth: Mucous membranes are moist.   Eyes:      General: No scleral icterus.     Conjunctiva/sclera: Conjunctivae normal.   Cardiovascular:      Rate and Rhythm:  Normal rate and regular rhythm.      Heart sounds: No murmur heard.  Pulmonary:      Effort: Pulmonary effort is normal. No respiratory distress.      Breath sounds: Normal breath sounds.   Abdominal:      General: There is no distension.      Palpations: Abdomen is soft.      Tenderness: There is abdominal tenderness. There is right CVA tenderness. There is no left CVA tenderness.      Comments: Ileostomy in place   Urostomy - yellow urine   Genitourinary:     Comments: Left Nephrostomy in place - clear urine  Musculoskeletal:      Right lower leg: No edema.      Left lower leg: No edema.   Skin:     General: Skin is warm and dry.   Neurological:      Mental Status: She is alert and oriented to person, place, and time.   Psychiatric:         Mood and Affect: Mood normal.         Behavior: Behavior normal.          Significant Labs: CBC:   Recent Labs   Lab 09/11/23 0612 09/11/23 1921 09/12/23  0704   WBC 5.99 6.28 4.23   HGB 13.3 10.8* 11.3*   HCT 43.4 35.3* 36.5*    262 265       CMP:   Recent Labs   Lab 09/11/23 0612 09/11/23 1921 09/12/23  0704    143 142   K 3.8 3.5 3.5   * 113* 115*   CO2 17* 21* 18*    94 88   BUN 49* 33* 23*   CREATININE 1.9* 1.3 1.1   CALCIUM 9.3 8.7 8.5*   PROT 7.9 6.9 6.4   ALBUMIN 3.1* 2.8* 2.6*   BILITOT 0.5 0.4 0.4   ALKPHOS 111 97 92   AST 27 30 48*   ALT 24 25 31   ANIONGAP 11 9 9       Microbiology Results (last 7 days)       Procedure Component Value Units Date/Time    Urine culture [[id number]]  (Abnormal) Collected: 09/11/23 1226    Order Status: Completed Specimen: Urine, Nephrostomy Tube, Left Updated: 09/12/23 1153     Urine Culture, Routine ENTEROCOCCUS FAECIUM  10,000 - 49,999 cfu/ml  Susceptibility pending  No other significant isolate      Narrative:      Indicated criteria for high risk culture:->Prior to urologic  procedures    Urine culture [[id number]]  (Abnormal)  (Susceptibility) Collected: 09/09/23 1416    Order Status: Completed  Specimen: Urine Updated: 09/12/23 0801     Urine Culture, Routine ENTEROBACTER CLOACAE  >100,000 cfu/ml        GRAM NEGATIVE BALAJI  50,000 - 99,999 cfu/ml  Identification and susceptibility pending      Narrative:      Specimen Source->Urine    Blood Culture #2 **CANNOT BE ORDERED STAT** [[id number]] Collected: 09/09/23 1806    Order Status: Completed Specimen: Blood from Peripheral, Hand, Left Updated: 09/11/23 2012     Blood Culture, Routine No Growth to date      No Growth to date      No Growth to date    Blood Culture #1 **CANNOT BE ORDERED STAT** [[id number]] Collected: 09/09/23 1807    Order Status: Completed Specimen: Blood from Peripheral, Hand, Right Updated: 09/11/23 2012     Blood Culture, Routine No Growth to date      No Growth to date      No Growth to date    Urine culture [[id number]]     Order Status: Canceled Specimen: Urine, Nephrostomy Tube, Left           Recent Lab Results  (Last 5 results in the past 24 hours)        09/12/23  1531   09/12/23  1046   09/12/23  0704   09/12/23  0620   09/11/23  2351        Albumin     2.6           Alkaline Phosphatase     92           ALT     31           Anion Gap     9           AST     48           Baso #     0.03           Basophil %     0.7           BILIRUBIN TOTAL     0.4  Comment: For infants and newborns, interpretation of results should be based  on gestational age, weight and in agreement with clinical  observations.    Premature Infant recommended reference ranges:  Up to 24 hours.............<8.0 mg/dL  Up to 48 hours............<12.0 mg/dL  3-5 days..................<15.0 mg/dL  6-29 days.................<15.0 mg/dL             BUN     23           Calcium     8.5           Chloride     115           CO2     18           Creatinine     1.1           Differential Method     Automated           eGFR     57.5           Eos #     0.1           Eosinophil %     1.7           Glucose     88           Gran # (ANC)     2.1           Gran %     49.4            Hematocrit     36.5           Hemoglobin     11.3           Immature Grans (Abs)     0.01  Comment: Mild elevation in immature granulocytes is non specific and   can be seen in a variety of conditions including stress response,   acute inflammation, trauma and pregnancy. Correlation with other   laboratory and clinical findings is essential.             Immature Granulocytes     0.2           Lymph #     1.2           Lymph %     27.9           Magnesium      1.8           MCH     26.6           MCHC     31.0           MCV     86           Mono #     0.9           Mono %     20.1           MPV     10.7           nRBC     0           Phosphorus     1.9           Platelets     265           POCT Glucose 99   101     99   98       Potassium     3.5           PROTEIN TOTAL     6.4           RBC     4.25           RDW     15.0           Sodium     142           WBC     4.23                                  Significant Imaging:   Imaging Results              CT Abdomen Pelvis  Without Contrast (Final result)  Result time 09/09/23 20:07:17      Final result by Vinayak Baer DO (09/09/23 20:07:17)                   Impression:      1. Postoperative changes of urinary diversion with a colon conduit and left lower quadrant colostomy.  Continued moderate right-sided hydronephrosis with mild wall thickening of the right proximal ureter.  Recommend correlation with urinalysis to exclude pyelitis.  2. Left-sided percutaneous nephrostomy tube in place.  No left-sided hydronephrosis.  3. Postoperative changes of partial colectomy with a right lower quadrant ileostomy.  No bowel obstruction or parastomal hernia.      Electronically signed by: Vinayak Baer  Date:    09/09/2023  Time:    20:07               Narrative:    EXAMINATION:  CT ABDOMEN PELVIS WITHOUT CONTRAST    CLINICAL HISTORY:  Abdominal abscess/infection suspected;Nausea/vomiting;Bowel obstruction suspected;    TECHNIQUE:  Multiplanar images were obtained of the  abdomen and pelvis from the hemidiaphragms through the symphysis pubis without intravenous contrast.    COMPARISON:  CT of the abdomen and pelvis from 08/18/2023.    FINDINGS:  Lung Bases: Clear.    Heart: Heart size is normal.  No pericardial effusion.    Liver: Normal in size and attenuation without focal hepatic lesion.    Biliary tract: There is mild prominence of the common bile duct, stable from prior and likely related to cholecystectomy changes.  No intrahepatic biliary ductal a shin.    Gallbladder: Surgically absent.    Pancreas: Normal. No pancreatic ductal dilatation.    Spleen: Normal size without focal lesion.    Adrenals: Normal.    Kidneys and urinary collecting systems: There are postoperative changes of urinary diversion with a colon conduit and left lower quadrant colostomy.  There is a left-sided percutaneous nephrostomy tube with the pigtail in the renal collecting system.  There is no left-sided hydronephrosis.  There is a moderate right-sided hydronephrosis, relatively stable in comparison with prior.    Lymph nodes: None enlarged.    Stomach and bowel: The stomach is normal.  There are postop changes of partial colectomy with a right lower quadrant and ileostomy.  There is no bowel obstruction.  There is no peristomal hernia.    Peritoneum and mesentery: No ascites or free intraperitoneal air. No abdominal fluid collection.  Multiple surgical clips noted in the pelvis and lower abdomen.    Vasculature: Normal.    Urinary bladder: The urinary bladder is contracted    Reproductive organs: The uterus is absent.    Body wall: No abnormality.    Musculoskeletal: No aggressive osseous lesion.                        Final result by Vinayak Baer DO (09/09/23 20:07:17)                   Impression:      1. Postoperative changes of urinary diversion with a colon conduit and left lower quadrant colostomy.  Continued moderate right-sided hydronephrosis with mild wall thickening of the right  proximal ureter.  Recommend correlation with urinalysis to exclude pyelitis.  2. Left-sided percutaneous nephrostomy tube in place.  No left-sided hydronephrosis.  3. Postoperative changes of partial colectomy with a right lower quadrant ileostomy.  No bowel obstruction or parastomal hernia.      Electronically signed by: Vinayak Baer  Date:    09/09/2023  Time:    20:07               Narrative:    EXAMINATION:  CT ABDOMEN PELVIS WITHOUT CONTRAST    CLINICAL HISTORY:  Abdominal abscess/infection suspected;Nausea/vomiting;Bowel obstruction suspected;    TECHNIQUE:  Multiplanar images were obtained of the abdomen and pelvis from the hemidiaphragms through the symphysis pubis without intravenous contrast.    COMPARISON:  CT of the abdomen and pelvis from 08/18/2023.    FINDINGS:  Lung Bases: Clear.    Heart: Heart size is normal.  No pericardial effusion.    Liver: Normal in size and attenuation without focal hepatic lesion.    Biliary tract: There is mild prominence of the common bile duct, stable from prior and likely related to cholecystectomy changes.  No intrahepatic biliary ductal a shin.    Gallbladder: Surgically absent.    Pancreas: Normal. No pancreatic ductal dilatation.    Spleen: Normal size without focal lesion.    Adrenals: Normal.    Kidneys and urinary collecting systems: There are postoperative changes of urinary diversion with a colon conduit and left lower quadrant colostomy.  There is a left-sided percutaneous nephrostomy tube with the pigtail in the renal collecting system.  There is no left-sided hydronephrosis.  There is a moderate right-sided hydronephrosis, relatively stable in comparison with prior.    Lymph nodes: None enlarged.    Stomach and bowel: The stomach is normal.  There are postop changes of partial colectomy with a right lower quadrant and ileostomy.  There is no bowel obstruction.  There is no peristomal hernia.    Peritoneum and mesentery: No ascites or free intraperitoneal  air. No abdominal fluid collection.  Multiple surgical clips noted in the pelvis and lower abdomen.    Vasculature: Normal.    Urinary bladder: The urinary bladder is contracted    Reproductive organs: The uterus is absent.    Body wall: No abnormality.    Musculoskeletal: No aggressive osseous lesion.

## 2023-09-12 NOTE — ASSESSMENT & PLAN NOTE
Patient with acute kidney injury/acute renal failure. Differential is broad, consider post-renal due to known history of ureteral obstruction. Consider pre-renal/intrarenal in setting of active infection, though she is currently hemodynamically stable and withought leukocytosis, decreasing concern for sepsis/hypoperfusion. Not currently taking any nephrotoxic medications.   ODESSA is currently stable. Baseline creatinine 1.1 - Labs reviewed- Renal function/electrolytes with Estimated Creatinine Clearance: 56.8 mL/min (based on SCr of 1.1 mg/dL). according to latest data. Monitor urine output and serial BMP and adjust therapy as needed. Avoid nephrotoxins and renally dose meds for GFR listed above.    CT AP w continued moderate R sided hydronephrosis w mild wall thickening of R prox ureter; L sided percutaneous nephrostomy tube in place; no L sided hydro    Plan:  - Will defer RP US at this time given visualization of kidneys on CT  - F/u urine lytes  - S/p 500cc bolus in ED, will administer another 500cc  - L nephrostomy replaced today 9/11/23  - Cr improving, back at baseline this morning (1.1)  - replace serum lytes PRN  - encourage OOB

## 2023-09-12 NOTE — PROGRESS NOTES
[facility]  Infectious Disease  Progress Note    Patient Name: Edita Riley  MRN: [id number]  Admission Date: 9/9/2023  Length of Stay: 3 days  Attending Physician: Stephanie Rosa MD  Primary Care Provider: No, Primary Doctor    Isolation Status: No active isolations  Assessment/Plan:      Renal/  * Pyelitis     58 year old woman with hx of cervical CA s/p chemotherapy and XRT c/b bilateral ureteral strictures requiring bilateral nephrostomy tubes (currently left tube remains only; right removed in April ), s/p urinary to colon conduit 2020 c/b bowel perforation s/p right hemicolectomy and ostomy and recurrent MDR infections , QTc prolongation, PTSD, major depression who presented with fatigue and abdominal pain X 2 weeks.  Noted to have ODESSA (creatinine 3.7, baseline 1.1), U/A >100 WBC. CT A/P with right sided hydronephrosis with thickening of the proximal right ureter and concern for pyelitis. Left NT shown to be in good position.  ID consulted for pyelitis.       Complicated urologic history.  Most recently had antegrade nephrostogram and loopogram 6/6/223 which showed reflux on the right  And left distal ureteral stricture.  Left nephrostomy tube last changed on 6/7.  Urology consulted. Reviewed CT and determined right hydronephrosis stable.  No further intervention recommended at this time. Went for left percutaneous nephrostomy tube change 9/11 with IR.      She is currently on IV ertapenem given hx of MDR UTI. Blood cultures NGTD. Urine culture + GNR, E. cloacae and E. faceium.    Recommendations  · Continue IV Ertapenem  · Start IV Daptomycin for E. faceium given hx of VRE. CPK ordered.  · Follow up urine culture data and adjust antibiotics accordingly  · Discussed plan with ID staff and HM team. Will follow up tomorrow.         Thank you for the consult. Please secure chat for any questions.  Megha Zhang PA-C    Subjective:     Principal Problem:Pyelitis    HPI: Edita  Rosemary is a 58 year old woman with hx of cervical CA c/b bilateral ureteral strictures requiring bilateral nephrostomy tubes (currently left tube remains only), s/p urinary to colon conduit 2020 c/b bowel perforation s/p right hemicolectomy and ostomy, recurrent MDR infections , QTc prolongation, PTSD, major depression who presented for worsening fatigue and abdominal pain.  In ED afebrile, WBC 11.  Noted to have ODESSA (creatinine 3.7, baseline 1.1), U/A >100 WBC (unclear source).  CT A/P with right sided hydronephrosis with thickening of the proximal right ureter and concern for pyelitis.  ID consulted for pyelitis.      Blood cultures NGTD. Urine culture pending. VSS.  She is currently on IV ertapenem.     Interval History: NAEON. S/P left percutaneous nephrostomy tube change. Reports pain/soreness. Poor appetite. Asking for protein shakes with meals. Cx + GNR, E. Cloecae and E. Faecium.    Review of Systems   Constitutional:  Positive for activity change, appetite change and fatigue. Negative for chills.   HENT: Negative.     Eyes: Negative.    Respiratory:  Negative for shortness of breath and wheezing.    Cardiovascular:  Negative for chest pain and leg swelling.   Gastrointestinal:  Positive for abdominal pain and nausea. Negative for abdominal distention, blood in stool, constipation and diarrhea.   Genitourinary:  Negative for decreased urine volume, difficulty urinating, hematuria and pelvic pain.        Left nephrostomy tube      Musculoskeletal:  Negative for back pain.   Skin:  Negative for wound.   Neurological:  Negative for dizziness, syncope and headaches.   Psychiatric/Behavioral:  Negative for agitation. The patient is not nervous/anxious.      Objective:     Vital Signs (Most Recent):  Temp: 97.6 °F (36.4 °C) (09/12/23 1541)  Pulse: 65 (09/12/23 1541)  Resp: 18 (09/12/23 1541)  BP: (!) 116/56 (09/12/23 1541)  SpO2: 100 % (09/12/23 1541) Vital Signs (24h Range):  Temp:  [97.4 °F (36.3 °C)-98.2  °F (36.8 °C)] 97.6 °F (36.4 °C)  Pulse:  [65-81] 65  Resp:  [18] 18  SpO2:  [99 %-100 %] 100 %  BP: ()/(53-61) 116/56     Weight: 70.8 kg (156 lb)  Body mass index is 23.04 kg/m².    Estimated Creatinine Clearance: 56.8 mL/min (based on SCr of 1.1 mg/dL).     Physical Exam  Vitals and nursing note reviewed.   Constitutional:       General: She is not in acute distress.     Appearance: She is ill-appearing (chronically ill appearing). She is not toxic-appearing or diaphoretic.   HENT:      Head: Normocephalic and atraumatic.      Mouth/Throat:      Mouth: Mucous membranes are moist.   Eyes:      General: No scleral icterus.     Conjunctiva/sclera: Conjunctivae normal.   Cardiovascular:      Rate and Rhythm: Normal rate and regular rhythm.      Heart sounds: No murmur heard.  Pulmonary:      Effort: Pulmonary effort is normal. No respiratory distress.      Breath sounds: Normal breath sounds.   Abdominal:      General: There is no distension.      Palpations: Abdomen is soft.      Tenderness: There is abdominal tenderness. There is right CVA tenderness. There is no left CVA tenderness.      Comments: Ileostomy in place   Urostomy - yellow urine   Genitourinary:     Comments: Left Nephrostomy in place - clear urine  Musculoskeletal:      Right lower leg: No edema.      Left lower leg: No edema.   Skin:     General: Skin is warm and dry.   Neurological:      Mental Status: She is alert and oriented to person, place, and time.   Psychiatric:         Mood and Affect: Mood normal.         Behavior: Behavior normal.          Significant Labs: CBC:   Recent Labs   Lab 09/11/23  0612 09/11/23 1921 09/12/23  0704   WBC 5.99 6.28 4.23   HGB 13.3 10.8* 11.3*   HCT 43.4 35.3* 36.5*    262 265       CMP:   Recent Labs   Lab 09/11/23 0612 09/11/23 1921 09/12/23  0704    143 142   K 3.8 3.5 3.5   * 113* 115*   CO2 17* 21* 18*    94 88   BUN 49* 33* 23*   CREATININE 1.9* 1.3 1.1   CALCIUM 9.3 8.7  8.5*   PROT 7.9 6.9 6.4   ALBUMIN 3.1* 2.8* 2.6*   BILITOT 0.5 0.4 0.4   ALKPHOS 111 97 92   AST 27 30 48*   ALT 24 25 31   ANIONGAP 11 9 9       Microbiology Results (last 7 days)       Procedure Component Value Units Date/Time    Urine culture [[id number]]  (Abnormal) Collected: 09/11/23 1226    Order Status: Completed Specimen: Urine, Nephrostomy Tube, Left Updated: 09/12/23 1153     Urine Culture, Routine ENTEROCOCCUS FAECIUM  10,000 - 49,999 cfu/ml  Susceptibility pending  No other significant isolate      Narrative:      Indicated criteria for high risk culture:->Prior to urologic  procedures    Urine culture [[id number]]  (Abnormal)  (Susceptibility) Collected: 09/09/23 1416    Order Status: Completed Specimen: Urine Updated: 09/12/23 0801     Urine Culture, Routine ENTEROBACTER CLOACAE  >100,000 cfu/ml        GRAM NEGATIVE BALAJI  50,000 - 99,999 cfu/ml  Identification and susceptibility pending      Narrative:      Specimen Source->Urine    Blood Culture #2 **CANNOT BE ORDERED STAT** [[id number]] Collected: 09/09/23 1806    Order Status: Completed Specimen: Blood from Peripheral, Hand, Left Updated: 09/11/23 2012     Blood Culture, Routine No Growth to date      No Growth to date      No Growth to date    Blood Culture #1 **CANNOT BE ORDERED STAT** [[id number]] Collected: 09/09/23 1807    Order Status: Completed Specimen: Blood from Peripheral, Hand, Right Updated: 09/11/23 2012     Blood Culture, Routine No Growth to date      No Growth to date      No Growth to date    Urine culture [[id number]]     Order Status: Canceled Specimen: Urine, Nephrostomy Tube, Left           Recent Lab Results  (Last 5 results in the past 24 hours)        09/12/23  1531   09/12/23  1046   09/12/23  0704   09/12/23  0620   09/11/23  2351        Albumin     2.6           Alkaline Phosphatase     92           ALT     31           Anion Gap     9           AST     48           Baso #     0.03           Basophil %     0.7            BILIRUBIN TOTAL     0.4  Comment: For infants and newborns, interpretation of results should be based  on gestational age, weight and in agreement with clinical  observations.    Premature Infant recommended reference ranges:  Up to 24 hours.............<8.0 mg/dL  Up to 48 hours............<12.0 mg/dL  3-5 days..................<15.0 mg/dL  6-29 days.................<15.0 mg/dL             BUN     23           Calcium     8.5           Chloride     115           CO2     18           Creatinine     1.1           Differential Method     Automated           eGFR     57.5           Eos #     0.1           Eosinophil %     1.7           Glucose     88           Gran # (ANC)     2.1           Gran %     49.4           Hematocrit     36.5           Hemoglobin     11.3           Immature Grans (Abs)     0.01  Comment: Mild elevation in immature granulocytes is non specific and   can be seen in a variety of conditions including stress response,   acute inflammation, trauma and pregnancy. Correlation with other   laboratory and clinical findings is essential.             Immature Granulocytes     0.2           Lymph #     1.2           Lymph %     27.9           Magnesium      1.8           MCH     26.6           MCHC     31.0           MCV     86           Mono #     0.9           Mono %     20.1           MPV     10.7           nRBC     0           Phosphorus     1.9           Platelets     265           POCT Glucose 99   101     99   98       Potassium     3.5           PROTEIN TOTAL     6.4           RBC     4.25           RDW     15.0           Sodium     142           WBC     4.23                                  Significant Imaging:   Imaging Results              CT Abdomen Pelvis  Without Contrast (Final result)  Result time 09/09/23 20:07:17      Final result by Vinayak Baer DO (09/09/23 20:07:17)                   Impression:      1. Postoperative changes of urinary diversion with a colon conduit and left  lower quadrant colostomy.  Continued moderate right-sided hydronephrosis with mild wall thickening of the right proximal ureter.  Recommend correlation with urinalysis to exclude pyelitis.  2. Left-sided percutaneous nephrostomy tube in place.  No left-sided hydronephrosis.  3. Postoperative changes of partial colectomy with a right lower quadrant ileostomy.  No bowel obstruction or parastomal hernia.      Electronically signed by: Vinayak Baer  Date:    09/09/2023  Time:    20:07               Narrative:    EXAMINATION:  CT ABDOMEN PELVIS WITHOUT CONTRAST    CLINICAL HISTORY:  Abdominal abscess/infection suspected;Nausea/vomiting;Bowel obstruction suspected;    TECHNIQUE:  Multiplanar images were obtained of the abdomen and pelvis from the hemidiaphragms through the symphysis pubis without intravenous contrast.    COMPARISON:  CT of the abdomen and pelvis from 08/18/2023.    FINDINGS:  Lung Bases: Clear.    Heart: Heart size is normal.  No pericardial effusion.    Liver: Normal in size and attenuation without focal hepatic lesion.    Biliary tract: There is mild prominence of the common bile duct, stable from prior and likely related to cholecystectomy changes.  No intrahepatic biliary ductal a shin.    Gallbladder: Surgically absent.    Pancreas: Normal. No pancreatic ductal dilatation.    Spleen: Normal size without focal lesion.    Adrenals: Normal.    Kidneys and urinary collecting systems: There are postoperative changes of urinary diversion with a colon conduit and left lower quadrant colostomy.  There is a left-sided percutaneous nephrostomy tube with the pigtail in the renal collecting system.  There is no left-sided hydronephrosis.  There is a moderate right-sided hydronephrosis, relatively stable in comparison with prior.    Lymph nodes: None enlarged.    Stomach and bowel: The stomach is normal.  There are postop changes of partial colectomy with a right lower quadrant and ileostomy.  There is no bowel  obstruction.  There is no peristomal hernia.    Peritoneum and mesentery: No ascites or free intraperitoneal air. No abdominal fluid collection.  Multiple surgical clips noted in the pelvis and lower abdomen.    Vasculature: Normal.    Urinary bladder: The urinary bladder is contracted    Reproductive organs: The uterus is absent.    Body wall: No abnormality.    Musculoskeletal: No aggressive osseous lesion.                        Final result by Vinayak Baer DO (09/09/23 20:07:17)                   Impression:      1. Postoperative changes of urinary diversion with a colon conduit and left lower quadrant colostomy.  Continued moderate right-sided hydronephrosis with mild wall thickening of the right proximal ureter.  Recommend correlation with urinalysis to exclude pyelitis.  2. Left-sided percutaneous nephrostomy tube in place.  No left-sided hydronephrosis.  3. Postoperative changes of partial colectomy with a right lower quadrant ileostomy.  No bowel obstruction or parastomal hernia.      Electronically signed by: Vinayak Baer  Date:    09/09/2023  Time:    20:07               Narrative:    EXAMINATION:  CT ABDOMEN PELVIS WITHOUT CONTRAST    CLINICAL HISTORY:  Abdominal abscess/infection suspected;Nausea/vomiting;Bowel obstruction suspected;    TECHNIQUE:  Multiplanar images were obtained of the abdomen and pelvis from the hemidiaphragms through the symphysis pubis without intravenous contrast.    COMPARISON:  CT of the abdomen and pelvis from 08/18/2023.    FINDINGS:  Lung Bases: Clear.    Heart: Heart size is normal.  No pericardial effusion.    Liver: Normal in size and attenuation without focal hepatic lesion.    Biliary tract: There is mild prominence of the common bile duct, stable from prior and likely related to cholecystectomy changes.  No intrahepatic biliary ductal a shin.    Gallbladder: Surgically absent.    Pancreas: Normal. No pancreatic ductal dilatation.    Spleen: Normal size without  focal lesion.    Adrenals: Normal.    Kidneys and urinary collecting systems: There are postoperative changes of urinary diversion with a colon conduit and left lower quadrant colostomy.  There is a left-sided percutaneous nephrostomy tube with the pigtail in the renal collecting system.  There is no left-sided hydronephrosis.  There is a moderate right-sided hydronephrosis, relatively stable in comparison with prior.    Lymph nodes: None enlarged.    Stomach and bowel: The stomach is normal.  There are postop changes of partial colectomy with a right lower quadrant and ileostomy.  There is no bowel obstruction.  There is no peristomal hernia.    Peritoneum and mesentery: No ascites or free intraperitoneal air. No abdominal fluid collection.  Multiple surgical clips noted in the pelvis and lower abdomen.    Vasculature: Normal.    Urinary bladder: The urinary bladder is contracted    Reproductive organs: The uterus is absent.    Body wall: No abnormality.    Musculoskeletal: No aggressive osseous lesion.

## 2023-09-12 NOTE — PLAN OF CARE
Problem: Violence Risk or Actual  Goal: Anger and Impulse Control  Outcome: Ongoing, Progressing     Problem: Adult Inpatient Plan of Care  Goal: Plan of Care Review  Outcome: Ongoing, Progressing     Problem: Adult Inpatient Plan of Care  Goal: Absence of Hospital-Acquired Illness or Injury  Outcome: Ongoing, Progressing     Problem: Adult Inpatient Plan of Care  Goal: Optimal Comfort and Wellbeing  Outcome: Ongoing, Progressing

## 2023-09-12 NOTE — ASSESSMENT & PLAN NOTE
She is established and follows with outpatient urology. Her nephrostomy tube was last exchanged in June. She was treated with an empiric course of Cefdinir 7/26, which she states she recently completed.  - See pyelitis  - Urology consult --> non urgent replacement of L nephrostomy, IR completed procedure 9/11/23  - nephrostomy with CYU today  - added scheduled tylenol 650mg q6h for pain

## 2023-09-12 NOTE — ASSESSMENT & PLAN NOTE
Creatinine 3.7 on admit, baseline around 1.1  GFR on admit 13.5, baseline around 50    Plan:   Lab Results   Component Value Date    CREATININE 1.1 09/12/2023     - See ODESSA  - Avoid nephrotoxic agents such as NSAIDs, gadolinium and IV radiocontrast.  - Renally dose meds to current GFR.  - Maintain MAP > 65.

## 2023-09-12 NOTE — PLAN OF CARE
09/12/23 1427   Post-Acute Status   Post-Acute Authorization Home Health;IV Infusion   Home Health Status Referrals Sent   IV Infusion Status Referral(s) sent   Hospital Resources/Appts/Education Provided Post-Acute resouces added to AVS;Appointments scheduled and added to AVS   Discharge Delays None known at this time   Discharge Plan   Discharge Plan A Home Health   Discharge Plan B Home with family

## 2023-09-12 NOTE — PROGRESS NOTES
[facility]  Urology  Progress Note    Patient Name: Edita Riley  MRN: [id number]  Admission Date: 9/9/2023  Hospital Length of Stay: 3 days  Code Status: Full Code   Attending Provider: Stephanie Rosa MD   Primary Care Physician: Delma, Primary Doctor    Subjective:     HPI:  Edita Riley is a 58 y.o. female with history of bilateral ureteral stricture secondary to XRT for cervical cancer, s/p open transverse colon conduit urinary diversion on 9/11/2020.  Urology consulted to determine the need for inpatient neph tube exchange.      She is presented to the ED for worsening fatigue and abdominal pain for 2 weeks.  She endorses subjective fevers and chills as well as R flank pain.  No issues with drainage of the L nephrostomy tube and it has continued to drain clear yellow urine.     She has a complicated urologic history and is s/p R PCNL on 4/21.  She passed a clamping trial and was able to have the right nephrostomy tube removed.  The left is still in place, she had an antegrade nephrostogram and a loopogram 6/6/2023 which showed reflux on the right and left distal ureteral stricture beginning at the level of the inferior portion of L4 with no contrast opacifying the ureter past this point. The nephrostomy tube was changed on 6/7/2023.      Cr 2.8 from 3.7 on admission (baseline around 1). WBC 11. Fena pre-renal.  UA with occasional bacteria, > 100 WBCs.  Urine culture in process. CTAP shows stable R hydro as well as L neph tube in appropriate position.  She is on ertapenem.         Interval History: NAEON. AFVSS. Blood culture NGTD. Urine culture GNR and Enterobacter. S/p left nephrostomy tube exchange. Patient reports feeling well.      Objective:     Temp:  [97.3 °F (36.3 °C)-98.3 °F (36.8 °C)] 97.3 °F (36.3 °C)  Pulse:  [75-98] 75  Resp:  [16-18] 18  SpO2:  [97 %-100 %] 100 %  BP: ()/(58-90) 113/63     Body mass index is 23.04 kg/m².           Drains       Drain  Duration                   Urostomy 09/11/20 0000 ileal conduit LLQ 1095 days         Ileostomy 09/18/20 RLQ 1088 days         Nephrostomy 06/07/23 1349 Left 12 Fr. 95 days                     Physical Exam  Vitals reviewed.   Constitutional:       General: She is not in acute distress.     Appearance: She is not toxic-appearing or diaphoretic.   HENT:      Head: Normocephalic and atraumatic.      Nose: Nose normal.   Eyes:      Conjunctiva/sclera: Conjunctivae normal.   Cardiovascular:      Rate and Rhythm: Normal rate.   Pulmonary:      Effort: Pulmonary effort is normal. No respiratory distress.   Abdominal:      General: Abdomen is flat. There is no distension.      Palpations: Abdomen is soft.      Tenderness: There is no right CVA tenderness or left CVA tenderness.      Comments: LLQ Urostomy draining c/y/u. RLQ Ostomy with stool in appliance. Left nephrostomy draining clear yellow urine.    Musculoskeletal:         General: No swelling or deformity. Normal range of motion.      Cervical back: Normal range of motion.   Skin:     General: Skin is warm and dry.      Findings: No erythema.   Neurological:      General: No focal deficit present.      Mental Status: She is alert.      Motor: No weakness.   Psychiatric:         Mood and Affect: Mood normal.         Behavior: Behavior normal.         Thought Content: Thought content normal.           Significant Labs:    BMP:  Recent Labs   Lab 09/09/23  1359 09/10/23  0438   * 140   K 4.3 4.8    111*   CO2 10* 12*   BUN 66* 66*   CREATININE 3.7* 2.8*   CALCIUM 10.2 9.6       CBC:   Recent Labs   Lab 09/09/23  1359 09/10/23  0438   WBC 11.03 11.91   HGB 15.9 14.6   HCT 52.6* 46.3    309       Urine Studies:   Recent Labs   Lab 09/09/23  1416   COLORU Yellow   APPEARANCEUA Ex.Turbid   PHUR 7.0   SPECGRAV 1.015   PROTEINUA 2+*   GLUCUA Negative   KETONESU Negative   BILIRUBINUA Negative   OCCULTUA 1+*   NITRITE Negative   LEUKOCYTESUR 3+*   RBCUA 8*   WBCUA  >100*   BACTERIA Occasional   HYALINECASTS 0     All pertinent labs results from the past 24 hours have been reviewed.    Significant Imaging:  All pertinent imaging results/findings from the past 24 hours have been reviewed.                    Assessment/Plan:     Nephrostomy status  58F with complicated urologic history as above     - S/p left nephrostomy tube exchange on 9/11/23.   - ODESSA resolved with fluids.   - strict I/s and O/s  - urine culture GNR and Enterobacter  - blood cx NGTD    - appreciate ostomy consult for significant leakage around ostomy appliance    - No further plans for inpatient Urologic intervention. Urology will sign off, please call with questions or concerns.   - Will arrange outpatient Urologic follow up   - Rest of care per primary           VTE Risk Mitigation (From admission, onward)         Ordered     heparin (porcine) injection 5,000 Units  Every 8 hours         09/11/23 1521     IP VTE HIGH RISK PATIENT  Once         09/09/23 2045     Place sequential compression device  Until discontinued         09/09/23 2045                Karina Beverly MD  Urology  [facility] - Med Surg

## 2023-09-12 NOTE — NURSING
Nurses Note -- 4 Eyes      9/12/2023   6:55 PM      Skin assessed during: Admit      [x] No Altered Skin Integrity Present    [x]Prevention Measures Documented      [] Yes- Altered Skin Integrity Present or Discovered   [] LDA Added if Not in Epic (Describe Wound)   [] New Altered Skin Integrity was Present on Admit and Documented in LDA   [] Wound Image Taken    Wound Care Consulted? No    Attending Nurse: Lindy Antunez Lpn     Second RN/Staff Member:   Ginny REYES

## 2023-09-12 NOTE — ASSESSMENT & PLAN NOTE
58 year old woman with hx of cervical CA s/p chemotherapy and XRT c/b bilateral ureteral strictures requiring bilateral nephrostomy tubes (currently left tube remains only; right removed in April ), s/p urinary to colon conduit 2020 c/b bowel perforation s/p right hemicolectomy and ostomy and recurrent MDR infections , QTc prolongation, PTSD, major depression who presented with fatigue and abdominal pain X 2 weeks.  Noted to have ODESSA (creatinine 3.7, baseline 1.1), U/A >100 WBC. CT A/P with right sided hydronephrosis with thickening of the proximal right ureter and concern for pyelitis. Left NT shown to be in good position.  ID consulted for pyelitis.       Complicated urologic history.  Most recently had antegrade nephrostogram and loopogram 6/6/223 which showed reflux on the right  And left distal ureteral stricture.  Left nephrostomy tube last changed on 6/7.  Urology consulted. Reviewed CT and determined right hydronephrosis stable.  No further intervention recommended at this time. Went for left percutaneous nephrostomy tube change 9/11 with IR.      She is currently on IV ertapenem given hx of MDR UTI. Blood cultures NGTD. Urine culture + GNR, E. cloacae and E. faceium.    Recommendations  · Continue IV Ertapenem  · Start IV Daptomycin for E. faceium given hx of VRE. CPK ordered.  · Follow up urine culture data and adjust antibiotics accordingly  · Discussed plan with ID staff and HM team. Will follow up tomorrow.

## 2023-09-12 NOTE — PLAN OF CARE
Problem: Violence Risk or Actual  Goal: Anger and Impulse Control  Outcome: Ongoing, Progressing     Problem: Adult Inpatient Plan of Care  Goal: Plan of Care Review  Outcome: Ongoing, Progressing  Goal: Absence of Hospital-Acquired Illness or Injury  Outcome: Ongoing, Progressing     Problem: Skin Injury Risk Increased  Goal: Skin Health and Integrity  Outcome: Ongoing, Progressing     Problem: Adult Inpatient Plan of Care  Goal: Patient-Specific Goal (Individualized)  Outcome: Ongoing, Not Progressing  Goal: Optimal Comfort and Wellbeing  Outcome: Ongoing, Not Progressing  Goal: Readiness for Transition of Care  Outcome: Ongoing, Not Progressing     Problem: Fluid and Electrolyte Imbalance (Acute Kidney Injury/Impairment)  Goal: Fluid and Electrolyte Balance  Outcome: Ongoing, Not Progressing     Problem: Oral Intake Inadequate (Acute Kidney Injury/Impairment)  Goal: Optimal Nutrition Intake  Outcome: Ongoing, Not Progressing     Problem: Renal Function Impairment (Acute Kidney Injury/Impairment)  Goal: Effective Renal Function  Outcome: Ongoing, Not Progressing   Pt is A,A,O x 4 . Stable V/S. Pt C/O generalized abd pain 8-10/10 . Pt received schedule tylenol as ordered. Pt turned in bed independently. No skin breakdown noticed. Ileostomy bag was leaking today and changed twice during the day . Urostomy bag and nephrostomy tube care done and  urine out put measured and charted in Epic. Pt continued to received continues LR @ 100 cc/hr. Pt started on Daptomycin  IV today. Pt seen by urology today. Pt had a poor appetite and started on boost today.

## 2023-09-12 NOTE — SUBJECTIVE & OBJECTIVE
Interval History: NAEON. Patient c/o appropriate pain related to procedure. Patient requesting Boost to help with nutrition.     Review of Systems   Constitutional:  Positive for fatigue and fever. Negative for chills.   HENT:  Negative for sore throat and trouble swallowing.    Eyes:  Negative for visual disturbance.   Respiratory:  Negative for chest tightness, shortness of breath and wheezing.    Cardiovascular:  Negative for chest pain and leg swelling.   Gastrointestinal:  Positive for abdominal pain, nausea and vomiting. Negative for abdominal distention, blood in stool, constipation and diarrhea.   Genitourinary:  Negative for decreased urine volume, difficulty urinating, hematuria and pelvic pain.   Musculoskeletal:  Negative for back pain.   Skin:  Negative for wound.   Neurological:  Negative for dizziness, syncope, light-headedness and headaches.   Psychiatric/Behavioral:  Negative for dysphoric mood. The patient is not nervous/anxious.      Objective:     Vital Signs (Most Recent):  Temp: 97.6 °F (36.4 °C) (09/12/23 0726)  Pulse: 70 (09/12/23 0726)  Resp: 18 (09/12/23 0726)  BP: (!) 110/59 (09/12/23 0726)  SpO2: 99 % (09/12/23 0726) Vital Signs (24h Range):  Temp:  [97.4 °F (36.3 °C)-98.6 °F (37 °C)] 97.6 °F (36.4 °C)  Pulse:  [67-95] 70  Resp:  [14-20] 18  SpO2:  [99 %-100 %] 99 %  BP: ()/(53-68) 110/59     Weight: 70.8 kg (156 lb)  Body mass index is 23.04 kg/m².    Intake/Output Summary (Last 24 hours) at 9/12/2023 0834  Last data filed at 9/12/2023 0607  Gross per 24 hour   Intake 420 ml   Output 2750 ml   Net -2330 ml         Physical Exam  Vitals and nursing note reviewed.   Constitutional:       General: She is not in acute distress.     Appearance: She is ill-appearing.      Comments: Chronically ill appearing female, no acute distress but in obvious discomfort   HENT:      Head: Normocephalic and atraumatic.   Eyes:      General: No scleral icterus.  Cardiovascular:      Rate and Rhythm:  Normal rate and regular rhythm.      Heart sounds: No murmur heard.  Pulmonary:      Effort: Pulmonary effort is normal. No respiratory distress.      Breath sounds: Normal breath sounds. No wheezing or rales.   Abdominal:      General: Abdomen is flat. There is no distension.      Palpations: Abdomen is soft.      Tenderness: There is abdominal tenderness. There is guarding (voluntary).      Comments: Ileostomy bag in place with brown output  Urostomy and nephrostomy bag in place with yellow, non bloody urine  Ostomy sites do not appear irritated or inflamed  Abdomen diffusely tender to palpation  Unable to assess CVA tenderness due to body positioning and pt weakness limiting ability to participate   Musculoskeletal:      Cervical back: Normal range of motion. No rigidity.      Right lower leg: No edema.      Left lower leg: No edema.   Skin:     General: Skin is warm and dry.   Neurological:      General: No focal deficit present.      Mental Status: She is alert.      Motor: Weakness (diffuse) present.   Psychiatric:         Behavior: Behavior normal.         Thought Content: Thought content normal.             Significant Labs: All pertinent labs within the past 24 hours have been reviewed.    CBC:   Recent Labs   Lab 09/11/23  0612 09/11/23 1921 09/12/23  0704   WBC 5.99 6.28 4.23   HGB 13.3 10.8* 11.3*   HCT 43.4 35.3* 36.5*    262 265     CMP:   Recent Labs   Lab 09/11/23 0612 09/11/23 1921 09/12/23  0704    143 142   K 3.8 3.5 3.5   * 113* 115*   CO2 17* 21* 18*    94 88   BUN 49* 33* 23*   CREATININE 1.9* 1.3 1.1   CALCIUM 9.3 8.7 8.5*   PROT 7.9 6.9 6.4   ALBUMIN 3.1* 2.8* 2.6*   BILITOT 0.5 0.4 0.4   ALKPHOS 111 97 92   AST 27 30 48*   ALT 24 25 31   ANIONGAP 11 9 9       Significant Imaging: I have reviewed all pertinent imaging results/findings within the past 24 hours.

## 2023-09-12 NOTE — CONSULTS
Patient consult for wound care, for urostomy care. The patient states that she have had the urostomy stoma and ileostomy site for five years and that she is very familiar with them both and she care for both sites herself. On yesterday the urostomy had leakage, the skin site was cleanse with soap and water pat dry and the urostomy bag re applied. The bag intact throughout night without leakage. Wound care signing off please re consult if need.

## 2023-09-13 LAB
ALBUMIN SERPL BCP-MCNC: 2.6 G/DL (ref 3.5–5.2)
ALP SERPL-CCNC: 100 U/L (ref 55–135)
ALT SERPL W/O P-5'-P-CCNC: 45 U/L (ref 10–44)
ANION GAP SERPL CALC-SCNC: 8 MMOL/L (ref 8–16)
AST SERPL-CCNC: 50 U/L (ref 10–40)
BASOPHILS # BLD AUTO: 0.03 K/UL (ref 0–0.2)
BASOPHILS NFR BLD: 0.9 % (ref 0–1.9)
BILIRUB SERPL-MCNC: 0.3 MG/DL (ref 0.1–1)
BUN SERPL-MCNC: 14 MG/DL (ref 6–20)
CALCIUM SERPL-MCNC: 8.6 MG/DL (ref 8.7–10.5)
CHLORIDE SERPL-SCNC: 114 MMOL/L (ref 95–110)
CK SERPL-CCNC: 68 U/L (ref 20–180)
CO2 SERPL-SCNC: 18 MMOL/L (ref 23–29)
CREAT SERPL-MCNC: 1 MG/DL (ref 0.5–1.4)
DIFFERENTIAL METHOD: ABNORMAL
EOSINOPHIL # BLD AUTO: 0.1 K/UL (ref 0–0.5)
EOSINOPHIL NFR BLD: 3.1 % (ref 0–8)
ERYTHROCYTE [DISTWIDTH] IN BLOOD BY AUTOMATED COUNT: 14.6 % (ref 11.5–14.5)
EST. GFR  (NO RACE VARIABLE): >60 ML/MIN/1.73 M^2
GLUCOSE SERPL-MCNC: 89 MG/DL (ref 70–110)
HCT VFR BLD AUTO: 36.9 % (ref 37–48.5)
HGB BLD-MCNC: 11.1 G/DL (ref 12–16)
IMM GRANULOCYTES # BLD AUTO: 0.01 K/UL (ref 0–0.04)
IMM GRANULOCYTES NFR BLD AUTO: 0.3 % (ref 0–0.5)
LYMPHOCYTES # BLD AUTO: 1.2 K/UL (ref 1–4.8)
LYMPHOCYTES NFR BLD: 34.6 % (ref 18–48)
MAGNESIUM SERPL-MCNC: 1.5 MG/DL (ref 1.6–2.6)
MCH RBC QN AUTO: 26.8 PG (ref 27–31)
MCHC RBC AUTO-ENTMCNC: 30.1 G/DL (ref 32–36)
MCV RBC AUTO: 89 FL (ref 82–98)
MONOCYTES # BLD AUTO: 0.6 K/UL (ref 0.3–1)
MONOCYTES NFR BLD: 18.3 % (ref 4–15)
NEUTROPHILS # BLD AUTO: 1.5 K/UL (ref 1.8–7.7)
NEUTROPHILS NFR BLD: 42.8 % (ref 38–73)
NRBC BLD-RTO: 0 /100 WBC
PHOSPHATE SERPL-MCNC: 2.8 MG/DL (ref 2.7–4.5)
PLATELET # BLD AUTO: 270 K/UL (ref 150–450)
PMV BLD AUTO: 10.5 FL (ref 9.2–12.9)
POCT GLUCOSE: 103 MG/DL (ref 70–110)
POCT GLUCOSE: 99 MG/DL (ref 70–110)
POTASSIUM SERPL-SCNC: 3.5 MMOL/L (ref 3.5–5.1)
PROT SERPL-MCNC: 6.1 G/DL (ref 6–8.4)
RBC # BLD AUTO: 4.14 M/UL (ref 4–5.4)
SODIUM SERPL-SCNC: 140 MMOL/L (ref 136–145)
WBC # BLD AUTO: 3.5 K/UL (ref 3.9–12.7)

## 2023-09-13 PROCEDURE — 99233 PR SUBSEQUENT HOSPITAL CARE,LEVL III: ICD-10-PCS | Mod: ,,, | Performed by: PHYSICIAN ASSISTANT

## 2023-09-13 PROCEDURE — 84100 ASSAY OF PHOSPHORUS: CPT

## 2023-09-13 PROCEDURE — 99233 PR SUBSEQUENT HOSPITAL CARE,LEVL III: ICD-10-PCS | Mod: ,,, | Performed by: HOSPITALIST

## 2023-09-13 PROCEDURE — 83735 ASSAY OF MAGNESIUM: CPT

## 2023-09-13 PROCEDURE — 36415 COLL VENOUS BLD VENIPUNCTURE: CPT

## 2023-09-13 PROCEDURE — 25000003 PHARM REV CODE 250: Performed by: PHYSICIAN ASSISTANT

## 2023-09-13 PROCEDURE — 80053 COMPREHEN METABOLIC PANEL: CPT

## 2023-09-13 PROCEDURE — 85025 COMPLETE CBC W/AUTO DIFF WBC: CPT

## 2023-09-13 PROCEDURE — 63600175 PHARM REV CODE 636 W HCPCS

## 2023-09-13 PROCEDURE — 99233 SBSQ HOSP IP/OBS HIGH 50: CPT | Mod: ,,, | Performed by: PHYSICIAN ASSISTANT

## 2023-09-13 PROCEDURE — 11000001 HC ACUTE MED/SURG PRIVATE ROOM

## 2023-09-13 PROCEDURE — 63600175 PHARM REV CODE 636 W HCPCS: Performed by: PHYSICIAN ASSISTANT

## 2023-09-13 PROCEDURE — 25000003 PHARM REV CODE 250

## 2023-09-13 PROCEDURE — 82550 ASSAY OF CK (CPK): CPT | Performed by: PHYSICIAN ASSISTANT

## 2023-09-13 PROCEDURE — 99233 SBSQ HOSP IP/OBS HIGH 50: CPT | Mod: ,,, | Performed by: HOSPITALIST

## 2023-09-13 RX ORDER — MAGNESIUM SULFATE HEPTAHYDRATE 40 MG/ML
2 INJECTION, SOLUTION INTRAVENOUS ONCE
Status: COMPLETED | OUTPATIENT
Start: 2023-09-13 | End: 2023-09-13

## 2023-09-13 RX ADMIN — HEPARIN SODIUM 5000 UNITS: 5000 INJECTION INTRAVENOUS; SUBCUTANEOUS at 02:09

## 2023-09-13 RX ADMIN — ACETAMINOPHEN 650 MG: 325 TABLET ORAL at 05:09

## 2023-09-13 RX ADMIN — ACETAMINOPHEN 650 MG: 325 TABLET ORAL at 11:09

## 2023-09-13 RX ADMIN — ERTAPENEM 1 G: 1 INJECTION INTRAMUSCULAR; INTRAVENOUS at 05:09

## 2023-09-13 RX ADMIN — HEPARIN SODIUM 5000 UNITS: 5000 INJECTION INTRAVENOUS; SUBCUTANEOUS at 05:09

## 2023-09-13 RX ADMIN — ACETAMINOPHEN 650 MG: 325 TABLET ORAL at 06:09

## 2023-09-13 RX ADMIN — MAGNESIUM SULFATE 2 G: 2 INJECTION INTRAVENOUS at 12:09

## 2023-09-13 RX ADMIN — HEPARIN SODIUM 5000 UNITS: 5000 INJECTION INTRAVENOUS; SUBCUTANEOUS at 09:09

## 2023-09-13 RX ADMIN — ACETAMINOPHEN 650 MG: 325 TABLET ORAL at 12:09

## 2023-09-13 RX ADMIN — SODIUM CHLORIDE 500 ML: 9 INJECTION, SOLUTION INTRAVENOUS at 11:09

## 2023-09-13 RX ADMIN — DAPTOMYCIN 565 MG: 350 INJECTION, POWDER, LYOPHILIZED, FOR SOLUTION INTRAVENOUS at 06:09

## 2023-09-13 NOTE — PLAN OF CARE
Pt can dc home with oral abx per ID recs, Pt to dc to family at home, following with medical stability likely tomorrow home with spouse.

## 2023-09-13 NOTE — PROGRESS NOTES
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  Noted to have ODESSA (creatinine 3.7, baseline 1.1), U/A >100 WBC. CT A/P with right sided hydronephrosis with thickening of the proximal right ureter and concern for pyelitis. Left NT shown to be in good position.  ID consulted for pyelitis.       Complicated urologic history.  Most recently had antegrade nephrostogram and loopogram 6/6/23 which showed reflux on the right and left distal ureteral stricture.  Left nephrostomy tube last changed on 6/7.  Urology consulted. Reviewed CT and determined right hydronephrosis stable.  No further intervention recommended at this time. Went for left percutaneous nephrostomy tube change 9/11 with IR.      She is currently on IV ertapenem given hx of MDR UTI. Blood cultures NGTD. Urine culture + Acinetobacter, E. cloacae and E. faceium.    Recommendations  · Transition IV Ertapenem to PO Ciprofloxacin for E. Cloacae/Acinetobacter for a total antibiotic duration of 14 days from nephrostomy tube exchange (end date 9/25/23)  · Continue IV Daptomycin for E. faceium while inpatient. Unclear significance given low colony count, though would complete three day antibiotic course. If discharged, can complete treatment with Linezolid.  · Discussed  plan with ID staff. ID will sign off.        Thank you for the consult. Please secure chat for any questions.  Megha Zhang PA-C    Subjective:     Principal Problem:Pyelitis    HPI: Edita Riley is a 58 year old woman with hx of cervical CA c/b bilateral ureteral strictures requiring bilateral nephrostomy tubes (currently left tube remains only), s/p urinary to colon conduit 2020 c/b bowel perforation s/p right hemicolectomy and ostomy, recurrent MDR infections , QTc prolongation, PTSD, major depression who presented for worsening fatigue and abdominal pain.  In ED afebrile, WBC 11.  Noted to have ODESSA (creatinine 3.7, baseline 1.1), U/A >100 WBC (unclear source).  CT A/P with right sided hydronephrosis with thickening of the proximal right ureter and concern for pyelitis.  ID consulted for pyelitis.      Blood cultures NGTD. Urine culture pending. VSS.  She is currently on IV ertapenem.     Interval History: NAEON. S/P left percutaneous nephrostomy tube change.  Poor appetite and protein supplementation added yesterday. Cx + Acinetobacter, E. Cloecae and E. Faecium.    Review of Systems   Constitutional:  Positive for activity change, appetite change and fatigue. Negative for chills.   HENT: Negative.     Eyes: Negative.    Respiratory:  Negative for shortness of breath and wheezing.    Cardiovascular:  Negative for chest pain and leg swelling.   Gastrointestinal:  Positive for abdominal pain and nausea. Negative for abdominal distention, blood in stool, constipation and diarrhea.   Genitourinary:  Negative for decreased urine volume, difficulty urinating, hematuria and pelvic pain.        Left nephrostomy tube      Musculoskeletal:  Negative for back pain.   Skin:  Negative for wound.   Neurological:  Negative for dizziness, syncope and headaches.   Psychiatric/Behavioral:  Negative for agitation. The patient is not nervous/anxious.      Objective:     Vital Signs (Most Recent):  Temp: 97.6 °F (36.4  °C) (09/13/23 1100)  Pulse: 65 (09/13/23 1100)  Resp: 16 (09/13/23 1100)  BP: 104/61 (09/13/23 1100)  SpO2: 99 % (09/13/23 1100) Vital Signs (24h Range):  Temp:  [0.6 °F (-17.4 °C)-97.7 °F (36.5 °C)] 97.6 °F (36.4 °C)  Pulse:  [61-65] 65  Resp:  [16-18] 16  SpO2:  [96 %-100 %] 99 %  BP: (101-116)/(53-61) 104/61     Weight: 70.8 kg (156 lb)  Body mass index is 23.04 kg/m².    Estimated Creatinine Clearance: 62.5 mL/min (based on SCr of 1 mg/dL).     Physical Exam  Vitals and nursing note reviewed.   Constitutional:       General: She is not in acute distress.     Appearance: She is ill-appearing (chronically ill appearing). She is not toxic-appearing or diaphoretic.   HENT:      Head: Normocephalic and atraumatic.      Mouth/Throat:      Mouth: Mucous membranes are moist.   Eyes:      General: No scleral icterus.     Conjunctiva/sclera: Conjunctivae normal.   Cardiovascular:      Rate and Rhythm: Normal rate and regular rhythm.      Heart sounds: No murmur heard.  Pulmonary:      Effort: Pulmonary effort is normal. No respiratory distress.      Breath sounds: Normal breath sounds.   Abdominal:      General: There is no distension.      Palpations: Abdomen is soft.      Tenderness: There is abdominal tenderness. There is right CVA tenderness. There is no left CVA tenderness.      Comments: Ileostomy in place   Urostomy - yellow urine   Genitourinary:     Comments: Left Nephrostomy in place - clear urine  Musculoskeletal:      Right lower leg: No edema.      Left lower leg: No edema.   Skin:     General: Skin is warm and dry.   Neurological:      Mental Status: She is alert and oriented to person, place, and time.   Psychiatric:         Mood and Affect: Mood normal.         Behavior: Behavior normal.          Significant Labs: CBC:   Recent Labs   Lab 09/11/23  1921 09/12/23  0704 09/13/23  0520   WBC 6.28 4.23 3.50*   HGB 10.8* 11.3* 11.1*   HCT 35.3* 36.5* 36.9*    265 270       CMP:   Recent Labs   Lab  09/11/23  1921 09/12/23  0704 09/13/23  0520    142 140   K 3.5 3.5 3.5   * 115* 114*   CO2 21* 18* 18*   GLU 94 88 89   BUN 33* 23* 14   CREATININE 1.3 1.1 1.0   CALCIUM 8.7 8.5* 8.6*   PROT 6.9 6.4 6.1   ALBUMIN 2.8* 2.6* 2.6*   BILITOT 0.4 0.4 0.3   ALKPHOS 97 92 100   AST 30 48* 50*   ALT 25 31 45*   ANIONGAP 9 9 8       Microbiology Results (last 7 days)       Procedure Component Value Units Date/Time    Urine culture [[id number]]  (Abnormal)  (Susceptibility) Collected: 09/09/23 1416    Order Status: Completed Specimen: Urine Updated: 09/13/23 1356     Urine Culture, Routine ENTEROBACTER CLOACAE  >100,000 cfu/ml        ACINETOBACTER BAUMANNII/HAEMOLYTICUS  50,000 - 99,999 cfu/ml  Susceptibility pending      Narrative:      Specimen Source->Urine    Blood Culture #2 **CANNOT BE ORDERED STAT** [[id number]] Collected: 09/09/23 1806    Order Status: Completed Specimen: Blood from Peripheral, Hand, Left Updated: 09/12/23 2012     Blood Culture, Routine No Growth to date      No Growth to date      No Growth to date      No Growth to date    Blood Culture #1 **CANNOT BE ORDERED STAT** [[id number]] Collected: 09/09/23 1807    Order Status: Completed Specimen: Blood from Peripheral, Hand, Right Updated: 09/12/23 2012     Blood Culture, Routine No Growth to date      No Growth to date      No Growth to date      No Growth to date    Urine culture [[id number]]  (Abnormal) Collected: 09/11/23 1226    Order Status: Completed Specimen: Urine, Nephrostomy Tube, Left Updated: 09/12/23 1153     Urine Culture, Routine ENTEROCOCCUS FAECIUM  10,000 - 49,999 cfu/ml  Susceptibility pending  No other significant isolate      Narrative:      Indicated criteria for high risk culture:->Prior to urologic  procedures    Urine culture [[id number]]     Order Status: Canceled Specimen: Urine, Nephrostomy Tube, Left           Recent Lab Results         09/13/23  0520   09/12/23  1937   09/12/23  1531        Albumin 2.6            Alkaline Phosphatase 100           ALT 45           Anion Gap 8           AST 50           Baso # 0.03           Basophil % 0.9           BILIRUBIN TOTAL 0.3  Comment: For infants and newborns, interpretation of results should be based  on gestational age, weight and in agreement with clinical  observations.    Premature Infant recommended reference ranges:  Up to 24 hours.............<8.0 mg/dL  Up to 48 hours............<12.0 mg/dL  3-5 days..................<15.0 mg/dL  6-29 days.................<15.0 mg/dL             BUN 14           Calcium 8.6           Chloride 114           CO2 18           CPK 68           Creatinine 1.0           Differential Method Automated           eGFR >60.0           Eos # 0.1           Eosinophil % 3.1           Glucose 89           Gran # (ANC) 1.5           Gran % 42.8           Hematocrit 36.9           Hemoglobin 11.1           Immature Grans (Abs) 0.01  Comment: Mild elevation in immature granulocytes is non specific and   can be seen in a variety of conditions including stress response,   acute inflammation, trauma and pregnancy. Correlation with other   laboratory and clinical findings is essential.             Immature Granulocytes 0.3           Lymph # 1.2           Lymph % 34.6           Magnesium  1.5           MCH 26.8           MCHC 30.1           MCV 89           Mono # 0.6           Mono % 18.3           MPV 10.5           nRBC 0           Phosphorus 2.8           Platelets 270           POCT Glucose   139   99       Potassium 3.5           PROTEIN TOTAL 6.1           RBC 4.14           RDW 14.6           Sodium 140           WBC 3.50                   Significant Imaging:   Imaging Results              CT Abdomen Pelvis  Without Contrast (Final result)  Result time 09/09/23 20:07:17      Final result by Vinayak Baer DO (09/09/23 20:07:17)                   Impression:      1. Postoperative changes of urinary diversion with a colon conduit and left lower  quadrant colostomy.  Continued moderate right-sided hydronephrosis with mild wall thickening of the right proximal ureter.  Recommend correlation with urinalysis to exclude pyelitis.  2. Left-sided percutaneous nephrostomy tube in place.  No left-sided hydronephrosis.  3. Postoperative changes of partial colectomy with a right lower quadrant ileostomy.  No bowel obstruction or parastomal hernia.      Electronically signed by: Vinayak Baer  Date:    09/09/2023  Time:    20:07               Narrative:    EXAMINATION:  CT ABDOMEN PELVIS WITHOUT CONTRAST    CLINICAL HISTORY:  Abdominal abscess/infection suspected;Nausea/vomiting;Bowel obstruction suspected;    TECHNIQUE:  Multiplanar images were obtained of the abdomen and pelvis from the hemidiaphragms through the symphysis pubis without intravenous contrast.    COMPARISON:  CT of the abdomen and pelvis from 08/18/2023.    FINDINGS:  Lung Bases: Clear.    Heart: Heart size is normal.  No pericardial effusion.    Liver: Normal in size and attenuation without focal hepatic lesion.    Biliary tract: There is mild prominence of the common bile duct, stable from prior and likely related to cholecystectomy changes.  No intrahepatic biliary ductal a shin.    Gallbladder: Surgically absent.    Pancreas: Normal. No pancreatic ductal dilatation.    Spleen: Normal size without focal lesion.    Adrenals: Normal.    Kidneys and urinary collecting systems: There are postoperative changes of urinary diversion with a colon conduit and left lower quadrant colostomy.  There is a left-sided percutaneous nephrostomy tube with the pigtail in the renal collecting system.  There is no left-sided hydronephrosis.  There is a moderate right-sided hydronephrosis, relatively stable in comparison with prior.    Lymph nodes: None enlarged.    Stomach and bowel: The stomach is normal.  There are postop changes of partial colectomy with a right lower quadrant and ileostomy.  There is no bowel  obstruction.  There is no peristomal hernia.    Peritoneum and mesentery: No ascites or free intraperitoneal air. No abdominal fluid collection.  Multiple surgical clips noted in the pelvis and lower abdomen.    Vasculature: Normal.    Urinary bladder: The urinary bladder is contracted    Reproductive organs: The uterus is absent.    Body wall: No abnormality.    Musculoskeletal: No aggressive osseous lesion.                        Final result by Vinayak Baer DO (09/09/23 20:07:17)                   Impression:      1. Postoperative changes of urinary diversion with a colon conduit and left lower quadrant colostomy.  Continued moderate right-sided hydronephrosis with mild wall thickening of the right proximal ureter.  Recommend correlation with urinalysis to exclude pyelitis.  2. Left-sided percutaneous nephrostomy tube in place.  No left-sided hydronephrosis.  3. Postoperative changes of partial colectomy with a right lower quadrant ileostomy.  No bowel obstruction or parastomal hernia.      Electronically signed by: Vinayak Baer  Date:    09/09/2023  Time:    20:07               Narrative:    EXAMINATION:  CT ABDOMEN PELVIS WITHOUT CONTRAST    CLINICAL HISTORY:  Abdominal abscess/infection suspected;Nausea/vomiting;Bowel obstruction suspected;    TECHNIQUE:  Multiplanar images were obtained of the abdomen and pelvis from the hemidiaphragms through the symphysis pubis without intravenous contrast.    COMPARISON:  CT of the abdomen and pelvis from 08/18/2023.    FINDINGS:  Lung Bases: Clear.    Heart: Heart size is normal.  No pericardial effusion.    Liver: Normal in size and attenuation without focal hepatic lesion.    Biliary tract: There is mild prominence of the common bile duct, stable from prior and likely related to cholecystectomy changes.  No intrahepatic biliary ductal a shin.    Gallbladder: Surgically absent.    Pancreas: Normal. No pancreatic ductal dilatation.    Spleen: Normal size without  focal lesion.    Adrenals: Normal.    Kidneys and urinary collecting systems: There are postoperative changes of urinary diversion with a colon conduit and left lower quadrant colostomy.  There is a left-sided percutaneous nephrostomy tube with the pigtail in the renal collecting system.  There is no left-sided hydronephrosis.  There is a moderate right-sided hydronephrosis, relatively stable in comparison with prior.    Lymph nodes: None enlarged.    Stomach and bowel: The stomach is normal.  There are postop changes of partial colectomy with a right lower quadrant and ileostomy.  There is no bowel obstruction.  There is no peristomal hernia.    Peritoneum and mesentery: No ascites or free intraperitoneal air. No abdominal fluid collection.  Multiple surgical clips noted in the pelvis and lower abdomen.    Vasculature: Normal.    Urinary bladder: The urinary bladder is contracted    Reproductive organs: The uterus is absent.    Body wall: No abnormality.    Musculoskeletal: No aggressive osseous lesion.

## 2023-09-13 NOTE — ASSESSMENT & PLAN NOTE
Creatinine 3.7 on admit, baseline around 1.1  GFR on admit 13.5, baseline around 50    Plan:   Lab Results   Component Value Date    CREATININE 1.0 09/13/2023     - See ODESSA  - Avoid nephrotoxic agents such as NSAIDs, gadolinium and IV radiocontrast.  - Renally dose meds to current GFR.  - Maintain MAP > 65.

## 2023-09-13 NOTE — ASSESSMENT & PLAN NOTE
58 year old woman with hx of cervical CA s/p chemotherapy and XRT c/b bilateral ureteral strictures requiring bilateral nephrostomy tubes (currently left tube remains only; right removed in April ), s/p urinary to colon conduit 2020 c/b bowel perforation s/p right hemicolectomy and ostomy and recurrent MDR infections , QTc prolongation, PTSD, major depression who presented with fatigue and abdominal pain X 2 weeks.  Noted to have ODESSA (creatinine 3.7, baseline 1.1), U/A >100 WBC. CT A/P with right sided hydronephrosis with thickening of the proximal right ureter and concern for pyelitis. Left NT shown to be in good position.  ID consulted for pyelitis.       Complicated urologic history.  Most recently had antegrade nephrostogram and loopogram 6/6/23 which showed reflux on the right and left distal ureteral stricture.  Left nephrostomy tube last changed on 6/7.  Urology consulted. Reviewed CT and determined right hydronephrosis stable.  No further intervention recommended at this time. Went for left percutaneous nephrostomy tube change 9/11 with IR.      She is currently on IV ertapenem given hx of MDR UTI. Blood cultures NGTD. Urine culture + Acinetobacter, E. cloacae and E. faceium.    Recommendations  · Transition IV Ertapenem to PO Ciprofloxacin for E. Cloacae/Acinetobacter for a total antibiotic duration of 14 days from nephrostomy tube exchange (end date 9/25/23)  · Continue IV Daptomycin for E. faceium while inpatient. Unclear significance given low colony count, though would complete three day antibiotic course. If discharged, can complete treatment with Linezolid.  · Discussed plan with ID staff. ID will sign off.

## 2023-09-13 NOTE — SUBJECTIVE & OBJECTIVE
Interval History: NAEON, patient resting comfortably in bed this morning. Pain well controlled with scheduled tylenol. ID started IV daptomycin yesterday for Enterococcus Faecium from urine cx 9/11.     Review of Systems   Constitutional:  Positive for fatigue and fever. Negative for chills.   HENT:  Negative for sore throat and trouble swallowing.    Eyes:  Negative for visual disturbance.   Respiratory:  Negative for chest tightness, shortness of breath and wheezing.    Cardiovascular:  Negative for chest pain and leg swelling.   Gastrointestinal:  Positive for abdominal pain, nausea and vomiting. Negative for abdominal distention, blood in stool, constipation and diarrhea.   Genitourinary:  Negative for decreased urine volume, difficulty urinating, hematuria and pelvic pain.   Musculoskeletal:  Negative for back pain.   Skin:  Negative for wound.   Neurological:  Negative for dizziness, syncope, light-headedness and headaches.   Psychiatric/Behavioral:  Negative for dysphoric mood. The patient is not nervous/anxious.      Objective:     Vital Signs (Most Recent):  Temp: 98.3 °F (36.8 °C) (09/13/23 1533)  Pulse: 64 (09/13/23 1533)  Resp: 16 (09/13/23 1533)  BP: (!) 105/59 (09/13/23 1533)  SpO2: 99 % (09/13/23 1533) Vital Signs (24h Range):  Temp:  [0.6 °F (-17.4 °C)-98.3 °F (36.8 °C)] 98.3 °F (36.8 °C)  Pulse:  [61-65] 64  Resp:  [16-18] 16  SpO2:  [96 %-99 %] 99 %  BP: (101-111)/(53-61) 105/59     Weight: 70.8 kg (156 lb)  Body mass index is 23.04 kg/m².    Intake/Output Summary (Last 24 hours) at 9/13/2023 1600  Last data filed at 9/13/2023 1303  Gross per 24 hour   Intake 237 ml   Output 1225 ml   Net -988 ml         Physical Exam  Vitals and nursing note reviewed.   Constitutional:       General: She is not in acute distress.     Appearance: She is ill-appearing.      Comments: Chronically ill appearing female, no acute distress but in obvious discomfort   HENT:      Head: Normocephalic and atraumatic.   Eyes:       General: No scleral icterus.  Cardiovascular:      Rate and Rhythm: Normal rate and regular rhythm.      Heart sounds: No murmur heard.  Pulmonary:      Effort: Pulmonary effort is normal. No respiratory distress.      Breath sounds: Normal breath sounds. No wheezing or rales.   Abdominal:      General: Abdomen is flat. There is no distension.      Palpations: Abdomen is soft.      Tenderness: There is abdominal tenderness. There is guarding (voluntary).      Comments: Ileostomy bag in place with brown output  Urostomy and nephrostomy bag in place with yellow, non bloody urine  Ostomy sites do not appear irritated or inflamed  Abdomen diffusely tender to palpation  Unable to assess CVA tenderness due to body positioning and pt weakness limiting ability to participate   Musculoskeletal:      Cervical back: Normal range of motion. No rigidity.      Right lower leg: No edema.      Left lower leg: No edema.   Skin:     General: Skin is warm and dry.   Neurological:      General: No focal deficit present.      Mental Status: She is alert.      Motor: Weakness (diffuse) present.   Psychiatric:         Behavior: Behavior normal.         Thought Content: Thought content normal.             Significant Labs: All pertinent labs within the past 24 hours have been reviewed.    CBC:   Recent Labs   Lab 09/11/23 1921 09/12/23  0704 09/13/23  0520   WBC 6.28 4.23 3.50*   HGB 10.8* 11.3* 11.1*   HCT 35.3* 36.5* 36.9*    265 270     CMP:   Recent Labs   Lab 09/11/23 1921 09/12/23  0704 09/13/23  0520    142 140   K 3.5 3.5 3.5   * 115* 114*   CO2 21* 18* 18*   GLU 94 88 89   BUN 33* 23* 14   CREATININE 1.3 1.1 1.0   CALCIUM 8.7 8.5* 8.6*   PROT 6.9 6.4 6.1   ALBUMIN 2.8* 2.6* 2.6*   BILITOT 0.4 0.4 0.3   ALKPHOS 97 92 100   AST 30 48* 50*   ALT 25 31 45*   ANIONGAP 9 9 8         Significant Imaging: I have reviewed all pertinent imaging results/findings within the past 24 hours.

## 2023-09-13 NOTE — PROGRESS NOTES
"[facility] Medicine  Progress Note    Patient Name: Edita Riley  MRN: [id number]  Patient Class: IP- Inpatient   Admission Date: 9/9/2023  Length of Stay: 4 days  Attending Physician: Stephanie Rosa MD  Primary Care Provider: Delma, Primary Doctor        Subjective:     Principal Problem:Pyelitis        HPI:  This is a 58 year old female with complicated medical history including cervical cancer in 2014 w recurrence in 2020 s/p chemotherapy and XRT, c/b bilateral ureteral strictures s/p bilateral nephrostomy tubes (R nephrostomy tube now removed, L tube remains in place) and transverse colon-urinary conduit in 2020 (this was also c/b bowel perforation requiring right hemicolectomy and ostomy creation), all of which has been complicated by recurrent MDR infections, as well as CVA at the age of 7, emphysema, htn, DVT of LE in 2020 no longer on treatment, and psychiatric comorbidities requiring prior PEC. She is presenting for worsening fatigue and abdominal pain. When asked how long she has been feeling poorly, she states "62 weeks", however when further prompted, she notes it has been exacerbated over the last 2 weeks. Her  has been ill with some sort of flu-like illness (pt unable to specify further), which she originally thought she caught from him. Today she vomited once, which prompted presentation to the ED. She describes the abdominal pain as "sharp" and located in the center of her abdomen. No aggravating or alleviating factors. Associated symptoms include subjective fever, though when she checked her temperature at home it was 98-99. She denies shortness of breath, cough, or changes in consistency or frequency of ileostomy/nephrostomy output. She has not noticed any new or worsening wounds.     She is established and follows with outpatient urology. Her nephrostomy tube was last exchanged in June, and there are plans for exchange and subsequent revision this month. She was " treated with an empiric course of Cefdinir 7/26, which she states she recently completed. Regarding her ID history, she has had multiple MDR UTIs including Enterobacter cloacae 5/30, Klebsiella penumoniae ESBL 5/11, E faecium VRE 4/18, and Klebsiella pneumoniae ESBL 2/14. She has received ertapenem and meropenem in the past. Blood cultures in 2020 also with yeast requiring micafungin.     In the ED, she was afebrile and HDS on RA. WBC 11.03. Labs also notable for significant ODESSA w BUN 66 and Cr 3.7 (recent baseline ~1.1). UA w 2+ protein, 1+ occult blood, negative nitrites, 3+ leuks, > 100 WBCs, occasional bacteria. CT AP notable for known R sided hydronephrosis w mild wall thickening of proximal R ureter, and possible pyelitis. She was treated with a 1/2L CC fluid bolus and a dose of CTX, and admitted to hospital medicine for further evaluation.       Overview/Hospital Course:  No notes on file    Interval History: NAEON, patient resting comfortably in bed this morning. Pain well controlled with scheduled tylenol. ID started IV daptomycin yesterday for Enterococcus Faecium from urine cx 9/11.     Review of Systems   Constitutional:  Positive for fatigue and fever. Negative for chills.   HENT:  Negative for sore throat and trouble swallowing.    Eyes:  Negative for visual disturbance.   Respiratory:  Negative for chest tightness, shortness of breath and wheezing.    Cardiovascular:  Negative for chest pain and leg swelling.   Gastrointestinal:  Positive for abdominal pain, nausea and vomiting. Negative for abdominal distention, blood in stool, constipation and diarrhea.   Genitourinary:  Negative for decreased urine volume, difficulty urinating, hematuria and pelvic pain.   Musculoskeletal:  Negative for back pain.   Skin:  Negative for wound.   Neurological:  Negative for dizziness, syncope, light-headedness and headaches.   Psychiatric/Behavioral:  Negative for dysphoric mood. The patient is not nervous/anxious.       Objective:     Vital Signs (Most Recent):  Temp: 98.3 °F (36.8 °C) (09/13/23 1533)  Pulse: 64 (09/13/23 1533)  Resp: 16 (09/13/23 1533)  BP: (!) 105/59 (09/13/23 1533)  SpO2: 99 % (09/13/23 1533) Vital Signs (24h Range):  Temp:  [0.6 °F (-17.4 °C)-98.3 °F (36.8 °C)] 98.3 °F (36.8 °C)  Pulse:  [61-65] 64  Resp:  [16-18] 16  SpO2:  [96 %-99 %] 99 %  BP: (101-111)/(53-61) 105/59     Weight: 70.8 kg (156 lb)  Body mass index is 23.04 kg/m².    Intake/Output Summary (Last 24 hours) at 9/13/2023 1600  Last data filed at 9/13/2023 1303  Gross per 24 hour   Intake 237 ml   Output 1225 ml   Net -988 ml         Physical Exam  Vitals and nursing note reviewed.   Constitutional:       General: She is not in acute distress.     Appearance: She is ill-appearing.      Comments: Chronically ill appearing female, no acute distress but in obvious discomfort   HENT:      Head: Normocephalic and atraumatic.   Eyes:      General: No scleral icterus.  Cardiovascular:      Rate and Rhythm: Normal rate and regular rhythm.      Heart sounds: No murmur heard.  Pulmonary:      Effort: Pulmonary effort is normal. No respiratory distress.      Breath sounds: Normal breath sounds. No wheezing or rales.   Abdominal:      General: Abdomen is flat. There is no distension.      Palpations: Abdomen is soft.      Tenderness: There is abdominal tenderness. There is guarding (voluntary).      Comments: Ileostomy bag in place with brown output  Urostomy and nephrostomy bag in place with yellow, non bloody urine  Ostomy sites do not appear irritated or inflamed  Abdomen diffusely tender to palpation  Unable to assess CVA tenderness due to body positioning and pt weakness limiting ability to participate   Musculoskeletal:      Cervical back: Normal range of motion. No rigidity.      Right lower leg: No edema.      Left lower leg: No edema.   Skin:     General: Skin is warm and dry.   Neurological:      General: No focal deficit present.      Mental  Status: She is alert.      Motor: Weakness (diffuse) present.   Psychiatric:         Behavior: Behavior normal.         Thought Content: Thought content normal.             Significant Labs: All pertinent labs within the past 24 hours have been reviewed.    CBC:   Recent Labs   Lab 09/11/23 1921 09/12/23  0704 09/13/23  0520   WBC 6.28 4.23 3.50*   HGB 10.8* 11.3* 11.1*   HCT 35.3* 36.5* 36.9*    265 270     CMP:   Recent Labs   Lab 09/11/23 1921 09/12/23  0704 09/13/23  0520    142 140   K 3.5 3.5 3.5   * 115* 114*   CO2 21* 18* 18*   GLU 94 88 89   BUN 33* 23* 14   CREATININE 1.3 1.1 1.0   CALCIUM 8.7 8.5* 8.6*   PROT 6.9 6.4 6.1   ALBUMIN 2.8* 2.6* 2.6*   BILITOT 0.4 0.4 0.3   ALKPHOS 97 92 100   AST 30 48* 50*   ALT 25 31 45*   ANIONGAP 9 9 8         Significant Imaging: I have reviewed all pertinent imaging results/findings within the past 24 hours.      Assessment/Plan:      * Pyelitis  This is a 58 year old female with complicated medical history including cervical cancer in 2014 s/p chemotherapy and XRT who then developed ureteral stricture 2/2 XRT s/p bilateral nephrostomy tubes (R nephrostomy tube now removed, L tube remains in place) and transverse colon-urinary conduit in 2020, which was c/b bowel perforation requiring right hemicolectomy and ostomy creation, all of which has been complicated by recurrent MDR infections presenting for worsening fatigue and abdominal pain x 2 weeks. No changes in consistency or frequency of ileostomy/urostomy/nephrostomy output.    - She has had significant ID history, including multiple MDR UTIs ( Enterobacter cloacae 5/30, Klebsiella penumoniae ESBL 5/11, E faecium VRE 4/18, and Klebsiella pneumoniae ESBL 2/14). She has received ertapenem and meropenem in the past. Blood cultures in 2020 also with yeast requiring micafungin.   - On admission, she is HDS and without leukocytosis (WBC 11)  - UA w 3+ leuks but negative nitrites and only occasional  bacteria. Not overwhelmingly concerning for infection, however given her history and imaging findings, will continue with treatment    Plan:  - Initiate ertapenem while awaiting further results  - F/u urine and blood cultures   - blood cx 9/9/23 NGTD   - urine cx 9/9/23 with Enterobacter cloacae and acinetobacter baumannii/haemolyticus    - urine cx 9/11/23 post nephrostomy exchange: Enterococcus Faecium, IV daptomycin started per ID recs 9/12/23  - F/u ID and urology consults, appreciate assistance  - Given overall hemodynamic stability, concern for sepsis is low. Will defer lactate at this time, however can consider addition to AM labs if indicated    Patient stable, ID recs for outpatient abx regimen per note 9/13/23. Tentative plan for discharge tomorrow.    Transition IV Ertapenem to PO Ciprofloxacin for E. Cloacae/Acinetobacter for a total antibiotic duration of 14 days from nephrostomy tube exchange (end date 9/25/23)   Continue IV Daptomycin for E. faceium while inpatient. Unclear significance given low colony count, though would complete three day antibiotic course. If discharged, can complete treatment with Linezolid.   Day 1 = 9/12/23 17:30    History of suicidal ideation  Pt required PEC/CEC 6/4-6/10 of this year due to SI in setting of MDD and complex bereavement (daughter committed suicide May 2023). This was rescinded when she no longer posed as a threat to herself.   Remeron is listed as an outpatient medication however she states she is not taking it. Denies SI at this time.     - Low threshold to engage psychiatry and/or resume Remeron if indicated      Refusal of blood transfusions as patient is Rastafari  Noted. No concerns for blood loss at this time, do not anticipate need for transfusion       Emphysema of lung  Mild diffuse emphysematous changes noted on Chest CT 2020. Has never smoked tobacco. CXR on admission stable.  - Consider addition of albuterol nebs/breathing treatments if  indicated          CKD (chronic kidney disease), stage III  Creatinine 3.7 on admit, baseline around 1.1  GFR on admit 13.5, baseline around 50    Plan:   Lab Results   Component Value Date    CREATININE 1.0 09/13/2023     - See ODESSA  - Avoid nephrotoxic agents such as NSAIDs, gadolinium and IV radiocontrast.  - Renally dose meds to current GFR.  - Maintain MAP > 65.        Nephrostomy status  She is established and follows with outpatient urology. Her nephrostomy tube was last exchanged in June. She was treated with an empiric course of Cefdinir 7/26, which she states she recently completed.  - See pyelitis  - Urology consult --> non urgent replacement of L nephrostomy, IR completed procedure 9/11/23  - nephrostomy with CYU today  - added scheduled tylenol 650mg q6h for pain       QT prolongation  QTc on admission 451 ms.   Documented QTc of 605 ms in April 2021.   Cautious utilization of QT prolonging medications.       Presence of urostomy  See pyelitis      History of DVT (deep vein thrombosis)  History of DVT in 2020, likely provoked due to prolonged/recurrent hospitalizations. Previously treated with Eliquis, no longer taking.     - DVT ppx w heparin  - held for IR procedure 9/11/23, restarted that evening      Ileostomy in place  See pyelitis      Anemia due to chronic kidney disease  Baseline Hgb ~10-11. WNL at 15 on admission  - Daily CBC       ODESSA (acute kidney injury)  Patient with acute kidney injury/acute renal failure. Differential is broad, consider post-renal due to known history of ureteral obstruction. Consider pre-renal/intrarenal in setting of active infection, though she is currently hemodynamically stable and withought leukocytosis, decreasing concern for sepsis/hypoperfusion. Not currently taking any nephrotoxic medications.   ODESSA is currently stable. Baseline creatinine 1.1 - Labs reviewed- Renal function/electrolytes with Estimated Creatinine Clearance: 62.5 mL/min (based on SCr of 1 mg/dL).  "according to latest data. Monitor urine output and serial BMP and adjust therapy as needed. Avoid nephrotoxins and renally dose meds for GFR listed above.    CT AP w continued moderate R sided hydronephrosis w mild wall thickening of R prox ureter; L sided percutaneous nephrostomy tube in place; no L sided hydro    Plan:  - Will defer RP US at this time given visualization of kidneys on CT  - F/u urine lytes  - S/p 500cc bolus in ED, will administer another 500cc  - L nephrostomy replaced today 9/11/23  - Cr improving, back at baseline (1.0 today)  - replace serum lytes PRN  - encourage OOB      Seizure-like activity  Per PCP note in April: "Had not seizure in a long time - - Sounds like Medication related   No vascular stenting"  - Noted, carbapenems lower seizure threshold, however at this time benefits of treatment appear to outweigh risks. Has tolerated ertapenem in the past.       History of cervical cancer  History of cervical cancer w mets to LN, s/p chemo and XRT.  Previously followed with Dr. Lemon in gyn/onc, last visit Dec 2020. Was supposed to RTC in 3 months, however appears to have been lost to follow up.   Recommend referral for continued outpatient maintenance at time of discharge.       History of essential hypertension  History of htn per chart review, does not currently take any outpatient antihypertensives  - Continue to monitor        VTE Risk Mitigation (From admission, onward)         Ordered     heparin (porcine) injection 5,000 Units  Every 8 hours         09/11/23 1521     IP VTE HIGH RISK PATIENT  Once         09/09/23 2045     Place sequential compression device  Until discontinued         09/09/23 2045                Discharge Planning   OK: 9/14/2023     Code Status: Full Code   Is the patient medically ready for discharge?: No    Reason for patient still in hospital (select all that apply): Treatment, Consult recommendations and Pending disposition  Discharge Plan A: Home Health "   Discharge Delays: None known at this time              Dana Padilla MD  Department of Hospital Medicine   [facility]

## 2023-09-13 NOTE — SUBJECTIVE & OBJECTIVE
Interval History: NAEON. S/P left percutaneous nephrostomy tube change.  Poor appetite and protein supplementation added yesterday. Cx + Acinetobacter, E. Cloecae and E. Faecium.    Review of Systems   Constitutional:  Positive for activity change, appetite change and fatigue. Negative for chills.   HENT: Negative.     Eyes: Negative.    Respiratory:  Negative for shortness of breath and wheezing.    Cardiovascular:  Negative for chest pain and leg swelling.   Gastrointestinal:  Positive for abdominal pain and nausea. Negative for abdominal distention, blood in stool, constipation and diarrhea.   Genitourinary:  Negative for decreased urine volume, difficulty urinating, hematuria and pelvic pain.        Left nephrostomy tube      Musculoskeletal:  Negative for back pain.   Skin:  Negative for wound.   Neurological:  Negative for dizziness, syncope and headaches.   Psychiatric/Behavioral:  Negative for agitation. The patient is not nervous/anxious.      Objective:     Vital Signs (Most Recent):  Temp: 97.6 °F (36.4 °C) (09/13/23 1100)  Pulse: 65 (09/13/23 1100)  Resp: 16 (09/13/23 1100)  BP: 104/61 (09/13/23 1100)  SpO2: 99 % (09/13/23 1100) Vital Signs (24h Range):  Temp:  [0.6 °F (-17.4 °C)-97.7 °F (36.5 °C)] 97.6 °F (36.4 °C)  Pulse:  [61-65] 65  Resp:  [16-18] 16  SpO2:  [96 %-100 %] 99 %  BP: (101-116)/(53-61) 104/61     Weight: 70.8 kg (156 lb)  Body mass index is 23.04 kg/m².    Estimated Creatinine Clearance: 62.5 mL/min (based on SCr of 1 mg/dL).     Physical Exam  Vitals and nursing note reviewed.   Constitutional:       General: She is not in acute distress.     Appearance: She is ill-appearing (chronically ill appearing). She is not toxic-appearing or diaphoretic.   HENT:      Head: Normocephalic and atraumatic.      Mouth/Throat:      Mouth: Mucous membranes are moist.   Eyes:      General: No scleral icterus.     Conjunctiva/sclera: Conjunctivae normal.   Cardiovascular:      Rate and Rhythm: Normal  rate and regular rhythm.      Heart sounds: No murmur heard.  Pulmonary:      Effort: Pulmonary effort is normal. No respiratory distress.      Breath sounds: Normal breath sounds.   Abdominal:      General: There is no distension.      Palpations: Abdomen is soft.      Tenderness: There is abdominal tenderness. There is right CVA tenderness. There is no left CVA tenderness.      Comments: Ileostomy in place   Urostomy - yellow urine   Genitourinary:     Comments: Left Nephrostomy in place - clear urine  Musculoskeletal:      Right lower leg: No edema.      Left lower leg: No edema.   Skin:     General: Skin is warm and dry.   Neurological:      Mental Status: She is alert and oriented to person, place, and time.   Psychiatric:         Mood and Affect: Mood normal.         Behavior: Behavior normal.          Significant Labs: CBC:   Recent Labs   Lab 09/11/23 1921 09/12/23  0704 09/13/23  0520   WBC 6.28 4.23 3.50*   HGB 10.8* 11.3* 11.1*   HCT 35.3* 36.5* 36.9*    265 270       CMP:   Recent Labs   Lab 09/11/23 1921 09/12/23  0704 09/13/23  0520    142 140   K 3.5 3.5 3.5   * 115* 114*   CO2 21* 18* 18*   GLU 94 88 89   BUN 33* 23* 14   CREATININE 1.3 1.1 1.0   CALCIUM 8.7 8.5* 8.6*   PROT 6.9 6.4 6.1   ALBUMIN 2.8* 2.6* 2.6*   BILITOT 0.4 0.4 0.3   ALKPHOS 97 92 100   AST 30 48* 50*   ALT 25 31 45*   ANIONGAP 9 9 8       Microbiology Results (last 7 days)       Procedure Component Value Units Date/Time    Urine culture [[id number]]  (Abnormal)  (Susceptibility) Collected: 09/09/23 1416    Order Status: Completed Specimen: Urine Updated: 09/13/23 1356     Urine Culture, Routine ENTEROBACTER CLOACAE  >100,000 cfu/ml        ACINETOBACTER BAUMANNII/HAEMOLYTICUS  50,000 - 99,999 cfu/ml  Susceptibility pending      Narrative:      Specimen Source->Urine    Blood Culture #2 **CANNOT BE ORDERED STAT** [[id number]] Collected: 09/09/23 1806    Order Status: Completed Specimen: Blood from Peripheral,  Hand, Left Updated: 09/12/23 2012     Blood Culture, Routine No Growth to date      No Growth to date      No Growth to date      No Growth to date    Blood Culture #1 **CANNOT BE ORDERED STAT** [[id number]] Collected: 09/09/23 1807    Order Status: Completed Specimen: Blood from Peripheral, Hand, Right Updated: 09/12/23 2012     Blood Culture, Routine No Growth to date      No Growth to date      No Growth to date      No Growth to date    Urine culture [[id number]]  (Abnormal) Collected: 09/11/23 1226    Order Status: Completed Specimen: Urine, Nephrostomy Tube, Left Updated: 09/12/23 1153     Urine Culture, Routine ENTEROCOCCUS FAECIUM  10,000 - 49,999 cfu/ml  Susceptibility pending  No other significant isolate      Narrative:      Indicated criteria for high risk culture:->Prior to urologic  procedures    Urine culture [[id number]]     Order Status: Canceled Specimen: Urine, Nephrostomy Tube, Left           Recent Lab Results         09/13/23  0520   09/12/23  1937   09/12/23  1531        Albumin 2.6           Alkaline Phosphatase 100           ALT 45           Anion Gap 8           AST 50           Baso # 0.03           Basophil % 0.9           BILIRUBIN TOTAL 0.3  Comment: For infants and newborns, interpretation of results should be based  on gestational age, weight and in agreement with clinical  observations.    Premature Infant recommended reference ranges:  Up to 24 hours.............<8.0 mg/dL  Up to 48 hours............<12.0 mg/dL  3-5 days..................<15.0 mg/dL  6-29 days.................<15.0 mg/dL             BUN 14           Calcium 8.6           Chloride 114           CO2 18           CPK 68           Creatinine 1.0           Differential Method Automated           eGFR >60.0           Eos # 0.1           Eosinophil % 3.1           Glucose 89           Gran # (ANC) 1.5           Gran % 42.8           Hematocrit 36.9           Hemoglobin 11.1           Immature Grans (Abs) 0.01  Comment:  Mild elevation in immature granulocytes is non specific and   can be seen in a variety of conditions including stress response,   acute inflammation, trauma and pregnancy. Correlation with other   laboratory and clinical findings is essential.             Immature Granulocytes 0.3           Lymph # 1.2           Lymph % 34.6           Magnesium  1.5           MCH 26.8           MCHC 30.1           MCV 89           Mono # 0.6           Mono % 18.3           MPV 10.5           nRBC 0           Phosphorus 2.8           Platelets 270           POCT Glucose   139   99       Potassium 3.5           PROTEIN TOTAL 6.1           RBC 4.14           RDW 14.6           Sodium 140           WBC 3.50                   Significant Imaging:   Imaging Results              CT Abdomen Pelvis  Without Contrast (Final result)  Result time 09/09/23 20:07:17      Final result by Vinayak Baer DO (09/09/23 20:07:17)                   Impression:      1. Postoperative changes of urinary diversion with a colon conduit and left lower quadrant colostomy.  Continued moderate right-sided hydronephrosis with mild wall thickening of the right proximal ureter.  Recommend correlation with urinalysis to exclude pyelitis.  2. Left-sided percutaneous nephrostomy tube in place.  No left-sided hydronephrosis.  3. Postoperative changes of partial colectomy with a right lower quadrant ileostomy.  No bowel obstruction or parastomal hernia.      Electronically signed by: Vinayak Baer  Date:    09/09/2023  Time:    20:07               Narrative:    EXAMINATION:  CT ABDOMEN PELVIS WITHOUT CONTRAST    CLINICAL HISTORY:  Abdominal abscess/infection suspected;Nausea/vomiting;Bowel obstruction suspected;    TECHNIQUE:  Multiplanar images were obtained of the abdomen and pelvis from the hemidiaphragms through the symphysis pubis without intravenous contrast.    COMPARISON:  CT of the abdomen and pelvis from 08/18/2023.    FINDINGS:  Lung Bases:  Clear.    Heart: Heart size is normal.  No pericardial effusion.    Liver: Normal in size and attenuation without focal hepatic lesion.    Biliary tract: There is mild prominence of the common bile duct, stable from prior and likely related to cholecystectomy changes.  No intrahepatic biliary ductal a shin.    Gallbladder: Surgically absent.    Pancreas: Normal. No pancreatic ductal dilatation.    Spleen: Normal size without focal lesion.    Adrenals: Normal.    Kidneys and urinary collecting systems: There are postoperative changes of urinary diversion with a colon conduit and left lower quadrant colostomy.  There is a left-sided percutaneous nephrostomy tube with the pigtail in the renal collecting system.  There is no left-sided hydronephrosis.  There is a moderate right-sided hydronephrosis, relatively stable in comparison with prior.    Lymph nodes: None enlarged.    Stomach and bowel: The stomach is normal.  There are postop changes of partial colectomy with a right lower quadrant and ileostomy.  There is no bowel obstruction.  There is no peristomal hernia.    Peritoneum and mesentery: No ascites or free intraperitoneal air. No abdominal fluid collection.  Multiple surgical clips noted in the pelvis and lower abdomen.    Vasculature: Normal.    Urinary bladder: The urinary bladder is contracted    Reproductive organs: The uterus is absent.    Body wall: No abnormality.    Musculoskeletal: No aggressive osseous lesion.                        Final result by Vinayak Baer DO (09/09/23 20:07:17)                   Impression:      1. Postoperative changes of urinary diversion with a colon conduit and left lower quadrant colostomy.  Continued moderate right-sided hydronephrosis with mild wall thickening of the right proximal ureter.  Recommend correlation with urinalysis to exclude pyelitis.  2. Left-sided percutaneous nephrostomy tube in place.  No left-sided hydronephrosis.  3. Postoperative changes of  partial colectomy with a right lower quadrant ileostomy.  No bowel obstruction or parastomal hernia.      Electronically signed by: Vinayak Baer  Date:    09/09/2023  Time:    20:07               Narrative:    EXAMINATION:  CT ABDOMEN PELVIS WITHOUT CONTRAST    CLINICAL HISTORY:  Abdominal abscess/infection suspected;Nausea/vomiting;Bowel obstruction suspected;    TECHNIQUE:  Multiplanar images were obtained of the abdomen and pelvis from the hemidiaphragms through the symphysis pubis without intravenous contrast.    COMPARISON:  CT of the abdomen and pelvis from 08/18/2023.    FINDINGS:  Lung Bases: Clear.    Heart: Heart size is normal.  No pericardial effusion.    Liver: Normal in size and attenuation without focal hepatic lesion.    Biliary tract: There is mild prominence of the common bile duct, stable from prior and likely related to cholecystectomy changes.  No intrahepatic biliary ductal a shin.    Gallbladder: Surgically absent.    Pancreas: Normal. No pancreatic ductal dilatation.    Spleen: Normal size without focal lesion.    Adrenals: Normal.    Kidneys and urinary collecting systems: There are postoperative changes of urinary diversion with a colon conduit and left lower quadrant colostomy.  There is a left-sided percutaneous nephrostomy tube with the pigtail in the renal collecting system.  There is no left-sided hydronephrosis.  There is a moderate right-sided hydronephrosis, relatively stable in comparison with prior.    Lymph nodes: None enlarged.    Stomach and bowel: The stomach is normal.  There are postop changes of partial colectomy with a right lower quadrant and ileostomy.  There is no bowel obstruction.  There is no peristomal hernia.    Peritoneum and mesentery: No ascites or free intraperitoneal air. No abdominal fluid collection.  Multiple surgical clips noted in the pelvis and lower abdomen.    Vasculature: Normal.    Urinary bladder: The urinary bladder is contracted    Reproductive  organs: The uterus is absent.    Body wall: No abnormality.    Musculoskeletal: No aggressive osseous lesion.

## 2023-09-13 NOTE — ASSESSMENT & PLAN NOTE
Patient with acute kidney injury/acute renal failure. Differential is broad, consider post-renal due to known history of ureteral obstruction. Consider pre-renal/intrarenal in setting of active infection, though she is currently hemodynamically stable and withought leukocytosis, decreasing concern for sepsis/hypoperfusion. Not currently taking any nephrotoxic medications.   ODESSA is currently stable. Baseline creatinine 1.1 - Labs reviewed- Renal function/electrolytes with Estimated Creatinine Clearance: 62.5 mL/min (based on SCr of 1 mg/dL). according to latest data. Monitor urine output and serial BMP and adjust therapy as needed. Avoid nephrotoxins and renally dose meds for GFR listed above.    CT AP w continued moderate R sided hydronephrosis w mild wall thickening of R prox ureter; L sided percutaneous nephrostomy tube in place; no L sided hydro    Plan:  - Will defer RP US at this time given visualization of kidneys on CT  - F/u urine lytes  - S/p 500cc bolus in ED, will administer another 500cc  - L nephrostomy replaced today 9/11/23  - Cr improving, back at baseline (1.0 today)  - replace serum lytes PRN  - encourage OOB

## 2023-09-13 NOTE — PLAN OF CARE
Problem: Violence Risk or Actual  Goal: Anger and Impulse Control  Outcome: Ongoing, Progressing     Problem: Violence Risk or Actual  Goal: Anger and Impulse Control  Outcome: Ongoing, Progressing     Problem: Adult Inpatient Plan of Care  Goal: Absence of Hospital-Acquired Illness or Injury  Outcome: Ongoing, Progressing

## 2023-09-13 NOTE — ASSESSMENT & PLAN NOTE
This is a 58 year old female with complicated medical history including cervical cancer in 2014 s/p chemotherapy and XRT who then developed ureteral stricture 2/2 XRT s/p bilateral nephrostomy tubes (R nephrostomy tube now removed, L tube remains in place) and transverse colon-urinary conduit in 2020, which was c/b bowel perforation requiring right hemicolectomy and ostomy creation, all of which has been complicated by recurrent MDR infections presenting for worsening fatigue and abdominal pain x 2 weeks. No changes in consistency or frequency of ileostomy/urostomy/nephrostomy output.    - She has had significant ID history, including multiple MDR UTIs ( Enterobacter cloacae 5/30, Klebsiella penumoniae ESBL 5/11, E faecium VRE 4/18, and Klebsiella pneumoniae ESBL 2/14). She has received ertapenem and meropenem in the past. Blood cultures in 2020 also with yeast requiring micafungin.   - On admission, she is HDS and without leukocytosis (WBC 11)  - UA w 3+ leuks but negative nitrites and only occasional bacteria. Not overwhelmingly concerning for infection, however given her history and imaging findings, will continue with treatment    Plan:  - Initiate ertapenem while awaiting further results  - F/u urine and blood cultures   - blood cx 9/9/23 NGTD   - urine cx 9/9/23 with Enterobacter cloacae and acinetobacter baumannii/haemolyticus    - urine cx 9/11/23 post nephrostomy exchange: Enterococcus Faecium, IV daptomycin started per ID recs 9/12/23  - F/u ID and urology consults, appreciate assistance  - Given overall hemodynamic stability, concern for sepsis is low. Will defer lactate at this time, however can consider addition to AM labs if indicated    Patient stable, ID recs for outpatient abx regimen per note 9/13/23. Tentative plan for discharge tomorrow.    Transition IV Ertapenem to PO Ciprofloxacin for E. Cloacae/Acinetobacter for a total antibiotic duration of 14 days from nephrostomy tube exchange (end date  9/25/23)   Continue IV Daptomycin for E. faceium while inpatient. Unclear significance given low colony count, though would complete three day antibiotic course. If discharged, can complete treatment with Linezolid.   Day 1 = 9/12/23 17:30

## 2023-09-13 NOTE — PLAN OF CARE
Sw added [facility] infusion liaison, nursing and hospital medicine team to chat for possible dc today with abx needs at dc ? Will follow as needed.

## 2023-09-14 VITALS
DIASTOLIC BLOOD PRESSURE: 50 MMHG | RESPIRATION RATE: 16 BRPM | WEIGHT: 156 LBS | HEIGHT: 69 IN | SYSTOLIC BLOOD PRESSURE: 102 MMHG | HEART RATE: 69 BPM | OXYGEN SATURATION: 99 % | TEMPERATURE: 98 F | BODY MASS INDEX: 23.11 KG/M2

## 2023-09-14 LAB
ALBUMIN SERPL BCP-MCNC: 2.5 G/DL (ref 3.5–5.2)
ALP SERPL-CCNC: 115 U/L (ref 55–135)
ALT SERPL W/O P-5'-P-CCNC: 36 U/L (ref 10–44)
ANION GAP SERPL CALC-SCNC: 6 MMOL/L (ref 8–16)
AST SERPL-CCNC: 30 U/L (ref 10–40)
BACTERIA BLD CULT: NORMAL
BACTERIA BLD CULT: NORMAL
BACTERIA UR CULT: ABNORMAL
BASOPHILS # BLD AUTO: 0.03 K/UL (ref 0–0.2)
BASOPHILS NFR BLD: 0.7 % (ref 0–1.9)
BILIRUB SERPL-MCNC: 0.2 MG/DL (ref 0.1–1)
BUN SERPL-MCNC: 14 MG/DL (ref 6–20)
CALCIUM SERPL-MCNC: 8.6 MG/DL (ref 8.7–10.5)
CHLORIDE SERPL-SCNC: 114 MMOL/L (ref 95–110)
CO2 SERPL-SCNC: 19 MMOL/L (ref 23–29)
CREAT SERPL-MCNC: 1 MG/DL (ref 0.5–1.4)
DIFFERENTIAL METHOD: ABNORMAL
EOSINOPHIL # BLD AUTO: 0.2 K/UL (ref 0–0.5)
EOSINOPHIL NFR BLD: 4.5 % (ref 0–8)
ERYTHROCYTE [DISTWIDTH] IN BLOOD BY AUTOMATED COUNT: 14.6 % (ref 11.5–14.5)
EST. GFR  (NO RACE VARIABLE): >60 ML/MIN/1.73 M^2
GLUCOSE SERPL-MCNC: 90 MG/DL (ref 70–110)
HCT VFR BLD AUTO: 37.1 % (ref 37–48.5)
HGB BLD-MCNC: 11.1 G/DL (ref 12–16)
IMM GRANULOCYTES # BLD AUTO: 0.02 K/UL (ref 0–0.04)
IMM GRANULOCYTES NFR BLD AUTO: 0.4 % (ref 0–0.5)
LYMPHOCYTES # BLD AUTO: 1.2 K/UL (ref 1–4.8)
LYMPHOCYTES NFR BLD: 26.8 % (ref 18–48)
MAGNESIUM SERPL-MCNC: 1.9 MG/DL (ref 1.6–2.6)
MCH RBC QN AUTO: 27.4 PG (ref 27–31)
MCHC RBC AUTO-ENTMCNC: 29.9 G/DL (ref 32–36)
MCV RBC AUTO: 92 FL (ref 82–98)
MONOCYTES # BLD AUTO: 0.7 K/UL (ref 0.3–1)
MONOCYTES NFR BLD: 14.7 % (ref 4–15)
NEUTROPHILS # BLD AUTO: 2.4 K/UL (ref 1.8–7.7)
NEUTROPHILS NFR BLD: 52.9 % (ref 38–73)
NRBC BLD-RTO: 0 /100 WBC
PHOSPHATE SERPL-MCNC: 2.3 MG/DL (ref 2.7–4.5)
PLATELET # BLD AUTO: 302 K/UL (ref 150–450)
PMV BLD AUTO: 10.6 FL (ref 9.2–12.9)
POTASSIUM SERPL-SCNC: 3.4 MMOL/L (ref 3.5–5.1)
PROT SERPL-MCNC: 6.1 G/DL (ref 6–8.4)
RBC # BLD AUTO: 4.05 M/UL (ref 4–5.4)
SODIUM SERPL-SCNC: 139 MMOL/L (ref 136–145)
WBC # BLD AUTO: 4.48 K/UL (ref 3.9–12.7)

## 2023-09-14 PROCEDURE — 84100 ASSAY OF PHOSPHORUS: CPT

## 2023-09-14 PROCEDURE — 99239 PR HOSPITAL DISCHARGE DAY,>30 MIN: ICD-10-PCS | Mod: ,,, | Performed by: STUDENT IN AN ORGANIZED HEALTH CARE EDUCATION/TRAINING PROGRAM

## 2023-09-14 PROCEDURE — 25000003 PHARM REV CODE 250: Performed by: PHYSICIAN ASSISTANT

## 2023-09-14 PROCEDURE — 99239 HOSP IP/OBS DSCHRG MGMT >30: CPT | Mod: ,,, | Performed by: STUDENT IN AN ORGANIZED HEALTH CARE EDUCATION/TRAINING PROGRAM

## 2023-09-14 PROCEDURE — 25000003 PHARM REV CODE 250

## 2023-09-14 PROCEDURE — 63600175 PHARM REV CODE 636 W HCPCS: Performed by: PHYSICIAN ASSISTANT

## 2023-09-14 PROCEDURE — 85025 COMPLETE CBC W/AUTO DIFF WBC: CPT

## 2023-09-14 PROCEDURE — 80053 COMPREHEN METABOLIC PANEL: CPT

## 2023-09-14 PROCEDURE — 63600175 PHARM REV CODE 636 W HCPCS

## 2023-09-14 PROCEDURE — 83735 ASSAY OF MAGNESIUM: CPT

## 2023-09-14 PROCEDURE — 36415 COLL VENOUS BLD VENIPUNCTURE: CPT

## 2023-09-14 RX ORDER — SODIUM,POTASSIUM PHOSPHATES 280-250MG
2 POWDER IN PACKET (EA) ORAL ONCE
Status: COMPLETED | OUTPATIENT
Start: 2023-09-14 | End: 2023-09-14

## 2023-09-14 RX ORDER — CIPROFLOXACIN 750 MG/1
750 TABLET, FILM COATED ORAL EVERY 12 HOURS
Qty: 22 TABLET | Refills: 0 | Status: SHIPPED | OUTPATIENT
Start: 2023-09-14 | End: 2023-09-14 | Stop reason: SDUPTHER

## 2023-09-14 RX ORDER — CIPROFLOXACIN 750 MG/1
750 TABLET, FILM COATED ORAL EVERY 12 HOURS
Qty: 22 TABLET | Refills: 0 | Status: SHIPPED | OUTPATIENT
Start: 2023-09-14 | End: 2023-09-18

## 2023-09-14 RX ADMIN — POTASSIUM & SODIUM PHOSPHATES POWDER PACK 280-160-250 MG 2 PACKET: 280-160-250 PACK at 10:09

## 2023-09-14 RX ADMIN — HEPARIN SODIUM 5000 UNITS: 5000 INJECTION INTRAVENOUS; SUBCUTANEOUS at 05:09

## 2023-09-14 RX ADMIN — ERTAPENEM 1 G: 1 INJECTION INTRAMUSCULAR; INTRAVENOUS at 05:09

## 2023-09-14 RX ADMIN — HEPARIN SODIUM 5000 UNITS: 5000 INJECTION INTRAVENOUS; SUBCUTANEOUS at 02:09

## 2023-09-14 RX ADMIN — ACETAMINOPHEN 650 MG: 325 TABLET ORAL at 05:09

## 2023-09-14 RX ADMIN — ACETAMINOPHEN 650 MG: 325 TABLET ORAL at 12:09

## 2023-09-14 RX ADMIN — DAPTOMYCIN 565 MG: 350 INJECTION, POWDER, LYOPHILIZED, FOR SOLUTION INTRAVENOUS at 03:09

## 2023-09-14 RX ADMIN — POTASSIUM BICARBONATE 25 MEQ: 978 TABLET, EFFERVESCENT ORAL at 10:09

## 2023-09-14 NOTE — NURSING
Nurses Note -- 4 Eyes      9/13/2023   10:27 PM      Skin assessed during: Q Shift Change      [] No Altered Skin Integrity Present    []Prevention Measures Documented      [] Yes- Altered Skin Integrity Present or Discovered   [] LDA Added if Not in Epic (Describe Wound)   [] New Altered Skin Integrity was Present on Admit and Documented in LDA   [] Wound Image Taken    Wound Care Consulted? No    Attending Nurse:  Lindy Antunez Lpn    Second RN/Staff Member:  Betty REYES

## 2023-09-14 NOTE — ASSESSMENT & PLAN NOTE
History of cervical cancer w mets to LN, s/p chemo and XRT.  Previously followed with Dr. Lemon in gyn/onc, last visit Dec 2020. Was supposed to RTC in 3 months, however appears to have been lost to follow up.   Ambulatory referral placed 9/8/23 for outpatient gyn/onc follow up

## 2023-09-14 NOTE — ASSESSMENT & PLAN NOTE
She is established and follows with outpatient urology. Her nephrostomy tube was last exchanged in June. She was treated with an empiric course of Cefdinir 7/26, which she states she recently completed.  - See pyelitis  - Urology consult --> non urgent replacement of L nephrostomy, IR completed procedure 9/11/23  - nephrostomy with CYU today  - added scheduled tylenol 650mg q6h for pain   - urology scheduled OP follow-up appointment

## 2023-09-14 NOTE — ASSESSMENT & PLAN NOTE
Pt required PEC/CEC 6/4-6/10 of this year due to SI in setting of MDD and complex bereavement (daughter committed suicide May 2023). This was rescinded when she no longer posed as a threat to herself.   Remeron is listed as an outpatient medication however she states she is not taking it. Denies SI at this time.     - Low threshold to engage psychiatry and/or resume Remeron if indicated  - ambulatory referral for outpatient psychology

## 2023-09-14 NOTE — NURSING
Patient to be discharged home. Spoke with , Hammad, about discharge and instructions. Medications delivered to bedside. Medications and discharge instructions put with patient's belongings. Patient in room waiting for transportation.

## 2023-09-14 NOTE — DISCHARGE SUMMARY
"[facility] Medicine  Discharge Summary      Patient Name: Edita Riley  MRN: [id number]  LUIZ: [id number]  Patient Class: IP- Inpatient  Admission Date: 9/9/2023  Hospital Length of Stay: 5 days  Discharge Date and Time:  09/14/2023 3:22 PM  Attending Physician: Fransisco Whiteside MD   Discharging Provider: Dana Padilla MD  Primary Care Provider: Delma Primary Doctor  [facility] Medicine Team: [facility] 1 Dana Padilla MD  Primary Care Team: [facility] 1    HPI:   This is a 58 year old female with complicated medical history including cervical cancer in 2014 w recurrence in 2020 s/p chemotherapy and XRT, c/b bilateral ureteral strictures s/p bilateral nephrostomy tubes (R nephrostomy tube now removed, L tube remains in place) and transverse colon-urinary conduit in 2020 (this was also c/b bowel perforation requiring right hemicolectomy and ostomy creation), all of which has been complicated by recurrent MDR infections, as well as CVA at the age of 7, emphysema, htn, DVT of LE in 2020 no longer on treatment, and psychiatric comorbidities requiring prior PEC. She is presenting for worsening fatigue and abdominal pain. When asked how long she has been feeling poorly, she states "62 weeks", however when further prompted, she notes it has been exacerbated over the last 2 weeks. Her  has been ill with some sort of flu-like illness (pt unable to specify further), which she originally thought she caught from him. Today she vomited once, which prompted presentation to the ED. She describes the abdominal pain as "sharp" and located in the center of her abdomen. No aggravating or alleviating factors. Associated symptoms include subjective fever, though when she checked her temperature at home it was 98-99. She denies shortness of breath, cough, or changes in consistency or frequency of ileostomy/nephrostomy output. She has not noticed any new or worsening wounds.     She is established " "and follows with outpatient urology. Her nephrostomy tube was last exchanged in June, and there are plans for exchange and subsequent revision this month. She was treated with an empiric course of Cefdinir 7/26, which she states she recently completed. Regarding her ID history, she has had multiple MDR UTIs including Enterobacter cloacae 5/30, Klebsiella penumoniae ESBL 5/11, E faecium VRE 4/18, and Klebsiella pneumoniae ESBL 2/14. She has received ertapenem and meropenem in the past. Blood cultures in 2020 also with yeast requiring micafungin.     In the ED, she was afebrile and HDS on RA. WBC 11.03. Labs also notable for significant ODESSA w BUN 66 and Cr 3.7 (recent baseline ~1.1). UA w 2+ protein, 1+ occult blood, negative nitrites, 3+ leuks, > 100 WBCs, occasional bacteria. CT AP notable for known R sided hydronephrosis w mild wall thickening of proximal R ureter, and possible pyelitis. She was treated with a 1/2L CC fluid bolus and a dose of CTX, and admitted to hospital medicine for further evaluation.       * No surgery found *      Hospital Course:   Patient presented for worsening fatigue and abdominal pain over the last 2 weeks. She vomited once, which prompted presentation to the ED. She describes the abdominal pain as "sharp" and located in the center of her abdomen. No aggravating or alleviating factors. Associated symptoms include subjective fever, though when she checked her temperature at home it was 98-99. She denies shortness of breath, cough, or changes in consistency or frequency of ileostomy/nephrostomy output. She has not noticed any new or worsening wounds.     ODESSA noted on labs, BUN 66 and Cr 3.7 with UTI on UA. CT AP notable for known R sided hydronephrosis w mild wall thickening of proximal R ureter, and possible pyelitis. Urine culture with enterobacter, enterococcus and acinetobacter. ID consulted, patient initially treated with ceftriaxone then changed to ertapenem and daptomycin " "throughout her hospital course. Received a L nephrostomy exchange on 9/11/23. Last dose of daptomycin completed 9/14/23. ODESSA resolved with treatment, back at baseline Cr (1.1) since 9/12/23. Stable for discharge with PO ciprofloxacin to be continued until 9/25/23, home medications, ambulatory referrals for gyn/onc and psychology.        Goals of Care Treatment Preferences:  Code Status: Full Code      Consults:   Consults (From admission, onward)        Status Ordering Provider     Inpatient consult to Interventional Radiology  Once        Provider:  (Not yet assigned)    Completed BRANDON IBANEZ     Inpatient consult to PICC team (NIAS)  Once        Provider:  (Not yet assigned)    Completed CODY CINTRON     IP consult to case management  Once        Provider:  (Not yet assigned)    Completed CODY CINTRON     Inpatient consult to Urology  Once        Provider:  (Not yet assigned)    Completed THALIA POST     Inpatient consult to Infectious Diseases  Once        Provider:  (Not yet assigned)    Completed THALIA POST          Neuro  Seizure-like activity  Per PCP note in April: "Had not seizure in a long time - - Sounds like Medication related   No vascular stenting"  - Noted, carbapenems lower seizure threshold, however at this time benefits of treatment appear to outweigh risks. Has tolerated ertapenem in the past.       Psychiatric  History of suicidal ideation  Pt required PEC/CEC 6/4-6/10 of this year due to SI in setting of MDD and complex bereavement (daughter committed suicide May 2023). This was rescinded when she no longer posed as a threat to herself.   Remeron is listed as an outpatient medication however she states she is not taking it. Denies SI at this time.     - Low threshold to engage psychiatry and/or resume Remeron if indicated  - ambulatory referral for outpatient psychology       Pulmonary  Emphysema of lung  Mild diffuse emphysematous changes noted on Chest CT 2020. Has never smoked " tobacco. CXR on admission stable.  - Consider addition of albuterol nebs/breathing treatments if indicated          Cardiac/Vascular  QT prolongation  QTc on admission 451 ms.   Documented QTc of 605 ms in April 2021.   Cautious utilization of QT prolonging medications.       History of essential hypertension  History of htn per chart review, does not currently take any outpatient antihypertensives  - Continue to monitor      Renal/  * Pyelitis  This is a 58 year old female with complicated medical history including cervical cancer in 2014 s/p chemotherapy and XRT who then developed ureteral stricture 2/2 XRT s/p bilateral nephrostomy tubes (R nephrostomy tube now removed, L tube remains in place) and transverse colon-urinary conduit in 2020, which was c/b bowel perforation requiring right hemicolectomy and ostomy creation, all of which has been complicated by recurrent MDR infections presenting for worsening fatigue and abdominal pain x 2 weeks. No changes in consistency or frequency of ileostomy/urostomy/nephrostomy output.    - She has had significant ID history, including multiple MDR UTIs ( Enterobacter cloacae 5/30, Klebsiella penumoniae ESBL 5/11, E faecium VRE 4/18, and Klebsiella pneumoniae ESBL 2/14). She has received ertapenem and meropenem in the past. Blood cultures in 2020 also with yeast requiring micafungin.   - On admission, she is HDS and without leukocytosis (WBC 11)  - UA w 3+ leuks but negative nitrites and only occasional bacteria. Not overwhelmingly concerning for infection, however given her history and imaging findings, will continue with treatment    Plan:  - Initiate ertapenem while awaiting further results  - F/u urine and blood cultures   - blood cx 9/9/23 NGTD   - urine cx 9/9/23 with Enterobacter cloacae and acinetobacter baumannii/haemolyticus    - urine cx 9/11/23 post nephrostomy exchange: Enterococcus Faecium, IV daptomycin started per ID recs 9/12/23  - F/u ID and urology  consults, appreciate assistance  - Given overall hemodynamic stability, concern for sepsis is low. Will defer lactate at this time, however can consider addition to AM labs if indicated    Patient stable, ID recs for outpatient abx regimen per note 9/13/23. Tentative plan for discharge tomorrow.    Transition IV Ertapenem to PO Ciprofloxacin for E. Cloacae/Acinetobacter for a total antibiotic duration of 14 days from nephrostomy tube exchange (end date 9/25/23)   Continue IV Daptomycin for E. faceium while inpatient. Unclear significance given low colony count, though would complete three day antibiotic course. If discharged, can complete treatment with Linezolid.   Day 1 = 9/12/23 17:30        Plan for discharge this afternoon after final dose of daptomycin. PO cipro 750mg BID until 9/24/23 ordered.       CKD (chronic kidney disease), stage III  Creatinine 3.7 on admit, baseline around 1.1  GFR on admit 13.5, baseline around 50    Plan:   Lab Results   Component Value Date    CREATININE 1.0 09/14/2023     - See ODESSA  - Avoid nephrotoxic agents such as NSAIDs, gadolinium and IV radiocontrast.  - Renally dose meds to current GFR.  - Maintain MAP > 65.        Nephrostomy status  She is established and follows with outpatient urology. Her nephrostomy tube was last exchanged in June. She was treated with an empiric course of Cefdinir 7/26, which she states she recently completed.  - See pyelitis  - Urology consult --> non urgent replacement of L nephrostomy, IR completed procedure 9/11/23  - nephrostomy with CYU today  - added scheduled tylenol 650mg q6h for pain   - urology scheduled OP follow-up appointment      Presence of urostomy  See pyelitis      ODESSA (acute kidney injury)  Patient with acute kidney injury/acute renal failure. Differential is broad, consider post-renal due to known history of ureteral obstruction. Consider pre-renal/intrarenal in setting of active infection, though she is currently  hemodynamically stable and withought leukocytosis, decreasing concern for sepsis/hypoperfusion. Not currently taking any nephrotoxic medications.   ODESSA is currently stable. Baseline creatinine 1.1 - Labs reviewed- Renal function/electrolytes with Estimated Creatinine Clearance: 62.5 mL/min (based on SCr of 1 mg/dL). according to latest data. Monitor urine output and serial BMP and adjust therapy as needed. Avoid nephrotoxins and renally dose meds for GFR listed above.    CT AP w continued moderate R sided hydronephrosis w mild wall thickening of R prox ureter; L sided percutaneous nephrostomy tube in place; no L sided hydro    Plan:  - Will defer RP US at this time given visualization of kidneys on CT  - F/u urine lytes  - S/p 500cc bolus in ED, will administer another 500cc  - L nephrostomy replaced today 9/11/23  - Cr improving, back at baseline (1.0 today)  - replace serum lytes PRN  - encourage OOB      Hematology  History of DVT (deep vein thrombosis)  History of DVT in 2020, likely provoked due to prolonged/recurrent hospitalizations. Previously treated with Eliquis, no longer taking.     - DVT ppx w heparin  - held for IR procedure 9/11/23, restarted that evening      Oncology  Anemia due to chronic kidney disease  Baseline Hgb ~10-11. WNL at 15 on admission  - Daily CBC       History of cervical cancer  History of cervical cancer w mets to LN, s/p chemo and XRT.  Previously followed with Dr. Lemon in gyn/onc, last visit Dec 2020. Was supposed to RTC in 3 months, however appears to have been lost to follow up.   Ambulatory referral placed 9/8/23 for outpatient gyn/onc follow up       GI  Ileostomy in place  See pyelitis      Palliative Care  Refusal of blood transfusions as patient is [employer]  Noted. No concerns for blood loss at this time, do not anticipate need for transfusion         Final Active Diagnoses:    Diagnosis Date Noted POA    PRINCIPAL PROBLEM:  Pyelitis [N12] 09/09/2023 Yes     History of suicidal ideation [Z86.59] 09/10/2023 Not Applicable    Hyponatremia [E87.1] 09/10/2023 Yes    Refusal of blood transfusions as patient is L[employer] [Z53.1] 04/10/2023 Not Applicable    Emphysema of lung [J43.9] 04/10/2023 Yes    CKD (chronic kidney disease), stage III [N18.30] 02/14/2023 Yes    Nephrostomy status [Z93.6]  Not Applicable     Chronic    High anion gap metabolic acidosis [E87.29] 04/17/2021 Yes    QT prolongation [R94.31] 04/16/2021 Yes    Presence of urostomy [Z93.6] 02/08/2021 Not Applicable    History of DVT (deep vein thrombosis) [Z86.718] 02/03/2021 Not Applicable     Chronic    Ileostomy in place [Z93.2]  Not Applicable     Chronic    Anemia due to chronic kidney disease [N18.9, D63.1] 05/18/2020 Yes    ODESSA (acute kidney injury) [N17.9] 03/21/2020 Yes    Seizure-like activity [R56.9] 12/02/2018 Yes    History of cervical cancer [Z85.41] 10/11/2018 Not Applicable     Chronic    History of essential hypertension [Z86.79] 05/04/2015 Not Applicable      Problems Resolved During this Admission:       Discharged Condition: stable    Disposition: Home or Self Care    Follow Up:   Follow-up Information     No, Primary Doctor Follow up.           Leslie Balbuena MD Follow up on 9/21/2023.    Specialty: Urology  Why: Follow up  Contact information:  [address] [place] [id number]  [phone]                       Patient Instructions:      Ambulatory referral/consult to Psychology   Standing Status: Future   Referral Priority: Routine Referral Type: Psychiatric   Referral Reason: Specialty Services Required   Requested Specialty: Psychology   Number of Visits Requested: 1       Significant Diagnostic Studies: Microbiology:   Urine Culture    Lab Results   Component Value Date    LABURIN (A) 09/11/2023     ENTEROCOCCUS FAECIUM VRE  10,000 - 49,999 cfu/ml  No other significant isolate         Pending Diagnostic Studies:     None          Medications:  Reconciled Home Medications:      Medication List      START taking these medications    ciprofloxacin HCl 750 MG tablet  Commonly known as: CIPRO  Take 1 tablet (750 mg total) by mouth every 12 (twelve) hours. for 11 days        CONTINUE taking these medications    gabapentin 100 MG capsule  Commonly known as: NEURONTIN  Take 2 capsules (200 mg total) by mouth every evening.     loratadine 10 mg tablet  Commonly known as: CLARITIN  Take 10 mg by mouth once daily.     mirtazapine 30 MG tablet  Commonly known as: REMERON  Take 1 tablet (30 mg total) by mouth nightly.     oxyCODONE 5 MG immediate release tablet  Commonly known as: ROXICODONE  Take 1 tablet (5 mg total) by mouth every 12 (twelve) hours as needed for Pain.     tolterodine 2 MG Cp24  Commonly known as: DETROL LA  Take 2 mg by mouth once daily.            Indwelling Lines/Drains at time of discharge:   Lines/Drains/Airways     Drain  Duration                Urostomy 09/11/20 0000 ileal conduit LLQ 1098 days         Ileostomy 09/18/20 RLQ 1091 days         Nephrostomy 09/11/23 1011 Left 12 Fr. 3 days                Time spent on the discharge of patient: >60 minutes         Dana Padilla MD  Department of Hospital Medicine  [facility] Surg

## 2023-09-14 NOTE — SUBJECTIVE & OBJECTIVE
Interval History: Patient with SBP in 90s overnight, NS bolus ordered. HDS this morning. Patient reports that her pain is well controlled with tylenol, denies any CP, SOB, lightheadedness. She endorses recent lack of appetite, anxiety and stress related to the passing of her daughters earlier this year.    Review of Systems   Constitutional:  Positive for appetite change, fatigue and fever. Negative for chills.   HENT:  Negative for sore throat and trouble swallowing.    Eyes:  Negative for visual disturbance.   Respiratory:  Negative for chest tightness, shortness of breath and wheezing.    Cardiovascular:  Negative for chest pain and leg swelling.   Gastrointestinal:  Positive for abdominal pain, nausea and vomiting. Negative for abdominal distention, blood in stool, constipation and diarrhea.   Genitourinary:  Negative for decreased urine volume, difficulty urinating, hematuria and pelvic pain.   Musculoskeletal:  Negative for back pain.   Skin:  Negative for wound.   Neurological:  Negative for dizziness, syncope, light-headedness and headaches.   Psychiatric/Behavioral:  Negative for dysphoric mood. The patient is nervous/anxious.      Objective:     Vital Signs (Most Recent):  Temp: 97.6 °F (36.4 °C) (09/14/23 1511)  Pulse: 69 (09/14/23 1511)  Resp: 16 (09/14/23 1511)  BP: (!) 102/50 (09/14/23 1511)  SpO2: 99 % (09/14/23 1511) Vital Signs (24h Range):  Temp:  [97.5 °F (36.4 °C)-98.3 °F (36.8 °C)] 97.6 °F (36.4 °C)  Pulse:  [59-69] 69  Resp:  [16-18] 16  SpO2:  [96 %-99 %] 99 %  BP: ()/(50-59) 102/50     Weight: 70.8 kg (156 lb)  Body mass index is 23.04 kg/m².    Intake/Output Summary (Last 24 hours) at 9/14/2023 1515  Last data filed at 9/14/2023 1137  Gross per 24 hour   Intake 250 ml   Output 1475 ml   Net -1225 ml         Physical Exam  Vitals and nursing note reviewed.   Constitutional:       General: She is not in acute distress.     Appearance: She is ill-appearing.      Comments: Chronically ill  appearing female, no acute distress but in obvious discomfort   HENT:      Head: Normocephalic and atraumatic.   Eyes:      General: No scleral icterus.  Cardiovascular:      Rate and Rhythm: Normal rate and regular rhythm.      Heart sounds: No murmur heard.  Pulmonary:      Effort: Pulmonary effort is normal. No respiratory distress.      Breath sounds: Normal breath sounds. No wheezing or rales.   Abdominal:      General: Abdomen is flat. There is no distension.      Palpations: Abdomen is soft.      Tenderness: There is abdominal tenderness. There is guarding (voluntary).      Comments: Ileostomy bag in place with brown output  Urostomy and nephrostomy bag in place with yellow, non bloody urine  Ostomy sites do not appear irritated or inflamed  Abdomen diffusely tender to palpation  Unable to assess CVA tenderness due to body positioning and pt weakness limiting ability to participate   Musculoskeletal:      Cervical back: Normal range of motion. No rigidity.      Right lower leg: No edema.      Left lower leg: No edema.   Skin:     General: Skin is warm and dry.   Neurological:      General: No focal deficit present.      Mental Status: She is alert.      Motor: Weakness (diffuse) present.   Psychiatric:         Behavior: Behavior normal.         Thought Content: Thought content normal.             Significant Labs: All pertinent labs within the past 24 hours have been reviewed.  CBC:   Recent Labs   Lab 09/13/23  0520 09/14/23 0443   WBC 3.50* 4.48   HGB 11.1* 11.1*   HCT 36.9* 37.1    302     CMP:   Recent Labs   Lab 09/13/23  0520 09/14/23  0443    139   K 3.5 3.4*   * 114*   CO2 18* 19*   GLU 89 90   BUN 14 14   CREATININE 1.0 1.0   CALCIUM 8.6* 8.6*   PROT 6.1 6.1   ALBUMIN 2.6* 2.5*   BILITOT 0.3 0.2   ALKPHOS 100 115   AST 50* 30   ALT 45* 36   ANIONGAP 8 6*     Magnesium:   Recent Labs   Lab 09/13/23  0520 09/14/23  0443   MG 1.5* 1.9         Significant Imaging: I have reviewed all  pertinent imaging results/findings within the past 24 hours.

## 2023-09-14 NOTE — DISCHARGE INSTRUCTIONS
Follow up with Urology outpatient for recent nephrostomy exchange   Referral for gynecology/oncology for cervical cancer follow up  Referral for psychology     Please continue ciprofloxacin as instructed on prescription.

## 2023-09-14 NOTE — ASSESSMENT & PLAN NOTE
This is a 58 year old female with complicated medical history including cervical cancer in 2014 s/p chemotherapy and XRT who then developed ureteral stricture 2/2 XRT s/p bilateral nephrostomy tubes (R nephrostomy tube now removed, L tube remains in place) and transverse colon-urinary conduit in 2020, which was c/b bowel perforation requiring right hemicolectomy and ostomy creation, all of which has been complicated by recurrent MDR infections presenting for worsening fatigue and abdominal pain x 2 weeks. No changes in consistency or frequency of ileostomy/urostomy/nephrostomy output.    - She has had significant ID history, including multiple MDR UTIs ( Enterobacter cloacae 5/30, Klebsiella penumoniae ESBL 5/11, E faecium VRE 4/18, and Klebsiella pneumoniae ESBL 2/14). She has received ertapenem and meropenem in the past. Blood cultures in 2020 also with yeast requiring micafungin.   - On admission, she is HDS and without leukocytosis (WBC 11)  - UA w 3+ leuks but negative nitrites and only occasional bacteria. Not overwhelmingly concerning for infection, however given her history and imaging findings, will continue with treatment    Plan:  - Initiate ertapenem while awaiting further results  - F/u urine and blood cultures   - blood cx 9/9/23 NGTD   - urine cx 9/9/23 with Enterobacter cloacae and acinetobacter baumannii/haemolyticus    - urine cx 9/11/23 post nephrostomy exchange: Enterococcus Faecium, IV daptomycin started per ID recs 9/12/23  - F/u ID and urology consults, appreciate assistance  - Given overall hemodynamic stability, concern for sepsis is low. Will defer lactate at this time, however can consider addition to AM labs if indicated    Patient stable, ID recs for outpatient abx regimen per note 9/13/23. Tentative plan for discharge tomorrow.    Transition IV Ertapenem to PO Ciprofloxacin for E. Cloacae/Acinetobacter for a total antibiotic duration of 14 days from nephrostomy tube exchange (end date  9/25/23)   Continue IV Daptomycin for E. faceium while inpatient. Unclear significance given low colony count, though would complete three day antibiotic course. If discharged, can complete treatment with Linezolid.   Day 1 = 9/12/23 17:30        Plan for discharge this afternoon after final dose of daptomycin. PO cipro 750mg BID until 9/24/23 ordered.

## 2023-09-14 NOTE — PLAN OF CARE
Problem: Violence Risk or Actual  Goal: Anger and Impulse Control  Outcome: Ongoing, Progressing     Problem: Violence Risk or Actual  Goal: Anger and Impulse Control  Outcome: Ongoing, Progressing     Problem: Adult Inpatient Plan of Care  Goal: Plan of Care Review  Outcome: Ongoing, Progressing  Goal: Patient-Specific Goal (Individualized)  Outcome: Ongoing, Progressing  Goal: Absence of Hospital-Acquired Illness or Injury  Outcome: Ongoing, Progressing  Goal: Optimal Comfort and Wellbeing  Outcome: Ongoing, Progressing     Problem: Adult Inpatient Plan of Care  Goal: Plan of Care Review  Outcome: Ongoing, Progressing     Problem: Adult Inpatient Plan of Care  Goal: Optimal Comfort and Wellbeing  Outcome: Ongoing, Progressing

## 2023-09-14 NOTE — ASSESSMENT & PLAN NOTE
Creatinine 3.7 on admit, baseline around 1.1  GFR on admit 13.5, baseline around 50    Plan:   Lab Results   Component Value Date    CREATININE 1.0 09/14/2023     - See ODESSA  - Avoid nephrotoxic agents such as NSAIDs, gadolinium and IV radiocontrast.  - Renally dose meds to current GFR.  - Maintain MAP > 65.

## 2023-09-14 NOTE — PLAN OF CARE
[facility]  Discharge Final Note    Primary Care Provider: Delma, Primary Doctor    Expected Discharge Date: 9/14/2023    Final Discharge Note (most recent)       Final Note - 09/14/23 0829          Final Note    Assessment Type Final Discharge Note     Anticipated Discharge Disposition Home or Self Care     Hospital Resources/Appts/Education Provided Appointments scheduled and added to AVS        Post-Acute Status    Post-Acute Authorization Other     IV Infusion Status Referral(s) sent     Other Status No Post-Acute Service Needs     Discharge Delays None known at this time                     Important Message from Medicare             Contact Info       No, Primary Doctor   Relationship: PCP - General        Next Steps: Follow up    Leslie Balbuena MD   Specialty: Urology    [id number]  [facility] [id number]   Phone: [phone]       Next Steps: Follow up on 9/21/2023    Instructions: Follow up

## 2023-09-14 NOTE — HOSPITAL COURSE
"Patient presented for worsening fatigue and abdominal pain over the last 2 weeks. She vomited once, which prompted presentation to the ED. She describes the abdominal pain as "sharp" and located in the center of her abdomen. No aggravating or alleviating factors. Associated symptoms include subjective fever, though when she checked her temperature at home it was 98-99. She denies shortness of breath, cough, or changes in consistency or frequency of ileostomy/nephrostomy output. She has not noticed any new or worsening wounds.     ODESSA noted on labs, BUN 66 and Cr 3.7 with UTI on UA. CT AP notable for known R sided hydronephrosis w mild wall thickening of proximal R ureter, and possible pyelitis. Urine culture with enterobacter, enterococcus and acinetobacter. ID consulted, patient initially treated with ceftriaxone then changed to ertapenem and daptomycin throughout her hospital course. Received a L nephrostomy exchange on 9/11/23. Last dose of daptomycin completed 9/14/23. ODESSA resolved with treatment, back at baseline Cr (1.1) since 9/12/23. Stable for discharge with PO ciprofloxacin to be continued until 9/25/23, home medications, ambulatory referrals for gyn/onc and psychology.   "

## 2023-09-14 NOTE — NURSING
IVs discontinued with catheter tips intact. Priscilla transported patient off unit via stretcher with belongings.

## 2023-09-15 ENCOUNTER — PATIENT OUTREACH (OUTPATIENT)
Dept: ADMINISTRATIVE | Facility: CLINIC | Age: 58
End: 2023-09-15
Payer: MEDICAID

## 2023-09-15 NOTE — PROGRESS NOTES
C3 nurse spoke with Edita Riley  for a TCC post hospital discharge follow up call. The patient has a scheduled HOSFU appointment with Trina Vu on 9/18/23 @ 0900.

## 2023-09-18 ENCOUNTER — OFFICE VISIT (OUTPATIENT)
Dept: PRIMARY CARE CLINIC | Facility: CLINIC | Age: 58
End: 2023-09-18
Payer: MEDICAID

## 2023-09-18 VITALS
HEART RATE: 81 BPM | BODY MASS INDEX: 17.19 KG/M2 | WEIGHT: 116.38 LBS | SYSTOLIC BLOOD PRESSURE: 104 MMHG | DIASTOLIC BLOOD PRESSURE: 53 MMHG

## 2023-09-18 DIAGNOSIS — N17.9 AKI (ACUTE KIDNEY INJURY): ICD-10-CM

## 2023-09-18 DIAGNOSIS — F43.21 COMPLICATED GRIEF: ICD-10-CM

## 2023-09-18 DIAGNOSIS — N12 PYELONEPHRITIS: Primary | ICD-10-CM

## 2023-09-18 DIAGNOSIS — Z85.41 HISTORY OF CERVICAL CANCER: ICD-10-CM

## 2023-09-18 PROCEDURE — 1160F PR REVIEW ALL MEDS BY PRESCRIBER/CLIN PHARMACIST DOCUMENTED: ICD-10-PCS | Mod: CPTII,,, | Performed by: FAMILY MEDICINE

## 2023-09-18 PROCEDURE — 3078F PR MOST RECENT DIASTOLIC BLOOD PRESSURE < 80 MM HG: ICD-10-PCS | Mod: CPTII,,, | Performed by: FAMILY MEDICINE

## 2023-09-18 PROCEDURE — 3078F DIAST BP <80 MM HG: CPT | Mod: CPTII,,, | Performed by: FAMILY MEDICINE

## 2023-09-18 PROCEDURE — 99496 TRANSITIONAL CARE MANAGE SERVICE 7 DAY DISCHARGE: ICD-10-PCS | Mod: S$PBB,,, | Performed by: FAMILY MEDICINE

## 2023-09-18 PROCEDURE — 99999 PR PBB SHADOW E&M-EST. PATIENT-LVL IV: ICD-10-PCS | Mod: PBBFAC,,, | Performed by: FAMILY MEDICINE

## 2023-09-18 PROCEDURE — 99496 TRANSJ CARE MGMT HIGH F2F 7D: CPT | Mod: S$PBB,,, | Performed by: FAMILY MEDICINE

## 2023-09-18 PROCEDURE — 3074F SYST BP LT 130 MM HG: CPT | Mod: CPTII,,, | Performed by: FAMILY MEDICINE

## 2023-09-18 PROCEDURE — 99214 OFFICE O/P EST MOD 30 MIN: CPT | Mod: PBBFAC,PN | Performed by: FAMILY MEDICINE

## 2023-09-18 PROCEDURE — 1111F PR DISCHARGE MEDS RECONCILED W/ CURRENT OUTPATIENT MED LIST: ICD-10-PCS | Mod: CPTII,,, | Performed by: FAMILY MEDICINE

## 2023-09-18 PROCEDURE — 99999 PR PBB SHADOW E&M-EST. PATIENT-LVL IV: CPT | Mod: PBBFAC,,, | Performed by: FAMILY MEDICINE

## 2023-09-18 PROCEDURE — 1159F PR MEDICATION LIST DOCUMENTED IN MEDICAL RECORD: ICD-10-PCS | Mod: CPTII,,, | Performed by: FAMILY MEDICINE

## 2023-09-18 PROCEDURE — 1111F DSCHRG MED/CURRENT MED MERGE: CPT | Mod: CPTII,,, | Performed by: FAMILY MEDICINE

## 2023-09-18 PROCEDURE — 3008F BODY MASS INDEX DOCD: CPT | Mod: CPTII,,, | Performed by: FAMILY MEDICINE

## 2023-09-18 PROCEDURE — 3008F PR BODY MASS INDEX (BMI) DOCUMENTED: ICD-10-PCS | Mod: CPTII,,, | Performed by: FAMILY MEDICINE

## 2023-09-18 PROCEDURE — 1160F RVW MEDS BY RX/DR IN RCRD: CPT | Mod: CPTII,,, | Performed by: FAMILY MEDICINE

## 2023-09-18 PROCEDURE — 1159F MED LIST DOCD IN RCRD: CPT | Mod: CPTII,,, | Performed by: FAMILY MEDICINE

## 2023-09-18 PROCEDURE — 3074F PR MOST RECENT SYSTOLIC BLOOD PRESSURE < 130 MM HG: ICD-10-PCS | Mod: CPTII,,, | Performed by: FAMILY MEDICINE

## 2023-09-18 NOTE — PROGRESS NOTES
Transitional Care Note  Subjective:       Patient ID: Edita Riley is a 58 y.o. female.  Chief Complaint: Establish Care and Hospital Follow Up (For UTI)    Family and/or Caretaker present at visit?  No.  Diagnostic tests reviewed/disposition: No diagnosic tests pending after this hospitalization.  Disease/illness education: yes  Home health/community services discussion/referrals: Patient does not have home health established from hospital visit.  They do not need home health.  If needed, we will set up home health for the patient.   Establishment or re-establishment of referral orders for community resources:  psychiatry .   Discussion with other health care providers: No discussion with other health care providers necessary.   HPI:  Patient is a 58-year-old female here for follow-up hospital discharge for pyelonephritis.  Patient with recurrent MDR UTI.  She has status post nephrostomy, and has urostomy.  Patient presented with generalized abdominal pain and fever.  ID was consulted and patient initially treated with ceftriaxone then changed to ertapenem and daptomycin throughout her hospital course. Received a L nephrostomy exchange on 9/11/23.  She was also found to have ODESSA on initial labs but it was resolved with IV fluids.  Patient placed on p.o. ciprofloxacin at discharge.  She appears to be stable.  Outpatient referrals for gyn in psychology placed.  Patient with suicidal ideation several months ago following the suicide of both of her daughters, with the most recent suicide 4 months ago.  She states her mood has improved.  She no longer has suicidal ideation, but would like to consider counseling.  Review of Systems   Constitutional:  Negative for fatigue and fever.   Gastrointestinal:  Negative for abdominal pain.   Genitourinary:         Pertinent for increased urinary output   Psychiatric/Behavioral:  Positive for dysphoric mood. Negative for suicidal ideas.        Objective:      Physical  Exam  Constitutional:       Appearance: Normal appearance.   HENT:      Head: Atraumatic.   Pulmonary:      Breath sounds: Normal breath sounds.   Abdominal:      Comments: Ileostomy and urostomy site without complications.  Urostomy drainage tube intact.   Musculoskeletal:      Right lower leg: No edema.      Left lower leg: No edema.         Assessment:       1. Pyelonephritis    2. ODESSA (acute kidney injury)    3. History of cervical cancer    4. Complicated grief        Plan:     Edita was seen today for establish care and hospital follow up.    Diagnoses and all orders for this visit:    Pyelonephritis  Patient improve clinically.  Will complete p.o. ciprofloxacin.  Follow with urology for temporary drainage to be removed    ODESSA (acute kidney injury)  Kidney function has returned to baseline following IV fluids.  Patient with increased urinary output at this time.    History of cervical cancer  Patient with a history of cervical cancer.  Will refer to gyn for follow-up  -     Ambulatory referral/consult to Gynecologic Oncology; Future    Complicated grief  Will refer to Psychiatry for grief counseling.  -     Ambulatory referral/consult to Psychiatry; Future

## 2023-09-18 NOTE — PROGRESS NOTES
Subjective     Patient ID: Edita Riley is a 58 y.o. female.    Chief Complaint: Establish Care and Hospital Follow Up (For UTI)    HPI  Review of Systems       Objective     Physical Exam       Assessment and Plan     1. Pyelonephritis    2. ODESSA (acute kidney injury)    3. PTSD (post-traumatic stress disorder)  -     Ambulatory referral/consult to Behavioral Health; Future; Expected date: 09/25/2023    4. History of cervical cancer  -     Ambulatory referral/consult to Gynecologic Oncology; Future; Expected date: 09/25/2023                 No follow-ups on file.

## 2023-09-21 ENCOUNTER — OFFICE VISIT (OUTPATIENT)
Dept: UROLOGY | Facility: CLINIC | Age: 58
End: 2023-09-21
Payer: MEDICAID

## 2023-09-21 VITALS
HEART RATE: 75 BPM | HEIGHT: 69 IN | DIASTOLIC BLOOD PRESSURE: 66 MMHG | WEIGHT: 180.31 LBS | BODY MASS INDEX: 26.71 KG/M2 | SYSTOLIC BLOOD PRESSURE: 94 MMHG

## 2023-09-21 DIAGNOSIS — Z93.2 ILEOSTOMY IN PLACE: Primary | Chronic | ICD-10-CM

## 2023-09-21 PROCEDURE — 99215 PR OFFICE/OUTPT VISIT, EST, LEVL V, 40-54 MIN: ICD-10-PCS | Mod: S$PBB,,, | Performed by: UROLOGY

## 2023-09-21 PROCEDURE — 3074F PR MOST RECENT SYSTOLIC BLOOD PRESSURE < 130 MM HG: ICD-10-PCS | Mod: CPTII,,, | Performed by: UROLOGY

## 2023-09-21 PROCEDURE — 3074F SYST BP LT 130 MM HG: CPT | Mod: CPTII,,, | Performed by: UROLOGY

## 2023-09-21 PROCEDURE — 1159F MED LIST DOCD IN RCRD: CPT | Mod: CPTII,,, | Performed by: UROLOGY

## 2023-09-21 PROCEDURE — 3078F PR MOST RECENT DIASTOLIC BLOOD PRESSURE < 80 MM HG: ICD-10-PCS | Mod: CPTII,,, | Performed by: UROLOGY

## 2023-09-21 PROCEDURE — 1159F PR MEDICATION LIST DOCUMENTED IN MEDICAL RECORD: ICD-10-PCS | Mod: CPTII,,, | Performed by: UROLOGY

## 2023-09-21 PROCEDURE — 3008F BODY MASS INDEX DOCD: CPT | Mod: CPTII,,, | Performed by: UROLOGY

## 2023-09-21 PROCEDURE — 99999 PR PBB SHADOW E&M-EST. PATIENT-LVL III: ICD-10-PCS | Mod: PBBFAC,,, | Performed by: UROLOGY

## 2023-09-21 PROCEDURE — 3008F PR BODY MASS INDEX (BMI) DOCUMENTED: ICD-10-PCS | Mod: CPTII,,, | Performed by: UROLOGY

## 2023-09-21 PROCEDURE — 99999 PR PBB SHADOW E&M-EST. PATIENT-LVL III: CPT | Mod: PBBFAC,,, | Performed by: UROLOGY

## 2023-09-21 PROCEDURE — 1111F DSCHRG MED/CURRENT MED MERGE: CPT | Mod: CPTII,,, | Performed by: UROLOGY

## 2023-09-21 PROCEDURE — 99213 OFFICE O/P EST LOW 20 MIN: CPT | Mod: PBBFAC | Performed by: UROLOGY

## 2023-09-21 PROCEDURE — 1111F PR DISCHARGE MEDS RECONCILED W/ CURRENT OUTPATIENT MED LIST: ICD-10-PCS | Mod: CPTII,,, | Performed by: UROLOGY

## 2023-09-21 PROCEDURE — 3078F DIAST BP <80 MM HG: CPT | Mod: CPTII,,, | Performed by: UROLOGY

## 2023-09-21 PROCEDURE — 99215 OFFICE O/P EST HI 40 MIN: CPT | Mod: S$PBB,,, | Performed by: UROLOGY

## 2023-09-21 NOTE — PROGRESS NOTES
CHIEF COMPLAINT:    Mrs. Riley is a 58 y.o. female presenting for a follow up on ureteral enteral anastamotic stricture in a patient who is status post colon conduit for bilateral ureteral strictures.     HPI:   Edita Riley is a 58 y.o. female who is presents with a history of cervical cancer status post radiation therapy.  She developed distal ureteral strictures and was managed initially with bilateral ureteral stents.  She then underwent a transverse colon conduit.  This was complicated by large bowel perforation.  She had an ileostomy and also developed left distal ureteral stricture.  This is being managed with a left percutanous nephrostomy.  She has dilation of the right upper urinary tract but a loopogram was performed in June and she was found to reflux on that side.  She does not reflux on the left side.      She has had two hospitalizations since August for left pyelonephritis.  Her nephrostomy tube was exchanged on 9/11/2023 while she was in house.     REVIEW OF SYSTEMS:    Review of Systems   Constitutional: Negative.    HENT: Negative.     Eyes: Negative.    Respiratory: Negative.     Cardiovascular: Negative.    Gastrointestinal: Negative.    Genitourinary:  Positive for flank pain.   Skin: Negative.    Neurological: Negative.    Endo/Heme/Allergies: Negative.    Psychiatric/Behavioral: Negative.         PATIENT HISTORY:    Past Medical History:   Diagnosis Date    Abnormal mammogram 08/25/2020    Acute blood loss anemia     Acute deep vein thrombosis (DVT) of lower extremity 12/09/2020    Advance care planning 04/30/2021    Anemia due to chronic blood loss     Anemia due to chronic kidney disease     Anxiety     Bilateral ureteral obstruction 9/11/2020    Cardiovascular event risk -- low 09/14/2015    ASCVD 10-year risk 1.9% (with optimal risk factors 1.3%) as of 9/14/2015     Cervical cancer 2014    Chronic back pain     Colostomy care     Deep vein thrombosis     Depression      Diarrhea due to malabsorption 11/14/2018    Difficult intubation     Disorder of kidney and ureter     DVT of lower extremity, bilateral 11/04/2020    Emphysema of lung 4/10/2023    Fibromyalgia     Fungemia 09/27/2020    Generalized abdominal pain 08/25/2020    GERD (gastroesophageal reflux disease)     Hemifacial spasm 09/16/2015    Hiatal hernia 2014    History of cervical cancer 10/11/2018    Hx of psychiatric care     Cymbalta, trazodone    Hypertension     Hypomagnesemia 11/21/2018    Lactose intolerance     Metastatic squamous cell carcinoma to lymph node 10/11/2018    Neuropathy due to chemotherapeutic drug 11/14/2018    Osteoarthritis of back     Peritonitis 09/22/2020    Pseudomonas urinary tract infection 04/21/2021    Psychiatric problem     Refusal of blood transfusions as patient is Latter-day     Schatzki's ring 09/14/2015    Seen on outside EGD 05/2014, underwent esophageal dilatation. Bx were negative.     Seizures     Sleep stage dysfunction     Noted on PSG 06/2017; negative for obstructive sleep apnea     Stroke     Urinary tract infection associated with nephrostomy catheter 06/11/2020    Wound infection after surgery 09/24/2020       Past Surgical History:   Procedure Laterality Date    ANTEGRADE NEPHROSTOGRAPHY Bilateral 9/28/2020    Procedure: Nephrostogram - antegrade;  Surgeon: Leslie Balbuena MD;  Location: [facility] OR [address] [place] [address];  Service: Urology;  Laterality: Bilateral;    ANTEGRADE NEPHROSTOGRAPHY Left 4/20/2021    Procedure: NEPHROSTOGRAM, ANTEGRADE;  Surgeon: Leslie Balbuena MD;  Location: [address];  Service: Urology;  Laterality: Left;    ANTEGRADE NEPHROSTOGRAPHY Right 10/27/2022    Procedure: NEPHROSTOGRAM, ANTEGRADE;  Surgeon: Chirag Russ MD;  Location: [facility] OR [address] [place] [address];  Service: Urology;  Laterality: Right;    ANTEGRADE NEPHROSTOGRAPHY Right 4/21/2023    Procedure: NEPHROSTOGRAM, ANTEGRADE;  Surgeon: Chirag Russ MD;  Location: [facility] OR [address] [place] [address];  Service:  Urology;  Laterality: Right;    ANTEGRADE NEPHROSTOGRAPHY Left 6/6/2023    Procedure: Nephrostogram - antegrade;  Surgeon: Leslie Balbuena MD;  [facility];  Service: Urology;  Laterality: Left;    BILATERAL OOPHORECTOMY  2015    CHOLECYSTECTOMY  11/09/2016    Done at [facility], path showed chronic cholecystitis and gallstones    cold knife conization  2014    COLECTOMY, RIGHT  9/17/2020    Procedure: COLECTOMY, RIGHT Extended;  Surgeon: Hammad Reynolds MD;  Location: [facility] OR 2ND FLR;  Service: Colon and Rectal;;    COLONOSCOPY  2014    COLONOSCOPY N/A 10/24/2016    at [facility], Dr Gutiérrez    COLONOSCOPY N/A 5/18/2018    Procedure: COLONOSCOPY;  Surgeon: Arden Gutiérrez MD;  Location: [facility] (4TH FLR);  Service: Endoscopy;  Laterality: N/A;    COLONOSCOPY N/A 7/28/2020    Procedure: COLONOSCOPY;  Surgeon: Hammad Reynolds MD;  Location: [facility] (4TH FLR);  Service: Colon and Rectal;  Laterality: N/A;  covid test [place] 7/25    CYSTOSCOPY WITH URETEROSCOPY, RETROGRADE PYELOGRAPHY, AND INSERTION OF STENT Bilateral 3/21/2020    Procedure: CYSTOSCOPY, WITH RETROGRADE PYELOGRAM,;  Surgeon: Leslie Balbuena MD;  [facility];  Service: Urology;  Laterality: Bilateral;    DILATION OF NEPHROSTOMY TRACT Right 10/27/2022    Procedure: DILATION, NEPHROSTOMY TRACT;  Surgeon: Chirag Russ MD;  [facility];  Service: Urology;  Laterality: Right;    ESOPHAGOGASTRODUODENOSCOPY  2014    ESOPHAGOGASTRODUODENOSCOPY  11/18/2020    ESOPHAGOGASTRODUODENOSCOPY N/A 11/18/2020    Procedure: ESOPHAGOGASTRODUODENOSCOPY (EGD);  Surgeon: Zenon Spencer MD;  Location: [facility];  Service: Endoscopy;  Laterality: N/A;    ESOPHAGOGASTRODUODENOSCOPY N/A 12/11/2020    Procedure: EGD (ESOPHAGOGASTRODUODENOSCOPY);  Surgeon: Juancho Muse MD;  Location: [facility] (2ND FLR);  Service: Endoscopy;  Laterality: N/A;    HYSTERECTOMY  2014    [facility] for cervical cancer    ILEOSTOMY  9/17/2020     Procedure: CREATION, ILEOSTOMY;  Surgeon: Hammad Reynolds MD;  Location: [facility] OR 2ND FLR;  Service: Colon and Rectal;;    LOOPOGRAM N/A 4/20/2021    Procedure: LOOPOGRAM;  Surgeon: Leslie Balbuena MD;  Location: [facility] OR 1ST FLR;  Service: Urology;  Laterality: N/A;    LOOPOGRAM N/A 6/6/2023    Procedure: LOOPOGRAM;  Surgeon: Leslie Balbuena MD;  Location: [facility] OR 1ST FLR;  Service: Urology;  Laterality: N/A;    MOBILIZATION OF SPLENIC FLEXURE N/A 9/11/2020    Procedure: MOBILIZATION, COLONIC;  Surgeon: Hammad Reynolds MD;  Location: [facility] OR 2ND FLR;  Service: Colon and Rectal;  Laterality: N/A;    NEPHROSTOGRAPHY Bilateral 4/17/2021    Procedure: Nephrostogram;  Surgeon: Celeste Surgeon;  Location: [facility];  Service: Anesthesiology;  Laterality: Bilateral;    OOPHORECTOMY      PERCUTANEOUS NEPHROLITHOTOMY Right 4/21/2023    Procedure: NEPHROLITHOTOMY, PERCUTANEOUS;  Surgeon: Chirag Russ MD;  Location: [facility] OR 2ND FLR;  Service: Urology;  Laterality: Right;  2.5 hrs    PERCUTANEOUS NEPHROSTOMY  4/21/2023    Procedure: CREATION, NEPHROSTOMY, PERCUTANEOUS with removal of existing nephrostomy tube;  Surgeon: Chirag Russ MD;  Location: [facility] OR 2ND FLR;  Service: Urology;;    PYELOSCOPY Right 10/27/2022    Procedure: PYELOSCOPY;  Surgeon: Chirag Russ MD;  Location: [facility] OR 1ST FLR;  Service: Urology;  Laterality: Right;    REPLACEMENT OF NEPHROSTOMY TUBE Right 10/27/2022    Procedure: REPLACEMENT, NEPHROSTOMY TUBE;  Surgeon: Chirag Russ MD;  Location: [facility] OR 1ST FLR;  Service: Urology;  Laterality: Right;    RETROPERITONEAL LYMPHADENECTOMY Right 9/17/2018    Procedure: LYMPHADENECTOMY, RETROPERITONEUM;  Surgeon: Sebas Reed MD;  Location: [facility] OR 2ND FLR;  Service: General;  Laterality: Right;  ILIAC    URETEROSCOPIC REMOVAL OF URETERIC CALCULUS Right 10/27/2022    Procedure: REMOVAL, CALCULUS, URETER, URETEROSCOPIC;  Surgeon: Chirag Russ MD;  [facility];   Service: Urology;  Laterality: Right;    URETEROSCOPY Right 10/27/2022    Procedure: URETEROSCOPY-ANTEGRADE;  Surgeon: Chirag Russ MD;  Location: [facility] OR [address] [place] [address];  Service: Urology;  Laterality: Right;       Family History   Problem Relation Age of Onset    Heart disease Sister     Heart disease Maternal Grandmother     Colon cancer Father     Esophageal cancer Father     Cancer Father         Lung-smoker    Cancer Mother         Cervical    Cervical cancer Mother     Breast cancer Maternal Aunt     Suicide Daughter         jumped from parking structure    Drug abuse Daughter     Drug abuse Daughter      Social History     Socioeconomic History    Marital status:      Spouse name: Hammad    Number of children: 2   Tobacco Use    Smoking status: Never    Smokeless tobacco: Never   Substance and Sexual Activity    Alcohol use: No     Alcohol/week: 0.0 standard drinks of alcohol    Drug use: No    Sexual activity: Yes     Partners: Male     Birth control/protection: None     Comment:  19 years since    Social History Narrative    , twin daughters (1  2018), disabled due to childhood stroke, Jehovah's witness sophia     Social Determinants of Health     Financial Resource Strain: Medium Risk (2023)    Overall Financial Resource Strain (CARDIA)     Difficulty of Paying Living Expenses: Somewhat hard   Food Insecurity: No Food Insecurity (2023)    Hunger Vital Sign     Worried About Running Out of Food in the Last Year: Never true     Ran Out of Food in the Last Year: Never true   Transportation Needs: No Transportation Needs (2023)    PRAPARE - Transportation     Lack of Transportation (Medical): No     Lack of Transportation (Non-Medical): No   Physical Activity: Insufficiently Active (2023)    Exercise Vital Sign     Days of Exercise per Week: 1 day     Minutes of Exercise per Session: 30 min   Stress: No Stress Concern Present (2023)    [place]  [place] [employer] - Occupational Stress Questionnaire     Feeling of Stress : Only a little   Recent Concern: Stress - Stress Concern Present (8/19/2023)    [place] [employer] - Occupational Stress Questionnaire     Feeling of Stress : To some extent   Social Connections: Moderately Isolated (9/11/2023)    Social Connection and Isolation Panel [NHANES]     Frequency of Communication with Friends and Family: Three times a week     Frequency of Social Gatherings with Friends and Family: Three times a week     Attends Episcopal Services: Never     Active Member of Clubs or Organizations: No     Attends Club or Organization Meetings: Never     Marital Status:    Housing Stability: Low Risk  (9/11/2023)    Housing Stability Vital Sign     Unable to Pay for Housing in the Last Year: No     Number of Places Lived in the Last Year: 1     Unstable Housing in the Last Year: No       Allergies:  Bee sting [allergen ext-venom-honey bee] and Grass pollen-bermuda, standard    Medications:  Outpatient Encounter Medications as of 9/21/2023   Medication Sig Dispense Refill    gabapentin (NEURONTIN) 100 MG capsule Take 2 capsules (200 mg total) by mouth every evening. 90 capsule 3    loratadine (CLARITIN) 10 mg tablet Take 10 mg by mouth once daily.      mirtazapine (REMERON) 30 MG tablet Take 1 tablet (30 mg total) by mouth nightly. 30 tablet 11    oxyCODONE (ROXICODONE) 5 MG immediate release tablet Take 1 tablet (5 mg total) by mouth every 12 (twelve) hours as needed for Pain. 20 tablet 0    tolterodine (DETROL LA) 2 MG Cp24 Take 2 mg by mouth once daily.         PHYSICAL EXAMINATION:    The patient generally appears in good health, is appropriately interactive, and is in no apparent distress.    Skin: No lesions.    Mental: Cooperative with normal affect.    Neuro: Grossly intact.    HEENT: Normal. No evidence of lymphadenopathy.    Chest:  normal inspiratory effort.    Abdomen: Soft,  non-tender. No masses or organomegaly. Bladder is not palpable. No evidence of flank discomfort. No evidence of inguinal hernia.    Extremities: No clubbing, cyanosis, or edema    Has an ileostomy and a left nephrostomy tube and colon conduit bag. The urine is clear in both bags.     LABS:    Lab Results   Component Value Date    BUN 14 09/14/2023    CREATININE 1.0 09/14/2023       IMPRESSION:    Left ureteral enteral stricture  Right reflux    PLAN:    1. For exploratory laparotomy and redo left ureteral enteral anastomosis  2.  Will see if Dr. Reynolds will reverse the ileostomy  3.  Consent signed.  She is a Restorationism but is willing to have albumin and erythropoeitin.     I spent 40 minutes with the patient of which more than half was spent in direct consultation with the patient in regards to our treatment and plan.

## 2023-09-22 ENCOUNTER — TELEPHONE (OUTPATIENT)
Dept: GYNECOLOGIC ONCOLOGY | Facility: CLINIC | Age: 58
End: 2023-09-22
Payer: MEDICAID

## 2023-09-22 ENCOUNTER — TELEPHONE (OUTPATIENT)
Dept: UROLOGY | Facility: CLINIC | Age: 58
End: 2023-09-22
Payer: MEDICAID

## 2023-09-22 DIAGNOSIS — K91.30 SMALL BOWEL ANASTOMOTIC STRICTURE: Primary | ICD-10-CM

## 2023-09-22 DIAGNOSIS — K91.89 SMALL BOWEL ANASTOMOTIC STRICTURE: Primary | ICD-10-CM

## 2023-09-22 NOTE — TELEPHONE ENCOUNTER
Call to schedule with Dr. Anglin no answer left voicemail to schedule appointment with our office        SCHEDULING  She is previous [facility] pt, last seen in 2020. Please sched with Derrek, referral is in.    Rylee  ----- Message -----  From: Coniglio, Corinne E, RN  Sent: 9/19/2023  10:16 AM CDT  To: Rylee Dorado RN  Subject: FW: SCHEDULING                                     ----- Message -----  From: Cameron Jacobs  Sent: 9/19/2023  10:12 AM CDT  To: [facility] Cancer Navigation  Subject: SCHEDULING                                       Good morning,       An order has been placed for the patient can your assist with scheduling please? Thanks.       History of cervical cancer [Z85.41]

## 2023-09-27 ENCOUNTER — TELEPHONE (OUTPATIENT)
Dept: GYNECOLOGIC ONCOLOGY | Facility: CLINIC | Age: 58
End: 2023-09-27
Payer: MEDICAID

## 2023-09-27 NOTE — TELEPHONE ENCOUNTER
Spoke with the patient she will call back to schedule ----- Message from Rylee Dorado RN sent at 9/21/2023  1:43 PM CDT -----  Regarding: FW: SCHEDULING  She is previous [facility] pt, last seen in 2020. Please sched with Derrek, referral is in.    Rylee  ----- Message -----  From: Coniglio, Corinne E, RN  Sent: 9/19/2023  10:16 AM CDT  To: Rylee Dorado RN  Subject: FW: SCHEDULING                                     ----- Message -----  From: Cameron Jacobs  Sent: 9/19/2023  10:12 AM CDT  To: [facility] Cancer Navigation  Subject: SCHEDULING                                       Good morning,       An order has been placed for the patient can your assist with scheduling please? Thanks.       History of cervical cancer [Z85.41]

## 2023-09-28 NOTE — PROCEDURES
Interventional Radiology postop note    Pre Op Diagnosis: Obstructive uropathy    Post Op Diagnosis: Same    Procedure: Left nephrostomy tube exchange    Procedure performed by: Abdirashid    Written Informed Consent Obtained: Yes  Specimen Removed: No  Estimated Blood Loss: Minimal    Findings:   Left antegrade nephrogram demonstrated continue ureteral obstruction. Exchange of a percutaneous 12 Fr nephrostomy tube without complication.     Patient tolerated procedure well.    Brandon Soto,   Interventional Radiology    
Maggie Ma RN

## 2023-10-06 ENCOUNTER — TELEPHONE (OUTPATIENT)
Dept: GYNECOLOGIC ONCOLOGY | Facility: CLINIC | Age: 58
End: 2023-10-06
Payer: MEDICAID

## 2023-10-09 ENCOUNTER — TELEPHONE (OUTPATIENT)
Dept: SURGERY | Facility: CLINIC | Age: 58
End: 2023-10-09
Payer: MEDICAID

## 2023-10-09 NOTE — TELEPHONE ENCOUNTER
Left message to return call regarding appt.  Scheduled with Dr Reynolds, at the [facility] location,  will mail appt slip.

## 2023-10-21 ENCOUNTER — HOSPITAL ENCOUNTER (EMERGENCY)
Facility: HOSPITAL | Age: 58
Discharge: HOME | End: 2023-10-22
Attending: EMERGENCY MEDICINE
Payer: MEDICAID

## 2023-10-21 DIAGNOSIS — R10.33 PERIUMBILICAL PAIN: Primary | ICD-10-CM

## 2023-10-21 LAB
ALBUMIN SERPL BCP-MCNC: 3.2 G/DL (ref 3.5–5.2)
ALP SERPL-CCNC: 128 U/L (ref 55–135)
ALT SERPL W/O P-5'-P-CCNC: 12 U/L (ref 10–44)
ANION GAP SERPL CALC-SCNC: 8 MMOL/L (ref 8–16)
AST SERPL-CCNC: 14 U/L (ref 10–40)
BACTERIA #/AREA URNS AUTO: ABNORMAL /HPF
BASOPHILS # BLD AUTO: 0.04 K/UL (ref 0–0.2)
BASOPHILS NFR BLD: 0.4 % (ref 0–1.9)
BILIRUB SERPL-MCNC: 0.2 MG/DL (ref 0.1–1)
BILIRUB UR QL STRIP: NEGATIVE
BUN SERPL-MCNC: 17 MG/DL (ref 6–20)
CALCIUM SERPL-MCNC: 9.4 MG/DL (ref 8.7–10.5)
CHLORIDE SERPL-SCNC: 110 MMOL/L (ref 95–110)
CLARITY UR REFRACT.AUTO: ABNORMAL
CO2 SERPL-SCNC: 21 MMOL/L (ref 23–29)
COLOR UR AUTO: YELLOW
CREAT SERPL-MCNC: 1.3 MG/DL (ref 0.5–1.4)
DIFFERENTIAL METHOD: ABNORMAL
EOSINOPHIL # BLD AUTO: 0.2 K/UL (ref 0–0.5)
EOSINOPHIL NFR BLD: 2.2 % (ref 0–8)
ERYTHROCYTE [DISTWIDTH] IN BLOOD BY AUTOMATED COUNT: 15.9 % (ref 11.5–14.5)
EST. GFR  (NO RACE VARIABLE): 47.1 ML/MIN/1.73 M^2
GLUCOSE SERPL-MCNC: 100 MG/DL (ref 70–110)
GLUCOSE UR QL STRIP: NEGATIVE
HCT VFR BLD AUTO: 38.4 % (ref 37–48.5)
HGB BLD-MCNC: 11.7 G/DL (ref 12–16)
HGB UR QL STRIP: ABNORMAL
HYALINE CASTS UR QL AUTO: 3 /LPF
IMM GRANULOCYTES # BLD AUTO: 0.02 K/UL (ref 0–0.04)
IMM GRANULOCYTES NFR BLD AUTO: 0.2 % (ref 0–0.5)
KETONES UR QL STRIP: NEGATIVE
LACTATE SERPL-SCNC: 1.3 MMOL/L (ref 0.5–2.2)
LEUKOCYTE ESTERASE UR QL STRIP: ABNORMAL
LIPASE SERPL-CCNC: 27 U/L (ref 4–60)
LYMPHOCYTES # BLD AUTO: 1.2 K/UL (ref 1–4.8)
LYMPHOCYTES NFR BLD: 13.1 % (ref 18–48)
MCH RBC QN AUTO: 27.6 PG (ref 27–31)
MCHC RBC AUTO-ENTMCNC: 30.5 G/DL (ref 32–36)
MCV RBC AUTO: 91 FL (ref 82–98)
MICROSCOPIC COMMENT: ABNORMAL
MONOCYTES # BLD AUTO: 0.9 K/UL (ref 0.3–1)
MONOCYTES NFR BLD: 10.1 % (ref 4–15)
NEUTROPHILS # BLD AUTO: 6.9 K/UL (ref 1.8–7.7)
NEUTROPHILS NFR BLD: 74 % (ref 38–73)
NITRITE UR QL STRIP: NEGATIVE
NRBC BLD-RTO: 0 /100 WBC
PH UR STRIP: 6 [PH] (ref 5–8)
PLATELET # BLD AUTO: 259 K/UL (ref 150–450)
PMV BLD AUTO: 9.8 FL (ref 9.2–12.9)
POTASSIUM SERPL-SCNC: 4 MMOL/L (ref 3.5–5.1)
PROT SERPL-MCNC: 7.4 G/DL (ref 6–8.4)
PROT UR QL STRIP: ABNORMAL
RBC # BLD AUTO: 4.24 M/UL (ref 4–5.4)
RBC #/AREA URNS AUTO: >100 /HPF (ref 0–4)
SODIUM SERPL-SCNC: 139 MMOL/L (ref 136–145)
SP GR UR STRIP: 1.02 (ref 1–1.03)
URN SPEC COLLECT METH UR: ABNORMAL
WBC # BLD AUTO: 9.3 K/UL (ref 3.9–12.7)
WBC #/AREA URNS AUTO: >100 /HPF (ref 0–5)
WBC CLUMPS UR QL AUTO: ABNORMAL
YEAST UR QL AUTO: ABNORMAL

## 2023-10-21 PROCEDURE — 63600175 PHARM REV CODE 636 W HCPCS: Performed by: EMERGENCY MEDICINE

## 2023-10-21 PROCEDURE — 80053 COMPREHEN METABOLIC PANEL: CPT | Performed by: EMERGENCY MEDICINE

## 2023-10-21 PROCEDURE — 85025 COMPLETE CBC W/AUTO DIFF WBC: CPT | Performed by: EMERGENCY MEDICINE

## 2023-10-21 PROCEDURE — 87086 URINE CULTURE/COLONY COUNT: CPT | Performed by: EMERGENCY MEDICINE

## 2023-10-21 PROCEDURE — 96361 HYDRATE IV INFUSION ADD-ON: CPT

## 2023-10-21 PROCEDURE — 96375 TX/PRO/DX INJ NEW DRUG ADDON: CPT

## 2023-10-21 PROCEDURE — 83605 ASSAY OF LACTIC ACID: CPT | Performed by: EMERGENCY MEDICINE

## 2023-10-21 PROCEDURE — 25500020 PHARM REV CODE 255: Performed by: EMERGENCY MEDICINE

## 2023-10-21 PROCEDURE — 87077 CULTURE AEROBIC IDENTIFY: CPT | Performed by: EMERGENCY MEDICINE

## 2023-10-21 PROCEDURE — 99285 EMERGENCY DEPT VISIT HI MDM: CPT | Mod: 25

## 2023-10-21 PROCEDURE — 87186 SC STD MICRODIL/AGAR DIL: CPT | Performed by: EMERGENCY MEDICINE

## 2023-10-21 PROCEDURE — 83690 ASSAY OF LIPASE: CPT | Performed by: EMERGENCY MEDICINE

## 2023-10-21 PROCEDURE — 96374 THER/PROPH/DIAG INJ IV PUSH: CPT

## 2023-10-21 PROCEDURE — 87088 URINE BACTERIA CULTURE: CPT | Performed by: EMERGENCY MEDICINE

## 2023-10-21 PROCEDURE — 81001 URINALYSIS AUTO W/SCOPE: CPT | Performed by: EMERGENCY MEDICINE

## 2023-10-21 RX ORDER — ONDANSETRON 2 MG/ML
4 INJECTION INTRAMUSCULAR; INTRAVENOUS
Status: COMPLETED | OUTPATIENT
Start: 2023-10-21 | End: 2023-10-21

## 2023-10-21 RX ORDER — MORPHINE SULFATE 2 MG/ML
6 INJECTION, SOLUTION INTRAMUSCULAR; INTRAVENOUS
Status: COMPLETED | OUTPATIENT
Start: 2023-10-21 | End: 2023-10-21

## 2023-10-21 RX ORDER — HYDROMORPHONE HYDROCHLORIDE 1 MG/ML
1 INJECTION, SOLUTION INTRAMUSCULAR; INTRAVENOUS; SUBCUTANEOUS
Status: COMPLETED | OUTPATIENT
Start: 2023-10-21 | End: 2023-10-21

## 2023-10-21 RX ADMIN — SODIUM CHLORIDE, POTASSIUM CHLORIDE, SODIUM LACTATE AND CALCIUM CHLORIDE 1000 ML: 600; 310; 30; 20 INJECTION, SOLUTION INTRAVENOUS at 10:10

## 2023-10-21 RX ADMIN — IOHEXOL 75 ML: 350 INJECTION, SOLUTION INTRAVENOUS at 08:10

## 2023-10-21 RX ADMIN — HYDROMORPHONE HYDROCHLORIDE 1 MG: 1 INJECTION, SOLUTION INTRAMUSCULAR; INTRAVENOUS; SUBCUTANEOUS at 08:10

## 2023-10-21 RX ADMIN — ONDANSETRON 4 MG: 2 INJECTION INTRAMUSCULAR; INTRAVENOUS at 05:10

## 2023-10-21 RX ADMIN — MORPHINE SULFATE 6 MG: 2 INJECTION, SOLUTION INTRAMUSCULAR; INTRAVENOUS at 05:10

## 2023-10-21 NOTE — ED PROVIDER NOTES
Encounter Date: 10/21/2023       History     Chief Complaint   Patient presents with    Post-op Problem     Drains are leaking     This history was obtained from the patient without limitations.      She is a 58-year-old with a blow past medical history who presents by personal transportation.  She complains of severe periumbilical abdominal pain that began this morning.  The pain has been constant since onset.  She has diminished output from her colostomy.  She denies fever, chills, nausea, and vomiting.  She denies headache and dizziness.  She denies chest pain and shortness of breath.  She has not taken OTC or prescription pain medications.  Her pain is worsened by movement.        Review of patient's allergies indicates:   Allergen Reactions    Bee sting [allergen ext-venom-honey bee]      Rash      Grass pollen-bermuda, standard      rash     Past Medical History:   Diagnosis Date    Abnormal mammogram 08/25/2020    Acute blood loss anemia     Acute deep vein thrombosis (DVT) of lower extremity 12/09/2020    Advance care planning 04/30/2021    Anemia due to chronic blood loss     Anemia due to chronic kidney disease     Anxiety     Bilateral ureteral obstruction 9/11/2020    Cardiovascular event risk -- low 09/14/2015    ASCVD 10-year risk 1.9% (with optimal risk factors 1.3%) as of 9/14/2015     Cervical cancer 2014    Chronic back pain     Colostomy care     Deep vein thrombosis     Depression     Diarrhea due to malabsorption 11/14/2018    Difficult intubation     Disorder of kidney and ureter     DVT of lower extremity, bilateral 11/04/2020    Emphysema of lung 4/10/2023    Fibromyalgia     Fungemia 09/27/2020    Generalized abdominal pain 08/25/2020    GERD (gastroesophageal reflux disease)     Hemifacial spasm 09/16/2015    Hiatal hernia 2014    History of cervical cancer 10/11/2018    Hx of psychiatric care     Cymbalta, trazodone    Hypertension     Hypomagnesemia 11/21/2018    Lactose intolerance      Metastatic squamous cell carcinoma to lymph node 10/11/2018    Neuropathy due to chemotherapeutic drug 11/14/2018    Osteoarthritis of back     Peritonitis 09/22/2020    Pseudomonas urinary tract infection 04/21/2021    Psychiatric problem     Refusal of blood transfusions as patient is Rastafarian     Schatzki's ring 09/14/2015    Seen on outside EGD 05/2014, underwent esophageal dilatation. Bx were negative.     Seizures     Sleep stage dysfunction     Noted on PSG 06/2017; negative for obstructive sleep apnea     Stroke     Urinary tract infection associated with nephrostomy catheter 06/11/2020    Wound infection after surgery 09/24/2020     Past Surgical History:   Procedure Laterality Date    ANTEGRADE NEPHROSTOGRAPHY Bilateral 9/28/2020    Procedure: Nephrostogram - antegrade;  Surgeon: Leslie Balbuena MD;  Location: [facility] OR [address] [place] [address];  Service: Urology;  Laterality: Bilateral;    ANTEGRADE NEPHROSTOGRAPHY Left 4/20/2021    Procedure: NEPHROSTOGRAM, ANTEGRADE;  Surgeon: Leslie Balbuena MD;  [facility];  Service: Urology;  Laterality: Left;    ANTEGRADE NEPHROSTOGRAPHY Right 10/27/2022    Procedure: NEPHROSTOGRAM, ANTEGRADE;  Surgeon: Chirag Russ MD;  [facility];  Service: Urology;  Laterality: Right;    ANTEGRADE NEPHROSTOGRAPHY Right 4/21/2023    Procedure: NEPHROSTOGRAM, ANTEGRADE;  Surgeon: Chirag Russ MD;  Location: [facility] OR 2ND FLR;  Service: Urology;  Laterality: Right;    ANTEGRADE NEPHROSTOGRAPHY Left 6/6/2023    Procedure: Nephrostogram - antegrade;  Surgeon: Leslie Balbuena MD;  [facility];  Service: Urology;  Laterality: Left;    BILATERAL OOPHORECTOMY  2015    CHOLECYSTECTOMY  11/09/2016    Done at [facility], path showed chronic cholecystitis and gallstones    cold knife conization  2014    COLECTOMY, RIGHT  9/17/2020    Procedure: COLECTOMY, RIGHT Extended;  Surgeon: Hammad Reynolds MD;  Location: [facility] OR 2ND FLR;  Service: Colon and  Rectal;;    COLONOSCOPY  2014    COLONOSCOPY N/A 10/24/2016    at [facility], Dr Gutiérrez    COLONOSCOPY N/A 5/18/2018    Procedure: COLONOSCOPY;  Surgeon: Arden Gutiérrez MD;  Location: [facility] (4TH FLR);  Service: Endoscopy;  Laterality: N/A;    COLONOSCOPY N/A 7/28/2020    Procedure: COLONOSCOPY;  Surgeon: Hammad Reynolds MD;  Location: [facility] (4TH FLR);  Service: Colon and Rectal;  Laterality: N/A;  covid test [place] 7/25    CYSTOSCOPY WITH URETEROSCOPY, RETROGRADE PYELOGRAPHY, AND INSERTION OF STENT Bilateral 3/21/2020    Procedure: CYSTOSCOPY, WITH RETROGRADE PYELOGRAM,;  Surgeon: Leslie Balbuena MD;  Location: [facility] OR 1ST FLR;  Service: Urology;  Laterality: Bilateral;    DILATION OF NEPHROSTOMY TRACT Right 10/27/2022    Procedure: DILATION, NEPHROSTOMY TRACT;  Surgeon: Chirag Russ MD;  Location: [facility] OR 1ST FLR;  Service: Urology;  Laterality: Right;    ESOPHAGOGASTRODUODENOSCOPY  2014    ESOPHAGOGASTRODUODENOSCOPY  11/18/2020    ESOPHAGOGASTRODUODENOSCOPY N/A 11/18/2020    Procedure: ESOPHAGOGASTRODUODENOSCOPY (EGD);  Surgeon: Zenon Spencer MD;  Location: [facility];  Service: Endoscopy;  Laterality: N/A;    ESOPHAGOGASTRODUODENOSCOPY N/A 12/11/2020    Procedure: EGD (ESOPHAGOGASTRODUODENOSCOPY);  Surgeon: Juancho Muse MD;  Location: [facility] (2ND FLR);  Service: Endoscopy;  Laterality: N/A;    HYSTERECTOMY  2014    [facility] for cervical cancer    ILEOSTOMY  9/17/2020    Procedure: CREATION, ILEOSTOMY;  Surgeon: Hammad Reynolds MD;  Location: [facility] OR 2ND FLR;  Service: Colon and Rectal;;    LOOPOGRAM N/A 4/20/2021    Procedure: LOOPOGRAM;  Surgeon: Leslie Balbuena MD;  Location: [facility] OR 1ST FLR;  Service: Urology;  Laterality: N/A;    LOOPOGRAM N/A 6/6/2023    Procedure: LOOPOGRAM;  Surgeon: Leslie Balbuena MD;  Location: [facility] OR 1ST FLR;  Service: Urology;  Laterality: N/A;    MOBILIZATION OF SPLENIC FLEXURE N/A 9/11/2020    Procedure: MOBILIZATION, COLONIC;  Surgeon: Hammad Reynolds,  MD;  Location: [facility] OR 2ND FLR;  Service: Colon and Rectal;  Laterality: N/A;    NEPHROSTOGRAPHY Bilateral 4/17/2021    Procedure: Nephrostogram;  Surgeon: Celeste Surgeon;  [facility];  Service: Anesthesiology;  Laterality: Bilateral;    OOPHORECTOMY      PERCUTANEOUS NEPHROLITHOTOMY Right 4/21/2023    Procedure: NEPHROLITHOTOMY, PERCUTANEOUS;  Surgeon: Chirag Russ MD;  Location: [facility] OR 2ND FLR;  Service: Urology;  Laterality: Right;  2.5 hrs    PERCUTANEOUS NEPHROSTOMY  4/21/2023    Procedure: CREATION, NEPHROSTOMY, PERCUTANEOUS with removal of existing nephrostomy tube;  Surgeon: Chirag Russ MD;  [facility];  Service: Urology;;    PYELOSCOPY Right 10/27/2022    Procedure: PYELOSCOPY;  Surgeon: Chirag Russ MD;  [facility];  Service: Urology;  Laterality: Right;    REPLACEMENT OF NEPHROSTOMY TUBE Right 10/27/2022    Procedure: REPLACEMENT, NEPHROSTOMY TUBE;  Surgeon: Chirag Russ MD;  [facility];  Service: Urology;  Laterality: Right;    RETROPERITONEAL LYMPHADENECTOMY Right 9/17/2018    Procedure: LYMPHADENECTOMY, RETROPERITONEUM;  Surgeon: Sebas Reed MD;  [facility];  Service: General;  Laterality: Right;  ILIAC    URETEROSCOPIC REMOVAL OF URETERIC CALCULUS Right 10/27/2022    Procedure: REMOVAL, CALCULUS, URETER, URETEROSCOPIC;  Surgeon: Chirag Russ MD;  [facility];  Service: Urology;  Laterality: Right;    URETEROSCOPY Right 10/27/2022    Procedure: URETEROSCOPY-ANTEGRADE;  Surgeon: Chirag Russ MD;  [facility];  Service: Urology;  Laterality: Right;     Family History   Problem Relation Age of Onset    Heart disease Sister     Heart disease Maternal Grandmother     Colon cancer Father     Esophageal cancer Father     Cancer Father         Lung-smoker    Cancer Mother         Cervical    Cervical cancer Mother     Breast cancer Maternal Aunt     Suicide Daughter          jumped from parking structure    Drug abuse Daughter     Drug abuse Daughter     Rectal cancer Neg Hx     Stomach cancer Neg Hx     Crohn's disease Neg Hx     Ulcerative colitis Neg Hx     Diabetes Neg Hx     Hypertension Neg Hx      Social History     Tobacco Use    Smoking status: Never    Smokeless tobacco: Never   Substance Use Topics    Alcohol use: No     Alcohol/week: 0.0 standard drinks of alcohol    Drug use: No     Physical Exam     Initial Vitals [10/21/23 1548]   BP Pulse Resp Temp SpO2   117/69 88 18 98.7 °F (37.1 °C) 99 %      MAP       --         Physical Exam    Nursing note and vitals reviewed.  Constitutional: She is not diaphoretic. No distress.   HENT:   Head: Normocephalic and atraumatic.   Mouth/Throat: Oropharynx is clear and moist.   Eyes: Conjunctivae are normal. No scleral icterus.   Cardiovascular:  Normal rate, regular rhythm and normal heart sounds.     Exam reveals no gallop and no friction rub.       No murmur heard.  Pulmonary/Chest: Effort normal and breath sounds normal. No respiratory distress. She has no decreased breath sounds. She has no wheezes. She has no rhonchi. She has no rales.   Abdominal: Abdomen is soft. She exhibits no distension. There is abdominal tenderness in the periumbilical area.   Right-sided colostomy. Mixed watery and formed brown, nonbloody stool in colostomy bag.  Left-sided urostomy. There is no rebound and no guarding.     Neurological: She is alert and oriented to person, place, and time. GCS score is 15. GCS eye subscore is 4. GCS verbal subscore is 5. GCS motor subscore is 6.   Skin: Skin is warm and dry. No pallor.   Psychiatric: She has a normal mood and affect. Her speech is normal and behavior is normal. She is not actively hallucinating. She is attentive.         ED Course   Procedures  Labs Reviewed   CBC W/ AUTO DIFFERENTIAL - Abnormal; Notable for the following components:       Result Value    Hemoglobin 11.7 (*)     MCHC 30.5 (*)     RDW  15.9 (*)     Gran % 74.0 (*)     Lymph % 13.1 (*)     All other components within normal limits   COMPREHENSIVE METABOLIC PANEL - Abnormal; Notable for the following components:    CO2 21 (*)     Albumin 3.2 (*)     eGFR 47.1 (*)     All other components within normal limits   LIPASE   LACTIC ACID, PLASMA   URINALYSIS, REFLEX TO URINE CULTURE          Imaging Results              CT Abdomen Pelvis With Contrast (In process)                      Medications   morphine injection 6 mg (6 mg Intravenous Given 10/21/23 1758)   ondansetron injection 4 mg (4 mg Intravenous Given 10/21/23 1758)   HYDROmorphone injection 1 mg (1 mg Intravenous Given 10/21/23 2018)   iohexoL (OMNIPAQUE 350) injection 75 mL (75 mLs Intravenous Given 10/21/23 2003)     Medical Decision Making  Amount and/or Complexity of Data Reviewed  Labs: ordered.  Radiology: ordered.    Risk  Prescription drug management.               ED Course as of 10/21/23 2104   Sat Oct 21, 2023   1720 Workup and treatment plan:  Evaluation for acute abdominal pain inpatient with colostomy and urostomy.  Periumbilical tenderness on exam.  Normal vital signs.  Obtaining labs (CBC, CMP, UA, lipase, lactic acid).  Administering IV morphine and Zofran.  Obtaining contrasted CT of the abdomen and pelvis.  Differential diagnoses:  Colitis, enteritis, bowel obstruction, pyelonephritis, ureterolithiasis with colic, and others. [LP]   1943 No improvement with morphine. Ordering dilaudid. [LP]      ED Course User Index  [LP] Phillip Wilson III, MD                  Patient signed out to Dr. Lynn at shift change pending CT read and UA result.    Clinical Impression:   Final diagnoses:  [R10.33] Periumbilical pain (Primary)               Phillip Wilson III, MD  10/21/23 2105

## 2023-10-21 NOTE — ED TRIAGE NOTES
Edita Riley, a 58 y.o. female presents to the ED w/ complaint of abdominal pain. Pt states that she is having intermittent abdominal pain with a sudden onset of prior to arrival. Pt also states that her drain are leaking. She has a colostomy and nephrostomy drains that were placed last month are leaking. She states they began leaking today. She is also complaining of back pain where the tubes are located. Pt denies any other complaints at this time such as nausea/ vomiting, diarrhea or shortness of breath.     Triage note:  Chief Complaint   Patient presents with    Post-op Problem     Drains are leaking     Review of patient's allergies indicates:   Allergen Reactions    Bee sting [allergen ext-venom-honey bee]      Rash      Grass pollen-bermuda, standard      rash     Past Medical History:   Diagnosis Date    Abnormal mammogram 08/25/2020    Acute blood loss anemia     Acute deep vein thrombosis (DVT) of lower extremity 12/09/2020    Advance care planning 04/30/2021    Anemia due to chronic blood loss     Anemia due to chronic kidney disease     Anxiety     Bilateral ureteral obstruction 9/11/2020    Cardiovascular event risk -- low 09/14/2015    ASCVD 10-year risk 1.9% (with optimal risk factors 1.3%) as of 9/14/2015     Cervical cancer 2014    Chronic back pain     Colostomy care     Deep vein thrombosis     Depression     Diarrhea due to malabsorption 11/14/2018    Difficult intubation     Disorder of kidney and ureter     DVT of lower extremity, bilateral 11/04/2020    Emphysema of lung 4/10/2023    Fibromyalgia     Fungemia 09/27/2020    Generalized abdominal pain 08/25/2020    GERD (gastroesophageal reflux disease)     Hemifacial spasm 09/16/2015    Hiatal hernia 2014    History of cervical cancer 10/11/2018    Hx of psychiatric care     Cymbalta, trazodone    Hypertension     Hypomagnesemia 11/21/2018    Lactose intolerance     Metastatic squamous cell carcinoma to lymph node 10/11/2018     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 Bx were negative.     Seizures     Sleep stage dysfunction     Noted on PSG 06/2017; negative for obstructive sleep apnea     Stroke     Urinary tract infection associated with nephrostomy catheter 06/11/2020    Wound infection after surgery 09/24/2020

## 2023-10-22 VITALS
DIASTOLIC BLOOD PRESSURE: 59 MMHG | WEIGHT: 165 LBS | BODY MASS INDEX: 24.44 KG/M2 | HEIGHT: 69 IN | HEART RATE: 75 BPM | RESPIRATION RATE: 18 BRPM | OXYGEN SATURATION: 98 % | TEMPERATURE: 98 F | SYSTOLIC BLOOD PRESSURE: 107 MMHG

## 2023-10-22 NOTE — PROVIDER PROGRESS NOTES - EMERGENCY DEPT.
Encounter Date: 10/21/2023    ED Physician Progress Notes        Physician Note:   ED Physician Hand-off Note:    ED Course: I assumed care of patient from off-going ED physician team. Briefly, Patient is a 58 F hx cervical cancer treated by XRT c/b b/l hydronephrosis requiring nephrostomy tube, bowel perforation w/ ileostomy here for periumbilical abdominal pain and decreased output in ileostomy.     At the time of signout plan was pending CT abd/pelvis, UA.    Medications given in the ED:    Medications  morphine injection 6 mg (6 mg Intravenous Given 10/21/23 1758)  ondansetron injection 4 mg (4 mg Intravenous Given 10/21/23 1758)  HYDROmorphone injection 1 mg (1 mg Intravenous Given 10/21/23 2018)  iohexoL (OMNIPAQUE 350) injection 75 mL (75 mLs Intravenous Given 10/21/23 2003)     Impression:     Few upper limit of normal caliber fluid-filled loops of small bowel within the central abdomen without discrete transition point.  This demonstrates a similar configuration to prior study of 08/18/2023, noting overall degree and extent of small-bowel dilatation is not as significant however.  Component of early partial small bowel obstruction would be difficult to entirely exclude given history.  No evidence to suggest high-grade small bowel obstruction at this time.     Postoperative change of prior urinary diversion with colon conduit.  Left lower quadrant colostomy and right lower quadrant ileostomy.  Left-sided nephrostomy tube in place with mild prominence of the left renal collecting system as well as moderate to severe right-sided hydronephrosis, stable from prior examination.     Additional findings as above.        Electronically signed by: Fransisco Richards MD  Date:                                            10/21/2023  Time:                                           22:07    10:15 PM   patient re-evaluated, reports improvement of symptoms.  There is stool in her ostomy.  CT scan does show upper limit of  normal caliber loops of small bowel with in the central of the abdomen, no transition point.  No high-grade obstruction.  Very similar to previous CT scan.  No vomiting in ED.  Recommend follow up with surgeons as outpatient.  Return precautions givne.     Disposition: discharge    Patient comfortable with plan. Patient counseled regarding exam, results, diagnosis, treatment, and plan.    Impression: abdominal pain.    LIZBET Lynn MD  Staff ED Physician

## 2023-10-22 NOTE — DISCHARGE INSTRUCTIONS
Your CT scan does not show an obstruction.  Continue current medications.  Your urine sample was collected from your bag, this is likely a contaminated sample.  We will wait for urine culture to result before treatment  Return to emergency department for any concerning symptom.

## 2023-10-23 LAB — BACTERIA UR CULT: ABNORMAL

## 2023-10-26 ENCOUNTER — HOSPITAL ENCOUNTER (INPATIENT)
Facility: HOSPITAL | Age: 58
LOS: 3 days | Discharge: HOME | DRG: 699 | End: 2023-10-29
Attending: EMERGENCY MEDICINE | Admitting: INTERNAL MEDICINE
Payer: MEDICAID

## 2023-10-26 DIAGNOSIS — N99.528 NEPHROSTOMY COMPLICATION: ICD-10-CM

## 2023-10-26 DIAGNOSIS — Z93.6 NEPHROSTOMY STATUS: Primary | Chronic | ICD-10-CM

## 2023-10-26 DIAGNOSIS — R07.9 CHEST PAIN: ICD-10-CM

## 2023-10-26 PROBLEM — F33.1 MAJOR DEPRESSIVE DISORDER, RECURRENT EPISODE, MODERATE: Chronic | Status: ACTIVE | Noted: 2023-06-02

## 2023-10-26 PROBLEM — D63.1 ANEMIA DUE TO CHRONIC KIDNEY DISEASE: Chronic | Status: ACTIVE | Noted: 2020-05-18

## 2023-10-26 PROBLEM — F41.1 GENERALIZED ANXIETY DISORDER: Chronic | Status: RESOLVED | Noted: 2018-10-17 | Resolved: 2023-10-26

## 2023-10-26 PROBLEM — N18.9 ANEMIA DUE TO CHRONIC KIDNEY DISEASE: Chronic | Status: ACTIVE | Noted: 2020-05-18

## 2023-10-26 PROBLEM — N39.0 UTI (URINARY TRACT INFECTION): Status: ACTIVE | Noted: 2023-10-26

## 2023-10-26 LAB
ALBUMIN SERPL BCP-MCNC: 3.2 G/DL (ref 3.5–5.2)
ALP SERPL-CCNC: 142 U/L (ref 55–135)
ALT SERPL W/O P-5'-P-CCNC: 13 U/L (ref 10–44)
ANION GAP SERPL CALC-SCNC: 11 MMOL/L (ref 8–16)
AST SERPL-CCNC: 15 U/L (ref 10–40)
BASOPHILS # BLD AUTO: 0.05 K/UL (ref 0–0.2)
BASOPHILS NFR BLD: 0.6 % (ref 0–1.9)
BILIRUB SERPL-MCNC: 0.2 MG/DL (ref 0.1–1)
BUN SERPL-MCNC: 19 MG/DL (ref 6–20)
CALCIUM SERPL-MCNC: 9.3 MG/DL (ref 8.7–10.5)
CHLORIDE SERPL-SCNC: 111 MMOL/L (ref 95–110)
CO2 SERPL-SCNC: 19 MMOL/L (ref 23–29)
CREAT SERPL-MCNC: 1.4 MG/DL (ref 0.5–1.4)
DIFFERENTIAL METHOD: ABNORMAL
EOSINOPHIL # BLD AUTO: 0.3 K/UL (ref 0–0.5)
EOSINOPHIL NFR BLD: 3.6 % (ref 0–8)
ERYTHROCYTE [DISTWIDTH] IN BLOOD BY AUTOMATED COUNT: 15.7 % (ref 11.5–14.5)
EST. GFR  (NO RACE VARIABLE): 43.1 ML/MIN/1.73 M^2
GLUCOSE SERPL-MCNC: 98 MG/DL (ref 70–110)
HCT VFR BLD AUTO: 36.8 % (ref 37–48.5)
HGB BLD-MCNC: 11.2 G/DL (ref 12–16)
IMM GRANULOCYTES # BLD AUTO: 0.03 K/UL (ref 0–0.04)
IMM GRANULOCYTES NFR BLD AUTO: 0.4 % (ref 0–0.5)
LYMPHOCYTES # BLD AUTO: 1.5 K/UL (ref 1–4.8)
LYMPHOCYTES NFR BLD: 18.1 % (ref 18–48)
MCH RBC QN AUTO: 28 PG (ref 27–31)
MCHC RBC AUTO-ENTMCNC: 30.4 G/DL (ref 32–36)
MCV RBC AUTO: 92 FL (ref 82–98)
MONOCYTES # BLD AUTO: 1 K/UL (ref 0.3–1)
MONOCYTES NFR BLD: 12.2 % (ref 4–15)
NEUTROPHILS # BLD AUTO: 5.5 K/UL (ref 1.8–7.7)
NEUTROPHILS NFR BLD: 65.1 % (ref 38–73)
NRBC BLD-RTO: 0 /100 WBC
PLATELET # BLD AUTO: 262 K/UL (ref 150–450)
PMV BLD AUTO: 10.3 FL (ref 9.2–12.9)
POTASSIUM SERPL-SCNC: 4 MMOL/L (ref 3.5–5.1)
PROT SERPL-MCNC: 7.5 G/DL (ref 6–8.4)
RBC # BLD AUTO: 4 M/UL (ref 4–5.4)
SODIUM SERPL-SCNC: 141 MMOL/L (ref 136–145)
WBC # BLD AUTO: 8.36 K/UL (ref 3.9–12.7)

## 2023-10-26 PROCEDURE — 63600175 PHARM REV CODE 636 W HCPCS: Performed by: INTERNAL MEDICINE

## 2023-10-26 PROCEDURE — 96372 THER/PROPH/DIAG INJ SC/IM: CPT

## 2023-10-26 PROCEDURE — G0378 HOSPITAL OBSERVATION PER HR: HCPCS

## 2023-10-26 PROCEDURE — 63600175 PHARM REV CODE 636 W HCPCS: Performed by: RADIOLOGY

## 2023-10-26 PROCEDURE — 25000003 PHARM REV CODE 250: Performed by: EMERGENCY MEDICINE

## 2023-10-26 PROCEDURE — 99223 PR INITIAL HOSPITAL CARE,LEVL III: ICD-10-PCS | Mod: ,,, | Performed by: HOSPITALIST

## 2023-10-26 PROCEDURE — 11000001 HC ACUTE MED/SURG PRIVATE ROOM

## 2023-10-26 PROCEDURE — 63600175 PHARM REV CODE 636 W HCPCS

## 2023-10-26 PROCEDURE — 99223 1ST HOSP IP/OBS HIGH 75: CPT | Mod: ,,, | Performed by: HOSPITALIST

## 2023-10-26 PROCEDURE — 25000003 PHARM REV CODE 250

## 2023-10-26 PROCEDURE — 99223 1ST HOSP IP/OBS HIGH 75: CPT | Mod: 25,,,

## 2023-10-26 PROCEDURE — 80053 COMPREHEN METABOLIC PANEL: CPT

## 2023-10-26 PROCEDURE — 25000003 PHARM REV CODE 250: Performed by: INTERNAL MEDICINE

## 2023-10-26 PROCEDURE — 99285 EMERGENCY DEPT VISIT HI MDM: CPT

## 2023-10-26 PROCEDURE — 85025 COMPLETE CBC W/AUTO DIFF WBC: CPT

## 2023-10-26 PROCEDURE — 25500020 PHARM REV CODE 255: Performed by: HOSPITALIST

## 2023-10-26 PROCEDURE — 99223 PR INITIAL HOSPITAL CARE,LEVL III: ICD-10-PCS | Mod: 25,,,

## 2023-10-26 RX ORDER — OXYCODONE HYDROCHLORIDE 5 MG/1
5 TABLET ORAL
Status: COMPLETED | OUTPATIENT
Start: 2023-10-26 | End: 2023-10-26

## 2023-10-26 RX ORDER — SODIUM CHLORIDE 0.9 % (FLUSH) 0.9 %
10 SYRINGE (ML) INJECTION EVERY 12 HOURS PRN
Status: DISCONTINUED | OUTPATIENT
Start: 2023-10-26 | End: 2023-10-29 | Stop reason: HOSPADM

## 2023-10-26 RX ORDER — OXYCODONE HYDROCHLORIDE 5 MG/1
5 TABLET ORAL EVERY 12 HOURS PRN
Status: DISCONTINUED | OUTPATIENT
Start: 2023-10-26 | End: 2023-10-26

## 2023-10-26 RX ORDER — IBUPROFEN 200 MG
16 TABLET ORAL
Status: DISCONTINUED | OUTPATIENT
Start: 2023-10-26 | End: 2023-10-29 | Stop reason: HOSPADM

## 2023-10-26 RX ORDER — MIRTAZAPINE 30 MG/1
30 TABLET, FILM COATED ORAL NIGHTLY
Status: DISCONTINUED | OUTPATIENT
Start: 2023-10-26 | End: 2023-10-29 | Stop reason: HOSPADM

## 2023-10-26 RX ORDER — GLUCAGON 1 MG
1 KIT INJECTION
Status: DISCONTINUED | OUTPATIENT
Start: 2023-10-26 | End: 2023-10-29 | Stop reason: HOSPADM

## 2023-10-26 RX ORDER — FENTANYL CITRATE 50 UG/ML
INJECTION, SOLUTION INTRAMUSCULAR; INTRAVENOUS
Status: COMPLETED | OUTPATIENT
Start: 2023-10-26 | End: 2023-10-26

## 2023-10-26 RX ORDER — MORPHINE SULFATE 4 MG/ML
4 INJECTION, SOLUTION INTRAMUSCULAR; INTRAVENOUS ONCE
Status: COMPLETED | OUTPATIENT
Start: 2023-10-26 | End: 2023-10-26

## 2023-10-26 RX ORDER — OXYCODONE HYDROCHLORIDE 10 MG/1
10 TABLET ORAL EVERY 6 HOURS PRN
Status: DISCONTINUED | OUTPATIENT
Start: 2023-10-26 | End: 2023-10-29 | Stop reason: HOSPADM

## 2023-10-26 RX ORDER — NALOXONE HCL 0.4 MG/ML
0.02 VIAL (ML) INJECTION
Status: DISCONTINUED | OUTPATIENT
Start: 2023-10-26 | End: 2023-10-29 | Stop reason: HOSPADM

## 2023-10-26 RX ORDER — IBUPROFEN 200 MG
24 TABLET ORAL
Status: DISCONTINUED | OUTPATIENT
Start: 2023-10-26 | End: 2023-10-29 | Stop reason: HOSPADM

## 2023-10-26 RX ORDER — ACETAMINOPHEN 325 MG/1
650 TABLET ORAL EVERY 4 HOURS PRN
Status: DISCONTINUED | OUTPATIENT
Start: 2023-10-26 | End: 2023-10-29 | Stop reason: HOSPADM

## 2023-10-26 RX ORDER — ONDANSETRON 2 MG/ML
4 INJECTION INTRAMUSCULAR; INTRAVENOUS EVERY 6 HOURS PRN
Status: DISCONTINUED | OUTPATIENT
Start: 2023-10-26 | End: 2023-10-28

## 2023-10-26 RX ORDER — ENOXAPARIN SODIUM 100 MG/ML
40 INJECTION SUBCUTANEOUS EVERY 24 HOURS
Status: DISCONTINUED | OUTPATIENT
Start: 2023-10-26 | End: 2023-10-29 | Stop reason: HOSPADM

## 2023-10-26 RX ORDER — MIDAZOLAM HYDROCHLORIDE 1 MG/ML
INJECTION INTRAMUSCULAR; INTRAVENOUS
Status: COMPLETED | OUTPATIENT
Start: 2023-10-26 | End: 2023-10-26

## 2023-10-26 RX ADMIN — FENTANYL CITRATE 50 MCG: 0.05 INJECTION, SOLUTION INTRAMUSCULAR; INTRAVENOUS at 09:10

## 2023-10-26 RX ADMIN — MIDAZOLAM HYDROCHLORIDE 1 MG: 2 INJECTION, SOLUTION INTRAMUSCULAR; INTRAVENOUS at 09:10

## 2023-10-26 RX ADMIN — ONDANSETRON 4 MG: 2 INJECTION INTRAMUSCULAR; INTRAVENOUS at 04:10

## 2023-10-26 RX ADMIN — AMPICILLIN 2 G: 2 INJECTION, POWDER, FOR SOLUTION INTRAMUSCULAR; INTRAVENOUS at 04:10

## 2023-10-26 RX ADMIN — ACETAMINOPHEN 650 MG: 325 TABLET ORAL at 06:10

## 2023-10-26 RX ADMIN — ENOXAPARIN SODIUM 40 MG: 40 INJECTION SUBCUTANEOUS at 04:10

## 2023-10-26 RX ADMIN — MORPHINE SULFATE 4 MG: 4 INJECTION INTRAVENOUS at 07:10

## 2023-10-26 RX ADMIN — VANCOMYCIN HYDROCHLORIDE 2000 MG: 10 INJECTION, POWDER, LYOPHILIZED, FOR SOLUTION INTRAVENOUS at 06:10

## 2023-10-26 RX ADMIN — MIRTAZAPINE 30 MG: 30 TABLET, FILM COATED ORAL at 08:10

## 2023-10-26 RX ADMIN — OXYCODONE HYDROCHLORIDE 10 MG: 10 TABLET ORAL at 04:10

## 2023-10-26 RX ADMIN — AMPICILLIN 2 G: 2 INJECTION, POWDER, FOR SOLUTION INTRAMUSCULAR; INTRAVENOUS at 11:10

## 2023-10-26 RX ADMIN — OXYCODONE HYDROCHLORIDE 5 MG: 5 TABLET ORAL at 05:10

## 2023-10-26 RX ADMIN — IOHEXOL 15 ML: 300 INJECTION, SOLUTION INTRAVENOUS at 09:10

## 2023-10-26 NOTE — PROCEDURES
IR Post-Procedure Note    Pre Op Diagnosis: Urinary obstruction    Post Op Diagnosis:  same    Procedure:left percutaneous nephrostomy tube replacement    Procedure performed by: Reina Cho MD / Shiva Sandoval MD    Written Informed Consent Obtained:  Yes    Specimen Removed: No     Estimated Blood Loss:  Minimal     Findings:     Local anesthesia and moderate sedation were used.      Successful replacement of 12 Fr nephrostomy tube     Plan:    Routine exchange with [facility]IR unless another intervention performed earlier.     Reina Cho MD MSCR  PGY-5 Radiology Resident

## 2023-10-26 NOTE — PROGRESS NOTES
VANCOMYCIN DOSING BY PHARMACY DISCONTINUATION NOTE    Edita Riley is a 58 y.o. female who had been consulted for vancomycin dosing.    The pharmacy consult for vancomycin dosing has been discontinued.     Vancomycin Dosing by Pharmacy Consult will sign-off. Please reconsult if necessary. Thank you for allowing us to participate in this patient's care.       Micaela Cummings, PharmD  [id number]

## 2023-10-26 NOTE — ASSESSMENT & PLAN NOTE
Patient has persistent depression which is severe and is currently controlled. Will Continue anti-depressant medications. We will not consult psychiatry at this time. Patient does not display psychosis at this time. Continue to monitor closely and adjust plan of care as needed.

## 2023-10-26 NOTE — PROGRESS NOTES
Urology Progress Note      Patient seen and examined.  L neph tube replaced by IR and draining clear yellow urine.     - no further urologic intervention necessary   - rest of care per primary   - urology to sign off at this time     Please call with further questions or concerns.    Maryann Darnell MD, PGY-2   [facility]

## 2023-10-26 NOTE — PLAN OF CARE
Procedure completed. Patient tolerated well; VSS. Left back site CDI. New 12 Fr nephrostomy tube placed to left side. Patient to be transported to [facility] 5 for 1 hour recovery; report to be given at the bedside. Attempted to call report to inpatient RN; no answer. Will re-attempt later.

## 2023-10-26 NOTE — CONSULTS
[facility] Dept  Urology  Consult Note    Patient Name: Edita Riley  MRN: [id number]  Admission Date: 10/26/2023  Hospital Length of Stay: 0   Code Status: Prior   Attending Provider: Selvin Bustamante MD   Consulting Provider: Dave Tatum MD  Primary Care Physician: Trina Vu MD  Principal Problem:<principal problem not specified>    Inpatient consult to Urology  Consult performed by: Dave Tatum MD  Consult ordered by: Richmond Jerome Jr., MD  Reason for consult: Left nephrostomy tube falling out        Subjective:     HPI:  Edita Riley is a 58 y.o. female with history of bilateral ureteral stricture secondary to XRT for cervical cancer, s/p open transverse colon conduit urinary diversion on 9/11/2020.  Urology consulted for nephrostomy tube falling out.     She has a complicated urologic history and is s/p R PCNL on 4/21.  She passed a clamping trial and was able to have the right nephrostomy tube removed.  The left is still in place, she had an antegrade nephrostogram and a loopogram 6/6/2023 which showed reflux on the right and left distal ureteral stricture beginning at the level of the inferior portion of L4 with no contrast opacifying the ureter past this point. The nephrostomy tube was changed on 9/11/2023 while admitted. She has had multiple episodes of pyelonephritis since August 2023. She is tentatively scheduled for anastomosis revision with Dr. Balbuena in November.    Patient reports noticing neph tube fell out at home. She denies subjective fevers, chills, or flank pain.  No issues with drainage of the L nephrostomy tube prior to this and reports clear yellow urine. Normal ostomy output for stool and urine, respectively.     Upon assessment, she is resting comfortably without pain.     Labs 10/26/23: WBC 8.4 Hgb 11.2 Cr 1.4 (baseline around 1).     No UA or imaging at this time for review. Previous CT 10/21/23 with stable moderate right hydro  and left neph tube in good position.      Past Medical History:   Diagnosis Date    Abnormal mammogram 08/25/2020    Acute blood loss anemia     Acute deep vein thrombosis (DVT) of lower extremity 12/09/2020    Advance care planning 04/30/2021    Anemia due to chronic blood loss     Anemia due to chronic kidney disease     Anxiety     Bilateral ureteral obstruction 9/11/2020    Cardiovascular event risk -- low 09/14/2015    ASCVD 10-year risk 1.9% (with optimal risk factors 1.3%) as of 9/14/2015     Cervical cancer 2014    Chronic back pain     Colostomy care     Deep vein thrombosis     Depression     Diarrhea due to malabsorption 11/14/2018    Difficult intubation     Disorder of kidney and ureter     DVT of lower extremity, bilateral 11/04/2020    Emphysema of lung 4/10/2023    Fibromyalgia     Fungemia 09/27/2020    Generalized abdominal pain 08/25/2020    GERD (gastroesophageal reflux disease)     Hemifacial spasm 09/16/2015    Hiatal hernia 2014    History of cervical cancer 10/11/2018    Hx of psychiatric care     Cymbalta, trazodone    Hypertension     Hypomagnesemia 11/21/2018    Lactose intolerance     Metastatic squamous cell carcinoma to lymph node 10/11/2018    Neuropathy due to chemotherapeutic drug 11/14/2018    Osteoarthritis of back     Peritonitis 09/22/2020    Pseudomonas urinary tract infection 04/21/2021    Psychiatric problem     Refusal of blood transfusions as patient is L[employer]     Estephanieki's ring 09/14/2015    Seen on outside EGD 05/2014, underwent esophageal dilatation. Bx were negative.     Seizures     Sleep stage dysfunction     Noted on PSG 06/2017; negative for obstructive sleep apnea     Stroke     Urinary tract infection associated with nephrostomy catheter 06/11/2020    Wound infection after surgery 09/24/2020       Past Surgical History:   Procedure Laterality Date    ANTEGRADE NEPHROSTOGRAPHY Bilateral 9/28/2020    Procedure:  Nephrostogram - antegrade;  Surgeon: Leslie Balbuena MD;  Location: [facility] OR 1ST FLR;  Service: Urology;  Laterality: Bilateral;    ANTEGRADE NEPHROSTOGRAPHY Left 4/20/2021    Procedure: NEPHROSTOGRAM, ANTEGRADE;  Surgeon: Leslie Balbuena MD;  Location: [facility] OR 1ST FLR;  Service: Urology;  Laterality: Left;    ANTEGRADE NEPHROSTOGRAPHY Right 10/27/2022    Procedure: NEPHROSTOGRAM, ANTEGRADE;  Surgeon: Chirag Russ MD;  Location: [facility] OR 1ST FLR;  Service: Urology;  Laterality: Right;    ANTEGRADE NEPHROSTOGRAPHY Right 4/21/2023    Procedure: NEPHROSTOGRAM, ANTEGRADE;  Surgeon: Chirag Russ MD;  Location: [facility] OR 2ND FLR;  Service: Urology;  Laterality: Right;    ANTEGRADE NEPHROSTOGRAPHY Left 6/6/2023    Procedure: Nephrostogram - antegrade;  Surgeon: Leslie Balbuena MD;  Location: [facility] OR 1ST FLR;  Service: Urology;  Laterality: Left;    BILATERAL OOPHORECTOMY  2015    CHOLECYSTECTOMY  11/09/2016    Done at [facility], path showed chronic cholecystitis and gallstones    cold knife conization  2014    COLECTOMY, RIGHT  9/17/2020    Procedure: COLECTOMY, RIGHT Extended;  Surgeon: Hammad Reynolds MD;  Location: [facility] OR 2ND FLR;  Service: Colon and Rectal;;    COLONOSCOPY  2014    [facility]    COLONOSCOPY N/A 5/18/2018    Procedure: COLONOSCOPY;  Surgeon: Arden Gutiérrez MD;  Location: [facility] ENDO (4TH FLR);  Service: Endoscopy;  Laterality: N/A;    COLONOSCOPY N/A 7/28/2020    Procedure: COLONOSCOPY;  Surgeon: Hammad Reynolds MD;  Location: [facility] ENDO (4TH FLR);  Service: Colon and Rectal;  Laterality: N/A;  covid test [place] 7/25    CYSTOSCOPY WITH URETEROSCOPY, RETROGRADE PYELOGRAPHY, AND INSERTION OF STENT Bilateral 3/21/2020    Procedure: CYSTOSCOPY, WITH RETROGRADE PYELOGRAM,;  Surgeon: Leslie Balbuena MD;  Location: [facility] OR 1ST FLR;  Service: Urology;  Laterality: Bilateral;    DILATION OF NEPHROSTOMY TRACT Right 10/27/2022    Procedure:  DILATION, NEPHROSTOMY TRACT;  Surgeon: Chirag Russ MD;  Location: [facility] OR 1ST FLR;  Service: Urology;  Laterality: Right;    ESOPHAGOGASTRODUODENOSCOPY  2014    ESOPHAGOGASTRODUODENOSCOPY  11/18/2020    ESOPHAGOGASTRODUODENOSCOPY N/A 11/18/2020    Procedure: ESOPHAGOGASTRODUODENOSCOPY (EGD);  Surgeon: Zenon Spencer MD;  [facility];  Service: Endoscopy;  Laterality: N/A;    ESOPHAGOGASTRODUODENOSCOPY N/A 12/11/2020    Procedure: EGD (ESOPHAGOGASTRODUODENOSCOPY);  Surgeon: Juancho Muse MD;  Location: [facility] (2ND FLR);  Service: Endoscopy;  Laterality: N/A;    HYSTERECTOMY  2014    [facility] for cervical cancer    ILEOSTOMY  9/17/2020    Procedure: CREATION, ILEOSTOMY;  Surgeon: Hammad Reynolds MD;  Location: [facility] OR 2ND FLR;  Service: Colon and Rectal;;    LOOPOGRAM N/A 4/20/2021    Procedure: LOOPOGRAM;  Surgeon: Leslie Balbuena MD;  Location: [facility] OR 1ST FLR;  Service: Urology;  Laterality: N/A;    LOOPOGRAM N/A 6/6/2023    Procedure: LOOPOGRAM;  Surgeon: Leslie Balbuena MD;  Location: [facility] OR 1ST FLR;  Service: Urology;  Laterality: N/A;    MOBILIZATION OF SPLENIC FLEXURE N/A 9/11/2020    Procedure: MOBILIZATION, COLONIC;  Surgeon: Hammad Reynolds MD;  Location: [facility] OR 2ND FLR;  Service: Colon and Rectal;  Laterality: N/A;    NEPHROSTOGRAPHY Bilateral 4/17/2021    Procedure: Nephrostogram;  Surgeon: Celeste Surgeon;  Location: [facility];  Service: Anesthesiology;  Laterality: Bilateral;    OOPHORECTOMY      PERCUTANEOUS NEPHROLITHOTOMY Right 4/21/2023    Procedure: NEPHROLITHOTOMY, PERCUTANEOUS;  Surgeon: Chirag Russ MD;  Location: [facility] OR 2ND FLR;  Service: Urology;  Laterality: Right;  2.5 hrs    PERCUTANEOUS NEPHROSTOMY  4/21/2023    Procedure: CREATION, NEPHROSTOMY, PERCUTANEOUS with removal of existing nephrostomy tube;  Surgeon: Chirag Russ MD;  Location: [facility] OR 2ND FLR;  Service: Urology;;    PYELOSCOPY Right 10/27/2022    Procedure: PYELOSCOPY;   Surgeon: Chirag Russ MD;  [facility];  Service: Urology;  Laterality: Right;    REPLACEMENT OF NEPHROSTOMY TUBE Right 10/27/2022    Procedure: REPLACEMENT, NEPHROSTOMY TUBE;  Surgeon: Chirag Russ MD;  Location: [facility] OR 1ST FLR;  Service: Urology;  Laterality: Right;    RETROPERITONEAL LYMPHADENECTOMY Right 9/17/2018    Procedure: LYMPHADENECTOMY, RETROPERITONEUM;  Surgeon: Sebas Reed MD;  Location: [facility] OR 2ND FLR;  Service: General;  Laterality: Right;  ILIAC    URETEROSCOPIC REMOVAL OF URETERIC CALCULUS Right 10/27/2022    Procedure: REMOVAL, CALCULUS, URETER, URETEROSCOPIC;  Surgeon: Chirag Russ MD;  [facility];  Service: Urology;  Laterality: Right;    URETEROSCOPY Right 10/27/2022    Procedure: URETEROSCOPY-ANTEGRADE;  Surgeon: Chirag Russ MD;  Location: [facility] OR [address] [place] [address];  Service: Urology;  Laterality: Right;       Review of patient's allergies indicates:   Allergen Reactions    Bee sting [allergen ext-venom-honey bee]      Rash      Grass pollen-bermuda, standard      rash       Family History       Problem Relation (Age of Onset)    Breast cancer Maternal Aunt    Cancer Father, Mother    Cervical cancer Mother    Colon cancer Father    Drug abuse Daughter, Daughter    Esophageal cancer Father    Heart disease Sister, Maternal Grandmother    Suicide Daughter            Tobacco Use    Smoking status: Never    Smokeless tobacco: Never   Substance and Sexual Activity    Alcohol use: No     Alcohol/week: 0.0 standard drinks of alcohol    Drug use: No    Sexual activity: Yes     Partners: Male     Birth control/protection: None     Comment:  19 years since 1999       Review of Systems   Constitutional:  Negative for activity change, chills, diaphoresis and fever.   HENT: Negative.     Eyes: Negative.    Respiratory: Negative.  Negative for shortness of breath.    Cardiovascular: Negative.  Negative for chest pain.    Gastrointestinal:  Negative for abdominal pain, constipation, diarrhea, nausea and vomiting.   Musculoskeletal: Negative.    Skin: Negative.    Neurological: Negative.    Psychiatric/Behavioral: Negative.         Objective:     Temp:  [98 °F (36.7 °C)] 98 °F (36.7 °C)  Pulse:  [74-88] 74  Resp:  [17] 17  SpO2:  [98 %-100 %] 98 %  BP: (100-119)/(52-58) 102/55  Weight: 76.2 kg (168 lb)  Body mass index is 24.81 kg/m².           Drains       Drain  Duration                  Urostomy 09/11/20 0000 ileal conduit LLQ 1140 days         Ileostomy 09/18/20 RLQ 1133 days         Nephrostomy 09/11/23 1011 Left 12 Fr. 44 days                     Physical Exam  Vitals and nursing note reviewed.   Constitutional:       General: She is not in acute distress.  HENT:      Head: Atraumatic.      Nose: Nose normal.   Eyes:      Extraocular Movements: Extraocular movements intact.   Cardiovascular:      Rate and Rhythm: Normal rate.   Pulmonary:      Effort: Pulmonary effort is normal.   Abdominal:      General: Abdomen is flat. There is no distension.      Tenderness: There is no abdominal tenderness. There is no right CVA tenderness or left CVA tenderness.      Comments: RLQ ostomy with liquid stool, pink and patent ostomy  LLQ ostomy with clear yellow urine, pink and patent ostomy  Left neph tube insertion site without erythema, nontender to palpation   Musculoskeletal:         General: Normal range of motion.      Cervical back: Normal range of motion.   Skin:     Coloration: Skin is not jaundiced.   Neurological:      General: No focal deficit present.      Mental Status: She is alert and oriented to person, place, and time.   Psychiatric:         Mood and Affect: Mood normal.         Behavior: Behavior normal.          Significant Labs:    BMP:  Recent Labs   Lab 10/21/23  1757 10/26/23  0305    141   K 4.0 4.0    111*   CO2 21* 19*   BUN 17 19   CREATININE 1.3 1.4   CALCIUM 9.4 9.3       CBC:  Recent Labs   Lab  "10/21/23  1757 10/26/23  0305   WBC 9.30 8.36   HGB 11.7* 11.2*   HCT 38.4 36.8*    262       Blood Culture: No results for input(s): "LABBLOO" in the last 168 hours.  Urine Culture:   Recent Labs   Lab 10/21/23  2018   LABURIN ENTEROCOCCUS FAECALIS  >100,000 cfu/ml  No other significant isolate  *     Urine Studies:   Recent Labs   Lab 10/21/23  2018   COLORU Yellow   APPEARANCEUA Hazy*   PHUR 6.0   SPECGRAV 1.020   PROTEINUA 1+*   GLUCUA Negative   KETONESU Negative   BILIRUBINUA Negative   OCCULTUA 2+*   NITRITE Negative   LEUKOCYTESUR 3+*   RBCUA >100*   WBCUA >100*   BACTERIA Few*   HYALINECASTS 3*       Significant Imaging:  None for review at this time.        Assessment and Plan:     Nephrostomy status  59 yo female with above comorbidities. Urology consulted for left nephrostomy tube falling out.    - Recommend consult to IR for left nephrostomy tube placement  - Recommend admission to hospital medicine  - Labs nonconcerning at this time  - No anticipated urologic intervention this admission        VTE Risk Mitigation (From admission, onward)    None          Thank you for your consult.     Dave Tatum MD  Urology  [facility]  "

## 2023-10-26 NOTE — HPI
Edita Hardin [facility] is a 58 y.o. female with history of bilateral ureteral stricture secondary to XRT for cervical cancer, s/p open transverse colon conduit urinary diversion on 9/11/2020.  Urology consulted for nephrostomy tube falling out.     She has a complicated urologic history and is s/p R PCNL on 4/21.  She passed a clamping trial and was able to have the right nephrostomy tube removed.  The left is still in place, she had an antegrade nephrostogram and a loopogram 6/6/2023 which showed reflux on the right and left distal ureteral stricture beginning at the level of the inferior portion of L4 with no contrast opacifying the ureter past this point. The nephrostomy tube was changed on 9/11/2023 while admitted. She has had multiple episodes of pyelonephritis since August 2023. She is tentatively scheduled for anastomosis revision with Dr. Balbuena in November.    Patient reports noticing neph tube fell out at home. She denies subjective fevers, chills, or flank pain.  No issues with drainage of the L nephrostomy tube prior to this and reports clear yellow urine. Normal ostomy output for stool and urine, respectively.     Upon assessment, she is resting comfortably without pain.     Labs 10/26/23: WBC 8.4 Hgb 11.2 Cr 1.4 (baseline around 1).     No UA or imaging at this time for review. Previous CT 10/21/23 with stable moderate right hydro and left neph tube in good position.

## 2023-10-26 NOTE — PLAN OF CARE
Pt arrived to IR Room 190 for left nephrostomy tube placement . Pt oriented to unit and staff. Plan of care reviewed with patient, patient verbalizes understanding. Comfort measures utilized. Pt safely transferred from stretcher to procedural table. Fall risk reviewed with patient, fall risk interventions maintained. Safety strap applied, positioner pillows utilized to minimize pressure points. Blankets applied. Pt prepped and draped utilizing standard sterile technique. Patient placed on continuous monitoring, as required by sedation policy. Timeouts completed utilizing standard universal time-out, per department and facility policy. RN to remain at bedside, continuous monitoring maintained. Pt resting comfortably. Denies pain/discomfort. Will continue to monitor. See flow sheets for monitoring, medication administration, and updates.

## 2023-10-26 NOTE — HPI
Ms. Riley is a 57 y/o female with a PMH of b/t ureteral sticture secondary to radiation for cervical cancer, s/p open transverse colon conduit urinary diversion on 9/11/2020, MDD, b/t nephrostomy tubes ( right side tube has been removed, left was left in place), multiple episodes of pyelonephritis since August 2023 presenting due to her Left nephrostomy tube falling out. Patient reports that she was changing her colostomy bag when she noticed that her left nephrostomy tube had fallen out. Patient reports that nothing seemed to incite the tube falling out, she reports that it seemed to dislodge and come out on it's own. Patient denies fever, body aches, headaches, N/V/D, dizziness, chest pain, and SOB. Patient does admit to abdominal pain that has been ongoing these past few days which she recently went to the ED for on 10/21/23. Patient reports that she was found to have a UTI, micro shows E. Faecalis. She reports that she received a call about initiating IV Abx but has not heard back in the past few days. She reports flank pain near L nephrostomy site as well. Patient reports she ws supposed to have two surgeries in November. One on 11/6/23 with CRS and the other on 11/28/23 with Urology. She reports normal ostomy output and no evidence of bleeding.     In the ED, patient is resting in bed. She is reporting flank pain and abdominal pain. She is HDS stable and afebrile. ED has consulted Urology and IR for left nephrostomy tube placement. Urology planning no intervention at this time. CT on 10/21/23 revealed stable moderate to severe right hydronephrosis. Wbc wnl. Cr 1.4 baseline is around 1.0. And Hgb 11.2, patient reports no active bleeding.

## 2023-10-26 NOTE — PROGRESS NOTES
Pharmacist Renal Dose Adjustment Note    Edita Riley is a 58 y.o. female being treated with the medication ampicillin     Patient Data:    Vital Signs (Most Recent):  Temp: 98.9 °F (37.2 °C) (10/26/23 1558)  Pulse: 81 (10/26/23 1558)  Resp: 18 (10/26/23 1558)  BP: (!) 109/58 (10/26/23 1558)  SpO2: 96 % (10/26/23 1558) Vital Signs (72h Range):  Temp:  [96.9 °F (36.1 °C)-98.9 °F (37.2 °C)]   Pulse:  [65-88]   Resp:  [10-20]   BP: (100-177)/(52-75)   SpO2:  [96 %-100 %]      Recent Labs   Lab 10/21/23  1757 10/26/23  0305   CREATININE 1.3 1.4     Serum creatinine: 1.4 mg/dL 10/26/23 0305  Estimated creatinine clearance: 49.5 mL/min    Ampicillin 1.5 g every 8 hours was changed to 2 g every 6 hours per pharmacy renal dosing protocol for severe infections.     Pharmacist's Name: Micaela Cummings  Pharmacist's Extension: [phone]

## 2023-10-26 NOTE — CONSULTS
Interventional Radiology   Consult Note      Date: 10/26/2023   Primary team: [facility], Kathryn Taylor*   Room/bed: 622/622 A    Inpatient consult to Interventional Radiology  Consult performed by: Susana Phillips PA-C  Consult ordered by: Richmond Jerome Jr., MD           Reason for Consult:   nephrostomy tube out     History of Present Illness:  Edita Riley is a 58 y.o. female with a history of bilateral ureteral stricture secondary to XRT for cervical cancer, s/p open transverse colon conduit urinary diversion on 9/11/2020, L neph tube in place last exchanged by IR 9/11/23 who presented after her L neph tube came out last night.     ROS:   Review of Systems   Constitutional: Negative.    HENT: Negative.     Respiratory: Negative.     Cardiovascular: Negative.    Gastrointestinal: Negative.    Musculoskeletal: Negative.    Skin: Negative.    Neurological: Negative.    Psychiatric/Behavioral: Negative.            Past Medical History:  Past Medical History:   Diagnosis Date    Abnormal mammogram 08/25/2020    Acute blood loss anemia     Acute deep vein thrombosis (DVT) of lower extremity 12/09/2020    Advance care planning 04/30/2021    Anemia due to chronic blood loss     Anemia due to chronic kidney disease     Anxiety     Bilateral ureteral obstruction 9/11/2020    Cardiovascular event risk -- low 09/14/2015    ASCVD 10-year risk 1.9% (with optimal risk factors 1.3%) as of 9/14/2015     Cervical cancer 2014    Chronic back pain     Colostomy care     Deep vein thrombosis     Depression     Diarrhea due to malabsorption 11/14/2018    Difficult intubation     Disorder of kidney and ureter     DVT of lower extremity, bilateral 11/04/2020    Emphysema of lung 4/10/2023    Fibromyalgia     Fungemia 09/27/2020    Generalized abdominal pain 08/25/2020    GERD (gastroesophageal reflux disease)     Hemifacial spasm 09/16/2015    Hiatal hernia 2014    History of cervical cancer 10/11/2018    Hx  of psychiatric care     Cymbalta, trazodone    Hypertension     Hypomagnesemia 11/21/2018    Lactose intolerance     Metastatic squamous cell carcinoma to lymph node 10/11/2018    Neuropathy due to chemotherapeutic drug 11/14/2018    Osteoarthritis of back     Peritonitis 09/22/2020    Pseudomonas urinary tract infection 04/21/2021    Psychiatric problem     Refusal of blood transfusions as patient is Sabianist     Schatzki's ring 09/14/2015    Seen on outside EGD 05/2014, underwent esophageal dilatation. Bx were negative.     Seizures     Sleep stage dysfunction     Noted on PSG 06/2017; negative for obstructive sleep apnea     Stroke     Urinary tract infection associated with nephrostomy catheter 06/11/2020    Wound infection after surgery 09/24/2020       Past Surgical History:  Past Surgical History:   Procedure Laterality Date    ANTEGRADE NEPHROSTOGRAPHY Bilateral 9/28/2020    Procedure: Nephrostogram - antegrade;  Surgeon: Leslie Balbuena MD;  Location: [facility] OR [address] [place] [address];  Service: Urology;  Laterality: Bilateral;    ANTEGRADE NEPHROSTOGRAPHY Left 4/20/2021    Procedure: NEPHROSTOGRAM, ANTEGRADE;  Surgeon: Leslie Balbuena MD;  Location: [facility] OR [address] [place] [address];  Service: Urology;  Laterality: Left;    ANTEGRADE NEPHROSTOGRAPHY Right 10/27/2022    Procedure: NEPHROSTOGRAM, ANTEGRADE;  Surgeon: Chirag Russ MD;  [facility];  Service: Urology;  Laterality: Right;    ANTEGRADE NEPHROSTOGRAPHY Right 4/21/2023    Procedure: NEPHROSTOGRAM, ANTEGRADE;  Surgeon: Chirag Russ MD;  [facility];  Service: Urology;  Laterality: Right;    ANTEGRADE NEPHROSTOGRAPHY Left 6/6/2023    Procedure: Nephrostogram - antegrade;  Surgeon: Leslie Balbuena MD;  Location: [facility] OR [address] [place] [address];  Service: Urology;  Laterality: Left;    BILATERAL OOPHORECTOMY  2015    CHOLECYSTECTOMY  11/09/2016    Done at [facility], path showed chronic cholecystitis and gallstones    cold knife conization  2014     COLECTOMY, RIGHT  9/17/2020    Procedure: COLECTOMY, RIGHT Extended;  Surgeon: Hammad Reynolds MD;  Location: [facility] OR 2ND FLR;  Service: Colon and Rectal;;    COLONOSCOPY  2014    [facility] N[facility]24/2016    [facility]    COLONOSCOPY N/A 5/18/2018    Procedure: COLONOSCOPY;  Surgeon: Arden Gutiérrez MD;  Location: [facility] (4TH FLR);  Service: Endoscopy;  Laterality: N/A;    COLONOSCOPY N/A 7/28/2020    Procedure: COLONOSCOPY;  Surgeon: Hammad Reynolds MD;  Location: [facility] (4TH FLR);  Service: Colon and Rectal;  Laterality: N/A;  covid test [place] 7/25    CYSTOSCOPY WITH URETEROSCOPY, RETROGRADE PYELOGRAPHY, AND INSERTION OF STENT Bilateral 3/21/2020    Procedure: CYSTOSCOPY, WITH RETROGRADE PYELOGRAM,;  Surgeon: Leslie Balbuena MD;  Location: [facility] OR 1ST FLR;  Service: Urology;  Laterality: Bilateral;    DILATION OF NEPHROSTOMY TRACT Right 10/27/2022    Procedure: DILATION, NEPHROSTOMY TRACT;  Surgeon: Chirag Russ MD;  Location: [facility] OR 1ST FLR;  Service: Urology;  Laterality: Right;    ESOPHAGOGASTRODUODENOSCOPY  2014    ESOPHAGOGASTRODUODENOSCOPY  11/18/2020    ESOPHAGOGASTRODUODENOSCOPY N/A 11/18/2020    Procedure: ESOPHAGOGASTRODUODENOSCOPY (EGD);  Surgeon: Zenon Spencer MD;  Location: [facility];  Service: Endoscopy;  Laterality: N/A;    ESOPHAGOGASTRODUODENOSCOPY N/A 12/11/2020    Procedure: EGD (ESOPHAGOGASTRODUODENOSCOPY);  Surgeon: Juancho Muse MD;  Location: [facility] (2ND FLR);  Service: Endoscopy;  Laterality: N/A;    HYSTERECTOMY  2014    [facility] for cervical cancer    ILEOSTOMY  9/17/2020    Procedure: CREATION, ILEOSTOMY;  Surgeon: Hammad Reynolds MD;  Location: [facility] OR 2ND FLR;  Service: Colon and Rectal;;    LOOPOGRAM N/A 4/20/2021    Procedure: LOOPOGRAM;  Surgeon: Leslie Balbuena MD;  Location: [facility] OR 1ST FLR;  Service: Urology;  Laterality: N/A;    LOOPOGRAM N/A 6/6/2023    Procedure: LOOPOGRAM;  Surgeon: Leslie Balbuena MD;  Location: [facility] OR 1ST FLR;   Service: Urology;  Laterality: N/A;    MOBILIZATION OF SPLENIC FLEXURE N/A 9/11/2020    Procedure: MOBILIZATION, COLONIC;  Surgeon: Hammad Reynolds MD;  Location: [facility] OR 2ND FLR;  Service: Colon and Rectal;  Laterality: N/A;    NEPHROSTOGRAPHY Bilateral 4/17/2021    Procedure: Nephrostogram;  Surgeon: Celeste Surgeon;  Location: [facility];  Service: Anesthesiology;  Laterality: Bilateral;    OOPHORECTOMY      PERCUTANEOUS NEPHROLITHOTOMY Right 4/21/2023    Procedure: NEPHROLITHOTOMY, PERCUTANEOUS;  Surgeon: Chirag Russ MD;  Location: [facility] OR 2ND FLR;  Service: Urology;  Laterality: Right;  2.5 hrs    PERCUTANEOUS NEPHROSTOMY  4/21/2023    Procedure: CREATION, NEPHROSTOMY, PERCUTANEOUS with removal of existing nephrostomy tube;  Surgeon: Chirag Russ MD;  Location: [facility] OR [facility] FLR;  Service: Urology;;    PYELOSCOPY Right 10/27/2022    Procedure: PYELOSCOPY;  Surgeon: Chirag Russ MD;  [facility];  Service: Urology;  Laterality: Right;    REPLACEMENT OF NEPHROSTOMY TUBE Right 10/27/2022    Procedure: REPLACEMENT, NEPHROSTOMY TUBE;  Surgeon: Chirag Russ MD;  [facility];  Service: Urology;  Laterality: Right;    RETROPERITONEAL LYMPHADENECTOMY Right 9/17/2018    Procedure: LYMPHADENECTOMY, RETROPERITONEUM;  Surgeon: Sebas Reed MD;  Location: [facility] OR [facility] FLR;  Service: General;  Laterality: Right;  ILIAC    URETEROSCOPIC REMOVAL OF URETERIC CALCULUS Right 10/27/2022    Procedure: REMOVAL, CALCULUS, URETER, URETEROSCOPIC;  Surgeon: Chirag Russ MD;  Location: [facility] OR [facility] FLR;  Service: Urology;  Laterality: Right;    URETEROSCOPY Right 10/27/2022    Procedure: URETEROSCOPY-ANTEGRADE;  Surgeon: Chirag Russ MD;  Location: [facility] OR [facility] FLR;  Service: Urology;  Laterality: Right;        Sedation History:    No known adverse reactions.     Social History:  Social History     Tobacco Use    Smoking status: Never    Smokeless tobacco: Never   Substance Use  Topics    Alcohol use: No     Alcohol/week: 0.0 standard drinks of alcohol    Drug use: No        Home Medications:   Prior to Admission medications    Medication Sig Start Date End Date Taking? Authorizing Provider   gabapentin (NEURONTIN) 100 MG capsule Take 2 capsules (200 mg total) by mouth every evening. 9/1/22   Danuta Barboza MD   loratadine (CLARITIN) 10 mg tablet Take 10 mg by mouth once daily. 9/5/23   Provider, Historical   mirtazapine (REMERON) 30 MG tablet Take 1 tablet (30 mg total) by mouth nightly. 6/3/23 6/2/24  Mell Adhikari MD   oxyCODONE (ROXICODONE) 5 MG immediate release tablet Take 1 tablet (5 mg total) by mouth every 12 (twelve) hours as needed for Pain. 6/3/23   Mell Adhikari MD   tolterodine (DETROL LA) 2 MG Cp24 Take 2 mg by mouth once daily.    Provider, Historical       Inpatient Medications:    Current Facility-Administered Medications:     acetaminophen tablet 650 mg, 650 mg, Oral, Q4H PRN, Farhud, Samer, DO, 650 mg at 10/26/23 0607    dextrose 10% bolus 125 mL 125 mL, 12.5 g, Intravenous, PRN, Farhud, Samer, DO    dextrose 10% bolus 250 mL 250 mL, 25 g, Intravenous, PRN, Farhud, Samer, DO    enoxaparin injection 40 mg, 40 mg, Subcutaneous, Daily, Farhud, Samer, DO    glucagon (human recombinant) injection 1 mg, 1 mg, Intramuscular, PRN, Farhud, Samer, DO    glucose chewable tablet 16 g, 16 g, Oral, PRN, Farhud, Samer, DO    glucose chewable tablet 24 g, 24 g, Oral, PRN, Farhud, Samer, DO    mirtazapine tablet 30 mg, 30 mg, Oral, Nightly, Farhud, Samer, DO    naloxone 0.4 mg/mL injection 0.02 mg, 0.02 mg, Intravenous, PRN, Farhud, Samer, DO    oxyCODONE immediate release tablet 5 mg, 5 mg, Oral, Q12H PRN, Farhud, Samer, DO    sodium chloride 0.9% flush 10 mL, 10 mL, Intravenous, Q12H PRN, Jai Gonzalez,     [START ON 10/27/2023] vancomycin (VANCOCIN) 1,000 mg in dextrose 5 % (D5W) 250 mL IVPB (Vial-Mate), 1,000 mg, Intravenous, Q24H, Shelbie Duffy MD    Pharmacy to  dose Vancomycin consult, , , Once **AND** vancomycin - pharmacy to dose, , Intravenous, pharmacy to manage frequency, Jai Gonzalez, DO    vancomycin 2 g in dextrose 5 % 500 mL IVPB, 2,000 mg, Intravenous, Once, Shelbie Duffy MD, Last Rate: 250 mL/hr at 10/26/23 0615, 2,000 mg at 10/26/23 0615     Anticoagulants/Antiplatelets:   No anticoagulation    Allergies:   Review of patient's allergies indicates:   Allergen Reactions    Bee sting [allergen ext-venom-honey bee]      Rash      Grass pollen-bermuda, standard      rash       Vital Signs:  Temp: 97.2 °F (36.2 °C) (10/26/23 0754)  Pulse: 71 (10/26/23 0754)  Resp: 16 (10/26/23 0754)  BP: 100/63 (10/26/23 0754)  SpO2: 99 % (10/26/23 0754)    Temp:  [97.2 °F (36.2 °C)-98 °F (36.7 °C)]   Pulse:  [71-88]   Resp:  [16-20]   BP: (100-121)/(52-66)   SpO2:  [97 %-100 %]      Physical Exam:   Physical Exam  Constitutional:       Appearance: Normal appearance.   HENT:      Head: Normocephalic.   Cardiovascular:      Rate and Rhythm: Normal rate.   Abdominal:      General: Abdomen is flat.      Comments: Ostomy in place   Musculoskeletal:      Comments: L neph tube site without tenderness or erythema   Neurological:      Mental Status: She is alert and oriented to person, place, and time. Mental status is at baseline.   Psychiatric:         Mood and Affect: Mood normal.           Sedation Exam:  ASA: II - Patient appears to have mild systemic disease, adequately controlled  Mallampati score: II (hard and soft palate, upper portion of tonsils anduvula visible)    Laboratory:  Lab Results   Component Value Date    INR 1.0 09/11/2023       Lab Results   Component Value Date    WBC 8.36 10/26/2023    HGB 11.2 (L) 10/26/2023    HCT 36.8 (L) 10/26/2023    MCV 92 10/26/2023     10/26/2023      Lab Results   Component Value Date    GLU 98 10/26/2023     10/26/2023    K 4.0 10/26/2023     (H) 10/26/2023    CO2 19 (L) 10/26/2023    BUN 19 10/26/2023    CREATININE  1.4 10/26/2023    CALCIUM 9.3 10/26/2023    MG 1.9 09/14/2023    ALT 13 10/26/2023    AST 15 10/26/2023    ALBUMIN 3.2 (L) 10/26/2023    BILITOT 0.2 10/26/2023    BILIDIR 0.2 12/04/2020           ASSESSMENT/PLAN/RECOMMENDATIONS:   Edita Riley is a 58 y.o. female with a history of bilateral ureteral stricture secondary to XRT for cervical cancer, s/p open transverse colon conduit urinary diversion on 9/11/2020, L neph tube in place last exchanged by IR 9/11/23 who presented after her L neph tube came out last night. Evaluated at bedside, L neph tube completely out. Will replace today.     Sedation Plan: up to moderate  Patient will undergo: L neph tube replacement today 10/26/23    Susana Phillips PA-C  Interventional Radiology  Spectra: [id number]  10/26/2023

## 2023-10-26 NOTE — H&P
[facility] Medicine  History & Physical    Patient Name: Edita Riley  MRN: [id number]  Patient Class: OP- Observation  Admission Date: 10/26/2023  Attending Physician: Kathryn Taylor*   Primary Care Provider: Trina Vu MD         Patient information was obtained from patient and ER records.     Subjective:     Principal Problem:Nephrostomy status    Chief Complaint:   Chief Complaint   Patient presents with    OTHER     Nephrostomy tube came out        HPI: Ms. Riley is a 57 y/o female with a PMH of b/t ureteral sticture secondary to radiation for cervical cancer, s/p open transverse colon conduit urinary diversion on 9/11/2020, MDD, b/t nephrostomy tubes ( right side tube has been removed, left was left in place), multiple episodes of pyelonephritis since August 2023 presenting due to her Left nephrostomy tube falling out. Patient reports that she was changing her colostomy bag when she noticed that her left nephrostomy tube had fallen out. Patient reports that nothing seemed to incite the tube falling out, she reports that it seemed to dislodge and come out on it's own. Patient denies fever, body aches, headaches, N/V/D, dizziness, chest pain, and SOB. Patient does admit to abdominal pain that has been ongoing these past few days which she recently went to the ED for on 10/21/23. Patient reports that she was found to have a UTI, micro shows E. Faecalis. She reports that she received a call about initiating IV Abx but has not heard back in the past few days. She reports flank pain near L nephrostomy site as well. Patient reports she ws supposed to have two surgeries in November. One on 11/6/23 with CRS and the other on 11/28/23 with Urology. She reports normal ostomy output and no evidence of bleeding.     In the ED, patient is resting in bed. She is reporting flank pain and abdominal pain. She is HDS stable and afebrile. ED has consulted Urology and IR for  left nephrostomy tube placement. Urology planning no intervention at this time. CT on 10/21/23 revealed stable moderate to severe right hydronephrosis. Wbc wnl. Cr 1.4 baseline is around 1.0. And Hgb 11.2, patient reports no active bleeding.             Past Medical History:   Diagnosis Date    Abnormal mammogram 08/25/2020    Acute blood loss anemia     Acute deep vein thrombosis (DVT) of lower extremity 12/09/2020    Advance care planning 04/30/2021    Anemia due to chronic blood loss     Anemia due to chronic kidney disease     Anxiety     Bilateral ureteral obstruction 9/11/2020    Cardiovascular event risk -- low 09/14/2015    ASCVD 10-year risk 1.9% (with optimal risk factors 1.3%) as of 9/14/2015     Cervical cancer 2014    Chronic back pain     Colostomy care     Deep vein thrombosis     Depression     Diarrhea due to malabsorption 11/14/2018    Difficult intubation     Disorder of kidney and ureter     DVT of lower extremity, bilateral 11/04/2020    Emphysema of lung 4/10/2023    Fibromyalgia     Fungemia 09/27/2020    Generalized abdominal pain 08/25/2020    GERD (gastroesophageal reflux disease)     Hemifacial spasm 09/16/2015    Hiatal hernia 2014    History of cervical cancer 10/11/2018    Hx of psychiatric care     Cymbalta, trazodone    Hypertension     Hypomagnesemia 11/21/2018    Lactose intolerance     Metastatic squamous cell carcinoma to lymph node 10/11/2018    Neuropathy due to chemotherapeutic drug 11/14/2018    Osteoarthritis of back     Peritonitis 09/22/2020    Pseudomonas urinary tract infection 04/21/2021    Psychiatric problem     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 Bx were negative.     Seizures     Sleep stage dysfunction     Noted on PSG 06/2017; negative for obstructive sleep apnea     Stroke     Urinary tract infection associated with nephrostomy catheter  06/11/2020    Wound infection after surgery 09/24/2020       Past Surgical History:   Procedure Laterality Date    ANTEGRADE NEPHROSTOGRAPHY Bilateral 9/28/2020    Procedure: Nephrostogram - antegrade;  Surgeon: Leslie Balbuena MD;  Location: [facility] OR 1ST FLR;  Service: Urology;  Laterality: Bilateral;    ANTEGRADE NEPHROSTOGRAPHY Left 4/20/2021    Procedure: NEPHROSTOGRAM, ANTEGRADE;  Surgeon: Leslie Balbuena MD;  Location: [facility] OR 1ST FLR;  Service: Urology;  Laterality: Left;    ANTEGRADE NEPHROSTOGRAPHY Right 10/27/2022    Procedure: NEPHROSTOGRAM, ANTEGRADE;  Surgeon: Chirag Russ MD;  Location: [facility] OR 1ST FLR;  Service: Urology;  Laterality: Right;    ANTEGRADE NEPHROSTOGRAPHY Right 4/21/2023    Procedure: NEPHROSTOGRAM, ANTEGRADE;  Surgeon: Chirag Russ MD;  Location: [facility] OR 2ND FLR;  Service: Urology;  Laterality: Right;    ANTEGRADE NEPHROSTOGRAPHY Left 6/6/2023    Procedure: Nephrostogram - antegrade;  Surgeon: Leslie Balbuena MD;  Location: [facility] OR 1ST FLR;  Service: Urology;  Laterality: Left;    BILATERAL OOPHORECTOMY  2015    CHOLECYSTECTOMY  11/09/2016    Done at [facility], path showed chronic cholecystitis and gallstones    cold knife conization  2014    COLECTOMY, RIGHT  9/17/2020    Procedure: COLECTOMY, RIGHT Extended;  Surgeon: Hammad Reynolds MD;  Location: [facility] OR 2ND FLR;  Service: Colon and Rectal;;    COLONOSCOPY  2014    COLONOSCOPY N/A 10/24/2016    at [facility], Dr Thomas    COLONOSCOPY N/A 5/18/2018    Procedure: COLONOSCOPY;  Surgeon: Arden Gutiérrez MD;  Location: [facility] ENDO (4TH FLR);  Service: Endoscopy;  Laterality: N/A;    COLONOSCOPY N/A 7/28/2020    Procedure: COLONOSCOPY;  Surgeon: Hammad Reynolds MD;  Location: [facility] ENDO (4TH FLR);  Service: Colon and Rectal;  Laterality: N/A;  covid test [place] 7/25    CYSTOSCOPY WITH URETEROSCOPY, RETROGRADE PYELOGRAPHY, AND INSERTION OF STENT Bilateral 3/21/2020    Procedure: CYSTOSCOPY, WITH RETROGRADE PYELOGRAM,;  Surgeon:  Leslie Babluena MD;  Location: [facility] OR 1ST FLR;  Service: Urology;  Laterality: Bilateral;    DILATION OF NEPHROSTOMY TRACT Right 10/27/2022    Procedure: DILATION, NEPHROSTOMY TRACT;  Surgeon: Chirag Russ MD;  Location: [facility] OR 1ST FLR;  Service: Urology;  Laterality: Right;    ESOPHAGOGASTRODUODENOSCOPY  2014    ESOPHAGOGASTRODUODENOSCOPY  11/18/2020    ESOPHAGOGASTRODUODENOSCOPY N/A 11/18/2020    Procedure: ESOPHAGOGASTRODUODENOSCOPY (EGD);  Surgeon: Zenon Spencer MD;  Location: [facility] ENDO;  Service: Endoscopy;  Laterality: N/A;    ESOPHAGOGASTRODUODENOSCOPY N/A 12/11/2020    Procedure: EGD (ESOPHAGOGASTRODUODENOSCOPY);  Surgeon: Juancho Muse MD;  Location: [facility] ENDO (2ND FLR);  Service: Endoscopy;  Laterality: N/A;    HYSTERECTOMY  2014    [facility] for cervical cancer    ILEOSTOMY  9/17/2020    Procedure: CREATION, ILEOSTOMY;  Surgeon: Hammad Reynolds MD;  Location: [facility] OR 2ND FLR;  Service: Colon and Rectal;;    LOOPOGRAM N/A 4/20/2021    Procedure: LOOPOGRAM;  Surgeon: Leslie Balbuena MD;  Location: [facility] OR 1ST FLR;  Service: Urology;  Laterality: N/A;    LOOPOGRAM N/A 6/6/2023    Procedure: LOOPOGRAM;  Surgeon: Leslie Balbuena MD;  Location: [facility] OR 1ST FLR;  Service: Urology;  Laterality: N/A;    MOBILIZATION OF SPLENIC FLEXURE N/A 9/11/2020    Procedure: MOBILIZATION, COLONIC;  Surgeon: Hammad Reynolds MD;  Location: [facility] OR 2ND FLR;  Service: Colon and Rectal;  Laterality: N/A;    NEPHROSTOGRAPHY Bilateral 4/17/2021    Procedure: Nephrostogram;  Surgeon: Celeste Surgeon;  Location: [facility] CELESTE;  Service: Anesthesiology;  Laterality: Bilateral;    OOPHORECTOMY      PERCUTANEOUS NEPHROLITHOTOMY Right 4/21/2023    Procedure: NEPHROLITHOTOMY, PERCUTANEOUS;  Surgeon: Chirag Russ MD;  Location: [facility] OR 2ND FLR;  Service: Urology;  Laterality: Right;  2.5 hrs    PERCUTANEOUS NEPHROSTOMY  4/21/2023    Procedure: CREATION, NEPHROSTOMY, PERCUTANEOUS with removal of existing nephrostomy tube;   Surgeon: Chirag Russ MD;  [facility];  Service: Urology;;    PYELOSCOPY Right 10/27/2022    Procedure: PYELOSCOPY;  Surgeon: Chirag Russ MD;  [facility];  Service: Urology;  Laterality: Right;    REPLACEMENT OF NEPHROSTOMY TUBE Right 10/27/2022    Procedure: REPLACEMENT, NEPHROSTOMY TUBE;  Surgeon: Chirag Russ MD;  [facility];  Service: Urology;  Laterality: Right;    RETROPERITONEAL LYMPHADENECTOMY Right 9/17/2018    Procedure: LYMPHADENECTOMY, RETROPERITONEUM;  Surgeon: Sebas Reed MD;  Location: [facility] OR 2ND FLR;  Service: General;  Laterality: Right;  ILIAC    URETEROSCOPIC REMOVAL OF URETERIC CALCULUS Right 10/27/2022    Procedure: REMOVAL, CALCULUS, URETER, URETEROSCOPIC;  Surgeon: Chirag Russ MD;  Location: [facility] OR [address] [place] [address];  Service: Urology;  Laterality: Right;    URETEROSCOPY Right 10/27/2022    Procedure: URETEROSCOPY-ANTEGRADE;  Surgeon: Chirag Russ MD;  Location: [facility] OR [address] [place] [address];  Service: Urology;  Laterality: Right;       Review of patient's allergies indicates:   Allergen Reactions    Bee sting [allergen ext-venom-honey bee]      Rash      Grass pollen-bermuda, standard      rash       No current facility-administered medications on file prior to encounter.     Current Outpatient Medications on File Prior to Encounter   Medication Sig    gabapentin (NEURONTIN) 100 MG capsule Take 2 capsules (200 mg total) by mouth every evening.    loratadine (CLARITIN) 10 mg tablet Take 10 mg by mouth once daily.    mirtazapine (REMERON) 30 MG tablet Take 1 tablet (30 mg total) by mouth nightly.    oxyCODONE (ROXICODONE) 5 MG immediate release tablet Take 1 tablet (5 mg total) by mouth every 12 (twelve) hours as needed for Pain.    tolterodine (DETROL LA) 2 MG Cp24 Take 2 mg by mouth once daily.     Family History       Problem Relation (Age of Onset)    Breast cancer Maternal Aunt    Cancer Father, Mother    Cervical cancer Mother     Colon cancer Father    Drug abuse Daughter, Daughter    Esophageal cancer Father    Heart disease Sister, Maternal Grandmother    Suicide Daughter          Tobacco Use    Smoking status: Never    Smokeless tobacco: Never   Substance and Sexual Activity    Alcohol use: No     Alcohol/week: 0.0 standard drinks of alcohol    Drug use: No    Sexual activity: Yes     Partners: Male     Birth control/protection: None     Comment:  19 years since 1999     Review of Systems   Constitutional:  Positive for chills. Negative for activity change, appetite change, fatigue, fever and unexpected weight change.   HENT:  Negative for sinus pain and sore throat.    Respiratory:  Negative for cough, choking, chest tightness, shortness of breath, wheezing and stridor.    Cardiovascular:  Negative for chest pain, palpitations and leg swelling.   Gastrointestinal:  Positive for abdominal pain. Negative for nausea and vomiting.   Genitourinary:         Patient had left nephrostomy bag with clear to yellow urine prior to it being removed    Musculoskeletal:  Positive for arthralgias.   Neurological:  Negative for dizziness, light-headedness and headaches.   Psychiatric/Behavioral:  Negative for agitation, confusion and hallucinations.      Objective:     Vital Signs (Most Recent):  Temp: 97.2 °F (36.2 °C) (10/26/23 0754)  Pulse: 71 (10/26/23 0754)  Resp: 16 (10/26/23 0754)  BP: 100/63 (10/26/23 0754)  SpO2: 99 % (10/26/23 0754) Vital Signs (24h Range):  Temp:  [97.2 °F (36.2 °C)-98 °F (36.7 °C)] 97.2 °F (36.2 °C)  Pulse:  [71-88] 71  Resp:  [16-20] 16  SpO2:  [97 %-100 %] 99 %  BP: (100-121)/(52-66) 100/63     Weight: 84.3 kg (185 lb 13.6 oz)  Body mass index is 27.44 kg/m².     Physical Exam  Constitutional:       Appearance: Normal appearance. She is not ill-appearing.   HENT:      Head: Normocephalic and atraumatic.      Nose: Nose normal.      Mouth/Throat:      Mouth: Mucous membranes are moist.   Eyes:      Extraocular  Movements: Extraocular movements intact.   Cardiovascular:      Rate and Rhythm: Normal rate and regular rhythm.      Pulses: Normal pulses.      Heart sounds: Normal heart sounds. No murmur heard.     No friction rub.   Pulmonary:      Effort: Pulmonary effort is normal.      Breath sounds: Normal breath sounds.   Abdominal:      Tenderness: There is abdominal tenderness.      Comments: Patient has colostomy bag with normal output and ileostomy bag with normal output. She also has a mid abdominal scar that seems to be well healed.    Musculoskeletal:         General: No swelling or tenderness.      Comments: Left flank pain near nephrostomy tube site which was removed. No drainage and no erythema noted    Skin:     General: Skin is warm.      Capillary Refill: Capillary refill takes less than 2 seconds.   Neurological:      General: No focal deficit present.      Mental Status: She is alert and oriented to person, place, and time.   Psychiatric:         Mood and Affect: Affect is flat.                Significant Labs: All pertinent labs within the past 24 hours have been reviewed.    Significant Imaging: I have reviewed all pertinent imaging results/findings within the past 24 hours.  Assessment/Plan:     * Nephrostomy status  Patient has a PMH of b/t ureteral sticture secondary to radiation for cervical cancer, b/t nephrostomy tubes ( right side tube has been removed, left was left in place), presenting due to her Left nephrostomy tube falling out. Patient reports that she was changing her colostomy bag when she noticed that her left nephrostomy tube had fallen out. Patient reports that nothing seemed to incite the tube falling out, she reports that it seemed to dislodge and come out on it's own. Patient denies fever, body aches, headaches, N/V/D, dizziness, chest pain, and SOB.  She does report flank pain near L nephrostomy site but no drainage. Patient reports she ws supposed to have two surgeries in November.  One on 11/6/23 with CRS and the other on 11/28/23 with Urology. She reports normal ostomy output and no evidence of bleeding.     In the ED, patient was evaluated by urology and they reported no anticipated urologic intervention this admission.     PLAN:   - IR Consult placed in ED, plan for replacement upon admission. Appreciate recs   - pain control with prn meds   - Urology f/u upon discharge. Patient was schedule for urological procedure on 11/28/23       UTI (urinary tract infection)  Patient admits to abdominal pain that has been ongoing these past few days which she recently went to the ED for on 10/21/23. Patient reports that she was found to have a UTI, micro shows E. Faecalis. She reports that she received a call about initiating IV Abx but has not heard back in the past few days. She reports flank pain near L nephrostomy site as well.     PLAN:   - Based on susceptibilities, starting treatment with Vancomycin           Major depressive disorder, recurrent episode, moderate  Patient has persistent depression which is severe and is currently controlled. Will Continue anti-depressant medications. We will not consult psychiatry at this time. Patient does not display psychosis at this time. Continue to monitor closely and adjust plan of care as needed.        Anemia due to chronic kidney disease  Patient has Hgb of 11.2 upon admission. Hgb is chronically low. Patient is having no active bleeding. No evidence in ostomy bags either.     PLAN:  - Continue to monitor CBC   - Transfuse for Hgb < 7   - Monitor for signs of active bleeding from ostomy bag           ODESSA (acute kidney injury)  Patient with acute kidney injury/acute renal failure likely due to post-obstructive d/t Ureteral strictures ODESSA is currently stable. Baseline creatinine 1.0 - Labs reviewed- Renal function/electrolytes with Estimated Creatinine Clearance: 49.5 mL/min (based on SCr of 1.4 mg/dL). according to latest data. Monitor urine output and  "serial BMP and adjust therapy as needed. Avoid nephrotoxins and renally dose meds for GFR listed above.       CT scan from 10/21/23 revealed "  Left-sided nephrostomy tube in place with mild prominence of the left renal collecting system as well as moderate to severe right-sided hydronephrosis, stable from prior examination."     PLAN:   - Nephrostomy tube placement with IR   - If creatinine does not improve consider imaging, retroperitoneal U/S    History of essential hypertension  Patient reports hx of HTN. In the ED, pressures are soft. She reports she is not on any anti-HTN medication at home     PLAN:   - Continue to monitor patient BP, consider starting prn anti-hypertensive if BP elevated        VTE Risk Mitigation (From admission, onward)           Ordered     enoxaparin injection 40 mg  Daily         10/26/23 0446     IP VTE HIGH RISK PATIENT  Once         10/26/23 0446     Place sequential compression device  Until discontinued         10/26/23 0446                           On 10/26/2023, patient should be placed in hospital observation services under my care in collaboration with Dr. Taylor.        Jai Gonzalez DO  Department of Hospital Medicine  [facility] Surg          "

## 2023-10-26 NOTE — Clinical Note
A pre-sedation assessment was completed by the physician immediately prior to sedation start.     Patient receiving IV Vanco on the floor; last dose at 0630 this am. Per Dr. Sandoval; no additional antibiotics needed for procedure.

## 2023-10-26 NOTE — ED PROVIDER NOTES
Encounter Date: 10/26/2023       History     Chief Complaint   Patient presents with    OTHER     Nephrostomy tube came out     Pt is a 58 year old female with PMHx significant for cervical cancer  s/p chemotherapy and radiation therapy, c/b bilateral ureteral strictures s/p bilateral nephrostomy tubes(R has since been removed, left remains) who presents after her left nephrostomy tube fell out. Denies fever, flank pain, nausea, or vomiting    The history is provided by the patient.     Review of patient's allergies indicates:   Allergen Reactions    Bee sting [allergen ext-venom-honey bee]      Rash      Grass pollen-bermuda, standard      rash     Past Medical History:   Diagnosis Date    Abnormal mammogram 08/25/2020    Acute blood loss anemia     Acute deep vein thrombosis (DVT) of lower extremity 12/09/2020    Advance care planning 04/30/2021    Anemia due to chronic blood loss     Anemia due to chronic kidney disease     Anxiety     Bilateral ureteral obstruction 9/11/2020    Cardiovascular event risk -- low 09/14/2015    ASCVD 10-year risk 1.9% (with optimal risk factors 1.3%) as of 9/14/2015     Cervical cancer 2014    Chronic back pain     Colostomy care     Deep vein thrombosis     Depression     Diarrhea due to malabsorption 11/14/2018    Difficult intubation     Disorder of kidney and ureter     DVT of lower extremity, bilateral 11/04/2020    Emphysema of lung 4/10/2023    Fibromyalgia     Fungemia 09/27/2020    Generalized abdominal pain 08/25/2020    GERD (gastroesophageal reflux disease)     Hemifacial spasm 09/16/2015    Hiatal hernia 2014    History of cervical cancer 10/11/2018    Hx of psychiatric care     Cymbalta, trazodone    Hypertension     Hypomagnesemia 11/21/2018    Lactose intolerance     Metastatic squamous cell carcinoma to lymph node 10/11/2018    Neuropathy due to chemotherapeutic drug 11/14/2018    Osteoarthritis of back     Peritonitis 09/22/2020    Pseudomonas urinary tract  infection 04/21/2021    Psychiatric problem     Refusal of blood transfusions as patient is Jew     Schatzki's ring 09/14/2015    Seen on outside EGD 05/2014, underwent esophageal dilatation. Bx were negative.     Seizures     Sleep stage dysfunction     Noted on PSG 06/2017; negative for obstructive sleep apnea     Stroke     Urinary tract infection associated with nephrostomy catheter 06/11/2020    Wound infection after surgery 09/24/2020     Past Surgical History:   Procedure Laterality Date    ANTEGRADE NEPHROSTOGRAPHY Bilateral 9/28/2020    Procedure: Nephrostogram - antegrade;  Surgeon: Leslie Balbuena MD;  Location: [facility] OR 1ST FLR;  Service: Urology;  Laterality: Bilateral;    ANTEGRADE NEPHROSTOGRAPHY Left 4/20/2021    Procedure: NEPHROSTOGRAM, ANTEGRADE;  Surgeon: Leslie Balbuena MD;  Location: [facility] OR 1ST FLR;  Service: Urology;  Laterality: Left;    ANTEGRADE NEPHROSTOGRAPHY Right 10/27/2022    Procedure: NEPHROSTOGRAM, ANTEGRADE;  Surgeon: Chirag Russ MD;  Location: [facility] OR 1ST FLR;  Service: Urology;  Laterality: Right;    ANTEGRADE NEPHROSTOGRAPHY Right 4/21/2023    Procedure: NEPHROSTOGRAM, ANTEGRADE;  Surgeon: Chirag Russ MD;  Location: [facility] OR 2ND FLR;  Service: Urology;  Laterality: Right;    ANTEGRADE NEPHROSTOGRAPHY Left 6/6/2023    Procedure: Nephrostogram - antegrade;  Surgeon: Leslie Balbuena MD;  [facility];  Service: Urology;  Laterality: Left;    BILATERAL OOPHORECTOMY  2015    CHOLECYSTECTOMY  11/09/2016    Done at [facility], path showed chronic cholecystitis and gallstones    cold knife conization  2014    COLECTOMY, RIGHT  9/17/2020    Procedure: COLECTOMY, RIGHT Extended;  Surgeon: Hammad Reynolds MD;  Location: [facility] OR 2ND FLR;  Service: Colon and Rectal;;    COLONOSCOPY  2014    [facility]2016    [facility]    COLONOSCOPY N/A 5/18/2018    Procedure: COLONOSCOPY;  Surgeon: Arden Gutiérrez MD;  Location: [facility]  (4TH FLR);  Service: Endoscopy;  Laterality: N/A;    COLONOSCOPY N/A 7/28/2020    Procedure: COLONOSCOPY;  Surgeon: Hammad Reynolds MD;  Location: [facility] (4TH FLR);  Service: Colon and Rectal;  Laterality: N/A;  covid test [facility] 7/25    CYSTOSCOPY WITH URETEROSCOPY, RETROGRADE PYELOGRAPHY, AND INSERTION OF STENT Bilateral 3/21/2020    Procedure: CYSTOSCOPY, WITH RETROGRADE PYELOGRAM,;  Surgeon: Leslie Balbuena MD;  Location: [facility] OR 1ST FLR;  Service: Urology;  Laterality: Bilateral;    DILATION OF NEPHROSTOMY TRACT Right 10/27/2022    Procedure: DILATION, NEPHROSTOMY TRACT;  Surgeon: Chirag Russ MD;  Location: [facility] OR 1ST FLR;  Service: Urology;  Laterality: Right;    ESOPHAGOGASTRODUODENOSCOPY  2014    ESOPHAGOGASTRODUODENOSCOPY  11/18/2020    ESOPHAGOGASTRODUODENOSCOPY N/A 11/18/2020    Procedure: ESOPHAGOGASTRODUODENOSCOPY (EGD);  Surgeon: Zenon Spencer MD;  Location: [facility];  Service: Endoscopy;  Laterality: N/A;    ESOPHAGOGASTRODUODENOSCOPY N/A 12/11/2020    Procedure: EGD (ESOPHAGOGASTRODUODENOSCOPY);  Surgeon: Juancho Muse MD;  Location: [facility] (2ND FLR);  Service: Endoscopy;  Laterality: N/A;    HYSTERECTOMY  2014    [facility] for cervical cancer    ILEOSTOMY  9/17/2020    Procedure: CREATION, ILEOSTOMY;  Surgeon: Hammad Reynolds MD;  Location: [facility] OR 2ND FLR;  Service: Colon and Rectal;;    LOOPOGRAM N/A 4/20/2021    Procedure: LOOPOGRAM;  Surgeon: Leslie Balbuena MD;  Location: [facility] OR 1ST FLR;  Service: Urology;  Laterality: N/A;    LOOPOGRAM N/A 6/6/2023    Procedure: LOOPOGRAM;  Surgeon: Leslie Balbuena MD;  Location: [facility] OR 1ST FLR;  Service: Urology;  Laterality: N/A;    MOBILIZATION OF SPLENIC FLEXURE N/A 9/11/2020    Procedure: MOBILIZATION, COLONIC;  Surgeon: Hammad Reynolds MD;  Location: [facility] OR 2ND FLR;  Service: Colon and Rectal;  Laterality: N/A;    NEPHROSTOGRAPHY Bilateral 4/17/2021    Procedure: Nephrostogram;  Surgeon: Celeste Surgeon;  Location: [facility];   Service: Anesthesiology;  Laterality: Bilateral;    OOPHORECTOMY      PERCUTANEOUS NEPHROLITHOTOMY Right 4/21/2023    Procedure: NEPHROLITHOTOMY, PERCUTANEOUS;  Surgeon: Chirag Russ MD;  Location: [facility] OR 2ND FLR;  Service: Urology;  Laterality: Right;  2.5 hrs    PERCUTANEOUS NEPHROSTOMY  4/21/2023    Procedure: CREATION, NEPHROSTOMY, PERCUTANEOUS with removal of existing nephrostomy tube;  Surgeon: Chirag Russ MD;  [facility];  Service: Urology;;    PYELOSCOPY Right 10/27/2022    Procedure: PYELOSCOPY;  Surgeon: Chirag Russ MD;  [facility];  Service: Urology;  Laterality: Right;    REPLACEMENT OF NEPHROSTOMY TUBE Right 10/27/2022    Procedure: REPLACEMENT, NEPHROSTOMY TUBE;  Surgeon: Chirag Russ MD;  Location: [facility] OR [address] [place] [address];  Service: Urology;  Laterality: Right;    RETROPERITONEAL LYMPHADENECTOMY Right 9/17/2018    Procedure: LYMPHADENECTOMY, RETROPERITONEUM;  Surgeon: Sebas Reed MD;  [facility];  Service: General;  Laterality: Right;  ILIAC    URETEROSCOPIC REMOVAL OF URETERIC CALCULUS Right 10/27/2022    Procedure: REMOVAL, CALCULUS, URETER, URETEROSCOPIC;  Surgeon: Chirag Russ MD;  Location: [facility] OR [address] [place] [address];  Service: Urology;  Laterality: Right;    URETEROSCOPY Right 10/27/2022    Procedure: URETEROSCOPY-ANTEGRADE;  Surgeon: Chirag Russ MD;  Location: [facility] OR [address] [place] [address];  Service: Urology;  Laterality: Right;     Family History   Problem Relation Age of Onset    Heart disease Sister     Heart disease Maternal Grandmother     Colon cancer Father     Esophageal cancer Father     Cancer Father         Lung-smoker    Cancer Mother         Cervical    Cervical cancer Mother     Breast cancer Maternal Aunt     Suicide Daughter         jumped from parking structure    Drug abuse Daughter     Drug abuse Daughter     Rectal cancer Neg Hx     Stomach cancer Neg Hx     Crohn's disease Neg Hx     Ulcerative colitis Neg Hx      Diabetes Neg Hx     Hypertension Neg Hx      Social History     Tobacco Use    Smoking status: Never    Smokeless tobacco: Never   Substance Use Topics    Alcohol use: No     Alcohol/week: 0.0 standard drinks of alcohol    Drug use: No     Review of Systems    Physical Exam     Initial Vitals [10/26/23 0127]   BP Pulse Resp Temp SpO2   (!) 108/58 88 17 98 °F (36.7 °C) 99 %      MAP       --         Physical Exam    Nursing note and vitals reviewed.  Constitutional: She appears well-developed and well-nourished. No distress.   HENT:   Head: Normocephalic and atraumatic.   Mouth/Throat: Oropharynx is clear and moist.   Eyes: Conjunctivae and EOM are normal. Pupils are equal, round, and reactive to light.   Neck: Neck supple. No tracheal deviation present.   Normal range of motion.  Cardiovascular:  Normal rate, regular rhythm and intact distal pulses.           No murmur heard.  Pulmonary/Chest: Breath sounds normal. No stridor. No respiratory distress. She has no wheezes. She has no rales.   Abdominal: Abdomen is soft. She exhibits no distension. There is no abdominal tenderness.   Musculoskeletal:         General: No edema. Normal range of motion.      Cervical back: Normal range of motion and neck supple.     Neurological: She is alert and oriented to person, place, and time. She has normal strength. No cranial nerve deficit.   Skin: Skin is warm and dry. Capillary refill takes less than 2 seconds.         ED Course   Procedures  Labs Reviewed   CBC W/ AUTO DIFFERENTIAL - Abnormal; Notable for the following components:       Result Value    Hemoglobin 11.2 (*)     Hematocrit 36.8 (*)     MCHC 30.4 (*)     RDW 15.7 (*)     All other components within normal limits   COMPREHENSIVE METABOLIC PANEL - Abnormal; Notable for the following components:    Chloride 111 (*)     CO2 19 (*)     Albumin 3.2 (*)     Alkaline Phosphatase 142 (*)     eGFR 43.1 (*)     All other components within normal limits          Imaging  Results    None          Medications   mirtazapine tablet 30 mg (has no administration in time range)   oxyCODONE immediate release tablet 5 mg (has no administration in time range)   sodium chloride 0.9% flush 10 mL (has no administration in time range)   naloxone 0.4 mg/mL injection 0.02 mg (has no administration in time range)   glucose chewable tablet 16 g (has no administration in time range)   glucose chewable tablet 24 g (has no administration in time range)   glucagon (human recombinant) injection 1 mg (has no administration in time range)   enoxaparin injection 40 mg (has no administration in time range)   acetaminophen tablet 650 mg (650 mg Oral Given 10/26/23 0607)   dextrose 10% bolus 125 mL 125 mL (has no administration in time range)   dextrose 10% bolus 250 mL 250 mL (has no administration in time range)   vancomycin - pharmacy to dose (has no administration in time range)   vancomycin (VANCOCIN) 1,000 mg in dextrose 5 % (D5W) 250 mL IVPB (Vial-Mate) (1,000 mg Intravenous Trough Due As Scheduled Before Dose 10/28/23 0500)   oxyCODONE immediate release tablet 5 mg (5 mg Oral Given 10/26/23 0504)   vancomycin 2 g in dextrose 5 % 500 mL IVPB (0 mg Intravenous Stopped 10/26/23 0815)   morphine injection 4 mg (4 mg Intravenous Given 10/26/23 0708)     Medical Decision Making  Pt presents to the ED after her nephrostomy tube fell out. Denies any trauma or injury. No acute distress at this time. Hemodynamically stable. Labs without significant leukocytosis or electrolyte abnormality. Spoke with IR who plan to evaluate pt upon admission. Pt admitted to hospital medicine for further management and evaluation     Amount and/or Complexity of Data Reviewed  Labs: ordered.    Risk  Prescription drug management.                               Clinical Impression:   Final diagnoses:  [N99.528] Nephrostomy complication        ED Disposition Condition    Observation                 Richmond Jerome Jr.,  MD  Resident  10/26/23 0912

## 2023-10-26 NOTE — SUBJECTIVE & OBJECTIVE
Past Medical History:   Diagnosis Date    Abnormal mammogram 08/25/2020    Acute blood loss anemia     Acute deep vein thrombosis (DVT) of lower extremity 12/09/2020    Advance care planning 04/30/2021    Anemia due to chronic blood loss     Anemia due to chronic kidney disease     Anxiety     Bilateral ureteral obstruction 9/11/2020    Cardiovascular event risk -- low 09/14/2015    ASCVD 10-year risk 1.9% (with optimal risk factors 1.3%) as of 9/14/2015     Cervical cancer 2014    Chronic back pain     Colostomy care     Deep vein thrombosis     Depression     Diarrhea due to malabsorption 11/14/2018    Difficult intubation     Disorder of kidney and ureter     DVT of lower extremity, bilateral 11/04/2020    Emphysema of lung 4/10/2023    Fibromyalgia     Fungemia 09/27/2020    Generalized abdominal pain 08/25/2020    GERD (gastroesophageal reflux disease)     Hemifacial spasm 09/16/2015    Hiatal hernia 2014    History of cervical cancer 10/11/2018    Hx of psychiatric care     Cymbalta, trazodone    Hypertension     Hypomagnesemia 11/21/2018    Lactose intolerance     Metastatic squamous cell carcinoma to lymph node 10/11/2018    Neuropathy due to chemotherapeutic drug 11/14/2018    Osteoarthritis of back     Peritonitis 09/22/2020    Pseudomonas urinary tract infection 04/21/2021    Psychiatric problem     Refusal of blood transfusions as patient is Oriental orthodox     Schatzki's ring 09/14/2015    Seen on outside EGD 05/2014, underwent esophageal dilatation. Bx were negative.     Seizures     Sleep stage dysfunction     Noted on PSG 06/2017; negative for obstructive sleep apnea     Stroke     Urinary tract infection associated with nephrostomy catheter 06/11/2020    Wound infection after surgery 09/24/2020       Past Surgical History:   Procedure Laterality Date    ANTEGRADE NEPHROSTOGRAPHY Bilateral 9/28/2020    Procedure: Nephrostogram - antegrade;  Surgeon: Leslie Balbuena MD;  Location: [facility] OR [address] [place] [address];   Service: Urology;  Laterality: Bilateral;    ANTEGRADE NEPHROSTOGRAPHY Left 4/20/2021    Procedure: NEPHROSTOGRAM, ANTEGRADE;  Surgeon: Leslie Balbuena MD;  Location: [facility] OR 1ST FLR;  Service: Urology;  Laterality: Left;    ANTEGRADE NEPHROSTOGRAPHY Right 10/27/2022    Procedure: NEPHROSTOGRAM, ANTEGRADE;  Surgeon: Chirag Russ MD;  Location: [facility] OR 1ST FLR;  Service: Urology;  Laterality: Right;    ANTEGRADE NEPHROSTOGRAPHY Right 4/21/2023    Procedure: NEPHROSTOGRAM, ANTEGRADE;  Surgeon: Chirag Russ MD;  Location: [facility] OR 2ND FLR;  Service: Urology;  Laterality: Right;    ANTEGRADE NEPHROSTOGRAPHY Left 6/6/2023    Procedure: Nephrostogram - antegrade;  Surgeon: Leslie Balbuena MD;  Location: [facility] OR 1ST FLR;  Service: Urology;  Laterality: Left;    BILATERAL OOPHORECTOMY  2015    CHOLECYSTECTOMY  11/09/2016    Done at [facility], path showed chronic cholecystitis and gallstones    cold knife conization  2014    COLECTOMY, RIGHT  9/17/2020    Procedure: COLECTOMY, RIGHT Extended;  Surgeon: Hamamd Reynolds MD;  Location: [facility] OR 2ND FLR;  Service: Colon and Rectal;;    COLONOSCOPY  2014    [facility]ONOSCOPY N/A 10/24/2016    [facility]    COLONOSCOPY N/A 5/18/2018    Procedure: COLONOSCOPY;  Surgeon: Arden Gutiérrez MD;  Location: [facility] (4TH FLR);  Service: Endoscopy;  Laterality: N/A;    COLONOSCOPY N/A 7/28/2020    Procedure: COLONOSCOPY;  Surgeon: Hammad Reynolds MD;  Location: [facility] ENDO (4TH FLR);  Service: Colon and Rectal;  Laterality: N/A;  covid test [facility] 7/25    CYSTOSCOPY WITH URETEROSCOPY, RETROGRADE PYELOGRAPHY, AND INSERTION OF STENT Bilateral 3/21/2020    Procedure: CYSTOSCOPY, WITH RETROGRADE PYELOGRAM,;  Surgeon: Leslie Balbuena MD;  Location: [facility] OR 1ST FLR;  Service: Urology;  Laterality: Bilateral;    DILATION OF NEPHROSTOMY TRACT Right 10/27/2022    Procedure: DILATION, NEPHROSTOMY TRACT;  Surgeon: Chirag Russ MD;  Location: [facility] OR 1ST FLR;  Service:  Urology;  Laterality: Right;    ESOPHAGOGASTRODUODENOSCOPY  2014    ESOPHAGOGASTRODUODENOSCOPY  11/18/2020    ESOPHAGOGASTRODUODENOSCOPY N/A 11/18/2020    Procedure: ESOPHAGOGASTRODUODENOSCOPY (EGD);  Surgeon: Zenon Spencer MD;  [facility]: [facility] ENDO;  Service: Endoscopy;  Laterality: N/A;    ESOPHAGOGASTRODUODENOSCOPY N/A 12/11/2020    Procedure: EGD (ESOPHAGOGASTRODUODENOSCOPY);  Surgeon: Juancho Muse MD;  Location: [facility] (2ND FLR);  Service: Endoscopy;  Laterality: N/A;    HYSTERECTOMY  2014    [facility] for cervical cancer    ILEOSTOMY  9/17/2020    Procedure: CREATION, ILEOSTOMY;  Surgeon: Hammad Reynolds MD;  Location: [facility] OR 2ND FLR;  Service: Colon and Rectal;;    LOOPOGRAM N/A 4/20/2021    Procedure: LOOPOGRAM;  Surgeon: Leslie Balbuena MD;  Location: [facility] OR 1ST FLR;  Service: Urology;  Laterality: N/A;    LOOPOGRAM N/A 6/6/2023    Procedure: LOOPOGRAM;  Surgeon: Leslie Balbuena MD;  Location: [facility] OR 1ST FLR;  Service: Urology;  Laterality: N/A;    MOBILIZATION OF SPLENIC FLEXURE N/A 9/11/2020    Procedure: MOBILIZATION, COLONIC;  Surgeon: Hammad Reynolds MD;  Location: [facility] OR 2ND FLR;  Service: Colon and Rectal;  Laterality: N/A;    NEPHROSTOGRAPHY Bilateral 4/17/2021    Procedure: Nephrostogram;  Surgeon: Celeste Surgeon;  Location: [facility];  Service: Anesthesiology;  Laterality: Bilateral;    OOPHORECTOMY      PERCUTANEOUS NEPHROLITHOTOMY Right 4/21/2023    Procedure: NEPHROLITHOTOMY, PERCUTANEOUS;  Surgeon: Chirag Russ MD;  Location: [facility] OR 2ND FLR;  Service: Urology;  Laterality: Right;  2.5 hrs    PERCUTANEOUS NEPHROSTOMY  4/21/2023    Procedure: CREATION, NEPHROSTOMY, PERCUTANEOUS with removal of existing nephrostomy tube;  Surgeon: Chirag Russ MD;  Location: [facility] OR 2ND FLR;  Service: Urology;;    PYELOSCOPY Right 10/27/2022    Procedure: PYELOSCOPY;  Surgeon: Chirag Russ MD;  Location: [facility] OR [address] [place] [address];  Service: Urology;  Laterality: Right;    REPLACEMENT OF  NEPHROSTOMY TUBE Right 10/27/2022    Procedure: REPLACEMENT, NEPHROSTOMY TUBE;  Surgeon: Chirag Russ MD;  Location: [facility] OR 1ST FLR;  Service: Urology;  Laterality: Right;    RETROPERITONEAL LYMPHADENECTOMY Right 9/17/2018    Procedure: LYMPHADENECTOMY, RETROPERITONEUM;  Surgeon: Sebas Reed MD;  Location: [facility] OR 2ND FLR;  Service: General;  Laterality: Right;  ILIAC    URETEROSCOPIC REMOVAL OF URETERIC CALCULUS Right 10/27/2022    Procedure: REMOVAL, CALCULUS, URETER, URETEROSCOPIC;  Surgeon: Chirag Russ MD;  Location: [facility] OR 1ST FLR;  Service: Urology;  Laterality: Right;    URETEROSCOPY Right 10/27/2022    Procedure: URETEROSCOPY-ANTEGRADE;  Surgeon: Chirag Russ MD;  Location: [facility] OR 1ST FLR;  Service: Urology;  Laterality: Right;       Review of patient's allergies indicates:   Allergen Reactions    Bee sting [allergen ext-venom-honey bee]      Rash      Grass pollen-bermuda, standard      rash       No current facility-administered medications on file prior to encounter.     Current Outpatient Medications on File Prior to Encounter   Medication Sig    gabapentin (NEURONTIN) 100 MG capsule Take 2 capsules (200 mg total) by mouth every evening.    loratadine (CLARITIN) 10 mg tablet Take 10 mg by mouth once daily.    mirtazapine (REMERON) 30 MG tablet Take 1 tablet (30 mg total) by mouth nightly.    oxyCODONE (ROXICODONE) 5 MG immediate release tablet Take 1 tablet (5 mg total) by mouth every 12 (twelve) hours as needed for Pain.    tolterodine (DETROL LA) 2 MG Cp24 Take 2 mg by mouth once daily.     Family History       Problem Relation (Age of Onset)    Breast cancer Maternal Aunt    Cancer Father, Mother    Cervical cancer Mother    Colon cancer Father    Drug abuse Daughter, Daughter    Esophageal cancer Father    Heart disease Sister, Maternal Grandmother    Suicide Daughter          Tobacco Use    Smoking status: Never    Smokeless tobacco: Never   Substance and Sexual Activity     Alcohol use: No     Alcohol/week: 0.0 standard drinks of alcohol    Drug use: No    Sexual activity: Yes     Partners: Male     Birth control/protection: None     Comment:  19 years since 1999     Review of Systems   Constitutional:  Positive for chills. Negative for appetite change, fever and unexpected weight change.   HENT:  Negative for sinus pressure, sinus pain and sore throat.    Eyes:  Negative for photophobia, pain and visual disturbance.   Respiratory:  Negative for cough, chest tightness, shortness of breath and wheezing.    Cardiovascular:  Negative for chest pain, palpitations and leg swelling.   Gastrointestinal:  Positive for abdominal pain. Negative for blood in stool, nausea and vomiting.   Genitourinary:         Patient has Left nephrostomy with normal output of clear to yellow urine prior to it being removed.   Musculoskeletal:  Positive for arthralgias.   Neurological:  Negative for light-headedness and headaches.   Psychiatric/Behavioral:  Negative for confusion and hallucinations.      Objective:     Vital Signs (Most Recent):  Temp: 98 °F (36.7 °C) (10/26/23 0127)  Pulse: 74 (10/26/23 0317)  Resp: 17 (10/26/23 0127)  BP: (!) 102/55 (10/26/23 0317)  SpO2: 98 % (10/26/23 0317) Vital Signs (24h Range):  Temp:  [98 °F (36.7 °C)] 98 °F (36.7 °C)  Pulse:  [74-88] 74  Resp:  [17] 17  SpO2:  [98 %-100 %] 98 %  BP: (100-119)/(52-58) 102/55     Weight: 76.2 kg (168 lb)  Body mass index is 24.81 kg/m².     Physical Exam           Significant Labs: All pertinent labs within the past 24 hours have been reviewed.    Significant Imaging: I have reviewed all pertinent imaging results/findings within the past 24 hours.

## 2023-10-26 NOTE — H&P
Please see IR consult note dated 10/26/2023    Susana Phillips PA-C  Interventional Radiology  Spectra [phone]  10/26/2023

## 2023-10-26 NOTE — ASSESSMENT & PLAN NOTE
Patient has a PMH of b/t ureteral sticture secondary to radiation for cervical cancer, b/t nephrostomy tubes ( right side tube has been removed, left was left in place), presenting due to her Left nephrostomy tube falling out. Patient reports that she was changing her colostomy bag when she noticed that her left nephrostomy tube had fallen out. Patient reports that nothing seemed to incite the tube falling out, she reports that it seemed to dislodge and come out on it's own. Patient denies fever, body aches, headaches, N/V/D, dizziness, chest pain, and SOB.  She does report flank pain near L nephrostomy site but no drainage. Patient reports she ws supposed to have two surgeries in November. One on 11/6/23 with CRS and the other on 11/28/23 with Urology. She reports normal ostomy output and no evidence of bleeding.     In the ED, patient was evaluated by urology and they reported no anticipated urologic intervention this admission.     PLAN:   - IR Consult placed in ED, plan for replacement upon admission. Appreciate recs   - pain control with prn meds   - Urology f/u upon discharge. Patient was schedule for urological procedure on 11/28/23

## 2023-10-26 NOTE — ASSESSMENT & PLAN NOTE
57 yo female with above comorbidities. Urology consulted for left nephrostomy tube falling out.    - Recommend consult to IR for left nephrostomy tube placement  - Recommend admission to hospital medicine  - Labs nonconcerning at this time  - No anticipated urologic intervention this admission

## 2023-10-26 NOTE — ASSESSMENT & PLAN NOTE
"Patient with acute kidney injury/acute renal failure likely due to post-obstructive d/t Ureteral strictures ODESSA is currently stable. Baseline creatinine 1.0 - Labs reviewed- Renal function/electrolytes with Estimated Creatinine Clearance: 49.5 mL/min (based on SCr of 1.4 mg/dL). according to latest data. Monitor urine output and serial BMP and adjust therapy as needed. Avoid nephrotoxins and renally dose meds for GFR listed above.       CT scan from 10/21/23 revealed "  Left-sided nephrostomy tube in place with mild prominence of the left renal collecting system as well as moderate to severe right-sided hydronephrosis, stable from prior examination."     PLAN:   - Nephrostomy tube placement with IR   - If creatinine does not improve consider imaging, retroperitoneal U/S  "

## 2023-10-26 NOTE — PROGRESS NOTES
"Pharmacokinetic Initial Assessment: IV Vancomycin    Assessment/Plan:    Initiate intravenous vancomycin with loading dose of 2000 mg once followed by a maintenance dose of vancomycin 1000 mg IV every 24 hours  Desired empiric serum trough concentration is 10 to 20 mcg/mL  Draw vancomycin trough level 60 min prior to third dose on 10/28/2023 at approximately 05:00  Pharmacy will continue to follow and monitor vancomycin.      Please contact pharmacy at extension [phone] with any questions regarding this assessment.     Thank you for the consult,   John Yorky       Patient brief summary:  Edita Riley is a 58 y.o. female initiated on antimicrobial therapy with IV Vancomycin for treatment of suspected urinary tract infection    Drug Allergies:   Review of patient's allergies indicates:   Allergen Reactions    Bee sting [allergen ext-venom-honey bee]      Rash      Grass pollen-bermuda, standard      rash       Actual Body Weight:   76.2 kg    Renal Function:   Estimated Creatinine Clearance: 44.7 mL/min (based on SCr of 1.4 mg/dL).,     Dialysis Method (if applicable):  N/A    CBC (last 72 hours):  Recent Labs   Lab Result Units 10/26/23  0305   WBC K/uL 8.36   Hemoglobin g/dL 11.2*   Hematocrit % 36.8*   Platelets K/uL 262   Gran % % 65.1   Lymph % % 18.1   Mono % % 12.2   Eosinophil % % 3.6   Basophil % % 0.6   Differential Method  Automated       Metabolic Panel (last 72 hours):  Recent Labs   Lab Result Units 10/26/23  0305   Sodium mmol/L 141   Potassium mmol/L 4.0   Chloride mmol/L 111*   CO2 mmol/L 19*   Glucose mg/dL 98   BUN mg/dL 19   Creatinine mg/dL 1.4   Albumin g/dL 3.2*   Total Bilirubin mg/dL 0.2   Alkaline Phosphatase U/L 142*   AST U/L 15   ALT U/L 13       Drug levels (last 3 results):  No results for input(s): "VANCOMYCINRA", "VANCORANDOM", "VANCOMYCINPE", "VANCOPEAK", "VANCOMYCINTR", "VANCOTROUGH" in the last 72 hours.    Microbiologic Results:  Microbiology Results (last 7 days)       " ** No results found for the last 168 hours. **

## 2023-10-26 NOTE — SUBJECTIVE & OBJECTIVE
Interval history: S/p nephrostomy tube replacement, NAEON, nauseous, no other complaints this morning, ambulating without issue    Past Medical History:   Diagnosis Date    Abnormal mammogram 08/25/2020    Acute blood loss anemia     Acute deep vein thrombosis (DVT) of lower extremity 12/09/2020    Advance care planning 04/30/2021    Anemia due to chronic blood loss     Anemia due to chronic kidney disease     Anxiety     Bilateral ureteral obstruction 9/11/2020    Cardiovascular event risk -- low 09/14/2015    ASCVD 10-year risk 1.9% (with optimal risk factors 1.3%) as of 9/14/2015     Cervical cancer 2014    Chronic back pain     Colostomy care     Deep vein thrombosis     Depression     Diarrhea due to malabsorption 11/14/2018    Difficult intubation     Disorder of kidney and ureter     DVT of lower extremity, bilateral 11/04/2020    Emphysema of lung 4/10/2023    Fibromyalgia     Fungemia 09/27/2020    Generalized abdominal pain 08/25/2020    GERD (gastroesophageal reflux disease)     Hemifacial spasm 09/16/2015    Hiatal hernia 2014    History of cervical cancer 10/11/2018    Hx of psychiatric care     Cymbalta, trazodone    Hypertension     Hypomagnesemia 11/21/2018    Lactose intolerance     Metastatic squamous cell carcinoma to lymph node 10/11/2018    Neuropathy due to chemotherapeutic drug 11/14/2018    Osteoarthritis of back     Peritonitis 09/22/2020    Pseudomonas urinary tract infection 04/21/2021    Psychiatric problem     Refusal of blood transfusions as patient is [employer]     Estephanieki's ring 09/14/2015    Seen on outside EGD 05/2014, underwent esophageal dilatation. Bx were negative.     Seizures     Sleep stage dysfunction     Noted on PSG 06/2017; negative for obstructive sleep apnea     Stroke     Urinary tract infection associated with nephrostomy catheter 06/11/2020    Wound infection after surgery 09/24/2020       Past Surgical History:   Procedure Laterality Date    ANTEGRADE  NEPHROSTOGRAPHY Bilateral 9/28/2020    Procedure: Nephrostogram - antegrade;  Surgeon: Leslie Balbuena MD;  Location: [facility] OR 1ST FLR;  Service: Urology;  Laterality: Bilateral;    ANTEGRADE NEPHROSTOGRAPHY Left 4/20/2021    Procedure: NEPHROSTOGRAM, ANTEGRADE;  Surgeon: Leslie Balbuena MD;  Location: [facility] OR 1ST FLR;  Service: Urology;  Laterality: Left;    ANTEGRADE NEPHROSTOGRAPHY Right 10/27/2022    Procedure: NEPHROSTOGRAM, ANTEGRADE;  Surgeon: Chirag Russ MD;  Location: [facility] OR 1ST FLR;  Service: Urology;  Laterality: Right;    ANTEGRADE NEPHROSTOGRAPHY Right 4/21/2023    Procedure: NEPHROSTOGRAM, ANTEGRADE;  Surgeon: Chirag Russ MD;  Location: [facility] OR 2ND FLR;  Service: Urology;  Laterality: Right;    ANTEGRADE NEPHROSTOGRAPHY Left 6/6/2023    Procedure: Nephrostogram - antegrade;  Surgeon: Leslie Balbuena MD;  Location: [facility] OR 1ST FLR;  Service: Urology;  Laterality: Left;    BILATERAL OOPHORECTOMY  2015    CHOLECYSTECTOMY  11/09/2016    Done at [facility], path showed chronic cholecystitis and gallstones    cold knife conization  2014    COLECTOMY, RIGHT  9/17/2020    Procedure: COLECTOMY, RIGHT Extended;  Surgeon: Hammad Reynolds MD;  Location: [facility] OR 2ND FLR;  Service: Colon and Rectal;;    COLONOSCOPY  2014    COLONOSCOPY N/A 10/24/2016    at [facility], Dr Thomas    COLONOSCOPY N/A 5/18/2018    Procedure: COLONOSCOPY;  Surgeon: Arden Gutiérrez MD;  Location: [facility] ENDO (4TH FLR);  Service: Endoscopy;  Laterality: N/A;    COLONOSCOPY N/A 7/28/2020    Procedure: COLONOSCOPY;  Surgeon: Hammad Reynolds MD;  Location: [facility] ENDO (4TH FLR);  Service: Colon and Rectal;  Laterality: N/A;  covid test [place] 7/25    CYSTOSCOPY WITH URETEROSCOPY, RETROGRADE PYELOGRAPHY, AND INSERTION OF STENT Bilateral 3/21/2020    Procedure: CYSTOSCOPY, WITH RETROGRADE PYELOGRAM,;  Surgeon: Leslie Balbuena MD;  Location: [facility] OR 1ST FLR;  Service: Urology;  Laterality: Bilateral;    DILATION OF NEPHROSTOMY TRACT  Right 10/27/2022    Procedure: DILATION, NEPHROSTOMY TRACT;  Surgeon: Chirag Russ MD;  Location: [facility] OR 1ST FLR;  Service: Urology;  Laterality: Right;    ESOPHAGOGASTRODUODENOSCOPY  2014    ESOPHAGOGASTRODUODENOSCOPY  11/18/2020    ESOPHAGOGASTRODUODENOSCOPY N/A 11/18/2020    Procedure: ESOPHAGOGASTRODUODENOSCOPY (EGD);  Surgeon: Zenon Spencer MD;  Location: [facility] ENDO;  Service: Endoscopy;  Laterality: N/A;    ESOPHAGOGASTRODUODENOSCOPY N/A 12/11/2020    Procedure: EGD (ESOPHAGOGASTRODUODENOSCOPY);  Surgeon: Juancho Muse MD;  Location: [facility] (2ND FLR);  Service: Endoscopy;  Laterality: N/A;    HYSTERECTOMY  2014    [facility] for cervical cancer    ILEOSTOMY  9/17/2020    Procedure: CREATION, ILEOSTOMY;  Surgeon: Hammad Reynolds MD;  Location: [facility] OR 2ND FLR;  Service: Colon and Rectal;;    LOOPOGRAM N/A 4/20/2021    Procedure: LOOPOGRAM;  Surgeon: Leslie Balbuena MD;  Location: [facility] OR 1ST FLR;  Service: Urology;  Laterality: N/A;    LOOPOGRAM N/A 6/6/2023    Procedure: LOOPOGRAM;  Surgeon: Leslie Balbuena MD;  Location: [facility] OR 1ST FLR;  Service: Urology;  Laterality: N/A;    MOBILIZATION OF SPLENIC FLEXURE N/A 9/11/2020    Procedure: MOBILIZATION, COLONIC;  Surgeon: Hammad Reynolds MD;  Location: [facility] OR 2ND FLR;  Service: Colon and Rectal;  Laterality: N/A;    NEPHROSTOGRAPHY Bilateral 4/17/2021    Procedure: Nephrostogram;  Surgeon: Celeste Surgeon;  Location: [facility];  Service: Anesthesiology;  Laterality: Bilateral;    OOPHORECTOMY      PERCUTANEOUS NEPHROLITHOTOMY Right 4/21/2023    Procedure: NEPHROLITHOTOMY, PERCUTANEOUS;  Surgeon: Chirag Russ MD;  Location: [facility] OR 2ND FLR;  Service: Urology;  Laterality: Right;  2.5 hrs    PERCUTANEOUS NEPHROSTOMY  4/21/2023    Procedure: CREATION, NEPHROSTOMY, PERCUTANEOUS with removal of existing nephrostomy tube;  Surgeon: Chirag Russ MD;  Location: [facility] OR [address] [place] [address];  Service: Urology;;    PYELOSCOPY Right 10/27/2022    Procedure:  PYELOSCOPY;  Surgeon: Chirag Russ MD;  Location: [facility] OR 1ST FLR;  Service: Urology;  Laterality: Right;    REPLACEMENT OF NEPHROSTOMY TUBE Right 10/27/2022    Procedure: REPLACEMENT, NEPHROSTOMY TUBE;  Surgeon: Chirag Russ MD;  Location: [facility] OR 1ST FLR;  Service: Urology;  Laterality: Right;    RETROPERITONEAL LYMPHADENECTOMY Right 9/17/2018    Procedure: LYMPHADENECTOMY, RETROPERITONEUM;  Surgeon: Sebas Reed MD;  Location: [facility] OR 2ND FLR;  Service: General;  Laterality: Right;  ILIAC    URETEROSCOPIC REMOVAL OF URETERIC CALCULUS Right 10/27/2022    Procedure: REMOVAL, CALCULUS, URETER, URETEROSCOPIC;  Surgeon: Chirag Russ MD;  Location: [facility] OR 1ST FLR;  Service: Urology;  Laterality: Right;    URETEROSCOPY Right 10/27/2022    Procedure: URETEROSCOPY-ANTEGRADE;  Surgeon: Chirag Russ MD;  [facility];  Service: Urology;  Laterality: Right;       Review of patient's allergies indicates:   Allergen Reactions    Bee sting [allergen ext-venom-honey bee]      Rash      Grass pollen-bermuda, standard      rash       No current facility-administered medications on file prior to encounter.     Current Outpatient Medications on File Prior to Encounter   Medication Sig    gabapentin (NEURONTIN) 100 MG capsule Take 2 capsules (200 mg total) by mouth every evening.    loratadine (CLARITIN) 10 mg tablet Take 10 mg by mouth once daily.    mirtazapine (REMERON) 30 MG tablet Take 1 tablet (30 mg total) by mouth nightly.    oxyCODONE (ROXICODONE) 5 MG immediate release tablet Take 1 tablet (5 mg total) by mouth every 12 (twelve) hours as needed for Pain.    tolterodine (DETROL LA) 2 MG Cp24 Take 2 mg by mouth once daily.     Family History       Problem Relation (Age of Onset)    Breast cancer Maternal Aunt    Cancer Father, Mother    Cervical cancer Mother    Colon cancer Father    Drug abuse Daughter, Daughter    Esophageal cancer Father    Heart disease Sister, Maternal  Grandmother    Suicide Daughter          Tobacco Use    Smoking status: Never    Smokeless tobacco: Never   Substance and Sexual Activity    Alcohol use: No     Alcohol/week: 0.0 standard drinks of alcohol    Drug use: No    Sexual activity: Yes     Partners: Male     Birth control/protection: None     Comment:  19 years since 1999     Review of Systems   Constitutional:  Positive for chills. Negative for activity change, appetite change, fatigue, fever and unexpected weight change.   HENT:  Negative for sinus pain and sore throat.    Respiratory:  Negative for cough, choking, chest tightness, shortness of breath, wheezing and stridor.    Cardiovascular:  Negative for chest pain, palpitations and leg swelling.   Gastrointestinal:  Positive for abdominal pain. Negative for nausea and vomiting.   Genitourinary:         Patient had left nephrostomy bag with clear to yellow urine prior to it being removed    Musculoskeletal:  Positive for arthralgias.   Neurological:  Negative for dizziness, light-headedness and headaches.   Psychiatric/Behavioral:  Negative for agitation, confusion and hallucinations.      Objective:     Vital Signs (Most Recent):  Temp: 97.2 °F (36.2 °C) (10/26/23 0754)  Pulse: 71 (10/26/23 0754)  Resp: 16 (10/26/23 0754)  BP: 100/63 (10/26/23 0754)  SpO2: 99 % (10/26/23 0754) Vital Signs (24h Range):  Temp:  [97.2 °F (36.2 °C)-98 °F (36.7 °C)] 97.2 °F (36.2 °C)  Pulse:  [71-88] 71  Resp:  [16-20] 16  SpO2:  [97 %-100 %] 99 %  BP: (100-121)/(52-66) 100/63     Weight: 84.3 kg (185 lb 13.6 oz)  Body mass index is 27.44 kg/m².     Physical Exam  Constitutional:       Appearance: Normal appearance. She is not ill-appearing.   HENT:      Head: Normocephalic and atraumatic.      Nose: Nose normal.      Mouth/Throat:      Mouth: Mucous membranes are moist.   Eyes:      Extraocular Movements: Extraocular movements intact.   Cardiovascular:      Rate and Rhythm: Normal rate and regular rhythm.       Pulses: Normal pulses.      Heart sounds: Normal heart sounds. No murmur heard.     No friction rub.   Pulmonary:      Effort: Pulmonary effort is normal.      Breath sounds: Normal breath sounds.   Abdominal:      Tenderness: There is no abdominal tenderness. There is no right CVA tenderness or left CVA tenderness.      Comments: Patient has colostomy bag with normal output and ileostomy bag with normal output. She also has a mid abdominal scar that seems to be well healed.    Musculoskeletal:         General: No swelling or tenderness.      Comments: Nephrostomy tube replaced, urine in bag, No drainage and no erythema noted on skin near tube, dressing CDI   Skin:     General: Skin is warm.      Capillary Refill: Capillary refill takes less than 2 seconds.   Neurological:      General: No focal deficit present.      Mental Status: She is alert and oriented to person, place, and time.   Psychiatric:         Mood and Affect: Affect is flat.                Significant Labs: All pertinent labs within the past 24 hours have been reviewed.    Significant Imaging: I have reviewed all pertinent imaging results/findings within the past 24 hours.

## 2023-10-26 NOTE — SUBJECTIVE & OBJECTIVE
Past Medical History:   Diagnosis Date    Abnormal mammogram 08/25/2020    Acute blood loss anemia     Acute deep vein thrombosis (DVT) of lower extremity 12/09/2020    Advance care planning 04/30/2021    Anemia due to chronic blood loss     Anemia due to chronic kidney disease     Anxiety     Bilateral ureteral obstruction 9/11/2020    Cardiovascular event risk -- low 09/14/2015    ASCVD 10-year risk 1.9% (with optimal risk factors 1.3%) as of 9/14/2015     Cervical cancer 2014    Chronic back pain     Colostomy care     Deep vein thrombosis     Depression     Diarrhea due to malabsorption 11/14/2018    Difficult intubation     Disorder of kidney and ureter     DVT of lower extremity, bilateral 11/04/2020    Emphysema of lung 4/10/2023    Fibromyalgia     Fungemia 09/27/2020    Generalized abdominal pain 08/25/2020    GERD (gastroesophageal reflux disease)     Hemifacial spasm 09/16/2015    Hiatal hernia 2014    History of cervical cancer 10/11/2018    Hx of psychiatric care     Cymbalta, trazodone    Hypertension     Hypomagnesemia 11/21/2018    Lactose intolerance     Metastatic squamous cell carcinoma to lymph node 10/11/2018    Neuropathy due to chemotherapeutic drug 11/14/2018    Osteoarthritis of back     Peritonitis 09/22/2020    Pseudomonas urinary tract infection 04/21/2021    Psychiatric problem     Refusal of blood transfusions as patient is Gnosticism     Schatzki's ring 09/14/2015    Seen on outside EGD 05/2014, underwent esophageal dilatation. Bx were negative.     Seizures     Sleep stage dysfunction     Noted on PSG 06/2017; negative for obstructive sleep apnea     Stroke     Urinary tract infection associated with nephrostomy catheter 06/11/2020    Wound infection after surgery 09/24/2020       Past Surgical History:   Procedure Laterality Date    ANTEGRADE NEPHROSTOGRAPHY Bilateral 9/28/2020    Procedure: Nephrostogram - antegrade;  Surgeon: Leslie Balbuena MD;  Location: [facility] OR [address] [place] [address];   Service: Urology;  Laterality: Bilateral;    ANTEGRADE NEPHROSTOGRAPHY Left 4/20/2021    Procedure: NEPHROSTOGRAM, ANTEGRADE;  Surgeon: Leslie Balbuena MD;  Location: [facility] OR 1ST FLR;  Service: Urology;  Laterality: Left;    ANTEGRADE NEPHROSTOGRAPHY Right 10/27/2022    Procedure: NEPHROSTOGRAM, ANTEGRADE;  Surgeon: Chirag Russ MD;  Location: [facility] OR 1ST FLR;  Service: Urology;  Laterality: Right;    ANTEGRADE NEPHROSTOGRAPHY Right 4/21/2023    Procedure: NEPHROSTOGRAM, ANTEGRADE;  Surgeon: Chirag Russ MD;  Location: [facility] OR 2ND FLR;  Service: Urology;  Laterality: Right;    ANTEGRADE NEPHROSTOGRAPHY Left 6/6/2023    Procedure: Nephrostogram - antegrade;  Surgeon: Leslie Balbuena MD;  Location: [facility] OR 1ST FLR;  Service: Urology;  Laterality: Left;    BILATERAL OOPHORECTOMY  2015    CHOLECYSTECTOMY  11/09/2016    Done at [facility], path showed chronic cholecystitis and gallstones    cold knife conization  2014    COLECTOMY, RIGHT  9/17/2020    Procedure: COLECTOMY, RIGHT Extended;  Surgeon: Hammad Reynolds MD;  Location: [facility] OR 2ND FLR;  Service: Colon and Rectal;;    COLONOSCOPY  2014    [facility]    COLONOSCOPY N/A 5/18/2018    Procedure: COLONOSCOPY;  Surgeon: Arden Gutiérrez MD;  Location: [facility] (4TH FLR);  Service: Endoscopy;  Laterality: N/A;    COLONOSCOPY N/A 7/28/2020    Procedure: COLONOSCOPY;  Surgeon: Hammad Reynolds MD;  Location: [facility] ENDO (4TH FLR);  Service: Colon and Rectal;  Laterality: N/A;  covid test [place] 7/25    CYSTOSCOPY WITH URETEROSCOPY, RETROGRADE PYELOGRAPHY, AND INSERTION OF STENT Bilateral 3/21/2020    Procedure: CYSTOSCOPY, WITH RETROGRADE PYELOGRAM,;  Surgeon: Leslie Balbuena MD;  Location: [facility] OR 1ST FLR;  Service: Urology;  Laterality: Bilateral;    DILATION OF NEPHROSTOMY TRACT Right 10/27/2022    Procedure: DILATION, NEPHROSTOMY TRACT;  Surgeon: Chirag Russ MD;  Location: [facility] OR 1ST FLR;  Service:  Urology;  Laterality: Right;    ESOPHAGOGASTRODUODENOSCOPY  2014    ESOPHAGOGASTRODUODENOSCOPY  11/18/2020    ESOPHAGOGASTRODUODENOSCOPY N/A 11/18/2020    Procedure: ESOPHAGOGASTRODUODENOSCOPY (EGD);  Surgeon: Zenon Spencer MD;  Location: [facility] ENDO;  Service: Endoscopy;  Laterality: N/A;    ESOPHAGOGASTRODUODENOSCOPY N/A 12/11/2020    Procedure: EGD (ESOPHAGOGASTRODUODENOSCOPY);  Surgeon: Juancho Muse MD;  Location: [facility] (2ND FLR);  Service: Endoscopy;  Laterality: N/A;    HYSTERECTOMY  2014    [facility] for cervical cancer    ILEOSTOMY  9/17/2020    Procedure: CREATION, ILEOSTOMY;  Surgeon: Hammad Reynolds MD;  Location: [facility] OR 2ND FLR;  Service: Colon and Rectal;;    LOOPOGRAM N/A 4/20/2021    Procedure: LOOPOGRAM;  Surgeon: Leslie Balbuena MD;  Location: [facility] OR 1ST FLR;  Service: Urology;  Laterality: N/A;    LOOPOGRAM N/A 6/6/2023    Procedure: LOOPOGRAM;  Surgeon: Leslie Balbuena MD;  Location: [facility] OR 1ST FLR;  Service: Urology;  Laterality: N/A;    MOBILIZATION OF SPLENIC FLEXURE N/A 9/11/2020    Procedure: MOBILIZATION, COLONIC;  Surgeon: Hammad Reynolds MD;  Location: [facility] OR 2ND FLR;  Service: Colon and Rectal;  Laterality: N/A;    NEPHROSTOGRAPHY Bilateral 4/17/2021    Procedure: Nephrostogram;  Surgeon: Celeste Surgeon;  Location: [facility];  Service: Anesthesiology;  Laterality: Bilateral;    OOPHORECTOMY      PERCUTANEOUS NEPHROLITHOTOMY Right 4/21/2023    Procedure: NEPHROLITHOTOMY, PERCUTANEOUS;  Surgeon: Chirag Russ MD;  Location: [facility] OR 2ND FLR;  Service: Urology;  Laterality: Right;  2.5 hrs    PERCUTANEOUS NEPHROSTOMY  4/21/2023    Procedure: CREATION, NEPHROSTOMY, PERCUTANEOUS with removal of existing nephrostomy tube;  Surgeon: Chirag Russ MD;  Location: [facility] OR 2ND FLR;  Service: Urology;;    PYELOSCOPY Right 10/27/2022    Procedure: PYELOSCOPY;  Surgeon: Chirag Russ MD;  Location: [facility] OR [address] [place] [address];  Service: Urology;  Laterality: Right;    REPLACEMENT OF  NEPHROSTOMY TUBE Right 10/27/2022    Procedure: REPLACEMENT, NEPHROSTOMY TUBE;  Surgeon: Chirag Russ MD;  Location: [facility] OR 1ST FLR;  Service: Urology;  Laterality: Right;    RETROPERITONEAL LYMPHADENECTOMY Right 9/17/2018    Procedure: LYMPHADENECTOMY, RETROPERITONEUM;  Surgeon: Sebas Reed MD;  Location: [facility] OR 2ND FLR;  Service: General;  Laterality: Right;  ILIAC    URETEROSCOPIC REMOVAL OF URETERIC CALCULUS Right 10/27/2022    Procedure: REMOVAL, CALCULUS, URETER, URETEROSCOPIC;  Surgeon: Chirag Russ MD;  Location: [facility] OR 1ST FLR;  Service: Urology;  Laterality: Right;    URETEROSCOPY Right 10/27/2022    Procedure: URETEROSCOPY-ANTEGRADE;  Surgeon: Chirag Russ MD;  Location: [facility] OR 1ST FLR;  Service: Urology;  Laterality: Right;       Review of patient's allergies indicates:   Allergen Reactions    Bee sting [allergen ext-venom-honey bee]      Rash      Grass pollen-bermuda, standard      rash       Family History       Problem Relation (Age of Onset)    Breast cancer Maternal Aunt    Cancer Father, Mother    Cervical cancer Mother    Colon cancer Father    Drug abuse Daughter, Daughter    Esophageal cancer Father    Heart disease Sister, Maternal Grandmother    Suicide Daughter            Tobacco Use    Smoking status: Never    Smokeless tobacco: Never   Substance and Sexual Activity    Alcohol use: No     Alcohol/week: 0.0 standard drinks of alcohol    Drug use: No    Sexual activity: Yes     Partners: Male     Birth control/protection: None     Comment:  19 years since 1999       Review of Systems   Constitutional:  Negative for activity change, chills, diaphoresis and fever.   HENT: Negative.     Eyes: Negative.    Respiratory: Negative.  Negative for shortness of breath.    Cardiovascular: Negative.  Negative for chest pain.   Gastrointestinal:  Negative for abdominal pain, constipation, diarrhea, nausea and vomiting.   Musculoskeletal: Negative.    Skin: Negative.     Neurological: Negative.    Psychiatric/Behavioral: Negative.         Objective:     Temp:  [98 °F (36.7 °C)] 98 °F (36.7 °C)  Pulse:  [74-88] 74  Resp:  [17] 17  SpO2:  [98 %-100 %] 98 %  BP: (100-119)/(52-58) 102/55  Weight: 76.2 kg (168 lb)  Body mass index is 24.81 kg/m².           Drains       Drain  Duration                  Urostomy 09/11/20 0000 ileal conduit LLQ 1140 days         Ileostomy 09/18/20 RLQ 1133 days         Nephrostomy 09/11/23 1011 Left 12 Fr. 44 days                     Physical Exam  Vitals and nursing note reviewed.   Constitutional:       General: She is not in acute distress.  HENT:      Head: Atraumatic.      Nose: Nose normal.   Eyes:      Extraocular Movements: Extraocular movements intact.   Cardiovascular:      Rate and Rhythm: Normal rate.   Pulmonary:      Effort: Pulmonary effort is normal.   Abdominal:      General: Abdomen is flat. There is no distension.      Tenderness: There is no abdominal tenderness. There is no right CVA tenderness or left CVA tenderness.      Comments: RLQ ostomy with liquid stool, pink and patent ostomy  LLQ ostomy with clear yellow urine, pink and patent ostomy  Left neph tube insertion site without erythema, nontender to palpation   Musculoskeletal:         General: Normal range of motion.      Cervical back: Normal range of motion.   Skin:     Coloration: Skin is not jaundiced.   Neurological:      General: No focal deficit present.      Mental Status: She is alert and oriented to person, place, and time.   Psychiatric:         Mood and Affect: Mood normal.         Behavior: Behavior normal.          Significant Labs:    BMP:  Recent Labs   Lab 10/21/23  1757 10/26/23  0305    141   K 4.0 4.0    111*   CO2 21* 19*   BUN 17 19   CREATININE 1.3 1.4   CALCIUM 9.4 9.3       CBC:  Recent Labs   Lab 10/21/23  1757 10/26/23  0305   WBC 9.30 8.36   HGB 11.7* 11.2*   HCT 38.4 36.8*    262       Blood Culture: No results for input(s):  ""LABBLOO" in the last 168 hours.  Urine Culture:   Recent Labs   Lab 10/21/23  2018   LABURIN ENTEROCOCCUS FAECALIS  >100,000 cfu/ml  No other significant isolate  *     Urine Studies:   Recent Labs   Lab 10/21/23  2018   COLORU Yellow   APPEARANCEUA Hazy*   PHUR 6.0   SPECGRAV 1.020   PROTEINUA 1+*   GLUCUA Negative   KETONESU Negative   BILIRUBINUA Negative   OCCULTUA 2+*   NITRITE Negative   LEUKOCYTESUR 3+*   RBCUA >100*   WBCUA >100*   BACTERIA Few*   HYALINECASTS 3*       Significant Imaging:  None for review at this time.    "

## 2023-10-26 NOTE — PLAN OF CARE
[facility] - Med Surg  Initial Discharge Assessment       Primary Care Provider: Trina Vu MD    Admission Diagnosis: Chest pain [R07.9]  Nephrostomy complication [N99.528]    Admission Date: 10/26/2023  Expected Discharge Date: 10/27/2023    Transition of Care Barriers: (P) None    Payor: MEDICAID / [facility] / Product Type: Managed Medicaid /     Extended Emergency Contact Information  Primary Emergency Contact: Hammad Riley  Address: [address] [address] [facility]  Home Phone: [phone]  Work Phone: [phone]  Mobile Phone: [phone]  Relation: Spouse  Secondary Emergency Contact: Federico Riley  Mobile Phone: [phone]  Relation: Daughter    Discharge Plan A: (P) Home with family  Discharge Plan B: (P) Home      [id number] - [place] - [address]  [address]  [place] [address]  Phone: [phone] Fax: [phone]    [facility]  [address]  [facility] [address]  Phone: [phone] Fax: [phone]      CM met with patient to discuss discharge planning. Patient lives home with spouse in an apartment building with four stairs to enter. Prior to hospital admission, patient was independent with ADLs occasionally requiring a walker. Will continue to follow for post acute needs. Discharge Plan A and Plan B have been determined by review of patient's clinical status, future medical and therapeutic needs, and coverage/benefits for post-acute care in coordination with multidisciplinary team members.    Initial Assessment (most recent)       Adult Discharge Assessment - 10/26/23 1530          Discharge Assessment    Assessment Type Discharge Planning Assessment (P)      Confirmed/corrected address, phone number and insurance Yes (P)      Confirmed Demographics Correct on Facesheet (P)      Source of Information patient (P)      People in Home  significant other (P)      Do you expect to return to your current living situation? Yes (P)      Do you have help at home or someone to help you manage your care at home? Yes (P)      Who are your caregiver(s) and their phone number(s)? Hammad Riley (spouse) [phone] (P)      Prior to hospitilization cognitive status: Alert/Oriented (P)      Current cognitive status: Alert/Oriented (P)      Walking or Climbing Stairs -- (P)    IND    Dressing/Bathing -- (P)    IND    Home Accessibility stairs to enter home (P)      Number of Stairs, Main Entrance four (P)      Home Layout Able to live on 1st floor (P)      Equipment Currently Used at Home walker, standard (P)      Readmission within 30 days? No (P)      Patient currently being followed by outpatient case management? No (P)      Do you currently have service(s) that help you manage your care at home? No (P)      Do you take prescription medications? Yes (P)      Do you have prescription coverage? Yes (P)      Coverage Medicaid (P)      Do you have any problems affording any of your prescribed medications? No (P)      Is the patient taking medications as prescribed? yes (P)      Who is going to help you get home at discharge? Hammad Riley (spouse) [phone] (P)      How do you get to doctors appointments? family or friend will provide (P)      Are you on dialysis? No (P)      Do you take coumadin? No (P)      DME Needed Upon Discharge  none (P)      Discharge Plan discussed with: Patient (P)      Transition of Care Barriers None (P)      Discharge Plan A Home with family (P)      Discharge Plan B Home (P)         Physical Activity    On average, how many days per week do you engage in moderate to strenuous exercise (like a brisk walk)? 0 days (P)      On average, how many minutes do you engage in exercise at this level? 0 min (P)         Financial Resource Strain    How hard is it for you to pay for the very basics like food, housing, medical care,  and heating? Not hard at all (P)         Housing Stability    In the last 12 months, was there a time when you were not able to pay the mortgage or rent on time? No (P)      In the last 12 months, how many places have you lived? 1 (P)      In the last 12 months, was there a time when you did not have a steady place to sleep or slept in a shelter (including now)? No (P)         Transportation Needs    In the past 12 months, has lack of transportation kept you from medical appointments or from getting medications? No (P)      In the past 12 months, has lack of transportation kept you from meetings, work, or from getting things needed for daily living? No (P)         Food Insecurity    Within the past 12 months, you worried that your food would run out before you got the money to buy more. Never true (P)      Within the past 12 months, the food you bought just didn't last and you didn't have money to get more. Never true (P)         Stress    Do you feel stress - tense, restless, nervous, or anxious, or unable to sleep at night because your mind is troubled all the time - these days? Not at all (P)         Social Connections    In a typical week, how many times do you talk on the phone with family, friends, or neighbors? More than three times a week (P)      How often do you get together with friends or relatives? Once a week (P)      How often do you attend Alevism or Mu-ism services? More than 4 times per year (P)      Do you belong to any clubs or organizations such as Alevism groups, unions, fraternal or athletic groups, or school groups? No (P)      How often do you attend meetings of the clubs or organizations you belong to? Never (P)      Are you , , , , never , or living with a partner?  (P)         OTHER    Name(s) of People in Home Hammad Riley (spouse) [phone] (P)                         JULIET Penny  Case Management  [phone]  [phone]

## 2023-10-26 NOTE — ASSESSMENT & PLAN NOTE
Patient reports hx of HTN. In the ED, pressures are soft. She reports she is not on any anti-HTN medication at home     PLAN:   - Continue to monitor patient BP, consider starting prn anti-hypertensive if BP elevated

## 2023-10-26 NOTE — NURSING
Pt left the floor to IR for nephrostomy tube placement . Pt left on her bed with transporter. Pt is A,A,O x 4 . Stable V/S.

## 2023-10-26 NOTE — ASSESSMENT & PLAN NOTE
Patient admits to abdominal pain that has been ongoing these past few days which she recently went to the ED for on 10/21/23. Patient reports that she was found to have a UTI, micro shows E. Faecalis. She reports that she received a call about initiating IV Abx but has not heard back in the past few days. She reports flank pain near L nephrostomy site as well.     PLAN:   - Based on susceptibilities, starting treatment with Vancomycin

## 2023-10-26 NOTE — ASSESSMENT & PLAN NOTE
Patient has Hgb of 11.2 upon admission. Hgb is chronically low. Patient is having no active bleeding. No evidence in ostomy bags either.     PLAN:  - Continue to monitor CBC   - Transfuse for Hgb < 7   - Monitor for signs of active bleeding from ostomy bag

## 2023-10-26 NOTE — NURSING
Nurses Note -- 4 Eyes      10/26/2023   5:25 AM      Skin assessed during: Admit      [] No Altered Skin Integrity Present    []Prevention Measures Documented      [x] Yes- Altered Skin Integrity Present or Discovered   [] LDA Added if Not in Epic (Describe Wound)   [] New Altered Skin Integrity was Present on Admit and Documented in LDA   [] Wound Image Taken    Wound Care Consulted? No    Attending Nurse:  Erin Umbehr, RN     Second RN/Staff Member:  AMY Strickland    Pt has nephrostomy tube site.  No pressure injuries noted.

## 2023-10-26 NOTE — NURSING TRANSFER
Nursing Transfer Note      10/26/2023   5:21 AM    Nurse giving handoff:  Nurse receiving handoff:Anel    Reason patient is being transferred: Admit from ED    Transfer From: ED    Transfer via wheelchair    Transfer with     Transported by Transport    Transfer Vital Signs:  Blood Pressure: SEE flow sheet  Heart Rate:  O2:  Temperature:  Respirations:    Telemetry:   Order for Tele Monitor? No    Additional Lines:     4eyes on Skin: yes    Medicines sent: None    Any special needs or follow-up needed: None    Patient belongings transferred with patient: Yes    Chart send with patient: Yes    Notified: Pt to notified family    Patient reassessed at: 10/26/2023 @ 0520 (date, time)  1  Upon arrival to floor: patient oriented to room, call bell in reach, and bed in lowest position

## 2023-10-27 LAB
ALBUMIN SERPL BCP-MCNC: 2.6 G/DL (ref 3.5–5.2)
ALP SERPL-CCNC: 115 U/L (ref 55–135)
ALT SERPL W/O P-5'-P-CCNC: 20 U/L (ref 10–44)
ANION GAP SERPL CALC-SCNC: 10 MMOL/L (ref 8–16)
ANION GAP SERPL CALC-SCNC: 9 MMOL/L (ref 8–16)
AST SERPL-CCNC: 24 U/L (ref 10–40)
BASOPHILS # BLD AUTO: 0.03 K/UL (ref 0–0.2)
BASOPHILS NFR BLD: 0.3 % (ref 0–1.9)
BILIRUB SERPL-MCNC: 0.5 MG/DL (ref 0.1–1)
BUN SERPL-MCNC: 13 MG/DL (ref 6–20)
BUN SERPL-MCNC: 15 MG/DL (ref 6–20)
CALCIUM SERPL-MCNC: 8.8 MG/DL (ref 8.7–10.5)
CALCIUM SERPL-MCNC: 9.5 MG/DL (ref 8.7–10.5)
CHLORIDE SERPL-SCNC: 110 MMOL/L (ref 95–110)
CHLORIDE SERPL-SCNC: 112 MMOL/L (ref 95–110)
CO2 SERPL-SCNC: 17 MMOL/L (ref 23–29)
CO2 SERPL-SCNC: 21 MMOL/L (ref 23–29)
CREAT SERPL-MCNC: 1.5 MG/DL (ref 0.5–1.4)
CREAT SERPL-MCNC: 1.5 MG/DL (ref 0.5–1.4)
DIFFERENTIAL METHOD: ABNORMAL
EOSINOPHIL # BLD AUTO: 0.3 K/UL (ref 0–0.5)
EOSINOPHIL NFR BLD: 2.8 % (ref 0–8)
ERYTHROCYTE [DISTWIDTH] IN BLOOD BY AUTOMATED COUNT: 15.7 % (ref 11.5–14.5)
EST. GFR  (NO RACE VARIABLE): 39.6 ML/MIN/1.73 M^2
EST. GFR  (NO RACE VARIABLE): 39.6 ML/MIN/1.73 M^2
GLUCOSE SERPL-MCNC: 94 MG/DL (ref 70–110)
GLUCOSE SERPL-MCNC: 99 MG/DL (ref 70–110)
HCT VFR BLD AUTO: 36.5 % (ref 37–48.5)
HGB BLD-MCNC: 10.8 G/DL (ref 12–16)
IMM GRANULOCYTES # BLD AUTO: 0.02 K/UL (ref 0–0.04)
IMM GRANULOCYTES NFR BLD AUTO: 0.2 % (ref 0–0.5)
LYMPHOCYTES # BLD AUTO: 1.5 K/UL (ref 1–4.8)
LYMPHOCYTES NFR BLD: 14.2 % (ref 18–48)
MAGNESIUM SERPL-MCNC: 1.9 MG/DL (ref 1.6–2.6)
MCH RBC QN AUTO: 27.5 PG (ref 27–31)
MCHC RBC AUTO-ENTMCNC: 29.6 G/DL (ref 32–36)
MCV RBC AUTO: 93 FL (ref 82–98)
MONOCYTES # BLD AUTO: 1.2 K/UL (ref 0.3–1)
MONOCYTES NFR BLD: 11.4 % (ref 4–15)
NEUTROPHILS # BLD AUTO: 7.3 K/UL (ref 1.8–7.7)
NEUTROPHILS NFR BLD: 71.1 % (ref 38–73)
NRBC BLD-RTO: 0 /100 WBC
PHOSPHATE SERPL-MCNC: 3.8 MG/DL (ref 2.7–4.5)
PLATELET # BLD AUTO: 181 K/UL (ref 150–450)
PMV BLD AUTO: 11.2 FL (ref 9.2–12.9)
POTASSIUM SERPL-SCNC: 3.9 MMOL/L (ref 3.5–5.1)
POTASSIUM SERPL-SCNC: 3.9 MMOL/L (ref 3.5–5.1)
PROT SERPL-MCNC: 6.6 G/DL (ref 6–8.4)
RBC # BLD AUTO: 3.93 M/UL (ref 4–5.4)
SODIUM SERPL-SCNC: 139 MMOL/L (ref 136–145)
SODIUM SERPL-SCNC: 140 MMOL/L (ref 136–145)
WBC # BLD AUTO: 10.25 K/UL (ref 3.9–12.7)

## 2023-10-27 PROCEDURE — 96372 THER/PROPH/DIAG INJ SC/IM: CPT

## 2023-10-27 PROCEDURE — 36415 COLL VENOUS BLD VENIPUNCTURE: CPT

## 2023-10-27 PROCEDURE — 25000003 PHARM REV CODE 250

## 2023-10-27 PROCEDURE — 80048 BASIC METABOLIC PNL TOTAL CA: CPT | Mod: XB

## 2023-10-27 PROCEDURE — 63600175 PHARM REV CODE 636 W HCPCS

## 2023-10-27 PROCEDURE — C1751 CATH, INF, PER/CENT/MIDLINE: HCPCS

## 2023-10-27 PROCEDURE — 11000001 HC ACUTE MED/SURG PRIVATE ROOM

## 2023-10-27 PROCEDURE — 83735 ASSAY OF MAGNESIUM: CPT

## 2023-10-27 PROCEDURE — 84100 ASSAY OF PHOSPHORUS: CPT

## 2023-10-27 PROCEDURE — 85025 COMPLETE CBC W/AUTO DIFF WBC: CPT

## 2023-10-27 PROCEDURE — 36410 VNPNXR 3YR/> PHY/QHP DX/THER: CPT

## 2023-10-27 PROCEDURE — A4216 STERILE WATER/SALINE, 10 ML: HCPCS | Performed by: HOSPITALIST

## 2023-10-27 PROCEDURE — 25000003 PHARM REV CODE 250: Performed by: HOSPITALIST

## 2023-10-27 PROCEDURE — 76937 US GUIDE VASCULAR ACCESS: CPT

## 2023-10-27 PROCEDURE — 80053 COMPREHEN METABOLIC PANEL: CPT

## 2023-10-27 RX ORDER — SODIUM CHLORIDE 0.9 % (FLUSH) 0.9 %
10 SYRINGE (ML) INJECTION
Status: DISCONTINUED | OUTPATIENT
Start: 2023-10-27 | End: 2023-10-29 | Stop reason: HOSPADM

## 2023-10-27 RX ORDER — AMOXICILLIN 875 MG/1
875 TABLET, FILM COATED ORAL EVERY 12 HOURS
Qty: 26 TABLET | Refills: 0 | Status: SHIPPED | OUTPATIENT
Start: 2023-10-27 | End: 2023-10-29 | Stop reason: SDUPTHER

## 2023-10-27 RX ORDER — SODIUM CHLORIDE 0.9 % (FLUSH) 0.9 %
10 SYRINGE (ML) INJECTION EVERY 6 HOURS
Status: DISCONTINUED | OUTPATIENT
Start: 2023-10-27 | End: 2023-10-29 | Stop reason: HOSPADM

## 2023-10-27 RX ADMIN — AMPICILLIN 2 G: 2 INJECTION, POWDER, FOR SOLUTION INTRAMUSCULAR; INTRAVENOUS at 04:10

## 2023-10-27 RX ADMIN — AMPICILLIN 2 G: 2 INJECTION, POWDER, FOR SOLUTION INTRAMUSCULAR; INTRAVENOUS at 12:10

## 2023-10-27 RX ADMIN — MIRTAZAPINE 30 MG: 30 TABLET, FILM COATED ORAL at 08:10

## 2023-10-27 RX ADMIN — SODIUM CHLORIDE, POTASSIUM CHLORIDE, SODIUM LACTATE AND CALCIUM CHLORIDE 1000 ML: 600; 310; 30; 20 INJECTION, SOLUTION INTRAVENOUS at 10:10

## 2023-10-27 RX ADMIN — ENOXAPARIN SODIUM 40 MG: 40 INJECTION SUBCUTANEOUS at 04:10

## 2023-10-27 RX ADMIN — Medication 10 ML: at 06:10

## 2023-10-27 RX ADMIN — AMPICILLIN 2 G: 2 INJECTION, POWDER, FOR SOLUTION INTRAMUSCULAR; INTRAVENOUS at 11:10

## 2023-10-27 NOTE — CONSULTS
MALAIKA consulted for Midline insertion in real time using u/s guidance.     Indication: IV ABX: AMPICILLIN X7 DAYS  Gauge: 20  Location: LEFT CEPHALIC  Length in cm: 10  Max dwell date: 11/25/2023  Lot #: [id number]    Image saved and uploaded to EMR

## 2023-10-27 NOTE — CONSULTS
Ralph Ortiz - [facility] Surg  Wound Care    Patient Name:  Edita Riley   MRN:  [id number]  Date: 10/27/2023  Diagnosis: Nephrostomy status    History:     Past Medical History:   Diagnosis Date    Abnormal mammogram 08/25/2020    Acute blood loss anemia     Acute deep vein thrombosis (DVT) of lower extremity 12/09/2020    Advance care planning 04/30/2021    Anemia due to chronic blood loss     Anemia due to chronic kidney disease     Anxiety     Bilateral ureteral obstruction 9/11/2020    Cardiovascular event risk -- low 09/14/2015    ASCVD 10-year risk 1.9% (with optimal risk factors 1.3%) as of 9/14/2015     Cervical cancer 2014    Chronic back pain     Colostomy care     Deep vein thrombosis     Depression     Diarrhea due to malabsorption 11/14/2018    Difficult intubation     Disorder of kidney and ureter     DVT of lower extremity, bilateral 11/04/2020    Emphysema of lung 4/10/2023    Fibromyalgia     Fungemia 09/27/2020    Generalized abdominal pain 08/25/2020    GERD (gastroesophageal reflux disease)     Hemifacial spasm 09/16/2015    Hiatal hernia 2014    History of cervical cancer 10/11/2018    Hx of psychiatric care     Cymbalta, trazodone    Hypertension     Hypomagnesemia 11/21/2018    Lactose intolerance     Metastatic squamous cell carcinoma to lymph node 10/11/2018    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 Bx were negative.     Seizures     Sleep stage dysfunction     Noted on PSG 06/2017; negative for obstructive sleep apnea     Stroke     Urinary tract infection associated with nephrostomy catheter 06/11/2020    Wound infection after surgery 09/24/2020       Social History     Socioeconomic History     Marital status:      Spouse name: Hammad    Number of children: 2   Tobacco Use    Smoking status: Never    Smokeless tobacco: Never   Substance and Sexual Activity    Alcohol use: No     Alcohol/week: 0.0 standard drinks of alcohol    Drug use: No    Sexual activity: Yes     Partners: Male     Birth control/protection: None     Comment:  19 years since    Social History Narrative    , twin daughters (1  2018), disabled due to childhood stroke, [employer] sophia     Social Determinants of Health     Financial Resource Strain: Low Risk  (10/26/2023)    Overall Financial Resource Strain (CARDIA)     Difficulty of Paying Living Expenses: Not hard at all   Recent Concern: Financial Resource Strain - Medium Risk (2023)    Overall Financial Resource Strain (CARDIA)     Difficulty of Paying Living Expenses: Somewhat hard   Food Insecurity: No Food Insecurity (10/26/2023)    Hunger Vital Sign     Worried About Running Out of Food in the Last Year: Never true     Ran Out of Food in the Last Year: Never true   Transportation Needs: No Transportation Needs (10/26/2023)    PRAPARE - Transportation     Lack of Transportation (Medical): No     Lack of Transportation (Non-Medical): No   Physical Activity: Inactive (10/26/2023)    Exercise Vital Sign     Days of Exercise per Week: 0 days     Minutes of Exercise per Session: 0 min   Stress: No Stress Concern Present (10/26/2023)    [employer] - Occupational Stress Questionnaire     Feeling of Stress : Not at all   Recent Concern: Stress - Stress Concern Present (2023)    [employer] - Occupational Stress Questionnaire     Feeling of Stress : To some extent   Social Connections: Moderately Integrated (10/26/2023)    Social Connection and Isolation Panel [NHANES]     Frequency of Communication with Friends and Family: More than three times a week     Frequency of Social  Gatherings with Friends and Family: Once a week     Attends Muslim Services: More than 4 times per year     Active Member of Clubs or Organizations: No     Attends Club or Organization Meetings: Never     Marital Status:    Recent Concern: Social Connections - Moderately Isolated (9/11/2023)    Social Connection and Isolation Panel [NHANES]     Frequency of Communication with Friends and Family: Three times a week     Frequency of Social Gatherings with Friends and Family: Three times a week     Attends Muslim Services: Never     Active Member of Clubs or Organizations: No     Attends Club or Organization Meetings: Never     Marital Status:    Housing Stability: Low Risk  (10/26/2023)    Housing Stability Vital Sign     Unable to Pay for Housing in the Last Year: No     Number of Places Lived in the Last Year: 1     Unstable Housing in the Last Year: No       Precautions:     Allergies as of 10/26/2023 - Reviewed 10/26/2023   Allergen Reaction Noted    Bee sting [allergen ext-venom-honey bee]  05/04/2015    Grass pollen-bermuda, standard  05/04/2015       WOC Assessment Details/Treatment   Patient seen for wound care consultation.   Reviewed chart for this encounter.   See Flow Sheet for findings.    Pt in bed and agreeable to care. Urostomy pouch removed, peristomal skin cleansed with water, no sting skin barrier applied and a hard convex coloplast urostomy pouch applied to site without difficulty. Urostomy pouch then connected to a drainage bag. Ileostomy pouch removed, peristomal skin cleansed with water, no sting skin barrier applied and a flat vale pouch applied to site without difficulty. Supplies left at the bedside.     RECOMMENDATIONS:  - Ostomies: bedside nursing to empty when pouch is 1/2 full; bedside nursing to change 2x/week  - Turning every 2 hours  - Heel protecting boots  - Waffle mattress overlay   - Nursing to maintain pressure injury prevention interventions         10/27/23 1328        Ileostomy 09/18/20 RLQ   Placement Date: 09/18/20   Inserted by: MD  Location: RLQ   Stoma Appearance pink;oval;moist   Stoma Size (in)   (25mm)   Appliance 1-piece;changed;leakage   Accessories/Skin Care barrier substance over peristomal skin;cleansed w/ water   Treatment Bag change   Stoma Function thin liquid;yellow   Peristomal Assessment Intact without breakdown   Tolerance no signs/symptoms of discomfort;did not assist w/ appliance change;did not assist w/ stoma care        Urostomy 09/11/20 0000 ileal conduit LLQ   Date of First Assessment/Time of First Assessment: 09/11/20 0000   Present Prior to Hospital Arrival?: Yes  Inserted by: MD  Urostomy Type: ileal conduit  Location: LLQ   Stoma Appearance moist;pink;oval   Stoma Size (in)   (28mm)   Stomal Appliance 1 piece;Leakage;Changed   Accessories/Skin Care barrier substance over peristomal skin;cleansed w/ water   Stoma Function yellow urine   Peristomal Skin dry   Tolerance no signs/symptoms of discomfort;did not assist with appliance change;did not assist with stoma care   Output (mL) 200 mL       Recommendations made to primary team for above plan via secure chat. Orders placed. Wound care will follow-up as needed.     10/27/2023

## 2023-10-27 NOTE — PLAN OF CARE
[facility]     Patient is going home on PO antibiotics.  Will signoff. Thank you for referring.       Inna Welsh R.N.   Clinical Service Liaison   [facility]   Cell: [phone]  Office: [phone]   Fax: [phone]

## 2023-10-27 NOTE — ASSESSMENT & PLAN NOTE
Patient admits to abdominal pain that has been ongoing these past few days which she recently went to the ED for on 10/21/23. Patient reports that she was found to have a UTI, micro shows E. Faecalis. She reports that she received a call about initiating IV Abx but has not heard back in the past few days. She reports flank pain near L nephrostomy site as well.     PLAN:   - Based on susceptibilities, starting treatment with Vancomycin transitioned to ampicillin. Will transition to amoxicillin BID at discharge to complete 14 day course

## 2023-10-27 NOTE — PLAN OF CARE
10/27/23 0952   Post-Acute Status   Post-Acute Authorization IV Infusion  ([facility])   IV Infusion Status Referral(s) sent   Discharge Delays None known at this time   Discharge Plan   Discharge Plan A Home with family   Discharge Plan B Home     Met with patient  to review discharge recommendation of outpatient IV infusion  and patient is agreeable to plan    Patient/family provided list of facilities in-network with patient's payor plan. Providers that are owned, operated, or affiliated with [facility] are included on the list.     Notified that referral sent to below listed facilities from in-network list based on proximity to home/family support:   [facility]    If an additional preferred facility not listed above is identified, additional referral to be sent. If above facilities unable to accept, will send additional referrals to in-network providers. Discharge Plan A and Plan B have been determined by review of patient's clinical status, future medical and therapeutic needs, and coverage/benefits for post-acute care in coordination with multidisciplinary team members.      JULIET Penny  Case Management  [phone]

## 2023-10-27 NOTE — PLAN OF CARE
Problem: Adult Inpatient Plan of Care  Goal: Plan of Care Review  Outcome: Ongoing, Progressing  Goal: Absence of Hospital-Acquired Illness or Injury  Outcome: Ongoing, Progressing     Problem: Adult Inpatient Plan of Care  Goal: Patient-Specific Goal (Individualized)  Outcome: Ongoing, Not Progressing  Goal: Optimal Comfort and Wellbeing  Outcome: Ongoing, Not Progressing  Goal: Readiness for Transition of Care  Outcome: Ongoing, Not Progressing     Problem: Fluid and Electrolyte Imbalance (Acute Kidney Injury/Impairment)  Goal: Fluid and Electrolyte Balance  Outcome: Ongoing, Not Progressing     Problem: Oral Intake Inadequate (Acute Kidney Injury/Impairment)  Goal: Optimal Nutrition Intake  Outcome: Ongoing, Not Progressing     Problem: Renal Function Impairment (Acute Kidney Injury/Impairment)  Goal: Effective Renal Function  Outcome: Ongoing, Not Progressing   Pt is A,A,O x 4 . Stable V/S. Pt C/O pain 8-10/10 in her Lt posterior abd at the site of the nephrostomy tube , pt received PRN pain med as ordered. Pt turned in bed independently. Pt remained free of falls and injuries per day. Pt went to IR today and new Lt nephrostomy tube placed. Ileostomy bag was leaking today and bag was changed 3 times today. Also the urostomy bag was leaking today today and the bag changed once. Pt started on ampicillin IV today. Wound care consulted for stomas site and skin around the stomas.

## 2023-10-27 NOTE — PLAN OF CARE
Problem: Adult Inpatient Plan of Care  Goal: Plan of Care Review  Outcome: Ongoing, Progressing  Goal: Patient-Specific Goal (Individualized)  Outcome: Ongoing, Progressing  Goal: Absence of Hospital-Acquired Illness or Injury  Outcome: Ongoing, Progressing  Goal: Optimal Comfort and Wellbeing  Outcome: Ongoing, Progressing  Goal: Readiness for Transition of Care  Outcome: Ongoing, Progressing     Problem: Fluid and Electrolyte Imbalance (Acute Kidney Injury/Impairment)  Goal: Fluid and Electrolyte Balance  Outcome: Ongoing, Progressing     Problem: Oral Intake Inadequate (Acute Kidney Injury/Impairment)  Goal: Optimal Nutrition Intake  Outcome: Ongoing, Progressing     Problem: Renal Function Impairment (Acute Kidney Injury/Impairment)  Goal: Effective Renal Function  Outcome: Ongoing, Progressing     Problem: Pain Acute  Goal: Acceptable Pain Control and Functional Ability  Outcome: Ongoing, Progressing     Problem: Fall Injury Risk  Goal: Absence of Fall and Fall-Related Injury  Outcome: Ongoing, Progressing

## 2023-10-27 NOTE — PROGRESS NOTES
[facility] Medicine  Progress Note    Patient Name: Edita Riley  MRN: [id number]  Patient Class: OP- Observation   Admission Date: 10/26/2023  Length of Stay: 0 days  Attending Physician: Kathryn Taylor*  Primary Care Provider: Trina Vu MD        Subjective:     Principal Problem:Nephrostomy status        HPI:  Ms. Riley is a 59 y/o female with a PMH of b/t ureteral sticture secondary to radiation for cervical cancer, s/p open transverse colon conduit urinary diversion on 9/11/2020, MDD, b/t nephrostomy tubes ( right side tube has been removed, left was left in place), multiple episodes of pyelonephritis since August 2023 presenting due to her Left nephrostomy tube falling out. Patient reports that she was changing her colostomy bag when she noticed that her left nephrostomy tube had fallen out. Patient reports that nothing seemed to incite the tube falling out, she reports that it seemed to dislodge and come out on it's own. Patient denies fever, body aches, headaches, N/V/D, dizziness, chest pain, and SOB. Patient does admit to abdominal pain that has been ongoing these past few days which she recently went to the ED for on 10/21/23. Patient reports that she was found to have a UTI, micro shows E. Faecalis. She reports that she received a call about initiating IV Abx but has not heard back in the past few days. She reports flank pain near L nephrostomy site as well. Patient reports she ws supposed to have two surgeries in November. One on 11/6/23 with CRS and the other on 11/28/23 with Urology. She reports normal ostomy output and no evidence of bleeding.     In the ED, patient is resting in bed. She is reporting flank pain and abdominal pain. She is HDS stable and afebrile. ED has consulted Urology and IR for left nephrostomy tube placement. Urology planning no intervention at this time. CT on 10/21/23 revealed stable moderate to severe right  hydronephrosis. Wbc wnl. Cr 1.4 baseline is around 1.0. And Hgb 11.2, patient reports no active bleeding.             Overview/Hospital Course:  No notes on file    Interval history: S/p nephrostomy tube replacement, NAEON, nauseous, no other complaints this morning, ambulating without issue    Past Medical History:   Diagnosis Date    Abnormal mammogram 08/25/2020    Acute blood loss anemia     Acute deep vein thrombosis (DVT) of lower extremity 12/09/2020    Advance care planning 04/30/2021    Anemia due to chronic blood loss     Anemia due to chronic kidney disease     Anxiety     Bilateral ureteral obstruction 9/11/2020    Cardiovascular event risk -- low 09/14/2015    ASCVD 10-year risk 1.9% (with optimal risk factors 1.3%) as of 9/14/2015     Cervical cancer 2014    Chronic back pain     Colostomy care     Deep vein thrombosis     Depression     Diarrhea due to malabsorption 11/14/2018    Difficult intubation     Disorder of kidney and ureter     DVT of lower extremity, bilateral 11/04/2020    Emphysema of lung 4/10/2023    Fibromyalgia     Fungemia 09/27/2020    Generalized abdominal pain 08/25/2020    GERD (gastroesophageal reflux disease)     Hemifacial spasm 09/16/2015    Hiatal hernia 2014    History of cervical cancer 10/11/2018    Hx of psychiatric care     Cymbalta, trazodone    Hypertension     Hypomagnesemia 11/21/2018    Lactose intolerance     Metastatic squamous cell carcinoma to lymph node 10/11/2018    Neuropathy due to chemotherapeutic drug 11/14/2018    Osteoarthritis of back     Peritonitis 09/22/2020    Pseudomonas urinary tract infection 04/21/2021    Psychiatric problem     Refusal of blood transfusions as patient is Cheondoism     Schatzki's ring 09/14/2015    Seen on outside EGD 05/2014, underwent esophageal dilatation. Bx were negative.     Seizures     Sleep stage dysfunction     Noted on PSG 06/2017; negative for obstructive sleep apnea      Stroke     Urinary tract infection associated with nephrostomy catheter 06/11/2020    Wound infection after surgery 09/24/2020       Past Surgical History:   Procedure Laterality Date    ANTEGRADE NEPHROSTOGRAPHY Bilateral 9/28/2020    Procedure: Nephrostogram - antegrade;  Surgeon: Leslie Balbuena MD;  Location: [facility] OR 1ST FLR;  Service: Urology;  Laterality: Bilateral;    ANTEGRADE NEPHROSTOGRAPHY Left 4/20/2021    Procedure: NEPHROSTOGRAM, ANTEGRADE;  Surgeon: Leslie Balbuena MD;  Location: [facility] OR 1ST FLR;  Service: Urology;  Laterality: Left;    ANTEGRADE NEPHROSTOGRAPHY Right 10/27/2022    Procedure: NEPHROSTOGRAM, ANTEGRADE;  Surgeon: Chirag Russ MD;  Location: [facility] OR 1ST FLR;  Service: Urology;  Laterality: Right;    ANTEGRADE NEPHROSTOGRAPHY Right 4/21/2023    Procedure: NEPHROSTOGRAM, ANTEGRADE;  Surgeon: Chirag Russ MD;  Location: [facility] OR 2ND FLR;  Service: Urology;  Laterality: Right;    ANTEGRADE NEPHROSTOGRAPHY Left 6/6/2023    Procedure: Nephrostogram - antegrade;  Surgeon: Leslie Balbuena MD;  Location: [facility] OR 1ST FLR;  Service: Urology;  Laterality: Left;    BILATERAL OOPHORECTOMY  2015    CHOLECYSTECTOMY  11/09/2016    Done at [facility], path showed chronic cholecystitis and gallstones    cold knife conization  2014    COLECTOMY, RIGHT  9/17/2020    Procedure: COLECTOMY, RIGHT Extended;  Surgeon: Hammad Reynolds MD;  Location: [facility] OR 2ND FLR;  Service: Colon and Rectal;;    COLONOSCOPY  2014    COLONOSCOPY N/A 10/24/2016    at [facility], Dr Thomas    COLONOSCOPY N/A 5/18/2018    Procedure: COLONOSCOPY;  Surgeon: Arden Gutiérrez MD;  Location: [facility] ENDO (4TH FLR);  Service: Endoscopy;  Laterality: N/A;    COLONOSCOPY N/A 7/28/2020    Procedure: COLONOSCOPY;  Surgeon: Hammad Reynolds MD;  Location: [facility] ENDO (4TH FLR);  Service: Colon and Rectal;  Laterality: N/A;  covid test [place] 7/25    CYSTOSCOPY WITH URETEROSCOPY, RETROGRADE PYELOGRAPHY, AND INSERTION OF  STENT Bilateral 3/21/2020    Procedure: CYSTOSCOPY, WITH RETROGRADE PYELOGRAM,;  Surgeon: Leslie Balbuena MD;  Location: [facility] OR 1ST FLR;  Service: Urology;  Laterality: Bilateral;    DILATION OF NEPHROSTOMY TRACT Right 10/27/2022    Procedure: DILATION, NEPHROSTOMY TRACT;  Surgeon: Chirag Russ MD;  Location: [facility] OR 1ST FLR;  Service: Urology;  Laterality: Right;    ESOPHAGOGASTRODUODENOSCOPY  2014    ESOPHAGOGASTRODUODENOSCOPY  11/18/2020    ESOPHAGOGASTRODUODENOSCOPY N/A 11/18/2020    Procedure: ESOPHAGOGASTRODUODENOSCOPY (EGD);  Surgeon: Zenon Spencer MD;  [facility];  Service: Endoscopy;  Laterality: N/A;    ESOPHAGOGASTRODUODENOSCOPY N/A 12/11/2020    Procedure: EGD (ESOPHAGOGASTRODUODENOSCOPY);  Surgeon: Juancho Muse MD;  Location: [facility] (2ND FLR);  Service: Endoscopy;  Laterality: N/A;    HYSTERECTOMY  2014    [facility] for cervical [facility]    ILEOSTOMY  9/17/2020    Procedure: CREATION, ILEOSTOMY;  Surgeon: Hammad Reynolds MD;  Location: [facility] OR 2ND FLR;  Service: Colon and Rectal;;    LOOPOGRAM N/A 4/20/2021    Procedure: LOOPOGRAM;  Surgeon: Leslie Balbuena MD;  Location: [facility] OR 1ST FLR;  Service: Urology;  Laterality: N/A;    LOOPOGRAM N/A 6/6/2023    Procedure: LOOPOGRAM;  Surgeon: Leslie Balbuena MD;  Location: [facility] OR 1ST FLR;  Service: Urology;  Laterality: N/A;    MOBILIZATION OF SPLENIC FLEXURE N/A 9/11/2020    Procedure: MOBILIZATION, COLONIC;  Surgeon: Hammad Reynolds MD;  Location: [facility] OR 2ND FLR;  Service: Colon and Rectal;  Laterality: N/A;    NEPHROSTOGRAPHY Bilateral 4/17/2021    Procedure: Nephrostogram;  Surgeon: Celeste Surgeon;  [facility];  Service: Anesthesiology;  Laterality: Bilateral;    OOPHORECTOMY      PERCUTANEOUS NEPHROLITHOTOMY Right 4/21/2023    Procedure: NEPHROLITHOTOMY, PERCUTANEOUS;  Surgeon: Chirag Russ MD;  Location: [facility] OR 2ND FLR;  Service: Urology;  Laterality: Right;  2.5 hrs    PERCUTANEOUS NEPHROSTOMY   4/21/2023    Procedure: CREATION, NEPHROSTOMY, PERCUTANEOUS with removal of existing nephrostomy tube;  Surgeon: Chirag Russ MD;  Location: [facility] OR 2ND FLR;  Service: Urology;;    PYELOSCOPY Right 10/27/2022    Procedure: PYELOSCOPY;  Surgeon: Chirag Russ MD;  Location: [facility] OR [facility] FLR;  Service: Urology;  Laterality: Right;    REPLACEMENT OF NEPHROSTOMY TUBE Right 10/27/2022    Procedure: REPLACEMENT, NEPHROSTOMY TUBE;  Surgeon: Chirag Russ MD;  Location: [facility] OR 1ST FLR;  Service: Urology;  Laterality: Right;    RETROPERITONEAL LYMPHADENECTOMY Right 9/17/2018    Procedure: LYMPHADENECTOMY, RETROPERITONEUM;  Surgeon: Sebas Reed MD;  Location: [facility] OR 2ND FLR;  Service: General;  Laterality: Right;  ILIAC    URETEROSCOPIC REMOVAL OF URETERIC CALCULUS Right 10/27/2022    Procedure: REMOVAL, CALCULUS, URETER, URETEROSCOPIC;  Surgeon: Chirag Russ MD;  [facility];  Service: Urology;  Laterality: Right;    URETEROSCOPY Right 10/27/2022    Procedure: URETEROSCOPY-ANTEGRADE;  Surgeon: Chirag Russ MD;  Location: [facility] OR [address] [place] [address];  Service: Urology;  Laterality: Right;       Review of patient's allergies indicates:   Allergen Reactions    Bee sting [allergen ext-venom-honey bee]      Rash      Grass pollen-bermuda, standard      rash       No current facility-administered medications on file prior to encounter.     Current Outpatient Medications on File Prior to Encounter   Medication Sig    gabapentin (NEURONTIN) 100 MG capsule Take 2 capsules (200 mg total) by mouth every evening.    loratadine (CLARITIN) 10 mg tablet Take 10 mg by mouth once daily.    mirtazapine (REMERON) 30 MG tablet Take 1 tablet (30 mg total) by mouth nightly.    oxyCODONE (ROXICODONE) 5 MG immediate release tablet Take 1 tablet (5 mg total) by mouth every 12 (twelve) hours as needed for Pain.    tolterodine (DETROL LA) 2 MG Cp24 Take 2 mg by mouth once daily.     Family History        Problem Relation (Age of Onset)    Breast cancer Maternal Aunt    Cancer Father, Mother    Cervical cancer Mother    Colon cancer Father    Drug abuse Daughter, Daughter    Esophageal cancer Father    Heart disease Sister, Maternal Grandmother    Suicide Daughter          Tobacco Use    Smoking status: Never    Smokeless tobacco: Never   Substance and Sexual Activity    Alcohol use: No     Alcohol/week: 0.0 standard drinks of alcohol    Drug use: No    Sexual activity: Yes     Partners: Male     Birth control/protection: None     Comment:  19 years since 1999     Review of Systems   Constitutional:  Positive for chills. Negative for activity change, appetite change, fatigue, fever and unexpected weight change.   HENT:  Negative for sinus pain and sore throat.    Respiratory:  Negative for cough, choking, chest tightness, shortness of breath, wheezing and stridor.    Cardiovascular:  Negative for chest pain, palpitations and leg swelling.   Gastrointestinal:  Positive for abdominal pain. Negative for nausea and vomiting.   Genitourinary:         Patient had left nephrostomy bag with clear to yellow urine prior to it being removed    Musculoskeletal:  Positive for arthralgias.   Neurological:  Negative for dizziness, light-headedness and headaches.   Psychiatric/Behavioral:  Negative for agitation, confusion and hallucinations.      Objective:     Vital Signs (Most Recent):  Temp: 97.2 °F (36.2 °C) (10/26/23 0754)  Pulse: 71 (10/26/23 0754)  Resp: 16 (10/26/23 0754)  BP: 100/63 (10/26/23 0754)  SpO2: 99 % (10/26/23 0754) Vital Signs (24h Range):  Temp:  [97.2 °F (36.2 °C)-98 °F (36.7 °C)] 97.2 °F (36.2 °C)  Pulse:  [71-88] 71  Resp:  [16-20] 16  SpO2:  [97 %-100 %] 99 %  BP: (100-121)/(52-66) 100/63     Weight: 84.3 kg (185 lb 13.6 oz)  Body mass index is 27.44 kg/m².     Physical Exam  Constitutional:       Appearance: Normal appearance. She is not ill-appearing.   HENT:      Head: Normocephalic and  atraumatic.      Nose: Nose normal.      Mouth/Throat:      Mouth: Mucous membranes are moist.   Eyes:      Extraocular Movements: Extraocular movements intact.   Cardiovascular:      Rate and Rhythm: Normal rate and regular rhythm.      Pulses: Normal pulses.      Heart sounds: Normal heart sounds. No murmur heard.     No friction rub.   Pulmonary:      Effort: Pulmonary effort is normal.      Breath sounds: Normal breath sounds.   Abdominal:      Tenderness: There is no abdominal tenderness. There is no right CVA tenderness or left CVA tenderness.      Comments: Patient has colostomy bag with normal output and ileostomy bag with normal output. She also has a mid abdominal scar that seems to be well healed.    Musculoskeletal:         General: No swelling or tenderness.      Comments: Nephrostomy tube replaced, urine in bag, No drainage and no erythema noted on skin near tube, dressing CDI   Skin:     General: Skin is warm.      Capillary Refill: Capillary refill takes less than 2 seconds.   Neurological:      General: No focal deficit present.      Mental Status: She is alert and oriented to person, place, and time.   Psychiatric:         Mood and Affect: Affect is flat.                Significant Labs: All pertinent labs within the past 24 hours have been reviewed.    Significant Imaging: I have reviewed all pertinent imaging results/findings within the past 24 hours.      Assessment/Plan:      * Nephrostomy status  Patient has a PMH of b/t ureteral sticture secondary to radiation for cervical cancer, b/t nephrostomy tubes ( right side tube has been removed, left was left in place), presenting due to her Left nephrostomy tube falling out. Patient reports that she was changing her colostomy bag when she noticed that her left nephrostomy tube had fallen out. Patient reports that nothing seemed to incite the tube falling out, she reports that it seemed to dislodge and come out on it's own. Patient denies fever, body  aches, headaches, N/V/D, dizziness, chest pain, and SOB.  She does report flank pain near L nephrostomy site but no drainage. Patient reports she ws supposed to have two surgeries in November. One on 11/6/23 with CRS and the other on 11/28/23 with Urology. She reports normal ostomy output and no evidence of bleeding.     In the ED, patient was evaluated by urology and they reported no anticipated urologic intervention this admission.     PLAN:   - s/p tube replacement, draining urine  - pain control with prn meds   - Urology f/u upon discharge. Patient was schedule for urological procedure on 11/28/23       UTI (urinary tract infection)  Patient admits to abdominal pain that has been ongoing these past few days which she recently went to the ED for on 10/21/23. Patient reports that she was found to have a UTI, micro shows E. Faecalis. She reports that she received a call about initiating IV Abx but has not heard back in the past few days. She reports flank pain near L nephrostomy site as well.     PLAN:   - Based on susceptibilities, starting treatment with Vancomycin transitioned to ampicillin. Will transition to amoxicillin BID at discharge to complete 14 day course          Major depressive disorder, recurrent episode, moderate  Patient has persistent depression which is severe and is currently controlled. Will Continue anti-depressant medications. We will not consult psychiatry at this time. Patient does not display psychosis at this time. Continue to monitor closely and adjust plan of care as needed.        Anemia due to chronic kidney disease  Patient has Hgb of 11.2 upon admission. Hgb is chronically low. Patient is having no active bleeding. No evidence in ostomy bags either.     PLAN:  - Continue to monitor CBC   - Transfuse for Hgb < 7   - Monitor for signs of active bleeding from ostomy bag           ODESSA (acute kidney injury)  Patient with acute kidney injury/acute renal failure likely due to  "post-obstructive d/t Ureteral strictures ODESSA is currently stable. Baseline creatinine 1.0 - Labs reviewed- Renal function/electrolytes with Estimated Creatinine Clearance: 49.5 mL/min (based on SCr of 1.4 mg/dL). according to latest data. Monitor urine output and serial BMP and adjust therapy as needed. Avoid nephrotoxins and renally dose meds for GFR listed above.       CT scan from 10/21/23 revealed "  Left-sided nephrostomy tube in place with mild prominence of the left renal collecting system as well as moderate to severe right-sided hydronephrosis, stable from prior examination."     PLAN:   - Nephrostomy tube placement with IR   - If creatinine does not improve consider imaging, retroperitoneal U/S    History of essential hypertension  Patient reports hx of HTN. In the ED, pressures are soft. She reports she is not on any anti-HTN medication at home     PLAN:   - Continue to monitor patient BP, consider starting prn anti-hypertensive if BP elevated        VTE Risk Mitigation (From admission, onward)         Ordered     enoxaparin injection 40 mg  Daily         10/26/23 0446     IP VTE HIGH RISK PATIENT  Once         10/26/23 0446     Place sequential compression device  Until discontinued         10/26/23 0446                Discharge Planning   OK: 10/28/2023     Code Status: Full Code   Is the patient medically ready for discharge?: No    Reason for patient still in hospital (select all that apply): Treatment  Discharge Plan A: Home with family   Discharge Delays: None known at this time              Alex Whiteside MD  Department of Hospital Medicine   [facility] - Med Surg    "

## 2023-10-28 LAB
ALBUMIN SERPL BCP-MCNC: 2.6 G/DL (ref 3.5–5.2)
ALP SERPL-CCNC: 115 U/L (ref 55–135)
ALT SERPL W/O P-5'-P-CCNC: 16 U/L (ref 10–44)
ANION GAP SERPL CALC-SCNC: 12 MMOL/L (ref 8–16)
AST SERPL-CCNC: 15 U/L (ref 10–40)
BASOPHILS # BLD AUTO: 0.02 K/UL (ref 0–0.2)
BASOPHILS NFR BLD: 0.2 % (ref 0–1.9)
BILIRUB SERPL-MCNC: 0.5 MG/DL (ref 0.1–1)
BUN SERPL-MCNC: 11 MG/DL (ref 6–20)
CALCIUM SERPL-MCNC: 9.1 MG/DL (ref 8.7–10.5)
CHLORIDE SERPL-SCNC: 113 MMOL/L (ref 95–110)
CO2 SERPL-SCNC: 18 MMOL/L (ref 23–29)
CREAT SERPL-MCNC: 1.4 MG/DL (ref 0.5–1.4)
DIFFERENTIAL METHOD: ABNORMAL
EOSINOPHIL # BLD AUTO: 0.3 K/UL (ref 0–0.5)
EOSINOPHIL NFR BLD: 2.9 % (ref 0–8)
ERYTHROCYTE [DISTWIDTH] IN BLOOD BY AUTOMATED COUNT: 15.4 % (ref 11.5–14.5)
EST. GFR  (NO RACE VARIABLE): 43.1 ML/MIN/1.73 M^2
GLUCOSE SERPL-MCNC: 97 MG/DL (ref 70–110)
HCT VFR BLD AUTO: 36.9 % (ref 37–48.5)
HGB BLD-MCNC: 11.3 G/DL (ref 12–16)
IMM GRANULOCYTES # BLD AUTO: 0.08 K/UL (ref 0–0.04)
IMM GRANULOCYTES NFR BLD AUTO: 0.9 % (ref 0–0.5)
LYMPHOCYTES # BLD AUTO: 1.5 K/UL (ref 1–4.8)
LYMPHOCYTES NFR BLD: 16.7 % (ref 18–48)
MAGNESIUM SERPL-MCNC: 1.9 MG/DL (ref 1.6–2.6)
MCH RBC QN AUTO: 28 PG (ref 27–31)
MCHC RBC AUTO-ENTMCNC: 30.6 G/DL (ref 32–36)
MCV RBC AUTO: 92 FL (ref 82–98)
MONOCYTES # BLD AUTO: 1.3 K/UL (ref 0.3–1)
MONOCYTES NFR BLD: 14.4 % (ref 4–15)
NEUTROPHILS # BLD AUTO: 5.9 K/UL (ref 1.8–7.7)
NEUTROPHILS NFR BLD: 64.9 % (ref 38–73)
NRBC BLD-RTO: 0 /100 WBC
PHOSPHATE SERPL-MCNC: 3.1 MG/DL (ref 2.7–4.5)
PLATELET # BLD AUTO: 269 K/UL (ref 150–450)
PMV BLD AUTO: 10.2 FL (ref 9.2–12.9)
POTASSIUM SERPL-SCNC: 3.8 MMOL/L (ref 3.5–5.1)
PROT SERPL-MCNC: 7 G/DL (ref 6–8.4)
RBC # BLD AUTO: 4.03 M/UL (ref 4–5.4)
SODIUM SERPL-SCNC: 143 MMOL/L (ref 136–145)
WBC # BLD AUTO: 9.08 K/UL (ref 3.9–12.7)

## 2023-10-28 PROCEDURE — 25000003 PHARM REV CODE 250

## 2023-10-28 PROCEDURE — 84100 ASSAY OF PHOSPHORUS: CPT

## 2023-10-28 PROCEDURE — 85025 COMPLETE CBC W/AUTO DIFF WBC: CPT

## 2023-10-28 PROCEDURE — 63600175 PHARM REV CODE 636 W HCPCS

## 2023-10-28 PROCEDURE — 25000003 PHARM REV CODE 250: Performed by: HOSPITALIST

## 2023-10-28 PROCEDURE — A4216 STERILE WATER/SALINE, 10 ML: HCPCS | Performed by: HOSPITALIST

## 2023-10-28 PROCEDURE — 11000001 HC ACUTE MED/SURG PRIVATE ROOM

## 2023-10-28 PROCEDURE — 83735 ASSAY OF MAGNESIUM: CPT

## 2023-10-28 PROCEDURE — 36415 COLL VENOUS BLD VENIPUNCTURE: CPT

## 2023-10-28 PROCEDURE — 80053 COMPREHEN METABOLIC PANEL: CPT

## 2023-10-28 RX ORDER — AMOXICILLIN 875 MG/1
875 TABLET, FILM COATED ORAL EVERY 12 HOURS
Status: DISCONTINUED | OUTPATIENT
Start: 2023-10-28 | End: 2023-10-29

## 2023-10-28 RX ORDER — ONDANSETRON 8 MG/1
8 TABLET, ORALLY DISINTEGRATING ORAL EVERY 6 HOURS PRN
Status: DISCONTINUED | OUTPATIENT
Start: 2023-10-28 | End: 2023-10-29 | Stop reason: HOSPADM

## 2023-10-28 RX ADMIN — Medication 10 ML: at 06:10

## 2023-10-28 RX ADMIN — SODIUM CHLORIDE 500 ML: 9 INJECTION, SOLUTION INTRAVENOUS at 01:10

## 2023-10-28 RX ADMIN — AMOXICILLIN 875 MG: 875 TABLET, COATED ORAL at 08:10

## 2023-10-28 RX ADMIN — Medication 10 ML: at 09:10

## 2023-10-28 RX ADMIN — ONDANSETRON 8 MG: 8 TABLET, ORALLY DISINTEGRATING ORAL at 06:10

## 2023-10-28 RX ADMIN — ONDANSETRON 8 MG: 8 TABLET, ORALLY DISINTEGRATING ORAL at 11:10

## 2023-10-28 RX ADMIN — AMOXICILLIN 875 MG: 875 TABLET, COATED ORAL at 09:10

## 2023-10-28 RX ADMIN — POTASSIUM BICARBONATE 25 MEQ: 978 TABLET, EFFERVESCENT ORAL at 06:10

## 2023-10-28 RX ADMIN — ENOXAPARIN SODIUM 40 MG: 40 INJECTION SUBCUTANEOUS at 06:10

## 2023-10-28 RX ADMIN — MIRTAZAPINE 30 MG: 30 TABLET, FILM COATED ORAL at 08:10

## 2023-10-28 RX ADMIN — AMPICILLIN 2 G: 2 INJECTION, POWDER, FOR SOLUTION INTRAMUSCULAR; INTRAVENOUS at 06:10

## 2023-10-28 RX ADMIN — Medication 10 ML: at 01:10

## 2023-10-28 NOTE — HOSPITAL COURSE
The patient was admitted to hospital medicine for further management of her displaced nephrostomy tube. Urology evaluated the patient on admission. Interventional radiology placed a percutaneous nephrostomy tube on 10/26. She had a slight ODESSA which was treated with IV fluids and monitored. Her urinary tract infection was treated while admitted. Plan for outpatient completion of oral antibiotic course. She was nauseated while taking oral antibiotics which improved with oral antiemetics. Discharged home with 10 days of oral abx to complete a 14 total day course.       Review of Systems   Constitutional:  Negative for activity change, appetite change, chills, fatigue, fever and unexpected weight change.   HENT:  Negative for sinus pain and sore throat.    Respiratory:  Negative for cough, choking, chest tightness, shortness of breath, wheezing and stridor.    Cardiovascular:  Negative for chest pain, palpitations and leg swelling.   Gastrointestinal:  Negative for abdominal pain, nausea and vomiting.   Genitourinary:  Negative for dysuria and flank pain.   Musculoskeletal:  Negative for arthralgias.   Neurological:  Negative for dizziness, light-headedness and headaches.   Psychiatric/Behavioral:  Negative for agitation, confusion and hallucinations.       Objective:      Vital Signs (Most Recent):  Temp: 98.2 °F (36.8 °C) (10/28/23 1131)  Pulse: 73 (10/28/23 1131)  Resp: 18 (10/28/23 1131)  BP: 121/63 (10/28/23 1131)  SpO2: 98 % (10/28/23 1131) Vital Signs (24h Range):  Temp:  [97.2 °F (36.2 °C)-98.9 °F (37.2 °C)] 98.2 °F (36.8 °C)  Pulse:  [73-87] 73  Resp:  [18] 18  SpO2:  [92 %-99 %] 98 %  BP: (103-128)/(53-63) 121/63      Weight: 84.3 kg (185 lb 13.6 oz)  Body mass index is 27.44 kg/m².     Intake/Output Summary (Last 24 hours) at 10/28/2023 0844  Last data filed at 10/28/2023 0949      Gross per 24 hour   Intake --   Output 1900 ml   Net -1900 ml         Physical Exam  Constitutional:       Appearance: Normal  appearance. She is not ill-appearing.   HENT:      Head: Normocephalic and atraumatic.      Nose: Nose normal.      Mouth/Throat:      Mouth: Mucous membranes are moist.   Eyes:      Extraocular Movements: Extraocular movements intact.   Cardiovascular:      Rate and Rhythm: Normal rate and regular rhythm.      Pulses: Normal pulses.      Heart sounds: Normal heart sounds. No murmur heard.     No friction rub.   Pulmonary:      Effort: Pulmonary effort is normal.      Breath sounds: Normal breath sounds.   Abdominal:      Tenderness: There is no abdominal tenderness. There is no right CVA tenderness or left CVA tenderness.      Comments: She has a mid abdominal scar that seems to be well healed.  ileostomy bag leaking this morning,   Genitourinary:     Comments: Left nephrostomy tube without erythema/tenderness/purulence   Musculoskeletal:         General: No swelling or tenderness.      Comments: Nephrostomy tube replaced, urine in bag, No drainage and no erythema noted on skin near tube, dressing CDI   Skin:     General: Skin is warm.      Capillary Refill: Capillary refill takes less than 2 seconds.   Neurological:      Mental Status: She is alert and oriented to person, place, and time.   Psychiatric:         Mood and Affect: Affect is flat.

## 2023-10-28 NOTE — ASSESSMENT & PLAN NOTE
"Patient with acute kidney injury/acute renal failure likely due to post-obstructive d/t Ureteral strictures ODESSA is currently stable. Baseline creatinine 1.0 - Labs reviewed- Renal function/electrolytes with Estimated Creatinine Clearance: 49.5 mL/min (based on SCr of 1.4 mg/dL). according to latest data. Monitor urine output and serial BMP and adjust therapy as needed. Avoid nephrotoxins and renally dose meds for GFR listed above.     CT scan from 10/21/23 revealed "  Left-sided nephrostomy tube in place with mild prominence of the left renal collecting system as well as moderate to severe right-sided hydronephrosis, stable from prior examination."     PLAN:   - s/p Nephrostomy tube placement with IR  - 500cc IV fluid bolus to supplement oral intake  - Will trend Cr in AM   "

## 2023-10-28 NOTE — PROGRESS NOTES
[facility] Medicine  Progress Note    Patient Name: Edita Riley  MRN: [id number]  Patient Class: IP- Inpatient   Admission Date: 10/26/2023  Length of Stay: 1 days  Attending Physician: Kathryn Taylor*  Primary Care Provider: Trina Vu MD      Subjective:     Principal Problem:Nephrostomy status    HPI:  Ms. Riley is a 57 y/o female with a PMH of b/t ureteral sticture secondary to radiation for cervical cancer, s/p open transverse colon conduit urinary diversion on 9/11/2020, MDD, b/t nephrostomy tubes ( right side tube has been removed, left was left in place), multiple episodes of pyelonephritis since August 2023 presenting due to her Left nephrostomy tube falling out. Patient reports that she was changing her colostomy bag when she noticed that her left nephrostomy tube had fallen out. Patient reports that nothing seemed to incite the tube falling out, she reports that it seemed to dislodge and come out on it's own. Patient denies fever, body aches, headaches, N/V/D, dizziness, chest pain, and SOB. Patient does admit to abdominal pain that has been ongoing these past few days which she recently went to the ED for on 10/21/23. Patient reports that she was found to have a UTI, micro shows E. Faecalis. She reports that she received a call about initiating IV Abx but has not heard back in the past few days. She reports flank pain near L nephrostomy site as well. Patient reports she ws supposed to have two surgeries in November. One on 11/6/23 with CRS and the other on 11/28/23 with Urology. She reports normal ostomy output and no evidence of bleeding.     In the ED, patient is resting in bed. She is reporting flank pain and abdominal pain. She is HDS stable and afebrile. ED has consulted Urology and IR for left nephrostomy tube placement. Urology planning no intervention at this time. CT on 10/21/23 revealed stable moderate to severe right hydronephrosis. Wbc  wnl. Cr 1.4 baseline is around 1.0. And Hgb 11.2, patient reports no active bleeding.             Overview/Hospital Course:  The patient was admitted to hospital medicine for further management of her displaced nephrostomy tube. Urology evaluated the patient on admission. Interventional radiology placed a percutaneous nephrostomy tube on 10/26. She had a slight ODESSA which was treated with IV fluids and monitored. Her urinary tract infection was treated while admitted. Plan for outpatient completion of oral antibiotic course. She was nauseated while taking oral antibiotics which improved with oral antiemetics.      Interval History: Creatinine remains elevated but stable. Will continue gentle IV fluids to supplement oral intake. Nausea improved moderately with oral antiemetics.    Review of Systems   Constitutional:  Negative for activity change, appetite change, chills, fatigue, fever and unexpected weight change.   HENT:  Negative for sinus pain and sore throat.    Respiratory:  Negative for cough, choking, chest tightness, shortness of breath, wheezing and stridor.    Cardiovascular:  Negative for chest pain, palpitations and leg swelling.   Gastrointestinal:  Negative for abdominal pain, nausea and vomiting.   Genitourinary:  Negative for dysuria and flank pain.   Musculoskeletal:  Negative for arthralgias.   Neurological:  Negative for dizziness, light-headedness and headaches.   Psychiatric/Behavioral:  Negative for agitation, confusion and hallucinations.      Objective:     Vital Signs (Most Recent):  Temp: 98.2 °F (36.8 °C) (10/28/23 1131)  Pulse: 73 (10/28/23 1131)  Resp: 18 (10/28/23 1131)  BP: 121/63 (10/28/23 1131)  SpO2: 98 % (10/28/23 1131) Vital Signs (24h Range):  Temp:  [97.2 °F (36.2 °C)-98.9 °F (37.2 °C)] 98.2 °F (36.8 °C)  Pulse:  [73-87] 73  Resp:  [18] 18  SpO2:  [92 %-99 %] 98 %  BP: (103-128)/(53-63) 121/63     Weight: 84.3 kg (185 lb 13.6 oz)  Body mass index is 27.44 kg/m².    Intake/Output  Summary (Last 24 hours) at 10/28/2023 1335  Last data filed at 10/28/2023 0949  Gross per 24 hour   Intake --   Output 1900 ml   Net -1900 ml         Physical Exam  Constitutional:       Appearance: Normal appearance. She is not ill-appearing.   HENT:      Head: Normocephalic and atraumatic.      Nose: Nose normal.      Mouth/Throat:      Mouth: Mucous membranes are moist.   Eyes:      Extraocular Movements: Extraocular movements intact.   Cardiovascular:      Rate and Rhythm: Normal rate and regular rhythm.      Pulses: Normal pulses.      Heart sounds: Normal heart sounds. No murmur heard.     No friction rub.   Pulmonary:      Effort: Pulmonary effort is normal.      Breath sounds: Normal breath sounds.   Abdominal:      Tenderness: There is no abdominal tenderness. There is no right CVA tenderness or left CVA tenderness.      Comments: She has a mid abdominal scar that seems to be well healed.    Genitourinary:     Comments: Left nephrostomy tube without erythema/tenderness/purulence   Musculoskeletal:         General: No swelling or tenderness.      Comments: Nephrostomy tube replaced, urine in bag, No drainage and no erythema noted on skin near tube, dressing CDI   Skin:     General: Skin is warm.      Capillary Refill: Capillary refill takes less than 2 seconds.   Neurological:      Mental Status: She is alert and oriented to person, place, and time.   Psychiatric:         Mood and Affect: Affect is flat.       Significant Labs: All pertinent labs within the past 24 hours have been reviewed.  CBC:   Recent Labs   Lab 10/27/23  0532 10/28/23  0321   WBC 10.25 9.08   HGB 10.8* 11.3*   HCT 36.5* 36.9*    269     CMP:   Recent Labs   Lab 10/27/23  0532 10/27/23  1623 10/28/23  0321    140 143   K 3.9 3.9 3.8   * 110 113*   CO2 17* 21* 18*   GLU 99 94 97   BUN 15 13 11   CREATININE 1.5* 1.5* 1.4   CALCIUM 8.8 9.5 9.1   PROT 6.6  --  7.0   ALBUMIN 2.6*  --  2.6*   BILITOT 0.5  --  0.5   ALKPHOS  115  --  115   AST 24  --  15   ALT 20  --  16   ANIONGAP 10 9 12       Significant Imaging: I have reviewed all pertinent imaging results/findings within the past 24 hours.      Assessment/Plan:      * Nephrostomy status  Patient has a PMH of b/t ureteral sticture secondary to radiation for cervical cancer, b/t nephrostomy tubes ( right side tube has been removed, left was left in place), presenting due to her Left nephrostomy tube falling out. Patient reports that she was changing her colostomy bag when she noticed that her left nephrostomy tube had fallen out. Patient reports that nothing seemed to incite the tube falling out, she reports that it seemed to dislodge and come out on it's own. Patient denies fever, body aches, headaches, N/V/D, dizziness, chest pain, and SOB.  She does report flank pain near L nephrostomy site but no drainage. Patient reports she ws supposed to have two surgeries in November. One on 11/6/23 with CRS and the other on 11/28/23 with Urology. She reports normal ostomy output and no evidence of bleeding.     PLAN:   - s/p tube replacement, draining urine  - pain control with prn meds   - Urology f/u upon discharge. Patient was schedule for urological procedure on 11/28/23       Nephrostomy complication  See 'nephrostomy status'      UTI (urinary tract infection)  Patient admits to abdominal pain that has been ongoing these past few days which she recently went to the ED for on 10/21/23. Patient reports that she was found to have a UTI, micro shows E. Faecalis. She reports that she received a call about initiating IV Abx but has not heard back in the past few days. She reports flank pain near L nephrostomy site as well.     PLAN:   - Based on susceptibilities, starting treatment with Vancomycin transitioned to ampicillin.   - Transitioned to amoxicillin BID at discharge to complete 14 day course    Major depressive disorder, recurrent episode, moderate  Patient has persistent depression  "which is severe and is currently controlled. Will Continue anti-depressant medications. We will not consult psychiatry at this time. Patient does not display psychosis at this time. Continue to monitor closely and adjust plan of care as needed.        Anemia due to chronic kidney disease  Patient has Hgb of 11.2 upon admission. Hgb is chronically low. Patient is having no active bleeding. No evidence in ostomy bags either.     PLAN:  - Continue to monitor CBC   - Transfuse for Hgb < 7   - Monitor for signs of active bleeding from ostomy bag           ODESSA (acute kidney injury)  Patient with acute kidney injury/acute renal failure likely due to post-obstructive d/t Ureteral strictures ODESSA is currently stable. Baseline creatinine 1.0 - Labs reviewed- Renal function/electrolytes with Estimated Creatinine Clearance: 49.5 mL/min (based on SCr of 1.4 mg/dL). according to latest data. Monitor urine output and serial BMP and adjust therapy as needed. Avoid nephrotoxins and renally dose meds for GFR listed above.     CT scan from 10/21/23 revealed "  Left-sided nephrostomy tube in place with mild prominence of the left renal collecting system as well as moderate to severe right-sided hydronephrosis, stable from prior examination."     PLAN:   - s/p Nephrostomy tube placement with IR  - 500cc IV fluid bolus to supplement oral intake  - Will trend Cr in AM     History of essential hypertension  Patient reports hx of HTN. In the ED, pressures are soft. She reports she is not on any anti-HTN medication at home     PLAN:   - Continue to monitor patient BP, consider starting prn anti-hypertensive if BP elevated      VTE Risk Mitigation (From admission, onward)         Ordered     enoxaparin injection 40 mg  Daily         10/26/23 0446     IP VTE HIGH RISK PATIENT  Once         10/26/23 0446     Place sequential compression device  Until discontinued         10/26/23 0446                Discharge Planning   OK: 10/29/2023     Code " Status: Full Code   Is the patient medically ready for discharge?: No    Reason for patient still in hospital (select all that apply): Patient trending condition  Discharge Plan A: Home with family   Discharge Delays: None known at this time      Andriy Coley MD  Department of Hospital Medicine   [facility]

## 2023-10-28 NOTE — SUBJECTIVE & OBJECTIVE
Interval History: Creatinine remains elevated but stable. Will continue gentle IV fluids to supplement oral intake. Nausea improved moderately with oral antiemetics.    Review of Systems   Constitutional:  Negative for activity change, appetite change, chills, fatigue, fever and unexpected weight change.   HENT:  Negative for sinus pain and sore throat.    Respiratory:  Negative for cough, choking, chest tightness, shortness of breath, wheezing and stridor.    Cardiovascular:  Negative for chest pain, palpitations and leg swelling.   Gastrointestinal:  Negative for abdominal pain, nausea and vomiting.   Genitourinary:  Negative for dysuria and flank pain.   Musculoskeletal:  Negative for arthralgias.   Neurological:  Negative for dizziness, light-headedness and headaches.   Psychiatric/Behavioral:  Negative for agitation, confusion and hallucinations.      Objective:     Vital Signs (Most Recent):  Temp: 98.2 °F (36.8 °C) (10/28/23 1131)  Pulse: 73 (10/28/23 1131)  Resp: 18 (10/28/23 1131)  BP: 121/63 (10/28/23 1131)  SpO2: 98 % (10/28/23 1131) Vital Signs (24h Range):  Temp:  [97.2 °F (36.2 °C)-98.9 °F (37.2 °C)] 98.2 °F (36.8 °C)  Pulse:  [73-87] 73  Resp:  [18] 18  SpO2:  [92 %-99 %] 98 %  BP: (103-128)/(53-63) 121/63     Weight: 84.3 kg (185 lb 13.6 oz)  Body mass index is 27.44 kg/m².    Intake/Output Summary (Last 24 hours) at 10/28/2023 1335  Last data filed at 10/28/2023 0949  Gross per 24 hour   Intake --   Output 1900 ml   Net -1900 ml         Physical Exam  Constitutional:       Appearance: Normal appearance. She is not ill-appearing.   HENT:      Head: Normocephalic and atraumatic.      Nose: Nose normal.      Mouth/Throat:      Mouth: Mucous membranes are moist.   Eyes:      Extraocular Movements: Extraocular movements intact.   Cardiovascular:      Rate and Rhythm: Normal rate and regular rhythm.      Pulses: Normal pulses.      Heart sounds: Normal heart sounds. No murmur heard.     No friction rub.    Pulmonary:      Effort: Pulmonary effort is normal.      Breath sounds: Normal breath sounds.   Abdominal:      Tenderness: There is no abdominal tenderness. There is no right CVA tenderness or left CVA tenderness.      Comments: She has a mid abdominal scar that seems to be well healed.    Genitourinary:     Comments: Left nephrostomy tube without erythema/tenderness/purulence   Musculoskeletal:         General: No swelling or tenderness.      Comments: Nephrostomy tube replaced, urine in bag, No drainage and no erythema noted on skin near tube, dressing CDI   Skin:     General: Skin is warm.      Capillary Refill: Capillary refill takes less than 2 seconds.   Neurological:      Mental Status: She is alert and oriented to person, place, and time.   Psychiatric:         Mood and Affect: Affect is flat.       Significant Labs: All pertinent labs within the past 24 hours have been reviewed.  CBC:   Recent Labs   Lab 10/27/23  0532 10/28/23  0321   WBC 10.25 9.08   HGB 10.8* 11.3*   HCT 36.5* 36.9*    269     CMP:   Recent Labs   Lab 10/27/23  0532 10/27/23  1623 10/28/23  0321    140 143   K 3.9 3.9 3.8   * 110 113*   CO2 17* 21* 18*   GLU 99 94 97   BUN 15 13 11   CREATININE 1.5* 1.5* 1.4   CALCIUM 8.8 9.5 9.1   PROT 6.6  --  7.0   ALBUMIN 2.6*  --  2.6*   BILITOT 0.5  --  0.5   ALKPHOS 115  --  115   AST 24  --  15   ALT 20  --  16   ANIONGAP 10 9 12       Significant Imaging: I have reviewed all pertinent imaging results/findings within the past 24 hours.

## 2023-10-28 NOTE — PLAN OF CARE
Problem: Adult Inpatient Plan of Care  Goal: Plan of Care Review  Outcome: Ongoing, Progressing  Goal: Patient-Specific Goal (Individualized)  Outcome: Ongoing, Progressing  Goal: Absence of Hospital-Acquired Illness or Injury  Outcome: Ongoing, Progressing  Goal: Optimal Comfort and Wellbeing  Outcome: Ongoing, Progressing  Goal: Readiness for Transition of Care  Outcome: Ongoing, Progressing     Problem: Fluid and Electrolyte Imbalance (Acute Kidney Injury/Impairment)  Goal: Fluid and Electrolyte Balance  Outcome: Ongoing, Progressing     Problem: Oral Intake Inadequate (Acute Kidney Injury/Impairment)  Goal: Optimal Nutrition Intake  Outcome: Ongoing, Progressing     Problem: Renal Function Impairment (Acute Kidney Injury/Impairment)  Goal: Effective Renal Function  Outcome: Ongoing, Progressing     Problem: Pain Acute  Goal: Acceptable Pain Control and Functional Ability  Outcome: Ongoing, Progressing     Problem: Fall Injury Risk  Goal: Absence of Fall and Fall-Related Injury  Outcome: Ongoing, Progressing     Problem: Infection  Goal: Absence of Infection Signs and Symptoms  Outcome: Ongoing, Progressing

## 2023-10-28 NOTE — ASSESSMENT & PLAN NOTE
Patient has a PMH of b/t ureteral sticture secondary to radiation for cervical cancer, b/t nephrostomy tubes ( right side tube has been removed, left was left in place), presenting due to her Left nephrostomy tube falling out. Patient reports that she was changing her colostomy bag when she noticed that her left nephrostomy tube had fallen out. Patient reports that nothing seemed to incite the tube falling out, she reports that it seemed to dislodge and come out on it's own. Patient denies fever, body aches, headaches, N/V/D, dizziness, chest pain, and SOB.  She does report flank pain near L nephrostomy site but no drainage. Patient reports she ws supposed to have two surgeries in November. One on 11/6/23 with CRS and the other on 11/28/23 with Urology. She reports normal ostomy output and no evidence of bleeding.     PLAN:   - s/p tube replacement, draining urine  - pain control with prn meds   - Urology f/u upon discharge. Patient was schedule for urological procedure on 11/28/23

## 2023-10-28 NOTE — ASSESSMENT & PLAN NOTE
Patient admits to abdominal pain that has been ongoing these past few days which she recently went to the ED for on 10/21/23. Patient reports that she was found to have a UTI, micro shows E. Faecalis. She reports that she received a call about initiating IV Abx but has not heard back in the past few days. She reports flank pain near L nephrostomy site as well.     PLAN:   - Based on susceptibilities, starting treatment with Vancomycin transitioned to ampicillin.   - Transitioned to amoxicillin BID at discharge to complete 14 day course

## 2023-10-29 VITALS
RESPIRATION RATE: 18 BRPM | DIASTOLIC BLOOD PRESSURE: 57 MMHG | HEART RATE: 61 BPM | WEIGHT: 185.88 LBS | SYSTOLIC BLOOD PRESSURE: 102 MMHG | HEIGHT: 69 IN | BODY MASS INDEX: 27.53 KG/M2 | TEMPERATURE: 97 F | OXYGEN SATURATION: 97 %

## 2023-10-29 LAB
ALBUMIN SERPL BCP-MCNC: 2.8 G/DL (ref 3.5–5.2)
ALP SERPL-CCNC: 123 U/L (ref 55–135)
ALT SERPL W/O P-5'-P-CCNC: 15 U/L (ref 10–44)
ANION GAP SERPL CALC-SCNC: 13 MMOL/L (ref 8–16)
AST SERPL-CCNC: 13 U/L (ref 10–40)
BASOPHILS # BLD AUTO: 0.03 K/UL (ref 0–0.2)
BASOPHILS NFR BLD: 0.4 % (ref 0–1.9)
BILIRUB SERPL-MCNC: 0.3 MG/DL (ref 0.1–1)
BUN SERPL-MCNC: 9 MG/DL (ref 6–20)
CALCIUM SERPL-MCNC: 9.7 MG/DL (ref 8.7–10.5)
CHLORIDE SERPL-SCNC: 110 MMOL/L (ref 95–110)
CO2 SERPL-SCNC: 19 MMOL/L (ref 23–29)
CREAT SERPL-MCNC: 1.3 MG/DL (ref 0.5–1.4)
DIFFERENTIAL METHOD: ABNORMAL
EOSINOPHIL # BLD AUTO: 0.3 K/UL (ref 0–0.5)
EOSINOPHIL NFR BLD: 4.1 % (ref 0–8)
ERYTHROCYTE [DISTWIDTH] IN BLOOD BY AUTOMATED COUNT: 15.3 % (ref 11.5–14.5)
EST. GFR  (NO RACE VARIABLE): 47.1 ML/MIN/1.73 M^2
GLUCOSE SERPL-MCNC: 85 MG/DL (ref 70–110)
HCT VFR BLD AUTO: 40 % (ref 37–48.5)
HGB BLD-MCNC: 11.7 G/DL (ref 12–16)
IMM GRANULOCYTES # BLD AUTO: 0.02 K/UL (ref 0–0.04)
IMM GRANULOCYTES NFR BLD AUTO: 0.3 % (ref 0–0.5)
LYMPHOCYTES # BLD AUTO: 1.4 K/UL (ref 1–4.8)
LYMPHOCYTES NFR BLD: 20 % (ref 18–48)
MAGNESIUM SERPL-MCNC: 1.9 MG/DL (ref 1.6–2.6)
MCH RBC QN AUTO: 27.3 PG (ref 27–31)
MCHC RBC AUTO-ENTMCNC: 29.3 G/DL (ref 32–36)
MCV RBC AUTO: 94 FL (ref 82–98)
MONOCYTES # BLD AUTO: 0.8 K/UL (ref 0.3–1)
MONOCYTES NFR BLD: 11.8 % (ref 4–15)
NEUTROPHILS # BLD AUTO: 4.5 K/UL (ref 1.8–7.7)
NEUTROPHILS NFR BLD: 63.4 % (ref 38–73)
NRBC BLD-RTO: 0 /100 WBC
PHOSPHATE SERPL-MCNC: 3 MG/DL (ref 2.7–4.5)
PLATELET # BLD AUTO: 280 K/UL (ref 150–450)
PMV BLD AUTO: 10.8 FL (ref 9.2–12.9)
POTASSIUM SERPL-SCNC: 3.9 MMOL/L (ref 3.5–5.1)
PROT SERPL-MCNC: 7.6 G/DL (ref 6–8.4)
RBC # BLD AUTO: 4.28 M/UL (ref 4–5.4)
SODIUM SERPL-SCNC: 142 MMOL/L (ref 136–145)
WBC # BLD AUTO: 7.14 K/UL (ref 3.9–12.7)

## 2023-10-29 PROCEDURE — 36415 COLL VENOUS BLD VENIPUNCTURE: CPT

## 2023-10-29 PROCEDURE — 83735 ASSAY OF MAGNESIUM: CPT

## 2023-10-29 PROCEDURE — 84100 ASSAY OF PHOSPHORUS: CPT

## 2023-10-29 PROCEDURE — 25000003 PHARM REV CODE 250

## 2023-10-29 PROCEDURE — 80053 COMPREHEN METABOLIC PANEL: CPT

## 2023-10-29 PROCEDURE — A4216 STERILE WATER/SALINE, 10 ML: HCPCS | Performed by: HOSPITALIST

## 2023-10-29 PROCEDURE — 25000003 PHARM REV CODE 250: Performed by: HOSPITALIST

## 2023-10-29 PROCEDURE — 85025 COMPLETE CBC W/AUTO DIFF WBC: CPT

## 2023-10-29 RX ORDER — POTASSIUM CHLORIDE 20 MEQ/1
20 TABLET, EXTENDED RELEASE ORAL ONCE
Status: DISCONTINUED | OUTPATIENT
Start: 2023-10-29 | End: 2023-10-29

## 2023-10-29 RX ORDER — AMOXICILLIN 875 MG/1
875 TABLET, FILM COATED ORAL EVERY 12 HOURS
Qty: 20 TABLET | Refills: 0 | Status: ON HOLD | OUTPATIENT
Start: 2023-10-29 | End: 2023-11-06 | Stop reason: HOSPADM

## 2023-10-29 RX ORDER — POTASSIUM CHLORIDE 20 MEQ/1
20 TABLET, EXTENDED RELEASE ORAL ONCE
Status: COMPLETED | OUTPATIENT
Start: 2023-10-29 | End: 2023-10-29

## 2023-10-29 RX ORDER — AMOXICILLIN 875 MG/1
875 TABLET, FILM COATED ORAL ONCE
Status: COMPLETED | OUTPATIENT
Start: 2023-10-29 | End: 2023-10-29

## 2023-10-29 RX ADMIN — AMOXICILLIN 875 MG: 875 TABLET, COATED ORAL at 08:10

## 2023-10-29 RX ADMIN — Medication 10 ML: at 11:10

## 2023-10-29 RX ADMIN — POTASSIUM CHLORIDE 20 MEQ: 1500 TABLET, EXTENDED RELEASE ORAL at 08:10

## 2023-10-29 RX ADMIN — OXYCODONE HYDROCHLORIDE 10 MG: 10 TABLET ORAL at 08:10

## 2023-10-29 RX ADMIN — AMOXICILLIN 875 MG: 875 TABLET, COATED ORAL at 02:10

## 2023-10-29 NOTE — NURSING
Patient received discharge instructions and verbalized understanding. IV discontinued with catheter tip intact. Patient in room waiting for wheelchair transport.

## 2023-10-29 NOTE — ASSESSMENT & PLAN NOTE
Patient has a PMH of b/t ureteral sticture secondary to radiation for cervical cancer, b/t nephrostomy tubes ( right side tube has been removed, left was left in place), presenting due to her Left nephrostomy tube falling out. Patient reports that she was changing her colostomy bag when she noticed that her left nephrostomy tube had fallen out. Patient reports that nothing seemed to incite the tube falling out, she reports that it seemed to dislodge and come out on it's own. Patient denies fever, body aches, headaches, N/V/D, dizziness, chest pain, and SOB.  She does report flank pain near L nephrostomy site but no drainage. Patient reports she ws supposed to have two surgeries in November. One on 11/6/23 with CRS and the other on 11/28/23 with Urology. She reports normal ostomy output and no evidence of bleeding.     PLAN:   - s/p tube replacement, draining urine  - Amoxicillin BID for 10 days to complete 14 day course  - pain control with prn meds   - Urology f/u upon discharge. Patient was schedule for urological procedure on 11/28/23

## 2023-10-29 NOTE — DISCHARGE SUMMARY
[facility] Medicine  Discharge Summary      Patient Name: Edita Riley  MRN: [id number]  [facility]: [id number]  Patient Class: IP- Inpatient  Admission Date: 10/26/2023  Hospital Length of Stay: 2 days  Discharge Date and Time:  10/29/2023 12:44 PM  Attending Physician: Kathryn Taylor*   Discharging Provider: Alex Whiteside MD  Primary Care Provider: Trina Vu MD  [facility] Medicine Team: [facility] 2 Alex Whiteside MD  Primary Care Team: [facility] 2    HPI:   Ms. Riley is a 57 y/o female with a PMH of b/t ureteral sticture secondary to radiation for cervical cancer, s/p open transverse colon conduit urinary diversion on 9/11/2020, MDD, b/t nephrostomy tubes ( right side tube has been removed, left was left in place), multiple episodes of pyelonephritis since August 2023 presenting due to her Left nephrostomy tube falling out. Patient reports that she was changing her colostomy bag when she noticed that her left nephrostomy tube had fallen out. Patient reports that nothing seemed to incite the tube falling out, she reports that it seemed to dislodge and come out on it's own. Patient denies fever, body aches, headaches, N/V/D, dizziness, chest pain, and SOB. Patient does admit to abdominal pain that has been ongoing these past few days which she recently went to the ED for on 10/21/23. Patient reports that she was found to have a UTI, micro shows E. Faecalis. She reports that she received a call about initiating IV Abx but has not heard back in the past few days. She reports flank pain near L nephrostomy site as well. Patient reports she ws supposed to have two surgeries in November. One on 11/6/23 with CRS and the other on 11/28/23 with Urology. She reports normal ostomy output and no evidence of bleeding.     In the ED, patient is resting in bed. She is reporting flank pain and abdominal pain. She is HDS stable and afebrile. ED has consulted Urology and IR for  left nephrostomy tube placement. Urology planning no intervention at this time. CT on 10/21/23 revealed stable moderate to severe right hydronephrosis. Wbc wnl. Cr 1.4 baseline is around 1.0. And Hgb 11.2, patient reports no active bleeding.           * No surgery found *      Hospital Course:   The patient was admitted to hospital medicine for further management of her displaced nephrostomy tube. Urology evaluated the patient on admission. Interventional radiology placed a percutaneous nephrostomy tube on 10/26. She had a slight ODESSA which was treated with IV fluids and monitored. Her urinary tract infection was treated while admitted. Plan for outpatient completion of oral antibiotic course. She was nauseated while taking oral antibiotics which improved with oral antiemetics. Discharged home with 10 days of oral abx to complete a 14 total day course.       Review of Systems   Constitutional:  Negative for activity change, appetite change, chills, fatigue, fever and unexpected weight change.   HENT:  Negative for sinus pain and sore throat.    Respiratory:  Negative for cough, choking, chest tightness, shortness of breath, wheezing and stridor.    Cardiovascular:  Negative for chest pain, palpitations and leg swelling.   Gastrointestinal:  Negative for abdominal pain, nausea and vomiting.   Genitourinary:  Negative for dysuria and flank pain.   Musculoskeletal:  Negative for arthralgias.   Neurological:  Negative for dizziness, light-headedness and headaches.   Psychiatric/Behavioral:  Negative for agitation, confusion and hallucinations.       Objective:      Vital Signs (Most Recent):  Temp: 98.2 °F (36.8 °C) (10/28/23 1131)  Pulse: 73 (10/28/23 1131)  Resp: 18 (10/28/23 1131)  BP: 121/63 (10/28/23 1131)  SpO2: 98 % (10/28/23 1131) Vital Signs (24h Range):  Temp:  [97.2 °F (36.2 °C)-98.9 °F (37.2 °C)] 98.2 °F (36.8 °C)  Pulse:  [73-87] 73  Resp:  [18] 18  SpO2:  [92 %-99 %] 98 %  BP: (103-128)/(53-63) 121/63       Weight: 84.3 kg (185 lb 13.6 oz)  Body mass index is 27.44 kg/m².     Intake/Output Summary (Last 24 hours) at 10/28/2023 1335  Last data filed at 10/28/2023 0949      Gross per 24 hour   Intake --   Output 1900 ml   Net -1900 ml         Physical Exam  Constitutional:       Appearance: Normal appearance. She is not ill-appearing.   HENT:      Head: Normocephalic and atraumatic.      Nose: Nose normal.      Mouth/Throat:      Mouth: Mucous membranes are moist.   Eyes:      Extraocular Movements: Extraocular movements intact.   Cardiovascular:      Rate and Rhythm: Normal rate and regular rhythm.      Pulses: Normal pulses.      Heart sounds: Normal heart sounds. No murmur heard.     No friction rub.   Pulmonary:      Effort: Pulmonary effort is normal.      Breath sounds: Normal breath sounds.   Abdominal:      Tenderness: There is no abdominal tenderness. There is no right CVA tenderness or left CVA tenderness.      Comments: She has a mid abdominal scar that seems to be well healed.  ileostomy bag leaking this morning,   Genitourinary:     Comments: Left nephrostomy tube without erythema/tenderness/purulence   Musculoskeletal:         General: No swelling or tenderness.      Comments: Nephrostomy tube replaced, urine in bag, No drainage and no erythema noted on skin near tube, dressing CDI   Skin:     General: Skin is warm.      Capillary Refill: Capillary refill takes less than 2 seconds.   Neurological:      Mental Status: She is alert and oriented to person, place, and time.   Psychiatric:         Mood and Affect: Affect is flat.      Goals of Care Treatment Preferences:  Code Status: Full Code      Consults:   Consults (From admission, onward)          Status Ordering Provider     Inpatient consult to Midline team  Once        Provider:  (Not yet assigned)    Completed SAUL AKBAR     Inpatient consult to Interventional Radiology  Once        Provider:  (Not yet assigned)    Completed DANIS HOLM JR.      Inpatient consult to Urology  Once        Provider:  (Not yet assigned)    Completed DANIS HOLM JR. new Assessment & Plan notes have been filed under this hospital service since the last note was generated.  Service: Hospital Medicine    Final Active Diagnoses:    Diagnosis Date Noted POA    PRINCIPAL PROBLEM:  Nephrostomy status [Z93.6]  Not Applicable     Chronic    UTI (urinary tract infection) [N39.0] 10/26/2023 Yes    Nephrostomy complication [N99.528] 10/26/2023 Yes    Major depressive disorder, recurrent episode, moderate [F33.1] 06/02/2023 Yes     Chronic    Anemia due to chronic kidney disease [N18.9, D63.1] 05/18/2020 Yes     Chronic    ODESSA (acute kidney injury) [N17.9] 03/21/2020 Yes    History of essential hypertension [Z86.79] 05/04/2015 Not Applicable      Problems Resolved During this Admission:    Diagnosis Date Noted Date Resolved POA    Depression [F32.A]  10/26/2023 Yes     Chronic    Generalized anxiety disorder [F41.1] 10/17/2018 10/26/2023 Yes     Chronic       Discharged Condition: good    Disposition: Home-Health Care [facility]    Follow Up:   Follow-up Information       Trina Vu MD Follow up.    Specialty: Family Medicine  Contact information:  [address] [place] [address] [phone]                           Patient Instructions:      Ambulatory referral/consult to Urology   Standing Status: Future   Referral Priority: Routine Referral Type: Consultation   Referral Reason: Specialty Services Required   Requested Specialty: Urology   Number of Visits Requested: 1       Significant Diagnostic Studies: N/A    Pending Diagnostic Studies:       None           Medications:  Reconciled Home Medications:      Medication List        START taking these medications      amoxicillin 875 MG tablet  Commonly known as: AMOXIL  Take 1 tablet (875 mg total) by mouth every 12 (twelve) hours. for 10 days            CONTINUE taking these medications      gabapentin 100 MG  capsule  Commonly known as: NEURONTIN  Take 2 capsules (200 mg total) by mouth every evening.     loratadine 10 mg tablet  Commonly known as: CLARITIN  Take 10 mg by mouth once daily.     mirtazapine 30 MG tablet  Commonly known as: REMERON  Take 1 tablet (30 mg total) by mouth nightly.     oxyCODONE 5 MG immediate release tablet  Commonly known as: ROXICODONE  Take 1 tablet (5 mg total) by mouth every 12 (twelve) hours as needed for Pain.     tolterodine 2 MG Cp24  Commonly known as: DETROL LA  Take 2 mg by mouth once daily.              Indwelling Lines/Drains at time of discharge:   Lines/Drains/Airways       Drain  Duration                  Urostomy 09/11/20 0000 ileal conduit LLQ 1143 days         Ileostomy 09/18/20 RLQ 1136 days         Nephrostomy 10/26/23 0933 Left 12 Fr. 3 days                    Time spent on the discharge of patient: 45 minutes         Alex Whiteside MD  Department of Hospital Medicine  [facility]

## 2023-10-30 NOTE — PLAN OF CARE
[facility] - Med Surg  Discharge Final Note    Primary Care Provider: Trina Vu MD    Expected Discharge Date: 10/29/2023    Final Discharge Note (most recent)       Final Note - 10/29/23 1938          Final Note    Assessment Type Final Discharge Note     Anticipated Discharge Disposition Home or Self Care        Post-Acute Status    Post-Acute Authorization Other     Coverage MEDICAID - [facility]     Other Status No Post-Acute Service Needs                     Important Message from Medicare             Contact Info       Trina Vu MD   Specialty: Family Medicine   Relationship: PCP - General    [address] [place] [address]   Phone: [phone]       Next Steps: Follow up          Future Appointments   Date Time Provider Department Center   11/6/2023  9:20 AM Hammad Reynolds MD [facility]

## 2023-10-31 ENCOUNTER — TELEPHONE (OUTPATIENT)
Dept: SURGERY | Facility: CLINIC | Age: 58
End: 2023-10-31
Payer: MEDICAID

## 2023-10-31 NOTE — TELEPHONE ENCOUNTER
----- Message from Vanessa David sent at 10/31/2023  3:06 PM CDT -----  Regarding: appt  Contact: @[phone]  Pt called in regards to informing the doctor she can not have any surgery right now she has a infection and needs that to clear up 1st .....Please call and adv @[phone]

## 2023-11-03 ENCOUNTER — HOSPITAL ENCOUNTER (INPATIENT)
Facility: HOSPITAL | Age: 58
LOS: 3 days | Discharge: HOME | DRG: 389 | End: 2023-11-06
Attending: STUDENT IN AN ORGANIZED HEALTH CARE EDUCATION/TRAINING PROGRAM | Admitting: INTERNAL MEDICINE
Payer: MEDICAID

## 2023-11-03 DIAGNOSIS — R07.9 CHEST PAIN: ICD-10-CM

## 2023-11-03 DIAGNOSIS — R11.10 VOMITING: ICD-10-CM

## 2023-11-03 DIAGNOSIS — Z01.89 ENCOUNTER FOR IMAGING STUDY TO CONFIRM NASOGASTRIC (NG) TUBE PLACEMENT: ICD-10-CM

## 2023-11-03 DIAGNOSIS — K56.609 SMALL BOWEL OBSTRUCTION: Primary | ICD-10-CM

## 2023-11-03 DIAGNOSIS — R10.84 GENERALIZED ABDOMINAL PAIN: ICD-10-CM

## 2023-11-03 LAB
ALBUMIN SERPL BCP-MCNC: 3.5 G/DL (ref 3.5–5.2)
ALP SERPL-CCNC: 133 U/L (ref 55–135)
ALT SERPL W/O P-5'-P-CCNC: 16 U/L (ref 10–44)
ANION GAP SERPL CALC-SCNC: 11 MMOL/L (ref 8–16)
AST SERPL-CCNC: 20 U/L (ref 10–40)
BACTERIA #/AREA URNS AUTO: ABNORMAL /HPF
BASOPHILS # BLD AUTO: 0.05 K/UL (ref 0–0.2)
BASOPHILS NFR BLD: 0.4 % (ref 0–1.9)
BILIRUB SERPL-MCNC: 0.2 MG/DL (ref 0.1–1)
BILIRUB UR QL STRIP: NEGATIVE
BUN SERPL-MCNC: 22 MG/DL (ref 6–20)
BUN SERPL-MCNC: 23 MG/DL (ref 6–30)
CALCIUM SERPL-MCNC: 10.4 MG/DL (ref 8.7–10.5)
CHLORIDE SERPL-SCNC: 110 MMOL/L (ref 95–110)
CHLORIDE SERPL-SCNC: 111 MMOL/L (ref 95–110)
CLARITY UR REFRACT.AUTO: ABNORMAL
CO2 SERPL-SCNC: 17 MMOL/L (ref 23–29)
COLOR UR AUTO: YELLOW
CREAT SERPL-MCNC: 1.3 MG/DL (ref 0.5–1.4)
CREAT SERPL-MCNC: 1.4 MG/DL (ref 0.5–1.4)
DIFFERENTIAL METHOD: ABNORMAL
EOSINOPHIL # BLD AUTO: 0.1 K/UL (ref 0–0.5)
EOSINOPHIL NFR BLD: 1.1 % (ref 0–8)
ERYTHROCYTE [DISTWIDTH] IN BLOOD BY AUTOMATED COUNT: 15.3 % (ref 11.5–14.5)
EST. GFR  (NO RACE VARIABLE): 47.1 ML/MIN/1.73 M^2
GLUCOSE SERPL-MCNC: 112 MG/DL (ref 70–110)
GLUCOSE SERPL-MCNC: 119 MG/DL (ref 70–110)
GLUCOSE UR QL STRIP: NEGATIVE
HCT VFR BLD AUTO: 44.4 % (ref 37–48.5)
HCT VFR BLD CALC: 42 %PCV (ref 36–54)
HGB BLD-MCNC: 12.9 G/DL (ref 12–16)
HGB UR QL STRIP: NEGATIVE
HYALINE CASTS UR QL AUTO: 0 /LPF
IMM GRANULOCYTES # BLD AUTO: 0.02 K/UL (ref 0–0.04)
IMM GRANULOCYTES NFR BLD AUTO: 0.2 % (ref 0–0.5)
KETONES UR QL STRIP: NEGATIVE
LACTATE SERPL-SCNC: 1.8 MMOL/L (ref 0.5–2.2)
LEUKOCYTE ESTERASE UR QL STRIP: ABNORMAL
LIPASE SERPL-CCNC: 29 U/L (ref 4–60)
LYMPHOCYTES # BLD AUTO: 1.2 K/UL (ref 1–4.8)
LYMPHOCYTES NFR BLD: 10.6 % (ref 18–48)
MCH RBC QN AUTO: 27.5 PG (ref 27–31)
MCHC RBC AUTO-ENTMCNC: 29.1 G/DL (ref 32–36)
MCV RBC AUTO: 95 FL (ref 82–98)
MICROSCOPIC COMMENT: ABNORMAL
MONOCYTES # BLD AUTO: 0.7 K/UL (ref 0.3–1)
MONOCYTES NFR BLD: 6.3 % (ref 4–15)
NEUTROPHILS # BLD AUTO: 9.2 K/UL (ref 1.8–7.7)
NEUTROPHILS NFR BLD: 81.4 % (ref 38–73)
NITRITE UR QL STRIP: POSITIVE
NRBC BLD-RTO: 0 /100 WBC
PH UR STRIP: 5 [PH] (ref 5–8)
PLATELET # BLD AUTO: 268 K/UL (ref 150–450)
PMV BLD AUTO: 11.2 FL (ref 9.2–12.9)
POC IONIZED CALCIUM: 1.15 MMOL/L (ref 1.06–1.42)
POC TCO2 (MEASURED): 22 MMOL/L (ref 23–29)
POTASSIUM BLD-SCNC: 4.2 MMOL/L (ref 3.5–5.1)
POTASSIUM SERPL-SCNC: 4.2 MMOL/L (ref 3.5–5.1)
PROT SERPL-MCNC: 8.7 G/DL (ref 6–8.4)
PROT UR QL STRIP: ABNORMAL
RBC # BLD AUTO: 4.69 M/UL (ref 4–5.4)
RBC #/AREA URNS AUTO: 19 /HPF (ref 0–4)
SAMPLE: ABNORMAL
SODIUM BLD-SCNC: 141 MMOL/L (ref 136–145)
SODIUM SERPL-SCNC: 138 MMOL/L (ref 136–145)
SP GR UR STRIP: 1.01 (ref 1–1.03)
URN SPEC COLLECT METH UR: ABNORMAL
WBC # BLD AUTO: 11.28 K/UL (ref 3.9–12.7)
WBC #/AREA URNS AUTO: >100 /HPF (ref 0–5)
WBC CLUMPS UR QL AUTO: ABNORMAL

## 2023-11-03 PROCEDURE — 87077 CULTURE AEROBIC IDENTIFY: CPT | Performed by: STUDENT IN AN ORGANIZED HEALTH CARE EDUCATION/TRAINING PROGRAM

## 2023-11-03 PROCEDURE — 99285 EMERGENCY DEPT VISIT HI MDM: CPT | Mod: 25

## 2023-11-03 PROCEDURE — 93010 EKG 12-LEAD: ICD-10-PCS | Mod: ,,, | Performed by: INTERNAL MEDICINE

## 2023-11-03 PROCEDURE — 96361 HYDRATE IV INFUSION ADD-ON: CPT

## 2023-11-03 PROCEDURE — 87086 URINE CULTURE/COLONY COUNT: CPT | Performed by: STUDENT IN AN ORGANIZED HEALTH CARE EDUCATION/TRAINING PROGRAM

## 2023-11-03 PROCEDURE — 83690 ASSAY OF LIPASE: CPT | Performed by: STUDENT IN AN ORGANIZED HEALTH CARE EDUCATION/TRAINING PROGRAM

## 2023-11-03 PROCEDURE — 25500020 PHARM REV CODE 255: Performed by: STUDENT IN AN ORGANIZED HEALTH CARE EDUCATION/TRAINING PROGRAM

## 2023-11-03 PROCEDURE — 87088 URINE BACTERIA CULTURE: CPT | Performed by: STUDENT IN AN ORGANIZED HEALTH CARE EDUCATION/TRAINING PROGRAM

## 2023-11-03 PROCEDURE — 63600175 PHARM REV CODE 636 W HCPCS: Performed by: STUDENT IN AN ORGANIZED HEALTH CARE EDUCATION/TRAINING PROGRAM

## 2023-11-03 PROCEDURE — 80047 BASIC METABLC PNL IONIZED CA: CPT

## 2023-11-03 PROCEDURE — 93005 ELECTROCARDIOGRAM TRACING: CPT

## 2023-11-03 PROCEDURE — 80053 COMPREHEN METABOLIC PANEL: CPT | Performed by: STUDENT IN AN ORGANIZED HEALTH CARE EDUCATION/TRAINING PROGRAM

## 2023-11-03 PROCEDURE — 87186 SC STD MICRODIL/AGAR DIL: CPT | Performed by: STUDENT IN AN ORGANIZED HEALTH CARE EDUCATION/TRAINING PROGRAM

## 2023-11-03 PROCEDURE — 85025 COMPLETE CBC W/AUTO DIFF WBC: CPT | Performed by: STUDENT IN AN ORGANIZED HEALTH CARE EDUCATION/TRAINING PROGRAM

## 2023-11-03 PROCEDURE — 96375 TX/PRO/DX INJ NEW DRUG ADDON: CPT

## 2023-11-03 PROCEDURE — 87184 SC STD DISK METHOD PER PLATE: CPT | Performed by: STUDENT IN AN ORGANIZED HEALTH CARE EDUCATION/TRAINING PROGRAM

## 2023-11-03 PROCEDURE — 99222 PR INITIAL HOSPITAL CARE,LEVL II: ICD-10-PCS | Mod: ,,, | Performed by: STUDENT IN AN ORGANIZED HEALTH CARE EDUCATION/TRAINING PROGRAM

## 2023-11-03 PROCEDURE — 96374 THER/PROPH/DIAG INJ IV PUSH: CPT

## 2023-11-03 PROCEDURE — 12000002 HC ACUTE/MED SURGE SEMI-PRIVATE ROOM

## 2023-11-03 PROCEDURE — 81001 URINALYSIS AUTO W/SCOPE: CPT | Performed by: STUDENT IN AN ORGANIZED HEALTH CARE EDUCATION/TRAINING PROGRAM

## 2023-11-03 PROCEDURE — 83605 ASSAY OF LACTIC ACID: CPT | Performed by: STUDENT IN AN ORGANIZED HEALTH CARE EDUCATION/TRAINING PROGRAM

## 2023-11-03 PROCEDURE — 99222 1ST HOSP IP/OBS MODERATE 55: CPT | Mod: ,,, | Performed by: STUDENT IN AN ORGANIZED HEALTH CARE EDUCATION/TRAINING PROGRAM

## 2023-11-03 PROCEDURE — 93010 ELECTROCARDIOGRAM REPORT: CPT | Mod: ,,, | Performed by: INTERNAL MEDICINE

## 2023-11-03 RX ORDER — MORPHINE SULFATE 2 MG/ML
6 INJECTION, SOLUTION INTRAMUSCULAR; INTRAVENOUS
Status: COMPLETED | OUTPATIENT
Start: 2023-11-03 | End: 2023-11-03

## 2023-11-03 RX ORDER — ONDANSETRON 2 MG/ML
4 INJECTION INTRAMUSCULAR; INTRAVENOUS
Status: COMPLETED | OUTPATIENT
Start: 2023-11-03 | End: 2023-11-03

## 2023-11-03 RX ADMIN — IOHEXOL 100 ML: 350 INJECTION, SOLUTION INTRAVENOUS at 11:11

## 2023-11-03 RX ADMIN — SODIUM CHLORIDE, POTASSIUM CHLORIDE, SODIUM LACTATE AND CALCIUM CHLORIDE 1000 ML: 600; 310; 30; 20 INJECTION, SOLUTION INTRAVENOUS at 08:11

## 2023-11-03 RX ADMIN — MORPHINE SULFATE 6 MG: 2 INJECTION, SOLUTION INTRAMUSCULAR; INTRAVENOUS at 09:11

## 2023-11-03 RX ADMIN — ONDANSETRON 4 MG: 2 INJECTION INTRAMUSCULAR; INTRAVENOUS at 09:11

## 2023-11-04 PROBLEM — R10.84 GENERALIZED ABDOMINAL PAIN: Status: ACTIVE | Noted: 2023-11-04

## 2023-11-04 LAB
ALBUMIN SERPL BCP-MCNC: 3.2 G/DL (ref 3.5–5.2)
ALP SERPL-CCNC: 129 U/L (ref 55–135)
ALT SERPL W/O P-5'-P-CCNC: 18 U/L (ref 10–44)
ANION GAP SERPL CALC-SCNC: 11 MMOL/L (ref 8–16)
AST SERPL-CCNC: 27 U/L (ref 10–40)
BASOPHILS # BLD AUTO: 0.03 K/UL (ref 0–0.2)
BASOPHILS NFR BLD: 0.3 % (ref 0–1.9)
BILIRUB SERPL-MCNC: 0.3 MG/DL (ref 0.1–1)
BUN SERPL-MCNC: 20 MG/DL (ref 6–20)
CALCIUM SERPL-MCNC: 10 MG/DL (ref 8.7–10.5)
CHLORIDE SERPL-SCNC: 110 MMOL/L (ref 95–110)
CO2 SERPL-SCNC: 18 MMOL/L (ref 23–29)
CREAT SERPL-MCNC: 1.2 MG/DL (ref 0.5–1.4)
DIFFERENTIAL METHOD: ABNORMAL
EOSINOPHIL # BLD AUTO: 0.2 K/UL (ref 0–0.5)
EOSINOPHIL NFR BLD: 1.4 % (ref 0–8)
ERYTHROCYTE [DISTWIDTH] IN BLOOD BY AUTOMATED COUNT: 15 % (ref 11.5–14.5)
EST. GFR  (NO RACE VARIABLE): 51.8 ML/MIN/1.73 M^2
GLUCOSE SERPL-MCNC: 89 MG/DL (ref 70–110)
HCT VFR BLD AUTO: 39.5 % (ref 37–48.5)
HGB BLD-MCNC: 11.7 G/DL (ref 12–16)
IMM GRANULOCYTES # BLD AUTO: 0.03 K/UL (ref 0–0.04)
IMM GRANULOCYTES NFR BLD AUTO: 0.3 % (ref 0–0.5)
LYMPHOCYTES # BLD AUTO: 1.8 K/UL (ref 1–4.8)
LYMPHOCYTES NFR BLD: 16.1 % (ref 18–48)
MAGNESIUM SERPL-MCNC: 2.1 MG/DL (ref 1.6–2.6)
MCH RBC QN AUTO: 27 PG (ref 27–31)
MCHC RBC AUTO-ENTMCNC: 29.6 G/DL (ref 32–36)
MCV RBC AUTO: 91 FL (ref 82–98)
MONOCYTES # BLD AUTO: 1.4 K/UL (ref 0.3–1)
MONOCYTES NFR BLD: 12.4 % (ref 4–15)
NEUTROPHILS # BLD AUTO: 7.6 K/UL (ref 1.8–7.7)
NEUTROPHILS NFR BLD: 69.5 % (ref 38–73)
NRBC BLD-RTO: 0 /100 WBC
PHOSPHATE SERPL-MCNC: 3.8 MG/DL (ref 2.7–4.5)
PLATELET # BLD AUTO: 414 K/UL (ref 150–450)
PMV BLD AUTO: 9.6 FL (ref 9.2–12.9)
POTASSIUM SERPL-SCNC: 4.2 MMOL/L (ref 3.5–5.1)
PROT SERPL-MCNC: 7.9 G/DL (ref 6–8.4)
RBC # BLD AUTO: 4.33 M/UL (ref 4–5.4)
SODIUM SERPL-SCNC: 139 MMOL/L (ref 136–145)
WBC # BLD AUTO: 10.93 K/UL (ref 3.9–12.7)

## 2023-11-04 PROCEDURE — 25500020 PHARM REV CODE 255

## 2023-11-04 PROCEDURE — 36415 COLL VENOUS BLD VENIPUNCTURE: CPT

## 2023-11-04 PROCEDURE — 83735 ASSAY OF MAGNESIUM: CPT

## 2023-11-04 PROCEDURE — 85025 COMPLETE CBC W/AUTO DIFF WBC: CPT

## 2023-11-04 PROCEDURE — 84100 ASSAY OF PHOSPHORUS: CPT

## 2023-11-04 PROCEDURE — 25000003 PHARM REV CODE 250

## 2023-11-04 PROCEDURE — 80053 COMPREHEN METABOLIC PANEL: CPT

## 2023-11-04 PROCEDURE — 12000002 HC ACUTE/MED SURGE SEMI-PRIVATE ROOM

## 2023-11-04 PROCEDURE — 63600175 PHARM REV CODE 636 W HCPCS

## 2023-11-04 RX ORDER — SODIUM CHLORIDE 0.9 % (FLUSH) 0.9 %
10 SYRINGE (ML) INJECTION EVERY 12 HOURS PRN
Status: DISCONTINUED | OUTPATIENT
Start: 2023-11-04 | End: 2023-11-06 | Stop reason: HOSPADM

## 2023-11-04 RX ORDER — IBUPROFEN 200 MG
16 TABLET ORAL
Status: DISCONTINUED | OUTPATIENT
Start: 2023-11-04 | End: 2023-11-06 | Stop reason: HOSPADM

## 2023-11-04 RX ORDER — METRONIDAZOLE 500 MG/100ML
500 INJECTION, SOLUTION INTRAVENOUS
Status: DISCONTINUED | OUTPATIENT
Start: 2023-11-04 | End: 2023-11-06

## 2023-11-04 RX ORDER — ENOXAPARIN SODIUM 100 MG/ML
40 INJECTION SUBCUTANEOUS EVERY 24 HOURS
Status: DISCONTINUED | OUTPATIENT
Start: 2023-11-04 | End: 2023-11-06 | Stop reason: HOSPADM

## 2023-11-04 RX ORDER — CIPROFLOXACIN 2 MG/ML
400 INJECTION, SOLUTION INTRAVENOUS
Status: DISCONTINUED | OUTPATIENT
Start: 2023-11-04 | End: 2023-11-06

## 2023-11-04 RX ORDER — GLUCAGON 1 MG
1 KIT INJECTION
Status: DISCONTINUED | OUTPATIENT
Start: 2023-11-04 | End: 2023-11-06 | Stop reason: HOSPADM

## 2023-11-04 RX ORDER — SODIUM CHLORIDE 9 MG/ML
INJECTION, SOLUTION INTRAVENOUS CONTINUOUS
Status: ACTIVE | OUTPATIENT
Start: 2023-11-04 | End: 2023-11-04

## 2023-11-04 RX ORDER — IBUPROFEN 200 MG
24 TABLET ORAL
Status: DISCONTINUED | OUTPATIENT
Start: 2023-11-04 | End: 2023-11-06 | Stop reason: HOSPADM

## 2023-11-04 RX ORDER — NALOXONE HCL 0.4 MG/ML
0.02 VIAL (ML) INJECTION
Status: DISCONTINUED | OUTPATIENT
Start: 2023-11-04 | End: 2023-11-06 | Stop reason: HOSPADM

## 2023-11-04 RX ADMIN — SODIUM CHLORIDE: 9 INJECTION, SOLUTION INTRAVENOUS at 05:11

## 2023-11-04 RX ADMIN — ENOXAPARIN SODIUM 40 MG: 40 INJECTION SUBCUTANEOUS at 04:11

## 2023-11-04 RX ADMIN — SODIUM CHLORIDE: 9 INJECTION, SOLUTION INTRAVENOUS at 04:11

## 2023-11-04 RX ADMIN — CIPROFLOXACIN 400 MG: 2 INJECTION, SOLUTION INTRAVENOUS at 05:11

## 2023-11-04 RX ADMIN — DIATRIZOATE MEGLUMINE AND DIATRIZOATE SODIUM 100 ML: 660; 100 LIQUID ORAL; RECTAL at 10:11

## 2023-11-04 RX ADMIN — CIPROFLOXACIN 400 MG: 2 INJECTION, SOLUTION INTRAVENOUS at 04:11

## 2023-11-04 RX ADMIN — METRONIDAZOLE 500 MG: 500 INJECTION, SOLUTION INTRAVENOUS at 03:11

## 2023-11-04 RX ADMIN — METRONIDAZOLE 500 MG: 500 INJECTION, SOLUTION INTRAVENOUS at 11:11

## 2023-11-04 NOTE — HPI
"Edita Hardin [facility] is a 58 y.o. 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 This onset around 6 pm yesterday. It is located diffusely. She notes nausea with multiple episodes of emesis prior to arrival. She has had "normal" ileostomy output, not sure of volume. She has also had two recent admissions in Sep 2023 for abdominal pain and Oct 2023 for a dislodged neph tube requiring exchange.   She is afebrile and hemodynamically stable. Labs demonstrate WBC and lactate within normal limits, UA consistent with UTI, remainder of labs relatively unremarkable. CT demonstrates mildly dilated loops from possible infectious/inflammatory source or pSBO.     "

## 2023-11-04 NOTE — ASSESSMENT & PLAN NOTE
Patient admits to abdominal pain that had sudden onset today likely 2/2 to pSBO.  Patient recently went to the ED for on 10/21/23. Patient reports that she was found to have a UTI, micro shows E. Faecalis. She was admitted to the hospital on 10/26/23 for left nephrostomy tube getting dislodged. She was treated with Ampicillin initially based on susceptibility results for E. Faecalis and transitioned to Amoxicillin to finish out a 14 day total course. Patient presents 11/4/23 with UA revealing UTI, she is currently on day 8 out of 14. In regards to her UTI, unclear source from where collection is occurring. Whether it is ileostomy conduit or Nephrostomy bag. Due to this reordered UA with instructions to collect from nephrostomy bag after being emptied. UA was nitrite +, and leukocyte +. Unclear if source of urine collection on 10/21/23 was from nephrostomy bag, might have been from ileostomy which would be in picture of E. Faecalis.      PLAN:   - F/u with UA with clear instructions to collect from nephrostomy bag after emptying the bag   - Cipro and Flagyl for Gram - and anaerobic coverage of suspected intraabdominal infection

## 2023-11-04 NOTE — H&P
[facility] ([address] Medicine  History & Physical    Patient Name: Edita Riley  MRN: [id number]  Patient Class: IP- Inpatient  Admission Date: 11/3/2023  Attending Physician: No att. providers found   Primary Care Provider: Trina Vu MD         Patient information was obtained from patient and ER records.     Subjective:     Principal Problem:Generalized abdominal pain    Chief Complaint:   Chief Complaint   Patient presents with    Abdominal Pain     Generalized abd pain with N/V x1 day. Hx of cervical cancer        HPI: Ms. Riley is a 59 y/o female with a PMH of b/t ureteral sticture secondary to radiation for cervical cancer, s/p open transverse colon conduit urinary diversion on 9/11/2020, MDD, b/t nephrostomy tubes ( right side tube has been removed, left was left in place), multiple episodes of pyelonephritis since August 2023 presenting to the ED due to abdominal pain, nausea, and vomiting that started today around 6 PM. Patient reports that she was going about her regular day and a sharp diffuse abdominal pain started around the afternoon. She reports that the pain became associated with nausea and severe vomiting (Yellow vomit). Patient reports that before it started today, she had no symptoms the day prior. She reports normal output from her ostomy bags and nephrostomy bags. She reports that no blood was apparent in the bags and nothing seems to have changes in regards to the output these past few days.  She denies any headaches, chest pain, SOB, hematemesis, flank pain, and blurry vision. Patient reports that she has not changed her diet recently. She also reports that she doesn't take any medication except for Amoxicillin which she was sent home with after recently being discharged from the hospital. She denies any sick contacts as well.     Patient was recently admitted to the hospital on 10/26/23 due to further management of a displaced left  nephrostomy tube. Urology evaluated the patient on admission and recommended IR consult for left nephrostomy tube placement. Interventional radiology placed a percutaneous nephrostomy tube on 10/26. She had a slight ODESSA which was treated with IV fluids and monitored. Her urinary tract infection ( E. Faecalis)  which was found on 10/21/23  was being treated while she was admitted on 10/26/23. She was transitioned from IV ampicillin to PO Amoxicillin regimen upon discharge with instructions to take medication for 10 more days to complete a total of 14 day course. She was nauseated while taking oral antibiotics which improved with oral antiemetics.     In the ED, patient is afebrile and HDS. WBC is 11.28. Hgb is 12.9. Lipase is wnl at 29. Lactic acid is wnl at 1.8. AST/ALT is wnl at 20/16. UA is positive for 3+ leukocytes,nitrites, many bacteria, and WBC >100. CT demonstrates mildly dilated loops from possible infectious/inflammatory source or pSBO. General surgery evaluated her in the ED and recommended No acute surgical intervention recommended NGT placement, NPO status, and mIVF.            Past Medical History:   Diagnosis Date    Abnormal mammogram 08/25/2020    Acute blood loss anemia     Acute deep vein thrombosis (DVT) of lower extremity 12/09/2020    Advance care planning 04/30/2021    Anemia due to chronic blood loss     Anemia due to chronic kidney disease     Anxiety     Bilateral ureteral obstruction 9/11/2020    Cardiovascular event risk -- low 09/14/2015    ASCVD 10-year risk 1.9% (with optimal risk factors 1.3%) as of 9/14/2015     Cervical cancer 2014    Chronic back pain     Colostomy care     Deep vein thrombosis     Depression     Diarrhea due to malabsorption 11/14/2018    Difficult intubation     Disorder of kidney and ureter     DVT of lower extremity, bilateral 11/04/2020    Emphysema of lung 4/10/2023    Fibromyalgia     Fungemia 09/27/2020    Generalized abdominal pain 08/25/2020    GERD  (gastroesophageal reflux disease)     Hemifacial spasm 09/16/2015    Hiatal hernia 2014    History of cervical cancer 10/11/2018    Hx of psychiatric care     Cymbalta, trazodone    Hypertension     Hypomagnesemia 11/21/2018    Lactose intolerance     Metastatic squamous cell carcinoma to lymph node 10/11/2018    Neuropathy due to chemotherapeutic drug 11/14/2018    Osteoarthritis of back     Peritonitis 09/22/2020    Pseudomonas urinary tract infection 04/21/2021    Psychiatric problem     Refusal of blood transfusions as patient is [employer]     Schatzki's ring 09/14/2015    Seen on outside EGD 05/2014, underwent esophageal dilatation. Bx were negative.     Seizures     Sleep stage dysfunction     Noted on PSG 06/2017; negative for obstructive sleep apnea     Stroke     Urinary tract infection associated with nephrostomy catheter 06/11/2020    Wound infection after surgery 09/24/2020       Past Surgical History:   Procedure Laterality Date    ANTEGRADE NEPHROSTOGRAPHY Bilateral 9/28/2020    Procedure: Nephrostogram - antegrade;  Surgeon: Leslie Balbuena MD;  Location: [facility] OR [address] [place] [address];  Service: Urology;  Laterality: Bilateral;    ANTEGRADE NEPHROSTOGRAPHY Left 4/20/2021    Procedure: NEPHROSTOGRAM, ANTEGRADE;  Surgeon: Leslie Balbuena MD;  Location: [facility] OR 1ST FLR;  Service: Urology;  Laterality: Left;    ANTEGRADE NEPHROSTOGRAPHY Right 10/27/2022    Procedure: NEPHROSTOGRAM, ANTEGRADE;  Surgeon: Chirag Russ MD;  Location: [facility] OR 1ST FLR;  Service: Urology;  Laterality: Right;    ANTEGRADE NEPHROSTOGRAPHY Right 4/21/2023    Procedure: NEPHROSTOGRAM, ANTEGRADE;  Surgeon: Chirag Russ MD;  Location: [facility] OR 2ND FLR;  Service: Urology;  Laterality: Right;    ANTEGRADE NEPHROSTOGRAPHY Left 6/6/2023    Procedure: Nephrostogram - antegrade;  Surgeon: Leslie Balbuena MD;  Location: [facility] OR 1ST FLR;  Service: Urology;  Laterality: Left;    BILATERAL OOPHORECTOMY  2015    CHOLECYSTECTOMY   11/09/2016    Done at [facility], path showed chronic cholecystitis and gallstones    cold knife conization  2014    COLECTOMY, RIGHT  9/17/2020    Procedure: COLECTOMY, RIGHT Extended;  Surgeon: Hammad Reynolds MD;  Location: [facility] OR 2ND FLR;  Service: Colon and Rectal;;    COLONOSCOPY  2014    COLONOSCOPY N/A 10/24/2016    at [facility], Dr Gutiérrez    COLONOSCOPY N/A 5/18/2018    Procedure: COLONOSCOPY;  Surgeon: Arden Gutiérrez MD;  Location: [facility] (4TH FLR);  Service: Endoscopy;  Laterality: N/A;    COLONOSCOPY N/A 7/28/2020    Procedure: COLONOSCOPY;  Surgeon: Hammad Reynolds MD;  Location: [facility] (4TH FLR);  Service: Colon and Rectal;  Laterality: N/A;  covid test [place] 7/25    CYSTOSCOPY WITH URETEROSCOPY, RETROGRADE PYELOGRAPHY, AND INSERTION OF STENT Bilateral 3/21/2020    Procedure: CYSTOSCOPY, WITH RETROGRADE PYELOGRAM,;  Surgeon: Leslie Balbuena MD;  Location: [facility] OR 1ST FLR;  Service: Urology;  Laterality: Bilateral;    DILATION OF NEPHROSTOMY TRACT Right 10/27/2022    Procedure: DILATION, NEPHROSTOMY TRACT;  Surgeon: Chirag Russ MD;  Location: [facility] OR 1ST FLR;  Service: Urology;  Laterality: Right;    ESOPHAGOGASTRODUODENOSCOPY  2014    ESOPHAGOGASTRODUODENOSCOPY  11/18/2020    ESOPHAGOGASTRODUODENOSCOPY N/A 11/18/2020    Procedure: ESOPHAGOGASTRODUODENOSCOPY (EGD);  Surgeon: Zenon Spencer MD;  Location: [facility];  Service: Endoscopy;  Laterality: N/A;    ESOPHAGOGASTRODUODENOSCOPY N/A 12/11/2020    Procedure: EGD (ESOPHAGOGASTRODUODENOSCOPY);  Surgeon: Juancho Muse MD;  Location: [facility] (2ND FLR);  Service: Endoscopy;  Laterality: N/A;    HYSTERECTOMY  2014    [facility] for cervical cancer    ILEOSTOMY  9/17/2020    Procedure: CREATION, ILEOSTOMY;  Surgeon: Hammad Reynolds MD;  Location: [facility] OR 2ND FLR;  Service: Colon and Rectal;;    LOOPOGRAM N/A 4/20/2021    Procedure: LOOPOGRAM;  Surgeon: Leslie Balbuena MD;  Location: [facility] OR [address] [place] [address];  Service: Urology;  Laterality: N/A;     LOOPOGRAM N/A 6/6/2023    Procedure: LOOPOGRAM;  Surgeon: Leslie Balbuena MD;  Location: [facility] OR 1ST FLR;  Service: Urology;  Laterality: N/A;    MOBILIZATION OF SPLENIC FLEXURE N/A 9/11/2020    Procedure: MOBILIZATION, COLONIC;  Surgeon: Hammad Reynolds MD;  Location: [facility] OR 2ND FLR;  Service: Colon and Rectal;  Laterality: N/A;    NEPHROSTOGRAPHY Bilateral 4/17/2021    Procedure: Nephrostogram;  Surgeon: Celeste Surgeon;  Location: [facility];  Service: Anesthesiology;  Laterality: Bilateral;    OOPHORECTOMY      PERCUTANEOUS NEPHROLITHOTOMY Right 4/21/2023    Procedure: NEPHROLITHOTOMY, PERCUTANEOUS;  Surgeon: Chirag Russ MD;  Location: [facility] OR 2ND FLR;  Service: Urology;  Laterality: Right;  2.5 hrs    PERCUTANEOUS NEPHROSTOMY  4/21/2023    Procedure: CREATION, NEPHROSTOMY, PERCUTANEOUS with removal of existing nephrostomy tube;  Surgeon: Chirag Russ MD;  [facility];  Service: Urology;;    PYELOSCOPY Right 10/27/2022    Procedure: PYELOSCOPY;  Surgeon: Chirag Russ MD;  [facility];  Service: Urology;  Laterality: Right;    REPLACEMENT OF NEPHROSTOMY TUBE Right 10/27/2022    Procedure: REPLACEMENT, NEPHROSTOMY TUBE;  Surgeon: Chirag Russ MD;  [facility];  Service: Urology;  Laterality: Right;    RETROPERITONEAL LYMPHADENECTOMY Right 9/17/2018    Procedure: LYMPHADENECTOMY, RETROPERITONEUM;  Surgeon: Sebas Reed MD;  Location: [facility] OR [facility] FLR;  Service: General;  Laterality: Right;  ILIAC    URETEROSCOPIC REMOVAL OF URETERIC CALCULUS Right 10/27/2022    Procedure: REMOVAL, CALCULUS, URETER, URETEROSCOPIC;  Surgeon: Chirag Russ MD;  Location: [facility] OR [facility] FLR;  Service: Urology;  Laterality: Right;    URETEROSCOPY Right 10/27/2022    Procedure: URETEROSCOPY-ANTEGRADE;  Surgeon: Chirag Russ MD;  Location: [facility] OR [facility] FLR;  Service: Urology;  Laterality: Right;       Review of patient's allergies indicates:   Allergen  Reactions    Bee sting [allergen ext-venom-honey bee]      Rash      Grass pollen-bermuda, standard      rash       No current facility-administered medications on file prior to encounter.     Current Outpatient Medications on File Prior to Encounter   Medication Sig    amoxicillin (AMOXIL) 875 MG tablet Take 1 tablet (875 mg total) by mouth every 12 (twelve) hours. for 10 days    gabapentin (NEURONTIN) 100 MG capsule Take 2 capsules (200 mg total) by mouth every evening.    loratadine (CLARITIN) 10 mg tablet Take 10 mg by mouth once daily.    mirtazapine (REMERON) 30 MG tablet Take 1 tablet (30 mg total) by mouth nightly.    oxyCODONE (ROXICODONE) 5 MG immediate release tablet Take 1 tablet (5 mg total) by mouth every 12 (twelve) hours as needed for Pain.    tolterodine (DETROL LA) 2 MG Cp24 Take 2 mg by mouth once daily.     Family History       Problem Relation (Age of Onset)    Breast cancer Maternal Aunt    Cancer Father, Mother    Cervical cancer Mother    Colon cancer Father    Drug abuse Daughter, Daughter    Esophageal cancer Father    Heart disease Sister, Maternal Grandmother    Suicide Daughter          Tobacco Use    Smoking status: Never    Smokeless tobacco: Never   Substance and Sexual Activity    Alcohol use: No     Alcohol/week: 0.0 standard drinks of alcohol    Drug use: No    Sexual activity: Yes     Partners: Male     Birth control/protection: None     Comment:  19 years since 1999     Review of Systems   Constitutional:  Positive for chills. Negative for activity change, diaphoresis, fatigue, fever and unexpected weight change.   HENT:  Negative for ear pain, sinus pressure, sinus pain and sore throat.    Eyes:  Negative for pain, redness and visual disturbance.   Respiratory:  Negative for cough, choking, chest tightness, shortness of breath, wheezing and stridor.    Cardiovascular:  Positive for chest pain. Negative for palpitations and leg swelling.   Gastrointestinal:  Positive for  abdominal pain, nausea and vomiting. Negative for blood in stool, constipation and diarrhea.        Patient has Ostomy bags with normal output. No reported blood or change in consistency    Genitourinary:         Patient has nephrostomy bag    Musculoskeletal:  Negative for back pain and neck stiffness.   Neurological:  Negative for dizziness, syncope, light-headedness and headaches.   Psychiatric/Behavioral:  Negative for agitation, behavioral problems, confusion and hallucinations.      Objective:     Vital Signs (Most Recent):  Temp: 98.1 °F (36.7 °C) (11/03/23 1915)  Pulse: 71 (11/04/23 0005)  Resp: 13 (11/04/23 0005)  BP: 123/71 (11/04/23 0005)  SpO2: 100 % (11/04/23 0005) Vital Signs (24h Range):  Temp:  [98.1 °F (36.7 °C)] 98.1 °F (36.7 °C)  Pulse:  [71-95] 71  Resp:  [13-18] 13  SpO2:  [99 %-100 %] 100 %  BP: (114-141)/(60-78) 123/71     Weight: 83.9 kg (185 lb)  Body mass index is 27.32 kg/m².     Physical Exam  Constitutional:       Appearance: She is obese. She is ill-appearing.   HENT:      Head: Normocephalic and atraumatic.      Nose: Nose normal. No congestion.      Mouth/Throat:      Mouth: Mucous membranes are moist.      Pharynx: Oropharynx is clear.   Eyes:      Extraocular Movements: Extraocular movements intact.   Cardiovascular:      Rate and Rhythm: Normal rate and regular rhythm.      Pulses: Normal pulses.      Heart sounds: Normal heart sounds. No murmur heard.     No friction rub.   Pulmonary:      Effort: Pulmonary effort is normal. No respiratory distress.      Breath sounds: Normal breath sounds. No wheezing.   Chest:      Chest wall: No tenderness.   Abdominal:      General: There is distension (Mild distension).      Palpations: Abdomen is soft.      Tenderness: There is abdominal tenderness. There is no guarding or rebound.      Comments: Patient has colostomy bag with normal output and ileostomy bag with normal output. She also has a mid abdominal scar that seems to be well healed.    Genitourinary:     Comments: Left nephrostomy bag with normal urine output no discoloration. Tube seems to be in place with no signs of leakage or erythema   Musculoskeletal:         General: Normal range of motion.      Cervical back: No rigidity.   Skin:     General: Skin is warm.   Neurological:      General: No focal deficit present.      Mental Status: She is alert and oriented to person, place, and time.   Psychiatric:         Mood and Affect: Mood normal.         Behavior: Behavior normal.                Significant Labs: All pertinent labs within the past 24 hours have been reviewed.    Significant Imaging: I have reviewed all pertinent imaging results/findings within the past 24 hours.  Assessment/Plan:     * Generalized abdominal pain  Ms. Riley is a 59 y/o female with a PMH of b/t ureteral sticture secondary to radiation for cervical cancer, s/p open transverse colon conduit urinary diversion on 9/11/2020, MDD, b/t nephrostomy tubes ( right side tube has been removed, left was left in place), multiple episodes of pyelonephritis since August 2023 presenting to the ED due to abdominal pain, nausea, and vomiting that started today around 6 PM. Patient reports that she was going about her regular day and a sharp diffuse abdominal pain started around the afternoon. She reports that the pain became associated with nausea and severe vomiting (Yellow vomit).  She reports normal output from her ostomy bags and nephrostomy bags. She reports that no blood was apparent in the bags and nothing seems to have changes in regards to the output these past few days.  She denies any headaches, chest pain, SOB, hematemesis, flank pain, and blurry vision. Patient reports that she has not changed her diet recently. She also reports that she doesn't take any medication except for Amoxicillin which she was sent home with after recently being discharged from the hospital. She denies any sick contacts as well.     In the  "ED, patient is afebrile and HDS. WBC is 11.28. Hgb is 12.9. Lipase is wnl at 29. Lactic acid is wnl at 1.8. AST/A:T is wnl at 20/16. UA is positive for 3+ leukocytes,nitrites, many bacteria, and WBC >100. CT demonstrates mildly dilated loops from possible infectious/inflammatory source or pSBO. General surgery evaluated her in the ED and recommended no acute surgical intervention recommended NGT placement, NPO status, and mIVF. Abdominal exam is benign for acute abdomen. CT A/P reveals "Few mildly dilated loops of small bowel anteriorly in the mid abdomen with a few minimal air-fluid levels also.  Angulation of adjacent bowel with small bowel wall thickening and nondistention could be associated with inflammatory or infectious process and possible partial obstruction. EKG in ED revealed QTC of 430.     Continue to monitor and assess need for infectious workup. Nausea/Vomiting/Abdominal pain likely 2/2 to suspected partial obstruction. Will start patient on Cipro and Flagyl in light of suspected infectious cause but more likely partial SBO.     PLAN:  - General surgery consulted, appreciate recs  - NGT placed for decompression  - Cipro and Flagyl for intraabdominal coverage for suspected infectious cause of dilatation    - NPO   - Continuous IVF, NS @ 100 cc/hr for 12 hours. Restart fluids once done if needed   - CBC, CMP, Mg, Phos  - Anticipate likely gastrograffin challenge after period of decompression per gen surg recs  - prn zofran for nausea, monitor QTC with daily EKG's if needed     UTI (urinary tract infection)  Patient admits to abdominal pain that had sudden onset today likely 2/2 to pSBO.  Patient recently went to the ED for on 10/21/23. Patient reports that she was found to have a UTI, micro shows E. Faecalis. She was admitted to the hospital on 10/26/23 for left nephrostomy tube getting dislodged. She was treated with Ampicillin initially based on susceptibility results for E. Faecalis and transitioned " to Amoxicillin to finish out a 14 day total course. Patient presents 11/4/23 with UA revealing UTI, she is currently on day 8 out of 14. In regards to her UTI, unclear source from where collection is occurring. Whether it is ileostomy conduit or Nephrostomy bag. Due to this reordered UA with instructions to collect from nephrostomy bag after being emptied. UA was nitrite +, and leukocyte +. Unclear if source of urine collection on 10/21/23 was from nephrostomy bag, might have been from ileostomy which would be in picture of E. Faecalis.      PLAN:   - F/u with UA with clear instructions to collect from nephrostomy bag after emptying the bag   - Cipro and Flagyl for Gram - and anaerobic coverage of suspected intraabdominal infection     Major depressive disorder, recurrent episode, moderate  Patient has persistent depression which is severe and is currently controlled. Will hold anti-depressant medications (Mirtazapine) . We will not consult psychiatry at this time. Patient does not display psychosis at this time. Continue to monitor closely and adjust plan of care as needed. Patient reports not taking any medications recently except for her Amoxicillin which was prescribed for her UTI.    PLAN:  -Hold patient medications in setting of NPO status and unclear home regimen  -Continue patient meds and get clearer understanding of what she is taking once able to tolerate PO intake     History of essential hypertension  Patient reports hx of HTN. In the ED, pressures are wnl. She reports she is not on any anti-HTN medication at home      PLAN:   - Continue to monitor patient BP, consider starting prn anti-hypertensive if BP elevated        VTE Risk Mitigation (From admission, onward)           Ordered     enoxaparin injection 40 mg  Daily         11/04/23 0221     IP VTE HIGH RISK PATIENT  Once         11/04/23 0221     Place sequential compression device  Until discontinued         11/04/23 0221                                    Jai Gonzalez DO  Department of Hospital Medicine  [facility] ([place]

## 2023-11-04 NOTE — ASSESSMENT & PLAN NOTE
"Ms. Riley is a 59 y/o female with a PMH of b/t ureteral sticture secondary to radiation for cervical cancer, s/p open transverse colon conduit urinary diversion on 9/11/2020, MDD, b/t nephrostomy tubes ( right side tube has been removed, left was left in place), multiple episodes of pyelonephritis since August 2023 presenting to the ED due to abdominal pain, nausea, and vomiting that started today around 6 PM. Patient reports that she was going about her regular day and a sharp diffuse abdominal pain started around the afternoon. She reports that the pain became associated with nausea and severe vomiting (Yellow vomit).  She reports normal output from her ostomy bags and nephrostomy bags. She reports that no blood was apparent in the bags and nothing seems to have changes in regards to the output these past few days.  She denies any headaches, chest pain, SOB, hematemesis, flank pain, and blurry vision. Patient reports that she has not changed her diet recently. She also reports that she doesn't take any medication except for Amoxicillin which she was sent home with after recently being discharged from the hospital. She denies any sick contacts as well.     In the ED, patient is afebrile and HDS. WBC is 11.28. Hgb is 12.9. Lipase is wnl at 29. Lactic acid is wnl at 1.8. AST/A:T is wnl at 20/16. UA is positive for 3+ leukocytes,nitrites, many bacteria, and WBC >100. CT demonstrates mildly dilated loops from possible infectious/inflammatory source or pSBO. General surgery evaluated her in the ED and recommended no acute surgical intervention recommended NGT placement, NPO status, and mIVF. Abdominal exam is benign for acute abdomen. CT A/P reveals "Few mildly dilated loops of small bowel anteriorly in the mid abdomen with a few minimal air-fluid levels also.  Angulation of adjacent bowel with small bowel wall thickening and nondistention could be associated with inflammatory or infectious process and possible " partial obstruction. EKG in ED revealed QTC of 430.     Continue to monitor and assess need for infectious workup. Nausea/Vomiting/Abdominal pain likely 2/2 to suspected partial obstruction. Will start patient on Cipro and Flagyl in light of suspected infectious cause but more likely partial SBO.     PLAN:  - General surgery consulted, appreciate recs  - NGT placed for decompression  - Cipro and Flagyl for intraabdominal coverage for suspected infectious cause of dilatation    - NPO   - Continuous IVF, NS @ 100 cc/hr for 12 hours. Restart fluids once done if needed   - CBC, CMP, Mg, Phos  - Anticipate likely gastrograffin challenge after period of decompression per gen surg recs  - prn zofran for nausea, monitor QTC with daily EKG's if needed

## 2023-11-04 NOTE — CONSULTS
"[facility] - [facility] Dept  General Surgery  Consult Note    Patient Name: Edita Riley  MRN: [id number]  Code Status: Prior  Admission Date: 11/3/2023  Hospital Length of Stay: 0 days  Attending Physician: Philip Calderon DO  Primary Care Provider: Trina Vu MD    Patient information was obtained from patient, past medical records and ER records.     Inpatient consult to General surgery  Consult performed by: Oj Hercules MD  Consult ordered by: Gissel Mason MD        Subjective:     Principal Problem: <principal problem not specified>    History of Present Illness: Edita Riley is a 58 y.o. 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 This onset around 6 pm yesterday. It is located diffusely. She notes nausea with multiple episodes of emesis prior to arrival. She has had "normal" ileostomy output, not sure of volume. She has also had two recent admissions in Sep 2023 for abdominal pain and Oct 2023 for a dislodged neph tube requiring exchange.   She is afebrile and hemodynamically stable. Labs demonstrate WBC and lactate within normal limits, UA consistent with UTI, remainder of labs relatively unremarkable. CT demonstrates mildly dilated loops from possible infectious/inflammatory source or pSBO.         No current facility-administered medications on file prior to encounter.     Current Outpatient Medications on File Prior to Encounter   Medication Sig    amoxicillin (AMOXIL) 875 MG tablet Take 1 tablet (875 mg total) by mouth every 12 (twelve) hours. for 10 days    gabapentin (NEURONTIN) 100 MG capsule Take 2 capsules " (200 mg total) by mouth every evening.    loratadine (CLARITIN) 10 mg tablet Take 10 mg by mouth once daily.    mirtazapine (REMERON) 30 MG tablet Take 1 tablet (30 mg total) by mouth nightly.    oxyCODONE (ROXICODONE) 5 MG immediate release tablet Take 1 tablet (5 mg total) by mouth every 12 (twelve) hours as needed for Pain.    tolterodine (DETROL LA) 2 MG Cp24 Take 2 mg by mouth once daily.       Review of patient's allergies indicates:   Allergen Reactions    Bee sting [allergen ext-venom-honey bee]      Rash      Grass pollen-bermuda, standard      rash       Past Medical History:   Diagnosis Date    Abnormal mammogram 08/25/2020    Acute blood loss anemia     Acute deep vein thrombosis (DVT) of lower extremity 12/09/2020    Advance care planning 04/30/2021    Anemia due to chronic blood loss     Anemia due to chronic kidney disease     Anxiety     Bilateral ureteral obstruction 9/11/2020    Cardiovascular event risk -- low 09/14/2015    ASCVD 10-year risk 1.9% (with optimal risk factors 1.3%) as of 9/14/2015     Cervical cancer 2014    Chronic back pain     Colostomy care     Deep vein thrombosis     Depression     Diarrhea due to malabsorption 11/14/2018    Difficult intubation     Disorder of kidney and ureter     DVT of lower extremity, bilateral 11/04/2020    Emphysema of lung 4/10/2023    Fibromyalgia     Fungemia 09/27/2020    Generalized abdominal pain 08/25/2020    GERD (gastroesophageal reflux disease)     Hemifacial spasm 09/16/2015    Hiatal hernia 2014    History of cervical cancer 10/11/2018    Hx of psychiatric care     Cymbalta, trazodone    Hypertension     Hypomagnesemia 11/21/2018    Lactose intolerance     Metastatic squamous cell carcinoma to lymph node 10/11/2018    Neuropathy due to chemotherapeutic drug 11/14/2018    Osteoarthritis of back     Peritonitis 09/22/2020    Pseudomonas urinary tract infection 04/21/2021    Psychiatric problem      Refusal of blood transfusions as patient is [employer]     Schatzki's ring 09/14/2015    Seen on outside EGD 05/2014, underwent esophageal dilatation. Bx were negative.     Seizures     Sleep stage dysfunction     Noted on PSG 06/2017; negative for obstructive sleep apnea     Stroke     Urinary tract infection associated with nephrostomy catheter 06/11/2020    Wound infection after surgery 09/24/2020     Past Surgical History:   Procedure Laterality Date    ANTEGRADE NEPHROSTOGRAPHY Bilateral 9/28/2020    Procedure: Nephrostogram - antegrade;  Surgeon: Leslie Balbuena MD;  Location: [facility] OR 1ST FLR;  Service: Urology;  Laterality: Bilateral;    ANTEGRADE NEPHROSTOGRAPHY Left 4/20/2021    Procedure: NEPHROSTOGRAM, ANTEGRADE;  Surgeon: Leslie Balbuena MD;  Location: [facility] OR 1ST FLR;  Service: Urology;  Laterality: Left;    ANTEGRADE NEPHROSTOGRAPHY Right 10/27/2022    Procedure: NEPHROSTOGRAM, ANTEGRADE;  Surgeon: Chirag Russ MD;  Location: [facility] OR 1ST FLR;  Service: Urology;  Laterality: Right;    ANTEGRADE NEPHROSTOGRAPHY Right 4/21/2023    Procedure: NEPHROSTOGRAM, ANTEGRADE;  Surgeon: Chirag Russ MD;  Location: [facility] OR 2ND FLR;  Service: Urology;  Laterality: Right;    ANTEGRADE NEPHROSTOGRAPHY Left 6/6/2023    Procedure: Nephrostogram - antegrade;  Surgeon: Leslie Balbuena MD;  Location: [facility] OR 1ST FLR;  Service: Urology;  Laterality: Left;    BILATERAL OOPHORECTOMY  2015    CHOLECYSTECTOMY  11/09/2016    Done at [facility], path showed chronic cholecystitis and gallstones    cold knife conization  2014    COLECTOMY, RIGHT  9/17/2020    Procedure: COLECTOMY, RIGHT Extended;  Surgeon: Hammad Reynolds MD;  Location: [facility] OR 2ND FLR;  Service: Colon and Rectal;;    COLONOSCOPY  2014    COLONOSCOPY N/A 10/24/2016    at [facility], Dr Thomas    COLONOSCOPY N/A 5/18/2018    Procedure: COLONOSCOPY;  Surgeon: Arden Gutiérrez MD;  Location: [facility] ENDO (4TH FLR);  Service: Endoscopy;   Laterality: N/A;    COLONOSCOPY N/A 7/28/2020    Procedure: COLONOSCOPY;  Surgeon: Hammad Reynolds MD;  Location: [facility] ENDO (4TH FLR);  Service: Colon and Rectal;  Laterality: N/A;  covid test [place] 7/25    CYSTOSCOPY WITH URETEROSCOPY, RETROGRADE PYELOGRAPHY, AND INSERTION OF STENT Bilateral 3/21/2020    Procedure: CYSTOSCOPY, WITH RETROGRADE PYELOGRAM,;  Surgeon: Leslie Balbuena MD;  Location: [facility] OR 1ST FLR;  Service: Urology;  Laterality: Bilateral;    DILATION OF NEPHROSTOMY TRACT Right 10/27/2022    Procedure: DILATION, NEPHROSTOMY TRACT;  Surgeon: Chirag Russ MD;  Location: [facility] OR 1ST FLR;  Service: Urology;  Laterality: Right;    ESOPHAGOGASTRODUODENOSCOPY  2014    ESOPHAGOGASTRODUODENOSCOPY  11/18/2020    ESOPHAGOGASTRODUODENOSCOPY N/A 11/18/2020    Procedure: ESOPHAGOGASTRODUODENOSCOPY (EGD);  Surgeon: Zenon Spencer MD;  Location: [facility];  Service: Endoscopy;  Laterality: N/A;    ESOPHAGOGASTRODUODENOSCOPY N/A 12/11/2020    Procedure: EGD (ESOPHAGOGASTRODUODENOSCOPY);  Surgeon: Juancho Muse MD;  Location: [facility] ENDO (2ND FLR);  Service: Endoscopy;  Laterality: N/A;    HYSTERECTOMY  2014    [facility] for cervical cancer    ILEOSTOMY  9/17/2020    Procedure: CREATION, ILEOSTOMY;  Surgeon: Hammad Reynolds MD;  Location: [facility] OR 2ND FLR;  Service: Colon and Rectal;;    LOOPOGRAM N/A 4/20/2021    Procedure: LOOPOGRAM;  Surgeon: Leslie Balbuena MD;  Location: [facility] OR 1ST FLR;  Service: Urology;  Laterality: N/A;    LOOPOGRAM N/A 6/6/2023    Procedure: LOOPOGRAM;  Surgeon: Leslie Balbuena MD;  Location: [facility] OR 1ST FLR;  Service: Urology;  Laterality: N/A;    MOBILIZATION OF SPLENIC FLEXURE N/A 9/11/2020    Procedure: MOBILIZATION, COLONIC;  Surgeon: Hammad Reynolds MD;  Location: [facility] OR 2ND FLR;  Service: Colon and Rectal;  Laterality: N/A;    NEPHROSTOGRAPHY Bilateral 4/17/2021    Procedure: Nephrostogram;  Surgeon: Celeste Surgeon;  Location: [facility];  Service:  Anesthesiology;  Laterality: Bilateral;    OOPHORECTOMY      PERCUTANEOUS NEPHROLITHOTOMY Right 4/21/2023    Procedure: NEPHROLITHOTOMY, PERCUTANEOUS;  Surgeon: Chirag Russ MD;  Location: [facility] OR 2ND FLR;  Service: Urology;  Laterality: Right;  2.5 hrs    PERCUTANEOUS NEPHROSTOMY  4/21/2023    Procedure: CREATION, NEPHROSTOMY, PERCUTANEOUS with removal of existing nephrostomy tube;  Surgeon: Chirag Russ MD;  [facility];  Service: Urology;;    PYELOSCOPY Right 10/27/2022    Procedure: PYELOSCOPY;  Surgeon: Chirag Russ MD;  Location: [facility] OR [facility] FLR;  Service: Urology;  Laterality: Right;    REPLACEMENT OF NEPHROSTOMY TUBE Right 10/27/2022    Procedure: REPLACEMENT, NEPHROSTOMY TUBE;  Surgeon: Chirag Russ MD;  [facility];  Service: Urology;  Laterality: Right;    RETROPERITONEAL LYMPHADENECTOMY Right 9/17/2018    Procedure: LYMPHADENECTOMY, RETROPERITONEUM;  Surgeon: Sebas Reed MD;  [facility];  Service: General;  Laterality: Right;  ILIAC    URETEROSCOPIC REMOVAL OF URETERIC CALCULUS Right 10/27/2022    Procedure: REMOVAL, CALCULUS, URETER, URETEROSCOPIC;  Surgeon: Chirag Russ MD;  [facility];  Service: Urology;  Laterality: Right;    URETEROSCOPY Right 10/27/2022    Procedure: URETEROSCOPY-ANTEGRADE;  Surgeon: Chirag Russ MD;  Location: [facility] OR [address] [place] [address];  Service: Urology;  Laterality: Right;     Family History       Problem Relation (Age of Onset)    Breast cancer Maternal Aunt    Cancer Father, Mother    Cervical cancer Mother    Colon cancer Father    Drug abuse Daughter, Daughter    Esophageal cancer Father    Heart disease Sister, Maternal Grandmother    Suicide Daughter          Tobacco Use    Smoking status: Never    Smokeless tobacco: Never   Substance and Sexual Activity    Alcohol use: No     Alcohol/week: 0.0 standard drinks of alcohol    Drug use: No    Sexual activity: Yes      Partners: Male     Birth control/protection: None     Comment:  19 years since 1999     Review of Systems   Constitutional:  Negative for fever.   Gastrointestinal:  Positive for abdominal pain, nausea and vomiting. Negative for blood in stool, constipation and diarrhea.     Objective:     Vital Signs (Most Recent):  Temp: 98.1 °F (36.7 °C) (11/03/23 1915)  Pulse: 71 (11/04/23 0005)  Resp: 13 (11/04/23 0005)  BP: 123/71 (11/04/23 0005)  SpO2: 100 % (11/04/23 0005) Vital Signs (24h Range):  Temp:  [98.1 °F (36.7 °C)] 98.1 °F (36.7 °C)  Pulse:  [71-95] 71  Resp:  [13-18] 13  SpO2:  [99 %-100 %] 100 %  BP: (114-141)/(60-78) 123/71     Weight: 83.9 kg (185 lb)  Body mass index is 27.32 kg/m².     Physical Exam  Vitals reviewed.   Constitutional:       General: She is not in acute distress.     Appearance: She is well-developed.   HENT:      Head: Normocephalic and atraumatic.   Eyes:      General:         Right eye: No discharge.         Left eye: No discharge.      Conjunctiva/sclera: Conjunctivae normal.   Cardiovascular:      Rate and Rhythm: Normal rate and regular rhythm.   Pulmonary:      Effort: Pulmonary effort is normal. No respiratory distress.   Abdominal:      General: There is no distension.      Palpations: Abdomen is soft.      Tenderness: There is no abdominal tenderness. There is no guarding or rebound.      Comments: Abdomen is soft and non-tender, urinary conduit in LLQ with light yellow urine present, ileostomy in R mid abdomen with both formed and liquid stool   Musculoskeletal:         General: No deformity. Normal range of motion.      Cervical back: Normal range of motion.   Skin:     General: Skin is warm and dry.   Neurological:      Mental Status: She is alert and oriented to person, place, and time.   Psychiatric:         Behavior: Behavior normal.            I have reviewed all pertinent lab results within the past 24 hours.  CBC:   Recent Labs   Lab 11/03/23 2058   WBC 11.28   RBC  4.69   HGB 12.9   HCT 44.4      MCV 95   MCH 27.5   MCHC 29.1*     CMP:   Recent Labs   Lab 11/03/23 2058   *   CALCIUM 10.4   ALBUMIN 3.5   PROT 8.7*      K 4.2   CO2 17*      BUN 22*   CREATININE 1.3   ALKPHOS 133   ALT 16   AST 20   BILITOT 0.2       Significant Diagnostics:  I have reviewed all pertinent imaging results/findings within the past 24 hours.    Imaging Results               CT Abdomen Pelvis With IV Contrast (Final result)  Result time 11/03/23 23:49:53      Final result by Adilson Gerard MD (11/03/23 23:49:53)                   Impression:      1. Few mildly dilated loops of small bowel anteriorly in the mid abdomen with a few minimal air-fluid levels also.  Angulation of adjacent bowel with small bowel wall thickening and nondistention could be associated with inflammatory or infectious process and possible partial obstruction.  See above comments.  Follow-up recommended.  2. Hepatomegaly.  3. Status post cholecystectomy with mild dilation of the biliary ducts.  See above comments.  4. Postoperative changes of the urinary system with moderate hydronephrosis on the right in minimal hydronephrosis on the left.  Percutaneous nephrostomy tube on the left with ureteral diversion procedure and left anterior pelvis urostomy.  This is similar to the prior study.  5. Prior partial colectomy and right lower quadrant ileostomy.  6. This report was flagged in Epic as abnormal.      Electronically signed by: Adilson Gerard  Date:    11/03/2023  Time:    23:49               Narrative:    EXAMINATION:  CT ABDOMEN PELVIS WITH IV CONTRAST    CLINICAL HISTORY:  Abdominal pain, acute, nonlocalized;    TECHNIQUE:  Low dose axial images, sagittal and coronal reformations were obtained from the lung bases to the pubic symphysis following the IV administration of 100 mL of Omnipaque 350 .  Oral contrast was not administered.    COMPARISON:  10/21/2023    FINDINGS:  Abdomen:    - Lower  thorax:    - Lung bases: No infiltrates and no nodules.    - Liver: Liver is mildly enlarged.  No focal abnormality.    - Gallbladder: Status post cholecystectomy.    - Bile Ducts: Mild dilation the common duct 11 mm.  Minimal dilation of the left intrahepatic biliary ducts.    - Spleen: Negative.    - Kidneys: Percutaneous nephrostomy tube on the left is unchanged.  Probable minimal hydronephrosis on the left.    Stable moderate hydronephrosis on the right.  Bilateral ureteral diversion procedure with urostomy extending to the left anterior pelvis.    Small cyst at the lower pole the left kidney.    - Adrenals: Unremarkable.    - Pancreas: No mass or peripancreatic fat stranding.    - Retroperitoneum:  No significant adenopathy.    - Vascular: No abdominal aortic aneurysm.    - Abdominal wall:  Ileostomy in the right lower quadrant.    Pelvis:    Urinary bladder is nondistended with limited characterization.    Bowel/Mesentery:    The postop changes of prior partial colectomy on the right.    Few dilated loops of small bowel anteriorly in the mid abdomen measuring approximately 3.1 cm on axial 70 in 3.4 cm on coronal 100.    The small bowel reverses course with 180 degree bend near axial 74-80 with mild small bowel wall thickening and nondistention distally could represent inflammatory or infectious process with possible partial obstruction.  Recommend follow-up    Bones:  No acute osseous abnormality and no suspicious lytic or blastic lesion.                                          Assessment/Plan:     Generalized abdominal pain  58 y.o. 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 Abdominal exam is benign and labs reassuring, CT with some dilated small bowel but no patient has also had her normal amount of ileostomy output    - No acute surgical intervention recommend  - Recommend NGT placement  - Anticipate likely gastrograffin challenge after period of decompression  - NPO  - mIVF  - Please call with questions      VTE Risk Mitigation (From admission, onward)    None            Oj Hercules MD  General Surgery  [facility]

## 2023-11-04 NOTE — HPI
Ms. Riley is a 59 y/o female with a PMH of b/t ureteral sticture secondary to radiation for cervical cancer, s/p open transverse colon conduit urinary diversion on 9/11/2020, MDD, b/t nephrostomy tubes ( right side tube has been removed, left was left in place), multiple episodes of pyelonephritis since August 2023 presenting to the ED due to abdominal pain, nausea, and vomiting that started today around 6 PM. Patient reports that she was going about her regular day and a sharp diffuse abdominal pain started around the afternoon. She reports that the pain became associated with nausea and severe vomiting (Yellow vomit). Patient reports that before it started today, she had no symptoms the day prior. She reports normal output from her ostomy bags and nephrostomy bags. She reports that no blood was apparent in the bags and nothing seems to have changes in regards to the output these past few days.  She denies any headaches, chest pain, SOB, hematemesis, flank pain, and blurry vision. Patient reports that she has not changed her diet recently. She also reports that she doesn't take any medication except for Amoxicillin which she was sent home with after recently being discharged from the hospital. She denies any sick contacts as well.     Patient was recently admitted to the hospital on 10/26/23 due to further management of a displaced left nephrostomy tube. Urology evaluated the patient on admission and recommended IR consult for left nephrostomy tube placement. Interventional radiology placed a percutaneous nephrostomy tube on 10/26. She had a slight ODESSA which was treated with IV fluids and monitored. Her urinary tract infection ( E. Faecalis)  which was found on 10/21/23  was being treated while she was admitted on 10/26/23. She was transitioned from IV ampicillin to PO Amoxicillin regimen upon discharge with instructions to take medication for 10 more days to complete a total of 14 day course. She was  nauseated while taking oral antibiotics which improved with oral antiemetics.     In the ED, patient is afebrile and HDS. WBC is 11.28. Hgb is 12.9. Lipase is wnl at 29. Lactic acid is wnl at 1.8. AST/ALT is wnl at 20/16. UA is positive for 3+ leukocytes,nitrites, many bacteria, and WBC >100. CT demonstrates mildly dilated loops from possible infectious/inflammatory source or pSBO. General surgery evaluated her in the ED and recommended No acute surgical intervention recommended NGT placement, NPO status, and mIVF.

## 2023-11-04 NOTE — ASSESSMENT & PLAN NOTE
Patient has persistent depression which is severe and is currently controlled. Will hold anti-depressant medications (Mirtazapine) . We will not consult psychiatry at this time. Patient does not display psychosis at this time. Continue to monitor closely and adjust plan of care as needed. Patient reports not taking any medications recently except for her Amoxicillin which was prescribed for her UTI.    PLAN:  -Hold patient medications in setting of NPO status and unclear home regimen  -Continue patient meds and get clearer understanding of what she is taking once able to tolerate PO intake

## 2023-11-04 NOTE — NURSING
Nurses Note -- 4 Eyes      11/4/2023   7:03 AM      Skin assessed during: Admit      [] No Altered Skin Integrity Present    []Prevention Measures Documented      [x] Yes- Altered Skin Integrity Present or Discovered   [] LDA Added if Not in Epic (Describe Wound)   [x] New Altered Skin Integrity was Present on Admit and Documented in LDA   [] Wound Image Taken    Wound Care Consulted? Yes    Attending Nurse:  Hailey Thompson RN    Second RN/Staff Member:  Satya Vu RN

## 2023-11-04 NOTE — PLAN OF CARE
Problem: Adult Inpatient Plan of Care  Goal: Plan of Care Review  11/4/2023 0801 by Hailey Thompson RN  Outcome: Ongoing, Progressing  11/4/2023 0755 by Hailey Thompson RN  Outcome: Ongoing, Progressing  Goal: Patient-Specific Goal (Individualized)  11/4/2023 0801 by Hailey Thompson RN  Outcome: Ongoing, Progressing  11/4/2023 0755 by Hailey Thompson RN  Outcome: Ongoing, Progressing  Goal: Absence of Hospital-Acquired Illness or Injury  11/4/2023 0801 by Hailey Thompson RN  Outcome: Ongoing, Progressing  11/4/2023 0755 by Hailey Thompson RN  Outcome: Ongoing, Progressing  Goal: Optimal Comfort and Wellbeing  11/4/2023 0801 by Hailey Thompson RN  Outcome: Ongoing, Progressing  11/4/2023 0755 by Hailey Thompson RN  Outcome: Ongoing, Progressing  Goal: Readiness for Transition of Care  11/4/2023 0801 by Hailey Thompson RN  Outcome: Ongoing, Progressing  11/4/2023 0755 by Hailey Thompson RN  Outcome: Ongoing, Progressing     Problem: Fluid and Electrolyte Imbalance (Acute Kidney Injury/Impairment)  Goal: Fluid and Electrolyte Balance  11/4/2023 0801 by Hailey Thompson RN  Outcome: Ongoing, Progressing  11/4/2023 0755 by Hailey Thompson RN  Outcome: Ongoing, Progressing     Problem: Oral Intake Inadequate (Acute Kidney Injury/Impairment)  Goal: Optimal Nutrition Intake  11/4/2023 0801 by Hailey Thompson RN  Outcome: Ongoing, Progressing  11/4/2023 0755 by Hailey Thompson RN  Outcome: Ongoing, Progressing     Problem: Renal Function Impairment (Acute Kidney Injury/Impairment)  Goal: Effective Renal Function  11/4/2023 0801 by Hailey Thompson RN  Outcome: Ongoing, Progressing  11/4/2023 0755 by Hailey Thompson RN  Outcome: Ongoing, Progressing     Problem: Skin Injury Risk Increased  Goal: Skin Health and Integrity  11/4/2023 0801 by Hailey Thompson RN  Outcome: Ongoing, Progressing  11/4/2023 0755 by Hailey Thompson RN  Outcome: Ongoing, Progressing     Problem: Impaired Wound  Healing  Goal: Optimal Wound Healing  11/4/2023 0801 by Hailey Thompson, RN  Outcome: Ongoing, Progressing  11/4/2023 0755 by Hailey Thompson, RN  Outcome: Ongoing, Progressing

## 2023-11-04 NOTE — PROVIDER PROGRESS NOTES - EMERGENCY DEPT.
Signout Note    I received signout from the previous provider.     Chief complaint:  Abdominal Pain (Generalized abd pain with N/V x1 day. Hx of cervical cancer)      Pertinent history &exam:  Edita Riley is a 58 y.o. female with pertinent PMH of prior transverse colon urinary conduit, right hemicolectomy, ileostomy creation, complications due to cervical cancer, who presented to emergency department with complaint of abdominal pain.  Patient also with history of prior small-bowel obstruction in August of this year, which resolved in a few days.  She was here with abdominal pain, vomiting.  She is already on antibiotics for a supposed urinary tract infection that was obtained from her urostomy.  She received morphine, Zofran, and IV fluids.  Her labs are reassuring.  Signed out pending CT abdomen pelvis for final disposition.    Vitals:    11/04/23 0005   BP: 123/71   Pulse: 71   Resp: 13   Temp:        Imaging Studies:    XR NG/OG tube placement check, non-radiologist performed   Final Result      Enteric tube is noted, the distal tip is subdiaphragmatic.         Electronically signed by: Edinson Moreno   Date:    11/04/2023   Time:    02:20      CT Abdomen Pelvis With IV Contrast   Final Result   Abnormal      1. Few mildly dilated loops of small bowel anteriorly in the mid abdomen with a few minimal air-fluid levels also.  Angulation of adjacent bowel with small bowel wall thickening and nondistention could be associated with inflammatory or infectious process and possible partial obstruction.  See above comments.  Follow-up recommended.   2. Hepatomegaly.   3. Status post cholecystectomy with mild dilation of the biliary ducts.  See above comments.   4. Postoperative changes of the urinary system with moderate hydronephrosis on the right in minimal hydronephrosis on the left.  Percutaneous nephrostomy tube on the left with ureteral diversion procedure and left anterior pelvis urostomy.  This is  similar to the prior study.   5. Prior partial colectomy and right lower quadrant ileostomy.   6. This report was flagged in Epic as abnormal.         Electronically signed by: Adilson Reinoso   Date:    11/03/2023   Time:    23:49          Medications Given:  Medications   sodium chloride 0.9% flush 10 mL (has no administration in time range)   naloxone 0.4 mg/mL injection 0.02 mg (has no administration in time range)   glucose chewable tablet 16 g (has no administration in time range)   glucose chewable tablet 24 g (has no administration in time range)   glucagon (human recombinant) injection 1 mg (has no administration in time range)   enoxaparin injection 40 mg (has no administration in time range)   dextrose 10% bolus 125 mL 125 mL (has no administration in time range)   dextrose 10% bolus 250 mL 250 mL (has no administration in time range)   ciprofloxacin (CIPRO)400mg/200ml D5W IVPB 400 mg (has no administration in time range)   metronidazole IVPB 500 mg (has no administration in time range)   0.9%  NaCl infusion (has no administration in time range)   morphine injection 6 mg (6 mg Intravenous Given 11/3/23 2109)   ondansetron injection 4 mg (4 mg Intravenous Given 11/3/23 2111)   lactated ringers bolus 1,000 mL (0 mLs Intravenous Stopped 11/3/23 2255)   iohexoL (OMNIPAQUE 350) injection 100 mL (100 mLs Intravenous Given 11/3/23 2306)       Pending Items/ MDM:  58 y.o. female with above complaint.  CT showing possible partial small-bowel obstruction, case discussed with General surgery, recommending NG-tube, and admission to Internal Medicine due to her comorbidities and medical complexity.  Case discussed with internal medicine.    Diagnostic Impression:    1. Small bowel obstruction    2. Vomiting    3. Encounter for imaging study to confirm nasogastric (NG) tube placement    4. Chest pain         ED Disposition Condition    Admit Stable                 Gissel Mason MD  Emergency Medicine

## 2023-11-04 NOTE — ED PROVIDER NOTES
Source of History  patient and EMR    Chief Complaint    Abdominal Pain (Generalized abd pain with N/V x1 day. Hx of cervical cancer)      History of Present Illness    Edita Riley is a 58 y.o. female presenting with concern for increasing abdominal pain and vomiting.  Onset at 6:00 p.m..  Patient has had poor p.o. intake over the course of the day.  Currently taking an antibiotic for urinary tract infection associated with her urostomy.  Patient has a significant history of cervical cancer but in remission, status post radiation, chemotherapy, and multiple surgical interventions.  She reports onset of chills, subjective fevers.  Did not take her temperature.  She has had normal output from her urostomy and ileostomy.  Nonbloody emesis.  Her past surgical history includes cholecystectomy, oophorectomy, hysterectomy, ileostomy, right colectomy, and nephrostomy.      Review of Systems    As per HPI and below:  Constitutional symptoms:  + fever or chills , positive generalized weakness  Skin symptoms:  No rash    Respiratory symptoms:  No shortness of breath  Cardiovascular symptoms:  No chest pain   Gastrointestinal symptoms:  + abdominal pain, + nausea,+ vomiting, no diarrhea, no increase in output from either ostomy  Genitourinary symptoms:  Normal urine output from ostomy  Musculoskeletal symptoms:  No back pain, No joint pains or aches    Neurologic symptoms:  No headache  Endocrine symptoms:  None except as in HPI  Psychiatric symptoms:  None except as in HPI     Past History    As per HPI and below:  Past Medical History:   Diagnosis Date    Abnormal mammogram 08/25/2020    Acute blood loss anemia     Acute deep vein thrombosis (DVT) of lower extremity 12/09/2020    Advance care planning 04/30/2021    Anemia due to chronic blood loss     Anemia due to chronic kidney disease     Anxiety     Bilateral ureteral obstruction 9/11/2020    Cardiovascular event risk -- low 09/14/2015    ASCVD 10-year risk  1.9% (with optimal risk factors 1.3%) as of 9/14/2015     Cervical cancer 2014    Chronic back pain     Colostomy care     Deep vein thrombosis     Depression     Diarrhea due to malabsorption 11/14/2018    Difficult intubation     Disorder of kidney and ureter     DVT of lower extremity, bilateral 11/04/2020    Emphysema of lung 4/10/2023    Fibromyalgia     Fungemia 09/27/2020    Generalized abdominal pain 08/25/2020    GERD (gastroesophageal reflux disease)     Hemifacial spasm 09/16/2015    Hiatal hernia 2014    History of cervical cancer 10/11/2018    Hx of psychiatric care     Cymbalta, trazodone    Hypertension     Hypomagnesemia 11/21/2018    Lactose intolerance     Metastatic squamous cell carcinoma to lymph node 10/11/2018    Neuropathy due to chemotherapeutic drug 11/14/2018    Osteoarthritis of back     Peritonitis 09/22/2020    Pseudomonas urinary tract infection 04/21/2021    Psychiatric problem     Refusal of blood transfusions as patient is Gnosticism     Schatzki's ring 09/14/2015    Seen on outside EGD 05/2014, underwent esophageal dilatation. Bx were negative.     Seizures     Sleep stage dysfunction     Noted on PSG 06/2017; negative for obstructive sleep apnea     Stroke     Urinary tract infection associated with nephrostomy catheter 06/11/2020    Wound infection after surgery 09/24/2020       Past Surgical History:   Procedure Laterality Date    ANTEGRADE NEPHROSTOGRAPHY Bilateral 9/28/2020    Procedure: Nephrostogram - antegrade;  Surgeon: Leslie Balbuena MD;  Location: [address];  Service: Urology;  Laterality: Bilateral;    ANTEGRADE NEPHROSTOGRAPHY Left 4/20/2021    Procedure: NEPHROSTOGRAM, ANTEGRADE;  Surgeon: Leslie Balbuena MD;  Location: [address];  Service: Urology;  Laterality: Left;    ANTEGRADE NEPHROSTOGRAPHY Right 10/27/2022    Procedure: NEPHROSTOGRAM, ANTEGRADE;  Surgeon: Chirag Russ MD;  Location: [address];  Service: Urology;  Laterality:  Right;    ANTEGRADE NEPHROSTOGRAPHY Right 4/21/2023    Procedure: NEPHROSTOGRAM, ANTEGRADE;  Surgeon: Chirag Russ MD;  Location: [facility] OR 2ND FLR;  Service: Urology;  Laterality: Right;    ANTEGRADE NEPHROSTOGRAPHY Left 6/6/2023    Procedure: Nephrostogram - antegrade;  Surgeon: Leslie Balbuena MD;  Location: [facility] OR 1ST FLR;  Service: Urology;  Laterality: Left;    BILATERAL OOPHORECTOMY  2015    CHOLECYSTECTOMY  11/09/2016    Done at [facility], path showed chronic cholecystitis and gallstones    cold knife conization  2014    COLECTOMY, RIGHT  9/17/2020    Procedure: COLECTOMY, RIGHT Extended;  Surgeon: Hammad Reynolds MD;  Location: [facility] OR 2ND FLR;  Service: Colon and Rectal;;    COLONOSCOPY  2014    COLONOSCOPY N/A 10/24/2016    at [facility], Dr Gutiérrez    COLONOSCOPY N/A 5/18/2018    Procedure: COLONOSCOPY;  Surgeon: Arden Gutiérrez MD;  Location: [facility] (4TH FLR);  Service: Endoscopy;  Laterality: N/A;    COLONOSCOPY N/A 7/28/2020    Procedure: COLONOSCOPY;  Surgeon: Hammad Reynolds MD;  Location: [facility] (4TH FLR);  Service: Colon and Rectal;  Laterality: N/A;  covid test [place] 7/25    CYSTOSCOPY WITH URETEROSCOPY, RETROGRADE PYELOGRAPHY, AND INSERTION OF STENT Bilateral 3/21/2020    Procedure: CYSTOSCOPY, WITH RETROGRADE PYELOGRAM,;  Surgeon: Leslie Balbuena MD;  [facility];  Service: Urology;  Laterality: Bilateral;    DILATION OF NEPHROSTOMY TRACT Right 10/27/2022    Procedure: DILATION, NEPHROSTOMY TRACT;  Surgeon: Chirag Russ MD;  Location: [facility] OR 1ST FLR;  Service: Urology;  Laterality: Right;    ESOPHAGOGASTRODUODENOSCOPY  2014    ESOPHAGOGASTRODUODENOSCOPY  11/18/2020    ESOPHAGOGASTRODUODENOSCOPY N/A 11/18/2020    Procedure: ESOPHAGOGASTRODUODENOSCOPY (EGD);  Surgeon: Zenon Spencer MD;  Location: [facility] ENDO;  Service: Endoscopy;  Laterality: N/A;    ESOPHAGOGASTRODUODENOSCOPY N/A 12/11/2020    Procedure: EGD (ESOPHAGOGASTRODUODENOSCOPY);  Surgeon: Juancho ROSARIO  MD Micha;  Location: [facility] ENDO (2ND FLR);  Service: Endoscopy;  Laterality: N/A;    HYSTERECTOMY  2014    [facility] for cervical cancer    ILEOSTOMY  9/17/2020    Procedure: CREATION, ILEOSTOMY;  Surgeon: Hammad Reynolds MD;  [facility]: [facility] OR 2ND FLR;  Service: Colon and Rectal;;    LOOPOGRAM N/A 4/20/2021    Procedure: LOOPOGRAM;  Surgeon: Leslie Balbuena MD;  Location: [facility] OR 1ST FLR;  Service: Urology;  Laterality: N/A;    LOOPOGRAM N/A 6/6/2023    Procedure: LOOPOGRAM;  Surgeon: Leslie Balbuena MD;  Location: [facility] OR 1ST FLR;  Service: Urology;  Laterality: N/A;    MOBILIZATION OF SPLENIC FLEXURE N/A 9/11/2020    Procedure: MOBILIZATION, COLONIC;  Surgeon: Hammad Reynolds MD;  Location: [facility] OR 2ND FLR;  Service: Colon and Rectal;  Laterality: N/A;    NEPHROSTOGRAPHY Bilateral 4/17/2021    Procedure: Nephrostogram;  Surgeon: Celeste Surgeon;  Location: [facility];  Service: Anesthesiology;  Laterality: Bilateral;    OOPHORECTOMY      PERCUTANEOUS NEPHROLITHOTOMY Right 4/21/2023    Procedure: NEPHROLITHOTOMY, PERCUTANEOUS;  Surgeon: Chirag Russ MD;  [facility];  Service: Urology;  Laterality: Right;  2.5 hrs    PERCUTANEOUS NEPHROSTOMY  4/21/2023    Procedure: CREATION, NEPHROSTOMY, PERCUTANEOUS with removal of existing nephrostomy tube;  Surgeon: Chirag Russ MD;  [facility];  Service: Urology;;    PYELOSCOPY Right 10/27/2022    Procedure: PYELOSCOPY;  Surgeon: Chirag Russ MD;  [facility];  Service: Urology;  Laterality: Right;    REPLACEMENT OF NEPHROSTOMY TUBE Right 10/27/2022    Procedure: REPLACEMENT, NEPHROSTOMY TUBE;  Surgeon: Chirag Russ MD;  [facility];  Service: Urology;  Laterality: Right;    RETROPERITONEAL LYMPHADENECTOMY Right 9/17/2018    Procedure: LYMPHADENECTOMY, RETROPERITONEUM;  Surgeon: Sebas Reed MD;  [facility];  Service: General;  Laterality: Right;  ILIAC    URETEROSCOPIC REMOVAL OF  URETERIC CALCULUS Right 10/27/2022    Procedure: REMOVAL, CALCULUS, URETER, URETEROSCOPIC;  Surgeon: Chirag Russ MD;  Location: [facility] OR [address] [place] [address];  Service: Urology;  Laterality: Right;    URETEROSCOPY Right 10/27/2022    Procedure: URETEROSCOPY-ANTEGRADE;  Surgeon: Chirag Russ MD;  Location: [facility] OR [address] [place] [address];  Service: Urology;  Laterality: Right;       Social History     Socioeconomic History    Marital status:      Spouse name: Hammad    Number of children: 2   Tobacco Use    Smoking status: Never    Smokeless tobacco: Never   Substance and Sexual Activity    Alcohol use: No     Alcohol/week: 0.0 standard drinks of alcohol    Drug use: No    Sexual activity: Yes     Partners: Male     Birth control/protection: None     Comment:  19 years since    Social History Narrative    , twin daughters (1  2018), disabled due to childhood stroke, Taoism sophia     Social Determinants of Health     Financial Resource Strain: Low Risk  (10/26/2023)    Overall Financial Resource Strain (CARDIA)     Difficulty of Paying Living Expenses: Not hard at all   Recent Concern: Financial Resource Strain - Medium Risk (2023)    Overall Financial Resource Strain (CARDIA)     Difficulty of Paying Living Expenses: Somewhat hard   Food Insecurity: No Food Insecurity (10/26/2023)    Hunger Vital Sign     Worried About Running Out of Food in the Last Year: Never true     Ran Out of Food in the Last Year: Never true   Transportation Needs: No Transportation Needs (10/26/2023)    PRAPARE - Transportation     Lack of Transportation (Medical): No     Lack of Transportation (Non-Medical): No   Physical Activity: Inactive (10/26/2023)    Exercise Vital Sign     Days of Exercise per Week: 0 days     Minutes of Exercise per Session: 0 min   Stress: No Stress Concern Present (10/26/2023)    [employer] - Occupational Stress Questionnaire     Feeling of Stress  : Not at all   Recent Concern: Stress - Stress Concern Present (8/19/2023)    [employer] - Occupational Stress Questionnaire     Feeling of Stress : To some extent   Social Connections: Moderately Integrated (10/26/2023)    Social Connection and Isolation Panel [NHANES]     Frequency of Communication with Friends and Family: More than three times a week     Frequency of Social Gatherings with Friends and Family: Once a week     Attends Synagogue Services: More than 4 times per year     Active Member of Clubs or Organizations: No     Attends Club or Organization Meetings: Never     Marital Status:    Recent Concern: Social Connections - Moderately Isolated (9/11/2023)    Social Connection and Isolation Panel [NHANES]     Frequency of Communication with Friends and Family: Three times a week     Frequency of Social Gatherings with Friends and Family: Three times a week     Attends Synagogue Services: Never     Active Member of Clubs or Organizations: No     Attends Club or Organization Meetings: Never     Marital Status:    Housing Stability: Low Risk  (10/26/2023)    Housing Stability Vital Sign     Unable to Pay for Housing in the Last Year: No     Number of Places Lived in the Last Year: 1     Unstable Housing in the Last Year: No       Family History   Problem Relation Age of Onset    Heart disease Sister     Heart disease Maternal Grandmother     Colon cancer Father     Esophageal cancer Father     Cancer Father         Lung-smoker    Cancer Mother         Cervical    Cervical cancer Mother     Breast cancer Maternal Aunt     Suicide Daughter         jumped from parking structure    Drug abuse Daughter     Drug abuse Daughter     Rectal cancer Neg Hx     Stomach cancer Neg Hx     Crohn's disease Neg Hx     Ulcerative colitis Neg Hx     Diabetes Neg Hx     Hypertension Neg Hx        Review of patient's allergies indicates:   Allergen Reactions    Bee sting [allergen  ext-venom-honey bee]      Rash      Grass pollen-bermuda, standard      rash       No current facility-administered medications on file prior to encounter.     Current Outpatient Medications on File Prior to Encounter   Medication Sig Dispense Refill    amoxicillin (AMOXIL) 875 MG tablet Take 1 tablet (875 mg total) by mouth every 12 (twelve) hours. for 10 days 20 tablet 0    gabapentin (NEURONTIN) 100 MG capsule Take 2 capsules (200 mg total) by mouth every evening. 90 capsule 3    loratadine (CLARITIN) 10 mg tablet Take 10 mg by mouth once daily.      mirtazapine (REMERON) 30 MG tablet Take 1 tablet (30 mg total) by mouth nightly. 30 tablet 11    oxyCODONE (ROXICODONE) 5 MG immediate release tablet Take 1 tablet (5 mg total) by mouth every 12 (twelve) hours as needed for Pain. 20 tablet 0    tolterodine (DETROL LA) 2 MG Cp24 Take 2 mg by mouth once daily.         Physical Exam    Reviewed nursing notes.  Vitals:    11/03/23 2003 11/03/23 2004 11/03/23 2100 11/03/23 2109   BP:  130/78 134/60    Pulse:  79 75    Resp: 16  17 16   Temp:       TempSrc:       SpO2:  100% 100%    Weight:         General:  Alert, no acute distress.    Skin:  Warm, dry, intact.  No rash.  Head:  Normocephalic, atraumatic.    Neck:  Supple.   Eye:  Normal conjunctiva.   Ears, nose, mouth and throat:  Normal phonation.  Cardiovascular:  Regular rate and rhythm, Normal peripheral perfusion, No edema.    Respiratory: respirations are non-labored.  Gastrointestinal:   Multiple surgical scars present.  Tender diffusely.  ileostomy has green output.  Urostomy has clear yellow urine.  Back:  Nontender.    Neurological:  Alert and oriented to person, place, time, and situation.    Psychiatric:  Cooperative, appropriate mood & affect.       Initial MDM and Data Review    58 y.o. female presenting for evaluation of Acute abdominal pain and vomiting since 6:00 p.m..  Vitals are within normal limits for age in appearance.  Afebrile.    Differential  includes:  Small bowel obstruction, gastroenteritis, issues with ostomy, urinary tract infection, less likely atypical ACS    Work-up includes:  CT abdomen pelvis, CBC, CMP, Istat, lipase, lactic acid, EKG    Interventions include:  Analgesia, antiemetics    The patient has significant medical comorbidities that influence decision making for this acute process, such as: cervical cancer with prior radiation/chemo, multiple prior surgeries    I decided to obtain the patient's medical records and review relevant documentation from hospital records and clinic records.  Pertinent information is noted.  She had a CT scan October 21st for abdominal discomfort that showed no significant changes over the course of the last few months, but no evidence of high-grade small bowel obstruction.    Medications   iohexoL (OMNIPAQUE 350) injection 100 mL (has no administration in time range)   morphine injection 6 mg (6 mg Intravenous Given 11/3/23 2109)   ondansetron injection 4 mg (4 mg Intravenous Given 11/3/23 2111)   lactated ringers bolus 1,000 mL (1,000 mLs Intravenous New Bag 11/3/23 2059)       Results and ED Course    Labs Reviewed   CBC W/ AUTO DIFFERENTIAL - Abnormal; Notable for the following components:       Result Value    MCHC 29.1 (*)     RDW 15.3 (*)     Gran # (ANC) 9.2 (*)     Gran % 81.4 (*)     Lymph % 10.6 (*)     All other components within normal limits   COMPREHENSIVE METABOLIC PANEL - Abnormal; Notable for the following components:    CO2 17 (*)     Glucose 112 (*)     BUN 22 (*)     Total Protein 8.7 (*)     eGFR 47.1 (*)     All other components within normal limits   URINALYSIS, REFLEX TO URINE CULTURE - Abnormal; Notable for the following components:    Appearance, UA Cloudy (*)     Protein, UA 2+ (*)     Nitrite, UA Positive (*)     Leukocytes, UA 3+ (*)     All other components within normal limits    Narrative:     Specimen Source->Urine   URINALYSIS MICROSCOPIC - Abnormal; Notable for the following  components:    RBC, UA 19 (*)     WBC, UA >100 (*)     WBC Clumps, UA Moderate (*)     Bacteria Many (*)     All other components within normal limits    Narrative:     Specimen Source->Urine   ISTAT PROCEDURE - Abnormal; Notable for the following components:    POC Glucose 119 (*)     POC Chloride 111 (*)     POC TCO2 (MEASURED) 22 (*)     All other components within normal limits   CULTURE, URINE   LIPASE   LACTIC ACID, PLASMA   ISTAT CHEM8       Imaging Results    None         ED Course as of 11/03/23 2214 Fri Nov 03, 2023 2121 POC Glucose(!): 119 [AC]   2121 WBC: 11.28 [AC]   2129 Sodium: 138 [AC]   2129 Potassium: 4.2 [AC]   2140 Creatinine: 1.3 [AC]   2153 Lipase: 29 [AC]   2153 Lactate, Nicko: 1.8 [AC]   2156 Calcium: 10.4 [AP]      ED Course User Index  [AC] Philip Calderon,   [AP] Gissel Mason MD           EKG interpreted by myself    EKG  Performed: 2057  Rate/Rhythm/Axis: 74 bpm, sinus rhythm, nml axis  QRS 78 ms  Qtc 430 ms  Impression:  Normal Sinus Rhythm.  EKG without evidence of Na channel blockade, K channel blockade, there is no prolonged QTc or digoxin effect.  There is no STEMI or signs of ischemia.    Impression and Plan    58 y.o. female with findings of abdominal pain with vomiting concerning for possible bowel obstruction based on the work up in the emergency department as above.    Important lab/imaging findings include: as above    All tests, treatment options and disposition options were discussed with the patient.  The decision was made to observe the patient in the ED while awaiting further work up.    The patient was signed out to oncoming attending in stable condition and all further questions/concerns by patient and/or family were addressed.           Final diagnoses:  [R11.10] Vomiting                 Philip Calderon DO  11/03/23 2214

## 2023-11-04 NOTE — ASSESSMENT & PLAN NOTE
58 y.o. 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 Abdominal exam is benign and labs reassuring, CT with some dilated small bowel but no patient has also had her normal amount of ileostomy output    - No acute surgical intervention recommend  - Recommend NGT placement  - Anticipate likely gastrograffin challenge after period of decompression  - NPO  - mIVF  - Please call with questions

## 2023-11-04 NOTE — NURSING
Pt arrived to room via stretcher with personal belongings and in no immediate distress. Pt AAO x 4, VS stable, NG tube intact and connected to int. suction. Pt has no concerns at this time. Pt oriented to room and call light use. Side rails up x 2, nonskid socks provided, bed locked and in lowest position, call light within reach.

## 2023-11-04 NOTE — SUBJECTIVE & OBJECTIVE
No current facility-administered medications on file prior to encounter.     Current Outpatient Medications on File Prior to Encounter   Medication Sig    amoxicillin (AMOXIL) 875 MG tablet Take 1 tablet (875 mg total) by mouth every 12 (twelve) hours. for 10 days    gabapentin (NEURONTIN) 100 MG capsule Take 2 capsules (200 mg total) by mouth every evening.    loratadine (CLARITIN) 10 mg tablet Take 10 mg by mouth once daily.    mirtazapine (REMERON) 30 MG tablet Take 1 tablet (30 mg total) by mouth nightly.    oxyCODONE (ROXICODONE) 5 MG immediate release tablet Take 1 tablet (5 mg total) by mouth every 12 (twelve) hours as needed for Pain.    tolterodine (DETROL LA) 2 MG Cp24 Take 2 mg by mouth once daily.       Review of patient's allergies indicates:   Allergen Reactions    Bee sting [allergen ext-venom-honey bee]      Rash      Grass pollen-bermuda, standard      rash       Past Medical History:   Diagnosis Date    Abnormal mammogram 08/25/2020    Acute blood loss anemia     Acute deep vein thrombosis (DVT) of lower extremity 12/09/2020    Advance care planning 04/30/2021    Anemia due to chronic blood loss     Anemia due to chronic kidney disease     Anxiety     Bilateral ureteral obstruction 9/11/2020    Cardiovascular event risk -- low 09/14/2015    ASCVD 10-year risk 1.9% (with optimal risk factors 1.3%) as of 9/14/2015     Cervical cancer 2014    Chronic back pain     Colostomy care     Deep vein thrombosis     Depression     Diarrhea due to malabsorption 11/14/2018    Difficult intubation     Disorder of kidney and ureter     DVT of lower extremity, bilateral 11/04/2020    Emphysema of lung 4/10/2023    Fibromyalgia     Fungemia 09/27/2020    Generalized abdominal pain 08/25/2020    GERD (gastroesophageal reflux disease)     Hemifacial spasm 09/16/2015    Hiatal hernia 2014    History of cervical cancer 10/11/2018    Hx of psychiatric care     Cymbalta, trazodone    Hypertension     Hypomagnesemia  11/21/2018    Lactose intolerance     Metastatic squamous cell carcinoma to lymph node 10/11/2018    Neuropathy due to chemotherapeutic drug 11/14/2018    Osteoarthritis of back     Peritonitis 09/22/2020    Pseudomonas urinary tract infection 04/21/2021    Psychiatric problem     Refusal of blood transfusions as patient is Yarsanism     Schatzki's ring 09/14/2015    Seen on outside EGD 05/2014, underwent esophageal dilatation. Bx were negative.     Seizures     Sleep stage dysfunction     Noted on PSG 06/2017; negative for obstructive sleep apnea     Stroke     Urinary tract infection associated with nephrostomy catheter 06/11/2020    Wound infection after surgery 09/24/2020     Past Surgical History:   Procedure Laterality Date    ANTEGRADE NEPHROSTOGRAPHY Bilateral 9/28/2020    Procedure: Nephrostogram - antegrade;  Surgeon: Leslie Balbuena MD;  Location: [facility] OR 1ST FLR;  Service: Urology;  Laterality: Bilateral;    ANTEGRADE NEPHROSTOGRAPHY Left 4/20/2021    Procedure: NEPHROSTOGRAM, ANTEGRADE;  Surgeon: Leslie Balbuena MD;  Location: [facility] OR 1ST FLR;  Service: Urology;  Laterality: Left;    ANTEGRADE NEPHROSTOGRAPHY Right 10/27/2022    Procedure: NEPHROSTOGRAM, ANTEGRADE;  Surgeon: Chirag Russ MD;  Location: [facility] OR 1ST FLR;  Service: Urology;  Laterality: Right;    ANTEGRADE NEPHROSTOGRAPHY Right 4/21/2023    Procedure: NEPHROSTOGRAM, ANTEGRADE;  Surgeon: Chirag Russ MD;  Location: [facility] OR 2ND FLR;  Service: Urology;  Laterality: Right;    ANTEGRADE NEPHROSTOGRAPHY Left 6/6/2023    Procedure: Nephrostogram - antegrade;  Surgeon: Leslie Balbuena MD;  Location: [facility] OR 1ST FLR;  Service: Urology;  Laterality: Left;    BILATERAL OOPHORECTOMY  2015    CHOLECYSTECTOMY  11/09/2016    Done at [facility], path showed chronic cholecystitis and gallstones    cold knife conization  2014    COLECTOMY, RIGHT  9/17/2020    Procedure: COLECTOMY, RIGHT Extended;  Surgeon: Hammad Reynolds MD;   Location: [facility] OR 2ND FLR;  Service: Colon and Rectal;;    COLONOSCOPY  2014    COLONOSCOPY N/A 10/24/2016    at [facility], Dr Thomas    COLONOSCOPY N/A 5/18/2018    Procedure: COLONOSCOPY;  Surgeon: Arden Gutiérrez MD;  Location: [facility] (4TH FLR);  Service: Endoscopy;  Laterality: N/A;    COLONOSCOPY N/A 7/28/2020    Procedure: COLONOSCOPY;  Surgeon: Hammad Reynolds MD;  Location: [facility] (4TH FLR);  Service: Colon and Rectal;  Laterality: N/A;  covid test [place] 7/25    CYSTOSCOPY WITH URETEROSCOPY, RETROGRADE PYELOGRAPHY, AND INSERTION OF STENT Bilateral 3/21/2020    Procedure: CYSTOSCOPY, WITH RETROGRADE PYELOGRAM,;  Surgeon: Leslie Balbuena MD;  [facility];  Service: Urology;  Laterality: Bilateral;    DILATION OF NEPHROSTOMY TRACT Right 10/27/2022    Procedure: DILATION, NEPHROSTOMY TRACT;  Surgeon: Chirag Russ MD;  Location: [facility] OR 1ST FLR;  Service: Urology;  Laterality: Right;    ESOPHAGOGASTRODUODENOSCOPY  2014    ESOPHAGOGASTRODUODENOSCOPY  11/18/2020    ESOPHAGOGASTRODUODENOSCOPY N/A 11/18/2020    Procedure: ESOPHAGOGASTRODUODENOSCOPY (EGD);  Surgeon: Zenon Spencer MD;  Location: [facility];  Service: Endoscopy;  Laterality: N/A;    ESOPHAGOGASTRODUODENOSCOPY N/A 12/11/2020    Procedure: EGD (ESOPHAGOGASTRODUODENOSCOPY);  Surgeon: Juancho Muse MD;  Location: [facility] (2ND FLR);  Service: Endoscopy;  Laterality: N/A;    HYSTERECTOMY  2014    [facility] for cervical cancer    ILEOSTOMY  9/17/2020    Procedure: CREATION, ILEOSTOMY;  Surgeon: Hammad Reynolds MD;  Location: [facility] OR 2ND FLR;  Service: Colon and Rectal;;    LOOPOGRAM N/A 4/20/2021    Procedure: LOOPOGRAM;  Surgeon: Leslie Balbuena MD;  [facility];  Service: Urology;  Laterality: N/A;    LOOPOGRAM N/A 6/6/2023    Procedure: LOOPOGRAM;  Surgeon: Leslie Balbuena MD;  Location: [address] FLR;  Service: Urology;  Laterality: N/A;    MOBILIZATION OF SPLENIC FLEXURE N/A 9/11/2020    Procedure:  MOBILIZATION, COLONIC;  Surgeon: Hammad Reynolds MD;  Location: [facility] OR 2ND FLR;  Service: Colon and Rectal;  Laterality: N/A;    NEPHROSTOGRAPHY Bilateral 4/17/2021    Procedure: Nephrostogram;  Surgeon: Celeste Surgeon;  Location: [facility] CELESTE;  Service: Anesthesiology;  Laterality: Bilateral;    OOPHORECTOMY      PERCUTANEOUS NEPHROLITHOTOMY Right 4/21/2023    Procedure: NEPHROLITHOTOMY, PERCUTANEOUS;  Surgeon: Chirag Russ MD;  Location: [facility] OR 2ND FLR;  Service: Urology;  Laterality: Right;  2.5 hrs    PERCUTANEOUS NEPHROSTOMY  4/21/2023    Procedure: CREATION, NEPHROSTOMY, PERCUTANEOUS with removal of existing nephrostomy tube;  Surgeon: Chirag Russ MD;  Location: [facility] OR 2ND FLR;  Service: Urology;;    PYELOSCOPY Right 10/27/2022    Procedure: PYELOSCOPY;  Surgeon: Chirag Russ MD;  [facility];  Service: Urology;  Laterality: Right;    REPLACEMENT OF NEPHROSTOMY TUBE Right 10/27/2022    Procedure: REPLACEMENT, NEPHROSTOMY TUBE;  Surgeon: Chirag Russ MD;  [facility];  Service: Urology;  Laterality: Right;    RETROPERITONEAL LYMPHADENECTOMY Right 9/17/2018    Procedure: LYMPHADENECTOMY, RETROPERITONEUM;  Surgeon: Sebas Reed MD;  Location: [facility] OR 2ND FLR;  Service: General;  Laterality: Right;  ILIAC    URETEROSCOPIC REMOVAL OF URETERIC CALCULUS Right 10/27/2022    Procedure: REMOVAL, CALCULUS, URETER, URETEROSCOPIC;  Surgeon: Chirag Russ MD;  [facility];  Service: Urology;  Laterality: Right;    URETEROSCOPY Right 10/27/2022    Procedure: URETEROSCOPY-ANTEGRADE;  Surgeon: Chirag Russ MD;  Location: [facility] OR 1ST FLR;  Service: Urology;  Laterality: Right;     Family History       Problem Relation (Age of Onset)    Breast cancer Maternal Aunt    Cancer Father, Mother    Cervical cancer Mother    Colon cancer Father    Drug abuse Daughter, Daughter    Esophageal cancer Father    Heart disease Sister, Maternal Grandmother     Suicide Daughter          Tobacco Use    Smoking status: Never    Smokeless tobacco: Never   Substance and Sexual Activity    Alcohol use: No     Alcohol/week: 0.0 standard drinks of alcohol    Drug use: No    Sexual activity: Yes     Partners: Male     Birth control/protection: None     Comment:  17 years since 1999     Review of Systems   Constitutional:  Negative for fever.   Gastrointestinal:  Positive for abdominal pain, nausea and vomiting. Negative for blood in stool, constipation and diarrhea.     Objective:     Vital Signs (Most Recent):  Temp: 98.1 °F (36.7 °C) (11/03/23 1915)  Pulse: 71 (11/04/23 0005)  Resp: 13 (11/04/23 0005)  BP: 123/71 (11/04/23 0005)  SpO2: 100 % (11/04/23 0005) Vital Signs (24h Range):  Temp:  [98.1 °F (36.7 °C)] 98.1 °F (36.7 °C)  Pulse:  [71-95] 71  Resp:  [13-18] 13  SpO2:  [99 %-100 %] 100 %  BP: (114-141)/(60-78) 123/71     Weight: 83.9 kg (185 lb)  Body mass index is 27.32 kg/m².     Physical Exam  Vitals reviewed.   Constitutional:       General: She is not in acute distress.     Appearance: She is well-developed.   HENT:      Head: Normocephalic and atraumatic.   Eyes:      General:         Right eye: No discharge.         Left eye: No discharge.      Conjunctiva/sclera: Conjunctivae normal.   Cardiovascular:      Rate and Rhythm: Normal rate and regular rhythm.   Pulmonary:      Effort: Pulmonary effort is normal. No respiratory distress.   Abdominal:      General: There is no distension.      Palpations: Abdomen is soft.      Tenderness: There is no abdominal tenderness. There is no guarding or rebound.      Comments: Abdomen is soft and non-tender, urinary conduit in LLQ with light yellow urine present, ileostomy in R mid abdomen with both formed and liquid stool   Musculoskeletal:         General: No deformity. Normal range of motion.      Cervical back: Normal range of motion.   Skin:     General: Skin is warm and dry.   Neurological:      Mental Status: She is  alert and oriented to person, place, and time.   Psychiatric:         Behavior: Behavior normal.            I have reviewed all pertinent lab results within the past 24 hours.  CBC:   Recent Labs   Lab 11/03/23 2058   WBC 11.28   RBC 4.69   HGB 12.9   HCT 44.4      MCV 95   MCH 27.5   MCHC 29.1*     CMP:   Recent Labs   Lab 11/03/23 2058   *   CALCIUM 10.4   ALBUMIN 3.5   PROT 8.7*      K 4.2   CO2 17*      BUN 22*   CREATININE 1.3   ALKPHOS 133   ALT 16   AST 20   BILITOT 0.2       Significant Diagnostics:  I have reviewed all pertinent imaging results/findings within the past 24 hours.    Imaging Results               CT Abdomen Pelvis With IV Contrast (Final result)  Result time 11/03/23 23:49:53      Final result by Adilson Gerard MD (11/03/23 23:49:53)                   Impression:      1. Few mildly dilated loops of small bowel anteriorly in the mid abdomen with a few minimal air-fluid levels also.  Angulation of adjacent bowel with small bowel wall thickening and nondistention could be associated with inflammatory or infectious process and possible partial obstruction.  See above comments.  Follow-up recommended.  2. Hepatomegaly.  3. Status post cholecystectomy with mild dilation of the biliary ducts.  See above comments.  4. Postoperative changes of the urinary system with moderate hydronephrosis on the right in minimal hydronephrosis on the left.  Percutaneous nephrostomy tube on the left with ureteral diversion procedure and left anterior pelvis urostomy.  This is similar to the prior study.  5. Prior partial colectomy and right lower quadrant ileostomy.  6. This report was flagged in Epic as abnormal.      Electronically signed by: Adilson Gerard  Date:    11/03/2023  Time:    23:49               Narrative:    EXAMINATION:  CT ABDOMEN PELVIS WITH IV CONTRAST    CLINICAL HISTORY:  Abdominal pain, acute, nonlocalized;    TECHNIQUE:  Low dose axial images, sagittal and coronal  reformations were obtained from the lung bases to the pubic symphysis following the IV administration of 100 mL of Omnipaque 350 .  Oral contrast was not administered.    COMPARISON:  10/21/2023    FINDINGS:  Abdomen:    - Lower thorax:    - Lung bases: No infiltrates and no nodules.    - Liver: Liver is mildly enlarged.  No focal abnormality.    - Gallbladder: Status post cholecystectomy.    - Bile Ducts: Mild dilation the common duct 11 mm.  Minimal dilation of the left intrahepatic biliary ducts.    - Spleen: Negative.    - Kidneys: Percutaneous nephrostomy tube on the left is unchanged.  Probable minimal hydronephrosis on the left.    Stable moderate hydronephrosis on the right.  Bilateral ureteral diversion procedure with urostomy extending to the left anterior pelvis.    Small cyst at the lower pole the left kidney.    - Adrenals: Unremarkable.    - Pancreas: No mass or peripancreatic fat stranding.    - Retroperitoneum:  No significant adenopathy.    - Vascular: No abdominal aortic aneurysm.    - Abdominal wall:  Ileostomy in the right lower quadrant.    Pelvis:    Urinary bladder is nondistended with limited characterization.    Bowel/Mesentery:    The postop changes of prior partial colectomy on the right.    Few dilated loops of small bowel anteriorly in the mid abdomen measuring approximately 3.1 cm on axial 70 in 3.4 cm on coronal 100.    The small bowel reverses course with 180 degree bend near axial 74-80 with mild small bowel wall thickening and nondistention distally could represent inflammatory or infectious process with possible partial obstruction.  Recommend follow-up    Bones:  No acute osseous abnormality and no suspicious lytic or blastic lesion.

## 2023-11-04 NOTE — ED TRIAGE NOTES
Pt presents to ED via personal transport. Pt endorses abdominal pain, nausea, vomiting, and weakness. Pt states onset was 1800 today.

## 2023-11-04 NOTE — ASSESSMENT & PLAN NOTE
Patient reports hx of HTN. In the ED, pressures are wnl. She reports she is not on any anti-HTN medication at home      PLAN:   - Continue to monitor patient BP, consider starting prn anti-hypertensive if BP elevated

## 2023-11-04 NOTE — SUBJECTIVE & OBJECTIVE
Past Medical History:   Diagnosis Date    Abnormal mammogram 08/25/2020    Acute blood loss anemia     Acute deep vein thrombosis (DVT) of lower extremity 12/09/2020    Advance care planning 04/30/2021    Anemia due to chronic blood loss     Anemia due to chronic kidney disease     Anxiety     Bilateral ureteral obstruction 9/11/2020    Cardiovascular event risk -- low 09/14/2015    ASCVD 10-year risk 1.9% (with optimal risk factors 1.3%) as of 9/14/2015     Cervical cancer 2014    Chronic back pain     Colostomy care     Deep vein thrombosis     Depression     Diarrhea due to malabsorption 11/14/2018    Difficult intubation     Disorder of kidney and ureter     DVT of lower extremity, bilateral 11/04/2020    Emphysema of lung 4/10/2023    Fibromyalgia     Fungemia 09/27/2020    Generalized abdominal pain 08/25/2020    GERD (gastroesophageal reflux disease)     Hemifacial spasm 09/16/2015    Hiatal hernia 2014    History of cervical cancer 10/11/2018    Hx of psychiatric care     Cymbalta, trazodone    Hypertension     Hypomagnesemia 11/21/2018    Lactose intolerance     Metastatic squamous cell carcinoma to lymph node 10/11/2018    Neuropathy due to chemotherapeutic drug 11/14/2018    Osteoarthritis of back     Peritonitis 09/22/2020    Pseudomonas urinary tract infection 04/21/2021    Psychiatric problem     Refusal of blood transfusions as patient is [employer]     Schatzki's ring 09/14/2015    Seen on outside EGD 05/2014, underwent esophageal dilatation. Bx were negative.     Seizures     Sleep stage dysfunction     Noted on PSG 06/2017; negative for obstructive sleep apnea     Stroke     Urinary tract infection associated with nephrostomy catheter 06/11/2020    Wound infection after surgery 09/24/2020       Past Surgical History:   Procedure Laterality Date    ANTEGRADE NEPHROSTOGRAPHY Bilateral 9/28/2020    Procedure: Nephrostogram - antegrade;  Surgeon: Leslie Balbuena MD;  Location: [facility] OR [address] [place] [address];   Service: Urology;  Laterality: Bilateral;    ANTEGRADE NEPHROSTOGRAPHY Left 4/20/2021    Procedure: NEPHROSTOGRAM, ANTEGRADE;  Surgeon: Leslie Balbuena MD;  Location: [facility] OR 1ST FLR;  Service: Urology;  Laterality: Left;    ANTEGRADE NEPHROSTOGRAPHY Right 10/27/2022    Procedure: NEPHROSTOGRAM, ANTEGRADE;  Surgeon: Chirag Russ MD;  Location: [facility] OR 1ST FLR;  Service: Urology;  Laterality: Right;    ANTEGRADE NEPHROSTOGRAPHY Right 4/21/2023    Procedure: NEPHROSTOGRAM, ANTEGRADE;  Surgeon: Chirag Russ MD;  Location: [facility] OR 2ND FLR;  Service: Urology;  Laterality: Right;    ANTEGRADE NEPHROSTOGRAPHY Left 6/6/2023    Procedure: Nephrostogram - antegrade;  Surgeon: Leslie Balbuena MD;  Location: [facility] OR 1ST FLR;  Service: Urology;  Laterality: Left;    BILATERAL OOPHORECTOMY  2015    CHOLECYSTECTOMY  11/09/2016    Done at [facility], path showed chronic cholecystitis and gallstones    cold knife conization  2014    COLECTOMY, RIGHT  9/17/2020    Procedure: COLECTOMY, RIGHT Extended;  Surgeon: Hammad Reynolds MD;  Location: [facility] OR 2ND FLR;  Service: Colon and Rectal;;    COLONOSCOPY  2014    [facility] N[facility]24[facility]    [facility]    COLONOSCOPY N/A 5/18/2018    Procedure: COLONOSCOPY;  Surgeon: Arden Gutiérrez MD;  Location: [facility] (4TH FLR);  Service: Endoscopy;  Laterality: N/A;    COLONOSCOPY N/A 7/28/2020    Procedure: COLONOSCOPY;  Surgeon: Hammad Reynolds MD;  Location: [facility] ENDO (4TH FLR);  Service: Colon and Rectal;  Laterality: N/A;  covid test [place] 7/25    CYSTOSCOPY WITH URETEROSCOPY, RETROGRADE PYELOGRAPHY, AND INSERTION OF STENT Bilateral 3/21/2020    Procedure: CYSTOSCOPY, WITH RETROGRADE PYELOGRAM,;  Surgeon: Leslie Balbuena MD;  Location: [facility] OR 1ST FLR;  Service: Urology;  Laterality: Bilateral;    DILATION OF NEPHROSTOMY TRACT Right 10/27/2022    Procedure: DILATION, NEPHROSTOMY TRACT;  Surgeon: Chirag Russ MD;  Location: [facility] OR 1ST FLR;  Service:  Urology;  Laterality: Right;    ESOPHAGOGASTRODUODENOSCOPY  2014    ESOPHAGOGASTRODUODENOSCOPY  11/18/2020    ESOPHAGOGASTRODUODENOSCOPY N/A 11/18/2020    Procedure: ESOPHAGOGASTRODUODENOSCOPY (EGD);  Surgeon: Zenon Spencer MD;  Location: [facility] ENDO;  Service: Endoscopy;  Laterality: N/A;    ESOPHAGOGASTRODUODENOSCOPY N/A 12/11/2020    Procedure: EGD (ESOPHAGOGASTRODUODENOSCOPY);  Surgeon: Juancho Muse MD;  Location: [facility] (2ND FLR);  Service: Endoscopy;  Laterality: N/A;    HYSTERECTOMY  2014    [facility] for cervical cancer    ILEOSTOMY  9/17/2020    Procedure: CREATION, ILEOSTOMY;  Surgeon: Hammad Reynolds MD;  Location: [facility] OR 2ND FLR;  Service: Colon and Rectal;;    LOOPOGRAM N/A 4/20/2021    Procedure: LOOPOGRAM;  Surgeon: Leslie Balbuena MD;  Location: [facility] OR 1ST FLR;  Service: Urology;  Laterality: N/A;    LOOPOGRAM N/A 6/6/2023    Procedure: LOOPOGRAM;  Surgeon: Leslie Balbuena MD;  Location: [facility] OR 1ST FLR;  Service: Urology;  Laterality: N/A;    MOBILIZATION OF SPLENIC FLEXURE N/A 9/11/2020    Procedure: MOBILIZATION, COLONIC;  Surgeon: Hammad Reynolds MD;  Location: [facility] OR 2ND FLR;  Service: Colon and Rectal;  Laterality: N/A;    NEPHROSTOGRAPHY Bilateral 4/17/2021    Procedure: Nephrostogram;  Surgeon: Celeste Surgeon;  Location: [facility];  Service: Anesthesiology;  Laterality: Bilateral;    OOPHORECTOMY      PERCUTANEOUS NEPHROLITHOTOMY Right 4/21/2023    Procedure: NEPHROLITHOTOMY, PERCUTANEOUS;  Surgeon: Chirag Russ MD;  Location: [facility] OR 2ND FLR;  Service: Urology;  Laterality: Right;  2.5 hrs    PERCUTANEOUS NEPHROSTOMY  4/21/2023    Procedure: CREATION, NEPHROSTOMY, PERCUTANEOUS with removal of existing nephrostomy tube;  Surgeon: Chirag Russ MD;  Location: [facility] OR 2ND FLR;  Service: Urology;;    PYELOSCOPY Right 10/27/2022    Procedure: PYELOSCOPY;  Surgeon: Chirag Russ MD;  Location: [facility] OR [address] [place] [address];  Service: Urology;  Laterality: Right;    REPLACEMENT OF  NEPHROSTOMY TUBE Right 10/27/2022    Procedure: REPLACEMENT, NEPHROSTOMY TUBE;  Surgeon: Chirag Russ MD;  Location: [facility] OR 1ST FLR;  Service: Urology;  Laterality: Right;    RETROPERITONEAL LYMPHADENECTOMY Right 9/17/2018    Procedure: LYMPHADENECTOMY, RETROPERITONEUM;  Surgeon: Sebas Reed MD;  Location: [facility] OR 2ND FLR;  Service: General;  Laterality: Right;  ILIAC    URETEROSCOPIC REMOVAL OF URETERIC CALCULUS Right 10/27/2022    Procedure: REMOVAL, CALCULUS, URETER, URETEROSCOPIC;  Surgeon: Chirag Russ MD;  Location: [facility] OR 1ST FLR;  Service: Urology;  Laterality: Right;    URETEROSCOPY Right 10/27/2022    Procedure: URETEROSCOPY-ANTEGRADE;  Surgeon: Chirag Russ MD;  Location: [facility] OR 1ST FLR;  Service: Urology;  Laterality: Right;       Review of patient's allergies indicates:   Allergen Reactions    Bee sting [allergen ext-venom-honey bee]      Rash      Grass pollen-bermuda, standard      rash       No current facility-administered medications on file prior to encounter.     Current Outpatient Medications on File Prior to Encounter   Medication Sig    amoxicillin (AMOXIL) 875 MG tablet Take 1 tablet (875 mg total) by mouth every 12 (twelve) hours. for 10 days    gabapentin (NEURONTIN) 100 MG capsule Take 2 capsules (200 mg total) by mouth every evening.    loratadine (CLARITIN) 10 mg tablet Take 10 mg by mouth once daily.    mirtazapine (REMERON) 30 MG tablet Take 1 tablet (30 mg total) by mouth nightly.    oxyCODONE (ROXICODONE) 5 MG immediate release tablet Take 1 tablet (5 mg total) by mouth every 12 (twelve) hours as needed for Pain.    tolterodine (DETROL LA) 2 MG Cp24 Take 2 mg by mouth once daily.     Family History       Problem Relation (Age of Onset)    Breast cancer Maternal Aunt    Cancer Father, Mother    Cervical cancer Mother    Colon cancer Father    Drug abuse Daughter, Daughter    Esophageal cancer Father    Heart disease Sister, Maternal Grandmother    Suicide  Daughter          Tobacco Use    Smoking status: Never    Smokeless tobacco: Never   Substance and Sexual Activity    Alcohol use: No     Alcohol/week: 0.0 standard drinks of alcohol    Drug use: No    Sexual activity: Yes     Partners: Male     Birth control/protection: None     Comment:  19 years since 1999     Review of Systems   Constitutional:  Positive for chills. Negative for activity change, diaphoresis, fatigue, fever and unexpected weight change.   HENT:  Negative for ear pain, sinus pressure, sinus pain and sore throat.    Eyes:  Negative for pain, redness and visual disturbance.   Respiratory:  Negative for cough, choking, chest tightness, shortness of breath, wheezing and stridor.    Cardiovascular:  Positive for chest pain. Negative for palpitations and leg swelling.   Gastrointestinal:  Positive for abdominal pain, nausea and vomiting. Negative for blood in stool, constipation and diarrhea.        Patient has Ostomy bags with normal output. No reported blood or change in consistency    Genitourinary:         Patient has nephrostomy bag    Musculoskeletal:  Negative for back pain and neck stiffness.   Neurological:  Negative for dizziness, syncope, light-headedness and headaches.   Psychiatric/Behavioral:  Negative for agitation, behavioral problems, confusion and hallucinations.      Objective:     Vital Signs (Most Recent):  Temp: 98.1 °F (36.7 °C) (11/03/23 1915)  Pulse: 71 (11/04/23 0005)  Resp: 13 (11/04/23 0005)  BP: 123/71 (11/04/23 0005)  SpO2: 100 % (11/04/23 0005) Vital Signs (24h Range):  Temp:  [98.1 °F (36.7 °C)] 98.1 °F (36.7 °C)  Pulse:  [71-95] 71  Resp:  [13-18] 13  SpO2:  [99 %-100 %] 100 %  BP: (114-141)/(60-78) 123/71     Weight: 83.9 kg (185 lb)  Body mass index is 27.32 kg/m².     Physical Exam  Constitutional:       Appearance: She is obese. She is ill-appearing.   HENT:      Head: Normocephalic and atraumatic.      Nose: Nose normal. No congestion.      Mouth/Throat:       Mouth: Mucous membranes are moist.      Pharynx: Oropharynx is clear.   Eyes:      Extraocular Movements: Extraocular movements intact.   Cardiovascular:      Rate and Rhythm: Normal rate and regular rhythm.      Pulses: Normal pulses.      Heart sounds: Normal heart sounds. No murmur heard.     No friction rub.   Pulmonary:      Effort: Pulmonary effort is normal. No respiratory distress.      Breath sounds: Normal breath sounds. No wheezing.   Chest:      Chest wall: No tenderness.   Abdominal:      General: There is distension (Mild distension).      Palpations: Abdomen is soft.      Tenderness: There is abdominal tenderness. There is no guarding or rebound.      Comments: Patient has colostomy bag with normal output and ileostomy bag with normal output. She also has a mid abdominal scar that seems to be well healed.   Genitourinary:     Comments: Left nephrostomy bag with normal urine output no discoloration. Tube seems to be in place with no signs of leakage or erythema   Musculoskeletal:         General: Normal range of motion.      Cervical back: No rigidity.   Skin:     General: Skin is warm.   Neurological:      General: No focal deficit present.      Mental Status: She is alert and oriented to person, place, and time.   Psychiatric:         Mood and Affect: Mood normal.         Behavior: Behavior normal.                Significant Labs: All pertinent labs within the past 24 hours have been reviewed.    Significant Imaging: I have reviewed all pertinent imaging results/findings within the past 24 hours.

## 2023-11-04 NOTE — PLAN OF CARE
[facility] ([place] [address])  Initial Discharge Assessment       Primary Care Provider: Trina Huggins MD    Admission Diagnosis: Vomiting [R11.10]  Small bowel obstruction [K56.609]  Encounter for imaging study to confirm nasogastric (NG) tube placement [Z01.89]  Chest pain [R07.9]    Admission Date: 11/3/2023  Expected Discharge Date: 11/7/2023    Transition of Care Barriers: None    Payor: MEDICAID / Plan: [employer] [facility]) / Product Type: Managed Medicaid /     Extended Emergency Contact Information  Primary Emergency Contact: Hammad Riley  Address: [address] [place] [address] [facility]  Home Phone: [phone]  Work Phone: [phone]  Mobile Phone: [phone]  Relation: Spouse  Secondary Emergency Contact: Federico Riley  Mobile Phone: [phone]  Relation: Daughter    Discharge Plan A: Home with family  Discharge Plan B: Home Health      [id number] - [place] - [address]  [address]  [place] [address]  Phone: [phone] Fax: [phone]    [facility]  [address]  [facility] [address]  Phone: [phone] Fax: [phone]      Initial Assessment (most recent)       Adult Discharge Assessment - 11/04/23 1234          Discharge Assessment    Assessment Type Discharge Planning Assessment     Confirmed/corrected address, phone number and insurance Yes     Confirmed Demographics Correct on Facesheet     Source of Information patient     When was your last doctors appointment? 10/04/23   Trina huggins [phone]    Communicated OK with patient/caregiver Yes     Facility Arrived From: Gener[facility]ized abdominal pain     Do you expect to return to your current living situation? Yes     Do you have help at home or someone to help you manage your care at home? Yes     Who are your caregiver(s) and their phone number(s)? Hammad Riley (Spouse)   [phone]  (Mobile     Prior to hospitilization cognitive status: Alert/Oriented     Current cognitive status: Alert/Oriented     Home Accessibility stairs to enter home     Number of Stairs, Main Entrance four     Equipment Currently Used at Home walker, rolling;wheelchair     Readmission within 30 days? Yes     Do you currently have service(s) that help you manage your care at home? No     Do you take prescription medications? Yes     Do you have prescription coverage? Yes     Coverage MEDICAID - [facility] ([facility])     Do you have any problems affording any of your prescribed medications? No     Is the patient taking medications as prescribed? yes     Who is going to help you get home at discharge? Hammad Riley (Spouse)   [phone] (Mobile)     How do you get to doctors appointments? family or friend will provide     Are you on dialysis? No     Do you take coumadin? No     DME Needed Upon Discharge  other (see comments)   TBD    Discharge Plan discussed with: Patient     Transition of Care Barriers None     Discharge Plan A Home with family     Discharge Plan B Home Health        Physical Activity    On average, how many days per week do you engage in moderate to strenuous exercise (like a brisk walk)? 4 days     On average, how many minutes do you engage in exercise at this level? 20 min        Financial Resource Strain    How hard is it for you to pay for the very basics like food, housing, medical care, and heating? Not hard at all        Housing Stability    In the last 12 months, was there a time when you were not able to pay the mortgage or rent on time? No     In the last 12 months, was there a time when you did not have a steady place to sleep or slept in a shelter (including now)? No        Transportation Needs    In the past 12 months, has lack of transportation kept you from medical appointments or from getting medications? No     In the past 12 months, has lack of transportation kept  you from meetings, work, or from getting things needed for daily living? No        Food Insecurity    Within the past 12 months, you worried that your food would run out before you got the money to buy more. Never true     Within the past 12 months, the food you bought just didn't last and you didn't have money to get more. Never true        Stress    Do you feel stress - tense, restless, nervous, or anxious, or unable to sleep at night because your mind is troubled all the time - these days? To some extent        Social Connections    In a typical week, how many times do you talk on the phone with family, friends, or neighbors? Three times a week     How often do you get together with friends or relatives? Once a week     How often do you attend Advent or Jewish services? 1 to 4 times per year     Do you belong to any clubs or organizations such as Advent groups, unions, fraternal or athletic groups, or school groups? No     How often do you attend meetings of the clubs or organizations you belong to? 1 to 4 times per year     Are you , , , , never , or living with a partner?    Hammad Riley (Spouse)   [phone] (Mobile)       Alcohol Use    Q1: How often do you have a drink containing alcohol? Never     Q2: How many drinks containing alcohol do you have on a typical day when you are drinking? Patient does not drink     Q3: How often do you have six or more drinks on one occasion? Never                     Readmission Assessment (most recent)       Readmission Assessment - 11/04/23 1239          Readmission    Why were you hospitalized in the last 30 days? yes 10/26-10/29/23 a week ago     Why were you readmitted? Related to previous admission     When you left the hospital how did you feel? I was a little naseated     When you left the hospital where did you go? Home with Family     Did patient/caregiver refused recommended DC plan? No     Tell me about what  happened between when you left the hospital and the day you returned. My stomach started hurting and I could not relieve my pain     When did you start not feeling well? It started on Thursday     Did you try to manage your symptoms your self? Yes     What did you do? I took pain medication     Did you call anyone? Yes     Who did you call? PCP     Did you try to see or did see a doctor or nurse before you came? Yes     Were you seen? No     Why? --   I decided to come to the ER    Did you have  a follow-up appointment on discharge? Yes     Did you go? Yes     Was this a planned readmission? No                      CM met with patient at bedside to complete discharge planning assessment.  Patient alert and oriented xs 4.  Patient verified all demographic information on facesheet is correct.  Patient verified PCP is Dr Trina Vu.  Patient verified primary health insurance is  [place] Medicaid: MEDICAID - [facility]. Patient  is agreeable with bedside medication delivery.   Patient  is not active with  home health and has listed DME.  Patient with NO POA or Living Will.  Patient not on dialysis or medication coumadin.  Patient with no 30 day admission.  Patient with no financial issues at this time.  Patient family [spouse]  will provide transportation upon discharge from facility.  Patient will have assistance from her   upon discharge Patient independent with ADLs. Patient lives  with her spouse. All questions answered regarding Case Management Discharge Planning, patient verbalized understanding.  Discharge booklet with CM's contact information given to patient.    Kiki Peterson RN  Case Management  [facility]  [phone]

## 2023-11-04 NOTE — CARE UPDATE
General Surgery    Patient seen on morning rounds.   Having ostomy output. No abdominal pain  Minimal Ng tube output  Will plan for GGC in the pm   General surgery will continue to follow.

## 2023-11-04 NOTE — PLAN OF CARE
Problem: Adult Inpatient Plan of Care  Goal: Plan of Care Review  Outcome: Ongoing, Progressing  Goal: Patient-Specific Goal (Individualized)  Outcome: Ongoing, Progressing  Goal: Absence of Hospital-Acquired Illness or Injury  Outcome: Ongoing, Progressing  Goal: Optimal Comfort and Wellbeing  Outcome: Ongoing, Progressing  Goal: Readiness for Transition of Care  Outcome: Ongoing, Progressing     Problem: Fluid and Electrolyte Imbalance (Acute Kidney Injury/Impairment)  Goal: Fluid and Electrolyte Balance  Outcome: Ongoing, Progressing     Problem: Oral Intake Inadequate (Acute Kidney Injury/Impairment)  Goal: Optimal Nutrition Intake  Outcome: Ongoing, Progressing     Problem: Renal Function Impairment (Acute Kidney Injury/Impairment)  Goal: Effective Renal Function  Outcome: Ongoing, Progressing     Problem: Skin Injury Risk Increased  Goal: Skin Health and Integrity  Outcome: Ongoing, Progressing     Problem: Impaired Wound Healing  Goal: Optimal Wound Healing  Outcome: Ongoing, Progressing     No acute changes to report overnight. Pt AAO x 4, VS stable. Medications given as ordered. NG tube intact.

## 2023-11-05 LAB
ALBUMIN SERPL BCP-MCNC: 3.1 G/DL (ref 3.5–5.2)
ALP SERPL-CCNC: 124 U/L (ref 55–135)
ALT SERPL W/O P-5'-P-CCNC: 19 U/L (ref 10–44)
ANION GAP SERPL CALC-SCNC: 12 MMOL/L (ref 8–16)
AST SERPL-CCNC: 21 U/L (ref 10–40)
BASOPHILS # BLD AUTO: 0.02 K/UL (ref 0–0.2)
BASOPHILS NFR BLD: 0.3 % (ref 0–1.9)
BILIRUB SERPL-MCNC: 0.3 MG/DL (ref 0.1–1)
BUN SERPL-MCNC: 20 MG/DL (ref 6–20)
CALCIUM SERPL-MCNC: 10 MG/DL (ref 8.7–10.5)
CHLORIDE SERPL-SCNC: 113 MMOL/L (ref 95–110)
CO2 SERPL-SCNC: 18 MMOL/L (ref 23–29)
CREAT SERPL-MCNC: 1.4 MG/DL (ref 0.5–1.4)
DIFFERENTIAL METHOD: ABNORMAL
EOSINOPHIL # BLD AUTO: 0.2 K/UL (ref 0–0.5)
EOSINOPHIL NFR BLD: 3.5 % (ref 0–8)
ERYTHROCYTE [DISTWIDTH] IN BLOOD BY AUTOMATED COUNT: 15.6 % (ref 11.5–14.5)
EST. GFR  (NO RACE VARIABLE): 43.1 ML/MIN/1.73 M^2
GLUCOSE SERPL-MCNC: 90 MG/DL (ref 70–110)
HCT VFR BLD AUTO: 40.7 % (ref 37–48.5)
HGB BLD-MCNC: 12.3 G/DL (ref 12–16)
IMM GRANULOCYTES # BLD AUTO: 0.01 K/UL (ref 0–0.04)
IMM GRANULOCYTES NFR BLD AUTO: 0.2 % (ref 0–0.5)
LYMPHOCYTES # BLD AUTO: 1.1 K/UL (ref 1–4.8)
LYMPHOCYTES NFR BLD: 16.8 % (ref 18–48)
MAGNESIUM SERPL-MCNC: 2 MG/DL (ref 1.6–2.6)
MCH RBC QN AUTO: 27.8 PG (ref 27–31)
MCHC RBC AUTO-ENTMCNC: 30.2 G/DL (ref 32–36)
MCV RBC AUTO: 92 FL (ref 82–98)
MONOCYTES # BLD AUTO: 0.8 K/UL (ref 0.3–1)
MONOCYTES NFR BLD: 12.5 % (ref 4–15)
NEUTROPHILS # BLD AUTO: 4.2 K/UL (ref 1.8–7.7)
NEUTROPHILS NFR BLD: 66.7 % (ref 38–73)
NRBC BLD-RTO: 0 /100 WBC
PHOSPHATE SERPL-MCNC: 3.4 MG/DL (ref 2.7–4.5)
PLATELET # BLD AUTO: 349 K/UL (ref 150–450)
PMV BLD AUTO: 9.2 FL (ref 9.2–12.9)
POTASSIUM SERPL-SCNC: 3.7 MMOL/L (ref 3.5–5.1)
PROT SERPL-MCNC: 7.7 G/DL (ref 6–8.4)
RBC # BLD AUTO: 4.42 M/UL (ref 4–5.4)
SODIUM SERPL-SCNC: 143 MMOL/L (ref 136–145)
WBC # BLD AUTO: 6.3 K/UL (ref 3.9–12.7)

## 2023-11-05 PROCEDURE — 36415 COLL VENOUS BLD VENIPUNCTURE: CPT

## 2023-11-05 PROCEDURE — 12000002 HC ACUTE/MED SURGE SEMI-PRIVATE ROOM

## 2023-11-05 PROCEDURE — 80053 COMPREHEN METABOLIC PANEL: CPT

## 2023-11-05 PROCEDURE — 25000003 PHARM REV CODE 250

## 2023-11-05 PROCEDURE — 63600175 PHARM REV CODE 636 W HCPCS

## 2023-11-05 PROCEDURE — 83735 ASSAY OF MAGNESIUM: CPT

## 2023-11-05 PROCEDURE — 85025 COMPLETE CBC W/AUTO DIFF WBC: CPT

## 2023-11-05 PROCEDURE — 84100 ASSAY OF PHOSPHORUS: CPT

## 2023-11-05 RX ORDER — MIRTAZAPINE 30 MG/1
30 TABLET, FILM COATED ORAL NIGHTLY
Status: DISCONTINUED | OUTPATIENT
Start: 2023-11-05 | End: 2023-11-06 | Stop reason: HOSPADM

## 2023-11-05 RX ORDER — SODIUM CHLORIDE, SODIUM LACTATE, POTASSIUM CHLORIDE, CALCIUM CHLORIDE 600; 310; 30; 20 MG/100ML; MG/100ML; MG/100ML; MG/100ML
INJECTION, SOLUTION INTRAVENOUS CONTINUOUS
Status: ACTIVE | OUTPATIENT
Start: 2023-11-05 | End: 2023-11-05

## 2023-11-05 RX ADMIN — CIPROFLOXACIN 400 MG: 2 INJECTION, SOLUTION INTRAVENOUS at 05:11

## 2023-11-05 RX ADMIN — ENOXAPARIN SODIUM 40 MG: 40 INJECTION SUBCUTANEOUS at 05:11

## 2023-11-05 RX ADMIN — SODIUM CHLORIDE, POTASSIUM CHLORIDE, SODIUM LACTATE AND CALCIUM CHLORIDE: 600; 310; 30; 20 INJECTION, SOLUTION INTRAVENOUS at 08:11

## 2023-11-05 RX ADMIN — METRONIDAZOLE 500 MG: 500 INJECTION, SOLUTION INTRAVENOUS at 08:11

## 2023-11-05 RX ADMIN — CIPROFLOXACIN 400 MG: 2 INJECTION, SOLUTION INTRAVENOUS at 06:11

## 2023-11-05 RX ADMIN — METRONIDAZOLE 500 MG: 500 INJECTION, SOLUTION INTRAVENOUS at 03:11

## 2023-11-05 RX ADMIN — MIRTAZAPINE 30 MG: 30 TABLET, FILM COATED ORAL at 09:11

## 2023-11-05 RX ADMIN — SODIUM CHLORIDE, POTASSIUM CHLORIDE, SODIUM LACTATE AND CALCIUM CHLORIDE: 600; 310; 30; 20 INJECTION, SOLUTION INTRAVENOUS at 07:11

## 2023-11-05 NOTE — PLAN OF CARE
Problem: Adult Inpatient Plan of Care  Goal: Plan of Care Review  Outcome: Ongoing, Progressing  Goal: Patient-Specific Goal (Individualized)  Outcome: Ongoing, Progressing  Goal: Absence of Hospital-Acquired Illness or Injury  Outcome: Ongoing, Progressing  Goal: Optimal Comfort and Wellbeing  Outcome: Ongoing, Progressing  Goal: Readiness for Transition of Care  Outcome: Ongoing, Progressing     Problem: Fluid and Electrolyte Imbalance (Acute Kidney Injury/Impairment)  Goal: Fluid and Electrolyte Balance  Outcome: Ongoing, Progressing     Problem: Oral Intake Inadequate (Acute Kidney Injury/Impairment)  Goal: Optimal Nutrition Intake  Outcome: Ongoing, Progressing     Problem: Renal Function Impairment (Acute Kidney Injury/Impairment)  Goal: Effective Renal Function  Outcome: Ongoing, Progressing     Problem: Skin Injury Risk Increased  Goal: Skin Health and Integrity  Outcome: Ongoing, Progressing     Problem: Impaired Wound Healing  Goal: Optimal Wound Healing  Outcome: Ongoing, Progressing      Pt had an uneventful night. VSS. POC reviewed with pt. Pt verbalized understanding. NGT remains in place to rt. Nare  via low intermittent suction.draining dark green bile. Pt had  450 ml of dark bile in canister  Ileostomy remains in place via RLQ . Bag was changed once this shift. Stoma pink with no s/s of infection. Urostomy remains in place to LLQ draining clear urine . Nephrostomy tube remains in place to lt flank draining clear urine. Pt is free of falls and injuries. Bed in lowest position and call light within reach.  Safety maintained

## 2023-11-05 NOTE — SUBJECTIVE & OBJECTIVE
Interval History:           Past Medical History:   Diagnosis Date    Abnormal mammogram 08/25/2020    Acute blood loss anemia     Acute deep vein thrombosis (DVT) of lower extremity 12/09/2020    Advance care planning 04/30/2021    Anemia due to chronic blood loss     Anemia due to chronic kidney disease     Anxiety     Bilateral ureteral obstruction 9/11/2020    Cardiovascular event risk -- low 09/14/2015    ASCVD 10-year risk 1.9% (with optimal risk factors 1.3%) as of 9/14/2015     Cervical cancer 2014    Chronic back pain     Colostomy care     Deep vein thrombosis     Depression     Diarrhea due to malabsorption 11/14/2018    Difficult intubation     Disorder of kidney and ureter     DVT of lower extremity, bilateral 11/04/2020    Emphysema of lung 4/10/2023    Fibromyalgia     Fungemia 09/27/2020    Generalized abdominal pain 08/25/2020    GERD (gastroesophageal reflux disease)     Hemifacial spasm 09/16/2015    Hiatal hernia 2014    History of cervical cancer 10/11/2018    Hx of psychiatric care     Cymbalta, trazodone    Hypertension     Hypomagnesemia 11/21/2018    Lactose intolerance     Metastatic squamous cell carcinoma to lymph node 10/11/2018    Neuropathy due to chemotherapeutic drug 11/14/2018    Osteoarthritis of back     Peritonitis 09/22/2020    Pseudomonas urinary tract infection 04/21/2021    Psychiatric problem     Refusal of blood transfusions as patient is [employer]     Schatzki's ring 09/14/2015    Seen on outside EGD 05/2014, underwent esophageal dilatation. Bx were negative.     Seizures     Sleep stage dysfunction     Noted on PSG 06/2017; negative for obstructive sleep apnea     Stroke     Urinary tract infection associated with nephrostomy catheter 06/11/2020    Wound infection after surgery 09/24/2020       Past Surgical History:   Procedure Laterality Date    ANTEGRADE NEPHROSTOGRAPHY Bilateral 9/28/2020    Procedure: Nephrostogram - antegrade;  Surgeon: Leslie Balbuena MD;   Location: [facility] OR 1ST FLR;  Service: Urology;  Laterality: Bilateral;    ANTEGRADE NEPHROSTOGRAPHY Left 4/20/2021    Procedure: NEPHROSTOGRAM, ANTEGRADE;  Surgeon: Leslie Balbuena MD;  Location: [facility] OR 1ST FLR;  Service: Urology;  Laterality: Left;    ANTEGRADE NEPHROSTOGRAPHY Right 10/27/2022    Procedure: NEPHROSTOGRAM, ANTEGRADE;  Surgeon: Chirag Russ MD;  Location: [facility] OR 1ST FLR;  Service: Urology;  Laterality: Right;    ANTEGRADE NEPHROSTOGRAPHY Right 4/21/2023    Procedure: NEPHROSTOGRAM, ANTEGRADE;  Surgeon: Chirag Russ MD;  Location: [facility] OR 2ND FLR;  Service: Urology;  Laterality: Right;    ANTEGRADE NEPHROSTOGRAPHY Left 6/6/2023    Procedure: Nephrostogram - antegrade;  Surgeon: Leslie Balbuena MD;  Location: [facility] OR 1ST FLR;  Service: Urology;  Laterality: Left;    BILATERAL OOPHORECTOMY  2015    CHOLECYSTECTOMY  11/09/2016    Done at [facility], path showed chronic cholecystitis and gallstones    cold knife conization  2014    COLECTOMY, RIGHT  9/17/2020    Procedure: COLECTOMY, RIGHT Extended;  Surgeon: Hammad Reynolds MD;  Location: [facility] OR 2ND FLR;  Service: Colon and Rectal;;    COLONOSCOPY  2014    [facility]COPY N[facility]A [facility]24/2016    [facility]    COLONOSCOPY N/A 5/18/2018    Procedure: COLONOSCOPY;  Surgeon: Arden Gutiérrez MD;  Location: [facility] (4TH FLR);  Service: Endoscopy;  Laterality: N/A;    COLONOSCOPY N/A 7/28/2020    Procedure: COLONOSCOPY;  Surgeon: Hammad Reynolds MD;  Location: [facility] ENDO (4TH FLR);  Service: Colon and Rectal;  Laterality: N/A;  covid test [place] 7/25    CYSTOSCOPY WITH URETEROSCOPY, RETROGRADE PYELOGRAPHY, AND INSERTION OF STENT Bilateral 3/21/2020    Procedure: CYSTOSCOPY, WITH RETROGRADE PYELOGRAM,;  Surgeon: Leslie Balbuena MD;  Location: [facility] OR 1ST FLR;  Service: Urology;  Laterality: Bilateral;    DILATION OF NEPHROSTOMY TRACT Right 10/27/2022    Procedure: DILATION, NEPHROSTOMY TRACT;  Surgeon: Chirag Russ MD;  Location: [facility]  OR 1ST FLR;  Service: Urology;  Laterality: Right;    ESOPHAGOGASTRODUODENOSCOPY  2014    ESOPHAGOGASTRODUODENOSCOPY  11/18/2020    ESOPHAGOGASTRODUODENOSCOPY N/A 11/18/2020    Procedure: ESOPHAGOGASTRODUODENOSCOPY (EGD);  Surgeon: Zenon Spencer MD;  Location: [facility] ENDO;  Service: Endoscopy;  Laterality: N/A;    ESOPHAGOGASTRODUODENOSCOPY N/A 12/11/2020    Procedure: EGD (ESOPHAGOGASTRODUODENOSCOPY);  Surgeon: Juancho Muse MD;  Location: [facility] ENDO (2ND FLR);  Service: Endoscopy;  Laterality: N/A;    HYSTERECTOMY  2014    [facility] for cervical cancer    ILEOSTOMY  9/17/2020    Procedure: CREATION, ILEOSTOMY;  Surgeon: Hammad Reynolds MD;  Location: [facility] OR 2ND FLR;  Service: Colon and Rectal;;    LOOPOGRAM N/A 4/20/2021    Procedure: LOOPOGRAM;  Surgeon: Leslie Balbuena MD;  Location: [facility] OR 1ST FLR;  Service: Urology;  Laterality: N/A;    LOOPOGRAM N/A 6/6/2023    Procedure: LOOPOGRAM;  Surgeon: Leslie Balbuena MD;  Location: [facility] OR 1ST FLR;  Service: Urology;  Laterality: N/A;    MOBILIZATION OF SPLENIC FLEXURE N/A 9/11/2020    Procedure: MOBILIZATION, COLONIC;  Surgeon: Hammad Reynolds MD;  Location: [facility] OR 2ND FLR;  Service: Colon and Rectal;  Laterality: N/A;    NEPHROSTOGRAPHY Bilateral 4/17/2021    Procedure: Nephrostogram;  Surgeon: Celeste Surgeon;  Location: [facility];  Service: Anesthesiology;  Laterality: Bilateral;    OOPHORECTOMY      PERCUTANEOUS NEPHROLITHOTOMY Right 4/21/2023    Procedure: NEPHROLITHOTOMY, PERCUTANEOUS;  Surgeon: Chirag Russ MD;  Location: [facility] OR 2ND FLR;  Service: Urology;  Laterality: Right;  2.5 hrs    PERCUTANEOUS NEPHROSTOMY  4/21/2023    Procedure: CREATION, NEPHROSTOMY, PERCUTANEOUS with removal of existing nephrostomy tube;  Surgeon: Chirag Russ MD;  Location: [facility] OR 2ND FLR;  Service: Urology;;    PYELOSCOPY Right 10/27/2022    Procedure: PYELOSCOPY;  Surgeon: Chirag Russ MD;  Location: [facility] OR [address] [place] [address];  Service: Urology;  Laterality: Right;     REPLACEMENT OF NEPHROSTOMY TUBE Right 10/27/2022    Procedure: REPLACEMENT, NEPHROSTOMY TUBE;  Surgeon: Chirag Russ MD;  Location: [facility] OR 1ST FLR;  Service: Urology;  Laterality: Right;    RETROPERITONEAL LYMPHADENECTOMY Right 9/17/2018    Procedure: LYMPHADENECTOMY, RETROPERITONEUM;  Surgeon: Sebas Reed MD;  Location: [facility] OR 2ND FLR;  Service: General;  Laterality: Right;  ILIAC    URETEROSCOPIC REMOVAL OF URETERIC CALCULUS Right 10/27/2022    Procedure: REMOVAL, CALCULUS, URETER, URETEROSCOPIC;  Surgeon: Chirag Russ MD;  Location: [facility] OR 1ST FLR;  Service: Urology;  Laterality: Right;    URETEROSCOPY Right 10/27/2022    Procedure: URETEROSCOPY-ANTEGRADE;  Surgeon: Chirag Russ MD;  [facility];  Service: Urology;  Laterality: Right;       Review of patient's allergies indicates:   Allergen Reactions    Bee sting [allergen ext-venom-honey bee]      Rash      Grass pollen-bermuda, standard      rash       No current facility-administered medications on file prior to encounter.     Current Outpatient Medications on File Prior to Encounter   Medication Sig    amoxicillin (AMOXIL) 875 MG tablet Take 1 tablet (875 mg total) by mouth every 12 (twelve) hours. for 10 days    loratadine (CLARITIN) 10 mg tablet Take 10 mg by mouth daily as needed for Allergies.    [DISCONTINUED] gabapentin (NEURONTIN) 100 MG capsule Take 2 capsules (200 mg total) by mouth every evening.    [DISCONTINUED] mirtazapine (REMERON) 30 MG tablet Take 1 tablet (30 mg total) by mouth nightly.    [DISCONTINUED] oxyCODONE (ROXICODONE) 5 MG immediate release tablet Take 1 tablet (5 mg total) by mouth every 12 (twelve) hours as needed for Pain.    [DISCONTINUED] tolterodine (DETROL LA) 2 MG Cp24 Take 2 mg by mouth once daily.     Family History       Problem Relation (Age of Onset)    Breast cancer Maternal Aunt    Cancer Father, Mother    Cervical cancer Mother    Colon cancer Father    Drug abuse  Daughter, Daughter    Esophageal cancer Father    Heart disease Sister, Maternal Grandmother    Suicide Daughter          Tobacco Use    Smoking status: Never    Smokeless tobacco: Never   Substance and Sexual Activity    Alcohol use: No     Alcohol/week: 0.0 standard drinks of alcohol    Drug use: No    Sexual activity: Yes     Partners: Male     Birth control/protection: None     Comment:  19 years since 1999     Review of Systems   Constitutional:  Negative for activity change, chills, diaphoresis, fatigue, fever and unexpected weight change.   HENT:  Negative for ear pain, sinus pressure, sinus pain and sore throat.    Eyes:  Negative for pain, redness and visual disturbance.   Respiratory:  Negative for cough, choking, chest tightness, shortness of breath, wheezing and stridor.    Cardiovascular:  Negative for chest pain, palpitations and leg swelling.   Gastrointestinal:  Positive for abdominal pain. Negative for blood in stool, constipation, diarrhea, nausea and vomiting.        Patient has Ostomy bags with normal output. No reported blood or change in consistency    Genitourinary:         Patient has nephrostomy bag    Musculoskeletal:  Negative for back pain and neck stiffness.   Neurological:  Negative for dizziness, syncope, light-headedness and headaches.   Psychiatric/Behavioral:  Negative for agitation, behavioral problems, confusion and hallucinations.      Objective:     Vital Signs (Most Recent):  Temp: 98.7 °F (37.1 °C) (11/05/23 0754)  Pulse: 80 (11/05/23 0754)  Resp: 19 (11/05/23 0754)  BP: 126/74 (11/05/23 0754)  SpO2: (!) 94 % (11/05/23 0754) Vital Signs (24h Range):  Temp:  [97.6 °F (36.4 °C)-98.7 °F (37.1 °C)] 98.7 °F (37.1 °C)  Pulse:  [71-81] 80  Resp:  [18-19] 19  SpO2:  [94 %-98 %] 94 %  BP: (107-137)/(56-78) 126/74     Weight: 83.9 kg (185 lb)  Body mass index is 27.32 kg/m².     Physical Exam  Constitutional:       Appearance: She is obese. She is ill-appearing.   HENT:       Head: Normocephalic and atraumatic.      Nose: Nose normal. No congestion.      Mouth/Throat:      Mouth: Mucous membranes are moist.      Pharynx: Oropharynx is clear.   Eyes:      Extraocular Movements: Extraocular movements intact.   Cardiovascular:      Rate and Rhythm: Normal rate and regular rhythm.      Pulses: Normal pulses.      Heart sounds: Normal heart sounds. No murmur heard.     No friction rub.   Pulmonary:      Effort: Pulmonary effort is normal. No respiratory distress.      Breath sounds: Normal breath sounds. No wheezing.   Chest:      Chest wall: No tenderness.   Abdominal:      General: There is distension (Mild distension).      Palpations: Abdomen is soft.      Tenderness: There is abdominal tenderness. There is no guarding or rebound.      Comments: colostomy bag with normal output   ileostomy bag with normal output   Genitourinary:     Comments: Left nephrostomy bag with normal urine output no discoloration. Tube seems to be in place with no signs of leakage or erythema   Musculoskeletal:         General: Normal range of motion.      Cervical back: No rigidity.   Skin:     General: Skin is warm.   Neurological:      General: No focal deficit present.      Mental Status: She is alert and oriented to person, place, and time.   Psychiatric:         Mood and Affect: Mood normal.         Behavior: Behavior normal.                Significant Labs: All pertinent labs within the past 24 hours have been reviewed.    Significant Imaging: I have reviewed all pertinent imaging results/findings within the past 24 hours.

## 2023-11-05 NOTE — ASSESSMENT & PLAN NOTE
"Ms. Riley is a 57 y/o female with a PMH of b/t ureteral sticture secondary to radiation for cervical cancer, s/p open transverse colon conduit urinary diversion on 9/11/2020, MDD, b/t nephrostomy tubes ( right side tube has been removed, left was left in place), multiple episodes of pyelonephritis since August 2023 presenting to the ED due to abdominal pain, nausea, and vomiting that started today around 6 PM. Patient reports that she was going about her regular day and a sharp diffuse abdominal pain started around the afternoon. She reports that the pain became associated with nausea and severe vomiting (Yellow vomit).  She reports normal output from her ostomy bags and nephrostomy bags. She reports that no blood was apparent in the bags and nothing seems to have changes in regards to the output these past few days.  She denies any headaches, chest pain, SOB, hematemesis, flank pain, and blurry vision. Patient reports that she has not changed her diet recently. She also reports that she doesn't take any medication except for Amoxicillin which she was sent home with after recently being discharged from the hospital. She denies any sick contacts as well.     In the ED, patient is afebrile and HDS. WBC is 11.28. Hgb is 12.9. Lipase is wnl at 29. Lactic acid is wnl at 1.8. AST/A:T is wnl at 20/16. UA is positive for 3+ leukocytes,nitrites, many bacteria, and WBC >100. CT demonstrates mildly dilated loops from possible infectious/inflammatory source or pSBO. General surgery evaluated her in the ED and recommended no acute surgical intervention recommended NGT placement, NPO status, and mIVF. Abdominal exam is benign for acute abdomen. CT A/P reveals "Few mildly dilated loops of small bowel anteriorly in the mid abdomen with a few minimal air-fluid levels also.  Angulation of adjacent bowel with small bowel wall thickening and nondistention could be associated with inflammatory or infectious process and possible " partial obstruction. EKG in ED revealed QTC of 430.     Continue to monitor and assess need for infectious workup. Nausea/Vomiting/Abdominal pain likely 2/2 to suspected partial obstruction. Will start patient on Cipro and Flagyl in light of suspected infectious cause but more likely partial SBO.     PLAN:  - GG pass through, no contrast in colon or small bowel  - General surgery consulted,remove NG advancing diet  - Continuous IVF, LR @ 100 cc/hr   - CBC, CMP, Mg, Phos  - Anticipate likely gastrograffin challenge after period of decompression per gen surg recs  - prn zofran for nausea, monitor QTC with daily EKG's if needed

## 2023-11-05 NOTE — PROGRESS NOTES
[facility] - Intensive [facility] ([address] Medicine  Progress Note    Patient Name: Edita Riley  MRN: [id number]  Patient Class: IP- Inpatient   Admission Date: 11/3/2023  Length of Stay: 1 days  Attending Physician: Shelbie Duffy MD  Primary Care Provider: Trina Vu MD        Subjective:     Principal Problem:Generalized abdominal pain        HPI:  Ms. Riley is a 59 y/o female with a PMH of b/t ureteral sticture secondary to radiation for cervical cancer, s/p open transverse colon conduit urinary diversion on 9/11/2020, MDD, b/t nephrostomy tubes ( right side tube has been removed, left was left in place), multiple episodes of pyelonephritis since August 2023 presenting to the ED due to abdominal pain, nausea, and vomiting that started today around 6 PM. Patient reports that she was going about her regular day and a sharp diffuse abdominal pain started around the afternoon. She reports that the pain became associated with nausea and severe vomiting (Yellow vomit). Patient reports that before it started today, she had no symptoms the day prior. She reports normal output from her ostomy bags and nephrostomy bags. She reports that no blood was apparent in the bags and nothing seems to have changes in regards to the output these past few days.  She denies any headaches, chest pain, SOB, hematemesis, flank pain, and blurry vision. Patient reports that she has not changed her diet recently. She also reports that she doesn't take any medication except for Amoxicillin which she was sent home with after recently being discharged from the hospital. She denies any sick contacts as well.     Patient was recently admitted to the hospital on 10/26/23 due to further management of a displaced left nephrostomy tube. Urology evaluated the patient on admission and recommended IR consult for left nephrostomy tube placement. Interventional radiology placed a percutaneous nephrostomy tube on  10/26. She had a slight ODESSA which was treated with IV fluids and monitored. Her urinary tract infection ( E. Faecalis)  which was found on 10/21/23  was being treated while she was admitted on 10/26/23. She was transitioned from IV ampicillin to PO Amoxicillin regimen upon discharge with instructions to take medication for 10 more days to complete a total of 14 day course. She was nauseated while taking oral antibiotics which improved with oral antiemetics.     In the ED, patient is afebrile and HDS. WBC is 11.28. Hgb is 12.9. Lipase is wnl at 29. Lactic acid is wnl at 1.8. AST/ALT is wnl at 20/16. UA is positive for 3+ leukocytes,nitrites, many bacteria, and WBC >100. CT demonstrates mildly dilated loops from possible infectious/inflammatory source or pSBO. General surgery evaluated her in the ED and recommended No acute surgical intervention recommended NGT placement, NPO status, and mIVF.            Overview/Hospital Course:  In the ED, patient is afebrile and HDS. WBC is 11.28. Hgb is 12.9. Lipase is wnl at 29. Lactic acid is wnl at 1.8. AST/ALT is wnl at 20/16. UA is positive for 3+ leukocytes,nitrites, many bacteria, and WBC >100. CT demonstrates mildly dilated loops from possible infectious/inflammatory source or pSBO. General surgery evaluated her in the ED and recommended No acute surgical intervention recommended NGT placement, NPO status, and mIVF. Gastrografin ordered with xray at 4 and 8 hours post GG. Pass through not significant for SBO, advancing diet as tolerated    Interval History:           Past Medical History:   Diagnosis Date    Abnormal mammogram 08/25/2020    Acute blood loss anemia     Acute deep vein thrombosis (DVT) of lower extremity 12/09/2020    Advance care planning 04/30/2021    Anemia due to chronic blood loss     Anemia due to chronic kidney disease     Anxiety     Bilateral ureteral obstruction 9/11/2020    Cardiovascular event risk -- low 09/14/2015    ASCVD 10-year risk 1.9%  (with optimal risk factors 1.3%) as of 9/14/2015     Cervical cancer 2014    Chronic back pain     Colostomy care     Deep vein thrombosis     Depression     Diarrhea due to malabsorption 11/14/2018    Difficult intubation     Disorder of kidney and ureter     DVT of lower extremity, bilateral 11/04/2020    Emphysema of lung 4/10/2023    Fibromyalgia     Fungemia 09/27/2020    Generalized abdominal pain 08/25/2020    GERD (gastroesophageal reflux disease)     Hemifacial spasm 09/16/2015    Hiatal hernia 2014    History of cervical cancer 10/11/2018    Hx of psychiatric care     Cymbalta, trazodone    Hypertension     Hypomagnesemia 11/21/2018    Lactose intolerance     Metastatic squamous cell carcinoma to lymph node 10/11/2018    Neuropathy due to chemotherapeutic drug 11/14/2018    Osteoarthritis of back     Peritonitis 09/22/2020    Pseudomonas urinary tract infection 04/21/2021    Psychiatric problem     Refusal of blood transfusions as patient is Christianity     Schatzki's ring 09/14/2015    Seen on outside EGD 05/2014, underwent esophageal dilatation. Bx were negative.     Seizures     Sleep stage dysfunction     Noted on PSG 06/2017; negative for obstructive sleep apnea     Stroke     Urinary tract infection associated with nephrostomy catheter 06/11/2020    Wound infection after surgery 09/24/2020       Past Surgical History:   Procedure Laterality Date    ANTEGRADE NEPHROSTOGRAPHY Bilateral 9/28/2020    Procedure: Nephrostogram - antegrade;  Surgeon: Leslie Balbuena MD;  Location: [address];  Service: Urology;  Laterality: Bilateral;    ANTEGRADE NEPHROSTOGRAPHY Left 4/20/2021    Procedure: NEPHROSTOGRAM, ANTEGRADE;  Surgeon: Leslie Balbuena MD;  Location: [address];  Service: Urology;  Laterality: Left;    ANTEGRADE NEPHROSTOGRAPHY Right 10/27/2022    Procedure: NEPHROSTOGRAM, ANTEGRADE;  Surgeon: Chirag Russ MD;  Location: [address];  Service: Urology;  Laterality:  Right;    ANTEGRADE NEPHROSTOGRAPHY Right 4/21/2023    Procedure: NEPHROSTOGRAM, ANTEGRADE;  Surgeon: Chirag Russ MD;  Location: [facility] OR 2ND FLR;  Service: Urology;  Laterality: Right;    ANTEGRADE NEPHROSTOGRAPHY Left 6/6/2023    Procedure: Nephrostogram - antegrade;  Surgeon: Leslie Balbuena MD;  Location: [facility] OR 1ST FLR;  Service: Urology;  Laterality: Left;    BILATERAL OOPHORECTOMY  2015    CHOLECYSTECTOMY  11/09/2016    Done at [facility], path showed chronic cholecystitis and gallstones    cold knife conization  2014    COLECTOMY, RIGHT  9/17/2020    Procedure: COLECTOMY, RIGHT Extended;  Surgeon: Hammad Reynolds MD;  Location: [facility] OR 2ND FLR;  Service: Colon and Rectal;;    COLONOSCOPY  2014    COLONOSCOPY N/A 10/24/2016    at [facility], Dr Gutiérrez    COLONOSCOPY N/A 5/18/2018    Procedure: COLONOSCOPY;  Surgeon: Arden Gutiérrez MD;  Location: [facility] (4TH FLR);  Service: Endoscopy;  Laterality: N/A;    COLONOSCOPY N/A 7/28/2020    Procedure: COLONOSCOPY;  Surgeon: Hammad Reynolds MD;  Location: [facility] (4TH FLR);  Service: Colon and Rectal;  Laterality: N/A;  covid test [place] 7/25    CYSTOSCOPY WITH URETEROSCOPY, RETROGRADE PYELOGRAPHY, AND INSERTION OF STENT Bilateral 3/21/2020    Procedure: CYSTOSCOPY, WITH RETROGRADE PYELOGRAM,;  Surgeon: Leslie Balbuena MD;  [facility];  Service: Urology;  Laterality: Bilateral;    DILATION OF NEPHROSTOMY TRACT Right 10/27/2022    Procedure: DILATION, NEPHROSTOMY TRACT;  Surgeon: Chirag Russ MD;  Location: [facility] OR 1ST FLR;  Service: Urology;  Laterality: Right;    ESOPHAGOGASTRODUODENOSCOPY  2014    ESOPHAGOGASTRODUODENOSCOPY  11/18/2020    ESOPHAGOGASTRODUODENOSCOPY N/A 11/18/2020    Procedure: ESOPHAGOGASTRODUODENOSCOPY (EGD);  Surgeon: Zenon Spencer MD;  Location: [facility] ENDO;  Service: Endoscopy;  Laterality: N/A;    ESOPHAGOGASTRODUODENOSCOPY N/A 12/11/2020    Procedure: EGD (ESOPHAGOGASTRODUODENOSCOPY);  Surgeon: Juancho ROSARIO  MD Micha;  Location: [facility] ENDO (2ND FLR);  Service: Endoscopy;  Laterality: N/A;    HYSTERECTOMY  2014    [facility] for cervical cancer    ILEOSTOMY  9/17/2020    Procedure: CREATION, ILEOSTOMY;  Surgeon: Hammad Reynolds MD;  [facility] OR 2ND FLR;  Service: Colon and Rectal;;    LOOPOGRAM N/A 4/20/2021    Procedure: LOOPOGRAM;  Surgeon: Leslie Balbuena MD;  [facility]: [facility] OR 1ST FLR;  Service: Urology;  Laterality: N/A;    LOOPOGRAM N/A 6/6/2023    Procedure: LOOPOGRAM;  Surgeon: Leslie Balbuena MD;  Location: [facility] OR 1ST FLR;  Service: Urology;  Laterality: N/A;    MOBILIZATION OF SPLENIC FLEXURE N/A 9/11/2020    Procedure: MOBILIZATION, COLONIC;  Surgeon: Hammad Reynolds MD;  Location: [facility] OR 2ND FLR;  Service: Colon and Rectal;  Laterality: N/A;    NEPHROSTOGRAPHY Bilateral 4/17/2021    Procedure: Nephrostogram;  Surgeon: Celeste Surgeon;  Location: [facility];  Service: Anesthesiology;  Laterality: Bilateral;    OOPHORECTOMY      PERCUTANEOUS NEPHROLITHOTOMY Right 4/21/2023    Procedure: NEPHROLITHOTOMY, PERCUTANEOUS;  Surgeon: Chirag Russ MD;  [facility];  Service: Urology;  Laterality: Right;  2.5 hrs    PERCUTANEOUS NEPHROSTOMY  4/21/2023    Procedure: CREATION, NEPHROSTOMY, PERCUTANEOUS with removal of existing nephrostomy tube;  Surgeon: Chirag Russ MD;  [facility];  Service: Urology;;    PYELOSCOPY Right 10/27/2022    Procedure: PYELOSCOPY;  Surgeon: Chirag Russ MD;  [facility];  Service: Urology;  Laterality: Right;    REPLACEMENT OF NEPHROSTOMY TUBE Right 10/27/2022    Procedure: REPLACEMENT, NEPHROSTOMY TUBE;  Surgeon: Chirag Russ MD;  [facility];  Service: Urology;  Laterality: Right;    RETROPERITONEAL LYMPHADENECTOMY Right 9/17/2018    Procedure: LYMPHADENECTOMY, RETROPERITONEUM;  Surgeon: Sebas Reed MD;  [facility];  Service: General;  Laterality: Right;  ILIAC    URETEROSCOPIC REMOVAL OF  URETERIC CALCULUS Right 10/27/2022    Procedure: REMOVAL, CALCULUS, URETER, URETEROSCOPIC;  Surgeon: Chirag Russ MD;  Location: [facility] OR [address] [place] [address];  Service: Urology;  Laterality: Right;    URETEROSCOPY Right 10/27/2022    Procedure: URETEROSCOPY-ANTEGRADE;  Surgeon: Chirag Russ MD;  Location: [facility] OR [address] [place] [address];  Service: Urology;  Laterality: Right;       Review of patient's allergies indicates:   Allergen Reactions    Bee sting [allergen ext-venom-honey bee]      Rash      Grass pollen-bermuda, standard      rash       No current facility-administered medications on file prior to encounter.     Current Outpatient Medications on File Prior to Encounter   Medication Sig    amoxicillin (AMOXIL) 875 MG tablet Take 1 tablet (875 mg total) by mouth every 12 (twelve) hours. for 10 days    loratadine (CLARITIN) 10 mg tablet Take 10 mg by mouth daily as needed for Allergies.    [DISCONTINUED] gabapentin (NEURONTIN) 100 MG capsule Take 2 capsules (200 mg total) by mouth every evening.    [DISCONTINUED] mirtazapine (REMERON) 30 MG tablet Take 1 tablet (30 mg total) by mouth nightly.    [DISCONTINUED] oxyCODONE (ROXICODONE) 5 MG immediate release tablet Take 1 tablet (5 mg total) by mouth every 12 (twelve) hours as needed for Pain.    [DISCONTINUED] tolterodine (DETROL LA) 2 MG Cp24 Take 2 mg by mouth once daily.     Family History       Problem Relation (Age of Onset)    Breast cancer Maternal Aunt    Cancer Father, Mother    Cervical cancer Mother    Colon cancer Father    Drug abuse Daughter, Daughter    Esophageal cancer Father    Heart disease Sister, Maternal Grandmother    Suicide Daughter          Tobacco Use    Smoking status: Never    Smokeless tobacco: Never   Substance and Sexual Activity    Alcohol use: No     Alcohol/week: 0.0 standard drinks of alcohol    Drug use: No    Sexual activity: Yes     Partners: Male     Birth control/protection: None     Comment:  19 years since 1999     Review of  Systems   Constitutional:  Negative for activity change, chills, diaphoresis, fatigue, fever and unexpected weight change.   HENT:  Negative for ear pain, sinus pressure, sinus pain and sore throat.    Eyes:  Negative for pain, redness and visual disturbance.   Respiratory:  Negative for cough, choking, chest tightness, shortness of breath, wheezing and stridor.    Cardiovascular:  Negative for chest pain, palpitations and leg swelling.   Gastrointestinal:  Positive for abdominal pain. Negative for blood in stool, constipation, diarrhea, nausea and vomiting.        Patient has Ostomy bags with normal output. No reported blood or change in consistency    Genitourinary:         Patient has nephrostomy bag    Musculoskeletal:  Negative for back pain and neck stiffness.   Neurological:  Negative for dizziness, syncope, light-headedness and headaches.   Psychiatric/Behavioral:  Negative for agitation, behavioral problems, confusion and hallucinations.      Objective:     Vital Signs (Most Recent):  Temp: 98.7 °F (37.1 °C) (11/05/23 0754)  Pulse: 80 (11/05/23 0754)  Resp: 19 (11/05/23 0754)  BP: 126/74 (11/05/23 0754)  SpO2: (!) 94 % (11/05/23 0754) Vital Signs (24h Range):  Temp:  [97.6 °F (36.4 °C)-98.7 °F (37.1 °C)] 98.7 °F (37.1 °C)  Pulse:  [71-81] 80  Resp:  [18-19] 19  SpO2:  [94 %-98 %] 94 %  BP: (107-137)/(56-78) 126/74     Weight: 83.9 kg (185 lb)  Body mass index is 27.32 kg/m².     Physical Exam  Constitutional:       Appearance: She is obese. She is ill-appearing.   HENT:      Head: Normocephalic and atraumatic.      Nose: Nose normal. No congestion.      Mouth/Throat:      Mouth: Mucous membranes are moist.      Pharynx: Oropharynx is clear.   Eyes:      Extraocular Movements: Extraocular movements intact.   Cardiovascular:      Rate and Rhythm: Normal rate and regular rhythm.      Pulses: Normal pulses.      Heart sounds: Normal heart sounds. No murmur heard.     No friction rub.   Pulmonary:      Effort:  Pulmonary effort is normal. No respiratory distress.      Breath sounds: Normal breath sounds. No wheezing.   Chest:      Chest wall: No tenderness.   Abdominal:      General: There is distension (Mild distension).      Palpations: Abdomen is soft.      Tenderness: There is abdominal tenderness. There is no guarding or rebound.      Comments: colostomy bag with normal output   ileostomy bag with normal output   Genitourinary:     Comments: Left nephrostomy bag with normal urine output no discoloration. Tube seems to be in place with no signs of leakage or erythema   Musculoskeletal:         General: Normal range of motion.      Cervical back: No rigidity.   Skin:     General: Skin is warm.   Neurological:      General: No focal deficit present.      Mental Status: She is alert and oriented to person, place, and time.   Psychiatric:         Mood and Affect: Mood normal.         Behavior: Behavior normal.                Significant Labs: All pertinent labs within the past 24 hours have been reviewed.    Significant Imaging: I have reviewed all pertinent imaging results/findings within the past 24 hours.    Assessment/Plan:      * Generalized abdominal pain  Ms. Riley is a 59 y/o female with a PMH of b/t ureteral sticture secondary to radiation for cervical cancer, s/p open transverse colon conduit urinary diversion on 9/11/2020, MDD, b/t nephrostomy tubes ( right side tube has been removed, left was left in place), multiple episodes of pyelonephritis since August 2023 presenting to the ED due to abdominal pain, nausea, and vomiting that started today around 6 PM. Patient reports that she was going about her regular day and a sharp diffuse abdominal pain started around the afternoon. She reports that the pain became associated with nausea and severe vomiting (Yellow vomit).  She reports normal output from her ostomy bags and nephrostomy bags. She reports that no blood was apparent in the bags and nothing seems to  "have changes in regards to the output these past few days.  She denies any headaches, chest pain, SOB, hematemesis, flank pain, and blurry vision. Patient reports that she has not changed her diet recently. She also reports that she doesn't take any medication except for Amoxicillin which she was sent home with after recently being discharged from the hospital. She denies any sick contacts as well.     In the ED, patient is afebrile and HDS. WBC is 11.28. Hgb is 12.9. Lipase is wnl at 29. Lactic acid is wnl at 1.8. AST/A:T is wnl at 20/16. UA is positive for 3+ leukocytes,nitrites, many bacteria, and WBC >100. CT demonstrates mildly dilated loops from possible infectious/inflammatory source or pSBO. General surgery evaluated her in the ED and recommended no acute surgical intervention recommended NGT placement, NPO status, and mIVF. Abdominal exam is benign for acute abdomen. CT A/P reveals "Few mildly dilated loops of small bowel anteriorly in the mid abdomen with a few minimal air-fluid levels also.  Angulation of adjacent bowel with small bowel wall thickening and nondistention could be associated with inflammatory or infectious process and possible partial obstruction. EKG in ED revealed QTC of 430.     Continue to monitor and assess need for infectious workup. Nausea/Vomiting/Abdominal pain likely 2/2 to suspected partial obstruction. Will start patient on Cipro and Flagyl in light of suspected infectious cause but more likely partial SBO.     PLAN:  - GG pass through, no contrast in colon or small bowel  - General surgery consulted,remove NG advancing diet  - Continuous IVF, LR @ 100 cc/hr   - CBC, CMP, Mg, Phos  - Anticipate likely gastrograffin challenge after period of decompression per gen surg recs  - prn zofran for nausea, monitor QTC with daily EKG's if needed     UTI (urinary tract infection)  Patient admits to abdominal pain that had sudden onset today likely 2/2 to pSBO.  Patient recently went to the " ED for on 10/21/23. Patient reports that she was found to have a UTI, micro shows E. Faecalis. She was admitted to the hospital on 10/26/23 for left nephrostomy tube getting dislodged. She was treated with Ampicillin initially based on susceptibility results for E. Faecalis and transitioned to Amoxicillin to finish out a 14 day total course. Patient presents 11/4/23 with UA revealing UTI, she is currently on day 8 out of 14. In regards to her UTI, unclear source from where collection is occurring. Whether it is ileostomy conduit or Nephrostomy bag. Due to this reordered UA with instructions to collect from nephrostomy bag after being emptied. UA was nitrite +, and leukocyte +. Unclear if source of urine collection on 10/21/23 was from nephrostomy bag, might have been from ileostomy which would be in picture of E. Faecalis.      PLAN:   - 15 day course complete, discontinuing antibiotics     Major depressive disorder, recurrent episode, moderate  Patient has persistent depression which is severe and is currently controlled. Will hold anti-depressant medications (Mirtazapine) . We will not consult psychiatry at this time. Patient does not display psychosis at this time. Continue to monitor closely and adjust plan of care as needed. Patient reports not taking any medications recently except for her Amoxicillin which was prescribed for her UTI.    PLAN:  -Hold patient medications in setting of NPO status and unclear home regimen  -Continue patient meds and get clearer understanding of what she is taking once able to tolerate PO intake     History of essential hypertension  Patient reports hx of HTN. In the ED, pressures are wnl. She reports she is not on any anti-HTN medication at home      PLAN:   - Continue to monitor patient BP, consider starting prn anti-hypertensive if BP elevated        VTE Risk Mitigation (From admission, onward)           Ordered     enoxaparin injection 40 mg  Daily         11/04/23 0221     IP  VTE HIGH RISK PATIENT  Once         11/04/23 0221     Place sequential compression device  Until discontinued         11/04/23 0221                    Discharge Planning   OK: 11/7/2023     Code Status: Full Code   Is the patient medically ready for discharge?:     Reason for patient still in hospital (select all that apply): Treatment  Discharge Plan A: Home with family                  Alex Whiteside MD  Department of Hospital Medicine   [facility] - Intensive Care ([place]-[facility])

## 2023-11-05 NOTE — ASSESSMENT & PLAN NOTE
Patient admits to abdominal pain that had sudden onset today likely 2/2 to pSBO.  Patient recently went to the ED for on 10/21/23. Patient reports that she was found to have a UTI, micro shows E. Faecalis. She was admitted to the hospital on 10/26/23 for left nephrostomy tube getting dislodged. She was treated with Ampicillin initially based on susceptibility results for E. Faecalis and transitioned to Amoxicillin to finish out a 14 day total course. Patient presents 11/4/23 with UA revealing UTI, she is currently on day 8 out of 14. In regards to her UTI, unclear source from where collection is occurring. Whether it is ileostomy conduit or Nephrostomy bag. Due to this reordered UA with instructions to collect from nephrostomy bag after being emptied. UA was nitrite +, and leukocyte +. Unclear if source of urine collection on 10/21/23 was from nephrostomy bag, might have been from ileostomy which would be in picture of E. Faecalis.      PLAN:   - 15 day course complete, discontinuing antibiotics

## 2023-11-05 NOTE — SUBJECTIVE & OBJECTIVE
Interval History: No acute events overnight. HDS on RA. Denies nausea and vomiting. Ostomy with 2l out overnight. NG was placed back to suction several hours after contrast administration although patient said she was not nauseous at that time.     Medications:  Continuous Infusions:  Scheduled Meds:   ciprofloxacin  400 mg Intravenous Q12H    enoxparin  40 mg Subcutaneous Daily    metronidazole  500 mg Intravenous Q8H     PRN Meds:dextrose 10%, dextrose 10%, glucagon (human recombinant), glucose, glucose, naloxone, sodium chloride 0.9%     Review of patient's allergies indicates:   Allergen Reactions    Bee sting [allergen ext-venom-honey bee]      Rash      Grass pollen-bermuda, standard      rash     Objective:     Vital Signs (Most Recent):  Temp: 98.6 °F (37 °C) (11/05/23 0510)  Pulse: 74 (11/05/23 0510)  Resp: 18 (11/05/23 0510)  BP: 137/69 (11/05/23 0510)  SpO2: 96 % (11/05/23 0510) Vital Signs (24h Range):  Temp:  [97.6 °F (36.4 °C)-98.6 °F (37 °C)] 98.6 °F (37 °C)  Pulse:  [66-81] 74  Resp:  [17-18] 18  SpO2:  [95 %-99 %] 96 %  BP: (107-137)/(56-78) 137/69     Weight: 83.9 kg (185 lb)  Body mass index is 27.32 kg/m².    Intake/Output - Last 3 Shifts         11/03 0700 11/04 0659 11/04 0700 11/05 0659 11/05 0700 11/06 0659    P.O.  0     IV Piggyback 1000      Total Intake(mL/kg) 1000 (11.9) 0 (0)     Urine (mL/kg/hr)  810 (0.4)     Stool  2050     Total Output  2860     Net +1000 -2860                     Physical Exam  Vitals reviewed.   Constitutional:       General: She is not in acute distress.     Appearance: She is well-developed.   HENT:      Head: Normocephalic and atraumatic.   Eyes:      General:         Right eye: No discharge.         Left eye: No discharge.      Conjunctiva/sclera: Conjunctivae normal.   Cardiovascular:      Rate and Rhythm: Normal rate and regular rhythm.   Pulmonary:      Effort: Pulmonary effort is normal. No respiratory distress.   Abdominal:      General: There is no  distension.      Palpations: Abdomen is soft.      Tenderness: There is no abdominal tenderness. There is no guarding or rebound.      Comments: Abdomen is soft and non-tender, urinary conduit in LLQ with light yellow urine present, ileostomy in R mid abdomen with both formed and liquid stool   Musculoskeletal:         General: No deformity. Normal range of motion.      Cervical back: Normal range of motion.   Skin:     General: Skin is warm and dry.   Neurological:      Mental Status: She is alert and oriented to person, place, and time.   Psychiatric:         Behavior: Behavior normal.          Significant Labs:  I have reviewed all pertinent lab results within the past 24 hours.  CBC:   Recent Labs   Lab 11/05/23 0436   WBC 6.30   RBC 4.42   HGB 12.3   HCT 40.7      MCV 92   MCH 27.8   MCHC 30.2*     CMP:   Recent Labs   Lab 11/05/23 0436   GLU 90   CALCIUM 10.0   ALBUMIN 3.1*   PROT 7.7      K 3.7   CO2 18*   *   BUN 20   CREATININE 1.4   ALKPHOS 124   ALT 19   AST 21   BILITOT 0.3       Significant Diagnostics:  I have reviewed all pertinent imaging results/findings within the past 24 hours.

## 2023-11-05 NOTE — PROGRESS NOTES
"[facility] ([facility]-)  General Surgery  Progress Note    Subjective:     History of Present Illness:  Edita Riley is a 58 y.o. 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 This onset around 6 pm yesterday. It is located diffusely. She notes nausea with multiple episodes of emesis prior to arrival. She has had "normal" ileostomy output, not sure of volume. She has also had two recent admissions in Sep 2023 for abdominal pain and Oct 2023 for a dislodged neph tube requiring exchange.   She is afebrile and hemodynamically stable. Labs demonstrate WBC and lactate within normal limits, UA consistent with UTI, remainder of labs relatively unremarkable. CT demonstrates mildly dilated loops from possible infectious/inflammatory source or pSBO.       Post-Op Info:  * No surgery found *         Interval History: No acute events overnight. HDS on RA. Denies nausea and vomiting. Ostomy with 2l out overnight. NG was placed back to suction several hours after contrast administration although patient said she was not nauseous at that time.     Medications:  Continuous Infusions:  Scheduled Meds:   ciprofloxacin  400 mg Intravenous Q12H    enoxparin  40 mg Subcutaneous Daily    metronidazole  500 mg Intravenous Q8H     PRN Meds:dextrose 10%, dextrose 10%, glucagon (human recombinant), glucose, glucose, naloxone, sodium chloride 0.9%     Review of patient's allergies indicates:   Allergen Reactions    Bee sting [allergen ext-venom-honey bee]      Rash      Grass pollen-bermuda, standard      rash     Objective:     Vital Signs (Most Recent):  Temp: 98.6 " °F (37 °C) (11/05/23 0510)  Pulse: 74 (11/05/23 0510)  Resp: 18 (11/05/23 0510)  BP: 137/69 (11/05/23 0510)  SpO2: 96 % (11/05/23 0510) Vital Signs (24h Range):  Temp:  [97.6 °F (36.4 °C)-98.6 °F (37 °C)] 98.6 °F (37 °C)  Pulse:  [66-81] 74  Resp:  [17-18] 18  SpO2:  [95 %-99 %] 96 %  BP: (107-137)/(56-78) 137/69     Weight: 83.9 kg (185 lb)  Body mass index is 27.32 kg/m².    Intake/Output - Last 3 Shifts         11/03 0700 11/04 0659 11/04 0700 11/05 0659 11/05 0700 11/06 0659    P.O.  0     IV Piggyback 1000      Total Intake(mL/kg) 1000 (11.9) 0 (0)     Urine (mL/kg/hr)  810 (0.4)     Stool  2050     Total Output  2860     Net +1000 -2860                     Physical Exam  Vitals reviewed.   Constitutional:       General: She is not in acute distress.     Appearance: She is well-developed.   HENT:      Head: Normocephalic and atraumatic.   Eyes:      General:         Right eye: No discharge.         Left eye: No discharge.      Conjunctiva/sclera: Conjunctivae normal.   Cardiovascular:      Rate and Rhythm: Normal rate and regular rhythm.   Pulmonary:      Effort: Pulmonary effort is normal. No respiratory distress.   Abdominal:      General: There is no distension.      Palpations: Abdomen is soft.      Tenderness: There is no abdominal tenderness. There is no guarding or rebound.      Comments: Abdomen is soft and non-tender, urinary conduit in LLQ with light yellow urine present, ileostomy in R mid abdomen with both formed and liquid stool   Musculoskeletal:         General: No deformity. Normal range of motion.      Cervical back: Normal range of motion.   Skin:     General: Skin is warm and dry.   Neurological:      Mental Status: She is alert and oriented to person, place, and time.   Psychiatric:         Behavior: Behavior normal.          Significant Labs:  I have reviewed all pertinent lab results within the past 24 hours.  CBC:   Recent Labs   Lab 11/05/23  0436   WBC 6.30   RBC 4.42   HGB 12.3    HCT 40.7      MCV 92   MCH 27.8   MCHC 30.2*     CMP:   Recent Labs   Lab 11/05/23  0436   GLU 90   CALCIUM 10.0   ALBUMIN 3.1*   PROT 7.7      K 3.7   CO2 18*   *   BUN 20   CREATININE 1.4   ALKPHOS 124   ALT 19   AST 21   BILITOT 0.3       Significant Diagnostics:  I have reviewed all pertinent imaging results/findings within the past 24 hours.  Assessment/Plan:     * Generalized abdominal pain  58 y.o. 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 Abdominal exam is benign and labs reassuring, CT with some dilated small bowel but no patient has also had her normal amount of ileostomy output    - No acute surgical intervention recommend  - No contrast on follow KUB, however this could be that it completely traversed the bowel.   - Recommend removal of NG.  - Anticipate likely gastrograffin challenge after period of decompression  - Ok to advance to clear liquid diet  - mIVF  - Please call with questions        Andria Ulloa MD  General Surgery  [facility] ([place])

## 2023-11-05 NOTE — HOSPITAL COURSE
In the ED, patient is afebrile and HDS. WBC is 11.28. Hgb is 12.9. Lipase is wnl at 29. Lactic acid is wnl at 1.8. AST/ALT is wnl at 20/16. UA is positive for 3+ leukocytes,nitrites, many bacteria, and WBC >100. CT demonstrates mildly dilated loops from possible infectious/inflammatory source or pSBO. General surgery evaluated her in the ED and recommended No acute surgical intervention recommended NGT placement, NPO status, and mIVF. Gastrografin ordered with xray at 4 and 8 hours post GG. Pass through not significant for SBO, advancing diet as tolerated. On 11/6 the patient was tolerating a regular diet without nausea. She was discharged with plans for home health wound care. While admitted the patient was resumed on her mirtazapine due to depression. Return precautions given. Plan for PCP follow-up.

## 2023-11-05 NOTE — PHARMACY MED REC
"Admission Medication History     The home medication history was taken by [facility] Guevara.    You may go to "Admission" then "Reconcile Home Medications" tabs to review and/or act upon these items.     The home medication list has been updated by the Pharmacy department.   Please read ALL comments highlighted in yellow.   Please address this information as you see fit.    Feel free to contact us if you have any questions or require assistance.      The medications listed below were removed from the home medication list. Please reorder if appropriate:  Patient reports no longer taking the following medication(s):  GABAPENTIN 100 MG  MIRTAZAPINE 30 MG  OXYCODONE 5 MG IMMEDIATE RELEASE  TOLTERODINE 2 MG    Medications listed below were obtained from: Patient/family and Patient's pharmacy  PTA Medications   Medication Sig    amoxicillin (AMOXIL) 875 MG tablet Take 1 tablet (875 mg total) by mouth every 12 (twelve) hours. for 10 days    loratadine (CLARITIN) 10 mg tablet Take 10 mg by mouth daily as needed for Allergies.       Potential issues to be addressed PRIOR TO DISCHARGE  Patient requires education regarding drug therapies     Little Guevara  [id number]                  .          "

## 2023-11-05 NOTE — ASSESSMENT & PLAN NOTE
58 y.o. 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 Abdominal exam is benign and labs reassuring, CT with some dilated small bowel but no patient has also had her normal amount of ileostomy output    - No acute surgical intervention recommend  - No contrast on follow KUB, however this could be that it completely traversed the bowel.   - Recommend removal of NG.  - Anticipate likely gastrograffin challenge after period of decompression  - Ok to advance to clear liquid diet  - mIVF  - Please call with questions

## 2023-11-06 VITALS
HEART RATE: 71 BPM | RESPIRATION RATE: 18 BRPM | WEIGHT: 185 LBS | TEMPERATURE: 98 F | OXYGEN SATURATION: 99 % | DIASTOLIC BLOOD PRESSURE: 58 MMHG | SYSTOLIC BLOOD PRESSURE: 110 MMHG | BODY MASS INDEX: 27.32 KG/M2

## 2023-11-06 PROBLEM — N39.0 UTI (URINARY TRACT INFECTION): Status: RESOLVED | Noted: 2023-10-26 | Resolved: 2023-11-06

## 2023-11-06 PROBLEM — R10.84 GENERALIZED ABDOMINAL PAIN: Status: RESOLVED | Noted: 2023-11-04 | Resolved: 2023-11-06

## 2023-11-06 LAB
ALBUMIN SERPL BCP-MCNC: 2.9 G/DL (ref 3.5–5.2)
ALP SERPL-CCNC: 111 U/L (ref 55–135)
ALT SERPL W/O P-5'-P-CCNC: 17 U/L (ref 10–44)
ANION GAP SERPL CALC-SCNC: 9 MMOL/L (ref 8–16)
AST SERPL-CCNC: 17 U/L (ref 10–40)
BASOPHILS # BLD AUTO: 0.01 K/UL (ref 0–0.2)
BASOPHILS NFR BLD: 0.2 % (ref 0–1.9)
BILIRUB SERPL-MCNC: 0.3 MG/DL (ref 0.1–1)
BUN SERPL-MCNC: 14 MG/DL (ref 6–20)
CALCIUM SERPL-MCNC: 9.2 MG/DL (ref 8.7–10.5)
CHLORIDE SERPL-SCNC: 109 MMOL/L (ref 95–110)
CO2 SERPL-SCNC: 22 MMOL/L (ref 23–29)
CREAT SERPL-MCNC: 1.3 MG/DL (ref 0.5–1.4)
DIFFERENTIAL METHOD: ABNORMAL
EOSINOPHIL # BLD AUTO: 0.2 K/UL (ref 0–0.5)
EOSINOPHIL NFR BLD: 3.4 % (ref 0–8)
ERYTHROCYTE [DISTWIDTH] IN BLOOD BY AUTOMATED COUNT: 14.9 % (ref 11.5–14.5)
EST. GFR  (NO RACE VARIABLE): 47.1 ML/MIN/1.73 M^2
GLUCOSE SERPL-MCNC: 82 MG/DL (ref 70–110)
HCT VFR BLD AUTO: 36.9 % (ref 37–48.5)
HGB BLD-MCNC: 11 G/DL (ref 12–16)
IMM GRANULOCYTES # BLD AUTO: 0.01 K/UL (ref 0–0.04)
IMM GRANULOCYTES NFR BLD AUTO: 0.2 % (ref 0–0.5)
LYMPHOCYTES # BLD AUTO: 1.6 K/UL (ref 1–4.8)
LYMPHOCYTES NFR BLD: 24.1 % (ref 18–48)
MAGNESIUM SERPL-MCNC: 1.8 MG/DL (ref 1.6–2.6)
MCH RBC QN AUTO: 27.2 PG (ref 27–31)
MCHC RBC AUTO-ENTMCNC: 29.8 G/DL (ref 32–36)
MCV RBC AUTO: 91 FL (ref 82–98)
MONOCYTES # BLD AUTO: 0.8 K/UL (ref 0.3–1)
MONOCYTES NFR BLD: 12.8 % (ref 4–15)
NEUTROPHILS # BLD AUTO: 3.9 K/UL (ref 1.8–7.7)
NEUTROPHILS NFR BLD: 59.3 % (ref 38–73)
NRBC BLD-RTO: 0 /100 WBC
PHOSPHATE SERPL-MCNC: 3.1 MG/DL (ref 2.7–4.5)
PLATELET # BLD AUTO: 363 K/UL (ref 150–450)
PMV BLD AUTO: 9.6 FL (ref 9.2–12.9)
POTASSIUM SERPL-SCNC: 3.6 MMOL/L (ref 3.5–5.1)
PROT SERPL-MCNC: 7 G/DL (ref 6–8.4)
RBC # BLD AUTO: 4.04 M/UL (ref 4–5.4)
SODIUM SERPL-SCNC: 140 MMOL/L (ref 136–145)
WBC # BLD AUTO: 6.56 K/UL (ref 3.9–12.7)

## 2023-11-06 PROCEDURE — 84100 ASSAY OF PHOSPHORUS: CPT

## 2023-11-06 PROCEDURE — 83735 ASSAY OF MAGNESIUM: CPT

## 2023-11-06 PROCEDURE — 25000003 PHARM REV CODE 250

## 2023-11-06 PROCEDURE — 85025 COMPLETE CBC W/AUTO DIFF WBC: CPT

## 2023-11-06 PROCEDURE — 63600175 PHARM REV CODE 636 W HCPCS

## 2023-11-06 PROCEDURE — 80053 COMPREHEN METABOLIC PANEL: CPT

## 2023-11-06 PROCEDURE — 36415 COLL VENOUS BLD VENIPUNCTURE: CPT

## 2023-11-06 RX ORDER — ACETAMINOPHEN 500 MG
1000 TABLET ORAL EVERY 8 HOURS PRN
Status: DISCONTINUED | OUTPATIENT
Start: 2023-11-06 | End: 2023-11-06 | Stop reason: HOSPADM

## 2023-11-06 RX ORDER — ONDANSETRON 4 MG/1
4 TABLET, ORALLY DISINTEGRATING ORAL EVERY 8 HOURS PRN
Qty: 15 TABLET | Refills: 0 | Status: SHIPPED | OUTPATIENT
Start: 2023-11-06 | End: 2024-03-15

## 2023-11-06 RX ORDER — MIRTAZAPINE 30 MG/1
30 TABLET, FILM COATED ORAL NIGHTLY
Qty: 30 TABLET | Refills: 11 | Status: SHIPPED | OUTPATIENT
Start: 2023-11-06 | End: 2024-11-05

## 2023-11-06 RX ADMIN — CIPROFLOXACIN 400 MG: 2 INJECTION, SOLUTION INTRAVENOUS at 04:11

## 2023-11-06 RX ADMIN — ACETAMINOPHEN 1000 MG: 500 TABLET ORAL at 08:11

## 2023-11-06 RX ADMIN — POTASSIUM BICARBONATE 25 MEQ: 978 TABLET, EFFERVESCENT ORAL at 11:11

## 2023-11-06 RX ADMIN — METRONIDAZOLE 500 MG: 500 INJECTION, SOLUTION INTRAVENOUS at 12:11

## 2023-11-06 NOTE — PLAN OF CARE
[facility] ([facility])  Discharge Final Note    Primary Care Provider: Trina Vu MD    Expected Discharge Date: 11/6/2023    Final Discharge Note (most recent)       Final Note - 11/06/23 1609          Final Note    Assessment Type Final Discharge Note (P)      Anticipated Discharge Disposition Home or Self Care (P)         Post-Acute Status    Coverage M[employer] (P)      Discharge Delays None known at this time (P)                      Important Message from Medicare    Pt d/c'd home with .    HERNANDEZ EvansN, BS, RN, CCM

## 2023-11-06 NOTE — PLAN OF CARE
[facility] ([place] [address])      HOME HEALTH ORDERS  FACE TO FACE ENCOUNTER    Patient Name: Edita Riley  YOB: 1965    PCP: Trina Vu MD   PCP Address: [address] / [place] [address]  PCP Phone Number: [phone]  PCP Fax: [phone]    Encounter Date: 11/3/23    Admit to Home Health    Diagnoses:  Active Hospital Problems    Diagnosis  POA    Major depressive disorder, recurrent episode, moderate [F33.1]  Yes     Chronic    History of essential hypertension [Z86.79]  Not Applicable     Not requiring medications at this time  BP is low- concern that this may correlate with increased stool output from stoma. Encourage her to contact GI and her PCP.        Resolved Hospital Problems    Diagnosis Date Resolved POA    *Generalized abdominal pain [R10.84] 11/06/2023 Yes    UTI (urinary tract infection) [N39.0] 11/06/2023 Yes       Follow Up Appointments:  No future appointments.    Allergies:  Review of patient's allergies indicates:   Allergen Reactions    Bee sting [allergen ext-venom-honey bee]      Rash      Grass pollen-bermuda, standard      rash       Medications: Review discharge medications with patient and family and provide education.    Current Facility-Administered Medications   Medication Dose Route Frequency Provider Last Rate Last Admin    acetaminophen tablet 1,000 mg  1,000 mg Oral Q8H PRN Andriy Coley MD   1,000 mg at 11/06/23 0832    dextrose 10% bolus 125 mL 125 mL  12.5 g Intravenous PRN Farhud, Samer, DO        dextrose 10% bolus 250 mL 250 mL  25 g Intravenous PRN Leehud, Samer, DO        enoxaparin injection 40 mg  40 mg Subcutaneous Daily Farhud, Samer, DO   40 mg at 11/05/23 1703    glucagon (human recombinant) injection 1 mg  1 mg Intramuscular PRN Farhud, Samer, DO        glucose chewable tablet 16 g  16 g Oral PRN Farhud, Samer, DO        glucose chewable tablet 24 g  24 g Oral PRN Farhud, Samer, DO        mirtazapine  tablet 30 mg  30 mg Oral Nightly Alex Whiteside MD   30 mg at 11/05/23 2111    naloxone 0.4 mg/mL injection 0.02 mg  0.02 mg Intravenous PRN Jai Gonzalez,         sodium chloride 0.9% flush 10 mL  10 mL Intravenous Q12H PRN Jai Gonzalez, DO         Current Discharge Medication List        START taking these medications    Details   mirtazapine (REMERON) 30 MG tablet Take 1 tablet (30 mg total) by mouth nightly.  Qty: 30 tablet, Refills: 11      ondansetron (ZOFRAN-ODT) 4 MG TbDL Dissolve 1 tablet (4 mg total) by mouth every 8 (eight) hours as needed (nausea).  Qty: 15 tablet, Refills: 0           CONTINUE these medications which have NOT CHANGED    Details   loratadine (CLARITIN) 10 mg tablet Take 10 mg by mouth daily as needed for Allergies.           STOP taking these medications       amoxicillin (AMOXIL) 875 MG tablet Comments:   Reason for Stopping:                 I have seen and examined this patient within the last 30 days. My clinical findings that support the need for the home health skilled services and home bound status are the following:no   Medical restrictions requiring assistance of another human to leave home due to  Wound care needs.     Diet:   regular diet    Referrals/ Consults  Aide to provide assistance with personal care, ADLs, and vital signs.    Activities:   activity as tolerated    Nursing:   Agency to admit patient within 24 hours of hospital discharge unless specified on physician order or at patient request    SN to complete comprehensive assessment including routine vital signs. Instruct on disease process and s/s of complications to report to MD. Review/verify medication list sent home with the patient at time of discharge  and instruct patient/caregiver as needed. Frequency may be adjusted depending on start of care date.     Skilled nurse to perform up to 3 visits PRN for symptoms related to diagnosis    Notify MD if SBP > 160 or < 90; DBP > 90 or < 50; HR > 120 or < 50; Temp >  101; O2 < 88%;     Ok to schedule additional visits based on staff availability and patient request on consecutive days within the home health episode.    When multiple disciplines ordered:    Start of Care occurs on Sunday - Wednesday schedule remaining discipline evaluations as ordered on separate consecutive days following the start of care.    Thursday SOC -schedule subsequent evaluations Friday and Monday the following week.     Friday - Saturday SOC - schedule subsequent discipline evaluations on consecutive days starting Monday of the following week.    Miscellaneous   Routine Skin for Bedridden Patients: Instruct patient/caregiver to apply moisture barrier cream to all skin folds and wet areas in perineal area daily and after baths and all bowel movements.    Home Health Aide:  Nursing Three times weekly    Wound Care Orders  [place]:    nurse to perform wound care to buttocks:  Cleanse wound with soap and water  Pat dry.  Cover with a foam border (Mepilex)   Apply this weekly    I certify that this patient is confined to her home and needs intermittent skilled nursing care.

## 2023-11-06 NOTE — PLAN OF CARE
[facility] ([facility]-)  Discharge Reassessment    Primary Care Provider: Trina Vu MD    Expected Discharge Date: 11/6/2023    Reassessment (most recent)       Discharge Reassessment - 11/06/23 1443          Discharge Reassessment    Assessment Type Discharge Planning Reassessment (P)      Did the patient's condition or plan change since previous assessment? No (P)      Discharge Plan discussed with: Patient (P)      Communicated OK with patient/caregiver Yes (P)      Discharge Plan A Home with family (P)      Discharge Plan B Home with family (P)      DME Needed Upon Discharge  none (P)      Transition of Care Barriers Underinsured (P)         Post-Acute Status    Coverage M[employer] (P)      Discharge Delays None known at this time (P)                  CM spoke with pt in room.  Instructed that MD has written d/c orders.  Pt states that her  will give her a ride home, denies DME needs.  CM instructed in DC process.  MD wrote orders for HH, however, her insurance Medicaid does not cover HH.  MD notified.    ISABELLE Evans, BS, RN, CCM

## 2023-11-06 NOTE — ASSESSMENT & PLAN NOTE
58 y.o. 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 Abdominal exam is benign and labs reassuring, CT with some dilated small bowel but no patient has also had her normal amount of ileostomy output    - No acute surgical intervention recommend  - No contrast on follow KUB, however this could be that it completely traversed the bowel.   - Ok to advance to regular diet  - General surgery will sign off at this time. Please call with questions

## 2023-11-06 NOTE — DISCHARGE SUMMARY
[facility] ([address] Medicine  Discharge Summary      Patient Name: Edita Riley  MRN: [id number]  [facility]: [id number]  Patient Class: IP- Inpatient  Admission Date: 11/3/2023  Hospital Length of Stay: 2 days  Discharge Date and Time:  11/06/2023 4:34 PM  Attending Physician: Delma att. providers found   Discharging Provider: Andriy Coley MD  Primary Care Provider: Trina Vu MD  [facility] Medicine Team: [facility]P MED 2 Andriy Coley MD  Primary Care Team: [facility] MED 2    HPI:   Ms. Riley is a 59 y/o female with a PMH of b/t ureteral sticture secondary to radiation for cervical cancer, s/p open transverse colon conduit urinary diversion on 9/11/2020, MDD, b/t nephrostomy tubes ( right side tube has been removed, left was left in place), multiple episodes of pyelonephritis since August 2023 presenting to the ED due to abdominal pain, nausea, and vomiting that started today around 6 PM. Patient reports that she was going about her regular day and a sharp diffuse abdominal pain started around the afternoon. She reports that the pain became associated with nausea and severe vomiting (Yellow vomit). Patient reports that before it started today, she had no symptoms the day prior. She reports normal output from her ostomy bags and nephrostomy bags. She reports that no blood was apparent in the bags and nothing seems to have changes in regards to the output these past few days.  She denies any headaches, chest pain, SOB, hematemesis, flank pain, and blurry vision. Patient reports that she has not changed her diet recently. She also reports that she doesn't take any medication except for Amoxicillin which she was sent home with after recently being discharged from the hospital. She denies any sick contacts as well.     Patient was recently admitted to the hospital on 10/26/23 due to further management of a displaced left nephrostomy tube. Urology evaluated the patient on  admission and recommended IR consult for left nephrostomy tube placement. Interventional radiology placed a percutaneous nephrostomy tube on 10/26. She had a slight ODESSA which was treated with IV fluids and monitored. Her urinary tract infection ( E. Faecalis)  which was found on 10/21/23  was being treated while she was admitted on 10/26/23. She was transitioned from IV ampicillin to PO Amoxicillin regimen upon discharge with instructions to take medication for 10 more days to complete a total of 14 day course. She was nauseated while taking oral antibiotics which improved with oral antiemetics.     In the ED, patient is afebrile and HDS. WBC is 11.28. Hgb is 12.9. Lipase is wnl at 29. Lactic acid is wnl at 1.8. AST/ALT is wnl at 20/16. UA is positive for 3+ leukocytes,nitrites, many bacteria, and WBC >100. CT demonstrates mildly dilated loops from possible infectious/inflammatory source or pSBO. General surgery evaluated her in the ED and recommended No acute surgical intervention recommended NGT placement, NPO status, and mIVF.              * No surgery found *      Hospital Course:   In the ED, patient is afebrile and HDS. WBC is 11.28. Hgb is 12.9. Lipase is wnl at 29. Lactic acid is wnl at 1.8. AST/ALT is wnl at 20/16. UA is positive for 3+ leukocytes,nitrites, many bacteria, and WBC >100. CT demonstrates mildly dilated loops from possible infectious/inflammatory source or pSBO. General surgery evaluated her in the ED and recommended No acute surgical intervention recommended NGT placement, NPO status, and mIVF. Gastrografin ordered with xray at 4 and 8 hours post GG. Pass through not significant for SBO, advancing diet as tolerated. On 11/6 the patient was tolerating a regular diet without nausea. She was discharged with plans for home health wound care. While admitted the patient was resumed on her mirtazapine due to depression. Return precautions given. Plan for PCP follow-up.       Goals of Care Treatment  Preferences:  Code Status: Full Code    Vitals:    11/05/23 2303 11/06/23 0500 11/06/23 0757 11/06/23 1215   BP: (!) 129/55 117/60 114/66 (!) 110/58   BP Location: Left arm  Left arm Left arm   Patient Position: Lying Lying Lying Lying   Pulse: 71 70 73 71   Resp: 18 19 19 18   Temp: 97.7 °F (36.5 °C) 97.6 °F (36.4 °C) 97.9 °F (36.6 °C) 98.1 °F (36.7 °C)   TempSrc: Oral Oral Oral Oral   SpO2: 96% 97% 98% 99%   Weight:         Physical Exam  Constitutional:       Appearance: She is obese. She is ill-appearing.   HENT:      Head: Normocephalic and atraumatic.      Nose: Nose normal. No congestion.      Mouth/Throat:      Mouth: Mucous membranes are moist.      Pharynx: Oropharynx is clear.   Eyes:      Extraocular Movements: Extraocular movements intact.   Cardiovascular:      Rate and Rhythm: Normal rate and regular rhythm.      Pulses: Normal pulses.      Heart sounds: Normal heart sounds. No murmur heard.     No friction rub.   Pulmonary:      Effort: Pulmonary effort is normal. No respiratory distress.      Breath sounds: Normal breath sounds. No wheezing.   Chest:      Chest wall: No tenderness.   Abdominal: .      Palpations: Abdomen is soft.      Tenderness: There is no abdominal tenderness. There is no guarding or rebound.      Comments: colostomy bag with normal output   ileostomy bag with normal output   Genitourinary:     Comments: Left nephrostomy bag with normal urine output no discoloration. Tube seems to be in place with no signs of leakage or erythema   Musculoskeletal:         General: Normal range of motion.      Cervical back: No rigidity.   Skin:     General: Skin is warm.   Neurological:      General: No focal deficit present.      Mental Status: She is alert and oriented to person, place, and time.   Psychiatric:         Mood and Affect: Mood normal.         Behavior: Behavior normal.     Consults:   Consults (From admission, onward)        Status Ordering Provider     Inpatient consult to  Midline team  Once        Provider:  (Not yet assigned)    Completed KALIE HENDRICKS     Inpatient consult to General surgery  Once        Provider:  (Not yet assigned)    Completed NATHANIEL MACHADO          No new Assessment & Plan notes have been filed under this hospital service since the last note was generated.  Service: Hospital Medicine    Final Active Diagnoses:    Diagnosis Date Noted POA    Major depressive disorder, recurrent episode, moderate [F33.1] 06/02/2023 Yes     Chronic    History of essential hypertension [Z86.79] 05/04/2015 Not Applicable      Problems Resolved During this Admission:    Diagnosis Date Noted Date Resolved POA    PRINCIPAL PROBLEM:  Generalized abdominal pain [R10.84] 11/04/2023 11/06/2023 Yes    UTI (urinary tract infection) [N39.0] 10/26/2023 11/06/2023 Yes       Discharged Condition: fair    Disposition: Home or Self Care    Follow Up:   Follow-up Information     Trina Vu MD Follow up.    Specialty: Family Medicine  Contact information:  [address]  [place] [address] [phone]                       Patient Instructions:   No discharge procedures on file.    Significant Diagnostic Studies: Labs:   CMP   Recent Labs   Lab 11/05/23 0436 11/06/23  0511    140   K 3.7 3.6   * 109   CO2 18* 22*   GLU 90 82   BUN 20 14   CREATININE 1.4 1.3   CALCIUM 10.0 9.2   PROT 7.7 7.0   ALBUMIN 3.1* 2.9*   BILITOT 0.3 0.3   ALKPHOS 124 111   AST 21 17   ALT 19 17   ANIONGAP 12 9    and CBC   Recent Labs   Lab 11/05/23 0436 11/06/23  0511   WBC 6.30 6.56   HGB 12.3 11.0*   HCT 40.7 36.9*    363       Pending Diagnostic Studies:     None         Medications:  Reconciled Home Medications:      Medication List      START taking these medications    mirtazapine 30 MG tablet  Commonly known as: REMERON  Take 1 tablet (30 mg total) by mouth nightly.     ondansetron 4 MG Tbdl  Commonly known as: ZOFRAN-ODT  Dissolve 1 tablet (4 mg total) by mouth every 8  (eight) hours as needed (nausea).        CONTINUE taking these medications    loratadine 10 mg tablet  Commonly known as: CLARITIN  Take 10 mg by mouth daily as needed for Allergies.        STOP taking these medications    amoxicillin 875 MG tablet  Commonly known as: AMOXIL            Indwelling Lines/Drains at time of discharge:   Lines/Drains/Airways     Drain  Duration                Nephrostomy Left -- days         Urostomy 09/11/20 0000 ileal conduit LLQ 1151 days         Ileostomy 09/18/20 RLQ 1144 days         Nephrostomy 10/26/23 0933 Left 12 Fr. 11 days                Time spent on the discharge of patient: 35 minutes         Andriy Coley MD  Department of Hospital Medicine  [facility] - Intensive Care ([place]-16)

## 2023-11-06 NOTE — PLAN OF CARE
Problem: Adult Inpatient Plan of Care  Goal: Plan of Care Review  Outcome: Ongoing, Progressing  Goal: Patient-Specific Goal (Individualized)  Outcome: Ongoing, Progressing  Goal: Absence of Hospital-Acquired Illness or Injury  Outcome: Ongoing, Progressing  Goal: Optimal Comfort and Wellbeing  Outcome: Ongoing, Progressing  Goal: Readiness for Transition of Care  Outcome: Ongoing, Progressing     Problem: Fluid and Electrolyte Imbalance (Acute Kidney Injury/Impairment)  Goal: Fluid and Electrolyte Balance  Outcome: Ongoing, Progressing     Problem: Oral Intake Inadequate (Acute Kidney Injury/Impairment)  Goal: Optimal Nutrition Intake  Outcome: Ongoing, Progressing     Problem: Renal Function Impairment (Acute Kidney Injury/Impairment)  Goal: Effective Renal Function  Outcome: Ongoing, Progressing     Problem: Skin Injury Risk Increased  Goal: Skin Health and Integrity  Outcome: Ongoing, Progressing     Problem: Impaired Wound Healing  Goal: Optimal Wound Healing  Outcome: Ongoing, Progressing    Pt had an uneventful night. VSS. Pt denied n/v. POC reviewed with pt. Pt verbalized understanding. Pt is free of falls and injuries. Bed in lowest position and call light within reach. Safety maintained

## 2023-11-06 NOTE — SUBJECTIVE & OBJECTIVE
Interval History: No acute events overnight. HDS on RA. Denies nausea and vomiting. Gas and stool in ostomy bag.     Medications:  Continuous Infusions:  Scheduled Meds:   enoxparin  40 mg Subcutaneous Daily    mirtazapine  30 mg Oral Nightly     PRN Meds:dextrose 10%, dextrose 10%, glucagon (human recombinant), glucose, glucose, naloxone, sodium chloride 0.9%     Review of patient's allergies indicates:   Allergen Reactions    Bee sting [allergen ext-venom-honey bee]      Rash      Grass pollen-bermuda, standard      rash     Objective:     Vital Signs (Most Recent):  Temp: 97.6 °F (36.4 °C) (11/06/23 0500)  Pulse: 70 (11/06/23 0500)  Resp: 19 (11/06/23 0500)  BP: 117/60 (11/06/23 0500)  SpO2: 97 % (11/06/23 0500) Vital Signs (24h Range):  Temp:  [96.6 °F (35.9 °C)-98.7 °F (37.1 °C)] 97.6 °F (36.4 °C)  Pulse:  [70-82] 70  Resp:  [16-19] 19  SpO2:  [94 %-98 %] 97 %  BP: (111-129)/(55-74) 117/60     Weight: 83.9 kg (185 lb)  Body mass index is 27.32 kg/m².    Intake/Output - Last 3 Shifts         11/04 0700  11/05 0659 11/05 0700 11/06 0659 11/06 0700 11/07 0659    P.O. 0 0     IV Piggyback       Total Intake(mL/kg) 0 (0) 0 (0)     Urine (mL/kg/hr) 810 (0.4) 830 (0.4)     Stool 2050 1560     Total Output 2860 2390     Net -2860 -2390                     Physical Exam  Vitals reviewed.   Constitutional:       General: She is not in acute distress.     Appearance: She is well-developed.   HENT:      Head: Normocephalic and atraumatic.   Eyes:      General:         Right eye: No discharge.         Left eye: No discharge.      Conjunctiva/sclera: Conjunctivae normal.   Cardiovascular:      Rate and Rhythm: Normal rate and regular rhythm.   Pulmonary:      Effort: Pulmonary effort is normal. No respiratory distress.   Abdominal:      General: There is no distension.      Palpations: Abdomen is soft.      Tenderness: There is no abdominal tenderness. There is no guarding or rebound.      Comments: Abdomen is soft and  non-tender, urinary conduit in LLQ with light yellow urine present, ileostomy in R mid abdomen with both formed and liquid stool   Musculoskeletal:         General: No deformity. Normal range of motion.      Cervical back: Normal range of motion.   Skin:     General: Skin is warm and dry.   Neurological:      Mental Status: She is alert and oriented to person, place, and time.   Psychiatric:         Behavior: Behavior normal.          Significant Labs:  I have reviewed all pertinent lab results within the past 24 hours.  CBC:   Recent Labs   Lab 11/06/23  0511   WBC 6.56   RBC 4.04   HGB 11.0*   HCT 36.9*      MCV 91   MCH 27.2   MCHC 29.8*     CMP:   Recent Labs   Lab 11/06/23  0511   GLU 82   CALCIUM 9.2   ALBUMIN 2.9*   PROT 7.0      K 3.6   CO2 22*      BUN 14   CREATININE 1.3   ALKPHOS 111   ALT 17   AST 17   BILITOT 0.3       Significant Diagnostics:  I have reviewed all pertinent imaging results/findings within the past 24 hours.

## 2023-11-06 NOTE — PROGRESS NOTES
"[facility] ([place] [address])  General Surgery  Progress Note    Subjective:     History of Present Illness:  Edita Riley is a 58 y.o. 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 This onset around 6 pm yesterday. It is located diffusely. She notes nausea with multiple episodes of emesis prior to arrival. She has had "normal" ileostomy output, not sure of volume. She has also had two recent admissions in Sep 2023 for abdominal pain and Oct 2023 for a dislodged neph tube requiring exchange.   She is afebrile and hemodynamically stable. Labs demonstrate WBC and lactate within normal limits, UA consistent with UTI, remainder of labs relatively unremarkable. CT demonstrates mildly dilated loops from possible infectious/inflammatory source or pSBO.       Post-Op Info:  * No surgery found *         Interval History: No acute events overnight. HDS on RA. Denies nausea and vomiting. Gas and stool in ostomy bag.     Medications:  Continuous Infusions:  Scheduled Meds:   enoxparin  40 mg Subcutaneous Daily    mirtazapine  30 mg Oral Nightly     PRN Meds:dextrose 10%, dextrose 10%, glucagon (human recombinant), glucose, glucose, naloxone, sodium chloride 0.9%     Review of patient's allergies indicates:   Allergen Reactions    Bee sting [allergen ext-venom-honey bee]      Rash      Grass pollen-bermuda, standard      rash     Objective:     Vital Signs (Most Recent):  Temp: 97.6 °F (36.4 °C) (11/06/23 0500)  Pulse: 70 (11/06/23 0500)  Resp: 19 (11/06/23 0500)  BP: 117/60 (11/06/23 0500)  SpO2: 97 % (11/06/23 0500) Vital Signs (24h Range):  Temp:  [96.6 °F " (35.9 °C)-98.7 °F (37.1 °C)] 97.6 °F (36.4 °C)  Pulse:  [70-82] 70  Resp:  [16-19] 19  SpO2:  [94 %-98 %] 97 %  BP: (111-129)/(55-74) 117/60     Weight: 83.9 kg (185 lb)  Body mass index is 27.32 kg/m².    Intake/Output - Last 3 Shifts         11/04 0700 11/05 0659 11/05 0700 11/06 0659 11/06 0700 11/07 0659    P.O. 0 0     IV Piggyback       Total Intake(mL/kg) 0 (0) 0 (0)     Urine (mL/kg/hr) 810 (0.4) 830 (0.4)     Stool 2050 1560     Total Output 2860 2390     Net -2860 -2390                     Physical Exam  Vitals reviewed.   Constitutional:       General: She is not in acute distress.     Appearance: She is well-developed.   HENT:      Head: Normocephalic and atraumatic.   Eyes:      General:         Right eye: No discharge.         Left eye: No discharge.      Conjunctiva/sclera: Conjunctivae normal.   Cardiovascular:      Rate and Rhythm: Normal rate and regular rhythm.   Pulmonary:      Effort: Pulmonary effort is normal. No respiratory distress.   Abdominal:      General: There is no distension.      Palpations: Abdomen is soft.      Tenderness: There is no abdominal tenderness. There is no guarding or rebound.      Comments: Abdomen is soft and non-tender, urinary conduit in LLQ with light yellow urine present, ileostomy in R mid abdomen with both formed and liquid stool   Musculoskeletal:         General: No deformity. Normal range of motion.      Cervical back: Normal range of motion.   Skin:     General: Skin is warm and dry.   Neurological:      Mental Status: She is alert and oriented to person, place, and time.   Psychiatric:         Behavior: Behavior normal.          Significant Labs:  I have reviewed all pertinent lab results within the past 24 hours.  CBC:   Recent Labs   Lab 11/06/23  0511   WBC 6.56   RBC 4.04   HGB 11.0*   HCT 36.9*      MCV 91   MCH 27.2   MCHC 29.8*     CMP:   Recent Labs   Lab 11/06/23  0511   GLU 82   CALCIUM 9.2   ALBUMIN 2.9*   PROT 7.0      K 3.6    CO2 22*      BUN 14   CREATININE 1.3   ALKPHOS 111   ALT 17   AST 17   BILITOT 0.3       Significant Diagnostics:  I have reviewed all pertinent imaging results/findings within the past 24 hours.  Assessment/Plan:     * Generalized abdominal pain  58 y.o. 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 Abdominal exam is benign and labs reassuring, CT with some dilated small bowel but no patient has also had her normal amount of ileostomy output    - No acute surgical intervention recommend  - No contrast on follow KUB, however this could be that it completely traversed the bowel.   - Ok to advance to regular diet  - General surgery will sign off at this time. Please call with questions        Andria Ulloa MD  General Surgery  [facility] - [facility] ([place]-[facility])

## 2023-11-06 NOTE — CONSULTS
[facility] ([place] [address])  Wound Care    Patient Name:  Edita Riley   MRN:  [id number]  Date: 11/6/2023  Diagnosis: Generalized abdominal pain    History:     Past Medical History:   Diagnosis Date    Abnormal mammogram 08/25/2020    Acute blood loss anemia     Acute deep vein thrombosis (DVT) of lower extremity 12/09/2020    Advance care planning 04/30/2021    Anemia due to chronic blood loss     Anemia due to chronic kidney disease     Anxiety     Bilateral ureteral obstruction 9/11/2020    Cardiovascular event risk -- low 09/14/2015    ASCVD 10-year risk 1.9% (with optimal risk factors 1.3%) as of 9/14/2015     Cervical cancer 2014    Chronic back pain     Colostomy care     Deep vein thrombosis     Depression     Diarrhea due to malabsorption 11/14/2018    Difficult intubation     Disorder of kidney and ureter     DVT of lower extremity, bilateral 11/04/2020    Emphysema of lung 4/10/2023    Fibromyalgia     Fungemia 09/27/2020    Generalized abdominal pain 08/25/2020    GERD (gastroesophageal reflux disease)     Hemifacial spasm 09/16/2015    Hiatal hernia 2014    History of cervical cancer 10/11/2018    Hx of psychiatric care     Cymbalta, trazodone    Hypertension     Hypomagnesemia 11/21/2018    Lactose intolerance     Metastatic squamous cell carcinoma to lymph node 10/11/2018    Neuropathy due to chemotherapeutic drug 11/14/2018    Osteoarthritis of back     Peritonitis 09/22/2020    Pseudomonas urinary tract infection 04/21/2021    Psychiatric problem     Refusal of blood transfusions as patient is Buddhism     Estephanieki's ring 09/14/2015    Seen on outside EGD 05/2014, underwent esophageal dilatation. Bx were negative.     Seizures     Sleep stage dysfunction     Noted on PSG 06/2017; negative for obstructive sleep apnea     Stroke     Urinary tract infection associated with nephrostomy catheter 06/11/2020    Wound infection after surgery 09/24/2020       Social History      Socioeconomic History    Marital status:      Spouse name: Hammad    Number of children: 2   Tobacco Use    Smoking status: Never    Smokeless tobacco: Never   Substance and Sexual Activity    Alcohol use: No     Alcohol/week: 0.0 standard drinks of alcohol    Drug use: No    Sexual activity: Yes     Partners: Male     Birth control/protection: None     Comment:  19 years since    Social History Narrative    , twin daughters (1  2018), disabled due to childhood stroke, [employer]     Social Determinants of Health     Financial Resource Strain: Low Risk  (2023)    Overall Financial Resource Strain (CARDIA)     Difficulty of Paying Living Expenses: Not hard at all   Recent Concern: Financial Resource Strain - Medium Risk (2023)    Overall Financial Resource Strain (CARDIA)     Difficulty of Paying Living Expenses: Somewhat hard   Food Insecurity: No Food Insecurity (2023)    Hunger Vital Sign     Worried About Running Out of Food in the Last Year: Never true     Ran Out of Food in the Last Year: Never true   Transportation Needs: No Transportation Needs (2023)    PRAPARE - Transportation     Lack of Transportation (Medical): No     Lack of Transportation (Non-Medical): No   Physical Activity: Insufficiently Active (2023)    Exercise Vital Sign     Days of Exercise per Week: 4 days     Minutes of Exercise per Session: 20 min   Stress: Stress Concern Present (2023)    [employer] [place] [employer] [employer] - Occupational Stress Questionnaire     Feeling of Stress : To some extent   Social Connections: Socially Integrated (2023)    Social Connection and Isolation Panel [NHANES]     Frequency of Communication with Friends and Family: Three times a week     Frequency of Social Gatherings with Friends and Family: Once a week     Attends Adventist Services: 1 to 4 times per year     Active Member of Clubs or Organizations: No      Attends Club or Organization Meetings: 1 to 4 times per year     Marital Status:    Recent Concern: Social Connections - Moderately Isolated (9/11/2023)    Social Connection and Isolation Panel [NHANES]     Frequency of Communication with Friends and Family: Three times a week     Frequency of Social Gatherings with Friends and Family: Three times a week     Attends Latter day Services: Never     Active Member of Clubs or Organizations: No     Attends Club or Organization Meetings: Never     Marital Status:    Housing Stability: Low Risk  (11/4/2023)    Housing Stability Vital Sign     Unable to Pay for Housing in the Last Year: No     Number of Places Lived in the Last Year: 1     Unstable Housing in the Last Year: No       Precautions:     Allergies as of 11/03/2023 - Reviewed 11/03/2023   Allergen Reaction Noted    Bee sting [allergen ext-venom-honey bee]  05/04/2015    Grass pollen-bermuda, standard  05/04/2015       [facility] Assessment Details/Treatment   Patient seen for wound care consultation to buttocks.   Reviewed chart for this encounter.   See Flow Sheet for findings.      Per chart review.  Ms. Riley is a 59 y/o female with a PMH of b/t ureteral sticture secondary to radiation for cervical cancer, s/p open transverse colon conduit urinary diversion on 9/11/2020, MDD, b/t nephrostomy tubes ( right side tube has been removed, left was left in place), multiple episodes of pyelonephritis since August 2023 presenting to the ED due to abdominal pain, nausea, and vomiting that started today around 6 PM. Patient reports that she was going about her regular day and a sharp diffuse abdominal pain started around the afternoon. She reports that the pain became associated with nausea and severe vomiting (Yellow vomit). Wound etiology unknown at this time, wound care to recommend [facility] VAISHNAVI. Wound care applied a Mepilex to the buttocks to prevent friction and shearing. Heels clear at this time.  Wound care to also recommend a waffle cushion d/t pt complaining of bed hurting. Pt also mentioning siting in a recliner, wound care to also recommend a wheel chair cushion.  Pt denied any needs at this time.         RECOMMENDATIONS  Recommendations made to primary team for above plan via secured chat. Wound Care to follow up. Orders placed.     Discussed POC with patient and primary RN.   See EMR for orders & patient education.  Discussed POC with primary team.  VAISHNAVI Notified.    Nursing to continue care.  Nursing to maintain pressure injury prevention interventions.  Contact wound care for any further questions.       11/06/23 1100   WOCN Assessment   WOCN Total Time (mins) 30   Visit Date 11/06/23   Visit Time 1100   Consult Type New   WOCN Speciality Wound   Intervention assessed;changed;applied;chart review;orders   Teaching on-going        Altered Skin Integrity 11/04/23 0500 Right medial Buttocks   Date First Assessed/Time First Assessed: 11/04/23 0500   Altered Skin Integrity Present on Admission - Did Patient arrive to the hospital with altered skin?: yes  Side: Right  Orientation: medial  Location: Buttocks  Is this injury device related?: No   Wound Image    Dressing Appearance Dry;Intact;Clean   Drainage Amount None   Appearance Intact;Pink;Dry   Dressing Applied;Foam   Dressing Change Due 11/13/23 11/06/2023

## 2023-11-07 ENCOUNTER — TELEPHONE (OUTPATIENT)
Dept: PREADMISSION TESTING | Facility: HOSPITAL | Age: 58
End: 2023-11-07
Payer: MEDICAID

## 2023-11-07 LAB — BACTERIA UR CULT: ABNORMAL

## 2023-11-07 NOTE — ANESTHESIA PAT ROS NOTE
11/07/2023  Edita Riley is a 58 y.o., female.      Pre-op Assessment          Review of Systems           Anesthesia Assessment: Preoperative EQUATION    Planned Procedure: Procedure(s) (LRB):  REVISION, ANASTOMOSIS, URETEROILEAL (N/A)  OPEN LAPAROTOMY, EXPLORATORY (N/A)  Requested Anesthesia Type:General  Surgeon: Leslie Balbuena MD  Service: Urology  Known or anticipated Date of Surgery:11/28/2023    Surgeon notes: reviewed    Previous anesthesia records:GETA, MAC, Difficult airway, and Difficult intubation (most recent GETA Martinez 3 with grade I view)  4/21/2023 Percutaneous Nephrolithotomy  Airway:  Mallampati: II   Mouth Opening: Normal  TM Distance: Normal  Tongue: Normal  Neck ROM: Normal ROM    04/21/23 Placement Time: 1218 , created via procedure documentation Method of Intubation: Video Laryngoscopy Mask Ventilation: Easy Intubated: Postinduction Blade: Martinez #3 Airway Device Size: 7.5 , leak in 7.0 cuff, replaced with 7.5 Placement Verified By: Capnometry Complicating Factors: None Findings Post-Intubation: Bilateral breath sounds;Atraumatic/Condition of teeth unchanged Secured at: Lips Complications: None     Last PCP note: within 3 months , within [facility]   Subspecialty notes: Gyn/ONC, Urology, CRS    Other important co-morbidities: GERD, HTN, and Osteoarthritis, Hx DVT, Hx Sz, Hx Stroke, Emphysema, CKD3       Tests already available:  Available tests,  within 1 month , within [facility] .  11/6/2023 CBC, CMP  11/3/2023 EKG  9/11/2023 PT/INR      Optimization:  Anesthesia Preop Clinic Assessment  Indicated.    Medical Opinion Indicated.           Plan:    Testing:  None Needed   Pre-anesthesia  visit       Visit focus: concerns in complex and/or prolonged anesthesia, position other than supine, past history of problem with anesthesia, patient who requires bloodless surgery  (Jehovah Witness)     Consultation:IM Perioperative Hospitalist     Patient  has previously scheduled Medical Appointment: Not at this time prior to surgery    Navigation: Tests Scheduled.              Consults scheduled.             Results will be tracked by Preop Clinic.    Reviewed  previous anesthesia records with Dr. Contreras.

## 2023-11-14 ENCOUNTER — TELEPHONE (OUTPATIENT)
Dept: PREADMISSION TESTING | Facility: HOSPITAL | Age: 58
End: 2023-11-14
Payer: MEDICAID

## 2023-11-15 ENCOUNTER — TELEPHONE (OUTPATIENT)
Dept: PREADMISSION TESTING | Facility: HOSPITAL | Age: 58
End: 2023-11-15
Payer: MEDICAID

## 2023-11-17 ENCOUNTER — TELEPHONE (OUTPATIENT)
Dept: PREADMISSION TESTING | Facility: HOSPITAL | Age: 58
End: 2023-11-17
Payer: MEDICAID

## 2023-11-17 ENCOUNTER — TELEPHONE (OUTPATIENT)
Dept: UROLOGY | Facility: CLINIC | Age: 58
End: 2023-11-17
Payer: MEDICAID

## 2023-11-17 NOTE — TELEPHONE ENCOUNTER
----- Message from Chantal Plata RN sent at 11/17/2023  1:10 PM CST -----  Good afternoon!    Our Scheduling Department has been attempting to reach the above referenced patient to schedule her PreOp appointments and clearances for her upcoming surgery with Dr. Balbuena on 11/28 and have been unsuccessful in reaching her at any of the  numbers listed in the chart.  We would have reached out to her on her My Chart portal, but it is currently pending.      Can you please try to contact her and have her call our scheduling desk at [phone] to get her scheduled?  Additionally she is a Alevism, so we really should have her seen in clinic to be sure she is optimized.    Thank you,  Chantal Plata, AMY  Anesthesia [facility], [facility]

## 2023-11-21 ENCOUNTER — TELEPHONE (OUTPATIENT)
Dept: PREADMISSION TESTING | Facility: HOSPITAL | Age: 58
End: 2023-11-21
Payer: MEDICAID

## 2023-11-21 PROBLEM — N18.9 ANEMIA IN CHRONIC KIDNEY DISEASE: Status: ACTIVE | Noted: 2020-05-18

## 2023-11-21 PROBLEM — D63.1 ANEMIA IN CHRONIC KIDNEY DISEASE: Status: ACTIVE | Noted: 2020-05-18

## 2023-11-21 RX ORDER — SODIUM BICARBONATE 650 MG/1
650 TABLET ORAL 3 TIMES DAILY
Status: ON HOLD | COMMUNITY
End: 2023-12-13 | Stop reason: SDUPTHER

## 2023-11-21 NOTE — ASSESSMENT & PLAN NOTE
GFR 47  Stages of CKD discussed  Deleterious effects NSAID's , Beneficial effects of Hydration discussed   Tylenol as needed for pain   I suggest monitoring renal function, in put and out put status socorro-operatively. I  suggest avoiding nephrotoxic medication including NSAIDs, COX2 inhibitors, intravenous contrast agent,avoiding hypotension to prevent further renal impairment.

## 2023-11-22 ENCOUNTER — TELEPHONE (OUTPATIENT)
Dept: INTERNAL MEDICINE | Facility: CLINIC | Age: 58
End: 2023-11-22
Payer: MEDICAID

## 2023-11-27 ENCOUNTER — OFFICE VISIT (OUTPATIENT)
Dept: INTERNAL MEDICINE | Facility: CLINIC | Age: 58
End: 2023-11-27
Payer: MEDICAID

## 2023-11-27 VITALS
HEART RATE: 95 BPM | WEIGHT: 183 LBS | BODY MASS INDEX: 27.11 KG/M2 | TEMPERATURE: 98 F | DIASTOLIC BLOOD PRESSURE: 74 MMHG | SYSTOLIC BLOOD PRESSURE: 120 MMHG | OXYGEN SATURATION: 98 % | HEIGHT: 69 IN

## 2023-11-27 DIAGNOSIS — Z93.2 ILEOSTOMY IN PLACE: Chronic | ICD-10-CM

## 2023-11-27 DIAGNOSIS — E87.1 HYPONATREMIA: ICD-10-CM

## 2023-11-27 DIAGNOSIS — D63.1 ANEMIA IN CHRONIC KIDNEY DISEASE, UNSPECIFIED CKD STAGE: ICD-10-CM

## 2023-11-27 DIAGNOSIS — F33.3 SEVERE EPISODE OF RECURRENT MAJOR DEPRESSIVE DISORDER, WITH PSYCHOTIC FEATURES: Chronic | ICD-10-CM

## 2023-11-27 DIAGNOSIS — I10 ESSENTIAL HYPERTENSION: ICD-10-CM

## 2023-11-27 DIAGNOSIS — N12 PYELITIS: ICD-10-CM

## 2023-11-27 DIAGNOSIS — Z86.718 HISTORY OF DVT (DEEP VEIN THROMBOSIS): Chronic | ICD-10-CM

## 2023-11-27 DIAGNOSIS — Z93.6 PRESENCE OF UROSTOMY: ICD-10-CM

## 2023-11-27 DIAGNOSIS — J43.9 PULMONARY EMPHYSEMA, UNSPECIFIED EMPHYSEMA TYPE: ICD-10-CM

## 2023-11-27 DIAGNOSIS — R94.31 QT PROLONGATION: ICD-10-CM

## 2023-11-27 DIAGNOSIS — C77.9 METASTATIC SQUAMOUS CELL CARCINOMA TO LYMPH NODE: Chronic | ICD-10-CM

## 2023-11-27 DIAGNOSIS — Z86.59 HISTORY OF SUICIDAL IDEATION: ICD-10-CM

## 2023-11-27 DIAGNOSIS — Z93.6 NEPHROSTOMY STATUS: Chronic | ICD-10-CM

## 2023-11-27 DIAGNOSIS — E44.0 MODERATE MALNUTRITION: ICD-10-CM

## 2023-11-27 DIAGNOSIS — N18.9 ANEMIA IN CHRONIC KIDNEY DISEASE, UNSPECIFIED CKD STAGE: ICD-10-CM

## 2023-11-27 DIAGNOSIS — N18.31 STAGE 3A CHRONIC KIDNEY DISEASE: Primary | ICD-10-CM

## 2023-11-27 PROBLEM — Z75.8 DISCHARGE PLANNING ISSUES: Status: RESOLVED | Noted: 2021-04-30 | Resolved: 2023-11-27

## 2023-11-27 PROBLEM — N17.9 AKI (ACUTE KIDNEY INJURY): Status: RESOLVED | Noted: 2020-03-21 | Resolved: 2023-11-27

## 2023-11-27 PROBLEM — N99.528 NEPHROSTOMY COMPLICATION: Status: RESOLVED | Noted: 2023-10-26 | Resolved: 2023-11-27

## 2023-11-27 PROBLEM — R53.81 PHYSICAL DECONDITIONING: Status: RESOLVED | Noted: 2021-04-30 | Resolved: 2023-11-27

## 2023-11-27 PROBLEM — R60.9 EDEMA: Status: RESOLVED | Noted: 2023-04-10 | Resolved: 2023-11-27

## 2023-11-27 PROBLEM — R11.14 BILIOUS VOMITING WITH NAUSEA: Status: RESOLVED | Noted: 2023-06-11 | Resolved: 2023-11-27

## 2023-11-27 PROBLEM — F33.1 MODERATE EPISODE OF RECURRENT MAJOR DEPRESSIVE DISORDER: Status: RESOLVED | Noted: 2021-04-21 | Resolved: 2023-11-27

## 2023-11-27 PROBLEM — R92.8 ABNORMAL MAMMOGRAM: Status: RESOLVED | Noted: 2020-08-25 | Resolved: 2023-11-27

## 2023-11-27 PROBLEM — Z02.9 DISCHARGE PLANNING ISSUES: Status: RESOLVED | Noted: 2021-04-30 | Resolved: 2023-11-27

## 2023-11-27 PROBLEM — F33.1 MAJOR DEPRESSIVE DISORDER, RECURRENT EPISODE, MODERATE: Chronic | Status: RESOLVED | Noted: 2023-06-02 | Resolved: 2023-11-27

## 2023-11-27 PROBLEM — R56.9 SEIZURE-LIKE ACTIVITY: Status: RESOLVED | Noted: 2018-12-02 | Resolved: 2023-11-27

## 2023-11-27 PROCEDURE — 99999 PR PBB SHADOW E&M-EST. PATIENT-LVL III: ICD-10-PCS | Mod: PBBFAC,,, | Performed by: NURSE PRACTITIONER

## 2023-11-27 PROCEDURE — 1111F PR DISCHARGE MEDS RECONCILED W/ CURRENT OUTPATIENT MED LIST: ICD-10-PCS | Mod: CPTII,,, | Performed by: NURSE PRACTITIONER

## 2023-11-27 PROCEDURE — 1159F PR MEDICATION LIST DOCUMENTED IN MEDICAL RECORD: ICD-10-PCS | Mod: CPTII,,, | Performed by: NURSE PRACTITIONER

## 2023-11-27 PROCEDURE — 99215 OFFICE O/P EST HI 40 MIN: CPT | Mod: S$PBB,,, | Performed by: NURSE PRACTITIONER

## 2023-11-27 PROCEDURE — 3008F PR BODY MASS INDEX (BMI) DOCUMENTED: ICD-10-PCS | Mod: CPTII,,, | Performed by: NURSE PRACTITIONER

## 2023-11-27 PROCEDURE — 3008F BODY MASS INDEX DOCD: CPT | Mod: CPTII,,, | Performed by: NURSE PRACTITIONER

## 2023-11-27 PROCEDURE — 3074F PR MOST RECENT SYSTOLIC BLOOD PRESSURE < 130 MM HG: ICD-10-PCS | Mod: CPTII,,, | Performed by: NURSE PRACTITIONER

## 2023-11-27 PROCEDURE — 3078F DIAST BP <80 MM HG: CPT | Mod: CPTII,,, | Performed by: NURSE PRACTITIONER

## 2023-11-27 PROCEDURE — 1159F MED LIST DOCD IN RCRD: CPT | Mod: CPTII,,, | Performed by: NURSE PRACTITIONER

## 2023-11-27 PROCEDURE — 1111F DSCHRG MED/CURRENT MED MERGE: CPT | Mod: CPTII,,, | Performed by: NURSE PRACTITIONER

## 2023-11-27 PROCEDURE — 99215 PR OFFICE/OUTPT VISIT, EST, LEVL V, 40-54 MIN: ICD-10-PCS | Mod: S$PBB,,, | Performed by: NURSE PRACTITIONER

## 2023-11-27 PROCEDURE — 99417 PROLNG OP E/M EACH 15 MIN: CPT | Mod: S$PBB,,, | Performed by: NURSE PRACTITIONER

## 2023-11-27 PROCEDURE — 99999 PR PBB SHADOW E&M-EST. PATIENT-LVL III: CPT | Mod: PBBFAC,,, | Performed by: NURSE PRACTITIONER

## 2023-11-27 PROCEDURE — 3074F SYST BP LT 130 MM HG: CPT | Mod: CPTII,,, | Performed by: NURSE PRACTITIONER

## 2023-11-27 PROCEDURE — 99213 OFFICE O/P EST LOW 20 MIN: CPT | Mod: PBBFAC | Performed by: NURSE PRACTITIONER

## 2023-11-27 PROCEDURE — 99417 PR PROLONGED SVC, OUTPT, W/WO DIRECT PT CONTACT,  EA ADDTL 15 MIN: ICD-10-PCS | Mod: S$PBB,,, | Performed by: NURSE PRACTITIONER

## 2023-11-27 PROCEDURE — 3078F PR MOST RECENT DIASTOLIC BLOOD PRESSURE < 80 MM HG: ICD-10-PCS | Mod: CPTII,,, | Performed by: NURSE PRACTITIONER

## 2023-11-27 NOTE — ASSESSMENT & PLAN NOTE
Vent. Rate : 074 BPM     Atrial Rate : 074 BPM     P-R Int : 154 ms          QRS Dur : 078 ms      QT Int : 388 ms       P-R-T Axes : 079 081 084 degrees     QTc Int : 430 ms    Normal sinus rhythm  Normal ECG  When compared with ECG of 09-SEP-2023 13:38,  The axis Shifted left  T wave amplitude has decreased in Anterior leads  Confirmed by TONY RAMSAY, HOMEYAR ([id number]) on 11/4/2023 10:47:35 AM        Specimen Collected: 11/03/23 20:57

## 2023-11-27 NOTE — ASSESSMENT & PLAN NOTE
DVT in 2020, likely provoked due to prolonged/recurrent hospitalizations. Previously treated with Eliquis, no longer taking.      - DVT ppx w heparin  - held for IR procedure 9/11/23, restarted that evening    DVT prophylaxis: compression stockings, sequential  compression devices and anti coagulation as per Surgical team   Straight, Heterosexual

## 2023-11-27 NOTE — PROGRESS NOTES
[facility]  [facility] Note    Patient Name: Edita Riley  MRN: [id number]  Date of Evaluation- 11/27/2023  PCP- Trina Vu MD        HPI:  This is a 58 y.o. female  who presents today for a preoperative evaluation in preparation for revisionureteralileal   Patient is new to me.    The history has been obtained by speaking with the patient and reviewing the electronic medical record and/or outside health information. Significant health conditions for the perioperative period are discussed below in assessment and plan.     Patient reports current health status to be Fair.  Denies any new symptoms before surgery.       Subjective/ Objective:     Chief Complaint: Preoperative evaulation, perioperative medical management, and complication reduction plan.     Functional Capacity:  Able to climb a flight of stairs without CP SOB or Syncope.  Able to meet 4 METs      Anesthesia issues: None    Difficulty mouth opening: none    Steroid use in the last 12 months: none     Dental Issues:none    Family anesthesia difficulty: None       Family Hx of Thrombosis none    Past Medical History:   Diagnosis Date    Abnormal mammogram 08/25/2020    Abnormal mammogram 08/25/2020    Acute blood loss anemia     Acute deep vein thrombosis (DVT) of lower extremity 12/09/2020    Advance care planning 04/30/2021    ODESSA (acute kidney injury) 03/21/2020    Anemia due to chronic blood loss     Anemia due to chronic kidney disease     Anemia due to chronic kidney disease 05/18/2020    Anxiety     Bilateral ureteral obstruction 09/11/2020    Bilious vomiting with nausea 06/11/2023    Cardiovascular event risk -- low 09/14/2015    ASCVD 10-year risk 1.9% (with optimal risk factors 1.3%) as of 9/14/2015     Cervical cancer 2014    Chronic back pain     Colostomy care     Deep vein thrombosis     Depression     Diarrhea due to malabsorption 11/14/2018    Difficult intubation     Discharge planning issues  04/30/2021    Disorder of kidney and ureter     DVT of lower extremity, bilateral 11/04/2020    Edema 04/10/2023    Emphysema of lung 04/10/2023    Fibromyalgia     Fungemia 09/27/2020    Generalized abdominal pain 08/25/2020    GERD (gastroesophageal reflux disease)     Hemifacial spasm 09/16/2015    Hiatal hernia 2014    History of cervical cancer 10/11/2018    History of essential hypertension 05/04/2015    Not requiring medications at this time  BP is low- concern that this may correlate with increased stool output from stoma. Encourage her to contact GI and her PCP.    Hx of psychiatric care     Cymbalta, trazodone    Hypertension     Hypomagnesemia 11/21/2018    Hyponatremia 09/10/2023    Impaired functional mobility, balance, gait, and endurance 07/24/2015    Lactose intolerance     Major depressive disorder, recurrent episode, moderate 06/02/2023    Metastatic squamous cell carcinoma to lymph node 10/11/2018    Moderate episode of recurrent major depressive disorder 04/21/2021    Nephrostomy complication 10/26/2023    Neuropathy due to chemotherapeutic drug 11/14/2018    Osteoarthritis of back     Peritonitis 09/22/2020    Physical deconditioning 04/30/2021    Pseudomonas urinary tract infection 04/21/2021    Psychiatric problem     Pyelitis 09/09/2023    QT prolongation 04/16/2021    Refusal of blood transfusions as patient is Druze     Schatzki's ring 09/14/2015    Seen on outside EGD 05/2014, underwent esophageal dilatation. Bx were negative.     Seizure-like activity 12/02/2018    Seizures     Sleep stage dysfunction     Noted on PSG 06/2017; negative for obstructive sleep apnea     Stroke     Urinary tract infection associated with nephrostomy catheter 06/11/2020    Wound infection after surgery 09/24/2020       Past Surgical History:   Procedure Laterality Date    ANTEGRADE NEPHROSTOGRAPHY Bilateral 9/28/2020    Procedure: Nephrostogram - antegrade;  Surgeon: Leslie Balbuena MD;   Location: [facility] OR 1ST FLR;  Service: Urology;  Laterality: Bilateral;    ANTEGRADE NEPHROSTOGRAPHY Left 4/20/2021    Procedure: NEPHROSTOGRAM, ANTEGRADE;  Surgeon: Leslie Balbuena MD;  Location: [facility] OR 1ST FLR;  Service: Urology;  Laterality: Left;    ANTEGRADE NEPHROSTOGRAPHY Right 10/27/2022    Procedure: NEPHROSTOGRAM, ANTEGRADE;  Surgeon: Chirag Russ MD;  Location: [facility] OR 1ST FLR;  Service: Urology;  Laterality: Right;    ANTEGRADE NEPHROSTOGRAPHY Right 4/21/2023    Procedure: NEPHROSTOGRAM, ANTEGRADE;  Surgeon: Chirag Russ MD;  Location: [facility] OR 2ND FLR;  Service: Urology;  Laterality: Right;    ANTEGRADE NEPHROSTOGRAPHY Left 6/6/2023    Procedure: Nephrostogram - antegrade;  Surgeon: Leslie Balbuena MD;  Location: [facility] OR 1ST FLR;  Service: Urology;  Laterality: Left;    BILATERAL OOPHORECTOMY  2015    CHOLECYSTECTOMY  11/09/2016    Done at [facility], path showed chronic cholecystitis and gallstones    cold knife conization  2014    COLECTOMY, RIGHT  9/17/2020    Procedure: COLECTOMY, RIGHT Extended;  Surgeon: Hammad Reynolds MD;  Location: [facility] OR 2ND FLR;  Service: Colon and Rectal;;    COLONOSCOPY  2014    [facility]    COLONOSCOPY N/A 5/18/2018    Procedure: COLONOSCOPY;  Surgeon: Arden Gutiérrez MD;  Location: [facility] (4TH FLR);  Service: Endoscopy;  Laterality: N/A;    COLONOSCOPY N/A 7/28/2020    Procedure: COLONOSCOPY;  Surgeon: Hammad Reynolds MD;  Location: [facility] ENDO (4TH FLR);  Service: Colon and Rectal;  Laterality: N/A;  covid test [place] 7/25    CYSTOSCOPY WITH URETEROSCOPY, RETROGRADE PYELOGRAPHY, AND INSERTION OF STENT Bilateral 3/21/2020    Procedure: CYSTOSCOPY, WITH RETROGRADE PYELOGRAM,;  Surgeon: Leslie Balbuena MD;  Location: [facility] OR 1ST FLR;  Service: Urology;  Laterality: Bilateral;    DILATION OF NEPHROSTOMY TRACT Right 10/27/2022    Procedure: DILATION, NEPHROSTOMY TRACT;  Surgeon: Chirag Russ MD;  Location: [facility]  OR 1ST FLR;  Service: Urology;  Laterality: Right;    ESOPHAGOGASTRODUODENOSCOPY  2014    ESOPHAGOGASTRODUODENOSCOPY  11/18/2020    ESOPHAGOGASTRODUODENOSCOPY N/A 11/18/2020    Procedure: ESOPHAGOGASTRODUODENOSCOPY (EGD);  Surgeon: Zenon Spencer MD;  Location: [facility] ENDO;  Service: Endoscopy;  Laterality: N/A;    ESOPHAGOGASTRODUODENOSCOPY N/A 12/11/2020    Procedure: EGD (ESOPHAGOGASTRODUODENOSCOPY);  Surgeon: Juancho Muse MD;  Location: [facility] ENDO (2ND FLR);  Service: Endoscopy;  Laterality: N/A;    HYSTERECTOMY  2014    [facility] for cervical cancer    ILEOSTOMY  9/17/2020    Procedure: CREATION, ILEOSTOMY;  Surgeon: Hammad Reynolds MD;  Location: [facility] OR 2ND FLR;  Service: Colon and Rectal;;    LOOPOGRAM N/A 4/20/2021    Procedure: LOOPOGRAM;  Surgeon: Leslie Balbuena MD;  Location: [facility] OR 1ST FLR;  Service: Urology;  Laterality: N/A;    LOOPOGRAM N/A 6/6/2023    Procedure: LOOPOGRAM;  Surgeon: Leslie Balbuena MD;  Location: [facility] OR 1ST FLR;  Service: Urology;  Laterality: N/A;    MOBILIZATION OF SPLENIC FLEXURE N/A 9/11/2020    Procedure: MOBILIZATION, COLONIC;  Surgeon: Hammad Reynolds MD;  Location: [facility] OR 2ND FLR;  Service: Colon and Rectal;  Laterality: N/A;    NEPHROSTOGRAPHY Bilateral 4/17/2021    Procedure: Nephrostogram;  Surgeon: Celeste Surgeon;  Location: [facility];  Service: Anesthesiology;  Laterality: Bilateral;    OOPHORECTOMY      PERCUTANEOUS NEPHROLITHOTOMY Right 4/21/2023    Procedure: NEPHROLITHOTOMY, PERCUTANEOUS;  Surgeon: Chirag Russ MD;  Location: [facility] OR 2ND FLR;  Service: Urology;  Laterality: Right;  2.5 hrs    PERCUTANEOUS NEPHROSTOMY  4/21/2023    Procedure: CREATION, NEPHROSTOMY, PERCUTANEOUS with removal of existing nephrostomy tube;  Surgeon: Chirag Russ MD;  Location: [facility] OR 2ND FLR;  Service: Urology;;    PYELOSCOPY Right 10/27/2022    Procedure: PYELOSCOPY;  Surgeon: Chirag Russ MD;  Location: [facility] OR [address] [place] [address];  Service: Urology;  Laterality: Right;     REPLACEMENT OF NEPHROSTOMY TUBE Right 10/27/2022    Procedure: REPLACEMENT, NEPHROSTOMY TUBE;  Surgeon: Chirag Russ MD;  [facility]  Service: Urology;  Laterality: Right;    RETROPERITONEAL LYMPHADENECTOMY Right 9/17/2018    Procedure: LYMPHADENECTOMY, RETROPERITONEUM;  Surgeon: Sebas Reed MD;  Location: [facility] OR 2ND FLR;  Service: General;  Laterality: Right;  ILIAC    URETEROSCOPIC REMOVAL OF URETERIC CALCULUS Right 10/27/2022    Procedure: REMOVAL, CALCULUS, URETER, URETEROSCOPIC;  Surgeon: Chirag Russ MD;  Location: [facility] OR 1ST FLR;  Service: Urology;  Laterality: Right;    URETEROSCOPY Right 10/27/2022    Procedure: URETEROSCOPY-ANTEGRADE;  Surgeon: Chirag Russ MD;  Location: [facility] OR [address] [place] [address];  Service: Urology;  Laterality: Right;       Review of Systems   Constitutional:  Negative for chills, fatigue and fever.   HENT:  Negative for trouble swallowing and voice change.    Eyes:  Negative for photophobia and visual disturbance.        No acute visual changes   Respiratory:  Negative for apnea, cough, chest tightness, shortness of breath and wheezing.         Was tested for JENN- positive     Cardiovascular:  Negative for chest pain, palpitations and leg swelling.   Gastrointestinal:  Positive for nausea and vomiting. Negative for abdominal distention, abdominal pain, blood in stool, constipation and diarrhea.        NO FLD, hepatitis, cirrhosis  No BRB or black tarry stool      Colostomy and iliostomy     Genitourinary:  Negative for difficulty urinating, dysuria, flank pain, frequency, hematuria and urgency.   Musculoskeletal:  Positive for back pain. Negative for arthralgias, gait problem, joint swelling, myalgias, neck pain and neck stiffness.   Neurological:  Positive for dizziness and headaches. Negative for tremors, seizures, syncope, weakness, light-headedness and numbness.   Psychiatric/Behavioral:  Negative for suicidal ideas.         VITALS  Visit  "Vitals  /74 (BP Location: Left arm, Patient Position: Sitting)   Pulse 95   Temp 98.3 °F (36.8 °C) (Oral)   Ht 5' 9" (1.753 m)   Wt 83 kg (182 lb 15.7 oz)   LMP 06/08/2014 (Approximate)   SpO2 98%   BMI 27.02 kg/m²          Physical Exam  Constitutional:       General: She is not in acute distress.     Appearance: Normal appearance. She is well-developed. She is not diaphoretic.   HENT:      Head: Normocephalic.      Right Ear: Hearing normal.      Left Ear: Hearing normal.      Nose: Nose normal.      Mouth/Throat:      Lips: Pink.      Mouth: Mucous membranes are moist.      Pharynx: No oropharyngeal exudate.   Eyes:      General: Lids are normal.         Right eye: No discharge.         Left eye: No discharge.      Conjunctiva/sclera: Conjunctivae normal.      Pupils: Pupils are equal, round, and reactive to light.   Neck:      Thyroid: No thyromegaly.      Vascular: No carotid bruit or JVD.      Trachea: Trachea and phonation normal. No tracheal deviation.   Cardiovascular:      Rate and Rhythm: Normal rate and regular rhythm.      Pulses: Normal pulses.           Carotid pulses are 2+ on the right side and 2+ on the left side.       Radial pulses are 2+ on the right side and 2+ on the left side.        Posterior tibial pulses are 2+ on the right side and 2+ on the left side.      Heart sounds: Normal heart sounds. No murmur heard.     No friction rub. No gallop.   Pulmonary:      Effort: Pulmonary effort is normal. No respiratory distress.      Breath sounds: Normal breath sounds. No stridor. No wheezing or rales.      Comments: Clear Anterior and Posterior BBS  Abdominal:      General: Abdomen is protuberant. Bowel sounds are normal. There is no distension.      Palpations: Abdomen is soft.      Tenderness: There is no abdominal tenderness. There is no guarding.       Musculoskeletal:         General: No tenderness or deformity. Normal range of motion.      Cervical back: Normal range of motion and neck " supple. No rigidity.      Right lower leg: No edema.      Left lower leg: No edema.   Lymphadenopathy:      Head:      Right side of head: No submental, submandibular, tonsillar, preauricular, posterior auricular or occipital adenopathy.      Left side of head: No submental, submandibular, tonsillar, preauricular, posterior auricular or occipital adenopathy.      Cervical: No cervical adenopathy.      Right cervical: No superficial cervical adenopathy.     Left cervical: No superficial cervical adenopathy.   Skin:     General: Skin is warm and dry.      Capillary Refill: Capillary refill takes 2 to 3 seconds.      Coloration: Skin is not pale.      Findings: No erythema or rash.          Neurological:      Mental Status: She is alert and oriented to person, place, and time. Mental status is at baseline.      GCS: GCS eye subscore is 4. GCS verbal subscore is 5. GCS motor subscore is 6.      Motor: No abnormal muscle tone.      Coordination: Coordination normal.   Psychiatric:         Attention and Perception: Attention and perception normal.         Mood and Affect: Mood and affect normal.         Speech: Speech normal.         Behavior: Behavior normal. Behavior is cooperative.         Thought Content: Thought content normal.         Cognition and Memory: Cognition and memory normal.         Judgment: Judgment normal.          Significant Labs:  Lab Results   Component Value Date    WBC 6.56 11/06/2023    HGB 11.0 (L) 11/06/2023    HCT 36.9 (L) 11/06/2023     11/06/2023    CHOL 186 10/20/2022    TRIG 86 10/20/2022    HDL 77 (H) 10/20/2022    ALT 17 11/06/2023    AST 17 11/06/2023     11/06/2023    K 3.6 11/06/2023     11/06/2023    CREATININE 1.3 11/06/2023    BUN 14 11/06/2023    CO2 22 (L) 11/06/2023    TSH 0.527 12/02/2018    INR 1.0 09/11/2023    GLUF 94 09/20/2016    HGBA1C 4.9 02/04/2021       Diagnostic Studies: No relevant studies.    EKG:   Results for orders placed or performed during  the hospital encounter of 11/03/23   EKG 12-lead    Collection Time: 11/03/23  8:57 PM    Narrative    Test Reason : R11.10,    Vent. Rate : 074 BPM     Atrial Rate : 074 BPM     P-R Int : 154 ms          QRS Dur : 078 ms      QT Int : 388 ms       P-R-T Axes : 079 081 084 degrees     QTc Int : 430 ms    Normal sinus rhythm  Normal ECG  When compared with ECG of 09-SEP-2023 13:38,  The axis Shifted left  T wave amplitude has decreased in Anterior leads  Confirmed by ISHAN JIMENEZ MD ([id number]) on 11/4/2023 10:47:35 AM    Referred By: [email or web address]   SELF           Confirmed By:ISHAN JIMENEZ MD       2D ECHO:  TTE:  Results for orders placed or performed during the hospital encounter of 10/20/22   Echo   Result Value Ref Range    TDI SEPTAL 0.12 m/s    LV LATERAL E/E' RATIO 5.00 m/s    LV SEPTAL E/E' RATIO 5.83 m/s    IVC diameter 1.26 cm    Left Ventricular Outflow Tract Mean Velocity 0.65 cm/s    Left Ventricular Outflow Tract Mean Gradient 1.97 mmHg    AORTIC VALVE CUSP SEPERATION 1.70 cm    TDI LATERAL 0.14 m/s    PV PEAK VELOCITY 1.12 cm/s    LVIDd 3.58 3.5 - 6.0 cm    IVS 1.06 0.6 - 1.1 cm    Posterior Wall 1.06 0.6 - 1.1 cm    Ao root annulus 2.34 cm    LVIDs 2.03 (A) 2.1 - 4.0 cm    FS 43 28 - 44 %    Sinus 1.74 cm    LV mass 116.51 g    RVDD 2.29 cm    Left Ventricle Relative Wall Thickness 0.59 cm    AV mean gradient 3 mmHg    AV valve area 1.30 cm2    AV Velocity Ratio 0.78     AV index (prosthetic) 0.81     MV valve area p 1/2 method 4.92 cm2    E/A ratio 1.01     Mean e' 0.13 m/s    E wave deceleration time 154.78 msec    IVRT 68.51 msec    LVOT diameter 1.43 cm    LVOT area 1.6 cm2    LVOT peak abdelrahman 0.97 m/s    LVOT peak VTI 21.70 cm    Ao peak abdelrahman 1.25 m/s    Ao VTI 26.7 cm    LVOT stroke volume 34.83 cm3    AV peak gradient 6 mmHg    E/E' ratio 5.38 m/s    MV Peak E Abdelrahman 0.70 m/s    TR Max Abdelrahman 1.77 m/s    MV stenosis pressure 1/2 time 44.69 ms    MV Peak A Abdelrahman 0.69 m/s    LV Systolic Volume 13.26 mL    LV  Diastolic Volume 53.67 mL    RA Major Axis 3.68 cm    Left Atrium Minor Axis 2.34 cm    Left Atrium Major Axis 4.47 cm    Triscuspid Valve Regurgitation Peak Gradient 13 mmHg    LA volume (mod) 31.47 cm3    RA Width 2.79 cm    Right Atrial Pressure (from IVC) 3 mmHg    EF 70 %    TV resting pulmonary artery pressure 16 mmHg    Narrative    · The left ventricle is normal in size with mild concentric hypertrophy   and normal systolic function.  · The estimated ejection fraction is 70%.  · Normal left ventricular diastolic function.  · Normal right ventricular size with normal right ventricular systolic   function.  · Mild mitral regurgitation.  · Normal central venous pressure (3 mmHg).  · The estimated PA systolic pressure is 16 mmHg.          VALERIA:  No results found. However, due to the size of the patient record, not all encounters were searched. Please check Results Review for a complete set of results.     Imaging     Active Cardiac Conditions: None      Revised Cardiac Risk Index   High -Risk Surgery  Intraperitoneal; Intrathoracic; suprainguinal vascular Yes- + 1 No- 0   History of Ischemic Heart Disease   (Hx of MI/positive exercise test/current chest pain due to ischemia/use of nitrate therapy/EKG with pathological Q waves) Yes- + 1 No- 0   History of CHF  (Pulmonary edema/bilateral rales or S3 gallop/PND/CXR showing pulmonary vascular redistribution) Yes- + 1 No- 0   History of CVA   (Prior stroke or TIA) Yes- + 1 No- 0   Pre-operative treatment with insulin Yes- + 1 No- 0   Pre-operative creatinine > 2mg/dl Yes- + 1 No- 0   Total:      Risk Status:  Estimated risk of cardiac complications after non-cardiac surgery using the Revised Cardiac Risk Index for Preoperative risk is 6.0 %      ARISCAT (Canet) risk index: Low: 1.6% risk of post-op pulmonary complications.    American Society of Anesthesiologists Physical Status classification (ASA): 3         No further cardiac workup needed prior to  surgery.        Preoperative cardiac risk assessment-  The patient does not have any active cardiac conditions . Revised cardiac risk index predictors- ---.Functional capacity is more than 4 Mets. She will be undergoing a Urological procedure that carries a Moderate Risk risk     The estimated risk of the rate of adverse cardiac outcomes  6%    No further cardiac work up is indicated prior to proceeding with the surgery          American Society of Anesthesiologists Physical status classification ( ASA ) class: 3     Postoperative pulmonary complication risk assessment: 1.6     ARISCAT ( Canet) risk index- risk class -  Low, if duration of surgery is under 3 hours, intermediate, if duration of surgery is over 3 hours          Assessment/Plan:     CKD (chronic kidney disease), stage III  GFR 47  Stages of CKD discussed  Deleterious effects NSAID's , Beneficial effects of Hydration discussed   Tylenol as needed for pain   I suggest monitoring renal function, in put and out put status socorro-operatively. I  suggest avoiding nephrotoxic medication including NSAIDs, COX2 inhibitors, intravenous contrast agent,avoiding hypotension to prevent further renal impairment.     History of suicidal ideation  Lost twins as adults    Essential hypertension  /74 today  No meds for BP at this time    History of DVT (deep vein thrombosis)  DVT in 2020, likely provoked due to prolonged/recurrent hospitalizations. Previously treated with Eliquis, no longer taking.      - DVT ppx w heparin  - held for IR procedure 9/11/23, restarted that evening    DVT prophylaxis: compression stockings, sequential  compression devices and anti coagulation as per Surgical team    Cervical cancer  Had hysterectomy    Metastatic squamous cell carcinoma to lymph node  Removed lymph node, chemo,  radiation    Moderate malnutrition  BMI 27.02    Severe episode of recurrent major depressive disorder, with psychotic features  Currently on Remeron  Denies  SI    QT prolongation  Vent. Rate : 074 BPM     Atrial Rate : 074 BPM     P-R Int : 154 ms          QRS Dur : 078 ms      QT Int : 388 ms       P-R-T Axes : 079 081 084 degrees     QTc Int : 430 ms    Normal sinus rhythm  Normal ECG  When compared with ECG of 09-SEP-2023 13:38,  The axis Shifted left  T wave amplitude has decreased in Anterior leads  Confirmed by TONY RAMSAY, HOMEYAR ([id number]) on 11/4/2023 10:47:35 AM        Specimen Collected: 11/03/23 20:57          Emphysema of lung  Mild diffuse emphysematous changes noted on Chest CT 2020. Has never smoked tobacco.     Nephrostomy status  Left back Nphro tube intact draining clear yellow urine    Presence of urostomy  Intake left abdomen    Hyponatremia      Pyelitis  Resolved    Anemia in chronic kidney disease  Hgb  11  HCT 36.9    Ileostomy in place  Present right abd- draining liquid brown stool      Preventive perioperative care    Thromboembolic prophylaxis:  Her risk factors for thrombosis include surgical procedure, age, and reduced mobility.I suggest  thromboembolic prophylaxis ( mechanical/pharmacological, weighing the risk benefits of pharmacological agent use considering socorro procedural bleeding )  during the perioperative period.I suggested being active in the post operative period.      Postoperative pulmonary complication prophylaxis-Risk factors for post operative pulmonary complications include ASA class >2- I suggest incentive spirometry use, early ambulation, and pain control so as to avoid diaphragmatic splinting  Brush teeth twice per day, oral rinses, sleep with the head of the bed up 30 degrees     Renal complication prophylaxis-Risk factors for renal complications include pre-existing renal disease . I suggest keeping her well hydrated and avoidance/ minimizing the use of  NSAID's,JACOBSON 2 Inhibitors ,IV contrast if possible in the perioperative period.I suggested drinking 2 litre's of water a day      Surgical site Infection Prophylaxis-I   suggest appropriate antibiotic for Prophylaxis against Surgical site infections Shower with Hibiclens in the night before surgery and the morning of surgery        In view of urological procedure the patient  is at risk of postoperative urinary retention.  I suggest avoidance / minimizing the of  Benzodiazepines,Anticholinergic medication,antihistamines ( Benadryl) , if possible in the perioperative period. I suggest using the minimum possible use of opioids for the minimum period of time in the perioperative period. Benadryl avoidance suggested      This visit was focused on Preoperative evaluation, Perioperative Medical management, complication reduction plans. I suggest that the patient follows up with primary care or relevant sub specialists for ongoing health care.    I appreciate the opportunity to be involved in this patients care. Please feel free to contact me if there were any questions about this consultation.    Patient is optimized    70 minutes This includes face to face time and non-face to face time preparing to see the patient (eg, review of tests), obtaining and/or reviewing separately obtained history, documenting clinical information in the electronic or other health record, independently interpreting results and communicating results to the patient/family/caregiver, or care coordinator.     Cordell George NP  Perioperative Medicine  [facility]   Pager [phone]

## 2023-11-27 NOTE — DISCHARGE INSTRUCTIONS
Your surgery has been scheduled for:______11/28/23____________________________________    You should report to:  ____[facility], located on the [place] side of the first floor of the           [facility] ([phone])  _X___[facility], located on the [facility] side of the            Second floor of the [facility] ([phone])  ____3rd Floor SSCU located on the [facility] side of the [facility] [phone]  ____[facility]: located at [address] [facility] [place] [address]. [address].     Please Note   Tell your doctor if you take Aspirin, products containing Aspirin, herbal medications  or blood thinners, such as Coumadin, Ticlid, or Plavix.  (Consult your provider regarding holding or stopping before surgery).  Arrange for someone to drive you home following surgery.  You will not be allowed to leave the surgical facility alone or drive yourself home following sedation and anesthesia.    Before Surgery  Stop taking all herbal medications, vitamins, and supplements 7 days prior to surgery  No Motrin/Advil (Ibuprofen) 7 days before surgery  No Aleve (Naproxen) 7 days before surgery  Stop Taking Asprin, products containing Aspirin __7___days before surgery   No Goody's/BC Powder 7 days before surgery  Refrain from drinking alcoholic beverages for 24 hours before and after surgery  Stop or limit smoking at least 24 hours prior to surgery  You may take Tylenol for pain    Night before Surgery  Do not eat or drink after midnight  Take a shower or bath (shower is recommended).  Bathe with Hibiclens soap or an antibacterial soap from the neck down.  If not supplied by your surgeon, hibiclens soap will need to be purchased over the counter in pharmacy.  Rinse soap off thoroughly.  Shampoo your hair with your regular shampoo    The Day of Surgery  Take another bath or shower with hibiclens  or any antibacterial soap, to reduce the chance of infection.  Take heart and blood pressure medications with a small sip of water, as advised by the perioperative team.  Do not take fluid pills  You may brush your teeth and rinse your mouth, but do not swallow any additional water.   Do not apply perfumes, powder, body lotions or deodorant on the day of surgery.  Nail polish should be removed.  Do not wear makeup or moisturizer  Wear comfortable clothes, such as a button front shirt and loose fitting pants.  Leave all jewelry, including body piercings, and valuables at home.    Bring any devices you will neeed after surgery such as crutches or canes.  If you have sleep apnea, please bring your CPAP machine  In the event that your physical condition changes including the onset of a cold or respiratory illness, or if you have to delay or cancel your surgery, please notify your surgeon.

## 2023-11-27 NOTE — HPI
This is a 58 y.o. female  who presents today for a preoperative evaluation in preparation for revisionureteralileal   Patient is new to me.    The history has been obtained by speaking with the patient and reviewing the electronic medical record and/or outside health information. Significant health conditions for the perioperative period are discussed below in assessment and plan.     Patient reports current health status to be Fair.  Denies any new symptoms before surgery.

## 2023-11-27 NOTE — OUTPATIENT SUBJECTIVE & OBJECTIVE
Outpatient Subjective & Objective      Chief Complaint: Preoperative evaulation, perioperative medical management, and complication reduction plan.     Functional Capacity:  Able to climb a flight of stairs without CP SOB or Syncope.  Able to meet 4 METs      Anesthesia issues: None    Difficulty mouth opening: none    Steroid use in the last 12 months: none     Dental Issues:none    Family anesthesia difficulty: None       Family Hx of Thrombosis none    Past Medical History:   Diagnosis Date    Abnormal mammogram 08/25/2020    Abnormal mammogram 08/25/2020    Acute blood loss anemia     Acute deep vein thrombosis (DVT) of lower extremity 12/09/2020    Advance care planning 04/30/2021    ODESSA (acute kidney injury) 03/21/2020    Anemia due to chronic blood loss     Anemia due to chronic kidney disease     Anemia due to chronic kidney disease 05/18/2020    Anxiety     Bilateral ureteral obstruction 09/11/2020    Bilious vomiting with nausea 06/11/2023    Cardiovascular event risk -- low 09/14/2015    ASCVD 10-year risk 1.9% (with optimal risk factors 1.3%) as of 9/14/2015     Cervical cancer 2014    Chronic back pain     Colostomy care     Deep vein thrombosis     Depression     Diarrhea due to malabsorption 11/14/2018    Difficult intubation     Discharge planning issues 04/30/2021    Disorder of kidney and ureter     DVT of lower extremity, bilateral 11/04/2020    Edema 04/10/2023    Emphysema of lung 04/10/2023    Fibromyalgia     Fungemia 09/27/2020    Generalized abdominal pain 08/25/2020    GERD (gastroesophageal reflux disease)     Hemifacial spasm 09/16/2015    Hiatal hernia 2014    History of cervical cancer 10/11/2018    History of essential hypertension 05/04/2015    Not requiring medications at this time  BP is low- concern that this may correlate with increased stool output from stoma. Encourage her to contact GI and her PCP.    Hx of psychiatric care     Cymbalta, trazodone    Hypertension      Hypomagnesemia 11/21/2018    Hyponatremia 09/10/2023    Impaired functional mobility, balance, gait, and endurance 07/24/2015    Lactose intolerance     Major depressive disorder, recurrent episode, moderate 06/02/2023    Metastatic squamous cell carcinoma to lymph node 10/11/2018    Moderate episode of recurrent major depressive disorder 04/21/2021    Nephrostomy complication 10/26/2023    Neuropathy due to chemotherapeutic drug 11/14/2018    Osteoarthritis of back     Peritonitis 09/22/2020    Physical deconditioning 04/30/2021    Pseudomonas urinary tract infection 04/21/2021    Psychiatric problem     Pyelitis 09/09/2023    QT prolongation 04/16/2021    Refusal of blood transfusions as patient is Gnosticist     Schatzki's ring 09/14/2015    Seen on outside EGD 05/2014, underwent esophageal dilatation. Bx were negative.     Seizure-like activity 12/02/2018    Seizures     Sleep stage dysfunction     Noted on PSG 06/2017; negative for obstructive sleep apnea     Stroke     Urinary tract infection associated with nephrostomy catheter 06/11/2020    Wound infection after surgery 09/24/2020       Past Surgical History:   Procedure Laterality Date    ANTEGRADE NEPHROSTOGRAPHY Bilateral 9/28/2020    Procedure: Nephrostogram - antegrade;  Surgeon: Leslie Balbuena MD;  Location: [facility] OR [address] [place] [address];  Service: Urology;  Laterality: Bilateral;    ANTEGRADE NEPHROSTOGRAPHY Left 4/20/2021    Procedure: NEPHROSTOGRAM, ANTEGRADE;  Surgeon: Leslie Balbuena MD;  Location: [address];  Service: Urology;  Laterality: Left;    ANTEGRADE NEPHROSTOGRAPHY Right 10/27/2022    Procedure: NEPHROSTOGRAM, ANTEGRADE;  Surgeon: Chirag Russ MD;  Location: [address];  Service: Urology;  Laterality: Right;    ANTEGRADE NEPHROSTOGRAPHY Right 4/21/2023    Procedure: NEPHROSTOGRAM, ANTEGRADE;  Surgeon: Chirag Russ MD;  Location: [facility] OR [address] [place] [address];  Service: Urology;  Laterality: Right;    ANTEGRADE NEPHROSTOGRAPHY Left  6/6/2023    Procedure: Nephrostogram - antegrade;  Surgeon: Leslie Balbuena MD;  Location: [facility] OR 1ST FLR;  Service: Urology;  Laterality: Left;    BILATERAL OOPHORECTOMY  2015    CHOLECYSTECTOMY  11/09/2016    Done at [facility], path showed chronic cholecystitis and gallstones    cold knife conization  2014    COLECTOMY, RIGHT  9/17/2020    Procedure: COLECTOMY, RIGHT Extended;  Surgeon: Hammad Reynolds MD;  Location: [facility] OR 2ND FLR;  Service: Colon and Rectal;;    COLONOSCOPY  2014    COLONOSCOPY N/A 10/24/2016    at [facility], Dr Gutiérrez    COLONOSCOPY N/A 5/18/2018    Procedure: COLONOSCOPY;  Surgeon: Arden Gutiérrez MD;  Location: [facility] (4TH FLR);  Service: Endoscopy;  Laterality: N/A;    COLONOSCOPY N/A 7/28/2020    Procedure: COLONOSCOPY;  Surgeon: Hammad Reynolds MD;  Location: [facility] ENDO (4TH FLR);  Service: Colon and Rectal;  Laterality: N/A;  covid test [place] 7/25    CYSTOSCOPY WITH URETEROSCOPY, RETROGRADE PYELOGRAPHY, AND INSERTION OF STENT Bilateral 3/21/2020    Procedure: CYSTOSCOPY, WITH RETROGRADE PYELOGRAM,;  Surgeon: Leslie Balbuena MD;  Location: [facility] OR 1ST FLR;  Service: Urology;  Laterality: Bilateral;    DILATION OF NEPHROSTOMY TRACT Right 10/27/2022    Procedure: DILATION, NEPHROSTOMY TRACT;  Surgeon: Chirag Russ MD;  Location: [facility] OR 1ST FLR;  Service: Urology;  Laterality: Right;    ESOPHAGOGASTRODUODENOSCOPY  2014    ESOPHAGOGASTRODUODENOSCOPY  11/18/2020    ESOPHAGOGASTRODUODENOSCOPY N/A 11/18/2020    Procedure: ESOPHAGOGASTRODUODENOSCOPY (EGD);  Surgeon: Zenon Spencer MD;  Location: [facility];  Service: Endoscopy;  Laterality: N/A;    ESOPHAGOGASTRODUODENOSCOPY N/A 12/11/2020    Procedure: EGD (ESOPHAGOGASTRODUODENOSCOPY);  Surgeon: Juancho Muse MD;  Location: [facility] ENDO (2ND FLR);  Service: Endoscopy;  Laterality: N/A;    HYSTERECTOMY  2014    [facility] for cervical cancer    ILEOSTOMY  9/17/2020    Procedure: CREATION, ILEOSTOMY;  Surgeon: Hammad Reynolds MD;   Location: [facility] OR 2ND FLR;  Service: Colon and Rectal;;    LOOPOGRAM N/A 4/20/2021    Procedure: LOOPOGRAM;  Surgeon: Leslie Balbuena MD;  Location: [facility] OR 1ST FLR;  Service: Urology;  Laterality: N/A;    LOOPOGRAM N/A 6/6/2023    Procedure: LOOPOGRAM;  Surgeon: Leslie Balbuena MD;  Location: [facility] OR 1ST FLR;  Service: Urology;  Laterality: N/A;    MOBILIZATION OF SPLENIC FLEXURE N/A 9/11/2020    Procedure: MOBILIZATION, COLONIC;  Surgeon: Hammad Reynolds MD;  Location: [facility] OR 2ND FLR;  Service: Colon and Rectal;  Laterality: N/A;    NEPHROSTOGRAPHY Bilateral 4/17/2021    Procedure: Nephrostogram;  Surgeon: Celeste Surgeon;  Location: [facility];  Service: Anesthesiology;  Laterality: Bilateral;    OOPHORECTOMY      PERCUTANEOUS NEPHROLITHOTOMY Right 4/21/2023    Procedure: NEPHROLITHOTOMY, PERCUTANEOUS;  Surgeon: Chirag Russ MD;  Location: [facility] OR 2ND FLR;  Service: Urology;  Laterality: Right;  2.5 hrs    PERCUTANEOUS NEPHROSTOMY  4/21/2023    Procedure: CREATION, NEPHROSTOMY, PERCUTANEOUS with removal of existing nephrostomy tube;  Surgeon: Chirag Russ MD;  [facility];  Service: Urology;;    PYELOSCOPY Right 10/27/2022    Procedure: PYELOSCOPY;  Surgeon: Chirag Russ MD;  [facility];  Service: Urology;  Laterality: Right;    REPLACEMENT OF NEPHROSTOMY TUBE Right 10/27/2022    Procedure: REPLACEMENT, NEPHROSTOMY TUBE;  Surgeon: Chirag Russ MD;  [facility];  Service: Urology;  Laterality: Right;    RETROPERITONEAL LYMPHADENECTOMY Right 9/17/2018    Procedure: LYMPHADENECTOMY, RETROPERITONEUM;  Surgeon: Sebas Reed MD;  [facility];  Service: General;  Laterality: Right;  ILIAC    URETEROSCOPIC REMOVAL OF URETERIC CALCULUS Right 10/27/2022    Procedure: REMOVAL, CALCULUS, URETER, URETEROSCOPIC;  Surgeon: Chirag Russ MD;  Location: [facility] OR 1ST FLR;  Service: Urology;  Laterality: Right;    URETEROSCOPY Right  "10/27/2022    Procedure: URETEROSCOPY-ANTEGRADE;  Surgeon: Chirag Russ MD;  Location: [facility] OR [address] [place] [address];  Service: Urology;  Laterality: Right;       Review of Systems   Constitutional:  Negative for chills, fatigue and fever.   HENT:  Negative for trouble swallowing and voice change.    Eyes:  Negative for photophobia and visual disturbance.        No acute visual changes   Respiratory:  Negative for apnea, cough, chest tightness, shortness of breath and wheezing.         Was tested for JENN- positive     Cardiovascular:  Negative for chest pain, palpitations and leg swelling.   Gastrointestinal:  Positive for nausea and vomiting. Negative for abdominal distention, abdominal pain, blood in stool, constipation and diarrhea.        NO FLD, hepatitis, cirrhosis  No BRB or black tarry stool      Colostomy and iliostomy     Genitourinary:  Negative for difficulty urinating, dysuria, flank pain, frequency, hematuria and urgency.   Musculoskeletal:  Positive for back pain. Negative for arthralgias, gait problem, joint swelling, myalgias, neck pain and neck stiffness.   Neurological:  Positive for dizziness and headaches. Negative for tremors, seizures, syncope, weakness, light-headedness and numbness.   Psychiatric/Behavioral:  Negative for suicidal ideas.         VITALS  Visit Vitals  /74 (BP Location: Left arm, Patient Position: Sitting)   Pulse 95   Temp 98.3 °F (36.8 °C) (Oral)   Ht 5' 9" (1.753 m)   Wt 83 kg (182 lb 15.7 oz)   LMP 06/08/2014 (Approximate)   SpO2 98%   BMI 27.02 kg/m²          Physical Exam  Constitutional:       General: She is not in acute distress.     Appearance: Normal appearance. She is well-developed. She is not diaphoretic.   HENT:      Head: Normocephalic.      Right Ear: Hearing normal.      Left Ear: Hearing normal.      Nose: Nose normal.      Mouth/Throat:      Lips: Pink.      Mouth: Mucous membranes are moist.      Pharynx: No oropharyngeal exudate.   Eyes:      General: Lids " are normal.         Right eye: No discharge.         Left eye: No discharge.      Conjunctiva/sclera: Conjunctivae normal.      Pupils: Pupils are equal, round, and reactive to light.   Neck:      Thyroid: No thyromegaly.      Vascular: No carotid bruit or JVD.      Trachea: Trachea and phonation normal. No tracheal deviation.   Cardiovascular:      Rate and Rhythm: Normal rate and regular rhythm.      Pulses: Normal pulses.           Carotid pulses are 2+ on the right side and 2+ on the left side.       Radial pulses are 2+ on the right side and 2+ on the left side.        Posterior tibial pulses are 2+ on the right side and 2+ on the left side.      Heart sounds: Normal heart sounds. No murmur heard.     No friction rub. No gallop.   Pulmonary:      Effort: Pulmonary effort is normal. No respiratory distress.      Breath sounds: Normal breath sounds. No stridor. No wheezing or rales.      Comments: Clear Anterior and Posterior BBS  Abdominal:      General: Abdomen is protuberant. Bowel sounds are normal. There is no distension.      Palpations: Abdomen is soft.      Tenderness: There is no abdominal tenderness. There is no guarding.       Musculoskeletal:         General: No tenderness or deformity. Normal range of motion.      Cervical back: Normal range of motion and neck supple. No rigidity.      Right lower leg: No edema.      Left lower leg: No edema.   Lymphadenopathy:      Head:      Right side of head: No submental, submandibular, tonsillar, preauricular, posterior auricular or occipital adenopathy.      Left side of head: No submental, submandibular, tonsillar, preauricular, posterior auricular or occipital adenopathy.      Cervical: No cervical adenopathy.      Right cervical: No superficial cervical adenopathy.     Left cervical: No superficial cervical adenopathy.   Skin:     General: Skin is warm and dry.      Capillary Refill: Capillary refill takes 2 to 3 seconds.      Coloration: Skin is not pale.       Findings: No erythema or rash.          Neurological:      Mental Status: She is alert and oriented to person, place, and time. Mental status is at baseline.      GCS: GCS eye subscore is 4. GCS verbal subscore is 5. GCS motor subscore is 6.      Motor: No abnormal muscle tone.      Coordination: Coordination normal.   Psychiatric:         Attention and Perception: Attention and perception normal.         Mood and Affect: Mood and affect normal.         Speech: Speech normal.         Behavior: Behavior normal. Behavior is cooperative.         Thought Content: Thought content normal.         Cognition and Memory: Cognition and memory normal.         Judgment: Judgment normal.          Significant Labs:  Lab Results   Component Value Date    WBC 6.56 11/06/2023    HGB 11.0 (L) 11/06/2023    HCT 36.9 (L) 11/06/2023     11/06/2023    CHOL 186 10/20/2022    TRIG 86 10/20/2022    HDL 77 (H) 10/20/2022    ALT 17 11/06/2023    AST 17 11/06/2023     11/06/2023    K 3.6 11/06/2023     11/06/2023    CREATININE 1.3 11/06/2023    BUN 14 11/06/2023    CO2 22 (L) 11/06/2023    TSH 0.527 12/02/2018    INR 1.0 09/11/2023    GLUF 94 09/20/2016    HGBA1C 4.9 02/04/2021       Diagnostic Studies: No relevant studies.    EKG:   Results for orders placed or performed during the hospital encounter of 11/03/23   EKG 12-lead    Collection Time: 11/03/23  8:57 PM    Narrative    Test Reason : R11.10,    Vent. Rate : 074 BPM     Atrial Rate : 074 BPM     P-R Int : 154 ms          QRS Dur : 078 ms      QT Int : 388 ms       P-R-T Axes : 079 081 084 degrees     QTc Int : 430 ms    Normal sinus rhythm  Normal ECG  When compared with ECG of 09-SEP-2023 13:38,  The axis Shifted left  T wave amplitude has decreased in Anterior leads  Confirmed by TONY RAMSAY, HOMEYAR ([id number]) on 11/4/2023 10:47:35 AM    Referred By: [email or web address]   SELF           Confirmed By:ISHAN JIMENEZ MD       2D ECHO:  TTE:  Results for orders placed or  performed during the hospital encounter of 10/20/22   Echo   Result Value Ref Range    TDI SEPTAL 0.12 m/s    LV LATERAL E/E' RATIO 5.00 m/s    LV SEPTAL E/E' RATIO 5.83 m/s    IVC diameter 1.26 cm    Left Ventricular Outflow Tract Mean Velocity 0.65 cm/s    Left Ventricular Outflow Tract Mean Gradient 1.97 mmHg    AORTIC VALVE CUSP SEPERATION 1.70 cm    TDI LATERAL 0.14 m/s    PV PEAK VELOCITY 1.12 cm/s    LVIDd 3.58 3.5 - 6.0 cm    IVS 1.06 0.6 - 1.1 cm    Posterior Wall 1.06 0.6 - 1.1 cm    Ao root annulus 2.34 cm    LVIDs 2.03 (A) 2.1 - 4.0 cm    FS 43 28 - 44 %    Sinus 1.74 cm    LV mass 116.51 g    RVDD 2.29 cm    Left Ventricle Relative Wall Thickness 0.59 cm    AV mean gradient 3 mmHg    AV valve area 1.30 cm2    AV Velocity Ratio 0.78     AV index (prosthetic) 0.81     MV valve area p 1/2 method 4.92 cm2    E/A ratio 1.01     Mean e' 0.13 m/s    E wave deceleration time 154.78 msec    IVRT 68.51 msec    LVOT diameter 1.43 cm    LVOT area 1.6 cm2    LVOT peak abdelrahman 0.97 m/s    LVOT peak VTI 21.70 cm    Ao peak abdelrahman 1.25 m/s    Ao VTI 26.7 cm    LVOT stroke volume 34.83 cm3    AV peak gradient 6 mmHg    E/E' ratio 5.38 m/s    MV Peak E Abdelrahman 0.70 m/s    TR Max Abdelrahman 1.77 m/s    MV stenosis pressure 1/2 time 44.69 ms    MV Peak A Abdelrahman 0.69 m/s    LV Systolic Volume 13.26 mL    LV Diastolic Volume 53.67 mL    RA Major Axis 3.68 cm    Left Atrium Minor Axis 2.34 cm    Left Atrium Major Axis 4.47 cm    Triscuspid Valve Regurgitation Peak Gradient 13 mmHg    LA volume (mod) 31.47 cm3    RA Width 2.79 cm    Right Atrial Pressure (from IVC) 3 mmHg    EF 70 %    TV resting pulmonary artery pressure 16 mmHg    Narrative    · The left ventricle is normal in size with mild concentric hypertrophy   and normal systolic function.  · The estimated ejection fraction is 70%.  · Normal left ventricular diastolic function.  · Normal right ventricular size with normal right ventricular systolic   function.  · Mild mitral  regurgitation.  · Normal central venous pressure (3 mmHg).  · The estimated PA systolic pressure is 16 mmHg.          VALERIA:  No results found. However, due to the size of the patient record, not all encounters were searched. Please check Results Review for a complete set of results.     Imaging     Active Cardiac Conditions: None      Revised Cardiac Risk Index   High -Risk Surgery  Intraperitoneal; Intrathoracic; suprainguinal vascular Yes- + 1 No- 0   History of Ischemic Heart Disease   (Hx of MI/positive exercise test/current chest pain due to ischemia/use of nitrate therapy/EKG with pathological Q waves) Yes- + 1 No- 0   History of CHF  (Pulmonary edema/bilateral rales or S3 gallop/PND/CXR showing pulmonary vascular redistribution) Yes- + 1 No- 0   History of CVA   (Prior stroke or TIA) Yes- + 1 No- 0   Pre-operative treatment with insulin Yes- + 1 No- 0   Pre-operative creatinine > 2mg/dl Yes- + 1 No- 0   Total:      Risk Status:  Estimated risk of cardiac complications after non-cardiac surgery using the Revised Cardiac Risk Index for Preoperative risk is 6.0 %      ARISCAT (Canet) risk index: Low: 1.6% risk of post-op pulmonary complications.    American Society of Anesthesiologists Physical Status classification (ASA): 3         No further cardiac workup needed prior to surgery.    Outpatient Subjective & Objective

## 2023-12-06 ENCOUNTER — PATIENT OUTREACH (OUTPATIENT)
Dept: ADMINISTRATIVE | Facility: HOSPITAL | Age: 58
End: 2023-12-06
Payer: MEDICAID

## 2023-12-06 NOTE — PROGRESS NOTES
Health Maintenance Due   Topic Date Due    COVID-19 Vaccine (1) Never done    Pneumococcal Vaccines (Age 0-64) (1 - PCV) Never done    Shingles Vaccine (1 of 2) Never done    Mammogram  08/25/2021    RSV Vaccine (Age 60+ and Pregnant patients) (1 - 1-dose 60+ series) Never done    Influenza Vaccine (1) 09/01/2023     - LVM to contact the offic eto schedule Mammogram.

## 2023-12-06 NOTE — LETTER
December 6, 2023    [facility]  [address] [place] [address]             Dear Edita    In addition to helping you feel better when you are sick, we are interested in preventing illness and injury in the first place.  Screening tests and routine follow up tests when you have certain medical problems are very essential in helping you stay as healthy as possible. In the spirit of maintaining your health, our system indicates that you are due for the following:       Health Maintenance Due   Topic Date Due    COVID-19 Vaccine (1) Never done    Pneumococcal Vaccines (Age 0-64) (1 - PCV) Never done    Shingles Vaccine (1 of 2) Never done    Mammogram  08/25/2021    RSV Vaccine (Age 60+ and Pregnant patients) (1 - 1-dose 60+ series) Never done    Influenza Vaccine (1) 09/01/2023        Please be prepared to discuss these recommended tests at your next visit.  If you haven't had a visit within the past one year please call [phone] to schedule or request an appointment.    Sincerely,       Your Health Care Team

## 2023-12-07 ENCOUNTER — HOSPITAL ENCOUNTER (INPATIENT)
Facility: HOSPITAL | Age: 58
LOS: 6 days | Discharge: HOME | DRG: 699 | End: 2023-12-13
Attending: EMERGENCY MEDICINE | Admitting: STUDENT IN AN ORGANIZED HEALTH CARE EDUCATION/TRAINING PROGRAM
Payer: MEDICAID

## 2023-12-07 DIAGNOSIS — R07.9 CHEST PAIN: ICD-10-CM

## 2023-12-07 DIAGNOSIS — N13.1 HYDRONEPHROSIS WITH URETERAL STRICTURE, NOT ELSEWHERE CLASSIFIED: ICD-10-CM

## 2023-12-07 DIAGNOSIS — R53.1 WEAKNESS: ICD-10-CM

## 2023-12-07 DIAGNOSIS — Z93.2 ILEOSTOMY IN PLACE: Chronic | ICD-10-CM

## 2023-12-07 DIAGNOSIS — R00.0 TACHYCARDIA: ICD-10-CM

## 2023-12-07 DIAGNOSIS — N17.9 AKI (ACUTE KIDNEY INJURY): Primary | ICD-10-CM

## 2023-12-07 DIAGNOSIS — Z93.6 NEPHROSTOMY STATUS: Chronic | ICD-10-CM

## 2023-12-07 DIAGNOSIS — N12 PYELONEPHRITIS: ICD-10-CM

## 2023-12-07 PROBLEM — R79.89 AZOTEMIA: Status: ACTIVE | Noted: 2023-12-07

## 2023-12-07 PROBLEM — K21.00 GASTRO-ESOPHAGEAL REFLUX DISEASE WITH ESOPHAGITIS: Status: ACTIVE | Noted: 2017-11-16

## 2023-12-07 PROBLEM — J30.1 ALLERGIC RHINITIS DUE TO POLLEN: Status: ACTIVE | Noted: 2023-12-07

## 2023-12-07 LAB
ALBUMIN SERPL BCP-MCNC: 3.1 G/DL (ref 3.5–5.2)
ALLENS TEST: NORMAL
ALP SERPL-CCNC: 141 U/L (ref 55–135)
ALT SERPL W/O P-5'-P-CCNC: 35 U/L (ref 10–44)
ANION GAP SERPL CALC-SCNC: 9 MMOL/L (ref 8–16)
AST SERPL-CCNC: 33 U/L (ref 10–40)
BACTERIA #/AREA URNS AUTO: ABNORMAL /HPF
BASOPHILS # BLD AUTO: 0.04 K/UL (ref 0–0.2)
BASOPHILS NFR BLD: 0.3 % (ref 0–1.9)
BILIRUB SERPL-MCNC: 0.5 MG/DL (ref 0.1–1)
BILIRUB UR QL STRIP: NEGATIVE
BUN SERPL-MCNC: 37 MG/DL (ref 6–20)
BUN SERPL-MCNC: 62 MG/DL (ref 6–30)
CALCIUM SERPL-MCNC: 8.7 MG/DL (ref 8.7–10.5)
CHLORIDE SERPL-SCNC: 114 MMOL/L (ref 95–110)
CHLORIDE SERPL-SCNC: 116 MMOL/L (ref 95–110)
CLARITY UR REFRACT.AUTO: ABNORMAL
CO2 SERPL-SCNC: 12 MMOL/L (ref 23–29)
COLOR UR AUTO: ABNORMAL
CREAT SERPL-MCNC: 1.6 MG/DL (ref 0.5–1.4)
CREAT SERPL-MCNC: 2.2 MG/DL (ref 0.5–1.4)
DIFFERENTIAL METHOD: ABNORMAL
EOSINOPHIL # BLD AUTO: 0.1 K/UL (ref 0–0.5)
EOSINOPHIL NFR BLD: 0.7 % (ref 0–8)
ERYTHROCYTE [DISTWIDTH] IN BLOOD BY AUTOMATED COUNT: 15.2 % (ref 11.5–14.5)
EST. GFR  (NO RACE VARIABLE): 36.7 ML/MIN/1.73 M^2
GLUCOSE SERPL-MCNC: 110 MG/DL (ref 70–110)
GLUCOSE SERPL-MCNC: 134 MG/DL (ref 70–110)
GLUCOSE UR QL STRIP: NEGATIVE
HCT VFR BLD AUTO: 46.9 % (ref 37–48.5)
HCT VFR BLD CALC: 50 %PCV (ref 36–54)
HGB BLD-MCNC: 15.3 G/DL (ref 12–16)
HGB UR QL STRIP: ABNORMAL
HYALINE CASTS UR QL AUTO: 0 /LPF
IMM GRANULOCYTES # BLD AUTO: 0.04 K/UL (ref 0–0.04)
IMM GRANULOCYTES NFR BLD AUTO: 0.3 % (ref 0–0.5)
INFLUENZA A, MOLECULAR: NEGATIVE
INFLUENZA B, MOLECULAR: NEGATIVE
KETONES UR QL STRIP: NEGATIVE
LDH SERPL L TO P-CCNC: 1.19 MMOL/L (ref 0.5–2.2)
LEUKOCYTE ESTERASE UR QL STRIP: ABNORMAL
LIPASE SERPL-CCNC: 44 U/L (ref 4–60)
LYMPHOCYTES # BLD AUTO: 1.6 K/UL (ref 1–4.8)
LYMPHOCYTES NFR BLD: 11 % (ref 18–48)
MCH RBC QN AUTO: 27.7 PG (ref 27–31)
MCHC RBC AUTO-ENTMCNC: 32.6 G/DL (ref 32–36)
MCV RBC AUTO: 85 FL (ref 82–98)
MICROSCOPIC COMMENT: ABNORMAL
MONOCYTES # BLD AUTO: 1.6 K/UL (ref 0.3–1)
MONOCYTES NFR BLD: 10.9 % (ref 4–15)
NEUTROPHILS # BLD AUTO: 11 K/UL (ref 1.8–7.7)
NEUTROPHILS NFR BLD: 76.8 % (ref 38–73)
NITRITE UR QL STRIP: NEGATIVE
NRBC BLD-RTO: 0 /100 WBC
PH UR STRIP: 7 [PH] (ref 5–8)
PLATELET # BLD AUTO: 363 K/UL (ref 150–450)
PLATELET BLD QL SMEAR: ABNORMAL
PMV BLD AUTO: 9.9 FL (ref 9.2–12.9)
POC IONIZED CALCIUM: 1.16 MMOL/L (ref 1.06–1.42)
POC TCO2 (MEASURED): 17 MMOL/L (ref 23–29)
POTASSIUM BLD-SCNC: 5.9 MMOL/L (ref 3.5–5.1)
POTASSIUM SERPL-SCNC: 4.4 MMOL/L (ref 3.5–5.1)
PROT SERPL-MCNC: 8 G/DL (ref 6–8.4)
PROT UR QL STRIP: ABNORMAL
RBC # BLD AUTO: 5.52 M/UL (ref 4–5.4)
RBC #/AREA URNS AUTO: 43 /HPF (ref 0–4)
SAMPLE: ABNORMAL
SAMPLE: NORMAL
SARS-COV-2 RDRP RESP QL NAA+PROBE: NEGATIVE
SITE: NORMAL
SODIUM BLD-SCNC: 137 MMOL/L (ref 136–145)
SODIUM SERPL-SCNC: 137 MMOL/L (ref 136–145)
SP GR UR STRIP: 1.01 (ref 1–1.03)
SPECIMEN SOURCE: NORMAL
SQUAMOUS #/AREA URNS AUTO: 4 /HPF
URN SPEC COLLECT METH UR: ABNORMAL
WBC # BLD AUTO: 14.24 K/UL (ref 3.9–12.7)
WBC #/AREA URNS AUTO: >100 /HPF (ref 0–5)
WBC CLUMPS UR QL AUTO: ABNORMAL

## 2023-12-07 PROCEDURE — 87186 SC STD MICRODIL/AGAR DIL: CPT

## 2023-12-07 PROCEDURE — 25000003 PHARM REV CODE 250

## 2023-12-07 PROCEDURE — 99900035 HC TECH TIME PER 15 MIN (STAT)

## 2023-12-07 PROCEDURE — G0378 HOSPITAL OBSERVATION PER HR: HCPCS

## 2023-12-07 PROCEDURE — 87088 URINE BACTERIA CULTURE: CPT

## 2023-12-07 PROCEDURE — 63600175 PHARM REV CODE 636 W HCPCS

## 2023-12-07 PROCEDURE — 83690 ASSAY OF LIPASE: CPT | Performed by: EMERGENCY MEDICINE

## 2023-12-07 PROCEDURE — 87086 URINE CULTURE/COLONY COUNT: CPT

## 2023-12-07 PROCEDURE — 87205 SMEAR GRAM STAIN: CPT

## 2023-12-07 PROCEDURE — 96374 THER/PROPH/DIAG INJ IV PUSH: CPT

## 2023-12-07 PROCEDURE — 81001 URINALYSIS AUTO W/SCOPE: CPT

## 2023-12-07 PROCEDURE — U0002 COVID-19 LAB TEST NON-CDC: HCPCS

## 2023-12-07 PROCEDURE — 85025 COMPLETE CBC W/AUTO DIFF WBC: CPT | Performed by: NURSE PRACTITIONER

## 2023-12-07 PROCEDURE — 93010 EKG 12-LEAD: ICD-10-PCS | Mod: ,,, | Performed by: INTERNAL MEDICINE

## 2023-12-07 PROCEDURE — 96361 HYDRATE IV INFUSION ADD-ON: CPT

## 2023-12-07 PROCEDURE — 87040 BLOOD CULTURE FOR BACTERIA: CPT | Mod: 59

## 2023-12-07 PROCEDURE — 80053 COMPREHEN METABOLIC PANEL: CPT | Performed by: EMERGENCY MEDICINE

## 2023-12-07 PROCEDURE — 63600175 PHARM REV CODE 636 W HCPCS: Performed by: NURSE PRACTITIONER

## 2023-12-07 PROCEDURE — 87502 INFLUENZA DNA AMP PROBE: CPT

## 2023-12-07 PROCEDURE — 12000002 HC ACUTE/MED SURGE SEMI-PRIVATE ROOM

## 2023-12-07 PROCEDURE — 93005 ELECTROCARDIOGRAM TRACING: CPT

## 2023-12-07 PROCEDURE — 99285 EMERGENCY DEPT VISIT HI MDM: CPT | Mod: 25

## 2023-12-07 PROCEDURE — 93010 ELECTROCARDIOGRAM REPORT: CPT | Mod: ,,, | Performed by: INTERNAL MEDICINE

## 2023-12-07 PROCEDURE — 87077 CULTURE AEROBIC IDENTIFY: CPT

## 2023-12-07 PROCEDURE — 96375 TX/PRO/DX INJ NEW DRUG ADDON: CPT

## 2023-12-07 PROCEDURE — 83605 ASSAY OF LACTIC ACID: CPT

## 2023-12-07 RX ORDER — ONDANSETRON 2 MG/ML
4 INJECTION INTRAMUSCULAR; INTRAVENOUS
Status: COMPLETED | OUTPATIENT
Start: 2023-12-07 | End: 2023-12-07

## 2023-12-07 RX ORDER — MORPHINE SULFATE 4 MG/ML
4 INJECTION, SOLUTION INTRAMUSCULAR; INTRAVENOUS
Status: COMPLETED | OUTPATIENT
Start: 2023-12-07 | End: 2023-12-07

## 2023-12-07 RX ADMIN — ONDANSETRON 4 MG: 2 INJECTION INTRAMUSCULAR; INTRAVENOUS at 07:12

## 2023-12-07 RX ADMIN — MORPHINE SULFATE 4 MG: 4 INJECTION INTRAVENOUS at 07:12

## 2023-12-07 RX ADMIN — SODIUM CHLORIDE 1000 ML: 9 INJECTION, SOLUTION INTRAVENOUS at 07:12

## 2023-12-07 NOTE — FIRST PROVIDER EVALUATION
Emergency Department TeleTriage Encounter Note      CHIEF COMPLAINT    Chief Complaint   Patient presents with    Vomiting     Arrives via EMS with c/o N/V that started 3 days        VITAL SIGNS   Initial Vitals [12/07/23 1648]   BP Pulse Resp Temp SpO2   110/80 110 18 98 °F (36.7 °C) 96 %      MAP       --            ALLERGIES    Review of patient's allergies indicates:   Allergen Reactions    Bee sting [allergen ext-venom-honey bee]      Rash      Grass pollen-bermuda, standard      rash       PROVIDER TRIAGE NOTE  TeleTriage Note: Edita Riley, a nontoxic/well appearing, 58 y.o. female, presented to the ED with c/o generalized abdominal pain with N/V for the past 3 days. Denies fevers.     All ED beds are full at present; patient notified of this status.  Patient seen and medically screened by Nurse Practitioner via teletriage. Orders initiated at triage to expedite care.  Patient is stable to return to the waiting room and will be placed in an ED bed when available.  Care will be transferred to an alternate provider when patient has been placed in an Exam Room from the [facility] for physical exam, additional orders, and disposition.  5:13 PM Linda Wiley, DNP, FNP-C        ORDERS  Labs Reviewed   CBC W/ AUTO DIFFERENTIAL   COMPREHENSIVE METABOLIC PANEL   LIPASE   URINALYSIS, REFLEX TO URINE CULTURE   ISTAT CHEM8       ED Orders (720h ago, onward)      Start Ordered     Status Ordering Provider    12/07/23 1715 12/07/23 1713  ondansetron injection 4 mg  ED 1 Time         Ordered LINDA WILEY    12/07/23 1714 12/07/23 1713  Vital signs  Every 2 hours         Ordered LINDA WILEY    12/07/23 1714 12/07/23 1713  Diet NPO  Diet effective now         Ordered LINDA WILEY    12/07/23 1714 12/07/23 1713  Insert peripheral IV  Once         Ordered LINDA WILEY    12/07/23 1714 12/07/23 1713  CBC W/ AUTO DIFFERENTIAL  STAT         Ordered LINDA WILEY    12/07/23 1714 12/07/23  1713  Comp. Metabolic Panel  STAT         Ordered KENNYJODIBriana    12/07/23 1714 12/07/23 1713  ISTAT CHEM8  Once         Ordered KENNY, JODI CLEMENTBriana    12/07/23 1714 12/07/23 1713  Lipase  STAT         Ordered KENNY, JODI CLEMENTBriana    12/07/23 1714 12/07/23 1713  Urinalysis, Reflex to Urine Culture Urine, Clean Catch  STAT         Ordered KENNY, JODI CLEMENTBriana    12/07/23 1714 12/07/23 1713  EKG 12-lead  Once         Ordered KENNYJODIBriana              Virtual Visit Note: The provider triage portion of this emergency department evaluation and documentation was performed via [employer], a HIPAA-compliant telemedicine application, in concert with a tele-presenter in the room. A face to face patient evaluation with one of my colleagues will occur once the patient is placed in an emergency department room.      DISCLAIMER: This note was prepared with [employer] voice recognition transcription software. Garbled syntax, mangled pronouns, and other bizarre constructions may be attributed to that software system.

## 2023-12-08 PROBLEM — E87.20 METABOLIC ACIDOSIS, NAG, BICARBONATE LOSSES: Status: ACTIVE | Noted: 2023-12-08

## 2023-12-08 PROBLEM — R53.1 WEAKNESS: Status: ACTIVE | Noted: 2023-12-08

## 2023-12-08 LAB
ALBUMIN SERPL BCP-MCNC: 3.1 G/DL (ref 3.5–5.2)
ANION GAP SERPL CALC-SCNC: 9 MMOL/L (ref 8–16)
BASOPHILS # BLD AUTO: 0.05 K/UL (ref 0–0.2)
BASOPHILS NFR BLD: 0.4 % (ref 0–1.9)
BUN SERPL-MCNC: 39 MG/DL (ref 6–20)
CALCIUM SERPL-MCNC: 9.6 MG/DL (ref 8.7–10.5)
CHLORIDE SERPL-SCNC: 116 MMOL/L (ref 95–110)
CO2 SERPL-SCNC: 15 MMOL/L (ref 23–29)
CREAT SERPL-MCNC: 1.9 MG/DL (ref 0.5–1.4)
CREAT UR-MCNC: 79 MG/DL (ref 15–325)
DIFFERENTIAL METHOD: ABNORMAL
EOSINOPHIL # BLD AUTO: 0.1 K/UL (ref 0–0.5)
EOSINOPHIL NFR BLD: 0.7 % (ref 0–8)
ERYTHROCYTE [DISTWIDTH] IN BLOOD BY AUTOMATED COUNT: 15.2 % (ref 11.5–14.5)
EST. GFR  (NO RACE VARIABLE): 29.9 ML/MIN/1.73 M^2
GLUCOSE SERPL-MCNC: 128 MG/DL (ref 70–110)
GRAM STN SPEC: NORMAL
HCT VFR BLD AUTO: 41.2 % (ref 37–48.5)
HGB BLD-MCNC: 12.7 G/DL (ref 12–16)
IMM GRANULOCYTES # BLD AUTO: 0.04 K/UL (ref 0–0.04)
IMM GRANULOCYTES NFR BLD AUTO: 0.3 % (ref 0–0.5)
LACTATE SERPL-SCNC: 2.2 MMOL/L (ref 0.5–2.2)
LYMPHOCYTES # BLD AUTO: 1.5 K/UL (ref 1–4.8)
LYMPHOCYTES NFR BLD: 10.8 % (ref 18–48)
MAGNESIUM SERPL-MCNC: 2.1 MG/DL (ref 1.6–2.6)
MCH RBC QN AUTO: 28 PG (ref 27–31)
MCHC RBC AUTO-ENTMCNC: 30.8 G/DL (ref 32–36)
MCV RBC AUTO: 91 FL (ref 82–98)
MONOCYTES # BLD AUTO: 1.9 K/UL (ref 0.3–1)
MONOCYTES NFR BLD: 13.9 % (ref 4–15)
NEUTROPHILS # BLD AUTO: 10.1 K/UL (ref 1.8–7.7)
NEUTROPHILS NFR BLD: 73.9 % (ref 38–73)
NRBC BLD-RTO: 0 /100 WBC
PHOSPHATE SERPL-MCNC: 3.9 MG/DL (ref 2.7–4.5)
PLATELET # BLD AUTO: 323 K/UL (ref 150–450)
PMV BLD AUTO: 10.2 FL (ref 9.2–12.9)
POTASSIUM SERPL-SCNC: 4.2 MMOL/L (ref 3.5–5.1)
RBC # BLD AUTO: 4.54 M/UL (ref 4–5.4)
SODIUM SERPL-SCNC: 140 MMOL/L (ref 136–145)
SODIUM UR-SCNC: <10 MMOL/L (ref 20–250)
WBC # BLD AUTO: 13.66 K/UL (ref 3.9–12.7)

## 2023-12-08 PROCEDURE — 99223 PR INITIAL HOSPITAL CARE,LEVL III: ICD-10-PCS | Mod: ,,, | Performed by: PHYSICIAN ASSISTANT

## 2023-12-08 PROCEDURE — 84300 ASSAY OF URINE SODIUM: CPT | Performed by: STUDENT IN AN ORGANIZED HEALTH CARE EDUCATION/TRAINING PROGRAM

## 2023-12-08 PROCEDURE — 99223 1ST HOSP IP/OBS HIGH 75: CPT | Mod: ,,, | Performed by: PHYSICIAN ASSISTANT

## 2023-12-08 PROCEDURE — 25000003 PHARM REV CODE 250: Performed by: HOSPITALIST

## 2023-12-08 PROCEDURE — 63600175 PHARM REV CODE 636 W HCPCS: Performed by: HOSPITALIST

## 2023-12-08 PROCEDURE — 36415 COLL VENOUS BLD VENIPUNCTURE: CPT | Performed by: HOSPITALIST

## 2023-12-08 PROCEDURE — 85025 COMPLETE CBC W/AUTO DIFF WBC: CPT | Performed by: HOSPITALIST

## 2023-12-08 PROCEDURE — 83605 ASSAY OF LACTIC ACID: CPT | Performed by: HOSPITALIST

## 2023-12-08 PROCEDURE — 25000003 PHARM REV CODE 250: Performed by: STUDENT IN AN ORGANIZED HEALTH CARE EDUCATION/TRAINING PROGRAM

## 2023-12-08 PROCEDURE — 21400001 HC TELEMETRY ROOM

## 2023-12-08 PROCEDURE — 80069 RENAL FUNCTION PANEL: CPT | Performed by: HOSPITALIST

## 2023-12-08 PROCEDURE — 82570 ASSAY OF URINE CREATININE: CPT | Performed by: STUDENT IN AN ORGANIZED HEALTH CARE EDUCATION/TRAINING PROGRAM

## 2023-12-08 PROCEDURE — 87040 BLOOD CULTURE FOR BACTERIA: CPT | Performed by: HOSPITALIST

## 2023-12-08 PROCEDURE — 83735 ASSAY OF MAGNESIUM: CPT | Performed by: HOSPITALIST

## 2023-12-08 RX ORDER — PROCHLORPERAZINE EDISYLATE 5 MG/ML
5 INJECTION INTRAMUSCULAR; INTRAVENOUS EVERY 6 HOURS PRN
Status: DISCONTINUED | OUTPATIENT
Start: 2023-12-08 | End: 2023-12-13 | Stop reason: HOSPADM

## 2023-12-08 RX ORDER — HYDROMORPHONE HYDROCHLORIDE 1 MG/ML
1 INJECTION, SOLUTION INTRAMUSCULAR; INTRAVENOUS; SUBCUTANEOUS EVERY 4 HOURS PRN
Status: DISCONTINUED | OUTPATIENT
Start: 2023-12-08 | End: 2023-12-13 | Stop reason: HOSPADM

## 2023-12-08 RX ORDER — MIRTAZAPINE 15 MG/1
30 TABLET, FILM COATED ORAL NIGHTLY
Status: DISCONTINUED | OUTPATIENT
Start: 2023-12-08 | End: 2023-12-13 | Stop reason: HOSPADM

## 2023-12-08 RX ORDER — ENOXAPARIN SODIUM 100 MG/ML
40 INJECTION SUBCUTANEOUS EVERY 24 HOURS
Status: DISCONTINUED | OUTPATIENT
Start: 2023-12-08 | End: 2023-12-13 | Stop reason: HOSPADM

## 2023-12-08 RX ORDER — CETIRIZINE HYDROCHLORIDE 10 MG/1
10 TABLET ORAL DAILY PRN
Status: DISCONTINUED | OUTPATIENT
Start: 2023-12-08 | End: 2023-12-13 | Stop reason: HOSPADM

## 2023-12-08 RX ORDER — HYDROMORPHONE HYDROCHLORIDE 1 MG/ML
1 INJECTION, SOLUTION INTRAMUSCULAR; INTRAVENOUS; SUBCUTANEOUS ONCE
Status: DISCONTINUED | OUTPATIENT
Start: 2023-12-08 | End: 2023-12-13 | Stop reason: HOSPADM

## 2023-12-08 RX ORDER — SODIUM CHLORIDE 0.9 % (FLUSH) 0.9 %
10 SYRINGE (ML) INJECTION EVERY 6 HOURS PRN
Status: DISCONTINUED | OUTPATIENT
Start: 2023-12-08 | End: 2023-12-13 | Stop reason: HOSPADM

## 2023-12-08 RX ORDER — ONDANSETRON 2 MG/ML
4 INJECTION INTRAMUSCULAR; INTRAVENOUS EVERY 8 HOURS PRN
Status: DISCONTINUED | OUTPATIENT
Start: 2023-12-08 | End: 2023-12-13 | Stop reason: HOSPADM

## 2023-12-08 RX ORDER — MICONAZOLE NITRATE 2 %
POWDER (GRAM) TOPICAL 2 TIMES DAILY
Status: DISCONTINUED | OUTPATIENT
Start: 2023-12-08 | End: 2023-12-13 | Stop reason: HOSPADM

## 2023-12-08 RX ORDER — ACETAMINOPHEN 325 MG/1
650 TABLET ORAL EVERY 4 HOURS PRN
Status: DISCONTINUED | OUTPATIENT
Start: 2023-12-08 | End: 2023-12-13 | Stop reason: HOSPADM

## 2023-12-08 RX ORDER — TALC
6 POWDER (GRAM) TOPICAL NIGHTLY PRN
Status: DISCONTINUED | OUTPATIENT
Start: 2023-12-08 | End: 2023-12-13 | Stop reason: HOSPADM

## 2023-12-08 RX ORDER — POLYETHYLENE GLYCOL 3350 17 G/17G
17 POWDER, FOR SOLUTION ORAL 2 TIMES DAILY PRN
Status: DISCONTINUED | OUTPATIENT
Start: 2023-12-08 | End: 2023-12-13 | Stop reason: HOSPADM

## 2023-12-08 RX ORDER — NALOXONE HCL 0.4 MG/ML
0.02 VIAL (ML) INJECTION
Status: DISCONTINUED | OUTPATIENT
Start: 2023-12-08 | End: 2023-12-13 | Stop reason: HOSPADM

## 2023-12-08 RX ORDER — SODIUM BICARBONATE 650 MG/1
650 TABLET ORAL 3 TIMES DAILY
Status: DISCONTINUED | OUTPATIENT
Start: 2023-12-08 | End: 2023-12-11

## 2023-12-08 RX ADMIN — CEFEPIME 1 G: 1 INJECTION, POWDER, FOR SOLUTION INTRAMUSCULAR; INTRAVENOUS at 02:12

## 2023-12-08 RX ADMIN — CEFEPIME 1 G: 1 INJECTION, POWDER, FOR SOLUTION INTRAMUSCULAR; INTRAVENOUS at 11:12

## 2023-12-08 RX ADMIN — MIRTAZAPINE 30 MG: 15 TABLET, FILM COATED ORAL at 09:12

## 2023-12-08 RX ADMIN — MICONAZOLE NITRATE: 20 POWDER TOPICAL at 11:12

## 2023-12-08 RX ADMIN — SODIUM BICARBONATE 650 MG TABLET 650 MG: at 09:12

## 2023-12-08 RX ADMIN — SODIUM CHLORIDE, POTASSIUM CHLORIDE, SODIUM LACTATE AND CALCIUM CHLORIDE 1000 ML: 600; 310; 30; 20 INJECTION, SOLUTION INTRAVENOUS at 03:12

## 2023-12-08 RX ADMIN — ENOXAPARIN SODIUM 40 MG: 40 INJECTION SUBCUTANEOUS at 05:12

## 2023-12-08 RX ADMIN — SODIUM BICARBONATE 650 MG TABLET 650 MG: at 02:12

## 2023-12-08 RX ADMIN — MICONAZOLE NITRATE: 20 POWDER TOPICAL at 09:12

## 2023-12-08 NOTE — ASSESSMENT & PLAN NOTE
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

## 2023-12-08 NOTE — PROGRESS NOTES
[facility] [address] [place] [address] Medicine  Progress Note    Patient Name: Edita Riely  MRN: [id number]  Patient Class: IP- Inpatient   Admission Date: 12/7/2023  Length of Stay: 0 days  Attending Physician: Jamar Campbell MD  Primary Care Provider: Trina Vu MD        Subjective:     Principal Problem:Pyelonephritis        HPI:  57 y/o AAF hx of Cervical Cancer s/p XRT & Bilateral Ureteral strictures with obstructive uropathy now s/p transverse colon conduit, s/p Left Nephrostomy tube, s/p Ileostomy, hx of MDR UTI/pyelonephritis presents to the ED with complaints of abdominal pain.  She reports since Monday 12/4, she has had recurrent nausea vomiting over 3 days, poor diet/oral intake with associated weakness.     2 recent hospitalizations with left sided pyelonephritis, prior urine cultures show different MDR bacteria that have grown.  She is pending evaluation by colo-rectal surgery for outpatient revision of urostomy, follows with Dr. Balbuena (urology)     Reports having epigastric pain 10/10 on arrival,  9/10 during interview, only mild relief with initial morphine dosage.   She does not note any change in the ileostomy stool output, no reported change in amount or quality from her urostomy or her left nephrostomy tube. She denies any fevers of chills.     ED Treatment: Morphine 4mg IV,  Zofran 4mg IV, NS 1Liter    Overview/Hospital Course:  No notes on file    Interval History: Patient reports L flank pain and tenderness. She has hx of MDRO and  VRE UTI. Consulted ID. Continuing cefepime for now pending ID eval. Urine cx pending. Urostomy bags draining well . Cr 1.9 from baseline 1.3.   Objective:     Vital Signs (Most Recent):  Temp: 97.7 °F (36.5 °C) (12/08/23 0752)  Pulse: 89 (12/08/23 1125)  Resp: 18 (12/08/23 0752)  BP: (!) 108/56 (12/08/23 0752)  SpO2: 100 % (12/08/23 0752) Vital Signs (24h Range):  Temp:  [97.7 °F (36.5 °C)-98.4 °F (36.9 °C)] 97.7 °F (36.5 °C)  Pulse:  []  89  Resp:  [17-18] 18  SpO2:  [96 %-100 %] 100 %  BP: (102-110)/(56-80) 108/56     Weight: 83.2 kg (183 lb 6.8 oz)  Body mass index is 28.73 kg/m².    Intake/Output Summary (Last 24 hours) at 12/8/2023 1228  Last data filed at 12/7/2023 2013  Gross per 24 hour   Intake 999 ml   Output --   Net 999 ml           Physical Exam  Vitals and nursing note reviewed.   Constitutional:       Appearance: She is ill-appearing.   Eyes:      General: No scleral icterus.     Pupils: Pupils are equal, round, and reactive to light.   Cardiovascular:      Rate and Rhythm: Normal rate and regular rhythm.      Pulses: Normal pulses.   Pulmonary:      Effort: Pulmonary effort is normal. No respiratory distress.      Breath sounds: No wheezing.   Abdominal:      Tenderness: There is abdominal tenderness (epigastric).      Comments: RLQ Ileostomy with brown liquid stool output, surrounding abdominal wall near ileostomy with erythema/hyperpigmented appearance.     LUQ urostomy  Left Flank nephrostomy tube   Skin:     General: Skin is warm and dry.   Neurological:      General: No focal deficit present.      Mental Status: She is alert.             Assessment/Plan:      * Pyelonephritis  -patient with hx of MDRO and VRE and recent bouts pyelonephritis, with similar symptoms, epigastric tenderness, leukocytosis, pyuria  -Start Cefepime renal dosed 1gram [id number]   -Add-On Urine gram stain   - Follow up urine cx and  blood cultures  -PRN IV dilaudid for pain control  - Consulting ID     Metabolic acidosis, NAG, bicarbonate losses  Patient with ileostomy, patient reporting increased output recently  -Continue home oral sodium bicarbonate   -If Serum Bicarb is downtrending- she may require bicarbonate infusion    Trend Lactic acid      Weakness  Suspect related to acute cystitis vs pyelonephritis       Azotemia  Mild creatinine elevation but rising BUN, suspect a pre-renal etiology related to nausea/vomiting and decreased oral intake  -Trend  Renal function panel      Essential hypertension  patient is not on any chronic oral therapy at this time    CKD (chronic kidney disease), stage III  [place] on CKD   BMP reviewed- noted Estimated Creatinine Clearance: 34.9 mL/min (A) (based on SCr of 1.9 mg/dL (H)). according to latest data. Based on current GFR, CKD stage is stage 3 - GFR 30-59.  Monitor UOP and serial BMP and adjust therapy as needed. Renally dose meds. Avoid nephrotoxic medications and procedures.    Cr 1.9, baseline 1.4  Likely iso of pyelonephritis  Obtain urine studies and retroperitoneal USG     Nephrostomy status  Left Nephrostomy draining urine- if any reduced UOp or ODESSA developing - obtain renal imaging to r/o nephrostomy tube dysfunction      Presence of urostomy  Consult wound care re: urostomy care supplies while patient is admitted      History of DVT (deep vein thrombosis)  -not on chronic anticoagulation  -continue sq enoxaparin DVT ppx      Ileostomy in place  Consult wound care re: ileostomy care supplies while patient is admitted        VTE Risk Mitigation (From admission, onward)           Ordered     enoxaparin injection 40 mg  Daily         12/08/23 0225     IP VTE HIGH RISK PATIENT  Once         12/08/23 0225     Place sequential compression device  Until discontinued         12/08/23 0225                    Discharge Planning   OK: 12/9/2023     Code Status: Full Code   Is the patient medically ready for discharge?:     Reason for patient still in hospital (select all that apply): Patient trending condition  Discharge Plan A: Home, Home with family                  Jamar Campbell MD  Department of Hospital Medicine   [facility]

## 2023-12-08 NOTE — ED NOTES
Bed: [facility]  Expected date:   Expected time:   Means of arrival:   Comments:  intake

## 2023-12-08 NOTE — PROGRESS NOTES
[facility] [address] [place] [address] Medicine  Progress Note    Patient Name: Edita Riley  MRN: [id number]  Patient Class: IP- Inpatient   Admission Date: 12/7/2023  Length of Stay: 0 days  Attending Physician: Jamar Campbell MD  Primary Care Provider: Trina Vu MD        Subjective:     Principal Problem:Pyelonephritis        HPI:  57 y/o AAF hx of Cervical Cancer s/p XRT & Bilateral Ureteral strictures with obstructive uropathy now s/p transverse colon conduit, s/p Left Nephrostomy tube, s/p Ileostomy, hx of MDR UTI/pyelonephritis presents to the ED with complaints of abdominal pain.  She reports since Monday 12/4, she has had recurrent nausea vomiting over 3 days, poor diet/oral intake with associated weakness.     2 recent hospitalizations with left sided pyelonephritis, prior urine cultures show different MDR bacteria that have grown.  She is pending evaluation by colo-rectal surgery for outpatient revision of urostomy, follows with Dr. Balbuena (urology)     Reports having epigastric pain 10/10 on arrival,  9/10 during interview, only mild relief with initial morphine dosage.   She does not note any change in the ileostomy stool output, no reported change in amount or quality from her urostomy or her left nephrostomy tube. She denies any fevers of chills.     ED Treatment: Morphine 4mg IV,  Zofran 4mg IV, NS 1Liter    Overview/Hospital Course:  No notes on file    No new subjective & objective note has been filed under this hospital service since the last note was generated.      Assessment/Plan:      * Pyelonephritis  -patient with hx of MDRO and VRE and recent bouts pyelonephritis, with similar symptoms, epigastric tenderness, leukocytosis, pyuria  -Start Cefepime renal dosed 1gram [id number]   -Add-On Urine gram stain   - Follow up urine cx and  blood cultures  -PRN IV dilaudid for pain control  - Consulting ID     Metabolic acidosis, NAG, bicarbonate losses  Patient with ileostomy,  patient reporting increased output recently  -Continue home oral sodium bicarbonate   -If Serum Bicarb is downtrending- she may require bicarbonate infusion    Trend Lactic acid      Weakness  Suspect related to acute cystitis vs pyelonephritis       Azotemia  Mild creatinine elevation but rising BUN, suspect a pre-renal etiology related to nausea/vomiting and decreased oral intake  -Trend Renal function panel      Essential hypertension  patient is not on any chronic oral therapy at this time    CKD (chronic kidney disease), stage III  Creatine stable for now. BMP reviewed- noted Estimated Creatinine Clearance: 43 mL/min (A) (based on SCr of 1.6 mg/dL (H)). according to latest data. Based on current GFR, CKD stage is stage 3 - GFR 30-59.  Monitor UOP and serial BMP and adjust therapy as needed. Renally dose meds. Avoid nephrotoxic medications and procedures.    Nephrostomy status  Left Nephrostomy draining urine- if any reduced UOp or ODESSA developing - obtain renal imaging to r/o nephrostomy tube dysfunction      Presence of urostomy  Consult wound care re: urostomy care supplies while patient is admitted      History of DVT (deep vein thrombosis)  -not on chronic anticoagulation  -continue sq enoxaparin DVT ppx      Ileostomy in place  Consult wound care re: ileostomy care supplies while patient is admitted        VTE Risk Mitigation (From admission, onward)           Ordered     enoxaparin injection 40 mg  Daily         12/08/23 0225     IP VTE HIGH RISK PATIENT  Once         12/08/23 0225     Place sequential compression device  Until discontinued         12/08/23 0225                    Discharge Planning   OK: 12/9/2023     Code Status: Full Code   Is the patient medically ready for discharge?:     Reason for patient still in hospital (select all that apply): Patient trending condition  Discharge Plan A: Home, Home with family                  Jamar Campbell MD  Department of Hospital Medicine   [facility]

## 2023-12-08 NOTE — NURSING
Patient arrived via stretcher. Moved to bed 2 person assist. Patient was able to log roll once in bed. Right side abdomen patient has illeostomy noted excoriation around it, skin pink ,edema/ open areas. Right buttocks meplex placed. Bruising noted upper arm. Verbalized understanding for plan of care. Call system reviewed and fall precautions reviewed, patient verbalized understanding.per patient no other needs at this time.

## 2023-12-08 NOTE — ED NOTES
I-STAT Chem-8+ Results:   Value Reference Range   Sodium 137 136-145 mmol/L   Potassium  5.9 3.5-5.1 mmol/L   Chloride 114  mmol/L   Ionized Calcium 1.16 1.06-1.42 mmol/L   CO2 (measured) 17 23-29 mmol/L   Glucose 134  mg/dL   BUN 62 6-30 mg/dL   Creatinine 2.2 0.5-1.4 mg/dL   Hematocrit 50 36-54%

## 2023-12-08 NOTE — NURSING
AAOx4, report received from [facility].  Pt. Lying supine, pain rated 9/10 to the right lower abdomen, excoriated skin site around the ileostomy.  Ileostomy stoma the size of a dime, pink, dark brown drainage in appliance bag.  Skin around the the appliance is warm, red, and painful.  Urostomy to the left lower abdomen, pink stoma, clear, yellow urine in appliance bag. Nephro tube to the lower left side of flank area,clear, yellow drainage in the bag.  Pt. Reported all areas to be the same as when they were placed 6 years ago.  Her board has been updated, vital signs obtained, will continue to monitor.

## 2023-12-08 NOTE — ED PROVIDER NOTES
Encounter Date: 12/7/2023       History     Chief Complaint   Patient presents with    Vomiting     Arrives via EMS with c/o N/V that started 3 days      58-year-old female medical history of DVT, anemia, bilateral ureteral obstructions with left nephrostomy tubes, ostomy bag, history of bilious vomiting with nausea, cervical cancer, stroke mid seizures presents to emergency department with nausea and vomiting and difficulty tolerating p.o..  Patient also endorses abdominal discomfort however she states this has chronic in presentation over the last 60 years due to abdominal procedure she is had in the past.  Patient reports since Monday she was had difficulty tolerating p.o. unable to hold food down.  She now reports she was beginning to feel weak because of this.  She states her bowel movements have been decreased.  Patient reports her has been recently tested positive for COVID. No hematemesis.  No fevers      Review of patient's allergies indicates:   Allergen Reactions    Bee sting [allergen ext-venom-honey bee]      Rash      Grass pollen-bermuda, standard      rash     Past Medical History:   Diagnosis Date    Abnormal mammogram 08/25/2020    Abnormal mammogram 08/25/2020    Acute blood loss anemia     Acute deep vein thrombosis (DVT) of lower extremity 12/09/2020    Advance care planning 04/30/2021    ODESSA (acute kidney injury) 03/21/2020    Anemia due to chronic blood loss     Anemia due to chronic kidney disease     Anemia due to chronic kidney disease 05/18/2020    Anxiety     Bilateral ureteral obstruction 09/11/2020    Bilious vomiting with nausea 06/11/2023    Cardiovascular event risk -- low 09/14/2015    ASCVD 10-year risk 1.9% (with optimal risk factors 1.3%) as of 9/14/2015     Cervical cancer 2014    Chronic back pain     Colostomy care     Deep vein thrombosis     Depression     Diarrhea due to malabsorption 11/14/2018    Difficult intubation     Discharge planning issues 04/30/2021    Disorder of  kidney and ureter     DVT of lower extremity, bilateral 11/04/2020    Edema 04/10/2023    Emphysema of lung 04/10/2023    Fibromyalgia     Fungemia 09/27/2020    Generalized abdominal pain 08/25/2020    GERD (gastroesophageal reflux disease)     Hemifacial spasm 09/16/2015    Hiatal hernia 2014    History of cervical cancer 10/11/2018    History of essential hypertension 05/04/2015    Not requiring medications at this time  BP is low- concern that this may correlate with increased stool output from stoma. Encourage her to contact GI and her PCP.    Hx of psychiatric care     Cyraji trazodone    Hypertension     Hypomagnesemia 11/21/2018    Hyponatremia 09/10/2023    Impaired functional mobility, balance, gait, and endurance 07/24/2015    Lactose intolerance     Major depressive disorder, recurrent episode, moderate 06/02/2023    Metastatic squamous cell carcinoma to lymph node 10/11/2018    Moderate episode of recurrent major depressive disorder 04/21/2021    Nephrostomy complication 10/26/2023    Neuropathy due to chemotherapeutic drug 11/14/2018    Osteoarthritis of back     Peritonitis 09/22/2020    Physical deconditioning 04/30/2021    Pseudomonas urinary tract infection 04/21/2021    Psychiatric problem     Pyelitis 09/09/2023    QT prolongation 04/16/2021    Refusal of blood transfusions as patient is Shinto     Schatzki's ring 09/14/2015    Seen on outside EGD 05/2014, underwent esophageal dilatation. Bx were negative.     Seizure-like activity 12/02/2018    Seizures     Sleep stage dysfunction     Noted on PSG 06/2017; negative for obstructive sleep apnea     Stroke     Urinary tract infection associated with nephrostomy catheter 06/11/2020    Wound infection after surgery 09/24/2020     Past Surgical History:   Procedure Laterality Date    ANTEGRADE NEPHROSTOGRAPHY Bilateral 9/28/2020    Procedure: Nephrostogram - antegrade;  Surgeon: Leslie Balbuena MD;  Location: [facility] OR [address] [place] [address];  Service:  Urology;  Laterality: Bilateral;    ANTEGRADE NEPHROSTOGRAPHY Left 4/20/2021    Procedure: NEPHROSTOGRAM, ANTEGRADE;  Surgeon: Leslie Balbuena MD;  Location: [facility] OR 1ST FLR;  Service: Urology;  Laterality: Left;    ANTEGRADE NEPHROSTOGRAPHY Right 10/27/2022    Procedure: NEPHROSTOGRAM, ANTEGRADE;  Surgeon: Chirag Russ MD;  Location: [facility] OR 1ST FLR;  Service: Urology;  Laterality: Right;    ANTEGRADE NEPHROSTOGRAPHY Right 4/21/2023    Procedure: NEPHROSTOGRAM, ANTEGRADE;  Surgeon: Chirag Russ MD;  Location: [facility] OR 2ND FLR;  Service: Urology;  Laterality: Right;    ANTEGRADE NEPHROSTOGRAPHY Left 6/6/2023    Procedure: Nephrostogram - antegrade;  Surgeon: Leslie Balbuena MD;  Location: [facility] OR 1ST FLR;  Service: Urology;  Laterality: Left;    BILATERAL OOPHORECTOMY  2015    CHOLECYSTECTOMY  11/09/2016    Done at [facility], path showed chronic cholecystitis and gallstones    cold knife conization  2014    COLECTOMY, RIGHT  9/17/2020    Procedure: COLECTOMY, RIGHT Extended;  Surgeon: Hammad Reynolds MD;  Location: [facility] OR 2ND FLR;  Service: Colon and Rectal;;    COLONOSCOPY  2014    [facility] N[facility]    COLONOSCOPY N/A 5/18/2018    Procedure: COLONOSCOPY;  Surgeon: Arden Gutiérrez MD;  Location: [facility] (4TH FLR);  Service: Endoscopy;  Laterality: N/A;    COLONOSCOPY N/A 7/28/2020    Procedure: COLONOSCOPY;  Surgeon: Hammad Reynolds MD;  Location: [facility] ENDO (4TH FLR);  Service: Colon and Rectal;  Laterality: N/A;  covid test [place] 7/25    CYSTOSCOPY WITH URETEROSCOPY, RETROGRADE PYELOGRAPHY, AND INSERTION OF STENT Bilateral 3/21/2020    Procedure: CYSTOSCOPY, WITH RETROGRADE PYELOGRAM,;  Surgeon: Leslie Balbuena MD;  Location: [facility] OR 1ST FLR;  Service: Urology;  Laterality: Bilateral;    DILATION OF NEPHROSTOMY TRACT Right 10/27/2022    Procedure: DILATION, NEPHROSTOMY TRACT;  Surgeon: Chirag Russ MD;  Location: [facility] OR 1ST FLR;  Service: Urology;   Laterality: Right;    ESOPHAGOGASTRODUODENOSCOPY  2014    ESOPHAGOGASTRODUODENOSCOPY  11/18/2020    ESOPHAGOGASTRODUODENOSCOPY N/A 11/18/2020    Procedure: ESOPHAGOGASTRODUODENOSCOPY (EGD);  Surgeon: Zenon Spencer MD;  Location: [facility] ENDO;  Service: Endoscopy;  Laterality: N/A;    ESOPHAGOGASTRODUODENOSCOPY N/A 12/11/2020    Procedure: EGD (ESOPHAGOGASTRODUODENOSCOPY);  Surgeon: Junacho Muse MD;  Location: [facility] (2ND FLR);  Service: Endoscopy;  Laterality: N/A;    HYSTERECTOMY  2014    [facility] for cervical cancer    ILEOSTOMY  9/17/2020    Procedure: CREATION, ILEOSTOMY;  Surgeon: Hammad Reynolds MD;  Location: [facility] OR 2ND FLR;  Service: Colon and Rectal;;    LOOPOGRAM N/A 4/20/2021    Procedure: LOOPOGRAM;  Surgeon: Leslie Balbuena MD;  Location: [facility] OR 1ST FLR;  Service: Urology;  Laterality: N/A;    LOOPOGRAM N/A 6/6/2023    Procedure: LOOPOGRAM;  Surgeon: Leslie Balbuena MD;  Location: [facility] OR 1ST FLR;  Service: Urology;  Laterality: N/A;    MOBILIZATION OF SPLENIC FLEXURE N/A 9/11/2020    Procedure: MOBILIZATION, COLONIC;  Surgeon: Hammad Reynolds MD;  Location: [facility] OR 2ND FLR;  Service: Colon and Rectal;  Laterality: N/A;    NEPHROSTOGRAPHY Bilateral 4/17/2021    Procedure: Nephrostogram;  Surgeon: Celeste Surgeon;  Location: [facility];  Service: Anesthesiology;  Laterality: Bilateral;    OOPHORECTOMY      PERCUTANEOUS NEPHROLITHOTOMY Right 4/21/2023    Procedure: NEPHROLITHOTOMY, PERCUTANEOUS;  Surgeon: Chirag Russ MD;  Location: [facility] OR 2ND FLR;  Service: Urology;  Laterality: Right;  2.5 hrs    PERCUTANEOUS NEPHROSTOMY  4/21/2023    Procedure: CREATION, NEPHROSTOMY, PERCUTANEOUS with removal of existing nephrostomy tube;  Surgeon: Chirag Russ MD;  Location: [facility] OR 2ND FLR;  Service: Urology;;    PYELOSCOPY Right 10/27/2022    Procedure: PYELOSCOPY;  Surgeon: Chirag Russ MD;  Location: [facility] OR [address] [place] [address];  Service: Urology;  Laterality: Right;    REPLACEMENT OF NEPHROSTOMY  TUBE Right 10/27/2022    Procedure: REPLACEMENT, NEPHROSTOMY TUBE;  Surgeon: Chirag uRss MD;  Location: [facility] OR 1ST FLR;  Service: Urology;  Laterality: Right;    RETROPERITONEAL LYMPHADENECTOMY Right 9/17/2018    Procedure: LYMPHADENECTOMY, RETROPERITONEUM;  Surgeon: Sebas Reed MD;  Location: [facility] OR 2ND FLR;  Service: General;  Laterality: Right;  ILIAC    URETEROSCOPIC REMOVAL OF URETERIC CALCULUS Right 10/27/2022    Procedure: REMOVAL, CALCULUS, URETER, URETEROSCOPIC;  Surgeon: Chirag Russ MD;  Location: [facility] OR 1ST FLR;  Service: Urology;  Laterality: Right;    URETEROSCOPY Right 10/27/2022    Procedure: URETEROSCOPY-ANTEGRADE;  Surgeon: Chirag Russ MD;  Location: [facility] OR 1ST FLR;  Service: Urology;  Laterality: Right;     Family History   Problem Relation Age of Onset    Heart disease Sister     Heart disease Maternal Grandmother     Colon cancer Father     Esophageal cancer Father     Cancer Father         Lung-smoker    Cancer Mother         Cervical    Cervical cancer Mother     Breast cancer Maternal Aunt     Suicide Daughter         jumped from parking structure    Drug abuse Daughter     Drug abuse Daughter     Rectal cancer Neg Hx     Stomach cancer Neg Hx     Crohn's disease Neg Hx     Ulcerative colitis Neg Hx     Diabetes Neg Hx     Hypertension Neg Hx      Social History     Tobacco Use    Smoking status: Never    Smokeless tobacco: Never   Substance Use Topics    Alcohol use: No     Alcohol/week: 0.0 standard drinks of alcohol    Drug use: No     Review of Systems  See HPI  Physical Exam     Initial Vitals [12/07/23 1648]   BP Pulse Resp Temp SpO2   110/80 110 18 98 °F (36.7 °C) 96 %      MAP       --         Physical Exam    Vitals reviewed.  Constitutional:   Weak appearing   HENT:   Head: Normocephalic and atraumatic.   Eyes: Conjunctivae and EOM are normal.   Neck:   Normal range of motion.  Cardiovascular:  Normal rate.           Pulmonary/Chest: No respiratory  distress.   Abdominal: She exhibits no distension. There is abdominal tenderness (Epigastric discomfort, chronic in presentation).   Chest pain ostomy bag in place in right side of abdomen, nephrostomy tube on left side There is no rebound.   Musculoskeletal:         General: No edema.      Cervical back: Normal range of motion.      Comments: Moving extremities upper and lower, however will not ambulate independently due to diffuse weakness     Neurological: She is alert and oriented to person, place, and time.   Skin: Skin is warm and dry.   Psychiatric: She has a normal mood and affect. Thought content normal.         ED Course   Procedures  Labs Reviewed   CBC W/ AUTO DIFFERENTIAL - Abnormal; Notable for the following components:       Result Value    WBC 14.24 (*)     RBC 5.52 (*)     RDW 15.2 (*)     Gran # (ANC) 11.0 (*)     Mono # 1.6 (*)     Gran % 76.8 (*)     Lymph % 11.0 (*)     All other components within normal limits   COMPREHENSIVE METABOLIC PANEL - Abnormal; Notable for the following components:    Chloride 116 (*)     CO2 12 (*)     BUN 37 (*)     Creatinine 1.6 (*)     Albumin 3.1 (*)     Alkaline Phosphatase 141 (*)     eGFR 36.7 (*)     All other components within normal limits   ISTAT PROCEDURE - Abnormal; Notable for the following components:    POC Glucose 134 (*)     POC BUN 62 (*)     POC Creatinine 2.2 (*)     POC Potassium 5.9 (*)     POC Chloride 114 (*)     POC TCO2 (MEASURED) 17 (*)     All other components within normal limits   INFLUENZA A & B BY MOLECULAR   CULTURE, BLOOD   CULTURE, BLOOD   SARS-COV-2 RNA AMPLIFICATION, QUAL   LIPASE   URINALYSIS, REFLEX TO URINE CULTURE   ISTAT LACTATE   ISTAT CHEM8          Imaging Results    None          Medications   ondansetron injection 4 mg (4 mg Intravenous Given 12/7/23 1916)   sodium chloride 0.9% bolus 1,000 mL 1,000 mL (1,000 mLs Intravenous New Bag 12/7/23 1913)   morphine injection 4 mg (4 mg Intravenous Given 12/7/23 1916)      Medical Decision Making  58-year-old female presents emergency department with nausea and vomiting, has nephrostomy tube and ostomy bag in place.    Differential diagnosis viral syndrome, pancreatitis, electrolyte derangement secondary to dehydration due to decreased p.o. intake, also considered infection.  Labwork performed revealing mild leukocytosis could be reactive secondary to vomiting, patient also has a new ODESSA with a creatinine of 2.2, UA from nephrostomy bag still pending at this time.  Patient was not present with fevers although she was tachycardic.  Lactic normal no concern of sepsis at this time.  Discussed patient presentation with hospital medicine due to new onset ODESSA source possible dehydration versus infection.  Admitted to their service for continued management.  Pt discussed with supervising physician      Amount and/or Complexity of Data Reviewed  Labs: ordered. Decision-making details documented in ED Course.    Risk  Prescription drug management.               ED Course as of 12/07/23 2203   Thu Dec 07, 2023   2044 POC Lactate: 1.19 [CR]   2044 ODESSA likely secondary to dehydration secondary to nausea vomiting and decreased intake versus infection but UA still pending [CR]      ED Course User Index  [CR] Ghada Nix PA-C                         Clinical Impression:  Final diagnoses:  [R00.0] Tachycardia  [R53.1] Weakness  [N17.9] ODESSA (acute kidney injury) (Primary)          ED Disposition Condition    Observation Stable                Ghada Nix PA-C  12/07/23 2203       Ghada Nix PA-C  12/07/23 2204

## 2023-12-08 NOTE — HPI
57 y/o AAF hx of Cervical Cancer s/p XRT & Bilateral Ureteral strictures with obstructive uropathy now s/p transverse colon conduit, s/p Left Nephrostomy tube, s/p Ileostomy, hx of MDR UTI/pyelonephritis presents to the ED with complaints of abdominal pain.  She reports since Monday 12/4, she has had recurrent nausea vomiting over 3 days, poor diet/oral intake with associated weakness.     2 recent hospitalizations with left sided pyelonephritis, prior urine cultures show different MDR bacteria that have grown.  She is pending evaluation by colo-rectal surgery for outpatient revision of urostomy, follows with Dr. Balbuena (urology)     Reports having epigastric pain 10/10 on arrival,  9/10 during interview, only mild relief with initial morphine dosage.   She does not note any change in the ileostomy stool output, no reported change in amount or quality from her urostomy or her left nephrostomy tube. She denies any fevers of chills.     ED Treatment: Morphine 4mg IV,  Zofran 4mg IV, NS 1Liter

## 2023-12-08 NOTE — PLAN OF CARE
[facility] - Observation [facility]  Initial Discharge Assessment       Primary Care Provider: Trina Vu MD    Admission Diagnosis: Weakness [R53.1]  Tachycardia [R00.0]  ODESSA (acute kidney injury) [N17.9]  Chest pain [R07.9]    Admission Date: 12/7/2023  Expected Discharge Date: 12/9/2023    Transition of Care Barriers: (P) None    Payor: MEDICAID / Plan: [facility] ([facility]) / Product Type: Managed Medicaid /     Extended Emergency Contact Information  Primary Emergency Contact: Hammad Riley  Address: [address] [place] [address] [facility]  Home Phone: [phone]  Work Phone: [phone]  Mobile Phone: [phone]  Relation: Spouse  Secondary Emergency Contact: RajandeliciaFederico  Mobile Phone: [phone]  Relation: Daughter    Discharge Plan A: (P) Home, Home with family  Discharge Plan B: (P) Home, Home with family      [id number] - [place] - [address]  [address]  [place] [address]  Phone: [phone] Fax: [phone]    [facility]  [address]  [facility] [address]  Phone: [phone] Fax: [phone]      Initial Assessment (most recent)       Adult Discharge Assessment - 12/08/23 0947          Discharge Assessment    Assessment Type Discharge Planning Assessment (P)      Confirmed/corrected address, phone number and insurance Yes (P)      Confirmed Demographics Correct on Facesheet (P)      Source of Information patient (P)      When was your last doctors appointment? -- (P)    Sept 2023    Does patient/caregiver understand observation status Yes (P)      Communicated OK with patient/caregiver Date not available/Unable to determine (P)      People in Home spouse (P)      Facility Arrived From: home (P)      Do you expect to return to your current living situation? Yes (P)      Do you have help at home or someone to help you manage your care at home? Yes (P)      Who are  your caregiver(s) and their phone number(s)? Hammad Riley-  [phone] (P)      Prior to hospitilization cognitive status: Alert/Oriented (P)      Current cognitive status: Alert/Oriented (P)      Walking or Climbing Stairs ambulation difficulty, requires equipment (P)      Mobility Management rolling walker (P)      Dressing/Bathing Difficulty yes (P)      Dressing/Bathing bathing difficulty, assistance 1 person (P)      Home Layout Able to live on 1st floor (P)      Equipment Currently Used at Home walker, rolling;wheelchair;colostomy/ostomy supplies;other (see comments) (P)    nephro tube    Readmission within 30 days? No (P)      Patient currently being followed by outpatient case management? No (P)      Do you currently have service(s) that help you manage your care at home? No (P)      Do you take prescription medications? Yes (P)      Do you have prescription coverage? Yes (P)      Coverage Medicaid (P)      Do you have any problems affording any of your prescribed medications? No (P)      Is the patient taking medications as prescribed? yes (P)      Who is going to help you get home at discharge?  - Hammad (P)      How do you get to doctors appointments? family or friend will provide (P)      Are you on dialysis? No (P)      Do you take coumadin? No (P)      Discharge Plan A Home;Home with family (P)      Discharge Plan B Home;Home with family (P)      DME Needed Upon Discharge  none (P)      Discharge Plan discussed with: Patient (P)      Transition of Care Barriers None (P)         Financial Resource Strain    How hard is it for you to pay for the very basics like food, housing, medical care, and heating? Not hard at all (P)         Housing Stability    In the last 12 months, was there a time when you were not able to pay the mortgage or rent on time? No (P)      In the last 12 months, was there a time when you did not have a steady place to sleep or slept in a shelter (including  now)? No (P)         Transportation Needs    In the past 12 months, has lack of transportation kept you from medical appointments or from getting medications? No (P)      In the past 12 months, has lack of transportation kept you from meetings, work, or from getting things needed for daily living? No (P)         Food Insecurity    Within the past 12 months, you worried that your food would run out before you got the money to buy more. Never true (P)      Within the past 12 months, the food you bought just didn't last and you didn't have money to get more. Never true (P)         Social Connections    Are you , , , , never , or living with a partner?  (P)                       Patient states that her  cares for her. She denies services in the home. Patient states that she has had ileostomy and nephro tube for 6 years. Patient states that last month the nephro tubed were going to be removed but the surgery was pushed back. Patient states she does not know why. She plan to go home at time of d/c. Her  will provide transportation when medically ready for d/c.     Patient lives in a single level home  with 4 steps to enter the home .     Discharge Plan A and Plan B have been determined by review of patient's clinical status, future medical and therapeutic needs, and coverage/benefits for post-acute care in coordination with multidisciplinary team members.  Venessa Boyle RN

## 2023-12-08 NOTE — ASSESSMENT & PLAN NOTE
Creatine stable for now. BMP reviewed- noted Estimated Creatinine Clearance: 43 mL/min (A) (based on SCr of 1.6 mg/dL (H)). according to latest data. Based on current GFR, CKD stage is stage 3 - GFR 30-59.  Monitor UOP and serial BMP and adjust therapy as needed. Renally dose meds. Avoid nephrotoxic medications and procedures.

## 2023-12-08 NOTE — ASSESSMENT & PLAN NOTE
-patient with hx of MDRO and VRE and recent bouts pyelonephritis, with similar symptoms, epigastric tenderness, leukocytosis, pyuria  -ID Consulted. Continue cefepime.  -Urine cx growing GNR  -Blood cx with NGTD  -Obtain CT A/P w/o contrast     -PRN  analgesics

## 2023-12-08 NOTE — SUBJECTIVE & OBJECTIVE
Interval History: Patient reports L flank pain and tenderness. She has hx of MDRO and  VRE UTI. Consulted ID. Continuing cefepime for now pending ID eval. Urine cx pending. Urostomy bags draining well . Cr 1.9 from baseline 1.3.   Objective:     Vital Signs (Most Recent):  Temp: 97.7 °F (36.5 °C) (12/08/23 0752)  Pulse: 89 (12/08/23 1125)  Resp: 18 (12/08/23 0752)  BP: (!) 108/56 (12/08/23 0752)  SpO2: 100 % (12/08/23 0752) Vital Signs (24h Range):  Temp:  [97.7 °F (36.5 °C)-98.4 °F (36.9 °C)] 97.7 °F (36.5 °C)  Pulse:  [] 89  Resp:  [17-18] 18  SpO2:  [96 %-100 %] 100 %  BP: (102-110)/(56-80) 108/56     Weight: 83.2 kg (183 lb 6.8 oz)  Body mass index is 28.73 kg/m².    Intake/Output Summary (Last 24 hours) at 12/8/2023 1228  Last data filed at 12/7/2023 2013  Gross per 24 hour   Intake 999 ml   Output --   Net 999 ml           Physical Exam  Vitals and nursing note reviewed.   Constitutional:       Appearance: She is ill-appearing.   Eyes:      General: No scleral icterus.     Pupils: Pupils are equal, round, and reactive to light.   Cardiovascular:      Rate and Rhythm: Normal rate and regular rhythm.      Pulses: Normal pulses.   Pulmonary:      Effort: Pulmonary effort is normal. No respiratory distress.      Breath sounds: No wheezing.   Abdominal:      Tenderness: There is abdominal tenderness (epigastric).      Comments: RLQ Ileostomy with brown liquid stool output, surrounding abdominal wall near ileostomy with erythema/hyperpigmented appearance.     LUQ urostomy  Left Flank nephrostomy tube   Skin:     General: Skin is warm and dry.   Neurological:      General: No focal deficit present.      Mental Status: She is alert.

## 2023-12-08 NOTE — ASSESSMENT & PLAN NOTE
Patient with ileostomy, patient reporting increased output recently  -Continue home oral sodium bicarbonate   -If Serum Bicarb is downtrending- she may require bicarbonate infusion    Trend Lactic acid

## 2023-12-08 NOTE — SUBJECTIVE & OBJECTIVE
Past Medical History:   Diagnosis Date    Abnormal mammogram 08/25/2020    Abnormal mammogram 08/25/2020    Acute blood loss anemia     Acute deep vein thrombosis (DVT) of lower extremity 12/09/2020    Advance care planning 04/30/2021    ODESSA (acute kidney injury) 03/21/2020    Anemia due to chronic blood loss     Anemia due to chronic kidney disease     Anemia due to chronic kidney disease 05/18/2020    Anxiety     Bilateral ureteral obstruction 09/11/2020    Bilious vomiting with nausea 06/11/2023    Cardiovascular event risk -- low 09/14/2015    ASCVD 10-year risk 1.9% (with optimal risk factors 1.3%) as of 9/14/2015     Cervical cancer 2014    Chronic back pain     Colostomy care     Deep vein thrombosis     Depression     Diarrhea due to malabsorption 11/14/2018    Difficult intubation     Discharge planning issues 04/30/2021    Disorder of kidney and ureter     DVT of lower extremity, bilateral 11/04/2020    Edema 04/10/2023    Emphysema of lung 04/10/2023    Fibromyalgia     Fungemia 09/27/2020    Generalized abdominal pain 08/25/2020    GERD (gastroesophageal reflux disease)     Hemifacial spasm 09/16/2015    Hiatal hernia 2014    History of cervical cancer 10/11/2018    History of essential hypertension 05/04/2015    Not requiring medications at this time  BP is low- concern that this may correlate with increased stool output from stoma. Encourage her to contact GI and her PCP.    Hx of psychiatric care     Cymbalta, trazodone    Hypertension     Hypomagnesemia 11/21/2018    Hyponatremia 09/10/2023    Impaired functional mobility, balance, gait, and endurance 07/24/2015    Lactose intolerance     Major depressive disorder, recurrent episode, moderate 06/02/2023    Metastatic squamous cell carcinoma to lymph node 10/11/2018    Moderate episode of recurrent major depressive disorder 04/21/2021    Nephrostomy complication 10/26/2023    Neuropathy due to chemotherapeutic drug 11/14/2018    Osteoarthritis of back      Peritonitis 09/22/2020    Physical deconditioning 04/30/2021    Pseudomonas urinary tract infection 04/21/2021    Psychiatric problem     Pyelitis 09/09/2023    QT prolongation 04/16/2021    Refusal of blood transfusions as patient is Nondenominational     Schatzki's ring 09/14/2015    Seen on outside EGD 05/2014, underwent esophageal dilatation. Bx were negative.     Seizure-like activity 12/02/2018    Seizures     Sleep stage dysfunction     Noted on PSG 06/2017; negative for obstructive sleep apnea     Stroke     Urinary tract infection associated with nephrostomy catheter 06/11/2020    Wound infection after surgery 09/24/2020       Past Surgical History:   Procedure Laterality Date    ANTEGRADE NEPHROSTOGRAPHY Bilateral 9/28/2020    Procedure: Nephrostogram - antegrade;  Surgeon: Leslie Balbuena MD;  [facility];  Service: Urology;  Laterality: Bilateral;    ANTEGRADE NEPHROSTOGRAPHY Left 4/20/2021    Procedure: NEPHROSTOGRAM, ANTEGRADE;  Surgeon: Leslie Balbuena MD;  [facility];  Service: Urology;  Laterality: Left;    ANTEGRADE NEPHROSTOGRAPHY Right 10/27/2022    Procedure: NEPHROSTOGRAM, ANTEGRADE;  Surgeon: Chirag Russ MD;  [facility];  Service: Urology;  Laterality: Right;    ANTEGRADE NEPHROSTOGRAPHY Right 4/21/2023    Procedure: NEPHROSTOGRAM, ANTEGRADE;  Surgeon: Chirag Russ MD;  Location: [facility] OR 2ND FLR;  Service: Urology;  Laterality: Right;    ANTEGRADE NEPHROSTOGRAPHY Left 6/6/2023    Procedure: Nephrostogram - antegrade;  Surgeon: Leslie Balbuena MD;  [facility];  Service: Urology;  Laterality: Left;    BILATERAL OOPHORECTOMY  2015    CHOLECYSTECTOMY  11/09/2016    Done at [facility], path showed chronic cholecystitis and gallstones    cold knife conization  2014    COLECTOMY, RIGHT  9/17/2020    Procedure: COLECTOMY, RIGHT Extended;  Surgeon: Hammad Reynolds MD;  Location: [facility] OR 2ND FLR;  Service: Colon and Rectal;;     COLONOSCOPY  2014    COLONOSCOPY N/A 10/24/2016    at [facility], Dr Gutiérrez    COLONOSCOPY N/A 5/18/2018    Procedure: COLONOSCOPY;  Surgeon: Arden Gutiérrez MD;  Location: [facility] (4TH FLR);  Service: Endoscopy;  Laterality: N/A;    COLONOSCOPY N/A 7/28/2020    Procedure: COLONOSCOPY;  Surgeon: Hammad Reynolds MD;  Location: [facility] (4TH FLR);  Service: Colon and Rectal;  Laterality: N/A;  covid test [place] 7/25    CYSTOSCOPY WITH URETEROSCOPY, RETROGRADE PYELOGRAPHY, AND INSERTION OF STENT Bilateral 3/21/2020    Procedure: CYSTOSCOPY, WITH RETROGRADE PYELOGRAM,;  Surgeon: Leslie Balbuena MD;  Location: [facility] OR 1ST FLR;  Service: Urology;  Laterality: Bilateral;    DILATION OF NEPHROSTOMY TRACT Right 10/27/2022    Procedure: DILATION, NEPHROSTOMY TRACT;  Surgeon: Chirag Russ MD;  Location: [facility] OR 1ST FLR;  Service: Urology;  Laterality: Right;    ESOPHAGOGASTRODUODENOSCOPY  2014    ESOPHAGOGASTRODUODENOSCOPY  11/18/2020    ESOPHAGOGASTRODUODENOSCOPY N/A 11/18/2020    Procedure: ESOPHAGOGASTRODUODENOSCOPY (EGD);  Surgeon: Zenon Spencer MD;  Location: [facility];  Service: Endoscopy;  Laterality: N/A;    ESOPHAGOGASTRODUODENOSCOPY N/A 12/11/2020    Procedure: EGD (ESOPHAGOGASTRODUODENOSCOPY);  Surgeon: Juancho Muse MD;  Location: [facility] (2ND FLR);  Service: Endoscopy;  Laterality: N/A;    HYSTERECTOMY  2014    [facility] for cervical cancer    ILEOSTOMY  9/17/2020    Procedure: CREATION, ILEOSTOMY;  Surgeon: Hammad Reynolds MD;  Location: [facility] OR 2ND FLR;  Service: Colon and Rectal;;    LOOPOGRAM N/A 4/20/2021    Procedure: LOOPOGRAM;  Surgeon: Leslie Balbuena MD;  Location: [facility] OR 1ST FLR;  Service: Urology;  Laterality: N/A;    LOOPOGRAM N/A 6/6/2023    Procedure: LOOPOGRAM;  Surgeon: Leslie Balbuena MD;  Location: [facility] OR 1ST FLR;  Service: Urology;  Laterality: N/A;    MOBILIZATION OF SPLENIC FLEXURE N/A 9/11/2020    Procedure: MOBILIZATION, COLONIC;  Surgeon: Hammad Reynolds MD;   Location: [facility] OR 2ND FLR;  Service: Colon and Rectal;  Laterality: N/A;    NEPHROSTOGRAPHY Bilateral 4/17/2021    Procedure: Nephrostogram;  Surgeon: Celeste Surgeon;  Location: [facility];  Service: Anesthesiology;  Laterality: Bilateral;    OOPHORECTOMY      PERCUTANEOUS NEPHROLITHOTOMY Right 4/21/2023    Procedure: NEPHROLITHOTOMY, PERCUTANEOUS;  Surgeon: Chirag Russ MD;  Location: [facility] OR 2ND FLR;  Service: Urology;  Laterality: Right;  2.5 hrs    PERCUTANEOUS NEPHROSTOMY  4/21/2023    Procedure: CREATION, NEPHROSTOMY, PERCUTANEOUS with removal of existing nephrostomy tube;  Surgeon: Chirag Russ MD;  Location: [facility] OR [facility] FLR;  Service: Urology;;    PYELOSCOPY Right 10/27/2022    Procedure: PYELOSCOPY;  Surgeon: Chirag Russ MD;  [facility];  Service: Urology;  Laterality: Right;    REPLACEMENT OF NEPHROSTOMY TUBE Right 10/27/2022    Procedure: REPLACEMENT, NEPHROSTOMY TUBE;  Surgeon: Chirag Russ MD;  [facility];  Service: Urology;  Laterality: Right;    RETROPERITONEAL LYMPHADENECTOMY Right 9/17/2018    Procedure: LYMPHADENECTOMY, RETROPERITONEUM;  Surgeon: Sebas Reed MD;  [facility];  Service: General;  Laterality: Right;  ILIAC    URETEROSCOPIC REMOVAL OF URETERIC CALCULUS Right 10/27/2022    Procedure: REMOVAL, CALCULUS, URETER, URETEROSCOPIC;  Surgeon: Chirag Russ MD;  [facility];  Service: Urology;  Laterality: Right;    URETEROSCOPY Right 10/27/2022    Procedure: URETEROSCOPY-ANTEGRADE;  Surgeon: Chirag Russ MD;  [facility];  Service: Urology;  Laterality: Right;       Review of patient's allergies indicates:   Allergen Reactions    Bee sting [allergen ext-venom-honey bee]      Rash      Grass pollen-bermuda, standard      rash       No current facility-administered medications on file prior to encounter.     Current Outpatient Medications on File Prior to Encounter   Medication Sig     loratadine (CLARITIN) 10 mg tablet Take 10 mg by mouth daily as needed for Allergies.    mirtazapine (REMERON) 30 MG tablet Take 1 tablet (30 mg total) by mouth nightly.    ondansetron (ZOFRAN-ODT) 4 MG TbDL Dissolve 1 tablet (4 mg total) by mouth every 8 (eight) hours as needed (nausea).    sodium bicarbonate 650 MG tablet as directed Orally     Family History       Problem Relation (Age of Onset)    Breast cancer Maternal Aunt    Cancer Father, Mother    Cervical cancer Mother    Colon cancer Father    Drug abuse Daughter, Daughter    Esophageal cancer Father    Heart disease Sister, Maternal Grandmother    Suicide Daughter          Tobacco Use    Smoking status: Never    Smokeless tobacco: Never   Substance and Sexual Activity    Alcohol use: No     Alcohol/week: 0.0 standard drinks of alcohol    Drug use: No    Sexual activity: Yes     Partners: Male     Birth control/protection: None     Comment:  19 years since 1999     Review of Systems   Constitutional:  Positive for activity change, appetite change and fatigue. Negative for chills and fever.   Respiratory:  Negative for cough and shortness of breath.    Cardiovascular:  Negative for chest pain, palpitations and leg swelling.   Skin:  Negative for rash.   Neurological:  Positive for weakness.   Objective:     Vital Signs (Most Recent):  Temp: 97.8 °F (36.6 °C) (12/07/23 2238)  Pulse: 86 (12/07/23 2238)  Resp: 18 (12/07/23 2238)  BP: (!) 104/58 (12/07/23 2238)  SpO2: 99 % (12/07/23 2238) Vital Signs (24h Range):  Temp:  [97.8 °F (36.6 °C)-98 °F (36.7 °C)] 97.8 °F (36.6 °C)  Pulse:  [] 86  Resp:  [18] 18  SpO2:  [96 %-99 %] 99 %  BP: (104-110)/(58-80) 104/58     Weight: 82.6 kg (182 lb)  Body mass index is 26.88 kg/m².     Physical Exam  Vitals and nursing note reviewed.   Constitutional:       Appearance: She is ill-appearing.   Eyes:      General: No scleral icterus.     Pupils: Pupils are equal, round, and reactive to light.   Cardiovascular:  "     Rate and Rhythm: Normal rate and regular rhythm.      Pulses: Normal pulses.   Pulmonary:      Effort: Pulmonary effort is normal. No respiratory distress.      Breath sounds: No wheezing.   Abdominal:      Tenderness: There is abdominal tenderness (epigastric).      Comments: RLQ Ileostomy with brown liquid stool output, surrounding abdominal wall near ileostomy with erythema/hyperpigmented appearance.     LUQ urostomy  Left Flank nephrostomy tube   Skin:     General: Skin is warm and dry.   Neurological:      General: No focal deficit present.      Mental Status: She is alert.          CRANIAL NERVES     CN III, IV, VI   Pupils are equal, round, and reactive to light.     Significant Labs: All pertinent labs within the past 24 hours have been reviewed.  CBC:   Recent Labs   Lab 12/07/23  1835 12/07/23 1839   WBC 14.24*  --    HGB 15.3  --    HCT 46.9 50     --      CMP:   Recent Labs   Lab 12/07/23 2027      K 4.4   *   CO2 12*      BUN 37*   CREATININE 1.6*   CALCIUM 8.7   PROT 8.0   ALBUMIN 3.1*   BILITOT 0.5   ALKPHOS 141*   AST 33   ALT 35   ANIONGAP 9     Cardiac Markers: No results for input(s): "CKMB", "MYOGLOBIN", "BNP", "TROPISTAT" in the last 48 hours.  Coagulation: No results for input(s): "PT", "INR", "APTT" in the last 48 hours.  Lactic Acid: No results for input(s): "LACTATE" in the last 48 hours.  Urine Studies:   Recent Labs   Lab 12/07/23 2131   COLORU [place]   APPEARANCEUA Cloudy*   PHUR 7.0   SPECGRAV 1.015   PROTEINUA 2+*   GLUCUA Negative   KETONESU Negative   BILIRUBINUA Negative   OCCULTUA 1+*   NITRITE Negative   LEUKOCYTESUR 3+*   RBCUA 43*   WBCUA >100*   BACTERIA Many*   SQUAMEPITHEL 4   HYALINECASTS 0       Significant Imaging: I have reviewed all pertinent imaging results/findings within the past 24 hours.      "

## 2023-12-08 NOTE — PLAN OF CARE
Problem: Adult Inpatient Plan of Care  Goal: Plan of Care Review  Outcome: Ongoing, Progressing  Goal: Patient-Specific Goal (Individualized)  Outcome: Ongoing, Progressing  Goal: Absence of Hospital-Acquired Illness or Injury  Outcome: Ongoing, Progressing  Goal: Optimal Comfort and Wellbeing  Outcome: Ongoing, Progressing  Goal: Readiness for Transition of Care  Outcome: Ongoing, Progressing     Problem: Infection  Goal: Absence of Infection Signs and Symptoms  Outcome: Ongoing, Progressing     Problem: Impaired Wound Healing  Goal: Optimal Wound Healing  Outcome: Ongoing, Progressing     Problem: Skin Injury Risk Increased  Goal: Skin Health and Integrity  Outcome: Ongoing, Progressing     Problem: Fall Injury Risk  Goal: Absence of Fall and Fall-Related Injury  Outcome: Ongoing, Progressing     Problem: Pain Acute  Goal: Acceptable Pain Control and Functional Ability  Outcome: Ongoing, Progressing     Problem: Fatigue  Goal: Improved Activity Tolerance  Outcome: Ongoing, Progressing     Problem: Adjustment to Illness (Chronic Kidney Disease)  Goal: Optimal Coping with Chronic Illness  Outcome: Ongoing, Progressing     Problem: Electrolyte Imbalance (Chronic Kidney Disease)  Goal: Electrolyte Balance  Outcome: Ongoing, Progressing     Problem: Fluid Volume Excess (Chronic Kidney Disease)  Goal: Fluid Balance  Outcome: Ongoing, Progressing     Problem: Functional Decline (Chronic Kidney Disease)  Goal: Optimal Functional Ability  Outcome: Ongoing, Progressing     Problem: Hematologic Alteration (Chronic Kidney Disease)  Goal: Absence of Anemia Signs and Symptoms  Outcome: Ongoing, Progressing     Problem: Oral Intake Inadequate (Chronic Kidney Disease)  Goal: Optimal Oral Intake  Outcome: Ongoing, Progressing     Problem: Pain (Chronic Kidney Disease)  Goal: Acceptable Pain Control  Outcome: Ongoing, Progressing     Problem: Renal Function Impairment (Chronic Kidney Disease)  Goal: Minimize Renal Failure  Effects  Outcome: Ongoing, Progressing     Problem: Hypertension Acute  Goal: Blood Pressure Within Desired Range  Outcome: Ongoing, Progressing     Problem: UTI (Urinary Tract Infection)  Goal: Improved Infection Symptoms  Outcome: Ongoing, Progressing     Problem: Pain Chronic (Persistent) (Comorbidity Management)  Goal: Acceptable Pain Control and Functional Ability  Outcome: Ongoing, Progressing     Problem: Fluid and Electrolyte Imbalance (Acute Kidney Injury/Impairment)  Goal: Fluid and Electrolyte Balance  Outcome: Ongoing, Progressing     Problem: Oral Intake Inadequate (Acute Kidney Injury/Impairment)  Goal: Optimal Nutrition Intake  Outcome: Ongoing, Progressing     Problem: Renal Function Impairment (Acute Kidney Injury/Impairment)  Goal: Effective Renal Function  Outcome: Ongoing, Progressing     Problem: Activity Intolerance  Goal: Enhanced Capacity and Energy  Outcome: Ongoing, Progressing     Problem: Adjustment to Surgery (Colostomy)  Goal: Optimal Coping  Outcome: Ongoing, Progressing     Problem: Bleeding (Colostomy)  Goal: Absence of Bleeding  Outcome: Ongoing, Progressing     Problem: Fluid, Electrolyte and Nutrition Imbalance (Colostomy)  Goal: Fluid, Electrolyte and Nutrition Balance  Outcome: Ongoing, Progressing     Problem: Infection (Colostomy)  Goal: Absence of Infection Signs and Symptoms  Outcome: Ongoing, Progressing     Problem: Ongoing Anesthesia Effects (Colostomy)  Goal: Anesthesia/Sedation Recovery  Outcome: Ongoing, Progressing     Problem: Pain (Colostomy)  Goal: Acceptable Pain Control  Outcome: Ongoing, Progressing     Problem: Postoperative Nausea and Vomiting (Colostomy)  Goal: Nausea and Vomiting Relief  Outcome: Ongoing, Progressing     Problem: Postoperative Stoma Care (Colostomy)  Goal: Optimal Stoma Healing  Outcome: Ongoing, Progressing     Problem: Postoperative Urinary Retention (Colostomy)  Goal: Effective Urinary Elimination  Outcome: Ongoing, Progressing      Problem: Respiratory Compromise (Colostomy)  Goal: Effective Oxygenation and Ventilation  Outcome: Ongoing, Progressing     Problem: Adjustment to Surgery (Ileostomy)  Goal: Optimal Coping  Outcome: Ongoing, Progressing     Problem: Bleeding (Ileostomy)  Goal: Absence of Bleeding  Outcome: Ongoing, Progressing     Problem: Fluid, Electrolyte and Nutrition Imbalance (Ileostomy)  Goal: Fluid, Electrolyte and Nutrition Balance  Outcome: Ongoing, Progressing     Problem: Infection (Ileostomy)  Goal: Absence of Infection Signs and Symptoms  Outcome: Ongoing, Progressing

## 2023-12-08 NOTE — NURSING
Nurses Note -- 4 Eyes      12/8/2023   4:43 AM      Skin assessed during: Admit      [] No Altered Skin Integrity Present    []Prevention Measures Documented      [x] Yes- Altered Skin Integrity Present or Discovered   [x] LDA Added if Not in Epic (Describe Wound)   [x] New Altered Skin Integrity was Present on Admit and Documented in LDA   [] Wound Image Taken    Wound Care Consulted? Yes    Attending Nurse:  Willian Lovell RN/Staff Member:   Kathleen PERAZA

## 2023-12-08 NOTE — H&P
[facility] [place] - Emergency Dept  [facility] Medicine  History & Physical    Patient Name: Edita Riley  MRN: [id number]  Patient Class: OP- Observation  Admission Date: 12/7/2023  Attending Physician: Jamar Campbell MD [facility]   Admitting Physician: Nathan Vu MD  Primary Care Provider: Trina Vu MD       Patient information was obtained from patient, past medical records, and ER records.     Subjective:     Principal Problem:Pyelonephritis    Chief Complaint:   Chief Complaint   Patient presents with    Vomiting     Arrives via EMS with c/o N/V that started 3 days         HPI: 57 y/o AAF hx of Cervical Cancer s/p XRT & Bilateral Ureteral strictures with obstructive uropathy now s/p transverse colon conduit, s/p Left Nephrostomy tube, s/p Ileostomy, hx of MDR UTI/pyelonephritis presents to the ED with complaints of abdominal pain.  She reports since Monday 12/4, she has had recurrent nausea vomiting over 3 days, poor diet/oral intake with associated weakness.     2 recent hospitalizations with left sided pyelonephritis, prior urine cultures show different MDR bacteria that have grown.  She is pending evaluation by colo-rectal surgery for outpatient revision of urostomy, follows with Dr. Balbuena (urology)     Reports having epigastric pain 10/10 on arrival,  9/10 during interview, only mild relief with initial morphine dosage.   She does not note any change in the ileostomy stool output, no reported change in amount or quality from her urostomy or her left nephrostomy tube. She denies any fevers of chills.     ED Treatment: Morphine 4mg IV,  Zofran 4mg IV, NS 1Liter    Past Medical History:   Diagnosis Date    Abnormal mammogram 08/25/2020    Abnormal mammogram 08/25/2020    Acute blood loss anemia     Acute deep vein thrombosis (DVT) of lower extremity 12/09/2020    Advance care planning 04/30/2021    ODESSA (acute kidney injury) 03/21/2020    Anemia due to chronic blood loss     Anemia due  to chronic kidney disease     Anemia due to chronic kidney disease 05/18/2020    Anxiety     Bilateral ureteral obstruction 09/11/2020    Bilious vomiting with nausea 06/11/2023    Cardiovascular event risk -- low 09/14/2015    ASCVD 10-year risk 1.9% (with optimal risk factors 1.3%) as of 9/14/2015     Cervical cancer 2014    Chronic back pain     Colostomy care     Deep vein thrombosis     Depression     Diarrhea due to malabsorption 11/14/2018    Difficult intubation     Discharge planning issues 04/30/2021    Disorder of kidney and ureter     DVT of lower extremity, bilateral 11/04/2020    Edema 04/10/2023    Emphysema of lung 04/10/2023    Fibromyalgia     Fungemia 09/27/2020    Generalized abdominal pain 08/25/2020    GERD (gastroesophageal reflux disease)     Hemifacial spasm 09/16/2015    Hiatal hernia 2014    History of cervical cancer 10/11/2018    History of essential hypertension 05/04/2015    Not requiring medications at this time  BP is low- concern that this may correlate with increased stool output from stoma. Encourage her to contact GI and her PCP.    Hx of psychiatric care     Cymbalta, trazodone    Hypertension     Hypomagnesemia 11/21/2018    Hyponatremia 09/10/2023    Impaired functional mobility, balance, gait, and endurance 07/24/2015    Lactose intolerance     Major depressive disorder, recurrent episode, moderate 06/02/2023    Metastatic squamous cell carcinoma to lymph node 10/11/2018    Moderate episode of recurrent major depressive disorder 04/21/2021    Nephrostomy complication 10/26/2023    Neuropathy due to chemotherapeutic drug 11/14/2018    Osteoarthritis of back     Peritonitis 09/22/2020    Physical deconditioning 04/30/2021    Pseudomonas urinary tract infection 04/21/2021    Psychiatric problem     Pyelitis 09/09/2023    QT prolongation 04/16/2021    Refusal of blood transfusions as patient is Muslim     Ranjit's annette 09/14/2015    Seen on outside EGD 05/2014,  underwent esophageal dilatation. Bx were negative.     Seizure-like activity 12/02/2018    Seizures     Sleep stage dysfunction     Noted on PSG 06/2017; negative for obstructive sleep apnea     Stroke     Urinary tract infection associated with nephrostomy catheter 06/11/2020    Wound infection after surgery 09/24/2020       Past Surgical History:   Procedure Laterality Date    ANTEGRADE NEPHROSTOGRAPHY Bilateral 9/28/2020    Procedure: Nephrostogram - antegrade;  Surgeon: Leslie Balbuena MD;  Location: [facility] OR 1ST FLR;  Service: Urology;  Laterality: Bilateral;    ANTEGRADE NEPHROSTOGRAPHY Left 4/20/2021    Procedure: NEPHROSTOGRAM, ANTEGRADE;  Surgeon: Leslie Balbuena MD;  Location: [facility] OR 1ST FLR;  Service: Urology;  Laterality: Left;    ANTEGRADE NEPHROSTOGRAPHY Right 10/27/2022    Procedure: NEPHROSTOGRAM, ANTEGRADE;  Surgeon: Chirag Russ MD;  Location: [facility] OR 1ST FLR;  Service: Urology;  Laterality: Right;    ANTEGRADE NEPHROSTOGRAPHY Right 4/21/2023    Procedure: NEPHROSTOGRAM, ANTEGRADE;  Surgeon: Chirag Russ MD;  Location: [facility] OR 2ND FLR;  Service: Urology;  Laterality: Right;    ANTEGRADE NEPHROSTOGRAPHY Left 6/6/2023    Procedure: Nephrostogram - antegrade;  Surgeon: Leslie Balbuena MD;  Location: [facility] OR 1ST FLR;  Service: Urology;  Laterality: Left;    BILATERAL OOPHORECTOMY  2015    CHOLECYSTECTOMY  11/09/2016    Done at [facility], path showed chronic cholecystitis and gallstones    cold knife conization  2014    COLECTOMY, RIGHT  9/17/2020    Procedure: COLECTOMY, RIGHT Extended;  Surgeon: Hammad Reynolds MD;  Location: [facility] OR 2ND FLR;  Service: Colon and Rectal;;    COLONOSCOPY  2014    [facility]    COLONOSCOPY N/A 5/18/2018    Procedure: COLONOSCOPY;  Surgeon: Arden Gutiérrez MD;  Location: [facility] (4TH FLR);  Service: Endoscopy;  Laterality: N/A;    COLONOSCOPY N/A 7/28/2020    Procedure: COLONOSCOPY;  Surgeon: Hammad Reynolds MD;   Location: [facility] ENDO (4TH FLR);  Service: Colon and Rectal;  Laterality: N/A;  covid test [place] 7/25    CYSTOSCOPY WITH URETEROSCOPY, RETROGRADE PYELOGRAPHY, AND INSERTION OF STENT Bilateral 3/21/2020    Procedure: CYSTOSCOPY, WITH RETROGRADE PYELOGRAM,;  Surgeon: Leslie Balbuena MD;  Location: [facility] OR 1ST FLR;  Service: Urology;  Laterality: Bilateral;    DILATION OF NEPHROSTOMY TRACT Right 10/27/2022    Procedure: DILATION, NEPHROSTOMY TRACT;  Surgeon: Chirag Russ MD;  Location: [facility] OR 1ST FLR;  Service: Urology;  Laterality: Right;    ESOPHAGOGASTRODUODENOSCOPY  2014    ESOPHAGOGASTRODUODENOSCOPY  11/18/2020    ESOPHAGOGASTRODUODENOSCOPY N/A 11/18/2020    Procedure: ESOPHAGOGASTRODUODENOSCOPY (EGD);  Surgeon: Zenon Spencer MD;  Location: [facility];  Service: Endoscopy;  Laterality: N/A;    ESOPHAGOGASTRODUODENOSCOPY N/A 12/11/2020    Procedure: EGD (ESOPHAGOGASTRODUODENOSCOPY);  Surgeon: Juancho Muse MD;  Location: [facility] (2ND FLR);  Service: Endoscopy;  Laterality: N/A;    HYSTERECTOMY  2014    [facility] for cervical cancer    ILEOSTOMY  9/17/2020    Procedure: CREATION, ILEOSTOMY;  Surgeon: Hammad Reynolds MD;  Location: [facility] OR 2ND FLR;  Service: Colon and Rectal;;    LOOPOGRAM N/A 4/20/2021    Procedure: LOOPOGRAM;  Surgeon: Leslie Balbuena MD;  Location: [facility] OR 1ST FLR;  Service: Urology;  Laterality: N/A;    LOOPOGRAM N/A 6/6/2023    Procedure: LOOPOGRAM;  Surgeon: Leslie Balbuena MD;  Location: [facility] OR 1ST FLR;  Service: Urology;  Laterality: N/A;    MOBILIZATION OF SPLENIC FLEXURE N/A 9/11/2020    Procedure: MOBILIZATION, COLONIC;  Surgeon: Hammad Reynolds MD;  Location: [facility] OR 2ND FLR;  Service: Colon and Rectal;  Laterality: N/A;    NEPHROSTOGRAPHY Bilateral 4/17/2021    Procedure: Nephrostogram;  Surgeon: Celeste Surgeon;  Location: [facility];  Service: Anesthesiology;  Laterality: Bilateral;    OOPHORECTOMY      PERCUTANEOUS NEPHROLITHOTOMY Right 4/21/2023    Procedure:  NEPHROLITHOTOMY, PERCUTANEOUS;  Surgeon: Chirag Russ MD;  Location: [facility] OR 2ND FLR;  Service: Urology;  Laterality: Right;  2.5 hrs    PERCUTANEOUS NEPHROSTOMY  4/21/2023    Procedure: CREATION, NEPHROSTOMY, PERCUTANEOUS with removal of existing nephrostomy tube;  Surgeon: Chirag Russ MD;  [facility];  Service: Urology;;    PYELOSCOPY Right 10/27/2022    Procedure: PYELOSCOPY;  Surgeon: Chirag Russ MD;  [facility];  Service: Urology;  Laterality: Right;    REPLACEMENT OF NEPHROSTOMY TUBE Right 10/27/2022    Procedure: REPLACEMENT, NEPHROSTOMY TUBE;  Surgeon: Chirag Russ MD;  [facility];  Service: Urology;  Laterality: Right;    RETROPERITONEAL LYMPHADENECTOMY Right 9/17/2018    Procedure: LYMPHADENECTOMY, RETROPERITONEUM;  Surgeon: Sebas Reed MD;  [facility];  Service: General;  Laterality: Right;  ILIAC    URETEROSCOPIC REMOVAL OF URETERIC CALCULUS Right 10/27/2022    Procedure: REMOVAL, CALCULUS, URETER, URETEROSCOPIC;  Surgeon: Chirag Russ MD;  [facility];  Service: Urology;  Laterality: Right;    URETEROSCOPY Right 10/27/2022    Procedure: URETEROSCOPY-ANTEGRADE;  Surgeon: Chirag Russ MD;  Location: [facility] OR [address] [place] [address];  Service: Urology;  Laterality: Right;       Review of patient's allergies indicates:   Allergen Reactions    Bee sting [allergen ext-venom-honey bee]      Rash      Grass pollen-bermuda, standard      rash       No current facility-administered medications on file prior to encounter.     Current Outpatient Medications on File Prior to Encounter   Medication Sig    loratadine (CLARITIN) 10 mg tablet Take 10 mg by mouth daily as needed for Allergies.    mirtazapine (REMERON) 30 MG tablet Take 1 tablet (30 mg total) by mouth nightly.    ondansetron (ZOFRAN-ODT) 4 MG TbDL Dissolve 1 tablet (4 mg total) by mouth every 8 (eight) hours as needed (nausea).    sodium bicarbonate 650  MG tablet as directed Orally     Family History       Problem Relation (Age of Onset)    Breast cancer Maternal Aunt    Cancer Father, Mother    Cervical cancer Mother    Colon cancer Father    Drug abuse Daughter, Daughter    Esophageal cancer Father    Heart disease Sister, Maternal Grandmother    Suicide Daughter          Tobacco Use    Smoking status: Never    Smokeless tobacco: Never   Substance and Sexual Activity    Alcohol use: No     Alcohol/week: 0.0 standard drinks of alcohol    Drug use: No    Sexual activity: Yes     Partners: Male     Birth control/protection: None     Comment:  19 years since 1999     Review of Systems   Constitutional:  Positive for activity change, appetite change and fatigue. Negative for chills and fever.   Respiratory:  Negative for cough and shortness of breath.    Cardiovascular:  Negative for chest pain, palpitations and leg swelling.   Skin:  Negative for rash.   Neurological:  Positive for weakness.   Objective:     Vital Signs (Most Recent):  Temp: 97.8 °F (36.6 °C) (12/07/23 2238)  Pulse: 86 (12/07/23 2238)  Resp: 18 (12/07/23 2238)  BP: (!) 104/58 (12/07/23 2238)  SpO2: 99 % (12/07/23 2238) Vital Signs (24h Range):  Temp:  [97.8 °F (36.6 °C)-98 °F (36.7 °C)] 97.8 °F (36.6 °C)  Pulse:  [] 86  Resp:  [18] 18  SpO2:  [96 %-99 %] 99 %  BP: (104-110)/(58-80) 104/58     Weight: 82.6 kg (182 lb)  Body mass index is 26.88 kg/m².     Physical Exam  Vitals and nursing note reviewed.   Constitutional:       Appearance: She is ill-appearing.   Eyes:      General: No scleral icterus.     Pupils: Pupils are equal, round, and reactive to light.   Cardiovascular:      Rate and Rhythm: Normal rate and regular rhythm.      Pulses: Normal pulses.   Pulmonary:      Effort: Pulmonary effort is normal. No respiratory distress.      Breath sounds: No wheezing.   Abdominal:      Tenderness: There is abdominal tenderness (epigastric).      Comments: RLQ Ileostomy with brown liquid  "stool output, surrounding abdominal wall near ileostomy with erythema/hyperpigmented appearance.     LUQ urostomy  Left Flank nephrostomy tube   Skin:     General: Skin is warm and dry.   Neurological:      General: No focal deficit present.      Mental Status: She is alert.          CRANIAL NERVES     CN III, IV, VI   Pupils are equal, round, and reactive to light.     Significant Labs: All pertinent labs within the past 24 hours have been reviewed.  CBC:   Recent Labs   Lab 12/07/23  1835 12/07/23  1839   WBC 14.24*  --    HGB 15.3  --    HCT 46.9 50     --      CMP:   Recent Labs   Lab 12/07/23 2027      K 4.4   *   CO2 12*      BUN 37*   CREATININE 1.6*   CALCIUM 8.7   PROT 8.0   ALBUMIN 3.1*   BILITOT 0.5   ALKPHOS 141*   AST 33   ALT 35   ANIONGAP 9     Cardiac Markers: No results for input(s): "CKMB", "MYOGLOBIN", "BNP", "TROPISTAT" in the last 48 hours.  Coagulation: No results for input(s): "PT", "INR", "APTT" in the last 48 hours.  Lactic Acid: No results for input(s): "LACTATE" in the last 48 hours.  Urine Studies:   Recent Labs   Lab 12/07/23 2131   COLORU [place]   APPEARANCEUA Cloudy*   PHUR 7.0   SPECGRAV 1.015   PROTEINUA 2+*   GLUCUA Negative   KETONESU Negative   BILIRUBINUA Negative   OCCULTUA 1+*   NITRITE Negative   LEUKOCYTESUR 3+*   RBCUA 43*   WBCUA >100*   BACTERIA Many*   SQUAMEPITHEL 4   HYALINECASTS 0       Significant Imaging: I have reviewed all pertinent imaging results/findings within the past 24 hours.      Assessment/Plan:     * Pyelonephritis  -patient with hx of MDR and recent bouts pyelonephritis, with similar symptoms, epigastric tenderness, leukocytosis, pyuria  -Start Cefepime renal dosed 1gram [id number]   -Add-On Urine gram stain - if gram positive bacteria cocci seen, consult ID for daptomycin use, hx of VRE in past  -Obtain blood cultures  -trend lactic acid  -give 2nd Liter bolus with LR   -PRN IV dilaudid for pain control    -If symptoms not " improving given prior urologic/surgical history would obtain CT Abdomen/Pelvis with contrast if renal function stable and consult Urology.     Azotemia  Mild creatinine elevation but rising BUN, suspect a pre-renal etiology related to nausea/vomiting and decreased oral intake  -Trend Renal function panel      Nephrostomy status  Left Nephrostomy draining urine- if any reduced UOp or ODESSA developing - obtain renal imaging to r/o nephrostomy tube dysfunction      Ileostomy in place  Consult wound care re: ileostomy care supplies while patient is admitted      Metabolic acidosis, NAG, bicarbonate losses  Patient with ileostomy, patient reporting increased output recently  -Continue home oral sodium bicarbonate   -If Serum Bicarb is downtrending- she may require bicarbonate infusion    Trend Lactic acid      Presence of urostomy  Consult wound care re: urostomy care supplies while patient is admitted      Weakness  Suspect related to acute cystitis vs pyelonephritis       Essential hypertension  patient is not on any chronic oral therapy at this time    CKD (chronic kidney disease), stage III  Creatine stable for now. BMP reviewed- noted Estimated Creatinine Clearance: 43 mL/min (A) (based on SCr of 1.6 mg/dL (H)). according to latest data. Based on current GFR, CKD stage is stage 3 - GFR 30-59.  Monitor UOP and serial BMP and adjust therapy as needed. Renally dose meds. Avoid nephrotoxic medications and procedures.    History of DVT (deep vein thrombosis)  -not on chronic anticoagulation  -continue sq enoxaparin DVT ppx        VTE Risk Mitigation (From admission, onward)           Ordered     enoxaparin injection 40 mg  Daily         12/08/23 0225     IP VTE HIGH RISK PATIENT  Once         12/08/23 0225     Place sequential compression device  Until discontinued         12/08/23 0225                       On 12/08/2023, patient should be placed in hospital observation services under my care.             Nathan  MD Mirella  Department of Hospital Medicine  [facility]

## 2023-12-08 NOTE — PROGRESS NOTES
[facility] - Observation [address] [place] [address] Medicine  Progress Note    Patient Name: Edita Riley  MRN: [id number]  Patient Class: IP- Inpatient   Admission Date: 12/7/2023  Length of Stay: 0 days  Attending Physician: Jamar Campbell MD  Primary Care Provider: Trina Vu MD        Subjective:     Principal Problem:Pyelonephritis        HPI:  57 y/o AAF hx of Cervical Cancer s/p XRT & Bilateral Ureteral strictures with obstructive uropathy now s/p transverse colon conduit, s/p Left Nephrostomy tube, s/p Ileostomy, hx of MDR UTI/pyelonephritis presents to the ED with complaints of abdominal pain.  She reports since Monday 12/4, she has had recurrent nausea vomiting over 3 days, poor diet/oral intake with associated weakness.     2 recent hospitalizations with left sided pyelonephritis, prior urine cultures show different MDR bacteria that have grown.  She is pending evaluation by colo-rectal surgery for outpatient revision of urostomy, follows with Dr. Balbuena (urology)     Reports having epigastric pain 10/10 on arrival,  9/10 during interview, only mild relief with initial morphine dosage.   She does not note any change in the ileostomy stool output, no reported change in amount or quality from her urostomy or her left nephrostomy tube. She denies any fevers of chills.     ED Treatment: Morphine 4mg IV,  Zofran 4mg IV, NS 1Liter    Overview/Hospital Course:  No notes on file    Interval History: Patient reports L flank pain and tenderness. She has hx of MDRO and  VRE UTI. Consulted ID. Continuing cefepime for now pending ID eval. Urine cx pending. Urostomy bags draining well     Review of Systems  Objective:     Vital Signs (Most Recent):  Temp: 97.7 °F (36.5 °C) (12/08/23 0752)  Pulse: 89 (12/08/23 1125)  Resp: 18 (12/08/23 0752)  BP: (!) 108/56 (12/08/23 0752)  SpO2: 100 % (12/08/23 0752) Vital Signs (24h Range):  Temp:  [97.7 °F (36.5 °C)-98.4 °F (36.9 °C)] 97.7 °F (36.5 °C)  Pulse:  []  89  Resp:  [17-18] 18  SpO2:  [96 %-100 %] 100 %  BP: (102-110)/(56-80) 108/56     Weight: 83.2 kg (183 lb 6.8 oz)  Body mass index is 28.73 kg/m².    Intake/Output Summary (Last 24 hours) at 12/8/2023 1219  Last data filed at 12/7/2023 2013  Gross per 24 hour   Intake 999 ml   Output --   Net 999 ml         Physical Exam  Vitals and nursing note reviewed.   Constitutional:       Appearance: She is ill-appearing.   Eyes:      General: No scleral icterus.     Pupils: Pupils are equal, round, and reactive to light.   Cardiovascular:      Rate and Rhythm: Normal rate and regular rhythm.      Pulses: Normal pulses.   Pulmonary:      Effort: Pulmonary effort is normal. No respiratory distress.      Breath sounds: No wheezing.   Abdominal:      Tenderness: There is abdominal tenderness (epigastric).      Comments: RLQ Ileostomy with brown liquid stool output, surrounding abdominal wall near ileostomy with erythema/hyperpigmented appearance.     LUQ urostomy  Left Flank nephrostomy tube   Skin:     General: Skin is warm and dry.   Neurological:      General: No focal deficit present.      Mental Status: She is alert.             Assessment/Plan:      * Pyelonephritis  -patient with hx of MDRO and VRE and recent bouts pyelonephritis, with similar symptoms, epigastric tenderness, leukocytosis, pyuria  -Start Cefepime renal dosed 1gram [id number]   -Add-On Urine gram stain   - Follow up urine cx and  blood cultures  -PRN IV dilaudid for pain control  - Consulting ID     Metabolic acidosis, NAG, bicarbonate losses  Patient with ileostomy, patient reporting increased output recently  -Continue home oral sodium bicarbonate   -If Serum Bicarb is downtrending- she may require bicarbonate infusion    Trend Lactic acid      Weakness  Suspect related to acute cystitis vs pyelonephritis       Azotemia  Mild creatinine elevation but rising BUN, suspect a pre-renal etiology related to nausea/vomiting and decreased oral intake  -Trend  Renal function panel      Essential hypertension  patient is not on any chronic oral therapy at this time    CKD (chronic kidney disease), stage III  Creatine stable for now. BMP reviewed- noted Estimated Creatinine Clearance: 43 mL/min (A) (based on SCr of 1.6 mg/dL (H)). according to latest data. Based on current GFR, CKD stage is stage 3 - GFR 30-59.  Monitor UOP and serial BMP and adjust therapy as needed. Renally dose meds. Avoid nephrotoxic medications and procedures.    Nephrostomy status  Left Nephrostomy draining urine- if any reduced UOp or ODESSA developing - obtain renal imaging to r/o nephrostomy tube dysfunction      Presence of urostomy  Consult wound care re: urostomy care supplies while patient is admitted      History of DVT (deep vein thrombosis)  -not on chronic anticoagulation  -continue sq enoxaparin DVT ppx      Ileostomy in place  Consult wound care re: ileostomy care supplies while patient is admitted        VTE Risk Mitigation (From admission, onward)           Ordered     enoxaparin injection 40 mg  Daily         12/08/23 0225     IP VTE HIGH RISK PATIENT  Once         12/08/23 0225     Place sequential compression device  Until discontinued         12/08/23 0225                    Discharge Planning   OK: 12/9/2023     Code Status: Full Code   Is the patient medically ready for discharge?:     Reason for patient still in hospital (select all that apply): Patient trending condition  Discharge Plan A: Home, Home with family                  Jamar Campbell MD  Department of Hospital Medicine   [facility] - Observation 11H

## 2023-12-08 NOTE — CONSULTS
Patient's consult for wound care to abdomen, with denuded skin around the colostomy, this could happen due to leakage from the colostomy. The patient have a urostomy and colostomy bag. Patient states that she have had the colostomy stoma site, for six years and have education on the care. The treatment to the side abdomen apply antifungal powder, the abdomen fold with fungal skin to cleanse with soap and water pat dry apply Aquacel ag cloth, every three days and prn.

## 2023-12-08 NOTE — ASSESSMENT & PLAN NOTE
Rohit on CKD   BMP reviewed- noted Estimated Creatinine Clearance: 34.9 mL/min (A) (based on SCr of 1.9 mg/dL (H)). according to latest data. Based on current GFR, CKD stage is stage 3 - GFR 30-59.  Monitor UOP and serial BMP and adjust therapy as needed. Renally dose meds. Avoid nephrotoxic medications and procedures.    Cr 1.9, baseline 1.4  Likely iso of pyelonephritis  Obtain urine studies and retroperitoneal USG

## 2023-12-08 NOTE — PLAN OF CARE
Problem: Adult Inpatient Plan of Care  Goal: Plan of Care Review  Outcome: Ongoing, Progressing  Goal: Patient-Specific Goal (Individualized)  Outcome: Ongoing, Progressing  Goal: Absence of Hospital-Acquired Illness or Injury  Outcome: Ongoing, Progressing  Goal: Optimal Comfort and Wellbeing  Outcome: Ongoing, Progressing  Goal: Readiness for Transition of Care  Outcome: Ongoing, Progressing     Problem: Infection  Goal: Absence of Infection Signs and Symptoms  Outcome: Ongoing, Progressing     Problem: Skin Injury Risk Increased  Goal: Skin Health and Integrity  Outcome: Ongoing, Progressing     Problem: Fall Injury Risk  Goal: Absence of Fall and Fall-Related Injury  Outcome: Ongoing, Progressing     Problem: Pain Acute  Goal: Acceptable Pain Control and Functional Ability  Outcome: Ongoing, Progressing     Problem: Fatigue  Goal: Improved Activity Tolerance  Outcome: Ongoing, Progressing     Problem: Adjustment to Illness (Chronic Kidney Disease)  Goal: Optimal Coping with Chronic Illness  Outcome: Ongoing, Progressing     Problem: Electrolyte Imbalance (Chronic Kidney Disease)  Goal: Electrolyte Balance  Outcome: Ongoing, Progressing     Problem: Fluid Volume Excess (Chronic Kidney Disease)  Goal: Fluid Balance  Outcome: Ongoing, Progressing     Problem: Functional Decline (Chronic Kidney Disease)  Goal: Optimal Functional Ability  Outcome: Ongoing, Progressing     Problem: Hematologic Alteration (Chronic Kidney Disease)  Goal: Absence of Anemia Signs and Symptoms  Outcome: Ongoing, Progressing     Problem: Oral Intake Inadequate (Chronic Kidney Disease)  Goal: Optimal Oral Intake  Outcome: Ongoing, Progressing     Problem: Pain (Chronic Kidney Disease)  Goal: Acceptable Pain Control  Outcome: Ongoing, Progressing     Problem: Renal Function Impairment (Chronic Kidney Disease)  Goal: Minimize Renal Failure Effects  Outcome: Ongoing, Progressing     Problem: Hypertension Acute  Goal: Blood Pressure Within  Desired Range  Outcome: Ongoing, Progressing     Problem: UTI (Urinary Tract Infection)  Goal: Improved Infection Symptoms  Outcome: Ongoing, Progressing     Problem: Pain Chronic (Persistent) (Comorbidity Management)  Goal: Acceptable Pain Control and Functional Ability  Outcome: Ongoing, Progressing     Problem: Fluid and Electrolyte Imbalance (Acute Kidney Injury/Impairment)  Goal: Fluid and Electrolyte Balance  Outcome: Ongoing, Progressing     Problem: Oral Intake Inadequate (Acute Kidney Injury/Impairment)  Goal: Optimal Nutrition Intake  Outcome: Ongoing, Progressing     Problem: Renal Function Impairment (Acute Kidney Injury/Impairment)  Goal: Effective Renal Function  Outcome: Ongoing, Progressing     Problem: Adjustment to Surgery (Colostomy)  Goal: Optimal Coping  Outcome: Ongoing, Progressing     Problem: Bleeding (Colostomy)  Goal: Absence of Bleeding  Outcome: Ongoing, Progressing     Problem: Fluid, Electrolyte and Nutrition Imbalance (Colostomy)  Goal: Fluid, Electrolyte and Nutrition Balance  Outcome: Ongoing, Progressing     Problem: Infection (Colostomy)  Goal: Absence of Infection Signs and Symptoms  Outcome: Ongoing, Progressing     Problem: Ongoing Anesthesia Effects (Colostomy)  Goal: Anesthesia/Sedation Recovery  Outcome: Ongoing, Progressing     Problem: Pain (Colostomy)  Goal: Acceptable Pain Control  Outcome: Ongoing, Progressing     Problem: Postoperative Nausea and Vomiting (Colostomy)  Goal: Nausea and Vomiting Relief  Outcome: Ongoing, Progressing     Problem: Postoperative Stoma Care (Colostomy)  Goal: Optimal Stoma Healing  Outcome: Ongoing, Progressing     Problem: Postoperative Urinary Retention (Colostomy)  Goal: Effective Urinary Elimination  Outcome: Ongoing, Progressing     Problem: Respiratory Compromise (Colostomy)  Goal: Effective Oxygenation and Ventilation  Outcome: Ongoing, Progressing     Problem: Adjustment to Surgery (Ileostomy)  Goal: Optimal Coping  Outcome: Ongoing,  Progressing     Problem: Bleeding (Ileostomy)  Goal: Absence of Bleeding  Outcome: Ongoing, Progressing     Problem: Fluid, Electrolyte and Nutrition Imbalance (Ileostomy)  Goal: Fluid, Electrolyte and Nutrition Balance  Outcome: Ongoing, Progressing     Problem: Infection (Ileostomy)  Goal: Absence of Infection Signs and Symptoms  Outcome: Ongoing, Progressing     Problem: Ongoing Anesthesia Effects (Ileostomy)  Goal: Anesthesia/Sedation Recovery  Outcome: Ongoing, Progressing     Problem: Pain (Ileostomy)  Goal: Acceptable Pain Control  Outcome: Ongoing, Progressing     Problem: Postoperative Nausea and Vomiting (Ileostomy)  Goal: Nausea and Vomiting Relief  Outcome: Ongoing, Progressing     Problem: Postoperative Stoma Care (Ileostomy)  Goal: Optimal Stoma Healing  Outcome: Ongoing, Progressing     Problem: Postoperative Urinary Retention (Ileostomy)  Goal: Effective Urinary Elimination  Outcome: Ongoing, Progressing     Problem: Respiratory Compromise (Ileostomy)  Goal: Effective Oxygenation and Ventilation  Outcome: Ongoing, Progressing     Problem: Self-Care Deficit  Goal: Improved Ability to Complete Activities of Daily Living  Outcome: Ongoing, Progressing

## 2023-12-08 NOTE — ED NOTES
Telemetry Verification   Patient placed on Telemetry Box  Verified with War Room  Box # [id number]       Rate 82   Rhythm NS

## 2023-12-08 NOTE — ED NOTES
Patient identifiers verified and correct for  MS Riley  C/C:  Nausea, vomiting SEE NN  APPEARANCE: awake and alert in NAD. PAIN  10/10  SKIN: warm, dry and intact. No breakdown or bruising.  MUSCULOSKELETAL: Patient moving all extremities spontaneously, no obvious swelling or deformities noted. Ambulates with max assist   RESPIRATORY: Denies shortness of breath.Respirations unlabored.   CARDIAC: Denies CP, 2+ distal pulses; no peripheral edema  ABDOMEN: ABdomen soft, reports all over abd pain , nausea, vomiting   : aleah nephrostomy tubes to bags, ostomy intact   Neurologic: AAO x 4; follows commands equal strength in all extremities; denies numbness/tingling. Denies dizziness  Denies new wekaness

## 2023-12-08 NOTE — SUBJECTIVE & OBJECTIVE
Interval History: Patient reports L flank pain and tenderness. She has hx of MDRO and  VRE UTI. Consulted ID. Continuing cefepime for now pending ID eval. Urine cx pending. Urostomy bags draining well     Review of Systems  Objective:     Vital Signs (Most Recent):  Temp: 97.7 °F (36.5 °C) (12/08/23 0752)  Pulse: 89 (12/08/23 1125)  Resp: 18 (12/08/23 0752)  BP: (!) 108/56 (12/08/23 0752)  SpO2: 100 % (12/08/23 0752) Vital Signs (24h Range):  Temp:  [97.7 °F (36.5 °C)-98.4 °F (36.9 °C)] 97.7 °F (36.5 °C)  Pulse:  [] 89  Resp:  [17-18] 18  SpO2:  [96 %-100 %] 100 %  BP: (102-110)/(56-80) 108/56     Weight: 83.2 kg (183 lb 6.8 oz)  Body mass index is 28.73 kg/m².    Intake/Output Summary (Last 24 hours) at 12/8/2023 1219  Last data filed at 12/7/2023 2013  Gross per 24 hour   Intake 999 ml   Output --   Net 999 ml         Physical Exam  Vitals and nursing note reviewed.   Constitutional:       Appearance: She is ill-appearing.   Eyes:      General: No scleral icterus.     Pupils: Pupils are equal, round, and reactive to light.   Cardiovascular:      Rate and Rhythm: Normal rate and regular rhythm.      Pulses: Normal pulses.   Pulmonary:      Effort: Pulmonary effort is normal. No respiratory distress.      Breath sounds: No wheezing.   Abdominal:      Tenderness: There is abdominal tenderness (epigastric).      Comments: RLQ Ileostomy with brown liquid stool output, surrounding abdominal wall near ileostomy with erythema/hyperpigmented appearance.     LUQ urostomy  Left Flank nephrostomy tube   Skin:     General: Skin is warm and dry.   Neurological:      General: No focal deficit present.      Mental Status: She is alert.

## 2023-12-08 NOTE — ASSESSMENT & PLAN NOTE
Left Nephrostomy draining urine- if any reduced UOp or ODESSA developing - obtain renal imaging to r/o nephrostomy tube dysfunction

## 2023-12-08 NOTE — ED NOTES
Pt placed in a gown. Pt cleaned from saturated clothes and sheets. Urostomy bags emptied of 200ml. Pictures of pts wounds uploaded to chart and were covered with a bandage. Pt pulled up in the bed.

## 2023-12-08 NOTE — ASSESSMENT & PLAN NOTE
Mild creatinine elevation but rising BUN, suspect a pre-renal etiology related to nausea/vomiting and decreased oral intake  -Trend Renal function panel

## 2023-12-08 NOTE — ED NOTES
Patient arrives via EMS, reports nausea, vomiting, weakness, reports that she can not get up to exam table due to weakness

## 2023-12-09 LAB
ALBUMIN SERPL BCP-MCNC: 3 G/DL (ref 3.5–5.2)
ANION GAP SERPL CALC-SCNC: 10 MMOL/L (ref 8–16)
BASOPHILS # BLD AUTO: 0.05 K/UL (ref 0–0.2)
BASOPHILS NFR BLD: 0.5 % (ref 0–1.9)
BUN SERPL-MCNC: 31 MG/DL (ref 6–20)
CALCIUM SERPL-MCNC: 9.4 MG/DL (ref 8.7–10.5)
CHLORIDE SERPL-SCNC: 115 MMOL/L (ref 95–110)
CO2 SERPL-SCNC: 15 MMOL/L (ref 23–29)
CREAT SERPL-MCNC: 1.9 MG/DL (ref 0.5–1.4)
DIFFERENTIAL METHOD: ABNORMAL
EOSINOPHIL # BLD AUTO: 0.2 K/UL (ref 0–0.5)
EOSINOPHIL NFR BLD: 1.4 % (ref 0–8)
ERYTHROCYTE [DISTWIDTH] IN BLOOD BY AUTOMATED COUNT: 15.1 % (ref 11.5–14.5)
EST. GFR  (NO RACE VARIABLE): 29.9 ML/MIN/1.73 M^2
GLUCOSE SERPL-MCNC: 130 MG/DL (ref 70–110)
HCT VFR BLD AUTO: 37.3 % (ref 37–48.5)
HGB BLD-MCNC: 11.9 G/DL (ref 12–16)
IMM GRANULOCYTES # BLD AUTO: 0.02 K/UL (ref 0–0.04)
IMM GRANULOCYTES NFR BLD AUTO: 0.2 % (ref 0–0.5)
LYMPHOCYTES # BLD AUTO: 1.1 K/UL (ref 1–4.8)
LYMPHOCYTES NFR BLD: 10.5 % (ref 18–48)
MAGNESIUM SERPL-MCNC: 2.3 MG/DL (ref 1.6–2.6)
MCH RBC QN AUTO: 27.7 PG (ref 27–31)
MCHC RBC AUTO-ENTMCNC: 31.9 G/DL (ref 32–36)
MCV RBC AUTO: 87 FL (ref 82–98)
MONOCYTES # BLD AUTO: 1.7 K/UL (ref 0.3–1)
MONOCYTES NFR BLD: 15.5 % (ref 4–15)
NEUTROPHILS # BLD AUTO: 7.8 K/UL (ref 1.8–7.7)
NEUTROPHILS NFR BLD: 71.9 % (ref 38–73)
NRBC BLD-RTO: 0 /100 WBC
PHOSPHATE SERPL-MCNC: 3.3 MG/DL (ref 2.7–4.5)
PLATELET # BLD AUTO: 313 K/UL (ref 150–450)
PMV BLD AUTO: 9.8 FL (ref 9.2–12.9)
POTASSIUM SERPL-SCNC: 3.6 MMOL/L (ref 3.5–5.1)
RBC # BLD AUTO: 4.29 M/UL (ref 4–5.4)
SODIUM SERPL-SCNC: 140 MMOL/L (ref 136–145)
WBC # BLD AUTO: 10.81 K/UL (ref 3.9–12.7)

## 2023-12-09 PROCEDURE — 80069 RENAL FUNCTION PANEL: CPT | Performed by: HOSPITALIST

## 2023-12-09 PROCEDURE — 21400001 HC TELEMETRY ROOM

## 2023-12-09 PROCEDURE — 99232 SBSQ HOSP IP/OBS MODERATE 35: CPT | Mod: ,,, | Performed by: PHYSICIAN ASSISTANT

## 2023-12-09 PROCEDURE — 63600175 PHARM REV CODE 636 W HCPCS: Performed by: HOSPITALIST

## 2023-12-09 PROCEDURE — 83735 ASSAY OF MAGNESIUM: CPT | Performed by: HOSPITALIST

## 2023-12-09 PROCEDURE — 36415 COLL VENOUS BLD VENIPUNCTURE: CPT | Performed by: HOSPITALIST

## 2023-12-09 PROCEDURE — 63600175 PHARM REV CODE 636 W HCPCS: Performed by: STUDENT IN AN ORGANIZED HEALTH CARE EDUCATION/TRAINING PROGRAM

## 2023-12-09 PROCEDURE — 99232 PR SUBSEQUENT HOSPITAL CARE,LEVL II: ICD-10-PCS | Mod: ,,, | Performed by: PHYSICIAN ASSISTANT

## 2023-12-09 PROCEDURE — 25000003 PHARM REV CODE 250: Performed by: HOSPITALIST

## 2023-12-09 PROCEDURE — 85025 COMPLETE CBC W/AUTO DIFF WBC: CPT | Performed by: HOSPITALIST

## 2023-12-09 RX ADMIN — SODIUM BICARBONATE 650 MG TABLET 650 MG: at 09:12

## 2023-12-09 RX ADMIN — SODIUM BICARBONATE 650 MG TABLET 650 MG: at 02:12

## 2023-12-09 RX ADMIN — MIRTAZAPINE 30 MG: 15 TABLET, FILM COATED ORAL at 09:12

## 2023-12-09 RX ADMIN — CEFEPIME 1 G: 1 INJECTION, POWDER, FOR SOLUTION INTRAMUSCULAR; INTRAVENOUS at 01:12

## 2023-12-09 RX ADMIN — SODIUM BICARBONATE 650 MG TABLET 650 MG: at 08:12

## 2023-12-09 RX ADMIN — SODIUM CHLORIDE, POTASSIUM CHLORIDE, SODIUM LACTATE AND CALCIUM CHLORIDE 1000 ML: 600; 310; 30; 20 INJECTION, SOLUTION INTRAVENOUS at 10:12

## 2023-12-09 RX ADMIN — MICONAZOLE NITRATE: 20 POWDER TOPICAL at 08:12

## 2023-12-09 RX ADMIN — ENOXAPARIN SODIUM 40 MG: 40 INJECTION SUBCUTANEOUS at 04:12

## 2023-12-09 RX ADMIN — MICONAZOLE NITRATE: 20 POWDER TOPICAL at 09:12

## 2023-12-09 NOTE — PLAN OF CARE
Problem: Adult Inpatient Plan of Care  Goal: Plan of Care Review  12/9/2023 1415 by Radha Muse RN  Outcome: Ongoing, Progressing  12/9/2023 1415 by Radha Muse RN  Outcome: Ongoing, Progressing  Goal: Patient-Specific Goal (Individualized)  12/9/2023 1415 by Radha Muse RN  Outcome: Ongoing, Progressing  12/9/2023 1415 by Radha Muse RN  Outcome: Ongoing, Progressing  Goal: Absence of Hospital-Acquired Illness or Injury  12/9/2023 1415 by Radha Muse RN  Outcome: Ongoing, Progressing  12/9/2023 1415 by Radha Muse RN  Outcome: Ongoing, Progressing  Goal: Optimal Comfort and Wellbeing  12/9/2023 1415 by Radha Muse RN  Outcome: Ongoing, Progressing  12/9/2023 1415 by Radha Muse RN  Outcome: Ongoing, Progressing  Goal: Readiness for Transition of Care  12/9/2023 1415 by Radha Muse RN  Outcome: Ongoing, Progressing  12/9/2023 1415 by Radha Muse RN  Outcome: Ongoing, Progressing     Problem: Infection  Goal: Absence of Infection Signs and Symptoms  12/9/2023 1415 by Radha Muse RN  Outcome: Ongoing, Progressing  12/9/2023 1415 by Radha Muse RN  Outcome: Ongoing, Progressing     Problem: Impaired Wound Healing  Goal: Optimal Wound Healing  12/9/2023 1415 by Radha Muse RN  Outcome: Ongoing, Progressing  12/9/2023 1415 by Radha Muse RN  Outcome: Ongoing, Progressing     Problem: Fall Injury Risk  Goal: Absence of Fall and Fall-Related Injury  12/9/2023 1415 by Radha Muse RN  Outcome: Ongoing, Progressing  12/9/2023 1415 by Radha Muse RN  Outcome: Ongoing, Progressing     Problem: Skin Injury Risk Increased  Goal: Skin Health and Integrity  12/9/2023 1415 by Radha Mues RN  Outcome: Ongoing, Progressing  12/9/2023 1415 by Radha Muse RN  Outcome: Ongoing, Progressing     Problem: Pain Acute  Goal: Acceptable Pain Control and Functional Ability  12/9/2023 1415 by Radha Muse RN  Outcome: Ongoing, Progressing  12/9/2023 1415 by Micha  AMY Garcia  Outcome: Ongoing, Progressing     Problem: Fatigue  Goal: Improved Activity Tolerance  12/9/2023 1415 by Radha Muse RN  Outcome: Ongoing, Progressing  12/9/2023 1415 by Radha Muse RN  Outcome: Ongoing, Progressing     Problem: Adjustment to Illness (Chronic Kidney Disease)  Goal: Optimal Coping with Chronic Illness  12/9/2023 1415 by Radha Muse RN  Outcome: Ongoing, Progressing  12/9/2023 1415 by Radha Muse RN  Outcome: Ongoing, Progressing     Problem: Electrolyte Imbalance (Chronic Kidney Disease)  Goal: Electrolyte Balance  12/9/2023 1415 by Radha Muse RN  Outcome: Ongoing, Progressing  12/9/2023 1415 by Radha Muse RN  Outcome: Ongoing, Progressing     Problem: Fluid Volume Excess (Chronic Kidney Disease)  Goal: Fluid Balance  12/9/2023 1415 by Radha Muse RN  Outcome: Ongoing, Progressing  12/9/2023 1415 by Radha Muse RN  Outcome: Ongoing, Progressing     Problem: Functional Decline (Chronic Kidney Disease)  Goal: Optimal Functional Ability  12/9/2023 1415 by Radha Muse RN  Outcome: Ongoing, Progressing  12/9/2023 1415 by Radha Muse RN  Outcome: Ongoing, Progressing     Problem: Hematologic Alteration (Chronic Kidney Disease)  Goal: Absence of Anemia Signs and Symptoms  12/9/2023 1415 by Radha Muse RN  Outcome: Ongoing, Progressing  12/9/2023 1415 by Radha Muse RN  Outcome: Ongoing, Progressing     Problem: Oral Intake Inadequate (Chronic Kidney Disease)  Goal: Optimal Oral Intake  12/9/2023 1415 by Radha Muse RN  Outcome: Ongoing, Progressing  12/9/2023 1415 by Radha Muse RN  Outcome: Ongoing, Progressing     Problem: Pain (Chronic Kidney Disease)  Goal: Acceptable Pain Control  12/9/2023 1415 by Radha Muse RN  Outcome: Ongoing, Progressing  12/9/2023 1415 by Radha Muse RN  Outcome: Ongoing, Progressing     Problem: Renal Function Impairment (Chronic Kidney Disease)  Goal: Minimize Renal Failure Effects  12/9/2023 1415  by Radha Muse RN  Outcome: Ongoing, Progressing  12/9/2023 1415 by Radha Muse RN  Outcome: Ongoing, Progressing     Problem: Hypertension Acute  Goal: Blood Pressure Within Desired Range  12/9/2023 1415 by Radha Muse RN  Outcome: Ongoing, Progressing  12/9/2023 1415 by Radha Muse RN  Outcome: Ongoing, Progressing     Problem: UTI (Urinary Tract Infection)  Goal: Improved Infection Symptoms  12/9/2023 1415 by Radha Muse RN  Outcome: Ongoing, Progressing  12/9/2023 1415 by Radha Muse RN  Outcome: Ongoing, Progressing     Problem: Pain Chronic (Persistent) (Comorbidity Management)  Goal: Acceptable Pain Control and Functional Ability  12/9/2023 1415 by Radha Muse RN  Outcome: Ongoing, Progressing  12/9/2023 1415 by Radha Muse RN  Outcome: Ongoing, Progressing     Problem: Fluid and Electrolyte Imbalance (Acute Kidney Injury/Impairment)  Goal: Fluid and Electrolyte Balance  12/9/2023 1415 by Radha Muse RN  Outcome: Ongoing, Progressing  12/9/2023 1415 by Radha Muse RN  Outcome: Ongoing, Progressing     Problem: Oral Intake Inadequate (Acute Kidney Injury/Impairment)  Goal: Optimal Nutrition Intake  12/9/2023 1415 by Radha Muse RN  Outcome: Ongoing, Progressing  12/9/2023 1415 by Radha Muse RN  Outcome: Ongoing, Progressing     Problem: Renal Function Impairment (Acute Kidney Injury/Impairment)  Goal: Effective Renal Function  12/9/2023 1415 by Radha Muse RN  Outcome: Ongoing, Progressing  12/9/2023 1415 by Radha Muse RN  Outcome: Ongoing, Progressing     Problem: Activity Intolerance  Goal: Enhanced Capacity and Energy  12/9/2023 1415 by Radha Muse RN  Outcome: Ongoing, Progressing  12/9/2023 1415 by Radha Muse RN  Outcome: Ongoing, Progressing     Problem: Adjustment to Surgery (Colostomy)  Goal: Optimal Coping  12/9/2023 1415 by Radha Muse RN  Outcome: Ongoing, Progressing  12/9/2023 1415 by Radha Muse RN  Outcome: Ongoing,  Progressing     Problem: Bleeding (Colostomy)  Goal: Absence of Bleeding  12/9/2023 1415 by Radha Muse RN  Outcome: Ongoing, Progressing  12/9/2023 1415 by Radha Muse RN  Outcome: Ongoing, Progressing     Problem: Infection (Colostomy)  Goal: Absence of Infection Signs and Symptoms  12/9/2023 1415 by Radha Muse RN  Outcome: Ongoing, Progressing  12/9/2023 1415 by Radha Muse RN  Outcome: Ongoing, Progressing     Problem: Fluid, Electrolyte and Nutrition Imbalance (Colostomy)  Goal: Fluid, Electrolyte and Nutrition Balance  12/9/2023 1415 by Radha Muse RN  Outcome: Ongoing, Progressing  12/9/2023 1415 by Radha Muse RN  Outcome: Ongoing, Progressing     Problem: Ongoing Anesthesia Effects (Colostomy)  Goal: Anesthesia/Sedation Recovery  12/9/2023 1415 by Radha Muse RN  Outcome: Ongoing, Progressing  12/9/2023 1415 by Radha Muse RN  Outcome: Ongoing, Progressing     Problem: Pain (Colostomy)  Goal: Acceptable Pain Control  12/9/2023 1415 by Radha Muse RN  Outcome: Ongoing, Progressing  12/9/2023 1415 by Radha Muse RN  Outcome: Ongoing, Progressing     Problem: Postoperative Nausea and Vomiting (Colostomy)  Goal: Nausea and Vomiting Relief  12/9/2023 1415 by Radha Muse RN  Outcome: Ongoing, Progressing  12/9/2023 1415 by Radha Muse RN  Outcome: Ongoing, Progressing     Problem: Postoperative Stoma Care (Colostomy)  Goal: Optimal Stoma Healing  12/9/2023 1415 by Radha Muse RN  Outcome: Ongoing, Progressing  12/9/2023 1415 by Radha Muse RN  Outcome: Ongoing, Progressing     Problem: Postoperative Urinary Retention (Colostomy)  Goal: Effective Urinary Elimination  12/9/2023 1415 by Radha Muse RN  Outcome: Ongoing, Progressing  12/9/2023 1415 by Radha Muse RN  Outcome: Ongoing, Progressing     Problem: Respiratory Compromise (Colostomy)  Goal: Effective Oxygenation and Ventilation  12/9/2023 1415 by Radha Muse RN  Outcome: Ongoing,  Progressing  12/9/2023 1415 by Radha Muse RN  Outcome: Ongoing, Progressing     Problem: Adjustment to Surgery (Ileostomy)  Goal: Optimal Coping  12/9/2023 1415 by Radha Muse RN  Outcome: Ongoing, Progressing  12/9/2023 1415 by Radha Muse RN  Outcome: Ongoing, Progressing     Problem: Bleeding (Ileostomy)  Goal: Absence of Bleeding  12/9/2023 1415 by Radha Muse RN  Outcome: Ongoing, Progressing  12/9/2023 1415 by Radha Muse RN  Outcome: Ongoing, Progressing     Problem: Fluid, Electrolyte and Nutrition Imbalance (Ileostomy)  Goal: Fluid, Electrolyte and Nutrition Balance  12/9/2023 1415 by Radha Muse RN  Outcome: Ongoing, Progressing  12/9/2023 1415 by Radha Muse RN  Outcome: Ongoing, Progressing

## 2023-12-09 NOTE — CONSULTS
[facility]  Infectious Disease  Consult Note    Patient Name: Edita Riley  MRN: [id number]  Admission Date: 12/7/2023  Hospital Length of Stay: 0 days  Attending Physician: Jamar Campbell MD  Primary Care Provider: Trina Vu MD     Isolation Status: No active isolations    Patient information was obtained from patient and ER records.      Inpatient consult to Infectious Diseases  Consult performed by: Sonny Solorio Jr., PA  Consult ordered by: Jamar Campbell MD        Assessment/Plan:     ID  * Pyelonephritis  57 yo female with Hx of Cervical Cancer s/p XRT & Bilateral Ureteral strictures with obstructive uropathy now s/p transverse colon conduit, s/p Left Nephrostomy tube, s/p Ileostomy, hx of MDR UTI/pyelonephritis presents to the ED with complaints of abdominal pain and cf pyelonephritis.  UA +.  Blood cultures NGTD.  On cefepime.  No imaging availble.  Feels a little improved.  The patient denies any recent fever, chills, or sweats.      Plan:  Continue cefepime  FU urine cultures  Rec CT ABD/Pelvis w/out contrast  Discussed with ID staff  Will follow.           Thank you for your consult. I will follow-up with patient. Please contact us if you have any additional questions.    ADRIAN Armenta  Infectious Disease  [facility]    Subjective:     Principal Problem: Pyelonephritis    HPI: 59 y/o AAF hx of Cervical Cancer s/p XRT & Bilateral Ureteral strictures with obstructive uropathy now s/p transverse colon conduit, s/p Left Nephrostomy tube, s/p Ileostomy, hx of MDR UTI/pyelonephritis presents to the ED with complaints of abdominal pain.  Since Monday 12/4, she has had recurrent nausea vomiting over 3 days, poor diet/oral intake with associated weakness.      2 recent hospitalizations with left sided pyelonephritis, prior urine cultures show different MDR bacteria that have grown.  She is pending evaluation by colo-rectal surgery for outpatient  revision of urostomy, follows with Dr. Balbuena (urology)      Reported having epigastric pain 10/10 on arrival in ED  9/10 only mild relief with initial morphine dosage.      Patient admitted and UA/UCx obtained.  UA with WBC >100, 3+ leukocytes.  Started on cefepime.  Blood cultures NGTD. WBC 13-14.  ID consulted for abx recs.  No imaging done.    Patient reports feeling a little better.  Still with L sided back pain.  The patient denies any recent fever, chills, or sweats.  N/V resolved.      Past Medical History:   Diagnosis Date    Abnormal mammogram 08/25/2020    Abnormal mammogram 08/25/2020    Acute blood loss anemia     Acute deep vein thrombosis (DVT) of lower extremity 12/09/2020    Advance care planning 04/30/2021    ODESSA (acute kidney injury) 03/21/2020    Anemia due to chronic blood loss     Anemia due to chronic kidney disease     Anemia due to chronic kidney disease 05/18/2020    Anxiety     Bilateral ureteral obstruction 09/11/2020    Bilious vomiting with nausea 06/11/2023    Cardiovascular event risk -- low 09/14/2015    ASCVD 10-year risk 1.9% (with optimal risk factors 1.3%) as of 9/14/2015     Cervical cancer 2014    Chronic back pain     Colostomy care     Deep vein thrombosis     Depression     Diarrhea due to malabsorption 11/14/2018    Difficult intubation     Discharge planning issues 04/30/2021    Disorder of kidney and ureter     DVT of lower extremity, bilateral 11/04/2020    Edema 04/10/2023    Emphysema of lung 04/10/2023    Fibromyalgia     Fungemia 09/27/2020    Generalized abdominal pain 08/25/2020    GERD (gastroesophageal reflux disease)     Hemifacial spasm 09/16/2015    Hiatal hernia 2014    History of cervical cancer 10/11/2018    History of essential hypertension 05/04/2015    Not requiring medications at this time  BP is low- concern that this may correlate with increased stool output from stoma. Encourage her to contact GI and her PCP.    Hx of psychiatric care     Cymbalta,  trazodone    Hypertension     Hypomagnesemia 11/21/2018    Hyponatremia 09/10/2023    Impaired functional mobility, balance, gait, and endurance 07/24/2015    Lactose intolerance     Major depressive disorder, recurrent episode, moderate 06/02/2023    Metastatic squamous cell carcinoma to lymph node 10/11/2018    Moderate episode of recurrent major depressive disorder 04/21/2021    Nephrostomy complication 10/26/2023    Neuropathy due to chemotherapeutic drug 11/14/2018    Osteoarthritis of back     Peritonitis 09/22/2020    Physical deconditioning 04/30/2021    Pseudomonas urinary tract infection 04/21/2021    Psychiatric problem     Pyelitis 09/09/2023    QT prolongation 04/16/2021    Refusal of blood transfusions as patient is [employer]     Schatzki's ring 09/14/2015    Seen on outside EGD 05/2014, underwent esophageal dilatation. Bx were negative.     Seizure-like activity 12/02/2018    Seizures     Sleep stage dysfunction     Noted on PSG 06/2017; negative for obstructive sleep apnea     Stroke     Urinary tract infection associated with nephrostomy catheter 06/11/2020    Wound infection after surgery 09/24/2020       Past Surgical History:   Procedure Laterality Date    ANTEGRADE NEPHROSTOGRAPHY Bilateral 9/28/2020    Procedure: Nephrostogram - antegrade;  Surgeon: Leslie Balbuena MD;  Location: [address];  Service: Urology;  Laterality: Bilateral;    ANTEGRADE NEPHROSTOGRAPHY Left 4/20/2021    Procedure: NEPHROSTOGRAM, ANTEGRADE;  Surgeon: Leslie Balbuena MD;  Location: [address];  Service: Urology;  Laterality: Left;    ANTEGRADE NEPHROSTOGRAPHY Right 10/27/2022    Procedure: NEPHROSTOGRAM, ANTEGRADE;  Surgeon: Chirag Russ MD;  Location: [address];  Service: Urology;  Laterality: Right;    ANTEGRADE NEPHROSTOGRAPHY Right 4/21/2023    Procedure: NEPHROSTOGRAM, ANTEGRADE;  Surgeon: Chirag Russ MD;  [facility];  Service: Urology;  Laterality: Right;     ANTEGRADE NEPHROSTOGRAPHY Left 6/6/2023    Procedure: Nephrostogram - antegrade;  Surgeon: Leslie Balbuena MD;  Location: [facility] OR 1ST FLR;  Service: Urology;  Laterality: Left;    BILATERAL OOPHORECTOMY  2015    CHOLECYSTECTOMY  11/09/2016    Done at [facility], path showed chronic cholecystitis and gallstones    cold knife conization  2014    COLECTOMY, RIGHT  9/17/2020    Procedure: COLECTOMY, RIGHT Extended;  Surgeon: Hammad Reynolds MD;  Location: [facility] OR 2ND FLR;  Service: Colon and Rectal;;    COLONOSCOPY  2014    COLONOSCOPY N/A 10/24/2016    at [facility], Dr Gutiérrez    COLONOSCOPY N/A 5/18/2018    Procedure: COLONOSCOPY;  Surgeon: Arden Gutiérrez MD;  Location: [facility] (4TH FLR);  Service: Endoscopy;  Laterality: N/A;    COLONOSCOPY N/A 7/28/2020    Procedure: COLONOSCOPY;  Surgeon: Hammad Reynolds MD;  Location: [facility] (4TH FLR);  Service: Colon and Rectal;  Laterality: N/A;  covid test [place] 7/25    CYSTOSCOPY WITH URETEROSCOPY, RETROGRADE PYELOGRAPHY, AND INSERTION OF STENT Bilateral 3/21/2020    Procedure: CYSTOSCOPY, WITH RETROGRADE PYELOGRAM,;  Surgeon: Leslie Balbuena MD;  Location: [facility] OR 1ST FLR;  Service: Urology;  Laterality: Bilateral;    DILATION OF NEPHROSTOMY TRACT Right 10/27/2022    Procedure: DILATION, NEPHROSTOMY TRACT;  Surgeon: Chirag Russ MD;  [facility];  Service: Urology;  Laterality: Right;    ESOPHAGOGASTRODUODENOSCOPY  2014    ESOPHAGOGASTRODUODENOSCOPY  11/18/2020    ESOPHAGOGASTRODUODENOSCOPY N/A 11/18/2020    Procedure: ESOPHAGOGASTRODUODENOSCOPY (EGD);  Surgeon: eZnon Spencer MD;  Location: [facility];  Service: Endoscopy;  Laterality: N/A;    ESOPHAGOGASTRODUODENOSCOPY N/A 12/11/2020    Procedure: EGD (ESOPHAGOGASTRODUODENOSCOPY);  Surgeon: Juancho Muse MD;  Location: [facility] (2ND FLR);  Service: Endoscopy;  Laterality: N/A;    HYSTERECTOMY  2014    [facility] for cervical cancer    ILEOSTOMY  9/17/2020    Procedure: CREATION, ILEOSTOMY;   Surgeon: Hammad Reynolds MD;  Location: [facility] OR 2ND FLR;  Service: Colon and Rectal;;    LOOPOGRAM N/A 4/20/2021    Procedure: LOOPOGRAM;  Surgeon: Leslie Balbuena MD;  Location: [facility] OR 1ST FLR;  Service: Urology;  Laterality: N/A;    LOOPOGRAM N/A 6/6/2023    Procedure: LOOPOGRAM;  Surgeon: Leslie Balbuena MD;  Location: [facility] OR 1ST FLR;  Service: Urology;  Laterality: N/A;    MOBILIZATION OF SPLENIC FLEXURE N/A 9/11/2020    Procedure: MOBILIZATION, COLONIC;  Surgeon: Hammad Reynolds MD;  Location: [facility] OR 2ND FLR;  Service: Colon and Rectal;  Laterality: N/A;    NEPHROSTOGRAPHY Bilateral 4/17/2021    Procedure: Nephrostogram;  Surgeon: Celeste Surgeon;  Location: [facility];  Service: Anesthesiology;  Laterality: Bilateral;    OOPHORECTOMY      PERCUTANEOUS NEPHROLITHOTOMY Right 4/21/2023    Procedure: NEPHROLITHOTOMY, PERCUTANEOUS;  Surgeon: Chirag Russ MD;  Location: [facility] OR 2ND FLR;  Service: Urology;  Laterality: Right;  2.5 hrs    PERCUTANEOUS NEPHROSTOMY  4/21/2023    Procedure: CREATION, NEPHROSTOMY, PERCUTANEOUS with removal of existing nephrostomy tube;  Surgeon: Chirag Russ MD;  [facility];  Service: Urology;;    PYELOSCOPY Right 10/27/2022    Procedure: PYELOSCOPY;  Surgeon: Chirag Russ MD;  [facility];  Service: Urology;  Laterality: Right;    REPLACEMENT OF NEPHROSTOMY TUBE Right 10/27/2022    Procedure: REPLACEMENT, NEPHROSTOMY TUBE;  Surgeon: Chirag Russ MD;  [facility]: [facility];  Service: Urology;  Laterality: Right;    RETROPERITONEAL LYMPHADENECTOMY Right 9/17/2018    Procedure: LYMPHADENECTOMY, RETROPERITONEUM;  Surgeon: Sebas Reed MD;  Location: [facility] OR 2ND FLR;  Service: General;  Laterality: Right;  ILIAC    URETEROSCOPIC REMOVAL OF URETERIC CALCULUS Right 10/27/2022    Procedure: REMOVAL, CALCULUS, URETER, URETEROSCOPIC;  Surgeon: Chirag Russ MD;  [facility];  Service: Urology;  Laterality: Right;     URETEROSCOPY Right 10/27/2022    Procedure: URETEROSCOPY-ANTEGRADE;  Surgeon: Chirag Russ MD;  Location: [facility] OR [address] [place] [address];  Service: Urology;  Laterality: Right;       Review of patient's allergies indicates:   Allergen Reactions    Bee sting [allergen ext-venom-honey bee]      Rash      Grass pollen-bermuda, standard      rash       Medications:  Medications Prior to Admission   Medication Sig    loratadine (CLARITIN) 10 mg tablet Take 10 mg by mouth daily as needed for Allergies.    mirtazapine (REMERON) 30 MG tablet Take 1 tablet (30 mg total) by mouth nightly.    ondansetron (ZOFRAN-ODT) 4 MG TbDL Dissolve 1 tablet (4 mg total) by mouth every 8 (eight) hours as needed (nausea).    sodium bicarbonate 650 MG tablet Take 650 mg by mouth 3 (three) times daily.     Antibiotics (From admission, onward)      Start     Stop Route Frequency Ordered    12/08/23 0045  ceFEPIme (MAXIPIME) 1 g in dextrose 5 % in water (D5W) 100 mL IVPB (MB+)         -- IV Every 12 hours (non-standard times) 12/07/23 2341          Antifungals (From admission, onward)      Start     Stop Route Frequency Ordered    12/08/23 1130  miconazole NITRATE 2 % top powder         -- Top 2 times daily 12/08/23 1021          Antivirals (From admission, onward)      None             Immunization History   Administered Date(s) Administered    Influenza - Quadrivalent - PF *Preferred* (6 months and older) 11/16/2017, 09/19/2019    Tdap 03/28/2017       Family History       Problem Relation (Age of Onset)    Breast cancer Maternal Aunt    Cancer Father, Mother    Cervical cancer Mother    Colon cancer Father    Drug abuse Daughter, Daughter    Esophageal cancer Father    Heart disease Sister, Maternal Grandmother    Suicide Daughter          Social History     Socioeconomic History    Marital status:      Spouse name: Hammad    Number of children: 2   Tobacco Use    Smoking status: Never    Smokeless tobacco: Never   Substance and Sexual Activity     Alcohol use: No     Alcohol/week: 0.0 standard drinks of alcohol    Drug use: No    Sexual activity: Yes     Partners: Male     Birth control/protection: None     Comment:  19 years since    Social History Narrative    , twin daughters (1  2018), disabled due to childhood stroke, Hinduism sophia     Social Determinants of Health     Financial Resource Strain: Low Risk  (2023)    Overall Financial Resource Strain (CARDIA)     Difficulty of Paying Living Expenses: Not hard at all   Recent Concern: Financial Resource Strain - Medium Risk (2023)    Overall Financial Resource Strain (CARDIA)     Difficulty of Paying Living Expenses: Somewhat hard   Food Insecurity: No Food Insecurity (2023)    Hunger Vital Sign     Worried About Running Out of Food in the Last Year: Never true     Ran Out of Food in the Last Year: Never true   Transportation Needs: No Transportation Needs (2023)    PRAPARE - Transportation     Lack of Transportation (Medical): No     Lack of Transportation (Non-Medical): No   Physical Activity: Insufficiently Active (2023)    Exercise Vital Sign     Days of Exercise per Week: 4 days     Minutes of Exercise per Session: 20 min   Stress: Stress Concern Present (2023)    [employer] [place] [employer] - Occupational Stress Questionnaire     Feeling of Stress : To some extent   Social Connections: Socially Integrated (2023)    Social Connection and Isolation Panel [NHANES]     Frequency of Communication with Friends and Family: Three times a week     Frequency of Social Gatherings with Friends and Family: Once a week     Attends Shinto Services: 1 to 4 times per year     Active Member of Clubs or Organizations: No     Attends Club or Organization Meetings: 1 to 4 times per year     Marital Status:    Recent Concern: Social Connections - Moderately Isolated (2023)    Social Connection and Isolation Panel  [NHANES]     Frequency of Communication with Friends and Family: Three times a week     Frequency of Social Gatherings with Friends and Family: Three times a week     Attends Rastafari Services: Never     Active Member of Clubs or Organizations: No     Attends Club or Organization Meetings: Never     Marital Status:    Housing Stability: Low Risk  (12/8/2023)    Housing Stability Vital Sign     Unable to Pay for Housing in the Last Year: No     Number of Places Lived in the Last Year: 1     Unstable Housing in the Last Year: No     Review of Systems   Constitutional:  Negative for appetite change, chills, diaphoresis, fatigue, fever and unexpected weight change.   HENT:  Negative for congestion, ear pain, hearing loss, sore throat and tinnitus.    Eyes:  Negative for pain, redness and visual disturbance.   Respiratory:  Negative for cough, chest tightness, shortness of breath and wheezing.    Cardiovascular:  Negative for chest pain.   Gastrointestinal:  Negative for abdominal pain, constipation, diarrhea, nausea and vomiting.   Endocrine: Negative for cold intolerance and heat intolerance.   Genitourinary:  Positive for flank pain. Negative for decreased urine volume, difficulty urinating, dysuria, frequency, hematuria and urgency.   Musculoskeletal:  Negative for arthralgias, back pain, myalgias and neck pain.   Skin:  Negative for rash and wound.   Allergic/Immunologic: Negative for environmental allergies, food allergies and immunocompromised state.   Neurological:  Negative for dizziness, facial asymmetry, weakness, light-headedness, numbness and headaches.   Hematological:  Negative for adenopathy. Does not bruise/bleed easily.   Psychiatric/Behavioral:  Negative for agitation, behavioral problems and confusion.      Objective:     Vital Signs (Most Recent):  Temp: 97.8 °F (36.6 °C) (12/08/23 1956)  Pulse: 86 (12/08/23 1956)  Resp: 17 (12/08/23 1956)  BP: (!) 98/54 (12/08/23 1956)  SpO2: 100 % (12/08/23  1956) Vital Signs (24h Range):  Temp:  [97.5 °F (36.4 °C)-98.4 °F (36.9 °C)] 97.8 °F (36.6 °C)  Pulse:  [84-99] 86  Resp:  [17-18] 17  SpO2:  [98 %-100 %] 100 %  BP: ()/(54-62) 98/54     Weight: 83.2 kg (183 lb 6.8 oz)  Body mass index is 28.73 kg/m².    Estimated Creatinine Clearance: 34.9 mL/min (A) (based on SCr of 1.9 mg/dL (H)).     Physical Exam  Constitutional:       General: She is not in acute distress.     Appearance: Normal appearance. She is well-developed. She is not ill-appearing, toxic-appearing or diaphoretic.       HENT:      Head: Normocephalic and atraumatic.   Cardiovascular:      Rate and Rhythm: Normal rate and regular rhythm.      Heart sounds: Normal heart sounds. No murmur heard.     No friction rub. No gallop.   Pulmonary:      Effort: Pulmonary effort is normal. No respiratory distress.      Breath sounds: Normal breath sounds. No wheezing or rales.   Abdominal:      General: Bowel sounds are normal. There is no distension.      Palpations: Abdomen is soft. There is no mass.      Tenderness: There is no abdominal tenderness. There is no guarding or rebound.   Skin:     General: Skin is warm and dry.   Neurological:      Mental Status: She is alert and oriented to person, place, and time.   Psychiatric:         Behavior: Behavior normal.          Significant Labs: Blood Culture:   Recent Labs   Lab 08/18/23 2043 09/09/23 1806 09/09/23 1807 12/07/23 1953 12/08/23  0155   LABBLOO No growth after 5 days.  No growth after 5 days. No growth after 5 days. No growth after 5 days. No Growth to date  No Growth to date No Growth to date  No Growth to date     CBC:   Recent Labs   Lab 12/07/23 1835 12/07/23 1839 12/08/23  0325   WBC 14.24*  --  13.66*   HGB 15.3  --  12.7   HCT 46.9 50 41.2     --  323     CMP:   Recent Labs   Lab 12/07/23 2027 12/08/23  0325    140   K 4.4 4.2   * 116*   CO2 12* 15*    128*   BUN 37* 39*   CREATININE 1.6* 1.9*   CALCIUM 8.7  "9.6   PROT 8.0  --    ALBUMIN 3.1* 3.1*   BILITOT 0.5  --    ALKPHOS 141*  --    AST 33  --    ALT 35  --    ANIONGAP 9 9     Urine Culture:   Recent Labs   Lab 08/18/23  2352 09/09/23  1416 09/11/23  1226 10/21/23  2018 11/03/23  2119   LABURIN Multiple organisms isolated. None in predominance.  Repeat if  clinically necessary. ENTEROBACTER CLOACAE  >100,000 cfu/ml  *  ACINETOBACTER BAUMANNII/HAEMOLYTICUS  50,000 - 99,999 cfu/ml  * ENTEROCOCCUS FAECIUM VRE  10,000 - 49,999 cfu/ml  No other significant isolate  * ENTEROCOCCUS FAECALIS  >100,000 cfu/ml  No other significant isolate  * ESCHERICHIA COLI  >100,000 cfu/ml  *     Urine Studies:   Recent Labs   Lab 12/07/23  2131   COLORU [place]   APPEARANCEUA Cloudy*   PHUR 7.0   SPECGRAV 1.015   PROTEINUA 2+*   GLUCUA Negative   KETONESU Negative   BILIRUBINUA Negative   OCCULTUA 1+*   NITRITE Negative   LEUKOCYTESUR 3+*   RBCUA 43*   WBCUA >100*   BACTERIA Many*   SQUAMEPITHEL 4   HYALINECASTS 0     Wound Culture: No results for input(s): "LABAERO" in the last 4320 hours.  All pertinent labs within the past 24 hours have been reviewed.    Significant Imaging: I have reviewed all pertinent imaging results/findings within the past 24 hours.Radiology Results (last 7 days)    ** No results found for the last 168 hours. **     Imaging History                   "

## 2023-12-09 NOTE — ASSESSMENT & PLAN NOTE
59 yo female with Hx of Cervical Cancer s/p XRT & Bilateral Ureteral strictures with obstructive uropathy now s/p transverse colon conduit, s/p Left Nephrostomy tube, s/p Ileostomy, hx of MDR UTI/pyelonephritis presents to the ED with complaints of abdominal pain and cf pyelonephritis.  UA + and UCX with GNR from L nephrostomy tue.  Blood cultures NGTD.  On cefepime.  Feels a little improved.  CT ABD/PEelvis with stable hydronephrosis BL but no mention perinephric fat stranding.  The patient denies any recent fever, chills, or sweats.      Plan:  Continue cefepime  FU urine cultures  Will need L PCNT exchanged once cultures known and abx tailored  Discussed with ID staff  Will follow.

## 2023-12-09 NOTE — SUBJECTIVE & OBJECTIVE
Past Medical History:   Diagnosis Date    Abnormal mammogram 08/25/2020    Abnormal mammogram 08/25/2020    Acute blood loss anemia     Acute deep vein thrombosis (DVT) of lower extremity 12/09/2020    Advance care planning 04/30/2021    ODESSA (acute kidney injury) 03/21/2020    Anemia due to chronic blood loss     Anemia due to chronic kidney disease     Anemia due to chronic kidney disease 05/18/2020    Anxiety     Bilateral ureteral obstruction 09/11/2020    Bilious vomiting with nausea 06/11/2023    Cardiovascular event risk -- low 09/14/2015    ASCVD 10-year risk 1.9% (with optimal risk factors 1.3%) as of 9/14/2015     Cervical cancer 2014    Chronic back pain     Colostomy care     Deep vein thrombosis     Depression     Diarrhea due to malabsorption 11/14/2018    Difficult intubation     Discharge planning issues 04/30/2021    Disorder of kidney and ureter     DVT of lower extremity, bilateral 11/04/2020    Edema 04/10/2023    Emphysema of lung 04/10/2023    Fibromyalgia     Fungemia 09/27/2020    Generalized abdominal pain 08/25/2020    GERD (gastroesophageal reflux disease)     Hemifacial spasm 09/16/2015    Hiatal hernia 2014    History of cervical cancer 10/11/2018    History of essential hypertension 05/04/2015    Not requiring medications at this time  BP is low- concern that this may correlate with increased stool output from stoma. Encourage her to contact GI and her PCP.    Hx of psychiatric care     Cymbalta, trazodone    Hypertension     Hypomagnesemia 11/21/2018    Hyponatremia 09/10/2023    Impaired functional mobility, balance, gait, and endurance 07/24/2015    Lactose intolerance     Major depressive disorder, recurrent episode, moderate 06/02/2023    Metastatic squamous cell carcinoma to lymph node 10/11/2018    Moderate episode of recurrent major depressive disorder 04/21/2021    Nephrostomy complication 10/26/2023    Neuropathy due to chemotherapeutic drug 11/14/2018    Osteoarthritis of back      Peritonitis 09/22/2020    Physical deconditioning 04/30/2021    Pseudomonas urinary tract infection 04/21/2021    Psychiatric problem     Pyelitis 09/09/2023    QT prolongation 04/16/2021    Refusal of blood transfusions as patient is Confucianist     Schatzki's ring 09/14/2015    Seen on outside EGD 05/2014, underwent esophageal dilatation. Bx were negative.     Seizure-like activity 12/02/2018    Seizures     Sleep stage dysfunction     Noted on PSG 06/2017; negative for obstructive sleep apnea     Stroke     Urinary tract infection associated with nephrostomy catheter 06/11/2020    Wound infection after surgery 09/24/2020       Past Surgical History:   Procedure Laterality Date    ANTEGRADE NEPHROSTOGRAPHY Bilateral 9/28/2020    Procedure: Nephrostogram - antegrade;  Surgeon: Leslie Balbuena MD;  [facility];  Service: Urology;  Laterality: Bilateral;    ANTEGRADE NEPHROSTOGRAPHY Left 4/20/2021    Procedure: NEPHROSTOGRAM, ANTEGRADE;  Surgeon: Leslie Balbuena MD;  [facility];  Service: Urology;  Laterality: Left;    ANTEGRADE NEPHROSTOGRAPHY Right 10/27/2022    Procedure: NEPHROSTOGRAM, ANTEGRADE;  Surgeon: Chirag Russ MD;  [facility];  Service: Urology;  Laterality: Right;    ANTEGRADE NEPHROSTOGRAPHY Right 4/21/2023    Procedure: NEPHROSTOGRAM, ANTEGRADE;  Surgeon: Chirag Russ MD;  Location: [facility] OR 2ND FLR;  Service: Urology;  Laterality: Right;    ANTEGRADE NEPHROSTOGRAPHY Left 6/6/2023    Procedure: Nephrostogram - antegrade;  Surgeon: Leslie Balbuena MD;  [facility];  Service: Urology;  Laterality: Left;    BILATERAL OOPHORECTOMY  2015    CHOLECYSTECTOMY  11/09/2016    Done at [facility], path showed chronic cholecystitis and gallstones    cold knife conization  2014    COLECTOMY, RIGHT  9/17/2020    Procedure: COLECTOMY, RIGHT Extended;  Surgeon: Hammad Reynolds MD;  Location: [facility] OR 2ND FLR;  Service: Colon and Rectal;;     COLONOSCOPY  2014    COLONOSCOPY N/A 10/24/2016    at [facility], Dr Gutiérrez    COLONOSCOPY N/A 5/18/2018    Procedure: COLONOSCOPY;  Surgeon: Arden Gutiérrez MD;  Location: [facility] (4TH FLR);  Service: Endoscopy;  Laterality: N/A;    COLONOSCOPY N/A 7/28/2020    Procedure: COLONOSCOPY;  Surgeon: Hammad Reynolds MD;  Location: [facility] (4TH FLR);  Service: Colon and Rectal;  Laterality: N/A;  covid test [place] 7/25    CYSTOSCOPY WITH URETEROSCOPY, RETROGRADE PYELOGRAPHY, AND INSERTION OF STENT Bilateral 3/21/2020    Procedure: CYSTOSCOPY, WITH RETROGRADE PYELOGRAM,;  Surgeon: Leslie Balbuena MD;  Location: [facility] OR 1ST FLR;  Service: Urology;  Laterality: Bilateral;    DILATION OF NEPHROSTOMY TRACT Right 10/27/2022    Procedure: DILATION, NEPHROSTOMY TRACT;  Surgeon: Chirag Russ MD;  Location: [facility] OR 1ST FLR;  Service: Urology;  Laterality: Right;    ESOPHAGOGASTRODUODENOSCOPY  2014    ESOPHAGOGASTRODUODENOSCOPY  11/18/2020    ESOPHAGOGASTRODUODENOSCOPY N/A 11/18/2020    Procedure: ESOPHAGOGASTRODUODENOSCOPY (EGD);  Surgeon: Zenon Spencer MD;  Location: [facility];  Service: Endoscopy;  Laterality: N/A;    ESOPHAGOGASTRODUODENOSCOPY N/A 12/11/2020    Procedure: EGD (ESOPHAGOGASTRODUODENOSCOPY);  Surgeon: Juancho Muse MD;  Location: [facility] (2ND FLR);  Service: Endoscopy;  Laterality: N/A;    HYSTERECTOMY  2014    [facility] for cervical cancer    ILEOSTOMY  9/17/2020    Procedure: CREATION, ILEOSTOMY;  Surgeon: Hammad Reynolds MD;  Location: [facility] OR 2ND FLR;  Service: Colon and Rectal;;    LOOPOGRAM N/A 4/20/2021    Procedure: LOOPOGRAM;  Surgeon: Leslie Balbuena MD;  Location: [facility] OR 1ST FLR;  Service: Urology;  Laterality: N/A;    LOOPOGRAM N/A 6/6/2023    Procedure: LOOPOGRAM;  Surgeon: Leslie Balbuena MD;  Location: [facility] OR 1ST FLR;  Service: Urology;  Laterality: N/A;    MOBILIZATION OF SPLENIC FLEXURE N/A 9/11/2020    Procedure: MOBILIZATION, COLONIC;  Surgeon: Hammad Reynolds MD;   Location: [facility] OR 2ND FLR;  Service: Colon and Rectal;  Laterality: N/A;    NEPHROSTOGRAPHY Bilateral 4/17/2021    Procedure: Nephrostogram;  Surgeon: Celeste Surgeon;  Location: [facility];  Service: Anesthesiology;  Laterality: Bilateral;    OOPHORECTOMY      PERCUTANEOUS NEPHROLITHOTOMY Right 4/21/2023    Procedure: NEPHROLITHOTOMY, PERCUTANEOUS;  Surgeon: Chirag Russ MD;  Location: [facility] OR 2ND FLR;  Service: Urology;  Laterality: Right;  2.5 hrs    PERCUTANEOUS NEPHROSTOMY  4/21/2023    Procedure: CREATION, NEPHROSTOMY, PERCUTANEOUS with removal of existing nephrostomy tube;  Surgeon: Chirag Russ MD;  Location: [facility] OR [facility] FLR;  Service: Urology;;    PYELOSCOPY Right 10/27/2022    Procedure: PYELOSCOPY;  Surgeon: Chirag Russ MD;  [facility];  Service: Urology;  Laterality: Right;    REPLACEMENT OF NEPHROSTOMY TUBE Right 10/27/2022    Procedure: REPLACEMENT, NEPHROSTOMY TUBE;  Surgeon: Chirag Russ MD;  [facility];  Service: Urology;  Laterality: Right;    RETROPERITONEAL LYMPHADENECTOMY Right 9/17/2018    Procedure: LYMPHADENECTOMY, RETROPERITONEUM;  Surgeon: Sebas Reed MD;  [facility];  Service: General;  Laterality: Right;  ILIAC    URETEROSCOPIC REMOVAL OF URETERIC CALCULUS Right 10/27/2022    Procedure: REMOVAL, CALCULUS, URETER, URETEROSCOPIC;  Surgeon: Chirag Russ MD;  [facility];  Service: Urology;  Laterality: Right;    URETEROSCOPY Right 10/27/2022    Procedure: URETEROSCOPY-ANTEGRADE;  Surgeon: Chirag Russ MD;  [facility];  Service: Urology;  Laterality: Right;       Review of patient's allergies indicates:   Allergen Reactions    Bee sting [allergen ext-venom-honey bee]      Rash      Grass pollen-bermuda, standard      rash       Medications:  Medications Prior to Admission   Medication Sig    loratadine (CLARITIN) 10 mg tablet Take 10 mg by mouth daily as needed for Allergies.     mirtazapine (REMERON) 30 MG tablet Take 1 tablet (30 mg total) by mouth nightly.    ondansetron (ZOFRAN-ODT) 4 MG TbDL Dissolve 1 tablet (4 mg total) by mouth every 8 (eight) hours as needed (nausea).    sodium bicarbonate 650 MG tablet Take 650 mg by mouth 3 (three) times daily.     Antibiotics (From admission, onward)      Start     Stop Route Frequency Ordered    23 0045  ceFEPIme (MAXIPIME) 1 g in dextrose 5 % in water (D5W) 100 mL IVPB (MB+)         -- IV Every 12 hours (non-standard times) 23 2341          Antifungals (From admission, onward)      Start     Stop Route Frequency Ordered    23 1130  miconazole NITRATE 2 % top powder         -- Top 2 times daily 23 1021          Antivirals (From admission, onward)      None             Immunization History   Administered Date(s) Administered    Influenza - Quadrivalent - PF *Preferred* (6 months and older) 2017, 2019    Tdap 2017       Family History       Problem Relation (Age of Onset)    Breast cancer Maternal Aunt    Cancer Father, Mother    Cervical cancer Mother    Colon cancer Father    Drug abuse Daughter, Daughter    Esophageal cancer Father    Heart disease Sister, Maternal Grandmother    Suicide Daughter          Social History     Socioeconomic History    Marital status:      Spouse name: Hammad    Number of children: 2   Tobacco Use    Smoking status: Never    Smokeless tobacco: Never   Substance and Sexual Activity    Alcohol use: No     Alcohol/week: 0.0 standard drinks of alcohol    Drug use: No    Sexual activity: Yes     Partners: Male     Birth control/protection: None     Comment:  19 years since    Social History Narrative    , twin daughters (1  2018), disabled due to childhood stroke, Druze sophia     Social Determinants of Health     Financial Resource Strain: Low Risk  (2023)    Overall Financial Resource Strain (CARDIA)     Difficulty of  Paying Living Expenses: Not hard at all   Recent Concern: Financial Resource Strain - Medium Risk (9/11/2023)    Overall Financial Resource Strain (CARDIA)     Difficulty of Paying Living Expenses: Somewhat hard   Food Insecurity: No Food Insecurity (12/8/2023)    Hunger Vital Sign     Worried About Running Out of Food in the Last Year: Never true     Ran Out of Food in the Last Year: Never true   Transportation Needs: No Transportation Needs (12/8/2023)    PRAPARE - Transportation     Lack of Transportation (Medical): No     Lack of Transportation (Non-Medical): No   Physical Activity: Insufficiently Active (11/4/2023)    Exercise Vital Sign     Days of Exercise per Week: 4 days     Minutes of Exercise per Session: 20 min   Stress: Stress Concern Present (11/4/2023)    [employer] - Occupational Stress Questionnaire     Feeling of Stress : To some extent   Social Connections: Socially Integrated (12/8/2023)    Social Connection and Isolation Panel [NHANES]     Frequency of Communication with Friends and Family: Three times a week     Frequency of Social Gatherings with Friends and Family: Once a week     Attends Anabaptist Services: 1 to 4 times per year     Active Member of Clubs or Organizations: No     Attends Club or Organization Meetings: 1 to 4 times per year     Marital Status:    Recent Concern: Social Connections - Moderately Isolated (9/11/2023)    Social Connection and Isolation Panel [NHANES]     Frequency of Communication with Friends and Family: Three times a week     Frequency of Social Gatherings with Friends and Family: Three times a week     Attends Anabaptist Services: Never     Active Member of Clubs or Organizations: No     Attends Club or Organization Meetings: Never     Marital Status:    Housing Stability: Low Risk  (12/8/2023)    Housing Stability Vital Sign     Unable to Pay for Housing in the Last Year: No     Number of Places Lived in the Last  Year: 1     Unstable Housing in the Last Year: No     Review of Systems   Constitutional:  Negative for appetite change, chills, diaphoresis, fatigue, fever and unexpected weight change.   HENT:  Negative for congestion, ear pain, hearing loss, sore throat and tinnitus.    Eyes:  Negative for pain, redness and visual disturbance.   Respiratory:  Negative for cough, chest tightness, shortness of breath and wheezing.    Cardiovascular:  Negative for chest pain.   Gastrointestinal:  Negative for abdominal pain, constipation, diarrhea, nausea and vomiting.   Endocrine: Negative for cold intolerance and heat intolerance.   Genitourinary:  Positive for flank pain. Negative for decreased urine volume, difficulty urinating, dysuria, frequency, hematuria and urgency.   Musculoskeletal:  Negative for arthralgias, back pain, myalgias and neck pain.   Skin:  Negative for rash and wound.   Allergic/Immunologic: Negative for environmental allergies, food allergies and immunocompromised state.   Neurological:  Negative for dizziness, facial asymmetry, weakness, light-headedness, numbness and headaches.   Hematological:  Negative for adenopathy. Does not bruise/bleed easily.   Psychiatric/Behavioral:  Negative for agitation, behavioral problems and confusion.      Objective:     Vital Signs (Most Recent):  Temp: 97.8 °F (36.6 °C) (12/08/23 1956)  Pulse: 86 (12/08/23 1956)  Resp: 17 (12/08/23 1956)  BP: (!) 98/54 (12/08/23 1956)  SpO2: 100 % (12/08/23 1956) Vital Signs (24h Range):  Temp:  [97.5 °F (36.4 °C)-98.4 °F (36.9 °C)] 97.8 °F (36.6 °C)  Pulse:  [84-99] 86  Resp:  [17-18] 17  SpO2:  [98 %-100 %] 100 %  BP: ()/(54-62) 98/54     Weight: 83.2 kg (183 lb 6.8 oz)  Body mass index is 28.73 kg/m².    Estimated Creatinine Clearance: 34.9 mL/min (A) (based on SCr of 1.9 mg/dL (H)).     Physical Exam  Constitutional:       General: She is not in acute distress.     Appearance: Normal appearance. She is well-developed. She is not  ill-appearing, toxic-appearing or diaphoretic.       HENT:      Head: Normocephalic and atraumatic.   Cardiovascular:      Rate and Rhythm: Normal rate and regular rhythm.      Heart sounds: Normal heart sounds. No murmur heard.     No friction rub. No gallop.   Pulmonary:      Effort: Pulmonary effort is normal. No respiratory distress.      Breath sounds: Normal breath sounds. No wheezing or rales.   Abdominal:      General: Bowel sounds are normal. There is no distension.      Palpations: Abdomen is soft. There is no mass.      Tenderness: There is no abdominal tenderness. There is no guarding or rebound.   Skin:     General: Skin is warm and dry.   Neurological:      Mental Status: She is alert and oriented to person, place, and time.   Psychiatric:         Behavior: Behavior normal.          Significant Labs: Blood Culture:   Recent Labs   Lab 08/18/23 2043 09/09/23 1806 09/09/23 1807 12/07/23 1953 12/08/23  0155   LABBLOO No growth after 5 days.  No growth after 5 days. No growth after 5 days. No growth after 5 days. No Growth to date  No Growth to date No Growth to date  No Growth to date     CBC:   Recent Labs   Lab 12/07/23  1835 12/07/23  1839 12/08/23  0325   WBC 14.24*  --  13.66*   HGB 15.3  --  12.7   HCT 46.9 50 41.2     --  323     CMP:   Recent Labs   Lab 12/07/23 2027 12/08/23  0325    140   K 4.4 4.2   * 116*   CO2 12* 15*    128*   BUN 37* 39*   CREATININE 1.6* 1.9*   CALCIUM 8.7 9.6   PROT 8.0  --    ALBUMIN 3.1* 3.1*   BILITOT 0.5  --    ALKPHOS 141*  --    AST 33  --    ALT 35  --    ANIONGAP 9 9     Urine Culture:   Recent Labs   Lab 08/18/23  2352 09/09/23  1416 09/11/23  1226 10/21/23  2018 11/03/23  2119   LABURIN Multiple organisms isolated. None in predominance.  Repeat if  clinically necessary. ENTEROBACTER CLOACAE  >100,000 cfu/ml  *  ACINETOBACTER BAUMANNII/HAEMOLYTICUS  50,000 - 99,999 cfu/ml  * ENTEROCOCCUS FAECIUM VRE  10,000 - 49,999 cfu/ml  No  "other significant isolate  * ENTEROCOCCUS FAECALIS  >100,000 cfu/ml  No other significant isolate  * ESCHERICHIA COLI  >100,000 cfu/ml  *     Urine Studies:   Recent Labs   Lab 12/07/23  2131   COLORU [place]   APPEARANCEUA Cloudy*   PHUR 7.0   SPECGRAV 1.015   PROTEINUA 2+*   GLUCUA Negative   KETONESU Negative   BILIRUBINUA Negative   OCCULTUA 1+*   NITRITE Negative   LEUKOCYTESUR 3+*   RBCUA 43*   WBCUA >100*   BACTERIA Many*   SQUAMEPITHEL 4   HYALINECASTS 0     Wound Culture: No results for input(s): "LABAERO" in the last 4320 hours.  All pertinent labs within the past 24 hours have been reviewed.    Significant Imaging: I have reviewed all pertinent imaging results/findings within the past 24 hours.Radiology Results (last 7 days)    ** No results found for the last 168 hours. **     Imaging History       "

## 2023-12-09 NOTE — SUBJECTIVE & OBJECTIVE
Interval History:   No AEON.  Afebrile and WBC WNL  Feeling a little better daily  Cr up to 1.9  The patient denies any recent fever, chills, or sweats.      Review of Systems   Constitutional:  Negative for activity change, chills, diaphoresis and fever.   Respiratory:  Negative for cough, shortness of breath and wheezing.    Cardiovascular:  Negative for chest pain.   Gastrointestinal:  Negative for abdominal pain, constipation, diarrhea, nausea and vomiting.   Genitourinary:  Negative for dysuria, frequency and urgency.   Neurological:  Negative for dizziness.   Hematological:  Does not bruise/bleed easily.     Objective:     Vital Signs (Most Recent):  Temp: 98.3 °F (36.8 °C) (12/09/23 1653)  Pulse: 95 (12/09/23 1653)  Resp: 17 (12/09/23 1653)  BP: (!) 106/57 (12/09/23 1653)  SpO2: 98 % (12/09/23 1653) Vital Signs (24h Range):  Temp:  [97.6 °F (36.4 °C)-98.3 °F (36.8 °C)] 98.3 °F (36.8 °C)  Pulse:  [83-95] 95  Resp:  [17] 17  SpO2:  [95 %-100 %] 98 %  BP: ()/(54-60) 106/57     Weight: 83.2 kg (183 lb 6.8 oz)  Body mass index is 28.73 kg/m².    Estimated Creatinine Clearance: 34.9 mL/min (A) (based on SCr of 1.9 mg/dL (H)).     Physical Exam  Constitutional:       General: She is not in acute distress.     Appearance: Normal appearance. She is well-developed. She is not ill-appearing, toxic-appearing or diaphoretic.       HENT:      Head: Normocephalic and atraumatic.   Cardiovascular:      Rate and Rhythm: Normal rate and regular rhythm.      Heart sounds: Normal heart sounds. No murmur heard.     No friction rub. No gallop.   Pulmonary:      Effort: Pulmonary effort is normal. No respiratory distress.      Breath sounds: Normal breath sounds. No wheezing or rales.   Abdominal:      General: Bowel sounds are normal. There is no distension.      Palpations: Abdomen is soft. There is no mass.      Tenderness: There is no abdominal tenderness. There is no guarding or rebound.   Skin:     General: Skin is warm  and dry.   Neurological:      Mental Status: She is alert and oriented to person, place, and time.   Psychiatric:         Behavior: Behavior normal.          Significant Labs: Blood Culture:   Recent Labs   Lab 08/18/23 2043 09/09/23 1806 09/09/23 1807 12/07/23 1953 12/08/23  0155   LABBLOO No growth after 5 days.  No growth after 5 days. No growth after 5 days. No growth after 5 days. No Growth to date  No Growth to date  No Growth to date  No Growth to date No Growth to date  No Growth to date  No Growth to date  No Growth to date     CBC:   Recent Labs   Lab 12/07/23 1835 12/07/23 1839 12/08/23 0325 12/09/23  0246   WBC 14.24*  --  13.66* 10.81   HGB 15.3  --  12.7 11.9*   HCT 46.9 50 41.2 37.3     --  323 313     CMP:   Recent Labs   Lab 12/07/23 2027 12/08/23 0325 12/09/23  0246    140 140   K 4.4 4.2 3.6   * 116* 115*   CO2 12* 15* 15*    128* 130*   BUN 37* 39* 31*   CREATININE 1.6* 1.9* 1.9*   CALCIUM 8.7 9.6 9.4   PROT 8.0  --   --    ALBUMIN 3.1* 3.1* 3.0*   BILITOT 0.5  --   --    ALKPHOS 141*  --   --    AST 33  --   --    ALT 35  --   --    ANIONGAP 9 9 10     Urine Culture:   Recent Labs   Lab 09/09/23  1416 09/11/23  1226 10/21/23  2018 11/03/23  2119 12/07/23  2131   LABURIN ENTEROBACTER CLOACAE  >100,000 cfu/ml  *  ACINETOBACTER BAUMANNII/HAEMOLYTICUS  50,000 - 99,999 cfu/ml  * ENTEROCOCCUS FAECIUM VRE  10,000 - 49,999 cfu/ml  No other significant isolate  * ENTEROCOCCUS FAECALIS  >100,000 cfu/ml  No other significant isolate  * ESCHERICHIA COLI  >100,000 cfu/ml  * GRAM NEGATIVE BALAJI  >100,000 cfu/ml  Identification and susceptibility pending  *     Urine Studies:   Recent Labs   Lab 12/07/23 2131   COLORU [place]   APPEARANCEUA Cloudy*   PHUR 7.0   SPECGRAV 1.015   PROTEINUA 2+*   GLUCUA Negative   KETONESU Negative   BILIRUBINUA Negative   OCCULTUA 1+*   NITRITE Negative   LEUKOCYTESUR 3+*   RBCUA 43*   WBCUA >100*   BACTERIA Many*   SQUAMEPITHEL 4    HYALINECASTS 0     All pertinent labs within the past 24 hours have been reviewed.    Significant Imaging: I have reviewed all pertinent imaging results/findings within the past 24 hours.  Procedure Component Value Units Date/Time   CT Abdomen Pelvis Without Contrast [[id number]] Resulted: 12/09/23 1516   Order Status: Completed Updated: 12/09/23 1519   Narrative:     EXAMINATION:  CT ABDOMEN PELVIS WITHOUT CONTRAST    CLINICAL HISTORY:  Pyelonephritis ;    TECHNIQUE:  Low dose axial images, sagittal and coronal reformations were obtained from the lung bases to the pubic symphysis.  Oral contrast was not administered.    COMPARISON:  11/03/2023.    FINDINGS:  There are linear opacities within the lung bases consistent with linear atelectasis.  No pleural effusions are identified.  Bony structures appear intact.  There is no evidence for acute fracture or bone destruction.  The liver appears mildly enlarged.  The liver is homogeneous in density with no focal liver lesions identified.  The gallbladder is absent.  The spleen, stomach, and pancreas all appear unremarkable.  The adrenal glands are not enlarged.  There is a left-sided nephrostomy tube present.  There is bilateral hydronephrosis present.  There is a small air bubble identified within the left renal collecting system.  No renal or ureteral calculi are appreciated.  There are surgical changes related to patient's prior creation of transverse colon conduit for urinary diversion.  The abdominal aorta tapers normally without aneurysmal dilatation.  No para-aortic lymphadenopathy is identified.  The patient has bilateral ostomies.  No dilated loops of bowel are evident.  Patient is status post hysterectomy.  No abnormal adnexal masses are appreciated.  The urinary bladder is decompressed.  There is no evidence for pelvic or inguinal lymphadenopathy.   Impression:       Surgical changes with bilateral abdominal ostomies present as before.    Bilateral  hydronephrosis and proximal hydroureter despite the presence of a left sided nephrostomy tube which appears in satisfactory position.  Note the patient has a history of bilateral ureteral obstruction with creation of transverse colon conduit.    Mild hepatomegaly.    S/p hysterectomy.  Status post cholecystectomy.      Electronically signed by: Oj Del Rio MD  Date: 12/09/2023  Time: 15:16   US Retroperitoneal Complete [[id number]] Resulted: 12/09/23 1053   Order Status: Completed Updated: 12/09/23 1055   Narrative:     EXAMINATION:  US RETROPERITONEAL COMPLETE    CLINICAL HISTORY:  ODESSA;    TECHNIQUE:  Ultrasound of the kidneys and urinary bladder was performed including color flow and Doppler evaluation of the kidneys.    COMPARISON:  CT abdomen pelvis 11/03/2023.  Retroperitoneal ultrasound 06/01/2023.    FINDINGS:  Right kidney: The right kidney measures 12.3 cm. No cortical thinning. No loss of corticomedullary distinction. Resistive index measures 0.78.  No mass. No renal stone. Moderate hydronephrosis.    Left kidney: The left kidney measures 12.6 cm.   No cortical thinning. No loss of corticomedullary distinction. Resistive index measures 0.79.  No mass. No renal stone.Left nephrostomy tube.  Minimal residual caliectasis, without overt hydronephrosis.    Reported urostomy.  The bladder is not well visualized.    Splenic resistive index measures 0.56.   Impression:       Moderate right hydronephrosis, similar to prior.    Left nephrostomy.  No overt left hydronephrosis.    Electronically signed by resident: John Juarez  Date: 12/09/2023  Time: 10:14    Electronically signed by: Diomedes Chung Jr  Date: 12/09/2023  Time: 10:53     Imaging History    2023    Date Procedure Name Study Review Link PACS Link Status Accession Number Location   12/09/23 11:20 AM CT Abdomen Pelvis  Without Contrast Study Review  Images Final [id number] [facility]   12/09/23 09:46 AM US Retroperitoneal Complete Study Review  Images  Final [id number] [facility]   12/09/23 02:14 PM CARDIAC MONITORING STRIPS Study Review  Final

## 2023-12-09 NOTE — PLAN OF CARE
Problem: Adult Inpatient Plan of Care  Goal: Plan of Care Review  12/9/2023 0745 by Kiya Dalal RN  Outcome: Ongoing, Progressing  12/9/2023 0745 by Kiya Dalal RN  Outcome: Ongoing, Progressing  Goal: Patient-Specific Goal (Individualized)  12/9/2023 0745 by Kiya Dalal RN  Outcome: Ongoing, Progressing  12/9/2023 0745 by Kiya Dalal RN  Outcome: Ongoing, Progressing  Goal: Absence of Hospital-Acquired Illness or Injury  12/9/2023 0745 by Kiya Dalal RN  Outcome: Ongoing, Progressing  12/9/2023 0745 by Kiya Dalal RN  Outcome: Ongoing, Progressing  Goal: Optimal Comfort and Wellbeing  12/9/2023 0745 by Kiya Dalal RN  Outcome: Ongoing, Progressing  12/9/2023 0745 by Kiya Dalal RN  Outcome: Ongoing, Progressing  Goal: Readiness for Transition of Care  12/9/2023 0745 by Kiya Dalal RN  Outcome: Ongoing, Progressing  12/9/2023 0745 by Kiya Dalal RN  Outcome: Ongoing, Progressing     Problem: Infection  Goal: Absence of Infection Signs and Symptoms  12/9/2023 0745 by Kiya Dalal RN  Outcome: Ongoing, Progressing  12/9/2023 0745 by Kiya Dalal RN  Outcome: Ongoing, Progressing     Problem: Impaired Wound Healing  Goal: Optimal Wound Healing  12/9/2023 0745 by Kiya Dalal RN  Outcome: Ongoing, Progressing  12/9/2023 0745 by Kiya Dalal RN  Outcome: Ongoing, Progressing     Problem: Skin Injury Risk Increased  Goal: Skin Health and Integrity  12/9/2023 0745 by Kiya Dalal RN  Outcome: Ongoing, Progressing  12/9/2023 0745 by Kiya Dalal RN  Outcome: Ongoing, Progressing     Problem: Fall Injury Risk  Goal: Absence of Fall and Fall-Related Injury  12/9/2023 0745 by Kiya Dalal RN  Outcome: Ongoing, Progressing  12/9/2023 0745 by [place], Kiya, RN  Outcome: Ongoing, Progressing     Problem: Pain Acute  Goal: Acceptable Pain Control and Functional Ability  12/9/2023 0745 by Kiya Dalal,  RN  Outcome: Ongoing, Progressing  12/9/2023 0745 by Kiya Dalal RN  Outcome: Ongoing, Progressing     Problem: Fatigue  Goal: Improved Activity Tolerance  12/9/2023 0745 by Kiya Dalal RN  Outcome: Ongoing, Progressing  12/9/2023 0745 by Kiya Dalal RN  Outcome: Ongoing, Progressing     Problem: Adjustment to Illness (Chronic Kidney Disease)  Goal: Optimal Coping with Chronic Illness  12/9/2023 0745 by Kiya Dalal RN  Outcome: Ongoing, Progressing  12/9/2023 0745 by Kiya Dalal RN  Outcome: Ongoing, Progressing     Problem: Electrolyte Imbalance (Chronic Kidney Disease)  Goal: Electrolyte Balance  12/9/2023 0745 by Kiya Dalal RN  Outcome: Ongoing, Progressing  12/9/2023 0745 by Kiya Dalal RN  Outcome: Ongoing, Progressing     Problem: Fluid Volume Excess (Chronic Kidney Disease)  Goal: Fluid Balance  12/9/2023 0745 by Kiya Dalal RN  Outcome: Ongoing, Progressing  12/9/2023 0745 by Kiya Dalal RN  Outcome: Ongoing, Progressing     Problem: Functional Decline (Chronic Kidney Disease)  Goal: Optimal Functional Ability  12/9/2023 0745 by Kiya Dalal RN  Outcome: Ongoing, Progressing  12/9/2023 0745 by Kiya Dalal RN  Outcome: Ongoing, Progressing     Problem: Hematologic Alteration (Chronic Kidney Disease)  Goal: Absence of Anemia Signs and Symptoms  12/9/2023 0745 by Kiya Dalal RN  Outcome: Ongoing, Progressing  12/9/2023 0745 by Kiya Dalal RN  Outcome: Ongoing, Progressing     Problem: Oral Intake Inadequate (Chronic Kidney Disease)  Goal: Optimal Oral Intake  12/9/2023 0745 by Kiya Dalal RN  Outcome: Ongoing, Progressing  12/9/2023 0745 by Kiya Dalal RN  Outcome: Ongoing, Progressing     Problem: Pain (Chronic Kidney Disease)  Goal: Acceptable Pain Control  12/9/2023 0745 by Kiya Dalal RN  Outcome: Ongoing, Progressing  12/9/2023 0745 by Kiya Dalal RN  Outcome: Ongoing, Progressing      Problem: Renal Function Impairment (Chronic Kidney Disease)  Goal: Minimize Renal Failure Effects  12/9/2023 0745 by Kiya Dalal RN  Outcome: Ongoing, Progressing  12/9/2023 0745 by Kiya Dalal RN  Outcome: Ongoing, Progressing     Problem: Hypertension Acute  Goal: Blood Pressure Within Desired Range  12/9/2023 0745 by Kiya Dalal RN  Outcome: Ongoing, Progressing  12/9/2023 0745 by Kiya Dalal RN  Outcome: Ongoing, Progressing     Problem: UTI (Urinary Tract Infection)  Goal: Improved Infection Symptoms  12/9/2023 0745 by Kiya Dalal RN  Outcome: Ongoing, Progressing  12/9/2023 0745 by Kiya Dalal RN  Outcome: Ongoing, Progressing     Problem: Pain Chronic (Persistent) (Comorbidity Management)  Goal: Acceptable Pain Control and Functional Ability  12/9/2023 0745 by Kiya Dalal RN  Outcome: Ongoing, Progressing  12/9/2023 0745 by Kiya Dalal RN  Outcome: Ongoing, Progressing     Problem: Fluid and Electrolyte Imbalance (Acute Kidney Injury/Impairment)  Goal: Fluid and Electrolyte Balance  12/9/2023 0745 by Kiya Dalal RN  Outcome: Ongoing, Progressing  12/9/2023 0745 by Kiya Dalal RN  Outcome: Ongoing, Progressing     Problem: Oral Intake Inadequate (Acute Kidney Injury/Impairment)  Goal: Optimal Nutrition Intake  12/9/2023 0745 by Kiya Dalal RN  Outcome: Ongoing, Progressing  12/9/2023 0745 by Kiya Dalal RN  Outcome: Ongoing, Progressing     Problem: Renal Function Impairment (Acute Kidney Injury/Impairment)  Goal: Effective Renal Function  12/9/2023 0745 by Kiya Dalal RN  Outcome: Ongoing, Progressing  12/9/2023 0745 by Kiya Dalal RN  Outcome: Ongoing, Progressing     Problem: Activity Intolerance  Goal: Enhanced Capacity and Energy  12/9/2023 0745 by Kiya Dalal RN  Outcome: Ongoing, Progressing  12/9/2023 0745 by Kiya Dalal RN  Outcome: Ongoing, Progressing     Problem: Adjustment to Surgery  (Colostomy)  Goal: Optimal Coping  12/9/2023 0745 by Kiya Dalal RN  Outcome: Ongoing, Progressing  12/9/2023 0745 by Kiya Dalal RN  Outcome: Ongoing, Progressing     Problem: Bleeding (Colostomy)  Goal: Absence of Bleeding  12/9/2023 0745 by Kiya Dalal RN  Outcome: Ongoing, Progressing  12/9/2023 0745 by Kiya Dalal RN  Outcome: Ongoing, Progressing     Problem: Fluid, Electrolyte and Nutrition Imbalance (Colostomy)  Goal: Fluid, Electrolyte and Nutrition Balance  12/9/2023 0745 by Kiya Dalal RN  Outcome: Ongoing, Progressing  12/9/2023 0745 by Kiya Dalal RN  Outcome: Ongoing, Progressing     Problem: Infection (Colostomy)  Goal: Absence of Infection Signs and Symptoms  12/9/2023 0745 by Kiya Dalal RN  Outcome: Ongoing, Progressing  12/9/2023 0745 by Kiya Dalal RN  Outcome: Ongoing, Progressing     Problem: Ongoing Anesthesia Effects (Colostomy)  Goal: Anesthesia/Sedation Recovery  12/9/2023 0745 by Kiya Dalal RN  Outcome: Ongoing, Progressing  12/9/2023 0745 by Kiya Dalal RN  Outcome: Ongoing, Progressing     Problem: Pain (Colostomy)  Goal: Acceptable Pain Control  12/9/2023 0745 by Kiya Dalal RN  Outcome: Ongoing, Progressing  12/9/2023 0745 by Kiya Dalal RN  Outcome: Ongoing, Progressing     Problem: Postoperative Nausea and Vomiting (Colostomy)  Goal: Nausea and Vomiting Relief  12/9/2023 0745 by Kiya Dalal RN  Outcome: Ongoing, Progressing  12/9/2023 0745 by Kiya Dalal RN  Outcome: Ongoing, Progressing     Problem: Postoperative Stoma Care (Colostomy)  Goal: Optimal Stoma Healing  12/9/2023 0745 by Kiya Dalal RN  Outcome: Ongoing, Progressing  12/9/2023 0745 by Kiya Dalal RN  Outcome: Ongoing, Progressing     Problem: Postoperative Urinary Retention (Colostomy)  Goal: Effective Urinary Elimination  12/9/2023 0745 by Kiya Dalal, RN  Outcome: Ongoing, Progressing  12/9/2023 0723  by Kiya Dalal RN  Outcome: Ongoing, Progressing     Problem: Respiratory Compromise (Colostomy)  Goal: Effective Oxygenation and Ventilation  12/9/2023 0745 by Kiya Dalal RN  Outcome: Ongoing, Progressing  12/9/2023 0745 by Kiya Dalal RN  Outcome: Ongoing, Progressing     Problem: Adjustment to Surgery (Ileostomy)  Goal: Optimal Coping  12/9/2023 0745 by Kiya Dalal RN  Outcome: Ongoing, Progressing  12/9/2023 0745 by Kiya Dalal RN  Outcome: Ongoing, Progressing     Problem: Bleeding (Ileostomy)  Goal: Absence of Bleeding  12/9/2023 0745 by Kiya Dalal RN  Outcome: Ongoing, Progressing  12/9/2023 0745 by Kiya Dalal RN  Outcome: Ongoing, Progressing     Problem: Fluid, Electrolyte and Nutrition Imbalance (Ileostomy)  Goal: Fluid, Electrolyte and Nutrition Balance  12/9/2023 0745 by Kiya Dalal RN  Outcome: Ongoing, Progressing  12/9/2023 0745 by Kiya Dalal RN  Outcome: Ongoing, Progressing     Problem: Infection (Ileostomy)  Goal: Absence of Infection Signs and Symptoms  12/9/2023 0745 by Kiya Dalal RN  Outcome: Ongoing, Progressing  12/9/2023 0745 by Kiya Dalal RN  Outcome: Ongoing, Progressing

## 2023-12-09 NOTE — ASSESSMENT & PLAN NOTE
ODESSA on CKD   BMP reviewed- noted Estimated Creatinine Clearance: 34.9 mL/min (A) (based on SCr of 1.9 mg/dL (H)). according to latest data. Based on current GFR, CKD stage is stage 3 - GFR 30-59.  Monitor UOP and serial BMP and adjust therapy as needed. Renally dose meds. Avoid nephrotoxic medications and procedures.    Cr 1.9, baseline 1.4  Likely iso of pyelonephritis vs dehydration   FeNA 0.2%, Start gentle IVF hydration  Abx as above

## 2023-12-09 NOTE — HPI
59 y/o AAF hx of Cervical Cancer s/p XRT & Bilateral Ureteral strictures with obstructive uropathy now s/p transverse colon conduit, s/p Left Nephrostomy tube, s/p Ileostomy, hx of MDR UTI/pyelonephritis presents to the ED with complaints of abdominal pain.  Since Monday 12/4, she has had recurrent nausea vomiting over 3 days, poor diet/oral intake with associated weakness.      2 recent hospitalizations with left sided pyelonephritis, prior urine cultures show different MDR bacteria that have grown.  She is pending evaluation by colo-rectal surgery for outpatient revision of urostomy, follows with Dr. Balbuena (urology)      Reported having epigastric pain 10/10 on arrival in ED  9/10 only mild relief with initial morphine dosage.      Patient admitted and UA/UCx obtained.  UA with WBC >100, 3+ leukocytes.  Started on cefepime.  Blood cultures NGTD. WBC 13-14.  ID consulted for abx recs.  No imaging done.    Patient reports feeling a little better.  Still with L sided back pain.  The patient denies any recent fever, chills, or sweats.  N/V resolved.

## 2023-12-09 NOTE — PROGRESS NOTES
[facility]  Infectious Disease  Progress Note    Patient Name: Edita Riley  MRN: [id number]  Admission Date: 12/7/2023  Length of Stay: 1 days  Attending Physician: Jamar Campbell MD  Primary Care Provider: Trina Vu MD    Isolation Status: No active isolations  Assessment/Plan:      ID  * Pyelonephritis  59 yo female with Hx of Cervical Cancer s/p XRT & Bilateral Ureteral strictures with obstructive uropathy now s/p transverse colon conduit, s/p Left Nephrostomy tube, s/p Ileostomy, hx of MDR UTI/pyelonephritis presents to the ED with complaints of abdominal pain and cf pyelonephritis.  UA + and UCX with GNR from L nephrostomy tue.  Blood cultures NGTD.  On cefepime.  Feels a little improved.  CT ABD/PEelvis with stable hydronephrosis BL but no mention perinephric fat stranding.  The patient denies any recent fever, chills, or sweats.      Plan:  Continue cefepime  FU urine cultures  Will need L PCNT exchanged once cultures known and abx tailored  Discussed with ID staff  Will follow.           Anticipated Disposition: tbd    Thank you for your consult. I will follow-up with patient. Please contact us if you have any additional questions.    ADRIAN Armenta  Infectious Disease  [facility]    Subjective:     Principal Problem:Pyelonephritis    HPI: 57 y/o AAF hx of Cervical Cancer s/p XRT & Bilateral Ureteral strictures with obstructive uropathy now s/p transverse colon conduit, s/p Left Nephrostomy tube, s/p Ileostomy, hx of MDR UTI/pyelonephritis presents to the ED with complaints of abdominal pain.  Since Monday 12/4, she has had recurrent nausea vomiting over 3 days, poor diet/oral intake with associated weakness.      2 recent hospitalizations with left sided pyelonephritis, prior urine cultures show different MDR bacteria that have grown.  She is pending evaluation by colo-rectal surgery for outpatient revision of urostomy, follows with Dr. Balbuena  (urology)      Reported having epigastric pain 10/10 on arrival in ED  9/10 only mild relief with initial morphine dosage.      Patient admitted and UA/UCx obtained.  UA with WBC >100, 3+ leukocytes.  Started on cefepime.  Blood cultures NGTD. WBC 13-14.  ID consulted for abx recs.  No imaging done.    Patient reports feeling a little better.  Still with L sided back pain.  The patient denies any recent fever, chills, or sweats.  N/V resolved.    Interval History:   No AEON.  Afebrile and WBC WNL  Feeling a little better daily  Cr up to 1.9  The patient denies any recent fever, chills, or sweats.      Review of Systems   Constitutional:  Negative for activity change, chills, diaphoresis and fever.   Respiratory:  Negative for cough, shortness of breath and wheezing.    Cardiovascular:  Negative for chest pain.   Gastrointestinal:  Negative for abdominal pain, constipation, diarrhea, nausea and vomiting.   Genitourinary:  Negative for dysuria, frequency and urgency.   Neurological:  Negative for dizziness.   Hematological:  Does not bruise/bleed easily.     Objective:     Vital Signs (Most Recent):  Temp: 98.3 °F (36.8 °C) (12/09/23 1653)  Pulse: 95 (12/09/23 1653)  Resp: 17 (12/09/23 1653)  BP: (!) 106/57 (12/09/23 1653)  SpO2: 98 % (12/09/23 1653) Vital Signs (24h Range):  Temp:  [97.6 °F (36.4 °C)-98.3 °F (36.8 °C)] 98.3 °F (36.8 °C)  Pulse:  [83-95] 95  Resp:  [17] 17  SpO2:  [95 %-100 %] 98 %  BP: ()/(54-60) 106/57     Weight: 83.2 kg (183 lb 6.8 oz)  Body mass index is 28.73 kg/m².    Estimated Creatinine Clearance: 34.9 mL/min (A) (based on SCr of 1.9 mg/dL (H)).     Physical Exam  Constitutional:       General: She is not in acute distress.     Appearance: Normal appearance. She is well-developed. She is not ill-appearing, toxic-appearing or diaphoretic.       HENT:      Head: Normocephalic and atraumatic.   Cardiovascular:      Rate and Rhythm: Normal rate and regular rhythm.      Heart sounds: Normal  heart sounds. No murmur heard.     No friction rub. No gallop.   Pulmonary:      Effort: Pulmonary effort is normal. No respiratory distress.      Breath sounds: Normal breath sounds. No wheezing or rales.   Abdominal:      General: Bowel sounds are normal. There is no distension.      Palpations: Abdomen is soft. There is no mass.      Tenderness: There is no abdominal tenderness. There is no guarding or rebound.   Skin:     General: Skin is warm and dry.   Neurological:      Mental Status: She is alert and oriented to person, place, and time.   Psychiatric:         Behavior: Behavior normal.          Significant Labs: Blood Culture:   Recent Labs   Lab 08/18/23 2043 09/09/23 1806 09/09/23 1807 12/07/23 1953 12/08/23  0155   LABBLOO No growth after 5 days.  No growth after 5 days. No growth after 5 days. No growth after 5 days. No Growth to date  No Growth to date  No Growth to date  No Growth to date No Growth to date  No Growth to date  No Growth to date  No Growth to date     CBC:   Recent Labs   Lab 12/07/23 1835 12/07/23 1839 12/08/23 0325 12/09/23  0246   WBC 14.24*  --  13.66* 10.81   HGB 15.3  --  12.7 11.9*   HCT 46.9 50 41.2 37.3     --  323 313     CMP:   Recent Labs   Lab 12/07/23 2027 12/08/23 0325 12/09/23  0246    140 140   K 4.4 4.2 3.6   * 116* 115*   CO2 12* 15* 15*    128* 130*   BUN 37* 39* 31*   CREATININE 1.6* 1.9* 1.9*   CALCIUM 8.7 9.6 9.4   PROT 8.0  --   --    ALBUMIN 3.1* 3.1* 3.0*   BILITOT 0.5  --   --    ALKPHOS 141*  --   --    AST 33  --   --    ALT 35  --   --    ANIONGAP 9 9 10     Urine Culture:   Recent Labs   Lab 09/09/23  1416 09/11/23  1226 10/21/23  2018 11/03/23  2119 12/07/23 2131   LABURIN ENTEROBACTER CLOACAE  >100,000 cfu/ml  *  ACINETOBACTER BAUMANNII/HAEMOLYTICUS  50,000 - 99,999 cfu/ml  * ENTEROCOCCUS FAECIUM VRE  10,000 - 49,999 cfu/ml  No other significant isolate  * ENTEROCOCCUS FAECALIS  >100,000 cfu/ml  No other  significant isolate  * ESCHERICHIA COLI  >100,000 cfu/ml  * GRAM NEGATIVE BALAJI  >100,000 cfu/ml  Identification and susceptibility pending  *     Urine Studies:   Recent Labs   Lab 12/07/23 2131   COLORU [place]   APPEARANCEUA Cloudy*   PHUR 7.0   SPECGRAV 1.015   PROTEINUA 2+*   GLUCUA Negative   KETONESU Negative   BILIRUBINUA Negative   OCCULTUA 1+*   NITRITE Negative   LEUKOCYTESUR 3+*   RBCUA 43*   WBCUA >100*   BACTERIA Many*   SQUAMEPITHEL 4   HYALINECASTS 0     All pertinent labs within the past 24 hours have been reviewed.    Significant Imaging: I have reviewed all pertinent imaging results/findings within the past 24 hours.  Procedure Component Value Units Date/Time   CT Abdomen Pelvis Without Contrast [[id number]] Resulted: 12/09/23 1516   Order Status: Completed Updated: 12/09/23 1519   Narrative:     EXAMINATION:  CT ABDOMEN PELVIS WITHOUT CONTRAST    CLINICAL HISTORY:  Pyelonephritis ;    TECHNIQUE:  Low dose axial images, sagittal and coronal reformations were obtained from the lung bases to the pubic symphysis.  Oral contrast was not administered.    COMPARISON:  11/03/2023.    FINDINGS:  There are linear opacities within the lung bases consistent with linear atelectasis.  No pleural effusions are identified.  Bony structures appear intact.  There is no evidence for acute fracture or bone destruction.  The liver appears mildly enlarged.  The liver is homogeneous in density with no focal liver lesions identified.  The gallbladder is absent.  The spleen, stomach, and pancreas all appear unremarkable.  The adrenal glands are not enlarged.  There is a left-sided nephrostomy tube present.  There is bilateral hydronephrosis present.  There is a small air bubble identified within the left renal collecting system.  No renal or ureteral calculi are appreciated.  There are surgical changes related to patient's prior creation of transverse colon conduit for urinary diversion.  The abdominal aorta tapers normally  without aneurysmal dilatation.  No para-aortic lymphadenopathy is identified.  The patient has bilateral ostomies.  No dilated loops of bowel are evident.  Patient is status post hysterectomy.  No abnormal adnexal masses are appreciated.  The urinary bladder is decompressed.  There is no evidence for pelvic or inguinal lymphadenopathy.   Impression:       Surgical changes with bilateral abdominal ostomies present as before.    Bilateral hydronephrosis and proximal hydroureter despite the presence of a left sided nephrostomy tube which appears in satisfactory position.  Note the patient has a history of bilateral ureteral obstruction with creation of transverse colon conduit.    Mild hepatomegaly.    S/p hysterectomy.  Status post cholecystectomy.      Electronically signed by: Oj Del Rio MD  Date: 12/09/2023  Time: 15:16   US Retroperitoneal Complete [[id number]] Resulted: 12/09/23 1053   Order Status: Completed Updated: 12/09/23 1055   Narrative:     EXAMINATION:  US RETROPERITONEAL COMPLETE    CLINICAL HISTORY:  ODESSA;    TECHNIQUE:  Ultrasound of the kidneys and urinary bladder was performed including color flow and Doppler evaluation of the kidneys.    COMPARISON:  CT abdomen pelvis 11/03/2023.  Retroperitoneal ultrasound 06/01/2023.    FINDINGS:  Right kidney: The right kidney measures 12.3 cm. No cortical thinning. No loss of corticomedullary distinction. Resistive index measures 0.78.  No mass. No renal stone. Moderate hydronephrosis.    Left kidney: The left kidney measures 12.6 cm.   No cortical thinning. No loss of corticomedullary distinction. Resistive index measures 0.79.  No mass. No renal stone.Left nephrostomy tube.  Minimal residual caliectasis, without overt hydronephrosis.    Reported urostomy.  The bladder is not well visualized.    Splenic resistive index measures 0.56.   Impression:       Moderate right hydronephrosis, similar to prior.    Left nephrostomy.  No overt left  hydronephrosis.    Electronically signed by resident: John Juarez  Date: 12/09/2023  Time: 10:14    Electronically signed by: Diomedes Chung Jr  Date: 12/09/2023  Time: 10:53     Imaging History    2023    Date Procedure Name Study Review Link PACS Link Status Accession Number Location   12/09/23 11:20 AM CT Abdomen Pelvis  Without Contrast Study Review  Images Final [id number] [facility]   12/09/23 09:46 AM US Retroperitoneal Complete Study Review  Images Final [id number] [facility]   12/09/23 02:14 PM CARDIAC MONITORING STRIPS Study Review  Final

## 2023-12-09 NOTE — PROGRESS NOTES
[facility] [address] [place] [address] Medicine  Progress Note    Patient Name: Edita Riley  MRN: [id number]  Patient Class: IP- Inpatient   Admission Date: 12/7/2023  Length of Stay: 1 days  Attending Physician: Jamar Campbell MD  Primary Care Provider: Trina Vu MD        Subjective:     Principal Problem:Pyelonephritis        HPI:  57 y/o AAF hx of Cervical Cancer s/p XRT & Bilateral Ureteral strictures with obstructive uropathy now s/p transverse colon conduit, s/p Left Nephrostomy tube, s/p Ileostomy, hx of MDR UTI/pyelonephritis presents to the ED with complaints of abdominal pain.  She reports since Monday 12/4, she has had recurrent nausea vomiting over 3 days, poor diet/oral intake with associated weakness.     2 recent hospitalizations with left sided pyelonephritis, prior urine cultures show different MDR bacteria that have grown.  She is pending evaluation by colo-rectal surgery for outpatient revision of urostomy, follows with Dr. Balbuena (urology)     Reports having epigastric pain 10/10 on arrival,  9/10 during interview, only mild relief with initial morphine dosage.   She does not note any change in the ileostomy stool output, no reported change in amount or quality from her urostomy or her left nephrostomy tube. She denies any fevers of chills.     ED Treatment: Morphine 4mg IV,  Zofran 4mg IV, NS 1Liter    Overview/Hospital Course:  No notes on file    Interval History:   Urine cx growing GNR, ID following. Continue cefepime.. Obtain CT A/P w/o contrast. Cr 1.9 , baseline 1.3, FeNa 0.2%. Start gentle IVF hydration. Encourage PO hydration        Objective:     Vital Signs (Most Recent):  Temp: 97.6 °F (36.4 °C) (12/09/23 0827)  Pulse: 86 (12/09/23 0827)  Resp: 17 (12/09/23 0827)  BP: 103/60 (12/09/23 0827)  SpO2: 96 % (12/09/23 0827) Vital Signs (24h Range):  Temp:  [97.5 °F (36.4 °C)-98.2 °F (36.8 °C)] 97.6 °F (36.4 °C)  Pulse:  [84-99] 86  Resp:  [17-18] 17  SpO2:  [95 %-100 %]  96 %  BP: ()/(54-62) 103/60     Weight: 83.2 kg (183 lb 6.8 oz)  Body mass index is 28.73 kg/m².    Intake/Output Summary (Last 24 hours) at 12/9/2023 1016  Last data filed at 12/9/2023 0055  Gross per 24 hour   Intake --   Output 650 ml   Net -650 ml         Physical Exam  Vitals and nursing note reviewed.   Constitutional:       Appearance: She is ill-appearing.   Eyes:      General: No scleral icterus.     Pupils: Pupils are equal, round, and reactive to light.   Cardiovascular:      Rate and Rhythm: Normal rate and regular rhythm.      Pulses: Normal pulses.   Pulmonary:      Effort: Pulmonary effort is normal. No respiratory distress.      Breath sounds: No wheezing.   Abdominal:      Tenderness: There is abdominal tenderness (epigastric).      Comments: RLQ Ileostomy with brown liquid stool output, surrounding abdominal wall near ileostomy with erythema/hyperpigmented appearance.     LUQ urostomy  Left Flank nephrostomy tube   Skin:     General: Skin is warm and dry.   Neurological:      General: No focal deficit present.      Mental Status: She is alert.             Assessment/Plan:      * Pyelonephritis  -patient with hx of MDRO and VRE and recent bouts pyelonephritis, with similar symptoms, epigastric tenderness, leukocytosis, pyuria  -ID Consulted. Continue cefepime.  -Urine cx growing GNR  -Blood cx with NGTD  -Obtain CT A/P w/o contrast     -PRN  analgesics    Metabolic acidosis, NAG, bicarbonate losses  Patient with ileostomy, patient reporting increased output recently  -Continue home oral sodium bicarbonate     Azotemia  Mild creatinine elevation but rising BUN, suspect a pre-renal etiology related to nausea/vomiting and decreased oral intake  -Trend Renal function panel      Essential hypertension  patient is not on any chronic oral therapy at this time    CKD (chronic kidney disease), stage III  ODESSA on CKD   BMP reviewed- noted Estimated Creatinine Clearance: 34.9 mL/min (A) (based on SCr of  1.9 mg/dL (H)). according to latest data. Based on current GFR, CKD stage is stage 3 - GFR 30-59.  Monitor UOP and serial BMP and adjust therapy as needed. Renally dose meds. Avoid nephrotoxic medications and procedures.    Cr 1.9, baseline 1.4  Likely iso of pyelonephritis vs dehydration   FeNA 0.2%, Start gentle IVF hydration  Abx as above    Nephrostomy status  Left Nephrostomy draining urine- if any reduced UOp or ODESSA developing - obtain renal imaging to r/o nephrostomy tube dysfunction      Presence of urostomy  Consult wound care re: urostomy care supplies while patient is admitted      History of DVT (deep vein thrombosis)  -not on chronic anticoagulation  -continue sq enoxaparin DVT ppx      Ileostomy in place  Consult wound care re: ileostomy care supplies while patient is admitted        VTE Risk Mitigation (From admission, onward)           Ordered     enoxaparin injection 40 mg  Daily         12/08/23 0225     IP VTE HIGH RISK PATIENT  Once         12/08/23 0225     Place sequential compression device  Until discontinued         12/08/23 0225                    Discharge Planning   OK: 12/9/2023     Code Status: Full Code   Is the patient medically ready for discharge?:     Reason for patient still in hospital (select all that apply): Patient trending condition  Discharge Plan A: Home, Home with family                  Jamar Campbell MD  Department of Hospital Medicine   [facility]

## 2023-12-09 NOTE — ASSESSMENT & PLAN NOTE
57 yo female with Hx of Cervical Cancer s/p XRT & Bilateral Ureteral strictures with obstructive uropathy now s/p transverse colon conduit, s/p Left Nephrostomy tube, s/p Ileostomy, hx of MDR UTI/pyelonephritis presents to the ED with complaints of abdominal pain and cf pyelonephritis.  UA +.  Blood cultures NGTD.  On cefepime.  No imaging availble.  Feels a little improved.  The patient denies any recent fever, chills, or sweats.      Plan:  Continue cefepime  FU urine cultures  Rec CT ABD/Pelvis w/out contrast  Discussed with ID staff  Will follow.

## 2023-12-09 NOTE — SUBJECTIVE & OBJECTIVE
Interval History:   Urine cx growing GNR, ID following. Continue cefepime.. Obtain CT A/P w/o contrast. Cr 1.9 , baseline 1.3, FeNa 0.2%. Start gentle IVF hydration. Encourage PO hydration        Objective:     Vital Signs (Most Recent):  Temp: 97.6 °F (36.4 °C) (12/09/23 0827)  Pulse: 86 (12/09/23 0827)  Resp: 17 (12/09/23 0827)  BP: 103/60 (12/09/23 0827)  SpO2: 96 % (12/09/23 0827) Vital Signs (24h Range):  Temp:  [97.5 °F (36.4 °C)-98.2 °F (36.8 °C)] 97.6 °F (36.4 °C)  Pulse:  [84-99] 86  Resp:  [17-18] 17  SpO2:  [95 %-100 %] 96 %  BP: ()/(54-62) 103/60     Weight: 83.2 kg (183 lb 6.8 oz)  Body mass index is 28.73 kg/m².    Intake/Output Summary (Last 24 hours) at 12/9/2023 1016  Last data filed at 12/9/2023 0055  Gross per 24 hour   Intake --   Output 650 ml   Net -650 ml         Physical Exam  Vitals and nursing note reviewed.   Constitutional:       Appearance: She is ill-appearing.   Eyes:      General: No scleral icterus.     Pupils: Pupils are equal, round, and reactive to light.   Cardiovascular:      Rate and Rhythm: Normal rate and regular rhythm.      Pulses: Normal pulses.   Pulmonary:      Effort: Pulmonary effort is normal. No respiratory distress.      Breath sounds: No wheezing.   Abdominal:      Tenderness: There is abdominal tenderness (epigastric).      Comments: RLQ Ileostomy with brown liquid stool output, surrounding abdominal wall near ileostomy with erythema/hyperpigmented appearance.     LUQ urostomy  Left Flank nephrostomy tube   Skin:     General: Skin is warm and dry.   Neurological:      General: No focal deficit present.      Mental Status: She is alert.

## 2023-12-10 LAB
ALBUMIN SERPL BCP-MCNC: 2.8 G/DL (ref 3.5–5.2)
ANION GAP SERPL CALC-SCNC: 9 MMOL/L (ref 8–16)
BASOPHILS # BLD AUTO: 0.04 K/UL (ref 0–0.2)
BASOPHILS NFR BLD: 0.6 % (ref 0–1.9)
BUN SERPL-MCNC: 22 MG/DL (ref 6–20)
CALCIUM SERPL-MCNC: 9.4 MG/DL (ref 8.7–10.5)
CHLORIDE SERPL-SCNC: 115 MMOL/L (ref 95–110)
CO2 SERPL-SCNC: 16 MMOL/L (ref 23–29)
CREAT SERPL-MCNC: 1.6 MG/DL (ref 0.5–1.4)
DIFFERENTIAL METHOD: ABNORMAL
EOSINOPHIL # BLD AUTO: 0.3 K/UL (ref 0–0.5)
EOSINOPHIL NFR BLD: 3.8 % (ref 0–8)
ERYTHROCYTE [DISTWIDTH] IN BLOOD BY AUTOMATED COUNT: 15 % (ref 11.5–14.5)
EST. GFR  (NO RACE VARIABLE): 36.7 ML/MIN/1.73 M^2
GLUCOSE SERPL-MCNC: 89 MG/DL (ref 70–110)
HCT VFR BLD AUTO: 36.6 % (ref 37–48.5)
HGB BLD-MCNC: 11.4 G/DL (ref 12–16)
IMM GRANULOCYTES # BLD AUTO: 0.02 K/UL (ref 0–0.04)
IMM GRANULOCYTES NFR BLD AUTO: 0.3 % (ref 0–0.5)
LYMPHOCYTES # BLD AUTO: 1.7 K/UL (ref 1–4.8)
LYMPHOCYTES NFR BLD: 24 % (ref 18–48)
MAGNESIUM SERPL-MCNC: 2 MG/DL (ref 1.6–2.6)
MCH RBC QN AUTO: 28.6 PG (ref 27–31)
MCHC RBC AUTO-ENTMCNC: 31.1 G/DL (ref 32–36)
MCV RBC AUTO: 92 FL (ref 82–98)
MONOCYTES # BLD AUTO: 1.2 K/UL (ref 0.3–1)
MONOCYTES NFR BLD: 16.7 % (ref 4–15)
NEUTROPHILS # BLD AUTO: 3.8 K/UL (ref 1.8–7.7)
NEUTROPHILS NFR BLD: 54.6 % (ref 38–73)
NRBC BLD-RTO: 0 /100 WBC
PHOSPHATE SERPL-MCNC: 2.7 MG/DL (ref 2.7–4.5)
PLATELET # BLD AUTO: 312 K/UL (ref 150–450)
PMV BLD AUTO: 10.3 FL (ref 9.2–12.9)
POTASSIUM SERPL-SCNC: 3.5 MMOL/L (ref 3.5–5.1)
RBC # BLD AUTO: 3.99 M/UL (ref 4–5.4)
SODIUM SERPL-SCNC: 140 MMOL/L (ref 136–145)
WBC # BLD AUTO: 6.88 K/UL (ref 3.9–12.7)

## 2023-12-10 PROCEDURE — 80069 RENAL FUNCTION PANEL: CPT | Performed by: HOSPITALIST

## 2023-12-10 PROCEDURE — 36415 COLL VENOUS BLD VENIPUNCTURE: CPT | Performed by: HOSPITALIST

## 2023-12-10 PROCEDURE — 83735 ASSAY OF MAGNESIUM: CPT | Performed by: HOSPITALIST

## 2023-12-10 PROCEDURE — 99232 PR SUBSEQUENT HOSPITAL CARE,LEVL II: ICD-10-PCS | Mod: ,,, | Performed by: PHYSICIAN ASSISTANT

## 2023-12-10 PROCEDURE — 63600175 PHARM REV CODE 636 W HCPCS: Performed by: STUDENT IN AN ORGANIZED HEALTH CARE EDUCATION/TRAINING PROGRAM

## 2023-12-10 PROCEDURE — 85025 COMPLETE CBC W/AUTO DIFF WBC: CPT | Performed by: HOSPITALIST

## 2023-12-10 PROCEDURE — 99232 SBSQ HOSP IP/OBS MODERATE 35: CPT | Mod: ,,, | Performed by: PHYSICIAN ASSISTANT

## 2023-12-10 PROCEDURE — 63600175 PHARM REV CODE 636 W HCPCS: Performed by: HOSPITALIST

## 2023-12-10 PROCEDURE — 21400001 HC TELEMETRY ROOM

## 2023-12-10 PROCEDURE — 25000003 PHARM REV CODE 250: Performed by: HOSPITALIST

## 2023-12-10 RX ORDER — SODIUM CHLORIDE, SODIUM LACTATE, POTASSIUM CHLORIDE, CALCIUM CHLORIDE 600; 310; 30; 20 MG/100ML; MG/100ML; MG/100ML; MG/100ML
INJECTION, SOLUTION INTRAVENOUS CONTINUOUS
Status: ACTIVE | OUTPATIENT
Start: 2023-12-10 | End: 2023-12-10

## 2023-12-10 RX ADMIN — CEFEPIME 1 G: 1 INJECTION, POWDER, FOR SOLUTION INTRAMUSCULAR; INTRAVENOUS at 12:12

## 2023-12-10 RX ADMIN — MIRTAZAPINE 30 MG: 15 TABLET, FILM COATED ORAL at 09:12

## 2023-12-10 RX ADMIN — SODIUM BICARBONATE 650 MG TABLET 650 MG: at 09:12

## 2023-12-10 RX ADMIN — CEFEPIME 1 G: 1 INJECTION, POWDER, FOR SOLUTION INTRAMUSCULAR; INTRAVENOUS at 11:12

## 2023-12-10 RX ADMIN — SODIUM BICARBONATE 650 MG TABLET 650 MG: at 03:12

## 2023-12-10 RX ADMIN — MICONAZOLE NITRATE: 20 POWDER TOPICAL at 09:12

## 2023-12-10 RX ADMIN — SODIUM CHLORIDE, POTASSIUM CHLORIDE, SODIUM LACTATE AND CALCIUM CHLORIDE: 600; 310; 30; 20 INJECTION, SOLUTION INTRAVENOUS at 09:12

## 2023-12-10 RX ADMIN — ENOXAPARIN SODIUM 40 MG: 40 INJECTION SUBCUTANEOUS at 04:12

## 2023-12-10 NOTE — ASSESSMENT & PLAN NOTE
59 yo female with Hx of Cervical Cancer s/p XRT & Bilateral Ureteral strictures with obstructive uropathy now s/p transverse colon conduit, s/p Left Nephrostomy tube, s/p Ileostomy, hx of MDR UTI/pyelonephritis presents to the ED with complaints of abdominal pain and cf pyelonephritis.  UA + and UCX with GNR from L nephrostomy tue.  Blood cultures NGTD.  On cefepime.  Feels a little improved.  CT ABD/PEelvis with stable hydronephrosis BL but no mention perinephric fat stranding.      Urine cultures being re-run in micro lab.  Patient stable and WBC now normalized while on cefepime.  Clinically improved. The patient denies any recent fever, chills, or sweats.    Plan:  Continue cefepime  FU urine cultures  Will need L PCNT exchanged once cultures known and abx tailored  Will follow.

## 2023-12-10 NOTE — SUBJECTIVE & OBJECTIVE
Interval History:   No AEON.  Afebrile and WBC WNL now  Feeling a little better daily  Cr improved to 1.6  Feels 50% better  The patient denies any recent fever, chills, or sweats.      Review of Systems   Constitutional:  Negative for activity change, chills, diaphoresis and fever.   Respiratory:  Negative for cough, shortness of breath and wheezing.    Cardiovascular:  Negative for chest pain.   Gastrointestinal:  Negative for abdominal pain, constipation, diarrhea, nausea and vomiting.   Genitourinary:  Negative for dysuria, frequency and urgency.   Neurological:  Negative for dizziness.   Hematological:  Does not bruise/bleed easily.     Objective:     Vital Signs (Most Recent):  Temp: 97.5 °F (36.4 °C) (12/10/23 0803)  Pulse: 71 (12/10/23 0803)  Resp: 18 (12/10/23 0803)  BP: (!) 112/56 (12/10/23 0803)  SpO2: 100 % (12/10/23 0803) Vital Signs (24h Range):  Temp:  [97.5 °F (36.4 °C)-98.3 °F (36.8 °C)] 97.5 °F (36.4 °C)  Pulse:  [67-95] 71  Resp:  [17-18] 18  SpO2:  [98 %-100 %] 100 %  BP: (103-112)/(55-60) 112/56     Weight: 81 kg (178 lb 9.2 oz)  Body mass index is 27.97 kg/m².    Estimated Creatinine Clearance: 41 mL/min (A) (based on SCr of 1.6 mg/dL (H)).     Physical Exam  Constitutional:       General: She is not in acute distress.     Appearance: Normal appearance. She is well-developed. She is not ill-appearing, toxic-appearing or diaphoretic.       HENT:      Head: Normocephalic and atraumatic.   Cardiovascular:      Rate and Rhythm: Normal rate and regular rhythm.      Heart sounds: Normal heart sounds. No murmur heard.     No friction rub. No gallop.   Pulmonary:      Effort: Pulmonary effort is normal. No respiratory distress.      Breath sounds: Normal breath sounds. No wheezing or rales.   Abdominal:      General: Bowel sounds are normal. There is no distension.      Palpations: Abdomen is soft. There is no mass.      Tenderness: There is no abdominal tenderness. There is no guarding or rebound.    Skin:     General: Skin is warm and dry.   Neurological:      Mental Status: She is alert and oriented to person, place, and time.   Psychiatric:         Behavior: Behavior normal.          Significant Labs: Blood Culture:   Recent Labs   Lab 08/18/23 2043 09/09/23  1806 09/09/23 1807 12/07/23 1953 12/08/23  0155   LABBLOO No growth after 5 days.  No growth after 5 days. No growth after 5 days. No growth after 5 days. No Growth to date  No Growth to date  No Growth to date  No Growth to date  No Growth to date  No Growth to date No Growth to date  No Growth to date  No Growth to date  No Growth to date  No Growth to date  No Growth to date       CBC:   Recent Labs   Lab 12/09/23  0246 12/10/23  0355   WBC 10.81 6.88   HGB 11.9* 11.4*   HCT 37.3 36.6*    312       CMP:   Recent Labs   Lab 12/09/23  0246 12/10/23  0355    140   K 3.6 3.5   * 115*   CO2 15* 16*   * 89   BUN 31* 22*   CREATININE 1.9* 1.6*   CALCIUM 9.4 9.4   ALBUMIN 3.0* 2.8*   ANIONGAP 10 9       Urine Culture:   Recent Labs   Lab 09/09/23  1416 09/11/23  1226 10/21/23  2018 11/03/23  2119 12/07/23  2131   LABURIN ENTEROBACTER CLOACAE  >100,000 cfu/ml  *  ACINETOBACTER BAUMANNII/HAEMOLYTICUS  50,000 - 99,999 cfu/ml  * ENTEROCOCCUS FAECIUM VRE  10,000 - 49,999 cfu/ml  No other significant isolate  * ENTEROCOCCUS FAECALIS  >100,000 cfu/ml  No other significant isolate  * ESCHERICHIA COLI  >100,000 cfu/ml  * GRAM NEGATIVE BALAJI  >100,000 cfu/ml  Identification and susceptibility pending  *       Urine Studies:   Recent Labs   Lab 12/07/23 2131   COLORU [place]   APPEARANCEUA Cloudy*   PHUR 7.0   SPECGRAV 1.015   PROTEINUA 2+*   GLUCUA Negative   KETONESU Negative   BILIRUBINUA Negative   OCCULTUA 1+*   NITRITE Negative   LEUKOCYTESUR 3+*   RBCUA 43*   WBCUA >100*   BACTERIA Many*   SQUAMEPITHEL 4   HYALINECASTS 0       All pertinent labs within the past 24 hours have been reviewed.    Significant Imaging: I have  reviewed all pertinent imaging results/findings within the past 24 hours.  Procedure Component Value Units Date/Time   CT Abdomen Pelvis Without Contrast [[id number]] Resulted: 12/09/23 1516   Order Status: Completed Updated: 12/09/23 1519   Narrative:     EXAMINATION:  CT ABDOMEN PELVIS WITHOUT CONTRAST    CLINICAL HISTORY:  Pyelonephritis ;    TECHNIQUE:  Low dose axial images, sagittal and coronal reformations were obtained from the lung bases to the pubic symphysis.  Oral contrast was not administered.    COMPARISON:  11/03/2023.    FINDINGS:  There are linear opacities within the lung bases consistent with linear atelectasis.  No pleural effusions are identified.  Bony structures appear intact.  There is no evidence for acute fracture or bone destruction.  The liver appears mildly enlarged.  The liver is homogeneous in density with no focal liver lesions identified.  The gallbladder is absent.  The spleen, stomach, and pancreas all appear unremarkable.  The adrenal glands are not enlarged.  There is a left-sided nephrostomy tube present.  There is bilateral hydronephrosis present.  There is a small air bubble identified within the left renal collecting system.  No renal or ureteral calculi are appreciated.  There are surgical changes related to patient's prior creation of transverse colon conduit for urinary diversion.  The abdominal aorta tapers normally without aneurysmal dilatation.  No para-aortic lymphadenopathy is identified.  The patient has bilateral ostomies.  No dilated loops of bowel are evident.  Patient is status post hysterectomy.  No abnormal adnexal masses are appreciated.  The urinary bladder is decompressed.  There is no evidence for pelvic or inguinal lymphadenopathy.   Impression:       Surgical changes with bilateral abdominal ostomies present as before.    Bilateral hydronephrosis and proximal hydroureter despite the presence of a left sided nephrostomy tube which appears in satisfactory  position.  Note the patient has a history of bilateral ureteral obstruction with creation of transverse colon conduit.    Mild hepatomegaly.    S/p hysterectomy.  Status post cholecystectomy.      Electronically signed by: Oj Del Rio MD  Date: 12/09/2023  Time: 15:16   US Retroperitoneal Complete [[id number]] Resulted: 12/09/23 1053   Order Status: Completed Updated: 12/09/23 1055   Narrative:     EXAMINATION:  US RETROPERITONEAL COMPLETE    CLINICAL HISTORY:  ODESSA;    TECHNIQUE:  Ultrasound of the kidneys and urinary bladder was performed including color flow and Doppler evaluation of the kidneys.    COMPARISON:  CT abdomen pelvis 11/03/2023.  Retroperitoneal ultrasound 06/01/2023.    FINDINGS:  Right kidney: The right kidney measures 12.3 cm. No cortical thinning. No loss of corticomedullary distinction. Resistive index measures 0.78.  No mass. No renal stone. Moderate hydronephrosis.    Left kidney: The left kidney measures 12.6 cm.   No cortical thinning. No loss of corticomedullary distinction. Resistive index measures 0.79.  No mass. No renal stone.Left nephrostomy tube.  Minimal residual caliectasis, without overt hydronephrosis.    Reported urostomy.  The bladder is not well visualized.    Splenic resistive index measures 0.56.   Impression:       Moderate right hydronephrosis, similar to prior.    Left nephrostomy.  No overt left hydronephrosis.    Electronically signed by resident: John Juarez  Date: 12/09/2023  Time: 10:14    Electronically signed by: Diomedes Chung Jr  Date: 12/09/2023  Time: 10:53     Imaging History    2023    Date Procedure Name Study Review Link PACS Link Status Accession Number Location   12/09/23 11:20 AM CT Abdomen Pelvis  Without Contrast Study Review  Images Final [id number]   12/09/23 09:46 AM US Retroperitoneal Complete Study Review  Images Final [id number]   12/09/23 02:14 PM CARDIAC MONITORING STRIPS Study Review  Final

## 2023-12-10 NOTE — SUBJECTIVE & OBJECTIVE
Interval History:   Urine cx growing GNR, ID following. Continue empiric cefepime. CT A/P w/o contrast with stable b/l hydranephrosis. Cr improved to 1.6 from 1.9 , baseline 1.3. Continue gentle IVF hydration. Encourage PO hydration        Objective:     Vital Signs (Most Recent):  Temp: 97.5 °F (36.4 °C) (12/10/23 0803)  Pulse: 71 (12/10/23 0803)  Resp: 18 (12/10/23 0803)  BP: (!) 112/56 (12/10/23 0803)  SpO2: 100 % (12/10/23 0803) Vital Signs (24h Range):  Temp:  [97.5 °F (36.4 °C)-98.3 °F (36.8 °C)] 97.5 °F (36.4 °C)  Pulse:  [67-95] 71  Resp:  [17-18] 18  SpO2:  [98 %-100 %] 100 %  BP: (103-112)/(55-60) 112/56     Weight: 81 kg (178 lb 9.2 oz)  Body mass index is 27.97 kg/m².    Intake/Output Summary (Last 24 hours) at 12/10/2023 1007  Last data filed at 12/10/2023 0825  Gross per 24 hour   Intake 2040 ml   Output 2955 ml   Net -915 ml           Physical Exam  Vitals and nursing note reviewed.   Constitutional:       Appearance: She is ill-appearing.   Eyes:      General: No scleral icterus.     Pupils: Pupils are equal, round, and reactive to light.   Cardiovascular:      Rate and Rhythm: Normal rate and regular rhythm.      Pulses: Normal pulses.   Pulmonary:      Effort: Pulmonary effort is normal. No respiratory distress.      Breath sounds: No wheezing.   Abdominal:      Tenderness: There is abdominal tenderness (epigastric).      Comments: RLQ Ileostomy with brown liquid stool output, surrounding abdominal wall near ileostomy with erythema/hyperpigmented appearance.     LUQ urostomy  Left Flank nephrostomy tube   Skin:     General: Skin is warm and dry.   Neurological:      General: No focal deficit present.      Mental Status: She is alert.

## 2023-12-10 NOTE — ASSESSMENT & PLAN NOTE
-patient with hx of MDRO and VRE and recent bouts pyelonephritis, with similar symptoms, epigastric tenderness, leukocytosis, pyuria  -ID Consulted. Continue cefepime.  -Urine cx growing GNR, final results pending   -Blood cx with NGTD  -CT A/P w/o contrast with stable hydronephrosis BL but no mention perinephric fat stranding   -Per ID, will need L PCNT exchanged once cultures known and abx tailored   -PRN  analgesics

## 2023-12-10 NOTE — PLAN OF CARE
Problem: Ongoing Anesthesia Effects (Ileostomy)  Goal: Anesthesia/Sedation Recovery  Outcome: Ongoing, Progressing     Problem: Pain (Ileostomy)  Goal: Acceptable Pain Control  Outcome: Ongoing, Progressing     Problem: Postoperative Nausea and Vomiting (Ileostomy)  Goal: Nausea and Vomiting Relief  Outcome: Ongoing, Progressing     Problem: Postoperative Stoma Care (Ileostomy)  Goal: Optimal Stoma Healing  Outcome: Ongoing, Progressing     Problem: Postoperative Urinary Retention (Ileostomy)  Goal: Effective Urinary Elimination  Outcome: Ongoing, Progressing     Problem: Respiratory Compromise (Ileostomy)  Goal: Effective Oxygenation and Ventilation  Outcome: Ongoing, Progressing     Problem: Self-Care Deficit  Goal: Improved Ability to Complete Activities of Daily Living  Outcome: Ongoing, Progressing     Problem: VTE (Venous Thromboembolism)  Goal: VTE (Venous Thromboembolism) Symptom Resolution  Outcome: Ongoing, Progressing     Problem: Adjustment to Surgery (Urinary Diversion)  Goal: Psychosocial Adjustment Initiation  Outcome: Ongoing, Progressing     Problem: Bleeding (Urinary Diversion)  Goal: Absence of Bleeding  Outcome: Ongoing, Progressing     Problem: Bowel Motility Impaired (Urinary Diversion)  Goal: Effective Bowel Elimination  Outcome: Ongoing, Progressing     Problem: Fluid and Electrolyte Imbalance (Urinary Diversion)  Goal: Fluid and Electrolyte Balance  Outcome: Ongoing, Progressing     Problem: Infection (Urinary Diversion)  Goal: Absence of Infection Signs and Symptoms  Outcome: Ongoing, Progressing     Problem: Ongoing Anesthesia Effects (Urinary Diversion)  Goal: Anesthesia/Sedation Recovery  Outcome: Ongoing, Progressing     Problem: Pain (Urinary Diversion)  Goal: Acceptable Pain Control  Outcome: Ongoing, Progressing     Problem: Postoperative Nausea and Vomiting (Urinary Diversion)  Goal: Nausea and Vomiting Relief  Outcome: Ongoing, Progressing     Problem: Postoperative Stoma Care  (Urinary Diversion)  Goal: Optimal Stoma Healing  Outcome: Ongoing, Progressing     Problem: Respiratory Compromise (Urinary Diversion)  Goal: Effective Oxygenation and Ventilation  Outcome: Ongoing, Progressing     Problem: Urine Elimination Impaired (Urinary Diversion)  Goal: Effective Urinary Elimination  Outcome: Ongoing, Progressing   Pt progressing towards goals. Telemetry maintained,NSR. Iliostomy draining yellow/brown liquid. Urostomy draining yellow urine with mucous strands. Left Nephrostomy tube intact draining yellow urine with sediment.continue iv antibiotics. No further c/o nausea or vomiting.casandra a regular diet.safety precautions maintained.bed in low position.rails up x3.call bell in reach.bed alarm in use for pt safety.continue plan of care.

## 2023-12-10 NOTE — PROGRESS NOTES
[facility]  Infectious Disease  Progress Note    Patient Name: Edita Riley  MRN: [id number]  Admission Date: 12/7/2023  Length of Stay: 2 days  Attending Physician: Jamar Campbell MD  Primary Care Provider: Trina Vu MD    Isolation Status: No active isolations  Assessment/Plan:      ID  * Pyelonephritis  57 yo female with Hx of Cervical Cancer s/p XRT & Bilateral Ureteral strictures with obstructive uropathy now s/p transverse colon conduit, s/p Left Nephrostomy tube, s/p Ileostomy, hx of MDR UTI/pyelonephritis presents to the ED with complaints of abdominal pain and cf pyelonephritis.  UA + and UCX with GNR from L nephrostomy tue.  Blood cultures NGTD.  On cefepime.  Feels a little improved.  CT ABD/PEelvis with stable hydronephrosis BL but no mention perinephric fat stranding.      Urine cultures being re-run in micro lab.  Patient stable and WBC now normalized while on cefepime.  Clinically improved. The patient denies any recent fever, chills, or sweats.    Plan:  Continue cefepime  FU urine cultures  Will need L PCNT exchanged once cultures known and abx tailored  Given CT findings and ODESSA, rec urology consult.  Will follow.           Anticipated Disposition: tbd    Thank you for your consult. I will follow-up with patient. Please contact us if you have any additional questions.    ADRIAN Armenta  Infectious Disease  [facility]    Subjective:     Principal Problem:Pyelonephritis    HPI: 59 y/o AAF hx of Cervical Cancer s/p XRT & Bilateral Ureteral strictures with obstructive uropathy now s/p transverse colon conduit, s/p Left Nephrostomy tube, s/p Ileostomy, hx of MDR UTI/pyelonephritis presents to the ED with complaints of abdominal pain.  Since Monday 12/4, she has had recurrent nausea vomiting over 3 days, poor diet/oral intake with associated weakness.      2 recent hospitalizations with left sided pyelonephritis, prior urine cultures show  different MDR bacteria that have grown.  She is pending evaluation by colo-rectal surgery for outpatient revision of urostomy, follows with Dr. Balbuena (urology)      Reported having epigastric pain 10/10 on arrival in ED  9/10 only mild relief with initial morphine dosage.      Patient admitted and UA/UCx obtained.  UA with WBC >100, 3+ leukocytes.  Started on cefepime.  Blood cultures NGTD. WBC 13-14.  ID consulted for abx recs.  No imaging done.    Patient reports feeling a little better.  Still with L sided back pain.  The patient denies any recent fever, chills, or sweats.  N/V resolved.    Interval History:   No AEON.  Afebrile and WBC WNL now  Feeling a little better daily  Cr improved to 1.6  Feels 50% better  The patient denies any recent fever, chills, or sweats.      Review of Systems   Constitutional:  Negative for activity change, chills, diaphoresis and fever.   Respiratory:  Negative for cough, shortness of breath and wheezing.    Cardiovascular:  Negative for chest pain.   Gastrointestinal:  Negative for abdominal pain, constipation, diarrhea, nausea and vomiting.   Genitourinary:  Negative for dysuria, frequency and urgency.   Neurological:  Negative for dizziness.   Hematological:  Does not bruise/bleed easily.     Objective:     Vital Signs (Most Recent):  Temp: 97.5 °F (36.4 °C) (12/10/23 0803)  Pulse: 71 (12/10/23 0803)  Resp: 18 (12/10/23 0803)  BP: (!) 112/56 (12/10/23 0803)  SpO2: 100 % (12/10/23 0803) Vital Signs (24h Range):  Temp:  [97.5 °F (36.4 °C)-98.3 °F (36.8 °C)] 97.5 °F (36.4 °C)  Pulse:  [67-95] 71  Resp:  [17-18] 18  SpO2:  [98 %-100 %] 100 %  BP: (103-112)/(55-60) 112/56     Weight: 81 kg (178 lb 9.2 oz)  Body mass index is 27.97 kg/m².    Estimated Creatinine Clearance: 41 mL/min (A) (based on SCr of 1.6 mg/dL (H)).     Physical Exam  Constitutional:       General: She is not in acute distress.     Appearance: Normal appearance. She is well-developed. She is not ill-appearing,  toxic-appearing or diaphoretic.       HENT:      Head: Normocephalic and atraumatic.   Cardiovascular:      Rate and Rhythm: Normal rate and regular rhythm.      Heart sounds: Normal heart sounds. No murmur heard.     No friction rub. No gallop.   Pulmonary:      Effort: Pulmonary effort is normal. No respiratory distress.      Breath sounds: Normal breath sounds. No wheezing or rales.   Abdominal:      General: Bowel sounds are normal. There is no distension.      Palpations: Abdomen is soft. There is no mass.      Tenderness: There is no abdominal tenderness. There is no guarding or rebound.   Skin:     General: Skin is warm and dry.   Neurological:      Mental Status: She is alert and oriented to person, place, and time.   Psychiatric:         Behavior: Behavior normal.          Significant Labs: Blood Culture:   Recent Labs   Lab 08/18/23 2043 09/09/23  1806 09/09/23  1807 12/07/23 1953 12/08/23  0155   LABBLOO No growth after 5 days.  No growth after 5 days. No growth after 5 days. No growth after 5 days. No Growth to date  No Growth to date  No Growth to date  No Growth to date  No Growth to date  No Growth to date No Growth to date  No Growth to date  No Growth to date  No Growth to date  No Growth to date  No Growth to date       CBC:   Recent Labs   Lab 12/09/23  0246 12/10/23  0355   WBC 10.81 6.88   HGB 11.9* 11.4*   HCT 37.3 36.6*    312       CMP:   Recent Labs   Lab 12/09/23  0246 12/10/23  0355    140   K 3.6 3.5   * 115*   CO2 15* 16*   * 89   BUN 31* 22*   CREATININE 1.9* 1.6*   CALCIUM 9.4 9.4   ALBUMIN 3.0* 2.8*   ANIONGAP 10 9       Urine Culture:   Recent Labs   Lab 09/09/23  1416 09/11/23  1226 10/21/23  2018 11/03/23  2119 12/07/23  2131   LABURIN ENTEROBACTER CLOACAE  >100,000 cfu/ml  *  ACINETOBACTER BAUMANNII/HAEMOLYTICUS  50,000 - 99,999 cfu/ml  * ENTEROCOCCUS FAECIUM VRE  10,000 - 49,999 cfu/ml  No other significant isolate  * ENTEROCOCCUS  FAECALIS  >100,000 cfu/ml  No other significant isolate  * ESCHERICHIA COLI  >100,000 cfu/ml  * GRAM NEGATIVE BALAJI  >100,000 cfu/ml  Identification and susceptibility pending  *       Urine Studies:   Recent Labs   Lab 12/07/23 2131   COLORU [place]   APPEARANCEUA Cloudy*   PHUR 7.0   SPECGRAV 1.015   PROTEINUA 2+*   GLUCUA Negative   KETONESU Negative   BILIRUBINUA Negative   OCCULTUA 1+*   NITRITE Negative   LEUKOCYTESUR 3+*   RBCUA 43*   WBCUA >100*   BACTERIA Many*   SQUAMEPITHEL 4   HYALINECASTS 0       All pertinent labs within the past 24 hours have been reviewed.    Significant Imaging: I have reviewed all pertinent imaging results/findings within the past 24 hours.  Procedure Component Value Units Date/Time   CT Abdomen Pelvis Without Contrast [[id number]] Resulted: 12/09/23 1516   Order Status: Completed Updated: 12/09/23 1519   Narrative:     EXAMINATION:  CT ABDOMEN PELVIS WITHOUT CONTRAST    CLINICAL HISTORY:  Pyelonephritis ;    TECHNIQUE:  Low dose axial images, sagittal and coronal reformations were obtained from the lung bases to the pubic symphysis.  Oral contrast was not administered.    COMPARISON:  11/03/2023.    FINDINGS:  There are linear opacities within the lung bases consistent with linear atelectasis.  No pleural effusions are identified.  Bony structures appear intact.  There is no evidence for acute fracture or bone destruction.  The liver appears mildly enlarged.  The liver is homogeneous in density with no focal liver lesions identified.  The gallbladder is absent.  The spleen, stomach, and pancreas all appear unremarkable.  The adrenal glands are not enlarged.  There is a left-sided nephrostomy tube present.  There is bilateral hydronephrosis present.  There is a small air bubble identified within the left renal collecting system.  No renal or ureteral calculi are appreciated.  There are surgical changes related to patient's prior creation of transverse colon conduit for urinary  diversion.  The abdominal aorta tapers normally without aneurysmal dilatation.  No para-aortic lymphadenopathy is identified.  The patient has bilateral ostomies.  No dilated loops of bowel are evident.  Patient is status post hysterectomy.  No abnormal adnexal masses are appreciated.  The urinary bladder is decompressed.  There is no evidence for pelvic or inguinal lymphadenopathy.   Impression:       Surgical changes with bilateral abdominal ostomies present as before.    Bilateral hydronephrosis and proximal hydroureter despite the presence of a left sided nephrostomy tube which appears in satisfactory position.  Note the patient has a history of bilateral ureteral obstruction with creation of transverse colon conduit.    Mild hepatomegaly.    S/p hysterectomy.  Status post cholecystectomy.      Electronically signed by: Oj Del Rio MD  Date: 12/09/2023  Time: 15:16   US Retroperitoneal Complete [[id number]] Resulted: 12/09/23 1053   Order Status: Completed Updated: 12/09/23 1055   Narrative:     EXAMINATION:  US RETROPERITONEAL COMPLETE    CLINICAL HISTORY:  ODESSA;    TECHNIQUE:  Ultrasound of the kidneys and urinary bladder was performed including color flow and Doppler evaluation of the kidneys.    COMPARISON:  CT abdomen pelvis 11/03/2023.  Retroperitoneal ultrasound 06/01/2023.    FINDINGS:  Right kidney: The right kidney measures 12.3 cm. No cortical thinning. No loss of corticomedullary distinction. Resistive index measures 0.78.  No mass. No renal stone. Moderate hydronephrosis.    Left kidney: The left kidney measures 12.6 cm.   No cortical thinning. No loss of corticomedullary distinction. Resistive index measures 0.79.  No mass. No renal stone.Left nephrostomy tube.  Minimal residual caliectasis, without overt hydronephrosis.    Reported urostomy.  The bladder is not well visualized.    Splenic resistive index measures 0.56.   Impression:       Moderate right hydronephrosis, similar to prior.    Left  nephrostomy.  No overt left hydronephrosis.    Electronically signed by resident: John Juarez  Date: 12/09/2023  Time: 10:14    Electronically signed by: Diomedes Chung Jr  Date: 12/09/2023  Time: 10:53     Imaging History    2023    Date Procedure Name Study Review Link PACS Link Status Accession Number Location   12/09/23 11:20 AM CT Abdomen Pelvis  Without Contrast Study Review  Images Final [id number] [facility]   12/09/23 09:46 AM US Retroperitoneal Complete Study Review  Images Final [id number] [facility]   12/09/23 02:14 PM CARDIAC MONITORING STRIPS Study Review  Final

## 2023-12-10 NOTE — NURSING
Pt is AAOx4. Pt has ileostomy to right lower quadrant clean intact, urostomy left and nephrostomy left lower side clean intact stable no complaints of any nor any order from either site. Pt states she has generalized weakness is unable to get out of bed  was informed to call nurse for assistance. No distress noted. Call light in reach. Bed in low locked position.   Side rails x2. Belongings at bedside.   Pt free of fall and injuries Questions and concerns voiced and answered.    Plan of care continues.

## 2023-12-10 NOTE — PROGRESS NOTES
[facility] [address] [place] [address] Medicine  Progress Note    Patient Name: Edita Riley  MRN: [id number]  Patient Class: IP- Inpatient   Admission Date: 12/7/2023  Length of Stay: 2 days  Attending Physician: Jamar Campbell MD  Primary Care Provider: Trina Vu MD        Subjective:     Principal Problem:Pyelonephritis        HPI:  57 y/o AAF hx of Cervical Cancer s/p XRT & Bilateral Ureteral strictures with obstructive uropathy now s/p transverse colon conduit, s/p Left Nephrostomy tube, s/p Ileostomy, hx of MDR UTI/pyelonephritis presents to the ED with complaints of abdominal pain.  She reports since Monday 12/4, she has had recurrent nausea vomiting over 3 days, poor diet/oral intake with associated weakness.     2 recent hospitalizations with left sided pyelonephritis, prior urine cultures show different MDR bacteria that have grown.  She is pending evaluation by colo-rectal surgery for outpatient revision of urostomy, follows with Dr. Balbuena (urology)     Reports having epigastric pain 10/10 on arrival,  9/10 during interview, only mild relief with initial morphine dosage.   She does not note any change in the ileostomy stool output, no reported change in amount or quality from her urostomy or her left nephrostomy tube. She denies any fevers of chills.     ED Treatment: Morphine 4mg IV,  Zofran 4mg IV, NS 1Liter    Overview/Hospital Course:  No notes on file    Interval History:   Urine cx growing GNR, ID following. Continue empiric cefepime. CT A/P w/o contrast with stable b/l hydranephrosis. Cr improved to 1.6 from 1.9 , baseline 1.3. Continue gentle IVF hydration. Encourage PO hydration        Objective:     Vital Signs (Most Recent):  Temp: 97.5 °F (36.4 °C) (12/10/23 0803)  Pulse: 71 (12/10/23 0803)  Resp: 18 (12/10/23 0803)  BP: (!) 112/56 (12/10/23 0803)  SpO2: 100 % (12/10/23 0803) Vital Signs (24h Range):  Temp:  [97.5 °F (36.4 °C)-98.3 °F (36.8 °C)] 97.5 °F (36.4 °C)  Pulse:   [67-95] 71  Resp:  [17-18] 18  SpO2:  [98 %-100 %] 100 %  BP: (103-112)/(55-60) 112/56     Weight: 81 kg (178 lb 9.2 oz)  Body mass index is 27.97 kg/m².    Intake/Output Summary (Last 24 hours) at 12/10/2023 1007  Last data filed at 12/10/2023 0825  Gross per 24 hour   Intake 2040 ml   Output 2955 ml   Net -915 ml           Physical Exam  Vitals and nursing note reviewed.   Constitutional:       Appearance: She is ill-appearing.   Eyes:      General: No scleral icterus.     Pupils: Pupils are equal, round, and reactive to light.   Cardiovascular:      Rate and Rhythm: Normal rate and regular rhythm.      Pulses: Normal pulses.   Pulmonary:      Effort: Pulmonary effort is normal. No respiratory distress.      Breath sounds: No wheezing.   Abdominal:      Tenderness: There is abdominal tenderness (epigastric).      Comments: RLQ Ileostomy with brown liquid stool output, surrounding abdominal wall near ileostomy with erythema/hyperpigmented appearance.     LUQ urostomy  Left Flank nephrostomy tube   Skin:     General: Skin is warm and dry.   Neurological:      General: No focal deficit present.      Mental Status: She is alert.             Assessment/Plan:      * Pyelonephritis  -patient with hx of MDRO and VRE and recent bouts pyelonephritis, with similar symptoms, epigastric tenderness, leukocytosis, pyuria  -ID Consulted. Continue cefepime.  -Urine cx growing GNR, final results pending   -Blood cx with NGTD  -CT A/P w/o contrast with stable hydronephrosis BL but no mention perinephric fat stranding   -Per ID, will need L PCNT exchanged once cultures known and abx tailored   -PRN  analgesics    Metabolic acidosis, NAG, bicarbonate losses  Patient with ileostomy, patient reporting increased output recently  -Continue home oral sodium bicarbonate   -If Serum Bicarb is downtrending- she may require bicarbonate infusion    Trend Lactic acid      Weakness  Suspect related to acute cystitis vs pyelonephritis        Azotemia  Mild creatinine elevation but rising BUN, suspect a pre-renal etiology related to nausea/vomiting and decreased oral intake  -Trend Renal function panel      Essential hypertension  patient is not on any chronic oral therapy at this time    CKD (chronic kidney disease), stage III  ODESSA on CKD   BMP reviewed- noted Estimated Creatinine Clearance: 34.9 mL/min (A) (based on SCr of 1.9 mg/dL (H)). according to latest data. Based on current GFR, CKD stage is stage 3 - GFR 30-59.  Monitor UOP and serial BMP and adjust therapy as needed. Renally dose meds. Avoid nephrotoxic medications and procedures.    Cr 1.9, baseline 1.4  Likely iso of pyelonephritis vs dehydration   FeNA 0.2%, Start gentle IVF hydration  Abx as above    Nephrostomy status  Left Nephrostomy draining urine- if any reduced UOp or ODESSA developing - obtain renal imaging to r/o nephrostomy tube dysfunction      Presence of urostomy  Consult wound care re: urostomy care supplies while patient is admitted      History of DVT (deep vein thrombosis)  -not on chronic anticoagulation  -continue sq enoxaparin DVT ppx      Ileostomy in place  Consult wound care re: ileostomy care supplies while patient is admitted        VTE Risk Mitigation (From admission, onward)           Ordered     enoxaparin injection 40 mg  Daily         12/08/23 0225     IP VTE HIGH RISK PATIENT  Once         12/08/23 0225     Place sequential compression device  Until discontinued         12/08/23 0225                    Discharge Planning   OK: 12/9/2023     Code Status: Full Code   Is the patient medically ready for discharge?:     Reason for patient still in hospital (select all that apply): Patient trending condition  Discharge Plan A: Home, Home with family                  Jamar Campbell MD  Department of Hospital Medicine   [facility]

## 2023-12-10 NOTE — PLAN OF CARE
Problem: Adult Inpatient Plan of Care  Goal: Plan of Care Review  Outcome: Ongoing, Progressing  Goal: Patient-Specific Goal (Individualized)  Outcome: Ongoing, Progressing  Goal: Absence of Hospital-Acquired Illness or Injury  Outcome: Ongoing, Progressing  Goal: Optimal Comfort and Wellbeing  Outcome: Ongoing, Progressing  Goal: Readiness for Transition of Care  Outcome: Ongoing, Progressing     Problem: Impaired Wound Healing  Goal: Optimal Wound Healing  Outcome: Ongoing, Progressing     Problem: Skin Injury Risk Increased  Goal: Skin Health and Integrity  Outcome: Ongoing, Progressing     Problem: Fall Injury Risk  Goal: Absence of Fall and Fall-Related Injury  Outcome: Ongoing, Progressing     Problem: Pain Acute  Goal: Acceptable Pain Control and Functional Ability  Outcome: Ongoing, Progressing     Problem: Fatigue  Goal: Improved Activity Tolerance  Outcome: Ongoing, Progressing     Problem: Adjustment to Illness (Chronic Kidney Disease)  Goal: Optimal Coping with Chronic Illness  Outcome: Ongoing, Progressing     Problem: Electrolyte Imbalance (Chronic Kidney Disease)  Goal: Electrolyte Balance  Outcome: Ongoing, Progressing     Problem: Fluid Volume Excess (Chronic Kidney Disease)  Goal: Fluid Balance  Outcome: Ongoing, Progressing     Problem: Functional Decline (Chronic Kidney Disease)  Goal: Optimal Functional Ability  Outcome: Ongoing, Progressing     Problem: Hematologic Alteration (Chronic Kidney Disease)  Goal: Absence of Anemia Signs and Symptoms  Outcome: Ongoing, Progressing     Problem: Oral Intake Inadequate (Chronic Kidney Disease)  Goal: Optimal Oral Intake  Outcome: Ongoing, Progressing     Problem: Pain (Chronic Kidney Disease)  Goal: Acceptable Pain Control  Outcome: Ongoing, Progressing     Problem: Renal Function Impairment (Chronic Kidney Disease)  Goal: Minimize Renal Failure Effects  Outcome: Ongoing, Progressing     Problem: Hypertension Acute  Goal: Blood Pressure Within Desired  Range  Outcome: Ongoing, Progressing     Problem: UTI (Urinary Tract Infection)  Goal: Improved Infection Symptoms  Outcome: Ongoing, Progressing     Problem: Pain Chronic (Persistent) (Comorbidity Management)  Goal: Acceptable Pain Control and Functional Ability  Outcome: Ongoing, Progressing     Problem: Fluid and Electrolyte Imbalance (Acute Kidney Injury/Impairment)  Goal: Fluid and Electrolyte Balance  Outcome: Ongoing, Progressing     Problem: Oral Intake Inadequate (Acute Kidney Injury/Impairment)  Goal: Optimal Nutrition Intake  Outcome: Ongoing, Progressing     Problem: Renal Function Impairment (Acute Kidney Injury/Impairment)  Goal: Effective Renal Function  Outcome: Ongoing, Progressing     Problem: Adjustment to Surgery (Colostomy)  Goal: Optimal Coping  Outcome: Ongoing, Progressing     Problem: Activity Intolerance  Goal: Enhanced Capacity and Energy  Outcome: Ongoing, Progressing     Problem: Bleeding (Colostomy)  Goal: Absence of Bleeding  Outcome: Ongoing, Progressing     Problem: Infection (Colostomy)  Goal: Absence of Infection Signs and Symptoms  Outcome: Ongoing, Progressing     Problem: Ongoing Anesthesia Effects (Colostomy)  Goal: Anesthesia/Sedation Recovery  Outcome: Ongoing, Progressing     Problem: Pain (Colostomy)  Goal: Acceptable Pain Control  Outcome: Ongoing, Progressing     Problem: Postoperative Nausea and Vomiting (Colostomy)  Goal: Nausea and Vomiting Relief  Outcome: Ongoing, Progressing     Problem: Postoperative Stoma Care (Colostomy)  Goal: Optimal Stoma Healing  Outcome: Ongoing, Progressing     Problem: Respiratory Compromise (Colostomy)  Goal: Effective Oxygenation and Ventilation  Outcome: Ongoing, Progressing     Problem: Postoperative Urinary Retention (Colostomy)  Goal: Effective Urinary Elimination  Outcome: Ongoing, Progressing     Problem: Adjustment to Surgery (Ileostomy)  Goal: Optimal Coping  Outcome: Ongoing, Progressing     Problem: Bleeding (Ileostomy)  Goal:  Absence of Bleeding  Outcome: Ongoing, Progressing     Problem: Fluid, Electrolyte and Nutrition Imbalance (Ileostomy)  Goal: Fluid, Electrolyte and Nutrition Balance  Outcome: Ongoing, Progressing     Problem: Infection (Ileostomy)  Goal: Absence of Infection Signs and Symptoms  Outcome: Ongoing, Progressing     Pt progressing towards goals. No distress notice. No falls or injuries during shift. Bed in lowest position, call light within reach, belonging at bedside. Safety precaution maintain.Plan of Care reviewed. Pt verbalized understanding.

## 2023-12-11 ENCOUNTER — TELEPHONE (OUTPATIENT)
Dept: SURGERY | Facility: CLINIC | Age: 58
End: 2023-12-11
Payer: MEDICAID

## 2023-12-11 LAB
ANION GAP SERPL CALC-SCNC: 8 MMOL/L (ref 8–16)
BASOPHILS # BLD AUTO: 0.03 K/UL (ref 0–0.2)
BASOPHILS NFR BLD: 0.5 % (ref 0–1.9)
BUN SERPL-MCNC: 18 MG/DL (ref 6–20)
CALCIUM SERPL-MCNC: 9.2 MG/DL (ref 8.7–10.5)
CHLORIDE SERPL-SCNC: 113 MMOL/L (ref 95–110)
CO2 SERPL-SCNC: 18 MMOL/L (ref 23–29)
CREAT SERPL-MCNC: 1.3 MG/DL (ref 0.5–1.4)
DIFFERENTIAL METHOD: ABNORMAL
EOSINOPHIL # BLD AUTO: 0.2 K/UL (ref 0–0.5)
EOSINOPHIL NFR BLD: 3.3 % (ref 0–8)
ERYTHROCYTE [DISTWIDTH] IN BLOOD BY AUTOMATED COUNT: 14.2 % (ref 11.5–14.5)
EST. GFR  (NO RACE VARIABLE): 47.1 ML/MIN/1.73 M^2
GLUCOSE SERPL-MCNC: 92 MG/DL (ref 70–110)
HCT VFR BLD AUTO: 34.7 % (ref 37–48.5)
HGB BLD-MCNC: 10.9 G/DL (ref 12–16)
IMM GRANULOCYTES # BLD AUTO: 0.01 K/UL (ref 0–0.04)
IMM GRANULOCYTES NFR BLD AUTO: 0.2 % (ref 0–0.5)
LYMPHOCYTES # BLD AUTO: 1.4 K/UL (ref 1–4.8)
LYMPHOCYTES NFR BLD: 23.7 % (ref 18–48)
MCH RBC QN AUTO: 27.7 PG (ref 27–31)
MCHC RBC AUTO-ENTMCNC: 31.4 G/DL (ref 32–36)
MCV RBC AUTO: 88 FL (ref 82–98)
MONOCYTES # BLD AUTO: 0.7 K/UL (ref 0.3–1)
MONOCYTES NFR BLD: 12.7 % (ref 4–15)
NEUTROPHILS # BLD AUTO: 3.4 K/UL (ref 1.8–7.7)
NEUTROPHILS NFR BLD: 59.6 % (ref 38–73)
NRBC BLD-RTO: 0 /100 WBC
PLATELET # BLD AUTO: 305 K/UL (ref 150–450)
PMV BLD AUTO: 10.1 FL (ref 9.2–12.9)
POTASSIUM SERPL-SCNC: 3.6 MMOL/L (ref 3.5–5.1)
RBC # BLD AUTO: 3.94 M/UL (ref 4–5.4)
SODIUM SERPL-SCNC: 139 MMOL/L (ref 136–145)
WBC # BLD AUTO: 5.75 K/UL (ref 3.9–12.7)

## 2023-12-11 PROCEDURE — 25000003 PHARM REV CODE 250: Performed by: HOSPITALIST

## 2023-12-11 PROCEDURE — 25000003 PHARM REV CODE 250: Performed by: STUDENT IN AN ORGANIZED HEALTH CARE EDUCATION/TRAINING PROGRAM

## 2023-12-11 PROCEDURE — 85025 COMPLETE CBC W/AUTO DIFF WBC: CPT | Performed by: STUDENT IN AN ORGANIZED HEALTH CARE EDUCATION/TRAINING PROGRAM

## 2023-12-11 PROCEDURE — 36415 COLL VENOUS BLD VENIPUNCTURE: CPT | Performed by: STUDENT IN AN ORGANIZED HEALTH CARE EDUCATION/TRAINING PROGRAM

## 2023-12-11 PROCEDURE — 21400001 HC TELEMETRY ROOM

## 2023-12-11 PROCEDURE — 99233 PR SUBSEQUENT HOSPITAL CARE,LEVL III: ICD-10-PCS | Mod: ,,, | Performed by: PHYSICIAN ASSISTANT

## 2023-12-11 PROCEDURE — 63600175 PHARM REV CODE 636 W HCPCS: Performed by: HOSPITALIST

## 2023-12-11 PROCEDURE — 99233 SBSQ HOSP IP/OBS HIGH 50: CPT | Mod: ,,, | Performed by: PHYSICIAN ASSISTANT

## 2023-12-11 PROCEDURE — 80048 BASIC METABOLIC PNL TOTAL CA: CPT | Performed by: STUDENT IN AN ORGANIZED HEALTH CARE EDUCATION/TRAINING PROGRAM

## 2023-12-11 RX ORDER — SODIUM BICARBONATE 650 MG/1
1300 TABLET ORAL 3 TIMES DAILY
Status: DISCONTINUED | OUTPATIENT
Start: 2023-12-11 | End: 2023-12-13 | Stop reason: HOSPADM

## 2023-12-11 RX ORDER — SODIUM BICARBONATE 650 MG/1
650 TABLET ORAL 2 TIMES DAILY
Status: DISCONTINUED | OUTPATIENT
Start: 2023-12-11 | End: 2023-12-11

## 2023-12-11 RX ADMIN — CEFEPIME 1 G: 1 INJECTION, POWDER, FOR SOLUTION INTRAMUSCULAR; INTRAVENOUS at 01:12

## 2023-12-11 RX ADMIN — SODIUM BICARBONATE 650 MG TABLET 1300 MG: at 03:12

## 2023-12-11 RX ADMIN — SODIUM BICARBONATE 650 MG TABLET 1300 MG: at 09:12

## 2023-12-11 RX ADMIN — ENOXAPARIN SODIUM 40 MG: 40 INJECTION SUBCUTANEOUS at 04:12

## 2023-12-11 RX ADMIN — MICONAZOLE NITRATE: 20 POWDER TOPICAL at 09:12

## 2023-12-11 RX ADMIN — MICONAZOLE NITRATE: 20 POWDER TOPICAL at 08:12

## 2023-12-11 RX ADMIN — SODIUM BICARBONATE 650 MG TABLET 650 MG: at 08:12

## 2023-12-11 RX ADMIN — CEFEPIME 1 G: 1 INJECTION, POWDER, FOR SOLUTION INTRAMUSCULAR; INTRAVENOUS at 12:12

## 2023-12-11 RX ADMIN — MIRTAZAPINE 30 MG: 15 TABLET, FILM COATED ORAL at 09:12

## 2023-12-11 NOTE — ASSESSMENT & PLAN NOTE
ODESSA - resolved   BMP reviewed- noted Estimated Creatinine Clearance: 50.4 mL/min (based on SCr of 1.3 mg/dL). according to latest data. Based on current GFR, CKD stage is stage 3 - GFR 30-59.  Monitor UOP and serial BMP and adjust therapy as needed. Renally dose meds. Avoid nephrotoxic medications and procedures.    Cr 1.9, baseline 1.4  Likely iso of pyelonephritis vs dehydration   FeNA 0.2%, Received gentle IVF hydration  Abx as above  ODESSA resolved.

## 2023-12-11 NOTE — CONSULTS
Colon and Rectal Surgery Consultation     Patient Name:        Edita Riley  MRN:          [id number]  YOB: 1965  (58 y.o.)  Encounter Date:        12/11/2023  Primary Care Physician: Trina Vu MD    CC: Pyelonephritis       Patient Background:  Edita Riley is a 58 y.o. female history of cervical cancer s/p radiation therapy complicated by bilateral distal ureteral strictures. This was managed with transverse colon conduit 9/2020 and complicated by colon perforation requiring resection and end ileostomy, transverse colon conduit remains in place and functional, but she also developed left distal ureteral stricture which is now being managed with a left percutanous nephrostomy. She has had multiple admissions this year for MDR pyelonephritis and had plans for combined surgery with Tamie Balbuena and Gail but was unable to schedule due to hospital admissions.     HPI:  She presented this hospital visit on 12/7 with epigastric pain and nausea/vomiting. She was found to have pyelo and is on cefepime (ID consulted). She also had her left PCNT irrigated and bag replaced by Urology team. CRS consulted for operative planning and coordination. The patient reports she has had no issues with her ileostomy. She denies melena, hematochezia. She does report fungal skin infections due to the recurrent need for antibiotics.     Last colonoscopy 7/2020 prior to transverse colon conduit procedure.     PAST MEDICAL HISTORY:  Past Medical History:   Diagnosis Date    Abnormal mammogram 08/25/2020    Abnormal mammogram 08/25/2020    Acute blood loss anemia     Acute deep vein thrombosis (DVT) of lower extremity 12/09/2020    Advance care planning 04/30/2021    ODESSA (acute kidney injury) 03/21/2020    Anemia due to chronic blood loss     Anemia due to chronic kidney disease     Anemia due to chronic kidney disease 05/18/2020    Anxiety     Bilateral ureteral obstruction 09/11/2020     Bilious vomiting with nausea 06/11/2023    Cardiovascular event risk -- low 09/14/2015    ASCVD 10-year risk 1.9% (with optimal risk factors 1.3%) as of 9/14/2015     Cervical cancer 2014    Chronic back pain     Colostomy care     Deep vein thrombosis     Depression     Diarrhea due to malabsorption 11/14/2018    Difficult intubation     Discharge planning issues 04/30/2021    Disorder of kidney and ureter     DVT of lower extremity, bilateral 11/04/2020    Edema 04/10/2023    Emphysema of lung 04/10/2023    Fibromyalgia     Fungemia 09/27/2020    Generalized abdominal pain 08/25/2020    GERD (gastroesophageal reflux disease)     Hemifacial spasm 09/16/2015    Hiatal hernia 2014    History of cervical cancer 10/11/2018    History of essential hypertension 05/04/2015    Not requiring medications at this time  BP is low- concern that this may correlate with increased stool output from stoma. Encourage her to contact GI and her PCP.    Hx of psychiatric care     Cymbalta, trazodone    Hypertension     Hypomagnesemia 11/21/2018    Hyponatremia 09/10/2023    Impaired functional mobility, balance, gait, and endurance 07/24/2015    Lactose intolerance     Major depressive disorder, recurrent episode, moderate 06/02/2023    Metastatic squamous cell carcinoma to lymph node 10/11/2018    Moderate episode of recurrent major depressive disorder 04/21/2021    Nephrostomy complication 10/26/2023    Neuropathy due to chemotherapeutic drug 11/14/2018    Osteoarthritis of back     Peritonitis 09/22/2020    Physical deconditioning 04/30/2021    Pseudomonas urinary tract infection 04/21/2021    Psychiatric problem     Pyelitis 09/09/2023    QT prolongation 04/16/2021    Refusal of blood transfusions as patient is [employer]     Schatzki's ring 09/14/2015    Seen on outside EGD 05/2014, underwent esophageal dilatation. Bx were negative.     Seizure-like activity 12/02/2018    Seizures     Sleep stage dysfunction     Noted on  PSG 06/2017; negative for obstructive sleep apnea     Stroke     Urinary tract infection associated with nephrostomy catheter 06/11/2020    Wound infection after surgery 09/24/2020       PAST SURGICAL HISTORY:  Past Surgical History:   Procedure Laterality Date    ANTEGRADE NEPHROSTOGRAPHY Bilateral 9/28/2020    Procedure: Nephrostogram - antegrade;  Surgeon: Leslie Balbuena MD;  Location: [facility] OR 1ST FLR;  Service: Urology;  Laterality: Bilateral;    ANTEGRADE NEPHROSTOGRAPHY Left 4/20/2021    Procedure: NEPHROSTOGRAM, ANTEGRADE;  Surgeon: Leslie Balbuena MD;  Location: [facility] OR 1ST FLR;  Service: Urology;  Laterality: Left;    ANTEGRADE NEPHROSTOGRAPHY Right 10/27/2022    Procedure: NEPHROSTOGRAM, ANTEGRADE;  Surgeon: Chirag Russ MD;  Location: [facility] OR 1ST FLR;  Service: Urology;  Laterality: Right;    ANTEGRADE NEPHROSTOGRAPHY Right 4/21/2023    Procedure: NEPHROSTOGRAM, ANTEGRADE;  Surgeon: Chirag Russ MD;  Location: [facility] OR 2ND FLR;  Service: Urology;  Laterality: Right;    ANTEGRADE NEPHROSTOGRAPHY Left 6/6/2023    Procedure: Nephrostogram - antegrade;  Surgeon: Leslie Balbuena MD;  Location: [facility] OR 1ST FLR;  Service: Urology;  Laterality: Left;    BILATERAL OOPHORECTOMY  2015    CHOLECYSTECTOMY  11/09/2016    Done at [facility], path showed chronic cholecystitis and gallstones    cold knife conization  2014    COLECTOMY, RIGHT  9/17/2020    Procedure: COLECTOMY, RIGHT Extended;  Surgeon: Hammad Reynolds MD;  Location: [facility] OR 2ND FLR;  Service: Colon and Rectal;;    COLONOSCOPY  2014    [facility] N[facility]    COLONOSCOPY N/A 5/18/2018    Procedure: COLONOSCOPY;  Surgeon: Arden Gutiérrez MD;  Location: [facility] ENDO (4TH FLR);  Service: Endoscopy;  Laterality: N/A;    COLONOSCOPY N/A 7/28/2020    Procedure: COLONOSCOPY;  Surgeon: Hammad Reynolds MD;  Location: [facility] ENDO (4TH FLR);  Service: Colon and Rectal;  Laterality: N/A;  covid test [place] 7/25    CYSTOSCOPY  WITH URETEROSCOPY, RETROGRADE PYELOGRAPHY, AND INSERTION OF STENT Bilateral 3/21/2020    Procedure: CYSTOSCOPY, WITH RETROGRADE PYELOGRAM,;  Surgeon: Leslie Balbuena MD;  Location: [facility] OR 1ST FLR;  Service: Urology;  Laterality: Bilateral;    DILATION OF NEPHROSTOMY TRACT Right 10/27/2022    Procedure: DILATION, NEPHROSTOMY TRACT;  Surgeon: Cihrag Russ MD;  Location: [facility] OR 1ST FLR;  Service: Urology;  Laterality: Right;    ESOPHAGOGASTRODUODENOSCOPY  2014    ESOPHAGOGASTRODUODENOSCOPY  11/18/2020    ESOPHAGOGASTRODUODENOSCOPY N/A 11/18/2020    Procedure: ESOPHAGOGASTRODUODENOSCOPY (EGD);  Surgeon: Zenon Spencer MD;  Location: [facility];  Service: Endoscopy;  Laterality: N/A;    ESOPHAGOGASTRODUODENOSCOPY N/A 12/11/2020    Procedure: EGD (ESOPHAGOGASTRODUODENOSCOPY);  Surgeon: Juancho Muse MD;  Location: [facility] (2ND FLR);  Service: Endoscopy;  Laterality: N/A;    HYSTERECTOMY  2014    [facility] for cervical cancer    ILEOSTOMY  9/17/2020    Procedure: CREATION, ILEOSTOMY;  Surgeon: Hammad Reynolds MD;  Location: [facility] OR 2ND FLR;  Service: Colon and Rectal;;    LOOPOGRAM N/A 4/20/2021    Procedure: LOOPOGRAM;  Surgeon: Leslie Balbuena MD;  Location: [facility] OR 1ST FLR;  Service: Urology;  Laterality: N/A;    LOOPOGRAM N/A 6/6/2023    Procedure: LOOPOGRAM;  Surgeon: Leslie Balbuena MD;  Location: [facility] OR 1ST FLR;  Service: Urology;  Laterality: N/A;    MOBILIZATION OF SPLENIC FLEXURE N/A 9/11/2020    Procedure: MOBILIZATION, COLONIC;  Surgeon: Hammad Reynolds MD;  Location: [facility] OR 2ND FLR;  Service: Colon and Rectal;  Laterality: N/A;    NEPHROSTOGRAPHY Bilateral 4/17/2021    Procedure: Nephrostogram;  Surgeon: Celeste Surgeon;  Location: [facility];  Service: Anesthesiology;  Laterality: Bilateral;    OOPHORECTOMY      PERCUTANEOUS NEPHROLITHOTOMY Right 4/21/2023    Procedure: NEPHROLITHOTOMY, PERCUTANEOUS;  Surgeon: Chirag Russ MD;  Location: [facility] OR [address] [place] [address];  Service: Urology;  Laterality:  Right;  2.5 hrs    PERCUTANEOUS NEPHROSTOMY  4/21/2023    Procedure: CREATION, NEPHROSTOMY, PERCUTANEOUS with removal of existing nephrostomy tube;  Surgeon: Chirag Russ MD;  [facility];  Service: Urology;;    PYELOSCOPY Right 10/27/2022    Procedure: PYELOSCOPY;  Surgeon: Chirag Russ MD;  [facility];  Service: Urology;  Laterality: Right;    REPLACEMENT OF NEPHROSTOMY TUBE Right 10/27/2022    Procedure: REPLACEMENT, NEPHROSTOMY TUBE;  Surgeon: Chirag Russ MD;  Location: [facility] OR [address] [place] [address];  Service: Urology;  Laterality: Right;    RETROPERITONEAL LYMPHADENECTOMY Right 9/17/2018    Procedure: LYMPHADENECTOMY, RETROPERITONEUM;  Surgeon: Sebas Reed MD;  Location: [facility] OR [address] [place] [address];  Service: General;  Laterality: Right;  ILIAC    URETEROSCOPIC REMOVAL OF URETERIC CALCULUS Right 10/27/2022    Procedure: REMOVAL, CALCULUS, URETER, URETEROSCOPIC;  Surgeon: Chirag Russ MD;  Location: [facility] OR [address] [place] [address];  Service: Urology;  Laterality: Right;    URETEROSCOPY Right 10/27/2022    Procedure: URETEROSCOPY-ANTEGRADE;  Surgeon: Chirag Russ MD;  Location: [facility] OR [address] [place] [address];  Service: Urology;  Laterality: Right;       SOCIAL HISTORY:  Social History     Tobacco Use    Smoking status: Never    Smokeless tobacco: Never   Substance Use Topics    Alcohol use: No     Alcohol/week: 0.0 standard drinks of alcohol    Drug use: No     FAMILY HISTORY:  Family History   Problem Relation Age of Onset    Heart disease Sister     Heart disease Maternal Grandmother     Colon cancer Father     Esophageal cancer Father     Cancer Father         Lung-smoker    Cancer Mother         Cervical    Cervical cancer Mother     Breast cancer Maternal Aunt     Suicide Daughter         jumped from parking structure    Drug abuse Daughter     Drug abuse Daughter     Rectal cancer Neg Hx     Stomach cancer Neg Hx     Crohn's disease Neg Hx     Ulcerative colitis Neg Hx     Diabetes Neg Hx      "Hypertension Neg Hx        MEDICATIONS:  Home Meds:   Prior to Admission medications    Medication Sig Start Date End Date Taking? Authorizing Provider   loratadine (CLARITIN) 10 mg tablet Take 10 mg by mouth daily as needed for Allergies. 9/5/23  Yes Provider, Historical   mirtazapine (REMERON) 30 MG tablet Take 1 tablet (30 mg total) by mouth nightly. 11/6/23 11/5/24 Yes Andriy Coley MD   ondansetron (ZOFRAN-ODT) 4 MG TbDL Dissolve 1 tablet (4 mg total) by mouth every 8 (eight) hours as needed (nausea). 11/6/23  Yes Andriy Coley MD   sodium bicarbonate 650 MG tablet Take 650 mg by mouth 3 (three) times daily.    Provider, Historical     Scheduled Meds:   ceFEPime (MAXIPIME) IVPB  1 g Intravenous Q12H    enoxparin  40 mg Subcutaneous Daily    HYDROmorphone  1 mg Intravenous Once    miconazole NITRATE 2 %   Topical (Top) BID    mirtazapine  30 mg Oral Nightly    sodium bicarbonate  1,300 mg Oral TID     Continuous Infusions:  PRN Meds:.acetaminophen, cetirizine, HYDROmorphone, melatonin, naloxone, ondansetron, polyethylene glycol, prochlorperazine, sodium chloride 0.9%    ALLERGIES:  Bee sting [allergen ext-venom-honey bee] and Grass pollen-bermuda, standard    REVIEW OF SYSTEMS:  General: Denies fevers/chills/sweats since antibiotic initiated  Respiratory: Denies cough, shortness of breath, hemoptysis, or other respiratory problems  Cardiovascular: Denies chest pain, MUNGUIA, orthopnea/PND, palpitations, or syncope  Gastrointestinal: Denies GI symptoms currently  Musculoskeletal: Denies new focal bone pain or musculoskeletal complaints  Neurologic: Denies headaches, focal numbness/weakness    PHYSICAL EXAM:  PHYSICAL EXAMINATION:  Well-developed, well nourished, in no acute distress  Blood pressure (!) 114/56, pulse 69, temperature 97.9 °F (36.6 °C), resp. rate 18, height 5' 7" (1.702 m), weight 81 kg (178 lb 9.2 oz), last menstrual period 06/08/2014, SpO2 97 %, not currently breastfeeding.  Body mass index is " "27.97 kg/m².  HEENT: Sclerae anicteric, trachea midline  Pulm: Normal respiratory rate and effort on room air  Abd:   Soft, mild diffuse tenderness, non-distended. Right sided ileostomy productive of succus and gas. Appears pink and healthy. Left sided urostomy with urine in bag, healthy appearing. Midline laparotomy incision present.  Ext:   Warm and well perfused. No upper or lower extremity edema bilaterally.  Neuro: Grossly intact with no focal neurological deficit.      LABORATORY RESULTS:  Complete Blood Count:  Recent Labs   Lab 12/09/23  0246 12/10/23  0355 12/11/23  0828   WBC 10.81 6.88 5.75   RBC 4.29 3.99* 3.94*   HGB 11.9* 11.4* 10.9*   HCT 37.3 36.6* 34.7*    312 305       CRP:  No results for input(s): "CRP" in the last 72 hours.    Basic Chemistry Panel:  Recent Labs   Lab 12/11/23  0828      K 3.6   *   CO2 18*   BUN 18   CREATININE 1.3   CALCIUM 9.2       Hepatic Panel:  No results for input(s): "AST", "ALT", "ALKPHOS", "BILITOT", "ALBUMIN" in the last 24 hours.    Nutrition Labs:  No results for input(s): "ALBUMIN", "PREALBUMIN", "TRANSFERRIN" in the last 24 hours.    Coagulation Panel:  No results for input(s): "PT", "INR", "PTT" in the last 24 hours.    CT Abdomen Pelvis  Without Contrast  Narrative: EXAMINATION:  CT ABDOMEN PELVIS WITHOUT CONTRAST    CLINICAL HISTORY:  Pyelonephritis ;    TECHNIQUE:  Low dose axial images, sagittal and coronal reformations were obtained from the lung bases to the pubic symphysis.  Oral contrast was not administered.    COMPARISON:  11/03/2023.    FINDINGS:  There are linear opacities within the lung bases consistent with linear atelectasis.  No pleural effusions are identified.  Bony structures appear intact.  There is no evidence for acute fracture or bone destruction.  The liver appears mildly enlarged.  The liver is homogeneous in density with no focal liver lesions identified.  The gallbladder is absent.  The spleen, stomach, and pancreas " all appear unremarkable.  The adrenal glands are not enlarged.  There is a left-sided nephrostomy tube present.  There is bilateral hydronephrosis present.  There is a small air bubble identified within the left renal collecting system.  No renal or ureteral calculi are appreciated.  There are surgical changes related to patient's prior creation of transverse colon conduit for urinary diversion.  The abdominal aorta tapers normally without aneurysmal dilatation.  No para-aortic lymphadenopathy is identified.  The patient has bilateral ostomies.  No dilated loops of bowel are evident.  Patient is status post hysterectomy.  No abnormal adnexal masses are appreciated.  The urinary bladder is decompressed.  There is no evidence for pelvic or inguinal lymphadenopathy.  Impression: Surgical changes with bilateral abdominal ostomies present as before.    Bilateral hydronephrosis and proximal hydroureter despite the presence of a left sided nephrostomy tube which appears in satisfactory position.  Note the patient has a history of bilateral ureteral obstruction with creation of transverse colon conduit.    Mild hepatomegaly.    S/p hysterectomy.  Status post cholecystectomy.    Electronically signed by: Oj Del Rio MD  Date:    12/09/2023  Time:    15:16  US Retroperitoneal Complete  Narrative: EXAMINATION:  US RETROPERITONEAL COMPLETE    CLINICAL HISTORY:  ODESSA;    TECHNIQUE:  Ultrasound of the kidneys and urinary bladder was performed including color flow and Doppler evaluation of the kidneys.    COMPARISON:  CT abdomen pelvis 11/03/2023.  Retroperitoneal ultrasound 06/01/2023.    FINDINGS:  Right kidney: The right kidney measures 12.3 cm. No cortical thinning. No loss of corticomedullary distinction. Resistive index measures 0.78.  No mass. No renal stone. Moderate hydronephrosis.    Left kidney: The left kidney measures 12.6 cm.   No cortical thinning. No loss of corticomedullary distinction. Resistive index measures  0.79.  No mass. No renal stone.Left nephrostomy tube.  Minimal residual caliectasis, without overt hydronephrosis.    Reported urostomy.  The bladder is not well visualized.    Splenic resistive index measures 0.56.  Impression: Moderate right hydronephrosis, similar to prior.    Left nephrostomy.  No overt left hydronephrosis.    Electronically signed by resident: John Juarez  Date:    12/09/2023  Time:    10:14    Electronically signed by: Diomedes Chung Jr  Date:    12/09/2023  Time:    10:53        IMPRESSION:  58 y.o. female history of transverse colon conduit 9/2020 complicated by colon perforation requiring resection and end ileostomy, transverse colon conduit remains in place and functional, but she also developed left distal ureteral stricture which is now being managed with a left percutanous nephrostomy.     Currently admitted for pyelo, cultures with GNR, on cefepime. ID and Urology consulted. CRS consulted for operative planning and coordination, does not appear to have acute colorectal issue at this time.    PLAN:  - No immediate surgical intervention at this time  - Discussed with clinic staff for early outpatient follow up once she has recovered from this hospital stay. At this follow up we will also discuss combined revision surgery with Urology team.   - Appreciate ID recs for antibiotics  - Agree with Nutrition consultation and optimization  - Ostomy nurse consult for supplies and pouching issues  - Please call with questions or issues    Juan Duffy MD  Colon & Rectal Surgery

## 2023-12-11 NOTE — PLAN OF CARE
Problem: Adult Inpatient Plan of Care  Goal: Plan of Care Review  Outcome: Ongoing, Progressing  Goal: Patient-Specific Goal (Individualized)  Outcome: Ongoing, Progressing  Goal: Absence of Hospital-Acquired Illness or Injury  Outcome: Ongoing, Progressing  Goal: Optimal Comfort and Wellbeing  Outcome: Ongoing, Progressing  Goal: Readiness for Transition of Care  Outcome: Ongoing, Progressing     Problem: Infection  Goal: Absence of Infection Signs and Symptoms  Outcome: Ongoing, Progressing     Problem: Impaired Wound Healing  Goal: Optimal Wound Healing  Outcome: Ongoing, Progressing     Problem: Skin Injury Risk Increased  Goal: Skin Health and Integrity  Outcome: Ongoing, Progressing     Problem: Fall Injury Risk  Goal: Absence of Fall and Fall-Related Injury  Outcome: Ongoing, Progressing     Problem: Pain Acute  Goal: Acceptable Pain Control and Functional Ability  Outcome: Ongoing, Progressing     Problem: Fatigue  Goal: Improved Activity Tolerance  Outcome: Ongoing, Progressing     Problem: Adjustment to Illness (Chronic Kidney Disease)  Goal: Optimal Coping with Chronic Illness  Outcome: Ongoing, Progressing     Problem: Electrolyte Imbalance (Chronic Kidney Disease)  Goal: Electrolyte Balance  Outcome: Ongoing, Progressing     Problem: Fluid Volume Excess (Chronic Kidney Disease)  Goal: Fluid Balance  Outcome: Ongoing, Progressing     Problem: Functional Decline (Chronic Kidney Disease)  Goal: Optimal Functional Ability  Outcome: Ongoing, Progressing     Problem: Hematologic Alteration (Chronic Kidney Disease)  Goal: Absence of Anemia Signs and Symptoms  Outcome: Ongoing, Progressing     Problem: Oral Intake Inadequate (Chronic Kidney Disease)  Goal: Optimal Oral Intake  Outcome: Ongoing, Progressing     Problem: Pain (Chronic Kidney Disease)  Goal: Acceptable Pain Control  Outcome: Ongoing, Progressing     Problem: Renal Function Impairment (Chronic Kidney Disease)  Goal: Minimize Renal Failure  Effects  Outcome: Ongoing, Progressing     Problem: Hypertension Acute  Goal: Blood Pressure Within Desired Range  Outcome: Ongoing, Progressing     Problem: UTI (Urinary Tract Infection)  Goal: Improved Infection Symptoms  Outcome: Ongoing, Progressing     Problem: Pain Chronic (Persistent) (Comorbidity Management)  Goal: Acceptable Pain Control and Functional Ability  Outcome: Ongoing, Progressing     Problem: Fluid and Electrolyte Imbalance (Acute Kidney Injury/Impairment)  Goal: Fluid and Electrolyte Balance  Outcome: Ongoing, Progressing     Problem: Oral Intake Inadequate (Acute Kidney Injury/Impairment)  Goal: Optimal Nutrition Intake  Outcome: Ongoing, Progressing     Problem: Renal Function Impairment (Acute Kidney Injury/Impairment)  Goal: Effective Renal Function  Outcome: Ongoing, Progressing     Problem: Activity Intolerance  Goal: Enhanced Capacity and Energy  Outcome: Ongoing, Progressing     Problem: Adjustment to Surgery (Colostomy)  Goal: Optimal Coping  Outcome: Ongoing, Progressing     Problem: Bleeding (Colostomy)  Goal: Absence of Bleeding  Outcome: Ongoing, Progressing     Problem: Fluid, Electrolyte and Nutrition Imbalance (Colostomy)  Goal: Fluid, Electrolyte and Nutrition Balance  Outcome: Ongoing, Progressing     Problem: Infection (Colostomy)  Goal: Absence of Infection Signs and Symptoms  Outcome: Ongoing, Progressing     Problem: Ongoing Anesthesia Effects (Colostomy)  Goal: Anesthesia/Sedation Recovery  Outcome: Ongoing, Progressing     Problem: Pain (Colostomy)  Goal: Acceptable Pain Control  Outcome: Ongoing, Progressing     Problem: Postoperative Nausea and Vomiting (Colostomy)  Goal: Nausea and Vomiting Relief  Outcome: Ongoing, Progressing     Problem: Postoperative Stoma Care (Colostomy)  Goal: Optimal Stoma Healing  Outcome: Ongoing, Progressing     Problem: Postoperative Urinary Retention (Colostomy)  Goal: Effective Urinary Elimination  Outcome: Ongoing, Progressing      Problem: Respiratory Compromise (Colostomy)  Goal: Effective Oxygenation and Ventilation  Outcome: Ongoing, Progressing     Problem: Adjustment to Surgery (Ileostomy)  Goal: Optimal Coping  Outcome: Ongoing, Progressing     Problem: Bleeding (Ileostomy)  Goal: Absence of Bleeding  Outcome: Ongoing, Progressing     Problem: Fluid, Electrolyte and Nutrition Imbalance (Ileostomy)  Goal: Fluid, Electrolyte and Nutrition Balance  Outcome: Ongoing, Progressing     Problem: Infection (Ileostomy)  Goal: Absence of Infection Signs and Symptoms  Outcome: Ongoing, Progressing   Pt progressing towards goals.Telemetry maintained, NSR.Continue iv antibiotics. Iliostomy draining a yellow brown liquid stool.Urostomy draining yellow urine.left nephrostomy tube intact draining yellow urine.IVF complete.safety precautions maintained.bed in low position.rails up x3.call bell in reach.bed alarm in use for pt safety.continue plan of care.

## 2023-12-11 NOTE — ASSESSMENT & PLAN NOTE
Patient with ileostomy, patient reporting increased output recently  Increase oral sodium bicarbonate to 1300mg TID from 650 mg TID

## 2023-12-11 NOTE — PLAN OF CARE
Recommendations     1.) Recommend continuing with Regular diet, as tolerated- encourage intake as needed.      2.) Recommend continuing with Boost VHC BID to help optimize PRO/Kcal intake.      3.) RD to monitor wt, PO intake, skin, labs.        Goals: to meet % of EEN/EPN by next RD f/u  Nutrition Goal Status: new  Communication of RD Recs:  (POC)

## 2023-12-11 NOTE — SUBJECTIVE & OBJECTIVE
Interval History:   No AEON.  Afebrile and WBC WNL.  Feeling better today.  Left PCNT irrigated and bag replaced by Urology team.   Urine cx + GNR      Review of Systems   Constitutional:  Negative for activity change, chills, diaphoresis and fever.   Respiratory:  Negative for cough, shortness of breath and wheezing.    Cardiovascular:  Negative for chest pain.   Gastrointestinal:  Negative for abdominal pain, constipation, diarrhea, nausea and vomiting.   Genitourinary:  Negative for dysuria, frequency and urgency.   Neurological:  Negative for dizziness.   Hematological:  Does not bruise/bleed easily.     Objective:     Vital Signs (Most Recent):  Temp: 97.9 °F (36.6 °C) (12/11/23 1117)  Pulse: 77 (12/11/23 1504)  Resp: 18 (12/11/23 1117)  BP: (!) 114/56 (12/11/23 1117)  SpO2: 97 % (12/11/23 1117) Vital Signs (24h Range):  Temp:  [97.8 °F (36.6 °C)-98.6 °F (37 °C)] 97.9 °F (36.6 °C)  Pulse:  [67-82] 77  Resp:  [16-18] 18  SpO2:  [96 %-100 %] 97 %  BP: ()/(51-58) 114/56     Weight: 81 kg (178 lb 9.2 oz)  Body mass index is 27.97 kg/m².    Estimated Creatinine Clearance: 50.4 mL/min (based on SCr of 1.3 mg/dL).     Physical Exam  Constitutional:       General: She is not in acute distress.     Appearance: Normal appearance. She is well-developed. She is not ill-appearing, toxic-appearing or diaphoretic.   HENT:      Head: Normocephalic and atraumatic.   Cardiovascular:      Rate and Rhythm: Normal rate and regular rhythm.   Pulmonary:      Effort: Pulmonary effort is normal. No respiratory distress.      Breath sounds: Normal breath sounds. No wheezing or rales.   Abdominal:      General: There is no distension.      Palpations: Abdomen is soft.      Tenderness: There is no abdominal tenderness. There is no guarding.      Comments: RLQ ostomy with liquid stool  LLQ ostomy with clear urine   L NT site without surrounding cellulitis   Skin:     General: Skin is warm and dry.   Neurological:      Mental Status:  She is alert and oriented to person, place, and time.   Psychiatric:         Mood and Affect: Mood normal.         Behavior: Behavior normal.         Thought Content: Thought content normal.          Significant Labs: Blood Culture:   Recent Labs   Lab 08/18/23 2043 09/09/23  1806 09/09/23  1807 12/07/23 1953 12/08/23  0155   LABBLOO No growth after 5 days.  No growth after 5 days. No growth after 5 days. No growth after 5 days. No Growth to date  No Growth to date  No Growth to date  No Growth to date  No Growth to date  No Growth to date  No Growth to date  No Growth to date No Growth to date  No Growth to date  No Growth to date  No Growth to date  No Growth to date  No Growth to date  No Growth to date  No Growth to date       CBC:   Recent Labs   Lab 12/10/23  0355 12/11/23  0828   WBC 6.88 5.75   HGB 11.4* 10.9*   HCT 36.6* 34.7*    305       CMP:   Recent Labs   Lab 12/10/23  0355 12/11/23  0828    139   K 3.5 3.6   * 113*   CO2 16* 18*   GLU 89 92   BUN 22* 18   CREATININE 1.6* 1.3   CALCIUM 9.4 9.2   ALBUMIN 2.8*  --    ANIONGAP 9 8       Urine Culture:   Recent Labs   Lab 09/09/23  1416 09/11/23  1226 10/21/23  2018 11/03/23  2119 12/07/23  2131   LABURIN ENTEROBACTER CLOACAE  >100,000 cfu/ml  *  ACINETOBACTER BAUMANNII/HAEMOLYTICUS  50,000 - 99,999 cfu/ml  * ENTEROCOCCUS FAECIUM VRE  10,000 - 49,999 cfu/ml  No other significant isolate  * ENTEROCOCCUS FAECALIS  >100,000 cfu/ml  No other significant isolate  * ESCHERICHIA COLI  >100,000 cfu/ml  * GRAM NEGATIVE BALAJI  >100,000 cfu/ml  Identification and susceptibility pending  *       Urine Studies:   Recent Labs   Lab 12/07/23 2131   COLORU [place]   APPEARANCEUA Cloudy*   PHUR 7.0   SPECGRAV 1.015   PROTEINUA 2+*   GLUCUA Negative   KETONESU Negative   BILIRUBINUA Negative   OCCULTUA 1+*   NITRITE Negative   LEUKOCYTESUR 3+*   RBCUA 43*   WBCUA >100*   BACTERIA Many*   SQUAMEPITHEL 4   HYALINECASTS 0       All pertinent  labs within the past 24 hours have been reviewed.    Significant Imaging: I have reviewed all pertinent imaging results/findings within the past 24 hours.  Procedure Component Value Units Date/Time   CT Abdomen Pelvis Without Contrast [[id number]] Resulted: 12/09/23 1516   Order Status: Completed Updated: 12/09/23 1519   Narrative:     EXAMINATION:  CT ABDOMEN PELVIS WITHOUT CONTRAST    CLINICAL HISTORY:  Pyelonephritis ;    TECHNIQUE:  Low dose axial images, sagittal and coronal reformations were obtained from the lung bases to the pubic symphysis.  Oral contrast was not administered.    COMPARISON:  11/03/2023.    FINDINGS:  There are linear opacities within the lung bases consistent with linear atelectasis.  No pleural effusions are identified.  Bony structures appear intact.  There is no evidence for acute fracture or bone destruction.  The liver appears mildly enlarged.  The liver is homogeneous in density with no focal liver lesions identified.  The gallbladder is absent.  The spleen, stomach, and pancreas all appear unremarkable.  The adrenal glands are not enlarged.  There is a left-sided nephrostomy tube present.  There is bilateral hydronephrosis present.  There is a small air bubble identified within the left renal collecting system.  No renal or ureteral calculi are appreciated.  There are surgical changes related to patient's prior creation of transverse colon conduit for urinary diversion.  The abdominal aorta tapers normally without aneurysmal dilatation.  No para-aortic lymphadenopathy is identified.  The patient has bilateral ostomies.  No dilated loops of bowel are evident.  Patient is status post hysterectomy.  No abnormal adnexal masses are appreciated.  The urinary bladder is decompressed.  There is no evidence for pelvic or inguinal lymphadenopathy.   Impression:       Surgical changes with bilateral abdominal ostomies present as before.    Bilateral hydronephrosis and proximal hydroureter  despite the presence of a left sided nephrostomy tube which appears in satisfactory position.  Note the patient has a history of bilateral ureteral obstruction with creation of transverse colon conduit.    Mild hepatomegaly.    S/p hysterectomy.  Status post cholecystectomy.      Electronically signed by: Oj Del Rio MD  Date: 12/09/2023  Time: 15:16   US Retroperitoneal Complete [[id number]] Resulted: 12/09/23 1053   Order Status: Completed Updated: 12/09/23 1055   Narrative:     EXAMINATION:  US RETROPERITONEAL COMPLETE    CLINICAL HISTORY:  ODESSA;    TECHNIQUE:  Ultrasound of the kidneys and urinary bladder was performed including color flow and Doppler evaluation of the kidneys.    COMPARISON:  CT abdomen pelvis 11/03/2023.  Retroperitoneal ultrasound 06/01/2023.    FINDINGS:  Right kidney: The right kidney measures 12.3 cm. No cortical thinning. No loss of corticomedullary distinction. Resistive index measures 0.78.  No mass. No renal stone. Moderate hydronephrosis.    Left kidney: The left kidney measures 12.6 cm.   No cortical thinning. No loss of corticomedullary distinction. Resistive index measures 0.79.  No mass. No renal stone.Left nephrostomy tube.  Minimal residual caliectasis, without overt hydronephrosis.    Reported urostomy.  The bladder is not well visualized.    Splenic resistive index measures 0.56.   Impression:       Moderate right hydronephrosis, similar to prior.    Left nephrostomy.  No overt left hydronephrosis.    Electronically signed by resident: John Juarez  Date: 12/09/2023  Time: 10:14    Electronically signed by: Diomedes Chung Jr  Date: 12/09/2023  Time: 10:53     Imaging History    2023    Date Procedure Name Study Review Link PACS Link Status Accession Number Location   12/09/23 11:20 AM CT Abdomen Pelvis  Without Contrast Study Review  Images Final [id number] [facility]   12/09/23 09:46 AM US Retroperitoneal Complete Study Review  Images Final [id number] [facility]   12/09/23 02:14 PM  CARDIAC MONITORING STRIPS Study Review  Final

## 2023-12-11 NOTE — SUBJECTIVE & OBJECTIVE
Past Medical History:   Diagnosis Date    Abnormal mammogram 08/25/2020    Abnormal mammogram 08/25/2020    Acute blood loss anemia     Acute deep vein thrombosis (DVT) of lower extremity 12/09/2020    Advance care planning 04/30/2021    ODESSA (acute kidney injury) 03/21/2020    Anemia due to chronic blood loss     Anemia due to chronic kidney disease     Anemia due to chronic kidney disease 05/18/2020    Anxiety     Bilateral ureteral obstruction 09/11/2020    Bilious vomiting with nausea 06/11/2023    Cardiovascular event risk -- low 09/14/2015    ASCVD 10-year risk 1.9% (with optimal risk factors 1.3%) as of 9/14/2015     Cervical cancer 2014    Chronic back pain     Colostomy care     Deep vein thrombosis     Depression     Diarrhea due to malabsorption 11/14/2018    Difficult intubation     Discharge planning issues 04/30/2021    Disorder of kidney and ureter     DVT of lower extremity, bilateral 11/04/2020    Edema 04/10/2023    Emphysema of lung 04/10/2023    Fibromyalgia     Fungemia 09/27/2020    Generalized abdominal pain 08/25/2020    GERD (gastroesophageal reflux disease)     Hemifacial spasm 09/16/2015    Hiatal hernia 2014    History of cervical cancer 10/11/2018    History of essential hypertension 05/04/2015    Not requiring medications at this time  BP is low- concern that this may correlate with increased stool output from stoma. Encourage her to contact GI and her PCP.    Hx of psychiatric care     Cymbalta, trazodone    Hypertension     Hypomagnesemia 11/21/2018    Hyponatremia 09/10/2023    Impaired functional mobility, balance, gait, and endurance 07/24/2015    Lactose intolerance     Major depressive disorder, recurrent episode, moderate 06/02/2023    Metastatic squamous cell carcinoma to lymph node 10/11/2018    Moderate episode of recurrent major depressive disorder 04/21/2021    Nephrostomy complication 10/26/2023    Neuropathy due to chemotherapeutic drug 11/14/2018    Osteoarthritis of back      Peritonitis 09/22/2020    Physical deconditioning 04/30/2021    Pseudomonas urinary tract infection 04/21/2021    Psychiatric problem     Pyelitis 09/09/2023    QT prolongation 04/16/2021    Refusal of blood transfusions as patient is Mandaeism     Schatzki's ring 09/14/2015    Seen on outside EGD 05/2014, underwent esophageal dilatation. Bx were negative.     Seizure-like activity 12/02/2018    Seizures     Sleep stage dysfunction     Noted on PSG 06/2017; negative for obstructive sleep apnea     Stroke     Urinary tract infection associated with nephrostomy catheter 06/11/2020    Wound infection after surgery 09/24/2020       Past Surgical History:   Procedure Laterality Date    ANTEGRADE NEPHROSTOGRAPHY Bilateral 9/28/2020    Procedure: Nephrostogram - antegrade;  Surgeon: Leslie Balbuena MD;  [facility];  Service: Urology;  Laterality: Bilateral;    ANTEGRADE NEPHROSTOGRAPHY Left 4/20/2021    Procedure: NEPHROSTOGRAM, ANTEGRADE;  Surgeon: Leslie Balbuena MD;  [facility];  Service: Urology;  Laterality: Left;    ANTEGRADE NEPHROSTOGRAPHY Right 10/27/2022    Procedure: NEPHROSTOGRAM, ANTEGRADE;  Surgeon: Chirag Russ MD;  [facility];  Service: Urology;  Laterality: Right;    ANTEGRADE NEPHROSTOGRAPHY Right 4/21/2023    Procedure: NEPHROSTOGRAM, ANTEGRADE;  Surgeon: Chirag Russ MD;  Location: [facility] OR 2ND FLR;  Service: Urology;  Laterality: Right;    ANTEGRADE NEPHROSTOGRAPHY Left 6/6/2023    Procedure: Nephrostogram - antegrade;  Surgeon: Leslie Balbuena MD;  [facility];  Service: Urology;  Laterality: Left;    BILATERAL OOPHORECTOMY  2015    CHOLECYSTECTOMY  11/09/2016    Done at [facility], path showed chronic cholecystitis and gallstones    cold knife conization  2014    COLECTOMY, RIGHT  9/17/2020    Procedure: COLECTOMY, RIGHT Extended;  Surgeon: Hammad Reynolds MD;  Location: [facility] OR 2ND FLR;  Service: Colon and Rectal;;     COLONOSCOPY  2014    COLONOSCOPY N/A 10/24/2016    at [facility], Dr Gutiérrez    COLONOSCOPY N/A 5/18/2018    Procedure: COLONOSCOPY;  Surgeon: Arden Gutiérrez MD;  Location: [facility] (4TH FLR);  Service: Endoscopy;  Laterality: N/A;    COLONOSCOPY N/A 7/28/2020    Procedure: COLONOSCOPY;  Surgeon: Hammad Reynolds MD;  Location: [facility] (4TH FLR);  Service: Colon and Rectal;  Laterality: N/A;  covid test [place] 7/25    CYSTOSCOPY WITH URETEROSCOPY, RETROGRADE PYELOGRAPHY, AND INSERTION OF STENT Bilateral 3/21/2020    Procedure: CYSTOSCOPY, WITH RETROGRADE PYELOGRAM,;  Surgeon: Leslie Balbuena MD;  Location: [facility] OR 1ST FLR;  Service: Urology;  Laterality: Bilateral;    DILATION OF NEPHROSTOMY TRACT Right 10/27/2022    Procedure: DILATION, NEPHROSTOMY TRACT;  Surgeon: Chirag Russ MD;  Location: [facility] OR 1ST FLR;  Service: Urology;  Laterality: Right;    ESOPHAGOGASTRODUODENOSCOPY  2014    ESOPHAGOGASTRODUODENOSCOPY  11/18/2020    ESOPHAGOGASTRODUODENOSCOPY N/A 11/18/2020    Procedure: ESOPHAGOGASTRODUODENOSCOPY (EGD);  Surgeon: Zenon Spencer MD;  Location: [facility];  Service: Endoscopy;  Laterality: N/A;    ESOPHAGOGASTRODUODENOSCOPY N/A 12/11/2020    Procedure: EGD (ESOPHAGOGASTRODUODENOSCOPY);  Surgeon: Juancho Muse MD;  Location: [facility] (2ND FLR);  Service: Endoscopy;  Laterality: N/A;    HYSTERECTOMY  2014    [facility] for cervical cancer    ILEOSTOMY  9/17/2020    Procedure: CREATION, ILEOSTOMY;  Surgeon: Hammad Reynolds MD;  Location: [facility] OR 2ND FLR;  Service: Colon and Rectal;;    LOOPOGRAM N/A 4/20/2021    Procedure: LOOPOGRAM;  Surgeon: Leslie Balbuena MD;  Location: [facility] OR 1ST FLR;  Service: Urology;  Laterality: N/A;    LOOPOGRAM N/A 6/6/2023    Procedure: LOOPOGRAM;  Surgeon: Leslie Balbuena MD;  Location: [facility] OR 1ST FLR;  Service: Urology;  Laterality: N/A;    MOBILIZATION OF SPLENIC FLEXURE N/A 9/11/2020    Procedure: MOBILIZATION, COLONIC;  Surgeon: Hammad Reynolds MD;   Location: [facility] OR 2ND FLR;  Service: Colon and Rectal;  Laterality: N/A;    NEPHROSTOGRAPHY Bilateral 4/17/2021    Procedure: Nephrostogram;  Surgeon: Celeste Surgeon;  Location: [facility];  Service: Anesthesiology;  Laterality: Bilateral;    OOPHORECTOMY      PERCUTANEOUS NEPHROLITHOTOMY Right 4/21/2023    Procedure: NEPHROLITHOTOMY, PERCUTANEOUS;  Surgeon: Chirag Russ MD;  Location: [facility] OR 2ND FLR;  Service: Urology;  Laterality: Right;  2.5 hrs    PERCUTANEOUS NEPHROSTOMY  4/21/2023    Procedure: CREATION, NEPHROSTOMY, PERCUTANEOUS with removal of existing nephrostomy tube;  Surgeon: Chirag Russ MD;  Location: [facility] OR 2ND FLR;  Service: Urology;;    PYELOSCOPY Right 10/27/2022    Procedure: PYELOSCOPY;  Surgeon: Chirag Russ MD;  [facility];  Service: Urology;  Laterality: Right;    REPLACEMENT OF NEPHROSTOMY TUBE Right 10/27/2022    Procedure: REPLACEMENT, NEPHROSTOMY TUBE;  Surgeon: Chirag Russ MD;  [facility];  Service: Urology;  Laterality: Right;    RETROPERITONEAL LYMPHADENECTOMY Right 9/17/2018    Procedure: LYMPHADENECTOMY, RETROPERITONEUM;  Surgeon: Sebas Reed MD;  Location: [facility];  Service: General;  Laterality: Right;  ILIAC    URETEROSCOPIC REMOVAL OF URETERIC CALCULUS Right 10/27/2022    Procedure: REMOVAL, CALCULUS, URETER, URETEROSCOPIC;  Surgeon: Chirag Russ MD;  [facility];  Service: Urology;  Laterality: Right;    URETEROSCOPY Right 10/27/2022    Procedure: URETEROSCOPY-ANTEGRADE;  Surgeon: Chirag Russ MD;  Location: [facility];  Service: Urology;  Laterality: Right;       Review of patient's allergies indicates:   Allergen Reactions    Bee sting [allergen ext-venom-honey bee]      Rash      Grass pollen-bermuda, standard      rash       Family History       Problem Relation (Age of Onset)    Breast cancer Maternal Aunt    Cancer Father, Mother    Cervical cancer Mother    Colon cancer  Father    Drug abuse Daughter, Daughter    Esophageal cancer Father    Heart disease Sister, Maternal Grandmother    Suicide Daughter            Tobacco Use    Smoking status: Never    Smokeless tobacco: Never   Substance and Sexual Activity    Alcohol use: No     Alcohol/week: 0.0 standard drinks of alcohol    Drug use: No    Sexual activity: Yes     Partners: Male     Birth control/protection: None     Comment:  19 years since 1999       Review of Systems   Constitutional:  Negative for chills and fever.   HENT:  Negative for sore throat and trouble swallowing.    Eyes:  Negative for pain and discharge.   Respiratory:  Negative for shortness of breath and wheezing.    Cardiovascular:  Negative for chest pain and palpitations.   Gastrointestinal:  Negative for abdominal pain, diarrhea, nausea and vomiting.   Genitourinary:  Negative for flank pain.        As per HPI   Musculoskeletal:  Negative for back pain and joint swelling.   Skin:  Negative for rash and wound.   Neurological:  Negative for seizures, weakness and headaches.   Psychiatric/Behavioral:  Negative for hallucinations. The patient is not nervous/anxious.        Objective:     Temp:  [97.8 °F (36.6 °C)-98.6 °F (37 °C)] 97.9 °F (36.6 °C)  Pulse:  [66-82] 73  Resp:  [16-18] 18  SpO2:  [96 %-100 %] 97 %  BP: ()/(51-58) 103/51  Weight: 81 kg (178 lb 9.2 oz)  Body mass index is 27.97 kg/m².           Drains       Drain  Duration                  Urostomy 09/11/20 0000 ileal conduit LLQ 1186 days         Ileostomy 09/18/20 RLQ 1179 days         Nephrostomy 10/26/23 0933 Left 12 Fr. 46 days                     Physical Exam  Vitals and nursing note reviewed.   Constitutional:       General: She is not in acute distress.  HENT:      Head: Atraumatic.      Nose: Nose normal.   Eyes:      Extraocular Movements: Extraocular movements intact.   Cardiovascular:      Rate and Rhythm: Normal rate.   Pulmonary:      Effort: Pulmonary effort is normal.    Abdominal:      General: Abdomen is flat. There is no distension.      Tenderness: There is no abdominal tenderness. There is no right CVA tenderness or left CVA tenderness.      Comments: RLQ ostomy with liquid stool, pink and patent ostomy  LLQ ostomy with clear yellow urine, pink and patent ostomy  Left neph tube insertion site without erythema, nontender to palpation      Musculoskeletal:         General: Normal range of motion.      Cervical back: Normal range of motion.   Skin:     Coloration: Skin is not jaundiced.   Neurological:      General: No focal deficit present.      Mental Status: She is alert and oriented to person, place, and time.   Psychiatric:         Mood and Affect: Mood normal.         Behavior: Behavior normal.          Significant Labs:    BMP:  Recent Labs   Lab 12/09/23  0246 12/10/23  0355 12/11/23 0828    140 139   K 3.6 3.5 3.6   * 115* 113*   CO2 15* 16* 18*   BUN 31* 22* 18   CREATININE 1.9* 1.6* 1.3   CALCIUM 9.4 9.4 9.2       CBC:  Recent Labs   Lab 12/09/23  0246 12/10/23  0355 12/11/23  0828   WBC 10.81 6.88 5.75   HGB 11.9* 11.4* 10.9*   HCT 37.3 36.6* 34.7*    312 305       All pertinent labs results from the past 24 hours have been reviewed.    Significant Imaging:  All pertinent imaging results/findings from the past 24 hours have been reviewed.

## 2023-12-11 NOTE — TELEPHONE ENCOUNTER
Spoke with patient, appt scheduled, reviewed location. Patient is currently in hospital, appt slip mailed per request.

## 2023-12-11 NOTE — CONSULTS
[facility]  Adult Nutrition  Consult Note    SUMMARY     Recommendations    1.) Recommend continuing with Regular diet, as tolerated- encourage intake as needed.     2.) Recommend continuing with Boost VHC BID to help optimize PRO/Kcal intake.     3.) RD to monitor wt, PO intake, skin, labs.      Goals: to meet % of EEN/EPN by next RD f/u  Nutrition Goal Status: new  Communication of RD Recs:  (POC)    Assessment and Plan    Nutrition Problem  Altered nutritionally related labs    Related to (etiology):   Renal disease    Signs and Symptoms (as evidenced by):   GFR: 47     Interventions/Recommendations (treatment strategy):  Collaboration of nutritional care with other providers.  ONS PRN    Nutrition Diagnosis Status:   New     Malnutrition Assessment     Hair/Scalp (Micronutrient): scaly/flaky (dandruff)  Eyes (Micronutrient): none  Gums (Micronutrient): none  Lips/Mucous Membranes (Micronutrient): none  Teeth (Micronutrient): broken dentition  Tongue (Micronutrient): none  Neck/Chest (Micronutrient): none  Musculoskeletal/Lower Extremities: none   Micronutrient Evaluation Summary: no deficiencies   Weight Loss (Malnutrition):  (-4% x 1mo (insig))   Orbital Region (Subcutaneous Fat Loss): mild depletion  Upper Arm Region (Subcutaneous Fat Loss): mild depletion  Thoracic and Lumbar Region: well nourished   [place] Region (Muscle Loss): well nourished  Clavicle Bone Region (Muscle Loss): well nourished  Clavicle and Acromion Bone Region (Muscle Loss): well nourished  Scapular Bone Region (Muscle Loss): well nourished  Dorsal Hand (Muscle Loss): well nourished     Reason for Assessment    Reason For Assessment: consult  Diagnosis:  (Pyelonephritis)  Relevant Medical History: DVT, CKD, cervical CA  Interdisciplinary Rounds: did not attend  General Information Comments: RD consulted for malnutrition. Noted that alb is 2.8 (L) but tPRO was 8.0 (WNL). Pt denies n/v/d/c. Pt endorses fair to good  "appetite, states consuming 75% of their meals (does not like to eat vegetables d/t gas.) Pt stated that they consume their PRO and starch on their plate. Meal preferences were relayed to . Ostomy present. No edema noted. Pt states that #, #. Noted that pt did have a 4% wt loss per chart review, x 1 mo (not sig). NFPE performed on 12/11, noted some mild wasting in arms and orbitals, but no other s/s of malnutrition. Albumin should not be used as a malnutrition indicator: most likely reflects on inflammation and/or fluid shifts. RD to continue to f/u as needed.  Nutrition Discharge Planning: adequate PO intake    Nutrition Risk Screen    Nutrition Risk Screen: no indicators present    Nutrition/Diet History    Patient Reported Diet/Restrictions/Preferences: general  Typical Food/Fluid Intake: 2-3 meals- states that they may skip breakfast  Spiritual, Cultural Beliefs, Taoist Practices, Values that Affect Care: no    Anthropometrics    Temp: 97.9 °F (36.6 °C)  Height: 5' 7" (170.2 cm)  Height (inches): 67 in  Weight Method: Bed Scale  Weight: 81 kg (178 lb 9.2 oz)  Weight (lb): 178.57 lb  Ideal Body Weight (IBW), Female: 135 lb  % Ideal Body Weight, Female (lb): 135.87 %  BMI (Calculated): 28  BMI Grade: 25 - 29.9 - overweight    Lab/Procedures/Meds    Pertinent Labs Reviewed: reviewed  Pertinent Labs Comments: GFR: 47.1, ALP: 141, alb: 2.8 (12/10), tPRO: 8.0 (12/7- WNL)  Pertinent Medications Reviewed: reviewed  Pertinent Medications Comments: Abx, Na Bicarb, enoxaparin    Estimated/Assessed Needs    Weight Used For Calorie Calculations: 81 kg (178 lb 9.2 oz)  Energy Calorie Requirements (kcal): 1694 kcal  Energy Need Method: [place] (MSJ x 1.2)  Protein Requirements: 65- 81g (0.8-1.0g/kg)  Weight Used For Protein Calculations: 81 kg (178 lb 9.2 oz)  Fluid Requirements (mL): 1ml/1kcal or per MD  Estimated Fluid Requirement Method: RDA Method  RDA Method (mL): " 1694    Nutrition Prescription Ordered    Current Diet Order: Regular diet  Oral Nutrition Supplement: Boost BID    Evaluation of Received Nutrient/Fluid Intake    I/O: -3.2L since admit  Energy Calories Required: meeting needs  Protein Required: meeting needs  Fluid Required:  (as per MD)  Comments: LBM 12/10  Tolerance: tolerating  % Intake of Estimated Energy Needs: Other: %  % Meal Intake: 75 - 100 %    Nutrition Risk    Level of Risk/Frequency of Follow-up:  (RD to f/u x 1/week)     Monitor and Evaluation    Food and Nutrient Intake: energy intake, food and beverage intake  Food and Nutrient Adminstration: diet order  Physical Activity and Function: nutrition-related ADLs and IADLs  Anthropometric Measurements: weight, weight change, body mass index  Biochemical Data, Medical Tests and Procedures: electrolyte and renal panel, gastrointestinal profile, glucose/endocrine profile, inflammatory profile, lipid profile  Nutrition-Focused Physical Findings: overall appearance, skin     Nutrition Follow-Up    RD Follow-up?: Yes

## 2023-12-11 NOTE — ASSESSMENT & PLAN NOTE
57 yo female with Hx of Cervical Cancer s/p XRT & Bilateral Ureteral strictures with obstructive uropathy now s/p transverse colon conduit, s/p Left Nephrostomy tube, s/p Ileostomy, hx of MDR UTI/pyelonephritis presents to the ED with complaints of abdominal pain and c/f pyelonephritis.  UA + and UCX with GNR from L nephrostomy tube.  Blood cultures NGTD. Patient is on Cefepime. Afebrile. Leukocytosis resolved. Clinically improved. Urology has been consulted.    Recommendations  Continue cefepime  F/U urine culture results   Follow up urology recommendations. Left NT management per team  ID  will follow

## 2023-12-11 NOTE — ASSESSMENT & PLAN NOTE
Patient with hx of MDRO and VRE and recent bouts pyelonephritis, with similar symptoms, epigastric tenderness, leukocytosis, pyuria  ID Consulted. Continue cefepime.  Urine cx growing GNR, final results pending   Blood cx with NGTD  CT A/P w/o contrast with stable hydronephrosis BL but no mention perinephric fat stranding   Per ID, will need L PCNT exchanged once cultures known and abx tailored   Consulted urology. Per urology, L PCNT found to be clogged with immediate return of 200cc of c/y/u with mucous following flushing and change of agnieszka bag  No indications for urologic interventions at this time  Nursing to flush nephrostomy tube PRN  Recommend continued PCNT exchanges with IR q6-8weeks  Urology plans to coordinate schedules with CRS for joint anastomotic revision surgery in the near future  Recommend consult to CRS while inpt to reestablish care and for possible future surgical planning  Recommend PT/OT, ambulation while in hospital  Recommend dietary consult and boost shakes TID to optimize albumin prior to surgery

## 2023-12-11 NOTE — HPI
Edita Hardin [facility] is a 58 y.o. female with history of bilateral ureteral stricture secondary to XRT for cervical cancer, s/p open transverse colon conduit urinary diversion on 9/11/2020.  Urology consulted for b/l hydronephrosis with UTI.     She has a complicated urologic history and is s/p R PCNL on 4/21. She passed a clamping trial and was able to have the right nephrostomy tube removed.  The left is still in place, she had an antegrade nephrostogram and a loopogram 6/6/2023 which showed reflux on the right and left distal ureteral stricture beginning at the level of the inferior portion of L4 with no contrast opacifying the ureter past this point. The nephrostomy tube was changed on 10/2023 while admitted. She has had multiple episodes of pyelonephritis since August 2023. She was tentatively scheduled for anastomosis revision with Dr. Balbuena in November, though this was delayed due to coordination with CRS.     On presentation she reported weakness and malaise along with n/v. She denies subjective fevers, chills, or flank pain.  No issues with drainage of the L nephrostomy tube and reports clear yellow urine. Normal ostomy output for stool and urine, respectively.     Upon assessment, she is resting comfortably without pain.     Labs 12/11/23: WBC 5.7 Hgb 10.9 Cr 1.3 (baseline around 1.1-1.2).      Ucx with GNR's (previously e.coli), Bcx with NGTD, CT 12/9/23 with stable moderate right hydro and left neph tube in good position along with hydronephrosis. Pouch does not appear to be distended on imaging.

## 2023-12-11 NOTE — ASSESSMENT & PLAN NOTE
Edita Riley is a 58 y.o. female with hx of as above who presents with n/v and weakness, found to have a UTI along with b/l hydronephrosis.    - Strict I's/O's  - Trend Cr, avoid nephrotoxic agents, and dose medications to patient's current GFR  - F/u Ucx, tailor abx as needed  - ID consulted, f/u recs for outpt abx  - CT with stable right hydro (2/2 reflux)  - L PCNT found to be clogged with immediate return of 200cc of c/y/u with mucous following flushing and change of agnieszka bag  - No indications for urologic interventions at this time  - Nursing to flush nephrostomy tube PRN  - Recommend continued PCNT exchanges with IR q6-8weeks  - We will coordinate schedules with CRS for joint anastomotic revision surgery in the near future  - Recommend PT/OT, ambulation while in hospital  - Recommend dietary consult and boost shakes TID to optimize albumin prior to surgery  - All remaining care per primary team

## 2023-12-11 NOTE — PROGRESS NOTES
[facility]  Infectious Disease  Progress Note    Patient Name: Edita Riley  MRN: [id number]  Admission Date: 12/7/2023  Length of Stay: 3 days  Attending Physician: Jamar Campbell MD  Primary Care Provider: Trina Vu MD    Isolation Status: No active isolations  Assessment/Plan:      ID  * Pyelonephritis  59 yo female with Hx of Cervical Cancer s/p XRT & Bilateral Ureteral strictures with obstructive uropathy now s/p transverse colon conduit, s/p Left Nephrostomy tube, s/p Ileostomy, hx of MDR UTI/pyelonephritis presents to the ED with complaints of abdominal pain and c/f pyelonephritis.  UA + and UCX with GNR from L nephrostomy tube.  Blood cultures NGTD. Patient is on Cefepime. Afebrile. Leukocytosis resolved. Clinically improved. Urology has been consulted.    Recommendations  Continue cefepime  F/U urine culture results   Follow up urology recommendations. Left NT management per team  ID  will follow        Thank you for the consult. Please secure chat for any questions.  Megha Zhang PA-C      Subjective:     Principal Problem:Pyelonephritis    HPI: 57 y/o AAF hx of Cervical Cancer s/p XRT & Bilateral Ureteral strictures with obstructive uropathy now s/p transverse colon conduit, s/p Left Nephrostomy tube, s/p Ileostomy, hx of MDR UTI/pyelonephritis presents to the ED with complaints of abdominal pain.  Since Monday 12/4, she has had recurrent nausea vomiting over 3 days, poor diet/oral intake with associated weakness.      2 recent hospitalizations with left sided pyelonephritis, prior urine cultures show different MDR bacteria that have grown.  She is pending evaluation by colo-rectal surgery for outpatient revision of urostomy, follows with Dr. Balbuena (urology)      Reported having epigastric pain 10/10 on arrival in ED  9/10 only mild relief with initial morphine dosage.      Patient admitted and UA/UCx obtained.  UA with WBC >100, 3+ leukocytes.  Started on  cefepime.  Blood cultures NGTD. WBC 13-14.  ID consulted for abx recs.  No imaging done.    Patient reports feeling a little better.  Still with L sided back pain.  The patient denies any recent fever, chills, or sweats.  N/V resolved.    Interval History:   No AEON.  Afebrile and WBC WNL.  Feeling better today.  Left PCNT irrigated and bag replaced by Urology team.   Urine cx + GNR      Review of Systems   Constitutional:  Negative for activity change, chills, diaphoresis and fever.   Respiratory:  Negative for cough, shortness of breath and wheezing.    Cardiovascular:  Negative for chest pain.   Gastrointestinal:  Negative for abdominal pain, constipation, diarrhea, nausea and vomiting.   Genitourinary:  Negative for dysuria, frequency and urgency.   Neurological:  Negative for dizziness.   Hematological:  Does not bruise/bleed easily.     Objective:     Vital Signs (Most Recent):  Temp: 97.9 °F (36.6 °C) (12/11/23 1117)  Pulse: 77 (12/11/23 1504)  Resp: 18 (12/11/23 1117)  BP: (!) 114/56 (12/11/23 1117)  SpO2: 97 % (12/11/23 1117) Vital Signs (24h Range):  Temp:  [97.8 °F (36.6 °C)-98.6 °F (37 °C)] 97.9 °F (36.6 °C)  Pulse:  [67-82] 77  Resp:  [16-18] 18  SpO2:  [96 %-100 %] 97 %  BP: ()/(51-58) 114/56     Weight: 81 kg (178 lb 9.2 oz)  Body mass index is 27.97 kg/m².    Estimated Creatinine Clearance: 50.4 mL/min (based on SCr of 1.3 mg/dL).     Physical Exam  Constitutional:       General: She is not in acute distress.     Appearance: Normal appearance. She is well-developed. She is not ill-appearing, toxic-appearing or diaphoretic.   HENT:      Head: Normocephalic and atraumatic.   Cardiovascular:      Rate and Rhythm: Normal rate and regular rhythm.   Pulmonary:      Effort: Pulmonary effort is normal. No respiratory distress.      Breath sounds: Normal breath sounds. No wheezing or rales.   Abdominal:      General: There is no distension.      Palpations: Abdomen is soft.      Tenderness: There is no  abdominal tenderness. There is no guarding.      Comments: RLQ ostomy with liquid stool  LLQ ostomy with clear urine   L NT site without surrounding cellulitis   Skin:     General: Skin is warm and dry.   Neurological:      Mental Status: She is alert and oriented to person, place, and time.   Psychiatric:         Mood and Affect: Mood normal.         Behavior: Behavior normal.         Thought Content: Thought content normal.          Significant Labs: Blood Culture:   Recent Labs   Lab 08/18/23  2043 09/09/23  1806 09/09/23  1807 12/07/23  1953 12/08/23  0155   LABBLOO No growth after 5 days.  No growth after 5 days. No growth after 5 days. No growth after 5 days. No Growth to date  No Growth to date  No Growth to date  No Growth to date  No Growth to date  No Growth to date  No Growth to date  No Growth to date No Growth to date  No Growth to date  No Growth to date  No Growth to date  No Growth to date  No Growth to date  No Growth to date  No Growth to date       CBC:   Recent Labs   Lab 12/10/23  0355 12/11/23  0828   WBC 6.88 5.75   HGB 11.4* 10.9*   HCT 36.6* 34.7*    305       CMP:   Recent Labs   Lab 12/10/23  0355 12/11/23  0828    139   K 3.5 3.6   * 113*   CO2 16* 18*   GLU 89 92   BUN 22* 18   CREATININE 1.6* 1.3   CALCIUM 9.4 9.2   ALBUMIN 2.8*  --    ANIONGAP 9 8       Urine Culture:   Recent Labs   Lab 09/09/23  1416 09/11/23  1226 10/21/23  2018 11/03/23  2119 12/07/23  2131   LABURIN ENTEROBACTER CLOACAE  >100,000 cfu/ml  *  ACINETOBACTER BAUMANNII/HAEMOLYTICUS  50,000 - 99,999 cfu/ml  * ENTEROCOCCUS FAECIUM VRE  10,000 - 49,999 cfu/ml  No other significant isolate  * ENTEROCOCCUS FAECALIS  >100,000 cfu/ml  No other significant isolate  * ESCHERICHIA COLI  >100,000 cfu/ml  * GRAM NEGATIVE BALAJI  >100,000 cfu/ml  Identification and susceptibility pending  *       Urine Studies:   Recent Labs   Lab 12/07/23 2131   COLORU [place]   APPEARANCEUA Cloudy*   PHUR 7.0    SPECGRAV 1.015   PROTEINUA 2+*   GLUCUA Negative   KETONESU Negative   BILIRUBINUA Negative   OCCULTUA 1+*   NITRITE Negative   LEUKOCYTESUR 3+*   RBCUA 43*   WBCUA >100*   BACTERIA Many*   SQUAMEPITHEL 4   HYALINECASTS 0       All pertinent labs within the past 24 hours have been reviewed.    Significant Imaging: I have reviewed all pertinent imaging results/findings within the past 24 hours.  Procedure Component Value Units Date/Time   CT Abdomen Pelvis Without Contrast [[id number]] Resulted: 12/09/23 1516   Order Status: Completed Updated: 12/09/23 1519   Narrative:     EXAMINATION:  CT ABDOMEN PELVIS WITHOUT CONTRAST    CLINICAL HISTORY:  Pyelonephritis ;    TECHNIQUE:  Low dose axial images, sagittal and coronal reformations were obtained from the lung bases to the pubic symphysis.  Oral contrast was not administered.    COMPARISON:  11/03/2023.    FINDINGS:  There are linear opacities within the lung bases consistent with linear atelectasis.  No pleural effusions are identified.  Bony structures appear intact.  There is no evidence for acute fracture or bone destruction.  The liver appears mildly enlarged.  The liver is homogeneous in density with no focal liver lesions identified.  The gallbladder is absent.  The spleen, stomach, and pancreas all appear unremarkable.  The adrenal glands are not enlarged.  There is a left-sided nephrostomy tube present.  There is bilateral hydronephrosis present.  There is a small air bubble identified within the left renal collecting system.  No renal or ureteral calculi are appreciated.  There are surgical changes related to patient's prior creation of transverse colon conduit for urinary diversion.  The abdominal aorta tapers normally without aneurysmal dilatation.  No para-aortic lymphadenopathy is identified.  The patient has bilateral ostomies.  No dilated loops of bowel are evident.  Patient is status post hysterectomy.  No abnormal adnexal masses are appreciated.   The urinary bladder is decompressed.  There is no evidence for pelvic or inguinal lymphadenopathy.   Impression:       Surgical changes with bilateral abdominal ostomies present as before.    Bilateral hydronephrosis and proximal hydroureter despite the presence of a left sided nephrostomy tube which appears in satisfactory position.  Note the patient has a history of bilateral ureteral obstruction with creation of transverse colon conduit.    Mild hepatomegaly.    S/p hysterectomy.  Status post cholecystectomy.      Electronically signed by: Oj Del Rio MD  Date: 12/09/2023  Time: 15:16   US Retroperitoneal Complete [[id number]] Resulted: 12/09/23 1053   Order Status: Completed Updated: 12/09/23 1055   Narrative:     EXAMINATION:  US RETROPERITONEAL COMPLETE    CLINICAL HISTORY:  ODESSA;    TECHNIQUE:  Ultrasound of the kidneys and urinary bladder was performed including color flow and Doppler evaluation of the kidneys.    COMPARISON:  CT abdomen pelvis 11/03/2023.  Retroperitoneal ultrasound 06/01/2023.    FINDINGS:  Right kidney: The right kidney measures 12.3 cm. No cortical thinning. No loss of corticomedullary distinction. Resistive index measures 0.78.  No mass. No renal stone. Moderate hydronephrosis.    Left kidney: The left kidney measures 12.6 cm.   No cortical thinning. No loss of corticomedullary distinction. Resistive index measures 0.79.  No mass. No renal stone.Left nephrostomy tube.  Minimal residual caliectasis, without overt hydronephrosis.    Reported urostomy.  The bladder is not well visualized.    Splenic resistive index measures 0.56.   Impression:       Moderate right hydronephrosis, similar to prior.    Left nephrostomy.  No overt left hydronephrosis.    Electronically signed by resident: John Juarez  Date: 12/09/2023  Time: 10:14    Electronically signed by: Diomedes Chung Jr  Date: 12/09/2023  Time: 10:53     Imaging History    2023    Date Procedure Name Study Review Link PACS Link  Status Accession Number Location   12/09/23 11:20 AM CT Abdomen Pelvis  Without Contrast Study Review  Images Final [id number] [facility]   12/09/23 09:46 AM US Retroperitoneal Complete Study Review  Images Final [id number] [facility]   12/09/23 02:14 PM CARDIAC MONITORING STRIPS Study Review  Final

## 2023-12-11 NOTE — CONSULTS
[facility]  Urology  Consult Note    Patient Name: Edita Riley  MRN: [id number]  Admission Date: 12/7/2023  Hospital Length of Stay: 3   Code Status: Full Code   Attending Provider: Jamar Cmapbell MD   Consulting Provider: Rufus Dunn MD  Primary Care Physician: Trina Vu MD  Principal Problem:Pyelonephritis    Inpatient consult to Urology  Consult performed by: Rufus Dunn MD  Consult ordered by: Jamar Campbell MD          Subjective:     HPI:  Edita Riley is a 58 y.o. female with history of bilateral ureteral stricture secondary to XRT for cervical cancer, s/p open transverse colon conduit urinary diversion on 9/11/2020.  Urology consulted for b/l hydronephrosis with UTI.     She has a complicated urologic history and is s/p R PCNL on 4/21. She passed a clamping trial and was able to have the right nephrostomy tube removed.  The left is still in place, she had an antegrade nephrostogram and a loopogram 6/6/2023 which showed reflux on the right and left distal ureteral stricture beginning at the level of the inferior portion of L4 with no contrast opacifying the ureter past this point. The nephrostomy tube was changed on 10/2023 while admitted. She has had multiple episodes of pyelonephritis since August 2023. She was tentatively scheduled for anastomosis revision with Dr. Balbuena in November, though this was delayed due to coordination with CRS.     On presentation she reported weakness and malaise along with n/v. She denies subjective fevers, chills, or flank pain.  No issues with drainage of the L nephrostomy tube and reports clear yellow urine. Normal ostomy output for stool and urine, respectively.     Upon assessment, she is resting comfortably without pain.     Labs 12/11/23: WBC 5.7 Hgb 10.9 Cr 1.3 (baseline around 1.1-1.2). FENa: pre-renal this admission.      Ucx with GNR's (previously e.coli), Bcx with NGTD, CT 12/9/23 with stable moderate  right hydro and left neph tube in good position along with hydronephrosis. Pouch does not appear to be distended on imaging.     Past Medical History:   Diagnosis Date    Abnormal mammogram 08/25/2020    Abnormal mammogram 08/25/2020    Acute blood loss anemia     Acute deep vein thrombosis (DVT) of lower extremity 12/09/2020    Advance care planning 04/30/2021    ODESSA (acute kidney injury) 03/21/2020    Anemia due to chronic blood loss     Anemia due to chronic kidney disease     Anemia due to chronic kidney disease 05/18/2020    Anxiety     Bilateral ureteral obstruction 09/11/2020    Bilious vomiting with nausea 06/11/2023    Cardiovascular event risk -- low 09/14/2015    ASCVD 10-year risk 1.9% (with optimal risk factors 1.3%) as of 9/14/2015     Cervical cancer 2014    Chronic back pain     Colostomy care     Deep vein thrombosis     Depression     Diarrhea due to malabsorption 11/14/2018    Difficult intubation     Discharge planning issues 04/30/2021    Disorder of kidney and ureter     DVT of lower extremity, bilateral 11/04/2020    Edema 04/10/2023    Emphysema of lung 04/10/2023    Fibromyalgia     Fungemia 09/27/2020    Generalized abdominal pain 08/25/2020    GERD (gastroesophageal reflux disease)     Hemifacial spasm 09/16/2015    Hiatal hernia 2014    History of cervical cancer 10/11/2018    History of essential hypertension 05/04/2015    Not requiring medications at this time  BP is low- concern that this may correlate with increased stool output from stoma. Encourage her to contact GI and her PCP.    Hx of psychiatric care     Cymbalta, trazodone    Hypertension     Hypomagnesemia 11/21/2018    Hyponatremia 09/10/2023    Impaired functional mobility, balance, gait, and endurance 07/24/2015    Lactose intolerance     Major depressive disorder, recurrent episode, moderate 06/02/2023    Metastatic squamous cell carcinoma to lymph node 10/11/2018    Moderate episode of recurrent major depressive disorder  04/21/2021    Nephrostomy complication 10/26/2023    Neuropathy due to chemotherapeutic drug 11/14/2018    Osteoarthritis of back     Peritonitis 09/22/2020    Physical deconditioning 04/30/2021    Pseudomonas urinary tract infection 04/21/2021    Psychiatric problem     Pyelitis 09/09/2023    QT prolongation 04/16/2021    Refusal of blood transfusions as patient is [place]     Schatzki's ring 09/14/2015    Seen on outside EGD 05/2014, underwent esophageal dilatation. Bx were negative.     Seizure-like activity 12/02/2018    Seizures     Sleep stage dysfunction     Noted on PSG 06/2017; negative for obstructive sleep apnea     Stroke     Urinary tract infection associated with nephrostomy catheter 06/11/2020    Wound infection after surgery 09/24/2020       Past Surgical History:   Procedure Laterality Date    ANTEGRADE NEPHROSTOGRAPHY Bilateral 9/28/2020    Procedure: Nephrostogram - antegrade;  Surgeon: Leslie Balbuena MD;  Location: [facility] OR [address] [place] [address];  Service: Urology;  Laterality: Bilateral;    ANTEGRADE NEPHROSTOGRAPHY Left 4/20/2021    Procedure: NEPHROSTOGRAM, ANTEGRADE;  Surgeon: Leslie Balbuena MD;  [facility];  Service: Urology;  Laterality: Left;    ANTEGRADE NEPHROSTOGRAPHY Right 10/27/2022    Procedure: NEPHROSTOGRAM, ANTEGRADE;  Surgeon: Chirag Russ MD;  [facility];  Service: Urology;  Laterality: Right;    ANTEGRADE NEPHROSTOGRAPHY Right 4/21/2023    Procedure: NEPHROSTOGRAM, ANTEGRADE;  Surgeon: Chirag Russ MD;  Location: [facility] OR 2ND FLR;  Service: Urology;  Laterality: Right;    ANTEGRADE NEPHROSTOGRAPHY Left 6/6/2023    Procedure: Nephrostogram - antegrade;  Surgeon: Leslie Balbuena MD;  [facility];  Service: Urology;  Laterality: Left;    BILATERAL OOPHORECTOMY  2015    CHOLECYSTECTOMY  11/09/2016    Done at [facility], path showed chronic cholecystitis and gallstones    cold knife conization  2014    COLECTOMY, RIGHT   9/17/2020    Procedure: COLECTOMY, RIGHT Extended;  Surgeon: Hammad Reynolds MD;  Location: [facility] OR 2ND FLR;  Service: Colon and Rectal;;    COLONOSCOPY  2014    COLONOSCOPY N/A 10/24/2016    at [facility], Dr Gutiérrez    COLONOSCOPY N/A 5/18/2018    Procedure: COLONOSCOPY;  Surgeon: Arden Gutiérrez MD;  Location: [facility] (4TH FLR);  Service: Endoscopy;  Laterality: N/A;    COLONOSCOPY N/A 7/28/2020    Procedure: COLONOSCOPY;  Surgeon: Hammad Reynolds MD;  Location: [facility] (4TH FLR);  Service: Colon and Rectal;  Laterality: N/A;  covid test [place] 7/25    CYSTOSCOPY WITH URETEROSCOPY, RETROGRADE PYELOGRAPHY, AND INSERTION OF STENT Bilateral 3/21/2020    Procedure: CYSTOSCOPY, WITH RETROGRADE PYELOGRAM,;  Surgeon: Leslie Balbuena MD;  Location: [facility] OR 1ST FLR;  Service: Urology;  Laterality: Bilateral;    DILATION OF NEPHROSTOMY TRACT Right 10/27/2022    Procedure: DILATION, NEPHROSTOMY TRACT;  Surgeon: Chirag Russ MD;  Location: [facility] OR 1ST FLR;  Service: Urology;  Laterality: Right;    ESOPHAGOGASTRODUODENOSCOPY  2014    ESOPHAGOGASTRODUODENOSCOPY  11/18/2020    ESOPHAGOGASTRODUODENOSCOPY N/A 11/18/2020    Procedure: ESOPHAGOGASTRODUODENOSCOPY (EGD);  Surgeon: Zenon Spencer MD;  Location: [facility];  Service: Endoscopy;  Laterality: N/A;    ESOPHAGOGASTRODUODENOSCOPY N/A 12/11/2020    Procedure: EGD (ESOPHAGOGASTRODUODENOSCOPY);  Surgeon: Juancho Muse MD;  Location: [facility] (2ND FLR);  Service: Endoscopy;  Laterality: N/A;    HYSTERECTOMY  2014    [facility] for cervical cancer    ILEOSTOMY  9/17/2020    Procedure: CREATION, ILEOSTOMY;  Surgeon: Hammad Reynolds MD;  Location: [facility] OR 2ND FLR;  Service: Colon and Rectal;;    LOOPOGRAM N/A 4/20/2021    Procedure: LOOPOGRAM;  Surgeon: Leslie Balbuena MD;  Location: [facility] OR 1ST FLR;  Service: Urology;  Laterality: N/A;    LOOPOGRAM N/A 6/6/2023    Procedure: LOOPOGRAM;  Surgeon: Leslie Balbuena MD;  Location: [facility] OR [address] [place] [address];  Service: Urology;   Laterality: N/A;    MOBILIZATION OF SPLENIC FLEXURE N/A 9/11/2020    Procedure: MOBILIZATION, COLONIC;  Surgeon: Hammad Reynolds MD;  Location: [facility] OR 2ND FLR;  Service: Colon and Rectal;  Laterality: N/A;    NEPHROSTOGRAPHY Bilateral 4/17/2021    Procedure: Nephrostogram;  Surgeon: Celeste Surgeon;  Location: [facility];  Service: Anesthesiology;  Laterality: Bilateral;    OOPHORECTOMY      PERCUTANEOUS NEPHROLITHOTOMY Right 4/21/2023    Procedure: NEPHROLITHOTOMY, PERCUTANEOUS;  Surgeon: Chirag Russ MD;  Location: [facility] OR 2ND FLR;  Service: Urology;  Laterality: Right;  2.5 hrs    PERCUTANEOUS NEPHROSTOMY  4/21/2023    Procedure: CREATION, NEPHROSTOMY, PERCUTANEOUS with removal of existing nephrostomy tube;  Surgeon: Chirag Russ MD;  Location: [facility] OR 2ND FLR;  Service: Urology;;    PYELOSCOPY Right 10/27/2022    Procedure: PYELOSCOPY;  Surgeon: Chirag Russ MD;  [facility];  Service: Urology;  Laterality: Right;    REPLACEMENT OF NEPHROSTOMY TUBE Right 10/27/2022    Procedure: REPLACEMENT, NEPHROSTOMY TUBE;  Surgeon: Chirag Russ MD;  [facility];  Service: Urology;  Laterality: Right;    RETROPERITONEAL LYMPHADENECTOMY Right 9/17/2018    Procedure: LYMPHADENECTOMY, RETROPERITONEUM;  Surgeon: Sebas Reed MD;  Location: [facility] OR [facility] FLR;  Service: General;  Laterality: Right;  ILIAC    URETEROSCOPIC REMOVAL OF URETERIC CALCULUS Right 10/27/2022    Procedure: REMOVAL, CALCULUS, URETER, URETEROSCOPIC;  Surgeon: Chirag uRss MD;  Location: [facility] OR 1ST FLR;  Service: Urology;  Laterality: Right;    URETEROSCOPY Right 10/27/2022    Procedure: URETEROSCOPY-ANTEGRADE;  Surgeon: Chirag Russ MD;  Location: [facility] OR 1ST FLR;  Service: Urology;  Laterality: Right;       Review of patient's allergies indicates:   Allergen Reactions    Bee sting [allergen ext-venom-honey bee]      Rash      Grass pollen-bermuda, standard      rash       Family History        Problem Relation (Age of Onset)    Breast cancer Maternal Aunt    Cancer Father, Mother    Cervical cancer Mother    Colon cancer Father    Drug abuse Daughter, Daughter    Esophageal cancer Father    Heart disease Sister, Maternal Grandmother    Suicide Daughter            Tobacco Use    Smoking status: Never    Smokeless tobacco: Never   Substance and Sexual Activity    Alcohol use: No     Alcohol/week: 0.0 standard drinks of alcohol    Drug use: No    Sexual activity: Yes     Partners: Male     Birth control/protection: None     Comment:  19 years since 1999       Review of Systems   Constitutional:  Negative for chills and fever.   HENT:  Negative for sore throat and trouble swallowing.    Eyes:  Negative for pain and discharge.   Respiratory:  Negative for shortness of breath and wheezing.    Cardiovascular:  Negative for chest pain and palpitations.   Gastrointestinal:  Negative for abdominal pain, diarrhea, nausea and vomiting.   Genitourinary:  Negative for flank pain.        As per HPI   Musculoskeletal:  Negative for back pain and joint swelling.   Skin:  Negative for rash and wound.   Neurological:  Negative for seizures, weakness and headaches.   Psychiatric/Behavioral:  Negative for hallucinations. The patient is not nervous/anxious.        Objective:     Temp:  [97.8 °F (36.6 °C)-98.6 °F (37 °C)] 97.9 °F (36.6 °C)  Pulse:  [66-82] 73  Resp:  [16-18] 18  SpO2:  [96 %-100 %] 97 %  BP: ()/(51-58) 103/51  Weight: 81 kg (178 lb 9.2 oz)  Body mass index is 27.97 kg/m².           Drains       Drain  Duration                  Urostomy 09/11/20 0000 ileal conduit LLQ 1186 days         Ileostomy 09/18/20 RLQ 1179 days         Nephrostomy 10/26/23 0933 Left 12 Fr. 46 days                     Physical Exam  Vitals and nursing note reviewed.   Constitutional:       General: She is not in acute distress.  HENT:      Head: Atraumatic.      Nose: Nose normal.   Eyes:      Extraocular Movements:  Extraocular movements intact.   Cardiovascular:      Rate and Rhythm: Normal rate.   Pulmonary:      Effort: Pulmonary effort is normal.   Abdominal:      General: Abdomen is flat. There is no distension.      Tenderness: There is no abdominal tenderness. There is no right CVA tenderness or left CVA tenderness.      Comments: RLQ ostomy with liquid stool, pink and patent ostomy  LLQ ostomy with clear yellow urine, pink and patent ostomy, no warmth or erythema around ostomy  Left neph tube insertion site without erythema, nontender to palpation      Musculoskeletal:         General: Normal range of motion.      Cervical back: Normal range of motion.   Skin:     Coloration: Skin is not jaundiced.   Neurological:      General: No focal deficit present.      Mental Status: She is alert and oriented to person, place, and time.   Psychiatric:         Mood and Affect: Mood normal.         Behavior: Behavior normal.          Significant Labs:    BMP:  Recent Labs   Lab 12/09/23  0246 12/10/23  0355 12/11/23  0828    140 139   K 3.6 3.5 3.6   * 115* 113*   CO2 15* 16* 18*   BUN 31* 22* 18   CREATININE 1.9* 1.6* 1.3   CALCIUM 9.4 9.4 9.2       CBC:  Recent Labs   Lab 12/09/23  0246 12/10/23  0355 12/11/23  0828   WBC 10.81 6.88 5.75   HGB 11.9* 11.4* 10.9*   HCT 37.3 36.6* 34.7*    312 305       All pertinent labs results from the past 24 hours have been reviewed.    Significant Imaging:  All pertinent imaging results/findings from the past 24 hours have been reviewed.                    Assessment and Plan:     Hydronephrosis  Edita Riley is a 58 y.o. female with hx of as above who presents with n/v and weakness, found to have a UTI along with b/l hydronephrosis.    - Strict I's/O's  - Trend Cr, avoid nephrotoxic agents, and dose medications to patient's current GFR  - F/u Ucx, tailor abx as needed  - ID consulted, f/u recs for outpt abx  - CT with stable right hydro (2/2 reflux)  - L PCNT  found to be clogged with immediate return of 200cc of c/y/u with mucous following flushing and change of agnieszka bag  - No indications for urologic interventions at this time  - Nursing to flush nephrostomy tube PRN  - Recommend continued PCNT exchanges with IR q6-8weeks  - We will coordinate schedules with CRS for joint anastomotic revision surgery in the near future  - Recommend consult to CRS while inpt to reestablish care and for possible future surgical planning  - Recommend PT/OT, ambulation while in hospital  - Recommend dietary consult and boost shakes TID to optimize albumin prior to surgery  - Recommend consideration for bicarb supplementation given acidosis  - Recommend consideration of Iron supplementation given persistent anemia  - All remaining care per primary team        VTE Risk Mitigation (From admission, onward)           Ordered     enoxaparin injection 40 mg  Daily         12/08/23 0225     IP VTE HIGH RISK PATIENT  Once         12/08/23 0225     Place sequential compression device  Until discontinued         12/08/23 0225                    Thank you for your consult.    Rufus Dunn MD  Urology  [facility]

## 2023-12-11 NOTE — PROGRESS NOTES
[facility] [address] [place] [address] Medicine  Progress Note    Patient Name: Edita Riley  MRN: [id number]  Patient Class: IP- Inpatient   Admission Date: 12/7/2023  Length of Stay: 3 days  Attending Physician: Jamar Campbell MD  Primary Care Provider: Trina Vu MD        Subjective:     Principal Problem:Pyelonephritis        HPI:  59 y/o AAF hx of Cervical Cancer s/p XRT & Bilateral Ureteral strictures with obstructive uropathy now s/p transverse colon conduit, s/p Left Nephrostomy tube, s/p Ileostomy, hx of MDR UTI/pyelonephritis presents to the ED with complaints of abdominal pain.  She reports since Monday 12/4, she has had recurrent nausea vomiting over 3 days, poor diet/oral intake with associated weakness.     2 recent hospitalizations with left sided pyelonephritis, prior urine cultures show different MDR bacteria that have grown.  She is pending evaluation by colo-rectal surgery for outpatient revision of urostomy, follows with Dr. Balbuena (urology)     Reports having epigastric pain 10/10 on arrival,  9/10 during interview, only mild relief with initial morphine dosage.   She does not note any change in the ileostomy stool output, no reported change in amount or quality from her urostomy or her left nephrostomy tube. She denies any fevers of chills.     ED Treatment: Morphine 4mg IV,  Zofran 4mg IV, NS 1Liter    Overview/Hospital Course:  No notes on file    Interval History:   Urine cx growing GNR, ID following. Continue empiric cefepime. CT A/P w/o contrast with stable b/l hydranephrosis. Cr improved to 1.3. ODESSA resolved. Consulted urology for b/l hydronephrosis and L PCNT  exchange. Rec consulting CRS to coordinate for joint anastomotic revision surgery in the near future. Increase bicarb to 1300mg TID. Consulting PT/OT       Objective:     Vital Signs (Most Recent):  Temp: 97.9 °F (36.6 °C) (12/11/23 1117)  Pulse: 69 (12/11/23 1117)  Resp: 18 (12/11/23 1117)  BP: (!) 114/56  (12/11/23 1117)  SpO2: 97 % (12/11/23 1117) Vital Signs (24h Range):  Temp:  [97.8 °F (36.6 °C)-98.6 °F (37 °C)] 97.9 °F (36.6 °C)  Pulse:  [66-82] 69  Resp:  [16-18] 18  SpO2:  [96 %-100 %] 97 %  BP: ()/(51-58) 114/56     Weight: 81 kg (178 lb 9.2 oz)  Body mass index is 27.97 kg/m².    Intake/Output Summary (Last 24 hours) at 12/11/2023 1120  Last data filed at 12/11/2023 0346  Gross per 24 hour   Intake 220 ml   Output 1850 ml   Net -1630 ml           Physical Exam  Vitals and nursing note reviewed.   Constitutional:       Appearance: She is ill-appearing.   Eyes:      General: No scleral icterus.     Pupils: Pupils are equal, round, and reactive to light.   Cardiovascular:      Rate and Rhythm: Normal rate and regular rhythm.      Pulses: Normal pulses.   Pulmonary:      Effort: Pulmonary effort is normal. No respiratory distress.      Breath sounds: No wheezing.   Abdominal:      Tenderness: There is abdominal tenderness (epigastric).      Comments: RLQ Ileostomy with brown liquid stool output, surrounding abdominal wall near ileostomy with erythema/hyperpigmented appearance.     LUQ urostomy  Left Flank nephrostomy tube   Skin:     General: Skin is warm and dry.   Neurological:      General: No focal deficit present.      Mental Status: She is alert.           Assessment/Plan:      * Pyelonephritis  Patient with hx of MDRO and VRE and recent bouts pyelonephritis, with similar symptoms, epigastric tenderness, leukocytosis, pyuria  ID Consulted. Continue cefepime.  Urine cx growing GNR, final results pending   Blood cx with NGTD  CT A/P w/o contrast with stable hydronephrosis BL but no mention perinephric fat stranding   Per ID, will need L PCNT exchanged once cultures known and abx tailored   Consulted urology. Per urology, L PCNT found to be clogged with immediate return of 200cc of c/y/u with mucous following flushing and change of agnieszka bag  No indications for urologic interventions at this  time  Nursing to flush nephrostomy tube PRN  Recommend continued PCNT exchanges with IR q6-8weeks  Urology plans to coordinate schedules with CRS for joint anastomotic revision surgery in the near future  Recommend consult to CRS while inpt to reestablish care and for possible future surgical planning  Recommend PT/OT, ambulation while in hospital  Recommend dietary consult and boost shakes TID to optimize albumin prior to surgery      Metabolic acidosis, NAG, bicarbonate losses  Patient with ileostomy, patient reporting increased output recently  Increase oral sodium bicarbonate to 1300mg TID from 650 mg TID        Weakness  Debility likely related to acute cystitis vs pyelonephritis   PT/OT consulted        Azotemia  Mild creatinine elevation but rising BUN, suspect a pre-renal etiology related to nausea/vomiting and decreased oral intake  -Trend Renal function panel      Essential hypertension  patient is not on any chronic oral therapy at this time    CKD (chronic kidney disease), stage III  ODESSA - resolved   BMP reviewed- noted Estimated Creatinine Clearance: 50.4 mL/min (based on SCr of 1.3 mg/dL). according to latest data. Based on current GFR, CKD stage is stage 3 - GFR 30-59.  Monitor UOP and serial BMP and adjust therapy as needed. Renally dose meds. Avoid nephrotoxic medications and procedures.    Cr 1.9, baseline 1.4  Likely iso of pyelonephritis vs dehydration   FeNA 0.2%, Received gentle IVF hydration  Abx as above  ODESSA resolved.     Nephrostomy status  Left Nephrostomy draining urine- if any reduced UOp or ODESSA developing - obtain renal imaging to r/o nephrostomy tube dysfunction      Presence of urostomy  Consult wound care re: urostomy care supplies while patient is admitted      History of DVT (deep vein thrombosis)  -not on chronic anticoagulation  -continue sq enoxaparin DVT ppx      Ileostomy in place  Consult wound care re: ileostomy care supplies while patient is  admitted      Hydronephrosis          VTE Risk Mitigation (From admission, onward)           Ordered     enoxaparin injection 40 mg  Daily         12/08/23 0225     IP VTE HIGH RISK PATIENT  Once         12/08/23 0225     Place sequential compression device  Until discontinued         12/08/23 0225                    Discharge Planning   OK: 12/14/2023     Code Status: Full Code   Is the patient medically ready for discharge?:     Reason for patient still in hospital (select all that apply): Patient trending condition  Discharge Plan A: Home, Home with family                  Jamar Campbell MD  Department of Hospital Medicine   [facility]

## 2023-12-11 NOTE — SUBJECTIVE & OBJECTIVE
Interval History:   Urine cx growing GNR, ID following. Continue empiric cefepime. CT A/P w/o contrast with stable b/l hydranephrosis. Cr improved to 1.3. ODESSA resolved. Consulted urology for b/l hydronephrosis and L PCNT  exchange. Rec consulting CRS to coordinate for joint anastomotic revision surgery in the near future. Increase bicarb to 1300mg TID. Consulting PT/OT       Objective:     Vital Signs (Most Recent):  Temp: 97.9 °F (36.6 °C) (12/11/23 1117)  Pulse: 69 (12/11/23 1117)  Resp: 18 (12/11/23 1117)  BP: (!) 114/56 (12/11/23 1117)  SpO2: 97 % (12/11/23 1117) Vital Signs (24h Range):  Temp:  [97.8 °F (36.6 °C)-98.6 °F (37 °C)] 97.9 °F (36.6 °C)  Pulse:  [66-82] 69  Resp:  [16-18] 18  SpO2:  [96 %-100 %] 97 %  BP: ()/(51-58) 114/56     Weight: 81 kg (178 lb 9.2 oz)  Body mass index is 27.97 kg/m².    Intake/Output Summary (Last 24 hours) at 12/11/2023 1120  Last data filed at 12/11/2023 0346  Gross per 24 hour   Intake 220 ml   Output 1850 ml   Net -1630 ml           Physical Exam  Vitals and nursing note reviewed.   Constitutional:       Appearance: She is ill-appearing.   Eyes:      General: No scleral icterus.     Pupils: Pupils are equal, round, and reactive to light.   Cardiovascular:      Rate and Rhythm: Normal rate and regular rhythm.      Pulses: Normal pulses.   Pulmonary:      Effort: Pulmonary effort is normal. No respiratory distress.      Breath sounds: No wheezing.   Abdominal:      Tenderness: There is abdominal tenderness (epigastric).      Comments: RLQ Ileostomy with brown liquid stool output, surrounding abdominal wall near ileostomy with erythema/hyperpigmented appearance.     LUQ urostomy  Left Flank nephrostomy tube   Skin:     General: Skin is warm and dry.   Neurological:      General: No focal deficit present.      Mental Status: She is alert.

## 2023-12-11 NOTE — PLAN OF CARE
Problem: Urine Elimination Impaired (Urinary Diversion)  Goal: Effective Urinary Elimination  Outcome: Ongoing, Progressing     Problem: Respiratory Compromise (Urinary Diversion)  Goal: Effective Oxygenation and Ventilation  Outcome: Ongoing, Progressing     Problem: Postoperative Stoma Care (Urinary Diversion)  Goal: Optimal Stoma Healing  Outcome: Ongoing, Progressing     Problem: Postoperative Nausea and Vomiting (Urinary Diversion)  Goal: Nausea and Vomiting Relief  Outcome: Ongoing, Progressing     Problem: Pain (Urinary Diversion)  Goal: Acceptable Pain Control  Outcome: Ongoing, Progressing     Problem: Ongoing Anesthesia Effects (Urinary Diversion)  Goal: Anesthesia/Sedation Recovery  Outcome: Ongoing, Progressing     Problem: Infection (Urinary Diversion)  Goal: Absence of Infection Signs and Symptoms  Outcome: Ongoing, Progressing     Problem: Fluid and Electrolyte Imbalance (Urinary Diversion)  Goal: Fluid and Electrolyte Balance  Outcome: Ongoing, Progressing     Problem: Bowel Motility Impaired (Urinary Diversion)  Goal: Effective Bowel Elimination  Outcome: Ongoing, Progressing     Problem: Bleeding (Urinary Diversion)  Goal: Absence of Bleeding  Outcome: Ongoing, Progressing     Problem: Adjustment to Surgery (Urinary Diversion)  Goal: Psychosocial Adjustment Initiation  Outcome: Ongoing, Progressing     Problem: VTE (Venous Thromboembolism)  Goal: VTE (Venous Thromboembolism) Symptom Resolution  Outcome: Ongoing, Progressing     Problem: Self-Care Deficit  Goal: Improved Ability to Complete Activities of Daily Living  Outcome: Ongoing, Progressing     Problem: Respiratory Compromise (Ileostomy)  Goal: Effective Oxygenation and Ventilation  Outcome: Ongoing, Progressing     Problem: Postoperative Urinary Retention (Ileostomy)  Goal: Effective Urinary Elimination  Outcome: Ongoing, Progressing     Problem: Postoperative Stoma Care (Ileostomy)  Goal: Optimal Stoma Healing  Outcome: Ongoing,  Progressing     Problem: Postoperative Nausea and Vomiting (Ileostomy)  Goal: Nausea and Vomiting Relief  Outcome: Ongoing, Progressing     Problem: Pain (Ileostomy)  Goal: Acceptable Pain Control  Outcome: Ongoing, Progressing     Problem: Ongoing Anesthesia Effects (Ileostomy)  Goal: Anesthesia/Sedation Recovery  Outcome: Ongoing, Progressing

## 2023-12-12 LAB
ALLENS TEST: ABNORMAL
ANION GAP SERPL CALC-SCNC: 9 MMOL/L (ref 8–16)
BACTERIA BLD CULT: NORMAL
BACTERIA BLD CULT: NORMAL
BACTERIA UR CULT: ABNORMAL
BASOPHILS # BLD AUTO: 0.02 K/UL (ref 0–0.2)
BASOPHILS NFR BLD: 0.3 % (ref 0–1.9)
BUN SERPL-MCNC: 18 MG/DL (ref 6–20)
CALCIUM SERPL-MCNC: 9.3 MG/DL (ref 8.7–10.5)
CHLORIDE SERPL-SCNC: 112 MMOL/L (ref 95–110)
CHLORIDE UR-SCNC: <20 MMOL/L (ref 25–200)
CO2 SERPL-SCNC: 18 MMOL/L (ref 23–29)
CREAT SERPL-MCNC: 1.4 MG/DL (ref 0.5–1.4)
DIFFERENTIAL METHOD: ABNORMAL
EOSINOPHIL # BLD AUTO: 0.2 K/UL (ref 0–0.5)
EOSINOPHIL NFR BLD: 3.6 % (ref 0–8)
ERYTHROCYTE [DISTWIDTH] IN BLOOD BY AUTOMATED COUNT: 14.9 % (ref 11.5–14.5)
EST. GFR  (NO RACE VARIABLE): 43.1 ML/MIN/1.73 M^2
GLUCOSE SERPL-MCNC: 99 MG/DL (ref 70–110)
HCO3 UR-SCNC: 22.5 MMOL/L (ref 24–28)
HCT VFR BLD AUTO: 35.8 % (ref 37–48.5)
HCT VFR BLD CALC: 43 %PCV (ref 36–54)
HGB BLD-MCNC: 11.4 G/DL (ref 12–16)
IMM GRANULOCYTES # BLD AUTO: 0.02 K/UL (ref 0–0.04)
IMM GRANULOCYTES NFR BLD AUTO: 0.3 % (ref 0–0.5)
LYMPHOCYTES # BLD AUTO: 1.7 K/UL (ref 1–4.8)
LYMPHOCYTES NFR BLD: 24.8 % (ref 18–48)
MCH RBC QN AUTO: 27.6 PG (ref 27–31)
MCHC RBC AUTO-ENTMCNC: 31.8 G/DL (ref 32–36)
MCV RBC AUTO: 87 FL (ref 82–98)
MONOCYTES # BLD AUTO: 0.9 K/UL (ref 0.3–1)
MONOCYTES NFR BLD: 13.4 % (ref 4–15)
NEUTROPHILS # BLD AUTO: 3.8 K/UL (ref 1.8–7.7)
NEUTROPHILS NFR BLD: 57.6 % (ref 38–73)
NRBC BLD-RTO: 0 /100 WBC
PCO2 BLDA: 58.5 MMHG (ref 35–45)
PH SMN: 7.19 [PH] (ref 7.35–7.45)
PLATELET # BLD AUTO: 303 K/UL (ref 150–450)
PMV BLD AUTO: 10.1 FL (ref 9.2–12.9)
PO2 BLDA: 16 MMHG (ref 40–60)
POC BE: -6 MMOL/L
POC SATURATED O2: 14 % (ref 95–100)
POC TCO2: 24 MMOL/L (ref 24–29)
POTASSIUM SERPL-SCNC: 3.6 MMOL/L (ref 3.5–5.1)
POTASSIUM UR-SCNC: 32 MMOL/L (ref 15–95)
RBC # BLD AUTO: 4.13 M/UL (ref 4–5.4)
SAMPLE: ABNORMAL
SITE: ABNORMAL
SODIUM SERPL-SCNC: 139 MMOL/L (ref 136–145)
SODIUM UR-SCNC: <10 MMOL/L (ref 20–250)
WBC # BLD AUTO: 6.64 K/UL (ref 3.9–12.7)

## 2023-12-12 PROCEDURE — 99233 SBSQ HOSP IP/OBS HIGH 50: CPT | Mod: ,,, | Performed by: PHYSICIAN ASSISTANT

## 2023-12-12 PROCEDURE — 85025 COMPLETE CBC W/AUTO DIFF WBC: CPT | Performed by: STUDENT IN AN ORGANIZED HEALTH CARE EDUCATION/TRAINING PROGRAM

## 2023-12-12 PROCEDURE — 80048 BASIC METABOLIC PNL TOTAL CA: CPT | Performed by: STUDENT IN AN ORGANIZED HEALTH CARE EDUCATION/TRAINING PROGRAM

## 2023-12-12 PROCEDURE — 82436 ASSAY OF URINE CHLORIDE: CPT

## 2023-12-12 PROCEDURE — 25000003 PHARM REV CODE 250: Performed by: STUDENT IN AN ORGANIZED HEALTH CARE EDUCATION/TRAINING PROGRAM

## 2023-12-12 PROCEDURE — 21400001 HC TELEMETRY ROOM

## 2023-12-12 PROCEDURE — 97530 THERAPEUTIC ACTIVITIES: CPT

## 2023-12-12 PROCEDURE — 97161 PT EVAL LOW COMPLEX 20 MIN: CPT

## 2023-12-12 PROCEDURE — 97165 OT EVAL LOW COMPLEX 30 MIN: CPT

## 2023-12-12 PROCEDURE — 84300 ASSAY OF URINE SODIUM: CPT

## 2023-12-12 PROCEDURE — 99232 PR SUBSEQUENT HOSPITAL CARE,LEVL II: ICD-10-PCS | Mod: ,,, | Performed by: COLON & RECTAL SURGERY

## 2023-12-12 PROCEDURE — 84133 ASSAY OF URINE POTASSIUM: CPT

## 2023-12-12 PROCEDURE — 99232 SBSQ HOSP IP/OBS MODERATE 35: CPT | Mod: ,,, | Performed by: COLON & RECTAL SURGERY

## 2023-12-12 PROCEDURE — 99233 PR SUBSEQUENT HOSPITAL CARE,LEVL III: ICD-10-PCS | Mod: ,,, | Performed by: PHYSICIAN ASSISTANT

## 2023-12-12 PROCEDURE — 36415 COLL VENOUS BLD VENIPUNCTURE: CPT | Performed by: STUDENT IN AN ORGANIZED HEALTH CARE EDUCATION/TRAINING PROGRAM

## 2023-12-12 PROCEDURE — 25000003 PHARM REV CODE 250: Performed by: HOSPITALIST

## 2023-12-12 PROCEDURE — 99900035 HC TECH TIME PER 15 MIN (STAT)

## 2023-12-12 PROCEDURE — 99223 1ST HOSP IP/OBS HIGH 75: CPT | Mod: ,,, | Performed by: HOSPITALIST

## 2023-12-12 PROCEDURE — 63600175 PHARM REV CODE 636 W HCPCS: Performed by: HOSPITALIST

## 2023-12-12 PROCEDURE — 99223 PR INITIAL HOSPITAL CARE,LEVL III: ICD-10-PCS | Mod: ,,, | Performed by: HOSPITALIST

## 2023-12-12 PROCEDURE — 82803 BLOOD GASES ANY COMBINATION: CPT

## 2023-12-12 RX ADMIN — SODIUM BICARBONATE 650 MG TABLET 1300 MG: at 02:12

## 2023-12-12 RX ADMIN — CEFEPIME 1 G: 1 INJECTION, POWDER, FOR SOLUTION INTRAMUSCULAR; INTRAVENOUS at 12:12

## 2023-12-12 RX ADMIN — SODIUM BICARBONATE 650 MG TABLET 1300 MG: at 10:12

## 2023-12-12 RX ADMIN — ENOXAPARIN SODIUM 40 MG: 40 INJECTION SUBCUTANEOUS at 06:12

## 2023-12-12 RX ADMIN — MIRTAZAPINE 30 MG: 15 TABLET, FILM COATED ORAL at 10:12

## 2023-12-12 RX ADMIN — SODIUM BICARBONATE 650 MG TABLET 1300 MG: at 08:12

## 2023-12-12 RX ADMIN — MICONAZOLE NITRATE: 20 POWDER TOPICAL at 10:12

## 2023-12-12 RX ADMIN — HYDROMORPHONE HYDROCHLORIDE 1 MG: 1 INJECTION, SOLUTION INTRAMUSCULAR; INTRAVENOUS; SUBCUTANEOUS at 08:12

## 2023-12-12 RX ADMIN — SODIUM BICARBONATE: 84 INJECTION, SOLUTION INTRAVENOUS at 06:12

## 2023-12-12 RX ADMIN — MICONAZOLE NITRATE: 20 POWDER TOPICAL at 08:12

## 2023-12-12 NOTE — SUBJECTIVE & OBJECTIVE
Interval History:   Urine cx growing GNR, ID following. Continue empiric cefepime. CRS signed off. No plans for IP procedure. Rec Op follow up. Urology following. Left nephrostomy tube was found to be clogged, and was irrigated yesterday by Urology, is now draining urine freely. PT/OT eval pending      Objective:     Vital Signs (Most Recent):  Temp: 97.6 °F (36.4 °C) (12/12/23 0756)  Pulse: 68 (12/12/23 0756)  Resp: 18 (12/12/23 0830)  BP: (!) 100/57 (12/12/23 0756)  SpO2: 99 % (12/12/23 0756) Vital Signs (24h Range):  Temp:  [97.2 °F (36.2 °C)-98.7 °F (37.1 °C)] 97.6 °F (36.4 °C)  Pulse:  [68-81] 68  Resp:  [16-20] 18  SpO2:  [96 %-99 %] 99 %  BP: ()/(53-61) 100/57     Weight: 81 kg (178 lb 9.2 oz)  Body mass index is 27.97 kg/m².    Intake/Output Summary (Last 24 hours) at 12/12/2023 1056  Last data filed at 12/12/2023 0835  Gross per 24 hour   Intake --   Output 2475 ml   Net -2475 ml           Physical Exam  Vitals and nursing note reviewed.   Constitutional:       Appearance: She is ill-appearing.   Eyes:      General: No scleral icterus.     Pupils: Pupils are equal, round, and reactive to light.   Cardiovascular:      Rate and Rhythm: Normal rate and regular rhythm.      Pulses: Normal pulses.   Pulmonary:      Effort: Pulmonary effort is normal. No respiratory distress.      Breath sounds: No wheezing.   Abdominal:      Tenderness: There is abdominal tenderness (epigastric).      Comments: RLQ Ileostomy with brown liquid stool output, surrounding abdominal wall near ileostomy with erythema/hyperpigmented appearance.     LUQ urostomy  Left Flank nephrostomy tube   Skin:     General: Skin is warm and dry.   Neurological:      General: No focal deficit present.      Mental Status: She is alert.

## 2023-12-12 NOTE — ASSESSMENT & PLAN NOTE
Edita Riley is a 58 y.o. female with hx of as above who presents with n/v and weakness, found to have a UTI along with b/l hydronephrosis.    - Strict I's/O's  - Trend Cr, avoid nephrotoxic agents, and dose medications to patient's current GFR  - F/u Ucx, tailor abx as needed  - ID consulted, f/u recs for outpt abx  - CT with stable right hydro (2/2 reflux)  - L PCNT found to be clogged yesterday with immediate return of 200cc of c/y/u with mucous following flushing and change of agnieszka bag  - No indications for urologic interventions at this time  - Nursing to flush nephrostomy tube PRN  - Recommend continued PCNT exchanges with IR q6-8weeks  - We will coordinate schedules with CRS for joint anastomotic revision surgery in the near future  - Recommend PT/OT, ambulation while in hospital  - Recommend dietary consult and boost shakes TID to optimize albumin prior to surgery  - Recommend mIVF's   - All remaining care per primary team

## 2023-12-12 NOTE — ASSESSMENT & PLAN NOTE
Patient with ileostomy, patient reporting increased output recently  Increase oral sodium bicarbonate to 1300mg TID from 650 mg TID    Consulted nephrology

## 2023-12-12 NOTE — PROGRESS NOTES
[facility]  Urology  Progress Note    Patient Name: Edita Riley  MRN: [id number]  Admission Date: 12/7/2023  Hospital Length of Stay: 4 days  Code Status: Full Code   Attending Provider: Jamar Campbell MD   Primary Care Physician: Trina Vu MD    Subjective:     HPI:  Edita Riley is a 58 y.o. female with history of bilateral ureteral stricture secondary to XRT for cervical cancer, s/p open transverse colon conduit urinary diversion on 9/11/2020.  Urology consulted for b/l hydronephrosis with UTI.     She has a complicated urologic history and is s/p R PCNL on 4/21. She passed a clamping trial and was able to have the right nephrostomy tube removed.  The left is still in place, she had an antegrade nephrostogram and a loopogram 6/6/2023 which showed reflux on the right and left distal ureteral stricture beginning at the level of the inferior portion of L4 with no contrast opacifying the ureter past this point. The nephrostomy tube was changed on 10/2023 while admitted. She has had multiple episodes of pyelonephritis since August 2023. She was tentatively scheduled for anastomosis revision with Dr. Balbuena in November, though this was delayed due to coordination with CRS.     On presentation she reported weakness and malaise along with n/v. She denies subjective fevers, chills, or flank pain.  No issues with drainage of the L nephrostomy tube and reports clear yellow urine. Normal ostomy output for stool and urine, respectively.     Upon assessment, she is resting comfortably without pain.     Labs 12/11/23: WBC 5.7 Hgb 10.9 Cr 1.3 (baseline around 1.1-1.2).      Ucx with GNR's (previously e.coli), Bcx with NGTD, CT 12/9/23 with stable moderate right hydro and left neph tube in good position along with hydronephrosis. Pouch does not appear to be distended on imaging.     Interval History: AFVSS. AM labs pending. Pain controlled. Ucx from 12/7 growing GNRs. On  cefepime.     LNT: 125/175  Urostomy: 450/150      Objective:     Temp:  [97.2 °F (36.2 °C)-98.7 °F (37.1 °C)] 97.2 °F (36.2 °C)  Pulse:  [69-81] 70  Resp:  [16-20] 16  SpO2:  [96 %-97 %] 97 %  BP: ()/(51-61) 99/53     Body mass index is 27.97 kg/m².           Drains       Drain  Duration                  Urostomy 09/11/20 0000 ileal conduit LLQ 1187 days         Ileostomy 09/18/20 RLQ 1180 days         Nephrostomy 10/26/23 0933 Left 12 Fr. 46 days                     Physical Exam  Vitals and nursing note reviewed.   Constitutional:       General: She is not in acute distress.  HENT:      Head: Atraumatic.      Nose: Nose normal.   Eyes:      Extraocular Movements: Extraocular movements intact.   Cardiovascular:      Rate and Rhythm: Normal rate.   Pulmonary:      Effort: Pulmonary effort is normal.   Abdominal:      General: Abdomen is flat. There is no distension.      Tenderness: There is no abdominal tenderness. There is no right CVA tenderness or left CVA tenderness.      Comments: RLQ ostomy with liquid stool, pink and patent ostomy  LLQ ostomy with clear yellow urine, pink and patent ostomy  Left neph tube insertion site without erythema, nontender to palpation      Musculoskeletal:         General: Normal range of motion.      Cervical back: Normal range of motion.   Skin:     Coloration: Skin is not jaundiced.   Neurological:      General: No focal deficit present.      Mental Status: She is alert and oriented to person, place, and time.   Psychiatric:         Mood and Affect: Mood normal.         Behavior: Behavior normal.           Significant Labs:    BMP:  Recent Labs   Lab 12/09/23  0246 12/10/23  0355 12/11/23  0828    140 139   K 3.6 3.5 3.6   * 115* 113*   CO2 15* 16* 18*   BUN 31* 22* 18   CREATININE 1.9* 1.6* 1.3   CALCIUM 9.4 9.4 9.2       CBC:   Recent Labs   Lab 12/09/23  0246 12/10/23  0355 12/11/23  0828   WBC 10.81 6.88 5.75   HGB 11.9* 11.4* 10.9*   HCT 37.3 36.6* 34.7*     312 305       All pertinent labs results from the past 24 hours have been reviewed.    Significant Imaging:  All pertinent imaging results/findings from the past 24 hours have been reviewed.                  Assessment/Plan:     Hydronephrosis  Edita Riley is a 58 y.o. female with hx of as above who presents with n/v and weakness, found to have a UTI along with b/l hydronephrosis.    - Strict I's/O's  - Trend Cr, avoid nephrotoxic agents, and dose medications to patient's current GFR  - F/u Ucx, tailor abx as needed  - ID consulted, f/u recs for outpt abx  - CT with stable right hydro (2/2 reflux)  - L PCNT found to be clogged yesterday with immediate return of 200cc of c/y/u with mucous following flushing and change of agnieszka bag  - No indications for urologic interventions at this time  - Nursing to flush nephrostomy tube PRN  - Recommend continued PCNT exchanges with IR q6-8weeks  - We will coordinate schedules with CRS for joint anastomotic revision surgery in the near future  - Recommend PT/OT, ambulation while in hospital  - Recommend dietary consult and boost shakes TID to optimize albumin prior to surgery  - Recommend mIVF's   - All remaining care per primary team        VTE Risk Mitigation (From admission, onward)           Ordered     enoxaparin injection 40 mg  Daily         12/08/23 0225     IP VTE HIGH RISK PATIENT  Once         12/08/23 0225     Place sequential compression device  Until discontinued         12/08/23 0225                    Wale Sawyer DO  Urology  [facility]

## 2023-12-12 NOTE — PROGRESS NOTES
[facility] [address] [place] [address] Medicine  Progress Note    Patient Name: Edita Riley  MRN: [id number]  Patient Class: IP- Inpatient   Admission Date: 12/7/2023  Length of Stay: 4 days  Attending Physician: Jamar Campbell MD  Primary Care Provider: Trina Vu MD        Subjective:     Principal Problem:Pyelonephritis        HPI:  57 y/o AAF hx of Cervical Cancer s/p XRT & Bilateral Ureteral strictures with obstructive uropathy now s/p transverse colon conduit, s/p Left Nephrostomy tube, s/p Ileostomy, hx of MDR UTI/pyelonephritis presents to the ED with complaints of abdominal pain.  She reports since Monday 12/4, she has had recurrent nausea vomiting over 3 days, poor diet/oral intake with associated weakness.     2 recent hospitalizations with left sided pyelonephritis, prior urine cultures show different MDR bacteria that have grown.  She is pending evaluation by colo-rectal surgery for outpatient revision of urostomy, follows with Dr. Balbuena (urology)     Reports having epigastric pain 10/10 on arrival,  9/10 during interview, only mild relief with initial morphine dosage.   She does not note any change in the ileostomy stool output, no reported change in amount or quality from her urostomy or her left nephrostomy tube. She denies any fevers of chills.     ED Treatment: Morphine 4mg IV,  Zofran 4mg IV, NS 1Liter    Overview/Hospital Course:  No notes on file    Interval History:   Urine cx growing GNR, ID following. Continue empiric cefepime. CRS signed off. No plans for IP procedure. Rec Op follow up. Urology following. Left nephrostomy tube was found to be clogged, and was irrigated yesterday by Urology, is now draining urine freely. PT/OT eval pending      Objective:     Vital Signs (Most Recent):  Temp: 97.6 °F (36.4 °C) (12/12/23 0756)  Pulse: 68 (12/12/23 0756)  Resp: 18 (12/12/23 0830)  BP: (!) 100/57 (12/12/23 0756)  SpO2: 99 % (12/12/23 0756) Vital Signs (24h Range):  Temp:   [97.2 °F (36.2 °C)-98.7 °F (37.1 °C)] 97.6 °F (36.4 °C)  Pulse:  [68-81] 68  Resp:  [16-20] 18  SpO2:  [96 %-99 %] 99 %  BP: ()/(53-61) 100/57     Weight: 81 kg (178 lb 9.2 oz)  Body mass index is 27.97 kg/m².    Intake/Output Summary (Last 24 hours) at 12/12/2023 1056  Last data filed at 12/12/2023 0835  Gross per 24 hour   Intake --   Output 2475 ml   Net -2475 ml           Physical Exam  Vitals and nursing note reviewed.   Constitutional:       Appearance: She is ill-appearing.   Eyes:      General: No scleral icterus.     Pupils: Pupils are equal, round, and reactive to light.   Cardiovascular:      Rate and Rhythm: Normal rate and regular rhythm.      Pulses: Normal pulses.   Pulmonary:      Effort: Pulmonary effort is normal. No respiratory distress.      Breath sounds: No wheezing.   Abdominal:      Tenderness: There is abdominal tenderness (epigastric).      Comments: RLQ Ileostomy with brown liquid stool output, surrounding abdominal wall near ileostomy with erythema/hyperpigmented appearance.     LUQ urostomy  Left Flank nephrostomy tube   Skin:     General: Skin is warm and dry.   Neurological:      General: No focal deficit present.      Mental Status: She is alert.         Assessment/Plan:      * Pyelonephritis  Patient with hx of MDRO and VRE and recent bouts pyelonephritis, with similar symptoms, epigastric tenderness, leukocytosis, pyuria  ID Consulted. Continue cefepime.  Urine cx growing GNR, final results pending   Blood cx with NGTD  CT A/P w/o contrast with stable hydronephrosis BL but no mention perinephric fat stranding   Per ID, will need L PCNT exchanged once cultures known and abx tailored   Consulted urology. L PCNT found to be clogged with mucous following flushing and change of agnieszka bag. Urine draining well now  No indications for urologic interventions at this time  Nursing to flush nephrostomy tube PRN  Recommend continued PCNT exchanges with IR q6-8weeks  Urology plans to  coordinate schedules with CRS for joint anastomotic revision surgery in the near future  Consulted CRS. Rec OP follow up for surgical planning. Rec ostomy care consult  Recommend PT/OT, ambulation while in hospital  Recommend dietary consult and boost shakes TID to optimize albumin prior to surgery      Metabolic acidosis, NAG, bicarbonate losses  Patient with ileostomy, patient reporting increased output recently  Increase oral sodium bicarbonate to 1300mg TID from 650 mg TID    Consulted nephrology       Weakness  Debility likely related to acute cystitis vs pyelonephritis   PT/OT consulted        Azotemia  Mild creatinine elevation but rising BUN, suspect a pre-renal etiology related to nausea/vomiting and decreased oral intake  -Trend Renal function panel      Essential hypertension  patient is not on any chronic oral therapy at this time    CKD (chronic kidney disease), stage III  ODESSA - resolved   BMP reviewed- noted Estimated Creatinine Clearance: 46.8 mL/min (based on SCr of 1.4 mg/dL). according to latest data. Based on current GFR, CKD stage is stage 3 - GFR 30-59.  Monitor UOP and serial BMP and adjust therapy as needed. Renally dose meds. Avoid nephrotoxic medications and procedures.    Cr 1.9, baseline 1.4  Likely iso of pyelonephritis vs dehydration   FeNA 0.2%, Received gentle IVF hydration  Abx as above  ODESSA resolved.     Nephrostomy status  Left Nephrostomy draining urine- if any reduced UOp or ODESSA developing - obtain renal imaging to r/o nephrostomy tube dysfunction      Presence of urostomy  Consult wound care re: urostomy care supplies while patient is admitted      History of DVT (deep vein thrombosis)  -not on chronic anticoagulation  -continue sq enoxaparin DVT ppx      Ileostomy in place  Consult wound care re: ileostomy care supplies while patient is admitted      Hydronephrosis          VTE Risk Mitigation (From admission, onward)           Ordered     enoxaparin injection 40 mg  Daily          12/08/23 0225     IP VTE HIGH RISK PATIENT  Once         12/08/23 0225     Place sequential compression device  Until discontinued         12/08/23 0225                    Discharge Planning   OK: 12/14/2023     Code Status: Full Code   Is the patient medically ready for discharge?:     Reason for patient still in hospital (select all that apply): Patient trending condition  Discharge Plan A: Home, Home with family                  Jamar Campbell MD  Department of Hospital Medicine   [facility]

## 2023-12-12 NOTE — SUBJECTIVE & OBJECTIVE
Past Medical History:   Diagnosis Date    Abnormal mammogram 08/25/2020    Abnormal mammogram 08/25/2020    Acute blood loss anemia     Acute deep vein thrombosis (DVT) of lower extremity 12/09/2020    Advance care planning 04/30/2021    ODESSA (acute kidney injury) 03/21/2020    Anemia due to chronic blood loss     Anemia due to chronic kidney disease     Anemia due to chronic kidney disease 05/18/2020    Anxiety     Bilateral ureteral obstruction 09/11/2020    Bilious vomiting with nausea 06/11/2023    Cardiovascular event risk -- low 09/14/2015    ASCVD 10-year risk 1.9% (with optimal risk factors 1.3%) as of 9/14/2015     Cervical cancer 2014    Chronic back pain     Colostomy care     Deep vein thrombosis     Depression     Diarrhea due to malabsorption 11/14/2018    Difficult intubation     Discharge planning issues 04/30/2021    Disorder of kidney and ureter     DVT of lower extremity, bilateral 11/04/2020    Edema 04/10/2023    Emphysema of lung 04/10/2023    Fibromyalgia     Fungemia 09/27/2020    Generalized abdominal pain 08/25/2020    GERD (gastroesophageal reflux disease)     Hemifacial spasm 09/16/2015    Hiatal hernia 2014    History of cervical cancer 10/11/2018    History of essential hypertension 05/04/2015    Not requiring medications at this time  BP is low- concern that this may correlate with increased stool output from stoma. Encourage her to contact GI and her PCP.    Hx of psychiatric care     Cymbalta, trazodone    Hypertension     Hypomagnesemia 11/21/2018    Hyponatremia 09/10/2023    Impaired functional mobility, balance, gait, and endurance 07/24/2015    Lactose intolerance     Major depressive disorder, recurrent episode, moderate 06/02/2023    Metastatic squamous cell carcinoma to lymph node 10/11/2018    Moderate episode of recurrent major depressive disorder 04/21/2021    Nephrostomy complication 10/26/2023    Neuropathy due to chemotherapeutic drug 11/14/2018    Osteoarthritis of back      Peritonitis 09/22/2020    Physical deconditioning 04/30/2021    Pseudomonas urinary tract infection 04/21/2021    Psychiatric problem     Pyelitis 09/09/2023    QT prolongation 04/16/2021    Refusal of blood transfusions as patient is Mandaen     Schatzki's ring 09/14/2015    Seen on outside EGD 05/2014, underwent esophageal dilatation. Bx were negative.     Seizure-like activity 12/02/2018    Seizures     Sleep stage dysfunction     Noted on PSG 06/2017; negative for obstructive sleep apnea     Stroke     Urinary tract infection associated with nephrostomy catheter 06/11/2020    Wound infection after surgery 09/24/2020       Past Surgical History:   Procedure Laterality Date    ANTEGRADE NEPHROSTOGRAPHY Bilateral 9/28/2020    Procedure: Nephrostogram - antegrade;  Surgeon: Leslie Balbuena MD;  [facility];  Service: Urology;  Laterality: Bilateral;    ANTEGRADE NEPHROSTOGRAPHY Left 4/20/2021    Procedure: NEPHROSTOGRAM, ANTEGRADE;  Surgeon: Leslie Balbuena MD;  [facility];  Service: Urology;  Laterality: Left;    ANTEGRADE NEPHROSTOGRAPHY Right 10/27/2022    Procedure: NEPHROSTOGRAM, ANTEGRADE;  Surgeon: Chirag Russ MD;  [facility];  Service: Urology;  Laterality: Right;    ANTEGRADE NEPHROSTOGRAPHY Right 4/21/2023    Procedure: NEPHROSTOGRAM, ANTEGRADE;  Surgeon: Chirag Russ MD;  Location: [facility] OR 2ND FLR;  Service: Urology;  Laterality: Right;    ANTEGRADE NEPHROSTOGRAPHY Left 6/6/2023    Procedure: Nephrostogram - antegrade;  Surgeon: Leslie Balbuena MD;  [facility];  Service: Urology;  Laterality: Left;    BILATERAL OOPHORECTOMY  2015    CHOLECYSTECTOMY  11/09/2016    Done at [facility], path showed chronic cholecystitis and gallstones    cold knife conization  2014    COLECTOMY, RIGHT  9/17/2020    Procedure: COLECTOMY, RIGHT Extended;  Surgeon: Hammad Reynolds MD;  Location: [facility] OR 2ND FLR;  Service: Colon and Rectal;;     COLONOSCOPY  2014    COLONOSCOPY N/A 10/24/2016    at [facility], Dr Gutiérrez    COLONOSCOPY N/A 5/18/2018    Procedure: COLONOSCOPY;  Surgeon: Arden Gutiérrez MD;  Location: [facility] (4TH FLR);  Service: Endoscopy;  Laterality: N/A;    COLONOSCOPY N/A 7/28/2020    Procedure: COLONOSCOPY;  Surgeon: Hammad Reynolds MD;  Location: [facility] (4TH FLR);  Service: Colon and Rectal;  Laterality: N/A;  covid test [place] 7/25    CYSTOSCOPY WITH URETEROSCOPY, RETROGRADE PYELOGRAPHY, AND INSERTION OF STENT Bilateral 3/21/2020    Procedure: CYSTOSCOPY, WITH RETROGRADE PYELOGRAM,;  Surgeon: Leslie Balbuena MD;  Location: [facility] OR 1ST FLR;  Service: Urology;  Laterality: Bilateral;    DILATION OF NEPHROSTOMY TRACT Right 10/27/2022    Procedure: DILATION, NEPHROSTOMY TRACT;  Surgeon: Chirag Russ MD;  Location: [facility] OR 1ST FLR;  Service: Urology;  Laterality: Right;    ESOPHAGOGASTRODUODENOSCOPY  2014    ESOPHAGOGASTRODUODENOSCOPY  11/18/2020    ESOPHAGOGASTRODUODENOSCOPY N/A 11/18/2020    Procedure: ESOPHAGOGASTRODUODENOSCOPY (EGD);  Surgeon: Zenon Spencer MD;  Location: [facility];  Service: Endoscopy;  Laterality: N/A;    ESOPHAGOGASTRODUODENOSCOPY N/A 12/11/2020    Procedure: EGD (ESOPHAGOGASTRODUODENOSCOPY);  Surgeon: Juancho Muse MD;  Location: [facility] (2ND FLR);  Service: Endoscopy;  Laterality: N/A;    HYSTERECTOMY  2014    [facility] for cervical cancer    ILEOSTOMY  9/17/2020    Procedure: CREATION, ILEOSTOMY;  Surgeon: Hammad Reynolds MD;  Location: [facility] OR 2ND FLR;  Service: Colon and Rectal;;    LOOPOGRAM N/A 4/20/2021    Procedure: LOOPOGRAM;  Surgeon: Leslie Balbuena MD;  Location: [facility] OR 1ST FLR;  Service: Urology;  Laterality: N/A;    LOOPOGRAM N/A 6/6/2023    Procedure: LOOPOGRAM;  Surgeon: Leslie Balbuena MD;  Location: [facility] OR 1ST FLR;  Service: Urology;  Laterality: N/A;    MOBILIZATION OF SPLENIC FLEXURE N/A 9/11/2020    Procedure: MOBILIZATION, COLONIC;  Surgeon: Hammad Reynolds MD;   Location: [facility] OR 2ND FLR;  Service: Colon and Rectal;  Laterality: N/A;    NEPHROSTOGRAPHY Bilateral 4/17/2021    Procedure: Nephrostogram;  Surgeon: Celeste Surgeon;  Location: [facility];  Service: Anesthesiology;  Laterality: Bilateral;    OOPHORECTOMY      PERCUTANEOUS NEPHROLITHOTOMY Right 4/21/2023    Procedure: NEPHROLITHOTOMY, PERCUTANEOUS;  Surgeon: Chirag Russ MD;  Location: [facility] OR 2ND FLR;  Service: Urology;  Laterality: Right;  2.5 hrs    PERCUTANEOUS NEPHROSTOMY  4/21/2023    Procedure: CREATION, NEPHROSTOMY, PERCUTANEOUS with removal of existing nephrostomy tube;  Surgeon: Chirag Russ MD;  Location: [facility] OR 2ND FLR;  Service: Urology;;    PYELOSCOPY Right 10/27/2022    Procedure: PYELOSCOPY;  Surgeon: Chirag Russ MD;  [facility];  Service: Urology;  Laterality: Right;    REPLACEMENT OF NEPHROSTOMY TUBE Right 10/27/2022    Procedure: REPLACEMENT, NEPHROSTOMY TUBE;  Surgeon: Chirag Russ MD;  [facility];  Service: Urology;  Laterality: Right;    RETROPERITONEAL LYMPHADENECTOMY Right 9/17/2018    Procedure: LYMPHADENECTOMY, RETROPERITONEUM;  Surgeon: Sebas Reed MD;  [facility]  Service: General;  Laterality: Right;  ILIAC    URETEROSCOPIC REMOVAL OF URETERIC CALCULUS Right 10/27/2022    Procedure: REMOVAL, CALCULUS, URETER, URETEROSCOPIC;  Surgeon: Chirag Russ MD;  [facility];  Service: Urology;  Laterality: Right;    URETEROSCOPY Right 10/27/2022    Procedure: URETEROSCOPY-ANTEGRADE;  Surgeon: Chirag Russ MD;  Location: [facility] OR [facility] FLR;  Service: Urology;  Laterality: Right;       Review of patient's allergies indicates:   Allergen Reactions    Bee sting [allergen ext-venom-honey bee]      Rash      Grass pollen-bermuda, standard      rash     Current Facility-Administered Medications   Medication Frequency    acetaminophen tablet 650 mg Q4H PRN    ceFEPIme (MAXIPIME) 1 g in dextrose 5 % in water  (D5W) 100 mL IVPB (MB+) Q12H    cetirizine tablet 10 mg Daily PRN    enoxaparin injection 40 mg Daily    HYDROmorphone injection 1 mg Once    HYDROmorphone injection 1 mg Q4H PRN    melatonin tablet 6 mg Nightly PRN    miconazole NITRATE 2 % top powder BID    mirtazapine tablet 30 mg Nightly    naloxone 0.4 mg/mL injection 0.02 mg PRN    ondansetron injection 4 mg Q8H PRN    polyethylene glycol packet 17 g BID PRN    prochlorperazine injection Soln 5 mg Q6H PRN    sodium bicarbonate tablet 1,300 mg TID    sodium chloride 0.9% flush 10 mL Q6H PRN     Family History       Problem Relation (Age of Onset)    Breast cancer Maternal Aunt    Cancer Father, Mother    Cervical cancer Mother    Colon cancer Father    Drug abuse Daughter, Daughter    Esophageal cancer Father    Heart disease Sister, Maternal Grandmother    Suicide Daughter          Tobacco Use    Smoking status: Never    Smokeless tobacco: Never   Substance and Sexual Activity    Alcohol use: No     Alcohol/week: 0.0 standard drinks of alcohol    Drug use: No    Sexual activity: Yes     Partners: Male     Birth control/protection: None     Comment:  19 years since 1999     Review of Systems   Constitutional:  Positive for appetite change and fatigue. Negative for fever.   HENT:  Negative for trouble swallowing.    Respiratory:  Negative for cough, chest tightness and shortness of breath.    Cardiovascular:  Negative for chest pain, palpitations and leg swelling.   Gastrointestinal:  Positive for abdominal pain and diarrhea (increased ostomy output, loose). Negative for abdominal distention, constipation, nausea and vomiting.   Genitourinary:  Positive for decreased urine volume.        Does not urinate through urethra, all urine goes to left nephrostomy bag   Skin:  Negative for color change and rash.   Neurological:  Positive for weakness. Negative for dizziness, syncope, light-headedness and headaches.   Psychiatric/Behavioral:  Negative for  confusion.      Objective:     Vital Signs (Most Recent):  Temp: 97.9 °F (36.6 °C) (12/12/23 1619)  Pulse: 74 (12/12/23 1619)  Resp: 16 (12/12/23 1619)  BP: (!) 106/55 (12/12/23 1619)  SpO2: 100 % (12/12/23 1619) Vital Signs (24h Range):  Temp:  [97.2 °F (36.2 °C)-98.7 °F (37.1 °C)] 97.9 °F (36.6 °C)  Pulse:  [68-78] 74  Resp:  [16-20] 16  SpO2:  [96 %-100 %] 100 %  BP: ()/(53-61) 106/55     Weight: 81 kg (178 lb 9.2 oz) (12/11/23 0602)  Body mass index is 27.97 kg/m².  Body surface area is 1.96 meters squared.    I/O last 3 completed shifts:  In: 220 [P.O.:120; IV Piggyback:100]  Out: 3010 [Urine:1335; Stool:1675]     Physical Exam  Vitals and nursing note reviewed.   Constitutional:       General: She is not in acute distress.     Appearance: Normal appearance. She is not ill-appearing.      Comments: Sitting upright at the edge of her bed eating lunch, comfortable and NAD   HENT:      Head: Normocephalic and atraumatic.   Cardiovascular:      Rate and Rhythm: Normal rate and regular rhythm.      Heart sounds: Normal heart sounds. No murmur heard.  Pulmonary:      Effort: Pulmonary effort is normal. No respiratory distress.      Breath sounds: Normal breath sounds. No wheezing or rales.   Abdominal:      General: Abdomen is flat.      Palpations: Abdomen is soft.      Comments: Ostomy present on right side of abdomen with watery, yellow/brown output   Genitourinary:     Comments: Left nephrostomy tube in place, draining clear, yellow urine  Musculoskeletal:      Cervical back: Normal range of motion. No rigidity.      Right lower leg: No edema.      Left lower leg: No edema.   Skin:     General: Skin is warm and dry.      Coloration: Skin is not jaundiced.   Neurological:      General: No focal deficit present.      Mental Status: She is alert. Mental status is at baseline.   Psychiatric:         Mood and Affect: Mood normal.         Behavior: Behavior normal.          Significant Labs:  All labs within the  past 24 hours have been reviewed.    Significant Imaging:  Reviewed

## 2023-12-12 NOTE — ASSESSMENT & PLAN NOTE
ODESSA - resolved   BMP reviewed- noted Estimated Creatinine Clearance: 46.8 mL/min (based on SCr of 1.4 mg/dL). according to latest data. Based on current GFR, CKD stage is stage 3 - GFR 30-59.  Monitor UOP and serial BMP and adjust therapy as needed. Renally dose meds. Avoid nephrotoxic medications and procedures.    Cr 1.9, baseline 1.4  Likely iso of pyelonephritis vs dehydration   FeNA 0.2%, Received gentle IVF hydration  Abx as above  ODESSA resolved.

## 2023-12-12 NOTE — SUBJECTIVE & OBJECTIVE
Interval History: AFVSS. AM labs pending. Pain controlled. Ucx from 12/7 growing GNRs. On cefepime.     LNT: 125/175  Urostomy: 450/150      Objective:     Temp:  [97.2 °F (36.2 °C)-98.7 °F (37.1 °C)] 97.2 °F (36.2 °C)  Pulse:  [69-81] 70  Resp:  [16-20] 16  SpO2:  [96 %-97 %] 97 %  BP: ()/(51-61) 99/53     Body mass index is 27.97 kg/m².           Drains       Drain  Duration                  Urostomy 09/11/20 0000 ileal conduit LLQ 1187 days         Ileostomy 09/18/20 RLQ 1180 days         Nephrostomy 10/26/23 0933 Left 12 Fr. 46 days                     Physical Exam  Vitals and nursing note reviewed.   Constitutional:       General: She is not in acute distress.  HENT:      Head: Atraumatic.      Nose: Nose normal.   Eyes:      Extraocular Movements: Extraocular movements intact.   Cardiovascular:      Rate and Rhythm: Normal rate.   Pulmonary:      Effort: Pulmonary effort is normal.   Abdominal:      General: Abdomen is flat. There is no distension.      Tenderness: There is no abdominal tenderness. There is no right CVA tenderness or left CVA tenderness.      Comments: RLQ ostomy with liquid stool, pink and patent ostomy  LLQ ostomy with clear yellow urine, pink and patent ostomy  Left neph tube insertion site without erythema, nontender to palpation      Musculoskeletal:         General: Normal range of motion.      Cervical back: Normal range of motion.   Skin:     Coloration: Skin is not jaundiced.   Neurological:      General: No focal deficit present.      Mental Status: She is alert and oriented to person, place, and time.   Psychiatric:         Mood and Affect: Mood normal.         Behavior: Behavior normal.           Significant Labs:    BMP:  Recent Labs   Lab 12/09/23  0246 12/10/23  0355 12/11/23  0828    140 139   K 3.6 3.5 3.6   * 115* 113*   CO2 15* 16* 18*   BUN 31* 22* 18   CREATININE 1.9* 1.6* 1.3   CALCIUM 9.4 9.4 9.2       CBC:   Recent Labs   Lab 12/09/23 0246  12/10/23  0355 12/11/23  0828   WBC 10.81 6.88 5.75   HGB 11.9* 11.4* 10.9*   HCT 37.3 36.6* 34.7*    312 305       All pertinent labs results from the past 24 hours have been reviewed.    Significant Imaging:  All pertinent imaging results/findings from the past 24 hours have been reviewed.

## 2023-12-12 NOTE — PLAN OF CARE
12/12/23 1639   Discharge Reassessment   Assessment Type Discharge Planning Reassessment   Did the patient's condition or plan change since previous assessment? No   Discharge Plan A Home with family   Discharge Plan B Home Health     Interval Oxfxyxh76/12/23: Urine cx growing GNR, ID following. Continue empiric cefepime. CRS signed off. No plans for IP procedure. Rec Op follow up. Urology following. Left nephrostomy tube was found to be clogged, and was irrigated yesterday by Urology, is now draining urine freely. PT/OT eval pending    SW will continue to follow up for discharge planning needs.      Sultana Sagastume LMSW  [facility]  Ext. [phone]

## 2023-12-12 NOTE — PT/OT/SLP EVAL
Physical Therapy Evaluation    Patient Name:  Edita Riley   MRN:  [id number]  Admit Date: 12/7/2023  Admitting Diagnosis:  Pyelonephritis  Length of Stay: 4 days  Recent Surgery: * No surgery found *      Recommendations:     Discharge Recommendations:  Low intensity therapy at discharge  Discharge Equipment Recommendations: none   Barriers to discharge: None    Highest Level of Mobility: walked ~60 feet  Assistance Needed: contact guard assistance    Assessment:     Edita Riley is a 58 y.o. female admitted with a medical diagnosis of Pyelonephritis. Medical history includes cancer and left sided weakness. She is most limited today by decreased endurance. Today, she was able to perform standing, transfers, and gait training. Based on clinical presentation, co-morbidities, and today's performance, patient would benefit from acute skilled physical therapy services to improve functional mobility and return to max capacity prior level of function. Patient currently demonstrates a need for low intensity therapy services after discharging from the hospital.  See detailed evaluation below:    Problem List: weakness, impaired endurance, impaired self care skills, impaired functional mobility, gait instability, impaired balance, decreased coordination  Rehab Prognosis: Good    Plan:     During this hospitalization, patient to be seen 4 x/week to address the identified impairments via gait training, therapeutic activities, therapeutic exercises, neuromuscular re-education and progress towards the established goals.    Plan of Care Expires:  01/11/24    Subjective   Communicated with RN prior to session.  Patient found sitting edge of bed upon PT entry to room, agreeable to evaluation.     Chief Complaint: Vomiting (Arrives via EMS with c/o N/V that started 3 days )    Patient/Family Comments/goals: to go home  Pain/Comfort:  Pain Rating 1: 0/10  Pain Rating Post-Intervention 1: 0/10    Living  "Environment:  Patient lives with her  in a ground level apartment with 4 steps to enter (L handrail).     Prior Level of Function:   Patient reports being modified independent with mobility & with ADLs. Patient owns DME as follows: wheelchair, walker, rolling.     Patient reports they will have assistance from her  upon discharge.    Objective:   Patient found with: telemetry, peripheral IV     General Precautions: Standard, fall   Orthopedic Precautions:N/A   Braces: N/A   Oxygen Device: Room Air  Vitals: BP (!) 108/53 (BP Location: Right arm, Patient Position: Sitting)   Pulse 72   Temp 97.5 °F (36.4 °C) (Oral)   Resp 16   Ht 5' 7" (1.702 m)   Wt 81 kg (178 lb 9.2 oz)   LMP 06/08/2014 (Approximate)   SpO2 100%   Breastfeeding No   BMI 27.97 kg/m²     Exams:  Cognition:   Alert and Cooperative  AxOx4  Command following: Follows multistep  commands  Fluency: dysarthria  Hearing: Intact  Vision:  Intact visual fields  Skin Integrity: intact  Sensation: intact  Coordination: mildly impaired  LE Strength:  L Lower Extremity: grossly 3-/5  R Lower Extremity: grossly 3/5  LE ROM:  L Lower Extremity: WFL  R Lower Extremity: WFL      Outcome Measures:  AM-PAC 6 CLICK MOBILITY  Turning over in bed (including adjusting bedclothes, sheets and blankets)?: 3  Sitting down on and standing up from a chair with arms (e.g., wheelchair, bedside commode, etc.): 3  Moving from lying on back to sitting on the side of the bed?: 3  Moving to and from a bed to a chair (including a wheelchair)?: 3  Need to walk in hospital room?: 3  Climbing 3-5 steps with a railing?: 3  Basic Mobility Total Score: 18     Functional Mobility:    Transfers:   Sit <> Stand Transfer: minimum assistance with no assistive device   Standing Tolerance: contact guard assistance with no assistive device   Deviations: flexed posture  Bed <> Chair Transfer: Step Transfer technique with contact guard assistance with no assistive " device    Gait:   Patient ambulated: ~60 feet   Patient required: contact guard  Patient used: no assistive device  Gait Deviation(s): decreased speed, decreased step length, antalgic pattern, and generalized instability  Cueing provided for: step sequencing and upright posture    Education:   Patient was educated on the following:  Role of PT, plan of care, and goals  In room safety and use of call button  Importance of continued upright mobility and exercise  Balancing rest and activity      Patient left sitting edge of bed with all lines intact, call button in reach, and RN notified.    GOALS:   Multidisciplinary Problems       Physical Therapy Goals          Problem: Physical Therapy    Goal Priority Disciplines Outcome Goal Variances Interventions   Physical Therapy Goal     PT, PT/OT Ongoing, Progressing     Description: PT goals to be met by: 1/12/23    Patient will perform rolling each way with supervision.   Patient will perform supine <> sitting with supervision.  Patient will perform sit <> stand transitions with supervision using RW.  Patient will perform transfers from bed <> chair or BSC with supervision using RW.  Patient will ambulate 200 feet with supervision using RW.  Patient will ascend/descend 4 steps using handrails with supervision.                       History:     Past Medical History:   Diagnosis Date    Abnormal mammogram 08/25/2020    Abnormal mammogram 08/25/2020    Acute blood loss anemia     Acute deep vein thrombosis (DVT) of lower extremity 12/09/2020    Advance care planning 04/30/2021    ODESSA (acute kidney injury) 03/21/2020    Anemia due to chronic blood loss     Anemia due to chronic kidney disease     Anemia due to chronic kidney disease 05/18/2020    Anxiety     Bilateral ureteral obstruction 09/11/2020    Bilious vomiting with nausea 06/11/2023    Cardiovascular event risk -- low 09/14/2015    ASCVD 10-year risk 1.9% (with optimal risk factors 1.3%) as of 9/14/2015      Cervical cancer 2014    Chronic back pain     Colostomy care     Deep vein thrombosis     Depression     Diarrhea due to malabsorption 11/14/2018    Difficult intubation     Discharge planning issues 04/30/2021    Disorder of kidney and ureter     DVT of lower extremity, bilateral 11/04/2020    Edema 04/10/2023    Emphysema of lung 04/10/2023    Fibromyalgia     Fungemia 09/27/2020    Generalized abdominal pain 08/25/2020    GERD (gastroesophageal reflux disease)     Hemifacial spasm 09/16/2015    Hiatal hernia 2014    History of cervical cancer 10/11/2018    History of essential hypertension 05/04/2015    Not requiring medications at this time  BP is low- concern that this may correlate with increased stool output from stoma. Encourage her to contact GI and her PCP.    Hx of psychiatric care     Cymbalta, trazodone    Hypertension     Hypomagnesemia 11/21/2018    Hyponatremia 09/10/2023    Impaired functional mobility, balance, gait, and endurance 07/24/2015    Lactose intolerance     Major depressive disorder, recurrent episode, moderate 06/02/2023    Metastatic squamous cell carcinoma to lymph node 10/11/2018    Moderate episode of recurrent major depressive disorder 04/21/2021    Nephrostomy complication 10/26/2023    Neuropathy due to chemotherapeutic drug 11/14/2018    Osteoarthritis of back     Peritonitis 09/22/2020    Physical deconditioning 04/30/2021    Pseudomonas urinary tract infection 04/21/2021    Psychiatric problem     Pyelitis 09/09/2023    QT prolongation 04/16/2021    Refusal of blood transfusions as patient is [employer]     Schatzki's ring 09/14/2015    Seen on outside EGD 05/2014, underwent esophageal dilatation. Bx were negative.     Seizure-like activity 12/02/2018    Seizures     Sleep stage dysfunction     Noted on PSG 06/2017; negative for obstructive sleep apnea     Stroke     Urinary tract infection associated with nephrostomy catheter 06/11/2020    Wound infection after surgery  09/24/2020       Past Surgical History:   Procedure Laterality Date    ANTEGRADE NEPHROSTOGRAPHY Bilateral 9/28/2020    Procedure: Nephrostogram - antegrade;  Surgeon: Leslie Balbuena MD;  Location: [facility] OR 1ST FLR;  Service: Urology;  Laterality: Bilateral;    ANTEGRADE NEPHROSTOGRAPHY Left 4/20/2021    Procedure: NEPHROSTOGRAM, ANTEGRADE;  Surgeon: Leslie Balbuena MD;  Location: [facility] OR 1ST FLR;  Service: Urology;  Laterality: Left;    ANTEGRADE NEPHROSTOGRAPHY Right 10/27/2022    Procedure: NEPHROSTOGRAM, ANTEGRADE;  Surgeon: Chirag Russ MD;  Location: [facility] OR 1ST FLR;  Service: Urology;  Laterality: Right;    ANTEGRADE NEPHROSTOGRAPHY Right 4/21/2023    Procedure: NEPHROSTOGRAM, ANTEGRADE;  Surgeon: Chirag Russ MD;  Location: [facility] OR 2ND FLR;  Service: Urology;  Laterality: Right;    ANTEGRADE NEPHROSTOGRAPHY Left 6/6/2023    Procedure: Nephrostogram - antegrade;  Surgeon: Leslie Balbuena MD;  Location: [facility] OR 1ST FLR;  Service: Urology;  Laterality: Left;    BILATERAL OOPHORECTOMY  2015    CHOLECYSTECTOMY  11/09/2016    Done at [facility], path showed chronic cholecystitis and gallstones    cold knife conization  2014    COLECTOMY, RIGHT  9/17/2020    Procedure: COLECTOMY, RIGHT Extended;  Surgeon: Hammad Reynolds MD;  Location: [facility] OR 2ND FLR;  Service: Colon and Rectal;;    COLONOSCOPY  2014    COLONOSCOPY N/A 10/24/2016    at [facility], Dr Thomas    COLONOSCOPY N/A 5/18/2018    Procedure: COLONOSCOPY;  Surgeon: Arden Gutiérrez MD;  Location: [facility] ENDO (4TH FLR);  Service: Endoscopy;  Laterality: N/A;    COLONOSCOPY N/A 7/28/2020    Procedure: COLONOSCOPY;  Surgeon: Hammad Ryenolds MD;  Location: [facility] ENDO (4TH FLR);  Service: Colon and Rectal;  Laterality: N/A;  covid test [place] 7/25    CYSTOSCOPY WITH URETEROSCOPY, RETROGRADE PYELOGRAPHY, AND INSERTION OF STENT Bilateral 3/21/2020    Procedure: CYSTOSCOPY, WITH RETROGRADE PYELOGRAM,;  Surgeon: Leslie Balbuena MD;  Location: [facility] OR 1ST  FLR;  Service: Urology;  Laterality: Bilateral;    DILATION OF NEPHROSTOMY TRACT Right 10/27/2022    Procedure: DILATION, NEPHROSTOMY TRACT;  Surgeon: Chirag Russ MD;  Location: [facility] OR 1ST FLR;  Service: Urology;  Laterality: Right;    ESOPHAGOGASTRODUODENOSCOPY  2014    ESOPHAGOGASTRODUODENOSCOPY  11/18/2020    ESOPHAGOGASTRODUODENOSCOPY N/A 11/18/2020    Procedure: ESOPHAGOGASTRODUODENOSCOPY (EGD);  Surgeon: Zenon Spencer MD;  Location: [facility] ENDO;  Service: Endoscopy;  Laterality: N/A;    ESOPHAGOGASTRODUODENOSCOPY N/A 12/11/2020    Procedure: EGD (ESOPHAGOGASTRODUODENOSCOPY);  Surgeon: Juancho Muse MD;  Location: [facility] (2ND FLR);  Service: Endoscopy;  Laterality: N/A;    HYSTERECTOMY  2014    [facility] for cervical cancer    ILEOSTOMY  9/17/2020    Procedure: CREATION, ILEOSTOMY;  Surgeon: Hammad Reynolds MD;  Location: [facility] OR 2ND FLR;  Service: Colon and Rectal;;    LOOPOGRAM N/A 4/20/2021    Procedure: LOOPOGRAM;  Surgeon: Leslie Balbuena MD;  Location: [facility] OR 1ST FLR;  Service: Urology;  Laterality: N/A;    LOOPOGRAM N/A 6/6/2023    Procedure: LOOPOGRAM;  Surgeon: Leslie Balbuena MD;  Location: [facility] OR 1ST FLR;  Service: Urology;  Laterality: N/A;    MOBILIZATION OF SPLENIC FLEXURE N/A 9/11/2020    Procedure: MOBILIZATION, COLONIC;  Surgeon: Hammad Reynolds MD;  Location: [facility] OR 2ND FLR;  Service: Colon and Rectal;  Laterality: N/A;    NEPHROSTOGRAPHY Bilateral 4/17/2021    Procedure: Nephrostogram;  Surgeon: Celeste Surgeon;  Location: [facility] CELESTE;  Service: Anesthesiology;  Laterality: Bilateral;    OOPHORECTOMY      PERCUTANEOUS NEPHROLITHOTOMY Right 4/21/2023    Procedure: NEPHROLITHOTOMY, PERCUTANEOUS;  Surgeon: Chirag Russ MD;  Location: [facility] OR 2ND FLR;  Service: Urology;  Laterality: Right;  2.5 hrs    PERCUTANEOUS NEPHROSTOMY  4/21/2023    Procedure: CREATION, NEPHROSTOMY, PERCUTANEOUS with removal of existing nephrostomy tube;  Surgeon: Chirag Russ MD;  Location: [facility]  OR 2ND FLR;  Service: Urology;;    PYELOSCOPY Right 10/27/2022    Procedure: PYELOSCOPY;  Surgeon: Chirag Russ MD;  [facility]  Service: Urology;  Laterality: Right;    REPLACEMENT OF NEPHROSTOMY TUBE Right 10/27/2022    Procedure: REPLACEMENT, NEPHROSTOMY TUBE;  Surgeon: Chirag Russ MD;  [facility];  Service: Urology;  Laterality: Right;    RETROPERITONEAL LYMPHADENECTOMY Right 9/17/2018    Procedure: LYMPHADENECTOMY, RETROPERITONEUM;  Surgeon: Sebas Reed MD;  Location: [facility] OR 2ND FLR;  Service: General;  Laterality: Right;  ILIAC    URETEROSCOPIC REMOVAL OF URETERIC CALCULUS Right 10/27/2022    Procedure: REMOVAL, CALCULUS, URETER, URETEROSCOPIC;  Surgeon: Chirag Russ MD;  Location: [facility] OR [address] [place] [address];  Service: Urology;  Laterality: Right;    URETEROSCOPY Right 10/27/2022    Procedure: URETEROSCOPY-ANTEGRADE;  Surgeon: Chirag Russ MD;  Location: [facility] OR [address] [place] [address];  Service: Urology;  Laterality: Right;       Time Tracking:     PT Received On: 12/12/23  PT Start Time: 1033     PT Stop Time: 1043  PT Total Time (min): 10 min     Billable Minutes: Evaluation 10    Megha Sagastume PT, DPT  12/12/2023

## 2023-12-12 NOTE — ASSESSMENT & PLAN NOTE
59 yo F with complicated ureteral medical history, now s/p left nephrostomy tube placement (as well as right, removed in 2021) and ileostomy complicated by recurrent MDR infections, CKDIIIa, and htn admitted 12/7 MDR Klebsiella UTI, as well as clogged left nephrostomy tube, s/p irrigation with urology. UTI treated with cefepime. Blood cultures have remained NGTD.    - Initially admitted with ODESSA with Cr 1.6, which then increased to 1.9. This has improved to baseline Cr of 1.3- 1.4 with IV fluids.     Nephrology has been consulted for acidosis. HCO3 on admission 12. She was started on NaHCO3 650mg TID with subsequent increase to 16. Dose was increased to 1300 TID. HCO3 at time of consult 18. Per chart review, her recent baseline HCO3 over the last several months has been 18-20.   --- VBG with pH 7.19, pCO2 58.5. Anion gap ~11.5. This is consistent with primary respiratory acidosis as well as concomitant non anion gap metabolic acidosis. The metabolic acidosis is currently at baseline. Likely due to GI losses, high output ileostomy state contributes to significant HCO3 loss. Reason for respiratory acidosis unclear. She has a history of emphysema noted on CT in 2020, however recent imaging is without emphysematous changes. No prior PFTs. She has had a prior polysomnography that was negative for JENN. This appears to be her first elevated CO2    Recommendations:  - Can give isotonic bicarb (150mEq) at 100 ml/hr x 10 hours for total 1L administration to assist with the acidosis as well as fluid resuscitation. If she could still benefit from resuscitation following completion, can change to LR  - Consider addition of octreotide to decrease GI secretions  - Would repeat VBG. If CO2 still elevated, would consider pulmonology consult for evaluation of respiratory acidosis

## 2023-12-12 NOTE — PLAN OF CARE
Problem: Physical Therapy  Goal: Physical Therapy Goal  Description: PT goals to be met by: 1/12/23    Patient will perform rolling each way with supervision.   Patient will perform supine <> sitting with supervision.  Patient will perform sit <> stand transitions with supervision using RW.  Patient will perform transfers from bed <> chair or BSC with supervision using RW.  Patient will ambulate 200 feet with supervision using RW.  Patient will ascend/descend 4 steps using handrails with supervision.  Outcome: Ongoing, Progressing

## 2023-12-12 NOTE — CONSULTS
Patient consult for wound care to ileostomy, the patient states that she only need supplies and she is well educated on the maintenance  care to the site. Bags were given to her. Please reconsult wound care if need.

## 2023-12-12 NOTE — PROGRESS NOTES
[facility]  Infectious Disease  Progress Note    Patient Name: Edita Riley  MRN: [id number]  Admission Date: 12/7/2023  Length of Stay: 4 days  Attending Physician: Jamar Campbell MD  Primary Care Provider: Trina Vu MD    Isolation Status: No active isolations  Assessment/Plan:      ID  * Klebsiella pneumoniae UTI    58 year old female with history of cervical CA s/p chemotherapy and XRT c/b bilateral ureteral strictures requiring bilateral nephrostomy tubes (currently left tube remains only; right removed in April), s/p urinary to colon conduit 2020 c/b bowel perforation s/p right hemicolectomy and ostomy and recurrent UTIs who presented to the ED with complaints of weakness, nausea, abdominal pain.     CT reviewed. UA positive. Urine culture from left nephrostomy tube is positive for Klebsiella pneumoniae.  Blood cultures NGTD. Patient is on Cefepime. Afebrile. Leukocytosis resolved. Clinically improved. Urology is following. Flushed NT and changed bag. No urologic intervention planned this admission.     Recommendations  Continue Cefepime while inpatient (today is day 5)  At discharge, can complete antibiotic course with a one time dose of IV Tobramycin as remains highly concentrated in the urine for days  Follow up with Urology as an outpatient as planned  Discussed plan with ID staff. ID will sign off.            Thank you for your consult. I will sign off. Please contact us if you have any additional questions.    Megha Zhang PA-C  Infectious Disease  [facility]    Subjective:     Principal Problem:Pyelonephritis    HPI: 59 y/o AAF hx of Cervical Cancer s/p XRT & Bilateral Ureteral strictures with obstructive uropathy now s/p transverse colon conduit, s/p Left Nephrostomy tube, s/p Ileostomy, hx of MDR UTI/pyelonephritis presents to the ED with complaints of abdominal pain.  Since Monday 12/4, she has had recurrent nausea vomiting over 3 days, poor  diet/oral intake with associated weakness.      2 recent hospitalizations with left sided pyelonephritis, prior urine cultures show different MDR bacteria that have grown.  She is pending evaluation by colo-rectal surgery for outpatient revision of urostomy, follows with Dr. Balbuena (urology)      Reported having epigastric pain 10/10 on arrival in ED  9/10 only mild relief with initial morphine dosage.      Patient admitted and UA/UCx obtained.  UA with WBC >100, 3+ leukocytes.  Started on cefepime.  Blood cultures NGTD. WBC 13-14.  ID consulted for abx recs.  No imaging done.    Patient reports feeling a little better.  Still with L sided back pain.  The patient denies any recent fever, chills, or sweats.  N/V resolved.    Interval History:   No AEON.  Afebrile and WBC WNL.  Feeling better today. Still with poor PO intake and asking for boost.   Left PCNT irrigated and bag replaced by Urology team.   Urine cx + kleb pna.       Review of Systems   Constitutional:  Positive for appetite change (poor). Negative for activity change, chills, diaphoresis and fever.   Respiratory:  Negative for cough, shortness of breath and wheezing.    Cardiovascular:  Negative for chest pain.   Gastrointestinal:  Negative for abdominal pain, constipation, diarrhea, nausea and vomiting.   Genitourinary:  Negative for dysuria, frequency and urgency.   Neurological:  Negative for dizziness.   Hematological:  Does not bruise/bleed easily.     Objective:     Vital Signs (Most Recent):  Temp: 97.5 °F (36.4 °C) (12/12/23 1228)  Pulse: 72 (12/12/23 1228)  Resp: 16 (12/12/23 1228)  BP: (!) 108/53 (12/12/23 1228)  SpO2: 100 % (12/12/23 1228) Vital Signs (24h Range):  Temp:  [97.2 °F (36.2 °C)-98.7 °F (37.1 °C)] 97.5 °F (36.4 °C)  Pulse:  [68-81] 72  Resp:  [16-20] 16  SpO2:  [96 %-100 %] 100 %  BP: ()/(53-61) 108/53     Weight: 81 kg (178 lb 9.2 oz)  Body mass index is 27.97 kg/m².    Estimated Creatinine Clearance: 46.8 mL/min (based on  SCr of 1.4 mg/dL).     Physical Exam  Constitutional:       General: She is not in acute distress.     Appearance: Normal appearance. She is well-developed. She is not ill-appearing, toxic-appearing or diaphoretic.   HENT:      Head: Normocephalic and atraumatic.   Cardiovascular:      Rate and Rhythm: Normal rate and regular rhythm.   Pulmonary:      Effort: Pulmonary effort is normal. No respiratory distress.      Breath sounds: Normal breath sounds. No wheezing or rales.   Abdominal:      General: There is no distension.      Palpations: Abdomen is soft.      Tenderness: There is no abdominal tenderness. There is no guarding.      Comments: RLQ ostomy with liquid stool  LLQ ostomy with clear urine   L NT site without surrounding cellulitis   Skin:     General: Skin is warm and dry.   Neurological:      Mental Status: She is alert and oriented to person, place, and time.   Psychiatric:         Mood and Affect: Mood normal.         Behavior: Behavior normal.         Thought Content: Thought content normal.          Significant Labs: Blood Culture:   Recent Labs   Lab 08/18/23  2043 09/09/23  1806 09/09/23  1807 12/07/23  1953 12/08/23  0155   LABBLOO No growth after 5 days.  No growth after 5 days. No growth after 5 days. No growth after 5 days. No Growth to date  No Growth to date  No Growth to date  No Growth to date  No Growth to date  No Growth to date  No Growth to date  No Growth to date  No Growth to date  No Growth to date No Growth to date  No Growth to date  No Growth to date  No Growth to date  No Growth to date  No Growth to date  No Growth to date  No Growth to date  No Growth to date  No Growth to date       CBC:   Recent Labs   Lab 12/11/23  0828 12/12/23  0500 12/12/23  1333   WBC 5.75 6.64  --    HGB 10.9* 11.4*  --    HCT 34.7* 35.8* 43    303  --        CMP:   Recent Labs   Lab 12/11/23  0828 12/12/23  0500    139   K 3.6 3.6   * 112*   CO2 18* 18*   GLU 92  99   BUN 18 18   CREATININE 1.3 1.4   CALCIUM 9.2 9.3   ANIONGAP 8 9       Urine Culture:   Recent Labs   Lab 09/09/23  1416 09/11/23  1226 10/21/23  2018 11/03/23  2119 12/07/23  2131   LABURIN ENTEROBACTER CLOACAE  >100,000 cfu/ml  *  ACINETOBACTER BAUMANNII/HAEMOLYTICUS  50,000 - 99,999 cfu/ml  * ENTEROCOCCUS FAECIUM VRE  10,000 - 49,999 cfu/ml  No other significant isolate  * ENTEROCOCCUS FAECALIS  >100,000 cfu/ml  No other significant isolate  * ESCHERICHIA COLI  >100,000 cfu/ml  * KLEBSIELLA PNEUMONIAE  >100,000 cfu/ml  *       Urine Studies:   Recent Labs   Lab 12/07/23 2131   COLORU [place]   APPEARANCEUA Cloudy*   PHUR 7.0   SPECGRAV 1.015   PROTEINUA 2+*   GLUCUA Negative   KETONESU Negative   BILIRUBINUA Negative   OCCULTUA 1+*   NITRITE Negative   LEUKOCYTESUR 3+*   RBCUA 43*   WBCUA >100*   BACTERIA Many*   SQUAMEPITHEL 4   HYALINECASTS 0       All pertinent labs within the past 24 hours have been reviewed.    Significant Imaging: I have reviewed all pertinent imaging results/findings within the past 24 hours.  Procedure Component Value Units Date/Time   CT Abdomen Pelvis Without Contrast [[id number]] Resulted: 12/09/23 1516   Order Status: Completed Updated: 12/09/23 1519   Narrative:     EXAMINATION:  CT ABDOMEN PELVIS WITHOUT CONTRAST    CLINICAL HISTORY:  Pyelonephritis ;    TECHNIQUE:  Low dose axial images, sagittal and coronal reformations were obtained from the lung bases to the pubic symphysis.  Oral contrast was not administered.    COMPARISON:  11/03/2023.    FINDINGS:  There are linear opacities within the lung bases consistent with linear atelectasis.  No pleural effusions are identified.  Bony structures appear intact.  There is no evidence for acute fracture or bone destruction.  The liver appears mildly enlarged.  The liver is homogeneous in density with no focal liver lesions identified.  The gallbladder is absent.  The spleen, stomach, and pancreas all appear unremarkable.  The adrenal  glands are not enlarged.  There is a left-sided nephrostomy tube present.  There is bilateral hydronephrosis present.  There is a small air bubble identified within the left renal collecting system.  No renal or ureteral calculi are appreciated.  There are surgical changes related to patient's prior creation of transverse colon conduit for urinary diversion.  The abdominal aorta tapers normally without aneurysmal dilatation.  No para-aortic lymphadenopathy is identified.  The patient has bilateral ostomies.  No dilated loops of bowel are evident.  Patient is status post hysterectomy.  No abnormal adnexal masses are appreciated.  The urinary bladder is decompressed.  There is no evidence for pelvic or inguinal lymphadenopathy.   Impression:       Surgical changes with bilateral abdominal ostomies present as before.    Bilateral hydronephrosis and proximal hydroureter despite the presence of a left sided nephrostomy tube which appears in satisfactory position.  Note the patient has a history of bilateral ureteral obstruction with creation of transverse colon conduit.    Mild hepatomegaly.    S/p hysterectomy.  Status post cholecystectomy.      Electronically signed by: Oj Del Rio MD  Date: 12/09/2023  Time: 15:16   US Retroperitoneal Complete [[id number]] Resulted: 12/09/23 1053   Order Status: Completed Updated: 12/09/23 1055   Narrative:     EXAMINATION:  US RETROPERITONEAL COMPLETE    CLINICAL HISTORY:  ODESSA;    TECHNIQUE:  Ultrasound of the kidneys and urinary bladder was performed including color flow and Doppler evaluation of the kidneys.    COMPARISON:  CT abdomen pelvis 11/03/2023.  Retroperitoneal ultrasound 06/01/2023.    FINDINGS:  Right kidney: The right kidney measures 12.3 cm. No cortical thinning. No loss of corticomedullary distinction. Resistive index measures 0.78.  No mass. No renal stone. Moderate hydronephrosis.    Left kidney: The left kidney measures 12.6 cm.   No cortical thinning. No loss of  corticomedullary distinction. Resistive index measures 0.79.  No mass. No renal stone.Left nephrostomy tube.  Minimal residual caliectasis, without overt hydronephrosis.    Reported urostomy.  The bladder is not well visualized.    Splenic resistive index measures 0.56.   Impression:       Moderate right hydronephrosis, similar to prior.    Left nephrostomy.  No overt left hydronephrosis.    Electronically signed by resident: John Juarez  Date: 12/09/2023  Time: 10:14    Electronically signed by: Diomedes Chung Jr  Date: 12/09/2023  Time: 10:53     Imaging History    2023    Date Procedure Name Study Review Link PACS Link Status Accession Number Location   12/09/23 11:20 AM CT Abdomen Pelvis  Without Contrast Study Review  Images Final [id number] [facility]   12/09/23 09:46 AM US Retroperitoneal Complete Study Review  Images Final [id number] [facility]   12/09/23 02:14 PM CARDIAC MONITORING STRIPS Study Review  Final

## 2023-12-12 NOTE — ASSESSMENT & PLAN NOTE
Recent baseline GFR ~47.   - Trend daily Cr  - Renally dose medications   - Avoid contrast where possible

## 2023-12-12 NOTE — PLAN OF CARE
Problem: Occupational Therapy  Goal: Occupational Therapy Goal  Description: Pt is currently functioning at prior level  Outcome: Ongoing, Progressing

## 2023-12-12 NOTE — PT/OT/SLP EVAL
"Occupational Therapy   Evaluation and Discharge Note    Name: Edita Riley  MRN: [id number]  Admitting Diagnosis: Pyelonephritis  Recent Surgery: * No surgery found *      Recommendations:     Discharge Recommendations:    Discharge Equipment Recommendations: bath bench  Barriers to discharge:  None    Assessment:     Edita Riley is a 58 y.o. female with a medical diagnosis of Pyelonephritis. At this time, patient is functioning at their prior level of function and does not require further acute OT services.     Plan:     During this hospitalization, patient does not require further acute OT services.  Please re-consult if situation changes.    Plan of Care Reviewed with: patient    Subjective     Chief Complaint: none  Patient/Family Comments/goals: Pt stated, " my  has been taking good care of me for the past six years."    Occupational Profile:  Living Environment: Apartment with , 4 MANAV, tub shower  Previous level of function: Mod I for mobility, (I) for self care, Pt's  drives  Roles and Routines: enjoys being active, significant other, mother (two daughters have passed away)   Equipment Used at home: walker, rolling, wheelchair  Assistance upon Discharge: Pt's  is available    Pain/Comfort:  Pain Rating 1: 0/10  Pain Rating Post-Intervention 1: 0/10    Patients cultural, spiritual, Restoration conflicts given the current situation: no    Objective:     Communicated with: RN prior to session.  Patient found sitting edge of bed with peripheral IV, telemetry upon OT entry to room.    General Precautions: Standard, fall  Orthopedic Precautions: N/A  Braces: N/A  Respiratory Status: Room air     Occupational Performance:    Bed Mobility:    Did not assess    Functional Mobility/Transfers:  Patient completed Sit <> Stand Transfer with supervision  with  rolling walker   Functional Mobility:  Pt performed functional mobility in room using RW with supervision (OT " managing IV pole) to address endurance & household distance & community mobility during ADL's & IADL's.    Activities of Daily Living:  Lower Body Dressing: independence doffing and donning socks sitting edge of bed    Cognitive/Visual Perceptual:  Cognitive/Psychosocial Skills:     -       Oriented to: Person, Place, Time, and Situation   -       Follows Commands/attention:Follows multistep  commands  -       Communication: clear/fluent  -       Memory: No Deficits noted  -       Safety awareness/insight to disability: intact   -       Mood/Affect/Coping skills/emotional control: Appropriate to situation    Physical Exam:  Balance: -       good dynamic sitting balance  Dominant hand: -       right  Upper Extremity Range of Motion:     -       Right Upper Extremity: WFL  -       Left Upper Extremity: WFL  Upper Extremity Strength:    -       Right Upper Extremity: WFL  -       Left Upper Extremity: WFL   Strength:    -       Right Upper Extremity: WFL  -       Left Upper Extremity: WFL  Fine Motor Coordination:    -       Intact    AMPAC 6 Click ADL:  AMPAC Total Score: 20    Treatment & Education:  -Education on energy conservation and task modification to maximize safety and (I) during ADLs and mobility  -Education on importance of OOB activity to improve overall activity tolerance and promote recovery  -Pt educated to call for assistance and to transfer with hospital staff only  -Provided education regarding role of OT, POC, & discharge recommendations with pt verbalizing understanding.  Pt had no further questions & when asked whether there were any concerns pt reported none.    Patient left sitting edge of bed with all lines intact and call button in reach    GOALS:   Multidisciplinary Problems       Occupational Therapy Goals          Problem: Occupational Therapy    Goal Priority Disciplines Outcome Interventions   Occupational Therapy Goal     OT, PT/OT Ongoing, Progressing    Description: Pt is  currently functioning at prior level                       History:     Past Medical History:   Diagnosis Date    Abnormal mammogram 08/25/2020    Abnormal mammogram 08/25/2020    Acute blood loss anemia     Acute deep vein thrombosis (DVT) of lower extremity 12/09/2020    Advance care planning 04/30/2021    ODESSA (acute kidney injury) 03/21/2020    Anemia due to chronic blood loss     Anemia due to chronic kidney disease     Anemia due to chronic kidney disease 05/18/2020    Anxiety     Bilateral ureteral obstruction 09/11/2020    Bilious vomiting with nausea 06/11/2023    Cardiovascular event risk -- low 09/14/2015    ASCVD 10-year risk 1.9% (with optimal risk factors 1.3%) as of 9/14/2015     Cervical cancer 2014    Chronic back pain     Colostomy care     Deep vein thrombosis     Depression     Diarrhea due to malabsorption 11/14/2018    Difficult intubation     Discharge planning issues 04/30/2021    Disorder of kidney and ureter     DVT of lower extremity, bilateral 11/04/2020    Edema 04/10/2023    Emphysema of lung 04/10/2023    Fibromyalgia     Fungemia 09/27/2020    Generalized abdominal pain 08/25/2020    GERD (gastroesophageal reflux disease)     Hemifacial spasm 09/16/2015    Hiatal hernia 2014    History of cervical cancer 10/11/2018    History of essential hypertension 05/04/2015    Not requiring medications at this time  BP is low- concern that this may correlate with increased stool output from stoma. Encourage her to contact GI and her PCP.    Hx of psychiatric care     Cymbalta, trazodone    Hypertension     Hypomagnesemia 11/21/2018    Hyponatremia 09/10/2023    Impaired functional mobility, balance, gait, and endurance 07/24/2015    Lactose intolerance     Major depressive disorder, recurrent episode, moderate 06/02/2023    Metastatic squamous cell carcinoma to lymph node 10/11/2018    Moderate episode of recurrent major depressive disorder 04/21/2021    Nephrostomy complication 10/26/2023     Neuropathy due to chemotherapeutic drug 11/14/2018    Osteoarthritis of back     Peritonitis 09/22/2020    Physical deconditioning 04/30/2021    Pseudomonas urinary tract infection 04/21/2021    Psychiatric problem     Pyelitis 09/09/2023    QT prolongation 04/16/2021    Refusal of blood transfusions as patient is [employer]     Schatzki's ring 09/14/2015    Seen on outside EGD 05/2014, underwent esophageal dilatation. Bx were negative.     Seizure-like activity 12/02/2018    Seizures     Sleep stage dysfunction     Noted on PSG 06/2017; negative for obstructive sleep apnea     Stroke     Urinary tract infection associated with nephrostomy catheter 06/11/2020    Wound infection after surgery 09/24/2020         Past Surgical History:   Procedure Laterality Date    ANTEGRADE NEPHROSTOGRAPHY Bilateral 9/28/2020    Procedure: Nephrostogram - antegrade;  Surgeon: Leslie Balbuena MD;  Location: [facility] OR [address] [place] [address];  Service: Urology;  Laterality: Bilateral;    ANTEGRADE NEPHROSTOGRAPHY Left 4/20/2021    Procedure: NEPHROSTOGRAM, ANTEGRADE;  Surgeon: Leslie Balbuena MD;  Location: [facility] OR [address] [place] [address];  Service: Urology;  Laterality: Left;    ANTEGRADE NEPHROSTOGRAPHY Right 10/27/2022    Procedure: NEPHROSTOGRAM, ANTEGRADE;  Surgeon: Chirag Russ MD;  Location: [facility] OR 1ST FLR;  Service: Urology;  Laterality: Right;    ANTEGRADE NEPHROSTOGRAPHY Right 4/21/2023    Procedure: NEPHROSTOGRAM, ANTEGRADE;  Surgeon: Chirag Russ MD;  Location: [facility] OR 2ND FLR;  Service: Urology;  Laterality: Right;    ANTEGRADE NEPHROSTOGRAPHY Left 6/6/2023    Procedure: Nephrostogram - antegrade;  Surgeon: Leslie Balbuena MD;  [facility];  Service: Urology;  Laterality: Left;    BILATERAL OOPHORECTOMY  2015    CHOLECYSTECTOMY  11/09/2016    Done at [facility], path showed chronic cholecystitis and gallstones    cold knife conization  2014    COLECTOMY, RIGHT  9/17/2020    Procedure: COLECTOMY, RIGHT Extended;   Surgeon: Hammad Reynolds MD;  Location: [facility] OR 2ND FLR;  Service: Colon and Rectal;;    COLONOSCOPY  2014    COLONOSCOPY N/A 10/24/2016    at [facility], Dr Gutiérrez    COLONOSCOPY N/A 5/18/2018    Procedure: COLONOSCOPY;  Surgeon: Arden Gutiérrez MD;  Location: [facility] (4TH FLR);  Service: Endoscopy;  Laterality: N/A;    COLONOSCOPY N/A 7/28/2020    Procedure: COLONOSCOPY;  Surgeon: Hammad Reynolds MD;  Location: [facility] (4TH FLR);  Service: Colon and Rectal;  Laterality: N/A;  covid test [place] 7/25    CYSTOSCOPY WITH URETEROSCOPY, RETROGRADE PYELOGRAPHY, AND INSERTION OF STENT Bilateral 3/21/2020    Procedure: CYSTOSCOPY, WITH RETROGRADE PYELOGRAM,;  Surgeon: Leslie Balbuena MD;  Location: [facility] OR 1ST FLR;  Service: Urology;  Laterality: Bilateral;    DILATION OF NEPHROSTOMY TRACT Right 10/27/2022    Procedure: DILATION, NEPHROSTOMY TRACT;  Surgeon: Chirag Russ MD;  Location: [facility] OR 1ST FLR;  Service: Urology;  Laterality: Right;    ESOPHAGOGASTRODUODENOSCOPY  2014    ESOPHAGOGASTRODUODENOSCOPY  11/18/2020    ESOPHAGOGASTRODUODENOSCOPY N/A 11/18/2020    Procedure: ESOPHAGOGASTRODUODENOSCOPY (EGD);  Surgeon: Zenon Spencer MD;  Location: [facility];  Service: Endoscopy;  Laterality: N/A;    ESOPHAGOGASTRODUODENOSCOPY N/A 12/11/2020    Procedure: EGD (ESOPHAGOGASTRODUODENOSCOPY);  Surgeon: Juancho Muse MD;  Location: [facility] (2ND FLR);  Service: Endoscopy;  Laterality: N/A;    HYSTERECTOMY  2014    [facility] for cervical cancer    ILEOSTOMY  9/17/2020    Procedure: CREATION, ILEOSTOMY;  Surgeon: Hammad Reynolds MD;  Location: [facility] OR 2ND FLR;  Service: Colon and Rectal;;    LOOPOGRAM N/A 4/20/2021    Procedure: LOOPOGRAM;  Surgeon: Leslie Balbuena MD;  Location: [facility] OR 1ST FLR;  Service: Urology;  Laterality: N/A;    LOOPOGRAM N/A 6/6/2023    Procedure: LOOPOGRAM;  Surgeon: Leslie Balbuena MD;  [facility]  Service: Urology;  Laterality: N/A;    MOBILIZATION OF SPLENIC FLEXURE  N/A 9/11/2020    Procedure: MOBILIZATION, COLONIC;  Surgeon: Hammad Reynolds MD;  Location: [facility] OR 2ND FLR;  Service: Colon and Rectal;  Laterality: N/A;    NEPHROSTOGRAPHY Bilateral 4/17/2021    Procedure: Nephrostogram;  Surgeon: Celeste Surgeon;  Location: [facility];  Service: Anesthesiology;  Laterality: Bilateral;    OOPHORECTOMY      PERCUTANEOUS NEPHROLITHOTOMY Right 4/21/2023    Procedure: NEPHROLITHOTOMY, PERCUTANEOUS;  Surgeon: Chirag Russ MD;  Location: [facility] OR 2ND FLR;  Service: Urology;  Laterality: Right;  2.5 hrs    PERCUTANEOUS NEPHROSTOMY  4/21/2023    Procedure: CREATION, NEPHROSTOMY, PERCUTANEOUS with removal of existing nephrostomy tube;  Surgeon: Chirag Russ MD;  [facility];  Service: Urology;;    PYELOSCOPY Right 10/27/2022    Procedure: PYELOSCOPY;  Surgeon: Chirag Russ MD;  [facility];  Service: Urology;  Laterality: Right;    REPLACEMENT OF NEPHROSTOMY TUBE Right 10/27/2022    Procedure: REPLACEMENT, NEPHROSTOMY TUBE;  Surgeon: Chirag Russ MD;  [facility];  Service: Urology;  Laterality: Right;    RETROPERITONEAL LYMPHADENECTOMY Right 9/17/2018    Procedure: LYMPHADENECTOMY, RETROPERITONEUM;  Surgeon: Sebas Reed MD;  [facility];  Service: General;  Laterality: Right;  ILIAC    URETEROSCOPIC REMOVAL OF URETERIC CALCULUS Right 10/27/2022    Procedure: REMOVAL, CALCULUS, URETER, URETEROSCOPIC;  Surgeon: Chirag Russ MD;  Location: [facility] OR [facility] FLR;  Service: Urology;  Laterality: Right;    URETEROSCOPY Right 10/27/2022    Procedure: URETEROSCOPY-ANTEGRADE;  Surgeon: Chirag Russ MD;  Location: [facility] OR [facility] FLR;  Service: Urology;  Laterality: Right;       Time Tracking:     OT Date of Treatment:    OT Start Time: 1448  OT Stop Time: 1519  OT Total Time (min): 31 min    Billable Minutes:Evaluation 15  Therapeutic Activity 16    12/12/2023

## 2023-12-12 NOTE — ASSESSMENT & PLAN NOTE
58 year old female with history of cervical CA s/p chemotherapy and XRT c/b bilateral ureteral strictures requiring bilateral nephrostomy tubes (currently left tube remains only; right removed in April), s/p urinary to colon conduit 2020 c/b bowel perforation s/p right hemicolectomy and ostomy and recurrent UTIs who presented to the ED with complaints of weakness, nausea, abdominal pain.     CT reviewed. UA positive. Urine culture from left nephrostomy tube is positive for Klebsiella pneumoniae.  Blood cultures NGTD. Patient is on Cefepime. Afebrile. Leukocytosis resolved. Clinically improved. Urology is following. Flushed NT and changed bag. No urologic intervention planned this admission.     Recommendations  Continue Cefepime while inpatient (today is day 5)  At discharge, can complete antibiotic course with a one time dose of IV Tobramycin as remains highly concentrated in the urine for days  Follow up with Urology as an outpatient as planned  Discussed plan with ID staff. ID will sign off.

## 2023-12-12 NOTE — HPI
57 yo F with complicated medical history including cervical cancer in 2014 (recurrence in 2021) s/p chemo and radiation therapy complicated by bilateral ureteral strictures managed with bilateral stents, s/p transverse colon conduit c/b bowel perforation, now s/p ileostomy, s/p left percutaneous nephrostomy tube due to worsening left ureteral stricture, multiple MDR infections, CKDIIIa, htn, seizure disorder, and hypertension at the age of 7 presenting 12/7 for worsening abdominal pain. She has had recurrent admissions, including 2 admissions for pyelonephritis since August, displaced nephrostomy tube, and multiple ODESSA/MDR UTI's. She found to have MDR Klebsiella UTI, as well as clogged left nephrostomy tube, s/p irrigation with urology. UTI treated with cefepime. Blood cultures have remained NGTD.  Initially admitted with ODESSA with Cr 1.6, which then increased to 1.9. This has improved to baseline Cr of 1.3- 1.4 with IV fluids.     Nephrology has been consulted for acidosis. HCO3 on admission 12. She was started on NaHCO3 650mg TID with subsequent increase to 16. Dose was increased to 1300 TID. HCO3 at time of consult 18. VBG with pH 7.19, pCO2 58.5. Anion gap ~11.5. She reports that she has had decreased oral intake over the last several days to weeks, as well as increased ostomy output over the last several weeks. She has a history of mild emphysema noted on CT in 2020, however all recent imaging notable for healthy lungs.

## 2023-12-12 NOTE — ASSESSMENT & PLAN NOTE
Bilateral hydronephrosis noted on abdominal CT 12/9. R hydronephrosis appears to be consistent, stable.  S/p irrigation of L nephrostomy tube per urology 12/11 with 200cc urine with mucus return.  Continue to monitor

## 2023-12-12 NOTE — CONSULTS
[facility]  Nephrology  Consult Note    Patient Name: Edita Riley  MRN: [id number]  Admission Date: 12/7/2023  Hospital Length of Stay: 4 days  Attending Provider: Jamar Campbell MD   Primary Care Physician: Trina Vu MD  Principal Problem:Pyelonephritis    Inpatient consult to Nephrology  Consult performed by: Zee Rivera DO  Consult ordered by: Jamar Campbell MD        Subjective:     HPI: 59 yo F with complicated medical history including cervical cancer in 2014 (recurrence in 2021) s/p chemo and radiation therapy complicated by bilateral ureteral strictures managed with bilateral stents, s/p transverse colon conduit c/b bowel perforation, now s/p ileostomy, s/p left percutaneous nephrostomy tube due to worsening left ureteral stricture, multiple MDR infections, CKDIIIa, htn, seizure disorder, and hypertension at the age of 7 presenting 12/7 for worsening abdominal pain. She has had recurrent admissions, including 2 admissions for pyelonephritis since August, displaced nephrostomy tube, and multiple ODESSA/MDR UTI's. She found to have MDR Klebsiella UTI, as well as clogged left nephrostomy tube, s/p irrigation with urology. UTI treated with cefepime. Blood cultures have remained NGTD.  Initially admitted with ODESSA with Cr 1.6, which then increased to 1.9. This has improved to baseline Cr of 1.3- 1.4 with IV fluids.     Nephrology has been consulted for acidosis. HCO3 on admission 12. She was started on NaHCO3 650mg TID with subsequent increase to 16. Dose was increased to 1300 TID. HCO3 at time of consult 18. VBG with pH 7.19, pCO2 58.5. Anion gap ~11.5. She reports that she has had decreased oral intake over the last several days to weeks, as well as increased ostomy output over the last several weeks. She has a history of mild emphysema noted on CT in 2020, however all recent imaging notable for healthy lungs.    Past Medical History:   Diagnosis Date    Abnormal mammogram  08/25/2020    Abnormal mammogram 08/25/2020    Acute blood loss anemia     Acute deep vein thrombosis (DVT) of lower extremity 12/09/2020    Advance care planning 04/30/2021    ODESSA (acute kidney injury) 03/21/2020    Anemia due to chronic blood loss     Anemia due to chronic kidney disease     Anemia due to chronic kidney disease 05/18/2020    Anxiety     Bilateral ureteral obstruction 09/11/2020    Bilious vomiting with nausea 06/11/2023    Cardiovascular event risk -- low 09/14/2015    ASCVD 10-year risk 1.9% (with optimal risk factors 1.3%) as of 9/14/2015     Cervical cancer 2014    Chronic back pain     Colostomy care     Deep vein thrombosis     Depression     Diarrhea due to malabsorption 11/14/2018    Difficult intubation     Discharge planning issues 04/30/2021    Disorder of kidney and ureter     DVT of lower extremity, bilateral 11/04/2020    Edema 04/10/2023    Emphysema of lung 04/10/2023    Fibromyalgia     Fungemia 09/27/2020    Generalized abdominal pain 08/25/2020    GERD (gastroesophageal reflux disease)     Hemifacial spasm 09/16/2015    Hiatal hernia 2014    History of cervical cancer 10/11/2018    History of essential hypertension 05/04/2015    Not requiring medications at this time  BP is low- concern that this may correlate with increased stool output from stoma. Encourage her to contact GI and her PCP.    Hx of psychiatric care     Cymbalta, trazodone    Hypertension     Hypomagnesemia 11/21/2018    Hyponatremia 09/10/2023    Impaired functional mobility, balance, gait, and endurance 07/24/2015    Lactose intolerance     Major depressive disorder, recurrent episode, moderate 06/02/2023    Metastatic squamous cell carcinoma to lymph node 10/11/2018    Moderate episode of recurrent major depressive disorder 04/21/2021    Nephrostomy complication 10/26/2023    Neuropathy due to chemotherapeutic drug 11/14/2018    Osteoarthritis of back     Peritonitis 09/22/2020    Physical deconditioning  04/30/2021    Pseudomonas urinary tract infection 04/21/2021    Psychiatric problem     Pyelitis 09/09/2023    QT prolongation 04/16/2021    Refusal of blood transfusions as patient is [employer]     Schatzki's ring 09/14/2015    Seen on outside EGD 05/2014, underwent esophageal dilatation. Bx were negative.     Seizure-like activity 12/02/2018    Seizures     Sleep stage dysfunction     Noted on PSG 06/2017; negative for obstructive sleep apnea     Stroke     Urinary tract infection associated with nephrostomy catheter 06/11/2020    Wound infection after surgery 09/24/2020       Past Surgical History:   Procedure Laterality Date    ANTEGRADE NEPHROSTOGRAPHY Bilateral 9/28/2020    Procedure: Nephrostogram - antegrade;  Surgeon: Leslie Balbuena MD;  Location: [facility] OR [address] [place] [address];  Service: Urology;  Laterality: Bilateral;    ANTEGRADE NEPHROSTOGRAPHY Left 4/20/2021    Procedure: NEPHROSTOGRAM, ANTEGRADE;  Surgeon: Leslie Balbuena MD;  [facility];  Service: Urology;  Laterality: Left;    ANTEGRADE NEPHROSTOGRAPHY Right 10/27/2022    Procedure: NEPHROSTOGRAM, ANTEGRADE;  Surgeon: Chirag Russ MD;  [facility];  Service: Urology;  Laterality: Right;    ANTEGRADE NEPHROSTOGRAPHY Right 4/21/2023    Procedure: NEPHROSTOGRAM, ANTEGRADE;  Surgeon: Chirag Russ MD;  Location: [facility] OR 2ND FLR;  Service: Urology;  Laterality: Right;    ANTEGRADE NEPHROSTOGRAPHY Left 6/6/2023    Procedure: Nephrostogram - antegrade;  Surgeon: Leslie Balbuena MD;  [facility];  Service: Urology;  Laterality: Left;    BILATERAL OOPHORECTOMY  2015    CHOLECYSTECTOMY  11/09/2016    Done at [facility], path showed chronic cholecystitis and gallstones    cold knife conization  2014    COLECTOMY, RIGHT  9/17/2020    Procedure: COLECTOMY, RIGHT Extended;  Surgeon: Hammad Reynolds MD;  Location: [facility] OR 2ND FLR;  Service: Colon and Rectal;;    COLONOSCOPY  2014    COLONOSCOPY N/A 10/24/2016     at [facility], Dr Gutiérrez    COLONOSCOPY N/A 5/18/2018    Procedure: COLONOSCOPY;  Surgeon: Arden Gutiérrez MD;  Location: [facility] (4TH FLR);  Service: Endoscopy;  Laterality: N/A;    COLONOSCOPY N/A 7/28/2020    Procedure: COLONOSCOPY;  Surgeon: Hammad Reynolds MD;  Location: [facility] ENDO (4TH FLR);  Service: Colon and Rectal;  Laterality: N/A;  covid test [place] 7/25    CYSTOSCOPY WITH URETEROSCOPY, RETROGRADE PYELOGRAPHY, AND INSERTION OF STENT Bilateral 3/21/2020    Procedure: CYSTOSCOPY, WITH RETROGRADE PYELOGRAM,;  Surgeon: Leslie Balbuena MD;  Location: [facility] OR 1ST FLR;  Service: Urology;  Laterality: Bilateral;    DILATION OF NEPHROSTOMY TRACT Right 10/27/2022    Procedure: DILATION, NEPHROSTOMY TRACT;  Surgeon: Chirag Russ MD;  Location: [facility] OR 1ST FLR;  Service: Urology;  Laterality: Right;    ESOPHAGOGASTRODUODENOSCOPY  2014    ESOPHAGOGASTRODUODENOSCOPY  11/18/2020    ESOPHAGOGASTRODUODENOSCOPY N/A 11/18/2020    Procedure: ESOPHAGOGASTRODUODENOSCOPY (EGD);  Surgeon: Zenon Spencer MD;  Location: [facility];  Service: Endoscopy;  Laterality: N/A;    ESOPHAGOGASTRODUODENOSCOPY N/A 12/11/2020    Procedure: EGD (ESOPHAGOGASTRODUODENOSCOPY);  Surgeon: Juancho Muse MD;  Location: [facility] (2ND FLR);  Service: Endoscopy;  Laterality: N/A;    HYSTERECTOMY  2014    [facility] for cervical cancer    ILEOSTOMY  9/17/2020    Procedure: CREATION, ILEOSTOMY;  Surgeon: Hammad Reynolds MD;  Location: [facility] OR 2ND FLR;  Service: Colon and Rectal;;    LOOPOGRAM N/A 4/20/2021    Procedure: LOOPOGRAM;  Surgeon: Leslie Balbuena MD;  Location: [facility] OR 1ST FLR;  Service: Urology;  Laterality: N/A;    LOOPOGRAM N/A 6/6/2023    Procedure: LOOPOGRAM;  Surgeon: Leslie Balbuena MD;  Location: [facility] OR 1ST FLR;  Service: Urology;  Laterality: N/A;    MOBILIZATION OF SPLENIC FLEXURE N/A 9/11/2020    Procedure: MOBILIZATION, COLONIC;  Surgeon: Hammad Reynolds MD;  Location: [facility] OR 2ND FLR;  Service: Colon and Rectal;   Laterality: N/A;    NEPHROSTOGRAPHY Bilateral 4/17/2021    Procedure: Nephrostogram;  Surgeon: Celeste Surgeon;  Location: [facility];  Service: Anesthesiology;  Laterality: Bilateral;    OOPHORECTOMY      PERCUTANEOUS NEPHROLITHOTOMY Right 4/21/2023    Procedure: NEPHROLITHOTOMY, PERCUTANEOUS;  Surgeon: Chirag Russ MD;  Location: [facility] OR 2ND FLR;  Service: Urology;  Laterality: Right;  2.5 hrs    PERCUTANEOUS NEPHROSTOMY  4/21/2023    Procedure: CREATION, NEPHROSTOMY, PERCUTANEOUS with removal of existing nephrostomy tube;  Surgeon: Chirag Russ MD;  Location: [facility] OR [facility] FLR;  Service: Urology;;    PYELOSCOPY Right 10/27/2022    Procedure: PYELOSCOPY;  Surgeon: Chirag Russ MD;  [facility];  Service: Urology;  Laterality: Right;    REPLACEMENT OF NEPHROSTOMY TUBE Right 10/27/2022    Procedure: REPLACEMENT, NEPHROSTOMY TUBE;  Surgeon: Chirag Russ MD;  [facility];  Service: Urology;  Laterality: Right;    RETROPERITONEAL LYMPHADENECTOMY Right 9/17/2018    Procedure: LYMPHADENECTOMY, RETROPERITONEUM;  Surgeon: Sebas Reed MD;  [facility];  Service: General;  Laterality: Right;  ILIAC    URETEROSCOPIC REMOVAL OF URETERIC CALCULUS Right 10/27/2022    Procedure: REMOVAL, CALCULUS, URETER, URETEROSCOPIC;  Surgeon: Chirag Russ MD;  Location: [facility] OR [address] [place] [address];  Service: Urology;  Laterality: Right;    URETEROSCOPY Right 10/27/2022    Procedure: URETEROSCOPY-ANTEGRADE;  Surgeon: Chirag Russ MD;  [facility];  Service: Urology;  Laterality: Right;       Review of patient's allergies indicates:   Allergen Reactions    Bee sting [allergen ext-venom-honey bee]      Rash      Grass pollen-bermuda, standard      rash     Current Facility-Administered Medications   Medication Frequency    acetaminophen tablet 650 mg Q4H PRN    ceFEPIme (MAXIPIME) 1 g in dextrose 5 % in water (D5W) 100 mL IVPB (MB+) Q12H    cetirizine tablet 10 mg  Daily PRN    enoxaparin injection 40 mg Daily    HYDROmorphone injection 1 mg Once    HYDROmorphone injection 1 mg Q4H PRN    melatonin tablet 6 mg Nightly PRN    miconazole NITRATE 2 % top powder BID    mirtazapine tablet 30 mg Nightly    naloxone 0.4 mg/mL injection 0.02 mg PRN    ondansetron injection 4 mg Q8H PRN    polyethylene glycol packet 17 g BID PRN    prochlorperazine injection Soln 5 mg Q6H PRN    sodium bicarbonate tablet 1,300 mg TID    sodium chloride 0.9% flush 10 mL Q6H PRN     Family History       Problem Relation (Age of Onset)    Breast cancer Maternal Aunt    Cancer Father, Mother    Cervical cancer Mother    Colon cancer Father    Drug abuse Daughter, Daughter    Esophageal cancer Father    Heart disease Sister, Maternal Grandmother    Suicide Daughter          Tobacco Use    Smoking status: Never    Smokeless tobacco: Never   Substance and Sexual Activity    Alcohol use: No     Alcohol/week: 0.0 standard drinks of alcohol    Drug use: No    Sexual activity: Yes     Partners: Male     Birth control/protection: None     Comment:  19 years since 1999     Review of Systems   Constitutional:  Positive for appetite change and fatigue. Negative for fever.   HENT:  Negative for trouble swallowing.    Respiratory:  Negative for cough, chest tightness and shortness of breath.    Cardiovascular:  Negative for chest pain, palpitations and leg swelling.   Gastrointestinal:  Positive for abdominal pain and diarrhea (increased ostomy output, loose). Negative for abdominal distention, constipation, nausea and vomiting.   Genitourinary:  Positive for decreased urine volume.        Does not urinate through urethra, all urine goes to left nephrostomy bag   Skin:  Negative for color change and rash.   Neurological:  Positive for weakness. Negative for dizziness, syncope, light-headedness and headaches.   Psychiatric/Behavioral:  Negative for confusion.      Objective:     Vital Signs (Most Recent):  Temp:  97.9 °F (36.6 °C) (12/12/23 1619)  Pulse: 74 (12/12/23 1619)  Resp: 16 (12/12/23 1619)  BP: (!) 106/55 (12/12/23 1619)  SpO2: 100 % (12/12/23 1619) Vital Signs (24h Range):  Temp:  [97.2 °F (36.2 °C)-98.7 °F (37.1 °C)] 97.9 °F (36.6 °C)  Pulse:  [68-78] 74  Resp:  [16-20] 16  SpO2:  [96 %-100 %] 100 %  BP: ()/(53-61) 106/55     Weight: 81 kg (178 lb 9.2 oz) (12/11/23 0602)  Body mass index is 27.97 kg/m².  Body surface area is 1.96 meters squared.    I/O last 3 completed shifts:  In: 220 [P.O.:120; IV Piggyback:100]  Out: 3010 [Urine:1335; Stool:1675]     Physical Exam  Vitals and nursing note reviewed.   Constitutional:       General: She is not in acute distress.     Appearance: Normal appearance. She is not ill-appearing.      Comments: Sitting upright at the edge of her bed eating lunch, comfortable and NAD   HENT:      Head: Normocephalic and atraumatic.   Cardiovascular:      Rate and Rhythm: Normal rate and regular rhythm.      Heart sounds: Normal heart sounds. No murmur heard.  Pulmonary:      Effort: Pulmonary effort is normal. No respiratory distress.      Breath sounds: Normal breath sounds. No wheezing or rales.   Abdominal:      General: Abdomen is flat.      Palpations: Abdomen is soft.      Comments: Ostomy present on right side of abdomen with watery, yellow/brown output   Genitourinary:     Comments: Left nephrostomy tube in place, draining clear, yellow urine  Musculoskeletal:      Cervical back: Normal range of motion. No rigidity.      Right lower leg: No edema.      Left lower leg: No edema.   Skin:     General: Skin is warm and dry.      Coloration: Skin is not jaundiced.   Neurological:      General: No focal deficit present.      Mental Status: She is alert. Mental status is at baseline.   Psychiatric:         Mood and Affect: Mood normal.         Behavior: Behavior normal.          Significant Labs:  All labs within the past 24 hours have been reviewed.    Significant  Imaging:  Reviewed  Assessment/Plan:     Renal/  Metabolic acidosis, NAG, bicarbonate losses  59 yo F with complicated ureteral medical history, now s/p left nephrostomy tube placement (as well as right, removed in 2021) and ileostomy complicated by recurrent MDR infections, CKDIIIa, and htn admitted 12/7 MDR Klebsiella UTI, as well as clogged left nephrostomy tube, s/p irrigation with urology. UTI treated with cefepime. Blood cultures have remained NGTD.    - Initially admitted with ODESSA with Cr 1.6, which then increased to 1.9. This has improved to baseline Cr of 1.3- 1.4 with IV fluids.     Nephrology has been consulted for acidosis. HCO3 on admission 12. She was started on NaHCO3 650mg TID with subsequent increase to 16. Dose was increased to 1300 TID. HCO3 at time of consult 18. Per chart review, her recent baseline HCO3 over the last several months has been 18-20.   --- VBG with pH 7.19, pCO2 58.5. Anion gap ~11.5. This is consistent with primary respiratory acidosis as well as concomitant non anion gap metabolic acidosis. The metabolic acidosis is currently at baseline. Likely due to GI losses, high output ileostomy state contributes to significant HCO3 loss. Reason for respiratory acidosis unclear. She has a history of emphysema noted on CT in 2020, however recent imaging is without emphysematous changes. No prior PFTs. She has had a prior polysomnography that was negative for JENN. This appears to be her first elevated CO2    Recommendations:  - Can give isotonic bicarb (150mEq) at 100 ml/hr x 10 hours for total 1L administration to assist with the acidosis as well as fluid resuscitation. If she could still benefit from resuscitation following completion, can change to LR  - Consider addition of octreotide to decrease GI secretions  - Would repeat VBG. If CO2 still elevated, would consider pulmonology consult for evaluation of respiratory acidosis    CKD (chronic kidney disease), stage III  Recent baseline  GFR ~47.   - Trend daily Cr  - Renally dose medications   - Avoid contrast where possible    Hydronephrosis  Bilateral hydronephrosis noted on abdominal CT 12/9. R hydronephrosis appears to be consistent, stable.  S/p irrigation of L nephrostomy tube per urology 12/11 with 200cc urine with mucus return.  Continue to monitor    ID  * Pyelonephritis  Per primary        Thank you for your consult. I will follow-up with patient. Please contact us if you have any additional questions.    Zee Rivera, DO  Nephrology  [facility]

## 2023-12-12 NOTE — SUBJECTIVE & OBJECTIVE
Interval History:   No AEON.  Afebrile and WBC WNL.  Feeling better today. Still with poor PO intake and asking for boost.   Left PCNT irrigated and bag replaced by Urology team.   Urine cx + kleb pna.       Review of Systems   Constitutional:  Positive for appetite change (poor). Negative for activity change, chills, diaphoresis and fever.   Respiratory:  Negative for cough, shortness of breath and wheezing.    Cardiovascular:  Negative for chest pain.   Gastrointestinal:  Negative for abdominal pain, constipation, diarrhea, nausea and vomiting.   Genitourinary:  Negative for dysuria, frequency and urgency.   Neurological:  Negative for dizziness.   Hematological:  Does not bruise/bleed easily.     Objective:     Vital Signs (Most Recent):  Temp: 97.5 °F (36.4 °C) (12/12/23 1228)  Pulse: 72 (12/12/23 1228)  Resp: 16 (12/12/23 1228)  BP: (!) 108/53 (12/12/23 1228)  SpO2: 100 % (12/12/23 1228) Vital Signs (24h Range):  Temp:  [97.2 °F (36.2 °C)-98.7 °F (37.1 °C)] 97.5 °F (36.4 °C)  Pulse:  [68-81] 72  Resp:  [16-20] 16  SpO2:  [96 %-100 %] 100 %  BP: ()/(53-61) 108/53     Weight: 81 kg (178 lb 9.2 oz)  Body mass index is 27.97 kg/m².    Estimated Creatinine Clearance: 46.8 mL/min (based on SCr of 1.4 mg/dL).     Physical Exam  Constitutional:       General: She is not in acute distress.     Appearance: Normal appearance. She is well-developed. She is not ill-appearing, toxic-appearing or diaphoretic.   HENT:      Head: Normocephalic and atraumatic.   Cardiovascular:      Rate and Rhythm: Normal rate and regular rhythm.   Pulmonary:      Effort: Pulmonary effort is normal. No respiratory distress.      Breath sounds: Normal breath sounds. No wheezing or rales.   Abdominal:      General: There is no distension.      Palpations: Abdomen is soft.      Tenderness: There is no abdominal tenderness. There is no guarding.      Comments: RLQ ostomy with liquid stool  LLQ ostomy with clear urine   L NT site without  surrounding cellulitis   Skin:     General: Skin is warm and dry.   Neurological:      Mental Status: She is alert and oriented to person, place, and time.   Psychiatric:         Mood and Affect: Mood normal.         Behavior: Behavior normal.         Thought Content: Thought content normal.          Significant Labs: Blood Culture:   Recent Labs   Lab 08/18/23 2043 09/09/23  1806 09/09/23  1807 12/07/23 1953 12/08/23  0155   LABBLOO No growth after 5 days.  No growth after 5 days. No growth after 5 days. No growth after 5 days. No Growth to date  No Growth to date  No Growth to date  No Growth to date  No Growth to date  No Growth to date  No Growth to date  No Growth to date  No Growth to date  No Growth to date No Growth to date  No Growth to date  No Growth to date  No Growth to date  No Growth to date  No Growth to date  No Growth to date  No Growth to date  No Growth to date  No Growth to date       CBC:   Recent Labs   Lab 12/11/23  0828 12/12/23  0500 12/12/23  1333   WBC 5.75 6.64  --    HGB 10.9* 11.4*  --    HCT 34.7* 35.8* 43    303  --        CMP:   Recent Labs   Lab 12/11/23  0828 12/12/23  0500    139   K 3.6 3.6   * 112*   CO2 18* 18*   GLU 92 99   BUN 18 18   CREATININE 1.3 1.4   CALCIUM 9.2 9.3   ANIONGAP 8 9       Urine Culture:   Recent Labs   Lab 09/09/23  1416 09/11/23  1226 10/21/23  2018 11/03/23  2119 12/07/23  2131   LABURIN ENTEROBACTER CLOACAE  >100,000 cfu/ml  *  ACINETOBACTER BAUMANNII/HAEMOLYTICUS  50,000 - 99,999 cfu/ml  * ENTEROCOCCUS FAECIUM VRE  10,000 - 49,999 cfu/ml  No other significant isolate  * ENTEROCOCCUS FAECALIS  >100,000 cfu/ml  No other significant isolate  * ESCHERICHIA COLI  >100,000 cfu/ml  * KLEBSIELLA PNEUMONIAE  >100,000 cfu/ml  *       Urine Studies:   Recent Labs   Lab 12/07/23 2131   COLORU [place]   APPEARANCEUA Cloudy*   PHUR 7.0   SPECGRAV 1.015   PROTEINUA 2+*   GLUCUA Negative   KETONESU Negative   BILIRUBINUA  Negative   OCCULTUA 1+*   NITRITE Negative   LEUKOCYTESUR 3+*   RBCUA 43*   WBCUA >100*   BACTERIA Many*   SQUAMEPITHEL 4   HYALINECASTS 0       All pertinent labs within the past 24 hours have been reviewed.    Significant Imaging: I have reviewed all pertinent imaging results/findings within the past 24 hours.  Procedure Component Value Units Date/Time   CT Abdomen Pelvis Without Contrast [[id number]] Resulted: 12/09/23 1516   Order Status: Completed Updated: 12/09/23 1519   Narrative:     EXAMINATION:  CT ABDOMEN PELVIS WITHOUT CONTRAST    CLINICAL HISTORY:  Pyelonephritis ;    TECHNIQUE:  Low dose axial images, sagittal and coronal reformations were obtained from the lung bases to the pubic symphysis.  Oral contrast was not administered.    COMPARISON:  11/03/2023.    FINDINGS:  There are linear opacities within the lung bases consistent with linear atelectasis.  No pleural effusions are identified.  Bony structures appear intact.  There is no evidence for acute fracture or bone destruction.  The liver appears mildly enlarged.  The liver is homogeneous in density with no focal liver lesions identified.  The gallbladder is absent.  The spleen, stomach, and pancreas all appear unremarkable.  The adrenal glands are not enlarged.  There is a left-sided nephrostomy tube present.  There is bilateral hydronephrosis present.  There is a small air bubble identified within the left renal collecting system.  No renal or ureteral calculi are appreciated.  There are surgical changes related to patient's prior creation of transverse colon conduit for urinary diversion.  The abdominal aorta tapers normally without aneurysmal dilatation.  No para-aortic lymphadenopathy is identified.  The patient has bilateral ostomies.  No dilated loops of bowel are evident.  Patient is status post hysterectomy.  No abnormal adnexal masses are appreciated.  The urinary bladder is decompressed.  There is no evidence for pelvic or inguinal  lymphadenopathy.   Impression:       Surgical changes with bilateral abdominal ostomies present as before.    Bilateral hydronephrosis and proximal hydroureter despite the presence of a left sided nephrostomy tube which appears in satisfactory position.  Note the patient has a history of bilateral ureteral obstruction with creation of transverse colon conduit.    Mild hepatomegaly.    S/p hysterectomy.  Status post cholecystectomy.      Electronically signed by: Oj Del Rio MD  Date: 12/09/2023  Time: 15:16   US Retroperitoneal Complete [[id number]] Resulted: 12/09/23 1053   Order Status: Completed Updated: 12/09/23 1055   Narrative:     EXAMINATION:  US RETROPERITONEAL COMPLETE    CLINICAL HISTORY:  ODESSA;    TECHNIQUE:  Ultrasound of the kidneys and urinary bladder was performed including color flow and Doppler evaluation of the kidneys.    COMPARISON:  CT abdomen pelvis 11/03/2023.  Retroperitoneal ultrasound 06/01/2023.    FINDINGS:  Right kidney: The right kidney measures 12.3 cm. No cortical thinning. No loss of corticomedullary distinction. Resistive index measures 0.78.  No mass. No renal stone. Moderate hydronephrosis.    Left kidney: The left kidney measures 12.6 cm.   No cortical thinning. No loss of corticomedullary distinction. Resistive index measures 0.79.  No mass. No renal stone.Left nephrostomy tube.  Minimal residual caliectasis, without overt hydronephrosis.    Reported urostomy.  The bladder is not well visualized.    Splenic resistive index measures 0.56.   Impression:       Moderate right hydronephrosis, similar to prior.    Left nephrostomy.  No overt left hydronephrosis.    Electronically signed by resident: John Juarez  Date: 12/09/2023  Time: 10:14    Electronically signed by: Diomedes Chung Jr  Date: 12/09/2023  Time: 10:53     Imaging History    2023    Date Procedure Name Study Review Link PACS Link Status Accession Number Location   12/09/23 11:20 AM CT Abdomen Pelvis  Without  Contrast Study Review  Images Final [id number] [facility]   12/09/23 09:46 AM US Retroperitoneal Complete Study Review  Images Final [id number] [facility]   12/09/23 02:14 PM CARDIAC MONITORING STRIPS Study Review  Final

## 2023-12-12 NOTE — HOSPITAL COURSE
ID consulted for concerns for pyelonephritis. Urine cx grew Klebsiella. Received empiric cefepime x5 days followed by tobramycin IV once dose on 12/13.  Hosp course complicated by ODESSA. Resolved with IVF. Cr at baseline now.  Retroperitoneal USG with b/l hydronephrosis. Urology consulted. Left nephrostomy tube was found to be clogged, and was irrigated is now draining urine freely.  No indications for urologic interventions at this time. Recommend continued PCNT exchanges with IR q6-8 weeks. Urology plans to coordinate schedules with CRS for joint anastomotic revision surgery in the near future.Consulted CRS. Rec OP follow up for surgical planning. Ostomy care RN consulted. Consulted PT/OT, for ambulation while in hospital, rec low intensity exercise. Dietary consulted. Added boost shakes TID to optimize albumin prior to surgery. For metabolic acidosis. Increased home sodium bicarb from 600 TID to 1300 TID w/o improvement. Consulted nephro. Patient received 1L isotonic bicarb with improvement in serum bicarb. Per nephro okay to d/c with sodium bicarb 1300 BID.     Patient is currently medically and HDS. She is being d/c home. FU with PCP, nephro, urology, CRS and ID as OP. ER precautions given. Plan of care discussed with patient, verbalized understanding. All questions were answered.

## 2023-12-12 NOTE — ASSESSMENT & PLAN NOTE
Patient with hx of MDRO and VRE and recent bouts pyelonephritis, with similar symptoms, epigastric tenderness, leukocytosis, pyuria  ID Consulted. Continue cefepime.  Urine cx growing GNR, final results pending   Blood cx with NGTD  CT A/P w/o contrast with stable hydronephrosis BL but no mention perinephric fat stranding   Per ID, will need L PCNT exchanged once cultures known and abx tailored   Consulted urology. L PCNT found to be clogged with mucous following flushing and change of agnieszka bag. Urine draining well now  No indications for urologic interventions at this time  Nursing to flush nephrostomy tube PRN  Recommend continued PCNT exchanges with IR q6-8weeks  Urology plans to coordinate schedules with CRS for joint anastomotic revision surgery in the near future  Consulted CRS. Rec OP follow up for surgical planning. Rec ostomy care consult  Recommend PT/OT, ambulation while in hospital  Recommend dietary consult and boost shakes TID to optimize albumin prior to surgery

## 2023-12-13 VITALS
TEMPERATURE: 98 F | HEIGHT: 67 IN | BODY MASS INDEX: 28.02 KG/M2 | HEART RATE: 69 BPM | DIASTOLIC BLOOD PRESSURE: 49 MMHG | WEIGHT: 178.56 LBS | OXYGEN SATURATION: 100 % | SYSTOLIC BLOOD PRESSURE: 105 MMHG | RESPIRATION RATE: 18 BRPM

## 2023-12-13 LAB
ALLENS TEST: ABNORMAL
ANION GAP SERPL CALC-SCNC: 8 MMOL/L (ref 8–16)
BACTERIA BLD CULT: NORMAL
BACTERIA BLD CULT: NORMAL
BASOPHILS # BLD AUTO: 0.03 K/UL (ref 0–0.2)
BASOPHILS NFR BLD: 0.4 % (ref 0–1.9)
BUN SERPL-MCNC: 20 MG/DL (ref 6–20)
CALCIUM SERPL-MCNC: 8.7 MG/DL (ref 8.7–10.5)
CHLORIDE SERPL-SCNC: 108 MMOL/L (ref 95–110)
CO2 SERPL-SCNC: 22 MMOL/L (ref 23–29)
CREAT SERPL-MCNC: 1.2 MG/DL (ref 0.5–1.4)
DELSYS: ABNORMAL
DIFFERENTIAL METHOD: ABNORMAL
EOSINOPHIL # BLD AUTO: 0.2 K/UL (ref 0–0.5)
EOSINOPHIL NFR BLD: 3.2 % (ref 0–8)
ERYTHROCYTE [DISTWIDTH] IN BLOOD BY AUTOMATED COUNT: 14.5 % (ref 11.5–14.5)
EST. GFR  (NO RACE VARIABLE): 51.8 ML/MIN/1.73 M^2
FIO2: 21
GLUCOSE SERPL-MCNC: 105 MG/DL (ref 70–110)
HCO3 UR-SCNC: 26.8 MMOL/L (ref 24–28)
HCT VFR BLD AUTO: 34.6 % (ref 37–48.5)
HGB BLD-MCNC: 11 G/DL (ref 12–16)
IMM GRANULOCYTES # BLD AUTO: 0.02 K/UL (ref 0–0.04)
IMM GRANULOCYTES NFR BLD AUTO: 0.3 % (ref 0–0.5)
LYMPHOCYTES # BLD AUTO: 1.5 K/UL (ref 1–4.8)
LYMPHOCYTES NFR BLD: 21.3 % (ref 18–48)
MCH RBC QN AUTO: 28.7 PG (ref 27–31)
MCHC RBC AUTO-ENTMCNC: 31.8 G/DL (ref 32–36)
MCV RBC AUTO: 90 FL (ref 82–98)
MODE: ABNORMAL
MONOCYTES # BLD AUTO: 0.8 K/UL (ref 0.3–1)
MONOCYTES NFR BLD: 11.9 % (ref 4–15)
NEUTROPHILS # BLD AUTO: 4.4 K/UL (ref 1.8–7.7)
NEUTROPHILS NFR BLD: 62.9 % (ref 38–73)
NRBC BLD-RTO: 0 /100 WBC
PCO2 BLDA: 55.9 MMHG (ref 35–45)
PH SMN: 7.29 [PH] (ref 7.35–7.45)
PLATELET # BLD AUTO: 281 K/UL (ref 150–450)
PMV BLD AUTO: 10.2 FL (ref 9.2–12.9)
PO2 BLDA: 31 MMHG (ref 40–60)
POC BE: 0 MMOL/L
POC SATURATED O2: 52 % (ref 95–100)
POC TCO2: 28 MMOL/L (ref 24–29)
POTASSIUM SERPL-SCNC: 3.4 MMOL/L (ref 3.5–5.1)
RBC # BLD AUTO: 3.83 M/UL (ref 4–5.4)
SAMPLE: ABNORMAL
SITE: ABNORMAL
SODIUM SERPL-SCNC: 138 MMOL/L (ref 136–145)
WBC # BLD AUTO: 6.96 K/UL (ref 3.9–12.7)

## 2023-12-13 PROCEDURE — 99900035 HC TECH TIME PER 15 MIN (STAT)

## 2023-12-13 PROCEDURE — 25000003 PHARM REV CODE 250: Performed by: HOSPITALIST

## 2023-12-13 PROCEDURE — 80048 BASIC METABOLIC PNL TOTAL CA: CPT | Performed by: STUDENT IN AN ORGANIZED HEALTH CARE EDUCATION/TRAINING PROGRAM

## 2023-12-13 PROCEDURE — 82803 BLOOD GASES ANY COMBINATION: CPT

## 2023-12-13 PROCEDURE — 99232 PR SUBSEQUENT HOSPITAL CARE,LEVL II: ICD-10-PCS | Mod: ,,, | Performed by: HOSPITALIST

## 2023-12-13 PROCEDURE — 25000003 PHARM REV CODE 250: Performed by: STUDENT IN AN ORGANIZED HEALTH CARE EDUCATION/TRAINING PROGRAM

## 2023-12-13 PROCEDURE — 85025 COMPLETE CBC W/AUTO DIFF WBC: CPT | Performed by: STUDENT IN AN ORGANIZED HEALTH CARE EDUCATION/TRAINING PROGRAM

## 2023-12-13 PROCEDURE — 36415 COLL VENOUS BLD VENIPUNCTURE: CPT | Performed by: STUDENT IN AN ORGANIZED HEALTH CARE EDUCATION/TRAINING PROGRAM

## 2023-12-13 PROCEDURE — 99232 SBSQ HOSP IP/OBS MODERATE 35: CPT | Mod: ,,, | Performed by: HOSPITALIST

## 2023-12-13 PROCEDURE — 63600175 PHARM REV CODE 636 W HCPCS: Performed by: HOSPITALIST

## 2023-12-13 PROCEDURE — 63600175 PHARM REV CODE 636 W HCPCS: Performed by: STUDENT IN AN ORGANIZED HEALTH CARE EDUCATION/TRAINING PROGRAM

## 2023-12-13 PROCEDURE — 97116 GAIT TRAINING THERAPY: CPT

## 2023-12-13 RX ORDER — POTASSIUM CHLORIDE 20 MEQ/1
40 TABLET, EXTENDED RELEASE ORAL ONCE
Status: COMPLETED | OUTPATIENT
Start: 2023-12-13 | End: 2023-12-13

## 2023-12-13 RX ORDER — SODIUM BICARBONATE 650 MG/1
1300 TABLET ORAL 2 TIMES DAILY
Qty: 60 TABLET | Refills: 0
Start: 2023-12-13 | End: 2023-12-13 | Stop reason: SDUPTHER

## 2023-12-13 RX ORDER — SODIUM BICARBONATE 650 MG/1
1300 TABLET ORAL 3 TIMES DAILY
Qty: 60 TABLET | Refills: 0
Start: 2023-12-13 | End: 2023-12-13 | Stop reason: SDUPTHER

## 2023-12-13 RX ORDER — SODIUM BICARBONATE 650 MG/1
1300 TABLET ORAL 2 TIMES DAILY
Qty: 60 TABLET | Refills: 0 | Status: SHIPPED | OUTPATIENT
Start: 2023-12-13 | End: 2024-03-15

## 2023-12-13 RX ADMIN — MICONAZOLE NITRATE: 20 POWDER TOPICAL at 08:12

## 2023-12-13 RX ADMIN — HYDROMORPHONE HYDROCHLORIDE 1 MG: 1 INJECTION, SOLUTION INTRAMUSCULAR; INTRAVENOUS; SUBCUTANEOUS at 08:12

## 2023-12-13 RX ADMIN — POTASSIUM CHLORIDE 40 MEQ: 1500 TABLET, EXTENDED RELEASE ORAL at 08:12

## 2023-12-13 RX ADMIN — SODIUM BICARBONATE 650 MG TABLET 1300 MG: at 02:12

## 2023-12-13 RX ADMIN — SODIUM BICARBONATE 650 MG TABLET 1300 MG: at 08:12

## 2023-12-13 RX ADMIN — CEFEPIME 1 G: 1 INJECTION, POWDER, FOR SOLUTION INTRAMUSCULAR; INTRAVENOUS at 12:12

## 2023-12-13 RX ADMIN — TOBRAMYCIN SULFATE 345 MG: 40 INJECTION, SOLUTION INTRAMUSCULAR; INTRAVENOUS at 10:12

## 2023-12-13 NOTE — PLAN OF CARE
Problem: Adult Inpatient Plan of Care  Goal: Plan of Care Review  Outcome: Met  Goal: Patient-Specific Goal (Individualized)  Outcome: Met  Goal: Absence of Hospital-Acquired Illness or Injury  Outcome: Met  Goal: Optimal Comfort and Wellbeing  Outcome: Met  Goal: Readiness for Transition of Care  Outcome: Met     Problem: Infection  Goal: Absence of Infection Signs and Symptoms  Outcome: Met     Problem: Impaired Wound Healing  Goal: Optimal Wound Healing  Outcome: Met     Problem: Skin Injury Risk Increased  Goal: Skin Health and Integrity  Outcome: Met     Problem: Fall Injury Risk  Goal: Absence of Fall and Fall-Related Injury  Outcome: Met     Problem: Pain Acute  Goal: Acceptable Pain Control and Functional Ability  Outcome: Met     Problem: Fatigue  Goal: Improved Activity Tolerance  Outcome: Met     Problem: Adjustment to Illness (Chronic Kidney Disease)  Goal: Optimal Coping with Chronic Illness  Outcome: Met     Problem: Electrolyte Imbalance (Chronic Kidney Disease)  Goal: Electrolyte Balance  Outcome: Met     Problem: Fluid Volume Excess (Chronic Kidney Disease)  Goal: Fluid Balance  Outcome: Met     Problem: Functional Decline (Chronic Kidney Disease)  Goal: Optimal Functional Ability  Outcome: Met     Problem: Hematologic Alteration (Chronic Kidney Disease)  Goal: Absence of Anemia Signs and Symptoms  Outcome: Met     Problem: Oral Intake Inadequate (Chronic Kidney Disease)  Goal: Optimal Oral Intake  Outcome: Met     Problem: Pain (Chronic Kidney Disease)  Goal: Acceptable Pain Control  Outcome: Met     Problem: Renal Function Impairment (Chronic Kidney Disease)  Goal: Minimize Renal Failure Effects  Outcome: Met     Problem: Hypertension Acute  Goal: Blood Pressure Within Desired Range  Outcome: Met     Problem: UTI (Urinary Tract Infection)  Goal: Improved Infection Symptoms  Outcome: Met     Problem: Pain Chronic (Persistent) (Comorbidity Management)  Goal: Acceptable Pain Control and Functional  Ability  Outcome: Met     Problem: Fluid and Electrolyte Imbalance (Acute Kidney Injury/Impairment)  Goal: Fluid and Electrolyte Balance  Outcome: Met     Problem: Oral Intake Inadequate (Acute Kidney Injury/Impairment)  Goal: Optimal Nutrition Intake  Outcome: Met     Problem: Renal Function Impairment (Acute Kidney Injury/Impairment)  Goal: Effective Renal Function  Outcome: Met     Problem: Activity Intolerance  Goal: Enhanced Capacity and Energy  Outcome: Met     Problem: Adjustment to Surgery (Colostomy)  Goal: Optimal Coping  Outcome: Met     Problem: Bleeding (Colostomy)  Goal: Absence of Bleeding  Outcome: Met     Problem: Fluid, Electrolyte and Nutrition Imbalance (Colostomy)  Goal: Fluid, Electrolyte and Nutrition Balance  Outcome: Met     Problem: Infection (Colostomy)  Goal: Absence of Infection Signs and Symptoms  Outcome: Met     Problem: Ongoing Anesthesia Effects (Colostomy)  Goal: Anesthesia/Sedation Recovery  Outcome: Met     Problem: Pain (Colostomy)  Goal: Acceptable Pain Control  Outcome: Met     Problem: Postoperative Nausea and Vomiting (Colostomy)  Goal: Nausea and Vomiting Relief  Outcome: Met     Problem: Postoperative Stoma Care (Colostomy)  Goal: Optimal Stoma Healing  Outcome: Met     Problem: Postoperative Urinary Retention (Colostomy)  Goal: Effective Urinary Elimination  Outcome: Met     Problem: Respiratory Compromise (Colostomy)  Goal: Effective Oxygenation and Ventilation  Outcome: Met     Problem: Adjustment to Surgery (Ileostomy)  Goal: Optimal Coping  Outcome: Met     Problem: Bleeding (Ileostomy)  Goal: Absence of Bleeding  Outcome: Met     Problem: Fluid, Electrolyte and Nutrition Imbalance (Ileostomy)  Goal: Fluid, Electrolyte and Nutrition Balance  Outcome: Met     Problem: Infection (Ileostomy)  Goal: Absence of Infection Signs and Symptoms  Outcome: Met

## 2023-12-13 NOTE — ASSESSMENT & PLAN NOTE
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 UTI treated with cefepime. Blood cultures have remained NGTD.    - Initially admitted with ODESSA with Cr 1.6, which then increased to 1.9. This has improved to baseline Cr of 1.3- 1.4 with IV fluids.     Nephrology has been consulted for acidosis. HCO3 on admission 12. She was started on NaHCO3 650mg TID with subsequent increase to 16. Dose was increased to 1300 TID. HCO3 at time of consult 18. Per chart review, her recent baseline HCO3 over the last several months has been 18-20.   --- VBG with pH 7.19, pCO2 58.5. Anion gap ~11.5. This is consistent with primary respiratory acidosis as well as concomitant non anion gap metabolic acidosis. The metabolic acidosis is currently at baseline. Likely due to GI losses, high output ileostomy state contributes to significant HCO3 loss. S/p 1L isotonic bicarb with improvement    Recommendations:  - Okay to discharge from nephrology standpoint  - Increase home NaHCO3 to 1300 BID  - Outpatient nephrology follow up

## 2023-12-13 NOTE — PLAN OF CARE
[facility]  Discharge Final Note    Primary Care Provider: Trina Vu MD    Expected Discharge Date: 12/13/2023    Patient discharged to home via personal transportation.     Patient's bedside nurse and patient notified of the above.    Discharge Plan A and Plan B have been determined by review of patient's clinical status, future medical and therapeutic needs, and coverage/benefits for post-acute care in coordination with multidisciplinary team members.        Final Discharge Note (most recent)       Final Note - 12/13/23 1559          Final Note    Assessment Type Final Discharge Note (P)      Anticipated Discharge Disposition Home or Self Care (P)         Post-Acute Status    Post-Acute Authorization Other (P)      Other Status No Post-Acute Service Needs (P)                      Important Message from Medicare             Contact Info       [facility]   Specialty: Interventional Radiology    [address]  [facility] [address]   Phone: [phone]       Next Steps: Follow up    Instructions: SW left message for nurse to call and schedule follow up appointment; however, if you do not hear from someone within 48 hours contact the number listed.            Future Appointments   Date Time Provider Department Center   12/18/2023  9:20 AM Hammad Reynolds MD [facility] COLSURG [facility]   12/18/2023 10:00 AM Leslie Balbuena MD [facility] UROLOGY [facility]   12/20/2023 11:30 AM Charito Arroyo MD [facility] ID [facility]   12/22/2023  4:00 PM Jerry Ron MD [facility] Family        scheduled post-discharge follow-up appointment and information added to AVS.     Sultana Sagastume, DELPHINE  [facility]  Ext. [phone]

## 2023-12-13 NOTE — PLAN OF CARE
Problem: Adult Inpatient Plan of Care  Goal: Plan of Care Review  Outcome: Ongoing, Progressing  Goal: Patient-Specific Goal (Individualized)  Outcome: Ongoing, Progressing  Goal: Absence of Hospital-Acquired Illness or Injury  Outcome: Ongoing, Progressing  Goal: Optimal Comfort and Wellbeing  Outcome: Ongoing, Progressing  Goal: Readiness for Transition of Care  Outcome: Ongoing, Progressing     Problem: Infection  Goal: Absence of Infection Signs and Symptoms  Outcome: Ongoing, Progressing     Problem: Impaired Wound Healing  Goal: Optimal Wound Healing  Outcome: Ongoing, Progressing     Problem: Skin Injury Risk Increased  Goal: Skin Health and Integrity  Outcome: Ongoing, Progressing     Problem: Fall Injury Risk  Goal: Absence of Fall and Fall-Related Injury  Outcome: Ongoing, Progressing     Problem: Pain Acute  Goal: Acceptable Pain Control and Functional Ability  Outcome: Ongoing, Progressing     Problem: Fatigue  Goal: Improved Activity Tolerance  Outcome: Ongoing, Progressing     Problem: Adjustment to Illness (Chronic Kidney Disease)  Goal: Optimal Coping with Chronic Illness  Outcome: Ongoing, Progressing     Problem: Electrolyte Imbalance (Chronic Kidney Disease)  Goal: Electrolyte Balance  Outcome: Ongoing, Progressing     Problem: Fluid Volume Excess (Chronic Kidney Disease)  Goal: Fluid Balance  Outcome: Ongoing, Progressing     Problem: Functional Decline (Chronic Kidney Disease)  Goal: Optimal Functional Ability  Outcome: Ongoing, Progressing     Problem: Hematologic Alteration (Chronic Kidney Disease)  Goal: Absence of Anemia Signs and Symptoms  Outcome: Ongoing, Progressing     Problem: Oral Intake Inadequate (Chronic Kidney Disease)  Goal: Optimal Oral Intake  Outcome: Ongoing, Progressing     Problem: Pain (Chronic Kidney Disease)  Goal: Acceptable Pain Control  Outcome: Ongoing, Progressing     Problem: Renal Function Impairment (Chronic Kidney Disease)  Goal: Minimize Renal Failure  Effects  Outcome: Ongoing, Progressing     Problem: Hypertension Acute  Goal: Blood Pressure Within Desired Range  Outcome: Ongoing, Progressing     Problem: UTI (Urinary Tract Infection)  Goal: Improved Infection Symptoms  Outcome: Ongoing, Progressing     Problem: Pain Chronic (Persistent) (Comorbidity Management)  Goal: Acceptable Pain Control and Functional Ability  Outcome: Ongoing, Progressing     Problem: Fluid and Electrolyte Imbalance (Acute Kidney Injury/Impairment)  Goal: Fluid and Electrolyte Balance  Outcome: Ongoing, Progressing     Problem: Oral Intake Inadequate (Acute Kidney Injury/Impairment)  Goal: Optimal Nutrition Intake  Outcome: Ongoing, Progressing     Problem: Renal Function Impairment (Acute Kidney Injury/Impairment)  Goal: Effective Renal Function  Outcome: Ongoing, Progressing     Problem: Activity Intolerance  Goal: Enhanced Capacity and Energy  Outcome: Ongoing, Progressing     Problem: Adjustment to Surgery (Colostomy)  Goal: Optimal Coping  Outcome: Ongoing, Progressing     Problem: Bleeding (Colostomy)  Goal: Absence of Bleeding  Outcome: Ongoing, Progressing     Problem: Fluid, Electrolyte and Nutrition Imbalance (Colostomy)  Goal: Fluid, Electrolyte and Nutrition Balance  Outcome: Ongoing, Progressing     Problem: Infection (Colostomy)  Goal: Absence of Infection Signs and Symptoms  Outcome: Ongoing, Progressing     Problem: Ongoing Anesthesia Effects (Colostomy)  Goal: Anesthesia/Sedation Recovery  Outcome: Ongoing, Progressing     Problem: Pain (Colostomy)  Goal: Acceptable Pain Control  Outcome: Ongoing, Progressing     Problem: Postoperative Nausea and Vomiting (Colostomy)  Goal: Nausea and Vomiting Relief  Outcome: Ongoing, Progressing     Problem: Postoperative Stoma Care (Colostomy)  Goal: Optimal Stoma Healing  Outcome: Ongoing, Progressing     Problem: Postoperative Urinary Retention (Colostomy)  Goal: Effective Urinary Elimination  Outcome: Ongoing, Progressing      Problem: Respiratory Compromise (Colostomy)  Goal: Effective Oxygenation and Ventilation  Outcome: Ongoing, Progressing     Problem: Adjustment to Surgery (Ileostomy)  Goal: Optimal Coping  Outcome: Ongoing, Progressing     Problem: Bleeding (Ileostomy)  Goal: Absence of Bleeding  Outcome: Ongoing, Progressing     Problem: Fluid, Electrolyte and Nutrition Imbalance (Ileostomy)  Goal: Fluid, Electrolyte and Nutrition Balance  Outcome: Ongoing, Progressing     Problem: Infection (Ileostomy)  Goal: Absence of Infection Signs and Symptoms  Outcome: Ongoing, Progressing   Pt progressing toward goals. No distress noted. No falls or injuries during shift. Pt bed in lowest position. Side rails x2. Bed alarm activated. Call bell and personal belongs within reach. Telemetry maintained. Safety precautions maintained.

## 2023-12-13 NOTE — SUBJECTIVE & OBJECTIVE
Interval History: Pt looks and feels well today. Received 1L isotonic bicarb yesterday. UOP 1.1L. HCO3 22 today. Planning for discharge per  team    Review of patient's allergies indicates:   Allergen Reactions    Bee sting [allergen ext-venom-honey bee]      Rash      Grass pollen-bermuda, standard      rash     Current Facility-Administered Medications   Medication Frequency    acetaminophen tablet 650 mg Q4H PRN    cetirizine tablet 10 mg Daily PRN    enoxaparin injection 40 mg Daily    HYDROmorphone injection 1 mg Once    HYDROmorphone injection 1 mg Q4H PRN    melatonin tablet 6 mg Nightly PRN    miconazole NITRATE 2 % top powder BID    mirtazapine tablet 30 mg Nightly    naloxone 0.4 mg/mL injection 0.02 mg PRN    ondansetron injection 4 mg Q8H PRN    polyethylene glycol packet 17 g BID PRN    prochlorperazine injection Soln 5 mg Q6H PRN    sodium bicarbonate tablet 1,300 mg TID    sodium chloride 0.9% flush 10 mL Q6H PRN       Objective:     Vital Signs (Most Recent):  Temp: 97.5 °F (36.4 °C) (12/13/23 1120)  Pulse: 69 (12/13/23 1121)  Resp: 18 (12/13/23 1120)  BP: (!) 105/49 (12/13/23 1120)  SpO2: 100 % (12/13/23 1120) Vital Signs (24h Range):  Temp:  [97.5 °F (36.4 °C)-98.1 °F (36.7 °C)] 97.5 °F (36.4 °C)  Pulse:  [69-78] 69  Resp:  [16-18] 18  SpO2:  [98 %-100 %] 100 %  BP: ()/(49-55) 105/49     Weight: 81 kg (178 lb 9.2 oz) (12/11/23 0602)  Body mass index is 27.97 kg/m².  Body surface area is 1.96 meters squared.    I/O last 3 completed shifts:  In: -   Out: 2625 [Urine:1425; Stool:1200]     Physical Exam  Vitals and nursing note reviewed.   Constitutional:       General: She is not in acute distress.     Appearance: Normal appearance. She is not ill-appearing.      Comments: Eating breakfast, alert and conversational   HENT:      Head: Normocephalic and atraumatic.   Cardiovascular:      Rate and Rhythm: Normal rate and regular rhythm.   Pulmonary:      Effort: Pulmonary effort is normal. No  respiratory distress.   Abdominal:      General: Abdomen is flat.      Palpations: Abdomen is soft.      Comments: Ostomy present on right side of abdomen with watery, yellow/brown output   Genitourinary:     Comments: Left nephrostomy tube in place, draining clear, yellow urine  Musculoskeletal:      Cervical back: Normal range of motion. No rigidity.      Right lower leg: No edema.      Left lower leg: No edema.   Skin:     General: Skin is warm and dry.      Coloration: Skin is not jaundiced.   Neurological:      General: No focal deficit present.      Mental Status: She is alert. Mental status is at baseline.   Psychiatric:         Mood and Affect: Mood normal.         Behavior: Behavior normal.          Significant Labs:  All labs within the past 24 hours have been reviewed.     Significant Imaging:  Reviewed

## 2023-12-13 NOTE — PROGRESS NOTES
Urology Progress Note    Patient with Klebsiella in her urine. Recommend a 2 week course of culture appropriate abx. She has f/u with Dr. Reynolds on 12/18/23, we will plan on seeing her on the same day with Dr. Balbuena for surgical planning. IR outpt referral has been sent for her PCNT exchange which will need to happen within the next several weeks per their last note.     Urology will now sign off. Please call with any additional questions/concerns.     Rufus Dunn MD  Department of Urology  PGY-3  Pager: [phone]

## 2023-12-13 NOTE — PROGRESS NOTES
[facility]  Nephrology  Progress Note    Patient Name: Edita Riley  MRN: [id number]  Admission Date: 12/7/2023  Hospital Length of Stay: 5 days  Attending Provider: Jamar Campbell MD   Primary Care Physician: Trina Vu MD  Principal Problem:Pyelonephritis    Subjective:     HPI: 57 yo F with complicated medical history including cervical cancer in 2014 (recurrence in 2021) s/p chemo and radiation therapy complicated by bilateral ureteral strictures managed with bilateral stents, s/p transverse colon conduit c/b bowel perforation, now s/p ileostomy, s/p left percutaneous nephrostomy tube due to worsening left ureteral stricture, multiple MDR infections, CKDIIIa, htn, seizure disorder, and hypertension at the age of 7 presenting 12/7 for worsening abdominal pain. She has had recurrent admissions, including 2 admissions for pyelonephritis since August, displaced nephrostomy tube, and multiple ODESSA/MDR UTI's. She found to have MDR Klebsiella UTI, as well as clogged left nephrostomy tube, s/p irrigation with urology. UTI treated with cefepime. Blood cultures have remained NGTD.  Initially admitted with ODESSA with Cr 1.6, which then increased to 1.9. This has improved to baseline Cr of 1.3- 1.4 with IV fluids.     Nephrology has been consulted for acidosis. HCO3 on admission 12. She was started on NaHCO3 650mg TID with subsequent increase to 16. Dose was increased to 1300 TID. HCO3 at time of consult 18. VBG with pH 7.19, pCO2 58.5. Anion gap ~11.5. She reports that she has had decreased oral intake over the last several days to weeks, as well as increased ostomy output over the last several weeks. She has a history of mild emphysema noted on CT in 2020, however all recent imaging notable for healthy lungs.    Interval History: Pt looks and feels well today. Received 1L isotonic bicarb yesterday. UOP 1.1L. HCO3 22 today. Planning for discharge per  team    Review of patient's allergies  indicates:   Allergen Reactions    Bee sting [allergen ext-venom-honey bee]      Rash      Grass pollen-bermuda, standard      rash     Current Facility-Administered Medications   Medication Frequency    acetaminophen tablet 650 mg Q4H PRN    cetirizine tablet 10 mg Daily PRN    enoxaparin injection 40 mg Daily    HYDROmorphone injection 1 mg Once    HYDROmorphone injection 1 mg Q4H PRN    melatonin tablet 6 mg Nightly PRN    miconazole NITRATE 2 % top powder BID    mirtazapine tablet 30 mg Nightly    naloxone 0.4 mg/mL injection 0.02 mg PRN    ondansetron injection 4 mg Q8H PRN    polyethylene glycol packet 17 g BID PRN    prochlorperazine injection Soln 5 mg Q6H PRN    sodium bicarbonate tablet 1,300 mg TID    sodium chloride 0.9% flush 10 mL Q6H PRN       Objective:     Vital Signs (Most Recent):  Temp: 97.5 °F (36.4 °C) (12/13/23 1120)  Pulse: 69 (12/13/23 1121)  Resp: 18 (12/13/23 1120)  BP: (!) 105/49 (12/13/23 1120)  SpO2: 100 % (12/13/23 1120) Vital Signs (24h Range):  Temp:  [97.5 °F (36.4 °C)-98.1 °F (36.7 °C)] 97.5 °F (36.4 °C)  Pulse:  [69-78] 69  Resp:  [16-18] 18  SpO2:  [98 %-100 %] 100 %  BP: ()/(49-55) 105/49     Weight: 81 kg (178 lb 9.2 oz) (12/11/23 0602)  Body mass index is 27.97 kg/m².  Body surface area is 1.96 meters squared.    I/O last 3 completed shifts:  In: -   Out: 2625 [Urine:1425; Stool:1200]     Physical Exam  Vitals and nursing note reviewed.   Constitutional:       General: She is not in acute distress.     Appearance: Normal appearance. She is not ill-appearing.      Comments: Eating breakfast, alert and conversational   HENT:      Head: Normocephalic and atraumatic.   Cardiovascular:      Rate and Rhythm: Normal rate and regular rhythm.   Pulmonary:      Effort: Pulmonary effort is normal. No respiratory distress.   Abdominal:      General: Abdomen is flat.      Palpations: Abdomen is soft.      Comments: Ostomy present on right side of abdomen with watery, yellow/brown  output   Genitourinary:     Comments: Left nephrostomy tube in place, draining clear, yellow urine  Musculoskeletal:      Cervical back: Normal range of motion. No rigidity.      Right lower leg: No edema.      Left lower leg: No edema.   Skin:     General: Skin is warm and dry.      Coloration: Skin is not jaundiced.   Neurological:      General: No focal deficit present.      Mental Status: She is alert. Mental status is at baseline.   Psychiatric:         Mood and Affect: Mood normal.         Behavior: Behavior normal.          Significant Labs:  All labs within the past 24 hours have been reviewed.     Significant Imaging:  Reviewed  Assessment/Plan:     Renal/  Metabolic acidosis, NAG, bicarbonate losses  57 yo F with complicated ureteral medical history, now s/p left nephrostomy tube placement (as well as right, removed in 2021) and ileostomy complicated by recurrent MDR infections, CKDIIIa, and htn admitted 12/7 MDR Klebsiella UTI, as well as clogged left nephrostomy tube, s/p irrigation with urology. UTI treated with cefepime. Blood cultures have remained NGTD.    - Initially admitted with ODESSA with Cr 1.6, which then increased to 1.9. This has improved to baseline Cr of 1.3- 1.4 with IV fluids.     Nephrology has been consulted for acidosis. HCO3 on admission 12. She was started on NaHCO3 650mg TID with subsequent increase to 16. Dose was increased to 1300 TID. HCO3 at time of consult 18. Per chart review, her recent baseline HCO3 over the last several months has been 18-20.   --- VBG with pH 7.19, pCO2 58.5. Anion gap ~11.5. This is consistent with primary respiratory acidosis as well as concomitant non anion gap metabolic acidosis. The metabolic acidosis is currently at baseline. Likely due to GI losses, high output ileostomy state contributes to significant HCO3 loss. S/p 1L isotonic bicarb with improvement    Recommendations:  - Okay to discharge from nephrology standpoint  - Increase home NaHCO3 to  1300 BID  - Outpatient nephrology follow up         Thank you for your consult. I will sign off. Please contact us if you have any additional questions.    Zee Rivera, DO  Nephrology  [facility]

## 2023-12-13 NOTE — PLAN OF CARE
Problem: Adult Inpatient Plan of Care  Goal: Plan of Care Review  12/13/2023 0634 by Chana Boyle LPN  Outcome: Ongoing, Progressing  12/13/2023 0634 by Chana Boyle LPN  Outcome: Ongoing, Progressing     Problem: Infection  Goal: Absence of Infection Signs and Symptoms  12/13/2023 0634 by Chana Boyle LPN  Outcome: Ongoing, Progressing  12/13/2023 0634 by Chana Boyle LPN  Outcome: Ongoing, Progressing     Problem: Skin Injury Risk Increased  Goal: Skin Health and Integrity  12/13/2023 0634 by Chana Boyle LPN  Outcome: Ongoing, Progressing  12/13/2023 0634 by Chana Boyle LPN  Outcome: Ongoing, Progressing     Problem: Fall Injury Risk  Goal: Absence of Fall and Fall-Related Injury  12/13/2023 0634 by Chana Boyle LPN  Outcome: Ongoing, Progressing  12/13/2023 0634 by Chana Boyle LPN  Outcome: Ongoing, Progressing     Problem: Fatigue  Goal: Improved Activity Tolerance  12/13/2023 0634 by Chana Boyle LPN  Outcome: Ongoing, Progressing  12/13/2023 0634 by Chana Boyle LPN  Outcome: Ongoing, Progressing     Problem: UTI (Urinary Tract Infection)  Goal: Improved Infection Symptoms  12/13/2023 0634 by Chana Boyle LPN  Outcome: Ongoing, Progressing  12/13/2023 0634 by Chana Boyle LPN  Outcome: Ongoing, Progressing     Problem: Fluid, Electrolyte and Nutrition Imbalance (Colostomy)  Goal: Fluid, Electrolyte and Nutrition Balance  12/13/2023 0634 by Chaan Boyle LPN  Outcome: Ongoing, Progressing  12/13/2023 0634 by Chana Boyle LPN  Outcome: Ongoing, Progressing

## 2023-12-13 NOTE — DISCHARGE SUMMARY
[facility] [address] [place] [address] Medicine  Discharge Summary      Patient Name: Edita Riley  MRN: [id number]  LUIZ: [id number]  Patient Class: IP- Inpatient  Admission Date: 12/7/2023  Hospital Length of Stay: 5 days  Discharge Date and Time:  12/13/2023 1:13 PM  Attending Physician: Jamar Campbell MD   Discharging Provider: Jamar Campbell MD  Primary Care Provider: Trina Vu MD  [facility] Medicine Team: [facility] Jamar Campbell MD  Primary Care Team: [facility]    HPI:   57 y/o AAF hx of Cervical Cancer s/p XRT & Bilateral Ureteral strictures with obstructive uropathy now s/p transverse colon conduit, s/p Left Nephrostomy tube, s/p Ileostomy, hx of MDR UTI/pyelonephritis presents to the ED with complaints of abdominal pain.  She reports since Monday 12/4, she has had recurrent nausea vomiting over 3 days, poor diet/oral intake with associated weakness.     2 recent hospitalizations with left sided pyelonephritis, prior urine cultures show different MDR bacteria that have grown.  She is pending evaluation by colo-rectal surgery for outpatient revision of urostomy, follows with Dr. Balbuena (urology)     Reports having epigastric pain 10/10 on arrival,  9/10 during interview, only mild relief with initial morphine dosage.   She does not note any change in the ileostomy stool output, no reported change in amount or quality from her urostomy or her left nephrostomy tube. She denies any fevers of chills.     ED Treatment: Morphine 4mg IV,  Zofran 4mg IV, NS 1Liter    * No surgery found *      Hospital Course:   ID consulted for concerns for pyelonephritis. Urine cx grew Klebsiella. Received empiric cefepime x5 days followed by tobramycin IV once dose on 12/13.  Hosp course complicated by ODESSA. Resolved with IVF. Cr at baseline now.  Retroperitoneal USG with b/l hydronephrosis. Urology consulted. Left nephrostomy tube was found to be clogged, and was irrigated is now draining urine freely.   No indications for urologic interventions at this time. Recommend continued PCNT exchanges with IR q6-8 weeks. Urology plans to coordinate schedules with CRS for joint anastomotic revision surgery in the near future.Consulted CRS. Rec OP follow up for surgical planning. Ostomy care RN consulted. Consulted PT/OT, for ambulation while in hospital, rec low intensity exercise. Dietary consulted. Added boost shakes TID to optimize albumin prior to surgery. For metabolic acidosis. Increased home sodium bicarb from 600 TID to 1300 TID w/o improvement. Consulted nephro. Patient received 1L isotonic bicarb with improvement in serum bicarb. Per nephro okay to d/c with sodium bicarb 1300 BID.     Patient is currently medically and HDS. She is being d/c home. FU with PCP, nephro, urology, CRS and ID as OP. ER precautions given. Plan of care discussed with patient, verbalized understanding. All questions were answered.        Goals of Care Treatment Preferences:  Code Status: Full Code      Consults:   Consults (From admission, onward)          Status Ordering Provider     Inpatient consult to Nephrology  Once        Provider:  (Not yet assigned)    Completed AURE, SEEMAL     Inpatient consult to Colorectal Surgery  Once        Provider:  (Not yet assigned)    Completed AURE, SEEMAL     Inpatient consult to Registered Dietitian/Nutritionist  Once        Provider:  (Not yet assigned)    Completed AURE, SEEMAL     Inpatient consult to Urology  Once        Provider:  (Not yet assigned)    Completed AURE, SEEMAL     Inpatient consult to Infectious Diseases  Once        Provider:  (Not yet assigned)    Completed AURE, SEEMAL     Inpatient consult to Social Work  Once        Provider:  (Not yet assigned)    Acknowledged AURE, SEEMAL            No new Assessment & Plan notes have been filed under this hospital service since the last note was generated.  Service: Hospital Medicine    Final Active Diagnoses:    Diagnosis Date  "Noted POA    PRINCIPAL PROBLEM:  Pyelonephritis [N12] 06/11/2020 Yes    Weakness [R53.1] 12/08/2023 Yes    Metabolic acidosis, NAG, bicarbonate losses [E87.20] 12/08/2023 Yes    Azotemia [R79.89] 12/07/2023 Yes    CKD (chronic kidney disease), stage III [N18.30] 02/14/2023 Yes    Nephrostomy status [Z93.6]  Not Applicable     Chronic    Presence of urostomy [Z93.6] 02/08/2021 Not Applicable    History of DVT (deep vein thrombosis) [Z86.718] 02/03/2021 Not Applicable     Chronic    Ileostomy in place [Z93.2]  Not Applicable     Chronic    Hydronephrosis [N13.30] 06/11/2020 Yes    Essential hypertension [I10] 05/04/2015 Yes      Problems Resolved During this Admission:       Discharged Condition: stable    Disposition: Home or Self Care    Follow Up:   Follow-up Information       [facility] 6[facility] Follow up.    Specialty: Interventional Radiology  Why: SW left message for nurse to call and schedule follow up appointment; however, if you do not hear from someone within 48 hours contact the number listed.  Contact information:  [address]  [facility] [address] [phone]  Additional information:  Clinic [place] - 6th Floor   Please park in [address] and use Clinic elevator                         Patient Instructions:      BATH/SHOWER CHAIR FOR HOME USE     Order Specific Question Answer Comments   Height: 5' 7" (1.702 m)    Weight: 81 kg (178 lb 9.2 oz)    Does patient have medical equipment at home? walker, rolling    Does patient have medical equipment at home? wheelchair    Length of need (1-99 months): 99    Type: With back    Vendor: [facility] PATIENT REFUSED BECAUSE SHE HAS ONE AT HOME   CRM Resolution Date: 12/13/2023      Ambulatory referral/consult to Interventional Radiology   Standing Status: Future   Referral Priority: Routine Referral Type: Consultation   Referral Reason: Specialty Services Required   Requested Specialty: Interventional Radiology   Number of " Visits Requested: 1     Ambulatory referral/consult to Colorectal Surgery   Standing Status: Future   Referral Priority: Routine Referral Type: Consultation   Referral Reason: Specialty Services Required   Requested Specialty: Colon and Rectal Surgery   Number of Visits Requested: 1     Ambulatory referral/consult to Urology   Standing Status: Future   Referral Priority: Routine Referral Type: Consultation   Referral Reason: Specialty Services Required   Requested Specialty: Urology   Number of Visits Requested: 1     Ambulatory referral/consult to Infectious Disease   Standing Status: Future   Referral Priority: Routine Referral Type: Consultation   Referral Reason: Specialty Services Required   Requested Specialty: Infectious Diseases   Number of Visits Requested: 1     Ambulatory referral/consult to Physical/Occupational Therapy   Standing Status: Future   Referral Priority: Routine Referral Type: Physical Medicine   Referral Reason: Specialty Services Required   Number of Visits Requested: 1       Significant Diagnostic Studies: N/A    Pending Diagnostic Studies:       None           Medications:  Reconciled Home Medications:      Medication List        CHANGE how you take these medications      sodium bicarbonate 650 MG tablet  Take 2 tablets (1,300 mg total) by mouth 2 (two) times daily.  What changed:   how much to take  when to take this            CONTINUE taking these medications      loratadine 10 mg tablet  Commonly known as: CLARITIN  Take 10 mg by mouth daily as needed for Allergies.     mirtazapine 30 MG tablet  Commonly known as: REMERON  Take 1 tablet (30 mg total) by mouth nightly.     ondansetron 4 MG Tbdl  Commonly known as: ZOFRAN-ODT  Dissolve 1 tablet (4 mg total) by mouth every 8 (eight) hours as needed (nausea).              Indwelling Lines/Drains at time of discharge:   Lines/Drains/Airways       Drain  Duration                  Urostomy 09/11/20 0000 ileal conduit LLQ 1188 days          Ileostomy 09/18/20 RLQ 1181 days         Nephrostomy 10/26/23 0933 Left 12 Fr. 48 days                    Time spent on the discharge of patient: 35 minutes         Jamar Campbell MD  Department of Hospital Medicine  [facility]

## 2023-12-14 NOTE — PT/OT/SLP PROGRESS
Physical Therapy Treatment    Patient Name:  Eidta Riley   MRN:  [id number]  Admit Date: 12/7/2023  Admitting Diagnosis:  Pyelonephritis   Length of Stay: 5 days  Recent Surgery: * No surgery found *      Recommendations:     Discharge Recommendations:  Low Intensity Therapy   Discharge Equipment Recommendations: bath bench   Barriers to discharge: None    Plan:     During this hospitalization, patient to be seen 4 x/week to address the listed problems via gait training, therapeutic activities, therapeutic exercises, neuromuscular re-education  Plan of Care Expires:  01/11/24  Plan of Care Reviewed with: patient    Assessment:     Edita Riley is a 58 y.o. female admitted with a medical diagnosis of Pyelonephritis.       Problem List: impaired endurance, impaired cardiopulmonary response to activity, impaired balance.  Rehab Prognosis: Good     GOALS:   Multidisciplinary Problems       Physical Therapy Goals          Problem: Physical Therapy    Goal Priority Disciplines Outcome Goal Variances Interventions   Physical Therapy Goal     PT, PT/OT Ongoing, Progressing     Description: PT goals to be met by: 1/12/23    Patient will perform rolling each way with supervision.   Patient will perform supine <> sitting with supervision.  Patient will perform sit <> stand transitions with supervision using RW.  Patient will perform transfers from bed <> chair or BSC with supervision using RW.  Patient will ambulate 200 feet with supervision using RW.  Patient will ascend/descend 4 steps using handrails with supervision.                       Subjective   Communicated with RN prior to session.  Patient found HOB elevated upon PT entry to room, agreeable to evaluation. Edita Riley's alone during session.    Chief Complaint: none reported   Patient/Family Comments/goals: to get better   Pain/Comfort:  Pain Rating 1: 0/10  Pain Rating Post-Intervention 1: 0/10    Objective:   Patient found  "with: telemetry, peripheral IV, nephrostomy (urostomy, ileostmy)   General Precautions: Standard, Cardiac fall   Orthopedic Precautions:N/A   Braces: N/A   Oxygen Device: Room Air  Vitals: BP (!) 105/49 (BP Location: Right arm, Patient Position: Sitting)   Pulse 69   Temp 97.5 °F (36.4 °C) (Tympanic)   Resp 18   Ht 5' 7" (1.702 m)   Wt 81 kg (178 lb 9.2 oz)   LMP 06/08/2014 (Approximate)   SpO2 100%   Breastfeeding No   BMI 27.97 kg/m²     Outcome Measures:  AM-PAC 6 CLICK MOBILITY  Turning over in bed (including adjusting bedclothes, sheets and blankets)?: 4  Sitting down on and standing up from a chair with arms (e.g., wheelchair, bedside commode, etc.): 4  Moving from lying on back to sitting on the side of the bed?: 4  Moving to and from a bed to a chair (including a wheelchair)?: 4  Need to walk in hospital room?: 3  Climbing 3-5 steps with a railing?: 3  Basic Mobility Total Score: 22       Functional Mobility:  Additional staff present: OT  Bed Mobility:   Supine to Sit: independence; HOB elevated  Scooting anteriorly to EOB to have both feet planted on floor: independence      Sitting Balance at Edge of Bed:  Assistance Level Required: [place]    Transfers:   Sit <> Stand Transfer: supervision with no assistive device from EOB using RW      Gait:  Patient ambulated: 30ft using RW with SBA + 250ft using RW with SBA + 50ft using no AD with SBA  Gait Pattern observed: reciprocal gait  Gait Deviation(s): occasional unsteady gait and decreased karma  Impairments due to: impaired balance  Comments:   Verbal cuing for pacing and purposeful steps     Stairs:  Ascending/Descend 4 stairs with SBA using L HR    Therapeutic Activities, Exercises, and Education:   Educated pt on PT role/POC  Educated pt on importance of OOB activity and daily ambulation   Pt verbalized understanding         Patient left  sitting EOB  with all lines intact, call button in reach, and RN notified..    Time Tracking:     PT " Received On: 12/13/23  PT Start Time: 0924     PT Stop Time: 0948  PT Total Time (min): 24 min       Billable Minutes:   Gait Training 23    Treatment Type: Treatment  PT/PTA: PT

## 2023-12-15 ENCOUNTER — TELEPHONE (OUTPATIENT)
Dept: SURGERY | Facility: CLINIC | Age: 58
End: 2023-12-15
Payer: MEDICAID

## 2023-12-18 ENCOUNTER — OFFICE VISIT (OUTPATIENT)
Dept: UROLOGY | Facility: CLINIC | Age: 58
End: 2023-12-18
Payer: MEDICAID

## 2023-12-18 VITALS
HEART RATE: 90 BPM | BODY MASS INDEX: 27.43 KG/M2 | WEIGHT: 185.19 LBS | SYSTOLIC BLOOD PRESSURE: 118 MMHG | HEIGHT: 69 IN | DIASTOLIC BLOOD PRESSURE: 56 MMHG

## 2023-12-18 DIAGNOSIS — D63.8 ANEMIA OF CHRONIC DISEASE: Primary | ICD-10-CM

## 2023-12-18 DIAGNOSIS — N13.1 HYDRONEPHROSIS WITH URETERAL STRICTURE: ICD-10-CM

## 2023-12-18 DIAGNOSIS — Z93.6 NEPHROSTOMY STATUS: Chronic | ICD-10-CM

## 2023-12-18 PROCEDURE — 1111F DSCHRG MED/CURRENT MED MERGE: CPT | Mod: CPTII,,, | Performed by: UROLOGY

## 2023-12-18 PROCEDURE — 3074F SYST BP LT 130 MM HG: CPT | Mod: CPTII,,, | Performed by: UROLOGY

## 2023-12-18 PROCEDURE — 3078F PR MOST RECENT DIASTOLIC BLOOD PRESSURE < 80 MM HG: ICD-10-PCS | Mod: CPTII,,, | Performed by: UROLOGY

## 2023-12-18 PROCEDURE — 3078F DIAST BP <80 MM HG: CPT | Mod: CPTII,,, | Performed by: UROLOGY

## 2023-12-18 PROCEDURE — 99999 PR PBB SHADOW E&M-EST. PATIENT-LVL III: CPT | Mod: PBBFAC,,, | Performed by: UROLOGY

## 2023-12-18 PROCEDURE — 99213 OFFICE O/P EST LOW 20 MIN: CPT | Mod: PBBFAC | Performed by: UROLOGY

## 2023-12-18 PROCEDURE — 99214 PR OFFICE/OUTPT VISIT, EST, LEVL IV, 30-39 MIN: ICD-10-PCS | Mod: S$PBB,,, | Performed by: UROLOGY

## 2023-12-18 PROCEDURE — 1111F PR DISCHARGE MEDS RECONCILED W/ CURRENT OUTPATIENT MED LIST: ICD-10-PCS | Mod: CPTII,,, | Performed by: UROLOGY

## 2023-12-18 PROCEDURE — 99214 OFFICE O/P EST MOD 30 MIN: CPT | Mod: S$PBB,,, | Performed by: UROLOGY

## 2023-12-18 PROCEDURE — 3074F PR MOST RECENT SYSTOLIC BLOOD PRESSURE < 130 MM HG: ICD-10-PCS | Mod: CPTII,,, | Performed by: UROLOGY

## 2023-12-18 PROCEDURE — 3008F BODY MASS INDEX DOCD: CPT | Mod: CPTII,,, | Performed by: UROLOGY

## 2023-12-18 PROCEDURE — 99999 PR PBB SHADOW E&M-EST. PATIENT-LVL III: ICD-10-PCS | Mod: PBBFAC,,, | Performed by: UROLOGY

## 2023-12-18 PROCEDURE — 1159F PR MEDICATION LIST DOCUMENTED IN MEDICAL RECORD: ICD-10-PCS | Mod: CPTII,,, | Performed by: UROLOGY

## 2023-12-18 PROCEDURE — 1159F MED LIST DOCD IN RCRD: CPT | Mod: CPTII,,, | Performed by: UROLOGY

## 2023-12-18 PROCEDURE — 3008F PR BODY MASS INDEX (BMI) DOCUMENTED: ICD-10-PCS | Mod: CPTII,,, | Performed by: UROLOGY

## 2023-12-18 RX ORDER — FERROUS SULFATE 325(65) MG
325 TABLET ORAL 2 TIMES DAILY
Qty: 60 TABLET | Refills: 2 | Status: SHIPPED | OUTPATIENT
Start: 2023-12-18 | End: 2024-03-12

## 2023-12-18 NOTE — PROGRESS NOTES
CHIEF COMPLAINT:    Mrs. Riley is a 58 y.o. female presenting for a follow up on left ureterocolic anastamotic stricture  PRESENTING ILLNESS:    Edita Riley is a 58 y.o. female who presents with a history of radiation therapy for cervical cancer which was complicated by bilateral distal ureteral obstruction with bilateral hydronephrosis.  She is status post colon conduit which was performed in 2020.  This was complicated by large bowel perforation away from the anastamosis.  She has an ileostomy and a left percutaneous nephrostomy that was last exchanged on 10/26/2023.  She was admitted to the hospital on 12/7/2023 after her percutaneous nephrostomy tube was clogged and she had been feeling ill.  She was scheduled for revision but missed her appointment with Dr. Reynolds and had to be rescheduled.     She is accompanied today by her .     REVIEW OF SYSTEMS:    Review of Systems   Constitutional: Negative.    HENT: Negative.     Eyes: Negative.    Respiratory: Negative.     Cardiovascular: Negative.    Gastrointestinal: Negative.    Genitourinary: Negative.    Musculoskeletal: Negative.    Skin: Negative.    Neurological: Negative.    Endo/Heme/Allergies: Negative.    Psychiatric/Behavioral: Negative.         PATIENT HISTORY:    Past Medical History:   Diagnosis Date    Abnormal mammogram 08/25/2020    Abnormal mammogram 08/25/2020    Acute blood loss anemia     Acute deep vein thrombosis (DVT) of lower extremity 12/09/2020    Advance care planning 04/30/2021    ODESSA (acute kidney injury) 03/21/2020    Anemia due to chronic blood loss     Anemia due to chronic kidney disease     Anemia due to chronic kidney disease 05/18/2020    Anxiety     Bilateral ureteral obstruction 09/11/2020    Bilious vomiting with nausea 06/11/2023    Cardiovascular event risk -- low 09/14/2015    ASCVD 10-year risk 1.9% (with optimal risk factors 1.3%) as of 9/14/2015     Cervical cancer 2014    Chronic back pain      Colostomy care     Deep vein thrombosis     Depression     Diarrhea due to malabsorption 11/14/2018    Difficult intubation     Discharge planning issues 04/30/2021    Disorder of kidney and ureter     DVT of lower extremity, bilateral 11/04/2020    Edema 04/10/2023    Emphysema of lung 04/10/2023    Fibromyalgia     Fungemia 09/27/2020    Generalized abdominal pain 08/25/2020    GERD (gastroesophageal reflux disease)     Hemifacial spasm 09/16/2015    Hiatal hernia 2014    History of cervical cancer 10/11/2018    History of essential hypertension 05/04/2015    Not requiring medications at this time  BP is low- concern that this may correlate with increased stool output from stoma. Encourage her to contact GI and her PCP.    Hx of psychiatric care     Cymbalta, trazodone    Hypertension     Hypomagnesemia 11/21/2018    Hyponatremia 09/10/2023    Impaired functional mobility, balance, gait, and endurance 07/24/2015    Lactose intolerance     Major depressive disorder, recurrent episode, moderate 06/02/2023    Metastatic squamous cell carcinoma to lymph node 10/11/2018    Moderate episode of recurrent major depressive disorder 04/21/2021    Nephrostomy complication 10/26/2023    Neuropathy due to chemotherapeutic drug 11/14/2018    Osteoarthritis of back     Peritonitis 09/22/2020    Physical deconditioning 04/30/2021    Pseudomonas urinary tract infection 04/21/2021    Psychiatric problem     Pyelitis 09/09/2023    QT prolongation 04/16/2021    Refusal of blood transfusions as patient is Hinduism     Schatzki's ring 09/14/2015    Seen on outside EGD 05/2014, underwent esophageal dilatation. Bx were negative.     Seizure-like activity 12/02/2018    Seizures     Sleep stage dysfunction     Noted on PSG 06/2017; negative for obstructive sleep apnea     Stroke     Urinary tract infection associated with nephrostomy catheter 06/11/2020    Wound infection after surgery 09/24/2020       Past Surgical History:    Procedure Laterality Date    ANTEGRADE NEPHROSTOGRAPHY Bilateral 9/28/2020    Procedure: Nephrostogram - antegrade;  Surgeon: Leslie Balbuena MD;  Location: [facility] OR 1ST FLR;  Service: Urology;  Laterality: Bilateral;    ANTEGRADE NEPHROSTOGRAPHY Left 4/20/2021    Procedure: NEPHROSTOGRAM, ANTEGRADE;  Surgeon: Leslie Balbuena MD;  Location: [facility] OR 1ST FLR;  Service: Urology;  Laterality: Left;    ANTEGRADE NEPHROSTOGRAPHY Right 10/27/2022    Procedure: NEPHROSTOGRAM, ANTEGRADE;  Surgeon: Chirag Russ MD;  Location: [facility] OR 1ST FLR;  Service: Urology;  Laterality: Right;    ANTEGRADE NEPHROSTOGRAPHY Right 4/21/2023    Procedure: NEPHROSTOGRAM, ANTEGRADE;  Surgeon: Chirag Russ MD;  Location: [facility] OR 2ND FLR;  Service: Urology;  Laterality: Right;    ANTEGRADE NEPHROSTOGRAPHY Left 6/6/2023    Procedure: Nephrostogram - antegrade;  Surgeon: Leslie Balbuena MD;  Location: [facility] OR 1ST FLR;  Service: Urology;  Laterality: Left;    BILATERAL OOPHORECTOMY  2015    CHOLECYSTECTOMY  11/09/2016    Done at [facility], path showed chronic cholecystitis and gallstones    cold knife conization  2014    COLECTOMY, RIGHT  9/17/2020    Procedure: COLECTOMY, RIGHT Extended;  Surgeon: Hammad Reynolds MD;  Location: [facility] OR 2ND FLR;  Service: Colon and Rectal;;    COLONOSCOPY  2014    COLONOSCOPY N/A 10/24/2016    at [facility], Dr Thomas    COLONOSCOPY N/A 5/18/2018    Procedure: COLONOSCOPY;  Surgeon: Arden Gutiérrez MD;  Location: [facility] ENDO (4TH FLR);  Service: Endoscopy;  Laterality: N/A;    COLONOSCOPY N/A 7/28/2020    Procedure: COLONOSCOPY;  Surgeon: Hammad Reynolds MD;  Location: [facility] ENDO (4TH FLR);  Service: Colon and Rectal;  Laterality: N/A;  covid test [place] 7/25    CYSTOSCOPY WITH URETEROSCOPY, RETROGRADE PYELOGRAPHY, AND INSERTION OF STENT Bilateral 3/21/2020    Procedure: CYSTOSCOPY, WITH RETROGRADE PYELOGRAM,;  Surgeon: Leslie Balbuena MD;  Location: [facility] OR 1ST FLR;  Service: Urology;  Laterality:  Bilateral;    DILATION OF NEPHROSTOMY TRACT Right 10/27/2022    Procedure: DILATION, NEPHROSTOMY TRACT;  Surgeon: Chirag Russ MD;  Location: [facility] OR 1ST FLR;  Service: Urology;  Laterality: Right;    ESOPHAGOGASTRODUODENOSCOPY  2014    ESOPHAGOGASTRODUODENOSCOPY  11/18/2020    ESOPHAGOGASTRODUODENOSCOPY N/A 11/18/2020    Procedure: ESOPHAGOGASTRODUODENOSCOPY (EGD);  Surgeon: Zenon Spencer MD;  Location: [facility] ENDO;  Service: Endoscopy;  Laterality: N/A;    ESOPHAGOGASTRODUODENOSCOPY N/A 12/11/2020    Procedure: EGD (ESOPHAGOGASTRODUODENOSCOPY);  Surgeon: Juancho Muse MD;  Location: [facility] ENDO (2ND FLR);  Service: Endoscopy;  Laterality: N/A;    HYSTERECTOMY  2014    [facility] for cervical cancer    ILEOSTOMY  9/17/2020    Procedure: CREATION, ILEOSTOMY;  Surgeon: Hammad Reynolds MD;  Location: [facility] OR 2ND FLR;  Service: Colon and Rectal;;    LOOPOGRAM N/A 4/20/2021    Procedure: LOOPOGRAM;  Surgeon: Leslie Balbuena MD;  Location: [facility] OR 1ST FLR;  Service: Urology;  Laterality: N/A;    LOOPOGRAM N/A 6/6/2023    Procedure: LOOPOGRAM;  Surgeon: Leslie Balbuena MD;  Location: [facility] OR 1ST FLR;  Service: Urology;  Laterality: N/A;    MOBILIZATION OF SPLENIC FLEXURE N/A 9/11/2020    Procedure: MOBILIZATION, COLONIC;  Surgeon: Hammad Reynolds MD;  Location: [facility] OR 2ND FLR;  Service: Colon and Rectal;  Laterality: N/A;    NEPHROSTOGRAPHY Bilateral 4/17/2021    Procedure: Nephrostogram;  Surgeon: Celeste Surgeon;  Location: [facility] CELESTE;  Service: Anesthesiology;  Laterality: Bilateral;    OOPHORECTOMY      PERCUTANEOUS NEPHROLITHOTOMY Right 4/21/2023    Procedure: NEPHROLITHOTOMY, PERCUTANEOUS;  Surgeon: Chirag Russ MD;  Location: [facility] OR 2ND FLR;  Service: Urology;  Laterality: Right;  2.5 hrs    PERCUTANEOUS NEPHROSTOMY  4/21/2023    Procedure: CREATION, NEPHROSTOMY, PERCUTANEOUS with removal of existing nephrostomy tube;  Surgeon: Chirag Russ MD;  Location: [facility] OR [address] [place] [address];  Service: Urology;;     PYELOSCOPY Right 10/27/2022    Procedure: PYELOSCOPY;  Surgeon: Chirag Russ MD;  Location: [facility] OR 1ST FLR;  Service: Urology;  Laterality: Right;    REPLACEMENT OF NEPHROSTOMY TUBE Right 10/27/2022    Procedure: REPLACEMENT, NEPHROSTOMY TUBE;  Surgeon: Chirag Russ MD;  Location: [facility] OR 1ST FLR;  Service: Urology;  Laterality: Right;    RETROPERITONEAL LYMPHADENECTOMY Right 2018    Procedure: LYMPHADENECTOMY, RETROPERITONEUM;  Surgeon: Sebas Reed MD;  Location: [facility] OR 2ND FLR;  Service: General;  Laterality: Right;  ILIAC    URETEROSCOPIC REMOVAL OF URETERIC CALCULUS Right 10/27/2022    Procedure: REMOVAL, CALCULUS, URETER, URETEROSCOPIC;  Surgeon: Chirag Russ MD;  Location: [facility] OR 1ST FLR;  Service: Urology;  Laterality: Right;    URETEROSCOPY Right 10/27/2022    Procedure: URETEROSCOPY-ANTEGRADE;  Surgeon: Chirag Russ MD;  Location: [facility] OR 1ST FLR;  Service: Urology;  Laterality: Right;       Family History   Problem Relation Age of Onset    Heart disease Sister     Heart disease Maternal Grandmother     Colon cancer Father     Esophageal cancer Father     Cancer Father         Lung-smoker    Cancer Mother         Cervical    Cervical cancer Mother     Breast cancer Maternal Aunt     Suicide Daughter         jumped from parking structure    Drug abuse Daughter     Drug abuse Daughter      Social History     Socioeconomic History    Marital status:      Spouse name: Hammad    Number of children: 2   Tobacco Use    Smoking status: Never    Smokeless tobacco: Never   Substance and Sexual Activity    Alcohol use: No     Alcohol/week: 0.0 standard drinks of alcohol    Drug use: No    Sexual activity: Yes     Partners: Male     Birth control/protection: None     Comment:  19 years since    Social History Narrative    , twin daughters (1  2018), disabled due to childhood stroke, [employer]     Social Determinants of Health      Financial Resource Strain: Low Risk  (12/8/2023)    Overall Financial Resource Strain (CARDIA)     Difficulty of Paying Living Expenses: Not hard at all   Recent Concern: Financial Resource Strain - Medium Risk (9/11/2023)    Overall Financial Resource Strain (CARDIA)     Difficulty of Paying Living Expenses: Somewhat hard   Food Insecurity: No Food Insecurity (12/8/2023)    Hunger Vital Sign     Worried About Running Out of Food in the Last Year: Never true     Ran Out of Food in the Last Year: Never true   Transportation Needs: No Transportation Needs (12/8/2023)    PRAPARE - Transportation     Lack of Transportation (Medical): No     Lack of Transportation (Non-Medical): No   Physical Activity: Insufficiently Active (11/4/2023)    Exercise Vital Sign     Days of Exercise per Week: 4 days     Minutes of Exercise per Session: 20 min   Stress: Stress Concern Present (11/4/2023)    [employer] - Occupational Stress Questionnaire     Feeling of Stress : To some extent   Social Connections: Socially Integrated (12/8/2023)    Social Connection and Isolation Panel [NHANES]     Frequency of Communication with Friends and Family: Three times a week     Frequency of Social Gatherings with Friends and Family: Once a week     Attends Gnosticist Services: 1 to 4 times per year     Active Member of Clubs or Organizations: No     Attends Club or Organization Meetings: 1 to 4 times per year     Marital Status:    Recent Concern: Social Connections - Moderately Isolated (9/11/2023)    Social Connection and Isolation Panel [NHANES]     Frequency of Communication with Friends and Family: Three times a week     Frequency of Social Gatherings with Friends and Family: Three times a week     Attends Gnosticist Services: Never     Active Member of Clubs or Organizations: No     Attends Club or Organization Meetings: Never     Marital Status:    Housing Stability: Low Risk  (12/8/2023)     Housing Stability Vital Sign     Unable to Pay for Housing in the Last Year: No     Number of Places Lived in the Last Year: 1     Unstable Housing in the Last Year: No       Allergies:  Bee sting [allergen ext-venom-honey bee] and Grass pollen-bermuda, standard    Medications:  Outpatient Encounter Medications as of 12/18/2023   Medication Sig Dispense Refill    loratadine (CLARITIN) 10 mg tablet Take 10 mg by mouth daily as needed for Allergies.      mirtazapine (REMERON) 30 MG tablet Take 1 tablet (30 mg total) by mouth nightly. 30 tablet 11    ondansetron (ZOFRAN-ODT) 4 MG TbDL Dissolve 1 tablet (4 mg total) by mouth every 8 (eight) hours as needed (nausea). 15 tablet 0    sodium bicarbonate 650 MG tablet Take 2 tablets (1,300 mg total) by mouth 2 (two) times daily. 60 tablet 0     No facility-administered encounter medications on file as of 12/18/2023.         PHYSICAL EXAMINATION:    The patient generally appears in good health, is appropriately interactive, and is in no apparent distress.    Skin: No lesions.    Mental: Cooperative with normal affect.    Neuro: Grossly intact.    HEENT: Normal. No evidence of lymphadenopathy.    Chest:  normal inspiratory effort.    Abdomen: Soft, non-tender. No masses or organomegaly. Bladder is not palpable. No evidence of flank discomfort. No evidence of inguinal hernia.    Extremities: No clubbing, cyanosis, or edema    LABS:    Lab Results   Component Value Date    BUN 20 12/13/2023    CREATININE 1.2 12/13/2023       IMPRESSION:    Ureteroenteric anastomotic stricture on the left side.     PLAN:    1.  Will arrange to have the nephrostomy tube changed mid January  2.  Bowel prep instructions given  3.  Consent redone.   4.  Encouraged eating protein, walking in the yard  5.  Ferrous sulfate 325 mg I po bid prescribed her last hemoglobin was 11.0

## 2023-12-19 ENCOUNTER — TELEPHONE (OUTPATIENT)
Dept: UROLOGY | Facility: CLINIC | Age: 58
End: 2023-12-19

## 2023-12-19 NOTE — TELEPHONE ENCOUNTER
----- Message from Erica Pulido RN sent at 12/19/2023 11:38 AM CST -----  Surgery will be mid-Jan-Please schedule Juan Miguel tanesha

## 2023-12-20 ENCOUNTER — OFFICE VISIT (OUTPATIENT)
Dept: INFECTIOUS DISEASES | Facility: CLINIC | Age: 58
End: 2023-12-20
Payer: MEDICAID

## 2023-12-20 VITALS
HEART RATE: 88 BPM | SYSTOLIC BLOOD PRESSURE: 111 MMHG | HEIGHT: 69 IN | TEMPERATURE: 98 F | DIASTOLIC BLOOD PRESSURE: 74 MMHG | WEIGHT: 185.88 LBS | BODY MASS INDEX: 27.53 KG/M2

## 2023-12-20 DIAGNOSIS — N12 PYELONEPHRITIS: ICD-10-CM

## 2023-12-20 PROCEDURE — 99999 PR PBB SHADOW E&M-EST. PATIENT-LVL III: CPT | Mod: PBBFAC,,, | Performed by: STUDENT IN AN ORGANIZED HEALTH CARE EDUCATION/TRAINING PROGRAM

## 2023-12-20 PROCEDURE — 1159F PR MEDICATION LIST DOCUMENTED IN MEDICAL RECORD: ICD-10-PCS | Mod: CPTII,,, | Performed by: STUDENT IN AN ORGANIZED HEALTH CARE EDUCATION/TRAINING PROGRAM

## 2023-12-20 PROCEDURE — 3078F PR MOST RECENT DIASTOLIC BLOOD PRESSURE < 80 MM HG: ICD-10-PCS | Mod: CPTII,,, | Performed by: STUDENT IN AN ORGANIZED HEALTH CARE EDUCATION/TRAINING PROGRAM

## 2023-12-20 PROCEDURE — 3008F PR BODY MASS INDEX (BMI) DOCUMENTED: ICD-10-PCS | Mod: CPTII,,, | Performed by: STUDENT IN AN ORGANIZED HEALTH CARE EDUCATION/TRAINING PROGRAM

## 2023-12-20 PROCEDURE — 99213 OFFICE O/P EST LOW 20 MIN: CPT | Mod: PBBFAC | Performed by: STUDENT IN AN ORGANIZED HEALTH CARE EDUCATION/TRAINING PROGRAM

## 2023-12-20 PROCEDURE — 3008F BODY MASS INDEX DOCD: CPT | Mod: CPTII,,, | Performed by: STUDENT IN AN ORGANIZED HEALTH CARE EDUCATION/TRAINING PROGRAM

## 2023-12-20 PROCEDURE — 3074F SYST BP LT 130 MM HG: CPT | Mod: CPTII,,, | Performed by: STUDENT IN AN ORGANIZED HEALTH CARE EDUCATION/TRAINING PROGRAM

## 2023-12-20 PROCEDURE — 1111F PR DISCHARGE MEDS RECONCILED W/ CURRENT OUTPATIENT MED LIST: ICD-10-PCS | Mod: CPTII,,, | Performed by: STUDENT IN AN ORGANIZED HEALTH CARE EDUCATION/TRAINING PROGRAM

## 2023-12-20 PROCEDURE — 99999 PR PBB SHADOW E&M-EST. PATIENT-LVL III: ICD-10-PCS | Mod: PBBFAC,,, | Performed by: STUDENT IN AN ORGANIZED HEALTH CARE EDUCATION/TRAINING PROGRAM

## 2023-12-20 PROCEDURE — 99214 OFFICE O/P EST MOD 30 MIN: CPT | Mod: S$PBB,,, | Performed by: STUDENT IN AN ORGANIZED HEALTH CARE EDUCATION/TRAINING PROGRAM

## 2023-12-20 PROCEDURE — 1111F DSCHRG MED/CURRENT MED MERGE: CPT | Mod: CPTII,,, | Performed by: STUDENT IN AN ORGANIZED HEALTH CARE EDUCATION/TRAINING PROGRAM

## 2023-12-20 PROCEDURE — 1159F MED LIST DOCD IN RCRD: CPT | Mod: CPTII,,, | Performed by: STUDENT IN AN ORGANIZED HEALTH CARE EDUCATION/TRAINING PROGRAM

## 2023-12-20 PROCEDURE — 3078F DIAST BP <80 MM HG: CPT | Mod: CPTII,,, | Performed by: STUDENT IN AN ORGANIZED HEALTH CARE EDUCATION/TRAINING PROGRAM

## 2023-12-20 PROCEDURE — 99214 PR OFFICE/OUTPT VISIT, EST, LEVL IV, 30-39 MIN: ICD-10-PCS | Mod: S$PBB,,, | Performed by: STUDENT IN AN ORGANIZED HEALTH CARE EDUCATION/TRAINING PROGRAM

## 2023-12-20 PROCEDURE — 3074F PR MOST RECENT SYSTOLIC BLOOD PRESSURE < 130 MM HG: ICD-10-PCS | Mod: CPTII,,, | Performed by: STUDENT IN AN ORGANIZED HEALTH CARE EDUCATION/TRAINING PROGRAM

## 2023-12-20 NOTE — PROGRESS NOTES
Infectious Diseases Progress Note  Subjective:     Chief Complaint: hospital follow up for UTI    HPI: Edita Riley is a 58 y.o. female with history of cervical CA s/p chemotherapy and XRT c/b bilateral ureteral strictures requiring bilateral nephrostomy tubes (now only with left tube; right removed 4/2023), s/p urinary to colon conduit 2020 c/b bowel perforation s/p right hemicolectomy and ostomy and recurrent UTIs who was admitted 12/7 with sepsis and UTI, culture with klebsiella pneumoniae. Blood cultures were negative. She received 5 days of cefepime and 1 dose of tobramycin at discharge.     Today she feels well. Her usual UTI symptoms are weakness, chills, fever, back pain. No symptoms currently. Her nephrostomy tube is draining well today. She is eager to have her ileostomy revision next month. She normally goes to the ED when she begins to have symptoms and she is agreeable to doing that in the future if she becomes sick again. She has not taken any antibiotics since discharge.        Immunization History   Administered Date(s) Administered    Influenza - Quadrivalent - PF *Preferred* (6 months and older) 11/16/2017, 09/19/2019    Tdap 03/28/2017        Review of Systems   Constitutional: Positive for decreased appetite, malaise/fatigue and weight gain. Negative for fever.   HENT:  Positive for hoarse voice.    Respiratory:  Positive for cough.    Gastrointestinal:  Positive for nausea.   Neurological:  Positive for headaches.   All other systems reviewed and are negative.       Past Medical History:   Diagnosis Date    Abnormal mammogram 08/25/2020    Abnormal mammogram 08/25/2020    Acute blood loss anemia     Acute deep vein thrombosis (DVT) of lower extremity 12/09/2020    Advance care planning 04/30/2021    ODESSA (acute kidney injury) 03/21/2020    Anemia due to chronic blood loss     Anemia due to chronic kidney disease     Anemia due to chronic kidney disease 05/18/2020    Anxiety     Bilateral  ureteral obstruction 09/11/2020    Bilious vomiting with nausea 06/11/2023    Cardiovascular event risk -- low 09/14/2015    ASCVD 10-year risk 1.9% (with optimal risk factors 1.3%) as of 9/14/2015     Cervical cancer 2014    Chronic back pain     Colostomy care     Deep vein thrombosis     Depression     Diarrhea due to malabsorption 11/14/2018    Difficult intubation     Discharge planning issues 04/30/2021    Disorder of kidney and ureter     DVT of lower extremity, bilateral 11/04/2020    Edema 04/10/2023    Emphysema of lung 04/10/2023    Fibromyalgia     Fungemia 09/27/2020    Generalized abdominal pain 08/25/2020    GERD (gastroesophageal reflux disease)     Hemifacial spasm 09/16/2015    Hiatal hernia 2014    History of cervical cancer 10/11/2018    History of essential hypertension 05/04/2015    Not requiring medications at this time  BP is low- concern that this may correlate with increased stool output from stoma. Encourage her to contact GI and her PCP.    Hx of psychiatric care     Cymbalta, trazodone    Hypertension     Hypomagnesemia 11/21/2018    Hyponatremia 09/10/2023    Impaired functional mobility, balance, gait, and endurance 07/24/2015    Lactose intolerance     Major depressive disorder, recurrent episode, moderate 06/02/2023    Metastatic squamous cell carcinoma to lymph node 10/11/2018    Moderate episode of recurrent major depressive disorder 04/21/2021    Nephrostomy complication 10/26/2023    Neuropathy due to chemotherapeutic drug 11/14/2018    Osteoarthritis of back     Peritonitis 09/22/2020    Physical deconditioning 04/30/2021    Pseudomonas urinary tract infection 04/21/2021    Psychiatric problem     Pyelitis 09/09/2023    QT prolongation 04/16/2021    Refusal of blood transfusions as patient is [employer]     Schatzki's ring 09/14/2015    Seen on outside EGD 05/2014, underwent esophageal dilatation. Bx were negative.     Seizure-like activity 12/02/2018    Seizures      Sleep stage dysfunction     Noted on PSG 06/2017; negative for obstructive sleep apnea     Stroke     Urinary tract infection associated with nephrostomy catheter 06/11/2020    Wound infection after surgery 09/24/2020     Past Surgical History:   Procedure Laterality Date    ANTEGRADE NEPHROSTOGRAPHY Bilateral 9/28/2020    Procedure: Nephrostogram - antegrade;  Surgeon: Leslie Balbuena MD;  Location: [facility] OR 1ST FLR;  Service: Urology;  Laterality: Bilateral;    ANTEGRADE NEPHROSTOGRAPHY Left 4/20/2021    Procedure: NEPHROSTOGRAM, ANTEGRADE;  Surgeon: Leslie Balbuena MD;  Location: [facility] OR 1ST FLR;  Service: Urology;  Laterality: Left;    ANTEGRADE NEPHROSTOGRAPHY Right 10/27/2022    Procedure: NEPHROSTOGRAM, ANTEGRADE;  Surgeon: Chirag Russ MD;  Location: [facility] OR 1ST FLR;  Service: Urology;  Laterality: Right;    ANTEGRADE NEPHROSTOGRAPHY Right 4/21/2023    Procedure: NEPHROSTOGRAM, ANTEGRADE;  Surgeon: Chirag Russ MD;  Location: [facility] OR 2ND FLR;  Service: Urology;  Laterality: Right;    ANTEGRADE NEPHROSTOGRAPHY Left 6/6/2023    Procedure: Nephrostogram - antegrade;  Surgeon: Leslie Balbuena MD;  Location: [facility] OR 1ST FLR;  Service: Urology;  Laterality: Left;    BILATERAL OOPHORECTOMY  2015    CHOLECYSTECTOMY  11/09/2016    Done at [facility], path showed chronic cholecystitis and gallstones    cold knife conization  2014    COLECTOMY, RIGHT  9/17/2020    Procedure: COLECTOMY, RIGHT Extended;  Surgeon: Hammad Reynolds MD;  Location: [facility] OR 2ND FLR;  Service: Colon and Rectal;;    COLONOSCOPY  2014    [facility]    COLONOSCOPY N/A 5/18/2018    Procedure: COLONOSCOPY;  Surgeon: Arden Gutiérrez MD;  Location: [facility] ENDO (4TH FLR);  Service: Endoscopy;  Laterality: N/A;    COLONOSCOPY N/A 7/28/2020    Procedure: COLONOSCOPY;  Surgeon: Hammad Reynolds MD;  Location: [facility] ENDO (4TH FLR);  Service: Colon and Rectal;  Laterality: N/A;  covid test Mobile 7/25     CYSTOSCOPY WITH URETEROSCOPY, RETROGRADE PYELOGRAPHY, AND INSERTION OF STENT Bilateral 3/21/2020    Procedure: CYSTOSCOPY, WITH RETROGRADE PYELOGRAM,;  Surgeon: Leslie Balbuena MD;  Location: [facility] OR 1ST FLR;  Service: Urology;  Laterality: Bilateral;    DILATION OF NEPHROSTOMY TRACT Right 10/27/2022    Procedure: DILATION, NEPHROSTOMY TRACT;  Surgeon: Chirag Russ MD;  Location: [facility] OR 1ST FLR;  Service: Urology;  Laterality: Right;    ESOPHAGOGASTRODUODENOSCOPY  2014    ESOPHAGOGASTRODUODENOSCOPY  11/18/2020    ESOPHAGOGASTRODUODENOSCOPY N/A 11/18/2020    Procedure: ESOPHAGOGASTRODUODENOSCOPY (EGD);  Surgeon: Zenon Spencer MD;  Location: [facility];  Service: Endoscopy;  Laterality: N/A;    ESOPHAGOGASTRODUODENOSCOPY N/A 12/11/2020    Procedure: EGD (ESOPHAGOGASTRODUODENOSCOPY);  Surgeon: Juancho Muse MD;  Location: [facility] (2ND FLR);  Service: Endoscopy;  Laterality: N/A;    HYSTERECTOMY  2014    [facility] for cervical cancer    ILEOSTOMY  9/17/2020    Procedure: CREATION, ILEOSTOMY;  Surgeon: Hammad Reynolds MD;  Location: [facility] OR 2ND FLR;  Service: Colon and Rectal;;    LOOPOGRAM N/A 4/20/2021    Procedure: LOOPOGRAM;  Surgeon: Leslie Balbuena MD;  Location: [facility] OR 1ST FLR;  Service: Urology;  Laterality: N/A;    LOOPOGRAM N/A 6/6/2023    Procedure: LOOPOGRAM;  Surgeon: Leslie Balbuena MD;  Location: [facility] OR 1ST FLR;  Service: Urology;  Laterality: N/A;    MOBILIZATION OF SPLENIC FLEXURE N/A 9/11/2020    Procedure: MOBILIZATION, COLONIC;  Surgeon: Hammad Reynolds MD;  Location: [facility] OR 2ND FLR;  Service: Colon and Rectal;  Laterality: N/A;    NEPHROSTOGRAPHY Bilateral 4/17/2021    Procedure: Nephrostogram;  Surgeon: Celeste Surgeon;  Location: [facility]STE;  Service: Anesthesiology;  Laterality: Bilateral;    OOPHORECTOMY      PERCUTANEOUS NEPHROLITHOTOMY Right 4/21/2023    Procedure: NEPHROLITHOTOMY, PERCUTANEOUS;  Surgeon: Chirag Russ MD;  Location: [facility] OR [address] [place] [address];  Service: Urology;   Laterality: Right;  2.5 hrs    PERCUTANEOUS NEPHROSTOMY  4/21/2023    Procedure: CREATION, NEPHROSTOMY, PERCUTANEOUS with removal of existing nephrostomy tube;  Surgeon: Chirag Russ MD;  [facility];  Service: Urology;;    PYELOSCOPY Right 10/27/2022    Procedure: PYELOSCOPY;  Surgeon: Chirag Russ MD;  [facility];  Service: Urology;  Laterality: Right;    REPLACEMENT OF NEPHROSTOMY TUBE Right 10/27/2022    Procedure: REPLACEMENT, NEPHROSTOMY TUBE;  Surgeon: Chirag Russ MD;  Location: [facility] OR [address] [place] [address];  Service: Urology;  Laterality: Right;    RETROPERITONEAL LYMPHADENECTOMY Right 9/17/2018    Procedure: LYMPHADENECTOMY, RETROPERITONEUM;  Surgeon: Sebas Reed MD;  Location: [facility] OR [address] [place] [address];  Service: General;  Laterality: Right;  ILIAC    URETEROSCOPIC REMOVAL OF URETERIC CALCULUS Right 10/27/2022    Procedure: REMOVAL, CALCULUS, URETER, URETEROSCOPIC;  Surgeon: Chirag Russ MD;  Location: [facility] OR [address] [place] [address];  Service: Urology;  Laterality: Right;    URETEROSCOPY Right 10/27/2022    Procedure: URETEROSCOPY-ANTEGRADE;  Surgeon: Chirag Russ MD;  Location: [facility] OR [address] [place] [address];  Service: Urology;  Laterality: Right;     Family History   Problem Relation Age of Onset    Heart disease Sister     Heart disease Maternal Grandmother     Colon cancer Father     Esophageal cancer Father     Cancer Father         Lung-smoker    Cancer Mother         Cervical    Cervical cancer Mother     Breast cancer Maternal Aunt     Suicide Daughter         jumped from parking structure    Drug abuse Daughter     Drug abuse Daughter     Rectal cancer Neg Hx     Stomach cancer Neg Hx     Crohn's disease Neg Hx     Ulcerative colitis Neg Hx     Diabetes Neg Hx     Hypertension Neg Hx      Social History     Tobacco Use    Smoking status: Never    Smokeless tobacco: Never   Substance Use Topics    Alcohol use: No     Alcohol/week: 0.0 standard drinks of alcohol    Drug use: No        Objective:     Physical Exam  Vitals and nursing note reviewed.   Constitutional:       General: She is not in acute distress.     Appearance: Normal appearance.   Pulmonary:      Effort: Pulmonary effort is normal. No respiratory distress.   Abdominal:      General: There is no distension.      Palpations: Abdomen is soft.      Tenderness: There is no abdominal tenderness.      Comments: Ostomy to R abdomen, urostomy to L side  L sided percutaneous nephrostomy tube, non-tender   Skin:     General: Skin is warm and dry.   Neurological:      General: No focal deficit present.      Mental Status: She is alert and oriented to person, place, and time.   Psychiatric:         Mood and Affect: Mood normal.         Data:    All data, including recent labs, radiology, and pathology, has been independently reviewed.    Labs:    Recent Labs   Lab Result Units 11/05/23  0436 11/06/23  0511 12/07/23  1835 12/07/23  2027 12/08/23  0325 12/11/23  0828 12/12/23  0500 12/12/23  1333 12/13/23  0420   WBC K/uL 6.30 6.56   < >  --    < > 5.75 6.64  --  6.96   Hemoglobin g/dL 12.3 11.0*   < >  --    < > 10.9* 11.4*  --  11.0*   POC Hematocrit %PCV  --   --    < >  --   --   --   --  43  --    Hematocrit % 40.7 36.9*   < >  --    < > 34.7* 35.8*  --  34.6*   Sodium mmol/L 143 140  --  137   < > 139 139  --  138   Potassium mmol/L 3.7 3.6  --  4.4   < > 3.6 3.6  --  3.4*   Chloride mmol/L 113* 109  --  116*   < > 113* 112*  --  108   BUN mg/dL 20 14  --  37*   < > 18 18  --  20   Creatinine mg/dL 1.4 1.3  --  1.6*   < > 1.3 1.4  --  1.2   AST U/L 21 17  --  33  --   --   --   --   --    ALT U/L 19 17  --  35  --   --   --   --   --    Alkaline Phosphatase U/L 124 111  --  141*  --   --   --   --   --    Total Bilirubin mg/dL 0.3 0.3  --  0.5  --   --   --   --   --     < > = values in this interval not displayed.        Radiology:    No results found in the last 24 hours.     Assessment:  Edita Rilye is a 58 y.o. female  with history of cervical CA s/p chemotherapy and XRT c/b bilateral ureteral strictures requiring bilateral nephrostomy tubes (now only with left tube; right removed 4/2023), s/p urinary to colon conduit 2020 c/b bowel perforation s/p right hemicolectomy and ostomy and recurrent UTIs who was admitted 12/7 with sepsis and UTI, culture with klebsiella pneumoniae. Blood cultures were negative. She received 5 days of cefepime and 1 dose of tobramycin at discharge.     Klebsiella pneumoniae urinary tract infection: completed treatment while admitted. Doing well off antibiotics today.   - no further antibiotics unless she has recurrence    No need for follow up, return PRN    The total time for evaluation and management services performed on 12/20/23 was greater than 30 minutes.     Charito Arroyo MD  Infectious Disease

## 2024-01-02 DIAGNOSIS — Z93.6 NEPHROSTOMY STATUS: ICD-10-CM

## 2024-01-02 DIAGNOSIS — N13.1 HYDRONEPHROSIS WITH URETERAL STRICTURE: Primary | ICD-10-CM

## 2024-01-08 ENCOUNTER — TELEPHONE (OUTPATIENT)
Dept: PREADMISSION TESTING | Facility: HOSPITAL | Age: 59
End: 2024-01-08
Payer: MEDICAID

## 2024-01-18 ENCOUNTER — TELEPHONE (OUTPATIENT)
Dept: UROLOGY | Facility: CLINIC | Age: 59
End: 2024-01-18
Payer: MEDICAID

## 2024-01-19 ENCOUNTER — TELEPHONE (OUTPATIENT)
Dept: PREADMISSION TESTING | Facility: HOSPITAL | Age: 59
End: 2024-01-19
Payer: MEDICAID

## 2024-01-19 NOTE — TELEPHONE ENCOUNTER
The patient states that someone called her but she was not sure who it was.  They wanted to know when she was scheduled for surgery.  Discussed she is scheduled for the 6th of February.     Asked if she has been scheduled for IR to change the perc nephrostomy.  She has not.      Will contact them tomorrow to make sure she is scheduled for exchange ahead of her surgery.

## 2024-01-19 NOTE — TELEPHONE ENCOUNTER
----- Message from Alison Pena sent at 1/17/2024  3:38 PM CST -----  Regarding: Pt Advice  Contact: [phone]  Pt requesting a call back to discuss plan of care  for surgery please call [phone]

## 2024-01-25 ENCOUNTER — TELEPHONE (OUTPATIENT)
Dept: SURGERY | Facility: CLINIC | Age: 59
End: 2024-01-25
Payer: MEDICAID

## 2024-01-25 NOTE — TELEPHONE ENCOUNTER
Spoke with patient reviewed pre op instructions, verified address on file and will mail pre op instructions per request.

## 2024-01-26 ENCOUNTER — TELEPHONE (OUTPATIENT)
Dept: PREADMISSION TESTING | Facility: HOSPITAL | Age: 59
End: 2024-01-26
Payer: MEDICAID

## 2024-01-29 ENCOUNTER — OFFICE VISIT (OUTPATIENT)
Dept: INTERNAL MEDICINE | Facility: CLINIC | Age: 59
End: 2024-01-29
Payer: MEDICAID

## 2024-01-29 VITALS
WEIGHT: 182 LBS | HEIGHT: 69 IN | BODY MASS INDEX: 26.96 KG/M2 | OXYGEN SATURATION: 98 % | SYSTOLIC BLOOD PRESSURE: 115 MMHG | DIASTOLIC BLOOD PRESSURE: 74 MMHG | HEART RATE: 92 BPM | RESPIRATION RATE: 16 BRPM

## 2024-01-29 DIAGNOSIS — G56.01 CARPAL TUNNEL SYNDROME ON RIGHT: ICD-10-CM

## 2024-01-29 DIAGNOSIS — Z80.0 FAMILY HISTORY OF COLON CANCER: ICD-10-CM

## 2024-01-29 DIAGNOSIS — C53.9 MALIGNANT NEOPLASM OF CERVIX, UNSPECIFIED SITE: ICD-10-CM

## 2024-01-29 DIAGNOSIS — T45.1X5A NEUROPATHY DUE TO CHEMOTHERAPEUTIC DRUG: ICD-10-CM

## 2024-01-29 DIAGNOSIS — M47.9 OSTEOARTHRITIS OF BACK: Chronic | ICD-10-CM

## 2024-01-29 DIAGNOSIS — Z78.9 DIFFICULT INTRAVENOUS ACCESS: ICD-10-CM

## 2024-01-29 DIAGNOSIS — I10 ESSENTIAL HYPERTENSION: ICD-10-CM

## 2024-01-29 DIAGNOSIS — Z53.1 REFUSAL OF BLOOD TRANSFUSIONS AS PATIENT IS JEHOVAH'S WITNESS: ICD-10-CM

## 2024-01-29 DIAGNOSIS — Z86.73 HISTORY OF STROKE: ICD-10-CM

## 2024-01-29 DIAGNOSIS — Z86.718 HISTORY OF DVT (DEEP VEIN THROMBOSIS): Chronic | ICD-10-CM

## 2024-01-29 DIAGNOSIS — T88.4XXD HARD TO INTUBATE, SUBSEQUENT ENCOUNTER: ICD-10-CM

## 2024-01-29 DIAGNOSIS — J30.1 ALLERGIC RHINITIS DUE TO POLLEN, UNSPECIFIED SEASONALITY: ICD-10-CM

## 2024-01-29 DIAGNOSIS — G43.009 MIGRAINE WITHOUT AURA AND WITHOUT STATUS MIGRAINOSUS, NOT INTRACTABLE: ICD-10-CM

## 2024-01-29 DIAGNOSIS — Z86.59 HISTORY OF SUICIDAL IDEATION: ICD-10-CM

## 2024-01-29 DIAGNOSIS — N18.30 STAGE 3 CHRONIC KIDNEY DISEASE, UNSPECIFIED WHETHER STAGE 3A OR 3B CKD: ICD-10-CM

## 2024-01-29 DIAGNOSIS — K64.8 INTERNAL HEMORRHOIDS: ICD-10-CM

## 2024-01-29 DIAGNOSIS — Z01.818 PREOP EXAMINATION: Primary | ICD-10-CM

## 2024-01-29 DIAGNOSIS — K21.00 GASTROESOPHAGEAL REFLUX DISEASE WITH ESOPHAGITIS, UNSPECIFIED WHETHER HEMORRHAGE: ICD-10-CM

## 2024-01-29 DIAGNOSIS — G62.0 NEUROPATHY DUE TO CHEMOTHERAPEUTIC DRUG: ICD-10-CM

## 2024-01-29 DIAGNOSIS — D63.1 ANEMIA IN CHRONIC KIDNEY DISEASE, UNSPECIFIED CKD STAGE: ICD-10-CM

## 2024-01-29 DIAGNOSIS — N18.9 ANEMIA IN CHRONIC KIDNEY DISEASE, UNSPECIFIED CKD STAGE: ICD-10-CM

## 2024-01-29 PROBLEM — N12 PYELONEPHRITIS: Status: RESOLVED | Noted: 2020-06-11 | Resolved: 2024-01-29

## 2024-01-29 PROBLEM — E87.29 HIGH ANION GAP METABOLIC ACIDOSIS: Status: RESOLVED | Noted: 2021-04-17 | Resolved: 2024-01-29

## 2024-01-29 PROCEDURE — 3074F SYST BP LT 130 MM HG: CPT | Mod: CPTII,,, | Performed by: HOSPITALIST

## 2024-01-29 PROCEDURE — 1160F RVW MEDS BY RX/DR IN RCRD: CPT | Mod: CPTII,,, | Performed by: HOSPITALIST

## 2024-01-29 PROCEDURE — 3008F BODY MASS INDEX DOCD: CPT | Mod: CPTII,,, | Performed by: HOSPITALIST

## 2024-01-29 PROCEDURE — 99213 OFFICE O/P EST LOW 20 MIN: CPT | Mod: PBBFAC | Performed by: HOSPITALIST

## 2024-01-29 PROCEDURE — 1159F MED LIST DOCD IN RCRD: CPT | Mod: CPTII,,, | Performed by: HOSPITALIST

## 2024-01-29 PROCEDURE — 99999 PR PBB SHADOW E&M-EST. PATIENT-LVL III: CPT | Mod: PBBFAC,,, | Performed by: HOSPITALIST

## 2024-01-29 PROCEDURE — 99215 OFFICE O/P EST HI 40 MIN: CPT | Mod: S$PBB,,, | Performed by: HOSPITALIST

## 2024-01-29 PROCEDURE — 3078F DIAST BP <80 MM HG: CPT | Mod: CPTII,,, | Performed by: HOSPITALIST

## 2024-01-29 NOTE — HPI
History of present illness- I had the pleasure of meeting this pleasant 59 y.o. lady in the pre op clinic prior to her elective Urological surgery. The patient is new to me .   I have called her  to have him on the phone during the consultation  I have obtained the history by speaking to the patient and by reviewing the electronic health records.    Events leading up to surgery / History of presenting illness -    Small bowel anastomotic stricture     Cervical CA s/p chemotherapy and XRT c/b bilateral ureteral strictures requiring bilateral nephrostomy tubes , s/p urinary to colon conduit 2020 c/b bowel perforation s/p right hemicolectomy and ostomy and recurrent UTIs    She has an ileostomy and a left percutaneous nephrostomy that was last exchanged     He, having had urinary tract infection in the past, does not feel like she has urinary tract infection at this time     She has longstanding abdominal pain which is not new    She is able to eat, 3 meals a day and and is able to maintain her nutrition      She does not seem to be taking any medication for the abdominal pain and she drinks a soft drink that helps with her abdominal pain    Relevant health conditions of significance for the perioperative period/ History of presenting illness -    Health conditions of significance for the perioperative period      Allergies  Anxiety  [employer]  Migraine  CKD 3  History of DVT   Hypertension   GERD    She lives with her  in a single-level house   She is homemaker   Her  has retired and is able to help her after surgery  She had 2 biological children who passed in 2023 and 2018 and she has recovered from her loss

## 2024-01-29 NOTE — PROGRESS NOTES
[facility]  Progress Note    Patient Name: Edita Riley  MRN: [id number]  Date of Evaluation- 01/29/2024  PCP- Trina Vu MD    Future cases for Edita Riley [[id number]]       Case ID Status Date Time Yeison Procedure Provider Location    [id number] [facility] 2/6/2024  7:00  REVISION, ANASTOMOSIS, URETEROILEAL Leslie Balbuena MD [[id number]] [facility] OR 2ND FLR            HPI:  History of present illness- I had the pleasure of meeting this pleasant 59 y.o. lady in the pre op clinic prior to her elective Urological surgery. The patient is new to me .   I have called her  to have him on the phone during the consultation  I have obtained the history by speaking to the patient and by reviewing the electronic health records.    Events leading up to surgery / History of presenting illness -    Small bowel anastomotic stricture     Cervical CA s/p chemotherapy and XRT c/b bilateral ureteral strictures requiring bilateral nephrostomy tubes , s/p urinary to colon conduit 2020 c/b bowel perforation s/p right hemicolectomy and ostomy and recurrent UTIs    She has an ileostomy and a left percutaneous nephrostomy that was last exchanged     He, having had urinary tract infection in the past, does not feel like she has urinary tract infection at this time     She has longstanding abdominal pain which is not new    She is able to eat, 3 meals a day and and is able to maintain her nutrition      She does not seem to be taking any medication for the abdominal pain and she drinks a soft drink that helps with her abdominal pain    Relevant health conditions of significance for the perioperative period/ History of presenting illness -    Health conditions of significance for the perioperative period      Allergies  Anxiety  Uatsdin  Migraine  CKD 3  History of DVT   Hypertension   GERD    She lives with her  in a single-level house   She is homemaker   Her  has retired  "and is able to help her after surgery  She had 2 biological children who passed in 2023 and 2018 and she has recovered from her loss             Subjective/ Objective:     Chief complaint-Preoperative evaluation, Perioperative Medical management, complication reduction plan   She has not known to have heart problems    Active cardiac conditions- none    Revised cardiac risk index predictors- high-risk type of surgery    Functional capacity -Examples of physical activity  , she is limited with physical activity need to address her bags, can take 1 flight of stairs----- She can undertake all the above activities without  chest pain,chest tightness, Shortness of breath ,dizziness,lightheadedness making her exercise tolerance more,  than 4 Mets.         Review of Systems   Constitutional:  Negative for chills and fever.        No unusual weight changes     HENT:          RODRIGUEZG score  / 8        Age over 50    Eyes:         No sudden visual changes  She wears reading glasses   Respiratory:          No cough , phlegm  Cough with phlegm   No change in color , consistency, amount of phlegm   Dry cough   No Hemoptysis   Cardiovascular:         As noted   Gastrointestinal:         No overt GI/ blood losses  Bowel movements- Regular   Endocrine:        Prednisone use > 20 mg daily for 3 weeks- none   Genitourinary:         No urinary hesitancy    Musculoskeletal:         As above   No unusual, muscle, joint pains   Skin:  Negative for rash.   Neurological:  Negative for syncope.        No unilateral weakness   Hematological:         Current use of Anticoagulants  none   Psychiatric/Behavioral:            No SI/HI   No vascular stenting            No anesthesia, bleeding, cardiac problems, PONV with previous surgeries/procedures.  Medications and Allergies reviewed in epic.   FH- No anesthesia,bleeding / venous thrombosis , in family      Physical Exam  Blood pressure 115/74, pulse 92, resp. rate 16, height 5' 9" (1.753 " m), weight 82.6 kg (182 lb), last menstrual period 06/08/2014, SpO2 98 %.    No fever  No falls , gall bladder problems    I offered a sheetand the presence of a chaperone during physical examination   She was comfortable to proceed with the exam without the the presence of a chaperone        Physical Exam  Constitutional- Vitals - Body mass index is 26.88 kg/m².,   Vitals:    01/29/24 0944   BP: 115/74   Pulse: 92   Resp: 16     General appearance-Conscious,Coherent  Eyes- No conjunctival icterus,pupils  round  and reactive to light   ENT-Oral cavity- moist    , Hearing grossly normal   Neck- No thyromegaly ,Trachea -central, No jugular venous distension,   No Carotid Bruit   Cardiovascular -Heart Sounds- Normal  and  no murmur   , No gallop rhythm   Respiratory - Normal Respiratory Effort, Normal breath sounds,  no wheeze , and  no forced expiratory wheeze    Peripheral pitting pedal edema-- none , no calf pain   Gastrointestinal -Soft abdomen, No palpable masses, Non Tender,Liver,Spleen not palpable. No-- free fluid and shifting dullness  Musculoskeletal- No finger Clubbing. Strength grossly normal   Lymphatic-No Palpable cervical, axillary,Inguinal lymphadenopathy   Psychiatric - normal effect,Orientation  Rt Dorsalis pedis pulses-palpable    Lt Dorsalis pedis pulses- palpable   Rt Posterior tibial pulses -palpable   Left posterior tibial pulses -palpable   Miscellaneous -  no renal bruit  Her ostomy drainage was red that she attributes to the red Gatorade she drank early   Investigations  Lab and Imaging have been reviewed in [facility].    Review of Medicine tests    EKG- I had independently reviewed the EKG from--12/8/2024  It was reported to be showing     Sinus tachycardia   Biatrial enlargement   Right superior axis deviation   Right ventricular hypertrophy   Abnormal ECG   When compared with ECG of 03-NOV-2023 20:57,   Vent. rate has increased BY  39 BPM   Questionable change in The axis     Oct 2022    The  left ventricle is normal in size with mild concentric hypertrophy and normal systolic function.  The estimated ejection fraction is 70%.  Normal left ventricular diastolic function.  Normal right ventricular size with normal right ventricular systolic function.  Mild mitral regurgitation.  Normal central venous pressure (3 mmHg).  The estimated PA systolic pressure is 16 mmHg.  Oct 2022       1. Scintigraphically negative for ischemia or infarct.  2. The global left ventricular systolic function is normal with an LV ejection fraction of greater than 75 % and no evidence of LV dilatation. Wall motion is normal.      Review of clinical lab tests:  Lab Results   Component Value Date    CREATININE 1.2 12/13/2023    HGB 11.0 (L) 12/13/2023     12/13/2023             Review of old records- Was done and information gathered regards to events leading to surgery and health conditions of significance in the perioperative period.        Preoperative cardiac risk assessment-  The patient does not have any active cardiac conditions . Revised cardiac risk index predictors- 1---.Functional capacity is more than 4 Mets. She will be undergoing a open abdominal procedure that carries a High Risk risk     Risk of a major Cardiac event ( Defined as death, myocardial infarction, or cardiac arrest at 30 days after noncardiac surgery), based on RCRI score     Her cardiac testing from 2022 is reassuring  She also had multiple procedures in the past with no problems around the time of surgery from a heart standpoint  No further cardiac work up is indicated prior to proceeding with the surgery          American Society of Anesthesiologists Physical status classification ( ASA ) class: 3     Postoperative pulmonary complication risk assessment:      ARISCAT ( Canet) risk index- risk class -  Low, if duration of surgery is under 2 hours, intermediate, if duration of surgery is over 2 hours       Assessment/Plan:     Carpal tunnel  syndrome on right  She is doing good    Migraine without aura and without status migrainosus, not intractable  She is doing good from a migraine standpoint  She is not known to have history suggestive of PFO/ASD    Neuropathy due to chemotherapeutic drug  She had chemotherapy for cervical cancer about 2021 and she has currently not having any tingling numbness of the hands and feet    Osteoarthritis of back  She has no leg weakness    History of suicidal ideation  She has currently not having any suicidal ideation  She is currently taking Remeron  She has not been taking the Remeron as she does not feel depressed    Allergic rhinitis due to pollen  She is doing good from an allergy standpoint    Hard to intubate   I suggest that the perioperative team be aware of this so that appropriatecare can be taken     Essential hypertension  She has currently not on any medication for hypertension  Hypertension-  Blood pressure is acceptable . I suggest  blood pressure monitoring.I suggest addressing pain control as uncontrolled pain can increased blood pressure      CKD (chronic kidney disease), stage III  Her most recent kidney function from December was closer to her better creatinines that she has had in the past   I went back on the previous creatinines and have noticed that her kidney function has been fluctuating  Off note she has recurrent urinary tract infection  She has currently not taking anti-inflammatory medication    Stages of CKD discussed  Deleterious effects NSAID's , Beneficial effects of Hydration discussed   Tylenol as needed for pain     I  suggest monitoring renal function, in put and out put status socorro-operatively. I  suggest avoiding nephrotoxic medication including NSAIDs, COX2 inhibitors, intravenous contrast agent,avoiding hypotension to prevent further renal impairment.      History of DVT (deep vein thrombosis)  She had blood clot in both of her legs in her vein in the postoperative setting  while she was in the rehab  Her blood clot at that time seems to have been a provoked blood clot from reduced mobility in the postoperative setting in the rehab setting    History of DVT  NoPE  1 Episode  Associated with reduced mobility, no long journeys, no cancer, prior surgery, Hospital stay, noHRT  No IVC filter      She had no recurrence of blood clot  Increased risk of thrombosis in the socorro operative period , compression stocking use discussed     Anemia in chronic kidney disease  Her hemoglobin is about 11 and she does not have any visible blood losses apart from the blood in the urine when the nephrostomy tube gets changed    Cervical cancer  She was cervical cancer in 2015 and as per the history obtained she had not require any treatment until 2018 when she had lymph node surgery and had chemo and radiation  She currently does not have any recurrence of cancer      Family history of colon cancer  She gets colonoscopies  Suggested follow up    Gastro-esophageal reflux disease with esophagitis  Doing good     Does not sound Cardiac     I suggest aspiration precautions       Internal hemorrhoids  She has not troubled with the hemorrhoids currently    Refusal of blood transfusions as patient is Rastafarian  Advent  I suggest that the perioperative team be aware of this so that appropriate preventive care can be taken      Difficult intravenous access  Because of chemotherapy    History of stroke  She had stroke when she was age 7 per the history that I have obtained from her   She had right-sided facial weakness  ' she has no longer having any right-sided weakness  She has no longer have any right-sided facial drooping  Given the young age she was felt to have stroke, I wonder if she had Bell's palsy    Unsure if it is hemorrhagic or ischemic   To her understanding she may have had a stroke and she was age 11    2018  Unremarkable MRA of the intracranial circulation.         Preventive  perioperative care    Thromboembolic prophylaxis:  Her risk factors for thrombosis include surgical procedure, previous history of thrombosis, and age.I suggest  thromboembolic prophylaxis ( mechanical/pharmacological, weighing the risk benefits of pharmacological agent use considering socorro procedural bleeding )  during the perioperative period.I suggested being active in the post operative period.      Postoperative pulmonary complication prophylaxis-Risk factors for post operative pulmonary complications include surgery lasting over 3 hours and proximity of the surgical site to the lungs- I suggest incentive spirometry use, early ambulation, end tidal carbon dioxide monitoring, and pain control so as to avoid diaphragmatic splinting  , oral care , head end of bed elevation      Renal complication prophylaxis-Risk factors for renal complications include pre-existing renal disease . I suggest keeping her well hydrated and avoidance/ minimizing the use of  NSAID's,JACOBSON 2 Inhibitors ,IV contrast if possible in the perioperative period.     Surgical site Infection Prophylaxis-I  suggest appropriate antibiotic for Prophylaxis against Surgical site infections  No reported Staph infection  Skin antibacterial discussed         This visit was focused on Preoperative evaluation, Perioperative Medical management, complication reduction plans. I suggest that the patient follows up with primary care or relevant sub specialists for ongoing health care.    I appreciate the opportunity to be involved in this patients care. Please feel free to contact me if there were any questions about this consultation.    Patient is optimized    Patient/ care giver/ Family member was instructed to call and update me about any changes to health,  medication, office visits ,testing out side of the socorro operative care center , hospitalizations between now and surgery      Sofie Driver MD  Internal Medicine  [facility]   Cell  Phone- [phone]    History of COVID - no   COVID vaccination status -none    COVID screening     No fever   No cough   No SOB  No sore throat   No loss of taste or smell   No muscle aches   No nausea, vomiting , diarrhea     Her chart indicates emphysema but she never smoked tobacco and she does not seem to be trouble with shortness of breath   Her blood test while she was hospitalized showed mildly low potassium but she has since been discharge and she is eating good which would have improved her potassium   Her alk-phos is mildly elevated but she does not have any bone problem, gallbladder problems it could be related to her: Problem  We discussed repeating labs but we have deferred at this time after discussing with the family    .\I have spent --105---- minutes of time which includes, time spent to prepare to see the patient , obtaining history ,performing examination, counseling/Educating the patient , Documenting clinical information in the record    No asterixis  --  1/29/2024- 1703   Checked for over-the-counter medication during my evaluation  Messaged surgeons about the mildly low Hb  --  1/30/2024- 1659     Followed up on the lab testing    INR is normal  Saturated iron ,near normal  Iron nicely improved  Ferritin normal  Creatinine mildly elevated and her kidney function has been fluctuating  Will work with the urologist about any possible postrenal element in her renal impairment  Hemoglobin, hematocrit nicely improved and has normalized  Platelet count is normal     Called to discuss labs  Unable to speak   Left a message   --  2/2/2024- 1934    Had radiology procedure today    Pre Op Diagnosis:   Urinary obstruction      Post Op Diagnosis:  same     Procedure:left percutaneous nephrostomy tube change    Called to follow up , to address any concerns with the up coming surgery or any questions on Medication instructions   Unable to speak   Left a message to call, if needed , if has any concerns  with the up coming surgery or any questions on Medication instructions , if had changes to medication or health , since my evaluation    Follow up about the kidney function

## 2024-01-29 NOTE — ASSESSMENT & PLAN NOTE
She is doing good from a migraine standpoint  She is not known to have history suggestive of PFO/ASD

## 2024-01-29 NOTE — ASSESSMENT & PLAN NOTE
She has currently not having any suicidal ideation  She is currently taking Remeron  She has not been taking the Remeron as she does not feel depressed

## 2024-01-29 NOTE — ASSESSMENT & PLAN NOTE
Her most recent kidney function from December was closer to her better creatinines that she has had in the past   I went back on the previous creatinines and have noticed that her kidney function has been fluctuating  Off note she has recurrent urinary tract infection  She has currently not taking anti-inflammatory medication    Stages of CKD discussed  Deleterious effects NSAID's , Beneficial effects of Hydration discussed   Tylenol as needed for pain     I  suggest monitoring renal function, in put and out put status socorro-operatively. I  suggest avoiding nephrotoxic medication including NSAIDs, COX2 inhibitors, intravenous contrast agent,avoiding hypotension to prevent further renal impairment.

## 2024-01-29 NOTE — ASSESSMENT & PLAN NOTE
She was cervical cancer in 2015 and as per the history obtained she had not require any treatment until 2018 when she had lymph node surgery and had chemo and radiation  She currently does not have any recurrence of cancer

## 2024-01-29 NOTE — ASSESSMENT & PLAN NOTE
Her hemoglobin is about 11 and she does not have any visible blood losses apart from the blood in the urine when the nephrostomy tube gets changed

## 2024-01-29 NOTE — ASSESSMENT & PLAN NOTE
She had blood clot in both of her legs in her vein in the postoperative setting while she was in the rehab  Her blood clot at that time seems to have been a provoked blood clot from reduced mobility in the postoperative setting in the rehab setting    History of DVT  NoPE  1 Episode  Associated with reduced mobility, no long journeys, no cancer, prior surgery, Hospital stay, noHRT  No IVC filter      She had no recurrence of blood clot  Increased risk of thrombosis in the socorro operative period , compression stocking use discussed

## 2024-01-29 NOTE — ASSESSMENT & PLAN NOTE
She had stroke when she was age 7 per the history that I have obtained from her   She had right-sided facial weakness  ' she has no longer having any right-sided weakness  She has no longer have any right-sided facial drooping  Given the young age she was felt to have stroke, I wonder if she had Bell's palsy    Unsure if it is hemorrhagic or ischemic   To her understanding she may have had a stroke and she was age 11    2018  Unremarkable MRA of the intracranial circulation.

## 2024-01-29 NOTE — ASSESSMENT & PLAN NOTE
She has currently not on any medication for hypertension  Hypertension-  Blood pressure is acceptable . I suggest  blood pressure monitoring.I suggest addressing pain control as uncontrolled pain can increased blood pressure

## 2024-01-29 NOTE — ASSESSMENT & PLAN NOTE
She had chemotherapy for cervical cancer about 2021 and she has currently not having any tingling numbness of the hands and feet

## 2024-01-29 NOTE — OUTPATIENT SUBJECTIVE & OBJECTIVE
"Outpatient Subjective & Objective     Chief complaint-Preoperative evaluation, Perioperative Medical management, complication reduction plan   She has not known to have heart problems    Active cardiac conditions- none    Revised cardiac risk index predictors- high-risk type of surgery    Functional capacity -Examples of physical activity  , she is limited with physical activity need to address her bags, can take 1 flight of stairs----- She can undertake all the above activities without  chest pain,chest tightness, Shortness of breath ,dizziness,lightheadedness making her exercise tolerance more,  than 4 Mets.         Review of Systems   Constitutional:  Negative for chills and fever.        No unusual weight changes     HENT:          STOPBANG score  / 8        Age over 50    Eyes:         No sudden visual changes  She wears reading glasses   Respiratory:          No cough , phlegm  Cough with phlegm   No change in color , consistency, amount of phlegm   Dry cough   No Hemoptysis   Cardiovascular:         As noted   Gastrointestinal:         No overt GI/ blood losses  Bowel movements- Regular   Endocrine:        Prednisone use > 20 mg daily for 3 weeks- none   Genitourinary:         No urinary hesitancy    Musculoskeletal:         As above   No unusual, muscle, joint pains   Skin:  Negative for rash.   Neurological:  Negative for syncope.        No unilateral weakness   Hematological:         Current use of Anticoagulants  none   Psychiatric/Behavioral:            No SI/HI   No vascular stenting            No anesthesia, bleeding, cardiac problems, PONV with previous surgeries/procedures.  Medications and Allergies reviewed in epic.   FH- No anesthesia,bleeding / venous thrombosis , in family      Physical Exam  Blood pressure 115/74, pulse 92, resp. rate 16, height 5' 9" (1.753 m), weight 82.6 kg (182 lb), last menstrual period 06/08/2014, SpO2 98 %.    No fever  No falls , gall bladder problems    I offered a " sheetand the presence of a chaperone during physical examination   She was comfortable to proceed with the exam without the the presence of a chaperone        Physical Exam  Constitutional- Vitals - Body mass index is 26.88 kg/m².,   Vitals:    01/29/24 0944   BP: 115/74   Pulse: 92   Resp: 16     General appearance-Conscious,Coherent  Eyes- No conjunctival icterus,pupils  round  and reactive to light   ENT-Oral cavity- moist    , Hearing grossly normal   Neck- No thyromegaly ,Trachea -central, No jugular venous distension,   No Carotid Bruit   Cardiovascular -Heart Sounds- Normal  and  no murmur   , No gallop rhythm   Respiratory - Normal Respiratory Effort, Normal breath sounds,  no wheeze , and  no forced expiratory wheeze    Peripheral pitting pedal edema-- none , no calf pain   Gastrointestinal -Soft abdomen, No palpable masses, Non Tender,Liver,Spleen not palpable. No-- free fluid and shifting dullness  Musculoskeletal- No finger Clubbing. Strength grossly normal   Lymphatic-No Palpable cervical, axillary,Inguinal lymphadenopathy   Psychiatric - normal effect,Orientation  Rt Dorsalis pedis pulses-palpable    Lt Dorsalis pedis pulses- palpable   Rt Posterior tibial pulses -palpable   Left posterior tibial pulses -palpable   Miscellaneous -  no renal bruit  Her ostomy drainage was red that she attributes to the red Gatorade she drank early   Investigations  Lab and Imaging have been reviewed in epic.    Review of Medicine tests    EKG- I had independently reviewed the EKG from--12/8/2024  It was reported to be showing     Sinus tachycardia   Biatrial enlargement   Right superior axis deviation   Right ventricular hypertrophy   Abnormal ECG   When compared with ECG of 03-NOV-2023 20:57,   Vent. rate has increased BY  39 BPM   Questionable change in The axis     Oct 2022    The left ventricle is normal in size with mild concentric hypertrophy and normal systolic function.  The estimated ejection fraction is  70%.  Normal left ventricular diastolic function.  Normal right ventricular size with normal right ventricular systolic function.  Mild mitral regurgitation.  Normal central venous pressure (3 mmHg).  The estimated PA systolic pressure is 16 mmHg.  Oct 2022       1. Scintigraphically negative for ischemia or infarct.  2. The global left ventricular systolic function is normal with an LV ejection fraction of greater than 75 % and no evidence of LV dilatation. Wall motion is normal.      Review of clinical lab tests:  Lab Results   Component Value Date    CREATININE 1.2 12/13/2023    HGB 11.0 (L) 12/13/2023     12/13/2023             Review of old records- Was done and information gathered regards to events leading to surgery and health conditions of significance in the perioperative period.    Outpatient Subjective & Objective

## 2024-01-29 NOTE — ASSESSMENT & PLAN NOTE
Holiness  I suggest that the perioperative team be aware of this so that appropriate preventive care can be taken

## 2024-01-30 ENCOUNTER — TELEPHONE (OUTPATIENT)
Dept: INTERNAL MEDICINE | Facility: CLINIC | Age: 59
End: 2024-01-30
Payer: MEDICAID

## 2024-01-30 ENCOUNTER — LAB VISIT (OUTPATIENT)
Dept: LAB | Facility: HOSPITAL | Age: 59
End: 2024-01-30
Attending: HOSPITALIST
Payer: MEDICAID

## 2024-01-30 DIAGNOSIS — N18.9 ANEMIA IN CHRONIC KIDNEY DISEASE, UNSPECIFIED CKD STAGE: ICD-10-CM

## 2024-01-30 DIAGNOSIS — D63.1 ANEMIA IN CHRONIC KIDNEY DISEASE, UNSPECIFIED CKD STAGE: ICD-10-CM

## 2024-01-30 LAB
ALBUMIN SERPL BCP-MCNC: 3.5 G/DL (ref 3.5–5.2)
ALP SERPL-CCNC: 146 U/L (ref 55–135)
ALT SERPL W/O P-5'-P-CCNC: 25 U/L (ref 10–44)
ANION GAP SERPL CALC-SCNC: 9 MMOL/L (ref 8–16)
AST SERPL-CCNC: 23 U/L (ref 10–40)
BASOPHILS # BLD AUTO: 0.05 K/UL (ref 0–0.2)
BASOPHILS NFR BLD: 0.6 % (ref 0–1.9)
BILIRUB SERPL-MCNC: 0.4 MG/DL (ref 0.1–1)
BUN SERPL-MCNC: 23 MG/DL (ref 8–23)
CALCIUM SERPL-MCNC: 9.8 MG/DL (ref 8.7–10.5)
CHLORIDE SERPL-SCNC: 110 MMOL/L (ref 95–110)
CO2 SERPL-SCNC: 18 MMOL/L (ref 23–29)
CREAT SERPL-MCNC: 1.7 MG/DL (ref 0.5–1.4)
DIFFERENTIAL METHOD BLD: ABNORMAL
EOSINOPHIL # BLD AUTO: 0.2 K/UL (ref 0–0.5)
EOSINOPHIL NFR BLD: 2.5 % (ref 0–8)
ERYTHROCYTE [DISTWIDTH] IN BLOOD BY AUTOMATED COUNT: 15.5 % (ref 11.5–14.5)
EST. GFR  (NO RACE VARIABLE): 33.9 ML/MIN/1.73 M^2
FERRITIN SERPL-MCNC: 115 NG/ML (ref 20–300)
GLUCOSE SERPL-MCNC: 104 MG/DL (ref 70–110)
HCT VFR BLD AUTO: 43.3 % (ref 37–48.5)
HGB BLD-MCNC: 13.2 G/DL (ref 12–16)
IMM GRANULOCYTES # BLD AUTO: 0.03 K/UL (ref 0–0.04)
IMM GRANULOCYTES NFR BLD AUTO: 0.3 % (ref 0–0.5)
INR PPP: 0.9 (ref 0.8–1.2)
IRON SERPL-MCNC: 69 UG/DL (ref 30–160)
LYMPHOCYTES # BLD AUTO: 2.1 K/UL (ref 1–4.8)
LYMPHOCYTES NFR BLD: 22.9 % (ref 18–48)
MCH RBC QN AUTO: 28 PG (ref 27–31)
MCHC RBC AUTO-ENTMCNC: 30.5 G/DL (ref 32–36)
MCV RBC AUTO: 92 FL (ref 82–98)
MONOCYTES # BLD AUTO: 0.9 K/UL (ref 0.3–1)
MONOCYTES NFR BLD: 10.1 % (ref 4–15)
NEUTROPHILS # BLD AUTO: 5.8 K/UL (ref 1.8–7.7)
NEUTROPHILS NFR BLD: 63.6 % (ref 38–73)
NRBC BLD-RTO: 0 /100 WBC
PLATELET # BLD AUTO: 311 K/UL (ref 150–450)
PMV BLD AUTO: 9.6 FL (ref 9.2–12.9)
POTASSIUM SERPL-SCNC: 3.6 MMOL/L (ref 3.5–5.1)
PROT SERPL-MCNC: 8.7 G/DL (ref 6–8.4)
PROTHROMBIN TIME: 10.1 SEC (ref 9–12.5)
RBC # BLD AUTO: 4.72 M/UL (ref 4–5.4)
SATURATED IRON: 19 % (ref 20–50)
SODIUM SERPL-SCNC: 137 MMOL/L (ref 136–145)
TOTAL IRON BINDING CAPACITY: 366 UG/DL (ref 250–450)
TRANSFERRIN SERPL-MCNC: 247 MG/DL (ref 200–375)
WBC # BLD AUTO: 9.08 K/UL (ref 3.9–12.7)

## 2024-01-30 PROCEDURE — 36415 COLL VENOUS BLD VENIPUNCTURE: CPT | Performed by: HOSPITALIST

## 2024-01-30 PROCEDURE — 83540 ASSAY OF IRON: CPT | Performed by: HOSPITALIST

## 2024-01-30 PROCEDURE — 85610 PROTHROMBIN TIME: CPT | Performed by: HOSPITALIST

## 2024-01-30 PROCEDURE — 85025 COMPLETE CBC W/AUTO DIFF WBC: CPT | Performed by: HOSPITALIST

## 2024-01-30 PROCEDURE — 80053 COMPREHEN METABOLIC PANEL: CPT | Performed by: HOSPITALIST

## 2024-01-30 PROCEDURE — 82728 ASSAY OF FERRITIN: CPT | Performed by: HOSPITALIST

## 2024-01-31 ENCOUNTER — TELEPHONE (OUTPATIENT)
Dept: INTERVENTIONAL RADIOLOGY/VASCULAR | Facility: HOSPITAL | Age: 59
End: 2024-01-31
Payer: MEDICAID

## 2024-01-31 NOTE — NURSING
Pre-procedure call complete.  Pt instructed not to eat or drink anything after midnight the night before procedure.  Pt aware will need someone to provide transport home and monitor pt 8 hours post procedure.  No driving for at least 24 hours after procedure.   Patient advised to take blood pressure, heart medications, seizure and anti-rejections medications if prescribed with a sip of water morning of procedure.  Patient verbalized aware of which medications to take.  Do not take , sleep medication (including OTC) and anxiety medication the night before procedure.  Arrival time and location given.  Expected length of stay reviewed.  Covid screening completed.  Pt verbalized understanding of all pre-procedure instructions.

## 2024-02-02 ENCOUNTER — HOSPITAL ENCOUNTER (OUTPATIENT)
Dept: INTERVENTIONAL RADIOLOGY/VASCULAR | Facility: HOSPITAL | Age: 59
Discharge: HOME | End: 2024-02-02
Attending: UROLOGY
Payer: MEDICAID

## 2024-02-02 VITALS
RESPIRATION RATE: 15 BRPM | OXYGEN SATURATION: 100 % | HEART RATE: 74 BPM | DIASTOLIC BLOOD PRESSURE: 55 MMHG | TEMPERATURE: 98 F | SYSTOLIC BLOOD PRESSURE: 108 MMHG

## 2024-02-02 DIAGNOSIS — Z93.6 NEPHROSTOMY STATUS: ICD-10-CM

## 2024-02-02 DIAGNOSIS — N13.1 HYDRONEPHROSIS WITH URETERAL STRICTURE: ICD-10-CM

## 2024-02-02 DIAGNOSIS — N13.1 HYDRONEPHROSIS WITH URETERAL STRICTURE: Primary | ICD-10-CM

## 2024-02-02 PROCEDURE — C1729 CATH, DRAINAGE: HCPCS

## 2024-02-02 PROCEDURE — 63600175 PHARM REV CODE 636 W HCPCS: Performed by: STUDENT IN AN ORGANIZED HEALTH CARE EDUCATION/TRAINING PROGRAM

## 2024-02-02 PROCEDURE — 63600175 PHARM REV CODE 636 W HCPCS: Performed by: INTERNAL MEDICINE

## 2024-02-02 PROCEDURE — 25000003 PHARM REV CODE 250: Performed by: STUDENT IN AN ORGANIZED HEALTH CARE EDUCATION/TRAINING PROGRAM

## 2024-02-02 PROCEDURE — 50435 EXCHANGE NEPHROSTOMY CATH: CPT | Mod: LT | Performed by: STUDENT IN AN ORGANIZED HEALTH CARE EDUCATION/TRAINING PROGRAM

## 2024-02-02 RX ORDER — LIDOCAINE HYDROCHLORIDE 10 MG/ML
1 INJECTION, SOLUTION EPIDURAL; INFILTRATION; INTRACAUDAL; PERINEURAL ONCE
Status: DISCONTINUED | OUTPATIENT
Start: 2024-02-02 | End: 2024-02-03 | Stop reason: HOSPADM

## 2024-02-02 RX ORDER — SODIUM CHLORIDE 9 MG/ML
INJECTION, SOLUTION INTRAVENOUS CONTINUOUS
Status: DISCONTINUED | OUTPATIENT
Start: 2024-02-02 | End: 2024-02-03 | Stop reason: HOSPADM

## 2024-02-02 RX ORDER — FENTANYL CITRATE 50 UG/ML
INJECTION, SOLUTION INTRAMUSCULAR; INTRAVENOUS
Status: COMPLETED | OUTPATIENT
Start: 2024-02-02 | End: 2024-02-02

## 2024-02-02 RX ORDER — MIDAZOLAM HYDROCHLORIDE 1 MG/ML
INJECTION INTRAMUSCULAR; INTRAVENOUS
Status: COMPLETED | OUTPATIENT
Start: 2024-02-02 | End: 2024-02-02

## 2024-02-02 RX ADMIN — FENTANYL CITRATE 25 MCG: 50 INJECTION, SOLUTION INTRAMUSCULAR; INTRAVENOUS at 12:02

## 2024-02-02 RX ADMIN — MIDAZOLAM HYDROCHLORIDE 1 MG: 2 INJECTION, SOLUTION INTRAMUSCULAR; INTRAVENOUS at 12:02

## 2024-02-02 RX ADMIN — CEFTRIAXONE 1 G: 1 INJECTION, POWDER, FOR SOLUTION INTRAMUSCULAR; INTRAVENOUS at 12:02

## 2024-02-02 NOTE — PLAN OF CARE
Pt arrived to IR room 190 for nephrostomy tube change. Pt oriented to unit and staff. Plan of care reviewed with patient, patient verbalizes understanding. Comfort measures utilized. Pt safely transferred from stretcher to procedural table. Fall risk reviewed with patient, fall risk interventions maintained. Safety strap applied, positioner pillows utilized to minimize pressure points. Blankets applied. Pt prepped and draped utilizing standard sterile technique. Patient placed on continuous monitoring, as required by sedation policy. Timeouts completed utilizing standard universal time-out, per department and facility policy. RN to remain at bedside, continuous monitoring maintained. Pt resting comfortably. Denies pain/discomfort. Will continue to monitor. See flow sheets for monitoring, medication administration, and updates.

## 2024-02-02 NOTE — PLAN OF CARE
Chart reviewed. Preop nursing care completed per orders. Safe surgery checklist complete aside from procedure consent and H&P update.  will be picking her up when she is ready to be discharged-approximately 15 minutes away. Pt arrived to [facility] with mepilex dressing to right buttock, left nephrostomy tube with no dressing, urostomy to left abdomen, and ileostomy to right abdomen. Pt stated her ileostomy has been leaking recently and causing some skin irritation around site. Pt's belongings at bedside and to go with pt to procedure. Notified IR that pt's preop is completed aside from the above mentioned consent and update. Call bell within reach. Instructed pt to call for assistance.

## 2024-02-02 NOTE — H&P
Radiology History & Physical      SUBJECTIVE:     Chief Complaint: neph tube    History of Present Illness:  Edita Riley is a 59 y.o. female who presents for routine neph tube exchange.    Past Medical History:   Diagnosis Date    Abnormal mammogram 08/25/2020    Abnormal mammogram 08/25/2020    Acute blood loss anemia     Acute deep vein thrombosis (DVT) of lower extremity 12/09/2020    Advance care planning 04/30/2021    ODESSA (acute kidney injury) 03/21/2020    Anemia due to chronic blood loss     Anemia due to chronic kidney disease     Anemia due to chronic kidney disease 05/18/2020    Anxiety     Bilateral ureteral obstruction 09/11/2020    Bilious vomiting with nausea 06/11/2023    Cardiovascular event risk -- low 09/14/2015    ASCVD 10-year risk 1.9% (with optimal risk factors 1.3%) as of 9/14/2015     Cervical cancer 2014    Chronic back pain     Colostomy care     Deep vein thrombosis     Depression     Diarrhea due to malabsorption 11/14/2018    Difficult intubation     Discharge planning issues 04/30/2021    Disorder of kidney and ureter     DVT of lower extremity, bilateral 11/04/2020    Edema 04/10/2023    Emphysema of lung 04/10/2023    Fibromyalgia     Fungemia 09/27/2020    Generalized abdominal pain 08/25/2020    GERD (gastroesophageal reflux disease)     Hemifacial spasm 09/16/2015    Hiatal hernia 2014    History of cervical cancer 10/11/2018    History of essential hypertension 05/04/2015    Not requiring medications at this time  BP is low- concern that this may correlate with increased stool output from stoma. Encourage her to contact GI and her PCP.    Hx of psychiatric care     Cyraji trazodone    Hypertension     Hypomagnesemia 11/21/2018    Hyponatremia 09/10/2023    Impaired functional mobility, balance, gait, and endurance 07/24/2015    Lactose intolerance     Major depressive disorder, recurrent episode, moderate 06/02/2023    Metastatic squamous cell carcinoma to lymph  node 10/11/2018    Moderate episode of recurrent major depressive disorder 04/21/2021    Nephrostomy complication 10/26/2023    Neuropathy due to chemotherapeutic drug 11/14/2018    Osteoarthritis of back     Peritonitis 09/22/2020    Physical deconditioning 04/30/2021    Pseudomonas urinary tract infection 04/21/2021    Psychiatric problem     Pyelitis 09/09/2023    QT prolongation 04/16/2021    Refusal of blood transfusions as patient is [employer]     Schatzki's ring 09/14/2015    Seen on outside EGD 05/2014, underwent esophageal dilatation. Bx were negative.     Seizure-like activity 12/02/2018    Seizures     Sleep stage dysfunction     Noted on PSG 06/2017; negative for obstructive sleep apnea     Stroke     Urinary tract infection associated with nephrostomy catheter 06/11/2020    Wound infection after surgery 09/24/2020     Past Surgical History:   Procedure Laterality Date    ANTEGRADE NEPHROSTOGRAPHY Bilateral 9/28/2020    Procedure: Nephrostogram - antegrade;  Surgeon: Leslie Balbuena MD;  Location: [facility] OR [address] [place] [address];  Service: Urology;  Laterality: Bilateral;    ANTEGRADE NEPHROSTOGRAPHY Left 4/20/2021    Procedure: NEPHROSTOGRAM, ANTEGRADE;  Surgeon: Leslie Balbuena MD;  [facility];  Service: Urology;  Laterality: Left;    ANTEGRADE NEPHROSTOGRAPHY Right 10/27/2022    Procedure: NEPHROSTOGRAM, ANTEGRADE;  Surgeon: Chirag Russ MD;  Location: [facility] OR [address] [place] [address];  Service: Urology;  Laterality: Right;    ANTEGRADE NEPHROSTOGRAPHY Right 4/21/2023    Procedure: NEPHROSTOGRAM, ANTEGRADE;  Surgeon: Chirag Russ MD;  Location: [facility] OR 2ND FLR;  Service: Urology;  Laterality: Right;    ANTEGRADE NEPHROSTOGRAPHY Left 6/6/2023    Procedure: Nephrostogram - antegrade;  Surgeon: Leslie Balbuena MD;  Location: [facility] OR [address] [place] [address];  Service: Urology;  Laterality: Left;    BILATERAL OOPHORECTOMY  2015    CHOLECYSTECTOMY  11/09/2016    Done at [facility], path showed chronic cholecystitis  and gallstones    cold knife conization  2014    COLECTOMY, RIGHT  9/17/2020    Procedure: COLECTOMY, RIGHT Extended;  Surgeon: Hammad Reynolds MD;  Location: [facility] OR 2ND FLR;  Service: Colon and Rectal;;    COLONOSCOPY  2014    COLONOSCOPY N/A 10/24/2016    at [facility], Dr Gutiérrez    COLONOSCOPY N/A 5/18/2018    Procedure: COLONOSCOPY;  Surgeon: Arden Gutiérrez MD;  Location: [facility] (4TH FLR);  Service: Endoscopy;  Laterality: N/A;    COLONOSCOPY N/A 7/28/2020    Procedure: COLONOSCOPY;  Surgeon: Hammad Reynolds MD;  Location: [facility] (4TH FLR);  Service: Colon and Rectal;  Laterality: N/A;  covid test [place] 7/25    CYSTOSCOPY WITH URETEROSCOPY, RETROGRADE PYELOGRAPHY, AND INSERTION OF STENT Bilateral 3/21/2020    Procedure: CYSTOSCOPY, WITH RETROGRADE PYELOGRAM,;  Surgeon: Leslie Balbuena MD;  Location: [facility] OR 1ST FLR;  Service: Urology;  Laterality: Bilateral;    DILATION OF NEPHROSTOMY TRACT Right 10/27/2022    Procedure: DILATION, NEPHROSTOMY TRACT;  Surgeon: Chirag Russ MD;  Location: [facility] OR 1ST FLR;  Service: Urology;  Laterality: Right;    ESOPHAGOGASTRODUODENOSCOPY  2014    ESOPHAGOGASTRODUODENOSCOPY  11/18/2020    ESOPHAGOGASTRODUODENOSCOPY N/A 11/18/2020    Procedure: ESOPHAGOGASTRODUODENOSCOPY (EGD);  Surgeon: Zenon Spencer MD;  Location: [facility];  Service: Endoscopy;  Laterality: N/A;    ESOPHAGOGASTRODUODENOSCOPY N/A 12/11/2020    Procedure: EGD (ESOPHAGOGASTRODUODENOSCOPY);  Surgeon: Juancho Muse MD;  Location: [facility] ENDO (2ND FLR);  Service: Endoscopy;  Laterality: N/A;    HYSTERECTOMY  2014    [facility] for cervical cancer    ILEOSTOMY  9/17/2020    Procedure: CREATION, ILEOSTOMY;  Surgeon: Hammad Reynolds MD;  Location: [facility] OR 2ND FLR;  Service: Colon and Rectal;;    LOOPOGRAM N/A 4/20/2021    Procedure: LOOPOGRAM;  Surgeon: Leslie Balbuena MD;  Location: [facility] OR 1ST FLR;  Service: Urology;  Laterality: N/A;    LOOPOGRAM N/A 6/6/2023    Procedure: LOOPOGRAM;  Surgeon:  Leslie Balbuena MD;  Location: [facility] OR 1ST FLR;  Service: Urology;  Laterality: N/A;    MOBILIZATION OF SPLENIC FLEXURE N/A 9/11/2020    Procedure: MOBILIZATION, COLONIC;  Surgeon: Hammad Reynolds MD;  Location: [facility] OR 2ND FLR;  Service: Colon and Rectal;  Laterality: N/A;    NEPHROSTOGRAPHY Bilateral 4/17/2021    Procedure: Nephrostogram;  Surgeon: Celeste Surgeon;  Location: [facility];  Service: Anesthesiology;  Laterality: Bilateral;    OOPHORECTOMY      PERCUTANEOUS NEPHROLITHOTOMY Right 4/21/2023    Procedure: NEPHROLITHOTOMY, PERCUTANEOUS;  Surgeon: Chirag Russ MD;  Location: [facility] OR 2ND FLR;  Service: Urology;  Laterality: Right;  2.5 hrs    PERCUTANEOUS NEPHROSTOMY  4/21/2023    Procedure: CREATION, NEPHROSTOMY, PERCUTANEOUS with removal of existing nephrostomy tube;  Surgeon: Chirag Russ MD;  [facility];  Service: Urology;;    PYELOSCOPY Right 10/27/2022    Procedure: PYELOSCOPY;  Surgeon: Chirag Russ MD;  [facility];  Service: Urology;  Laterality: Right;    REPLACEMENT OF NEPHROSTOMY TUBE Right 10/27/2022    Procedure: REPLACEMENT, NEPHROSTOMY TUBE;  Surgeon: Chirag Russ MD;  [facility];  Service: Urology;  Laterality: Right;    RETROPERITONEAL LYMPHADENECTOMY Right 9/17/2018    Procedure: LYMPHADENECTOMY, RETROPERITONEUM;  Surgeon: Sebas Reed MD;  [facility];  Service: General;  Laterality: Right;  ILIAC    URETEROSCOPIC REMOVAL OF URETERIC CALCULUS Right 10/27/2022    Procedure: REMOVAL, CALCULUS, URETER, URETEROSCOPIC;  Surgeon: Chirag Russ MD;  [facility];  Service: Urology;  Laterality: Right;    URETEROSCOPY Right 10/27/2022    Procedure: URETEROSCOPY-ANTEGRADE;  Surgeon: Chirag Russ MD;  [facility];  Service: Urology;  Laterality: Right;       Home Meds:   Prior to Admission medications    Medication Sig Start Date End Date Taking? Authorizing Provider   ferrous  sulfate (FEOSOL) 325 mg (65 mg iron) Tab tablet Take 1 tablet (325 mg total) by mouth 2 (two) times daily. 12/18/23  Yes Leslie Balbuena MD   loratadine (CLARITIN) 10 mg tablet Take 10 mg by mouth daily as needed for Allergies. 9/5/23  Yes Segundo Lee   mirtazapine (REMERON) 30 MG tablet Take 1 tablet (30 mg total) by mouth nightly. 11/6/23 11/5/24 Yes Andriy Coley MD   ondansetron (ZOFRAN-ODT) 4 MG TbDL Dissolve 1 tablet (4 mg total) by mouth every 8 (eight) hours as needed (nausea). 11/6/23  Yes Andriy Coley MD   sodium bicarbonate 650 MG tablet Take 2 tablets (1,300 mg total) by mouth 2 (two) times daily. 12/13/23  Yes Jamar Campbell MD     Anticoagulants/Antiplatelets: no anticoagulation    Allergies:   Review of patient's allergies indicates:   Allergen Reactions    Bee sting [allergen ext-venom-honey bee]      Rash      Grass pollen-bermuda, standard      rash     Sedation History:  no adverse reactions    Review of Systems:   Hematological: no known coagulopathies  Respiratory: no shortness of breath  Cardiovascular: no chest pain  Gastrointestinal: no abdominal pain  Genito-Urinary: no dysuria  Musculoskeletal: negative  Neurological: no TIA or stroke symptoms         OBJECTIVE:     Vital Signs (Most Recent)  Temp: 98.2 °F (36.8 °C) (02/02/24 1053)  Pulse: 87 (02/02/24 1053)  Resp: 18 (02/02/24 1053)  BP: 127/78 (02/02/24 1053)  SpO2: 100 % (02/02/24 1053)    Physical Exam:  ASA: 3  Mallampati: 3    General: no acute distress  Mental Status: alert and oriented to person, place and time  HEENT: normocephalic, atraumatic  Chest: unlabored breathing  Heart: regular heart rate  Abdomen: nondistended  Extremity: moves all extremities    Laboratory  Lab Results   Component Value Date    INR 0.9 01/30/2024       Lab Results   Component Value Date    WBC 9.08 01/30/2024    HGB 13.2 01/30/2024    HCT 43.3 01/30/2024    MCV 92 01/30/2024     01/30/2024      Lab Results   Component Value  Date     01/30/2024     01/30/2024    K 3.6 01/30/2024     01/30/2024    CO2 18 (L) 01/30/2024    BUN 23 01/30/2024    CREATININE 1.7 (H) 01/30/2024    CALCIUM 9.8 01/30/2024    MG 2.0 12/10/2023    ALT 25 01/30/2024    AST 23 01/30/2024    ALBUMIN 3.5 01/30/2024    BILITOT 0.4 01/30/2024    BILIDIR 0.2 12/04/2020       ASSESSMENT/PLAN:     Sedation Plan: up to moderate  Patient will undergo L neph tube exchange.    Reina Cho MD MSCR  PGY-5 Radiology Resident

## 2024-02-02 NOTE — DISCHARGE INSTRUCTIONS
Please call with any questions or concerns.      Monday through Friday 8:00 am - 4:30 pm    Interventional Radiology   [phone] clinic    After Hours    Ask the  for the Radiology Resident on call  [phone]

## 2024-02-02 NOTE — NURSING
Procedure recovery complete. VSS. Procedure site dressing to left back is clean, dry, and intact. Patient tolerated PO nutrition with no N/V. Patient verbalizes understanding of discharge instructions including when to call the doctor. Neph tube is unclamped and draining clear yellow urine. PIV removed. Patient discharged home with .

## 2024-02-02 NOTE — PROCEDURES
IR Post-Procedure Note    Pre Op Diagnosis:   Urinary obstruction     Post Op Diagnosis:  same    Procedure:left percutaneous nephrostomy tube change    Procedure performed by: Reina Cho MD / Refugio Ascencio MD    Written Informed Consent Obtained:  Yes    Specimen Removed: No     Estimated Blood Loss:  Minimal     Findings:     Local anesthesia and moderate sedation were used.      Successful exchange of  L nephrostomy tube     Plan:    Routine exchange with VIR unless another intervention performed earlier.     Reina Cho MD MSCR  PGY-5 Radiology Resident

## 2024-02-02 NOTE — NURSING
Patient received s/p left nephrostomy tube exchange in MPU bay 4. Procedure site dressing to left back is clean and dry. No evidence of bleeding. Patient to recover for 1 hour and discharge home with . Fall precautions reviewed. Bed in lowest, locked position. Call light within reach.

## 2024-02-05 ENCOUNTER — ANESTHESIA EVENT (OUTPATIENT)
Dept: SURGERY | Facility: HOSPITAL | Age: 59
DRG: 329 | End: 2024-02-05
Payer: MEDICAID

## 2024-02-05 ENCOUNTER — TELEPHONE (OUTPATIENT)
Dept: UROLOGY | Facility: CLINIC | Age: 59
End: 2024-02-05
Payer: MEDICAID

## 2024-02-05 NOTE — ANESTHESIA PREPROCEDURE EVALUATION
[facility]  Anesthesia Pre-Operative Evaluation         Patient Name/: Edita Riley, 1965  MRN: [id number]    SUBJECTIVE:     Pre-operative evaluation for Procedure(s) (LRB):  REVISION, ANASTOMOSIS, URETEROILEAL (N/A)  OPEN LAPAROTOMY, EXPLORATORY (N/A)  CLOSURE, ILEOSTOMY (N/A)  ANASTOMOSIS, INTESTINE - Ileocolic anastomosis (N/A)     2024    Edita Riley is a 59 y.o. female w/ a significant PMHx of cervical CA s/p rad tx complicated by hydronephrosis, colon perforation, difficult intubation (prior grade 1 view 2 years ago), severe MDD, CKD3 w/ anemia of chronic disease, Jehovvah's witness, and HTN.    Patient is agreeable to cellsaver and albumin products. Pt refuses whole blood products.     Patient now presents for the above procedure(s).    ________________________________________  Results for orders placed during the hospital encounter of 10/20/22    Echo    Interpretation Summary  · The left ventricle is normal in size with mild concentric hypertrophy and normal systolic function.  · The estimated ejection fraction is 70%.  · Normal left ventricular diastolic function.  · Normal right ventricular size with normal right ventricular systolic function.  · Mild mitral regurgitation.  · Normal central venous pressure (3 mmHg).  · The estimated PA systolic pressure is 16 mmHg.    ________________________________________    Prev airway:     Performed by: Brigid Miranda CRNA  Authorized by: Micah Watson MD      Intubation:     Induction:  Intravenous    Intubated:  Postinduction    Mask Ventilation:  Easy mask    Attempts:  1    Attempted By:  CRNA    Method of Intubation:  Video laryngoscopy    Blade:  Martinez 3    Laryngeal View Grade: Grade IIA - cords partially seen      Difficult Airway Encountered?: No      Complications:  None    Airway Device:  Oral endotracheal tube     Airway Device Size:  7.0    Style/Cuff Inflation:  Cuffed    Inflation Amount (mL):  6    Tube secured:  22    Secured at:  The lips    Placement Verified By:  Capnometry    Complicating Factors:  Short neck and poor neck/head extension    Findings Post-Intubation:  BS equal bilateral    LDA:        Nephrostomy 02/02/24 1255 Left 12 Fr. (Active)   Urine Characteristics clear 02/02/24 1405   Clamp Status unclamped 02/02/24 1405   Characteristics dry;no redness;no warmth 02/02/24 1108   Dressing dry;intact 02/02/24 1405   Collection Container Leg bag 02/02/24 1405   Number of days: 3            Ileostomy 09/18/20 RLQ (Active)   Appliance leakage;intact 02/02/24 1059   Number of days: 1235            Urostomy 09/11/20 0000 ileal conduit LLQ (Active)   Stomal Appliance Clean;Dry;Intact 02/02/24 1100   Stoma Function yellow urine 02/02/24 1100   Number of days: 1242       Drips: None documented.      Patient Active Problem List   Diagnosis    Cervical cancer    Osteoarthritis of back    Lower extremity pain, diffuse    Weakness of both lower extremities    Colon polyp    Internal hemorrhoids    Severe episode of recurrent major depressive disorder, with psychotic features    Fibromyalgia    Carpal tunnel syndrome on right    History of cervical cancer    Metastatic squamous cell carcinoma to lymph node    PTSD (post-traumatic stress disorder)    Neuropathy due to chemotherapeutic drug    Hydronephrosis    Family history of colon cancer    Radiation cystitis    Moderate malnutrition    Vitamin D deficiency    Ileostomy in place    History of DVT (deep vein thrombosis)    Presence of urostomy    Hard to intubate    Nephrostomy status    Preprocedural cardiovascular examination    CKD (chronic kidney disease), stage III    Migraine without aura and without status migrainosus, not intractable    Arthritis    Emphysema of lung    Refusal of blood transfusions as patient is [place]    Small bowel obstruction     History of suicidal ideation    Essential hypertension    Anemia in chronic kidney disease    Azotemia    Gastro-esophageal reflux disease with esophagitis    Allergic rhinitis due to pollen    Weakness    Metabolic acidosis, NAG, bicarbonate losses    Difficult intravenous access    History of stroke       Review of patient's allergies indicates:   Allergen Reactions    Bee sting [allergen ext-venom-honey bee]      Rash      Grass pollen-bermuda, standard      rash       Current Inpatient Medications:       No current facility-administered medications on file prior to encounter.     Current Outpatient Medications on File Prior to Encounter   Medication Sig Dispense Refill    loratadine (CLARITIN) 10 mg tablet Take 10 mg by mouth daily as needed for Allergies.         Past Surgical History:   Procedure Laterality Date    ANTEGRADE NEPHROSTOGRAPHY Bilateral 9/28/2020    Procedure: Nephrostogram - antegrade;  Surgeon: Leslie Balbuena MD;  Location: [facility] OR [address] [place] [address];  Service: Urology;  Laterality: Bilateral;    ANTEGRADE NEPHROSTOGRAPHY Left 4/20/2021    Procedure: NEPHROSTOGRAM, ANTEGRADE;  Surgeon: Leslie Balbuena MD;  [facility];  Service: Urology;  Laterality: Left;    ANTEGRADE NEPHROSTOGRAPHY Right 10/27/2022    Procedure: NEPHROSTOGRAM, ANTEGRADE;  Surgeon: Chirag Russ MD;  Location: [facility] OR 1ST FLR;  Service: Urology;  Laterality: Right;    ANTEGRADE NEPHROSTOGRAPHY Right 4/21/2023    Procedure: NEPHROSTOGRAM, ANTEGRADE;  Surgeon: Chirag Russ MD;  Location: [facility] OR 2ND FLR;  Service: Urology;  Laterality: Right;    ANTEGRADE NEPHROSTOGRAPHY Left 6/6/2023    Procedure: Nephrostogram - antegrade;  Surgeon: Leslie Balbuena MD;  [facility];  Service: Urology;  Laterality: Left;    BILATERAL OOPHORECTOMY  2015    CHOLECYSTECTOMY  11/09/2016    Done at [facility], path showed chronic cholecystitis and gallstones    cold knife conization  2014    COLECTOMY, RIGHT   9/17/2020    Procedure: COLECTOMY, RIGHT Extended;  Surgeon: Hammad Reynolds MD;  Location: [facility] OR 2ND FLR;  Service: Colon and Rectal;;    COLONOSCOPY  2014    COLONOSCOPY N/A 10/24/2016    at [facility], Dr Gutiérrez    COLONOSCOPY N/A 5/18/2018    Procedure: COLONOSCOPY;  Surgeon: Arden Gutiérrez MD;  Location: [facility] (4TH FLR);  Service: Endoscopy;  Laterality: N/A;    COLONOSCOPY N/A 7/28/2020    Procedure: COLONOSCOPY;  Surgeon: Hammad Reynolds MD;  Location: [facility] (4TH FLR);  Service: Colon and Rectal;  Laterality: N/A;  covid test [place] 7/25    CYSTOSCOPY WITH URETEROSCOPY, RETROGRADE PYELOGRAPHY, AND INSERTION OF STENT Bilateral 3/21/2020    Procedure: CYSTOSCOPY, WITH RETROGRADE PYELOGRAM,;  Surgeon: Leslie Balbuena MD;  Location: [facility] OR 1ST FLR;  Service: Urology;  Laterality: Bilateral;    DILATION OF NEPHROSTOMY TRACT Right 10/27/2022    Procedure: DILATION, NEPHROSTOMY TRACT;  Surgeon: Chirag Russ MD;  Location: [facility] OR 1ST FLR;  Service: Urology;  Laterality: Right;    ESOPHAGOGASTRODUODENOSCOPY  2014    ESOPHAGOGASTRODUODENOSCOPY  11/18/2020    ESOPHAGOGASTRODUODENOSCOPY N/A 11/18/2020    Procedure: ESOPHAGOGASTRODUODENOSCOPY (EGD);  Surgeon: Zenon Spencer MD;  Location: [facility];  Service: Endoscopy;  Laterality: N/A;    ESOPHAGOGASTRODUODENOSCOPY N/A 12/11/2020    Procedure: EGD (ESOPHAGOGASTRODUODENOSCOPY);  Surgeon: Juancho Muse MD;  Location: [facility] (2ND FLR);  Service: Endoscopy;  Laterality: N/A;    HYSTERECTOMY  2014    [facility] for cervical cancer    ILEOSTOMY  9/17/2020    Procedure: CREATION, ILEOSTOMY;  Surgeon: Hammad Reynolds MD;  Location: [facility] OR 2ND FLR;  Service: Colon and Rectal;;    LOOPOGRAM N/A 4/20/2021    Procedure: LOOPOGRAM;  Surgeon: Leslie Balbuena MD;  Location: [facility] OR 1ST FLR;  Service: Urology;  Laterality: N/A;    LOOPOGRAM N/A 6/6/2023    Procedure: LOOPOGRAM;  Surgeon: Leslie Balbuena MD;  Location: [facility] OR [address] [place] [address];  Service: Urology;   Laterality: N/A;    MOBILIZATION OF SPLENIC FLEXURE N/A 9/11/2020    Procedure: MOBILIZATION, COLONIC;  Surgeon: Hammad Reynolds MD;  Location: [facility] OR 2ND FLR;  Service: Colon and Rectal;  Laterality: N/A;    NEPHROSTOGRAPHY Bilateral 4/17/2021    Procedure: Nephrostogram;  Surgeon: Celeste Surgeon;  Location: [facility];  Service: Anesthesiology;  Laterality: Bilateral;    OOPHORECTOMY      PERCUTANEOUS NEPHROLITHOTOMY Right 4/21/2023    Procedure: NEPHROLITHOTOMY, PERCUTANEOUS;  Surgeon: Chirag Russ MD;  Location: [facility] OR 2ND FLR;  Service: Urology;  Laterality: Right;  2.5 hrs    PERCUTANEOUS NEPHROSTOMY  4/21/2023    Procedure: CREATION, NEPHROSTOMY, PERCUTANEOUS with removal of existing nephrostomy tube;  Surgeon: Chirag Russ MD;  Location: [facility] OR 2ND FLR;  Service: Urology;;    PYELOSCOPY Right 10/27/2022    Procedure: PYELOSCOPY;  Surgeon: Chirag Russ MD;  [facility];  Service: Urology;  Laterality: Right;    REPLACEMENT OF NEPHROSTOMY TUBE Right 10/27/2022    Procedure: REPLACEMENT, NEPHROSTOMY TUBE;  Surgeon: Chirag Russ MD;  [facility];  Service: Urology;  Laterality: Right;    RETROPERITONEAL LYMPHADENECTOMY Right 9/17/2018    Procedure: LYMPHADENECTOMY, RETROPERITONEUM;  Surgeon: Sebas Reed MD;  [facility];  Service: General;  Laterality: Right;  ILIAC    URETEROSCOPIC REMOVAL OF URETERIC CALCULUS Right 10/27/2022    Procedure: REMOVAL, CALCULUS, URETER, URETEROSCOPIC;  Surgeon: Chirag Russ MD;  [facility];  Service: Urology;  Laterality: Right;    URETEROSCOPY Right 10/27/2022    Procedure: URETEROSCOPY-ANTEGRADE;  Surgeon: Chirag Russ MD;  [facility];  Service: Urology;  Laterality: Right;       Social History:  Tobacco Use: Low Risk  (2/2/2024)    Patient History     Smoking Tobacco Use: Never     Smokeless Tobacco Use: Never     Passive Exposure: Not on file       Alcohol Use: Not  At Risk (11/4/2023)    AUDIT-C     Frequency of Alcohol Consumption: Never     Average Number of Drinks: Patient does not drink     Frequency of Binge Drinking: Never       OBJECTIVE:     Vital Signs Range:  BMI Readings from Last 1 Encounters:   01/29/24 26.88 kg/m²               Significant Labs:        Component Value Date/Time    WBC 9.08 01/30/2024 1156    HGB 13.2 01/30/2024 1156    HCT 43.3 01/30/2024 1156    HCT 43 12/12/2023 1333     01/30/2024 1156     01/30/2024 1156    K 3.6 01/30/2024 1156     01/30/2024 1156    CO2 18 (L) 01/30/2024 1156     01/30/2024 1156    BUN 23 01/30/2024 1156    CREATININE 1.7 (H) 01/30/2024 1156    MG 2.0 12/10/2023 0355    PHOS 2.7 12/10/2023 0355    CALCIUM 9.8 01/30/2024 1156    ALBUMIN 3.5 01/30/2024 1156    PROT 8.7 (H) 01/30/2024 1156    ALKPHOS 146 (H) 01/30/2024 1156    BILITOT 0.4 01/30/2024 1156    AST 23 01/30/2024 1156    ALT 25 01/30/2024 1156    INR 0.9 01/30/2024 1156    HGBA1C 4.9 02/04/2021 0357        Please see Results Review for additional labs.     Diagnostic Studies: No relevant studies.    EKG:   Results for orders placed or performed during the hospital encounter of 12/07/23   EKG 12-lead    Collection Time: 12/07/23  6:24 PM    Narrative    Test Reason : R53.1,    Vent. Rate : 113 BPM     Atrial Rate : 113 BPM     P-R Int : 138 ms          QRS Dur : 078 ms      QT Int : 318 ms       P-R-T Axes : 085 243 076 degrees     QTc Int : 436 ms    Sinus tachycardia  Biatrial enlargement  Right superior axis deviation  Right ventricular hypertrophy  Abnormal ECG  When compared with ECG of 03-NOV-2023 20:57,  Vent. rate has increased BY  39 BPM  Questionable change in The axis  Confirmed by Ashley Reina MD ([id number]) on 12/8/2023 1:38:46 PM    Referred By: MERRY   SELF           Confirmed By:Ashley Reina MD       ECHO:  See subjective, if available.      ASSESSMENT/PLAN:           Pre-op Assessment    I have reviewed the Patient Summary  Reports.    I have reviewed the NPO Status.   I have reviewed the Medications.     Review of Systems  Anesthesia Hx:  No problems with previous Anesthesia   History of prior surgery of interest to airway management or planning:  Previous anesthesia: General        Denies Family Hx of Anesthesia complications.     Cardiovascular:     Hypertension                                  Hypertension         Pulmonary:   COPD         Chronic Obstructive Pulmonary Disease (COPD):                      Renal/:  Chronic Renal Disease        Kidney Function/Disease             Hepatic/GI:    Hiatal Hernia, GERD      Gerd    Hernia, Hiatal Hernia      Musculoskeletal:  Arthritis        Arthritis          Neurological:   CVA Neuromuscular Disease,  Headaches Seizures     Dx of Headaches   Arthritis      Seizure Disorder             CVA - Cerebrovasular Accident               Neuromuscular Disease   Psych:  Psychiatric History                  Physical Exam  General: Well nourished, Cooperative, Alert and Oriented    Airway:  Mallampati: III / III  Mouth Opening: Small, but > 3cm  TM Distance: 4 - 6 cm  Tongue: Normal  Neck ROM: Extension Decreased, Flexion Decreased, Left Lateral Motion Decreased, Right Lateral Motion Decreased    Dental:  Intact    Chest/Lungs:  Clear to auscultation, Normal Respiratory Rate    Heart:  Rate: Normal  Rhythm: Regular Rhythm  Sounds: Normal        Anesthesia Plan  Type of Anesthesia, risks & benefits discussed:    Anesthesia Type: Gen ETT  Intra-op Monitoring Plan: Standard ASA Monitors and Art Line  Post Op Pain Control Plan: multimodal analgesia and IV/PO Opioids PRN  Induction:  IV  Airway Plan: Direct and Video, Post-Induction  Informed Consent: Informed consent signed with the Patient and all parties understand the risks and agree with anesthesia plan.  All questions answered.   ASA Score: 3  Day of Surgery Review of History & Physical: H&P Update referred to the surgeon/provider.I have  interviewed and examined the patient. I have reviewed the patient's H&P dated:     Ready For Surgery From Anesthesia Perspective.     .

## 2024-02-05 NOTE — TELEPHONE ENCOUNTER
Good Morning. Your surgery will start at 7:00 am, but your arrival time is 5:00 am.   [facility] be on the [facility] Passed the gold elevators.  You will need for someone to drive you home because you will be under anaesthesia.  No eating or drinking after Midnight. Take a shower the night before and the morning of with a anti-bacterial soap, ( Dial Soap/ Hibiclens) Do not apply and deodorant, perfume, powder or body lotions the morning of surgery. Wear comfortable clothing, like loose fitting pants and a button down shirt. Leave all jewelry and valuables at home.   If you have any questions don't hesitate to call me. Leslie [phone]

## 2024-02-06 ENCOUNTER — HOSPITAL ENCOUNTER (INPATIENT)
Facility: HOSPITAL | Age: 59
LOS: 12 days | Discharge: HOME | DRG: 329 | End: 2024-02-18
Attending: UROLOGY | Admitting: UROLOGY
Payer: MEDICAID

## 2024-02-06 ENCOUNTER — ANESTHESIA (OUTPATIENT)
Dept: SURGERY | Facility: HOSPITAL | Age: 59
DRG: 329 | End: 2024-02-06
Payer: MEDICAID

## 2024-02-06 DIAGNOSIS — N13.30 HYDRONEPHROSIS OF LEFT KIDNEY: Primary | ICD-10-CM

## 2024-02-06 LAB
ABO + RH BLD: NORMAL
ALBUMIN SERPL BCP-MCNC: 2.3 G/DL (ref 3.5–5.2)
ALP SERPL-CCNC: 93 U/L (ref 55–135)
ALT SERPL W/O P-5'-P-CCNC: 22 U/L (ref 10–44)
ANION GAP SERPL CALC-SCNC: 11 MMOL/L (ref 8–16)
ANION GAP SERPL CALC-SCNC: 8 MMOL/L (ref 8–16)
AST SERPL-CCNC: 28 U/L (ref 10–40)
BASOPHILS # BLD AUTO: 0.02 K/UL (ref 0–0.2)
BASOPHILS NFR BLD: 0.1 % (ref 0–1.9)
BILIRUB SERPL-MCNC: 0.4 MG/DL (ref 0.1–1)
BLD GP AB SCN CELLS X3 SERPL QL: NORMAL
BUN SERPL-MCNC: 11 MG/DL (ref 8–23)
BUN SERPL-MCNC: 13 MG/DL (ref 8–23)
CALCIUM SERPL-MCNC: 7.4 MG/DL (ref 8.7–10.5)
CALCIUM SERPL-MCNC: 9.8 MG/DL (ref 8.7–10.5)
CHLORIDE SERPL-SCNC: 116 MMOL/L (ref 95–110)
CHLORIDE SERPL-SCNC: 118 MMOL/L (ref 95–110)
CO2 SERPL-SCNC: 12 MMOL/L (ref 23–29)
CO2 SERPL-SCNC: 15 MMOL/L (ref 23–29)
CREAT SERPL-MCNC: 1.4 MG/DL (ref 0.5–1.4)
CREAT SERPL-MCNC: 1.4 MG/DL (ref 0.5–1.4)
DIFFERENTIAL METHOD BLD: ABNORMAL
EOSINOPHIL # BLD AUTO: 0 K/UL (ref 0–0.5)
EOSINOPHIL NFR BLD: 0 % (ref 0–8)
ERYTHROCYTE [DISTWIDTH] IN BLOOD BY AUTOMATED COUNT: 15.5 % (ref 11.5–14.5)
EST. GFR  (NO RACE VARIABLE): 42.8 ML/MIN/1.73 M^2
EST. GFR  (NO RACE VARIABLE): 42.8 ML/MIN/1.73 M^2
GLUCOSE SERPL-MCNC: 144 MG/DL (ref 70–110)
GLUCOSE SERPL-MCNC: 88 MG/DL (ref 70–110)
HCT VFR BLD AUTO: 42 % (ref 37–48.5)
HGB BLD-MCNC: 13 G/DL (ref 12–16)
IMM GRANULOCYTES # BLD AUTO: 0.06 K/UL (ref 0–0.04)
IMM GRANULOCYTES NFR BLD AUTO: 0.4 % (ref 0–0.5)
LYMPHOCYTES # BLD AUTO: 0.7 K/UL (ref 1–4.8)
LYMPHOCYTES NFR BLD: 4 % (ref 18–48)
MAGNESIUM SERPL-MCNC: 2 MG/DL (ref 1.6–2.6)
MCH RBC QN AUTO: 28.4 PG (ref 27–31)
MCHC RBC AUTO-ENTMCNC: 31 G/DL (ref 32–36)
MCV RBC AUTO: 92 FL (ref 82–98)
MONOCYTES # BLD AUTO: 1.1 K/UL (ref 0.3–1)
MONOCYTES NFR BLD: 6.8 % (ref 4–15)
NEUTROPHILS # BLD AUTO: 14.4 K/UL (ref 1.8–7.7)
NEUTROPHILS NFR BLD: 88.7 % (ref 38–73)
NRBC BLD-RTO: 0 /100 WBC
PHOSPHATE SERPL-MCNC: 3.6 MG/DL (ref 2.7–4.5)
PLATELET # BLD AUTO: 303 K/UL (ref 150–450)
PMV BLD AUTO: 9.3 FL (ref 9.2–12.9)
POTASSIUM SERPL-SCNC: 3.8 MMOL/L (ref 3.5–5.1)
POTASSIUM SERPL-SCNC: 4.1 MMOL/L (ref 3.5–5.1)
PROT SERPL-MCNC: 5.6 G/DL (ref 6–8.4)
RBC # BLD AUTO: 4.57 M/UL (ref 4–5.4)
SODIUM SERPL-SCNC: 139 MMOL/L (ref 136–145)
SODIUM SERPL-SCNC: 141 MMOL/L (ref 136–145)
SPECIMEN OUTDATE: NORMAL
WBC # BLD AUTO: 16.2 K/UL (ref 3.9–12.7)

## 2024-02-06 PROCEDURE — 50690 INJECTION FOR URETER X-RAY: CPT | Mod: 51,,, | Performed by: UROLOGY

## 2024-02-06 PROCEDURE — 36000709 HC OR TIME LEV III EA ADD 15 MIN: Performed by: UROLOGY

## 2024-02-06 PROCEDURE — 88307 TISSUE EXAM BY PATHOLOGIST: CPT | Performed by: STUDENT IN AN ORGANIZED HEALTH CARE EDUCATION/TRAINING PROGRAM

## 2024-02-06 PROCEDURE — 0DBB0ZZ EXCISION OF ILEUM, OPEN APPROACH: ICD-10-PCS | Performed by: COLON & RECTAL SURGERY

## 2024-02-06 PROCEDURE — C2617 STENT, NON-COR, TEM W/O DEL: HCPCS | Performed by: UROLOGY

## 2024-02-06 PROCEDURE — 27201423 OPTIME MED/SURG SUP & DEVICES STERILE SUPPLY: Performed by: UROLOGY

## 2024-02-06 PROCEDURE — 37000009 HC ANESTHESIA EA ADD 15 MINS: Performed by: UROLOGY

## 2024-02-06 PROCEDURE — 63600175 PHARM REV CODE 636 W HCPCS: Performed by: STUDENT IN AN ORGANIZED HEALTH CARE EDUCATION/TRAINING PROGRAM

## 2024-02-06 PROCEDURE — 63600175 PHARM REV CODE 636 W HCPCS

## 2024-02-06 PROCEDURE — 44603 SUTURE SMALL INTESTINE: CPT | Mod: 22,59,, | Performed by: COLON & RECTAL SURGERY

## 2024-02-06 PROCEDURE — 44120 REMOVAL OF SMALL INTESTINE: CPT | Mod: 51,59,, | Performed by: COLON & RECTAL SURGERY

## 2024-02-06 PROCEDURE — 37000008 HC ANESTHESIA 1ST 15 MINUTES: Performed by: UROLOGY

## 2024-02-06 PROCEDURE — 71000016 HC POSTOP RECOV ADDL HR: Performed by: UROLOGY

## 2024-02-06 PROCEDURE — 0DT80ZZ RESECTION OF SMALL INTESTINE, OPEN APPROACH: ICD-10-PCS | Performed by: COLON & RECTAL SURGERY

## 2024-02-06 PROCEDURE — 88305 TISSUE EXAM BY PATHOLOGIST: CPT | Performed by: STUDENT IN AN ORGANIZED HEALTH CARE EDUCATION/TRAINING PROGRAM

## 2024-02-06 PROCEDURE — 88304 TISSUE EXAM BY PATHOLOGIST: CPT | Performed by: STUDENT IN AN ORGANIZED HEALTH CARE EDUCATION/TRAINING PROGRAM

## 2024-02-06 PROCEDURE — 50800 IMPLANT URETER IN BOWEL: CPT | Mod: 22,LT,, | Performed by: UROLOGY

## 2024-02-06 PROCEDURE — 50431 NJX PX NFROSGRM &/URTRGRM: CPT | Mod: 51,LT,, | Performed by: UROLOGY

## 2024-02-06 PROCEDURE — 88305 TISSUE EXAM BY PATHOLOGIST: CPT | Mod: 26,,, | Performed by: STUDENT IN AN ORGANIZED HEALTH CARE EDUCATION/TRAINING PROGRAM

## 2024-02-06 PROCEDURE — 25000003 PHARM REV CODE 250: Performed by: STUDENT IN AN ORGANIZED HEALTH CARE EDUCATION/TRAINING PROGRAM

## 2024-02-06 PROCEDURE — 88304 TISSUE EXAM BY PATHOLOGIST: CPT | Mod: 26,,, | Performed by: STUDENT IN AN ORGANIZED HEALTH CARE EDUCATION/TRAINING PROGRAM

## 2024-02-06 PROCEDURE — 86850 RBC ANTIBODY SCREEN: CPT | Performed by: UROLOGY

## 2024-02-06 PROCEDURE — 88307 TISSUE EXAM BY PATHOLOGIST: CPT | Mod: 26,,, | Performed by: STUDENT IN AN ORGANIZED HEALTH CARE EDUCATION/TRAINING PROGRAM

## 2024-02-06 PROCEDURE — 0DN80ZZ RELEASE SMALL INTESTINE, OPEN APPROACH: ICD-10-PCS | Performed by: COLON & RECTAL SURGERY

## 2024-02-06 PROCEDURE — 27201037 HC PRESSURE MONITORING SET UP

## 2024-02-06 PROCEDURE — 87088 URINE BACTERIA CULTURE: CPT

## 2024-02-06 PROCEDURE — 44625 REPAIR BOWEL OPENING: CPT | Mod: 51,,, | Performed by: COLON & RECTAL SURGERY

## 2024-02-06 PROCEDURE — 25000003 PHARM REV CODE 250

## 2024-02-06 PROCEDURE — 87086 URINE CULTURE/COLONY COUNT: CPT

## 2024-02-06 PROCEDURE — 80053 COMPREHEN METABOLIC PANEL: CPT | Performed by: STUDENT IN AN ORGANIZED HEALTH CARE EDUCATION/TRAINING PROGRAM

## 2024-02-06 PROCEDURE — 25000003 PHARM REV CODE 250: Performed by: NURSE ANESTHETIST, CERTIFIED REGISTERED

## 2024-02-06 PROCEDURE — 27000221 HC OXYGEN, UP TO 24 HOURS

## 2024-02-06 PROCEDURE — BT121ZZ FLUOROSCOPY OF LEFT KIDNEY USING LOW OSMOLAR CONTRAST: ICD-10-PCS | Performed by: UROLOGY

## 2024-02-06 PROCEDURE — D9220A PRA ANESTHESIA: Mod: ANES,,, | Performed by: ANESTHESIOLOGY

## 2024-02-06 PROCEDURE — 36000708 HC OR TIME LEV III 1ST 15 MIN: Performed by: UROLOGY

## 2024-02-06 PROCEDURE — 85025 COMPLETE CBC W/AUTO DIFF WBC: CPT | Performed by: STUDENT IN AN ORGANIZED HEALTH CARE EDUCATION/TRAINING PROGRAM

## 2024-02-06 PROCEDURE — 25500020 PHARM REV CODE 255: Performed by: UROLOGY

## 2024-02-06 PROCEDURE — 71000015 HC POSTOP RECOV 1ST HR: Performed by: UROLOGY

## 2024-02-06 PROCEDURE — BT1G1ZZ FLUOROSCOPY OF ILEAL LOOP, URETERS AND KIDNEYS USING LOW OSMOLAR CONTRAST: ICD-10-PCS | Performed by: UROLOGY

## 2024-02-06 PROCEDURE — 20600001 HC STEP DOWN PRIVATE ROOM

## 2024-02-06 PROCEDURE — 94761 N-INVAS EAR/PLS OXIMETRY MLT: CPT

## 2024-02-06 PROCEDURE — 36620 INSERTION CATHETER ARTERY: CPT | Mod: 59,,, | Performed by: ANESTHESIOLOGY

## 2024-02-06 PROCEDURE — 80048 BASIC METABOLIC PNL TOTAL CA: CPT | Performed by: STUDENT IN AN ORGANIZED HEALTH CARE EDUCATION/TRAINING PROGRAM

## 2024-02-06 PROCEDURE — 0T170ZB BYPASS LEFT URETER TO BLADDER, OPEN APPROACH: ICD-10-PCS | Performed by: UROLOGY

## 2024-02-06 PROCEDURE — 84100 ASSAY OF PHOSPHORUS: CPT | Performed by: STUDENT IN AN ORGANIZED HEALTH CARE EDUCATION/TRAINING PROGRAM

## 2024-02-06 PROCEDURE — D9220A PRA ANESTHESIA: Mod: CRNA,,, | Performed by: STUDENT IN AN ORGANIZED HEALTH CARE EDUCATION/TRAINING PROGRAM

## 2024-02-06 PROCEDURE — 83735 ASSAY OF MAGNESIUM: CPT | Performed by: STUDENT IN AN ORGANIZED HEALTH CARE EDUCATION/TRAINING PROGRAM

## 2024-02-06 PROCEDURE — 71000033 HC RECOVERY, INTIAL HOUR: Performed by: UROLOGY

## 2024-02-06 DEVICE — STENT SET URET DIV 7FR 75CM: Type: IMPLANTABLE DEVICE | Site: URETER | Status: FUNCTIONAL

## 2024-02-06 RX ORDER — HALOPERIDOL 5 MG/ML
0.5 INJECTION INTRAMUSCULAR EVERY 10 MIN PRN
Status: DISCONTINUED | OUTPATIENT
Start: 2024-02-06 | End: 2024-02-06 | Stop reason: HOSPADM

## 2024-02-06 RX ORDER — HYDROMORPHONE HYDROCHLORIDE 1 MG/ML
0.5 INJECTION, SOLUTION INTRAMUSCULAR; INTRAVENOUS; SUBCUTANEOUS EVERY 6 HOURS PRN
Status: DISCONTINUED | OUTPATIENT
Start: 2024-02-06 | End: 2024-02-07

## 2024-02-06 RX ORDER — SODIUM CHLORIDE 0.9 % (FLUSH) 0.9 %
10 SYRINGE (ML) INJECTION
Status: DISCONTINUED | OUTPATIENT
Start: 2024-02-06 | End: 2024-02-18 | Stop reason: HOSPADM

## 2024-02-06 RX ORDER — VANCOMYCIN HYDROCHLORIDE 1.25 G/25ML
INJECTION, POWDER, LYOPHILIZED, FOR SOLUTION INTRAVENOUS
Status: DISCONTINUED | OUTPATIENT
Start: 2024-02-06 | End: 2024-02-06

## 2024-02-06 RX ORDER — HYDROMORPHONE HYDROCHLORIDE 1 MG/ML
1 INJECTION, SOLUTION INTRAMUSCULAR; INTRAVENOUS; SUBCUTANEOUS EVERY 6 HOURS PRN
Status: DISCONTINUED | OUTPATIENT
Start: 2024-02-06 | End: 2024-02-07

## 2024-02-06 RX ORDER — ACETAMINOPHEN 500 MG
1000 TABLET ORAL EVERY 8 HOURS
Status: DISCONTINUED | OUTPATIENT
Start: 2024-02-07 | End: 2024-02-07

## 2024-02-06 RX ORDER — SUCCINYLCHOLINE CHLORIDE 20 MG/ML
INJECTION INTRAMUSCULAR; INTRAVENOUS
Status: DISCONTINUED | OUTPATIENT
Start: 2024-02-06 | End: 2024-02-06

## 2024-02-06 RX ORDER — ONDANSETRON HYDROCHLORIDE 2 MG/ML
INJECTION, SOLUTION INTRAVENOUS
Status: DISCONTINUED | OUTPATIENT
Start: 2024-02-06 | End: 2024-02-06

## 2024-02-06 RX ORDER — LIDOCAINE HYDROCHLORIDE 10 MG/ML
1 INJECTION, SOLUTION EPIDURAL; INFILTRATION; INTRACAUDAL; PERINEURAL ONCE
Status: COMPLETED | OUTPATIENT
Start: 2024-02-06 | End: 2024-02-06

## 2024-02-06 RX ORDER — LIDOCAINE HYDROCHLORIDE 20 MG/ML
INJECTION INTRAVENOUS
Status: DISCONTINUED | OUTPATIENT
Start: 2024-02-06 | End: 2024-02-06

## 2024-02-06 RX ORDER — ROCURONIUM BROMIDE 10 MG/ML
INJECTION, SOLUTION INTRAVENOUS
Status: DISCONTINUED | OUTPATIENT
Start: 2024-02-06 | End: 2024-02-06

## 2024-02-06 RX ORDER — FENTANYL CITRATE 50 UG/ML
INJECTION, SOLUTION INTRAMUSCULAR; INTRAVENOUS
Status: DISCONTINUED | OUTPATIENT
Start: 2024-02-06 | End: 2024-02-06

## 2024-02-06 RX ORDER — KETAMINE HCL IN 0.9 % NACL 50 MG/5 ML
SYRINGE (ML) INTRAVENOUS
Status: DISCONTINUED | OUTPATIENT
Start: 2024-02-06 | End: 2024-02-06

## 2024-02-06 RX ORDER — HEPARIN SODIUM 5000 [USP'U]/ML
5000 INJECTION, SOLUTION INTRAVENOUS; SUBCUTANEOUS EVERY 8 HOURS
Status: DISCONTINUED | OUTPATIENT
Start: 2024-02-07 | End: 2024-02-18 | Stop reason: HOSPADM

## 2024-02-06 RX ORDER — SODIUM CHLORIDE 0.9 % (FLUSH) 0.9 %
10 SYRINGE (ML) INJECTION
Status: DISCONTINUED | OUTPATIENT
Start: 2024-02-06 | End: 2024-02-06 | Stop reason: HOSPADM

## 2024-02-06 RX ORDER — CEFEPIME HYDROCHLORIDE 1 G/1
INJECTION, POWDER, FOR SOLUTION INTRAMUSCULAR; INTRAVENOUS
Status: DISCONTINUED | OUTPATIENT
Start: 2024-02-06 | End: 2024-02-06

## 2024-02-06 RX ORDER — MIRTAZAPINE 15 MG/1
30 TABLET, FILM COATED ORAL NIGHTLY
Status: DISCONTINUED | OUTPATIENT
Start: 2024-02-06 | End: 2024-02-10

## 2024-02-06 RX ORDER — PROPOFOL 10 MG/ML
VIAL (ML) INTRAVENOUS
Status: DISCONTINUED | OUTPATIENT
Start: 2024-02-06 | End: 2024-02-06

## 2024-02-06 RX ORDER — TALC
6 POWDER (GRAM) TOPICAL NIGHTLY PRN
Status: DISCONTINUED | OUTPATIENT
Start: 2024-02-06 | End: 2024-02-18 | Stop reason: HOSPADM

## 2024-02-06 RX ORDER — HYDROMORPHONE HYDROCHLORIDE 1 MG/ML
0.2 INJECTION, SOLUTION INTRAMUSCULAR; INTRAVENOUS; SUBCUTANEOUS EVERY 5 MIN PRN
Status: DISCONTINUED | OUTPATIENT
Start: 2024-02-06 | End: 2024-02-06 | Stop reason: HOSPADM

## 2024-02-06 RX ORDER — PROCHLORPERAZINE EDISYLATE 5 MG/ML
5 INJECTION INTRAMUSCULAR; INTRAVENOUS EVERY 6 HOURS PRN
Status: DISCONTINUED | OUTPATIENT
Start: 2024-02-06 | End: 2024-02-18 | Stop reason: HOSPADM

## 2024-02-06 RX ORDER — DEXMEDETOMIDINE HYDROCHLORIDE 100 UG/ML
INJECTION, SOLUTION INTRAVENOUS
Status: DISCONTINUED | OUTPATIENT
Start: 2024-02-06 | End: 2024-02-06

## 2024-02-06 RX ORDER — ACETAMINOPHEN 10 MG/ML
INJECTION, SOLUTION INTRAVENOUS
Status: DISCONTINUED | OUTPATIENT
Start: 2024-02-06 | End: 2024-02-06

## 2024-02-06 RX ORDER — OXYCODONE HYDROCHLORIDE 5 MG/1
5 TABLET ORAL EVERY 4 HOURS PRN
Status: CANCELLED | OUTPATIENT
Start: 2024-02-06

## 2024-02-06 RX ORDER — PREGABALIN 150 MG/1
150 CAPSULE ORAL NIGHTLY
Status: DISCONTINUED | OUTPATIENT
Start: 2024-02-06 | End: 2024-02-10

## 2024-02-06 RX ORDER — FENTANYL CITRATE 50 UG/ML
25-200 INJECTION, SOLUTION INTRAMUSCULAR; INTRAVENOUS EVERY 5 MIN PRN
Status: DISCONTINUED | OUTPATIENT
Start: 2024-02-06 | End: 2024-02-06

## 2024-02-06 RX ORDER — MIDAZOLAM HYDROCHLORIDE 1 MG/ML
.5-4 INJECTION INTRAMUSCULAR; INTRAVENOUS
Status: DISCONTINUED | OUTPATIENT
Start: 2024-02-06 | End: 2024-02-07

## 2024-02-06 RX ORDER — PHENYLEPHRINE HCL IN 0.9% NACL 1 MG/10 ML
SYRINGE (ML) INTRAVENOUS
Status: DISCONTINUED | OUTPATIENT
Start: 2024-02-06 | End: 2024-02-06

## 2024-02-06 RX ORDER — MUPIROCIN 20 MG/G
OINTMENT TOPICAL 2 TIMES DAILY
Status: COMPLETED | OUTPATIENT
Start: 2024-02-06 | End: 2024-02-08

## 2024-02-06 RX ORDER — SODIUM CHLORIDE, SODIUM LACTATE, POTASSIUM CHLORIDE, CALCIUM CHLORIDE 600; 310; 30; 20 MG/100ML; MG/100ML; MG/100ML; MG/100ML
INJECTION, SOLUTION INTRAVENOUS CONTINUOUS
Status: DISCONTINUED | OUTPATIENT
Start: 2024-02-06 | End: 2024-02-13

## 2024-02-06 RX ORDER — CETIRIZINE HYDROCHLORIDE 10 MG/1
10 TABLET ORAL DAILY
Status: DISCONTINUED | OUTPATIENT
Start: 2024-02-07 | End: 2024-02-10

## 2024-02-06 RX ORDER — ACETAMINOPHEN 10 MG/ML
1000 INJECTION, SOLUTION INTRAVENOUS ONCE
Status: COMPLETED | OUTPATIENT
Start: 2024-02-06 | End: 2024-02-06

## 2024-02-06 RX ORDER — ONDANSETRON HYDROCHLORIDE 2 MG/ML
4 INJECTION, SOLUTION INTRAVENOUS EVERY 6 HOURS PRN
Status: DISCONTINUED | OUTPATIENT
Start: 2024-02-06 | End: 2024-02-18 | Stop reason: HOSPADM

## 2024-02-06 RX ORDER — HYDROMORPHONE HYDROCHLORIDE 2 MG/ML
INJECTION, SOLUTION INTRAMUSCULAR; INTRAVENOUS; SUBCUTANEOUS
Status: DISCONTINUED | OUTPATIENT
Start: 2024-02-06 | End: 2024-02-06

## 2024-02-06 RX ORDER — SODIUM BICARBONATE 650 MG/1
1300 TABLET ORAL 2 TIMES DAILY
Status: DISCONTINUED | OUTPATIENT
Start: 2024-02-06 | End: 2024-02-10

## 2024-02-06 RX ORDER — OXYCODONE HYDROCHLORIDE 10 MG/1
10 TABLET ORAL EVERY 4 HOURS PRN
Status: CANCELLED | OUTPATIENT
Start: 2024-02-06

## 2024-02-06 RX ORDER — DEXAMETHASONE SODIUM PHOSPHATE 4 MG/ML
INJECTION, SOLUTION INTRA-ARTICULAR; INTRALESIONAL; INTRAMUSCULAR; INTRAVENOUS; SOFT TISSUE
Status: DISCONTINUED | OUTPATIENT
Start: 2024-02-06 | End: 2024-02-06

## 2024-02-06 RX ADMIN — HYDROMORPHONE HYDROCHLORIDE 0.2 MG: 1 INJECTION, SOLUTION INTRAMUSCULAR; INTRAVENOUS; SUBCUTANEOUS at 07:02

## 2024-02-06 RX ADMIN — SUCCINYLCHOLINE CHLORIDE 140 MG: 20 INJECTION, SOLUTION INTRAMUSCULAR; INTRAVENOUS at 07:02

## 2024-02-06 RX ADMIN — SODIUM CHLORIDE 0.35 MCG/KG/MIN: 9 INJECTION, SOLUTION INTRAVENOUS at 07:02

## 2024-02-06 RX ADMIN — ONDANSETRON 4 MG: 2 INJECTION INTRAMUSCULAR; INTRAVENOUS at 11:02

## 2024-02-06 RX ADMIN — Medication 10 MG: at 10:02

## 2024-02-06 RX ADMIN — DEXMEDETOMIDINE 12 MCG: 100 INJECTION, SOLUTION, CONCENTRATE INTRAVENOUS at 12:02

## 2024-02-06 RX ADMIN — Medication 200 MCG: at 07:02

## 2024-02-06 RX ADMIN — ROCURONIUM BROMIDE 10 MG: 10 INJECTION, SOLUTION INTRAVENOUS at 10:02

## 2024-02-06 RX ADMIN — ONDANSETRON 4 MG: 2 INJECTION INTRAMUSCULAR; INTRAVENOUS at 05:02

## 2024-02-06 RX ADMIN — SODIUM CHLORIDE, POTASSIUM CHLORIDE, SODIUM LACTATE AND CALCIUM CHLORIDE: 600; 310; 30; 20 INJECTION, SOLUTION INTRAVENOUS at 06:02

## 2024-02-06 RX ADMIN — ACETAMINOPHEN 1000 MG: 10 INJECTION, SOLUTION INTRAVENOUS at 07:02

## 2024-02-06 RX ADMIN — HYDROMORPHONE HYDROCHLORIDE 0.2 MG: 2 INJECTION INTRAMUSCULAR; INTRAVENOUS; SUBCUTANEOUS at 01:02

## 2024-02-06 RX ADMIN — HYDROMORPHONE HYDROCHLORIDE 0.2 MG: 1 INJECTION, SOLUTION INTRAMUSCULAR; INTRAVENOUS; SUBCUTANEOUS at 06:02

## 2024-02-06 RX ADMIN — LIDOCAINE HYDROCHLORIDE 10 MG: 10 INJECTION, SOLUTION EPIDURAL; INFILTRATION; INTRACAUDAL; PERINEURAL at 05:02

## 2024-02-06 RX ADMIN — SODIUM CHLORIDE, SODIUM GLUCONATE, SODIUM ACETATE, POTASSIUM CHLORIDE, MAGNESIUM CHLORIDE, SODIUM PHOSPHATE, DIBASIC, AND POTASSIUM PHOSPHATE: .53; .5; .37; .037; .03; .012; .00082 INJECTION, SOLUTION INTRAVENOUS at 01:02

## 2024-02-06 RX ADMIN — CEFEPIME 1 G: 1 INJECTION, POWDER, FOR SOLUTION INTRAMUSCULAR; INTRAVENOUS at 08:02

## 2024-02-06 RX ADMIN — ROCURONIUM BROMIDE 20 MG: 10 INJECTION, SOLUTION INTRAVENOUS at 08:02

## 2024-02-06 RX ADMIN — SODIUM CHLORIDE, SODIUM GLUCONATE, SODIUM ACETATE, POTASSIUM CHLORIDE, MAGNESIUM CHLORIDE, SODIUM PHOSPHATE, DIBASIC, AND POTASSIUM PHOSPHATE: .53; .5; .37; .037; .03; .012; .00082 INJECTION, SOLUTION INTRAVENOUS at 09:02

## 2024-02-06 RX ADMIN — ROCURONIUM BROMIDE 10 MG: 10 INJECTION, SOLUTION INTRAVENOUS at 12:02

## 2024-02-06 RX ADMIN — FENTANYL CITRATE 50 MCG: 50 INJECTION, SOLUTION INTRAMUSCULAR; INTRAVENOUS at 12:02

## 2024-02-06 RX ADMIN — VANCOMYCIN HYDROCHLORIDE 1250 MG: 1.25 INJECTION, POWDER, LYOPHILIZED, FOR SOLUTION INTRAVENOUS at 06:02

## 2024-02-06 RX ADMIN — ROCURONIUM BROMIDE 20 MG: 10 INJECTION, SOLUTION INTRAVENOUS at 02:02

## 2024-02-06 RX ADMIN — DEXAMETHASONE SODIUM PHOSPHATE 4 MG: 4 INJECTION, SOLUTION INTRAMUSCULAR; INTRAVENOUS at 11:02

## 2024-02-06 RX ADMIN — ACETAMINOPHEN 1000 MG: 10 INJECTION, SOLUTION INTRAVENOUS at 11:02

## 2024-02-06 RX ADMIN — HYDROMORPHONE HYDROCHLORIDE 0.2 MG: 2 INJECTION INTRAMUSCULAR; INTRAVENOUS; SUBCUTANEOUS at 12:02

## 2024-02-06 RX ADMIN — MUPIROCIN: 20 OINTMENT TOPICAL at 10:02

## 2024-02-06 RX ADMIN — HYDROMORPHONE HYDROCHLORIDE 1 MG: 1 INJECTION, SOLUTION INTRAMUSCULAR; INTRAVENOUS; SUBCUTANEOUS at 11:02

## 2024-02-06 RX ADMIN — HYDROMORPHONE HYDROCHLORIDE 0.2 MG: 2 INJECTION INTRAMUSCULAR; INTRAVENOUS; SUBCUTANEOUS at 05:02

## 2024-02-06 RX ADMIN — ROCURONIUM BROMIDE 10 MG: 10 INJECTION, SOLUTION INTRAVENOUS at 11:02

## 2024-02-06 RX ADMIN — VANCOMYCIN HYDROCHLORIDE 1.25 G: 1.25 INJECTION, POWDER, LYOPHILIZED, FOR SOLUTION INTRAVENOUS at 08:02

## 2024-02-06 RX ADMIN — Medication 20 MG: at 08:02

## 2024-02-06 RX ADMIN — HEPARIN SODIUM 5000 UNITS: 5000 INJECTION INTRAVENOUS; SUBCUTANEOUS at 11:02

## 2024-02-06 RX ADMIN — ROCURONIUM BROMIDE 10 MG: 10 INJECTION, SOLUTION INTRAVENOUS at 01:02

## 2024-02-06 RX ADMIN — DEXMEDETOMIDINE 8 MCG: 100 INJECTION, SOLUTION, CONCENTRATE INTRAVENOUS at 05:02

## 2024-02-06 RX ADMIN — SODIUM CHLORIDE, SODIUM GLUCONATE, SODIUM ACETATE, POTASSIUM CHLORIDE, MAGNESIUM CHLORIDE, SODIUM PHOSPHATE, DIBASIC, AND POTASSIUM PHOSPHATE: .53; .5; .37; .037; .03; .012; .00082 INJECTION, SOLUTION INTRAVENOUS at 07:02

## 2024-02-06 RX ADMIN — MIDAZOLAM HYDROCHLORIDE 2 MG: 2 INJECTION, SOLUTION INTRAMUSCULAR; INTRAVENOUS at 07:02

## 2024-02-06 RX ADMIN — HYDROMORPHONE HYDROCHLORIDE 0.2 MG: 2 INJECTION INTRAMUSCULAR; INTRAVENOUS; SUBCUTANEOUS at 03:02

## 2024-02-06 RX ADMIN — LIDOCAINE HYDROCHLORIDE 80 MG: 20 INJECTION INTRAVENOUS at 07:02

## 2024-02-06 RX ADMIN — Medication 10 MG: at 11:02

## 2024-02-06 RX ADMIN — ROCURONIUM BROMIDE 10 MG: 10 INJECTION, SOLUTION INTRAVENOUS at 04:02

## 2024-02-06 RX ADMIN — HYDROMORPHONE HYDROCHLORIDE 0.2 MG: 2 INJECTION INTRAMUSCULAR; INTRAVENOUS; SUBCUTANEOUS at 11:02

## 2024-02-06 RX ADMIN — ROCURONIUM BROMIDE 20 MG: 10 INJECTION, SOLUTION INTRAVENOUS at 09:02

## 2024-02-06 RX ADMIN — ROCURONIUM BROMIDE 60 MG: 10 INJECTION, SOLUTION INTRAVENOUS at 07:02

## 2024-02-06 RX ADMIN — FENTANYL CITRATE 25 MCG: 50 INJECTION, SOLUTION INTRAMUSCULAR; INTRAVENOUS at 09:02

## 2024-02-06 RX ADMIN — FENTANYL CITRATE 100 MCG: 50 INJECTION, SOLUTION INTRAMUSCULAR; INTRAVENOUS at 07:02

## 2024-02-06 RX ADMIN — CEFEPIME 1 G: 1 INJECTION, POWDER, FOR SOLUTION INTRAMUSCULAR; INTRAVENOUS at 03:02

## 2024-02-06 RX ADMIN — SODIUM CHLORIDE: 0.9 INJECTION, SOLUTION INTRAVENOUS at 07:02

## 2024-02-06 RX ADMIN — SUGAMMADEX 400 MG: 100 INJECTION, SOLUTION INTRAVENOUS at 05:02

## 2024-02-06 RX ADMIN — Medication 10 MG: at 09:02

## 2024-02-06 RX ADMIN — ROCURONIUM BROMIDE 20 MG: 10 INJECTION, SOLUTION INTRAVENOUS at 03:02

## 2024-02-06 RX ADMIN — PROPOFOL 160 MG: 10 INJECTION, EMULSION INTRAVENOUS at 07:02

## 2024-02-06 NOTE — ANESTHESIA PROCEDURE NOTES
Intubation    Date/Time: 2/6/2024 7:21 AM    Performed by: Andrew Fisher MD  Authorized by: Sanford Garcia MD    Intubation:     Induction:  Intravenous    Intubated:  Postinduction    Mask Ventilation:  Easy with oral airway    Attempts:  1    Attempted By:  Resident anesthesiologist    Method of Intubation:  Video laryngoscopy    Blade:  Martinez 3    Laryngeal View Grade: Grade IIA - cords partially seen      Difficult Airway Encountered?: No      Complications:  None    Airway Device:  Oral endotracheal tube    Airway Device Size:  7.0    Style/Cuff Inflation:  Cuffed (inflated to minimal occlusive pressure)    Inflation Amount (mL):  8    Tube secured:  21    Secured at:  The lips    Placement Verified By:  Capnometry    Complicating Factors:  None, anterior larynx, small mouth, narrow palate, large/floppy epiglottis, short neck and poor neck/head extension    Findings Post-Intubation:  BS equal bilateral and atraumatic/condition of teeth unchanged

## 2024-02-06 NOTE — H&P
History and Physical  Staff: Leslie Balbuena MD  02/06/2024     CHIEF COMPLAINT:     Mrs. Riley is a 58 y.o. female presenting for left ureterocolic anastamotic stricture    PRESENTING ILLNESS:     Edita Riley is a 58 y.o. female who presents with a history of radiation therapy for cervical cancer which was complicated by bilateral distal ureteral obstruction with bilateral hydronephrosis.  She is status post colon conduit which was performed in 2020.  This was complicated by large bowel perforation away from the anastamosis.  She has an ileostomy and a left percutaneous nephrostomy that was last exchanged on 10/26/2023.  She was admitted to the hospital on 12/7/2023 after her percutaneous nephrostomy tube was clogged and she had been feeling ill.  She was scheduled for revision but missed her appointment with Dr. Reynolds and had to be rescheduled.      REVIEW OF SYSTEMS:     Review of Systems   Constitutional: Negative.    HENT: Negative.     Eyes: Negative.    Respiratory: Negative.     Cardiovascular: Negative.    Gastrointestinal: Negative.    Genitourinary: Negative.    Musculoskeletal: Negative.    Skin: Negative.    Neurological: Negative.    Endo/Heme/Allergies: Negative.    Psychiatric/Behavioral: Negative.           PATIENT HISTORY:          Past Medical History:   Diagnosis Date    Abnormal mammogram 08/25/2020    Abnormal mammogram 08/25/2020    Acute blood loss anemia      Acute deep vein thrombosis (DVT) of lower extremity 12/09/2020    Advance care planning 04/30/2021    ODESSA (acute kidney injury) 03/21/2020    Anemia due to chronic blood loss      Anemia due to chronic kidney disease      Anemia due to chronic kidney disease 05/18/2020    Anxiety      Bilateral ureteral obstruction 09/11/2020    Bilious vomiting with nausea 06/11/2023    Cardiovascular event risk -- low 09/14/2015     ASCVD 10-year risk 1.9% (with optimal risk factors 1.3%) as of 9/14/2015     Cervical cancer 2014     Chronic back pain      Colostomy care      Deep vein thrombosis      Depression      Diarrhea due to malabsorption 11/14/2018    Difficult intubation      Discharge planning issues 04/30/2021    Disorder of kidney and ureter      DVT of lower extremity, bilateral 11/04/2020    Edema 04/10/2023    Emphysema of lung 04/10/2023    Fibromyalgia      Fungemia 09/27/2020    Generalized abdominal pain 08/25/2020    GERD (gastroesophageal reflux disease)      Hemifacial spasm 09/16/2015    Hiatal hernia 2014    History of cervical cancer 10/11/2018    History of essential hypertension 05/04/2015     Not requiring medications at this time  BP is low- concern that this may correlate with increased stool output from stoma. Encourage her to contact GI and her PCP.    Hx of psychiatric care       Cymbalta, trazodone    Hypertension      Hypomagnesemia 11/21/2018    Hyponatremia 09/10/2023    Impaired functional mobility, balance, gait, and endurance 07/24/2015    Lactose intolerance      Major depressive disorder, recurrent episode, moderate 06/02/2023    Metastatic squamous cell carcinoma to lymph node 10/11/2018    Moderate episode of recurrent major depressive disorder 04/21/2021    Nephrostomy complication 10/26/2023    Neuropathy due to chemotherapeutic drug 11/14/2018    Osteoarthritis of back      Peritonitis 09/22/2020    Physical deconditioning 04/30/2021    Pseudomonas urinary tract infection 04/21/2021    Psychiatric problem      Pyelitis 09/09/2023    QT prolongation 04/16/2021    Refusal of blood transfusions as patient is [employer]      Schatzki's ring 09/14/2015     Seen on outside EGD 05/2014, underwent esophageal dilatation. Bx were negative.     Seizure-like activity 12/02/2018    Seizures      Sleep stage dysfunction       Noted on PSG 06/2017; negative for obstructive sleep apnea     Stroke      Urinary tract infection associated with nephrostomy catheter 06/11/2020    Wound infection after surgery  09/24/2020               Past Surgical History:   Procedure Laterality Date    ANTEGRADE NEPHROSTOGRAPHY Bilateral 9/28/2020     Procedure: Nephrostogram - antegrade;  Surgeon: Leslie Balbuena MD;  Location: [facility] OR 1ST FLR;  Service: Urology;  Laterality: Bilateral;    ANTEGRADE NEPHROSTOGRAPHY Left 4/20/2021     Procedure: NEPHROSTOGRAM, ANTEGRADE;  Surgeon: Leslie Balbuena MD;  Location: [facility] OR 1ST FLR;  Service: Urology;  Laterality: Left;    ANTEGRADE NEPHROSTOGRAPHY Right 10/27/2022     Procedure: NEPHROSTOGRAM, ANTEGRADE;  Surgeon: Chirag Russ MD;  Location: [facility] OR 1ST FLR;  Service: Urology;  Laterality: Right;    ANTEGRADE NEPHROSTOGRAPHY Right 4/21/2023     Procedure: NEPHROSTOGRAM, ANTEGRADE;  Surgeon: Chirag Russ MD;  Location: [facility] OR 2ND FLR;  Service: Urology;  Laterality: Right;    ANTEGRADE NEPHROSTOGRAPHY Left 6/6/2023     Procedure: Nephrostogram - antegrade;  Surgeon: Leslie Balbuena MD;  Location: [facility] OR 1ST FLR;  Service: Urology;  Laterality: Left;    BILATERAL OOPHORECTOMY   2015    CHOLECYSTECTOMY   11/09/2016     Done at [facility], path showed chronic cholecystitis and gallstones    cold knife conization   2014    COLECTOMY, RIGHT   9/17/2020     Procedure: COLECTOMY, RIGHT Extended;  Surgeon: Hammad Reynolds MD;  Location: [facility] OR 2ND FLR;  Service: Colon and Rectal;;    COLONOSCOPY   2014    COLONOSCOPY N/A 10/24/2016     at [facility], Dr Thomas    COLONOSCOPY N/A 5/18/2018     Procedure: COLONOSCOPY;  Surgeon: Arden Gutiérrez MD;  Location: [facility] ENDO (4TH FLR);  Service: Endoscopy;  Laterality: N/A;    COLONOSCOPY N/A 7/28/2020     Procedure: COLONOSCOPY;  Surgeon: Hammad Reynolds MD;  Location: [facility] ENDO (4TH FLR);  Service: Colon and Rectal;  Laterality: N/A;  covid test [place] 7/25    CYSTOSCOPY WITH URETEROSCOPY, RETROGRADE PYELOGRAPHY, AND INSERTION OF STENT Bilateral 3/21/2020     Procedure: CYSTOSCOPY, WITH RETROGRADE PYELOGRAM,;  Surgeon: Leslie Balbuena MD;   Location: [facility] OR 1ST FLR;  Service: Urology;  Laterality: Bilateral;    DILATION OF NEPHROSTOMY TRACT Right 10/27/2022     Procedure: DILATION, NEPHROSTOMY TRACT;  Surgeon: Chirag Russ MD;  Location: [facility] OR 1ST FLR;  Service: Urology;  Laterality: Right;    ESOPHAGOGASTRODUODENOSCOPY   2014    ESOPHAGOGASTRODUODENOSCOPY   11/18/2020    ESOPHAGOGASTRODUODENOSCOPY N/A 11/18/2020     Procedure: ESOPHAGOGASTRODUODENOSCOPY (EGD);  Surgeon: Zenon Spencer MD;  Location: [facility] ENDO;  Service: Endoscopy;  Laterality: N/A;    ESOPHAGOGASTRODUODENOSCOPY N/A 12/11/2020     Procedure: EGD (ESOPHAGOGASTRODUODENOSCOPY);  Surgeon: Juancho Muse MD;  Location: [facility] (2ND FLR);  Service: Endoscopy;  Laterality: N/A;    HYSTERECTOMY   2014     [facility] for cervical cancer    ILEOSTOMY   9/17/2020     Procedure: CREATION, ILEOSTOMY;  Surgeon: Hammad Reynolds MD;  Location: [facility] OR 2ND FLR;  Service: Colon and Rectal;;    LOOPOGRAM N/A 4/20/2021     Procedure: LOOPOGRAM;  Surgeon: Leslie Balbuena MD;  Location: [facility] OR 1ST FLR;  Service: Urology;  Laterality: N/A;    LOOPOGRAM N/A 6/6/2023     Procedure: LOOPOGRAM;  Surgeon: Leslie Balbuena MD;  Location: [facility] OR 1ST FLR;  Service: Urology;  Laterality: N/A;    MOBILIZATION OF SPLENIC FLEXURE N/A 9/11/2020     Procedure: MOBILIZATION, COLONIC;  Surgeon: Hammad Reynolds MD;  Location: [facility] OR 2ND FLR;  Service: Colon and Rectal;  Laterality: N/A;    NEPHROSTOGRAPHY Bilateral 4/17/2021     Procedure: Nephrostogram;  Surgeon: Celeste Surgeon;  Location: [facility];  Service: Anesthesiology;  Laterality: Bilateral;    OOPHORECTOMY        PERCUTANEOUS NEPHROLITHOTOMY Right 4/21/2023     Procedure: NEPHROLITHOTOMY, PERCUTANEOUS;  Surgeon: Chirag Russ MD;  Location: [facility] OR 2ND FLR;  Service: Urology;  Laterality: Right;  2.5 hrs    PERCUTANEOUS NEPHROSTOMY   4/21/2023     Procedure: CREATION, NEPHROSTOMY, PERCUTANEOUS with removal of existing nephrostomy tube;   Surgeon: Chirag Russ MD;  Location: [facility] OR 2ND FLR;  Service: Urology;;    PYELOSCOPY Right 10/27/2022     Procedure: PYELOSCOPY;  Surgeon: Chirag Russ MD;  Location: [facility] OR 1ST FLR;  Service: Urology;  Laterality: Right;    REPLACEMENT OF NEPHROSTOMY TUBE Right 10/27/2022     Procedure: REPLACEMENT, NEPHROSTOMY TUBE;  Surgeon: Chirag Russ MD;  Location: [facility] OR 1ST FLR;  Service: Urology;  Laterality: Right;    RETROPERITONEAL LYMPHADENECTOMY Right 9/17/2018     Procedure: LYMPHADENECTOMY, RETROPERITONEUM;  Surgeon: Sebas Reed MD;  Location: [facility] OR 2ND FLR;  Service: General;  Laterality: Right;  ILIAC    URETEROSCOPIC REMOVAL OF URETERIC CALCULUS Right 10/27/2022     Procedure: REMOVAL, CALCULUS, URETER, URETEROSCOPIC;  Surgeon: hCirag Russ MD;  [facility];  Service: Urology;  Laterality: Right;    URETEROSCOPY Right 10/27/2022     Procedure: URETEROSCOPY-ANTEGRADE;  Surgeon: Chirag Russ MD;  [facility];  Service: Urology;  Laterality: Right;               Family History   Problem Relation Age of Onset    Heart disease Sister      Heart disease Maternal Grandmother      Colon cancer Father      Esophageal cancer Father      Cancer Father           Lung-smoker    Cancer Mother           Cervical    Cervical cancer Mother      Breast cancer Maternal Aunt      Suicide Daughter           jumped from parking structure    Drug abuse Daughter      Drug abuse Daughter        Social History               Socioeconomic History    Marital status:        Spouse name: Hammad    Number of children: 2   Tobacco Use    Smoking status: Never    Smokeless tobacco: Never   Substance and Sexual Activity    Alcohol use: No       Alcohol/week: 0.0 standard drinks of alcohol    Drug use: No    Sexual activity: Yes       Partners: Male       Birth control/protection: None       Comment:  19 years since 1999   Social History Narrative     ,  twin daughters (1  2018), disabled due to childhood stroke, [employer]      Social Determinants of Health           Financial Resource Strain: Low Risk  (2023)     Overall Financial Resource Strain (CARDIA)      Difficulty of Paying Living Expenses: Not hard at all   Recent Concern: Financial Resource Strain - Medium Risk (2023)     Overall Financial Resource Strain (CARDIA)      Difficulty of Paying Living Expenses: Somewhat hard   Food Insecurity: No Food Insecurity (2023)     Hunger Vital Sign      Worried About Running Out of Food in the Last Year: Never true      Ran Out of Food in the Last Year: Never true   Transportation Needs: No Transportation Needs (2023)     PRAPARE - Transportation      Lack of Transportation (Medical): No      Lack of Transportation (Non-Medical): No   Physical Activity: Insufficiently Active (2023)     Exercise Vital Sign      Days of Exercise per Week: 4 days      Minutes of Exercise per Session: 20 min   Stress: Stress Concern Present (2023)     [employer] - Occupational Stress Questionnaire      Feeling of Stress : To some extent   Social Connections: Socially Integrated (2023)     Social Connection and Isolation Panel [NHANES]      Frequency of Communication with Friends and Family: Three times a week      Frequency of Social Gatherings with Friends and Family: Once a week      Attends [employer] Services: 1 to 4 times per year      Active Member of Clubs or Organizations: No      Attends Club or Organization Meetings: 1 to 4 times per year      Marital Status:    Recent Concern: Social Connections - Moderately Isolated (2023)     Social Connection and Isolation Panel [NHANES]      Frequency of Communication with Friends and Family: Three times a week      Frequency of Social Gatherings with Friends and Family: Three times a week      Attends [employer] Services: Never      Active  Member of Clubs or Organizations: No      Attends Club or Organization Meetings: Never      Marital Status:    Housing Stability: Low Risk  (12/8/2023)     Housing Stability Vital Sign      Unable to Pay for Housing in the Last Year: No      Number of Places Lived in the Last Year: 1      Unstable Housing in the Last Year: No            Allergies:  Bee sting [allergen ext-venom-honey bee] and Grass pollen-bermuda, standard     Medications:  Encounter Medications          Outpatient Encounter Medications as of 12/18/2023   Medication Sig Dispense Refill    loratadine (CLARITIN) 10 mg tablet Take 10 mg by mouth daily as needed for Allergies.        mirtazapine (REMERON) 30 MG tablet Take 1 tablet (30 mg total) by mouth nightly. 30 tablet 11    ondansetron (ZOFRAN-ODT) 4 MG TbDL Dissolve 1 tablet (4 mg total) by mouth every 8 (eight) hours as needed (nausea). 15 tablet 0    sodium bicarbonate 650 MG tablet Take 2 tablets (1,300 mg total) by mouth 2 (two) times daily. 60 tablet 0      No facility-administered encounter medications on file as of 12/18/2023.               PHYSICAL EXAMINATION:     The patient generally appears in good health, is appropriately interactive, and is in no apparent distress.     Skin: No lesions.     Mental: Cooperative with normal affect.     Neuro: Grossly intact.     HEENT: Normal. No evidence of lymphadenopathy.     Chest:  normal inspiratory effort.     Abdomen: Soft, non-tender. No masses or organomegaly. Bladder is not palpable. No evidence of flank discomfort. No evidence of inguinal hernia.     Extremities: No clubbing, cyanosis, or edema     LABS:    Lab Results   Component Value Date    WBC 9.08 01/30/2024    HGB 13.2 01/30/2024    HCT 43.3 01/30/2024    MCV 92 01/30/2024     01/30/2024     Lab Results   Component Value Date     01/30/2024    K 3.6 01/30/2024     01/30/2024    CO2 18 (L) 01/30/2024    BUN 23 01/30/2024    CREATININE 1.7 (H) 01/30/2024     CALCIUM 9.8 01/30/2024    ANIONGAP 9 01/30/2024    EGFRNORACEVR 33.9 (A) 01/30/2024      Lab Results   Component Value Date    ALT 25 01/30/2024    AST 23 01/30/2024    ALKPHOS 146 (H) 01/30/2024    BILITOT 0.4 01/30/2024      Lab Results   Component Value Date    ALBUMIN 3.5 01/30/2024         IMPRESSION:     Ureteroenteric anastomotic stricture on the left side.      PLAN:     - To OR for loopogram, antegrade nephrostogram, exploratory laparotomy, lysis of adhesions, reimplantation of left ureter and possibly right ureter  - Consent updated with additional procedures. Patient consented to loopogram, nephrostogram, and possible repair on right side  - CRS to be present for ileostomy reversal  - Blood refusal form completed by patient    Patient seen in holding.  No changes in clinical condition.  Proceed with planned procedure.

## 2024-02-06 NOTE — BRIEF OP NOTE
[facility] - Surgery ([facility])  Brief Operative Note    SUMMARY     Surgery Date: 2/6/2024     Surgeon(s) and Role:  Panel 1:     * Leslie Balbuena MD - Primary     * Nolan Hartman MD - Resident - Assisting     * Salinas Bucio MD - Resident - Assisting  Panel 2:     * Hammad Reynolds MD - Primary     * Bill Mercado MD - Resident - Assisting        Pre-op Diagnosis:  Small bowel anastomotic stricture [K91.89, K91.30]    Post-op Diagnosis:  Post-Op Diagnosis Codes:     * Small bowel anastomotic stricture [K91.89, K91.30]    Procedure(s) (LRB):  REVISION, ANASTOMOSIS, URETEROILEAL (N/A)  OPEN LAPAROTOMY, EXPLORATORY (N/A)  CLOSURE, ILEOSTOMY (N/A)  ANASTOMOSIS, INTESTINE - Ileocolic anastomosis (N/A)  EXCISION, SMALL INTESTINE  LYSIS, ADHESIONS  LYSIS, ADHESIONS    Anesthesia: General    Implants:  Implant Name Type Inv. Item Serial No.  Lot No. LRB No. Used Action   STENT SET URET DIV 7FR 75CM - [id number]  STENT SET URET DIV 7FR 75CM  [email or web address] [id number] N/A 1 Implanted       Operative Findings: Extensive adhesions requiring lysis. Ileostomy was taken down. Short segment of small bowel resected due to deserosalizations. Remaining descending and portion of sigmoid colon resected. Side to side anti-peristaltic ileosigmoid anastomosis created. Anastomosis widely patent. Please see urology note for their portion.     Estimated Blood Loss: 300 mL    Estimated Blood Loss has been documented.         Specimens:   Specimen (24h ago, onward)       Start     Ordered    02/06/24 1701  Specimen to Pathology, Surgery General Surgery  Once        Comments: Pre-op Diagnosis: Small bowel anastomotic stricture [K91.89, K91.30]Procedure(s):REVISION, ANASTOMOSIS, URETEROILEALOPEN LAPAROTOMY, EXPLORATORYCLOSURE, ILEOSTOMYANASTOMOSIS, INTESTINE - Ileocolic anastomosis Number of specimens: 4Name of specimens: 1. Ileostomy - perm 2. Descending colon - perm 3. Small bowel. - perm 4. Distal ureter - perm     References:     Click here for ordering Quick Tip   Question Answer Comment   Procedure Type: General Surgery    Specimen Class: Known or suspected malignancy    Which provider would you like to cc? SINDI JONES    Which provider would you like to cc? INDRA NATARAJAN    Release to patient Immediate        02/06/24 1701    Pending  Specimen to Pathology, Surgery General Surgery  Once        Comments: Pre-op Diagnosis: Small bowel anastomotic stricture [K91.89, K91.30]Procedure(s):REVISION, ANASTOMOSIS, URETEROILEALOPEN LAPAROTOMY, EXPLORATORYCLOSURE, ILEOSTOMYANASTOMOSIS, INTESTINE - Ileocolic anastomosis Number of specimens: Name of specimens: 1)ileostomy-perm2)descending colon-perm3)small bowel-perm     References:    Click here for ordering Quick Tip   Question Answer Comment   Procedure Type: General Surgery    Specimen Class: Known or suspected malignancy    Which provider would you like to cc? INDRA NATARAJAN    Which provider would you like to cc? SINDI JONES    Release to patient Immediate        Pending                    [id number]

## 2024-02-06 NOTE — H&P
Colon and Rectal Surgery History and Physical    Patient name: Edita Riley   YOB: 1965   MRN: [id number]    Subjective:       Patient ID: Edita Riley is a 59 y.o. female.    Chief Complaint: No chief complaint on file.    HPI  58 yo F underwent transverse colon urinary conduit 9/2020 for due to bilateral distal ureteral strictures as a complication of pelvic XRT for cervical cancer - this procedure was complicated by an anastomotic leak from colo-colonic anastomosis at harvest site for the conduit, necessitating right colectomy/end ileostomy.  She had not followed up with me following that surgery but has been seeing our -ENT 's for stoma care.     She has developed a stricture at the anastomosis between he colonic conduit and the left ureter, causing obstriction, now has a left nephrostomy tube in place and has been having issues with recurrent pyelonephrosis.  She was scheduled for revision of her colonic conduit-ureteral anastomosis in November with a planned attempt at ileostomy takedown and ileocolic anastomosis at the same time, unfortunately that was delayed. She was admitted in December with pylenonephrosis which appeared to be due to clogged nephrostomy tube. That was irrigated at that time with good result. She had nephrostomy tube replaced on 2/2/24.    She has undergone preop workup and is ready for ileostomy takedown and revision of ureteral conduit.         Review of patient's allergies indicates:   Allergen Reactions    Bee sting [allergen ext-venom-honey bee]      Rash      Grass pollen-bermuda, standard      rash       Past Medical History:   Diagnosis Date    Abnormal mammogram 08/25/2020    Abnormal mammogram 08/25/2020    Acute blood loss anemia     Acute deep vein thrombosis (DVT) of lower extremity 12/09/2020    Advance care planning 04/30/2021    ODESSA (acute kidney injury) 03/21/2020    Anemia due to chronic blood loss     Anemia due to chronic kidney  disease     Anemia due to chronic kidney disease 05/18/2020    Anxiety     Bilateral ureteral obstruction 09/11/2020    Bilious vomiting with nausea 06/11/2023    Cardiovascular event risk -- low 09/14/2015    ASCVD 10-year risk 1.9% (with optimal risk factors 1.3%) as of 9/14/2015     Cervical cancer 2014    Chronic back pain     Colostomy care     Deep vein thrombosis     Depression     Diarrhea due to malabsorption 11/14/2018    Difficult intubation     Discharge planning issues 04/30/2021    Disorder of kidney and ureter     DVT of lower extremity, bilateral 11/04/2020    Edema 04/10/2023    Emphysema of lung 04/10/2023    Fibromyalgia     Fungemia 09/27/2020    Generalized abdominal pain 08/25/2020    GERD (gastroesophageal reflux disease)     Hemifacial spasm 09/16/2015    Hiatal hernia 2014    History of cervical cancer 10/11/2018    History of essential hypertension 05/04/2015    Not requiring medications at this time  BP is low- concern that this may correlate with increased stool output from stoma. Encourage her to contact GI and her PCP.    Hx of psychiatric care     Cymbalta, trazodone    Hypertension     Hypomagnesemia 11/21/2018    Hyponatremia 09/10/2023    Impaired functional mobility, balance, gait, and endurance 07/24/2015    Lactose intolerance     Major depressive disorder, recurrent episode, moderate 06/02/2023    Metastatic squamous cell carcinoma to lymph node 10/11/2018    Moderate episode of recurrent major depressive disorder 04/21/2021    Nephrostomy complication 10/26/2023    Neuropathy due to chemotherapeutic drug 11/14/2018    Osteoarthritis of back     Peritonitis 09/22/2020    Physical deconditioning 04/30/2021    Pseudomonas urinary tract infection 04/21/2021    Psychiatric problem     Pyelitis 09/09/2023    QT prolongation 04/16/2021    Refusal of blood transfusions as patient is Pentecostal     Altzki's ring 09/14/2015    Seen on outside EGD 05/2014, underwent esophageal  dilatation. Bx were negative.     Seizure-like activity 12/02/2018    Seizures     Sleep stage dysfunction     Noted on PSG 06/2017; negative for obstructive sleep apnea     Stroke     Urinary tract infection associated with nephrostomy catheter 06/11/2020    Wound infection after surgery 09/24/2020       Past Surgical History:   Procedure Laterality Date    ANTEGRADE NEPHROSTOGRAPHY Bilateral 9/28/2020    Procedure: Nephrostogram - antegrade;  Surgeon: Leslie Balbuena MD;  Location: [facility] OR 1ST FLR;  Service: Urology;  Laterality: Bilateral;    ANTEGRADE NEPHROSTOGRAPHY Left 4/20/2021    Procedure: NEPHROSTOGRAM, ANTEGRADE;  Surgeon: Leslie Balbuena MD;  Location: [facility] OR 1ST FLR;  Service: Urology;  Laterality: Left;    ANTEGRADE NEPHROSTOGRAPHY Right 10/27/2022    Procedure: NEPHROSTOGRAM, ANTEGRADE;  Surgeon: Chirag Russ MD;  Location: [facility] OR 1ST FLR;  Service: Urology;  Laterality: Right;    ANTEGRADE NEPHROSTOGRAPHY Right 4/21/2023    Procedure: NEPHROSTOGRAM, ANTEGRADE;  Surgeon: Chirag Russ MD;  Location: [facility] OR 2ND FLR;  Service: Urology;  Laterality: Right;    ANTEGRADE NEPHROSTOGRAPHY Left 6/6/2023    Procedure: Nephrostogram - antegrade;  Surgeon: Leslie Balbuena MD;  Location: [facility] OR 1ST FLR;  Service: Urology;  Laterality: Left;    BILATERAL OOPHORECTOMY  2015    CHOLECYSTECTOMY  11/09/2016    Done at [facility], path showed chronic cholecystitis and gallstones    cold knife conization  2014    COLECTOMY, RIGHT  9/17/2020    Procedure: COLECTOMY, RIGHT Extended;  Surgeon: Hammad Reynolds MD;  Location: [facility] OR 2ND FLR;  Service: Colon and Rectal;;    COLONOSCOPY  2014    [facility]    COLONOSCOPY N/A 5/18/2018    Procedure: COLONOSCOPY;  Surgeon: Arden Gutiérrez MD;  Location: [facility] (4TH FLR);  Service: Endoscopy;  Laterality: N/A;    COLONOSCOPY N/A 7/28/2020    Procedure: COLONOSCOPY;  Surgeon: Hammad Reynolds MD;  Location: [facility]  (4TH FLR);  Service: Colon and Rectal;  Laterality: N/A;  covid test [place] 7/25    CYSTOSCOPY WITH URETEROSCOPY, RETROGRADE PYELOGRAPHY, AND INSERTION OF STENT Bilateral 3/21/2020    Procedure: CYSTOSCOPY, WITH RETROGRADE PYELOGRAM,;  Surgeon: Leslie Balbuena MD;  Location: [facility] OR 1ST FLR;  Service: Urology;  Laterality: Bilateral;    DILATION OF NEPHROSTOMY TRACT Right 10/27/2022    Procedure: DILATION, NEPHROSTOMY TRACT;  Surgeon: Chirag Russ MD;  Location: [facility] OR 1ST FLR;  Service: Urology;  Laterality: Right;    ESOPHAGOGASTRODUODENOSCOPY  2014    ESOPHAGOGASTRODUODENOSCOPY  11/18/2020    ESOPHAGOGASTRODUODENOSCOPY N/A 11/18/2020    Procedure: ESOPHAGOGASTRODUODENOSCOPY (EGD);  Surgeon: Zenon Spencer MD;  Location: [facility];  Service: Endoscopy;  Laterality: N/A;    ESOPHAGOGASTRODUODENOSCOPY N/A 12/11/2020    Procedure: EGD (ESOPHAGOGASTRODUODENOSCOPY);  Surgeon: Juancho Muse MD;  Location: [facility] (2ND FLR);  Service: Endoscopy;  Laterality: N/A;    HYSTERECTOMY  2014    [facility] for cervical cancer    ILEOSTOMY  9/17/2020    Procedure: CREATION, ILEOSTOMY;  Surgeon: Hammad Reynolds MD;  Location: [facility] OR 2ND FLR;  Service: Colon and Rectal;;    LOOPOGRAM N/A 4/20/2021    Procedure: LOOPOGRAM;  Surgeon: Leslie Balbuena MD;  Location: [facility] OR 1ST FLR;  Service: Urology;  Laterality: N/A;    LOOPOGRAM N/A 6/6/2023    Procedure: LOOPOGRAM;  Surgeon: Leslie Balbuena MD;  Location: [facility] OR 1ST FLR;  Service: Urology;  Laterality: N/A;    MOBILIZATION OF SPLENIC FLEXURE N/A 9/11/2020    Procedure: MOBILIZATION, COLONIC;  Surgeon: Hammad Reynolds MD;  Location: [facility] OR 2ND FLR;  Service: Colon and Rectal;  Laterality: N/A;    NEPHROSTOGRAPHY Bilateral 4/17/2021    Procedure: Nephrostogram;  Surgeon: Celeste Surgeon;  Location: [facility];  Service: Anesthesiology;  Laterality: Bilateral;    OOPHORECTOMY      PERCUTANEOUS NEPHROLITHOTOMY Right 4/21/2023    Procedure: NEPHROLITHOTOMY,  PERCUTANEOUS;  Surgeon: Chirag Russ MD;  Location: [facility] OR 2ND FLR;  Service: Urology;  Laterality: Right;  2.5 hrs    PERCUTANEOUS NEPHROSTOMY  4/21/2023    Procedure: CREATION, NEPHROSTOMY, PERCUTANEOUS with removal of existing nephrostomy tube;  Surgeon: Chirag Russ MD;  [facility];  Service: Urology;;    PYELOSCOPY Right 10/27/2022    Procedure: PYELOSCOPY;  Surgeon: Chirag Russ MD;  [facility];  Service: Urology;  Laterality: Right;    REPLACEMENT OF NEPHROSTOMY TUBE Right 10/27/2022    Procedure: REPLACEMENT, NEPHROSTOMY TUBE;  Surgeon: Chirag Russ MD;  [facility];  Service: Urology;  Laterality: Right;    RETROPERITONEAL LYMPHADENECTOMY Right 9/17/2018    Procedure: LYMPHADENECTOMY, RETROPERITONEUM;  Surgeon: Sebas Reed MD;  [facility];  Service: General;  Laterality: Right;  ILIAC    URETEROSCOPIC REMOVAL OF URETERIC CALCULUS Right 10/27/2022    Procedure: REMOVAL, CALCULUS, URETER, URETEROSCOPIC;  Surgeon: Chirag Russ MD;  [facility];  Service: Urology;  Laterality: Right;    URETEROSCOPY Right 10/27/2022    Procedure: URETEROSCOPY-ANTEGRADE;  Surgeon: Chirag Russ MD;  Location: [facility] OR [address] [place] [address];  Service: Urology;  Laterality: Right;       Current Facility-Administered Medications   Medication Dose Route Frequency Provider Last Rate Last Admin    ceFEPIme (MAXIPIME) 1 g in dextrose 5 % in water (D5W) 100 mL IVPB (MB+)  1 g Intravenous Once Pre-Op Nolan Hartman MD        fentaNYL 50 mcg/mL injection  mcg   mcg Intravenous Q5 Min PRN Bill Yang MD        LIDOcaine (PF) 10 mg/ml (1%) injection 10 mg  1 mL Intradermal Once Nolan Hartman MD        midazolam (VERSED) 1 mg/mL injection 0.5-4 mg  0.5-4 mg Intravenous PRN Bill Yang MD        vancomycin 1,250 mg in dextrose 5 % (D5W) 250 mL IVPB (Vial-Mate)  1,250 mg Intravenous On Call Procedure Nolan Hartman  MD JOSE ANTONIO           Family History   Problem Relation Age of Onset    Heart disease Sister     Heart disease Maternal Grandmother     Colon cancer Father     Esophageal cancer Father     Cancer Father         Lung-smoker    Cancer Mother         Cervical    Cervical cancer Mother     Breast cancer Maternal Aunt     Suicide Daughter         jumped from parking structure    Drug abuse Daughter     Drug abuse Daughter     Rectal cancer Neg Hx     Stomach cancer Neg Hx     Crohn's disease Neg Hx     Ulcerative colitis Neg Hx     Diabetes Neg Hx     Hypertension Neg Hx        Social History     Socioeconomic History    Marital status:      Spouse name: Hammad    Number of children: 2   Tobacco Use    Smoking status: Never    Smokeless tobacco: Never   Substance and Sexual Activity    Alcohol use: No     Alcohol/week: 0.0 standard drinks of alcohol    Drug use: No    Sexual activity: Yes     Partners: Male     Birth control/protection: None     Comment:  19 years since    Social History Narrative    , twin daughters (1  2018), disabled due to childhood stroke, Sikhism sophia     Social Determinants of Health     Financial Resource Strain: Low Risk  (2023)    Overall Financial Resource Strain (CARDIA)     Difficulty of Paying Living Expenses: Not hard at all   Recent Concern: Financial Resource Strain - Medium Risk (2023)    Overall Financial Resource Strain (CARDIA)     Difficulty of Paying Living Expenses: Somewhat hard   Food Insecurity: No Food Insecurity (2023)    Hunger Vital Sign     Worried About Running Out of Food in the Last Year: Never true     Ran Out of Food in the Last Year: Never true   Transportation Needs: No Transportation Needs (2023)    PRAPARE - Transportation     Lack of Transportation (Medical): No     Lack of Transportation (Non-Medical): No   Physical Activity: Insufficiently Active (2023)    Exercise Vital Sign     Days of  "Exercise per Week: 4 days     Minutes of Exercise per Session: 20 min   Stress: Stress Concern Present (11/4/2023)    [place] [employer] - Occupational Stress Questionnaire     Feeling of Stress : To some extent   Social Connections: Socially Integrated (12/8/2023)    Social Connection and Isolation Panel [NHANES]     Frequency of Communication with Friends and Family: Three times a week     Frequency of Social Gatherings with Friends and Family: Once a week     Attends Yarsanism Services: 1 to 4 times per year     Active Member of Clubs or Organizations: No     Attends Club or Organization Meetings: 1 to 4 times per year     Marital Status:    Recent Concern: Social Connections - Moderately Isolated (9/11/2023)    Social Connection and Isolation Panel [NHANES]     Frequency of Communication with Friends and Family: Three times a week     Frequency of Social Gatherings with Friends and Family: Three times a week     Attends Yarsanism Services: Never     Active Member of Clubs or Organizations: No     Attends Club or Organization Meetings: Never     Marital Status:    Housing Stability: Low Risk  (12/8/2023)    Housing Stability Vital Sign     Unable to Pay for Housing in the Last Year: No     Number of Places Lived in the Last Year: 1     Unstable Housing in the Last Year: No       Review of Systems   Constitutional: Negative.    HENT: Negative.     Respiratory: Negative.     Cardiovascular: Negative.    Gastrointestinal: Negative.    Genitourinary: Negative.    Neurological: Negative.    Hematological: Negative.        Objective:      Physical Exam      Physical Exam:  BP (!) 103/56 (BP Location: Left arm, Patient Position: Lying)   Pulse 73   Temp 97.8 °F (36.6 °C) (Oral)   Resp 18   Ht 5' 9" (1.753 m)   Wt 82.1 kg (181 lb)   LMP 06/08/2014 (Approximate)   SpO2 100%   Breastfeeding No   BMI 26.73 kg/m²   General: no distress  Head: normocephalic  Neck: supple, " symmetrical, trachea midline  Lungs:  normal respiratory effort  Heart: regular rate and rhythm and no murmur  Abdomen: soft, nondistended, well healed incisions, ileostomy in place with output, ureteral conduit in place, nephrostomy tube draining urine  Extremities: no cyanosis or edema, or clubbing    Laboratory Studies:  Complete Blood Count:  Lab Results   Component Value Date/Time    WBC 9.08 01/30/2024 11:56 AM    HGB 13.2 01/30/2024 11:56 AM    HCT 43.3 01/30/2024 11:56 AM    HCT 43 12/12/2023 01:33 PM    RBC 4.72 01/30/2024 11:56 AM     01/30/2024 11:56 AM       Basic Chemistry Panel:  Lab Results   Component Value Date/Time     01/30/2024 11:56 AM    K 3.6 01/30/2024 11:56 AM     01/30/2024 11:56 AM    CO2 18 (L) 01/30/2024 11:56 AM    BUN 23 01/30/2024 11:56 AM    CREATININE 1.7 (H) 01/30/2024 11:56 AM    GLUCOSE 140 (H) 04/30/2020 05:13 AM    CALCIUM 9.8 01/30/2024 11:56 AM       Lab Results   Component Value Date/Time    CRP 1.1 12/13/2020 03:09 AM           Assessment:       1. Hydronephrosis of left kidney      60 yo F known to me - underwent transverse colon urinary conduit 9/2020 for due to bilateral distal ureteral strictures as a complication of pelvic XRT for cervical cancer - this procedure was complicated by an anastomotic leak from colo-colonic anastomosis at harvest site for the conduit, necessitating right colectomy/end ileostomy. Presents today for ileostomy takedown and revision of ureteral conduit  Plan:       To OR for ileostomy takedown w/ Dr. Reynolds and revision of ureteral conduit with Dr. Balbuena  Consents in chart     Bill Mercado MD  Colon & Rectal Fellow

## 2024-02-06 NOTE — ANESTHESIA PROCEDURE NOTES
Arterial    Diagnosis: anastamosis revision    Patient location during procedure: done in OR  Timeout: 2/6/2024 7:40 AM  Procedure end time: 2/6/2024 7:48 AM    Staffing  Authorizing Provider: Sanford Garcia MD  Performing Provider: Andrew Fisher MD    Staffing  Performed by: Andrew Fisher MD  Authorized by: Sanford Garcia MD    Anesthesiologist was present at the time of the procedure.    Preanesthetic Checklist  Completed: patient identified, IV checked, site marked, risks and benefits discussed, surgical consent, monitors and equipment checked, pre-op evaluation, timeout performed and anesthesia consent givenArterial  Skin Prep: chlorhexidine gluconate  Local Infiltration: none  Orientation: right  Location: radial    Catheter placement by Anatomical landmarks. Heme positive aspiration all ports. Insertion Attempts: 1  Assessment  Dressing: secured with tape and tegaderm  Patient: Tolerated well

## 2024-02-06 NOTE — OP NOTE
[facility]  Operative Note    Date: 02/06/2024    Pre-Op Diagnosis:  left ureterocolic anatomotic stricture  Left hydronephrosis  History of pelvic radiation  Presence of transverse colon conduit  Active Problem List with Overview Notes    Diagnosis Date Noted    Difficult intravenous access 01/29/2024    History of stroke 01/29/2024    Weakness 12/08/2023    Metabolic acidosis, NAG, bicarbonate losses 12/08/2023    Azotemia 12/07/2023    Allergic rhinitis due to pollen 12/07/2023    History of suicidal ideation 09/10/2023    Small bowel obstruction 08/19/2023    Arthritis 04/10/2023    Emphysema of lung 04/10/2023    Refusal of blood transfusions as patient is Dru[place] 04/10/2023    Migraine without aura and without status migrainosus, not intractable 02/17/2023    CKD (chronic kidney disease), stage III 02/14/2023    Preprocedural cardiovascular examination 10/25/2022    Nephrostomy status     Hard to intubate 04/20/2021    Presence of urostomy 02/08/2021    History of DVT (deep vein thrombosis) 02/03/2021    Ileostomy in place     Vitamin D deficiency 01/04/2021    Moderate malnutrition 09/20/2020    Radiation cystitis 09/04/2020    Family history of colon cancer 07/28/2020    Hydronephrosis 06/11/2020    Anemia in chronic kidney disease 05/18/2020    Neuropathy due to chemotherapeutic drug 11/14/2018    PTSD (post-traumatic stress disorder) 10/17/2018    History of cervical cancer 10/11/2018    Metastatic squamous cell carcinoma to lymph node 10/11/2018    Gastro-esophageal reflux disease with esophagitis 11/16/2017    Carpal tunnel syndrome on right 07/20/2017    Severe episode of recurrent major depressive disorder, with psychotic features 09/16/2015    Fibromyalgia 09/16/2015    Colon polyp 09/14/2015    Internal hemorrhoids 09/14/2015    Lower extremity pain, diffuse 07/24/2015    Weakness of both lower extremities 07/24/2015    Cervical cancer 05/04/2015    Osteoarthritis of back  05/04/2015    Essential hypertension 05/04/2015      Post-Op Diagnosis: same    Procedure(s) Performed:   1.  Exploratory laparotomy  2.  Lysis of adhesions   3.  Loopogram  4.  Antegrade nephrostogram  5.  Revision of left ureterocolic anastomosis/ left ureteral reimplant into transverse colon conduit    Modifier 22 - Due to history of prior abdominal surgery, history of bowel perforation and pelvic radiation, there was a significant amount of bowel adhesions requiring extensive lysis of adhesions that took >3 hours to complete.    Specimen(s): Distal left ureter    Staff Surgeon: Leslie Balbuena MD    Assistant Surgeon: Nolan Hartman MD; Salinas Bucio MD (Assist)    Anesthesia:  General endotracheal anesthesia    Indications: Edita Riley is a 59 y.o. female with a history of cervical cancer s/p XRT complicated by end-stage bladder and distal ureteral strictures. She underwent urinary diversion with a transverse colon conduit in 2020 complicated by a bowel perforation resulting in an ileostomy creation for GI diversion. She presents today for surgical repair of her left ureterocolic anastomotic stricture and ileostomy reversal.        Findings:    - Loopogram showing transverse colon conduit with good capacity lying caudally in patient's pelvis  - Right ureteral reflux confirming patency without stricture  - Left antegrade nephrostogram redemonstrating ureterocolic stricture at the level of L4  - Exploratory laparotomy revealing diffuse scarring and bowel adhesions. An extensive lysis of adhesions was performed to mobilize the small bowel and colon appropriately. Small enterotomy noted from cold scissor injury repaired with 3-0 vicryl interrupted  - Transverse colon conduit identified caudally and mobilized to identify ureters bilaterally entering into conduit  - Case turned over to CRS team (Dr. Reynolds) for additional lysis of adhesions and takedown of ileostomy  - Left ureterolysis and reimplant of  health ureter into healthy transverse colon conduit  - Drain placed  - Case turned over to CRS again for reconstitution of bowel and abdominal wall closure    Estimated Blood Loss: 300 mL    Drains:   - 12 Fr left nephrostomy tube (from prior)  - 6 Fr single J stent in left ureter  - 15 Fr ORESTES drain    Procedure in detail:  After informed consent was obtained and all questions were answered, the patient was brought to the operating suite and placed in the supine position.  General anesthesia was administered.  TEDs and SCDs were applied and working prior to induction of anesthesia.  Timeout was performed for the loopogram and nephrostogram. The colon conduit was prepped with betadine solution and a 16Fr red rubber was placed. Contrast was injected through a catheter tip syringe into the colon conduit and simultaneously a left nephrostogram was performed through her left nephrostomy tube. This redemonstrated a left ureteral stricture at the level of L4. Filling of the colon conduit appeared normal without signs of extravasation or conduit stricture. Right ureteral reflux was noted confirming patency of the right ureter. The red rubber was removed as well as the ileostomy appliance. The left nephrostomy tube was left clamped. That patient was then prepped and draped in the usual sterile fashion.  Time out was performed for the exlap and repair of her ureterocolic stricture and ileostomy takedown and preoperative antibiotics were confirmed.      We began with a midline incision along her prior incision. Dense scar was encountered which was incised with bovie electrocautery. Entry into the abdominal cavity was performed coldly with scissors ensuring no injury to the bowel or viscera below. Extensive bowel adhesions along the abdominal wall and mesentery was encountered. We then spent >2 hours performing lysis of adhesions to mobilize small and large bowel. Once adequate lysis was performed along the abdominal wall, a  bookwalter was placed for retraction. The colon conduit was identified, mobilized and protected. The left ureter was identified entering into the conduit. The CRS team was then called into the OR to assist with lysis of adhesions and their portion of the case pertaining to the ileostomy takedown. Please see their operative note for full details pertaining to this.    Once the ileostomy was taken down, the case was turned back over to us for the ureterolysis on the left side and a left ureteral reimplant. The left ureter was identified entering into the conduit. Circumferential control was obtained and ureterolysis was carried proximally to healthy ureter. The ureter was then tied off with PDS and ligated distally. The ureter was then spatulated to healthy lumen with prompt spillage of urine confirming patency. The transverse colon conduit was further mobilized and brought up to healthy ureter. An enterotomy was made in the conduit for the reimplant. We then performed a water tight, tension-free anastomosis of the left ureter to the conduit using 2 running 4-0 monocryl. Prior to complete closure, a blue single J stent was passed into the kidney and along through the conduit and out to the skin. The stent was secured to the skin with 3-0 chromic. The anastomosis was complete and tested with sterile saline in the conduit. No leak was identified. A 15 Fr jhon drain was placed in the LUQ and the case was turned back over to CRS for reconstitution of the small bowel. The abdominal wall was closed per CRS.    The nephrostomy tube was unclamped and a agnieszka bag was replaced. The nino catheter was removed from the conduit stoma. A new 2-piece urostomy appliance was applied to the stoma.    The patient tolerated the procedure well and was transferred to the PACU in stable condition.      Disposition:  The patient will be admitted for monitoring.     MD ISAAK Machado was present for the entire case and agree with  the above note.

## 2024-02-06 NOTE — PLAN OF CARE
Chart reviewed. Pre-op nursing care per orders. Safe surgery checklist complete. Family at bedside. Patient belongings given to . Type and screen sent to blood bank. BMP sent to lab.

## 2024-02-07 LAB
ANION GAP SERPL CALC-SCNC: 9 MMOL/L (ref 8–16)
BACTERIA UR CULT: ABNORMAL
BASOPHILS # BLD AUTO: 0.01 K/UL (ref 0–0.2)
BASOPHILS NFR BLD: 0.1 % (ref 0–1.9)
BUN SERPL-MCNC: 13 MG/DL (ref 8–23)
CALCIUM SERPL-MCNC: 8.3 MG/DL (ref 8.7–10.5)
CHLORIDE SERPL-SCNC: 117 MMOL/L (ref 95–110)
CO2 SERPL-SCNC: 15 MMOL/L (ref 23–29)
CREAT SERPL-MCNC: 1.4 MG/DL (ref 0.5–1.4)
DIFFERENTIAL METHOD BLD: ABNORMAL
EOSINOPHIL # BLD AUTO: 0 K/UL (ref 0–0.5)
EOSINOPHIL NFR BLD: 0 % (ref 0–8)
ERYTHROCYTE [DISTWIDTH] IN BLOOD BY AUTOMATED COUNT: 15.4 % (ref 11.5–14.5)
EST. GFR  (NO RACE VARIABLE): 42.8 ML/MIN/1.73 M^2
GLUCOSE SERPL-MCNC: 129 MG/DL (ref 70–110)
HCT VFR BLD AUTO: 40.1 % (ref 37–48.5)
HGB BLD-MCNC: 12.5 G/DL (ref 12–16)
IMM GRANULOCYTES # BLD AUTO: 0.05 K/UL (ref 0–0.04)
IMM GRANULOCYTES NFR BLD AUTO: 0.3 % (ref 0–0.5)
LYMPHOCYTES # BLD AUTO: 1 K/UL (ref 1–4.8)
LYMPHOCYTES NFR BLD: 6.9 % (ref 18–48)
MAGNESIUM SERPL-MCNC: 2 MG/DL (ref 1.6–2.6)
MCH RBC QN AUTO: 28.6 PG (ref 27–31)
MCHC RBC AUTO-ENTMCNC: 31.2 G/DL (ref 32–36)
MCV RBC AUTO: 92 FL (ref 82–98)
MONOCYTES # BLD AUTO: 1.2 K/UL (ref 0.3–1)
MONOCYTES NFR BLD: 8 % (ref 4–15)
NEUTROPHILS # BLD AUTO: 12.6 K/UL (ref 1.8–7.7)
NEUTROPHILS NFR BLD: 84.7 % (ref 38–73)
NRBC BLD-RTO: 0 /100 WBC
PHOSPHATE SERPL-MCNC: 4 MG/DL (ref 2.7–4.5)
PLATELET # BLD AUTO: 297 K/UL (ref 150–450)
PMV BLD AUTO: 9.5 FL (ref 9.2–12.9)
POTASSIUM SERPL-SCNC: 4.1 MMOL/L (ref 3.5–5.1)
RBC # BLD AUTO: 4.37 M/UL (ref 4–5.4)
SODIUM SERPL-SCNC: 141 MMOL/L (ref 136–145)
WBC # BLD AUTO: 14.87 K/UL (ref 3.9–12.7)

## 2024-02-07 PROCEDURE — 36415 COLL VENOUS BLD VENIPUNCTURE: CPT | Performed by: STUDENT IN AN ORGANIZED HEALTH CARE EDUCATION/TRAINING PROGRAM

## 2024-02-07 PROCEDURE — 63600175 PHARM REV CODE 636 W HCPCS

## 2024-02-07 PROCEDURE — 25000003 PHARM REV CODE 250

## 2024-02-07 PROCEDURE — 25000003 PHARM REV CODE 250: Performed by: STUDENT IN AN ORGANIZED HEALTH CARE EDUCATION/TRAINING PROGRAM

## 2024-02-07 PROCEDURE — 97161 PT EVAL LOW COMPLEX 20 MIN: CPT

## 2024-02-07 PROCEDURE — 80048 BASIC METABOLIC PNL TOTAL CA: CPT | Performed by: STUDENT IN AN ORGANIZED HEALTH CARE EDUCATION/TRAINING PROGRAM

## 2024-02-07 PROCEDURE — 84100 ASSAY OF PHOSPHORUS: CPT | Performed by: STUDENT IN AN ORGANIZED HEALTH CARE EDUCATION/TRAINING PROGRAM

## 2024-02-07 PROCEDURE — 63600175 PHARM REV CODE 636 W HCPCS: Performed by: PHYSICIAN ASSISTANT

## 2024-02-07 PROCEDURE — 25000003 PHARM REV CODE 250: Performed by: PHYSICIAN ASSISTANT

## 2024-02-07 PROCEDURE — 63600175 PHARM REV CODE 636 W HCPCS: Performed by: STUDENT IN AN ORGANIZED HEALTH CARE EDUCATION/TRAINING PROGRAM

## 2024-02-07 PROCEDURE — 20600001 HC STEP DOWN PRIVATE ROOM

## 2024-02-07 PROCEDURE — 97165 OT EVAL LOW COMPLEX 30 MIN: CPT

## 2024-02-07 PROCEDURE — 97535 SELF CARE MNGMENT TRAINING: CPT

## 2024-02-07 PROCEDURE — 99223 1ST HOSP IP/OBS HIGH 75: CPT | Mod: ,,, | Performed by: PHYSICIAN ASSISTANT

## 2024-02-07 PROCEDURE — 97116 GAIT TRAINING THERAPY: CPT

## 2024-02-07 PROCEDURE — 83735 ASSAY OF MAGNESIUM: CPT | Performed by: STUDENT IN AN ORGANIZED HEALTH CARE EDUCATION/TRAINING PROGRAM

## 2024-02-07 PROCEDURE — 85025 COMPLETE CBC W/AUTO DIFF WBC: CPT | Performed by: STUDENT IN AN ORGANIZED HEALTH CARE EDUCATION/TRAINING PROGRAM

## 2024-02-07 RX ORDER — OXYCODONE HYDROCHLORIDE 10 MG/1
10 TABLET ORAL EVERY 6 HOURS PRN
Status: DISCONTINUED | OUTPATIENT
Start: 2024-02-07 | End: 2024-02-15

## 2024-02-07 RX ORDER — HYDROMORPHONE HYDROCHLORIDE 1 MG/ML
1 INJECTION, SOLUTION INTRAMUSCULAR; INTRAVENOUS; SUBCUTANEOUS EVERY 6 HOURS PRN
Status: DISCONTINUED | OUTPATIENT
Start: 2024-02-07 | End: 2024-02-14

## 2024-02-07 RX ORDER — METHOCARBAMOL 500 MG/1
500 TABLET, FILM COATED ORAL 4 TIMES DAILY
Status: DISCONTINUED | OUTPATIENT
Start: 2024-02-07 | End: 2024-02-10

## 2024-02-07 RX ORDER — ACETAMINOPHEN 500 MG
1000 TABLET ORAL EVERY 8 HOURS
Status: DISCONTINUED | OUTPATIENT
Start: 2024-02-07 | End: 2024-02-10

## 2024-02-07 RX ORDER — OXYCODONE HYDROCHLORIDE 5 MG/1
5 TABLET ORAL EVERY 6 HOURS PRN
Status: DISCONTINUED | OUTPATIENT
Start: 2024-02-07 | End: 2024-02-15

## 2024-02-07 RX ADMIN — MUPIROCIN: 20 OINTMENT TOPICAL at 08:02

## 2024-02-07 RX ADMIN — METHOCARBAMOL 500 MG: 500 TABLET ORAL at 08:02

## 2024-02-07 RX ADMIN — SODIUM BICARBONATE 650 MG TABLET 1300 MG: at 10:02

## 2024-02-07 RX ADMIN — PREGABALIN 150 MG: 150 CAPSULE ORAL at 10:02

## 2024-02-07 RX ADMIN — MIRTAZAPINE 30 MG: 15 TABLET, FILM COATED ORAL at 10:02

## 2024-02-07 RX ADMIN — OXYCODONE HYDROCHLORIDE 10 MG: 10 TABLET ORAL at 10:02

## 2024-02-07 RX ADMIN — Medication 6 MG: at 10:02

## 2024-02-07 RX ADMIN — HEPARIN SODIUM 5000 UNITS: 5000 INJECTION INTRAVENOUS; SUBCUTANEOUS at 05:02

## 2024-02-07 RX ADMIN — ACETAMINOPHEN 1000 MG: 500 TABLET ORAL at 10:02

## 2024-02-07 RX ADMIN — ERTAPENEM 1 G: 1 INJECTION INTRAMUSCULAR; INTRAVENOUS at 02:02

## 2024-02-07 RX ADMIN — CETIRIZINE HYDROCHLORIDE 10 MG: 10 TABLET, FILM COATED ORAL at 08:02

## 2024-02-07 RX ADMIN — HYDROMORPHONE HYDROCHLORIDE 1 MG: 1 INJECTION, SOLUTION INTRAMUSCULAR; INTRAVENOUS; SUBCUTANEOUS at 11:02

## 2024-02-07 RX ADMIN — SODIUM BICARBONATE 650 MG TABLET 1300 MG: at 08:02

## 2024-02-07 RX ADMIN — SODIUM CHLORIDE, POTASSIUM CHLORIDE, SODIUM LACTATE AND CALCIUM CHLORIDE: 600; 310; 30; 20 INJECTION, SOLUTION INTRAVENOUS at 10:02

## 2024-02-07 RX ADMIN — HEPARIN SODIUM 5000 UNITS: 5000 INJECTION INTRAVENOUS; SUBCUTANEOUS at 10:02

## 2024-02-07 RX ADMIN — METHOCARBAMOL 500 MG: 500 TABLET ORAL at 10:02

## 2024-02-07 RX ADMIN — ACETAMINOPHEN 1000 MG: 500 TABLET ORAL at 01:02

## 2024-02-07 RX ADMIN — MUPIROCIN: 20 OINTMENT TOPICAL at 10:02

## 2024-02-07 RX ADMIN — METHOCARBAMOL 500 MG: 500 TABLET ORAL at 01:02

## 2024-02-07 RX ADMIN — HYDROMORPHONE HYDROCHLORIDE 1 MG: 1 INJECTION, SOLUTION INTRAMUSCULAR; INTRAVENOUS; SUBCUTANEOUS at 05:02

## 2024-02-07 RX ADMIN — METHOCARBAMOL 500 MG: 500 TABLET ORAL at 04:02

## 2024-02-07 RX ADMIN — HEPARIN SODIUM 5000 UNITS: 5000 INJECTION INTRAVENOUS; SUBCUTANEOUS at 01:02

## 2024-02-07 NOTE — PLAN OF CARE
[facility] Plan of Care Note    Dx : hydronephrosis of left kidney     Shift Events: Pain controlled     Goals of Care: Pain control    Neuro:  Disoriented to time, drowsy, a little slow to answer due to current state post anesthesia     Vital Signs: vss     Respiratory: RA     Diet: NPO      Is patient tolerating current diet? Yes      GTTS: LR     Urine Output/Bowel Movement: NO BM, urine output via nephrostomy and urostomy tube    Drains/Tubes/Tube Feeds (include total output/shift): ORESTES drain, urostomy, nephrostomy, NG tube       Lines: 18g L hand, and r wrist        Accuchecks:no     Skin: Palate flap     Fall Risk Score: see flowsheet     Activity level? independent     Any scheduled procedures? no     Any safety concerns? Infection                Problem: Adult Inpatient Plan of Care  Goal: Plan of Care Review  Outcome: Ongoing, Progressing  Goal: Patient-Specific Goal (Individualized)  Outcome: Ongoing, Progressing  Goal: Absence of Hospital-Acquired Illness or Injury  Outcome: Ongoing, Progressing  Goal: Optimal Comfort and Wellbeing  Outcome: Ongoing, Progressing  Goal: Readiness for Transition of Care  Outcome: Ongoing, Progressing     Problem: Fluid and Electrolyte Imbalance (Acute Kidney Injury/Impairment)  Goal: Fluid and Electrolyte Balance  Outcome: Ongoing, Progressing     Problem: Oral Intake Inadequate (Acute Kidney Injury/Impairment)  Goal: Optimal Nutrition Intake  Outcome: Ongoing, Progressing     Problem: Renal Function Impairment (Acute Kidney Injury/Impairment)  Goal: Effective Renal Function  Outcome: Ongoing, Progressing     Problem: Impaired Wound Healing  Goal: Optimal Wound Healing  Outcome: Ongoing, Progressing     Problem: Fall Injury Risk  Goal: Absence of Fall and Fall-Related Injury  Outcome: Ongoing, Progressing

## 2024-02-07 NOTE — OP NOTE
DATE OF PROCEDURE:  02/06/2024     PREOPERATIVE DIAGNOSES:    History of pelvic irradiation with subsequent bladder outlet obstruction  S/p creation of a colonic urinary conduit utilizing the transverse colon  History of colo-colonic anastomotic leak and peritonitis following colonic urinary conduit creation, s/p extended right colectomy with end ileostomy  Left ureter-colonic conduit anastomotic stricture     POSTOPERATIVE DIAGNOSES:    History of pelvic irradiation with subsequent bladder outlet obstruction  S/p creation of a colonic urinary conduit utilizing the transverse colon  History of colo-colonic anastomotic leak and peritonitis following colonic urinary conduit creation, s/p extended right colectomy with end ileostomy  Left ureter-colonic conduit anastomotic stricture  Extensive intra-abdominal adhesions     PROCEDURES PERFORMED:    End ileostomy takedown with ileosigmoid anastomosis  Small-bowel resection with primary anastomosis  Extensive adhesiolysis (2+ hours)     SURGEON:  Hammad Reynolds M.D.     ASSISTANT:  Bill Mercado M.D. [RES]     ANESTHESIA:  General endotracheal     Please see Dr. Balbuena's operative note for details regarding total IVF, EBL, UOP, and drains.  EBL for my portion of the procedure was negligible.     SPECIMENS:  Ileostomy  Descending colon  Small-bowel     COMPLICATIONS:  None for my portion of the procedure.     OPERATIVE FINDINGS:    Extensive intra-abdominal adhesions  Small-bowel enterotomy that had occurred during the urologic portion of the adhesiolysis - there was a large longitudinal serosal tear adjacent to this, necessitating resection of an 8 cm segment of small bowel with primary anastomosis.  Side-to-side anti peristaltic ileosigmoid anastomosis created     INDICATIONS:  The patient is a 59-year-old female with a history of cervical cancer treated by radiation which ultimately resulted in bladder outlet obstruction.  She underwent urinary diversion with a  transverse colon conduit in 2021.  This was complicated by a leak from the colo colonic anastomosis necessitating a right colectomy with creation of an end ileostomy.  She subsequently developed a stricture of the left ureteral colic anastomosis.  She presents today for revision of her left ureteral colic anastomosis, and she wished to have her ileostomy closed at the same setting.     DESCRIPTION OF PROCEDURE:   Dr. Balbuena started the procedure by opening the abdomen and beginning adhesiolysis.  At the time I was called to the operating room, the patient had been undergoing adhesiolysis for approximately 3 hours and still had significant adhesions.  I scrubbed in at this point to assist with the adhesiolysis.  We carefully freed up all of the small bowel adhesions from the ligament of Treitz to the point where the distal small bowel traversed the abdominal wall to become an ileostomy.  All interloop adhesions were carefully freed using sharp dissection.  Once we had freed of all the adhesions, which took approximately 2 hours, the ileostomy was taken down from the abdominal wall and delivered back into the abdominal cavity.  The distal 3-4 cm of small bowel at the site of the ostomy was excised by dividing the bowel with a CARMEN stapler and dividing the mesentery with the LigaSure device.  The resected ileostomy was passed from the field.  We then ran the small bowel from the ligament of Treitz to the divided distal end.  The small bowel did traverse underneath the mesenteric pedicle to the colonic conduit in the pelvis.  Several areas of serosal disruption had occurred during the initial adhesiolysis were repaired with 3-0 Vicryl seromuscular sutures.  During the initial abdominal entry and adhesiolysis performed by Dr Balbuena, a single enterotomy had occurred in the small bowel which she had repaired with a temporary closure.  We evaluated this area.  Adjacent to it there was another long, longitudinal serosal tear.   I felt that repairing both the enterotomy and the long serosal tear would compromise the lumen of the bowel, so I elected to resect this section of small bowel, which measured approximately 8 cm in length.  The small bowel proximal and distal to the areas of injury was divided with a CARMEN stapler.  The mesentery was divided with LigaSure device.  The small bowel was passed from the field.  A side-to-side functional end-to-end enteroenterostomy was then created between the 2 limbs of small bowel, maintaining proper mesenteric orientation, utilizing a 75 mm CARMEN to approximate the 2 limbs of bowel and a TA 60 stapler to close the common enterotomy.  The TA staple line was imbricated with 3-0 Vicryl seromuscular sutures.  A 3-0 Vicryl seromuscular suture was placed at the crotch of the anastomosis to relieve tension.  The mesenteric defect was closed with a running 3-0 Vicryl suture as well.  We ran the small bowel from the beginning to the end twice more to be sure that there were no missed small-bowel injuries.    We then identified the proximal end of the remaining left colon.  The descending and sigmoid colon were mobilized from their lateral peritoneal attachments.  The mesentery of the descending colon was divided with the LigaSure device.  We were able to deliver the left colon retrograde to where the urostomy traversed the abdominal wall on the left side.  At this point, we elected to remove the remaining descending colon, as it was quite atrophic.  The junction of the descending and sigmoid colon was divided with a CARMEN stapler.  The mesentery was divided with LigaSure device.  The resected descending colon was passed from the field.    At this point, Dr. Balbuena was called back to the operating room to perform revision of the left ureteral colic anastomosis.  Please see her operative note for details regarding that.  Following completion of this, I was called back to the operating room to complete the remaining  bowel anastomosis.  Maintaining proper mesenteric orientation, we then fashioned a side-to-side anti peristaltic anastomosis between the terminal ileum and the sigmoid colon, using a 75 mm CARMEN stapler to approximate the 2 limbs of bowel and a TA 60 stapler to close the common enterotomy.  The TA staple line was imbricated with 3-0 Vicryl seromuscular sutures.  A 3-0 Vicryl seromuscular suture was placed at the crotch of the anastomosis to relieve tension.  We once again ran the small bowel from the ligament of Treitz to the ileosigmoid anastomosis to ensure that there were no missed small-bowel injuries and that the mesentery was oriented properly.    At this point, attention was turned towards closing the abdominal wall.  The right-sided ileostomy trephination was closed with interrupted 0 Vicryl figure-of-eight sutures in the posterior and the anterior fascia.  The midline fascia was closed with a running looped 0 PDS suture.  Subcutaneous tissues were irrigated.  Hemostasis was noted to be adequate.  Several 3-0 Vicryl subdermal sutures were placed to relieve tension on the midline skin incision and the midline skin incision was then closed with surgical staples.  The right-sided ostomy trephination was partially closed at the skin level.  The central portion of the wound was packed with sterile gauze and dressed with a Bioclusive dressing.  Sterile dressings were placed over the midline incision.  A urostomy appliance was placed over the left-sided urostomy.    The patient tolerated the procedure well with no complications.  She was extubated in the Operating Room and taken to Recovery in satisfactory condition.  All needle, instrument and sponge counts were correct at the end of the case.  I was present throughout the entire procedure.    Hammad Reynolds MD, FACS, FASCRS  Senior Staff Surgeon  Department of Colon & Rectal Surgery     This note was created using voice recognition software, and may contain some  unrecognized transcriptional errors.

## 2024-02-07 NOTE — ASSESSMENT & PLAN NOTE
Edita Riley is a 59 y.o. female with a history of cervical cancer s/p XRT complicated by end-stage bladder and distal ureteral strictures. She underwent urinary diversion with a transverse colon conduit in 2020 complicated by a bowel perforation resulting in an ileostomy creation for GI diversion. She is now POD s/p surgical repair of her left ureterocolic anastomotic stricture and ileostomy reversal.         - Pain - mmpc  - Diet - npo  - mIVF  - will maintain drains   - NGT w/minimal output to LIWS, will cont. Appreciate crs recs.  - Nausea control w/ PRN antiemetics   - OOB, aim to ambulate today   - PT/OT  - Encourage IS  - DVT ppx: sqh   - daily labs  - restarting home meds as appropriate     - Dispo: continue inpatient care

## 2024-02-07 NOTE — NURSING
Nurses Note -- 4 Eyes      2/6/2024   9:06 PM      Skin assessed during: Admit      [] No Altered Skin Integrity Present    []Prevention Measures Documented      [x] Yes- Altered Skin Integrity Present or Discovered   [x] LDA Added if Not in Epic (Describe Wound)   [x] New Altered Skin Integrity was Present on Admit and Documented in LDA   [x] Wound Image Taken    Wound Care Consulted? Yes    Attending Nurse:  Nga Lovell RN/Staff Member:  francisco rn

## 2024-02-07 NOTE — PLAN OF CARE
Problem: Occupational Therapy  Goal: Occupational Therapy Goal  Description: Goals to be met by: 2/14/24    Patient will increase functional independence with ADLs by performing:    LE Dressing with Stand-by Assistance.  Grooming while standing at sink with Set-up Assistance.  Toileting from toilet with Stand-by Assistance for hygiene and clothing management.   Supine to sit with Supervision.  Toilet transfer to toilet with Supervision.    Outcome: Ongoing, Progressing

## 2024-02-07 NOTE — PT/OT/SLP EVAL
Occupational Therapy   Evaluation    Name: Edita Riley  MRN: [id number]  Admitting Diagnosis: Hydronephrosis of left kidney  Recent Surgery: Procedure(s) (LRB):  REVISION, ANASTOMOSIS, URETEROILEAL (N/A)  OPEN LAPAROTOMY, EXPLORATORY (N/A)  CLOSURE, ILEOSTOMY (N/A)  ANASTOMOSIS, INTESTINE - Ileocolic anastomosis (N/A)  EXCISION, SMALL INTESTINE  LYSIS, ADHESIONS  LYSIS, ADHESIONS 1 Day Post-Op    Recommendations:     Discharge Recommendations: No Therapy Indicated  Discharge Equipment Recommendations:  none  Barriers to discharge:  None    Assessment:     Edita Riley is a 59 y.o. female with a medical diagnosis of Hydronephrosis of left kidney.  She presents s/p small bowel resection and ostomy takedown on 2/6/24. Pt walked 160' SBA with a RW. Performance deficits affecting function: weakness, impaired balance, impaired endurance, decreased safety awareness, decreased coordination, impaired self care skills, impaired functional mobility, gait instability.      Rehab Prognosis: Good; patient would benefit from acute skilled OT services to address these deficits and reach maximum level of function.       Plan:     Patient to be seen 4 x/week to address the above listed problems via self-care/home management, therapeutic exercises, therapeutic activities, neuromuscular re-education  Plan of Care Expires:    Plan of Care Reviewed with: patient    Subjective     Occupational Profile:  Living Environment: Pt lives with her , 1st floor apartment, 3STE, R HR  Previous level of function: (I) with ADLs; Mod I with rolling walker  Roles and Routines:   Equipment Used at Home: wheelchair, walker, rolling  Assistance upon Discharge:     Pain/Comfort:  Pain Rating 1: 10/10  Location 1: abdomen    Patients cultural, spiritual, Sabianism conflicts given the current situation:      Objective:     Communicated with: rn prior to session.  Patient found supine with NG tube, pulse ox  (continuous), ORESTES drain, peripheral IV, colostomy upon OT entry to room.    General Precautions: Standard, fall  Orthopedic Precautions:    Braces:    Respiratory Status: Room air    Occupational Performance:    Bed Mobility:    Patient completed Scooting/Bridging with stand by assistance  Patient completed Supine to Sit with minimum assistance    Functional Mobility/Transfers:  Patient completed Sit <> Stand Transfer with moderate assistance  with  rolling walker   Patient completed Bed <> Chair Transfer using Step Transfer technique with stand by assistance with rolling walker  Functional Mobility: Pt walked 160' SBA with a RW.    Activities of Daily Living:  Feeding:  independence  Grooming: stand by assistance standing at the sink.    Cognitive/Visual Perceptual:  Cognitive/Psychosocial Skills:     -       Oriented to: Person, Place, Time, and Situation   -       Safety awareness/insight to disability: intact     Physical Exam:  Upper Extremity Range of Motion:     -       Right Upper Extremity: WNL  -       Left Upper Extremity: WNL  Upper Extremity Strength:    -       Right Upper Extremity: WNL  -       Left Upper Extremity: WNL    AMPAC 6 Click ADL:  AMPAC Total Score: 18    Treatment & Education:  UE ROM/MMT  Bed mobility training / assessment  Functional mobility assessment  Sit/standing balance assessment  Educated on importance of sitting OOB in bedside chair to promote increased strength, endurance & breathing.  Discussed OT POC / Post-acute plan    Patient left up in chair with all lines intact and call button in reach    GOALS:   Multidisciplinary Problems       Occupational Therapy Goals          Problem: Occupational Therapy    Goal Priority Disciplines Outcome Interventions   Occupational Therapy Goal     OT, PT/OT Ongoing, Progressing    Description: Goals to be met by: 2/14/24    Patient will increase functional independence with ADLs by performing:    LE Dressing with Stand-by  Assistance.  Grooming while standing at sink with Set-up Assistance.  Toileting from toilet with Stand-by Assistance for hygiene and clothing management.   Supine to sit with Supervision.  Toilet transfer to toilet with Supervision.                         History:     Past Medical History:   Diagnosis Date    Abnormal mammogram 08/25/2020    Abnormal mammogram 08/25/2020    Acute blood loss anemia     Acute deep vein thrombosis (DVT) of lower extremity 12/09/2020    Advance care planning 04/30/2021    ODESSA (acute kidney injury) 03/21/2020    Anemia due to chronic blood loss     Anemia due to chronic kidney disease     Anemia due to chronic kidney disease 05/18/2020    Anxiety     Bilateral ureteral obstruction 09/11/2020    Bilious vomiting with nausea 06/11/2023    Cardiovascular event risk -- low 09/14/2015    ASCVD 10-year risk 1.9% (with optimal risk factors 1.3%) as of 9/14/2015     Cervical cancer 2014    Chronic back pain     Colostomy care     Deep vein thrombosis     Depression     Diarrhea due to malabsorption 11/14/2018    Difficult intubation     Discharge planning issues 04/30/2021    Disorder of kidney and ureter     DVT of lower extremity, bilateral 11/04/2020    Edema 04/10/2023    Emphysema of lung 04/10/2023    Fibromyalgia     Fungemia 09/27/2020    Generalized abdominal pain 08/25/2020    GERD (gastroesophageal reflux disease)     Hemifacial spasm 09/16/2015    Hiatal hernia 2014    History of cervical cancer 10/11/2018    History of essential hypertension 05/04/2015    Not requiring medications at this time  BP is low- concern that this may correlate with increased stool output from stoma. Encourage her to contact GI and her PCP.    Hx of psychiatric care     Cymbalta, trazodone    Hypertension     Hypomagnesemia 11/21/2018    Hyponatremia 09/10/2023    Impaired functional mobility, balance, gait, and endurance 07/24/2015    Lactose intolerance     Major depressive disorder, recurrent episode,  moderate 06/02/2023    Metastatic squamous cell carcinoma to lymph node 10/11/2018    Moderate episode of recurrent major depressive disorder 04/21/2021    Nephrostomy complication 10/26/2023    Neuropathy due to chemotherapeutic drug 11/14/2018    Osteoarthritis of back     Peritonitis 09/22/2020    Physical deconditioning 04/30/2021    Pseudomonas urinary tract infection 04/21/2021    Psychiatric problem     Pyelitis 09/09/2023    QT prolongation 04/16/2021    Refusal of blood transfusions as patient is Congregational     Schatzki's ring 09/14/2015    Seen on outside EGD 05/2014, underwent esophageal dilatation. Bx were negative.     Seizure-like activity 12/02/2018    Seizures     Sleep stage dysfunction     Noted on PSG 06/2017; negative for obstructive sleep apnea     Stroke     Urinary tract infection associated with nephrostomy catheter 06/11/2020    Wound infection after surgery 09/24/2020         Past Surgical History:   Procedure Laterality Date    ANASTOMOSIS OF INTESTINE N/A 2/6/2024    Procedure: ANASTOMOSIS, INTESTINE - Ileocolic anastomosis;  Surgeon: Hammad Reynolds MD;  [facility];  Service: Colon and Rectal;  Laterality: N/A;    ANTEGRADE NEPHROSTOGRAPHY Bilateral 9/28/2020    Procedure: Nephrostogram - antegrade;  Surgeon: Leslie Balbuena MD;  [facility];  Service: Urology;  Laterality: Bilateral;    ANTEGRADE NEPHROSTOGRAPHY Left 4/20/2021    Procedure: NEPHROSTOGRAM, ANTEGRADE;  Surgeon: Leslie Balbuena MD;  Location: [facility] OR 1ST FLR;  Service: Urology;  Laterality: Left;    ANTEGRADE NEPHROSTOGRAPHY Right 10/27/2022    Procedure: NEPHROSTOGRAM, ANTEGRADE;  Surgeon: Chirag Russ MD;  Location: [facility] OR 1ST FLR;  Service: Urology;  Laterality: Right;    ANTEGRADE NEPHROSTOGRAPHY Right 4/21/2023    Procedure: NEPHROSTOGRAM, ANTEGRADE;  Surgeon: Chirag Russ MD;  Location: [facility] OR 2ND FLR;  Service: Urology;  Laterality: Right;    ANTEGRADE  NEPHROSTOGRAPHY Left 6/6/2023    Procedure: Nephrostogram - antegrade;  Surgeon: Leslie Balbuena MD;  Location: [facility] OR 1ST FLR;  Service: Urology;  Laterality: Left;    BILATERAL OOPHORECTOMY  2015    CHOLECYSTECTOMY  11/09/2016    Done at [facility], path showed chronic cholecystitis and gallstones    cold knife conization  2014    COLECTOMY, RIGHT  9/17/2020    Procedure: COLECTOMY, RIGHT Extended;  Surgeon: Hammad Reynolds MD;  Location: [facility] OR 2ND FLR;  Service: Colon and Rectal;;    COLONOSCOPY  2014    COLONOSCOPY N/A 10/24/2016    at [facility], Dr Gutiérrez    COLONOSCOPY N/A 5/18/2018    Procedure: COLONOSCOPY;  Surgeon: Arden Gutiérrez MD;  Location: [facility] (4TH FLR);  Service: Endoscopy;  Laterality: N/A;    COLONOSCOPY N/A 7/28/2020    Procedure: COLONOSCOPY;  Surgeon: Hammad Reynolds MD;  Location: [facility] (4TH FLR);  Service: Colon and Rectal;  Laterality: N/A;  covid test [place] 7/25    CYSTOSCOPY WITH URETEROSCOPY, RETROGRADE PYELOGRAPHY, AND INSERTION OF STENT Bilateral 3/21/2020    Procedure: CYSTOSCOPY, WITH RETROGRADE PYELOGRAM,;  Surgeon: Leslie Balbuena MD;  Location: [facility] OR 1ST FLR;  Service: Urology;  Laterality: Bilateral;    DILATION OF NEPHROSTOMY TRACT Right 10/27/2022    Procedure: DILATION, NEPHROSTOMY TRACT;  Surgeon: Chirag Russ MD;  [facility];  Service: Urology;  Laterality: Right;    ESOPHAGOGASTRODUODENOSCOPY  2014    ESOPHAGOGASTRODUODENOSCOPY  11/18/2020    ESOPHAGOGASTRODUODENOSCOPY N/A 11/18/2020    Procedure: ESOPHAGOGASTRODUODENOSCOPY (EGD);  Surgeon: Zenon Spencer MD;  Location: [facility];  Service: Endoscopy;  Laterality: N/A;    ESOPHAGOGASTRODUODENOSCOPY N/A 12/11/2020    Procedure: EGD (ESOPHAGOGASTRODUODENOSCOPY);  Surgeon: Juancho Muse MD;  Location: [facility] (2ND FLR);  Service: Endoscopy;  Laterality: N/A;    EXCISION, SMALL INTESTINE  2/6/2024    Procedure: EXCISION, SMALL INTESTINE;  Surgeon: Hammad Reynolds MD;  Location:  [facility] OR 2ND FLR;  Service: Colon and Rectal;;    HYSTERECTOMY  2014    [facility] for cervical cancer    ILEOSTOMY  9/17/2020    Procedure: CREATION, ILEOSTOMY;  Surgeon: Hammad Reynolds MD;  Location: [facility] OR 2ND FLR;  Service: Colon and Rectal;;    ILEOSTOMY CLOSURE N/A 2/6/2024    Procedure: CLOSURE, ILEOSTOMY;  Surgeon: Hammad Reynolds MD;  [facility]ND FLR;  Service: Colon and Rectal;  Laterality: N/A;    LAPAROTOMY, EXPLORATORY N/A 2/6/2024    Procedure: OPEN LAPAROTOMY, EXPLORATORY;  Surgeon: Leslie Balbuena MD;  [facility] 2ND FLR;  Service: Urology;  Laterality: N/A;  22 modifier    LOOPOGRAM N/A 4/20/2021    Procedure: LOOPOGRAM;  Surgeon: Leslie Balbuena MD;  [facility] 1ST FLR;  Service: Urology;  Laterality: N/A;    LOOPOGRAM N/A 6/6/2023    Procedure: LOOPOGRAM;  Surgeon: Leslie Balbuena MD;  [facility] 1ST FLR;  Service: Urology;  Laterality: N/A;    LYSIS OF ADHESIONS  2/6/2024    Procedure: LYSIS, ADHESIONS;  Surgeon: Leslie Balbuena MD;  Location: [facility] OR 2ND FLR;  Service: Urology;;    LYSIS OF ADHESIONS  2/6/2024    Procedure: LYSIS, ADHESIONS;  Surgeon: Hammad Reynolds MD;  [facility] 2ND FLR;  Service: Colon and Rectal;;    MOBILIZATION OF SPLENIC FLEXURE N/A 9/11/2020    Procedure: MOBILIZATION, COLONIC;  Surgeon: Hammad Reynolds MD;  [facility] 2ND FLR;  Service: Colon and Rectal;  Laterality: N/A;    NEPHROSTOGRAPHY Bilateral 4/17/2021    Procedure: Nephrostogram;  Surgeon: Celeste Surgeon;  Location: [facility];  Service: Anesthesiology;  Laterality: Bilateral;    OOPHORECTOMY      PERCUTANEOUS NEPHROLITHOTOMY Right 4/21/2023    Procedure: NEPHROLITHOTOMY, PERCUTANEOUS;  Surgeon: Chirag Russ MD;  [facility] 2ND FLR;  Service: Urology;  Laterality: Right;  2.5 hrs    PERCUTANEOUS NEPHROSTOMY  4/21/2023    Procedure: CREATION, NEPHROSTOMY, PERCUTANEOUS with removal of existing nephrostomy tube;  Surgeon: Chirag Russ MD;  Location:  [facility] OR 2ND FLR;  Service: Urology;;    PYELOSCOPY Right 10/27/2022    Procedure: PYELOSCOPY;  Surgeon: Chirag Russ MD;  [facility];  Service: Urology;  Laterality: Right;    REPLACEMENT OF NEPHROSTOMY TUBE Right 10/27/2022    Procedure: REPLACEMENT, NEPHROSTOMY TUBE;  Surgeon: Chirag Russ MD;  [facility];  Service: Urology;  Laterality: Right;    RETROPERITONEAL LYMPHADENECTOMY Right 9/17/2018    Procedure: LYMPHADENECTOMY, RETROPERITONEUM;  Surgeon: Sebas Reed MD;  [facility];  Service: General;  Laterality: Right;  ILIAC    REVISION OF ANASTOMOSIS OF URETER TO ILEAL CONDUIT N/A 2/6/2024    Procedure: REVISION, ANASTOMOSIS, URETEROILEAL;  Surgeon: Leslie Balbuena MD;  [facility];  Service: Urology;  Laterality: N/A;    URETEROSCOPIC REMOVAL OF URETERIC CALCULUS Right 10/27/2022    Procedure: REMOVAL, CALCULUS, URETER, URETEROSCOPIC;  Surgeon: Chirag Russ MD;  Location: [facility] OR [address] [place] [address];  Service: Urology;  Laterality: Right;    URETEROSCOPY Right 10/27/2022    Procedure: URETEROSCOPY-ANTEGRADE;  Surgeon: Chirag Russ MD;  Location: [address];  Service: Urology;  Laterality: Right;       Time Tracking:     OT Date of Treatment: 02/07/24  OT Start Time: 0923  OT Stop Time: 0955  OT Total Time (min): 32 min    Billable Minutes:Evaluation 12  Self Care/Home Management 20    2/7/2024

## 2024-02-07 NOTE — NURSING
Patient pulled out ng tube notified team. Second tube placed patient pulled this tube out as well. Denies being anxious. Educated on pulling tube out. Tube replaced wctm awaiting x ray

## 2024-02-07 NOTE — CONSULTS
[facility]  Wound Care    Patient Name:  Edita Riley   MRN:  [id number]  Date: 2/7/2024  Diagnosis: Hydronephrosis of left kidney    History:     Past Medical History:   Diagnosis Date    Abnormal mammogram 08/25/2020    Abnormal mammogram 08/25/2020    Acute blood loss anemia     Acute deep vein thrombosis (DVT) of lower extremity 12/09/2020    Advance care planning 04/30/2021    ODESSA (acute kidney injury) 03/21/2020    Anemia due to chronic blood loss     Anemia due to chronic kidney disease     Anemia due to chronic kidney disease 05/18/2020    Anxiety     Bilateral ureteral obstruction 09/11/2020    Bilious vomiting with nausea 06/11/2023    Cardiovascular event risk -- low 09/14/2015    ASCVD 10-year risk 1.9% (with optimal risk factors 1.3%) as of 9/14/2015     Cervical cancer 2014    Chronic back pain     Colostomy care     Deep vein thrombosis     Depression     Diarrhea due to malabsorption 11/14/2018    Difficult intubation     Discharge planning issues 04/30/2021    Disorder of kidney and ureter     DVT of lower extremity, bilateral 11/04/2020    Edema 04/10/2023    Emphysema of lung 04/10/2023    Fibromyalgia     Fungemia 09/27/2020    Generalized abdominal pain 08/25/2020    GERD (gastroesophageal reflux disease)     Hemifacial spasm 09/16/2015    Hiatal hernia 2014    History of cervical cancer 10/11/2018    History of essential hypertension 05/04/2015    Not requiring medications at this time  BP is low- concern that this may correlate with increased stool output from stoma. Encourage her to contact GI and her PCP.    Hx of psychiatric care     Cymbalta, trazodone    Hypertension     Hypomagnesemia 11/21/2018    Hyponatremia 09/10/2023    Impaired functional mobility, balance, gait, and endurance 07/24/2015    Lactose intolerance     Major depressive disorder, recurrent episode, moderate 06/02/2023    Metastatic squamous cell carcinoma to lymph node 10/11/2018    Moderate episode of  recurrent major depressive disorder 2021    Nephrostomy complication 10/26/2023    Neuropathy due to chemotherapeutic drug 2018    Osteoarthritis of back     Peritonitis 2020    Physical deconditioning 2021    Pseudomonas urinary tract infection 2021    Psychiatric problem     Pyelitis 2023    QT prolongation 2021    Refusal of blood transfusions as patient is Rastafari     Estephanieki's ring 2015    Seen on outside EGD 2014, underwent esophageal dilatation. Bx were negative.     Seizure-like activity 2018    Seizures     Sleep stage dysfunction     Noted on PSG 2017; negative for obstructive sleep apnea     Stroke     Urinary tract infection associated with nephrostomy catheter 2020    Wound infection after surgery 2020       Social History     Socioeconomic History    Marital status:      Spouse name: Hammad    Number of children: 2   Tobacco Use    Smoking status: Never    Smokeless tobacco: Never   Substance and Sexual Activity    Alcohol use: No     Alcohol/week: 0.0 standard drinks of alcohol    Drug use: No    Sexual activity: Yes     Partners: Male     Birth control/protection: None     Comment:  19 years since    Social History Narrative    , twin daughters (1  2018), disabled due to childhood stroke, Rastafari sophia     Social Determinants of Health     Financial Resource Strain: Medium Risk (2024)    Overall Financial Resource Strain (CARDIA)     Difficulty of Paying Living Expenses: Somewhat hard   Food Insecurity: No Food Insecurity (2024)    Hunger Vital Sign     Worried About Running Out of Food in the Last Year: Never true     Ran Out of Food in the Last Year: Never true   Transportation Needs: No Transportation Needs (2024)    PRAPARE - Transportation     Lack of Transportation (Medical): No     Lack of Transportation (Non-Medical): No   Physical Activity:  "Insufficiently Active (2/7/2024)    Exercise Vital Sign     Days of Exercise per Week: 4 days     Minutes of Exercise per Session: 20 min   Stress: Stress Concern Present (2/7/2024)    [employer] [place] [employer] - Occupational Stress Questionnaire     Feeling of Stress : To some extent   Social Connections: Moderately Integrated (2/7/2024)    Social Connection and Isolation Panel [NHANES]     Frequency of Communication with Friends and Family: Three times a week     Frequency of Social Gatherings with Friends and Family: Once a week     Attends Oriental orthodox Services: 1 to 4 times per year     Active Member of Clubs or Organizations: No     Attends Club or Organization Meetings: Never     Marital Status:    Housing Stability: Low Risk  (2/7/2024)    Housing Stability Vital Sign     Unable to Pay for Housing in the Last Year: No     Number of Places Lived in the Last Year: 1     Unstable Housing in the Last Year: No       Precautions:     Allergies as of 09/22/2023 - Reviewed 09/21/2023   Allergen Reaction Noted    Bee sting [allergen ext-venom-honey bee]  05/04/2015    Grass pollen-bermuda, standard  05/04/2015       WO Assessment Details/Treatment   Wound care consult received for urostomy care/education. Pt in bed with NG tube in place. The vale urostomy pouch is intact with a good seal. Pt nodded "yes" when asked if she had a urostomy previously and if she was independent in ostomy care. Reviewed the urostomy pouches and ostomy supplies kept at this facility. Supplies left at the bedside and pouch connected to a drainage bag. Provided reading materials regarding today's visit. Will follow-up tomorrow.     02/07/2024    "

## 2024-02-07 NOTE — NURSING TRANSFER
Nursing Transfer Note      2/6/2024       Nurse giving handoff:Herbert REYES    Nurse receiving handoff:Nga IYER    Reason patient is being transferred: Post procedure    Transfer To: Room 1060    Transfer via bed    Transfer with IV infusion    Transported by PCT Jay and Ivis    Transfer Vital Signs:see flowsheet    Telemetry: Box Number n/a    Order for Tele Monitor? No    Additional Lines: urostomy, nephrostomy    4eyes on Skin: no    Medicines sent: None    Any special needs or follow-up needed: Routine    Patient belongings transferred with patient:  None    Chart send with patient: Yes    Notified: spouse    Patient reassessed at: 2/6/2024 2015 (date, time)

## 2024-02-07 NOTE — ASSESSMENT & PLAN NOTE
58 year old female with history of cervical CA s/p chemotherapy and XRT c/b bilateral ureteral strictures requiring bilateral nephrostomy tubes (now only with left tube; right removed 4/2023), s/p urinary to colon conduit 2020 complicated by an anastomotic leak from colo-colonic anastomosis at harvest site for the conduit, necessitating right colectomy/end ileostomy, recurrent UTIs who has been admitted for planned surgery. On 2/6 she underwent surgical repair of her left ureterocolic anastomotic stricture and ileostomy reversal with urology and CRS. Afebrile post op. WBC 14K.  Urine culture is positive for Bacillus. ID consulted for recommendations.    Recommendations  Start IV Ertapenem for perioperative antibiotics. Would continue while she remains in the hospital.  Continue plan per urology and CRS  Discussed plan with ID staff. ID will follow.

## 2024-02-07 NOTE — HPI
Edita Riley is a 58 year old female with history of cervical CA s/p chemotherapy and XRT c/b bilateral ureteral strictures requiring bilateral nephrostomy tubes (now only with left tube; right removed 4/2023), s/p urinary to colon conduit 2020 complicated by an anastomotic leak from colo-colonic anastomosis at harvest site for the conduit, necessitating right colectomy/end ileostomy, recurrent UTIs who has been admitted for planned surgery. She was scheduled for revision of her colonic conduit-ureteral anastomosis in November with a planned attempt at ileostomy takedown and ileocolic anastomosis at the same time, unfortunately that was delayed due to recurrent hospital admissions.     On 2/6, she underwent ileostomy takedown, ileosigmoid anastomosis,  lysis of adhesion, revision of left ureterocolic anastomosis/ left ureteral reimplant into transverse colon conduit  with urology and CRS. Afebrile post op. WBC 14K.  Urine culture is positive for Bacillus. ID consulted for recommendations.

## 2024-02-07 NOTE — SUBJECTIVE & OBJECTIVE
Interval History: NAOE, AFVSS, reports feeling okay but some pain today, denies n/v but has not passed flatus yet, has not ambulated yet     LNT  /200   Urostomy  /215  UOP /585  ORESTES /50 SS  NGT  /5    Objective:     Temp:  [97.5 °F (36.4 °C)-98.6 °F (37 °C)] 98.2 °F (36.8 °C)  Pulse:  [71-88] 71  Resp:  [10-20] 20  SpO2:  [96 %-100 %] 100 %  BP: (110-149)/(63-78) 110/63     Body mass index is 26.7 kg/m².    Date 02/07/24 0700 - 02/08/24 0659   Shift 7106-1643 5413-1453 3169-3517 24 Hour Total   INTAKE   Shift Total(mL/kg)       OUTPUT   Urine(mL/kg/hr) 125   125   Shift Total(mL/kg) 125(1.5)   125(1.5)   Weight (kg) 82 82 82 82          Drains       Drain  Duration                  Suprapubic Catheter 100% silicone 16 Fr. -- days         Nephrostomy 02/02/24 1255 Left 12 Fr. 4 days         Closed/Suction Drain 02/06/24 Tube - 1 Midline Abdomen Bulb 15 Fr. 1 day         NG/OG Tube 02/06/24 0748 nasogastric <1 day         Urostomy 02/06/24 1815 ureterostomy, left <1 day                     Physical Exam  Constitutional:       General: She is not in acute distress.     Appearance: Normal appearance.   HENT:      Head: Normocephalic and atraumatic.      Nose: Nose normal.      Comments: NGT in place to LIWS, minimal green output  Eyes:      Conjunctiva/sclera: Conjunctivae normal.   Cardiovascular:      Rate and Rhythm: Normal rate.   Pulmonary:      Effort: Pulmonary effort is normal. No respiratory distress.   Abdominal:      General: There is no distension.      Palpations: Abdomen is soft.      Comments: Incision CDI  Abdomen appropriately ttp   Urostomy in place, stent appears appropriately placed, urostomy with CYU output, no surrounding erythema/signs of dehiscence/signs of infection  Prior ileostomy site with packing in place, no significant discharge or purulence  L NT in place with cyu  ORESTES in place w/ss output    Musculoskeletal:         General: No deformity.   Skin:     General: Skin is warm and dry.    Neurological:      General: No focal deficit present.      Mental Status: She is alert.   Psychiatric:         Mood and Affect: Mood normal.         Behavior: Behavior normal.           Significant Labs:    BMP:  Recent Labs   Lab 02/06/24  0551 02/06/24  1836 02/07/24  0242    141 141   K 3.8 4.1 4.1   * 118* 117*   CO2 15* 12* 15*   BUN 13 11 13   CREATININE 1.4 1.4 1.4   CALCIUM 9.8 7.4* 8.3*       CBC:   Recent Labs   Lab 02/06/24  1836 02/07/24  0242   WBC 16.20* 14.87*   HGB 13.0 12.5   HCT 42.0 40.1    297       All pertinent labs results from the past 24 hours have been reviewed.    Significant Imaging:  All pertinent imaging results/findings from the past 24 hours have been reviewed.

## 2024-02-07 NOTE — TRANSFER OF CARE
"Anesthesia Transfer of Care Note    [facility]    Procedure(s) Performed: Procedure(s) (LRB):  REVISION, ANASTOMOSIS, URETEROILEAL (N/A)  OPEN LAPAROTOMY, EXPLORATORY (N/A)  CLOSURE, ILEOSTOMY (N/A)  ANASTOMOSIS, INTESTINE - Ileocolic anastomosis (N/A)  EXCISION, SMALL INTESTINE  LYSIS, ADHESIONS  LYSIS, ADHESIONS    Patient location: PACU    Anesthesia Type: general    Transport from OR: Transported from OR on 6-10 L/min O2 by face mask with adequate spontaneous ventilation    Post pain: adequate analgesia    Post assessment: no apparent anesthetic complications    Post vital signs: stable    Level of consciousness: responds to stimulation    Nausea/Vomiting: no nausea/vomiting    Complications: none    Transfer of care protocol was followed      Last vitals: Visit Vitals  BP (!) 103/56 (BP Location: Left arm, Patient Position: Lying)   Pulse 73   Temp 36.6 °C (97.8 °F) (Oral)   Resp 18   Ht 5' 9" (1.753 m)   Wt 82.1 kg (181 lb)   LMP 06/08/2014 (Approximate)   SpO2 100%   Breastfeeding No   BMI 26.73 kg/m²     "

## 2024-02-07 NOTE — PROGRESS NOTES
Colon and Rectal Surgery Progress Note    Patient name: Edita Riley   YOB: 1965   MRN: [id number]    Patient Name: Edita Riley  Date: 2/7/2024    Subjective:  OR yesterday. NAEO. Some pain overnight, medications do take the edge of but pain returns quickly. Denies nausea. Minimal out of NG. No BM or flatus yet. Good UOP.    Medications:    Current Facility-Administered Medications:     [COMPLETED] acetaminophen 1,000 mg/100 mL (10 mg/mL) injection 1,000 mg, 1,000 mg, Intravenous, Once, Stopped at 02/06/24 1937 **FOLLOWED BY** acetaminophen tablet 1,000 mg, 1,000 mg, Oral, Q8H, Nolan Hartman MD    ceFEPIme (MAXIPIME) 1 g in dextrose 5 % in water (D5W) 100 mL IVPB (MB+), 1 g, Intravenous, Once, Andrew Fisher MD    cetirizine tablet 10 mg, 10 mg, Oral, Daily, Nolan Hartman MD    heparin (porcine) injection 5,000 Units, 5,000 Units, Subcutaneous, Q8H, Nolan Hartman MD, 5,000 Units at 02/07/24 0539    HYDROmorphone injection 0.5 mg, 0.5 mg, Intravenous, Q6H PRN, Nolan Hartman MD    HYDROmorphone injection 1 mg, 1 mg, Intravenous, Q6H PRN, Nolan Hartman MD, 1 mg at 02/07/24 0539    lactated ringers infusion, , Intravenous, Continuous, Nolan Hartman MD, Last Rate: 125 mL/hr at 02/07/24 0331, Rate Verify at 02/07/24 0331    melatonin tablet 6 mg, 6 mg, Oral, Nightly PRN, Nolan Hartman MD    midazolam (VERSED) 1 mg/mL injection 0.5-4 mg, 0.5-4 mg, Intravenous, PRN, Bill Yang MD, 2 mg at 02/06/24 0704    mirtazapine tablet 30 mg, 30 mg, Oral, Nightly, Nolan Hartman MD    mupirocin 2 % ointment, , Nasal, BID, Nolan Hartman MD, Given at 02/06/24 2206    ondansetron injection 4 mg, 4 mg, Intravenous, Q6H PRN, oNlan Hartman MD, 4 mg at 02/06/24 2350    pregabalin capsule 150 mg, 150 mg, Oral, QHS, Nolan Hartman MD    prochlorperazine injection Soln 5 mg, 5 mg, Intravenous, Q6H PRN, Nolan Hartman MD    sodium bicarbonate tablet  1,300 mg, 1,300 mg, Oral, BID, Nolan Hartman MD    sodium chloride 0.9% flush 10 mL, 10 mL, Intravenous, PRN, Nolan Hartman MD    Vital Signs:  Vitals:    02/07/24 0539   BP:    Pulse:    Resp: 18   Temp:        In/Out:  Intake/Output - Last 3 Shifts         02/05 0700 02/06 0659 02/06 0700 02/07 0659    P.O.  0    I.V. (mL/kg)  997.4 (12.2)    IV Piggyback  6293.7    Total Intake(mL/kg)  7291.1 (88.9)    Urine (mL/kg/hr)  1000 (0.5)    Emesis/NG output  0    Drains  55    Stool  0    Blood  300    Total Output  1355    Net  +5936.1          Urine Occurrence  1 x    Stool Occurrence  0 x    Emesis Occurrence  0 x            Physical Exam:  General: Alert, oriented, in no apparent distress  HEENT: Sclera anicteric, trachea midline  Lungs: Normal respiratory rate and effort on room air  Abdomen: Soft, appropriate socorro-incisional TTP, nondistended. Incisions clean/dry/intact without erythema or drainage. ORESTES drains SS output. Previous ileostomy site with packing in place. Urostomy with stent in place.   Extremities: Warm, well perfused, no edema, SCDs in place  Neuro: Grossly intact, moves all extremities  Psych: Appropriate affect    Laboratory Studies:  Complete Blood Count:  Lab Results   Component Value Date/Time    WBC 14.87 (H) 02/07/2024 02:42 AM    HGB 12.5 02/07/2024 02:42 AM    HCT 40.1 02/07/2024 02:42 AM    HCT 43 12/12/2023 01:33 PM    RBC 4.37 02/07/2024 02:42 AM     02/07/2024 02:42 AM       Basic Chemistry Panel:  Lab Results   Component Value Date/Time     02/07/2024 02:42 AM    K 4.1 02/07/2024 02:42 AM     (H) 02/07/2024 02:42 AM    CO2 15 (L) 02/07/2024 02:42 AM    BUN 13 02/07/2024 02:42 AM    CREATININE 1.4 02/07/2024 02:42 AM    GLUCOSE 140 (H) 04/30/2020 05:13 AM    CALCIUM 8.3 (L) 02/07/2024 02:42 AM       Lab Results   Component Value Date/Time    CRP 1.1 12/13/2020 03:09 AM       Imaging Studies:  None new    Assessment:  Edita iRley is a 59 y.o. year  old female with history of ureteral stenosis after radiation for cervical cancer requiring urostomy (transverse colon conduit) complicated by anastomotic leak requiring right colectomy and end ileostomy now s/p ex lap, extensive maliha, revision of urostomy, ileostomy reversal w/ ileosigmoid anastomosis on 2/6    Plan:  POD1  Doing ok, working on pain control today  Prn anti-emetics  NG in place, minimal out. Would keep today, Very high risk for ileus given length of operation and amount of adhesiolysis. Continue NPO, ok for ice chips for comfort  Ambulate, IS  AROBF  Will remove packing from old ileostomy site tomorrow  Labs appropriate  Rest per primary    Bill Mercado MD  Colorectal Surgery Fellow

## 2024-02-07 NOTE — PLAN OF CARE
[facility] [facility]  Initial Discharge Assessment       Primary Care Provider: Trina Vu MD    Admission Diagnosis: Small bowel anastomotic stricture [K91.89, K91.30]  Hydronephrosis of left kidney [N13.30]    Admission Date: 2/6/2024  Expected Discharge Date:     Transition of Care Barriers: None    Payor: MEDICAID / Plan: [email or web address] [facility] ([employer]) / Product Type: Managed Medicaid /     Extended Emergency Contact Information  Primary Emergency Contact: Hammad Riley  Address: [address]           [place] [address] [facility]  Home Phone: [phone]  Work Phone: [phone]  Mobile Phone: [phone]  Relation: Spouse  Secondary Emergency Contact: Federico Riley  Mobile Phone: [phone]  Relation: Daughter    Discharge Plan A: Home  Discharge Plan B: Home with family      [facility]/pharmacy #[id number] - [place] - [address]  [address]  [place] [address]  Phone: [phone] Fax: [phone]    [facility]  [address]  [facility] [address]  Phone: [phone] Fax: [phone]      Initial Assessment (most recent)       Adult Discharge Assessment - 02/07/24 1125          Discharge Assessment    Assessment Type Discharge Planning Assessment     Confirmed/corrected address, phone number and insurance Yes     Confirmed Demographics Correct on Facesheet     Source of Information patient     Does patient/caregiver understand observation status Yes     Communicated OK with patient/caregiver Yes     Reason For Admission Hydronephrosis of left kidney     People in Home spouse     Do you expect to return to your current living situation? Yes     Do you have help at home or someone to help you manage your care at home? Yes     Prior to hospitilization cognitive status: Alert/Oriented     Current cognitive status: Alert/Oriented     Walking or Climbing Stairs Difficulty no     Dressing/Bathing Difficulty no     Equipment  Currently Used at Home walker, rolling;wheelchair     Readmission within 30 days? No     Patient currently being followed by outpatient case management? No     Do you currently have service(s) that help you manage your care at home? No     Do you take prescription medications? Yes     Do you have prescription coverage? Yes     Coverage Medicaid     Do you have any problems affording any of your prescribed medications? No     Is the patient taking medications as prescribed? yes     How do you get to doctors appointments? car, drives self;family or friend will provide     Are you on dialysis? No     Do you take coumadin? No     Discharge Plan A Home     Discharge Plan B Home with family     DME Needed Upon Discharge  none     Discharge Plan discussed with: Patient     Transition of Care Barriers None        Physical Activity    On average, how many days per week do you engage in moderate to strenuous exercise (like a brisk walk)? 4 days     On average, how many minutes do you engage in exercise at this level? 20 min        Financial Resource Strain    How hard is it for you to pay for the very basics like food, housing, medical care, and heating? Somewhat hard        Housing Stability    In the last 12 months, was there a time when you were not able to pay the mortgage or rent on time? No     In the last 12 months, how many places have you lived? 1     In the last 12 months, was there a time when you did not have a steady place to sleep or slept in a shelter (including now)? No        Transportation Needs    In the past 12 months, has lack of transportation kept you from medical appointments or from getting medications? No     In the past 12 months, has lack of transportation kept you from meetings, work, or from getting things needed for daily living? No        Food Insecurity    Within the past 12 months, you worried that your food would run out before you got the money to buy more. Never true     Within the past 12  months, the food you bought just didn't last and you didn't have money to get more. Never true        Stress    Do you feel stress - tense, restless, nervous, or anxious, or unable to sleep at night because your mind is troubled all the time - these days? To some extent        Social Connections    In a typical week, how many times do you talk on the phone with family, friends, or neighbors? Three times a week     How often do you get together with friends or relatives? Once a week     How often do you attend Zoroastrianism or Islam services? 1 to 4 times per year     Do you belong to any clubs or organizations such as Zoroastrianism groups, unions, fraternal or athletic groups, or school groups? No     How often do you attend meetings of the clubs or organizations you belong to? Never     Are you , , , , never , or living with a partner?         Alcohol Use    Q1: How often do you have a drink containing alcohol? Never     Q2: How many drinks containing alcohol do you have on a typical day when you are drinking? Patient does not drink     Q3: How often do you have six or more drinks on one occasion? Never                   Spoke to patient. Patient lives at home with her . Post hospital  stay, patient's  will be patient support person and pt. Has transportation at d/c with her . There have been no hospitalizations within the last 30 days per pt and pt wife. Verified pt PCP and preferred pharmacy. Pt stated not on Coumadin and is not receiving dialysis. All questions answered regarding case management/ discharge planning , pt verbalized understanding. Discharge booklet with SW contact information given to pt.     Discharge Plan A and Plan B have been determined by review of patient's clinical status, future medical and therapeutic needs, and coverage/benefits for post-acute care in coordination with multidisciplinary team members.      PENNY Jamison  [facility]  CM

## 2024-02-07 NOTE — PROGRESS NOTES
[facility]  Urology  Progress Note    Patient Name: Edita Riley  MRN: [id number]  Admission Date: 2/6/2024  Hospital Length of Stay: 1 days  Code Status: Full Code   Attending Provider: Leslie Balbuena MD   Primary Care Physician: Trina Vu MD    Subjective:       Interval History: NAOE, AFVSS, reports feeling okay but some pain today, denies n/v but has not passed flatus yet, has not ambulated yet     LNT  /200   Urostomy  /215  UOP /585  ORESTES /50 SS  NGT  /5    Objective:     Temp:  [97.5 °F (36.4 °C)-98.6 °F (37 °C)] 98.2 °F (36.8 °C)  Pulse:  [71-88] 71  Resp:  [10-20] 20  SpO2:  [96 %-100 %] 100 %  BP: (110-149)/(63-78) 110/63     Body mass index is 26.7 kg/m².    Date 02/07/24 0700 - 02/08/24 0659   Shift 4151-6428 1055-7463 2572-5429 24 Hour Total   INTAKE   Shift Total(mL/kg)       OUTPUT   Urine(mL/kg/hr) 125   125   Shift Total(mL/kg) 125(1.5)   125(1.5)   Weight (kg) 82 82 82 82          Drains       Drain  Duration                  Suprapubic Catheter 100% silicone 16 Fr. -- days         Nephrostomy 02/02/24 1255 Left 12 Fr. 4 days         Closed/Suction Drain 02/06/24 Tube - 1 Midline Abdomen Bulb 15 Fr. 1 day         NG/OG Tube 02/06/24 0748 nasogastric <1 day         Urostomy 02/06/24 1815 ureterostomy, left <1 day                     Physical Exam  Constitutional:       General: She is not in acute distress.     Appearance: Normal appearance.   HENT:      Head: Normocephalic and atraumatic.      Nose: Nose normal.      Comments: NGT in place to LIWS, minimal green output  Eyes:      Conjunctiva/sclera: Conjunctivae normal.   Cardiovascular:      Rate and Rhythm: Normal rate.   Pulmonary:      Effort: Pulmonary effort is normal. No respiratory distress.   Abdominal:      General: There is no distension.      Palpations: Abdomen is soft.      Comments: Incision CDI  Abdomen appropriately ttp   Urostomy in place, stent appears appropriately placed, urostomy with CYU output,  no surrounding erythema/signs of dehiscence/signs of infection  Prior ileostomy site with packing in place, no significant discharge or purulence  L NT in place with cyu  ORESTES in place w/ss output    Musculoskeletal:         General: No deformity.   Skin:     General: Skin is warm and dry.   Neurological:      General: No focal deficit present.      Mental Status: She is alert.   Psychiatric:         Mood and Affect: Mood normal.         Behavior: Behavior normal.           Significant Labs:    BMP:  Recent Labs   Lab 02/06/24  0551 02/06/24  1836 02/07/24  0242    141 141   K 3.8 4.1 4.1   * 118* 117*   CO2 15* 12* 15*   BUN 13 11 13   CREATININE 1.4 1.4 1.4   CALCIUM 9.8 7.4* 8.3*       CBC:   Recent Labs   Lab 02/06/24  1836 02/07/24  0242   WBC 16.20* 14.87*   HGB 13.0 12.5   HCT 42.0 40.1    297       All pertinent labs results from the past 24 hours have been reviewed.    Significant Imaging:  All pertinent imaging results/findings from the past 24 hours have been reviewed.                  Assessment/Plan:     Ileostomy in place  Edita Riley is a 59 y.o. female with a history of cervical cancer s/p XRT complicated by end-stage bladder and distal ureteral strictures. She underwent urinary diversion with a transverse colon conduit in 2020 complicated by a bowel perforation resulting in an ileostomy creation for GI diversion. She is now POD s/p surgical repair of her left ureterocolic anastomotic stricture and ileostomy reversal.         - Pain - mmpc  - Diet - npo  - mIVF  - will maintain drains   - NGT w/minimal output to LIWS, will cont. Appreciate crs recs.  - Nausea control w/ PRN antiemetics   - OOB, aim to ambulate today   - PT/OT  - Encourage IS  - DVT ppx: sqh   - daily labs  - restarting home meds as appropriate     - Dispo: continue inpatient care           VTE Risk Mitigation (From admission, onward)           Ordered     heparin (porcine) injection 5,000 Units  Every 8  hours         02/06/24 1725     Place ANANT hose  Until discontinued         02/06/24 0526     Place sequential compression device  Until discontinued         02/06/24 0526                    Marlo Gabriel MD  Urology  [facility]

## 2024-02-07 NOTE — CONSULTS
[facility]  Infectious Disease  Consult Note    Patient Name: Edita Riley  MRN: [id number]  Admission Date: 2/6/2024  Hospital Length of Stay: 1 days  Attending Physician: Leslie Balbuena MD  Primary Care Provider: Trina Vu MD     Isolation Status: No active isolations    Patient information was obtained from patient and past medical records.      Inpatient consult to Infectious Diseases  Consult performed by: Megha Zhang PA-C  Consult ordered by: Nolan Hartman MD        Assessment/Plan:     Renal/  UTI (urinary tract infection)    58 year old female with history of cervical CA s/p chemotherapy and XRT c/b bilateral ureteral strictures requiring bilateral nephrostomy tubes (now only with left tube; right removed 4/2023), s/p urinary to colon conduit 2020 complicated by an anastomotic leak from colo-colonic anastomosis at harvest site for the conduit, necessitating right colectomy/end ileostomy, recurrent UTIs who has been admitted for planned surgery. On 2/6 she underwent surgical repair of her left ureterocolic anastomotic stricture and ileostomy reversal with urology and CRS. Afebrile post op. WBC 14K.  Urine culture is positive for Bacillus. ID consulted for recommendations.    Recommendations  Start IV Ertapenem for perioperative antibiotics. Would continue while she remains in the hospital.  Continue plan per urology and CRS  Discussed plan with ID staff. ID will follow.        Thank you for the consult. Please secure chat for any questions.  Megha Zhang PA-C      Subjective:     Principal Problem: Hydronephrosis of left kidney    HPI: Edita Riley is a 58 year old female with history of cervical CA s/p chemotherapy and XRT c/b bilateral ureteral strictures requiring bilateral nephrostomy tubes (now only with left tube; right removed 4/2023), s/p urinary to colon conduit 2020 complicated by an anastomotic leak from colo-colonic anastomosis at harvest  site for the conduit, necessitating right colectomy/end ileostomy, recurrent UTIs who has been admitted for planned surgery. She was scheduled for revision of her colonic conduit-ureteral anastomosis in November with a planned attempt at ileostomy takedown and ileocolic anastomosis at the same time, unfortunately that was delayed due to recurrent hospital admissions.     On 2/6, she underwent ileostomy takedown, ileosigmoid anastomosis,  lysis of adhesion, revision of left ureterocolic anastomosis/ left ureteral reimplant into transverse colon conduit  with urology and CRS. Afebrile post op. WBC 14K.  Urine culture is positive for Bacillus. ID consulted for recommendations.      Past Medical History:   Diagnosis Date    Abnormal mammogram 08/25/2020    Abnormal mammogram 08/25/2020    Acute blood loss anemia     Acute deep vein thrombosis (DVT) of lower extremity 12/09/2020    Advance care planning 04/30/2021    ODESSA (acute kidney injury) 03/21/2020    Anemia due to chronic blood loss     Anemia due to chronic kidney disease     Anemia due to chronic kidney disease 05/18/2020    Anxiety     Bilateral ureteral obstruction 09/11/2020    Bilious vomiting with nausea 06/11/2023    Cardiovascular event risk -- low 09/14/2015    ASCVD 10-year risk 1.9% (with optimal risk factors 1.3%) as of 9/14/2015     Cervical cancer 2014    Chronic back pain     Colostomy care     Deep vein thrombosis     Depression     Diarrhea due to malabsorption 11/14/2018    Difficult intubation     Discharge planning issues 04/30/2021    Disorder of kidney and ureter     DVT of lower extremity, bilateral 11/04/2020    Edema 04/10/2023    Emphysema of lung 04/10/2023    Fibromyalgia     Fungemia 09/27/2020    Generalized abdominal pain 08/25/2020    GERD (gastroesophageal reflux disease)     Hemifacial spasm 09/16/2015    Hiatal hernia 2014    History of cervical cancer 10/11/2018    History of essential hypertension 05/04/2015    Not requiring  medications at this time  BP is low- concern that this may correlate with increased stool output from stoma. Encourage her to contact GI and her PCP.    Hx of psychiatric care     Cymbalta, trazodone    Hypertension     Hypomagnesemia 11/21/2018    Hyponatremia 09/10/2023    Impaired functional mobility, balance, gait, and endurance 07/24/2015    Lactose intolerance     Major depressive disorder, recurrent episode, moderate 06/02/2023    Metastatic squamous cell carcinoma to lymph node 10/11/2018    Moderate episode of recurrent major depressive disorder 04/21/2021    Nephrostomy complication 10/26/2023    Neuropathy due to chemotherapeutic drug 11/14/2018    Osteoarthritis of back     Peritonitis 09/22/2020    Physical deconditioning 04/30/2021    Pseudomonas urinary tract infection 04/21/2021    Psychiatric problem     Pyelitis 09/09/2023    QT prolongation 04/16/2021    Refusal of blood transfusions as patient is [employer]     Schatzki's ring 09/14/2015    Seen on outside EGD 05/2014, underwent esophageal dilatation. Bx were negative.     Seizure-like activity 12/02/2018    Seizures     Sleep stage dysfunction     Noted on PSG 06/2017; negative for obstructive sleep apnea     Stroke     Urinary tract infection associated with nephrostomy catheter 06/11/2020    Wound infection after surgery 09/24/2020       Past Surgical History:   Procedure Laterality Date    ANASTOMOSIS OF INTESTINE N/A 2/6/2024    Procedure: ANASTOMOSIS, INTESTINE - Ileocolic anastomosis;  Surgeon: Hammad Reynolds MD;  Location: [facility] OR [address] [place] [address];  Service: Colon and Rectal;  Laterality: N/A;    ANTEGRADE NEPHROSTOGRAPHY Bilateral 9/28/2020    Procedure: Nephrostogram - antegrade;  Surgeon: Leslie Balbuena MD;  Location: [facility] OR [address] [place] [address];  Service: Urology;  Laterality: Bilateral;    ANTEGRADE NEPHROSTOGRAPHY Left 4/20/2021    Procedure: NEPHROSTOGRAM, ANTEGRADE;  Surgeon: Leslie Balbuena MD;  Location: [facility] OR [address] [place] [address];  Service:  Urology;  Laterality: Left;    ANTEGRADE NEPHROSTOGRAPHY Right 10/27/2022    Procedure: NEPHROSTOGRAM, ANTEGRADE;  Surgeon: Chirag Russ MD;  Location: [facility] OR 1ST FLR;  Service: Urology;  Laterality: Right;    ANTEGRADE NEPHROSTOGRAPHY Right 4/21/2023    Procedure: NEPHROSTOGRAM, ANTEGRADE;  Surgeon: Chirag Russ MD;  Location: [facility] OR 2ND FLR;  Service: Urology;  Laterality: Right;    ANTEGRADE NEPHROSTOGRAPHY Left 6/6/2023    Procedure: Nephrostogram - antegrade;  Surgeon: Leslie Balbuena MD;  Location: [facility] OR 1ST FLR;  Service: Urology;  Laterality: Left;    BILATERAL OOPHORECTOMY  2015    CHOLECYSTECTOMY  11/09/2016    Done at [facility], path showed chronic cholecystitis and gallstones    cold knife conization  2014    COLECTOMY, RIGHT  9/17/2020    Procedure: COLECTOMY, RIGHT Extended;  Surgeon: Hammad Reynolds MD;  Location: [facility] OR 2ND FLR;  Service: Colon and Rectal;;    COLONOSCOPY  2014    COLONOSCOPY N/A 10/24/2016    at [facility], Dr Gutiérrez    COLONOSCOPY N/A 5/18/2018    Procedure: COLONOSCOPY;  Surgeon: Arden Gutiérrez MD;  Location: [facility] ENDO (4TH FLR);  Service: Endoscopy;  Laterality: N/A;    COLONOSCOPY N/A 7/28/2020    Procedure: COLONOSCOPY;  Surgeon: Hammad Reynolds MD;  Location: [facility] ENDO (4TH FLR);  Service: Colon and Rectal;  Laterality: N/A;  covid test [place] 7/25    CYSTOSCOPY WITH URETEROSCOPY, RETROGRADE PYELOGRAPHY, AND INSERTION OF STENT Bilateral 3/21/2020    Procedure: CYSTOSCOPY, WITH RETROGRADE PYELOGRAM,;  Surgeon: Leslie Balbuena MD;  Location: [facility] OR 1ST FLR;  Service: Urology;  Laterality: Bilateral;    DILATION OF NEPHROSTOMY TRACT Right 10/27/2022    Procedure: DILATION, NEPHROSTOMY TRACT;  Surgeon: Chirag Russ MD;  Location: [facility] OR 1ST FLR;  Service: Urology;  Laterality: Right;    ESOPHAGOGASTRODUODENOSCOPY  2014    ESOPHAGOGASTRODUODENOSCOPY  11/18/2020    ESOPHAGOGASTRODUODENOSCOPY N/A 11/18/2020    Procedure: ESOPHAGOGASTRODUODENOSCOPY (EGD);   Surgeon: Zenon Spencer MD;  Location: [facility] ENDO;  Service: Endoscopy;  Laterality: N/A;    ESOPHAGOGASTRODUODENOSCOPY N/A 12/11/2020    Procedure: EGD (ESOPHAGOGASTRODUODENOSCOPY);  Surgeon: Juancho Muse MD;  Location: [facility] ENDO (2ND FLR);  Service: Endoscopy;  Laterality: N/A;    EXCISION, SMALL INTESTINE  2/6/2024    Procedure: EXCISION, SMALL INTESTINE;  Surgeon: Hammad Reynolds MD;  Location: [facility] OR 2ND FLR;  Service: Colon and Rectal;;    HYSTERECTOMY  2014    [facility] for cervical cancer    ILEOSTOMY  9/17/2020    Procedure: CREATION, ILEOSTOMY;  Surgeon: Hammad Reynolds MD;  Location: [facility] OR 2ND FLR;  Service: Colon and Rectal;;    ILEOSTOMY CLOSURE N/A 2/6/2024    Procedure: CLOSURE, ILEOSTOMY;  Surgeon: Hammad Reynolds MD;  Location: [facility] OR 2ND FLR;  Service: Colon and Rectal;  Laterality: N/A;    LAPAROTOMY, EXPLORATORY N/A 2/6/2024    Procedure: OPEN LAPAROTOMY, EXPLORATORY;  Surgeon: Leslie Balbuena MD;  Location: [facility] OR 2ND FLR;  Service: Urology;  Laterality: N/A;  22 modifier    LOOPOGRAM N/A 4/20/2021    Procedure: LOOPOGRAM;  Surgeon: Leslie Balbuena MD;  Location: [facility] OR 1ST FLR;  Service: Urology;  Laterality: N/A;    LOOPOGRAM N/A 6/6/2023    Procedure: LOOPOGRAM;  Surgeon: Leslie Balbuena MD;  Location: [facility] OR 1ST FLR;  Service: Urology;  Laterality: N/A;    LYSIS OF ADHESIONS  2/6/2024    Procedure: LYSIS, ADHESIONS;  Surgeon: Leslie Balbuena MD;  Location: [facility] OR 2ND FLR;  Service: Urology;;    LYSIS OF ADHESIONS  2/6/2024    Procedure: LYSIS, ADHESIONS;  Surgeon: Hammad Reynolds MD;  Location: [facility] OR 2ND FLR;  Service: Colon and Rectal;;    MOBILIZATION OF SPLENIC FLEXURE N/A 9/11/2020    Procedure: MOBILIZATION, COLONIC;  Surgeon: Hammad Reynolds MD;  Location: [facility] OR 2ND FLR;  Service: Colon and Rectal;  Laterality: N/A;    NEPHROSTOGRAPHY Bilateral 4/17/2021    Procedure: Nephrostogram;  Surgeon: Celeste Surgeon;  Location: [facility];  Service: Anesthesiology;   Laterality: Bilateral;    OOPHORECTOMY      PERCUTANEOUS NEPHROLITHOTOMY Right 4/21/2023    Procedure: NEPHROLITHOTOMY, PERCUTANEOUS;  Surgeon: Chirag Russ MD;  Location: [facility] OR [address] [place] [address];  Service: Urology;  Laterality: Right;  2.5 hrs    PERCUTANEOUS NEPHROSTOMY  4/21/2023    Procedure: CREATION, NEPHROSTOMY, PERCUTANEOUS with removal of existing nephrostomy tube;  Surgeon: Chirag Russ MD;  Location: [facility] OR [address] [place] [address];  Service: Urology;;    PYELOSCOPY Right 10/27/2022    Procedure: PYELOSCOPY;  Surgeon: Chirag Russ MD;  Location: [facility] OR [address] [place] [address];  Service: Urology;  Laterality: Right;    REPLACEMENT OF NEPHROSTOMY TUBE Right 10/27/2022    Procedure: REPLACEMENT, NEPHROSTOMY TUBE;  Surgeon: Chirag Russ MD;  Location: [facility] OR [address] [place] [address];  Service: Urology;  Laterality: Right;    RETROPERITONEAL LYMPHADENECTOMY Right 9/17/2018    Procedure: LYMPHADENECTOMY, RETROPERITONEUM;  Surgeon: Sebas Reed MD;  Location: [facility] OR [address] [place] [address];  Service: General;  Laterality: Right;  ILIAC    REVISION OF ANASTOMOSIS OF URETER TO ILEAL CONDUIT N/A 2/6/2024    Procedure: REVISION, ANASTOMOSIS, URETEROILEAL;  Surgeon: Leslie Balbuena MD;  Location: [facility] OR [address] [place] [address];  Service: Urology;  Laterality: N/A;    URETEROSCOPIC REMOVAL OF URETERIC CALCULUS Right 10/27/2022    Procedure: REMOVAL, CALCULUS, URETER, URETEROSCOPIC;  Surgeon: Chirag Russ MD;  Location: [facility] OR [address] [place] [address];  Service: Urology;  Laterality: Right;    URETEROSCOPY Right 10/27/2022    Procedure: URETEROSCOPY-ANTEGRADE;  Surgeon: Chirag Russ MD;  Location: [facility] OR [address] [place] [address];  Service: Urology;  Laterality: Right;       Review of patient's allergies indicates:   Allergen Reactions    Bee sting [allergen ext-venom-honey bee]      Rash      Grass pollen-bermuda, standard      rash       Medications:  Medications Prior to Admission   Medication Sig    ferrous sulfate (FEOSOL) 325 mg (65 mg iron) Tab tablet Take 1 tablet (325 mg total) by  mouth 2 (two) times daily.    loratadine (CLARITIN) 10 mg tablet Take 10 mg by mouth daily as needed for Allergies.    mirtazapine (REMERON) 30 MG tablet Take 1 tablet (30 mg total) by mouth nightly.    ondansetron (ZOFRAN-ODT) 4 MG TbDL Dissolve 1 tablet (4 mg total) by mouth every 8 (eight) hours as needed (nausea).    sodium bicarbonate 650 MG tablet Take 2 tablets (1,300 mg total) by mouth 2 (two) times daily.     Antibiotics (From admission, onward)      Start     Stop Route Frequency Ordered    24 2100  mupirocin 2 % ointment         24 0859 Nasl 2 times daily 24 1725          Antifungals (From admission, onward)      None          Antivirals (From admission, onward)      None             Immunization History   Administered Date(s) Administered    Influenza - Quadrivalent - PF *Preferred* (6 months and older) 2017, 2019    Tdap 2017       Family History       Problem Relation (Age of Onset)    Breast cancer Maternal Aunt    Cancer Father, Mother    Cervical cancer Mother    Colon cancer Father    Drug abuse Daughter, Daughter    Esophageal cancer Father    Heart disease Sister, Maternal Grandmother    Suicide Daughter          Social History     Socioeconomic History    Marital status:      Spouse name: Hammad    Number of children: 2   Tobacco Use    Smoking status: Never    Smokeless tobacco: Never   Substance and Sexual Activity    Alcohol use: No     Alcohol/week: 0.0 standard drinks of alcohol    Drug use: No    Sexual activity: Yes     Partners: Male     Birth control/protection: None     Comment:  19 years since    Social History Narrative    , twin daughters (1  2018), disabled due to childhood stroke, Uatsdin sophia     Social Determinants of Health     Financial Resource Strain: Medium Risk (2024)    Overall Financial Resource Strain (CARDIA)     Difficulty of Paying Living Expenses: Somewhat hard   Food  Insecurity: No Food Insecurity (2/7/2024)    Hunger Vital Sign     Worried About Running Out of Food in the Last Year: Never true     Ran Out of Food in the Last Year: Never true   Transportation Needs: No Transportation Needs (2/7/2024)    PRAPARE - Transportation     Lack of Transportation (Medical): No     Lack of Transportation (Non-Medical): No   Physical Activity: Insufficiently Active (2/7/2024)    Exercise Vital Sign     Days of Exercise per Week: 4 days     Minutes of Exercise per Session: 20 min   Stress: Stress Concern Present (2/7/2024)    [facility] - Occupational Stress Questionnaire     Feeling of Stress : To some extent   Social Connections: Moderately Integrated (2/7/2024)    Social Connection and Isolation Panel [NHANES]     Frequency of Communication with Friends and Family: Three times a week     Frequency of Social Gatherings with Friends and Family: Once a week     Attends Taoism Services: 1 to 4 times per year     Active Member of Clubs or Organizations: No     Attends Club or Organization Meetings: Never     Marital Status:    Housing Stability: Low Risk  (2/7/2024)    Housing Stability Vital Sign     Unable to Pay for Housing in the Last Year: No     Number of Places Lived in the Last Year: 1     Unstable Housing in the Last Year: No     Review of Systems   Constitutional:  Positive for appetite change and fatigue. Negative for chills, diaphoresis and fever.   Respiratory:  Negative for cough, chest tightness and shortness of breath.    Cardiovascular:  Negative for chest pain and leg swelling.   Gastrointestinal:  Positive for abdominal pain. Negative for constipation and nausea.   Musculoskeletal:  Negative for arthralgias, back pain and joint swelling.   Skin:  Positive for wound. Negative for color change and rash.   Neurological:  Negative for headaches.   Psychiatric/Behavioral:  Negative for agitation and confusion. The patient is not  nervous/anxious.      Objective:     Vital Signs (Most Recent):  Temp: 99.4 °F (37.4 °C) (02/07/24 1147)  Pulse: 77 (02/07/24 1147)  Resp: 12 (02/07/24 1147)  BP: 120/62 (02/07/24 1147)  SpO2: 96 % (02/07/24 1147) Vital Signs (24h Range):  Temp:  [97.4 °F (36.3 °C)-99.4 °F (37.4 °C)] 99.4 °F (37.4 °C)  Pulse:  [70-88] 77  Resp:  [10-20] 12  SpO2:  [96 %-100 %] 96 %  BP: (110-149)/(62-78) 120/62     Weight: 82 kg (180 lb 12.4 oz)  Body mass index is 26.7 kg/m².    Estimated Creatinine Clearance: 48.3 mL/min (based on SCr of 1.4 mg/dL).     Physical Exam  Vitals and nursing note reviewed.   Constitutional:       General: She is not in acute distress.     Appearance: Normal appearance. She is well-developed. She is not diaphoretic.   HENT:      Head: Normocephalic and atraumatic.      Right Ear: External ear normal.      Left Ear: External ear normal.      Nose: Nose normal.      Comments: NG tube  Eyes:      General: No scleral icterus.        Right eye: No discharge.         Left eye: No discharge.      Extraocular Movements: Extraocular movements intact.      Conjunctiva/sclera: Conjunctivae normal.   Cardiovascular:      Rate and Rhythm: Normal rate and regular rhythm.   Pulmonary:      Effort: Pulmonary effort is normal. No respiratory distress.      Breath sounds: No stridor.   Abdominal:      Tenderness: There is abdominal tenderness.      Comments: Incision CDI; Urostomy  Prior ileostomy site packed; ORESTES drain with SS output      Genitourinary:     Comments: Left nephrostomy tube  Musculoskeletal:         General: Normal range of motion.   Skin:     Findings: No erythema or rash.   Neurological:      General: No focal deficit present.      Mental Status: She is alert and oriented to person, place, and time. Mental status is at baseline.      Cranial Nerves: No cranial nerve deficit.   Psychiatric:         Mood and Affect: Mood normal.         Behavior: Behavior normal.         Thought Content: Thought content  normal.         Judgment: Judgment normal.          Significant Labs: CBC:   Recent Labs   Lab 02/06/24  1836 02/07/24  0242   WBC 16.20* 14.87*   HGB 13.0 12.5   HCT 42.0 40.1    297     CMP:   Recent Labs   Lab 02/06/24  0551 02/06/24  1836 02/07/24 0242    141 141   K 3.8 4.1 4.1   * 118* 117*   CO2 15* 12* 15*   GLU 88 144* 129*   BUN 13 11 13   CREATININE 1.4 1.4 1.4   CALCIUM 9.8 7.4* 8.3*   PROT  --  5.6*  --    ALBUMIN  --  2.3*  --    BILITOT  --  0.4  --    ALKPHOS  --  93  --    AST  --  28  --    ALT  --  22  --    ANIONGAP 8 11 9     Microbiology Results (last 7 days)       Procedure Component Value Units Date/Time    Urine culture [[id number]]  (Abnormal) Collected: 02/06/24 0700    Order Status: Completed Specimen: Urine from Kidney, Left Updated: 02/07/24 0837     Urine Culture, Routine BACILLUS SPECIES  50,000 - 99,999 cfu/ml      Narrative:      Indicated criteria for high risk culture:->Prior to urologic  procedures          Recent Lab Results         02/07/24 0242 02/06/24 1836        Albumin   2.3       ALP   93       ALT   22       Anion Gap 9   11       AST   28       Baso # 0.01   0.02       Basophil % 0.1   0.1       BILIRUBIN TOTAL   0.4  Comment: For infants and newborns, interpretation of results should be based  on gestational age, weight and in agreement with clinical  observations.    Premature Infant recommended reference ranges:  Up to 24 hours.............<8.0 mg/dL  Up to 48 hours............<12.0 mg/dL  3-5 days..................<15.0 mg/dL  6-29 days.................<15.0 mg/dL         BUN 13   11       Calcium 8.3   7.4       Chloride 117   118       CO2 15   12       Creatinine 1.4   1.4       Differential Method Automated   Automated       eGFR 42.8   42.8       Eos # 0.0   0.0       Eos % 0.0   0.0       Glucose 129   144       Gran # (ANC) 12.6   14.4       Gran % 84.7   88.7       Hematocrit 40.1   42.0       Hemoglobin 12.5   13.0       Immature  Grans (Abs) 0.05  Comment: Mild elevation in immature granulocytes is non specific and   can be seen in a variety of conditions including stress response,   acute inflammation, trauma and pregnancy. Correlation with other   laboratory and clinical findings is essential.     0.06  Comment: Mild elevation in immature granulocytes is non specific and   can be seen in a variety of conditions including stress response,   acute inflammation, trauma and pregnancy. Correlation with other   laboratory and clinical findings is essential.         Immature Granulocytes 0.3   0.4       Lymph # 1.0   0.7       Lymph % 6.9   4.0       Magnesium  2.0   2.0       MCH 28.6   28.4       MCHC 31.2   31.0       MCV 92   92       Mono # 1.2   1.1       Mono % 8.0   6.8       MPV 9.5   9.3       nRBC 0   0       Phosphorus Level 4.0   3.6       Platelet Count 297   303       Potassium 4.1   4.1       PROTEIN TOTAL   5.6       RBC 4.37   4.57       RDW 15.4   15.5       Sodium 141   141       WBC 14.87   16.20               Significant Imaging:       XR Gastric tube check, non-radiologist performed [[id number]] Resulted: 02/07/24 1150   Order Status: Completed Updated: 02/07/24 1153   Narrative:     EXAMINATION:  XR GASTRIC TUBE CHECK, NON-RADIOLOGIST PERFORMED    CLINICAL HISTORY:  NG tube confirmation;    TECHNIQUE:  Abdomen one view    COMPARISON:  N 02/06/2024 one    FINDINGS:  NG tube in the stomach similar to the previous study.  No significant bowel dilatation.  Postsurgical changes as before   Impression:       See above      Electronically signed by: Gokul Hardy MD  Date: 02/07/2024  Time: 11:50   X-Ray Abdomen AP 1 View [[id number]] Resulted: 02/07/24 0857   Order Status: Completed Updated: 02/07/24 0859   Narrative:     EXAMINATION:  XR ABDOMEN AP 1 VIEW    CLINICAL HISTORY:  confirm left ureteral stent placement;    TECHNIQUE:  Single AP View of the abdomen was performed.    COMPARISON:  11/04/2023    FINDINGS:  NG/OG tube  terminates in the left upper quadrant.  Surgical clips and skin staples noted.  Several drainage catheters are noted over the left abdomen, including left nephrostomy.  Bowel gas pattern is nonobstructive.  No evidence for gross pneumoperitoneum.  Regional osseous structures are unremarkable.   Impression:       As above.      Electronically signed by: Ed Marcial MD  Date: 02/07/2024  Time: 08:57

## 2024-02-07 NOTE — PT/OT/SLP EVAL
Physical Therapy Co-Evaluation    Patient Name:  Edita Riley   MRN:  [id number]    Co-evaluation and co-treatment performed for this visit due to suspected patient need for two skilled therapists to ensure patient and staff safety and to accommodate for patient activity tolerance/pain management     Recommendations:     Discharge Recommendations: Low Intensity Therapy   Discharge Equipment Recommendations: none   Barriers to discharge: None    Assessment:     Edita Riley is a 59 y.o. female admitted with a medical diagnosis of Hydronephrosis of left kidney.  She presents with the following impairments/functional limitations: weakness, impaired endurance, impaired self care skills, impaired functional mobility, gait instability, impaired balance, pain, decreased safety awareness Pt. cooperative and tolerated treatment well. Pt. progressing with mobility. At end of session pt. was left up in chair and NG tube reconnected to wall suction. NG tube appeared to be draining well, but pt. appeared to become nauseated and pulled out the NG tube on her own. Nursing notified.    Rehab Prognosis: Good; patient would benefit from acute skilled PT services to address these deficits and reach maximum level of function.    Recent Surgery: Procedure(s) (LRB):  REVISION, ANASTOMOSIS, URETEROILEAL (N/A)  OPEN LAPAROTOMY, EXPLORATORY (N/A)  CLOSURE, ILEOSTOMY (N/A)  ANASTOMOSIS, INTESTINE - Ileocolic anastomosis (N/A)  EXCISION, SMALL INTESTINE  LYSIS, ADHESIONS  LYSIS, ADHESIONS 1 Day Post-Op    Plan:     During this hospitalization, patient to be seen 4 x/week to address the identified rehab impairments via gait training, therapeutic activities, therapeutic exercises and progress toward the following goals:    Plan of Care Expires:  03/08/24    Subjective     Chief Complaint: nausea and abd. pain  Patient/Family Comments/goals: Pt. Agreeable to PT  Pain/Comfort:  Pain Rating 1: 10/10  Location 1:  abdomen  Pain Addressed 1: Pre-medicate for activity, Reposition, Distraction  Pain Rating Post-Intervention 1:  (pt. did not rate)    Patients cultural, spiritual, Voodoo conflicts given the current situation: no    Living Environment:  Pt. Lives with spouse in ground-level apartment with 4 MANAV and (L) handrail.  Prior to admission, patients level of function was amb. with RW.  Equipment used at home: walker, rolling, wheelchair.  Upon discharge, patient will have assistance from spouse.    Objective:     Communicated with nursing prior to session.  Patient found supine with NG tube, colostomy, peripheral IV, ORESTES drain  upon PT entry to room.    General Precautions: Standard, fall  Orthopedic Precautions:N/A   Braces: N/A  Respiratory Status: Room air    Exams:  RLE ROM: WFL  RLE Strength: WFL  LLE ROM: WFL  LLE Strength: WFL    Functional Mobility:  Bed Mobility:     Rolling Right: minimum assistance  Scooting: minimum assistance  Supine to Sit: minimum assistance  Transfers:     Sit to Stand:  minimum assistance and moderate assistance with rolling walker  Bed to Chair: minimum assistance with  rolling walker  using  Stand Pivot  Gait: 10' and 160' with RW and CGA with decreased step length/karma without LOB.  Balance: fair+      AM-PAC 6 CLICK MOBILITY  Total Score:17       Treatment & Education:  Discussed therapy needs, goals, and POC. Pt. Performed ADLs at sink with RW and OT present    Patient left up in chair with all lines intact and call button in reach.    GOALS:   Multidisciplinary Problems       Physical Therapy Goals          Problem: Physical Therapy    Goal Priority Disciplines Outcome Goal Variances Interventions   Physical Therapy Goal     PT, PT/OT Ongoing, Progressing     Description: Goals to be met by: 2024     Patient will increase functional independence with mobility by performin. Supine to sit with Set-up [place]  2. Sit to supine with Set-up [place]  3. Sit to  stand transfer with Supervision  4. Bed to chair transfer with Supervision using Rolling Walker  5. Gait  x 250 feet with Supervision using Rolling Walker.   6. Ascend/descend 4 stair with left Handrails Stand-by Assistance using No Assistive Device.   7. Lower extremity exercise program x15 reps per handout, with supervision                         History:     Past Medical History:   Diagnosis Date    Abnormal mammogram 08/25/2020    Abnormal mammogram 08/25/2020    Acute blood loss anemia     Acute deep vein thrombosis (DVT) of lower extremity 12/09/2020    Advance care planning 04/30/2021    ODESSA (acute kidney injury) 03/21/2020    Anemia due to chronic blood loss     Anemia due to chronic kidney disease     Anemia due to chronic kidney disease 05/18/2020    Anxiety     Bilateral ureteral obstruction 09/11/2020    Bilious vomiting with nausea 06/11/2023    Cardiovascular event risk -- low 09/14/2015    ASCVD 10-year risk 1.9% (with optimal risk factors 1.3%) as of 9/14/2015     Cervical cancer 2014    Chronic back pain     Colostomy care     Deep vein thrombosis     Depression     Diarrhea due to malabsorption 11/14/2018    Difficult intubation     Discharge planning issues 04/30/2021    Disorder of kidney and ureter     DVT of lower extremity, bilateral 11/04/2020    Edema 04/10/2023    Emphysema of lung 04/10/2023    Fibromyalgia     Fungemia 09/27/2020    Generalized abdominal pain 08/25/2020    GERD (gastroesophageal reflux disease)     Hemifacial spasm 09/16/2015    Hiatal hernia 2014    History of cervical cancer 10/11/2018    History of essential hypertension 05/04/2015    Not requiring medications at this time  BP is low- concern that this may correlate with increased stool output from stoma. Encourage her to contact GI and her PCP.    Hx of psychiatric care     Cymbalta, trazodone    Hypertension     Hypomagnesemia 11/21/2018    Hyponatremia 09/10/2023    Impaired functional mobility, balance, gait, and  endurance 07/24/2015    Lactose intolerance     Major depressive disorder, recurrent episode, moderate 06/02/2023    Metastatic squamous cell carcinoma to lymph node 10/11/2018    Moderate episode of recurrent major depressive disorder 04/21/2021    Nephrostomy complication 10/26/2023    Neuropathy due to chemotherapeutic drug 11/14/2018    Osteoarthritis of back     Peritonitis 09/22/2020    Physical deconditioning 04/30/2021    Pseudomonas urinary tract infection 04/21/2021    Psychiatric problem     Pyelitis 09/09/2023    QT prolongation 04/16/2021    Refusal of blood transfusions as patient is Sikhism     Schatzki's ring 09/14/2015    Seen on outside EGD 05/2014, underwent esophageal dilatation. Bx were negative.     Seizure-like activity 12/02/2018    Seizures     Sleep stage dysfunction     Noted on PSG 06/2017; negative for obstructive sleep apnea     Stroke     Urinary tract infection associated with nephrostomy catheter 06/11/2020    Wound infection after surgery 09/24/2020       Past Surgical History:   Procedure Laterality Date    ANASTOMOSIS OF INTESTINE N/A 2/6/2024    Procedure: ANASTOMOSIS, INTESTINE - Ileocolic anastomosis;  Surgeon: Hammad Reynolds MD;  Location: [facility] OR 2ND FLR;  Service: Colon and Rectal;  Laterality: N/A;    ANTEGRADE NEPHROSTOGRAPHY Bilateral 9/28/2020    Procedure: Nephrostogram - antegrade;  Surgeon: Leslie Balbuena MD;  Location: [facility] OR 1ST FLR;  Service: Urology;  Laterality: Bilateral;    ANTEGRADE NEPHROSTOGRAPHY Left 4/20/2021    Procedure: NEPHROSTOGRAM, ANTEGRADE;  Surgeon: Leslie Balbuena MD;  Location: [facility] OR 1ST FLR;  Service: Urology;  Laterality: Left;    ANTEGRADE NEPHROSTOGRAPHY Right 10/27/2022    Procedure: NEPHROSTOGRAM, ANTEGRADE;  Surgeon: Chirag Russ MD;  Location: [facility] OR 1ST FLR;  Service: Urology;  Laterality: Right;    ANTEGRADE NEPHROSTOGRAPHY Right 4/21/2023    Procedure: NEPHROSTOGRAM, ANTEGRADE;  Surgeon: Chirag Russ,  MD;  Location: [facility] OR 2ND FLR;  Service: Urology;  Laterality: Right;    ANTEGRADE NEPHROSTOGRAPHY Left 6/6/2023    Procedure: Nephrostogram - antegrade;  Surgeon: Leslie Balbuena MD;  [facility];  Service: Urology;  Laterality: Left;    BILATERAL OOPHORECTOMY  2015    CHOLECYSTECTOMY  11/09/2016    Done at [facility], path showed chronic cholecystitis and gallstones    cold knife conization  2014    COLECTOMY, RIGHT  9/17/2020    Procedure: COLECTOMY, RIGHT Extended;  Surgeon: Hammad Reynolds MD;  Location: [facility] OR 2ND FLR;  Service: Colon and Rectal;;    COLONOSCOPY  2014    COLONOSCOPY N/A 10/24/2016    at [facility], Dr Gutiérrez    COLONOSCOPY N/A 5/18/2018    Procedure: COLONOSCOPY;  Surgeon: Arden Gutiérrez MD;  Location: [facility] (4TH FLR);  Service: Endoscopy;  Laterality: N/A;    COLONOSCOPY N/A 7/28/2020    Procedure: COLONOSCOPY;  Surgeon: Hammad Reynolds MD;  Location: [facility] (4TH FLR);  Service: Colon and Rectal;  Laterality: N/A;  covid test [place] 7/25    CYSTOSCOPY WITH URETEROSCOPY, RETROGRADE PYELOGRAPHY, AND INSERTION OF STENT Bilateral 3/21/2020    Procedure: CYSTOSCOPY, WITH RETROGRADE PYELOGRAM,;  Surgeon: Leslie Balbuena MD;  [facility];  Service: Urology;  Laterality: Bilateral;    DILATION OF NEPHROSTOMY TRACT Right 10/27/2022    Procedure: DILATION, NEPHROSTOMY TRACT;  Surgeon: Chirag Russ MD;  [address]  Service: Urology;  Laterality: Right;    ESOPHAGOGASTRODUODENOSCOPY  2014    ESOPHAGOGASTRODUODENOSCOPY  11/18/2020    ESOPHAGOGASTRODUODENOSCOPY N/A 11/18/2020    Procedure: ESOPHAGOGASTRODUODENOSCOPY (EGD);  Surgeon: Zenon Spencer MD;  Location: [facility];  Service: Endoscopy;  Laterality: N/A;    ESOPHAGOGASTRODUODENOSCOPY N/A 12/11/2020    Procedure: EGD (ESOPHAGOGASTRODUODENOSCOPY);  Surgeon: Juancho Muse MD;  Location: [facility] (2ND FLR);  Service: Endoscopy;  Laterality: N/A;    EXCISION, SMALL INTESTINE  2/6/2024     Procedure: EXCISION, SMALL INTESTINE;  Surgeon: Hammad Reynolds MD;  Location: [facility] OR 2ND FLR;  Service: Colon and Rectal;;    HYSTERECTOMY  2014    [facility] for cervical cancer    ILEOSTOMY  9/17/2020    Procedure: CREATION, ILEOSTOMY;  Surgeon: Hammad Reynolds MD;  Location: [facility] OR 2ND FLR;  Service: Colon and Rectal;;    ILEOSTOMY CLOSURE N/A 2/6/2024    Procedure: CLOSURE, ILEOSTOMY;  Surgeon: Hammad Reynolds MD;  Location: [facility] OR 2ND FLR;  Service: Colon and Rectal;  Laterality: N/A;    LAPAROTOMY, EXPLORATORY N/A 2/6/2024    Procedure: OPEN LAPAROTOMY, EXPLORATORY;  Surgeon: Leslie Balbuena MD;  Location: [facility] OR 2ND FLR;  Service: Urology;  Laterality: N/A;  22 modifier    LOOPOGRAM N/A 4/20/2021    Procedure: LOOPOGRAM;  Surgeon: Leslie Balbuena MD;  Location: [facility] OR 1ST FLR;  Service: Urology;  Laterality: N/A;    LOOPOGRAM N/A 6/6/2023    Procedure: LOOPOGRAM;  Surgeon: Leslie Balbuena MD;  Location: [facility] OR 1ST FLR;  Service: Urology;  Laterality: N/A;    LYSIS OF ADHESIONS  2/6/2024    Procedure: LYSIS, ADHESIONS;  Surgeon: Leslie Balbuena MD;  Location: [facility] OR 2ND FLR;  Service: Urology;;    LYSIS OF ADHESIONS  2/6/2024    Procedure: LYSIS, ADHESIONS;  Surgeon: Hammad Reynolds MD;  Location: [facility] OR 2ND FLR;  Service: Colon and Rectal;;    MOBILIZATION OF SPLENIC FLEXURE N/A 9/11/2020    Procedure: MOBILIZATION, COLONIC;  Surgeon: Hammad Reynolds MD;  Location: [facility] OR 2ND FLR;  Service: Colon and Rectal;  Laterality: N/A;    NEPHROSTOGRAPHY Bilateral 4/17/2021    Procedure: Nephrostogram;  Surgeon: Celeste Surgeon;  Location: [facility];  Service: Anesthesiology;  Laterality: Bilateral;    OOPHORECTOMY      PERCUTANEOUS NEPHROLITHOTOMY Right 4/21/2023    Procedure: NEPHROLITHOTOMY, PERCUTANEOUS;  Surgeon: Chirag Russ MD;  Location: [facility] OR 2ND FLR;  Service: Urology;  Laterality: Right;  2.5 hrs    PERCUTANEOUS NEPHROSTOMY  4/21/2023    Procedure: CREATION, NEPHROSTOMY, PERCUTANEOUS with  removal of existing nephrostomy tube;  Surgeon: Chirag Russ MD;  Location: [facility] OR 2ND FLR;  Service: Urology;;    PYELOSCOPY Right 10/27/2022    Procedure: PYELOSCOPY;  Surgeon: Chirag Russ MD;  Location: [facility] OR [facility] FLR;  Service: Urology;  Laterality: Right;    REPLACEMENT OF NEPHROSTOMY TUBE Right 10/27/2022    Procedure: REPLACEMENT, NEPHROSTOMY TUBE;  Surgeon: Chirag Russ MD;  [facility];  Service: Urology;  Laterality: Right;    RETROPERITONEAL LYMPHADENECTOMY Right 9/17/2018    Procedure: LYMPHADENECTOMY, RETROPERITONEUM;  Surgeon: Sebas Reed MD;  [facility];  Service: General;  Laterality: Right;  ILIAC    REVISION OF ANASTOMOSIS OF URETER TO ILEAL CONDUIT N/A 2/6/2024    Procedure: REVISION, ANASTOMOSIS, URETEROILEAL;  Surgeon: Leslie Balbuena MD;  [facility];  Service: Urology;  Laterality: N/A;    URETEROSCOPIC REMOVAL OF URETERIC CALCULUS Right 10/27/2022    Procedure: REMOVAL, CALCULUS, URETER, URETEROSCOPIC;  Surgeon: Chirag Russ MD;  Location: [facility] OR [address] [place] [address];  Service: Urology;  Laterality: Right;    URETEROSCOPY Right 10/27/2022    Procedure: URETEROSCOPY-ANTEGRADE;  Surgeon: Chirag Russ MD;  Location: [facility] OR [address] [place] [address];  Service: Urology;  Laterality: Right;       Time Tracking:     PT Received On: 02/07/24  PT Start Time: 0923     PT Stop Time: 0955  PT Total Time (min): 32 min     Billable Minutes: Evaluation 15 and Gait Training 17      02/07/2024

## 2024-02-07 NOTE — PLAN OF CARE
Problem: Physical Therapy  Goal: Physical Therapy Goal  Description: Goals to be met by: 2024     Patient will increase functional independence with mobility by performin. Supine to sit with Set-up [place]  2. Sit to supine with Set-up [place]  3. Sit to stand transfer with Supervision  4. Bed to chair transfer with Supervision using Rolling Walker  5. Gait  x 250 feet with Supervision using Rolling Walker.   6. Ascend/descend 4 stair with left Handrails Stand-by Assistance using No Assistive Device.   7. Lower extremity exercise program x15 reps per handout, with supervision    Outcome: Ongoing, Progressing

## 2024-02-07 NOTE — SUBJECTIVE & OBJECTIVE
Past Medical History:   Diagnosis Date    Abnormal mammogram 08/25/2020    Abnormal mammogram 08/25/2020    Acute blood loss anemia     Acute deep vein thrombosis (DVT) of lower extremity 12/09/2020    Advance care planning 04/30/2021    ODESSA (acute kidney injury) 03/21/2020    Anemia due to chronic blood loss     Anemia due to chronic kidney disease     Anemia due to chronic kidney disease 05/18/2020    Anxiety     Bilateral ureteral obstruction 09/11/2020    Bilious vomiting with nausea 06/11/2023    Cardiovascular event risk -- low 09/14/2015    ASCVD 10-year risk 1.9% (with optimal risk factors 1.3%) as of 9/14/2015     Cervical cancer 2014    Chronic back pain     Colostomy care     Deep vein thrombosis     Depression     Diarrhea due to malabsorption 11/14/2018    Difficult intubation     Discharge planning issues 04/30/2021    Disorder of kidney and ureter     DVT of lower extremity, bilateral 11/04/2020    Edema 04/10/2023    Emphysema of lung 04/10/2023    Fibromyalgia     Fungemia 09/27/2020    Generalized abdominal pain 08/25/2020    GERD (gastroesophageal reflux disease)     Hemifacial spasm 09/16/2015    Hiatal hernia 2014    History of cervical cancer 10/11/2018    History of essential hypertension 05/04/2015    Not requiring medications at this time  BP is low- concern that this may correlate with increased stool output from stoma. Encourage her to contact GI and her PCP.    Hx of psychiatric care     Cymbalta, trazodone    Hypertension     Hypomagnesemia 11/21/2018    Hyponatremia 09/10/2023    Impaired functional mobility, balance, gait, and endurance 07/24/2015    Lactose intolerance     Major depressive disorder, recurrent episode, moderate 06/02/2023    Metastatic squamous cell carcinoma to lymph node 10/11/2018    Moderate episode of recurrent major depressive disorder 04/21/2021    Nephrostomy complication 10/26/2023    Neuropathy due to chemotherapeutic drug 11/14/2018    Osteoarthritis of back      Peritonitis 09/22/2020    Physical deconditioning 04/30/2021    Pseudomonas urinary tract infection 04/21/2021    Psychiatric problem     Pyelitis 09/09/2023    QT prolongation 04/16/2021    Refusal of blood transfusions as patient is Taoism     Schatzki's ring 09/14/2015    Seen on outside EGD 05/2014, underwent esophageal dilatation. Bx were negative.     Seizure-like activity 12/02/2018    Seizures     Sleep stage dysfunction     Noted on PSG 06/2017; negative for obstructive sleep apnea     Stroke     Urinary tract infection associated with nephrostomy catheter 06/11/2020    Wound infection after surgery 09/24/2020       Past Surgical History:   Procedure Laterality Date    ANASTOMOSIS OF INTESTINE N/A 2/6/2024    Procedure: ANASTOMOSIS, INTESTINE - Ileocolic anastomosis;  Surgeon: Hammad Reynolds MD;  Location: [facility] OR 2ND FLR;  Service: Colon and Rectal;  Laterality: N/A;    ANTEGRADE NEPHROSTOGRAPHY Bilateral 9/28/2020    Procedure: Nephrostogram - antegrade;  Surgeon: Leslie Balbuena MD;  Location: [facility] OR 1ST FLR;  Service: Urology;  Laterality: Bilateral;    ANTEGRADE NEPHROSTOGRAPHY Left 4/20/2021    Procedure: NEPHROSTOGRAM, ANTEGRADE;  Surgeon: Leslie Balbuena MD;  Location: [facility] OR 1ST FLR;  Service: Urology;  Laterality: Left;    ANTEGRADE NEPHROSTOGRAPHY Right 10/27/2022    Procedure: NEPHROSTOGRAM, ANTEGRADE;  Surgeon: Chirag Russ MD;  Location: [facility] OR [facility] FLR;  Service: Urology;  Laterality: Right;    ANTEGRADE NEPHROSTOGRAPHY Right 4/21/2023    Procedure: NEPHROSTOGRAM, ANTEGRADE;  Surgeon: Chirag Russ MD;  Location: [facility] OR 2ND FLR;  Service: Urology;  Laterality: Right;    ANTEGRADE NEPHROSTOGRAPHY Left 6/6/2023    Procedure: Nephrostogram - antegrade;  Surgeon: Leslie Balbuena MD;  Location: [facility] OR 1ST FLR;  Service: Urology;  Laterality: Left;    BILATERAL OOPHORECTOMY  2015    CHOLECYSTECTOMY  11/09/2016    Done at [facility], path showed chronic  cholecystitis and gallstones    cold knife conization  2014    COLECTOMY, RIGHT  9/17/2020    Procedure: COLECTOMY, RIGHT Extended;  Surgeon: Hammad Reynolds MD;  Location: [facility] OR 2ND FLR;  Service: Colon and Rectal;;    COLONOSCOPY  2014    COLONOSCOPY N/A 10/24/2016    at [facility], Dr Gutiérrez    COLONOSCOPY N/A 5/18/2018    Procedure: COLONOSCOPY;  Surgeon: Arden Gutiérrez MD;  Location: [facility] (4TH FLR);  Service: Endoscopy;  Laterality: N/A;    COLONOSCOPY N/A 7/28/2020    Procedure: COLONOSCOPY;  Surgeon: Hammad Reynolds MD;  Location: [facility] (4TH FLR);  Service: Colon and Rectal;  Laterality: N/A;  covid test [place] 7/25    CYSTOSCOPY WITH URETEROSCOPY, RETROGRADE PYELOGRAPHY, AND INSERTION OF STENT Bilateral 3/21/2020    Procedure: CYSTOSCOPY, WITH RETROGRADE PYELOGRAM,;  Surgeon: Leslie Balbuena MD;  Location: [facility] OR 1ST FLR;  Service: Urology;  Laterality: Bilateral;    DILATION OF NEPHROSTOMY TRACT Right 10/27/2022    Procedure: DILATION, NEPHROSTOMY TRACT;  Surgeon: Chirag Russ MD;  Location: [facility] OR 1ST FLR;  Service: Urology;  Laterality: Right;    ESOPHAGOGASTRODUODENOSCOPY  2014    ESOPHAGOGASTRODUODENOSCOPY  11/18/2020    ESOPHAGOGASTRODUODENOSCOPY N/A 11/18/2020    Procedure: ESOPHAGOGASTRODUODENOSCOPY (EGD);  Surgeon: Zenon Spencer MD;  Location: [facility];  Service: Endoscopy;  Laterality: N/A;    ESOPHAGOGASTRODUODENOSCOPY N/A 12/11/2020    Procedure: EGD (ESOPHAGOGASTRODUODENOSCOPY);  Surgeon: Juancho Muse MD;  Location: [facility] ENDO (2ND FLR);  Service: Endoscopy;  Laterality: N/A;    EXCISION, SMALL INTESTINE  2/6/2024    Procedure: EXCISION, SMALL INTESTINE;  Surgeon: Hammad Reynolds MD;  Location: [facility] OR 2ND FLR;  Service: Colon and Rectal;;    HYSTERECTOMY  2014    [facility] for cervical cancer    ILEOSTOMY  9/17/2020    Procedure: CREATION, ILEOSTOMY;  Surgeon: Hammad Reynolds MD;  Location: [facility] OR 2ND FLR;  Service: Colon and Rectal;;    ILEOSTOMY CLOSURE N/A 2/6/2024     Procedure: CLOSURE, ILEOSTOMY;  Surgeon: Hammad Reynolds MD;  [facility] 2ND FLR;  Service: Colon and Rectal;  Laterality: N/A;    LAPAROTOMY, EXPLORATORY N/A 2/6/2024    Procedure: OPEN LAPAROTOMY, EXPLORATORY;  Surgeon: Leslie Balbuena MD;  [facility] OR 2ND FLR;  Service: Urology;  Laterality: N/A;  22 modifier    LOOPOGRAM N/A 4/20/2021    Procedure: LOOPOGRAM;  Surgeon: Leslie Balbuena MD;  [facility] OR 1ST FLR;  Service: Urology;  Laterality: N/A;    LOOPOGRAM N/A 6/6/2023    Procedure: LOOPOGRAM;  Surgeon: Leslie Balbuena MD;  [facility] OR 1ST FLR;  Service: Urology;  Laterality: N/A;    LYSIS OF ADHESIONS  2/6/2024    Procedure: LYSIS, ADHESIONS;  Surgeon: Leslie Balbuena MD;  [facility] OR 2ND FLR;  Service: Urology;;    LYSIS OF ADHESIONS  2/6/2024    Procedure: LYSIS, ADHESIONS;  Surgeon: Hammad Reynolds MD;  [facility] 2ND FLR;  Service: Colon and Rectal;;    MOBILIZATION OF SPLENIC FLEXURE N/A 9/11/2020    Procedure: MOBILIZATION, COLONIC;  Surgeon: Hammad Reynolds MD;  [facility] OR 2ND FLR;  Service: Colon and Rectal;  Laterality: N/A;    NEPHROSTOGRAPHY Bilateral 4/17/2021    Procedure: Nephrostogram;  Surgeon: Celeste Surgeon;  Location: [facility];  Service: Anesthesiology;  Laterality: Bilateral;    OOPHORECTOMY      PERCUTANEOUS NEPHROLITHOTOMY Right 4/21/2023    Procedure: NEPHROLITHOTOMY, PERCUTANEOUS;  Surgeon: Chirag Russ MD;  Location: [facility] OR 2ND FLR;  Service: Urology;  Laterality: Right;  2.5 hrs    PERCUTANEOUS NEPHROSTOMY  4/21/2023    Procedure: CREATION, NEPHROSTOMY, PERCUTANEOUS with removal of existing nephrostomy tube;  Surgeon: Chirag Russ MD;  Location: [facility] OR 2ND FLR;  Service: Urology;;    PYELOSCOPY Right 10/27/2022    Procedure: PYELOSCOPY;  Surgeon: Chirag Russ MD;  Location: [facility] OR 1ST FLR;  Service: Urology;  Laterality: Right;    REPLACEMENT OF NEPHROSTOMY TUBE Right 10/27/2022    Procedure: REPLACEMENT,  NEPHROSTOMY TUBE;  Surgeon: Chirag Russ MD;  Location: [facility] OR 1ST FLR;  Service: Urology;  Laterality: Right;    RETROPERITONEAL LYMPHADENECTOMY Right 9/17/2018    Procedure: LYMPHADENECTOMY, RETROPERITONEUM;  Surgeon: Sebas Reed MD;  Location: [facility] OR 2ND FLR;  Service: General;  Laterality: Right;  ILIAC    REVISION OF ANASTOMOSIS OF URETER TO ILEAL CONDUIT N/A 2/6/2024    Procedure: REVISION, ANASTOMOSIS, URETEROILEAL;  Surgeon: Leslie Balbuena MD;  Location: [facility] OR 2ND FLR;  Service: Urology;  Laterality: N/A;    URETEROSCOPIC REMOVAL OF URETERIC CALCULUS Right 10/27/2022    Procedure: REMOVAL, CALCULUS, URETER, URETEROSCOPIC;  Surgeon: Chirag Russ MD;  [facility];  Service: Urology;  Laterality: Right;    URETEROSCOPY Right 10/27/2022    Procedure: URETEROSCOPY-ANTEGRADE;  Surgeon: hCirag Russ MD;  Location: [facility] OR [address] [place] [address];  Service: Urology;  Laterality: Right;       Review of patient's allergies indicates:   Allergen Reactions    Bee sting [allergen ext-venom-honey bee]      Rash      Grass pollen-bermuda, standard      rash       Medications:  Medications Prior to Admission   Medication Sig    ferrous sulfate (FEOSOL) 325 mg (65 mg iron) Tab tablet Take 1 tablet (325 mg total) by mouth 2 (two) times daily.    loratadine (CLARITIN) 10 mg tablet Take 10 mg by mouth daily as needed for Allergies.    mirtazapine (REMERON) 30 MG tablet Take 1 tablet (30 mg total) by mouth nightly.    ondansetron (ZOFRAN-ODT) 4 MG TbDL Dissolve 1 tablet (4 mg total) by mouth every 8 (eight) hours as needed (nausea).    sodium bicarbonate 650 MG tablet Take 2 tablets (1,300 mg total) by mouth 2 (two) times daily.     Antibiotics (From admission, onward)      Start     Stop Route Frequency Ordered    02/06/24 2100  mupirocin 2 % ointment         02/09/24 0859 Nasl 2 times daily 02/06/24 1725          Antifungals (From admission, onward)      None          Antivirals (From admission,  onward)      None             Immunization History   Administered Date(s) Administered    Influenza - Quadrivalent - PF *Preferred* (6 months and older) 2017, 2019    Tdap 2017       Family History       Problem Relation (Age of Onset)    Breast cancer Maternal Aunt    Cancer Father, Mother    Cervical cancer Mother    Colon cancer Father    Drug abuse Daughter, Daughter    Esophageal cancer Father    Heart disease Sister, Maternal Grandmother    Suicide Daughter          Social History     Socioeconomic History    Marital status:      Spouse name: Hammad    Number of children: 2   Tobacco Use    Smoking status: Never    Smokeless tobacco: Never   Substance and Sexual Activity    Alcohol use: No     Alcohol/week: 0.0 standard drinks of alcohol    Drug use: No    Sexual activity: Yes     Partners: Male     Birth control/protection: None     Comment:  19 years since    Social History Narrative    , twin daughters (1  2018), disabled due to childhood stroke, [employer] [place]     Social Determinants of Health     Financial Resource Strain: Medium Risk (2024)    Overall Financial Resource Strain (CARDIA)     Difficulty of Paying Living Expenses: Somewhat hard   Food Insecurity: No Food Insecurity (2024)    Hunger Vital Sign     Worried About Running Out of Food in the Last Year: Never true     Ran Out of Food in the Last Year: Never true   Transportation Needs: No Transportation Needs (2024)    PRAPARE - Transportation     Lack of Transportation (Medical): No     Lack of Transportation (Non-Medical): No   Physical Activity: Insufficiently Active (2024)    Exercise Vital Sign     Days of Exercise per Week: 4 days     Minutes of Exercise per Session: 20 min   Stress: Stress Concern Present (2024)    [employer] - Occupational Stress Questionnaire     Feeling of Stress : To some extent   Social Connections:  Moderately Integrated (2/7/2024)    Social Connection and Isolation Panel [NHANES]     Frequency of Communication with Friends and Family: Three times a week     Frequency of Social Gatherings with Friends and Family: Once a week     Attends Bahai Services: 1 to 4 times per year     Active Member of Clubs or Organizations: No     Attends Club or Organization Meetings: Never     Marital Status:    Housing Stability: Low Risk  (2/7/2024)    Housing Stability Vital Sign     Unable to Pay for Housing in the Last Year: No     Number of Places Lived in the Last Year: 1     Unstable Housing in the Last Year: No     Review of Systems   Constitutional:  Positive for appetite change and fatigue. Negative for chills, diaphoresis and fever.   Respiratory:  Negative for cough, chest tightness and shortness of breath.    Cardiovascular:  Negative for chest pain and leg swelling.   Gastrointestinal:  Positive for abdominal pain. Negative for constipation and nausea.   Musculoskeletal:  Negative for arthralgias, back pain and joint swelling.   Skin:  Positive for wound. Negative for color change and rash.   Neurological:  Negative for headaches.   Psychiatric/Behavioral:  Negative for agitation and confusion. The patient is not nervous/anxious.      Objective:     Vital Signs (Most Recent):  Temp: 99.4 °F (37.4 °C) (02/07/24 1147)  Pulse: 77 (02/07/24 1147)  Resp: 12 (02/07/24 1147)  BP: 120/62 (02/07/24 1147)  SpO2: 96 % (02/07/24 1147) Vital Signs (24h Range):  Temp:  [97.4 °F (36.3 °C)-99.4 °F (37.4 °C)] 99.4 °F (37.4 °C)  Pulse:  [70-88] 77  Resp:  [10-20] 12  SpO2:  [96 %-100 %] 96 %  BP: (110-149)/(62-78) 120/62     Weight: 82 kg (180 lb 12.4 oz)  Body mass index is 26.7 kg/m².    Estimated Creatinine Clearance: 48.3 mL/min (based on SCr of 1.4 mg/dL).     Physical Exam  Vitals and nursing note reviewed.   Constitutional:       General: She is not in acute distress.     Appearance: Normal appearance. She is  well-developed. She is not diaphoretic.   HENT:      Head: Normocephalic and atraumatic.      Right Ear: External ear normal.      Left Ear: External ear normal.      Nose: Nose normal.      Comments: NG tube  Eyes:      General: No scleral icterus.        Right eye: No discharge.         Left eye: No discharge.      Extraocular Movements: Extraocular movements intact.      Conjunctiva/sclera: Conjunctivae normal.   Cardiovascular:      Rate and Rhythm: Normal rate and regular rhythm.   Pulmonary:      Effort: Pulmonary effort is normal. No respiratory distress.      Breath sounds: No stridor.   Abdominal:      Tenderness: There is abdominal tenderness.      Comments: Incision CDI; Urostomy  Prior ileostomy site packed; ORESTES drain with SS output      Genitourinary:     Comments: Left nephrostomy tube  Musculoskeletal:         General: Normal range of motion.   Skin:     Findings: No erythema or rash.   Neurological:      General: No focal deficit present.      Mental Status: She is alert and oriented to person, place, and time. Mental status is at baseline.      Cranial Nerves: No cranial nerve deficit.   Psychiatric:         Mood and Affect: Mood normal.         Behavior: Behavior normal.         Thought Content: Thought content normal.         Judgment: Judgment normal.          Significant Labs: CBC:   Recent Labs   Lab 02/06/24  1836 02/07/24  0242   WBC 16.20* 14.87*   HGB 13.0 12.5   HCT 42.0 40.1    297     CMP:   Recent Labs   Lab 02/06/24  0551 02/06/24  1836 02/07/24  0242    141 141   K 3.8 4.1 4.1   * 118* 117*   CO2 15* 12* 15*   GLU 88 144* 129*   BUN 13 11 13   CREATININE 1.4 1.4 1.4   CALCIUM 9.8 7.4* 8.3*   PROT  --  5.6*  --    ALBUMIN  --  2.3*  --    BILITOT  --  0.4  --    ALKPHOS  --  93  --    AST  --  28  --    ALT  --  22  --    ANIONGAP 8 11 9     Microbiology Results (last 7 days)       Procedure Component Value Units Date/Time    Urine culture [[id number]]  (Abnormal)  Collected: 02/06/24 0700    Order Status: Completed Specimen: Urine from Kidney, Left Updated: 02/07/24 0837     Urine Culture, Routine BACILLUS SPECIES  50,000 - 99,999 cfu/ml      Narrative:      Indicated criteria for high risk culture:->Prior to urologic  procedures          Recent Lab Results         02/07/24  0242   02/06/24  1836        Albumin   2.3       ALP   93       ALT   22       Anion Gap 9   11       AST   28       Baso # 0.01   0.02       Basophil % 0.1   0.1       BILIRUBIN TOTAL   0.4  Comment: For infants and newborns, interpretation of results should be based  on gestational age, weight and in agreement with clinical  observations.    Premature Infant recommended reference ranges:  Up to 24 hours.............<8.0 mg/dL  Up to 48 hours............<12.0 mg/dL  3-5 days..................<15.0 mg/dL  6-29 days.................<15.0 mg/dL         BUN 13   11       Calcium 8.3   7.4       Chloride 117   118       CO2 15   12       Creatinine 1.4   1.4       Differential Method Automated   Automated       eGFR 42.8   42.8       Eos # 0.0   0.0       Eos % 0.0   0.0       Glucose 129   144       Gran # (ANC) 12.6   14.4       Gran % 84.7   88.7       Hematocrit 40.1   42.0       Hemoglobin 12.5   13.0       Immature Grans (Abs) 0.05  Comment: Mild elevation in immature granulocytes is non specific and   can be seen in a variety of conditions including stress response,   acute inflammation, trauma and pregnancy. Correlation with other   laboratory and clinical findings is essential.     0.06  Comment: Mild elevation in immature granulocytes is non specific and   can be seen in a variety of conditions including stress response,   acute inflammation, trauma and pregnancy. Correlation with other   laboratory and clinical findings is essential.         Immature Granulocytes 0.3   0.4       Lymph # 1.0   0.7       Lymph % 6.9   4.0       Magnesium  2.0   2.0       MCH 28.6   28.4       MCHC 31.2   31.0        MCV 92   92       Mono # 1.2   1.1       Mono % 8.0   6.8       MPV 9.5   9.3       nRBC 0   0       Phosphorus Level 4.0   3.6       Platelet Count 297   303       Potassium 4.1   4.1       PROTEIN TOTAL   5.6       RBC 4.37   4.57       RDW 15.4   15.5       Sodium 141   141       WBC 14.87   16.20               Significant Imaging:       XR Gastric tube check, non-radiologist performed [[id number]] Resulted: 02/07/24 1150   Order Status: Completed Updated: 02/07/24 1153   Narrative:     EXAMINATION:  XR GASTRIC TUBE CHECK, NON-RADIOLOGIST PERFORMED    CLINICAL HISTORY:  NG tube confirmation;    TECHNIQUE:  Abdomen one view    COMPARISON:  N 02/06/2024 one    FINDINGS:  NG tube in the stomach similar to the previous study.  No significant bowel dilatation.  Postsurgical changes as before   Impression:       See above      Electronically signed by: Gokul Hardy MD  Date: 02/07/2024  Time: 11:50   X-Ray Abdomen AP 1 View [[id number]] Resulted: 02/07/24 0857   Order Status: Completed Updated: 02/07/24 0859   Narrative:     EXAMINATION:  XR ABDOMEN AP 1 VIEW    CLINICAL HISTORY:  confirm left ureteral stent placement;    TECHNIQUE:  Single AP View of the abdomen was performed.    COMPARISON:  11/04/2023    FINDINGS:  NG/OG tube terminates in the left upper quadrant.  Surgical clips and skin staples noted.  Several drainage catheters are noted over the left abdomen, including left nephrostomy.  Bowel gas pattern is nonobstructive.  No evidence for gross pneumoperitoneum.  Regional osseous structures are unremarkable.   Impression:       As above.      Electronically signed by: Ed Marcial MD  Date: 02/07/2024  Time: 08:57

## 2024-02-08 LAB
ANION GAP SERPL CALC-SCNC: 7 MMOL/L (ref 8–16)
BASOPHILS # BLD AUTO: 0.05 K/UL (ref 0–0.2)
BASOPHILS NFR BLD: 0.4 % (ref 0–1.9)
BUN SERPL-MCNC: 13 MG/DL (ref 8–23)
CALCIUM SERPL-MCNC: 8.5 MG/DL (ref 8.7–10.5)
CHLORIDE SERPL-SCNC: 114 MMOL/L (ref 95–110)
CO2 SERPL-SCNC: 24 MMOL/L (ref 23–29)
CREAT SERPL-MCNC: 1.1 MG/DL (ref 0.5–1.4)
DIFFERENTIAL METHOD BLD: ABNORMAL
EOSINOPHIL # BLD AUTO: 0.3 K/UL (ref 0–0.5)
EOSINOPHIL NFR BLD: 2.8 % (ref 0–8)
ERYTHROCYTE [DISTWIDTH] IN BLOOD BY AUTOMATED COUNT: 15.9 % (ref 11.5–14.5)
EST. GFR  (NO RACE VARIABLE): 57.2 ML/MIN/1.73 M^2
GLUCOSE SERPL-MCNC: 89 MG/DL (ref 70–110)
HCT VFR BLD AUTO: 31.6 % (ref 37–48.5)
HGB BLD-MCNC: 9.9 G/DL (ref 12–16)
IMM GRANULOCYTES # BLD AUTO: 0.04 K/UL (ref 0–0.04)
IMM GRANULOCYTES NFR BLD AUTO: 0.3 % (ref 0–0.5)
LYMPHOCYTES # BLD AUTO: 1.7 K/UL (ref 1–4.8)
LYMPHOCYTES NFR BLD: 14.6 % (ref 18–48)
MAGNESIUM SERPL-MCNC: 2 MG/DL (ref 1.6–2.6)
MCH RBC QN AUTO: 28.5 PG (ref 27–31)
MCHC RBC AUTO-ENTMCNC: 31.3 G/DL (ref 32–36)
MCV RBC AUTO: 91 FL (ref 82–98)
MONOCYTES # BLD AUTO: 1.4 K/UL (ref 0.3–1)
MONOCYTES NFR BLD: 11.8 % (ref 4–15)
NEUTROPHILS # BLD AUTO: 8.2 K/UL (ref 1.8–7.7)
NEUTROPHILS NFR BLD: 70.1 % (ref 38–73)
NRBC BLD-RTO: 0 /100 WBC
PHOSPHATE SERPL-MCNC: 2.1 MG/DL (ref 2.7–4.5)
PLATELET # BLD AUTO: 267 K/UL (ref 150–450)
PMV BLD AUTO: 10.4 FL (ref 9.2–12.9)
POTASSIUM SERPL-SCNC: 3.2 MMOL/L (ref 3.5–5.1)
RBC # BLD AUTO: 3.47 M/UL (ref 4–5.4)
SODIUM SERPL-SCNC: 145 MMOL/L (ref 136–145)
WBC # BLD AUTO: 11.77 K/UL (ref 3.9–12.7)

## 2024-02-08 PROCEDURE — 76937 US GUIDE VASCULAR ACCESS: CPT

## 2024-02-08 PROCEDURE — 97537 COMMUNITY/WORK REINTEGRATION: CPT

## 2024-02-08 PROCEDURE — 84100 ASSAY OF PHOSPHORUS: CPT | Performed by: STUDENT IN AN ORGANIZED HEALTH CARE EDUCATION/TRAINING PROGRAM

## 2024-02-08 PROCEDURE — 99233 SBSQ HOSP IP/OBS HIGH 50: CPT | Mod: ,,, | Performed by: PHYSICIAN ASSISTANT

## 2024-02-08 PROCEDURE — 25000003 PHARM REV CODE 250: Performed by: STUDENT IN AN ORGANIZED HEALTH CARE EDUCATION/TRAINING PROGRAM

## 2024-02-08 PROCEDURE — 36573 INSJ PICC RS&I 5 YR+: CPT

## 2024-02-08 PROCEDURE — C1751 CATH, INF, PER/CENT/MIDLINE: HCPCS

## 2024-02-08 PROCEDURE — 63600175 PHARM REV CODE 636 W HCPCS: Performed by: PHYSICIAN ASSISTANT

## 2024-02-08 PROCEDURE — A4217 STERILE WATER/SALINE, 500 ML: HCPCS

## 2024-02-08 PROCEDURE — A4216 STERILE WATER/SALINE, 10 ML: HCPCS | Performed by: UROLOGY

## 2024-02-08 PROCEDURE — 25000003 PHARM REV CODE 250: Performed by: PHYSICIAN ASSISTANT

## 2024-02-08 PROCEDURE — B4185 PARENTERAL SOL 10 GM LIPIDS: HCPCS | Performed by: UROLOGY

## 2024-02-08 PROCEDURE — 02HV33Z INSERTION OF INFUSION DEVICE INTO SUPERIOR VENA CAVA, PERCUTANEOUS APPROACH: ICD-10-PCS | Performed by: UROLOGY

## 2024-02-08 PROCEDURE — 83735 ASSAY OF MAGNESIUM: CPT | Performed by: STUDENT IN AN ORGANIZED HEALTH CARE EDUCATION/TRAINING PROGRAM

## 2024-02-08 PROCEDURE — 97116 GAIT TRAINING THERAPY: CPT

## 2024-02-08 PROCEDURE — 63600175 PHARM REV CODE 636 W HCPCS

## 2024-02-08 PROCEDURE — 20600001 HC STEP DOWN PRIVATE ROOM

## 2024-02-08 PROCEDURE — 85025 COMPLETE CBC W/AUTO DIFF WBC: CPT | Performed by: STUDENT IN AN ORGANIZED HEALTH CARE EDUCATION/TRAINING PROGRAM

## 2024-02-08 PROCEDURE — 25000003 PHARM REV CODE 250

## 2024-02-08 PROCEDURE — 97535 SELF CARE MNGMENT TRAINING: CPT

## 2024-02-08 PROCEDURE — 25000003 PHARM REV CODE 250: Performed by: UROLOGY

## 2024-02-08 PROCEDURE — 36415 COLL VENOUS BLD VENIPUNCTURE: CPT | Performed by: STUDENT IN AN ORGANIZED HEALTH CARE EDUCATION/TRAINING PROGRAM

## 2024-02-08 PROCEDURE — 80048 BASIC METABOLIC PNL TOTAL CA: CPT | Performed by: STUDENT IN AN ORGANIZED HEALTH CARE EDUCATION/TRAINING PROGRAM

## 2024-02-08 PROCEDURE — 63600175 PHARM REV CODE 636 W HCPCS: Performed by: STUDENT IN AN ORGANIZED HEALTH CARE EDUCATION/TRAINING PROGRAM

## 2024-02-08 RX ORDER — SODIUM,POTASSIUM PHOSPHATES 280-250MG
2 POWDER IN PACKET (EA) ORAL EVERY 4 HOURS
Status: COMPLETED | OUTPATIENT
Start: 2024-02-08 | End: 2024-02-08

## 2024-02-08 RX ORDER — LIDOCAINE HYDROCHLORIDE 10 MG/ML
1 INJECTION INFILTRATION; PERINEURAL ONCE AS NEEDED
Status: DISCONTINUED | OUTPATIENT
Start: 2024-02-08 | End: 2024-02-18 | Stop reason: HOSPADM

## 2024-02-08 RX ORDER — SODIUM CHLORIDE 0.9 % (FLUSH) 0.9 %
10 SYRINGE (ML) INJECTION EVERY 6 HOURS
Status: DISCONTINUED | OUTPATIENT
Start: 2024-02-08 | End: 2024-02-18 | Stop reason: HOSPADM

## 2024-02-08 RX ORDER — SODIUM CHLORIDE 0.9 % (FLUSH) 0.9 %
10 SYRINGE (ML) INJECTION
Status: DISCONTINUED | OUTPATIENT
Start: 2024-02-08 | End: 2024-02-18 | Stop reason: HOSPADM

## 2024-02-08 RX ADMIN — POTASSIUM & SODIUM PHOSPHATES POWDER PACK 280-160-250 MG 2 PACKET: 280-160-250 PACK at 09:02

## 2024-02-08 RX ADMIN — OXYCODONE HYDROCHLORIDE 10 MG: 10 TABLET ORAL at 06:02

## 2024-02-08 RX ADMIN — HEPARIN SODIUM 5000 UNITS: 5000 INJECTION INTRAVENOUS; SUBCUTANEOUS at 09:02

## 2024-02-08 RX ADMIN — Medication 6 MG: at 09:02

## 2024-02-08 RX ADMIN — POTASSIUM & SODIUM PHOSPHATES POWDER PACK 280-160-250 MG 2 PACKET: 280-160-250 PACK at 02:02

## 2024-02-08 RX ADMIN — ERTAPENEM 1 G: 1 INJECTION INTRAMUSCULAR; INTRAVENOUS at 02:02

## 2024-02-08 RX ADMIN — METHOCARBAMOL 500 MG: 500 TABLET ORAL at 01:02

## 2024-02-08 RX ADMIN — Medication 10 ML: at 05:02

## 2024-02-08 RX ADMIN — HEPARIN SODIUM 5000 UNITS: 5000 INJECTION INTRAVENOUS; SUBCUTANEOUS at 06:02

## 2024-02-08 RX ADMIN — PREGABALIN 150 MG: 150 CAPSULE ORAL at 09:02

## 2024-02-08 RX ADMIN — MUPIROCIN: 20 OINTMENT TOPICAL at 09:02

## 2024-02-08 RX ADMIN — METHOCARBAMOL 500 MG: 500 TABLET ORAL at 08:02

## 2024-02-08 RX ADMIN — ASCORBIC ACID, VITAMIN A PALMITATE, CHOLECALCIFEROL, THIAMINE HYDROCHLORIDE, RIBOFLAVIN-5 PHOSPHATE SODIUM, PYRIDOXINE HYDROCHLORIDE, NIACINAMIDE, DEXPANTHENOL, ALPHA-TOCOPHEROL ACETATE, VITAMIN K1, FOLIC ACID, BIOTIN, CYANOCOBALAMIN: 200; 3300; 200; 6; 3.6; 6; 40; 15; 10; 150; 600; 60; 5 INJECTION, SOLUTION INTRAVENOUS at 10:02

## 2024-02-08 RX ADMIN — CETIRIZINE HYDROCHLORIDE 10 MG: 10 TABLET, FILM COATED ORAL at 08:02

## 2024-02-08 RX ADMIN — MIRTAZAPINE 30 MG: 15 TABLET, FILM COATED ORAL at 09:02

## 2024-02-08 RX ADMIN — HYDROMORPHONE HYDROCHLORIDE 1 MG: 1 INJECTION, SOLUTION INTRAMUSCULAR; INTRAVENOUS; SUBCUTANEOUS at 03:02

## 2024-02-08 RX ADMIN — SMOFLIPID 250 ML: 6; 6; 5; 3 INJECTION, EMULSION INTRAVENOUS at 10:02

## 2024-02-08 RX ADMIN — METHOCARBAMOL 500 MG: 500 TABLET ORAL at 05:02

## 2024-02-08 RX ADMIN — METHOCARBAMOL 500 MG: 500 TABLET ORAL at 09:02

## 2024-02-08 RX ADMIN — SODIUM BICARBONATE 650 MG TABLET 1300 MG: at 08:02

## 2024-02-08 RX ADMIN — POTASSIUM & SODIUM PHOSPHATES POWDER PACK 280-160-250 MG 2 PACKET: 280-160-250 PACK at 05:02

## 2024-02-08 RX ADMIN — SODIUM CHLORIDE, POTASSIUM CHLORIDE, SODIUM LACTATE AND CALCIUM CHLORIDE: 600; 310; 30; 20 INJECTION, SOLUTION INTRAVENOUS at 03:02

## 2024-02-08 RX ADMIN — OXYCODONE HYDROCHLORIDE 10 MG: 10 TABLET ORAL at 12:02

## 2024-02-08 RX ADMIN — HEPARIN SODIUM 5000 UNITS: 5000 INJECTION INTRAVENOUS; SUBCUTANEOUS at 02:02

## 2024-02-08 RX ADMIN — ACETAMINOPHEN 1000 MG: 500 TABLET ORAL at 09:02

## 2024-02-08 RX ADMIN — ACETAMINOPHEN 1000 MG: 500 TABLET ORAL at 06:02

## 2024-02-08 RX ADMIN — MUPIROCIN: 20 OINTMENT TOPICAL at 08:02

## 2024-02-08 RX ADMIN — HYDROMORPHONE HYDROCHLORIDE 1 MG: 1 INJECTION, SOLUTION INTRAMUSCULAR; INTRAVENOUS; SUBCUTANEOUS at 09:02

## 2024-02-08 RX ADMIN — SODIUM BICARBONATE 650 MG TABLET 1300 MG: at 09:02

## 2024-02-08 RX ADMIN — ACETAMINOPHEN 1000 MG: 500 TABLET ORAL at 02:02

## 2024-02-08 NOTE — PROGRESS NOTES
Colon and Rectal Surgery Progress Note    Patient name: Edita Riley   YOB: 1965   MRN: [id number]    Patient Name: Edita Riley  Date: 2/8/2024    Subjective:  NAEO. Pain controlled. NG with 800mL out. +flatus, had small liquid BM. Has not ambulated yet.   Medications:    Current Facility-Administered Medications:     acetaminophen tablet 1,000 mg, 1,000 mg, Oral, Q8H, Marlo Gabriel MD, 1,000 mg at 02/07/24 2233    cetirizine tablet 10 mg, 10 mg, Oral, Daily, Nolan Hartman MD, 10 mg at 02/07/24 0848    ertapenem (INVANZ) 1 g in sodium chloride 0.9 % 100 mL IVPB (MB+), 1 g, Intravenous, Q24H, Megha Zhang PA-C, Stopped at 02/07/24 1432    heparin (porcine) injection 5,000 Units, 5,000 Units, Subcutaneous, Q8H, Nolan Hartman MD, 5,000 Units at 02/07/24 2233    HYDROmorphone injection 1 mg, 1 mg, Intravenous, Q6H PRN, Marlo Gabriel MD, 1 mg at 02/07/24 1136    lactated ringers infusion, , Intravenous, Continuous, Nolan Hartman MD, Last Rate: 125 mL/hr at 02/07/24 2237, New Bag at 02/07/24 2237    melatonin tablet 6 mg, 6 mg, Oral, Nightly PRN, Nolan Hartman MD, 6 mg at 02/07/24 2235    methocarbamoL tablet 500 mg, 500 mg, Oral, QID, Marlo Gabriel MD, 500 mg at 02/07/24 2233    mirtazapine tablet 30 mg, 30 mg, Oral, Nightly, Nolan Hartman MD, 30 mg at 02/07/24 2233    mupirocin 2 % ointment, , Nasal, BID, Nolan Hartman MD, Given at 02/07/24 2235    ondansetron injection 4 mg, 4 mg, Intravenous, Q6H PRN, Nolan Hartman MD, 4 mg at 02/06/24 2350    oxyCODONE immediate release tablet 5 mg, 5 mg, Oral, Q6H PRN, Marlo Gabriel MD    oxyCODONE immediate release tablet Tab 10 mg, 10 mg, Oral, Q6H PRN, Marlo Gabriel MD, 10 mg at 02/07/24 2235    pregabalin capsule 150 mg, 150 mg, Oral, QHS, Nolan Hartman MD, 150 mg at 02/07/24 2233    prochlorperazine injection Soln 5 mg, 5 mg, Intravenous, Q6H PRN, Nolan Hartman MD    sodium  bicarbonate tablet 1,300 mg, 1,300 mg, Oral, BID, Nolan Hartman MD, 1,300 mg at 02/07/24 2233    sodium chloride 0.9% flush 10 mL, 10 mL, Intravenous, PRN, Nolan Hartman MD    Vital Signs:  Vitals:    02/08/24 0433   BP: (!) 151/75   Pulse: 73   Resp: 18   Temp: 98.2 °F (36.8 °C)       In/Out:  Intake/Output - Last 3 Shifts         02/06 0700 02/07 0659 02/07 0700 02/08 0659    P.O. 0     I.V. (mL/kg) 997.4 (12.2) 1421.4 (17.3)    NG/GT  50    IV Piggyback 6293.7 57.3    Total Intake(mL/kg) 7291.1 (88.9) 1528.7 (18.6)    Urine (mL/kg/hr) 1000 (0.5) 1200 (0.6)    Emesis/NG output 0     Drains 55 1025    Stool 0 0    Blood 300     Total Output 1355 2225    Net +5936.1 -696.3          Urine Occurrence 1 x     Stool Occurrence 0 x 0 x    Emesis Occurrence 0 x             Physical Exam:  General: Alert, oriented, in no apparent distress  HEENT: Sclera anicteric, trachea midline  Lungs: Normal respiratory rate and effort on room air  Abdomen: Soft, appropriate socorro-incisional TTP, nondistended. Incisions clean/dry/intact without erythema or drainage. ORESTES drains SS output. Previous ileostomy site packing removed. Urostomy with stent in place.   Extremities: Warm, well perfused, no edema, SCDs in place  Neuro: Grossly intact, moves all extremities  Psych: Appropriate affect    Laboratory Studies:  Complete Blood Count:  Lab Results   Component Value Date/Time    WBC 14.87 (H) 02/07/2024 02:42 AM    HGB 12.5 02/07/2024 02:42 AM    HCT 40.1 02/07/2024 02:42 AM    HCT 43 12/12/2023 01:33 PM    RBC 4.37 02/07/2024 02:42 AM     02/07/2024 02:42 AM       Basic Chemistry Panel:  Lab Results   Component Value Date/Time     02/07/2024 02:42 AM    K 4.1 02/07/2024 02:42 AM     (H) 02/07/2024 02:42 AM    CO2 15 (L) 02/07/2024 02:42 AM    BUN 13 02/07/2024 02:42 AM    CREATININE 1.4 02/07/2024 02:42 AM    GLUCOSE 140 (H) 04/30/2020 05:13 AM    CALCIUM 8.3 (L) 02/07/2024 02:42 AM       Lab Results   Component  Value Date/Time    CRP 1.1 12/13/2020 03:09 AM       Imaging Studies:  None new    Assessment:  Edita Riley is a 59 y.o. year old female with history of ureteral stenosis after radiation for cervical cancer requiring urostomy (transverse colon conduit) complicated by anastomotic leak requiring right colectomy and end ileostomy now s/p ex lap, extensive maliha, revision of urostomy, ileostomy reversal w/ ileosigmoid anastomosis on 2/6    Plan:  POD2  Doing ok, working on pain control today  Prn anti-emetics  NG in place, 800mL out yesterday after replacement. Continue NPO, ok for ice chips for comfort  Starting to pass flatus. Small liquid BM overnight. Will discuss NG removal soon if continues to have Bms.   Ambulate, IS  AROBF  Packing removed from previous ileostomy site  Labs pending, crp tomorrow  Rest per primary    Bill Mercado MD  Colorectal Surgery Fellow

## 2024-02-08 NOTE — PT/OT/SLP PROGRESS
Occupational Therapy   Treatment    Name: Edita Riley  MRN: [id number]  Admitting Diagnosis:  Hydronephrosis of left kidney  2 Days Post-Op    Recommendations:     Discharge Recommendations: Low Intensity Therapy  Discharge Equipment Recommendations:  none  Barriers to discharge:  None    Assessment:     Edita Riley is a 59 y.o. female with a medical diagnosis of Hydronephrosis of left kidney.  She presents with the following performance deficits affecting function: weakness, impaired endurance, impaired self care skills, impaired functional mobility, gait instability, impaired balance, pain, decreased safety awareness. Pt with good participation and activity tolerance today.     Rehab Prognosis:  Good; patient would benefit from acute skilled OT services to address these deficits and reach maximum level of function.       Plan:     Patient to be seen 4 x/week to address the above listed problems via self-care/home management, therapeutic activities, therapeutic exercises, neuromuscular re-education  Plan of Care Expires: 03/08/24  Plan of Care Reviewed with: patient    Subjective     Chief Complaint: None stated  Patient/Family Comments/goals: return home  Pain/Comfort:  Pain Rating 1: 0/10    Objective:     Communicated with: Nsg prior to session.  Patient found ambulatory in room/fonseca with PT  with NG tube, colostomy, peripheral IV, ORESTES drain upon OT entry to room.    General Precautions: Standard, fall    Orthopedic Precautions:N/A  Braces: N/A  Respiratory Status: Room air     Occupational Performance:     Bed Mobility:    Patient completed Scooting/Bridging with stand by assistance  Patient completed Sit to Supine with minimum assistance     Functional Mobility/Transfers:  Patient completed Sit <> Stand Transfer with contact guard assistance  with  rolling walker   Patient completed Bed <> Chair Transfer using Stand Pivot technique with stand by assistance with no assistive  device  Functional Mobility: Pt ambulating c/ PT community level c/ SBA prior to handoff. Pt able to simulate crossing street by stopping at designated point and looking both ways     Activities of Daily Living:  Grooming: setup A facial H in bed with wash cloth      AMPAC 6 Click ADL: 18    Treatment & Education:  Pt educated on role and purpose of therapy  Pt educated on goal setting  Pt educated on benefits of OOB activity  Pt educated on self advocacy     Patient left supine with all lines intact, call button in reach, nsg notified, and PICC team present    GOALS:   Multidisciplinary Problems       Occupational Therapy Goals          Problem: Occupational Therapy    Goal Priority Disciplines Outcome Interventions   Occupational Therapy Goal     OT, PT/OT Ongoing, Progressing    Description: Goals to be met by: 2/14/24    Patient will increase functional independence with ADLs by performing:    LE Dressing with Stand-by Assistance.  Grooming while standing at sink with Set-up Assistance.  Toileting from toilet with Stand-by Assistance for hygiene and clothing management.   Supine to sit with Supervision.  Toilet transfer to toilet with Supervision.                         Time Tracking:     OT Date of Treatment: 02/08/24  OT Start Time: 1102  OT Stop Time: 1125  OT Total Time (min): 23 min    Billable Minutes:Self Care/Home Management 12  Community/Work Re-entry 11    OT/HARPREET: OT          2/8/2024

## 2024-02-08 NOTE — PT/OT/SLP PROGRESS
Physical Therapy Treatment    Patient Name:  Edita Riley   MRN:  [id number]    Recommendations:     Discharge Recommendations: Low Intensity Therapy  Discharge Equipment Recommendations: none  Barriers to discharge: None    Assessment:     Edita Riley is a 59 y.o. female admitted with a medical diagnosis of Hydronephrosis of left kidney.  She presents with the following impairments/functional limitations: weakness, impaired endurance, impaired self care skills, impaired functional mobility, gait instability, impaired balance Pt. cooperative and tolerated treatment well. Pt. progressing with mobility.    Rehab Prognosis: Good; patient would benefit from acute skilled PT services to address these deficits and reach maximum level of function.    Recent Surgery: Procedure(s) (LRB):  REVISION, ANASTOMOSIS, URETEROILEAL (N/A)  OPEN LAPAROTOMY, EXPLORATORY (N/A)  CLOSURE, ILEOSTOMY (N/A)  ANASTOMOSIS, INTESTINE - Ileocolic anastomosis (N/A)  EXCISION, SMALL INTESTINE  LYSIS, ADHESIONS  LYSIS, ADHESIONS 2 Days Post-Op    Plan:     During this hospitalization, patient to be seen 4 x/week to address the identified rehab impairments via gait training, therapeutic activities, therapeutic exercises and progress toward the following goals:    Plan of Care Expires:  03/08/24    Subjective     Chief Complaint: no new c/o  Patient/Family Comments/goals: pt. Agreeable to PT  Pain/Comfort:  Pain Rating 1:  (pt. did not rate)  Location 1: abdomen      Objective:     Communicated with nursing prior to session.  Patient found supine with NG tube, ORESTES drain, colostomy, peripheral IV (urostomy, suprapubic catheter) upon PT entry to room.     General Precautions: Standard, fall  Orthopedic Precautions: N/A  Braces: N/A  Respiratory Status: Room air     Functional Mobility:  Bed Mobility:     Rolling Right: stand by assistance  Scooting: stand by assistance  Supine to Sit: stand by assistance  Transfers:     Sit to  Stand:  stand by assistance with rolling walker  Gait: >400' with RW and SBA with slow, steady gait without LOB  Balance: fair+  Stairs:  Pt ascended/descended 3 stair(s) x2 trials with No Assistive Device with left handrail and HHA with Contact Guard Assistance.       AM-PAC 6 CLICK MOBILITY  Turning over in bed (including adjusting bedclothes, sheets and blankets)?: 3  Sitting down on and standing up from a chair with arms (e.g., wheelchair, bedside commode, etc.): 3  Moving from lying on back to sitting on the side of the bed?: 3  Moving to and from a bed to a chair (including a wheelchair)?: 3  Need to walk in hospital room?: 3  Climbing 3-5 steps with a railing?: 3  Basic Mobility Total Score: 18       Treatment & Education:  Discussed pt.'s progress, goals, and POC.    Patient left up in chair with all lines intact, call button in reach, and OT present..    GOALS:   Multidisciplinary Problems       Physical Therapy Goals          Problem: Physical Therapy    Goal Priority Disciplines Outcome Goal Variances Interventions   Physical Therapy Goal     PT, PT/OT Ongoing, Progressing     Description: Goals to be met by: 2024     Patient will increase functional independence with mobility by performin. Supine to sit with Set-up [place]  2. Sit to supine with Set-up [place]  3. Sit to stand transfer with Supervision  4. Bed to chair transfer with Supervision using Rolling Walker  5. Gait  x 250 feet with Supervision using Rolling Walker.   6. Ascend/descend 4 stair with left Handrails Stand-by Assistance using No Assistive Device.   7. Lower extremity exercise program x15 reps per handout, with supervision                         Time Tracking:     PT Received On: 24  PT Start Time: 1047     PT Stop Time: 1113  PT Total Time (min): 26 min     Billable Minutes: Gait Training 26    Treatment Type: Treatment  PT/PTA: PT           2024

## 2024-02-08 NOTE — ASSESSMENT & PLAN NOTE
58 year old female with history of cervical CA s/p chemotherapy and XRT c/b bilateral ureteral strictures requiring bilateral nephrostomy tubes (now only with left tube; right removed 4/2023), s/p urinary to colon conduit 2020 complicated by an anastomotic leak from colo-colonic anastomosis at harvest site for the conduit, necessitating right colectomy/end ileostomy, recurrent UTIs who has been admitted for planned surgery. On 2/6 she underwent surgical repair of her left ureterocolic anastomotic stricture and ileostomy reversal with urology and CRS.  Urine culture is positive for Bacillus. She was started on Ertapenem. Afebrile. Leukocytosis resolved. HDS.    Outpatient Antibiotic Therapy Plan:    Please send referral to [facility].    1) Infection: complicated UTI    2) Discharge Antibiotics:    Intravenous antibiotics:  Ertapenem 1 g IV q 24 hours    3) Therapy Duration:  14 days    Estimated end date of IV antibiotics: 2/21/24    4) Outpatient Weekly Labs:    Order the following labs to be drawn on Mondays:   CBC  CMP     5) Fax Lab Results to Infectious Diseases Provider: Megha Zhang    [facility] Fax Number: [phone]    6) Outpatient Infectious Diseases Follow-up    Follow-up appointment will be arranged by the ID clinic and will be found in the patient's appointments tab.    Prior to discharge, please ensure the patient's follow-up has been scheduled.    If there is still no follow-up scheduled prior to discharge, please send an EPIC message to Vanessa Perez in Infectious Diseases    Discussed plan with ID staff. ID will sign off.

## 2024-02-08 NOTE — NURSING
"[facility] Plan of Care Note    Dx:   Small bowel anastomotic stricture [K91.89, K91.30]  Hydronephrosis of left kidney [N13.30]    Shift Events: NA    Goals of Care: Pain control    Neuro: WDL    Vital Signs: /62 (BP Location: Right arm, Patient Position: Lying)   Pulse 75   Temp 98.6 °F (37 °C) (Axillary)   Resp 16   Ht 5' 9" (1.753 m)   Wt 82 kg (180 lb 12.4 oz)   LMP 06/08/2014 (Approximate)   SpO2 96%   Breastfeeding No   BMI 26.70 kg/m²     Respiratory: WDL    Diet: Diet NPO Except for: Sips with Medication, Ice Chips, Medication      Is patient tolerating current diet? WDL    GTTS: IVF    Urine Output/Bowel Movement:   No intake/output data recorded.  Last Bowel Movement: 02/05/24      Drains/Tubes/Tube Feeds (include total output/shift):   No intake/output data recorded.      Lines: PIV x2      Accuchecks:NA    Skin: WDL ex incision/ drains    Fall Risk Score: 6    Activity level? Up to chair    Any scheduled procedures? NA    Any safety concerns? NA    Other: NA   "

## 2024-02-08 NOTE — PLAN OF CARE
Recommendations    1. 306 g D + 80 g AA + SMOF Lipids to provide 1860 kcal, 80 g PRO (GIR 2.59).   2. If and when medically feasible ADAT to Clear Liquid Diet --> ADAT as tolerated --> Full Liquid Diet --> Soft, Regular diet- Low Fiber restriction recommended. Texture per SLP.      3. Recommend ONS Boost Breeze while on CLD (BID), as ADAT modify to Boost Plus (or alternative) BID to optimize nutrition.      4. Monitor I/Os, weight and labs.      5. Recommend MVI + B-complex vitamin supplementation.      6. RD to monitor and follow-up.    Goals: Meet % EEN/EPN by follow-up date,  Nutrition Goal Status: new  Communication of RD Recs: other (comment) (POC)

## 2024-02-08 NOTE — ASSESSMENT & PLAN NOTE
[facility] a 59 y.[facility]. female with a history of cervical cancer s/p XRT complicated by end-stage bladder and distal ureteral strictures. She underwent urinary diversion with a transverse colon conduit in 2020 complicated by a bowel perforation resulting in an ileostomy creation for GI diversion. She is now POD s/p surgical repair of her left ureterocolic anastomotic stricture and ileostomy reversal.         - Pain - mmpc  - Diet - npo w/sips and chips.   - mIVF  - will maintain drains   - NGT w/minimal output to LIWS, may dc soon pending continued flatus/bm  - Nausea control w/ PRN antiemetics   - OOB, aim to ambulate today   - PT/OT  - Encourage IS  - DVT ppx: sqh   - daily labs  - restarting home meds as appropriate     - Dispo: continue inpatient care

## 2024-02-08 NOTE — PROCEDURES
"Edita Riley is a 59 y.o. female patient.    Temp: 98.2 °F (36.8 °C) (02/08/24 0754)  Pulse: 80 (02/08/24 0754)  Resp: 16 (02/08/24 0754)  BP: 124/60 (02/08/24 0754)  SpO2: 97 % (02/08/24 1014)  Weight: 82 kg (180 lb 12.4 oz) (02/06/24 2015)  Height: 5' 9" (175.3 cm) (02/06/24 2015)    PICC  Date/Time: 2/8/2024 11:55 PM  Performed by: Derek Mitchell RN  Assisting provider: Modesto Quintanilla RN  Consent Done: Yes  Time out: Immediately prior to procedure a time out was called to verify the correct patient, procedure, equipment, support staff and site/side marked as required  Indications: med administration  Anesthesia: local infiltration  Local anesthetic: lidocaine 1% without epinephrine  Anesthetic Total (mL): 5  Description of findings: PICC line placement  Preparation: skin prepped with ChloraPrep  Skin prep agent dried: skin prep agent completely dried prior to procedure  Sterile barriers: all five maximum sterile barriers used - cap, mask, sterile gown, sterile gloves, and large sterile sheet  Hand hygiene: hand hygiene performed prior to central venous catheter insertion  Location details: right brachial  Catheter type: double lumen  Catheter size: 5 Fr  Catheter Length: 35cm    Ultrasound guidance: yes  Vessel Caliber: large and patent, compressibility normal  Needle advanced into vessel with real time Ultrasound guidance.  Guidewire confirmed in vessel.  Image recorded and saved.  Sterile sheath used.  Number of attempts: 1  Post-procedure: blood return through all ports, chlorhexidine patch and sterile dressing applied  Technical procedures used: 3CG  Estimated blood loss (mL): 0  Specimens: No    Assessment: placement verified by x-ray  Complications: none          Name Modesto Quintanilla  2/8/2024    "

## 2024-02-08 NOTE — ANESTHESIA POSTPROCEDURE EVALUATION
Anesthesia Post Evaluation    [facility]    Procedure(s) Performed: Procedure(s) (LRB):  REVISION, ANASTOMOSIS, URETEROILEAL (N/A)  OPEN LAPAROTOMY, EXPLORATORY (N/A)  CLOSURE, ILEOSTOMY (N/A)  ANASTOMOSIS, INTESTINE - Ileocolic anastomosis (N/A)  EXCISION, SMALL INTESTINE  LYSIS, ADHESIONS  LYSIS, ADHESIONS    Final Anesthesia Type: general      Patient location during evaluation: PACU  Patient participation: Yes- Able to Participate  Level of consciousness: awake and alert  Post-procedure vital signs: reviewed and stable  Pain management: adequate  Airway patency: patent    PONV status at discharge: No PONV  Anesthetic complications: no      Cardiovascular status: blood pressure returned to baseline  Respiratory status: unassisted  Hydration status: euvolemic  Follow-up not needed.              Vitals Value Taken Time   /64 02/07/24 1954   Temp 37 °C (98.6 °F) 02/07/24 1954   Pulse 82 02/07/24 1954   Resp 18 02/07/24 1954   SpO2 99 % 02/07/24 1954         Event Time   Out of Recovery 19:00:00         Pain/Caitie Score: Pain Rating Prior to Med Admin: 3 (2/7/2024  1:59 PM)  Pain Rating Post Med Admin: 3 (2/7/2024 12:06 PM)  Caitie Score: 9 (2/6/2024  7:00 PM)

## 2024-02-08 NOTE — PROGRESS NOTES
[facility]  Infectious Disease  Progress Note    Patient Name: Edita Riley  MRN: [id number]  Admission Date: 2/6/2024  Length of Stay: 2 days  Attending Physician: Leslie Balbuena MD  Primary Care Provider: Trina Vu MD    Isolation Status: No active isolations  Assessment/Plan:      Renal/  UTI (urinary tract infection)    58 year old female with history of cervical CA s/p chemotherapy and XRT c/b bilateral ureteral strictures requiring bilateral nephrostomy tubes (now only with left tube; right removed 4/2023), s/p urinary to colon conduit 2020 complicated by an anastomotic leak from colo-colonic anastomosis at harvest site for the conduit, necessitating right colectomy/end ileostomy, recurrent UTIs who has been admitted for planned surgery. On 2/6 she underwent surgical repair of her left ureterocolic anastomotic stricture and ileostomy reversal with urology and CRS.  Urine culture is positive for Bacillus. She was started on Ertapenem. Afebrile. Leukocytosis resolved. HDS.    Outpatient Antibiotic Therapy Plan:    Please send referral to [facility].    1) Infection: complicated UTI    2) Discharge Antibiotics:    Intravenous antibiotics:  Ertapenem 1 g IV q 24 hours    3) Therapy Duration:  14 days    Estimated end date of IV antibiotics: 2/21/24    4) Outpatient Weekly Labs:    Order the following labs to be drawn on Mondays:   CBC  CMP     5) Fax Lab Results to Infectious Diseases Provider: Megha Zhang    [facility] Fax Number: [phone]    6) Outpatient Infectious Diseases Follow-up    Follow-up appointment will be arranged by the ID clinic and will be found in the patient's appointments tab.    Prior to discharge, please ensure the patient's follow-up has been scheduled.    If there is still no follow-up scheduled prior to discharge, please send an EPIC message to Vanessa Perez in Infectious Diseases    Discussed plan with ID  staff. ID will sign off.        Thank you for the consult. Please secure chat for any questions.  Megha Zhang PA-C    Subjective:     Principal Problem:Hydronephrosis of left kidney    HPI: Edita Riley is a 58 year old female with history of cervical CA s/p chemotherapy and XRT c/b bilateral ureteral strictures requiring bilateral nephrostomy tubes (now only with left tube; right removed 4/2023), s/p urinary to colon conduit 2020 complicated by an anastomotic leak from colo-colonic anastomosis at harvest site for the conduit, necessitating right colectomy/end ileostomy, recurrent UTIs who has been admitted for planned surgery. She was scheduled for revision of her colonic conduit-ureteral anastomosis in November with a planned attempt at ileostomy takedown and ileocolic anastomosis at the same time, unfortunately that was delayed due to recurrent hospital admissions.     On 2/6, she underwent ileostomy takedown, ileosigmoid anastomosis,  lysis of adhesion, revision of left ureterocolic anastomosis/ left ureteral reimplant into transverse colon conduit  with urology and CRS. Afebrile post op. WBC 14K.  Urine culture is positive for Bacillus. ID consulted for recommendations.    Interval History: Patient started on Ertapenem yesterday. Afebrile. Leukocytosis resolved. CRS and urology following. Passing gas. Urine cx with bacillus.    Review of Systems   Constitutional:  Positive for appetite change and fatigue. Negative for chills, diaphoresis and fever.   Respiratory:  Negative for cough, chest tightness and shortness of breath.    Cardiovascular:  Negative for chest pain and leg swelling.   Gastrointestinal:  Positive for abdominal pain. Negative for nausea.   Musculoskeletal:  Negative for arthralgias, back pain and joint swelling.   Skin:  Positive for wound. Negative for color change and rash.   Neurological:  Negative for headaches.   Psychiatric/Behavioral:  Negative for agitation and confusion.  The patient is not nervous/anxious.      Objective:     Vital Signs (Most Recent):  Temp: 98.2 °F (36.8 °C) (02/08/24 0754)  Pulse: 80 (02/08/24 0754)  Resp: 16 (02/08/24 1226)  BP: 124/60 (02/08/24 0754)  SpO2: 97 % (02/08/24 1014) Vital Signs (24h Range):  Temp:  [97.4 °F (36.3 °C)-98.6 °F (37 °C)] 98.2 °F (36.8 °C)  Pulse:  [73-82] 80  Resp:  [12-18] 16  SpO2:  [96 %-99 %] 97 %  BP: (124-151)/(60-75) 124/60     Weight: 82 kg (180 lb 12.4 oz)  Body mass index is 26.7 kg/m².    Estimated Creatinine Clearance: 61.5 mL/min (based on SCr of 1.1 mg/dL).     Physical Exam  Vitals and nursing note reviewed.   Constitutional:       General: She is not in acute distress.     Appearance: Normal appearance. She is well-developed. She is not diaphoretic.   HENT:      Head: Normocephalic and atraumatic.      Right Ear: External ear normal.      Left Ear: External ear normal.      Nose: Nose normal.      Comments: NG tube  Eyes:      General: No scleral icterus.        Right eye: No discharge.         Left eye: No discharge.      Extraocular Movements: Extraocular movements intact.      Conjunctiva/sclera: Conjunctivae normal.   Cardiovascular:      Rate and Rhythm: Normal rate and regular rhythm.   Pulmonary:      Effort: Pulmonary effort is normal. No respiratory distress.      Breath sounds: No stridor.   Abdominal:      Tenderness: There is abdominal tenderness.      Comments: Incision CDI; Urostomy  ORESETS drain with SS output      Genitourinary:     Comments: Left nephrostomy tube  Musculoskeletal:         General: Normal range of motion.   Skin:     General: Skin is warm and dry.      Findings: No erythema or rash.   Neurological:      General: No focal deficit present.      Mental Status: She is alert and oriented to person, place, and time. Mental status is at baseline.      Cranial Nerves: No cranial nerve deficit.   Psychiatric:         Mood and Affect: Mood normal.         Behavior: Behavior normal.         Thought  Content: Thought content normal.         Judgment: Judgment normal.          Significant Labs: Blood Culture:   Recent Labs   Lab 08/18/23 2043 09/09/23 1806 09/09/23 1807 12/07/23 1953 12/08/23  0155   LABBLOO No growth after 5 days.  No growth after 5 days. No growth after 5 days. No growth after 5 days. No growth after 5 days.  No growth after 5 days. No growth after 5 days.  No growth after 5 days.     CBC:   Recent Labs   Lab 02/06/24 1836 02/07/24 0242 02/08/24 0657   WBC 16.20* 14.87* 11.77   HGB 13.0 12.5 9.9*   HCT 42.0 40.1 31.6*    297 267     CMP:   Recent Labs   Lab 02/06/24 1836 02/07/24 0242 02/08/24 0657    141 145   K 4.1 4.1 3.2*   * 117* 114*   CO2 12* 15* 24   * 129* 89   BUN 11 13 13   CREATININE 1.4 1.4 1.1   CALCIUM 7.4* 8.3* 8.5*   PROT 5.6*  --   --    ALBUMIN 2.3*  --   --    BILITOT 0.4  --   --    ALKPHOS 93  --   --    AST 28  --   --    ALT 22  --   --    ANIONGAP 11 9 7*     Microbiology Results (last 7 days)       Procedure Component Value Units Date/Time    Urine culture [[id number]]  (Abnormal) Collected: 02/06/24 0700    Order Status: Completed Specimen: Urine from Kidney, Left Updated: 02/07/24 0837     Urine Culture, Routine BACILLUS SPECIES  50,000 - 99,999 cfu/ml      Narrative:      Indicated criteria for high risk culture:->Prior to urologic  procedures          Recent Lab Results         02/08/24 0657        Anion Gap 7       Baso # 0.05       Basophil % 0.4       BUN 13       Calcium 8.5       Chloride 114       CO2 24       Creatinine 1.1       Differential Method Automated       eGFR 57.2       Eos # 0.3       Eos % 2.8       Glucose 89       Gran # (ANC) 8.2       Gran % 70.1       Hematocrit 31.6       Hemoglobin 9.9       Immature Grans (Abs) 0.04  Comment: Mild elevation in immature granulocytes is non specific and   can be seen in a variety of conditions including stress response,   acute inflammation, trauma and pregnancy.  Correlation with other   laboratory and clinical findings is essential.         Immature Granulocytes 0.3       Lymph # 1.7       Lymph % 14.6       Magnesium  2.0       MCH 28.5       MCHC 31.3       MCV 91       Mono # 1.4       Mono % 11.8       MPV 10.4       nRBC 0       Phosphorus Level 2.1       Platelet Count 267       Potassium 3.2       RBC 3.47       RDW 15.9       Sodium 145       WBC 11.77               Significant Imaging:       X-Ray Chest 1 View S/P PICC Line by Nursing [[id number]] Resulted: 02/08/24 1317   Order Status: Completed Updated: 02/08/24 1319   Narrative:     EXAMINATION:  XR CHEST 1 VIEW S/P PICC LINE BY NURSING    CLINICAL HISTORY:  S/p PICC placement;    TECHNIQUE:  Frontal view    COMPARISON:  Chest radiograph 08/18/2023, CT abdomen and pelvis 12/09/2023    FINDINGS:  Interval placement right-sided PICC with tip overlying the proximal SVC.  Enteric tube tip below the diaphragm out of field of view.  No pneumothorax or new focal opacity.  Bibasilar minimal platelike scarring versus atelectasis.  The lungs are otherwise well expanded and clear elsewhere.  Cardiomediastinal silhouette is midline and within normal limits.  No acute osseous process seen.  PA and lateral views can be obtained.   Impression:       Interval right-sided PICC overlying the SVC without acute complication.      Electronically signed by: Hammad Swartz MD  Date: 02/08/2024  Time: 13:17   XR Gastric tube check, non-radiologist performed [[id number]] Resulted: 02/07/24 1150   Order Status: Completed Updated: 02/07/24 1153   Narrative:     EXAMINATION:  XR GASTRIC TUBE CHECK, NON-RADIOLOGIST PERFORMED    CLINICAL HISTORY:  NG tube confirmation;    TECHNIQUE:  Abdomen one view    COMPARISON:  N 02/06/2024 one    FINDINGS:  NG tube in the stomach similar to the previous study.  No significant bowel dilatation.  Postsurgical changes as before   Impression:       See above      Electronically signed by: Gokul Hardy  MD  Date: 02/07/2024  Time: 11:50   X-Ray Abdomen AP 1 View [[id number]] Resulted: 02/07/24 0857   Order Status: Completed Updated: 02/07/24 0859   Narrative:     EXAMINATION:  XR ABDOMEN AP 1 VIEW    CLINICAL HISTORY:  confirm left ureteral stent placement;    TECHNIQUE:  Single AP View of the abdomen was performed.    COMPARISON:  11/04/2023    FINDINGS:  NG/OG tube terminates in the left upper quadrant.  Surgical clips and skin staples noted.  Several drainage catheters are noted over the left abdomen, including left nephrostomy.  Bowel gas pattern is nonobstructive.  No evidence for gross pneumoperitoneum.  Regional osseous structures are unremarkable.   Impression:       As above.      Electronically signed by: Ed Marcial MD  Date: 02/07/2024  Time: 08:57

## 2024-02-08 NOTE — CONSULTS
"  [facility]  Adult Nutrition  Consult Note    SUMMARY     Recommendations    1. 306 g D + 80 g AA + SMOF Lipids to provide 1860 kcal, 80 g PRO (GIR 2.59).   2. If and when medically feasible ADAT to Clear Liquid Diet --> ADAT as tolerated --> Full Liquid Diet --> Soft, Regular diet- Low Fiber restriction recommended. Texture per SLP.      3. Recommend ONS Boost Breeze while on CLD (BID), as ADAT modify to Boost Plus (or alternative) BID to optimize nutrition.      4. Monitor I/Os, weight and labs.      5. Recommend MVI + B-complex vitamin supplementation.      6. RD to monitor and follow-up.    Goals: Meet % EEN/EPN by follow-up date,  Nutrition Goal Status: new  Communication of RD Recs: other (comment) (POC)    Assessment and Plan    Nutrition Problem  Inadequate oral intake    Related to (etiology):   Decreased ability to consume sufficient energy    Signs and Symptoms (as evidenced by):   NPO and new TPN     Interventions/Recommendations (treatment strategy):  Collaboration of nutrition care with other providers  NPO  TPN  ADAT    Nutrition Diagnosis Status:   New       Malnutrition Assessment                                       Reason for Assessment    Reason For Assessment: consult  Diagnosis: other (see comments) (Hydronephrosis of left kidney)  Relevant Medical History: LLE DVT, cervical CA, HTN, stroke, seizures  Interdisciplinary Rounds: did not attend  General Information Comments: RD consulted for TPN rec's provided below. UBW ~180-85lbs.  Nutrition Discharge Planning: Pending Clinical course    Nutrition Risk Screen    Nutrition Risk Screen: no indicators present    Nutrition/Diet History    Patient Reported Diet/Restrictions/Preferences: general  Spiritual, Cultural Beliefs, Alevism Practices, Values that Affect Care: no  Factors Affecting Nutritional Intake: None identified at this time    Anthropometrics    Temp: 98.2 °F (36.8 °C)  Height Method: Stated  Height: 5' 9" (175.3 " cm)  Height (inches): 69 in  Weight Method: Infant Scale  Weight: 82 kg (180 lb 12.4 oz)  Weight (lb): 180.78 lb  Ideal Body Weight (IBW), Female: 145 lb  % Ideal Body Weight, Female (lb): 124.68 %  BMI (Calculated): 26.7       Lab/Procedures/Meds    Pertinent Labs Reviewed: reviewed  Pertinent Labs Comments: H/H: 9.9/31.6, MCHC 31.3, K 3.2, Cl 114, GFR 57.2, Ca 8.5, P 2.1  Pertinent Medications Reviewed: reviewed  Pertinent Medications Comments: ertapenem, cetirizine, heparin, mirtazapine, pregabalin, sodium bicarb    Estimated/Assessed Needs    Weight Used For Calorie Calculations: 81.6 kg (180 lb)  Energy Calorie Requirements (kcal): 7532-2272 kcal/d (MSJ x 1.25-1.3 AF)  Energy Need Method: Ashe-St Jeor  Protein Requirements: 65-82 g/d (0.8-1.0 g/kg)  Weight Used For Protein Calculations: 81.6 kg (180 lb)        RDA Method (mL): 1807         Nutrition Prescription Ordered    Current Diet Order: NPO    Evaluation of Received Nutrient/Fluid Intake    I/O: -696.3 mL  Comments: LBM: 2/5  % Intake of Estimated Energy Needs: 75 - 100 %  % Meal Intake: NPO    Nutrition Risk    Level of Risk/Frequency of Follow-up: moderate (1-2x/ week)       Monitor and Evaluation    Food and Nutrient Intake: energy intake, food and beverage intake, enteral nutrition intake, parenteral nutrition intake  Food and Nutrient Adminstration: diet order, enteral and parenteral nutrition administration  Physical Activity and Function: nutrition-related ADLs and IADLs, factors affecting access to physical activity  Anthropometric Measurements: weight, weight change, body mass index  Biochemical Data, Medical Tests and Procedures: electrolyte and renal panel, gastrointestinal profile, glucose/endocrine profile, inflammatory profile, lipid profile  Nutrition-Focused Physical Findings: overall appearance, extremities, muscles and bones, head and eyes, skin       Nutrition Follow-Up    RD Follow-up?: Yes    Lawrence Nunez Registration Eligible,  Provisional LDN

## 2024-02-08 NOTE — SUBJECTIVE & OBJECTIVE
Interval History: Patient started on Ertapenem yesterday. Afebrile. Leukocytosis resolved. CRS and urology following. Passing gas. Urine cx with bacillus.    Review of Systems   Constitutional:  Positive for appetite change and fatigue. Negative for chills, diaphoresis and fever.   Respiratory:  Negative for cough, chest tightness and shortness of breath.    Cardiovascular:  Negative for chest pain and leg swelling.   Gastrointestinal:  Positive for abdominal pain. Negative for nausea.   Musculoskeletal:  Negative for arthralgias, back pain and joint swelling.   Skin:  Positive for wound. Negative for color change and rash.   Neurological:  Negative for headaches.   Psychiatric/Behavioral:  Negative for agitation and confusion. The patient is not nervous/anxious.      Objective:     Vital Signs (Most Recent):  Temp: 98.2 °F (36.8 °C) (02/08/24 0754)  Pulse: 80 (02/08/24 0754)  Resp: 16 (02/08/24 1226)  BP: 124/60 (02/08/24 0754)  SpO2: 97 % (02/08/24 1014) Vital Signs (24h Range):  Temp:  [97.4 °F (36.3 °C)-98.6 °F (37 °C)] 98.2 °F (36.8 °C)  Pulse:  [73-82] 80  Resp:  [12-18] 16  SpO2:  [96 %-99 %] 97 %  BP: (124-151)/(60-75) 124/60     Weight: 82 kg (180 lb 12.4 oz)  Body mass index is 26.7 kg/m².    Estimated Creatinine Clearance: 61.5 mL/min (based on SCr of 1.1 mg/dL).     Physical Exam  Vitals and nursing note reviewed.   Constitutional:       General: She is not in acute distress.     Appearance: Normal appearance. She is well-developed. She is not diaphoretic.   HENT:      Head: Normocephalic and atraumatic.      Right Ear: External ear normal.      Left Ear: External ear normal.      Nose: Nose normal.      Comments: NG tube  Eyes:      General: No scleral icterus.        Right eye: No discharge.         Left eye: No discharge.      Extraocular Movements: Extraocular movements intact.      Conjunctiva/sclera: Conjunctivae normal.   Cardiovascular:      Rate and Rhythm: Normal rate and regular rhythm.    Pulmonary:      Effort: Pulmonary effort is normal. No respiratory distress.      Breath sounds: No stridor.   Abdominal:      Tenderness: There is abdominal tenderness.      Comments: Incision CDI; Urostomy  ORESTES drain with SS output      Genitourinary:     Comments: Left nephrostomy tube  Musculoskeletal:         General: Normal range of motion.   Skin:     General: Skin is warm and dry.      Findings: No erythema or rash.   Neurological:      General: No focal deficit present.      Mental Status: She is alert and oriented to person, place, and time. Mental status is at baseline.      Cranial Nerves: No cranial nerve deficit.   Psychiatric:         Mood and Affect: Mood normal.         Behavior: Behavior normal.         Thought Content: Thought content normal.         Judgment: Judgment normal.          Significant Labs: Blood Culture:   Recent Labs   Lab 08/18/23  2043 09/09/23 1806 09/09/23 1807 12/07/23 1953 12/08/23  0155   LABBLOO No growth after 5 days.  No growth after 5 days. No growth after 5 days. No growth after 5 days. No growth after 5 days.  No growth after 5 days. No growth after 5 days.  No growth after 5 days.     CBC:   Recent Labs   Lab 02/06/24  1836 02/07/24  0242 02/08/24  0657   WBC 16.20* 14.87* 11.77   HGB 13.0 12.5 9.9*   HCT 42.0 40.1 31.6*    297 267     CMP:   Recent Labs   Lab 02/06/24  1836 02/07/24  0242 02/08/24  0657    141 145   K 4.1 4.1 3.2*   * 117* 114*   CO2 12* 15* 24   * 129* 89   BUN 11 13 13   CREATININE 1.4 1.4 1.1   CALCIUM 7.4* 8.3* 8.5*   PROT 5.6*  --   --    ALBUMIN 2.3*  --   --    BILITOT 0.4  --   --    ALKPHOS 93  --   --    AST 28  --   --    ALT 22  --   --    ANIONGAP 11 9 7*     Microbiology Results (last 7 days)       Procedure Component Value Units Date/Time    Urine culture [[id number]]  (Abnormal) Collected: 02/06/24 0700    Order Status: Completed Specimen: Urine from Kidney, Left Updated: 02/07/24 0837     Urine  Culture, Routine BACILLUS SPECIES  50,000 - 99,999 cfu/ml      Narrative:      Indicated criteria for high risk culture:->Prior to urologic  procedures          Recent Lab Results         02/08/24  0657        Anion Gap 7       Baso # 0.05       Basophil % 0.4       BUN 13       Calcium 8.5       Chloride 114       CO2 24       Creatinine 1.1       Differential Method Automated       eGFR 57.2       Eos # 0.3       Eos % 2.8       Glucose 89       Gran # (ANC) 8.2       Gran % 70.1       Hematocrit 31.6       Hemoglobin 9.9       Immature Grans (Abs) 0.04  Comment: Mild elevation in immature granulocytes is non specific and   can be seen in a variety of conditions including stress response,   acute inflammation, trauma and pregnancy. Correlation with other   laboratory and clinical findings is essential.         Immature Granulocytes 0.3       Lymph # 1.7       Lymph % 14.6       Magnesium  2.0       MCH 28.5       MCHC 31.3       MCV 91       Mono # 1.4       Mono % 11.8       MPV 10.4       nRBC 0       Phosphorus Level 2.1       Platelet Count 267       Potassium 3.2       RBC 3.47       RDW 15.9       Sodium 145       WBC 11.77               Significant Imaging:       X-Ray Chest 1 View S/P PICC Line by Nursing [[id number]] Resulted: 02/08/24 1317   Order Status: Completed Updated: 02/08/24 1319   Narrative:     EXAMINATION:  XR CHEST 1 VIEW S/P PICC LINE BY NURSING    CLINICAL HISTORY:  S/p PICC placement;    TECHNIQUE:  Frontal view    COMPARISON:  Chest radiograph 08/18/2023, CT abdomen and pelvis 12/09/2023    FINDINGS:  Interval placement right-sided PICC with tip overlying the proximal SVC.  Enteric tube tip below the diaphragm out of field of view.  No pneumothorax or new focal opacity.  Bibasilar minimal platelike scarring versus atelectasis.  The lungs are otherwise well expanded and clear elsewhere.  Cardiomediastinal silhouette is midline and within normal limits.  No acute osseous process seen.  PA  and lateral views can be obtained.   Impression:       Interval right-sided PICC overlying the SVC without acute complication.      Electronically signed by: Hammad Swartz MD  Date: 02/08/2024  Time: 13:17   XR Gastric tube check, non-radiologist performed [[id number]] Resulted: 02/07/24 1150   Order Status: Completed Updated: 02/07/24 1153   Narrative:     EXAMINATION:  XR GASTRIC TUBE CHECK, NON-RADIOLOGIST PERFORMED    CLINICAL HISTORY:  NG tube confirmation;    TECHNIQUE:  Abdomen one view    COMPARISON:  N 02/06/2024 one    FINDINGS:  NG tube in the stomach similar to the previous study.  No significant bowel dilatation.  Postsurgical changes as before   Impression:       See above      Electronically signed by: Gokul Hardy MD  Date: 02/07/2024  Time: 11:50   X-Ray Abdomen AP 1 View [[id number]] Resulted: 02/07/24 0857   Order Status: Completed Updated: 02/07/24 0859   Narrative:     EXAMINATION:  XR ABDOMEN AP 1 VIEW    CLINICAL HISTORY:  confirm left ureteral stent placement;    TECHNIQUE:  Single AP View of the abdomen was performed.    COMPARISON:  11/04/2023    FINDINGS:  NG/OG tube terminates in the left upper quadrant.  Surgical clips and skin staples noted.  Several drainage catheters are noted over the left abdomen, including left nephrostomy.  Bowel gas pattern is nonobstructive.  No evidence for gross pneumoperitoneum.  Regional osseous structures are unremarkable.   Impression:       As above.      Electronically signed by: Ed Marcial MD  Date: 02/07/2024  Time: 08:57

## 2024-02-08 NOTE — CONSULTS
Double lumen PICC to Right Brachial vein.  35 cm in length, 36 cm arm circumference and 0 cm exposed.   Lot # [id number].

## 2024-02-08 NOTE — PROGRESS NOTES
[facility]  Urology  Progress Note    Patient Name: Edita Riley  MRN: [id number]  Admission Date: 2/6/2024  Hospital Length of Stay: 2 days  Code Status: Full Code   Attending Provider: Leslie Balbuena MD   Primary Care Physician: Trina Vu MD    Subjective:     HPI:  No notes on file    Interval History: NAOE, AFVSS, says she has passed flatus, unsure if she had bowel movement, has ambulated    /250  Urostomy 100/350  ORESTES 195/30  /500      Objective:     Temp:  [97.4 °F (36.3 °C)-99.4 °F (37.4 °C)] 98.2 °F (36.8 °C)  Pulse:  [70-82] 73  Resp:  [12-20] 18  SpO2:  [96 %-100 %] 98 %  BP: (110-151)/(62-75) 151/75     Body mass index is 26.7 kg/m².           Drains       Drain  Duration                  Suprapubic Catheter 100% silicone 16 Fr. -- days         Nephrostomy 02/02/24 1255 Left 12 Fr. 5 days         Closed/Suction Drain 02/06/24 Tube - 1 Midline Abdomen Bulb 15 Fr. 2 days         NG/OG Tube 02/06/24 0748 nasogastric 1 day         Urostomy 02/06/24 1815 ureterostomy, left 1 day         NG/OG Tube 02/07/24 1036 nasogastric 16 Fr. Left nostril <1 day                     Physical Exam  Constitutional:       General: She is not in acute distress.     Appearance: Normal appearance.   HENT:      Head: Normocephalic and atraumatic.      Nose: Nose normal.      Comments: NGT in place to LIWS, minimal green output  Eyes:      Conjunctiva/sclera: Conjunctivae normal.   Cardiovascular:      Rate and Rhythm: Normal rate.   Pulmonary:      Effort: Pulmonary effort is normal. No respiratory distress.   Abdominal:      General: There is no distension.      Palpations: Abdomen is soft.      Comments: Incision CDI  Abdomen appropriately ttp   Urostomy in place, stent appears appropriately placed, urostomy with CYU output, no surrounding erythema/signs of dehiscence/signs of infection  Prior ileostomy site with packing in place, no significant discharge or purulence  L NT in place  with cyu  ORESTES in place w/ss output    Musculoskeletal:         General: No deformity.   Skin:     General: Skin is warm and dry.   Neurological:      General: No focal deficit present.      Mental Status: She is alert.   Psychiatric:         Mood and Affect: Mood normal.         Behavior: Behavior normal.           Significant Labs:    BMP:  Recent Labs   Lab 02/06/24  0551 02/06/24  1836 02/07/24  0242    141 141   K 3.8 4.1 4.1   * 118* 117*   CO2 15* 12* 15*   BUN 13 11 13   CREATININE 1.4 1.4 1.4   CALCIUM 9.8 7.4* 8.3*       CBC:   Recent Labs   Lab 02/06/24  1836 02/07/24  0242   WBC 16.20* 14.87*   HGB 13.0 12.5   HCT 42.0 40.1    297       All pertinent labs results from the past 24 hours have been reviewed.    Significant Imaging:  All pertinent imaging results/findings from the past 24 hours have been reviewed.                  Assessment/Plan:     Ileostomy in place  Edita Riley is a 59 y.o. female with a history of cervical cancer s/p XRT complicated by end-stage bladder and distal ureteral strictures. She underwent urinary diversion with a transverse colon conduit in 2020 complicated by a bowel perforation resulting in an ileostomy creation for GI diversion. She is now POD s/p surgical repair of her left ureterocolic anastomotic stricture and ileostomy reversal.         - Pain - mmpc  - Diet - npo w/sips and chips.   - mIVF  - will maintain drains  - Clamp neph tube today   - PICC consult today for TPN  - NGT w/minimal output to LIWS, may dc soon pending continued flatus/bm, mgmt per crs  - Nausea control w/ PRN antiemetics   - OOB, aim to ambulate today   - PT/OT  - Encourage IS  - DVT ppx: sqh   - daily labs  - restarting home meds as appropriate     - Dispo: continue inpatient care           VTE Risk Mitigation (From admission, onward)           Ordered     heparin (porcine) injection 5,000 Units  Every 8 hours         02/06/24 1725     Place ANANT hose  Until  discontinued         02/06/24 0526     Place sequential compression device  Until discontinued         02/06/24 0526                    Marlo Gabriel MD  Urology  [facility]

## 2024-02-09 LAB
ANION GAP SERPL CALC-SCNC: 6 MMOL/L (ref 8–16)
BASOPHILS # BLD AUTO: 0.03 K/UL (ref 0–0.2)
BASOPHILS NFR BLD: 0.3 % (ref 0–1.9)
BUN SERPL-MCNC: 12 MG/DL (ref 8–23)
CALCIUM SERPL-MCNC: 8.6 MG/DL (ref 8.7–10.5)
CHLORIDE SERPL-SCNC: 109 MMOL/L (ref 95–110)
CO2 SERPL-SCNC: 31 MMOL/L (ref 23–29)
CREAT SERPL-MCNC: 1.2 MG/DL (ref 0.5–1.4)
CRP SERPL-MCNC: 114.4 MG/L (ref 0–8.2)
DIFFERENTIAL METHOD BLD: ABNORMAL
EOSINOPHIL # BLD AUTO: 0.4 K/UL (ref 0–0.5)
EOSINOPHIL NFR BLD: 4.1 % (ref 0–8)
ERYTHROCYTE [DISTWIDTH] IN BLOOD BY AUTOMATED COUNT: 15.9 % (ref 11.5–14.5)
EST. GFR  (NO RACE VARIABLE): 51.5 ML/MIN/1.73 M^2
GLUCOSE SERPL-MCNC: 101 MG/DL (ref 70–110)
HCT VFR BLD AUTO: 32.3 % (ref 37–48.5)
HGB BLD-MCNC: 9.7 G/DL (ref 12–16)
IMM GRANULOCYTES # BLD AUTO: 0.07 K/UL (ref 0–0.04)
IMM GRANULOCYTES NFR BLD AUTO: 0.8 % (ref 0–0.5)
LYMPHOCYTES # BLD AUTO: 1.1 K/UL (ref 1–4.8)
LYMPHOCYTES NFR BLD: 13.1 % (ref 18–48)
MAGNESIUM SERPL-MCNC: 1.9 MG/DL (ref 1.6–2.6)
MCH RBC QN AUTO: 28.3 PG (ref 27–31)
MCHC RBC AUTO-ENTMCNC: 30 G/DL (ref 32–36)
MCV RBC AUTO: 94 FL (ref 82–98)
MONOCYTES # BLD AUTO: 0.9 K/UL (ref 0.3–1)
MONOCYTES NFR BLD: 10.1 % (ref 4–15)
NEUTROPHILS # BLD AUTO: 6.2 K/UL (ref 1.8–7.7)
NEUTROPHILS NFR BLD: 71.6 % (ref 38–73)
NRBC BLD-RTO: 0 /100 WBC
PHOSPHATE SERPL-MCNC: 2.4 MG/DL (ref 2.7–4.5)
PLATELET # BLD AUTO: 203 K/UL (ref 150–450)
PMV BLD AUTO: 10.3 FL (ref 9.2–12.9)
POTASSIUM SERPL-SCNC: 2.8 MMOL/L (ref 3.5–5.1)
RBC # BLD AUTO: 3.43 M/UL (ref 4–5.4)
SODIUM SERPL-SCNC: 146 MMOL/L (ref 136–145)
TRIGL SERPL-MCNC: 232 MG/DL (ref 30–150)
WBC # BLD AUTO: 8.7 K/UL (ref 3.9–12.7)

## 2024-02-09 PROCEDURE — 25000003 PHARM REV CODE 250

## 2024-02-09 PROCEDURE — 36415 COLL VENOUS BLD VENIPUNCTURE: CPT | Performed by: STUDENT IN AN ORGANIZED HEALTH CARE EDUCATION/TRAINING PROGRAM

## 2024-02-09 PROCEDURE — 25000003 PHARM REV CODE 250: Performed by: PHYSICIAN ASSISTANT

## 2024-02-09 PROCEDURE — 97535 SELF CARE MNGMENT TRAINING: CPT | Mod: CO

## 2024-02-09 PROCEDURE — 25000003 PHARM REV CODE 250: Performed by: STUDENT IN AN ORGANIZED HEALTH CARE EDUCATION/TRAINING PROGRAM

## 2024-02-09 PROCEDURE — 84100 ASSAY OF PHOSPHORUS: CPT | Performed by: STUDENT IN AN ORGANIZED HEALTH CARE EDUCATION/TRAINING PROGRAM

## 2024-02-09 PROCEDURE — 97530 THERAPEUTIC ACTIVITIES: CPT | Mod: CO

## 2024-02-09 PROCEDURE — A4216 STERILE WATER/SALINE, 10 ML: HCPCS | Performed by: UROLOGY

## 2024-02-09 PROCEDURE — 84478 ASSAY OF TRIGLYCERIDES: CPT

## 2024-02-09 PROCEDURE — 63600175 PHARM REV CODE 636 W HCPCS

## 2024-02-09 PROCEDURE — 63600175 PHARM REV CODE 636 W HCPCS: Performed by: STUDENT IN AN ORGANIZED HEALTH CARE EDUCATION/TRAINING PROGRAM

## 2024-02-09 PROCEDURE — 63600175 PHARM REV CODE 636 W HCPCS: Mod: JZ,JG

## 2024-02-09 PROCEDURE — 85025 COMPLETE CBC W/AUTO DIFF WBC: CPT | Performed by: STUDENT IN AN ORGANIZED HEALTH CARE EDUCATION/TRAINING PROGRAM

## 2024-02-09 PROCEDURE — 86140 C-REACTIVE PROTEIN: CPT | Performed by: STUDENT IN AN ORGANIZED HEALTH CARE EDUCATION/TRAINING PROGRAM

## 2024-02-09 PROCEDURE — B4185 PARENTERAL SOL 10 GM LIPIDS: HCPCS

## 2024-02-09 PROCEDURE — 25000003 PHARM REV CODE 250: Performed by: UROLOGY

## 2024-02-09 PROCEDURE — 20600001 HC STEP DOWN PRIVATE ROOM

## 2024-02-09 PROCEDURE — 80048 BASIC METABOLIC PNL TOTAL CA: CPT | Performed by: STUDENT IN AN ORGANIZED HEALTH CARE EDUCATION/TRAINING PROGRAM

## 2024-02-09 PROCEDURE — 63600175 PHARM REV CODE 636 W HCPCS: Performed by: PHYSICIAN ASSISTANT

## 2024-02-09 PROCEDURE — 83735 ASSAY OF MAGNESIUM: CPT | Performed by: STUDENT IN AN ORGANIZED HEALTH CARE EDUCATION/TRAINING PROGRAM

## 2024-02-09 PROCEDURE — A4217 STERILE WATER/SALINE, 500 ML: HCPCS

## 2024-02-09 RX ORDER — SODIUM,POTASSIUM PHOSPHATES 280-250MG
2 POWDER IN PACKET (EA) ORAL EVERY 4 HOURS
Status: DISCONTINUED | OUTPATIENT
Start: 2024-02-09 | End: 2024-02-09

## 2024-02-09 RX ORDER — POTASSIUM CHLORIDE 20 MEQ/1
40 TABLET, EXTENDED RELEASE ORAL ONCE
Status: COMPLETED | OUTPATIENT
Start: 2024-02-09 | End: 2024-02-09

## 2024-02-09 RX ADMIN — METHOCARBAMOL 500 MG: 500 TABLET ORAL at 02:02

## 2024-02-09 RX ADMIN — METHOCARBAMOL 500 MG: 500 TABLET ORAL at 09:02

## 2024-02-09 RX ADMIN — HYDROMORPHONE HYDROCHLORIDE 1 MG: 1 INJECTION, SOLUTION INTRAMUSCULAR; INTRAVENOUS; SUBCUTANEOUS at 09:02

## 2024-02-09 RX ADMIN — SODIUM BICARBONATE 650 MG TABLET 1300 MG: at 09:02

## 2024-02-09 RX ADMIN — ACETAMINOPHEN 1000 MG: 500 TABLET ORAL at 02:02

## 2024-02-09 RX ADMIN — HEPARIN SODIUM 5000 UNITS: 5000 INJECTION INTRAVENOUS; SUBCUTANEOUS at 02:02

## 2024-02-09 RX ADMIN — POTASSIUM PHOSPHATE, MONOBASIC AND POTASSIUM PHOSPHATE, DIBASIC 15 MMOL: 224; 236 INJECTION, SOLUTION, CONCENTRATE INTRAVENOUS at 11:02

## 2024-02-09 RX ADMIN — PREGABALIN 150 MG: 150 CAPSULE ORAL at 09:02

## 2024-02-09 RX ADMIN — OXYCODONE HYDROCHLORIDE 10 MG: 10 TABLET ORAL at 05:02

## 2024-02-09 RX ADMIN — HEPARIN SODIUM 5000 UNITS: 5000 INJECTION INTRAVENOUS; SUBCUTANEOUS at 09:02

## 2024-02-09 RX ADMIN — ERTAPENEM 1 G: 1 INJECTION INTRAMUSCULAR; INTRAVENOUS at 02:02

## 2024-02-09 RX ADMIN — MIRTAZAPINE 30 MG: 15 TABLET, FILM COATED ORAL at 09:02

## 2024-02-09 RX ADMIN — OXYCODONE HYDROCHLORIDE 10 MG: 10 TABLET ORAL at 09:02

## 2024-02-09 RX ADMIN — CETIRIZINE HYDROCHLORIDE 10 MG: 10 TABLET, FILM COATED ORAL at 09:02

## 2024-02-09 RX ADMIN — METHOCARBAMOL 500 MG: 500 TABLET ORAL at 05:02

## 2024-02-09 RX ADMIN — Medication 10 ML: at 05:02

## 2024-02-09 RX ADMIN — POTASSIUM CHLORIDE 40 MEQ: 1500 TABLET, EXTENDED RELEASE ORAL at 09:02

## 2024-02-09 RX ADMIN — SODIUM CHLORIDE, POTASSIUM CHLORIDE, SODIUM LACTATE AND CALCIUM CHLORIDE: 600; 310; 30; 20 INJECTION, SOLUTION INTRAVENOUS at 12:02

## 2024-02-09 RX ADMIN — Medication 10 ML: at 12:02

## 2024-02-09 RX ADMIN — HYDROMORPHONE HYDROCHLORIDE 1 MG: 1 INJECTION, SOLUTION INTRAMUSCULAR; INTRAVENOUS; SUBCUTANEOUS at 11:02

## 2024-02-09 RX ADMIN — ACETAMINOPHEN 1000 MG: 500 TABLET ORAL at 05:02

## 2024-02-09 RX ADMIN — HEPARIN SODIUM 5000 UNITS: 5000 INJECTION INTRAVENOUS; SUBCUTANEOUS at 05:02

## 2024-02-09 RX ADMIN — SODIUM CHLORIDE, POTASSIUM CHLORIDE, SODIUM LACTATE AND CALCIUM CHLORIDE: 600; 310; 30; 20 INJECTION, SOLUTION INTRAVENOUS at 11:02

## 2024-02-09 RX ADMIN — MAGNESIUM SULFATE HEPTAHYDRATE: 500 INJECTION, SOLUTION INTRAMUSCULAR; INTRAVENOUS at 09:02

## 2024-02-09 RX ADMIN — SMOFLIPID 250 ML: 6; 6; 5; 3 INJECTION, EMULSION INTRAVENOUS at 09:02

## 2024-02-09 RX ADMIN — ACETAMINOPHEN 1000 MG: 500 TABLET ORAL at 09:02

## 2024-02-09 NOTE — PROGRESS NOTES
Pt seen for ostomy care. Ostomy pouch is intact with a good seal. Pouch connected to a drainage bag. Pt refused pouch change at this time. Will follow-up tomorrow.

## 2024-02-09 NOTE — ASSESSMENT & PLAN NOTE
Edita Riley is a 59 y.o. female with a history of cervical cancer s/p XRT complicated by end-stage bladder and distal ureteral strictures. She underwent urinary diversion with a transverse colon conduit in 2020 complicated by a bowel perforation resulting in an ileostomy creation for GI diversion. She is now POD s/p surgical repair of her left ureterocolic anastomotic stricture and ileostomy reversal.         - Pain - MMPC  - Diet - npo w/sips and chips.   - mIVF: LR @ 125 mL/hr   - maintain drains   - NGT w/minimal output to LIWS, may dc soon pending continued flatus/bm  - Nausea control w/ PRN antiemetics   - OOB, aim to ambulate today   - PT/OT  - Encourage IS  - DVT ppx: sqh   - daily labs  - restarting home meds as appropriate   - Reordering TPN    - Dispo: continue inpatient care

## 2024-02-09 NOTE — PROGRESS NOTES
Colon and Rectal Surgery Progress Note    Patient name: Edita Riley   YOB: 1965   MRN: [id number]    Patient Name: Edita Riley  Date: 2/9/2024    Subjective:  NAEO. Pain controlled. Passing flatus, one liquid BM yesterday. No nausea. NG with 700 out yesterday, very thin green output.   Medications:    Current Facility-Administered Medications:     acetaminophen tablet 1,000 mg, 1,000 mg, Oral, Q8H, Marlo Gabriel MD, 1,000 mg at 02/09/24 0513    cetirizine tablet 10 mg, 10 mg, Oral, Daily, Nolan Hartman MD, 10 mg at 02/08/24 0843    ertapenem (INVANZ) 1 g in sodium chloride 0.9 % 100 mL IVPB (MB+), 1 g, Intravenous, Q24H, Megha Zhang PA-C, Stopped at 02/08/24 1447    heparin (porcine) injection 5,000 Units, 5,000 Units, Subcutaneous, Q8H, Nolan Hartman MD, 5,000 Units at 02/09/24 0513    HYDROmorphone injection 1 mg, 1 mg, Intravenous, Q6H PRN, Marlo Gabriel MD, 1 mg at 02/08/24 2147    lactated ringers infusion, , Intravenous, Continuous, Nolan Hartman MD, Last Rate: 125 mL/hr at 02/09/24 0026, New Bag at 02/09/24 0026    LIDOcaine HCL 10 mg/ml (1%) injection 1 mL, 1 mL, Intradermal, Once PRN, Marlo Gabriel MD    lipid (SMOFLIPID) (SMOFLIPID) 20 % infusion 250 mL, 250 mL, Intravenous, Daily, Leslie Balbuena MD, Last Rate: 20.83 mL/hr at 02/08/24 2205, 250 mL at 02/08/24 2205    melatonin tablet 6 mg, 6 mg, Oral, Nightly PRN, Nolan Hartman MD, 6 mg at 02/08/24 2147    methocarbamoL tablet 500 mg, 500 mg, Oral, QID, Marlo Gabriel MD, 500 mg at 02/08/24 2147    mirtazapine tablet 30 mg, 30 mg, Oral, Nightly, Nolan Hartman MD, 30 mg at 02/08/24 2147    ondansetron injection 4 mg, 4 mg, Intravenous, Q6H PRN, Nolan Hartman MD, 4 mg at 02/06/24 2350    oxyCODONE immediate release tablet 5 mg, 5 mg, Oral, Q6H PRN, Marlo Gabriel MD    oxyCODONE immediate release tablet Tab 10 mg, 10 mg, Oral, Q6H PRN, Marlo Gabriel MD, 10 mg at  02/08/24 1832    pregabalin capsule 150 mg, 150 mg, Oral, QHS, Nolan Hartman MD, 150 mg at 02/08/24 2147    prochlorperazine injection Soln 5 mg, 5 mg, Intravenous, Q6H PRN, Nolan Hartman MD    sodium bicarbonate tablet 1,300 mg, 1,300 mg, Oral, BID, Nolan Hartman MD, 1,300 mg at 02/08/24 2147    sodium chloride 0.9% flush 10 mL, 10 mL, Intravenous, PRN, Nolan Hartman MD    Flushing PICC/Midline Protocol, , , Until Discontinued **AND** sodium chloride 0.9% flush 10 mL, 10 mL, Intravenous, Q6H, 10 mL at 02/09/24 0513 **AND** sodium chloride 0.9% flush 10 mL, 10 mL, Intravenous, PRN, Leslie Balbuena MD, 10 mL at 02/08/24 1732    Standard Custom Day [employer] ADULT TPN for patient WITH electrolyte abnormality or renal dysfunction (CENTRAL), , Intravenous, Continuous, Marlo Gabriel MD, Last Rate: 42 mL/hr at 02/08/24 2205, New Bag at 02/08/24 2205    Vital Signs:  Vitals:    02/09/24 0518   BP: 139/62   Pulse: 84   Resp: 18   Temp: 98.5 °F (36.9 °C)       In/Out:  Intake/Output - Last 3 Shifts         02/07 0700 02/08 0659 02/08 0700 02/09 0659    I.V. (mL/kg) 1421.4 (17.3) 1500 (18.3)    NG/GT 50     IV Piggyback 57.3     Total Intake(mL/kg) 1528.7 (18.6) 1500 (18.3)    Urine (mL/kg/hr) 1200 (0.6) 1000 (0.5)    Drains 1025 750    Stool 0     Total Output 2225 1750    Net -696.3 -250          Stool Occurrence 0 x             Physical Exam:  General: Alert, oriented, in no apparent distress  HEENT: Sclera anicteric, trachea midline  Lungs: Normal respiratory rate and effort on room air  Abdomen: Soft, appropriate socorro-incisional TTP, nondistended. Incisions clean/dry/intact without erythema or drainage. ORESTES drains SS output. Previous ileostomy site packing removed. Urostomy with stent in place.   Extremities: Warm, well perfused, no edema, SCDs in place  Neuro: Grossly intact, moves all extremities  Psych: Appropriate affect    Laboratory Studies:  Complete Blood Count:  Lab Results   Component Value  Date/Time    WBC 8.70 02/09/2024 04:20 AM    HGB 9.7 (L) 02/09/2024 04:20 AM    HCT 32.3 (L) 02/09/2024 04:20 AM    HCT 43 12/12/2023 01:33 PM    RBC 3.43 (L) 02/09/2024 04:20 AM     02/09/2024 04:20 AM       Basic Chemistry Panel:  Lab Results   Component Value Date/Time     (H) 02/09/2024 04:20 AM    K 2.8 (L) 02/09/2024 04:20 AM     02/09/2024 04:20 AM    CO2 31 (H) 02/09/2024 04:20 AM    BUN 12 02/09/2024 04:20 AM    CREATININE 1.2 02/09/2024 04:20 AM    GLUCOSE 140 (H) 04/30/2020 05:13 AM    CALCIUM 8.6 (L) 02/09/2024 04:20 AM       Lab Results   Component Value Date/Time    .4 (H) 02/09/2024 04:20 AM       Imaging Studies:  None new    Assessment:  Edita Riley is a 59 y.o. year old female with history of ureteral stenosis after radiation for cervical cancer requiring urostomy (transverse colon conduit) complicated by anastomotic leak requiring right colectomy and end ileostomy now s/p ex lap, extensive maliha, revision of urostomy, small bowel resection, ileostomy reversal w/ ileosigmoid anastomosis on 2/6    Plan:  POD3  Pain better controlled.   Prn anti-emetics  NG in place, DC today  Start CLD  Passing lots of gas yesterday. 1 liquid BM overnight.  Ambulate, IS  Packing removed from previous ileostomy site  Replete lytes  , trend  Rest per primary    Bill Mercado MD  Colorectal Surgery Fellow

## 2024-02-09 NOTE — PLAN OF CARE
[facility] Plan of Care Note     Dx:   Small bowel anastomotic stricture [K91.89, K91.30]  Hydronephrosis of left kidney [N13.30]     Shift Events: Uneventful.     Goals of Care: Goals of care ongoing and progressing.      Neuro: WDL     Vital Signs: Stable      Respiratory: WDL     Diet: Diet NPO Except for: Sips with Medication, Ice Chips, Medication     Is patient tolerating current diet? Yes     GTTS: LR 125ml/hr, TPN     Urine Output/Bowel Movement:   No intake/output data recorded.  Last Bowel Movement: 02/05/24        Drains/Tubes/Tube Feeds (include total output/shift): Nephrostomy, Urostomy, NG tube to L nare at Low wall INT suction, ORESTES drain     Lines: PIV x2, PICC x2 lumen to RUE     Accuchecks: NA     Skin: WDL ex incision/ drains     Fall Risk Score: Moderate      Activity level? Assist x1     Any scheduled procedures? NA     Any safety concerns? NA     Problem: Adult Inpatient Plan of Care  Goal: Plan of Care Review  Outcome: Ongoing, Progressing  Goal: Patient-Specific Goal (Individualized)  Outcome: Ongoing, Progressing  Goal: Absence of Hospital-Acquired Illness or Injury  Outcome: Ongoing, Progressing  Goal: Optimal Comfort and Wellbeing  Outcome: Ongoing, Progressing  Goal: Readiness for Transition of Care  Outcome: Ongoing, Progressing     Problem: Fluid and Electrolyte Imbalance (Acute Kidney Injury/Impairment)  Goal: Fluid and Electrolyte Balance  Outcome: Ongoing, Progressing     Problem: Oral Intake Inadequate (Acute Kidney Injury/Impairment)  Goal: Optimal Nutrition Intake  Outcome: Ongoing, Progressing     Problem: Renal Function Impairment (Acute Kidney Injury/Impairment)  Goal: Effective Renal Function  Outcome: Ongoing, Progressing     Problem: Impaired Wound Healing  Goal: Optimal Wound Healing  Outcome: Ongoing, Progressing     Problem: Fall Injury Risk  Goal: Absence of Fall and Fall-Related Injury  Outcome: Ongoing, Progressing     Problem: Infection  Goal: Absence of Infection Signs and  Symptoms  Outcome: Ongoing, Progressing

## 2024-02-09 NOTE — SUBJECTIVE & OBJECTIVE
Interval History: No acute events overnight. Hemodynamically stable. Passed flatus however did not have BM.         Objective:     Temp:  [97.9 °F (36.6 °C)-98.5 °F (36.9 °C)] 98.5 °F (36.9 °C)  Pulse:  [73-88] 84  Resp:  [16-18] 18  SpO2:  [95 %-98 %] 96 %  BP: (111-139)/(57-68) 139/62     Body mass index is 26.7 kg/m².           Drains       Drain  Duration                  Suprapubic Catheter 100% silicone 16 Fr. -- days         Nephrostomy 02/02/24 1255 Left 12 Fr. 5 days         Closed/Suction Drain 02/06/24 Tube - 1 Midline Abdomen Bulb 15 Fr. 2 days         NG/OG Tube 02/06/24 0748 nasogastric 1 day         Urostomy 02/06/24 1815 ureterostomy, left 1 day         NG/OG Tube 02/07/24 1036 nasogastric 16 Fr. Left nostril <1 day                     Physical Exam  Constitutional:       General: She is not in acute distress.     Appearance: Normal appearance.   HENT:      Head: Normocephalic and atraumatic.      Nose: Nose normal.      Comments: NGT in place to LIWS, minimal green output  Eyes:      Conjunctiva/sclera: Conjunctivae normal.   Cardiovascular:      Rate and Rhythm: Normal rate.   Pulmonary:      Effort: Pulmonary effort is normal. No respiratory distress.   Abdominal:      General: There is no distension.      Palpations: Abdomen is soft.      Comments: Incision CDI  Abdomen appropriately ttp   Urostomy in place, stent appears appropriately placed, urostomy with CYU output, no surrounding erythema/signs of dehiscence/signs of infection  Prior ileostomy site with packing in place, no significant discharge or purulence  L NT in place with cyu  ORESTES in place w/ss output    Musculoskeletal:         General: No deformity.   Skin:     General: Skin is warm and dry.   Neurological:      General: No focal deficit present.      Mental Status: She is alert.   Psychiatric:         Mood and Affect: Mood normal.         Behavior: Behavior normal.           Significant Labs:    BMP:  Recent Labs   Lab 02/07/24  6420  02/08/24  0657 02/09/24  0420    145 146*   K 4.1 3.2* 2.8*   * 114* 109   CO2 15* 24 31*   BUN 13 13 12   CREATININE 1.4 1.1 1.2   CALCIUM 8.3* 8.5* 8.6*         CBC:   Recent Labs   Lab 02/07/24  0242 02/08/24  0657 02/09/24  0420   WBC 14.87* 11.77 8.70   HGB 12.5 9.9* 9.7*   HCT 40.1 31.6* 32.3*    267 203         All pertinent labs results from the past 24 hours have been reviewed.    Significant Imaging:  All pertinent imaging results/findings from the past 24 hours have been reviewed.                Review of Systems

## 2024-02-09 NOTE — PROGRESS NOTES
[facility]  Urology  Progress Note    Patient Name: Edita Riley  MRN: [id number]  Admission Date: 2/6/2024  Hospital Length of Stay: 3 days  Code Status: Full Code   Attending Provider: Leslie Balbuena MD   Primary Care Physician: Trina Vu MD    Subjective:     HPI:  No notes on file    Interval History: No acute events overnight. Hemodynamically stable. Passed flatus however did not have BM.         Objective:     Temp:  [97.9 °F (36.6 °C)-98.5 °F (36.9 °C)] 98.5 °F (36.9 °C)  Pulse:  [73-88] 84  Resp:  [16-18] 18  SpO2:  [95 %-98 %] 96 %  BP: (111-139)/(57-68) 139/62     Body mass index is 26.7 kg/m².           Drains       Drain  Duration                  Suprapubic Catheter 100% silicone 16 Fr. -- days         Nephrostomy 02/02/24 1255 Left 12 Fr. 5 days         Closed/Suction Drain 02/06/24 Tube - 1 Midline Abdomen Bulb 15 Fr. 2 days         NG/OG Tube 02/06/24 0748 nasogastric 1 day         Urostomy 02/06/24 1815 ureterostomy, left 1 day         NG/OG Tube 02/07/24 1036 nasogastric 16 Fr. Left nostril <1 day                     Physical Exam  Constitutional:       General: She is not in acute distress.     Appearance: Normal appearance.   HENT:      Head: Normocephalic and atraumatic.      Nose: Nose normal.      Comments: NGT in place to LIWS, minimal green output  Eyes:      Conjunctiva/sclera: Conjunctivae normal.   Cardiovascular:      Rate and Rhythm: Normal rate.   Pulmonary:      Effort: Pulmonary effort is normal. No respiratory distress.   Abdominal:      General: There is no distension.      Palpations: Abdomen is soft.      Comments: Incision CDI  Abdomen appropriately ttp   Urostomy in place, stent appears appropriately placed, urostomy with CYU output, no surrounding erythema/signs of dehiscence/signs of infection  Prior ileostomy site with packing in place, no significant discharge or purulence  L NT in place with cyu  ORESTES in place w/ss output    Musculoskeletal:          General: No deformity.   Skin:     General: Skin is warm and dry.   Neurological:      General: No focal deficit present.      Mental Status: She is alert.   Psychiatric:         Mood and Affect: Mood normal.         Behavior: Behavior normal.           Significant Labs:    BMP:  Recent Labs   Lab 02/07/24  0242 02/08/24  0657 02/09/24  0420    145 146*   K 4.1 3.2* 2.8*   * 114* 109   CO2 15* 24 31*   BUN 13 13 12   CREATININE 1.4 1.1 1.2   CALCIUM 8.3* 8.5* 8.6*         CBC:   Recent Labs   Lab 02/07/24  0242 02/08/24  0657 02/09/24  0420   WBC 14.87* 11.77 8.70   HGB 12.5 9.9* 9.7*   HCT 40.1 31.6* 32.3*    267 203         All pertinent labs results from the past 24 hours have been reviewed.    Significant Imaging:  All pertinent imaging results/findings from the past 24 hours have been reviewed.                Review of Systems    Assessment/Plan:     Ileostomy in place  Edita Riley is a 59 y.o. female with a history of cervical cancer s/p XRT complicated by end-stage bladder and distal ureteral strictures. She underwent urinary diversion with a transverse colon conduit in 2020 complicated by a bowel perforation resulting in an ileostomy creation for GI diversion. She is now POD s/p surgical repair of her left ureterocolic anastomotic stricture and ileostomy reversal.         - Pain - MMPC  - Diet - npo w/sips and chips.   - mIVF: LR @ 125 mL/hr   - maintain drains   - NGT w/minimal output to LIWS, may dc soon pending continued flatus/bm  - Nausea control w/ PRN antiemetics   - OOB, aim to ambulate today   - PT/OT  - Encourage IS  - DVT ppx: sqh   - daily labs  - restarting home meds as appropriate   - Reordering TPN    - Dispo: continue inpatient care           VTE Risk Mitigation (From admission, onward)           Ordered     heparin (porcine) injection 5,000 Units  Every 8 hours         02/06/24 1725     Place ANANT hose  Until discontinued         02/06/24 0526     Place  sequential compression device  Until discontinued         02/06/24 1207                    Lupe Oneil MD  Urology  [facility]

## 2024-02-09 NOTE — PROGRESS NOTES
[facility]  Wound Care    Patient Name:  Edita Riley   MRN:  [id number]  Date: 2/9/2024  Diagnosis: Hydronephrosis of left kidney    History:     Past Medical History:   Diagnosis Date    Abnormal mammogram 08/25/2020    Abnormal mammogram 08/25/2020    Acute blood loss anemia     Acute deep vein thrombosis (DVT) of lower extremity 12/09/2020    Advance care planning 04/30/2021    ODESSA (acute kidney injury) 03/21/2020    Anemia due to chronic blood loss     Anemia due to chronic kidney disease     Anemia due to chronic kidney disease 05/18/2020    Anxiety     Bilateral ureteral obstruction 09/11/2020    Bilious vomiting with nausea 06/11/2023    Cardiovascular event risk -- low 09/14/2015    ASCVD 10-year risk 1.9% (with optimal risk factors 1.3%) as of 9/14/2015     Cervical cancer 2014    Chronic back pain     Colostomy care     Deep vein thrombosis     Depression     Diarrhea due to malabsorption 11/14/2018    Difficult intubation     Discharge planning issues 04/30/2021    Disorder of kidney and ureter     DVT of lower extremity, bilateral 11/04/2020    Edema 04/10/2023    Emphysema of lung 04/10/2023    Fibromyalgia     Fungemia 09/27/2020    Generalized abdominal pain 08/25/2020    GERD (gastroesophageal reflux disease)     Hemifacial spasm 09/16/2015    Hiatal hernia 2014    History of cervical cancer 10/11/2018    History of essential hypertension 05/04/2015    Not requiring medications at this time  BP is low- concern that this may correlate with increased stool output from stoma. Encourage her to contact GI and her PCP.    Hx of psychiatric care     Cymbalta, trazodone    Hypertension     Hypomagnesemia 11/21/2018    Hyponatremia 09/10/2023    Impaired functional mobility, balance, gait, and endurance 07/24/2015    Lactose intolerance     Major depressive disorder, recurrent episode, moderate 06/02/2023    Metastatic squamous cell carcinoma to lymph node 10/11/2018    Moderate episode of  recurrent major depressive disorder 2021    Nephrostomy complication 10/26/2023    Neuropathy due to chemotherapeutic drug 2018    Osteoarthritis of back     Peritonitis 2020    Physical deconditioning 2021    Pseudomonas urinary tract infection 2021    Psychiatric problem     Pyelitis 2023    QT prolongation 2021    Refusal of blood transfusions as patient is Religious     Estephanieki's ring 2015    Seen on outside EGD 2014, underwent esophageal dilatation. Bx were negative.     Seizure-like activity 2018    Seizures     Sleep stage dysfunction     Noted on PSG 2017; negative for obstructive sleep apnea     Stroke     Urinary tract infection associated with nephrostomy catheter 2020    Wound infection after surgery 2020       Social History     Socioeconomic History    Marital status:      Spouse name: Hammad    Number of children: 2   Tobacco Use    Smoking status: Never    Smokeless tobacco: Never   Substance and Sexual Activity    Alcohol use: No     Alcohol/week: 0.0 standard drinks of alcohol    Drug use: No    Sexual activity: Yes     Partners: Male     Birth control/protection: None     Comment:  19 years since    Social History Narrative    , twin daughters (1  2018), disabled due to childhood stroke, Religious sophia     Social Determinants of Health     Financial Resource Strain: Medium Risk (2024)    Overall Financial Resource Strain (CARDIA)     Difficulty of Paying Living Expenses: Somewhat hard   Food Insecurity: No Food Insecurity (2024)    Hunger Vital Sign     Worried About Running Out of Food in the Last Year: Never true     Ran Out of Food in the Last Year: Never true   Transportation Needs: No Transportation Needs (2024)    PRAPARE - Transportation     Lack of Transportation (Medical): No     Lack of Transportation (Non-Medical): No   Physical Activity:  Insufficiently Active (2/7/2024)    Exercise Vital Sign     Days of Exercise per Week: 4 days     Minutes of Exercise per Session: 20 min   Stress: Stress Concern Present (2/7/2024)    [employer] - Occupational Stress Questionnaire     Feeling of Stress : To some extent   Social Connections: Moderately Integrated (2/7/2024)    Social Connection and Isolation Panel [NHANES]     Frequency of Communication with Friends and Family: Three times a week     Frequency of Social Gatherings with Friends and Family: Once a week     Attends Jain Services: 1 to 4 times per year     Active Member of Clubs or Organizations: No     Attends Club or Organization Meetings: Never     Marital Status:    Housing Stability: Low Risk  (2/7/2024)    Housing Stability Vital Sign     Unable to Pay for Housing in the Last Year: No     Number of Places Lived in the Last Year: 1     Unstable Housing in the Last Year: No       Precautions:     Allergies as of 09/22/2023 - Reviewed 09/21/2023   Allergen Reaction Noted    Bee sting [allergen ext-venom-honey bee]  05/04/2015    Grass pollen-bermuda, standard  05/04/2015       WOC Assessment Details/Treatment   Met with patient for ostomy care. Removed patient's ostomy pouch. Discussed and performed stoma and socorro-stomal care. A vale 2-piece convex pouch was cut to fit site and applied without difficulty. Pouch connected to a drainage bag. Reviewed the reading materials and answered all questions. Pt verbalized understanding of when to alert MD of issues with stoma. Discussed follow up plan of care and provided patient with contact name and numbers. Supplies for discharge at the bedside. Wound care will follow-up on Monday.      02/09/24 0845        Urostomy 02/06/24 1815 ureterostomy, left   Date of First Assessment/Time of First Assessment: 02/06/24 1815   Urostomy Type: ureterostomy, left   Wound Image    Stoma Appearance moist;oval;pink   Stoma Size  (in)   ([id number])   Stomal Appliance Changed;2 piece;No Leakage;Convexity   Accessories/Skin Care barrier substance over peristomal skin;cleansed w/ water   Stoma Function yellow urine   Peristomal Skin dry   Tolerance no signs/symptoms of discomfort;did not assist with stoma care;did not assist with appliance change   Treatment Bag change;Site care   Output (mL) 270 mL         02/09/2024

## 2024-02-10 LAB
ANION GAP SERPL CALC-SCNC: 8 MMOL/L (ref 8–16)
BASOPHILS # BLD AUTO: 0.02 K/UL (ref 0–0.2)
BASOPHILS NFR BLD: 0.3 % (ref 0–1.9)
BUN SERPL-MCNC: 11 MG/DL (ref 8–23)
CALCIUM SERPL-MCNC: 8.3 MG/DL (ref 8.7–10.5)
CHLORIDE SERPL-SCNC: 106 MMOL/L (ref 95–110)
CO2 SERPL-SCNC: 32 MMOL/L (ref 23–29)
CREAT SERPL-MCNC: 1 MG/DL (ref 0.5–1.4)
CRP SERPL-MCNC: 65.6 MG/L (ref 0–8.2)
DIFFERENTIAL METHOD BLD: ABNORMAL
EOSINOPHIL # BLD AUTO: 0.3 K/UL (ref 0–0.5)
EOSINOPHIL NFR BLD: 5.1 % (ref 0–8)
ERYTHROCYTE [DISTWIDTH] IN BLOOD BY AUTOMATED COUNT: 15.6 % (ref 11.5–14.5)
EST. GFR  (NO RACE VARIABLE): >60 ML/MIN/1.73 M^2
GLUCOSE SERPL-MCNC: 120 MG/DL (ref 70–110)
HCT VFR BLD AUTO: 31.9 % (ref 37–48.5)
HGB BLD-MCNC: 9.3 G/DL (ref 12–16)
IMM GRANULOCYTES # BLD AUTO: 0.04 K/UL (ref 0–0.04)
IMM GRANULOCYTES NFR BLD AUTO: 0.6 % (ref 0–0.5)
LYMPHOCYTES # BLD AUTO: 1.1 K/UL (ref 1–4.8)
LYMPHOCYTES NFR BLD: 16.2 % (ref 18–48)
MAGNESIUM SERPL-MCNC: 1.7 MG/DL (ref 1.6–2.6)
MCH RBC QN AUTO: 28.3 PG (ref 27–31)
MCHC RBC AUTO-ENTMCNC: 29.2 G/DL (ref 32–36)
MCV RBC AUTO: 97 FL (ref 82–98)
MONOCYTES # BLD AUTO: 0.8 K/UL (ref 0.3–1)
MONOCYTES NFR BLD: 11.9 % (ref 4–15)
NEUTROPHILS # BLD AUTO: 4.4 K/UL (ref 1.8–7.7)
NEUTROPHILS NFR BLD: 65.9 % (ref 38–73)
NRBC BLD-RTO: 0 /100 WBC
PHOSPHATE SERPL-MCNC: 2.3 MG/DL (ref 2.7–4.5)
PLATELET # BLD AUTO: 236 K/UL (ref 150–450)
PMV BLD AUTO: 9.7 FL (ref 9.2–12.9)
POTASSIUM SERPL-SCNC: 3 MMOL/L (ref 3.5–5.1)
RBC # BLD AUTO: 3.29 M/UL (ref 4–5.4)
SODIUM SERPL-SCNC: 146 MMOL/L (ref 136–145)
WBC # BLD AUTO: 6.65 K/UL (ref 3.9–12.7)

## 2024-02-10 PROCEDURE — 25000003 PHARM REV CODE 250: Performed by: UROLOGY

## 2024-02-10 PROCEDURE — 85025 COMPLETE CBC W/AUTO DIFF WBC: CPT | Performed by: STUDENT IN AN ORGANIZED HEALTH CARE EDUCATION/TRAINING PROGRAM

## 2024-02-10 PROCEDURE — 80048 BASIC METABOLIC PNL TOTAL CA: CPT | Performed by: STUDENT IN AN ORGANIZED HEALTH CARE EDUCATION/TRAINING PROGRAM

## 2024-02-10 PROCEDURE — A4216 STERILE WATER/SALINE, 10 ML: HCPCS | Performed by: UROLOGY

## 2024-02-10 PROCEDURE — 25000003 PHARM REV CODE 250

## 2024-02-10 PROCEDURE — 83735 ASSAY OF MAGNESIUM: CPT | Performed by: STUDENT IN AN ORGANIZED HEALTH CARE EDUCATION/TRAINING PROGRAM

## 2024-02-10 PROCEDURE — 86140 C-REACTIVE PROTEIN: CPT | Performed by: STUDENT IN AN ORGANIZED HEALTH CARE EDUCATION/TRAINING PROGRAM

## 2024-02-10 PROCEDURE — 25000003 PHARM REV CODE 250: Performed by: STUDENT IN AN ORGANIZED HEALTH CARE EDUCATION/TRAINING PROGRAM

## 2024-02-10 PROCEDURE — 94761 N-INVAS EAR/PLS OXIMETRY MLT: CPT

## 2024-02-10 PROCEDURE — 63600175 PHARM REV CODE 636 W HCPCS: Mod: JZ,JG

## 2024-02-10 PROCEDURE — 63600175 PHARM REV CODE 636 W HCPCS: Performed by: PHYSICIAN ASSISTANT

## 2024-02-10 PROCEDURE — A4217 STERILE WATER/SALINE, 500 ML: HCPCS

## 2024-02-10 PROCEDURE — 25000003 PHARM REV CODE 250: Performed by: PHYSICIAN ASSISTANT

## 2024-02-10 PROCEDURE — 20600001 HC STEP DOWN PRIVATE ROOM

## 2024-02-10 PROCEDURE — 63600175 PHARM REV CODE 636 W HCPCS: Performed by: STUDENT IN AN ORGANIZED HEALTH CARE EDUCATION/TRAINING PROGRAM

## 2024-02-10 PROCEDURE — 63600175 PHARM REV CODE 636 W HCPCS

## 2024-02-10 PROCEDURE — B4185 PARENTERAL SOL 10 GM LIPIDS: HCPCS

## 2024-02-10 PROCEDURE — 84100 ASSAY OF PHOSPHORUS: CPT | Performed by: STUDENT IN AN ORGANIZED HEALTH CARE EDUCATION/TRAINING PROGRAM

## 2024-02-10 RX ORDER — SODIUM,POTASSIUM PHOSPHATES 280-250MG
2 POWDER IN PACKET (EA) ORAL ONCE
Status: DISCONTINUED | OUTPATIENT
Start: 2024-02-10 | End: 2024-02-10

## 2024-02-10 RX ORDER — ACETAMINOPHEN 10 MG/ML
1000 INJECTION, SOLUTION INTRAVENOUS EVERY 8 HOURS
Status: COMPLETED | OUTPATIENT
Start: 2024-02-10 | End: 2024-02-11

## 2024-02-10 RX ORDER — POTASSIUM CHLORIDE 7.45 MG/ML
10 INJECTION INTRAVENOUS
Status: COMPLETED | OUTPATIENT
Start: 2024-02-10 | End: 2024-02-11

## 2024-02-10 RX ADMIN — HEPARIN SODIUM 5000 UNITS: 5000 INJECTION INTRAVENOUS; SUBCUTANEOUS at 05:02

## 2024-02-10 RX ADMIN — HEPARIN SODIUM 5000 UNITS: 5000 INJECTION INTRAVENOUS; SUBCUTANEOUS at 02:02

## 2024-02-10 RX ADMIN — SODIUM BICARBONATE 650 MG TABLET 1300 MG: at 08:02

## 2024-02-10 RX ADMIN — CETIRIZINE HYDROCHLORIDE 10 MG: 10 TABLET, FILM COATED ORAL at 08:02

## 2024-02-10 RX ADMIN — POTASSIUM CHLORIDE 10 MEQ: 7.46 INJECTION, SOLUTION INTRAVENOUS at 06:02

## 2024-02-10 RX ADMIN — POTASSIUM CHLORIDE 10 MEQ: 7.46 INJECTION, SOLUTION INTRAVENOUS at 08:02

## 2024-02-10 RX ADMIN — METHOCARBAMOL 500 MG: 500 TABLET ORAL at 08:02

## 2024-02-10 RX ADMIN — POTASSIUM CHLORIDE 10 MEQ: 7.46 INJECTION, SOLUTION INTRAVENOUS at 09:02

## 2024-02-10 RX ADMIN — SMOFLIPID 250 ML: 6; 6; 5; 3 INJECTION, EMULSION INTRAVENOUS at 09:02

## 2024-02-10 RX ADMIN — SODIUM CHLORIDE, POTASSIUM CHLORIDE, SODIUM LACTATE AND CALCIUM CHLORIDE: 600; 310; 30; 20 INJECTION, SOLUTION INTRAVENOUS at 10:02

## 2024-02-10 RX ADMIN — METHOCARBAMOL 500 MG: 100 INJECTION, SOLUTION INTRAMUSCULAR; INTRAVENOUS at 11:02

## 2024-02-10 RX ADMIN — POTASSIUM CHLORIDE 10 MEQ: 7.46 INJECTION, SOLUTION INTRAVENOUS at 11:02

## 2024-02-10 RX ADMIN — HYDROMORPHONE HYDROCHLORIDE 1 MG: 1 INJECTION, SOLUTION INTRAMUSCULAR; INTRAVENOUS; SUBCUTANEOUS at 10:02

## 2024-02-10 RX ADMIN — MAGNESIUM SULFATE HEPTAHYDRATE: 500 INJECTION, SOLUTION INTRAMUSCULAR; INTRAVENOUS at 09:02

## 2024-02-10 RX ADMIN — ERTAPENEM 1 G: 1 INJECTION INTRAMUSCULAR; INTRAVENOUS at 03:02

## 2024-02-10 RX ADMIN — ONDANSETRON 4 MG: 2 INJECTION INTRAMUSCULAR; INTRAVENOUS at 03:02

## 2024-02-10 RX ADMIN — HYDROMORPHONE HYDROCHLORIDE 1 MG: 1 INJECTION, SOLUTION INTRAMUSCULAR; INTRAVENOUS; SUBCUTANEOUS at 09:02

## 2024-02-10 RX ADMIN — SODIUM CHLORIDE, POTASSIUM CHLORIDE, SODIUM LACTATE AND CALCIUM CHLORIDE: 600; 310; 30; 20 INJECTION, SOLUTION INTRAVENOUS at 12:02

## 2024-02-10 RX ADMIN — ACETAMINOPHEN 1000 MG: 10 INJECTION, SOLUTION INTRAVENOUS at 09:02

## 2024-02-10 RX ADMIN — POTASSIUM CHLORIDE 10 MEQ: 7.46 INJECTION, SOLUTION INTRAVENOUS at 05:02

## 2024-02-10 RX ADMIN — HEPARIN SODIUM 5000 UNITS: 5000 INJECTION INTRAVENOUS; SUBCUTANEOUS at 09:02

## 2024-02-10 RX ADMIN — OXYCODONE HYDROCHLORIDE 10 MG: 10 TABLET ORAL at 08:02

## 2024-02-10 RX ADMIN — Medication 10 ML: at 06:02

## 2024-02-10 RX ADMIN — ACETAMINOPHEN 1000 MG: 500 TABLET ORAL at 05:02

## 2024-02-10 RX ADMIN — Medication 10 ML: at 12:02

## 2024-02-10 NOTE — SUBJECTIVE & OBJECTIVE
Interval History: NAEON. AFVSS. Some mild nausea, no emesis. Tolerating CLD since yesterday, reports drinking 2-3 cups. Reports some mild abdominal pain this am. Ambulating hallway x 2.         Objective:     Temp:  [97.5 °F (36.4 °C)-98.2 °F (36.8 °C)] 98.2 °F (36.8 °C)  Pulse:  [68-91] 85  Resp:  [16-20] 18  SpO2:  [95 %-98 %] 95 %  BP: (123-157)/(58-84) 127/72     Body mass index is 26.7 kg/m².           Drains       Drain  Duration                  Suprapubic Catheter 100% silicone 16 Fr. -- days         Nephrostomy 02/02/24 1255 Left 12 Fr. 5 days         Closed/Suction Drain 02/06/24 Tube - 1 Midline Abdomen Bulb 15 Fr. 2 days         NG/OG Tube 02/06/24 0748 nasogastric 1 day         Urostomy 02/06/24 1815 ureterostomy, left 1 day         NG/OG Tube 02/07/24 1036 nasogastric 16 Fr. Left nostril <1 day                     Physical Exam  Constitutional:       General: She is not in acute distress.     Appearance: Normal appearance.   HENT:      Head: Normocephalic and atraumatic.      Nose: Nose normal.   Eyes:      Conjunctiva/sclera: Conjunctivae normal.   Cardiovascular:      Rate and Rhythm: Normal rate.   Pulmonary:      Effort: Pulmonary effort is normal. No respiratory distress.   Abdominal:      Palpations: Abdomen is soft.      Comments: Incision CDI  Abdomen appropriately ttp. Soft, non-distended.   Urostomy in place, stent appears appropriately placed, urostomy with CYU output, no surrounding erythema/signs of dehiscence/signs of infection  Prior ileostomy site with packing in place, no significant discharge or purulence  L NT in place with cyu  ORESTES in place w/ minimal ss output    Musculoskeletal:         General: No deformity.   Skin:     General: Skin is warm and dry.   Neurological:      General: No focal deficit present.      Mental Status: She is alert.   Psychiatric:         Mood and Affect: Mood normal.         Behavior: Behavior normal.           Significant Labs:    BMP:  Recent Labs   Lab  02/08/24  0657 02/09/24  0420 02/10/24  0504    146* 146*   K 3.2* 2.8* 3.0*   * 109 106   CO2 24 31* 32*   BUN 13 12 11   CREATININE 1.1 1.2 1.0   CALCIUM 8.5* 8.6* 8.3*         CBC:   Recent Labs   Lab 02/08/24  0657 02/09/24  0420 02/10/24  0504   WBC 11.77 8.70 6.65   HGB 9.9* 9.7* 9.3*   HCT 31.6* 32.3* 31.9*    203 236         All pertinent labs results from the past 24 hours have been reviewed.    Significant Imaging:  All pertinent imaging results/findings from the past 24 hours have been reviewed.

## 2024-02-10 NOTE — PLAN OF CARE
Problem: Adult Inpatient Plan of Care  Goal: Plan of Care Review  Outcome: Ongoing, Progressing  Goal: Patient-Specific Goal (Individualized)  Outcome: Ongoing, Progressing  Goal: Absence of Hospital-Acquired Illness or Injury  Outcome: Ongoing, Progressing  Goal: Optimal Comfort and Wellbeing  Outcome: Ongoing, Progressing  Goal: Readiness for Transition of Care  Outcome: Ongoing, Progressing     Problem: Fluid and Electrolyte Imbalance (Acute Kidney Injury/Impairment)  Goal: Fluid and Electrolyte Balance  Outcome: Ongoing, Progressing     Problem: Oral Intake Inadequate (Acute Kidney Injury/Impairment)  Goal: Optimal Nutrition Intake  Outcome: Ongoing, Progressing     Problem: Renal Function Impairment (Acute Kidney Injury/Impairment)  Goal: Effective Renal Function  Outcome: Ongoing, Progressing     Problem: Impaired Wound Healing  Goal: Optimal Wound Healing  Outcome: Ongoing, Progressing     Problem: Fall Injury Risk  Goal: Absence of Fall and Fall-Related Injury  Outcome: Ongoing, Progressing     Problem: Infection  Goal: Absence of Infection Signs and Symptoms  Outcome: Ongoing, Progressing

## 2024-02-10 NOTE — ASSESSMENT & PLAN NOTE
Edita Riley is a 59 y.o. female with a history of cervical cancer s/p XRT complicated by end-stage bladder and distal ureteral strictures. She underwent urinary diversion with a transverse colon conduit in 2020 complicated by a bowel perforation resulting in an ileostomy creation for GI diversion. She is now s/p surgical repair of her left ureterocolic anastomotic stricture and ileostomy reversal on 2/6/24.     Now POD4    - Pain - MMPC  - Diet - continue CLD for now  - Low threshold to make NPO if nausea/emesis occurs  - mIVF: LR @ 125 mL/hr   - maintain drains   - Nausea control w/ PRN antiemetics   - OOB, ambulate 5x daily    - PT/OT  - Encourage IS  - DVT ppx: sqh   - daily labs  - restarting home meds as appropriate   - Reordering TPN  - Discussed with pharmacy regarding electrolytes, will replete K and Pharmacy will adjust lyte replacement via TPN    - Dispo: continue inpatient care

## 2024-02-10 NOTE — NURSING
[facility] Plan of Care Note    Dx small bowel anastomotic stricture and hydronephrosis of left kidney    Shift Events Uneventful    Goals of Care: pain management    Neuro: A+Ox4    Vital Signs: WDL    Respiratory: RA    Diet: Clear    Is patient tolerating current diet? Y    GTTS: TPN, lipids and LR    Urine Output/Bowel Movement: From Nephrostomy    Drains/Tubes/Tube Feeds (include total output/shift): Ureterostomy and nephrostomy    Lines: HELGA PICC      Accuchecks:N    Skin: Midline incision and ureterostomy    Fall Risk Score: 6    Activity level? X1 assist    Any scheduled procedures? No    Any safety concerns? No    Other: NA

## 2024-02-10 NOTE — HPI
[facility] 58 [facility] who presents with a history of radiation therapy for cervical cancer which was complicated by bilateral distal ureteral obstruction with bilateral hydronephrosis.  She is status post colon conduit which was performed in 2020.  This was complicated by large bowel perforation away from the anastamosis.  She has an ileostomy and a left percutaneous nephrostomy that was last exchanged on 10/26/2023.  She was admitted to the hospital on 12/7/2023 after her percutaneous nephrostomy tube was clogged and she had been feeling ill.  She was scheduled for revision but missed her appointment with Dr. Reynolds and had to be rescheduled.

## 2024-02-10 NOTE — SUBJECTIVE & OBJECTIVE
Subjective:     Interval History: No acute issues overnight. Nauseous this am, distended on exam. Had sips of clears. BM yesterday, no flatus/BM today. Pain not well controlled this am    Post-Op Info:  Procedure(s) (LRB):  REVISION, ANASTOMOSIS, URETEROILEAL (N/A)  OPEN LAPAROTOMY, EXPLORATORY (N/A)  CLOSURE, ILEOSTOMY (N/A)  ANASTOMOSIS, INTESTINE - Ileocolic anastomosis (N/A)  EXCISION, SMALL INTESTINE  LYSIS, ADHESIONS  LYSIS, ADHESIONS   4 Days Post-Op      Medications:  Continuous Infusions:   lactated ringers 125 mL/hr at 02/10/24 0008    TPN ADULT CENTRAL LINE CUSTOM 50 mL/hr at 02/09/24 2147     Scheduled Meds:   acetaminophen  1,000 mg Oral Q8H    cetirizine  10 mg Oral Daily    ertapenem (INVanz) IV (PEDS and ADULTS)  1 g Intravenous Q24H    heparin (porcine)  5,000 Units Subcutaneous Q8H    lipid (SMOFLIPID)  250 mL Intravenous Daily    methocarbamoL  500 mg Oral QID    mirtazapine  30 mg Oral Nightly    pregabalin  150 mg Oral QHS    sodium bicarbonate  1,300 mg Oral BID    sodium chloride 0.9%  10 mL Intravenous Q6H     PRN Meds:   HYDROmorphone    LIDOcaine HCL 10 mg/ml (1%)    melatonin    ondansetron    oxyCODONE    oxyCODONE    prochlorperazine    sodium chloride 0.9%    sodium chloride 0.9%        Objective:     Vital Signs (Most Recent):  Temp: 98 °F (36.7 °C) (02/10/24 0813)  Pulse: 91 (02/10/24 0813)  Resp: 18 (02/10/24 0813)  BP: (!) 157/69 (02/10/24 0813)  SpO2: 96 % (02/10/24 0813) Vital Signs (24h Range):  Temp:  [97.5 °F (36.4 °C)-98.2 °F (36.8 °C)] 98 °F (36.7 °C)  Pulse:  [68-91] 91  Resp:  [16-20] 18  SpO2:  [95 %-98 %] 96 %  BP: (122-157)/(58-84) 157/69     Intake/Output - Last 3 Shifts         02/08 0700  02/09 0659 02/09 0700  02/10 0659 02/10 0700 02/11 0659    P.O.  330     I.V. (mL/kg) 1500 (18.3) 1500 (18.3)     NG/GT       IV Piggyback  249.9     TPN  1057     Total Intake(mL/kg) 1500 (18.3) 3136.9 (38.3)     Urine (mL/kg/hr) 1000 (0.5) 1280 (0.7)     Drains 750 15     Stool   150     Total Output 1750 1445     Net -250 +1691.9            Stool Occurrence  0 x              Physical Exam  Vitals and nursing note reviewed.   Constitutional:       General: She is not in acute distress.  Cardiovascular:      Rate and Rhythm: Normal rate.      Pulses: Normal pulses.   Pulmonary:      Effort: Pulmonary effort is normal. No respiratory distress.   Abdominal:      General: There is distension.      Palpations: Abdomen is soft.      Tenderness: There is no abdominal tenderness.   Neurological:      Mental Status: She is alert.          Significant Labs:  BMP:   Recent Labs   Lab 02/10/24  0504   *   *   K 3.0*      CO2 32*   BUN 11   CREATININE 1.0   CALCIUM 8.3*   MG 1.7     CBC:   Recent Labs   Lab 02/10/24  0504   WBC 6.65   RBC 3.29*   HGB 9.3*   HCT 31.9*      MCV 97   MCH 28.3   MCHC 29.2*     CRP (Last 3 Results):   Recent Labs   Lab 02/09/24  0420 02/10/24  0504   .4* 65.6*       Significant Diagnostics:  I have reviewed all pertinent imaging results/findings within the past 24 hours.

## 2024-02-10 NOTE — PROGRESS NOTES
[facility]  Colorectal Surgery  Progress Note    Patient Name: Edita Riley  MRN: [id number]  Admission Date: 2/6/2024  Hospital Length of Stay: 4 days  Attending Physician: Leslie Balbuena MD    Subjective:     Interval History: No acute issues overnight. Nauseous this am, distended on exam. Had sips of clears. BM yesterday, no flatus/BM today. Pain not well controlled this am    Post-Op Info:  Procedure(s) (LRB):  REVISION, ANASTOMOSIS, URETEROILEAL (N/A)  OPEN LAPAROTOMY, EXPLORATORY (N/A)  CLOSURE, ILEOSTOMY (N/A)  ANASTOMOSIS, INTESTINE - Ileocolic anastomosis (N/A)  EXCISION, SMALL INTESTINE  LYSIS, ADHESIONS  LYSIS, ADHESIONS   4 Days Post-Op      Medications:  Continuous Infusions:   lactated ringers 125 mL/hr at 02/10/24 0008    TPN ADULT CENTRAL LINE CUSTOM 50 mL/hr at 02/09/24 2147     Scheduled Meds:   acetaminophen  1,000 mg Oral Q8H    cetirizine  10 mg Oral Daily    ertapenem (INVanz) IV (PEDS and ADULTS)  1 g Intravenous Q24H    heparin (porcine)  5,000 Units Subcutaneous Q8H    lipid (SMOFLIPID)  250 mL Intravenous Daily    methocarbamoL  500 mg Oral QID    mirtazapine  30 mg Oral Nightly    pregabalin  150 mg Oral QHS    sodium bicarbonate  1,300 mg Oral BID    sodium chloride 0.9%  10 mL Intravenous Q6H     PRN Meds:   HYDROmorphone    LIDOcaine HCL 10 mg/ml (1%)    melatonin    ondansetron    oxyCODONE    oxyCODONE    prochlorperazine    sodium chloride 0.9%    sodium chloride 0.9%        Objective:     Vital Signs (Most Recent):  Temp: 98 °F (36.7 °C) (02/10/24 0813)  Pulse: 91 (02/10/24 0813)  Resp: 18 (02/10/24 0813)  BP: (!) 157/69 (02/10/24 0813)  SpO2: 96 % (02/10/24 0813) Vital Signs (24h Range):  Temp:  [97.5 °F (36.4 °C)-98.2 °F (36.8 °C)] 98 °F (36.7 °C)  Pulse:  [68-91] 91  Resp:  [16-20] 18  SpO2:  [95 %-98 %] 96 %  BP: (122-157)/(58-84) 157/69     Intake/Output - Last 3 Shifts         02/08 0700  02/09 0659 02/09 0700  02/10 0659 02/10 0700  02/11 0659    P.O.  330      I.V. (mL/kg) 1500 (18.3) 1500 (18.3)     NG/GT       IV Piggyback  249.9     TPN  1057     Total Intake(mL/kg) 1500 (18.3) 3136.9 (38.3)     Urine (mL/kg/hr) 1000 (0.5) 1280 (0.7)     Drains 750 15     Stool  150     Total Output 1750 1445     Net -250 +1691.9            Stool Occurrence  0 x              Physical Exam  Vitals and nursing note reviewed.   Constitutional:       General: She is not in acute distress.  Cardiovascular:      Rate and Rhythm: Normal rate.      Pulses: Normal pulses.   Pulmonary:      Effort: Pulmonary effort is normal. No respiratory distress.   Abdominal:      General: There is distension.      Palpations: Abdomen is soft.      Tenderness: There is no abdominal tenderness.   Neurological:      Mental Status: She is alert.          Significant Labs:  BMP:   Recent Labs   Lab 02/10/24  0504   *   *   K 3.0*      CO2 32*   BUN 11   CREATININE 1.0   CALCIUM 8.3*   MG 1.7     CBC:   Recent Labs   Lab 02/10/24  0504   WBC 6.65   RBC 3.29*   HGB 9.3*   HCT 31.9*      MCV 97   MCH 28.3   MCHC 29.2*     CRP (Last 3 Results):   Recent Labs   Lab 02/09/24  0420 02/10/24  0504   .4* 65.6*       Significant Diagnostics:  I have reviewed all pertinent imaging results/findings within the past 24 hours.  Assessment/Plan:     Cervical cancer  Edita Hardin [facility] is a 59 y.o. year old female with history of ureteral stenosis after radiation for cervical cancer requiring urostomy (transverse colon conduit) complicated by anastomotic leak requiring right colectomy and end ileostomy now s/p ex lap, extensive maliha, revision of urostomy, small bowel resection, ileostomy reversal w/ ileosigmoid anastomosis on 2/6    Slow return of bowel function, nauseous and distended this am. Pain minimally controlled. BM yesterday, no flatus/BM today.      Plan:  Prn anti-emetics  Would recommend sips of clears, NPO if worsening nausea/distension   PCA with minimal intake for better  pain control   Ambulate, IS  Trending CRP - 65 (114)  Rest per primary          Bria Smith MD  Colorectal Surgery  [facility]

## 2024-02-10 NOTE — ASSESSMENT & PLAN NOTE
Edita Riley is a 59 y.o. year old female with history of ureteral stenosis after radiation for cervical cancer requiring urostomy (transverse colon conduit) complicated by anastomotic leak requiring right colectomy and end ileostomy now s/p ex lap, extensive maliha, revision of urostomy, small bowel resection, ileostomy reversal w/ ileosigmoid anastomosis on 2/6    Slow return of bowel function, nauseous and distended this am. Pain minimally controlled. BM yesterday, no flatus/BM today.      Plan:  Prn anti-emetics  Would recommend NPO, PCA with minimal intake for better pain control   Ambulate, IS  Trending CRP  Rest per primary

## 2024-02-10 NOTE — PROGRESS NOTES
Urology Progress Note    Patient seen and examined this afternoon. She reports 1 episode of vomiting a small amount just prior to my seeing her. I discussed with her that I would recommend discontinuation of her CLD and placement of a nasogastric tube. She stated she refused to have an NG tube placed. She also immediately picked up a can of Sprite and drank what was left in the can, which appeared to be full.     I discussed with her that refusing an NGT could result in continued vomiting, aspiration, aspiration pneumonitis or pneumonia, respiratory failure, sepsis, and death. We discussed this multiple times. She expressed understanding and continued to refuse NGT placement.    Upon further discussion with her nurse, I was informed that she had vomited approximately 300 ccs of bilious appearing fluid at 8:30 a.m. this morning and again at 3 p.m.    Plan:     -Diet NPO  -KUB  -Continue TPN  -Will supplement K replacement with IV replacement as patient had not received PO replacement ordered earlier today  -Rest of care as previously stated in progress note today    James Guzman MD  Urology PGY-2  [facility]

## 2024-02-10 NOTE — PROGRESS NOTES
[facility]  Urology  Progress Note    Patient Name: Edita Riley  MRN: [id number]  Admission Date: 2/6/2024  Hospital Length of Stay: 4 days  Code Status: Full Code   Attending Provider: Leslie Balbuena MD   Primary Care Physician: Trina Vu MD    Subjective:     HPI:  Edita Riley is a 58 y.o. female who presents with a history of radiation therapy for cervical cancer which was complicated by bilateral distal ureteral obstruction with bilateral hydronephrosis.  She is status post colon conduit which was performed in 2020.  This was complicated by large bowel perforation away from the anastamosis.  She has an ileostomy and a left percutaneous nephrostomy that was last exchanged on 10/26/2023.  She was admitted to the hospital on 12/7/2023 after her percutaneous nephrostomy tube was clogged and she had been feeling ill.  She was scheduled for revision but missed her appointment with Dr. Reynolds and had to be rescheduled.     Interval History: NAEON. AFVSS. Some mild nausea, no emesis. Tolerating CLD since yesterday, reports drinking 2-3 cups. Reports some mild abdominal pain this am. Ambulating hallway x 2.         Objective:     Temp:  [97.5 °F (36.4 °C)-98.2 °F (36.8 °C)] 98.2 °F (36.8 °C)  Pulse:  [68-91] 85  Resp:  [16-20] 18  SpO2:  [95 %-98 %] 95 %  BP: (123-157)/(58-84) 127/72     Body mass index is 26.7 kg/m².           Drains       Drain  Duration                  Suprapubic Catheter 100% silicone 16 Fr. -- days         Nephrostomy 02/02/24 1255 Left 12 Fr. 5 days         Closed/Suction Drain 02/06/24 Tube - 1 Midline Abdomen Bulb 15 Fr. 2 days         NG/OG Tube 02/06/24 0748 nasogastric 1 day         Urostomy 02/06/24 1815 ureterostomy, left 1 day         NG/OG Tube 02/07/24 1036 nasogastric 16 Fr. Left nostril <1 day                     Physical Exam  Constitutional:       General: She is not in acute distress.     Appearance: Normal appearance.   HENT:      Head:  Normocephalic and atraumatic.      Nose: Nose normal.   Eyes:      Conjunctiva/sclera: Conjunctivae normal.   Cardiovascular:      Rate and Rhythm: Normal rate.   Pulmonary:      Effort: Pulmonary effort is normal. No respiratory distress.   Abdominal:      Palpations: Abdomen is soft.      Comments: Incision CDI  Abdomen appropriately ttp. Soft, non-distended.   Urostomy in place, stent appears appropriately placed, urostomy with CYU output, no surrounding erythema/signs of dehiscence/signs of infection  Prior ileostomy site with packing in place, no significant discharge or purulence  L NT in place with cyu  ORESTES in place w/ minimal ss output    Musculoskeletal:         General: No deformity.   Skin:     General: Skin is warm and dry.   Neurological:      General: No focal deficit present.      Mental Status: She is alert.   Psychiatric:         Mood and Affect: Mood normal.         Behavior: Behavior normal.           Significant Labs:    BMP:  Recent Labs   Lab 02/08/24  0657 02/09/24  0420 02/10/24  0504    146* 146*   K 3.2* 2.8* 3.0*   * 109 106   CO2 24 31* 32*   BUN 13 12 11   CREATININE 1.1 1.2 1.0   CALCIUM 8.5* 8.6* 8.3*         CBC:   Recent Labs   Lab 02/08/24  0657 02/09/24  0420 02/10/24  0504   WBC 11.77 8.70 6.65   HGB 9.9* 9.7* 9.3*   HCT 31.6* 32.3* 31.9*    203 236         All pertinent labs results from the past 24 hours have been reviewed.    Significant Imaging:  All pertinent imaging results/findings from the past 24 hours have been reviewed.        Assessment/Plan:     Ileostomy in place  Edita Hardin [facility] is a 59 y.o. female with a history of cervical cancer s/p XRT complicated by end-stage bladder and distal ureteral strictures. She underwent urinary diversion with a transverse colon conduit in 2020 complicated by a bowel perforation resulting in an ileostomy creation for GI diversion. She is now s/p surgical repair of her left ureterocolic anastomotic stricture  and ileostomy reversal on 2/6/24.     Now POD4    - Pain - MMPC  - Diet - continue CLD for now  - Low threshold to make NPO if nausea/emesis occurs  - mIVF: LR @ 125 mL/hr   - maintain drains   - Nausea control w/ PRN antiemetics   - OOB, ambulate 5x daily    - PT/OT  - Encourage IS  - DVT ppx: sqh   - daily labs  - restarting home meds as appropriate   - Reordering TPN  - Discussed with pharmacy regarding electrolytes, will replete K and Pharmacy will adjust lyte replacement via TPN    - Dispo: continue inpatient care           VTE Risk Mitigation (From admission, onward)           Ordered     heparin (porcine) injection 5,000 Units  Every 8 hours         02/06/24 1725     Place ANANT hose  Until discontinued         02/06/24 0526     Place sequential compression device  Until discontinued         02/06/24 0526                    Karina Beverly MD  Urology  [facility]

## 2024-02-11 LAB
ANION GAP SERPL CALC-SCNC: 5 MMOL/L (ref 8–16)
BASOPHILS # BLD AUTO: 0.02 K/UL (ref 0–0.2)
BASOPHILS NFR BLD: 0.3 % (ref 0–1.9)
BUN SERPL-MCNC: 12 MG/DL (ref 8–23)
CALCIUM SERPL-MCNC: 8.4 MG/DL (ref 8.7–10.5)
CHLORIDE SERPL-SCNC: 105 MMOL/L (ref 95–110)
CO2 SERPL-SCNC: 35 MMOL/L (ref 23–29)
CREAT SERPL-MCNC: 0.9 MG/DL (ref 0.5–1.4)
CRP SERPL-MCNC: 46.7 MG/L (ref 0–8.2)
DIFFERENTIAL METHOD BLD: ABNORMAL
EOSINOPHIL # BLD AUTO: 0.4 K/UL (ref 0–0.5)
EOSINOPHIL NFR BLD: 4.9 % (ref 0–8)
ERYTHROCYTE [DISTWIDTH] IN BLOOD BY AUTOMATED COUNT: 15.6 % (ref 11.5–14.5)
EST. GFR  (NO RACE VARIABLE): >60 ML/MIN/1.73 M^2
GLUCOSE SERPL-MCNC: 123 MG/DL (ref 70–110)
HCT VFR BLD AUTO: 28.6 % (ref 37–48.5)
HGB BLD-MCNC: 8.4 G/DL (ref 12–16)
IMM GRANULOCYTES # BLD AUTO: 0.05 K/UL (ref 0–0.04)
IMM GRANULOCYTES NFR BLD AUTO: 0.7 % (ref 0–0.5)
LYMPHOCYTES # BLD AUTO: 1.5 K/UL (ref 1–4.8)
LYMPHOCYTES NFR BLD: 19.1 % (ref 18–48)
MAGNESIUM SERPL-MCNC: 1.8 MG/DL (ref 1.6–2.6)
MCH RBC QN AUTO: 28.1 PG (ref 27–31)
MCHC RBC AUTO-ENTMCNC: 29.4 G/DL (ref 32–36)
MCV RBC AUTO: 96 FL (ref 82–98)
MONOCYTES # BLD AUTO: 0.9 K/UL (ref 0.3–1)
MONOCYTES NFR BLD: 12.2 % (ref 4–15)
NEUTROPHILS # BLD AUTO: 4.8 K/UL (ref 1.8–7.7)
NEUTROPHILS NFR BLD: 62.8 % (ref 38–73)
NRBC BLD-RTO: 0 /100 WBC
PHOSPHATE SERPL-MCNC: 1.8 MG/DL (ref 2.7–4.5)
PLATELET # BLD AUTO: 217 K/UL (ref 150–450)
PMV BLD AUTO: 9.5 FL (ref 9.2–12.9)
POTASSIUM SERPL-SCNC: 3.4 MMOL/L (ref 3.5–5.1)
RBC # BLD AUTO: 2.99 M/UL (ref 4–5.4)
SODIUM SERPL-SCNC: 145 MMOL/L (ref 136–145)
WBC # BLD AUTO: 7.68 K/UL (ref 3.9–12.7)

## 2024-02-11 PROCEDURE — 25000003 PHARM REV CODE 250

## 2024-02-11 PROCEDURE — 63600175 PHARM REV CODE 636 W HCPCS: Performed by: STUDENT IN AN ORGANIZED HEALTH CARE EDUCATION/TRAINING PROGRAM

## 2024-02-11 PROCEDURE — 84100 ASSAY OF PHOSPHORUS: CPT | Performed by: STUDENT IN AN ORGANIZED HEALTH CARE EDUCATION/TRAINING PROGRAM

## 2024-02-11 PROCEDURE — 25000003 PHARM REV CODE 250: Performed by: PHYSICIAN ASSISTANT

## 2024-02-11 PROCEDURE — 63600175 PHARM REV CODE 636 W HCPCS: Performed by: PHYSICIAN ASSISTANT

## 2024-02-11 PROCEDURE — 80048 BASIC METABOLIC PNL TOTAL CA: CPT | Performed by: STUDENT IN AN ORGANIZED HEALTH CARE EDUCATION/TRAINING PROGRAM

## 2024-02-11 PROCEDURE — A4217 STERILE WATER/SALINE, 500 ML: HCPCS

## 2024-02-11 PROCEDURE — 85025 COMPLETE CBC W/AUTO DIFF WBC: CPT | Performed by: STUDENT IN AN ORGANIZED HEALTH CARE EDUCATION/TRAINING PROGRAM

## 2024-02-11 PROCEDURE — 25000003 PHARM REV CODE 250: Performed by: UROLOGY

## 2024-02-11 PROCEDURE — 83735 ASSAY OF MAGNESIUM: CPT | Performed by: STUDENT IN AN ORGANIZED HEALTH CARE EDUCATION/TRAINING PROGRAM

## 2024-02-11 PROCEDURE — 63600175 PHARM REV CODE 636 W HCPCS

## 2024-02-11 PROCEDURE — B4185 PARENTERAL SOL 10 GM LIPIDS: HCPCS

## 2024-02-11 PROCEDURE — 20600001 HC STEP DOWN PRIVATE ROOM

## 2024-02-11 PROCEDURE — A4216 STERILE WATER/SALINE, 10 ML: HCPCS | Performed by: UROLOGY

## 2024-02-11 PROCEDURE — 63600175 PHARM REV CODE 636 W HCPCS: Mod: JZ,JG

## 2024-02-11 PROCEDURE — 86140 C-REACTIVE PROTEIN: CPT | Performed by: STUDENT IN AN ORGANIZED HEALTH CARE EDUCATION/TRAINING PROGRAM

## 2024-02-11 RX ORDER — POTASSIUM CHLORIDE 7.45 MG/ML
10 INJECTION INTRAVENOUS
Status: COMPLETED | OUTPATIENT
Start: 2024-02-11 | End: 2024-02-11

## 2024-02-11 RX ORDER — ACETAMINOPHEN 10 MG/ML
1000 INJECTION, SOLUTION INTRAVENOUS EVERY 8 HOURS
Status: DISCONTINUED | OUTPATIENT
Start: 2024-02-11 | End: 2024-02-12

## 2024-02-11 RX ORDER — MAGNESIUM SULFATE HEPTAHYDRATE 40 MG/ML
2 INJECTION, SOLUTION INTRAVENOUS ONCE
Status: COMPLETED | OUTPATIENT
Start: 2024-02-11 | End: 2024-02-11

## 2024-02-11 RX ADMIN — HEPARIN SODIUM 5000 UNITS: 5000 INJECTION INTRAVENOUS; SUBCUTANEOUS at 01:02

## 2024-02-11 RX ADMIN — HYDROMORPHONE HYDROCHLORIDE 1 MG: 1 INJECTION, SOLUTION INTRAMUSCULAR; INTRAVENOUS; SUBCUTANEOUS at 08:02

## 2024-02-11 RX ADMIN — SMOFLIPID 250 ML: 6; 6; 5; 3 INJECTION, EMULSION INTRAVENOUS at 09:02

## 2024-02-11 RX ADMIN — POTASSIUM CHLORIDE 10 MEQ: 7.46 INJECTION, SOLUTION INTRAVENOUS at 07:02

## 2024-02-11 RX ADMIN — Medication 10 ML: at 12:02

## 2024-02-11 RX ADMIN — ACETAMINOPHEN 1000 MG: 10 INJECTION, SOLUTION INTRAVENOUS at 02:02

## 2024-02-11 RX ADMIN — POTASSIUM CHLORIDE 10 MEQ: 7.46 INJECTION, SOLUTION INTRAVENOUS at 10:02

## 2024-02-11 RX ADMIN — MAGNESIUM SULFATE HEPTAHYDRATE: 500 INJECTION, SOLUTION INTRAMUSCULAR; INTRAVENOUS at 09:02

## 2024-02-11 RX ADMIN — Medication 10 ML: at 06:02

## 2024-02-11 RX ADMIN — METHOCARBAMOL 500 MG: 100 INJECTION, SOLUTION INTRAMUSCULAR; INTRAVENOUS at 06:02

## 2024-02-11 RX ADMIN — METHOCARBAMOL 500 MG: 100 INJECTION, SOLUTION INTRAMUSCULAR; INTRAVENOUS at 11:02

## 2024-02-11 RX ADMIN — SODIUM CHLORIDE, POTASSIUM CHLORIDE, SODIUM LACTATE AND CALCIUM CHLORIDE: 600; 310; 30; 20 INJECTION, SOLUTION INTRAVENOUS at 04:02

## 2024-02-11 RX ADMIN — POTASSIUM CHLORIDE 10 MEQ: 7.46 INJECTION, SOLUTION INTRAVENOUS at 04:02

## 2024-02-11 RX ADMIN — SODIUM CHLORIDE, POTASSIUM CHLORIDE, SODIUM LACTATE AND CALCIUM CHLORIDE: 600; 310; 30; 20 INJECTION, SOLUTION INTRAVENOUS at 03:02

## 2024-02-11 RX ADMIN — HEPARIN SODIUM 5000 UNITS: 5000 INJECTION INTRAVENOUS; SUBCUTANEOUS at 05:02

## 2024-02-11 RX ADMIN — POTASSIUM PHOSPHATE, MONOBASIC AND POTASSIUM PHOSPHATE, DIBASIC 15 MMOL: 224; 236 INJECTION, SOLUTION, CONCENTRATE INTRAVENOUS at 04:02

## 2024-02-11 RX ADMIN — POTASSIUM CHLORIDE 10 MEQ: 7.46 INJECTION, SOLUTION INTRAVENOUS at 05:02

## 2024-02-11 RX ADMIN — POTASSIUM CHLORIDE 10 MEQ: 7.46 INJECTION, SOLUTION INTRAVENOUS at 12:02

## 2024-02-11 RX ADMIN — HEPARIN SODIUM 5000 UNITS: 5000 INJECTION INTRAVENOUS; SUBCUTANEOUS at 09:02

## 2024-02-11 RX ADMIN — POTASSIUM CHLORIDE 10 MEQ: 7.46 INJECTION, SOLUTION INTRAVENOUS at 06:02

## 2024-02-11 RX ADMIN — ERTAPENEM 1 G: 1 INJECTION INTRAMUSCULAR; INTRAVENOUS at 02:02

## 2024-02-11 RX ADMIN — ACETAMINOPHEN 1000 MG: 10 INJECTION, SOLUTION INTRAVENOUS at 10:02

## 2024-02-11 RX ADMIN — METHOCARBAMOL 500 MG: 100 INJECTION, SOLUTION INTRAMUSCULAR; INTRAVENOUS at 05:02

## 2024-02-11 RX ADMIN — POTASSIUM CHLORIDE 10 MEQ: 7.46 INJECTION, SOLUTION INTRAVENOUS at 01:02

## 2024-02-11 RX ADMIN — ACETAMINOPHEN 1000 MG: 10 INJECTION, SOLUTION INTRAVENOUS at 05:02

## 2024-02-11 RX ADMIN — POTASSIUM CHLORIDE 10 MEQ: 7.46 INJECTION, SOLUTION INTRAVENOUS at 02:02

## 2024-02-11 RX ADMIN — MAGNESIUM SULFATE HEPTAHYDRATE 2 G: 40 INJECTION, SOLUTION INTRAVENOUS at 01:02

## 2024-02-11 NOTE — PROGRESS NOTES
[facility]  Colorectal Surgery  Progress Note    Patient Name: Edita Riley  MRN: [id number]  Admission Date: 2/6/2024  Hospital Length of Stay: 5 days  Attending Physician: Leslie Balbuena MD    Subjective:     Interval History: No acute issues overnight. Feeling a little better this am, passing BM/flatus. Emesis yesterday. Pain better controlled    Post-Op Info:  Procedure(s) (LRB):  REVISION, ANASTOMOSIS, URETEROILEAL (N/A)  OPEN LAPAROTOMY, EXPLORATORY (N/A)  CLOSURE, ILEOSTOMY (N/A)  ANASTOMOSIS, INTESTINE - Ileocolic anastomosis (N/A)  EXCISION, SMALL INTESTINE  LYSIS, ADHESIONS  LYSIS, ADHESIONS   5 Days Post-Op      Medications:  Continuous Infusions:   lactated ringers 125 mL/hr at 02/11/24 0455    TPN ADULT CENTRAL LINE CUSTOM 50 mL/hr at 02/10/24 2158    TPN ADULT CENTRAL LINE CUSTOM       Scheduled Meds:   acetaminophen  1,000 mg Intravenous Q8H    ertapenem (INVanz) IV (PEDS and ADULTS)  1 g Intravenous Q24H    heparin (porcine)  5,000 Units Subcutaneous Q8H    lipid (SMOFLIPID)  250 mL Intravenous Daily    magnesium sulfate IVPB  2 g Intravenous Once    methocarbamol (ROBAXIN) IVPB  500 mg Intravenous Q6H    potassium chloride  10 mEq Intravenous Q1H    potassium phosphate IVPB  15 mmol Intravenous Once    sodium chloride 0.9%  10 mL Intravenous Q6H     PRN Meds:   HYDROmorphone    LIDOcaine HCL 10 mg/ml (1%)    melatonin    ondansetron    oxyCODONE    oxyCODONE    prochlorperazine    sodium chloride 0.9%    sodium chloride 0.9%        Objective:     Vital Signs (Most Recent):  Temp: 98.3 °F (36.8 °C) (02/11/24 1137)  Pulse: 87 (02/11/24 1137)  Resp: 18 (02/11/24 1137)  BP: 125/61 (02/11/24 1137)  SpO2: (!) 94 % (02/11/24 1137) Vital Signs (24h Range):  Temp:  [97.5 °F (36.4 °C)-98.6 °F (37 °C)] 98.3 °F (36.8 °C)  Pulse:  [81-96] 87  Resp:  [18-20] 18  SpO2:  [94 %-95 %] 94 %  BP: (107-138)/(57-66) 125/61     Intake/Output - Last 3 Shifts         02/09 0700  02/10 0659 02/10  0700  02/11 0659 02/11 0700  02/12 0659    P.O. 330 0     I.V. (mL/kg) 1500 (18.3) 1375 (16.8)     IV Piggyback 249.9      TPN 1057      Total Intake(mL/kg) 3136.9 (38.3) 1375 (16.8)     Urine (mL/kg/hr) 1280 (0.7) 1400 (0.7)     Emesis/NG output  300     Drains 15 10     Stool 150      Total Output 1445 1710     Net +1691.9 -335            Stool Occurrence 0 x               Physical Exam  Vitals and nursing note reviewed.   Constitutional:       General: She is not in acute distress.  Cardiovascular:      Rate and Rhythm: Normal rate.      Pulses: Normal pulses.   Pulmonary:      Effort: Pulmonary effort is normal. No respiratory distress.   Abdominal:      General: There is distension.      Palpations: Abdomen is soft.      Tenderness: There is no abdominal tenderness.   Neurological:      Mental Status: She is alert.          Significant Labs:  BMP:   Recent Labs   Lab 02/11/24  0501   *      K 3.4*      CO2 35*   BUN 12   CREATININE 0.9   CALCIUM 8.4*   MG 1.8       CBC:   Recent Labs   Lab 02/11/24  0501   WBC 7.68   RBC 2.99*   HGB 8.4*   HCT 28.6*      MCV 96   MCH 28.1   MCHC 29.4*       CRP (Last 3 Results):   Recent Labs   Lab 02/09/24  0420 02/10/24  0504 02/11/24  0501   .4* 65.6* 46.7*         Significant Diagnostics:  I have reviewed all pertinent imaging results/findings within the past 24 hours.  Assessment/Plan:     Cervical cancer  Edita Riley is a 59 y.o. year old female with history of ureteral stenosis after radiation for cervical cancer requiring urostomy (transverse colon conduit) complicated by anastomotic leak requiring right colectomy and end ileostomy now s/p ex lap, extensive maliha, revision of urostomy, small bowel resection, ileostomy reversal w/ ileosigmoid anastomosis on 2/6    Awaiting definitive return of bowel function.      Plan:  Prn anti-emetics  Would recommend NPO, PCA with minimal intake for better pain control   Ambulate,  IS  Trending CRP  Rest per primary          Bria Smith MD  Colorectal Surgery  [facility]

## 2024-02-11 NOTE — SUBJECTIVE & OBJECTIVE
Interval History: NAEO. AFVSS. Vomiting yesterday. Refused NGT. No N/V overnight. Drain SS. Nephrostomy tube and urostomy clear yellow. Passing gas, no BM. Belching. Walked x2 yesterday.    Objective:     Temp:  [97.5 °F (36.4 °C)-98.6 °F (37 °C)] 98.3 °F (36.8 °C)  Pulse:  [81-96] 87  Resp:  [18-20] 18  SpO2:  [94 %-95 %] 94 %  BP: (107-138)/(57-66) 125/61     Body mass index is 26.7 kg/m².           Drains       Drain  Duration                  Suprapubic Catheter 100% silicone 16 Fr. -- days         Nephrostomy 02/02/24 1255 Left 12 Fr. 8 days         Closed/Suction Drain 02/06/24 Tube - 1 Midline Abdomen Bulb 15 Fr. 5 days         Urostomy 02/06/24 1815 ureterostomy, left 4 days                     Physical Exam  Constitutional:       General: She is not in acute distress.     Appearance: Normal appearance.   HENT:      Head: Normocephalic and atraumatic.      Nose: Nose normal.   Eyes:      Conjunctiva/sclera: Conjunctivae normal.   Cardiovascular:      Rate and Rhythm: Normal rate.   Pulmonary:      Effort: Pulmonary effort is normal. No respiratory distress.   Abdominal:      Palpations: Abdomen is soft.      Comments: Incision CDI  Abdomen appropriately ttp. Soft, non-distended.   Urostomy in place, stent appears appropriately placed, urostomy with CYU output, no surrounding erythema/signs of dehiscence/signs of infection  Prior ileostomy site with packing in place, no significant discharge or purulence  L NT in place with cyu  ORESTES in place w/ minimal ss output    Musculoskeletal:         General: No deformity.   Skin:     General: Skin is warm and dry.   Neurological:      General: No focal deficit present.      Mental Status: She is alert.   Psychiatric:         Mood and Affect: Mood normal.         Behavior: Behavior normal.           Significant Labs:    BMP:  Recent Labs   Lab 02/09/24  0420 02/10/24  0504 02/11/24  0501   * 146* 145   K 2.8* 3.0* 3.4*    106 105   CO2 31* 32* 35*   BUN 12 11  "12   CREATININE 1.2 1.0 0.9   CALCIUM 8.6* 8.3* 8.4*       CBC:   Recent Labs   Lab 02/09/24  0420 02/10/24  0504 02/11/24  0501   WBC 8.70 6.65 7.68   HGB 9.7* 9.3* 8.4*   HCT 32.3* 31.9* 28.6*    236 217       Urine Studies: No results for input(s): "COLORU", "APPEARANCEUA", "PHUR", "SPECGRAV", "PROTEINUA", "GLUCUA", "KETONESU", "BILIRUBINUA", "OCCULTUA", "NITRITE", "UROBILINOGEN", "LEUKOCYTESUR", "RBCUA", "WBCUA", "BACTERIA", "SQUAMEPITHEL", "HYALINECASTS" in the last 168 hours.    Invalid input(s): "WRIGHTSUR"  All pertinent labs results from the past 24 hours have been reviewed.    Significant Imaging:  All pertinent imaging results/findings from the past 24 hours have been reviewed.    "

## 2024-02-11 NOTE — PLAN OF CARE
Problem: Adult Inpatient Plan of Care  Goal: Plan of Care Review  Outcome: Ongoing, Progressing  Goal: Patient-Specific Goal (Individualized)  Outcome: Ongoing, Progressing  Goal: Absence of Hospital-Acquired Illness or Injury  Outcome: Ongoing, Progressing  Goal: Optimal Comfort and Wellbeing  Outcome: Ongoing, Progressing  Goal: Readiness for Transition of Care  Outcome: Ongoing, Progressing     Problem: Fluid and Electrolyte Imbalance (Acute Kidney Injury/Impairment)  Goal: Fluid and Electrolyte Balance  Outcome: Ongoing, Progressing     Problem: Oral Intake Inadequate (Acute Kidney Injury/Impairment)  Goal: Optimal Nutrition Intake  Outcome: Ongoing, Progressing     Problem: Renal Function Impairment (Acute Kidney Injury/Impairment)  Goal: Effective Renal Function  Outcome: Ongoing, Progressing     Problem: Impaired Wound Healing  Goal: Optimal Wound Healing  Outcome: Ongoing, Progressing     Problem: Fall Injury Risk  Goal: Absence of Fall and Fall-Related Injury  Outcome: Ongoing, Progressing     Problem: Infection  Goal: Absence of Infection Signs and Symptoms  Outcome: Ongoing, Progressing  [facility] Plan of Care Note  Dx Final diagnoses:  [N13.30] Hydronephrosis of left kidney     Shift Events 2 episodes of vomiting. Made npo     Neuro: aox4    Vital Signs: wnl    Respiratory: wnl    Diet: npo    Urine Output/Bowel Movement: per urostomy and nephrostomy/ no bm

## 2024-02-11 NOTE — SUBJECTIVE & OBJECTIVE
Subjective:     Interval History: No acute issues overnight. Feeling a little better this am, passing BM/flatus. Emesis yesterday. Pain better controlled    Post-Op Info:  Procedure(s) (LRB):  REVISION, ANASTOMOSIS, URETEROILEAL (N/A)  OPEN LAPAROTOMY, EXPLORATORY (N/A)  CLOSURE, ILEOSTOMY (N/A)  ANASTOMOSIS, INTESTINE - Ileocolic anastomosis (N/A)  EXCISION, SMALL INTESTINE  LYSIS, ADHESIONS  LYSIS, ADHESIONS   5 Days Post-Op      Medications:  Continuous Infusions:   lactated ringers 125 mL/hr at 02/11/24 0455    TPN ADULT CENTRAL LINE CUSTOM 50 mL/hr at 02/10/24 2158    TPN ADULT CENTRAL LINE CUSTOM       Scheduled Meds:   acetaminophen  1,000 mg Intravenous Q8H    ertapenem (INVanz) IV (PEDS and ADULTS)  1 g Intravenous Q24H    heparin (porcine)  5,000 Units Subcutaneous Q8H    lipid (SMOFLIPID)  250 mL Intravenous Daily    magnesium sulfate IVPB  2 g Intravenous Once    methocarbamol (ROBAXIN) IVPB  500 mg Intravenous Q6H    potassium chloride  10 mEq Intravenous Q1H    potassium phosphate IVPB  15 mmol Intravenous Once    sodium chloride 0.9%  10 mL Intravenous Q6H     PRN Meds:   HYDROmorphone    LIDOcaine HCL 10 mg/ml (1%)    melatonin    ondansetron    oxyCODONE    oxyCODONE    prochlorperazine    sodium chloride 0.9%    sodium chloride 0.9%        Objective:     Vital Signs (Most Recent):  Temp: 98.3 °F (36.8 °C) (02/11/24 1137)  Pulse: 87 (02/11/24 1137)  Resp: 18 (02/11/24 1137)  BP: 125/61 (02/11/24 1137)  SpO2: (!) 94 % (02/11/24 1137) Vital Signs (24h Range):  Temp:  [97.5 °F (36.4 °C)-98.6 °F (37 °C)] 98.3 °F (36.8 °C)  Pulse:  [81-96] 87  Resp:  [18-20] 18  SpO2:  [94 %-95 %] 94 %  BP: (107-138)/(57-66) 125/61     Intake/Output - Last 3 Shifts         02/09 0700  02/10 0659 02/10 0700 02/11 0659 02/11 0700  02/12 0659    P.O. 330 0     I.V. (mL/kg) 1500 (18.3) 1375 (16.8)     IV Piggyback 249.9      TPN 1057      Total Intake(mL/kg) 3136.9 (38.3) 1375 (16.8)     Urine (mL/kg/hr) 1280 (0.7) 1400  (0.7)     Emesis/NG output  300     Drains 15 10     Stool 150      Total Output 1445 1710     Net +1691.9 -335            Stool Occurrence 0 x               Physical Exam  Vitals and nursing note reviewed.   Constitutional:       General: She is not in acute distress.  Cardiovascular:      Rate and Rhythm: Normal rate.      Pulses: Normal pulses.   Pulmonary:      Effort: Pulmonary effort is normal. No respiratory distress.   Abdominal:      General: There is distension.      Palpations: Abdomen is soft.      Tenderness: There is no abdominal tenderness.   Neurological:      Mental Status: She is alert.          Significant Labs:  BMP:   Recent Labs   Lab 02/11/24  0501   *      K 3.4*      CO2 35*   BUN 12   CREATININE 0.9   CALCIUM 8.4*   MG 1.8       CBC:   Recent Labs   Lab 02/11/24  0501   WBC 7.68   RBC 2.99*   HGB 8.4*   HCT 28.6*      MCV 96   MCH 28.1   MCHC 29.4*       CRP (Last 3 Results):   Recent Labs   Lab 02/09/24  0420 02/10/24  0504 02/11/24  0501   .4* 65.6* 46.7*         Significant Diagnostics:  I have reviewed all pertinent imaging results/findings within the past 24 hours.

## 2024-02-11 NOTE — ASSESSMENT & PLAN NOTE
Edita Riley is a 59 y.o. year old female with history of ureteral stenosis after radiation for cervical cancer requiring urostomy (transverse colon conduit) complicated by anastomotic leak requiring right colectomy and end ileostomy now s/p ex lap, extensive maliha, revision of urostomy, small bowel resection, ileostomy reversal w/ ileosigmoid anastomosis on 2/6    Awaiting definitive return of bowel function.      Plan:  Prn anti-emetics  Would recommend NPO, PCA with minimal intake for better pain control   Ambulate, IS  Trending CRP  Rest per primary

## 2024-02-11 NOTE — ASSESSMENT & PLAN NOTE
Edita Riley is a 59 y.o. female with a history of cervical cancer s/p XRT complicated by end-stage bladder and distal ureteral strictures. She underwent urinary diversion with a transverse colon conduit in 2020 complicated by a bowel perforation resulting in an ileostomy creation for GI diversion. She is now s/p surgical repair of her left ureterocolic anastomotic stricture and ileostomy reversal on 2/6/24.       - Pain - MMPC  - Diet - NPO; refusing NGT, understands risks  - All meds converted to IV  - Continue TPN, reordered  - mIVF: LR @ 125 mL/hr   - maintain drains   - Nausea control w/ PRN antiemetics   - OOB, ambulate 5x daily    - PT/OT  - Encourage IS  - DVT ppx: sqh   - daily labs  - restarting home meds as appropriate   - Discussed with pharmacy regarding electrolytes, will replete K and Pharmacy will adjust lyte replacement via TPN  - Please include nursing notes for shift, patient may be unreliable historian to surgical team on morning rounds     - Dispo: continue inpatient care

## 2024-02-12 LAB
ANION GAP SERPL CALC-SCNC: 6 MMOL/L (ref 8–16)
BASOPHILS # BLD AUTO: 0.02 K/UL (ref 0–0.2)
BASOPHILS NFR BLD: 0.2 % (ref 0–1.9)
BUN SERPL-MCNC: 11 MG/DL (ref 8–23)
CALCIUM SERPL-MCNC: 8.1 MG/DL (ref 8.7–10.5)
CHLORIDE SERPL-SCNC: 103 MMOL/L (ref 95–110)
CO2 SERPL-SCNC: 30 MMOL/L (ref 23–29)
CREAT SERPL-MCNC: 0.8 MG/DL (ref 0.5–1.4)
CRP SERPL-MCNC: 42.6 MG/L (ref 0–8.2)
DIFFERENTIAL METHOD BLD: ABNORMAL
EOSINOPHIL # BLD AUTO: 0.5 K/UL (ref 0–0.5)
EOSINOPHIL NFR BLD: 5.6 % (ref 0–8)
ERYTHROCYTE [DISTWIDTH] IN BLOOD BY AUTOMATED COUNT: 15.6 % (ref 11.5–14.5)
EST. GFR  (NO RACE VARIABLE): >60 ML/MIN/1.73 M^2
FINAL PATHOLOGIC DIAGNOSIS: NORMAL
GLUCOSE SERPL-MCNC: 115 MG/DL (ref 70–110)
GROSS: NORMAL
HCT VFR BLD AUTO: 29.7 % (ref 37–48.5)
HGB BLD-MCNC: 8.8 G/DL (ref 12–16)
IMM GRANULOCYTES # BLD AUTO: 0.07 K/UL (ref 0–0.04)
IMM GRANULOCYTES NFR BLD AUTO: 0.9 % (ref 0–0.5)
LYMPHOCYTES # BLD AUTO: 1.2 K/UL (ref 1–4.8)
LYMPHOCYTES NFR BLD: 14.7 % (ref 18–48)
Lab: NORMAL
MAGNESIUM SERPL-MCNC: 2.1 MG/DL (ref 1.6–2.6)
MCH RBC QN AUTO: 28 PG (ref 27–31)
MCHC RBC AUTO-ENTMCNC: 29.6 G/DL (ref 32–36)
MCV RBC AUTO: 95 FL (ref 82–98)
MONOCYTES # BLD AUTO: 1.1 K/UL (ref 0.3–1)
MONOCYTES NFR BLD: 13 % (ref 4–15)
NEUTROPHILS # BLD AUTO: 5.3 K/UL (ref 1.8–7.7)
NEUTROPHILS NFR BLD: 65.6 % (ref 38–73)
NRBC BLD-RTO: 0 /100 WBC
PHOSPHATE SERPL-MCNC: 2.3 MG/DL (ref 2.7–4.5)
PLATELET # BLD AUTO: 220 K/UL (ref 150–450)
PMV BLD AUTO: 10 FL (ref 9.2–12.9)
POTASSIUM SERPL-SCNC: 4 MMOL/L (ref 3.5–5.1)
RBC # BLD AUTO: 3.14 M/UL (ref 4–5.4)
SODIUM SERPL-SCNC: 139 MMOL/L (ref 136–145)
WBC # BLD AUTO: 8.1 K/UL (ref 3.9–12.7)

## 2024-02-12 PROCEDURE — A4217 STERILE WATER/SALINE, 500 ML: HCPCS

## 2024-02-12 PROCEDURE — 86140 C-REACTIVE PROTEIN: CPT | Performed by: STUDENT IN AN ORGANIZED HEALTH CARE EDUCATION/TRAINING PROGRAM

## 2024-02-12 PROCEDURE — 63600175 PHARM REV CODE 636 W HCPCS

## 2024-02-12 PROCEDURE — 25000003 PHARM REV CODE 250

## 2024-02-12 PROCEDURE — 97530 THERAPEUTIC ACTIVITIES: CPT | Mod: CQ

## 2024-02-12 PROCEDURE — 25000003 PHARM REV CODE 250: Performed by: UROLOGY

## 2024-02-12 PROCEDURE — 80048 BASIC METABOLIC PNL TOTAL CA: CPT | Performed by: STUDENT IN AN ORGANIZED HEALTH CARE EDUCATION/TRAINING PROGRAM

## 2024-02-12 PROCEDURE — 83735 ASSAY OF MAGNESIUM: CPT | Performed by: STUDENT IN AN ORGANIZED HEALTH CARE EDUCATION/TRAINING PROGRAM

## 2024-02-12 PROCEDURE — 97110 THERAPEUTIC EXERCISES: CPT | Mod: CO

## 2024-02-12 PROCEDURE — 20600001 HC STEP DOWN PRIVATE ROOM

## 2024-02-12 PROCEDURE — 85025 COMPLETE CBC W/AUTO DIFF WBC: CPT | Performed by: STUDENT IN AN ORGANIZED HEALTH CARE EDUCATION/TRAINING PROGRAM

## 2024-02-12 PROCEDURE — 63600175 PHARM REV CODE 636 W HCPCS: Performed by: PHYSICIAN ASSISTANT

## 2024-02-12 PROCEDURE — A4216 STERILE WATER/SALINE, 10 ML: HCPCS | Performed by: UROLOGY

## 2024-02-12 PROCEDURE — 63600175 PHARM REV CODE 636 W HCPCS: Performed by: STUDENT IN AN ORGANIZED HEALTH CARE EDUCATION/TRAINING PROGRAM

## 2024-02-12 PROCEDURE — B4185 PARENTERAL SOL 10 GM LIPIDS: HCPCS

## 2024-02-12 PROCEDURE — 97116 GAIT TRAINING THERAPY: CPT | Mod: CQ

## 2024-02-12 PROCEDURE — 84100 ASSAY OF PHOSPHORUS: CPT | Performed by: STUDENT IN AN ORGANIZED HEALTH CARE EDUCATION/TRAINING PROGRAM

## 2024-02-12 PROCEDURE — 25000003 PHARM REV CODE 250: Performed by: PHYSICIAN ASSISTANT

## 2024-02-12 RX ORDER — ACETAMINOPHEN 10 MG/ML
1000 INJECTION, SOLUTION INTRAVENOUS EVERY 8 HOURS
Status: COMPLETED | OUTPATIENT
Start: 2024-02-12 | End: 2024-02-13

## 2024-02-12 RX ORDER — NALOXONE HCL 0.4 MG/ML
0.02 VIAL (ML) INJECTION
Status: DISCONTINUED | OUTPATIENT
Start: 2024-02-12 | End: 2024-02-12

## 2024-02-12 RX ORDER — MORPHINE SULFATE 1 MG/ML
INJECTION INTRAVENOUS CONTINUOUS
Status: DISCONTINUED | OUTPATIENT
Start: 2024-02-12 | End: 2024-02-12

## 2024-02-12 RX ADMIN — HEPARIN SODIUM 5000 UNITS: 5000 INJECTION INTRAVENOUS; SUBCUTANEOUS at 10:02

## 2024-02-12 RX ADMIN — SODIUM CHLORIDE, POTASSIUM CHLORIDE, SODIUM LACTATE AND CALCIUM CHLORIDE: 600; 310; 30; 20 INJECTION, SOLUTION INTRAVENOUS at 06:02

## 2024-02-12 RX ADMIN — ACETAMINOPHEN 1000 MG: 10 INJECTION, SOLUTION INTRAVENOUS at 10:02

## 2024-02-12 RX ADMIN — ACETAMINOPHEN 1000 MG: 10 INJECTION, SOLUTION INTRAVENOUS at 01:02

## 2024-02-12 RX ADMIN — ERTAPENEM 1 G: 1 INJECTION INTRAMUSCULAR; INTRAVENOUS at 02:02

## 2024-02-12 RX ADMIN — HEPARIN SODIUM 5000 UNITS: 5000 INJECTION INTRAVENOUS; SUBCUTANEOUS at 02:02

## 2024-02-12 RX ADMIN — Medication 10 ML: at 05:02

## 2024-02-12 RX ADMIN — METHOCARBAMOL 500 MG: 100 INJECTION, SOLUTION INTRAMUSCULAR; INTRAVENOUS at 05:02

## 2024-02-12 RX ADMIN — HEPARIN SODIUM 5000 UNITS: 5000 INJECTION INTRAVENOUS; SUBCUTANEOUS at 05:02

## 2024-02-12 RX ADMIN — HYDROMORPHONE HYDROCHLORIDE 1 MG: 1 INJECTION, SOLUTION INTRAMUSCULAR; INTRAVENOUS; SUBCUTANEOUS at 08:02

## 2024-02-12 RX ADMIN — ACETAMINOPHEN 1000 MG: 10 INJECTION, SOLUTION INTRAVENOUS at 05:02

## 2024-02-12 RX ADMIN — METHOCARBAMOL 500 MG: 100 INJECTION, SOLUTION INTRAMUSCULAR; INTRAVENOUS at 12:02

## 2024-02-12 RX ADMIN — SODIUM CHLORIDE, POTASSIUM CHLORIDE, SODIUM LACTATE AND CALCIUM CHLORIDE: 600; 310; 30; 20 INJECTION, SOLUTION INTRAVENOUS at 07:02

## 2024-02-12 RX ADMIN — SMOFLIPID 250 ML: 6; 6; 5; 3 INJECTION, EMULSION INTRAVENOUS at 10:02

## 2024-02-12 RX ADMIN — SODIUM CHLORIDE, POTASSIUM CHLORIDE, SODIUM LACTATE AND CALCIUM CHLORIDE: 600; 310; 30; 20 INJECTION, SOLUTION INTRAVENOUS at 05:02

## 2024-02-12 RX ADMIN — Medication 10 ML: at 12:02

## 2024-02-12 RX ADMIN — MAGNESIUM SULFATE HEPTAHYDRATE: 500 INJECTION, SOLUTION INTRAMUSCULAR; INTRAVENOUS at 10:02

## 2024-02-12 RX ADMIN — SODIUM PHOSPHATE, MONOBASIC, MONOHYDRATE AND SODIUM PHOSPHATE, DIBASIC, ANHYDROUS 15 MMOL: 142; 276 INJECTION, SOLUTION INTRAVENOUS at 11:02

## 2024-02-12 RX ADMIN — HYDROMORPHONE HYDROCHLORIDE 1 MG: 1 INJECTION, SOLUTION INTRAMUSCULAR; INTRAVENOUS; SUBCUTANEOUS at 01:02

## 2024-02-12 RX ADMIN — METHOCARBAMOL 500 MG: 100 INJECTION, SOLUTION INTRAMUSCULAR; INTRAVENOUS at 11:02

## 2024-02-12 RX ADMIN — Medication 10 ML: at 08:02

## 2024-02-12 RX ADMIN — METHOCARBAMOL 500 MG: 100 INJECTION, SOLUTION INTRAMUSCULAR; INTRAVENOUS at 06:02

## 2024-02-12 NOTE — PLAN OF CARE
Problem: Adult Inpatient Plan of Care  Goal: Plan of Care Review  2/12/2024 0611 by Conchita Martinez RN  Outcome: Ongoing, Progressing  2/12/2024 0611 by Conchita Martinez RN  Outcome: Ongoing, Progressing  Flowsheets (Taken 2/12/2024 0611)  Plan of Care Reviewed With: patient  Goal: Patient-Specific Goal (Individualized)  2/12/2024 0611 by Conchita Martinez RN  Outcome: Ongoing, Progressing  2/12/2024 0611 by Conchita Martinez RN  Outcome: Ongoing, Progressing  Goal: Absence of Hospital-Acquired Illness or Injury  Outcome: Ongoing, Progressing  Goal: Optimal Comfort and Wellbeing  Outcome: Ongoing, Progressing  Goal: Readiness for Transition of Care  Outcome: Ongoing, Progressing

## 2024-02-12 NOTE — PLAN OF CARE
Problem: Adult Inpatient Plan of Care  Goal: Plan of Care Review  Outcome: Ongoing, Progressing  Goal: Patient-Specific Goal (Individualized)  Outcome: Ongoing, Progressing  Goal: Absence of Hospital-Acquired Illness or Injury  Outcome: Ongoing, Progressing  Goal: Optimal Comfort and Wellbeing  Outcome: Ongoing, Progressing  Goal: Readiness for Transition of Care  Outcome: Ongoing, Progressing     Problem: Fluid and Electrolyte Imbalance (Acute Kidney Injury/Impairment)  Goal: Fluid and Electrolyte Balance  Outcome: Ongoing, Progressing     Problem: Oral Intake Inadequate (Acute Kidney Injury/Impairment)  Goal: Optimal Nutrition Intake  Outcome: Ongoing, Progressing     Problem: Renal Function Impairment (Acute Kidney Injury/Impairment)  Goal: Effective Renal Function  Outcome: Ongoing, Progressing     Problem: Impaired Wound Healing  Goal: Optimal Wound Healing  Outcome: Ongoing, Progressing     Problem: Fall Injury Risk  Goal: Absence of Fall and Fall-Related Injury  Outcome: Ongoing, Progressing     Problem: Infection  Goal: Absence of Infection Signs and Symptoms  Outcome: Ongoing, Progressing  [facility] Plan of Care Note  Dx Final diagnoses:  [N13.30] Hydronephrosis of left kidney     Shift Events pt ambulated in [place]sec[place] with PT. And sat up in  charge majority of shift. Req prn pain med x1. Denies nausea and no vomiting noted.    Neuro: aox4    Vital Signs: wnl    Respiratory: wnl    Diet: npo    Urine Output/Bowel Movement: per ileostomy/ soft bm x1

## 2024-02-12 NOTE — PT/OT/SLP PROGRESS
Physical Therapy Treatment    Patient Name:  Edita Riley   MRN:  [id number]    Recommendations:     Discharge Recommendations: Low Intensity Therapy  Discharge Equipment Recommendations: none  Barriers to discharge: None    Assessment:     Edita Riley is a 59 y.o. female admitted with a medical diagnosis of Hydronephrosis of left kidney.  She presents with the following impairments/functional limitations: weakness, impaired endurance, impaired self care skills, impaired functional mobility, gait instability, impaired balance . presents with  improved overall functional mobility requiring decreased assistance and increased activity tolerance to perform functional mobility.Pt would continue to benefit from skilled PT to address overall functional mobility, to return to functional baseline and goals. Goals remain appropriate.        Rehab Prognosis: Good; patient would benefit from acute skilled PT services to address these deficits and reach maximum level of function.    Recent Surgery: Procedure(s) (LRB):  REVISION, ANASTOMOSIS, URETEROILEAL (N/A)  OPEN LAPAROTOMY, EXPLORATORY (N/A)  CLOSURE, ILEOSTOMY (N/A)  ANASTOMOSIS, INTESTINE - Ileocolic anastomosis (N/A)  EXCISION, SMALL INTESTINE  LYSIS, ADHESIONS  LYSIS, ADHESIONS 6 Days Post-Op    Plan:     During this hospitalization, patient to be seen 4 x/week to address the identified rehab impairments via gait training, therapeutic activities, therapeutic exercises and progress toward the following goals:    Plan of Care Expires:  03/08/24    Subjective     Patient/Family Comments/goals: yes I will go walk  Pain/Comfort:  Pain Rating 1: 0/10      Objective:     Communicated with RN prior to session.  Patient found HOB elevated with  (colostomy; peripheral IV; nino catheter; ORESTES drain) upon PT entry to room.     General Precautions: Standard, fall  Orthopedic Precautions: N/A  Braces: N/A  Respiratory Status: Room air     Functional  Mobility:  Bed Mobility:     Rolling Right: stand by assistance  Scooting: stand by assistance  Supine to Sit: stand by assistance  Transfers:     Sit to Stand:  stand by assistance with rolling walker  Gait: 200' x 2  with RW and SBA with slow, steady gait without LOB. Sitting rest break between trials  Stairs:  Pt ascended/descended 4 stair(s) with No Assistive Device with left handrail and HHA with Contact Guard Assistance/SBA.       AM-PAC 6 CLICK MOBILITY  Turning over in bed (including adjusting bedclothes, sheets and blankets)?: 3  Sitting down on and standing up from a chair with arms (e.g., wheelchair, bedside commode, etc.): 3  Moving from lying on back to sitting on the side of the bed?: 3  Moving to and from a bed to a chair (including a wheelchair)?: 3  Need to walk in hospital room?: 3  Climbing 3-5 steps with a railing?: 3  Basic Mobility Total Score: 18       Treatment & Education:  Therapist provided instruction and educated of  patient on progress, safety,d/c,PT POC,   proper body mechanics, energy conservation, and fall prevention strategies during tasks listed above, on the effects of prolonged immobility and the importance of performing OOB activity and exercises to promote healing and reduce recovery time      Updated white board with appropriate PT mobility information for medical team notification   Donned an extra gown   Pt safe to ambulate in hallway with RN or PCT assistance  Call nursing/pct to transfer to chair/use bathroom. Pt stated understanding  Bedside table in front of patient and area set up for function, convenience, and safety. RN aware of patient's mobility needs and status. Questions/concerns addressed within PTA scope of practice; patient  with no further questions. Time was provided for active listening, discussion of health disposition, and discussion of safe discharge. Pt?verbalized?agreement .    Patient left up in chair with all lines intact, call button in reach, and  nsg notified..    GOALS:   Multidisciplinary Problems       Physical Therapy Goals          Problem: Physical Therapy    Goal Priority Disciplines Outcome Goal Variances Interventions   Physical Therapy Goal     PT, PT/OT Ongoing, Progressing     Description: Goals to be met by: 2024     Patient will increase functional independence with mobility by performin. Supine to sit with Set-up [place]  2. Sit to supine with Set-up [place]  3. Sit to stand transfer with Supervision  4. Bed to chair transfer with Supervision using Rolling Walker  5. Gait  x 250 feet with Supervision using Rolling Walker.   6. Ascend/descend 4 stair with left Handrails Stand-by Assistance using No Assistive Device.   7. Lower extremity exercise program x15 reps per handout, with supervision                         Time Tracking:     PT Received On: 24  PT Start Time: 1050     PT Stop Time: 1113  PT Total Time (min): 23 min     Billable Minutes: Gait Training 15 and Therapeutic Activity 8    Treatment Type: Treatment  PT/PTA: PTA     Number of PTA visits since last PT visit: 2024

## 2024-02-12 NOTE — PROGRESS NOTES
[facility]  Wound Care    Patient Name:  Edita Riley   MRN:  [id number]  Date: 2/12/2024  Diagnosis: Hydronephrosis of left kidney    History:     Past Medical History:   Diagnosis Date    Abnormal mammogram 08/25/2020    Abnormal mammogram 08/25/2020    Acute blood loss anemia     Acute deep vein thrombosis (DVT) of lower extremity 12/09/2020    Advance care planning 04/30/2021    ODESSA (acute kidney injury) 03/21/2020    Anemia due to chronic blood loss     Anemia due to chronic kidney disease     Anemia due to chronic kidney disease 05/18/2020    Anxiety     Bilateral ureteral obstruction 09/11/2020    Bilious vomiting with nausea 06/11/2023    Cardiovascular event risk -- low 09/14/2015    ASCVD 10-year risk 1.9% (with optimal risk factors 1.3%) as of 9/14/2015     Cervical cancer 2014    Chronic back pain     Colostomy care     Deep vein thrombosis     Depression     Diarrhea due to malabsorption 11/14/2018    Difficult intubation     Discharge planning issues 04/30/2021    Disorder of kidney and ureter     DVT of lower extremity, bilateral 11/04/2020    Edema 04/10/2023    Emphysema of lung 04/10/2023    Fibromyalgia     Fungemia 09/27/2020    Generalized abdominal pain 08/25/2020    GERD (gastroesophageal reflux disease)     Hemifacial spasm 09/16/2015    Hiatal hernia 2014    History of cervical cancer 10/11/2018    History of essential hypertension 05/04/2015    Not requiring medications at this time  BP is low- concern that this may correlate with increased stool output from stoma. Encourage her to contact GI and her PCP.    Hx of psychiatric care     Cymbalta, trazodone    Hypertension     Hypomagnesemia 11/21/2018    Hyponatremia 09/10/2023    Impaired functional mobility, balance, gait, and endurance 07/24/2015    Lactose intolerance     Major depressive disorder, recurrent episode, moderate 06/02/2023    Metastatic squamous cell carcinoma to lymph node 10/11/2018    Moderate episode of  recurrent major depressive disorder 2021    Nephrostomy complication 10/26/2023    Neuropathy due to chemotherapeutic drug 2018    Osteoarthritis of back     Peritonitis 2020    Physical deconditioning 2021    Pseudomonas urinary tract infection 2021    Psychiatric problem     Pyelitis 2023    QT prolongation 2021    Refusal of blood transfusions as patient is Taoism     Estephanieki's ring 2015    Seen on outside EGD 2014, underwent esophageal dilatation. Bx were negative.     Seizure-like activity 2018    Seizures     Sleep stage dysfunction     Noted on PSG 2017; negative for obstructive sleep apnea     Stroke     Urinary tract infection associated with nephrostomy catheter 2020    Wound infection after surgery 2020       Social History     Socioeconomic History    Marital status:      Spouse name: Hammad    Number of children: 2   Tobacco Use    Smoking status: Never    Smokeless tobacco: Never   Substance and Sexual Activity    Alcohol use: No     Alcohol/week: 0.0 standard drinks of alcohol    Drug use: No    Sexual activity: Yes     Partners: Male     Birth control/protection: None     Comment:  19 years since    Social History Narrative    , twin daughters (1  2018), disabled due to childhood stroke, Taoism sophia     Social Determinants of Health     Financial Resource Strain: Medium Risk (2024)    Overall Financial Resource Strain (CARDIA)     Difficulty of Paying Living Expenses: Somewhat hard   Food Insecurity: No Food Insecurity (2024)    Hunger Vital Sign     Worried About Running Out of Food in the Last Year: Never true     Ran Out of Food in the Last Year: Never true   Transportation Needs: No Transportation Needs (2024)    PRAPARE - Transportation     Lack of Transportation (Medical): No     Lack of Transportation (Non-Medical): No   Physical Activity:  Insufficiently Active (2/7/2024)    Exercise Vital Sign     Days of Exercise per Week: 4 days     Minutes of Exercise per Session: 20 min   Stress: Stress Concern Present (2/7/2024)    Citizen of [place] of Occupational Health - Occupational Stress Questionnaire     Feeling of Stress : To some extent   Social Connections: Moderately Integrated (2/7/2024)    Social Connection and Isolation Panel [NHANES]     Frequency of Communication with Friends and Family: Three times a week     Frequency of Social Gatherings with Friends and Family: Once a week     Attends Latter day Services: 1 to 4 times per year     Active Member of Clubs or Organizations: No     Attends Club or Organization Meetings: Never     Marital Status:    Housing Stability: Low Risk  (2/7/2024)    Housing Stability Vital Sign     Unable to Pay for Housing in the Last Year: No     Number of Places Lived in the Last Year: 1     Unstable Housing in the Last Year: No       Precautions:     Allergies as of 09/22/2023 - Reviewed 09/21/2023   Allergen Reaction Noted    Bee sting [allergen ext-venom-honey bee]  05/04/2015    Grass pollen-bermuda, standard  05/04/2015       WOC Assessment Details/Treatment     Patient seen for wound care follow up for urostomy.   Reviewed chart for this encounter.   See Flow Sheet for findings.    Pt found sitting up in chair, agreeable to care at this time. Per pt, pt states she is independent w/ needs and has no questions or concerns addressed at this time. Supplies at bedside, will contine to follow for urostomy needs.    Bedside nursing to continue care & monitoring.  Bedside nursing to maintain pressure injury prevention interventions.     02/12/24 1201   WOCN Assessment   WOCN Total Time (mins) 15   Visit Date 02/12/24   Visit Time 1201   Consult Type Follow Up   WOCN Speciality Ostomy   WOCN List urostomy   Teaching on-going

## 2024-02-12 NOTE — NURSING
Pt reports no BM overnight, last BM 02/11/24.. Per patient. No complaints of nausea or vomiting. Patient ambulated x1 to bed with nurse assist overnight to bed. No complaints of pain. Drains intact. Minimal output to ORESTES drain and nephrostomy. Plan of care continued. TPN continued overnight.

## 2024-02-12 NOTE — PROGRESS NOTES
[facility]  Urology  Progress Note    Patient Name: Edita Riley  MRN: [id number]  Admission Date: 2/6/2024  Hospital Length of Stay: 6 days  Code Status: Full Code   Attending Provider: Leslie Balbuena MD   Primary Care Physician: Trina Vu MD    Subjective:     HPI:  Edita Riley is a 58 y.o. female who presents with a history of radiation therapy for cervical cancer which was complicated by bilateral distal ureteral obstruction with bilateral hydronephrosis.  She is status post colon conduit which was performed in 2020.  This was complicated by large bowel perforation away from the anastamosis.  She has an ileostomy and a left percutaneous nephrostomy that was last exchanged on 10/26/2023.  She was admitted to the hospital on 12/7/2023 after her percutaneous nephrostomy tube was clogged and she had been feeling ill.  She was scheduled for revision but missed her appointment with Dr. Reynolds and had to be rescheduled.     Interval History: NAEO. AFVSS. Vomiting yesterday. Refused NGT. No N/V overnight. Drain SS. Nephrostomy tube and urostomy clear yellow. Passing gas, no BM. Belching. Walked x2 yesterday.    Objective:     Temp:  [97.5 °F (36.4 °C)-98.6 °F (37 °C)] 98.3 °F (36.8 °C)  Pulse:  [81-96] 87  Resp:  [18-20] 18  SpO2:  [94 %-95 %] 94 %  BP: (107-138)/(57-66) 125/61     Body mass index is 26.7 kg/m².           Drains       Drain  Duration                  Suprapubic Catheter 100% silicone 16 Fr. -- days         Nephrostomy 02/02/24 1255 Left 12 Fr. 8 days         Closed/Suction Drain 02/06/24 Tube - 1 Midline Abdomen Bulb 15 Fr. 5 days         Urostomy 02/06/24 1815 ureterostomy, left 4 days                     Physical Exam  Constitutional:       General: She is not in acute distress.     Appearance: Normal appearance.   HENT:      Head: Normocephalic and atraumatic.      Nose: Nose normal.   Eyes:      Conjunctiva/sclera: Conjunctivae normal.   Cardiovascular:       "Rate and Rhythm: Normal rate.   Pulmonary:      Effort: Pulmonary effort is normal. No respiratory distress.   Abdominal:      Palpations: Abdomen is soft.      Comments: Incision CDI  Abdomen appropriately ttp. Soft, non-distended.   Urostomy in place, stent appears appropriately placed, urostomy with CYU output, no surrounding erythema/signs of dehiscence/signs of infection  Prior ileostomy site with packing in place, no significant discharge or purulence  L NT in place with cyu  ORESTES in place w/ minimal ss output    Musculoskeletal:         General: No deformity.   Skin:     General: Skin is warm and dry.   Neurological:      General: No focal deficit present.      Mental Status: She is alert.   Psychiatric:         Mood and Affect: Mood normal.         Behavior: Behavior normal.           Significant Labs:    BMP:  Recent Labs   Lab 02/09/24  0420 02/10/24  0504 02/11/24  0501   * 146* 145   K 2.8* 3.0* 3.4*    106 105   CO2 31* 32* 35*   BUN 12 11 12   CREATININE 1.2 1.0 0.9   CALCIUM 8.6* 8.3* 8.4*       CBC:   Recent Labs   Lab 02/09/24  0420 02/10/24  0504 02/11/24  0501   WBC 8.70 6.65 7.68   HGB 9.7* 9.3* 8.4*   HCT 32.3* 31.9* 28.6*    236 217       Urine Studies: No results for input(s): "COLORU", "APPEARANCEUA", "PHUR", "SPECGRAV", "PROTEINUA", "GLUCUA", "KETONESU", "BILIRUBINUA", "OCCULTUA", "NITRITE", "UROBILINOGEN", "LEUKOCYTESUR", "RBCUA", "WBCUA", "BACTERIA", "SQUAMEPITHEL", "HYALINECASTS" in the last 168 hours.    Invalid input(s): "WRIGHTSUR"  All pertinent labs results from the past 24 hours have been reviewed.    Significant Imaging:  All pertinent imaging results/findings from the past 24 hours have been reviewed.      Assessment/Plan:     Ileostomy in place  Edita Riley is a 59 y.o. female with a history of cervical cancer s/p XRT complicated by end-stage bladder and distal ureteral strictures. She underwent urinary diversion with a transverse colon conduit in " 2020 complicated by a bowel perforation resulting in an ileostomy creation for GI diversion. She is now s/p surgical repair of her left ureterocolic anastomotic stricture and ileostomy reversal on 2/6/24.       - Pain - MMPC  - Diet - NPO; refusing NGT, understands risks  - All meds converted to IV  - Continue TPN, reordered  - mIVF: LR @ 125 mL/hr   - maintain drains   - Nausea control w/ PRN antiemetics   - OOB, ambulate 5x daily    - PT/OT  - Encourage IS  - DVT ppx: sqh   - daily labs  - restarting home meds as appropriate   - Discussed with pharmacy regarding electrolytes, will replete K and Pharmacy will adjust lyte replacement via TPN  - Please include nursing notes for shift, patient may be unreliable historian to surgical team on morning rounds     - Dispo: continue inpatient care           VTE Risk Mitigation (From admission, onward)           Ordered     heparin (porcine) injection 5,000 Units  Every 8 hours         02/06/24 1725     Place ANANT hose  Until discontinued         02/06/24 0526     Place sequential compression device  Until discontinued         02/06/24 0526                    Marlo Gabriel MD  Urology  [facility]

## 2024-02-12 NOTE — PT/OT/SLP PROGRESS
Occupational Therapy   Treatment    Name: Edita Riley  MRN: [id number]  Admitting Diagnosis:  Hydronephrosis of left kidney  6 Days Post-Op    Recommendations:     Discharge Recommendations: Low Intensity Therapy  Discharge Equipment Recommendations:  none  Barriers to discharge:  None    Assessment:     Edita Riley is a 59 y.o. female with a medical diagnosis of Hydronephrosis of left kidney.  She presents with the fallowing performance deficits affecting function are weakness, impaired endurance, impaired self care skills, impaired functional mobility, gait instability, decreased lower extremity function, impaired balance, decreased safety awareness, impaired cardiopulmonary response to activity. Patient is making progress with functional mobility and self-care task. Patient has the potential to meet stated goals and return to PLOF. Therefore; patient would benefit from Low intensity therapy intervention to address over all decline with functional mobility and self-care task in order to gain functional independence and return to PLOF.    Rehab Prognosis:  Good; patient would benefit from acute skilled OT services to address these deficits and reach maximum level of function.       Plan:     Patient to be seen 4 x/week to address the above listed problems via self-care/home management, therapeutic activities, therapeutic exercises  Plan of Care Expires: 03/08/24  Plan of Care Reviewed with: patient    Subjective     Chief Complaint: none  Patient/Family Comments/goals: to get better and return to PLOF  Pain/Comfort:  Pain Rating 1: 0/10    Objective:     Communicated with: Nurse prior to session.  Patient found up in chair with peripheral IV, nephrostomy, SCD upon OT entry to room.    General Precautions: Standard, fall    Orthopedic Precautions:N/A  Braces: N/A  Respiratory Status: Room air     Occupational Performance:      Functional Mobility/Transfers:  . Patient participated on  strengthening exercises of UE/LE utilizing resistance band to impact with functional endurance.    [facility] 6 Click ADL: 18    Treatment & Education:  HARPREET discuss OT POC and progress  Addressed patient's questions and concerns within HUGHES scope of practice.    Patient left up in chair with all lines intact and call button in reach    GOALS:   Multidisciplinary Problems       Occupational Therapy Goals          Problem: Occupational Therapy    Goal Priority Disciplines Outcome Interventions   Occupational Therapy Goal     OT, PT/OT Ongoing, Progressing    Description: Goals to be met by: 2/14/24    Patient will increase functional independence with ADLs by performing:    LE Dressing with Stand-by Assistance.  Grooming while standing at sink with Set-up Assistance.  Toileting from toilet with Stand-by Assistance for hygiene and clothing management.   Supine to sit with Supervision.  Toilet transfer to toilet with Supervision.                         Time Tracking:     OT Date of Treatment: 02/12/24  OT Start Time: 1219  OT Stop Time: 1231  OT Total Time (min): 12 min    Billable Minutes:Therapeutic Exercise 12    OT/HARPREET: HARPREET     Number of HARPREET visits since last OT visit: 1    2/12/2024

## 2024-02-13 LAB
ANION GAP SERPL CALC-SCNC: 7 MMOL/L (ref 8–16)
BASOPHILS # BLD AUTO: 0.02 K/UL (ref 0–0.2)
BASOPHILS NFR BLD: 0.2 % (ref 0–1.9)
BUN SERPL-MCNC: 12 MG/DL (ref 8–23)
CALCIUM SERPL-MCNC: 8.3 MG/DL (ref 8.7–10.5)
CHLORIDE SERPL-SCNC: 101 MMOL/L (ref 95–110)
CO2 SERPL-SCNC: 30 MMOL/L (ref 23–29)
CREAT SERPL-MCNC: 0.8 MG/DL (ref 0.5–1.4)
DIFFERENTIAL METHOD BLD: ABNORMAL
EOSINOPHIL # BLD AUTO: 0.4 K/UL (ref 0–0.5)
EOSINOPHIL NFR BLD: 5.3 % (ref 0–8)
ERYTHROCYTE [DISTWIDTH] IN BLOOD BY AUTOMATED COUNT: 15.8 % (ref 11.5–14.5)
EST. GFR  (NO RACE VARIABLE): >60 ML/MIN/1.73 M^2
GLUCOSE SERPL-MCNC: 113 MG/DL (ref 70–110)
HCT VFR BLD AUTO: 29.5 % (ref 37–48.5)
HGB BLD-MCNC: 8.9 G/DL (ref 12–16)
IMM GRANULOCYTES # BLD AUTO: 0.06 K/UL (ref 0–0.04)
IMM GRANULOCYTES NFR BLD AUTO: 0.7 % (ref 0–0.5)
LYMPHOCYTES # BLD AUTO: 1 K/UL (ref 1–4.8)
LYMPHOCYTES NFR BLD: 11.7 % (ref 18–48)
MAGNESIUM SERPL-MCNC: 1.9 MG/DL (ref 1.6–2.6)
MCH RBC QN AUTO: 29 PG (ref 27–31)
MCHC RBC AUTO-ENTMCNC: 30.2 G/DL (ref 32–36)
MCV RBC AUTO: 96 FL (ref 82–98)
MONOCYTES # BLD AUTO: 1 K/UL (ref 0.3–1)
MONOCYTES NFR BLD: 12.5 % (ref 4–15)
NEUTROPHILS # BLD AUTO: 5.8 K/UL (ref 1.8–7.7)
NEUTROPHILS NFR BLD: 69.6 % (ref 38–73)
NRBC BLD-RTO: 0 /100 WBC
PHOSPHATE SERPL-MCNC: 3 MG/DL (ref 2.7–4.5)
PLATELET # BLD AUTO: 249 K/UL (ref 150–450)
PMV BLD AUTO: 10.2 FL (ref 9.2–12.9)
POCT GLUCOSE: 103 MG/DL (ref 70–110)
POTASSIUM SERPL-SCNC: 3.9 MMOL/L (ref 3.5–5.1)
RBC # BLD AUTO: 3.07 M/UL (ref 4–5.4)
SODIUM SERPL-SCNC: 138 MMOL/L (ref 136–145)
WBC # BLD AUTO: 8.31 K/UL (ref 3.9–12.7)

## 2024-02-13 PROCEDURE — 25000003 PHARM REV CODE 250

## 2024-02-13 PROCEDURE — 85025 COMPLETE CBC W/AUTO DIFF WBC: CPT | Performed by: STUDENT IN AN ORGANIZED HEALTH CARE EDUCATION/TRAINING PROGRAM

## 2024-02-13 PROCEDURE — A4216 STERILE WATER/SALINE, 10 ML: HCPCS | Performed by: UROLOGY

## 2024-02-13 PROCEDURE — 84100 ASSAY OF PHOSPHORUS: CPT | Performed by: STUDENT IN AN ORGANIZED HEALTH CARE EDUCATION/TRAINING PROGRAM

## 2024-02-13 PROCEDURE — A4217 STERILE WATER/SALINE, 500 ML: HCPCS

## 2024-02-13 PROCEDURE — 63600175 PHARM REV CODE 636 W HCPCS: Performed by: STUDENT IN AN ORGANIZED HEALTH CARE EDUCATION/TRAINING PROGRAM

## 2024-02-13 PROCEDURE — 63600175 PHARM REV CODE 636 W HCPCS: Performed by: PHYSICIAN ASSISTANT

## 2024-02-13 PROCEDURE — B4185 PARENTERAL SOL 10 GM LIPIDS: HCPCS

## 2024-02-13 PROCEDURE — 25000003 PHARM REV CODE 250: Performed by: PHYSICIAN ASSISTANT

## 2024-02-13 PROCEDURE — 80048 BASIC METABOLIC PNL TOTAL CA: CPT | Performed by: STUDENT IN AN ORGANIZED HEALTH CARE EDUCATION/TRAINING PROGRAM

## 2024-02-13 PROCEDURE — 25000003 PHARM REV CODE 250: Performed by: UROLOGY

## 2024-02-13 PROCEDURE — 63600175 PHARM REV CODE 636 W HCPCS: Mod: JZ,JG

## 2024-02-13 PROCEDURE — 20600001 HC STEP DOWN PRIVATE ROOM

## 2024-02-13 PROCEDURE — 63600175 PHARM REV CODE 636 W HCPCS

## 2024-02-13 PROCEDURE — 83735 ASSAY OF MAGNESIUM: CPT | Performed by: STUDENT IN AN ORGANIZED HEALTH CARE EDUCATION/TRAINING PROGRAM

## 2024-02-13 RX ORDER — POTASSIUM CHLORIDE 7.45 MG/ML
10 INJECTION INTRAVENOUS ONCE
Status: COMPLETED | OUTPATIENT
Start: 2024-02-13 | End: 2024-02-13

## 2024-02-13 RX ORDER — ACETAMINOPHEN 10 MG/ML
1000 INJECTION, SOLUTION INTRAVENOUS EVERY 8 HOURS
Status: DISPENSED | OUTPATIENT
Start: 2024-02-13 | End: 2024-02-14

## 2024-02-13 RX ORDER — MAGNESIUM SULFATE HEPTAHYDRATE 40 MG/ML
2 INJECTION, SOLUTION INTRAVENOUS ONCE
Status: COMPLETED | OUTPATIENT
Start: 2024-02-13 | End: 2024-02-13

## 2024-02-13 RX ADMIN — OXYCODONE HYDROCHLORIDE 10 MG: 10 TABLET ORAL at 07:02

## 2024-02-13 RX ADMIN — HYDROMORPHONE HYDROCHLORIDE 1 MG: 1 INJECTION, SOLUTION INTRAMUSCULAR; INTRAVENOUS; SUBCUTANEOUS at 10:02

## 2024-02-13 RX ADMIN — HEPARIN SODIUM 5000 UNITS: 5000 INJECTION INTRAVENOUS; SUBCUTANEOUS at 05:02

## 2024-02-13 RX ADMIN — ACETAMINOPHEN 1000 MG: 10 INJECTION, SOLUTION INTRAVENOUS at 10:02

## 2024-02-13 RX ADMIN — ACETAMINOPHEN 1000 MG: 10 INJECTION, SOLUTION INTRAVENOUS at 05:02

## 2024-02-13 RX ADMIN — Medication 10 ML: at 05:02

## 2024-02-13 RX ADMIN — Medication 10 ML: at 11:02

## 2024-02-13 RX ADMIN — Medication 10 ML: at 04:02

## 2024-02-13 RX ADMIN — SMOFLIPID 250 ML: 6; 6; 5; 3 INJECTION, EMULSION INTRAVENOUS at 10:02

## 2024-02-13 RX ADMIN — HYDROMORPHONE HYDROCHLORIDE 1 MG: 1 INJECTION, SOLUTION INTRAMUSCULAR; INTRAVENOUS; SUBCUTANEOUS at 09:02

## 2024-02-13 RX ADMIN — HEPARIN SODIUM 5000 UNITS: 5000 INJECTION INTRAVENOUS; SUBCUTANEOUS at 10:02

## 2024-02-13 RX ADMIN — METHOCARBAMOL 500 MG: 100 INJECTION, SOLUTION INTRAMUSCULAR; INTRAVENOUS at 04:02

## 2024-02-13 RX ADMIN — MAGNESIUM SULFATE HEPTAHYDRATE 2 G: 40 INJECTION, SOLUTION INTRAVENOUS at 08:02

## 2024-02-13 RX ADMIN — METHOCARBAMOL 500 MG: 100 INJECTION, SOLUTION INTRAMUSCULAR; INTRAVENOUS at 10:02

## 2024-02-13 RX ADMIN — POTASSIUM CHLORIDE 10 MEQ: 7.46 INJECTION, SOLUTION INTRAVENOUS at 08:02

## 2024-02-13 RX ADMIN — MAGNESIUM SULFATE HEPTAHYDRATE: 500 INJECTION, SOLUTION INTRAMUSCULAR; INTRAVENOUS at 10:02

## 2024-02-13 RX ADMIN — ERTAPENEM 1 G: 1 INJECTION INTRAMUSCULAR; INTRAVENOUS at 02:02

## 2024-02-13 RX ADMIN — HEPARIN SODIUM 5000 UNITS: 5000 INJECTION INTRAVENOUS; SUBCUTANEOUS at 02:02

## 2024-02-13 RX ADMIN — ACETAMINOPHEN 1000 MG: 10 INJECTION, SOLUTION INTRAVENOUS at 02:02

## 2024-02-13 RX ADMIN — METHOCARBAMOL 500 MG: 100 INJECTION, SOLUTION INTRAMUSCULAR; INTRAVENOUS at 05:02

## 2024-02-13 NOTE — SUBJECTIVE & OBJECTIVE
Subjective:     Interval History: No issues overnight. No nausea/vomiting. Passing flatus/BM. Pain well controlled.    Post-Op Info:  Procedure(s) (LRB):  REVISION, ANASTOMOSIS, URETEROILEAL (N/A)  OPEN LAPAROTOMY, EXPLORATORY (N/A)  CLOSURE, ILEOSTOMY (N/A)  ANASTOMOSIS, INTESTINE - Ileocolic anastomosis (N/A)  EXCISION, SMALL INTESTINE  LYSIS, ADHESIONS  LYSIS, ADHESIONS   7 Days Post-Op      Medications:  Continuous Infusions:   lactated ringers 75 mL/hr at 02/12/24 1903    TPN ADULT CENTRAL LINE CUSTOM 50 mL/hr at 02/12/24 2238     Scheduled Meds:   acetaminophen  1,000 mg Intravenous Q8H    ertapenem (INVanz) IV (PEDS and ADULTS)  1 g Intravenous Q24H    heparin (porcine)  5,000 Units Subcutaneous Q8H    lipid (SMOFLIPID)  250 mL Intravenous Daily    magnesium sulfate IVPB  2 g Intravenous Once    methocarbamol (ROBAXIN) IVPB  500 mg Intravenous Q6H    sodium chloride 0.9%  10 mL Intravenous Q6H     PRN Meds:   HYDROmorphone    LIDOcaine HCL 10 mg/ml (1%)    melatonin    ondansetron    oxyCODONE    oxyCODONE    prochlorperazine    sodium chloride 0.9%    sodium chloride 0.9%        Objective:     Vital Signs (Most Recent):  Temp: 98.6 °F (37 °C) (02/13/24 0813)  Pulse: 88 (02/13/24 0813)  Resp: 16 (02/13/24 0813)  BP: (!) 113/59 (02/13/24 0813)  SpO2: 96 % (02/13/24 0813) Vital Signs (24h Range):  Temp:  [97.8 °F (36.6 °C)-98.6 °F (37 °C)] 98.6 °F (37 °C)  Pulse:  [73-90] 88  Resp:  [14-18] 16  SpO2:  [92 %-98 %] 96 %  BP: (108-130)/(58-76) 113/59     Intake/Output - Last 3 Shifts         02/11 0700  02/12 0659 02/12 0700  02/13 0659 02/13 0700  02/14 0659    P.O.       I.V. (mL/kg)       Other 1100 650           Total Intake(mL/kg) 1668 (20.3) 650 (7.9)     Urine (mL/kg/hr) 1350 (0.7) 2725 (1.4) 600 (3.7)    Emesis/NG output       Drains 10 7.5     Stool 0 0     Total Output 1360 2732.5 600    Net +308 -2082.5 -600           Stool Occurrence 1 x 1 x              Physical Exam  Vitals and nursing note  reviewed.   Constitutional:       General: She is not in acute distress.  Cardiovascular:      Rate and Rhythm: Normal rate.      Pulses: Normal pulses.   Pulmonary:      Effort: Pulmonary effort is normal. No respiratory distress.   Abdominal:      General: There is no distension.      Palpations: Abdomen is soft.      Tenderness: There is no abdominal tenderness.   Neurological:      General: No focal deficit present.      Mental Status: She is alert and oriented to person, place, and time.            Significant Labs:  BMP:   Recent Labs   Lab 02/13/24  0429   *      K 3.9      CO2 30*   BUN 12   CREATININE 0.8   CALCIUM 8.3*   MG 1.9     CBC:   Recent Labs   Lab 02/13/24  0429   WBC 8.31   RBC 3.07*   HGB 8.9*   HCT 29.5*      MCV 96   MCH 29.0   MCHC 30.2*     CRP (Last 3 Results):   Recent Labs   Lab 02/10/24  0504 02/11/24  0501 02/12/24  0521   CRP 65.6* 46.7* 42.6*       Significant Diagnostics:  I have reviewed all pertinent imaging results/findings within the past 24 hours.

## 2024-02-13 NOTE — PLAN OF CARE
.[facility] Plan of Care Note  Dx: Hydropheresis of Left Kidney    Shift Events: POC reviewed. AAOx4, VSS on RA with no complaints of SOB, headaches, or dizziness. Urine output per urostomy and nephrostomy. Diet NPO tolerated. No BM and no complaints of nausea or vomiting. Pain controlled with scheduled and prn pain medications. Resting with side rails up and call light within reach. Continuing to monitor patient status.     Goals of Care: Monitoring of I/O urine and bowel. Pain control    Neuro: AAOx4    Vital Signs: VSS on RA    Respiratory: WDL    Diet: NPO tolerated    Is patient tolerating current diet? NPO    GTTS: Right AC PICC with TPN/Lipids, continuous fluids, and IV medications.    Urine Output/Bowel Movement: Urine output per urostomy and nephrostomy, adequate.    Drains/Tubes/Tube Feeds (include total output/shift): Urostomy and Nephrostomh, ORESTES Drain    Lines: Right AC PICC line    Accuchecks: None    Skin: Skin intact.     Fall Risk Score:     Activity level? X1 Assist    Any scheduled procedures? No    Any safety concerns? Falls Risk    Other:     Problem: Adult Inpatient Plan of Care  Goal: Plan of Care Review  Outcome: Ongoing, Progressing     Problem: Adult Inpatient Plan of Care  Goal: Patient-Specific Goal (Individualized)  Outcome: Ongoing, Progressing     Problem: Adult Inpatient Plan of Care  Goal: Absence of Hospital-Acquired Illness or Injury  Outcome: Ongoing, Progressing     Problem: Adult Inpatient Plan of Care  Goal: Optimal Comfort and Wellbeing  Outcome: Ongoing, Progressing     Problem: Adult Inpatient Plan of Care  Goal: Readiness for Transition of Care  Outcome: Ongoing, Progressing     Problem: Oral Intake Inadequate (Acute Kidney Injury/Impairment)  Goal: Optimal Nutrition Intake  Outcome: Ongoing, Progressing     Problem: Renal Function Impairment (Acute Kidney Injury/Impairment)  Goal: Effective Renal Function  Outcome: Ongoing, Progressing     Problem: Fall Injury Risk  Goal:  Absence of Fall and Fall-Related Injury  Outcome: Ongoing, Progressing     Problem: Infection  Goal: Absence of Infection Signs and Symptoms  Outcome: Ongoing, Progressing

## 2024-02-13 NOTE — PROGRESS NOTES
[facility]  Colorectal Surgery  Progress Note    Patient Name: Edita Riley  MRN: [id number]  Admission Date: 2/6/2024  Hospital Length of Stay: 7 days  Attending Physician: Leslie Balbuena MD    Subjective:     Interval History: No issues overnight. No nausea/vomiting. Passing flatus/BM. Pain well controlled.    Post-Op Info:  Procedure(s) (LRB):  REVISION, ANASTOMOSIS, URETEROILEAL (N/A)  OPEN LAPAROTOMY, EXPLORATORY (N/A)  CLOSURE, ILEOSTOMY (N/A)  ANASTOMOSIS, INTESTINE - Ileocolic anastomosis (N/A)  EXCISION, SMALL INTESTINE  LYSIS, ADHESIONS  LYSIS, ADHESIONS   7 Days Post-Op      Medications:  Continuous Infusions:   lactated ringers 75 mL/hr at 02/12/24 1903    TPN ADULT CENTRAL LINE CUSTOM 50 mL/hr at 02/12/24 2238     Scheduled Meds:   acetaminophen  1,000 mg Intravenous Q8H    ertapenem (INVanz) IV (PEDS and ADULTS)  1 g Intravenous Q24H    heparin (porcine)  5,000 Units Subcutaneous Q8H    lipid (SMOFLIPID)  250 mL Intravenous Daily    magnesium sulfate IVPB  2 g Intravenous Once    methocarbamol (ROBAXIN) IVPB  500 mg Intravenous Q6H    sodium chloride 0.9%  10 mL Intravenous Q6H     PRN Meds:   HYDROmorphone    LIDOcaine HCL 10 mg/ml (1%)    melatonin    ondansetron    oxyCODONE    oxyCODONE    prochlorperazine    sodium chloride 0.9%    sodium chloride 0.9%        Objective:     Vital Signs (Most Recent):  Temp: 98.6 °F (37 °C) (02/13/24 0813)  Pulse: 88 (02/13/24 0813)  Resp: 16 (02/13/24 0813)  BP: (!) 113/59 (02/13/24 0813)  SpO2: 96 % (02/13/24 0813) Vital Signs (24h Range):  Temp:  [97.8 °F (36.6 °C)-98.6 °F (37 °C)] 98.6 °F (37 °C)  Pulse:  [73-90] 88  Resp:  [14-18] 16  SpO2:  [92 %-98 %] 96 %  BP: (108-130)/(58-76) 113/59     Intake/Output - Last 3 Shifts         02/11 0700  02/12 0659 02/12 0700 02/13 0659 02/13 0700  02/14 0659    P.O.       I.V. (mL/kg)       Other 1100 650           Total Intake(mL/kg) 1668 (20.3) 650 (7.9)     Urine (mL/kg/hr) 1350 (0.7) 2725  (1.4) 600 (3.7)    Emesis/NG output       Drains 10 7.5     Stool 0 0     Total Output 1360 2732.5 600    Net +308 -2082.5 -600           Stool Occurrence 1 x 1 x              Physical Exam  Vitals and nursing note reviewed.   Constitutional:       General: She is not in acute distress.  Cardiovascular:      Rate and Rhythm: Normal rate.      Pulses: Normal pulses.   Pulmonary:      Effort: Pulmonary effort is normal. No respiratory distress.   Abdominal:      General: There is no distension.      Palpations: Abdomen is soft.      Tenderness: There is no abdominal tenderness.   Neurological:      General: No focal deficit present.      Mental Status: She is alert and oriented to person, place, and time.            Significant Labs:  BMP:   Recent Labs   Lab 02/13/24  0429   *      K 3.9      CO2 30*   BUN 12   CREATININE 0.8   CALCIUM 8.3*   MG 1.9     CBC:   Recent Labs   Lab 02/13/24  0429   WBC 8.31   RBC 3.07*   HGB 8.9*   HCT 29.5*      MCV 96   MCH 29.0   MCHC 30.2*     CRP (Last 3 Results):   Recent Labs   Lab 02/10/24  0504 02/11/24  0501 02/12/24  0521   CRP 65.6* 46.7* 42.6*       Significant Diagnostics:  I have reviewed all pertinent imaging results/findings within the past 24 hours.  Assessment/Plan:     Cervical cancer  Edita Riley is a 59 y.o. year old female with history of ureteral stenosis after radiation for cervical cancer requiring urostomy (transverse colon conduit) complicated by anastomotic leak requiring right colectomy and end ileostomy now s/p ex lap, extensive maliha, revision of urostomy, small bowel resection, ileostomy reversal w/ ileosigmoid anastomosis on 2/6    No clinically improved with return of bowel function. No N/V.      Plan:  Ok to advance diet as tolerated  Recommend DC mIVF  Would recommend 1 more day of TPN, then wean to off  Ambulate, IS  Trending CRP  Rest per primary          Bria Smith MD  Colorectal Surgery  [facility]

## 2024-02-13 NOTE — SUBJECTIVE & OBJECTIVE
Interval History: NAEON. AFVSS. Denies nausea or emesis. Passing gas and having BM's. Pain controlled. Ambulating well.     Objective:     Temp:  [97.8 °F (36.6 °C)-98.6 °F (37 °C)] 98.6 °F (37 °C)  Pulse:  [73-90] 88  Resp:  [14-18] 16  SpO2:  [92 %-98 %] 96 %  BP: (108-130)/(58-76) 113/59     Body mass index is 26.7 kg/m².    Date 02/13/24 0700 - 02/14/24 0659   Shift 7106-8156 0963-8218 8598-1257 24 Hour Total   INTAKE   Shift Total(mL/kg)       OUTPUT   Urine(mL/kg/hr) 600   600   Shift Total(mL/kg) 600(7.3)   600(7.3)   Weight (kg) 82 82 82 82          Drains       Drain  Duration                  Suprapubic Catheter 100% silicone 16 Fr. -- days         Nephrostomy 02/02/24 1255 Left 12 Fr. 8 days         Closed/Suction Drain 02/06/24 Tube - 1 Midline Abdomen Bulb 15 Fr. 5 days         Urostomy 02/06/24 1815 ureterostomy, left 4 days                     Physical Exam  Constitutional:       General: She is not in acute distress.     Appearance: Normal appearance.   HENT:      Head: Normocephalic and atraumatic.      Nose: Nose normal.   Eyes:      Conjunctiva/sclera: Conjunctivae normal.   Cardiovascular:      Rate and Rhythm: Normal rate.   Pulmonary:      Effort: Pulmonary effort is normal. No respiratory distress.   Abdominal:      Palpations: Abdomen is soft.      Comments: Incision CDI  Abdomen appropriately ttp. Soft, non-distended.   Urostomy in place, stent appears appropriately placed, urostomy with CYU output, no surrounding erythema/signs of dehiscence/signs of infection  Prior ileostomy site with packing in place, no significant discharge or purulence  L NT in place with cyu  ORESTES in place w/ minimal ss output    Musculoskeletal:         General: No deformity.   Skin:     General: Skin is warm and dry.   Neurological:      General: No focal deficit present.      Mental Status: She is alert.   Psychiatric:         Mood and Affect: Mood normal.         Behavior: Behavior normal.           Significant  "Labs:    BMP:  Recent Labs   Lab 02/11/24  0501 02/12/24  0521 02/13/24  0429    139 138   K 3.4* 4.0 3.9    103 101   CO2 35* 30* 30*   BUN 12 11 12   CREATININE 0.9 0.8 0.8   CALCIUM 8.4* 8.1* 8.3*         CBC:   Recent Labs   Lab 02/11/24  0501 02/12/24  0521 02/13/24  0429   WBC 7.68 8.10 8.31   HGB 8.4* 8.8* 8.9*   HCT 28.6* 29.7* 29.5*    220 249         Urine Studies: No results for input(s): "COLORU", "APPEARANCEUA", "PHUR", "SPECGRAV", "PROTEINUA", "GLUCUA", "KETONESU", "BILIRUBINUA", "OCCULTUA", "NITRITE", "UROBILINOGEN", "LEUKOCYTESUR", "RBCUA", "WBCUA", "BACTERIA", "SQUAMEPITHEL", "HYALINECASTS" in the last 168 hours.    Invalid input(s): "WRIGHTSUR"  All pertinent labs results from the past 24 hours have been reviewed.    Significant Imaging:  All pertinent imaging results/findings from the past 24 hours have been reviewed.    Review of Systems  "

## 2024-02-13 NOTE — PROGRESS NOTES
[facility]  Urology  Progress Note    Patient Name: Edita Riley  MRN: [id number]  Admission Date: 2/6/2024  Hospital Length of Stay: 7 days  Code Status: Full Code   Attending Provider: Leslie Balbuena MD   Primary Care Physician: Trina Vu MD    Subjective:     HPI:  Edita Riley is a 58 y.o. female who presents with a history of radiation therapy for cervical cancer which was complicated by bilateral distal ureteral obstruction with bilateral hydronephrosis.  She is status post colon conduit which was performed in 2020.  This was complicated by large bowel perforation away from the anastamosis.  She has an ileostomy and a left percutaneous nephrostomy that was last exchanged on 10/26/2023.  She was admitted to the hospital on 12/7/2023 after her percutaneous nephrostomy tube was clogged and she had been feeling ill.  She was scheduled for revision but missed her appointment with Dr. Reynolds and had to be rescheduled.     Interval History: NAEON. AFVSS. Denies nausea or emesis. Passing gas and having BM's. Pain controlled. Ambulating well.     Objective:     Temp:  [97.8 °F (36.6 °C)-98.6 °F (37 °C)] 98.6 °F (37 °C)  Pulse:  [73-90] 88  Resp:  [14-18] 16  SpO2:  [92 %-98 %] 96 %  BP: (108-130)/(58-76) 113/59     Body mass index is 26.7 kg/m².    Date 02/13/24 0700 - 02/14/24 0659   Shift 7157-4558 4736-4510 7871-6855 24 Hour Total   INTAKE   Shift Total(mL/kg)       OUTPUT   Urine(mL/kg/hr) 600   600   Shift Total(mL/kg) 600(7.3)   600(7.3)   Weight (kg) 82 82 82 82          Drains       Drain  Duration                  Suprapubic Catheter 100% silicone 16 Fr. -- days         Nephrostomy 02/02/24 1255 Left 12 Fr. 8 days         Closed/Suction Drain 02/06/24 Tube - 1 Midline Abdomen Bulb 15 Fr. 5 days         Urostomy 02/06/24 1815 ureterostomy, left 4 days                     Physical Exam  Constitutional:       General: She is not in acute distress.     Appearance: Normal  "appearance.   HENT:      Head: Normocephalic and atraumatic.      Nose: Nose normal.   Eyes:      Conjunctiva/sclera: Conjunctivae normal.   Cardiovascular:      Rate and Rhythm: Normal rate.   Pulmonary:      Effort: Pulmonary effort is normal. No respiratory distress.   Abdominal:      Palpations: Abdomen is soft.      Comments: Incision CDI  Abdomen appropriately ttp. Soft, non-distended.   Urostomy in place, stent appears appropriately placed, urostomy with CYU output, no surrounding erythema/signs of dehiscence/signs of infection  Prior ileostomy site with packing in place, no significant discharge or purulence  L NT in place with cyu  ORESTES in place w/ minimal ss output    Musculoskeletal:         General: No deformity.   Skin:     General: Skin is warm and dry.   Neurological:      General: No focal deficit present.      Mental Status: She is alert.   Psychiatric:         Mood and Affect: Mood normal.         Behavior: Behavior normal.           Significant Labs:    BMP:  Recent Labs   Lab 02/11/24  0501 02/12/24  0521 02/13/24  0429    139 138   K 3.4* 4.0 3.9    103 101   CO2 35* 30* 30*   BUN 12 11 12   CREATININE 0.9 0.8 0.8   CALCIUM 8.4* 8.1* 8.3*         CBC:   Recent Labs   Lab 02/11/24  0501 02/12/24  0521 02/13/24  0429   WBC 7.68 8.10 8.31   HGB 8.4* 8.8* 8.9*   HCT 28.6* 29.7* 29.5*    220 249         Urine Studies: No results for input(s): "COLORU", "APPEARANCEUA", "PHUR", "SPECGRAV", "PROTEINUA", "GLUCUA", "KETONESU", "BILIRUBINUA", "OCCULTUA", "NITRITE", "UROBILINOGEN", "LEUKOCYTESUR", "RBCUA", "WBCUA", "BACTERIA", "SQUAMEPITHEL", "HYALINECASTS" in the last 168 hours.    Invalid input(s): "WRIGHTSUR"  All pertinent labs results from the past 24 hours have been reviewed.    Significant Imaging:  All pertinent imaging results/findings from the past 24 hours have been reviewed.    Review of Systems    Assessment/Plan:     Ileostomy in place  Editaanamaria Riley is a 59 y.o. " female with a history of cervical cancer s/p XRT complicated by end-stage bladder and distal ureteral strictures. She underwent urinary diversion with a transverse colon conduit in 2020 complicated by a bowel perforation resulting in an ileostomy creation for GI diversion. She is now s/p surgical repair of her left ureterocolic anastomotic stricture and ileostomy reversal on 2/6/24.       - Pain - MMPC  - Diet - advance to CLD  - Continue TPN, reordered  - mIVF: discontinued  - maintain drains   - Nausea control w/ PRN antiemetics   - OOB, ambulate 5x daily    - PT/OT  - Encourage IS  - DVT ppx: sqh   - daily labs  - restarting home meds as appropriate   - Replete lytes PRN  - Please include nursing notes for shift, patient may be unreliable historian to surgical team on morning rounds     - Dispo: continue inpatient care           VTE Risk Mitigation (From admission, onward)           Ordered     heparin (porcine) injection 5,000 Units  Every 8 hours         02/06/24 1725     Place ANANT hose  Until discontinued         02/06/24 0526     Place sequential compression device  Until discontinued         02/06/24 0526                    Karina Beverly MD  Urology  [facility]

## 2024-02-13 NOTE — ASSESSMENT & PLAN NOTE
Edita Riley is a 59 y.o. year old female with history of ureteral stenosis after radiation for cervical cancer requiring urostomy (transverse colon conduit) complicated by anastomotic leak requiring right colectomy and end ileostomy now s/p ex lap, extensive maliha, revision of urostomy, small bowel resection, ileostomy reversal w/ ileosigmoid anastomosis on 2/6    No clinically improved with return of bowel function. No N/V.      Plan:  Ok to advance diet as tolerated  Would recommend 1 more day of TPN, then wean to off  Ambulate, IS  Trending CRP  Rest per primary

## 2024-02-13 NOTE — ASSESSMENT & PLAN NOTE
Edita Riley is a 59 y.o. female with a history of cervical cancer s/p XRT complicated by end-stage bladder and distal ureteral strictures. She underwent urinary diversion with a transverse colon conduit in 2020 complicated by a bowel perforation resulting in an ileostomy creation for GI diversion. She is now s/p surgical repair of her left ureterocolic anastomotic stricture and ileostomy reversal on 2/6/24.       - Pain - MMPC  - Diet - advance to CLD  - Continue TPN, reordered  - mIVF: discontinued  - maintain drains   - Nausea control w/ PRN antiemetics   - OOB, ambulate 5x daily    - PT/OT  - Encourage IS  - DVT ppx: sqh   - daily labs  - restarting home meds as appropriate   - Replete lytes PRN  - Please include nursing notes for shift, patient may be unreliable historian to surgical team on morning rounds     - Dispo: continue inpatient care

## 2024-02-14 LAB
ANION GAP SERPL CALC-SCNC: 6 MMOL/L (ref 8–16)
BASOPHILS # BLD AUTO: 0.03 K/UL (ref 0–0.2)
BASOPHILS NFR BLD: 0.3 % (ref 0–1.9)
BUN SERPL-MCNC: 12 MG/DL (ref 8–23)
CALCIUM SERPL-MCNC: 8.5 MG/DL (ref 8.7–10.5)
CHLORIDE SERPL-SCNC: 102 MMOL/L (ref 95–110)
CO2 SERPL-SCNC: 28 MMOL/L (ref 23–29)
CREAT SERPL-MCNC: 0.9 MG/DL (ref 0.5–1.4)
CRP SERPL-MCNC: 64.5 MG/L (ref 0–8.2)
DIFFERENTIAL METHOD BLD: ABNORMAL
EOSINOPHIL # BLD AUTO: 0.4 K/UL (ref 0–0.5)
EOSINOPHIL NFR BLD: 4 % (ref 0–8)
ERYTHROCYTE [DISTWIDTH] IN BLOOD BY AUTOMATED COUNT: 15.9 % (ref 11.5–14.5)
EST. GFR  (NO RACE VARIABLE): >60 ML/MIN/1.73 M^2
GLUCOSE SERPL-MCNC: 105 MG/DL (ref 70–110)
HCT VFR BLD AUTO: 29.4 % (ref 37–48.5)
HGB BLD-MCNC: 8.8 G/DL (ref 12–16)
IMM GRANULOCYTES # BLD AUTO: 0.08 K/UL (ref 0–0.04)
IMM GRANULOCYTES NFR BLD AUTO: 0.9 % (ref 0–0.5)
LYMPHOCYTES # BLD AUTO: 1.4 K/UL (ref 1–4.8)
LYMPHOCYTES NFR BLD: 16 % (ref 18–48)
MAGNESIUM SERPL-MCNC: 2.1 MG/DL (ref 1.6–2.6)
MCH RBC QN AUTO: 28.9 PG (ref 27–31)
MCHC RBC AUTO-ENTMCNC: 29.9 G/DL (ref 32–36)
MCV RBC AUTO: 96 FL (ref 82–98)
MONOCYTES # BLD AUTO: 1.1 K/UL (ref 0.3–1)
MONOCYTES NFR BLD: 12.2 % (ref 4–15)
NEUTROPHILS # BLD AUTO: 5.9 K/UL (ref 1.8–7.7)
NEUTROPHILS NFR BLD: 66.6 % (ref 38–73)
NRBC BLD-RTO: 0 /100 WBC
PHOSPHATE SERPL-MCNC: 3.6 MG/DL (ref 2.7–4.5)
PLATELET # BLD AUTO: 217 K/UL (ref 150–450)
PMV BLD AUTO: 9.8 FL (ref 9.2–12.9)
POTASSIUM SERPL-SCNC: 4.7 MMOL/L (ref 3.5–5.1)
RBC # BLD AUTO: 3.05 M/UL (ref 4–5.4)
SODIUM SERPL-SCNC: 136 MMOL/L (ref 136–145)
WBC # BLD AUTO: 8.8 K/UL (ref 3.9–12.7)

## 2024-02-14 PROCEDURE — 25000003 PHARM REV CODE 250: Performed by: UROLOGY

## 2024-02-14 PROCEDURE — 83735 ASSAY OF MAGNESIUM: CPT | Performed by: STUDENT IN AN ORGANIZED HEALTH CARE EDUCATION/TRAINING PROGRAM

## 2024-02-14 PROCEDURE — A4216 STERILE WATER/SALINE, 10 ML: HCPCS | Performed by: UROLOGY

## 2024-02-14 PROCEDURE — 25000003 PHARM REV CODE 250

## 2024-02-14 PROCEDURE — 63600175 PHARM REV CODE 636 W HCPCS: Performed by: STUDENT IN AN ORGANIZED HEALTH CARE EDUCATION/TRAINING PROGRAM

## 2024-02-14 PROCEDURE — 63600175 PHARM REV CODE 636 W HCPCS

## 2024-02-14 PROCEDURE — 80048 BASIC METABOLIC PNL TOTAL CA: CPT | Performed by: STUDENT IN AN ORGANIZED HEALTH CARE EDUCATION/TRAINING PROGRAM

## 2024-02-14 PROCEDURE — 86140 C-REACTIVE PROTEIN: CPT | Performed by: STUDENT IN AN ORGANIZED HEALTH CARE EDUCATION/TRAINING PROGRAM

## 2024-02-14 PROCEDURE — 20600001 HC STEP DOWN PRIVATE ROOM

## 2024-02-14 PROCEDURE — 85025 COMPLETE CBC W/AUTO DIFF WBC: CPT | Performed by: STUDENT IN AN ORGANIZED HEALTH CARE EDUCATION/TRAINING PROGRAM

## 2024-02-14 PROCEDURE — 97530 THERAPEUTIC ACTIVITIES: CPT | Mod: CO

## 2024-02-14 PROCEDURE — 84100 ASSAY OF PHOSPHORUS: CPT | Performed by: STUDENT IN AN ORGANIZED HEALTH CARE EDUCATION/TRAINING PROGRAM

## 2024-02-14 PROCEDURE — 97535 SELF CARE MNGMENT TRAINING: CPT | Mod: CO

## 2024-02-14 PROCEDURE — 63600175 PHARM REV CODE 636 W HCPCS: Performed by: PHYSICIAN ASSISTANT

## 2024-02-14 PROCEDURE — 25000003 PHARM REV CODE 250: Performed by: PHYSICIAN ASSISTANT

## 2024-02-14 RX ADMIN — Medication 10 ML: at 12:02

## 2024-02-14 RX ADMIN — Medication 10 ML: at 11:02

## 2024-02-14 RX ADMIN — Medication 10 ML: at 05:02

## 2024-02-14 RX ADMIN — OXYCODONE 5 MG: 5 TABLET ORAL at 08:02

## 2024-02-14 RX ADMIN — HEPARIN SODIUM 5000 UNITS: 5000 INJECTION INTRAVENOUS; SUBCUTANEOUS at 05:02

## 2024-02-14 RX ADMIN — HEPARIN SODIUM 5000 UNITS: 5000 INJECTION INTRAVENOUS; SUBCUTANEOUS at 02:02

## 2024-02-14 RX ADMIN — Medication 10 ML: at 06:02

## 2024-02-14 RX ADMIN — HEPARIN SODIUM 5000 UNITS: 5000 INJECTION INTRAVENOUS; SUBCUTANEOUS at 09:02

## 2024-02-14 RX ADMIN — ERTAPENEM 1 G: 1 INJECTION INTRAMUSCULAR; INTRAVENOUS at 02:02

## 2024-02-14 RX ADMIN — OXYCODONE HYDROCHLORIDE 10 MG: 10 TABLET ORAL at 02:02

## 2024-02-14 RX ADMIN — HYDROMORPHONE HYDROCHLORIDE 1 MG: 1 INJECTION, SOLUTION INTRAMUSCULAR; INTRAVENOUS; SUBCUTANEOUS at 07:02

## 2024-02-14 NOTE — ASSESSMENT & PLAN NOTE
Edita Riley is a 59 y.o. female with a history of cervical cancer s/p XRT complicated by end-stage bladder and distal ureteral strictures. She underwent urinary diversion with a transverse colon conduit in 2020 complicated by a bowel perforation resulting in an ileostomy creation for GI diversion. She is now s/p surgical repair of her left ureterocolic anastomotic stricture and ileostomy reversal on 2/6/24.       - Pain - MMPC  - Diet - continue CLD  - Continue TPN, reordered  - mIVF: discontinued  - maintain drains   - Nausea control w/ PRN antiemetics   - OOB, ambulate 5x daily    - PT/OT  - Encourage IS  - DVT ppx: sqh   - daily labs  - restarting home meds as appropriate   - Replete lytes PRN  - Please include nursing notes for shift, patient may be unreliable historian to surgical team on morning rounds     - Dispo: continue inpatient care

## 2024-02-14 NOTE — PROGRESS NOTES
[facility]  Colorectal Surgery  Progress Note    Patient Name: Edita Riley  MRN: [id number]  Admission Date: 2/6/2024  Hospital Length of Stay: 8 days  Attending Physician: Leslie Balbuena MD    Subjective:     Interval History: NAEO. Doing ok. Tolerating oral intake. +BM per patient.     Post-Op Info:  Procedure(s) (LRB):  REVISION, ANASTOMOSIS, URETEROILEAL (N/A)  OPEN LAPAROTOMY, EXPLORATORY (N/A)  CLOSURE, ILEOSTOMY (N/A)  ANASTOMOSIS, INTESTINE - Ileocolic anastomosis (N/A)  EXCISION, SMALL INTESTINE  LYSIS, ADHESIONS  LYSIS, ADHESIONS   8 Days Post-Op      Medications:  Continuous Infusions:   TPN ADULT CENTRAL LINE CUSTOM 50 mL/hr at 02/13/24 2222     Scheduled Meds:   acetaminophen  1,000 mg Intravenous Q8H    ertapenem (INVanz) IV (PEDS and ADULTS)  1 g Intravenous Q24H    heparin (porcine)  5,000 Units Subcutaneous Q8H    lipid (SMOFLIPID)  250 mL Intravenous Daily    sodium chloride 0.9%  10 mL Intravenous Q6H     PRN Meds:   HYDROmorphone    LIDOcaine HCL 10 mg/ml (1%)    melatonin    ondansetron    oxyCODONE    oxyCODONE    prochlorperazine    sodium chloride 0.9%    sodium chloride 0.9%        Objective:     Vital Signs (Most Recent):  Temp: 97.9 °F (36.6 °C) (02/14/24 0317)  Pulse: 86 (02/14/24 0317)  Resp: 18 (02/13/24 2237)  BP: 125/79 (02/14/24 0317)  SpO2: 96 % (02/14/24 0317) Vital Signs (24h Range):  Temp:  [97.9 °F (36.6 °C)-98.6 °F (37 °C)] 97.9 °F (36.6 °C)  Pulse:  [79-88] 86  Resp:  [12-18] 18  SpO2:  [95 %-97 %] 96 %  BP: (111-127)/(59-79) 125/79     Intake/Output - Last 3 Shifts         02/12 0700  02/13 0659 02/13 0700 02/14 0659    Other 650     Total Intake(mL/kg) 650 (7.9)     Urine (mL/kg/hr) 2725 (1.4) 3750 (1.9)    Drains 7.5 0    Stool 0     Total Output 2732.5 3750    Net -2082.5 -3750          Stool Occurrence 1 x              Physical Exam     Constitutional:       General: She is not in acute distress.  Cardiovascular:      Rate and Rhythm: Normal rate.       Pulses: Normal pulses.   Pulmonary:      Effort: Pulmonary effort is normal. No respiratory distress.   Abdominal:      General: There is no distension. Inicisons c/d/I, drain with SS output     Palpations: Abdomen is soft.      Tenderness:Minimally tender  Neurological:      General: No focal deficit present.      Mental Status: She is alert and oriented to person, place, and time.   Assessment/Plan:     Cervical cancer  Edita Riley is a 59 y.o. year old female with history of ureteral stenosis after radiation for cervical cancer requiring urostomy (transverse colon conduit) complicated by anastomotic leak requiring right colectomy and end ileostomy now s/p ex lap, extensive maliha, revision of urostomy, small bowel resection, ileostomy reversal w/ ileosigmoid anastomosis on 2/6    No clinically improved with return of bowel function. No N/V.      Plan:  Ok to advance diet as tolerated. Tolerating liquids, advance to solids  Having bowel function  Would consider stopping TPN  Ambulate, IS  Trending CRP  Rest per primary          Bill Mercado MD  Colorectal Surgery  [facility]

## 2024-02-14 NOTE — PROGRESS NOTES
[facility]  Urology  Progress Note    Patient Name: Edita Riley  MRN: [id number]  Admission Date: 2/6/2024  Hospital Length of Stay: 8 days  Code Status: Full Code   Attending Provider: Leslie Balbuena MD   Primary Care Physician: Trina Vu MD    Subjective:     HPI:  Edita Riley is a 58 y.o. female who presents with a history of radiation therapy for cervical cancer which was complicated by bilateral distal ureteral obstruction with bilateral hydronephrosis.  She is status post colon conduit which was performed in 2020.  This was complicated by large bowel perforation away from the anastamosis.  She has an ileostomy and a left percutaneous nephrostomy that was last exchanged on 10/26/2023.  She was admitted to the hospital on 12/7/2023 after her percutaneous nephrostomy tube was clogged and she had been feeling ill.  She was scheduled for revision but missed her appointment with Dr. Reynolds and had to be rescheduled.     Interval History: AFVSS. NAEO.  Passing gas and having bowel movements. Ambulated x3.        Review of Systems  Objective:     Temp:  [97.9 °F (36.6 °C)-98.6 °F (37 °C)] 97.9 °F (36.6 °C)  Pulse:  [79-88] 86  Resp:  [12-18] 18  SpO2:  [95 %-97 %] 96 %  BP: (111-127)/(59-79) 125/79     Body mass index is 26.7 kg/m².           Drains       Drain  Duration                  Suprapubic Catheter 100% silicone 16 Fr. -- days         Nephrostomy 02/02/24 1255 Left 12 Fr. 11 days         Closed/Suction Drain 02/06/24 Tube - 1 Midline Abdomen Bulb 15 Fr. 8 days         Urostomy 02/06/24 1815 ureterostomy, left 7 days                     Physical Exam  Constitutional:       General: She is not in acute distress.     Appearance: Normal appearance.   HENT:      Head: Normocephalic and atraumatic.      Nose: Nose normal.   Eyes:      Conjunctiva/sclera: Conjunctivae normal.   Cardiovascular:      Rate and Rhythm: Normal rate.   Pulmonary:      Effort: Pulmonary effort is  normal. No respiratory distress.   Abdominal:      Palpations: Abdomen is soft.      Comments: Incision CDI  Abdomen appropriately ttp. Soft, non-distended.   Urostomy in place, stent appears appropriately placed, urostomy with CYU output, no surrounding erythema/signs of dehiscence/signs of infection  Prior ileostomy site with packing in place, no significant discharge or purulence  L NT in place with cyu  ORESTES in place w/ minimal ss output    Musculoskeletal:         General: No deformity.   Skin:     General: Skin is warm and dry.   Neurological:      General: No focal deficit present.      Mental Status: She is alert.   Psychiatric:         Mood and Affect: Mood normal.         Behavior: Behavior normal.           Significant Labs:    BMP:  Recent Labs   Lab 02/11/24  0501 02/12/24  0521 02/13/24  0429    139 138   K 3.4* 4.0 3.9    103 101   CO2 35* 30* 30*   BUN 12 11 12   CREATININE 0.9 0.8 0.8   CALCIUM 8.4* 8.1* 8.3*       CBC:   Recent Labs   Lab 02/12/24  0521 02/13/24  0429 02/14/24  0556   WBC 8.10 8.31 8.80   HGB 8.8* 8.9* 8.8*   HCT 29.7* 29.5* 29.4*    249 217       All pertinent labs results from the past 24 hours have been reviewed.    Significant Imaging:  All pertinent imaging results/findings from the past 24 hours have been reviewed.                  Assessment/Plan:     Ileostomy in place  Edita Riley is a 59 y.o. female with a history of cervical cancer s/p XRT complicated by end-stage bladder and distal ureteral strictures. She underwent urinary diversion with a transverse colon conduit in 2020 complicated by a bowel perforation resulting in an ileostomy creation for GI diversion. She is now s/p surgical repair of her left ureterocolic anastomotic stricture and ileostomy reversal on 2/6/24.       - Pain - MMPC  - Diet - continue CLD  - Continue TPN, reordered  - mIVF: discontinued  - maintain drains   - Nausea control w/ PRN antiemetics   - OOB, ambulate 5x  daily    - PT/OT  - Encourage IS  - DVT ppx: sqh   - daily labs  - restarting home meds as appropriate   - Replete lytes PRN  - Please include nursing notes for shift, patient may be unreliable historian to surgical team on morning rounds     - Dispo: continue inpatient care           VTE Risk Mitigation (From admission, onward)           Ordered     heparin (porcine) injection 5,000 Units  Every 8 hours         02/06/24 1725     Place ANANT hose  Until discontinued         02/06/24 0526     Place sequential compression device  Until discontinued         02/06/24 0526                    Maryann Darnell MD  Urology  [facility]

## 2024-02-14 NOTE — PLAN OF CARE
[facility] Plan of Care Note    Dx Hydronephrosis of Left Kidney    Shift Events Diet change per MD order to Low fiber / Low residue    Goals of Care: pain management, monitor N/V, monitor drains    Neuro: AAO x 4    Vital Signs: VSS    Respiratory: RA    Diet: Clear Liquid    Is patient tolerating current diet? yes    GTTS: TPN @ 50    Urine Output/Bowel Movement: adequate urine output with urostomy and last bm 2-12-24    Drains/Tubes/Tube Feeds (include total output/shift): urostomy, nephrostomy, L ORESTES drain    Lines: R picc    Accuchecks: none    Skin: midline incision with staples HARPREET , L urostomy, L nephrostomy, L ORESTES drain, R side skin dry, R buttocks    Fall Risk Score: 10    Activity level? Standby assist     Any scheduled procedures? none    Any safety concerns? Fall precautions    Other: none    Problem: Adult Inpatient Plan of Care  Goal: Plan of Care Review  Outcome: Ongoing, Progressing  Goal: Patient-Specific Goal (Individualized)  Outcome: Ongoing, Progressing  Goal: Absence of Hospital-Acquired Illness or Injury  Outcome: Ongoing, Progressing  Goal: Optimal Comfort and Wellbeing  Outcome: Ongoing, Progressing  Goal: Readiness for Transition of Care  Outcome: Ongoing, Progressing     Problem: Fluid and Electrolyte Imbalance (Acute Kidney Injury/Impairment)  Goal: Fluid and Electrolyte Balance  Outcome: Ongoing, Progressing     Problem: Oral Intake Inadequate (Acute Kidney Injury/Impairment)  Goal: Optimal Nutrition Intake  Outcome: Ongoing, Progressing     Problem: Renal Function Impairment (Acute Kidney Injury/Impairment)  Goal: Effective Renal Function  Outcome: Ongoing, Progressing     Problem: Impaired Wound Healing  Goal: Optimal Wound Healing  Outcome: Ongoing, Progressing     Problem: Fall Injury Risk  Goal: Absence of Fall and Fall-Related Injury  Outcome: Ongoing, Progressing     Problem: Infection  Goal: Absence of Infection Signs and Symptoms  Outcome: Ongoing, Progressing

## 2024-02-14 NOTE — SUBJECTIVE & OBJECTIVE
Subjective:     Interval History: NAEO. Doing ok. Tolerating oral intake. +BM per patient.     Post-Op Info:  Procedure(s) (LRB):  REVISION, ANASTOMOSIS, URETEROILEAL (N/A)  OPEN LAPAROTOMY, EXPLORATORY (N/A)  CLOSURE, ILEOSTOMY (N/A)  ANASTOMOSIS, INTESTINE - Ileocolic anastomosis (N/A)  EXCISION, SMALL INTESTINE  LYSIS, ADHESIONS  LYSIS, ADHESIONS   8 Days Post-Op      Medications:  Continuous Infusions:   TPN ADULT CENTRAL LINE CUSTOM 50 mL/hr at 02/13/24 2222     Scheduled Meds:   acetaminophen  1,000 mg Intravenous Q8H    ertapenem (INVanz) IV (PEDS and ADULTS)  1 g Intravenous Q24H    heparin (porcine)  5,000 Units Subcutaneous Q8H    lipid (SMOFLIPID)  250 mL Intravenous Daily    sodium chloride 0.9%  10 mL Intravenous Q6H     PRN Meds:   HYDROmorphone    LIDOcaine HCL 10 mg/ml (1%)    melatonin    ondansetron    oxyCODONE    oxyCODONE    prochlorperazine    sodium chloride 0.9%    sodium chloride 0.9%        Objective:     Vital Signs (Most Recent):  Temp: 97.9 °F (36.6 °C) (02/14/24 0317)  Pulse: 86 (02/14/24 0317)  Resp: 18 (02/13/24 2237)  BP: 125/79 (02/14/24 0317)  SpO2: 96 % (02/14/24 0317) Vital Signs (24h Range):  Temp:  [97.9 °F (36.6 °C)-98.6 °F (37 °C)] 97.9 °F (36.6 °C)  Pulse:  [79-88] 86  Resp:  [12-18] 18  SpO2:  [95 %-97 %] 96 %  BP: (111-127)/(59-79) 125/79     Intake/Output - Last 3 Shifts         02/12 0700  02/13 0659 02/13 0700 02/14 0659    Other 650     Total Intake(mL/kg) 650 (7.9)     Urine (mL/kg/hr) 2725 (1.4) 3750 (1.9)    Drains 7.5 0    Stool 0     Total Output 2732.5 3750    Net -2082.5 -3750          Stool Occurrence 1 x              Physical Exam     Constitutional:       General: She is not in acute distress.  Cardiovascular:      Rate and Rhythm: Normal rate.      Pulses: Normal pulses.   Pulmonary:      Effort: Pulmonary effort is normal. No respiratory distress.   Abdominal:      General: There is no distension. Inicisons c/d/I, drain with SS output     Palpations:  Abdomen is soft.      Tenderness:Minimally tender  Neurological:      General: No focal deficit present.      Mental Status: She is alert and oriented to person, place, and time.

## 2024-02-14 NOTE — PROGRESS NOTES
[facility]  Wound Care    Patient Name:  Edita Riley   MRN:  [id number]  Date: 2/14/2024  Diagnosis: Hydronephrosis of left kidney    History:     Past Medical History:   Diagnosis Date    Abnormal mammogram 08/25/2020    Abnormal mammogram 08/25/2020    Acute blood loss anemia     Acute deep vein thrombosis (DVT) of lower extremity 12/09/2020    Advance care planning 04/30/2021    ODESSA (acute kidney injury) 03/21/2020    Anemia due to chronic blood loss     Anemia due to chronic kidney disease     Anemia due to chronic kidney disease 05/18/2020    Anxiety     Bilateral ureteral obstruction 09/11/2020    Bilious vomiting with nausea 06/11/2023    Cardiovascular event risk -- low 09/14/2015    ASCVD 10-year risk 1.9% (with optimal risk factors 1.3%) as of 9/14/2015     Cervical cancer 2014    Chronic back pain     Colostomy care     Deep vein thrombosis     Depression     Diarrhea due to malabsorption 11/14/2018    Difficult intubation     Discharge planning issues 04/30/2021    Disorder of kidney and ureter     DVT of lower extremity, bilateral 11/04/2020    Edema 04/10/2023    Emphysema of lung 04/10/2023    Fibromyalgia     Fungemia 09/27/2020    Generalized abdominal pain 08/25/2020    GERD (gastroesophageal reflux disease)     Hemifacial spasm 09/16/2015    Hiatal hernia 2014    History of cervical cancer 10/11/2018    History of essential hypertension 05/04/2015    Not requiring medications at this time  BP is low- concern that this may correlate with increased stool output from stoma. Encourage her to contact GI and her PCP.    Hx of psychiatric care     Cymbalta, trazodone    Hypertension     Hypomagnesemia 11/21/2018    Hyponatremia 09/10/2023    Impaired functional mobility, balance, gait, and endurance 07/24/2015    Lactose intolerance     Major depressive disorder, recurrent episode, moderate 06/02/2023    Metastatic squamous cell carcinoma to lymph node 10/11/2018    Moderate episode of  recurrent major depressive disorder 2021    Nephrostomy complication 10/26/2023    Neuropathy due to chemotherapeutic drug 2018    Osteoarthritis of back     Peritonitis 2020    Physical deconditioning 2021    Pseudomonas urinary tract infection 2021    Psychiatric problem     Pyelitis 2023    QT prolongation 2021    Refusal of blood transfusions as patient is Bahai     Estephanieki's ring 2015    Seen on outside EGD 2014, underwent esophageal dilatation. Bx were negative.     Seizure-like activity 2018    Seizures     Sleep stage dysfunction     Noted on PSG 2017; negative for obstructive sleep apnea     Stroke     Urinary tract infection associated with nephrostomy catheter 2020    Wound infection after surgery 2020       Social History     Socioeconomic History    Marital status:      Spouse name: Hammad    Number of children: 2   Tobacco Use    Smoking status: Never    Smokeless tobacco: Never   Substance and Sexual Activity    Alcohol use: No     Alcohol/week: 0.0 standard drinks of alcohol    Drug use: No    Sexual activity: Yes     Partners: Male     Birth control/protection: None     Comment:  19 years since    Social History Narrative    , twin daughters (1  2018), disabled due to childhood stroke, Bahai sophia     Social Determinants of Health     Financial Resource Strain: Medium Risk (2024)    Overall Financial Resource Strain (CARDIA)     Difficulty of Paying Living Expenses: Somewhat hard   Food Insecurity: No Food Insecurity (2024)    Hunger Vital Sign     Worried About Running Out of Food in the Last Year: Never true     Ran Out of Food in the Last Year: Never true   Transportation Needs: No Transportation Needs (2024)    PRAPARE - Transportation     Lack of Transportation (Medical): No     Lack of Transportation (Non-Medical): No   Physical Activity:  Insufficiently Active (2/7/2024)    Exercise Vital Sign     Days of Exercise per Week: 4 days     Minutes of Exercise per Session: 20 min   Stress: Stress Concern Present (2/7/2024)    [employer] - Occupational Stress Questionnaire     Feeling of Stress : To some extent   Social Connections: Moderately Integrated (2/7/2024)    Social Connection and Isolation Panel [NHANES]     Frequency of Communication with Friends and Family: Three times a week     Frequency of Social Gatherings with Friends and Family: Once a week     Attends Spiritism Services: 1 to 4 times per year     Active Member of Clubs or Organizations: No     Attends Club or Organization Meetings: Never     Marital Status:    Housing Stability: Low Risk  (2/7/2024)    Housing Stability Vital Sign     Unable to Pay for Housing in the Last Year: No     Number of Places Lived in the Last Year: 1     Unstable Housing in the Last Year: No       Precautions:     Allergies as of 09/22/2023 - Reviewed 09/21/2023   Allergen Reaction Noted    Bee sting [allergen ext-venom-honey bee]  05/04/2015    Grass pollen-bermuda, standard  05/04/2015       WOC Assessment Details/Treatment     Patient seen for wound care follow up for urostomy.   Reviewed chart for this encounter.   See Flow Sheet for findings.     Pt found sitting up in chair, agreeable to care at this time. Per pt, pt states she is independent w/ needs and has no questions or concerns addressed at this time. Supplies at bedside, will contine to follow for urostomy needs.     Bedside nursing to continue care & monitoring.  Bedside nursing to maintain pressure injury prevention interventions.     02/14/24 1226   WOCN Assessment   WOCN Total Time (mins) 15   Visit Date 02/14/24   Visit Time 1226   Consult Type Follow Up   WOCN Speciality Ostomy   WOCN List urostomy   Ostomy Type Urostomy   Procedure ostomy pouch   Intervention assessed;chart review;coordination of care    Teaching on-going

## 2024-02-14 NOTE — ASSESSMENT & PLAN NOTE
Edita Riley is a 59 y.o. year old female with history of ureteral stenosis after radiation for cervical cancer requiring urostomy (transverse colon conduit) complicated by anastomotic leak requiring right colectomy and end ileostomy now s/p ex lap, extensive maliha, revision of urostomy, small bowel resection, ileostomy reversal w/ ileosigmoid anastomosis on 2/6    No clinically improved with return of bowel function. No N/V.      Plan:  Ok to advance diet as tolerated. Tolerating liquids, advance to solids  Having bowel function  Would consider stopping TPN  Ambulate, IS  Trending CRP  Rest per primary

## 2024-02-14 NOTE — PT/OT/SLP PROGRESS
"Occupational Therapy   Treatment    Name: Edita Riley  MRN: [id number]  Admitting Diagnosis:  Hydronephrosis of left kidney  8 Days Post-Op    Recommendations:     Discharge Recommendations: Low Intensity Therapy  Discharge Equipment Recommendations:  none  Barriers to discharge:  None    Assessment:     Edita Riley is a 59 y.o. female with a medical diagnosis of Hydronephrosis of left kidney.  She presents with the fallowing performance deficits affecting function are weakness, impaired endurance, impaired self care skills, impaired functional mobility, gait instability, impaired balance, decreased safety awareness, decreased lower extremity function. Patient is motivated to participate with therapy intervention, patient is demonstrating progress with functional, endurance, transfers, mobility, and self-care task. Patient would benefit from Low intensity therapy intervention to in order to gain functional independence with mobility and ADLs due to patient functioning below level of baseline 2/2 surgical procedure.    Rehab Prognosis:  Good; patient would benefit from acute skilled OT services to address these deficits and reach maximum level of function.       Plan:     Patient to be seen 4 x/week to address the above listed problems via self-care/home management, therapeutic activities, therapeutic exercises  Plan of Care Expires: 02/08/24  Plan of Care Reviewed with: patient    Subjective     Chief Complaint: none  Patient/Family Comments/goals: " I want to get stronger to get out of this hospital and go home"  Pain/Comfort:  Pain Rating 1: 0/10  Pain Rating Post-Intervention 1: 0/10    Objective:     Communicated with: Nurse prior to session.  Patient found sitting edge of bed with peripheral IV, nephrostomy, SCD upon OT entry to room.    General Precautions: Standard, fall    Orthopedic Precautions:N/A  Braces: N/A  Respiratory Status: Room air     Occupational Performance: "     Functional Mobility/Transfers:  Patient completed Sit <> Stand Transfer from EOB with supervision  with  rolling walker   Functional Mobility: Patient ambulated community distance with SBA with RW and one seated rest break.     Activities of Daily Living:  Grooming: modified independence to complete an oral care task in standing at the sink.  Upper Body Dressing: moderate assistance to don/doff back gown for line mgmt.    [facility] 6 Click ADL: 18    Treatment & Education:  HARPREET educated and train patient on resistance exercises for B UE to impact with strength and endurance.  Educated and remind patient to call for assistance prior to transfers to reduce the risk for falls.  Addressed patient's questions and concerns within HUGHES scope of practice.    Patient left up in chair with all lines intact and call button in reach    GOALS:   Multidisciplinary Problems       Occupational Therapy Goals          Problem: Occupational Therapy    Goal Priority Disciplines Outcome Interventions   Occupational Therapy Goal     OT, PT/OT Ongoing, Progressing    Description: Goals to be met by: 2/14/24    Patient will increase functional independence with ADLs by performing:    LE Dressing with Stand-by Assistance.  Grooming while standing at sink with Set-up Assistance.  Toileting from toilet with Stand-by Assistance for hygiene and clothing management.   Supine to sit with Supervision.  Toilet transfer to toilet with Supervision.                         Time Tracking:     OT Date of Treatment: 02/14/24  OT Start Time: 1328  OT Stop Time: 1400  OT Total Time (min): 32 min    Billable Minutes:Self Care/Home Management 20  Therapeutic Activity 12    OT/HARPREET: HARPREET     Number of HARPREET visits since last OT visit: 1 2/14/2024

## 2024-02-14 NOTE — SUBJECTIVE & OBJECTIVE
Interval History: AFVSS. NAEO.  Passing gas and having bowel movements. Ambulated x3.        Review of Systems  Objective:     Temp:  [97.9 °F (36.6 °C)-98.6 °F (37 °C)] 97.9 °F (36.6 °C)  Pulse:  [79-88] 86  Resp:  [12-18] 18  SpO2:  [95 %-97 %] 96 %  BP: (111-127)/(59-79) 125/79     Body mass index is 26.7 kg/m².           Drains       Drain  Duration                  Suprapubic Catheter 100% silicone 16 Fr. -- days         Nephrostomy 02/02/24 1255 Left 12 Fr. 11 days         Closed/Suction Drain 02/06/24 Tube - 1 Midline Abdomen Bulb 15 Fr. 8 days         Urostomy 02/06/24 1815 ureterostomy, left 7 days                     Physical Exam  Constitutional:       General: She is not in acute distress.     Appearance: Normal appearance.   HENT:      Head: Normocephalic and atraumatic.      Nose: Nose normal.   Eyes:      Conjunctiva/sclera: Conjunctivae normal.   Cardiovascular:      Rate and Rhythm: Normal rate.   Pulmonary:      Effort: Pulmonary effort is normal. No respiratory distress.   Abdominal:      Palpations: Abdomen is soft.      Comments: Incision CDI  Abdomen appropriately ttp. Soft, non-distended.   Urostomy in place, stent appears appropriately placed, urostomy with CYU output, no surrounding erythema/signs of dehiscence/signs of infection  Prior ileostomy site with packing in place, no significant discharge or purulence  L NT in place with cyu  ORESTES in place w/ minimal ss output    Musculoskeletal:         General: No deformity.   Skin:     General: Skin is warm and dry.   Neurological:      General: No focal deficit present.      Mental Status: She is alert.   Psychiatric:         Mood and Affect: Mood normal.         Behavior: Behavior normal.           Significant Labs:    BMP:  Recent Labs   Lab 02/11/24  0501 02/12/24  0521 02/13/24  0429    139 138   K 3.4* 4.0 3.9    103 101   CO2 35* 30* 30*   BUN 12 11 12   CREATININE 0.9 0.8 0.8   CALCIUM 8.4* 8.1* 8.3*       CBC:   Recent Labs    Lab 02/12/24  0521 02/13/24  0429 02/14/24  0556   WBC 8.10 8.31 8.80   HGB 8.8* 8.9* 8.8*   HCT 29.7* 29.5* 29.4*    249 217       All pertinent labs results from the past 24 hours have been reviewed.    Significant Imaging:  All pertinent imaging results/findings from the past 24 hours have been reviewed.

## 2024-02-15 LAB
ANION GAP SERPL CALC-SCNC: 6 MMOL/L (ref 8–16)
BASOPHILS # BLD AUTO: 0.03 K/UL (ref 0–0.2)
BASOPHILS NFR BLD: 0.3 % (ref 0–1.9)
BUN SERPL-MCNC: 10 MG/DL (ref 8–23)
CALCIUM SERPL-MCNC: 8.6 MG/DL (ref 8.7–10.5)
CHLORIDE SERPL-SCNC: 102 MMOL/L (ref 95–110)
CO2 SERPL-SCNC: 31 MMOL/L (ref 23–29)
CREAT SERPL-MCNC: 1.1 MG/DL (ref 0.5–1.4)
CRP SERPL-MCNC: 83.2 MG/L (ref 0–8.2)
DIFFERENTIAL METHOD BLD: ABNORMAL
EOSINOPHIL # BLD AUTO: 0.4 K/UL (ref 0–0.5)
EOSINOPHIL NFR BLD: 4.2 % (ref 0–8)
ERYTHROCYTE [DISTWIDTH] IN BLOOD BY AUTOMATED COUNT: 16.1 % (ref 11.5–14.5)
EST. GFR  (NO RACE VARIABLE): 57.2 ML/MIN/1.73 M^2
GLUCOSE SERPL-MCNC: 92 MG/DL (ref 70–110)
HCT VFR BLD AUTO: 27.8 % (ref 37–48.5)
HGB BLD-MCNC: 8.3 G/DL (ref 12–16)
IMM GRANULOCYTES # BLD AUTO: 0.06 K/UL (ref 0–0.04)
IMM GRANULOCYTES NFR BLD AUTO: 0.7 % (ref 0–0.5)
LYMPHOCYTES # BLD AUTO: 1.8 K/UL (ref 1–4.8)
LYMPHOCYTES NFR BLD: 19.6 % (ref 18–48)
MAGNESIUM SERPL-MCNC: 2.1 MG/DL (ref 1.6–2.6)
MCH RBC QN AUTO: 28.8 PG (ref 27–31)
MCHC RBC AUTO-ENTMCNC: 29.9 G/DL (ref 32–36)
MCV RBC AUTO: 97 FL (ref 82–98)
MONOCYTES # BLD AUTO: 1.3 K/UL (ref 0.3–1)
MONOCYTES NFR BLD: 14.2 % (ref 4–15)
NEUTROPHILS # BLD AUTO: 5.6 K/UL (ref 1.8–7.7)
NEUTROPHILS NFR BLD: 61 % (ref 38–73)
NRBC BLD-RTO: 0 /100 WBC
PHOSPHATE SERPL-MCNC: 4.4 MG/DL (ref 2.7–4.5)
PLATELET # BLD AUTO: 263 K/UL (ref 150–450)
PMV BLD AUTO: 10.2 FL (ref 9.2–12.9)
POTASSIUM SERPL-SCNC: 4.7 MMOL/L (ref 3.5–5.1)
RBC # BLD AUTO: 2.88 M/UL (ref 4–5.4)
SODIUM SERPL-SCNC: 139 MMOL/L (ref 136–145)
WBC # BLD AUTO: 9.14 K/UL (ref 3.9–12.7)

## 2024-02-15 PROCEDURE — 97116 GAIT TRAINING THERAPY: CPT | Mod: CQ

## 2024-02-15 PROCEDURE — 97530 THERAPEUTIC ACTIVITIES: CPT | Mod: CQ

## 2024-02-15 PROCEDURE — 97110 THERAPEUTIC EXERCISES: CPT | Mod: CO

## 2024-02-15 PROCEDURE — 25000003 PHARM REV CODE 250: Performed by: UROLOGY

## 2024-02-15 PROCEDURE — 85025 COMPLETE CBC W/AUTO DIFF WBC: CPT | Performed by: STUDENT IN AN ORGANIZED HEALTH CARE EDUCATION/TRAINING PROGRAM

## 2024-02-15 PROCEDURE — A4216 STERILE WATER/SALINE, 10 ML: HCPCS | Performed by: UROLOGY

## 2024-02-15 PROCEDURE — 97535 SELF CARE MNGMENT TRAINING: CPT | Mod: CO

## 2024-02-15 PROCEDURE — 80048 BASIC METABOLIC PNL TOTAL CA: CPT | Performed by: STUDENT IN AN ORGANIZED HEALTH CARE EDUCATION/TRAINING PROGRAM

## 2024-02-15 PROCEDURE — 83735 ASSAY OF MAGNESIUM: CPT | Performed by: STUDENT IN AN ORGANIZED HEALTH CARE EDUCATION/TRAINING PROGRAM

## 2024-02-15 PROCEDURE — 86140 C-REACTIVE PROTEIN: CPT | Performed by: STUDENT IN AN ORGANIZED HEALTH CARE EDUCATION/TRAINING PROGRAM

## 2024-02-15 PROCEDURE — 25000003 PHARM REV CODE 250

## 2024-02-15 PROCEDURE — 84100 ASSAY OF PHOSPHORUS: CPT | Performed by: STUDENT IN AN ORGANIZED HEALTH CARE EDUCATION/TRAINING PROGRAM

## 2024-02-15 PROCEDURE — 20600001 HC STEP DOWN PRIVATE ROOM

## 2024-02-15 PROCEDURE — 97530 THERAPEUTIC ACTIVITIES: CPT | Mod: CO

## 2024-02-15 PROCEDURE — 63600175 PHARM REV CODE 636 W HCPCS: Performed by: STUDENT IN AN ORGANIZED HEALTH CARE EDUCATION/TRAINING PROGRAM

## 2024-02-15 RX ORDER — IBUPROFEN 600 MG/1
600 TABLET ORAL EVERY 6 HOURS PRN
Status: DISCONTINUED | OUTPATIENT
Start: 2024-02-15 | End: 2024-02-18 | Stop reason: HOSPADM

## 2024-02-15 RX ORDER — ACETAMINOPHEN 500 MG
1000 TABLET ORAL EVERY 8 HOURS
Status: DISCONTINUED | OUTPATIENT
Start: 2024-02-15 | End: 2024-02-18 | Stop reason: HOSPADM

## 2024-02-15 RX ORDER — OXYCODONE HYDROCHLORIDE 5 MG/1
5 TABLET ORAL EVERY 6 HOURS PRN
Status: DISCONTINUED | OUTPATIENT
Start: 2024-02-15 | End: 2024-02-18 | Stop reason: HOSPADM

## 2024-02-15 RX ADMIN — HEPARIN SODIUM 5000 UNITS: 5000 INJECTION INTRAVENOUS; SUBCUTANEOUS at 09:02

## 2024-02-15 RX ADMIN — Medication 10 ML: at 11:02

## 2024-02-15 RX ADMIN — HEPARIN SODIUM 5000 UNITS: 5000 INJECTION INTRAVENOUS; SUBCUTANEOUS at 02:02

## 2024-02-15 RX ADMIN — ACETAMINOPHEN 1000 MG: 500 TABLET ORAL at 02:02

## 2024-02-15 RX ADMIN — ACETAMINOPHEN 1000 MG: 500 TABLET ORAL at 09:02

## 2024-02-15 RX ADMIN — Medication 10 ML: at 06:02

## 2024-02-15 RX ADMIN — Medication 10 ML: at 07:02

## 2024-02-15 RX ADMIN — HEPARIN SODIUM 5000 UNITS: 5000 INJECTION INTRAVENOUS; SUBCUTANEOUS at 06:02

## 2024-02-15 NOTE — PT/OT/SLP PROGRESS
Physical Therapy Treatment    Patient Name:  Edita Riley   MRN:  [id number]    Recommendations:     Discharge Recommendations: Low Intensity Therapy  Discharge Equipment Recommendations: none  Barriers to discharge: None    Assessment:     Edita Riley is a 59 y.o. female admitted with a medical diagnosis of Hydronephrosis of left kidney.  She presents with the following impairments/functional limitations: weakness, impaired endurance, impaired self care skills, impaired functional mobility, gait instability, impaired balance . Pt was motivated and cooperative with treatment session. Pt Progressing with PT Intervention. Pt Progressing with improving gait distance. Pt would continue to benefit from skilled PT to address overall functional mobility, goals , and to return to functional baseline.  Goals remain appropriate.    Rehab Prognosis: Good; patient would benefit from acute skilled PT services to address these deficits and reach maximum level of function.    Recent Surgery: Procedure(s) (LRB):  REVISION, ANASTOMOSIS, URETEROILEAL (N/A)  OPEN LAPAROTOMY, EXPLORATORY (N/A)  CLOSURE, ILEOSTOMY (N/A)  ANASTOMOSIS, INTESTINE - Ileocolic anastomosis (N/A)  EXCISION, SMALL INTESTINE  LYSIS, ADHESIONS  LYSIS, ADHESIONS 9 Days Post-Op    Plan:     During this hospitalization, patient to be seen 4 x/week to address the identified rehab impairments via gait training, therapeutic activities, therapeutic exercises and progress toward the following goals:    Plan of Care Expires:  03/08/24    Subjective     Chief Complaint: no c/o    Pain/Comfort:  Pain Rating 1: 0/10  Location 1: abdomen  Pain Addressed 1: Pre-medicate for activity, Reposition, Distraction      Objective:     Communicated with RN prior to session.  Patient found HOB elevated with  (peripheral IV; nephrostomy; SCD) upon PT entry to room.     General Precautions: Standard, fall  Orthopedic Precautions: N/A  Braces: N/A  Respiratory  Status: Room air     Functional Mobility:  Bed Mobility:     Rolling Right: stand by assistance  Scooting: stand by assistance  Supine to Sit: stand by assistance  Transfers:     Sit to Stand:  stand by assistance with rolling walker  Gait: 200' x 2  with RW and SBA with slow, steady gait without LOB. Sitting rest break between trials  Stairs:  Pt ascended/descended 6 stair(s) with No Assistive Device with left handrail and HHA with Contact Guard Assistance/SBA.       AM-PAC 6 CLICK MOBILITY  Turning over in bed (including adjusting bedclothes, sheets and blankets)?: 3  Sitting down on and standing up from a chair with arms (e.g., wheelchair, bedside commode, etc.): 3  Moving from lying on back to sitting on the side of the bed?: 3  Moving to and from a bed to a chair (including a wheelchair)?: 3  Need to walk in hospital room?: 3  Climbing 3-5 steps with a railing?: 3  Basic Mobility Total Score: 18       Treatment & Education:  Therapist provided instruction and educated of  patient on progress, safety,d/c,PT POC,   proper body mechanics, energy conservation, and fall prevention strategies during tasks listed above, on the effects of prolonged immobility and the importance of performing OOB activity and exercises to promote healing and reduce recovery time      Updated white board with appropriate PT mobility information for medical team notification   Donned an extra gown   Pt safe to ambulate in hallway with RN or PCT assistance  Call nursing/pct to transfer to chair/use bathroom. Pt stated understanding  Bedside table in front of patient and area set up for function, convenience, and safety. RN aware of patient's mobility needs and status. Questions/concerns addressed within PTA scope of practice; patient  with no further questions. Time was provided for active listening, discussion of health disposition, and discussion of safe discharge. Pt?verbalized?agreement .     Patient left up in chair with all lines  intact, call button in reach, and nsg notified.  GOALS:   Multidisciplinary Problems       Physical Therapy Goals          Problem: Physical Therapy    Goal Priority Disciplines Outcome Goal Variances Interventions   Physical Therapy Goal     PT, PT/OT Ongoing, Progressing     Description: Goals to be met by: 2024     Patient will increase functional independence with mobility by performin. Supine to sit with Set-up [place]  2. Sit to supine with Set-up [place]  3. Sit to stand transfer with Supervision  4. Bed to chair transfer with Supervision using Rolling Walker  5. Gait  x 250 feet with Supervision using Rolling Walker.   6. Ascend/descend 4 stair with left Handrails Stand-by Assistance using No Assistive Device.   7. Lower extremity exercise program x15 reps per handout, with supervision                         Time Tracking:     PT Received On: 02/15/24  PT Start Time: 937     PT Stop Time: 1001  PT Total Time (min): 24 min     Billable Minutes: Gait Training 15 and Therapeutic Activity 9    Treatment Type: Treatment  PT/PTA: PTA     Number of PTA visits since last PT visit: 2     02/15/2024

## 2024-02-15 NOTE — PROGRESS NOTES
"[facility]  Urology  Progress Note    Patient Name: Edita Riley  MRN: [id number]  Admission Date: 2/6/2024  Hospital Length of Stay: 9 days  Code Status: Full Code   Attending Provider: Leslie Balbuena MD   Primary Care Physician: Trina Vu MD    Subjective:     HPI:  Edita Riley is a 58 y.o. female who presents with a history of radiation therapy for cervical cancer which was complicated by bilateral distal ureteral obstruction with bilateral hydronephrosis.  She is status post colon conduit which was performed in 2020.  This was complicated by large bowel perforation away from the anastamosis.  She has an ileostomy and a left percutaneous nephrostomy that was last exchanged on 10/26/2023.  She was admitted to the hospital on 12/7/2023 after her percutaneous nephrostomy tube was clogged and she had been feeling ill.  She was scheduled for revision but missed her appointment with Dr. Reynolds and had to be rescheduled.     Interval History: IVETTE, AFVSS, reports feeling better today, tolerated diet yesterday said felt hungry and "better with food in my stomach." Has continued to ambulate, not in pain, will plan to advance diet and stop TPN today. Potential dc today vs tomorrow pending diet advancement tolerance.       Objective:     Temp:  [98 °F (36.7 °C)-98.8 °F (37.1 °C)] 98.4 °F (36.9 °C)  Pulse:  [87-95] 89  Resp:  [16-20] 18  SpO2:  [95 %-97 %] 96 %  BP: (100-117)/(56-78) 117/73     Body mass index is 26.7 kg/m².           Drains       Drain  Duration                  Suprapubic Catheter 100% silicone 16 Fr. -- days         Nephrostomy 02/02/24 1255 Left 12 Fr. 12 days         Closed/Suction Drain 02/06/24 Tube - 1 Midline Abdomen Bulb 15 Fr. 9 days         Urostomy 02/06/24 1815 ureterostomy, left 8 days                     Physical Exam  Constitutional:       General: She is not in acute distress.     Appearance: Normal appearance.   HENT:      Head: Normocephalic and " atraumatic.      Nose: Nose normal.   Eyes:      Conjunctiva/sclera: Conjunctivae normal.   Cardiovascular:      Rate and Rhythm: Normal rate.   Pulmonary:      Effort: Pulmonary effort is normal. No respiratory distress.   Abdominal:      Palpations: Abdomen is soft.      Comments: Incision CDI  Abdomen appropriately ttp. Soft, non-distended.   Urostomy in place, stent appears appropriately placed, urostomy with CYU output, no surrounding erythema/signs of dehiscence/signs of infection  Prior ileostomy site with packing in place, no significant discharge or purulence  L NT in place with cyu  ORESTES in place w/ minimal ss output    Musculoskeletal:         General: No deformity.   Skin:     General: Skin is warm and dry.   Neurological:      General: No focal deficit present.      Mental Status: She is alert.   Psychiatric:         Mood and Affect: Mood normal.         Behavior: Behavior normal.           Significant Labs:    BMP:  Recent Labs   Lab 02/12/24  0521 02/13/24 0429 02/14/24  0553    138 136   K 4.0 3.9 4.7    101 102   CO2 30* 30* 28   BUN 11 12 12   CREATININE 0.8 0.8 0.9   CALCIUM 8.1* 8.3* 8.5*       CBC:   Recent Labs   Lab 02/12/24  0521 02/13/24  0429 02/14/24  0556   WBC 8.10 8.31 8.80   HGB 8.8* 8.9* 8.8*   HCT 29.7* 29.5* 29.4*    249 217       All pertinent labs results from the past 24 hours have been reviewed.    Significant Imaging:  All pertinent imaging results/findings from the past 24 hours have been reviewed.                  Assessment/Plan:     Ileostomy in place  Edita Riley is a 59 y.o. female with a history of cervical cancer s/p XRT complicated by end-stage bladder and distal ureteral strictures. She underwent urinary diversion with a transverse colon conduit in 2020 complicated by a bowel perforation resulting in an ileostomy creation for GI diversion. She is now s/p surgical repair of her left ureterocolic anastomotic stricture and ileostomy  reversal on 2/6/24.       - Pain - MMPC  - Diet - will advance to reg diet  - Will stop TPN  - maintain drains, will dc pending discharge   - Nausea control w/ PRN antiemetics   - OOB, ambulate 5x daily    - PT/OT  - Encourage IS  - DVT ppx: sqh   - daily labs  - restarting home meds as appropriate   - Replete lytes PRN  - Please include nursing notes for shift, patient may be unreliable historian to surgical team on morning rounds   - needs home health wound care  - will consult ostomy nurse   - f/u ID recs for abx, may be able to dc PICC    - Dispo: potential dc home today vs tomorrow           VTE Risk Mitigation (From admission, onward)           Ordered     heparin (porcine) injection 5,000 Units  Every 8 hours         02/06/24 1725     Place ANANT hose  Until discontinued         02/06/24 0526     Place sequential compression device  Until discontinued         02/06/24 0526                    Marlo Gabriel MD  Urology  [facility]

## 2024-02-15 NOTE — SUBJECTIVE & OBJECTIVE
"Interval History: NAOE, AFVSS, reports feeling better today, tolerated diet yesterday said felt hungry and "better with food in my stomach." Has continued to ambulate, not in pain, will plan to advance diet and stop TPN today. Potential dc today vs tomorrow pending diet advancement tolerance.       Objective:     Temp:  [98 °F (36.7 °C)-98.8 °F (37.1 °C)] 98.4 °F (36.9 °C)  Pulse:  [87-95] 89  Resp:  [16-20] 18  SpO2:  [95 %-97 %] 96 %  BP: (100-117)/(56-78) 117/73     Body mass index is 26.7 kg/m².           Drains       Drain  Duration                  Suprapubic Catheter 100% silicone 16 Fr. -- days         Nephrostomy 02/02/24 1255 Left 12 Fr. 12 days         Closed/Suction Drain 02/06/24 Tube - 1 Midline Abdomen Bulb 15 Fr. 9 days         Urostomy 02/06/24 1815 ureterostomy, left 8 days                     Physical Exam  Constitutional:       General: She is not in acute distress.     Appearance: Normal appearance.   HENT:      Head: Normocephalic and atraumatic.      Nose: Nose normal.   Eyes:      Conjunctiva/sclera: Conjunctivae normal.   Cardiovascular:      Rate and Rhythm: Normal rate.   Pulmonary:      Effort: Pulmonary effort is normal. No respiratory distress.   Abdominal:      Palpations: Abdomen is soft.      Comments: Incision CDI  Abdomen appropriately ttp. Soft, non-distended.   Urostomy in place, stent appears appropriately placed, urostomy with CYU output, no surrounding erythema/signs of dehiscence/signs of infection  Prior ileostomy site with packing in place, no significant discharge or purulence  L NT in place with cyu  ORESTES in place w/ minimal ss output    Musculoskeletal:         General: No deformity.   Skin:     General: Skin is warm and dry.   Neurological:      General: No focal deficit present.      Mental Status: She is alert.   Psychiatric:         Mood and Affect: Mood normal.         Behavior: Behavior normal.           Significant Labs:    BMP:  Recent Labs   Lab 02/12/24  0521 " 02/13/24  0429 02/14/24  0553    138 136   K 4.0 3.9 4.7    101 102   CO2 30* 30* 28   BUN 11 12 12   CREATININE 0.8 0.8 0.9   CALCIUM 8.1* 8.3* 8.5*       CBC:   Recent Labs   Lab 02/12/24  0521 02/13/24  0429 02/14/24  0556   WBC 8.10 8.31 8.80   HGB 8.8* 8.9* 8.8*   HCT 29.7* 29.5* 29.4*    249 217       All pertinent labs results from the past 24 hours have been reviewed.    Significant Imaging:  All pertinent imaging results/findings from the past 24 hours have been reviewed.

## 2024-02-15 NOTE — ASSESSMENT & PLAN NOTE
Edita Riley is a 59 y.o. year old female with history of ureteral stenosis after radiation for cervical cancer requiring urostomy (transverse colon conduit) complicated by anastomotic leak requiring right colectomy and end ileostomy now s/p ex lap, extensive maliha, revision of urostomy, small bowel resection, ileostomy reversal w/ ileosigmoid anastomosis on 2/6    No clinically improved with return of bowel function. No N/V.      Plan:  Tolerating solids  Having bowel function  Ambulate, IS  CRP trending up last two days. Would monitor one more day. If continues to trend up would scan tomorrow to evaluate for infectious source  Rest per primary

## 2024-02-15 NOTE — SUBJECTIVE & OBJECTIVE
Subjective:     Interval History: NAEO. Feeling well. +BM. Tolerating diet.     Post-Op Info:  Procedure(s) (LRB):  REVISION, ANASTOMOSIS, URETEROILEAL (N/A)  OPEN LAPAROTOMY, EXPLORATORY (N/A)  CLOSURE, ILEOSTOMY (N/A)  ANASTOMOSIS, INTESTINE - Ileocolic anastomosis (N/A)  EXCISION, SMALL INTESTINE  LYSIS, ADHESIONS  LYSIS, ADHESIONS   9 Days Post-Op      Medications:  Continuous Infusions:  Scheduled Meds:   acetaminophen  1,000 mg Oral Q8H    ertapenem (INVanz) IV (PEDS and ADULTS)  1 g Intravenous Q24H    heparin (porcine)  5,000 Units Subcutaneous Q8H    sodium chloride 0.9%  10 mL Intravenous Q6H     PRN Meds:   ibuprofen    LIDOcaine HCL 10 mg/ml (1%)    melatonin    ondansetron    oxyCODONE    prochlorperazine    sodium chloride 0.9%    sodium chloride 0.9%        Objective:     Vital Signs (Most Recent):  Temp: 97 °F (36.1 °C) (02/15/24 0735)  Pulse: 101 (02/15/24 0735)  Resp: 20 (02/15/24 0735)  BP: (!) 99/50 (02/15/24 0735)  SpO2: 100 % (02/15/24 0735) Vital Signs (24h Range):  Temp:  [97 °F (36.1 °C)-98.8 °F (37.1 °C)] 97 °F (36.1 °C)  Pulse:  [] 101  Resp:  [16-20] 20  SpO2:  [95 %-100 %] 100 %  BP: ()/(50-78) 99/50     Intake/Output - Last 3 Shifts         02/13 0700  02/14 0659 02/14 0700  02/15 0659 02/15 0700  02/16 0659    P.O.  545     Other       Total Intake(mL/kg)  545 (6.6)     Urine (mL/kg/hr) 3750 (1.9) 2225 (1.1)     Drains 0 2     Stool  0     Total Output 3750 2227     Net -3750 -1682            Stool Occurrence  2 x              Physical Exam     Constitutional:       General: She is not in acute distress.  Cardiovascular:      Rate and Rhythm: Normal rate.      Pulses: Normal pulses.   Pulmonary:      Effort: Pulmonary effort is normal. No respiratory distress.   Abdominal:      General: There is no distension. Inicisons c/d/I, drain minimal out, urostomy functional, preivous ileostomy site with scan drainage     Palpations: Abdomen is soft.      Tenderness:Minimally  tender  Neurological:      General: No focal deficit present.      Mental Status: She is alert and oriented to person, place, and time.

## 2024-02-15 NOTE — NURSING
Dr Parra discontinued and removed patient nephrostomy tube will monitor area for any swelling and any signs of infection

## 2024-02-15 NOTE — PLAN OF CARE
[facility]      HOME HEALTH ORDERS  FACE TO FACE ENCOUNTER    Patient Name: Edita Riley  YOB: 1965    PCP: Trina Vu MD   PCP Address: [address] / [place] [address]  PCP Phone Number: [phone]  PCP Fax: [phone]    Encounter Date: 9/22/23    Admit to Home Health    Diagnoses:  Active Hospital Problems    Diagnosis  POA    *Hydronephrosis of left kidney [N13.30]  Yes    Difficult intravenous access [Z78.9]  Yes    History of stroke [Z86.73]  Not Applicable    History of suicidal ideation [Z86.59]  Not Applicable    Arthritis [M19.90]  Yes    Refusal of blood transfusions as patient is [employer] [Z53.1]  Not Applicable    Migraine without aura and without status migrainosus, not intractable [G43.009]  Yes    UTI (urinary tract infection) [N39.0]  Yes    CKD (chronic kidney disease), stage III [N18.30]  Yes    Nephrostomy status [Z93.6]  Not Applicable     Chronic    Hard to intubate [T88.4XXA]  Yes    Presence of urostomy [Z93.6]  Not Applicable    History of DVT (deep vein thrombosis) [Z86.718]  Not Applicable     Chronic    Ileostomy in place [Z93.2]  Not Applicable     Chronic    Moderate malnutrition [E44.0]  Yes     Chronic    Radiation cystitis [N30.40]  Yes     Chronic    Anemia in chronic kidney disease [N18.9, D63.1]  Yes    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.0, T45.1X5A]  Yes    PTSD (post-traumatic stress disorder) [F43.10]  Yes     Chronic    Metastatic squamous cell carcinoma to lymph node [C77.9]  Yes     Chronic    Gastro-esophageal reflux disease with esophagitis [K21.00]  Yes    Carpal tunnel syndrome on right [G56.01]  Yes    Severe episode of recurrent major depressive disorder, with psychotic features [F33.3]  Yes     Chronic    Fibromyalgia [M79.7]  Yes     Chronic    Internal hemorrhoids [K64.8]  Yes    Weakness of both lower extremities [R29.898]  Yes    Cervical cancer [C53.9]  Yes    Osteoarthritis of back [M47.9]   Yes     Chronic    Essential hypertension [I10]  Yes      Resolved Hospital Problems   No resolved problems to display.       Follow Up Appointments:  Future Appointments   Date Time Provider Department Center   2/20/2024  1:30 PM Shruti Jones NP [facility] ID [facility]   2/26/2024  1:00 PM Hammad Reynolds MD [facility] COLON [facility]       Allergies:  Review of patient's allergies indicates:   Allergen Reactions    Bee sting [allergen ext-venom-honey bee]      Rash      Grass pollen-bermuda, standard      rash       Medications: Review discharge medications with patient and family and provide education.    Current Facility-Administered Medications   Medication Dose Route Frequency Provider Last Rate Last Admin    acetaminophen tablet 1,000 mg  1,000 mg Oral Q8H Marlo Gabriel MD   1,000 mg at 02/15/24 1402    heparin (porcine) injection 5,000 Units  5,000 Units Subcutaneous Q8H Nolan Hartman MD   5,000 Units at 02/15/24 1403    ibuprofen tablet 600 mg  600 mg Oral Q6H PRN Marlo Gabriel MD        LIDOcaine HCL 10 mg/ml (1%) injection 1 mL  1 mL Intradermal Once PRN Marlo Gabriel MD        melatonin tablet 6 mg  6 mg Oral Nightly PRN Nolan Hartman MD   6 mg at 02/08/24 2147    ondansetron injection 4 mg  4 mg Intravenous Q6H PRN Nolan Hartman MD   4 mg at 02/10/24 1502    oxyCODONE immediate release tablet 5 mg  5 mg Oral Q6H PRN Marlo Gabriel MD        prochlorperazine injection Soln 5 mg  5 mg Intravenous Q6H PRN Nolan Hartman MD        sodium chloride 0.9% flush 10 mL  10 mL Intravenous PRN Nolan Hartman MD        sodium chloride 0.9% flush 10 mL  10 mL Intravenous Q6H Leslie Balbuena MD   10 mL at 02/15/24 0610    And    sodium chloride 0.9% flush 10 mL  10 mL Intravenous PRN Leslie Balbuena MD   10 mL at 02/08/24 1732     Current Discharge Medication List        CONTINUE these medications which have NOT CHANGED    Details   ferrous sulfate (FEOSOL) 325 mg (65 mg iron) Tab tablet Take  1 tablet (325 mg total) by mouth 2 (two) times daily.  Qty: 60 tablet, Refills: 2    Associated Diagnoses: Anemia of chronic disease      loratadine (CLARITIN) 10 mg tablet Take 10 mg by mouth daily as needed for Allergies.      mirtazapine (REMERON) 30 MG tablet Take 1 tablet (30 mg total) by mouth nightly.  Qty: 30 tablet, Refills: 11      ondansetron (ZOFRAN-ODT) 4 MG TbDL Dissolve 1 tablet (4 mg total) by mouth every 8 (eight) hours as needed (nausea).  Qty: 15 tablet, Refills: 0      sodium bicarbonate 650 MG tablet Take 2 tablets (1,300 mg total) by mouth 2 (two) times daily.  Qty: 60 tablet, Refills: 0               I have seen and examined this patient within the last 30 days. My clinical findings that support the need for the home health skilled services and home bound status are the following:no   Weakness/numbness causing balance and gait disturbance due to Surgery making it taxing to leave home.     Diet:   regular diet    Labs:  Report Lab results to PCP.    Referrals/ Consults  Physical Therapy to evaluate and treat. Evaluate for home safety and equipment needs; Establish/upgrade home exercise program. Perform / instruct on therapeutic exercises, gait training, transfer training, and Range of Motion.  Occupational Therapy to evaluate and treat. Evaluate home environment for safety and equipment needs. Perform/Instruct on transfers, ADL training, ROM, and therapeutic exercises.    Activities:   activity as tolerated, no strenuous exercise for 6 weeks, and no heavy lifting for 6 weeks    Nursing:   Agency to admit patient within 24 hours of hospital discharge unless specified on physician order or at patient request    SN to complete comprehensive assessment including routine vital signs. Instruct on disease process and s/s of complications to report to MD. Review/verify medication list sent home with the patient at time of discharge  and instruct patient/caregiver as needed. Frequency may be adjusted  depending on start of care date.     Skilled nurse to perform up to 3 visits PRN for symptoms related to diagnosis    Notify MD if SBP > 160 or < 90; DBP > 90 or < 50; HR > 120 or < 50; Temp > 101; O2 < 88%; Other:       Ok to schedule additional visits based on staff availability and patient request on consecutive days within the home health episode.    When multiple disciplines ordered:    Start of Care occurs on Sunday - Wednesday schedule remaining discipline evaluations as ordered on separate consecutive days following the start of care.    Thursday SOC -schedule subsequent evaluations Friday and Monday the following week.     Friday - Saturday SOC - schedule subsequent discipline evaluations on consecutive days starting Monday of the following week.    For all post-discharge communication and subsequent orders please contact patient's primary care physician. If unable to reach primary care physician or do not receive response within 30 minutes, please contact Maryann Darnell MD for clinical staff order clarification    Miscellaneous   Colostomy Care:  Instruct patient/caregiver to empty bag when full and PRN., Change and clean site every 48 hours, and Monitor skin integrity.    Home Health Aide:  Physical Therapy Three times weekly, Occupational Therapy Three times weekly, and Home Health Aide Three times weekly    Wound Care Orders  no    I certify that this patient is confined to her home and needs physical therapy and speech therapy.

## 2024-02-15 NOTE — PLAN OF CARE
Recommendations    1. Continue Low-Fiber/Residue Diet. Texture per SLP.   2. Recommend ONS Boost Plus (or alternative) - vanilla BID.   3. Recommend MVI + B-complex vitamin supplemenation.   4. Monitor I/O's, weight and labs.   5. RD following.    Goals: Meet % EEN/EPN by follow-up date,  Nutrition Goal Status: progressing towards goal  Communication of RD Recs: other (comment) (POC)

## 2024-02-15 NOTE — ASSESSMENT & PLAN NOTE
Edita Riley is a 59 y.o. female with a history of cervical cancer s/p XRT complicated by end-stage bladder and distal ureteral strictures. She underwent urinary diversion with a transverse colon conduit in 2020 complicated by a bowel perforation resulting in an ileostomy creation for GI diversion. She is now s/p surgical repair of her left ureterocolic anastomotic stricture and ileostomy reversal on 2/6/24.       - Pain - MMPC  - Diet - will advance to reg diet  - Will stop TPN  - maintain drains, will dc pending discharge   - Nausea control w/ PRN antiemetics   - OOB, ambulate 5x daily    - PT/OT  - Encourage IS  - DVT ppx: sqh   - daily labs  - restarting home meds as appropriate   - Replete lytes PRN  - Please include nursing notes for shift, patient may be unreliable historian to surgical team on morning rounds   - needs home health wound care  - will consult ostomy nurse   - f/u ID recs for abx, may be able to dc PICC    - Dispo: potential dc home today vs tomorrow

## 2024-02-15 NOTE — PROGRESS NOTES
"[facility]  Adult Nutrition  Progress Note    SUMMARY       Recommendations    1. Continue Low-Fiber/Residue Diet. Texture per SLP.   2. Recommend ONS Boost Plus (or alternative) - vanilla BID.   3. Recommend MVI + B-complex vitamin supplemenation.   4. Monitor I/O's, weight and labs.   5. RD following.    Goals: Meet % EEN/EPN by follow-up date,  Nutrition Goal Status: progressing towards goal  Communication of RD Recs: other (comment) (POC)    Assessment and Plan       Nutrition Problem  Inadequate oral intake    Related to (etiology):   Decreased ability to consume sufficient energy    Signs and Symptoms (as evidenced by):   NPO and new TPN     Interventions/Recommendations (treatment strategy):  Collaboration of nutrition care with other providers  NPO  TPN  ADAT    Nutrition Diagnosis Status:   Resolved           Reason for Assessment    Reason For Assessment: RD follow-up  Diagnosis: other (see comments) (Hydronephrosis of left kidney)  Relevant Medical History: LLE DVT, cervical CA, HTN, stroke, seizures  Interdisciplinary Rounds: did not attend  General Information Comments: RD follow-up. Tolerating diet. PO intake for breakfast 50%. RD encourages ONS patient willing to try. No N/V/C/D, denies swallowing/chewing.  Nutrition Discharge Planning: Pending Clinical course    Nutrition Risk Screen    Nutrition Risk Screen: no indicators present    Nutrition/Diet History    Patient Reported Diet/Restrictions/Preferences: general  Spiritual, Cultural Beliefs, Latter-day Practices, Values that Affect Care: no  Food Allergies: NKFA  Factors Affecting Nutritional Intake: None identified at this time    Anthropometrics    Temp: 98.6 °F (37 °C)  Height Method: Stated  Height: 5' 9" (175.3 cm)  Height (inches): 69 in  Weight Method: Infant Scale  Weight: 82 kg (180 lb 12.4 oz)  Weight (lb): 180.78 lb  Ideal Body Weight (IBW), Female: 145 lb  % Ideal Body Weight, Female (lb): 124.68 %  BMI (Calculated): 26.7   "     Lab/Procedures/Meds    Pertinent Labs Reviewed: reviewed  Pertinent Labs Comments: H/H: 8.3/27.8, MCHC 29.9, CO2 31, GFR 57.2, Ca 8.6, CRP 83.2  Pertinent Medications Reviewed: reviewed  Pertinent Medications Comments: ertapenem, cetirizine, heparin, mirtazapine, pregabalin, sodium bicarb    Estimated/Assessed Needs    Weight Used For Calorie Calculations: 81.6 kg (180 lb)  Energy Calorie Requirements (kcal): 8237-0372 kcal/d (MSJ x 1.25-1.3 AF)  Energy Need Method: Lacon-St Jeor  Protein Requirements: 65-82 g/d (0.8-1.0 g/kg)  Weight Used For Protein Calculations: 81.6 kg (180 lb)        RDA Method (mL): 1807         Nutrition Prescription Ordered    Current Diet Order: Low Fiber/Residue    Evaluation of Received Nutrient/Fluid Intake    I/O: -1.68 L  Comments: LBM: 2/14  % Intake of Estimated Energy Needs: 75 - 100 %  % Meal Intake: 25 - 50 %    Nutrition Risk    Level of Risk/Frequency of Follow-up: moderate (1-2x/ week)     Monitor and Evaluation    Food and Nutrient Intake: energy intake, food and beverage intake, enteral nutrition intake, parenteral nutrition intake  Food and Nutrient Adminstration: diet order, enteral and parenteral nutrition administration  Physical Activity and Function: nutrition-related ADLs and IADLs, factors affecting access to physical activity  Anthropometric Measurements: weight, weight change, body mass index  Biochemical Data, Medical Tests and Procedures: electrolyte and renal panel, gastrointestinal profile, glucose/endocrine profile, inflammatory profile, lipid profile  Nutrition-Focused Physical Findings: overall appearance, extremities, muscles and bones, head and eyes, skin     Nutrition Follow-Up    RD Follow-up?: Yes    Lawrence Nunez Registration Eligible, Provisional LDN

## 2024-02-15 NOTE — PLAN OF CARE
[facility] Plan of Care Note     Dx Hydronephrosis of Left Kidney     Shift Events Dressing change to abdomen     Goals of Care: pain management, monitor N/V, monitor drains     Neuro: AAO x 4     Vital Signs: VSS     Respiratory: RA     Diet: Low Fiber Low Residue     Is patient tolerating current diet? yes     GTTS: TPN @ 25     Urine Output/Bowel Movement: adequate urine output with urostomy and last bm 2-14-24     Drains/Tubes/Tube Feeds (include total output/shift): urostomy, nephrostomy, L ORESTES drain     Lines: R picc     Accuchecks: none     Skin: midline incision with staples HARPREET , L urostomy, L nephrostomy, L ORESTES drain, R side skin dry, R buttocks     Fall Risk Score: 10     Activity level? Standby assist      Any scheduled procedures? none     Any safety concerns? Fall precautions     Other: none    Problem: Adult Inpatient Plan of Care  Goal: Plan of Care Review  Outcome: Ongoing, Progressing  Goal: Patient-Specific Goal (Individualized)  Outcome: Ongoing, Progressing  Goal: Absence of Hospital-Acquired Illness or Injury  Outcome: Ongoing, Progressing  Goal: Optimal Comfort and Wellbeing  Outcome: Ongoing, Progressing  Goal: Readiness for Transition of Care  Outcome: Ongoing, Progressing     Problem: Fluid and Electrolyte Imbalance (Acute Kidney Injury/Impairment)  Goal: Fluid and Electrolyte Balance  Outcome: Ongoing, Progressing     Problem: Oral Intake Inadequate (Acute Kidney Injury/Impairment)  Goal: Optimal Nutrition Intake  Outcome: Ongoing, Progressing     Problem: Renal Function Impairment (Acute Kidney Injury/Impairment)  Goal: Effective Renal Function  Outcome: Ongoing, Progressing     Problem: Impaired Wound Healing  Goal: Optimal Wound Healing  Outcome: Ongoing, Progressing     Problem: Fall Injury Risk  Goal: Absence of Fall and Fall-Related Injury  Outcome: Ongoing, Progressing     Problem: Infection  Goal: Absence of Infection Signs and Symptoms  Outcome: Ongoing, Progressing

## 2024-02-15 NOTE — NURSING
[facility] Plan of Care Note  Dx urteral obstruction with bilateral hydronephrosis     Shift Events tube removed per nephrostomy    Goals of Care:  home    Neuro: aa o 3    Vital Signs: stable    Respiratory: ra    Diet: regular    Is patient tolerating current diet? yes    GTTS: none    Urine Output/Bowel Movement: nino    Drains/Tubes/Tube Feeds (include total output/shift): nino    Lines: eleanor picc      Accuchecks:stable    Skin: abd and old drain site to back    Fall Risk Score: see chart    Activity level? stable    Any scheduled procedures? none    Any safety concerns? Fall and output    Other: \

## 2024-02-15 NOTE — PT/OT/SLP PROGRESS
Occupational Therapy   Treatment    Name: Edita Riley  MRN: [id number]  Admitting Diagnosis:  Hydronephrosis of left kidney  9 Days Post-Op    Recommendations:     Discharge Recommendations: Low Intensity Therapy  Discharge Equipment Recommendations:  none  Barriers to discharge:  None    Assessment:     Edita Riley is a 59 y.o. female with a medical diagnosis of Hydronephrosis of left kidney.  She presents with the fallowing performance deficits affecting function are weakness, impaired endurance, impaired self care skills, impaired functional mobility, gait instability, decreased lower extremity function, decreased safety awareness, impaired balance. Patient is participating well with therapy session, patient is demonstrating progress with functional mobility, endurance, and self-care task. Patient would benefit from Low intensity therapy intervention post acute environment to continue to train patient on proper transfers techniques and ADLs in order to gain functional independence and return to PLOF.    Rehab Prognosis:  Good; patient would benefit from acute skilled OT services to address these deficits and reach maximum level of function.       Plan:     Patient to be seen 4 x/week to address the above listed problems via self-care/home management, therapeutic activities, therapeutic exercises  Plan of Care Expires: 02/08/24  Plan of Care Reviewed with: patient    Subjective     Chief Complaint: none  Patient/Family Comments/goals: to get better and go home   Pain/Comfort:  Pain Rating 1: 0/10    Objective:     Communicated with: Nurse prior to session.  Patient found up in chair with peripheral IV, SCD upon OT entry to room.    General Precautions: Standard, fall    Orthopedic Precautions:N/A  Braces: N/A  Respiratory Status: Room air     Occupational Performance:     Functional Mobility/Transfers:  Patient trained on resistance exercises for B UE and LE to impact with functional  endurance and strength.   Patient trained on sit to stand transfers and bed<>bedside chair transfers. Patient was able to perform task with SBA to Sup A with RW.    Activities of Daily Living:  Lower Body Dressing: independence to don/doff socks       [facility] 6 Click ADL: 18    Treatment & Education:  HARPREET educated patient on the importance to continue to perform OOB activities with the assistance from staff in order to reduce the risk for debility to progress.  Addressed patient's questions and concerns within HUGHES scope of practice.    Patient left up in chair with all lines intact and call button in reach    GOALS:   Multidisciplinary Problems       Occupational Therapy Goals          Problem: Occupational Therapy    Goal Priority Disciplines Outcome Interventions   Occupational Therapy Goal     OT, PT/OT Ongoing, Progressing    Description: Goals to be met by: 2/14/24    Patient will increase functional independence with ADLs by performing:    LE Dressing with Stand-by Assistance.  Grooming while standing at sink with Set-up Assistance.  Toileting from toilet with Stand-by Assistance for hygiene and clothing management.   Supine to sit with Supervision.  Toilet transfer to toilet with Supervision.                         Time Tracking:     OT Date of Treatment: 02/15/24  OT Start Time: 1038  OT Stop Time: 1119  OT Total Time (min): 41 min    Billable Minutes:Self Care/Home Management 11  Therapeutic Activity 15  Therapeutic Exercise 15    OT/HARPREET: HARPREET     Number of HARPREET visits since last OT visit: 1    2/15/2024

## 2024-02-16 LAB
ANION GAP SERPL CALC-SCNC: 6 MMOL/L (ref 8–16)
BASOPHILS # BLD AUTO: 0.03 K/UL (ref 0–0.2)
BASOPHILS NFR BLD: 0.4 % (ref 0–1.9)
BUN SERPL-MCNC: 9 MG/DL (ref 8–23)
CALCIUM SERPL-MCNC: 9.1 MG/DL (ref 8.7–10.5)
CHLORIDE SERPL-SCNC: 101 MMOL/L (ref 95–110)
CO2 SERPL-SCNC: 32 MMOL/L (ref 23–29)
CREAT SERPL-MCNC: 1.1 MG/DL (ref 0.5–1.4)
CRP SERPL-MCNC: 89.6 MG/L (ref 0–8.2)
DIFFERENTIAL METHOD BLD: ABNORMAL
EOSINOPHIL # BLD AUTO: 0.4 K/UL (ref 0–0.5)
EOSINOPHIL NFR BLD: 4.8 % (ref 0–8)
ERYTHROCYTE [DISTWIDTH] IN BLOOD BY AUTOMATED COUNT: 15.9 % (ref 11.5–14.5)
EST. GFR  (NO RACE VARIABLE): 57.2 ML/MIN/1.73 M^2
GLUCOSE SERPL-MCNC: 82 MG/DL (ref 70–110)
HCT VFR BLD AUTO: 26.9 % (ref 37–48.5)
HGB BLD-MCNC: 7.9 G/DL (ref 12–16)
IMM GRANULOCYTES # BLD AUTO: 0.03 K/UL (ref 0–0.04)
IMM GRANULOCYTES NFR BLD AUTO: 0.4 % (ref 0–0.5)
LYMPHOCYTES # BLD AUTO: 1.6 K/UL (ref 1–4.8)
LYMPHOCYTES NFR BLD: 20.4 % (ref 18–48)
MAGNESIUM SERPL-MCNC: 2 MG/DL (ref 1.6–2.6)
MCH RBC QN AUTO: 27.9 PG (ref 27–31)
MCHC RBC AUTO-ENTMCNC: 29.4 G/DL (ref 32–36)
MCV RBC AUTO: 95 FL (ref 82–98)
MONOCYTES # BLD AUTO: 1.2 K/UL (ref 0.3–1)
MONOCYTES NFR BLD: 14.6 % (ref 4–15)
NEUTROPHILS # BLD AUTO: 4.7 K/UL (ref 1.8–7.7)
NEUTROPHILS NFR BLD: 59.4 % (ref 38–73)
NRBC BLD-RTO: 0 /100 WBC
PHOSPHATE SERPL-MCNC: 4.2 MG/DL (ref 2.7–4.5)
PLATELET # BLD AUTO: 272 K/UL (ref 150–450)
PMV BLD AUTO: 9.7 FL (ref 9.2–12.9)
POTASSIUM SERPL-SCNC: 4.2 MMOL/L (ref 3.5–5.1)
RBC # BLD AUTO: 2.83 M/UL (ref 4–5.4)
SODIUM SERPL-SCNC: 139 MMOL/L (ref 136–145)
WBC # BLD AUTO: 7.93 K/UL (ref 3.9–12.7)

## 2024-02-16 PROCEDURE — 25500020 PHARM REV CODE 255: Performed by: SURGERY

## 2024-02-16 PROCEDURE — 86140 C-REACTIVE PROTEIN: CPT | Performed by: STUDENT IN AN ORGANIZED HEALTH CARE EDUCATION/TRAINING PROGRAM

## 2024-02-16 PROCEDURE — 25500020 PHARM REV CODE 255: Performed by: UROLOGY

## 2024-02-16 PROCEDURE — 83735 ASSAY OF MAGNESIUM: CPT | Performed by: STUDENT IN AN ORGANIZED HEALTH CARE EDUCATION/TRAINING PROGRAM

## 2024-02-16 PROCEDURE — 80048 BASIC METABOLIC PNL TOTAL CA: CPT | Performed by: STUDENT IN AN ORGANIZED HEALTH CARE EDUCATION/TRAINING PROGRAM

## 2024-02-16 PROCEDURE — 85025 COMPLETE CBC W/AUTO DIFF WBC: CPT | Performed by: STUDENT IN AN ORGANIZED HEALTH CARE EDUCATION/TRAINING PROGRAM

## 2024-02-16 PROCEDURE — A4216 STERILE WATER/SALINE, 10 ML: HCPCS | Performed by: UROLOGY

## 2024-02-16 PROCEDURE — 84100 ASSAY OF PHOSPHORUS: CPT | Performed by: STUDENT IN AN ORGANIZED HEALTH CARE EDUCATION/TRAINING PROGRAM

## 2024-02-16 PROCEDURE — 20600001 HC STEP DOWN PRIVATE ROOM

## 2024-02-16 PROCEDURE — 97535 SELF CARE MNGMENT TRAINING: CPT

## 2024-02-16 PROCEDURE — 97530 THERAPEUTIC ACTIVITIES: CPT

## 2024-02-16 PROCEDURE — 25000003 PHARM REV CODE 250: Performed by: UROLOGY

## 2024-02-16 PROCEDURE — 25000003 PHARM REV CODE 250

## 2024-02-16 PROCEDURE — 63600175 PHARM REV CODE 636 W HCPCS: Performed by: STUDENT IN AN ORGANIZED HEALTH CARE EDUCATION/TRAINING PROGRAM

## 2024-02-16 RX ADMIN — HEPARIN SODIUM 5000 UNITS: 5000 INJECTION INTRAVENOUS; SUBCUTANEOUS at 04:02

## 2024-02-16 RX ADMIN — ACETAMINOPHEN 1000 MG: 500 TABLET ORAL at 04:02

## 2024-02-16 RX ADMIN — ACETAMINOPHEN 1000 MG: 500 TABLET ORAL at 09:02

## 2024-02-16 RX ADMIN — IOHEXOL 15 ML: 350 INJECTION, SOLUTION INTRAVENOUS at 12:02

## 2024-02-16 RX ADMIN — ACETAMINOPHEN 1000 MG: 500 TABLET ORAL at 05:02

## 2024-02-16 RX ADMIN — OXYCODONE 5 MG: 5 TABLET ORAL at 09:02

## 2024-02-16 RX ADMIN — Medication 10 ML: at 12:02

## 2024-02-16 RX ADMIN — IOHEXOL 75 ML: 350 INJECTION, SOLUTION INTRAVENOUS at 03:02

## 2024-02-16 RX ADMIN — Medication 10 ML: at 05:02

## 2024-02-16 RX ADMIN — HEPARIN SODIUM 5000 UNITS: 5000 INJECTION INTRAVENOUS; SUBCUTANEOUS at 09:02

## 2024-02-16 RX ADMIN — ONDANSETRON 4 MG: 2 INJECTION INTRAMUSCULAR; INTRAVENOUS at 09:02

## 2024-02-16 RX ADMIN — HEPARIN SODIUM 5000 UNITS: 5000 INJECTION INTRAVENOUS; SUBCUTANEOUS at 05:02

## 2024-02-16 NOTE — PLAN OF CARE
[facility] Plan of Care Note     Dx Hydronephrosis of Left Kidney     Shift Events Dressing change to R picc  line clean dry and intact, blood return noted from both line, but sluggish.   Jack drain remove at 6 am by  MD Gabriel, C,DYLAN,I.     Goals of Care: pain management, monitor N/V, monitor drains     Neuro: AAO x 4     Vital Signs: VSS     Respiratory: RA     Diet: Low Fiber Low Residue     Is patient tolerating current diet? yes     GTTS: none     Urine Output/Bowel Movement: adequate urine output with urostomy and last bm 2-15-24     Drains/Tubes/Tube Feeds (include total output/shift): L urostomy     Lines: R picc     Accuchecks: none     Skin: midline incision with staples HARPREET , L urostomy, R side skin dry, R buttocks     Fall Risk Score: 12     Activity level? Standby assist      Any scheduled procedures? none     Any safety concerns? Fall precautions     Other: none    Problem: Adult Inpatient Plan of Care  Goal: Plan of Care Review  Outcome: Ongoing, Progressing  Goal: Patient-Specific Goal (Individualized)  Outcome: Ongoing, Progressing  Goal: Absence of Hospital-Acquired Illness or Injury  Outcome: Ongoing, Progressing  Goal: Optimal Comfort and Wellbeing  Outcome: Ongoing, Progressing  Goal: Readiness for Transition of Care  Outcome: Ongoing, Progressing     Problem: Fluid and Electrolyte Imbalance (Acute Kidney Injury/Impairment)  Goal: Fluid and Electrolyte Balance  Outcome: Ongoing, Progressing     Problem: Oral Intake Inadequate (Acute Kidney Injury/Impairment)  Goal: Optimal Nutrition Intake  Outcome: Ongoing, Progressing     Problem: Renal Function Impairment (Acute Kidney Injury/Impairment)  Goal: Effective Renal Function  Outcome: Ongoing, Progressing     Problem: Impaired Wound Healing  Goal: Optimal Wound Healing  Outcome: Ongoing, Progressing     Problem: Fall Injury Risk  Goal: Absence of Fall and Fall-Related Injury  Outcome: Ongoing, Progressing     Problem: Infection  Goal: Absence of Infection  Signs and Symptoms  Outcome: Ongoing, Progressing

## 2024-02-16 NOTE — SUBJECTIVE & OBJECTIVE
Subjective:     Interval History: NAEO. Feeling well. Having bowel function. Ambulating. Tolerating diet.     Post-Op Info:  Procedure(s) (LRB):  REVISION, ANASTOMOSIS, URETEROILEAL (N/A)  OPEN LAPAROTOMY, EXPLORATORY (N/A)  CLOSURE, ILEOSTOMY (N/A)  ANASTOMOSIS, INTESTINE - Ileocolic anastomosis (N/A)  EXCISION, SMALL INTESTINE  LYSIS, ADHESIONS  LYSIS, ADHESIONS   10 Days Post-Op      Medications:  Continuous Infusions:  Scheduled Meds:   acetaminophen  1,000 mg Oral Q8H    heparin (porcine)  5,000 Units Subcutaneous Q8H    sodium chloride 0.9%  10 mL Intravenous Q6H     PRN Meds:   ibuprofen    iohexol    LIDOcaine HCL 10 mg/ml (1%)    melatonin    ondansetron    oxyCODONE    prochlorperazine    sodium chloride 0.9%    sodium chloride 0.9%        Objective:     Vital Signs (Most Recent):  Temp: 98.8 °F (37.1 °C) (02/16/24 0737)  Pulse: 86 (02/16/24 0737)  Resp: 20 (02/16/24 0737)  BP: (!) 114/53 (02/16/24 0737)  SpO2: 97 % (02/16/24 0737) Vital Signs (24h Range):  Temp:  [98.1 °F (36.7 °C)-98.8 °F (37.1 °C)] 98.8 °F (37.1 °C)  Pulse:  [] 86  Resp:  [15-20] 20  SpO2:  [94 %-98 %] 97 %  BP: (100-115)/(51-59) 114/53     Intake/Output - Last 3 Shifts         02/14 0700  02/15 0659 02/15 0700  02/16 0659 02/16 0700 02/17 0659    P.O. 545 240     Total Intake(mL/kg) 545 (6.6) 240 (2.9)     Urine (mL/kg/hr) 2225 (1.1) 3125 (1.6)     Drains 2      Stool 0 0     Total Output 2227 3125     Net -1682 -2884            Stool Occurrence 2 x 0 x     Emesis Occurrence  1 x              Physical Exam     Constitutional:       General: She is not in acute distress.  Cardiovascular:      Rate and Rhythm: Normal rate.      Pulses: Normal pulses.   Pulmonary:      Effort: Pulmonary effort is normal. No respiratory distress.   Abdominal:      General: There is no distension. Inicisons c/d/I, drain minimal out, urostomy functional, preivous ileostomy site with scan drainage     Palpations: Abdomen is soft.       Tenderness:Minimally tender  Neurological:      General: No focal deficit present.      Mental Status: She is alert and oriented to person, place, and time.     Significant Labs:  BMP (Last 3 Results):   Recent Labs   Lab 02/14/24  0553 02/15/24  0600 02/16/24  0230    92 82    139 139   K 4.7 4.7 4.2    102 101   CO2 28 31* 32*   BUN 12 10 9   CREATININE 0.9 1.1 1.1   CALCIUM 8.5* 8.6* 9.1   MG 2.1 2.1 2.0     CBC (Last 3 Results):   Recent Labs   Lab 02/14/24  0556 02/15/24  0600 02/16/24  0230   WBC 8.80 9.14 7.93   RBC 3.05* 2.88* 2.83*   HGB 8.8* 8.3* 7.9*   HCT 29.4* 27.8* 26.9*    263 272   MCV 96 97 95   MCH 28.9 28.8 27.9   MCHC 29.9* 29.9* 29.4*     CRP (Last 3 Results):   Recent Labs   Lab 02/14/24  0553 02/15/24  0600 02/16/24  0230   CRP 64.5* 83.2* 89.6*       Significant Diagnostics:  None

## 2024-02-16 NOTE — PT/OT/SLP PROGRESS
"Occupational Therapy   Treatment    Name: Edita Riley  MRN: [id number]  Admitting Diagnosis:  Hydronephrosis of left kidney  10 Days Post-Op    Recommendations:     Discharge Recommendations: Low Intensity Therapy  Discharge Equipment Recommendations:  none  Barriers to discharge:  None    Assessment:     Edita Riley is a 59 y.o. female with a medical diagnosis of Hydronephrosis of left kidney.  Performance deficits affecting function are weakness, impaired endurance, impaired self care skills, gait instability, impaired functional mobility, decreased lower extremity function, decreased safety awareness, impaired balance. Pt agreeable to therapy and tolerated well. Pt continues to make steady and functional gains in functional mobility and self-care skills. Pt remains limited in ADLs, functional mobility, and functional transfers.      Rehab Prognosis:  Good; patient would benefit from acute skilled OT services to address these deficits and reach maximum level of function.       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  Patient found HOB elevated with nino catheter upon OT entry to room.    General Precautions: Standard, fall    Orthopedic Precautions:N/A  Braces: N/A  Respiratory Status: Room air     Occupational Performance:     Bed Mobility:  HOB elevated  Patient completed Supine to Sit with modified independence  Patient completed Sit to Supine with " modified independence     Functional Mobility/Transfers:  Patient completed Sit <> Stand Transfer with modified independence  with  rolling walker   Functional Mobility: Pt engaged in functional mobility throughout hospital room and hallway ~150 ft with RW and  SPV to maximize functional endurance and standing balance required for home/community mobility and occupational engagement. Pt required verbal and tactile cueing for correct hand placement and safety awareness when using RW.    Activities of Daily Living:  Grooming: modified independence Pt performed oral care in stance at sink in RW  Upper Body Dressing: setup pt donned gown over back sitting EOB      [facility] 6 Click ADL: 19    Treatment & Education:  -Education on energy conservation and task modification to maximize safety and (I) during ADLs and mobility  -Education on importance of OOB activity to improve overall activity tolerance and promote recovery  -Pt educated to call for assistance and to transfer with hospital staff only  -Provided education regarding role of OT, POC, & discharge recommendations with pt verbalizing understanding.  Pt had no further questions & when asked whether there were any concerns pt reported none.      Patient left HOB elevated with all lines intact, call button in reach, and RN notified    GOALS:   Multidisciplinary Problems       Occupational Therapy Goals          Problem: Occupational Therapy    Goal Priority Disciplines Outcome Interventions   Occupational Therapy Goal     OT, PT/OT Ongoing, Progressing    Description: Goals to be met by: 2/14/24    Patient will increase functional independence with ADLs by performing:    LE Dressing with Stand-by Assistance. Met 2/15/24  Grooming while standing at sink with Mod I .  Toileting from toilet with Stand-by Assistance for hygiene and clothing management.   Supine to sit with Supervision.Met 2/16/24  Toilet transfer to toilet with Supervision.                           Time  Tracking:     OT Date of Treatment: 02/16/24  OT Start Time: 0908  OT Stop Time: 0931  OT Total Time (min): 23 min    Billable Minutes:Self Care/Home Management 10 min  Therapeutic Activity 13 min     OT/HARPREET: OT     Number of HARPREET visits since last OT visit: 1    2/16/2024

## 2024-02-16 NOTE — ASSESSMENT & PLAN NOTE
Edita Riley is a 59 y.o. female with a history of cervical cancer s/p XRT complicated by end-stage bladder and distal ureteral strictures. She underwent urinary diversion with a transverse colon conduit in 2020 complicated by a bowel perforation resulting in an ileostomy creation for GI diversion. She is now s/p surgical repair of her left ureterocolic anastomotic stricture and ileostomy reversal on 2/6/24.     - CRPs per CRS, uptrending   - Pain - MMPC  - Diet - continue regular diet  - ORESTES drain removed at bedside   - Nausea control w/ PRN antiemetics   - OOB, ambulate 5x daily    - PT/OT  - Encourage IS  - DVT ppx: sqh   - daily labs  - restarting home meds as appropriate   - Replete lytes PRN  - Please include nursing notes for shift, patient may be unreliable historian to surgical team on morning rounds   - needs home health wound care  - will consult ostomy nurse   - ID recommending dc of antibiotics     - Dispo: ok for discharge today pending CRS recs    - will d/c PICC before discharge

## 2024-02-16 NOTE — PLAN OF CARE
Problem: Occupational Therapy  Goal: Occupational Therapy Goal  Description: Goals to be met by: 2/14/24    Patient will increase functional independence with ADLs by performing:    LE Dressing with Stand-by Assistance. Met 2/15/24  Grooming while standing at sink with Mod I .  Toileting from toilet with Stand-by Assistance for hygiene and clothing management.   Supine to sit with Supervision.Met 2/16/24  Toilet transfer to toilet with Supervision.      Outcome: Ongoing, Progressing

## 2024-02-16 NOTE — PLAN OF CARE
02/16/24 1438   Discharge Reassessment   Assessment Type Discharge Planning Reassessment   Did the patient's condition or plan change since previous assessment? No   Discharge Plan discussed with: Patient   Communicated OK with patient/caregiver Yes   Discharge Plan A Home with family   Discharge Plan B Home   DME Needed Upon Discharge  none   Transition of Care Barriers None   Why the patient remains in the hospital Requires continued medical care   Post-Acute Status   Hospital Resources/Appts/Education Provided Provided patient/caregiver with written discharge plan information   Patient choice form signed by patient/caregiver List with quality metrics by geographic area provided   Discharge Delays None known at this time       Pt not ready for discharge due to: Not medically ready  SW will remain available for families in [facility].  Currently pt has d/c plans in progress at this time.    Pt denied by all [employer] companies due to being underinsured  Discharge Plan A and Plan B have been determined by review of patient's clinical status, future medical and therapeutic needs, and coverage/benefits for post-acute care in coordination with multidisciplinary team members.     Yudelka Wilson LCSW  Case Management/[facility]  [phone]\

## 2024-02-16 NOTE — PT/OT/SLP PROGRESS
Physical Therapy      Patient Name:  Edita Hardin [facility]   MRN:  [id number]    Patient not seen today secondary to  pt working with OT upon attempt. Will follow-up next scheduled treatment per PT POC.

## 2024-02-16 NOTE — PROGRESS NOTES
[facility]  Urology  Progress Note    Patient Name: Edita Riley  MRN: [id number]  Admission Date: 2/6/2024  Hospital Length of Stay: 10 days  Code Status: Full Code   Attending Provider: Leslie Balbuena MD   Primary Care Physician: Trina Vu MD    Subjective:     HPI:  Edita Riley is a 58 y.o. female who presents with a history of radiation therapy for cervical cancer which was complicated by bilateral distal ureteral obstruction with bilateral hydronephrosis.  She is status post colon conduit which was performed in 2020.  This was complicated by large bowel perforation away from the anastamosis.  She has an ileostomy and a left percutaneous nephrostomy that was last exchanged on 10/26/2023.  She was admitted to the hospital on 12/7/2023 after her percutaneous nephrostomy tube was clogged and she had been feeling ill.  She was scheduled for revision but missed her appointment with Dr. Reynolds and had to be rescheduled.     Interval History: NAEO. Passing gas and having BM. Tolerating diet. CRP with slight increase.     Objective:     Temp:  [97 °F (36.1 °C)-98.6 °F (37 °C)] 98.4 °F (36.9 °C)  Pulse:  [] 88  Resp:  [15-20] 18  SpO2:  [94 %-100 %] 94 %  BP: ()/(50-59) 112/58     Body mass index is 26.7 kg/m².           Drains       Drain  Duration                  Suprapubic Catheter 100% silicone 16 Fr. -- days         Nephrostomy 02/02/24 1255 Left 12 Fr. 13 days         Closed/Suction Drain 02/06/24 Tube - 1 Midline Abdomen Bulb 15 Fr. 10 days         Urostomy 02/06/24 1815 ureterostomy, left 9 days                     Physical Exam  Constitutional:       General: She is not in acute distress.     Appearance: Normal appearance.   HENT:      Head: Normocephalic and atraumatic.      Nose: Nose normal.   Eyes:      Conjunctiva/sclera: Conjunctivae normal.   Cardiovascular:      Rate and Rhythm: Normal rate.   Pulmonary:      Effort: Pulmonary effort is normal. No  "respiratory distress.   Abdominal:      Palpations: Abdomen is soft.      Comments: Incision CDI  Abdomen appropriately ttp. Soft, non-distended.   Urostomy in place, stent appears appropriately placed, urostomy with CYU output, no surrounding erythema/signs of dehiscence/signs of infection  Prior ileostomy site with packing in place, no significant discharge or purulence  L NT in place with cyu  ORESTES in place w/ minimal ss output    Musculoskeletal:         General: No deformity.   Skin:     General: Skin is warm and dry.   Neurological:      General: No focal deficit present.      Mental Status: She is alert.   Psychiatric:         Mood and Affect: Mood normal.         Behavior: Behavior normal.           Significant Labs:    BMP:  Recent Labs   Lab 02/14/24  0553 02/15/24  0600 02/16/24  0230    139 139   K 4.7 4.7 4.2    102 101   CO2 28 31* 32*   BUN 12 10 9   CREATININE 0.9 1.1 1.1   CALCIUM 8.5* 8.6* 9.1       CBC:   Recent Labs   Lab 02/14/24  0556 02/15/24  0600 02/16/24  0230   WBC 8.80 9.14 7.93   HGB 8.8* 8.3* 7.9*   HCT 29.4* 27.8* 26.9*    263 272       Urine Studies: No results for input(s): "COLORU", "APPEARANCEUA", "PHUR", "SPECGRAV", "PROTEINUA", "GLUCUA", "KETONESU", "BILIRUBINUA", "OCCULTUA", "NITRITE", "UROBILINOGEN", "LEUKOCYTESUR", "RBCUA", "WBCUA", "BACTERIA", "SQUAMEPITHEL", "HYALINECASTS" in the last 168 hours.    Invalid input(s): "WRIGHTSUR"  All pertinent labs results from the past 24 hours have been reviewed.    Significant Imaging:  All pertinent imaging results/findings from the past 24 hours have been reviewed.    Assessment/Plan:     Ileostomy in place  Edita Riley is a 59 y.o. female with a history of cervical cancer s/p XRT complicated by end-stage bladder and distal ureteral strictures. She underwent urinary diversion with a transverse colon conduit in 2020 complicated by a bowel perforation resulting in an ileostomy creation for GI diversion. She " is now s/p surgical repair of her left ureterocolic anastomotic stricture and ileostomy reversal on 2/6/24.     - CRPs per CRS, uptrending   - Pain - MMPC  - Diet - continue regular diet  - ORESTES drain removed at bedside   - Nausea control w/ PRN antiemetics   - OOB, ambulate 5x daily    - PT/OT  - Encourage IS  - DVT ppx: sqh   - daily labs  - restarting home meds as appropriate   - Replete lytes PRN  - Please include nursing notes for shift, patient may be unreliable historian to surgical team on morning rounds   - needs home health wound care  - will consult ostomy nurse   - ID recommending dc of antibiotics     - Dispo: ok for discharge today pending CRS recs    - will d/c PICC before discharge           VTE Risk Mitigation (From admission, onward)           Ordered     heparin (porcine) injection 5,000 Units  Every 8 hours         02/06/24 1725     Place ANANT hose  Until discontinued         02/06/24 0526     Place sequential compression device  Until discontinued         02/06/24 0526                    Maryann Darnell MD  Urology  [facility]

## 2024-02-16 NOTE — ASSESSMENT & PLAN NOTE
[facility] 59 [facility] with history of ureteral stenosis after radiation for cervical cancer requiring urostomy (transverse colon conduit) complicated by anastomotic leak requiring right colectomy and end ileostomy now s/p ex lap, extensive maliha, revision of urostomy, small bowel resection, ileostomy reversal w/ ileosigmoid anastomosis on 2/6    Clinically improved with return of bowel function. No N/V.      Plan:  Tolerating solids  Having bowel function  Ambulate, IS  CRP trending up last three days. CT abdomen pelvis with IV and PO contrast ordered to evaluate for possible infectious source. If Ct without significant pathology patient would be ok for DC from CRS standpoint.   Rest per primary

## 2024-02-16 NOTE — SUBJECTIVE & OBJECTIVE
Interval History: NAEO. Passing gas and having BM. Tolerating diet. CRP with slight increase.     Objective:     Temp:  [97 °F (36.1 °C)-98.6 °F (37 °C)] 98.4 °F (36.9 °C)  Pulse:  [] 88  Resp:  [15-20] 18  SpO2:  [94 %-100 %] 94 %  BP: ()/(50-59) 112/58     Body mass index is 26.7 kg/m².           Drains       Drain  Duration                  Suprapubic Catheter 100% silicone 16 Fr. -- days         Nephrostomy 02/02/24 1255 Left 12 Fr. 13 days         Closed/Suction Drain 02/06/24 Tube - 1 Midline Abdomen Bulb 15 Fr. 10 days         Urostomy 02/06/24 1815 ureterostomy, left 9 days                     Physical Exam  Constitutional:       General: She is not in acute distress.     Appearance: Normal appearance.   HENT:      Head: Normocephalic and atraumatic.      Nose: Nose normal.   Eyes:      Conjunctiva/sclera: Conjunctivae normal.   Cardiovascular:      Rate and Rhythm: Normal rate.   Pulmonary:      Effort: Pulmonary effort is normal. No respiratory distress.   Abdominal:      Palpations: Abdomen is soft.      Comments: Incision CDI  Abdomen appropriately ttp. Soft, non-distended.   Urostomy in place, stent appears appropriately placed, urostomy with CYU output, no surrounding erythema/signs of dehiscence/signs of infection  Prior ileostomy site with packing in place, no significant discharge or purulence  L NT in place with cyu  ORESTES in place w/ minimal ss output    Musculoskeletal:         General: No deformity.   Skin:     General: Skin is warm and dry.   Neurological:      General: No focal deficit present.      Mental Status: She is alert.   Psychiatric:         Mood and Affect: Mood normal.         Behavior: Behavior normal.           Significant Labs:    BMP:  Recent Labs   Lab 02/14/24  0553 02/15/24  0600 02/16/24  0230    139 139   K 4.7 4.7 4.2    102 101   CO2 28 31* 32*   BUN 12 10 9   CREATININE 0.9 1.1 1.1   CALCIUM 8.5* 8.6* 9.1       CBC:   Recent Labs   Lab 02/14/24  0556  "02/15/24  0600 02/16/24  0230   WBC 8.80 9.14 7.93   HGB 8.8* 8.3* 7.9*   HCT 29.4* 27.8* 26.9*    263 272       Urine Studies: No results for input(s): "COLORU", "APPEARANCEUA", "PHUR", "SPECGRAV", "PROTEINUA", "GLUCUA", "KETONESU", "BILIRUBINUA", "OCCULTUA", "NITRITE", "UROBILINOGEN", "LEUKOCYTESUR", "RBCUA", "WBCUA", "BACTERIA", "SQUAMEPITHEL", "HYALINECASTS" in the last 168 hours.    Invalid input(s): "WRIGHTSUR"  All pertinent labs results from the past 24 hours have been reviewed.    Significant Imaging:  All pertinent imaging results/findings from the past 24 hours have been reviewed.  "

## 2024-02-16 NOTE — PROGRESS NOTES
[facility]  Colorectal Surgery  Progress Note    Patient Name: Edita Riley  MRN: [id number]  Admission Date: 2/6/2024  Hospital Length of Stay: 10 days  Attending Physician: Leslie Balbuena MD    Subjective:     Interval History: NAEO. Feeling well. Having bowel function. Ambulating. Tolerating diet.     Post-Op Info:  Procedure(s) (LRB):  REVISION, ANASTOMOSIS, URETEROILEAL (N/A)  OPEN LAPAROTOMY, EXPLORATORY (N/A)  CLOSURE, ILEOSTOMY (N/A)  ANASTOMOSIS, INTESTINE - Ileocolic anastomosis (N/A)  EXCISION, SMALL INTESTINE  LYSIS, ADHESIONS  LYSIS, ADHESIONS   10 Days Post-Op      Medications:  Continuous Infusions:  Scheduled Meds:   acetaminophen  1,000 mg Oral Q8H    heparin (porcine)  5,000 Units Subcutaneous Q8H    sodium chloride 0.9%  10 mL Intravenous Q6H     PRN Meds:   ibuprofen    iohexol    LIDOcaine HCL 10 mg/ml (1%)    melatonin    ondansetron    oxyCODONE    prochlorperazine    sodium chloride 0.9%    sodium chloride 0.9%        Objective:     Vital Signs (Most Recent):  Temp: 98.8 °F (37.1 °C) (02/16/24 0737)  Pulse: 86 (02/16/24 0737)  Resp: 20 (02/16/24 0737)  BP: (!) 114/53 (02/16/24 0737)  SpO2: 97 % (02/16/24 0737) Vital Signs (24h Range):  Temp:  [98.1 °F (36.7 °C)-98.8 °F (37.1 °C)] 98.8 °F (37.1 °C)  Pulse:  [] 86  Resp:  [15-20] 20  SpO2:  [94 %-98 %] 97 %  BP: (100-115)/(51-59) 114/53     Intake/Output - Last 3 Shifts         02/14 0700  02/15 0659 02/15 0700  02/16 0659 02/16 0700  02/17 0659    P.O. 545 240     Total Intake(mL/kg) 545 (6.6) 240 (2.9)     Urine (mL/kg/hr) 2225 (1.1) 3125 (1.6)     Drains 2      Stool 0 0     Total Output 2220 9674     Net -5504 -9278            Stool Occurrence 2 x 0 x     Emesis Occurrence  1 x              Physical Exam     Constitutional:       General: She is not in acute distress.  Cardiovascular:      Rate and Rhythm: Normal rate.      Pulses: Normal pulses.   Pulmonary:      Effort: Pulmonary effort is normal. No respiratory  distress.   Abdominal:      General: There is no distension. Inicisons c/d/I, drain minimal out, urostomy functional, preivous ileostomy site with scan drainage     Palpations: Abdomen is soft.      Tenderness:Minimally tender  Neurological:      General: No focal deficit present.      Mental Status: She is alert and oriented to person, place, and time.     Significant Labs:  BMP (Last 3 Results):   Recent Labs   Lab 02/14/24  0553 02/15/24  0600 02/16/24  0230    92 82    139 139   K 4.7 4.7 4.2    102 101   CO2 28 31* 32*   BUN 12 10 9   CREATININE 0.9 1.1 1.1   CALCIUM 8.5* 8.6* 9.1   MG 2.1 2.1 2.0     CBC (Last 3 Results):   Recent Labs   Lab 02/14/24  0556 02/15/24  0600 02/16/24  0230   WBC 8.80 9.14 7.93   RBC 3.05* 2.88* 2.83*   HGB 8.8* 8.3* 7.9*   HCT 29.4* 27.8* 26.9*    263 272   MCV 96 97 95   MCH 28.9 28.8 27.9   MCHC 29.9* 29.9* 29.4*     CRP (Last 3 Results):   Recent Labs   Lab 02/14/24  0553 02/15/24  0600 02/16/24  0230   CRP 64.5* 83.2* 89.6*       Significant Diagnostics:  None  Assessment/Plan:     Cervical cancer  Edita Riley is a 59 y.o. year old female with history of ureteral stenosis after radiation for cervical cancer requiring urostomy (transverse colon conduit) complicated by anastomotic leak requiring right colectomy and end ileostomy now s/p ex lap, extensive maliha, revision of urostomy, small bowel resection, ileostomy reversal w/ ileosigmoid anastomosis on 2/6    Clinically improved with return of bowel function. No N/V.      Plan:  Tolerating solids  Having bowel function  Ambulate, IS  CRP trending up last three days. CT abdomen pelvis with IV and PO contrast ordered to evaluate for possible infectious source. If Ct without significant pathology patient would be ok for DC from CRS standpoint.   Rest per primary          Bill Mercado MD  Colorectal Surgery  [facility]

## 2024-02-17 LAB
ANION GAP SERPL CALC-SCNC: 12 MMOL/L (ref 8–16)
ANION GAP SERPL CALC-SCNC: 9 MMOL/L (ref 8–16)
BASOPHILS # BLD AUTO: 0.05 K/UL (ref 0–0.2)
BASOPHILS # BLD AUTO: 0.05 K/UL (ref 0–0.2)
BASOPHILS NFR BLD: 0.6 % (ref 0–1.9)
BASOPHILS NFR BLD: 0.6 % (ref 0–1.9)
BUN SERPL-MCNC: 10 MG/DL (ref 8–23)
BUN SERPL-MCNC: 8 MG/DL (ref 8–23)
CALCIUM SERPL-MCNC: 9.1 MG/DL (ref 8.7–10.5)
CALCIUM SERPL-MCNC: 9.2 MG/DL (ref 8.7–10.5)
CHLORIDE SERPL-SCNC: 101 MMOL/L (ref 95–110)
CHLORIDE SERPL-SCNC: 102 MMOL/L (ref 95–110)
CO2 SERPL-SCNC: 26 MMOL/L (ref 23–29)
CO2 SERPL-SCNC: 28 MMOL/L (ref 23–29)
CREAT SERPL-MCNC: 1.5 MG/DL (ref 0.5–1.4)
CREAT SERPL-MCNC: 1.5 MG/DL (ref 0.5–1.4)
CRP SERPL-MCNC: 92.1 MG/L (ref 0–8.2)
DIFFERENTIAL METHOD BLD: ABNORMAL
DIFFERENTIAL METHOD BLD: ABNORMAL
EOSINOPHIL # BLD AUTO: 0.5 K/UL (ref 0–0.5)
EOSINOPHIL # BLD AUTO: 0.5 K/UL (ref 0–0.5)
EOSINOPHIL NFR BLD: 5.7 % (ref 0–8)
EOSINOPHIL NFR BLD: 5.7 % (ref 0–8)
ERYTHROCYTE [DISTWIDTH] IN BLOOD BY AUTOMATED COUNT: 15.8 % (ref 11.5–14.5)
ERYTHROCYTE [DISTWIDTH] IN BLOOD BY AUTOMATED COUNT: 15.8 % (ref 11.5–14.5)
EST. GFR  (NO RACE VARIABLE): 39.4 ML/MIN/1.73 M^2
EST. GFR  (NO RACE VARIABLE): 39.4 ML/MIN/1.73 M^2
GLUCOSE SERPL-MCNC: 82 MG/DL (ref 70–110)
GLUCOSE SERPL-MCNC: 83 MG/DL (ref 70–110)
HCT VFR BLD AUTO: 32.6 % (ref 37–48.5)
HCT VFR BLD AUTO: 32.6 % (ref 37–48.5)
HGB BLD-MCNC: 9.5 G/DL (ref 12–16)
HGB BLD-MCNC: 9.5 G/DL (ref 12–16)
IMM GRANULOCYTES # BLD AUTO: 0.03 K/UL (ref 0–0.04)
IMM GRANULOCYTES # BLD AUTO: 0.03 K/UL (ref 0–0.04)
IMM GRANULOCYTES NFR BLD AUTO: 0.3 % (ref 0–0.5)
IMM GRANULOCYTES NFR BLD AUTO: 0.3 % (ref 0–0.5)
LYMPHOCYTES # BLD AUTO: 1.9 K/UL (ref 1–4.8)
LYMPHOCYTES # BLD AUTO: 1.9 K/UL (ref 1–4.8)
LYMPHOCYTES NFR BLD: 21.6 % (ref 18–48)
LYMPHOCYTES NFR BLD: 21.6 % (ref 18–48)
MAGNESIUM SERPL-MCNC: 2.1 MG/DL (ref 1.6–2.6)
MCH RBC QN AUTO: 28.1 PG (ref 27–31)
MCH RBC QN AUTO: 28.1 PG (ref 27–31)
MCHC RBC AUTO-ENTMCNC: 29.1 G/DL (ref 32–36)
MCHC RBC AUTO-ENTMCNC: 29.1 G/DL (ref 32–36)
MCV RBC AUTO: 96 FL (ref 82–98)
MCV RBC AUTO: 96 FL (ref 82–98)
MONOCYTES # BLD AUTO: 1.3 K/UL (ref 0.3–1)
MONOCYTES # BLD AUTO: 1.3 K/UL (ref 0.3–1)
MONOCYTES NFR BLD: 14.5 % (ref 4–15)
MONOCYTES NFR BLD: 14.5 % (ref 4–15)
NEUTROPHILS # BLD AUTO: 5 K/UL (ref 1.8–7.7)
NEUTROPHILS # BLD AUTO: 5 K/UL (ref 1.8–7.7)
NEUTROPHILS NFR BLD: 57.3 % (ref 38–73)
NEUTROPHILS NFR BLD: 57.3 % (ref 38–73)
NRBC BLD-RTO: 0 /100 WBC
NRBC BLD-RTO: 0 /100 WBC
PHOSPHATE SERPL-MCNC: 4 MG/DL (ref 2.7–4.5)
PLATELET # BLD AUTO: 441 K/UL (ref 150–450)
PLATELET # BLD AUTO: 441 K/UL (ref 150–450)
PMV BLD AUTO: 9.8 FL (ref 9.2–12.9)
PMV BLD AUTO: 9.8 FL (ref 9.2–12.9)
POTASSIUM SERPL-SCNC: 4.1 MMOL/L (ref 3.5–5.1)
POTASSIUM SERPL-SCNC: 4.3 MMOL/L (ref 3.5–5.1)
RBC # BLD AUTO: 3.38 M/UL (ref 4–5.4)
RBC # BLD AUTO: 3.38 M/UL (ref 4–5.4)
SODIUM SERPL-SCNC: 138 MMOL/L (ref 136–145)
SODIUM SERPL-SCNC: 140 MMOL/L (ref 136–145)
WBC # BLD AUTO: 8.63 K/UL (ref 3.9–12.7)
WBC # BLD AUTO: 8.63 K/UL (ref 3.9–12.7)

## 2024-02-17 PROCEDURE — 36415 COLL VENOUS BLD VENIPUNCTURE: CPT | Performed by: UROLOGY

## 2024-02-17 PROCEDURE — 63600175 PHARM REV CODE 636 W HCPCS: Performed by: STUDENT IN AN ORGANIZED HEALTH CARE EDUCATION/TRAINING PROGRAM

## 2024-02-17 PROCEDURE — 25000003 PHARM REV CODE 250: Performed by: UROLOGY

## 2024-02-17 PROCEDURE — 80048 BASIC METABOLIC PNL TOTAL CA: CPT | Mod: 91 | Performed by: SURGERY

## 2024-02-17 PROCEDURE — 83735 ASSAY OF MAGNESIUM: CPT | Performed by: UROLOGY

## 2024-02-17 PROCEDURE — 97116 GAIT TRAINING THERAPY: CPT | Mod: CQ

## 2024-02-17 PROCEDURE — 85025 COMPLETE CBC W/AUTO DIFF WBC: CPT | Performed by: UROLOGY

## 2024-02-17 PROCEDURE — 86140 C-REACTIVE PROTEIN: CPT | Performed by: UROLOGY

## 2024-02-17 PROCEDURE — 20600001 HC STEP DOWN PRIVATE ROOM

## 2024-02-17 PROCEDURE — A4216 STERILE WATER/SALINE, 10 ML: HCPCS | Performed by: UROLOGY

## 2024-02-17 PROCEDURE — 25000003 PHARM REV CODE 250

## 2024-02-17 PROCEDURE — 36415 COLL VENOUS BLD VENIPUNCTURE: CPT | Mod: XB | Performed by: SURGERY

## 2024-02-17 PROCEDURE — 97110 THERAPEUTIC EXERCISES: CPT | Mod: CQ

## 2024-02-17 PROCEDURE — 80048 BASIC METABOLIC PNL TOTAL CA: CPT | Performed by: UROLOGY

## 2024-02-17 PROCEDURE — 84100 ASSAY OF PHOSPHORUS: CPT | Performed by: UROLOGY

## 2024-02-17 RX ADMIN — Medication 10 ML: at 12:02

## 2024-02-17 RX ADMIN — HEPARIN SODIUM 5000 UNITS: 5000 INJECTION INTRAVENOUS; SUBCUTANEOUS at 05:02

## 2024-02-17 RX ADMIN — Medication 10 ML: at 06:02

## 2024-02-17 RX ADMIN — SODIUM CHLORIDE, POTASSIUM CHLORIDE, SODIUM LACTATE AND CALCIUM CHLORIDE 1000 ML: 600; 310; 30; 20 INJECTION, SOLUTION INTRAVENOUS at 09:02

## 2024-02-17 RX ADMIN — HEPARIN SODIUM 5000 UNITS: 5000 INJECTION INTRAVENOUS; SUBCUTANEOUS at 01:02

## 2024-02-17 RX ADMIN — Medication 10 ML: at 01:02

## 2024-02-17 RX ADMIN — ACETAMINOPHEN 1000 MG: 500 TABLET ORAL at 05:02

## 2024-02-17 RX ADMIN — ACETAMINOPHEN 1000 MG: 500 TABLET ORAL at 08:02

## 2024-02-17 RX ADMIN — Medication 10 ML: at 05:02

## 2024-02-17 RX ADMIN — HEPARIN SODIUM 5000 UNITS: 5000 INJECTION INTRAVENOUS; SUBCUTANEOUS at 08:02

## 2024-02-17 NOTE — PLAN OF CARE
Problem: Adult Inpatient Plan of Care  Goal: Plan of Care Review  Outcome: Ongoing, Progressing  Goal: Patient-Specific Goal (Individualized)  Outcome: Ongoing, Progressing  Goal: Absence of Hospital-Acquired Illness or Injury  Outcome: Ongoing, Progressing  Goal: Optimal Comfort and Wellbeing  Outcome: Ongoing, Progressing  Goal: Readiness for Transition of Care  Outcome: Ongoing, Progressing   [facility] Plan of Care Note  Dx Hydronephrosis of left kidney    Shift Events Creatinine up to 1.5, 1L bolus LR given,  ordered to encourage pt to drink water - 3 big pitchers/ a day- pt has been drinking water; no nausea, consumed 100% her meals ; had 1 BM; ambulated the fonseca x3- sat up on the chair most of the time    Goals of Care: VS and lab WDL    Neuro: AAOx4- some mild  facial twitching- hx CVA    Vital Signs: stable    Respiratory: RA    Diet: regular    Is patient tolerating current diet? Yes  GTTS: none    Urine Output/Bowel Movement: urostomy bag with adequate amount yellow-sediment urine; 1BM    Drains/Tubes/Tube Feeds (include total output/shift): none    Lines: PICC line- flushed well but no blood returned      Accuchecks:none    Skin: intact except old ORESTES drain removal site+ old ostomy closure site with gauze + tape CDI ; old nephrostomy tube removal  site on the back - dressing changed per MD order CDI; skin breakdown on buttock- barrier cream applied     Fall Risk Score: 10    Activity level? Standby assist    Any scheduled procedures? none    Any safety concerns? Fall risk precaution    Other: none

## 2024-02-17 NOTE — PROGRESS NOTES
[facility]  Urology  Progress Note    Patient Name: Edita Riley  MRN: [id number]  Admission Date: 2/6/2024  Hospital Length of Stay: 11 days  Code Status: Full Code   Attending Provider: Leslie Balbuena MD   Primary Care Physician: Trina Vu MD    Subjective:     HPI:  Edita Riley is a 58 y.o. female who presents with a history of radiation therapy for cervical cancer which was complicated by bilateral distal ureteral obstruction with bilateral hydronephrosis.  She is status post colon conduit which was performed in 2020.  This was complicated by large bowel perforation away from the anastamosis.  She has an ileostomy and a left percutaneous nephrostomy that was last exchanged on 10/26/2023.      She is post op day 11 status post ex lap, extensive lysis of adhesions, left redo ureterocolic anastomosis by urology and takedown of ileostomy and ileocolic anastamosis by colorectal surgery.     Interval History:   NAEO. AFVSS.  Pain well controlled. No further emesis.  Some breakdown of RUQ ileostomy wound, dressing changed.  Urostomy with mucus and clear yellow urine.  PICC line no longer functioning, labs drawn and in process.        Objective:     Temp:  [97.4 °F (36.3 °C)-98.6 °F (37 °C)] 98.2 °F (36.8 °C)  Pulse:  [76-97] 84  Resp:  [18-20] 20  SpO2:  [94 %-98 %] 94 %  BP: ()/(53-71) 118/56     Body mass index is 26.7 kg/m².           Drains       Drain  Duration                  Urostomy 02/06/24 1815 ureterostomy, left 10 days                     Physical Exam  Constitutional:       General: She is not in acute distress.     Appearance: Normal appearance.   HENT:      Head: Normocephalic and atraumatic.      Nose: Nose normal.   Eyes:      Conjunctiva/sclera: Conjunctivae normal.   Cardiovascular:      Rate and Rhythm: Normal rate.   Pulmonary:      Effort: Pulmonary effort is normal. No respiratory distress.   Abdominal:      Palpations: Abdomen is soft.       "Comments: Midline Incision CDI  RUQ ileostomy wound with mild breakdown, no erythema or induration; no discharge or purulence.  Abdomen appropriately ttp. Soft, non-distended.   Urostomy in place, stent appears appropriately placed, urostomy with CYU output, some mucus output  LUQ ORESTES Site dressed without strikethrough   Musculoskeletal:         General: No deformity.   Skin:     General: Skin is warm and dry.   Neurological:      General: No focal deficit present.      Mental Status: She is alert.   Psychiatric:         Mood and Affect: Mood normal.         Behavior: Behavior normal.           Significant Labs:    BMP:  Recent Labs   Lab 02/14/24  0553 02/15/24  0600 02/16/24  0230    139 139   K 4.7 4.7 4.2    102 101   CO2 28 31* 32*   BUN 12 10 9   CREATININE 0.9 1.1 1.1   CALCIUM 8.5* 8.6* 9.1       CBC:   Recent Labs   Lab 02/14/24  0556 02/15/24  0600 02/16/24  0230   WBC 8.80 9.14 7.93   HGB 8.8* 8.3* 7.9*   HCT 29.4* 27.8* 26.9*    263 272       Blood Culture: No results for input(s): "LABBLOO" in the last 168 hours.  Urine Culture: No results for input(s): "LABURIN" in the last 168 hours.  Urine Studies: No results for input(s): "COLORU", "APPEARANCEUA", "PHUR", "SPECGRAV", "PROTEINUA", "GLUCUA", "KETONESU", "BILIRUBINUA", "OCCULTUA", "NITRITE", "UROBILINOGEN", "LEUKOCYTESUR", "RBCUA", "WBCUA", "BACTERIA", "SQUAMEPITHEL", "HYALINECASTS" in the last 168 hours.    Invalid input(s): "WRIGHTSUR"    Significant Imaging:  All pertinent imaging results/findings from the past 24 hours have been reviewed.  CT scan yesterday without concern for abscess or leak.      Assessment/Plan:     Ileostomy in place  Edita Riley is a 59 y.o. female with a history of cervical cancer s/p XRT complicated by end-stage bladder and distal ureteral strictures. She underwent urinary diversion with a transverse colon conduit in 2020 complicated by a bowel perforation resulting in an ileostomy creation " for GI diversion. She is now s/p surgical repair of her left ureterocolic anastomotic stricture and ileostomy reversal on 2/6/24.     - CRP today in process, will discuss with CRS  - Pain - MMPC  - Diet - continue regular diet  - ORESTES drain removed at bedside   - Nausea control w/ PRN antiemetics   - OOB, ambulate 5x daily    - PT/OT  - Encourage IS  - DVT ppx: sqh   - daily labs  - restarting home meds as appropriate   - Replete lytes PRN  - Please include nursing notes for shift, patient may be unreliable historian to surgical team on morning rounds   - Patient was denied for home health due to insurance concerns  - Appreciate ostomy nurses' assistance  - ID recommending dc of antibiotics     - Dispo: will discuss discharge with CRS and after assessed by Dr. Balbuena  - will d/c PICC before discharge           VTE Risk Mitigation (From admission, onward)           Ordered     heparin (porcine) injection 5,000 Units  Every 8 hours         02/06/24 1725     Place ANANT hose  Until discontinued         02/06/24 0526     Place sequential compression device  Until discontinued         02/06/24 0526                    Dave Tatum MD  Urology  [facility]    Patient was seen and examined.  Agree with the above.

## 2024-02-17 NOTE — SUBJECTIVE & OBJECTIVE
Subjective:     Interval History: NAEON. Pt overall feeling well. Tolerating diet. Denies N/V. Admits flatus. Reports pain is well controlled. OOB as tolerated.      Post-Op Info:  Procedure(s) (LRB):  REVISION, ANASTOMOSIS, URETEROILEAL (N/A)  OPEN LAPAROTOMY, EXPLORATORY (N/A)  CLOSURE, ILEOSTOMY (N/A)  ANASTOMOSIS, INTESTINE - Ileocolic anastomosis (N/A)  EXCISION, SMALL INTESTINE  LYSIS, ADHESIONS  LYSIS, ADHESIONS   11 Days Post-Op      Medications:  Continuous Infusions:  Scheduled Meds:   acetaminophen  1,000 mg Oral Q8H    heparin (porcine)  5,000 Units Subcutaneous Q8H    sodium chloride 0.9%  10 mL Intravenous Q6H     PRN Meds:   ibuprofen    iohexol    LIDOcaine HCL 10 mg/ml (1%)    melatonin    ondansetron    oxyCODONE    prochlorperazine    sodium chloride 0.9%    sodium chloride 0.9%        Objective:     Vital Signs (Most Recent):  Temp: 98.2 °F (36.8 °C) (02/17/24 0724)  Pulse: 84 (02/17/24 0724)  Resp: 20 (02/17/24 0724)  BP: (!) 118/56 (02/17/24 0724)  SpO2: (!) 94 % (02/17/24 0724) Vital Signs (24h Range):  Temp:  [97.4 °F (36.3 °C)-98.6 °F (37 °C)] 98.2 °F (36.8 °C)  Pulse:  [76-97] 84  Resp:  [18-20] 20  SpO2:  [94 %-98 %] 94 %  BP: ()/(53-71) 118/56     Intake/Output - Last 3 Shifts         02/15 0700  02/16 0659 02/16 0700 02/17 0659 02/17 0700 02/18 0659    P.O. 240  300    IV Piggyback   999    Total Intake(mL/kg) 240 (2.9)  1299 (15.8)    Urine (mL/kg/hr) 3125 (1.6)  400 (1.2)    Drains       Stool 0      Total Output 3125  400    Net -2885  +899           Stool Occurrence 0 x 1 x     Emesis Occurrence 1 x               Physical Exam     Constitutional:       General: She is not in acute distress.  Cardiovascular:      Rate and Rhythm: Normal rate.      Pulses: Normal pulses.   Pulmonary:      Effort: Pulmonary effort is normal. No respiratory distress.   Abdominal:      General: There is no distension. Inicisons c/d/I, drain minimal out, urostomy functional, preivous ileostomy  site with scan drainage     Palpations: Abdomen is soft.      Tenderness:Minimally tender  Neurological:      General: No focal deficit present.      Mental Status: She is alert and oriented to person, place, and time.   Significant Labs:  All pertinent lab results within the last 24 hours have been reviewed.     Significant Diagnostics:  I have reviewed all pertinent imaging results/findings within the past 24 hours.

## 2024-02-17 NOTE — PROGRESS NOTES
[facility]  Colorectal Surgery  Progress Note    Patient Name: Edita Riley  MRN: [id number]  Admission Date: 2/6/2024  Hospital Length of Stay: 11 days  Attending Physician: Leslie Balbuena MD    Subjective:     Interval History: NAEON. Pt overall feeling well. Tolerating diet. Denies N/V. Admits flatus. Reports pain is well controlled. OOB as tolerated.      Post-Op Info:  Procedure(s) (LRB):  REVISION, ANASTOMOSIS, URETEROILEAL (N/A)  OPEN LAPAROTOMY, EXPLORATORY (N/A)  CLOSURE, ILEOSTOMY (N/A)  ANASTOMOSIS, INTESTINE - Ileocolic anastomosis (N/A)  EXCISION, SMALL INTESTINE  LYSIS, ADHESIONS  LYSIS, ADHESIONS   11 Days Post-Op      Medications:  Continuous Infusions:  Scheduled Meds:   acetaminophen  1,000 mg Oral Q8H    heparin (porcine)  5,000 Units Subcutaneous Q8H    sodium chloride 0.9%  10 mL Intravenous Q6H     PRN Meds:   ibuprofen    iohexol    LIDOcaine HCL 10 mg/ml (1%)    melatonin    ondansetron    oxyCODONE    prochlorperazine    sodium chloride 0.9%    sodium chloride 0.9%        Objective:     Vital Signs (Most Recent):  Temp: 98.2 °F (36.8 °C) (02/17/24 0724)  Pulse: 84 (02/17/24 0724)  Resp: 20 (02/17/24 0724)  BP: (!) 118/56 (02/17/24 0724)  SpO2: (!) 94 % (02/17/24 0724) Vital Signs (24h Range):  Temp:  [97.4 °F (36.3 °C)-98.6 °F (37 °C)] 98.2 °F (36.8 °C)  Pulse:  [76-97] 84  Resp:  [18-20] 20  SpO2:  [94 %-98 %] 94 %  BP: ()/(53-71) 118/56     Intake/Output - Last 3 Shifts         02/15 0700  02/16 0659 02/16 0700 02/17 0659 02/17 0700 02/18 0659    P.O. 240  300    IV Piggyback   999    Total Intake(mL/kg) 240 (2.9)  1299 (15.8)    Urine (mL/kg/hr) 3125 (1.6)  400 (1.2)    Drains       Stool 0      Total Output 3125  400    Net -2885  +899           Stool Occurrence 0 x 1 x     Emesis Occurrence 1 x               Physical Exam     Constitutional:       General: She is not in acute distress.  Cardiovascular:      Rate and Rhythm: Normal rate.      Pulses: Normal  pulses.   Pulmonary:      Effort: Pulmonary effort is normal. No respiratory distress.   Abdominal:      General: There is no distension. Inicisons c/d/I, drain minimal out, urostomy functional, preivous ileostomy site with scan drainage     Palpations: Abdomen is soft.      Tenderness:Minimally tender  Neurological:      General: No focal deficit present.      Mental Status: She is alert and oriented to person, place, and time.   Significant Labs:  All pertinent lab results within the last 24 hours have been reviewed.     Significant Diagnostics:  I have reviewed all pertinent imaging results/findings within the past 24 hours.  Assessment/Plan:     Cervical cancer  Edita Riley is a 59 y.o. year old female with history of ureteral stenosis after radiation for cervical cancer requiring urostomy (transverse colon conduit) complicated by anastomotic leak requiring right colectomy and end ileostomy now s/p ex lap, extensive maliha, revision of urostomy, small bowel resection, ileostomy reversal w/ ileosigmoid anastomosis on 2/6. Clinically improved with return of bowel function. CT on 2/16 was wnl. Cr slight increase.     Plan:  Tolerating solids  Rec bolus of fluids  Having bowel function  Ambulate, IS       Ok for DC from CRS standpoint after CRP returns  Rest per primary    Juan José Cottrell MD  Colorectal Surgery  [facility]

## 2024-02-17 NOTE — PT/OT/SLP PROGRESS
Physical Therapy Treatment    Patient Name:  Edita Riley   MRN:  [id number]    Recommendations:     Discharge Recommendations: Low Intensity Therapy  Discharge Equipment Recommendations: none  Barriers to discharge: None    Assessment:     Edita Riley is a 59 y.o. female admitted with a medical diagnosis of Hydronephrosis of left kidney.  She presents with the following impairments/functional limitations: weakness, impaired endurance, impaired self care skills, impaired functional mobility, gait instability, impaired balance . presents with  improved overall functional mobility requiring decreased assistance and increased activity tolerance to perform functional mobility.Pt would continue to benefit from skilled PT to address overall functional mobility, to return to functional baseline and goals. Goals remain appropriate.          Rehab Prognosis: Good; patient would benefit from acute skilled PT services to address these deficits and reach maximum level of function.    Recent Surgery: Procedure(s) (LRB):  REVISION, ANASTOMOSIS, URETEROILEAL (N/A)  OPEN LAPAROTOMY, EXPLORATORY (N/A)  CLOSURE, ILEOSTOMY (N/A)  ANASTOMOSIS, INTESTINE - Ileocolic anastomosis (N/A)  EXCISION, SMALL INTESTINE  LYSIS, ADHESIONS  LYSIS, ADHESIONS 11 Days Post-Op    Plan:     During this hospitalization, patient to be seen 4 x/week to address the identified rehab impairments via gait training, therapeutic activities, therapeutic exercises and progress toward the following goals:    Plan of Care Expires:  03/08/24    Subjective     Chief Complaint: no c/o  Patient/Family Comments/goals: I am ready  Pain/Comfort:  Pain Rating 1:  (not rated)  Location - Orientation 1: generalized  Location 1: abdomen  Pain Addressed 1: Reposition, Distraction      Objective:     Communicated with RN prior to session.  Patient found up in chair with peripheral IV, nino catheter upon PT entry to room.     General Precautions:  Standard, fall  Orthopedic Precautions: N/A  Braces: N/A  Respiratory Status: Room air     Functional Mobility:  Transfers:     Sit to Stand:  (S) with rolling walker  Gait:100 ft x 2 and  200' x 2  with RW and SBA with slow, steady gait without LOB. rest break between trials      AM-PAC 6 CLICK MOBILITY  Turning over in bed (including adjusting bedclothes, sheets and blankets)?: 3  Sitting down on and standing up from a chair with arms (e.g., wheelchair, bedside commode, etc.): 3  Moving from lying on back to sitting on the side of the bed?: 3  Moving to and from a bed to a chair (including a wheelchair)?: 3  Need to walk in hospital room?: 3  Climbing 3-5 steps with a railing?: 3  Basic Mobility Total Score: 18       Treatment & Education:  Therapist provided instruction and educated of  patient on progress, safety,d/c,PT POC,   proper body mechanics, energy conservation, and fall prevention strategies during tasks listed above, on the effects of prolonged immobility and the importance of performing OOB activity and exercises to promote healing and reduce recovery time    Pt issued and instructed to perform  supine and seated HEP 2-3 times daily, with verabal understanding  Updated white board with appropriate PT mobility information for medical team notification  Pt safe to ambulate in hallway with RN or PCT assistance  Call nursing/pct to transfer to chair/use bathroom. Pt stated understanding  Bedside table in front of patient and area set up for function, convenience, and safety. RN aware of patient's mobility needs and status. Questions/concerns addressed within PTA scope of practice; patient  with no further questions. Time was provided for active listening, discussion of health disposition, and discussion of safe discharge. Pt?verbalized?agreement .   Patient  facilitated therex seated in bedside chair B LE AROM AP, LAQ, Hip Flexion, Hip Abd/Add. Patient required skilled PTA for instruction of exercises and  appropriate cues to perform exercises safely, sequencing and appropriately.   Exercises performed to develop and maintain pt's strength, endurance and flexibility.   Patient left up in chair with all lines intact, call button in reach, and nsg notified.    GOALS:   Multidisciplinary Problems       Physical Therapy Goals          Problem: Physical Therapy    Goal Priority Disciplines Outcome Goal Variances Interventions   Physical Therapy Goal     PT, PT/OT Ongoing, Progressing     Description: Goals to be met by: 2024     Patient will increase functional independence with mobility by performin. Supine to sit with Set-up Grand  2. Sit to supine with Set-up Grand  3. Sit to stand transfer with Supervision  4. Bed to chair transfer with Supervision using Rolling Walker  5. Gait  x 250 feet with Supervision using Rolling Walker.   6. Ascend/descend 4 stair with left Handrails Stand-by Assistance using No Assistive Device.   7. Lower extremity exercise program x15 reps per handout, with supervision                         Time Tracking:     PT Received On: 24  PT Start Time: 0940     PT Stop Time: 1019  PT Total Time (min): 39 min     Billable Minutes: Gait Training 30 and Therapeutic Exercise 9    Treatment Type: Treatment  PT/PTA: PTA     Number of PTA visits since last PT visit: 3     2024

## 2024-02-17 NOTE — ASSESSMENT & PLAN NOTE
Edita Riley is a 59 y.o. female with a history of cervical cancer s/p XRT complicated by end-stage bladder and distal ureteral strictures. She underwent urinary diversion with a transverse colon conduit in 2020 complicated by a bowel perforation resulting in an ileostomy creation for GI diversion. She is now s/p surgical repair of her left ureterocolic anastomotic stricture and ileostomy reversal on 2/6/24.     - CRP today in process, will discuss with CRS  - Pain - MMPC  - Diet - continue regular diet  - ORESTES drain removed at bedside   - Nausea control w/ PRN antiemetics   - OOB, ambulate 5x daily    - PT/OT  - Encourage IS  - DVT ppx: sqh   - daily labs  - restarting home meds as appropriate   - Replete lytes PRN  - Please include nursing notes for shift, patient may be unreliable historian to surgical team on morning rounds   - Patient was denied for home health due to insurance concerns  - Appreciate ostomy nurses' assistance  - ID recommending dc of antibiotics     - Dispo: will discuss discharge with CRS and after assessed by Dr. Balbuena  - will d/c PICC before discharge

## 2024-02-17 NOTE — SUBJECTIVE & OBJECTIVE
Interval History:   NAEO. AFVSS.  Pain well controlled. No further emesis.  Some breakdown of RUQ ileostomy wound, dressing changed.  Urostomy with mucus and clear yellow urine.  PICC line no longer functioning, labs drawn and in process.        Objective:     Temp:  [97.4 °F (36.3 °C)-98.6 °F (37 °C)] 98.2 °F (36.8 °C)  Pulse:  [76-97] 84  Resp:  [18-20] 20  SpO2:  [94 %-98 %] 94 %  BP: ()/(53-71) 118/56     Body mass index is 26.7 kg/m².           Drains       Drain  Duration                  Urostomy 02/06/24 1815 ureterostomy, left 10 days                     Physical Exam  Constitutional:       General: She is not in acute distress.     Appearance: Normal appearance.   HENT:      Head: Normocephalic and atraumatic.      Nose: Nose normal.   Eyes:      Conjunctiva/sclera: Conjunctivae normal.   Cardiovascular:      Rate and Rhythm: Normal rate.   Pulmonary:      Effort: Pulmonary effort is normal. No respiratory distress.   Abdominal:      Palpations: Abdomen is soft.      Comments: Midline Incision CDI  RUQ ileostomy wound with mild breakdown, no erythema or induration; no discharge or purulence.  Abdomen appropriately ttp. Soft, non-distended.   Urostomy in place, stent appears appropriately placed, urostomy with CYU output, some mucus output  L NT in place with cyu  LUQ ORESTES Site dressed without strikethrough   Musculoskeletal:         General: No deformity.   Skin:     General: Skin is warm and dry.   Neurological:      General: No focal deficit present.      Mental Status: She is alert.   Psychiatric:         Mood and Affect: Mood normal.         Behavior: Behavior normal.           Significant Labs:    BMP:  Recent Labs   Lab 02/14/24  0553 02/15/24  0600 02/16/24  0230    139 139   K 4.7 4.7 4.2    102 101   CO2 28 31* 32*   BUN 12 10 9   CREATININE 0.9 1.1 1.1   CALCIUM 8.5* 8.6* 9.1       CBC:   Recent Labs   Lab 02/14/24  0556 02/15/24  0600 02/16/24  0230   WBC 8.80 9.14 7.93   HGB  "8.8* 8.3* 7.9*   HCT 29.4* 27.8* 26.9*    263 272       Blood Culture: No results for input(s): "LABBLOO" in the last 168 hours.  Urine Culture: No results for input(s): "LABURIN" in the last 168 hours.  Urine Studies: No results for input(s): "COLORU", "APPEARANCEUA", "PHUR", "SPECGRAV", "PROTEINUA", "GLUCUA", "KETONESU", "BILIRUBINUA", "OCCULTUA", "NITRITE", "UROBILINOGEN", "LEUKOCYTESUR", "RBCUA", "WBCUA", "BACTERIA", "SQUAMEPITHEL", "HYALINECASTS" in the last 168 hours.    Invalid input(s): "WRIGHTSUR"    Significant Imaging:  All pertinent imaging results/findings from the past 24 hours have been reviewed.  CT scan yesterday without concern for abscess or leak.    "

## 2024-02-18 VITALS
HEIGHT: 69 IN | SYSTOLIC BLOOD PRESSURE: 109 MMHG | DIASTOLIC BLOOD PRESSURE: 55 MMHG | BODY MASS INDEX: 26.77 KG/M2 | RESPIRATION RATE: 18 BRPM | TEMPERATURE: 98 F | HEART RATE: 84 BPM | OXYGEN SATURATION: 95 % | WEIGHT: 180.75 LBS

## 2024-02-18 LAB
ANION GAP SERPL CALC-SCNC: 6 MMOL/L (ref 8–16)
BASOPHILS # BLD AUTO: 0.05 K/UL (ref 0–0.2)
BASOPHILS NFR BLD: 0.7 % (ref 0–1.9)
BUN SERPL-MCNC: 10 MG/DL (ref 8–23)
CALCIUM SERPL-MCNC: 8.7 MG/DL (ref 8.7–10.5)
CHLORIDE SERPL-SCNC: 105 MMOL/L (ref 95–110)
CO2 SERPL-SCNC: 28 MMOL/L (ref 23–29)
CREAT SERPL-MCNC: 1.5 MG/DL (ref 0.5–1.4)
CRP SERPL-MCNC: 87.4 MG/L (ref 0–8.2)
DIFFERENTIAL METHOD BLD: ABNORMAL
EOSINOPHIL # BLD AUTO: 0.5 K/UL (ref 0–0.5)
EOSINOPHIL NFR BLD: 7 % (ref 0–8)
ERYTHROCYTE [DISTWIDTH] IN BLOOD BY AUTOMATED COUNT: 15.7 % (ref 11.5–14.5)
EST. GFR  (NO RACE VARIABLE): 39.4 ML/MIN/1.73 M^2
GLUCOSE SERPL-MCNC: 86 MG/DL (ref 70–110)
HCT VFR BLD AUTO: 30.5 % (ref 37–48.5)
HGB BLD-MCNC: 8.8 G/DL (ref 12–16)
IMM GRANULOCYTES # BLD AUTO: 0.06 K/UL (ref 0–0.04)
IMM GRANULOCYTES NFR BLD AUTO: 0.8 % (ref 0–0.5)
LYMPHOCYTES # BLD AUTO: 1.4 K/UL (ref 1–4.8)
LYMPHOCYTES NFR BLD: 18.5 % (ref 18–48)
MAGNESIUM SERPL-MCNC: 1.9 MG/DL (ref 1.6–2.6)
MCH RBC QN AUTO: 28 PG (ref 27–31)
MCHC RBC AUTO-ENTMCNC: 28.9 G/DL (ref 32–36)
MCV RBC AUTO: 97 FL (ref 82–98)
MONOCYTES # BLD AUTO: 1.2 K/UL (ref 0.3–1)
MONOCYTES NFR BLD: 16.1 % (ref 4–15)
NEUTROPHILS # BLD AUTO: 4.4 K/UL (ref 1.8–7.7)
NEUTROPHILS NFR BLD: 56.9 % (ref 38–73)
NRBC BLD-RTO: 0 /100 WBC
PHOSPHATE SERPL-MCNC: 4.2 MG/DL (ref 2.7–4.5)
PLATELET # BLD AUTO: 435 K/UL (ref 150–450)
PMV BLD AUTO: 9.9 FL (ref 9.2–12.9)
POTASSIUM SERPL-SCNC: 3.9 MMOL/L (ref 3.5–5.1)
RBC # BLD AUTO: 3.14 M/UL (ref 4–5.4)
SODIUM SERPL-SCNC: 139 MMOL/L (ref 136–145)
WBC # BLD AUTO: 7.68 K/UL (ref 3.9–12.7)

## 2024-02-18 PROCEDURE — 85025 COMPLETE CBC W/AUTO DIFF WBC: CPT | Performed by: UROLOGY

## 2024-02-18 PROCEDURE — 80048 BASIC METABOLIC PNL TOTAL CA: CPT | Performed by: UROLOGY

## 2024-02-18 PROCEDURE — 36415 COLL VENOUS BLD VENIPUNCTURE: CPT | Performed by: UROLOGY

## 2024-02-18 PROCEDURE — 84100 ASSAY OF PHOSPHORUS: CPT | Performed by: UROLOGY

## 2024-02-18 PROCEDURE — A4216 STERILE WATER/SALINE, 10 ML: HCPCS | Performed by: UROLOGY

## 2024-02-18 PROCEDURE — 83735 ASSAY OF MAGNESIUM: CPT | Performed by: UROLOGY

## 2024-02-18 PROCEDURE — 86140 C-REACTIVE PROTEIN: CPT | Performed by: UROLOGY

## 2024-02-18 PROCEDURE — 63600175 PHARM REV CODE 636 W HCPCS: Performed by: STUDENT IN AN ORGANIZED HEALTH CARE EDUCATION/TRAINING PROGRAM

## 2024-02-18 PROCEDURE — 25000003 PHARM REV CODE 250: Performed by: UROLOGY

## 2024-02-18 PROCEDURE — 25000003 PHARM REV CODE 250

## 2024-02-18 RX ORDER — SULFAMETHOXAZOLE AND TRIMETHOPRIM 400; 80 MG/1; MG/1
1 TABLET ORAL DAILY
Qty: 14 TABLET | Refills: 0 | Status: SHIPPED | OUTPATIENT
Start: 2024-02-18 | End: 2024-03-15 | Stop reason: ALTCHOICE

## 2024-02-18 RX ORDER — ACETAMINOPHEN 500 MG
500 TABLET ORAL EVERY 6 HOURS PRN
Qty: 20 TABLET | Refills: 0 | Status: SHIPPED | OUTPATIENT
Start: 2024-02-18

## 2024-02-18 RX ADMIN — Medication 10 ML: at 12:02

## 2024-02-18 RX ADMIN — Medication 10 ML: at 05:02

## 2024-02-18 RX ADMIN — HEPARIN SODIUM 5000 UNITS: 5000 INJECTION INTRAVENOUS; SUBCUTANEOUS at 05:02

## 2024-02-18 RX ADMIN — ACETAMINOPHEN 1000 MG: 500 TABLET ORAL at 05:02

## 2024-02-18 NOTE — NURSING
Pt has been AAOx4, on RA, VS stable. Denied pain and nausea. Tolerated her diet. Ambulated independently with walker.   Urostomy bag intact, pt said she already knows how to change and take care of the bag herself.   Dressing on abdomen ( from old drain removal site) changed by  this morning, ABD midline incision with staples CDI  Dressing on the back ( from old nephrostomy tube removal site)  changed by nurse CDI  Thick barrier skin applied on the skin breakdown on buttock, extra cream provided to pt   Instructions given to pt and spouse about how to change the dressing, extra supplies provided  Extra urostomy bag provided   picked up meds from pharmacy  No questions at this time

## 2024-02-18 NOTE — PLAN OF CARE
[facility]  Discharge Final Note    Primary Care Provider: Trina Vu MD    Expected Discharge Date: 2/18/2024    Patient cleared for discharge from case management standpoint.    Final Discharge Note (most recent)       Final Note - 02/18/24 1150          Final Note    Assessment Type Final Discharge Note     Anticipated Discharge Disposition Home or Self Care     What phone number can be called within the next 1-3 days to see how you are doing after discharge? [phone]     Hospital Resources/Appts/Education Provided Provided patient/caregiver with written discharge plan information        Post-Acute Status    Coverage Medicaid - [employer]     Discharge Delays None known at this time                   Important Message from Medicare         Contact Info       Emilie Villarreal NP   Specialty: Urology, Family Medicine    [address] [facility] [address]   Phone: [phone]       Next Steps: Follow up in 2 week(s)    Instructions: Post-Op Follow-Up and staple removal    Leslie Balbuena MD   Specialty: Urology    [address]  [place] [address]   Phone: [phone]       Next Steps: Follow up in 4 week(s)    Instructions: Post-Op Follow-Up          Sarah Jones RN  Weekend  - [facility]  Spectralink: [phone]

## 2024-02-18 NOTE — PLAN OF CARE
[facility] Plan of Care Note  Dx Hydronephrosis of left kidney     Shift Events good urine output tonight/dressing changed to left  posterior flank     Goals of Care: VS and lab WDL     Neuro: AAOx4- some mild  facial twitching- hx CVA     Vital Signs: stable     Respiratory: RA     Diet: regular     Is patient tolerating current diet? Yes  GTTS: none     Urine Output/Bowel Movement: urostomy bag with adequate amount yellow-sediment urine.     Drains/Tubes/Tube Feeds (include total output/shift): none     Lines: PICC line- flushed well but no blood returned        Accuchecks:none     Skin: intact except old ORESTES drain removal site+ old ostomy closure site with gauze + tape CDI ; old nephrostomy tube removal  site on the back - dressing changed per MD order CDI     Fall Risk Score: 10     Activity level? Standby assist     Any scheduled procedures? none     Any safety concerns? Fall risk precaution     Other: none

## 2024-02-18 NOTE — SUBJECTIVE & OBJECTIVE
Interval History:   NAEO. AFVSS.  Ambulating halls several times.  No nausea or emesis, tolerating diet.  Denies pain.  Cr stable at 1.5  CRP slightly lower but still elevated at 87.4.      Objective:     Temp:  [97.7 °F (36.5 °C)-98.7 °F (37.1 °C)] 98.2 °F (36.8 °C)  Pulse:  [72-88] 72  Resp:  [18-20] 18  SpO2:  [94 %-100 %] 96 %  BP: ()/(50-68) 107/68     Body mass index is 26.7 kg/m².    Date 02/18/24 0700 - 02/19/24 0659   Shift 8299-6581 0966-4895 3230-6612 24 Hour Total   INTAKE   Shift Total(mL/kg)       OUTPUT   Urine(mL/kg/hr) 200   200   Shift Total(mL/kg) 200(2.4)   200(2.4)   Weight (kg) 82 82 82 82          Drains       Drain  Duration                  Urostomy 02/06/24 1815 ureterostomy, left 11 days                     Physical Exam  Constitutional:       General: She is not in acute distress.     Appearance: Normal appearance.   HENT:      Head: Normocephalic and atraumatic.      Nose: Nose normal.   Eyes:      Conjunctiva/sclera: Conjunctivae normal.   Cardiovascular:      Rate and Rhythm: Normal rate.   Pulmonary:      Effort: Pulmonary effort is normal. No respiratory distress.   Abdominal:      Palpations: Abdomen is soft.      Comments: Midline Incision CDI, staples present  RUQ ileostomy wound with mild breakdown, no erythema or induration; no discharge or purulence.  Abdomen appropriately ttp. Soft, non-distended. No CVA tenderness bilaterally.  Urostomy in place, stent appears appropriately placed, urostomy with CYU output, minimal mucus.  LUQ ORESTES Site dressed without strikethrough   Musculoskeletal:         General: No deformity.   Skin:     General: Skin is warm and dry.   Neurological:      General: No focal deficit present.      Mental Status: She is alert.   Psychiatric:         Mood and Affect: Mood normal.         Behavior: Behavior normal.           Significant Labs:    BMP:  Recent Labs   Lab 02/17/24  0741 02/17/24  1916 02/18/24  0427    138 139   K 4.1 4.3 3.9     "101 105   CO2 26 28 28   BUN 8 10 10   CREATININE 1.5* 1.5* 1.5*   CALCIUM 9.2 9.1 8.7       CBC:   Recent Labs   Lab 02/16/24  0230 02/17/24  0741 02/18/24  0427   WBC 7.93 8.63  8.63 7.68   HGB 7.9* 9.5*  9.5* 8.8*   HCT 26.9* 32.6*  32.6* 30.5*    441  441 435       Blood Culture: No results for input(s): "LABBLOO" in the last 168 hours.  Urine Culture: No results for input(s): "LABURIN" in the last 168 hours.  Urine Studies: No results for input(s): "COLORU", "APPEARANCEUA", "PHUR", "SPECGRAV", "PROTEINUA", "GLUCUA", "KETONESU", "BILIRUBINUA", "OCCULTUA", "NITRITE", "UROBILINOGEN", "LEUKOCYTESUR", "RBCUA", "WBCUA", "BACTERIA", "SQUAMEPITHEL", "HYALINECASTS" in the last 168 hours.    Invalid input(s): "WRIGHTSUR"    Significant Imaging:  All pertinent imaging results/findings from the past 24 hours have been reviewed.    "

## 2024-02-18 NOTE — PROGRESS NOTES
[facility]  Urology  Progress Note    Patient Name: Edita Riley  MRN: [id number]  Admission Date: 2/6/2024  Hospital Length of Stay: 12 days  Code Status: Full Code   Attending Provider: Leslie Balbuena MD   Primary Care Physician: Trina Vu MD    Subjective:     HPI:  Edita Riley is a 58 y.o. female who presents with a history of radiation therapy for cervical cancer which was complicated by bilateral distal ureteral obstruction with bilateral hydronephrosis.  She is status post colon conduit which was performed in 2020.  This was complicated by large bowel perforation away from the anastamosis.  She has an ileostomy and a left percutaneous nephrostomy that was last exchanged on 10/26/2023.  She was admitted to the hospital on 12/7/2023 after her percutaneous nephrostomy tube was clogged and she had been feeling ill.  She was scheduled for revision but missed her appointment with Dr. Reynolds and had to be rescheduled.     Interval History:   NAEO. AFVSS.  Ambulating halls several times.  No nausea or emesis, tolerating diet.  Denies pain.  Cr stable at 1.5  CRP slightly lower but still elevated at 87.4.      Objective:     Temp:  [97.7 °F (36.5 °C)-98.7 °F (37.1 °C)] 98.2 °F (36.8 °C)  Pulse:  [72-88] 72  Resp:  [18-20] 18  SpO2:  [94 %-100 %] 96 %  BP: ()/(50-68) 107/68     Body mass index is 26.7 kg/m².    Date 02/18/24 0700 - 02/19/24 0659   Shift 4419-7010 4372-5023 0054-8277 24 Hour Total   INTAKE   Shift Total(mL/kg)       OUTPUT   Urine(mL/kg/hr) 200   200   Shift Total(mL/kg) 200(2.4)   200(2.4)   Weight (kg) 82 82 82 82          Drains       Drain  Duration                  Urostomy 02/06/24 1815 ureterostomy, left 11 days                     Physical Exam  Constitutional:       General: She is not in acute distress.     Appearance: Normal appearance.   HENT:      Head: Normocephalic and atraumatic.      Nose: Nose normal.   Eyes:      Conjunctiva/sclera:  "Conjunctivae normal.   Cardiovascular:      Rate and Rhythm: Normal rate.   Pulmonary:      Effort: Pulmonary effort is normal. No respiratory distress.   Abdominal:      Palpations: Abdomen is soft.      Comments: Midline Incision CDI, staples present  RUQ ileostomy wound with mild breakdown, no erythema or induration; no discharge or purulence.  Abdomen appropriately ttp. Soft, non-distended. No CVA tenderness bilaterally.  Urostomy in place, stent appears appropriately placed, urostomy with CYU output, minimal mucus.  LUQ ORESTES Site dressed without strikethrough   Musculoskeletal:         General: No deformity.   Skin:     General: Skin is warm and dry.   Neurological:      General: No focal deficit present.      Mental Status: She is alert.   Psychiatric:         Mood and Affect: Mood normal.         Behavior: Behavior normal.           Significant Labs:    BMP:  Recent Labs   Lab 02/17/24  0741 02/17/24  1916 02/18/24  0427    138 139   K 4.1 4.3 3.9    101 105   CO2 26 28 28   BUN 8 10 10   CREATININE 1.5* 1.5* 1.5*   CALCIUM 9.2 9.1 8.7       CBC:   Recent Labs   Lab 02/16/24  0230 02/17/24  0741 02/18/24  0427   WBC 7.93 8.63  8.63 7.68   HGB 7.9* 9.5*  9.5* 8.8*   HCT 26.9* 32.6*  32.6* 30.5*    441  441 435       Blood Culture: No results for input(s): "LABBLOO" in the last 168 hours.  Urine Culture: No results for input(s): "LABURIN" in the last 168 hours.  Urine Studies: No results for input(s): "COLORU", "APPEARANCEUA", "PHUR", "SPECGRAV", "PROTEINUA", "GLUCUA", "KETONESU", "BILIRUBINUA", "OCCULTUA", "NITRITE", "UROBILINOGEN", "LEUKOCYTESUR", "RBCUA", "WBCUA", "BACTERIA", "SQUAMEPITHEL", "HYALINECASTS" in the last 168 hours.    Invalid input(s): "WRIGHTSUR"    Significant Imaging:  All pertinent imaging results/findings from the past 24 hours have been reviewed.      Assessment/Plan:     Ileostomy in place  Edita Snowdenkarelydelicia is a 59 y.o. female with a history of cervical " cancer s/p XRT complicated by end-stage bladder and distal ureteral strictures. She underwent urinary diversion with a transverse colon conduit in 2020 complicated by a bowel perforation resulting in an ileostomy creation for GI diversion. She is now s/p surgical repair of her left ureterocolic anastomotic stricture and ileostomy reversal on 2/6/24.     - CRP today 87.4 from 92.1  - Cr 1.5, stable from yesterday  - Drank 3 pitchers yesterday  - Ambulating halls without restrictions  - Pain - MMPC  - Diet - continue regular diet  - No nausea or emesis for 48 hr  - OOB, ambulate 5x daily    - PT/OT  - Encourage IS  - DVT ppx: sqh   - daily labs  - restarting home meds as appropriate   - Replete lytes PRN  - Please include nursing notes for shift, patient may be unreliable historian to surgical team on morning rounds   - Patient was denied for home health due to insurance concerns  - Appreciate ostomy nurses' assistance  - Off antibiotics, appreciate ID's assistance    - Dispo: will plan for discharge today with daily bactrim  - will d/c PICC before discharge           VTE Risk Mitigation (From admission, onward)           Ordered     heparin (porcine) injection 5,000 Units  Every 8 hours         02/06/24 1725     Place ANANT hose  Until discontinued         02/06/24 0526     Place sequential compression device  Until discontinued         02/06/24 0526                    Dave Tatum MD  Urology  [facility]

## 2024-02-18 NOTE — ASSESSMENT & PLAN NOTE
Edita Riley is a 59 y.o. female with a history of cervical cancer s/p XRT complicated by end-stage bladder and distal ureteral strictures. She underwent urinary diversion with a transverse colon conduit in 2020 complicated by a bowel perforation resulting in an ileostomy creation for GI diversion. She is now s/p surgical repair of her left ureterocolic anastomotic stricture and ileostomy reversal on 2/6/24.     - CRP today 87.4 from 92.1  - Cr 1.5, stable from yesterday  - Drank 3 pitchers yesterday  - Ambulating halls without restrictions  - Pain - MMPC  - Diet - continue regular diet  - No nausea or emesis for 48 hr  - OOB, ambulate 5x daily    - PT/OT  - Encourage IS  - DVT ppx: sqh   - daily labs  - restarting home meds as appropriate   - Replete lytes PRN  - Please include nursing notes for shift, patient may be unreliable historian to surgical team on morning rounds   - Patient was denied for home health due to insurance concerns  - Appreciate ostomy nurses' assistance  - Off antibiotics, appreciate ID's assistance    - Dispo: will plan for discharge today with daily bactrim  - will d/c PICC before discharge

## 2024-02-18 NOTE — DISCHARGE INSTRUCTIONS
What to expect with urinary diversion and ureteral reimplant.  [facility]  Updated June 2023    After surgery  You may have a drain that is shaped like a grenade and put to suction. It will be removed over the next couple of days. It may have been removed prior to your discharge.   You will have stents that are in your ureters draining your kidneys. There are two kind of stents. Stents that either you can see coming out of the skin or stoma (opening of new urine channel at the skin) or stents that are inside the conduit (your new bladder). These will likely be removed at a later date as scheduled by your doctor.   You may also possibly have another drain or catheter from your pouch or neobladder.     NO ONE IS TO REMOVE THIS CATHETER OR CHANGE THE CATHETER OTHER THAN SOMEONE FROM [facility].  If you have to go to the ER because your catheter is not draining, come to the [facility] ER if possible and they will call the urology team if they are not able to irrigate your catheter.  If you live out of town and have to go to a local ER, DO NOT let that doctor remove your catheter. If they are unable to irrigate the catheter or are having troubles with it, have them page the [facility] resident on call immediately at [phone] to help answer any questions.  If you underwent a robotic or laparoscopic procedure, you will have several small incisions on your abdomen. If you underwent an open or traditional surgery, you will have a larger midline incision with stitches and skin glue on them. Do not pick at the skin glue, this should come off naturally with time in approximately 2 weeks time.  You may take a shower and allow the water to run over these incisions. You may pad dry with a towel, do not scrub the incisions.  DO NOT submerge your incisions in water. This includes bath tubs, hot tubs, pools, lakes, oceans and all other bodies of water.  If you have a drain at the time of  discharge, you should only sponge bath until removal. No showers or baths.    Over the next couple days after surgery we expect and hope that you will:  Walk - walking helps to prevent constipation, pneumonia and DVT/clots in your leg. After your surgery, you are at a risk for a deep venous thrombosis (which is a clot in the legs that can form by remaining inactive or still for extended periods of time) and this can travel to your lungs and make you feel short of breath. This is a very serious condition.  Eat - we will advance your diet slowly and you will have only sips until you start having active bowel sounds and passing gas. It is important to have good nutrition for healing and we will actively encourage you to take in meals with high protein.  Use your incentive spirometer (breathing tool) - this is the breathing apparatus that helps you expand your lungs. If and when you have pain you will not want to take deep breaths. But if you dont take deep breaths, you are at risk for pneumonia. The incentive spirometer will help prevent this from occurring by expanding your lungs. This should be done multiple times per hour.    Symptoms you may experience immediately post-op:  Bloating and/or shoulder pain with laparoscopic/robotic procedures - when we do this operation, we fill up your abdominal cavity with carbon dioxide gas (CO2) to help visualize the organs and allow our instruments to fit. After the surgery, not all the air can be removed and your body will eventually reabsorb this small amount of air. This process may make you feel bloated. In addition, when you sit up, the air can irritate your diaphragm muscle, which has connecting nerves to the shoulders, which could explain why you have shoulder pain.  Do not expect necessarily to have gas or to have a bowel movement - this goes along with the bloating, you may feel like you want to pass gas or have a bowel movement but you cant. This is normal and you  will feel like this for a couple days. There are no pills to help with this. Small walks throughout the day should help with this.  Pain - Immediately post op you may have a button for your pain control. In addition, you will have IV pain medications ordered. As your pain improves, you will transition to oral pain medications. Your pain should be able to be controlled with medicines by mouth that we prescribe. It is important for you to tell us if you are on any pain medications at home before surgery as you may need stronger pain meds while in the hospital.    You can go home when:  Pain is controlled with medicines by mouth  You are able to walk without difficulty or pain  You are tolerating a regular oral diet  Your catheters are draining freely  You have learned how to change your stoma pouch (if you have one)  You have learned how to irrigate your pouch (if you have one)  You are passing gas and having bowel movements    When you go home:  Catheter care  Blood in your urine (hematuria) is normal. However if you are passing a large amount of clots or your catheter stops draining and you are experiencing abdominal pain, go immediate to the ER. As discussed above, preferably [facility].  Activity  Continue to walk - small walks throughout the day are better than one long walk.   Do not lift anything greater than 8 pounds for 6 weeks - This will prevent abdominal hernias in your incisions. We want your abdominal wall muscles to heal.  Bowel Movements - Do not strain to have a bowel movement - narcotic pain meds will make you constipated. That is why we also ask you to take Miralax daily. If you are still having constipation, you may add colace 2-3 x per day. Do not take any stool softeners if you are having diarrhea. The goal is to have one soft bowel movement per day.  Drain - If you have a drain (not your catheter, but a separate grenade drain) then record the output amount and bring  it with you to your next appointment.  Smoking - If you smoke, we encourage you to STOP. Smoking interferes with the healing process and your delay your healing.  Driving - Do not drive while you are on pain meds or with your catheter in place.  Bathing - If you do not have a drain, you can shower 48 hours after your surgery. If you do have a drain, sponge bathe only until the drain is out.  Dressing - you can remove the dressing if there is no drainage or change them as needed if there is. The little sterile band-aid strips will fall off on their own in 10-14 days. If they have not fallen off then you can remove them yourself.  Restarting medicines -especially blood thinners (Aspirin,Plavix, Coumadin), Fish Oil, discuss this with your physician prior to discharge.    When to return to the ER  Fever - If you have a fever >101.5, this could be due to a number of reasons such as infection of the urine or incision. If your catheter has been removed, you could possibly have a leak. It would be best to come to the ER so they can better evaluate you.  Severe pain - pain is expected, but severe or new onset of pain is not normal.   Inability to tolerate food or liquid with nausea and vomiting - it would be best to go to the ER for them to better evaluate you.   Weakness    Before you underwent surgery you sign the consent, understanding the following risks:  Surgery consent  Bleeding - the prostate and its surrounding vessels are very vascular and bleeding can occur requiring a blood transfusion.  Erectile dysfunction (impotence) - you will not have erections after surgery immediately post-op.  Your potency depends on your age and if you were potent beforehand. Nerve-sparing also helps. However, it does not guarantee erectile function. There are options to discuss after surgery if you still remain impotent.  Blockage of ureters/anastomotic stricture (the drainage tube between the kidney and the bladder)- if this occurrs,  you will need another procedure to unblock the ureters.  Stricture of the urethra or Bladder Neck Contracture - any time the urethra is instrumented, there is a risk of having strictures and this would require another procedure to dilate the stricture. Bladder neck contracture can occur after any type of anastomotic procedure. You would present with difficulty voiding or emptying your bladder.  Damage to rectal wall, bowel, liver, spleen or any of the surrounding organs   Infection of urine or incision requiring further treatment  Pulmonary Emboli (blood clots) - this is why we ask you to walk around after surgery and the night of surgery to help prevent clots  Need for conversion to open procedure   Air embolus - to introduce air into the abdomen a needle is inserted into the abdomen, if this enters a vein or artery it can cause an embolus that travels to the heart and cause death.  Anastomotic leak - there is always a chance that the tissues have not healed when the catheter is removed and you may possibly have a leak. You may possibly present or complain of abdominal pain, weakness or fever. If you have these symptoms return to the ER. If you have a leak you will need a catheter to be replaced, likely under direct vision with a camera.   Small bowel obstruction (SBO) - after surgery, people can have adhesions that form from inflammation caused by surgery and this can cause a SBO, which would present with nausea and vomiting and unable to pass stool.  Ileus - your bowels can be in shock after surgery and you will present with nausea and vomiting. Sometimes this requires a tube that is placed through the nose and into your stomach until your bowels start working again, this is temporary.  Electrolyte abnormalities - resulting in confusion, weakness, abnormal heart rhythms  Wound dehiscence - requiring a wound vac and home health care  Stenosis of the stoma -requiring revision  Acute or chronic pain  Scars  Death,  paralysis from the neck or waist down, loss of limb  Blood consents  Risk of HIV and Hepatitis or any other blood borne disease  Risk of allergic reaction to blood treated symptomatically    If you had a surgery on your bladder to remove it, you will likely be sent home on injections or oral pill to reduce your chances of a blood clot. This will be a blood thinner.   You are encouraged to have a nutritious diet with ample protein, moderate carbs and fats.     You will follow up in 2 weeks with Emilie Villarreal for a wound check and staple removal.  You will follow up with Dr. Balbuena in approximately 4-5 weeks.  You will be seen by outpatient wound care, a referral order has been placed.

## 2024-02-18 NOTE — DISCHARGE SUMMARY
[facility]  Urology  Discharge Summary      Patient Name: Edita Riley  MRN: [id number]  Admission Date: 2/6/2024  Hospital Length of Stay: 12 days  Discharge Date and Time:  02/18/2024 9:11 AM  Attending Physician: Leslie Balbuena MD   Discharging Provider: Dave Tatum MD  Primary Care Physician: Trina Vu MD    HPI:   Edita Riley is a 58 y.o. female who presents with a history of radiation therapy for cervical cancer which was complicated by bilateral distal ureteral obstruction with bilateral hydronephrosis.  She is status post colon conduit which was performed in 2020.  This was complicated by large bowel perforation away from the anastamosis.  She has an ileostomy and a left percutaneous nephrostomy that was last exchanged on 10/26/2023.  She was admitted to the hospital on 12/7/2023 after her percutaneous nephrostomy tube was clogged and she had been feeling ill.  She was scheduled for revision but missed her appointment with Dr. Reynolds and had to be rescheduled.     Procedure(s) (LRB):  REVISION, ANASTOMOSIS, URETEROILEAL (N/A)  OPEN LAPAROTOMY, EXPLORATORY (N/A)  CLOSURE, ILEOSTOMY (N/A)  ANASTOMOSIS, INTESTINE - Ileocolic anastomosis (N/A)  EXCISION, SMALL INTESTINE  LYSIS, ADHESIONS  LYSIS, ADHESIONS     Indwelling Lines/Drains at time of discharge:   Lines/Drains/Airways       Peripherally Inserted Central Catheter Line  Duration             PICC Double Lumen 02/08/24 1155 right brachial 9 days              Drain  Duration                  Urostomy 02/06/24 1815 ureterostomy, left 11 days                    Hospital Course (synopsis of major diagnoses, care, treatment, and services provided during the course of the hospital stay): Patient [place] [facility] and [place] above procedure. Tolerated the procedure well and was transferred to the floor after recovery from anesthesia. Please see the operative note for further procedure details. Vitals are now  stable. Physical exam was appropriate for post operative state. The pelvic drain was removed 2 days prior to discharge. Her left nephrostomy tube was also removed. Diet was slowly advanced, and the patient was able to tolerate a regular diet prior to discharge. Patient was ambulating without issues. Patient was evaluated by PT/OT and they did recommend home health with PT/OT but patient was denied due to insurance.  Urostomy output was adequate. Patient is comfortable taking care of her ostomy at home. Patient was deemed suitable for discharge on 02/18/2024.    Patient will have a follow up in 2 weeks for stable removal and general check.      Goals of Care Treatment Preferences:  Code Status: Full Code      Consults:   Consults (From admission, onward)          Status Ordering Provider     Inpatient consult to Registered Dietitian/Nutritionist  Once        Provider:  (Not yet assigned)    Completed FE VILCHIS     [facility]  Once        Provider:  (Not yet assigned)    Completed FE VILCHIS     Inpatient consult to Social Work/Case Management  Once        Provider:  (Not yet assigned)    Acknowledged DAVIS PEREZ     Inpatient consult to Infectious Diseases  Once        Provider:  (Not yet assigned)    Completed DAVIS PEREZ            Significant Diagnostic Studies: As per notes.    Pending Diagnostic Studies:       Procedure Component Value Units Date/Time    C-Reactive Protein [[id number]] Collected: 02/13/24 0429    Order Status: Sent Lab Status: In process Updated: 02/13/24 0442    Specimen: Blood             Final Active Diagnoses:    Diagnosis Date Noted POA    PRINCIPAL PROBLEM:  Hydronephrosis of left kidney [N13.30] 06/11/2020 Yes    Difficult intravenous access [Z78.9] 01/29/2024 Yes    History of stroke [Z86.73] 01/29/2024 Not Applicable    History of suicidal ideation [Z86.59] 09/10/2023 Not Applicable    Arthritis [M19.90] 04/10/2023 Yes    Refusal of blood  transfusions as patient is Judaism [Z53.1] 04/10/2023 Not Applicable    Migraine without aura and without status migrainosus, not intractable [G43.009] 02/17/2023 Yes    UTI (urinary tract infection) [N39.0] 02/16/2023 Yes    CKD (chronic kidney disease), stage III [N18.30] 02/14/2023 Yes    Nephrostomy status [Z93.6]  Not Applicable     Chronic    Hard to intubate [T88.4XXA] 04/20/2021 Yes    Presence of urostomy [Z93.6] 02/08/2021 Not Applicable    History of DVT (deep vein thrombosis) [Z86.718] 02/03/2021 Not Applicable     Chronic    Ileostomy in place [Z93.2]  Not Applicable     Chronic    Moderate malnutrition [E44.0] 09/20/2020 Yes     Chronic    Radiation cystitis [N30.40] 09/04/2020 Yes     Chronic    Anemia in chronic kidney disease [N18.9, D63.1] 05/18/2020 Yes    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.0, T45.1X5A] 11/14/2018 Yes    PTSD (post-traumatic stress disorder) [F43.10] 10/17/2018 Yes     Chronic    Metastatic squamous cell carcinoma to lymph node [C77.9] 10/11/2018 Yes     Chronic    Gastro-esophageal reflux disease with esophagitis [K21.00] 11/16/2017 Yes    Carpal tunnel syndrome on right [G56.01] 07/20/2017 Yes    Severe episode of recurrent major depressive disorder, with psychotic features [F33.3] 09/16/2015 Yes     Chronic    Fibromyalgia [M79.7] 09/16/2015 Yes     Chronic    Internal hemorrhoids [K64.8] 09/14/2015 Yes    Weakness of both lower extremities [R29.898] 07/24/2015 Yes    Cervical cancer [C53.9] 05/04/2015 Yes    Osteoarthritis of back [M47.9] 05/04/2015 Yes     Chronic    Essential hypertension [I10] 05/04/2015 Yes      Problems Resolved During this Admission:         Discharged Condition: good    Disposition: Home or Self Care    Follow Up:   Follow-up Information       Emilie Villarreal NP Follow up in 2 week(s).    Specialties: Urology, Family Medicine  Why: Post-Op Follow-Up and staple removal  Contact information:  [address]  [place]  [address]  [phone]               Leslie Balbuena MD Follow up in 4 week(s).    Specialty: Urology  Why: Post-Op Follow-Up  Contact information:  [address]  [facility] [address]  [phone]                           Patient Instructions:      Ambulatory referral/consult to [facility]   Standing Status: Future   Referral Priority: Routine Referral Type: Consultation   Referral Reason: Specialty Services Required   Number of Visits Requested: 1     Ambulatory referral/consult to Wound Clinic   Standing Status: Future   Referral Priority: Routine Referral Type: Consultation   Referral Reason: Specialty Services Required   Requested Specialty: Wound Care   Number of Visits Requested: 1     Notify your health care provider if you experience any of the following:  temperature >100.4     Notify your health care provider if you experience any of the following:  persistent nausea and vomiting or diarrhea     Notify your health care provider if you experience any of the following:  severe uncontrolled pain     Notify your health care provider if you experience any of the following:  redness, tenderness, or signs of infection (pain, swelling, redness, odor or green/yellow discharge around incision site)     Notify your health care provider if you experience any of the following:  worsening rash     Notify your health care provider if you experience any of the following:  persistent dizziness, light-headedness, or visual disturbances     Medications:  Reconciled Home Medications:      Medication List        START taking these medications      acetaminophen 500 MG tablet  Commonly known as: TYLENOL  Take 1 tablet (500 mg total) by mouth every 6 (six) hours as needed for Pain.     sulfamethoxazole-trimethoprim 400-80mg 400-80 mg per tablet  Commonly known as: BACTRIM,SEPTRA  Take 1 tablet by mouth once daily. for 14 doses            CONTINUE taking these medications      ferrous  sulfate 325 mg (65 mg iron) Tab tablet  Commonly known as: FEOSOL  Take 1 tablet (325 mg total) by mouth 2 (two) times daily.     loratadine 10 mg tablet  Commonly known as: CLARITIN  Take 10 mg by mouth daily as needed for Allergies.     mirtazapine 30 MG tablet  Commonly known as: REMERON  Take 1 tablet (30 mg total) by mouth nightly.     ondansetron 4 MG Tbdl  Commonly known as: ZOFRAN-ODT  Dissolve 1 tablet (4 mg total) by mouth every 8 (eight) hours as needed (nausea).     sodium bicarbonate 650 MG tablet  Take 2 tablets (1,300 mg total) by mouth 2 (two) times daily.              Time spent on the discharge of patient: 15 minutes    Dave Tatum MD  Urology  [facility]

## 2024-02-18 NOTE — PROGRESS NOTES
[facility]  Colorectal Surgery  Progress Note    Patient Name: Edita Riley  MRN: [id number]  Admission Date: 2/6/2024  Hospital Length of Stay: 12 days  Attending Physician: Leslie Balbuena MD    Subjective:     Interval History: NAEON. Pt overall feeling well. Tolerating diet. Denies N/V. Admits flatus. Reports pain is well controlled. OOB as tolerated.     Post-Op Info:  Procedure(s) (LRB):  REVISION, ANASTOMOSIS, URETEROILEAL (N/A)  OPEN LAPAROTOMY, EXPLORATORY (N/A)  CLOSURE, ILEOSTOMY (N/A)  ANASTOMOSIS, INTESTINE - Ileocolic anastomosis (N/A)  EXCISION, SMALL INTESTINE  LYSIS, ADHESIONS  LYSIS, ADHESIONS   12 Days Post-Op      Medications:  Continuous Infusions:  Scheduled Meds:   acetaminophen  1,000 mg Oral Q8H    heparin (porcine)  5,000 Units Subcutaneous Q8H    sodium chloride 0.9%  10 mL Intravenous Q6H     PRN Meds:   ibuprofen    iohexol    LIDOcaine HCL 10 mg/ml (1%)    melatonin    ondansetron    oxyCODONE    prochlorperazine    sodium chloride 0.9%    sodium chloride 0.9%        Objective:     Vital Signs (Most Recent):  Temp: 98.1 °F (36.7 °C) (02/18/24 0854)  Pulse: 84 (02/18/24 0854)  Resp: 18 (02/18/24 0854)  BP: (!) 109/55 (02/18/24 0854)  SpO2: 95 % (02/18/24 0854) Vital Signs (24h Range):  Temp:  [97.7 °F (36.5 °C)-98.7 °F (37.1 °C)] 98.1 °F (36.7 °C)  Pulse:  [72-88] 84  Resp:  [18-20] 18  SpO2:  [94 %-100 %] 95 %  BP: ()/(50-68) 109/55     Intake/Output - Last 3 Shifts         02/16 0700  02/17 0659 02/17 0700  02/18 0659 02/18 0700  02/19 0659    P.O.  2100     IV Piggyback  999     Total Intake(mL/kg)  3099 (37.8)     Urine (mL/kg/hr)  2650 (1.3) 200 (1.2)    Emesis/NG output  0 0    Other  0 0    Stool  0 0    Total Output  2650 200    Net  +449 -200           Stool Occurrence 1 x 1 x 0 x    Emesis Occurrence  0 x 0 x             Physical Exam     Constitutional:       General: She is not in acute distress.  Cardiovascular:      Rate and Rhythm: Normal rate.       Pulses: Normal pulses.   Pulmonary:      Effort: Pulmonary effort is normal. No respiratory distress.   Abdominal:      General: There is no distension. Inicisons c/d/I, drain minimal out, urostomy functional, preivous ileostomy site with scan drainage     Palpations: Abdomen is soft.      Tenderness:Minimally tender  Neurological:      General: No focal deficit present.      Mental Status: She is alert and oriented to person, place, and time.    Significant Labs:  All pertinent lab results within the last 24 hours have been reviewed.     Significant Diagnostics:  I have reviewed all pertinent imaging results/findings within the past 24 hours.  Assessment/Plan:     Cervical cancer  Edita Riley is a 59 y.o. year old female with history of ureteral stenosis after radiation for cervical cancer requiring urostomy (transverse colon conduit) complicated by anastomotic leak requiring right colectomy and end ileostomy now s/p ex lap, extensive maliha, revision of urostomy, small bowel resection, ileostomy reversal w/ ileosigmoid anastomosis on 2/6. Clinically improved with return of bowel function. CT on 2/16 was wnl. Cr stable, CRP improving. Stable for discharge.     Plan:  Tolerating solids  Having bowel function  Ambulate, IS       Ok for DC from CRS standpoint  Rest per primary      Juan José Cottrell MD  Colorectal Surgery  [facility]

## 2024-02-20 ENCOUNTER — OFFICE VISIT (OUTPATIENT)
Dept: INFECTIOUS DISEASES | Facility: CLINIC | Age: 59
End: 2024-02-20
Payer: MEDICAID

## 2024-02-20 VITALS
WEIGHT: 188.5 LBS | TEMPERATURE: 98 F | HEART RATE: 114 BPM | DIASTOLIC BLOOD PRESSURE: 64 MMHG | SYSTOLIC BLOOD PRESSURE: 111 MMHG | BODY MASS INDEX: 27.92 KG/M2 | HEIGHT: 69 IN

## 2024-02-20 DIAGNOSIS — R82.71 BACTERIURIA: Primary | ICD-10-CM

## 2024-02-20 PROCEDURE — 3078F DIAST BP <80 MM HG: CPT | Mod: CPTII,,, | Performed by: REGISTERED NURSE

## 2024-02-20 PROCEDURE — 99999 PR PBB SHADOW E&M-EST. PATIENT-LVL III: CPT | Mod: PBBFAC,,, | Performed by: REGISTERED NURSE

## 2024-02-20 PROCEDURE — 1111F DSCHRG MED/CURRENT MED MERGE: CPT | Mod: CPTII,,, | Performed by: REGISTERED NURSE

## 2024-02-20 PROCEDURE — 3008F BODY MASS INDEX DOCD: CPT | Mod: CPTII,,, | Performed by: REGISTERED NURSE

## 2024-02-20 PROCEDURE — 3074F SYST BP LT 130 MM HG: CPT | Mod: CPTII,,, | Performed by: REGISTERED NURSE

## 2024-02-20 PROCEDURE — 99213 OFFICE O/P EST LOW 20 MIN: CPT | Mod: S$PBB,,, | Performed by: REGISTERED NURSE

## 2024-02-20 PROCEDURE — 99213 OFFICE O/P EST LOW 20 MIN: CPT | Mod: PBBFAC | Performed by: REGISTERED NURSE

## 2024-02-20 NOTE — PROGRESS NOTES
Subjective     Patient ID: Edita Riley is a 59 y.o. female.    Chief Complaint: Follow-up    HPI    Ms Riley is a 60 yo female with PMH of cervical CA s/p chemotherapy and XRT c/b bilateral ureteral strictures requiring bilateral nephrostomy tubes (now only with left tube; right removed 4/2023), s/p urinary to colon conduit 2020 complicated by an anastomotic leak from colo-colonic anastomosis at harvest site for the conduit, necessitating right colectomy/end ileostomy, recurrent UTIs who was recently admitted for planned surgery. On 2/6 she underwent surgical repair of her left ureterocolic anastomotic stricture and ileostomy reversal with urology and CRS.  Urine culture is positive for Bacillus. she received ertapenem while inpatient, though was dc'ed on 2/15 per ID staff.  Received about 9 days.      Today for hospital follow up. Ertapenem dc'ed while inpatient. Urology started bactrim. Remains with left lower quadrant urostomy pouch. Denies bladder pressure, foul odor. Denies fever, chills. Reports intermittent abdominal pain but improving. Reports decreased appetite, reports related to recent surgery. Was discharged on Sunday. Has urology follow up in March. Overall doing well.     Review of Systems   Constitutional:  Negative for chills, diaphoresis, fatigue and fever.   HENT: Negative.     Eyes: Negative.    Respiratory:  Negative for cough and shortness of breath.    Cardiovascular:  Negative for leg swelling.   Gastrointestinal:  Negative for constipation, diarrhea and vomiting.   Genitourinary:  Negative for decreased urine volume and flank pain.   Musculoskeletal:  Negative for arthralgias and myalgias.   Neurological:  Negative for dizziness.   Psychiatric/Behavioral:  Negative for agitation and confusion.           Objective     Physical Exam  Vitals reviewed.   Constitutional:       General: She is not in acute distress.     Appearance: Normal appearance. She is not ill-appearing.    HENT:      Nose: Nose normal.      Mouth/Throat:      Mouth: Mucous membranes are moist.      Pharynx: Oropharynx is clear.   Eyes:      General:         Right eye: No discharge.         Left eye: No discharge.      Conjunctiva/sclera: Conjunctivae normal.   Cardiovascular:      Rate and Rhythm: Normal rate and regular rhythm.      Pulses: Normal pulses.      Heart sounds: Normal heart sounds. No murmur heard.  Pulmonary:      Effort: Pulmonary effort is normal. No respiratory distress.      Breath sounds: Normal breath sounds.   Abdominal:      General: Abdomen is flat. There is no distension.      Palpations: Abdomen is soft.   Musculoskeletal:         General: Normal range of motion.      Cervical back: Normal range of motion.      Right lower leg: No edema.      Left lower leg: No edema.   Skin:     General: Skin is warm.      Findings: No erythema or rash.   Neurological:      General: No focal deficit present.      Mental Status: She is alert and oriented to person, place, and time.      Motor: No weakness.      Gait: Gait normal.   Psychiatric:         Mood and Affect: Mood normal.         Behavior: Behavior normal.            Assessment and Plan     1. Bacteriuria      Pt currently on bactrim per urology recommendation. Reached out to urology team and requesting to continue. Okay to continue per urology recs. Encouraged pt to follow up with urology.     Can follow up with ID as needed           20 minutes of total time was spent on this encounter, which includes face to face time and non-face to face time preparing to see the patient (eg, review of tests), Obtaining and/or reviewing separately obtained history, documenting clinical information in the electronic or other health record, independently interpreting results (not separately reported) and communicating results to the patient/family/caregiver, or care coordination (not separately reported).

## 2024-02-25 ENCOUNTER — HOSPITAL ENCOUNTER (EMERGENCY)
Facility: HOSPITAL | Age: 59
Discharge: HOME | End: 2024-02-25
Attending: EMERGENCY MEDICINE
Payer: MEDICAID

## 2024-02-25 VITALS
WEIGHT: 185 LBS | TEMPERATURE: 98 F | HEART RATE: 76 BPM | OXYGEN SATURATION: 100 % | BODY MASS INDEX: 27.4 KG/M2 | HEIGHT: 69 IN | RESPIRATION RATE: 15 BRPM | DIASTOLIC BLOOD PRESSURE: 67 MMHG | SYSTOLIC BLOOD PRESSURE: 144 MMHG

## 2024-02-25 DIAGNOSIS — N13.30 HYDRONEPHROSIS OF LEFT KIDNEY: ICD-10-CM

## 2024-02-25 DIAGNOSIS — C53.9 MALIGNANT NEOPLASM OF CERVIX, UNSPECIFIED SITE: ICD-10-CM

## 2024-02-25 DIAGNOSIS — Z43.6 ATTENTION TO UROSTOMY: Primary | ICD-10-CM

## 2024-02-25 LAB
ALBUMIN SERPL BCP-MCNC: 3.1 G/DL (ref 3.5–5.2)
ALP SERPL-CCNC: 118 U/L (ref 55–135)
ALT SERPL W/O P-5'-P-CCNC: 30 U/L (ref 10–44)
ANION GAP SERPL CALC-SCNC: 8 MMOL/L (ref 8–16)
AST SERPL-CCNC: 51 U/L (ref 10–40)
BASOPHILS # BLD AUTO: 0.07 K/UL (ref 0–0.2)
BASOPHILS NFR BLD: 1 % (ref 0–1.9)
BILIRUB SERPL-MCNC: 0.2 MG/DL (ref 0.1–1)
BUN SERPL-MCNC: 12 MG/DL (ref 8–23)
CALCIUM SERPL-MCNC: 9.4 MG/DL (ref 8.7–10.5)
CHLORIDE SERPL-SCNC: 110 MMOL/L (ref 95–110)
CO2 SERPL-SCNC: 23 MMOL/L (ref 23–29)
CREAT SERPL-MCNC: 1.5 MG/DL (ref 0.5–1.4)
DIFFERENTIAL METHOD BLD: ABNORMAL
EOSINOPHIL # BLD AUTO: 0.7 K/UL (ref 0–0.5)
EOSINOPHIL NFR BLD: 9.4 % (ref 0–8)
ERYTHROCYTE [DISTWIDTH] IN BLOOD BY AUTOMATED COUNT: 15.3 % (ref 11.5–14.5)
EST. GFR  (NO RACE VARIABLE): 39.4 ML/MIN/1.73 M^2
GLUCOSE SERPL-MCNC: 94 MG/DL (ref 70–110)
HCT VFR BLD AUTO: 33.6 % (ref 37–48.5)
HCV AB SERPL QL IA: NORMAL
HGB BLD-MCNC: 9.9 G/DL (ref 12–16)
HIV 1+2 AB+HIV1 P24 AG SERPL QL IA: NORMAL
IMM GRANULOCYTES # BLD AUTO: 0.03 K/UL (ref 0–0.04)
IMM GRANULOCYTES NFR BLD AUTO: 0.4 % (ref 0–0.5)
INR PPP: 1 (ref 0.8–1.2)
LYMPHOCYTES # BLD AUTO: 1.8 K/UL (ref 1–4.8)
LYMPHOCYTES NFR BLD: 25.1 % (ref 18–48)
MAGNESIUM SERPL-MCNC: 1.9 MG/DL (ref 1.6–2.6)
MCH RBC QN AUTO: 27.9 PG (ref 27–31)
MCHC RBC AUTO-ENTMCNC: 29.5 G/DL (ref 32–36)
MCV RBC AUTO: 95 FL (ref 82–98)
MONOCYTES # BLD AUTO: 0.9 K/UL (ref 0.3–1)
MONOCYTES NFR BLD: 13.2 % (ref 4–15)
NEUTROPHILS # BLD AUTO: 3.6 K/UL (ref 1.8–7.7)
NEUTROPHILS NFR BLD: 50.9 % (ref 38–73)
NRBC BLD-RTO: 0 /100 WBC
PLATELET # BLD AUTO: 559 K/UL (ref 150–450)
PMV BLD AUTO: 9.5 FL (ref 9.2–12.9)
POTASSIUM SERPL-SCNC: 4.2 MMOL/L (ref 3.5–5.1)
PROT SERPL-MCNC: 7.9 G/DL (ref 6–8.4)
PROTHROMBIN TIME: 10.3 SEC (ref 9–12.5)
RBC # BLD AUTO: 3.55 M/UL (ref 4–5.4)
SODIUM SERPL-SCNC: 141 MMOL/L (ref 136–145)
WBC # BLD AUTO: 7.04 K/UL (ref 3.9–12.7)

## 2024-02-25 PROCEDURE — 86803 HEPATITIS C AB TEST: CPT | Performed by: PHYSICIAN ASSISTANT

## 2024-02-25 PROCEDURE — 83735 ASSAY OF MAGNESIUM: CPT

## 2024-02-25 PROCEDURE — 63600175 PHARM REV CODE 636 W HCPCS

## 2024-02-25 PROCEDURE — 93010 ELECTROCARDIOGRAM REPORT: CPT | Mod: ,,, | Performed by: INTERNAL MEDICINE

## 2024-02-25 PROCEDURE — 85610 PROTHROMBIN TIME: CPT

## 2024-02-25 PROCEDURE — 87389 HIV-1 AG W/HIV-1&-2 AB AG IA: CPT | Performed by: PHYSICIAN ASSISTANT

## 2024-02-25 PROCEDURE — 25000003 PHARM REV CODE 250

## 2024-02-25 PROCEDURE — 99284 EMERGENCY DEPT VISIT MOD MDM: CPT | Mod: 25

## 2024-02-25 PROCEDURE — 85025 COMPLETE CBC W/AUTO DIFF WBC: CPT

## 2024-02-25 PROCEDURE — 96365 THER/PROPH/DIAG IV INF INIT: CPT

## 2024-02-25 PROCEDURE — 80053 COMPREHEN METABOLIC PANEL: CPT

## 2024-02-25 PROCEDURE — 93005 ELECTROCARDIOGRAM TRACING: CPT

## 2024-02-25 RX ADMIN — GENTAMICIN SULFATE 330 MG: 40 INJECTION, SOLUTION INTRAMUSCULAR; INTRAVENOUS at 07:02

## 2024-02-25 NOTE — SUBJECTIVE & OBJECTIVE
Past Medical History:   Diagnosis Date    Abnormal mammogram 08/25/2020    Abnormal mammogram 08/25/2020    Acute blood loss anemia     Acute deep vein thrombosis (DVT) of lower extremity 12/09/2020    Advance care planning 04/30/2021    ODESSA (acute kidney injury) 03/21/2020    Anemia due to chronic blood loss     Anemia due to chronic kidney disease     Anemia due to chronic kidney disease 05/18/2020    Anxiety     Bilateral ureteral obstruction 09/11/2020    Bilious vomiting with nausea 06/11/2023    Cardiovascular event risk -- low 09/14/2015    ASCVD 10-year risk 1.9% (with optimal risk factors 1.3%) as of 9/14/2015     Cervical cancer 2014    Chronic back pain     Colostomy care     Deep vein thrombosis     Depression     Diarrhea due to malabsorption 11/14/2018    Difficult intubation     Discharge planning issues 04/30/2021    Disorder of kidney and ureter     DVT of lower extremity, bilateral 11/04/2020    Edema 04/10/2023    Emphysema of lung 04/10/2023    Fibromyalgia     Fungemia 09/27/2020    Generalized abdominal pain 08/25/2020    GERD (gastroesophageal reflux disease)     Hemifacial spasm 09/16/2015    Hiatal hernia 2014    History of cervical cancer 10/11/2018    History of essential hypertension 05/04/2015    Not requiring medications at this time  BP is low- concern that this may correlate with increased stool output from stoma. Encourage her to contact GI and her PCP.    Hx of psychiatric care     Cymbalta, trazodone    Hypertension     Hypomagnesemia 11/21/2018    Hyponatremia 09/10/2023    Impaired functional mobility, balance, gait, and endurance 07/24/2015    Lactose intolerance     Major depressive disorder, recurrent episode, moderate 06/02/2023    Metastatic squamous cell carcinoma to lymph node 10/11/2018    Moderate episode of recurrent major depressive disorder 04/21/2021    Nephrostomy complication 10/26/2023    Neuropathy due to chemotherapeutic drug 11/14/2018    Osteoarthritis of back      Peritonitis 09/22/2020    Physical deconditioning 04/30/2021    Pseudomonas urinary tract infection 04/21/2021    Psychiatric problem     Pyelitis 09/09/2023    QT prolongation 04/16/2021    Refusal of blood transfusions as patient is Jainism     Schatzki's ring 09/14/2015    Seen on outside EGD 05/2014, underwent esophageal dilatation. Bx were negative.     Seizure-like activity 12/02/2018    Seizures     Sleep stage dysfunction     Noted on PSG 06/2017; negative for obstructive sleep apnea     Stroke     Urinary tract infection associated with nephrostomy catheter 06/11/2020    Wound infection after surgery 09/24/2020       Past Surgical History:   Procedure Laterality Date    ANASTOMOSIS OF INTESTINE N/A 2/6/2024    Procedure: ANASTOMOSIS, INTESTINE - Ileocolic anastomosis;  Surgeon: Hammad Reynolds MD;  Location: [facility] OR 2ND FLR;  Service: Colon and Rectal;  Laterality: N/A;    ANTEGRADE NEPHROSTOGRAPHY Bilateral 9/28/2020    Procedure: Nephrostogram - antegrade;  Surgeon: Leslie Balbuena MD;  Location: [facility] OR 1ST FLR;  Service: Urology;  Laterality: Bilateral;    ANTEGRADE NEPHROSTOGRAPHY Left 4/20/2021    Procedure: NEPHROSTOGRAM, ANTEGRADE;  Surgeon: Leslie Balbuena MD;  Location: [facility] OR 1ST FLR;  Service: Urology;  Laterality: Left;    ANTEGRADE NEPHROSTOGRAPHY Right 10/27/2022    Procedure: NEPHROSTOGRAM, ANTEGRADE;  Surgeon: Chirag Russ MD;  [facility];  Service: Urology;  Laterality: Right;    ANTEGRADE NEPHROSTOGRAPHY Right 4/21/2023    Procedure: NEPHROSTOGRAM, ANTEGRADE;  Surgeon: Chirag Russ MD;  Location: [facility] OR 2ND FLR;  Service: Urology;  Laterality: Right;    ANTEGRADE NEPHROSTOGRAPHY Left 6/6/2023    Procedure: Nephrostogram - antegrade;  Surgeon: Leslie Balbuena MD;  Location: [facility] OR 1ST FLR;  Service: Urology;  Laterality: Left;    BILATERAL OOPHORECTOMY  2015    CHOLECYSTECTOMY  11/09/2016    Done at [facility], path showed chronic  cholecystitis and gallstones    cold knife conization  2014    COLECTOMY, RIGHT  9/17/2020    Procedure: COLECTOMY, RIGHT Extended;  Surgeon: Hammad Reynolds MD;  Location: [facility] OR 2ND FLR;  Service: Colon and Rectal;;    COLONOSCOPY  2014    COLONOSCOPY N/A 10/24/2016    at [facility], Dr Gutiérrez    COLONOSCOPY N/A 5/18/2018    Procedure: COLONOSCOPY;  Surgeon: Arden Gutiérrez MD;  Location: [facility] (4TH FLR);  Service: Endoscopy;  Laterality: N/A;    COLONOSCOPY N/A 7/28/2020    Procedure: COLONOSCOPY;  Surgeon: Hammad Reynolds MD;  Location: [facility] (4TH FLR);  Service: Colon and Rectal;  Laterality: N/A;  covid test [place] 7/25    CYSTOSCOPY WITH URETEROSCOPY, RETROGRADE PYELOGRAPHY, AND INSERTION OF STENT Bilateral 3/21/2020    Procedure: CYSTOSCOPY, WITH RETROGRADE PYELOGRAM,;  Surgeon: Leslie Balbuena MD;  Location: [facility] OR 1ST FLR;  Service: Urology;  Laterality: Bilateral;    DILATION OF NEPHROSTOMY TRACT Right 10/27/2022    Procedure: DILATION, NEPHROSTOMY TRACT;  Surgeon: Chirag Russ MD;  Location: [facility] OR 1ST FLR;  Service: Urology;  Laterality: Right;    ESOPHAGOGASTRODUODENOSCOPY  2014    ESOPHAGOGASTRODUODENOSCOPY  11/18/2020    ESOPHAGOGASTRODUODENOSCOPY N/A 11/18/2020    Procedure: ESOPHAGOGASTRODUODENOSCOPY (EGD);  Surgeon: Zenon Spencer MD;  Location: [facility];  Service: Endoscopy;  Laterality: N/A;    ESOPHAGOGASTRODUODENOSCOPY N/A 12/11/2020    Procedure: EGD (ESOPHAGOGASTRODUODENOSCOPY);  Surgeon: Juancho Muse MD;  Location: [facility] (2ND FLR);  Service: Endoscopy;  Laterality: N/A;    EXCISION, SMALL INTESTINE  2/6/2024    Procedure: EXCISION, SMALL INTESTINE;  Surgeon: Hammad Reynolds MD;  Location: [facility] OR 2ND FLR;  Service: Colon and Rectal;;    HYSTERECTOMY  2014    [facility] for cervical cancer    ILEOSTOMY  9/17/2020    Procedure: CREATION, ILEOSTOMY;  Surgeon: Hammad Reynolds MD;  Location: [facility] OR 2ND FLR;  Service: Colon and Rectal;;    ILEOSTOMY CLOSURE N/A 2/6/2024     Procedure: CLOSURE, ILEOSTOMY;  Surgeon: Hammad Reynolds MD;  [facility] 2ND FLR;  Service: Colon and Rectal;  Laterality: N/A;    LAPAROTOMY, EXPLORATORY N/A 2/6/2024    Procedure: OPEN LAPAROTOMY, EXPLORATORY;  Surgeon: Leslie Balbuena MD;  Location: [facility] OR 2ND FLR;  Service: Urology;  Laterality: N/A;  22 modifier    LOOPOGRAM N/A 4/20/2021    Procedure: LOOPOGRAM;  Surgeon: Leslie Balbuena MD;  Location: [facility] OR 1ST FLR;  Service: Urology;  Laterality: N/A;    LOOPOGRAM N/A 6/6/2023    Procedure: LOOPOGRAM;  Surgeon: Leslie Balbuena MD;  Location: [facility] OR 1ST FLR;  Service: Urology;  Laterality: N/A;    LYSIS OF ADHESIONS  2/6/2024    Procedure: LYSIS, ADHESIONS;  Surgeon: Leslie Balbuena MD;  Location: [facility] OR 2ND FLR;  Service: Urology;;    LYSIS OF ADHESIONS  2/6/2024    Procedure: LYSIS, ADHESIONS;  Surgeon: Hammad Reynolds MD;  Location: [facility] OR 2ND FLR;  Service: Colon and Rectal;;    MOBILIZATION OF SPLENIC FLEXURE N/A 9/11/2020    Procedure: MOBILIZATION, COLONIC;  Surgeon: Hammad Reynolds MD;  Location: [facility] OR 2ND FLR;  Service: Colon and Rectal;  Laterality: N/A;    NEPHROSTOGRAPHY Bilateral 4/17/2021    Procedure: Nephrostogram;  Surgeon: Celeste Surgeon;  Location: [facility];  Service: Anesthesiology;  Laterality: Bilateral;    OOPHORECTOMY      PERCUTANEOUS NEPHROLITHOTOMY Right 4/21/2023    Procedure: NEPHROLITHOTOMY, PERCUTANEOUS;  Surgeon: Chirag Russ MD;  Location: [facility] OR 2ND FLR;  Service: Urology;  Laterality: Right;  2.5 hrs    PERCUTANEOUS NEPHROSTOMY  4/21/2023    Procedure: CREATION, NEPHROSTOMY, PERCUTANEOUS with removal of existing nephrostomy tube;  Surgeon: hCirag Russ MD;  Location: [facility] OR 2ND FLR;  Service: Urology;;    PYELOSCOPY Right 10/27/2022    Procedure: PYELOSCOPY;  Surgeon: Chirag Russ MD;  Location: [facility] OR 1ST FLR;  Service: Urology;  Laterality: Right;    REPLACEMENT OF NEPHROSTOMY TUBE Right 10/27/2022    Procedure: REPLACEMENT,  NEPHROSTOMY TUBE;  Surgeon: Chirag Russ MD;  Location: [facility] OR 1ST FLR;  Service: Urology;  Laterality: Right;    RETROPERITONEAL LYMPHADENECTOMY Right 9/17/2018    Procedure: LYMPHADENECTOMY, RETROPERITONEUM;  Surgeon: Sebas Reed MD;  Location: [facility] OR 2ND FLR;  Service: General;  Laterality: Right;  ILIAC    REVISION OF ANASTOMOSIS OF URETER TO ILEAL CONDUIT N/A 2/6/2024    Procedure: REVISION, ANASTOMOSIS, URETEROILEAL;  Surgeon: Leslie Balbuena MD;  Location: [facility] OR 2ND FLR;  Service: Urology;  Laterality: N/A;    URETEROSCOPIC REMOVAL OF URETERIC CALCULUS Right 10/27/2022    Procedure: REMOVAL, CALCULUS, URETER, URETEROSCOPIC;  Surgeon: Chirag Russ MD;  [facility];  Service: Urology;  Laterality: Right;    URETEROSCOPY Right 10/27/2022    Procedure: URETEROSCOPY-ANTEGRADE;  Surgeon: Chirag Russ MD;  [facility];  Service: Urology;  Laterality: Right;       Review of patient's allergies indicates:   Allergen Reactions    Bee sting [allergen ext-venom-honey bee]      Rash      Grass pollen-bermuda, standard      rash       Family History       Problem Relation (Age of Onset)    Breast cancer Maternal Aunt    Cancer Father, Mother    Cervical cancer Mother    Colon cancer Father    Drug abuse Daughter, Daughter    Esophageal cancer Father    Heart disease Sister, Maternal Grandmother    Suicide Daughter            Tobacco Use    Smoking status: Never    Smokeless tobacco: Never   Substance and Sexual Activity    Alcohol use: No     Alcohol/week: 0.0 standard drinks of alcohol    Drug use: No    Sexual activity: Yes     Partners: Male     Birth control/protection: None     Comment:  19 years since 1999       Review of Systems  See HPI  Objective:     Temp:  [97.9 °F (36.6 °C)] 97.9 °F (36.6 °C)  Pulse:  [85-96] 85  Resp:  [14-17] 14  SpO2:  [99 %-100 %] 100 %  BP: (122-138)/(60-87) 138/87  Weight: 83.9 kg (185 lb)  Body mass index is 27.32  "kg/m².           Drains       Drain  Duration                  Urostomy 02/06/24 1815 ureterostomy, left 18 days                     Physical Exam  Constitutional:       General: She is not in acute distress.     Appearance: Normal appearance. She is not ill-appearing.   HENT:      Head: Normocephalic and atraumatic.      Mouth/Throat:      Mouth: Mucous membranes are moist.   Eyes:      Extraocular Movements: Extraocular movements intact.   Cardiovascular:      Rate and Rhythm: Normal rate.   Pulmonary:      Effort: Pulmonary effort is normal.   Abdominal:      General: There is no distension.      Palpations: Abdomen is soft.      Tenderness: There is no abdominal tenderness.      Comments: Midline incision CDI with staples in place  RUQ ORESTES drain site CDI   Genitourinary:     Comments: LLQ ostomy with clear yellow urine output  Skin:     General: Skin is warm and dry.   Neurological:      Mental Status: She is alert and oriented to person, place, and time.          Significant Labs:    BMP:  Recent Labs   Lab 02/25/24  1653      K 4.2      CO2 23   BUN 12   CREATININE 1.5*   CALCIUM 9.4       CBC:  Recent Labs   Lab 02/25/24  1653   WBC 7.04   HGB 9.9*   HCT 33.6*   *       Urine Studies: No results for input(s): "COLORU", "APPEARANCEUA", "PHUR", "SPECGRAV", "PROTEINUA", "GLUCUA", "KETONESU", "BILIRUBINUA", "OCCULTUA", "NITRITE", "UROBILINOGEN", "LEUKOCYTESUR", "RBCUA", "WBCUA", "BACTERIA", "SQUAMEPITHEL", "HYALINECASTS" in the last 168 hours.    Invalid input(s): "WRIGHTSUR"  All pertinent labs results from the past 24 hours have been reviewed.    Significant Imaging:  All pertinent imaging results/findings from the past 24 hours have been reviewed.    "

## 2024-02-25 NOTE — ED NOTES
Pt c/o urostomy tube coming out while changing the bag. Pt states she was here on the 2/6 for colostomy reversal and urostomy placement.

## 2024-02-25 NOTE — ASSESSMENT & PLAN NOTE
Edita Riley is a 59 y.o.F recently s/p ileostomy reversal and left ureterocolic reimplant on 02/06/2024.    - No acute urologic interventions indicated  - Recommend single dose IV gentamicin while in ED  - No imaging indicated at this time  - Will arrange close outpatient follow up with Dr. Balbuena with BMP  - Rest of care per ED  - Ok for discharge from urologic perspective

## 2024-02-25 NOTE — ED PROVIDER NOTES
Encounter Date: 2/25/2024       History     Chief Complaint   Patient presents with    Urostomy Tube Came Out     Surg on 2/6     Patient is a 59-year-old female with past medical history of cervical cancer with metastatic lesions in her abdomen with recent multiple complications that presents to the emergency department after urostomy stents were self removed while changing her dressings today.  Patient recently had surgery to create a urostomy in her abdomen.  Stent stents w were placed in bilateral ureters and kidneys and discharged on 02/18.  Patient states that she has been doing well had urine production in her ostomy.  Currently denying any other symptoms, no fevers, no abdominal pain, no back pain, no confusion or chills.    The history is provided by the patient and medical records.     Review of patient's allergies indicates:   Allergen Reactions    Bee sting [allergen ext-venom-honey bee]      Rash      Grass pollen-bermuda, standard      rash     Past Medical History:   Diagnosis Date    Abnormal mammogram 08/25/2020    Abnormal mammogram 08/25/2020    Acute blood loss anemia     Acute deep vein thrombosis (DVT) of lower extremity 12/09/2020    Advance care planning 04/30/2021    ODESSA (acute kidney injury) 03/21/2020    Anemia due to chronic blood loss     Anemia due to chronic kidney disease     Anemia due to chronic kidney disease 05/18/2020    Anxiety     Bilateral ureteral obstruction 09/11/2020    Bilious vomiting with nausea 06/11/2023    Cardiovascular event risk -- low 09/14/2015    ASCVD 10-year risk 1.9% (with optimal risk factors 1.3%) as of 9/14/2015     Cervical cancer 2014    Chronic back pain     Colostomy care     Deep vein thrombosis     Depression     Diarrhea due to malabsorption 11/14/2018    Difficult intubation     Discharge planning issues 04/30/2021    Disorder of kidney and ureter     DVT of lower extremity, bilateral 11/04/2020    Edema 04/10/2023    Emphysema of lung 04/10/2023     Fibromyalgia     Fungemia 09/27/2020    Generalized abdominal pain 08/25/2020    GERD (gastroesophageal reflux disease)     Hemifacial spasm 09/16/2015    Hiatal hernia 2014    History of cervical cancer 10/11/2018    History of essential hypertension 05/04/2015    Not requiring medications at this time  BP is low- concern that this may correlate with increased stool output from stoma. Encourage her to contact GI and her PCP.    Hx of psychiatric care     Cymbalta, trazodone    Hypertension     Hypomagnesemia 11/21/2018    Hyponatremia 09/10/2023    Impaired functional mobility, balance, gait, and endurance 07/24/2015    Lactose intolerance     Major depressive disorder, recurrent episode, moderate 06/02/2023    Metastatic squamous cell carcinoma to lymph node 10/11/2018    Moderate episode of recurrent major depressive disorder 04/21/2021    Nephrostomy complication 10/26/2023    Neuropathy due to chemotherapeutic drug 11/14/2018    Osteoarthritis of back     Peritonitis 09/22/2020    Physical deconditioning 04/30/2021    Pseudomonas urinary tract infection 04/21/2021    Psychiatric problem     Pyelitis 09/09/2023    QT prolongation 04/16/2021    Refusal of blood transfusions as patient is Shinto     Schatzki's ring 09/14/2015    Seen on outside EGD 05/2014, underwent esophageal dilatation. Bx were negative.     Seizure-like activity 12/02/2018    Seizures     Sleep stage dysfunction     Noted on PSG 06/2017; negative for obstructive sleep apnea     Stroke     Urinary tract infection associated with nephrostomy catheter 06/11/2020    Wound infection after surgery 09/24/2020     Past Surgical History:   Procedure Laterality Date    ANASTOMOSIS OF INTESTINE N/A 2/6/2024    Procedure: ANASTOMOSIS, INTESTINE - Ileocolic anastomosis;  Surgeon: Hammad Reyonlds MD;  Location: [facility] OR [address] [place] [address];  Service: Colon and Rectal;  Laterality: N/A;    ANTEGRADE NEPHROSTOGRAPHY Bilateral 9/28/2020    Procedure:  Nephrostogram - antegrade;  Surgeon: Leslie Balbuena MD;  Location: [facility] OR 1ST FLR;  Service: Urology;  Laterality: Bilateral;    ANTEGRADE NEPHROSTOGRAPHY Left 4/20/2021    Procedure: NEPHROSTOGRAM, ANTEGRADE;  Surgeon: Leslie Balbuena MD;  Location: [facility] OR 1ST FLR;  Service: Urology;  Laterality: Left;    ANTEGRADE NEPHROSTOGRAPHY Right 10/27/2022    Procedure: NEPHROSTOGRAM, ANTEGRADE;  Surgeon: Chirag Russ MD;  Location: [facility] OR 1ST FLR;  Service: Urology;  Laterality: Right;    ANTEGRADE NEPHROSTOGRAPHY Right 4/21/2023    Procedure: NEPHROSTOGRAM, ANTEGRADE;  Surgeon: Chirag Russ MD;  Location: [facility] OR 2ND FLR;  Service: Urology;  Laterality: Right;    ANTEGRADE NEPHROSTOGRAPHY Left 6/6/2023    Procedure: Nephrostogram - antegrade;  Surgeon: Leslie Balbuena MD;  Location: [facility] OR 1ST FLR;  Service: Urology;  Laterality: Left;    BILATERAL OOPHORECTOMY  2015    CHOLECYSTECTOMY  11/09/2016    Done at [facility], path showed chronic cholecystitis and gallstones    cold knife conization  2014    COLECTOMY, RIGHT  9/17/2020    Procedure: COLECTOMY, RIGHT Extended;  Surgeon: Hammad Reynolds MD;  Location: [facility] OR 2ND FLR;  Service: Colon and Rectal;;    COLONOSCOPY  2014    [facility] N[facility]24[facility]2016    [facility]    COLONOSCOPY N/A 5/18/2018    Procedure: COLONOSCOPY;  Surgeon: Arden Gutiérrez MD;  Location: [facility] ENDO (4TH FLR);  Service: Endoscopy;  Laterality: N/A;    COLONOSCOPY N/A 7/28/2020    Procedure: COLONOSCOPY;  Surgeon: Hammad Reynolds MD;  Location: [facility] ENDO (4TH FLR);  Service: Colon and Rectal;  Laterality: N/A;  covid test [place] 7/25    CYSTOSCOPY WITH URETEROSCOPY, RETROGRADE PYELOGRAPHY, AND INSERTION OF STENT Bilateral 3/21/2020    Procedure: CYSTOSCOPY, WITH RETROGRADE PYELOGRAM,;  Surgeon: Leslie Balbuena MD;  Location: [facility] OR 1ST FLR;  Service: Urology;  Laterality: Bilateral;    DILATION OF NEPHROSTOMY TRACT Right 10/27/2022    Procedure: DILATION,  NEPHROSTOMY TRACT;  Surgeon: Chirag Russ MD;  Location: [facility] OR 1ST FLR;  Service: Urology;  Laterality: Right;    ESOPHAGOGASTRODUODENOSCOPY  2014    ESOPHAGOGASTRODUODENOSCOPY  11/18/2020    ESOPHAGOGASTRODUODENOSCOPY N/A 11/18/2020    Procedure: ESOPHAGOGASTRODUODENOSCOPY (EGD);  Surgeon: Zenon Spencer MD;  Location: [facility] ENDO;  Service: Endoscopy;  Laterality: N/A;    ESOPHAGOGASTRODUODENOSCOPY N/A 12/11/2020    Procedure: EGD (ESOPHAGOGASTRODUODENOSCOPY);  Surgeon: Juancho Muse MD;  Location: [facility] ENDO (2ND FLR);  Service: Endoscopy;  Laterality: N/A;    EXCISION, SMALL INTESTINE  2/6/2024    Procedure: EXCISION, SMALL INTESTINE;  Surgeon: Hammad Reynolds MD;  Location: [facility] OR 2ND FLR;  Service: Colon and Rectal;;    HYSTERECTOMY  2014    [facility] for cervical cancer    ILEOSTOMY  9/17/2020    Procedure: CREATION, ILEOSTOMY;  Surgeon: Hammad Reynolds MD;  Location: [facility] OR 2ND FLR;  Service: Colon and Rectal;;    ILEOSTOMY CLOSURE N/A 2/6/2024    Procedure: CLOSURE, ILEOSTOMY;  Surgeon: Hammad Reynolds MD;  Location: [facility] OR 2ND FLR;  Service: Colon and Rectal;  Laterality: N/A;    LAPAROTOMY, EXPLORATORY N/A 2/6/2024    Procedure: OPEN LAPAROTOMY, EXPLORATORY;  Surgeon: Leslie Balbuena MD;  Location: [facility] OR 2ND FLR;  Service: Urology;  Laterality: N/A;  22 modifier    LOOPOGRAM N/A 4/20/2021    Procedure: LOOPOGRAM;  Surgeon: Leslie Balbuena MD;  Location: [facility] OR 1ST FLR;  Service: Urology;  Laterality: N/A;    LOOPOGRAM N/A 6/6/2023    Procedure: LOOPOGRAM;  Surgeon: Leslie Balbuena MD;  Location: [facility] OR 1ST FLR;  Service: Urology;  Laterality: N/A;    LYSIS OF ADHESIONS  2/6/2024    Procedure: LYSIS, ADHESIONS;  Surgeon: Leslie Balbuena MD;  Location: [facility] OR 2ND FLR;  Service: Urology;;    LYSIS OF ADHESIONS  2/6/2024    Procedure: LYSIS, ADHESIONS;  Surgeon: Hammad Reynolds MD;  Location: [facility] OR 2ND FLR;  Service: Colon and Rectal;;    MOBILIZATION OF SPLENIC FLEXURE N/A  9/11/2020    Procedure: MOBILIZATION, COLONIC;  Surgeon: Hammad Reynolds MD;  Location: [facility] OR 2ND FLR;  Service: Colon and Rectal;  Laterality: N/A;    NEPHROSTOGRAPHY Bilateral 4/17/2021    Procedure: Nephrostogram;  Surgeon: Celeste Surgeon;  Location: [facility] CELESTE;  Service: Anesthesiology;  Laterality: Bilateral;    OOPHORECTOMY      PERCUTANEOUS NEPHROLITHOTOMY Right 4/21/2023    Procedure: NEPHROLITHOTOMY, PERCUTANEOUS;  Surgeon: Chirag Russ MD;  Location: [facility] OR 2ND FLR;  Service: Urology;  Laterality: Right;  2.5 hrs    PERCUTANEOUS NEPHROSTOMY  4/21/2023    Procedure: CREATION, NEPHROSTOMY, PERCUTANEOUS with removal of existing nephrostomy tube;  Surgeon: Chirag Russ MD;  [facility];  Service: Urology;;    PYELOSCOPY Right 10/27/2022    Procedure: PYELOSCOPY;  Surgeon: Chirag Russ MD;  [facility];  Service: Urology;  Laterality: Right;    REPLACEMENT OF NEPHROSTOMY TUBE Right 10/27/2022    Procedure: REPLACEMENT, NEPHROSTOMY TUBE;  Surgeon: Chirag Russ MD;  [facility];  Service: Urology;  Laterality: Right;    RETROPERITONEAL LYMPHADENECTOMY Right 9/17/2018    Procedure: LYMPHADENECTOMY, RETROPERITONEUM;  Surgeon: Sebas Reed MD;  [facility];  Service: General;  Laterality: Right;  ILIAC    REVISION OF ANASTOMOSIS OF URETER TO ILEAL CONDUIT N/A 2/6/2024    Procedure: REVISION, ANASTOMOSIS, URETEROILEAL;  Surgeon: Leslie Balbuena MD;  [facility];  Service: Urology;  Laterality: N/A;    URETEROSCOPIC REMOVAL OF URETERIC CALCULUS Right 10/27/2022    Procedure: REMOVAL, CALCULUS, URETER, URETEROSCOPIC;  Surgeon: Chirag Russ MD;  [facility];  Service: Urology;  Laterality: Right;    URETEROSCOPY Right 10/27/2022    Procedure: URETEROSCOPY-ANTEGRADE;  Surgeon: Chirag Russ MD;  [facility];  Service: Urology;  Laterality: Right;     Family History   Problem  Relation Age of Onset    Heart disease Sister     Heart disease Maternal Grandmother     Colon cancer Father     Esophageal cancer Father     Cancer Father         Lung-smoker    Cancer Mother         Cervical    Cervical cancer Mother     Breast cancer Maternal Aunt     Suicide Daughter         jumped from parking structure    Drug abuse Daughter     Drug abuse Daughter     Rectal cancer Neg Hx     Stomach cancer Neg Hx     Crohn's disease Neg Hx     Ulcerative colitis Neg Hx     Diabetes Neg Hx     Hypertension Neg Hx      Social History     Tobacco Use    Smoking status: Never    Smokeless tobacco: Never   Substance Use Topics    Alcohol use: No     Alcohol/week: 0.0 standard drinks of alcohol    Drug use: No     Review of Systems  ROS negative except as noted in HPI    Physical Exam     Initial Vitals [02/25/24 1450]   BP Pulse Resp Temp SpO2   135/60 96 17 97.9 °F (36.6 °C) 99 %      MAP       --         Physical Exam    Nursing note and vitals reviewed.  Constitutional: She appears well-developed and well-nourished. She is not diaphoretic. No distress.   HENT:   Head: Normocephalic and atraumatic.   Right Ear: External ear normal.   Left Ear: External ear normal.   Nose: Nose normal.   Mouth/Throat: Oropharynx is clear and moist.   Eyes: Conjunctivae are normal. Right eye exhibits no discharge. Left eye exhibits no discharge.   Neck: No JVD present.   Normal range of motion.  Cardiovascular:  Normal rate, regular rhythm, normal heart sounds and intact distal pulses.           Pulmonary/Chest: Breath sounds normal. No respiratory distress. She has no wheezes.   Abdominal: Abdomen is soft. She exhibits no distension. There is no abdominal tenderness.   Midline incision clean dry intact with staples still present, incision on right lower quadrant well healing, urostomy on left lower quadrant patent and productive   Musculoskeletal:         General: No tenderness. Normal range of motion.      Cervical back: Normal  range of motion.     Neurological: She is alert and oriented to person, place, and time. She has normal strength. GCS score is 15. GCS eye subscore is 4. GCS verbal subscore is 5. GCS motor subscore is 6.   Skin: Skin is warm. No abscess noted. No erythema.   Psychiatric: She has a normal mood and affect.           ED Course   Procedures  Labs Reviewed   HIV 1 / 2 ANTIBODY   HEPATITIS C ANTIBODY   CBC W/ AUTO DIFFERENTIAL   COMPREHENSIVE METABOLIC PANEL   MAGNESIUM   PROTIME-INR          Imaging Results    None          Medications - No data to display  Medical Decision Making  Patient is a 59-year-old female with past medical history of cervical cancer with metastatic lesions in her abdomen with recent multiple complications that presents to the emergency department after urostomy stents were self removed while changing her dressings today.    On initial evaluation patient is speaking in full sentences, vitals are within normal limits and cooperative with history and physical examination.    Differential:  Considering complications of removal of stent placements including hydronephrosis, acute kidney injury or other ureteral injury.  Urostomy appears to be patent and functioning with urine in her bag.  Recently discharged with instructions to follow up in clinic for staple removal in the upcoming week.  Given the proximity to recent surgeries and removal of urology stents discussed with on-call Urology who will evaluate the patient and provide any further recommendations.  We will obtain basic labs to look for kidney injury, also to prepare if they need for any operative planning    See ED course for any updated lab interpretation.    Urology evaluated the patient they did not need to replace her ureteral stents at this time.  They recommend gentamicin x1 and discharge with follow up outpatient.    Updated the patient about this plan.  She was agreeable and had no further questions or concerns at this time.   Appropriate return precautions were given stable for discharge    Amount and/or Complexity of Data Reviewed  Labs: ordered. Decision-making details documented in ED Course.               ED Course as of 02/25/24 1801   Sun Feb 25, 2024   1642 EKG done at 4:04 p.m. independently interpreted by me normal sinus rhythm rate of 87 normal axis [GK]   1702 CBC auto differential(!)  Patient does have thrombocytosis, and anemia.  Anemia appears to be at her current baseline low concern for blood loss, no evidence of lost clinically. [TK]   1723 Comprehensive metabolic panel(!)  Kidney function currently at baseline. There is CKD [TK]      ED Course User Index  [GK] Kayley Fontanez MD  [TK] Romero Oleary DO                           Clinical Impression:  Final diagnoses:  [Z01.810] Preoperative cardiovascular examination  [C53.9] Malignant neoplasm of cervix, unspecified site  [Z43.6] Attention to urostomy (Primary)                 Romero Oleary DO  Resident  02/25/24 1802

## 2024-02-25 NOTE — CONSULTS
[facility]  Urology  Consult Note    Patient Name: Edita Riley  MRN: [id number]  Admission Date: 2/25/2024  Hospital Length of Stay: 0   Code Status: Prior   Attending Provider: Kayley Fontanez,*   Consulting Provider: James Guzman MD  Primary Care Physician: Trina Vu MD  Principal Problem:<principal problem not specified>    Inpatient consult to Urology  Consult performed by: James Guzman MD  Consult ordered by: Romero Oleary DO  Reason for consult: Accidental single J stent removal          Subjective:     HPI:  Edita Riley is a 59 y.o. female with a history of cervical cancer s/p XRT complicated by end-stage bladder and distal ureteral strictures. She underwent urinary diversion with a transverse colon conduit in 2020 complicated by a bowel perforation resulting in an ileostomy creation for GI diversion. She is now s/p surgical repair of her left ureterocolic anastomotic stricture and ileostomy reversal on 2/6/24, complicated by post-operative ileus. She was ultimately discharged 02/18/2024. [place] [facility] [place] due to accidental removal of her left single J stent while changing her urostomy appliance. Urology consulted for management recommendations.     On assessment, AFVSS. Other labs pending. No imaging for review.     She denies flank pain, fever, chills, and abdominal pain. She has had good UOP, bowel movements regularly, and is ambulating well.     Past Medical History:   Diagnosis Date    Abnormal mammogram 08/25/2020    Abnormal mammogram 08/25/2020    Acute blood loss anemia     Acute deep vein thrombosis (DVT) of lower extremity 12/09/2020    Advance care planning 04/30/2021    ODESSA (acute kidney injury) 03/21/2020    Anemia due to chronic blood loss     Anemia due to chronic kidney disease     Anemia due to chronic kidney disease 05/18/2020    Anxiety     Bilateral ureteral obstruction 09/11/2020    Bilious vomiting with  nausea 06/11/2023    Cardiovascular event risk -- low 09/14/2015    ASCVD 10-year risk 1.9% (with optimal risk factors 1.3%) as of 9/14/2015     Cervical cancer 2014    Chronic back pain     Colostomy care     Deep vein thrombosis     Depression     Diarrhea due to malabsorption 11/14/2018    Difficult intubation     Discharge planning issues 04/30/2021    Disorder of kidney and ureter     DVT of lower extremity, bilateral 11/04/2020    Edema 04/10/2023    Emphysema of lung 04/10/2023    Fibromyalgia     Fungemia 09/27/2020    Generalized abdominal pain 08/25/2020    GERD (gastroesophageal reflux disease)     Hemifacial spasm 09/16/2015    Hiatal hernia 2014    History of cervical cancer 10/11/2018    History of essential hypertension 05/04/2015    Not requiring medications at this time  BP is low- concern that this may correlate with increased stool output from stoma. Encourage her to contact GI and her PCP.    Hx of psychiatric care     Cymbalta, trazodone    Hypertension     Hypomagnesemia 11/21/2018    Hyponatremia 09/10/2023    Impaired functional mobility, balance, gait, and endurance 07/24/2015    Lactose intolerance     Major depressive disorder, recurrent episode, moderate 06/02/2023    Metastatic squamous cell carcinoma to lymph node 10/11/2018    Moderate episode of recurrent major depressive disorder 04/21/2021    Nephrostomy complication 10/26/2023    Neuropathy due to chemotherapeutic drug 11/14/2018    Osteoarthritis of back     Peritonitis 09/22/2020    Physical deconditioning 04/30/2021    Pseudomonas urinary tract infection 04/21/2021    Psychiatric problem     Pyelitis 09/09/2023    QT prolongation 04/16/2021    Refusal of blood transfusions as patient is [employer]     Schatzki's ring 09/14/2015    Seen on outside EGD 05/2014, underwent esophageal dilatation. Bx were negative.     Seizure-like activity 12/02/2018    Seizures     Sleep stage dysfunction     Noted on PSG 06/2017; negative  for obstructive sleep apnea     Stroke     Urinary tract infection associated with nephrostomy catheter 06/11/2020    Wound infection after surgery 09/24/2020       Past Surgical History:   Procedure Laterality Date    ANASTOMOSIS OF INTESTINE N/A 2/6/2024    Procedure: ANASTOMOSIS, INTESTINE - Ileocolic anastomosis;  Surgeon: Hammad Reynolds MD;  Location: [facility] OR 2ND FLR;  Service: Colon and Rectal;  Laterality: N/A;    ANTEGRADE NEPHROSTOGRAPHY Bilateral 9/28/2020    Procedure: Nephrostogram - antegrade;  Surgeon: Leslie Balbuena MD;  Location: [facility] OR 1ST FLR;  Service: Urology;  Laterality: Bilateral;    ANTEGRADE NEPHROSTOGRAPHY Left 4/20/2021    Procedure: NEPHROSTOGRAM, ANTEGRADE;  Surgeon: Leslie Balbuena MD;  Location: [facility] OR 1ST FLR;  Service: Urology;  Laterality: Left;    ANTEGRADE NEPHROSTOGRAPHY Right 10/27/2022    Procedure: NEPHROSTOGRAM, ANTEGRADE;  Surgeon: Chirag Russ MD;  Location: [facility] OR 1ST FLR;  Service: Urology;  Laterality: Right;    ANTEGRADE NEPHROSTOGRAPHY Right 4/21/2023    Procedure: NEPHROSTOGRAM, ANTEGRADE;  Surgeon: Chirag Russ MD;  Location: [facility] OR 2ND FLR;  Service: Urology;  Laterality: Right;    ANTEGRADE NEPHROSTOGRAPHY Left 6/6/2023    Procedure: Nephrostogram - antegrade;  Surgeon: Leslie Balbuena MD;  Location: [facility] OR 1ST FLR;  Service: Urology;  Laterality: Left;    BILATERAL OOPHORECTOMY  2015    CHOLECYSTECTOMY  11/09/2016    Done at [facility], path showed chronic cholecystitis and gallstones    cold knife conization  2014    COLECTOMY, RIGHT  9/17/2020    Procedure: COLECTOMY, RIGHT Extended;  Surgeon: Hammad Reynolds MD;  Location: [facility] OR 2ND FLR;  Service: Colon and Rectal;;    COLONOSCOPY  2014    COLONOSCOPY N/A 10/24/2016    at [facility], Dr Thomas    COLONOSCOPY N/A 5/18/2018    Procedure: COLONOSCOPY;  Surgeon: Arden Gutiérrez MD;  Location: [facility] ENDO (4TH FLR);  Service: Endoscopy;  Laterality: N/A;    COLONOSCOPY N/A 7/28/2020    Procedure:  COLONOSCOPY;  Surgeon: Hammad Reynolds MD;  Location: [facility] ENDO (4TH FLR);  Service: Colon and Rectal;  Laterality: N/A;  covid test [place] 7/25    CYSTOSCOPY WITH URETEROSCOPY, RETROGRADE PYELOGRAPHY, AND INSERTION OF STENT Bilateral 3/21/2020    Procedure: CYSTOSCOPY, WITH RETROGRADE PYELOGRAM,;  Surgeon: Leslie Balbuena MD;  Location: [facility] OR 1ST FLR;  Service: Urology;  Laterality: Bilateral;    DILATION OF NEPHROSTOMY TRACT Right 10/27/2022    Procedure: DILATION, NEPHROSTOMY TRACT;  Surgeon: Chirag Russ MD;  Location: [facility] OR 1ST FLR;  Service: Urology;  Laterality: Right;    ESOPHAGOGASTRODUODENOSCOPY  2014    ESOPHAGOGASTRODUODENOSCOPY  11/18/2020    ESOPHAGOGASTRODUODENOSCOPY N/A 11/18/2020    Procedure: ESOPHAGOGASTRODUODENOSCOPY (EGD);  Surgeon: Zenon Spencer MD;  Location: [facility];  Service: Endoscopy;  Laterality: N/A;    ESOPHAGOGASTRODUODENOSCOPY N/A 12/11/2020    Procedure: EGD (ESOPHAGOGASTRODUODENOSCOPY);  Surgeon: Juancho Muse MD;  Location: [facility] ENDO (2ND FLR);  Service: Endoscopy;  Laterality: N/A;    EXCISION, SMALL INTESTINE  2/6/2024    Procedure: EXCISION, SMALL INTESTINE;  Surgeon: Hammad Reynolds MD;  Location: [facility] OR 2ND FLR;  Service: Colon and Rectal;;    HYSTERECTOMY  2014    [facility] for cervical cancer    ILEOSTOMY  9/17/2020    Procedure: CREATION, ILEOSTOMY;  Surgeon: Hammad Reynolds MD;  Location: [facility] OR 2ND FLR;  Service: Colon and Rectal;;    ILEOSTOMY CLOSURE N/A 2/6/2024    Procedure: CLOSURE, ILEOSTOMY;  Surgeon: Hammad Reynolds MD;  Location: [facility] OR 2ND FLR;  Service: Colon and Rectal;  Laterality: N/A;    LAPAROTOMY, EXPLORATORY N/A 2/6/2024    Procedure: OPEN LAPAROTOMY, EXPLORATORY;  Surgeon: Leslie Balbuena MD;  Location: [facility] OR 2ND FLR;  Service: Urology;  Laterality: N/A;  22 modifier    LOOPOGRAM N/A 4/20/2021    Procedure: LOOPOGRAM;  Surgeon: Leslie Balbuena MD;  Location: [facility] OR [address] [place] [address];  Service: Urology;  Laterality: N/A;    LOOPOGRAM  N/A 6/6/2023    Procedure: LOOPOGRAM;  Surgeon: Leslie Balbuena MD;  Location: [facility] OR 1ST FLR;  Service: Urology;  Laterality: N/A;    LYSIS OF ADHESIONS  2/6/2024    Procedure: LYSIS, ADHESIONS;  Surgeon: Leslie Balbuena MD;  Location: [facility] OR 2ND FLR;  Service: Urology;;    LYSIS OF ADHESIONS  2/6/2024    Procedure: LYSIS, ADHESIONS;  Surgeon: Hammad Reynolds MD;  Location: [facility] OR 2ND FLR;  Service: Colon and Rectal;;    MOBILIZATION OF SPLENIC FLEXURE N/A 9/11/2020    Procedure: MOBILIZATION, COLONIC;  Surgeon: Hammad Reynolds MD;  Location: [facility] OR 2ND FLR;  Service: Colon and Rectal;  Laterality: N/A;    NEPHROSTOGRAPHY Bilateral 4/17/2021    Procedure: Nephrostogram;  Surgeon: Celeste Surgeon;  Location: [facility];  Service: Anesthesiology;  Laterality: Bilateral;    OOPHORECTOMY      PERCUTANEOUS NEPHROLITHOTOMY Right 4/21/2023    Procedure: NEPHROLITHOTOMY, PERCUTANEOUS;  Surgeon: Chirag Russ MD;  Location: [facility] OR 2ND FLR;  Service: Urology;  Laterality: Right;  2.5 hrs    PERCUTANEOUS NEPHROSTOMY  4/21/2023    Procedure: CREATION, NEPHROSTOMY, PERCUTANEOUS with removal of existing nephrostomy tube;  Surgeon: Chirag Russ MD;  Location: [facility] OR 2ND FLR;  Service: Urology;;    PYELOSCOPY Right 10/27/2022    Procedure: PYELOSCOPY;  Surgeon: Chirag Russ MD;  Location: [facility] OR 1ST FLR;  Service: Urology;  Laterality: Right;    REPLACEMENT OF NEPHROSTOMY TUBE Right 10/27/2022    Procedure: REPLACEMENT, NEPHROSTOMY TUBE;  Surgeon: Chirag Russ MD;  Location: [facility] OR 1ST FLR;  Service: Urology;  Laterality: Right;    RETROPERITONEAL LYMPHADENECTOMY Right 9/17/2018    Procedure: LYMPHADENECTOMY, RETROPERITONEUM;  Surgeon: Sebas Reed MD;  Location: [facility] OR 2ND FLR;  Service: General;  Laterality: Right;  ILIAC    REVISION OF ANASTOMOSIS OF URETER TO ILEAL CONDUIT N/A 2/6/2024    Procedure: REVISION, ANASTOMOSIS, URETEROILEAL;  Surgeon: Leslie Balbuena MD;  Location: [facility] OR  2ND FLR;  Service: Urology;  Laterality: N/A;    URETEROSCOPIC REMOVAL OF URETERIC CALCULUS Right 10/27/2022    Procedure: REMOVAL, CALCULUS, URETER, URETEROSCOPIC;  Surgeon: Chirag Russ MD;  [facility] [facility] [place] 1ST FLR;  Service: Urology;  Laterality: Right;    URETEROSCOPY Right 10/27/2022    Procedure: URETEROSCOPY-ANTEGRADE;  Surgeon: Chirag Russ MD;  Location: [facility] OR 1ST FLR;  Service: Urology;  Laterality: Right;       Review of patient's allergies indicates:   Allergen Reactions    Bee sting [allergen ext-venom-honey bee]      Rash      Grass pollen-bermuda, standard      rash       Family History       Problem Relation (Age of Onset)    Breast cancer Maternal Aunt    Cancer Father, Mother    Cervical cancer Mother    Colon cancer Father    Drug abuse Daughter, Daughter    Esophageal cancer Father    Heart disease Sister, Maternal Grandmother    Suicide Daughter            Tobacco Use    Smoking status: Never    Smokeless tobacco: Never   Substance and Sexual Activity    Alcohol use: No     Alcohol/week: 0.0 standard drinks of alcohol    Drug use: No    Sexual activity: Yes     Partners: Male     Birth control/protection: None     Comment:  19 years since 1999       Review of Systems  See HPI  Objective:     Temp:  [97.9 °F (36.6 °C)] 97.9 °F (36.6 °C)  Pulse:  [85-96] 85  Resp:  [14-17] 14  SpO2:  [99 %-100 %] 100 %  BP: (122-138)/(60-87) 138/87  Weight: 83.9 kg (185 lb)  Body mass index is 27.32 kg/m².           Drains       Drain  Duration                  Urostomy 02/06/24 1815 ureterostomy, left 18 days                     Physical Exam  Constitutional:       General: She is not in acute distress.     Appearance: Normal appearance. She is not ill-appearing.   HENT:      Head: Normocephalic and atraumatic.      Mouth/Throat:      Mouth: Mucous membranes are moist.   Eyes:      Extraocular Movements: Extraocular movements intact.   Cardiovascular:      Rate and Rhythm: Normal rate.  "  Pulmonary:      Effort: Pulmonary effort is normal.   Abdominal:      General: There is no distension.      Palpations: Abdomen is soft.      Tenderness: There is no abdominal tenderness.      Comments: Midline incision CDI with staples in place  RUQ ORESTES drain site CDI   Genitourinary:     Comments: LLQ ostomy with clear yellow urine output  Skin:     General: Skin is warm and dry.   Neurological:      Mental Status: She is alert and oriented to person, place, and time.          Significant Labs:    BMP:  Recent Labs   Lab 02/25/24  1653      K 4.2      CO2 23   BUN 12   CREATININE 1.5*   CALCIUM 9.4       CBC:  Recent Labs   Lab 02/25/24  1653   WBC 7.04   HGB 9.9*   HCT 33.6*   *       Urine Studies: No results for input(s): "COLORU", "APPEARANCEUA", "PHUR", "SPECGRAV", "PROTEINUA", "GLUCUA", "KETONESU", "BILIRUBINUA", "OCCULTUA", "NITRITE", "UROBILINOGEN", "LEUKOCYTESUR", "RBCUA", "WBCUA", "BACTERIA", "SQUAMEPITHEL", "HYALINECASTS" in the last 168 hours.    Invalid input(s): "WRIGHTSUR"  All pertinent labs results from the past 24 hours have been reviewed.    Significant Imaging:  All pertinent imaging results/findings from the past 24 hours have been reviewed.      Assessment and Plan:     Hydronephrosis of left kidney  Edita Riley is a 59 y.o.F recently s/p ileostomy reversal and left ureterocolic reimplant on 02/06/2024.    - No acute urologic interventions indicated  - Recommend single dose IV gentamicin while in ED  - No imaging indicated at this time  - Will arrange close outpatient follow up with Dr. Balbuena with BMP  - Rest of care per ED  - Ok for discharge from urologic perspective        James Guzman MD  Urology  [facility]  "

## 2024-02-25 NOTE — HPI
Edita Riley is a 59 y.o. female with a history of cervical cancer s/p XRT complicated by end-stage bladder and distal ureteral strictures. She underwent urinary diversion with a transverse colon conduit in 2020 complicated by a bowel perforation resulting in an ileostomy creation for GI diversion. She is now s/p surgical repair of her left ureterocolic anastomotic stricture and ileostomy reversal on 2/6/24, complicated by post-operative ileus. She was ultimately discharged 02/18/2024. She presented to the [facility] ED today due to accidental removal of her left single J stent while changing her urostomy appliance. Urology consulted for management recommendations.     On assessment, AFVSS. Other labs pending. No imaging for review.     She denies flank pain, fever, chills, and abdominal pain. She has had good UOP, bowel movements regularly, and is ambulating well.

## 2024-02-26 LAB
OHS QRS DURATION: 80 MS
OHS QTC CALCULATION: 445 MS

## 2024-02-29 ENCOUNTER — LAB VISIT (OUTPATIENT)
Dept: LAB | Facility: HOSPITAL | Age: 59
End: 2024-02-29
Payer: MEDICAID

## 2024-02-29 DIAGNOSIS — N13.30 HYDRONEPHROSIS OF LEFT KIDNEY: ICD-10-CM

## 2024-02-29 LAB
ANION GAP SERPL CALC-SCNC: 12 MMOL/L (ref 8–16)
BUN SERPL-MCNC: 14 MG/DL (ref 8–23)
CALCIUM SERPL-MCNC: 9.6 MG/DL (ref 8.7–10.5)
CHLORIDE SERPL-SCNC: 109 MMOL/L (ref 95–110)
CO2 SERPL-SCNC: 20 MMOL/L (ref 23–29)
CREAT SERPL-MCNC: 1.2 MG/DL (ref 0.5–1.4)
EST. GFR  (NO RACE VARIABLE): 51.5 ML/MIN/1.73 M^2
GLUCOSE SERPL-MCNC: 104 MG/DL (ref 70–110)
POTASSIUM SERPL-SCNC: 3.7 MMOL/L (ref 3.5–5.1)
SODIUM SERPL-SCNC: 141 MMOL/L (ref 136–145)

## 2024-02-29 PROCEDURE — 80048 BASIC METABOLIC PNL TOTAL CA: CPT

## 2024-02-29 PROCEDURE — 36415 COLL VENOUS BLD VENIPUNCTURE: CPT

## 2024-03-04 ENCOUNTER — OFFICE VISIT (OUTPATIENT)
Dept: UROLOGY | Facility: CLINIC | Age: 59
End: 2024-03-04
Payer: MEDICAID

## 2024-03-04 VITALS
HEIGHT: 69 IN | HEART RATE: 89 BPM | BODY MASS INDEX: 27.58 KG/M2 | WEIGHT: 186.19 LBS | DIASTOLIC BLOOD PRESSURE: 69 MMHG | SYSTOLIC BLOOD PRESSURE: 111 MMHG

## 2024-03-04 DIAGNOSIS — K91.89 SMALL BOWEL ANASTOMOTIC STRICTURE: Primary | ICD-10-CM

## 2024-03-04 DIAGNOSIS — K91.30 SMALL BOWEL ANASTOMOTIC STRICTURE: Primary | ICD-10-CM

## 2024-03-04 DIAGNOSIS — N13.1 HYDRONEPHROSIS WITH URETERAL STRICTURE: ICD-10-CM

## 2024-03-04 DIAGNOSIS — Z93.6 PRESENCE OF UROSTOMY: ICD-10-CM

## 2024-03-04 PROCEDURE — 1160F RVW MEDS BY RX/DR IN RCRD: CPT | Mod: CPTII,,,

## 2024-03-04 PROCEDURE — 99999 PR PBB SHADOW E&M-EST. PATIENT-LVL III: CPT | Mod: PBBFAC,,,

## 2024-03-04 PROCEDURE — 3078F DIAST BP <80 MM HG: CPT | Mod: CPTII,,,

## 2024-03-04 PROCEDURE — 99213 OFFICE O/P EST LOW 20 MIN: CPT | Mod: PBBFAC

## 2024-03-04 PROCEDURE — 99024 POSTOP FOLLOW-UP VISIT: CPT | Mod: ,,,

## 2024-03-04 PROCEDURE — 1159F MED LIST DOCD IN RCRD: CPT | Mod: CPTII,,,

## 2024-03-04 PROCEDURE — 3074F SYST BP LT 130 MM HG: CPT | Mod: CPTII,,,

## 2024-03-04 NOTE — PROGRESS NOTES
CHIEF COMPLAINT:  Post op staple removal       HISTORY OF PRESENTING ILLINESS:  Edita Riley is a 59 y.o. female with a history of cervical cancer s/p XRT complicated by end-stage bladder and distal ureteral strictures. Presents today for staple removal. She is doing well post op. She underwent urinary diversion with a transverse colon conduit in 2020 complicated by a bowel perforation resulting in an ileostomy creation for GI diversion. She is now s/p surgical repair of her left ureterocolic anastomotic stricture and ileostomy reversal on 2/6/24 with Dr. Balbuena, complicated by post-operative ileus. She was discharged 02/18/2024. She presented to the [facility] ED 2/25/24 due to accidental removal of her left single J stent while changing her urostomy appliance.         PATIENT HISTORY:  Past Medical History:   Diagnosis Date    Abnormal mammogram 08/25/2020    Abnormal mammogram 08/25/2020    Acute blood loss anemia     Acute deep vein thrombosis (DVT) of lower extremity 12/09/2020    Advance care planning 04/30/2021    ODESSA (acute kidney injury) 03/21/2020    Anemia due to chronic blood loss     Anemia due to chronic kidney disease     Anemia due to chronic kidney disease 05/18/2020    Anxiety     Bilateral ureteral obstruction 09/11/2020    Bilious vomiting with nausea 06/11/2023    Cardiovascular event risk -- low 09/14/2015    ASCVD 10-year risk 1.9% (with optimal risk factors 1.3%) as of 9/14/2015     Cervical cancer 2014    Chronic back pain     Colostomy care     Deep vein thrombosis     Depression     Diarrhea due to malabsorption 11/14/2018    Difficult intubation     Discharge planning issues 04/30/2021    Disorder of kidney and ureter     DVT of lower extremity, bilateral 11/04/2020    Edema 04/10/2023    Emphysema of lung 04/10/2023    Fibromyalgia     Fungemia 09/27/2020    Generalized abdominal pain 08/25/2020    GERD (gastroesophageal reflux disease)     Hemifacial spasm 09/16/2015    Hiatal  hernia 2014    History of cervical cancer 10/11/2018    History of essential hypertension 05/04/2015    Not requiring medications at this time  BP is low- concern that this may correlate with increased stool output from stoma. Encourage her to contact GI and her PCP.    Hx of psychiatric care     Cymbalta, trazodone    Hypertension     Hypomagnesemia 11/21/2018    Hyponatremia 09/10/2023    Impaired functional mobility, balance, gait, and endurance 07/24/2015    Lactose intolerance     Major depressive disorder, recurrent episode, moderate 06/02/2023    Metastatic squamous cell carcinoma to lymph node 10/11/2018    Moderate episode of recurrent major depressive disorder 04/21/2021    Nephrostomy complication 10/26/2023    Neuropathy due to chemotherapeutic drug 11/14/2018    Osteoarthritis of back     Peritonitis 09/22/2020    Physical deconditioning 04/30/2021    Pseudomonas urinary tract infection 04/21/2021    Psychiatric problem     Pyelitis 09/09/2023    QT prolongation 04/16/2021    Refusal of blood transfusions as patient is Religion     Schatzki's ring 09/14/2015    Seen on outside EGD 05/2014, underwent esophageal dilatation. Bx were negative.     Seizure-like activity 12/02/2018    Seizures     Sleep stage dysfunction     Noted on PSG 06/2017; negative for obstructive sleep apnea     Stroke     Urinary tract infection associated with nephrostomy catheter 06/11/2020    Wound infection after surgery 09/24/2020       Past Surgical History:   Procedure Laterality Date    ANASTOMOSIS OF INTESTINE N/A 2/6/2024    Procedure: ANASTOMOSIS, INTESTINE - Ileocolic anastomosis;  Surgeon: Hammad Reynolds MD;  Location: [facility] OR 2ND [facility];  Service: Colon and Rectal;  Laterality: N/A;    ANTEGRADE NEPHROSTOGRAPHY Bilateral 9/28/2020    Procedure: Nephrostogram - antegrade;  Surgeon: Leslie Balbuena MD;  Location: [facility] OR 1ST FLR;  Service: Urology;  Laterality: Bilateral;    ANTEGRADE NEPHROSTOGRAPHY Left  4/20/2021    Procedure: NEPHROSTOGRAM, ANTEGRADE;  Surgeon: Leslie Balbuena MD;  Location: [facility] OR 1ST FLR;  Service: Urology;  Laterality: Left;    ANTEGRADE NEPHROSTOGRAPHY Right 10/27/2022    Procedure: NEPHROSTOGRAM, ANTEGRADE;  Surgeon: Chirag Russ MD;  Location: [facility] OR 1ST FLR;  Service: Urology;  Laterality: Right;    ANTEGRADE NEPHROSTOGRAPHY Right 4/21/2023    Procedure: NEPHROSTOGRAM, ANTEGRADE;  Surgeon: Chirag Russ MD;  Location: [facility] OR 2ND FLR;  Service: Urology;  Laterality: Right;    ANTEGRADE NEPHROSTOGRAPHY Left 6/6/2023    Procedure: Nephrostogram - antegrade;  Surgeon: Leslie Balbuena MD;  Location: [facility] OR 1ST FLR;  Service: Urology;  Laterality: Left;    BILATERAL OOPHORECTOMY  2015    CHOLECYSTECTOMY  11/09/2016    Done at [facility], path showed chronic cholecystitis and gallstones    cold knife conization  2014    COLECTOMY, RIGHT  9/17/2020    Procedure: COLECTOMY, RIGHT Extended;  Surgeon: Hammad Reynolds MD;  Location: [facility] OR 2ND FLR;  Service: Colon and Rectal;;    COLONOSCOPY  2014    [facility]    COLONOSCOPY N/A 5/18/2018    Procedure: COLONOSCOPY;  Surgeon: Arden Gutiérrez MD;  Location: [facility] ENDO (4TH FLR);  Service: Endoscopy;  Laterality: N/A;    COLONOSCOPY N/A 7/28/2020    Procedure: COLONOSCOPY;  Surgeon: Hammad Reynolds MD;  Location: [facility] ENDO (4TH FLR);  Service: Colon and Rectal;  Laterality: N/A;  covid test [place] 7/25    CYSTOSCOPY WITH URETEROSCOPY, RETROGRADE PYELOGRAPHY, AND INSERTION OF STENT Bilateral 3/21/2020    Procedure: CYSTOSCOPY, WITH RETROGRADE PYELOGRAM,;  Surgeon: Leslie Balbuena MD;  Location: [facility] OR 1ST FLR;  Service: Urology;  Laterality: Bilateral;    DILATION OF NEPHROSTOMY TRACT Right 10/27/2022    Procedure: DILATION, NEPHROSTOMY TRACT;  Surgeon: Chirag Russ MD;  Location: [facility] OR 1ST FLR;  Service: Urology;  Laterality: Right;    ESOPHAGOGASTRODUODENOSCOPY  2014     ESOPHAGOGASTRODUODENOSCOPY  11/18/2020    ESOPHAGOGASTRODUODENOSCOPY N/A 11/18/2020    Procedure: ESOPHAGOGASTRODUODENOSCOPY (EGD);  Surgeon: Zenon Spencer MD;  Location: [facility] ENDO;  Service: Endoscopy;  Laterality: N/A;    ESOPHAGOGASTRODUODENOSCOPY N/A 12/11/2020    Procedure: EGD (ESOPHAGOGASTRODUODENOSCOPY);  Surgeon: Juancho Muse MD;  Location: [facility] ENDO (2ND FLR);  Service: Endoscopy;  Laterality: N/A;    EXCISION, SMALL INTESTINE  2/6/2024    Procedure: EXCISION, SMALL INTESTINE;  Surgeon: Hammad Reynolds MD;  Location: [facility] OR 2ND FLR;  Service: Colon and Rectal;;    HYSTERECTOMY  2014    [facility] for cervical cancer    ILEOSTOMY  9/17/2020    Procedure: CREATION, ILEOSTOMY;  Surgeon: Hammad Reynolds MD;  Location: [facility] OR 2ND FLR;  Service: Colon and Rectal;;    ILEOSTOMY CLOSURE N/A 2/6/2024    Procedure: CLOSURE, ILEOSTOMY;  Surgeon: Hammad Reynolds MD;  Location: [facility] OR 2ND FLR;  Service: Colon and Rectal;  Laterality: N/A;    LAPAROTOMY, EXPLORATORY N/A 2/6/2024    Procedure: OPEN LAPAROTOMY, EXPLORATORY;  Surgeon: Leslie Balbuena MD;  Location: [facility] OR 2ND FLR;  Service: Urology;  Laterality: N/A;  22 modifier    LOOPOGRAM N/A 4/20/2021    Procedure: LOOPOGRAM;  Surgeon: Leslie Balbuena MD;  Location: [facility] OR 1ST FLR;  Service: Urology;  Laterality: N/A;    LOOPOGRAM N/A 6/6/2023    Procedure: LOOPOGRAM;  Surgeon: Leslie Balbuena MD;  Location: [facility] OR 1ST FLR;  Service: Urology;  Laterality: N/A;    LYSIS OF ADHESIONS  2/6/2024    Procedure: LYSIS, ADHESIONS;  Surgeon: Leslie Balbuena MD;  Location: [facility] OR 2ND FLR;  Service: Urology;;    LYSIS OF ADHESIONS  2/6/2024    Procedure: LYSIS, ADHESIONS;  Surgeon: Hammad Reynolds MD;  Location: [facility] OR 2ND FLR;  Service: Colon and Rectal;;    MOBILIZATION OF SPLENIC FLEXURE N/A 9/11/2020    Procedure: MOBILIZATION, COLONIC;  Surgeon: Hammad Reynolds MD;  Location: [facility] OR 2ND FLR;  Service: Colon and Rectal;  Laterality: N/A;     NEPHROSTOGRAPHY Bilateral 4/17/2021    Procedure: Nephrostogram;  Surgeon: Celeste Surgeon;  Location: [facility];  Service: Anesthesiology;  Laterality: Bilateral;    OOPHORECTOMY      PERCUTANEOUS NEPHROLITHOTOMY Right 4/21/2023    Procedure: NEPHROLITHOTOMY, PERCUTANEOUS;  Surgeon: Chirag Russ MD;  Location: [facility] OR 2ND FLR;  Service: Urology;  Laterality: Right;  2.5 hrs    PERCUTANEOUS NEPHROSTOMY  4/21/2023    Procedure: CREATION, NEPHROSTOMY, PERCUTANEOUS with removal of existing nephrostomy tube;  Surgeon: Chirag Russ MD;  Location: [facility] OR 2ND FLR;  Service: Urology;;    PYELOSCOPY Right 10/27/2022    Procedure: PYELOSCOPY;  Surgeon: Chirag Russ MD;  [facility];  Service: Urology;  Laterality: Right;    REPLACEMENT OF NEPHROSTOMY TUBE Right 10/27/2022    Procedure: REPLACEMENT, NEPHROSTOMY TUBE;  Surgeon: Chirag Russ MD;  [facility];  Service: Urology;  Laterality: Right;    RETROPERITONEAL LYMPHADENECTOMY Right 9/17/2018    Procedure: LYMPHADENECTOMY, RETROPERITONEUM;  Surgeon: Sebas Reed MD;  [facility];  Service: General;  Laterality: Right;  ILIAC    REVISION OF ANASTOMOSIS OF URETER TO ILEAL CONDUIT N/A 2/6/2024    Procedure: REVISION, ANASTOMOSIS, URETEROILEAL;  Surgeon: Leslie Balbuena MD;  [facility];  Service: Urology;  Laterality: N/A;    URETEROSCOPIC REMOVAL OF URETERIC CALCULUS Right 10/27/2022    Procedure: REMOVAL, CALCULUS, URETER, URETEROSCOPIC;  Surgeon: Chirag Russ MD;  [facility];  Service: Urology;  Laterality: Right;    URETEROSCOPY Right 10/27/2022    Procedure: URETEROSCOPY-ANTEGRADE;  Surgeon: Chirag Russ MD;  [facility];  Service: Urology;  Laterality: Right;       Family History   Problem Relation Age of Onset    Heart disease Sister     Heart disease Maternal Grandmother     Colon cancer Father     Esophageal cancer Father     Cancer Father          Lung-smoker    Cancer Mother         Cervical    Cervical cancer Mother     Breast cancer Maternal Aunt     Suicide Daughter         jumped from parking structure    Drug abuse Daughter     Drug abuse Daughter     Rectal cancer Neg Hx     Stomach cancer Neg Hx     Crohn's disease Neg Hx     Ulcerative colitis Neg Hx     Diabetes Neg Hx     Hypertension Neg Hx        Social History     Socioeconomic History    Marital status:      Spouse name: Hammad    Number of children: 2   Tobacco Use    Smoking status: Never    Smokeless tobacco: Never   Substance and Sexual Activity    Alcohol use: No     Alcohol/week: 0.0 standard drinks of alcohol    Drug use: No    Sexual activity: Yes     Partners: Male     Birth control/protection: None     Comment:  19 years since    Social History Narrative    , twin daughters (1  2018), disabled due to childhood stroke, [employer]     Social Determinants of Health     Financial Resource Strain: Medium Risk (2024)    Overall Financial Resource Strain (CARDIA)     Difficulty of Paying Living Expenses: Somewhat hard   Food Insecurity: No Food Insecurity (2024)    Hunger Vital Sign     Worried About Running Out of Food in the Last Year: Never true     Ran Out of Food in the Last Year: Never true   Transportation Needs: No Transportation Needs (2024)    PRAPARE - Transportation     Lack of Transportation (Medical): No     Lack of Transportation (Non-Medical): No   Physical Activity: Insufficiently Active (2024)    Exercise Vital Sign     Days of Exercise per Week: 4 days     Minutes of Exercise per Session: 20 min   Stress: Stress Concern Present (2024)    [place] [employer] - Occupational Stress Questionnaire     Feeling of Stress : To some extent   Social Connections: Moderately Integrated (2024)    Social Connection and Isolation Panel [NHANES]     Frequency of Communication with  Friends and Family: Three times a week     Frequency of Social Gatherings with Friends and Family: Once a week     Attends Faith Services: 1 to 4 times per year     Active Member of Clubs or Organizations: No     Attends Club or Organization Meetings: Never     Marital Status:    Housing Stability: Low Risk  (2/7/2024)    Housing Stability Vital Sign     Unable to Pay for Housing in the Last Year: No     Number of Places Lived in the Last Year: 1     Unstable Housing in the Last Year: No       Allergies:  Bee sting [allergen ext-venom-honey bee] and Grass pollen-bermuda, standard    Medications:    Current Outpatient Medications:     acetaminophen (TYLENOL) 500 MG tablet, Take 1 tablet (500 mg total) by mouth every 6 (six) hours as needed for Pain., Disp: 20 tablet, Rfl: 0    ferrous sulfate (FEOSOL) 325 mg (65 mg iron) Tab tablet, Take 1 tablet (325 mg total) by mouth 2 (two) times daily. (Patient not taking: Reported on 2/20/2024), Disp: 60 tablet, Rfl: 2    loratadine (CLARITIN) 10 mg tablet, Take 10 mg by mouth daily as needed for Allergies., Disp: , Rfl:     mirtazapine (REMERON) 30 MG tablet, Take 1 tablet (30 mg total) by mouth nightly., Disp: 30 tablet, Rfl: 11    ondansetron (ZOFRAN-ODT) 4 MG TbDL, Dissolve 1 tablet (4 mg total) by mouth every 8 (eight) hours as needed (nausea). (Patient not taking: Reported on 2/20/2024), Disp: 15 tablet, Rfl: 0    sodium bicarbonate 650 MG tablet, Take 2 tablets (1,300 mg total) by mouth 2 (two) times daily. (Patient not taking: Reported on 2/20/2024), Disp: 60 tablet, Rfl: 0    PHYSICAL EXAMINATION:  Physical Exam  Abdominal:      Comments: Staples removed without issue. Ostomy draining clear yellow urine.            Lab Results   Component Value Date    CREATININE 1.2 02/29/2024    EGFRNORACEVR 51.5 (A) 02/29/2024             IMPRESSION:    Encounter Diagnoses   Name Primary?    Small bowel anastomotic stricture Yes    Hydronephrosis with ureteral stricture      Presence of urostomy          Assessment:       1. Small bowel anastomotic stricture    2. Hydronephrosis with ureteral stricture    3. Presence of urostomy        Plan:   - Staples removed without issue.     RTC 3/15/2024 for 4 week post op with Dr. Balbuena    I spent 30 minutes with the patient of which more than half was spent in direct consultation with the patient in regards to our treatment and plan.  We addressed the office findings and recent labs.   Education and recommendations of today's plan of care including home remedies and needed follow up with PCP.   We discussed the chief complaint; reviewed the LUTS and the possible contributory factors.

## 2024-03-09 DIAGNOSIS — D63.8 ANEMIA OF CHRONIC DISEASE: ICD-10-CM

## 2024-03-12 RX ORDER — FERROUS SULFATE 325(65) MG
TABLET ORAL 2 TIMES DAILY
Qty: 60 TABLET | Refills: 2 | Status: SHIPPED | OUTPATIENT
Start: 2024-03-12

## 2024-03-15 ENCOUNTER — OFFICE VISIT (OUTPATIENT)
Dept: UROLOGY | Facility: CLINIC | Age: 59
End: 2024-03-15
Payer: MEDICAID

## 2024-03-15 ENCOUNTER — OFFICE VISIT (OUTPATIENT)
Dept: SURGERY | Facility: CLINIC | Age: 59
End: 2024-03-15
Payer: MEDICAID

## 2024-03-15 VITALS
HEART RATE: 93 BPM | SYSTOLIC BLOOD PRESSURE: 116 MMHG | HEIGHT: 69 IN | WEIGHT: 183.75 LBS | BODY MASS INDEX: 27.22 KG/M2 | DIASTOLIC BLOOD PRESSURE: 78 MMHG

## 2024-03-15 DIAGNOSIS — Z93.6 PRESENCE OF UROSTOMY: Primary | ICD-10-CM

## 2024-03-15 DIAGNOSIS — Z98.890 POST-OPERATIVE STATE: Primary | ICD-10-CM

## 2024-03-15 PROCEDURE — 99213 OFFICE O/P EST LOW 20 MIN: CPT | Mod: PBBFAC | Performed by: UROLOGY

## 2024-03-15 PROCEDURE — 3074F SYST BP LT 130 MM HG: CPT | Mod: CPTII,,, | Performed by: UROLOGY

## 2024-03-15 PROCEDURE — 1159F MED LIST DOCD IN RCRD: CPT | Mod: CPTII,,, | Performed by: UROLOGY

## 2024-03-15 PROCEDURE — 3078F DIAST BP <80 MM HG: CPT | Mod: CPTII,,, | Performed by: UROLOGY

## 2024-03-15 PROCEDURE — 99212 OFFICE O/P EST SF 10 MIN: CPT | Mod: PBBFAC,27 | Performed by: NURSE PRACTITIONER

## 2024-03-15 PROCEDURE — 99024 POSTOP FOLLOW-UP VISIT: CPT | Mod: S$PBB,,, | Performed by: NURSE PRACTITIONER

## 2024-03-15 PROCEDURE — 99024 POSTOP FOLLOW-UP VISIT: CPT | Mod: ,,, | Performed by: UROLOGY

## 2024-03-15 PROCEDURE — 99999 PR PBB SHADOW E&M-EST. PATIENT-LVL III: CPT | Mod: PBBFAC,,, | Performed by: UROLOGY

## 2024-03-15 PROCEDURE — 1160F RVW MEDS BY RX/DR IN RCRD: CPT | Mod: CPTII,,, | Performed by: NURSE PRACTITIONER

## 2024-03-15 PROCEDURE — 1159F MED LIST DOCD IN RCRD: CPT | Mod: CPTII,,, | Performed by: NURSE PRACTITIONER

## 2024-03-15 PROCEDURE — 99999 PR PBB SHADOW E&M-EST. PATIENT-LVL II: CPT | Mod: PBBFAC,,, | Performed by: NURSE PRACTITIONER

## 2024-03-15 PROCEDURE — 1111F DSCHRG MED/CURRENT MED MERGE: CPT | Mod: CPTII,,, | Performed by: NURSE PRACTITIONER

## 2024-03-15 NOTE — PLAN OF CARE
Plan of care reviewed with patient; verbalized understanding.   Medications reviewed and administered as ordered.  Rounding for safety and patient care per policy.   Safety precautions maintained. Call light within reach, bed wheels locked, bed in lowest position, side rails ^x2, safety maintained. NADN, Will continue monitor.    Problem: Adult Inpatient Plan of Care  Goal: Plan of Care Review  Flowsheets (Taken 10/8/2020 0617)  Plan of Care Reviewed With: patient     Problem: Fall Injury Risk  Goal: Absence of Fall and Fall-Related Injury  Intervention: Promote Injury-Free Environment  Flowsheets (Taken 10/8/2020 0617)  Safety Promotion/Fall Prevention:   assistive device/personal item within reach   lighting adjusted   medications reviewed   nonskid shoes/socks when out of bed   side rails raised x 2   supervised activity   toileting scheduled   instructed to call staff for mobility  Environmental Safety Modification:   assistive device/personal items within reach   clutter free environment maintained   lighting adjusted      No

## 2024-03-15 NOTE — PROGRESS NOTES
Ostomy Office Visit History and Physical    Referring Md:   No referring provider defined for this encounter.    SUBJECTIVE:     Chief Complaint: urostomy    History of Present Illness:  The patient is known patient to this practice.   Course is as follows:  Patient is a 59 y.o. female presents with trouble with urostomy  Appliance coming off of skin easily. Having to add additional tape.  Currently wearing coloplast light convex precut to 22 mm.  Changing daily vs qod.  Orders supplies from [facility]      Review of patient's allergies indicates:   Allergen Reactions    Bee sting [allergen ext-venom-honey bee]      Rash      Grass pollen-bermuda, standard      rash       Past Medical History:   Diagnosis Date    Abnormal mammogram 08/25/2020    Abnormal mammogram 08/25/2020    Acute blood loss anemia     Acute deep vein thrombosis (DVT) of lower extremity 12/09/2020    Advance care planning 04/30/2021    ODESSA (acute kidney injury) 03/21/2020    Anemia due to chronic blood loss     Anemia due to chronic kidney disease     Anemia due to chronic kidney disease 05/18/2020    Anxiety     Bilateral ureteral obstruction 09/11/2020    Bilious vomiting with nausea 06/11/2023    Cardiovascular event risk -- low 09/14/2015    ASCVD 10-year risk 1.9% (with optimal risk factors 1.3%) as of 9/14/2015     Cervical cancer 2014    Chronic back pain     Colostomy care     Deep vein thrombosis     Depression     Diarrhea due to malabsorption 11/14/2018    Difficult intubation     Discharge planning issues 04/30/2021    Disorder of kidney and ureter     DVT of lower extremity, bilateral 11/04/2020    Edema 04/10/2023    Emphysema of lung 04/10/2023    Fibromyalgia     Fungemia 09/27/2020    Generalized abdominal pain 08/25/2020    GERD (gastroesophageal reflux disease)     Hemifacial spasm 09/16/2015    Hiatal hernia 2014    History of cervical cancer 10/11/2018    History of essential hypertension 05/04/2015    Not requiring medications at  this time  BP is low- concern that this may correlate with increased stool output from stoma. Encourage her to contact GI and her PCP.    Hx of psychiatric care     Cymbalta, trazodone    Hypertension     Hypomagnesemia 11/21/2018    Hyponatremia 09/10/2023    Impaired functional mobility, balance, gait, and endurance 07/24/2015    Lactose intolerance     Major depressive disorder, recurrent episode, moderate 06/02/2023    Metastatic squamous cell carcinoma to lymph node 10/11/2018    Moderate episode of recurrent major depressive disorder 04/21/2021    Nephrostomy complication 10/26/2023    Neuropathy due to chemotherapeutic drug 11/14/2018    Osteoarthritis of back     Peritonitis 09/22/2020    Physical deconditioning 04/30/2021    Pseudomonas urinary tract infection 04/21/2021    Psychiatric problem     Pyelitis 09/09/2023    QT prolongation 04/16/2021    Refusal of blood transfusions as patient is Mandaen     Schatzki's ring 09/14/2015    Seen on outside EGD 05/2014, underwent esophageal dilatation. Bx were negative.     Seizure-like activity 12/02/2018    Seizures     Sleep stage dysfunction     Noted on PSG 06/2017; negative for obstructive sleep apnea     Stroke     Urinary tract infection associated with nephrostomy catheter 06/11/2020    Wound infection after surgery 09/24/2020     Past Surgical History:   Procedure Laterality Date    ANASTOMOSIS OF INTESTINE N/A 2/6/2024    Procedure: ANASTOMOSIS, INTESTINE - Ileocolic anastomosis;  Surgeon: Hammad Reynolds MD;  Location: [facility] OR [address] [place] [address];  Service: Colon and Rectal;  Laterality: N/A;    ANTEGRADE NEPHROSTOGRAPHY Bilateral 9/28/2020    Procedure: Nephrostogram - antegrade;  Surgeon: Leslie Balbuena MD;  Location: [facility] OR [address] [place] [address];  Service: Urology;  Laterality: Bilateral;    ANTEGRADE NEPHROSTOGRAPHY Left 4/20/2021    Procedure: NEPHROSTOGRAM, ANTEGRADE;  Surgeon: Leslie Balbuena MD;  Location: [facility] OR [address] [place] [address];  Service: Urology;  Laterality:  Left;    ANTEGRADE NEPHROSTOGRAPHY Right 10/27/2022    Procedure: NEPHROSTOGRAM, ANTEGRADE;  Surgeon: Chirag Russ MD;  Location: [facility] OR 1ST FLR;  Service: Urology;  Laterality: Right;    ANTEGRADE NEPHROSTOGRAPHY Right 4/21/2023    Procedure: NEPHROSTOGRAM, ANTEGRADE;  Surgeon: Chirag Russ MD;  Location: [facility] OR 2ND FLR;  Service: Urology;  Laterality: Right;    ANTEGRADE NEPHROSTOGRAPHY Left 6/6/2023    Procedure: Nephrostogram - antegrade;  Surgeon: Leslie Balbuena MD;  Location: [facility] OR 1ST FLR;  Service: Urology;  Laterality: Left;    BILATERAL OOPHORECTOMY  2015    CHOLECYSTECTOMY  11/09/2016    Done at [facility], path showed chronic cholecystitis and gallstones    cold knife conization  2014    COLECTOMY, RIGHT  9/17/2020    Procedure: COLECTOMY, RIGHT Extended;  Surgeon: Hammad Reynolds MD;  Location: [facility] OR 2ND FLR;  Service: Colon and Rectal;;    COLONOSCOPY  2014    COLONOSCOPY N/A 10/24/2016    at [facility], Dr Gutiérrez    COLONOSCOPY N/A 5/18/2018    Procedure: COLONOSCOPY;  Surgeon: Arden Gutiérrez MD;  Location: [facility] ENDO (4TH FLR);  Service: Endoscopy;  Laterality: N/A;    COLONOSCOPY N/A 7/28/2020    Procedure: COLONOSCOPY;  Surgeon: Hammad Reynolds MD;  Location: [facility] ENDO (4TH FLR);  Service: Colon and Rectal;  Laterality: N/A;  covid test [place] 7/25    CYSTOSCOPY WITH URETEROSCOPY, RETROGRADE PYELOGRAPHY, AND INSERTION OF STENT Bilateral 3/21/2020    Procedure: CYSTOSCOPY, WITH RETROGRADE PYELOGRAM,;  Surgeon: Leslie Balbuena MD;  Location: [facility] OR 1ST FLR;  Service: Urology;  Laterality: Bilateral;    DILATION OF NEPHROSTOMY TRACT Right 10/27/2022    Procedure: DILATION, NEPHROSTOMY TRACT;  Surgeon: Chirag Russ MD;  Location: [facility] OR 1ST FLR;  Service: Urology;  Laterality: Right;    ESOPHAGOGASTRODUODENOSCOPY  2014    ESOPHAGOGASTRODUODENOSCOPY  11/18/2020    ESOPHAGOGASTRODUODENOSCOPY N/A 11/18/2020    Procedure: ESOPHAGOGASTRODUODENOSCOPY (EGD);  Surgeon: Zenon MICHELLE  MD Tj;  Location: [facility] ENDO;  Service: Endoscopy;  Laterality: N/A;    ESOPHAGOGASTRODUODENOSCOPY N/A 12/11/2020    Procedure: EGD (ESOPHAGOGASTRODUODENOSCOPY);  Surgeon: Juancho Muse MD;  Location: [facility] ENDO (2ND FLR);  Service: Endoscopy;  Laterality: N/A;    EXCISION, SMALL INTESTINE  2/6/2024    Procedure: EXCISION, SMALL INTESTINE;  Surgeon: Hammad Reynolds MD;  Location: [facility] OR 2ND FLR;  Service: Colon and Rectal;;    HYSTERECTOMY  2014    [facility] for cervical cancer    ILEOSTOMY  9/17/2020    Procedure: CREATION, ILEOSTOMY;  Surgeon: Hammad Reynolds MD;  Location: [facility] OR 2ND FLR;  Service: Colon and Rectal;;    ILEOSTOMY CLOSURE N/A 2/6/2024    Procedure: CLOSURE, ILEOSTOMY;  Surgeon: Hammad Reynolds MD;  Location: [facility] OR 2ND FLR;  Service: Colon and Rectal;  Laterality: N/A;    LAPAROTOMY, EXPLORATORY N/A 2/6/2024    Procedure: OPEN LAPAROTOMY, EXPLORATORY;  Surgeon: Leslie Balbuena MD;  Location: [facility] OR 2ND FLR;  Service: Urology;  Laterality: N/A;  22 modifier    LOOPOGRAM N/A 4/20/2021    Procedure: LOOPOGRAM;  Surgeon: Leslie Balbuena MD;  Location: [facility] OR 1ST FLR;  Service: Urology;  Laterality: N/A;    LOOPOGRAM N/A 6/6/2023    Procedure: LOOPOGRAM;  Surgeon: Leslie Balbuena MD;  Location: [facility] OR 1ST FLR;  Service: Urology;  Laterality: N/A;    LYSIS OF ADHESIONS  2/6/2024    Procedure: LYSIS, ADHESIONS;  Surgeon: Leslie Balbuena MD;  Location: [facility] OR 2ND FLR;  Service: Urology;;    LYSIS OF ADHESIONS  2/6/2024    Procedure: LYSIS, ADHESIONS;  Surgeon: Hammad Reynolds MD;  [facility]: [facility] OR 2ND FLR;  Service: Colon and Rectal;;    MOBILIZATION OF SPLENIC FLEXURE N/A 9/11/2020    Procedure: MOBILIZATION, COLONIC;  Surgeon: Hammad Reynolds MD;  Location: [facility] OR 2ND FLR;  Service: Colon and Rectal;  Laterality: N/A;    NEPHROSTOGRAPHY Bilateral 4/17/2021    Procedure: Nephrostogram;  Surgeon: Celeste Surgeon;  Location: [facility];  Service: Anesthesiology;  Laterality: Bilateral;     OOPHORECTOMY      PERCUTANEOUS NEPHROLITHOTOMY Right 4/21/2023    Procedure: NEPHROLITHOTOMY, PERCUTANEOUS;  Surgeon: Chirag Russ MD;  [facility];  Service: Urology;  Laterality: Right;  2.5 hrs    PERCUTANEOUS NEPHROSTOMY  4/21/2023    Procedure: CREATION, NEPHROSTOMY, PERCUTANEOUS with removal of existing nephrostomy tube;  Surgeon: Chirag Russ MD;  [facility];  Service: Urology;;    PYELOSCOPY Right 10/27/2022    Procedure: PYELOSCOPY;  Surgeon: Chirag Russ MD;  [facility];  Service: Urology;  Laterality: Right;    REPLACEMENT OF NEPHROSTOMY TUBE Right 10/27/2022    Procedure: REPLACEMENT, NEPHROSTOMY TUBE;  Surgeon: Chirag Russ MD;  [facility];  Service: Urology;  Laterality: Right;    RETROPERITONEAL LYMPHADENECTOMY Right 9/17/2018    Procedure: LYMPHADENECTOMY, RETROPERITONEUM;  Surgeon: Sebas Reed MD;  [facility];  Service: General;  Laterality: Right;  ILIAC    REVISION OF ANASTOMOSIS OF URETER TO ILEAL CONDUIT N/A 2/6/2024    Procedure: REVISION, ANASTOMOSIS, URETEROILEAL;  Surgeon: Leslie Balbuena MD;  [facility];  Service: Urology;  Laterality: N/A;    URETEROSCOPIC REMOVAL OF URETERIC CALCULUS Right 10/27/2022    Procedure: REMOVAL, CALCULUS, URETER, URETEROSCOPIC;  Surgeon: Chirag Russ MD;  Location: [facility] OR [address] [place] [address];  Service: Urology;  Laterality: Right;    URETEROSCOPY Right 10/27/2022    Procedure: URETEROSCOPY-ANTEGRADE;  Surgeon: Chirag Russ MD;  Location: [facility] OR [address] [place] [address];  Service: Urology;  Laterality: Right;     Family History   Problem Relation Age of Onset    Heart disease Sister     Heart disease Maternal Grandmother     Colon cancer Father     Esophageal cancer Father     Cancer Father         Lung-smoker    Cancer Mother         Cervical    Cervical cancer Mother     Breast cancer Maternal Aunt     Suicide Daughter         jumped from parking  structure    Drug abuse Daughter     Drug abuse Daughter     Rectal cancer Neg Hx     Stomach cancer Neg Hx     Crohn's disease Neg Hx     Ulcerative colitis Neg Hx     Diabetes Neg Hx     Hypertension Neg Hx      Social History     Tobacco Use    Smoking status: Never    Smokeless tobacco: Never   Substance Use Topics    Alcohol use: No     Alcohol/week: 0.0 standard drinks of alcohol    Drug use: No        Review of Systems:  Review of Systems   Genitourinary:         Urostomy in place         OBJECTIVE:     Vital Signs (Most Recent)  Last Menstrual Period 06/08/2014 (Approximate)     Physical Exam:  General: Black or  female in no distress   Neuro: Alert and oriented to person, place, and time.  Moves all extremities.     HEENT: No icterus.  Trachea midline  Respiratory: Respirations are even and unlabored, no cough or audible wheezing  Abdomen:     3/15/24    Labs reviewed today:  Lab Results   Component Value Date    WBC 7.04 02/25/2024    HGB 9.9 (L) 02/25/2024    HCT 33.6 (L) 02/25/2024     (H) 02/25/2024    CHOL 186 10/20/2022    TRIG 232 (H) 02/09/2024    HDL 77 (H) 10/20/2022    ALT 30 02/25/2024    AST 51 (H) 02/25/2024     02/29/2024    K 3.7 02/29/2024     02/29/2024    CREATININE 1.2 02/29/2024    BUN 14 02/29/2024    CO2 20 (L) 02/29/2024    TSH 0.527 12/02/2018    INR 1.0 02/25/2024    GLUF 94 09/20/2016    HGBA1C 4.9 02/04/2021         ASSESSMENT/PLAN:     There are no diagnoses linked to this encounter.        Patient instructed to do the following routine for pouching:  Cleanse skin with water, dry well.  Apply skin barrier film. Allow to dry  Pull up/down to round out stoma  Apply pouch sized appropriately for stoma. 25 mm.   Pt counseled on DME suppliers for  ostomy pouches. OHME  Placed in vale today and she was happy with this and will likely change to vale  Pt also counseled on skin care, nutrition, hydration and ADL.  I spent greater than 50% of  this 30 minute visit in face to face counseling.  Return clinic visit recommended [facility]OL[facility] Ramsay  Colon and Rectal Surgery

## 2024-03-15 NOTE — PROGRESS NOTES
CHIEF COMPLAINT:    Mrs. Riley is a 59 y.o. female presenting for a post op visit.      PRESENTING ILLNESS:    Edita Riley is a 59 y.o. female who has a history of pelvic irradiation with ureteral strictures bilaterally.  History of colo-colonic anastomotic leak and peritonitis following colonic urinary conduit creation, s/p extended right colectomy with end ileostomy and left ureter-colonic conduit anastomotic stricture.  ft ureteral re implant to a transverse colon conduit on 2/6/2024, lysis of adhesions, takedown of ileostomy with ileosigmoid anastamosis.    She states she feels well. Has been having regular bowel movements. No pain.  There is good urine output from the stoma.  However, she has issues pouching the stoma.  It may be due to the abdominal roll and the flange may be too stiff.      Staples are out, the ureteral stent fell out and the drain and tube in the transverse colon conduit were removed before she was discharged from the hospital.    Past Surgical History:   Procedure Laterality Date    ANASTOMOSIS OF INTESTINE N/A 2/6/2024    Procedure: ANASTOMOSIS, INTESTINE - Ileocolic anastomosis;  Surgeon: Hammad Reynolds MD;  Location: [facility] OR [address] [place] [address];  Service: Colon and Rectal;  Laterality: N/A;    ANTEGRADE NEPHROSTOGRAPHY Bilateral 9/28/2020    Procedure: Nephrostogram - antegrade;  Surgeon: Leslie Balbuena MD;  [facility]  Service: Urology;  Laterality: Bilateral;    ANTEGRADE NEPHROSTOGRAPHY Left 4/20/2021    Procedure: NEPHROSTOGRAM, ANTEGRADE;  Surgeon: Leslie Balbuena MD;  Location: [facility] OR [facility] FLR;  Service: Urology;  Laterality: Left;    ANTEGRADE NEPHROSTOGRAPHY Right 10/27/2022    Procedure: NEPHROSTOGRAM, ANTEGRADE;  Surgeon: Chirag Russ MD;  Location: [facility] OR [facility] FLR;  Service: Urology;  Laterality: Right;    ANTEGRADE NEPHROSTOGRAPHY Right 4/21/2023    Procedure: NEPHROSTOGRAM, ANTEGRADE;  Surgeon: Chirag Russ MD;  Location: [facility] OR [facility]  FLR;  Service: Urology;  Laterality: Right;    ANTEGRADE NEPHROSTOGRAPHY Left 6/6/2023    Procedure: Nephrostogram - antegrade;  Surgeon: Leslie Balbuena MD;  Location: [facility] OR 1ST FLR;  Service: Urology;  Laterality: Left;    BILATERAL OOPHORECTOMY  2015    CHOLECYSTECTOMY  11/09/2016    Done at [facility], path showed chronic cholecystitis and gallstones    cold knife conization  2014    COLECTOMY, RIGHT  9/17/2020    Procedure: COLECTOMY, RIGHT Extended;  Surgeon: Hammad Reynolds MD;  Location: [facility] OR 2ND FLR;  Service: Colon and Rectal;;    COLONOSCOPY  2014    COLONOSCOPY N/A 10/24/2016    at [facility], Dr Gutiérrez    COLONOSCOPY N/A 5/18/2018    Procedure: COLONOSCOPY;  Surgeon: Arden Gutiérrez MD;  Location: [facility] (4TH FLR);  Service: Endoscopy;  Laterality: N/A;    COLONOSCOPY N/A 7/28/2020    Procedure: COLONOSCOPY;  Surgeon: Hammad Reynolds MD;  Location: [facility] (4TH FLR);  Service: Colon and Rectal;  Laterality: N/A;  covid test [place] 7/25    CYSTOSCOPY WITH URETEROSCOPY, RETROGRADE PYELOGRAPHY, AND INSERTION OF STENT Bilateral 3/21/2020    Procedure: CYSTOSCOPY, WITH RETROGRADE PYELOGRAM,;  Surgeon: Leslie Balbuena MD;  [facility];  Service: Urology;  Laterality: Bilateral;    DILATION OF NEPHROSTOMY TRACT Right 10/27/2022    Procedure: DILATION, NEPHROSTOMY TRACT;  Surgeon: Chirag Russ MD;  [facility];  Service: Urology;  Laterality: Right;    ESOPHAGOGASTRODUODENOSCOPY  2014    ESOPHAGOGASTRODUODENOSCOPY  11/18/2020    ESOPHAGOGASTRODUODENOSCOPY N/A 11/18/2020    Procedure: ESOPHAGOGASTRODUODENOSCOPY (EGD);  Surgeon: Zenon Spencer MD;  Location[facility];  Service: Endoscopy;  Laterality: N/A;    ESOPHAGOGASTRODUODENOSCOPY N/A 12/11/2020    Procedure: EGD (ESOPHAGOGASTRODUODENOSCOPY);  Surgeon: Juancho Muse MD;  Location: [facility] (2ND FLR);  Service: Endoscopy;  Laterality: N/A;    EXCISION, SMALL INTESTINE  2/6/2024    Procedure: EXCISION,  SMALL INTESTINE;  Surgeon: Hammad Reynolds MD;  [facility] OR 2ND FLR;  Service: Colon and Rectal;;    HYSTERECTOMY  2014    [facility] for cervical cancer    ILEOSTOMY  9/17/2020    Procedure: CREATION, ILEOSTOMY;  Surgeon: Hammad Reynolds MD;  Location: [facility] OR 2ND FLR;  Service: Colon and Rectal;;    ILEOSTOMY CLOSURE N/A 2/6/2024    Procedure: CLOSURE, ILEOSTOMY;  Surgeon: Hammad Reynolds MD;  [facility] OR 2ND FLR;  Service: Colon and Rectal;  Laterality: N/A;    LAPAROTOMY, EXPLORATORY N/A 2/6/2024    Procedure: OPEN LAPAROTOMY, EXPLORATORY;  Surgeon: Leslie Balbuena MD;  Location: [facility] OR 2ND FLR;  Service: Urology;  Laterality: N/A;  22 modifier    LOOPOGRAM N/A 4/20/2021    Procedure: LOOPOGRAM;  Surgeon: Leslie Balbuena MD;  Location[facility] OR 1ST FLR;  Service: Urology;  Laterality: N/A;    LOOPOGRAM N/A 6/6/2023    Procedure: LOOPOGRAM;  Surgeon: Leslie Balbuena MD;  Location: [facility] OR 1ST FLR;  Service: Urology;  Laterality: N/A;    LYSIS OF ADHESIONS  2/6/2024    Procedure: LYSIS, ADHESIONS;  Surgeon: Leslie Balbuena MD;  Location[facility] OR 2ND FLR;  Service: Urology;;    LYSIS OF ADHESIONS  2/6/2024    Procedure: LYSIS, ADHESIONS;  Surgeon: Hammad Reynolds MD;  [facility] 2ND FLR;  Service: Colon and Rectal;;    MOBILIZATION OF SPLENIC FLEXURE N/A 9/11/2020    Procedure: MOBILIZATION, COLONIC;  Surgeon: Hammad Reynolds MD;  [facility]: [facility] OR 2ND FLR;  Service: Colon and Rectal;  Laterality: N/A;    NEPHROSTOGRAPHY Bilateral 4/17/2021    Procedure: Nephrostogram;  Surgeon: Celeste Surgeon;  Location: [facility];  Service: Anesthesiology;  Laterality: Bilateral;    OOPHORECTOMY      PERCUTANEOUS NEPHROLITHOTOMY Right 4/21/2023    Procedure: NEPHROLITHOTOMY, PERCUTANEOUS;  Surgeon: Chirag Russ MD;  Location: [facility] OR 2ND FLR;  Service: Urology;  Laterality: Right;  2.5 hrs    PERCUTANEOUS NEPHROSTOMY  4/21/2023    Procedure: CREATION, NEPHROSTOMY, PERCUTANEOUS with removal of existing  nephrostomy tube;  Surgeon: Chirag Russ MD;  Location: [facility] OR 2ND FLR;  Service: Urology;;    PYELOSCOPY Right 10/27/2022    Procedure: PYELOSCOPY;  Surgeon: Chirag Russ MD;  Location: [facility] OR 1ST FLR;  Service: Urology;  Laterality: Right;    REPLACEMENT OF NEPHROSTOMY TUBE Right 10/27/2022    Procedure: REPLACEMENT, NEPHROSTOMY TUBE;  Surgeon: Chirag Russ MD;  Location: [facility] OR 1ST FLR;  Service: Urology;  Laterality: Right;    RETROPERITONEAL LYMPHADENECTOMY Right 9/17/2018    Procedure: LYMPHADENECTOMY, RETROPERITONEUM;  Surgeon: Sebas Reed MD;  Location: [facility] OR 2ND FLR;  Service: General;  Laterality: Right;  ILIAC    REVISION OF ANASTOMOSIS OF URETER TO ILEAL CONDUIT N/A 2/6/2024    Procedure: REVISION, ANASTOMOSIS, URETEROILEAL;  Surgeon: Leslie Balbuena MD;  [facility];  Service: Urology;  Laterality: N/A;    URETEROSCOPIC REMOVAL OF URETERIC CALCULUS Right 10/27/2022    Procedure: REMOVAL, CALCULUS, URETER, URETEROSCOPIC;  Surgeon: Chirag Russ MD;  [facility];  Service: Urology;  Laterality: Right;    URETEROSCOPY Right 10/27/2022    Procedure: URETEROSCOPY-ANTEGRADE;  Surgeon: Chirag Russ MD;  [facility];  Service: Urology;  Laterality: Right;       Allergies:  Bee sting [allergen ext-venom-honey bee] and Grass pollen-bermuda, standard    Medications:  Outpatient Encounter Medications as of 3/15/2024   Medication Sig Dispense Refill    acetaminophen (TYLENOL) 500 MG tablet Take 1 tablet (500 mg total) by mouth every 6 (six) hours as needed for Pain. 20 tablet 0    ferrous sulfate (FEOSOL) 325 mg (65 mg iron) Tab tablet TAKE 1 TABLET BY MOUTH TWICE A DAY 60 tablet 2    loratadine (CLARITIN) 10 mg tablet Take 10 mg by mouth daily as needed for Allergies.      mirtazapine (REMERON) 30 MG tablet Take 1 tablet (30 mg total) by mouth nightly. 30 tablet 11    ondansetron (ZOFRAN-ODT) 4 MG TbDL Dissolve 1 tablet (4 mg  total) by mouth every 8 (eight) hours as needed (nausea). (Patient not taking: Reported on 2024) 15 tablet 0    sodium bicarbonate 650 MG tablet Take 2 tablets (1,300 mg total) by mouth 2 (two) times daily. (Patient not taking: Reported on 2024) 60 tablet 0    [] sulfamethoxazole-trimethoprim 400-80mg (BACTRIM,SEPTRA) 400-80 mg per tablet Take 1 tablet by mouth once daily. for 14 doses (Patient not taking: Reported on 2024) 14 tablet 0    [DISCONTINUED] ferrous sulfate (FEOSOL) 325 mg (65 mg iron) Tab tablet Take 1 tablet (325 mg total) by mouth 2 (two) times daily. (Patient not taking: Reported on 2024) 60 tablet 2     No facility-administered encounter medications on file as of 3/15/2024.         PHYSICAL EXAMINATION:    The patient generally appears in good health, is appropriately interactive, and is in no apparent distress.    Skin: No lesions.    Mental: Cooperative with normal affect.    Neuro: Grossly intact.    HEENT: Normal. No evidence of lymphadenopathy.    Chest:  normal inspiratory effort.    Abdomen:  Soft, non-tender. No masses or organomegaly. Bladder is not palpable. No evidence of flank discomfort. The flange is coming off of the skin on the top portion.     Extremities: No clubbing, cyanosis, or edema    Cr 2024 was 1.2    IMPRESSION:    Post op state    PLAN:    1.  Saw Addie Mathews and she agreed to see the patient.   2.  Follow up with me in 3 months.

## 2024-03-27 ENCOUNTER — TELEPHONE (OUTPATIENT)
Dept: INTERVENTIONAL RADIOLOGY/VASCULAR | Facility: CLINIC | Age: 59
End: 2024-03-27
Payer: MEDICAID

## 2024-04-05 ENCOUNTER — TELEPHONE (OUTPATIENT)
Dept: INTERVENTIONAL RADIOLOGY/VASCULAR | Facility: CLINIC | Age: 59
End: 2024-04-05
Payer: MEDICAID

## 2024-05-13 PROBLEM — N39.0 UTI (URINARY TRACT INFECTION): Status: RESOLVED | Noted: 2023-02-16 | Resolved: 2024-05-13

## 2024-08-07 ENCOUNTER — HOSPITAL ENCOUNTER (INPATIENT)
Facility: HOSPITAL | Age: 59
LOS: 5 days | Discharge: HOME | DRG: 694 | End: 2024-08-12
Attending: EMERGENCY MEDICINE | Admitting: EMERGENCY MEDICINE
Payer: MEDICAID

## 2024-08-07 DIAGNOSIS — N17.9 ACUTE KIDNEY INJURY: ICD-10-CM

## 2024-08-07 DIAGNOSIS — N18.9 ANEMIA IN CHRONIC KIDNEY DISEASE, UNSPECIFIED CKD STAGE: ICD-10-CM

## 2024-08-07 DIAGNOSIS — M19.90 ARTHRITIS: ICD-10-CM

## 2024-08-07 DIAGNOSIS — D63.1 ANEMIA IN CHRONIC KIDNEY DISEASE, UNSPECIFIED CKD STAGE: ICD-10-CM

## 2024-08-07 DIAGNOSIS — G43.009 MIGRAINE WITHOUT AURA AND WITHOUT STATUS MIGRAINOSUS, NOT INTRACTABLE: ICD-10-CM

## 2024-08-07 DIAGNOSIS — Z85.41 HISTORY OF CERVICAL CANCER: Chronic | ICD-10-CM

## 2024-08-07 DIAGNOSIS — N13.30 HYDRONEPHROSIS, UNSPECIFIED HYDRONEPHROSIS TYPE: Primary | ICD-10-CM

## 2024-08-07 LAB
ALBUMIN SERPL BCP-MCNC: 3.3 G/DL (ref 3.5–5.2)
ALP SERPL-CCNC: 122 U/L (ref 55–135)
ALT SERPL W/O P-5'-P-CCNC: 29 U/L (ref 10–44)
ANION GAP SERPL CALC-SCNC: 11 MMOL/L (ref 8–16)
APTT PPP: 33.6 SEC (ref 21–32)
AST SERPL-CCNC: 31 U/L (ref 10–40)
BACTERIA #/AREA URNS AUTO: ABNORMAL /HPF
BASOPHILS # BLD AUTO: 0.06 K/UL (ref 0–0.2)
BASOPHILS NFR BLD: 0.5 % (ref 0–1.9)
BILIRUB SERPL-MCNC: 0.4 MG/DL (ref 0.1–1)
BILIRUB UR QL STRIP: NEGATIVE
BUN SERPL-MCNC: 16 MG/DL (ref 8–23)
BUN SERPL-MCNC: 22 MG/DL (ref 6–30)
CALCIUM SERPL-MCNC: 9.8 MG/DL (ref 8.7–10.5)
CHLORIDE SERPL-SCNC: 110 MMOL/L (ref 95–110)
CHLORIDE SERPL-SCNC: 112 MMOL/L (ref 95–110)
CLARITY UR REFRACT.AUTO: ABNORMAL
CO2 SERPL-SCNC: 18 MMOL/L (ref 23–29)
COLOR UR AUTO: YELLOW
CREAT SERPL-MCNC: 2 MG/DL (ref 0.5–1.4)
CREAT SERPL-MCNC: 2 MG/DL (ref 0.5–1.4)
DIFFERENTIAL METHOD BLD: ABNORMAL
EOSINOPHIL # BLD AUTO: 0.1 K/UL (ref 0–0.5)
EOSINOPHIL NFR BLD: 0.6 % (ref 0–8)
ERYTHROCYTE [DISTWIDTH] IN BLOOD BY AUTOMATED COUNT: 15.3 % (ref 11.5–14.5)
EST. GFR  (NO RACE VARIABLE): 27.9 ML/MIN/1.73 M^2
GLUCOSE SERPL-MCNC: 103 MG/DL (ref 70–110)
GLUCOSE SERPL-MCNC: 108 MG/DL (ref 70–110)
GLUCOSE UR QL STRIP: NEGATIVE
HCT VFR BLD AUTO: 40.7 % (ref 37–48.5)
HCT VFR BLD CALC: 39 %PCV (ref 36–54)
HGB BLD-MCNC: 11.9 G/DL (ref 12–16)
HGB UR QL STRIP: ABNORMAL
HYALINE CASTS UR QL AUTO: 0 /LPF
IMM GRANULOCYTES # BLD AUTO: 0.04 K/UL (ref 0–0.04)
IMM GRANULOCYTES NFR BLD AUTO: 0.3 % (ref 0–0.5)
INR PPP: 1 (ref 0.8–1.2)
KETONES UR QL STRIP: NEGATIVE
LEUKOCYTE ESTERASE UR QL STRIP: ABNORMAL
LYMPHOCYTES # BLD AUTO: 1.3 K/UL (ref 1–4.8)
LYMPHOCYTES NFR BLD: 10.7 % (ref 18–48)
MCH RBC QN AUTO: 26.6 PG (ref 27–31)
MCHC RBC AUTO-ENTMCNC: 29.2 G/DL (ref 32–36)
MCV RBC AUTO: 91 FL (ref 82–98)
MICROSCOPIC COMMENT: ABNORMAL
MONOCYTES # BLD AUTO: 1 K/UL (ref 0.3–1)
MONOCYTES NFR BLD: 8.7 % (ref 4–15)
NEUTROPHILS # BLD AUTO: 9.2 K/UL (ref 1.8–7.7)
NEUTROPHILS NFR BLD: 79.2 % (ref 38–73)
NITRITE UR QL STRIP: POSITIVE
NRBC BLD-RTO: 0 /100 WBC
PH UR STRIP: 8 [PH] (ref 5–8)
PLATELET # BLD AUTO: 288 K/UL (ref 150–450)
PMV BLD AUTO: 12 FL (ref 9.2–12.9)
POC IONIZED CALCIUM: 1.17 MMOL/L (ref 1.06–1.42)
POC TCO2 (MEASURED): 22 MMOL/L (ref 23–29)
POTASSIUM BLD-SCNC: 4.3 MMOL/L (ref 3.5–5.1)
POTASSIUM SERPL-SCNC: 4.2 MMOL/L (ref 3.5–5.1)
PROT SERPL-MCNC: 8.5 G/DL (ref 6–8.4)
PROT UR QL STRIP: ABNORMAL
PROTHROMBIN TIME: 11 SEC (ref 9–12.5)
RBC # BLD AUTO: 4.47 M/UL (ref 4–5.4)
RBC #/AREA URNS AUTO: 4 /HPF (ref 0–4)
SAMPLE: ABNORMAL
SODIUM BLD-SCNC: 142 MMOL/L (ref 136–145)
SODIUM SERPL-SCNC: 141 MMOL/L (ref 136–145)
SP GR UR STRIP: 1.01 (ref 1–1.03)
SQUAMOUS #/AREA URNS AUTO: 0 /HPF
URN SPEC COLLECT METH UR: ABNORMAL
WBC # BLD AUTO: 11.66 K/UL (ref 3.9–12.7)
WBC #/AREA URNS AUTO: 61 /HPF (ref 0–5)

## 2024-08-07 PROCEDURE — 85025 COMPLETE CBC W/AUTO DIFF WBC: CPT | Performed by: EMERGENCY MEDICINE

## 2024-08-07 PROCEDURE — 85730 THROMBOPLASTIN TIME PARTIAL: CPT

## 2024-08-07 PROCEDURE — 25000003 PHARM REV CODE 250: Performed by: EMERGENCY MEDICINE

## 2024-08-07 PROCEDURE — 96375 TX/PRO/DX INJ NEW DRUG ADDON: CPT

## 2024-08-07 PROCEDURE — 81001 URINALYSIS AUTO W/SCOPE: CPT | Performed by: EMERGENCY MEDICINE

## 2024-08-07 PROCEDURE — 99285 EMERGENCY DEPT VISIT HI MDM: CPT | Mod: 25

## 2024-08-07 PROCEDURE — 80053 COMPREHEN METABOLIC PANEL: CPT | Performed by: EMERGENCY MEDICINE

## 2024-08-07 PROCEDURE — 85610 PROTHROMBIN TIME: CPT

## 2024-08-07 PROCEDURE — 87086 URINE CULTURE/COLONY COUNT: CPT | Performed by: EMERGENCY MEDICINE

## 2024-08-07 PROCEDURE — 63600175 PHARM REV CODE 636 W HCPCS: Performed by: EMERGENCY MEDICINE

## 2024-08-07 PROCEDURE — 20600001 HC STEP DOWN PRIVATE ROOM

## 2024-08-07 PROCEDURE — 96376 TX/PRO/DX INJ SAME DRUG ADON: CPT

## 2024-08-07 PROCEDURE — 96361 HYDRATE IV INFUSION ADD-ON: CPT

## 2024-08-07 PROCEDURE — 96365 THER/PROPH/DIAG IV INF INIT: CPT

## 2024-08-07 RX ORDER — AMOXICILLIN 250 MG
1 CAPSULE ORAL 2 TIMES DAILY
Status: DISCONTINUED | OUTPATIENT
Start: 2024-08-08 | End: 2024-08-12 | Stop reason: HOSPADM

## 2024-08-07 RX ORDER — HYDROMORPHONE HYDROCHLORIDE 1 MG/ML
1 INJECTION, SOLUTION INTRAMUSCULAR; INTRAVENOUS; SUBCUTANEOUS EVERY 4 HOURS PRN
Status: DISCONTINUED | OUTPATIENT
Start: 2024-08-08 | End: 2024-08-11

## 2024-08-07 RX ORDER — ONDANSETRON 8 MG/1
8 TABLET, ORALLY DISINTEGRATING ORAL EVERY 8 HOURS PRN
Status: DISCONTINUED | OUTPATIENT
Start: 2024-08-08 | End: 2024-08-12 | Stop reason: HOSPADM

## 2024-08-07 RX ORDER — TRAMADOL HYDROCHLORIDE 50 MG/1
50 TABLET ORAL EVERY 6 HOURS PRN
Status: DISCONTINUED | OUTPATIENT
Start: 2024-08-08 | End: 2024-08-12 | Stop reason: HOSPADM

## 2024-08-07 RX ORDER — ONDANSETRON HYDROCHLORIDE 2 MG/ML
4 INJECTION, SOLUTION INTRAVENOUS EVERY 8 HOURS PRN
Status: DISCONTINUED | OUTPATIENT
Start: 2024-08-08 | End: 2024-08-11

## 2024-08-07 RX ORDER — LANOLIN ALCOHOL/MO/W.PET/CERES
1 CREAM (GRAM) TOPICAL DAILY
Status: DISCONTINUED | OUTPATIENT
Start: 2024-08-08 | End: 2024-08-10

## 2024-08-07 RX ORDER — ONDANSETRON HYDROCHLORIDE 2 MG/ML
4 INJECTION, SOLUTION INTRAVENOUS
Status: COMPLETED | OUTPATIENT
Start: 2024-08-07 | End: 2024-08-07

## 2024-08-07 RX ORDER — SODIUM CHLORIDE 9 MG/ML
INJECTION, SOLUTION INTRAVENOUS CONTINUOUS
Status: DISCONTINUED | OUTPATIENT
Start: 2024-08-08 | End: 2024-08-10

## 2024-08-07 RX ORDER — MORPHINE SULFATE 2 MG/ML
6 INJECTION, SOLUTION INTRAMUSCULAR; INTRAVENOUS
Status: COMPLETED | OUTPATIENT
Start: 2024-08-07 | End: 2024-08-07

## 2024-08-07 RX ORDER — POLYETHYLENE GLYCOL 3350 17 G/17G
17 POWDER, FOR SOLUTION ORAL 2 TIMES DAILY PRN
Status: DISCONTINUED | OUTPATIENT
Start: 2024-08-08 | End: 2024-08-07

## 2024-08-07 RX ORDER — ENOXAPARIN SODIUM 100 MG/ML
30 INJECTION SUBCUTANEOUS EVERY 24 HOURS
Status: DISCONTINUED | OUTPATIENT
Start: 2024-08-08 | End: 2024-08-08

## 2024-08-07 RX ORDER — SODIUM CHLORIDE 0.9 % (FLUSH) 0.9 %
10 SYRINGE (ML) INJECTION
Status: DISCONTINUED | OUTPATIENT
Start: 2024-08-08 | End: 2024-08-12 | Stop reason: HOSPADM

## 2024-08-07 RX ORDER — MORPHINE SULFATE 4 MG/ML
8 INJECTION, SOLUTION INTRAMUSCULAR; INTRAVENOUS
Status: COMPLETED | OUTPATIENT
Start: 2024-08-07 | End: 2024-08-07

## 2024-08-07 RX ORDER — ACETAMINOPHEN 325 MG/1
650 TABLET ORAL EVERY 4 HOURS PRN
Status: DISCONTINUED | OUTPATIENT
Start: 2024-08-08 | End: 2024-08-12 | Stop reason: HOSPADM

## 2024-08-07 RX ORDER — TALC
6 POWDER (GRAM) TOPICAL NIGHTLY PRN
Status: DISCONTINUED | OUTPATIENT
Start: 2024-08-08 | End: 2024-08-12 | Stop reason: HOSPADM

## 2024-08-07 RX ORDER — MIRTAZAPINE 15 MG/1
30 TABLET, FILM COATED ORAL NIGHTLY
Status: DISCONTINUED | OUTPATIENT
Start: 2024-08-07 | End: 2024-08-12 | Stop reason: HOSPADM

## 2024-08-07 RX ADMIN — SODIUM CHLORIDE 1000 ML: 9 INJECTION, SOLUTION INTRAVENOUS at 05:08

## 2024-08-07 RX ADMIN — CEFEPIME 2 G: 2 INJECTION, POWDER, FOR SOLUTION INTRAVENOUS at 07:08

## 2024-08-07 RX ADMIN — MORPHINE SULFATE 6 MG: 2 INJECTION, SOLUTION INTRAMUSCULAR; INTRAVENOUS at 08:08

## 2024-08-07 RX ADMIN — ONDANSETRON 4 MG: 2 INJECTION INTRAMUSCULAR; INTRAVENOUS at 05:08

## 2024-08-07 RX ADMIN — MORPHINE SULFATE 8 MG: 4 INJECTION INTRAVENOUS at 05:08

## 2024-08-07 RX ADMIN — SODIUM CHLORIDE: 9 INJECTION, SOLUTION INTRAVENOUS at 11:08

## 2024-08-07 NOTE — Clinical Note
Right: Back.   Scrubbed with ChloroPrep With Tint.    Hair: N/A.  Prepped by: Evette Bird RT 8/8/2024 3:16 PM.

## 2024-08-07 NOTE — ED PROVIDER NOTES
Encounter Date: 8/7/2024       History     Chief Complaint   Patient presents with    Abdominal Pain     Right flank pain radiating to her back with vomiting     59-year-old female with history of kidney stones, recent colostomy reversal, bowel obstruction presents with the abrupt onset of right flank pain with epigastric pain and vomiting that started a few hours ago.  Feels like her prior kidney stones.  She is passing gas and having bowel movements normally.  Denies any fever or dysuria        Review of patient's allergies indicates:   Allergen Reactions    Bee sting [allergen ext-venom-honey bee]      Rash      Grass pollen-bermuda, standard      rash     Past Medical History:   Diagnosis Date    Abnormal mammogram 08/25/2020    Abnormal mammogram 08/25/2020    Acute blood loss anemia     Acute deep vein thrombosis (DVT) of lower extremity 12/09/2020    Advance care planning 04/30/2021    ODESSA (acute kidney injury) 03/21/2020    Anemia due to chronic blood loss     Anemia due to chronic kidney disease     Anemia due to chronic kidney disease 05/18/2020    Anxiety     Bilateral ureteral obstruction 09/11/2020    Bilious vomiting with nausea 06/11/2023    Cardiovascular event risk -- low 09/14/2015    ASCVD 10-year risk 1.9% (with optimal risk factors 1.3%) as of 9/14/2015     Cervical cancer 2014    Chronic back pain     Colostomy care     Deep vein thrombosis     Depression     Diarrhea due to malabsorption 11/14/2018    Difficult intubation     Discharge planning issues 04/30/2021    Disorder of kidney and ureter     DVT of lower extremity, bilateral 11/04/2020    Edema 04/10/2023    Emphysema of lung 04/10/2023    Fibromyalgia     Fungemia 09/27/2020    Generalized abdominal pain 08/25/2020    GERD (gastroesophageal reflux disease)     Hemifacial spasm 09/16/2015    Hiatal hernia 2014    History of cervical cancer 10/11/2018    History of essential hypertension 05/04/2015    Not requiring medications at this  time  BP is low- concern that this may correlate with increased stool output from stoma. Encourage her to contact GI and her PCP.    Hx of psychiatric care     Cymbalta, trazodone    Hypertension     Hypomagnesemia 11/21/2018    Hyponatremia 09/10/2023    Impaired functional mobility, balance, gait, and endurance 07/24/2015    Lactose intolerance     Major depressive disorder, recurrent episode, moderate 06/02/2023    Metastatic squamous cell carcinoma to lymph node 10/11/2018    Moderate episode of recurrent major depressive disorder 04/21/2021    Nephrostomy complication 10/26/2023    Neuropathy due to chemotherapeutic drug 11/14/2018    Osteoarthritis of back     Peritonitis 09/22/2020    Physical deconditioning 04/30/2021    Pseudomonas urinary tract infection 04/21/2021    Psychiatric problem     Pyelitis 09/09/2023    QT prolongation 04/16/2021    Refusal of blood transfusions as patient is [employer]     Schatzki's ring 09/14/2015    Seen on outside EGD 05/2014, underwent esophageal dilatation. Bx were negative.     Seizure-like activity 12/02/2018    Seizures     Sleep stage dysfunction     Noted on PSG 06/2017; negative for obstructive sleep apnea     Stroke     Urinary tract infection associated with nephrostomy catheter 06/11/2020    Wound infection after surgery 09/24/2020     Past Surgical History:   Procedure Laterality Date    ANASTOMOSIS OF INTESTINE N/A 2/6/2024    Procedure: ANASTOMOSIS, INTESTINE - Ileocolic anastomosis;  Surgeon: Hammad Reynolds MD;  Location: [facility] OR [address] [place] [address];  Service: Colon and Rectal;  Laterality: N/A;    ANTEGRADE NEPHROSTOGRAPHY Bilateral 9/28/2020    Procedure: Nephrostogram - antegrade;  Surgeon: Leslie Balbuena MD;  Location: [facility] OR [address] [place] [address];  Service: Urology;  Laterality: Bilateral;    ANTEGRADE NEPHROSTOGRAPHY Left 4/20/2021    Procedure: NEPHROSTOGRAM, ANTEGRADE;  Surgeon: Leslie Balbuena MD;  Location: [facility] OR [address] [place] [address];  Service: Urology;  Laterality: Left;     ANTEGRADE NEPHROSTOGRAPHY Right 10/27/2022    Procedure: NEPHROSTOGRAM, ANTEGRADE;  Surgeon: Chirag Russ MD;  Location: [facility] OR 1ST FLR;  Service: Urology;  Laterality: Right;    ANTEGRADE NEPHROSTOGRAPHY Right 4/21/2023    Procedure: NEPHROSTOGRAM, ANTEGRADE;  Surgeon: Chirag Russ MD;  Location: [facility] OR 2ND FLR;  Service: Urology;  Laterality: Right;    ANTEGRADE NEPHROSTOGRAPHY Left 6/6/2023    Procedure: Nephrostogram - antegrade;  Surgeon: Leslie Balbuena MD;  Location: [facility] OR 1ST FLR;  Service: Urology;  Laterality: Left;    BILATERAL OOPHORECTOMY  2015    CHOLECYSTECTOMY  11/09/2016    Done at [facility], path showed chronic cholecystitis and gallstones    cold knife conization  2014    COLECTOMY, RIGHT  9/17/2020    Procedure: COLECTOMY, RIGHT Extended;  Surgeon: Hammad Reynolds MD;  Location: [facility] OR 2ND FLR;  Service: Colon and Rectal;;    COLONOSCOPY  2014    COLONOSCOPY N/A 10/24/2016    at [facility], Dr Gutiérrez    COLONOSCOPY N/A 5/18/2018    Procedure: COLONOSCOPY;  Surgeon: Arden Gutiérrez MD;  Location: [facility] ENDO (4TH FLR);  Service: Endoscopy;  Laterality: N/A;    COLONOSCOPY N/A 7/28/2020    Procedure: COLONOSCOPY;  Surgeon: Hammad Reynolds MD;  Location: [facility] ENDO (4TH FLR);  Service: Colon and Rectal;  Laterality: N/A;  covid test [place] 7/25    CYSTOSCOPY WITH URETEROSCOPY, RETROGRADE PYELOGRAPHY, AND INSERTION OF STENT Bilateral 3/21/2020    Procedure: CYSTOSCOPY, WITH RETROGRADE PYELOGRAM,;  Surgeon: Leslie Balbuena MD;  Location: [facility] OR 1ST FLR;  Service: Urology;  Laterality: Bilateral;    DILATION OF NEPHROSTOMY TRACT Right 10/27/2022    Procedure: DILATION, NEPHROSTOMY TRACT;  Surgeon: Chirag Russ MD;  Location: [facility] OR 1ST FLR;  Service: Urology;  Laterality: Right;    ESOPHAGOGASTRODUODENOSCOPY  2014    ESOPHAGOGASTRODUODENOSCOPY  11/18/2020    ESOPHAGOGASTRODUODENOSCOPY N/A 11/18/2020    Procedure: ESOPHAGOGASTRODUODENOSCOPY (EGD);  Surgeon: Zenon MICHELLE  MD Tj;  Location: [facility] ENDO;  Service: Endoscopy;  Laterality: N/A;    ESOPHAGOGASTRODUODENOSCOPY N/A 12/11/2020    Procedure: EGD (ESOPHAGOGASTRODUODENOSCOPY);  Surgeon: Juancho Muse MD;  Location: [facility] ENDO (2ND FLR);  Service: Endoscopy;  Laterality: N/A;    EXCISION, SMALL INTESTINE  2/6/2024    Procedure: EXCISION, SMALL INTESTINE;  Surgeon: Hammad Reynolds MD;  Location: [facility] OR 2ND FLR;  Service: Colon and Rectal;;    HYSTERECTOMY  2014    [facility] for cervical cancer    ILEOSTOMY  9/17/2020    Procedure: CREATION, ILEOSTOMY;  Surgeon: Hammad Reynolds MD;  Location: [facility] OR 2ND FLR;  Service: Colon and Rectal;;    ILEOSTOMY CLOSURE N/A 2/6/2024    Procedure: CLOSURE, ILEOSTOMY;  Surgeon: Hammad Reynolds MD;  Location: [facility] OR 2ND FLR;  Service: Colon and Rectal;  Laterality: N/A;    LAPAROTOMY, EXPLORATORY N/A 2/6/2024    Procedure: OPEN LAPAROTOMY, EXPLORATORY;  Surgeon: Leslie Balbuena MD;  Location: [facility] OR 2ND FLR;  Service: Urology;  Laterality: N/A;  22 modifier    LOOPOGRAM N/A 4/20/2021    Procedure: LOOPOGRAM;  Surgeon: Leslie Balbuena MD;  Location: [facility] OR 1ST FLR;  Service: Urology;  Laterality: N/A;    LOOPOGRAM N/A 6/6/2023    Procedure: LOOPOGRAM;  Surgeon: Leslie Balbuena MD;  Location: [facility] OR 1ST FLR;  Service: Urology;  Laterality: N/A;    LYSIS OF ADHESIONS  2/6/2024    Procedure: LYSIS, ADHESIONS;  Surgeon: Leslie Balbuena MD;  Location: [facility] OR 2ND FLR;  Service: Urology;;    LYSIS OF ADHESIONS  2/6/2024    Procedure: LYSIS, ADHESIONS;  Surgeon: Hammad Reynolds MD;  Location: [facility] OR 2ND FLR;  Service: Colon and Rectal;;    MOBILIZATION OF SPLENIC FLEXURE N/A 9/11/2020    Procedure: MOBILIZATION, COLONIC;  Surgeon: Hammad Reynolds MD;  Location: [facility] OR 2ND FLR;  Service: Colon and Rectal;  Laterality: N/A;    NEPHROSTOGRAPHY Bilateral 4/17/2021    Procedure: Nephrostogram;  Surgeon: Celeste Surgeon;  Location: [facility];  Service: Anesthesiology;  Laterality: Bilateral;     OOPHORECTOMY      PERCUTANEOUS NEPHROLITHOTOMY Right 4/21/2023    Procedure: NEPHROLITHOTOMY, PERCUTANEOUS;  Surgeon: Chirag Russ MD;  [facility];  Service: Urology;  Laterality: Right;  2.5 hrs    PERCUTANEOUS NEPHROSTOMY  4/21/2023    Procedure: CREATION, NEPHROSTOMY, PERCUTANEOUS with removal of existing nephrostomy tube;  Surgeon: Chirag Russ MD;  [facility];  Service: Urology;;    PYELOSCOPY Right 10/27/2022    Procedure: PYELOSCOPY;  Surgeon: Chirag Russ MD;  [facility];  Service: Urology;  Laterality: Right;    REPLACEMENT OF NEPHROSTOMY TUBE Right 10/27/2022    Procedure: REPLACEMENT, NEPHROSTOMY TUBE;  Surgeon: Chirag Russ MD;  [facility];  Service: Urology;  Laterality: Right;    RETROPERITONEAL LYMPHADENECTOMY Right 9/17/2018    Procedure: LYMPHADENECTOMY, RETROPERITONEUM;  Surgeon: Sebas Reed MD;  [facility];  Service: General;  Laterality: Right;  ILIAC    REVISION OF ANASTOMOSIS OF URETER TO ILEAL CONDUIT N/A 2/6/2024    Procedure: REVISION, ANASTOMOSIS, URETEROILEAL;  Surgeon: Leslie Balbuena MD;  [facility];  Service: Urology;  Laterality: N/A;    URETEROSCOPIC REMOVAL OF URETERIC CALCULUS Right 10/27/2022    Procedure: REMOVAL, CALCULUS, URETER, URETEROSCOPIC;  Surgeon: Chirag Russ MD;  Location: [facility] OR [address] [place] [address];  Service: Urology;  Laterality: Right;    URETEROSCOPY Right 10/27/2022    Procedure: URETEROSCOPY-ANTEGRADE;  Surgeon: Chirag Russ MD;  Location: [facility] OR [address] [place] [address];  Service: Urology;  Laterality: Right;     Family History   Problem Relation Name Age of Onset    Heart disease Sister      Heart disease Maternal Grandmother      Colon cancer Father      Esophageal cancer Father      Cancer Father          Lung-smoker    Cancer Mother          Cervical    Cervical cancer Mother      Breast cancer Maternal Aunt      Suicide Daughter Gayla         jumped  from parking structure    Drug abuse Daughter Gayla     Drug abuse Daughter Hailey     Rectal cancer Neg Hx      Stomach cancer Neg Hx      Crohn's disease Neg Hx      Ulcerative colitis Neg Hx      Diabetes Neg Hx      Hypertension Neg Hx       Social History     Tobacco Use    Smoking status: Never    Smokeless tobacco: Never   Substance Use Topics    Alcohol use: No     Alcohol/week: 0.0 standard drinks of alcohol    Drug use: No     Review of Systems    Physical Exam     Initial Vitals [08/07/24 1530]   BP Pulse Resp Temp SpO2   (!) 126/59 95 15 98.6 °F (37 °C) 98 %      MAP       --         Physical Exam    Constitutional: She appears well-developed and well-nourished. No distress.   HENT:   Head: Normocephalic.   Eyes: Conjunctivae are normal. Pupils are equal, round, and reactive to light.   Neck: Neck supple. No JVD present.   Normal range of motion.  Cardiovascular:  Normal rate, regular rhythm and normal heart sounds.           Pulmonary/Chest: Breath sounds normal. No respiratory distress. She has no wheezes. She has no rales.   Abdominal: Abdomen is soft. Bowel sounds are normal. She exhibits no distension. There is no abdominal tenderness. There is no rebound and no guarding.   Musculoskeletal:      Cervical back: Normal range of motion and neck supple.     Neurological: She is alert. She has normal strength.   Psychiatric: She has a normal mood and affect.         ED Course   Procedures  Labs Reviewed   COMPREHENSIVE METABOLIC PANEL - Abnormal       Result Value    Sodium 141      Potassium 4.2      Chloride 112 (*)     CO2 18 (*)     Glucose 103      BUN 16      Creatinine 2.0 (*)     Calcium 9.8      Total Protein 8.5 (*)     Albumin 3.3 (*)     Total Bilirubin 0.4      Alkaline Phosphatase 122      AST 31      ALT 29      eGFR 27.9 (*)     Anion Gap 11     CBC W/ AUTO DIFFERENTIAL - Abnormal    WBC 11.66      RBC 4.47      Hemoglobin 11.9 (*)     Hematocrit 40.7      MCV 91      MCH 26.6 (*)      MCHC 29.2 (*)     RDW 15.3 (*)     Platelets 288      MPV 12.0      Immature Granulocytes 0.3      Gran # (ANC) 9.2 (*)     Immature Grans (Abs) 0.04      Lymph # 1.3      Mono # 1.0      Eos # 0.1      Baso # 0.06      nRBC 0      Gran % 79.2 (*)     Lymph % 10.7 (*)     Mono % 8.7      Eosinophil % 0.6      Basophil % 0.5      Differential Method Automated     URINALYSIS, REFLEX TO URINE CULTURE - Abnormal    Specimen UA Urine, Supra Pubic      Color, UA Yellow      Appearance, UA Hazy (*)     pH, UA 8.0      Specific Gravity, UA 1.010      Protein, UA 1+ (*)     Glucose, UA Negative      Ketones, UA Negative      Bilirubin (UA) Negative      Occult Blood UA 1+ (*)     Nitrite, UA Positive (*)     Leukocytes, UA 3+ (*)     Narrative:     Specimen Source->Urine   URINALYSIS MICROSCOPIC - Abnormal    RBC, UA 4      WBC, UA 61 (*)     Bacteria Many (*)     Squam Epithel, UA 0      Hyaline Casts, UA 0      Microscopic Comment SEE COMMENT      Narrative:     Specimen Source->Urine   ISTAT PROCEDURE - Abnormal    POC Glucose 108      POC BUN 22      POC Creatinine 2.0 (*)     POC Sodium 142      POC Potassium 4.3      POC Chloride 110      POC TCO2 (MEASURED) 22 (*)     POC Ionized Calcium 1.17      POC Hematocrit 39      Sample XIAO     CULTURE, URINE   CULTURE, URINE   PROTIME-INR   APTT   PROTIME-INR   APTT   ISTAT CHEM8          Imaging Results              CT Abdomen Pelvis  Without Contrast (Final result)  Result time 08/07/24 17:47:07      Final result by Vinayak Baer DO (08/07/24 17:47:07)                   Impression:      1. Severe right-sided hydronephrosis, secondary to an approximately 6 mm linear calculus just proximal to the anastomosis.  2. Postoperative changes of urinary diversion and left lower quadrant ileostomy.  3. Mild left ureteral wall thickening which can be seen with pyelitis, recommend correlation with urinalysis.  4. Additional nonobstructing bilateral  nephrolithiasis.      Electronically signed by: Vinayak Baer  Date:    08/07/2024  Time:    17:47               Narrative:    EXAMINATION:  CT ABDOMEN PELVIS WITHOUT CONTRAST    CLINICAL HISTORY:  Flank pain, kidney stone suspected;    TECHNIQUE:  Multiplanar images were obtained of the abdomen and pelvis from the hemidiaphragms through the symphysis pubis without intravenous contrast.    COMPARISON:  CT abdomen and pelvis from 02/16/2024.    FINDINGS:  Lung Bases: Clear.    Heart: Heart size is normal.  No pericardial effusion.    Liver: Normal in size and attenuation without focal hepatic lesion.    Biliary tract: No intrahepatic or extrahepatic biliary ductal dilatation.    Gallbladder: The gallbladder is absent.    Pancreas: Normal. No pancreatic ductal dilatation.    Spleen: Normal size without focal lesion.    Adrenals: Normal.    Kidneys and urinary collecting systems: There are postoperative changes of urinary diversion with a left lower quadrant ileostomy.  There is severe right-sided hydronephrosis, worsened from prior, with a linear hyperdensity in the ureter, just proximal to the anastomosis (series 601, image 118), suspicious for an obstructing calculus or collection of small calculi.  This measures approximately 6 mm.  There is a 1.0 cm nonobstructing calculus in the right kidney lower pole.  There are several left sided nonobstructing renal calculi, the largest of which measures 8 mm.  There is wall thickening of the left ureter and renal collecting system with adjacent fat stranding.  There is an external pelvis on the left, without significant hydronephrosis.    Lymph nodes: None enlarged.    Stomach and bowel: The stomach is normal.  There are postop changes of partial colectomy.  Loops of small and large bowel are normal in caliber without evidence for inflammation or obstruction.    Peritoneum and mesentery: No ascites or free intraperitoneal air. No abdominal fluid collection.  There are  multiple surgical clips.    Vasculature: Normal.    Urinary bladder: The urinary bladder is contracted.  No wall thickening.    Reproductive organs: The uterus is absent.    Body wall: Scar tissue in the anterior abdominal wall.    Musculoskeletal: No aggressive osseous lesion.                                       Medications   morphine injection 8 mg (8 mg Intravenous Given 8/7/24 1715)   ondansetron injection 4 mg (4 mg Intravenous Given 8/7/24 1714)   sodium chloride 0.9% bolus 1,000 mL 1,000 mL (0 mLs Intravenous Stopped 8/7/24 1905)   ceFEPIme (MAXIPIME) 2 g in D5W 100 mL IVPB (MB+) (0 g Intravenous Stopped 8/7/24 1956)   morphine injection 6 mg (6 mg Intravenous Given 8/7/24 2009)     Medical Decision Making  Patient with right flank pain.  CT reveals severe hydronephrosis.  She has had this in the past.  She also has acute kidney injury.  Her urine appears infected but it is from a urostomy bag.  I had given her an antibiotic.  Consult Urology.  They felt we could hold the antibiotic going for.  Recommended admission to Hospital Medicine for nephrostomy tube by Interventional Radiology.  They will follow.    I started message with Hospital Medicine.  Check case out to Dr. Martinez    Amount and/or Complexity of Data Reviewed  Labs: ordered.  Radiology: ordered.    Risk  Prescription drug management.                                      Clinical Impression:  Final diagnoses:  [N13.30] Hydronephrosis, unspecified hydronephrosis type (Primary)  [N17.9] Acute kidney injury          ED Disposition Condition    Admit Stable                Jian Li MD  08/07/24 [id number]

## 2024-08-07 NOTE — ED TRIAGE NOTES
Edita Riley, a 59 y.o. female presents to the ED w/ complaint of abdominal pain radiates to right flank    Triage note:  Chief Complaint   Patient presents with    Abdominal Pain     Right flank pain radiating to her back with vomiting     Review of patient's allergies indicates:   Allergen Reactions    Bee sting [allergen ext-venom-honey bee]      Rash      Grass pollen-bermuda, standard      rash     Past Medical History:   Diagnosis Date    Abnormal mammogram 08/25/2020    Abnormal mammogram 08/25/2020    Acute blood loss anemia     Acute deep vein thrombosis (DVT) of lower extremity 12/09/2020    Advance care planning 04/30/2021    ODESSA (acute kidney injury) 03/21/2020    Anemia due to chronic blood loss     Anemia due to chronic kidney disease     Anemia due to chronic kidney disease 05/18/2020    Anxiety     Bilateral ureteral obstruction 09/11/2020    Bilious vomiting with nausea 06/11/2023    Cardiovascular event risk -- low 09/14/2015    ASCVD 10-year risk 1.9% (with optimal risk factors 1.3%) as of 9/14/2015     Cervical cancer 2014    Chronic back pain     Colostomy care     Deep vein thrombosis     Depression     Diarrhea due to malabsorption 11/14/2018    Difficult intubation     Discharge planning issues 04/30/2021    Disorder of kidney and ureter     DVT of lower extremity, bilateral 11/04/2020    Edema 04/10/2023    Emphysema of lung 04/10/2023    Fibromyalgia     Fungemia 09/27/2020    Generalized abdominal pain 08/25/2020    GERD (gastroesophageal reflux disease)     Hemifacial spasm 09/16/2015    Hiatal hernia 2014    History of cervical cancer 10/11/2018    History of essential hypertension 05/04/2015    Not requiring medications at this time  BP is low- concern that this may correlate with increased stool output from stoma. Encourage her to contact GI and her PCP.    Hx of psychiatric care     Cymbalta, trazodone    Hypertension     Hypomagnesemia 11/21/2018    Hyponatremia  09/10/2023    Impaired functional mobility, balance, gait, and endurance 07/24/2015    Lactose intolerance     Major depressive disorder, recurrent episode, moderate 06/02/2023    Metastatic squamous cell carcinoma to lymph node 10/11/2018    Moderate episode of recurrent major depressive disorder 04/21/2021    Nephrostomy complication 10/26/2023    Neuropathy due to chemotherapeutic drug 11/14/2018    Osteoarthritis of back     Peritonitis 09/22/2020    Physical deconditioning 04/30/2021    Pseudomonas urinary tract infection 04/21/2021    Psychiatric problem     Pyelitis 09/09/2023    QT prolongation 04/16/2021    Refusal of blood transfusions as patient is [employer]     Schatzki's ring 09/14/2015    Seen on outside EGD 05/2014, underwent esophageal dilatation. Bx were negative.     Seizure-like activity 12/02/2018    Seizures     Sleep stage dysfunction     Noted on PSG 06/2017; negative for obstructive sleep apnea     Stroke     Urinary tract infection associated with nephrostomy catheter 06/11/2020    Wound infection after surgery 09/24/2020         APPEARANCE: awake and alert in NAD. PAIN  10/10  SKIN: warm, dry and intact. No breakdown or bruising.  MUSCULOSKELETAL: Patient moving all extremities spontaneously, no obvious swelling or deformities noted. Ambulates independently.  RESPIRATORY: Denies shortness of breath.Respirations unlabored.   CARDIAC: Denies CP, 2+ distal pulses; no peripheral edema  ABDOMEN: S/ND/NT, endorses nausea  : voids spontaneously, denies difficulty  Neurologic: AAO x 4; follows commands equal strength in all extremities; denies numbness/tingling. Denies dizziness

## 2024-08-07 NOTE — FIRST PROVIDER EVALUATION
Medical screening examination initiated.  I have conducted a focused provider triage encounter, findings are as follows:    Brief history of present illness:  59 F here for pain right flank/ abdomen pain with vomiting.  Hx of stones. - fever    There were no vitals filed for this visit.    Pertinent physical exam:  sitting in wheelchair, appears uncomfortable but nontoxic    Brief workup plan:  labs, UA    Preliminary workup initiated; this workup will be continued and followed by the physician or advanced practice provider that is assigned to the patient when roomed.

## 2024-08-08 LAB
ANION GAP SERPL CALC-SCNC: 11 MMOL/L (ref 8–16)
BACTERIA UR CULT: NORMAL
BACTERIA UR CULT: NORMAL
BASOPHILS # BLD AUTO: 0.05 K/UL (ref 0–0.2)
BASOPHILS NFR BLD: 0.4 % (ref 0–1.9)
BUN SERPL-MCNC: 16 MG/DL (ref 8–23)
CALCIUM SERPL-MCNC: 9.3 MG/DL (ref 8.7–10.5)
CHLORIDE SERPL-SCNC: 115 MMOL/L (ref 95–110)
CO2 SERPL-SCNC: 16 MMOL/L (ref 23–29)
CREAT SERPL-MCNC: 1.6 MG/DL (ref 0.5–1.4)
DIFFERENTIAL METHOD BLD: ABNORMAL
EOSINOPHIL # BLD AUTO: 0.1 K/UL (ref 0–0.5)
EOSINOPHIL NFR BLD: 0.6 % (ref 0–8)
ERYTHROCYTE [DISTWIDTH] IN BLOOD BY AUTOMATED COUNT: 15.3 % (ref 11.5–14.5)
EST. GFR  (NO RACE VARIABLE): 36.5 ML/MIN/1.73 M^2
GLUCOSE SERPL-MCNC: 85 MG/DL (ref 70–110)
HCT VFR BLD AUTO: 40.5 % (ref 37–48.5)
HGB BLD-MCNC: 12.1 G/DL (ref 12–16)
IMM GRANULOCYTES # BLD AUTO: 0.07 K/UL (ref 0–0.04)
IMM GRANULOCYTES NFR BLD AUTO: 0.5 % (ref 0–0.5)
LYMPHOCYTES # BLD AUTO: 1.5 K/UL (ref 1–4.8)
LYMPHOCYTES NFR BLD: 11.3 % (ref 18–48)
MAGNESIUM SERPL-MCNC: 1.8 MG/DL (ref 1.6–2.6)
MCH RBC QN AUTO: 27 PG (ref 27–31)
MCHC RBC AUTO-ENTMCNC: 29.9 G/DL (ref 32–36)
MCV RBC AUTO: 90 FL (ref 82–98)
MONOCYTES # BLD AUTO: 1.6 K/UL (ref 0.3–1)
MONOCYTES NFR BLD: 12.1 % (ref 4–15)
NEUTROPHILS # BLD AUTO: 9.7 K/UL (ref 1.8–7.7)
NEUTROPHILS NFR BLD: 75.1 % (ref 38–73)
NRBC BLD-RTO: 0 /100 WBC
PLATELET # BLD AUTO: 254 K/UL (ref 150–450)
PMV BLD AUTO: 10.7 FL (ref 9.2–12.9)
POTASSIUM SERPL-SCNC: 4.2 MMOL/L (ref 3.5–5.1)
PROCALCITONIN SERPL IA-MCNC: 0.1 NG/ML
RBC # BLD AUTO: 4.48 M/UL (ref 4–5.4)
SODIUM SERPL-SCNC: 142 MMOL/L (ref 136–145)
WBC # BLD AUTO: 12.91 K/UL (ref 3.9–12.7)

## 2024-08-08 PROCEDURE — 63600175 PHARM REV CODE 636 W HCPCS: Performed by: NURSE PRACTITIONER

## 2024-08-08 PROCEDURE — 85025 COMPLETE CBC W/AUTO DIFF WBC: CPT

## 2024-08-08 PROCEDURE — 0T9330Z DRAINAGE OF RIGHT KIDNEY PELVIS WITH DRAINAGE DEVICE, PERCUTANEOUS APPROACH: ICD-10-PCS | Performed by: INTERNAL MEDICINE

## 2024-08-08 PROCEDURE — 63600175 PHARM REV CODE 636 W HCPCS: Performed by: INTERNAL MEDICINE

## 2024-08-08 PROCEDURE — 84145 PROCALCITONIN (PCT): CPT | Performed by: NURSE PRACTITIONER

## 2024-08-08 PROCEDURE — 20600001 HC STEP DOWN PRIVATE ROOM

## 2024-08-08 PROCEDURE — 80048 BASIC METABOLIC PNL TOTAL CA: CPT

## 2024-08-08 PROCEDURE — 36415 COLL VENOUS BLD VENIPUNCTURE: CPT

## 2024-08-08 PROCEDURE — 36415 COLL VENOUS BLD VENIPUNCTURE: CPT | Performed by: NURSE PRACTITIONER

## 2024-08-08 PROCEDURE — 83735 ASSAY OF MAGNESIUM: CPT | Performed by: NURSE PRACTITIONER

## 2024-08-08 PROCEDURE — 25000003 PHARM REV CODE 250: Performed by: INTERNAL MEDICINE

## 2024-08-08 PROCEDURE — 25500020 PHARM REV CODE 255: Performed by: STUDENT IN AN ORGANIZED HEALTH CARE EDUCATION/TRAINING PROGRAM

## 2024-08-08 PROCEDURE — 63600175 PHARM REV CODE 636 W HCPCS: Performed by: STUDENT IN AN ORGANIZED HEALTH CARE EDUCATION/TRAINING PROGRAM

## 2024-08-08 PROCEDURE — 25000003 PHARM REV CODE 250: Performed by: NURSE PRACTITIONER

## 2024-08-08 PROCEDURE — 25000003 PHARM REV CODE 250

## 2024-08-08 RX ORDER — MIDAZOLAM HYDROCHLORIDE 1 MG/ML
INJECTION, SOLUTION INTRAMUSCULAR; INTRAVENOUS
Status: COMPLETED | OUTPATIENT
Start: 2024-08-08 | End: 2024-08-08

## 2024-08-08 RX ORDER — HYDROMORPHONE HYDROCHLORIDE 1 MG/ML
INJECTION, SOLUTION INTRAMUSCULAR; INTRAVENOUS; SUBCUTANEOUS
Status: DISPENSED
Start: 2024-08-08 | End: 2024-08-09

## 2024-08-08 RX ORDER — LIDOCAINE HYDROCHLORIDE 10 MG/ML
INJECTION, SOLUTION INFILTRATION; PERINEURAL
Status: COMPLETED | OUTPATIENT
Start: 2024-08-08 | End: 2024-08-08

## 2024-08-08 RX ORDER — HEPARIN SODIUM 5000 [USP'U]/ML
5000 INJECTION, SOLUTION INTRAVENOUS; SUBCUTANEOUS EVERY 8 HOURS
Status: DISCONTINUED | OUTPATIENT
Start: 2024-08-08 | End: 2024-08-12 | Stop reason: HOSPADM

## 2024-08-08 RX ORDER — ACETAMINOPHEN 500 MG
1000 TABLET ORAL ONCE
Status: COMPLETED | OUTPATIENT
Start: 2024-08-08 | End: 2024-08-08

## 2024-08-08 RX ORDER — HYDROMORPHONE HYDROCHLORIDE 1 MG/ML
0.5 INJECTION, SOLUTION INTRAMUSCULAR; INTRAVENOUS; SUBCUTANEOUS ONCE
Status: DISCONTINUED | OUTPATIENT
Start: 2024-08-08 | End: 2024-08-10

## 2024-08-08 RX ORDER — ACETAMINOPHEN 500 MG
TABLET ORAL
Status: COMPLETED
Start: 2024-08-08 | End: 2024-08-08

## 2024-08-08 RX ORDER — FENTANYL CITRATE 50 UG/ML
INJECTION, SOLUTION INTRAMUSCULAR; INTRAVENOUS
Status: COMPLETED | OUTPATIENT
Start: 2024-08-08 | End: 2024-08-08

## 2024-08-08 RX ADMIN — Medication 6 MG: at 12:08

## 2024-08-08 RX ADMIN — ACETAMINOPHEN 1000 MG: 500 TABLET ORAL at 04:08

## 2024-08-08 RX ADMIN — MIDAZOLAM 1 MG: 1 INJECTION INTRAMUSCULAR; INTRAVENOUS at 03:08

## 2024-08-08 RX ADMIN — HYDROMORPHONE HYDROCHLORIDE 1 MG: 1 INJECTION, SOLUTION INTRAMUSCULAR; INTRAVENOUS; SUBCUTANEOUS at 12:08

## 2024-08-08 RX ADMIN — Medication 1000 MG: at 04:08

## 2024-08-08 RX ADMIN — HEPARIN SODIUM 5000 UNITS: 5000 INJECTION INTRAVENOUS; SUBCUTANEOUS at 09:08

## 2024-08-08 RX ADMIN — IOHEXOL 30 ML: 300 INJECTION, SOLUTION INTRAVENOUS at 04:08

## 2024-08-08 RX ADMIN — FERROUS SULFATE TAB EC 325 MG (65 MG FE EQUIVALENT) 1 EACH: 325 (65 FE) TABLET DELAYED RESPONSE at 06:08

## 2024-08-08 RX ADMIN — SODIUM CHLORIDE: 9 INJECTION, SOLUTION INTRAVENOUS at 12:08

## 2024-08-08 RX ADMIN — LIDOCAINE HYDROCHLORIDE 10 ML: 10 INJECTION, SOLUTION INFILTRATION; PERINEURAL at 03:08

## 2024-08-08 RX ADMIN — CEFTRIAXONE SODIUM 1 G: 1 INJECTION, POWDER, FOR SOLUTION INTRAMUSCULAR; INTRAVENOUS at 03:08

## 2024-08-08 RX ADMIN — MIRTAZAPINE 30 MG: 15 TABLET, FILM COATED ORAL at 09:08

## 2024-08-08 RX ADMIN — FENTANYL CITRATE 50 MCG: 50 INJECTION, SOLUTION INTRAMUSCULAR; INTRAVENOUS at 03:08

## 2024-08-08 RX ADMIN — FENTANYL CITRATE 25 MCG: 50 INJECTION, SOLUTION INTRAMUSCULAR; INTRAVENOUS at 03:08

## 2024-08-08 RX ADMIN — MIRTAZAPINE 30 MG: 15 TABLET, FILM COATED ORAL at 12:08

## 2024-08-08 RX ADMIN — Medication 6 MG: at 09:08

## 2024-08-08 RX ADMIN — HYDROMORPHONE HYDROCHLORIDE 1 MG: 1 INJECTION, SOLUTION INTRAMUSCULAR; INTRAVENOUS; SUBCUTANEOUS at 09:08

## 2024-08-08 RX ADMIN — SENNOSIDES AND DOCUSATE SODIUM 1 TABLET: 50; 8.6 TABLET ORAL at 09:08

## 2024-08-08 NOTE — NURSING
Patient arrives to [facility] for 1 hour recovery then will transport back to [facility].   Report received from My, RN.  VS stable, see flowsheets for assessment.  Patient denies needs at present time.

## 2024-08-08 NOTE — CARE UPDATE
Pt fully recovered from procedure and report called to [facility]. Pt given IV pain meds and tylenol prior to leaving to unit.

## 2024-08-08 NOTE — PROGRESS NOTES
[facility] ([place] [id number])  Urology  Progress Note    Patient Name: Edita Riley  MRN: [id number]  Admission Date: 8/7/2024  Hospital Length of Stay: 1 days  Code Status: Full Code   Attending Provider: Brittney Bolton MD   Primary Care Physician: Trina Vu MD    Subjective:     HPI:  Edita Riley is a 59 y.o. female who has a history of pelvic irradiation with ureteral strictures bilaterally.  History of colo-colonic anastomotic leak and peritonitis following colonic urinary conduit creation, s/p extended right colectomy with end ileostomy and left ureter-colonic conduit anastomotic stricture.  ft ureteral re implant to a transverse colon conduit on 2/6/2024, lysis of adhesions, takedown of ileostomy with ileosigmoid anastamosis.     She had severe right sided flank pain at 3PM on 8/7 with associated n/v, denies f/c, prompting her to go to ED. In the ED she has been AFVSS, WBC wnl, Cr 2.0 (baseline ~1.2). CT non con showing worsening right sided hydronephrosis from prior scan, and small slightly radiopaque mass in distal ureter, likely debris. No left sided ureteral stones. Urology consulted for recommendations.     Interval History: No acute events overnight. Hemodynamically stable. Pain is well controlled on current pain regimen. She has been NPO since midnight.     Review of Systems  Objective:     Temp:  [98 °F (36.7 °C)-98.6 °F (37 °C)] 98 °F (36.7 °C)  Pulse:  [60-95] 60  Resp:  [15-18] 18  SpO2:  [95 %-100 %] 95 %  BP: ()/(59-78) 125/61     Body mass index is 28.35 kg/m².           Drains       Drain  Duration                  Urostomy 02/06/24 1815 ureterostomy, left 183 days         Urostomy 02/25/24 1600  days                     Physical Exam  Constitutional:       Appearance: Normal appearance.   HENT:      Head: Normocephalic and atraumatic.   Abdominal:      General: Abdomen is flat.      Palpations: Abdomen is soft.      Comments: LLQ  "ileostomy, ostomy pink and patent, appears healthy  Clear yellow urine in bag  Abd soft, non tender, non distended  Mild right flank tenderness, no left sided flank tenderness       Skin:     General: Skin is warm.      Capillary Refill: Capillary refill takes less than 2 seconds.   Neurological:      General: No focal deficit present.      Mental Status: She is alert and oriented to person, place, and time.   Psychiatric:         Mood and Affect: Mood normal.           Significant Labs:    BMP:  Recent Labs   Lab 08/07/24  1644 08/08/24  0359    142   K 4.2 4.2   * 115*   CO2 18* 16*   BUN 16 16   CREATININE 2.0* 1.6*   CALCIUM 9.8 9.3       CBC:   Recent Labs   Lab 08/07/24  1644 08/07/24  1651 08/08/24  0359   WBC 11.66  --  12.91*   HGB 11.9*  --  12.1   HCT 40.7 39 40.5     --  254       Blood Culture: No results for input(s): "LABBLOO" in the last 168 hours.  Urine Culture: No results for input(s): "LABURIN" in the last 168 hours.  Urine Studies:   Recent Labs   Lab 08/07/24  1755   COLORU Yellow   APPEARANCEUA Hazy*   PHUR 8.0   SPECGRAV 1.010   PROTEINUA 1+*   GLUCUA Negative   KETONESU Negative   BILIRUBINUA Negative   OCCULTUA 1+*   NITRITE Positive*   LEUKOCYTESUR 3+*   RBCUA 4   WBCUA 61*   BACTERIA Many*   SQUAMEPITHEL 0   HYALINECASTS 0       Significant Imaging:  All pertinent imaging results/findings from the past 24 hours have been reviewed.                  Assessment/Plan:     Ileostomy in place  59yoF w/complex urological history currently with ileostomy in place with concern for right sided hydro and ODESSA.      - IR consult for placement of right nephrostomy tube, the patient has been NPO    - Appears there may be small debris in right distal ureter near ureteroileal anastomosis   - Right sided hydronephrosis slightly worsened from prior scan   - Currently low concern for infection    - Urine culture pending, will follow up    - Will obtain UA/UCx at time of nephrostomy tube " placement, will treat if needed ahead of urologic procedure    - Plan for antegrade ureteroscopy to evaluate right ureter after NT placement   - Recommend admission to hospital medicine for management of comorbidities   - Recommend Flomax   - Recommend IVF   - Please reach out to urology team if patient's clinical status changes    Please feel free to reach out with any questions.         VTE Risk Mitigation (From admission, onward)           Ordered     enoxaparin injection 30 mg  Daily         08/07/24 2305     IP VTE HIGH RISK PATIENT  Once         08/07/24 2305     Place sequential compression device  Until discontinued         08/07/24 2305                    Lupe Oneil MD  Urology  [facility] ([place])

## 2024-08-08 NOTE — HPI
[facility] 59 [facility] who has a history of pelvic irradiation with ureteral strictures bilaterally.  History of colo-colonic anastomotic leak and peritonitis following colonic urinary conduit creation, s/p extended right colectomy with end ileostomy and left ureter-colonic conduit anastomotic stricture.  ft ureteral re implant to a transverse colon conduit on 2/6/2024, lysis of adhesions, takedown of ileostomy with ileosigmoid anastamosis.     She had severe right sided flank pain at 3PM on 8/7 with associated n/v, denies f/c, prompting her to go to ED. In the ED she has been AFVSS, WBC wnl, Cr 2.0 (baseline ~1.2). CT non con showing worsening right sided hydronephrosis from prior scan, and small slightly radiopaque mass in distal ureter, likely debris. No left sided ureteral stones. Urology consulted for recommendations.

## 2024-08-08 NOTE — H&P
Interventional Radiology  Consult/History & Physical Note    Consult Requested By: Marlo Alejo MD  Reason for Consult: Right nephrostomy tube, ODESSA and hydro in setting of ileal conduit urinary diversion    SUBJECTIVE:     Chief Complaint:  ODESSA    History of Present Illness:  Edita Riley is a 59 y.o. female with a PMHx of pelvic radiation for cervical cancer with ureteral strictures bilaterally requiring nephrostomy tubes in the past. She was admitted on 8/7/24 for right-sided flank pain. Interventional Radiology has been consulted for right sided nephrostomy tube placement for management of R sided hydronephrosis. Pt has had recent imaging including a CT a/p on 8/7/24 which revealed severe right-sided hydronephrosis. Urology following who recommend nephrostomy tube placement for urinary diversion. The pt has undergone bilateral nephrostomy tube placement in the past. The pt's Cr is currently 1.6 and is trending down. The pt's WBC is 12.9 and is trending up. Pt is afebrile and hemodynamically stable. She denies a history of JENN requiring nightly CPAP and difficulty breathing when lying flat. She does not take any anticoagulants. She has been NPO since midnight.    Review of Systems   Constitutional:  Positive for malaise/fatigue. Negative for chills and fever.   Respiratory:  Negative for cough and shortness of breath.    Cardiovascular:  Negative for chest pain and leg swelling.   Gastrointestinal:  Positive for abdominal pain and nausea. Negative for diarrhea.        Right flank pain   Genitourinary:  Positive for flank pain.       Scheduled Meds:   ferrous sulfate  1 tablet Oral Daily    heparin (porcine)  5,000 Units Subcutaneous Q8H    mirtazapine  30 mg Oral Nightly    senna-docusate 8.6-50 mg  1 tablet Oral BID     Continuous Infusions:   0.9% NaCl   Intravenous Continuous 75 mL/hr at 08/08/24 1248 New Bag at 08/08/24 1248     PRN Meds:  Current Facility-Administered Medications:      acetaminophen, 650 mg, Oral, Q4H PRN    HYDROmorphone, 1 mg, Intravenous, Q4H PRN    melatonin, 6 mg, Oral, Nightly PRN    ondansetron, 8 mg, Oral, Q8H PRN    ondansetron, 4 mg, Intravenous, Q8H PRN    sodium chloride 0.9%, 10 mL, Intravenous, PRN    traMADoL, 50 mg, Oral, Q6H PRN    Review of patient's allergies indicates:   Allergen Reactions    Bee sting [allergen ext-venom-honey bee]      Rash      Grass pollen-bermuda, standard      rash       Past Medical History:   Diagnosis Date    Abnormal mammogram 08/25/2020    Abnormal mammogram 08/25/2020    Acute blood loss anemia     Acute deep vein thrombosis (DVT) of lower extremity 12/09/2020    Advance care planning 04/30/2021    ODESSA (acute kidney injury) 03/21/2020    Anemia due to chronic blood loss     Anemia due to chronic kidney disease     Anemia due to chronic kidney disease 05/18/2020    Anxiety     Bilateral ureteral obstruction 09/11/2020    Bilious vomiting with nausea 06/11/2023    Cardiovascular event risk -- low 09/14/2015    ASCVD 10-year risk 1.9% (with optimal risk factors 1.3%) as of 9/14/2015     Cervical cancer 2014    Chronic back pain     Colostomy care     Deep vein thrombosis     Depression     Diarrhea due to malabsorption 11/14/2018    Difficult intubation     Discharge planning issues 04/30/2021    Disorder of kidney and ureter     DVT of lower extremity, bilateral 11/04/2020    Edema 04/10/2023    Emphysema of lung 04/10/2023    Fibromyalgia     Fungemia 09/27/2020    Generalized abdominal pain 08/25/2020    GERD (gastroesophageal reflux disease)     Hemifacial spasm 09/16/2015    Hiatal hernia 2014    History of cervical cancer 10/11/2018    History of essential hypertension 05/04/2015    Not requiring medications at this time  BP is low- concern that this may correlate with increased stool output from stoma. Encourage her to contact GI and her PCP.    Hx of psychiatric care     Cymbalta, trazodone    Hypertension     Hypomagnesemia  11/21/2018    Hyponatremia 09/10/2023    Impaired functional mobility, balance, gait, and endurance 07/24/2015    Lactose intolerance     Major depressive disorder, recurrent episode, moderate 06/02/2023    Metastatic squamous cell carcinoma to lymph node 10/11/2018    Moderate episode of recurrent major depressive disorder 04/21/2021    Nephrostomy complication 10/26/2023    Neuropathy due to chemotherapeutic drug 11/14/2018    Osteoarthritis of back     Peritonitis 09/22/2020    Physical deconditioning 04/30/2021    Pseudomonas urinary tract infection 04/21/2021    Psychiatric problem     Pyelitis 09/09/2023    QT prolongation 04/16/2021    Refusal of blood transfusions as patient is Oriental orthodox     Schatzki's ring 09/14/2015    Seen on outside EGD 05/2014, underwent esophageal dilatation. Bx were negative.     Seizure-like activity 12/02/2018    Seizures     Sleep stage dysfunction     Noted on PSG 06/2017; negative for obstructive sleep apnea     Stroke     Urinary tract infection associated with nephrostomy catheter 06/11/2020    Wound infection after surgery 09/24/2020     Past Surgical History:   Procedure Laterality Date    ANASTOMOSIS OF INTESTINE N/A 2/6/2024    Procedure: ANASTOMOSIS, INTESTINE - Ileocolic anastomosis;  Surgeon: Hammad Reynolds MD;  Location: [facility] OR [facility];  Service: Colon and Rectal;  Laterality: N/A;    ANTEGRADE NEPHROSTOGRAPHY Bilateral 9/28/2020    Procedure: Nephrostogram - antegrade;  Surgeon: Leslie Balbuena MD;  [facility];  Service: Urology;  Laterality: Bilateral;    ANTEGRADE NEPHROSTOGRAPHY Left 4/20/2021    Procedure: NEPHROSTOGRAM, ANTEGRADE;  Surgeon: Leslie Balbuena MD;  [facility];  Service: Urology;  Laterality: Left;    ANTEGRADE NEPHROSTOGRAPHY Right 10/27/2022    Procedure: NEPHROSTOGRAM, ANTEGRADE;  Surgeon: Chirag Russ MD;  [facility];  Service: Urology;  Laterality: Right;    ANTEGRADE NEPHROSTOGRAPHY  Right 4/21/2023    Procedure: NEPHROSTOGRAM, ANTEGRADE;  Surgeon: Chirag Russ MD;  Location: [facility] OR 2ND FLR;  Service: Urology;  Laterality: Right;    ANTEGRADE NEPHROSTOGRAPHY Left 6/6/2023    Procedure: Nephrostogram - antegrade;  Surgeon: Leslie Balbuena MD;  Location: [facility] OR 1ST FLR;  Service: Urology;  Laterality: Left;    BILATERAL OOPHORECTOMY  2015    CHOLECYSTECTOMY  11/09/2016    Done at [facility], path showed chronic cholecystitis and gallstones    cold knife conization  2014    COLECTOMY, RIGHT  9/17/2020    Procedure: COLECTOMY, RIGHT Extended;  Surgeon: Hammad Reynolds MD;  Location: [facility] OR 2ND FLR;  Service: Colon and Rectal;;    COLONOSCOPY  2014    COLONOSCOPY N/A 10/24/2016    at [facility], Dr Gutiérrez    COLONOSCOPY N/A 5/18/2018    Procedure: COLONOSCOPY;  Surgeon: Arden Gutirérez MD;  Location: [facility] (4TH FLR);  Service: Endoscopy;  Laterality: N/A;    COLONOSCOPY N/A 7/28/2020    Procedure: COLONOSCOPY;  Surgeon: Hammad Reynolds MD;  Location: [facility] (4TH FLR);  Service: Colon and Rectal;  Laterality: N/A;  covid test [place] 7/25    CYSTOSCOPY WITH URETEROSCOPY, RETROGRADE PYELOGRAPHY, AND INSERTION OF STENT Bilateral 3/21/2020    Procedure: CYSTOSCOPY, WITH RETROGRADE PYELOGRAM,;  Surgeon: Leslie Balbuena MD;  Location: [facility] OR 1ST FLR;  Service: Urology;  Laterality: Bilateral;    DILATION OF NEPHROSTOMY TRACT Right 10/27/2022    Procedure: DILATION, NEPHROSTOMY TRACT;  Surgeon: Chirag Russ MD;  Location: [facility] OR 1ST FLR;  Service: Urology;  Laterality: Right;    ESOPHAGOGASTRODUODENOSCOPY  2014    ESOPHAGOGASTRODUODENOSCOPY  11/18/2020    ESOPHAGOGASTRODUODENOSCOPY N/A 11/18/2020    Procedure: ESOPHAGOGASTRODUODENOSCOPY (EGD);  Surgeon: Zenon Spencer MD;  Location: [facility]O;  Service: Endoscopy;  Laterality: N/A;    ESOPHAGOGASTRODUODENOSCOPY N/A 12/11/2020    Procedure: EGD (ESOPHAGOGASTRODUODENOSCOPY);  Surgeon: Juancho Muse MD;  Location: [facility]  (2ND FLR);  Service: Endoscopy;  Laterality: N/A;    EXCISION, SMALL INTESTINE  2/6/2024    Procedure: EXCISION, SMALL INTESTINE;  Surgeon: Hammad Reynolds MD;  Location: [facility] OR 2ND FLR;  Service: Colon and Rectal;;    HYSTERECTOMY  2014    [facility] for cervical cancer    ILEOSTOMY  9/17/2020    Procedure: CREATION, ILEOSTOMY;  Surgeon: Hammad Reynlods MD;  Location: [facility] OR 2ND FLR;  Service: Colon and Rectal;;    ILEOSTOMY CLOSURE N/A 2/6/2024    Procedure: CLOSURE, ILEOSTOMY;  Surgeon: Hammad Reynolds MD;  [facility];  Service: Colon and Rectal;  Laterality: N/A;    LAPAROTOMY, EXPLORATORY N/A 2/6/2024    Procedure: OPEN LAPAROTOMY, EXPLORATORY;  Surgeon: Leslie Balbuena MD;  [facility];  Service: Urology;  Laterality: N/A;  22 modifier    LOOPOGRAM N/A 4/20/2021    Procedure: LOOPOGRAM;  Surgeon: Leslie Balbuena MD;  [facility];  Service: Urology;  Laterality: N/A;    LOOPOGRAM N/A 6/6/2023    Procedure: LOOPOGRAM;  Surgeon: Leslie Balbuena MD;  [facility]ST [facility];  Service: Urology;  Laterality: N/A;    LYSIS OF ADHESIONS  2/6/2024    Procedure: LYSIS, ADHESIONS;  Surgeon: Leslie Balbuena MD;  [facility];  Service: Urology;;    LYSIS OF ADHESIONS  2/6/2024    Procedure: LYSIS, ADHESIONS;  Surgeon: Hammad Reynolds MD;  [facility];  Service: Colon and Rectal;;    MOBILIZATION OF SPLENIC FLEXURE N/A 9/11/2020    Procedure: MOBILIZATION, COLONIC;  Surgeon: Hammad Reynolds MD;  [facility];  Service: Colon and Rectal;  Laterality: N/A;    NEPHROSTOGRAPHY Bilateral 4/17/2021    Procedure: Nephrostogram;  Surgeon: Celeste Surgeon;  Location: [facility];  Service: Anesthesiology;  Laterality: Bilateral;    OOPHORECTOMY      PERCUTANEOUS NEPHROLITHOTOMY Right 4/21/2023    Procedure: NEPHROLITHOTOMY, PERCUTANEOUS;  Surgeon: Chirag Russ MD;  Location: [facility] OR [address] [place] [address];  Service: Urology;  Laterality: Right;  2.5 hrs     PERCUTANEOUS NEPHROSTOMY  4/21/2023    Procedure: CREATION, NEPHROSTOMY, PERCUTANEOUS with removal of existing nephrostomy tube;  Surgeon: Chirag Russ MD;  [facility];  Service: Urology;;    PYELOSCOPY Right 10/27/2022    Procedure: PYELOSCOPY;  Surgeon: Chirag Russ MD;  [facility];  Service: Urology;  Laterality: Right;    REPLACEMENT OF NEPHROSTOMY TUBE Right 10/27/2022    Procedure: REPLACEMENT, NEPHROSTOMY TUBE;  Surgeon: Chirag Russ MD;  Location: [facility] OR [address] [place] [address];  Service: Urology;  Laterality: Right;    RETROPERITONEAL LYMPHADENECTOMY Right 9/17/2018    Procedure: LYMPHADENECTOMY, RETROPERITONEUM;  Surgeon: Sebas Reed MD;  Location: [facility] OR [address] [place] [address];  Service: General;  Laterality: Right;  ILIAC    REVISION OF ANASTOMOSIS OF URETER TO ILEAL CONDUIT N/A 2/6/2024    Procedure: REVISION, ANASTOMOSIS, URETEROILEAL;  Surgeon: Leslie Balbuena MD;  Location: [facility] OR [address] [place] [address];  Service: Urology;  Laterality: N/A;    URETEROSCOPIC REMOVAL OF URETERIC CALCULUS Right 10/27/2022    Procedure: REMOVAL, CALCULUS, URETER, URETEROSCOPIC;  Surgeon: Chirag Russ MD;  Location: [facility] OR [address] [place] [address];  Service: Urology;  Laterality: Right;    URETEROSCOPY Right 10/27/2022    Procedure: URETEROSCOPY-ANTEGRADE;  Surgeon: Chirag Russ MD;  Location: [facility] OR [address] [place] [address];  Service: Urology;  Laterality: Right;     Family History   Problem Relation Name Age of Onset    Heart disease Sister      Heart disease Maternal Grandmother      Colon cancer Father      Esophageal cancer Father      Cancer Father          Lung-smoker    Cancer Mother          Cervical    Cervical cancer Mother      Breast cancer Maternal Aunt      Suicide Daughter Arquel         jumped from parking structure    Drug abuse Daughter Arquel     Drug abuse Daughter Arquet     Rectal cancer Neg Hx      Stomach cancer Neg Hx      Crohn's disease Neg Hx      Ulcerative colitis Neg Hx      Diabetes Neg Hx       Hypertension Neg Hx       Social History     Tobacco Use    Smoking status: Never    Smokeless tobacco: Never   Substance Use Topics    Alcohol use: No     Alcohol/week: 0.0 standard drinks of alcohol    Drug use: No       OBJECTIVE:     Vital Signs (Most Recent)  Temp: 99.3 °F (37.4 °C) (08/08/24 1110)  Pulse: 75 (08/08/24 1110)  Resp: 17 (08/08/24 1110)  BP: (!) 107/56 (08/08/24 1110)  SpO2: 97 % (08/08/24 1110)    Physical Exam:  Physical Exam  Constitutional:       Appearance: She is ill-appearing.   Pulmonary:      Effort: Pulmonary effort is normal. No respiratory distress.   Abdominal:      General: There is no distension.      Tenderness: There is abdominal tenderness.      Comments: Right flank pain, colostomy bag in lower abdomen   Musculoskeletal:      Right lower leg: No edema.      Left lower leg: No edema.   Neurological:      General: No focal deficit present.      Mental Status: She is alert and oriented to person, place, and time.   Psychiatric:         Mood and Affect: Mood normal.         Laboratory  I have reviewed all pertinent lab results within the past 24 hours.    ASA/Mallampati  ASA: 3  Mallampati: 3    Imaging:  Recent imaging studies including CT a/p on 8/7/24 which was independently reviewed by myself and IR team.     ASSESSMENT/PLAN:     Assessment:  59 y.o. female with a PMHx of cervical cancer s/p radiation and prior colonic resection who has been referred to IR for right nephrostomy tube placement for management of R sided hydronephrosis.The procedure was discussed in great detail with the patient including thorough explanations of the potential risks and benefits of nephrostomy tube placement. Risks include hematuria, pain, sepsis, injury to ureter or kidney, injury to adjacent organs, catheter dislodgement, inability to remove nephrostomy tube due to cystallization and need for additional procedures. Explained to pt that choosing to forgo procedure could result in threat to life or  bodily function.The patient is a candidate for right sided nephrostomy tube placement under moderate sedation. Plan discussed with ordering physician and pt who verbalized understanding of the plan and would like to proceed.    Plan:  Will proceed with right sided nephrostomy tube under moderate sedation on 8/8/24.   Anticoagulation history reviewed. Patient is not currently on anticoagulation medications.   If starting prophylactic AC following procedure, ok to start lovenox 12 hours following procedure and ok to start SQ heparin 6-8 hours following procedure per SIR guidelines  Coagulation labs reviewed. INR 1.0 and plt count 254.  Thank you for the consult. Please contact with questions via Epic secure chat or spectra.    Julia Thompson MD  Radiology, PGY IV  [facility]

## 2024-08-08 NOTE — SUBJECTIVE & OBJECTIVE
Past Medical History:   Diagnosis Date    Abnormal mammogram 08/25/2020    Abnormal mammogram 08/25/2020    Acute blood loss anemia     Acute deep vein thrombosis (DVT) of lower extremity 12/09/2020    Advance care planning 04/30/2021    ODESSA (acute kidney injury) 03/21/2020    Anemia due to chronic blood loss     Anemia due to chronic kidney disease     Anemia due to chronic kidney disease 05/18/2020    Anxiety     Bilateral ureteral obstruction 09/11/2020    Bilious vomiting with nausea 06/11/2023    Cardiovascular event risk -- low 09/14/2015    ASCVD 10-year risk 1.9% (with optimal risk factors 1.3%) as of 9/14/2015     Cervical cancer 2014    Chronic back pain     Colostomy care     Deep vein thrombosis     Depression     Diarrhea due to malabsorption 11/14/2018    Difficult intubation     Discharge planning issues 04/30/2021    Disorder of kidney and ureter     DVT of lower extremity, bilateral 11/04/2020    Edema 04/10/2023    Emphysema of lung 04/10/2023    Fibromyalgia     Fungemia 09/27/2020    Generalized abdominal pain 08/25/2020    GERD (gastroesophageal reflux disease)     Hemifacial spasm 09/16/2015    Hiatal hernia 2014    History of cervical cancer 10/11/2018    History of essential hypertension 05/04/2015    Not requiring medications at this time  BP is low- concern that this may correlate with increased stool output from stoma. Encourage her to contact GI and her PCP.    Hx of psychiatric care     Cymbalta, trazodone    Hypertension     Hypomagnesemia 11/21/2018    Hyponatremia 09/10/2023    Impaired functional mobility, balance, gait, and endurance 07/24/2015    Lactose intolerance     Major depressive disorder, recurrent episode, moderate 06/02/2023    Metastatic squamous cell carcinoma to lymph node 10/11/2018    Moderate episode of recurrent major depressive disorder 04/21/2021    Nephrostomy complication 10/26/2023    Neuropathy due to chemotherapeutic drug 11/14/2018    Osteoarthritis of back      Peritonitis 09/22/2020    Physical deconditioning 04/30/2021    Pseudomonas urinary tract infection 04/21/2021    Psychiatric problem     Pyelitis 09/09/2023    QT prolongation 04/16/2021    Refusal of blood transfusions as patient is Restorationism     Schatzki's ring 09/14/2015    Seen on outside EGD 05/2014, underwent esophageal dilatation. Bx were negative.     Seizure-like activity 12/02/2018    Seizures     Sleep stage dysfunction     Noted on PSG 06/2017; negative for obstructive sleep apnea     Stroke     Urinary tract infection associated with nephrostomy catheter 06/11/2020    Wound infection after surgery 09/24/2020       Past Surgical History:   Procedure Laterality Date    ANASTOMOSIS OF INTESTINE N/A 2/6/2024    Procedure: ANASTOMOSIS, INTESTINE - Ileocolic anastomosis;  Surgeon: Hammad Reynolds MD;  Location: [facility] OR 2ND FLR;  Service: Colon and Rectal;  Laterality: N/A;    ANTEGRADE NEPHROSTOGRAPHY Bilateral 9/28/2020    Procedure: Nephrostogram - antegrade;  Surgeon: Leslie Balbuena MD;  Location: [facility] OR 1ST FLR;  Service: Urology;  Laterality: Bilateral;    ANTEGRADE NEPHROSTOGRAPHY Left 4/20/2021    Procedure: NEPHROSTOGRAM, ANTEGRADE;  Surgeon: Leslie Balbuena MD;  Location: [facility] OR 1ST FLR;  Service: Urology;  Laterality: Left;    ANTEGRADE NEPHROSTOGRAPHY Right 10/27/2022    Procedure: NEPHROSTOGRAM, ANTEGRADE;  Surgeon: Chirag Russ MD;  Location: [facility] OR [facility] FLR;  Service: Urology;  Laterality: Right;    ANTEGRADE NEPHROSTOGRAPHY Right 4/21/2023    Procedure: NEPHROSTOGRAM, ANTEGRADE;  Surgeon: Chirag Russ MD;  Location: [facility] OR 2ND FLR;  Service: Urology;  Laterality: Right;    ANTEGRADE NEPHROSTOGRAPHY Left 6/6/2023    Procedure: Nephrostogram - antegrade;  Surgeon: Leslie Balbuena MD;  Location: [facility] OR 1ST FLR;  Service: Urology;  Laterality: Left;    BILATERAL OOPHORECTOMY  2015    CHOLECYSTECTOMY  11/09/2016    Done at [facility], path showed chronic  cholecystitis and gallstones    cold knife conization  2014    COLECTOMY, RIGHT  9/17/2020    Procedure: COLECTOMY, RIGHT Extended;  Surgeon: Hammad Reynolds MD;  Location: [facility] OR 2ND FLR;  Service: Colon and Rectal;;    COLONOSCOPY  2014    COLONOSCOPY N/A 10/24/2016    at [facility], Dr Gutiérrez    COLONOSCOPY N/A 5/18/2018    Procedure: COLONOSCOPY;  Surgeon: Arden Gutiérrez MD;  Location: [facility] (4TH FLR);  Service: Endoscopy;  Laterality: N/A;    COLONOSCOPY N/A 7/28/2020    Procedure: COLONOSCOPY;  Surgeon: Hammad Reynolds MD;  Location: [facility] (4TH FLR);  Service: Colon and Rectal;  Laterality: N/A;  covid test [place] 7/25    CYSTOSCOPY WITH URETEROSCOPY, RETROGRADE PYELOGRAPHY, AND INSERTION OF STENT Bilateral 3/21/2020    Procedure: CYSTOSCOPY, WITH RETROGRADE PYELOGRAM,;  Surgeon: Leslie Balbuena MD;  Location: [facility] OR 1ST FLR;  Service: Urology;  Laterality: Bilateral;    DILATION OF NEPHROSTOMY TRACT Right 10/27/2022    Procedure: DILATION, NEPHROSTOMY TRACT;  Surgeon: Chirag Russ MD;  Location: [facility] OR 1ST FLR;  Service: Urology;  Laterality: Right;    ESOPHAGOGASTRODUODENOSCOPY  2014    ESOPHAGOGASTRODUODENOSCOPY  11/18/2020    ESOPHAGOGASTRODUODENOSCOPY N/A 11/18/2020    Procedure: ESOPHAGOGASTRODUODENOSCOPY (EGD);  Surgeon: Zenon Spencer MD;  Location: [facility];  Service: Endoscopy;  Laterality: N/A;    ESOPHAGOGASTRODUODENOSCOPY N/A 12/11/2020    Procedure: EGD (ESOPHAGOGASTRODUODENOSCOPY);  Surgeon: Juancho Muse MD;  Location: [facility] ENDO (2ND FLR);  Service: Endoscopy;  Laterality: N/A;    EXCISION, SMALL INTESTINE  2/6/2024    Procedure: EXCISION, SMALL INTESTINE;  Surgeon: Hammad Reynolds MD;  Location: [facility] OR 2ND FLR;  Service: Colon and Rectal;;    HYSTERECTOMY  2014    [facility] for cervical [facility]    ILEOSTOMY  9/17/2020    Procedure: CREATION, ILEOSTOMY;  Surgeon: Hammad Reynolds MD;  Location: [facility] OR 2ND FLR;  Service: Colon and Rectal;;    ILEOSTOMY CLOSURE N/A 2/6/2024     Procedure: CLOSURE, ILEOSTOMY;  Surgeon: Hammad Reynolds MD;  [facility] FLR;  Service: Colon and Rectal;  Laterality: N/A;    LAPAROTOMY, EXPLORATORY N/A 2/6/2024    Procedure: OPEN LAPAROTOMY, EXPLORATORY;  Surgeon: Leslie Balbuena MD;  [facility] 2ND FLR;  Service: Urology;  Laterality: N/A;  22 modifier    LOOPOGRAM N/A 4/20/2021    Procedure: LOOPOGRAM;  Surgeon: Leslie Balbuena MD;  [facility] 1ST FLR;  Service: Urology;  Laterality: N/A;    LOOPOGRAM N/A 6/6/2023    Procedure: LOOPOGRAM;  Surgeon: Leslie Balbuena MD;  [facility] 1ST FLR;  Service: Urology;  Laterality: N/A;    LYSIS OF ADHESIONS  2/6/2024    Procedure: LYSIS, ADHESIONS;  Surgeon: Leslie Balbuena MD;  [facility] 2ND FLR;  Service: Urology;;    LYSIS OF ADHESIONS  2/6/2024    Procedure: LYSIS, ADHESIONS;  Surgeon: Hammad Reynolds MD;  [facility] 2ND FLR;  Service: Colon and Rectal;;    MOBILIZATION OF SPLENIC FLEXURE N/A 9/11/2020    Procedure: MOBILIZATION, COLONIC;  Surgeon: Hammad Reynolds MD;  [facility] 2ND FLR;  Service: Colon and Rectal;  Laterality: N/A;    NEPHROSTOGRAPHY Bilateral 4/17/2021    Procedure: Nephrostogram;  Surgeon: Celeste Surgeon;  Location: [facility];  Service: Anesthesiology;  Laterality: Bilateral;    OOPHORECTOMY      PERCUTANEOUS NEPHROLITHOTOMY Right 4/21/2023    Procedure: NEPHROLITHOTOMY, PERCUTANEOUS;  Surgeon: Chirag Russ MD;  Location: [facility] OR 2ND FLR;  Service: Urology;  Laterality: Right;  2.5 hrs    PERCUTANEOUS NEPHROSTOMY  4/21/2023    Procedure: CREATION, NEPHROSTOMY, PERCUTANEOUS with removal of existing nephrostomy tube;  Surgeon: Chirag Russ MD;  Location: [facility] OR 2ND FLR;  Service: Urology;;    PYELOSCOPY Right 10/27/2022    Procedure: PYELOSCOPY;  Surgeon: Chirag Russ MD;  Location: [facility] OR 1ST FLR;  Service: Urology;  Laterality: Right;    REPLACEMENT OF NEPHROSTOMY TUBE Right 10/27/2022    Procedure: REPLACEMENT,  NEPHROSTOMY TUBE;  Surgeon: Chirag Russ MD;  Location: [facility] OR 1ST FLR;  Service: Urology;  Laterality: Right;    RETROPERITONEAL LYMPHADENECTOMY Right 9/17/2018    Procedure: LYMPHADENECTOMY, RETROPERITONEUM;  Surgeon: Sebas Reed MD;  Location: [facility] OR 2ND FLR;  Service: General;  Laterality: Right;  ILIAC    REVISION OF ANASTOMOSIS OF URETER TO ILEAL CONDUIT N/A 2/6/2024    Procedure: REVISION, ANASTOMOSIS, URETEROILEAL;  Surgeon: Leslie Balbuena MD;  Location: [facility] OR 2ND FLR;  Service: Urology;  Laterality: N/A;    URETEROSCOPIC REMOVAL OF URETERIC CALCULUS Right 10/27/2022    Procedure: REMOVAL, CALCULUS, URETER, URETEROSCOPIC;  Surgeon: Chirag Russ MD;  [facility];  Service: Urology;  Laterality: Right;    URETEROSCOPY Right 10/27/2022    Procedure: URETEROSCOPY-ANTEGRADE;  Surgeon: Chirag Russ MD;  [facility];  Service: Urology;  Laterality: Right;       Review of patient's allergies indicates:   Allergen Reactions    Bee sting [allergen ext-venom-honey bee]      Rash      Grass pollen-bermuda, standard      rash       Family History       Problem Relation (Age of Onset)    Breast cancer Maternal Aunt    Cancer Father, Mother    Cervical cancer Mother    Colon cancer Father    Drug abuse Daughter, Daughter    Esophageal cancer Father    Heart disease Sister, Maternal Grandmother    Suicide Daughter            Tobacco Use    Smoking status: Never    Smokeless tobacco: Never   Substance and Sexual Activity    Alcohol use: No     Alcohol/week: 0.0 standard drinks of alcohol    Drug use: No    Sexual activity: Yes     Partners: Male     Birth control/protection: None     Comment:  17 years since 1999           Objective:     Temp:  [98.5 °F (36.9 °C)-98.6 °F (37 °C)] 98.5 °F (36.9 °C)  Pulse:  [76-95] 76  Resp:  [15-18] 18  SpO2:  [98 %] 98 %  BP: (121-126)/(59-78) 121/78  Weight: 83.9 kg (185 lb)  Body mass index is 27.32 kg/m².    Date  08/07/24 0700 - 08/08/24 0659   Shift 0472-9984 5581-6145 3335-1461 24 Hour Total   INTAKE   IV Piggyback  1100  1100   Shift Total(mL/kg)  1100(13.1)  1100(13.1)   OUTPUT   Shift Total(mL/kg)       Weight (kg)  83.9 83.9 83.9          Drains       Drain  Duration                  Urostomy 02/06/24 1815 ureterostomy, left 183 days         Urostomy 02/25/24 1600  days                     Physical Exam  Constitutional:       General: She is not in acute distress.     Appearance: Normal appearance.   HENT:      Head: Normocephalic and atraumatic.      Nose: Nose normal.   Eyes:      Conjunctiva/sclera: Conjunctivae normal.   Cardiovascular:      Rate and Rhythm: Normal rate.   Pulmonary:      Effort: Pulmonary effort is normal. No respiratory distress.   Abdominal:      General: There is no distension.      Comments: LLQ ileostomy, ostomy pink and patent, appears healthy  Clear yellow urine in bag  Abd soft, non tender, non distended  Mild right flank tenderness, no left sided flank tenderness    Musculoskeletal:         General: No deformity.   Skin:     General: Skin is warm and dry.   Neurological:      General: No focal deficit present.      Mental Status: She is alert.   Psychiatric:         Mood and Affect: Mood normal.         Behavior: Behavior normal.          Significant Labs:    BMP:  Recent Labs   Lab 08/07/24  1644      K 4.2   *   CO2 18*   BUN 16   CREATININE 2.0*   CALCIUM 9.8       CBC:  Recent Labs   Lab 08/07/24  1644 08/07/24  1651   WBC 11.66  --    HGB 11.9*  --    HCT 40.7 39     --        All pertinent labs results from the past 24 hours have been reviewed.    Significant Imaging:  All pertinent imaging results/findings from the past 24 hours have been reviewed.

## 2024-08-08 NOTE — PLAN OF CARE
Pt arrived to IR for Rt neph tube placement. Pt oriented to unit and staff. Plan of care reviewed with patient, patient verbalizes understanding. Comfort measures utilized. Pt safely transferred from stretcher to procedural table. Fall risk reviewed with patient, fall risk interventions maintained. Safety strap applied, positioner pillows utilized to minimize pressure points. Blankets applied. Pt prepped and draped utilizing standard sterile technique. Patient placed on continuous monitoring, as required by sedation policy. Timeouts completed utilizing standard universal time-out, per department and facility policy. RN to remain at bedside, continuous monitoring maintained. Pt resting comfortably. Denies pain/discomfort. Will continue to monitor. See flow sheets for monitoring, medication administration, and updates.

## 2024-08-08 NOTE — NURSING
Nurses Note -- 4 Eyes      8/8/2024   12:17 AM      Skin assessed during: Admit      [] No Altered Skin Integrity Present    []Prevention Measures Documented      [x] Yes- Altered Skin Integrity Present or Discovered   [x] LDA Added if Not in Epic (Urostomy)     [] New Altered Skin Integrity was Present on Admit and Documented in LDA   [] Wound Image Taken    Wound Care Consulted? No    Attending Nurse:  Magaly Lovell RN/Staff Member:  Magaly Hawkins RN and Teresa Ramirez RN

## 2024-08-08 NOTE — NURSING
..Nurses Note -- 4 Eyes      8/8/2024   5:50 PM      Skin assessed during: Transfer      [] No Altered Skin Integrity Present    []Prevention Measures Documented      [x] Yes- Altered Skin Integrity Present or Discovered   [] LDA Added if Not in Epic (Describe Wound)   [x] New Altered Skin Integrity was Present on Admit and Documented in LDA   [] Wound Image Taken    Wound Care Consulted? No    Attending Nurse:  Jacquelin Lovell RN/Staff Member:  Inna

## 2024-08-08 NOTE — PROCEDURES
VIR Post-Procedure Note    Pre Op Diagnosis:  RIght hydronephrosis (uretero-iliac conduit anastomotic obstruction)  Post Op Diagnosis:  Same    Procedure: Right percutaneous nephrostomy tube placement    Procedure performed by: Blanche Villasenor MD     Written Informed Consent Obtained: Yes    Specimen Removed: No    Estimated Blood Loss: Minimal    Findings:     Right nephrostogram showed severe hydronephrosis with obstruction at the anastomosis of iliac conduit.      Successful placement of 10 Fr nephrostomy tube.      Plan:     Place tube to bag drainage, expect blood to gradually clears over 2-3 days.     Routine exchange by IR every 3 months unless another intervention performed earlier.          Blanche Villasenor MD  VIR

## 2024-08-08 NOTE — PLAN OF CARE
[facility] - [facility] ([place]-)  Initial Discharge Assessment      Discharge Plan A and Plan B have been determined by review of patient's clinical status, future medical and therapeutic needs, and coverage/benefits for post-acute care in coordination with multidisciplinary team members.      Primary Care Provider: Trina Vu MD    Admission Diagnosis: Acute kidney injury [N17.9]  Hydronephrosis, unspecified hydronephrosis type [N13.30]    Admission Date: 8/7/2024  Expected Discharge Date: 8/10/2024    Transition of Care Barriers: None    Payor: MEDICAID / Plan: [employer] [facility] ([employer]) / Product Type: Managed Medicaid /     Extended Emergency Contact Information  Primary Emergency Contact: Hammad Riley  Address: [address]           [place] [address] [address]  Home Phone: [phone]  Work Phone: [phone]  Mobile Phone: [phone]  Relation: Spouse  Secondary Emergency Contact: Federico Riley  Mobile Phone: [phone]  Relation: Daughter    Discharge Plan A: Home with family  Discharge Plan B: Home      [id number] [place] - [address]  [address]  [place] [address]  Phone: [phone] Fax: [phone]    [facility]  [address]  [facility] [address]  Phone: [phone] Fax: [phone]      Initial Assessment (most recent)       Adult Discharge Assessment - 08/08/24 1326          Discharge Assessment    Assessment Type Discharge Planning Assessment     Confirmed/corrected address, phone number and insurance Yes     Confirmed Demographics Correct on Facesheet     Source of Information patient     When was your last doctors appointment? 06/04/24   Trina Vu    Communicated OK with patient/caregiver Yes     Reason For Admission ODESSA (acute kidney injury)     People in Home spouse     Facility Arrived From: home     Do you expect to return to your current living  situation? Yes     Do you have help at home or someone to help you manage your care at home? Yes     Who are your caregiver(s) and their phone number(s)? Hammad Riley (Spouse)  [phone] (Mobile)     Prior to hospitilization cognitive status: Alert/Oriented;No Deficits     Current cognitive status: Alert/Oriented;No Deficits     Walking or Climbing Stairs Difficulty no     Dressing/Bathing Difficulty no     Home Accessibility wheelchair accessible;stairs to enter home     Number of Stairs, Main Entrance four     Equipment Currently Used at Home none     Readmission within 30 days? No     Patient currently being followed by outpatient case management? No     Do you currently have service(s) that help you manage your care at home? No     Do you take prescription medications? Yes     Do you have prescription coverage? Yes     Coverage MEDICAID - [facility] ([facility] -     Do you have any problems affording any of your prescribed medications? No     Is the patient taking medications as prescribed? yes     Who is going to help you get home at discharge? Hammad Riley (Spouse)  [phone] (Mobile)     How do you get to doctors appointments? family or friend will provide     Are you on dialysis? No     Do you take coumadin? No     Discharge Plan A Home with family     Discharge Plan B Home     DME Needed Upon Discharge  none     Discharge Plan discussed with: Patient     Transition of Care Barriers None        Physical Activity    On average, how many days per week do you engage in moderate to strenuous exercise (like a brisk walk)? 7 days     On average, how many minutes do you engage in exercise at this level? 30 min        Financial Resource Strain    How hard is it for you to pay for the very basics like food, housing, medical care, and heating? Not hard at all        Housing Stability    In the last 12 months, was there a time when you were not able to pay the mortgage or rent on  time? No     At any time in the past 12 months, were you homeless or living in a shelter (including now)? No        Transportation Needs    Has the lack of transportation kept you from medical appointments, meetings, work or from getting things needed for daily living? No        Food Insecurity    Within the past 12 months, you worried that your food would run out before you got the money to buy more. Never true     Within the past 12 months, the food you bought just didn't last and you didn't have money to get more. Never true        Stress    Do you feel stress - tense, restless, nervous, or anxious, or unable to sleep at night because your mind is troubled all the time - these days? Not at all        Social Isolation    How often do you feel lonely or isolated from those around you?  Never        Alcohol Use    Q1: How often do you have a drink containing alcohol? Never     Q2: How many drinks containing alcohol do you have on a typical day when you are drinking? Patient does not drink     Q3: How often do you have six or more drinks on one occasion? Never        [facility]    In the past 12 months has the electric, gas, oil, or water company threatened to shut off services in your home? No        Health Literacy    How often do you need to have someone help you when you read instructions, pamphlets, or other written material from your doctor or pharmacy? Never        OTHER    Name(s) of People in Home Hammad Riley (Spouse)  [phone] (Mobile)                          CM met with patient at bedside to complete discharge planning assessment.  Patient alert and oriented xs 4.  Patient verified all demographic information on facesheet is correct.  Patient verified PCP is Trina Nuñez MD    Patient verified primary health insurance is [place] Medicaid.--  MEDICAID - [facility] ([facility])  Patient is agreeable with bedside medication delivery.   Patient is not active with  home health   (declines HH) and has listed DME.  Patient with NO POA or Living Will.  Patient not on dialysis or medication coumadin.  Patient with no 30 day admission.  Patient with no financial issues at this time.  Patient spouse  will provide transportation upon discharge from facility.  Patient will have assistance from her spouse  upon discharge Patient independent with ADLs.  All questions answered regarding Case Management Discharge Planning, patient verbalized understanding.  Discharge booklet with CM's contact information given to patient.          Kiki Peterson RN  Case Management  [facility]  [phone]

## 2024-08-08 NOTE — PLAN OF CARE
8/7 PM  Patient is a 60 YO female, A&Ox4, admitted with c/o right flank/abd pain and nausea, dx ODESSA and hydronephrosis. CT + for hydronephrosis and 6 mm kidney stone. Patient is NPO for procedure 8/8 for plans for ureteroscopy with possible intervention. Patient is on RA, VSS, LLQ urostomy draining clear yellow urine, has NS at 75 ml/hr. Still has c/o right sided abd pain 8/10. IV dilaudid given PRN per MD order. Patient is a Rastafarian and will not like blood transfusion if neccessary.  Plan of care ongoing.      Problem: Adult Inpatient Plan of Care  Goal: Plan of Care Review  Outcome: Progressing  Goal: Patient-Specific Goal (Individualized)  Outcome: Progressing  Goal: Absence of Hospital-Acquired Illness or Injury  Outcome: Progressing  Goal: Optimal Comfort and Wellbeing  Outcome: Progressing  Goal: Readiness for Transition of Care  Outcome: Progressing     Problem: Acute Kidney Injury/Impairment  Goal: Fluid and Electrolyte Balance  Outcome: Progressing  Goal: Improved Oral Intake  Outcome: Progressing  Goal: Effective Renal Function  Outcome: Progressing     Problem: Wound  Goal: Optimal Coping  Outcome: Progressing  Goal: Optimal Functional Ability  Outcome: Progressing  Goal: Absence of Infection Signs and Symptoms  Outcome: Progressing  Goal: Improved Oral Intake  Outcome: Progressing  Goal: Optimal Pain Control and Function  Outcome: Progressing  Goal: Skin Health and Integrity  Outcome: Progressing  Goal: Optimal Wound Healing  Outcome: Progressing

## 2024-08-08 NOTE — PLAN OF CARE
10Fr right neph tube placement completed, pt tolerated well. No apparent distress noted. No s/s of complications noted. Dressing applied CDI. Pt to be transferred to MPU for recovery; report to be given at bedside.     Report called to AMY Keith.

## 2024-08-08 NOTE — CONSULTS
[facility]  Urology  Consult Note    Patient Name: Edita Riley  MRN: [id number]  Admission Date: 8/7/2024  Hospital Length of Stay: 0   Code Status: Prior   Attending Provider: Jian Li MD   Consulting Provider: Marlo Gabriel MD  Primary Care Physician: Trina Vu MD  Principal Problem:<principal problem not specified>    Inpatient consult to Urology  Consult performed by: Marlo Gabriel MD  Consult ordered by: Jian Li MD          Subjective:     HPI:  Edita Riley is a 59 y.o. female who has a history of pelvic irradiation with ureteral strictures bilaterally.  History of colo-colonic anastomotic leak and peritonitis following colonic urinary conduit creation, s/p extended right colectomy with end ileostomy and left ureter-colonic conduit anastomotic stricture.  ft ureteral re implant to a transverse colon conduit on 2/6/2024, lysis of adhesions, takedown of ileostomy with ileosigmoid anastamosis.     She had severe right sided flank pain at 3PM on 8/7 with associated n/v, denies f/c, prompting her to go to ED. In the ED she has been AFVSS, WBC wnl, Cr 2.0 (baseline ~1.2). CT non con showing worsening right sided hydronephrosis from prior scan, and small slightly radiopaque mass in distal ureter, likely debris. No left sided ureteral stones. Urology consulted for recommendations.     Past Medical History:   Diagnosis Date    Abnormal mammogram 08/25/2020    Abnormal mammogram 08/25/2020    Acute blood loss anemia     Acute deep vein thrombosis (DVT) of lower extremity 12/09/2020    Advance care planning 04/30/2021    ODESSA (acute kidney injury) 03/21/2020    Anemia due to chronic blood loss     Anemia due to chronic kidney disease     Anemia due to chronic kidney disease 05/18/2020    Anxiety     Bilateral ureteral obstruction 09/11/2020    Bilious vomiting with nausea 06/11/2023    Cardiovascular event risk -- low 09/14/2015    ASCVD 10-year risk  1.9% (with optimal risk factors 1.3%) as of 9/14/2015     Cervical cancer 2014    Chronic back pain     Colostomy care     Deep vein thrombosis     Depression     Diarrhea due to malabsorption 11/14/2018    Difficult intubation     Discharge planning issues 04/30/2021    Disorder of kidney and ureter     DVT of lower extremity, bilateral 11/04/2020    Edema 04/10/2023    Emphysema of lung 04/10/2023    Fibromyalgia     Fungemia 09/27/2020    Generalized abdominal pain 08/25/2020    GERD (gastroesophageal reflux disease)     Hemifacial spasm 09/16/2015    Hiatal hernia 2014    History of cervical cancer 10/11/2018    History of essential hypertension 05/04/2015    Not requiring medications at this time  BP is low- concern that this may correlate with increased stool output from stoma. Encourage her to contact GI and her PCP.    Hx of psychiatric care     Cymbalta, trazodone    Hypertension     Hypomagnesemia 11/21/2018    Hyponatremia 09/10/2023    Impaired functional mobility, balance, gait, and endurance 07/24/2015    Lactose intolerance     Major depressive disorder, recurrent episode, moderate 06/02/2023    Metastatic squamous cell carcinoma to lymph node 10/11/2018    Moderate episode of recurrent major depressive disorder 04/21/2021    Nephrostomy complication 10/26/2023    Neuropathy due to chemotherapeutic drug 11/14/2018    Osteoarthritis of back     Peritonitis 09/22/2020    Physical deconditioning 04/30/2021    Pseudomonas urinary tract infection 04/21/2021    Psychiatric problem     Pyelitis 09/09/2023    QT prolongation 04/16/2021    Refusal of blood transfusions as patient is Confucianism     Schatzki's ring 09/14/2015    Seen on outside EGD 05/2014, underwent esophageal dilatation. Bx were negative.     Seizure-like activity 12/02/2018    Seizures     Sleep stage dysfunction     Noted on PSG 06/2017; negative for obstructive sleep apnea     Stroke     Urinary tract infection associated with  nephrostomy catheter 06/11/2020    Wound infection after surgery 09/24/2020       Past Surgical History:   Procedure Laterality Date    ANASTOMOSIS OF INTESTINE N/A 2/6/2024    Procedure: ANASTOMOSIS, INTESTINE - Ileocolic anastomosis;  Surgeon: Hammad Reynolds MD;  Location: [facility] OR 2ND FLR;  Service: Colon and Rectal;  Laterality: N/A;    ANTEGRADE NEPHROSTOGRAPHY Bilateral 9/28/2020    Procedure: Nephrostogram - antegrade;  Surgeon: Leslie Balbuena MD;  Location: [facility] OR 1ST FLR;  Service: Urology;  Laterality: Bilateral;    ANTEGRADE NEPHROSTOGRAPHY Left 4/20/2021    Procedure: NEPHROSTOGRAM, ANTEGRADE;  Surgeon: Leslie Balbuena MD;  Location: [facility] OR 1ST FLR;  Service: Urology;  Laterality: Left;    ANTEGRADE NEPHROSTOGRAPHY Right 10/27/2022    Procedure: NEPHROSTOGRAM, ANTEGRADE;  Surgeon: Chirag Russ MD;  Location: [facility] OR 1ST FLR;  Service: Urology;  Laterality: Right;    ANTEGRADE NEPHROSTOGRAPHY Right 4/21/2023    Procedure: NEPHROSTOGRAM, ANTEGRADE;  Surgeon: Chirag Russ MD;  Location: [facility] OR 2ND FLR;  Service: Urology;  Laterality: Right;    ANTEGRADE NEPHROSTOGRAPHY Left 6/6/2023    Procedure: Nephrostogram - antegrade;  Surgeon: Leslie Balbuena MD;  Location: [facility] OR 1ST FLR;  Service: Urology;  Laterality: Left;    BILATERAL OOPHORECTOMY  2015    CHOLECYSTECTOMY  11/09/2016    Done at [facility], path showed chronic cholecystitis and gallstones    cold knife conization  2014    COLECTOMY, RIGHT  9/17/2020    Procedure: COLECTOMY, RIGHT Extended;  Surgeon: Hammad Reynolds MD;  Location: [facility] OR 2ND FLR;  Service: Colon and Rectal;;    COLONOSCOPY  2014    COLONOSCOPY N/A 10/24/2016    at [facility], Dr Thomas    COLONOSCOPY N/A 5/18/2018    Procedure: COLONOSCOPY;  Surgeon: Arden Gutiérrez MD;  Location: [facility] ENDO (4TH FLR);  Service: Endoscopy;  Laterality: N/A;    COLONOSCOPY N/A 7/28/2020    Procedure: COLONOSCOPY;  Surgeon: Hammad Reynolds MD;  Location: [facility] ENDO (4TH FLR);   Service: Colon and Rectal;  Laterality: N/A;  covid test [place] 7/25    CYSTOSCOPY WITH URETEROSCOPY, RETROGRADE PYELOGRAPHY, AND INSERTION OF STENT Bilateral 3/21/2020    Procedure: CYSTOSCOPY, WITH RETROGRADE PYELOGRAM,;  Surgeon: Leslie Balbuena MD;  Location: [facility] OR 1ST FLR;  Service: Urology;  Laterality: Bilateral;    DILATION OF NEPHROSTOMY TRACT Right 10/27/2022    Procedure: DILATION, NEPHROSTOMY TRACT;  Surgeon: Chirag Russ MD;  Location: [facility] OR 1ST FLR;  Service: Urology;  Laterality: Right;    ESOPHAGOGASTRODUODENOSCOPY  2014    ESOPHAGOGASTRODUODENOSCOPY  11/18/2020    ESOPHAGOGASTRODUODENOSCOPY N/A 11/18/2020    Procedure: ESOPHAGOGASTRODUODENOSCOPY (EGD);  Surgeon: Zenon Spencer MD;  Location: [facility];  Service: Endoscopy;  Laterality: N/A;    ESOPHAGOGASTRODUODENOSCOPY N/A 12/11/2020    Procedure: EGD (ESOPHAGOGASTRODUODENOSCOPY);  Surgeon: Juancho Muse MD;  Location: [facility] (2ND FLR);  Service: Endoscopy;  Laterality: N/A;    EXCISION, SMALL INTESTINE  2/6/2024    Procedure: EXCISION, SMALL INTESTINE;  Surgeon: Hammad Reynolds MD;  Location: [facility] OR 2ND FLR;  Service: Colon and Rectal;;    HYSTERECTOMY  2014    [facility] for cervical cancer    ILEOSTOMY  9/17/2020    Procedure: CREATION, ILEOSTOMY;  Surgeon: Hammad Reynolds MD;  Location: [facility] OR 2ND FLR;  Service: Colon and Rectal;;    ILEOSTOMY CLOSURE N/A 2/6/2024    Procedure: CLOSURE, ILEOSTOMY;  Surgeon: Hammad Reynolds MD;  Location: [facility] OR 2ND FLR;  Service: Colon and Rectal;  Laterality: N/A;    LAPAROTOMY, EXPLORATORY N/A 2/6/2024    Procedure: OPEN LAPAROTOMY, EXPLORATORY;  Surgeon: Leslie Balbuena MD;  Location: [facility] OR 2ND FLR;  Service: Urology;  Laterality: N/A;  22 modifier    LOOPOGRAM N/A 4/20/2021    Procedure: LOOPOGRAM;  Surgeon: Leslie Balbuena MD;  Location: [facility] OR 1ST FLR;  Service: Urology;  Laterality: N/A;    LOOPOGRAM N/A 6/6/2023    Procedure: LOOPOGRAM;  Surgeon: Leslie Balbuena MD;   Location: [facility] OR 1ST FLR;  Service: Urology;  Laterality: N/A;    LYSIS OF ADHESIONS  2/6/2024    Procedure: LYSIS, ADHESIONS;  Surgeon: Leslie Balbuena MD;  Location: [facility] OR 2ND FLR;  Service: Urology;;    LYSIS OF ADHESIONS  2/6/2024    Procedure: LYSIS, ADHESIONS;  Surgeon: Hammad Reynolds MD;  Location: [facility] OR 2ND FLR;  Service: Colon and Rectal;;    MOBILIZATION OF SPLENIC FLEXURE N/A 9/11/2020    Procedure: MOBILIZATION, COLONIC;  Surgeon: Hammad Reynolds MD;  Location: [facility] OR 2ND FLR;  Service: Colon and Rectal;  Laterality: N/A;    NEPHROSTOGRAPHY Bilateral 4/17/2021    Procedure: Nephrostogram;  Surgeon: Celeste Surgeon;  Location: [facility];  Service: Anesthesiology;  Laterality: Bilateral;    OOPHORECTOMY      PERCUTANEOUS NEPHROLITHOTOMY Right 4/21/2023    Procedure: NEPHROLITHOTOMY, PERCUTANEOUS;  Surgeon: Chirag Russ MD;  [facility];  Service: Urology;  Laterality: Right;  2.5 hrs    PERCUTANEOUS NEPHROSTOMY  4/21/2023    Procedure: CREATION, NEPHROSTOMY, PERCUTANEOUS with removal of existing nephrostomy tube;  Surgeon: Chirag Russ MD;  [facility];  Service: Urology;;    PYELOSCOPY Right 10/27/2022    Procedure: PYELOSCOPY;  Surgeon: Chiarg Russ MD;  [facility];  Service: Urology;  Laterality: Right;    REPLACEMENT OF NEPHROSTOMY TUBE Right 10/27/2022    Procedure: REPLACEMENT, NEPHROSTOMY TUBE;  Surgeon: Chirag Russ MD;  [facility];  Service: Urology;  Laterality: Right;    RETROPERITONEAL LYMPHADENECTOMY Right 9/17/2018    Procedure: LYMPHADENECTOMY, RETROPERITONEUM;  Surgeon: Sebas Reed MD;  [facility];  Service: General;  Laterality: Right;  ILIAC    REVISION OF ANASTOMOSIS OF URETER TO ILEAL CONDUIT N/A 2/6/2024    Procedure: REVISION, ANASTOMOSIS, URETEROILEAL;  Surgeon: Leslie Balbuena MD;  Location: [facility] OR [facility] FLR;  Service: Urology;  Laterality: N/A;    URETEROSCOPIC REMOVAL OF  URETERIC CALCULUS Right 10/27/2022    Procedure: REMOVAL, CALCULUS, URETER, URETEROSCOPIC;  Surgeon: Chirag Russ MD;  Location: [facility] OR [address] [place] [address];  Service: Urology;  Laterality: Right;    URETEROSCOPY Right 10/27/2022    Procedure: URETEROSCOPY-ANTEGRADE;  Surgeon: Chirag Russ MD;  Location: [facility] OR [address] [place] [address];  Service: Urology;  Laterality: Right;       Review of patient's allergies indicates:   Allergen Reactions    Bee sting [allergen ext-venom-honey bee]      Rash      Grass pollen-bermuda, standard      rash       Family History       Problem Relation (Age of Onset)    Breast cancer Maternal Aunt    Cancer Father, Mother    Cervical cancer Mother    Colon cancer Father    Drug abuse Daughter, Daughter    Esophageal cancer Father    Heart disease Sister, Maternal Grandmother    Suicide Daughter            Tobacco Use    Smoking status: Never    Smokeless tobacco: Never   Substance and Sexual Activity    Alcohol use: No     Alcohol/week: 0.0 standard drinks of alcohol    Drug use: No    Sexual activity: Yes     Partners: Male     Birth control/protection: None     Comment:  17 years since 1999           Objective:     Temp:  [98.5 °F (36.9 °C)-98.6 °F (37 °C)] 98.5 °F (36.9 °C)  Pulse:  [76-95] 76  Resp:  [15-18] 18  SpO2:  [98 %] 98 %  BP: (121-126)/(59-78) 121/78  Weight: 83.9 kg (185 lb)  Body mass index is 27.32 kg/m².    Date 08/07/24 0700 - 08/08/24 0659   Shift 2913-9718 6886-9237 2111-6904 24 Hour Total   INTAKE   IV Piggyback  1100  1100   Shift Total(mL/kg)  1100(13.1)  1100(13.1)   OUTPUT   Shift Total(mL/kg)       Weight (kg)  83.9 83.9 83.9          Drains       Drain  Duration                  Urostomy 02/06/24 1815 ureterostomy, left 183 days         Urostomy 02/25/24 1600  days                     Physical Exam  Constitutional:       General: She is not in acute distress.     Appearance: Normal appearance.   HENT:      Head: Normocephalic and atraumatic.      Nose: Nose  normal.   Eyes:      Conjunctiva/sclera: Conjunctivae normal.   Cardiovascular:      Rate and Rhythm: Normal rate.   Pulmonary:      Effort: Pulmonary effort is normal. No respiratory distress.   Abdominal:      General: There is no distension.      Comments: LLQ ileostomy, ostomy pink and patent, appears healthy  Clear yellow urine in bag  Abd soft, non tender, non distended  Mild right flank tenderness, no left sided flank tenderness    Musculoskeletal:         General: No deformity.   Skin:     General: Skin is warm and dry.   Neurological:      General: No focal deficit present.      Mental Status: She is alert.   Psychiatric:         Mood and Affect: Mood normal.         Behavior: Behavior normal.          Significant Labs:    BMP:  Recent Labs   Lab 08/07/24  1644      K 4.2   *   CO2 18*   BUN 16   CREATININE 2.0*   CALCIUM 9.8       CBC:  Recent Labs   Lab 08/07/24  1644 08/07/24  1651   WBC 11.66  --    HGB 11.9*  --    HCT 40.7 39     --        All pertinent labs results from the past 24 hours have been reviewed.    Significant Imaging:  All pertinent imaging results/findings from the past 24 hours have been reviewed.                    Assessment and Plan:     Ileostomy in place  59yoF w/complex urological history currently with ileostomy in place with concern for right sided hydro and ODESSA     - Appears there may be small debris in right distal ureter near ureteroileal anastomosis   - Right sided hydronephrosis slightly worsened from prior scan   - Currently low concern for infection    - IR consult for placement of right nephrostomy tube   - Will obtain UA/UCx at time of nephrostomy tube placement, will treat if needed ahead of urologic procedure   - Plan for antegrade ureteroscopy to evaluate right ureter after NT placement   - Recommend admission to hospital medicine for management of comorbidities   - Flomax    - Recommend IVF   - OK for diet today, NPO at midnight   - Please reach  out to urology team if patient's clinical status changes    Please feel free to reach out with any questions.         VTE Risk Mitigation (From admission, onward)      None            Thank you for your consult. I will follow-up with patient. Please contact us if you have any additional questions.    Marlo Gabriel MD  Urology  [facility]

## 2024-08-08 NOTE — PLAN OF CARE
Pt npo most of this shift for procedure today . Pt had 10 fr nephrostomy tube placed to right kidney. Pt now on regular diet. Fall precautions in place. Plan of care discussed with, pt understands. Plan of care ongoing     Problem: Adult Inpatient Plan of Care  Goal: Plan of Care Review  8/8/2024 1850 by Jacquelin Link LPN  Outcome: Progressing  8/8/2024 1759 by Jacquelin Link LPN  Outcome: Progressing  Goal: Patient-Specific Goal (Individualized)  8/8/2024 1850 by Jacquelin Link LPN  Outcome: Progressing  8/8/2024 1759 by Jacquelin Link LPN  Outcome: Progressing  Goal: Absence of Hospital-Acquired Illness or Injury  8/8/2024 1850 by Jacquelin Link LPN  Outcome: Progressing  8/8/2024 1759 by Jacquelin Link LPN  Outcome: Progressing  Goal: Optimal Comfort and Wellbeing  8/8/2024 1850 by Jacquelin Link LPN  Outcome: Progressing  8/8/2024 1759 by Jacquelin Link LPN  Outcome: Progressing  Goal: Readiness for Transition of Care  8/8/2024 1850 by Jacquelin Link LPN  Outcome: Progressing  8/8/2024 1759 by Jacquelin Link LPN  Outcome: Progressing     Problem: Acute Kidney Injury/Impairment  Goal: Fluid and Electrolyte Balance  8/8/2024 1850 by Jacquelin Link LPN  Outcome: Progressing  8/8/2024 1759 by Jacquelin Link LPN  Outcome: Progressing  Goal: Improved Oral Intake  8/8/2024 1850 by Jacquelin Link LPN  Outcome: Progressing  8/8/2024 1759 by Jacquelin Link LPN  Outcome: Progressing  Goal: Effective Renal Function  8/8/2024 1850 by Jacquelin Link LPN  Outcome: Progressing  8/8/2024 1759 by Jacquelin Link LPN  Outcome: Progressing     Problem: Wound  Goal: Optimal Coping  8/8/2024 1850 by Jacquelin Link LPN  Outcome: Progressing  8/8/2024 1759 by Jacquelin Link LPN  Outcome: Progressing  Goal: Optimal Functional Ability  8/8/2024 1850 by Jacquelin Link LPN  Outcome: Progressing  8/8/2024 1759 by Jacquelin Link LPN  Outcome: Progressing  Goal: Absence of Infection Signs and Symptoms  8/8/2024 1850 by  Link, Jacquelin, LPN  Outcome: Progressing  8/8/2024 1759 by Jacquelin Link LPN  Outcome: Progressing  Goal: Improved Oral Intake  8/8/2024 1850 by Jacquelin Link LPN  Outcome: Progressing  8/8/2024 1759 by Jacquelin Link LPN  Outcome: Progressing  Goal: Optimal Pain Control and Function  8/8/2024 1850 by Jacquelin Link LPN  Outcome: Progressing  8/8/2024 1759 by Jacquelin Link LPN  Outcome: Progressing  Goal: Skin Health and Integrity  8/8/2024 1850 by Jacquelin Link LPN  Outcome: Progressing  8/8/2024 1759 by Jacquelin Link LPN  Outcome: Progressing  Goal: Optimal Wound Healing  8/8/2024 1850 by Jacquelin Link LPN  Outcome: Progressing  8/8/2024 1759 by Jacquelin Link LPN  Outcome: Progressing

## 2024-08-08 NOTE — SUBJECTIVE & OBJECTIVE
Past Medical History:   Diagnosis Date    Abnormal mammogram 08/25/2020    Abnormal mammogram 08/25/2020    Acute blood loss anemia     Acute deep vein thrombosis (DVT) of lower extremity 12/09/2020    Advance care planning 04/30/2021    ODESSA (acute kidney injury) 03/21/2020    Anemia due to chronic blood loss     Anemia due to chronic kidney disease     Anemia due to chronic kidney disease 05/18/2020    Anxiety     Bilateral ureteral obstruction 09/11/2020    Bilious vomiting with nausea 06/11/2023    Cardiovascular event risk -- low 09/14/2015    ASCVD 10-year risk 1.9% (with optimal risk factors 1.3%) as of 9/14/2015     Cervical cancer 2014    Chronic back pain     Colostomy care     Deep vein thrombosis     Depression     Diarrhea due to malabsorption 11/14/2018    Difficult intubation     Discharge planning issues 04/30/2021    Disorder of kidney and ureter     DVT of lower extremity, bilateral 11/04/2020    Edema 04/10/2023    Emphysema of lung 04/10/2023    Fibromyalgia     Fungemia 09/27/2020    Generalized abdominal pain 08/25/2020    GERD (gastroesophageal reflux disease)     Hemifacial spasm 09/16/2015    Hiatal hernia 2014    History of cervical cancer 10/11/2018    History of essential hypertension 05/04/2015    Not requiring medications at this time  BP is low- concern that this may correlate with increased stool output from stoma. Encourage her to contact GI and her PCP.    Hx of psychiatric care     Cymbalta, trazodone    Hypertension     Hypomagnesemia 11/21/2018    Hyponatremia 09/10/2023    Impaired functional mobility, balance, gait, and endurance 07/24/2015    Lactose intolerance     Major depressive disorder, recurrent episode, moderate 06/02/2023    Metastatic squamous cell carcinoma to lymph node 10/11/2018    Moderate episode of recurrent major depressive disorder 04/21/2021    Nephrostomy complication 10/26/2023    Neuropathy due to chemotherapeutic drug 11/14/2018    Osteoarthritis of back      Peritonitis 09/22/2020    Physical deconditioning 04/30/2021    Pseudomonas urinary tract infection 04/21/2021    Psychiatric problem     Pyelitis 09/09/2023    QT prolongation 04/16/2021    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 Bx were negative.     Seizure-like activity 12/02/2018    Seizures     Sleep stage dysfunction     Noted on PSG 06/2017; negative for obstructive sleep apnea     Stroke     Urinary tract infection associated with nephrostomy catheter 06/11/2020    Wound infection after surgery 09/24/2020       Past Surgical History:   Procedure Laterality Date    ANASTOMOSIS OF INTESTINE N/A 2/6/2024    Procedure: ANASTOMOSIS, INTESTINE - Ileocolic anastomosis;  Surgeon: Hammad Reynolds MD;  Location: [facility] OR 2ND FLR;  Service: Colon and Rectal;  Laterality: N/A;    ANTEGRADE NEPHROSTOGRAPHY Bilateral 9/28/2020    Procedure: Nephrostogram - antegrade;  Surgeon: Leslie Balbuena MD;  Location: [facility] OR 1ST FLR;  Service: Urology;  Laterality: Bilateral;    ANTEGRADE NEPHROSTOGRAPHY Left 4/20/2021    Procedure: NEPHROSTOGRAM, ANTEGRADE;  Surgeon: Leslie Balbuena MD;  Location: [facility] OR 1ST FLR;  Service: Urology;  Laterality: Left;    ANTEGRADE NEPHROSTOGRAPHY Right 10/27/2022    Procedure: NEPHROSTOGRAM, ANTEGRADE;  Surgeon: Chirag Russ MD;  Location: [facility] OR [facility] FLR;  Service: Urology;  Laterality: Right;    ANTEGRADE NEPHROSTOGRAPHY Right 4/21/2023    Procedure: NEPHROSTOGRAM, ANTEGRADE;  Surgeon: Chirag Russ MD;  Location: [facility] OR 2ND FLR;  Service: Urology;  Laterality: Right;    ANTEGRADE NEPHROSTOGRAPHY Left 6/6/2023    Procedure: Nephrostogram - antegrade;  Surgeon: Leslie Balbuena MD;  Location: [facility] OR 1ST FLR;  Service: Urology;  Laterality: Left;    BILATERAL OOPHORECTOMY  2015    CHOLECYSTECTOMY  11/09/2016    Done at [facility], path showed chronic  cholecystitis and gallstones    cold knife conization  2014    COLECTOMY, RIGHT  9/17/2020    Procedure: COLECTOMY, RIGHT Extended;  Surgeon: Hammad Reynolds MD;  Location: [facility] OR 2ND FLR;  Service: Colon and Rectal;;    COLONOSCOPY  2014    COLONOSCOPY N/A 10/24/2016    at [facility], Dr Gutiérrez    COLONOSCOPY N/A 5/18/2018    Procedure: COLONOSCOPY;  Surgeon: Arden Gutiérrez MD;  Location: [facility] (4TH FLR);  Service: Endoscopy;  Laterality: N/A;    COLONOSCOPY N/A 7/28/2020    Procedure: COLONOSCOPY;  Surgeon: Hammad Reynolds MD;  Location: [facility] (4TH FLR);  Service: Colon and Rectal;  Laterality: N/A;  covid test [place] 7/25    CYSTOSCOPY WITH URETEROSCOPY, RETROGRADE PYELOGRAPHY, AND INSERTION OF STENT Bilateral 3/21/2020    Procedure: CYSTOSCOPY, WITH RETROGRADE PYELOGRAM,;  Surgeon: Leslie Balbuena MD;  Location: [facility] OR 1ST FLR;  Service: Urology;  Laterality: Bilateral;    DILATION OF NEPHROSTOMY TRACT Right 10/27/2022    Procedure: DILATION, NEPHROSTOMY TRACT;  Surgeon: Chirag Russ MD;  Location: [facility] OR 1ST FLR;  Service: Urology;  Laterality: Right;    ESOPHAGOGASTRODUODENOSCOPY  2014    ESOPHAGOGASTRODUODENOSCOPY  11/18/2020    ESOPHAGOGASTRODUODENOSCOPY N/A 11/18/2020    Procedure: ESOPHAGOGASTRODUODENOSCOPY (EGD);  Surgeon: Zenon Spencer MD;  Location: [facility];  Service: Endoscopy;  Laterality: N/A;    ESOPHAGOGASTRODUODENOSCOPY N/A 12/11/2020    Procedure: EGD (ESOPHAGOGASTRODUODENOSCOPY);  Surgeon: Juancho Muse MD;  Location: [facility] ENDO (2ND FLR);  Service: Endoscopy;  Laterality: N/A;    EXCISION, SMALL INTESTINE  2/6/2024    Procedure: EXCISION, SMALL INTESTINE;  Surgeon: Hammad Reynolds MD;  Location: [facility] OR 2ND FLR;  Service: Colon and Rectal;;    HYSTERECTOMY  2014    [facility] for cervical cancer    ILEOSTOMY  9/17/2020    Procedure: CREATION, ILEOSTOMY;  Surgeon: Hammad Reynolds MD;  Location: [facility] OR 2ND FLR;  Service: Colon and Rectal;;    ILEOSTOMY CLOSURE N/A 2/6/2024     Procedure: CLOSURE, ILEOSTOMY;  Surgeon: Hammad Reynolds MD;  [facility] 2ND FLR;  Service: Colon and Rectal;  Laterality: N/A;    LAPAROTOMY, EXPLORATORY N/A 2/6/2024    Procedure: OPEN LAPAROTOMY, EXPLORATORY;  Surgeon: Leslie Balbuena MD;  [facility]: [facility] OR 2ND FLR;  Service: Urology;  Laterality: N/A;  22 modifier    LOOPOGRAM N/A 4/20/2021    Procedure: LOOPOGRAM;  Surgeon: Leslie Balbuena MD;  Location: [facility] OR 1ST FLR;  Service: Urology;  Laterality: N/A;    LOOPOGRAM N/A 6/6/2023    Procedure: LOOPOGRAM;  Surgeon: Leslie Balbuena MD;  Location: [facility] OR 1ST FLR;  Service: Urology;  Laterality: N/A;    LYSIS OF ADHESIONS  2/6/2024    Procedure: LYSIS, ADHESIONS;  Surgeon: Leslie Balbuena MD;  Location: [facility] OR 2ND FLR;  Service: Urology;;    LYSIS OF ADHESIONS  2/6/2024    Procedure: LYSIS, ADHESIONS;  Surgeon: Hammad Reynolds MD;  Location: [facility] OR 2ND FLR;  Service: Colon and Rectal;;    MOBILIZATION OF SPLENIC FLEXURE N/A 9/11/2020    Procedure: MOBILIZATION, COLONIC;  Surgeon: Hammad Reynolds MD;  Location: [facility] OR 2ND FLR;  Service: Colon and Rectal;  Laterality: N/A;    NEPHROSTOGRAPHY Bilateral 4/17/2021    Procedure: Nephrostogram;  Surgeon: Celeste Surgeon;  Location: [facility];  Service: Anesthesiology;  Laterality: Bilateral;    OOPHORECTOMY      PERCUTANEOUS NEPHROLITHOTOMY Right 4/21/2023    Procedure: NEPHROLITHOTOMY, PERCUTANEOUS;  Surgeon: Chirag Russ MD;  Location: [facility] OR 2ND FLR;  Service: Urology;  Laterality: Right;  2.5 hrs    PERCUTANEOUS NEPHROSTOMY  4/21/2023    Procedure: CREATION, NEPHROSTOMY, PERCUTANEOUS with removal of existing nephrostomy tube;  Surgeon: Chirag Russ MD;  Location: [facility] OR 2ND FLR;  Service: Urology;;    PYELOSCOPY Right 10/27/2022    Procedure: PYELOSCOPY;  Surgeon: Chirag Russ MD;  Location: [facility] OR 1ST FLR;  Service: Urology;  Laterality: Right;    REPLACEMENT OF NEPHROSTOMY TUBE Right 10/27/2022    Procedure: REPLACEMENT,  NEPHROSTOMY TUBE;  Surgeon: Chirag Russ MD;  Location: [facility] OR 1ST FLR;  Service: Urology;  Laterality: Right;    RETROPERITONEAL LYMPHADENECTOMY Right 9/17/2018    Procedure: LYMPHADENECTOMY, RETROPERITONEUM;  Surgeon: Sebas Reed MD;  Location: [facility] OR 2ND FLR;  Service: General;  Laterality: Right;  ILIAC    REVISION OF ANASTOMOSIS OF URETER TO ILEAL CONDUIT N/A 2/6/2024    Procedure: REVISION, ANASTOMOSIS, URETEROILEAL;  Surgeon: Leslie Balbuena MD;  Location: [facility] OR 2ND FLR;  Service: Urology;  Laterality: N/A;    URETEROSCOPIC REMOVAL OF URETERIC CALCULUS Right 10/27/2022    Procedure: REMOVAL, CALCULUS, URETER, URETEROSCOPIC;  Surgeon: Chirag Russ MD;  [facility];  Service: Urology;  Laterality: Right;    URETEROSCOPY Right 10/27/2022    Procedure: URETEROSCOPY-ANTEGRADE;  Surgeon: Chirag Russ MD;  Location: [facility] OR [address] [place] [address];  Service: Urology;  Laterality: Right;       Review of patient's allergies indicates:   Allergen Reactions    Bee sting [allergen ext-venom-honey bee]      Rash      Grass pollen-bermuda, standard      rash       No current facility-administered medications on file prior to encounter.     Current Outpatient Medications on File Prior to Encounter   Medication Sig    acetaminophen (TYLENOL) 500 MG tablet Take 1 tablet (500 mg total) by mouth every 6 (six) hours as needed for Pain.    ferrous sulfate (FEOSOL) 325 mg (65 mg iron) Tab tablet TAKE 1 TABLET BY MOUTH TWICE A DAY    loratadine (CLARITIN) 10 mg tablet Take 10 mg by mouth daily as needed for Allergies.    mirtazapine (REMERON) 30 MG tablet Take 1 tablet (30 mg total) by mouth nightly.     Family History       Problem Relation (Age of Onset)    Breast cancer Maternal Aunt    Cancer Father, Mother    Cervical cancer Mother    Colon cancer Father    Drug abuse Daughter, Daughter    Esophageal cancer Father    Heart disease Sister, Maternal Grandmother    Suicide Daughter          Tobacco  Use    Smoking status: Never    Smokeless tobacco: Never   Substance and Sexual Activity    Alcohol use: No     Alcohol/week: 0.0 standard drinks of alcohol    Drug use: No    Sexual activity: Yes     Partners: Male     Birth control/protection: None     Comment:  19 years since 1999     Review of Systems   Constitutional:  Positive for fatigue. Negative for activity change, appetite change, chills and fever.   Respiratory:  Negative for cough, chest tightness, shortness of breath and wheezing.    Cardiovascular:  Negative for chest pain, palpitations and leg swelling.   Gastrointestinal:  Positive for abdominal distention, abdominal pain, nausea and vomiting. Negative for constipation and diarrhea.   Genitourinary:  Positive for difficulty urinating and flank pain.   Neurological:  Positive for dizziness, light-headedness and headaches. Negative for syncope and weakness.     Objective:     Vital Signs (Most Recent):  Temp: 98 °F (36.7 °C) (08/07/24 2315)  Pulse: 74 (08/07/24 2315)  Resp: 16 (08/08/24 0000)  BP: 118/60 (08/07/24 2315)  SpO2: 100 % (08/07/24 2315) Vital Signs (24h Range):  Temp:  [98 °F (36.7 °C)-98.6 °F (37 °C)] 98 °F (36.7 °C)  Pulse:  [70-95] 74  Resp:  [15-18] 16  SpO2:  [96 %-100 %] 100 %  BP: ()/(59-78) 118/60     Weight: 87.1 kg (191 lb 15.6 oz)  Body mass index is 28.35 kg/m².     Physical Exam  Vitals and nursing note reviewed.   Constitutional:       General: She is not in acute distress.     Appearance: Normal appearance. She is well-developed and normal weight. She is not toxic-appearing.   HENT:      Head: Normocephalic and atraumatic.      Mouth/Throat:      Dentition: Normal dentition.   Eyes:      General: Lids are normal.      Extraocular Movements: Extraocular movements intact.      Conjunctiva/sclera: Conjunctivae normal.   Cardiovascular:      Rate and Rhythm: Normal rate and regular rhythm.   Pulmonary:      Effort: Pulmonary effort is normal.      Breath sounds:  Normal breath sounds.   Abdominal:      General: Bowel sounds are normal. There is no distension.      Palpations: Abdomen is soft.      Tenderness: There is abdominal tenderness. There is right CVA tenderness.   Musculoskeletal:      Cervical back: Neck supple.      Right lower leg: No edema.      Left lower leg: No edema.   Skin:     General: Skin is warm and dry.      Findings: No erythema or rash.   Neurological:      Mental Status: She is alert and oriented to person, place, and time.             Significant Labs: All pertinent labs within the past 24 hours have been reviewed.  CBC:   Recent Labs   Lab 08/07/24  1644 08/07/24  1651   WBC 11.66  --    HGB 11.9*  --    HCT 40.7 39     --      CMP:   Recent Labs   Lab 08/07/24  1644      K 4.2   *   CO2 18*      BUN 16   CREATININE 2.0*   CALCIUM 9.8   PROT 8.5*   ALBUMIN 3.3*   BILITOT 0.4   ALKPHOS 122   AST 31   ALT 29   ANIONGAP 11     Coagulation:   Recent Labs   Lab 08/07/24  2132   INR 1.0   APTT 33.6*     Urine Studies:   Recent Labs   Lab 08/07/24  1755   COLORU Yellow   APPEARANCEUA Hazy*   PHUR 8.0   SPECGRAV 1.010   PROTEINUA 1+*   GLUCUA Negative   KETONESU Negative   BILIRUBINUA Negative   OCCULTUA 1+*   NITRITE Positive*   LEUKOCYTESUR 3+*   RBCUA 4   WBCUA 61*   BACTERIA Many*   SQUAMEPITHEL 0   HYALINECASTS 0       Significant Imaging: I have reviewed all pertinent imaging results/findings within the past 24 hours.

## 2024-08-08 NOTE — SUBJECTIVE & OBJECTIVE
"Interval History: No acute events overnight. Hemodynamically stable. Pain is well controlled on current pain regimen. She has been NPO since midnight.     Review of Systems  Objective:     Temp:  [98 °F (36.7 °C)-98.6 °F (37 °C)] 98 °F (36.7 °C)  Pulse:  [60-95] 60  Resp:  [15-18] 18  SpO2:  [95 %-100 %] 95 %  BP: ()/(59-78) 125/61     Body mass index is 28.35 kg/m².           Drains       Drain  Duration                  Urostomy 02/06/24 1815 ureterostomy, left 183 days         Urostomy 02/25/24 1600  days                     Physical Exam  Constitutional:       Appearance: Normal appearance.   HENT:      Head: Normocephalic and atraumatic.   Abdominal:      General: Abdomen is flat.      Palpations: Abdomen is soft.      Comments: LLQ ileostomy, ostomy pink and patent, appears healthy  Clear yellow urine in bag  Abd soft, non tender, non distended  Mild right flank tenderness, no left sided flank tenderness       Skin:     General: Skin is warm.      Capillary Refill: Capillary refill takes less than 2 seconds.   Neurological:      General: No focal deficit present.      Mental Status: She is alert and oriented to person, place, and time.   Psychiatric:         Mood and Affect: Mood normal.           Significant Labs:    BMP:  Recent Labs   Lab 08/07/24  1644 08/08/24  0359    142   K 4.2 4.2   * 115*   CO2 18* 16*   BUN 16 16   CREATININE 2.0* 1.6*   CALCIUM 9.8 9.3       CBC:   Recent Labs   Lab 08/07/24  1644 08/07/24  1651 08/08/24  0359   WBC 11.66  --  12.91*   HGB 11.9*  --  12.1   HCT 40.7 39 40.5     --  254       Blood Culture: No results for input(s): "LABBLOO" in the last 168 hours.  Urine Culture: No results for input(s): "LABURIN" in the last 168 hours.  Urine Studies:   Recent Labs   Lab 08/07/24  1755   COLORU Yellow   APPEARANCEUA Hazy*   PHUR 8.0   SPECGRAV 1.010   PROTEINUA 1+*   GLUCUA Negative   KETONESU Negative   BILIRUBINUA Negative   OCCULTUA 1+*   NITRITE " Positive*   LEUKOCYTESUR 3+*   RBCUA 4   WBCUA 61*   BACTERIA Many*   SQUAMEPITHEL 0   HYALINECASTS 0       Significant Imaging:  All pertinent imaging results/findings from the past 24 hours have been reviewed.

## 2024-08-08 NOTE — H&P
[facility] - Stepdown Flex ([address] Medicine  History & Physical    Patient Name: Edita Riley  MRN: [id number]  Patient Class: IP- Inpatient  Admission Date: 8/7/2024  Attending Physician: Joe Muse MD   Primary Care Provider: Trina Vu MD    Patient information was obtained from patient, past medical records, and ER records.     Subjective:     Principal Problem:ODESSA (acute kidney injury)    Chief Complaint:   Chief Complaint   Patient presents with    Abdominal Pain     Right flank pain radiating to her back with vomiting        HPI: Ms. Riley is a 59 YOF with PMHx of HTN, history of seizure disorder not on AEDs, Schatzki's ring /GERD, history of BLE DVTs, chronic anemia, anxiety/depression, fibromyalgia and chronic back pain, and history of pelvic radiation therapy for cervical cancer which was complicated by bilateral distal ureteral obstruction with bilateral hydronephrosis which was complicated by bowel perforation with colo-colonic anastomotic leak and peritonitis following colonic urinary conduit creation, s/p extended right colectomy with end ileostomy and left ureter-colonic conduit anastomotic stricture of ureteral implant to transverse colon conduit on 2/6/2024 with lysis of adhesions and takedown of ileostomy with current urinary ileostomy and a left percutaneous nephrostomy.    She presents to ED with abrupt onset of right flank pain several hours PTA and with complaints of epigastric abdominal pain, nausea, and vomiting for the last week. She notes generally feeling unwell with malaise/fatigue; also has complaints of intermittent light headiness, dizziness, and headaches. She denies SOB, MUNGUIA, CP, diarrhea, constipation, decreased appetite, or changes in PO intake.    In the ED HDS and afebrile. CBC with WBC 12, H/H 11.9/41, platelets 288. PT/INR 11/1.0, PTT 34. Chemistry with , K 4.2, chloride 112, CO2 18, BUN 16, SCr 2.0, glucose 103. LFTs WNL. UA  1+ protein, 1+ blood, nitrate positive, 3+ leukocytes, WBC 61, many bacteria. Urine culture in process. CT A/P severe right-sided hydronephrosis, secondary to an approximately 6 mm linear calculus just proximal to the anastomosis. Postoperative changes of urinary diversion and left lower quadrant ileostomy. Mild left ureteral wall thickening which can be seen with pyelitis, recommend correlation with urinalysis.  Additional nonobstructing bilateral nephrolithiasis.    Urology consulted in ED with recommendations for IR consult for placement of right nephrostomy tube and no ABXs at current due to low infection concern.      The patient was admitted to the Hospital Medicine Service for further evaluation and management.     Past Medical History:   Diagnosis Date    Abnormal mammogram 08/25/2020    Abnormal mammogram 08/25/2020    Acute blood loss anemia     Acute deep vein thrombosis (DVT) of lower extremity 12/09/2020    Advance care planning 04/30/2021    DOESSA (acute kidney injury) 03/21/2020    Anemia due to chronic blood loss     Anemia due to chronic kidney disease     Anemia due to chronic kidney disease 05/18/2020    Anxiety     Bilateral ureteral obstruction 09/11/2020    Bilious vomiting with nausea 06/11/2023    Cardiovascular event risk -- low 09/14/2015    ASCVD 10-year risk 1.9% (with optimal risk factors 1.3%) as of 9/14/2015     Cervical cancer 2014    Chronic back pain     Colostomy care     Deep vein thrombosis     Depression     Diarrhea due to malabsorption 11/14/2018    Difficult intubation     Discharge planning issues 04/30/2021    Disorder of kidney and ureter     DVT of lower extremity, bilateral 11/04/2020    Edema 04/10/2023    Emphysema of lung 04/10/2023    Fibromyalgia     Fungemia 09/27/2020    Generalized abdominal pain 08/25/2020    GERD (gastroesophageal reflux disease)     Hemifacial spasm 09/16/2015    Hiatal hernia 2014    History of cervical cancer 10/11/2018    History of essential  hypertension 05/04/2015    Not requiring medications at this time  BP is low- concern that this may correlate with increased stool output from stoma. Encourage her to contact GI and her PCP.    Hx of psychiatric care     Cymbalta, trazodone    Hypertension     Hypomagnesemia 11/21/2018    Hyponatremia 09/10/2023    Impaired functional mobility, balance, gait, and endurance 07/24/2015    Lactose intolerance     Major depressive disorder, recurrent episode, moderate 06/02/2023    Metastatic squamous cell carcinoma to lymph node 10/11/2018    Moderate episode of recurrent major depressive disorder 04/21/2021    Nephrostomy complication 10/26/2023    Neuropathy due to chemotherapeutic drug 11/14/2018    Osteoarthritis of back     Peritonitis 09/22/2020    Physical deconditioning 04/30/2021    Pseudomonas urinary tract infection 04/21/2021    Psychiatric problem     Pyelitis 09/09/2023    QT prolongation 04/16/2021    Refusal of blood transfusions as patient is Alevism     Schatzki's ring 09/14/2015    Seen on outside EGD 05/2014, underwent esophageal dilatation. Bx were negative.     Seizure-like activity 12/02/2018    Seizures     Sleep stage dysfunction     Noted on PSG 06/2017; negative for obstructive sleep apnea     Stroke     Urinary tract infection associated with nephrostomy catheter 06/11/2020    Wound infection after surgery 09/24/2020       Past Surgical History:   Procedure Laterality Date    ANASTOMOSIS OF INTESTINE N/A 2/6/2024    Procedure: ANASTOMOSIS, INTESTINE - Ileocolic anastomosis;  Surgeon: Hammad Reynolds MD;  Location: [facility] OR [address] [place] [address];  Service: Colon and Rectal;  Laterality: N/A;    ANTEGRADE NEPHROSTOGRAPHY Bilateral 9/28/2020    Procedure: Nephrostogram - antegrade;  Surgeon: Leslie Balbuena MD;  Location: [facility] OR [address] [place] [address];  Service: Urology;  Laterality: Bilateral;    ANTEGRADE NEPHROSTOGRAPHY Left 4/20/2021    Procedure: NEPHROSTOGRAM, ANTEGRADE;  Surgeon: Leslie Balbuena MD;   Location: [facility] OR 1ST FLR;  Service: Urology;  Laterality: Left;    ANTEGRADE NEPHROSTOGRAPHY Right 10/27/2022    Procedure: NEPHROSTOGRAM, ANTEGRADE;  Surgeon: Chirag Russ MD;  Location: [facility] OR 1ST FLR;  Service: Urology;  Laterality: Right;    ANTEGRADE NEPHROSTOGRAPHY Right 4/21/2023    Procedure: NEPHROSTOGRAM, ANTEGRADE;  Surgeon: Chirag Russ MD;  Location: [facility] OR 2ND FLR;  Service: Urology;  Laterality: Right;    ANTEGRADE NEPHROSTOGRAPHY Left 6/6/2023    Procedure: Nephrostogram - antegrade;  Surgeon: Leslie Balbuena MD;  Location: [facility] OR 1ST FLR;  Service: Urology;  Laterality: Left;    BILATERAL OOPHORECTOMY  2015    CHOLECYSTECTOMY  11/09/2016    Done at [facility], path showed chronic cholecystitis and gallstones    cold knife conization  2014    COLECTOMY, RIGHT  9/17/2020    Procedure: COLECTOMY, RIGHT Extended;  Surgeon: Hammad Reynolds MD;  Location: [facility] OR 2ND FLR;  Service: Colon and Rectal;;    COLONOSCOPY  2014    COLONOSCOPY N/A 10/24/2016    at [facility], Dr Gutiérrez    COLONOSCOPY N/A 5/18/2018    Procedure: COLONOSCOPY;  Surgeon: Arden Gutiérrez MD;  Location: [facility] (4TH FLR);  Service: Endoscopy;  Laterality: N/A;    COLONOSCOPY N/A 7/28/2020    Procedure: COLONOSCOPY;  Surgeon: Hammad Reynolds MD;  Location: [facility] ENDO (4TH FLR);  Service: Colon and Rectal;  Laterality: N/A;  covid test [place] 7/25    CYSTOSCOPY WITH URETEROSCOPY, RETROGRADE PYELOGRAPHY, AND INSERTION OF STENT Bilateral 3/21/2020    Procedure: CYSTOSCOPY, WITH RETROGRADE PYELOGRAM,;  Surgeon: Leslie Balbuena MD;  Location: [facility] OR 1ST FLR;  Service: Urology;  Laterality: Bilateral;    DILATION OF NEPHROSTOMY TRACT Right 10/27/2022    Procedure: DILATION, NEPHROSTOMY TRACT;  Surgeon: Chirag Russ MD;  Location: [facility] OR 1ST FLR;  Service: Urology;  Laterality: Right;    ESOPHAGOGASTRODUODENOSCOPY  2014    ESOPHAGOGASTRODUODENOSCOPY  11/18/2020    ESOPHAGOGASTRODUODENOSCOPY N/A 11/18/2020    Procedure:  ESOPHAGOGASTRODUODENOSCOPY (EGD);  Surgeon: Zenon Spencer MD;  Location: [facility] ENDO;  Service: Endoscopy;  Laterality: N/A;    ESOPHAGOGASTRODUODENOSCOPY N/A 12/11/2020    Procedure: EGD (ESOPHAGOGASTRODUODENOSCOPY);  Surgeon: Juancho Muse MD;  Location: [facility] ENDO (2ND FLR);  Service: Endoscopy;  Laterality: N/A;    EXCISION, SMALL INTESTINE  2/6/2024    Procedure: EXCISION, SMALL INTESTINE;  Surgeon: Hammad Reynolds MD;  Location: [facility] OR 2ND FLR;  Service: Colon and Rectal;;    HYSTERECTOMY  2014    [facility] for cervical cancer    ILEOSTOMY  9/17/2020    Procedure: CREATION, ILEOSTOMY;  Surgeon: Hmamad Reynolds MD;  Location: [facility] OR 2ND FLR;  Service: Colon and Rectal;;    ILEOSTOMY CLOSURE N/A 2/6/2024    Procedure: CLOSURE, ILEOSTOMY;  Surgeon: Hammad Reynolds MD;  Location: [facility] OR 2ND FLR;  Service: Colon and Rectal;  Laterality: N/A;    LAPAROTOMY, EXPLORATORY N/A 2/6/2024    Procedure: OPEN LAPAROTOMY, EXPLORATORY;  Surgeon: Leslie Balbuena MD;  Location: [facility] OR 2ND FLR;  Service: Urology;  Laterality: N/A;  22 modifier    LOOPOGRAM N/A 4/20/2021    Procedure: LOOPOGRAM;  Surgeon: Leslie Balbuena MD;  Location: [facility] OR 1ST FLR;  Service: Urology;  Laterality: N/A;    LOOPOGRAM N/A 6/6/2023    Procedure: LOOPOGRAM;  Surgeon: Leslie Balbuena MD;  Location: [facility] OR 1ST FLR;  Service: Urology;  Laterality: N/A;    LYSIS OF ADHESIONS  2/6/2024    Procedure: LYSIS, ADHESIONS;  Surgeon: Leslie Balbuena MD;  Location: [facility] OR 2ND FLR;  Service: Urology;;    LYSIS OF ADHESIONS  2/6/2024    Procedure: LYSIS, ADHESIONS;  Surgeon: Hammad Reynolds MD;  Location: [facility] OR 2ND FLR;  Service: Colon and Rectal;;    MOBILIZATION OF SPLENIC FLEXURE N/A 9/11/2020    Procedure: MOBILIZATION, COLONIC;  Surgeon: Hammad Reynolds MD;  Location: [facility] OR 2ND FLR;  Service: Colon and Rectal;  Laterality: N/A;    NEPHROSTOGRAPHY Bilateral 4/17/2021    Procedure: Nephrostogram;  Surgeon: Celeste Surgeon;  Location: [facility]  [facility];  Service: Anesthesiology;  Laterality: Bilateral;    OOPHORECTOMY      PERCUTANEOUS NEPHROLITHOTOMY Right 4/21/2023    Procedure: NEPHROLITHOTOMY, PERCUTANEOUS;  Surgeon: Chirag Russ MD;  [facility];  Service: Urology;  Laterality: Right;  2.5 hrs    PERCUTANEOUS NEPHROSTOMY  4/21/2023    Procedure: CREATION, NEPHROSTOMY, PERCUTANEOUS with removal of existing nephrostomy tube;  Surgeon: Chirag Russ MD;  Location: [facility] OR [address] [place] [address];  Service: Urology;;    PYELOSCOPY Right 10/27/2022    Procedure: PYELOSCOPY;  Surgeon: Chirag Russ MD;  Location: [facility] OR [address] [place] [address];  Service: Urology;  Laterality: Right;    REPLACEMENT OF NEPHROSTOMY TUBE Right 10/27/2022    Procedure: REPLACEMENT, NEPHROSTOMY TUBE;  Surgeon: Chirag Russ MD;  Location: [facility] OR [address] [place] [address];  Service: Urology;  Laterality: Right;    RETROPERITONEAL LYMPHADENECTOMY Right 9/17/2018    Procedure: LYMPHADENECTOMY, RETROPERITONEUM;  Surgeon: Sebas Reed MD;  Location: [facility] OR [address] [place] [address];  Service: General;  Laterality: Right;  ILIAC    REVISION OF ANASTOMOSIS OF URETER TO ILEAL CONDUIT N/A 2/6/2024    Procedure: REVISION, ANASTOMOSIS, URETEROILEAL;  Surgeon: Leslie Balbuena MD;  Location: [facility] OR [address] [place] [address];  Service: Urology;  Laterality: N/A;    URETEROSCOPIC REMOVAL OF URETERIC CALCULUS Right 10/27/2022    Procedure: REMOVAL, CALCULUS, URETER, URETEROSCOPIC;  Surgeon: Chirag Russ MD;  Location: [facility] OR [address] [place] [address];  Service: Urology;  Laterality: Right;    URETEROSCOPY Right 10/27/2022    Procedure: URETEROSCOPY-ANTEGRADE;  Surgeon: Chirag Russ MD;  Location: [facility] OR [address] [place] [address];  Service: Urology;  Laterality: Right;       Review of patient's allergies indicates:   Allergen Reactions    Bee sting [allergen ext-venom-honey bee]      Rash      Grass pollen-bermuda, standard      rash       No current facility-administered medications on file prior to encounter.     Current Outpatient Medications on File  Prior to Encounter   Medication Sig    acetaminophen (TYLENOL) 500 MG tablet Take 1 tablet (500 mg total) by mouth every 6 (six) hours as needed for Pain.    ferrous sulfate (FEOSOL) 325 mg (65 mg iron) Tab tablet TAKE 1 TABLET BY MOUTH TWICE A DAY    loratadine (CLARITIN) 10 mg tablet Take 10 mg by mouth daily as needed for Allergies.    mirtazapine (REMERON) 30 MG tablet Take 1 tablet (30 mg total) by mouth nightly.     Family History       Problem Relation (Age of Onset)    Breast cancer Maternal Aunt    Cancer Father, Mother    Cervical cancer Mother    Colon cancer Father    Drug abuse Daughter, Daughter    Esophageal cancer Father    Heart disease Sister, Maternal Grandmother    Suicide Daughter          Tobacco Use    Smoking status: Never    Smokeless tobacco: Never   Substance and Sexual Activity    Alcohol use: No     Alcohol/week: 0.0 standard drinks of alcohol    Drug use: No    Sexual activity: Yes     Partners: Male     Birth control/protection: None     Comment:  19 years since 1999     Review of Systems   Constitutional:  Positive for fatigue. Negative for activity change, appetite change, chills and fever.   Respiratory:  Negative for cough, chest tightness, shortness of breath and wheezing.    Cardiovascular:  Negative for chest pain, palpitations and leg swelling.   Gastrointestinal:  Positive for abdominal distention, abdominal pain, nausea and vomiting. Negative for constipation and diarrhea.   Genitourinary:  Positive for difficulty urinating and flank pain.   Neurological:  Positive for dizziness, light-headedness and headaches. Negative for syncope and weakness.     Objective:     Vital Signs (Most Recent):  Temp: 98 °F (36.7 °C) (08/07/24 2315)  Pulse: 74 (08/07/24 2315)  Resp: 16 (08/08/24 0000)  BP: 118/60 (08/07/24 2315)  SpO2: 100 % (08/07/24 2315) Vital Signs (24h Range):  Temp:  [98 °F (36.7 °C)-98.6 °F (37 °C)] 98 °F (36.7 °C)  Pulse:  [70-95] 74  Resp:  [15-18] 16  SpO2:  [96  %-100 %] 100 %  BP: ()/(59-78) 118/60     Weight: 87.1 kg (191 lb 15.6 oz)  Body mass index is 28.35 kg/m².     Physical Exam  Vitals and nursing note reviewed.   Constitutional:       General: She is not in acute distress.     Appearance: Normal appearance. She is well-developed and normal weight. She is not toxic-appearing.   HENT:      Head: Normocephalic and atraumatic.      Mouth/Throat:      Dentition: Normal dentition.   Eyes:      General: Lids are normal.      Extraocular Movements: Extraocular movements intact.      Conjunctiva/sclera: Conjunctivae normal.   Cardiovascular:      Rate and Rhythm: Normal rate and regular rhythm.   Pulmonary:      Effort: Pulmonary effort is normal.      Breath sounds: Normal breath sounds.   Abdominal:      General: Bowel sounds are normal. There is no distension.      Palpations: Abdomen is soft.      Tenderness: There is abdominal tenderness. There is right CVA tenderness.   Musculoskeletal:      Cervical back: Neck supple.      Right lower leg: No edema.      Left lower leg: No edema.   Skin:     General: Skin is warm and dry.      Findings: No erythema or rash.   Neurological:      Mental Status: She is alert and oriented to person, place, and time.             Significant Labs: All pertinent labs within the past 24 hours have been reviewed.  CBC:   Recent Labs   Lab 08/07/24  1644 08/07/24  1651   WBC 11.66  --    HGB 11.9*  --    HCT 40.7 39     --      CMP:   Recent Labs   Lab 08/07/24  1644      K 4.2   *   CO2 18*      BUN 16   CREATININE 2.0*   CALCIUM 9.8   PROT 8.5*   ALBUMIN 3.3*   BILITOT 0.4   ALKPHOS 122   AST 31   ALT 29   ANIONGAP 11     Coagulation:   Recent Labs   Lab 08/07/24  2132   INR 1.0   APTT 33.6*     Urine Studies:   Recent Labs   Lab 08/07/24  1755   COLORU Yellow   APPEARANCEUA Hazy*   PHUR 8.0   SPECGRAV 1.010   PROTEINUA 1+*   GLUCUA Negative   KETONESU Negative   BILIRUBINUA Negative   OCCULTUA 1+*   NITRITE  Positive*   LEUKOCYTESUR 3+*   RBCUA 4   WBCUA 61*   BACTERIA Many*   SQUAMEPITHEL 0   HYALINECASTS 0       Significant Imaging: I have reviewed all pertinent imaging results/findings within the past 24 hours.  Assessment/Plan:     ODESSA (acute kidney injury)  R hydronephrosis  Nephrostomy status  ODESSA on CKD  CKD, stage III  -admitted due to R hydronephrosis with complicated urological and colorectal history detailed in HPI  -at admission HDS and afebrile  -admission workup: CBC with WBC 12, H/H 11.9/41, platelets 288. PT/INR 11/1.0, PTT 34. Chemistry with , K 4.2, chloride 112, CO2 18, BUN 16, SCr 2.0, glucose 103. LFTs WNL. UA 1+ protein, 1+ blood, nitrate positive, 3+ leukocytes, WBC 61, many bacteria. Urine culture in process. CT A/P severe right-sided hydronephrosis, secondary to an approximately 6 mm linear calculus just proximal to the anastomosis. Postoperative changes of urinary diversion and left lower quadrant ileostomy. Mild left ureteral wall thickening which can be seen with pyelitis, recommend correlation with urinalysis.  Additional nonobstructing bilateral nephrolithiasis.  -SCr at admission 2.0 >>> baseline SCr 1.2-1.5  -Urology consulted in ED with recommendations for IR consult for placement of right nephrostomy tube and no ABXs at current due to low infection concern  -IR consulted  -NPO at [place]  -begin ABXs if become clinically indicated  -strict I&Os  -PRN supportive care as indicated  -trend labs, address/replete electrolytes as indicated    Anemia in chronic kidney disease  -chronic  -H/H stable at admission  -continue home iron supplement  -trend CBC, transfuse as indicated  -monitor     Essential hypertension  -chronic  -controlled  -no home antihypertensives  -address if indicated  -monitor     Gastro-esophageal reflux disease with esophagitis  -chronic  -no home therapies   -PRN supportive care as indicated      Fibromyalgia  -chronic  -PRN supportive care as indicated  -fall  precautions      VTE Risk Mitigation (From admission, onward)           Ordered     enoxaparin injection 30 mg  Daily         08/07/24 2305     IP VTE HIGH RISK PATIENT  Once         08/07/24 2305     Place sequential compression device  Until discontinued         08/07/24 2305                    Ladi Vega DNP, AG-ACNP, BC  Department of Hospital Medicine  [facility]

## 2024-08-08 NOTE — HPI
Ms. Riley is a 59 YOF with PMHx of HTN, history of seizure disorder not on AEDs, Schatzki's ring /GERD, history of BLE DVTs, chronic anemia, anxiety/depression, fibromyalgia and chronic back pain, and history of pelvic radiation therapy for cervical cancer which was complicated by bilateral distal ureteral obstruction with bilateral hydronephrosis which was complicated by bowel perforation with colo-colonic anastomotic leak and peritonitis following colonic urinary conduit creation, s/p extended right colectomy with end ileostomy and left ureter-colonic conduit anastomotic stricture of ureteral implant to transverse colon conduit on 2/6/2024 with lysis of adhesions and takedown of ileostomy with current urinary ileostomy and a left percutaneous nephrostomy.    She presents to ED with abrupt onset of right flank pain several hours PTA and with complaints of epigastric abdominal pain, nausea, and vomiting for the last week. She notes generally feeling unwell with malaise/fatigue; also has complaints of intermittent light headiness, dizziness, and headaches. She denies SOB, MUNGUIA, CP, diarrhea, constipation, decreased appetite, or changes in PO intake.    In the ED HDS and afebrile. CBC with WBC 12, H/H 11.9/41, platelets 288. PT/INR 11/1.0, PTT 34. Chemistry with , K 4.2, chloride 112, CO2 18, BUN 16, SCr 2.0, glucose 103. LFTs WNL. UA 1+ protein, 1+ blood, nitrate positive, 3+ leukocytes, WBC 61, many bacteria. Urine culture in process. CT A/P severe right-sided hydronephrosis, secondary to an approximately 6 mm linear calculus just proximal to the anastomosis. Postoperative changes of urinary diversion and left lower quadrant ileostomy. Mild left ureteral wall thickening which can be seen with pyelitis, recommend correlation with urinalysis.  Additional nonobstructing bilateral nephrolithiasis.    Urology consulted in ED with recommendations for IR consult for placement of right nephrostomy tube and no  ABXs at current due to low infection concern.      The patient was admitted to the Hospital Medicine Service for further evaluation and management.

## 2024-08-09 LAB
ANION GAP SERPL CALC-SCNC: 8 MMOL/L (ref 8–16)
BASOPHILS # BLD AUTO: 0.02 K/UL (ref 0–0.2)
BASOPHILS NFR BLD: 0.2 % (ref 0–1.9)
BUN SERPL-MCNC: 15 MG/DL (ref 8–23)
CALCIUM SERPL-MCNC: 9.4 MG/DL (ref 8.7–10.5)
CHLORIDE SERPL-SCNC: 115 MMOL/L (ref 95–110)
CO2 SERPL-SCNC: 20 MMOL/L (ref 23–29)
CREAT SERPL-MCNC: 1.5 MG/DL (ref 0.5–1.4)
DIFFERENTIAL METHOD BLD: ABNORMAL
EOSINOPHIL # BLD AUTO: 0.1 K/UL (ref 0–0.5)
EOSINOPHIL NFR BLD: 0.8 % (ref 0–8)
ERYTHROCYTE [DISTWIDTH] IN BLOOD BY AUTOMATED COUNT: 15.3 % (ref 11.5–14.5)
EST. GFR  (NO RACE VARIABLE): 39.4 ML/MIN/1.73 M^2
GLUCOSE SERPL-MCNC: 92 MG/DL (ref 70–110)
HCT VFR BLD AUTO: 40 % (ref 37–48.5)
HGB BLD-MCNC: 11.7 G/DL (ref 12–16)
IMM GRANULOCYTES # BLD AUTO: 0.04 K/UL (ref 0–0.04)
IMM GRANULOCYTES NFR BLD AUTO: 0.4 % (ref 0–0.5)
LYMPHOCYTES # BLD AUTO: 1 K/UL (ref 1–4.8)
LYMPHOCYTES NFR BLD: 9.2 % (ref 18–48)
MCH RBC QN AUTO: 26.7 PG (ref 27–31)
MCHC RBC AUTO-ENTMCNC: 29.3 G/DL (ref 32–36)
MCV RBC AUTO: 91 FL (ref 82–98)
MONOCYTES # BLD AUTO: 1.1 K/UL (ref 0.3–1)
MONOCYTES NFR BLD: 9.3 % (ref 4–15)
NEUTROPHILS # BLD AUTO: 9 K/UL (ref 1.8–7.7)
NEUTROPHILS NFR BLD: 80.1 % (ref 38–73)
NRBC BLD-RTO: 0 /100 WBC
PLATELET # BLD AUTO: 227 K/UL (ref 150–450)
PMV BLD AUTO: 10.8 FL (ref 9.2–12.9)
POTASSIUM SERPL-SCNC: 4.1 MMOL/L (ref 3.5–5.1)
RBC # BLD AUTO: 4.38 M/UL (ref 4–5.4)
SODIUM SERPL-SCNC: 143 MMOL/L (ref 136–145)
WBC # BLD AUTO: 11.24 K/UL (ref 3.9–12.7)

## 2024-08-09 PROCEDURE — 36415 COLL VENOUS BLD VENIPUNCTURE: CPT

## 2024-08-09 PROCEDURE — 20600001 HC STEP DOWN PRIVATE ROOM

## 2024-08-09 PROCEDURE — 25000003 PHARM REV CODE 250: Performed by: STUDENT IN AN ORGANIZED HEALTH CARE EDUCATION/TRAINING PROGRAM

## 2024-08-09 PROCEDURE — 25000003 PHARM REV CODE 250: Performed by: NURSE PRACTITIONER

## 2024-08-09 PROCEDURE — 63600175 PHARM REV CODE 636 W HCPCS: Performed by: STUDENT IN AN ORGANIZED HEALTH CARE EDUCATION/TRAINING PROGRAM

## 2024-08-09 PROCEDURE — 85025 COMPLETE CBC W/AUTO DIFF WBC: CPT

## 2024-08-09 PROCEDURE — 80048 BASIC METABOLIC PNL TOTAL CA: CPT

## 2024-08-09 PROCEDURE — 63600175 PHARM REV CODE 636 W HCPCS: Performed by: NURSE PRACTITIONER

## 2024-08-09 RX ORDER — LEVOFLOXACIN 500 MG/1
500 TABLET, FILM COATED ORAL DAILY
Status: DISCONTINUED | OUTPATIENT
Start: 2024-08-09 | End: 2024-08-09

## 2024-08-09 RX ORDER — PROMETHAZINE HYDROCHLORIDE 12.5 MG/1
12.5 TABLET ORAL EVERY 6 HOURS PRN
Status: DISCONTINUED | OUTPATIENT
Start: 2024-08-09 | End: 2024-08-09

## 2024-08-09 RX ADMIN — ONDANSETRON 4 MG: 2 INJECTION INTRAMUSCULAR; INTRAVENOUS at 09:08

## 2024-08-09 RX ADMIN — TRAMADOL HYDROCHLORIDE 50 MG: 50 TABLET, COATED ORAL at 08:08

## 2024-08-09 RX ADMIN — CEFEPIME 1 G: 1 INJECTION, POWDER, FOR SOLUTION INTRAMUSCULAR; INTRAVENOUS at 06:08

## 2024-08-09 RX ADMIN — ONDANSETRON 8 MG: 8 TABLET, ORALLY DISINTEGRATING ORAL at 08:08

## 2024-08-09 RX ADMIN — MIRTAZAPINE 30 MG: 15 TABLET, FILM COATED ORAL at 08:08

## 2024-08-09 RX ADMIN — HEPARIN SODIUM 5000 UNITS: 5000 INJECTION INTRAVENOUS; SUBCUTANEOUS at 02:08

## 2024-08-09 RX ADMIN — ONDANSETRON 8 MG: 8 TABLET, ORALLY DISINTEGRATING ORAL at 02:08

## 2024-08-09 RX ADMIN — SENNOSIDES AND DOCUSATE SODIUM 1 TABLET: 50; 8.6 TABLET ORAL at 08:08

## 2024-08-09 RX ADMIN — HYDROMORPHONE HYDROCHLORIDE 1 MG: 1 INJECTION, SOLUTION INTRAMUSCULAR; INTRAVENOUS; SUBCUTANEOUS at 03:08

## 2024-08-09 RX ADMIN — FERROUS SULFATE TAB EC 325 MG (65 MG FE EQUIVALENT) 1 EACH: 325 (65 FE) TABLET DELAYED RESPONSE at 08:08

## 2024-08-09 RX ADMIN — HEPARIN SODIUM 5000 UNITS: 5000 INJECTION INTRAVENOUS; SUBCUTANEOUS at 06:08

## 2024-08-09 RX ADMIN — HEPARIN SODIUM 5000 UNITS: 5000 INJECTION INTRAVENOUS; SUBCUTANEOUS at 10:08

## 2024-08-09 RX ADMIN — PROMETHAZINE HYDROCHLORIDE 6.25 MG: 25 INJECTION INTRAMUSCULAR; INTRAVENOUS at 11:08

## 2024-08-09 RX ADMIN — HYDROMORPHONE HYDROCHLORIDE 1 MG: 1 INJECTION, SOLUTION INTRAMUSCULAR; INTRAVENOUS; SUBCUTANEOUS at 09:08

## 2024-08-09 NOTE — PLAN OF CARE
Discharge Plan A and Plan B have been determined by review of patient's clinical status, future medical and therapeutic needs, and coverage/benefits for post-acute care in coordination with multidisciplinary team members.    08/09/24 1343   Discharge Reassessment   Assessment Type Discharge Planning Reassessment   Did the patient's condition or plan change since previous assessment? No   Discharge Plan discussed with: Patient   Communicated OK with patient/caregiver Yes   Discharge Plan A Home with family   Discharge Plan B Home with family   DME Needed Upon Discharge  none   Transition of Care Barriers None   Why the patient remains in the hospital Requires continued medical care   Post-Acute Status   Post-Acute Authorization Other   Coverage : MEDICAID - [facility] -   Other Status No Post-Acute Service Needs   Discharge Delays None known at this time     CM met with patient  to discuss discharge planning.  Patients plan is to dc home with spouse. Spouse will provide transportation.     OK: 8/10/24            Kiki Peterson RN  Case Management  [facility]  [phone]

## 2024-08-09 NOTE — NURSING
[id number] VENECIA Riley  :1965     AGE:59 y.o.     SEX:female      STATUS:Full Code      ISOLATION:No active isolations   ALLERGIES:Bee sting [allergen ext-venom-honey bee] and Grass pollen-bermuda, standard   ADMIT DATE:  2024   PHYSICIAN:  Brittney Bolton MD   DIAGNOSIS: Acute kidney injury [N17.9]  Hydronephrosis, unspecified hydronephrosis type [N13.30] ODESSA (acute kidney injury)  CC: Abdominal Pain (Right flank pain radiating to her back with vomiting)  Nursing Update/Plan of Care: [id number] VENECIA Riley  :1965     AGE:59 y.o.     SEX:female      STATUS:Full Code      ISOLATION:No active isolations   ALLERGIES:Bee sting [allergen ext-venom-honey bee] and Grass pollen-bermuda, standard   ADMIT DATE:  2024   PHYSICIAN:  Brittney Bolton MD   DIAGNOSIS: Acute kidney injury [N17.9]  Hydronephrosis, unspecified hydronephrosis type [N13.30] ODESSA (acute kidney injury)  CC: Abdominal Pain (Right flank pain radiating to her back with vomiting)    CONSULTS:   Past Medical History:   Diagnosis Date    Abnormal mammogram 2020    Abnormal mammogram 2020    Acute blood loss anemia     Acute deep vein thrombosis (DVT) of lower extremity 2020    Advance care planning 2021    ODESSA (acute kidney injury) 2020    Anemia due to chronic blood loss     Anemia due to chronic kidney disease     Anemia due to chronic kidney disease 2020    Anxiety     Bilateral ureteral obstruction 2020    Bilious vomiting with nausea 2023    Cardiovascular event risk -- low 2015    ASCVD 10-year risk 1.9% (with optimal risk factors 1.3%) as of 2015     Cervical cancer 2014    Chronic back pain     Colostomy care     Deep vein thrombosis     Depression     Diarrhea due to malabsorption 2018    Difficult intubation     Discharge planning issues 2021    Disorder of kidney and ureter     DVT of lower extremity, bilateral 2020     Edema 04/10/2023    Emphysema of lung 04/10/2023    Fibromyalgia     Fungemia 09/27/2020    Generalized abdominal pain 08/25/2020    GERD (gastroesophageal reflux disease)     Hemifacial spasm 09/16/2015    Hiatal hernia 2014    History of cervical cancer 10/11/2018    History of essential hypertension 05/04/2015    Not requiring medications at this time  BP is low- concern that this may correlate with increased stool output from stoma. Encourage her to contact GI and her PCP.    Hx of psychiatric care     Cymbalta, trazodone    Hypertension     Hypomagnesemia 11/21/2018    Hyponatremia 09/10/2023    Impaired functional mobility, balance, gait, and endurance 07/24/2015    Lactose intolerance     Major depressive disorder, recurrent episode, moderate 06/02/2023    Metastatic squamous cell carcinoma to lymph node 10/11/2018    Moderate episode of recurrent major depressive disorder 04/21/2021    Nephrostomy complication 10/26/2023    Neuropathy due to chemotherapeutic drug 11/14/2018    Osteoarthritis of back     Peritonitis 09/22/2020    Physical deconditioning 04/30/2021    Pseudomonas urinary tract infection 04/21/2021    Psychiatric problem     Pyelitis 09/09/2023    QT prolongation 04/16/2021    Refusal of blood transfusions as patient is Gnosticism     Schatzki's ring 09/14/2015    Seen on outside EGD 05/2014, underwent esophageal dilatation. Bx were negative.     Seizure-like activity 12/02/2018    Seizures     Sleep stage dysfunction     Noted on PSG 06/2017; negative for obstructive sleep apnea     Stroke     Urinary tract infection associated with nephrostomy catheter 06/11/2020    Wound infection after surgery 09/24/2020     Scheduled procedure(s): No  Labs to Monitor (no values):   Recent Vitals:  Temp: 97.5 °F (36.4 °C)  Pulse: 70  Resp: 18  SpO2: 100 %  BP: 127/72    Respiratory:    Cardiac: Rhythm: normal sinus rhythm      Wt Readings from Last 2 Encounters:   08/07/24 87.1 kg (191 lb 15.6 oz)    03/15/24 83.3 kg (183 lb 12.1 oz)     GI/: Diet Adult Regular Standard Tray   Last Bowel Movement: 08/07/24    Neuro: WDL  Little catheter: No  Skin:ex:surgical  site     Ascencion Score: 21      Lines/Drains/Airways       Drain  Duration                  Urostomy 02/06/24 1815 ureterostomy, left 184 days         Urostomy 02/25/24 1600  days         Nephrostomy 08/08/24 1547 Right 10 Fr. <1 day              Peripheral Intravenous Line  Duration                  Peripheral IV - Single Lumen 08/07/24 1644 22 G Left Antecubital 1 day                  Antibiotics (From admission, onward)      None          VTE Risk Mitigation (From admission, onward)           Ordered     heparin (porcine) injection 5,000 Units  Every 8 hours         08/08/24 1335     IP VTE HIGH RISK PATIENT  Once         08/07/24 2305     Place sequential compression device  Until discontinued         08/07/24 2305                  Fall risk: standard interventions  Mobility: GEMS ([place] Early Mobility Scale): Level 4-Independent activities    Nursing Update/Plan of Care: Patient is alert and oriented x4,  had one episode of emesis  zofran 8mg  PO given.  Pain medication given intervention effective patient express relief. Nephrostomy site intact, clean, dry and no sign of hematoma, no redness or drainage present. Care ongoing and safety maintain at all time

## 2024-08-09 NOTE — PROGRESS NOTES
[facility] - [facility]down [facility] ([address] Medicine  Progress Note    Patient Name: Edita Riley  MRN: [id number]  Patient Class: IP- Inpatient   Admission Date: 8/7/2024  Length of Stay: 2 days  Attending Physician: Brittney Bolton MD  Primary Care Provider: Trina Vu MD        Subjective:     Principal Problem:ODESSA (acute kidney injury)        HPI:  Ms. Riley is a 59 YOF with PMHx of HTN, history of seizure disorder not on AEDs, Schatzki's ring /GERD, history of BLE DVTs, chronic anemia, anxiety/depression, fibromyalgia and chronic back pain, and history of pelvic radiation therapy for cervical cancer which was complicated by bilateral distal ureteral obstruction with bilateral hydronephrosis which was complicated by bowel perforation with colo-colonic anastomotic leak and peritonitis following colonic urinary conduit creation, s/p extended right colectomy with end ileostomy and left ureter-colonic conduit anastomotic stricture of ureteral implant to transverse colon conduit on 2/6/2024 with lysis of adhesions and takedown of ileostomy with current urinary ileostomy and a left percutaneous nephrostomy.    She presents to ED with abrupt onset of right flank pain several hours PTA and with complaints of epigastric abdominal pain, nausea, and vomiting for the last week. She notes generally feeling unwell with malaise/fatigue; also has complaints of intermittent light headiness, dizziness, and headaches. She denies SOB, MUNGUIA, CP, diarrhea, constipation, decreased appetite, or changes in PO intake.    In the ED HDS and afebrile. CBC with WBC 12, H/H 11.9/41, platelets 288. PT/INR 11/1.0, PTT 34. Chemistry with , K 4.2, chloride 112, CO2 18, BUN 16, SCr 2.0, glucose 103. LFTs WNL. UA 1+ protein, 1+ blood, nitrate positive, 3+ leukocytes, WBC 61, many bacteria. Urine culture in process. CT A/P severe right-sided hydronephrosis, secondary to an approximately 6 mm linear calculus just  proximal to the anastomosis. Postoperative changes of urinary diversion and left lower quadrant ileostomy. Mild left ureteral wall thickening which can be seen with pyelitis, recommend correlation with urinalysis.  Additional nonobstructing bilateral nephrolithiasis.    Urology consulted in ED with recommendations for IR consult for placement of right nephrostomy tube and no ABXs at current due to low infection concern.      The patient was admitted to the Hospital Medicine Service for further evaluation and management.     Overview/Hospital Course:  No notes on file    Interval History:      IR consult for placement of right nephrostomy tube     Review of Systems   Constitutional: Negative.    HENT: Negative.     Gastrointestinal:  Positive for abdominal pain and nausea.     Objective:     Vital Signs (Most Recent):  Temp: 98.3 °F (36.8 °C) (08/09/24 0448)  Pulse: 75 (08/09/24 0448)  Resp: 18 (08/09/24 0448)  BP: (!) 111/53 (08/09/24 0448)  SpO2: 100 % (08/09/24 0448) Vital Signs (24h Range):  Temp:  [97.5 °F (36.4 °C)-99.3 °F (37.4 °C)] 98.3 °F (36.8 °C)  Pulse:  [68-85] 75  Resp:  [10-18] 18  SpO2:  [95 %-100 %] 100 %  BP: ()/(44-72) 111/53     Weight: 87 kg (191 lb 12.8 oz)  Body mass index is 28.32 kg/m².    Intake/Output Summary (Last 24 hours) at 8/9/2024 0702  Last data filed at 8/9/2024 0546  Gross per 24 hour   Intake 360 ml   Output 2675 ml   Net -2315 ml         Physical Exam  Constitutional:       General: She is not in acute distress.     Appearance: She is not ill-appearing.   Pulmonary:      Effort: Pulmonary effort is normal.      Breath sounds: Normal breath sounds.   Abdominal:      Palpations: Abdomen is soft.      Tenderness: There is abdominal tenderness.   Musculoskeletal:      Right lower leg: No edema.      Left lower leg: No edema.   Neurological:      Mental Status: She is alert.             Significant Labs: All pertinent labs within the past 24 hours have been  reviewed.    Significant Imaging: I have reviewed all pertinent imaging results/findings within the past 24 hours.    Assessment/Plan:      * ODESSA (acute kidney injury)  R hydronephrosis  Nephrostomy status  ODESSA on CKD  CKD, stage III  -admitted due to R hydronephrosis with complicated urological and colorectal history detailed in HPI  -at admission HDS and afebrile  -admission workup: CBC with WBC 12, H/H 11.9/41, platelets 288. PT/INR 11/1.0, PTT 34. Chemistry with , K 4.2, chloride 112, CO2 18, BUN 16, SCr 2.0, glucose 103. LFTs WNL. UA 1+ protein, 1+ blood, nitrate positive, 3+ leukocytes, WBC 61, many bacteria. Urine culture in process. CT A/P severe right-sided hydronephrosis, secondary to an approximately 6 mm linear calculus just proximal to the anastomosis. Postoperative changes of urinary diversion and left lower quadrant ileostomy. Mild left ureteral wall thickening which can be seen with pyelitis, recommend correlation with urinalysis.  Additional nonobstructing bilateral nephrolithiasis.  -SCr at admission 2.0 >>> baseline SCr 1.2-1.5  -Urology consulted in ED with recommendations for IR consult for placement of right nephrostomy tube and no ABXs at current due to low infection concern  -IR consulted  -NPO at MN  -begin ABXs if become clinically indicated  -strict I&Os  -PRN supportive care as indicated  -trend labs, address/replete electrolytes as indicated    Gastro-esophageal reflux disease with esophagitis  -chronic  -no home therapies   -PRN supportive care as indicated      Anemia in chronic kidney disease  -chronic  -H/H stable at admission  -continue home iron supplement  -trend CBC, transfuse as indicated  -monitor     Essential hypertension  -chronic  -controlled  -no home antihypertensives  -address if indicated  -monitor     Fibromyalgia  -chronic  -PRN supportive care as indicated  -fall precautions      VTE Risk Mitigation (From admission, onward)           Ordered     heparin  (porcine) injection 5,000 Units  Every 8 hours         08/08/24 1335     IP VTE HIGH RISK PATIENT  Once         08/07/24 2305     Place sequential compression device  Until discontinued         08/07/24 2305                    Discharge Planning   OK: 8/10/2024     Code Status: Full Code   Is the patient medically ready for discharge?:     Reason for patient still in hospital (select all that apply): Patient trending condition  Discharge Plan A: Home with family                  Brittney Bolton MD  Department of Hospital Medicine   [facility] - Stepdown Flex ([place])

## 2024-08-09 NOTE — SUBJECTIVE & OBJECTIVE
Interval History:     Patient had episode of vomiting this morning.   Complains of abdominal pain and right side flank pain. Says it feels worse than yesterday.    Review of Systems   Constitutional:  Positive for appetite change and fatigue. Negative for chills and fever.   Respiratory: Negative.     Cardiovascular: Negative.    Gastrointestinal:  Positive for abdominal pain, nausea and vomiting.   Genitourinary:  Positive for flank pain.     Objective:     Vital Signs (Most Recent):  Temp: 98.4 °F (36.9 °C) (08/09/24 1156)  Pulse: 86 (08/09/24 1156)  Resp: 18 (08/09/24 1156)  BP: 120/71 (08/09/24 1156)  SpO2: 98 % (08/09/24 1156) Vital Signs (24h Range):  Temp:  [97.5 °F (36.4 °C)-99.4 °F (37.4 °C)] 98.4 °F (36.9 °C)  Pulse:  [68-86] 86  Resp:  [10-18] 18  SpO2:  [95 %-100 %] 98 %  BP: ()/(44-72) 120/71     Weight: 87 kg (191 lb 12.8 oz)  Body mass index is 28.32 kg/m².    Intake/Output Summary (Last 24 hours) at 8/9/2024 1453  Last data filed at 8/9/2024 1200  Gross per 24 hour   Intake 360 ml   Output 1900 ml   Net -1540 ml         Physical Exam  Constitutional:       General: She is not in acute distress.  Cardiovascular:      Pulses: Normal pulses.      Heart sounds: Normal heart sounds. No murmur heard.  Pulmonary:      Effort: Pulmonary effort is normal.      Breath sounds: Normal breath sounds.   Abdominal:      General: Abdomen is flat. There is no distension.      Palpations: Abdomen is soft.      Tenderness: There is abdominal tenderness. There is no guarding.   Musculoskeletal:      Right lower leg: No edema.      Left lower leg: No edema.   Neurological:      Mental Status: She is alert.             Significant Labs: All pertinent labs within the past 24 hours have been reviewed.  BMP:   Recent Labs   Lab 08/08/24  0359 08/09/24  0423   GLU 85 92    143   K 4.2 4.1   * 115*   CO2 16* 20*   BUN 16 15   CREATININE 1.6* 1.5*   CALCIUM 9.3 9.4   MG 1.8  --      CBC:   Recent Labs   Lab  08/07/24  1644 08/07/24  1651 08/08/24  0359 08/09/24  0423   WBC 11.66  --  12.91* 11.24   HGB 11.9*  --  12.1 11.7*   HCT 40.7 39 40.5 40.0     --  254 227     CMP:   Recent Labs   Lab 08/07/24  1644 08/08/24  0359 08/09/24  0423    142 143   K 4.2 4.2 4.1   * 115* 115*   CO2 18* 16* 20*    85 92   BUN 16 16 15   CREATININE 2.0* 1.6* 1.5*   CALCIUM 9.8 9.3 9.4   PROT 8.5*  --   --    ALBUMIN 3.3*  --   --    BILITOT 0.4  --   --    ALKPHOS 122  --   --    AST 31  --   --    ALT 29  --   --    ANIONGAP 11 11 8       Significant Imaging: I have reviewed all pertinent imaging results/findings within the past 24 hours.

## 2024-08-09 NOTE — SUBJECTIVE & OBJECTIVE
Interval History:      IR consult for placement of right nephrostomy tube     Review of Systems   Constitutional: Negative.    HENT: Negative.     Gastrointestinal:  Positive for abdominal pain and nausea.     Objective:     Vital Signs (Most Recent):  Temp: 98.3 °F (36.8 °C) (08/09/24 0448)  Pulse: 75 (08/09/24 0448)  Resp: 18 (08/09/24 0448)  BP: (!) 111/53 (08/09/24 0448)  SpO2: 100 % (08/09/24 0448) Vital Signs (24h Range):  Temp:  [97.5 °F (36.4 °C)-99.3 °F (37.4 °C)] 98.3 °F (36.8 °C)  Pulse:  [68-85] 75  Resp:  [10-18] 18  SpO2:  [95 %-100 %] 100 %  BP: ()/(44-72) 111/53     Weight: 87 kg (191 lb 12.8 oz)  Body mass index is 28.32 kg/m².    Intake/Output Summary (Last 24 hours) at 8/9/2024 0702  Last data filed at 8/9/2024 0546  Gross per 24 hour   Intake 360 ml   Output 2675 ml   Net -2315 ml         Physical Exam  Constitutional:       General: She is not in acute distress.     Appearance: She is not ill-appearing.   Pulmonary:      Effort: Pulmonary effort is normal.      Breath sounds: Normal breath sounds.   Abdominal:      Palpations: Abdomen is soft.      Tenderness: There is abdominal tenderness.   Musculoskeletal:      Right lower leg: No edema.      Left lower leg: No edema.   Neurological:      Mental Status: She is alert.             Significant Labs: All pertinent labs within the past 24 hours have been reviewed.    Significant Imaging: I have reviewed all pertinent imaging results/findings within the past 24 hours.

## 2024-08-09 NOTE — PROGRESS NOTES
[facility] - Stepdown [facility] ([address] Medicine  Progress Note    Patient Name: Edita Riley  MRN: [id number]  Patient Class: IP- Inpatient   Admission Date: 8/7/2024  Length of Stay: 2 days  Attending Physician: Brittney Bolton MD  Primary Care Provider: Trina Vu MD        Subjective:     Principal Problem:ODESSA (acute kidney injury)        HPI:  Ms. Riley is a 59 YOF with PMHx of HTN, history of seizure disorder not on AEDs, Schatzki's ring /GERD, history of BLE DVTs, chronic anemia, anxiety/depression, fibromyalgia and chronic back pain, and history of pelvic radiation therapy for cervical cancer which was complicated by bilateral distal ureteral obstruction with bilateral hydronephrosis which was complicated by bowel perforation with colo-colonic anastomotic leak and peritonitis following colonic urinary conduit creation, s/p extended right colectomy with end ileostomy and left ureter-colonic conduit anastomotic stricture of ureteral implant to transverse colon conduit on 2/6/2024 with lysis of adhesions and takedown of ileostomy with current urinary ileostomy and a left percutaneous nephrostomy.    She presents to ED with abrupt onset of right flank pain several hours PTA and with complaints of epigastric abdominal pain, nausea, and vomiting for the last week. She notes generally feeling unwell with malaise/fatigue; also has complaints of intermittent light headiness, dizziness, and headaches. She denies SOB, MUNGUIA, CP, diarrhea, constipation, decreased appetite, or changes in PO intake.    In the ED HDS and afebrile. CBC with WBC 12, H/H 11.9/41, platelets 288. PT/INR 11/1.0, PTT 34. Chemistry with , K 4.2, chloride 112, CO2 18, BUN 16, SCr 2.0, glucose 103. LFTs WNL. UA 1+ protein, 1+ blood, nitrate positive, 3+ leukocytes, WBC 61, many bacteria. Urine culture in process. CT A/P severe right-sided hydronephrosis, secondary to an approximately 6 mm linear calculus just  proximal to the anastomosis. Postoperative changes of urinary diversion and left lower quadrant ileostomy. Mild left ureteral wall thickening which can be seen with pyelitis, recommend correlation with urinalysis.  Additional nonobstructing bilateral nephrolithiasis.    Urology consulted in ED with recommendations for IR consult for placement of right nephrostomy tube and no ABXs at current due to low infection concern.      The patient was admitted to the Hospital Medicine Service for further evaluation and management.     Overview/Hospital Course:  No notes on file    Interval History:     Patient had episode of vomiting this morning.   Complains of abdominal pain and right side flank pain. Says it feels more than yesterday.    Review of Systems   Constitutional:  Positive for appetite change and fatigue. Negative for chills and fever.   Respiratory: Negative.     Cardiovascular: Negative.    Gastrointestinal:  Positive for abdominal pain, nausea and vomiting.   Genitourinary:  Positive for flank pain.     Objective:     Vital Signs (Most Recent):  Temp: 98.4 °F (36.9 °C) (08/09/24 1156)  Pulse: 86 (08/09/24 1156)  Resp: 18 (08/09/24 1156)  BP: 120/71 (08/09/24 1156)  SpO2: 98 % (08/09/24 1156) Vital Signs (24h Range):  Temp:  [97.5 °F (36.4 °C)-99.4 °F (37.4 °C)] 98.4 °F (36.9 °C)  Pulse:  [68-86] 86  Resp:  [10-18] 18  SpO2:  [95 %-100 %] 98 %  BP: ()/(44-72) 120/71     Weight: 87 kg (191 lb 12.8 oz)  Body mass index is 28.32 kg/m².    Intake/Output Summary (Last 24 hours) at 8/9/2024 1453  Last data filed at 8/9/2024 1200  Gross per 24 hour   Intake 360 ml   Output 1900 ml   Net -1540 ml         Physical Exam  Constitutional:       General: She is not in acute distress.  Cardiovascular:      Pulses: Normal pulses.      Heart sounds: Normal heart sounds. No murmur heard.  Pulmonary:      Effort: Pulmonary effort is normal.      Breath sounds: Normal breath sounds.   Abdominal:      General: Abdomen is  flat. There is no distension.      Palpations: Abdomen is soft.      Tenderness: There is abdominal tenderness. There is no guarding.   Musculoskeletal:      Right lower leg: No edema.      Left lower leg: No edema.   Neurological:      Mental Status: She is alert.             Significant Labs: All pertinent labs within the past 24 hours have been reviewed.  BMP:   Recent Labs   Lab 08/08/24  0359 08/09/24  0423   GLU 85 92    143   K 4.2 4.1   * 115*   CO2 16* 20*   BUN 16 15   CREATININE 1.6* 1.5*   CALCIUM 9.3 9.4   MG 1.8  --      CBC:   Recent Labs   Lab 08/07/24  1644 08/07/24  1651 08/08/24  0359 08/09/24  0423   WBC 11.66  --  12.91* 11.24   HGB 11.9*  --  12.1 11.7*   HCT 40.7 39 40.5 40.0     --  254 227     CMP:   Recent Labs   Lab 08/07/24  1644 08/08/24  0359 08/09/24  0423    142 143   K 4.2 4.2 4.1   * 115* 115*   CO2 18* 16* 20*    85 92   BUN 16 16 15   CREATININE 2.0* 1.6* 1.5*   CALCIUM 9.8 9.3 9.4   PROT 8.5*  --   --    ALBUMIN 3.3*  --   --    BILITOT 0.4  --   --    ALKPHOS 122  --   --    AST 31  --   --    ALT 29  --   --    ANIONGAP 11 11 8       Significant Imaging: I have reviewed all pertinent imaging results/findings within the past 24 hours.    Assessment/Plan:      * ODESSA (acute kidney injury)  R hydronephrosis  Nephrostomy status  ODESSA on CKD  CKD, stage III  -admitted due to R hydronephrosis with complicated urological and colorectal history detailed in HPI  -at admission HDS and afebrile  -admission workup: CBC with WBC 12, H/H 11.9/41, platelets 288. PT/INR 11/1.0, PTT 34. Chemistry with , K 4.2, chloride 112, CO2 18, BUN 16, SCr 2.0, glucose 103. LFTs WNL. UA 1+ protein, 1+ blood, nitrate positive, 3+ leukocytes, WBC 61, many bacteria. Urine culture in process. CT A/P severe right-sided hydronephrosis, secondary to an approximately 6 mm linear calculus just proximal to the anastomosis. Postoperative changes of urinary diversion and left  lower quadrant ileostomy. Mild left ureteral wall thickening which can be seen with pyelitis, recommend correlation with urinalysis.  Additional nonobstructing bilateral nephrolithiasis.  -SCr at admission 2.0 >>> baseline SCr 1.2-1.5  -Urology consulted in ED with recommendations for IR consult for placement of right nephrostomy tube   -start levofloxacin   -strict I&Os  -PRN supportive care as indicated  -trend labs, address/replete electrolytes as indicated    - repeat CT abdomen - patient reports worsening abdominal pain    Gastro-esophageal reflux disease with esophagitis  -chronic  -no home therapies   -PRN supportive care as indicated      Anemia in chronic kidney disease  -chronic  -H/H stable at admission  -continue home iron supplement  -trend CBC, transfuse as indicated  -monitor     Essential hypertension  -chronic  -controlled  -no home antihypertensives  -address if indicated  -monitor     Fibromyalgia  -chronic  -PRN supportive care as indicated  -fall precautions      VTE Risk Mitigation (From admission, onward)           Ordered     heparin (porcine) injection 5,000 Units  Every 8 hours         08/08/24 1335     IP VTE HIGH RISK PATIENT  Once         08/07/24 2305     Place sequential compression device  Until discontinued         08/07/24 2305                    Discharge Planning   OK: 8/10/2024     Code Status: Full Code   Is the patient medically ready for discharge?:     Reason for patient still in hospital (select all that apply): Patient trending condition  Discharge Plan A: Home with family   Discharge Delays: None known at this time              Brittney Bolton MD  Department of Hospital Medicine   [facility] - [facility] ([place]

## 2024-08-09 NOTE — PLAN OF CARE
Pt was nausea and emesis, given iv zofran. Pt later complained of more nausea, dr prescribed phenergan. Med requested, by the time pt did not have nausea anymore, will be deferred to next time. Pt also complained of pain this shift, administered dilaudid. Pt had large watery diarrhea. Stool culture to be collected next time she has a bm. Pt is laying in bed, safety precautions in place, plan of care on going.        Problem: Adult Inpatient Plan of Care  Goal: Plan of Care Review  Outcome: Progressing  Goal: Patient-Specific Goal (Individualized)  Outcome: Progressing  Goal: Absence of Hospital-Acquired Illness or Injury  Outcome: Progressing  Goal: Optimal Comfort and Wellbeing  Outcome: Progressing  Goal: Readiness for Transition of Care  Outcome: Progressing     Problem: Acute Kidney Injury/Impairment  Goal: Fluid and Electrolyte Balance  Outcome: Progressing  Goal: Improved Oral Intake  Outcome: Progressing  Goal: Effective Renal Function  Outcome: Progressing     Problem: Wound  Goal: Optimal Coping  Outcome: Progressing  Goal: Optimal Functional Ability  Outcome: Progressing  Goal: Absence of Infection Signs and Symptoms  Outcome: Progressing  Goal: Improved Oral Intake  Outcome: Progressing  Goal: Optimal Pain Control and Function  Outcome: Progressing  Goal: Skin Health and Integrity  Outcome: Progressing  Goal: Optimal Wound Healing  Outcome: Progressing

## 2024-08-09 NOTE — PROGRESS NOTES
[facility] ([place] [address])  Urology  Progress Note    Patient Name: Edita Riley  MRN: [id number]  Admission Date: 8/7/2024  Hospital Length of Stay: 2 days  Code Status: Full Code   Attending Provider: Brittney Bolton MD   Primary Care Physician: Trina Vu MD    Subjective:     HPI:  Edita Riley is a 59 y.o. female who has a history of pelvic irradiation with ureteral strictures bilaterally.  History of colo-colonic anastomotic leak and peritonitis following colonic urinary conduit creation, s/p extended right colectomy with end ileostomy and left ureter-colonic conduit anastomotic stricture.  ft ureteral re implant to a transverse colon conduit on 2/6/2024, lysis of adhesions, takedown of ileostomy with ileosigmoid anastamosis.     She had severe right sided flank pain at 3PM on 8/7 with associated n/v, denies f/c, prompting her to go to ED. In the ED she has been AFVSS, WBC wnl, Cr 2.0 (baseline ~1.2). CT non con showing worsening right sided hydronephrosis from prior scan, and small slightly radiopaque mass in distal ureter, likely debris. No left sided ureteral stones. Urology consulted for recommendations.     Interval History:  NAEO.  AFVSS.  S/p R NT placement yesterday.  Good UOP.      R /620   Conduit: 750/150/280     Review of Systems: per HPI   Objective:     Temp:  [97.5 °F (36.4 °C)-99.3 °F (37.4 °C)] 98.3 °F (36.8 °C)  Pulse:  [68-85] 75  Resp:  [10-18] 18  SpO2:  [95 %-100 %] 100 %  BP: ()/(44-72) 111/53     Body mass index is 28.32 kg/m².           Drains       Drain  Duration                  Urostomy 02/06/24 1815 ureterostomy, left 184 days         Urostomy 02/25/24 1600  days         Nephrostomy 08/08/24 1547 Right 10 Fr. <1 day                     Physical Exam  Constitutional:       Appearance: Normal appearance.   HENT:      Head: Normocephalic and atraumatic.   Abdominal:      General: Abdomen is flat.      Palpations: Abdomen  is soft.      Comments: LLQ ileostomy, ostomy pink and patent, appears healthy  Clear yellow urine in bag  Abd soft, non tender, non distended  Mild right flank tenderness, no left sided flank tenderness    R NT in place draining clear brook tinged urine    Skin:     General: Skin is warm.      Capillary Refill: Capillary refill takes less than 2 seconds.   Neurological:      General: No focal deficit present.      Mental Status: She is alert and oriented to person, place, and time.   Psychiatric:         Mood and Affect: Mood normal.           Significant Labs:    BMP:  Recent Labs   Lab 08/07/24  1644 08/08/24  0359 08/09/24  0423    142 143   K 4.2 4.2 4.1   * 115* 115*   CO2 18* 16* 20*   BUN 16 16 15   CREATININE 2.0* 1.6* 1.5*   CALCIUM 9.8 9.3 9.4       CBC:   Recent Labs   Lab 08/07/24  1644 08/07/24  1651 08/08/24  0359 08/09/24  0423   WBC 11.66  --  12.91* 11.24   HGB 11.9*  --  12.1 11.7*   HCT 40.7 39 40.5 40.0     --  254 227       All pertinent labs results from the past 24 hours have been reviewed.    Significant Imaging:  All pertinent imaging results/findings from the past 24 hours have been reviewed.                  Assessment/Plan:     Ileostomy in place  59yoF w/complex urological history currently with ileostomy in place with concern for right sided hydro and ODESSA.      - s/p R NT placement on 8/8    - Appears there may be small debris in right distal ureter near ureteroileal anastomosis   - Right sided hydronephrosis slightly worsened from prior scan   - Currently low concern for infection    - Urine culture growing multiple organisms, recommending treating this with broad spectrum oral abx for 2 weeks in light of upcoming urologic procedure    - Will plan for antegrade ureteroscopy to evaluate right ureter after NT placement.  Will arrange outpatient urology f/u for this.     - Recommend admission to hospital medicine for management of comorbidities   - Recommend Flomax   -  Recommend IVF   - Please reach out to urology team if patient's clinical status changes   - urology to sign off at this time    Please feel free to reach out with any questions.         VTE Risk Mitigation (From admission, onward)           Ordered     heparin (porcine) injection 5,000 Units  Every 8 hours         08/08/24 1335     IP VTE HIGH RISK PATIENT  Once         08/07/24 2305     Place sequential compression device  Until discontinued         08/07/24 2305                    Maryann Darnell MD  Urology  [facility] - Stepdown Flex ([place]-)

## 2024-08-09 NOTE — SUBJECTIVE & OBJECTIVE
Interval History:  NAEO.  AFVSS.  S/p R NT placement yesterday.  Good UOP.      R /620   Conduit: 750/150/280     Review of Systems: per HPI   Objective:     Temp:  [97.5 °F (36.4 °C)-99.3 °F (37.4 °C)] 98.3 °F (36.8 °C)  Pulse:  [68-85] 75  Resp:  [10-18] 18  SpO2:  [95 %-100 %] 100 %  BP: ()/(44-72) 111/53     Body mass index is 28.32 kg/m².           Drains       Drain  Duration                  Urostomy 02/06/24 1815 ureterostomy, left 184 days         Urostomy 02/25/24 1600  days         Nephrostomy 08/08/24 1547 Right 10 Fr. <1 day                     Physical Exam  Constitutional:       Appearance: Normal appearance.   HENT:      Head: Normocephalic and atraumatic.   Abdominal:      General: Abdomen is flat.      Palpations: Abdomen is soft.      Comments: LLQ ileostomy, ostomy pink and patent, appears healthy  Clear yellow urine in bag  Abd soft, non tender, non distended  Mild right flank tenderness, no left sided flank tenderness    R NT in place draining clear brook tinged urine    Skin:     General: Skin is warm.      Capillary Refill: Capillary refill takes less than 2 seconds.   Neurological:      General: No focal deficit present.      Mental Status: She is alert and oriented to person, place, and time.   Psychiatric:         Mood and Affect: Mood normal.           Significant Labs:    BMP:  Recent Labs   Lab 08/07/24  1644 08/08/24  0359 08/09/24  0423    142 143   K 4.2 4.2 4.1   * 115* 115*   CO2 18* 16* 20*   BUN 16 16 15   CREATININE 2.0* 1.6* 1.5*   CALCIUM 9.8 9.3 9.4       CBC:   Recent Labs   Lab 08/07/24  1644 08/07/24  1651 08/08/24  0359 08/09/24  0423   WBC 11.66  --  12.91* 11.24   HGB 11.9*  --  12.1 11.7*   HCT 40.7 39 40.5 40.0     --  254 227       All pertinent labs results from the past 24 hours have been reviewed.    Significant Imaging:  All pertinent imaging results/findings from the past 24 hours have been reviewed.

## 2024-08-10 LAB
ANION GAP SERPL CALC-SCNC: 13 MMOL/L (ref 8–16)
BASOPHILS # BLD AUTO: 0.02 K/UL (ref 0–0.2)
BASOPHILS NFR BLD: 0.2 % (ref 0–1.9)
BUN SERPL-MCNC: 13 MG/DL (ref 8–23)
CALCIUM SERPL-MCNC: 9.1 MG/DL (ref 8.7–10.5)
CHLORIDE SERPL-SCNC: 115 MMOL/L (ref 95–110)
CO2 SERPL-SCNC: 18 MMOL/L (ref 23–29)
CREAT SERPL-MCNC: 1.2 MG/DL (ref 0.5–1.4)
DIFFERENTIAL METHOD BLD: ABNORMAL
EOSINOPHIL # BLD AUTO: 0.1 K/UL (ref 0–0.5)
EOSINOPHIL NFR BLD: 0.7 % (ref 0–8)
ERYTHROCYTE [DISTWIDTH] IN BLOOD BY AUTOMATED COUNT: 15.4 % (ref 11.5–14.5)
EST. GFR  (NO RACE VARIABLE): 51.5 ML/MIN/1.73 M^2
GLUCOSE SERPL-MCNC: 89 MG/DL (ref 70–110)
HCT VFR BLD AUTO: 38.4 % (ref 37–48.5)
HGB BLD-MCNC: 11.6 G/DL (ref 12–16)
IMM GRANULOCYTES # BLD AUTO: 0.02 K/UL (ref 0–0.04)
IMM GRANULOCYTES NFR BLD AUTO: 0.2 % (ref 0–0.5)
LYMPHOCYTES # BLD AUTO: 1.2 K/UL (ref 1–4.8)
LYMPHOCYTES NFR BLD: 14.2 % (ref 18–48)
MCH RBC QN AUTO: 27 PG (ref 27–31)
MCHC RBC AUTO-ENTMCNC: 30.2 G/DL (ref 32–36)
MCV RBC AUTO: 90 FL (ref 82–98)
MONOCYTES # BLD AUTO: 0.9 K/UL (ref 0.3–1)
MONOCYTES NFR BLD: 11.3 % (ref 4–15)
NEUTROPHILS # BLD AUTO: 6 K/UL (ref 1.8–7.7)
NEUTROPHILS NFR BLD: 73.4 % (ref 38–73)
NRBC BLD-RTO: 0 /100 WBC
PLATELET # BLD AUTO: 305 K/UL (ref 150–450)
PMV BLD AUTO: 10.6 FL (ref 9.2–12.9)
POTASSIUM SERPL-SCNC: 3.5 MMOL/L (ref 3.5–5.1)
RBC # BLD AUTO: 4.29 M/UL (ref 4–5.4)
SODIUM SERPL-SCNC: 146 MMOL/L (ref 136–145)
WBC # BLD AUTO: 8.22 K/UL (ref 3.9–12.7)

## 2024-08-10 PROCEDURE — 25000003 PHARM REV CODE 250: Performed by: NURSE PRACTITIONER

## 2024-08-10 PROCEDURE — 63600175 PHARM REV CODE 636 W HCPCS: Performed by: NURSE PRACTITIONER

## 2024-08-10 PROCEDURE — 25000003 PHARM REV CODE 250: Performed by: STUDENT IN AN ORGANIZED HEALTH CARE EDUCATION/TRAINING PROGRAM

## 2024-08-10 PROCEDURE — 87046 STOOL CULTR AEROBIC BACT EA: CPT | Performed by: STUDENT IN AN ORGANIZED HEALTH CARE EDUCATION/TRAINING PROGRAM

## 2024-08-10 PROCEDURE — 80048 BASIC METABOLIC PNL TOTAL CA: CPT

## 2024-08-10 PROCEDURE — 63600175 PHARM REV CODE 636 W HCPCS: Performed by: STUDENT IN AN ORGANIZED HEALTH CARE EDUCATION/TRAINING PROGRAM

## 2024-08-10 PROCEDURE — 87045 FECES CULTURE AEROBIC BACT: CPT | Performed by: STUDENT IN AN ORGANIZED HEALTH CARE EDUCATION/TRAINING PROGRAM

## 2024-08-10 PROCEDURE — 85025 COMPLETE CBC W/AUTO DIFF WBC: CPT

## 2024-08-10 PROCEDURE — 87449 NOS EACH ORGANISM AG IA: CPT | Performed by: STUDENT IN AN ORGANIZED HEALTH CARE EDUCATION/TRAINING PROGRAM

## 2024-08-10 PROCEDURE — 87427 SHIGA-LIKE TOXIN AG IA: CPT | Mod: 59 | Performed by: STUDENT IN AN ORGANIZED HEALTH CARE EDUCATION/TRAINING PROGRAM

## 2024-08-10 PROCEDURE — 20600001 HC STEP DOWN PRIVATE ROOM

## 2024-08-10 PROCEDURE — 36415 COLL VENOUS BLD VENIPUNCTURE: CPT

## 2024-08-10 PROCEDURE — 94761 N-INVAS EAR/PLS OXIMETRY MLT: CPT

## 2024-08-10 RX ORDER — PANTOPRAZOLE SODIUM 40 MG/1
40 TABLET, DELAYED RELEASE ORAL DAILY
Status: DISCONTINUED | OUTPATIENT
Start: 2024-08-10 | End: 2024-08-12 | Stop reason: HOSPADM

## 2024-08-10 RX ORDER — AMLODIPINE BESYLATE 5 MG/1
5 TABLET ORAL DAILY
Status: DISCONTINUED | OUTPATIENT
Start: 2024-08-10 | End: 2024-08-12 | Stop reason: HOSPADM

## 2024-08-10 RX ORDER — ALUMINUM HYDROXIDE, MAGNESIUM HYDROXIDE, AND SIMETHICONE 1200; 120; 1200 MG/30ML; MG/30ML; MG/30ML
30 SUSPENSION ORAL EVERY 6 HOURS PRN
Status: DISCONTINUED | OUTPATIENT
Start: 2024-08-10 | End: 2024-08-12 | Stop reason: HOSPADM

## 2024-08-10 RX ORDER — AMLODIPINE BESYLATE 5 MG/1
5 TABLET ORAL DAILY
Status: DISCONTINUED | OUTPATIENT
Start: 2024-08-10 | End: 2024-08-10

## 2024-08-10 RX ADMIN — HYDROMORPHONE HYDROCHLORIDE 1 MG: 1 INJECTION, SOLUTION INTRAMUSCULAR; INTRAVENOUS; SUBCUTANEOUS at 08:08

## 2024-08-10 RX ADMIN — PANTOPRAZOLE SODIUM 40 MG: 40 TABLET, DELAYED RELEASE ORAL at 02:08

## 2024-08-10 RX ADMIN — MIRTAZAPINE 30 MG: 15 TABLET, FILM COATED ORAL at 08:08

## 2024-08-10 RX ADMIN — HEPARIN SODIUM 5000 UNITS: 5000 INJECTION INTRAVENOUS; SUBCUTANEOUS at 06:08

## 2024-08-10 RX ADMIN — HYDROMORPHONE HYDROCHLORIDE 1 MG: 1 INJECTION, SOLUTION INTRAMUSCULAR; INTRAVENOUS; SUBCUTANEOUS at 06:08

## 2024-08-10 RX ADMIN — ALUMINUM HYDROXIDE, MAGNESIUM HYDROXIDE, AND SIMETHICONE 30 ML: 200; 200; 20 SUSPENSION ORAL at 02:08

## 2024-08-10 RX ADMIN — HEPARIN SODIUM 5000 UNITS: 5000 INJECTION INTRAVENOUS; SUBCUTANEOUS at 01:08

## 2024-08-10 RX ADMIN — CEFEPIME 1 G: 1 INJECTION, POWDER, FOR SOLUTION INTRAMUSCULAR; INTRAVENOUS at 03:08

## 2024-08-10 RX ADMIN — AMLODIPINE BESYLATE 5 MG: 5 TABLET ORAL at 08:08

## 2024-08-10 RX ADMIN — FERROUS SULFATE TAB EC 325 MG (65 MG FE EQUIVALENT) 1 EACH: 325 (65 FE) TABLET DELAYED RESPONSE at 08:08

## 2024-08-10 RX ADMIN — CEFEPIME 1 G: 1 INJECTION, POWDER, FOR SOLUTION INTRAMUSCULAR; INTRAVENOUS at 01:08

## 2024-08-10 RX ADMIN — HEPARIN SODIUM 5000 UNITS: 5000 INJECTION INTRAVENOUS; SUBCUTANEOUS at 10:08

## 2024-08-10 RX ADMIN — ONDANSETRON 4 MG: 2 INJECTION INTRAMUSCULAR; INTRAVENOUS at 06:08

## 2024-08-10 RX ADMIN — ONDANSETRON 4 MG: 2 INJECTION INTRAMUSCULAR; INTRAVENOUS at 08:08

## 2024-08-10 NOTE — PLAN OF CARE
Pt d ODESSA. AAOX4. NAD. No c/o at present. Stable. IVF infusing as ordered. Explained current plan of care. Voiced understanding. Questions answered.

## 2024-08-10 NOTE — PROGRESS NOTES
Pharmacist Renal Dose Adjustment Note    Edita Riley is a 59 y.o. female being treated with cefepime    Patient Data:    Vital Signs (Most Recent):  Temp: 98 °F (36.7 °C) (08/10/24 0735)  Pulse: 69 (08/10/24 0735)  Resp: 17 (08/10/24 0735)  BP: 127/61 (08/10/24 0735)  SpO2: 100 % (08/10/24 0735) Vital Signs (72h Range):  Temp:  [97.5 °F (36.4 °C)-99.4 °F (37.4 °C)]   Pulse:  [60-95]   Resp:  [10-20]   BP: ()/()   SpO2:  [95 %-100 %]      Recent Labs   Lab 08/07/24  1644 08/08/24  0359 08/09/24  0423   CREATININE 2.0* 1.6* 1.5*     Serum creatinine: 1.5 mg/dL (H) 08/09/24 0423  Estimated creatinine clearance: 46.3 mL/min (A)    Cefepime changed to 1 g q12h    Austyn Garcia Pharm.D., BCPS  [id number]

## 2024-08-10 NOTE — PROGRESS NOTES
[facility] ([address] Medicine  Progress Note    Patient Name: Edita Riley  MRN: [id number]  Patient Class: IP- Inpatient   Admission Date: 8/7/2024  Length of Stay: 3 days  Attending Physician: Brittney Bolton MD  Primary Care Provider: Trina Vu MD        Subjective:     Principal Problem:ODESSA (acute kidney injury)        HPI:  Ms. Riley is a 59 YOF with PMHx of HTN, history of seizure disorder not on AEDs, Schatzki's ring /GERD, history of BLE DVTs, chronic anemia, anxiety/depression, fibromyalgia and chronic back pain, and history of pelvic radiation therapy for cervical cancer which was complicated by bilateral distal ureteral obstruction with bilateral hydronephrosis which was complicated by bowel perforation with colo-colonic anastomotic leak and peritonitis following colonic urinary conduit creation, s/p extended right colectomy with end ileostomy and left ureter-colonic conduit anastomotic stricture of ureteral implant to transverse colon conduit on 2/6/2024 with lysis of adhesions and takedown of ileostomy with current urinary ileostomy and a left percutaneous nephrostomy.    She presents to ED with abrupt onset of right flank pain several hours PTA and with complaints of epigastric abdominal pain, nausea, and vomiting for the last week. She notes generally feeling unwell with malaise/fatigue; also has complaints of intermittent light headiness, dizziness, and headaches. She denies SOB, MUNGUIA, CP, diarrhea, constipation, decreased appetite, or changes in PO intake.    In the ED HDS and afebrile. CBC with WBC 12, H/H 11.9/41, platelets 288. PT/INR 11/1.0, PTT 34. Chemistry with , K 4.2, chloride 112, CO2 18, BUN 16, SCr 2.0, glucose 103. LFTs WNL. UA 1+ protein, 1+ blood, nitrate positive, 3+ leukocytes, WBC 61, many bacteria. Urine culture in process. CT A/P severe right-sided hydronephrosis, secondary to an approximately 6 mm linear calculus just  proximal to the anastomosis. Postoperative changes of urinary diversion and left lower quadrant ileostomy. Mild left ureteral wall thickening which can be seen with pyelitis, recommend correlation with urinalysis.  Additional nonobstructing bilateral nephrolithiasis.    Urology consulted in ED with recommendations for IR consult for placement of right nephrostomy tube and no ABXs at current due to low infection concern.      The patient was admitted to the Hospital Medicine Service for further evaluation and management.     Overview/Hospital Course:  No notes on file    Interval History:     Patient continues to have episodes of vomiting.   C/o abdominal pain- a little improved than yesterday. Started on cefepime yesterday.   Today ODESSA improved. IVF discontinued.  Still unable to tolerate PO intake   Will start weaning off IV pain medication today    Review of Systems   Constitutional:  Positive for appetite change and fatigue. Negative for fever.   HENT:  Negative for congestion.      Objective:     Vital Signs (Most Recent):  Temp: 98 °F (36.7 °C) (08/10/24 0735)  Pulse: 81 (08/10/24 0914)  Resp: 17 (08/10/24 0735)  BP: 127/61 (08/10/24 0836)  SpO2: 100 % (08/10/24 0914) Vital Signs (24h Range):  Temp:  [98 °F (36.7 °C)-98.7 °F (37.1 °C)] 98 °F (36.7 °C)  Pulse:  [69-90] 81  Resp:  [16-20] 17  SpO2:  [98 %-100 %] 100 %  BP: (120-166)/() 127/61     Weight: 87 kg (191 lb 12.8 oz)  Body mass index is 28.32 kg/m².    Intake/Output Summary (Last 24 hours) at 8/10/2024 1100  Last data filed at 8/10/2024 0626  Gross per 24 hour   Intake --   Output 995 ml   Net -995 ml         Physical Exam  Constitutional:       General: She is not in acute distress.     Appearance: She is ill-appearing.   Cardiovascular:      Pulses: Normal pulses.      Heart sounds: Normal heart sounds.   Pulmonary:      Effort: Pulmonary effort is normal.      Breath sounds: Normal breath sounds.   Abdominal:      General: Abdomen is flat.  There is no distension.      Palpations: Abdomen is soft.      Tenderness: There is abdominal tenderness.   Musculoskeletal:      Right lower leg: No edema.      Left lower leg: No edema.   Neurological:      Mental Status: She is alert. Mental status is at baseline.             Significant Labs: All pertinent labs within the past 24 hours have been reviewed.  BMP:   Recent Labs   Lab 08/10/24  0641   GLU 89   *   K 3.5   *   CO2 18*   BUN 13   CREATININE 1.2   CALCIUM 9.1     CBC:   Recent Labs   Lab 08/09/24  0423 08/10/24  0641   WBC 11.24 8.22   HGB 11.7* 11.6*   HCT 40.0 38.4    305     CMP:   Recent Labs   Lab 08/09/24 0423 08/10/24  0641    146*   K 4.1 3.5   * 115*   CO2 20* 18*   GLU 92 89   BUN 15 13   CREATININE 1.5* 1.2   CALCIUM 9.4 9.1   ANIONGAP 8 13       Significant Imaging: I have reviewed all pertinent imaging results/findings within the past 24 hours.    Assessment/Plan:      * ODESSA (acute kidney injury)  R hydronephrosis  Nephrostomy status  ODESSA on CKD  CKD, stage III  -admitted due to R hydronephrosis with complicated urological and colorectal history detailed in HPI  -at admission HDS and afebrile  -admission workup: CBC with WBC 12, H/H 11.9/41, platelets 288. PT/INR 11/1.0, PTT 34. Chemistry with , K 4.2, chloride 112, CO2 18, BUN 16, SCr 2.0, glucose 103. LFTs WNL. UA 1+ protein, 1+ blood, nitrate positive, 3+ leukocytes, WBC 61, many bacteria. Urine culture in process. CT A/P severe right-sided hydronephrosis, secondary to an approximately 6 mm linear calculus just proximal to the anastomosis. Postoperative changes of urinary diversion and left lower quadrant ileostomy. Mild left ureteral wall thickening which can be seen with pyelitis, recommend correlation with urinalysis.  Additional nonobstructing bilateral nephrolithiasis.  -SCr at admission 2.0 >>> baseline SCr 1.2-1.5  -Urology consulted in ED with recommendations for IR consult for placement of  right nephrostomy tube   -start levofloxacin. Switched to Cefepime.   -strict I&Os  -PRN supportive care as indicated  -trend labs, address/replete electrolytes as indicated  - repeat CT abdomen - no significant changes  - discontinued IVF    Gastro-esophageal reflux disease with esophagitis  -chronic  -no home therapies   -PRN supportive care as indicated      Anemia in chronic kidney disease  -chronic  -H/H stable at admission  -continue home iron supplement  -trend CBC, transfuse as indicated  -monitor     Essential hypertension  -chronic  -controlled  -no home antihypertensives  -address if indicated  -monitor     Fibromyalgia  -chronic  -PRN supportive care as indicated  -fall precautions      VTE Risk Mitigation (From admission, onward)           Ordered     heparin (porcine) injection 5,000 Units  Every 8 hours         08/08/24 1335     IP VTE HIGH RISK PATIENT  Once         08/07/24 2305     Place sequential compression device  Until discontinued         08/07/24 2305                    Discharge Planning   OK: 8/10/2024     Code Status: Full Code   Is the patient medically ready for discharge?:     Reason for patient still in hospital (select all that apply): Patient trending condition  Discharge Plan A: Home with family   Discharge Delays: None known at this time              Brittney Bolton MD  Department of Hospital Medicine   [facility] ([place]-)

## 2024-08-10 NOTE — PLAN OF CARE
Pt resting. Denies nausea or pain at present time. PPI and Maalox added to med regimen today due to c/o reflux and abd discomfort. Reinforced plan of care. Safety maintained.

## 2024-08-10 NOTE — NURSING
[id number] VENECIA   [facility]  [facility]1965     AGE:59 y.o.     SEX:female      STATUS:Full Code      ISOLATION:No active isolations   ALLERGIES:Bee sting [allergen ext-venom-honey bee] and Grass pollen-bermuda, standard   ADMIT DATE:  2024   PHYSICIAN:  Brittney Bolton MD   DIAGNOSIS: Acute kidney injury [N17.9]  Hydronephrosis, unspecified hydronephrosis type [N13.30] ODESSA (acute kidney injury)  CC: Abdominal Pain (Right flank pain radiating to her back with vomiting)    CONSULTS:  Past Medical History:   Diagnosis Date    Abnormal mammogram 2020    Abnormal mammogram 2020    Acute blood loss anemia     Acute deep vein thrombosis (DVT) of lower extremity 2020    Advance care planning 2021    ODESSA (acute kidney injury) 2020    Anemia due to chronic blood loss     Anemia due to chronic kidney disease     Anemia due to chronic kidney disease 2020    Anxiety     Bilateral ureteral obstruction 2020    Bilious vomiting with nausea 2023    Cardiovascular event risk -- low 2015    ASCVD 10-year risk 1.9% (with optimal risk factors 1.3%) as of 2015     Cervical cancer 2014    Chronic back pain     Colostomy care     Deep vein thrombosis     Depression     Diarrhea due to malabsorption 2018    Difficult intubation     Discharge planning issues 2021    Disorder of kidney and ureter     DVT of lower extremity, bilateral 2020    Edema 04/10/2023    Emphysema of lung 04/10/2023    Fibromyalgia     Fungemia 2020    Generalized abdominal pain 2020    GERD (gastroesophageal reflux disease)     Hemifacial spasm 2015    Hiatal hernia 2014    History of cervical cancer 10/11/2018    History of essential hypertension 2015    Not requiring medications at this time  BP is low- concern that this may correlate with increased stool output from stoma. Encourage her to contact GI and her PCP.    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 Bx were negative.     Seizure-like activity 12/02/2018    Seizures     Sleep stage dysfunction     Noted on PSG 06/2017; negative for obstructive sleep apnea     Stroke     Urinary tract infection associated with nephrostomy catheter 06/11/2020    Wound infection after surgery 09/24/2020     Scheduled procedure(s):   Labs to Monitor (no values):   Recent Vitals:  Temp: 98.7 °F (37.1 °C)  Pulse: 78  Resp: 17  SpO2: 98 %  BP: (!) 161/83    Vitals:    08/09/24 2343 08/09/24 2345 08/10/24 0457 08/10/24 0510   BP: (!) 151/107 (!) 151/107 (!) 161/83    BP Location:  Left arm     Patient Position:  Lying     Pulse: 90 81 78    Resp: 20 20 17    Temp:  98.6 °F (37 °C) 98 °F (36.7 °C) 98.7 °F (37.1 °C)   TempSrc:  Oral  Oral   SpO2: 100% 100% 98%    Weight:       Height:            Respiratory:    Cardiac: Rhythm: normal sinus rhythm      Wt Readings from Last 2 Encounters:   08/09/24 87 kg (191 lb 12.8 oz)   03/15/24 83.3 kg (183 lb 12.1 oz)     GI/: Diet Adult Regular Standard Tray   Last Bowel Movement: 08/09/24  Patient had 1 episode of nausea zofran PO given and 2  "episode of  vomiting phenergan given IV and Zofran IV. Urostomy intact, clean and color even, nephrostomy site intact, clean, dry no swollen, or redness present, tender to touch.   Neuro: WDL  Little catheter: No   Skin:ex:Incision    Ascencion Score: 21      Lines/Drains/Airways       Drain  Duration                  Urostomy 02/06/24 1815 ureterostomy, left 185 days         Urostomy 02/25/24 1600  days         Nephrostomy 08/08/24 1547 Right 10 Fr. 1 day              Peripheral Intravenous Line  Duration                  Peripheral IV - Single Lumen 08/07/24 1644 22 G Left Antecubital 2 days                  Antibiotics (From admission, onward)      Start     Stop Route Frequency Ordered    08/09/24 1900  ceFEPIme (MAXIPIME) 1 g in D5W 100 mL IVPB (MB+)         -- IV Every 8 hours (non-standard times) 08/09/24 1749          VTE Risk Mitigation (From admission, onward)           Ordered     heparin (porcine) injection 5,000 Units  Every 8 hours         08/08/24 1335     IP VTE HIGH RISK PATIENT  Once         08/07/24 2305     Place sequential compression device  Until discontinued         08/07/24 2305                  Glycemic control:No results for input(s): "POCTGLUCOSE" in the last 168 hours.  Fall risk: standard interventions, bed alarm, chair alarm  Mobility: GEMS ([place] Early Mobility Scale): Level 4-Independent activities    Nursing Update/Plan of Care: Patient A&O X4 breathing even and unlabored. Personal belonging within reach, ongoing monitoring, safety maintain at all time.   "

## 2024-08-10 NOTE — SUBJECTIVE & OBJECTIVE
Interval History:     Patient continues to have episodes of vomiting.   C/o abdominal pain- a little improved than yesterday. Started on cefepime yesterday.   Today ODESSA improved. IVF discontinued.  Still unable to tolerate PO intake     Review of Systems   Constitutional:  Positive for appetite change and fatigue. Negative for fever.   HENT:  Negative for congestion.      Objective:     Vital Signs (Most Recent):  Temp: 98 °F (36.7 °C) (08/10/24 0735)  Pulse: 81 (08/10/24 0914)  Resp: 17 (08/10/24 0735)  BP: 127/61 (08/10/24 0836)  SpO2: 100 % (08/10/24 0914) Vital Signs (24h Range):  Temp:  [98 °F (36.7 °C)-98.7 °F (37.1 °C)] 98 °F (36.7 °C)  Pulse:  [69-90] 81  Resp:  [16-20] 17  SpO2:  [98 %-100 %] 100 %  BP: (120-166)/() 127/61     Weight: 87 kg (191 lb 12.8 oz)  Body mass index is 28.32 kg/m².    Intake/Output Summary (Last 24 hours) at 8/10/2024 1100  Last data filed at 8/10/2024 0626  Gross per 24 hour   Intake --   Output 995 ml   Net -995 ml         Physical Exam  Constitutional:       General: She is not in acute distress.     Appearance: She is ill-appearing.   Cardiovascular:      Pulses: Normal pulses.      Heart sounds: Normal heart sounds.   Pulmonary:      Effort: Pulmonary effort is normal.      Breath sounds: Normal breath sounds.   Abdominal:      General: Abdomen is flat. There is no distension.      Palpations: Abdomen is soft.      Tenderness: There is abdominal tenderness.   Musculoskeletal:      Right lower leg: No edema.      Left lower leg: No edema.   Neurological:      Mental Status: She is alert. Mental status is at baseline.             Significant Labs: All pertinent labs within the past 24 hours have been reviewed.  BMP:   Recent Labs   Lab 08/10/24  0641   GLU 89   *   K 3.5   *   CO2 18*   BUN 13   CREATININE 1.2   CALCIUM 9.1     CBC:   Recent Labs   Lab 08/09/24  0423 08/10/24  0641   WBC 11.24 8.22   HGB 11.7* 11.6*   HCT 40.0 38.4    305     CMP:    Recent Labs   Lab 08/09/24  0423 08/10/24  0641    146*   K 4.1 3.5   * 115*   CO2 20* 18*   GLU 92 89   BUN 15 13   CREATININE 1.5* 1.2   CALCIUM 9.4 9.1   ANIONGAP 8 13       Significant Imaging: I have reviewed all pertinent imaging results/findings within the past 24 hours.

## 2024-08-11 LAB
ANION GAP SERPL CALC-SCNC: 9 MMOL/L (ref 8–16)
BASOPHILS # BLD AUTO: 0.03 K/UL (ref 0–0.2)
BASOPHILS NFR BLD: 0.5 % (ref 0–1.9)
BUN SERPL-MCNC: 16 MG/DL (ref 8–23)
CALCIUM SERPL-MCNC: 9.6 MG/DL (ref 8.7–10.5)
CHLORIDE SERPL-SCNC: 113 MMOL/L (ref 95–110)
CO2 SERPL-SCNC: 24 MMOL/L (ref 23–29)
CREAT SERPL-MCNC: 1.4 MG/DL (ref 0.5–1.4)
DIFFERENTIAL METHOD BLD: ABNORMAL
EOSINOPHIL # BLD AUTO: 0.2 K/UL (ref 0–0.5)
EOSINOPHIL NFR BLD: 2.8 % (ref 0–8)
ERYTHROCYTE [DISTWIDTH] IN BLOOD BY AUTOMATED COUNT: 15 % (ref 11.5–14.5)
EST. GFR  (NO RACE VARIABLE): 42.8 ML/MIN/1.73 M^2
GLUCOSE SERPL-MCNC: 81 MG/DL (ref 70–110)
HCT VFR BLD AUTO: 42.1 % (ref 37–48.5)
HGB BLD-MCNC: 12.4 G/DL (ref 12–16)
IMM GRANULOCYTES # BLD AUTO: 0.02 K/UL (ref 0–0.04)
IMM GRANULOCYTES NFR BLD AUTO: 0.3 % (ref 0–0.5)
LYMPHOCYTES # BLD AUTO: 1.4 K/UL (ref 1–4.8)
LYMPHOCYTES NFR BLD: 21.5 % (ref 18–48)
MCH RBC QN AUTO: 26.6 PG (ref 27–31)
MCHC RBC AUTO-ENTMCNC: 29.5 G/DL (ref 32–36)
MCV RBC AUTO: 90 FL (ref 82–98)
MONOCYTES # BLD AUTO: 0.8 K/UL (ref 0.3–1)
MONOCYTES NFR BLD: 13.3 % (ref 4–15)
NEUTROPHILS # BLD AUTO: 3.9 K/UL (ref 1.8–7.7)
NEUTROPHILS NFR BLD: 61.6 % (ref 38–73)
NRBC BLD-RTO: 0 /100 WBC
PLATELET # BLD AUTO: 294 K/UL (ref 150–450)
PMV BLD AUTO: 9.7 FL (ref 9.2–12.9)
POTASSIUM SERPL-SCNC: 3.3 MMOL/L (ref 3.5–5.1)
RBC # BLD AUTO: 4.66 M/UL (ref 4–5.4)
SODIUM SERPL-SCNC: 146 MMOL/L (ref 136–145)
WBC # BLD AUTO: 6.32 K/UL (ref 3.9–12.7)

## 2024-08-11 PROCEDURE — 63600175 PHARM REV CODE 636 W HCPCS: Performed by: STUDENT IN AN ORGANIZED HEALTH CARE EDUCATION/TRAINING PROGRAM

## 2024-08-11 PROCEDURE — 36415 COLL VENOUS BLD VENIPUNCTURE: CPT

## 2024-08-11 PROCEDURE — 25000003 PHARM REV CODE 250: Performed by: NURSE PRACTITIONER

## 2024-08-11 PROCEDURE — 80048 BASIC METABOLIC PNL TOTAL CA: CPT

## 2024-08-11 PROCEDURE — 85025 COMPLETE CBC W/AUTO DIFF WBC: CPT

## 2024-08-11 PROCEDURE — 25000003 PHARM REV CODE 250: Performed by: STUDENT IN AN ORGANIZED HEALTH CARE EDUCATION/TRAINING PROGRAM

## 2024-08-11 PROCEDURE — 20600001 HC STEP DOWN PRIVATE ROOM

## 2024-08-11 PROCEDURE — 94761 N-INVAS EAR/PLS OXIMETRY MLT: CPT

## 2024-08-11 RX ORDER — OXYCODONE AND ACETAMINOPHEN 5; 325 MG/1; MG/1
1 TABLET ORAL EVERY 4 HOURS PRN
Status: DISCONTINUED | OUTPATIENT
Start: 2024-08-11 | End: 2024-08-12 | Stop reason: HOSPADM

## 2024-08-11 RX ORDER — POTASSIUM CHLORIDE 750 MG/1
30 CAPSULE, EXTENDED RELEASE ORAL ONCE
Status: COMPLETED | OUTPATIENT
Start: 2024-08-11 | End: 2024-08-11

## 2024-08-11 RX ORDER — OXYCODONE AND ACETAMINOPHEN 7.5; 325 MG/1; MG/1
1 TABLET ORAL EVERY 6 HOURS PRN
Status: DISCONTINUED | OUTPATIENT
Start: 2024-08-11 | End: 2024-08-12 | Stop reason: HOSPADM

## 2024-08-11 RX ORDER — OXYCODONE AND ACETAMINOPHEN 7.5; 325 MG/1; MG/1
1 TABLET ORAL EVERY 4 HOURS PRN
Status: DISCONTINUED | OUTPATIENT
Start: 2024-08-11 | End: 2024-08-11

## 2024-08-11 RX ADMIN — MIRTAZAPINE 30 MG: 15 TABLET, FILM COATED ORAL at 08:08

## 2024-08-11 RX ADMIN — AMLODIPINE BESYLATE 5 MG: 5 TABLET ORAL at 08:08

## 2024-08-11 RX ADMIN — HEPARIN SODIUM 5000 UNITS: 5000 INJECTION INTRAVENOUS; SUBCUTANEOUS at 09:08

## 2024-08-11 RX ADMIN — ONDANSETRON 8 MG: 8 TABLET, ORALLY DISINTEGRATING ORAL at 01:08

## 2024-08-11 RX ADMIN — POTASSIUM CHLORIDE 30 MEQ: 750 CAPSULE, EXTENDED RELEASE ORAL at 08:08

## 2024-08-11 RX ADMIN — SENNOSIDES AND DOCUSATE SODIUM 1 TABLET: 50; 8.6 TABLET ORAL at 08:08

## 2024-08-11 RX ADMIN — HEPARIN SODIUM 5000 UNITS: 5000 INJECTION INTRAVENOUS; SUBCUTANEOUS at 01:08

## 2024-08-11 RX ADMIN — HEPARIN SODIUM 5000 UNITS: 5000 INJECTION INTRAVENOUS; SUBCUTANEOUS at 06:08

## 2024-08-11 RX ADMIN — OXYCODONE AND ACETAMINOPHEN 1 TABLET: 7.5; 325 TABLET ORAL at 08:08

## 2024-08-11 RX ADMIN — CEFEPIME 1 G: 1 INJECTION, POWDER, FOR SOLUTION INTRAMUSCULAR; INTRAVENOUS at 01:08

## 2024-08-11 RX ADMIN — Medication 6 MG: at 08:08

## 2024-08-11 RX ADMIN — ONDANSETRON 8 MG: 8 TABLET, ORALLY DISINTEGRATING ORAL at 08:08

## 2024-08-11 RX ADMIN — PANTOPRAZOLE SODIUM 40 MG: 40 TABLET, DELAYED RELEASE ORAL at 08:08

## 2024-08-11 RX ADMIN — CEFEPIME 1 G: 1 INJECTION, POWDER, FOR SOLUTION INTRAMUSCULAR; INTRAVENOUS at 02:08

## 2024-08-11 RX ADMIN — SODIUM CHLORIDE, POTASSIUM CHLORIDE, SODIUM LACTATE AND CALCIUM CHLORIDE 1000 ML: 600; 310; 30; 20 INJECTION, SOLUTION INTRAVENOUS at 08:08

## 2024-08-11 NOTE — SUBJECTIVE & OBJECTIVE
Interval History:     Patient had one episode of vomiting last night. IVF given since she appears dry.   IV pain med transitioned to oral. No episodes of diarrhea yesterday.   Will DC tomorrow on oral pain/nausea meds, and oral antibiotics.     Review of Systems   Constitutional:  Positive for fatigue. Negative for chills and fever.   Respiratory:  Negative for chest tightness, shortness of breath and wheezing.    Cardiovascular:  Negative for chest pain, palpitations and leg swelling.   Gastrointestinal:  Positive for nausea and vomiting. Negative for abdominal pain.   Neurological:  Positive for weakness.     Objective:     Vital Signs (Most Recent):  Temp: 98.1 °F (36.7 °C) (08/11/24 1200)  Pulse: 75 (08/11/24 1200)  Resp: 18 (08/11/24 1200)  BP: (!) 155/73 (08/11/24 1200)  SpO2: 100 % (08/11/24 1200) Vital Signs (24h Range):  Temp:  [97.3 °F (36.3 °C)-98.7 °F (37.1 °C)] 98.1 °F (36.7 °C)  Pulse:  [63-75] 75  Resp:  [14-20] 18  SpO2:  [97 %-100 %] 100 %  BP: (101-155)/(60-79) 155/73     Weight: 87 kg (191 lb 12.8 oz)  Body mass index is 28.32 kg/m².    Intake/Output Summary (Last 24 hours) at 8/11/2024 1303  Last data filed at 8/11/2024 0823  Gross per 24 hour   Intake 2100 ml   Output 1200 ml   Net 900 ml         Physical Exam  Constitutional:       General: She is not in acute distress.  HENT:      Mouth/Throat:      Mouth: Mucous membranes are dry.   Cardiovascular:      Heart sounds: Normal heart sounds.   Pulmonary:      Effort: Pulmonary effort is normal. No respiratory distress.      Breath sounds: Normal breath sounds.   Abdominal:      General: Abdomen is flat.      Palpations: Abdomen is soft.      Tenderness: There is no abdominal tenderness.   Musculoskeletal:      Right lower leg: No edema.      Left lower leg: No edema.   Neurological:      Mental Status: She is alert.             Significant Labs: All pertinent labs within the past 24 hours have been reviewed.  BMP:   Recent Labs   Lab  08/11/24  0553   GLU 81   *   K 3.3*   *   CO2 24   BUN 16   CREATININE 1.4   CALCIUM 9.6     CBC:   Recent Labs   Lab 08/10/24  0641 08/11/24  0553   WBC 8.22 6.32   HGB 11.6* 12.4   HCT 38.4 42.1    294       Significant Imaging: I have reviewed all pertinent imaging results/findings within the past 24 hours.

## 2024-08-11 NOTE — PROGRESS NOTES
[facility] ([address] Medicine  Progress Note    Patient Name: Edita Riley  MRN: [id number]  Patient Class: IP- Inpatient   Admission Date: 8/7/2024  Length of Stay: 4 days  Attending Physician: Brittney Bolton MD  Primary Care Provider: Trina Vu MD        Subjective:     Principal Problem:ODESSA (acute kidney injury)        HPI:  Ms. Riley is a 59 YOF with PMHx of HTN, history of seizure disorder not on AEDs, Schatzki's ring /GERD, history of BLE DVTs, chronic anemia, anxiety/depression, fibromyalgia and chronic back pain, and history of pelvic radiation therapy for cervical cancer which was complicated by bilateral distal ureteral obstruction with bilateral hydronephrosis which was complicated by bowel perforation with colo-colonic anastomotic leak and peritonitis following colonic urinary conduit creation, s/p extended right colectomy with end ileostomy and left ureter-colonic conduit anastomotic stricture of ureteral implant to transverse colon conduit on 2/6/2024 with lysis of adhesions and takedown of ileostomy with current urinary ileostomy and a left percutaneous nephrostomy.    She presents to ED with abrupt onset of right flank pain several hours PTA and with complaints of epigastric abdominal pain, nausea, and vomiting for the last week. She notes generally feeling unwell with malaise/fatigue; also has complaints of intermittent light headiness, dizziness, and headaches. She denies SOB, MUNGUIA, CP, diarrhea, constipation, decreased appetite, or changes in PO intake.    In the ED HDS and afebrile. CBC with WBC 12, H/H 11.9/41, platelets 288. PT/INR 11/1.0, PTT 34. Chemistry with , K 4.2, chloride 112, CO2 18, BUN 16, SCr 2.0, glucose 103. LFTs WNL. UA 1+ protein, 1+ blood, nitrate positive, 3+ leukocytes, WBC 61, many bacteria. Urine culture in process. CT A/P severe right-sided hydronephrosis, secondary to an approximately 6 mm linear calculus just  proximal to the anastomosis. Postoperative changes of urinary diversion and left lower quadrant ileostomy. Mild left ureteral wall thickening which can be seen with pyelitis, recommend correlation with urinalysis.  Additional nonobstructing bilateral nephrolithiasis.    Urology consulted in ED with recommendations for IR consult for placement of right nephrostomy tube and no ABXs at current due to low infection concern.      The patient was admitted to the Hospital Medicine Service for further evaluation and management.     Overview/Hospital Course:  No notes on file    Interval History:     Patient had one episode of vomiting last night. IVF given since she appears dry.   IV pain med transitioned to oral. No episodes of diarrhea yesterday.   Will DC tomorrow on oral pain/nausea meds, and oral antibiotics.     Review of Systems   Constitutional:  Positive for fatigue. Negative for chills and fever.   Respiratory:  Negative for chest tightness, shortness of breath and wheezing.    Cardiovascular:  Negative for chest pain, palpitations and leg swelling.   Gastrointestinal:  Positive for nausea and vomiting. Negative for abdominal pain.   Neurological:  Positive for weakness.     Objective:     Vital Signs (Most Recent):  Temp: 98.1 °F (36.7 °C) (08/11/24 1200)  Pulse: 75 (08/11/24 1200)  Resp: 18 (08/11/24 1200)  BP: (!) 155/73 (08/11/24 1200)  SpO2: 100 % (08/11/24 1200) Vital Signs (24h Range):  Temp:  [97.3 °F (36.3 °C)-98.7 °F (37.1 °C)] 98.1 °F (36.7 °C)  Pulse:  [63-75] 75  Resp:  [14-20] 18  SpO2:  [97 %-100 %] 100 %  BP: (101-155)/(60-79) 155/73     Weight: 87 kg (191 lb 12.8 oz)  Body mass index is 28.32 kg/m².    Intake/Output Summary (Last 24 hours) at 8/11/2024 1303  Last data filed at 8/11/2024 0823  Gross per 24 hour   Intake 2100 ml   Output 1200 ml   Net 900 ml         Physical Exam  Constitutional:       General: She is not in acute distress.  HENT:      Mouth/Throat:      Mouth: Mucous membranes are  dry.   Cardiovascular:      Heart sounds: Normal heart sounds.   Pulmonary:      Effort: Pulmonary effort is normal. No respiratory distress.      Breath sounds: Normal breath sounds.   Abdominal:      General: Abdomen is flat.      Palpations: Abdomen is soft.      Tenderness: There is no abdominal tenderness.   Musculoskeletal:      Right lower leg: No edema.      Left lower leg: No edema.   Neurological:      Mental Status: She is alert.             Significant Labs: All pertinent labs within the past 24 hours have been reviewed.  BMP:   Recent Labs   Lab 08/11/24  0553   GLU 81   *   K 3.3*   *   CO2 24   BUN 16   CREATININE 1.4   CALCIUM 9.6     CBC:   Recent Labs   Lab 08/10/24  0641 08/11/24  0553   WBC 8.22 6.32   HGB 11.6* 12.4   HCT 38.4 42.1    294       Significant Imaging: I have reviewed all pertinent imaging results/findings within the past 24 hours.    Assessment/Plan:      * ODESSA (acute kidney injury)  R hydronephrosis  Nephrostomy status  ODESSA on CKD  CKD, stage III  -admitted due to R hydronephrosis with complicated urological and colorectal history detailed in HPI  -at admission HDS and afebrile  -admission workup: CBC with WBC 12, H/H 11.9/41, platelets 288. PT/INR 11/1.0, PTT 34. Chemistry with , K 4.2, chloride 112, CO2 18, BUN 16, SCr 2.0, glucose 103. LFTs WNL. UA 1+ protein, 1+ blood, nitrate positive, 3+ leukocytes, WBC 61, many bacteria. Urine culture in process. CT A/P severe right-sided hydronephrosis, secondary to an approximately 6 mm linear calculus just proximal to the anastomosis. Postoperative changes of urinary diversion and left lower quadrant ileostomy. Mild left ureteral wall thickening which can be seen with pyelitis, recommend correlation with urinalysis.  Additional nonobstructing bilateral nephrolithiasis.  -SCr at admission 2.0 >>> baseline SCr 1.2-1.5  -Urology consulted in ED with recommendations for IR consult for placement of right nephrostomy  tube   -start levofloxacin. Switched to Cefepime.   -strict I&Os  -PRN supportive care as indicated  -trend labs, address/replete electrolytes as indicated  - repeat CT abdomen - no significant changes    - IVF today      Gastro-esophageal reflux disease with esophagitis  -chronic  -no home therapies   -PRN supportive care as indicated      Anemia in chronic kidney disease  -chronic  -H/H stable at admission  -continue home iron supplement  -trend CBC, transfuse as indicated  -monitor     Essential hypertension  -chronic  -controlled  -no home antihypertensives  -address if indicated  -monitor     Fibromyalgia  -chronic  -PRN supportive care as indicated  -fall precautions      VTE Risk Mitigation (From admission, onward)           Ordered     heparin (porcine) injection 5,000 Units  Every 8 hours         08/08/24 1335     IP VTE HIGH RISK PATIENT  Once         08/07/24 2305     Place sequential compression device  Until discontinued         08/07/24 2305                    Discharge Planning   OK: 8/12/2024     Code Status: Full Code   Is the patient medically ready for discharge?:     Reason for patient still in hospital (select all that apply): Patient trending condition  Discharge Plan A: Home with family   Discharge Delays: None known at this time              Brittney Bolton MD  Department of Hospital Medicine   [facility] - [facility] ([place])

## 2024-08-11 NOTE — PLAN OF CARE
Pt sitting in chair at bedside. No c/o at present time. Emesis x1 occurrence today. Poor appetite. Reinforced current plan of care. Safety maintained.

## 2024-08-11 NOTE — NURSING
[id number] VENECIA   [facility]  [facility]1965     AGE:59 y.o.     SEX:female      STATUS:Full Code      ISOLATION:No active isolations   ALLERGIES:Bee sting [allergen ext-venom-honey bee] and Grass pollen-bermuda, standard   ADMIT DATE:  2024   PHYSICIAN:  Brittney Bolton MD   DIAGNOSIS: Acute kidney injury [N17.9]  Hydronephrosis, unspecified hydronephrosis type [N13.30] ODESSA (acute kidney injury)  CC: Abdominal Pain (Right flank pain radiating to her back with vomiting)    Past Medical History:   Diagnosis Date    Abnormal mammogram 2020    Abnormal mammogram 2020    Acute blood loss anemia     Acute deep vein thrombosis (DVT) of lower extremity 2020    Advance care planning 2021    ODESSA (acute kidney injury) 2020    Anemia due to chronic blood loss     Anemia due to chronic kidney disease     Anemia due to chronic kidney disease 2020    Anxiety     Bilateral ureteral obstruction 2020    Bilious vomiting with nausea 2023    Cardiovascular event risk -- low 2015    ASCVD 10-year risk 1.9% (with optimal risk factors 1.3%) as of 2015     Cervical cancer 2014    Chronic back pain     Colostomy care     Deep vein thrombosis     Depression     Diarrhea due to malabsorption 2018    Difficult intubation     Discharge planning issues 2021    Disorder of kidney and ureter     DVT of lower extremity, bilateral 2020    Edema 04/10/2023    Emphysema of lung 04/10/2023    Fibromyalgia     Fungemia 2020    Generalized abdominal pain 2020    GERD (gastroesophageal reflux disease)     Hemifacial spasm 2015    Hiatal hernia 2014    History of cervical cancer 10/11/2018    History of essential hypertension 2015    Not requiring medications at this time  BP is low- concern that this may correlate with increased stool output from stoma. Encourage her to contact GI and her PCP.    Hx of psychiatric care     Cymbalta,  trazodone    Hypertension     Hypomagnesemia 11/21/2018    Hyponatremia 09/10/2023    Impaired functional mobility, balance, gait, and endurance 07/24/2015    Lactose intolerance     Major depressive disorder, recurrent episode, moderate 06/02/2023    Metastatic squamous cell carcinoma to lymph node 10/11/2018    Moderate episode of recurrent major depressive disorder 04/21/2021    Nephrostomy complication 10/26/2023    Neuropathy due to chemotherapeutic drug 11/14/2018    Osteoarthritis of back     Peritonitis 09/22/2020    Physical deconditioning 04/30/2021    Pseudomonas urinary tract infection 04/21/2021    Psychiatric problem     Pyelitis 09/09/2023    QT prolongation 04/16/2021    Refusal of blood transfusions as patient is Cheondoism     Schatzki's ring 09/14/2015    Seen on outside EGD 05/2014, underwent esophageal dilatation. Bx were negative.     Seizure-like activity 12/02/2018    Seizures     Sleep stage dysfunction     Noted on PSG 06/2017; negative for obstructive sleep apnea     Stroke     Urinary tract infection associated with nephrostomy catheter 06/11/2020    Wound infection after surgery 09/24/2020     Scheduled procedure(s):   Labs to Monitor (no values):   Recent Vitals:  Temp: 97.3 °F (36.3 °C)  Pulse: 74  Resp: 20  SpO2: 98 %  BP: 132/79  Vitals:    08/10/24 1906 08/10/24 2002 08/11/24 0032 08/11/24 0431   BP: (!) 153/70  101/60 132/79   BP Location: Right arm      Patient Position: Lying      Pulse: 72  63 74   Resp: 14 16 20 20   Temp: 97.8 °F (36.6 °C)  97.5 °F (36.4 °C) 97.3 °F (36.3 °C)   TempSrc: Oral   Oral   SpO2: 100%  97% 98%   Weight:       Height:          Respiratory: WDL    Wt Readings from Last 2 Encounters:   08/09/24 87 kg (191 lb 12.8 oz)   03/15/24 83.3 kg (183 lb 12.1 oz)     GI/: Diet Adult Regular Standard Tray   Last Bowel Movement: 08/10/24    Neuro: WDL  Little catheter: No  Skin:ex:surgical incision site     Ascencion Score: 20      Lines/Drains/Airways        Drain  Duration                  Urostomy 02/06/24 1815 ureterostomy, left 186 days         Urostomy 02/25/24 1600  days         Nephrostomy 08/08/24 1547 Right 10 Fr. 2 days              Peripheral Intravenous Line  Duration                  Peripheral IV - Single Lumen 08/07/24 1644 22 G Left Antecubital 3 days                  Antibiotics (From admission, onward)      Start     Stop Route Frequency Ordered    08/10/24 1500  ceFEPIme (MAXIPIME) 1 g in D5W 100 mL IVPB (MB+)         -- IV Every 12 hours (non-standard times) 08/10/24 0742          VTE Risk Mitigation (From admission, onward)           Ordered     heparin (porcine) injection 5,000 Units  Every 8 hours         08/08/24 1335     IP VTE HIGH RISK PATIENT  Once         08/07/24 2305     Place sequential compression device  Until discontinued         08/07/24 2305                  Fall risk: standard interventions, bed alarm  Mobility: GEMS ([place] Early Mobility Scale): Level 4-Independent activities    Intake/Output Summary (Last 24 hours) at 8/11/2024 0703  Last data filed at 8/11/2024 0701  Gross per 24 hour   Intake 1175 ml   Output 1200 ml   Net -25 ml        Nursing Update/Plan of Care: Patient A&O X4, had one episode of emesis zofran IV push given, Dilaudid given once for pain that provided relief. IV patent dressing intact and dry. Care ongoing, personal item within reach and safety maintain at all time.

## 2024-08-11 NOTE — PLAN OF CARE
Pt dx ODESSA. AAOX4. No c/o at present time. Stable. Reviewed current careplan. IVF started as ordered. Callbell within reach.

## 2024-08-12 ENCOUNTER — TELEPHONE (OUTPATIENT)
Dept: UROLOGY | Facility: CLINIC | Age: 59
End: 2024-08-12
Payer: MEDICAID

## 2024-08-12 VITALS
OXYGEN SATURATION: 99 % | BODY MASS INDEX: 26.94 KG/M2 | RESPIRATION RATE: 18 BRPM | HEART RATE: 90 BPM | DIASTOLIC BLOOD PRESSURE: 68 MMHG | TEMPERATURE: 98 F | WEIGHT: 181.88 LBS | HEIGHT: 69 IN | SYSTOLIC BLOOD PRESSURE: 117 MMHG

## 2024-08-12 PROBLEM — J98.11 ATELECTASIS: Status: ACTIVE | Noted: 2024-08-12

## 2024-08-12 LAB
ANION GAP SERPL CALC-SCNC: 8 MMOL/L (ref 8–16)
ANION GAP SERPL CALC-SCNC: 9 MMOL/L (ref 8–16)
BACTERIA STL CULT: NORMAL
BASOPHILS # BLD AUTO: 0.02 K/UL (ref 0–0.2)
BASOPHILS NFR BLD: 0.3 % (ref 0–1.9)
BUN SERPL-MCNC: 15 MG/DL (ref 8–23)
BUN SERPL-MCNC: 15 MG/DL (ref 8–23)
CALCIUM SERPL-MCNC: 9.3 MG/DL (ref 8.7–10.5)
CALCIUM SERPL-MCNC: 9.4 MG/DL (ref 8.7–10.5)
CHLORIDE SERPL-SCNC: 111 MMOL/L (ref 95–110)
CHLORIDE SERPL-SCNC: 113 MMOL/L (ref 95–110)
CO2 SERPL-SCNC: 23 MMOL/L (ref 23–29)
CO2 SERPL-SCNC: 26 MMOL/L (ref 23–29)
CREAT SERPL-MCNC: 1.3 MG/DL (ref 0.5–1.4)
CREAT SERPL-MCNC: 1.4 MG/DL (ref 0.5–1.4)
DIFFERENTIAL METHOD BLD: ABNORMAL
E COLI SXT1 STL QL IA: NEGATIVE
E COLI SXT2 STL QL IA: NEGATIVE
EOSINOPHIL # BLD AUTO: 0.1 K/UL (ref 0–0.5)
EOSINOPHIL NFR BLD: 2.3 % (ref 0–8)
ERYTHROCYTE [DISTWIDTH] IN BLOOD BY AUTOMATED COUNT: 14.9 % (ref 11.5–14.5)
EST. GFR  (NO RACE VARIABLE): 42.8 ML/MIN/1.73 M^2
EST. GFR  (NO RACE VARIABLE): 46.8 ML/MIN/1.73 M^2
GLUCOSE SERPL-MCNC: 112 MG/DL (ref 70–110)
GLUCOSE SERPL-MCNC: 86 MG/DL (ref 70–110)
HCT VFR BLD AUTO: 38.3 % (ref 37–48.5)
HGB BLD-MCNC: 11.5 G/DL (ref 12–16)
IMM GRANULOCYTES # BLD AUTO: 0.02 K/UL (ref 0–0.04)
IMM GRANULOCYTES NFR BLD AUTO: 0.3 % (ref 0–0.5)
LYMPHOCYTES # BLD AUTO: 1.5 K/UL (ref 1–4.8)
LYMPHOCYTES NFR BLD: 24.4 % (ref 18–48)
MCH RBC QN AUTO: 26.9 PG (ref 27–31)
MCHC RBC AUTO-ENTMCNC: 30 G/DL (ref 32–36)
MCV RBC AUTO: 90 FL (ref 82–98)
MONOCYTES # BLD AUTO: 0.9 K/UL (ref 0.3–1)
MONOCYTES NFR BLD: 14.4 % (ref 4–15)
NEUTROPHILS # BLD AUTO: 3.6 K/UL (ref 1.8–7.7)
NEUTROPHILS NFR BLD: 58.3 % (ref 38–73)
NRBC BLD-RTO: 0 /100 WBC
PLATELET # BLD AUTO: 283 K/UL (ref 150–450)
PMV BLD AUTO: 9.9 FL (ref 9.2–12.9)
POTASSIUM SERPL-SCNC: 3.1 MMOL/L (ref 3.5–5.1)
POTASSIUM SERPL-SCNC: 3.8 MMOL/L (ref 3.5–5.1)
RBC # BLD AUTO: 4.27 M/UL (ref 4–5.4)
SODIUM SERPL-SCNC: 145 MMOL/L (ref 136–145)
SODIUM SERPL-SCNC: 145 MMOL/L (ref 136–145)
WBC # BLD AUTO: 6.19 K/UL (ref 3.9–12.7)

## 2024-08-12 PROCEDURE — 63600175 PHARM REV CODE 636 W HCPCS: Performed by: STUDENT IN AN ORGANIZED HEALTH CARE EDUCATION/TRAINING PROGRAM

## 2024-08-12 PROCEDURE — 80048 BASIC METABOLIC PNL TOTAL CA: CPT | Mod: 91 | Performed by: STUDENT IN AN ORGANIZED HEALTH CARE EDUCATION/TRAINING PROGRAM

## 2024-08-12 PROCEDURE — 25000003 PHARM REV CODE 250: Performed by: STUDENT IN AN ORGANIZED HEALTH CARE EDUCATION/TRAINING PROGRAM

## 2024-08-12 PROCEDURE — 25000003 PHARM REV CODE 250: Performed by: NURSE PRACTITIONER

## 2024-08-12 PROCEDURE — 80048 BASIC METABOLIC PNL TOTAL CA: CPT

## 2024-08-12 PROCEDURE — 99900035 HC TECH TIME PER 15 MIN (STAT)

## 2024-08-12 PROCEDURE — 36415 COLL VENOUS BLD VENIPUNCTURE: CPT | Performed by: STUDENT IN AN ORGANIZED HEALTH CARE EDUCATION/TRAINING PROGRAM

## 2024-08-12 PROCEDURE — 85025 COMPLETE CBC W/AUTO DIFF WBC: CPT

## 2024-08-12 PROCEDURE — 36415 COLL VENOUS BLD VENIPUNCTURE: CPT

## 2024-08-12 PROCEDURE — 94761 N-INVAS EAR/PLS OXIMETRY MLT: CPT

## 2024-08-12 RX ORDER — POTASSIUM CHLORIDE 750 MG/1
30 CAPSULE, EXTENDED RELEASE ORAL ONCE
Status: COMPLETED | OUTPATIENT
Start: 2024-08-12 | End: 2024-08-12

## 2024-08-12 RX ORDER — AMLODIPINE BESYLATE 5 MG/1
5 TABLET ORAL DAILY
Qty: 90 TABLET | Refills: 3 | Status: SHIPPED | OUTPATIENT
Start: 2024-08-13 | End: 2025-08-13

## 2024-08-12 RX ORDER — PANTOPRAZOLE SODIUM 40 MG/1
40 TABLET, DELAYED RELEASE ORAL DAILY
Qty: 14 TABLET | Refills: 0 | Status: SHIPPED | OUTPATIENT
Start: 2024-08-13 | End: 2024-08-27

## 2024-08-12 RX ORDER — ONDANSETRON 8 MG/1
8 TABLET, ORALLY DISINTEGRATING ORAL EVERY 12 HOURS PRN
Qty: 8 TABLET | Refills: 0 | Status: SHIPPED | OUTPATIENT
Start: 2024-08-12 | End: 2024-08-17

## 2024-08-12 RX ORDER — OXYCODONE AND ACETAMINOPHEN 5; 325 MG/1; MG/1
1 TABLET ORAL EVERY 8 HOURS PRN
Qty: 15 TABLET | Refills: 0 | Status: SHIPPED | OUTPATIENT
Start: 2024-08-12 | End: 2024-08-17

## 2024-08-12 RX ORDER — LEVOFLOXACIN 500 MG/1
500 TABLET, FILM COATED ORAL DAILY
Qty: 14 TABLET | Refills: 0 | Status: SHIPPED | OUTPATIENT
Start: 2024-08-12 | End: 2024-08-26

## 2024-08-12 RX ORDER — POTASSIUM CHLORIDE 7.45 MG/ML
10 INJECTION INTRAVENOUS
Status: COMPLETED | OUTPATIENT
Start: 2024-08-12 | End: 2024-08-12

## 2024-08-12 RX ADMIN — POTASSIUM CHLORIDE 10 MEQ: 7.46 INJECTION, SOLUTION INTRAVENOUS at 05:08

## 2024-08-12 RX ADMIN — POTASSIUM CHLORIDE 30 MEQ: 750 CAPSULE, EXTENDED RELEASE ORAL at 08:08

## 2024-08-12 RX ADMIN — CEFEPIME 1 G: 1 INJECTION, POWDER, FOR SOLUTION INTRAMUSCULAR; INTRAVENOUS at 03:08

## 2024-08-12 RX ADMIN — HEPARIN SODIUM 5000 UNITS: 5000 INJECTION INTRAVENOUS; SUBCUTANEOUS at 05:08

## 2024-08-12 RX ADMIN — PANTOPRAZOLE SODIUM 40 MG: 40 TABLET, DELAYED RELEASE ORAL at 08:08

## 2024-08-12 RX ADMIN — HEPARIN SODIUM 5000 UNITS: 5000 INJECTION INTRAVENOUS; SUBCUTANEOUS at 02:08

## 2024-08-12 RX ADMIN — SENNOSIDES AND DOCUSATE SODIUM 1 TABLET: 50; 8.6 TABLET ORAL at 08:08

## 2024-08-12 RX ADMIN — AMLODIPINE BESYLATE 5 MG: 5 TABLET ORAL at 08:08

## 2024-08-12 RX ADMIN — POTASSIUM CHLORIDE 10 MEQ: 7.46 INJECTION, SOLUTION INTRAVENOUS at 06:08

## 2024-08-12 RX ADMIN — POTASSIUM CHLORIDE 10 MEQ: 7.46 INJECTION, SOLUTION INTRAVENOUS at 08:08

## 2024-08-12 NOTE — CARE UPDATE
I have reviewed the chart of Edita Hardin [facility] who is hospitalized for the following:    Active Hospital Problems    Diagnosis    *ODESSA (acute kidney injury)    Atelectasis    Emphysema of lung    CKD (chronic kidney disease), stage III    Nephrostomy status    Hypokalemia    Ileostomy in place    Hydronephrosis, right    Anemia in chronic kidney disease    Gastro-esophageal reflux disease with esophagitis    Fibromyalgia    Essential hypertension        BRANDON ELENA, TONI  Unit Based VAISHNAVI

## 2024-08-12 NOTE — PLAN OF CARE
Problem: Adult Inpatient Plan of Care  Goal: Plan of Care Review  Outcome: Progressing  Goal: Patient-Specific Goal (Individualized)  Outcome: Progressing  Goal: Absence of Hospital-Acquired Illness or Injury  Outcome: Progressing  Goal: Optimal Comfort and Wellbeing  Outcome: Progressing  Intervention: Provide Person-Centered Care  Flowsheets (Taken 8/12/2024 0409)  Trust Relationship/Rapport:   care explained   empathic listening provided   emotional support provided   questions answered  Goal: Readiness for Transition of Care  Outcome: Progressing     Problem: Wound  Goal: Optimal Coping  Outcome: Progressing  Intervention: Support Patient and Family Response  Flowsheets (Taken 8/12/2024 0409)  Supportive Measures: active listening utilized  Goal: Optimal Functional Ability  Outcome: Progressing  Goal: Absence of Infection Signs and Symptoms  Outcome: Progressing  Goal: Improved Oral Intake  Outcome: Progressing  Goal: Optimal Pain Control and Function  Outcome: Progressing  Intervention: Prevent or Manage Pain  Flowsheets (Taken 8/12/2024 0409)  Sleep/Rest Enhancement: awakenings minimized  Pain Management Interventions:   care clustered   medication offered  Goal: Skin Health and Integrity  Outcome: Progressing  Goal: Optimal Wound Healing  Outcome: Progressing  Intervention: Promote Wound Healing  Flowsheets (Taken 8/12/2024 0409)  Sleep/Rest Enhancement: awakenings minimized     Problem: Skin Injury Risk Increased  Goal: Skin Health and Integrity  Outcome: Progressing  Intervention: Optimize Skin Protection  Flowsheets (Taken 8/12/2024 0409)  Pressure Reduction Techniques: frequent weight shift encouraged  Skin Protection: incontinence pads utilized  Activity Management: Rolling - L1  Head of Bed (HOB) Positioning: HOB at 20 degrees

## 2024-08-12 NOTE — PLAN OF CARE
[facility] - [facility] ([place]  Discharge Final Note    Primary Care Provider: Trina Vu MD    Expected Discharge Date: 8/12/2024  Discharge Plan A and Plan B have been determined by review of patient's clinical status, future medical and therapeutic needs, and coverage/benefits for post-acute care in coordination with multidisciplinary team members.     Final Discharge Note (most recent)       Final Note - 08/12/24 1626          Final Note    Assessment Type Final Discharge Note     Anticipated Discharge Disposition Planned Readmission - Home or self care     What phone number can be called within the next 1-3 days to see how you are doing after discharge? [phone]   Hammad Riley (Spouse)    Hospital Resources/Appts/Education Provided Appointments scheduled and added to AVS        Post-Acute Status    Post-Acute Authorization Other     Coverage MEDICAID - [facility] ([facility]) -     Other Status No Post-Acute Service Needs     Discharge Delays None known at this time                   Future Appointments   Date Time Provider Department Center   8/23/2024  8:00 AM Leslie Balbuena MD [facility] UROLOGY [facility] CM met with patient and spoke with spouse via phone and discussed discharge plans, patient to [place] home with [place]. Patients medications delivered to bedside. No  HME/DME  recommended  and follow up appointment[s] scheduled.   Transportation provided by family [spouse].    Kiki Peterson RN  Case Management  [facility]  [phone]

## 2024-08-12 NOTE — NURSING
Pt discharged.IV removed. Discharge instructions reviewed with patient prior to leaving. Pt gave verbal understanding. Meds delivered per bedside delivery. All belongings gathered.  transported patient home. Nad present

## 2024-08-12 NOTE — PLAN OF CARE
Discharge Plan A and Plan B have been determined by review of patient's clinical status, future medical and therapeutic needs, and coverage/benefits for post-acute care in coordination with multidisciplinary team members.        08/12/24 0855   Discharge Reassessment   Assessment Type Discharge Planning Reassessment   Did the patient's condition or plan change since previous assessment? No   Discharge Plan discussed with: Patient;Spouse/sig other   Name(s) and Number(s) Hammad Riley (Spouse)  [phone] (Mobile)   Communicated OK with patient/caregiver Yes   Discharge Plan A Home with family   Discharge Plan B Home   DME Needed Upon Discharge  other (see comments)  (TBD)   Transition of Care Barriers None   Why the patient remains in the hospital Requires continued medical care   Post-Acute Status   Post-Acute Authorization Other   Coverage MEDICAID - [facility] ([facility]) -   Other Status No Post-Acute Service Needs   Hospital Resources/Appts/Education Provided Appointments scheduled and added to AVS   Discharge Delays None known at this time     CM met with patient and spouse via phone  to discuss discharge planning.  Patients plan is to   return homew with her spouse. Patients spouse wishes for his nurse to call him, when she is ready for discharge home. The spouse will transport home.   OK:  8/12/24             Kiki Peterson RN  Case Management  [facility]  [phone]

## 2024-08-12 NOTE — NURSING
End of Shift Summary:  Patient complained of pain at the beginning of the shift.  Wanted to have Dilaudid, but explained that Dilaudid was discontinued and she had oral pain medication.  Patient was agreeable to oral pain medication.  Urostomy bag was replaced with her home bags, which fit better.  Nephrostomy tube was clearing and now draining clear yellow urine, however patient does have multiple kidney stones, so there is concern that stent could be occluded from a kidney stone.  Patient's potassium was low in the morning, so IV potassium was ordered to replace potassium  Patient was weigh and weight trended down 10 pounds since 3 days ago.  No complaints of pain in the morning.  Patient slept through most of the night.

## 2024-08-12 NOTE — TELEPHONE ENCOUNTER
Pt currently admitted. Scheduled for first available appt at 8:00am on Fri, 8/23/2024 with Dr. Balbuena for discussion and planning of procedure. Letter mailed to pt's home, as well.

## 2024-08-12 NOTE — DISCHARGE INSTRUCTIONS
Please take antibiotics til your urology procedure.     Please take pain med and anti-nausea medication as needed. Use tylenol for pain first.    Please follow up with your PCP, pain management, urology, and oncology.       Return to ED if you develop worsening weakness, nausea, vomiting, abdominal pain, shortness of breath.

## 2024-08-12 NOTE — TELEPHONE ENCOUNTER
----- Message from Mayrann Darnell MD sent at 8/9/2024  6:44 AM CDT -----  Hi,     This patient needs f/u with Dr. Balbuena in 1 week to discuss planning for right antegrade ureteroscopy.     Thanks,   Maryann

## 2024-08-26 ENCOUNTER — TELEPHONE (OUTPATIENT)
Dept: UROLOGY | Facility: CLINIC | Age: 59
End: 2024-08-26
Payer: MEDICAID

## 2024-08-26 NOTE — TELEPHONE ENCOUNTER
----- Message from Jacquelin Howard sent at 8/26/2024  1:12 PM CDT -----  Regarding: call back  Contact: [phone]  Pt calling in requesting call back please call to discuss Further

## 2024-09-06 ENCOUNTER — OFFICE VISIT (OUTPATIENT)
Dept: UROLOGY | Facility: CLINIC | Age: 59
End: 2024-09-06
Payer: MEDICAID

## 2024-09-06 ENCOUNTER — HOSPITAL ENCOUNTER (OUTPATIENT)
Dept: RADIOLOGY | Facility: HOSPITAL | Age: 59
Discharge: HOME | End: 2024-09-06
Attending: UROLOGY
Payer: MEDICAID

## 2024-09-06 VITALS — HEIGHT: 69 IN | WEIGHT: 189.13 LBS | BODY MASS INDEX: 28.01 KG/M2

## 2024-09-06 DIAGNOSIS — N20.1 RIGHT URETERAL CALCULUS: ICD-10-CM

## 2024-09-06 DIAGNOSIS — Z93.6 NEPHROSTOMY STATUS: Primary | ICD-10-CM

## 2024-09-06 PROCEDURE — 74176 CT ABD & PELVIS W/O CONTRAST: CPT | Mod: 26,,, | Performed by: RADIOLOGY

## 2024-09-06 PROCEDURE — 74176 CT ABD & PELVIS W/O CONTRAST: CPT | Mod: TC

## 2024-09-06 PROCEDURE — 99213 OFFICE O/P EST LOW 20 MIN: CPT | Mod: PBBFAC,25 | Performed by: UROLOGY

## 2024-09-06 PROCEDURE — 87086 URINE CULTURE/COLONY COUNT: CPT | Performed by: UROLOGY

## 2024-09-06 PROCEDURE — 99999 PR PBB SHADOW E&M-EST. PATIENT-LVL III: CPT | Mod: PBBFAC,,, | Performed by: UROLOGY

## 2024-09-06 NOTE — PROGRESS NOTES
CHIEF COMPLAINT:    Mrs. Riley is a 59 y.o. 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      PRESENTING ILLNESS:    Edita Riley is a 59 y.o. female who presents after being hospitalized on 8/7/2024 with right hydronephrosis, there was a possible stone at the ureteral colonic junction.  A percutaneous nephrostomy was placed as the patient had a positive urinalysis.  She was placed on antibiotics.  She returns today with a percutaneous nephrostomy tube in place.  The patient appeared to have ureteritis and pyelonephritis on the left side.  She does not have any fevers.      55 y.o. female who has a history of cervical cancer status post XRT, was hospitalized in March and was found to have right sided hydronephrosis.  She underwent bladder biopsy and fulguration and because of the XRT, went ahead and placed a left ureteral stents under the same anesthetic.  She later developed renal failure and ended up at [facility] where she had percutaneous nephrostomy tubes placed.      The patient is status post colon conduit 9/11/2020.  That was complicated by colon perforation and she had a diverting colostomy.  She had a left ureteral conduit scarring and this was repaired on 2/6/2024 and the colostomy was reversed at the same time.    REVIEW OF SYSTEMS:    Review of Systems   Constitutional: Negative.    HENT: Negative.     Eyes: Negative.    Respiratory: Negative.     Cardiovascular: Negative.    Gastrointestinal: Negative.    Genitourinary:  Positive for flank pain.   Skin: Negative.    Neurological: Negative.    Endo/Heme/Allergies: Negative.    Psychiatric/Behavioral: Negative.         PATIENT HISTORY:    Past Medical History:   Diagnosis Date    Abnormal mammogram 08/25/2020    Abnormal mammogram 08/25/2020    Acute blood loss anemia     Acute deep vein thrombosis (DVT) of lower extremity  12/09/2020    Advance care planning 04/30/2021    ODESSA (acute kidney injury) 03/21/2020    Anemia due to chronic blood loss     Anemia due to chronic kidney disease     Anemia due to chronic kidney disease 05/18/2020    Anxiety     Bilateral ureteral obstruction 09/11/2020    Bilious vomiting with nausea 06/11/2023    Cardiovascular event risk -- low 09/14/2015    ASCVD 10-year risk 1.9% (with optimal risk factors 1.3%) as of 9/14/2015     Cervical cancer 2014    Chronic back pain     Colostomy care     Deep vein thrombosis     Depression     Diarrhea due to malabsorption 11/14/2018    Difficult intubation     Discharge planning issues 04/30/2021    Disorder of kidney and ureter     DVT of lower extremity, bilateral 11/04/2020    Edema 04/10/2023    Emphysema of lung 04/10/2023    Fibromyalgia     Fungemia 09/27/2020    Generalized abdominal pain 08/25/2020    GERD (gastroesophageal reflux disease)     Hemifacial spasm 09/16/2015    Hiatal hernia 2014    History of cervical cancer 10/11/2018    History of essential hypertension 05/04/2015    Not requiring medications at this time  BP is low- concern that this may correlate with increased stool output from stoma. Encourage her to contact GI and her PCP.    Hx of psychiatric care     Cymbalta, trazodone    Hypertension     Hypomagnesemia 11/21/2018    Hyponatremia 09/10/2023    Impaired functional mobility, balance, gait, and endurance 07/24/2015    Lactose intolerance     Major depressive disorder, recurrent episode, moderate 06/02/2023    Metastatic squamous cell carcinoma to lymph node 10/11/2018    Moderate episode of recurrent major depressive disorder 04/21/2021    Nephrostomy complication 10/26/2023    Neuropathy due to chemotherapeutic drug 11/14/2018    Osteoarthritis of back     Peritonitis 09/22/2020    Physical deconditioning 04/30/2021    Pseudomonas urinary tract infection 04/21/2021    Psychiatric problem     Pyelitis 09/09/2023    QT prolongation  04/16/2021    Refusal of blood transfusions as patient is [employer]orationism     Schatzki's ring 09/14/2015    Seen on outside EGD 05/2014, underwent esophageal dilatation. Bx were negative.     Seizure-like activity 12/02/2018    Seizures     Sleep stage dysfunction     Noted on PSG 06/2017; negative for obstructive sleep apnea     Stroke     Urinary tract infection associated with nephrostomy catheter 06/11/2020    Wound infection after surgery 09/24/2020       Past Surgical History:   Procedure Laterality Date    ANASTOMOSIS OF INTESTINE N/A 2/6/2024    Procedure: ANASTOMOSIS, INTESTINE - Ileocolic anastomosis;  Surgeon: Hammad Reynolds MD;  Location: [facility] OR 2ND FLR;  Service: Colon and Rectal;  Laterality: N/A;    ANTEGRADE NEPHROSTOGRAPHY Bilateral 9/28/2020    Procedure: Nephrostogram - antegrade;  Surgeon: Leslie Balbuena MD;  Location: [facility] OR 1ST FLR;  Service: Urology;  Laterality: Bilateral;    ANTEGRADE NEPHROSTOGRAPHY Left 4/20/2021    Procedure: NEPHROSTOGRAM, ANTEGRADE;  Surgeon: Leslie Balbuena MD;  Location: [facility] OR 1ST FLR;  Service: Urology;  Laterality: Left;    ANTEGRADE NEPHROSTOGRAPHY Right 10/27/2022    Procedure: NEPHROSTOGRAM, ANTEGRADE;  Surgeon: Chirag Russ MD;  Location: [facility] OR [facility] FLR;  Service: Urology;  Laterality: Right;    ANTEGRADE NEPHROSTOGRAPHY Right 4/21/2023    Procedure: NEPHROSTOGRAM, ANTEGRADE;  Surgeon: Chirag Russ MD;  Location: [facility] OR 2ND FLR;  Service: Urology;  Laterality: Right;    ANTEGRADE NEPHROSTOGRAPHY Left 6/6/2023    Procedure: Nephrostogram - antegrade;  Surgeon: Leslie Balbuena MD;  Location: [facility] OR 1ST FLR;  Service: Urology;  Laterality: Left;    BILATERAL OOPHORECTOMY  2015    CHOLECYSTECTOMY  11/09/2016    Done at [facility], path showed chronic cholecystitis and gallstones    cold knife conization  2014    COLECTOMY, RIGHT  9/17/2020    Procedure: COLECTOMY, RIGHT Extended;  Surgeon: Hammad Reynolds MD;  [facility];   Service: Colon and Rectal;;    COLONOSCOPY  2014    [facility]    COLONOSCOPY N/A 5/18/2018    Procedure: COLONOSCOPY;  Surgeon: Arden Gutiérrez MD;  Location: [facility] (4TH FLR);  Service: Endoscopy;  Laterality: N/A;    COLONOSCOPY N/A 7/28/2020    Procedure: COLONOSCOPY;  Surgeon: Hammad Reynolds MD;  Location: [facility] (4TH FLR);  Service: Colon and Rectal;  Laterality: N/A;  covid test [place] 7/25    CYSTOSCOPY WITH URETEROSCOPY, RETROGRADE PYELOGRAPHY, AND INSERTION OF STENT Bilateral 3/21/2020    Procedure: CYSTOSCOPY, WITH RETROGRADE PYELOGRAM,;  Surgeon: Leslie Balbuena MD;  Location: [facility] OR 1ST FLR;  Service: Urology;  Laterality: Bilateral;    DILATION OF NEPHROSTOMY TRACT Right 10/27/2022    Procedure: DILATION, NEPHROSTOMY TRACT;  Surgeon: Chirag Russ MD;  Location: [facility] OR 1ST FLR;  Service: Urology;  Laterality: Right;    ESOPHAGOGASTRODUODENOSCOPY  2014    ESOPHAGOGASTRODUODENOSCOPY  11/18/2020    ESOPHAGOGASTRODUODENOSCOPY N/A 11/18/2020    Procedure: ESOPHAGOGASTRODUODENOSCOPY (EGD);  Surgeon: Zenon Spencer MD;  Location: [facility];  Service: Endoscopy;  Laterality: N/A;    ESOPHAGOGASTRODUODENOSCOPY N/A 12/11/2020    Procedure: EGD (ESOPHAGOGASTRODUODENOSCOPY);  Surgeon: Juancho Muse MD;  Location: [facility] (2ND FLR);  Service: Endoscopy;  Laterality: N/A;    EXCISION, SMALL INTESTINE  2/6/2024    Procedure: EXCISION, SMALL INTESTINE;  Surgeon: Hammad Reynolds MD;  Location: [facility] OR 2ND FLR;  Service: Colon and Rectal;;    HYSTERECTOMY  2014    [facility] for cervical cancer    ILEOSTOMY  9/17/2020    Procedure: CREATION, ILEOSTOMY;  Surgeon: Hammad Reynolds MD;  Location: [facility] OR 2ND FLR;  Service: Colon and Rectal;;    ILEOSTOMY CLOSURE N/A 2/6/2024    Procedure: CLOSURE, ILEOSTOMY;  Surgeon: Hammad Reynolds MD;  Location: [facility] OR 2ND FLR;  Service: Colon and Rectal;  Laterality: N/A;    LAPAROTOMY, EXPLORATORY N/A 2/6/2024     Procedure: OPEN LAPAROTOMY, EXPLORATORY;  Surgeon: Leslie Balbuena MD;  Location: [facility] OR 2ND FLR;  Service: Urology;  Laterality: N/A;  22 modifier    LOOPOGRAM N/A 4/20/2021    Procedure: LOOPOGRAM;  Surgeon: Leslie Balbuena MD;  Location: [facility] OR 1ST FLR;  Service: Urology;  Laterality: N/A;    LOOPOGRAM N/A 6/6/2023    Procedure: LOOPOGRAM;  Surgeon: Leslie Balbuena MD;  Location: [facility] OR 1ST FLR;  Service: Urology;  Laterality: N/A;    LYSIS OF ADHESIONS  2/6/2024    Procedure: LYSIS, ADHESIONS;  Surgeon: Leslie Balbuena MD;  Location: [facility] OR 2ND FLR;  Service: Urology;;    LYSIS OF ADHESIONS  2/6/2024    Procedure: LYSIS, ADHESIONS;  Surgeon: Hammad Reynolds MD;  Location: [facility] OR [facility] FLR;  Service: Colon and Rectal;;    MOBILIZATION OF SPLENIC FLEXURE N/A 9/11/2020    Procedure: MOBILIZATION, COLONIC;  Surgeon: Hammad Reynolds MD;  Location: [facility] OR [facility] FLR;  Service: Colon and Rectal;  Laterality: N/A;    NEPHROSTOGRAPHY Bilateral 4/17/2021    Procedure: Nephrostogram;  Surgeon: Celeste Surgeon;  Location: [facility];  Service: Anesthesiology;  Laterality: Bilateral;    OOPHORECTOMY      PERCUTANEOUS NEPHROLITHOTOMY Right 4/21/2023    Procedure: NEPHROLITHOTOMY, PERCUTANEOUS;  Surgeon: Chirag Russ MD;  [facility];  Service: Urology;  Laterality: Right;  2.5 hrs    PERCUTANEOUS NEPHROSTOMY  4/21/2023    Procedure: CREATION, NEPHROSTOMY, PERCUTANEOUS with removal of existing nephrostomy tube;  Surgeon: Chirag Russ MD;  [facility];  Service: Urology;;    PYELOSCOPY Right 10/27/2022    Procedure: PYELOSCOPY;  Surgeon: Chirag Russ MD;  [facility];  Service: Urology;  Laterality: Right;    REPLACEMENT OF NEPHROSTOMY TUBE Right 10/27/2022    Procedure: REPLACEMENT, NEPHROSTOMY TUBE;  Surgeon: Chirag Russ MD;  Location: [facility] OR 1ST FLR;  Service: Urology;  Laterality: Right;    RETROPERITONEAL LYMPHADENECTOMY Right 9/17/2018    Procedure:  LYMPHADENECTOMY, RETROPERITONEUM;  Surgeon: Sebas Reed MD;  Location: [facility] OR 2ND FLR;  Service: General;  Laterality: Right;  ILIAC    REVISION OF ANASTOMOSIS OF URETER TO ILEAL CONDUIT N/A 2024    Procedure: REVISION, ANASTOMOSIS, URETEROILEAL;  Surgeon: Leslie Balbuena MD;  Location: [facility] OR 2ND FLR;  Service: Urology;  Laterality: N/A;    URETEROSCOPIC REMOVAL OF URETERIC CALCULUS Right 10/27/2022    Procedure: REMOVAL, CALCULUS, URETER, URETEROSCOPIC;  Surgeon: Chirag Russ MD;  Location: [facility] OR 1ST FLR;  Service: Urology;  Laterality: Right;    URETEROSCOPY Right 10/27/2022    Procedure: URETEROSCOPY-ANTEGRADE;  Surgeon: Chirag Russ MD;  Location: [facility] OR 1ST FLR;  Service: Urology;  Laterality: Right;       Family History   Problem Relation Name Age of Onset    Heart disease Sister      Heart disease Maternal Grandmother      Colon cancer Father      Esophageal cancer Father      Cancer Father          Lung-smoker    Cancer Mother          Cervical    Cervical cancer Mother      Breast cancer Maternal Aunt      Suicide Daughter Gayla         jumped from parking structure    Drug abuse Daughter Arquel     Drug abuse Daughter Arquet      Social History     Socioeconomic History    Marital status:      Spouse name: Hammad    Number of children: 2   Tobacco Use    Smoking status: Never    Smokeless tobacco: Never   Substance and Sexual Activity    Alcohol use: No     Alcohol/week: 0.0 standard drinks of alcohol    Drug use: No    Sexual activity: Yes     Partners: Male     Birth control/protection: None     Comment:  19 years since    Social History Narrative    , twin daughters (1  2018), disabled due to childhood stroke, [employer] sophia     Social Determinants of Health     Financial Resource Strain: Low Risk  (2024)    Overall Financial Resource Strain (CARDIA)     Difficulty of Paying Living Expenses: Not hard at all   Food  Insecurity: No Food Insecurity (2024)    Hunger Vital Sign     Worried About Running Out of Food in the Last Year: Never true     Ran Out of Food in the Last Year: Never true   Transportation Needs: No Transportation Needs (2024)    TRANSPORTATION NEEDS     Transportation : No   Physical Activity: Sufficiently Active (2024)    Exercise Vital Sign     Days of Exercise per Week: 7 days     Minutes of Exercise per Session: 30 min   Stress: No Stress Concern Present (2024)    [employer] [place] [employer] - Occupational Stress Questionnaire     Feeling of Stress : Not at all   Housing Stability: Low Risk  (2024)    Housing Stability Vital Sign     Unable to Pay for Housing in the Last Year: No     Homeless in the Last Year: No       Allergies:  Bee sting [allergen ext-venom-honey bee] and Grass pollen-bermuda, standard    Medications:  Outpatient Encounter Medications as of 2024   Medication Sig Dispense Refill    acetaminophen (TYLENOL) 500 MG tablet Take 1 tablet (500 mg total) by mouth every 6 (six) hours as needed for Pain. 20 tablet 0    amLODIPine (NORVASC) 5 MG tablet Take 1 tablet (5 mg total) by mouth once daily. 90 tablet 3    ferrous sulfate (FEOSOL) 325 mg (65 mg iron) Tab tablet TAKE 1 TABLET BY MOUTH TWICE A DAY 60 tablet 2    loratadine (CLARITIN) 10 mg tablet Take 10 mg by mouth daily as needed for Allergies.      mirtazapine (REMERON) 30 MG tablet Take 1 tablet (30 mg total) by mouth nightly. 30 tablet 11    [] levoFLOXacin (LEVAQUIN) 500 MG tablet Take 1 tablet (500 mg total) by mouth once daily. for 14 days 14 tablet 0    pantoprazole (PROTONIX) 40 MG tablet Take 1 tablet (40 mg total) by mouth once daily. for 14 days 14 tablet 0     No facility-administered encounter medications on file as of 2024.         PHYSICAL EXAMINATION:    The patient generally appears in good health, is appropriately interactive, and is in no apparent distress.    Skin: No  lesions.    Mental: Cooperative with normal affect.    Neuro: Grossly intact.    HEENT: Normal. No evidence of lymphadenopathy.    Chest:  normal inspiratory effort.    Abdomen: Soft, non-tender. No masses or organomegaly. Bladder is not palpable. No evidence of flank discomfort. No evidence of inguinal hernia.    Extremities: No clubbing, cyanosis, or edema    Back:  the nephrostomy is draining clear urine    LABS:    Lab Results   Component Value Date    BUN 9 09/06/2024    CREATININE 1.1 09/06/2024     CT scan from today showed improvement on the left side.  Right side is decompressed.    There is a non obstructing stone on the left side.   IMPRESSION:    Right ureteral obstruction status post right nephrostomy tube placement    PLAN:    1. BMP was done today as well as the CT scan and she came back to clinic  2. Consented for right antegrade nephrostogram, possible antegrade ureteroscopy, stone treatment, stent placement and loopogram to check for reflux.   3.  Urine from the right nephrostomy tube was sent after barbotage with sterile saline for injection.     I spent 40 minutes with the patient of which more than half was spent in direct consultation with the patient in regards to our treatment and plan.

## 2024-09-07 LAB — BACTERIA UR CULT: ABNORMAL

## 2024-09-16 NOTE — PT/OT/SLP PROGRESS
Occupational Therapy      Patient Name:  Edita Hardin [facility]   MRN:  [id number]    Patient not seen today secondary to pt off floor for procedure. Will follow-up 9/29/2020.    Danielle Funes OT  9/28/2020   Routine safety standards initiated.  Routine nursing standards initiated.

## 2024-09-19 NOTE — PLAN OF CARE
Sw placed call to spouse  Hammad to inform Pt is ok to dc and Pt needs transport home, left VM with spouse, will follow.   Tried to contact patient with [facility] . No answer and voicemail is full.     Will try again later and also send mychart.     Danielle JOYCE CMA on September 19, 2024 at 12:56 PM

## 2024-09-23 ENCOUNTER — OFFICE VISIT (OUTPATIENT)
Dept: PRIMARY CARE CLINIC | Facility: CLINIC | Age: 59
End: 2024-09-23
Payer: MEDICAID

## 2024-09-23 VITALS
WEIGHT: 189.06 LBS | OXYGEN SATURATION: 98 % | SYSTOLIC BLOOD PRESSURE: 96 MMHG | TEMPERATURE: 98 F | HEIGHT: 69 IN | DIASTOLIC BLOOD PRESSURE: 64 MMHG | BODY MASS INDEX: 28 KG/M2 | HEART RATE: 65 BPM

## 2024-09-23 DIAGNOSIS — E66.3 OVERWEIGHT WITH BODY MASS INDEX (BMI) OF 27 TO 27.9 IN ADULT: ICD-10-CM

## 2024-09-23 DIAGNOSIS — Z75.8 DOES NOT HAVE PRIMARY CARE PROVIDER: ICD-10-CM

## 2024-09-23 DIAGNOSIS — Z93.6 PRESENCE OF UROSTOMY: ICD-10-CM

## 2024-09-23 DIAGNOSIS — Z00.00 ENCOUNTER FOR WELLNESS EXAMINATION IN ADULT: Primary | ICD-10-CM

## 2024-09-23 DIAGNOSIS — Z01.00 ROUTINE EYE EXAM: ICD-10-CM

## 2024-09-23 DIAGNOSIS — Z93.6 NEPHROSTOMY STATUS: ICD-10-CM

## 2024-09-23 DIAGNOSIS — I10 ESSENTIAL HYPERTENSION: ICD-10-CM

## 2024-09-23 DIAGNOSIS — Z13.220 SCREENING CHOLESTEROL LEVEL: ICD-10-CM

## 2024-09-23 DIAGNOSIS — K21.00 GASTROESOPHAGEAL REFLUX DISEASE WITH ESOPHAGITIS, UNSPECIFIED WHETHER HEMORRHAGE: ICD-10-CM

## 2024-09-23 DIAGNOSIS — Z12.31 ENCOUNTER FOR SCREENING MAMMOGRAM FOR BREAST CANCER: ICD-10-CM

## 2024-09-23 DIAGNOSIS — N18.31 STAGE 3A CHRONIC KIDNEY DISEASE: ICD-10-CM

## 2024-09-23 DIAGNOSIS — D64.9 ANEMIA, UNSPECIFIED TYPE: ICD-10-CM

## 2024-09-23 DIAGNOSIS — N12 PYELONEPHRITIS: ICD-10-CM

## 2024-09-23 DIAGNOSIS — G43.009 MIGRAINE WITHOUT AURA AND WITHOUT STATUS MIGRAINOSUS, NOT INTRACTABLE: ICD-10-CM

## 2024-09-23 DIAGNOSIS — Z13.1 SCREENING FOR DIABETES MELLITUS: ICD-10-CM

## 2024-09-23 LAB
ALBUMIN/CREAT UR: 254.2 UG/MG (ref 0–30)
CREAT UR-MCNC: 96 MG/DL (ref 15–325)
MICROALBUMIN UR DL<=1MG/L-MCNC: 244 UG/ML

## 2024-09-23 PROCEDURE — 99396 PREV VISIT EST AGE 40-64: CPT | Mod: S$PBB,,, | Performed by: NURSE PRACTITIONER

## 2024-09-23 PROCEDURE — 1159F MED LIST DOCD IN RCRD: CPT | Mod: CPTII,,, | Performed by: NURSE PRACTITIONER

## 2024-09-23 PROCEDURE — 99215 OFFICE O/P EST HI 40 MIN: CPT | Mod: PBBFAC,PN | Performed by: NURSE PRACTITIONER

## 2024-09-23 PROCEDURE — 99215 OFFICE O/P EST HI 40 MIN: CPT | Mod: S$PBB,25,, | Performed by: NURSE PRACTITIONER

## 2024-09-23 PROCEDURE — 3074F SYST BP LT 130 MM HG: CPT | Mod: CPTII,,, | Performed by: NURSE PRACTITIONER

## 2024-09-23 PROCEDURE — 1160F RVW MEDS BY RX/DR IN RCRD: CPT | Mod: CPTII,,, | Performed by: NURSE PRACTITIONER

## 2024-09-23 PROCEDURE — 3008F BODY MASS INDEX DOCD: CPT | Mod: CPTII,,, | Performed by: NURSE PRACTITIONER

## 2024-09-23 PROCEDURE — 3078F DIAST BP <80 MM HG: CPT | Mod: CPTII,,, | Performed by: NURSE PRACTITIONER

## 2024-09-23 PROCEDURE — 82570 ASSAY OF URINE CREATININE: CPT | Performed by: NURSE PRACTITIONER

## 2024-09-23 PROCEDURE — 99999 PR PBB SHADOW E&M-EST. PATIENT-LVL V: CPT | Mod: PBBFAC,,, | Performed by: NURSE PRACTITIONER

## 2024-09-23 NOTE — PROGRESS NOTES
Subjective:       Patient ID: Edita Riley is a 59 y.o. female.    Chief Complaint: Establish Care (/)    Ms. Edita Riley is a 59 year old female, new to me, presents to the clinic for wellness examination an establish care.  No PCP. Medical and surgical history in addition to problem list reviewed as listed below.     Gyn/Onc 09/2023    Last eye examination-many years ago    Nonspecific diet, does not exercise.      Reports that she is scheduled for surgery tomorrow with Nephrology.      Past Medical History:   Diagnosis Date    Abnormal mammogram 08/25/2020    Abnormal mammogram 08/25/2020    Acute blood loss anemia     Acute deep vein thrombosis (DVT) of lower extremity 12/09/2020    Advance care planning 04/30/2021    ODESSA (acute kidney injury) 03/21/2020    Anemia due to chronic blood loss     Anemia due to chronic kidney disease     Anemia due to chronic kidney disease 05/18/2020    Anxiety     Bilateral ureteral obstruction 09/11/2020    Bilious vomiting with nausea 06/11/2023    Cardiovascular event risk -- low 09/14/2015    ASCVD 10-year risk 1.9% (with optimal risk factors 1.3%) as of 9/14/2015     Cervical cancer 2014    Chronic back pain     Colostomy care     Deep vein thrombosis     Depression     Diarrhea due to malabsorption 11/14/2018    Difficult intubation     Discharge planning issues 04/30/2021    Disorder of kidney and ureter     DVT of lower extremity, bilateral 11/04/2020    Edema 04/10/2023    Emphysema of lung 04/10/2023    Fibromyalgia     Fungemia 09/27/2020    Generalized abdominal pain 08/25/2020    GERD (gastroesophageal reflux disease)     Hemifacial spasm 09/16/2015    Hiatal hernia 2014    History of cervical cancer 10/11/2018    History of essential hypertension 05/04/2015    Not requiring medications at this time  BP is low- concern that this may correlate with increased stool output from stoma. Encourage her to contact GI and her PCP.    Hx of psychiatric  care     Cymbalta, trazodone    Hypertension     Hypomagnesemia 11/21/2018    Hyponatremia 09/10/2023    Impaired functional mobility, balance, gait, and endurance 07/24/2015    Lactose intolerance     Major depressive disorder, recurrent episode, moderate 06/02/2023    Metastatic squamous cell carcinoma to lymph node 10/11/2018    Moderate episode of recurrent major depressive disorder 04/21/2021    Nephrostomy complication 10/26/2023    Neuropathy due to chemotherapeutic drug 11/14/2018    Osteoarthritis of back     Peritonitis 09/22/2020    Physical deconditioning 04/30/2021    Pseudomonas urinary tract infection 04/21/2021    Psychiatric problem     Pyelitis 09/09/2023    QT prolongation 04/16/2021    Refusal of blood transfusions as patient is [employer]     Schatzki's ring 09/14/2015    Seen on outside EGD 05/2014, underwent esophageal dilatation. Bx were negative.     Seizure-like activity 12/02/2018    Seizures     Sleep stage dysfunction     Noted on PSG 06/2017; negative for obstructive sleep apnea     Stroke     Urinary tract infection associated with nephrostomy catheter 06/11/2020    Wound infection after surgery 09/24/2020        Past Surgical History:   Procedure Laterality Date    ANASTOMOSIS OF INTESTINE N/A 2/6/2024    Procedure: ANASTOMOSIS, INTESTINE - Ileocolic anastomosis;  Surgeon: Hammad Reynolds MD;  Location: [facility] OR [address] [place] [address];  Service: Colon and Rectal;  Laterality: N/A;    ANTEGRADE NEPHROSTOGRAPHY Bilateral 9/28/2020    Procedure: Nephrostogram - antegrade;  Surgeon: Leslie Balbuena MD;  [facility];  Service: Urology;  Laterality: Bilateral;    ANTEGRADE NEPHROSTOGRAPHY Left 4/20/2021    Procedure: NEPHROSTOGRAM, ANTEGRADE;  Surgeon: Leslie Balbuena MD;  [facility];  Service: Urology;  Laterality: Left;    ANTEGRADE NEPHROSTOGRAPHY Right 10/27/2022    Procedure: NEPHROSTOGRAM, ANTEGRADE;  Surgeon: Chirag Russ MD;  [facility];   Service: Urology;  Laterality: Right;    ANTEGRADE NEPHROSTOGRAPHY Right 4/21/2023    Procedure: NEPHROSTOGRAM, ANTEGRADE;  Surgeon: Chirag Russ MD;  Location: [facility] OR 2ND FLR;  Service: Urology;  Laterality: Right;    ANTEGRADE NEPHROSTOGRAPHY Left 6/6/2023    Procedure: Nephrostogram - antegrade;  Surgeon: Leslie Balbuena MD;  Location: [facility] OR 1ST FLR;  Service: Urology;  Laterality: Left;    BILATERAL OOPHORECTOMY  2015    CHOLECYSTECTOMY  11/09/2016    Done at [facility], path showed chronic cholecystitis and gallstones    cold knife conization  2014    COLECTOMY, RIGHT  9/17/2020    Procedure: COLECTOMY, RIGHT Extended;  Surgeon: Hammad Reynolds MD;  Location: [facility] OR 2ND FLR;  Service: Colon and Rectal;;    COLONOSCOPY  2014    COLONOSCOPY N/A 10/24/2016    at [facility], Dr Gutiérrez    COLONOSCOPY N/A 5/18/2018    Procedure: COLONOSCOPY;  Surgeon: Arden Gutiérrez MD;  Location: [facility] (4TH FLR);  Service: Endoscopy;  Laterality: N/A;    COLONOSCOPY N/A 7/28/2020    Procedure: COLONOSCOPY;  Surgeon: Hammad Reynolds MD;  Location: [facility] (4TH FLR);  Service: Colon and Rectal;  Laterality: N/A;  covid test [place] 7/25    CYSTOSCOPY WITH URETEROSCOPY, RETROGRADE PYELOGRAPHY, AND INSERTION OF STENT Bilateral 3/21/2020    Procedure: CYSTOSCOPY, WITH RETROGRADE PYELOGRAM,;  Surgeon: Leslie Balbuena MD;  Location: [facility] OR 1ST FLR;  Service: Urology;  Laterality: Bilateral;    DILATION OF NEPHROSTOMY TRACT Right 10/27/2022    Procedure: DILATION, NEPHROSTOMY TRACT;  Surgeon: Chirag Russ MD;  Location: [facility] OR 1ST FLR;  Service: Urology;  Laterality: Right;    ESOPHAGOGASTRODUODENOSCOPY  2014    ESOPHAGOGASTRODUODENOSCOPY  11/18/2020    ESOPHAGOGASTRODUODENOSCOPY N/A 11/18/2020    Procedure: ESOPHAGOGASTRODUODENOSCOPY (EGD);  Surgeon: Zenon Spencer MD;  Location: [facility];  Service: Endoscopy;  Laterality: N/A;    ESOPHAGOGASTRODUODENOSCOPY N/A 12/11/2020    Procedure: EGD  (ESOPHAGOGASTRODUODENOSCOPY);  Surgeon: Juancho Muse MD;  Location: [facility] ENDO (2ND FLR);  Service: Endoscopy;  Laterality: N/A;    EXCISION, SMALL INTESTINE  2/6/2024    Procedure: EXCISION, SMALL INTESTINE;  Surgeon: Hammad Reynolds MD;  Location: [facility] OR 2ND FLR;  Service: Colon and Rectal;;    HYSTERECTOMY  2014    [facility] for cervical cancer    ILEOSTOMY  9/17/2020    Procedure: CREATION, ILEOSTOMY;  Surgeon: Hammad Reynolds MD;  Location: [facility] OR 2ND FLR;  Service: Colon and Rectal;;    ILEOSTOMY CLOSURE N/A 2/6/2024    Procedure: CLOSURE, ILEOSTOMY;  Surgeon: Hammad Reynolds MD;  [facility] 2ND FLR;  Service: Colon and Rectal;  Laterality: N/A;    LAPAROTOMY, EXPLORATORY N/A 2/6/2024    Procedure: OPEN LAPAROTOMY, EXPLORATORY;  Surgeon: Leslie Balbuena MD;  [facility] 2ND FLR;  Service: Urology;  Laterality: N/A;  22 modifier    LOOPOGRAM N/A 4/20/2021    Procedure: LOOPOGRAM;  Surgeon: Leslie Balbuena MD;  [facility] 1ST FLR;  Service: Urology;  Laterality: N/A;    LOOPOGRAM N/A 6/6/2023    Procedure: LOOPOGRAM;  Surgeon: Leslie Balbuena MD;  [facility] 1ST FLR;  Service: Urology;  Laterality: N/A;    LYSIS OF ADHESIONS  2/6/2024    Procedure: LYSIS, ADHESIONS;  Surgeon: Leslie Balbuena MD;  [facility] 2ND FLR;  Service: Urology;;    LYSIS OF ADHESIONS  2/6/2024    Procedure: LYSIS, ADHESIONS;  Surgeon: Hammad Reynolds MD;  [facility] 2ND FLR;  Service: Colon and Rectal;;    MOBILIZATION OF SPLENIC FLEXURE N/A 9/11/2020    Procedure: MOBILIZATION, COLONIC;  Surgeon: Hammad Reynolds MD;  [facility] 2ND FLR;  Service: Colon and Rectal;  Laterality: N/A;    NEPHROSTOGRAPHY Bilateral 4/17/2021    Procedure: Nephrostogram;  Surgeon: Celeste Surgeon;  Location: [facility];  Service: Anesthesiology;  Laterality: Bilateral;    OOPHORECTOMY      PERCUTANEOUS NEPHROLITHOTOMY Right 4/21/2023    Procedure: NEPHROLITHOTOMY, PERCUTANEOUS;  Surgeon: Chirag Russ MD;   Location: [facility] OR [facility] FLR;  Service: Urology;  Laterality: Right;  2.5 hrs    PERCUTANEOUS NEPHROSTOMY  4/21/2023    Procedure: CREATION, NEPHROSTOMY, PERCUTANEOUS with removal of existing nephrostomy tube;  Surgeon: Chirag Russ MD;  [facility];  Service: Urology;;    PYELOSCOPY Right 10/27/2022    Procedure: PYELOSCOPY;  Surgeon: Chirag Russ MD;  [facility];  Service: Urology;  Laterality: Right;    REPLACEMENT OF NEPHROSTOMY TUBE Right 10/27/2022    Procedure: REPLACEMENT, NEPHROSTOMY TUBE;  Surgeon: Chirag Russ MD;  [facility];  Service: Urology;  Laterality: Right;    RETROPERITONEAL LYMPHADENECTOMY Right 9/17/2018    Procedure: LYMPHADENECTOMY, RETROPERITONEUM;  Surgeon: Sebas Reed MD;  Location: [facility] OR [address] [place] [address];  Service: General;  Laterality: Right;  ILIAC    REVISION OF ANASTOMOSIS OF URETER TO ILEAL CONDUIT N/A 2/6/2024    Procedure: REVISION, ANASTOMOSIS, URETEROILEAL;  Surgeon: Leslie Balbuena MD;  [facility];  Service: Urology;  Laterality: N/A;    URETEROSCOPIC REMOVAL OF URETERIC CALCULUS Right 10/27/2022    Procedure: REMOVAL, CALCULUS, URETER, URETEROSCOPIC;  Surgeon: Chirag Russ MD;  Location: [address];  Service: Urology;  Laterality: Right;    URETEROSCOPY Right 10/27/2022    Procedure: URETEROSCOPY-ANTEGRADE;  Surgeon: Chirag Russ MD;  Location: [facility] OR [address] [place] [address];  Service: Urology;  Laterality: Right;        Family History   Problem Relation Name Age of Onset    Heart disease Sister      Heart disease Maternal Grandmother      Colon cancer Father      Esophageal cancer Father      Cancer Father          Lung-smoker    Cancer Mother          Cervical    Cervical cancer Mother      Breast cancer Maternal Aunt      Suicide Daughter Arquel         jumped from parking structure    Drug abuse Daughter Arquel     Drug abuse Daughter Arquet     Rectal cancer Neg Hx      Stomach cancer Neg Hx    "   Crohn's disease Neg Hx      Ulcerative colitis Neg Hx      Diabetes Neg Hx      Hypertension Neg Hx         Social History     Tobacco Use   Smoking Status Never   Smokeless Tobacco Never       Social History     Social History Narrative    , twin daughters (1  2018), disabled due to childhood stroke, [employer] sophia       Review of patient's allergies indicates:   Allergen Reactions    Bee sting [allergen ext-venom-honey bee]      Rash      Grass pollen-bermuda, standard      rash        Review of Systems   Respiratory:  Negative for chest tightness and shortness of breath.    Cardiovascular:  Negative for chest pain and palpitations.   Gastrointestinal:  Negative for nausea and vomiting.   Neurological:  Negative for dizziness, light-headedness and headaches.                Objective:      Vitals:    24 0915   BP: 96/64   BP Location: Left arm   Patient Position: Sitting   BP Method: Large (Automatic)   Pulse: 65   Temp: 98.3 °F (36.8 °C)   TempSrc: Oral   SpO2: 98%   Weight: 85.7 kg (189 lb 0.7 oz)   Height: 5' 9" (1.753 m)      Physical Exam  Pulmonary:      Effort: Pulmonary effort is normal. No respiratory distress.   Musculoskeletal:         General: Normal range of motion.      Cervical back: Normal range of motion.   Neurological:      Mental Status: She is alert and oriented to person, place, and time.         Assessment:       1. Encounter for wellness examination in adult    2. Essential hypertension    3. Nephrostomy status    4. Presence of urostomy    5. Does not have primary care provider    6. Pyelonephritis    7. Migraine without aura and without status migrainosus, not intractable    8. Gastroesophageal reflux disease with esophagitis, unspecified whether hemorrhage    9. Stage 3a chronic kidney disease    10. Anemia, unspecified type    11. Routine eye exam    12. Encounter for screening mammogram for breast cancer    13. Screening cholesterol level    14. " Screening for diabetes mellitus    15. Overweight with body mass index (BMI) of 27 to 27.9 in adult        Plan:       Encounter for wellness examination in adult  Counseled patient on importance of health prevention screening, immunizations, and overall wellness.   Immunizations reviewed.    -     Microalbumin/creatinine urine ratio    Essential hypertension  Stable, continuing current management.       Nephrostomy status  Comments:  right nephrostomy tube    Presence of urostomy  Comments:  approximately seven years in place    Does not have primary care provider  Establishing care with Dr. Jack.      Pyelonephritis  Stable, continuing current management.       Migraine without aura and without status migrainosus, not intractable  Stable, continuing current management.       Gastroesophageal reflux disease with esophagitis, unspecified whether hemorrhage  Stable, continuing current management.       Stage 3a chronic kidney disease  09/06/2024 GFR-57.2    Anemia, unspecified type  08/12/2024 HGB-11.5 HCT 38.3  Recommend iron rich foods as tolerated, handout provided.     Routine eye exam  -     Ambulatory referral/consult to Optometry; Future; Expected date: 09/23/2024    Encounter for screening mammogram for breast cancer  -     Mammo Digital Screening Bilat w/ Ken; Future; Expected date: 09/23/2024    Screening cholesterol level  -     Lipid Panel; Future; Expected date: 09/23/2024    Screening for diabetes mellitus  -     Hemoglobin A1C; Future; Expected date: 09/23/2024    Overweight with body mass index (BMI) of 27 to 27.9 in adult  Recommend Dash/Mediterranean diet, exercise 3 times a week for 30 minute intervals, increase as tolerated.        Health maintenance review/updated.     Plans to complete labs today.       Medication List with Changes/Refills   Current Medications    ACETAMINOPHEN (TYLENOL) 500 MG TABLET    Take 1 tablet (500 mg total) by mouth every 6 (six) hours as needed for Pain.     AMLODIPINE (NORVASC) 5 MG TABLET    Take 1 tablet (5 mg total) by mouth once daily.    FERROUS SULFATE (FEOSOL) 325 MG (65 MG IRON) TAB TABLET    TAKE 1 TABLET BY MOUTH TWICE A DAY    LORATADINE (CLARITIN) 10 MG TABLET    Take 10 mg by mouth daily as needed for Allergies.    MIRTAZAPINE (REMERON) 30 MG TABLET    Take 1 tablet (30 mg total) by mouth nightly.    PANTOPRAZOLE (PROTONIX) 40 MG TABLET    Take 1 tablet (40 mg total) by mouth once daily. for 14 days        Follow up in about 3 months (around 12/23/2024) for Dr. Hernan Jack.    I spent a total of 40 minutes on the day of the visit.This includes face to face time and non-face to face time preparing to see the patient (eg, review of tests), obtaining and/or reviewing separately obtained history, documenting clinical information in the electronic or other health record, independently interpreting results and communicating results to the patient/family/caregiver, or care coordinator.     Pamela Muse, APRN, MSN, FNP-C

## 2024-09-23 NOTE — PATIENT INSTRUCTIONS
You can call any of the following numbers to schedule your referral, if you could not get scheduled today: [phone] or [phone].       Please get your labs done at any [facility] facility that has a laboratory, you do not need an appointment.     I will communicate your laboratory and/or imaging results with you through your [email or web address] account that was set up through the portal, it was a pleasure meeting and taking care of you today.

## 2024-10-02 ENCOUNTER — TELEPHONE (OUTPATIENT)
Dept: UROLOGY | Facility: CLINIC | Age: 59
End: 2024-10-02

## 2024-10-02 NOTE — TELEPHONE ENCOUNTER
"----- Message from Marli sent at 10/2/2024 12:34 PM CDT -----  Regarding: pt adivce  Contact: [phone]  .Name Of Caller: Linda/ Hayden      Contact Preference?:[phone]     What is the nature of the call?: pt has questions pertaining to procedure that supposedly to be schedule.  Pt having right flank pains Pls call      Additional Notes:  "Thank you for all that you do for our patients"  "

## 2024-11-06 ENCOUNTER — TELEPHONE (OUTPATIENT)
Dept: INTERVENTIONAL RADIOLOGY/VASCULAR | Facility: HOSPITAL | Age: 59
End: 2024-11-06
Payer: MEDICAID

## 2024-11-06 NOTE — NURSING
Pre-procedure call complete.  2 patient identifier used (name and ).  Pt instructed not to eat or drink anything after midnight the night before procedure.  Pt aware will need someone to provide transport home and monitor pt 8 hours post procedure.  No driving for at least 24 hours after procedure.   Patient advised to take blood pressure, heart medications,  with a sip of water morning of procedure.  Patient verbalized aware of which medications to take.  Do not take sleep medication (including OTC) and anxiety medication the night before procedure.  Arrival time and location given.  Expected length of stay reviewed.  Covid screening completed.  Pt verbalized understanding of all pre-procedure instructions.  Written instructions and directions sent to patient in [employer]/portal.

## 2024-11-08 ENCOUNTER — HOSPITAL ENCOUNTER (OUTPATIENT)
Dept: INTERVENTIONAL RADIOLOGY/VASCULAR | Facility: HOSPITAL | Age: 59
Discharge: HOME | End: 2024-11-08
Attending: FAMILY MEDICINE
Payer: MEDICAID

## 2024-11-08 VITALS
HEIGHT: 69 IN | RESPIRATION RATE: 18 BRPM | WEIGHT: 187 LBS | HEART RATE: 64 BPM | SYSTOLIC BLOOD PRESSURE: 114 MMHG | DIASTOLIC BLOOD PRESSURE: 68 MMHG | OXYGEN SATURATION: 100 % | BODY MASS INDEX: 27.7 KG/M2 | TEMPERATURE: 97 F

## 2024-11-08 DIAGNOSIS — N13.1 HYDRONEPHROSIS WITH URETERAL STRICTURE: Primary | ICD-10-CM

## 2024-11-08 DIAGNOSIS — N13.1 HYDRONEPHROSIS WITH URETERAL STRICTURE: ICD-10-CM

## 2024-11-08 PROCEDURE — C1769 GUIDE WIRE: HCPCS

## 2024-11-08 PROCEDURE — 50435 EXCHANGE NEPHROSTOMY CATH: CPT | Mod: RT | Performed by: INTERNAL MEDICINE

## 2024-11-08 PROCEDURE — 63600175 PHARM REV CODE 636 W HCPCS: Performed by: INTERNAL MEDICINE

## 2024-11-08 PROCEDURE — C1729 CATH, DRAINAGE: HCPCS

## 2024-11-08 RX ORDER — FENTANYL CITRATE 50 UG/ML
INJECTION, SOLUTION INTRAMUSCULAR; INTRAVENOUS
Status: COMPLETED | OUTPATIENT
Start: 2024-11-08 | End: 2024-11-08

## 2024-11-08 RX ORDER — SODIUM CHLORIDE 9 MG/ML
INJECTION, SOLUTION INTRAVENOUS CONTINUOUS
Status: DISCONTINUED | OUTPATIENT
Start: 2024-11-08 | End: 2024-11-09 | Stop reason: HOSPADM

## 2024-11-08 RX ORDER — MIDAZOLAM HYDROCHLORIDE 1 MG/ML
INJECTION, SOLUTION INTRAMUSCULAR; INTRAVENOUS
Status: COMPLETED | OUTPATIENT
Start: 2024-11-08 | End: 2024-11-08

## 2024-11-08 RX ORDER — LIDOCAINE HYDROCHLORIDE 10 MG/ML
1 INJECTION, SOLUTION EPIDURAL; INFILTRATION; INTRACAUDAL; PERINEURAL ONCE
Status: DISCONTINUED | OUTPATIENT
Start: 2024-11-08 | End: 2024-11-09 | Stop reason: HOSPADM

## 2024-11-08 RX ORDER — ONDANSETRON HYDROCHLORIDE 2 MG/ML
INJECTION, SOLUTION INTRAVENOUS
Status: COMPLETED | OUTPATIENT
Start: 2024-11-08 | End: 2024-11-08

## 2024-11-08 RX ADMIN — ONDANSETRON 4 MG: 2 INJECTION INTRAMUSCULAR; INTRAVENOUS at 12:11

## 2024-11-08 RX ADMIN — MIDAZOLAM HYDROCHLORIDE 0.5 MG: 2 INJECTION, SOLUTION INTRAMUSCULAR; INTRAVENOUS at 12:11

## 2024-11-08 RX ADMIN — FENTANYL CITRATE 25 MCG: 50 INJECTION, SOLUTION INTRAMUSCULAR; INTRAVENOUS at 12:11

## 2024-11-08 RX ADMIN — FENTANYL CITRATE 50 MCG: 50 INJECTION, SOLUTION INTRAMUSCULAR; INTRAVENOUS at 12:11

## 2024-11-08 RX ADMIN — MIDAZOLAM HYDROCHLORIDE 1 MG: 2 INJECTION, SOLUTION INTRAMUSCULAR; INTRAVENOUS at 12:11

## 2024-11-08 NOTE — DISCHARGE INSTRUCTIONS
For questions or concerns call: DIANA MON-FRI 8 AM- 5PM [phone]. Radiology resident on call [phone].    For immediate concerns that are not emergent, you may call our radiology clinic at: [phone]

## 2024-11-08 NOTE — H&P
Radiology History & Physical      SUBJECTIVE:     Chief Complaint: L nephrostomy tube in place    History of Present Illness:  Edita Riley is a 59 y.o. female who presents for L nephrostomy tube exchange.    Past Medical History:   Diagnosis Date    Abnormal mammogram 08/25/2020    Abnormal mammogram 08/25/2020    Acute blood loss anemia     Acute deep vein thrombosis (DVT) of lower extremity 12/09/2020    Advance care planning 04/30/2021    ODESSA (acute kidney injury) 03/21/2020    Anemia due to chronic blood loss     Anemia due to chronic kidney disease     Anemia due to chronic kidney disease 05/18/2020    Anxiety     Bilateral ureteral obstruction 09/11/2020    Bilious vomiting with nausea 06/11/2023    Cardiovascular event risk -- low 09/14/2015    ASCVD 10-year risk 1.9% (with optimal risk factors 1.3%) as of 9/14/2015     Cervical cancer 2014    Chronic back pain     Colostomy care     Deep vein thrombosis     Depression     Diarrhea due to malabsorption 11/14/2018    Difficult intubation     Discharge planning issues 04/30/2021    Disorder of kidney and ureter     DVT of lower extremity, bilateral 11/04/2020    Edema 04/10/2023    Emphysema of lung 04/10/2023    Fibromyalgia     Fungemia 09/27/2020    Generalized abdominal pain 08/25/2020    GERD (gastroesophageal reflux disease)     Hemifacial spasm 09/16/2015    Hiatal hernia 2014    History of cervical cancer 10/11/2018    History of essential hypertension 05/04/2015    Not requiring medications at this time  BP is low- concern that this may correlate with increased stool output from stoma. Encourage her to contact GI and her PCP.    Hx of psychiatric care     Cymbalta, trazodone    Hypertension     Hypomagnesemia 11/21/2018    Hyponatremia 09/10/2023    Impaired functional mobility, balance, gait, and endurance 07/24/2015    Lactose intolerance     Major depressive disorder, recurrent episode, moderate 06/02/2023    Metastatic squamous cell  carcinoma to lymph node 10/11/2018    Moderate episode of recurrent major depressive disorder 04/21/2021    Nephrostomy complication 10/26/2023    Neuropathy due to chemotherapeutic drug 11/14/2018    Osteoarthritis of back     Peritonitis 09/22/2020    Physical deconditioning 04/30/2021    Pseudomonas urinary tract infection 04/21/2021    Psychiatric problem     Pyelitis 09/09/2023    QT prolongation 04/16/2021    Refusal of blood transfusions as patient is [place]     Schatzki's ring 09/14/2015    Seen on outside EGD 05/2014, underwent esophageal dilatation. Bx were negative.     Seizure-like activity 12/02/2018    Seizures     Sleep stage dysfunction     Noted on PSG 06/2017; negative for obstructive sleep apnea     Stroke     Urinary tract infection associated with nephrostomy catheter 06/11/2020    Wound infection after surgery 09/24/2020     Past Surgical History:   Procedure Laterality Date    ANASTOMOSIS OF INTESTINE N/A 2/6/2024    Procedure: ANASTOMOSIS, INTESTINE - Ileocolic anastomosis;  Surgeon: Hammad Reynolds MD;  Location: [facility] OR 2ND FLR;  Service: Colon and Rectal;  Laterality: N/A;    ANTEGRADE NEPHROSTOGRAPHY Bilateral 9/28/2020    Procedure: Nephrostogram - antegrade;  Surgeon: Leslie Balbuena MD;  Location: [facility] OR 1ST FLR;  Service: Urology;  Laterality: Bilateral;    ANTEGRADE NEPHROSTOGRAPHY Left 4/20/2021    Procedure: NEPHROSTOGRAM, ANTEGRADE;  Surgeon: Leslie Balbuena MD;  Location: [facility] OR 1ST FLR;  Service: Urology;  Laterality: Left;    ANTEGRADE NEPHROSTOGRAPHY Right 10/27/2022    Procedure: NEPHROSTOGRAM, ANTEGRADE;  Surgeon: Chirag Russ MD;  Location: [facility] OR 1ST FLR;  Service: Urology;  Laterality: Right;    ANTEGRADE NEPHROSTOGRAPHY Right 4/21/2023    Procedure: NEPHROSTOGRAM, ANTEGRADE;  Surgeon: Chirag Russ MD;  Location: [facility] OR 2ND FLR;  Service: Urology;  Laterality: Right;    ANTEGRADE NEPHROSTOGRAPHY Left 6/6/2023    Procedure: Nephrostogram -  antegrade;  Surgeon: Leslie Balbuena MD;  Location: [facility] OR 1ST FLR;  Service: Urology;  Laterality: Left;    BILATERAL OOPHORECTOMY  2015    CHOLECYSTECTOMY  11/09/2016    Done at [facility], path showed chronic cholecystitis and gallstones    cold knife conization  2014    COLECTOMY, RIGHT  9/17/2020    Procedure: COLECTOMY, RIGHT Extended;  Surgeon: Hammad Reynolds MD;  Location: [facility] OR 2ND FLR;  Service: Colon and Rectal;;    COLONOSCOPY  2014    COLONOSCOPY N/A 10/24/2016    at [facility], Dr Gutiérrez    COLONOSCOPY N/A 5/18/2018    Procedure: COLONOSCOPY;  Surgeon: Arden Gutiérrez MD;  Location: [facility] ENDO (4TH FLR);  Service: Endoscopy;  Laterality: N/A;    COLONOSCOPY N/A 7/28/2020    Procedure: COLONOSCOPY;  Surgeon: Hammad Reynolds MD;  Location: [facility] ENDO (4TH FLR);  Service: Colon and Rectal;  Laterality: N/A;  covid test [place] 7/25    CYSTOSCOPY WITH URETEROSCOPY, RETROGRADE PYELOGRAPHY, AND INSERTION OF STENT Bilateral 3/21/2020    Procedure: CYSTOSCOPY, WITH RETROGRADE PYELOGRAM,;  Surgeon: Leslie Balbuena MD;  Location: [facility] OR 1ST FLR;  Service: Urology;  Laterality: Bilateral;    DILATION OF NEPHROSTOMY TRACT Right 10/27/2022    Procedure: DILATION, NEPHROSTOMY TRACT;  Surgeon: Chirag Russ MD;  Location: [facility] OR 1ST FLR;  Service: Urology;  Laterality: Right;    ESOPHAGOGASTRODUODENOSCOPY  2014    ESOPHAGOGASTRODUODENOSCOPY  11/18/2020    ESOPHAGOGASTRODUODENOSCOPY N/A 11/18/2020    Procedure: ESOPHAGOGASTRODUODENOSCOPY (EGD);  Surgeon: Zenon Spencer MD;  Location: [facility] ENDO;  Service: Endoscopy;  Laterality: N/A;    ESOPHAGOGASTRODUODENOSCOPY N/A 12/11/2020    Procedure: EGD (ESOPHAGOGASTRODUODENOSCOPY);  Surgeon: Juancho Muse MD;  Location: [facility] ENDO (2ND FLR);  Service: Endoscopy;  Laterality: N/A;    EXCISION, SMALL INTESTINE  2/6/2024    Procedure: EXCISION, SMALL INTESTINE;  Surgeon: Hammad Reynolds MD;  Location: [facility] OR [address] [place] [address];  Service: Colon and Rectal;;    HYSTERECTOMY   2014    [facility] for cervical cancer    ILEOSTOMY  9/17/2020    Procedure: CREATION, ILEOSTOMY;  Surgeon: Hammad Reynolds MD;  Location: [facility] OR 2ND FLR;  Service: Colon and Rectal;;    ILEOSTOMY CLOSURE N/A 2/6/2024    Procedure: CLOSURE, ILEOSTOMY;  Surgeon: Hammad Reynlods MD;  Location: [facility] OR 2ND FLR;  Service: Colon and Rectal;  Laterality: N/A;    LAPAROTOMY, EXPLORATORY N/A 2/6/2024    Procedure: OPEN LAPAROTOMY, EXPLORATORY;  Surgeon: Leslie Balbuena MD;  Location: [facility] OR 2ND FLR;  Service: Urology;  Laterality: N/A;  22 modifier    LOOPOGRAM N/A 4/20/2021    Procedure: LOOPOGRAM;  Surgeon: Leslie Balbuena MD;  Location: [facility] OR 1ST FLR;  Service: Urology;  Laterality: N/A;    LOOPOGRAM N/A 6/6/2023    Procedure: LOOPOGRAM;  Surgeon: Leslie Balbuena MD;  Location: [facility] OR 1ST FLR;  Service: Urology;  Laterality: N/A;    LYSIS OF ADHESIONS  2/6/2024    Procedure: LYSIS, ADHESIONS;  Surgeon: Leslie Balbuena MD;  Location: [facility] OR 2ND FLR;  Service: Urology;;    LYSIS OF ADHESIONS  2/6/2024    Procedure: LYSIS, ADHESIONS;  Surgeon: Hammad Reynolds MD;  Location: [facility] OR 2ND FLR;  Service: Colon and Rectal;;    MOBILIZATION OF SPLENIC FLEXURE N/A 9/11/2020    Procedure: MOBILIZATION, COLONIC;  Surgeon: Hammad Reynolds MD;  Location: [facility] OR 2ND FLR;  Service: Colon and Rectal;  Laterality: N/A;    NEPHROSTOGRAPHY Bilateral 4/17/2021    Procedure: Nephrostogram;  Surgeon: Celeste Surgeon;  Location: [facility];  Service: Anesthesiology;  Laterality: Bilateral;    OOPHORECTOMY      PERCUTANEOUS NEPHROLITHOTOMY Right 4/21/2023    Procedure: NEPHROLITHOTOMY, PERCUTANEOUS;  Surgeon: Chirag Russ MD;  Location: [facility] OR 2ND FLR;  Service: Urology;  Laterality: Right;  2.5 hrs    PERCUTANEOUS NEPHROSTOMY  4/21/2023    Procedure: CREATION, NEPHROSTOMY, PERCUTANEOUS with removal of existing nephrostomy tube;  Surgeon: Chirag Russ MD;  Location: [facility] OR [address] [place] [address];  Service: Urology;;    PYELOSCOPY Right  10/27/2022    Procedure: PYELOSCOPY;  Surgeon: Chirag Russ MD;  Location: [facility] OR 1ST FLR;  Service: Urology;  Laterality: Right;    REPLACEMENT OF NEPHROSTOMY TUBE Right 10/27/2022    Procedure: REPLACEMENT, NEPHROSTOMY TUBE;  Surgeon: Chirag Russ MD;  Location: [facility] OR 1ST FLR;  Service: Urology;  Laterality: Right;    RETROPERITONEAL LYMPHADENECTOMY Right 9/17/2018    Procedure: LYMPHADENECTOMY, RETROPERITONEUM;  Surgeon: Sebas Reed MD;  Location: [facility] OR 2ND FLR;  Service: General;  Laterality: Right;  ILIAC    REVISION OF ANASTOMOSIS OF URETER TO ILEAL CONDUIT N/A 2/6/2024    Procedure: REVISION, ANASTOMOSIS, URETEROILEAL;  Surgeon: Leslie Balbuena MD;  Location: [facility] OR 2ND FLR;  Service: Urology;  Laterality: N/A;    URETEROSCOPIC REMOVAL OF URETERIC CALCULUS Right 10/27/2022    Procedure: REMOVAL, CALCULUS, URETER, URETEROSCOPIC;  Surgeon: Chirag Russ MD;  [facility];  Service: Urology;  Laterality: Right;    URETEROSCOPY Right 10/27/2022    Procedure: URETEROSCOPY-ANTEGRADE;  Surgeon: Chirag Russ MD;  [facility];  Service: Urology;  Laterality: Right;       Home Meds:   Prior to Admission medications    Medication Sig Start Date End Date Taking? Authorizing Provider   acetaminophen (TYLENOL) 500 MG tablet Take 1 tablet (500 mg total) by mouth every 6 (six) hours as needed for Pain. 2/18/24  Yes Dave Tatum MD   amLODIPine (NORVASC) 5 MG tablet Take 1 tablet (5 mg total) by mouth once daily. 8/13/24 8/13/25 Yes Brittney Bolton MD   ferrous sulfate (FEOSOL) 325 mg (65 mg iron) Tab tablet TAKE 1 TABLET BY MOUTH TWICE A DAY 3/12/24  Yes Leslie Balbuena MD   loratadine (CLARITIN) 10 mg tablet Take 10 mg by mouth daily as needed for Allergies. 9/5/23  Yes Provider, Historical   mirtazapine (REMERON) 30 MG tablet Take 1 tablet (30 mg total) by mouth nightly. 11/6/23 11/5/24  Andriy Coley MD   pantoprazole (PROTONIX) 40 MG tablet  Take 1 tablet (40 mg total) by mouth once daily. for 14 days 8/13/24 9/23/24  Brittney Bolton MD     Anticoagulants/Antiplatelets: no anticoagulation    Allergies:   Review of patient's allergies indicates:   Allergen Reactions    Bee sting [allergen ext-venom-honey bee]      Rash      Grass pollen-bermuda, standard      rash     Sedation History:  no adverse reactions    Review of Systems:   Hematological: no known coagulopathies  Respiratory: no shortness of breath  Cardiovascular: no chest pain  Gastrointestinal: no abdominal pain  Genito-Urinary: no dysuria  Musculoskeletal: negative  Neurological: no TIA or stroke symptoms         OBJECTIVE:     Vital Signs (Most Recent)       Physical Exam:  ASA: 2  Mallampati: 2    General: no acute distress  Mental Status: alert and oriented to person, place and time  HEENT: normocephalic, atraumatic  Chest: unlabored breathing  Abdomen: nondistended/ L nephrostomy tube in place  Extremity: moves all extremities    Laboratory  Lab Results   Component Value Date    INR 1.0 08/07/2024       Lab Results   Component Value Date    WBC 6.19 08/12/2024    HGB 11.5 (L) 08/12/2024    HCT 38.3 08/12/2024    MCV 90 08/12/2024     08/12/2024      Lab Results   Component Value Date    GLU 68 (L) 09/06/2024     09/06/2024    K 3.6 09/06/2024     09/06/2024    CO2 23 09/06/2024    BUN 9 09/06/2024    CREATININE 1.1 09/06/2024    CALCIUM 9.6 09/06/2024    MG 1.8 08/08/2024    ALT 29 08/07/2024    AST 31 08/07/2024    ALBUMIN 3.3 (L) 08/07/2024    BILITOT 0.4 08/07/2024    BILIDIR 0.2 12/04/2020       ASSESSMENT/PLAN:     Sedation Plan: up to moderate.  Patient will undergo L nephrostomy tube exchange.    Edinson Del Toro MD  Department of Radiology, PGY-3

## 2024-11-08 NOTE — PROGRESS NOTES
Pt  D/C  and  has  been waiting  on transport  / staff transport  pt to [facility]  due  to  extended  time  waiting  on transporter

## 2024-11-08 NOTE — PROGRESS NOTES
Pt resting and watching  TV / Nephro Tube patent open  and  to  bag / pt  for 1  hour recovery then return  to  room

## 2024-11-08 NOTE — PROCEDURES
VIR Post-Procedure Note    Pre Op Diagnosis:   Urinary obstruction with hydronephrosis (ureteroileal conduit)    Post Op Diagnosis:  same    Procedure: right percutaneous nephrostomy tube change.     Procedure performed by: Blanche Villasenor MD      Written Informed Consent Obtained:  Yes    Specimen Removed: No     Estimated Blood Loss:  Minimal     Findings:      Successful exchange of 10 nephrostomy tube.     Plan:    Routine exchange with VIR unless another intervention performed earlier.           Blanche Villasenor MD  VIR

## 2024-11-21 ENCOUNTER — TELEPHONE (OUTPATIENT)
Dept: UROLOGY | Facility: CLINIC | Age: 59
End: 2024-11-21

## 2024-11-21 NOTE — TELEPHONE ENCOUNTER
"----- Message from Marli sent at 11/21/2024 12:48 PM CST -----  Regarding: pt advice  Contact: [phone]  .Name Of Caller: Linda/ Darren     Contact Preference?:[phone]     What is the nature of the call?:re/p/ pt calling in regards to pt follow up appt due  . Pls call    Additional Notes:  "Thank you for all that you do for our patients"  "

## 2024-12-05 NOTE — TELEPHONE ENCOUNTER
----- Message from Layne sent at 2024  3:34 PM CST -----  Regardinnd request Linda called back in regards to getting Pt an appt  Contact: [phone]  Name of Who is Calling:Linda/Darren        What is the request in detail:2nd request Linda called back in regards to getting Pt an appt. Please advise         Can the clinic reply by [facility]:No        What Number to Call Back if not in [facility]: Telephone Information:  Mobile          [phone]

## 2024-12-16 NOTE — TELEPHONE ENCOUNTER
Returned call to Linda () for [employer]. Notified that MD is planning to reach out to pt for a procedure. Verbalized understanding. MD can contact her if needed.

## 2024-12-24 ENCOUNTER — HOSPITAL ENCOUNTER (INPATIENT)
Facility: HOSPITAL | Age: 59
LOS: 4 days | Discharge: HOME | DRG: 699 | End: 2024-12-28
Attending: EMERGENCY MEDICINE | Admitting: FAMILY MEDICINE
Payer: MEDICAID

## 2024-12-24 DIAGNOSIS — N39.0 URINARY TRACT INFECTION WITHOUT HEMATURIA, SITE UNSPECIFIED: Primary | ICD-10-CM

## 2024-12-24 DIAGNOSIS — T83.098A MALFUNCTION OF NEPHROSTOMY TUBE: ICD-10-CM

## 2024-12-24 DIAGNOSIS — N12 PYELONEPHRITIS OF RIGHT KIDNEY: ICD-10-CM

## 2024-12-24 DIAGNOSIS — R07.9 CHEST PAIN: ICD-10-CM

## 2024-12-24 LAB
ALBUMIN SERPL BCP-MCNC: 3.4 G/DL (ref 3.5–5.2)
ALP SERPL-CCNC: 117 U/L (ref 40–150)
ALT SERPL W/O P-5'-P-CCNC: 19 U/L (ref 10–44)
ANION GAP SERPL CALC-SCNC: 11 MMOL/L (ref 8–16)
AST SERPL-CCNC: 22 U/L (ref 10–40)
BACTERIA #/AREA URNS AUTO: ABNORMAL /HPF
BASOPHILS # BLD AUTO: 0.04 K/UL (ref 0–0.2)
BASOPHILS NFR BLD: 0.5 % (ref 0–1.9)
BILIRUB SERPL-MCNC: 0.3 MG/DL (ref 0.1–1)
BILIRUB UR QL STRIP: NEGATIVE
BUN SERPL-MCNC: 15 MG/DL (ref 8–23)
CALCIUM SERPL-MCNC: 9.6 MG/DL (ref 8.7–10.5)
CHLORIDE SERPL-SCNC: 109 MMOL/L (ref 95–110)
CLARITY UR REFRACT.AUTO: ABNORMAL
CO2 SERPL-SCNC: 22 MMOL/L (ref 23–29)
COLOR UR AUTO: ABNORMAL
CREAT SERPL-MCNC: 1.3 MG/DL (ref 0.5–1.4)
DIFFERENTIAL METHOD BLD: ABNORMAL
EOSINOPHIL # BLD AUTO: 0.2 K/UL (ref 0–0.5)
EOSINOPHIL NFR BLD: 2 % (ref 0–8)
ERYTHROCYTE [DISTWIDTH] IN BLOOD BY AUTOMATED COUNT: 15.3 % (ref 11.5–14.5)
EST. GFR  (NO RACE VARIABLE): 46.8 ML/MIN/1.73 M^2
GLUCOSE SERPL-MCNC: 86 MG/DL (ref 70–110)
GLUCOSE UR QL STRIP: NEGATIVE
HCT VFR BLD AUTO: 40.9 % (ref 37–48.5)
HGB BLD-MCNC: 12.5 G/DL (ref 12–16)
HGB UR QL STRIP: ABNORMAL
HYALINE CASTS UR QL AUTO: 0 /LPF
IMM GRANULOCYTES # BLD AUTO: 0.01 K/UL (ref 0–0.04)
IMM GRANULOCYTES NFR BLD AUTO: 0.1 % (ref 0–0.5)
KETONES UR QL STRIP: NEGATIVE
LEUKOCYTE ESTERASE UR QL STRIP: ABNORMAL
LYMPHOCYTES # BLD AUTO: 2.2 K/UL (ref 1–4.8)
LYMPHOCYTES NFR BLD: 27.9 % (ref 18–48)
MCH RBC QN AUTO: 27.5 PG (ref 27–31)
MCHC RBC AUTO-ENTMCNC: 30.6 G/DL (ref 32–36)
MCV RBC AUTO: 90 FL (ref 82–98)
MICROSCOPIC COMMENT: ABNORMAL
MONOCYTES # BLD AUTO: 0.9 K/UL (ref 0.3–1)
MONOCYTES NFR BLD: 11.5 % (ref 4–15)
NEUTROPHILS # BLD AUTO: 4.6 K/UL (ref 1.8–7.7)
NEUTROPHILS NFR BLD: 58 % (ref 38–73)
NITRITE UR QL STRIP: NEGATIVE
NRBC BLD-RTO: 0 /100 WBC
PH UR STRIP: 6 [PH] (ref 5–8)
PLATELET # BLD AUTO: 245 K/UL (ref 150–450)
PMV BLD AUTO: 10.6 FL (ref 9.2–12.9)
POTASSIUM SERPL-SCNC: 4.4 MMOL/L (ref 3.5–5.1)
PROT SERPL-MCNC: 7.9 G/DL (ref 6–8.4)
PROT UR QL STRIP: ABNORMAL
RBC # BLD AUTO: 4.55 M/UL (ref 4–5.4)
RBC #/AREA URNS AUTO: >100 /HPF (ref 0–4)
SODIUM SERPL-SCNC: 142 MMOL/L (ref 136–145)
SP GR UR STRIP: >=1.03 (ref 1–1.03)
SQUAMOUS #/AREA URNS AUTO: 1 /HPF
URN SPEC COLLECT METH UR: ABNORMAL
WBC # BLD AUTO: 7.98 K/UL (ref 3.9–12.7)
WBC #/AREA URNS AUTO: >100 /HPF (ref 0–5)

## 2024-12-24 PROCEDURE — 87184 SC STD DISK METHOD PER PLATE: CPT | Performed by: NURSE PRACTITIONER

## 2024-12-24 PROCEDURE — 80053 COMPREHEN METABOLIC PANEL: CPT | Performed by: NURSE PRACTITIONER

## 2024-12-24 PROCEDURE — 87086 URINE CULTURE/COLONY COUNT: CPT | Performed by: NURSE PRACTITIONER

## 2024-12-24 PROCEDURE — 87077 CULTURE AEROBIC IDENTIFY: CPT | Performed by: NURSE PRACTITIONER

## 2024-12-24 PROCEDURE — 63600175 PHARM REV CODE 636 W HCPCS: Performed by: STUDENT IN AN ORGANIZED HEALTH CARE EDUCATION/TRAINING PROGRAM

## 2024-12-24 PROCEDURE — 11000001 HC ACUTE MED/SURG PRIVATE ROOM

## 2024-12-24 PROCEDURE — G0378 HOSPITAL OBSERVATION PER HR: HCPCS

## 2024-12-24 PROCEDURE — 63600175 PHARM REV CODE 636 W HCPCS: Performed by: EMERGENCY MEDICINE

## 2024-12-24 PROCEDURE — 85025 COMPLETE CBC W/AUTO DIFF WBC: CPT | Performed by: NURSE PRACTITIONER

## 2024-12-24 PROCEDURE — 25000003 PHARM REV CODE 250: Performed by: EMERGENCY MEDICINE

## 2024-12-24 PROCEDURE — 96375 TX/PRO/DX INJ NEW DRUG ADDON: CPT

## 2024-12-24 PROCEDURE — 87186 SC STD MICRODIL/AGAR DIL: CPT | Performed by: NURSE PRACTITIONER

## 2024-12-24 PROCEDURE — 25000003 PHARM REV CODE 250: Performed by: HOSPITALIST

## 2024-12-24 PROCEDURE — 99285 EMERGENCY DEPT VISIT HI MDM: CPT | Mod: 25

## 2024-12-24 PROCEDURE — 25500020 PHARM REV CODE 255: Performed by: EMERGENCY MEDICINE

## 2024-12-24 PROCEDURE — 81001 URINALYSIS AUTO W/SCOPE: CPT | Performed by: NURSE PRACTITIONER

## 2024-12-24 PROCEDURE — 0T25X0Z CHANGE DRAINAGE DEVICE IN KIDNEY, EXTERNAL APPROACH: ICD-10-PCS | Performed by: STUDENT IN AN ORGANIZED HEALTH CARE EDUCATION/TRAINING PROGRAM

## 2024-12-24 PROCEDURE — 87088 URINE BACTERIA CULTURE: CPT | Performed by: NURSE PRACTITIONER

## 2024-12-24 PROCEDURE — 87205 SMEAR GRAM STAIN: CPT | Performed by: NURSE PRACTITIONER

## 2024-12-24 PROCEDURE — 96365 THER/PROPH/DIAG IV INF INIT: CPT

## 2024-12-24 PROCEDURE — 25000003 PHARM REV CODE 250: Performed by: STUDENT IN AN ORGANIZED HEALTH CARE EDUCATION/TRAINING PROGRAM

## 2024-12-24 PROCEDURE — 63600175 PHARM REV CODE 636 W HCPCS: Performed by: HOSPITALIST

## 2024-12-24 RX ORDER — OXYCODONE AND ACETAMINOPHEN 5; 325 MG/1; MG/1
1 TABLET ORAL EVERY 6 HOURS PRN
Status: DISCONTINUED | OUTPATIENT
Start: 2024-12-24 | End: 2024-12-28 | Stop reason: HOSPADM

## 2024-12-24 RX ORDER — AMOXICILLIN 250 MG
1 CAPSULE ORAL DAILY PRN
Status: DISCONTINUED | OUTPATIENT
Start: 2024-12-24 | End: 2024-12-28 | Stop reason: HOSPADM

## 2024-12-24 RX ORDER — PROCHLORPERAZINE EDISYLATE 5 MG/ML
5 INJECTION INTRAMUSCULAR; INTRAVENOUS EVERY 6 HOURS PRN
Status: DISCONTINUED | OUTPATIENT
Start: 2024-12-24 | End: 2024-12-28 | Stop reason: HOSPADM

## 2024-12-24 RX ORDER — ENOXAPARIN SODIUM 100 MG/ML
40 INJECTION SUBCUTANEOUS EVERY 24 HOURS
Status: DISCONTINUED | OUTPATIENT
Start: 2024-12-25 | End: 2024-12-28 | Stop reason: HOSPADM

## 2024-12-24 RX ORDER — CETIRIZINE HYDROCHLORIDE 10 MG/1
10 TABLET ORAL DAILY PRN
Status: DISCONTINUED | OUTPATIENT
Start: 2024-12-24 | End: 2024-12-28 | Stop reason: HOSPADM

## 2024-12-24 RX ORDER — POLYETHYLENE GLYCOL 3350 17 G/17G
17 POWDER, FOR SOLUTION ORAL 2 TIMES DAILY PRN
Status: DISCONTINUED | OUTPATIENT
Start: 2024-12-24 | End: 2024-12-28 | Stop reason: HOSPADM

## 2024-12-24 RX ORDER — ACETAMINOPHEN 500 MG
1000 TABLET ORAL
Status: COMPLETED | OUTPATIENT
Start: 2024-12-24 | End: 2024-12-24

## 2024-12-24 RX ORDER — LANOLIN ALCOHOL/MO/W.PET/CERES
1 CREAM (GRAM) TOPICAL 2 TIMES DAILY
Status: DISCONTINUED | OUTPATIENT
Start: 2024-12-25 | End: 2024-12-28 | Stop reason: HOSPADM

## 2024-12-24 RX ORDER — FENTANYL CITRATE 50 UG/ML
INJECTION, SOLUTION INTRAMUSCULAR; INTRAVENOUS
Status: COMPLETED | OUTPATIENT
Start: 2024-12-24 | End: 2024-12-24

## 2024-12-24 RX ORDER — MIRTAZAPINE 30 MG/1
30 TABLET, FILM COATED ORAL NIGHTLY
Status: DISCONTINUED | OUTPATIENT
Start: 2024-12-24 | End: 2024-12-24

## 2024-12-24 RX ORDER — LIDOCAINE HYDROCHLORIDE 10 MG/ML
5 INJECTION, SOLUTION EPIDURAL; INFILTRATION; INTRACAUDAL; PERINEURAL
Status: COMPLETED | OUTPATIENT
Start: 2024-12-24 | End: 2024-12-24

## 2024-12-24 RX ORDER — OXYCODONE AND ACETAMINOPHEN 10; 325 MG/1; MG/1
1 TABLET ORAL EVERY 6 HOURS PRN
Status: DISCONTINUED | OUTPATIENT
Start: 2024-12-24 | End: 2024-12-28 | Stop reason: HOSPADM

## 2024-12-24 RX ORDER — CEFEPIME HYDROCHLORIDE 2 G/1
2 INJECTION, POWDER, FOR SOLUTION INTRAVENOUS
Status: DISCONTINUED | OUTPATIENT
Start: 2024-12-25 | End: 2024-12-25

## 2024-12-24 RX ORDER — SODIUM CHLORIDE 0.9 % (FLUSH) 0.9 %
10 SYRINGE (ML) INJECTION EVERY 6 HOURS PRN
Status: DISCONTINUED | OUTPATIENT
Start: 2024-12-24 | End: 2024-12-28 | Stop reason: HOSPADM

## 2024-12-24 RX ORDER — NALOXONE HCL 0.4 MG/ML
0.02 VIAL (ML) INJECTION
Status: DISCONTINUED | OUTPATIENT
Start: 2024-12-24 | End: 2024-12-28 | Stop reason: HOSPADM

## 2024-12-24 RX ORDER — ALUMINUM HYDROXIDE, MAGNESIUM HYDROXIDE, AND SIMETHICONE 1200; 120; 1200 MG/30ML; MG/30ML; MG/30ML
30 SUSPENSION ORAL 4 TIMES DAILY PRN
Status: DISCONTINUED | OUTPATIENT
Start: 2024-12-24 | End: 2024-12-28 | Stop reason: HOSPADM

## 2024-12-24 RX ORDER — TALC
6 POWDER (GRAM) TOPICAL NIGHTLY PRN
Status: DISCONTINUED | OUTPATIENT
Start: 2024-12-24 | End: 2024-12-28 | Stop reason: HOSPADM

## 2024-12-24 RX ORDER — ACETAMINOPHEN 325 MG/1
650 TABLET ORAL EVERY 6 HOURS PRN
Status: DISCONTINUED | OUTPATIENT
Start: 2024-12-24 | End: 2024-12-28 | Stop reason: HOSPADM

## 2024-12-24 RX ORDER — CEFEPIME HYDROCHLORIDE 2 G/1
2 INJECTION, POWDER, FOR SOLUTION INTRAVENOUS
Status: DISCONTINUED | OUTPATIENT
Start: 2024-12-24 | End: 2024-12-24

## 2024-12-24 RX ORDER — MEROPENEM 1 G/1
1 INJECTION, POWDER, FOR SOLUTION INTRAVENOUS
Status: COMPLETED | OUTPATIENT
Start: 2024-12-24 | End: 2024-12-24

## 2024-12-24 RX ORDER — LIDOCAINE HYDROCHLORIDE 10 MG/ML
INJECTION, SOLUTION INFILTRATION; PERINEURAL
Status: COMPLETED | OUTPATIENT
Start: 2024-12-24 | End: 2024-12-24

## 2024-12-24 RX ORDER — ONDANSETRON HYDROCHLORIDE 2 MG/ML
4 INJECTION, SOLUTION INTRAVENOUS EVERY 8 HOURS PRN
Status: DISCONTINUED | OUTPATIENT
Start: 2024-12-24 | End: 2024-12-28 | Stop reason: HOSPADM

## 2024-12-24 RX ORDER — VANCOMYCIN 1.75 GRAM/500 ML IN 0.9 % SODIUM CHLORIDE INTRAVENOUS
20
Status: COMPLETED | OUTPATIENT
Start: 2024-12-24 | End: 2024-12-24

## 2024-12-24 RX ADMIN — MEROPENEM 1 G: 1 INJECTION, POWDER, FOR SOLUTION INTRAVENOUS at 07:12

## 2024-12-24 RX ADMIN — CEFTRIAXONE SODIUM 1 G: 500 INJECTION, POWDER, FOR SOLUTION INTRAMUSCULAR; INTRAVENOUS at 05:12

## 2024-12-24 RX ADMIN — IOHEXOL 100 ML: 350 INJECTION, SOLUTION INTRAVENOUS at 03:12

## 2024-12-24 RX ADMIN — SODIUM CHLORIDE 500 ML: 0.9 INJECTION, SOLUTION INTRAVENOUS at 05:12

## 2024-12-24 RX ADMIN — LIDOCAINE HYDROCHLORIDE 50 MG: 10 INJECTION, SOLUTION EPIDURAL; INFILTRATION; INTRACAUDAL at 03:12

## 2024-12-24 RX ADMIN — IOHEXOL 6 ML: 300 INJECTION, SOLUTION INTRAVENOUS at 05:12

## 2024-12-24 RX ADMIN — ACETAMINOPHEN 1000 MG: 500 TABLET ORAL at 03:12

## 2024-12-24 RX ADMIN — SODIUM CHLORIDE, POTASSIUM CHLORIDE, SODIUM LACTATE AND CALCIUM CHLORIDE 1000 ML: 600; 310; 30; 20 INJECTION, SOLUTION INTRAVENOUS at 10:12

## 2024-12-24 RX ADMIN — OXYCODONE AND ACETAMINOPHEN 1 TABLET: 10; 325 TABLET ORAL at 10:12

## 2024-12-24 RX ADMIN — FENTANYL CITRATE 50 MCG: 50 INJECTION, SOLUTION INTRAMUSCULAR; INTRAVENOUS at 05:12

## 2024-12-24 RX ADMIN — VANCOMYCIN HYDROCHLORIDE 1750 MG: 10 INJECTION, POWDER, LYOPHILIZED, FOR SOLUTION INTRAVENOUS at 08:12

## 2024-12-24 RX ADMIN — LIDOCAINE HYDROCHLORIDE 5 ML: 10 INJECTION, SOLUTION INFILTRATION; PERINEURAL at 05:12

## 2024-12-24 NOTE — BRIEF OP NOTE
Radiology Post-Procedure Note    Pre Op Diagnosis: tube malfunction    Post Op Diagnosis: same    Procedure: PCN exchange     Procedure performed by: Susan Cueva MD    Written Informed Consent Obtained: Yes      Estimated Blood Loss: Minimal    Findings:   Right PCN exchange.     Patient tolerated procedure well.    Keep to bag drainage. Patient should return in 10-12 weeks for routine exchange.     Susan Cueva MD  Interventional Radiology

## 2024-12-24 NOTE — FIRST PROVIDER EVALUATION
Emergency Department TeleTriage Encounter Note      CHIEF COMPLAINT    Chief Complaint   Patient presents with    nephrostomy tube     Bleeding at tube site on the right side       VITAL SIGNS   Initial Vitals [12/24/24 1316]   BP Pulse Resp Temp SpO2   138/68 88 16 98.4 °F (36.9 °C) 99 %      MAP       --            ALLERGIES    Review of patient's allergies indicates:   Allergen Reactions    Bee sting [allergen ext-venom-honey bee]      Rash      Grass pollen-bermuda, standard      rash       PROVIDER TRIAGE NOTE  Verbal consent for the teletriage evaluation was given by the patient at the start of the evaluation.  All efforts will be made to maintain patient's privacy during the evaluation.      This is a teletriage evaluation of a 59 y.o. female presenting to the ED with c/o bleeding in nephrostomy bag that started this AM; reports still draining. Limited physical exam via telehealth: The patient is awake, alert, answering questions appropriately and is not in respiratory distress.  As the Teletriage provider, I performed an initial assessment and ordered appropriate labs and imaging studies, if any, to facilitate the patient's care once placed in the ED. Once a room is available, care and a full evaluation will be completed by an alternate ED provider.  Any additional orders and the final disposition will be determined by that provider.  All imaging and labs will not be followed-up by the Teletriage Team, including myself.          ORDERS  Labs Reviewed   CBC W/ AUTO DIFFERENTIAL   COMPREHENSIVE METABOLIC PANEL       ED Orders (720h ago, onward)      Start Ordered     Status Ordering Provider    12/24/24 1433 12/24/24 1433  Saline lock IV  Once         Ordered DESTINY MACIAS    12/24/24 1433 12/24/24 1433  CBC auto differential  STAT         Ordered DESTINY MACIAS    12/24/24 1433 12/24/24 1433  Comprehensive metabolic panel  STAT         Ordered DESTINY MACIAS    Unscheduled 12/24/24 1433  Urinalysis, Reflex to  Urine Culture Urine, Clean Catch  STAT         Ordered DESTINY MACIAS A              Virtual Visit Note: The provider triage portion of this emergency department evaluation and documentation was performed via [employer], a HIPAA-compliant telemedicine application, in concert with a tele-presenter in the room. A face to face patient evaluation with one of my colleagues will occur once the patient is placed in an emergency department room.      DISCLAIMER: This note was prepared with [employer] voice recognition transcription software. Garbled syntax, mangled pronouns, and other bizarre constructions may be attributed to that software system.

## 2024-12-24 NOTE — DISCHARGE INSTRUCTIONS
Seek medical attention for worsening right flank pain, or developing chest pain, shortness of breath, palpitations, abdominal pain, nausea/vomiting, fevers/chills.     Avoid tobacco products, alcohol, illicit substances.    Cont antibiotics, last day 01/02/2025.    Oxycodone is a sedating medication so do not operate heavy machinery when using and try to avoid combining with other sedating meds.    B12 was low normal, prescription sent to pharmacy for supplementation.    Iron and oxycodone can have a side-effect of constipation.  Recommend increasing fiber and taking laxative and/or stool softener with them.  If constipation persists, can decrease dose of iron to once daily or even once every other day.    Blood pressure low throughout admission.  Check your blood pressure daily before you take amlodipine. Write down blood pressure in a journal. Bring this journal to primary care physician for further medication adjustments.  Hold amlodipine for systolic blood pressure < 120.  Follow up with PCP for further medication recs.    Follow up with Urology next scheduled appt.    Follow up with Interventional Radiology at next scheduled appt.    Follow-up with PCP within 1-2 weeks. It is important to note that each of these medications that I have prescribed typically will not have refills. This is so your primary care physician or other specialists in the outpatient setting can review your progress and determine if these medications are to be continued. If they determine the medications need to be continued, make sure that you remind them about refills at your follow-up appointments.

## 2024-12-24 NOTE — SEDATION DOCUMENTATION
Pt arrived to 189 for neph tube replacement. Pt oriented to unit and staff. Plan of care reviewed with patient, patient verbalizes understanding. Comfort measures utilized. Pt safely transferred from stretcher to procedural table. Fall risk reviewed with patient, fall risk interventions maintained. Safety strap applied, positioner pillows utilized to minimize pressure points. Blankets applied. Pt prepped and draped utilizing standard sterile technique. Patient placed on continuous monitoring, as required by sedation policy. Timeouts completed utilizing standard universal time-out, per department and facility policy. RN to remain at bedside, continuous monitoring maintained. Pt resting comfortably. Denies pain/discomfort. Will continue to monitor. See flow sheets for monitoring, medication administration, and updates.

## 2024-12-24 NOTE — SEDATION DOCUMENTATION
The stopcock is in the open position and urine is free flowing.  The stopcock MUST stay in the open position, do not close or turn off.   If for any reason the urine is NOT free flowing, please contact us immediately at [phone] and ask for IR PHYSICIAN ON CALL.  Discharge instructions have been reviewed and acknowledged by patient and/or family/friend.

## 2024-12-24 NOTE — CONSULTS
Inpatient Radiology Consult/HPI Note    History of Present Illness:  Edita Riley is a 59 y.o. female who presents for Nephrostomy tube exchange.  Admission H&P reviewed.  Past Medical History:   Diagnosis Date    Abnormal mammogram 08/25/2020    Abnormal mammogram 08/25/2020    Acute blood loss anemia     Acute deep vein thrombosis (DVT) of lower extremity 12/09/2020    Advance care planning 04/30/2021    ODESSA (acute kidney injury) 03/21/2020    Anemia due to chronic blood loss     Anemia due to chronic kidney disease     Anemia due to chronic kidney disease 05/18/2020    Anxiety     Bilateral ureteral obstruction 09/11/2020    Bilious vomiting with nausea 06/11/2023    Cardiovascular event risk -- low 09/14/2015    ASCVD 10-year risk 1.9% (with optimal risk factors 1.3%) as of 9/14/2015     Cervical cancer 2014    Chronic back pain     Colostomy care     Deep vein thrombosis     Depression     Diarrhea due to malabsorption 11/14/2018    Difficult intubation     Discharge planning issues 04/30/2021    Disorder of kidney and ureter     DVT of lower extremity, bilateral 11/04/2020    Edema 04/10/2023    Emphysema of lung 04/10/2023    Fibromyalgia     Fungemia 09/27/2020    Generalized abdominal pain 08/25/2020    GERD (gastroesophageal reflux disease)     Hemifacial spasm 09/16/2015    Hiatal hernia 2014    History of cervical cancer 10/11/2018    History of essential hypertension 05/04/2015    Not requiring medications at this time  BP is low- concern that this may correlate with increased stool output from stoma. Encourage her to contact GI and her PCP.    Hx of psychiatric care     Cymbalta, trazodone    Hypertension     Hypomagnesemia 11/21/2018    Hyponatremia 09/10/2023    Impaired functional mobility, balance, gait, and endurance 07/24/2015    Lactose intolerance     Major depressive disorder, recurrent episode, moderate 06/02/2023    Metastatic squamous cell carcinoma to lymph node 10/11/2018     Moderate episode of recurrent major depressive disorder 04/21/2021    Nephrostomy complication 10/26/2023    Neuropathy due to chemotherapeutic drug 11/14/2018    Osteoarthritis of back     Peritonitis 09/22/2020    Physical deconditioning 04/30/2021    Pseudomonas urinary tract infection 04/21/2021    Psychiatric problem     Pyelitis 09/09/2023    QT prolongation 04/16/2021    Refusal of blood transfusions as patient is [employer]     Schatzki's ring 09/14/2015    Seen on outside EGD 05/2014, underwent esophageal dilatation. Bx were negative.     Seizure-like activity 12/02/2018    Seizures     Sleep stage dysfunction     Noted on PSG 06/2017; negative for obstructive sleep apnea     Stroke     Urinary tract infection associated with nephrostomy catheter 06/11/2020    Wound infection after surgery 09/24/2020     Past Surgical History:   Procedure Laterality Date    ANASTOMOSIS OF INTESTINE N/A 2/6/2024    Procedure: ANASTOMOSIS, INTESTINE - Ileocolic anastomosis;  Surgeon: Hammad Reynolds MD;  [facility];  Service: Colon and Rectal;  Laterality: N/A;    ANTEGRADE NEPHROSTOGRAPHY Bilateral 9/28/2020    Procedure: Nephrostogram - antegrade;  Surgeon: Leslie Balbuena MD;  Location: [facility] OR 1ST FLR;  Service: Urology;  Laterality: Bilateral;    ANTEGRADE NEPHROSTOGRAPHY Left 4/20/2021    Procedure: NEPHROSTOGRAM, ANTEGRADE;  Surgeon: Leslie Balbuena MD;  Location: [facility] OR 1ST FLR;  Service: Urology;  Laterality: Left;    ANTEGRADE NEPHROSTOGRAPHY Right 10/27/2022    Procedure: NEPHROSTOGRAM, ANTEGRADE;  Surgeon: Chirag Russ MD;  Location: [facility] OR 1ST FLR;  Service: Urology;  Laterality: Right;    ANTEGRADE NEPHROSTOGRAPHY Right 4/21/2023    Procedure: NEPHROSTOGRAM, ANTEGRADE;  Surgeon: Chirag Russ MD;  Location: [facility] OR 2ND FLR;  Service: Urology;  Laterality: Right;    ANTEGRADE NEPHROSTOGRAPHY Left 6/6/2023    Procedure: Nephrostogram - antegrade;  Surgeon: Leslie Balbuena  MD;  Location: [facility] OR 1ST FLR;  Service: Urology;  Laterality: Left;    BILATERAL OOPHORECTOMY  2015    CHOLECYSTECTOMY  11/09/2016    Done at [facility], path showed chronic cholecystitis and gallstones    cold knife conization  2014    COLECTOMY, RIGHT  9/17/2020    Procedure: COLECTOMY, RIGHT Extended;  Surgeon: Hammad Reynolds MD;  Location: [facility] OR 2ND FLR;  Service: Colon and Rectal;;    COLONOSCOPY  2014    [facility]    COLONOSCOPY N/A 5/18/2018    Procedure: COLONOSCOPY;  Surgeon: Arden Gutiérrez MD;  Location: [facility] (4TH FLR);  Service: Endoscopy;  Laterality: N/A;    COLONOSCOPY N/A 7/28/2020    Procedure: COLONOSCOPY;  Surgeon: Hammad Reynolds MD;  Location: [facility] (4TH FLR);  Service: Colon and Rectal;  Laterality: N/A;  covid test [place] 7/25    CYSTOSCOPY WITH URETEROSCOPY, RETROGRADE PYELOGRAPHY, AND INSERTION OF STENT Bilateral 3/21/2020    Procedure: CYSTOSCOPY, WITH RETROGRADE PYELOGRAM,;  Surgeon: Leslie Balbuena MD;  [facility];  Service: Urology;  Laterality: Bilateral;    DILATION OF NEPHROSTOMY TRACT Right 10/27/2022    Procedure: DILATION, NEPHROSTOMY TRACT;  Surgeon: Chirag Russ MD;  [facility];  Service: Urology;  Laterality: Right;    ESOPHAGOGASTRODUODENOSCOPY  2014    ESOPHAGOGASTRODUODENOSCOPY  11/18/2020    ESOPHAGOGASTRODUODENOSCOPY N/A 11/18/2020    Procedure: ESOPHAGOGASTRODUODENOSCOPY (EGD);  Surgeon: Zenon Spencer MD;  Location: [facility];  Service: Endoscopy;  Laterality: N/A;    ESOPHAGOGASTRODUODENOSCOPY N/A 12/11/2020    Procedure: EGD (ESOPHAGOGASTRODUODENOSCOPY);  Surgeon: Juancho Muse MD;  Location: [facility] (2ND FLR);  Service: Endoscopy;  Laterality: N/A;    EXCISION, SMALL INTESTINE  2/6/2024    Procedure: EXCISION, SMALL INTESTINE;  Surgeon: Hammad Reynolds MD;  Location: [facility] OR 2ND FLR;  Service: Colon and Rectal;;    HYSTERECTOMY  2014    [facility] for cervical cancer     ILEOSTOMY  9/17/2020    Procedure: CREATION, ILEOSTOMY;  Surgeon: Hammad Reynolds MD;  [facility] OR 2ND FLR;  Service: Colon and Rectal;;    ILEOSTOMY CLOSURE N/A 2/6/2024    Procedure: CLOSURE, ILEOSTOMY;  Surgeon: Hammad Reynolds MD;  Location: [facility] OR 2ND FLR;  Service: Colon and Rectal;  Laterality: N/A;    LAPAROTOMY, EXPLORATORY N/A 2/6/2024    Procedure: OPEN LAPAROTOMY, EXPLORATORY;  Surgeon: Leslie Balbuena MD;  Location: [facility] OR 2ND FLR;  Service: Urology;  Laterality: N/A;  22 modifier    LOOPOGRAM N/A 4/20/2021    Procedure: LOOPOGRAM;  Surgeon: Leslie Balbuena MD;  Location: [facility] OR 1ST FLR;  Service: Urology;  Laterality: N/A;    LOOPOGRAM N/A 6/6/2023    Procedure: LOOPOGRAM;  Surgeon: Leslie Balbuena MD;  Location: [facility] OR 1ST FLR;  Service: Urology;  Laterality: N/A;    LYSIS OF ADHESIONS  2/6/2024    Procedure: LYSIS, ADHESIONS;  Surgeon: Leslie Balbuena MD;  Location: [facility] OR 2ND FLR;  Service: Urology;;    LYSIS OF ADHESIONS  2/6/2024    Procedure: LYSIS, ADHESIONS;  Surgeon: Hammad Reynolds MD;  Location: [facility] OR 2ND FLR;  Service: Colon and Rectal;;    MOBILIZATION OF SPLENIC FLEXURE N/A 9/11/2020    Procedure: MOBILIZATION, COLONIC;  Surgeon: Hammad Reynolds MD;  Location: [facility] OR 2ND FLR;  Service: Colon and Rectal;  Laterality: N/A;    NEPHROSTOGRAPHY Bilateral 4/17/2021    Procedure: Nephrostogram;  Surgeon: Celeste Surgeon;  Location: [facility];  Service: Anesthesiology;  Laterality: Bilateral;    OOPHORECTOMY      PERCUTANEOUS NEPHROLITHOTOMY Right 4/21/2023    Procedure: NEPHROLITHOTOMY, PERCUTANEOUS;  Surgeon: Chirag Russ MD;  Location: [facility] OR 2ND FLR;  Service: Urology;  Laterality: Right;  2.5 hrs    PERCUTANEOUS NEPHROSTOMY  4/21/2023    Procedure: CREATION, NEPHROSTOMY, PERCUTANEOUS with removal of existing nephrostomy tube;  Surgeon: Chirag Russ MD;  Location: [facility] OR [address] [place] [address];  Service: Urology;;    PYELOSCOPY Right 10/27/2022    Procedure: PYELOSCOPY;   Surgeon: Chirag Russ MD;  Location: [facility] OR 1ST FLR;  Service: Urology;  Laterality: Right;    REPLACEMENT OF NEPHROSTOMY TUBE Right 10/27/2022    Procedure: REPLACEMENT, NEPHROSTOMY TUBE;  Surgeon: Chirag Russ MD;  Location: [facility] OR 1ST FLR;  Service: Urology;  Laterality: Right;    RETROPERITONEAL LYMPHADENECTOMY Right 9/17/2018    Procedure: LYMPHADENECTOMY, RETROPERITONEUM;  Surgeon: Sebas Reed MD;  Location: [facility] OR 2ND FLR;  Service: General;  Laterality: Right;  ILIAC    REVISION OF ANASTOMOSIS OF URETER TO ILEAL CONDUIT N/A 2/6/2024    Procedure: REVISION, ANASTOMOSIS, URETEROILEAL;  Surgeon: Leslie Balbuena MD;  Location: [facility] OR 2ND FLR;  Service: Urology;  Laterality: N/A;    URETEROSCOPIC REMOVAL OF URETERIC CALCULUS Right 10/27/2022    Procedure: REMOVAL, CALCULUS, URETER, URETEROSCOPIC;  Surgeon: Chirag Russ MD;  [facility];  Service: Urology;  Laterality: Right;    URETEROSCOPY Right 10/27/2022    Procedure: URETEROSCOPY-ANTEGRADE;  Surgeon: Chirag Russ MD;  [facility];  Service: Urology;  Laterality: Right;       Review of Systems:   As documented in primary team H&P    Home Meds:   Prior to Admission medications    Medication Sig Start Date End Date Taking? Authorizing Provider   acetaminophen (TYLENOL) 500 MG tablet Take 1 tablet (500 mg total) by mouth every 6 (six) hours as needed for Pain. 2/18/24   Dave Tatum MD   amLODIPine (NORVASC) 5 MG tablet Take 1 tablet (5 mg total) by mouth once daily. 8/13/24 8/13/25  Brittney Bolton MD   ferrous sulfate (FEOSOL) 325 mg (65 mg iron) Tab tablet TAKE 1 TABLET BY MOUTH TWICE A DAY 3/12/24   Leslie Balbuena MD   loratadine (CLARITIN) 10 mg tablet Take 10 mg by mouth daily as needed for Allergies. 9/5/23   Provider, Historical   mirtazapine (REMERON) 30 MG tablet Take 1 tablet (30 mg total) by mouth nightly. 11/6/23 11/5/24  Andriy Coley MD   pantoprazole (PROTONIX) 40 MG  "tablet Take 1 tablet (40 mg total) by mouth once daily. for 14 days 8/13/24 9/23/24  Brittney Bolton MD     Scheduled Meds:   Continuous Infusions:   PRN Meds:  Anticoagulants/Antiplatelets: no anticoagulation    Allergies:   Review of patient's allergies indicates:   Allergen Reactions    Bee sting [allergen ext-venom-honey bee]      Rash      Grass pollen-bermuda, standard      rash     Sedation Hx: have not been any systemic reactions    Labs:  No results for input(s): "INR", "PT", "PTT" in the last 168 hours.    Recent Labs   Lab 12/24/24  1443   WBC 7.98   HGB 12.5   HCT 40.9   MCV 90         Recent Labs   Lab 12/24/24  1443   GLU 86      K 4.4      CO2 22*   BUN 15   CREATININE 1.3   CALCIUM 9.6   ALT 19   AST 22   ALBUMIN 3.4*   BILITOT 0.3         Vitals:  Temp: 98.4 °F (36.9 °C) (12/24/24 1457)  Pulse: 73 (12/24/24 1457)  Resp: 17 (12/24/24 1457)  BP: 121/65 (12/24/24 1457)  SpO2: 99 % (12/24/24 1457)     Physical Exam:  ASA: 2  Mallampati: 2    General: no acute distress  Mental Status: alert and oriented to person, place and time  HEENT: normocephalic, atraumatic  Chest: unlabored breathing  Heart: regular heart rate  Abdomen: nondistended  Extremity: moves all extremities    Plan: Image guided nephrostomy tube exchange  Sedation Plan: local    Andre Ware  Diagnostic Radiology PGY-2   "

## 2024-12-24 NOTE — ED NOTES
Assumed care of the patient.Pt on continuous cardiac monitoring, continuous pulse oximetry, and automatic BP cuff cycling Q15min. Pt in hospital gown, side rails up X2, bed low and locked, and call light is placed within reach. No family/visitors at bedside at this time. Pt denies any complaints or needs.

## 2024-12-24 NOTE — ED TRIAGE NOTES
Edita Riley, a 59 y.o. female presents to the ED w/ complaint of nephrostomy tube problem    Patient c/o bleeding at nephrostomy tube site on the right side. Endorses hematuria that started today. Denies chest pain and SOB. Denies nausea/vomiting. Endorses chronic back pain. Patient states she has a kidney stone.     Triage note:  Chief Complaint   Patient presents with    nephrostomy tube     Bleeding at tube site on the right side     Review of patient's allergies indicates:   Allergen Reactions    Bee sting [allergen ext-venom-honey bee]      Rash      Grass pollen-bermuda, standard      rash     Past Medical History:   Diagnosis Date    Abnormal mammogram 08/25/2020    Abnormal mammogram 08/25/2020    Acute blood loss anemia     Acute deep vein thrombosis (DVT) of lower extremity 12/09/2020    Advance care planning 04/30/2021    ODESSA (acute kidney injury) 03/21/2020    Anemia due to chronic blood loss     Anemia due to chronic kidney disease     Anemia due to chronic kidney disease 05/18/2020    Anxiety     Bilateral ureteral obstruction 09/11/2020    Bilious vomiting with nausea 06/11/2023    Cardiovascular event risk -- low 09/14/2015    ASCVD 10-year risk 1.9% (with optimal risk factors 1.3%) as of 9/14/2015     Cervical cancer 2014    Chronic back pain     Colostomy care     Deep vein thrombosis     Depression     Diarrhea due to malabsorption 11/14/2018    Difficult intubation     Discharge planning issues 04/30/2021    Disorder of kidney and ureter     DVT of lower extremity, bilateral 11/04/2020    Edema 04/10/2023    Emphysema of lung 04/10/2023    Fibromyalgia     Fungemia 09/27/2020    Generalized abdominal pain 08/25/2020    GERD (gastroesophageal reflux disease)     Hemifacial spasm 09/16/2015    Hiatal hernia 2014    History of cervical cancer 10/11/2018    History of essential hypertension 05/04/2015    Not requiring medications at this time  BP is low- concern that this may correlate  with increased stool output from stoma. Encourage her to contact GI and her PCP.    Hx of psychiatric care     Cymbalta, trazodone    Hypertension     Hypomagnesemia 11/21/2018    Hyponatremia 09/10/2023    Impaired functional mobility, balance, gait, and endurance 07/24/2015    Lactose intolerance     Major depressive disorder, recurrent episode, moderate 06/02/2023    Metastatic squamous cell carcinoma to lymph node 10/11/2018    Moderate episode of recurrent major depressive disorder 04/21/2021    Nephrostomy complication 10/26/2023    Neuropathy due to chemotherapeutic drug 11/14/2018    Osteoarthritis of back     Peritonitis 09/22/2020    Physical deconditioning 04/30/2021    Pseudomonas urinary tract infection 04/21/2021    Psychiatric problem     Pyelitis 09/09/2023    QT prolongation 04/16/2021    Refusal of blood transfusions as patient is Jain     Schaclaudioki's ring 09/14/2015    Seen on outside EGD 05/2014, underwent esophageal dilatation. Bx were negative.     Seizure-like activity 12/02/2018    Seizures     Sleep stage dysfunction     Noted on PSG 06/2017; negative for obstructive sleep apnea     Stroke     Urinary tract infection associated with nephrostomy catheter 06/11/2020    Wound infection after surgery 09/24/2020        [place]

## 2024-12-24 NOTE — ED PROVIDER NOTES
Encounter Date: 12/24/2024       History     Chief Complaint   Patient presents with    nephrostomy tube     Bleeding at tube site on the right side     HPI    59-year-old female with past medical history as noted below:    PMHx of HTN, history of seizure disorder not on AEDs, Schatzki's ring /GERD, history of BLE DVTs, chronic anemia, anxiety/depression, fibromyalgia and chronic back pain, and history of pelvic radiation therapy for cervical cancer which was complicated by bilateral distal ureteral obstruction with bilateral hydronephrosis which was complicated by bowel perforation with colo-colonic anastomotic leak and peritonitis following colonic urinary conduit creation, s/p extended right colectomy with end ileostomy and left ureter-colonic conduit anastomotic stricture of ureteral implant to transverse colon conduit on 2/6/2024 with lysis of adhesions and takedown of ileostomy with current urinary ileostomy and a left percutaneous nephrostomy.     She is coming in today because she noticed some red/brown colored urine in her right nephrostomy tube bag.  She also has noticed some bleeding at the site of her nephrostomy tube insertion.  She denies any falls or trauma she denies pulling on the tube.  She has chronic back pain at the site of her catheter as well as chronic abdominal pain which is unchanged from baseline.  No fevers.  She has a left conduit for her urine output in her abdomen, which is outputting clear yellow urine.    Takes Tylenol for pain control has not had it today at all.    Review of patient's allergies indicates:   Allergen Reactions    Bee sting [allergen ext-venom-honey bee]      Rash      Grass pollen-bermuda, standard      rash     Past Medical History:   Diagnosis Date    Abnormal mammogram 08/25/2020    Anemia due to chronic blood loss     Anxiety     Bilateral ureteral obstruction 09/11/2020    obstructive uropathy now s/p transverse colon conduit, s/p right  Nephrostomy tube, s/p  Ileostomy,    Cervical cancer 2014    s/p XRT & Bilateral Ureteral strictures with obstructive uropathy    Chronic back pain     CKD (chronic kidney disease)     Colon polyp 09/14/2015    Diarrhea due to malabsorption 11/14/2018    Difficult intubation     DVT of lower extremity, bilateral 11/04/2020    Emphysema of lung 04/10/2023    Family history of colon cancer 07/28/2020    Fibromyalgia     Folate deficiency     Fungemia 09/27/2020    GERD (gastroesophageal reflux disease)     Hard to intubate 04/20/2021    Hemifacial spasm 09/16/2015    Hiatal hernia 2014    Hypertension     Hyponatremia 09/10/2023    Impaired functional mobility, balance, gait, and endurance 07/24/2015    Lactose intolerance     Major depressive disorder, recurrent episode, moderate 06/02/2023    History of suicidal ideation    Metastatic squamous cell carcinoma to lymph node 10/11/2018    Nephrostomy complication 10/26/2023    Neuropathy due to chemotherapeutic drug 11/14/2018    Osteoarthritis of back     Peritonitis 09/22/2020    QT prolongation 04/16/2021    Refusal of blood transfusions as patient is Baptism     Schatzki's ring 09/14/2015    Seen on outside EGD 05/2014, underwent esophageal dilatation. Bx were negative.     Seizure-like activity 12/02/2018    Sleep stage dysfunction     Noted on PSG 06/2017; negative for obstructive sleep apnea     Small bowel obstruction 08/19/2023    Stroke     Urinary tract infection associated with nephrostomy catheter 06/11/2020    recurrent MDR UTI/pyelonephritis     Past Surgical History:   Procedure Laterality Date    ANASTOMOSIS OF INTESTINE N/A 2/6/2024    Procedure: ANASTOMOSIS, INTESTINE - Ileocolic anastomosis;  Surgeon: Hammad Reynolds MD;  Location: [facility] OR 2ND FLR;  Service: Colon and Rectal;  Laterality: N/A;    ANTEGRADE NEPHROSTOGRAPHY Bilateral 9/28/2020    Procedure: Nephrostogram - antegrade;  Surgeon: Leslie Balbeuna MD;  Location: [facility] OR 1ST FLR;  Service: Urology;   Laterality: Bilateral;    ANTEGRADE NEPHROSTOGRAPHY Left 4/20/2021    Procedure: NEPHROSTOGRAM, ANTEGRADE;  Surgeon: Leslie Balbuena MD;  Location: [facility] OR 1ST FLR;  Service: Urology;  Laterality: Left;    ANTEGRADE NEPHROSTOGRAPHY Right 10/27/2022    Procedure: NEPHROSTOGRAM, ANTEGRADE;  Surgeon: Chirag Russ MD;  Location: [facility] OR 1ST FLR;  Service: Urology;  Laterality: Right;    ANTEGRADE NEPHROSTOGRAPHY Right 4/21/2023    Procedure: NEPHROSTOGRAM, ANTEGRADE;  Surgeon: Chirag Russ MD;  Location: [facility] OR 2ND FLR;  Service: Urology;  Laterality: Right;    ANTEGRADE NEPHROSTOGRAPHY Left 6/6/2023    Procedure: Nephrostogram - antegrade;  Surgeon: Leslie Balbuena MD;  Location: [facility] OR 1ST FLR;  Service: Urology;  Laterality: Left;    BILATERAL OOPHORECTOMY  2015    CHOLECYSTECTOMY  11/09/2016    Done at [facility], path showed chronic cholecystitis and gallstones    cold knife conization  2014    COLECTOMY, RIGHT  9/17/2020    Procedure: COLECTOMY, RIGHT Extended;  Surgeon: Hammad Reynolds MD;  Location: [facility] OR 2ND FLR;  Service: Colon and Rectal;;    COLONOSCOPY  2014    [facility]    COLONOSCOPY N/A 5/18/2018    Procedure: COLONOSCOPY;  Surgeon: Arden Gutiérrez MD;  Location: [facility] (4TH FLR);  Service: Endoscopy;  Laterality: N/A;    COLONOSCOPY N/A 7/28/2020    Procedure: COLONOSCOPY;  Surgeon: Hammad Reynolds MD;  Location: [facility] ENDO (4TH FLR);  Service: Colon and Rectal;  Laterality: N/A;  covid test [place] 7/25    CYSTOSCOPY WITH URETEROSCOPY, RETROGRADE PYELOGRAPHY, AND INSERTION OF STENT Bilateral 3/21/2020    Procedure: CYSTOSCOPY, WITH RETROGRADE PYELOGRAM,;  Surgeon: Leslie Balbuena MD;  Location: [facility] OR 1ST FLR;  Service: Urology;  Laterality: Bilateral;    DILATION OF NEPHROSTOMY TRACT Right 10/27/2022    Procedure: DILATION, NEPHROSTOMY TRACT;  Surgeon: Chirag Russ MD;  [facility];  Service: Urology;  Laterality:  Right;    ESOPHAGOGASTRODUODENOSCOPY  2014    ESOPHAGOGASTRODUODENOSCOPY  11/18/2020    ESOPHAGOGASTRODUODENOSCOPY N/A 11/18/2020    Procedure: ESOPHAGOGASTRODUODENOSCOPY (EGD);  Surgeon: Zenon Spencer MD;  [facility]: [facility] ENDO;  Service: Endoscopy;  Laterality: N/A;    ESOPHAGOGASTRODUODENOSCOPY N/A 12/11/2020    Procedure: EGD (ESOPHAGOGASTRODUODENOSCOPY);  Surgeon: Juancho Muse MD;  Location: [facility] ENDO (2ND FLR);  Service: Endoscopy;  Laterality: N/A;    EXCISION, SMALL INTESTINE  2/6/2024    Procedure: EXCISION, SMALL INTESTINE;  Surgeon: Hammad Reynolds MD;  Location: [facility] OR 2ND FLR;  Service: Colon and Rectal;;    HYSTERECTOMY  2014    [facility] for cervical cancer    ILEOSTOMY  9/17/2020    Procedure: CREATION, ILEOSTOMY;  Surgeon: Hammad Reynolds MD;  Location: [facility] OR 2ND FLR;  Service: Colon and Rectal;;    ILEOSTOMY CLOSURE N/A 2/6/2024    Procedure: CLOSURE, ILEOSTOMY;  Surgeon: Hammad Reynolds MD;  Location: [facility] OR 2ND FLR;  Service: Colon and Rectal;  Laterality: N/A;    LAPAROTOMY, EXPLORATORY N/A 2/6/2024    Procedure: OPEN LAPAROTOMY, EXPLORATORY;  Surgeon: Leslie Balbuena MD;  Location: [facility] OR 2ND FLR;  Service: Urology;  Laterality: N/A;  22 modifier    LOOPOGRAM N/A 4/20/2021    Procedure: LOOPOGRAM;  Surgeon: Leslie Balbuena MD;  Location: [facility] OR 1ST FLR;  Service: Urology;  Laterality: N/A;    LOOPOGRAM N/A 6/6/2023    Procedure: LOOPOGRAM;  Surgeon: Leslie Balbuena MD;  Location: [facility] OR 1ST FLR;  Service: Urology;  Laterality: N/A;    LYSIS OF ADHESIONS  2/6/2024    Procedure: LYSIS, ADHESIONS;  Surgeon: Leslie Balbuena MD;  Location: [facility] OR 2ND FLR;  Service: Urology;;    LYSIS OF ADHESIONS  2/6/2024    Procedure: LYSIS, ADHESIONS;  Surgeon: Hammad Reynolds MD;  Location: [facility] OR 2ND FLR;  Service: Colon and Rectal;;    MOBILIZATION OF SPLENIC FLEXURE N/A 9/11/2020    Procedure: MOBILIZATION, COLONIC;  Surgeon: Hammad Reynolds MD;  Location: [facility] OR 2ND FLR;  Service:  Colon and Rectal;  Laterality: N/A;    NEPHROSTOGRAPHY Bilateral 4/17/2021    Procedure: Nephrostogram;  Surgeon: Celeste Surgeon;  Location: [facility];  Service: Anesthesiology;  Laterality: Bilateral;    OOPHORECTOMY      PERCUTANEOUS NEPHROLITHOTOMY Right 4/21/2023    Procedure: NEPHROLITHOTOMY, PERCUTANEOUS;  Surgeon: Chirag Russ MD;  Location: [facility] OR 2ND FLR;  Service: Urology;  Laterality: Right;  2.5 hrs    PERCUTANEOUS NEPHROSTOMY  4/21/2023    Procedure: CREATION, NEPHROSTOMY, PERCUTANEOUS with removal of existing nephrostomy tube;  Surgeon: Chirag Russ MD;  [facility];  Service: Urology;;    PYELOSCOPY Right 10/27/2022    Procedure: PYELOSCOPY;  Surgeon: Chirag Russ MD;  [facility];  Service: Urology;  Laterality: Right;    REPLACEMENT OF NEPHROSTOMY TUBE Right 10/27/2022    Procedure: REPLACEMENT, NEPHROSTOMY TUBE;  Surgeon: Chirag Russ MD;  Location: [facility] OR [address] [place] [address];  Service: Urology;  Laterality: Right;    RETROPERITONEAL LYMPHADENECTOMY Right 9/17/2018    Procedure: LYMPHADENECTOMY, RETROPERITONEUM;  Surgeon: Sebas Reed MD;  Location: [facility] OR [address] [place] [address];  Service: General;  Laterality: Right;  ILIAC    REVISION OF ANASTOMOSIS OF URETER TO ILEAL CONDUIT N/A 2/6/2024    Procedure: REVISION, ANASTOMOSIS, URETEROILEAL;  Surgeon: Leslie Balbuena MD;  [facility];  Service: Urology;  Laterality: N/A;    URETEROSCOPIC REMOVAL OF URETERIC CALCULUS Right 10/27/2022    Procedure: REMOVAL, CALCULUS, URETER, URETEROSCOPIC;  Surgeon: Chirag Russ MD;  [facility];  Service: Urology;  Laterality: Right;    URETEROSCOPY Right 10/27/2022    Procedure: URETEROSCOPY-ANTEGRADE;  Surgeon: Chirag Russ MD;  [facility];  Service: Urology;  Laterality: Right;     Family History   Problem Relation Name Age of Onset    Heart disease Sister      Heart disease Maternal Grandmother      Colon cancer Father       Esophageal cancer Father      Cancer Father          Lung-smoker    Cancer Mother          Cervical    Cervical cancer Mother      Breast cancer Maternal Aunt      Suicide Daughter Gayla         jumped from parking structure    Drug abuse Daughter Gayla     Drug abuse Daughter Hailey     Rectal cancer Neg Hx      Stomach cancer Neg Hx      Crohn's disease Neg Hx      Ulcerative colitis Neg Hx      Diabetes Neg Hx      Hypertension Neg Hx       Social History     Tobacco Use    Smoking status: Never    Smokeless tobacco: Never   Substance Use Topics    Alcohol use: No     Alcohol/week: 0.0 standard drinks of alcohol    Drug use: No     Review of Systems    Physical Exam     Initial Vitals [12/24/24 1316]   BP Pulse Resp Temp SpO2   138/68 88 16 98.4 °F (36.9 °C) 99 %      MAP       --         Physical Exam    Physical Exam:  CONSTITUTIONAL: Well developed, well nourished, in no acute distress.  HENT: Normocephalic, atraumatic    EYES: Sclerae anicteric   NECK: Supple, no thyroid enlargement  CARDIOVASCULAR: Regular rate and rhythm without any murmurs, gallops, rubs.  RESPIRATORY: Speaking in full sentences. Breathing comfortably. Auscultation of the lungs revealed normal breath sounds b/l, no wheezing, no rales, no rhonchi.  ABDOMEN: Soft, some mild tenderness more on the right than on the left, no masses, no rebound or guarding   NEUROLOGIC: Alert, interacting normally. No facial droop. Voice is clear. Speech is fluent.  MSK: Moving all four extremities.  SKIN:  The right back nephrostomy tube suture is out of the skin.  Tube appears to be out about 1 cm small amount of oozing from this site.  No surrounding erythema.  No purulence.  Warm and dry. No visible rash on exposed areas of skin.    Psych: Mood and affect normal.           Urine in the right nephrostomy tube is brown.         ED Course   Procedures  Labs Reviewed   CBC W/ AUTO DIFFERENTIAL - Abnormal       Result Value    WBC 7.98      RBC 4.55       Hemoglobin 12.5      Hematocrit 40.9      MCV 90      MCH 27.5      MCHC 30.6 (*)     RDW 15.3 (*)     Platelets 245      MPV 10.6      Immature Granulocytes 0.1      Gran # (ANC) 4.6      Immature Grans (Abs) 0.01      Lymph # 2.2      Mono # 0.9      Eos # 0.2      Baso # 0.04      nRBC 0      Gran % 58.0      Lymph % 27.9      Mono % 11.5      Eosinophil % 2.0      Basophil % 0.5      Differential Method Automated     COMPREHENSIVE METABOLIC PANEL - Abnormal    Sodium 142      Potassium 4.4      Chloride 109      CO2 22 (*)     Glucose 86      BUN 15      Creatinine 1.3      Calcium 9.6      Total Protein 7.9      Albumin 3.4 (*)     Total Bilirubin 0.3      Alkaline Phosphatase 117      AST 22      ALT 19      eGFR 46.8 (*)     Anion Gap 11     URINALYSIS, REFLEX TO URINE CULTURE - Abnormal    Specimen UA Urine, Unspecified      Color, UA Orange (*)     Appearance, UA Hazy (*)     pH, UA 6.0      Specific Gravity, UA >=1.030 (*)     Protein, UA 2+ (*)     Glucose, UA Negative      Ketones, UA Negative      Bilirubin (UA) Negative      Occult Blood UA 3+ (*)     Nitrite, UA Negative      Leukocytes, UA 2+ (*)     Narrative:     Specimen Source->Urine   URINALYSIS MICROSCOPIC - Abnormal    RBC, UA >100 (*)     WBC, UA >100 (*)     Bacteria Occasional      Squam Epithel, UA 1      Hyaline Casts, UA 0      Microscopic Comment SEE COMMENT      Narrative:     Specimen Source->Urine   CULTURE, URINE          Imaging Results              IR Nephrostomy Tube Change (In process)                       CT Abdomen Pelvis With IV Contrast NO Oral Contrast (Final result)  Result time 12/24/24 16:19:35      Final result by Adilson Gerard MD (12/24/24 16:19:35)                   Impression:      1. Findings suspicious for bilateral urinary tract infection.  See above comments.  Follow-up recommended.  2. Nonobstructing nephrolithiasis at the lower pole the left kidney.  3. Nephrostomy tube on the right.  4. Hepatomegaly with  hepatic steatosis.  5. Postoperative changes.  6. This report was flagged in Epic as abnormal.      Electronically signed by: Adilson Reinoso  Date:    12/24/2024  Time:    16:19               Narrative:    EXAMINATION:  CT ABDOMEN PELVIS WITH IV CONTRAST    CLINICAL HISTORY:  Nephrostomy catheter displacement;Dark urine in the nephrostomy tube and slight displacement.  Suture seems to have popped came out about 1 cm.;    TECHNIQUE:  Low dose axial images, sagittal and coronal reformations were obtained from the lung bases to the pubic symphysis following the IV administration of 100 mL of Omnipaque 350 .  Oral contrast was not administered.    COMPARISON:  08/09/2024    FINDINGS:  Abdomen:    - Lower thorax:    - Lung bases: No infiltrates and no nodules.    - Liver: The liver is mildly enlarged.  Mild diffuse fatty infiltration.  No focal abnormality.    - Gallbladder: No calcified gallstones.    - Bile Ducts: No evidence of intra or extra hepatic biliary ductal dilation.    - Spleen: Negative.    - Kidneys: The kidneys appear normal in size.  Percutaneous nephrostomy tube at the lower pole the right kidney.  There is mild inflammatory stranding of the right renal pelvis and right ureter could be associated with urinary tract infection.  Bilateral ureteral diversion procedure extending to a pouch near the midline.  Stable minimal anastomotic calcification at the junction of the right ureter and ileocolic anastomosis.    There is similar mild inflammatory stranding and thickening of the left renal pelvis and proximal left ureter.  Bilateral ureteral diversion procedure.  A urinary tract infection is a consideration.  Follow-up recommended.    No hydronephrosis bilaterally.  Nonobstructing stones at the lower pole of the left kidney.    Follow-up recommended.    - Adrenals: Unremarkable.    - Pancreas: No mass or peripancreatic fat stranding.    - Retroperitoneum:  Few subcentimeter periaortic retroperitoneal lymph  nodes.    - Vascular: No abdominal aortic aneurysm.    - Abdominal wall:  Left lower quadrant urostomy.    Postop changes of small and large bowel in the pelvis.    Pelvis:    No pelvic mass, adenopathy, or free fluid.    Bowel/Mesentery:    No evidence of bowel obstruction or inflammation.    Bones:  No acute osseous abnormality and no suspicious lytic or blastic lesion.                                       Medications   acetaminophen tablet 650 mg (has no administration in time range)   ferrous sulfate tablet 1 each (1 each Oral Given 12/25/24 0908)   cetirizine tablet 10 mg (has no administration in time range)   sodium chloride 0.9% flush 10 mL (has no administration in time range)   melatonin tablet 6 mg (has no administration in time range)   ondansetron injection 4 mg (has no administration in time range)   prochlorperazine injection Soln 5 mg (has no administration in time range)   polyethylene glycol packet 17 g (has no administration in time range)   senna-docusate 8.6-50 mg per tablet 1 tablet (has no administration in time range)   aluminum-magnesium hydroxide-simethicone 200-200-20 mg/5 mL suspension 30 mL (has no administration in time range)   naloxone 0.4 mg/mL injection 0.02 mg (has no administration in time range)   enoxaparin injection 40 mg (has no administration in time range)   ceFEPIme injection 2 g (2 g Intravenous Given 12/25/24 0922)   oxyCODONE-acetaminophen  mg per tablet 1 tablet (1 tablet Oral Given 12/24/24 2238)   oxyCODONE-acetaminophen 5-325 mg per tablet 1 tablet (has no administration in time range)   DAPTOmycin (CUBICIN) 670 mg in 0.9% NaCl SolP 50 mL IVPB (0 mg Intravenous Stopped 12/25/24 0356)   calcium-vitamin D3 500 mg-5 mcg (200 unit) per tablet 1 tablet (1 tablet Oral Given 12/25/24 0908)   mirtazapine tablet 30 mg (has no administration in time range)   LIDOcaine (PF) 10 mg/ml (1%) injection 50 mg (50 mg Infiltration Given by Provider 12/24/24 3407)   acetaminophen  tablet 1,000 mg (1,000 mg Oral Given 12/24/24 1556)   iohexoL (OMNIPAQUE 350) injection 100 mL (100 mLs Intravenous Given 12/24/24 1547)   fentaNYL 50 mcg/mL injection (50 mcg Intravenous Given 12/24/24 1709)   LIDOcaine HCL 10 mg/ml (1%) injection (5 mLs Other Given 12/24/24 1710)   cefTRIAXone (ROCEPHIN) 1 g in D5W 100 mL IVPB (MB+) (0 mg Intravenous Stopped 12/24/24 1811)   iohexoL (OMNIPAQUE 300) injection 100 mL (6 mLs Intra-Catheter Given 12/24/24 1725)   sodium chloride 0.9% bolus 500 mL 500 mL (0 mLs Intravenous Stopped 12/24/24 1944)   meropenem injection 1 g (1 g Intravenous Given 12/24/24 1951)   vancomycin 1750 mg in 0.9% sodium chloride 500 mL IVPB (0 mg Intravenous Stopped 12/24/24 2200)   lactated ringers bolus 1,000 mL (0 mLs Intravenous Stopped 12/25/24 0238)     Medical Decision Making  Amount and/or Complexity of Data Reviewed  Radiology: ordered.    Risk  OTC drugs.  Prescription drug management.      59-year-old female with past history as noted coming in with bleeding at the site of her nephrostomy tube insertion as well as some dark-colored urine on that side.  Started today.    On exam her nephrostomy tube appears to be mildly displaced, suture is no longer attached to the skin in his out about 1 cm.  Still outputting urine in the bag.    Will re anchored to the skin at its current location without inserting it back in any further.   Will obtain labs to evaluate for kidney function.  No infectious symptoms suggest infection.  UA to look for blood in the urine.  I expect her urine to be positive for inflammation given her chronic nephrostomy tube.     Will obtain CT abdomen pelvis to look for any displacement of the tube and monitor in the emergency department to assure that it is continuing to drain.    Patient's pain level is at her baseline will give Tylenol which is what she takes at home.  She has not had it thus far today.    Disposition based on ED workup and patient's  pathology.    Update:  Labs are unremarkable.    CT scan on my independent interpretation, nephrostomy tube seems to be in the correct position.      I went to to anchor the tube.  I noticed leak onto the bed, and noticed there was a nick in the tube.  This occurred prior to me doing any type of intervention or suturing.  See picture below.    Given the hole in the nephrostomy tube and will need to be replaced.  Will consult IR and admit patient to Hospital Medicine for placement tomorrow.          Update:  IR was able to replace the tube emergently tonight, plan is to draw UA from the new tube, reducing the chance for colonization and false positive.  Signed out to night team pending UA from the newly placed nephrostomy tube.    Given her history of ESBL if it is positive she may need to stay in the hospital for antibiotics especially given her CT findings concerning for urinary tract infection.  If not infectious she can be discharged home as she had a replaced nephrostomy tube that is now functioning and anchored in.    Consideration was made to hospitalize the patient.  Acute illness with threat to bodily function.  Discussed with interventional radiology                                      Clinical Impression:  Final diagnoses:  [T83.098A] Malfunction of nephrostomy tube  [N39.0] Urinary tract infection without hematuria, site unspecified (Primary)          ED Disposition Condition    Observation Stable                Trey Scherer MD  12/25/24 1276

## 2024-12-25 PROBLEM — E87.6 HYPOKALEMIA: Status: RESOLVED | Noted: 2021-02-03 | Resolved: 2024-12-25

## 2024-12-25 PROBLEM — Z01.810 PREPROCEDURAL CARDIOVASCULAR EXAMINATION: Status: RESOLVED | Noted: 2022-10-25 | Resolved: 2024-12-25

## 2024-12-25 PROBLEM — N13.30 HYDRONEPHROSIS, RIGHT: Status: RESOLVED | Noted: 2020-06-11 | Resolved: 2024-12-25

## 2024-12-25 PROBLEM — Z86.59 HISTORY OF SUICIDAL IDEATION: Status: RESOLVED | Noted: 2023-09-10 | Resolved: 2024-12-25

## 2024-12-25 PROBLEM — Z80.0 FAMILY HISTORY OF COLON CANCER: Status: RESOLVED | Noted: 2020-07-28 | Resolved: 2024-12-25

## 2024-12-25 PROBLEM — D64.9 ANEMIA: Status: ACTIVE | Noted: 2024-12-25

## 2024-12-25 PROBLEM — K56.609 SMALL BOWEL OBSTRUCTION: Status: RESOLVED | Noted: 2023-08-19 | Resolved: 2024-12-25

## 2024-12-25 PROBLEM — N17.9 AKI (ACUTE KIDNEY INJURY): Status: RESOLVED | Noted: 2024-08-07 | Resolved: 2024-12-25

## 2024-12-25 PROBLEM — E87.20 METABOLIC ACIDOSIS, NAG, BICARBONATE LOSSES: Status: RESOLVED | Noted: 2023-12-08 | Resolved: 2024-12-25

## 2024-12-25 LAB
ALBUMIN SERPL BCP-MCNC: 2.8 G/DL (ref 3.5–5.2)
ANION GAP SERPL CALC-SCNC: 8 MMOL/L (ref 8–16)
BASOPHILS # BLD AUTO: 0.03 K/UL (ref 0–0.2)
BASOPHILS NFR BLD: 0.4 % (ref 0–1.9)
BUN SERPL-MCNC: 13 MG/DL (ref 8–23)
CALCIUM SERPL-MCNC: 9 MG/DL (ref 8.7–10.5)
CHLORIDE SERPL-SCNC: 111 MMOL/L (ref 95–110)
CK SERPL-CCNC: 186 U/L (ref 20–180)
CO2 SERPL-SCNC: 21 MMOL/L (ref 23–29)
CREAT SERPL-MCNC: 1.2 MG/DL (ref 0.5–1.4)
DIFFERENTIAL METHOD BLD: ABNORMAL
EOSINOPHIL # BLD AUTO: 0.1 K/UL (ref 0–0.5)
EOSINOPHIL NFR BLD: 1.9 % (ref 0–8)
ERYTHROCYTE [DISTWIDTH] IN BLOOD BY AUTOMATED COUNT: 15.1 % (ref 11.5–14.5)
EST. GFR  (NO RACE VARIABLE): 51.5 ML/MIN/1.73 M^2
GLUCOSE SERPL-MCNC: 115 MG/DL (ref 70–110)
GRAM STN SPEC: NORMAL
HCT VFR BLD AUTO: 39.7 % (ref 37–48.5)
HGB BLD-MCNC: 11.7 G/DL (ref 12–16)
IMM GRANULOCYTES # BLD AUTO: 0.02 K/UL (ref 0–0.04)
IMM GRANULOCYTES NFR BLD AUTO: 0.3 % (ref 0–0.5)
LYMPHOCYTES # BLD AUTO: 1.6 K/UL (ref 1–4.8)
LYMPHOCYTES NFR BLD: 21.6 % (ref 18–48)
MAGNESIUM SERPL-MCNC: 1.8 MG/DL (ref 1.6–2.6)
MCH RBC QN AUTO: 27.1 PG (ref 27–31)
MCHC RBC AUTO-ENTMCNC: 29.5 G/DL (ref 32–36)
MCV RBC AUTO: 92 FL (ref 82–98)
MONOCYTES # BLD AUTO: 0.9 K/UL (ref 0.3–1)
MONOCYTES NFR BLD: 11.9 % (ref 4–15)
NEUTROPHILS # BLD AUTO: 4.8 K/UL (ref 1.8–7.7)
NEUTROPHILS NFR BLD: 63.9 % (ref 38–73)
NRBC BLD-RTO: 0 /100 WBC
OHS QRS DURATION: 78 MS
OHS QTC CALCULATION: 415 MS
PHOSPHATE SERPL-MCNC: 2.9 MG/DL (ref 2.7–4.5)
PLATELET # BLD AUTO: 248 K/UL (ref 150–450)
PMV BLD AUTO: 10.4 FL (ref 9.2–12.9)
POTASSIUM SERPL-SCNC: 4 MMOL/L (ref 3.5–5.1)
RBC # BLD AUTO: 4.32 M/UL (ref 4–5.4)
SODIUM SERPL-SCNC: 140 MMOL/L (ref 136–145)
WBC # BLD AUTO: 7.42 K/UL (ref 3.9–12.7)

## 2024-12-25 PROCEDURE — 96372 THER/PROPH/DIAG INJ SC/IM: CPT | Performed by: HOSPITALIST

## 2024-12-25 PROCEDURE — 25000003 PHARM REV CODE 250: Performed by: HOSPITALIST

## 2024-12-25 PROCEDURE — 36415 COLL VENOUS BLD VENIPUNCTURE: CPT | Performed by: HOSPITALIST

## 2024-12-25 PROCEDURE — 83735 ASSAY OF MAGNESIUM: CPT | Performed by: HOSPITALIST

## 2024-12-25 PROCEDURE — 11000001 HC ACUTE MED/SURG PRIVATE ROOM

## 2024-12-25 PROCEDURE — 63600175 PHARM REV CODE 636 W HCPCS: Performed by: HOSPITALIST

## 2024-12-25 PROCEDURE — G0378 HOSPITAL OBSERVATION PER HR: HCPCS

## 2024-12-25 PROCEDURE — 85025 COMPLETE CBC W/AUTO DIFF WBC: CPT | Performed by: HOSPITALIST

## 2024-12-25 PROCEDURE — 25000003 PHARM REV CODE 250: Performed by: STUDENT IN AN ORGANIZED HEALTH CARE EDUCATION/TRAINING PROGRAM

## 2024-12-25 PROCEDURE — 80069 RENAL FUNCTION PANEL: CPT | Performed by: HOSPITALIST

## 2024-12-25 PROCEDURE — 99223 1ST HOSP IP/OBS HIGH 75: CPT | Mod: ,,, | Performed by: STUDENT IN AN ORGANIZED HEALTH CARE EDUCATION/TRAINING PROGRAM

## 2024-12-25 PROCEDURE — 82550 ASSAY OF CK (CPK): CPT | Performed by: HOSPITALIST

## 2024-12-25 PROCEDURE — 63600175 PHARM REV CODE 636 W HCPCS: Performed by: STUDENT IN AN ORGANIZED HEALTH CARE EDUCATION/TRAINING PROGRAM

## 2024-12-25 RX ORDER — MELATONIN 10 MG
1 TABLET, SUBLINGUAL SUBLINGUAL 2 TIMES DAILY
Status: DISCONTINUED | OUTPATIENT
Start: 2024-12-25 | End: 2024-12-28 | Stop reason: HOSPADM

## 2024-12-25 RX ORDER — MIRTAZAPINE 30 MG/1
30 TABLET, FILM COATED ORAL NIGHTLY
Status: DISCONTINUED | OUTPATIENT
Start: 2024-12-25 | End: 2024-12-28 | Stop reason: HOSPADM

## 2024-12-25 RX ADMIN — Medication 1 TABLET: at 09:12

## 2024-12-25 RX ADMIN — Medication 1 TABLET: at 08:12

## 2024-12-25 RX ADMIN — CEFEPIME 2 G: 2 INJECTION, POWDER, FOR SOLUTION INTRAVENOUS at 09:12

## 2024-12-25 RX ADMIN — PIPERACILLIN SODIUM AND TAZOBACTAM SODIUM 4.5 G: 4; .5 INJECTION, POWDER, FOR SOLUTION INTRAVENOUS at 12:12

## 2024-12-25 RX ADMIN — FERROUS SULFATE TAB EC 325 MG (65 MG FE EQUIVALENT) 1 EACH: 325 (65 FE) TABLET DELAYED RESPONSE at 08:12

## 2024-12-25 RX ADMIN — MIRTAZAPINE 30 MG: 30 TABLET, FILM COATED ORAL at 08:12

## 2024-12-25 RX ADMIN — ENOXAPARIN SODIUM 40 MG: 40 INJECTION SUBCUTANEOUS at 06:12

## 2024-12-25 RX ADMIN — DAPTOMYCIN 670 MG: 500 INJECTION, POWDER, LYOPHILIZED, FOR SOLUTION INTRAVENOUS at 03:12

## 2024-12-25 RX ADMIN — PIPERACILLIN SODIUM AND TAZOBACTAM SODIUM 4.5 G: 4; .5 INJECTION, POWDER, FOR SOLUTION INTRAVENOUS at 08:12

## 2024-12-25 RX ADMIN — FERROUS SULFATE TAB EC 325 MG (65 MG FE EQUIVALENT) 1 EACH: 325 (65 FE) TABLET DELAYED RESPONSE at 09:12

## 2024-12-25 NOTE — PROGRESS NOTES
[facility] Medicine  Progress Note    Patient Name: Edita Riley  MRN: [id number]  Patient Class: OP- Observation   Admission Date: 12/24/2024  Length of Stay: 0 days  Attending Physician: Benson Brunner MD  Primary Care Provider: Hernan Jack MD        Subjective     Principal Problem:Pyelonephritis of right kidney        HPI:  59 y/o AAF hx of Cervical Cancer s/p XRT & Bilateral Ureteral strictures with obstructive uropathy now s/p transverse colon conduit, s/p right  Nephrostomy tube, s/p Ileostomy, hx of MDR UTI/pyelonephritis presents to the ED with complaints of nephrostomy tube malfunction.     She has had chronic back and right flank pain, reports some acute on chronic right flank/back pain.  In ED patient with new findings of red/brown tinted urine, concern of bleeding at site of nephrostomy tube insertion and also leaking in nephrostomy tubing.  She is found with right nephrostomy tube malfunction, in the ED, IR was consulted and taken patient in the afternoon for right nephrostomy tube exchange, successful exchange performed.  Her post-exchange UA with concerns of pyuria/hematuria and CT scan performed she is found with enhancement of Right kidney concerning for pyelonephritis.  She is referred for hospital medicine admission for management.     She denies any fevers/chills, has normal WBC count, her urine s/p tube exchange is clear and yellow in color.  She has hx of MDR UTI, Received Meropenem 1gram in ED and IV vancomycin + 500cc Normal saline bolus.       Overview/Hospital Course:  See problem list    Interval History:   Symptoms:  Improved.  Still has back pain and some chills.  Feels like her usual infection symptoms.  Diet:  Adequate intake.    Activity level:   Returning to normal.  Pain:  Flank pain controlled, requiring pain medication.      Review of Systems   Constitutional:  Positive for chills and fever.   Respiratory:  Negative for shortness of  breath.    Cardiovascular:  Negative for chest pain.   Gastrointestinal:  Negative for abdominal pain.   Genitourinary:  Positive for flank pain.     Objective:     Vital Signs (Most Recent):  Temp: 98.1 °F (36.7 °C) (12/25/24 1131)  Pulse: 73 (12/25/24 1131)  Resp: 18 (12/25/24 0442)  BP: 131/85 (12/25/24 1131)  SpO2: 98 % (12/25/24 1131) Vital Signs (24h Range):  Temp:  [97.4 °F (36.3 °C)-98.4 °F (36.9 °C)] 98.1 °F (36.7 °C)  Pulse:  [61-88] 73  Resp:  [14-18] 18  SpO2:  [97 %-100 %] 98 %  BP: (108-138)/(60-85) 131/85     Weight: 83.9 kg (184 lb 15.5 oz)  Body mass index is 27.31 kg/m².    Intake/Output Summary (Last 24 hours) at 12/25/2024 1239  Last data filed at 12/25/2024 0404  Gross per 24 hour   Intake --   Output 725 ml   Net -725 ml         Physical Exam  Constitutional:       General: She is not in acute distress.  Cardiovascular:      Rate and Rhythm: Normal rate and regular rhythm.      Heart sounds: Normal heart sounds. No murmur heard.  Pulmonary:      Effort: Pulmonary effort is normal. No respiratory distress.      Breath sounds: No wheezing.   Abdominal:      General: Bowel sounds are normal. There is no distension.      Tenderness: There is no abdominal tenderness.      Comments: Right flank nephrostomy tube in placed draining clear yellow urine.      LLQ - diverting urostomy with clear yellow urine output, no abdominal tenderness, surgical abdominal scars present     Musculoskeletal:      Right lower leg: No edema.      Left lower leg: No edema.   Neurological:      Mental Status: She is alert and oriented to person, place, and time.   Psychiatric:         Mood and Affect: Mood normal.             Significant Labs: All pertinent labs within the past 24 hours have been reviewed.    Significant Imaging: I have reviewed all pertinent imaging results/findings within the past 24 hours.    Assessment and Plan     * Pyelonephritis of right kidney  No sepsis criteria but pt feels like she has recurrent  infection.  abnormal CT scan, UA with pyuria/hematuria, acute on chronic right flank pain   hx of MDR urine culture results - s/p Meropenem and vancomycin in ED.    -Based on prior culture data will continue patient on Cefepime and Daptomycin, consult to ID for continued daptomycin use  -f/u pending urine culture & gram stain   -daily CBC.       Anemia  Anemia is likely due to chronic blood loss. Most recent hemoglobin and hematocrit are listed below.  Recent Labs     12/24/24  1443 12/25/24  0357   HGB 12.5 11.7*   HCT 40.9 39.7     Plan  - Monitor serial CBC  - Transfuse PRBC if patient becomes hemodynamically unstable, symptomatic or H/H drops below 7/21.  -- h/o folate def.  Will re-check    Malfunction of nephrostomy tube  S/p right nephrostomy tube exchange by IR earlier at admission   -flush nephrostomy tube qshift with sterile saline  -routine drain care and monitoring urine output.         Essential hypertension  Patient's blood pressure range in the last 24 hours was: BP  Min: 108/69  Max: 138/68.The patient's inpatient anti-hypertensive regimen is listed below:  Current Antihypertensives       Plan  - BP is controlled, no changes needed to their regimen  - HOLD home amlodipine overnight given concerns for acute infection - restart when clinically appropriate.     CKD (chronic kidney disease), stage III  Creatine stable for now. BMP reviewed- noted Estimated Creatinine Clearance: 57 mL/min (based on SCr of 1.2 mg/dL). according to latest data. Based on current GFR, CKD stage is stage 3 - GFR 30-59.  Monitor UOP and serial BMP and adjust therapy as needed. Renally dose meds. Avoid nephrotoxic medications and procedures.    Presence of urostomy  Routine wound care      Vitamin D deficiency  Noted in labs,  -- start vit d      Recurrent major depressive disorder, in partial remission  Patient has persistent depression which is severe and is currently controlled. Will Continue anti-depressant medications. We  will not consult psychiatry at this time. Patient does not display psychosis at this time. Continue to monitor closely and adjust plan of care as needed.          VTE Risk Mitigation (From admission, onward)           Ordered     enoxaparin injection 40 mg  Every 24 hours         12/24/24 2155     IP VTE HIGH RISK PATIENT  Once         12/24/24 2155     Place sequential compression device  Until discontinued         12/24/24 2155                    Discharge Planning   OK: 12/27/2024     Code Status: Full Code   Medical Readiness for Discharge Date:                            Benson Brunner MD  Department of Hospital Medicine   [facility]

## 2024-12-25 NOTE — PROVIDER PROGRESS NOTES - EMERGENCY DEPT.
Signout Note    I received signout from the previous provider.     Chief complaint:  nephrostomy tube (Bleeding at tube site on the right side)      Pertinent history &exam:  Edita Riley is a 59 y.o. female with pertinent PMH of pelvic radiation therapy for cervical cancer which was complicated by bilateral distal ureteral obstruction with bilateral hydronephrosis which was complicated by bowel perforation with colo-colonic anastomotic leak and peritonitis following colonic urinary conduit creation, s/p extended right colectomy with end ileostomy and left ureter-colonic conduit anastomotic stricture of ureteral implant to transverse colon conduit on 2/6/2024 with lysis of adhesions and takedown of ileostomy with current urinary ileostomy and a left percutaneous nephrostomy.     Patient presented with problem with her nephrostomy tube.  It appeared to be leaking/nicked.  It was replaced by IR.  CT was concerning for urinary tract infection.  She is hemodynamically stable, afebrile here.  Labs are reassuring.  Signed out pending urinalysis now that the nephrostomy tube has been replaced.    Vitals:    12/24/24 1745   BP: 131/60   Pulse: 67   Resp: 17   Temp:        Imaging Studies:    CT Abdomen Pelvis With IV Contrast NO Oral Contrast   Final Result   Abnormal      1. Findings suspicious for bilateral urinary tract infection.  See above comments.  Follow-up recommended.   2. Nonobstructing nephrolithiasis at the lower pole the left kidney.   3. Nephrostomy tube on the right.   4. Hepatomegaly with hepatic steatosis.   5. Postoperative changes.   6. This report was flagged in Epic as abnormal.         Electronically signed by: Adilson Reinoso   Date:    12/24/2024   Time:    16:19      IR Nephrostomy Tube Change    (Results Pending)       Medications Given:  Medications   meropenem injection 1 g (has no administration in time range)   vancomycin 1750 mg in 0.9% sodium chloride 500 mL IVPB (has no  administration in time range)   LIDOcaine (PF) 10 mg/ml (1%) injection 50 mg (50 mg Infiltration Given by Provider 12/24/24 1557)   acetaminophen tablet 1,000 mg (1,000 mg Oral Given 12/24/24 1556)   iohexoL (OMNIPAQUE 350) injection 100 mL (100 mLs Intravenous Given 12/24/24 1547)   fentaNYL 50 mcg/mL injection (50 mcg Intravenous Given 12/24/24 1709)   LIDOcaine HCL 10 mg/ml (1%) injection (5 mLs Other Given 12/24/24 1710)   cefTRIAXone (ROCEPHIN) 1 g in D5W 100 mL IVPB (MB+) (0 mg Intravenous Stopped 12/24/24 1811)   iohexoL (OMNIPAQUE 300) injection 100 mL (6 mLs Intra-Catheter Given 12/24/24 1725)   sodium chloride 0.9% bolus 500 mL 500 mL (0 mLs Intravenous Stopped 12/24/24 1944)       Pending Items/ MDM:  59 y.o. female with above complaint.      Urinalysis is consistent with infection.  Prior urine cultures reviewed.  Multiple instances of drug resistant urinary tract infections.  Unfortunately there is no single agent that covers all of these bacterial strains.  I discussed the plan with the patient and she would prefer admission with IV antibiotics until further information becomes available from her urine culture.  Case discussed with hospital medicine    Diagnostic Impression:    1. Urinary tract infection without hematuria, site unspecified    2. Malfunction of nephrostomy tube         ED Disposition Condition    Observation Stable               Patient and/or family understands the plan and is in agreement, verbalized understanding, questions answered    Gissel Mason MD  Emergency Medicine

## 2024-12-25 NOTE — ASSESSMENT & PLAN NOTE
No sepsis criteria but pt feels like she has recurrent infection.  abnormal CT scan, UA with pyuria/hematuria, acute on chronic right flank pain   hx of MDR urine culture results - s/p Meropenem and vancomycin in ED.    -Based on prior culture data will continue patient on Cefepime and Daptomycin, consult to ID for continued daptomycin use  -f/u pending urine culture & gram stain   -daily CBC.

## 2024-12-25 NOTE — HPI
Edita Riley is a 58 year old woman with cervical cancer s/p XRT, bilateral ureteral strictures with obstructive uropathy now s/p transverse colon conduit, right nephrostomy tube, and ileostomy who was admitted 12/24 for nephrostomy tube malfunction. Began having acute on chronic worsening of her right flank pain. She reports fevers at home. Had right percutaneous nephrostomy tube exchanged day of admission. CT A/P with findings suspicious for bilateral urinary tract infection. Urine culture pending. Symptoms improved since admission, but still with pain.      
Per admitting physician:  59 y/o AAF hx of Cervical Cancer s/p XRT & Bilateral Ureteral strictures with obstructive uropathy now s/p transverse colon conduit, s/p right  Nephrostomy tube, s/p Ileostomy, hx of MDR UTI/pyelonephritis presents to the ED with complaints of nephrostomy tube malfunction.     She has had chronic back and right flank pain, reports some acute on chronic right flank/back pain.  In ED patient with new findings of red/brown tinted urine, concern of bleeding at site of nephrostomy tube insertion and also leaking in nephrostomy tubing.  She is found with right nephrostomy tube malfunction, in the ED, IR was consulted and taken patient in the afternoon for right nephrostomy tube exchange, successful exchange performed.  Her post-exchange UA with concerns of pyuria/hematuria and CT scan performed she is found with enhancement of Right kidney concerning for pyelonephritis.  She is referred for hospital medicine admission for management.     She denies any fevers/chills, has normal WBC count, her urine s/p tube exchange is clear and yellow in color.  She has hx of MDR UTI, Received Meropenem 1gram in ED and IV vancomycin + 500cc Normal saline bolus.     
abdomen

## 2024-12-25 NOTE — SUBJECTIVE & OBJECTIVE
Past Medical History:   Diagnosis Date    Abnormal mammogram 08/25/2020    Anemia due to chronic blood loss     Anxiety     Bilateral ureteral obstruction 09/11/2020    obstructive uropathy now s/p transverse colon conduit, s/p right  Nephrostomy tube, s/p Ileostomy,    Cervical cancer 2014    s/p XRT & Bilateral Ureteral strictures with obstructive uropathy    Chronic back pain     CKD (chronic kidney disease)     Colon polyp 09/14/2015    Diarrhea due to malabsorption 11/14/2018    Difficult intubation     DVT of lower extremity, bilateral 11/04/2020    Emphysema of lung 04/10/2023    Family history of colon cancer 07/28/2020    Fibromyalgia     Folate deficiency     Fungemia 09/27/2020    GERD (gastroesophageal reflux disease)     Hard to intubate 04/20/2021    Hemifacial spasm 09/16/2015    Hiatal hernia 2014    Hypertension     Hyponatremia 09/10/2023    Impaired functional mobility, balance, gait, and endurance 07/24/2015    Lactose intolerance     Major depressive disorder, recurrent episode, moderate 06/02/2023    History of suicidal ideation    Metastatic squamous cell carcinoma to lymph node 10/11/2018    Nephrostomy complication 10/26/2023    Neuropathy due to chemotherapeutic drug 11/14/2018    Osteoarthritis of back     Peritonitis 09/22/2020    QT prolongation 04/16/2021    Refusal of blood transfusions as patient is Yazidi     Schatzki's ring 09/14/2015    Seen on outside EGD 05/2014, underwent esophageal dilatation. Bx were negative.     Seizure-like activity 12/02/2018    Sleep stage dysfunction     Noted on PSG 06/2017; negative for obstructive sleep apnea     Small bowel obstruction 08/19/2023    Stroke     Urinary tract infection associated with nephrostomy catheter 06/11/2020    recurrent MDR UTI/pyelonephritis       Past Surgical History:   Procedure Laterality Date    ANASTOMOSIS OF INTESTINE N/A 2/6/2024    Procedure: ANASTOMOSIS, INTESTINE - Ileocolic anastomosis;  Surgeon: Gail  Hammad ARREGUIN MD;  Location: [facility] OR 2ND FLR;  Service: Colon and Rectal;  Laterality: N/A;    ANTEGRADE NEPHROSTOGRAPHY Bilateral 9/28/2020    Procedure: Nephrostogram - antegrade;  Surgeon: Leslie Balbuena MD;  Location: [facility] OR 1ST FLR;  Service: Urology;  Laterality: Bilateral;    ANTEGRADE NEPHROSTOGRAPHY Left 4/20/2021    Procedure: NEPHROSTOGRAM, ANTEGRADE;  Surgeon: Leslie Balbuena MD;  [facility]: [facility] OR 1ST FLR;  Service: Urology;  Laterality: Left;    ANTEGRADE NEPHROSTOGRAPHY Right 10/27/2022    Procedure: NEPHROSTOGRAM, ANTEGRADE;  Surgeon: Chirag Russ MD;  Location: [facility] OR 1ST FLR;  Service: Urology;  Laterality: Right;    ANTEGRADE NEPHROSTOGRAPHY Right 4/21/2023    Procedure: NEPHROSTOGRAM, ANTEGRADE;  Surgeon: Chirag Russ MD;  Location: [facility] OR 2ND FLR;  Service: Urology;  Laterality: Right;    ANTEGRADE NEPHROSTOGRAPHY Left 6/6/2023    Procedure: Nephrostogram - antegrade;  Surgeon: Leslie Balbuena MD;  Location: [facility] OR 1ST FLR;  Service: Urology;  Laterality: Left;    BILATERAL OOPHORECTOMY  2015    CHOLECYSTECTOMY  11/09/2016    Done at [facility], path showed chronic cholecystitis and gallstones    cold knife conization  2014    COLECTOMY, RIGHT  9/17/2020    Procedure: COLECTOMY, RIGHT Extended;  Surgeon: Hammad Reynolds MD;  Location: [facility] OR 2ND FLR;  Service: Colon and Rectal;;    COLONOSCOPY  2014    COLONOSCOPY N/A 10/24/2016    [facility]    COLONOSCOPY N/A 5/18/2018    Procedure: COLONOSCOPY;  Surgeon: Arden Gutiérrez MD;  Location: [facility] ENDO (4TH FLR);  Service: Endoscopy;  Laterality: N/A;    COLONOSCOPY N/A 7/28/2020    Procedure: COLONOSCOPY;  Surgeon: Hammad Reynolds MD;  Location: [facility] ENDO (4TH FLR);  Service: Colon and Rectal;  Laterality: N/A;  covid test [place] 7/25    CYSTOSCOPY WITH URETEROSCOPY, RETROGRADE PYELOGRAPHY, AND INSERTION OF STENT Bilateral 3/21/2020    Procedure: CYSTOSCOPY, WITH RETROGRADE PYELOGRAM,;  Surgeon: Leslie Balbuena MD;   Location: [facility] OR 1ST FLR;  Service: Urology;  Laterality: Bilateral;    DILATION OF NEPHROSTOMY TRACT Right 10/27/2022    Procedure: DILATION, NEPHROSTOMY TRACT;  Surgeon: Chirag Russ MD;  [facility] 1ST FLR;  Service: Urology;  Laterality: Right;    ESOPHAGOGASTRODUODENOSCOPY  2014    ESOPHAGOGASTRODUODENOSCOPY  11/18/2020    ESOPHAGOGASTRODUODENOSCOPY N/A 11/18/2020    Procedure: ESOPHAGOGASTRODUODENOSCOPY (EGD);  Surgeon: Zenon Spencer MD;  Location: [facility] ENDO;  Service: Endoscopy;  Laterality: N/A;    ESOPHAGOGASTRODUODENOSCOPY N/A 12/11/2020    Procedure: EGD (ESOPHAGOGASTRODUODENOSCOPY);  Surgeon: Juancho Muse MD;  Location: [facility] ENDO (2ND FLR);  Service: Endoscopy;  Laterality: N/A;    EXCISION, SMALL INTESTINE  2/6/2024    Procedure: EXCISION, SMALL INTESTINE;  Surgeon: Hammad Reynolds MD;  [facility] 2ND FLR;  Service: Colon and Rectal;;    HYSTERECTOMY  2014    [facility] for [facility]    ILEOSTOMY  9/17/2020    Procedure: CREATION, ILEOSTOMY;  Surgeon: Hammad Reynolds MD;  [facility]: [facility] 2ND FLR;  Service: Colon and Rectal;;    ILEOSTOMY CLOSURE N/A 2/6/2024    Procedure: CLOSURE, ILEOSTOMY;  Surgeon: Hammad Reynolds MD;  [facility]: [facility] 2ND FLR;  Service: Colon and Rectal;  Laterality: N/A;    LAPAROTOMY, EXPLORATORY N/A 2/6/2024    Procedure: OPEN LAPAROTOMY, EXPLORATORY;  Surgeon: Leslie Balbuena MD;  [facility]: [facility] 2ND FLR;  Service: Urology;  Laterality: N/A;  22 modifier    LOOPOGRAM N/A 4/20/2021    Procedure: LOOPOGRAM;  Surgeon: Leslie Balbuena MD;  [facility]: [facility] 1ST FLR;  Service: Urology;  Laterality: N/A;    LOOPOGRAM N/A 6/6/2023    Procedure: LOOPOGRAM;  Surgeon: Leslie Balbuena MD;  [facility]: [facility] OR 1ST FLR;  Service: Urology;  Laterality: N/A;    LYSIS OF ADHESIONS  2/6/2024    Procedure: LYSIS, ADHESIONS;  Surgeon: Leslie Balbuena MD;  Location: [facility] OR [address] [place] [address];  Service: Urology;;    LYSIS OF ADHESIONS  2/6/2024    Procedure: LYSIS,  ADHESIONS;  Surgeon: Hammad Reynolds MD;  [facility] 2ND FLR;  Service: Colon and Rectal;;    MOBILIZATION OF SPLENIC FLEXURE N/A 9/11/2020    Procedure: MOBILIZATION, COLONIC;  Surgeon: Hammad Reynolds MD;  [facility]: [facility] 2ND FLR;  Service: Colon and Rectal;  Laterality: N/A;    NEPHROSTOGRAPHY Bilateral 4/17/2021    Procedure: Nephrostogram;  Surgeon: Celeste Surgeon;  Location: [facility];  Service: Anesthesiology;  Laterality: Bilateral;    OOPHORECTOMY      PERCUTANEOUS NEPHROLITHOTOMY Right 4/21/2023    Procedure: NEPHROLITHOTOMY, PERCUTANEOUS;  Surgeon: Chirag Russ MD;  [facility]: [facility] OR 2ND FLR;  Service: Urology;  Laterality: Right;  2.5 hrs    PERCUTANEOUS NEPHROSTOMY  4/21/2023    Procedure: CREATION, NEPHROSTOMY, PERCUTANEOUS with removal of existing nephrostomy tube;  Surgeon: Chirag Russ MD;  Location: [facility] OR [facility] FLR;  Service: Urology;;    PYELOSCOPY Right 10/27/2022    Procedure: PYELOSCOPY;  Surgeon: Chirag Russ MD;  Location: [facility] OR 1ST FLR;  Service: Urology;  Laterality: Right;    REPLACEMENT OF NEPHROSTOMY TUBE Right 10/27/2022    Procedure: REPLACEMENT, NEPHROSTOMY TUBE;  Surgeon: Chirag Russ MD;  Location: [facility] OR 1ST FLR;  Service: Urology;  Laterality: Right;    RETROPERITONEAL LYMPHADENECTOMY Right 9/17/2018    Procedure: LYMPHADENECTOMY, RETROPERITONEUM;  Surgeon: Sebas Reed MD;  Location: [facility] OR 2ND FLR;  Service: General;  Laterality: Right;  ILIAC    REVISION OF ANASTOMOSIS OF URETER TO ILEAL CONDUIT N/A 2/6/2024    Procedure: REVISION, ANASTOMOSIS, URETEROILEAL;  Surgeon: Leslie Balbuena MD;  Location: [facility] OR 2ND FLR;  Service: Urology;  Laterality: N/A;    URETEROSCOPIC REMOVAL OF URETERIC CALCULUS Right 10/27/2022    Procedure: REMOVAL, CALCULUS, URETER, URETEROSCOPIC;  Surgeon: Chirag Russ MD;  Location: [facility] OR 1ST FLR;  Service: Urology;  Laterality: Right;    URETEROSCOPY Right 10/27/2022    Procedure: URETEROSCOPY-ANTEGRADE;   Surgeon: Chirag Russ MD;  Location: [facility] OR [address] [place] [address];  Service: Urology;  Laterality: Right;       Review of patient's allergies indicates:   Allergen Reactions    Bee sting [allergen ext-venom-honey bee]      Rash      Grass pollen-bermuda, standard      rash       Medications:  Medications Prior to Admission   Medication Sig    acetaminophen (TYLENOL) 500 MG tablet Take 1 tablet (500 mg total) by mouth every 6 (six) hours as needed for Pain.    amLODIPine (NORVASC) 5 MG tablet Take 1 tablet (5 mg total) by mouth once daily.    ferrous sulfate (FEOSOL) 325 mg (65 mg iron) Tab tablet TAKE 1 TABLET BY MOUTH TWICE A DAY    loratadine (CLARITIN) 10 mg tablet Take 10 mg by mouth daily as needed for Allergies.    mirtazapine (REMERON) 30 MG tablet Take 1 tablet (30 mg total) by mouth nightly.    pantoprazole (PROTONIX) 40 MG tablet Take 1 tablet (40 mg total) by mouth once daily. for 14 days     Antibiotics (From admission, onward)      Start     Stop Route Frequency Ordered    12/25/24 1330  piperacillin-tazobactam (ZOSYN) 4.5 g in D5W 100 mL IVPB (MB+)         -- IV Every 8 hours (non-standard times) 12/25/24 1218          Antifungals (From admission, onward)      None          Antivirals (From admission, onward)      None             Immunization History   Administered Date(s) Administered    Influenza - Quadrivalent - PF *Preferred* (6 months and older) 11/16/2017, 09/19/2019    Tdap 03/28/2017       Family History       Problem Relation (Age of Onset)    Breast cancer Maternal Aunt    Cancer Father, Mother    Cervical cancer Mother    Colon cancer Father    Drug abuse Daughter, Daughter    Esophageal cancer Father    Heart disease Sister, Maternal Grandmother    Suicide Daughter          Social History     Socioeconomic History    Marital status:      Spouse name: Hammad    Number of children: 2   Tobacco Use    Smoking status: Never    Smokeless tobacco: Never   Substance and Sexual Activity     Alcohol use: No     Alcohol/week: 0.0 standard drinks of alcohol    Drug use: No    Sexual activity: Yes     Partners: Male     Birth control/protection: None     Comment:  19 years since    Social History Narrative    , twin daughters (1  2018), disabled due to childhood stroke, [employer] sophia     Social Drivers of Health     Financial Resource Strain: Low Risk  (2024)    Overall Financial Resource Strain (CARDIA)     Difficulty of Paying Living Expenses: Not very hard   Food Insecurity: No Food Insecurity (2024)    Hunger Vital Sign     Worried About Running Out of Food in the Last Year: Never true     Ran Out of Food in the Last Year: Never true   Transportation Needs: No Transportation Needs (2024)    TRANSPORTATION NEEDS     Transportation : No   Physical Activity: Sufficiently Active (2024)    Exercise Vital Sign     Days of Exercise per Week: 7 days     Minutes of Exercise per Session: 30 min   Stress: No Stress Concern Present (2024)    [employer] [place] [employer] - Occupational Stress Questionnaire     Feeling of Stress : Not at all   Housing Stability: Low Risk  (2024)    Housing Stability Vital Sign     Unable to Pay for Housing in the Last Year: No     Homeless in the Last Year: No     Review of Systems   Constitutional:  Positive for activity change, chills, fatigue and fever.   Gastrointestinal:  Positive for abdominal pain. Negative for nausea and vomiting.   Genitourinary:  Positive for flank pain.     Objective:     Vital Signs (Most Recent):  Temp: 98.1 °F (36.7 °C) (24 1131)  Pulse: 73 (24 1131)  Resp: 18 (24 0442)  BP: 131/85 (24 1131)  SpO2: 98 % (24 1131) Vital Signs (24h Range):  Temp:  [97.4 °F (36.3 °C)-98.4 °F (36.9 °C)] 98.1 °F (36.7 °C)  Pulse:  [61-85] 73  Resp:  [14-18] 18  SpO2:  [97 %-100 %] 98 %  BP: (108-136)/(60-85) 131/85     Weight: 83.9 kg (184 lb 15.5  oz)  Body mass index is 27.31 kg/m².    Estimated Creatinine Clearance: 57 mL/min (based on SCr of 1.2 mg/dL).     Physical Exam  Vitals reviewed.   Constitutional:       Appearance: Normal appearance. She is not ill-appearing.   HENT:      Head: Normocephalic.   Pulmonary:      Effort: Pulmonary effort is normal. No respiratory distress.   Abdominal:      Palpations: Abdomen is soft.      Comments: LLQ ileostomy, R percutaneous nephrostomy tube   Skin:     General: Skin is warm and dry.   Neurological:      Mental Status: She is alert.   Psychiatric:         Mood and Affect: Mood normal.         Behavior: Behavior normal.          Significant Labs:   Microbiology Results (last 7 days)       Procedure Component Value Units Date/Time    Gram stain [[id number]] Collected: 12/24/24 1812    Order Status: Completed Specimen: Urine from Clean Catch Updated: 12/25/24 0006     Gram Stain Result Rare WBC's      Few Gram negative rods      Rare Gram positive cocci    Narrative:      add on test urine gram per nevaeh jacobo  12/24/2024  23:33     Gram stain [[id number]]     Order Status: Completed Specimen: Urine     Urine culture [[id number]] Collected: 12/24/24 1812    Order Status: No result Specimen: Urine Updated: 12/24/24 1837            Significant Imaging: I have reviewed all pertinent imaging results/findings within the past 24 hours.

## 2024-12-25 NOTE — ASSESSMENT & PLAN NOTE
Anemia is likely due to chronic blood loss. Most recent hemoglobin and hematocrit are listed below.  Recent Labs     12/24/24  1443 12/25/24  0357   HGB 12.5 11.7*   HCT 40.9 39.7     Plan  - Monitor serial CBC  - Transfuse PRBC if patient becomes hemodynamically unstable, symptomatic or H/H drops below 7/21.  -- h/o folate def.  Will re-check

## 2024-12-25 NOTE — CONSULTS
[facility] - [facility] Surg  Infectious Disease  Consult Note    Patient Name: Edita Riley  MRN: [id number]  Admission Date: 12/24/2024  Hospital Length of Stay: 0 days  Attending Physician: Benson Brunnre MD  Primary Care Provider: Hernan Jack MD     Isolation Status: No active isolations    Patient information was obtained from patient and ER records.      Inpatient consult to Infectious Diseases  Consult performed by: Charito Arroyo MD  Consult ordered by: Nathan Vu MD        Assessment/Plan:     ID  * Pyelonephritis of right kidney  Edita Riley is a 58 year old woman with cervical cancer s/p XRT, bilateral ureteral strictures with obstructive uropathy now s/p transverse colon conduit, right nephrostomy tube, and ileostomy who was admitted 12/24 for nephrostomy tube malfunction. Began having acute on chronic worsening of her right flank pain. She reports fevers at home. Had right percutaneous nephrostomy tube exchanged day of admission. CT A/P with findings suspicious for bilateral urinary tract infection. Urine culture pending, currently on daptomycin and cefepime. Symptoms improved since admission, but still with pain.     Recommendations  - would stop daptomycin and cefepime   - can start pip-tazo 4.5 q8  - will follow urine culture         Above discussed with primary team.     Time: 75 minutes   50% of time spent on face-to-face counseling and coordination of care. Counseling included review of test results, diagnosis, and treatment plan with patient and/or family.  I have reviewed hospital notes from  service and other specialty providers as well as outside medical records. I have also reviewed CBC, CMP/BMP,  cultures and imaging with my interpretation as documented. Patient is high risk of morbidity, on antibiotics requiring intensive monitoring for toxicity.       Thank you for your consult. I will follow-up with patient. Please contact us if you have any additional  questions.    Charito Arroyo MD  Infectious Disease  [facility] - Med Surg    Subjective:     Principal Problem: Pyelonephritis of right kidney    HPI: Edita Riley is a 58 year old woman with cervical cancer s/p XRT, bilateral ureteral strictures with obstructive uropathy now s/p transverse colon conduit, right nephrostomy tube, and ileostomy who was admitted 12/24 for nephrostomy tube malfunction. Began having acute on chronic worsening of her right flank pain. She reports fevers at home. Had right percutaneous nephrostomy tube exchanged day of admission. CT A/P with findings suspicious for bilateral urinary tract infection. Urine culture pending. Symptoms improved since admission, but still with pain.        Past Medical History:   Diagnosis Date    Abnormal mammogram 08/25/2020    Anemia due to chronic blood loss     Anxiety     Bilateral ureteral obstruction 09/11/2020    obstructive uropathy now s/p transverse colon conduit, s/p right  Nephrostomy tube, s/p Ileostomy,    Cervical cancer 2014    s/p XRT & Bilateral Ureteral strictures with obstructive uropathy    Chronic back pain     CKD (chronic kidney disease)     Colon polyp 09/14/2015    Diarrhea due to malabsorption 11/14/2018    Difficult intubation     DVT of lower extremity, bilateral 11/04/2020    Emphysema of lung 04/10/2023    Family history of colon cancer 07/28/2020    Fibromyalgia     Folate deficiency     Fungemia 09/27/2020    GERD (gastroesophageal reflux disease)     Hard to intubate 04/20/2021    Hemifacial spasm 09/16/2015    Hiatal hernia 2014    Hypertension     Hyponatremia 09/10/2023    Impaired functional mobility, balance, gait, and endurance 07/24/2015    Lactose intolerance     Major depressive disorder, recurrent episode, moderate 06/02/2023    History of suicidal ideation    Metastatic squamous cell carcinoma to lymph node 10/11/2018    Nephrostomy complication 10/26/2023    Neuropathy due to chemotherapeutic drug  11/14/2018    Osteoarthritis of back     Peritonitis 09/22/2020    QT prolongation 04/16/2021    Refusal of blood transfusions as patient is Yazidism     Schatzki's ring 09/14/2015    Seen on outside EGD 05/2014, underwent esophageal dilatation. Bx were negative.     Seizure-like activity 12/02/2018    Sleep stage dysfunction     Noted on PSG 06/2017; negative for obstructive sleep apnea     Small bowel obstruction 08/19/2023    Stroke     Urinary tract infection associated with nephrostomy catheter 06/11/2020    recurrent MDR UTI/pyelonephritis       Past Surgical History:   Procedure Laterality Date    ANASTOMOSIS OF INTESTINE N/A 2/6/2024    Procedure: ANASTOMOSIS, INTESTINE - Ileocolic anastomosis;  Surgeon: Hammad Reynolds MD;  Location: [facility] OR 2ND FLR;  Service: Colon and Rectal;  Laterality: N/A;    ANTEGRADE NEPHROSTOGRAPHY Bilateral 9/28/2020    Procedure: Nephrostogram - antegrade;  Surgeon: Leslie Balbuena MD;  Location: [facility] OR 1ST FLR;  Service: Urology;  Laterality: Bilateral;    ANTEGRADE NEPHROSTOGRAPHY Left 4/20/2021    Procedure: NEPHROSTOGRAM, ANTEGRADE;  Surgeon: Leslie Balbuena MD;  Location: [facility] OR 1ST FLR;  Service: Urology;  Laterality: Left;    ANTEGRADE NEPHROSTOGRAPHY Right 10/27/2022    Procedure: NEPHROSTOGRAM, ANTEGRADE;  Surgeon: Chirag Russ MD;  Location: [facility] OR 1ST FLR;  Service: Urology;  Laterality: Right;    ANTEGRADE NEPHROSTOGRAPHY Right 4/21/2023    Procedure: NEPHROSTOGRAM, ANTEGRADE;  Surgeon: Chirag Russ MD;  Location: [facility] OR 2ND FLR;  Service: Urology;  Laterality: Right;    ANTEGRADE NEPHROSTOGRAPHY Left 6/6/2023    Procedure: Nephrostogram - antegrade;  Surgeon: Leslie Balbuena MD;  Location: [facility] OR 1ST FLR;  Service: Urology;  Laterality: Left;    BILATERAL OOPHORECTOMY  2015    CHOLECYSTECTOMY  11/09/2016    Done at [facility], path showed chronic cholecystitis and gallstones    cold knife conization  2014    COLECTOMY, RIGHT  9/17/2020     Procedure: COLECTOMY, RIGHT Extended;  Surgeon: Hammad Reynolds MD;  Location: [facility] OR 2ND FLR;  Service: Colon and Rectal;;    COLONOSCOPY  2014    [facility] N[facility]24[facility]2016    [facility]    COLONOSCOPY N/A 5/18/2018    Procedure: COLONOSCOPY;  Surgeon: Arden Gutiérrez MD;  Location: [facility] (4TH FLR);  Service: Endoscopy;  Laterality: N/A;    COLONOSCOPY N/A 7/28/2020    Procedure: COLONOSCOPY;  Surgeon: Hammad Reynolds MD;  Location: [facility] (4TH FLR);  Service: Colon and Rectal;  Laterality: N/A;  covid test [place] 7/25    CYSTOSCOPY WITH URETEROSCOPY, RETROGRADE PYELOGRAPHY, AND INSERTION OF STENT Bilateral 3/21/2020    Procedure: CYSTOSCOPY, WITH RETROGRADE PYELOGRAM,;  Surgeon: Leslie Balbuena MD;  Location: [facility] OR 1ST FLR;  Service: Urology;  Laterality: Bilateral;    DILATION OF NEPHROSTOMY TRACT Right 10/27/2022    Procedure: DILATION, NEPHROSTOMY TRACT;  Surgeon: Chirag Russ MD;  Location: [facility] OR 1ST FLR;  Service: Urology;  Laterality: Right;    ESOPHAGOGASTRODUODENOSCOPY  2014    ESOPHAGOGASTRODUODENOSCOPY  11/18/2020    ESOPHAGOGASTRODUODENOSCOPY N/A 11/18/2020    Procedure: ESOPHAGOGASTRODUODENOSCOPY (EGD);  Surgeon: Zenon Spencer MD;  Location: [facility];  Service: Endoscopy;  Laterality: N/A;    ESOPHAGOGASTRODUODENOSCOPY N/A 12/11/2020    Procedure: EGD (ESOPHAGOGASTRODUODENOSCOPY);  Surgeon: Juancho Muse MD;  Location: [facility] (2ND FLR);  Service: Endoscopy;  Laterality: N/A;    EXCISION, SMALL INTESTINE  2/6/2024    Procedure: EXCISION, SMALL INTESTINE;  Surgeon: Hammad Reynolds MD;  Location: [facility] OR 2ND FLR;  Service: Colon and Rectal;;    HYSTERECTOMY  2014    [facility] for cervical cancer    ILEOSTOMY  9/17/2020    Procedure: CREATION, ILEOSTOMY;  Surgeon: Hammad Reynolds MD;  Location: [facility] OR 2ND FLR;  Service: Colon and Rectal;;    ILEOSTOMY CLOSURE N/A 2/6/2024    Procedure: CLOSURE, ILEOSTOMY;  Surgeon: Hammad Reynolds MD;  [facility]   Service: Colon and Rectal;  Laterality: N/A;    LAPAROTOMY, EXPLORATORY N/A 2/6/2024    Procedure: OPEN LAPAROTOMY, EXPLORATORY;  Surgeon: Leslie Balbuena MD;  Location: [facility] OR 2ND FLR;  Service: Urology;  Laterality: N/A;  22 modifier    LOOPOGRAM N/A 4/20/2021    Procedure: LOOPOGRAM;  Surgeon: Leslie Balbuena MD;  Location: [facility] OR 1ST FLR;  Service: Urology;  Laterality: N/A;    LOOPOGRAM N/A 6/6/2023    Procedure: LOOPOGRAM;  Surgeon: Leslie Balbuena MD;  Location: [facility] OR 1ST FLR;  Service: Urology;  Laterality: N/A;    LYSIS OF ADHESIONS  2/6/2024    Procedure: LYSIS, ADHESIONS;  Surgeon: Leslie Balbuena MD;  Location: [facility] OR 2ND FLR;  Service: Urology;;    LYSIS OF ADHESIONS  2/6/2024    Procedure: LYSIS, ADHESIONS;  Surgeon: Hammad Reynolds MD;  Location: [facility] OR 2ND FLR;  Service: Colon and Rectal;;    MOBILIZATION OF SPLENIC FLEXURE N/A 9/11/2020    Procedure: MOBILIZATION, COLONIC;  Surgeon: Hammad Reynolds MD;  Location: [facility] OR [facility] FLR;  Service: Colon and Rectal;  Laterality: N/A;    NEPHROSTOGRAPHY Bilateral 4/17/2021    Procedure: Nephrostogram;  Surgeon: Celeste Surgeon;  Location: [facility];  Service: Anesthesiology;  Laterality: Bilateral;    OOPHORECTOMY      PERCUTANEOUS NEPHROLITHOTOMY Right 4/21/2023    Procedure: NEPHROLITHOTOMY, PERCUTANEOUS;  Surgeon: Chirag Russ MD;  Location: [facility] OR 2ND FLR;  Service: Urology;  Laterality: Right;  2.5 hrs    PERCUTANEOUS NEPHROSTOMY  4/21/2023    Procedure: CREATION, NEPHROSTOMY, PERCUTANEOUS with removal of existing nephrostomy tube;  Surgeon: Chirag Russ MD;  [facility];  Service: Urology;;    PYELOSCOPY Right 10/27/2022    Procedure: PYELOSCOPY;  Surgeon: Chirag Russ MD;  Location: [facility] OR [facility] FLR;  Service: Urology;  Laterality: Right;    REPLACEMENT OF NEPHROSTOMY TUBE Right 10/27/2022    Procedure: REPLACEMENT, NEPHROSTOMY TUBE;  Surgeon: Chirag Russ MD;  Location: [facility] OR [address] [place] [address];  Service:  Urology;  Laterality: Right;    RETROPERITONEAL LYMPHADENECTOMY Right 9/17/2018    Procedure: LYMPHADENECTOMY, RETROPERITONEUM;  Surgeon: Sebas Reed MD;  Location: [facility] OR 2ND FLR;  Service: General;  Laterality: Right;  ILIAC    REVISION OF ANASTOMOSIS OF URETER TO ILEAL CONDUIT N/A 2/6/2024    Procedure: REVISION, ANASTOMOSIS, URETEROILEAL;  Surgeon: Leslie Balbuena MD;  Location: [facility] OR 2ND FLR;  Service: Urology;  Laterality: N/A;    URETEROSCOPIC REMOVAL OF URETERIC CALCULUS Right 10/27/2022    Procedure: REMOVAL, CALCULUS, URETER, URETEROSCOPIC;  Surgeon: Chirag Russ MD;  Location: [facility] OR 1ST FLR;  Service: Urology;  Laterality: Right;    URETEROSCOPY Right 10/27/2022    Procedure: URETEROSCOPY-ANTEGRADE;  Surgeon: Chirag Russ MD;  [facility];  Service: Urology;  Laterality: Right;       Review of patient's allergies indicates:   Allergen Reactions    Bee sting [allergen ext-venom-honey bee]      Rash      Grass pollen-bermuda, standard      rash       Medications:  Medications Prior to Admission   Medication Sig    acetaminophen (TYLENOL) 500 MG tablet Take 1 tablet (500 mg total) by mouth every 6 (six) hours as needed for Pain.    amLODIPine (NORVASC) 5 MG tablet Take 1 tablet (5 mg total) by mouth once daily.    ferrous sulfate (FEOSOL) 325 mg (65 mg iron) Tab tablet TAKE 1 TABLET BY MOUTH TWICE A DAY    loratadine (CLARITIN) 10 mg tablet Take 10 mg by mouth daily as needed for Allergies.    mirtazapine (REMERON) 30 MG tablet Take 1 tablet (30 mg total) by mouth nightly.    pantoprazole (PROTONIX) 40 MG tablet Take 1 tablet (40 mg total) by mouth once daily. for 14 days     Antibiotics (From admission, onward)      Start     Stop Route Frequency Ordered    12/25/24 1330  piperacillin-tazobactam (ZOSYN) 4.5 g in D5W 100 mL IVPB (MB+)         -- IV Every 8 hours (non-standard times) 12/25/24 1218          Antifungals (From admission, onward)      None           Antivirals (From admission, onward)      None             Immunization History   Administered Date(s) Administered    Influenza - Quadrivalent - PF *Preferred* (6 months and older) 2017, 2019    Tdap 2017       Family History       Problem Relation (Age of Onset)    Breast cancer Maternal Aunt    Cancer Father, Mother    Cervical cancer Mother    Colon cancer Father    Drug abuse Daughter, Daughter    Esophageal cancer Father    Heart disease Sister, Maternal Grandmother    Suicide Daughter          Social History     Socioeconomic History    Marital status:      Spouse name: Hammad    Number of children: 2   Tobacco Use    Smoking status: Never    Smokeless tobacco: Never   Substance and Sexual Activity    Alcohol use: No     Alcohol/week: 0.0 standard drinks of alcohol    Drug use: No    Sexual activity: Yes     Partners: Male     Birth control/protection: None     Comment:  19 years since    Social History Narrative    , twin daughters (1  2018), disabled due to childhood stroke, [employer]ab[employer]ism sophia     Social Drivers of Health     Financial Resource Strain: Low Risk  (2024)    Overall Financial Resource Strain (CARDIA)     Difficulty of Paying Living Expenses: Not very hard   Food Insecurity: No Food Insecurity (2024)    Hunger Vital Sign     Worried About Running Out of Food in the Last Year: Never true     Ran Out of Food in the Last Year: Never true   Transportation Needs: No Transportation Needs (2024)    TRANSPORTATION NEEDS     Transportation : No   Physical Activity: Sufficiently Active (2024)    Exercise Vital Sign     Days of Exercise per Week: 7 days     Minutes of Exercise per Session: 30 min   Stress: No Stress Concern Present (2024)    [employer] [place] [employer] - Occupational Stress Questionnaire     Feeling of Stress : Not at all   Housing Stability: Low Risk  (2024)    Housing  Stability Vital Sign     Unable to Pay for Housing in the Last Year: No     Homeless in the Last Year: No     Review of Systems   Constitutional:  Positive for activity change, chills, fatigue and fever.   Gastrointestinal:  Positive for abdominal pain. Negative for nausea and vomiting.   Genitourinary:  Positive for flank pain.     Objective:     Vital Signs (Most Recent):  Temp: 98.1 °F (36.7 °C) (12/25/24 1131)  Pulse: 73 (12/25/24 1131)  Resp: 18 (12/25/24 0442)  BP: 131/85 (12/25/24 1131)  SpO2: 98 % (12/25/24 1131) Vital Signs (24h Range):  Temp:  [97.4 °F (36.3 °C)-98.4 °F (36.9 °C)] 98.1 °F (36.7 °C)  Pulse:  [61-85] 73  Resp:  [14-18] 18  SpO2:  [97 %-100 %] 98 %  BP: (108-136)/(60-85) 131/85     Weight: 83.9 kg (184 lb 15.5 oz)  Body mass index is 27.31 kg/m².    Estimated Creatinine Clearance: 57 mL/min (based on SCr of 1.2 mg/dL).     Physical Exam  Vitals reviewed.   Constitutional:       Appearance: Normal appearance. She is not ill-appearing.   HENT:      Head: Normocephalic.   Pulmonary:      Effort: Pulmonary effort is normal. No respiratory distress.   Abdominal:      Palpations: Abdomen is soft.      Comments: LLQ ileostomy, R percutaneous nephrostomy tube   Skin:     General: Skin is warm and dry.   Neurological:      Mental Status: She is alert.   Psychiatric:         Mood and Affect: Mood normal.         Behavior: Behavior normal.          Significant Labs:   Microbiology Results (last 7 days)       Procedure Component Value Units Date/Time    Gram stain [[id number]] Collected: 12/24/24 1812    Order Status: Completed Specimen: Urine from Clean Catch Updated: 12/25/24 0006     Gram Stain Result Rare WBC's      Few Gram negative rods      Rare Gram positive cocci    Narrative:      add on test urine gram per nevaeh jacobo  12/24/2024  23:33     Gram stain [[id number]]     Order Status: Completed Specimen: Urine     Urine culture [[id number]] Collected: 12/24/24 1812    Order Status: No  result Specimen: Urine Updated: 12/24/24 9574            Significant Imaging: I have reviewed all pertinent imaging results/findings within the past 24 hours.

## 2024-12-25 NOTE — ASSESSMENT & PLAN NOTE
-abnormal CT scan, UA with pyuria/hematuria, acute on chronic right flank pain   -hx of MDR urine culture results - s/p Meropenem and vancomycin in ED.    -Based on prior culture data will continue patient on Cefepime and Daptomycin, consult to ID for continued daptomycin use  -f/u pending urine culture & gram stain   -daily CBC.

## 2024-12-25 NOTE — ASSESSMENT & PLAN NOTE
S/p right nephrostomy tube exchange by IR earlier today   -flush nephrostomy tube qshift with sterile saline  -routine drain care and monitoring urine output.   -

## 2024-12-25 NOTE — ASSESSMENT & PLAN NOTE
Edita Riley is a 58 year old woman with cervical cancer s/p XRT, bilateral ureteral strictures with obstructive uropathy now s/p transverse colon conduit, right nephrostomy tube, and ileostomy who was admitted 12/24 for nephrostomy tube malfunction. Began having acute on chronic worsening of her right flank pain. She reports fevers at home. Had right percutaneous nephrostomy tube exchanged day of admission. CT A/P with findings suspicious for bilateral urinary tract infection. Urine culture pending, currently on daptomycin and cefepime. Symptoms improved since admission, but still with pain.     Recommendations  - would stop daptomycin and cefepime   - can start pip-tazo 4.5 q8  - will follow urine culture

## 2024-12-25 NOTE — PROGRESS NOTES
Pharmacist Renal Dose Adjustment Note    Edita Riley is a 59 y.o. female being treated with the medication cefepime    Patient Data:    Vital Signs (Most Recent):  Temp: 97.5 °F (36.4 °C) (12/24/24 2142)  Pulse: 69 (12/24/24 2142)  Resp: 17 (12/24/24 2142)  BP: 120/65 (12/24/24 2142)  SpO2: 100 % (12/24/24 2142) Vital Signs (72h Range):  Temp:  [97.5 °F (36.4 °C)-98.4 °F (36.9 °C)]   Pulse:  [61-88]   Resp:  [14-18]   BP: (118-138)/(60-80)   SpO2:  [99 %-100 %]      Recent Labs   Lab 12/24/24  1443   CREATININE 1.3     Serum creatinine: 1.3 mg/dL 12/24/24 1443  Estimated creatinine clearance: 52.6 mL/min    Medication: cefepime 2 grams every 8 hours will be changed to cefepime 2 grams every 12 hours for CrCl 30-60 mL/min    David CastilloD

## 2024-12-25 NOTE — SUBJECTIVE & OBJECTIVE
Interval History:   Symptoms:  Improved.  Still has back pain and some chills.  Feels like her usual infection symptoms.  Diet:  Adequate intake.    Activity level:   Returning to normal.  Pain:  Flank pain controlled, requiring pain medication.      Review of Systems   Constitutional:  Positive for chills and fever.   Respiratory:  Negative for shortness of breath.    Cardiovascular:  Negative for chest pain.   Gastrointestinal:  Negative for abdominal pain.   Genitourinary:  Positive for flank pain.     Objective:     Vital Signs (Most Recent):  Temp: 98.1 °F (36.7 °C) (12/25/24 1131)  Pulse: 73 (12/25/24 1131)  Resp: 18 (12/25/24 0442)  BP: 131/85 (12/25/24 1131)  SpO2: 98 % (12/25/24 1131) Vital Signs (24h Range):  Temp:  [97.4 °F (36.3 °C)-98.4 °F (36.9 °C)] 98.1 °F (36.7 °C)  Pulse:  [61-88] 73  Resp:  [14-18] 18  SpO2:  [97 %-100 %] 98 %  BP: (108-138)/(60-85) 131/85     Weight: 83.9 kg (184 lb 15.5 oz)  Body mass index is 27.31 kg/m².    Intake/Output Summary (Last 24 hours) at 12/25/2024 1239  Last data filed at 12/25/2024 0404  Gross per 24 hour   Intake --   Output 725 ml   Net -725 ml         Physical Exam  Constitutional:       General: She is not in acute distress.  Cardiovascular:      Rate and Rhythm: Normal rate and regular rhythm.      Heart sounds: Normal heart sounds. No murmur heard.  Pulmonary:      Effort: Pulmonary effort is normal. No respiratory distress.      Breath sounds: No wheezing.   Abdominal:      General: Bowel sounds are normal. There is no distension.      Tenderness: There is no abdominal tenderness.      Comments: Right flank nephrostomy tube in placed draining clear yellow urine.      LLQ - diverting urostomy with clear yellow urine output, no abdominal tenderness, surgical abdominal scars present     Musculoskeletal:      Right lower leg: No edema.      Left lower leg: No edema.   Neurological:      Mental Status: She is alert and oriented to person, place, and time.    Psychiatric:         Mood and Affect: Mood normal.             Significant Labs: All pertinent labs within the past 24 hours have been reviewed.    Significant Imaging: I have reviewed all pertinent imaging results/findings within the past 24 hours.

## 2024-12-25 NOTE — PLAN OF CARE
12/25/24 1405   Discharge Assessment   Assessment Type Discharge Planning Assessment   Confirmed/corrected address, phone number and insurance Yes   Confirmed Demographics Correct on Facesheet   Source of Information patient   If unable to respond/provide information was family/caregiver contacted? Yes   Contact Name/Number Hammad Riley,spouse at [phone]   Does patient/caregiver understand observation status Yes   Communicated OK with patient/caregiver Yes   Reason For Admission Malfunction of nephrosto   People in Home spouse   Facility Arrived From: patient presented to the ED from home   Do you expect to return to your current living situation? Yes   Do you have help at home or someone to help you manage your care at home? Yes   Who are your caregiver(s) and their phone number(s)? evelyn Orellanasue at [phone]   Prior to hospitilization cognitive status: Unable to Assess   Current cognitive status: Alert/Oriented   Walking or Climbing Stairs Difficulty no   Dressing/Bathing Difficulty no   Home Accessibility wheelchair accessible   Equipment Currently Used at Home none   Readmission within 30 days? No   Patient currently being followed by outpatient case management? No   Do you currently have service(s) that help you manage your care at home? No   Do you take prescription medications? Yes   Do you have prescription coverage? Yes   Coverage [place] Medicaid   Do you have any problems affording any of your prescribed medications? No   Is the patient taking medications as prescribed? yes   Who is going to help you get home at discharge? Hammad [email or web address] at [phone]   How do you get to doctors appointments? car, drives self   Are you on dialysis? No   Do you take coumadin? No   Discharge Plan A Home;Home with family   DME Needed Upon Discharge  other (see comments)   Discharge Plan discussed with: Spouse/sig other   Name(s) and Number(s) Hammad [email or web address].spouse at  [phone]   Transition of Care Barriers None   Physical Activity   On average, how many days per week do you engage in moderate to strenuous exercise (like a brisk walk)? 7 days   On average, how many minutes do you engage in exercise at this level? 30 min   Financial Resource Strain   How hard is it for you to pay for the very basics like food, housing, medical care, and heating? Not very   Housing Stability   In the last 12 months, was there a time when you were not able to pay the mortgage or rent on time? N   At any time in the past 12 months, were you homeless or living in a shelter (including now)? N   Transportation Needs   Has the lack of transportation kept you from medical appointments, meetings, work or from getting things needed for daily living? No   Food Insecurity   Within the past 12 months, you worried that your food would run out before you got the money to buy more. Never true   Within the past 12 months, the food you bought just didn't last and you didn't have money to get more. Never true   Stress   Do you feel stress - tense, restless, nervous, or anxious, or unable to sleep at night because your mind is troubled all the time - these days? Not at all   Social Isolation   How often do you feel lonely or isolated from those around you?  Never   Alcohol Use   Q1: How often do you have a drink containing alcohol? Never   Q2: How many drinks containing alcohol do you have on a typical day when you are drinking? None   Q3: How often do you have six or more drinks on one occasion? Never   Utilities   In the past 12 months has the electric, gas, oil, or water company threatened to shut off services in your home? No   Health Literacy   How often do you need to have someone help you when you read instructions, pamphlets, or other written material from your doctor or pharmacy? Never   OTHER   Name(s) of People in Home Hammad Riley.spouse at [phone]     SW met with patient at bedside to  complete discharge planning assessment.  Patient alert and oriented xs 4.  Patient verified all demographic information on facesheet is correct.  Patient verified PCP is Dr. Jack.Patient verified primary health insurance is medicare part a and B and blue cross /blue shield   .  Patient with NO home health but has no listed DME.  Patient is a Full code.  Patient not on dialysis or medication coumadin.  Patient with no 30 day admission.  Patient with no financial issues at this time.  Patient family will provide transportation upon discharge from facility.  Patient's has help at home,she live with family .  Patient's family will provide transport at discharge.

## 2024-12-25 NOTE — ASSESSMENT & PLAN NOTE
S/p right nephrostomy tube exchange by IR earlier at admission   -flush nephrostomy tube qshift with sterile saline  -routine drain care and monitoring urine output.

## 2024-12-25 NOTE — ASSESSMENT & PLAN NOTE
Patient's blood pressure range in the last 24 hours was: BP  Min: 118/60  Max: 138/68.The patient's inpatient anti-hypertensive regimen is listed below:  Current Antihypertensives       Plan  - BP is controlled, no changes needed to their regimen  - HOLD home amlodipine overnight given concerns for acute infection - restart when clinically appropriate.

## 2024-12-25 NOTE — HOSPITAL COURSE
Admitted to hospital medicine for malfunction of nephrostomy tube and pyelonephritis of the right kidney.  Interventional Radiology exchanged nephrostomy tube.  Brief episode of blood-tinged urine noted following exchange but this resolved.  ID consulted, pt started on Zosyn.  Urine culture positive for E faecalis, discussed with ID who recommended discontinuing Zosyn in favor of amoxicillin for a total of 10 days; prescription provided.  B12 low normal and supplementation provided.  Recommend outpatient follow up with PCP for repeat labs.    Pt deemed appropriate for discharge; seen and examined prior to departure.  The patient's chronic medical conditions were managed appropriately. Discharge plan of care was discussed at length with patient including patient's need for close outpatient follow-up. Medication counseling also took place with the patient being educated on potential side effects/adverse events. Patient also counseled about avoiding driving, operating machinery, or participating in any activities that may pose harm to self or others if they are taking medications that cause drowsiness or altered mental status. Patient also counseled to take medicines as prescribed and do not drink alcohol, use tobacco products, or use illicit substances. Patient counseled to return to the hospital or seek medical care if baseline status should suddenly change, return of symptoms occurs, or for any new or concerning symptoms that arise. Patient was in agreement with plan of care going forward and was then discharged.

## 2024-12-25 NOTE — SUBJECTIVE & OBJECTIVE
Past Medical History:   Diagnosis Date    Abnormal mammogram 08/25/2020    Abnormal mammogram 08/25/2020    Acute blood loss anemia     Acute deep vein thrombosis (DVT) of lower extremity 12/09/2020    Advance care planning 04/30/2021    ODESSA (acute kidney injury) 03/21/2020    Anemia due to chronic blood loss     Anemia due to chronic kidney disease     Anemia due to chronic kidney disease 05/18/2020    Anxiety     Bilateral ureteral obstruction 09/11/2020    Bilious vomiting with nausea 06/11/2023    Cardiovascular event risk -- low 09/14/2015    ASCVD 10-year risk 1.9% (with optimal risk factors 1.3%) as of 9/14/2015     Cervical cancer 2014    Chronic back pain     Colostomy care     Deep vein thrombosis     Depression     Diarrhea due to malabsorption 11/14/2018    Difficult intubation     Discharge planning issues 04/30/2021    Disorder of kidney and ureter     DVT of lower extremity, bilateral 11/04/2020    Edema 04/10/2023    Emphysema of lung 04/10/2023    Fibromyalgia     Fungemia 09/27/2020    Generalized abdominal pain 08/25/2020    GERD (gastroesophageal reflux disease)     Hemifacial spasm 09/16/2015    Hiatal hernia 2014    History of cervical cancer 10/11/2018    History of essential hypertension 05/04/2015    Not requiring medications at this time  BP is low- concern that this may correlate with increased stool output from stoma. Encourage her to contact GI and her PCP.    Hx of psychiatric care     Cymbalta, trazodone    Hypertension     Hypomagnesemia 11/21/2018    Hyponatremia 09/10/2023    Impaired functional mobility, balance, gait, and endurance 07/24/2015    Lactose intolerance     Major depressive disorder, recurrent episode, moderate 06/02/2023    Metastatic squamous cell carcinoma to lymph node 10/11/2018    Moderate episode of recurrent major depressive disorder 04/21/2021    Nephrostomy complication 10/26/2023    Neuropathy due to chemotherapeutic drug 11/14/2018    Osteoarthritis of back      Peritonitis 09/22/2020    Physical deconditioning 04/30/2021    Pseudomonas urinary tract infection 04/21/2021    Psychiatric problem     Pyelitis 09/09/2023    QT prolongation 04/16/2021    Refusal of blood transfusions as patient is [employer]     Schatzki's ring 09/14/2015    Seen on outside EGD 05/2014, underwent esophageal dilatation. Bx were negative.     Seizure-like activity 12/02/2018    Seizures     Sleep stage dysfunction     Noted on PSG 06/2017; negative for obstructive sleep apnea     Stroke     Urinary tract infection associated with nephrostomy catheter 06/11/2020    Wound infection after surgery 09/24/2020       Past Surgical History:   Procedure Laterality Date    ANASTOMOSIS OF INTESTINE N/A 2/6/2024    Procedure: ANASTOMOSIS, INTESTINE - Ileocolic anastomosis;  Surgeon: Hammad Reynolds MD;  Location: [facility] OR 2ND FLR;  Service: Colon and Rectal;  Laterality: N/A;    ANTEGRADE NEPHROSTOGRAPHY Bilateral 9/28/2020    Procedure: Nephrostogram - antegrade;  Surgeon: Leslie Balbuena MD;  Location: [facility] OR 1ST FLR;  Service: Urology;  Laterality: Bilateral;    ANTEGRADE NEPHROSTOGRAPHY Left 4/20/2021    Procedure: NEPHROSTOGRAM, ANTEGRADE;  Surgeon: Leslie Balbuena MD;  Location: [facility] OR 1ST FLR;  Service: Urology;  Laterality: Left;    ANTEGRADE NEPHROSTOGRAPHY Right 10/27/2022    Procedure: NEPHROSTOGRAM, ANTEGRADE;  Surgeon: Chirag Russ MD;  Location: [facility] OR [facility] FLR;  Service: Urology;  Laterality: Right;    ANTEGRADE NEPHROSTOGRAPHY Right 4/21/2023    Procedure: NEPHROSTOGRAM, ANTEGRADE;  Surgeon: Chirag Russ MD;  Location: [facility] OR 2ND FLR;  Service: Urology;  Laterality: Right;    ANTEGRADE NEPHROSTOGRAPHY Left 6/6/2023    Procedure: Nephrostogram - antegrade;  Surgeon: Leslie Balbuena MD;  Location: [facility] OR 1ST FLR;  Service: Urology;  Laterality: Left;    BILATERAL OOPHORECTOMY  2015    CHOLECYSTECTOMY  11/09/2016    Done at [facility], path showed chronic  cholecystitis and gallstones    cold knife conization  2014    COLECTOMY, RIGHT  9/17/2020    Procedure: COLECTOMY, RIGHT Extended;  Surgeon: Hammad Reynolds MD;  Location: [facility] OR 2ND FLR;  Service: Colon and Rectal;;    COLONOSCOPY  2014    COLONOSCOPY N/A 10/24/2016    at [facility], Dr Gutiérrez    COLONOSCOPY N/A 5/18/2018    Procedure: COLONOSCOPY;  Surgeon: Arden Gutiérrez MD;  Location: [facility] (4TH FLR);  Service: Endoscopy;  Laterality: N/A;    COLONOSCOPY N/A 7/28/2020    Procedure: COLONOSCOPY;  Surgeon: Hammad Reynolds MD;  Location: [facility] (4TH FLR);  Service: Colon and Rectal;  Laterality: N/A;  covid test [place] 7/25    CYSTOSCOPY WITH URETEROSCOPY, RETROGRADE PYELOGRAPHY, AND INSERTION OF STENT Bilateral 3/21/2020    Procedure: CYSTOSCOPY, WITH RETROGRADE PYELOGRAM,;  Surgeon: Leslie Balbuena MD;  Location: [facility] OR 1ST FLR;  Service: Urology;  Laterality: Bilateral;    DILATION OF NEPHROSTOMY TRACT Right 10/27/2022    Procedure: DILATION, NEPHROSTOMY TRACT;  Surgeon: Chirag Russ MD;  Location: [facility] OR 1ST FLR;  Service: Urology;  Laterality: Right;    ESOPHAGOGASTRODUODENOSCOPY  2014    ESOPHAGOGASTRODUODENOSCOPY  11/18/2020    ESOPHAGOGASTRODUODENOSCOPY N/A 11/18/2020    Procedure: ESOPHAGOGASTRODUODENOSCOPY (EGD);  Surgeon: Zenon Spencer MD;  Location: [facility];  Service: Endoscopy;  Laterality: N/A;    ESOPHAGOGASTRODUODENOSCOPY N/A 12/11/2020    Procedure: EGD (ESOPHAGOGASTRODUODENOSCOPY);  Surgeon: Juancho Muse MD;  Location: [facility] ENDO (2ND FLR);  Service: Endoscopy;  Laterality: N/A;    EXCISION, SMALL INTESTINE  2/6/2024    Procedure: EXCISION, SMALL INTESTINE;  Surgeon: Hammad Reynolds MD;  Location: [facility] OR 2ND FLR;  Service: Colon and Rectal;;    HYSTERECTOMY  2014    [facility] for cervical cancer    ILEOSTOMY  9/17/2020    Procedure: CREATION, ILEOSTOMY;  Surgeon: Hammad Reynolds MD;  Location: [facility] OR 2ND FLR;  Service: Colon and Rectal;;    ILEOSTOMY CLOSURE N/A 2/6/2024     Procedure: CLOSURE, ILEOSTOMY;  Surgeon: Hammad Reynolds MD;  [facility] 2ND FLR;  Service: Colon and Rectal;  Laterality: N/A;    LAPAROTOMY, EXPLORATORY N/A 2/6/2024    Procedure: OPEN LAPAROTOMY, EXPLORATORY;  Surgeon: Leslie Balbuena MD;  [facility] OR 2ND FLR;  Service: Urology;  Laterality: N/A;  22 modifier    LOOPOGRAM N/A 4/20/2021    Procedure: LOOPOGRAM;  Surgeon: Leslie Balbuena MD;  [facility] OR 1ST FLR;  Service: Urology;  Laterality: N/A;    LOOPOGRAM N/A 6/6/2023    Procedure: LOOPOGRAM;  Surgeon: Leslie Balbuena MD;  [facility] OR 1ST FLR;  Service: Urology;  Laterality: N/A;    LYSIS OF ADHESIONS  2/6/2024    Procedure: LYSIS, ADHESIONS;  Surgeon: Leslie Balbuena MD;  [facility] OR 2ND FLR;  Service: Urology;;    LYSIS OF ADHESIONS  2/6/2024    Procedure: LYSIS, ADHESIONS;  Surgeon: Hammad Reynolds MD;  [facility] 2ND FLR;  Service: Colon and Rectal;;    MOBILIZATION OF SPLENIC FLEXURE N/A 9/11/2020    Procedure: MOBILIZATION, COLONIC;  Surgeon: Hammad Reynolds MD;  [facility] OR 2ND FLR;  Service: Colon and Rectal;  Laterality: N/A;    NEPHROSTOGRAPHY Bilateral 4/17/2021    Procedure: Nephrostogram;  Surgeon: Celeste Surgeon;  Location: [facility];  Service: Anesthesiology;  Laterality: Bilateral;    OOPHORECTOMY      PERCUTANEOUS NEPHROLITHOTOMY Right 4/21/2023    Procedure: NEPHROLITHOTOMY, PERCUTANEOUS;  Surgeon: Chirag Russ MD;  Location: [facility] OR 2ND FLR;  Service: Urology;  Laterality: Right;  2.5 hrs    PERCUTANEOUS NEPHROSTOMY  4/21/2023    Procedure: CREATION, NEPHROSTOMY, PERCUTANEOUS with removal of existing nephrostomy tube;  Surgeon: Chirag Russ MD;  Location: [facility] OR 2ND FLR;  Service: Urology;;    PYELOSCOPY Right 10/27/2022    Procedure: PYELOSCOPY;  Surgeon: Chirag Russ MD;  Location: [facility] OR 1ST FLR;  Service: Urology;  Laterality: Right;    REPLACEMENT OF NEPHROSTOMY TUBE Right 10/27/2022    Procedure: REPLACEMENT,  NEPHROSTOMY TUBE;  Surgeon: Chirag Russ MD;  Location: [facility] OR 1ST FLR;  Service: Urology;  Laterality: Right;    RETROPERITONEAL LYMPHADENECTOMY Right 9/17/2018    Procedure: LYMPHADENECTOMY, RETROPERITONEUM;  Surgeon: Sebas Reed MD;  Location: [facility] OR 2ND FLR;  Service: General;  Laterality: Right;  ILIAC    REVISION OF ANASTOMOSIS OF URETER TO ILEAL CONDUIT N/A 2/6/2024    Procedure: REVISION, ANASTOMOSIS, URETEROILEAL;  Surgeon: Leslie Balbuena MD;  Location: [facility] OR 2ND FLR;  Service: Urology;  Laterality: N/A;    URETEROSCOPIC REMOVAL OF URETERIC CALCULUS Right 10/27/2022    Procedure: REMOVAL, CALCULUS, URETER, URETEROSCOPIC;  Surgeon: Chirag Russ MD;  [facility];  Service: Urology;  Laterality: Right;    URETEROSCOPY Right 10/27/2022    Procedure: URETEROSCOPY-ANTEGRADE;  Surgeon: Chirag Russ MD;  Location: [facility] OR [address] [place] [address];  Service: Urology;  Laterality: Right;       Review of patient's allergies indicates:   Allergen Reactions    Bee sting [allergen ext-venom-honey bee]      Rash      Grass pollen-bermuda, standard      rash       No current facility-administered medications on file prior to encounter.     Current Outpatient Medications on File Prior to Encounter   Medication Sig    acetaminophen (TYLENOL) 500 MG tablet Take 1 tablet (500 mg total) by mouth every 6 (six) hours as needed for Pain.    amLODIPine (NORVASC) 5 MG tablet Take 1 tablet (5 mg total) by mouth once daily.    ferrous sulfate (FEOSOL) 325 mg (65 mg iron) Tab tablet TAKE 1 TABLET BY MOUTH TWICE A DAY    loratadine (CLARITIN) 10 mg tablet Take 10 mg by mouth daily as needed for Allergies.    mirtazapine (REMERON) 30 MG tablet Take 1 tablet (30 mg total) by mouth nightly.    pantoprazole (PROTONIX) 40 MG tablet Take 1 tablet (40 mg total) by mouth once daily. for 14 days     Family History       Problem Relation (Age of Onset)    Breast cancer Maternal Aunt    Cancer Father, Mother     Cervical cancer Mother    Colon cancer Father    Drug abuse Daughter, Daughter    Esophageal cancer Father    Heart disease Sister, Maternal Grandmother    Suicide Daughter          Tobacco Use    Smoking status: Never    Smokeless tobacco: Never   Substance and Sexual Activity    Alcohol use: No     Alcohol/week: 0.0 standard drinks of alcohol    Drug use: No    Sexual activity: Yes     Partners: Male     Birth control/protection: None     Comment:  19 years since 1999     Review of Systems   Constitutional:  Negative for activity change, chills and fever.   HENT:  Negative for sore throat.    Respiratory:  Negative for cough and shortness of breath.    Cardiovascular:  Negative for leg swelling.   Endocrine: Positive for cold intolerance.   Genitourinary:  Positive for hematuria.   Musculoskeletal:  Positive for back pain.   Skin: Negative.    Neurological: Negative.    Psychiatric/Behavioral: Negative.       Objective:     Vital Signs (Most Recent):  Temp: 97.4 °F (36.3 °C) (12/24/24 2348)  Pulse: 64 (12/24/24 2348)  Resp: 18 (12/24/24 2348)  BP: 119/75 (12/24/24 2348)  SpO2: 97 % (12/24/24 2348) Vital Signs (24h Range):  Temp:  [97.4 °F (36.3 °C)-98.4 °F (36.9 °C)] 97.4 °F (36.3 °C)  Pulse:  [61-88] 64  Resp:  [14-18] 18  SpO2:  [97 %-100 %] 97 %  BP: (118-138)/(60-80) 119/75     Weight: 83.9 kg (184 lb 15.5 oz)  Body mass index is 27.31 kg/m².     Physical Exam  Vitals and nursing note reviewed.   Constitutional:       General: She is not in acute distress.  HENT:      Head: Normocephalic and atraumatic.   Eyes:      General: No scleral icterus.  Cardiovascular:      Rate and Rhythm: Normal rate and regular rhythm.   Pulmonary:      Effort: Pulmonary effort is normal. No respiratory distress.      Breath sounds: No wheezing or rales.   Abdominal:      Palpations: Abdomen is soft.      Tenderness: There is right CVA tenderness.      Comments: Right flank nephrostomy tube in placed draining clear yellow  "urine.     LLQ - diverting urostomy with clear yellow urine output, no abdominal tenderness, surgical abdominal scars present.    Musculoskeletal:      Cervical back: Neck supple.      Right lower leg: No edema.      Left lower leg: No edema.   Skin:     General: Skin is warm and dry.   Neurological:      General: No focal deficit present.      Mental Status: She is alert and oriented to person, place, and time.                Significant Labs: All pertinent labs within the past 24 hours have been reviewed.  CBC:   Recent Labs   Lab 12/24/24  1443   WBC 7.98   HGB 12.5   HCT 40.9        CMP:   Recent Labs   Lab 12/24/24  1443      K 4.4      CO2 22*   GLU 86   BUN 15   CREATININE 1.3   CALCIUM 9.6   PROT 7.9   ALBUMIN 3.4*   BILITOT 0.3   ALKPHOS 117   AST 22   ALT 19   ANIONGAP 11     Lactic Acid: No results for input(s): "LACTATE" in the last 48 hours.  Magnesium: No results for input(s): "MG" in the last 48 hours.  Troponin: No results for input(s): "TROPONINI", "TROPONINIHS" in the last 48 hours.  TSH: No results for input(s): "TSH" in the last 4320 hours.  Urine Culture: No results for input(s): "LABURIN" in the last 48 hours.  Urine Studies:   Recent Labs   Lab 12/24/24  1812   COLORU [place]*   APPEARANCEUA Hazy*   PHUR 6.0   SPECGRAV >=1.030*   PROTEINUA 2+*   GLUCUA Negative   KETONESU Negative   BILIRUBINUA Negative   OCCULTUA 3+*   NITRITE Negative   LEUKOCYTESUR 2+*   RBCUA >100*   WBCUA >100*   BACTERIA Occasional   SQUAMEPITHEL 1   HYALINECASTS 0       Significant Imaging: I have reviewed all pertinent imaging results/findings within the past 24 hours.    CT ABDOMEN PELVIS WITH IV CONTRAST     CLINICAL HISTORY:  Nephrostomy catheter displacement;Dark urine in the nephrostomy tube and slight displacement.  Suture seems to have popped came out about 1 cm.;     TECHNIQUE:  Low dose axial images, sagittal and coronal reformations were obtained from the lung bases to the pubic symphysis " following the IV administration of 100 mL of Omnipaque 350 .  Oral contrast was not administered.     COMPARISON:  08/09/2024     FINDINGS:  Abdomen:     - Lower thorax:     - Lung bases: No infiltrates and no nodules.     - Liver: The liver is mildly enlarged.  Mild diffuse fatty infiltration.  No focal abnormality.     - Gallbladder: No calcified gallstones.     - Bile Ducts: No evidence of intra or extra hepatic biliary ductal dilation.     - Spleen: Negative.     - Kidneys: The kidneys appear normal in size.  Percutaneous nephrostomy tube at the lower pole the right kidney.  There is mild inflammatory stranding of the right renal pelvis and right ureter could be associated with urinary tract infection.  Bilateral ureteral diversion procedure extending to a pouch near the midline.  Stable minimal anastomotic calcification at the junction of the right ureter and ileocolic anastomosis.     There is similar mild inflammatory stranding and thickening of the left renal pelvis and proximal left ureter.  Bilateral ureteral diversion procedure.  A urinary tract infection is a consideration.  Follow-up recommended.     No hydronephrosis bilaterally.  Nonobstructing stones at the lower pole of the left kidney.     Follow-up recommended.     - Adrenals: Unremarkable.     - Pancreas: No mass or peripancreatic fat stranding.     - Retroperitoneum:  Few subcentimeter periaortic retroperitoneal lymph nodes.     - Vascular: No abdominal aortic aneurysm.     - Abdominal wall:  Left lower quadrant urostomy.     Postop changes of small and large bowel in the pelvis.     Pelvis:     No pelvic mass, adenopathy, or free fluid.     Bowel/Mesentery:     No evidence of bowel obstruction or inflammation.     Bones:  No acute osseous abnormality and no suspicious lytic or blastic lesion.     Impression:     1. Findings suspicious for bilateral urinary tract infection.  See above comments.  Follow-up recommended.  2. Nonobstructing  nephrolithiasis at the lower pole the left kidney.  3. Nephrostomy tube on the right.  4. Hepatomegaly with hepatic steatosis.  5. Postoperative changes.  6. This report was flagged in Epic as abnormal.        Electronically signed by: Adilson Reinoso  Date:                                            12/24/2024      Radiology Post-Procedure Note     Pre Op Diagnosis: tube malfunction     Post Op Diagnosis: same     Procedure: PCN exchange      Procedure performed by: Susan Cueva MD     Written Informed Consent Obtained: Yes        Estimated Blood Loss: Minimal     Findings:   Right PCN exchange.      Patient tolerated procedure well.     Keep to bag drainage. Patient should return in 10-12 weeks for routine exchange.      Susan Cueva MD  Interventional Radiology

## 2024-12-25 NOTE — ASSESSMENT & PLAN NOTE
Creatine stable for now. BMP reviewed- noted Estimated Creatinine Clearance: 57 mL/min (based on SCr of 1.2 mg/dL). according to latest data. Based on current GFR, CKD stage is stage 3 - GFR 30-59.  Monitor UOP and serial BMP and adjust therapy as needed. Renally dose meds. Avoid nephrotoxic medications and procedures.

## 2024-12-25 NOTE — H&P
[facility] Medicine  History & Physical    Patient Name: Edita Riley  MRN: [id number]  Patient Class: OP- Observation  Admission Date: 12/24/2024  Attending Physician: Benson Brunner MD [facility]  Admitting Physician: Nathan Vu MD-  Primary Care Provider: Hernan Jack MD      Patient information was obtained from patient, past medical records, and ER records.     Subjective:     Principal Problem:Pyelonephritis of right kidney    Chief Complaint:   Chief Complaint   Patient presents with    nephrostomy tube     Bleeding at tube site on the right side        HPI: 59 y/o AAF hx of Cervical Cancer s/p XRT & Bilateral Ureteral strictures with obstructive uropathy now s/p transverse colon conduit, s/p right  Nephrostomy tube, s/p Ileostomy, hx of MDR UTI/pyelonephritis presents to the ED with complaints of nephrostomy tube malfunction.     She has had chronic back and right flank pain, reports some acute on chronic right flank/back pain.  In ED patient with new findings of red/brown tinted urine, concern of bleeding at site of nephrostomy tube insertion and also leaking in nephrostomy tubing.  She is found with right nephrostomy tube malfunction, in the ED, IR was consulted and taken patient in the afternoon for right nephrostomy tube exchange, successful exchange performed.  Her post-exchange UA with concerns of pyuria/hematuria and CT scan performed she is found with enhancement of Right kidney concerning for pyelonephritis.  She is referred for hospital medicine admission for management.     She denies any fevers/chills, has normal WBC count, her urine s/p tube exchange is clear and yellow in color.  She has hx of MDR UTI, Received Meropenem 1gram in ED and IV vancomycin + 500cc Normal saline bolus.       Past Medical History:   Diagnosis Date    Abnormal mammogram 08/25/2020    Abnormal mammogram 08/25/2020    Acute blood loss anemia     Acute deep vein thrombosis  (DVT) of lower extremity 12/09/2020    Advance care planning 04/30/2021    ODESSA (acute kidney injury) 03/21/2020    Anemia due to chronic blood loss     Anemia due to chronic kidney disease     Anemia due to chronic kidney disease 05/18/2020    Anxiety     Bilateral ureteral obstruction 09/11/2020    Bilious vomiting with nausea 06/11/2023    Cardiovascular event risk -- low 09/14/2015    ASCVD 10-year risk 1.9% (with optimal risk factors 1.3%) as of 9/14/2015     Cervical cancer 2014    Chronic back pain     Colostomy care     Deep vein thrombosis     Depression     Diarrhea due to malabsorption 11/14/2018    Difficult intubation     Discharge planning issues 04/30/2021    Disorder of kidney and ureter     DVT of lower extremity, bilateral 11/04/2020    Edema 04/10/2023    Emphysema of lung 04/10/2023    Fibromyalgia     Fungemia 09/27/2020    Generalized abdominal pain 08/25/2020    GERD (gastroesophageal reflux disease)     Hemifacial spasm 09/16/2015    Hiatal hernia 2014    History of cervical cancer 10/11/2018    History of essential hypertension 05/04/2015    Not requiring medications at this time  BP is low- concern that this may correlate with increased stool output from stoma. Encourage her to contact GI and her PCP.    Hx of psychiatric care     Cymbalta, trazodone    Hypertension     Hypomagnesemia 11/21/2018    Hyponatremia 09/10/2023    Impaired functional mobility, balance, gait, and endurance 07/24/2015    Lactose intolerance     Major depressive disorder, recurrent episode, moderate 06/02/2023    Metastatic squamous cell carcinoma to lymph node 10/11/2018    Moderate episode of recurrent major depressive disorder 04/21/2021    Nephrostomy complication 10/26/2023    Neuropathy due to chemotherapeutic drug 11/14/2018    Osteoarthritis of back     Peritonitis 09/22/2020    Physical deconditioning 04/30/2021    Pseudomonas urinary tract infection 04/21/2021    Psychiatric problem     Pyelitis 09/09/2023     QT prolongation 04/16/2021    Refusal of blood transfusions as patient is Yazidism     Schatzki's ring 09/14/2015    Seen on outside EGD 05/2014, underwent esophageal dilatation. Bx were negative.     Seizure-like activity 12/02/2018    Seizures     Sleep stage dysfunction     Noted on PSG 06/2017; negative for obstructive sleep apnea     Stroke     Urinary tract infection associated with nephrostomy catheter 06/11/2020    Wound infection after surgery 09/24/2020       Past Surgical History:   Procedure Laterality Date    ANASTOMOSIS OF INTESTINE N/A 2/6/2024    Procedure: ANASTOMOSIS, INTESTINE - Ileocolic anastomosis;  Surgeon: Hammad Reynolds MD;  Location: [facility] OR 2ND FLR;  Service: Colon and Rectal;  Laterality: N/A;    ANTEGRADE NEPHROSTOGRAPHY Bilateral 9/28/2020    Procedure: Nephrostogram - antegrade;  Surgeon: Leslie Balbuena MD;  Location: [facility] OR 1ST FLR;  Service: Urology;  Laterality: Bilateral;    ANTEGRADE NEPHROSTOGRAPHY Left 4/20/2021    Procedure: NEPHROSTOGRAM, ANTEGRADE;  Surgeon: Leslie Balbuena MD;  Location: [facility] OR 1ST FLR;  Service: Urology;  Laterality: Left;    ANTEGRADE NEPHROSTOGRAPHY Right 10/27/2022    Procedure: NEPHROSTOGRAM, ANTEGRADE;  Surgeon: Chirag Russ MD;  Location: [facility] OR 1ST FLR;  Service: Urology;  Laterality: Right;    ANTEGRADE NEPHROSTOGRAPHY Right 4/21/2023    Procedure: NEPHROSTOGRAM, ANTEGRADE;  Surgeon: Chirag Russ MD;  Location: [facility] OR 2ND FLR;  Service: Urology;  Laterality: Right;    ANTEGRADE NEPHROSTOGRAPHY Left 6/6/2023    Procedure: Nephrostogram - antegrade;  Surgeon: Leslie Balbuena MD;  Location: [facility] OR 1ST FLR;  Service: Urology;  Laterality: Left;    BILATERAL OOPHORECTOMY  2015    CHOLECYSTECTOMY  11/09/2016    Done at [facility], path showed chronic cholecystitis and gallstones    cold knife conization  2014    COLECTOMY, RIGHT  9/17/2020    Procedure: COLECTOMY, RIGHT Extended;  Surgeon: Hammad Reynolds MD;  Location:  [facility] OR 2ND FLR;  Service: Colon and Rectal;;    COLONOSCOPY  2014    COLONOSCOPY N/A 10/24/2016    at [facility], Dr Thomas    COLONOSCOPY N/A 5/18/2018    Procedure: COLONOSCOPY;  Surgeon: Arden Gutiérrez MD;  Location: [facility] (4TH FLR);  Service: Endoscopy;  Laterality: N/A;    COLONOSCOPY N/A 7/28/2020    Procedure: COLONOSCOPY;  Surgeon: Hammad Reynolds MD;  Location: [facility] (4TH FLR);  Service: Colon and Rectal;  Laterality: N/A;  covid test [place] 7/25    CYSTOSCOPY WITH URETEROSCOPY, RETROGRADE PYELOGRAPHY, AND INSERTION OF STENT Bilateral 3/21/2020    Procedure: CYSTOSCOPY, WITH RETROGRADE PYELOGRAM,;  Surgeon: Leslie Balbuena MD;  Location: [facility] OR 1ST FLR;  Service: Urology;  Laterality: Bilateral;    DILATION OF NEPHROSTOMY TRACT Right 10/27/2022    Procedure: DILATION, NEPHROSTOMY TRACT;  Surgeon: Chirag Russ MD;  Location: [facility] OR 1ST FLR;  Service: Urology;  Laterality: Right;    ESOPHAGOGASTRODUODENOSCOPY  2014    ESOPHAGOGASTRODUODENOSCOPY  11/18/2020    ESOPHAGOGASTRODUODENOSCOPY N/A 11/18/2020    Procedure: ESOPHAGOGASTRODUODENOSCOPY (EGD);  Surgeon: Zenon Spencer MD;  Location: [facility];  Service: Endoscopy;  Laterality: N/A;    ESOPHAGOGASTRODUODENOSCOPY N/A 12/11/2020    Procedure: EGD (ESOPHAGOGASTRODUODENOSCOPY);  Surgeon: Juancho Muse MD;  Location: [facility] (2ND FLR);  Service: Endoscopy;  Laterality: N/A;    EXCISION, SMALL INTESTINE  2/6/2024    Procedure: EXCISION, SMALL INTESTINE;  Surgeon: Hammad Reynolds MD;  Location: [facility] OR 2ND FLR;  Service: Colon and Rectal;;    HYSTERECTOMY  2014    [facility] for cervical cancer    ILEOSTOMY  9/17/2020    Procedure: CREATION, ILEOSTOMY;  Surgeon: Hammad Reynolds MD;  Location: [facility] OR 2ND FLR;  Service: Colon and Rectal;;    ILEOSTOMY CLOSURE N/A 2/6/2024    Procedure: CLOSURE, ILEOSTOMY;  Surgeon: Hammad Reynolds MD;  Location: [facility] OR 2ND FLR;  Service: Colon and Rectal;  Laterality: N/A;    LAPAROTOMY, EXPLORATORY N/A  2/6/2024    Procedure: OPEN LAPAROTOMY, EXPLORATORY;  Surgeon: Leslie Balbuena MD;  Location: [facility] OR 2ND FLR;  Service: Urology;  Laterality: N/A;  22 modifier    LOOPOGRAM N/A 4/20/2021    Procedure: LOOPOGRAM;  Surgeon: Leslie Balbuena MD;  Location: [facility] OR 1ST FLR;  Service: Urology;  Laterality: N/A;    LOOPOGRAM N/A 6/6/2023    Procedure: LOOPOGRAM;  Surgeon: Leslie Balbuena MD;  Location: [facility] OR 1ST FLR;  Service: Urology;  Laterality: N/A;    LYSIS OF ADHESIONS  2/6/2024    Procedure: LYSIS, ADHESIONS;  Surgeon: Leslie Balbuena MD;  Location: [facility] OR 2ND FLR;  Service: Urology;;    LYSIS OF ADHESIONS  2/6/2024    Procedure: LYSIS, ADHESIONS;  Surgeon: Hammad Reynolds MD;  Location: [facility] OR 2ND FLR;  Service: Colon and Rectal;;    MOBILIZATION OF SPLENIC FLEXURE N/A 9/11/2020    Procedure: MOBILIZATION, COLONIC;  Surgeon: Hammad Reynolds MD;  Location: [facility] OR 2ND FLR;  Service: Colon and Rectal;  Laterality: N/A;    NEPHROSTOGRAPHY Bilateral 4/17/2021    Procedure: Nephrostogram;  Surgeon: Celeste Surgeon;  Location: [facility];  Service: Anesthesiology;  Laterality: Bilateral;    OOPHORECTOMY      PERCUTANEOUS NEPHROLITHOTOMY Right 4/21/2023    Procedure: NEPHROLITHOTOMY, PERCUTANEOUS;  Surgeon: Chirag Russ MD;  Location: [facility] OR [facility] FLR;  Service: Urology;  Laterality: Right;  2.5 hrs    PERCUTANEOUS NEPHROSTOMY  4/21/2023    Procedure: CREATION, NEPHROSTOMY, PERCUTANEOUS with removal of existing nephrostomy tube;  Surgeon: Chirag Russ MD;  [facility];  Service: Urology;;    PYELOSCOPY Right 10/27/2022    Procedure: PYELOSCOPY;  Surgeon: Chirag Russ MD;  Location: [facility] OR [facility] FLR;  Service: Urology;  Laterality: Right;    REPLACEMENT OF NEPHROSTOMY TUBE Right 10/27/2022    Procedure: REPLACEMENT, NEPHROSTOMY TUBE;  Surgeon: Chirag Russ MD;  Location: [facility] OR [address] [place] [address];  Service: Urology;  Laterality: Right;    RETROPERITONEAL LYMPHADENECTOMY Right 9/17/2018     Procedure: LYMPHADENECTOMY, RETROPERITONEUM;  Surgeon: Sebas Reed MD;  Location: [facility] OR 2ND FLR;  Service: General;  Laterality: Right;  ILIAC    REVISION OF ANASTOMOSIS OF URETER TO ILEAL CONDUIT N/A 2/6/2024    Procedure: REVISION, ANASTOMOSIS, URETEROILEAL;  Surgeon: Leslie Balbuena MD;  Location: [facility] OR 2ND FLR;  Service: Urology;  Laterality: N/A;    URETEROSCOPIC REMOVAL OF URETERIC CALCULUS Right 10/27/2022    Procedure: REMOVAL, CALCULUS, URETER, URETEROSCOPIC;  Surgeon: Chirag Russ MD;  Location: [facility] OR 1ST FLR;  Service: Urology;  Laterality: Right;    URETEROSCOPY Right 10/27/2022    Procedure: URETEROSCOPY-ANTEGRADE;  Surgeon: Chirag Russ MD;  Location: [facility] OR [address] [place] [address];  Service: Urology;  Laterality: Right;       Review of patient's allergies indicates:   Allergen Reactions    Bee sting [allergen ext-venom-honey bee]      Rash      Grass pollen-bermuda, standard      rash       No current facility-administered medications on file prior to encounter.     Current Outpatient Medications on File Prior to Encounter   Medication Sig    acetaminophen (TYLENOL) 500 MG tablet Take 1 tablet (500 mg total) by mouth every 6 (six) hours as needed for Pain.    amLODIPine (NORVASC) 5 MG tablet Take 1 tablet (5 mg total) by mouth once daily.    ferrous sulfate (FEOSOL) 325 mg (65 mg iron) Tab tablet TAKE 1 TABLET BY MOUTH TWICE A DAY    loratadine (CLARITIN) 10 mg tablet Take 10 mg by mouth daily as needed for Allergies.    mirtazapine (REMERON) 30 MG tablet Take 1 tablet (30 mg total) by mouth nightly.    pantoprazole (PROTONIX) 40 MG tablet Take 1 tablet (40 mg total) by mouth once daily. for 14 days     Family History       Problem Relation (Age of Onset)    Breast cancer Maternal Aunt    Cancer Father, Mother    Cervical cancer Mother    Colon cancer Father    Drug abuse Daughter, Daughter    Esophageal cancer Father    Heart disease Sister, Maternal Grandmother    Suicide Daughter           Tobacco Use    Smoking status: Never    Smokeless tobacco: Never   Substance and Sexual Activity    Alcohol use: No     Alcohol/week: 0.0 standard drinks of alcohol    Drug use: No    Sexual activity: Yes     Partners: Male     Birth control/protection: None     Comment:  19 years since 1999     Review of Systems   Constitutional:  Negative for activity change, chills and fever.   HENT:  Negative for sore throat.    Respiratory:  Negative for cough and shortness of breath.    Cardiovascular:  Negative for leg swelling.   Endocrine: Positive for cold intolerance.   Genitourinary:  Positive for hematuria.   Musculoskeletal:  Positive for back pain.   Skin: Negative.    Neurological: Negative.    Psychiatric/Behavioral: Negative.       Objective:     Vital Signs (Most Recent):  Temp: 97.4 °F (36.3 °C) (12/24/24 2348)  Pulse: 64 (12/24/24 2348)  Resp: 18 (12/24/24 2348)  BP: 119/75 (12/24/24 2348)  SpO2: 97 % (12/24/24 2348) Vital Signs (24h Range):  Temp:  [97.4 °F (36.3 °C)-98.4 °F (36.9 °C)] 97.4 °F (36.3 °C)  Pulse:  [61-88] 64  Resp:  [14-18] 18  SpO2:  [97 %-100 %] 97 %  BP: (118-138)/(60-80) 119/75     Weight: 83.9 kg (184 lb 15.5 oz)  Body mass index is 27.31 kg/m².     Physical Exam  Vitals and nursing note reviewed.   Constitutional:       General: She is not in acute distress.  HENT:      Head: Normocephalic and atraumatic.   Eyes:      General: No scleral icterus.  Cardiovascular:      Rate and Rhythm: Normal rate and regular rhythm.   Pulmonary:      Effort: Pulmonary effort is normal. No respiratory distress.      Breath sounds: No wheezing or rales.   Abdominal:      Palpations: Abdomen is soft.      Tenderness: There is right CVA tenderness.      Comments: Right flank nephrostomy tube in placed draining clear yellow urine.     LLQ - diverting urostomy with clear yellow urine output, no abdominal tenderness, surgical abdominal scars present.    Musculoskeletal:      Cervical back: Neck  "supple.      Right lower leg: No edema.      Left lower leg: No edema.   Skin:     General: Skin is warm and dry.   Neurological:      General: No focal deficit present.      Mental Status: She is alert and oriented to person, place, and time.                Significant Labs: All pertinent labs within the past 24 hours have been reviewed.  CBC:   Recent Labs   Lab 12/24/24  1443   WBC 7.98   HGB 12.5   HCT 40.9        CMP:   Recent Labs   Lab 12/24/24  1443      K 4.4      CO2 22*   GLU 86   BUN 15   CREATININE 1.3   CALCIUM 9.6   PROT 7.9   ALBUMIN 3.4*   BILITOT 0.3   ALKPHOS 117   AST 22   ALT 19   ANIONGAP 11     Lactic Acid: No results for input(s): "LACTATE" in the last 48 hours.  Magnesium: No results for input(s): "MG" in the last 48 hours.  Troponin: No results for input(s): "TROPONINI", "TROPONINIHS" in the last 48 hours.  TSH: No results for input(s): "TSH" in the last 4320 hours.  Urine Culture: No results for input(s): "LABURIN" in the last 48 hours.  Urine Studies:   Recent Labs   Lab 12/24/24  1812   COLORU [place]*   APPEARANCEUA Hazy*   PHUR 6.0   SPECGRAV >=1.030*   PROTEINUA 2+*   GLUCUA Negative   KETONESU Negative   BILIRUBINUA Negative   OCCULTUA 3+*   NITRITE Negative   LEUKOCYTESUR 2+*   RBCUA >100*   WBCUA >100*   BACTERIA Occasional   SQUAMEPITHEL 1   HYALINECASTS 0       Significant Imaging: I have reviewed all pertinent imaging results/findings within the past 24 hours.    CT ABDOMEN PELVIS WITH IV CONTRAST     CLINICAL HISTORY:  Nephrostomy catheter displacement;Dark urine in the nephrostomy tube and slight displacement.  Suture seems to have popped came out about 1 cm.;     TECHNIQUE:  Low dose axial images, sagittal and coronal reformations were obtained from the lung bases to the pubic symphysis following the IV administration of 100 mL of Omnipaque 350 .  Oral contrast was not administered.     COMPARISON:  08/09/2024     FINDINGS:  Abdomen:     - Lower thorax:   "   - Lung bases: No infiltrates and no nodules.     - Liver: The liver is mildly enlarged.  Mild diffuse fatty infiltration.  No focal abnormality.     - Gallbladder: No calcified gallstones.     - Bile Ducts: No evidence of intra or extra hepatic biliary ductal dilation.     - Spleen: Negative.     - Kidneys: The kidneys appear normal in size.  Percutaneous nephrostomy tube at the lower pole the right kidney.  There is mild inflammatory stranding of the right renal pelvis and right ureter could be associated with urinary tract infection.  Bilateral ureteral diversion procedure extending to a pouch near the midline.  Stable minimal anastomotic calcification at the junction of the right ureter and ileocolic anastomosis.     There is similar mild inflammatory stranding and thickening of the left renal pelvis and proximal left ureter.  Bilateral ureteral diversion procedure.  A urinary tract infection is a consideration.  Follow-up recommended.     No hydronephrosis bilaterally.  Nonobstructing stones at the lower pole of the left kidney.     Follow-up recommended.     - Adrenals: Unremarkable.     - Pancreas: No mass or peripancreatic fat stranding.     - Retroperitoneum:  Few subcentimeter periaortic retroperitoneal lymph nodes.     - Vascular: No abdominal aortic aneurysm.     - Abdominal wall:  Left lower quadrant urostomy.     Postop changes of small and large bowel in the pelvis.     Pelvis:     No pelvic mass, adenopathy, or free fluid.     Bowel/Mesentery:     No evidence of bowel obstruction or inflammation.     Bones:  No acute osseous abnormality and no suspicious lytic or blastic lesion.     Impression:     1. Findings suspicious for bilateral urinary tract infection.  See above comments.  Follow-up recommended.  2. Nonobstructing nephrolithiasis at the lower pole the left kidney.  3. Nephrostomy tube on the right.  4. Hepatomegaly with hepatic steatosis.  5. Postoperative changes.  6. This report was  flagged in Epic as abnormal.        Electronically signed by: Adilson Reinoso  Date:                                            12/24/2024      Radiology Post-Procedure Note     Pre Op Diagnosis: tube malfunction     Post Op Diagnosis: same     Procedure: PCN exchange      Procedure performed by: Susan Cueva MD     Written Informed Consent Obtained: Yes        Estimated Blood Loss: Minimal     Findings:   Right PCN exchange.      Patient tolerated procedure well.     Keep to bag drainage. Patient should return in 10-12 weeks for routine exchange.      Susan Cueva MD  Interventional Radiology     Assessment/Plan:     * Pyelonephritis of right kidney  -abnormal CT scan, UA with pyuria/hematuria, acute on chronic right flank pain   -hx of MDR urine culture results - s/p Meropenem and vancomycin in ED.    -Based on prior culture data will continue patient on Cefepime and Daptomycin, consult to ID for continued daptomycin use  -f/u pending urine culture & gram stain   -daily CBC.       Malfunction of nephrostomy tube  S/p right nephrostomy tube exchange by IR earlier today   -flush nephrostomy tube qshift with sterile saline  -routine drain care and monitoring urine output.   -      Essential hypertension  Patient's blood pressure range in the last 24 hours was: BP  Min: 118/60  Max: 138/68.The patient's inpatient anti-hypertensive regimen is listed below:  Current Antihypertensives       Plan  - BP is controlled, no changes needed to their regimen  - HOLD home amlodipine overnight given concerns for acute infection - restart when clinically appropriate.       VTE Risk Mitigation (From admission, onward)           Ordered     enoxaparin injection 40 mg  Every 24 hours         12/24/24 2155     IP VTE HIGH RISK PATIENT  Once         12/24/24 2155     Place sequential compression device  Until discontinued         12/24/24 2155                       On 12/25/2024, patient should be placed in hospital observation  services under my care.    Nathan Vu MD  Department of Hospital Medicine  [facility]

## 2024-12-26 LAB
ALBUMIN SERPL BCP-MCNC: 2.9 G/DL (ref 3.5–5.2)
ANION GAP SERPL CALC-SCNC: 10 MMOL/L (ref 8–16)
BASOPHILS # BLD AUTO: 0.04 K/UL (ref 0–0.2)
BASOPHILS NFR BLD: 0.7 % (ref 0–1.9)
BUN SERPL-MCNC: 13 MG/DL (ref 8–23)
CALCIUM SERPL-MCNC: 9.5 MG/DL (ref 8.7–10.5)
CHLORIDE SERPL-SCNC: 107 MMOL/L (ref 95–110)
CO2 SERPL-SCNC: 24 MMOL/L (ref 23–29)
CREAT SERPL-MCNC: 1.4 MG/DL (ref 0.5–1.4)
DIFFERENTIAL METHOD BLD: ABNORMAL
EOSINOPHIL # BLD AUTO: 0.2 K/UL (ref 0–0.5)
EOSINOPHIL NFR BLD: 2.8 % (ref 0–8)
ERYTHROCYTE [DISTWIDTH] IN BLOOD BY AUTOMATED COUNT: 15.3 % (ref 11.5–14.5)
EST. GFR  (NO RACE VARIABLE): 42.8 ML/MIN/1.73 M^2
FOLATE SERPL-MCNC: 10 NG/ML (ref 4–24)
GLUCOSE SERPL-MCNC: 91 MG/DL (ref 70–110)
HCT VFR BLD AUTO: 38.5 % (ref 37–48.5)
HGB BLD-MCNC: 11.9 G/DL (ref 12–16)
IMM GRANULOCYTES # BLD AUTO: 0.02 K/UL (ref 0–0.04)
IMM GRANULOCYTES NFR BLD AUTO: 0.3 % (ref 0–0.5)
LYMPHOCYTES # BLD AUTO: 0.9 K/UL (ref 1–4.8)
LYMPHOCYTES NFR BLD: 14.4 % (ref 18–48)
MAGNESIUM SERPL-MCNC: 1.8 MG/DL (ref 1.6–2.6)
MCH RBC QN AUTO: 27.5 PG (ref 27–31)
MCHC RBC AUTO-ENTMCNC: 30.9 G/DL (ref 32–36)
MCV RBC AUTO: 89 FL (ref 82–98)
MONOCYTES # BLD AUTO: 0.6 K/UL (ref 0.3–1)
MONOCYTES NFR BLD: 10 % (ref 4–15)
NEUTROPHILS # BLD AUTO: 4.4 K/UL (ref 1.8–7.7)
NEUTROPHILS NFR BLD: 71.8 % (ref 38–73)
NRBC BLD-RTO: 0 /100 WBC
PHOSPHATE SERPL-MCNC: 3.3 MG/DL (ref 2.7–4.5)
PLATELET # BLD AUTO: 251 K/UL (ref 150–450)
PMV BLD AUTO: 10.3 FL (ref 9.2–12.9)
POTASSIUM SERPL-SCNC: 3.9 MMOL/L (ref 3.5–5.1)
RBC # BLD AUTO: 4.33 M/UL (ref 4–5.4)
SODIUM SERPL-SCNC: 141 MMOL/L (ref 136–145)
VIT B12 SERPL-MCNC: 289 PG/ML (ref 210–950)
WBC # BLD AUTO: 6.1 K/UL (ref 3.9–12.7)

## 2024-12-26 PROCEDURE — 82746 ASSAY OF FOLIC ACID SERUM: CPT | Performed by: HOSPITALIST

## 2024-12-26 PROCEDURE — 85025 COMPLETE CBC W/AUTO DIFF WBC: CPT | Performed by: HOSPITALIST

## 2024-12-26 PROCEDURE — 25000003 PHARM REV CODE 250: Performed by: HOSPITALIST

## 2024-12-26 PROCEDURE — 25000003 PHARM REV CODE 250: Performed by: STUDENT IN AN ORGANIZED HEALTH CARE EDUCATION/TRAINING PROGRAM

## 2024-12-26 PROCEDURE — 80069 RENAL FUNCTION PANEL: CPT | Performed by: HOSPITALIST

## 2024-12-26 PROCEDURE — 99233 SBSQ HOSP IP/OBS HIGH 50: CPT | Mod: ,,, | Performed by: STUDENT IN AN ORGANIZED HEALTH CARE EDUCATION/TRAINING PROGRAM

## 2024-12-26 PROCEDURE — 63600175 PHARM REV CODE 636 W HCPCS: Performed by: HOSPITALIST

## 2024-12-26 PROCEDURE — 82607 VITAMIN B-12: CPT | Performed by: HOSPITALIST

## 2024-12-26 PROCEDURE — 36415 COLL VENOUS BLD VENIPUNCTURE: CPT | Performed by: HOSPITALIST

## 2024-12-26 PROCEDURE — G0378 HOSPITAL OBSERVATION PER HR: HCPCS

## 2024-12-26 PROCEDURE — 96372 THER/PROPH/DIAG INJ SC/IM: CPT | Performed by: HOSPITALIST

## 2024-12-26 PROCEDURE — 83735 ASSAY OF MAGNESIUM: CPT | Performed by: HOSPITALIST

## 2024-12-26 PROCEDURE — 63600175 PHARM REV CODE 636 W HCPCS: Performed by: STUDENT IN AN ORGANIZED HEALTH CARE EDUCATION/TRAINING PROGRAM

## 2024-12-26 PROCEDURE — 11000001 HC ACUTE MED/SURG PRIVATE ROOM

## 2024-12-26 PROCEDURE — 25000003 PHARM REV CODE 250: Performed by: FAMILY MEDICINE

## 2024-12-26 RX ORDER — CYANOCOBALAMIN (VITAMIN B-12) 250 MCG
1000 TABLET ORAL DAILY
Status: DISCONTINUED | OUTPATIENT
Start: 2024-12-26 | End: 2024-12-28 | Stop reason: HOSPADM

## 2024-12-26 RX ADMIN — MIRTAZAPINE 30 MG: 30 TABLET, FILM COATED ORAL at 08:12

## 2024-12-26 RX ADMIN — OXYCODONE AND ACETAMINOPHEN 1 TABLET: 10; 325 TABLET ORAL at 09:12

## 2024-12-26 RX ADMIN — PIPERACILLIN SODIUM AND TAZOBACTAM SODIUM 4.5 G: 4; .5 INJECTION, POWDER, FOR SOLUTION INTRAVENOUS at 12:12

## 2024-12-26 RX ADMIN — PIPERACILLIN SODIUM AND TAZOBACTAM SODIUM 4.5 G: 4; .5 INJECTION, POWDER, FOR SOLUTION INTRAVENOUS at 08:12

## 2024-12-26 RX ADMIN — Medication 1 TABLET: at 08:12

## 2024-12-26 RX ADMIN — FERROUS SULFATE TAB EC 325 MG (65 MG FE EQUIVALENT) 1 EACH: 325 (65 FE) TABLET DELAYED RESPONSE at 08:12

## 2024-12-26 RX ADMIN — ENOXAPARIN SODIUM 40 MG: 40 INJECTION SUBCUTANEOUS at 04:12

## 2024-12-26 RX ADMIN — CYANOCOBALAMIN TAB 250 MCG 1000 MCG: 250 TAB at 10:12

## 2024-12-26 RX ADMIN — Medication 1 TABLET: at 10:12

## 2024-12-26 RX ADMIN — PIPERACILLIN SODIUM AND TAZOBACTAM SODIUM 4.5 G: 4; .5 INJECTION, POWDER, FOR SOLUTION INTRAVENOUS at 05:12

## 2024-12-26 RX ADMIN — FERROUS SULFATE TAB EC 325 MG (65 MG FE EQUIVALENT) 1 EACH: 325 (65 FE) TABLET DELAYED RESPONSE at 10:12

## 2024-12-26 NOTE — PROGRESS NOTES
[facility]  Infectious Disease  Progress Note    Patient Name: Edita Riley  MRN: [id number]  Admission Date: 12/24/2024  Length of Stay: 0 days  Attending Physician: Earl Fisher III*  Primary Care Provider: Hernan Jack MD    Isolation Status: No active isolations  Assessment/Plan:      ID  * Pyelonephritis of right kidney  Edita Riley is a 58 year old woman with cervical cancer s/p XRT, bilateral ureteral strictures with obstructive uropathy now s/p transverse colon conduit, right nephrostomy tube, and ileostomy who was admitted 12/24 for nephrostomy tube malfunction. Began having acute on chronic worsening of her right flank pain. She reports fevers at home. Had right percutaneous nephrostomy tube exchanged day of admission. CT A/P with findings suspicious for bilateral urinary tract infection. Urine culture with multiple organisms, asking micro to work up.     Recommendations  - continue pip-tazo 4.5 q8  - asked micro to work up multiple organisms on urine culture, will follow            Above discussed with primary team.     Time: 50 minutes   50% of time spent on face-to-face counseling and coordination of care. Counseling included review of test results, diagnosis, and treatment plan with patient and/or family.  I have reviewed hospital notes from HM service and other specialty providers as well as outside medical records. I have also reviewed CBC, CMP/BMP,  cultures and imaging with my interpretation as documented. Patient is high risk of morbidity, on antibiotics requiring intensive monitoring for toxicity.     Anticipated Disposition: TBD    Thank you for your consult. I will follow-up with patient. Please contact us if you have any additional questions.    Charito Arroyo MD  Infectious Disease  [facility]    Subjective:     Principal Problem:Pyelonephritis of right kidney    HPI: Edita Riley is a 58 year old woman with cervical cancer s/p  XRT, bilateral ureteral strictures with obstructive uropathy now s/p transverse colon conduit, right nephrostomy tube, and ileostomy who was admitted 12/24 for nephrostomy tube malfunction. Began having acute on chronic worsening of her right flank pain. She reports fevers at home. Had right percutaneous nephrostomy tube exchanged day of admission. CT A/P with findings suspicious for bilateral urinary tract infection. Urine culture pending. Symptoms improved since admission, but still with pain.      Interval History: had about 3-4 loose stools since yesterday AM. Abdominal and flank pain no worse.     Review of Systems   All other systems reviewed and are negative.    Objective:     Vital Signs (Most Recent):  Temp: 97.9 °F (36.6 °C) (12/26/24 1121)  Pulse: 68 (12/26/24 1121)  Resp: 18 (12/26/24 1121)  BP: 106/70 (12/26/24 1121)  SpO2: 97 % (12/26/24 1121) Vital Signs (24h Range):  Temp:  [97.9 °F (36.6 °C)-98.5 °F (36.9 °C)] 97.9 °F (36.6 °C)  Pulse:  [68-82] 68  Resp:  [18] 18  SpO2:  [97 %-98 %] 97 %  BP: (103-134)/(70-80) 106/70     Weight: 83.9 kg (184 lb 15.5 oz)  Body mass index is 27.31 kg/m².    Estimated Creatinine Clearance: 48.8 mL/min (based on SCr of 1.4 mg/dL).     Physical Exam  Vitals reviewed.   Constitutional:       Appearance: Normal appearance. She is not ill-appearing.   Pulmonary:      Effort: Pulmonary effort is normal. No respiratory distress.   Abdominal:      Palpations: Abdomen is soft.      Tenderness: There is abdominal tenderness.      Comments: LLQ urostomy, R flank nephrostomy tube   Skin:     General: Skin is warm and dry.      Findings: No rash.   Neurological:      Mental Status: She is alert.   Psychiatric:         Mood and Affect: Mood normal.          Significant Labs:   Microbiology Results (last 7 days)       Procedure Component Value Units Date/Time    Urine culture [[id number]]  (Abnormal) Collected: 12/24/24 1812    Order Status: Completed Specimen: Urine Updated:  12/26/24 1233     Urine Culture, Routine Multiple organisms isolated. None in predominance. Repeat if clinically      necessary.     Comment: Previous comment was modified by [email or web address] [email or web address] at 12:33 on 12/26/2024   Multiple organisms isolated. None in predominance. Repeat   ifclinically necessary.           ENTEROCOCCUS SPECIES  >100,000 cfu/ml  Identification and susceptibility pending        GRAM NEGATIVE BALAJI  10,000 - 49,999 cfu/ml  Identification and susceptibility pending      Narrative:      Specimen Source->Urine    Gram stain [[id number]] Collected: 12/24/24 1812    Order Status: Completed Specimen: Urine from Clean Catch Updated: 12/25/24 0006     Gram Stain Result Rare WBC's      Few Gram negative rods      Rare Gram positive cocci    Narrative:      add on test urine gram per nevaeh jacobo  12/24/2024  23:33     Gram stain [[id number]]     Order Status: Completed Specimen: Urine             Significant Imaging: I have reviewed all pertinent imaging results/findings within the past 24 hours.

## 2024-12-26 NOTE — SUBJECTIVE & OBJECTIVE
Subjective/Interval History:  Pt reports ongoing right-sided flank pain.  Nephrostomy tube functioning properly.  Denies any chest pain, SOB, abdominal pain, nausea, vomiting.  Tolerating diet.    Labs personally reviewed:  WBC 6.1, Hgb 11.9, Na 141, K 3.9, Cr 1.4    ID consulted: cont Zosyn.  Asked micro to workup multiple organisms on urine culture.    Objective:     Vital Signs (Most Recent):  Temp: 97.9 °F (36.6 °C) (12/26/24 1555)  Pulse: 69 (12/26/24 1555)  Resp: 18 (12/26/24 1555)  BP: 113/72 (12/26/24 1555)  SpO2: 97 % (12/26/24 1555) Vital Signs (24h Range):  Temp:  [97.9 °F (36.6 °C)-98.5 °F (36.9 °C)] 97.9 °F (36.6 °C)  Pulse:  [68-82] 69  Resp:  [18] 18  SpO2:  [97 %-98 %] 97 %  BP: (103-134)/(70-80) 113/72     Weight: 83.9 kg (184 lb 15.5 oz)  Body mass index is 27.31 kg/m².    Intake/Output Summary (Last 24 hours) at 12/26/2024 1925  Last data filed at 12/26/2024 1839  Gross per 24 hour   Intake --   Output 1700 ml   Net -1700 ml         Physical Exam  Constitutional:       General: She is not in acute distress.  Cardiovascular:      Rate and Rhythm: Normal rate and regular rhythm.      Heart sounds: Normal heart sounds. No murmur heard.  Pulmonary:      Effort: Pulmonary effort is normal. No respiratory distress.      Breath sounds: No wheezing.   Abdominal:      General: Bowel sounds are normal. There is no distension.      Tenderness: There is abdominal tenderness (Right flank).      Comments: Right flank nephrostomy tube in placed draining clear yellow urine.      LLQ - diverting urostomy with clear yellow urine output, no abdominal tenderness, surgical abdominal scars present     Musculoskeletal:      Right lower leg: No edema.      Left lower leg: No edema.   Neurological:      Mental Status: She is alert and oriented to person, place, and time.   Psychiatric:         Mood and Affect: Mood normal.             Significant Labs: All pertinent labs within the past 24 hours have been  reviewed.    Significant Imaging: I have reviewed all pertinent imaging results/findings within the past 24 hours.

## 2024-12-26 NOTE — PLAN OF CARE
[facility]  Discharge Reassessment    Primary Care Provider: Hernan Jack MD    Expected Discharge Date: 12/27/2024    Reassessment (most recent)       Discharge Reassessment - 12/26/24 1122          Discharge Reassessment    Assessment Type Discharge Planning Reassessment (P)      Did the patient's condition or plan change since previous assessment? No (P)      Communicated OK with patient/caregiver Date not available/Unable to determine (P)      Discharge Plan A Home with family (P)      Discharge Plan B Home (P)      DME Needed Upon Discharge  none (P)      Transition of Care Barriers Underinsured (P)      Why the patient remains in the hospital Requires continued medical care (P)                      SW noting pt is in Observation Status. Admitted: 12/24/24. Secure Chat communication including Dr. Fisher, pt's unit nurse & Utilization nurse. Waiting on urine culture results. ID to determine pt's antibiotics.     Discharge Plan A and Plan B have been determined by review of patient's clinical status, future medical and therapeutic needs, and coverage/benefits for post-acute care in coordination with multidisciplinary team members.     Daisy Pinon LMSW

## 2024-12-26 NOTE — SUBJECTIVE & OBJECTIVE
Interval History: had about 3-4 loose stools since yesterday AM. Abdominal and flank pain no worse.     Review of Systems   All other systems reviewed and are negative.    Objective:     Vital Signs (Most Recent):  Temp: 97.9 °F (36.6 °C) (12/26/24 1121)  Pulse: 68 (12/26/24 1121)  Resp: 18 (12/26/24 1121)  BP: 106/70 (12/26/24 1121)  SpO2: 97 % (12/26/24 1121) Vital Signs (24h Range):  Temp:  [97.9 °F (36.6 °C)-98.5 °F (36.9 °C)] 97.9 °F (36.6 °C)  Pulse:  [68-82] 68  Resp:  [18] 18  SpO2:  [97 %-98 %] 97 %  BP: (103-134)/(70-80) 106/70     Weight: 83.9 kg (184 lb 15.5 oz)  Body mass index is 27.31 kg/m².    Estimated Creatinine Clearance: 48.8 mL/min (based on SCr of 1.4 mg/dL).     Physical Exam  Vitals reviewed.   Constitutional:       Appearance: Normal appearance. She is not ill-appearing.   Pulmonary:      Effort: Pulmonary effort is normal. No respiratory distress.   Abdominal:      Palpations: Abdomen is soft.      Tenderness: There is abdominal tenderness.      Comments: LLQ urostomy, R flank nephrostomy tube   Skin:     General: Skin is warm and dry.      Findings: No rash.   Neurological:      Mental Status: She is alert.   Psychiatric:         Mood and Affect: Mood normal.          Significant Labs:   Microbiology Results (last 7 days)       Procedure Component Value Units Date/Time    Urine culture [[id number]]  (Abnormal) Collected: 12/24/24 1812    Order Status: Completed Specimen: Urine Updated: 12/26/24 1233     Urine Culture, Routine Multiple organisms isolated. None in predominance. Repeat if clinically      necessary.     Comment: Previous comment was modified by [email or web address] JCV3 at 12:33 on 12/26/2024   Multiple organisms isolated. None in predominance. Repeat   ifclinically necessary.           ENTEROCOCCUS SPECIES  >100,000 cfu/ml  Identification and susceptibility pending        GRAM NEGATIVE BALAJI  10,000 - 49,999 cfu/ml  Identification and susceptibility pending      Narrative:      Specimen  Source->Urine    Gram stain [[id number]] Collected: 12/24/24 1812    Order Status: Completed Specimen: Urine from Clean Catch Updated: 12/25/24 0006     Gram Stain Result Rare WBC's      Few Gram negative rods      Rare Gram positive cocci    Narrative:      add on test urine gram per nevaeh jacobo  12/24/2024  23:33     Gram stain [[id number]]     Order Status: Completed Specimen: Urine             Significant Imaging: I have reviewed all pertinent imaging results/findings within the past 24 hours.

## 2024-12-26 NOTE — PROGRESS NOTES
[facility] Medicine  Progress Note    Patient Name: Edita Riley  MRN: [id number]  Patient Class: OP- Observation   Admission Date: 12/24/2024  Length of Stay: 0 days  Attending Physician: Earl Fisher III*  Primary Care Provider: Hernan Jack MD        Subjective     Principal Problem:Pyelonephritis of right kidney        HPI:  57 y/o AAF hx of Cervical Cancer s/p XRT & Bilateral Ureteral strictures with obstructive uropathy now s/p transverse colon conduit, s/p right  Nephrostomy tube, s/p Ileostomy, hx of MDR UTI/pyelonephritis presents to the ED with complaints of nephrostomy tube malfunction.     She has had chronic back and right flank pain, reports some acute on chronic right flank/back pain.  In ED patient with new findings of red/brown tinted urine, concern of bleeding at site of nephrostomy tube insertion and also leaking in nephrostomy tubing.  She is found with right nephrostomy tube malfunction, in the ED, IR was consulted and taken patient in the afternoon for right nephrostomy tube exchange, successful exchange performed.  Her post-exchange UA with concerns of pyuria/hematuria and CT scan performed she is found with enhancement of Right kidney concerning for pyelonephritis.  She is referred for hospital medicine admission for management.     She denies any fevers/chills, has normal WBC count, her urine s/p tube exchange is clear and yellow in color.  She has hx of MDR UTI, Received Meropenem 1gram in ED and IV vancomycin + 500cc Normal saline bolus.       Overview/Hospital Course:  Updated hospital course pending    Subjective/Interval History:  Pt reports ongoing right-sided flank pain.  Nephrostomy tube functioning properly.  Denies any chest pain, SOB, abdominal pain, nausea, vomiting.  Tolerating diet.    Labs personally reviewed:  WBC 6.1, Hgb 11.9, Na 141, K 3.9, Cr 1.4    ID consulted: cont Zosyn.  Asked micro to workup multiple organisms on urine  culture.    Objective:     Vital Signs (Most Recent):  Temp: 97.9 °F (36.6 °C) (12/26/24 1555)  Pulse: 69 (12/26/24 1555)  Resp: 18 (12/26/24 1555)  BP: 113/72 (12/26/24 1555)  SpO2: 97 % (12/26/24 1555) Vital Signs (24h Range):  Temp:  [97.9 °F (36.6 °C)-98.5 °F (36.9 °C)] 97.9 °F (36.6 °C)  Pulse:  [68-82] 69  Resp:  [18] 18  SpO2:  [97 %-98 %] 97 %  BP: (103-134)/(70-80) 113/72     Weight: 83.9 kg (184 lb 15.5 oz)  Body mass index is 27.31 kg/m².    Intake/Output Summary (Last 24 hours) at 12/26/2024 1925  Last data filed at 12/26/2024 1839  Gross per 24 hour   Intake --   Output 1700 ml   Net -1700 ml         Physical Exam  Constitutional:       General: She is not in acute distress.  Cardiovascular:      Rate and Rhythm: Normal rate and regular rhythm.      Heart sounds: Normal heart sounds. No murmur heard.  Pulmonary:      Effort: Pulmonary effort is normal. No respiratory distress.      Breath sounds: No wheezing.   Abdominal:      General: Bowel sounds are normal. There is no distension.      Tenderness: There is abdominal tenderness (Right flank).      Comments: Right flank nephrostomy tube in placed draining clear yellow urine.      LLQ - diverting urostomy with clear yellow urine output, no abdominal tenderness, surgical abdominal scars present     Musculoskeletal:      Right lower leg: No edema.      Left lower leg: No edema.   Neurological:      Mental Status: She is alert and oriented to person, place, and time.   Psychiatric:         Mood and Affect: Mood normal.             Significant Labs: All pertinent labs within the past 24 hours have been reviewed.    Significant Imaging: I have reviewed all pertinent imaging results/findings within the past 24 hours.    Assessment and Plan     * Pyelonephritis of right kidney  No sepsis criteria but pt feels like she has recurrent infection.  abnormal CT scan, UA with pyuria/hematuria, acute on chronic right flank pain   hx of MDR urine culture results - s/p  Meropenem and vancomycin in ED.    -ID consulted: cont Zosyn.  Follow up urine culture (ID asked micro to workup multiple organisms on urine culture).    Malfunction of nephrostomy tube  S/p right nephrostomy tube exchanged by IR earlier at admission   -flush nephrostomy tube qshift with sterile saline  -routine drain care and monitoring urine output.     Presence of urostomy  Routine wound care      Anemia  Anemia is likely due to chronic blood loss. Most recent hemoglobin and hematocrit are listed below.  Recent Labs     12/24/24  1443 12/25/24  0357 12/26/24  0224   HGB 12.5 11.7* 11.9*   HCT 40.9 39.7 38.5       Plan  - Monitor serial CBC  - Transfuse PRBC if patient becomes hemodynamically unstable, symptomatic or H/H drops below 7/21.  -- B12 low normal, supplement.  Folate normal    Essential hypertension  HOLD home amlodipine as BP is low normal  Resume when clinically appropriate    CKD (chronic kidney disease), stage III  Cr baseline approx 1-1.5; stable  Avoid nephrotoxins, renally dose meds  Monitor a.m. labs    Vitamin D deficiency  Noted in labs,  -- start vit d      Recurrent major depressive disorder, in partial remission  Patient has persistent depression which is severe and is currently controlled. Will Continue anti-depressant medications. We will not consult psychiatry at this time. Patient does not display psychosis at this time. Continue to monitor closely and adjust plan of care as needed.          VTE Risk Mitigation (From admission, onward)           Ordered     enoxaparin injection 40 mg  Every 24 hours         12/24/24 2155     IP VTE HIGH RISK PATIENT  Once         12/24/24 2155     Place sequential compression device  Until discontinued         12/24/24 2155                    Discharge Planning   OK: 12/27/2024     Code Status: Full Code   Medical Readiness for Discharge Date:   Discharge Plan A: Home with family                        Earl Fisher III, MD  Department of Hospital  Medicine   [facility]

## 2024-12-26 NOTE — ASSESSMENT & PLAN NOTE
Edita Riley is a 58 year old woman with cervical cancer s/p XRT, bilateral ureteral strictures with obstructive uropathy now s/p transverse colon conduit, right nephrostomy tube, and ileostomy who was admitted 12/24 for nephrostomy tube malfunction. Began having acute on chronic worsening of her right flank pain. She reports fevers at home. Had right percutaneous nephrostomy tube exchanged day of admission. CT A/P with findings suspicious for bilateral urinary tract infection. Urine culture with multiple organisms, asking micro to work up.     Recommendations  - continue pip-tazo 4.5 q8  - asked micro to work up multiple organisms on urine culture, will follow

## 2024-12-27 LAB
ALBUMIN SERPL BCP-MCNC: 2.8 G/DL (ref 3.5–5.2)
ANION GAP SERPL CALC-SCNC: 10 MMOL/L (ref 8–16)
BASOPHILS # BLD AUTO: 0.03 K/UL (ref 0–0.2)
BASOPHILS NFR BLD: 0.5 % (ref 0–1.9)
BUN SERPL-MCNC: 18 MG/DL (ref 8–23)
CALCIUM SERPL-MCNC: 8.9 MG/DL (ref 8.7–10.5)
CHLORIDE SERPL-SCNC: 108 MMOL/L (ref 95–110)
CO2 SERPL-SCNC: 24 MMOL/L (ref 23–29)
CREAT SERPL-MCNC: 1.4 MG/DL (ref 0.5–1.4)
DIFFERENTIAL METHOD BLD: ABNORMAL
EOSINOPHIL # BLD AUTO: 0.2 K/UL (ref 0–0.5)
EOSINOPHIL NFR BLD: 4.1 % (ref 0–8)
ERYTHROCYTE [DISTWIDTH] IN BLOOD BY AUTOMATED COUNT: 15.5 % (ref 11.5–14.5)
EST. GFR  (NO RACE VARIABLE): 42.8 ML/MIN/1.73 M^2
GLUCOSE SERPL-MCNC: 103 MG/DL (ref 70–110)
HCT VFR BLD AUTO: 38.5 % (ref 37–48.5)
HGB BLD-MCNC: 11.5 G/DL (ref 12–16)
IMM GRANULOCYTES # BLD AUTO: 0.01 K/UL (ref 0–0.04)
IMM GRANULOCYTES NFR BLD AUTO: 0.2 % (ref 0–0.5)
LYMPHOCYTES # BLD AUTO: 1.5 K/UL (ref 1–4.8)
LYMPHOCYTES NFR BLD: 26.6 % (ref 18–48)
MAGNESIUM SERPL-MCNC: 1.8 MG/DL (ref 1.6–2.6)
MCH RBC QN AUTO: 27.2 PG (ref 27–31)
MCHC RBC AUTO-ENTMCNC: 29.9 G/DL (ref 32–36)
MCV RBC AUTO: 91 FL (ref 82–98)
MONOCYTES # BLD AUTO: 0.9 K/UL (ref 0.3–1)
MONOCYTES NFR BLD: 15.3 % (ref 4–15)
NEUTROPHILS # BLD AUTO: 3 K/UL (ref 1.8–7.7)
NEUTROPHILS NFR BLD: 53.3 % (ref 38–73)
NRBC BLD-RTO: 0 /100 WBC
PHOSPHATE SERPL-MCNC: 3.6 MG/DL (ref 2.7–4.5)
PLATELET # BLD AUTO: 252 K/UL (ref 150–450)
PMV BLD AUTO: 10.2 FL (ref 9.2–12.9)
POTASSIUM SERPL-SCNC: 3.7 MMOL/L (ref 3.5–5.1)
RBC # BLD AUTO: 4.23 M/UL (ref 4–5.4)
SODIUM SERPL-SCNC: 142 MMOL/L (ref 136–145)
WBC # BLD AUTO: 5.61 K/UL (ref 3.9–12.7)

## 2024-12-27 PROCEDURE — 85025 COMPLETE CBC W/AUTO DIFF WBC: CPT | Performed by: HOSPITALIST

## 2024-12-27 PROCEDURE — 99233 SBSQ HOSP IP/OBS HIGH 50: CPT | Mod: ,,, | Performed by: STUDENT IN AN ORGANIZED HEALTH CARE EDUCATION/TRAINING PROGRAM

## 2024-12-27 PROCEDURE — 80069 RENAL FUNCTION PANEL: CPT | Performed by: HOSPITALIST

## 2024-12-27 PROCEDURE — 63600175 PHARM REV CODE 636 W HCPCS: Performed by: STUDENT IN AN ORGANIZED HEALTH CARE EDUCATION/TRAINING PROGRAM

## 2024-12-27 PROCEDURE — 36415 COLL VENOUS BLD VENIPUNCTURE: CPT | Performed by: HOSPITALIST

## 2024-12-27 PROCEDURE — 25000003 PHARM REV CODE 250: Performed by: FAMILY MEDICINE

## 2024-12-27 PROCEDURE — 63600175 PHARM REV CODE 636 W HCPCS: Performed by: HOSPITALIST

## 2024-12-27 PROCEDURE — 11000001 HC ACUTE MED/SURG PRIVATE ROOM

## 2024-12-27 PROCEDURE — 25000003 PHARM REV CODE 250: Performed by: STUDENT IN AN ORGANIZED HEALTH CARE EDUCATION/TRAINING PROGRAM

## 2024-12-27 PROCEDURE — 83735 ASSAY OF MAGNESIUM: CPT | Performed by: HOSPITALIST

## 2024-12-27 PROCEDURE — 25000003 PHARM REV CODE 250: Performed by: HOSPITALIST

## 2024-12-27 RX ADMIN — Medication 1 TABLET: at 08:12

## 2024-12-27 RX ADMIN — FERROUS SULFATE TAB EC 325 MG (65 MG FE EQUIVALENT) 1 EACH: 325 (65 FE) TABLET DELAYED RESPONSE at 09:12

## 2024-12-27 RX ADMIN — ENOXAPARIN SODIUM 40 MG: 40 INJECTION SUBCUTANEOUS at 04:12

## 2024-12-27 RX ADMIN — PIPERACILLIN SODIUM AND TAZOBACTAM SODIUM 4.5 G: 4; .5 INJECTION, POWDER, FOR SOLUTION INTRAVENOUS at 09:12

## 2024-12-27 RX ADMIN — FERROUS SULFATE TAB EC 325 MG (65 MG FE EQUIVALENT) 1 EACH: 325 (65 FE) TABLET DELAYED RESPONSE at 08:12

## 2024-12-27 RX ADMIN — MIRTAZAPINE 30 MG: 30 TABLET, FILM COATED ORAL at 09:12

## 2024-12-27 RX ADMIN — PIPERACILLIN SODIUM AND TAZOBACTAM SODIUM 4.5 G: 4; .5 INJECTION, POWDER, FOR SOLUTION INTRAVENOUS at 01:12

## 2024-12-27 RX ADMIN — PIPERACILLIN SODIUM AND TAZOBACTAM SODIUM 4.5 G: 4; .5 INJECTION, POWDER, FOR SOLUTION INTRAVENOUS at 05:12

## 2024-12-27 RX ADMIN — OXYCODONE AND ACETAMINOPHEN 1 TABLET: 10; 325 TABLET ORAL at 09:12

## 2024-12-27 RX ADMIN — Medication 1 TABLET: at 09:12

## 2024-12-27 RX ADMIN — CYANOCOBALAMIN TAB 250 MCG 1000 MCG: 250 TAB at 08:12

## 2024-12-27 NOTE — ASSESSMENT & PLAN NOTE
-- Monitor serial CBC  -- Transfuse PRBC if patient becomes hemodynamically unstable, symptomatic or H/H drops below 7/21.  -- B12 low normal, supplement.  Folate normal

## 2024-12-27 NOTE — ASSESSMENT & PLAN NOTE
Anemia is likely due to chronic blood loss. Most recent hemoglobin and hematocrit are listed below.  Recent Labs     12/24/24  1443 12/25/24  0357 12/26/24  0224   HGB 12.5 11.7* 11.9*   HCT 40.9 39.7 38.5       Plan  - Monitor serial CBC  - Transfuse PRBC if patient becomes hemodynamically unstable, symptomatic or H/H drops below 7/21.  -- B12 low normal, supplement.  Folate normal

## 2024-12-27 NOTE — PLAN OF CARE
Pt not medically ready for d/c. Pending final recs from ID.  SW met with pt to discuss discharge recommendations. Pt reported she is independent and ambulatory with her mobility and ADLs.  Pt's  will provide transportation home once medically ready.    Discharge Plan A and Plan B have been determined by review of patient's clinical status, future medical and therapeutic needs, and coverage/benefits for post-acute care in coordination with multidisciplinary team members.     12/27/24 1620   Post-Acute Status   Post-Acute Authorization Other   Coverage [place] Medicaid   Other Status See Comments  (Pending final recs from ID)   Discharge Delays None known at this time   Discharge Plan   Discharge Plan A Home;Home with family   Discharge Plan B Home     Jayda Pineda LMSW  Part-Time-  [facility]  Ext. [phone]

## 2024-12-27 NOTE — ASSESSMENT & PLAN NOTE
S/p right nephrostomy tube exchanged by IR earlier at admission   -flush nephrostomy tube qshift with sterile saline  -routine drain care and monitoring urine output.

## 2024-12-27 NOTE — PROGRESS NOTES
[facility] Medicine  Progress Note    Patient Name: Edita Riley  MRN: [id number]  Patient Class: IP- Inpatient   Admission Date: 12/24/2024  Length of Stay: 0 days  Attending Physician: Earl Fisher III*  Primary Care Provider: Hernan Jack MD        Subjective     Principal Problem:Pyelonephritis of right kidney        HPI:  59 y/o AAF hx of Cervical Cancer s/p XRT & Bilateral Ureteral strictures with obstructive uropathy now s/p transverse colon conduit, s/p right  Nephrostomy tube, s/p Ileostomy, hx of MDR UTI/pyelonephritis presents to the ED with complaints of nephrostomy tube malfunction.     She has had chronic back and right flank pain, reports some acute on chronic right flank/back pain.  In ED patient with new findings of red/brown tinted urine, concern of bleeding at site of nephrostomy tube insertion and also leaking in nephrostomy tubing.  She is found with right nephrostomy tube malfunction, in the ED, IR was consulted and taken patient in the afternoon for right nephrostomy tube exchange, successful exchange performed.  Her post-exchange UA with concerns of pyuria/hematuria and CT scan performed she is found with enhancement of Right kidney concerning for pyelonephritis.  She is referred for hospital medicine admission for management.     She denies any fevers/chills, has normal WBC count, her urine s/p tube exchange is clear and yellow in color.  She has hx of MDR UTI, Received Meropenem 1gram in ED and IV vancomycin + 500cc Normal saline bolus.       Overview/Hospital Course:  Updated hospital course pending    Subjective/Interval History:  Pt says right-sided flank pain has improved.  Noted dark blood-tinged urine and nephrostomy bag.  Denied any abdominal pain, nausea, vomiting, fever, chills    Labs personally reviewed:  WBC 5.61, Hgb 11.5, Na 142, Cr 1.4    Discussed with Interventional Radiology:  Recommend monitoring urine output and monitor for  developing rob blood.  If urine output slows or pt develops rob bleeding, will get KUB to assess positioning and discuss further with IR    Objective:     Vital Signs (Most Recent):  Temp: 98 °F (36.7 °C) (12/27/24 1118)  Pulse: 70 (12/27/24 1118)  Resp: 18 (12/27/24 1118)  BP: 114/76 (12/27/24 1118)  SpO2: 96 % (12/27/24 1118) Vital Signs (24h Range):  Temp:  [97.5 °F (36.4 °C)-98.1 °F (36.7 °C)] 98 °F (36.7 °C)  Pulse:  [70-77] 70  Resp:  [17-18] 18  SpO2:  [96 %-97 %] 96 %  BP: (100-114)/(65-76) 114/76     Weight: 83.9 kg (184 lb 15.5 oz)  Body mass index is 27.31 kg/m².    Intake/Output Summary (Last 24 hours) at 12/27/2024 1556  Last data filed at 12/27/2024 0546  Gross per 24 hour   Intake 240 ml   Output 800 ml   Net -560 ml         Physical Exam  Constitutional:       General: She is not in acute distress.  Cardiovascular:      Rate and Rhythm: Normal rate and regular rhythm.      Heart sounds: Normal heart sounds. No murmur heard.  Pulmonary:      Effort: Pulmonary effort is normal. No respiratory distress.      Breath sounds: No wheezing.   Abdominal:      General: Bowel sounds are normal. There is no distension.      Tenderness: There is abdominal tenderness (Right flank).      Comments: Right flank nephrostomy tube in placed draining clear yellow urine.      LLQ - diverting urostomy with clear yellow urine output, no abdominal tenderness, surgical abdominal scars present     Musculoskeletal:      Right lower leg: No edema.      Left lower leg: No edema.   Neurological:      Mental Status: She is alert and oriented to person, place, and time.   Psychiatric:         Mood and Affect: Mood normal.             Significant Labs: All pertinent labs within the past 24 hours have been reviewed.    Significant Imaging: I have reviewed all pertinent imaging results/findings within the past 24 hours.      Assessment and Plan     * Pyelonephritis of right kidney  No sepsis criteria but pt feels like she has  recurrent infection.  abnormal CT scan, UA with pyuria/hematuria, acute on chronic right flank pain   hx of MDR urine culture results - s/p Meropenem and vancomycin in ED.    -ID consulted: cont Zosyn.  Follow up urine culture growing E faecalis and GNR, final results pending.  -blood-tinged urine noted nephrostomy collection bag 12/27.  Asymptomatic, Hgb stable.  Discussed with IR, recommend continued monitoring.  If develops rob bleeding or decreased urine output, KUB for positioning and further discussion with IR pending results.    Malfunction of nephrostomy tube  S/p right nephrostomy tube exchanged by IR earlier at admission   -flush nephrostomy tube qshift with sterile saline  -routine drain care and monitoring urine output.     Presence of urostomy  Routine wound care      Anemia  -- Monitor serial CBC  -- Transfuse PRBC if patient becomes hemodynamically unstable, symptomatic or H/H drops below 7/21.  -- B12 low normal, supplement.  Folate normal    Essential hypertension  HOLD home amlodipine as BP is low normal  Resume when clinically appropriate    CKD (chronic kidney disease), stage III  Cr baseline approx 1-1.5; stable  Avoid nephrotoxins, renally dose meds  Monitor a.m. labs    Vitamin D deficiency  Noted in labs,  -- start vit d      Recurrent major depressive disorder, in partial remission  Patient has persistent depression which is severe and is currently controlled. Will Continue anti-depressant medications. We will not consult psychiatry at this time. Patient does not display psychosis at this time. Continue to monitor closely and adjust plan of care as needed.          VTE Risk Mitigation (From admission, onward)           Ordered     enoxaparin injection 40 mg  Every 24 hours         12/24/24 2155     IP VTE HIGH RISK PATIENT  Once         12/24/24 2155     Place sequential compression device  Until discontinued         12/24/24 2155                    Discharge Planning   OK: 12/27/2024      Code Status: Full Code   Medical Readiness for Discharge Date:   Discharge Plan A: Home with family          Earl Fisher III, MD  Department of Hospital Medicine   [facility]

## 2024-12-27 NOTE — PROGRESS NOTES
[facility] Surg  Infectious Disease  Progress Note    Patient Name: Edita Riley  MRN: [id number]  Admission Date: 12/24/2024  Length of Stay: 0 days  Attending Physician: Earl Fisher III*  Primary Care Provider: Hernan Jack MD    Isolation Status: No active isolations  Assessment/Plan:      ID  * Pyelonephritis of right kidney  Edita Riley is a 58 year old woman with cervical cancer s/p XRT, bilateral ureteral strictures with obstructive uropathy now s/p transverse colon conduit, right nephrostomy tube, and ileostomy who was admitted 12/24 for nephrostomy tube malfunction. Began having acute on chronic worsening of her right flank pain. She reports fevers at home. Had right percutaneous nephrostomy tube exchanged day of admission. CT A/P with findings suspicious for bilateral urinary tract infection. Urine culture with multiple organisms, asking micro to work up.     Recommendations  - continue pip-tazo 4.5 q8  - will follow urine culture  - monitor for progression of diarrhea, if she has >3 watery stools per day would consider C. Diff testing  - will discuss hematuria with HM         Above discussed with primary team.     Time: 50 minutes   50% of time spent on face-to-face counseling and coordination of care. Counseling included review of test results, diagnosis, and treatment plan with patient and/or family.  I have reviewed hospital notes from  service and other specialty providers as well as outside medical records. I have also reviewed CBC, CMP/BMP,  cultures and imaging with my interpretation as documented. Patient is high risk of morbidity, on antibiotics requiring intensive monitoring for toxicity.       Anticipated Disposition: TBD    Thank you for your consult. I will follow-up with patient. Please contact us if you have any additional questions.    Charito Arroyo MD  Infectious Disease  [facility]    Subjective:     Principal Problem:Pyelonephritis  of right kidney    HPI: Edita Riley is a 58 year old woman with cervical cancer s/p XRT, bilateral ureteral strictures with obstructive uropathy now s/p transverse colon conduit, right nephrostomy tube, and ileostomy who was admitted 12/24 for nephrostomy tube malfunction. Began having acute on chronic worsening of her right flank pain. She reports fevers at home. Had right percutaneous nephrostomy tube exchanged day of admission. CT A/P with findings suspicious for bilateral urinary tract infection. Urine culture pending. Symptoms improved since admission, but still with pain.      Interval History: had 3-4 loose stools yesterday but none today so far. Urine in nephrostomy became bloody last night or this morning. No change in pain. Has good appetite.     Review of Systems   Constitutional:  Negative for appetite change, fatigue and fever.   Gastrointestinal:  Positive for abdominal pain.   Genitourinary:  Positive for flank pain and hematuria.     Objective:     Vital Signs (Most Recent):  Temp: 98 °F (36.7 °C) (12/27/24 1118)  Pulse: 70 (12/27/24 1118)  Resp: 18 (12/27/24 1118)  BP: 114/76 (12/27/24 1118)  SpO2: 96 % (12/27/24 1118) Vital Signs (24h Range):  Temp:  [97.5 °F (36.4 °C)-98.1 °F (36.7 °C)] 98 °F (36.7 °C)  Pulse:  [69-77] 70  Resp:  [17-18] 18  SpO2:  [96 %-97 %] 96 %  BP: (100-114)/(65-76) 114/76     Weight: 83.9 kg (184 lb 15.5 oz)  Body mass index is 27.31 kg/m².    Estimated Creatinine Clearance: 48.8 mL/min (based on SCr of 1.4 mg/dL).     Physical Exam  Vitals and nursing note reviewed.   Constitutional:       Appearance: Normal appearance. She is not ill-appearing.   HENT:      Head: Normocephalic.   Pulmonary:      Effort: Pulmonary effort is normal. No respiratory distress.   Abdominal:      Palpations: Abdomen is soft.      Tenderness: There is abdominal tenderness. There is right CVA tenderness.      Comments: Blood tinged urine in nephrostomy tube, ileostomy present   Skin:      General: Skin is warm and dry.   Neurological:      Mental Status: She is alert.   Psychiatric:         Mood and Affect: Mood normal.         Behavior: Behavior normal.          Significant Labs:   Microbiology Results (last 7 days)       Procedure Component Value Units Date/Time    Urine culture [[id number]]  (Abnormal) Collected: 12/24/24 1812    Order Status: Completed Specimen: Urine Updated: 12/27/24 0932     Urine Culture, Routine Multiple organisms isolated. None in predominance. Repeat if clinically      necessary.     Comment: Previous comment was modified by [email or web address] at 12:33 on 12/26/2024   Multiple organisms isolated. None in predominance. Repeat   ifclinically necessary.           ENTEROCOCCUS SPECIES  >100,000 cfu/ml  Identification and susceptibility pending        GRAM NEGATIVE BALAJI  10,000 - 49,999 cfu/ml  Identification and susceptibility pending      Narrative:      Specimen Source->Urine    Gram stain [[id number]] Collected: 12/24/24 1812    Order Status: Completed Specimen: Urine from Clean Catch Updated: 12/25/24 0006     Gram Stain Result Rare WBC's      Few Gram negative rods      Rare Gram positive cocci    Narrative:      add on test urine gram per nevaeh jacobo  12/24/2024  23:33     Gram stain [[id number]]     Order Status: Completed Specimen: Urine             Significant Imaging: I have reviewed all pertinent imaging results/findings within the past 24 hours.

## 2024-12-27 NOTE — SUBJECTIVE & OBJECTIVE
Interval History: had 3-4 loose stools yesterday but none today so far. Urine in nephrostomy became bloody last night or this morning. No change in pain. Has good appetite.     Review of Systems   Constitutional:  Negative for appetite change, fatigue and fever.   Gastrointestinal:  Positive for abdominal pain.   Genitourinary:  Positive for flank pain and hematuria.     Objective:     Vital Signs (Most Recent):  Temp: 98 °F (36.7 °C) (12/27/24 1118)  Pulse: 70 (12/27/24 1118)  Resp: 18 (12/27/24 1118)  BP: 114/76 (12/27/24 1118)  SpO2: 96 % (12/27/24 1118) Vital Signs (24h Range):  Temp:  [97.5 °F (36.4 °C)-98.1 °F (36.7 °C)] 98 °F (36.7 °C)  Pulse:  [69-77] 70  Resp:  [17-18] 18  SpO2:  [96 %-97 %] 96 %  BP: (100-114)/(65-76) 114/76     Weight: 83.9 kg (184 lb 15.5 oz)  Body mass index is 27.31 kg/m².    Estimated Creatinine Clearance: 48.8 mL/min (based on SCr of 1.4 mg/dL).     Physical Exam  Vitals and nursing note reviewed.   Constitutional:       Appearance: Normal appearance. She is not ill-appearing.   HENT:      Head: Normocephalic.   Pulmonary:      Effort: Pulmonary effort is normal. No respiratory distress.   Abdominal:      Palpations: Abdomen is soft.      Tenderness: There is abdominal tenderness. There is right CVA tenderness.      Comments: Blood tinged urine in nephrostomy tube, ileostomy present   Skin:     General: Skin is warm and dry.   Neurological:      Mental Status: She is alert.   Psychiatric:         Mood and Affect: Mood normal.         Behavior: Behavior normal.          Significant Labs:   Microbiology Results (last 7 days)       Procedure Component Value Units Date/Time    Urine culture [[id number]]  (Abnormal) Collected: 12/24/24 1812    Order Status: Completed Specimen: Urine Updated: 12/27/24 0932     Urine Culture, Routine Multiple organisms isolated. None in predominance. Repeat if clinically      necessary.     Comment: Previous comment was modified by [email or web address] at 12:33 on  12/26/2024   Multiple organisms isolated. None in predominance. Repeat   ifclinically necessary.           ENTEROCOCCUS SPECIES  >100,000 cfu/ml  Identification and susceptibility pending        GRAM NEGATIVE BALAJI  10,000 - 49,999 cfu/ml  Identification and susceptibility pending      Narrative:      Specimen Source->Urine    Gram stain [[id number]] Collected: 12/24/24 1812    Order Status: Completed Specimen: Urine from Clean Catch Updated: 12/25/24 0006     Gram Stain Result Rare WBC's      Few Gram negative rods      Rare Gram positive cocci    Narrative:      add on test urine gram per nevaeh jacobo  12/24/2024  23:33     Gram stain [[id number]]     Order Status: Completed Specimen: Urine             Significant Imaging: I have reviewed all pertinent imaging results/findings within the past 24 hours.

## 2024-12-27 NOTE — ASSESSMENT & PLAN NOTE
No sepsis criteria but pt feels like she has recurrent infection.  abnormal CT scan, UA with pyuria/hematuria, acute on chronic right flank pain   hx of MDR urine culture results - s/p Meropenem and vancomycin in ED.    -ID consulted: cont Zosyn.  Follow up urine culture (ID asked micro to workup multiple organisms on urine culture).

## 2024-12-27 NOTE — ASSESSMENT & PLAN NOTE
No sepsis criteria but pt feels like she has recurrent infection.  abnormal CT scan, UA with pyuria/hematuria, acute on chronic right flank pain   hx of MDR urine culture results - s/p Meropenem and vancomycin in ED.    -ID consulted: cont Zosyn.  Follow up urine culture growing E faecalis and GNR, final results pending.  -blood-tinged urine noted nephrostomy collection bag 12/27.  Asymptomatic, Hgb stable.  Discussed with IR, recommend continued monitoring.  If develops rob bleeding or decreased urine output, KUB for positioning and further discussion with IR pending results.

## 2024-12-27 NOTE — ASSESSMENT & PLAN NOTE
Edita Riley is a 58 year old woman with cervical cancer s/p XRT, bilateral ureteral strictures with obstructive uropathy now s/p transverse colon conduit, right nephrostomy tube, and ileostomy who was admitted 12/24 for nephrostomy tube malfunction. Began having acute on chronic worsening of her right flank pain. She reports fevers at home. Had right percutaneous nephrostomy tube exchanged day of admission. CT A/P with findings suspicious for bilateral urinary tract infection. Urine culture with multiple organisms, asking micro to work up.     Recommendations  - continue pip-tazo 4.5 q8  - will follow urine culture  - monitor for progression of diarrhea, if she has >3 watery stools per day would consider C. Diff testing  - will discuss hematuria with HM

## 2024-12-27 NOTE — SUBJECTIVE & OBJECTIVE
Subjective/Interval History:  Pt says right-sided flank pain has improved.  Noted dark blood-tinged urine and nephrostomy bag.  Denied any abdominal pain, nausea, vomiting, fever, chills    Labs personally reviewed:  WBC 5.61, Hgb 11.5, Na 142, Cr 1.4    Discussed with Interventional Radiology:  Recommend monitoring urine output and monitor for developing rob blood.  If urine output slows or pt develops rob bleeding, will get KUB to assess positioning and discuss further with IR    Objective:     Vital Signs (Most Recent):  Temp: 98 °F (36.7 °C) (12/27/24 1118)  Pulse: 70 (12/27/24 1118)  Resp: 18 (12/27/24 1118)  BP: 114/76 (12/27/24 1118)  SpO2: 96 % (12/27/24 1118) Vital Signs (24h Range):  Temp:  [97.5 °F (36.4 °C)-98.1 °F (36.7 °C)] 98 °F (36.7 °C)  Pulse:  [70-77] 70  Resp:  [17-18] 18  SpO2:  [96 %-97 %] 96 %  BP: (100-114)/(65-76) 114/76     Weight: 83.9 kg (184 lb 15.5 oz)  Body mass index is 27.31 kg/m².    Intake/Output Summary (Last 24 hours) at 12/27/2024 1556  Last data filed at 12/27/2024 0546  Gross per 24 hour   Intake 240 ml   Output 800 ml   Net -560 ml         Physical Exam  Constitutional:       General: She is not in acute distress.  Cardiovascular:      Rate and Rhythm: Normal rate and regular rhythm.      Heart sounds: Normal heart sounds. No murmur heard.  Pulmonary:      Effort: Pulmonary effort is normal. No respiratory distress.      Breath sounds: No wheezing.   Abdominal:      General: Bowel sounds are normal. There is no distension.      Tenderness: There is abdominal tenderness (Right flank).      Comments: Right flank nephrostomy tube in placed draining clear yellow urine.      LLQ - diverting urostomy with clear yellow urine output, no abdominal tenderness, surgical abdominal scars present     Musculoskeletal:      Right lower leg: No edema.      Left lower leg: No edema.   Neurological:      Mental Status: She is alert and oriented to person, place, and time.   Psychiatric:          Mood and Affect: Mood normal.             Significant Labs: All pertinent labs within the past 24 hours have been reviewed.    Significant Imaging: I have reviewed all pertinent imaging results/findings within the past 24 hours.

## 2024-12-28 VITALS
OXYGEN SATURATION: 98 % | TEMPERATURE: 98 F | HEIGHT: 69 IN | HEART RATE: 80 BPM | SYSTOLIC BLOOD PRESSURE: 124 MMHG | DIASTOLIC BLOOD PRESSURE: 76 MMHG | WEIGHT: 184.94 LBS | BODY MASS INDEX: 27.39 KG/M2 | RESPIRATION RATE: 18 BRPM

## 2024-12-28 LAB
ERYTHROCYTE [DISTWIDTH] IN BLOOD BY AUTOMATED COUNT: 15.2 % (ref 11.5–14.5)
HCT VFR BLD AUTO: 40.4 % (ref 37–48.5)
HGB BLD-MCNC: 12 G/DL (ref 12–16)
MCH RBC QN AUTO: 27.4 PG (ref 27–31)
MCHC RBC AUTO-ENTMCNC: 29.7 G/DL (ref 32–36)
MCV RBC AUTO: 92 FL (ref 82–98)
PLATELET # BLD AUTO: 216 K/UL (ref 150–450)
PMV BLD AUTO: 11 FL (ref 9.2–12.9)
RBC # BLD AUTO: 4.38 M/UL (ref 4–5.4)
WBC # BLD AUTO: 5.69 K/UL (ref 3.9–12.7)

## 2024-12-28 PROCEDURE — 94761 N-INVAS EAR/PLS OXIMETRY MLT: CPT

## 2024-12-28 PROCEDURE — 25000003 PHARM REV CODE 250: Performed by: FAMILY MEDICINE

## 2024-12-28 PROCEDURE — 25000003 PHARM REV CODE 250: Performed by: STUDENT IN AN ORGANIZED HEALTH CARE EDUCATION/TRAINING PROGRAM

## 2024-12-28 PROCEDURE — 36415 COLL VENOUS BLD VENIPUNCTURE: CPT | Performed by: FAMILY MEDICINE

## 2024-12-28 PROCEDURE — 85027 COMPLETE CBC AUTOMATED: CPT | Performed by: FAMILY MEDICINE

## 2024-12-28 PROCEDURE — 99233 SBSQ HOSP IP/OBS HIGH 50: CPT | Mod: ,,, | Performed by: STUDENT IN AN ORGANIZED HEALTH CARE EDUCATION/TRAINING PROGRAM

## 2024-12-28 PROCEDURE — 25000003 PHARM REV CODE 250: Performed by: HOSPITALIST

## 2024-12-28 PROCEDURE — 63600175 PHARM REV CODE 636 W HCPCS: Performed by: STUDENT IN AN ORGANIZED HEALTH CARE EDUCATION/TRAINING PROGRAM

## 2024-12-28 RX ORDER — AMOXICILLIN 875 MG/1
875 TABLET, FILM COATED ORAL EVERY 12 HOURS
Qty: 12 TABLET | Refills: 0 | Status: SHIPPED | OUTPATIENT
Start: 2024-12-28 | End: 2025-01-03

## 2024-12-28 RX ORDER — OXYCODONE AND ACETAMINOPHEN 5; 325 MG/1; MG/1
1 TABLET ORAL EVERY 8 HOURS PRN
Qty: 9 TABLET | Refills: 0 | Status: SHIPPED | OUTPATIENT
Start: 2024-12-28 | End: 2024-12-31

## 2024-12-28 RX ORDER — AMOXICILLIN 250 MG
1 CAPSULE ORAL DAILY PRN
COMMUNITY
Start: 2024-12-28 | End: 2025-01-11

## 2024-12-28 RX ORDER — LANOLIN ALCOHOL/MO/W.PET/CERES
1000 CREAM (GRAM) TOPICAL DAILY
Qty: 30 TABLET | Refills: 0 | Status: SHIPPED | OUTPATIENT
Start: 2024-12-29 | End: 2025-01-28

## 2024-12-28 RX ADMIN — CYANOCOBALAMIN TAB 250 MCG 1000 MCG: 250 TAB at 10:12

## 2024-12-28 RX ADMIN — PIPERACILLIN SODIUM AND TAZOBACTAM SODIUM 4.5 G: 4; .5 INJECTION, POWDER, FOR SOLUTION INTRAVENOUS at 01:12

## 2024-12-28 RX ADMIN — FERROUS SULFATE TAB EC 325 MG (65 MG FE EQUIVALENT) 1 EACH: 325 (65 FE) TABLET DELAYED RESPONSE at 10:12

## 2024-12-28 RX ADMIN — PIPERACILLIN SODIUM AND TAZOBACTAM SODIUM 4.5 G: 4; .5 INJECTION, POWDER, FOR SOLUTION INTRAVENOUS at 04:12

## 2024-12-28 RX ADMIN — Medication 1 TABLET: at 10:12

## 2024-12-28 NOTE — PROGRESS NOTES
[facility]  Infectious Disease  Progress Note    Patient Name: Edita Riley  MRN: [id number]  Admission Date: 12/24/2024  Length of Stay: 1 days  Attending Physician: Earl Fisher III*  Primary Care Provider: Hernan Jack MD    Isolation Status: No active isolations  Assessment/Plan:      ID  * Pyelonephritis of right kidney  Edita Riley is a 58 year old woman with cervical cancer s/p XRT, bilateral ureteral strictures with obstructive uropathy now s/p transverse colon conduit, right nephrostomy tube, and ileostomy who was admitted 12/24 for nephrostomy tube malfunction. Began having acute on chronic worsening of her right flank pain. She reports fevers at home. Had right percutaneous nephrostomy tube exchanged day of admission. CT A/P with findings suspicious for bilateral urinary tract infection. Urine culture with pan susceptible E. Faecalis and E. Coli.      Recommendations  - stop pip-tazo  - start amoxicillin, would continue for 10 days of antibiotics  - patient counseled on alarm symptoms of diarrhea         Above discussed with primary team.     Time: 50 minutes   50% of time spent on face-to-face counseling and coordination of care. Counseling included review of test results, diagnosis, and treatment plan with patient and/or family.  I have reviewed hospital notes from HM service and other specialty providers as well as outside medical records. I have also reviewed CBC, CMP/BMP,  cultures and imaging with my interpretation as documented. Patient is high risk of morbidity, on antibiotics requiring intensive monitoring for toxicity.     Anticipated Disposition: TBD    Thank you for your consult. I will sign off. Please contact us if you have any additional questions.    Charito Arroyo MD  Infectious Disease  [facility]    Subjective:     Principal Problem:Pyelonephritis of right kidney    HPI: Edita Riley is a 58 year old woman with cervical  cancer s/p XRT, bilateral ureteral strictures with obstructive uropathy now s/p transverse colon conduit, right nephrostomy tube, and ileostomy who was admitted 12/24 for nephrostomy tube malfunction. Began having acute on chronic worsening of her right flank pain. She reports fevers at home. Had right percutaneous nephrostomy tube exchanged day of admission. CT A/P with findings suspicious for bilateral urinary tract infection. Urine culture pending. Symptoms improved since admission, but still with pain.      Interval History: feeling okay. Had 3 loose stools yesterday and 1 so far today, feels like it's getting better. Urine no longer bloody appearing.     Review of Systems   Gastrointestinal:  Positive for diarrhea.   Genitourinary:  Positive for flank pain.     Objective:     Vital Signs (Most Recent):  Temp: 97.7 °F (36.5 °C) (12/28/24 1116)  Pulse: 60 (12/28/24 1116)  Resp: 18 (12/28/24 1116)  BP: 131/72 (12/28/24 1116)  SpO2: 96 % (12/28/24 1116) Vital Signs (24h Range):  Temp:  [96.2 °F (35.7 °C)-97.7 °F (36.5 °C)] 97.7 °F (36.5 °C)  Pulse:  [60-75] 60  Resp:  [18] 18  SpO2:  [95 %-98 %] 96 %  BP: (104-131)/(57-80) 131/72     Weight: 83.9 kg (184 lb 15.5 oz)  Body mass index is 27.31 kg/m².    Estimated Creatinine Clearance: 48.8 mL/min (based on SCr of 1.4 mg/dL).     Physical Exam  Vitals and nursing note reviewed.   Constitutional:       Appearance: Normal appearance. She is not ill-appearing.   HENT:      Mouth/Throat:      Mouth: Mucous membranes are moist.   Pulmonary:      Effort: Pulmonary effort is normal. No respiratory distress.   Abdominal:      Palpations: Abdomen is soft.      Tenderness: There is abdominal tenderness. There is right CVA tenderness.      Comments: Ileostomy, R flank nephrostomy tube   Skin:     Findings: No rash.   Neurological:      Mental Status: She is alert.   Psychiatric:         Mood and Affect: Mood normal.         Behavior: Behavior normal.          Significant Labs:    Microbiology Results (last 7 days)       Procedure Component Value Units Date/Time    Urine culture [[id number]]  (Abnormal)  (Susceptibility) Collected: 12/24/24 1812    Order Status: Completed Specimen: Urine Updated: 12/28/24 1314     Urine Culture, Routine Multiple organisms isolated. None in predominance. Repeat if clinically      necessary.     Comment: Previous comment was modified by [email or web address] at 12:33 on 12/26/2024   Multiple organisms isolated. None in predominance. Repeat   ifclinically necessary.           ENTEROCOCCUS FAECALIS  >100,000 cfu/ml        ESCHERICHIA COLI  10,000 - 49,999 cfu/ml      Narrative:      Specimen Source->Urine    Gram stain [[id number]] Collected: 12/24/24 1812    Order Status: Completed Specimen: Urine from Clean Catch Updated: 12/25/24 0006     Gram Stain Result Rare WBC's      Few Gram negative rods      Rare Gram positive cocci    Narrative:      add on test urine gram per nevaeh jacobo  12/24/2024  23:33     Gram stain [[id number]]     Order Status: Completed Specimen: Urine             Significant Imaging: I have reviewed all pertinent imaging results/findings within the past 24 hours.

## 2024-12-28 NOTE — ASSESSMENT & PLAN NOTE
Edita Riley is a 58 year old woman with cervical cancer s/p XRT, bilateral ureteral strictures with obstructive uropathy now s/p transverse colon conduit, right nephrostomy tube, and ileostomy who was admitted 12/24 for nephrostomy tube malfunction. Began having acute on chronic worsening of her right flank pain. She reports fevers at home. Had right percutaneous nephrostomy tube exchanged day of admission. CT A/P with findings suspicious for bilateral urinary tract infection. Urine culture with pan susceptible E. Faecalis and E. Coli.      Recommendations  - stop pip-tazo  - start amoxicillin, would continue for 10 days of antibiotics  - patient counseled on alarm symptoms of diarrhea

## 2024-12-28 NOTE — PLAN OF CARE
Case Management Final Discharge Note      Discharge Disposition: Home    New DME ordered / company name: None    Relevant SDOH / Transition of Care Barriers:  None    Primary Caretaker and contact information: Self    Scheduled followup appointment: PCP    Referrals placed: None    Transportation: Family    Patient educated on discharge services and updated on DC plan. Bedside RN notified, patient clear to discharge from Case Management Perspective.      12/28/24 1323   Final Note   Assessment Type Final Discharge Note   Anticipated Discharge Disposition Home   Hospital Resources/Appts/Education Provided Provided patient/caregiver with written discharge plan information;Appointments scheduled and added to AVS   Post-Acute Status   Discharge Delays None known at this time     [facility] - [facility]  Discharge Final Note    Primary Care Provider: Hernan Jakc MD    Expected Discharge Date: 12/28/2024    Final Discharge Note (most recent)       Final Note - 12/28/24 1323          Final Note    Assessment Type Final Discharge Note (P)      Anticipated Discharge Disposition Home or Self Care (P)      Hospital Resources/Appts/Education Provided Provided patient/caregiver with written discharge plan information;Appointments scheduled and added to AVS (P)         Post-Acute Status    Discharge Delays None known at this time (P)                      Important Message from Medicare

## 2024-12-28 NOTE — PLAN OF CARE
Problem: Adult Inpatient Plan of Care  Goal: Plan of Care Review  12/28/2024 0716 by Ameena Givens RN  Outcome: Progressing  12/28/2024 0716 by Ameena Givens RN  Outcome: Progressing  Goal: Patient-Specific Goal (Individualized)  12/28/2024 0716 by Ameena Givens RN  Outcome: Progressing  12/28/2024 0716 by Ameena Givens RN  Outcome: Progressing  Goal: Absence of Hospital-Acquired Illness or Injury  12/28/2024 0716 by Ameena Givens RN  Outcome: Progressing  12/28/2024 0716 by Ameena Givens RN  Outcome: Progressing  Goal: Optimal Comfort and Wellbeing  12/28/2024 0716 by Ameena Givens RN  Outcome: Progressing  12/28/2024 0716 by Ameena Givens RN  Outcome: Progressing  Goal: Readiness for Transition of Care  12/28/2024 0716 by Ameena Givens RN  Outcome: Progressing  12/28/2024 0716 by Ameena Givens RN  Outcome: Progressing     Problem: Acute Kidney Injury/Impairment  Goal: Fluid and Electrolyte Balance  Outcome: Progressing  Goal: Improved Oral Intake  Outcome: Progressing  Goal: Effective Renal Function  Outcome: Progressing

## 2024-12-28 NOTE — PLAN OF CARE
Problem: Adult Inpatient Plan of Care  Goal: Plan of Care Review  12/28/2024 0716 by Ameean Givens RN  Outcome: Progressing  12/28/2024 0716 by Ameena Givens RN  Outcome: Progressing  Goal: Patient-Specific Goal (Individualized)  12/28/2024 0716 by Ameena Givens RN  Outcome: Progressing  12/28/2024 0716 by Ameena Givens RN  Outcome: Progressing  Goal: Absence of Hospital-Acquired Illness or Injury  12/28/2024 0716 by Ameena Givens RN  Outcome: Progressing  12/28/2024 0716 by Ameena Givens RN  Outcome: Progressing  Goal: Optimal Comfort and Wellbeing  12/28/2024 0716 by Ameena Givens RN  Outcome: Progressing  12/28/2024 0716 by Ameena Givens RN  Outcome: Progressing  Goal: Readiness for Transition of Care  12/28/2024 0716 by Ameena Givens RN  Outcome: Progressing  12/28/2024 0716 by Ameena Givens RN  Outcome: Progressing     Problem: Acute Kidney Injury/Impairment  Goal: Fluid and Electrolyte Balance  Outcome: Progressing  Goal: Improved Oral Intake  Outcome: Progressing  Goal: Effective Renal Function  Outcome: Progressing

## 2024-12-28 NOTE — SUBJECTIVE & OBJECTIVE
Interval History: feeling okay. Had 3 loose stools yesterday and 1 so far today, feels like it's getting better. Urine no longer bloody appearing.     Review of Systems   Gastrointestinal:  Positive for diarrhea.   Genitourinary:  Positive for flank pain.     Objective:     Vital Signs (Most Recent):  Temp: 97.7 °F (36.5 °C) (12/28/24 1116)  Pulse: 60 (12/28/24 1116)  Resp: 18 (12/28/24 1116)  BP: 131/72 (12/28/24 1116)  SpO2: 96 % (12/28/24 1116) Vital Signs (24h Range):  Temp:  [96.2 °F (35.7 °C)-97.7 °F (36.5 °C)] 97.7 °F (36.5 °C)  Pulse:  [60-75] 60  Resp:  [18] 18  SpO2:  [95 %-98 %] 96 %  BP: (104-131)/(57-80) 131/72     Weight: 83.9 kg (184 lb 15.5 oz)  Body mass index is 27.31 kg/m².    Estimated Creatinine Clearance: 48.8 mL/min (based on SCr of 1.4 mg/dL).     Physical Exam  Vitals and nursing note reviewed.   Constitutional:       Appearance: Normal appearance. She is not ill-appearing.   HENT:      Mouth/Throat:      Mouth: Mucous membranes are moist.   Pulmonary:      Effort: Pulmonary effort is normal. No respiratory distress.   Abdominal:      Palpations: Abdomen is soft.      Tenderness: There is abdominal tenderness. There is right CVA tenderness.      Comments: Ileostomy, R flank nephrostomy tube   Skin:     Findings: No rash.   Neurological:      Mental Status: She is alert.   Psychiatric:         Mood and Affect: Mood normal.         Behavior: Behavior normal.          Significant Labs:   Microbiology Results (last 7 days)       Procedure Component Value Units Date/Time    Urine culture [[id number]]  (Abnormal)  (Susceptibility) Collected: 12/24/24 1812    Order Status: Completed Specimen: Urine Updated: 12/28/24 1314     Urine Culture, Routine Multiple organisms isolated. None in predominance. Repeat if clinically      necessary.     Comment: Previous comment was modified by [email or web address] at 12:33 on 12/26/2024   Multiple organisms isolated. None in predominance. Repeat   ifclinically necessary.            ENTEROCOCCUS FAECALIS  >100,000 cfu/ml        ESCHERICHIA COLI  10,000 - 49,999 cfu/ml      Narrative:      Specimen Source->Urine    Gram stain [[id number]] Collected: 12/24/24 1812    Order Status: Completed Specimen: Urine from Clean Catch Updated: 12/25/24 0006     Gram Stain Result Rare WBC's      Few Gram negative rods      Rare Gram positive cocci    Narrative:      add on test urine gram per nevaeh jacobo  12/24/2024  23:33     Gram stain [[id number]]     Order Status: Completed Specimen: Urine             Significant Imaging: I have reviewed all pertinent imaging results/findings within the past 24 hours.

## 2024-12-29 LAB
BACTERIA UR CULT: ABNORMAL

## 2024-12-29 NOTE — NURSING
Pt  has arrived to room. Pt medication was picked up from pharmacy by GABRIELA Strickland. Discharge by wheel chair to personal automobile

## 2024-12-30 NOTE — DISCHARGE SUMMARY
[facility] Medicine  Discharge Summary      Patient Name: Edita Riley  MRN: [id number]  LUIZ: [id number]  Patient Class: IP- Inpatient  Admission Date: 12/24/2024  Hospital Length of Stay: 4 days  Discharge Date and Time: 12/28/2024  8:25 PM  Attending Physician: Delma att. providers found   Discharging Provider: Earl Fisher III, MD  Primary Care Provider: Hernan Jack MD  [facility] Medicine Team: [facility] Earl Fisher III, MD  Primary Care Team: [facility]    HPI:   Per admitting physician:  59 y/o AAF hx of Cervical Cancer s/p XRT & Bilateral Ureteral strictures with obstructive uropathy now s/p transverse colon conduit, s/p right  Nephrostomy tube, s/p Ileostomy, hx of MDR UTI/pyelonephritis presents to the ED with complaints of nephrostomy tube malfunction.     She has had chronic back and right flank pain, reports some acute on chronic right flank/back pain.  In ED patient with new findings of red/brown tinted urine, concern of bleeding at site of nephrostomy tube insertion and also leaking in nephrostomy tubing.  She is found with right nephrostomy tube malfunction, in the ED, IR was consulted and taken patient in the afternoon for right nephrostomy tube exchange, successful exchange performed.  Her post-exchange UA with concerns of pyuria/hematuria and CT scan performed she is found with enhancement of Right kidney concerning for pyelonephritis.  She is referred for hospital medicine admission for management.     She denies any fevers/chills, has normal WBC count, her urine s/p tube exchange is clear and yellow in color.  She has hx of MDR UTI, Received Meropenem 1gram in ED and IV vancomycin + 500cc Normal saline bolus.       * No surgery found *      Hospital Course:   Admitted to hospital medicine for malfunction of nephrostomy tube and pyelonephritis of the right kidney.  Interventional Radiology exchanged nephrostomy tube.  Brief episode of  blood-tinged urine noted following exchange but this resolved.  ID consulted, pt started on Zosyn.  Urine culture positive for E faecalis, discussed with ID who recommended discontinuing Zosyn in favor of amoxicillin for a total of 10 days; prescription provided.  B12 low normal and supplementation provided.  Recommend outpatient follow up with PCP for repeat labs.    Pt deemed appropriate for discharge; seen and examined prior to departure.  The patient's chronic medical conditions were managed appropriately. Discharge plan of care was discussed at length with patient including patient's need for close outpatient follow-up. Medication counseling also took place with the patient being educated on potential side effects/adverse events. Patient also counseled about avoiding driving, operating machinery, or participating in any activities that may pose harm to self or others if they are taking medications that cause drowsiness or altered mental status. Patient also counseled to take medicines as prescribed and do not drink alcohol, use tobacco products, or use illicit substances. Patient counseled to return to the hospital or seek medical care if baseline status should suddenly change, return of symptoms occurs, or for any new or concerning symptoms that arise. Patient was in agreement with plan of care going forward and was then discharged.      Goals of Care Treatment Preferences:  Code Status: Full Code      SDOH Screening:  The patient was screened for utility difficulties, food insecurity, transport difficulties, housing insecurity, and interpersonal safety and there were no concerns identified this admission.     Consults:   Consults (From admission, onward)          Status Ordering Provider     Inpatient consult to Infectious Diseases  Once        Provider:  (Not yet assigned)    Completed FRANCHESCA AGGARWAL     Inpatient consult to Interventional Radiology  Once        Provider:  (Not yet assigned)    Completed  NEELIMA BLACKWELL            Final Active Diagnoses:    Diagnosis Date Noted POA    PRINCIPAL PROBLEM:  Pyelonephritis of right kidney [N12] 12/24/2024 Yes    Malfunction of nephrostomy tube [T83.098A] 12/24/2024 Yes    Presence of urostomy [Z93.6] 02/08/2021 Not Applicable    Anemia [D64.9] 12/25/2024 Yes    CKD (chronic kidney disease), stage III [N18.30] 02/14/2023 Yes    Vitamin D deficiency [E55.9] 01/04/2021 Yes    Recurrent major depressive disorder, in partial remission [F33.41] 09/16/2015 Yes    Essential hypertension [I10] 05/04/2015 Yes      Problems Resolved During this Admission:       Discharged Condition: stable    Disposition: Home or Self Care    Follow Up:    Patient Instructions:      Diet Adult Regular     Activity as tolerated       Significant Diagnostic Studies: N/A    Pending Diagnostic Studies:       None           Medications:  Reconciled Home Medications:      Medication List        START taking these medications      amoxicillin 875 MG tablet  Commonly known as: AMOXIL  Take 1 tablet (875 mg total) by mouth every 12 (twelve) hours for 6 days     cyanocobalamin 1000 MCG tablet  Commonly known as: VITAMIN B-12  Take 1 tablet (1,000 mcg total) by mouth once daily.     oxyCODONE-acetaminophen 5-325 mg per tablet  Commonly known as: PERCOCET  Take 1 tablet by mouth every 8 (eight) hours as needed for Pain.     senna-docusate 8.6-50 mg 8.6-50 mg per tablet  Commonly known as: PERICOLACE  Take 1 tablet by mouth daily as needed for Constipation.            CONTINUE taking these medications      acetaminophen 500 MG tablet  Commonly known as: TYLENOL  Take 1 tablet (500 mg total) by mouth every 6 (six) hours as needed for Pain.     amLODIPine 5 MG tablet  Commonly known as: NORVASC  Take 1 tablet (5 mg total) by mouth once daily.     ferrous sulfate 325 mg (65 mg iron) Tab tablet  Commonly known as: FEOSOL  TAKE 1 TABLET BY MOUTH TWICE A DAY     loratadine 10 mg tablet  Commonly known as:  CLARITIN  Take 10 mg by mouth daily as needed for Allergies.     mirtazapine 30 MG tablet  Commonly known as: REMERON  Take 1 tablet (30 mg total) by mouth nightly.     pantoprazole 40 MG tablet  Commonly known as: PROTONIX  Take 1 tablet (40 mg total) by mouth once daily. for 14 days              Indwelling Lines/Drains at time of discharge:   Lines/Drains/Airways       Drain  Duration                  Urostomy 02/06/24 1815 ureterostomy, left 327 days         Urostomy 02/25/24 1600  days         Nephrostomy 12/24/24 1713 Right 10 Fr. 5 days                    Time spent on the discharge of patient: 50 minutes         Earl Fisher III, MD  Department of Hospital Medicine  [facility] Surg

## 2025-01-03 ENCOUNTER — OFFICE VISIT (OUTPATIENT)
Dept: PRIMARY CARE CLINIC | Facility: CLINIC | Age: 60
End: 2025-01-03
Payer: MEDICAID

## 2025-01-03 VITALS
BODY MASS INDEX: 29.32 KG/M2 | HEART RATE: 83 BPM | DIASTOLIC BLOOD PRESSURE: 83 MMHG | TEMPERATURE: 98 F | OXYGEN SATURATION: 98 % | SYSTOLIC BLOOD PRESSURE: 131 MMHG | WEIGHT: 198.5 LBS

## 2025-01-03 DIAGNOSIS — N39.0 URINARY TRACT INFECTION WITHOUT HEMATURIA, SITE UNSPECIFIED: ICD-10-CM

## 2025-01-03 DIAGNOSIS — Z93.6 PRESENCE OF UROSTOMY: Primary | ICD-10-CM

## 2025-01-03 PROCEDURE — 1160F RVW MEDS BY RX/DR IN RCRD: CPT | Mod: CPTII,,,

## 2025-01-03 PROCEDURE — 3079F DIAST BP 80-89 MM HG: CPT | Mod: CPTII,,,

## 2025-01-03 PROCEDURE — 99213 OFFICE O/P EST LOW 20 MIN: CPT | Mod: S$PBB,,,

## 2025-01-03 PROCEDURE — 99214 OFFICE O/P EST MOD 30 MIN: CPT | Mod: PBBFAC,PN

## 2025-01-03 PROCEDURE — 1159F MED LIST DOCD IN RCRD: CPT | Mod: CPTII,,,

## 2025-01-03 PROCEDURE — 1111F DSCHRG MED/CURRENT MED MERGE: CPT | Mod: CPTII,,,

## 2025-01-03 PROCEDURE — 99999 PR PBB SHADOW E&M-EST. PATIENT-LVL IV: CPT | Mod: PBBFAC,,,

## 2025-01-03 PROCEDURE — 3075F SYST BP GE 130 - 139MM HG: CPT | Mod: CPTII,,,

## 2025-01-03 RX ORDER — OXYCODONE AND ACETAMINOPHEN 5; 325 MG/1; MG/1
1 TABLET ORAL EVERY 8 HOURS PRN
COMMUNITY

## 2025-01-09 ENCOUNTER — TELEPHONE (OUTPATIENT)
Dept: UROLOGY | Facility: CLINIC | Age: 60
End: 2025-01-09
Payer: MEDICAID

## 2025-01-09 DIAGNOSIS — N20.1 RIGHT URETERAL CALCULUS: Primary | ICD-10-CM

## 2025-01-10 NOTE — ANESTHESIA PAT ROS NOTE
01/10/2025  Edita Riley is a 59 y.o., female.      Pre-op Assessment          Review of Systems           Anesthesia Assessment: Preoperative EQUATION    Planned Procedure: Procedure(s) (LRB):  Nephrostogram - antegrade (N/A)  REMOVAL, CALCULUS, URETER, URETEROSCOPIC (N/A)  NEPHROLITHOTRIPSY, PERCUTANEOUS, USING LASER (Right)  Requested Anesthesia Type:General  Surgeon: Leslie Balbuena MD  Service: Urology  Known or anticipated Date of Surgery:2/25/2025    Surgeon notes: reviewed    Electronic QUestionnaire Assessment completed via nurse interview with patient.        Triage considerations:     The patient has no apparent active cardiac condition (No unstable coronary Syndrome such as severe unstable angina or recent [<1 month] myocardial infarction, decompensated CHF, severe valvular   disease or significant arrhythmia)    Previous anesthesia records:GETA, No problems, and Hx of difficult intubation  02/06/24 REVISION, ANASTOMOSIS, URETEROILEAL OPEN LAPAROTOMY, EXPLORATORY LYSIS, ADHESIONS (Abdomen) CLOSURE, ILEOSTOMY ANASTOMOSIS, INTESTINE - Ileocolic anastomosis (Abdomen) EXCISION, SMALL INTESTINE (Abdomen) LYSIS, ADHESIONS, general anesthesia, ASA 3  Intubation     Date/Time: 2/6/2024 7:21 AM     Performed by: Andrew Fisher MD  Authorized by: Sanford Garcia MD    Intubation:     Induction:  Intravenous    Intubated:  Postinduction    Mask Ventilation:  Easy with oral airway    Attempts:  1    Attempted By:  Resident anesthesiologist    Method of Intubation:  Video laryngoscopy    Blade:  Martinez 3    Laryngeal View Grade: Grade IIA - cords partially seen      Difficult Airway Encountered?: No      Complications:  None    Airway Device:  Oral endotracheal tube    Airway Device Size:  7.0    Style/Cuff Inflation:  Cuffed (inflated to minimal occlusive pressure)    Inflation Amount  (mL):  8    Tube secured:  21    Secured at:  The lips    Placement Verified By:  Capnometry    Complicating Factors:  None, anterior larynx, small mouth, narrow palate, large/floppy epiglottis, short neck and poor neck/head extension    Findings Post-Intubation:  BS equal bilateral and atraumatic/condition of teeth unchanged    Last PCP note: 3-6 months ago , within [facility]   Subspecialty notes: Urology    Other important co-morbidities: GERD, HTN, JENN, and right ureteral calculus, right hydronephrosis, h/o nephrostomy, h/o urostomy, h/o colon conduit for ureteral stenosis 2/2 radiation therapy c/b colon perforation requiring diverting colostomy (later reversed along with repair of scarring, h/o cervical cancer s/p TVH and XRT, h/o metastatic SCC to LN, h/o DVT LE, anemia, AR, ODESSA, CKD 3a, anxiety, depression, h/o psychiatric care, h/o suicidal ideation, neuropathy, h/o QT prolongation, migraine, h/o seizures and seizure like activity, h/o stroke, hemifacial spasm, hiatal hernia, Schatzki's ring, emphysema, OA back, folate deficiency, colon polyp, bilateral ureteral obstruction, chronic back pain, h/o difficult intubation, fibromyalgia, h/o nena, NO BLOOD-JW, h/o bilateral oophorectomy       Tests already available:  Available tests,  within 3 months , within [facility] .   12/28/24 CBC  12/27/24 FOLATE, MAG, RENAL FX PANEL, VIT B-12  12/26/24 CMP, EKG            Instructions     Optimization:  Anesthesia Preop Clinic Assessment Indicated    Medical Opinion Indicated       Sub-specialist consult indicated: TBD       Plan:    Testing:  None Needed   Pre-anesthesia  visit       Visit focus: concerns in complex and/or prolonged anesthesia, airway concerns, past history of problem with anesthesia, post-operative pain control for chronic pain patient, acute or chronic anxiety concerns, clearance Amanda 3/ASA 3, last anesthesia over a year     Consultation:IM Perioperative Hospitalist    Navigation: Tests Scheduled.               Consults scheduled.             Results will be tracked by Preop Clinic.

## 2025-01-31 NOTE — PROGRESS NOTES
Transitional Care Note    Family and/or Caretaker present at visit?  Yes.  Diagnostic tests reviewed/disposition: No diagnosic tests pending after this hospitalization.  Disease/illness education: yes  Home health/community services discussion/referrals: Patient does not have home health established from hospital visit.  They do not need home health.  If needed, we will set up home health for the patient.   Establishment or re-establishment of referral orders for community resources: No other necessary community resources.   Discussion with other health care providers: No discussion with other health care providers necessary.   Admission Date: 12/24/2024  Hospital Length of Stay: 4 days  Discharge Date and Time: 12/28/2024  8:25 PM       Subjective:       Patient ID: Edita Riley is a 60 y.o. female.    Chief Complaint: Hospital Follow Up and Follow-up    Follow-up      Edita Riley is a 60 y.o. year old female  who comes to clinic for hospital follow up    Hospital Course:   Admitted to hospital medicine for malfunction of nephrostomy tube and pyelonephritis of the right kidney.  Interventional Radiology exchanged nephrostomy tube.  Brief episode of blood-tinged urine noted following exchange but this resolved.  ID consulted, pt started on Zosyn.  Urine culture positive for E faecalis, discussed with ID who recommended discontinuing Zosyn in favor of amoxicillin for a total of 10 days; prescription provided.  B12 low normal and supplementation provided.  Recommend outpatient follow up with PCP for repeat labs.     Pt deemed appropriate for discharge; seen and examined prior to departure.  The patient's chronic medical conditions were managed appropriately. Discharge plan of care was discussed at length with patient including patient's need for close outpatient follow-up. Medication counseling also took place with the patient being educated on potential side effects/adverse events.      Interval history  Patient is currently feeling fine.  She states her symptoms resolved after she left the hospital.  No inflammatory changes surrounding nephrostomy tube.    ROS negative except as above  Objective:      /83 (BP Location: Right arm, Patient Position: Sitting)   Pulse 83   Temp 98.1 °F (36.7 °C) (Oral)   Wt 90.1 kg (198 lb 8.4 oz)   LMP 06/08/2014 (Approximate)   SpO2 98%   BMI 29.32 kg/m²    Physical Exam  Vitals reviewed.   Constitutional:       Appearance: Normal appearance.   HENT:      Head: Normocephalic.      Mouth/Throat:      Mouth: Mucous membranes are moist.   Cardiovascular:      Rate and Rhythm: Normal rate and regular rhythm.      Pulses: Normal pulses.      Heart sounds: Normal heart sounds.   Pulmonary:      Effort: Pulmonary effort is normal.      Breath sounds: Normal breath sounds. No wheezing or rhonchi.   Abdominal:      General: Abdomen is flat. There is no distension.   Musculoskeletal:         General: No swelling. Normal range of motion.      Cervical back: Normal range of motion.   Skin:     General: Skin is warm.      Coloration: Skin is not jaundiced.   Neurological:      Mental Status: She is alert.         Assessment:       1. Presence of urostomy    2. Urinary tract infection without hematuria, site unspecified        Plan:       1. Presence of urostomy (Primary)  No inflammatory changes, patient is doing fine.  No concerns of infection at this point.    2. Urinary tract infection without hematuria, site unspecified  Symptoms resolved after she leaves the hospital.          Follow up in about 6 months (around 7/3/2025).      Hernan Jack M.D.

## 2025-02-04 ENCOUNTER — TELEPHONE (OUTPATIENT)
Dept: INTERVENTIONAL RADIOLOGY/VASCULAR | Facility: CLINIC | Age: 60
End: 2025-02-04
Payer: MEDICAID

## 2025-02-05 ENCOUNTER — PATIENT OUTREACH (OUTPATIENT)
Dept: ADMINISTRATIVE | Facility: HOSPITAL | Age: 60
End: 2025-02-05
Payer: MEDICAID

## 2025-02-05 ENCOUNTER — TELEPHONE (OUTPATIENT)
Dept: PREADMISSION TESTING | Facility: HOSPITAL | Age: 60
End: 2025-02-05
Payer: MEDICAID

## 2025-02-05 NOTE — LETTER
February 5, 2025    Edita Snowden[facility]delicia  [id number]  [place] [address]             Dear Edita    In addition to helping you feel better when you are sick, we are interested in preventing illness and injury in the first place.  Screening tests and routine follow up tests when you have certain medical problems are very essential in helping you stay as healthy as possible. In the spirit of maintaining your health, our system indicates that you are due for the following:    Health Maintenance Due   Topic Date Due    Pneumococcal Vaccines (Age 50+) (1 of 2 - PCV) Never done    Mammogram  08/25/2021    RSV Vaccine (Age 60+ and Pregnant patients) (1 - Risk 60-74 years 1-dose series) Never done    Hemoglobin A1c (Diabetic Prevention Screening)  02/04/2024        Please be prepared to discuss these recommended tests at your next visit.  If you haven't had a visit within the past one year please call [phone] to schedule or request an appointment.    Sincerely,       Your Health Care Team

## 2025-02-05 NOTE — PROGRESS NOTES
Health Maintenance Due   Topic Date Due    Pneumococcal Vaccines (Age 50+) (1 of 2 - PCV) Never done    Mammogram  08/25/2021    RSV Vaccine (Age 60+ and Pregnant patients) (1 - Risk 60-74 years 1-dose series) Never done    Hemoglobin A1c (Diabetic Prevention Screening)  02/04/2024    LVM to schedule mammogram, mailed reminder.

## 2025-02-18 ENCOUNTER — HOSPITAL ENCOUNTER (OUTPATIENT)
Dept: PREADMISSION TESTING | Facility: HOSPITAL | Age: 60
Discharge: HOME | End: 2025-02-18
Attending: UROLOGY
Payer: MEDICAID

## 2025-02-18 ENCOUNTER — TELEPHONE (OUTPATIENT)
Dept: INTERNAL MEDICINE | Facility: CLINIC | Age: 60
End: 2025-02-18
Payer: MEDICAID

## 2025-02-18 ENCOUNTER — OFFICE VISIT (OUTPATIENT)
Dept: INTERNAL MEDICINE | Facility: CLINIC | Age: 60
End: 2025-02-18
Payer: MEDICAID

## 2025-02-18 VITALS
BODY MASS INDEX: 29.47 KG/M2 | OXYGEN SATURATION: 98 % | RESPIRATION RATE: 16 BRPM | TEMPERATURE: 98 F | HEART RATE: 77 BPM | SYSTOLIC BLOOD PRESSURE: 131 MMHG | WEIGHT: 199 LBS | DIASTOLIC BLOOD PRESSURE: 61 MMHG | HEIGHT: 69 IN

## 2025-02-18 DIAGNOSIS — T88.4XXD HARD TO INTUBATE, SUBSEQUENT ENCOUNTER: ICD-10-CM

## 2025-02-18 DIAGNOSIS — I10 ESSENTIAL HYPERTENSION: ICD-10-CM

## 2025-02-18 DIAGNOSIS — K21.00 GASTROESOPHAGEAL REFLUX DISEASE WITH ESOPHAGITIS, UNSPECIFIED WHETHER HEMORRHAGE: ICD-10-CM

## 2025-02-18 DIAGNOSIS — Z86.718 HISTORY OF DVT (DEEP VEIN THROMBOSIS): Chronic | ICD-10-CM

## 2025-02-18 DIAGNOSIS — G56.01 CARPAL TUNNEL SYNDROME ON RIGHT: Primary | ICD-10-CM

## 2025-02-18 DIAGNOSIS — N18.30 STAGE 3 CHRONIC KIDNEY DISEASE, UNSPECIFIED WHETHER STAGE 3A OR 3B CKD: ICD-10-CM

## 2025-02-18 DIAGNOSIS — J30.1 ALLERGIC RHINITIS DUE TO POLLEN, UNSPECIFIED SEASONALITY: ICD-10-CM

## 2025-02-18 DIAGNOSIS — M79.7 FIBROMYALGIA: Chronic | ICD-10-CM

## 2025-02-18 DIAGNOSIS — M47.9 OSTEOARTHRITIS OF BACK: Chronic | ICD-10-CM

## 2025-02-18 DIAGNOSIS — C53.9 MALIGNANT NEOPLASM OF CERVIX, UNSPECIFIED SITE: ICD-10-CM

## 2025-02-18 DIAGNOSIS — K64.8 INTERNAL HEMORRHOIDS: ICD-10-CM

## 2025-02-18 DIAGNOSIS — K76.0 FATTY LIVER: ICD-10-CM

## 2025-02-18 DIAGNOSIS — Z78.9 DIFFICULT INTRAVENOUS ACCESS: ICD-10-CM

## 2025-02-18 DIAGNOSIS — G43.009 MIGRAINE WITHOUT AURA AND WITHOUT STATUS MIGRAINOSUS, NOT INTRACTABLE: ICD-10-CM

## 2025-02-18 DIAGNOSIS — Z86.73 HISTORY OF STROKE: ICD-10-CM

## 2025-02-18 DIAGNOSIS — N30.40 RADIATION CYSTITIS: Chronic | ICD-10-CM

## 2025-02-18 PROBLEM — E44.0 MODERATE MALNUTRITION: Chronic | Status: RESOLVED | Noted: 2020-09-20 | Resolved: 2025-02-18

## 2025-02-18 PROBLEM — D63.1 ANEMIA IN CHRONIC KIDNEY DISEASE: Status: RESOLVED | Noted: 2020-05-18 | Resolved: 2025-02-18

## 2025-02-18 PROBLEM — N18.9 ANEMIA IN CHRONIC KIDNEY DISEASE: Status: RESOLVED | Noted: 2020-05-18 | Resolved: 2025-02-18

## 2025-02-18 PROBLEM — D64.9 ANEMIA: Status: RESOLVED | Noted: 2024-12-25 | Resolved: 2025-02-18

## 2025-02-18 PROBLEM — R79.89 AZOTEMIA: Status: RESOLVED | Noted: 2023-12-07 | Resolved: 2025-02-18

## 2025-02-18 PROCEDURE — 99213 OFFICE O/P EST LOW 20 MIN: CPT | Mod: PBBFAC | Performed by: HOSPITALIST

## 2025-02-18 NOTE — OUTPATIENT SUBJECTIVE & OBJECTIVE
"Outpatient Subjective & Objective     Chief complaint-Preoperative evaluation, Perioperative Medical management, complication reduction plan     Active cardiac conditions- none    Revised cardiac risk index predictors- none    Functional capacity -Examples of physical activity  can take 1 flight of stairs----- She can undertake all the above activities without  chest pain,chest tightness, Shortness of breath ,dizziness,lightheadedness making her exercise tolerance more,  than 4 Mets.       Review of Systems   Constitutional:  Negative for chills and fever.        No unusual weight changes       HENT:          STOPBANG score  / 8    Loud Snoring   Tiredness/ Napping during the day   Witnessed stopping of breathing   Elevated BP  BMI> 35   Age over 50   Neck size over 40 CM     Eyes:         No new visual changes   Respiratory:          No cough , phlegm    No Hemoptysis   Cardiovascular:         As noted   Gastrointestinal:         No overt GI/ blood losses  Bowel movements- Regular   Endocrine:        Prednisone use > 20 mg daily for 3 weeks- none   Genitourinary:  Negative for dysuria.        No urinary hesitancy    Musculoskeletal:         As above   No unusual, muscle, joint pains   Skin:  Negative for rash.   Neurological:  Negative for syncope.        No unilateral weakness   Hematological:         Current use of Anticoagulants  None   Psychiatric/Behavioral:                          No anesthesia, bleeding, cardiac problems, PONV with previous surgeries/procedures.  Medications and Allergies reviewed in epic.     FH- No anesthesia,bleeding / venous thrombosis ,   in family      Physical Exam  Blood pressure 131/61, pulse 77, temperature 97.7 °F (36.5 °C), temperature source Oral, resp. rate 16, height 5' 9" (1.753 m), weight 90.3 kg (199 lb), last menstrual period 06/08/2014, SpO2 98%.  I offered a sheet  and the presence of a chaperone during physical examination   She was comfortable to proceed with the " exam without the the presence of a chaperone      Physical Exam  Constitutional- Vitals - Body mass index is 29.39 kg/m².,   Vitals:    02/18/25 0920   BP: 131/61   Pulse: 77   Resp: 16   Temp: 97.7 °F (36.5 °C)     General appearance-Conscious,Coherent  Eyes- No conjunctival icterus,pupils  round  and reactive to light   ENT-Oral cavity- moist    , Hearing grossly normal   Neck- No thyromegaly ,Trachea -central, No jugular venous distension,   No Carotid Bruit   Cardiovascular -Heart Sounds- Normal  and  no murmur   , No gallop rhythm   Respiratory - Normal Respiratory Effort, Normal breath sounds,  no wheeze , and  no forced expiratory wheeze    Peripheral pitting pedal edema-- none , no calf pain   Gastrointestinal -Soft abdomen, No palpable masses, Non Tender,Liver,Spleen not palpable. No-- free fluid and shifting dullness  Musculoskeletal- No finger Clubbing. Strength grossly normal   Lymphatic-No Palpable cervical, axillary,Inguinal lymphadenopathy   Psychiatric - normal effect,Orientation  Rt Dorsalis pedis pulses-palpable    Lt Dorsalis pedis pulses- palpable   Rt Posterior tibial pulses -palpable   Left posterior tibial pulses -palpable   Miscellaneous -  urostomy bag and nephrostomy tube,  no asterixis,  no renal bruit,  Tattoo, and  tattoos     Discussed nutrition  Investigations  Lab and Imaging have been reviewed in epic.    Review of Medicine tests    EKG- I had independently reviewed the EKG from--12/24/2024  It was reported to be showing   Normal sinus rhythm   Normal ECG   When compared with ECG of 25-Feb-2024 16:04,   No significant change was found     2022    1. Scintigraphically negative for ischemia or infarct.  2. The global left ventricular systolic function is normal with an LV ejection fraction of greater than 75 % and no evidence of LV dilatation. Wall motion is normal.    2022    The left ventricle is normal in size with mild concentric hypertrophy and normal systolic function.  The  estimated ejection fraction is 70%.  Normal left ventricular diastolic function.  Normal right ventricular size with normal right ventricular systolic function.  Mild mitral regurgitation.  Normal central venous pressure (3 mmHg).  The estimated PA systolic pressure is 16 mmHg.      Review of clinical lab tests:  Lab Results   Component Value Date    CREATININE 1.4 12/27/2024    HGB 12.0 12/28/2024     12/28/2024             Review of old records- Was done and information gathered regards to events leading to surgery and health conditions of significance in the perioperative period.    Outpatient Subjective & Objective

## 2025-02-18 NOTE — ASSESSMENT & PLAN NOTE
She had surgery, radiation and chemotherapy    History of cervical cancer, status post total vaginal hysterectomy and radiation treatment    She is doing good from that standpoint

## 2025-02-18 NOTE — ASSESSMENT & PLAN NOTE
She had stroke when she was age 7 / 10 y  Asper the history that I have obtained from her   She had right-sided facial weakness  ' she has no longer having any right-sided weakness  She has no longer have any right-sided facial drooping  Given the young age she was felt to have stroke, I wonder if she had Bell's palsy     Unsure if it is hemorrhagic or ischemic   No problem since    Not known to have diabetes, blood pressure at that time   No migraine at that time  2018  Unremarkable MRA of the intracranial circulation.

## 2025-02-18 NOTE — ASSESSMENT & PLAN NOTE
Weight related conditions     Known to have     HTN  Acid reflux       Not troubled with / Not known to have       Type 2 Diabetes   Prediabetes   Gout   Hyperlipidemia   Sleep apnea   Fatty liver       Encouraged maintaining healthy weight for improved health

## 2025-02-18 NOTE — ASSESSMENT & PLAN NOTE
CT Dec 2024   Liver: The liver is mildly enlarged.  Mild diffuse fatty infiltration.  No focal abnormality.     - Gallbladder: No calcified gallstones.     - Bile Ducts: No evidence of intra or extra hepatic biliary ductal dilation.     - Spleen: Negative.    No alcohol excess   This likely is not alcohol fatty liver disease      No history  of cirrhosis of liver or suggestions of Liver  decompensation   No Jaundice , dark urine , pale stool   No easy bleeding or bruising    Weight loss and follow up discussed

## 2025-02-18 NOTE — H&P (VIEW-ONLY)
[facility]  Progress Note    Patient Name: Edita Riley  MRN: [id number]  Date of Evaluation- 02/18/2025  PCP- Hernan Jack MD    Future cases for Edita Riley [[id number]]       Case ID Status Date Time Yeison Procedure Provider Location    [id number] [facility] 2/25/2025  3:00  Nephrostogram - antegrade Leslie Balbuena MD [[id number]] [facility] OR 2ND FLR            HPI:  History of present illness- I had the pleasure of meeting this pleasant 60 y.o. lady in the pre op clinic prior to her elective Urological surgery. The patient is new to me .   Offered to have family/ friends on the phone during the evaluation process  .    He visit chart review this AM-  [phone]    I have obtained the history by speaking to the patient and by reviewing the electronic health records.    Events leading up to surgery / History of presenting illness -    Right ureteral calculus      Cervical Cancer s/p XRT & Bilateral Ureteral strictures with obstructive uropathy  s/p right  Nephrostomy tube    As per chart -Right ureteral calculus in a patient with history of colon conduit for ureteral stenosis from radiation therapy.  Status post colon conduit 9/11/2020.     Had  colon perforation- had a diverting colostomy    She had a left ureteral conduit scarring and this was repaired on 2/6/2024 and the colostomy was reversed at the same time.    She is currently having a nephrostomy on the right side and has been having pain in the right loin area for a few weeks time affecting her ability to sleep  She just finished antibiotic for urinary tract infection and she currently feels that she does not have any urinary tract infection    She is passing urine through the urethra and also having urine coming through the nephrostomy tube    She is hoping to have her kidney stone addressed due to the pain    Relevant health conditions of significance for the perioperative period/ History of presenting illness  -    [employer]  Difficulty intubation-as per her, she has not had any problem with this with the surgery she has had  History of cervical cancer, status post total vaginal hysterectomy and radiation treatment  History of DVT-lower extremity-none lately  History of stroke age 10 Y  Renal impairment  Migraine  Difficult IV access  Allergies-sneezing    Lives with her  in a ground level apartment complex  Currently not working  Pets-2 pet dogs  Children -2 twin daughters - grown-age 24 years  Pregnancies - 1  Miscarriages - None   C sections - 1  Has help post op    Not known to have HTN, heart problem , Prediabetes , Diabetes Mellitus, Lung problem, Thyroid problem,  pulmonary embolism, acid reflux, sleep apnea, COVID infection, fatty liver , blood vessels stent , tobacco smoking,  mental health problems , gout, constipation          Subjective/ Objective:     Chief complaint-Preoperative evaluation, Perioperative Medical management, complication reduction plan     Active cardiac conditions- none    Revised cardiac risk index predictors- none    Functional capacity -Examples of physical activity  can take 1 flight of stairs----- She can undertake all the above activities without  chest pain,chest tightness, Shortness of breath ,dizziness,lightheadedness making her exercise tolerance more,  than 4 Mets.       Review of Systems   Constitutional:  Negative for chills and fever.        No unusual weight changes       HENT:          STOPBANG score  / 8    Loud Snoring   Tiredness/ Napping during the day   Witnessed stopping of breathing   Elevated BP  BMI> 35   Age over 50   Neck size over 40 CM     Eyes:         No new visual changes   Respiratory:          No cough , phlegm    No Hemoptysis   Cardiovascular:         As noted   Gastrointestinal:         No overt GI/ blood losses  Bowel movements- Regular   Endocrine:        Prednisone use > 20 mg daily for 3 weeks- none   Genitourinary:  Negative for  "dysuria.        No urinary hesitancy    Musculoskeletal:         As above   No unusual, muscle, joint pains   Skin:  Negative for rash.   Neurological:  Negative for syncope.        No unilateral weakness   Hematological:         Current use of Anticoagulants  None   Psychiatric/Behavioral:                          No anesthesia, bleeding, cardiac problems, PONV with previous surgeries/procedures.  Medications and Allergies reviewed in epic.     FH- No anesthesia,bleeding / venous thrombosis ,   in family      Physical Exam  Blood pressure 131/61, pulse 77, temperature 97.7 °F (36.5 °C), temperature source Oral, resp. rate 16, height 5' 9" (1.753 m), weight 90.3 kg (199 lb), last menstrual period 06/08/2014, SpO2 98%.  I offered a sheet  and the presence of a chaperone during physical examination   She was comfortable to proceed with the exam without the the presence of a chaperone      Physical Exam  Constitutional- Vitals - Body mass index is 29.39 kg/m².,   Vitals:    02/18/25 0920   BP: 131/61   Pulse: 77   Resp: 16   Temp: 97.7 °F (36.5 °C)     General appearance-Conscious,Coherent  Eyes- No conjunctival icterus,pupils  round  and reactive to light   ENT-Oral cavity- moist    , Hearing grossly normal   Neck- No thyromegaly ,Trachea -central, No jugular venous distension,   No Carotid Bruit   Cardiovascular -Heart Sounds- Normal  and  no murmur   , No gallop rhythm   Respiratory - Normal Respiratory Effort, Normal breath sounds,  no wheeze , and  no forced expiratory wheeze    Peripheral pitting pedal edema-- none , no calf pain   Gastrointestinal -Soft abdomen, No palpable masses, Non Tender,Liver,Spleen not palpable. No-- free fluid and shifting dullness  Musculoskeletal- No finger Clubbing. Strength grossly normal   Lymphatic-No Palpable cervical, axillary,Inguinal lymphadenopathy   Psychiatric - normal effect,Orientation  Rt Dorsalis pedis pulses-palpable    Lt Dorsalis pedis pulses- palpable   Rt Posterior " tibial pulses -palpable   Left posterior tibial pulses -palpable   Miscellaneous -  urostomy bag and nephrostomy tube,  no asterixis,  no renal bruit,  Tattoo, and  tattoos     Discussed nutrition  Investigations  Lab and Imaging have been reviewed in [facility].    Review of Medicine tests    EKG- I had independently reviewed the EKG from--12/24/2024  It was reported to be showing   Normal sinus rhythm   Normal ECG   When compared with ECG of 25-Feb-2024 16:04,   No significant change was found     2022    1. Scintigraphically negative for ischemia or infarct.  2. The global left ventricular systolic function is normal with an LV ejection fraction of greater than 75 % and no evidence of LV dilatation. Wall motion is normal.    2022    The left ventricle is normal in size with mild concentric hypertrophy and normal systolic function.  The estimated ejection fraction is 70%.  Normal left ventricular diastolic function.  Normal right ventricular size with normal right ventricular systolic function.  Mild mitral regurgitation.  Normal central venous pressure (3 mmHg).  The estimated PA systolic pressure is 16 mmHg.      Review of clinical lab tests:  Lab Results   Component Value Date    CREATININE 1.4 12/27/2024    HGB 12.0 12/28/2024     12/28/2024             Review of old records- Was done and information gathered regards to events leading to surgery and health conditions of significance in the perioperative period.        Preoperative cardiac risk assessment-  The patient does not have any active cardiac conditions . Revised cardiac risk index predictors- 0---.Functional capacity is more than 4 Mets. She will be undergoing a Urological procedure that carries a Moderate Risk risk     Risk of a major Cardiac event ( Defined as death, myocardial infarction, or cardiac arrest at 30 days after noncardiac surgery), based on RCRI score     -3.9%         No further cardiac work up is indicated prior to proceeding with  the surgery          American Society of Anesthesiologists Physical status classification ( ASA ) class: 3     Postoperative pulmonary complication risk assessment:      ARISCAT ( Canet) risk index- risk class -  Low, if duration of surgery is under 3 hours, intermediate, if duration of surgery is over 3 hours          Assessment/Plan:     Carpal tunnel syndrome on right  Doing well    History of stroke  She had stroke when she was age 7 / 10 y  Asper the history that I have obtained from her   She had right-sided facial weakness  ' she has no longer having any right-sided weakness  She has no longer have any right-sided facial drooping  Given the young age she was felt to have stroke, I wonder if she had Bell's palsy     Unsure if it is hemorrhagic or ischemic   No problem since    Not known to have diabetes, blood pressure at that time   No migraine at that time  2018  Unremarkable MRA of the intracranial circulation.     Migraine without aura and without status migrainosus, not intractable  Doing good from a migraine standpoint  She takes Tylenol as needed  No tingling numbness of the hands and feet    Doing good from a mental health stand point   Not under psychiatry , Therapist care   Has supportive family   No Suicidal / Homicidal ideation      She is taking Percocet for kidney stone pain and I suggested care with the Tylenol from Percocet and Tylenol by itself and she is aware not to mix them    Osteoarthritis of back  She has low back pain  She has no leg weakness       Allergic rhinitis due to pollen  Doing good from an allergy standpoint and does not have any asthma or eczema     She is not troubled with breathing, wheezing  She walks in the house and also take stairs  She can go up a flight of stairs    She has no exertional chest pain, chest tightness, short of breath, dizziness or lightheadedness    Hard to intubate  Difficulty intubation-as per her, she has not had any problem with this with the  surgeries she has had in the past    Difficult intravenous access  I suggest that the perioperative team be aware of this     Essential hypertension  On amlodipine      Hypertension-  Blood pressure is acceptable . I suggest continuation of ---amlodipine----- during the entire perioperative period. I suggest blood pressure  monitoring .I suggest addressing pain control as uncontrolled pain can increased blood pressure      It is unclear, if she is taking blood pressure medication on    CKD (chronic kidney disease), stage III  Creatinine stable  Stages of CKD discussed  Deleterious effects NSAID's , Beneficial effects of Hydration discussed   Acetaminophen ( If not intolerant ) as needed for pain     I  suggest monitoring renal function, in put and out put status socorro-operatively. I  suggest avoiding nephrotoxic medication including NSAIDs, COX2 inhibitors, intravenous contrast agent,avoiding hypotension to prevent further renal impairment.   Care with intravenous contrast discussed     Radiation cystitis  No blood in the urine    History of DVT (deep vein thrombosis)  She had blood clot in both of her legs in her vein in the postoperative setting while she was in the rehab  Her blood clot at that time seems to have been a provoked blood clot from reduced mobility in the postoperative setting in the rehab setting     History of DVT  NoPE  1 Episode  Associated with reduced mobility, no long journeys, no cancer, prior surgery, Hospital stay, noHRT  No IVC filter       She had no recurrence of blood clot  Increased risk of thrombosis in the socorro operative period , compression stocking use discussed     2021    No evidence of deep venous thrombosis in either lower extremity.       She has not had any blood clot recently    Cervical cancer  She had surgery, radiation and chemotherapy    History of cervical cancer, status post total vaginal hysterectomy and radiation treatment    She is doing good from that  standpoint    Gastro-esophageal reflux disease with esophagitis  Protonix as needed for reflux  Doing good  No difficulty swallowing    Internal hemorrhoids  She is doing good from a hemorrhoid standpoint    Fibromyalgia  No longer having problems    Refusal of blood transfusions as patient is [place]   I suggest that the perioperative team be aware of this so that appropriate   care can be taken      BMI 29.0-29.9,adult  Weight related conditions     Known to have     HTN  Acid reflux       Not troubled with / Not known to have       Type 2 Diabetes   Prediabetes   Gout   Hyperlipidemia   Sleep apnea   Fatty liver       Encouraged maintaining healthy weight for improved health     Fatty liver  CT Dec 2024   Liver: The liver is mildly enlarged.  Mild diffuse fatty infiltration.  No focal abnormality.     - Gallbladder: No calcified gallstones.     - Bile Ducts: No evidence of intra or extra hepatic biliary ductal dilation.     - Spleen: Negative.    No alcohol excess   This likely is not alcohol fatty liver disease      No history  of cirrhosis of liver or suggestions of Liver  decompensation   No Jaundice , dark urine , pale stool   No easy bleeding or bruising    Weight loss and follow up discussed        Preventive perioperative care    Thromboembolic prophylaxis:  Her risk factors for thrombosis include surgical procedure, previous history of thrombosis, and age.I suggest  thromboembolic prophylaxis ( mechanical/pharmacological, weighing the risk benefits of pharmacological agent use considering socorro procedural bleeding )  during the perioperative period.I suggested being active in the post operative period.      Postoperative pulmonary complication prophylaxis-Risk factors for post operative pulmonary complications include ASA class >2- I suggest incentive spirometry use, early ambulation, end tidal carbon dioxide monitoring, and pain control so as to avoid diaphragmatic splinting  , oral care , head  end of bed elevation      Renal complication prophylaxis-Risk factors for renal complications include pre-existing renal disease . I suggest keeping her well hydrated and avoidance/ minimizing the use of  NSAID's,JACOBSON 2 Inhibitors ,IV contrast if possible in the perioperative period.      Surgical site Infection Prophylaxis-I  suggest appropriate antibiotic for Prophylaxis against Surgical site infections  No reported Staph infection  Skin antibacterial discussed         In view of urological procedure the patient  is at risk of postoperative urinary retention.  I suggest avoidance / minimizing the of  Benzodiazepines,Anticholinergic medication,antihistamines ( Benadryl) , if possible in the perioperative period. I suggest using the minimum possible use of opioids for the minimum period of time in the perioperative period. Benadryl avoidance suggested      This visit was focused on Preoperative evaluation, Perioperative Medical management, complication reduction plans. I suggest that the patient follows up with primary care or relevant sub specialists for ongoing health care.    I appreciate the opportunity to be involved in this patients care. Please feel free to contact me if there were any questions about this consultation.    Patient is optimized    Patient/ care giver/ Family member was instructed to call and update me about any changes to health,  medication, office visits ,testing out side of the [facility] operative care center , hospitalizations between now and surgery      Sofie Driver MD  Internal Medicine  [facility]   Cell Phone- [phone]    History of COVID -  no   COVID vaccination status -Yes    COVID screening     No fever   No cough   No SOB  No sore throat   No loss of taste or smell   No muscle aches   No nausea, vomiting , diarrhea     Abnormal coagulation profile in August of 2024-mildly abnormal APTT, normal PT   She was hospitalized around that time and may been  related to heparin use at that time    Not known to have coagulation factor deficiency, von Willebrand's disease or lupus anticoagulant    She usually does not have any bleeding problems with surgeries    Checked for over-the-counter medication      I have spent --70 ---- minutes of time which includes, time spent to prepare to see the patient , obtaining history ,performing examination, counseling/Educating the patient , Documenting clinical information in the record      --    2/21- 9 28     Called to follow up , spoke to her  to address any concerns with the up coming surgery or any questions on Medication instructions -  Doing well ,No changes to Medication, Health -

## 2025-02-18 NOTE — ASSESSMENT & PLAN NOTE
Doing good from an allergy standpoint and does not have any asthma or eczema     She is not troubled with breathing, wheezing  She walks in the house and also take stairs  She can go up a flight of stairs    She has no exertional chest pain, chest tightness, short of breath, dizziness or lightheadedness

## 2025-02-18 NOTE — ASSESSMENT & PLAN NOTE
Difficulty intubation-as per her, she has not had any problem with this with the surgeries she has had in the past

## 2025-02-18 NOTE — PROGRESS NOTES
[facility]  Progress Note    Patient Name: Edita Riley  MRN: [id number]  Date of Evaluation- 02/18/2025  PCP- Hernan Jack MD    Future cases for Edita Riley [[id number]]       Case ID Status Date Time Yeison Procedure Provider Location    [id number] [facility] 2/25/2025  3:00  Nephrostogram - antegrade Leslie Balbuena MD [[id number]] NOM OR 2ND FLR            HPI:  History of present illness- I had the pleasure of meeting this pleasant 60 y.o. lady in the pre op clinic prior to her elective Urological surgery. The patient is new to me .   Offered to have family/ friends on the phone during the evaluation process  .    He visit chart review this AM-  [phone]--[id number]    I have obtained the history by speaking to the patient and by reviewing the electronic health records.    Events leading up to surgery / History of presenting illness -    Right ureteral calculus      Cervical Cancer s/p XRT & Bilateral Ureteral strictures with obstructive uropathy  s/p right  Nephrostomy tube    As per chart -Right ureteral calculus in a patient with history of colon conduit for ureteral stenosis from radiation therapy.  Status post colon conduit 9/11/2020.     Had  colon perforation- had a diverting colostomy    She had a left ureteral conduit scarring and this was repaired on 2/6/2024 and the colostomy was reversed at the same time.    She is currently having a nephrostomy on the right side and has been having pain in the right loin area for a few weeks time affecting her ability to sleep  She just finished antibiotic for urinary tract infection and she currently feels that she does not have any urinary tract infection    She is passing urine through the urethra and also having urine coming through the nephrostomy tube    She is hoping to have her kidney stone addressed due to the pain    Relevant health conditions of significance for the perioperative period/ History of presenting illness  -    Jehovah Witness  Difficulty intubation-as per her, she has not had any problem with this with the surgery she has had  History of cervical cancer, status post total vaginal hysterectomy and radiation treatment  History of DVT-lower extremity-none lately  History of stroke age 10 Y  Renal impairment  Migraine  Difficult IV access  Allergies-sneezing    Lives with her  in a ground level apartment complex  Currently not working  Pets-2 pet dogs  Children -2 twin daughters - grown-age 24 years  Pregnancies - 1  Miscarriages - None   C sections - 1  Has help post op    Not known to have HTN, heart problem , Prediabetes , Diabetes Mellitus, Lung problem, Thyroid problem,  pulmonary embolism, acid reflux, sleep apnea, COVID infection, fatty liver , blood vessels stent , tobacco smoking,  mental health problems , gout, constipation          Subjective/ Objective:     Chief complaint-Preoperative evaluation, Perioperative Medical management, complication reduction plan     Active cardiac conditions- none    Revised cardiac risk index predictors- none    Functional capacity -Examples of physical activity  can take 1 flight of stairs----- She can undertake all the above activities without  chest pain,chest tightness, Shortness of breath ,dizziness,lightheadedness making her exercise tolerance more,  than 4 Mets.       Review of Systems   Constitutional:  Negative for chills and fever.        No unusual weight changes       HENT:          STOPBANG score  / 8    Loud Snoring   Tiredness/ Napping during the day   Witnessed stopping of breathing   Elevated BP  BMI> 35   Age over 50   Neck size over 40 CM     Eyes:         No new visual changes   Respiratory:          No cough , phlegm    No Hemoptysis   Cardiovascular:         As noted   Gastrointestinal:         No overt GI/ blood losses  Bowel movements- Regular   Endocrine:        Prednisone use > 20 mg daily for 3 weeks- none   Genitourinary:  Negative for  "dysuria.        No urinary hesitancy    Musculoskeletal:         As above   No unusual, muscle, joint pains   Skin:  Negative for rash.   Neurological:  Negative for syncope.        No unilateral weakness   Hematological:         Current use of Anticoagulants  None   Psychiatric/Behavioral:                          No anesthesia, bleeding, cardiac problems, PONV with previous surgeries/procedures.  Medications and Allergies reviewed in epic.     FH- No anesthesia,bleeding / venous thrombosis ,   in family      Physical Exam  Blood pressure 131/61, pulse 77, temperature 97.7 °F (36.5 °C), temperature source Oral, resp. rate 16, height 5' 9" (1.753 m), weight 90.3 kg (199 lb), last menstrual period 06/08/2014, SpO2 98%.  I offered a sheet  and the presence of a chaperone during physical examination   She was comfortable to proceed with the exam without the the presence of a chaperone      Physical Exam  Constitutional- Vitals - Body mass index is 29.39 kg/m².,   Vitals:    02/18/25 0920   BP: 131/61   Pulse: 77   Resp: 16   Temp: 97.7 °F (36.5 °C)     General appearance-Conscious,Coherent  Eyes- No conjunctival icterus,pupils  round  and reactive to light   ENT-Oral cavity- moist    , Hearing grossly normal   Neck- No thyromegaly ,Trachea -central, No jugular venous distension,   No Carotid Bruit   Cardiovascular -Heart Sounds- Normal  and  no murmur   , No gallop rhythm   Respiratory - Normal Respiratory Effort, Normal breath sounds,  no wheeze , and  no forced expiratory wheeze    Peripheral pitting pedal edema-- none , no calf pain   Gastrointestinal -Soft abdomen, No palpable masses, Non Tender,Liver,Spleen not palpable. No-- free fluid and shifting dullness  Musculoskeletal- No finger Clubbing. Strength grossly normal   Lymphatic-No Palpable cervical, axillary,Inguinal lymphadenopathy   Psychiatric - normal effect,Orientation  Rt Dorsalis pedis pulses-palpable    Lt Dorsalis pedis pulses- palpable   Rt Posterior " tibial pulses -palpable   Left posterior tibial pulses -palpable   Miscellaneous -  urostomy bag and nephrostomy tube,  no asterixis,  no renal bruit,  Tattoo, and  tattoos     Discussed nutrition  Investigations  Lab and Imaging have been reviewed in [facility].    Review of Medicine tests    EKG- I had independently reviewed the EKG from--12/24/2024  It was reported to be showing   Normal sinus rhythm   Normal ECG   When compared with ECG of 25-Feb-2024 16:04,   No significant change was found     2022    1. Scintigraphically negative for ischemia or infarct.  2. The global left ventricular systolic function is normal with an LV ejection fraction of greater than 75 % and no evidence of LV dilatation. Wall motion is normal.    2022    The left ventricle is normal in size with mild concentric hypertrophy and normal systolic function.  The estimated ejection fraction is 70%.  Normal left ventricular diastolic function.  Normal right ventricular size with normal right ventricular systolic function.  Mild mitral regurgitation.  Normal central venous pressure (3 mmHg).  The estimated PA systolic pressure is 16 mmHg.      Review of clinical lab tests:  Lab Results   Component Value Date    CREATININE 1.4 12/27/2024    HGB 12.0 12/28/2024     12/28/2024             Review of old records- Was done and information gathered regards to events leading to surgery and health conditions of significance in the perioperative period.        Preoperative cardiac risk assessment-  The patient does not have any active cardiac conditions . Revised cardiac risk index predictors- 0---.Functional capacity is more than 4 Mets. She will be undergoing a Urological procedure that carries a Moderate Risk risk     Risk of a major Cardiac event ( Defined as death, myocardial infarction, or cardiac arrest at 30 days after noncardiac surgery), based on RCRI score     -3.9%         No further cardiac work up is indicated prior to proceeding with  the surgery          American Society of Anesthesiologists Physical status classification ( ASA ) class: 3     Postoperative pulmonary complication risk assessment:      ARISCAT ( Canet) risk index- risk class -  Low, if duration of surgery is under 3 hours, intermediate, if duration of surgery is over 3 hours          Assessment/Plan:     Carpal tunnel syndrome on right  Doing well    History of stroke  She had stroke when she was age 7 / 10 y  Asper the history that I have obtained from her   She had right-sided facial weakness  ' she has no longer having any right-sided weakness  She has no longer have any right-sided facial drooping  Given the young age she was felt to have stroke, I wonder if she had Bell's palsy     Unsure if it is hemorrhagic or ischemic   No problem since    Not known to have diabetes, blood pressure at that time   No migraine at that time  2018  Unremarkable MRA of the intracranial circulation.     Migraine without aura and without status migrainosus, not intractable  Doing good from a migraine standpoint  She takes Tylenol as needed  No tingling numbness of the hands and feet    Doing good from a mental health stand point   Not under psychiatry , Therapist care   Has supportive family   No Suicidal / Homicidal ideation      She is taking Percocet for kidney stone pain and I suggested care with the Tylenol from Percocet and Tylenol by itself and she is aware not to mix them    Osteoarthritis of back  She has low back pain  She has no leg weakness       Allergic rhinitis due to pollen  Doing good from an allergy standpoint and does not have any asthma or eczema     She is not troubled with breathing, wheezing  She walks in the house and also take stairs  She can go up a flight of stairs    She has no exertional chest pain, chest tightness, short of breath, dizziness or lightheadedness    Hard to intubate  Difficulty intubation-as per her, she has not had any problem with this with the  surgeries she has had in the past    Difficult intravenous access  I suggest that the perioperative team be aware of this     Essential hypertension  On amlodipine      Hypertension-  Blood pressure is acceptable . I suggest continuation of ---amlodipine----- during the entire perioperative period. I suggest blood pressure  monitoring .I suggest addressing pain control as uncontrolled pain can increased blood pressure      It is unclear, if she is taking blood pressure medication on    CKD (chronic kidney disease), stage III  Creatinine stable  Stages of CKD discussed  Deleterious effects NSAID's , Beneficial effects of Hydration discussed   Acetaminophen ( If not intolerant ) as needed for pain     I  suggest monitoring renal function, in put and out put status socorro-operatively. I  suggest avoiding nephrotoxic medication including NSAIDs, COX2 inhibitors, intravenous contrast agent,avoiding hypotension to prevent further renal impairment.   Care with intravenous contrast discussed     Radiation cystitis  No blood in the urine    History of DVT (deep vein thrombosis)  She had blood clot in both of her legs in her vein in the postoperative setting while she was in the rehab  Her blood clot at that time seems to have been a provoked blood clot from reduced mobility in the postoperative setting in the rehab setting     History of DVT  NoPE  1 Episode  Associated with reduced mobility, no long journeys, no cancer, prior surgery, Hospital stay, noHRT  No IVC filter       She had no recurrence of blood clot  Increased risk of thrombosis in the socorro operative period , compression stocking use discussed     2021    No evidence of deep venous thrombosis in either lower extremity.       She has not had any blood clot recently    Cervical cancer  She had surgery, radiation and chemotherapy    History of cervical cancer, status post total vaginal hysterectomy and radiation treatment    She is doing good from that  standpoint    Gastro-esophageal reflux disease with esophagitis  Protonix as needed for reflux  Doing good  No difficulty swallowing    Internal hemorrhoids  She is doing good from a hemorrhoid standpoint    Fibromyalgia  No longer having problems    Refusal of blood transfusions as patient is Rastafari   I suggest that the perioperative team be aware of this so that appropriate   care can be taken      BMI 29.0-29.9,adult  Weight related conditions     Known to have     HTN  Acid reflux       Not troubled with / Not known to have       Type 2 Diabetes   Prediabetes   Gout   Hyperlipidemia   Sleep apnea   Fatty liver       Encouraged maintaining healthy weight for improved health     Fatty liver  CT Dec 2024   Liver: The liver is mildly enlarged.  Mild diffuse fatty infiltration.  No focal abnormality.     - Gallbladder: No calcified gallstones.     - Bile Ducts: No evidence of intra or extra hepatic biliary ductal dilation.     - Spleen: Negative.    No alcohol excess   This likely is not alcohol fatty liver disease      No history  of cirrhosis of liver or suggestions of Liver  decompensation   No Jaundice , dark urine , pale stool   No easy bleeding or bruising    Weight loss and follow up discussed        Preventive perioperative care    Thromboembolic prophylaxis:  Her risk factors for thrombosis include surgical procedure, previous history of thrombosis, and age.I suggest  thromboembolic prophylaxis ( mechanical/pharmacological, weighing the risk benefits of pharmacological agent use considering socorro procedural bleeding )  during the perioperative period.I suggested being active in the post operative period.      Postoperative pulmonary complication prophylaxis-Risk factors for post operative pulmonary complications include ASA class >2- I suggest incentive spirometry use, early ambulation, end tidal carbon dioxide monitoring, and pain control so as to avoid diaphragmatic splinting  , oral care , head  end of bed elevation      Renal complication prophylaxis-Risk factors for renal complications include pre-existing renal disease . I suggest keeping her well hydrated and avoidance/ minimizing the use of  NSAID's,JACOBSON 2 Inhibitors ,IV contrast if possible in the perioperative period.      Surgical site Infection Prophylaxis-I  suggest appropriate antibiotic for Prophylaxis against Surgical site infections  No reported Staph infection  Skin antibacterial discussed         In view of urological procedure the patient  is at risk of postoperative urinary retention.  I suggest avoidance / minimizing the of  Benzodiazepines,Anticholinergic medication,antihistamines ( Benadryl) , if possible in the perioperative period. I suggest using the minimum possible use of opioids for the minimum period of time in the perioperative period. Benadryl avoidance suggested      This visit was focused on Preoperative evaluation, Perioperative Medical management, complication reduction plans. I suggest that the patient follows up with primary care or relevant sub specialists for ongoing health care.    I appreciate the opportunity to be involved in this patients care. Please feel free to contact me if there were any questions about this consultation.    Patient is optimized    Patient/ care giver/ Family member was instructed to call and update me about any changes to health,  medication, office visits ,testing out side of the socorro operative care center , hospitalizations between now and surgery      Sofie Driver MD  Internal Medicine  [facility]   Cell Phone- [phone]    History of COVID -  no   COVID vaccination status -Yes    COVID screening     No fever   No cough   No SOB  No sore throat   No loss of taste or smell   No muscle aches   No nausea, vomiting , diarrhea     Abnormal coagulation profile in August of 2024-mildly abnormal APTT, normal PT   She was hospitalized around that time and may been  related to heparin use at that time    Not known to have coagulation factor deficiency, von Willebrand's disease or lupus anticoagulant    She usually does not have any bleeding problems with surgeries    Checked for over-the-counter medication      I have spent --70 ---- minutes of time which includes, time spent to prepare to see the patient , obtaining history ,performing examination, counseling/Educating the patient , Documenting clinical information in the record      --    2/21- 9 28     Called to follow up , spoke to her  to address any concerns with the up coming surgery or any questions on Medication instructions -  Doing well ,No changes to Medication, Health -

## 2025-02-18 NOTE — ASSESSMENT & PLAN NOTE
Doing good from a migraine standpoint  She takes Tylenol as needed  No tingling numbness of the hands and feet    Doing good from a mental health stand point   Not under psychiatry , Therapist care   Has supportive family   No Suicidal / Homicidal ideation      She is taking Percocet for kidney stone pain and I suggested care with the Tylenol from Percocet and Tylenol by itself and she is aware not to mix them

## 2025-02-18 NOTE — ASSESSMENT & PLAN NOTE
On amlodipine      Hypertension-  Blood pressure is acceptable . I suggest continuation of ---amlodipine----- during the entire perioperative period. I suggest blood pressure  monitoring .I suggest addressing pain control as uncontrolled pain can increased blood pressure      It is unclear, if she is taking blood pressure medication on

## 2025-02-18 NOTE — ASSESSMENT & PLAN NOTE
She had blood clot in both of her legs in her vein in the postoperative setting while she was in the rehab  Her blood clot at that time seems to have been a provoked blood clot from reduced mobility in the postoperative setting in the rehab setting     History of DVT  NoPE  1 Episode  Associated with reduced mobility, no long journeys, no cancer, prior surgery, Hospital stay, noHRT  No IVC filter       She had no recurrence of blood clot  Increased risk of thrombosis in the socorro operative period , compression stocking use discussed     2021    No evidence of deep venous thrombosis in either lower extremity.       She has not had any blood clot recently

## 2025-02-18 NOTE — HPI
History of present illness- I had the pleasure of meeting this pleasant 60 y.o. lady in the pre op clinic prior to her elective Urological surgery. The patient is new to me .   Offered to have family/ friends on the phone during the evaluation process  .    He visit chart review this AM-  [phone]    I have obtained the history by speaking to the patient and by reviewing the electronic health records.    Events leading up to surgery / History of presenting illness -    Right ureteral calculus      Cervical Cancer s/p XRT & Bilateral Ureteral strictures with obstructive uropathy  s/p right  Nephrostomy tube    As per chart -Right ureteral calculus in a patient with history of colon conduit for ureteral stenosis from radiation therapy.  Status post colon conduit 9/11/2020.     Had  colon perforation- had a diverting colostomy    She had a left ureteral conduit scarring and this was repaired on 2/6/2024 and the colostomy was reversed at the same time.    She is currently having a nephrostomy on the right side and has been having pain in the right loin area for a few weeks time affecting her ability to sleep  She just finished antibiotic for urinary tract infection and she currently feels that she does not have any urinary tract infection    She is passing urine through the urethra and also having urine coming through the nephrostomy tube    She is hoping to have her kidney stone addressed due to the pain    Relevant health conditions of significance for the perioperative period/ History of presenting illness -    Jehovah Witness  Difficulty intubation-as per her, she has not had any problem with this with the surgery she has had  History of cervical cancer, status post total vaginal hysterectomy and radiation treatment  History of DVT-lower extremity-none lately  History of stroke age 10 Y  Renal impairment  Migraine  Difficult IV access  Allergies-sneezing    Lives with her  in a ground level apartment  complex  Currently not working  Pets-2 pet dogs  Children -2 twin daughters - grown-age 24 years  Pregnancies - 1  Miscarriages - None   C sections - 1  Has help post op    Not known to have HTN, heart problem , Prediabetes , Diabetes Mellitus, Lung problem, Thyroid problem,  pulmonary embolism, acid reflux, sleep apnea, COVID infection, fatty liver , blood vessels stent , tobacco smoking,  mental health problems , gout, constipation

## 2025-02-18 NOTE — ASSESSMENT & PLAN NOTE
Creatinine stable  Stages of CKD discussed  Deleterious effects NSAID's , Beneficial effects of Hydration discussed   Acetaminophen ( If not intolerant ) as needed for pain     I  suggest monitoring renal function, in put and out put status socorro-operatively. I  suggest avoiding nephrotoxic medication including NSAIDs, COX2 inhibitors, intravenous contrast agent,avoiding hypotension to prevent further renal impairment.   Care with intravenous contrast discussed    4

## 2025-02-24 ENCOUNTER — TELEPHONE (OUTPATIENT)
Dept: UROLOGY | Facility: CLINIC | Age: 60
End: 2025-02-24
Payer: MEDICAID

## 2025-02-24 DIAGNOSIS — R82.79 POSITIVE URINE CULTURE: Primary | ICD-10-CM

## 2025-02-24 RX ORDER — AMPICILLIN 500 MG/1
500 CAPSULE ORAL EVERY 6 HOURS
Qty: 20 CAPSULE | Refills: 0 | Status: SHIPPED | OUTPATIENT
Start: 2025-02-24

## 2025-02-24 NOTE — TELEPHONE ENCOUNTER
Ampicillin was sent for the patient.  Instructed her to take 2 doses today and one in the am.  She expressed understanding.

## 2025-02-25 ENCOUNTER — HOSPITAL ENCOUNTER (OUTPATIENT)
Facility: HOSPITAL | Age: 60
Discharge: HOME | End: 2025-02-25
Attending: UROLOGY | Admitting: UROLOGY
Payer: MEDICAID

## 2025-02-25 VITALS
DIASTOLIC BLOOD PRESSURE: 67 MMHG | RESPIRATION RATE: 18 BRPM | HEART RATE: 72 BPM | SYSTOLIC BLOOD PRESSURE: 145 MMHG | HEIGHT: 69 IN | BODY MASS INDEX: 29.48 KG/M2 | TEMPERATURE: 98 F | WEIGHT: 199.06 LBS | OXYGEN SATURATION: 100 %

## 2025-02-25 DIAGNOSIS — N20.0 NEPHROLITHIASIS: Primary | ICD-10-CM

## 2025-02-25 PROCEDURE — 87186 SC STD MICRODIL/AGAR DIL: CPT

## 2025-02-25 PROCEDURE — 87088 URINE BACTERIA CULTURE: CPT

## 2025-02-25 PROCEDURE — 63600175 PHARM REV CODE 636 W HCPCS

## 2025-02-25 PROCEDURE — 25000003 PHARM REV CODE 250

## 2025-02-25 PROCEDURE — 87086 URINE CULTURE/COLONY COUNT: CPT | Mod: 59

## 2025-02-25 RX ORDER — GLUCAGON 1 MG
1 KIT INJECTION
OUTPATIENT
Start: 2025-02-25

## 2025-02-25 RX ORDER — ONDANSETRON HYDROCHLORIDE 2 MG/ML
4 INJECTION, SOLUTION INTRAVENOUS DAILY PRN
OUTPATIENT
Start: 2025-02-25

## 2025-02-25 RX ORDER — FENTANYL CITRATE 50 UG/ML
25 INJECTION, SOLUTION INTRAMUSCULAR; INTRAVENOUS EVERY 5 MIN PRN
Refills: 0 | OUTPATIENT
Start: 2025-02-25

## 2025-02-25 RX ORDER — SODIUM CHLORIDE 9 MG/ML
INJECTION, SOLUTION INTRAVENOUS CONTINUOUS
Status: DISCONTINUED | OUTPATIENT
Start: 2025-02-25 | End: 2025-02-25 | Stop reason: HOSPADM

## 2025-02-25 RX ADMIN — GENTAMICIN SULFATE 227.6 MG: 40 INJECTION, SOLUTION INTRAMUSCULAR; INTRAVENOUS at 09:02

## 2025-02-25 RX ADMIN — SODIUM CHLORIDE: 9 INJECTION, SOLUTION INTRAVENOUS at 08:02

## 2025-02-25 NOTE — PROGRESS NOTES
Pt's procedure cancelled today due to urine sample. Samples sent to lab for culture. Completed Surgical consent placed in chart. PIV removed, catheter intact. Pt's  updated via phone and will come for . Pt and  understands Dr. Balbuena's office will contact pt to reschedule.

## 2025-02-25 NOTE — DISCHARGE SUMMARY
[facility] (2nd [facility]  Discharge Note  Short Stay    Procedure(s) (LRB): none      OUTCOME: case cancelled due to infected urine    DISPOSITION: Home or Self Care    FINAL DIAGNOSIS:  Nephrolithiasis    FOLLOWUP: In clinic    DISCHARGE INSTRUCTIONS:    Discharge Procedure Orders   Lifting restrictions   Order Comments: Do not drive while taking pain medications. No strenuous activity or lifting greater than 10 pounds for 4 weeks.     No dressing needed   Order Comments: Do not soak incisions in tub or bath and do not scrub incisions. You may shower over incisions. If you have surgical glue in place, the glue will come off over the next few weeks.     Notify your health care provider if you experience any of the following:  temperature >100.4     Notify your health care provider if you experience any of the following:  persistent nausea and vomiting or diarrhea     Notify your health care provider if you experience any of the following:  severe uncontrolled pain     Notify your health care provider if you experience any of the following:  redness, tenderness, or signs of infection (pain, swelling, redness, odor or green/yellow discharge around incision site)     Notify your health care provider if you experience any of the following:  difficulty breathing or increased cough     Notify your health care provider if you experience any of the following:  persistent dizziness, light-headedness, or visual disturbances        TIME SPENT ON DISCHARGE: 15 minutes    The urine was nitrite positive from both the nephrostomy and the colon conduit.  Case was cancelled and will have the nephrostomy tube changed and reschedule.  Urine sent for culture.

## 2025-02-25 NOTE — INTERVAL H&P NOTE
The patient has been examined and the H&P has been reviewed:    The patient had nitrite positive urine from the nephrostomy tube and the conduit.  The case was cancelled and will be rescheduled after she has had the nephrostomy tube exchanged.

## 2025-02-25 NOTE — PROGRESS NOTES
Urine specimen collected from urostomy tube for order # [id number].    Urine specimen collected from nephrostomy tube for order # [id number].

## 2025-02-27 LAB
BACTERIA UR CULT: ABNORMAL
BACTERIA UR CULT: NO GROWTH

## 2025-03-14 NOTE — SUBJECTIVE & OBJECTIVE
Subjective:     Interval History: NAEON. Pt overall feeling well. Tolerating diet. Denies N/V. Admits flatus. Reports pain is well controlled. OOB as tolerated.     Post-Op Info:  Procedure(s) (LRB):  REVISION, ANASTOMOSIS, URETEROILEAL (N/A)  OPEN LAPAROTOMY, EXPLORATORY (N/A)  CLOSURE, ILEOSTOMY (N/A)  ANASTOMOSIS, INTESTINE - Ileocolic anastomosis (N/A)  EXCISION, SMALL INTESTINE  LYSIS, ADHESIONS  LYSIS, ADHESIONS   12 Days Post-Op      Medications:  Continuous Infusions:  Scheduled Meds:   acetaminophen  1,000 mg Oral Q8H    heparin (porcine)  5,000 Units Subcutaneous Q8H    sodium chloride 0.9%  10 mL Intravenous Q6H     PRN Meds:   ibuprofen    iohexol    LIDOcaine HCL 10 mg/ml (1%)    melatonin    ondansetron    oxyCODONE    prochlorperazine    sodium chloride 0.9%    sodium chloride 0.9%        Objective:     Vital Signs (Most Recent):  Temp: 98.1 °F (36.7 °C) (02/18/24 0854)  Pulse: 84 (02/18/24 0854)  Resp: 18 (02/18/24 0854)  BP: (!) 109/55 (02/18/24 0854)  SpO2: 95 % (02/18/24 0854) Vital Signs (24h Range):  Temp:  [97.7 °F (36.5 °C)-98.7 °F (37.1 °C)] 98.1 °F (36.7 °C)  Pulse:  [72-88] 84  Resp:  [18-20] 18  SpO2:  [94 %-100 %] 95 %  BP: ()/(50-68) 109/55     Intake/Output - Last 3 Shifts         02/16 0700  02/17 0659 02/17 0700  02/18 0659 02/18 0700  02/19 0659    P.O.  2100     IV Piggyback  999     Total Intake(mL/kg)  3099 (37.8)     Urine (mL/kg/hr)  2650 (1.3) 200 (1.2)    Emesis/NG output  0 0    Other  0 0    Stool  0 0    Total Output  2650 200    Net  +449 -200           Stool Occurrence 1 x 1 x 0 x    Emesis Occurrence  0 x 0 x             Physical Exam     Constitutional:       General: She is not in acute distress.  Cardiovascular:      Rate and Rhythm: Normal rate.      Pulses: Normal pulses.   Pulmonary:      Effort: Pulmonary effort is normal. No respiratory distress.   Abdominal:      General: There is no distension. Inicisons c/d/I, drain minimal out, urostomy functional,   Psychiatric Progress Note        DATA:  Subjective/Behavioral Description:  Patient seen in session along with staff. Patient describes mood as improving and \"today's ok\". Appears to be somewhat withdrawn. Notes mornings are still difficult and doesn't feel like waking up and isn't sure if he likes that he even woke up.  Patient is engaged but minimally participating., Patient is struggling to practice coping skills, is receptive to feedback, and is cooperative but quiet. Patient visible in the milieu but did not engage with peers or staff. They remained physically present without participating in interactions.    Patient states staying active has been helpful. Notes appetite has been improving.     Patient denies side effects to medications.  Patient reports SI, denies HI, denies AH, denies VH, denies delusional content.      Mental Status Evaluation:   Appearance: age appropriate   Behavior: Withdrawn and Guarded   Speech: soft   Mood: dysthymic   Affect: blunted   Thought Process: normal   Thought Content: suicidal   Sensorium: person, place, and time/date   Cognition: poor   Insight: limited   Judgment: limited       MEDICATIONS:  Current Facility-Administered Medications   Medication Dose Route Frequency Provider Last Rate Last Admin    haloperidol lactate (HALDOL) 5 MG/ML short-acting injection 5 mg  5 mg Intramuscular Q4H PRN Sharon Coker MD        haloperidol (HALDOL) tablet 5 mg  5 mg Oral Q4H PRN Sharon Coker MD        ondansetron (ZOFRAN ODT) disintegrating tablet 4 mg  4 mg Oral Q6H PRN Alisha Khan MD   4 mg at 03/14/25 1107    lisinopril (ZESTRIL) tablet 10 mg  10 mg Oral Daily Alisha Khan MD   10 mg at 03/14/25 0845    metoCLOPramide (REGLAN) tablet 5 mg  5 mg Oral Q4H PRN Alisha Khan MD        escitalopram (LEXAPRO) tablet 5 mg  5 mg Oral Daily Zeb Bergman DO   5 mg at 03/14/25 0845    bisacodyl (DULCOLAX) suppository 10 mg  10 mg Rectal Daily PRN Alisha Khan MD         traZODone (DESYREL) tablet 50 mg  50 mg Oral Nightly PRN Sharon Coker MD   50 mg at 03/13/25 2136    benztropine mesylate (COGENTIN) 1 MG/ML injection 1 mg  1 mg Intramuscular Q8H PRN Sharon Coker MD        Or    benztropine (COGENTIN) tablet 1 mg  1 mg Oral Q8H GWENDOLYNN Sharon Coker MD        hydrOXYzine (ATARAX) tablet 50 mg  50 mg Oral Q4H PRN Sharon Coker MD   50 mg at 03/13/25 2136    LORazepam (ATIVAN) tablet 1 mg  1 mg Oral Q4H PRN Sharon Coker MD        Or    LORazepam (ATIVAN) injection 1 mg  1 mg Intramuscular Q4H PRN Sharon Coker MD        acetaminophen (TYLENOL) tablet 500 mg  500 mg Oral Q4H PRN Sharon Coker MD        Or    acetaminophen (TYLENOL) tablet 650 mg  650 mg Oral Q4H PRN Sharon Coker MD        Or    acetaminophen (TYLENOL) tablet 1,000 mg  1,000 mg Oral Q6H PRN Sharon Coker MD        polyethylene glycol (MIRALAX) packet 17 g  17 g Oral Daily PRN Sharon Coker MD   17 g at 03/13/25 0942    docusate sodium-sennosides (SENOKOT S) 50-8.6 MG 2 tablet  2 tablet Oral BID PRN Sharon Coker MD   2 tablet at 03/12/25 1055    magnesium hydroxide (MILK OF MAGNESIA) 400 MG/5ML suspension 30 mL  30 mL Oral Daily PRN Sharon Coker MD        aluminum-magnesium hydroxide-simethicone (MAALOX) 200-200-20 MG/5ML suspension 30 mL  30 mL Oral Q4H PRN Sharon Coker MD        docusate sodium (COLACE) capsule 100 mg  100 mg Oral BID PRN Kesha Landrum MD   100 mg at 03/13/25 0942    folic acid (FOLATE) tablet 1 mg  1 mg Oral Daily Kesha Landrum MD   1 mg at 03/14/25 0845    linaclotide (LINZESS) capsule 145 mcg  145 mcg Oral QAM AC Kesha Landrum MD   145 mcg at 03/14/25 0625    thiamine (VITAMIN B1) tablet 100 mg  100 mg Oral Daily Kesha Landrum MD   100 mg at 03/14/25 0845       LABS:    Recent Labs   Lab 03/11/25  1720 03/08/25  0813   WBC 6.2 4.7   HGB 14.3 13.8    202       Recent Labs   Lab 03/11/25  1720 03/08/25  0813   CO2 25 28   BUN 11 9  preivous ileostomy site with scan drainage     Palpations: Abdomen is soft.      Tenderness:Minimally tender  Neurological:      General: No focal deficit present.      Mental Status: She is alert and oriented to person, place, and time.    Significant Labs:  All pertinent lab results within the last 24 hours have been reviewed.     Significant Diagnostics:  I have reviewed all pertinent imaging results/findings within the past 24 hours.     CREATININE 0.78 1.02   GLUCOSE 93 95   CALCIUM 8.7 9.0       No results found    Invalid input(s): \"CBMZ\"     No results found    Invalid input(s): \"TTLCHOL\", \"HDLCHOLSTRL\", \"LDLCA\"    Results for orders placed or performed during the hospital encounter of 03/11/25   Basic Metabolic Panel    Specimen: Blood, Venous   Result Value    Fasting Status     Sodium 133 (L)    Potassium 3.9     Comment: Slight to moderate hemolysis, result may be falsely increased.    Chloride 102    Carbon Dioxide 25    Anion Gap 10    Glucose 93    BUN 11    Creatinine 0.78    Glomerular Filtration Rate >90     Comment: eGFR results = or >60 mL/min/1.73m2 = Normal kidney function. Estimated GFR calculated using the CKD-EPI-R (2021) equation that does not include race in the creatinine calculation.    BUN/Cr 14    Calcium 8.7   Hepatic Function Panel    Specimen: Blood, Venous   Result Value    Albumin 3.6    Bilirubin, Total 0.6    Bilirubin, Direct <0.1     Comment: Direct bilirubin may be falsely decreased due to hemolysis.    Alkaline Phosphatase 44 (L)     Comment: Low Alkaline Phosphatase results may indicate hypophosphatasia, malnutrition, hypothyroidism, or other disease states. Correlate with clinical symptoms.    GPT/ALT 28    GOT/AST 31     Comment: Slight to moderate hemolysis, result may be falsely increased.    Protein, Total 7.0   Magnesium    Specimen: Blood, Venous   Result Value    Magnesium 2.3     Comment: Slight to moderate hemolysis, result may be falsely increased.   Alcohol    Specimen: Blood, Venous   Result Value    Alcohol None Detected   Drug Abuse Screen, Urine    Specimen: Urine clean catch   Result Value    Amphetamines, Urine Negative     Comment: Cutoff = 500 ng/mL    Barbiturates, Urine Negative     Comment: Cutoff = 200 ng/mL    Benzodiazepines, Urine Negative     Comment: Cutoff = 200 ng/mL    Cocaine/ Metabolite, Urine Negative     Comment: Cutoff = 150 ng/ml    Opiates, Urine Negative     Comment:  Cutoff = 300 ng/mL    Phencyclidine, Urine Negative     Comment: Cutoff = 25 ng/mL    Cannabinoids, Urine Negative     Comment: Cutoff = 50 ng/mL    Fentanyl, Urine Screen Negative     Comment: Cutoff = 1.0 ng/mL   TROPONIN I, HIGH SENSITIVITY    Specimen: Blood, Venous   Result Value    Troponin I, High Sensitivity 6   CBC with Automated Differential (performable only)    Specimen: Blood, Venous   Result Value    WBC 6.2    RBC 4.07 (L)    HGB 14.3    HCT 42.5    .4 (H)    MCH 35.1 (H)    MCHC 33.6    RDW-CV 12.2    RDW-SD 47.1        NRBC 0    Neutrophil, Percent 57    Lymphocytes, Percent 28    Mono, Percent 12    Eosinophils, Percent 2    Basophils, Percent 1    Immature Granulocytes 0    Absolute Neutrophils 3.6    Absolute Lymphocytes 1.7    Absolute Monocytes 0.7    Absolute Eosinophils  0.1    Absolute Basophils 0.0    Absolute Immature Granulocytes 0.0   Light Blue Top    Specimen: Blood, Venous   Result Value    Extra Tube Hold for Add Ons   Light Green Top    Specimen: Blood, Venous   Result Value    Extra Tube Hold for Add Ons   Lavender Top    Specimen: Blood, Venous   Result Value    Extra Tube Hold for Add Ons   Gold Top    Specimen: Blood, Venous   Result Value    Extra Tube Hold for Add Ons   COVID/Flu/RSV panel    Specimen: Nasal, Mid-turbinate; Swab   Result Value    Rapid SARS-COV-2 by PCR Not Detected    Influenza A by PCR Not Detected    Influenza B by PCR Not Detected    RSV BY PCR Not Detected    Isolation Guidelines      Comment: Do not use this test result as the sole decision-maker for discontinuation of isolation.   Clinical evaluation should be considered for other respiratory illness requiring transmission-based isolation.    -    No fever (<99.0 F/37.2 C) for at least 24 hours without the use of fever-reducing medications    AND  -    Respiratory symptoms have improved or resolved (e.g. cough, shortness of breath)     AND  -    COVID-19 negative test    See COVID-19  Deisolation Resource Guide    Procedural Comment      Comment: SARS-COV-2 nucleic acid has not been detected indicating the absence of COVID-19.    This test was performed using the [employer] Xpert Xpress SARS-CoV-2/Flu/RSV RT-PCR test that has been given Emergency Use Authorization (EUA) by the [place] Food and Drug Administration (FDA). These tests are considered definitive and do not need to be confirmed by another method.   Glycohemoglobin    Specimen: Blood, Venous   Result Value    Hemoglobin A1C 5.6     Comment:   Diabetic Screening  Non Diabetic:             <5.7%  Increased Risk:           5.7-6.4%  Diagnostic For Diabetes:  >6.4%    Diabetic Control  A1C%       eAG mg/dL  6.0            126  6.5            140  7.0            154  7.5            169  8.0            183  8.5            197  9.0            212  9.5            226  10.0           240   Lipid Panel With Reflex    Specimen: Blood, Venous   Result Value    Cholesterol 148     Comment:   Desirable         <200  Borderline High   200 to 239  High              >=240    Triglycerides 69     Comment:   Normal            <150  Borderline High   150 to 199  High              200 to 499  Very High         >=500    HDL 66     Comment:   Low              <40  Borderline Low   40 to 49  Near Optimal     50 to 59  Optimal          >=60    LDL 68     Comment:   Optimal           <100  Near Optimal      100 to 129  Borderline High   130 to 159  High              160 to 189  Very High         >=190    Non-HDL Cholesterol 82     Comment:   Therapeutic Target:  CHD and risk equivalents  <130  Multiple risk factors     <160  0 to 1 risk factor        <190    Cholesterol/ HDL Ratio 2.2   Thyroid Stimulating Hormone Reflex    Specimen: Blood, Venous   Result Value    TSH 2.630     Comment: Findings most consistent with euthyroid state, no additional testing suggested. TSH may be normal in patients with thyroid dysfunction and pituitary disease. Clinical  correlation recommended.    (Reflex TSH algorithm is not recommended in hospitalized patients. A variety of drugs, as well as serious acute and chronic illnesses may alter thyroid function tests. Commonly implicated drugs include glucocorticoids, dopamine, carbamazepine, iodine, amiodarone, lithium and heparin.)   Electrocardiogram 12-Lead   Result Value    Ventricular Rate EKG/Min (BPM) 59    Atrial Rate (BPM) 59    WA-Interval (MSEC) 204    QRS-Interval (MSEC) 98    QT-Interval (MSEC) 404    QTc 400    P Axis (Degrees) 42    R Axis (Degrees) -38    T Axis (Degrees) 16    REPORT TEXT      Sinus bradycardia  Left axis deviation  Incomplete right bundle branch block  Abnormal ECG  When compared with ECG of  27-NOV-2021 18:23,  No significant change was found  Confirmed by JALIL CLEMENS MD ([id number]) on 3/12/2025 12:14:19 PM         Diagnosis:  MDD, recurrent, severe  -Suicidal Ideation    PLAN: :   Patient to continue inpatient care - to adhere to medication management and group attendance.  Patient is at low risk of harm to self or others at this time  Education provided on above diagnosis, medication options inc risks benefits and any black box warnings  Psychopharmacologic Intervention:  -Increased lexapro 10mg QD  LABS reviewed  --------------------------------------  Psychoeducation provided  Brief CBT Provided  Discharge planning per primary team with social work, family session if needed        Patient continues to meet medical necessary criteria for current level of care.

## 2025-03-19 NOTE — ASSESSMENT & PLAN NOTE
-chronic  -H/H stable at admission  -continue home iron supplement  -trend CBC, transfuse as indicated  -monitor   
-chronic  -PRN supportive care as indicated  -fall precautions  
-chronic  -controlled  -no home antihypertensives  -address if indicated  -monitor   
-chronic  -no home therapies   -PRN supportive care as indicated    
59yoF w/complex urological history currently with ileostomy in place with concern for right sided hydro and ODESSA     - Appears there may be small debris in right distal ureter near ureteroileal anastomosis   - Right sided hydronephrosis slightly worsened from prior scan   - Currently low concern for infection    - IR consult for placement of right nephrostomy tube   - Will obtain UA/UCx at time of nephrostomy tube placement, will treat if needed ahead of urologic procedure   - Plan for antegrade ureteroscopy to evaluate right ureter after NT placement   - Recommend admission to hospital medicine for management of comorbidities   - Flomax    - Recommend IVF   - Please reach out to urology team if patient's clinical status changes    Please feel free to reach out with any questions.   
59yoF w/complex urological history currently with ileostomy in place with concern for right sided hydro and ODESSA.      - IR consult for placement of right nephrostomy tube, the patient has been NPO    - Appears there may be small debris in right distal ureter near ureteroileal anastomosis   - Right sided hydronephrosis slightly worsened from prior scan   - Currently low concern for infection    - Urine culture pending, will follow up    - Will obtain UA/UCx at time of nephrostomy tube placement, will treat if needed ahead of urologic procedure    - Plan for antegrade ureteroscopy to evaluate right ureter after NT placement   - Recommend admission to hospital medicine for management of comorbidities   - Recommend Flomax   - Recommend IVF   - Please reach out to urology team if patient's clinical status changes    Please feel free to reach out with any questions.   
59yoF w/complex urological history currently with ileostomy in place with concern for right sided hydro and ODESSA.      - s/p R NT placement on 8/8    - Appears there may be small debris in right distal ureter near ureteroileal anastomosis   - Right sided hydronephrosis slightly worsened from prior scan   - Currently low concern for infection    - Urine culture growing multiple organisms, recommending treating this with broad spectrum oral abx for 2 weeks in light of upcoming urologic procedure    - Will plan for antegrade ureteroscopy to evaluate right ureter after NT placement.  Will arrange outpatient urology f/u for this.     - Recommend admission to hospital medicine for management of comorbidities   - Recommend Flomax   - Recommend IVF   - Please reach out to urology team if patient's clinical status changes   - urology to sign off at this time    Please feel free to reach out with any questions.   
R hydronephrosis  Nephrostomy status  ODESSA on CKD  CKD, stage III  -admitted due to R hydronephrosis with complicated urological and colorectal history detailed in HPI  -at admission HDS and afebrile  -admission workup: CBC with WBC 12, H/H 11.9/41, platelets 288. PT/INR 11/1.0, PTT 34. Chemistry with , K 4.2, chloride 112, CO2 18, BUN 16, SCr 2.0, glucose 103. LFTs WNL. UA 1+ protein, 1+ blood, nitrate positive, 3+ leukocytes, WBC 61, many bacteria. Urine culture in process. CT A/P severe right-sided hydronephrosis, secondary to an approximately 6 mm linear calculus just proximal to the anastomosis. Postoperative changes of urinary diversion and left lower quadrant ileostomy. Mild left ureteral wall thickening which can be seen with pyelitis, recommend correlation with urinalysis.  Additional nonobstructing bilateral nephrolithiasis.  -SCr at admission 2.0 >>> baseline SCr 1.2-1.5  -Urology consulted in ED with recommendations for IR consult for placement of right nephrostomy tube   -start levofloxacin   -strict I&Os  -PRN supportive care as indicated  -trend labs, address/replete electrolytes as indicated    - repeat CT abdomen - patient reports worsening abdominal pain  
R hydronephrosis  Nephrostomy status  ODESSA on CKD  CKD, stage III  -admitted due to R hydronephrosis with complicated urological and colorectal history detailed in HPI  -at admission HDS and afebrile  -admission workup: CBC with WBC 12, H/H 11.9/41, platelets 288. PT/INR 11/1.0, PTT 34. Chemistry with , K 4.2, chloride 112, CO2 18, BUN 16, SCr 2.0, glucose 103. LFTs WNL. UA 1+ protein, 1+ blood, nitrate positive, 3+ leukocytes, WBC 61, many bacteria. Urine culture in process. CT A/P severe right-sided hydronephrosis, secondary to an approximately 6 mm linear calculus just proximal to the anastomosis. Postoperative changes of urinary diversion and left lower quadrant ileostomy. Mild left ureteral wall thickening which can be seen with pyelitis, recommend correlation with urinalysis.  Additional nonobstructing bilateral nephrolithiasis.  -SCr at admission 2.0 >>> baseline SCr 1.2-1.5  -Urology consulted in ED with recommendations for IR consult for placement of right nephrostomy tube   -start levofloxacin. Switched to Cefepime.   -strict I&Os  -PRN supportive care as indicated  -trend labs, address/replete electrolytes as indicated  - repeat CT abdomen - no significant changes    - IVF today    
R hydronephrosis  Nephrostomy status  ODESSA on CKD  CKD, stage III  -admitted due to R hydronephrosis with complicated urological and colorectal history detailed in HPI  -at admission HDS and afebrile  -admission workup: CBC with WBC 12, H/H 11.9/41, platelets 288. PT/INR 11/1.0, PTT 34. Chemistry with , K 4.2, chloride 112, CO2 18, BUN 16, SCr 2.0, glucose 103. LFTs WNL. UA 1+ protein, 1+ blood, nitrate positive, 3+ leukocytes, WBC 61, many bacteria. Urine culture in process. CT A/P severe right-sided hydronephrosis, secondary to an approximately 6 mm linear calculus just proximal to the anastomosis. Postoperative changes of urinary diversion and left lower quadrant ileostomy. Mild left ureteral wall thickening which can be seen with pyelitis, recommend correlation with urinalysis.  Additional nonobstructing bilateral nephrolithiasis.  -SCr at admission 2.0 >>> baseline SCr 1.2-1.5  -Urology consulted in ED with recommendations for IR consult for placement of right nephrostomy tube   -start levofloxacin. Switched to Cefepime.   -strict I&Os  -PRN supportive care as indicated  -trend labs, address/replete electrolytes as indicated  - repeat CT abdomen - no significant changes  - discontinued IVF  
R hydronephrosis  Nephrostomy status  ODESSA on CKD  CKD, stage III  -admitted due to R hydronephrosis with complicated urological and colorectal history detailed in HPI  -at admission HDS and afebrile  -admission workup: CBC with WBC 12, H/H 11.9/41, platelets 288. PT/INR 11/1.0, PTT 34. Chemistry with , K 4.2, chloride 112, CO2 18, BUN 16, SCr 2.0, glucose 103. LFTs WNL. UA 1+ protein, 1+ blood, nitrate positive, 3+ leukocytes, WBC 61, many bacteria. Urine culture in process. CT A/P severe right-sided hydronephrosis, secondary to an approximately 6 mm linear calculus just proximal to the anastomosis. Postoperative changes of urinary diversion and left lower quadrant ileostomy. Mild left ureteral wall thickening which can be seen with pyelitis, recommend correlation with urinalysis.  Additional nonobstructing bilateral nephrolithiasis.  -SCr at admission 2.0 >>> baseline SCr 1.2-1.5  -Urology consulted in ED with recommendations for IR consult for placement of right nephrostomy tube and no ABXs at current due to low infection concern  -IR consulted  -NPO at MN  -begin ABXs if become clinically indicated  -strict I&Os  -PRN supportive care as indicated  -trend labs, address/replete electrolytes as indicated  
1 pair

## 2025-04-21 ENCOUNTER — TELEPHONE (OUTPATIENT)
Dept: INTERVENTIONAL RADIOLOGY/VASCULAR | Facility: CLINIC | Age: 60
End: 2025-04-21
Payer: MEDICAID

## 2025-04-30 ENCOUNTER — TELEPHONE (OUTPATIENT)
Dept: UROLOGY | Facility: CLINIC | Age: 60
End: 2025-04-30
Payer: MEDICAID

## 2025-04-30 NOTE — TELEPHONE ENCOUNTER
----- Message from Nurse Thayer sent at 4/29/2025  4:12 PM CDT -----    ----- Message -----  From: Shaylee Gutiérrez MA  Sent: 4/29/2025   3:37 PM CDT  To: S[facility] Nelson Staff    Type:  Appointment Request  Name of Caller:Pts RN case manager with [facility] When is the first available appointment?No accessWould the patient rather a call back or a response via [facility]? Call Best Call Back Number:[phone] Information:Pt is looking for apt to check on Nephostomy tube(tube in kidney)

## 2025-05-01 ENCOUNTER — TELEPHONE (OUTPATIENT)
Dept: INTERVENTIONAL RADIOLOGY/VASCULAR | Facility: HOSPITAL | Age: 60
End: 2025-05-01
Payer: MEDICAID

## 2025-05-01 NOTE — NURSING
Pre-procedure call complete.  2 patient identifier used (name and ).   Arrival time 0930.  Patient instructed not to eat  anything after midnight the night before procedure.  Patient aware will need someone to provide transport home and monitor pt 8 hours post procedure.  No driving for at least 24 hours after procedure.   Patient advised to take blood pressure, heart medications, with a sip of water morning of procedure.  Patient verbalized aware of which medications to take.  Do not take  sleep medication (including OTC) and anxiety medication the night before procedure.  Arrival time and location given.  Expected length of stay reviewed.  Covid screening completed.  Patient verbalized understanding of all pre-procedure instructions.

## 2025-05-05 ENCOUNTER — LAB VISIT (OUTPATIENT)
Dept: LAB | Facility: HOSPITAL | Age: 60
End: 2025-05-05
Attending: FAMILY MEDICINE
Payer: MEDICAID

## 2025-05-05 ENCOUNTER — OFFICE VISIT (OUTPATIENT)
Dept: UROLOGY | Facility: CLINIC | Age: 60
End: 2025-05-05
Payer: MEDICAID

## 2025-05-05 VITALS
BODY MASS INDEX: 31.03 KG/M2 | DIASTOLIC BLOOD PRESSURE: 78 MMHG | HEART RATE: 87 BPM | WEIGHT: 210.13 LBS | SYSTOLIC BLOOD PRESSURE: 119 MMHG

## 2025-05-05 DIAGNOSIS — N20.1 RIGHT URETERAL CALCULUS: Primary | ICD-10-CM

## 2025-05-05 DIAGNOSIS — N13.1 HYDRONEPHROSIS WITH URETERAL STRICTURE: ICD-10-CM

## 2025-05-05 LAB
ALBUMIN SERPL BCP-MCNC: 3.3 G/DL (ref 3.5–5.2)
ALP SERPL-CCNC: 116 UNIT/L (ref 40–150)
ALT SERPL W/O P-5'-P-CCNC: 15 UNIT/L (ref 10–44)
ANION GAP (OHS): 9 MMOL/L (ref 8–16)
AST SERPL-CCNC: 18 UNIT/L (ref 11–45)
BILIRUB SERPL-MCNC: 0.3 MG/DL (ref 0.1–1)
BUN SERPL-MCNC: 19 MG/DL (ref 8–23)
CALCIUM SERPL-MCNC: 9.3 MG/DL (ref 8.7–10.5)
CHLORIDE SERPL-SCNC: 105 MMOL/L (ref 95–110)
CO2 SERPL-SCNC: 27 MMOL/L (ref 23–29)
CREAT SERPL-MCNC: 1.2 MG/DL (ref 0.5–1.4)
GFR SERPLBLD CREATININE-BSD FMLA CKD-EPI: 51 ML/MIN/1.73/M2
GLUCOSE SERPL-MCNC: 88 MG/DL (ref 70–110)
POTASSIUM SERPL-SCNC: 4.5 MMOL/L (ref 3.5–5.1)
PROT SERPL-MCNC: 7.7 GM/DL (ref 6–8.4)
SODIUM SERPL-SCNC: 141 MMOL/L (ref 136–145)

## 2025-05-05 PROCEDURE — 3008F BODY MASS INDEX DOCD: CPT | Mod: CPTII,,, | Performed by: UROLOGY

## 2025-05-05 PROCEDURE — 99213 OFFICE O/P EST LOW 20 MIN: CPT | Mod: PBBFAC | Performed by: UROLOGY

## 2025-05-05 PROCEDURE — 3078F DIAST BP <80 MM HG: CPT | Mod: CPTII,,, | Performed by: UROLOGY

## 2025-05-05 PROCEDURE — 99213 OFFICE O/P EST LOW 20 MIN: CPT | Mod: S$PBB,,, | Performed by: UROLOGY

## 2025-05-05 PROCEDURE — 3074F SYST BP LT 130 MM HG: CPT | Mod: CPTII,,, | Performed by: UROLOGY

## 2025-05-05 PROCEDURE — 36415 COLL VENOUS BLD VENIPUNCTURE: CPT

## 2025-05-05 PROCEDURE — 99999 PR PBB SHADOW E&M-EST. PATIENT-LVL III: CPT | Mod: PBBFAC,,, | Performed by: UROLOGY

## 2025-05-05 PROCEDURE — 84075 ASSAY ALKALINE PHOSPHATASE: CPT

## 2025-05-06 ENCOUNTER — HOSPITAL ENCOUNTER (OUTPATIENT)
Dept: INTERVENTIONAL RADIOLOGY/VASCULAR | Facility: HOSPITAL | Age: 60
Discharge: HOME | End: 2025-05-06
Attending: FAMILY MEDICINE | Admitting: STUDENT IN AN ORGANIZED HEALTH CARE EDUCATION/TRAINING PROGRAM
Payer: MEDICAID

## 2025-05-06 VITALS
BODY MASS INDEX: 28.8 KG/M2 | DIASTOLIC BLOOD PRESSURE: 65 MMHG | WEIGHT: 195 LBS | OXYGEN SATURATION: 99 % | HEART RATE: 72 BPM | TEMPERATURE: 98 F | RESPIRATION RATE: 16 BRPM | SYSTOLIC BLOOD PRESSURE: 138 MMHG

## 2025-05-06 DIAGNOSIS — Z93.6 NEPHROSTOMY STATUS: Primary | ICD-10-CM

## 2025-05-06 DIAGNOSIS — N13.1 HYDRONEPHROSIS WITH URETERAL STRICTURE: ICD-10-CM

## 2025-05-06 PROCEDURE — 99900035 HC TECH TIME PER 15 MIN (STAT)

## 2025-05-06 PROCEDURE — 63600175 PHARM REV CODE 636 W HCPCS: Performed by: STUDENT IN AN ORGANIZED HEALTH CARE EDUCATION/TRAINING PROGRAM

## 2025-05-06 PROCEDURE — C1729 CATH, DRAINAGE: HCPCS

## 2025-05-06 PROCEDURE — 94761 N-INVAS EAR/PLS OXIMETRY MLT: CPT

## 2025-05-06 PROCEDURE — 50435 EXCHANGE NEPHROSTOMY CATH: CPT | Mod: RT | Performed by: STUDENT IN AN ORGANIZED HEALTH CARE EDUCATION/TRAINING PROGRAM

## 2025-05-06 PROCEDURE — C1769 GUIDE WIRE: HCPCS

## 2025-05-06 RX ORDER — SODIUM CHLORIDE 9 MG/ML
INJECTION, SOLUTION INTRAVENOUS CONTINUOUS
Status: DISCONTINUED | OUTPATIENT
Start: 2025-05-06 | End: 2025-05-07 | Stop reason: HOSPADM

## 2025-05-06 RX ORDER — LIDOCAINE HYDROCHLORIDE 10 MG/ML
1 INJECTION, SOLUTION EPIDURAL; INFILTRATION; INTRACAUDAL; PERINEURAL ONCE
Status: DISCONTINUED | OUTPATIENT
Start: 2025-05-06 | End: 2025-05-07 | Stop reason: HOSPADM

## 2025-05-06 RX ORDER — CEFTRIAXONE 1 G/50ML
INJECTION, SOLUTION INTRAVENOUS
Status: COMPLETED | OUTPATIENT
Start: 2025-05-06 | End: 2025-05-06

## 2025-05-06 RX ADMIN — CEFTRIAXONE 1 G: 1 INJECTION, SOLUTION INTRAVENOUS at 10:05

## 2025-05-06 NOTE — H&P
Interventional Radiology Pre-Procedure History & Physical      Chief Complaint/Reason for Referral: h/o urinary obstructions    History of Present Illness:  Edita Riley is a 60 y.o. female who presents for pcn tube exchange. Patient notes the tube has been malodorous     Past Medical History:   Diagnosis Date    Abnormal mammogram 08/25/2020    Anemia due to chronic blood loss     Anxiety     Bilateral ureteral obstruction 09/11/2020    obstructive uropathy now s/p transverse colon conduit, s/p right  Nephrostomy tube, s/p Ileostomy,    Cervical cancer 2014    s/p XRT & Bilateral Ureteral strictures with obstructive uropathy    Chronic back pain     CKD (chronic kidney disease)     Colon polyp 09/14/2015    Diarrhea due to malabsorption 11/14/2018    Difficult intubation     DVT of lower extremity, bilateral 11/04/2020    Emphysema of lung 04/10/2023    Family history of colon cancer 07/28/2020    Fibromyalgia     Folate deficiency     Fungemia 09/27/2020    GERD (gastroesophageal reflux disease)     Hard to intubate 04/20/2021    Hemifacial spasm 09/16/2015    Hiatal hernia 2014    Hypertension     Hyponatremia 09/10/2023    Impaired functional mobility, balance, gait, and endurance 07/24/2015    Lactose intolerance     Major depressive disorder, recurrent episode, moderate 06/02/2023    History of suicidal ideation    Metastatic squamous cell carcinoma to lymph node 10/11/2018    Nephrostomy complication 10/26/2023    Neuropathy due to chemotherapeutic drug 11/14/2018    Osteoarthritis of back     Peritonitis 09/22/2020    QT prolongation 04/16/2021    Refusal of blood transfusions as patient is [employer]     Schatzki's ring 09/14/2015    Seen on outside EGD 05/2014, underwent esophageal dilatation. Bx were negative.     Seizure-like activity 12/02/2018    Sleep stage dysfunction     Noted on PSG 06/2017; negative for obstructive sleep apnea     Small bowel obstruction 08/19/2023    Stroke      Urinary tract infection associated with nephrostomy catheter 06/11/2020    recurrent MDR UTI/pyelonephritis     Past Surgical History:   Procedure Laterality Date    ANASTOMOSIS OF INTESTINE N/A 2/6/2024    Procedure: ANASTOMOSIS, INTESTINE - Ileocolic anastomosis;  Surgeon: Hammad Reynolds MD;  Location: [facility] OR 2ND FLR;  Service: Colon and Rectal;  Laterality: N/A;    ANTEGRADE NEPHROSTOGRAPHY Bilateral 9/28/2020    Procedure: Nephrostogram - antegrade;  Surgeon: Leslie Balbuena MD;  Location: [facility] OR 1ST FLR;  Service: Urology;  Laterality: Bilateral;    ANTEGRADE NEPHROSTOGRAPHY Left 4/20/2021    Procedure: NEPHROSTOGRAM, ANTEGRADE;  Surgeon: Leslie Balbuena MD;  Location: [facility] OR 1ST FLR;  Service: Urology;  Laterality: Left;    ANTEGRADE NEPHROSTOGRAPHY Right 10/27/2022    Procedure: NEPHROSTOGRAM, ANTEGRADE;  Surgeon: Chirag Russ MD;  Location: [facility] OR 1ST FLR;  Service: Urology;  Laterality: Right;    ANTEGRADE NEPHROSTOGRAPHY Right 4/21/2023    Procedure: NEPHROSTOGRAM, ANTEGRADE;  Surgeon: Chirag Russ MD;  Location: [facility] OR 2ND FLR;  Service: Urology;  Laterality: Right;    ANTEGRADE NEPHROSTOGRAPHY Left 6/6/2023    Procedure: Nephrostogram - antegrade;  Surgeon: Leslie Balbuena MD;  Location: [facility] OR 1ST FLR;  Service: Urology;  Laterality: Left;    BILATERAL OOPHORECTOMY  2015    CHOLECYSTECTOMY  11/09/2016    Done at [facility], path showed chronic cholecystitis and gallstones    cold knife conization  2014    COLECTOMY, RIGHT  9/17/2020    Procedure: COLECTOMY, RIGHT Extended;  Surgeon: Hammad Reynolds MD;  Location: [facility] OR 2ND FLR;  Service: Colon and Rectal;;    COLONOSCOPY  2014    COLONOSCOPY N/A 10/24/2016    at [facility], Dr Thomas    COLONOSCOPY N/A 5/18/2018    Procedure: COLONOSCOPY;  Surgeon: Arden Gutiérrez MD;  Location: [facility] ENDO (4TH FLR);  Service: Endoscopy;  Laterality: N/A;    COLONOSCOPY N/A 7/28/2020    Procedure: COLONOSCOPY;  Surgeon: Hammad Reynolds MD;   Location: [facility] ENDO (4TH FLR);  Service: Colon and Rectal;  Laterality: N/A;  covid test [place] 7/25    CYSTOSCOPY WITH URETEROSCOPY, RETROGRADE PYELOGRAPHY, AND INSERTION OF STENT Bilateral 3/21/2020    Procedure: CYSTOSCOPY, WITH RETROGRADE PYELOGRAM,;  Surgeon: Leslie Balbuena MD;  Location: [facility] OR 1ST FLR;  Service: Urology;  Laterality: Bilateral;    DILATION OF NEPHROSTOMY TRACT Right 10/27/2022    Procedure: DILATION, NEPHROSTOMY TRACT;  Surgeon: Chirag Russ MD;  Location: [facility] OR 1ST FLR;  Service: Urology;  Laterality: Right;    ESOPHAGOGASTRODUODENOSCOPY  2014    ESOPHAGOGASTRODUODENOSCOPY  11/18/2020    ESOPHAGOGASTRODUODENOSCOPY N/A 11/18/2020    Procedure: ESOPHAGOGASTRODUODENOSCOPY (EGD);  Surgeon: Zenon Spencer MD;  Location: [facility];  Service: Endoscopy;  Laterality: N/A;    ESOPHAGOGASTRODUODENOSCOPY N/A 12/11/2020    Procedure: EGD (ESOPHAGOGASTRODUODENOSCOPY);  Surgeon: Juancho Muse MD;  Location: [facility] (2ND FLR);  Service: Endoscopy;  Laterality: N/A;    EXCISION, SMALL INTESTINE  2/6/2024    Procedure: EXCISION, SMALL INTESTINE;  Surgeon: Hammad Reynolds MD;  Location: [facility] OR 2ND FLR;  Service: Colon and Rectal;;    HYSTERECTOMY  2014    [facility] for cervical cancer    ILEOSTOMY  9/17/2020    Procedure: CREATION, ILEOSTOMY;  Surgeon: Hammad Reynolds MD;  Location: [facility] OR 2ND FLR;  Service: Colon and Rectal;;    ILEOSTOMY CLOSURE N/A 2/6/2024    Procedure: CLOSURE, ILEOSTOMY;  Surgeon: Hammad Reynolds MD;  Location: [facility] OR 2ND FLR;  Service: Colon and Rectal;  Laterality: N/A;    LAPAROTOMY, EXPLORATORY N/A 2/6/2024    Procedure: OPEN LAPAROTOMY, EXPLORATORY;  Surgeon: Leslie Balbuena MD;  Location: [facility] OR 2ND FLR;  Service: Urology;  Laterality: N/A;  22 modifier    LOOPOGRAM N/A 4/20/2021    Procedure: LOOPOGRAM;  Surgeon: Leslie Balbuena MD;  [facility];  Service: Urology;  Laterality: N/A;    LOOPOGRAM N/A 6/6/2023    Procedure: LOOPOGRAM;   Surgeon: Leslie Balbuena MD;  Location: [facility] OR 1ST FLR;  Service: Urology;  Laterality: N/A;    LYSIS OF ADHESIONS  2/6/2024    Procedure: LYSIS, ADHESIONS;  Surgeon: Leslie Balbuena MD;  Location: [facility] OR 2ND FLR;  Service: Urology;;    LYSIS OF ADHESIONS  2/6/2024    Procedure: LYSIS, ADHESIONS;  Surgeon: Hammad Reynolds MD;  Location: [facility] OR [facility] FLR;  Service: Colon and Rectal;;    MOBILIZATION OF SPLENIC FLEXURE N/A 9/11/2020    Procedure: MOBILIZATION, COLONIC;  Surgeon: Hammad Reynolds MD;  Location: [facility] OR [facility] FLR;  Service: Colon and Rectal;  Laterality: N/A;    NEPHROSTOGRAPHY Bilateral 4/17/2021    Procedure: Nephrostogram;  Surgeon: Celeste Surgeon;  Location: [facility];  Service: Anesthesiology;  Laterality: Bilateral;    OOPHORECTOMY      PERCUTANEOUS NEPHROLITHOTOMY Right 4/21/2023    Procedure: NEPHROLITHOTOMY, PERCUTANEOUS;  Surgeon: Chirag Russ MD;  [facility];  Service: Urology;  Laterality: Right;  2.5 hrs    PERCUTANEOUS NEPHROSTOMY  4/21/2023    Procedure: CREATION, NEPHROSTOMY, PERCUTANEOUS with removal of existing nephrostomy tube;  Surgeon: Chirag Russ MD;  [facility];  Service: Urology;;    PYELOSCOPY Right 10/27/2022    Procedure: PYELOSCOPY;  Surgeon: Chirag Russ MD;  [facility];  Service: Urology;  Laterality: Right;    REPLACEMENT OF NEPHROSTOMY TUBE Right 10/27/2022    Procedure: REPLACEMENT, NEPHROSTOMY TUBE;  Surgeon: Chirag Russ MD;  [facility];  Service: Urology;  Laterality: Right;    RETROPERITONEAL LYMPHADENECTOMY Right 9/17/2018    Procedure: LYMPHADENECTOMY, RETROPERITONEUM;  Surgeon: Sebas Reed MD;  [facility];  Service: General;  Laterality: Right;  ILIAC    REVISION OF ANASTOMOSIS OF URETER TO ILEAL CONDUIT N/A 2/6/2024    Procedure: REVISION, ANASTOMOSIS, URETEROILEAL;  Surgeon: Leslie Balbuena MD;  Location: [facility] OR 2ND FLR;  Service: Urology;  Laterality:  N/A;    URETEROSCOPIC REMOVAL OF URETERIC CALCULUS Right 10/27/2022    Procedure: REMOVAL, CALCULUS, URETER, URETEROSCOPIC;  Surgeon: Chirag Russ MD;  Location: [facility] OR [address] [place] [address];  Service: Urology;  Laterality: Right;    URETEROSCOPY Right 10/27/2022    Procedure: URETEROSCOPY-ANTEGRADE;  Surgeon: Chirag Russ MD;  Location: [facility] OR [address] [place] [address];  Service: Urology;  Laterality: Right;       Allergies:   Review of patient's allergies indicates:   Allergen Reactions    Bee sting [allergen ext-venom-honey bee]      Rash      Grass pollen-bermuda, standard      rash        Home Meds:   Prior to Admission medications    Medication Sig Start Date End Date Taking? Authorizing Provider   acetaminophen (TYLENOL) 500 MG tablet Take 1 tablet (500 mg total) by mouth every 6 (six) hours as needed for Pain. 2/18/24   Dave Tatum MD   amLODIPine (NORVASC) 5 MG tablet Take 1 tablet (5 mg total) by mouth once daily. 8/13/24 8/13/25  Brittney Bolton MD   ampicillin (PRINCIPEN) 500 MG capsule Take 1 capsule (500 mg total) by mouth every 6 (six) hours. 2/24/25   Leslie Balbuena MD   ferrous sulfate (FEOSOL) 325 mg (65 mg iron) Tab tablet TAKE 1 TABLET BY MOUTH TWICE A DAY 3/12/24   Leslie Balbuena MD   loratadine (CLARITIN) 10 mg tablet Take 10 mg by mouth daily as needed for Allergies. 9/5/23   Provider, Historical   mirtazapine (REMERON) 30 MG tablet Take 1 tablet (30 mg total) by mouth nightly. 11/6/23 2/25/25  Andriy Coley MD   oxyCODONE-acetaminophen (PERCOCET) 5-325 mg per tablet Take 1 tablet by mouth every 8 (eight) hours as needed for Pain.    Provider, Historical   pantoprazole (PROTONIX) 40 MG tablet Take 1 tablet (40 mg total) by mouth once daily. for 14 days  Patient not taking: Reported on 2/18/2025 8/13/24 9/23/24  Brittney Bolton MD       Anticoagulation/Antiplatelet Meds: as above    Review of Systems:   Hematological: no known coagulopathies  Respiratory: no shortness of breath  Cardiovascular:  no chest pain  Gastrointestinal: no abdominal pain  Genitourinary: no dysuria  Musculoskeletal: negative  Neurological: no TIA or stroke symptoms     Physical Exam:       General: WNWD, NAD  HEENT: Normocephalic, sclera anicteric, oropharynx clear  Neck: Supple, no palpable lymphadenopathy  Heart: RRR  Lungs:  breathing unlabored  Abd: NTND, soft; right PCN tube in place  Extremities:  no CCE on exposed skin  Neuro: Gross nonfocal    Laboratory:  Lab Results   Component Value Date    INR 1.0 08/07/2024       Lab Results   Component Value Date    WBC 5.69 12/28/2024    HGB 12.0 12/28/2024    HCT 40.4 12/28/2024    MCV 92 12/28/2024     12/28/2024      Lab Results   Component Value Date    GLU 88 05/05/2025     05/05/2025    K 4.5 05/05/2025     05/05/2025    CO2 27 05/05/2025    BUN 19 05/05/2025    CREATININE 1.2 05/05/2025    CALCIUM 9.3 05/05/2025    MG 1.8 12/27/2024    ALT 15 05/05/2025    AST 18 05/05/2025    ALBUMIN 3.3 (L) 05/05/2025    BILITOT 0.3 05/05/2025    BILIDIR 0.2 12/04/2020       Imaging:  Prior exchange  was reviewed.    Assessment/Plan:  60 y.o. female with h/o ureteral obstruction and indwelling PCN. Will undergo PCN exchange today.    Sedation:  Sedation history: have not been any systemic reactions  ASA: 2 / Mallampati: 3  Sedation plan: Up to moderate (Versed, fentanyl)     Risks (including, but not limited to, pain, bleeding, infection, damage to nearby structures, treatment failure/recurrence, and the need for additional procedures), potential benefits, and alternatives were discussed with the patient. All questions were answered to the best of my abilities. The patient wishes to proceed. Written informed consent was obtained.      Susan Cueva MD

## 2025-05-06 NOTE — DISCHARGE SUMMARY
Radiology Discharge Summary      Hospital Course: No complications    Admit Date: 5/6/2025  Discharge Date: 05/06/2025     Instructions Given to Patient: Yes  Diet: Resume prior diet  Activity: activity as tolerated    Description of Condition on Discharge: Stable  Vital Signs (Most Recent): Temp: 97.9 °F (36.6 °C) (05/06/25 0926)  Pulse: 70 (05/06/25 0926)  Resp: 16 (05/06/25 0926)  BP: 114/61 (05/06/25 0926)  SpO2: 100 % (05/06/25 0926)    Discharge Disposition: Home    Discharge Diagnosis: ureteral obstruction    Follow up: As scheduled    Susan Cueva MD  Interventional Radiology

## 2025-05-06 NOTE — BRIEF OP NOTE
Radiology Post-Procedure Note    Pre Op Diagnosis: ureteral obstruction    Post Op Diagnosis: same    Procedure: pcn exchange     Procedure performed by: Susan Cueva MD    Written Informed Consent Obtained: Yes    Estimated Blood Loss: Minimal    Findings:   Right 10 Fr PCN exchange    Patient tolerated procedure well.    Susan Cueav MD  Interventional Radiology

## 2025-05-06 NOTE — PLAN OF CARE
Pt in preop bay 40, VSS, meds given and IV inserted. Pt denies any open wounds on body or the use of any weight loss injections. Pt needs procedural consents and an H&P, otherwise ready to roll.

## 2025-05-06 NOTE — Clinical Note
Right: Back.   Scrubbed with Chlorohexidine.   Painted with Chlorohexidine.  Skin prep dry before draping.

## 2025-05-06 NOTE — DISCHARGE INSTRUCTIONS
Nephrostomy Tube Home Care     What is a nephrostomy tube?   Kidney stones, kidney infection, trauma, or other reasons can cause problems with urine leaving the body normally. When this happens, your provider may recommend a nephrostomy tube to drain urine from your kidney to a bag outside your body. A nephrostomy tube is a small, flexible tube that goes through the skin directly into the kidney. You may have one tube or two depending on if one or both kidneys are affected.     Nephrostomy tubes help drain the urine from the body in two ways:   1. PCN: An external drain allows urine to drain outside of your body into a collection bag.   2. PCNU: Allows urine to drain into the bladder as well as outside your body into a collection bag.     Follow Up:  You should be following up with your referring provider/primary care provider while the tube is in place. The tube will stay in for at least 4 weeks, unless surgery is planned to correct your kidney problem. Generally, the tube(s) are replaced every 8-12 weeks. If you do not have an appointment and feel it is time for the tube to be replaced, please call [phone].     Will my diet or daily activity be limited by my nephrostomy tube?    Continue with your regular diet and prescribed medications, unless otherwise specified on your discharge instructions. For general questions about your diet and medication, contact your primary care physician.    Avoid any activity that may result in pulling on the drain.    Do not submerge the drain. This means do not swim, sit in a hot tub, soak in a tub bath, or do anything that involves putting the drain under water.     How do I take care of my nephrostomy tube?   Empty the collection bag into the toilet when it is half full, or at least once each day. Your provider will let you know if you should keep track of the amount.   1. Turn the knob at the bottom of the bag and drain into toilet. If your provider has told you to track  the contents, empty the bag into a measuring container and record the amount.   2. Wash your hands.   3. Change outside tube and bag weekly. These can be bought from any medical supplies store.     Can I shower?   While it is okay to shower with your tube(s), do not soak in any water with your tube(s). Soaking may lead to infection.    First 2 weeks: Before taking a shower, cover the dressing with a double layer of plastic wrap (like Saran wrap) where it enters the skin. Tape down the edges. After the shower, remove the plastic and apply a clean bandage.    After 2 weeks: You may shower without the plastic wrap. Rinse well. Pat the area dry and apply a clean bandage.     Do I need to change the bandage?   Change your dressing at least every 2 days, or if it becomes dirty or wet.   1. Wash your hands.   2. Remove the old bandage carefully. Try not to pull on the drain or stitches.   3. If the skin needs to be cleaned, use a gauze or cotton swab to gently clean it with soap and water.   4. Wait for your skin to dry.   5. Apply a sterile 4x4 gauze over the tube where it enters your body. Be careful not to kink the drain.   6. Secure with paper tape.   7. Wash your hands.     Call your provider immediately or seek emergency medical attention if you experience any of the following symptoms:    Fever/chills    New or worsening belly pain    Nausea or vomiting    New or worsening redness or swelling where the drain meets the skin    Pus leaking around the drain    Abrupt change in amount of urine in the bag or when you urinate    The drain begins to leak, breaks, or falls out     Troubleshooting:  If the tube falls out   1. Cover the hole in the skin with gauze pads and tape.   2. Call the Interventional Radiology Clinic for instructions on how to get the tube replaced.   Interventional Radiology Clinic   For complications   [phone]. Monday - Friday, 8:00 am - 4:00 pm    [phone] After hours and on holidays.  Ask to speak with the interventional radiologist on call.     For Scheduling   [phone] Monday - Friday, 8:00 am - 4:00 pm       Flushing Drains:  If you are told to flush drains, you will need to flush each of your urinary drains with 10 cc of saline each day.   1. Gather supplies: 10 mL of sterile normal saline solution, syringe, sterile gauze, paper tape, and rubbing alcohol (liquid or single-use alcohol wipes).   2. Wash your hands.   3. Unscrew the drainage bag from drain (place a towel beneath to catch any drips).   4. Wipe drain with alcohol.   5. Fill the syringe with 10 mL of normal saline solution.   6. Attach syringe to drain.   7. Gently inject normal saline into drain. Do NOT pull back on the syringe.   8. Unscrew syringe.   9. Reattach drainage bag.   10. Wash your hands and dispose of supplies.     Capping Drains:  If you are told to cap your drain You should cap the drain 24 hours after the procedure. To cap drain follow these instructions:  Turn stop cock to off position.  Unscrew bag from drain, while leaving stop cock in position.  Keep drainage bag in case it is needed in the future.    If you experience any of the following symptoms, try uncapping the drain and attaching the drainage bag. If this does not improve your symptoms, call your provider immediately or seek emergency medical attention.    New or worsening yellowing of your skin/eyes    Fever    New or worsening belly pain

## 2025-05-09 ENCOUNTER — TELEPHONE (OUTPATIENT)
Dept: UROLOGY | Facility: CLINIC | Age: 60
End: 2025-05-09
Payer: MEDICAID

## 2025-05-13 ENCOUNTER — TELEPHONE (OUTPATIENT)
Dept: UROLOGY | Facility: CLINIC | Age: 60
End: 2025-05-13
Payer: MEDICAID

## 2025-05-13 DIAGNOSIS — Z93.6 S/P ILEAL CONDUIT: Primary | ICD-10-CM

## 2025-05-13 DIAGNOSIS — N20.1 URETERAL CALCULUS: ICD-10-CM

## 2025-05-13 NOTE — TELEPHONE ENCOUNTER
Spoke with patient with surgery date 05/27 , will send preop instructions in mail also reveiwed over the phone.

## 2025-05-15 ENCOUNTER — TELEPHONE (OUTPATIENT)
Dept: UROLOGY | Facility: CLINIC | Age: 60
End: 2025-05-15
Payer: MEDICAID

## 2025-05-15 NOTE — PRE-PROCEDURE INSTRUCTIONS
Sent to patient via [employer] message--      PreOp and Medication Instructions given:   - medication instructions (instructed to hold vitamins, herbal supplements and NSAIDS one week prior to surgery)  - NPO guidelines, unless otherwise instructed by Surgeon's Office  - Arrival place and time to be given by the Surgeon's Office   - Shower with antibacterial soap   - No makeup or moisturizer to face   - No perfume/cologne, powder, lotions, deodorant or aftershave   - Leave all jewelry, including body piercings, and valuables at home.  - Arrange for someone to drive you home following surgery; will not be allowed to leave the surgical facility alone or drive self home following sedation and anesthesia.    Pt instructed to call surgeon's office or POC with any questions or changes.

## 2025-05-15 NOTE — ANESTHESIA PAT ROS NOTE
2025  Edita Riley is a 60 y.o. Jehovah Witness, female with S/P ILEAL CONDUIT; URETERAL CALCULUS, arrives for optimization and preop anesthesia assessment    ALERT---Patient is agreeable to cellsaver and albumin products. Pt refuses whole blood products.        Pre-op Assessment    I have reviewed the Patient Summary Reports.     I have reviewed the Nursing Notes. I have reviewed the NPO Status.   I have reviewed the Medications.     Review of Systems  Anesthesia Hx:    Hx of difficult intubation  Complicating Factors:  None, anterior larynx, small mouth, narrow palate, large/floppy epiglottis, short neck and poor neck/head extension;     2021  Hard to intubate    Difficult intravenous access       History of prior surgery of interest to airway management or planning:  Previous anesthesia: General 24 REVISION, ANASTOMOSIS, URETEROILEAL OPEN LAPAROTOMY, EXPLORATORY LYSIS, ADHESIONS with general anesthesia.       Airway issues documented on chart review include videolaryngoscope used, difficult direct laryngoscopy     Denies Family Hx of Anesthesia complications.    Denies Personal Hx of Anesthesia complications.                    Social:  Non-Smoker, No Alcohol Use  Socioeconomic History   Not Employed  Marital Status    Spouse Name  Hammad  Number of Children  2  Smoking  Never  Smokeless Tobacco  Never  Alcohol  0.0 standard drinks of alcohol/week         Family History   Mother ( at age 31)               Cancer    Cervical cancer  Father ( at age 65) Colon cancer   Esophageal cancer   Cancer   Sister ( at age 61)              Heart disease  Maternal Grandmother ( at age 75) Heart disease    Daughter ( at age 18)   Suicide      Drug abuse  Daughter  Drug abuse  Maternal Aunt ()                Breast cancer           Hematology/Oncology:       -- Anemia:               Hematology Comments: folate deficiency    Refusal of blood transfusions as patient is Nondenominational    Neuropathy due to chemotherapeutic drug                       Oncology Comments: h/o cervical cancer s/p TVH and XRT, h/o metastatic SCC to LN,     11/14/2018  Neuropathy due to chemotherapeutic drug    10/11/2018  Metastatic squamous cell carcinoma to lymph node    2014  Cervical cancer        EENT/Dental:  EENT/Dental Normal           Cardiovascular:     Hypertension       Denies Angina.       Denies MUNGUIA.   h/o QT prolongation,   Functional Capacity 4 METS        Deep Venous Thrombosis (DVT), Hx of DVT, left lower extremity 11/04/2020  DVT of lower extremity, bilateral                   Pulmonary:   COPD, mild      Emphysema,                         Renal/:  Chronic Renal Disease, ARF, CKD renal calculi   Right ureteral calculus, right hydronephrosis, h/o nephrostomy, h/o urostomy, h/o colon conduit for ureteral stenosis      Radiation cystitis      02/06/24 REVISION, ANASTOMOSIS, URETEROILEAL OPEN LAPAROTOMY, EXPLORATORY LYSIS, ADHESIONS (Abdomen) CLOSURE, ILEOSTOMY ANASTOMOSIS, INTESTINE - Ileocolic anastomosis (Abdomen) EXCISION, SMALL INTESTINE (Abdomen) LYSIS, ADHESIONS, general anesthesia, ASA 3      2/6/2024  Revision of anastomosis of ureter to ileal conduit (N/A)   2/6/2024  Laparotomy, exploratory (N/A)   2/6/2024  Lysis of adhesions   2/6/2024  Ileostomy closure (N/A)   2/6/2024  Anastomosis of intestine (N/A)   2/6/2024  Excision, small intestine   2/6/2024  Lysis of adhesions   10/26/2023  Nephrostomy complication  6/6/2023  Antegrade nephrostography (Left)   6/6/2023  Loopogram (N/A)   4/21/2023  Percutaneous nephrolithotomy (Right)   4/21/2023  Antegrade nephrostography (Right)   4/21/2023  Percutaneous nephrostomy   10/27/2022  Ureteroscopy (Right)   10/27/2022  Pyeloscopy (Right)   10/27/2022  Replacement of nephrostomy tube (Right)    10/27/2022  Dilation of nephrostomy tract (Right)   4/20/2021  Loopogram (N/A)   4/20/2021  Antegrade nephrostography (Left)   2/6/24 09/27/2020  Fungemia  09/22/2020  Peritonitis  09/11/2020  Bilateral ureteral obstruction      Schatzki's ring w/ h/o dilation Renal Symptoms/Infections/Stones:   Urolithiasis Neoplasm/Tumor.   Kidney Function/Disease, Chronic Kidney Disease (CKD) , CKD Stage III (GFR 30-59)            Hepatic/GI:    Hiatal Hernia, GERD, well controlled Liver Disease,  h/o colon conduit for ureteral stenosis 2/2 radiation therapy c/b colon perforation requiring diverting colostomy (later reversed along with repair of scarring,    Lactose intolerance      5/17/2025  CT ABDOMEN PELVIS WITHOUT CONTRAST     CLINICAL HISTORY:  Abdominal trauma, blunt;       COMPARISON:  CT of the abdomen and pelvis performed 12/24/2024     FINDINGS:  This examination is limited due to lack of intravenous contrast.     Lower chest: Atelectasis at the lung bases.     Liver: Normal contour.  Findings in keeping with hepatic steatosis.     Gallbladder and bile ducts: Status post cholecystectomy.     Pancreas: Normal contour.     Spleen: Normal contour.     Adrenals: Normal contour.     Kidneys:     Status post placement of right posterior approach percutaneous nephrostomy tube.  Pigtail loop appears formed at the level of lower pole calices.  Punctate nonobstructing mid lower pole caliceal stones suggested.  Mildly prominent caliber of the right renal collecting system and proximal to mid ureter, with stranding in the adjoining fat planes, similar configuration to 12/24/2024 CT.     9-10 mm nonobstructing calculus within a left lower pole calyx.  Suggested simple cyst at the left lower pole.     Cortical atrophy of both kidneys with nonspecific bilateral perinephric stranding.  Findings may be seen in the setting of medical renal disease.     Lymph nodes: Mildly prominent number, but small mesenteric and retroperitoneal  lymph nodes are nonspecific, and may be reactive in etiology.     Bowel and mesentery: Redemonstrated operative sequela of bowel resection, ileal conduit formation, and left anterior abdominal wall urostomy.  No evidence of bowel obstruction at the present time.     Abdominal aorta: Unremarkable.     Inferior vena cava: Unremarkable.     Free fluid or free air: None.     Pelvis: Unremarkable.     Urinary bladder: Unremarkable.     Body wall: As above.  Rectus diastasis with small fat containing ventral hernia.     Bones: No acute findings.     Impression:     1. Status post placement of right posterior approach percutaneous nephrostomy tube.  Pigtail loop appears formed at the level of lower pole calices.  2. Nonobstructing renal calculi, left larger than right.  3. Mildly prominent caliber of the right renal collecting system and proximal to mid ureter, with stranding in the adjoining fat planes, similar configuration to 12/24/2024 CT.  Findings may be postoperative in etiology related to ileal conduit formation and/or reactive to prior inflammatory process, noting that acute urinary tract infection would be difficult to entirely exclude, in the appropriate clinical context.  Correlation advised in this regard.  4. Additional details of chronic and incidental findings, as provided in the body of the report.        Electronically signed by:Fermín Lin  Date:                                            05/17/2025 9/28/2020  Antegrade nephrostography (Bilateral)   9/17/2020  Colectomy, right   09/22/2020  Peritonitis  9/17/2020  Ileostomy   9/11/2020  Mobilization of splenic   flexure (N/A)   7/28/2020  Colonoscopy (N/A)     3/21/2020  Cystoscopy with ureteroscopy, retrograde pyelography, and insertion of stent (Bilateral)   9/17/2018  Retroperitoneal lymphadenectomy (Right)  11/14/2018  Diarrhea due to malabsorption  11/09/2016  S/P CHOLECYSTECTOMY      Bowel Conditions:  s/p surgical resection Hernia,  Hiatal Hernia   Liver Disease, Fatty Liver        Musculoskeletal:  Arthritis    Musculoskeletal General/Symptoms: muscle pain, generalzed, low back pain.     Muscle Disorders: Fibromyalgia  Joint Disease:  Arthritis, Osteoarthritis     Spine Disorders: lumbar Disc disease, Degenerative disease and Chronic Pain           OB/GYN/PEDS:  h/o cervical cancer s/p TVH and XRT, h/o metastatic SCC to LN,    9/17/2018  Retroperitoneal lymphadenectomy (Right Iliac)        2015  bilateral oophorectomy     2014  Hysterectomy   2014  cold knife conization      2014  Cervical cancer              Neurological:   CVA Neuromuscular Disease,  Headaches Denies Seizures.     Neuro Symptoms of pain Dx of Headaches  Pain Syndrome   Pain Etiology/Diagnosis, Osteoarthritis, Carpal Tunnel Syndrome  Peripheral Neuropathy     Seizure Disorder      hemifacial spasm,    12/02/2018  Seizure-like activity with a mixup of meds          CVA - Cerebrovasular Accident , Most recent CVA was on @ age 10  , residual deficits are facial paralysis - left.            Neuromuscular Disease   Endocrine:  Denies Diabetes. Denies Hypothyroidism.  Denies Hyperthyroidism. Folate deficiency    Vitamin D deficiency           Denies Morbid Obesity / BMI > 40  Psych:  Psychiatric History anxiety depression PTSD  H/O SUICIDAL IDEATION    Sleep stage dysfunction                  Physical Exam  General: Well nourished, Cooperative, Alert and Oriented    Airway:  Mallampati: III   Tongue: Normal  Neck ROM: Normal ROM    Dental:  Intact    Chest/Lungs:  Clear to auscultation, Normal Respiratory Rate    Heart:  Rate: Normal  Rhythm: Regular Rhythm  Sounds: Normal          Anesthesia Assessment: Preoperative EQUATION    Planned Procedure: Procedure(s) (LRB):  NEPHROLITHOTOMY, PERCUTANEOUS (N/A)  Requested Anesthesia Type:General  Surgeon: Leslie Balbuena MD  Service: Urology  Known or anticipated Date of Surgery:7/8/2025    Surgeon notes: reviewed    Electronic  QUestionnaire Assessment completed via nurse interview with patient.        Triage considerations:     The patient has no apparent active cardiac condition (No unstable coronary Syndrome such as severe unstable angina or recent [<1 month] myocardial infarction, decompensated CHF, severe valvular   disease or significant arrhythmia)    Previous anesthesia records:GETA, No problems, and Hx of difficult intubation  02/06/24 REVISION, ANASTOMOSIS, URETEROILEAL OPEN LAPAROTOMY, EXPLORATORY LYSIS, ADHESIONS (Abdomen) CLOSURE, ILEOSTOMY ANASTOMOSIS, INTESTINE - Ileocolic anastomosis (Abdomen) EXCISION, SMALL INTESTINE (Abdomen) LYSIS, ADHESIONS, general anesthesia, ASA 3  Intubation     Date/Time: 2/6/2024 7:21 AM     Performed by: Andrew Fisher MD  Authorized by: Sanford Garcia MD    Intubation:     Induction:  Intravenous    Intubated:  Postinduction    Mask Ventilation:  Easy with oral airway    Attempts:  1    Attempted By:  Resident anesthesiologist    Method of Intubation:  Video laryngoscopy    Blade:  Martinez 3    Laryngeal View Grade: Grade IIA - cords partially seen      Difficult Airway Encountered?: No      Complications:  None    Airway Device:  Oral endotracheal tube    Airway Device Size:  7.0    Style/Cuff Inflation:  Cuffed (inflated to minimal occlusive pressure)    Inflation Amount (mL):  8    Tube secured:  21    Secured at:  The lips    Placement Verified By:  Capnometry    Complicating Factors:  None, anterior larynx, small mouth, narrow palate, large/floppy epiglottis, short neck and poor neck/head extension    Findings Post-Intubation:  BS equal bilateral and atraumatic/condition of teeth unchanged    Last PCP note: 6-12 months ago , within [facility]   Subspecialty notes: Urology    Other important co-morbidities: GERD, HTN, JENN, and right ureteral calculus, right hydronephrosis, h/o nephrostomy, h/o urostomy, h/o colon conduit for ureteral stenosis 2/2 radiation therapy c/b colon perforation  requiring diverting colostomy (later reversed along with repair of scarring, h/o cervical cancer s/p TVH and XRT, h/o metastatic SCC to LN, h/o DVT LE, anemia, AR, ODESSA, CKD 3a, anxiety, depression, h/o psychiatric care, h/o suicidal ideation, neuropathy, h/o QT prolongation, migraine, h/o seizures and seizure like activity, h/o stroke, hemifacial spasm, hiatal hernia, Schatzki's ring w/ h/o dilation, emphysema, OA back, folate deficiency, colon polyp, bilateral ureteral obstruction, chronic back pain, h/o difficult intubation, fibromyalgia, h/o nena, NO BLOOD-JW, h/o bilateral oophorectomy       Tests already available:  Available tests,  within 3 months , within [facility] .   05/05/25 CMP            Instructions     Optimization:  Anesthesia Preop Clinic Assessment Indicated    Medical Opinion Indicated       Sub-specialist consult indicated: TBD       Plan:    Testing:  CBC, EKG, and T&S   Pre-anesthesia  visit       Visit focus: concerns in complex and/or prolonged anesthesia, airway concerns, post-operative pain control for chronic pain patient, acute or chronic anxiety concerns, patient who requires bloodless surgery (Jehovah Witness), Amanda 3/ASA 3, last anes over a year ago, h/o difficult intubation     Consultation:IM Perioperative Hospitalist    Navigation: Tests Scheduled.              Consults scheduled.             Results will be tracked by Preop Clinic.       7/1/2025 INTERNAL MEDICINE  Patient is optimized     Patient/ care giver/ Family member was instructed to call and update me about any changes to health,  medication, office visits ,testing out side of the [facility] , hospitalizations between now and surgery       Sofie Driver MD  Internal Medicine  [facility]   Cell Phone- [phone]

## 2025-05-17 ENCOUNTER — HOSPITAL ENCOUNTER (EMERGENCY)
Facility: HOSPITAL | Age: 60
Discharge: HOME | End: 2025-05-17
Attending: EMERGENCY MEDICINE

## 2025-05-17 VITALS
BODY MASS INDEX: 28.88 KG/M2 | OXYGEN SATURATION: 100 % | DIASTOLIC BLOOD PRESSURE: 70 MMHG | TEMPERATURE: 99 F | WEIGHT: 195 LBS | SYSTOLIC BLOOD PRESSURE: 147 MMHG | HEIGHT: 69 IN | HEART RATE: 75 BPM | RESPIRATION RATE: 13 BRPM

## 2025-05-17 DIAGNOSIS — T83.022A NEPHROSTOMY TUBE DISPLACED: Primary | ICD-10-CM

## 2025-05-17 PROCEDURE — 63600175 PHARM REV CODE 636 W HCPCS: Performed by: RADIOLOGY

## 2025-05-17 PROCEDURE — 25500020 PHARM REV CODE 255: Performed by: EMERGENCY MEDICINE

## 2025-05-17 PROCEDURE — 96374 THER/PROPH/DIAG INJ IV PUSH: CPT

## 2025-05-17 PROCEDURE — 99285 EMERGENCY DEPT VISIT HI MDM: CPT | Mod: 25

## 2025-05-17 PROCEDURE — 50435 EXCHANGE NEPHROSTOMY CATH: CPT

## 2025-05-17 PROCEDURE — 25000003 PHARM REV CODE 250: Performed by: RADIOLOGY

## 2025-05-17 PROCEDURE — 96375 TX/PRO/DX INJ NEW DRUG ADDON: CPT

## 2025-05-17 RX ORDER — LIDOCAINE HYDROCHLORIDE 10 MG/ML
INJECTION, SOLUTION INFILTRATION; PERINEURAL
Status: COMPLETED | OUTPATIENT
Start: 2025-05-17 | End: 2025-05-17

## 2025-05-17 RX ORDER — FENTANYL CITRATE 50 UG/ML
INJECTION, SOLUTION INTRAMUSCULAR; INTRAVENOUS
Status: COMPLETED | OUTPATIENT
Start: 2025-05-17 | End: 2025-05-17

## 2025-05-17 RX ORDER — DIPHENHYDRAMINE HYDROCHLORIDE 50 MG/ML
INJECTION, SOLUTION INTRAMUSCULAR; INTRAVENOUS
Status: COMPLETED | OUTPATIENT
Start: 2025-05-17 | End: 2025-05-17

## 2025-05-17 RX ADMIN — IOHEXOL 10 ML: 300 INJECTION, SOLUTION INTRAVENOUS at 02:05

## 2025-05-17 RX ADMIN — LIDOCAINE HYDROCHLORIDE 5 ML: 10 INJECTION, SOLUTION INFILTRATION; PERINEURAL at 02:05

## 2025-05-17 RX ADMIN — CEFTRIAXONE SODIUM 1 G: 500 INJECTION, POWDER, FOR SOLUTION INTRAMUSCULAR; INTRAVENOUS at 02:05

## 2025-05-17 RX ADMIN — FENTANYL CITRATE 50 MCG: 50 INJECTION, SOLUTION INTRAMUSCULAR; INTRAVENOUS at 02:05

## 2025-05-17 RX ADMIN — DIPHENHYDRAMINE HYDROCHLORIDE 25 MG: 50 INJECTION, SOLUTION INTRAMUSCULAR; INTRAVENOUS at 02:05

## 2025-05-17 NOTE — PLAN OF CARE
R nephrostomy tube change complete. Pt tolerated well. VSS. No signs or symptoms of distress noted.  Site CDI.  Pt will be transferred back to ED [facility] 13.

## 2025-05-17 NOTE — HPI
Edita Riley is a 60 year old women who has a history of pelvic irradiation with ureteral strictures bilaterally.  History of colo-colonic anastomotic leak and peritonitis following colonic urinary conduit creation, s/p extended right colectomy with end ileostomy and left ureter-colonic conduit anastomotic stricture s/p ureteral re-implant to a transverse colon conduit on 2/6/2024, lysis of adhesions, takedown of ileostomy with ileosigmoid anastamosis. Right hydronephrosis has been managed by nephrostomy tubes. The patient was scheduled for an antegrade ureteroscopy to clear debris from right ureter.    She presents to the ED today due to dislodging right nephrostomy tube on door handle. On assessment the nephrostomy tube irrigated poorly. She denies a recent history of fevers, chills, nausea and vomiting    CT Abdomen and Pelvis shows right right nephrostomy tube has been pulled into a calyx. No residual hydronephrosis bilaterally.

## 2025-05-17 NOTE — H&P
Radiology History & Physical      SUBJECTIVE:     Chief Complaint: R PCN malpositioning    History of Present Illness:  Edita Riley is a 60 y.o. female who presents with R PCN retraction into a calyx. IR consulted for exchange and repositioning.     Past Medical History:   Diagnosis Date    Abnormal mammogram 08/25/2020    Anemia due to chronic blood loss     Anxiety     Bilateral ureteral obstruction 09/11/2020    obstructive uropathy now s/p transverse colon conduit, s/p right  Nephrostomy tube, s/p Ileostomy,    Cervical cancer 2014    s/p XRT & Bilateral Ureteral strictures with obstructive uropathy    Chronic back pain     CKD (chronic kidney disease)     Colon polyp 09/14/2015    Diarrhea due to malabsorption 11/14/2018    Difficult intubation     DVT of lower extremity, bilateral 11/04/2020    Emphysema of lung 04/10/2023    Family history of colon cancer 07/28/2020    Fibromyalgia     Folate deficiency     Fungemia 09/27/2020    GERD (gastroesophageal reflux disease)     Hard to intubate 04/20/2021    Hemifacial spasm 09/16/2015    Hiatal hernia 2014    Hypertension     Hyponatremia 09/10/2023    Impaired functional mobility, balance, gait, and endurance 07/24/2015    Lactose intolerance     Major depressive disorder, recurrent episode, moderate 06/02/2023    History of suicidal ideation    Metastatic squamous cell carcinoma to lymph node 10/11/2018    Nephrostomy complication 10/26/2023    Neuropathy due to chemotherapeutic drug 11/14/2018    Osteoarthritis of back     Peritonitis 09/22/2020    QT prolongation 04/16/2021    Refusal of blood transfusions as patient is [place]     Schatzki's ring 09/14/2015    Seen on outside EGD 05/2014, underwent esophageal dilatation. Bx were negative.     Seizure-like activity 12/02/2018    Sleep stage dysfunction     Noted on PSG 06/2017; negative for obstructive sleep apnea     Small bowel obstruction 08/19/2023    Stroke     Urinary tract  infection associated with nephrostomy catheter 06/11/2020    recurrent MDR UTI/pyelonephritis     Past Surgical History:   Procedure Laterality Date    ANASTOMOSIS OF INTESTINE N/A 2/6/2024    Procedure: ANASTOMOSIS, INTESTINE - Ileocolic anastomosis;  Surgeon: Hammad Reynolds MD;  Location: [facility] OR 2ND FLR;  Service: Colon and Rectal;  Laterality: N/A;    ANTEGRADE NEPHROSTOGRAPHY Bilateral 9/28/2020    Procedure: Nephrostogram - antegrade;  Surgeon: Leslie Balbuena MD;  Location: [facility] OR 1ST FLR;  Service: Urology;  Laterality: Bilateral;    ANTEGRADE NEPHROSTOGRAPHY Left 4/20/2021    Procedure: NEPHROSTOGRAM, ANTEGRADE;  Surgeon: Leslie Balbuena MD;  Location: [facility] OR 1ST FLR;  Service: Urology;  Laterality: Left;    ANTEGRADE NEPHROSTOGRAPHY Right 10/27/2022    Procedure: NEPHROSTOGRAM, ANTEGRADE;  Surgeon: Chirag Russ MD;  Location: [facility] OR 1ST FLR;  Service: Urology;  Laterality: Right;    ANTEGRADE NEPHROSTOGRAPHY Right 4/21/2023    Procedure: NEPHROSTOGRAM, ANTEGRADE;  Surgeon: Chirag Russ MD;  Location: [facility] OR 2ND FLR;  Service: Urology;  Laterality: Right;    ANTEGRADE NEPHROSTOGRAPHY Left 6/6/2023    Procedure: Nephrostogram - antegrade;  Surgeon: Leslie Balbuena MD;  Location: [facility] OR 1ST FLR;  Service: Urology;  Laterality: Left;    BILATERAL OOPHORECTOMY  2015    CHOLECYSTECTOMY  11/09/2016    Done at [facility], path showed chronic cholecystitis and gallstones    cold knife conization  2014    COLECTOMY, RIGHT  9/17/2020    Procedure: COLECTOMY, RIGHT Extended;  Surgeon: Hammad Reynolds MD;  Location: [facility] OR 2ND FLR;  Service: Colon and Rectal;;    COLONOSCOPY  2014    COLONOSCOPY N/A 10/24/2016    at [facility], Dr Thomas    COLONOSCOPY N/A 5/18/2018    Procedure: COLONOSCOPY;  Surgeon: Arden Gutiérrez MD;  Location: [facility] (4TH FLR);  Service: Endoscopy;  Laterality: N/A;    COLONOSCOPY N/A 7/28/2020    Procedure: COLONOSCOPY;  Surgeon: Hammad Reynolds MD;  Location: [facility]  (4TH FLR);  Service: Colon and Rectal;  Laterality: N/A;  covid test [place] 7/25    CYSTOSCOPY WITH URETEROSCOPY, RETROGRADE PYELOGRAPHY, AND INSERTION OF STENT Bilateral 3/21/2020    Procedure: CYSTOSCOPY, WITH RETROGRADE PYELOGRAM,;  Surgeon: Leslie Balbuena MD;  Location: [facility] OR 1ST FLR;  Service: Urology;  Laterality: Bilateral;    DILATION OF NEPHROSTOMY TRACT Right 10/27/2022    Procedure: DILATION, NEPHROSTOMY TRACT;  Surgeon: Chirag Russ MD;  Location: [facility] OR 1ST FLR;  Service: Urology;  Laterality: Right;    ESOPHAGOGASTRODUODENOSCOPY  2014    ESOPHAGOGASTRODUODENOSCOPY  11/18/2020    ESOPHAGOGASTRODUODENOSCOPY N/A 11/18/2020    Procedure: ESOPHAGOGASTRODUODENOSCOPY (EGD);  Surgeon: Zenon Spencer MD;  Location: [facility];  Service: Endoscopy;  Laterality: N/A;    ESOPHAGOGASTRODUODENOSCOPY N/A 12/11/2020    Procedure: EGD (ESOPHAGOGASTRODUODENOSCOPY);  Surgeon: Juancho Muse MD;  Location: [facility] (2ND FLR);  Service: Endoscopy;  Laterality: N/A;    EXCISION, SMALL INTESTINE  2/6/2024    Procedure: EXCISION, SMALL INTESTINE;  Surgeon: Hammad Reynolds MD;  Location: [facility] OR 2ND FLR;  Service: Colon and Rectal;;    HYSTERECTOMY  2014    [facility] for cervical cancer    ILEOSTOMY  9/17/2020    Procedure: CREATION, ILEOSTOMY;  Surgeon: Hammad Reynolds MD;  Location: [facility] OR 2ND FLR;  Service: Colon and Rectal;;    ILEOSTOMY CLOSURE N/A 2/6/2024    Procedure: CLOSURE, ILEOSTOMY;  Surgeon: Hammad Reynolds MD;  Location: [facility] OR 2ND FLR;  Service: Colon and Rectal;  Laterality: N/A;    LAPAROTOMY, EXPLORATORY N/A 2/6/2024    Procedure: OPEN LAPAROTOMY, EXPLORATORY;  Surgeon: Leslie Balbuena MD;  Location: [facility] OR 2ND FLR;  Service: Urology;  Laterality: N/A;  22 modifier    LOOPOGRAM N/A 4/20/2021    Procedure: LOOPOGRAM;  Surgeon: Leslie Balbuena MD;  Location: [facility] OR 1ST FLR;  Service: Urology;  Laterality: N/A;    LOOPOGRAM N/A 6/6/2023    Procedure: LOOPOGRAM;  Surgeon: Leslie MICHELLE  MD Sree;  Location: [facility] OR 1ST FLR;  Service: Urology;  Laterality: N/A;    LYSIS OF ADHESIONS  2/6/2024    Procedure: LYSIS, ADHESIONS;  Surgeon: Leslie Balbuena MD;  Location: [facility] OR 2ND FLR;  Service: Urology;;    LYSIS OF ADHESIONS  2/6/2024    Procedure: LYSIS, ADHESIONS;  Surgeon: Hammad Reynolds MD;  Location: [facility] OR 2ND FLR;  Service: Colon and Rectal;;    MOBILIZATION OF SPLENIC FLEXURE N/A 9/11/2020    Procedure: MOBILIZATION, COLONIC;  Surgeon: Hammad Reynolds MD;  Location: [facility] OR 2ND FLR;  Service: Colon and Rectal;  Laterality: N/A;    NEPHROSTOGRAPHY Bilateral 4/17/2021    Procedure: Nephrostogram;  Surgeon: Celeste Surgeon;  Location: [facility];  Service: Anesthesiology;  Laterality: Bilateral;    OOPHORECTOMY      PERCUTANEOUS NEPHROLITHOTOMY Right 4/21/2023    Procedure: NEPHROLITHOTOMY, PERCUTANEOUS;  Surgeon: Chirag Russ MD;  [facility];  Service: Urology;  Laterality: Right;  2.5 hrs    PERCUTANEOUS NEPHROSTOMY  4/21/2023    Procedure: CREATION, NEPHROSTOMY, PERCUTANEOUS with removal of existing nephrostomy tube;  Surgeon: Chirag Russ MD;  [facility];  Service: Urology;;    PYELOSCOPY Right 10/27/2022    Procedure: PYELOSCOPY;  Surgeon: Chirag Russ MD;  [facility];  Service: Urology;  Laterality: Right;    REPLACEMENT OF NEPHROSTOMY TUBE Right 10/27/2022    Procedure: REPLACEMENT, NEPHROSTOMY TUBE;  Surgeon: Chirag Russ MD;  [facility];  Service: Urology;  Laterality: Right;    RETROPERITONEAL LYMPHADENECTOMY Right 9/17/2018    Procedure: LYMPHADENECTOMY, RETROPERITONEUM;  Surgeon: Sebas Reed MD;  [facility];  Service: General;  Laterality: Right;  ILIAC    REVISION OF ANASTOMOSIS OF URETER TO ILEAL CONDUIT N/A 2/6/2024    Procedure: REVISION, ANASTOMOSIS, URETEROILEAL;  Surgeon: Leslie Balbuena MD;  Location: [facility] OR 2ND FLR;  Service: Urology;  Laterality: N/A;     URETEROSCOPIC REMOVAL OF URETERIC CALCULUS Right 10/27/2022    Procedure: REMOVAL, CALCULUS, URETER, URETEROSCOPIC;  Surgeon: Chirag Russ MD;  Location: [facility] OR [address] [place] [address];  Service: Urology;  Laterality: Right;    URETEROSCOPY Right 10/27/2022    Procedure: URETEROSCOPY-ANTEGRADE;  Surgeon: Chirag Russ MD;  Location: [facility] OR [address] [place] [address];  Service: Urology;  Laterality: Right;       Home Meds:   Prior to Admission medications    Medication Sig Start Date End Date Taking? Authorizing Provider   acetaminophen (TYLENOL) 500 MG tablet Take 1 tablet (500 mg total) by mouth every 6 (six) hours as needed for Pain. 2/18/24   Dave Tatum MD   amLODIPine (NORVASC) 5 MG tablet Take 1 tablet (5 mg total) by mouth once daily. 8/13/24 8/13/25  Brittney Bolton MD   ampicillin (PRINCIPEN) 500 MG capsule Take 1 capsule (500 mg total) by mouth every 6 (six) hours. 2/24/25   Leslie Balbuena MD   ferrous sulfate (FEOSOL) 325 mg (65 mg iron) Tab tablet TAKE 1 TABLET BY MOUTH TWICE A DAY 3/12/24   Leslie Balbuena MD   loratadine (CLARITIN) 10 mg tablet Take 10 mg by mouth daily as needed for Allergies. 9/5/23   Provider, Historical   mirtazapine (REMERON) 30 MG tablet Take 1 tablet (30 mg total) by mouth nightly. 11/6/23 5/6/25  Andriy Coley MD   oxyCODONE-acetaminophen (PERCOCET) 5-325 mg per tablet Take 1 tablet by mouth every 8 (eight) hours as needed for Pain.    Provider, Historical   pantoprazole (PROTONIX) 40 MG tablet Take 1 tablet (40 mg total) by mouth once daily. for 14 days 8/13/24 5/6/25  Brittney Bolton MD     Anticoagulants/Antiplatelets: no anticoagulation    Allergies:   Review of patient's allergies indicates:   Allergen Reactions    Bee sting [allergen ext-venom-honey bee]      Rash      Grass pollen-bermuda, standard      rash     Sedation History:  no adverse reactions            OBJECTIVE:     Vital Signs (Most Recent)  Temp: 98.5 °F (36.9 °C) (05/17/25 1144)  Pulse: 79 (05/17/25 1144)  Resp:  18 (05/17/25 1144)  BP: (!) 143/80 (05/17/25 1144)  SpO2: 98 % (05/17/25 1144)    Physical Exam:  ASA: 2  Mallampati: 2    General: no acute distress  Mental Status: alert and oriented to person, place and time  HEENT: normocephalic, atraumatic  Chest: unlabored breathing  Heart: regular heart rate  Abdomen: nondistended  Extremity: moves all extremities    Laboratory  Lab Results   Component Value Date    INR 1.0 08/07/2024       Lab Results   Component Value Date    WBC 5.69 12/28/2024    HGB 12.0 12/28/2024    HCT 40.4 12/28/2024    MCV 92 12/28/2024     12/28/2024      Lab Results   Component Value Date    GLU 88 05/05/2025     05/05/2025    K 4.5 05/05/2025     05/05/2025    CO2 27 05/05/2025    BUN 19 05/05/2025    CREATININE 1.2 05/05/2025    CALCIUM 9.3 05/05/2025    MG 1.8 12/27/2024    ALT 15 05/05/2025    AST 18 05/05/2025    ALBUMIN 3.3 (L) 05/05/2025    BILITOT 0.3 05/05/2025    BILIDIR 0.2 12/04/2020       ASSESSMENT/PLAN:     Sedation Plan: moderate  Patient will undergo R PCN exchange.    Chirag Pace MD  Interventional Radiology  Department of Radiology

## 2025-05-17 NOTE — PLAN OF CARE
Pt arrived to  for  Right Neph tube change. Pt oriented to unit and staff. Plan of care reviewed with patient, patient verbalizes understanding. Comfort measures utilized. Pt safely transferred from stretcher to procedural table. Fall risk reviewed with patient, fall risk interventions maintained. Safety strap applied, positioner pillows utilized to minimize pressure points. Blankets applied. Pt prepped and draped utilizing standard sterile technique. Patient placed on continuous monitoring, as required by sedation policy. Timeouts completed utilizing standard universal time-out, per department and facility policy. RN to remain at bedside, continuous monitoring maintained. Pt resting comfortably. Denies pain/discomfort.  See flow sheets for monitoring, medication administration, and updates.

## 2025-05-17 NOTE — CONSULTS
[facility] - Emergency Dept  Urology  Consult Note    Patient Name: Edita Riley  MRN: [id number]  Admission Date: 5/17/2025  Hospital Length of Stay: 0   Code Status: Prior   Attending Provider: Kayley Fontanez,*   Consulting Provider: Lupe Oneil MD  Primary Care Physician: Hernan Jack MD  Principal Problem:<principal problem not specified>    Inpatient consult to Urology  Consult performed by: Lupe Oneil MD  Consult ordered by: Kayley Fontanez MD  Reason for consult: Dislodged nephrostomy tube          Subjective:     HPI:  Edita Riley is a 60 year old women who has a history of pelvic irradiation with ureteral strictures bilaterally.  History of colo-colonic anastomotic leak and peritonitis following colonic urinary conduit creation, s/p extended right colectomy with end ileostomy and left ureter-colonic conduit anastomotic stricture s/p ureteral re-implant to a transverse colon conduit on 2/6/2024, lysis of adhesions, takedown of ileostomy with ileosigmoid anastamosis. Right hydronephrosis has been managed by nephrostomy tubes. The patient was scheduled for an antegrade ureteroscopy to clear debris from right ureter.    She presents to the ED today due to dislodging right nephrostomy tube on door handle. On assessment the nephrostomy tube irrigated poorly. She denies a recent history of fevers, chills, nausea and vomiting    CT Abdomen and Pelvis shows right right nephrostomy tube has been pulled into a calyx. No residual hydronephrosis bilaterally.     Past Medical History:   Diagnosis Date    Abnormal mammogram 08/25/2020    Anemia due to chronic blood loss     Anxiety     Bilateral ureteral obstruction 09/11/2020    obstructive uropathy now s/p transverse colon conduit, s/p right  Nephrostomy tube, s/p Ileostomy,    Cervical cancer 2014    s/p XRT & Bilateral Ureteral strictures with obstructive uropathy    Chronic back pain     CKD (chronic kidney disease)      Colon polyp 09/14/2015    Diarrhea due to malabsorption 11/14/2018    Difficult intubation     DVT of lower extremity, bilateral 11/04/2020    Emphysema of lung 04/10/2023    Family history of colon cancer 07/28/2020    Fibromyalgia     Folate deficiency     Fungemia 09/27/2020    GERD (gastroesophageal reflux disease)     Hard to intubate 04/20/2021    Hemifacial spasm 09/16/2015    Hiatal hernia 2014    Hypertension     Hyponatremia 09/10/2023    Impaired functional mobility, balance, gait, and endurance 07/24/2015    Lactose intolerance     Major depressive disorder, recurrent episode, moderate 06/02/2023    History of suicidal ideation    Metastatic squamous cell carcinoma to lymph node 10/11/2018    Nephrostomy complication 10/26/2023    Neuropathy due to chemotherapeutic drug 11/14/2018    Osteoarthritis of back     Peritonitis 09/22/2020    QT prolongation 04/16/2021    Refusal of blood transfusions as patient is [employer]     Schatzki's ring 09/14/2015    Seen on outside EGD 05/2014, underwent esophageal dilatation. Bx were negative.     Seizure-like activity 12/02/2018    Sleep stage dysfunction     Noted on PSG 06/2017; negative for obstructive sleep apnea     Small bowel obstruction 08/19/2023    Stroke     Urinary tract infection associated with nephrostomy catheter 06/11/2020    recurrent MDR UTI/pyelonephritis       Past Surgical History:   Procedure Laterality Date    ANASTOMOSIS OF INTESTINE N/A 2/6/2024    Procedure: ANASTOMOSIS, INTESTINE - Ileocolic anastomosis;  Surgeon: Hammad Reynolds MD;  Location: [facility] OR [address] [place] [address];  Service: Colon and Rectal;  Laterality: N/A;    ANTEGRADE NEPHROSTOGRAPHY Bilateral 9/28/2020    Procedure: Nephrostogram - antegrade;  Surgeon: Leslie Balbuena MD;  Location: [facility] OR [address] [place] [address];  Service: Urology;  Laterality: Bilateral;    ANTEGRADE NEPHROSTOGRAPHY Left 4/20/2021    Procedure: NEPHROSTOGRAM, ANTEGRADE;  Surgeon: Leslie Balbuena MD;  Location: [facility] OR  1ST FLR;  Service: Urology;  Laterality: Left;    ANTEGRADE NEPHROSTOGRAPHY Right 10/27/2022    Procedure: NEPHROSTOGRAM, ANTEGRADE;  Surgeon: Chirag Russ MD;  Location: [facility] OR 1ST FLR;  Service: Urology;  Laterality: Right;    ANTEGRADE NEPHROSTOGRAPHY Right 4/21/2023    Procedure: NEPHROSTOGRAM, ANTEGRADE;  Surgeon: Chirag Russ MD;  Location: [facility] OR 2ND FLR;  Service: Urology;  Laterality: Right;    ANTEGRADE NEPHROSTOGRAPHY Left 6/6/2023    Procedure: Nephrostogram - antegrade;  Surgeon: Leslie Balbuena MD;  Location: [facility] OR 1ST FLR;  Service: Urology;  Laterality: Left;    BILATERAL OOPHORECTOMY  2015    CHOLECYSTECTOMY  11/09/2016    Done at [facility], path showed chronic cholecystitis and gallstones    cold knife conization  2014    COLECTOMY, RIGHT  9/17/2020    Procedure: COLECTOMY, RIGHT Extended;  Surgeon: Hammad Reynolds MD;  Location: [facility] OR 2ND FLR;  Service: Colon and Rectal;;    COLONOSCOPY  2014    COLONOSCOPY N/A 10/24/2016    at [facility], Dr Gutiérrez    COLONOSCOPY N/A 5/18/2018    Procedure: COLONOSCOPY;  Surgeon: Arden Gutiérrez MD;  Location: [facility] (4TH FLR);  Service: Endoscopy;  Laterality: N/A;    COLONOSCOPY N/A 7/28/2020    Procedure: COLONOSCOPY;  Surgeon: Hammad Reynolds MD;  Location: [facility] ENDO (4TH FLR);  Service: Colon and Rectal;  Laterality: N/A;  covid test [place] 7/25    CYSTOSCOPY WITH URETEROSCOPY, RETROGRADE PYELOGRAPHY, AND INSERTION OF STENT Bilateral 3/21/2020    Procedure: CYSTOSCOPY, WITH RETROGRADE PYELOGRAM,;  Surgeon: Leslie Balbuena MD;  Location: [facility] OR 1ST FLR;  Service: Urology;  Laterality: Bilateral;    DILATION OF NEPHROSTOMY TRACT Right 10/27/2022    Procedure: DILATION, NEPHROSTOMY TRACT;  Surgeon: Chirag Russ MD;  Location: [facility] OR 1ST FLR;  Service: Urology;  Laterality: Right;    ESOPHAGOGASTRODUODENOSCOPY  2014    ESOPHAGOGASTRODUODENOSCOPY  11/18/2020    ESOPHAGOGASTRODUODENOSCOPY N/A 11/18/2020    Procedure:  ESOPHAGOGASTRODUODENOSCOPY (EGD);  Surgeon: Zenon Spencer MD;  Location: [facility] ENDO;  Service: Endoscopy;  Laterality: N/A;    ESOPHAGOGASTRODUODENOSCOPY N/A 12/11/2020    Procedure: EGD (ESOPHAGOGASTRODUODENOSCOPY);  Surgeon: Juancho Muse MD;  Location: [facility] ENDO (2ND FLR);  Service: Endoscopy;  Laterality: N/A;    EXCISION, SMALL INTESTINE  2/6/2024    Procedure: EXCISION, SMALL INTESTINE;  Surgeon: Hammad Reynolds MD;  Location: [facility] OR 2ND FLR;  Service: Colon and Rectal;;    HYSTERECTOMY  2014    [facility] for cervical cancer    ILEOSTOMY  9/17/2020    Procedure: CREATION, ILEOSTOMY;  Surgeon: Hammad Reynolds MD;  Location: [facility] OR 2ND FLR;  Service: Colon and Rectal;;    ILEOSTOMY CLOSURE N/A 2/6/2024    Procedure: CLOSURE, ILEOSTOMY;  Surgeon: Hammad Reynolds MD;  Location: [facility] OR 2ND FLR;  Service: Colon and Rectal;  Laterality: N/A;    LAPAROTOMY, EXPLORATORY N/A 2/6/2024    Procedure: OPEN LAPAROTOMY, EXPLORATORY;  Surgeon: Leslie Balbuena MD;  Location: [facility] OR 2ND FLR;  Service: Urology;  Laterality: N/A;  22 modifier    LOOPOGRAM N/A 4/20/2021    Procedure: LOOPOGRAM;  Surgeon: Leslie Balbuena MD;  Location: [facility] OR 1ST FLR;  Service: Urology;  Laterality: N/A;    LOOPOGRAM N/A 6/6/2023    Procedure: LOOPOGRAM;  Surgeon: Leslie Balbuena MD;  Location: [facility] OR 1ST FLR;  Service: Urology;  Laterality: N/A;    LYSIS OF ADHESIONS  2/6/2024    Procedure: LYSIS, ADHESIONS;  Surgeon: Leslie Balbuena MD;  Location: [facility] OR 2ND FLR;  Service: Urology;;    LYSIS OF ADHESIONS  2/6/2024    Procedure: LYSIS, ADHESIONS;  Surgeon: Hammad Reynolds MD;  Location: [facility] OR 2ND FLR;  Service: Colon and Rectal;;    MOBILIZATION OF SPLENIC FLEXURE N/A 9/11/2020    Procedure: MOBILIZATION, COLONIC;  Surgeon: Hammad Reynolds MD;  Location: [facility] OR 2ND FLR;  Service: Colon and Rectal;  Laterality: N/A;    NEPHROSTOGRAPHY Bilateral 4/17/2021    Procedure: Nephrostogram;  Surgeon: Celeste Surgeon;  Location: [facility]  [facility];  Service: Anesthesiology;  Laterality: Bilateral;    OOPHORECTOMY      PERCUTANEOUS NEPHROLITHOTOMY Right 4/21/2023    Procedure: NEPHROLITHOTOMY, PERCUTANEOUS;  Surgeon: Chirag Russ MD;  [facility];  Service: Urology;  Laterality: Right;  2.5 hrs    PERCUTANEOUS NEPHROSTOMY  4/21/2023    Procedure: CREATION, NEPHROSTOMY, PERCUTANEOUS with removal of existing nephrostomy tube;  Surgeon: Chirag Russ MD;  Location: [facility] OR [address] [place] [address];  Service: Urology;;    PYELOSCOPY Right 10/27/2022    Procedure: PYELOSCOPY;  Surgeon: Chirag Russ MD;  Location: [facility] OR [address] [place] [address];  Service: Urology;  Laterality: Right;    REPLACEMENT OF NEPHROSTOMY TUBE Right 10/27/2022    Procedure: REPLACEMENT, NEPHROSTOMY TUBE;  Surgeon: Chirag Russ MD;  Location: [facility] OR [address] [place] [address];  Service: Urology;  Laterality: Right;    RETROPERITONEAL LYMPHADENECTOMY Right 9/17/2018    Procedure: LYMPHADENECTOMY, RETROPERITONEUM;  Surgeon: Sebas Reed MD;  Location: [facility] OR [address] [place] [address];  Service: General;  Laterality: Right;  ILIAC    REVISION OF ANASTOMOSIS OF URETER TO ILEAL CONDUIT N/A 2/6/2024    Procedure: REVISION, ANASTOMOSIS, URETEROILEAL;  Surgeon: Leslie Balbuena MD;  Location: [facility] OR [address] [place] [address];  Service: Urology;  Laterality: N/A;    URETEROSCOPIC REMOVAL OF URETERIC CALCULUS Right 10/27/2022    Procedure: REMOVAL, CALCULUS, URETER, URETEROSCOPIC;  Surgeon: Chirag Russ MD;  Location: [facility] OR [address] [place] [address];  Service: Urology;  Laterality: Right;    URETEROSCOPY Right 10/27/2022    Procedure: URETEROSCOPY-ANTEGRADE;  Surgeon: Chirag Russ MD;  Location: [facility] OR [address] [place] [address];  Service: Urology;  Laterality: Right;       Review of patient's allergies indicates:   Allergen Reactions    Bee sting [allergen ext-venom-honey bee]      Rash      Grass pollen-bermuda, standard      rash       Family History       Problem Relation (Age of Onset)    Breast cancer Maternal Aunt    Cancer Father, Mother    Cervical  "cancer Mother    Colon cancer Father    Drug abuse Daughter, Daughter    Esophageal cancer Father    Heart disease Sister, Maternal Grandmother    Suicide Daughter            Tobacco Use    Smoking status: Never    Smokeless tobacco: Never   Substance and Sexual Activity    Alcohol use: No     Alcohol/week: 0.0 standard drinks of alcohol    Drug use: No    Sexual activity: Yes     Partners: Male     Birth control/protection: None     Comment:  19 years since 1999       Review of Systems   Constitutional:  Negative for chills and fever.       Objective:     Temp:  [98.5 °F (36.9 °C)] 98.5 °F (36.9 °C)  Pulse:  [79] 79  Resp:  [18] 18  SpO2:  [98 %] 98 %  BP: (143)/(80) 143/80  Weight: 88.5 kg (195 lb)  Body mass index is 28.8 kg/m².           Drains       Drain  Duration                  Urostomy 02/06/24 1815 ureterostomy, left 465 days         Urostomy 02/25/24 1600  days         Nephrostomy 05/06/25 1103 Right 10 Fr. 11 days                     Physical Exam  Vitals reviewed.   Pulmonary:      Effort: Pulmonary effort is normal.   Abdominal:      General: Abdomen is flat.      Palpations: Abdomen is soft.      Comments: Urostomy  Right nephrostomy tube difficult to irrigate    Skin:     Capillary Refill: Capillary refill takes less than 2 seconds.   Neurological:      General: No focal deficit present.      Mental Status: She is alert and oriented to person, place, and time.   Psychiatric:         Mood and Affect: Mood normal.          Significant Labs:    BMP:  No results for input(s): "NA", "K", "CL", "CO2", "BUN", "CREATININE", "LABGLOM", "GLUCOSE", "CALCIUM" in the last 168 hours.    CBC:  No results for input(s): "WBC", "HGB", "HCT", "PLT" in the last 168 hours.    Blood Culture: No results for input(s): "LABBLOO" in the last 168 hours.  Urine Culture: No results for input(s): "LABURIN" in the last 168 hours.  Urine Studies: No results for input(s): "COLORU", "APPEARANCEUA", "PHUR", "SPECGRAV", " ""PROTEINUA", "GLUCUA", "KETONESU", "BILIRUBINUA", "OCCULTUA", "NITRITE", "UROBILINOGEN", "LEUKOCYTESUR", "RBCUA", "WBCUA", "BACTERIA", "SQUAMEPITHEL", "HYALINECASTS" in the last 168 hours.    Invalid input(s): "WRIGHTSUR"    Significant Imaging:  All pertinent imaging results/findings from the past 24 hours have been reviewed.                    Assessment and Plan:     Malfunction of nephrostomy tube  Edita Riley presents today after manually dislodging nephrostomy tube on door handle.     - NPO for nephrostomy tube replacement   - No labs  - Pain is controlled  - The patient is due to have an antegrade ureteroscopy with Dr. Balbuena in July   - Okay for discharge from Urologic perspective         VTE Risk Mitigation (From admission, onward)      None            Thank you for your consult. I will follow-up with patient. Please contact us if you have any additional questions.    Lupe Oneil MD  Urology  [facility] - Emergency Dept  "

## 2025-05-17 NOTE — PROCEDURES
Radiology Post-Procedure Note    Pre Op Diagnosis: R PCN malpositioning  Post Op Diagnosis: Same    Procedure: R PCN exchange    Procedure performed by: Chirag Pace MD    Written Informed Consent Obtained: Yes  Specimen Removed: NO  Estimated Blood Loss: Minimal    Findings:   No hydronephrosis. 10 Fr R PCN with pigtail in renal pelvis.     Patient tolerated procedure well.    Leave to bag drainage.   Patient to return to IR in 10-12 weeks for routine exchange.     Chirag Pace MD  Interventional Radiology  Department of Radiology

## 2025-05-17 NOTE — ED TRIAGE NOTES
Arrives via POV stating her nephrostomy tube was accidentally pulled on by a door know. Pt states she has the tube due to chronic kidney stone issues, states she does not urinate, states the tube is still draining but has pain at the site. Sutures appear to be pulled out. Tube is still intact.

## 2025-05-17 NOTE — SUBJECTIVE & OBJECTIVE
Past Medical History:   Diagnosis Date    Abnormal mammogram 08/25/2020    Anemia due to chronic blood loss     Anxiety     Bilateral ureteral obstruction 09/11/2020    obstructive uropathy now s/p transverse colon conduit, s/p right  Nephrostomy tube, s/p Ileostomy,    Cervical cancer 2014    s/p XRT & Bilateral Ureteral strictures with obstructive uropathy    Chronic back pain     CKD (chronic kidney disease)     Colon polyp 09/14/2015    Diarrhea due to malabsorption 11/14/2018    Difficult intubation     DVT of lower extremity, bilateral 11/04/2020    Emphysema of lung 04/10/2023    Family history of colon cancer 07/28/2020    Fibromyalgia     Folate deficiency     Fungemia 09/27/2020    GERD (gastroesophageal reflux disease)     Hard to intubate 04/20/2021    Hemifacial spasm 09/16/2015    Hiatal hernia 2014    Hypertension     Hyponatremia 09/10/2023    Impaired functional mobility, balance, gait, and endurance 07/24/2015    Lactose intolerance     Major depressive disorder, recurrent episode, moderate 06/02/2023    History of suicidal ideation    Metastatic squamous cell carcinoma to lymph node 10/11/2018    Nephrostomy complication 10/26/2023    Neuropathy due to chemotherapeutic drug 11/14/2018    Osteoarthritis of back     Peritonitis 09/22/2020    QT prolongation 04/16/2021    Refusal of blood transfusions as patient is [employer]     Schatzki's ring 09/14/2015    Seen on outside EGD 05/2014, underwent esophageal dilatation. Bx were negative.     Seizure-like activity 12/02/2018    Sleep stage dysfunction     Noted on PSG 06/2017; negative for obstructive sleep apnea     Small bowel obstruction 08/19/2023    Stroke     Urinary tract infection associated with nephrostomy catheter 06/11/2020    recurrent MDR UTI/pyelonephritis       Past Surgical History:   Procedure Laterality Date    ANASTOMOSIS OF INTESTINE N/A 2/6/2024    Procedure: ANASTOMOSIS, INTESTINE - Ileocolic anastomosis;  Surgeon: Gail  Hammad ARREGUIN MD;  Location: [facility] OR 2ND FLR;  Service: Colon and Rectal;  Laterality: N/A;    ANTEGRADE NEPHROSTOGRAPHY Bilateral 9/28/2020    Procedure: Nephrostogram - antegrade;  Surgeon: Leslie Balbuena MD;  Location: [facility] OR 1ST FLR;  Service: Urology;  Laterality: Bilateral;    ANTEGRADE NEPHROSTOGRAPHY Left 4/20/2021    Procedure: NEPHROSTOGRAM, ANTEGRADE;  Surgeon: Leslie Balbuena MD;  Location: [facility] OR 1ST FLR;  Service: Urology;  Laterality: Left;    ANTEGRADE NEPHROSTOGRAPHY Right 10/27/2022    Procedure: NEPHROSTOGRAM, ANTEGRADE;  Surgeon: Chirag Russ MD;  Location: [facility] OR 1ST FLR;  Service: Urology;  Laterality: Right;    ANTEGRADE NEPHROSTOGRAPHY Right 4/21/2023    Procedure: NEPHROSTOGRAM, ANTEGRADE;  Surgeon: Chirag Russ MD;  Location: [facility] OR 2ND FLR;  Service: Urology;  Laterality: Right;    ANTEGRADE NEPHROSTOGRAPHY Left 6/6/2023    Procedure: Nephrostogram - antegrade;  Surgeon: Leslie Balbuena MD;  Location: [facility] OR 1ST FLR;  Service: Urology;  Laterality: Left;    BILATERAL OOPHORECTOMY  2015    CHOLECYSTECTOMY  11/09/2016    Done at [facility], path showed chronic cholecystitis and gallstones    cold knife conization  2014    COLECTOMY, RIGHT  9/17/2020    Procedure: COLECTOMY, RIGHT Extended;  Surgeon: Hammad Reynolds MD;  Location: [facility] OR 2ND FLR;  Service: Colon and Rectal;;    COLONOSCOPY  2014    COLONOSCOPY N/A [facility]/24/2016    [facility]    COLONOSCOPY N/A 5/18/2018    Procedure: COLONOSCOPY;  Surgeon: Arden Gutiérrez MD;  Location: [facility] ENDO (4TH FLR);  Service: Endoscopy;  Laterality: N/A;    COLONOSCOPY N/A 7/28/2020    Procedure: COLONOSCOPY;  Surgeon: Hammad Reynolds MD;  Location: [facility] ENDO (4TH FLR);  Service: Colon and Rectal;  Laterality: N/A;  covid test [place] 7/25    CYSTOSCOPY WITH URETEROSCOPY, RETROGRADE PYELOGRAPHY, AND INSERTION OF STENT Bilateral 3/21/2020    Procedure: CYSTOSCOPY, WITH RETROGRADE PYELOGRAM,;  Surgeon: Leslie Balbuena MD;   Location: [facility] OR 1ST FLR;  Service: Urology;  Laterality: Bilateral;    DILATION OF NEPHROSTOMY TRACT Right 10/27/2022    Procedure: DILATION, NEPHROSTOMY TRACT;  Surgeon: Chirag Russ MD;  Location: [facility] OR 1ST FLR;  Service: Urology;  Laterality: Right;    ESOPHAGOGASTRODUODENOSCOPY  2014    ESOPHAGOGASTRODUODENOSCOPY  11/18/2020    ESOPHAGOGASTRODUODENOSCOPY N/A 11/18/2020    Procedure: ESOPHAGOGASTRODUODENOSCOPY (EGD);  Surgeon: Zneon Spencer MD;  Location: [facility] ENDO;  Service: Endoscopy;  Laterality: N/A;    ESOPHAGOGASTRODUODENOSCOPY N/A 12/11/2020    Procedure: EGD (ESOPHAGOGASTRODUODENOSCOPY);  Surgeon: Juancho Muse MD;  Location: [facility] ENDO (2ND FLR);  Service: Endoscopy;  Laterality: N/A;    EXCISION, SMALL INTESTINE  2/6/2024    Procedure: EXCISION, SMALL INTESTINE;  Surgeon: Hammad Reynolds MD;  Location: [facility] OR 2ND FLR;  Service: Colon and Rectal;;    HYSTERECTOMY  2014    [facility] for cervical cancer    ILEOSTOMY  9/17/2020    Procedure: CREATION, ILEOSTOMY;  Surgeon: Hammad Reynolds MD;  Location: [facility] 2ND FLR;  Service: Colon and Rectal;;    ILEOSTOMY CLOSURE N/A 2/6/2024    Procedure: CLOSURE, ILEOSTOMY;  Surgeon: Hammad Reynolds MD;  Location: [facility] OR 2ND FLR;  Service: Colon and Rectal;  Laterality: N/A;    LAPAROTOMY, EXPLORATORY N/A 2/6/2024    Procedure: OPEN LAPAROTOMY, EXPLORATORY;  Surgeon: Leslie Balbuena MD;  Location: [facility] 2ND FLR;  Service: Urology;  Laterality: N/A;  22 modifier    LOOPOGRAM N/A 4/20/2021    Procedure: LOOPOGRAM;  Surgeon: Leslie Balbuena MD;  Location: [facility] OR 1ST FLR;  Service: Urology;  Laterality: N/A;    LOOPOGRAM N/A 6/6/2023    Procedure: LOOPOGRAM;  Surgeon: Leslie Balbuena MD;  Location: [facility] OR 1ST FLR;  Service: Urology;  Laterality: N/A;    LYSIS OF ADHESIONS  2/6/2024    Procedure: LYSIS, ADHESIONS;  Surgeon: Leslie Balbuena MD;  Location: [facility] OR [address] [place] [address];  Service: Urology;;    LYSIS OF ADHESIONS  2/6/2024    Procedure: LYSIS,  ADHESIONS;  Surgeon: Hammad Reynolds MD;  [facility] 2ND FLR;  Service: Colon and Rectal;;    MOBILIZATION OF SPLENIC FLEXURE N/A 9/11/2020    Procedure: MOBILIZATION, COLONIC;  Surgeon: Hammad Reynolds MD;  [facility]: [facility] 2ND FLR;  Service: Colon and Rectal;  Laterality: N/A;    NEPHROSTOGRAPHY Bilateral 4/17/2021    Procedure: Nephrostogram;  Surgeon: Celeste Surgeon;  [facility]: [facility];  Service: Anesthesiology;  Laterality: Bilateral;    OOPHORECTOMY      PERCUTANEOUS NEPHROLITHOTOMY Right 4/21/2023    Procedure: NEPHROLITHOTOMY, PERCUTANEOUS;  Surgeon: Chirag Russ MD;  Location: [facility] OR 2ND FLR;  Service: Urology;  Laterality: Right;  2.5 hrs    PERCUTANEOUS NEPHROSTOMY  4/21/2023    Procedure: CREATION, NEPHROSTOMY, PERCUTANEOUS with removal of existing nephrostomy tube;  Surgeon: Chirag Russ MD;  Location: [facility] OR [facility] FLR;  Service: Urology;;    PYELOSCOPY Right 10/27/2022    Procedure: PYELOSCOPY;  Surgeon: Chirag Russ MD;  Location: [facility] OR 1ST FLR;  Service: Urology;  Laterality: Right;    REPLACEMENT OF NEPHROSTOMY TUBE Right 10/27/2022    Procedure: REPLACEMENT, NEPHROSTOMY TUBE;  Surgeon: Chirag Russ MD;  Location: [facility] OR 1ST FLR;  Service: Urology;  Laterality: Right;    RETROPERITONEAL LYMPHADENECTOMY Right 9/17/2018    Procedure: LYMPHADENECTOMY, RETROPERITONEUM;  Surgeon: Sebas Reed MD;  Location: [facility] OR 2ND FLR;  Service: General;  Laterality: Right;  ILIAC    REVISION OF ANASTOMOSIS OF URETER TO ILEAL CONDUIT N/A 2/6/2024    Procedure: REVISION, ANASTOMOSIS, URETEROILEAL;  Surgeon: Leslie Balbuena MD;  Location: [facility] OR 2ND FLR;  Service: Urology;  Laterality: N/A;    URETEROSCOPIC REMOVAL OF URETERIC CALCULUS Right 10/27/2022    Procedure: REMOVAL, CALCULUS, URETER, URETEROSCOPIC;  Surgeon: Chirag Russ MD;  Location: [facility] OR 1ST FLR;  Service: Urology;  Laterality: Right;    URETEROSCOPY Right 10/27/2022    Procedure: URETEROSCOPY-ANTEGRADE;  " Surgeon: Chirag Russ MD;  Location: [facility] OR [address] [place] [address];  Service: Urology;  Laterality: Right;       Review of patient's allergies indicates:   Allergen Reactions    Bee sting [allergen ext-venom-honey bee]      Rash      Grass pollen-bermuda, standard      rash       Family History       Problem Relation (Age of Onset)    Breast cancer Maternal Aunt    Cancer Father, Mother    Cervical cancer Mother    Colon cancer Father    Drug abuse Daughter, Daughter    Esophageal cancer Father    Heart disease Sister, Maternal Grandmother    Suicide Daughter            Tobacco Use    Smoking status: Never    Smokeless tobacco: Never   Substance and Sexual Activity    Alcohol use: No     Alcohol/week: 0.0 standard drinks of alcohol    Drug use: No    Sexual activity: Yes     Partners: Male     Birth control/protection: None     Comment:  17 years since 1999       Review of Systems   Constitutional:  Negative for chills and fever.       Objective:     Temp:  [98.5 °F (36.9 °C)] 98.5 °F (36.9 °C)  Pulse:  [79] 79  Resp:  [18] 18  SpO2:  [98 %] 98 %  BP: (143)/(80) 143/80  Weight: 88.5 kg (195 lb)  Body mass index is 28.8 kg/m².           Drains       Drain  Duration                  Urostomy 02/06/24 1815 ureterostomy, left 465 days         Urostomy 02/25/24 1600  days         Nephrostomy 05/06/25 1103 Right 10 Fr. 11 days                     Physical Exam  Vitals reviewed.   Pulmonary:      Effort: Pulmonary effort is normal.   Abdominal:      General: Abdomen is flat.      Palpations: Abdomen is soft.      Comments: Urostomy  Right nephrostomy tube difficult to irrigate    Skin:     Capillary Refill: Capillary refill takes less than 2 seconds.   Neurological:      General: No focal deficit present.      Mental Status: She is alert and oriented to person, place, and time.   Psychiatric:         Mood and Affect: Mood normal.          Significant Labs:    BMP:  No results for input(s): "NA", "K", "CL", "CO2", " ""BUN", "CREATININE", "LABGLOM", "GLUCOSE", "CALCIUM" in the last 168 hours.    CBC:  No results for input(s): "WBC", "HGB", "HCT", "PLT" in the last 168 hours.    Blood Culture: No results for input(s): "LABBLOO" in the last 168 hours.  Urine Culture: No results for input(s): "LABURIN" in the last 168 hours.  Urine Studies: No results for input(s): "COLORU", "APPEARANCEUA", "PHUR", "SPECGRAV", "PROTEINUA", "GLUCUA", "KETONESU", "BILIRUBINUA", "OCCULTUA", "NITRITE", "UROBILINOGEN", "LEUKOCYTESUR", "RBCUA", "WBCUA", "BACTERIA", "SQUAMEPITHEL", "HYALINECASTS" in the last 168 hours.    Invalid input(s): "WRIGHTSUR"    Significant Imaging:  All pertinent imaging results/findings from the past 24 hours have been reviewed.                  "

## 2025-05-17 NOTE — ASSESSMENT & PLAN NOTE
Edita Rajandelicia presents today after manually dislodging nephrostomy tube on door handle.     - NPO for nephrostomy tube replacement   - No labs  - Pain is controlled  - The patient is due to have an antegrade ureteroscopy with Dr. Balbuena in July   - Okay for discharge from Urologic perspective
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
## 2025-06-30 ENCOUNTER — TELEPHONE (OUTPATIENT)
Facility: CLINIC | Age: 60
End: 2025-06-30
Payer: MEDICAID

## 2025-07-01 ENCOUNTER — OFFICE VISIT (OUTPATIENT)
Facility: CLINIC | Age: 60
End: 2025-07-01
Payer: MEDICAID

## 2025-07-01 ENCOUNTER — HOSPITAL ENCOUNTER (OUTPATIENT)
Dept: CARDIOLOGY | Facility: CLINIC | Age: 60
Discharge: HOME | End: 2025-07-01
Payer: MEDICAID

## 2025-07-01 VITALS
WEIGHT: 193 LBS | DIASTOLIC BLOOD PRESSURE: 85 MMHG | RESPIRATION RATE: 18 BRPM | OXYGEN SATURATION: 98 % | TEMPERATURE: 98 F | SYSTOLIC BLOOD PRESSURE: 138 MMHG | HEIGHT: 69 IN | HEART RATE: 68 BPM | BODY MASS INDEX: 28.58 KG/M2

## 2025-07-01 DIAGNOSIS — T45.1X5A NEUROPATHY DUE TO CHEMOTHERAPEUTIC DRUG: ICD-10-CM

## 2025-07-01 DIAGNOSIS — Z86.73 HISTORY OF STROKE: ICD-10-CM

## 2025-07-01 DIAGNOSIS — G62.0 NEUROPATHY DUE TO CHEMOTHERAPEUTIC DRUG: ICD-10-CM

## 2025-07-01 DIAGNOSIS — Z01.818 PREOP TESTING: ICD-10-CM

## 2025-07-01 DIAGNOSIS — I10 ESSENTIAL HYPERTENSION: ICD-10-CM

## 2025-07-01 DIAGNOSIS — K76.0 FATTY LIVER: ICD-10-CM

## 2025-07-01 DIAGNOSIS — K64.8 INTERNAL HEMORRHOIDS: ICD-10-CM

## 2025-07-01 DIAGNOSIS — Z53.1 PROCEDURE AND TREATMENT NOT CARRIED OUT BECAUSE OF PATIENT'S DECISION FOR REASONS OF BELIEF AND GROUP PRESSURE: ICD-10-CM

## 2025-07-01 DIAGNOSIS — N18.30 STAGE 3 CHRONIC KIDNEY DISEASE, UNSPECIFIED WHETHER STAGE 3A OR 3B CKD: ICD-10-CM

## 2025-07-01 DIAGNOSIS — G43.009 MIGRAINE WITHOUT AURA AND WITHOUT STATUS MIGRAINOSUS, NOT INTRACTABLE: ICD-10-CM

## 2025-07-01 DIAGNOSIS — K21.00 GASTROESOPHAGEAL REFLUX DISEASE WITH ESOPHAGITIS, UNSPECIFIED WHETHER HEMORRHAGE: ICD-10-CM

## 2025-07-01 DIAGNOSIS — Z78.9 DIFFICULT INTRAVENOUS ACCESS: ICD-10-CM

## 2025-07-01 DIAGNOSIS — G56.01 CARPAL TUNNEL SYNDROME ON RIGHT: Primary | ICD-10-CM

## 2025-07-01 DIAGNOSIS — M19.90 ARTHRITIS: ICD-10-CM

## 2025-07-01 DIAGNOSIS — J30.1 ALLERGIC RHINITIS DUE TO POLLEN, UNSPECIFIED SEASONALITY: ICD-10-CM

## 2025-07-01 DIAGNOSIS — Z86.718 HISTORY OF DVT (DEEP VEIN THROMBOSIS): Chronic | ICD-10-CM

## 2025-07-01 DIAGNOSIS — T88.4XXD HARD TO INTUBATE, SUBSEQUENT ENCOUNTER: ICD-10-CM

## 2025-07-01 LAB
OHS QRS DURATION: 80 MS
OHS QTC CALCULATION: 454 MS

## 2025-07-01 PROCEDURE — 1159F MED LIST DOCD IN RCRD: CPT | Mod: CPTII,,, | Performed by: HOSPITALIST

## 2025-07-01 PROCEDURE — 93005 ELECTROCARDIOGRAM TRACING: CPT | Mod: PBBFAC | Performed by: INTERNAL MEDICINE

## 2025-07-01 PROCEDURE — 3079F DIAST BP 80-89 MM HG: CPT | Mod: CPTII,,, | Performed by: HOSPITALIST

## 2025-07-01 PROCEDURE — 3075F SYST BP GE 130 - 139MM HG: CPT | Mod: CPTII,,, | Performed by: HOSPITALIST

## 2025-07-01 PROCEDURE — 3008F BODY MASS INDEX DOCD: CPT | Mod: CPTII,,, | Performed by: HOSPITALIST

## 2025-07-01 PROCEDURE — 99999 PR PBB SHADOW E&M-EST. PATIENT-LVL III: CPT | Mod: PBBFAC,,, | Performed by: HOSPITALIST

## 2025-07-01 PROCEDURE — 1160F RVW MEDS BY RX/DR IN RCRD: CPT | Mod: CPTII,,, | Performed by: HOSPITALIST

## 2025-07-01 PROCEDURE — 93010 ELECTROCARDIOGRAM REPORT: CPT | Mod: S$PBB,,, | Performed by: INTERNAL MEDICINE

## 2025-07-01 PROCEDURE — 99215 OFFICE O/P EST HI 40 MIN: CPT | Mod: S$PBB,,, | Performed by: HOSPITALIST

## 2025-07-01 PROCEDURE — 99213 OFFICE O/P EST LOW 20 MIN: CPT | Mod: PBBFAC,25 | Performed by: HOSPITALIST

## 2025-07-01 NOTE — ASSESSMENT & PLAN NOTE
Creatinine stable  Stages of CKD discussed  Deleterious effects NSAID's , Beneficial effects of Hydration discussed   Acetaminophen ( If not intolerant ) as needed for pain     I  suggest monitoring renal function, in put and out put status socorro-operatively. I  suggest avoiding nephrotoxic medication including NSAIDs, COX2 inhibitors, intravenous contrast agent,avoiding hypotension to prevent further renal impairment.   Care with intravenous contrast discussed     No liver problems

## 2025-07-01 NOTE — ASSESSMENT & PLAN NOTE
Her history suggests DVT in 2021 in the set of recovery after surgery  History of DVT  NoPE  1 Episode  Associated with reduced mobility, no long journeys, no cancer, prior surgery, Hospital stay, noHRT  No IVC filter       She had no recurrence of blood clot  Increased risk of thrombosis in the socorro operative period , compression stocking use discussed      2021     No evidence of deep venous thrombosis in either lower extremity.        She has not had any blood clot recently

## 2025-07-01 NOTE — HPI
History of present illness- I had the pleasure of meeting this pleasant 60 y.o. lady in the pre op clinic prior to her elective Urological surgery. The patient is known to me .   Offered to have family to be on the phone during the consultation    I have obtained the history by speaking to the patient and by reviewing the electronic health records.    Events leading up to surgery / History of presenting illness -    I have obtained the information by speaking to her  She is having pain in the lower back and on the side on the right side-8/10-had it for long time  She is planning on having urological surgery on   She has noticed that hydrating helps reduce the pain  Resting, rubbing with the hands helps    Had kidney stone problems about 4 times  She stays hydrated  Not known to have elevated calcium or uric acid or Parathyroid Problem Or gout  No blood in the urine    She had a complex history where she had cervical cancer around  for which she had surgery   She had recurrence of cancer in  when she had chemotherapy and radiation  She has no recurrence of cancer since    As per chart review-colon conduit 2020. That was complicated by colon perforation and she had a diverting colostomy. She had a left ureteral conduit scarring and this was repaired on 2024 and the colostomy was reversed at the same time.     She has a nephrostomy tube on the right side    To her understanding, she has no urinary tract infection and has some discomfort with the nephrostomy goes into the skin  No fever     Relevant health conditions of significance for the perioperative period/ History of presenting illness -    Lives with has been in a ground level apartment and has 4 steps to take to get into the apartment  Currently not working  Pets- 2 dogs  Children -2 Twin daughters-  Pregnancies - 1  Miscarriages - none  C sections - 1  Has help post op    Not known to have HTN, heart problem , Prediabetes ,  Diabetes Mellitus, Lung problem, Thyroid problem,  pulmonary embolism, sleep apnea, COVID infection, fatty liver , blood vessels stent , tobacco smoking, edema, mental health problems , gout

## 2025-07-01 NOTE — OUTPATIENT SUBJECTIVE & OBJECTIVE
"Outpatient Subjective & Objective     Chief complaint-Preoperative evaluation, Perioperative Medical management, complication reduction plan     Active cardiac conditions- none    Revised cardiac risk index predictors- none    Functional capacity -Examples of physical activity  house work and can take 1 flight of stairs----- She can undertake all the above activities without  chest pain,chest tightness, Shortness of breath ,dizziness,lightheadedness making her exercise tolerance more,   than 4 Mets.        Review of Systems   Constitutional:  Negative for chills and fever.        No unusual weight changes      HENT:          RODRIGUEZG score  / 8           Age over 50         Eyes:         No new visual changes   Respiratory:          Allergies  Dry cough   No Hemoptysis   Cardiovascular:         As noted   Gastrointestinal:         No overt GI/ blood losses  She had hysterectomy with cervical cancer  Understanding, she has 1 ovary retained   Endocrine:        Prednisone use > 20 mg daily for 3 weeks- none   Genitourinary:  Negative for dysuria.        No urinary hesitancy    Musculoskeletal:         As above   No new back problem   Skin:  Negative for rash.   Neurological:  Negative for syncope.        No unilateral weakness   Hematological:         Current use of Anticoagulants  none   Psychiatric/Behavioral:            No SI/HI   No eye problems  Or difficulty swallowing            No anesthesia, bleeding, cardiac problems, PONV with previous surgeries/procedures.  Medications and Allergies reviewed in epic.   FH- No anesthesia,bleeding / venous thrombosis ,   in family      Physical Exam  Blood pressure 138/85, pulse 68, temperature 98.3 °F (36.8 °C), temperature source Oral, resp. rate 18, height 5' 9" (1.753 m), weight 87.5 kg (193 lb), last menstrual period 06/08/2014, SpO2 98%.  I offered a sheet and the presence of a chaperone during physical examination   She was comfortable to proceed with the exam " without the the presence of a chaperone        Physical Exam  Constitutional- Vitals - Body mass index is 28.5 kg/m².,   Vitals:    07/01/25 0812   BP: 138/85   Pulse: 68   Resp: 18   Temp: 98.3 °F (36.8 °C)     General appearance-Conscious,Coherent  Eyes- No conjunctival icterus,pupils  round   ENT-Oral cavity- moist    , Hearing grossly normal   Neck- No thyromegaly ,Trachea -central, No jugular venous distension,   No Carotid Bruit   Cardiovascular -Heart Sounds- Normal  and  no murmur   , No gallop rhythm   Respiratory - Normal Respiratory Effort, Normal breath sounds,  no wheeze , and  no forced expiratory wheeze    Peripheral pitting pedal edema-- none , no calf pain   Gastrointestinal -Soft abdomen, No palpable masses, Non Tender,Liver,Spleen not palpable. No-- free fluid and shifting dullness  Musculoskeletal- No finger Clubbing. Strength grossly normal   Lymphatic-No Palpable cervical, axillary,Inguinal lymphadenopathy   Psychiatric - normal effect,Orientation  Rt Dorsalis pedis pulses-palpable    Lt Dorsalis pedis pulses- palpable   Rt Posterior tibial pulses -palpable   Left posterior tibial pulses -palpable   Miscellaneous -  no asterixis,  no dupuytren's contracture, and  no renal bruit     Investigations  Lab and Imaging have been reviewed in epic.    2022    1. Scintigraphically negative for ischemia or infarct.  2. The global left ventricular systolic function is normal with an LV ejection fraction of greater than 75 % and no evidence of LV dilatation. Wall motion is normal.         Review of old records- Was done and information gathered regards to events leading to surgery and health conditions of significance in the perioperative period.    Outpatient Subjective & Objective

## 2025-07-01 NOTE — H&P (VIEW-ONLY)
[facility]-PreOp Consults  Progress Note    Patient Name: Edita Riley  MRN: [id number]  Date of Evaluation- 07/01/2025  PCP- Hernan Jack MD    Future cases for Edita Riley [[id number]]       Case ID Status Date Time [place] Procedure Provider Location    [id number] [facility] 7/8/2025  7:00  NEPHROLITHOTOMY, PERCUTANEOUS Leslie Balbuena MD [[id number]] [facility] OR 2ND FLR            HPI:  History of present illness- I had the pleasure of meeting this pleasant 60 y.o. lady in the pre op clinic prior to her elective Urological surgery. The patient is known to me .   Offered to have family to be on the phone during the consultation    I have obtained the history by speaking to the patient and by reviewing the electronic health records.    Events leading up to surgery / History of presenting illness -    I have obtained the information by speaking to her  She is having pain in the lower back and on the side on the right side-8/10-had it for long time  She is planning on having urological surgery on July 8th  She has noticed that hydrating helps reduce the pain  Resting, rubbing with the hands helps    Had kidney stone problems about 4 times  She stays hydrated  Not known to have elevated calcium or uric acid or Parathyroid Problem Or gout  No blood in the urine    She had a complex history where she had cervical cancer around 2015 for which she had surgery   She had recurrence of cancer in 2020 when she had chemotherapy and radiation  She has no recurrence of cancer since    As per chart review-colon conduit 9/11/2020. That was complicated by colon perforation and she had a diverting colostomy. She had a left ureteral conduit scarring and this was repaired on 2/6/2024 and the colostomy was reversed at the same time.     She has a nephrostomy tube on the right side    To her understanding, she has no urinary tract infection and has some discomfort with the nephrostomy goes into the skin  No fever     Relevant  health conditions of significance for the perioperative period/ History of presenting illness -    Lives with has been in a ground level apartment and has 4 steps to take to get into the apartment  Currently not working  Pets- 2 dogs  Children -2 Twin daughters-  Pregnancies - 1  Miscarriages - none  C sections - 1  Has help post op    Not known to have HTN, heart problem , Prediabetes , Diabetes Mellitus, Lung problem, Thyroid problem,  pulmonary embolism, sleep apnea, COVID infection, fatty liver , blood vessels stent , tobacco smoking, edema, mental health problems , gout             Subjective/ Objective:     Chief complaint-Preoperative evaluation, Perioperative Medical management, complication reduction plan     Active cardiac conditions- none    Revised cardiac risk index predictors- none    Functional capacity -Examples of physical activity  house work and can take 1 flight of stairs----- She can undertake all the above activities without  chest pain,chest tightness, Shortness of breath ,dizziness,lightheadedness making her exercise tolerance more,   than 4 Mets.        Review of Systems   Constitutional:  Negative for chills and fever.        No unusual weight changes      HENT:          STOPBANG score  / 8           Age over 50         Eyes:         No new visual changes   Respiratory:          Allergies  Dry cough   No Hemoptysis   Cardiovascular:         As noted   Gastrointestinal:         No overt GI/ blood losses  She had hysterectomy with cervical cancer  Understanding, she has 1 ovary retained   Endocrine:        Prednisone use > 20 mg daily for 3 weeks- none   Genitourinary:  Negative for dysuria.        No urinary hesitancy    Musculoskeletal:         As above   No new back problem   Skin:  Negative for rash.   Neurological:  Negative for syncope.        No unilateral weakness   Hematological:         Current use of Anticoagulants  none   Psychiatric/Behavioral:            No SI/HI   No  "eye problems  Or difficulty swallowing            No anesthesia, bleeding, cardiac problems, PONV with previous surgeries/procedures.  Medications and Allergies reviewed in epic.   FH- No anesthesia,bleeding / venous thrombosis ,   in family      Physical Exam  Blood pressure 138/85, pulse 68, temperature 98.3 °F (36.8 °C), temperature source Oral, resp. rate 18, height 5' 9" (1.753 m), weight 87.5 kg (193 lb), last menstrual period 06/08/2014, SpO2 98%.  I offered a sheet and the presence of a chaperone during physical examination   She was comfortable to proceed with the exam without the the presence of a chaperone        Physical Exam  Constitutional- Vitals - Body mass index is 28.5 kg/m².,   Vitals:    07/01/25 0812   BP: 138/85   Pulse: 68   Resp: 18   Temp: 98.3 °F (36.8 °C)     General appearance-Conscious,Coherent  Eyes- No conjunctival icterus,pupils  round   ENT-Oral cavity- moist    , Hearing grossly normal   Neck- No thyromegaly ,Trachea -central, No jugular venous distension,   No Carotid Bruit   Cardiovascular -Heart Sounds- Normal  and  no murmur   , No gallop rhythm   Respiratory - Normal Respiratory Effort, Normal breath sounds,  no wheeze , and  no forced expiratory wheeze    Peripheral pitting pedal edema-- none , no calf pain   Gastrointestinal -Soft abdomen, No palpable masses, Non Tender,Liver,Spleen not palpable. No-- free fluid and shifting dullness  Musculoskeletal- No finger Clubbing. Strength grossly normal   Lymphatic-No Palpable cervical, axillary,Inguinal lymphadenopathy   Psychiatric - normal effect,Orientation  Rt Dorsalis pedis pulses-palpable    Lt Dorsalis pedis pulses- palpable   Rt Posterior tibial pulses -palpable   Left posterior tibial pulses -palpable   Miscellaneous -  no asterixis,  no dupuytren's contracture, and  no renal bruit     Investigations  Lab and Imaging have been reviewed in epic.    2022    1. Scintigraphically negative for ischemia or infarct.  2. The global " left ventricular systolic function is normal with an LV ejection fraction of greater than 75 % and no evidence of LV dilatation. Wall motion is normal.         Review of old records- Was done and information gathered regards to events leading to surgery and health conditions of significance in the perioperative period.        Preoperative cardiac risk assessment-  The patient does not have any active cardiac conditions . Revised cardiac risk index predictors- 0---.Functional capacity is more than 4 Mets. She will be undergoing a Urological procedure that carries a Moderate Risk risk     Risk of a major Cardiac event ( Defined as death, myocardial infarction, or cardiac arrest at 30 days after noncardiac surgery), based on RCRI score     -3.9%         No further cardiac work up is indicated prior to proceeding with the surgery          American Society of Anesthesiologists Physical status classification ( ASA ) class: 3     Postoperative pulmonary complication risk assessment:      ARISCAT ( [place]et) risk index- risk class -  Low, if duration of surgery is under 3 hours, intermediate, if duration of surgery is over 3 hours       Assessment/Plan:     Carpal tunnel syndrome on right  Doing well    History of stroke  She had stroke when she was age, 10 y  Asper the history that I have obtained from her   She had right-sided facial weakness  ' she has no longer having any right-sided weakness  She has no longer have any right-sided facial drooping  Given the young age she was felt to have stroke, I wonder if she had Bell's palsy     Unsure if it is hemorrhagic or ischemic   No problem since     Not known to have diabetes, blood pressure at that time   No migraine at that time  2018  Unremarkable MRA of the intracranial circulation.     Migraine without aura and without status migrainosus, not intractable  Doing good, taking Tylenol as needed which is not much    Neuropathy due to chemotherapeutic drug  No tingling numbness  of the hands feet  She does not feel depressed and does not feel like she has a new mental health problems  She is taking mirtazapine for trouble sleeping    Allergic rhinitis due to pollen  Claritin as needed  No asthma or eczema    Hard to intubate  Difficulty intubation-as per her, she has not had any problem with this with the surgeries she has had in the past    Difficult intravenous access  I suggest that the perioperative team be aware of this   Multiple IV access in the past   No recreational drug usage    Essential hypertension  Not taking any blood pressure medication and blood pressure is acceptable    CKD (chronic kidney disease), stage III  Creatinine stable  Stages of CKD discussed  Deleterious effects NSAID's , Beneficial effects of Hydration discussed   Acetaminophen ( If not intolerant ) as needed for pain     I  suggest monitoring renal function, in put and out put status socorro-operatively. I  suggest avoiding nephrotoxic medication including NSAIDs, COX2 inhibitors, intravenous contrast agent,avoiding hypotension to prevent further renal impairment.   Care with intravenous contrast discussed     No liver problems    History of DVT (deep vein thrombosis)  Her history suggests DVT in 2021 in the set of recovery after surgery  History of DVT  NoPE  1 Episode  Associated with reduced mobility, no long journeys, no cancer, prior surgery, Hospital stay, noHRT  No IVC filter       She had no recurrence of blood clot  Increased risk of thrombosis in the socorro operative period , compression stocking use discussed      2021     No evidence of deep venous thrombosis in either lower extremity.        She has not had any blood clot recently    BMI 28.0-28.9,adult  Weight related conditions      Known to have      HTN  Acid reflux         Not troubled with / Not known to have         Type 2 Diabetes   Prediabetes   Gout   Hyperlipidemia   Sleep apnea   Fatty liver         Encouraged maintaining healthy weight  for improved health     Fatty liver  CT Dec 2024   Liver: The liver is mildly enlarged.  Mild diffuse fatty infiltration.  No focal abnormality.     - Gallbladder: No calcified gallstones.     - Bile Ducts: No evidence of intra or extra hepatic biliary ductal dilation.     - Spleen: Negative.     No alcohol excess   This likely is not alcohol fatty liver disease        No history  of cirrhosis of liver or suggestions of Liver  decompensation   No Jaundice , dark urine , pale stool   No easy bleeding or bruising     Weight loss and follow up discussed    Gastro-esophageal reflux disease with esophagitis  Taking Pepto-Bismol as needed  she is doing good    Internal hemorrhoids  Not trouble with hemorrhoids    Arthritis  No rheumatoid arthritis    Refusal of blood transfusions as patient is Sabianist  Suggested the perioperative care team be aware of this  Hemoglobin normal from December 2024         Preventive perioperative care    Thromboembolic prophylaxis:  Her risk factors for thrombosis include surgical procedure, previous history of thrombosis, and age.I suggest  thromboembolic prophylaxis ( mechanical/pharmacological, weighing the risk benefits of pharmacological agent use considering socorro procedural bleeding )  during the perioperative period.I suggested being active in the post operative period.      Postoperative pulmonary complication prophylaxis-Risk factors for post operative pulmonary complications include ASA class >2- I suggest incentive spirometry use, early ambulation, and end tidal carbon dioxide monitoring  , oral care , head end of bed elevation      Renal complication prophylaxis-Risk factors for renal complications include pre-existing renal disease . I suggest keeping her well hydrated  in the perioperative period.    Surgical site Infection Prophylaxis-I  suggest appropriate antibiotic for Prophylaxis against Surgical site infections  No reported Staph infection  Skin antibacterial  discussed           This visit was focused on Preoperative evaluation, Perioperative Medical management, complication reduction plans. I suggest that the patient follows up with primary care or relevant sub specialists for ongoing health care.    I appreciate the opportunity to be involved in this patients care. Please feel free to contact me if there were any questions about this consultation.    Patient is optimized    Patient/ care giver/ Family member was instructed to call and update me about any changes to health,  medication, office visits ,testing out side of the [facility] operative care center , hospitalizations between now and surgery      Sofie Driver MD  Internal Medicine  [facility]   Cell Phone- [phone]    History of COVID -  no   COVID vaccination status -    COVID screening     No fever   No SOB  No sore throat   No loss of taste or smell   No muscle aches   No nausea, vomiting , diarrhea     APTT was mildly abnormal in August of 2024 in the setting of acute kidney injury  Not known to have coagulation factor deficiency, Willebrand's disease, lupus anticoagulant  No easy bleeding bruising  No previous surgery related bleeding or any bleeding tendency   Message sent to the surgeon    MRI brain from 2018 showed no significant abnormality    I have spent -59----- minutes of time which includes, time spent to prepare to see the patient , obtaining history ,performing examination, counseling/Educating the patient , Documenting clinical information in the record    --    7/1- 16 09     CBC-hemoglobin, hematocrit and platelet count are normal    EKG personally reviewed reportedly showed      Normal sinus rhythm   Normal ECG   When compared with ECG of 24-Dec-2024 14:53,   No significant change was found   --    7/3- 12 54    Called to follow up , spoke to her to address any concerns with the up coming surgery or any questions on Medication instructions -  Doing well

## 2025-07-01 NOTE — PROGRESS NOTES
[facility]-PreOp Consults  Progress Note    Patient Name: Edita Riley  MRN: [id number]  Date of Evaluation- 07/01/2025  PCP- Hernan Jack MD    Future cases for Edita Riley [[id number]]       Case ID Status Date Time [place] Procedure Provider Location    [id number] [facility] 7/8/2025  7:00  NEPHROLITHOTOMY, PERCUTANEOUS Leslie Balbuena MD [[id number]] [facility] OR 2ND FLR            HPI:  History of present illness- I had the pleasure of meeting this pleasant 60 y.o. lady in the pre op clinic prior to her elective Urological surgery. The patient is known to me .   Offered to have family to be on the phone during the consultation    I have obtained the history by speaking to the patient and by reviewing the electronic health records.    Events leading up to surgery / History of presenting illness -    I have obtained the information by speaking to her  She is having pain in the lower back and on the side on the right side-8/10-had it for long time  She is planning on having urological surgery on July 8th  She has noticed that hydrating helps reduce the pain  Resting, rubbing with the hands helps    Had kidney stone problems about 4 times  She stays hydrated  Not known to have elevated calcium or uric acid or Parathyroid Problem Or gout  No blood in the urine    She had a complex history where she had cervical cancer around 2015 for which she had surgery   She had recurrence of cancer in 2020 when she had chemotherapy and radiation  She has no recurrence of cancer since    As per chart review-colon conduit 9/11/2020. That was complicated by colon perforation and she had a diverting colostomy. She had a left ureteral conduit scarring and this was repaired on 2/6/2024 and the colostomy was reversed at the same time.     She has a nephrostomy tube on the right side    To her understanding, she has no urinary tract infection and has some discomfort with the nephrostomy goes into the skin  No fever     Relevant  health conditions of significance for the perioperative period/ History of presenting illness -    Lives with has been in a ground level apartment and has 4 steps to take to get into the apartment  Currently not working  Pets- 2 dogs  Children -2 Twin daughters-  Pregnancies - 1  Miscarriages - none  C sections - 1  Has help post op    Not known to have HTN, heart problem , Prediabetes , Diabetes Mellitus, Lung problem, Thyroid problem,  pulmonary embolism, sleep apnea, COVID infection, fatty liver , blood vessels stent , tobacco smoking, edema, mental health problems , gout             Subjective/ Objective:     Chief complaint-Preoperative evaluation, Perioperative Medical management, complication reduction plan     Active cardiac conditions- none    Revised cardiac risk index predictors- none    Functional capacity -Examples of physical activity  house work and can take 1 flight of stairs----- She can undertake all the above activities without  chest pain,chest tightness, Shortness of breath ,dizziness,lightheadedness making her exercise tolerance more,   than 4 Mets.        Review of Systems   Constitutional:  Negative for chills and fever.        No unusual weight changes      HENT:          STOPBANG score  / 8           Age over 50         Eyes:         No new visual changes   Respiratory:          Allergies  Dry cough   No Hemoptysis   Cardiovascular:         As noted   Gastrointestinal:         No overt GI/ blood losses  She had hysterectomy with cervical cancer  Understanding, she has 1 ovary retained   Endocrine:        Prednisone use > 20 mg daily for 3 weeks- none   Genitourinary:  Negative for dysuria.        No urinary hesitancy    Musculoskeletal:         As above   No new back problem   Skin:  Negative for rash.   Neurological:  Negative for syncope.        No unilateral weakness   Hematological:         Current use of Anticoagulants  none   Psychiatric/Behavioral:            No SI/HI   No  "eye problems  Or difficulty swallowing            No anesthesia, bleeding, cardiac problems, PONV with previous surgeries/procedures.  Medications and Allergies reviewed in epic.   FH- No anesthesia,bleeding / venous thrombosis ,   in family      Physical Exam  Blood pressure 138/85, pulse 68, temperature 98.3 °F (36.8 °C), temperature source Oral, resp. rate 18, height 5' 9" (1.753 m), weight 87.5 kg (193 lb), last menstrual period 06/08/2014, SpO2 98%.  I offered a sheet and the presence of a chaperone during physical examination   She was comfortable to proceed with the exam without the the presence of a chaperone        Physical Exam  Constitutional- Vitals - Body mass index is 28.5 kg/m².,   Vitals:    07/01/25 0812   BP: 138/85   Pulse: 68   Resp: 18   Temp: 98.3 °F (36.8 °C)     General appearance-Conscious,Coherent  Eyes- No conjunctival icterus,pupils  round   ENT-Oral cavity- moist    , Hearing grossly normal   Neck- No thyromegaly ,Trachea -central, No jugular venous distension,   No Carotid Bruit   Cardiovascular -Heart Sounds- Normal  and  no murmur   , No gallop rhythm   Respiratory - Normal Respiratory Effort, Normal breath sounds,  no wheeze , and  no forced expiratory wheeze    Peripheral pitting pedal edema-- none , no calf pain   Gastrointestinal -Soft abdomen, No palpable masses, Non Tender,Liver,Spleen not palpable. No-- free fluid and shifting dullness  Musculoskeletal- No finger Clubbing. Strength grossly normal   Lymphatic-No Palpable cervical, axillary,Inguinal lymphadenopathy   Psychiatric - normal effect,Orientation  Rt Dorsalis pedis pulses-palpable    Lt Dorsalis pedis pulses- palpable   Rt Posterior tibial pulses -palpable   Left posterior tibial pulses -palpable   Miscellaneous -  no asterixis,  no dupuytren's contracture, and  no renal bruit     Investigations  Lab and Imaging have been reviewed in epic.    2022    1. Scintigraphically negative for ischemia or infarct.  2. The global " left ventricular systolic function is normal with an LV ejection fraction of greater than 75 % and no evidence of LV dilatation. Wall motion is normal.         Review of old records- Was done and information gathered regards to events leading to surgery and health conditions of significance in the perioperative period.        Preoperative cardiac risk assessment-  The patient does not have any active cardiac conditions . Revised cardiac risk index predictors- 0---.Functional capacity is more than 4 Mets. She will be undergoing a Urological procedure that carries a Moderate Risk risk     Risk of a major Cardiac event ( Defined as death, myocardial infarction, or cardiac arrest at 30 days after noncardiac surgery), based on RCRI score     -3.9%         No further cardiac work up is indicated prior to proceeding with the surgery          American Society of Anesthesiologists Physical status classification ( ASA ) class: 3     Postoperative pulmonary complication risk assessment:      ARISCAT ( [place]et) risk index- risk class -  Low, if duration of surgery is under 3 hours, intermediate, if duration of surgery is over 3 hours       Assessment/Plan:     Carpal tunnel syndrome on right  Doing well    History of stroke  She had stroke when she was age, 10 y  Asper the history that I have obtained from her   She had right-sided facial weakness  ' she has no longer having any right-sided weakness  She has no longer have any right-sided facial drooping  Given the young age she was felt to have stroke, I wonder if she had Bell's palsy     Unsure if it is hemorrhagic or ischemic   No problem since     Not known to have diabetes, blood pressure at that time   No migraine at that time  2018  Unremarkable MRA of the intracranial circulation.     Migraine without aura and without status migrainosus, not intractable  Doing good, taking Tylenol as needed which is not much    Neuropathy due to chemotherapeutic drug  No tingling numbness  of the hands feet  She does not feel depressed and does not feel like she has a new mental health problems  She is taking mirtazapine for trouble sleeping    Allergic rhinitis due to pollen  Claritin as needed  No asthma or eczema    Hard to intubate  Difficulty intubation-as per her, she has not had any problem with this with the surgeries she has had in the past    Difficult intravenous access  I suggest that the perioperative team be aware of this   Multiple IV access in the past   No recreational drug usage    Essential hypertension  Not taking any blood pressure medication and blood pressure is acceptable    CKD (chronic kidney disease), stage III  Creatinine stable  Stages of CKD discussed  Deleterious effects NSAID's , Beneficial effects of Hydration discussed   Acetaminophen ( If not intolerant ) as needed for pain     I  suggest monitoring renal function, in put and out put status socorro-operatively. I  suggest avoiding nephrotoxic medication including NSAIDs, COX2 inhibitors, intravenous contrast agent,avoiding hypotension to prevent further renal impairment.   Care with intravenous contrast discussed     No liver problems    History of DVT (deep vein thrombosis)  Her history suggests DVT in 2021 in the set of recovery after surgery  History of DVT  NoPE  1 Episode  Associated with reduced mobility, no long journeys, no cancer, prior surgery, Hospital stay, noHRT  No IVC filter       She had no recurrence of blood clot  Increased risk of thrombosis in the socorro operative period , compression stocking use discussed      2021     No evidence of deep venous thrombosis in either lower extremity.        She has not had any blood clot recently    BMI 28.0-28.9,adult  Weight related conditions      Known to have      HTN  Acid reflux         Not troubled with / Not known to have         Type 2 Diabetes   Prediabetes   Gout   Hyperlipidemia   Sleep apnea   Fatty liver         Encouraged maintaining healthy weight  for improved health     Fatty liver  CT Dec 2024   Liver: The liver is mildly enlarged.  Mild diffuse fatty infiltration.  No focal abnormality.     - Gallbladder: No calcified gallstones.     - Bile Ducts: No evidence of intra or extra hepatic biliary ductal dilation.     - Spleen: Negative.     No alcohol excess   This likely is not alcohol fatty liver disease        No history  of cirrhosis of liver or suggestions of Liver  decompensation   No Jaundice , dark urine , pale stool   No easy bleeding or bruising     Weight loss and follow up discussed    Gastro-esophageal reflux disease with esophagitis  Taking Pepto-Bismol as needed  she is doing good    Internal hemorrhoids  Not trouble with hemorrhoids    Arthritis  No rheumatoid arthritis    Refusal of blood transfusions as patient is V[place]oo  Suggested the perioperative care team be aware of this  Hemoglobin normal from December 2024         Preventive perioperative care    Thromboembolic prophylaxis:  Her risk factors for thrombosis include surgical procedure, previous history of thrombosis, and age.I suggest  thromboembolic prophylaxis ( mechanical/pharmacological, weighing the risk benefits of pharmacological agent use considering socorro procedural bleeding )  during the perioperative period.I suggested being active in the post operative period.      Postoperative pulmonary complication prophylaxis-Risk factors for post operative pulmonary complications include ASA class >2- I suggest incentive spirometry use, early ambulation, and end tidal carbon dioxide monitoring  , oral care , head end of bed elevation      Renal complication prophylaxis-Risk factors for renal complications include pre-existing renal disease . I suggest keeping her well hydrated  in the perioperative period.    Surgical site Infection Prophylaxis-I  suggest appropriate antibiotic for Prophylaxis against Surgical site infections  No reported Staph infection  Skin antibacterial  discussed           This visit was focused on Preoperative evaluation, Perioperative Medical management, complication reduction plans. I suggest that the patient follows up with primary care or relevant sub specialists for ongoing health care.    I appreciate the opportunity to be involved in this patients care. Please feel free to contact me if there were any questions about this consultation.    Patient is optimized    Patient/ care giver/ Family member was instructed to call and update me about any changes to health,  medication, office visits ,testing out side of the [facility] operative care center , hospitalizations between now and surgery      Sofie Driver MD  Internal Medicine  [facility]   Cell Phone- [phone]    History of COVID -  no   COVID vaccination status -    COVID screening     No fever   No SOB  No sore throat   No loss of taste or smell   No muscle aches   No nausea, vomiting , diarrhea     APTT was mildly abnormal in August of 2024 in the setting of acute kidney injury  Not known to have coagulation factor deficiency, Willebrand's disease, lupus anticoagulant  No easy bleeding bruising  No previous surgery related bleeding or any bleeding tendency   Message sent to the surgeon    MRI brain from 2018 showed no significant abnormality    I have spent -59----- minutes of time which includes, time spent to prepare to see the patient , obtaining history ,performing examination, counseling/Educating the patient , Documenting clinical information in the record    --    7/1- 16 09     CBC-hemoglobin, hematocrit and platelet count are normal    EKG personally reviewed reportedly showed      Normal sinus rhythm   Normal ECG   When compared with ECG of 24-Dec-2024 14:53,   No significant change was found   --    7/3- 12 54    Called to follow up , spoke to her to address any concerns with the up coming surgery or any questions on Medication instructions -  Doing well

## 2025-07-01 NOTE — ASSESSMENT & PLAN NOTE
No tingling numbness of the hands feet  She does not feel depressed and does not feel like she has a new mental health problems  She is taking mirtazapine for trouble sleeping

## 2025-07-01 NOTE — ASSESSMENT & PLAN NOTE
She had stroke when she was age, 10 y  Asper the history that I have obtained from her   She had right-sided facial weakness  ' she has no longer having any right-sided weakness  She has no longer have any right-sided facial drooping  Given the young age she was felt to have stroke, I wonder if she had Bell's palsy     Unsure if it is hemorrhagic or ischemic   No problem since     Not known to have diabetes, blood pressure at that time   No migraine at that time  2018  Unremarkable MRA of the intracranial circulation.

## 2025-07-01 NOTE — ASSESSMENT & PLAN NOTE
I suggest that the perioperative team be aware of this   Multiple IV access in the past   No recreational drug usage

## 2025-07-02 ENCOUNTER — TELEPHONE (OUTPATIENT)
Dept: UROLOGY | Facility: HOSPITAL | Age: 60
End: 2025-07-02
Payer: MEDICAID

## 2025-07-02 DIAGNOSIS — N20.1 URETERAL CALCULUS: Primary | ICD-10-CM

## 2025-07-02 RX ORDER — CEFDINIR 300 MG/1
300 CAPSULE ORAL 2 TIMES DAILY
Qty: 14 CAPSULE | Refills: 0 | Status: SHIPPED | OUTPATIENT
Start: 2025-07-02 | End: 2025-07-09

## 2025-07-02 NOTE — TELEPHONE ENCOUNTER
----- Message from Sofie Driver MD sent at 7/1/2025  9:23 AM CDT -----  Leslie    Just finished with a perioperative care evaluation  She is scheduled for surgery with you  on July 8th  To her understanding, she needs a CT and but I do not see that it is scheduled  Wanted to keep you informed    Also, please take a look at her APTT from August 2024 which is mild normal  APTT was mildly abnormal in August of 2024 in the setting of acute kidney injury  Not known to have coagulation factor deficiency, Willebrand's disease, lupus anticoagulant  No easy bleeding bruising  No previous surgery related bleeding or any bleeding tendency   Please let me know if you like that to be repeated or are you okay to proceed ?    Thank you    Ck

## 2025-07-03 NOTE — TELEPHONE ENCOUNTER
----- Message from Leslie Balbuena MD sent at 7/2/2025  6:04 PM CDT -----  Regarding: antibiotics  Please let the patient know that I sent cefdinir for her to take before her surgery on Tuesday.  Thanks.  ----- Message -----  From: Sofie Driver MD  Sent: 7/1/2025   9:25 AM CDT  To: MD Leslie Ramirez    Just finished with a perioperative care evaluation  She is scheduled for surgery with you  on July 8th  To her understanding, she needs a CT and but I do not see that it is scheduled  Wanted to keep you informed    Also, please take a look at her APTT from August 2024 which is mild normal  APTT was mildly abnormal in August of 2024 in the setting of acute kidney injury  Not known to have coagulation factor deficiency, Willebrand's disease, lupus anticoagulant  No easy bleeding bruising  No previous surgery related bleeding or any bleeding tendency   Please let me know if you like that to be repeated or are you okay to proceed ?    Thank you    Ck

## 2025-07-07 ENCOUNTER — TELEPHONE (OUTPATIENT)
Dept: UROLOGY | Facility: CLINIC | Age: 60
End: 2025-07-07
Payer: MEDICAID

## 2025-07-07 ENCOUNTER — ANESTHESIA EVENT (OUTPATIENT)
Dept: SURGERY | Facility: HOSPITAL | Age: 60
End: 2025-07-07
Payer: MEDICAID

## 2025-07-07 NOTE — ANESTHESIA PREPROCEDURE EVALUATION
[facility]  Anesthesia Pre-Operative Evaluation         Patient Name: Edita Riley  YOB: 1965  MRN: [id number]    SUBJECTIVE:     Pre-operative evaluation for Procedure(s) (LRB):  NEPHROLITHOTOMY, PERCUTANEOUS (N/A)     07/07/2025    Edita Riley is a 60 y.o. female w/ a significant PMHx of cervical CA s/p pelvic rad tx complicated by hydronephrosis, colon perforation, difficult intubation (prior grade IIa view with VL on 2/6/24), severe MDD, CVA? with residual right sided weakness, CKD3 w/ anemia of chronic disease, Jehovvah's witness, and HTN.    Patient now presents for the above procedure(s).    Results for orders placed during the hospital encounter of 10/20/22    Echo    Interpretation Summary  · The left ventricle is normal in size with mild concentric hypertrophy and normal systolic function.  · The estimated ejection fraction is 70%.  · Normal left ventricular diastolic function.  · Normal right ventricular size with normal right ventricular systolic function.  · Mild mitral regurgitation.  · Normal central venous pressure (3 mmHg).  · The estimated PA systolic pressure is 16 mmHg.       LDA:        Nephrostomy 05/17/25 1423 Right 10 Fr. (Active)   Number of days: 50            Urostomy 02/06/24 1815 ureterostomy, left (Active)   Number of days: 516            Urostomy 02/25/24 1600 LLQ (Active)   Number of days: 497       Prev airway:   Intubation:     Induction:  Intravenous    Intubated:  Postinduction    Mask Ventilation:  Easy mask    Attempts:  2    Attempted By:  CRNA    Method of Intubation:  Video laryngoscopy    Blade:  Patricia 3    Laryngeal View Grade: Grade I - full view of chords      Laryngeal View Grade comment:  Unable to advance tube past aretenoids and unable to pass eschmaan past aretenoids    Attempted By (2nd Attempt):  Staff anesthesiologist    Method of Intubation (2nd Attempt):  Video laryngoscopy    Laryngeal View Grade  (2nd Attempt): Grade I - full view of cords      Laryngeal View Grade (2nd Attempt) comment:  Difficulty passing the ett,evenutally obtained with cricoid pressure and repositioning    Difficult Airway Encountered?: Yes      Complications:  None    Airway Device:  Oral endotracheal tube    Airway Device Size:  7.0    Style/Cuff Inflation:  Cuffed    Tube secured:  24    Placement Verified By:  Capnometry    Complicating Factors:  Anterior larynx    Findings Post-Intubation:  BS equal bilateral    Drips: None documented.      Problem List[1]    Review of patient's allergies indicates:   Allergen Reactions    Bee sting [allergen ext-venom-honey bee]      Rash      Grass pollen-bermuda, standard      rash       Current Inpatient Medications:      Medications Ordered Prior to Encounter[2]    Past Surgical History:   Procedure Laterality Date    ANASTOMOSIS OF INTESTINE N/A 2/6/2024    Procedure: ANASTOMOSIS, INTESTINE - Ileocolic anastomosis;  Surgeon: Hammad Reynolds MD;  Location: [facility] OR 2ND FLR;  Service: Colon and Rectal;  Laterality: N/A;    ANTEGRADE NEPHROSTOGRAPHY Bilateral 9/28/2020    Procedure: Nephrostogram - antegrade;  Surgeon: Leslie Balbuena MD;  [facility];  Service: Urology;  Laterality: Bilateral;    ANTEGRADE NEPHROSTOGRAPHY Left 4/20/2021    Procedure: NEPHROSTOGRAM, ANTEGRADE;  Surgeon: Leslie Balbuena MD;  [facility];  Service: Urology;  Laterality: Left;    ANTEGRADE NEPHROSTOGRAPHY Right 10/27/2022    Procedure: NEPHROSTOGRAM, ANTEGRADE;  Surgeon: Chirag Russ MD;  [facility];  Service: Urology;  Laterality: Right;    ANTEGRADE NEPHROSTOGRAPHY Right 4/21/2023    Procedure: NEPHROSTOGRAM, ANTEGRADE;  Surgeon: Chirag Russ MD;  Location: [facility] OR 2ND FLR;  Service: Urology;  Laterality: Right;    ANTEGRADE NEPHROSTOGRAPHY Left 6/6/2023    Procedure: Nephrostogram - antegrade;  Surgeon: Leslie Balbuena MD;  [facility];   Service: Urology;  Laterality: Left;    BILATERAL OOPHORECTOMY  2015    CHOLECYSTECTOMY  11/09/2016    Done at [facility], path showed chronic cholecystitis and gallstones    cold knife conization  2014    COLECTOMY, RIGHT  9/17/2020    Procedure: COLECTOMY, RIGHT Extended;  Surgeon: Hammad Reynolds MD;  Location: [facility] OR 2ND FLR;  Service: Colon and Rectal;;    COLONOSCOPY  2014    COLONOSCOPY N/A [facility]/24/2016    [facility]    COLONOSCOPY N/A 5/18/2018    Procedure: COLONOSCOPY;  Surgeon: Arden Gutiérrez MD;  Location: [facility] (4TH FLR);  Service: Endoscopy;  Laterality: N/A;    COLONOSCOPY N/A 7/28/2020    Procedure: COLONOSCOPY;  Surgeon: Hammad Reynolds MD;  Location: [facility] (4TH FLR);  Service: Colon and Rectal;  Laterality: N/A;  covid test [place] 7/25    CYSTOSCOPY WITH URETEROSCOPY, RETROGRADE PYELOGRAPHY, AND INSERTION OF STENT Bilateral 3/21/2020    Procedure: CYSTOSCOPY, WITH RETROGRADE PYELOGRAM,;  Surgeon: Leslie Balbuena MD;  Location: [facility] OR 1ST FLR;  Service: Urology;  Laterality: Bilateral;    DILATION OF NEPHROSTOMY TRACT Right 10/27/2022    Procedure: DILATION, NEPHROSTOMY TRACT;  Surgeon: Chirag Russ MD;  Location: [facility] OR 1ST FLR;  Service: Urology;  Laterality: Right;    ESOPHAGOGASTRODUODENOSCOPY  2014    ESOPHAGOGASTRODUODENOSCOPY  11/18/2020    ESOPHAGOGASTRODUODENOSCOPY N/A 11/18/2020    Procedure: ESOPHAGOGASTRODUODENOSCOPY (EGD);  Surgeon: Zenon Spencer MD;  Location: [facility];  Service: Endoscopy;  Laterality: N/A;    ESOPHAGOGASTRODUODENOSCOPY N/A 12/11/2020    Procedure: EGD (ESOPHAGOGASTRODUODENOSCOPY);  Surgeon: Juancho Muse MD;  Location: [facility] (2ND FLR);  Service: Endoscopy;  Laterality: N/A;    EXCISION, SMALL INTESTINE  2/6/2024    Procedure: EXCISION, SMALL INTESTINE;  Surgeon: Hammad Reynolds MD;  Location: [facility] OR 2ND FLR;  Service: Colon and Rectal;;    HYSTERECTOMY  2014    [facility] for cervical cancer    ILEOSTOMY  9/17/2020    Procedure:  CREATION, ILEOSTOMY;  Surgeon: Hammad Reynolds MD;  Location: [facility] OR 2ND FLR;  Service: Colon and Rectal;;    ILEOSTOMY CLOSURE N/A 2/6/2024    Procedure: CLOSURE, ILEOSTOMY;  Surgeon: Hammad Reynolds MD;  Location: [facility] OR 2ND FLR;  Service: Colon and Rectal;  Laterality: N/A;    LAPAROTOMY, EXPLORATORY N/A 2/6/2024    Procedure: OPEN LAPAROTOMY, EXPLORATORY;  Surgeon: Leslie Balbuena MD;  Location: [facility] OR 2ND FLR;  Service: Urology;  Laterality: N/A;  22 modifier    LOOPOGRAM N/A 4/20/2021    Procedure: LOOPOGRAM;  Surgeon: Leslie Balbuena MD;  Location: [facility] OR 1ST FLR;  Service: Urology;  Laterality: N/A;    LOOPOGRAM N/A 6/6/2023    Procedure: LOOPOGRAM;  Surgeon: Leslie Balbuena MD;  Location: [facility] OR 1ST FLR;  Service: Urology;  Laterality: N/A;    LYSIS OF ADHESIONS  2/6/2024    Procedure: LYSIS, ADHESIONS;  Surgeon: Leslie Balbuena MD;  Location: [facility] OR 2ND FLR;  Service: Urology;;    LYSIS OF ADHESIONS  2/6/2024    Procedure: LYSIS, ADHESIONS;  Surgeon: Hammad Reynolds MD;  Location: [facility] OR 2ND FLR;  Service: Colon and Rectal;;    MOBILIZATION OF SPLENIC FLEXURE N/A 9/11/2020    Procedure: MOBILIZATION, COLONIC;  Surgeon: Hammad Reynolds MD;  Location: [facility] OR 2ND FLR;  Service: Colon and Rectal;  Laterality: N/A;    NEPHROSTOGRAPHY Bilateral 4/17/2021    Procedure: Nephrostogram;  Surgeon: Celeste Surgeon;  Location: [facility];  Service: Anesthesiology;  Laterality: Bilateral;    OOPHORECTOMY      PERCUTANEOUS NEPHROLITHOTOMY Right 4/21/2023    Procedure: NEPHROLITHOTOMY, PERCUTANEOUS;  Surgeon: Chirag Russ MD;  Location: [facility] OR 2ND FLR;  Service: Urology;  Laterality: Right;  2.5 hrs    PERCUTANEOUS NEPHROSTOMY  4/21/2023    Procedure: CREATION, NEPHROSTOMY, PERCUTANEOUS with removal of existing nephrostomy tube;  Surgeon: Chirag Russ MD;  Location: [facility] OR 2ND FLR;  Service: Urology;;    PYELOSCOPY Right 10/27/2022    Procedure: PYELOSCOPY;  Surgeon: Chirag Russ MD;   Location: [facility] OR 1ST FLR;  Service: Urology;  Laterality: Right;    REPLACEMENT OF NEPHROSTOMY TUBE Right 10/27/2022    Procedure: REPLACEMENT, NEPHROSTOMY TUBE;  Surgeon: Chirag Russ MD;  Location: [facility] OR 1ST FLR;  Service: Urology;  Laterality: Right;    RETROPERITONEAL LYMPHADENECTOMY Right 9/17/2018    Procedure: LYMPHADENECTOMY, RETROPERITONEUM;  Surgeon: Sebas Reed MD;  Location: [facility] OR 2ND FLR;  Service: General;  Laterality: Right;  ILIAC    REVISION OF ANASTOMOSIS OF URETER TO ILEAL CONDUIT N/A 2/6/2024    Procedure: REVISION, ANASTOMOSIS, URETEROILEAL;  Surgeon: Leslie Balbuena MD;  Location: [facility] OR 2ND FLR;  Service: Urology;  Laterality: N/A;    URETEROSCOPIC REMOVAL OF URETERIC CALCULUS Right 10/27/2022    Procedure: REMOVAL, CALCULUS, URETER, URETEROSCOPIC;  Surgeon: Chirag Russ MD;  [facility];  Service: Urology;  Laterality: Right;    URETEROSCOPY Right 10/27/2022    Procedure: URETEROSCOPY-ANTEGRADE;  Surgeon: Chirag Russ MD;  [facility];  Service: Urology;  Laterality: Right;       Social History[3]    OBJECTIVE:     Vital Signs Range (Last 24H):         Significant Labs:  Lab Results   Component Value Date    WBC 8.17 07/01/2025    HGB 13.0 07/01/2025    HCT 42.3 07/01/2025     07/01/2025    CHOL 186 10/20/2022    TRIG 232 (H) 02/09/2024    HDL 77 (H) 10/20/2022    ALT 15 05/05/2025    AST 18 05/05/2025     05/05/2025    K 4.5 05/05/2025     05/05/2025    CREATININE 1.2 05/05/2025    BUN 19 05/05/2025    CO2 27 05/05/2025    TSH 0.527 12/02/2018    INR 1.0 08/07/2024    GLUF 94 09/20/2016    HGBA1C 4.9 02/04/2021       Diagnostic Studies: No relevant studies.    EKG:   Results for orders placed or performed during the hospital encounter of 07/01/25   EKG 12-lead    Collection Time: 07/01/25 11:04 AM   Result Value Ref Range    QRS Duration 80 ms    [facility] QTC Calculation 454 ms    Narrative    Test Reason :  Z01.818,I10,    Vent. Rate :  77 BPM     Atrial Rate :  77 BPM     P-R Int : 162 ms          QRS Dur :  80 ms      QT Int : 402 ms       P-R-T Axes :  76  74  71 degrees    QTcB Int : 454 ms    Normal sinus rhythm  Normal ECG  When compared with ECG of 24-Dec-2024 14:53,  No significant change was found  Confirmed by Chirag Pereira ([id number]) on 7/1/2025 12:16:33 PM    Referred By: CHELSEA MANN           Confirmed By: Chirag Pereira       2D ECHO:  TTE:  Results for orders placed or performed during the hospital encounter of 10/20/22   Echo   Result Value Ref Range    TDI SEPTAL 0.12 m/s    LV LATERAL E/E' RATIO 5.00 m/s    LV SEPTAL E/E' RATIO 5.83 m/s    IVC diameter 1.26 cm    Left Ventricular Outflow Tract Mean Velocity 0.65 cm/s    Left Ventricular Outflow Tract Mean Gradient 1.97 mmHg    AORTIC VALVE CUSP SEPERATION 1.70 cm    TDI LATERAL 0.14 m/s    PV PEAK VELOCITY 1.12 cm/s    LVIDd 3.58 3.5 - 6.0 cm    IVS 1.06 0.6 - 1.1 cm    PW 1.06 0.6 - 1.1 cm    Ao root annulus 2.34 cm    LVIDs 2.03 (A) 2.1 - 4.0 cm    FS 43 28 - 44 %    Sinus 1.74 cm    LV mass 116.51 g    RVDD 2.29 cm    Left Ventricle Relative Wall Thickness 0.59 cm    AV mean gradient 3 mmHg    AV valve area 1.30 cm2    AV Velocity Ratio 0.78     AV index (prosthetic) 0.81     MV valve area p 1/2 method 4.92 cm2    E/A ratio 1.01     Mean e' 0.13 m/s    E wave deceleration time 154.78 msec    IVRT 68.51 msec    LVOT diameter 1.43 cm    LVOT area 1.6 cm2    LVOT peak melissa 0.97 m/s    LVOT peak VTI 21.70 cm    Ao peak melissa 1.25 m/s    Ao VTI 26.7 cm    LVOT stroke volume 34.83 cm3    AV peak gradient 6 mmHg    E/E' ratio 5.38 m/s    MV Peak E Melissa 0.70 m/s    TR Max Melissa 1.77 m/s    MV stenosis pressure 1/2 time 44.69 ms    MV Peak A Melissa 0.69 m/s    LV Systolic Volume 13.26 mL    LV Diastolic Volume 53.67 mL    RA Major Axis 3.68 cm    Left Atrium Minor Axis 2.34 cm    Left Atrium Major Axis 4.47 cm    Triscuspid Valve Regurgitation Peak Gradient 13 mmHg    LA  Vol (MOD) 31.47 cm3    RA Width 2.79 cm    Right Atrial Pressure (from IVC) 3 mmHg    EF 70 %    TV resting pulmonary artery pressure 16 mmHg    Narrative    · The left ventricle is normal in size with mild concentric hypertrophy   and normal systolic function.  · The estimated ejection fraction is 70%.  · Normal left ventricular diastolic function.  · Normal right ventricular size with normal right ventricular systolic   function.  · Mild mitral regurgitation.  · Normal central venous pressure (3 mmHg).  · The estimated PA systolic pressure is 16 mmHg.          VALERIA:  No results found. However, due to the size of the patient record, not all encounters were searched. Please check Results Review for a complete set of results.    ASSESSMENT/PLAN:           Pre-op Assessment    I have reviewed the Patient Summary Reports.     I have reviewed the Nursing Notes. I have reviewed the NPO Status.   I have reviewed the Medications.     Review of Systems  Anesthesia Hx:  No problems with previous Anesthesia   History of prior surgery of interest to airway management or planning:          Denies Family Hx of Anesthesia complications.    Denies Personal Hx of Anesthesia complications.                    Hematology/Oncology:       -- Denies Anemia:                                  Cardiovascular:     Hypertension    Denies CAD.        Denies CHF.                             Hypertension         Pulmonary:   COPD  Denies Asthma.        Chronic Obstructive Pulmonary Disease (COPD):                      Renal/:  Chronic Renal Disease        Kidney Function/Disease             Hepatic/GI:    Hiatal Hernia, GERD Liver Disease,        Gerd    Hernia, Hiatal Hernia   Liver Disease        Neurological:   CVA Neuromuscular Disease,  Headaches Denies Seizures.     Dx of Headaches              CVA - Cerebrovasular Accident               Neuromuscular Disease   Endocrine:  Denies Diabetes.           Psych:  Psychiatric History                   Physical Exam  General: Well nourished, Alert, Cooperative and Oriented    Airway:  Mallampati: III / III  Mouth Opening: Small, but > 3cm  TM Distance: Normal  Tongue: Normal  Neck ROM: Normal ROM    Dental:  Intact    Chest/Lungs:  Normal Respiratory Rate    Heart:  Rate: Normal        Anesthesia Plan  Type of Anesthesia, risks & benefits discussed:    Anesthesia Type: Gen ETT  Intra-op Monitoring Plan: Standard ASA Monitors  Post Op Pain Control Plan: multimodal analgesia and IV/PO Opioids PRN  Induction:  IV  Airway Plan: Direct, Video and Fiberoptic, Post-Induction  Informed Consent: Informed consent signed with the Patient and all parties understand the risks and agree with anesthesia plan.  All questions answered.   ASA Score: 3  Day of Surgery Review of History & Physical: H&P Update referred to the surgeon/provider.  Anesthesia Plan Notes: Hx difficult intubation, see above for details    Ready For Surgery From Anesthesia Perspective.     .           [1]   Patient Active Problem List  Diagnosis    Cervical cancer    Osteoarthritis of back    Lower extremity pain, diffuse    Weakness of both lower extremities    Colon polyp    Internal hemorrhoids    Recurrent major depressive disorder, in partial remission    Fibromyalgia    Carpal tunnel syndrome on right    History of cervical cancer    Metastatic squamous cell carcinoma to lymph node    PTSD (post-traumatic stress disorder)    Neuropathy due to chemotherapeutic drug    Radiation cystitis    Vitamin D deficiency    History of DVT (deep vein thrombosis)    Presence of urostomy    Hard to intubate    Nephrostomy status    CKD (chronic kidney disease), stage III    Migraine without aura and without status migrainosus, not intractable    Arthritis    Emphysema of lung    Refusal of blood transfusions as patient is Evangel[employer]    Essential hypertension    Gastro-esophageal reflux disease with esophagitis    Allergic rhinitis due to pollen     Weakness    Difficult intravenous access    History of stroke    Atelectasis    Malfunction of nephrostomy tube    Pyelonephritis of right kidney    BMI 28.0-28.9,adult    Fatty liver    Nephrolithiasis   [2]   No current facility-administered medications on file prior to encounter.     Current Outpatient Medications on File Prior to Encounter   Medication Sig Dispense Refill    acetaminophen (TYLENOL) 500 MG tablet Take 1 tablet (500 mg total) by mouth every 6 (six) hours as needed for Pain. 20 tablet 0    ferrous sulfate (FEOSOL) 325 mg (65 mg iron) Tab tablet TAKE 1 TABLET BY MOUTH TWICE A DAY (Patient not taking: Reported on 2025) 60 tablet 2    loratadine (CLARITIN) 10 mg tablet Take 10 mg by mouth daily as needed for Allergies.      mirtazapine (REMERON) 30 MG tablet Take 1 tablet (30 mg total) by mouth nightly. 30 tablet 11    oxyCODONE-acetaminophen (PERCOCET) 5-325 mg per tablet Take 1 tablet by mouth every 8 (eight) hours as needed for Pain.      pantoprazole (PROTONIX) 40 MG tablet Take 1 tablet (40 mg total) by mouth once daily. for 14 days 14 tablet 0   [3]   Social History  Socioeconomic History    Marital status:      Spouse name: Hammad    Number of children: 2   Tobacco Use    Smoking status: Never    Smokeless tobacco: Never   Substance and Sexual Activity    Alcohol use: No     Alcohol/week: 0.0 standard drinks of alcohol    Drug use: No    Sexual activity: Yes     Partners: Male     Birth control/protection: None     Comment:  19 years since    Social History Narrative    , twin daughters (1  2018), disabled due to childhood stroke, [employer]     Social Drivers of Health     Financial Resource Strain: Low Risk  (2024)    Overall Financial Resource Strain (CARDIA)     Difficulty of Paying Living Expenses: Not very hard   Food Insecurity: No Food Insecurity (2024)    Hunger Vital Sign     Worried About Running Out of Food in the  Last Year: Never true     Ran Out of Food in the Last Year: Never true   Transportation Needs: No Transportation Needs (12/25/2024)    TRANSPORTATION NEEDS     Transportation : No   Physical Activity: Sufficiently Active (12/25/2024)    Exercise Vital Sign     Days of Exercise per Week: 7 days     Minutes of Exercise per Session: 30 min   Stress: No Stress Concern Present (12/25/2024)    [employer] [place] [employer] - Occupational Stress Questionnaire     Feeling of Stress : Not at all   Housing Stability: Unknown (12/25/2024)    Housing Stability Vital Sign     Unable to Pay for Housing in the Last Year: No     Homeless in the Last Year: No

## 2025-07-08 ENCOUNTER — HOSPITAL ENCOUNTER (OUTPATIENT)
Facility: HOSPITAL | Age: 60
LOS: 1 days | Discharge: HOME | End: 2025-07-08
Attending: UROLOGY | Admitting: UROLOGY
Payer: MEDICAID

## 2025-07-08 ENCOUNTER — ANESTHESIA (OUTPATIENT)
Dept: SURGERY | Facility: HOSPITAL | Age: 60
End: 2025-07-08
Payer: MEDICAID

## 2025-07-08 DIAGNOSIS — I10 ESSENTIAL HYPERTENSION: ICD-10-CM

## 2025-07-08 DIAGNOSIS — N13.5 URETERAL STRICTURE: Primary | ICD-10-CM

## 2025-07-08 DIAGNOSIS — N20.0 NEPHROLITHIASIS: ICD-10-CM

## 2025-07-08 DIAGNOSIS — Z01.818 PREOP TESTING: ICD-10-CM

## 2025-07-08 PROBLEM — N20.1 URETERAL CALCULUS: Status: ACTIVE | Noted: 2025-07-08

## 2025-07-08 PROCEDURE — C1758 CATHETER, URETERAL: HCPCS | Performed by: UROLOGY

## 2025-07-08 PROCEDURE — 63600175 PHARM REV CODE 636 W HCPCS

## 2025-07-08 PROCEDURE — 50551 KIDNEY ENDOSCOPY: CPT | Mod: RT,,, | Performed by: UROLOGY

## 2025-07-08 PROCEDURE — 71000044 HC DOSC ROUTINE RECOVERY FIRST HOUR: Performed by: UROLOGY

## 2025-07-08 PROCEDURE — 37000008 HC ANESTHESIA 1ST 15 MINUTES: Performed by: UROLOGY

## 2025-07-08 PROCEDURE — 74425 UROGRAPHY ANTEGRADE RS&I: CPT | Mod: 26,,, | Performed by: UROLOGY

## 2025-07-08 PROCEDURE — 36000706: Performed by: UROLOGY

## 2025-07-08 PROCEDURE — C2617 STENT, NON-COR, TEM W/O DEL: HCPCS | Performed by: UROLOGY

## 2025-07-08 PROCEDURE — D9220A PRA ANESTHESIA: Mod: ,,, | Performed by: ANESTHESIOLOGY

## 2025-07-08 PROCEDURE — 71000015 HC POSTOP RECOV 1ST HR: Performed by: UROLOGY

## 2025-07-08 PROCEDURE — 25000003 PHARM REV CODE 250

## 2025-07-08 PROCEDURE — 27201423 OPTIME MED/SURG SUP & DEVICES STERILE SUPPLY: Performed by: UROLOGY

## 2025-07-08 PROCEDURE — C1747 OPTIME ENDOSCOPE, SINGLE, URINARY TRACT: HCPCS | Performed by: UROLOGY

## 2025-07-08 PROCEDURE — C1769 GUIDE WIRE: HCPCS | Performed by: UROLOGY

## 2025-07-08 PROCEDURE — 37000009 HC ANESTHESIA EA ADD 15 MINS: Performed by: UROLOGY

## 2025-07-08 PROCEDURE — 25500020 PHARM REV CODE 255: Performed by: UROLOGY

## 2025-07-08 PROCEDURE — 52332 CYSTOSCOPY AND TREATMENT: CPT | Mod: 51,RT,, | Performed by: UROLOGY

## 2025-07-08 PROCEDURE — 36000707: Performed by: UROLOGY

## 2025-07-08 PROCEDURE — 50690 INJECTION FOR URETER X-RAY: CPT | Mod: 51,,, | Performed by: UROLOGY

## 2025-07-08 DEVICE — NEPHROURETERAL STENT SYSTEM
Type: IMPLANTABLE DEVICE | Site: URETER | Status: FUNCTIONAL
Brand: PERCUFLEX™ NEPHROURETERAL STENT

## 2025-07-08 RX ORDER — OXYCODONE HYDROCHLORIDE 5 MG/1
5 TABLET ORAL
Status: DISCONTINUED | OUTPATIENT
Start: 2025-07-08 | End: 2025-07-08 | Stop reason: HOSPADM

## 2025-07-08 RX ORDER — PHENYLEPHRINE HCL IN 0.9% NACL 1 MG/10 ML
SYRINGE (ML) INTRAVENOUS
Status: DISCONTINUED | OUTPATIENT
Start: 2025-07-08 | End: 2025-07-08

## 2025-07-08 RX ORDER — ACETAMINOPHEN 500 MG
1000 TABLET ORAL
Status: COMPLETED | OUTPATIENT
Start: 2025-07-08 | End: 2025-07-08

## 2025-07-08 RX ORDER — MIDAZOLAM HYDROCHLORIDE 1 MG/ML
INJECTION INTRAMUSCULAR; INTRAVENOUS
Status: DISCONTINUED | OUTPATIENT
Start: 2025-07-08 | End: 2025-07-08

## 2025-07-08 RX ORDER — ACETAMINOPHEN 500 MG
1000 TABLET ORAL
Status: DISCONTINUED | OUTPATIENT
Start: 2025-07-08 | End: 2025-07-08 | Stop reason: HOSPADM

## 2025-07-08 RX ORDER — LIDOCAINE HYDROCHLORIDE 10 MG/ML
1 INJECTION, SOLUTION EPIDURAL; INFILTRATION; INTRACAUDAL; PERINEURAL ONCE AS NEEDED
Status: DISCONTINUED | OUTPATIENT
Start: 2025-07-08 | End: 2025-07-08 | Stop reason: HOSPADM

## 2025-07-08 RX ORDER — ROCURONIUM BROMIDE 10 MG/ML
INJECTION, SOLUTION INTRAVENOUS
Status: DISCONTINUED | OUTPATIENT
Start: 2025-07-08 | End: 2025-07-08

## 2025-07-08 RX ORDER — FENTANYL CITRATE 50 UG/ML
INJECTION, SOLUTION INTRAMUSCULAR; INTRAVENOUS
Status: DISCONTINUED | OUTPATIENT
Start: 2025-07-08 | End: 2025-07-08

## 2025-07-08 RX ORDER — SODIUM CHLORIDE 9 MG/ML
INJECTION, SOLUTION INTRAVENOUS CONTINUOUS
Status: DISCONTINUED | OUTPATIENT
Start: 2025-07-08 | End: 2025-07-08 | Stop reason: HOSPADM

## 2025-07-08 RX ORDER — PREGABALIN 75 MG/1
75 CAPSULE ORAL
Status: COMPLETED | OUTPATIENT
Start: 2025-07-08 | End: 2025-07-08

## 2025-07-08 RX ORDER — DEXMEDETOMIDINE HYDROCHLORIDE 100 UG/ML
INJECTION, SOLUTION INTRAVENOUS
Status: DISCONTINUED | OUTPATIENT
Start: 2025-07-08 | End: 2025-07-08

## 2025-07-08 RX ORDER — KETOROLAC TROMETHAMINE 15 MG/ML
15 INJECTION, SOLUTION INTRAMUSCULAR; INTRAVENOUS
Status: DISCONTINUED | OUTPATIENT
Start: 2025-07-08 | End: 2025-07-08 | Stop reason: HOSPADM

## 2025-07-08 RX ORDER — GLUCAGON 1 MG
1 KIT INJECTION
Status: DISCONTINUED | OUTPATIENT
Start: 2025-07-08 | End: 2025-07-08 | Stop reason: HOSPADM

## 2025-07-08 RX ORDER — LIDOCAINE HYDROCHLORIDE 20 MG/ML
INJECTION, SOLUTION EPIDURAL; INFILTRATION; INTRACAUDAL; PERINEURAL
Status: DISCONTINUED | OUTPATIENT
Start: 2025-07-08 | End: 2025-07-08

## 2025-07-08 RX ORDER — LIDOCAINE HYDROCHLORIDE 10 MG/ML
1 INJECTION, SOLUTION EPIDURAL; INFILTRATION; INTRACAUDAL; PERINEURAL ONCE
Status: DISCONTINUED | OUTPATIENT
Start: 2025-07-08 | End: 2025-07-08 | Stop reason: HOSPADM

## 2025-07-08 RX ORDER — ONDANSETRON HYDROCHLORIDE 2 MG/ML
4 INJECTION, SOLUTION INTRAVENOUS DAILY PRN
Status: DISCONTINUED | OUTPATIENT
Start: 2025-07-08 | End: 2025-07-08 | Stop reason: HOSPADM

## 2025-07-08 RX ORDER — ONDANSETRON HYDROCHLORIDE 2 MG/ML
INJECTION, SOLUTION INTRAVENOUS
Status: DISCONTINUED | OUTPATIENT
Start: 2025-07-08 | End: 2025-07-08

## 2025-07-08 RX ORDER — SODIUM CHLORIDE 0.9 % (FLUSH) 0.9 %
10 SYRINGE (ML) INJECTION
Status: DISCONTINUED | OUTPATIENT
Start: 2025-07-08 | End: 2025-07-08 | Stop reason: HOSPADM

## 2025-07-08 RX ORDER — PROPOFOL 10 MG/ML
VIAL (ML) INTRAVENOUS
Status: DISCONTINUED | OUTPATIENT
Start: 2025-07-08 | End: 2025-07-08

## 2025-07-08 RX ORDER — HYDROMORPHONE HYDROCHLORIDE 1 MG/ML
0.2 INJECTION, SOLUTION INTRAMUSCULAR; INTRAVENOUS; SUBCUTANEOUS EVERY 5 MIN PRN
Status: DISCONTINUED | OUTPATIENT
Start: 2025-07-08 | End: 2025-07-08 | Stop reason: HOSPADM

## 2025-07-08 RX ADMIN — ROCURONIUM BROMIDE 50 MG: 10 INJECTION, SOLUTION INTRAVENOUS at 09:07

## 2025-07-08 RX ADMIN — ROCURONIUM BROMIDE 50 MG: 10 INJECTION, SOLUTION INTRAVENOUS at 07:07

## 2025-07-08 RX ADMIN — KETOROLAC TROMETHAMINE 15 MG: 15 INJECTION INTRAMUSCULAR at 05:07

## 2025-07-08 RX ADMIN — GENTAMICIN SULFATE 115.2 MG: 40 INJECTION, SOLUTION INTRAMUSCULAR; INTRAVENOUS at 06:07

## 2025-07-08 RX ADMIN — PHENYLEPHRINE HYDROCHLORIDE 0.25 MCG/KG/MIN: 10 INJECTION INTRAVENOUS at 09:07

## 2025-07-08 RX ADMIN — LIDOCAINE HYDROCHLORIDE 10 MG: 20 INJECTION, SOLUTION EPIDURAL; INFILTRATION; INTRACAUDAL; PERINEURAL at 07:07

## 2025-07-08 RX ADMIN — AMPICILLIN 1 G: 1 INJECTION, POWDER, FOR SOLUTION INTRAMUSCULAR; INTRAVENOUS at 07:07

## 2025-07-08 RX ADMIN — GENTAMICIN SULFATE 115.2 MG: 40 INJECTION, SOLUTION INTRAMUSCULAR; INTRAVENOUS at 07:07

## 2025-07-08 RX ADMIN — AMPICILLIN 1 G: 1 INJECTION, POWDER, FOR SOLUTION INTRAMUSCULAR; INTRAVENOUS at 09:07

## 2025-07-08 RX ADMIN — Medication 100 MCG: at 08:07

## 2025-07-08 RX ADMIN — DEXMEDETOMIDINE 12 MCG: 100 INJECTION, SOLUTION, CONCENTRATE INTRAVENOUS at 08:07

## 2025-07-08 RX ADMIN — ONDANSETRON 4 MG: 2 INJECTION INTRAMUSCULAR; INTRAVENOUS at 09:07

## 2025-07-08 RX ADMIN — ROCURONIUM BROMIDE 50 MG: 10 INJECTION, SOLUTION INTRAVENOUS at 08:07

## 2025-07-08 RX ADMIN — PREGABALIN 75 MG: 75 CAPSULE ORAL at 05:07

## 2025-07-08 RX ADMIN — SODIUM CHLORIDE: 9 INJECTION, SOLUTION INTRAVENOUS at 05:07

## 2025-07-08 RX ADMIN — FENTANYL CITRATE 100 MCG: 50 INJECTION, SOLUTION INTRAMUSCULAR; INTRAVENOUS at 07:07

## 2025-07-08 RX ADMIN — MIDAZOLAM HYDROCHLORIDE 2 MG: 2 INJECTION, SOLUTION INTRAMUSCULAR; INTRAVENOUS at 07:07

## 2025-07-08 RX ADMIN — ACETAMINOPHEN 1000 MG: 500 TABLET ORAL at 05:07

## 2025-07-08 RX ADMIN — SUGAMMADEX 300 MG: 100 INJECTION, SOLUTION INTRAVENOUS at 09:07

## 2025-07-08 RX ADMIN — PROPOFOL 200 MG: 10 INJECTION, EMULSION INTRAVENOUS at 07:07

## 2025-07-08 NOTE — TRANSFER OF CARE
"Anesthesia Transfer of Care Note    Patient: Edita Riley    Procedure(s) Performed: Procedure(s):  LOOPOGRAM  URETEROSCOPY, ANTEGRADE    Patient location: PACU    Anesthesia Type: general    Transport from OR: Transported from OR on 2-3 L/min O2 by NC with adequate spontaneous ventilation    Post pain: adequate analgesia    Post assessment: no apparent anesthetic complications    Post vital signs: stable    Level of consciousness: awake and alert    Nausea/Vomiting: no nausea/vomiting    Complications: none    Transfer of care protocol was followed      Last vitals: Visit Vitals  /60   Pulse 66   Temp 36.3 °C (97.3 °F) (Temporal)   Resp 16   Ht 5' 9" (1.753 m)   Wt 92.5 kg (203 lb 14.8 oz)   LMP 06/08/2014 (Approximate)   SpO2 100%   Breastfeeding No   BMI 30.11 kg/m²     "

## 2025-07-08 NOTE — OP NOTE
[facility] - Surgery ([facility])  Urology Department  Operative Note    Date of Procedure: 7/8/2025     Pre-Operative Diagnosis:   S/P ileal conduit [Z93.6]  Ureteral calculus [N20.1]    Post-Operative Diagnosis: same    Procedure(s) Performed:   Right antegrade nephroureteral stent placement  Right nephrostomy tube removal  Right antegrade nephrostogram  Right antegrade ureteroscopy  Loopogram  Looposcopy    Specimen(s): none    Primary: Leslie Balbuena MD  Resident - Assisting: Berto Goddard MD; Rufus Dunn MD    Circulator: Ilda Pena RN  Relief Circulator: Madelin Rivera RN  Scrub Person: Minna Parker     Anesthesia: General endotracheal anesthesia    Indications: Edita Riley is a 60 y.o. female with right upper tract obstruction from likely right ureteral stone versus right ureterocolonic stricture.  She  has been extensively counseled on the options for stones and understands the risks, benefits, and alternatives. She has elected to proceed with evaluation and management.    Findings:  Loopogram with good left upper tract reflux, though without reflux into the right upper tract.  Antegrade nephrostogram with contrast unable to be initially passed from the right ureter into the colonic conduit.   2 cm, right ureterocolonic anastomotic stricture, able to be passively dilated with two wires and a flexible ureteroscope - following dilation, able to pass contrast through the stricture into the conduit  Right nephroureteral stent placed in standard fashion.  Coil was in the renal pelvis and distal coil was confirmed in the colon conduit    Estimated Blood Loss: min    Drains:   8 x 24 nephroureteral stent    Procedure in detail:  After the risks, benefits and possible complications of the procedure were explained, the patient elected to undergo the above procedures and informed consent was obtained. The patient was taken to the OR and placed under anesthesia. Pre-operative antibiotics  were administered. Time out was performed.  SCDs were applied and working prior to the procedure.    Our attention was first turned to the loopogram while the patient was in the supine position. An 18Fr red rubbed was inserted into the patient's stoma and contrast mixed with normal sialine was injected under fluoro which revealed a patent conduit and left upper tract reflux without right upper tract reflux. The nino was removed and the patient was then positioned in the right flank position.     Our attention was turned to the patient's right nephrostomy tube which was already in place. An antegrade nephrostogram was performed using fluoroscopy to delineate the collecting system. The tube was then cut with scissors. A motion wire was inserted through the nephrostomy tube and advanced to the level of the conduit. This was confirmed on fluoroscopy. The nephrostomy tube was then removed keeping the wire in place. A dual lumen access catheter was inserted over the wire. A stiff wire was then inserted through the second lumen and advanced to the level of the conduit. The dual lumen was then removed keeping both wires in place.    The flexible ureteroscope was then inserted into the collecting system over the stiff wire. The collecting system was visualized. The UPJ was identified. We were able to pass the scope down to the level of the ureterocolonic anastomosis, a stricture was identified and measured 2cm, though the scope was able to be passed through the stricture with passive dilation and enter the conduit. An antegrade nephrostogram was then again performed and contrast was seen passing through the area of the stricture. A second wire was replaced and the scope was then removed. No stones were identified.     We then inserted a 8 x 24 nephreoureteral stent over the stiff wire into the right kidney and down into the conduit. We saw an appropriate coil in the conduit and kidney. This was confirmed with additional  injection of contrast. All wires were then removed. The stent was then secured to the skin using a 2-0 nylon.      A sterile compressive dressing was placed as well as a new ostomy bag. The patient was then transferred back to PACU in stable condition.     Disposition:  The patient will follow-up with Dr. Balbuena in the near future to discuss right ureteral reimplant into the colonic conduit.    MD ISAAK Sanchez was present for the entire case and agree with the above note.

## 2025-07-08 NOTE — ANESTHESIA PROCEDURE NOTES
Intubation    Date/Time: 7/8/2025 7:16 AM    Performed by: Benson Najera MD  Authorized by: Micah Watson MD    Intubation:     Induction:  Intravenous    Intubated:  Postinduction    Mask Ventilation:  Easy with oral airway    Attempts:  1    Attempted By:  Resident anesthesiologist    Method of Intubation:  Fiberoptic    Blade:  Other (see comments)    Laryngeal View Grade: Grade I - full view of cords      Difficult Airway Encountered?: No      Complications:  None    Airway Device:  Oral endotracheal tube    Airway Device Size:  7.0    Style/Cuff Inflation:  Cuffed (inflated to minimal occlusive pressure)    Tube secured:  22    Secured at:  The lips    Placement Verified By:  Fiber optic visualization and Capnometry    Complicating Factors:  Small mouth and short neck    Findings Post-Intubation:  BS equal bilateral

## 2025-07-08 NOTE — PLAN OF CARE
Surgical team and anesthesia team has rounded. IV ABX rec'd. Gentamycin infusing at this time. Pt denies needs, call light in reach, stretcher low and locked, pt watching television.

## 2025-07-08 NOTE — INTERVAL H&P NOTE
The patient has been examined and the H&P has been reviewed:    I concur with the findings and no changes have occurred since H&P was written.    Procedure risks, benefits and alternative options discussed and understood by patient/family.    All benefits, risks, and alternatives were explained to the patient in great detail. All patient questions were addressed and the patient voiced clear understanding of our discussion. The patient has elected to undergo right antegrade URS, looposcopy/loopogram/stone extraction.    The patient denies all recent n/v/d, fevers, chills, and illnesses.     No recent AC/AP. Has been taking Omnicef as prescribed.       There are no hospital problems to display for this patient.    Patient seen in holding.  No changes in clinical condition.  Proceed with planned procedure.

## 2025-07-08 NOTE — DISCHARGE SUMMARY
[facility] - Surgery (2nd Fl)  Discharge Note  Short Stay    Procedure(s) (LRB):  LOOPOGRAM,LOOPOSCOPY  ANTERIOR NEPHROURETEROGRAM  ANTERIOR NEPHROURETEROSCOPY  ANTERIOR PLACEMENT OF NEPHROURETERO CATHETER (N/A)      OUTCOME: Patient tolerated treatment/procedure well without complication and is now ready for discharge.    DISPOSITION: Home or Self Care    FINAL DIAGNOSIS:  Ureteral stricture    FOLLOWUP: In clinic    DISCHARGE INSTRUCTIONS:    Discharge Procedure Orders   CBC Without Differential   Standing Status: Future Number of Occurrences: 1 Standing Exp. Date: 07/14/26     Lifting restrictions   Order Comments: Do not drive while taking pain medications. No strenuous activity or lifting greater than 10 pounds for 4 weeks.     No dressing needed   Order Comments: Do not soak incisions in tub or bath and do not scrub incisions. You may shower over incisions. If you have surgical glue in place, the glue will come off over the next few weeks.     Notify your health care provider if you experience any of the following:  temperature >100.4     Notify your health care provider if you experience any of the following:  persistent nausea and vomiting or diarrhea     Notify your health care provider if you experience any of the following:  severe uncontrolled pain     Notify your health care provider if you experience any of the following:  redness, tenderness, or signs of infection (pain, swelling, redness, odor or green/yellow discharge around incision site)     Notify your health care provider if you experience any of the following:  difficulty breathing or increased cough     Notify your health care provider if you experience any of the following:  persistent dizziness, light-headedness, or visual disturbances     EKG 12-lead   Standing Status: Future Number of Occurrences: 1 Standing Exp. Date: 05/15/26     Type & Screen   Standing Status: Future Number of Occurrences: 1 Standing Exp. Date: 07/14/26        TIME SPENT  ON DISCHARGE: 15 minutes    As above

## 2025-07-08 NOTE — BRIEF OP NOTE
[facility] - Surgery (2nd Fl)  Brief Operative Note    Surgery Date: 7/8/2025     Surgeons and Role:     * Leslie Balbuena MD - Primary     * Rufus Dunn MD - Resident - Assisting     * Berto Goddard MD - Resident - Assisting        Pre-op Diagnosis:  S/P ileal conduit [Z93.6]  Ureteral calculus [N20.1]    Post-op Diagnosis:  Post-Op Diagnosis Codes:     * S/P ileal conduit [Z93.6]     * Ureteral calculus [N20.1]    Procedure(s) (LRB):  LOOPOGRAM,LOOPOSCOPY  ANTERIOR NEPHROURETEROGRAM  ANTERIOR NEPHROURETEROSCOPY  ANTERIOR PLACEMENT OF NEPHROURETERO CATHETER (N/A)    Anesthesia: General    Operative Findings: right ureterocolonic anastomotic stricture, 2cm in length.     Estimated Blood Loss: * No values recorded between 7/8/2025  7:09 AM and 7/8/2025  9:58 AM *         Specimens:   Specimen (24h ago, onward)      None          I was present for the entire case and agree with the above note.    * No specimens in log *        Discharge Note    OUTCOME: Patient tolerated treatment/procedure well without complication and is now ready for discharge.    DISPOSITION: Home or Self Care    FINAL DIAGNOSIS:  Ureteral stricture    FOLLOWUP: In clinic    DISCHARGE INSTRUCTIONS:    Discharge Procedure Orders   CBC Without Differential   Standing Status: Future Number of Occurrences: 1 Standing Exp. Date: 07/14/26     Lifting restrictions   Order Comments: Do not drive while taking pain medications. No strenuous activity or lifting greater than 10 pounds for 4 weeks.     No dressing needed   Order Comments: Do not soak incisions in tub or bath and do not scrub incisions. You may shower over incisions. If you have surgical glue in place, the glue will come off over the next few weeks.     Notify your health care provider if you experience any of the following:  temperature >100.4     Notify your health care provider if you experience any of the following:  persistent nausea and vomiting or diarrhea     Notify your health  care provider if you experience any of the following:  severe uncontrolled pain     Notify your health care provider if you experience any of the following:  redness, tenderness, or signs of infection (pain, swelling, redness, odor or green/yellow discharge around incision site)     Notify your health care provider if you experience any of the following:  difficulty breathing or increased cough     Notify your health care provider if you experience any of the following:  persistent dizziness, light-headedness, or visual disturbances     EKG 12-lead   Standing Status: Future Number of Occurrences: 1 Standing Exp. Date: 05/15/26     Type & Screen   Standing Status: Future Number of Occurrences: 1 Standing Exp. Date: 07/14/26     As above.

## 2025-07-08 NOTE — PROGRESS NOTES
Discharge instructions reviewed w/pt, verbalized understanding. Pt in NADN. No complaints at this time. Tolerated liquids w/ no issues. To be d/c'd home w/ . Little and neph tube maintained.

## 2025-07-08 NOTE — PLAN OF CARE
Unable to reach [facility] assigned pharmacist, [facility] charge nurse notified. Message sent via MAR re need for ABX with instructions to call [facility] RN if additional information needed, Pt weight has been entered into computer.No response to message as of this time.  Blood declination/LTD consent form labelled and left at bedside for MD to discuss with patient, accordingly, no T&S drawn.

## 2025-07-11 VITALS
TEMPERATURE: 98 F | OXYGEN SATURATION: 98 % | BODY MASS INDEX: 30.21 KG/M2 | SYSTOLIC BLOOD PRESSURE: 140 MMHG | HEIGHT: 69 IN | DIASTOLIC BLOOD PRESSURE: 67 MMHG | HEART RATE: 77 BPM | RESPIRATION RATE: 12 BRPM | WEIGHT: 203.94 LBS

## 2025-07-13 NOTE — ANESTHESIA POSTPROCEDURE EVALUATION
Anesthesia Post Evaluation    Patient: Edita WelchCox Monettdelicia    Procedure(s) Performed: Procedure(s) (LRB):  LOOPOGRAM,LOOPOSCOPY  ANTERIOR NEPHROURETEROGRAM  ANTERIOR NEPHROURETEROSCOPY  ANTERIOR PLACEMENT OF NEPHROURETERO CATHETER (N/A)    Final Anesthesia Type: general      Patient location during evaluation: PACU  Patient participation: Yes- Able to Participate  Level of consciousness: awake  Post-procedure vital signs: reviewed and stable  Pain management: adequate  Airway patency: patent    PONV status at discharge: No PONV  Anesthetic complications: no      Cardiovascular status: blood pressure returned to baseline  Respiratory status: unassisted  Hydration status: euvolemic  Follow-up not needed.              Vitals Value Taken Time   /67 07/08/25 10:55   Temp 36.5 °C (97.7 °F) 07/08/25 10:55   Pulse 70 07/08/25 10:59   Resp 18 07/08/25 10:59   SpO2 100 % 07/08/25 10:59   Vitals shown include unfiled device data.      No case tracking events are documented in the log.      Pain/Caitie Score: No data recorded

## 2025-07-18 ENCOUNTER — OFFICE VISIT (OUTPATIENT)
Dept: UROLOGY | Facility: CLINIC | Age: 60
End: 2025-07-18
Payer: MEDICAID

## 2025-07-18 ENCOUNTER — HOSPITAL ENCOUNTER (EMERGENCY)
Facility: HOSPITAL | Age: 60
Discharge: HOME | End: 2025-07-18
Attending: EMERGENCY MEDICINE
Payer: MEDICAID

## 2025-07-18 VITALS
WEIGHT: 203 LBS | RESPIRATION RATE: 18 BRPM | TEMPERATURE: 98 F | SYSTOLIC BLOOD PRESSURE: 129 MMHG | DIASTOLIC BLOOD PRESSURE: 65 MMHG | OXYGEN SATURATION: 96 % | BODY MASS INDEX: 30.07 KG/M2 | HEART RATE: 86 BPM | HEIGHT: 69 IN

## 2025-07-18 VITALS
SYSTOLIC BLOOD PRESSURE: 143 MMHG | BODY MASS INDEX: 30.08 KG/M2 | DIASTOLIC BLOOD PRESSURE: 79 MMHG | HEART RATE: 91 BPM | WEIGHT: 203.06 LBS | HEIGHT: 69 IN

## 2025-07-18 DIAGNOSIS — N13.5 URETERAL STRICTURE: Primary | ICD-10-CM

## 2025-07-18 DIAGNOSIS — Z55.8: Primary | ICD-10-CM

## 2025-07-18 PROCEDURE — 99999 PR PBB SHADOW E&M-EST. PATIENT-LVL III: CPT | Mod: PBBFAC,,, | Performed by: UROLOGY

## 2025-07-18 PROCEDURE — 99213 OFFICE O/P EST LOW 20 MIN: CPT | Mod: PBBFAC | Performed by: UROLOGY

## 2025-07-18 PROCEDURE — 99281 EMR DPT VST MAYX REQ PHY/QHP: CPT | Mod: 27

## 2025-07-18 SDOH — SOCIAL DETERMINANTS OF HEALTH (SDOH): OTHER PROBLEMS RELATED TO EDUCATION AND LITERACY: Z55.8

## 2025-07-18 NOTE — ED TRIAGE NOTES
"Edita Riley, a 60 y.o. female presents to the ED w/ complaint of "black thing" in her urostomy and noticed it is protruding more than normal. Upon assessment, stoma looks pink, intact and well defined. No signs of inflammation or irritation. Normal urine color in stoma bag.     Triage note:  Chief Complaint   Patient presents with    Wound Check     Pt states the stoma of her colostomy site appears to be protruding a little more than normal since last night. Pt states some pain to area. Pt state has had colostomy for "years". Pt states first time she has had issues with it     Review of patient's allergies indicates:   Allergen Reactions    Bee sting [allergen ext-venom-honey bee]      Rash      Grass pollen-bermuda, standard      rash     Past Medical History:   Diagnosis Date    Abnormal mammogram 08/25/2020    Anemia due to chronic blood loss     Anxiety     Bilateral ureteral obstruction 09/11/2020    obstructive uropathy now s/p transverse colon conduit, s/p right  Nephrostomy tube, s/p Ileostomy,    Cervical cancer 2014    s/p XRT & Bilateral Ureteral strictures with obstructive uropathy    Chronic back pain     CKD (chronic kidney disease)     Colon polyp 09/14/2015    Diarrhea due to malabsorption 11/14/2018    Difficult intubation     DVT of lower extremity, bilateral 11/04/2020    Emphysema of lung 04/10/2023    Family history of colon cancer 07/28/2020    Fibromyalgia     Folate deficiency     Fungemia 09/27/2020    GERD (gastroesophageal reflux disease)     Hard to intubate 04/20/2021    Hemifacial spasm 09/16/2015    Hiatal hernia 2014    Hypertension     Hyponatremia 09/10/2023    Impaired functional mobility, balance, gait, and endurance 07/24/2015    Lactose intolerance     Major depressive disorder, recurrent episode, moderate 06/02/2023    History of suicidal ideation    Metastatic squamous cell carcinoma to lymph node 10/11/2018    Nephrostomy complication 10/26/2023    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 Bx were negative.     Seizure-like activity 12/02/2018    Sleep stage dysfunction     Noted on PSG 06/2017; negative for obstructive sleep apnea     Small bowel obstruction 08/19/2023    Stroke     Urinary tract infection associated with nephrostomy catheter 06/11/2020    recurrent MDR UTI/pyelonephritis

## 2025-07-18 NOTE — DISCHARGE INSTRUCTIONS
Please follow up with the your urologist in the morning.  Please return to the emergency department if the ostomy stopped straining urine, if you develop fever, chills, or any other symptoms that concern you.

## 2025-07-18 NOTE — ED PROVIDER NOTES
"Encounter Date: 7/18/2025       History     Chief Complaint   Patient presents with    Wound Check     Pt states the stoma of her colostomy site appears to be protruding a little more than normal since last night. Pt states some pain to area. Pt state has had colostomy for "years". Pt states first time she has had issues with it     The history is provided by the patient.     Review of patient's allergies indicates:   Allergen Reactions    Bee sting [allergen ext-venom-honey bee]      Rash      Grass pollen-bermuda, standard      rash     Past Medical History:   Diagnosis Date    Abnormal mammogram 08/25/2020    Anemia due to chronic blood loss     Anxiety     Bilateral ureteral obstruction 09/11/2020    obstructive uropathy now s/p transverse colon conduit, s/p right  Nephrostomy tube, s/p Ileostomy,    Cervical cancer 2014    s/p XRT & Bilateral Ureteral strictures with obstructive uropathy    Chronic back pain     CKD (chronic kidney disease)     Colon polyp 09/14/2015    Diarrhea due to malabsorption 11/14/2018    Difficult intubation     DVT of lower extremity, bilateral 11/04/2020    Emphysema of lung 04/10/2023    Family history of colon cancer 07/28/2020    Fibromyalgia     Folate deficiency     Fungemia 09/27/2020    GERD (gastroesophageal reflux disease)     Hard to intubate 04/20/2021    Hemifacial spasm 09/16/2015    Hiatal hernia 2014    Hypertension     Hyponatremia 09/10/2023    Impaired functional mobility, balance, gait, and endurance 07/24/2015    Lactose intolerance     Major depressive disorder, recurrent episode, moderate 06/02/2023    History of suicidal ideation    Metastatic squamous cell carcinoma to lymph node 10/11/2018    Nephrostomy complication 10/26/2023    Neuropathy due to chemotherapeutic drug 11/14/2018    Osteoarthritis of back     Peritonitis 09/22/2020    QT prolongation 04/16/2021    Refusal of blood transfusions as patient is Shinto     Ranjit's ring 09/14/2015    " Seen on outside EGD 05/2014, underwent esophageal dilatation. Bx were negative.     Seizure-like activity 12/02/2018    Sleep stage dysfunction     Noted on PSG 06/2017; negative for obstructive sleep apnea     Small bowel obstruction 08/19/2023    Stroke     Urinary tract infection associated with nephrostomy catheter 06/11/2020    recurrent MDR UTI/pyelonephritis     Past Surgical History:   Procedure Laterality Date    ANASTOMOSIS OF INTESTINE N/A 2/6/2024    Procedure: ANASTOMOSIS, INTESTINE - Ileocolic anastomosis;  Surgeon: Hammad Reynolds MD;  [facility];  Service: Colon and Rectal;  Laterality: N/A;    ANTEGRADE NEPHROSTOGRAPHY Bilateral 9/28/2020    Procedure: Nephrostogram - antegrade;  Surgeon: Leslie Balbuena MD;  Location: [facility] OR 1ST FLR;  Service: Urology;  Laterality: Bilateral;    ANTEGRADE NEPHROSTOGRAPHY Left 4/20/2021    Procedure: NEPHROSTOGRAM, ANTEGRADE;  Surgeon: Leslie Balbuena MD;  Location: [facility] OR 1ST FLR;  Service: Urology;  Laterality: Left;    ANTEGRADE NEPHROSTOGRAPHY Right 10/27/2022    Procedure: NEPHROSTOGRAM, ANTEGRADE;  Surgeon: Chirag Russ MD;  Location: [facility] OR 1ST FLR;  Service: Urology;  Laterality: Right;    ANTEGRADE NEPHROSTOGRAPHY Right 4/21/2023    Procedure: NEPHROSTOGRAM, ANTEGRADE;  Surgeon: Chirag Russ MD;  Location: [facility] OR 2ND FLR;  Service: Urology;  Laterality: Right;    ANTEGRADE NEPHROSTOGRAPHY Left 6/6/2023    Procedure: Nephrostogram - antegrade;  Surgeon: Leslie Balbuena MD;  Location: [facility] OR 1ST FLR;  Service: Urology;  Laterality: Left;    ANTEGRADE NEPHROSTOGRAPHY  7/8/2025    Procedure: ANTERIOR NEPHROURETEROGRAM;  Surgeon: Leslie Balbuena MD;  Location: [facility] OR 2ND FLR;  Service: Urology;;    BILATERAL OOPHORECTOMY  2015    CHOLECYSTECTOMY  11/09/2016    Done at [facility], path showed chronic cholecystitis and gallstones    cold knife conization  2014    COLECTOMY, RIGHT  9/17/2020    Procedure: COLECTOMY, RIGHT  Extended;  Surgeon: Hammad Reynolds MD;  Location: [facility] OR 2ND FLR;  Service: Colon and Rectal;;    COLONOSCOPY  2014    [facility]    COLONOSCOPY N/A 5/18/2018    Procedure: COLONOSCOPY;  Surgeon: Arden Gutiérrez MD;  Location: [facility] (4TH FLR);  Service: Endoscopy;  Laterality: N/A;    COLONOSCOPY N/A 7/28/2020    Procedure: COLONOSCOPY;  Surgeon: Hammad Reynolds MD;  Location: [facility] (4TH FLR);  Service: Colon and Rectal;  Laterality: N/A;  covid test [place] 7/25    CYSTOSCOPY WITH URETEROSCOPY, RETROGRADE PYELOGRAPHY, AND INSERTION OF STENT Bilateral 3/21/2020    Procedure: CYSTOSCOPY, WITH RETROGRADE PYELOGRAM,;  Surgeon: Leslie Balbuena MD;  Location: [facility] OR 1ST FLR;  Service: Urology;  Laterality: Bilateral;    DILATION OF NEPHROSTOMY TRACT Right 10/27/2022    Procedure: DILATION, NEPHROSTOMY TRACT;  Surgeon: Chirag Russ MD;  Location: [facility] OR 1ST FLR;  Service: Urology;  Laterality: Right;    ESOPHAGOGASTRODUODENOSCOPY  2014    ESOPHAGOGASTRODUODENOSCOPY  11/18/2020    ESOPHAGOGASTRODUODENOSCOPY N/A 11/18/2020    Procedure: ESOPHAGOGASTRODUODENOSCOPY (EGD);  Surgeon: Zenon Spencer MD;  Location: [facility];  Service: Endoscopy;  Laterality: N/A;    ESOPHAGOGASTRODUODENOSCOPY N/A 12/11/2020    Procedure: EGD (ESOPHAGOGASTRODUODENOSCOPY);  Surgeon: Juancho Muse MD;  Location: [facility] (2ND FLR);  Service: Endoscopy;  Laterality: N/A;    EXCISION, SMALL INTESTINE  2/6/2024    Procedure: EXCISION, SMALL INTESTINE;  Surgeon: Hammad Reynolds MD;  Location: [facility] OR 2ND FLR;  Service: Colon and Rectal;;    HYSTERECTOMY  2014    [facility] for cervical cancer    ILEOSTOMY  9/17/2020    Procedure: CREATION, ILEOSTOMY;  Surgeon: Hammad Reynolds MD;  Location: [facility] OR 2ND FLR;  Service: Colon and Rectal;;    ILEOSTOMY CLOSURE N/A 2/6/2024    Procedure: CLOSURE, ILEOSTOMY;  Surgeon: Hammad Reynolds MD;  Location: [facility] OR [address] [place] [address];  Service: Colon and Rectal;   Laterality: N/A;    INSERTION OF CATHETER N/A 7/8/2025    Procedure: ANTERIOR PLACEMENT OF NEPHROURETERO CATHETER;  Surgeon: Leslie Balbuena MD;  Location: [facility] OR 2ND FLR;  Service: Urology;  Laterality: N/A;    LAPAROTOMY, EXPLORATORY N/A 2/6/2024    Procedure: OPEN LAPAROTOMY, EXPLORATORY;  Surgeon: Leslie Balbuena MD;  Location: [facility] OR 2ND FLR;  Service: Urology;  Laterality: N/A;  22 modifier    LOOPOGRAM N/A 4/20/2021    Procedure: LOOPOGRAM;  Surgeon: Leslie Balbuena MD;  [facility]ST FLR;  Service: Urology;  Laterality: N/A;    LOOPOGRAM N/A 6/6/2023    Procedure: LOOPOGRAM;  Surgeon: Leslie Balbuena MD;  Location: [facility] OR 1ST FLR;  Service: Urology;  Laterality: N/A;    LOOPOGRAM  7/8/2025    Procedure: LOOPOGRAM,LOOPOSCOPY;  Surgeon: Leslie Balbuena MD;  [facility];  Service: Urology;;    LYSIS OF ADHESIONS  2/6/2024    Procedure: LYSIS, ADHESIONS;  Surgeon: Leslie Balbuena MD;  [facility];  Service: Urology;;    LYSIS OF ADHESIONS  2/6/2024    Procedure: LYSIS, ADHESIONS;  Surgeon: Hammad Reynolds MD;  [facility];  Service: Colon and Rectal;;    MOBILIZATION OF SPLENIC FLEXURE N/A 9/11/2020    Procedure: MOBILIZATION, COLONIC;  Surgeon: Hammad Reynolds MD;  [facility];  Service: Colon and Rectal;  Laterality: N/A;    NEPHROSTOGRAPHY Bilateral 4/17/2021    Procedure: Nephrostogram;  Surgeon: Celeste Surgeon;  Location: [facility];  Service: Anesthesiology;  Laterality: Bilateral;    OOPHORECTOMY      PERCUTANEOUS NEPHROLITHOTOMY Right 4/21/2023    Procedure: NEPHROLITHOTOMY, PERCUTANEOUS;  Surgeon: Chirag Russ MD;  [facility] FLR;  Service: Urology;  Laterality: Right;  2.5 hrs    PERCUTANEOUS NEPHROSTOMY  4/21/2023    Procedure: CREATION, NEPHROSTOMY, PERCUTANEOUS with removal of existing nephrostomy tube;  Surgeon: Chirag Russ MD;  Location: [facility] OR [address] [place] [address];  Service: Urology;;    PYELOSCOPY Right  10/27/2022    Procedure: PYELOSCOPY;  Surgeon: Chirag Russ MD;  Location: [facility] OR 1ST FLR;  Service: Urology;  Laterality: Right;    REPLACEMENT OF NEPHROSTOMY TUBE Right 10/27/2022    Procedure: REPLACEMENT, NEPHROSTOMY TUBE;  Surgeon: Chirag Russ MD;  Location: [facility] OR 1ST FLR;  Service: Urology;  Laterality: Right;    RETROPERITONEAL LYMPHADENECTOMY Right 9/17/2018    Procedure: LYMPHADENECTOMY, RETROPERITONEUM;  Surgeon: Sebas Reed MD;  Location: [facility] OR 2ND FLR;  Service: General;  Laterality: Right;  ILIAC    REVISION OF ANASTOMOSIS OF URETER TO ILEAL CONDUIT N/A 2/6/2024    Procedure: REVISION, ANASTOMOSIS, URETEROILEAL;  Surgeon: Leslie Balbuena MD;  Location: [facility] OR 2ND FLR;  Service: Urology;  Laterality: N/A;    URETEROSCOPIC REMOVAL OF URETERIC CALCULUS Right 10/27/2022    Procedure: REMOVAL, CALCULUS, URETER, URETEROSCOPIC;  Surgeon: Chirag Russ MD;  Location: [facility] OR 1ST FLR;  Service: Urology;  Laterality: Right;    URETEROSCOPY Right 10/27/2022    Procedure: URETEROSCOPY-ANTEGRADE;  Surgeon: Chirag Russ MD;  [facility];  Service: Urology;  Laterality: Right;    URETEROSCOPY  7/8/2025    Procedure: ANTERIOR NEPHROURETEROSCOPY;  Surgeon: Leslie Balbuena MD;  Location: [facility] OR 2ND FLR;  Service: Urology;;     Family History   Problem Relation Name Age of Onset    Cancer Mother          Cervical    Cervical cancer Mother      Colon cancer Father      Esophageal cancer Father      Cancer Father          Lung-smoker    Heart disease Sister      Suicide Daughter Gayla         jumped from parking structure    Breast cancer Maternal Aunt      Heart disease Maternal Grandmother      Rectal cancer Neg Hx      Stomach cancer Neg Hx      Crohn's disease Neg Hx      Ulcerative colitis Neg Hx      Diabetes Neg Hx      Hypertension Neg Hx       Social History[1]  Review of Systems    Physical Exam     Initial Vitals [07/18/25 0239]   BP Pulse Resp Temp SpO2  "  129/65 86 18 98 °F (36.7 °C) 96 %      MAP       --         Physical Exam    ED Course   Procedures  Labs Reviewed   HEPATITIS C ANTIBODY   HEP C VIRUS HOLD SPECIMEN   HIV 1 / 2 ANTIBODY          Imaging Results    None          Medications - No data to display  Medical Decision Making                                        Clinical Impression:   ***Please document a Clinical Impression and click the "Refresh" button to refresh your note and automatically pull in before signing.***                    [1]   Social History  Tobacco Use    Smoking status: Never    Smokeless tobacco: Never   Substance Use Topics    Alcohol use: No     Alcohol/week: 0.0 standard drinks of alcohol    Drug use: No     "

## 2025-07-18 NOTE — PROGRESS NOTES
CHIEF COMPLAINT:    Mrs. Riley is a 60 y.o. female presenting for a post op visit, following antegrade ureteroscopy, loop o gram  looposcopy, placement of a nephroureteral stent on 7/8/2025.    PRESENTING ILLNESS:    Edita Riley is a 60 y.o. female who returns for follow up.  She was found to have a 2 cm segment of ureter which was scarred.  There was no reflux of urine to the right side.  No stones were seen in the ureter.       Past Surgical History:   Procedure Laterality Date    ANASTOMOSIS OF INTESTINE N/A 2/6/2024    Procedure: ANASTOMOSIS, INTESTINE - Ileocolic anastomosis;  Surgeon: Hammad Reynolds MD;  [facility] 2ND FLR;  Service: Colon and Rectal;  Laterality: N/A;    ANTEGRADE NEPHROSTOGRAPHY Bilateral 9/28/2020    Procedure: Nephrostogram - antegrade;  Surgeon: Leslie Balbuena MD;  [facility] 1ST FLR;  Service: Urology;  Laterality: Bilateral;    ANTEGRADE NEPHROSTOGRAPHY Left 4/20/2021    Procedure: NEPHROSTOGRAM, ANTEGRADE;  Surgeon: Leslie Balbuena MD;  Location: [facility] OR 1ST FLR;  Service: Urology;  Laterality: Left;    ANTEGRADE NEPHROSTOGRAPHY Right 10/27/2022    Procedure: NEPHROSTOGRAM, ANTEGRADE;  Surgeon: Chirag Russ MD;  [facility]LR;  Service: Urology;  Laterality: Right;    ANTEGRADE NEPHROSTOGRAPHY Right 4/21/2023    Procedure: NEPHROSTOGRAM, ANTEGRADE;  Surgeon: Chirag Russ MD;  [facility] 2[facility] FLR;  Service: Urology;  Laterality: Right;    ANTEGRADE NEPHROSTOGRAPHY Left 6/6/2023    Procedure: Nephrostogram - antegrade;  Surgeon: Leslie Balbuena MD;  [facility]ST FLR;  Service: Urology;  Laterality: Left;    ANTEGRADE NEPHROSTOGRAPHY  7/8/2025    Procedure: ANTERIOR NEPHROURETEROGRAM;  Surgeon: Leslie Balbuena MD;  [facility] 2[facility] FLR;  Service: Urology;;    BILATERAL OOPHORECTOMY  2015    CHOLECYSTECTOMY  11/09/2016    Done at [facility], path showed chronic cholecystitis and gallstones    cold knife  conization  2014    COLECTOMY, RIGHT  9/17/2020    Procedure: COLECTOMY, RIGHT Extended;  Surgeon: Hammad Reynolds MD;  Location: [facility] OR 2ND FLR;  Service: Colon and Rectal;;    COLONOSCOPY  2014    COLONOSCOPY N/A 10/24/2016    at [facility], Dr Gutiérrez    COLONOSCOPY N/A 5/18/2018    Procedure: COLONOSCOPY;  Surgeon: Arden Gutiérrez MD;  Location: [facility] (4TH FLR);  Service: Endoscopy;  Laterality: N/A;    COLONOSCOPY N/A 7/28/2020    Procedure: COLONOSCOPY;  Surgeon: Hammad Reynolds MD;  Location: [facility] ENDO (4TH FLR);  Service: Colon and Rectal;  Laterality: N/A;  covid test [place] 7/25    CYSTOSCOPY WITH URETEROSCOPY, RETROGRADE PYELOGRAPHY, AND INSERTION OF STENT Bilateral 3/21/2020    Procedure: CYSTOSCOPY, WITH RETROGRADE PYELOGRAM,;  Surgeon: Leslie Balbuena MD;  Location: [facility] OR 1ST FLR;  Service: Urology;  Laterality: Bilateral;    DILATION OF NEPHROSTOMY TRACT Right 10/27/2022    Procedure: DILATION, NEPHROSTOMY TRACT;  Surgeon: Chirag Russ MD;  Location: [facility] OR 1ST FLR;  Service: Urology;  Laterality: Right;    ESOPHAGOGASTRODUODENOSCOPY  2014    ESOPHAGOGASTRODUODENOSCOPY  11/18/2020    ESOPHAGOGASTRODUODENOSCOPY N/A 11/18/2020    Procedure: ESOPHAGOGASTRODUODENOSCOPY (EGD);  Surgeon: Zenon Spencer MD;  Location: [facility];  Service: Endoscopy;  Laterality: N/A;    ESOPHAGOGASTRODUODENOSCOPY N/A 12/11/2020    Procedure: EGD (ESOPHAGOGASTRODUODENOSCOPY);  Surgeon: Juancho Muse MD;  Location: [facility] ENDO (2ND FLR);  Service: Endoscopy;  Laterality: N/A;    EXCISION, SMALL INTESTINE  2/6/2024    Procedure: EXCISION, SMALL INTESTINE;  Surgeon: Hammad Reynolds MD;  Location: [facility] OR 2ND FLR;  Service: Colon and Rectal;;    HYSTERECTOMY  2014    [facility] for cervical cancer    ILEOSTOMY  9/17/2020    Procedure: CREATION, ILEOSTOMY;  Surgeon: Hammad Reynolds MD;  Location: [facility] OR 2ND FLR;  Service: Colon and Rectal;;    ILEOSTOMY CLOSURE N/A 2/6/2024    Procedure: CLOSURE, ILEOSTOMY;   Surgeon: Hammad Reynolds MD;  [facility] 2[facility] FLR;  Service: Colon and Rectal;  Laterality: N/A;    INSERTION OF CATHETER N/A 7/8/2025    Procedure: ANTERIOR PLACEMENT OF NEPHROURETERO CATHETER;  Surgeon: Leslie Balbuena MD;  [facility] 2[facility] FLR;  Service: Urology;  Laterality: N/A;    LAPAROTOMY, EXPLORATORY N/A 2/6/2024    Procedure: OPEN LAPAROTOMY, EXPLORATORY;  Surgeon: Leslie Balbuena MD;  [facility] 2ND FLR;  Service: Urology;  Laterality: N/A;  22 modifier    LOOPOGRAM N/A 4/20/2021    Procedure: LOOPOGRAM;  Surgeon: Leslie Balbuena MD;  [facility] 1ST FLR;  Service: Urology;  Laterality: N/A;    LOOPOGRAM N/A 6/6/2023    Procedure: LOOPOGRAM;  Surgeon: Leslie Balbuena MD;  [facility] 1ST FLR;  Service: Urology;  Laterality: N/A;    LOOPOGRAM  7/8/2025    Procedure: LOOPOGRAM,LOOPOSCOPY;  Surgeon: Leslie Balbuena MD;  [facility] 2[facility] FLR;  Service: Urology;;    LYSIS OF ADHESIONS  2/6/2024    Procedure: LYSIS, ADHESIONS;  Surgeon: Leslie Balbuena MD;  [facility]: [facility] OR 2ND FLR;  Service: Urology;;    LYSIS OF ADHESIONS  2/6/2024    Procedure: LYSIS, ADHESIONS;  Surgeon: Hammad Reynolds MD;  [facility];  Service: Colon and Rectal;;    MOBILIZATION OF SPLENIC FLEXURE N/A 9/11/2020    Procedure: MOBILIZATION, COLONIC;  Surgeon: Hammad Reynolds MD;  [facility] 2[facility] FLR;  Service: Colon and Rectal;  Laterality: N/A;    NEPHROSTOGRAPHY Bilateral 4/17/2021    Procedure: Nephrostogram;  Surgeon: Celeste Surgeon;  Location: [facility];  Service: Anesthesiology;  Laterality: Bilateral;    OOPHORECTOMY      PERCUTANEOUS NEPHROLITHOTOMY Right 4/21/2023    Procedure: NEPHROLITHOTOMY, PERCUTANEOUS;  Surgeon: Chirag Russ MD;  [facility] 2ND FLR;  Service: Urology;  Laterality: Right;  2.5 hrs    PERCUTANEOUS NEPHROSTOMY  4/21/2023    Procedure: CREATION, NEPHROSTOMY, PERCUTANEOUS with removal of existing nephrostomy tube;  Surgeon: Chirag  VIVIANA Russ MD;  Location: [facility] OR [address] [place] [address];  Service: Urology;;    PYELOSCOPY Right 10/27/2022    Procedure: PYELOSCOPY;  Surgeon: Chirag Russ MD;  Location: [facility] OR [address] [place] [address];  Service: Urology;  Laterality: Right;    REPLACEMENT OF NEPHROSTOMY TUBE Right 10/27/2022    Procedure: REPLACEMENT, NEPHROSTOMY TUBE;  Surgeon: Chirag Russ MD;  Location: [facility] OR [address] [place] [address];  Service: Urology;  Laterality: Right;    RETROPERITONEAL LYMPHADENECTOMY Right 9/17/2018    Procedure: LYMPHADENECTOMY, RETROPERITONEUM;  Surgeon: Sebas Reed MD;  Location: [facility] OR [address] [place] [address];  Service: General;  Laterality: Right;  ILIAC    REVISION OF ANASTOMOSIS OF URETER TO ILEAL CONDUIT N/A 2/6/2024    Procedure: REVISION, ANASTOMOSIS, URETEROILEAL;  Surgeon: Leslie Balbuena MD;  Location: [facility] OR [address] [place] [address];  Service: Urology;  Laterality: N/A;    URETEROSCOPIC REMOVAL OF URETERIC CALCULUS Right 10/27/2022    Procedure: REMOVAL, CALCULUS, URETER, URETEROSCOPIC;  Surgeon: Chirag Russ MD;  Location: [facility] OR [address] [place] [address];  Service: Urology;  Laterality: Right;    URETEROSCOPY Right 10/27/2022    Procedure: URETEROSCOPY-ANTEGRADE;  Surgeon: Chirag Russ MD;  Location: [facility] OR [address] [place] [address];  Service: Urology;  Laterality: Right;    URETEROSCOPY  7/8/2025    Procedure: ANTERIOR NEPHROURETEROSCOPY;  Surgeon: Leslie Balbuena MD;  Location: [facility] OR [address] [place] [address];  Service: Urology;;       Allergies:  Bee sting [allergen ext-venom-honey bee] and Grass pollen-bermuda, standard    Medications:  Encounter Medications[1]      PHYSICAL EXAMINATION:    The patient generally appears in good health, is appropriately interactive, and is in no apparent distress.    Skin: No lesions.    Mental: Cooperative with normal affect.    Neuro: Grossly intact.    HEENT: Normal. No evidence of lymphadenopathy.    Chest:  normal inspiratory effort.    Abdomen:  Soft, non-tender. No masses or organomegaly. Bladder is not palpable. No evidence of flank discomfort. No  evidence of inguinal hernia.  The stoma is pink the distal end of the nephroureteral stent is protruding from the stoma.     Extremities: No clubbing, cyanosis, or edema    IMPRESSION:    Post op state   Ureteral stricture near the uretero-bowel anastamosis    PLAN:    1.  The loop was cut so she could pouch the stoma normally  2.  Order was placed for IR to replace the nephroureteral tube with a nephrostomy tube so that it can be clamped.  If she does not tolerate the clamping trial then will plan to do an side to side anastamosis redo.      Visit complexity today is associated with medical care services that are part of the ongoing care related to the single serious and/or complex condition of status post colon conduit, ureteral stricture. A longitudinal relationship exists or is being developed between the patient and this practitioner for the care of this condition.                [1]   Outpatient Encounter Medications as of 2025   Medication Sig Dispense Refill    acetaminophen (TYLENOL) 500 MG tablet Take 1 tablet (500 mg total) by mouth every 6 (six) hours as needed for Pain. (Patient not taking: Reported on 2025) 20 tablet 0    [] cefdinir (OMNICEF) 300 MG capsule Take 1 capsule (300 mg total) by mouth 2 (two) times daily. for 7 days 14 capsule 0    ferrous sulfate (FEOSOL) 325 mg (65 mg iron) Tab tablet TAKE 1 TABLET BY MOUTH TWICE A DAY (Patient not taking: Reported on 2025) 60 tablet 2    loratadine (CLARITIN) 10 mg tablet Take 10 mg by mouth daily as needed for Allergies. (Patient not taking: Reported on 2025)      mirtazapine (REMERON) 30 MG tablet Take 1 tablet (30 mg total) by mouth nightly. 30 tablet 11    oxyCODONE-acetaminophen (PERCOCET) 5-325 mg per tablet Take 1 tablet by mouth every 8 (eight) hours as needed for Pain. (Patient not taking: Reported on 2025)      pantoprazole (PROTONIX) 40 MG tablet Take 1 tablet (40 mg total) by mouth once daily. for 14 days 14  tablet 0     No facility-administered encounter medications on file as of 7/18/2025.

## 2025-07-18 NOTE — ED PROVIDER NOTES
"Source of History:  Patient and chart    Chief complaint:  Wound Check (Pt states the stoma of her colostomy site appears to be protruding a little more than normal since last night. Pt states some pain to area. Pt state has had colostomy for "years". Pt states first time she has had issues with it)      HPI:  Edita Riley is a 60 y.o. female with a medical history as below presents to the emergency department with a chief complaint of concerned about her urostomy stoma.  Patient woke up this morning to drain her urinary bag when she noticed a small plastic tube protruding from her ostomy site.  Patient denies any fever, chills, nausea, vomiting, diarrhea.  She does not have close follow up with her urologist and states that she would see the urologist tomorrow.  She endorses that has still draining urine.  She has no further concerns at this time.    Review of patient's allergies indicates:   Allergen Reactions    Bee sting [allergen ext-venom-honey bee]      Rash      Grass pollen-bermuda, standard      rash       Medications Ordered Prior to Encounter[1]    PMH:  As per HPI and below:  Past Medical History:   Diagnosis Date    Abnormal mammogram 08/25/2020    Anemia due to chronic blood loss     Anxiety     Bilateral ureteral obstruction 09/11/2020    obstructive uropathy now s/p transverse colon conduit, s/p right  Nephrostomy tube, s/p Ileostomy,    Cervical cancer 2014    s/p XRT & Bilateral Ureteral strictures with obstructive uropathy    Chronic back pain     CKD (chronic kidney disease)     Colon polyp 09/14/2015    Diarrhea due to malabsorption 11/14/2018    Difficult intubation     DVT of lower extremity, bilateral 11/04/2020    Emphysema of lung 04/10/2023    Family history of colon cancer 07/28/2020    Fibromyalgia     Folate deficiency     Fungemia 09/27/2020    GERD (gastroesophageal reflux disease)     Hard to intubate 04/20/2021    Hemifacial spasm 09/16/2015    Hiatal hernia 2014    " Hypertension     Hyponatremia 09/10/2023    Impaired functional mobility, balance, gait, and endurance 07/24/2015    Lactose intolerance     Major depressive disorder, recurrent episode, moderate 06/02/2023    History of suicidal ideation    Metastatic squamous cell carcinoma to lymph node 10/11/2018    Nephrostomy complication 10/26/2023    Neuropathy due to chemotherapeutic drug 11/14/2018    Osteoarthritis of back     Peritonitis 09/22/2020    QT prolongation 04/16/2021    Refusal of blood transfusions as patient is [employer]     Schatzki's ring 09/14/2015    Seen on outside EGD 05/2014, underwent esophageal dilatation. Bx were negative.     Seizure-like activity 12/02/2018    Sleep stage dysfunction     Noted on PSG 06/2017; negative for obstructive sleep apnea     Small bowel obstruction 08/19/2023    Stroke     Urinary tract infection associated with nephrostomy catheter 06/11/2020    recurrent MDR UTI/pyelonephritis     Past Surgical History:   Procedure Laterality Date    ANASTOMOSIS OF INTESTINE N/A 2/6/2024    Procedure: ANASTOMOSIS, INTESTINE - Ileocolic anastomosis;  Surgeon: Hammad Reynolds MD;  Location: [facility] OR 2ND FLR;  Service: Colon and Rectal;  Laterality: N/A;    ANTEGRADE NEPHROSTOGRAPHY Bilateral 9/28/2020    Procedure: Nephrostogram - antegrade;  Surgeon: Leslie Balubena MD;  Location: [facility] OR 1ST FLR;  Service: Urology;  Laterality: Bilateral;    ANTEGRADE NEPHROSTOGRAPHY Left 4/20/2021    Procedure: NEPHROSTOGRAM, ANTEGRADE;  Surgeon: Leslie Balbuena MD;  Location: [facility] OR 1ST FLR;  Service: Urology;  Laterality: Left;    ANTEGRADE NEPHROSTOGRAPHY Right 10/27/2022    Procedure: NEPHROSTOGRAM, ANTEGRADE;  Surgeon: Chirag Russ MD;  Location: [facility] OR 1ST FLR;  Service: Urology;  Laterality: Right;    ANTEGRADE NEPHROSTOGRAPHY Right 4/21/2023    Procedure: NEPHROSTOGRAM, ANTEGRADE;  Surgeon: Chirag Russ MD;  Location: [facility] OR 2ND FLR;  Service: Urology;  Laterality:  Right;    ANTEGRADE NEPHROSTOGRAPHY Left 6/6/2023    Procedure: Nephrostogram - antegrade;  Surgeon: Leslie Balbuena MD;  Location: [facility] OR 1ST FLR;  Service: Urology;  Laterality: Left;    ANTEGRADE NEPHROSTOGRAPHY  7/8/2025    Procedure: ANTERIOR NEPHROURETEROGRAM;  Surgeon: Leslie Balbuena MD;  Location: [facility] OR 2ND FLR;  Service: Urology;;    BILATERAL OOPHORECTOMY  2015    CHOLECYSTECTOMY  11/09/2016    Done at [facility], path showed chronic cholecystitis and gallstones    cold knife conization  2014    COLECTOMY, RIGHT  9/17/2020    Procedure: COLECTOMY, RIGHT Extended;  Surgeon: Hammad Reynolds MD;  Location: [facility] OR 2ND FLR;  Service: Colon and Rectal;;    COLONOSCOPY  2014    COLONOSCOPY N/A 10/24/2016    at [facility], Dr Gutiérrez    COLONOSCOPY N/A 5/18/2018    Procedure: COLONOSCOPY;  Surgeon: Arden Gutiérrez MD;  Location: [facility] (4TH FLR);  Service: Endoscopy;  Laterality: N/A;    COLONOSCOPY N/A 7/28/2020    Procedure: COLONOSCOPY;  Surgeon: Hammad Reynolds MD;  Location: [facility] (4TH FLR);  Service: Colon and Rectal;  Laterality: N/A;  covid test [place] 7/25    CYSTOSCOPY WITH URETEROSCOPY, RETROGRADE PYELOGRAPHY, AND INSERTION OF STENT Bilateral 3/21/2020    Procedure: CYSTOSCOPY, WITH RETROGRADE PYELOGRAM,;  Surgeon: Leslie Balbuena MD;  [facility];  Service: Urology;  Laterality: Bilateral;    DILATION OF NEPHROSTOMY TRACT Right 10/27/2022    Procedure: DILATION, NEPHROSTOMY TRACT;  Surgeon: Chirag Russ MD;  Location: [facility] OR 1ST FLR;  Service: Urology;  Laterality: Right;    ESOPHAGOGASTRODUODENOSCOPY  2014    ESOPHAGOGASTRODUODENOSCOPY  11/18/2020    ESOPHAGOGASTRODUODENOSCOPY N/A 11/18/2020    Procedure: ESOPHAGOGASTRODUODENOSCOPY (EGD);  Surgeon: Zenon Spencer MD;  Location: [facility];  Service: Endoscopy;  Laterality: N/A;    ESOPHAGOGASTRODUODENOSCOPY N/A 12/11/2020    Procedure: EGD (ESOPHAGOGASTRODUODENOSCOPY);  Surgeon: Juancho Muse MD;  Location:  [facility] (2ND FLR);  Service: Endoscopy;  Laterality: N/A;    EXCISION, SMALL INTESTINE  2/6/2024    Procedure: EXCISION, SMALL INTESTINE;  Surgeon: Hammad Reynolds MD;  Location: [facility] OR 2ND FLR;  Service: Colon and Rectal;;    HYSTERECTOMY  2014    [facility] for cervical cancer    ILEOSTOMY  9/17/2020    Procedure: CREATION, ILEOSTOMY;  Surgeon: Hammad Reynolds MD;  Location: [facility] OR 2ND FLR;  Service: Colon and Rectal;;    ILEOSTOMY CLOSURE N/A 2/6/2024    Procedure: CLOSURE, ILEOSTOMY;  Surgeon: Hammad Reynolds MD;  Location: [facility] OR 2ND FLR;  Service: Colon and Rectal;  Laterality: N/A;    INSERTION OF CATHETER N/A 7/8/2025    Procedure: ANTERIOR PLACEMENT OF NEPHROURETERO CATHETER;  Surgeon: Leslie Balbuena MD;  Location: [facility] OR 2ND FLR;  Service: Urology;  Laterality: N/A;    LAPAROTOMY, EXPLORATORY N/A 2/6/2024    Procedure: OPEN LAPAROTOMY, EXPLORATORY;  Surgeon: Leslie Balbuena MD;  Location: [facility] OR 2ND FLR;  Service: Urology;  Laterality: N/A;  22 modifier    LOOPOGRAM N/A 4/20/2021    Procedure: LOOPOGRAM;  Surgeon: Leslie Balbuena MD;  Location: [facility] OR 1ST FLR;  Service: Urology;  Laterality: N/A;    LOOPOGRAM N/A 6/6/2023    Procedure: LOOPOGRAM;  Surgeon: Leslie Balbuena MD;  Location: [facility] OR 1ST FLR;  Service: Urology;  Laterality: N/A;    LOOPOGRAM  7/8/2025    Procedure: LOOPOGRAM,LOOPOSCOPY;  Surgeon: Leslie Balbuena MD;  Location: [facility] OR 2ND FLR;  Service: Urology;;    LYSIS OF ADHESIONS  2/6/2024    Procedure: LYSIS, ADHESIONS;  Surgeon: Leslie Balbuena MD;  Location: [facility] OR 2ND FLR;  Service: Urology;;    LYSIS OF ADHESIONS  2/6/2024    Procedure: LYSIS, ADHESIONS;  Surgeon: Hammad Reynolds MD;  Location: [facility] OR 2ND FLR;  Service: Colon and Rectal;;    MOBILIZATION OF SPLENIC FLEXURE N/A 9/11/2020    Procedure: MOBILIZATION, COLONIC;  Surgeon: Hammad Reynolds MD;  Location: [facility] OR 2ND FLR;  Service: Colon and Rectal;  Laterality: N/A;    NEPHROSTOGRAPHY Bilateral 4/17/2021     Procedure: Nephrostogram;  Surgeon: Celeste Surgeon;  Location: [facility];  Service: Anesthesiology;  Laterality: Bilateral;    OOPHORECTOMY      PERCUTANEOUS NEPHROLITHOTOMY Right 4/21/2023    Procedure: NEPHROLITHOTOMY, PERCUTANEOUS;  Surgeon: Chirag Russ MD;  Location: [facility] OR 2ND FLR;  Service: Urology;  Laterality: Right;  2.5 hrs    PERCUTANEOUS NEPHROSTOMY  4/21/2023    Procedure: CREATION, NEPHROSTOMY, PERCUTANEOUS with removal of existing nephrostomy tube;  Surgeon: Chirag Russ MD;  [facility];  Service: Urology;;    PYELOSCOPY Right 10/27/2022    Procedure: PYELOSCOPY;  Surgeon: Chirag Russ MD;  [facility];  Service: Urology;  Laterality: Right;    REPLACEMENT OF NEPHROSTOMY TUBE Right 10/27/2022    Procedure: REPLACEMENT, NEPHROSTOMY TUBE;  Surgeon: Chirag Russ MD;  Location: [facility] OR [address] [place] [address];  Service: Urology;  Laterality: Right;    RETROPERITONEAL LYMPHADENECTOMY Right 9/17/2018    Procedure: LYMPHADENECTOMY, RETROPERITONEUM;  Surgeon: Sebas Reed MD;  Location: [facility] OR [address] [place] [address];  Service: General;  Laterality: Right;  ILIAC    REVISION OF ANASTOMOSIS OF URETER TO ILEAL CONDUIT N/A 2/6/2024    Procedure: REVISION, ANASTOMOSIS, URETEROILEAL;  Surgeon: Leslie Balbuena MD;  Location: [facility] OR [address] [place] [address];  Service: Urology;  Laterality: N/A;    URETEROSCOPIC REMOVAL OF URETERIC CALCULUS Right 10/27/2022    Procedure: REMOVAL, CALCULUS, URETER, URETEROSCOPIC;  Surgeon: Chirag Russ MD;  [facility];  Service: Urology;  Laterality: Right;    URETEROSCOPY Right 10/27/2022    Procedure: URETEROSCOPY-ANTEGRADE;  Surgeon: Chirag Russ MD;  [facility];  Service: Urology;  Laterality: Right;    URETEROSCOPY  7/8/2025    Procedure: ANTERIOR NEPHROURETEROSCOPY;  Surgeon: Leslie Balbuena MD;  Location: [facility] OR [address] [place] [address];  Service: Urology;;       Social History     Socioeconomic History    Marital status:       Spouse name: Hammad    Number of children: 2   Tobacco Use    Smoking status: Never    Smokeless tobacco: Never   Substance and Sexual Activity    Alcohol use: No     Alcohol/week: 0.0 standard drinks of alcohol    Drug use: No    Sexual activity: Yes     Partners: Male     Birth control/protection: None     Comment:  19 years since    Social History Narrative    , twin daughters (1  2018), disabled due to childhood stroke, [employer]ist sophia     Social Drivers of Health     Financial Resource Strain: Low Risk  (2024)    Overall Financial Resource Strain (CARDIA)     Difficulty of Paying Living Expenses: Not very hard   Food Insecurity: No Food Insecurity (2024)    Hunger Vital Sign     Worried About Running Out of Food in the Last Year: Never true     Ran Out of Food in the Last Year: Never true   Transportation Needs: No Transportation Needs (2024)    TRANSPORTATION NEEDS     Transportation : No   Physical Activity: Sufficiently Active (2024)    Exercise Vital Sign     Days of Exercise per Week: 7 days     Minutes of Exercise per Session: 30 min   Stress: No Stress Concern Present (2024)    [employer] [place] [employer] - Occupational Stress Questionnaire     Feeling of Stress : Not at all   Housing Stability: Unknown (2024)    Housing Stability Vital Sign     Unable to Pay for Housing in the Last Year: No     Homeless in the Last Year: No       Family History   Problem Relation Name Age of Onset    Cancer Mother          Cervical    Cervical cancer Mother      Colon cancer Father      Esophageal cancer Father      Cancer Father          Lung-smoker    Heart disease Sister      Suicide Daughter Gayla         jumped from parking structure    Breast cancer Maternal Aunt      Heart disease Maternal Grandmother      Rectal cancer Neg Hx      Stomach cancer Neg Hx      Crohn's disease Neg Hx      Ulcerative colitis Neg Hx      Diabetes  Neg Hx      Hypertension Neg Hx         Physical Exam:      Vitals:    07/18/25 0239   BP: 129/65   Pulse: 86   Resp: 18   Temp: 98 °F (36.7 °C)     Physical Exam    Gen:  Well-appearing lady in no acute distress  Mental Status:  Alert and oriented x3.  Appropriate conversant     Skin: Warm, dry. No rashes seen.  Eyes: No conjunctival injection.  Pulm: CTAB. No increased work of breathing.  No significant tachypnea.  No conversational dyspnea.    CV:  Normal rate  Abd: Soft.  Not distended.  Nontender.  Clean dry ostomy site in her left lower quadrant.  Small plastic tube protruding a few mm from her ostomy site which is actively draining urine.  No surrounding cellulitis or induration noted.  MSK: No deformities.    Neuro: Awake. Speech normal. No focal neuro deficit observed.      Procedures  Laboratory Studies:  Labs Reviewed   HEPATITIS C ANTIBODY   HEP C VIRUS HOLD SPECIMEN   HIV 1 / 2 ANTIBODY       Chart reviewed.  Patient has had a long history of urological surgeries.  She has close follow up with the doctor to Sree's office.    Imaging Results    None         Medications Given:  Medications - No data to display             MDM:    60 y.o. female with concerns about her ostomy    No sign of infection or significant dysfunction of her ostomy site.  That has a small to protruding, uncertain clinical significance of this tube.  That has actively draining urine and appears to be functioning normally.  Patient has a sensation of an empty bladder.  I have advised the patient to follow up with her urologist tomorrow for evaluation.  I have provided return precautions.      Medical Decision Making       Diagnostic Impression:    1. Deficient knowledge of ostomy       ED Disposition Condition    Discharge Stable          ED Prescriptions    None       Follow-up Information    None           Patient and/or family understands the plan and is in agreement, verbalized understanding, questions answered        Attending  Attestation:   Physician Attestation Statement for Resident:  As the supervising MD  .  -: Attending Attestation:   I oversaw the resident's evaluation of the patient and have evaluated the patient myself. The case and management decisions were discussed with the resident.   Please see the resident note for further details regarding the patient's history, physical exam, and pertinent imaging, lab results or consultations.  I have reviewed and agree with the documented findings, interpretation of studies and results, and plan of care.    60-year-old female with past medical history urostomy and nephrostomy tubes presenting with concern over displaced tubes.  Urine flowing from both nephrostomy tube and urostomy stoma, however small piece of plastic is visible through the urostomy stoma.  No acute indication for labs or imaging at this time, it was explained to patient that she would probably receive  follow-up if she called the urology clinic and explained to her issue in order to get a same-day appointment.  Patient given strict immediate return precautions for sport fissures worsening symptoms, outpatient follow-up for further management.                    OB History          1    Para   1    Term   1       0    AB   0    Living   2         SAB   0    IAB   0    Ectopic   0    Multiple   1    Live Births   2                      Alex Mccracken MD  Resident  [phone]         [1]   No current facility-administered medications on file prior to encounter.     Current Outpatient Medications on File Prior to Encounter   Medication Sig Dispense Refill    acetaminophen (TYLENOL) 500 MG tablet Take 1 tablet (500 mg total) by mouth every 6 (six) hours as needed for Pain. 20 tablet 0    ferrous sulfate (FEOSOL) 325 mg (65 mg iron) Tab tablet TAKE 1 TABLET BY MOUTH TWICE A DAY (Patient not taking: Reported on 2025) 60 tablet 2    loratadine (CLARITIN) 10 mg tablet Take 10 mg by mouth daily as  needed for Allergies.      mirtazapine (REMERON) 30 MG tablet Take 1 tablet (30 mg total) by mouth nightly. 30 tablet 11    oxyCODONE-acetaminophen (PERCOCET) 5-325 mg per tablet Take 1 tablet by mouth every 8 (eight) hours as needed for Pain.      pantoprazole (PROTONIX) 40 MG tablet Take 1 tablet (40 mg total) by mouth once daily. for 14 days 14 tablet 0        Abdiel Dailey MD  07/18/25 0454

## 2025-07-24 ENCOUNTER — TELEPHONE (OUTPATIENT)
Dept: INTERVENTIONAL RADIOLOGY/VASCULAR | Facility: CLINIC | Age: 60
End: 2025-07-24
Payer: MEDICAID

## 2025-07-28 ENCOUNTER — TELEPHONE (OUTPATIENT)
Dept: INTERVENTIONAL RADIOLOGY/VASCULAR | Facility: HOSPITAL | Age: 60
End: 2025-07-28
Payer: MEDICAID

## 2025-07-28 NOTE — NURSING
Pre-procedure call complete.  2 patient identifier used (name and ).      No food after midnight.  You may drink clear liquids (sports drinks, clear juices) until 2 hours before arrival time.  Clear liquids include only water, black coffee (no creamer), clear oral rehydration drinks, clear sports drinks and clear fruit juices.  Clear liquids do NOT include anything with pulp or food particles (chicken broth, ice cream, yogurt, jello, etc).  NO orange juice, pulpy juices, or apple cider.  If you can read newsprint through the liquid, it qualifies as clear.  IF UNSURE, drink water instead.      Have NOTHING BY MOUTH 2 hours before arrival time.  Medication the morning of your procedure may be taken with a sip of water     Pt aware will need someone to provide transport home and monitor pt 8 hours post procedure.  No driving for at least 24 hours after procedure.   Patient reports she does not take blood pressure, heart medications, seizure and anti-rejection medications.     Do not take sleep medication (including OTC) the night before procedure.  Arrival time and location given.  Expected length of stay reviewed.  Covid screening completed.  Pt verbalized understanding of all pre-procedure instructions.  Written instructions and directions sent to patient in Mychart/portal.

## 2025-07-29 ENCOUNTER — HOSPITAL ENCOUNTER (OUTPATIENT)
Dept: INTERVENTIONAL RADIOLOGY/VASCULAR | Facility: HOSPITAL | Age: 60
Discharge: HOME | End: 2025-07-29
Attending: UROLOGY
Payer: MEDICAID

## 2025-07-29 VITALS
HEART RATE: 66 BPM | DIASTOLIC BLOOD PRESSURE: 67 MMHG | HEIGHT: 69 IN | SYSTOLIC BLOOD PRESSURE: 133 MMHG | RESPIRATION RATE: 16 BRPM | OXYGEN SATURATION: 100 % | WEIGHT: 203 LBS | BODY MASS INDEX: 30.07 KG/M2 | TEMPERATURE: 97 F

## 2025-07-29 DIAGNOSIS — N13.5 URETERAL STRICTURE: ICD-10-CM

## 2025-07-29 LAB
ALBUMIN SERPL BCP-MCNC: 3.8 G/DL (ref 3.5–5.2)
ALP SERPL-CCNC: 112 UNIT/L (ref 40–150)
ALT SERPL W/O P-5'-P-CCNC: 25 UNIT/L (ref 0–55)
ANION GAP (OHS): 8 MMOL/L (ref 8–16)
AST SERPL-CCNC: 34 UNIT/L (ref 0–50)
BILIRUB SERPL-MCNC: 0.4 MG/DL (ref 0.1–1)
BUN SERPL-MCNC: 16 MG/DL (ref 8–23)
CALCIUM SERPL-MCNC: 9.3 MG/DL (ref 8.7–10.5)
CHLORIDE SERPL-SCNC: 109 MMOL/L (ref 95–110)
CO2 SERPL-SCNC: 24 MMOL/L (ref 23–29)
CREAT SERPL-MCNC: 1.4 MG/DL (ref 0.5–1.4)
GFR SERPLBLD CREATININE-BSD FMLA CKD-EPI: 43 ML/MIN/1.73/M2
GLUCOSE SERPL-MCNC: 93 MG/DL (ref 70–110)
POTASSIUM SERPL-SCNC: 4.6 MMOL/L (ref 3.5–5.1)
PROT SERPL-MCNC: 8.3 GM/DL (ref 6–8.4)
SODIUM SERPL-SCNC: 141 MMOL/L (ref 136–145)

## 2025-07-29 PROCEDURE — 80053 COMPREHEN METABOLIC PANEL: CPT

## 2025-07-29 PROCEDURE — 63600175 PHARM REV CODE 636 W HCPCS: Performed by: RADIOLOGY

## 2025-07-29 PROCEDURE — 50435 EXCHANGE NEPHROSTOMY CATH: CPT | Mod: RT | Performed by: RADIOLOGY

## 2025-07-29 PROCEDURE — C1729 CATH, DRAINAGE: HCPCS

## 2025-07-29 PROCEDURE — 25500020 PHARM REV CODE 255: Performed by: UROLOGY

## 2025-07-29 PROCEDURE — C1769 GUIDE WIRE: HCPCS

## 2025-07-29 RX ORDER — FENTANYL CITRATE 50 UG/ML
INJECTION, SOLUTION INTRAMUSCULAR; INTRAVENOUS
Status: COMPLETED | OUTPATIENT
Start: 2025-07-29 | End: 2025-07-29

## 2025-07-29 RX ORDER — LIDOCAINE HYDROCHLORIDE 10 MG/ML
1 INJECTION, SOLUTION EPIDURAL; INFILTRATION; INTRACAUDAL; PERINEURAL ONCE
Status: DISCONTINUED | OUTPATIENT
Start: 2025-07-29 | End: 2025-07-30 | Stop reason: HOSPADM

## 2025-07-29 RX ORDER — MIDAZOLAM HYDROCHLORIDE 1 MG/ML
INJECTION, SOLUTION INTRAMUSCULAR; INTRAVENOUS
Status: COMPLETED | OUTPATIENT
Start: 2025-07-29 | End: 2025-07-29

## 2025-07-29 RX ORDER — SODIUM CHLORIDE 9 MG/ML
INJECTION, SOLUTION INTRAVENOUS CONTINUOUS
Status: DISCONTINUED | OUTPATIENT
Start: 2025-07-29 | End: 2025-07-30 | Stop reason: HOSPADM

## 2025-07-29 RX ADMIN — MIDAZOLAM HYDROCHLORIDE 1 MG: 2 INJECTION, SOLUTION INTRAMUSCULAR; INTRAVENOUS at 02:07

## 2025-07-29 RX ADMIN — FENTANYL CITRATE 50 MCG: 50 INJECTION, SOLUTION INTRAMUSCULAR; INTRAVENOUS at 02:07

## 2025-07-29 RX ADMIN — IOHEXOL 15 ML: 300 INJECTION, SOLUTION INTRAVENOUS at 02:07

## 2025-07-29 RX ADMIN — FENTANYL CITRATE 25 MCG: 50 INJECTION, SOLUTION INTRAMUSCULAR; INTRAVENOUS at 02:07

## 2025-07-29 RX ADMIN — MIDAZOLAM HYDROCHLORIDE 0.5 MG: 2 INJECTION, SOLUTION INTRAMUSCULAR; INTRAVENOUS at 02:07

## 2025-07-29 NOTE — PLAN OF CARE
Pt arrived to IR room 190 for neph tube exchange. Pt oriented to unit and staff. Plan of care reviewed with patient, patient verbalizes understanding. Comfort measures utilized. Pt safely transferred from stretcher to procedural table. Fall risk reviewed with patient, fall risk interventions maintained. Safety strap applied, positioner pillows utilized to minimize pressure points. Blankets applied. Pt prepped and draped utilizing standard sterile technique. Patient placed on continuous monitoring, as required by sedation policy. Timeouts completed utilizing standard universal time-out, per department and facility policy. RN to remain at bedside, continuous monitoring maintained. Pt resting comfortably. Denies pain/discomfort. Will continue to monitor. See flow sheets for monitoring, medication administration, and updates.

## 2025-07-29 NOTE — DISCHARGE SUMMARY
Radiology Discharge Summary      Hospital Course: No complications    Admit Date: 7/29/2025  Discharge Date: 07/29/2025     Instructions Given to Patient: Yes  Diet: Resume prior diet  Activity: activity as tolerated and no driving for today    Description of Condition on Discharge: Stable  Vital Signs (Most Recent): Temp: 97.3 °F (36.3 °C) (07/29/25 1457)  Pulse: 71 (07/29/25 1457)  Resp: 16 (07/29/25 1457)  BP: 137/64 (07/29/25 1457)  SpO2: 99 % (07/29/25 1457)    Discharge Disposition: Home    Discharge Diagnosis: ureteral obstruction     Follow-up: per Urology    Shiva Sandoval MD     Nephrostomy Tube Home Care     What is a nephrostomy tube?   Kidney stones, kidney infection, trauma, or other reasons can cause problems with urine leaving the body normally. When this happens, your provider may recommend a nephrostomy tube to drain urine from your kidney to a bag outside your body. A nephrostomy tube is a small, flexible tube that goes through the skin directly into the kidney. You may have one tube or two depending on if one or both kidneys are affected.     Nephrostomy tubes help drain the urine from the body in two ways:   1. PCN: An external drain allows urine to drain outside of your body into a collection bag.   2. PCNU: Allows urine to drain into the bladder as well as outside your body into a collection bag.     Follow Up:  You should be following up with your referring provider/primary care provider while the tube is in place. The tube will stay in for at least 4 weeks, unless surgery is planned to correct your kidney problem. Generally, the tube(s) are replaced every 8-12 weeks. If you do not have an appointment and feel it is time for the tube to be replaced, please call [phone].     Will my diet or daily activity be limited by my nephrostomy tube?    Continue with your regular diet and prescribed medications, unless otherwise specified on your discharge instructions. For general questions  about your diet and medication, contact your primary care physician.    Avoid any activity that may result in pulling on the drain.    Do not submerge the drain. This means do not swim, sit in a hot tub, soak in a tub bath, or do anything that involves putting the drain under water.     How do I take care of my nephrostomy tube?   Empty the collection bag into the toilet when it is half full, or at least once each day. Your provider will let you know if you should keep track of the amount.   1. Turn the knob at the bottom of the bag and drain into toilet. If your provider has told you to track the contents, empty the bag into a measuring container and record the amount.   2. Wash your hands.   3. Change outside tube and bag weekly. These can be bought from any medical supplies store.     Can I shower?   While it is okay to shower with your tube(s), do not soak in any water with your tube(s). Soaking may lead to infection.    First 2 weeks: Before taking a shower, cover the dressing with a double layer of plastic wrap (like Saran wrap) where it enters the skin. Tape down the edges. After the shower, remove the plastic and apply a clean bandage.    After 2 weeks: You may shower without the plastic wrap. Rinse well. Pat the area dry and apply a clean bandage.     Do I need to change the bandage?   Change your dressing at least every 2 days, or if it becomes dirty or wet.   1. Wash your hands.   2. Remove the old bandage carefully. Try not to pull on the drain or stitches.   3. If the skin needs to be cleaned, use a gauze or cotton swab to gently clean it with soap and water.   4. Wait for your skin to dry.   5. Apply a sterile 4x4 gauze over the tube where it enters your body. Be careful not to kink the drain.   6. Secure with paper tape.   7. Wash your hands.     Call your provider immediately or seek emergency medical attention if you experience any of the following symptoms:    Fever/chills    New or worsening  belly pain    Nausea or vomiting    New or worsening redness or swelling where the drain meets the skin    Pus leaking around the drain    Abrupt change in amount of urine in the bag or when you urinate    The drain begins to leak, breaks, or falls out     Troubleshooting:  If the tube falls out   1. Cover the hole in the skin with gauze pads and tape.   2. Call the [facility]iology [facility] for instructions on how to get the tube replaced.   Interventional Radiology Clinic   For complications   [phone]. Monday - Friday, 8:00 am - 4:00 pm    [phone] After hours and on holidays. Ask to speak with the interventional radiologist on call.     For Scheduling   [phone] Monday - Friday, 8:00 am - 4:00 pm       Flushing Drains:  If you are told to flush drains, you will need to flush each of your urinary drains with 10 cc of saline each day.   1. Gather supplies: 10 mL of sterile normal saline solution, syringe, sterile gauze, paper tape, and rubbing alcohol (liquid or single-use alcohol wipes).   2. Wash your hands.   3. Unscrew the drainage bag from drain (place a towel beneath to catch any drips).   4. Wipe drain with alcohol.   5. Fill the syringe with 10 mL of normal saline solution.   6. Attach syringe to drain.   7. Gently inject normal saline into drain. Do NOT pull back on the syringe.   8. Unscrew syringe.   9. Reattach drainage bag.   10. Wash your hands and dispose of supplies.     **Capping Drains:  If you are told to cap your drain You should cap the drain 48 hours after the procedure. To cap drain follow these instructions:  Turn stop cock to off position.  Unscrew bag from drain, while leaving stop cock in position.  Keep drainage bag in case it is needed in the future.    If you experience any of the following symptoms, try uncapping the drain and attaching the drainage bag. If this does not improve your symptoms, call your provider immediately or seek emergency medical attention.     New or worsening yellowing of your skin/eyes    Fever    New or worsening belly pain

## 2025-07-29 NOTE — DISCHARGE INSTRUCTIONS
Nephrostomy Tube Home Care      What is a nephrostomy tube?   Kidney stones, kidney infection, trauma, or other reasons can cause problems with urine leaving the body normally. When this happens, your provider may recommend a nephrostomy tube to drain urine from your kidney to a bag outside your body. A nephrostomy tube is a small, flexible tube that goes through the skin directly into the kidney. You may have one tube or two depending on if one or both kidneys are affected.      Nephrostomy tubes help drain the urine from the body in two ways:   1. PCN: An external drain allows urine to drain outside of your body into a collection bag.   2. PCNU: Allows urine to drain into the bladder as well as outside your body into a collection bag.      Follow Up:  You should be following up with your referring provider/primary care provider while the tube is in place. The tube will stay in for at least 4 weeks, unless surgery is planned to correct your kidney problem. Generally, the tube(s) are replaced every 8-12 weeks. If you do not have an appointment and feel it is time for the tube to be replaced, please call [phone].      Will my diet or daily activity be limited by my nephrostomy tube?    Continue with your regular diet and prescribed medications, unless otherwise specified on your discharge instructions. For general questions about your diet and medication, contact your primary care physician.    Avoid any activity that may result in pulling on the drain.    Do not submerge the drain. This means do not swim, sit in a hot tub, soak in a tub bath, or do anything that involves putting the drain under water.      How do I take care of my nephrostomy tube?   Empty the collection bag into the toilet when it is half full, or at least once each day. Your provider will let you know if you should keep track of the amount.   1. Turn the knob at the bottom of the bag and drain into toilet. If your provider has told you to  track the contents, empty the bag into a measuring container and record the amount.   2. Wash your hands.   3. Change outside tube and bag weekly. These can be bought from any medical supplies store.      Can I shower?   While it is okay to shower with your tube(s), do not soak in any water with your tube(s). Soaking may lead to infection.    First 2 weeks: Before taking a shower, cover the dressing with a double layer of plastic wrap (like Saran wrap) where it enters the skin. Tape down the edges. After the shower, remove the plastic and apply a clean bandage.    After 2 weeks: You may shower without the plastic wrap. Rinse well. Pat the area dry and apply a clean bandage.      Do I need to change the bandage?   Change your dressing at least every 2 days, or if it becomes dirty or wet.   1. Wash your hands.   2. Remove the old bandage carefully. Try not to pull on the drain or stitches.   3. If the skin needs to be cleaned, use a gauze or cotton swab to gently clean it with soap and water.   4. Wait for your skin to dry.   5. Apply a sterile 4x4 gauze over the tube where it enters your body. Be careful not to kink the drain.   6. Secure with paper tape.   7. Wash your hands.      Call your provider immediately or seek emergency medical attention if you experience any of the following symptoms:    Fever/chills    New or worsening belly pain    Nausea or vomiting    New or worsening redness or swelling where the drain meets the skin    Pus leaking around the drain    Abrupt change in amount of urine in the bag or when you urinate    The drain begins to leak, breaks, or falls out      Troubleshooting:  If the tube falls out   1. Cover the hole in the skin with gauze pads and tape.   2. Call the Interventional Radiology Clinic for instructions on how to get the tube replaced.   Inter[facility]iology Clinic   For complications   [phone]. Monday - Friday, 8:00 am - 4:00 pm    [phone] After hours and on  holidays. Ask to speak with the interventional radiologist on call.      For Scheduling   [phone] Monday - Friday, 8:00 am - 4:00 pm         Flushing Drains:  If you are told to flush drains, you will need to flush each of your urinary drains with 10 cc of saline each day.   1. Gather supplies: 10 mL of sterile normal saline solution, syringe, sterile gauze, paper tape, and rubbing alcohol (liquid or single-use alcohol wipes).   2. Wash your hands.   3. Unscrew the drainage bag from drain (place a towel beneath to catch any drips).   4. Wipe drain with alcohol.   5. Fill the syringe with 10 mL of normal saline solution.   6. Attach syringe to drain.   7. Gently inject normal saline into drain. Do NOT pull back on the syringe.   8. Unscrew syringe.   9. Reattach drainage bag.   10. Wash your hands and dispose of supplies.      **Capping Drains:  If you are told to cap your drain You should cap the drain 48 hours after the procedure. To cap drain follow these instructions:  Turn stop cock to off position.  Unscrew bag from drain, while leaving stop cock in position.  Keep drainage bag in case it is needed in the future.     If you experience any of the following symptoms, try uncapping the drain and attaching the drainage bag. If this does not improve your symptoms, call your provider immediately or seek emergency medical attention.    New or worsening yellowing of your skin/eyes    Fever    New or worsening belly pain

## 2025-07-29 NOTE — H&P
Radiology History & Physical      SUBJECTIVE:     Chief Complaint: h/o obstructive uropathy    History of Present Illness:  Edita Riley is a 60 y.o. female who presents for RIGHT PCN exchange with plans to start capping trial. Tube last exchanged 5/17/25. She reports no issues with the tube since last exchange.        Past Medical History:   Diagnosis Date    Abnormal mammogram 08/25/2020    Anemia due to chronic blood loss     Anxiety     Bilateral ureteral obstruction 09/11/2020    obstructive uropathy now s/p transverse colon conduit, s/p right  Nephrostomy tube, s/p Ileostomy,    Cervical cancer 2014    s/p XRT & Bilateral Ureteral strictures with obstructive uropathy    Chronic back pain     CKD (chronic kidney disease)     Colon polyp 09/14/2015    Diarrhea due to malabsorption 11/14/2018    Difficult intubation     DVT of lower extremity, bilateral 11/04/2020    Emphysema of lung 04/10/2023    Family history of colon cancer 07/28/2020    Fibromyalgia     Folate deficiency     Fungemia 09/27/2020    GERD (gastroesophageal reflux disease)     Hard to intubate 04/20/2021    Hemifacial spasm 09/16/2015    Hiatal hernia 2014    Hypertension     Hyponatremia 09/10/2023    Impaired functional mobility, balance, gait, and endurance 07/24/2015    Lactose intolerance     Major depressive disorder, recurrent episode, moderate 06/02/2023    History of suicidal ideation    Metastatic squamous cell carcinoma to lymph node 10/11/2018    Nephrostomy complication 10/26/2023    Neuropathy due to chemotherapeutic drug 11/14/2018    Osteoarthritis of back     Peritonitis 09/22/2020    QT prolongation 04/16/2021    Refusal of blood transfusions as patient is Spiritism     Schatzki's ring 09/14/2015    Seen on outside EGD 05/2014, underwent esophageal dilatation. Bx were negative.     Seizure-like activity 12/02/2018    Sleep stage dysfunction     Noted on PSG 06/2017; negative for obstructive sleep apnea      Small bowel obstruction 08/19/2023    Stroke     Urinary tract infection associated with nephrostomy catheter 06/11/2020    recurrent MDR UTI/pyelonephritis     Past Surgical History:   Procedure Laterality Date    ANASTOMOSIS OF INTESTINE N/A 2/6/2024    Procedure: ANASTOMOSIS, INTESTINE - Ileocolic anastomosis;  Surgeon: Hammad Reynolds MD;  Location: [facility] 2ND FLR;  Service: Colon and Rectal;  Laterality: N/A;    ANTEGRADE NEPHROSTOGRAPHY Bilateral 9/28/2020    Procedure: Nephrostogram - antegrade;  Surgeon: Leslie Balbuena MD;  Location: [facility] OR 1ST FLR;  Service: Urology;  Laterality: Bilateral;    ANTEGRADE NEPHROSTOGRAPHY Left 4/20/2021    Procedure: NEPHROSTOGRAM, ANTEGRADE;  Surgeon: Leslie Balbuena MD;  Location: [facility] OR 1ST FLR;  Service: Urology;  Laterality: Left;    ANTEGRADE NEPHROSTOGRAPHY Right 10/27/2022    Procedure: NEPHROSTOGRAM, ANTEGRADE;  Surgeon: Chirag Russ MD;  Location: [facility] OR 1ST FLR;  Service: Urology;  Laterality: Right;    ANTEGRADE NEPHROSTOGRAPHY Right 4/21/2023    Procedure: NEPHROSTOGRAM, ANTEGRADE;  Surgeon: Chirag Russ MD;  Location: [facility] OR 2ND FLR;  Service: Urology;  Laterality: Right;    ANTEGRADE NEPHROSTOGRAPHY Left 6/6/2023    Procedure: Nephrostogram - antegrade;  Surgeon: Leslie Balbuena MD;  Location: [facility] OR 1ST FLR;  Service: Urology;  Laterality: Left;    ANTEGRADE NEPHROSTOGRAPHY  7/8/2025    Procedure: ANTERIOR NEPHROURETEROGRAM;  Surgeon: Leslie Balbuena MD;  Location: [facility] OR 2ND FLR;  Service: Urology;;    BILATERAL OOPHORECTOMY  2015    CHOLECYSTECTOMY  11/09/2016    Done at [facility], path showed chronic cholecystitis and gallstones    cold knife conization  2014    COLECTOMY, RIGHT  9/17/2020    Procedure: COLECTOMY, RIGHT Extended;  Surgeon: Hammad Reynolds MD;  [facility]: [facility] 2[facility] F[facility];  Service: Colon and Rectal;;    COLONOSCOPY  2014    COLONOSCOPY N/A 10/24/2016    at [facility], Dr Thomas    COLONOSCOPY N/A 5/18/2018    Procedure:  COLONOSCOPY;  Surgeon: Arden Gutiérrez MD;  Location: [facility] (4TH FLR);  Service: Endoscopy;  Laterality: N/A;    COLONOSCOPY N/A 7/28/2020    Procedure: COLONOSCOPY;  Surgeon: Hammad Reynolds MD;  Location: [facility] (4TH FLR);  Service: Colon and Rectal;  Laterality: N/A;  covid test [place] 7/25    CYSTOSCOPY WITH URETEROSCOPY, RETROGRADE PYELOGRAPHY, AND INSERTION OF STENT Bilateral 3/21/2020    Procedure: CYSTOSCOPY, WITH RETROGRADE PYELOGRAM,;  Surgeon: Leslie Balbuena MD;  Location: [facility] OR 1ST FLR;  Service: Urology;  Laterality: Bilateral;    DILATION OF NEPHROSTOMY TRACT Right 10/27/2022    Procedure: DILATION, NEPHROSTOMY TRACT;  Surgeon: Chirag Russ MD;  Location: [facility] OR 1ST FLR;  Service: Urology;  Laterality: Right;    ESOPHAGOGASTRODUODENOSCOPY  2014    ESOPHAGOGASTRODUODENOSCOPY  11/18/2020    ESOPHAGOGASTRODUODENOSCOPY N/A 11/18/2020    Procedure: ESOPHAGOGASTRODUODENOSCOPY (EGD);  Surgeon: Zenon Spencer MD;  Location: [facility];  Service: Endoscopy;  Laterality: N/A;    ESOPHAGOGASTRODUODENOSCOPY N/A 12/11/2020    Procedure: EGD (ESOPHAGOGASTRODUODENOSCOPY);  Surgeon: Juancho Muse MD;  Location: [facility] (2ND FLR);  Service: Endoscopy;  Laterality: N/A;    EXCISION, SMALL INTESTINE  2/6/2024    Procedure: EXCISION, SMALL INTESTINE;  Surgeon: Hammad Reynolds MD;  Location: [facility] OR 2ND FLR;  Service: Colon and Rectal;;    HYSTERECTOMY  2014    [facility] for cervical [facility]    ILEOSTOMY  9/17/2020    Procedure: CREATION, ILEOSTOMY;  Surgeon: Hammad Reynolds MD;  Location: [facility] OR 2ND FLR;  Service: Colon and Rectal;;    ILEOSTOMY CLOSURE N/A 2/6/2024    Procedure: CLOSURE, ILEOSTOMY;  Surgeon: Hammad Reynolds MD;  [facility];  Service: Colon and Rectal;  Laterality: N/A;    INSERTION OF CATHETER N/A 7/8/2025    Procedure: ANTERIOR PLACEMENT OF NEPHROURETERO CATHETER;  Surgeon: Leslie Balbuena MD;  Location: [facility] OR [address] [place] [address];  Service: Urology;  Laterality: N/A;     LAPAROTOMY, EXPLORATORY N/A 2/6/2024    Procedure: OPEN LAPAROTOMY, EXPLORATORY;  Surgeon: Leslie Balbuena MD;  Location: [facility] OR 2ND FLR;  Service: Urology;  Laterality: N/A;  22 modifier    LOOPOGRAM N/A 4/20/2021    Procedure: LOOPOGRAM;  Surgeon: Leslie Balbuena MD;  [facility]: [facility] 1ST FLR;  Service: Urology;  Laterality: N/A;    LOOPOGRAM N/A 6/6/2023    Procedure: LOOPOGRAM;  Surgeon: Leslie Balbuena MD;  Location: [facility] 1ST FLR;  Service: Urology;  Laterality: N/A;    LOOPOGRAM  7/8/2025    Procedure: LOOPOGRAM,LOOPOSCOPY;  Surgeon: Leslie Balbuena MD;  Location: [facility] 2ND FLR;  Service: Urology;;    LYSIS OF ADHESIONS  2/6/2024    Procedure: LYSIS, ADHESIONS;  Surgeon: Leslie Balbuena MD;  Location: [facility] FLR;  Service: Urology;;    LYSIS OF ADHESIONS  2/6/2024    Procedure: LYSIS, ADHESIONS;  Surgeon: Hammad Reynolds MD;  [facility];  Service: Colon and Rectal;;    MOBILIZATION OF SPLENIC FLEXURE N/A 9/11/2020    Procedure: MOBILIZATION, COLONIC;  Surgeon: Hammad Reynolds MD;  [facility]: [facility] 2[facility] FLR;  Service: Colon and Rectal;  Laterality: N/A;    NEPHROSTOGRAPHY Bilateral 4/17/2021    Procedure: Nephrostogram;  Surgeon: Celeste Surgeon;  Location: [facility];  Service: Anesthesiology;  Laterality: Bilateral;    OOPHORECTOMY      PERCUTANEOUS NEPHROLITHOTOMY Right 4/21/2023    Procedure: NEPHROLITHOTOMY, PERCUTANEOUS;  Surgeon: Chirag Russ MD;  Location: [facility] 2ND FLR;  Service: Urology;  Laterality: Right;  2.5 hrs    PERCUTANEOUS NEPHROSTOMY  4/21/2023    Procedure: CREATION, NEPHROSTOMY, PERCUTANEOUS with removal of existing nephrostomy tube;  Surgeon: Chirag Russ MD;  Location: [facility] 2[facility] FLR;  Service: Urology;;    PYELOSCOPY Right 10/27/2022    Procedure: PYELOSCOPY;  Surgeon: Chirag Russ MD;  Location: [facility] OR 1ST FLR;  Service: Urology;  Laterality: Right;    REPLACEMENT OF NEPHROSTOMY TUBE Right 10/27/2022    Procedure: REPLACEMENT,  NEPHROSTOMY TUBE;  Surgeon: Chirag Russ MD;  Location: [facility] OR 1ST FLR;  Service: Urology;  Laterality: Right;    RETROPERITONEAL LYMPHADENECTOMY Right 9/17/2018    Procedure: LYMPHADENECTOMY, RETROPERITONEUM;  Surgeon: Sebas Reed MD;  Location: [facility] OR 2ND FLR;  Service: General;  Laterality: Right;  ILIAC    REVISION OF ANASTOMOSIS OF URETER TO ILEAL CONDUIT N/A 2/6/2024    Procedure: REVISION, ANASTOMOSIS, URETEROILEAL;  Surgeon: Leslie Balbuena MD;  Location: [facility] OR 2ND FLR;  Service: Urology;  Laterality: N/A;    URETEROSCOPIC REMOVAL OF URETERIC CALCULUS Right 10/27/2022    Procedure: REMOVAL, CALCULUS, URETER, URETEROSCOPIC;  Surgeon: Chirag Russ MD;  [facility];  Service: Urology;  Laterality: Right;    URETEROSCOPY Right 10/27/2022    Procedure: URETEROSCOPY-ANTEGRADE;  Surgeon: Chirag Russ MD;  [facility];  Service: Urology;  Laterality: Right;    URETEROSCOPY  7/8/2025    Procedure: ANTERIOR NEPHROURETEROSCOPY;  Surgeon: Leslie Balbuena MD;  [facility];  Service: Urology;;       Home Meds:   Prior to Admission medications    Medication Sig Start Date End Date Taking? Authorizing Provider   acetaminophen (TYLENOL) 500 MG tablet Take 1 tablet (500 mg total) by mouth every 6 (six) hours as needed for Pain.  Patient not taking: Reported on 7/18/2025 2/18/24   Dave Tatum MD   ferrous sulfate (FEOSOL) 325 mg (65 mg iron) Tab tablet TAKE 1 TABLET BY MOUTH TWICE A DAY  Patient not taking: Reported on 7/1/2025 3/12/24   Leslie Balbuena MD   loratadine (CLARITIN) 10 mg tablet Take 10 mg by mouth daily as needed for Allergies.  Patient not taking: Reported on 7/18/2025 9/5/23   Segundo Lee   mirtazapine (REMERON) 30 MG tablet Take 1 tablet (30 mg total) by mouth nightly. 11/6/23 7/8/25  Andriy Coley MD   oxyCODONE-acetaminophen (PERCOCET) 5-325 mg per tablet Take 1 tablet by mouth every 8 (eight) hours as  needed for Pain.  Patient not taking: Reported on 7/18/2025    Provider, Historical   pantoprazole (PROTONIX) 40 MG tablet Take 1 tablet (40 mg total) by mouth once daily. for 14 days 8/13/24 7/8/25  Brittney Bolton MD     Anticoagulants/Antiplatelets: reviewed    Allergies:   Review of patient's allergies indicates:   Allergen Reactions    Bee sting [allergen ext-venom-honey bee]      Rash      Grass pollen-bermuda, standard      rash     Sedation History:  no adverse reactions    Review of Systems:   Hematological: no known coagulopathies  Respiratory: no shortness of breath  Cardiovascular: no chest pain  Gastrointestinal: no abdominal pain  Genito-Urinary: no dysuria  Musculoskeletal: negative  Neurological: no TIA or stroke symptoms         OBJECTIVE:     Vital Signs (Most Recent)       Physical Exam:  ASA: 2  Mallampati: 2    General: no acute distress  Mental Status: alert and oriented to person, place and time  HEENT: normocephalic, atraumatic  Chest: unlabored breathing  Heart: regular heart rate  Abdomen: nondistended  Extremity: moves all extremities    Laboratory  Lab Results   Component Value Date    INR 1.0 08/07/2024       Lab Results   Component Value Date    WBC 8.17 07/01/2025    HGB 13.0 07/01/2025    HCT 42.3 07/01/2025    MCV 89 07/01/2025     07/01/2025      Lab Results   Component Value Date    GLU 88 05/05/2025     05/05/2025    K 4.5 05/05/2025     05/05/2025    CO2 27 05/05/2025    BUN 19 05/05/2025    CREATININE 1.2 05/05/2025    CALCIUM 9.3 05/05/2025    MG 1.8 12/27/2024    ALT 15 05/05/2025    AST 18 05/05/2025    ALBUMIN 3.3 (L) 05/05/2025    BILITOT 0.3 05/05/2025    BILIDIR 0.2 12/04/2020       ASSESSMENT/PLAN:     Sedation Plan: up to moderate  Patient will undergo Right PCN exchange and capping trial.    Written consent was obtained from the patient. All questions answered.     Sonny Bobo MD  Radiology PGY-3

## 2025-08-01 ENCOUNTER — OFFICE VISIT (OUTPATIENT)
Dept: UROLOGY | Facility: CLINIC | Age: 60
End: 2025-08-01
Payer: MEDICAID

## 2025-08-01 ENCOUNTER — LAB VISIT (OUTPATIENT)
Dept: LAB | Facility: HOSPITAL | Age: 60
End: 2025-08-01
Attending: UROLOGY
Payer: MEDICAID

## 2025-08-01 VITALS
SYSTOLIC BLOOD PRESSURE: 142 MMHG | HEART RATE: 88 BPM | HEIGHT: 69 IN | DIASTOLIC BLOOD PRESSURE: 80 MMHG | BODY MASS INDEX: 30.08 KG/M2 | WEIGHT: 203.06 LBS

## 2025-08-01 DIAGNOSIS — N13.5 URETERAL STRICTURE: Primary | ICD-10-CM

## 2025-08-01 DIAGNOSIS — N13.5 URETERAL STRICTURE: ICD-10-CM

## 2025-08-01 LAB
ANION GAP (OHS): 8 MMOL/L (ref 8–16)
BUN SERPL-MCNC: 15 MG/DL (ref 8–23)
CALCIUM SERPL-MCNC: 9.4 MG/DL (ref 8.7–10.5)
CHLORIDE SERPL-SCNC: 109 MMOL/L (ref 95–110)
CO2 SERPL-SCNC: 23 MMOL/L (ref 23–29)
CREAT SERPL-MCNC: 1.3 MG/DL (ref 0.5–1.4)
GFR SERPLBLD CREATININE-BSD FMLA CKD-EPI: 47 ML/MIN/1.73/M2
GLUCOSE SERPL-MCNC: 87 MG/DL (ref 70–110)
POTASSIUM SERPL-SCNC: 3.8 MMOL/L (ref 3.5–5.1)
SODIUM SERPL-SCNC: 140 MMOL/L (ref 136–145)

## 2025-08-01 PROCEDURE — 99999 PR PBB SHADOW E&M-EST. PATIENT-LVL III: CPT | Mod: PBBFAC,,, | Performed by: UROLOGY

## 2025-08-01 PROCEDURE — 36415 COLL VENOUS BLD VENIPUNCTURE: CPT

## 2025-08-01 PROCEDURE — 99213 OFFICE O/P EST LOW 20 MIN: CPT | Mod: PBBFAC | Performed by: UROLOGY

## 2025-08-01 PROCEDURE — 80048 BASIC METABOLIC PNL TOTAL CA: CPT

## 2025-08-01 NOTE — PROGRESS NOTES
CHIEF COMPLAINT:    Mrs. Riley is a 60 y.o. female presenting for a follow up after switching out the nephroureterostomy tube for a nephrostomy tube and doing a clamping trial.  For 2 days.     PRESENTING ILLNESS:    Edita Riley is a 60 y.o. female who presents with a history of right ureteroscopy, and looposcopy, placement of a nephroureteral stent.  It was exchanged for a nephrostomy tube this week and she has had it clamped for the past 2 days.  She has not had urine leaking around the nephrostomy tube, but she has had flank tenderness, both at the tube site and in the flank with the clamping trial.  A BMP is pending from today.     Original history:    cervical cancer status post XRT, was hospitalized in March and was found to have right sided hydronephrosis.  She underwent bladder biopsy and fulguration and because of the XRT, went ahead and placed a left ureteral stents under the same anesthetic.  She later developed renal failure and ended up at [facility] where she had percutaneous nephrostomy tubes placed.       The patient is status post colon conduit 9/11/2020.  That was complicated by colon perforation and she had a diverting colostomy.  She had a left ureteral conduit scarring and this was repaired on 2/6/2024 and the colostomy was reversed at the same time.     REVIEW OF SYSTEMS:    Review of Systems   Constitutional: Negative.    HENT: Negative.     Eyes: Negative.    Respiratory: Negative.     Cardiovascular: Negative.    Gastrointestinal: Negative.    Musculoskeletal: Negative.    Skin: Negative.    Neurological: Negative.    Endo/Heme/Allergies: Negative.    Psychiatric/Behavioral: Negative.         PATIENT HISTORY:    Past Medical History:   Diagnosis Date    Abnormal mammogram 08/25/2020    Anemia due to chronic blood loss     Anxiety     Bilateral ureteral obstruction 09/11/2020    obstructive uropathy now s/p transverse colon conduit, s/p right  Nephrostomy tube,  s/p Ileostomy,    Cervical cancer 2014    s/p XRT & Bilateral Ureteral strictures with obstructive uropathy    Chronic back pain     CKD (chronic kidney disease)     Colon polyp 09/14/2015    Diarrhea due to malabsorption 11/14/2018    Difficult intubation     DVT of lower extremity, bilateral 11/04/2020    Emphysema of lung 04/10/2023    Family history of colon cancer 07/28/2020    Fibromyalgia     Folate deficiency     Fungemia 09/27/2020    GERD (gastroesophageal reflux disease)     Hard to intubate 04/20/2021    Hemifacial spasm 09/16/2015    Hiatal hernia 2014    Hypertension     Hyponatremia 09/10/2023    Impaired functional mobility, balance, gait, and endurance 07/24/2015    Lactose intolerance     Major depressive disorder, recurrent episode, moderate 06/02/2023    History of suicidal ideation    Metastatic squamous cell carcinoma to lymph node 10/11/2018    Nephrostomy complication 10/26/2023    Neuropathy due to chemotherapeutic drug 11/14/2018    Osteoarthritis of back     Peritonitis 09/22/2020    QT prolongation 04/16/2021    Refusal of blood transfusions as patient is [employer]     Altzki's ring 09/14/2015    Seen on outside EGD 05/2014, underwent esophageal dilatation. Bx were negative.     Seizure-like activity 12/02/2018    Sleep stage dysfunction     Noted on PSG 06/2017; negative for obstructive sleep apnea     Small bowel obstruction 08/19/2023    Stroke     Urinary tract infection associated with nephrostomy catheter 06/11/2020    recurrent MDR UTI/pyelonephritis       Past Surgical History:   Procedure Laterality Date    ANASTOMOSIS OF INTESTINE N/A 2/6/2024    Procedure: ANASTOMOSIS, INTESTINE - Ileocolic anastomosis;  Surgeon: Hammad Reynolds MD;  Location: [facility] OR 2ND FLR;  Service: Colon and Rectal;  Laterality: N/A;    ANTEGRADE NEPHROSTOGRAPHY Bilateral 9/28/2020    Procedure: Nephrostogram - antegrade;  Surgeon: Leslie Balbuena MD;  Location: [facility] OR 1ST FLR;  Service:  Urology;  Laterality: Bilateral;    ANTEGRADE NEPHROSTOGRAPHY Left 4/20/2021    Procedure: NEPHROSTOGRAM, ANTEGRADE;  Surgeon: Leslie Balbuena MD;  Location: [facility] OR 1ST FLR;  Service: Urology;  Laterality: Left;    ANTEGRADE NEPHROSTOGRAPHY Right 10/27/2022    Procedure: NEPHROSTOGRAM, ANTEGRADE;  Surgeon: Chirag Russ MD;  Location: [facility] OR 1ST FLR;  Service: Urology;  Laterality: Right;    ANTEGRADE NEPHROSTOGRAPHY Right 4/21/2023    Procedure: NEPHROSTOGRAM, ANTEGRADE;  Surgeon: Chirag Russ MD;  Location: [facility] OR 2ND FLR;  Service: Urology;  Laterality: Right;    ANTEGRADE NEPHROSTOGRAPHY Left 6/6/2023    Procedure: Nephrostogram - antegrade;  Surgeon: Leslie Balbuena MD;  Location: [facility] OR 1ST FLR;  Service: Urology;  Laterality: Left;    ANTEGRADE NEPHROSTOGRAPHY  7/8/2025    Procedure: ANTERIOR NEPHROURETEROGRAM;  Surgeon: Leslie Balbuena MD;  Location: [facility] OR 2ND FLR;  Service: Urology;;    BILATERAL OOPHORECTOMY  2015    CHOLECYSTECTOMY  11/09/2016    Done at [facility], path showed chronic cholecystitis and gallstones    cold knife conization  2014    COLECTOMY, RIGHT  9/17/2020    Procedure: COLECTOMY, RIGHT Extended;  Surgeon: Hammad Reynolds MD;  Location: [facility] OR 2ND FLR;  Service: Colon and Rectal;;    COLONOSCOPY  2014    COLONOSCOPY N/A 10/24/2016    at [facility], Dr Thomas    COLONOSCOPY N/A 5/18/2018    Procedure: COLONOSCOPY;  Surgeon: Arden Gutiérrez MD;  Location: [facility] ENDO (4TH FLR);  Service: Endoscopy;  Laterality: N/A;    COLONOSCOPY N/A 7/28/2020    Procedure: COLONOSCOPY;  Surgeon: Hammad Reynolds MD;  Location: [facility] ENDO (4TH FLR);  Service: Colon and Rectal;  Laterality: N/A;  covid test [place] 7/25    CYSTOSCOPY WITH URETEROSCOPY, RETROGRADE PYELOGRAPHY, AND INSERTION OF STENT Bilateral 3/21/2020    Procedure: CYSTOSCOPY, WITH RETROGRADE PYELOGRAM,;  Surgeon: Leslie Balbuena MD;  Location: [facility] OR 1ST FLR;  Service: Urology;  Laterality: Bilateral;    DILATION OF  NEPHROSTOMY TRACT Right 10/27/2022    Procedure: DILATION, NEPHROSTOMY TRACT;  Surgeon: Chirag Russ MD;  Location: [facility] OR 1ST FLR;  Service: Urology;  Laterality: Right;    ESOPHAGOGASTRODUODENOSCOPY  2014    ESOPHAGOGASTRODUODENOSCOPY  11/18/2020    ESOPHAGOGASTRODUODENOSCOPY N/A 11/18/2020    Procedure: ESOPHAGOGASTRODUODENOSCOPY (EGD);  Surgeon: Zenon Spencer MD;  Location: [facility] ENDO;  Service: Endoscopy;  Laterality: N/A;    ESOPHAGOGASTRODUODENOSCOPY N/A 12/11/2020    Procedure: EGD (ESOPHAGOGASTRODUODENOSCOPY);  Surgeon: Juancho Muse MD;  Location: [facility] ENDO (2ND FLR);  Service: Endoscopy;  Laterality: N/A;    EXCISION, SMALL INTESTINE  2/6/2024    Procedure: EXCISION, SMALL INTESTINE;  Surgeon: Hammad Reynolds MD;  Location: [facility] OR 2ND FLR;  Service: Colon and Rectal;;    HYSTERECTOMY  2014    [facility] for cervical cancer    ILEOSTOMY  9/17/2020    Procedure: CREATION, ILEOSTOMY;  Surgeon: Hammad Reynolds MD;  Location: [facility] OR 2ND FLR;  Service: Colon and Rectal;;    ILEOSTOMY CLOSURE N/A 2/6/2024    Procedure: CLOSURE, ILEOSTOMY;  Surgeon: Hammad Reynolds MD;  Location: [facility] OR 2ND FLR;  Service: Colon and Rectal;  Laterality: N/A;    INSERTION OF CATHETER N/A 7/8/2025    Procedure: ANTERIOR PLACEMENT OF NEPHROURETERO CATHETER;  Surgeon: Leslie Balbuena MD;  Location: [facility] OR 2ND FLR;  Service: Urology;  Laterality: N/A;    LAPAROTOMY, EXPLORATORY N/A 2/6/2024    Procedure: OPEN LAPAROTOMY, EXPLORATORY;  Surgeon: Leslie Balbuena MD;  Location: [facility] OR 2ND FLR;  Service: Urology;  Laterality: N/A;  22 modifier    LOOPOGRAM N/A 4/20/2021    Procedure: LOOPOGRAM;  Surgeon: Leslie Balbuena MD;  Location: [facility] OR 1ST FLR;  Service: Urology;  Laterality: N/A;    LOOPOGRAM N/A 6/6/2023    Procedure: LOOPOGRAM;  Surgeon: Leslie Balbuena MD;  Location: [facility] OR [address] [place] [address];  Service: Urology;  Laterality: N/A;    LOOPOGRAM  7/8/2025    Procedure: LOOPOGRAM,LOOPOSCOPY;  Surgeon: Leslie Balbuena  MD VIVIANA;  Location: [facility] OR 2ND FLR;  Service: Urology;;    LYSIS OF ADHESIONS  2/6/2024    Procedure: LYSIS, ADHESIONS;  Surgeon: Leslie Balbuena MD;  Location: [facility] OR 2ND FLR;  Service: Urology;;    LYSIS OF ADHESIONS  2/6/2024    Procedure: LYSIS, ADHESIONS;  Surgeon: Hammad Reynolds MD;  Location: [facility] OR [facility] FLR;  Service: Colon and Rectal;;    MOBILIZATION OF SPLENIC FLEXURE N/A 9/11/2020    Procedure: MOBILIZATION, COLONIC;  Surgeon: Hammad Reynolds MD;  Location: [facility] OR [facility] FLR;  Service: Colon and Rectal;  Laterality: N/A;    NEPHROSTOGRAPHY Bilateral 4/17/2021    Procedure: Nephrostogram;  Surgeon: Celeste Surgeon;  Location: [facility];  Service: Anesthesiology;  Laterality: Bilateral;    OOPHORECTOMY      PERCUTANEOUS NEPHROLITHOTOMY Right 4/21/2023    Procedure: NEPHROLITHOTOMY, PERCUTANEOUS;  Surgeon: Chirag Russ MD;  [facility];  Service: Urology;  Laterality: Right;  2.5 hrs    PERCUTANEOUS NEPHROSTOMY  4/21/2023    Procedure: CREATION, NEPHROSTOMY, PERCUTANEOUS with removal of existing nephrostomy tube;  Surgeon: Chirag Russ MD;  [facility];  Service: Urology;;    PYELOSCOPY Right 10/27/2022    Procedure: PYELOSCOPY;  Surgeon: Chirag Russ MD;  Location: [facility] OR 1ST FLR;  Service: Urology;  Laterality: Right;    REPLACEMENT OF NEPHROSTOMY TUBE Right 10/27/2022    Procedure: REPLACEMENT, NEPHROSTOMY TUBE;  Surgeon: Chirag Russ MD;  Location: [facility] OR 1ST FLR;  Service: Urology;  Laterality: Right;    RETROPERITONEAL LYMPHADENECTOMY Right 9/17/2018    Procedure: LYMPHADENECTOMY, RETROPERITONEUM;  Surgeon: Sebas Reed MD;  [facility];  Service: General;  Laterality: Right;  ILIAC    REVISION OF ANASTOMOSIS OF URETER TO ILEAL CONDUIT N/A 2/6/2024    Procedure: REVISION, ANASTOMOSIS, URETEROILEAL;  Surgeon: Leslie Balbuena MD;  Location: [facility] OR 2ND FLR;  Service: Urology;  Laterality: N/A;    URETEROSCOPIC REMOVAL OF  URETERIC CALCULUS Right 10/27/2022    Procedure: REMOVAL, CALCULUS, URETER, URETEROSCOPIC;  Surgeon: Chirag Russ MD;  Location: [facility] OR 1ST FLR;  Service: Urology;  Laterality: Right;    URETEROSCOPY Right 10/27/2022    Procedure: URETEROSCOPY-ANTEGRADE;  Surgeon: Chirag Russ MD;  Location: [facility] OR 1ST FLR;  Service: Urology;  Laterality: Right;    URETEROSCOPY  7/8/2025    Procedure: ANTERIOR NEPHROURETEROSCOPY;  Surgeon: Leslie Balbuena MD;  Location: [facility] OR 2ND FLR;  Service: Urology;;       Family History   Problem Relation Name Age of Onset    Cancer Mother          Cervical    Cervical cancer Mother      Colon cancer Father      Esophageal cancer Father      Cancer Father          Lung-smoker    Heart disease Sister      Suicide Daughter Gayla         jumped from parking structure    Breast cancer Maternal Aunt      Heart disease Maternal Grandmother      Rectal cancer Neg Hx      Stomach cancer Neg Hx      Crohn's disease Neg Hx      Ulcerative colitis Neg Hx      Diabetes Neg Hx      Hypertension Neg Hx         Social History[1]    Allergies:  Bee sting [allergen ext-venom-honey bee] and Grass pollen-bermuda, standard    Medications:  Encounter Medications[2]      PHYSICAL EXAMINATION:    The patient generally appears in good health, is appropriately interactive, and is in no apparent distress.    Skin: No lesions.    Mental: Cooperative with normal affect.    Neuro: Grossly intact.    HEENT: Normal. No evidence of lymphadenopathy.    Chest:  normal inspiratory effort.    Abdomen: Soft, non-tender. No masses or organomegaly. Bladder is not palpable. No evidence of flank discomfort. No evidence of inguinal hernia.    Extremities: No clubbing, cyanosis, or edema      LABS:    Lab Results   Component Value Date    BUN 15 08/01/2025    CREATININE 1.3 08/01/2025     IMPRESSION:    Right ureteral stricture just before the ureter makes the anastamosis of the colon conduit.      PLAN:    1.  When  the tube was placed to a nephrostomy bag, it did not pour out urine.  It was irrigated and irrigated easily.    2.  Placed to the bag because today is Friday and a BMP is pending.  3.  May need to do another clamping trial and check BMP and renal ultrasound after more days clamped.      I spent a total of 20 minutes on the day of the visit.  This includes face to face time and non-face to face time preparing to see the patient (eg, review of tests), obtaining and/or reviewing separately obtained history, documenting clinical information in the electronic or other health record, independently interpreting results and communicating results to the patient/family/caregiver, or care coordinator.    Visit complexity today is associated with medical care services that are part of the ongoing care related to the single serious and/or complex condition of status post colon conduit, right ureteral scarring. A longitudinal relationship exists or is being developed between the patient and this practitioner for the care of this condition.            [1]   Social History  Socioeconomic History    Marital status:      Spouse name: Hammad    Number of children: 2   Tobacco Use    Smoking status: Never    Smokeless tobacco: Never   Vaping Use    Vaping status: Never Used   Substance and Sexual Activity    Alcohol use: No     Alcohol/week: 0.0 standard drinks of alcohol    Drug use: No    Sexual activity: Yes     Partners: Male     Birth control/protection: None     Comment:  19 years since    Social History Narrative    , twin daughters (1  2018), disabled due to childhood stroke, [employer]     Social Drivers of Health     Financial Resource Strain: Low Risk  (2024)    Overall Financial Resource Strain (CARDIA)     Difficulty of Paying Living Expenses: Not very hard   Food Insecurity: No Food Insecurity (2024)    Hunger Vital Sign     Worried About Running Out of Food  in the Last Year: Never true     Ran Out of Food in the Last Year: Never true   Transportation Needs: No Transportation Needs (12/25/2024)    TRANSPORTATION NEEDS     Transportation : No   Physical Activity: Sufficiently Active (12/25/2024)    Exercise Vital Sign     Days of Exercise per Week: 7 days     Minutes of Exercise per Session: 30 min   Stress: No Stress Concern Present (12/25/2024)    [employer] [place] [employer] - Occupational Stress Questionnaire     Feeling of Stress : Not at all   Housing Stability: Unknown (12/25/2024)    Housing Stability Vital Sign     Unable to Pay for Housing in the Last Year: No     Homeless in the Last Year: No   [2]   Outpatient Encounter Medications as of 8/1/2025   Medication Sig Dispense Refill    acetaminophen (TYLENOL) 500 MG tablet Take 1 tablet (500 mg total) by mouth every 6 (six) hours as needed for Pain. (Patient not taking: Reported on 7/18/2025) 20 tablet 0    ferrous sulfate (FEOSOL) 325 mg (65 mg iron) Tab tablet TAKE 1 TABLET BY MOUTH TWICE A DAY (Patient not taking: Reported on 7/1/2025) 60 tablet 2    loratadine (CLARITIN) 10 mg tablet Take 10 mg by mouth daily as needed for Allergies. (Patient not taking: Reported on 7/18/2025)      mirtazapine (REMERON) 30 MG tablet Take 1 tablet (30 mg total) by mouth nightly. 30 tablet 11    oxyCODONE-acetaminophen (PERCOCET) 5-325 mg per tablet Take 1 tablet by mouth every 8 (eight) hours as needed for Pain. (Patient not taking: Reported on 7/18/2025)      pantoprazole (PROTONIX) 40 MG tablet Take 1 tablet (40 mg total) by mouth once daily. for 14 days 14 tablet 0     No facility-administered encounter medications on file as of 8/1/2025.

## 2025-08-04 ENCOUNTER — HOSPITAL ENCOUNTER (EMERGENCY)
Facility: HOSPITAL | Age: 60
Discharge: HOME | End: 2025-08-04
Attending: EMERGENCY MEDICINE
Payer: MEDICAID

## 2025-08-04 VITALS
BODY MASS INDEX: 30.07 KG/M2 | DIASTOLIC BLOOD PRESSURE: 80 MMHG | HEIGHT: 69 IN | HEART RATE: 63 BPM | RESPIRATION RATE: 16 BRPM | SYSTOLIC BLOOD PRESSURE: 121 MMHG | OXYGEN SATURATION: 98 % | TEMPERATURE: 98 F | WEIGHT: 203 LBS

## 2025-08-04 DIAGNOSIS — R10.9 RIGHT FLANK PAIN: Primary | ICD-10-CM

## 2025-08-04 DIAGNOSIS — T83.022A NEPHROSTOMY TUBE DISPLACED: ICD-10-CM

## 2025-08-04 LAB
ABSOLUTE EOSINOPHIL (OHS): 0.13 K/UL
ABSOLUTE MONOCYTE (OHS): 0.74 K/UL (ref 0.3–1)
ABSOLUTE NEUTROPHIL COUNT (OHS): 3.75 K/UL (ref 1.8–7.7)
ANION GAP (OHS): 7 MMOL/L (ref 8–16)
BASOPHILS # BLD AUTO: 0.04 K/UL
BASOPHILS NFR BLD AUTO: 0.6 %
BUN SERPL-MCNC: 14 MG/DL (ref 8–23)
CALCIUM SERPL-MCNC: 9.3 MG/DL (ref 8.7–10.5)
CHLORIDE SERPL-SCNC: 111 MMOL/L (ref 95–110)
CO2 SERPL-SCNC: 19 MMOL/L (ref 23–29)
CREAT SERPL-MCNC: 1.3 MG/DL (ref 0.5–1.4)
ERYTHROCYTE [DISTWIDTH] IN BLOOD BY AUTOMATED COUNT: 15.6 % (ref 11.5–14.5)
GFR SERPLBLD CREATININE-BSD FMLA CKD-EPI: 47 ML/MIN/1.73/M2
GLUCOSE SERPL-MCNC: 94 MG/DL (ref 70–110)
HCT VFR BLD AUTO: 37 % (ref 37–48.5)
HCV AB SERPL QL IA: NORMAL
HGB BLD-MCNC: 11.3 GM/DL (ref 12–16)
HIV 1+2 AB+HIV1 P24 AG SERPL QL IA: NORMAL
IMM GRANULOCYTES # BLD AUTO: 0.01 K/UL (ref 0–0.04)
IMM GRANULOCYTES NFR BLD AUTO: 0.2 % (ref 0–0.5)
LYMPHOCYTES # BLD AUTO: 1.7 K/UL (ref 1–4.8)
MCH RBC QN AUTO: 27.2 PG (ref 27–31)
MCHC RBC AUTO-ENTMCNC: 30.5 G/DL (ref 32–36)
MCV RBC AUTO: 89 FL (ref 82–98)
NUCLEATED RBC (/100WBC) (OHS): 0 /100 WBC
PLATELET # BLD AUTO: 213 K/UL (ref 150–450)
PMV BLD AUTO: 10.6 FL (ref 9.2–12.9)
POTASSIUM SERPL-SCNC: 3.6 MMOL/L (ref 3.5–5.1)
RBC # BLD AUTO: 4.15 M/UL (ref 4–5.4)
RELATIVE EOSINOPHIL (OHS): 2 %
RELATIVE LYMPHOCYTE (OHS): 26.7 % (ref 18–48)
RELATIVE MONOCYTE (OHS): 11.6 % (ref 4–15)
RELATIVE NEUTROPHIL (OHS): 58.9 % (ref 38–73)
SODIUM SERPL-SCNC: 137 MMOL/L (ref 136–145)
WBC # BLD AUTO: 6.37 K/UL (ref 3.9–12.7)

## 2025-08-04 PROCEDURE — 82310 ASSAY OF CALCIUM: CPT

## 2025-08-04 PROCEDURE — 85025 COMPLETE CBC W/AUTO DIFF WBC: CPT

## 2025-08-04 PROCEDURE — 87389 HIV-1 AG W/HIV-1&-2 AB AG IA: CPT | Performed by: PHYSICIAN ASSISTANT

## 2025-08-04 PROCEDURE — 96374 THER/PROPH/DIAG INJ IV PUSH: CPT

## 2025-08-04 PROCEDURE — 99285 EMERGENCY DEPT VISIT HI MDM: CPT | Mod: 25

## 2025-08-04 PROCEDURE — 86803 HEPATITIS C AB TEST: CPT | Performed by: PHYSICIAN ASSISTANT

## 2025-08-04 PROCEDURE — 96376 TX/PRO/DX INJ SAME DRUG ADON: CPT

## 2025-08-04 PROCEDURE — 63600175 PHARM REV CODE 636 W HCPCS

## 2025-08-04 RX ORDER — OXYCODONE HYDROCHLORIDE 5 MG/1
5 TABLET ORAL EVERY 8 HOURS PRN
Qty: 9 TABLET | Refills: 0 | Status: SHIPPED | OUTPATIENT
Start: 2025-08-04 | End: 2025-08-07

## 2025-08-04 RX ORDER — HYDROMORPHONE HYDROCHLORIDE 1 MG/ML
0.2 INJECTION, SOLUTION INTRAMUSCULAR; INTRAVENOUS; SUBCUTANEOUS
Status: COMPLETED | OUTPATIENT
Start: 2025-08-04 | End: 2025-08-04

## 2025-08-04 RX ORDER — LIDOCAINE 50 MG/G
1 PATCH TOPICAL DAILY
Qty: 14 PATCH | Refills: 0 | Status: SHIPPED | OUTPATIENT
Start: 2025-08-04 | End: 2025-08-18

## 2025-08-04 RX ADMIN — HYDROMORPHONE HYDROCHLORIDE 0.2 MG: 1 INJECTION, SOLUTION INTRAMUSCULAR; INTRAVENOUS; SUBCUTANEOUS at 11:08

## 2025-08-04 RX ADMIN — HYDROMORPHONE HYDROCHLORIDE 0.2 MG: 1 INJECTION, SOLUTION INTRAMUSCULAR; INTRAVENOUS; SUBCUTANEOUS at 02:08

## 2025-08-06 ENCOUNTER — TELEPHONE (OUTPATIENT)
Dept: PRIMARY CARE CLINIC | Facility: CLINIC | Age: 60
End: 2025-08-06
Payer: MEDICAID

## 2025-08-06 ENCOUNTER — TELEPHONE (OUTPATIENT)
Dept: UROLOGY | Facility: CLINIC | Age: 60
End: 2025-08-06
Payer: MEDICAID

## 2025-08-07 LAB — HOLD SPECIMEN: NORMAL

## 2025-08-12 ENCOUNTER — HOSPITAL ENCOUNTER (OUTPATIENT)
Dept: RADIOLOGY | Facility: HOSPITAL | Age: 60
Discharge: HOME | End: 2025-08-12
Attending: NURSE PRACTITIONER
Payer: MEDICAID

## 2025-08-12 DIAGNOSIS — Z12.31 ENCOUNTER FOR SCREENING MAMMOGRAM FOR BREAST CANCER: ICD-10-CM

## 2025-08-12 PROCEDURE — 77067 SCR MAMMO BI INCL CAD: CPT | Mod: TC

## 2025-08-25 PROBLEM — N20.1 URETERAL CALCULUS: Status: RESOLVED | Noted: 2025-07-08 | Resolved: 2025-08-25

## (undated) DEVICE — JELLY LUBRICANT 1 OZ TUBE

## (undated) DEVICE — SYR 30CC LUER LOCK

## (undated) DEVICE — PACK SURGERY START

## (undated) DEVICE — TRAY CATH 1-LYR URIMTR 16FR

## (undated) DEVICE — BOWL STERILE LARGE 32OZ

## (undated) DEVICE — BOVIE SUCTION

## (undated) DEVICE — DRAPE ABDOMINAL TIBURON 14X11

## (undated) DEVICE — SPRAY MASTISOL

## (undated) DEVICE — SEE MEDLINE ITEM 152487

## (undated) DEVICE — DRESSING TEGADERM 4.4X5IN

## (undated) DEVICE — DRAPE LAP T SHT W/ INSTR PAD

## (undated) DEVICE — SUT VICRYL CTD 2-0 GI 27 SH

## (undated) DEVICE — Device

## (undated) DEVICE — GOWN X-LG STERILE BACK

## (undated) DEVICE — RELOAD PROXIMATE CUT BLUE 75MM

## (undated) DEVICE — ELECTRODE LOOP CUTTING BIPOLAR

## (undated) DEVICE — SYR 50CC LL

## (undated) DEVICE — TRAY CYSTO BASIN

## (undated) DEVICE — TRAY CATH FOL SIL URIMTR 16FR

## (undated) DEVICE — CATH POLLACK OPEN-END FLEXI-TI

## (undated) DEVICE — TUBING SUC UNIV W/CONN 12FT

## (undated) DEVICE — SEE MEDLINE ITEM 146313

## (undated) DEVICE — POWDER ARISTA AH 3G

## (undated) DEVICE — SUT CTD VICRYL 3-0 VIL BR

## (undated) DEVICE — PLUG CATHETER STERILE FOLEY

## (undated) DEVICE — VALVE OLYMPUS SCOPE SUCTION

## (undated) DEVICE — SYR 50ML CATH TIP

## (undated) DEVICE — GOWN SMARTGOWN LVL4 X-LONG XL

## (undated) DEVICE — SET UROLOGY TBNG UP TO 300MMHG

## (undated) DEVICE — SPONGE LAP 18X18 PREWASHED

## (undated) DEVICE — DRAIN CHANNEL ROUND 15FR

## (undated) DEVICE — URINARY DRAINAGE BAG

## (undated) DEVICE — SET IRR URLGY 2LINE UNIV SPIKE

## (undated) DEVICE — GAUZE SPONGE 4X4 12PLY

## (undated) DEVICE — DRAPE C-ARM ELAS CLIP 42X120IN

## (undated) DEVICE — UNDERGLOVES BIOGEL PI SIZE 7.5

## (undated) DEVICE — WIRE ANGLED TIP SENSOR

## (undated) DEVICE — LOOP VESSEL YELLOW MAXI

## (undated) DEVICE — SEE MEDLINE ITEM 146347

## (undated) DEVICE — CLIP LIGACLIP XTRA TITANIUM

## (undated) DEVICE — DRAPE NEPHRSCPY SURG 72X118IN

## (undated) DEVICE — SUT 1 48IN PDS II VIO MONO

## (undated) DEVICE — SPONGE COTTON TRAY 4X4IN

## (undated) DEVICE — BLADE 4 INCH EDGE UN-INS

## (undated) DEVICE — SUT 0 54IN COATED VICRYL U

## (undated) DEVICE — DRESSING TRANS 4X4 TEGADERM

## (undated) DEVICE — SUT CTD VICRYL SUTUPAK

## (undated) DEVICE — APPLICATOR CHLORAPREP ORN 26ML

## (undated) DEVICE — CONTAINER SPECIMEN STRL 4OZ

## (undated) DEVICE — CATH URETERAL DL 10FR 24CM

## (undated) DEVICE — SPONGE GAUZE 16PLY 4X4

## (undated) DEVICE — COVER LIGHT HANDLE 80/CA

## (undated) DEVICE — TRAY FOLEY 16FR INFECTION CONT

## (undated) DEVICE — GOWN POLY REINF BRTH SLV XL

## (undated) DEVICE — SEE L#95700

## (undated) DEVICE — PACK CYSTO

## (undated) DEVICE — CUP MEDICINE STERILE 2OZ

## (undated) DEVICE — DRESSING ABSRBNT ISLAND 3.6X8

## (undated) DEVICE — JELLY LUBRICANT STERILE 4 OZ

## (undated) DEVICE — CLIPPER BLADE MOD 4406 (CAREF)

## (undated) DEVICE — SYR 10CC LUER LOCK

## (undated) DEVICE — SUT SILK 3-0 SH 18IN BLACK

## (undated) DEVICE — GUIDEWIRE PTFE .038INX145CM

## (undated) DEVICE — GUIDE WIRE MOTION .035 X 150CM

## (undated) DEVICE — SUT VICRYL PLUS 3-0 SH 18IN

## (undated) DEVICE — SEE MEDLINE ITEM 146417

## (undated) DEVICE — URETEROSCOPE LITHOVUE STANDARD

## (undated) DEVICE — CANISTER OMNI-JUG SUCTION 16LT

## (undated) DEVICE — EVACUATOR WOUND BULB 100CC

## (undated) DEVICE — ELECTRODE REM PLYHSV RETURN 9

## (undated) DEVICE — BLADE SURG #15 CARBON STEEL

## (undated) DEVICE — SUT 2/0 30IN SILK BLK BRAI

## (undated) DEVICE — CUTTER PROXIMATE BLUE 75MM

## (undated) DEVICE — DRAPE HALF SURGICAL 40X58IN

## (undated) DEVICE — SUT CTD VICRYL VIL BR CR/SH

## (undated) DEVICE — ADHESIVE DERMABOND ADVANCED

## (undated) DEVICE — SOL IRR WATER STRL 3000 ML

## (undated) DEVICE — TAPE SURG MEDIPORE 6X72IN

## (undated) DEVICE — SET EXTENSION STERILE 30IN

## (undated) DEVICE — TRAY CYSTO BASIN OMC

## (undated) DEVICE — CATH URETERAL DUAL LUMEN 10FR

## (undated) DEVICE — ELECTRODE EXTENDED BLADE

## (undated) DEVICE — SUT MCRYL PLUS 4-0 PS2 27IN

## (undated) DEVICE — EXTRACTOR TIPLESS 2.4FRX1115CM

## (undated) DEVICE — ADHESIVE MASTISOL VIAL 48/BX

## (undated) DEVICE — DRESSING TRANS 8X12 TEGADERM

## (undated) DEVICE — KIT LITHOCLAST TRILOGY 3.9X440

## (undated) DEVICE — KIT UROSTOMY

## (undated) DEVICE — PENCIL ROCKER SWITCH 10FT CORD

## (undated) DEVICE — SEE MEDLINE ITEM 157144

## (undated) DEVICE — SEALER LIGASURE IMPACT 18CM

## (undated) DEVICE — GOWN POLY REINF BRTH SLV LG

## (undated) DEVICE — SUT ETHILON 2-0 PSLX 30IN

## (undated) DEVICE — COVER TABLE 77 X 96

## (undated) DEVICE — PACK ECLIPSE SET-UP W/O DRAPE

## (undated) DEVICE — DRAPE STERI INSTRUMENT 1018

## (undated) DEVICE — GOWN SURGICAL X-LARGE

## (undated) DEVICE — CART STAPLE RELD PROXTX 60X3.5

## (undated) DEVICE — SHEATH FLEXOR URET 10.7FRX35CM

## (undated) DEVICE — TRAY SKIN SCRUB WET PREMIUM

## (undated) DEVICE — SUT PDS II O CTX MONO 60

## (undated) DEVICE — DRAPE THREE-QTR REINF 53X77IN

## (undated) DEVICE — SUT CTD VICRYL VIL BR UR-5

## (undated) DEVICE — SPONGE IV DRAIN 4X4 STERILE

## (undated) DEVICE — SUT SILK 2-0 STRANDS 30IN

## (undated) DEVICE — GAUZE SPONGE PEANUT STRL

## (undated) DEVICE — BAG DRAIN ANTI REFLUX 2000ML

## (undated) DEVICE — SUT SILK 0 STRANDS 30IN BLK

## (undated) DEVICE — DEVICE ENSEAL X1 LARGE JAW

## (undated) DEVICE — SOL IRR NACL .9% 3000ML

## (undated) DEVICE — TOWEL OR DISP STRL BLUE 4/PK

## (undated) DEVICE — NDL 18GA X1 1/2 REG BEVEL

## (undated) DEVICE — SOL WATER STRL IRR 1000ML

## (undated) DEVICE — DEVICE STABILIZATION STAT LOCK

## (undated) DEVICE — GLOVE SURG BIOGEL LATEX SZ 7.5

## (undated) DEVICE — NDL BOX COUNTER

## (undated) DEVICE — STAPLER INT PROX TX 60X3.5MM

## (undated) DEVICE — SEE MEDLINE ITEM 157117

## (undated) DEVICE — SUT 3/0 27IN PDS II VIO MO

## (undated) DEVICE — BAG PRESSURE INFUSER 3000CC

## (undated) DEVICE — SUT ETHILON 2-0 BLK PS-2

## (undated) DEVICE — SUT SILK 3-0 STRANDS 30IN

## (undated) DEVICE — SUT SILK 2-0 SH 18IN BLACK

## (undated) DEVICE — TIP YANKAUERS BULB NO VENT

## (undated) DEVICE — SEE MEDLINE ITEM 156902

## (undated) DEVICE — BLADE SURG CARBON STEEL SZ11

## (undated) DEVICE — LUBRICANT SURGILUBE 2 OZ

## (undated) DEVICE — TOWEL OR XRAY WHITE 17X26IN

## (undated) DEVICE — SET DECANTER MEDICHOICE

## (undated) DEVICE — SUT MONOCRYL 4-0 UND RB-1

## (undated) DEVICE — GOWN NONREINF SET-IN SLV 2XL

## (undated) DEVICE — SUT PROLENE 0-36 V-7

## (undated) DEVICE — NDL MAYO CAT 1/2 CIR #6

## (undated) DEVICE — SUT CTD VICRYL 0 VIL BR/CT

## (undated) DEVICE — SUT 4-0 MONO PLUS RB-1

## (undated) DEVICE — SUT 3-0 VICRYL SH CR/8 18

## (undated) DEVICE — SUT CHROMIC 3-0 SH 27IN GUT

## (undated) DEVICE — SYR ONLY LUER LOCK 20CC

## (undated) DEVICE — TRAY MINOR GEN SURG OMC

## (undated) DEVICE — STAPLER SKIN PROXIMATE WIDE

## (undated) DEVICE — ADAPTER HOSE 10FT 8MM

## (undated) DEVICE — LOOP VESSEL BLUE MAXI

## (undated) DEVICE — DRESSING ADH ISLAND 3.6 X 14

## (undated) DEVICE — CATH URETHRAL 16FR RED

## (undated) DEVICE — WIRE GUIDE TFLN CT SPR STFF ST

## (undated) DEVICE — SUT PDS II 2-0 CT-2 VIL

## (undated) DEVICE — SEE MEDLINE ITEM 152622

## (undated) DEVICE — KIT COLLECTION E SWAB MINI

## (undated) DEVICE — SUT MONOCRYL 3-0 RB1

## (undated) DEVICE — SEE MEDLINE ITEM 157149

## (undated) DEVICE — SUT MONOCRYL 2-0 SH

## (undated) DEVICE — GUIDEWIRE STR TIP HIWIRE 150CM

## (undated) DEVICE — DRESSING MEPORE PRO 6 X 7 CM

## (undated) DEVICE — NDL 22GA X1 1/2 REG BEVEL

## (undated) DEVICE — PACK LAPSCP/PELVSCPY III TIBRN